# Patient Record
Sex: FEMALE | Race: BLACK OR AFRICAN AMERICAN | NOT HISPANIC OR LATINO | Employment: OTHER | URBAN - METROPOLITAN AREA
[De-identification: names, ages, dates, MRNs, and addresses within clinical notes are randomized per-mention and may not be internally consistent; named-entity substitution may affect disease eponyms.]

---

## 2017-01-23 ENCOUNTER — OFFICE VISIT (OUTPATIENT)
Dept: ORTHOPEDICS | Facility: CLINIC | Age: 82
End: 2017-01-23
Payer: MEDICARE

## 2017-01-23 VITALS
HEIGHT: 65 IN | DIASTOLIC BLOOD PRESSURE: 69 MMHG | SYSTOLIC BLOOD PRESSURE: 115 MMHG | WEIGHT: 162.38 LBS | HEART RATE: 88 BPM | BODY MASS INDEX: 27.05 KG/M2

## 2017-01-23 DIAGNOSIS — G89.29 CHRONIC MIDLINE LOW BACK PAIN WITH RIGHT-SIDED SCIATICA: ICD-10-CM

## 2017-01-23 DIAGNOSIS — M54.41 CHRONIC MIDLINE LOW BACK PAIN WITH RIGHT-SIDED SCIATICA: ICD-10-CM

## 2017-01-23 DIAGNOSIS — M79.671 RIGHT FOOT PAIN: Primary | ICD-10-CM

## 2017-01-23 PROCEDURE — 99213 OFFICE O/P EST LOW 20 MIN: CPT | Mod: 25,S$PBB,, | Performed by: ORTHOPAEDIC SURGERY

## 2017-01-23 PROCEDURE — 99213 OFFICE O/P EST LOW 20 MIN: CPT | Mod: PBBFAC | Performed by: ORTHOPAEDIC SURGERY

## 2017-01-23 PROCEDURE — 99999 PR PBB SHADOW E&M-EST. PATIENT-LVL III: CPT | Mod: PBBFAC,,, | Performed by: ORTHOPAEDIC SURGERY

## 2017-01-23 RX ORDER — HYDROCODONE BITARTRATE AND ACETAMINOPHEN 5; 325 MG/1; MG/1
TABLET ORAL
COMMUNITY
Start: 2016-12-30 | End: 2017-04-07 | Stop reason: SDUPTHER

## 2017-01-23 RX ORDER — CELECOXIB 200 MG/1
CAPSULE ORAL
COMMUNITY
Start: 2017-01-12 | End: 2017-02-02

## 2017-01-23 RX ORDER — PENICILLIN V POTASSIUM 500 MG/1
TABLET, FILM COATED ORAL
COMMUNITY
Start: 2016-12-01 | End: 2017-02-23

## 2017-01-23 RX ORDER — PREDNISONE 5 MG/1
10 TABLET ORAL DAILY
Refills: 1 | COMMUNITY
Start: 2017-01-02 | End: 2017-02-23 | Stop reason: SDUPTHER

## 2017-01-23 NOTE — MR AVS SNAPSHOT
[facility]  [address]  [place] [address]  Phone: [phone]                  Selma Lux   2017 11:00 AM   Office Visit    Description:  Female : 1935   Provider:  Prudencio Quinones MD   Department:  [facility]                To Do List           Future Appointments        Provider Department Dept Phone    2017 1:00 PM LAB, HEMONC CANCER BLDG [facility] [phone]    2017 2:00 PM MD [facility] [phone]    2017 1:20 PM Bhargav Lee MD [facility] [phone]    2017 10:40 AM Chu Qureshi MD [facility] Rheumatology [phone]    2017 11:15 AM Carlos Skelton MD [facility] [phone]      Goals (5 Years of Data)     None      [facility]     [facility] Nurse Care Line -  Assistance  Registered nurses in the [facility] provide clinical advisement, health education, appointment booking, and other advisory services.  Call for this free service at [phone].             Medications           Message regarding Medications     Verify the changes and/or additions to your medication regime listed below are the same as discussed with your clinician today.  If any of these changes or additions are incorrect, please notify your healthcare provider.             Verify that the below list of medications is an accurate representation of the medications you are currently taking.  If none reported, the list may be blank. If incorrect, please contact your healthcare provider. Carry this list with you in case of emergency.           Current Medications     amlodipine (NORVASC) 5 MG tablet Take 1 tablet (5 mg total) by mouth once daily.    celecoxib (CELEBREX) 200 MG capsule     esomeprazole (NEXIUM) 40 MG capsule Take 1 capsule (40 mg total) by mouth once daily. 1 Capsule, Delayed Release(E.C.) Oral Every day  "   hydrocodone-acetaminophen 5-325mg (NORCO) 5-325 mg per tablet     hydroxychloroquine (PLAQUENIL) 200 mg tablet TAKE 2 TABLETS ONCE DAILY    montelukast (SINGULAIR) 10 mg tablet Take 1 tablet (10 mg total) by mouth every evening.    predniSONE (DELTASONE) 5 MG tablet Take 5 mg by mouth once daily.    cyclobenzaprine (FLEXERIL) 10 MG tablet 10 mg as needed.     etodolac (LODINE) 400 MG tablet     ferrous sulfate 325 (65 FE) MG EC tablet Take 325 mg by mouth 2 (two) times daily.     hydrochlorothiazide (HYDRODIURIL) 12.5 MG Tab Take 1 tablet (12.5 mg total) by mouth once daily.    hydrocodone-acetaminophen 7.5-325mg (NORCO) 7.5-325 mg per tablet Take 1 tablet by mouth 2 (two) times daily as needed for Pain.    meloxicam (MOBIC) 15 MG tablet TAKE 1 TABLET DAILY    penicillin v potassium (VEETID) 500 MG tablet            Clinical Reference Information           Vital Signs - Last Recorded  Most recent update: 1/23/2017 11:11 AM by Madai Rouse MA    BP Pulse Ht Wt BMI    115/69 (BP Location: Right arm, Patient Position: Sitting, BP Method: Automatic) 88 5' 5" (1.651 m) 73.6 kg (162 lb 5.9 oz) 27.02 kg/m2      Blood Pressure          Most Recent Value    BP  115/69      Allergies as of 1/23/2017     No Known Allergies      Immunizations Administered on Date of Encounter - 1/23/2017     None      "

## 2017-01-23 NOTE — PROGRESS NOTES
CC:   left hip pain  HPI:  81 y.o. yo female who presents with left hip pain.    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     She returns for follow-up of the left hip pain.  She states the left hip has nearly totally resolved.  She did she's been doing very well.  She has not done physical therapy however.  She would likely the hip alone for now.    She does present with 2 separate problems today.  The first is left and right foot pain.  The right foot is hurting worse than left.  She has a history of rheumatologic disorder and is to see a rheumatologist in the near future.  She also would like to be evaluated for her foot pain.  She states the foot is been hurting for many months.  She states that is sharp and stabbing.  It keeps her from walking doing her activities of daily living.  She is not had any recent treatment for the foot pain.    She also complains of back pain.  She has some radiating numbness down the right leg like to have this evaluated as well.  She states that she's had no injury.  The pain is sharp and stabbing in the back and then a numbness down the leg.    PAST MEDICAL HISTORY:   Past Medical History   Diagnosis Date    Anemia     Arthritis     Hashimoto's disease     Hypertension     IGT (impaired glucose tolerance) 1/12/2016    Joint pain     Multinodular goiter 1/12/2016     PAST SURGICAL HISTORY:   Past Surgical History   Procedure Laterality Date    Salivary gland surgery      Tonsillectomy      Left parotidectomy       mixed tumor    Cataract extraction      Colonoscopy N/A 9/29/2015     Procedure: COLONOSCOPY;  Surgeon: FIDELINA Sanchez MD;  Location: [address];  Service: Endoscopy;  Laterality: N/A;    Joint replacement       right knee    Knee surgery Left 12/31/2013     TKR     FAMILY HISTORY:   Family History   Problem Relation Age of Onset    Hypertension Mother     Prostate cancer Father      prostate  cancer    Breast cancer Maternal Grandmother     Lupus Neg Hx     Psoriasis Neg Hx     Melanoma Neg Hx     Colon cancer Neg Hx      SOCIAL HISTORY:   Social History     Social History    Marital status:      Spouse name: N/A    Number of children: N/A    Years of education: N/A     Occupational History    Not on file.     Social History Main Topics    Smoking status: Never Smoker    Smokeless tobacco: Never Used    Alcohol use No    Drug use: No    Sexual activity: No      Comment: ,  age 52,      Other Topics Concern    Not on file     Social History Narrative       MEDICATIONS:   Current Outpatient Prescriptions:     amlodipine (NORVASC) 5 MG tablet, Take 1 tablet (5 mg total) by mouth once daily., Disp: 90 tablet, Rfl: 3    celecoxib (CELEBREX) 200 MG capsule, , Disp: , Rfl:     esomeprazole (NEXIUM) 40 MG capsule, Take 1 capsule (40 mg total) by mouth once daily. 1 Capsule, Delayed Release(E.C.) Oral Every day, Disp: 90 capsule, Rfl: 3    hydrocodone-acetaminophen 5-325mg (NORCO) 5-325 mg per tablet, , Disp: , Rfl:     hydroxychloroquine (PLAQUENIL) 200 mg tablet, TAKE 2 TABLETS ONCE DAILY, Disp: 180 tablet, Rfl: 1    montelukast (SINGULAIR) 10 mg tablet, Take 1 tablet (10 mg total) by mouth every evening., Disp: 90 tablet, Rfl: 3    predniSONE (DELTASONE) 5 MG tablet, Take 5 mg by mouth once daily., Disp: , Rfl: 1    cyclobenzaprine (FLEXERIL) 10 MG tablet, 10 mg as needed. , Disp: , Rfl:     etodolac (LODINE) 400 MG tablet, , Disp: , Rfl:     ferrous sulfate 325 (65 FE) MG EC tablet, Take 325 mg by mouth 2 (two) times daily. , Disp: , Rfl:     hydrochlorothiazide (HYDRODIURIL) 12.5 MG Tab, Take 1 tablet (12.5 mg total) by mouth once daily., Disp: 90 tablet, Rfl: 3    hydrocodone-acetaminophen 7.5-325mg (NORCO) 7.5-325 mg per tablet, Take 1 tablet by mouth 2 (two) times daily as needed for Pain., Disp: 15 tablet, Rfl: 0    meloxicam (MOBIC) 15 MG tablet, TAKE 1  "TABLET DAILY, Disp: 90 tablet, Rfl: 0    penicillin v potassium (VEETID) 500 MG tablet, , Disp: , Rfl:   ALLERGIES: Review of patient's allergies indicates:  No Known Allergies    VITAL SIGNS:   Visit Vitals    /69 (BP Location: Right arm, Patient Position: Sitting, BP Method: Automatic)    Pulse 88    Ht 5' 5" (1.651 m)    Wt 73.6 kg (162 lb 5.9 oz)    BMI 27.02 kg/m2        Review of Systems   Constitution: Negative. Negative for chills, fever and night sweats.   HENT: Negative for congestion and headaches.    Eyes: Negative for blurred vision, left vision loss and right vision loss.   Cardiovascular: Negative for chest pain and syncope.   Respiratory: Negative for cough and shortness of breath.    Endocrine: Negative for polydipsia, polyphagia and polyuria.   Hematologic/Lymphatic: Negative for bleeding problem. Does not bruise/bleed easily.   Skin: Negative for dry skin, itching and rash.   Musculoskeletal: Negative for falls and muscle weakness.  left hip pain  Gastrointestinal: Negative for abdominal pain and bowel incontinence.   Genitourinary: Negative for bladder incontinence and nocturia.   Neurological: Negative for disturbances in coordination, loss of balance and seizures.   Psychiatric/Behavioral: Negative for depression. The patient does not have insomnia.    Allergic/Immunologic: Negative for hives and persistent infections.       Physical Exam   Constitutional: Oriented to person, place, and time. Appears well-developed and well-nourished.   HENT:   Head: Normocephalic and atraumatic.   Nose: Nose normal.   Eyes: No scleral icterus.   Neck: Normal range of motion. Neck supple.   Cardiovascular: Normal rate and regular rhythm.    Pulses:       Radial pulses are 2+ on the right side, and 2+ on the left side.   Pulmonary/Chest: Clear and normal  Abdominal: Soft.   Neurological: Alert and oriented to person, place, and time.   Skin: Skin is warm.   Psychiatric: Normal mood and affect.     On " physical exam she is in no acute distress.  On her right lower extremity she has full range motion of the hip without pain.  Her right knee has full range of motion 0-110° with no instability.  She is neurovascular intact the right lower extremity.     Her left lower extremity she has extreme tenderness palpation at the greater trochanter.  She has normal full range of motion of the left hip without pain in the groin.  On examination of strength of her abductors her right side is 5 out of 5 and on the left side is 4+ out of 5.  On examination of her right knee she has full range of motion from 0-110° with no instability whatsoever.  She is no rest intact in the lateral lower extremities    Bilateral feet show mild tenderness palpation throughout.    She has a negative straight leg raise.      Xrays:  Of the bilateral hips with AP pelvis are  reviewed and interpretation as follows: mild to moderate osteoarthritis bilateral hips.  She has no osseous deformity of the greater trochanters or lesser trochanters.    X-rays of the spine are reviewed and demonstrate severe spondylosis of the lumbar spine.  She has degenerative scoliosis as well.    Assessment:  Encounter Diagnoses   Name Primary?    Right foot pain Yes    Chronic midline low back pain with right-sided sciatica        Plan:         Return if symptoms worsen or fail to improve.      I will leave her hips alone for now.  I will set her up with appointments with both Dr. lerma and Dr. Nita Slaughter.  She'll follow with me when necessary.

## 2017-01-26 ENCOUNTER — TELEPHONE (OUTPATIENT)
Dept: RHEUMATOLOGY | Facility: CLINIC | Age: 82
End: 2017-01-26

## 2017-01-26 NOTE — TELEPHONE ENCOUNTER
----- Message from Tad Zaidi sent at 1/26/2017  9:19 AM CST -----  Contact: self@ [phone]  Concerning her health, no other information given

## 2017-01-27 ENCOUNTER — TELEPHONE (OUTPATIENT)
Dept: RHEUMATOLOGY | Facility: CLINIC | Age: 82
End: 2017-01-27

## 2017-01-27 NOTE — TELEPHONE ENCOUNTER
She has been flaring for the past several months, especially her hands and feet.  Her anti-inflammatory medicines was changed to celecoxib.  She seems to be willing to go on another DMARD at this time.  She is scheduled to see me in 2 weeks.  I told her to increase her prednisone to 10 mg daily until that visit.

## 2017-01-27 NOTE — TELEPHONE ENCOUNTER
----- Message from Kalani Castillo MA sent at 1/26/2017  9:31 AM CST -----  Contact: self@ [phone]  Pt says she has been feeling bad, she would like a call back to discuss maybe increasing or changing her medications.  ----- Message -----     From: Tad Zaidi     Sent: 1/26/2017   9:19 AM       To: [facility] Staff    Concerning her health, no other information given

## 2017-02-02 ENCOUNTER — OFFICE VISIT (OUTPATIENT)
Dept: HEMATOLOGY/ONCOLOGY | Facility: CLINIC | Age: 82
End: 2017-02-02
Payer: MEDICARE

## 2017-02-02 ENCOUNTER — LAB VISIT (OUTPATIENT)
Dept: LAB | Facility: HOSPITAL | Age: 82
End: 2017-02-02
Attending: INTERNAL MEDICINE
Payer: MEDICARE

## 2017-02-02 VITALS
HEART RATE: 79 BPM | DIASTOLIC BLOOD PRESSURE: 67 MMHG | TEMPERATURE: 98 F | WEIGHT: 165.56 LBS | HEIGHT: 65 IN | SYSTOLIC BLOOD PRESSURE: 143 MMHG | BODY MASS INDEX: 27.58 KG/M2

## 2017-02-02 DIAGNOSIS — D50.0 IRON DEFICIENCY ANEMIA DUE TO CHRONIC BLOOD LOSS: Primary | ICD-10-CM

## 2017-02-02 DIAGNOSIS — D50.9 IRON DEFICIENCY ANEMIA: ICD-10-CM

## 2017-02-02 LAB
BASOPHILS # BLD AUTO: 0.01 K/UL
BASOPHILS NFR BLD: 0.1 %
DIFFERENTIAL METHOD: ABNORMAL
EOSINOPHIL # BLD AUTO: 0.2 K/UL
EOSINOPHIL NFR BLD: 1.8 %
ERYTHROCYTE [DISTWIDTH] IN BLOOD BY AUTOMATED COUNT: 14.8 %
FERRITIN SERPL-MCNC: 691 NG/ML
HCT VFR BLD AUTO: 33.9 %
HGB BLD-MCNC: 10.4 G/DL
LYMPHOCYTES # BLD AUTO: 1.3 K/UL
LYMPHOCYTES NFR BLD: 12.8 %
MCH RBC QN AUTO: 25.2 PG
MCHC RBC AUTO-ENTMCNC: 30.7 %
MCV RBC AUTO: 82 FL
MONOCYTES # BLD AUTO: 1.3 K/UL
MONOCYTES NFR BLD: 13.4 %
NEUTROPHILS # BLD AUTO: 7.1 K/UL
NEUTROPHILS NFR BLD: 71.8 %
PLATELET # BLD AUTO: 279 K/UL
PMV BLD AUTO: 9.3 FL
RBC # BLD AUTO: 4.13 M/UL
WBC # BLD AUTO: 9.87 K/UL

## 2017-02-02 PROCEDURE — 99214 OFFICE O/P EST MOD 30 MIN: CPT | Mod: S$PBB,,, | Performed by: INTERNAL MEDICINE

## 2017-02-02 PROCEDURE — 99999 PR PBB SHADOW E&M-EST. PATIENT-LVL III: CPT | Mod: PBBFAC,,, | Performed by: INTERNAL MEDICINE

## 2017-02-02 PROCEDURE — 36415 COLL VENOUS BLD VENIPUNCTURE: CPT

## 2017-02-02 PROCEDURE — 85025 COMPLETE CBC W/AUTO DIFF WBC: CPT

## 2017-02-02 PROCEDURE — 99213 OFFICE O/P EST LOW 20 MIN: CPT | Mod: PBBFAC | Performed by: INTERNAL MEDICINE

## 2017-02-02 PROCEDURE — 82728 ASSAY OF FERRITIN: CPT

## 2017-02-02 RX ORDER — MELOXICAM 15 MG/1
TABLET ORAL
Qty: 90 TABLET | Refills: 0 | Status: SHIPPED | OUTPATIENT
Start: 2017-02-02 | End: 2017-02-23

## 2017-02-02 NOTE — PROGRESS NOTES
Subjective:       Patient ID: Selma Lux is a 81 y.o. female.    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 Upper and lower endoscopy has been negative for GI blood loss. Urinalysis is negative for hematuria. There is no clinical blood loss.   Dietary heme iron is adequate. She takes interval iron supplements. She remains anemic and iron deficient.     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 CBC has a mild anemia, but ferritin remains normal.    HPI  Review of Systems   Constitutional: Negative.    HENT: Negative.    Eyes: Negative.    Respiratory: Negative.    Cardiovascular: Positive for leg swelling.   Gastrointestinal: Negative for blood in stool.   Endocrine: Negative.    Genitourinary: Negative for hematuria.   Musculoskeletal: Positive for arthralgias.   Skin: Negative.    Allergic/Immunologic: Negative for environmental allergies, food allergies and immunocompromised state.   Neurological: Negative.    Hematological: Negative for adenopathy. Does not bruise/bleed easily.   Psychiatric/Behavioral: Negative.        Objective:      Physical Exam   Constitutional: She is oriented to person, place, and time. She appears well-developed and well-nourished.   HENT:   Head: Normocephalic and atraumatic.   Eyes: Conjunctivae are normal.   Neck: Normal range of motion. Neck supple.   Cardiovascular: Normal rate and intact distal pulses.    Pulmonary/Chest: Effort normal. No respiratory distress.   Abdominal: She exhibits no distension. There is no tenderness.   Neurological: She is alert and oriented to person, place, and time.   Skin: Skin is warm and dry.   Psychiatric: She has a normal mood and affect. Her behavior is normal. Judgment and thought content normal.   Nursing note and vitals reviewed.      Assessment:       1. Iron deficiency anemia due to chronic blood loss        Plan:       Repeat  CBC and ferritin in 6 months  Continue oral iron  Continue dietary heme iron

## 2017-02-14 ENCOUNTER — OFFICE VISIT (OUTPATIENT)
Dept: INTERNAL MEDICINE | Facility: CLINIC | Age: 82
End: 2017-02-14
Payer: MEDICARE

## 2017-02-14 VITALS
WEIGHT: 160.5 LBS | SYSTOLIC BLOOD PRESSURE: 118 MMHG | OXYGEN SATURATION: 96 % | HEART RATE: 83 BPM | HEIGHT: 65 IN | DIASTOLIC BLOOD PRESSURE: 66 MMHG | BODY MASS INDEX: 26.74 KG/M2

## 2017-02-14 DIAGNOSIS — E06.3 HASHIMOTO'S DISEASE: ICD-10-CM

## 2017-02-14 DIAGNOSIS — R20.0 NUMBNESS: Primary | ICD-10-CM

## 2017-02-14 DIAGNOSIS — M16.0 OSTEOARTHRITIS OF BOTH HIPS, UNSPECIFIED OSTEOARTHRITIS TYPE: ICD-10-CM

## 2017-02-14 DIAGNOSIS — M15.9 PRIMARY OSTEOARTHRITIS INVOLVING MULTIPLE JOINTS: ICD-10-CM

## 2017-02-14 DIAGNOSIS — I10 HYPERTENSION, ESSENTIAL: ICD-10-CM

## 2017-02-14 DIAGNOSIS — M05.79 SEROPOSITIVE RHEUMATOID ARTHRITIS OF MULTIPLE SITES: ICD-10-CM

## 2017-02-14 DIAGNOSIS — E78.5 HYPERLIPIDEMIA, UNSPECIFIED HYPERLIPIDEMIA TYPE: ICD-10-CM

## 2017-02-14 DIAGNOSIS — M75.120 COMPLETE TEAR OF ROTATOR CUFF, UNSPECIFIED LATERALITY: ICD-10-CM

## 2017-02-14 PROCEDURE — 99214 OFFICE O/P EST MOD 30 MIN: CPT | Mod: S$PBB,,, | Performed by: FAMILY MEDICINE

## 2017-02-14 PROCEDURE — 99213 OFFICE O/P EST LOW 20 MIN: CPT | Mod: PBBFAC | Performed by: FAMILY MEDICINE

## 2017-02-14 PROCEDURE — 99999 PR PBB SHADOW E&M-EST. PATIENT-LVL III: CPT | Mod: PBBFAC,,, | Performed by: FAMILY MEDICINE

## 2017-02-14 RX ORDER — CELECOXIB 200 MG/1
200 CAPSULE ORAL 2 TIMES DAILY
COMMUNITY
End: 2017-03-14 | Stop reason: SDUPTHER

## 2017-02-14 NOTE — MR AVS SNAPSHOT
[facility]  [address]  [facility] [address]  Phone: [phone]  Fax: [phone]                  Selma Lux   2017 1:20 PM   Office Visit    Description:  Female : 1935   Provider:  Bhargav Lee MD   Department:  [facility] - [facility] Medicine           Reason for Visit     Follow-up           Diagnoses this Visit        Comments    Numbness    -  Primary     Primary osteoarthritis involving multiple joints         Hypertension, essential         Osteoarthritis of both hips, unspecified osteoarthritis type         Complete tear of rotator cuff, unspecified laterality         Seropositive rheumatoid arthritis of multiple sites         Hyperlipidemia, unspecified hyperlipidemia type         Hashimoto's disease                To Do List           Future Appointments        Provider Department Dept Phone    2017 10:40 AM Chu Qureshi MD [facility] [phone]    2017 11:15 AM Carlos Skelton MD [facility] [phone]    2017 1:15 PM Carlos Skelton MD [facility] [phone]    2017 1:00 PM Raciel Landa MD [facility] [phone]      Goals (5 Years of Data)     None      Follow-Up and Disposition     Return in about 3 months (around 2017) for lab review (after future labs), Keep appointments with specialty providers..    Follow-up and Disposition History      [facility]     [facility] Care [facility] -  Assistance  Registered nurses in the [facility] provide clinical advisement, health education, appointment booking, and other advisory services.  Call for this free service at [phone].             Medications           Message regarding Medications     Verify the changes and/or additions to your medication regime listed below are the same as discussed with your clinician today.  If any of these changes or additions are  "incorrect, please notify your healthcare provider.             Verify that the below list of medications is an accurate representation of the medications you are currently taking.  If none reported, the list may be blank. If incorrect, please contact your healthcare provider. Carry this list with you in case of emergency.           Current Medications     amlodipine (NORVASC) 5 MG tablet Take 1 tablet (5 mg total) by mouth once daily.    celecoxib (CELEBREX) 200 MG capsule Take 200 mg by mouth 2 (two) times daily.    esomeprazole (NEXIUM) 40 MG capsule Take 1 capsule (40 mg total) by mouth once daily. 1 Capsule, Delayed Release(E.C.) Oral Every day    ferrous sulfate 325 (65 FE) MG EC tablet Take 325 mg by mouth 2 (two) times daily.     hydrocodone-acetaminophen 7.5-325mg (NORCO) 7.5-325 mg per tablet Take 1 tablet by mouth 2 (two) times daily as needed for Pain.    hydroxychloroquine (PLAQUENIL) 200 mg tablet TAKE 2 TABLETS ONCE DAILY    montelukast (SINGULAIR) 10 mg tablet Take 1 tablet (10 mg total) by mouth every evening.    predniSONE (DELTASONE) 5 MG tablet Take 10 mg by mouth once daily.     cyclobenzaprine (FLEXERIL) 10 MG tablet 10 mg as needed.     hydrochlorothiazide (HYDRODIURIL) 12.5 MG Tab Take 1 tablet (12.5 mg total) by mouth once daily.    hydrocodone-acetaminophen 5-325mg (NORCO) 5-325 mg per tablet     meloxicam (MOBIC) 15 MG tablet TAKE 1 TABLET DAILY    penicillin v potassium (VEETID) 500 MG tablet            Clinical Reference Information           Your Vitals Were     BP Pulse Height Weight SpO2 BMI    118/66 (BP Location: Left arm, Patient Position: Sitting, BP Method: Manual) 83 5' 5" (1.651 m) 72.8 kg (160 lb 7.9 oz) 96% 26.71 kg/m2      Blood Pressure          Most Recent Value    BP  118/66      Allergies as of 2/14/2017     No Known Allergies      Immunizations Administered on Date of Encounter - 2/14/2017     None      Orders Placed During Today's Visit     Future Labs/Procedures " Expected by Expires    Comprehensive metabolic panel  5/15/2017 (Approximate) 8/13/2017    Lipid panel  5/15/2017 (Approximate) 8/13/2017    TSH  5/15/2017 (Approximate) 8/13/2017    EMG w/ Ultrasound and Nerve Conduction  As directed 2/14/2018      Language Assistance Services     ATTENTION: Language assistance services are available, free of charge. Please call [phone].      ATENCIÓN: Si habla español, tiene a lucio disposición servicios gratuitos de asistencia lingüística. Llame al [phone].     CHÚ Ý: N?u b?n nói Ti?ng Vi?t, có các d?ch v? h? tr? ngôn ng? mi?n phí dành cho b?n. G?i s? [phone].         [facility] complies with applicable Federal civil rights laws and does not discriminate on the basis of race, color, national origin, age, disability, or sex.

## 2017-02-14 NOTE — PROGRESS NOTES
Subjective:       Patient ID: Selma Lux is a 81 y.o. female.    Chief Complaint: Follow-up    HPI  Review of Systems   Constitutional: Positive for fatigue. Negative for chills and fever.   HENT: Negative for congestion and trouble swallowing.    Eyes: Negative for redness.   Respiratory: Negative for cough, chest tightness and shortness of breath.    Cardiovascular: Negative for chest pain, palpitations and leg swelling.   Gastrointestinal: Negative for abdominal pain and blood in stool.   Genitourinary: Negative for hematuria and menstrual problem.   Musculoskeletal: Positive for arthralgias, back pain, gait problem and neck pain. Negative for joint swelling and myalgias.   Skin: Negative for color change and rash.   Neurological: Negative for tremors, speech difficulty, weakness, numbness and headaches.   Hematological: Negative for adenopathy. Does not bruise/bleed easily.   Psychiatric/Behavioral: Negative for behavioral problems, confusion and sleep disturbance. The patient is not nervous/anxious.        Objective:      Physical Exam   Constitutional: She is oriented to person, place, and time. She appears well-developed and well-nourished. No distress.   Neck: Neck supple.   Pulmonary/Chest: Effort normal.   Abdominal: There is no tenderness. There is no rebound and no CVA tenderness.   Musculoskeletal: She exhibits no edema.        Right shoulder: She exhibits decreased range of motion and tenderness.        Left shoulder: She exhibits decreased range of motion and tenderness.        Right hip: She exhibits normal range of motion and normal strength.        Left hip: She exhibits normal range of motion and normal strength.        Thoracic back: She exhibits normal range of motion and no tenderness.        Lumbar back: She exhibits normal range of motion, no tenderness and no spasm.        Right lower leg: She exhibits no edema.        Left lower leg: She exhibits no edema.   Neurological: She is  alert and oriented to person, place, and time. She has normal strength. No sensory deficit. She displays a negative Romberg sign. Coordination and gait normal.   Reflex Scores:       Patellar reflexes are 2+ on the right side and 2+ on the left side.       Achilles reflexes are 2+ on the right side and 2+ on the left side.  Negative SLR.   Skin: Skin is warm and dry. No rash noted.   Psychiatric: She has a normal mood and affect. Her behavior is normal. Judgment and thought content normal.   Nursing note and vitals reviewed.      Assessment:       1. Numbness    2. Primary osteoarthritis involving multiple joints    3. Hypertension, essential    4. Osteoarthritis of both hips, unspecified osteoarthritis type    5. Complete tear of rotator cuff, unspecified laterality    6. Seropositive rheumatoid arthritis of multiple sites    7. Hyperlipidemia, unspecified hyperlipidemia type    8. Hashimoto's disease        Plan:   Selma was seen today for follow-up.    Diagnoses and all orders for this visit:    Numbness  -     EMG w/ Ultrasound and Nerve Conduction; Future    Primary osteoarthritis involving multiple joints    Hypertension, essential  -     Comprehensive metabolic panel; Future    Osteoarthritis of both hips, unspecified osteoarthritis type    Complete tear of rotator cuff, unspecified laterality    Seropositive rheumatoid arthritis of multiple sites    Hyperlipidemia, unspecified hyperlipidemia type  -     Lipid panel; Future    Hashimoto's disease  -     TSH; Future      See meds, orders, follow up, routing and instructions sections of encounter.  A 81-year-old follow up pain in the feet, difficulty with ambulation, some   fatigue, malaise.    We went over her problem list and medications.  I thinks some of her issues are   with her profound arthritis, especially with loss of range of motion in her   shoulders.  She does have some dysesthesias to the lower extremities, suggest   checking EMG nerve conduction  study.  She has had an MRI in the past.      SELENA/HN  dd: 02/14/2017 15:38:06 (CST)  td: 02/15/2017 02:08:37 (CST)  Doc ID   #[id number]  Job ID #[id number]    CC:

## 2017-02-20 ENCOUNTER — OFFICE VISIT (OUTPATIENT)
Dept: ORTHOPEDICS | Facility: CLINIC | Age: 82
End: 2017-02-20
Payer: MEDICARE

## 2017-02-20 ENCOUNTER — HOSPITAL ENCOUNTER (OUTPATIENT)
Dept: RADIOLOGY | Facility: HOSPITAL | Age: 82
Discharge: HOME | End: 2017-02-20
Attending: ORTHOPAEDIC SURGERY
Payer: MEDICARE

## 2017-02-20 VITALS
SYSTOLIC BLOOD PRESSURE: 131 MMHG | WEIGHT: 162.69 LBS | HEART RATE: 76 BPM | BODY MASS INDEX: 27.1 KG/M2 | DIASTOLIC BLOOD PRESSURE: 74 MMHG | HEIGHT: 65 IN

## 2017-02-20 DIAGNOSIS — M20.11 HALLUX VALGUS, RIGHT: ICD-10-CM

## 2017-02-20 DIAGNOSIS — R52 PAIN: ICD-10-CM

## 2017-02-20 DIAGNOSIS — R52 PAIN: Primary | ICD-10-CM

## 2017-02-20 PROCEDURE — 99999 PR PBB SHADOW E&M-EST. PATIENT-LVL III: CPT | Mod: PBBFAC,,, | Performed by: ORTHOPAEDIC SURGERY

## 2017-02-20 PROCEDURE — 73630 X-RAY EXAM OF FOOT: CPT | Mod: 26,RT,, | Performed by: RADIOLOGY

## 2017-02-20 PROCEDURE — 99213 OFFICE O/P EST LOW 20 MIN: CPT | Mod: S$PBB,,, | Performed by: ORTHOPAEDIC SURGERY

## 2017-02-20 PROCEDURE — 73630 X-RAY EXAM OF FOOT: CPT | Mod: TC,RT

## 2017-02-20 NOTE — PROGRESS NOTES
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 She has PMH of RA.  She has had flat feet her entire life with right > left, as well has bunions.  Both feet do cause pain, however recently her right foot has been much worse.  She has noticed increased pain and progression of her right bunion to the point that all shoe wear causes pain.  She has pain daily, but does feel that it has improved slightly.  She also endorses some mid-foot pain with constrictive shoes.  It has got to the point that she cannot wear slippers without pain.  She has not tried sandals.  She sued to wear high-heeled shoes daily for many years.  She has also noticed that she has begun walking on the medial border of her foot, possibly due to malalignment of her knees (she is s/p BL TKAs).      PAST MEDICAL/SURGICAL HISTORY:  Past Medical History   Diagnosis Date    Anemia     Arthritis     Hashimoto's disease     Hypertension     IGT (impaired glucose tolerance) 1/12/2016    Joint pain     Multinodular goiter 1/12/2016     Past Surgical History   Procedure Laterality Date    Salivary gland surgery      Tonsillectomy      Left parotidectomy       mixed tumor    Cataract extraction      Colonoscopy N/A 9/29/2015     Procedure: COLONOSCOPY;  Surgeon: FIDELINA Sanchez MD;  Location: [address];  Service: Endoscopy;  Laterality: N/A;    Joint replacement       right knee    Knee surgery Left 12/31/2013     TKR       Current Medications:   Current Outpatient Prescriptions:     amlodipine (NORVASC) 5 MG tablet, Take 1 tablet (5 mg total) by mouth once daily., Disp: 90 tablet, Rfl: 3    celecoxib (CELEBREX) 200 MG capsule, Take 200 mg by mouth 2 (two) times daily., Disp: , Rfl:     esomeprazole (NEXIUM) 40 MG capsule, Take 1 capsule (40 mg total) by mouth once daily. 1 Capsule,  Delayed Release(E.C.) Oral Every day, Disp: 90 capsule, Rfl: 3    hydrocodone-acetaminophen 5-325mg (NORCO) 5-325 mg per tablet, , Disp: , Rfl:     hydroxychloroquine (PLAQUENIL) 200 mg tablet, TAKE 2 TABLETS ONCE DAILY, Disp: 180 tablet, Rfl: 1    montelukast (SINGULAIR) 10 mg tablet, Take 1 tablet (10 mg total) by mouth every evening., Disp: 90 tablet, Rfl: 3    predniSONE (DELTASONE) 5 MG tablet, Take 10 mg by mouth once daily. , Disp: , Rfl: 1    cyclobenzaprine (FLEXERIL) 10 MG tablet, 10 mg as needed. , Disp: , Rfl:     ferrous sulfate 325 (65 FE) MG EC tablet, Take 325 mg by mouth 2 (two) times daily. , Disp: , Rfl:     hydrochlorothiazide (HYDRODIURIL) 12.5 MG Tab, Take 1 tablet (12.5 mg total) by mouth once daily., Disp: 90 tablet, Rfl: 3    hydrocodone-acetaminophen 7.5-325mg (NORCO) 7.5-325 mg per tablet, Take 1 tablet by mouth 2 (two) times daily as needed for Pain., Disp: 15 tablet, Rfl: 0    meloxicam (MOBIC) 15 MG tablet, TAKE 1 TABLET DAILY, Disp: 90 tablet, Rfl: 0    penicillin v potassium (VEETID) 500 MG tablet, , Disp: , Rfl:     Social History:   Social History     Social History    Marital status:      Spouse name: N/A    Number of children: N/A    Years of education: N/A     Occupational History    Not on file.     Social History Main Topics    Smoking status: Never Smoker    Smokeless tobacco: Never Used    Alcohol use No    Drug use: No    Sexual activity: No      Comment: ,  age 52,      Other Topics Concern    Not on file     Social History Narrative       REVIEW OF SYSTEMS:  Constitution: Negative. Negative for chills, fever and night sweats.   Cardiovascular: Negative for chest pain and syncope.   Respiratory: Negative for cough and shortness of breath.   Gastrointestinal: See HPI. Negative for nausea/vomiting. Negative for abdominal pain.  Genitourinary: See HPI. Negative for discoloration or dysuria.  Skin: Negative for dry skin, itching and rash.  "  Hematologic/Lymphatic: Negative for bleeding problem. Does not bruise/bleed easily.   Musculoskeletal: Negative for falls and muscle weakness.   Neurological: See HPI. No seizures.   Endocrine: Negative for polydipsia, polyphagia and polyuria.   Allergic/Immunologic: Negative for hives and persistent infections.    PHYSICAL EXAMINATION:    Visit Vitals    /74 (BP Location: Right arm, Patient Position: Sitting, BP Method: Automatic)    Pulse 76    Ht 5' 5" (1.651 m)    Wt 73.8 kg (162 lb 11.2 oz)    BMI 27.07 kg/m2       General: The patient is a 81 y.o. female in no apparent distress, the patient is orientatied to person, place and time.   Psych: Normal mood and affect  HEENT:  NCAT, sclera nonicteric  Lungs:  Respirations are equal and unlabored.  CV:  2+ bilateral upper and lower extremity pulses.  Skin:  Intact throughout.  Musculoskeletal: No pain with the range of motion of the bilateral hips. No trochanteric tenderness to palpation. No pain with range of motion about the bilateral knees.    Right foot:  - mild bunion  - pes planus with pronation  - mild ttp to bunion  - no ttp to PTT  - unable to perform single leg heel raise  - SILT  - 2+ PT/DP pulse        IMAGING:     Radiographs of the right foot were ordered and personally reviewed with the patient today.   - pes planus   - severe midfoot arthritis   - mild hallux valgus with TIMMY 13, HVA 25    ASSESSMENT/PLAN:    Selma was seen today for foot pain.    Diagnoses and all orders for this visit:    Pain  -     X-Ray Foot Complete Right; Future    Hallux valgus, right      No Follow-up on file.    Mild hallux valgus.  Discussed operative and non operative treatment with her. Given the degree of pain and length of symptoms, it was recommended to proceed with a Chevron osteotomy.  Explained that no guarantee of being pain-free could be made. She will think about this procedure and call to schedule, if she desires.  She will continue to wear her " inserts in the mean time for her pes planus      I have personally taken the history and examined this patient and agree with the residents note as stated above.

## 2017-02-20 NOTE — LETTER
February 20, 2017      Prudencio Quinones MD  [address] [facility] [address]           [facility]  [address]  [place] [address]  Phone: [phone]          Patient: Selma Alonzo Lux MD   MR Number: [id number]   YOB: 1935   Date of Visit: 2/20/2017       Dear Dr. Prudencio Quinones:    Thank you for referring Selma uLx to me for evaluation. Attached you will find relevant portions of my assessment and plan of care.    If you have questions, please do not hesitate to call me. I look forward to following Selma Lux along with you.    Sincerely,    Carlos Skelton MD    Enclosure  CC:  No Recipients    If you would like to receive this communication electronically, please contact [email or web address] or [phone] to request more information on [employer] access.    For providers and/or their staff who would like to refer a patient to [facility], please contact us through our one-stop-shop provider referral line, [facility], at [phone].    If you feel you have received this communication in error or would no longer like to receive these types of communications, please e-mail [email or web address]

## 2017-02-21 ENCOUNTER — OFFICE VISIT (OUTPATIENT)
Dept: ORTHOPEDICS | Facility: CLINIC | Age: 82
End: 2017-02-21
Payer: MEDICARE

## 2017-02-21 VITALS
DIASTOLIC BLOOD PRESSURE: 73 MMHG | HEIGHT: 65 IN | SYSTOLIC BLOOD PRESSURE: 132 MMHG | BODY MASS INDEX: 26.99 KG/M2 | HEART RATE: 79 BPM | WEIGHT: 162 LBS

## 2017-02-21 DIAGNOSIS — G95.9 CERVICAL MYELOPATHY: Primary | ICD-10-CM

## 2017-02-21 DIAGNOSIS — M54.16 LUMBAR RADICULOPATHY: ICD-10-CM

## 2017-02-21 PROCEDURE — 99214 OFFICE O/P EST MOD 30 MIN: CPT | Mod: S$PBB,,, | Performed by: ORTHOPAEDIC SURGERY

## 2017-02-21 PROCEDURE — 99999 PR PBB SHADOW E&M-EST. PATIENT-LVL III: CPT | Mod: PBBFAC,,, | Performed by: ORTHOPAEDIC SURGERY

## 2017-02-21 PROCEDURE — 99213 OFFICE O/P EST LOW 20 MIN: CPT | Mod: PBBFAC | Performed by: ORTHOPAEDIC SURGERY

## 2017-02-21 NOTE — PROGRESS NOTES
DATE: 2/21/2017  PATIENT: Selma Lux MD    Attending Physician: Raciel Landa M.D.    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 The pain has been present for >1 year without inciting event. The patient describes the pain as aching pain with shooting numbness to right lateral leg and lateral foot.  The pain is worse upon waking, prolonged imobilizition and improved by walking and moving. There is + associated numbness and tingling. There is + subjective weakness. Prior treatments have included mobic, celebrex, but no injections or spine PT or surgery.  Patient reports some balance problems with walking.    Patient has multiple arthritic joints including bilateral rotator cuff arthropathy (declined rTSA), hip pain, bilateral foot pain.    The Patient has myelopathic symptoms such as handwriting changes. Denies perineal paresthesias.  Has had recent stress urinary incontinence    PAST MEDICAL/SURGICAL HISTORY:  Past Medical History   Diagnosis Date    Anemia     Arthritis     Hashimoto's disease     Hypertension     IGT (impaired glucose tolerance) 1/12/2016    Joint pain     Multinodular goiter 1/12/2016     Past Surgical History   Procedure Laterality Date    Salivary gland surgery      Tonsillectomy      Left parotidectomy       mixed tumor    Cataract extraction      Colonoscopy N/A 9/29/2015     Procedure: COLONOSCOPY;  Surgeon: FIDELINA Sanchez MD;  Location: [address]);  Service: Endoscopy;  Laterality: N/A;    Joint replacement       right knee    Knee surgery Left 12/31/2013     TKR       Current Medications:   Current Outpatient Prescriptions:     amlodipine (NORVASC) 5 MG tablet, Take 1 tablet (5 mg total) by mouth once daily., Disp: 90 tablet, Rfl: 3    celecoxib  (CELEBREX) 200 MG capsule, Take 200 mg by mouth 2 (two) times daily., Disp: , Rfl:     cyclobenzaprine (FLEXERIL) 10 MG tablet, 10 mg as needed. , Disp: , Rfl:     esomeprazole (NEXIUM) 40 MG capsule, Take 1 capsule (40 mg total) by mouth once daily. 1 Capsule, Delayed Release(E.C.) Oral Every day, Disp: 90 capsule, Rfl: 3    ferrous sulfate 325 (65 FE) MG EC tablet, Take 325 mg by mouth 2 (two) times daily. , Disp: , Rfl:     hydrochlorothiazide (HYDRODIURIL) 12.5 MG Tab, Take 1 tablet (12.5 mg total) by mouth once daily., Disp: 90 tablet, Rfl: 3    hydrocodone-acetaminophen 5-325mg (NORCO) 5-325 mg per tablet, , Disp: , Rfl:     hydrocodone-acetaminophen 7.5-325mg (NORCO) 7.5-325 mg per tablet, Take 1 tablet by mouth 2 (two) times daily as needed for Pain., Disp: 15 tablet, Rfl: 0    hydroxychloroquine (PLAQUENIL) 200 mg tablet, TAKE 2 TABLETS ONCE DAILY, Disp: 180 tablet, Rfl: 1    meloxicam (MOBIC) 15 MG tablet, TAKE 1 TABLET DAILY, Disp: 90 tablet, Rfl: 0    montelukast (SINGULAIR) 10 mg tablet, Take 1 tablet (10 mg total) by mouth every evening., Disp: 90 tablet, Rfl: 3    penicillin v potassium (VEETID) 500 MG tablet, , Disp: , Rfl:     predniSONE (DELTASONE) 5 MG tablet, Take 10 mg by mouth once daily. , Disp: , Rfl: 1    Social History:   Social History     Social History    Marital status:      Spouse name: N/A    Number of children: N/A    Years of education: N/A     Occupational History    Not on file.     Social History Main Topics    Smoking status: Never Smoker    Smokeless tobacco: Never Used    Alcohol use No    Drug use: No    Sexual activity: No      Comment: ,  age 52,      Other Topics Concern    Not on file     Social History Narrative       REVIEW OF SYSTEMS:  Constitution: Negative. Negative for chills, fever and night sweats.   Cardiovascular: Negative for chest pain and syncope.   Respiratory: Negative for cough and shortness of breath.  "  Gastrointestinal: See HPI. Negative for nausea/vomiting. Negative for abdominal pain.  Genitourinary: See HPI. Negative for discoloration or dysuria.  Hematologic/Lymphatic: neg for bleeding/clotting disorders.   Musculoskeletal: Negative for falls and muscle weakness.   Neurological: See HPI. neg history of seizures. neg history of cranial surgery or shunts.  Neurological: See HPI. No seizures.   Endocrine: Negative for polydipsia, polyphagia and polyuria.   Allergic/Immunologic: Negative for hives and persistent infections.     EXAM:  Visit Vitals    /73    Pulse 79    Ht 5' 5" (1.651 m)    Wt 73.5 kg (162 lb)    BMI 26.96 kg/m2       PHYSICAL EXAMINATION:    General: The patient is a very pleasant 81 y.o. female in no apparent distress, the patient is orientatied to person, place and time.  Psych: Normal mood and affect  HEENT: Vision grossly intact, hearing intact to the spoken word.  Lungs: Respirations unlabored.  Gait: unsteady gait, very narrow-based gait.  Skin: Dorsal lumbar skin negative for rashes, lesions, hairy patches and surgical scars. There is + lumbar tenderness to palpation.  Range of motion: Lumbar range of motion is acceptable.  Spinal Balance: Global saggital and coronal spinal balance acceptable, no significant for scoliosis and kyphosis.  Musculoskeletal: No pain with the range of motion of the bilateral hips. No trochanteric tenderness to palpation.  Vascular: Bilateral lower extremities warm and well perfused, Dorsalis pedis pulses 2+ bilaterally.  Neurological: Normal strength and tone in all major motor groups in the bilateral lower extremities. Normal sensation to light touch in the L2-S1 dermatomes bilaterally.  Deep tendon reflexes symmetric 2+ in the bilateral lower extremities.  Negative Babinski bilaterally. Straight leg raise negative bilaterally.  RUE:  +Davis's.  +inverse radial reflex    IMAGING:      Today I personally reviewed AP, Lat and Flex/Ex  upright " L-spine that demonstrate diffuse spondylosis with multi level disc degeneration.  Degenerative scoliosis.  Xray c spine shows diffuse spondylosis with Grade 1 C2-3 anterolisthesis     ASSESSMENT/PLAN:    80 y/o female with Rheumatoid Arthritis: lumbar radiculopathy and signs/symptoms of cervical myelopathy    MRI cervical and lumbar spine.  F/u with results    Diagnoses and all orders for this visit:    Cervical myelopathy  -     MRI Cervical Spine Without Contrast; Future    Lumbar radiculopathy  -     MRI Lumbar Spine Without Contrast; Future

## 2017-02-22 ENCOUNTER — OFFICE VISIT (OUTPATIENT)
Dept: SURGERY | Facility: CLINIC | Age: 82
End: 2017-02-22
Payer: MEDICARE

## 2017-02-22 DIAGNOSIS — K62.5 RECTAL BLEEDING: ICD-10-CM

## 2017-02-22 PROCEDURE — 99999 PR PBB SHADOW E&M-EST. PATIENT-LVL II: CPT | Mod: PBBFAC,,, | Performed by: COLON & RECTAL SURGERY

## 2017-02-22 PROCEDURE — 99212 OFFICE O/P EST SF 10 MIN: CPT | Mod: PBBFAC | Performed by: COLON & RECTAL SURGERY

## 2017-02-22 PROCEDURE — 99213 OFFICE O/P EST LOW 20 MIN: CPT | Mod: S$PBB,,, | Performed by: COLON & RECTAL SURGERY

## 2017-02-22 NOTE — PROGRESS NOTES
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 c-scope 9/25/15 for hx of polyps had small polyp (Adenoma) by Daniel    ROS:        Constitutional: No fever, chills, activity or appetite change.      HENT: No hearing loss, facial swelling, neck pain or stiffness.       Eyes: No discharge, itching and visual disturbance.      Respiratory: No apnea, cough, choking or shortness of breath.       Cardiovascular: No leg swelling or chest pain      Gastrointestinal: No abdominal distention or change in bowel habbits     Genitourinary: No dysuria, frequency or flank pain.      Musculoskeletal: No arthralgias or gait problem.      Neurological: No dizziness, seizures or weakness.      Hematological: No adenopathy.      Psychiatric/Behavioral: No hallucinations or behavioral problems.       PE:    APPEARANCE: Well nourished, well developed, in no acute distress.   CHEST: Lungs clear. Normal respiratory effort.  CARDIOVASCULAR: Normal rhythm. No edema.  ABDOMEN: Soft. No tenderness or masses.  Rectum:  Normal skin,  Rectal defererred per patient    Musculoskeletal: Symmetric, normal range of motion and strength.   Neurological: Alert and oriented to person, place, and time. Normal reflexes.   Skin: Skin is warm and dry.   Psychiatric: Normal mood and affect. Behavior is normal and appropriate.     Impression: Internal hemorrhoids  PLAN: daily Miralax and fiber. If bleeding persists patient grant RTC for possible RBL or IRC.

## 2017-02-23 ENCOUNTER — OFFICE VISIT (OUTPATIENT)
Dept: RHEUMATOLOGY | Facility: CLINIC | Age: 82
End: 2017-02-23
Payer: MEDICARE

## 2017-02-23 VITALS
HEART RATE: 76 BPM | HEIGHT: 65 IN | DIASTOLIC BLOOD PRESSURE: 68 MMHG | SYSTOLIC BLOOD PRESSURE: 130 MMHG | WEIGHT: 160.63 LBS | BODY MASS INDEX: 26.76 KG/M2

## 2017-02-23 DIAGNOSIS — Z79.52 LONG TERM (CURRENT) USE OF SYSTEMIC STEROIDS: ICD-10-CM

## 2017-02-23 DIAGNOSIS — M05.79 SEROPOSITIVE RHEUMATOID ARTHRITIS OF MULTIPLE SITES: Primary | ICD-10-CM

## 2017-02-23 DIAGNOSIS — M15.9 PRIMARY OSTEOARTHRITIS INVOLVING MULTIPLE JOINTS: ICD-10-CM

## 2017-02-23 PROCEDURE — 99214 OFFICE O/P EST MOD 30 MIN: CPT | Mod: S$PBB,,, | Performed by: INTERNAL MEDICINE

## 2017-02-23 PROCEDURE — 99213 OFFICE O/P EST LOW 20 MIN: CPT | Mod: PBBFAC | Performed by: INTERNAL MEDICINE

## 2017-02-23 PROCEDURE — 99999 PR PBB SHADOW E&M-EST. PATIENT-LVL III: CPT | Mod: PBBFAC,,, | Performed by: INTERNAL MEDICINE

## 2017-02-23 RX ORDER — PREDNISONE 5 MG/1
10 TABLET ORAL DAILY
Qty: 180 TABLET | Refills: 1 | Status: SHIPPED | OUTPATIENT
Start: 2017-02-23 | End: 2017-08-22 | Stop reason: SDUPTHER

## 2017-02-23 ASSESSMENT — ROUTINE ASSESSMENT OF PATIENT INDEX DATA (RAPID3)
PSYCHOLOGICAL DISTRESS SCORE: 0
AM STIFFNESS SCORE: 1, YES
AM STIFFNESS SCORE: 1, YES
MDHAQ FUNCTION SCORE: .3
PATIENT GLOBAL ASSESSMENT SCORE: 0
PAIN SCORE: 5.5
PAIN SCORE: 0
MDHAQ FUNCTION SCORE: 0
PSYCHOLOGICAL DISTRESS SCORE: 0
FATIGUE SCORE: 0
FATIGUE SCORE: 0
TOTAL RAPID3 SCORE: 1.83
WHEN YOU AWAKENED IN THE MORNING OVER THE LAST WEEK, PLEASE INDICATE THE AMOUNT OF TIME IT TAKES UNTIL YOU ARE AS LIMBER AS YOU WILL BE FOR THE DAY: 1-2 HOURS

## 2017-02-23 NOTE — PROGRESS NOTES
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  She remains on Plaquenil 400 mg daily.  She has refused to try other DMARD's.  She has also been on long-term prednisone therapy, usually 5 mg.  It is sometimes hard to tell when her problem is more due to rheumatoid arthritis or osteoarthritis.  She does have persistent elevation of her CRP.  She was last seen in August.  I had placed her on Lodine.  This did not work and she went back to meloxicam.  More recently she was changed to Celebrex and this seems to be helping better.  She has chronic shoulder problems for which she had been seeing Dr. Hutton.  She saw Dr. Quinones for her left hip pain.  She was felt to have osteoarthritis.  This pain resolve spontaneously.  She then saw Dr. Skelton for her foot pain.  He discussed surgery with her.  She is scheduled to see Dr. Landa for her back problem.    She called me last month about increased aching all over.  She was complaining of swelling in her hands.  I felt she could be having a flare of her rheumatoid arthritis.  I increased her prednisone up to 10 mg daily.  This has helped.  She is felt better for the past few days.  Her pain is especially bad with activity.  It does not wake her up at night.  She denies other recent medical problems.    Physical examination:  Musculoskeletal: She has decreased range of motion cervical spine.  She has decreased range of motion shoulders bilaterally.  She has no synovitis.  Elbows are unremarkable.  She has a synovial cyst of the left wrist.  She has good range of motion of both wrists.  She has mild soft tissue swelling of the MCPs, worse on the right than on the left.  They are not tender.  There is no erythema or increased warmth.  She has bony hypertrophy of the PIPs and DIPs.  These are actually more tender.  She has good range of motion of the hips.  She has postoperative changes of the  knees.  She has tenderness of the right ankle.    Assessment: At this point in time it looks like her problem is more due to pain coming from osteoarthritis than active rheumatoid arthritis.    Plans:  1.  Repeat sedimentation rate and CRP  2.  Decrease prednisone to 7.5 mg daily for 2 weeks and then if stable decreased back to 5 mg daily  3.  If she flares with cutting the prednisone and I will need to add another DMARD, most likely methotrexate  4.  I will see her in follow-up in 4 months.

## 2017-03-13 ENCOUNTER — HOSPITAL ENCOUNTER (OUTPATIENT)
Dept: RADIOLOGY | Facility: HOSPITAL | Age: 82
Discharge: HOME | End: 2017-03-13
Attending: ORTHOPAEDIC SURGERY
Payer: MEDICARE

## 2017-03-13 DIAGNOSIS — M54.16 LUMBAR RADICULOPATHY: ICD-10-CM

## 2017-03-13 DIAGNOSIS — G95.9 CERVICAL MYELOPATHY: ICD-10-CM

## 2017-03-13 PROCEDURE — 72141 MRI NECK SPINE W/O DYE: CPT | Mod: 26,GC,, | Performed by: RADIOLOGY

## 2017-03-13 PROCEDURE — 72148 MRI LUMBAR SPINE W/O DYE: CPT | Mod: TC

## 2017-03-13 PROCEDURE — 72141 MRI NECK SPINE W/O DYE: CPT | Mod: TC

## 2017-03-13 PROCEDURE — 72148 MRI LUMBAR SPINE W/O DYE: CPT | Mod: 26,GC,, | Performed by: RADIOLOGY

## 2017-03-14 ENCOUNTER — OFFICE VISIT (OUTPATIENT)
Dept: ORTHOPEDICS | Facility: CLINIC | Age: 82
End: 2017-03-14
Payer: MEDICARE

## 2017-03-14 VITALS — HEART RATE: 83 BPM | SYSTOLIC BLOOD PRESSURE: 139 MMHG | DIASTOLIC BLOOD PRESSURE: 73 MMHG | HEIGHT: 65 IN

## 2017-03-14 DIAGNOSIS — M54.16 LUMBAR RADICULOPATHY: Primary | ICD-10-CM

## 2017-03-14 DIAGNOSIS — M67.919 ROTATOR CUFF DYSFUNCTION, UNSPECIFIED LATERALITY: ICD-10-CM

## 2017-03-14 PROCEDURE — 99212 OFFICE O/P EST SF 10 MIN: CPT | Mod: S$PBB,,, | Performed by: ORTHOPAEDIC SURGERY

## 2017-03-14 PROCEDURE — 99213 OFFICE O/P EST LOW 20 MIN: CPT | Mod: PBBFAC | Performed by: ORTHOPAEDIC SURGERY

## 2017-03-14 PROCEDURE — 99999 PR PBB SHADOW E&M-EST. PATIENT-LVL III: CPT | Mod: PBBFAC,,, | Performed by: ORTHOPAEDIC SURGERY

## 2017-03-14 RX ORDER — CELECOXIB 200 MG/1
200 CAPSULE ORAL 2 TIMES DAILY
Qty: 60 CAPSULE | Refills: 2 | Status: SHIPPED | OUTPATIENT
Start: 2017-03-14 | End: 2017-07-28 | Stop reason: ALTCHOICE

## 2017-03-14 NOTE — PROGRESS NOTES
The patient returns for follow up.    She is a 81F physician with RA.    Her Cervical MRI is age appropriate, there may be a small anterior C1-2 pannus but no significant cervical stenosis.    In her lumbar spine she has severe multilevel stenosis, worse at L2/3 and L4/5.    Today we discussed options, I have recommended a L4/5 TFESI.    Also will refer to PT for rotator cuff dysfunction.    I spent 10 minutes with the patient of which greater than 1/2 the time was devoted to counciling the patient regarding treatment options.

## 2017-03-23 ENCOUNTER — CLINICAL SUPPORT (OUTPATIENT)
Dept: REHABILITATION | Facility: HOSPITAL | Age: 82
End: 2017-03-23
Attending: ORTHOPAEDIC SURGERY
Payer: MEDICARE

## 2017-03-23 DIAGNOSIS — G89.29 CHRONIC PAIN OF BOTH SHOULDERS: ICD-10-CM

## 2017-03-23 DIAGNOSIS — G89.29 CHRONIC BILATERAL LOW BACK PAIN WITHOUT SCIATICA: ICD-10-CM

## 2017-03-23 DIAGNOSIS — R26.9 ABNORMALITY OF GAIT AND MOBILITY: ICD-10-CM

## 2017-03-23 DIAGNOSIS — M25.511 CHRONIC PAIN OF BOTH SHOULDERS: ICD-10-CM

## 2017-03-23 DIAGNOSIS — M54.50 CHRONIC BILATERAL LOW BACK PAIN WITHOUT SCIATICA: ICD-10-CM

## 2017-03-23 DIAGNOSIS — M25.512 CHRONIC PAIN OF BOTH SHOULDERS: ICD-10-CM

## 2017-03-23 PROCEDURE — G8979 MOBILITY GOAL STATUS: HCPCS | Mod: CI,PO | Performed by: PHYSICAL THERAPIST

## 2017-03-23 PROCEDURE — 97162 PT EVAL MOD COMPLEX 30 MIN: CPT | Mod: PO | Performed by: PHYSICAL THERAPIST

## 2017-03-23 PROCEDURE — G8978 MOBILITY CURRENT STATUS: HCPCS | Mod: CJ,PO | Performed by: PHYSICAL THERAPIST

## 2017-03-23 NOTE — PLAN OF CARE
Physical Therapy Evaluation    Name: Selma Rosado MD Jose J  Clinic Number: [id number]        Diagnosis:   Encounter Diagnoses   Name Primary?    Chronic pain of both shoulders     Chronic bilateral low back pain without sciatica     Abnormality of gait and mobility      Physician: Raciel Landa MD  Treatment Orders: PT Eval and Treat    Past Medical History:   Diagnosis Date    Anemia     Arthritis     Hashimoto's disease     Hypertension     IGT (impaired glucose tolerance) 1/12/2016    Joint pain     Multinodular goiter 1/12/2016     Current Outpatient Prescriptions   Medication Sig    amlodipine (NORVASC) 5 MG tablet Take 1 tablet (5 mg total) by mouth once daily.    celecoxib (CELEBREX) 200 MG capsule Take 1 capsule (200 mg total) by mouth 2 (two) times daily.    esomeprazole (NEXIUM) 40 MG capsule Take 1 capsule (40 mg total) by mouth once daily. 1 Capsule, Delayed Release(E.C.) Oral Every day    ferrous sulfate 325 (65 FE) MG EC tablet Take 325 mg by mouth 2 (two) times daily.     hydrocodone-acetaminophen 5-325mg (NORCO) 5-325 mg per tablet     hydrocodone-acetaminophen 7.5-325mg (NORCO) 7.5-325 mg per tablet Take 1 tablet by mouth 2 (two) times daily as needed for Pain.    hydroxychloroquine (PLAQUENIL) 200 mg tablet TAKE 2 TABLETS ONCE DAILY    montelukast (SINGULAIR) 10 mg tablet Take 1 tablet (10 mg total) by mouth every evening.    predniSONE (DELTASONE) 5 MG tablet Take 2 tablets (10 mg total) by mouth once daily.     No current facility-administered medications for this visit.      Review of patient's allergies indicates:  No Known Allergies  Precautions: standard, fall     Evaluation Date: 3/23/17  Visit # authorized: 1/20  Authorization period: 12/31/17  Plan of care Expiration: 5/18/17          Subjective       Onset Date: years  Social History: 1 story home, 5 steps to enter  Med History: HTN, hashimoto's disease, Rheumatoid arthritis, B rotator cuff tears  Occupation:  retired general medicine physician      History of Present Illness: Selma is a 81 y.o. female that presents to [facility] secondary to rotator cuff syndrom bilaterally and lumbar radiculopathy. Selma states she has had pain for about 2 years. Pt states she had shoulder MRI at [facility] and she has rotator cuff tears bilaterally, she has had PT on her shoulders at [facility], says she just stopped going because she got tired of going.  She feels she did not have enough reason to have surgery.    Pt complains of low back pain, states she feels like her spine is compressed. It hurts to stand up. She feels like she is walking bent over. occasionaly has pain down her legs, but states this isn't her problem.  She feels she has problems with her walking and balance,and not being able to stand up straight.    Pt states in the past she had both knees replaced. Feels she did not get her strength back like she should have, didn't put in the effort she should have with therapy.    Pt states years ago (2004/5) she tore her gluteus medius on the L.      Imaging: None for shoulder   MRI of lumbar spine taken and revealed Impression        Severe degenerative changes of the lumbar spine, noting severe spinal canal stenosis at L4-5 and L5-S1, as well as moderate spinal canal stenosis at L2-3 and L3-4.  Additional details as above.    S-shaped scoliosis of the thoracolumbar spine.         .    Pain: current 4/10, worst 9/10, best 3/10    Pts goals: pt wants to improve her gait and balance and to be stand up straight        Objective     Observation: antalgic gait, L trendelenburg, decreased toe floor clearance, genu valgus bilaterally    Posture: forward head, rounded shoulders, S curve at thoracolumbar junction       Active Range of Motion:   Shoulder Left Right   Flexion 65 70   Abduction 40 55   ER at 0 70 45   ER at 90 unable unable   IR Sacrum sacrum     Upper Extremity Strength  (R) UE  (L) UE    Shoulder flexion: 3-/5  Shoulder flexion: 3-/5   Shoulder Abduction: 3-/5 Shoulder abduction: 3-/5   Shoulder ER 3+/5 Shoulder ER 3+/5   Shoulder IR 3+/5 Shoulder IR 3+/5       Lower Extremity Strength  Right LE  Left LE    Knee extension: 4/5 Knee extension: 4/5   Knee flexion: 3+/5 Knee flexion: 3+/5   Hip flexion: 3/5 Hip flexion: 4+/5   Hip extension:  2+/5 Hip extension: 2+/5   Hip abduction: 3+/5 Hip abduction: 3-/5   Ankle dorsiflexion: 4+/5 Ankle dorsiflexion: 4+/5   Ankle plantarflexion: 4+/5 Ankle plantarflexion: 4+/5           Flexibility:    Ely's test: R = 40 degrees ; L = 40 degrees   Popliteal Angle: R = -30 degrees ; L = -30 degrees   Hip flexor tightness bilaterally, unable to manually extend hips to neutral      Endurance Deficit: yes    Evaluation   Timed Up and Go 14.52 sec   Self Selected Walking Speed 0.877 m/sec   Fast Walking Speed 0.871 m/sec   30 second Chair Rise 10 completed           Dynamic Gait Index  1. Gait on level surface: 2  2. Change in Gait speed: 2  3. Gait with Horizontal Head Turns: 1  4. Gait with Vertical Head Turns: 2  5. Gait and Pivot turn: 3  6. Step Over Obstacle: 3  7. Step around obstacles: 2  8. Steps: 2    Score: 17/24    Functional Limitations  Reports - G Codes    Category:  mobility   Tool:  Dynamic Gait Index   Score:  17/24   Current:   CJ 20<40% impaired   Goal:   CI 1 < 20% impaired           PT Evaluation Completed? Yes  Discussed Plan of Care with patient: Yes        Assessment     This is a 81 y.o. female referred to outpatient physical therapy and presents with a medical diagnosis of rotator cuff dysfuciton and lumbar radiculopathy and demonstrates limitations as described in the problem list. Pt will benefit from physcial therapy services in order to maximize pain free and/or functional mobility. The following goals were discussed with the patient and patient is in agreement with them as to be addressed in the treatment plan. .       Medical necessity is demonstrated  by the following IMPAIRMENTS/PROBLEM LIST:     History  Co-morbidities and personal factors that may impact the plan of care Examination  Body Structures and Functions, activity limitations and participation restrictions that may impact the plan of care Clinical Presentation   Decision Making/ Complexity Score   Co-morbidities:     HTN, hashimoto's disease, Rheumatoid arthritis, B rotator cuff tears. glute med tear            Personal Factors:   Previous non-compliance with PT Body Regions:  Shoulder pain  Back pain  Body Systems:   Decreased shoulder ROM  Decreased LE strength  Decreased flexibility  Impaired gait and mobility  Impaired balance        Activity limitations:   Walking long distances  Prolonged standing    Participation Restrictions:   Cannot wear high heels       Pt presents with long history of lower back pain with lumbar radiculopathy and rotator cuff syndrome for B rotator cuff tears. Her gait and balance are impaired. Her symptoms of fluctuating moderate       Anticipated Barriers for physical therapy: previous non-compliance with PT    Short term goals: 4 weeks, pt agrees to goals set.  1. Pt to increase her DGI to > or = 20 in order to demonstrate improved balance .  2. Pt to increase B Ely's test to > to = 50 degrees to improve LE flexibility and mobility  3. Pt to improve her hip flexor flexibility to be able to extend hips to neutral in order to improve gait and mobility.  4. Pt will decrease TUG score by > or = 2 seconds in order to demonstrate decreased risk for falls.  5. Pt will increase MMT for B hip abductors to > or = 4-/5 in order to improve balance.      Long term goals: 8 weeks, pt agrees to goals set  1. Pt will score at the CI level ( 1<20 impaired) on the DGI in order to demonstrate improved balance and mobility.  2. Pt will improve TUG score by > or = 4 seconds to decrease risk for falls in the home and community environment.  3. Increased MMT for B hip extensors  to > or = 3/5  in order to improve gait and mobility.  4. Pt will increase Ely  5. Pt will be able to perform > or = 15 chair rises in order to demonstrate improved LE strength and endurance.         Plan     Pt will be treated by physical therapy 2 times a week for 8 weeks for Pt Education, HEP, therapeutic exercises, neuromuscular re-education, joint mobilizations, modalities prn to achieve established goals. Selma may at times be seen by a PTA as part of the Rehab Team.     Cont PT for  8         weeks.     Jeremy Goodwin, PT 3/23/2017     I certify the need for these services furnished under this plan of treatment and while under my care.______________________________ Physician/Referring Practitioner  Date of Signature

## 2017-03-23 NOTE — PROGRESS NOTES
Physical Therapy Evaluation    Name: Selma Rosado MD Jose J  Clinic Number: [id number]        Diagnosis:   Encounter Diagnoses   Name Primary?    Chronic pain of both shoulders     Chronic bilateral low back pain without sciatica     Abnormality of gait and mobility      Physician: Raciel Landa MD  Treatment Orders: PT Eval and Treat    Past Medical History:   Diagnosis Date    Anemia     Arthritis     Hashimoto's disease     Hypertension     IGT (impaired glucose tolerance) 1/12/2016    Joint pain     Multinodular goiter 1/12/2016     Current Outpatient Prescriptions   Medication Sig    amlodipine (NORVASC) 5 MG tablet Take 1 tablet (5 mg total) by mouth once daily.    celecoxib (CELEBREX) 200 MG capsule Take 1 capsule (200 mg total) by mouth 2 (two) times daily.    esomeprazole (NEXIUM) 40 MG capsule Take 1 capsule (40 mg total) by mouth once daily. 1 Capsule, Delayed Release(E.C.) Oral Every day    ferrous sulfate 325 (65 FE) MG EC tablet Take 325 mg by mouth 2 (two) times daily.     hydrocodone-acetaminophen 5-325mg (NORCO) 5-325 mg per tablet     hydrocodone-acetaminophen 7.5-325mg (NORCO) 7.5-325 mg per tablet Take 1 tablet by mouth 2 (two) times daily as needed for Pain.    hydroxychloroquine (PLAQUENIL) 200 mg tablet TAKE 2 TABLETS ONCE DAILY    montelukast (SINGULAIR) 10 mg tablet Take 1 tablet (10 mg total) by mouth every evening.    predniSONE (DELTASONE) 5 MG tablet Take 2 tablets (10 mg total) by mouth once daily.     No current facility-administered medications for this visit.      Review of patient's allergies indicates:  No Known Allergies  Precautions: standard, fall     Evaluation Date: 3/23/17  Visit # authorized: 1/20  Authorization period: 12/31/17  Plan of care Expiration: 5/18/17          Subjective       Onset Date: years  Social History: 1 story home, 5 steps to enter  Med History: HTN, hashimoto's disease, Rheumatoid arthritis, B rotator cuff tears  Occupation:  retired general medicine physician      History of Present Illness: Selma is a 81 y.o. female that presents to [facility] secondary to rotator cuff syndrom bilaterally and lumbar radiculopathy. Selma states she has had pain for about 2 years. Pt states she had shoulder MRI at [facility] and she has rotator cuff tears bilaterally, she has had PT on her shoulders at [facility], says she just stopped going because she got tired of going.  She feels she did not have enough reason to have surgery.    Pt complains of low back pain, states she feels like her spine is compressed. It hurts to stand up. She feels like she is walking bent over. occasionaly has pain down her legs, but states this isn't her problem.  She feels she has problems with her walking and balance,and not being able to stand up straight.    Pt states in the past she had both knees replaced. Feels she did not get her strength back like she should have, didn't put in the effort she should have with therapy.    Pt states years ago (2004/5) she tore her gluteus medius on the L.      Imaging: None for shoulder   MRI of lumbar spine taken and revealed Impression        Severe degenerative changes of the lumbar spine, noting severe spinal canal stenosis at L4-5 and L5-S1, as well as moderate spinal canal stenosis at L2-3 and L3-4.  Additional details as above.    S-shaped scoliosis of the thoracolumbar spine.         .    Pain: current 4/10, worst 9/10, best 3/10    Pts goals: pt wants to improve her gait and balance and to be stand up straight        Objective     Observation: antalgic gait, L trendelenburg, decreased toe floor clearance, genu valgus bilaterally    Posture: forward head, rounded shoulders, S curve at thoracolumbar junction       Active Range of Motion:   Shoulder Left Right   Flexion 65 70   Abduction 40 55   ER at 0 70 45   ER at 90 unable unable   IR Sacrum sacrum     Upper Extremity Strength  (R) UE  (L) UE    Shoulder flexion: 3-/5  Shoulder flexion: 3-/5   Shoulder Abduction: 3-/5 Shoulder abduction: 3-/5   Shoulder ER 3+/5 Shoulder ER 3+/5   Shoulder IR 3+/5 Shoulder IR 3+/5       Lower Extremity Strength  Right LE  Left LE    Knee extension: 4/5 Knee extension: 4/5   Knee flexion: 3+/5 Knee flexion: 3+/5   Hip flexion: 3/5 Hip flexion: 4+/5   Hip extension:  2+/5 Hip extension: 2+/5   Hip abduction: 3+/5 Hip abduction: 3-/5   Ankle dorsiflexion: 4+/5 Ankle dorsiflexion: 4+/5   Ankle plantarflexion: 4+/5 Ankle plantarflexion: 4+/5           Flexibility:    Ely's test: R = 40 degrees ; L = 40 degrees   Popliteal Angle: R = -30 degrees ; L = -30 degrees   Hip flexor tightness bilaterally, unable to manually extend hips to neutral      Endurance Deficit: yes    Evaluation   Timed Up and Go 14.52 sec   Self Selected Walking Speed 0.877 m/sec   Fast Walking Speed 0.871 m/sec   30 second Chair Rise 10 completed           Dynamic Gait Index  1. Gait on level surface: 2  2. Change in Gait speed: 2  3. Gait with Horizontal Head Turns: 1  4. Gait with Vertical Head Turns: 2  5. Gait and Pivot turn: 3  6. Step Over Obstacle: 3  7. Step around obstacles: 2  8. Steps: 2    Score: 17/24    Functional Limitations  Reports - G Codes    Category:  mobility   Tool:  Dynamic Gait Index   Score:  17/24   Current:   CJ 20<40% impaired   Goal:   CI 1 < 20% impaired           PT Evaluation Completed? Yes  Discussed Plan of Care with patient: Yes        Assessment     This is a 81 y.o. female referred to outpatient physical therapy and presents with a medical diagnosis of rotator cuff dysfuciton and lumbar radiculopathy and demonstrates limitations as described in the problem list. Pt will benefit from physcial therapy services in order to maximize pain free and/or functional mobility. The following goals were discussed with the patient and patient is in agreement with them as to be addressed in the treatment plan. .       Medical necessity is demonstrated  by the following IMPAIRMENTS/PROBLEM LIST    History  Co-morbidities and personal factors that may impact the plan of care Examination  Body Structures and Functions, activity limitations and participation restrictions that may impact the plan of care Clinical Presentation   Decision Making/ Complexity Score   Co-morbidities:     HTN, hashimoto's disease, Rheumatoid arthritis, B rotator cuff tears. glute med tear            Personal Factors:   Previous non-compliance with PT Body Regions:  Shoulder pain  Back pain  Body Systems:   Decreased shoulder ROM  Decreased LE strength  Decreased flexibility  Impaired gait and mobility  Impaired balance        Activity limitations:   Walking long distances  Prolonged standing    Participation Restrictions:   Cannot wear high heels       Pt presents with long history of lower back pain with lumbar radiculopathy and rotator cuff syndrome for B rotator cuff tears. Her gait and balance are impaired. Her symptoms of fluctuating moderate           Anticipated Barriers for physical therapy: previous non-compliance with PT    Short term goals: 4 weeks, pt agrees to goals set.  1. Pt to increase her DGI to > or = 20 in order to demonstrate improved balance .  2. Pt to increase B Ely's test to > to = 50 degrees to improve LE flexibility and mobility  3. Pt to improve her hip flexor flexibility to be able to extend hips to neutral in order to improve gait and mobility.  4. Pt will decrease TUG score by > or = 2 seconds in order to demonstrate decreased risk for falls.  5. Pt will increase MMT for B hip abductors to > or = 4-/5 in order to improve balance.      Long term goals: 8 weeks, pt agrees to goals set  1. Pt will score at the CI level ( 1<20 impaired) on the DGI in order to demonstrate improved balance and mobility.  2. Pt will improve TUG score by > or = 4 seconds to decrease risk for falls in the home and community environment.  3. Increased MMT for B hip extensors  to > or =  3/5 in order to improve gait and mobility.  4. Pt will increase Ely  5. Pt will be able to perform > or = 15 chair rises in order to demonstrate improved LE strength and endurance.         Plan     Pt will be treated by physical therapy 2 times a week for 8 weeks for Pt Education, HEP, therapeutic exercises, neuromuscular re-education, joint mobilizations, modalities prn to achieve established goals. Selma may at times be seen by a PTA as part of the Rehab Team.     Cont PT for  8         weeks.     Jeremy Goodwin, PT 3/23/2017     I certify the need for these services furnished under this plan of treatment and while under my care.______________________________ Physician/Referring Practitioner  Date of Signature

## 2017-03-30 ENCOUNTER — HOSPITAL ENCOUNTER (OUTPATIENT)
Dept: RADIOLOGY | Facility: HOSPITAL | Age: 82
Discharge: HOME | End: 2017-03-30
Attending: COLON & RECTAL SURGERY
Payer: MEDICARE

## 2017-03-30 ENCOUNTER — OFFICE VISIT (OUTPATIENT)
Dept: SURGERY | Facility: CLINIC | Age: 82
End: 2017-03-30
Payer: MEDICARE

## 2017-03-30 ENCOUNTER — TELEPHONE (OUTPATIENT)
Dept: SURGERY | Facility: CLINIC | Age: 82
End: 2017-03-30

## 2017-03-30 VITALS
HEART RATE: 91 BPM | BODY MASS INDEX: 27 KG/M2 | DIASTOLIC BLOOD PRESSURE: 61 MMHG | SYSTOLIC BLOOD PRESSURE: 144 MMHG | WEIGHT: 162.06 LBS | HEIGHT: 65 IN

## 2017-03-30 DIAGNOSIS — R10.84 GENERALIZED ABDOMINAL PAIN: Primary | ICD-10-CM

## 2017-03-30 DIAGNOSIS — R10.84 GENERALIZED ABDOMINAL PAIN: ICD-10-CM

## 2017-03-30 PROCEDURE — 74020 XR ABDOMEN FLAT AND ERECT: CPT | Mod: TC

## 2017-03-30 PROCEDURE — 99999 PR PBB SHADOW E&M-EST. PATIENT-LVL III: CPT | Mod: PBBFAC,,, | Performed by: COLON & RECTAL SURGERY

## 2017-03-30 PROCEDURE — 99213 OFFICE O/P EST LOW 20 MIN: CPT | Mod: PBBFAC,25 | Performed by: COLON & RECTAL SURGERY

## 2017-03-30 PROCEDURE — 74020 XR ABDOMEN FLAT AND ERECT: CPT | Mod: 26,,, | Performed by: RADIOLOGY

## 2017-03-30 PROCEDURE — 99213 OFFICE O/P EST LOW 20 MIN: CPT | Mod: S$PBB,,, | Performed by: COLON & RECTAL SURGERY

## 2017-03-30 NOTE — PROGRESS NOTES
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 Feels distended ehich has been waxing and waning over the last few days  c-scope 9/25/15 for hx of polyps had small polyp (Adenoma) by Daniel    ROS:     Review of Systems      Constitutional: No fever, chills, or change in activity or appetite.      HENT: No hearing loss, swelling, neck pain or stiffness.       Eyes: No  Itching, discharge, and visual disturbance.      Respiratory: No cough, choking or shortness of breath.       Cardiovascular: No leg swelling or chest pain      Gastrointestinal: No abdominal distention or change in bowel habbits     Genitourinary: No dysuria, frequency or flank pain.      Musculoskeletal: No trouble walking or arthralgias.      Neurological: No weakness, dizziness, or seizures .      Hematological: No adenopathy.      Psychiatric/Behavioral: No hallucinations or changes in behavior.  Remainder ROS  Negative except per HPI        PE:\  BP (!) 144/61  Pulse 91  Ht 5' 5" (1.651 m)  Wt 73.5 kg (162 lb 0.6 oz)  BMI 26.96 kg/m2      APPEARANCE: Well nourished, well developed, in no acute distress.   CHEST: Lungs clear. Normal respiratory effort.  CARDIOVASCULAR: Normal rhythm. No edema.  ABDOMEN: Soft , non distended , no pain on palpation or percussion , no lymphadenoppayh  Rectum:  Normal skin,  Rectal defererred per patient    Musculoskeletal: Symmetric, normal range of motion and strength.   Neurological: Alert and oriented to person, place, and time. Normal reflexes.   Skin: Skin is warm and dry.   Psychiatric: Normal mood and affect. Behavior is normal and appropriate.     Impression:  Abdominal pain of unkown etiology   Will check a CBC and KUB, possible functional constipation  If so will refer to GI  "

## 2017-04-04 ENCOUNTER — CLINICAL SUPPORT (OUTPATIENT)
Dept: REHABILITATION | Facility: HOSPITAL | Age: 82
End: 2017-04-04
Attending: ORTHOPAEDIC SURGERY
Payer: MEDICARE

## 2017-04-04 DIAGNOSIS — M54.50 CHRONIC BILATERAL LOW BACK PAIN WITHOUT SCIATICA: ICD-10-CM

## 2017-04-04 DIAGNOSIS — M25.512 CHRONIC PAIN OF BOTH SHOULDERS: ICD-10-CM

## 2017-04-04 DIAGNOSIS — M25.511 CHRONIC PAIN OF BOTH SHOULDERS: ICD-10-CM

## 2017-04-04 DIAGNOSIS — G89.29 CHRONIC PAIN OF BOTH SHOULDERS: ICD-10-CM

## 2017-04-04 DIAGNOSIS — G89.29 CHRONIC BILATERAL LOW BACK PAIN WITHOUT SCIATICA: ICD-10-CM

## 2017-04-04 DIAGNOSIS — R26.9 ABNORMALITY OF GAIT AND MOBILITY: ICD-10-CM

## 2017-04-04 PROCEDURE — 97110 THERAPEUTIC EXERCISES: CPT | Mod: PO | Performed by: PHYSICAL THERAPIST

## 2017-04-04 PROCEDURE — 97112 NEUROMUSCULAR REEDUCATION: CPT | Mod: PO | Performed by: PHYSICAL THERAPIST

## 2017-04-04 NOTE — PROGRESS NOTES
"                                                    Physical Therapy Progress Note     Name: Selma Rosado MD Jose J  Clinic Number: [id number]  Diagnosis:   Encounter Diagnoses   Name Primary?    Chronic pain of both shoulders     Chronic bilateral low back pain without sciatica     Abnormality of gait and mobility      Physician: Raciel Landa MD  Treatment Orders: PT Eval and Treat  Past Medical History:   Diagnosis Date    Anemia     Arthritis     Hashimoto's disease     Hypertension     IGT (impaired glucose tolerance) 1/12/2016    Joint pain     Multinodular goiter 1/12/2016       Precautions: standard  Evaluation Date: 3/23/17  Visit # authorized: 2 /20  Authorization period: 12/31/17  Plan of care Expiration: 5/18/17    Missed visits:  no show(1),  Cancellations(1)    Subjective   Pt reports: "sorry I missed last week I was sick"  Pain Scale:  4/10 R hip/LE pain    Objective     Patient received individual therapy to increase strength, endurance, ROM and core stabilization with 1 patients with activities as follows:     Slema received therapeutic exercises to develop strength, endurance, ROM, flexibility and core stabilization for 30 minutes including: one on one TX x 15 minutes    Standing hip extension: 2 x 10 B  Standing hip abduction: 2 x 10 B  Supine sciatic nerve glide: x 30 B  Supine hip flexor stretch: 2 x 1' B  Squats to chair: 2 x 10      Patient participated in neuromuscular re-education activities to improve: Balance, Coordination, Kinesthetic and Sense for 20 minutes. The following activities were included: one on one  x12 minutes    Heel toe raises on foam x 30  SL balance on foam with 1 UE support: 3 x 20 B  Marching on foam with UE support 2 x 30  Weaving cones: x 5 laps      Education provided re:  No spiritual or educational barriers to learning provided    Pt has no cultural, educational or language barriers to learning provided.  Assessment   Pt tolerated therapy well. " Required cueing for proper execution of all exercises. Will increase balance exercises as tolerated. Pt prognosis is Fair. Pt will continue to benefit from skilled outpatient physical therapy to address the deficits listed below in the problem list, provide pt/family education and to maximize pt's level of independence in the home and community environment.   Anticipated barriers to physical therapy: none    Pt's spiritual, cultural and educational needs considered and pt agreeable to plan of care and goals as stated below:     Short term goals: 4 weeks, pt agrees to goals set.  1. Pt to increase her DGI to > or = 20 in order to demonstrate improved balance .  2. Pt to increase B Ely's test to > to = 50 degrees to improve LE flexibility and mobility  3. Pt to improve her hip flexor flexibility to be able to extend hips to neutral in order to improve gait and mobility.  4. Pt will decrease TUG score by > or = 2 seconds in order to demonstrate decreased risk for falls.  5. Pt will increase MMT for B hip abductors to > or = 4-/5 in order to improve balance.        Long term goals: 8 weeks, pt agrees to goals set  1. Pt will score at the CI level ( 1<20 impaired) on the DGI in order to demonstrate improved balance and mobility.  2. Pt will improve TUG score by > or = 4 seconds to decrease risk for falls in the home and community environment.  3. Increased MMT for B hip extensors to > or = 3/5 in order to improve gait and mobility.  4. Pt will increase Ely  5. Pt will be able to perform > or = 15 chair rises in order to demonstrate improved LE strength and endurance.      Plan   Continue with established Plan of Care towards PT goals.  Jeremy Goodwin, PT 4/4/2017

## 2017-04-06 ENCOUNTER — CLINICAL SUPPORT (OUTPATIENT)
Dept: REHABILITATION | Facility: HOSPITAL | Age: 82
End: 2017-04-06
Attending: ORTHOPAEDIC SURGERY
Payer: MEDICARE

## 2017-04-06 ENCOUNTER — PROCEDURE VISIT (OUTPATIENT)
Dept: NEUROLOGY | Facility: CLINIC | Age: 82
End: 2017-04-06
Payer: MEDICARE

## 2017-04-06 DIAGNOSIS — M25.512 CHRONIC PAIN OF BOTH SHOULDERS: ICD-10-CM

## 2017-04-06 DIAGNOSIS — M25.511 CHRONIC PAIN OF BOTH SHOULDERS: ICD-10-CM

## 2017-04-06 DIAGNOSIS — R20.0 NUMBNESS: ICD-10-CM

## 2017-04-06 DIAGNOSIS — G89.29 CHRONIC PAIN OF BOTH SHOULDERS: ICD-10-CM

## 2017-04-06 DIAGNOSIS — M54.50 CHRONIC BILATERAL LOW BACK PAIN WITHOUT SCIATICA: ICD-10-CM

## 2017-04-06 DIAGNOSIS — G89.29 CHRONIC BILATERAL LOW BACK PAIN WITHOUT SCIATICA: ICD-10-CM

## 2017-04-06 DIAGNOSIS — R26.9 ABNORMALITY OF GAIT AND MOBILITY: ICD-10-CM

## 2017-04-06 PROCEDURE — 95886 MUSC TEST DONE W/N TEST COMP: CPT | Mod: 26,S$PBB,,

## 2017-04-06 PROCEDURE — 95886 MUSC TEST DONE W/N TEST COMP: CPT | Mod: PBBFAC

## 2017-04-06 PROCEDURE — 97112 NEUROMUSCULAR REEDUCATION: CPT | Mod: PO | Performed by: PHYSICAL THERAPIST

## 2017-04-06 PROCEDURE — 95910 NRV CNDJ TEST 7-8 STUDIES: CPT | Mod: PBBFAC

## 2017-04-06 PROCEDURE — 97110 THERAPEUTIC EXERCISES: CPT | Mod: PO | Performed by: PHYSICAL THERAPIST

## 2017-04-06 PROCEDURE — 95910 NRV CNDJ TEST 7-8 STUDIES: CPT | Mod: 26,S$PBB,,

## 2017-04-06 NOTE — PROGRESS NOTES
"                                                    Physical Therapy Progress Note     Name: Selma Rosado MD Jose J  Clinic Number: [id number]  Diagnosis:   Encounter Diagnoses   Name Primary?    Chronic pain of both shoulders     Chronic bilateral low back pain without sciatica     Abnormality of gait and mobility      Physician: Raciel Landa MD  Treatment Orders: PT Eval and Treat  Past Medical History:   Diagnosis Date    Anemia     Arthritis     Hashimoto's disease     Hypertension     IGT (impaired glucose tolerance) 1/12/2016    Joint pain     Multinodular goiter 1/12/2016       Precautions: standard  Evaluation Date: 3/23/17  Visit # authorized: 3 /20  Authorization period: 12/31/17  Plan of care Expiration: 5/18/17    Missed visits:  no show(1),  Cancellations(1)    Subjective   Pt reports: that her leg is hurting more today  Pain Scale:  6/10 R hip/LE pain    Objective     Patient received individual therapy to increase strength, endurance, ROM and core stabilization with 1 patients with activities as follows:     Selma received therapeutic exercises to develop strength, endurance, ROM, flexibility and core stabilization for 40 minutes including: one on one TX x 15 minutes    Bike x 6 minutes for LE strength and endurance and hip ROM  Supine sciatic nerve glide: x 30 B  Supine hip flexor stretch: 2 x 1' B  Piriformis stretch: 3 x 30" B  Standing hip extension: 2 x 10 B  Standing hip abduction: 2 x 10 B  Squats to chair: 2 x 10 - NP          Patient participated in neuromuscular re-education activities to improve: Balance, Coordination, Kinesthetic and Sense for 10 minutes. The following activities were included: one on one  x10 minutes    Heel toe raises on foam x 30  SL balance on foam with 1 UE support: 3 x 20 B  Marching on foam with UE support 2 x 30  Weaving cones: x 5 laps - NP      Education provided re:  No spiritual or educational barriers to learning provided    Pt has no cultural, " educational or language barriers to learning provided.  Assessment   Pt tolerated therapy well. Pt with increased hip and LE pain today, improved after using the bike. Increased difficulty wit balance exercises today. Pt prognosis is Fair. Pt will continue to benefit from skilled outpatient physical therapy to address the deficits listed below in the problem list, provide pt/family education and to maximize pt's level of independence in the home and community environment.   Anticipated barriers to physical therapy: none    Pt's spiritual, cultural and educational needs considered and pt agreeable to plan of care and goals as stated below:     Short term goals: 4 weeks, pt agrees to goals set.  1. Pt to increase her DGI to > or = 20 in order to demonstrate improved balance .  2. Pt to increase B Ely's test to > to = 50 degrees to improve LE flexibility and mobility  3. Pt to improve her hip flexor flexibility to be able to extend hips to neutral in order to improve gait and mobility.  4. Pt will decrease TUG score by > or = 2 seconds in order to demonstrate decreased risk for falls.  5. Pt will increase MMT for B hip abductors to > or = 4-/5 in order to improve balance.        Long term goals: 8 weeks, pt agrees to goals set  1. Pt will score at the CI level ( 1<20 impaired) on the DGI in order to demonstrate improved balance and mobility.  2. Pt will improve TUG score by > or = 4 seconds to decrease risk for falls in the home and community environment.  3. Increased MMT for B hip extensors to > or = 3/5 in order to improve gait and mobility.  4. Pt will increase Ely  5. Pt will be able to perform > or = 15 chair rises in order to demonstrate improved LE strength and endurance.      Plan   Continue with established Plan of Care towards PT goals.  Jeremy Goodwin, PT 4/6/2017

## 2017-04-06 NOTE — PROCEDURES
Procedures     See the copy of this report that has been scanned into patient's Epic Chart.     LISA TOTH III, MD

## 2017-04-07 RX ORDER — HYDROCODONE BITARTRATE AND ACETAMINOPHEN 5; 325 MG/1; MG/1
1 TABLET ORAL
Qty: 30 TABLET | Refills: 0 | Status: SHIPPED | OUTPATIENT
Start: 2017-04-07 | End: 2017-07-28 | Stop reason: SDUPTHER

## 2017-04-07 NOTE — TELEPHONE ENCOUNTER
----- Message from Julio César Renteria MA sent at 4/7/2017 11:26 AM CDT -----  Contact: self - [phone]   Type: Rx    Name of medication(s): hydrocodone-acetaminophen 7.5-325mg (NORCO) 7.5-325 mg per tablet    Is this a refill? New rx? Refill     Who prescribed medication?    Pharmacy Name, Phone, & Location[facility] [address] [place] [address] [address] AT [place] [address]    Comments: hip pain. Please call. Thanks!

## 2017-04-17 ENCOUNTER — TELEPHONE (OUTPATIENT)
Dept: INTERNAL MEDICINE | Facility: CLINIC | Age: 82
End: 2017-04-17

## 2017-04-17 DIAGNOSIS — M79.605 PAIN IN BOTH LOWER EXTREMITIES: ICD-10-CM

## 2017-04-17 DIAGNOSIS — M48.061 LUMBAR STENOSIS: Primary | ICD-10-CM

## 2017-04-17 DIAGNOSIS — M79.604 PAIN IN BOTH LOWER EXTREMITIES: ICD-10-CM

## 2017-04-17 NOTE — TELEPHONE ENCOUNTER
----- Message from Santana Rogersr sent at 4/17/2017  2:06 PM CDT -----  Contact: self/ [phone] cell  Pt is still experiencing symptoms of numbness and tingling in the right leg, sometimes pain in the right groin, and also some lightheadedness.  She would like to speak with Dr. Hirsch to get some advisement and recommendations on what she can do or maybe who she can see as soon as possible.  She would like to speak with someone in the office today, if possible.  Please call and advise.    Thank you

## 2017-04-19 ENCOUNTER — TELEPHONE (OUTPATIENT)
Dept: INTERNAL MEDICINE | Facility: CLINIC | Age: 82
End: 2017-04-19

## 2017-04-26 ENCOUNTER — DOCUMENTATION ONLY (OUTPATIENT)
Dept: REHABILITATION | Facility: HOSPITAL | Age: 82
End: 2017-04-26

## 2017-04-26 NOTE — PROGRESS NOTES
PHYSICAL THERAPY DISCHARGE SUMMARY     Name: Selma Rosado MD Jose J  Clinic Number: [id number]    Diagnosis: B shoulder pain, LBP  Physician: Raciel Landa  Treatment Orders: PT Eval and Treat  Past Medical History:   Diagnosis Date    Anemia     Arthritis     Hashimoto's disease     Hypertension     IGT (impaired glucose tolerance) 1/12/2016    Joint pain     Multinodular goiter 1/12/2016       Initial visit: 3/23/17  Date of Last visit: 4/6/17  Date of Discharge Note:  4/26/17  Total Visits Received: 3  Missed Visits: 3  ASSESSMENT   Status Towards Goals Met:  Pt did not meet any of her goals.    Goals Not achieved and why: Pt only attended 2 visits after her evaluation. She cancelled 3 visits and no showed for 1    Discharge reason : Non-Compliance with attendance and Pt has not re-scheduled further follow-up sessions    G-CODE: Discharge g-code not filed due to discharge note being documentation only. Upon eval Pt was at CJ (20<40%) on the DGI with g-code goal set at CI (1 < 20%).    Short term goals: 4 weeks, pt agrees to goals set.  1. Pt to increase her DGI to > or = 20 in order to demonstrate improved balance .  2. Pt to increase B Ely's test to > to = 50 degrees to improve LE flexibility and mobility  3. Pt to improve her hip flexor flexibility to be able to extend hips to neutral in order to improve gait and mobility.  4. Pt will decrease TUG score by > or = 2 seconds in order to demonstrate decreased risk for falls.  5. Pt will increase MMT for B hip abductors to > or = 4-/5 in order to improve balance.        Long term goals: 8 weeks, pt agrees to goals set  1. Pt will score at the CI level ( 1<20 impaired) on the DGI in order to demonstrate improved balance and mobility.  2. Pt will improve TUG score by > or = 4 seconds to decrease risk for falls in the home and community environment.  3. Increased MMT for B hip extensors to > or = 3/5 in order to improve gait and mobility.  4. Pt will increase  Ely  5. Pt will be able to perform > or = 15 chair rises in order to demonstrate improved LE strength and endurance.     PLAN   This patient is discharged from Physical Therapy Services.       Jeremy Goodwin, PT 4/26/2017

## 2017-04-28 ENCOUNTER — OFFICE VISIT (OUTPATIENT)
Dept: PAIN MEDICINE | Facility: CLINIC | Age: 82
End: 2017-04-28
Attending: ANESTHESIOLOGY
Payer: MEDICARE

## 2017-04-28 VITALS
RESPIRATION RATE: 20 BRPM | SYSTOLIC BLOOD PRESSURE: 128 MMHG | BODY MASS INDEX: 27.44 KG/M2 | HEART RATE: 72 BPM | WEIGHT: 164.69 LBS | HEIGHT: 65 IN | DIASTOLIC BLOOD PRESSURE: 72 MMHG

## 2017-04-28 DIAGNOSIS — M25.512 CHRONIC PAIN OF BOTH SHOULDERS: ICD-10-CM

## 2017-04-28 DIAGNOSIS — M25.511 CHRONIC PAIN OF BOTH SHOULDERS: ICD-10-CM

## 2017-04-28 DIAGNOSIS — G89.29 CHRONIC PAIN OF BOTH SHOULDERS: ICD-10-CM

## 2017-04-28 DIAGNOSIS — M54.16 LUMBAR RADICULOPATHY: Primary | ICD-10-CM

## 2017-04-28 DIAGNOSIS — M48.061 LUMBAR STENOSIS: ICD-10-CM

## 2017-04-28 PROCEDURE — 99203 OFFICE O/P NEW LOW 30 MIN: CPT | Mod: S$GLB,,, | Performed by: ANESTHESIOLOGY

## 2017-04-28 RX ORDER — HYDROXYCHLOROQUINE SULFATE 200 MG/1
TABLET, FILM COATED ORAL
Qty: 180 TABLET | Refills: 1 | Status: SHIPPED | OUTPATIENT
Start: 2017-04-28 | End: 2017-10-25 | Stop reason: SDUPTHER

## 2017-04-28 NOTE — LETTER
April 28, 2017      Bhargav Lee MD  [address]  [facility] [address]           [facility]  [address]  [facility] [address]  Phone: [phone]  Fax: [phone]          Patient: Selmakirsten Rosado MD Jose J   MR Number: [id number]   YOB: 1935   Date of Visit: 4/28/2017       Dear Dr. Bhargav Lee:    Thank you for referring Selma Lux to me for evaluation. Attached you will find relevant portions of my assessment and plan of care.    If you have questions, please do not hesitate to call me. I look forward to following Selma Lux along with you.    Sincerely,    Salvatore Michele III, MD    Enclosure  CC:  No Recipients    If you would like to receive this communication electronically, please contact [email or web address] or [phone] to request more information on [facility] access.    For providers and/or their staff who would like to refer a patient to [facility], please contact us through our one-stop-shop provider referral line, [facility], at [phone].    If you feel you have received this communication in error or would no longer like to receive these types of communications, please e-mail [email or web address]

## 2017-04-28 NOTE — PROGRESS NOTES
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 She reports having chronic low back and shoulder problems over the past year although her symptoms have been improving. She does not complain of any pain at all at this time. She reports her pain has been well controlled over the past 6 weeks. She was recently evaluated by spine surgery for lumbar radiculopathy and L4/5 TFESI was recommended. The patient does not feel she needs lumbar injection at this time. Her only complaint is occasional numbness in the right lower extremity below the knee. She reports participating in PT ([facility] and [facility]) for her shoulder and low back pain which she feels was helping. On review of the notes, she only attended 2 sessions of PT at [facility]. She currently follows Dr. Hutton at [facility] for her shoulder pain. He injected her shoulders which helped. The patient is somewhat of a poor historian.      She reports 3-4 hours of uninterrupted sleep per night.    Patient denies night fever/night sweats, urinary incontinence, bowel incontinence, significant weight loss, and significant motor weakness .    Physical Therapy/Home Exercise: yes, twice a week      Pain Disability Index Review:  Last 3 PDI Scores 4/28/2017   Pain Disability Index (PDI) 0       Pain Medications:  Hydrocodone/Acetaminophen 5/325mg, 1 tablet daily as needed  Celebrex 200mg, 1 capsule daily  Prednisone 10 mg daily     report:  Reviewed    Pain Procedures: NONE    Imaging:     Lumbar MRI (3/13/2017):    Technique:  Multiplanar multisequence MR images of the lumbar spine obtained without contrast.     Comparison: Lumbar spine radiographs 10/24/2016     Findings:    There is S-shaped scoliosis of the thoracolumbar spine, with dextroscoliosis  of the thoracolumbar spine and levoscoliosis of the mid lumbar spine. The vertebral body heights are well maintained, with no fracture.  No marrow signal abnormality suspicious for an infiltrative process.  There is multilevel disc space height loss.     The conus is normal in appearance, and terminates at the L1 level.  There is a 2.2 cm perineural root sleeve cyst at the level of S3.      The adjacent soft tissue structures demonstrate presence of a 3.9 cm cyst within the right kidney.  There multiple uterine fibroids present.      There are findings of multi-level lumbar spondylosis, as below.    T12-L1: Left paracentral disc spur complex and bilateral facet arthropathy, resulting in mild spinal canal stenosis.  No significant neuroforaminal narrowing.    L1-L2: Asymmetric left-sided broad based disc bulge and bilateral facet arthropathy, resulting in encroachment of the left lateral recess.  There is no significant spinal canal stenosis, and severe left-sided neuroforaminal narrowing.    L2-L3: Asymmetric right-sided broad-based disc bulge and facet arthropathy resulting in moderate spinal canal stenosis, and moderate/severe bilateral neuroforaminal narrowing.    L3-L4: Broad-based disc bulge and bilateral facet arthropathy resulting in moderate spinal canal stenosis, and moderate/severe  neuroforaminal narrowing.    L4-L5: Broad-based disc bulge and bilateral facet arthropathy, with ligamentous hypertrophy, resulting in severe spinal canal stenosis and severe right neuroforaminal narrowing.    L5-S1: Broad-based disc bulge and bilateral facet arthropathy, resulting in severe spinal canal stenosis and moderate left-sided neural foraminal narrowing.      Cervical MRI (3/13/2017):    There is mild anterolisthesis C2 on C3 and mild retrolisthesis of C3 on C4.  There is also mild retrolisthesis of C6 on C7.  The spondylolisthesis is likely secondary to ligamentous laxity.  There is straightening of normal cervical  lordosis. Vertebral body heights are well maintained without evidence of fracture or marrow signal abnormality suspicious for an infiltrative process.  There is loss of intervertebral disc space height at C5-6 and C6-7 Visualized posterior fossa, cervical cord, and upper thoracic cord demonstrate normal signal. Surrounding soft tissue structures demonstrate no significant abnormalities.      C2-3:  Posterior disc osteophyte complex and bilateral facet arthropathy.  Effacement of the anterior thecal sac, without flattening of the spinal cord.  No significant neural foraminal narrowing.    C3-4:  Posterior disc osteophyte complex and bilateral facet arthropathy, resulting in effacement of the anterior thecal sac, without flattening of the spinal cord.  There is moderate left-sided neuroforaminal narrowing.      C4-5:  Posterior disc osteophyte complex and bilateral facet arthropathy, resulting in effacement of the anterior thecal sac, without flattening of the spinal cord.  There is no significant neural foraminal narrowing.    C5-6:  Posterior disc osteophyte complex and bilateral facet arthropathy, resulting in effacement of the anterior thecal sac, without flattening of spinal cord.  No significant neural foraminal narrowing.    C6-7:  Posterior disc osteophyte complex and bilateral facet arthropathy, resulting in effacement of the anterior thecal sac, without flattening of the spinal cord.  No significant neural foraminal narrowing.    C7-T1:  No significant spinal canal stenosis.  No significant neural foraminal narrowing.    Past Medical History:   Diagnosis Date    Anemia     Arthritis     Hashimoto's disease     Hypertension     IGT (impaired glucose tolerance) 1/12/2016    Joint pain     Multinodular goiter 1/12/2016     Past Surgical History:   Procedure Laterality Date    CATARACT EXTRACTION      COLONOSCOPY N/A 9/29/2015    Procedure: COLONOSCOPY;  Surgeon: FIDELINA Sanchez MD;  [facility] (4TH F[facility]);  Service: Endoscopy;  Laterality: N/A;    JOINT REPLACEMENT      right knee    KNEE SURGERY Left 12/31/2013    TKR    left parotidectomy      mixed tumor    SALIVARY GLAND SURGERY      TONSILLECTOMY       Social History     Social History    Marital status:      Spouse name: N/A    Number of children: N/A    Years of education: N/A     Occupational History    Not on file.     Social History Main Topics    Smoking status: Never Smoker    Smokeless tobacco: Never Used    Alcohol use No    Drug use: No    Sexual activity: No      Comment: ,  age 52,      Other Topics Concern    Not on file     Social History Narrative     Family History   Problem Relation Age of Onset    Hypertension Mother     Prostate cancer Father      prostate cancer    Breast cancer Maternal Grandmother     Lupus Neg Hx     Psoriasis Neg Hx     Melanoma Neg Hx     Colon cancer Neg Hx        Review of patient's allergies indicates:  No Known Allergies    Current Outpatient Prescriptions   Medication Sig    amlodipine (NORVASC) 5 MG tablet Take 1 tablet (5 mg total) by mouth once daily.    celecoxib (CELEBREX) 200 MG capsule Take 1 capsule (200 mg total) by mouth 2 (two) times daily.    esomeprazole (NEXIUM) 40 MG capsule Take 1 capsule (40 mg total) by mouth once daily. 1 Capsule, Delayed Release(E.C.) Oral Every day    ferrous sulfate 325 (65 FE) MG EC tablet Take 325 mg by mouth 2 (two) times daily.     hydrocodone-acetaminophen 5-325mg (NORCO) 5-325 mg per tablet Take 1 tablet by mouth every 24 hours as needed for Pain.    hydroxychloroquine (PLAQUENIL) 200 mg tablet TAKE 2 TABLETS ONCE DAILY    montelukast (SINGULAIR) 10 mg tablet Take 1 tablet (10 mg total) by mouth every evening.    predniSONE (DELTASONE) 5 MG tablet Take 2 tablets (10 mg total) by mouth once daily.     No current facility-administered medications for this visit.        REVIEW OF SYSTEMS:  GENERAL: No weight loss,  "malaise or fevers.  HEENT: Negative for frequent or significant headaches.  NECK: Negative for lumps or significant neck swelling.  RESPIRATORY: Negative for cough, wheezing or shortness of breath.  CARDIOVASCULAR: Negative for chest pain or palpitations.  GI: No blood in stools or black stools. + constipation  : Negative for urinary tract infections or incontinence.  MUSCULOSKELETAL: See HPI  SKIN: Negative for lesions, rash, and itching.  PSYCH: + sleep disturbance   HEMATOLOGY/LYMPHOLOGY Negative for prolonged bleeding, bruising easily or swollen nodes.  NEURO:  No history of syncope, seizures or tremors.    OBJECTIVE:    /72 (BP Location: Left arm, Patient Position: Sitting, BP Method: Manual)  Pulse 72  Resp 20  Ht 5' 5" (1.651 m)  Wt 74.7 kg (164 lb 10.9 oz)  BMI 27.4 kg/m2    PHYSICAL EXAMINATION:  GENERAL: Well appearing, in no acute distress.  PSYCH:  Mood and affect is appropriate.  Awake, alert, and oriented x 3.  SKIN: Skin color, texture, turgor normal, no rashes or lesions  HEENT: Normocephalic, atraumatic.  EOM intact.  CV: Radial pulses are 2+.  RESP:  Respirations are unlabored.  GI: Abdomen soft and non-tender.  MSK:  No atrophy or tone abnormalities are noted.      Neck: No pain to palpation over the cervical paraspinous muscles. Pain with lateral rotation.  No obvious deformity or signs of trauma.  Normal cervical spine range of motion.    Back: Straight leg raising in the sitting position is negative for radicular pain. Mild tenderness to palpation over the lumbar spine and paraspinous muscles.  Negative for pain with facet loading and back extension/rotation.     Buttocks:  No pain to palpation over the PSIS.    Extremities:  No edema or skin discolorations noted.     Gait: Antalgic, uses cane.    NEUR:  Strength testing is 5/5 throughout all muscle groups in the lower extremities. No loss of sensation is noted.     ASSESSMENT:     1. Lumbar radiculopathy    2. Lumbar stenosis  "   3. Chronic pain of both shoulders        PLAN:     - I have stressed the importance of physical activity and a home exercise plan to help with pain and improve health.  - Will plan for lumbar TESI in the future as needed. Patient will contact office if she elects to proceed.  - Counseled patient regarding the importance of physical therapy.  - RTC as needed.    The above plan and management options were discussed at length with patient. Patient is in agreement with the above and verbalized understanding. It will be communicated with the referring physician via electronic record, fax, or mail.    Salvatore Michele III  04/28/2017

## 2017-05-01 RX ORDER — MELOXICAM 15 MG/1
TABLET ORAL
Qty: 90 TABLET | Refills: 0 | OUTPATIENT
Start: 2017-05-01

## 2017-05-10 ENCOUNTER — LAB VISIT (OUTPATIENT)
Dept: LAB | Facility: HOSPITAL | Age: 82
End: 2017-05-10
Attending: FAMILY MEDICINE
Payer: MEDICARE

## 2017-05-10 DIAGNOSIS — E78.5 HYPERLIPIDEMIA, UNSPECIFIED HYPERLIPIDEMIA TYPE: ICD-10-CM

## 2017-05-10 DIAGNOSIS — E06.3 HASHIMOTO'S DISEASE: ICD-10-CM

## 2017-05-10 DIAGNOSIS — I10 HYPERTENSION, ESSENTIAL: ICD-10-CM

## 2017-05-10 LAB
ALBUMIN SERPL BCP-MCNC: 3.6 G/DL
ALP SERPL-CCNC: 75 U/L
ALT SERPL W/O P-5'-P-CCNC: 7 U/L
ANION GAP SERPL CALC-SCNC: 9 MMOL/L
AST SERPL-CCNC: 9 U/L
BILIRUB SERPL-MCNC: 0.7 MG/DL
BUN SERPL-MCNC: 17 MG/DL
CALCIUM SERPL-MCNC: 9.6 MG/DL
CHLORIDE SERPL-SCNC: 101 MMOL/L
CHOLEST/HDLC SERPL: 2.2 {RATIO}
CO2 SERPL-SCNC: 31 MMOL/L
CREAT SERPL-MCNC: 0.9 MG/DL
EST. GFR  (AFRICAN AMERICAN): >60 ML/MIN/1.73 M^2
EST. GFR  (NON AFRICAN AMERICAN): >60 ML/MIN/1.73 M^2
GLUCOSE SERPL-MCNC: 76 MG/DL
HDL/CHOLESTEROL RATIO: 45.8 %
HDLC SERPL-MCNC: 166 MG/DL
HDLC SERPL-MCNC: 76 MG/DL
LDLC SERPL CALC-MCNC: 77.8 MG/DL
NONHDLC SERPL-MCNC: 90 MG/DL
POTASSIUM SERPL-SCNC: 4.2 MMOL/L
PROT SERPL-MCNC: 7.5 G/DL
SODIUM SERPL-SCNC: 141 MMOL/L
TRIGL SERPL-MCNC: 61 MG/DL
TSH SERPL DL<=0.005 MIU/L-ACNC: 1.51 UIU/ML

## 2017-05-10 PROCEDURE — 80053 COMPREHEN METABOLIC PANEL: CPT

## 2017-05-10 PROCEDURE — 80061 LIPID PANEL: CPT

## 2017-05-10 PROCEDURE — 36415 COLL VENOUS BLD VENIPUNCTURE: CPT

## 2017-05-10 PROCEDURE — 84443 ASSAY THYROID STIM HORMONE: CPT

## 2017-05-15 ENCOUNTER — OFFICE VISIT (OUTPATIENT)
Dept: INTERNAL MEDICINE | Facility: CLINIC | Age: 82
End: 2017-05-15
Payer: MEDICARE

## 2017-05-15 VITALS
HEART RATE: 77 BPM | WEIGHT: 167.13 LBS | BODY MASS INDEX: 27.85 KG/M2 | HEIGHT: 65 IN | OXYGEN SATURATION: 97 % | DIASTOLIC BLOOD PRESSURE: 50 MMHG | SYSTOLIC BLOOD PRESSURE: 120 MMHG

## 2017-05-15 DIAGNOSIS — M47.812 OSTEOARTHRITIS OF CERVICAL SPINE, UNSPECIFIED SPINAL OSTEOARTHRITIS COMPLICATION STATUS: ICD-10-CM

## 2017-05-15 DIAGNOSIS — M05.79 SEROPOSITIVE RHEUMATOID ARTHRITIS OF MULTIPLE SITES: ICD-10-CM

## 2017-05-15 DIAGNOSIS — M79.605 PAIN IN BOTH LOWER EXTREMITIES: Primary | ICD-10-CM

## 2017-05-15 DIAGNOSIS — N39.0 URINARY TRACT INFECTION WITHOUT HEMATURIA, SITE UNSPECIFIED: ICD-10-CM

## 2017-05-15 DIAGNOSIS — S46.009D ROTATOR CUFF INJURY, UNSPECIFIED LATERALITY, SUBSEQUENT ENCOUNTER: ICD-10-CM

## 2017-05-15 DIAGNOSIS — M47.816 OSTEOARTHRITIS OF LUMBAR SPINE, UNSPECIFIED SPINAL OSTEOARTHRITIS COMPLICATION STATUS: ICD-10-CM

## 2017-05-15 DIAGNOSIS — M79.604 PAIN IN BOTH LOWER EXTREMITIES: Primary | ICD-10-CM

## 2017-05-15 DIAGNOSIS — I10 HYPERTENSION, ESSENTIAL: ICD-10-CM

## 2017-05-15 PROCEDURE — 87086 URINE CULTURE/COLONY COUNT: CPT

## 2017-05-15 PROCEDURE — 99214 OFFICE O/P EST MOD 30 MIN: CPT | Mod: S$PBB,,, | Performed by: FAMILY MEDICINE

## 2017-05-15 PROCEDURE — 99213 OFFICE O/P EST LOW 20 MIN: CPT | Mod: PBBFAC | Performed by: FAMILY MEDICINE

## 2017-05-15 PROCEDURE — 99999 PR PBB SHADOW E&M-EST. PATIENT-LVL III: CPT | Mod: PBBFAC,,, | Performed by: FAMILY MEDICINE

## 2017-05-15 NOTE — PROGRESS NOTES
Subjective:       Patient ID: Selma Rosado MD Jose J is a 81 y.o. female.    Chief Complaint: Back Pain    HPI  Review of Systems   Constitutional: Positive for fatigue. Negative for chills and fever.   HENT: Negative for congestion and trouble swallowing.    Eyes: Negative for redness.   Respiratory: Negative for cough, chest tightness and shortness of breath.    Cardiovascular: Negative for chest pain, palpitations and leg swelling.   Gastrointestinal: Negative for abdominal pain and blood in stool.   Genitourinary: Negative for hematuria and menstrual problem.   Musculoskeletal: Positive for arthralgias, gait problem and myalgias. Negative for back pain, joint swelling and neck pain.   Skin: Negative for color change and rash.   Neurological: Negative for tremors, speech difficulty, weakness, numbness and headaches.   Hematological: Negative for adenopathy. Does not bruise/bleed easily.   Psychiatric/Behavioral: Negative for behavioral problems, confusion and sleep disturbance. The patient is not nervous/anxious.        Objective:      Physical Exam   Constitutional: She is oriented to person, place, and time. She appears well-developed and well-nourished. No distress.   Neck: Neck supple.   Pulmonary/Chest: Effort normal.   Musculoskeletal: She exhibits no edema.        Right lower leg: She exhibits no edema.        Left lower leg: She exhibits no edema.   Neurological: She is alert and oriented to person, place, and time.   Skin: Skin is warm and dry. No rash noted.   Psychiatric: She has a normal mood and affect. Her behavior is normal. Judgment and thought content normal.   Nursing note and vitals reviewed.      Assessment:       1. Pain in both lower extremities    2. Hypertension, essential    3. Seropositive rheumatoid arthritis of multiple sites    4. Rotator cuff injury, unspecified laterality, subsequent encounter    5. Osteoarthritis of lumbar spine, unspecified spinal osteoarthritis complication  status    6. Osteoarthritis of cervical spine, unspecified spinal osteoarthritis complication status    7. Urinary tract infection without hematuria, site unspecified        Plan:   Selma was seen today for back pain.    Diagnoses and all orders for this visit:    Pain in both lower extremities    Hypertension, essential    Seropositive rheumatoid arthritis of multiple sites    Rotator cuff injury, unspecified laterality, subsequent encounter    Osteoarthritis of lumbar spine, unspecified spinal osteoarthritis complication status    Osteoarthritis of cervical spine, unspecified spinal osteoarthritis complication status    Urinary tract infection without hematuria, site unspecified  -     Urine culture      See meds, orders, follow up, routing and instructions sections of encounter.  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  She is having pain primarily in the right foot at   this time.  She has seen multiple consultants including Rheumatology, our Spine   Service and Dr. Skelton.    She has had a mild weight gain since her last appointment of approximately 7   pounds.  She has a history of RA and OA.  She is currently Plaquenil and   Celebrex.    Discussed her activity level, currently not exercising much.  I did encourage   some aerobic and low impact participation.    RECOMMENDATIONS:  See meds and orders, basically status quo at this time.  Her   laboratory was reviewed.  Continue her current medications and follow up with me   in approximately four months.      SELENA/HN  dd: 05/15/2017 12:15:05 (CDT)  td: 05/16/2017 06:00:14 (CDT)  Doc ID   #[id number]  Job ID #[id number]    CC:

## 2017-05-15 NOTE — MR AVS SNAPSHOT
[facility]  [address]  [facility] [address]  Phone: [phone]  Fax: [phone]                  Selma Lux MD   5/15/2017 10:40 AM   Office Visit    Description:  Female : 1935   Provider:  Bhargav Lee MD   Department:  [facility]           Reason for Visit     Back Pain           Diagnoses this Visit        Comments    Pain in both lower extremities    -  Primary     Hypertension, essential         Seropositive rheumatoid arthritis of multiple sites         Rotator cuff injury, unspecified laterality, subsequent encounter         Osteoarthritis of lumbar spine, unspecified spinal osteoarthritis complication status         Osteoarthritis of cervical spine, unspecified spinal osteoarthritis complication status         Urinary tract infection without hematuria, site unspecified                To Do List           Future Appointments        Provider Department Dept Phone    6/15/2017 10:00 AM LAB, APPOINTMENT [place] [facility] [phone]    2017 2:20 PM Chu Qureshi MD [facility] Rhe[facility]ology [phone]      Goals (5 Years of Data)     None      Follow-Up and Disposition     Return in about 4 months (around 9/15/2017) for to reassess treatment for condition or medication.      [facility] On Call     [facility] On Call Nurse Care Line -  Assistance  Unless otherwise directed by your provider, please contact [facility], our nurse care line that is available for  assistance.     Registered nurses in the [facility] provide: appointment scheduling, clinical advisement, health education, and other advisory services.  Call: [phone] (toll free)               Medications           Message regarding Medications     Verify the changes and/or additions to your medication regime listed below are the same as discussed with your clinician today.  If any of these changes or additions are  "incorrect, please notify your healthcare provider.             Verify that the below list of medications is an accurate representation of the medications you are currently taking.  If none reported, the list may be blank. If incorrect, please contact your healthcare provider. Carry this list with you in case of emergency.           Current Medications     amlodipine (NORVASC) 5 MG tablet Take 1 tablet (5 mg total) by mouth once daily.    celecoxib (CELEBREX) 200 MG capsule Take 1 capsule (200 mg total) by mouth 2 (two) times daily.    esomeprazole (NEXIUM) 40 MG capsule Take 1 capsule (40 mg total) by mouth once daily. 1 Capsule, Delayed Release(E.C.) Oral Every day    ferrous sulfate 325 (65 FE) MG EC tablet Take 325 mg by mouth 2 (two) times daily.     hydrocodone-acetaminophen 5-325mg (NORCO) 5-325 mg per tablet Take 1 tablet by mouth every 24 hours as needed for Pain.    hydroxychloroquine (PLAQUENIL) 200 mg tablet TAKE 2 TABLETS ONCE DAILY    montelukast (SINGULAIR) 10 mg tablet Take 1 tablet (10 mg total) by mouth every evening.    predniSONE (DELTASONE) 5 MG tablet Take 2 tablets (10 mg total) by mouth once daily.           Clinical Reference Information           Your Vitals Were     BP Pulse Height Weight SpO2 BMI    120/50 77 5' 5" (1.651 m) 75.8 kg (167 lb 1.7 oz) 97% 27.81 kg/m2      Blood Pressure          Most Recent Value    BP  (!)  120/50      Allergies as of 5/15/2017     No Known Allergies      Immunizations Administered on Date of Encounter - 5/15/2017     None      Orders Placed During Today's Visit      Normal Orders This Visit    Urine culture       Language Assistance Services     ATTENTION: Language assistance services are available, free of charge. Please call [phone].      ATENCIÓN: Si habla zuly, tiene a lucio disposición servicios gratuitos de asistencia lingüística. Llame al [phone].     BENI Ý: N?u b?n nói Ti?ng Vi?t, có các d?ch v? h? tr? ngôn ng? mi?n phí dành cho b?n. " G?i s? [phone].         [facility] complies with applicable Federal civil rights laws and does not discriminate on the basis of race, color, national origin, age, disability, or sex.

## 2017-05-17 LAB — BACTERIA UR CULT: NORMAL

## 2017-05-25 DIAGNOSIS — K29.70 GASTRITIS: ICD-10-CM

## 2017-05-25 RX ORDER — ESOMEPRAZOLE MAGNESIUM 40 MG/1
CAPSULE, DELAYED RELEASE ORAL
Qty: 90 CAPSULE | Refills: 2 | Status: ON HOLD | OUTPATIENT
Start: 2017-05-25 | End: 2017-09-20 | Stop reason: CLARIF

## 2017-05-25 RX ORDER — HYDROCHLOROTHIAZIDE 12.5 MG/1
TABLET ORAL
Qty: 90 TABLET | Refills: 2 | Status: SHIPPED | OUTPATIENT
Start: 2017-05-25 | End: 2017-08-21

## 2017-06-09 ENCOUNTER — TELEPHONE (OUTPATIENT)
Dept: SURGERY | Facility: CLINIC | Age: 82
End: 2017-06-09

## 2017-06-09 NOTE — TELEPHONE ENCOUNTER
----- Message from Eda Gaston sent at 6/9/2017  9:53 AM CDT -----  Contact: Pt can be reached [phone]  Pt is calling to request to be seen soon as possible. Pt is having spasm in bowel, pain in lower bowel.  Please contact pt.

## 2017-06-15 ENCOUNTER — LAB VISIT (OUTPATIENT)
Dept: LAB | Facility: HOSPITAL | Age: 82
End: 2017-06-15
Attending: INTERNAL MEDICINE
Payer: MEDICARE

## 2017-06-15 DIAGNOSIS — M05.79 SEROPOSITIVE RHEUMATOID ARTHRITIS OF MULTIPLE SITES: ICD-10-CM

## 2017-06-15 LAB
CRP SERPL-MCNC: 38.7 MG/L
ERYTHROCYTE [SEDIMENTATION RATE] IN BLOOD BY WESTERGREN METHOD: 45 MM/HR

## 2017-06-15 PROCEDURE — 85651 RBC SED RATE NONAUTOMATED: CPT

## 2017-06-15 PROCEDURE — 86140 C-REACTIVE PROTEIN: CPT

## 2017-06-15 PROCEDURE — 36415 COLL VENOUS BLD VENIPUNCTURE: CPT

## 2017-06-20 ENCOUNTER — TELEPHONE (OUTPATIENT)
Dept: SURGERY | Facility: CLINIC | Age: 82
End: 2017-06-20

## 2017-06-20 NOTE — TELEPHONE ENCOUNTER
----- Message from Frances Rubio sent at 6/19/2017 11:01 AM CDT -----  Contact: Self   Pt would like a call back in regards to rescheduling her upcoming appt due to extreme discomfort       Pt can be contacted at [phone]

## 2017-06-21 ENCOUNTER — OFFICE VISIT (OUTPATIENT)
Dept: SURGERY | Facility: CLINIC | Age: 82
End: 2017-06-21
Payer: MEDICARE

## 2017-06-21 VITALS
HEART RATE: 86 BPM | BODY MASS INDEX: 27.32 KG/M2 | WEIGHT: 164 LBS | SYSTOLIC BLOOD PRESSURE: 133 MMHG | DIASTOLIC BLOOD PRESSURE: 68 MMHG | HEIGHT: 65 IN

## 2017-06-21 DIAGNOSIS — K58.9 IRRITABLE BOWEL SYNDROME WITHOUT DIARRHEA: ICD-10-CM

## 2017-06-21 PROCEDURE — 1126F AMNT PAIN NOTED NONE PRSNT: CPT | Mod: ,,, | Performed by: COLON & RECTAL SURGERY

## 2017-06-21 PROCEDURE — 99214 OFFICE O/P EST MOD 30 MIN: CPT | Mod: PBBFAC | Performed by: COLON & RECTAL SURGERY

## 2017-06-21 PROCEDURE — 99999 PR PBB SHADOW E&M-EST. PATIENT-LVL IV: CPT | Mod: PBBFAC,,, | Performed by: COLON & RECTAL SURGERY

## 2017-06-21 PROCEDURE — 1159F MED LIST DOCD IN RCRD: CPT | Mod: ,,, | Performed by: COLON & RECTAL SURGERY

## 2017-06-21 PROCEDURE — 99214 OFFICE O/P EST MOD 30 MIN: CPT | Mod: S$PBB,,, | Performed by: COLON & RECTAL SURGERY

## 2017-06-21 RX ORDER — HYOSCYAMINE SULFATE 0.12 MG/1
0.12 TABLET, ORALLY DISINTEGRATING ORAL EVERY 4 HOURS PRN
Qty: 60 TABLET | Refills: 3 | Status: SHIPPED | OUTPATIENT
Start: 2017-06-21 | End: 2017-09-25

## 2017-06-21 NOTE — PROGRESS NOTES
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 Cram;py pain associated with blosting and frequent stools. Tried Miralax without improement.    Hx of several months of painless bright red blood per rectum. c-scope 9/25/15 for hx of polyps had small polyp (Adenoma) by Daniel    ROS:        Constitutional: No fever, chills, activity or appetite change.      HENT: No hearing loss, facial swelling, neck pain or stiffness.       Eyes: No discharge, itching and visual disturbance.      Respiratory: No apnea, cough, choking or shortness of breath.       Cardiovascular: No leg swelling or chest pain      Gastrointestinal: No abdominal distention or change in bowel habbits     Genitourinary: No dysuria, frequency or flank pain.      Musculoskeletal: No arthralgias or gait problem.      Neurological: No dizziness, seizures or weakness.      Hematological: No adenopathy.      Psychiatric/Behavioral: No hallucinations or behavioral problems.       PE:    APPEARANCE: Well nourished, well developed, in no acute distress.   CHEST: Lungs clear. Normal respiratory effort.  CARDIOVASCULAR: Normal rhythm. No edema.  ABDOMEN: Soft. No tenderness or masses.  Rectum:  Normal skin,  Rectal defererred per patient    Musculoskeletal: Symmetric, normal range of motion and strength.   Neurological: Alert and oriented to person, place, and time. Normal reflexes.   Skin: Skin is warm and dry.   Psychiatric: Normal mood and affect. Behavior is normal and appropriate.     Impression: IBS  PLANl Trial fo fiber and Nulev

## 2017-06-21 NOTE — PATIENT INSTRUCTIONS
Fiber on a daily basis 2-3 capsules in morning (Metamuci, citrucil or Gummy Fiber) by mouth    Nulev 1 pill under tongue for cramps

## 2017-06-22 ENCOUNTER — OFFICE VISIT (OUTPATIENT)
Dept: RHEUMATOLOGY | Facility: CLINIC | Age: 82
End: 2017-06-22
Payer: MEDICARE

## 2017-06-22 VITALS
WEIGHT: 164.13 LBS | SYSTOLIC BLOOD PRESSURE: 115 MMHG | HEART RATE: 82 BPM | BODY MASS INDEX: 27.35 KG/M2 | HEIGHT: 65 IN | DIASTOLIC BLOOD PRESSURE: 70 MMHG

## 2017-06-22 DIAGNOSIS — M15.9 PRIMARY OSTEOARTHRITIS INVOLVING MULTIPLE JOINTS: ICD-10-CM

## 2017-06-22 DIAGNOSIS — Z79.52 LONG TERM (CURRENT) USE OF SYSTEMIC STEROIDS: ICD-10-CM

## 2017-06-22 DIAGNOSIS — M05.79 SEROPOSITIVE RHEUMATOID ARTHRITIS OF MULTIPLE SITES: Primary | ICD-10-CM

## 2017-06-22 PROCEDURE — 99999 PR PBB SHADOW E&M-EST. PATIENT-LVL III: CPT | Mod: PBBFAC,,, | Performed by: INTERNAL MEDICINE

## 2017-06-22 PROCEDURE — 99213 OFFICE O/P EST LOW 20 MIN: CPT | Mod: PBBFAC | Performed by: INTERNAL MEDICINE

## 2017-06-22 PROCEDURE — 1159F MED LIST DOCD IN RCRD: CPT | Mod: ,,, | Performed by: INTERNAL MEDICINE

## 2017-06-22 PROCEDURE — 1125F AMNT PAIN NOTED PAIN PRSNT: CPT | Mod: ,,, | Performed by: INTERNAL MEDICINE

## 2017-06-22 PROCEDURE — 99213 OFFICE O/P EST LOW 20 MIN: CPT | Mod: S$PBB,,, | Performed by: INTERNAL MEDICINE

## 2017-06-22 NOTE — PROGRESS NOTES
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  She was able to cut her prednisone back to 5 mg daily.  She has noticed no difference with cutting the prednisone.  She is still having shoulder problems and had an injection recently.  This did not give her much relief.  She has also had pain in the lower back.  She was seen by pain management but did not have an injection.  She remains on Celebrex for the pain.  She was seen by Dr. back for irritable bowel syndrome and was placed on new medications.    Physical examination:  Musculoskeletal: She has good range of motion of all joints except the shoulders..  She has no synovitis.  She has tenderness of the left knee.  Laboratory: Inflammatory markers have improved but they're still elevated    Assessment: Stable rheumatoid arthritis    Plans: Continue medications as before.  Return to see me in 4 months.

## 2017-06-28 ENCOUNTER — OFFICE VISIT (OUTPATIENT)
Dept: SURGERY | Facility: CLINIC | Age: 82
End: 2017-06-28
Payer: MEDICARE

## 2017-06-28 VITALS
DIASTOLIC BLOOD PRESSURE: 74 MMHG | WEIGHT: 162.06 LBS | SYSTOLIC BLOOD PRESSURE: 136 MMHG | HEIGHT: 65 IN | HEART RATE: 79 BPM | BODY MASS INDEX: 27 KG/M2

## 2017-06-28 DIAGNOSIS — K58.2 IRRITABLE BOWEL SYNDROME WITH BOTH CONSTIPATION AND DIARRHEA: Primary | ICD-10-CM

## 2017-06-28 PROCEDURE — 1126F AMNT PAIN NOTED NONE PRSNT: CPT | Mod: ,,, | Performed by: COLON & RECTAL SURGERY

## 2017-06-28 PROCEDURE — 99214 OFFICE O/P EST MOD 30 MIN: CPT | Mod: S$PBB,,, | Performed by: COLON & RECTAL SURGERY

## 2017-06-28 PROCEDURE — 99213 OFFICE O/P EST LOW 20 MIN: CPT | Mod: PBBFAC | Performed by: COLON & RECTAL SURGERY

## 2017-06-28 PROCEDURE — 1159F MED LIST DOCD IN RCRD: CPT | Mod: ,,, | Performed by: COLON & RECTAL SURGERY

## 2017-06-28 PROCEDURE — 99999 PR PBB SHADOW E&M-EST. PATIENT-LVL III: CPT | Mod: PBBFAC,,, | Performed by: COLON & RECTAL SURGERY

## 2017-06-28 NOTE — PROGRESS NOTES
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   Seen by Savita in March for abdominal pain (labs normal except for elevated CRP and sed rate). Crampy pain associated with bloating and frequent stools. Tried Miralax without improvement.    Hx of several months of painless bright red blood per rectum. c-scope 9/25/15 for hx of polyps had small polyp (Adenoma) by Daniel    ROS:        Constitutional: No fever, chills, activity or appetite change.      HENT: No hearing loss, facial swelling, neck pain or stiffness.       Eyes: No discharge, itching and visual disturbance.      Respiratory: No apnea, cough, choking or shortness of breath.       Cardiovascular: No leg swelling or chest pain      Gastrointestinal: No abdominal distention or change in bowel habbits     Genitourinary: No dysuria, frequency or flank pain.      Musculoskeletal: No arthralgias or gait problem.      Neurological: No dizziness, seizures or weakness.      Hematological: No adenopathy.      Psychiatric/Behavioral: No hallucinations or behavioral problems.       PE:    APPEARANCE: Well nourished, well developed, in no acute distress.   CHEST: Lungs clear. Normal respiratory effort.  CARDIOVASCULAR: Normal rhythm. No edema.  ABDOMEN: Soft. No tenderness or masses.  Rectum:  Normal skin,  Rectal defererred per patient    Musculoskeletal: Symmetric, normal range of motion and strength.   Neurological: Alert and oriented to person, place, and time. Normal reflexes.   Skin: Skin is warm and dry.   Psychiatric: Normal mood and affect. Behavior is normal and appropriate.     Impression: probable IBS  PLAN: ME enterography. Rtc after test.

## 2017-07-12 ENCOUNTER — HOSPITAL ENCOUNTER (OUTPATIENT)
Dept: RADIOLOGY | Facility: HOSPITAL | Age: 82
Discharge: HOME | End: 2017-07-12
Attending: COLON & RECTAL SURGERY
Payer: MEDICARE

## 2017-07-12 DIAGNOSIS — K58.2 IRRITABLE BOWEL SYNDROME WITH BOTH CONSTIPATION AND DIARRHEA: ICD-10-CM

## 2017-07-12 PROCEDURE — 74183 MRI ABD W/O CNTR FLWD CNTR: CPT | Mod: TC

## 2017-07-12 PROCEDURE — 74183 MRI ABD W/O CNTR FLWD CNTR: CPT | Mod: 26,GC,, | Performed by: RADIOLOGY

## 2017-07-12 PROCEDURE — 72197 MRI PELVIS W/O & W/DYE: CPT | Mod: 26,,, | Performed by: RADIOLOGY

## 2017-07-12 PROCEDURE — 25500020 PHARM REV CODE 255: Performed by: COLON & RECTAL SURGERY

## 2017-07-12 PROCEDURE — A9585 GADOBUTROL INJECTION: HCPCS | Performed by: COLON & RECTAL SURGERY

## 2017-07-12 PROCEDURE — 72197 MRI PELVIS W/O & W/DYE: CPT | Mod: TC

## 2017-07-12 RX ORDER — GADOBUTROL 604.72 MG/ML
10 INJECTION INTRAVENOUS
Status: COMPLETED | OUTPATIENT
Start: 2017-07-12 | End: 2017-07-12

## 2017-07-12 RX ADMIN — GADOBUTROL 10 ML: 604.72 INJECTION INTRAVENOUS at 05:07

## 2017-07-24 DIAGNOSIS — I10 ESSENTIAL HYPERTENSION: ICD-10-CM

## 2017-07-24 RX ORDER — AMLODIPINE BESYLATE 5 MG/1
TABLET ORAL
Qty: 90 TABLET | Refills: 2 | Status: ON HOLD | OUTPATIENT
Start: 2017-07-24 | End: 2018-05-01

## 2017-07-28 ENCOUNTER — OFFICE VISIT (OUTPATIENT)
Dept: INTERNAL MEDICINE | Facility: CLINIC | Age: 82
End: 2017-07-28
Attending: FAMILY MEDICINE
Payer: MEDICARE

## 2017-07-28 ENCOUNTER — LAB VISIT (OUTPATIENT)
Dept: LAB | Facility: HOSPITAL | Age: 82
End: 2017-07-28
Attending: INTERNAL MEDICINE
Payer: MEDICARE

## 2017-07-28 ENCOUNTER — TELEPHONE (OUTPATIENT)
Dept: INTERNAL MEDICINE | Facility: CLINIC | Age: 82
End: 2017-07-28

## 2017-07-28 VITALS
HEIGHT: 65 IN | SYSTOLIC BLOOD PRESSURE: 108 MMHG | DIASTOLIC BLOOD PRESSURE: 58 MMHG | OXYGEN SATURATION: 97 % | HEART RATE: 83 BPM | WEIGHT: 162.38 LBS | BODY MASS INDEX: 27.05 KG/M2

## 2017-07-28 DIAGNOSIS — M05.79 SEROPOSITIVE RHEUMATOID ARTHRITIS OF MULTIPLE SITES: Primary | ICD-10-CM

## 2017-07-28 DIAGNOSIS — D50.9 IRON DEFICIENCY ANEMIA, UNSPECIFIED IRON DEFICIENCY ANEMIA TYPE: ICD-10-CM

## 2017-07-28 DIAGNOSIS — M15.9 PRIMARY OSTEOARTHRITIS INVOLVING MULTIPLE JOINTS: ICD-10-CM

## 2017-07-28 DIAGNOSIS — I10 HYPERTENSION, ESSENTIAL: ICD-10-CM

## 2017-07-28 DIAGNOSIS — R26.9 ABNORMALITY OF GAIT AND MOBILITY: ICD-10-CM

## 2017-07-28 DIAGNOSIS — D50.0 IRON DEFICIENCY ANEMIA DUE TO CHRONIC BLOOD LOSS: ICD-10-CM

## 2017-07-28 DIAGNOSIS — D25.9 UTERINE LEIOMYOMA, UNSPECIFIED LOCATION: ICD-10-CM

## 2017-07-28 PROBLEM — M25.512 CHRONIC PAIN OF BOTH SHOULDERS: Status: RESOLVED | Noted: 2017-03-23 | Resolved: 2017-07-28

## 2017-07-28 PROBLEM — M25.511 CHRONIC PAIN OF BOTH SHOULDERS: Status: RESOLVED | Noted: 2017-03-23 | Resolved: 2017-07-28

## 2017-07-28 PROBLEM — G89.29 CHRONIC PAIN OF BOTH SHOULDERS: Status: RESOLVED | Noted: 2017-03-23 | Resolved: 2017-07-28

## 2017-07-28 LAB
BASOPHILS # BLD AUTO: 0.03 K/UL
BASOPHILS NFR BLD: 0.4 %
DIFFERENTIAL METHOD: ABNORMAL
EOSINOPHIL # BLD AUTO: 0.1 K/UL
EOSINOPHIL NFR BLD: 1.5 %
ERYTHROCYTE [DISTWIDTH] IN BLOOD BY AUTOMATED COUNT: 14.8 %
FERRITIN SERPL-MCNC: 602 NG/ML
HCT VFR BLD AUTO: 40.7 %
HGB BLD-MCNC: 12.6 G/DL
LYMPHOCYTES # BLD AUTO: 1.1 K/UL
LYMPHOCYTES NFR BLD: 13 %
MCH RBC QN AUTO: 27 PG
MCHC RBC AUTO-ENTMCNC: 31 G/DL
MCV RBC AUTO: 87 FL
MONOCYTES # BLD AUTO: 1.6 K/UL
MONOCYTES NFR BLD: 18.2 %
NEUTROPHILS # BLD AUTO: 5.7 K/UL
NEUTROPHILS NFR BLD: 66.8 %
PLATELET # BLD AUTO: 264 K/UL
PMV BLD AUTO: 9.8 FL
RBC # BLD AUTO: 4.66 M/UL
WBC # BLD AUTO: 8.51 K/UL

## 2017-07-28 PROCEDURE — 85025 COMPLETE CBC W/AUTO DIFF WBC: CPT

## 2017-07-28 PROCEDURE — 36415 COLL VENOUS BLD VENIPUNCTURE: CPT

## 2017-07-28 PROCEDURE — 1125F AMNT PAIN NOTED PAIN PRSNT: CPT | Mod: ,,, | Performed by: FAMILY MEDICINE

## 2017-07-28 PROCEDURE — 82728 ASSAY OF FERRITIN: CPT

## 2017-07-28 PROCEDURE — 1159F MED LIST DOCD IN RCRD: CPT | Mod: ,,, | Performed by: FAMILY MEDICINE

## 2017-07-28 PROCEDURE — 99999 PR PBB SHADOW E&M-EST. PATIENT-LVL IV: CPT | Mod: PBBFAC,,, | Performed by: FAMILY MEDICINE

## 2017-07-28 PROCEDURE — 99214 OFFICE O/P EST MOD 30 MIN: CPT | Mod: S$PBB,,, | Performed by: FAMILY MEDICINE

## 2017-07-28 RX ORDER — HYDROCODONE BITARTRATE AND ACETAMINOPHEN 5; 325 MG/1; MG/1
1 TABLET ORAL
Qty: 30 TABLET | Refills: 0 | Status: SHIPPED | OUTPATIENT
Start: 2017-07-28 | End: 2017-08-25 | Stop reason: SDUPTHER

## 2017-07-28 NOTE — PROGRESS NOTES
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    Chief Complaint: Hip Pain (Right hip )    HPI  Review of Systems   Constitutional: Positive for fatigue. Negative for chills and fever.   HENT: Negative for congestion and trouble swallowing.    Eyes: Negative for redness.   Respiratory: Negative for cough, chest tightness and shortness of breath.    Cardiovascular: Negative for chest pain, palpitations and leg swelling.   Gastrointestinal: Positive for abdominal distention and abdominal pain. Negative for blood in stool.   Genitourinary: Negative for hematuria and menstrual problem.   Musculoskeletal: Positive for arthralgias, gait problem, joint swelling and myalgias. Negative for back pain and neck pain.   Skin: Negative for color change and rash.   Neurological: Positive for tremors and weakness. Negative for speech difficulty, numbness and headaches.   Hematological: Negative for adenopathy. Does not bruise/bleed easily.   Psychiatric/Behavioral: Negative for behavioral problems, confusion and sleep disturbance. The patient is not nervous/anxious.        Objective:      Physical Exam   Constitutional: She is oriented to person, place, and time. She appears well-developed and well-nourished. No distress.   Neck: Neck supple.   Pulmonary/Chest: Effort normal.   Abdominal: She exhibits no distension.   Musculoskeletal: She exhibits no edema.        Right lower leg: She exhibits no edema.        Left lower leg: She exhibits no edema.   Neurological: She is alert and oriented to person, place, and time. She displays tremor. No cranial nerve deficit or sensory deficit. Coordination and gait abnormal. GCS eye subscore is 4. GCS verbal subscore is 5. GCS motor subscore is 6.   Reflex Scores:       Tricep reflexes are 1+ on the right side and 1+ on the left side.       Bicep reflexes are 1+ on the right side and 1+ on the left side.       Patellar reflexes are 1+ on the right side and 3+ on  the left side.       Achilles reflexes are 1+ on the right side and 1+ on the left side.  Skin: Skin is warm and dry. No rash noted.   Psychiatric: She has a normal mood and affect. Her behavior is normal. Judgment and thought content normal.   Nursing note and vitals reviewed.      Assessment:       1. Seropositive rheumatoid arthritis of multiple sites    2. Primary osteoarthritis involving multiple joints    3. Hypertension, essential    4. Abnormality of gait and mobility    5. Uterine leiomyoma, unspecified location    6. Iron deficiency anemia, unspecified iron deficiency anemia type        Plan:   Selma was seen today for hip pain.    Diagnoses and all orders for this visit:    Seropositive rheumatoid arthritis of multiple sites    Primary osteoarthritis involving multiple joints    Hypertension, essential    Abnormality of gait and mobility  -     Ambulatory referral to Neurology    Uterine leiomyoma, unspecified location  -     Ambulatory referral to Obstetrics / Gynecology    Iron deficiency anemia, unspecified iron deficiency anemia type  -     Ambulatory referral to Hematology / Oncology    Other orders  -     hydrocodone-acetaminophen 5-325mg (NORCO) 5-325 mg per tablet; Take 1 tablet by mouth every 24 hours as needed for Pain.      See meds, orders, follow up, routing and instructions sections of encounter.  The patient is in for an early followup.  She is complaining of pain, stiffness,   and difficulty walking in the morning.  She has trochanteric or SI hip   discomfort.    I noted today that the patient had some involuntary appearing movements with the   left leg, left arm and less so to the right.  She had a challenge gait with   possible foot drop.  She had what appeared to be a hyperreflexia to the left   leg.  She has some swelling to her hand joints.    IMPRESSION:  1.  New onset movement potential disorder.  2.  Chronic osteoarthritis, rheumatoid arthritis.  3.  Possibly reduce cognitive  status.    RECOMMENDATIONS:  See orders.  She will follow up with me in six to eight weeks   after her consults.  She is due to see a hematologist ____.  Recent clinic notes   were reviewed.      SELENA/HN  dd: 07/28/2017 15:23:24 (CDT)  td: 07/28/2017 23:35:34 (CDT)  Doc ID   #[id number]  Job ID #[id number]    CC:

## 2017-07-28 NOTE — TELEPHONE ENCOUNTER
----- Message from Sheron Engle sent at 7/28/2017  2:24 PM CDT -----  Pt is being referred by Dr. Lee. Please call pt to schedule at [phone].    Thank you

## 2017-08-02 ENCOUNTER — TELEPHONE (OUTPATIENT)
Dept: INTERNAL MEDICINE | Facility: CLINIC | Age: 82
End: 2017-08-02

## 2017-08-02 DIAGNOSIS — R10.9 ABDOMINAL PAIN, UNSPECIFIED LOCATION: Primary | ICD-10-CM

## 2017-08-02 NOTE — TELEPHONE ENCOUNTER
Dr. Lux is requesting to just speak to you about pain that she is experiencing. She would like to speak to you today.    Thanks,  MF

## 2017-08-02 NOTE — TELEPHONE ENCOUNTER
States has 'severe' abdominal pain - has pending CRS and Gyn appointments. Unclear whether this is her chronic sx or worsening.     Advise to go to ER or UC if this is severe and acute.     Seems she wants an answer as to a why. Advised that she does have an appt w/ Dr. Riojas to FU on the MRI - based on my last assessment, I don't have an answer for her.

## 2017-08-02 NOTE — TELEPHONE ENCOUNTER
----- Message from Eddy Harmon sent at 8/2/2017  3:23 PM CDT -----  Contact: Patient  Patient went to see doctor Torrey a couple of days ago and patient is currently in severe pain with the same problem she went see Dr. Hirsch for. Patient is asking that Dr. Hirsch or the Nurse call her IMMEDIATELY is she is in serious pain. Patient refused to speak to an oncall nurse when asked. Patient stated that she rather speak with Dr. Hirsch. Patient is requesting to speak with Dr. Hirsch before he leaves today. Contact info [phone]

## 2017-08-03 ENCOUNTER — OFFICE VISIT (OUTPATIENT)
Dept: INTERNAL MEDICINE | Facility: CLINIC | Age: 82
End: 2017-08-03
Attending: FAMILY MEDICINE
Payer: MEDICARE

## 2017-08-03 ENCOUNTER — LAB VISIT (OUTPATIENT)
Dept: LAB | Facility: HOSPITAL | Age: 82
End: 2017-08-03
Attending: FAMILY MEDICINE
Payer: MEDICARE

## 2017-08-03 VITALS
HEART RATE: 86 BPM | DIASTOLIC BLOOD PRESSURE: 62 MMHG | HEIGHT: 65 IN | WEIGHT: 163 LBS | SYSTOLIC BLOOD PRESSURE: 120 MMHG | BODY MASS INDEX: 27.16 KG/M2 | OXYGEN SATURATION: 98 %

## 2017-08-03 DIAGNOSIS — R10.9 ABDOMINAL PAIN, UNSPECIFIED LOCATION: Primary | ICD-10-CM

## 2017-08-03 DIAGNOSIS — G25.9 MOVEMENT DISORDER: ICD-10-CM

## 2017-08-03 DIAGNOSIS — R10.9 ABDOMINAL PAIN, UNSPECIFIED LOCATION: ICD-10-CM

## 2017-08-03 DIAGNOSIS — R26.9 ABNORMALITY OF GAIT AND MOBILITY: ICD-10-CM

## 2017-08-03 DIAGNOSIS — K58.2 IRRITABLE BOWEL SYNDROME WITH BOTH CONSTIPATION AND DIARRHEA: ICD-10-CM

## 2017-08-03 LAB
ALBUMIN SERPL BCP-MCNC: 3.6 G/DL
ALP SERPL-CCNC: 66 U/L
ALT SERPL W/O P-5'-P-CCNC: 10 U/L
ANION GAP SERPL CALC-SCNC: 12 MMOL/L
AST SERPL-CCNC: 12 U/L
BILIRUB SERPL-MCNC: 0.6 MG/DL
BILIRUB UR QL STRIP: NEGATIVE
BUN SERPL-MCNC: 15 MG/DL
CALCIUM SERPL-MCNC: 9.5 MG/DL
CHLORIDE SERPL-SCNC: 105 MMOL/L
CLARITY UR REFRACT.AUTO: CLEAR
CO2 SERPL-SCNC: 26 MMOL/L
COLOR UR AUTO: YELLOW
CREAT SERPL-MCNC: 0.8 MG/DL
ERYTHROCYTE [DISTWIDTH] IN BLOOD BY AUTOMATED COUNT: 14.4 %
EST. GFR  (AFRICAN AMERICAN): >60 ML/MIN/1.73 M^2
EST. GFR  (NON AFRICAN AMERICAN): >60 ML/MIN/1.73 M^2
GLUCOSE SERPL-MCNC: 107 MG/DL
GLUCOSE UR QL STRIP: NEGATIVE
HCT VFR BLD AUTO: 40.1 %
HGB BLD-MCNC: 12.6 G/DL
HGB UR QL STRIP: NEGATIVE
KETONES UR QL STRIP: NEGATIVE
LEUKOCYTE ESTERASE UR QL STRIP: NEGATIVE
LIPASE SERPL-CCNC: 24 U/L
MCH RBC QN AUTO: 27.3 PG
MCHC RBC AUTO-ENTMCNC: 31.4 G/DL
MCV RBC AUTO: 87 FL
MICROSCOPIC COMMENT: NORMAL
NITRITE UR QL STRIP: NEGATIVE
PH UR STRIP: 5 [PH] (ref 5–8)
PLATELET # BLD AUTO: 255 K/UL
PMV BLD AUTO: 9.3 FL
POTASSIUM SERPL-SCNC: 4.1 MMOL/L
PROT SERPL-MCNC: 7.8 G/DL
PROT UR QL STRIP: NEGATIVE
RBC # BLD AUTO: 4.62 M/UL
RBC #/AREA URNS AUTO: 0 /HPF (ref 0–4)
SODIUM SERPL-SCNC: 143 MMOL/L
SP GR UR STRIP: 1.02 (ref 1–1.03)
URN SPEC COLLECT METH UR: NORMAL
UROBILINOGEN UR STRIP-ACNC: NEGATIVE EU/DL
WBC # BLD AUTO: 9.98 K/UL
WBC #/AREA URNS AUTO: 0 /HPF (ref 0–5)

## 2017-08-03 PROCEDURE — 1159F MED LIST DOCD IN RCRD: CPT | Mod: ,,, | Performed by: FAMILY MEDICINE

## 2017-08-03 PROCEDURE — 85027 COMPLETE CBC AUTOMATED: CPT

## 2017-08-03 PROCEDURE — 83690 ASSAY OF LIPASE: CPT

## 2017-08-03 PROCEDURE — 81001 URINALYSIS AUTO W/SCOPE: CPT

## 2017-08-03 PROCEDURE — 99999 PR PBB SHADOW E&M-EST. PATIENT-LVL IV: CPT | Mod: PBBFAC,,, | Performed by: FAMILY MEDICINE

## 2017-08-03 PROCEDURE — 99214 OFFICE O/P EST MOD 30 MIN: CPT | Mod: PBBFAC | Performed by: FAMILY MEDICINE

## 2017-08-03 PROCEDURE — 87086 URINE CULTURE/COLONY COUNT: CPT

## 2017-08-03 PROCEDURE — 3008F BODY MASS INDEX DOCD: CPT | Mod: ,,, | Performed by: FAMILY MEDICINE

## 2017-08-03 PROCEDURE — 99214 OFFICE O/P EST MOD 30 MIN: CPT | Mod: S$PBB,,, | Performed by: FAMILY MEDICINE

## 2017-08-03 PROCEDURE — 36415 COLL VENOUS BLD VENIPUNCTURE: CPT

## 2017-08-03 PROCEDURE — 80053 COMPREHEN METABOLIC PANEL: CPT

## 2017-08-03 NOTE — MEDICAL/APP STUDENT
Subjective:       Patient ID: Selma Rosado MD Jose J is a 81 y.o. female.    Chief Complaint: Follow-up    HPI  Review of Systems   Constitutional: Positive for fatigue. Negative for activity change, appetite change, fever and unexpected weight change.   HENT: Positive for ear pain and postnasal drip. Negative for congestion, dental problem and tinnitus.    Eyes: Negative.    Respiratory: Positive for shortness of breath. Negative for cough and chest tightness.    Gastrointestinal: Positive for abdominal distention, abdominal pain and constipation. Negative for diarrhea and nausea.   Endocrine: Negative.    Genitourinary: Positive for frequency and urgency. Negative for difficulty urinating.   Musculoskeletal: Positive for arthralgias, back pain and myalgias.   Skin: Negative.    Allergic/Immunologic: Negative.    Neurological: Positive for numbness (Right knee and sciatica down right leg). Negative for dizziness and headaches.   Hematological: Negative.    Psychiatric/Behavioral: Positive for agitation and sleep disturbance. The patient is hyperactive.        Objective:      Physical Exam   Constitutional: She is oriented to person, place, and time. She appears well-developed and well-nourished.   HENT:   Head: Normocephalic and atraumatic.   Right Ear: External ear normal.   Left Ear: External ear normal.   Nose: Nose normal.   Mouth/Throat: Oropharynx is clear and moist.   Eyes: EOM are normal. Pupils are equal, round, and reactive to light.   Neck: Normal range of motion. Neck supple.   Cardiovascular: Normal rate, regular rhythm and intact distal pulses.    Murmur (3/6 systolic loudest in aortic area) heard.  Pulmonary/Chest: Effort normal and breath sounds normal.   Abdominal: Soft. Bowel sounds are normal. She exhibits distension.   Musculoskeletal: Normal range of motion.   Neurological: She is alert and oriented to person, place, and time.   Psychiatric: Judgment and thought content normal. Her affect is  labile. Her speech is rapid and/or pressured. She is agitated. Cognition and memory are impaired.   Unsure of baseline but patient does exhibit word finding difficulties, pressure and broken speech, thoughts are disorganized. Patient endorses decreased sleep and has not had a full night sleep in months.       Assessment:       No diagnosis found.    Plan:

## 2017-08-03 NOTE — PROGRESS NOTES
Subjective:       Patient ID: Selma Rosado MD Jose J is a 81 y.o. female.    Chief Complaint: Follow-up    HPI  Review of Systems   Constitutional: Positive for fatigue. Negative for chills and fever.   HENT: Negative for congestion and trouble swallowing.    Eyes: Negative for redness.   Respiratory: Negative for cough, chest tightness and shortness of breath.    Cardiovascular: Negative for chest pain, palpitations and leg swelling.   Gastrointestinal: Positive for abdominal distention and abdominal pain. Negative for blood in stool.   Genitourinary: Negative for hematuria and menstrual problem.   Musculoskeletal: Positive for arthralgias, back pain and gait problem. Negative for joint swelling, myalgias and neck pain.   Skin: Negative for color change and rash.   Neurological: Positive for tremors and weakness. Negative for speech difficulty, numbness and headaches.   Hematological: Negative for adenopathy. Does not bruise/bleed easily.   Psychiatric/Behavioral: Negative for behavioral problems, confusion and sleep disturbance. The patient is not nervous/anxious.        Objective:      Physical Exam   Constitutional: She is oriented to person, place, and time. She appears well-developed and well-nourished. No distress.   Eyes: No scleral icterus.   Neck: Neck supple.   Cardiovascular: Normal rate, regular rhythm, normal heart sounds and intact distal pulses.  Exam reveals no gallop and no friction rub.    No murmur heard.  Pulmonary/Chest: Effort normal. No respiratory distress.   Abdominal: Soft. Bowel sounds are normal. She exhibits no distension and no mass. There is no tenderness. There is no rebound, no guarding and no CVA tenderness.   Musculoskeletal: She exhibits no edema.        Right lower leg: She exhibits no edema.        Left lower leg: She exhibits no edema.   Neurological: She is alert and oriented to person, place, and time.   Skin: Skin is warm and dry. No rash noted.   Psychiatric: She has a  "normal mood and affect. Her behavior is normal. Judgment and thought content normal.   Nursing note and vitals reviewed.      Assessment:       1. Abdominal pain, unspecified location    2. Abnormality of gait and mobility    3. Movement disorder    4. Irritable bowel syndrome with both constipation and diarrhea        Plan:   Selma was seen today for follow-up.    Diagnoses and all orders for this visit:    Abdominal pain, unspecified location  -     Lipase; Future  -     Comprehensive metabolic panel; Future  -     Hematology Profile (CBC without Differential); Future  -     Urinalysis Microscopic  -     Urinalysis  -     Urine culture    Abnormality of gait and mobility    Movement disorder    Irritable bowel syndrome with both constipation and diarrhea      HISTORY OF PRESENT ILLNESS:  This is a patient who was in recently.  She is here   again with abdominal pain.  She called with "severe" pain yesterday, this was a   chronic complaint.  She has seen [facility] and has a pending   appointment.  Given uterine fibroids on her recent MRI, we did also consult GYN   as she has not been seen in some time.     She has a history of osteoarthritis or rheumatoid arthritis with multiple joint   pains and back pain.     She takes hyoscyamine with brief relief.     When asked about her mental status, she has difficulty finding words, but in   general thinks that her thought processes are okay.  She is concerned about pain   mostly.     She has no elimination changes, dietary or indigestion symptoms at this time.     During examination, she is noted to be in continual motion.  She does have some   tangentiality and some lability; however, in general, her orientation and   adjustment seems to be good.     RECOMMENDATIONS:  Add labs.  She has pending appointments for Neurology,   Colorectal Surgery, Gynecology; followup after above.  Her abdominal examination   today was completely benign.     "

## 2017-08-03 NOTE — TELEPHONE ENCOUNTER
Spoke with pt. Appt has been scheduled. Pt has been notified and verbalized understanding of appt details.

## 2017-08-05 LAB — BACTERIA UR CULT: NO GROWTH

## 2017-08-07 ENCOUNTER — OFFICE VISIT (OUTPATIENT)
Dept: SURGERY | Facility: CLINIC | Age: 82
End: 2017-08-07
Payer: MEDICARE

## 2017-08-07 VITALS
HEART RATE: 87 BPM | SYSTOLIC BLOOD PRESSURE: 145 MMHG | DIASTOLIC BLOOD PRESSURE: 71 MMHG | BODY MASS INDEX: 27.25 KG/M2 | WEIGHT: 163.56 LBS | HEIGHT: 65 IN

## 2017-08-07 DIAGNOSIS — R10.9 ABDOMINAL PAIN, UNSPECIFIED LOCATION: ICD-10-CM

## 2017-08-07 DIAGNOSIS — Z86.010 HISTORY OF COLON POLYPS: ICD-10-CM

## 2017-08-07 DIAGNOSIS — K57.30 DIVERTICULOSIS OF LARGE INTESTINE WITHOUT HEMORRHAGE: ICD-10-CM

## 2017-08-07 DIAGNOSIS — K59.00 CONSTIPATION, UNSPECIFIED CONSTIPATION TYPE: Primary | ICD-10-CM

## 2017-08-07 PROCEDURE — 99213 OFFICE O/P EST LOW 20 MIN: CPT | Mod: PBBFAC | Performed by: COLON & RECTAL SURGERY

## 2017-08-07 PROCEDURE — 3078F DIAST BP <80 MM HG: CPT | Mod: ,,, | Performed by: COLON & RECTAL SURGERY

## 2017-08-07 PROCEDURE — 1126F AMNT PAIN NOTED NONE PRSNT: CPT | Mod: ,,, | Performed by: COLON & RECTAL SURGERY

## 2017-08-07 PROCEDURE — 3008F BODY MASS INDEX DOCD: CPT | Mod: ,,, | Performed by: COLON & RECTAL SURGERY

## 2017-08-07 PROCEDURE — 1159F MED LIST DOCD IN RCRD: CPT | Mod: ,,, | Performed by: COLON & RECTAL SURGERY

## 2017-08-07 PROCEDURE — 3077F SYST BP >= 140 MM HG: CPT | Mod: ,,, | Performed by: COLON & RECTAL SURGERY

## 2017-08-07 PROCEDURE — 99213 OFFICE O/P EST LOW 20 MIN: CPT | Mod: S$PBB,,, | Performed by: COLON & RECTAL SURGERY

## 2017-08-07 PROCEDURE — 99999 PR PBB SHADOW E&M-EST. PATIENT-LVL III: CPT | Mod: PBBFAC,,, | Performed by: COLON & RECTAL SURGERY

## 2017-08-07 NOTE — LETTER
August 13, 2017        Bhargav Lee MD  [address]  [facility] [address]             [facility]  [address]  [facility] [address]  Phone: [phone]   Patient: Selma Alonzo Lux MD   MR Number: [id number]   YOB: 1935   Date of Visit: 8/7/2017       Dear Dr. Lee:    Thank you for referring Selma Lux to me for evaluation. Attached you will find relevant portions of my assessment and plan of care.    If you have questions, please do not hesitate to call me. I look forward to following Selma Lux along with you.    Sincerely,      Keenan Riojas MD            CC  No Recipients    Enclosure

## 2017-08-09 ENCOUNTER — TELEPHONE (OUTPATIENT)
Dept: HEMATOLOGY/ONCOLOGY | Facility: CLINIC | Age: 82
End: 2017-08-09

## 2017-08-10 RX ORDER — MONTELUKAST SODIUM 10 MG/1
TABLET ORAL
Qty: 90 TABLET | Refills: 2 | Status: ON HOLD | OUTPATIENT
Start: 2017-08-10 | End: 2018-05-01 | Stop reason: HOSPADM

## 2017-08-13 NOTE — PROGRESS NOTES
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    Chief Complaint: Follow-up    HPI  82 yo F, who is a new patient to me but has previously been seen by multiple [facility] providers, including Dr. Sanchez, Dr. Barney, and most recently Dr. Lopez, here with complaints of severe abdominal pain.   She reports issues with lifelong constipation and has small bowel movements every 1-2 days.  She uses MiraLAX intermittently and recently was started on NuLev.  She's had chronic abdominal pain for the better part of the past 2 years seen multiple providers.  Her diet seems to be poor in fiber intake and she does not take a fiber supplement.  She takes narcotics for chronic joint pain.  Her last colonoscopy was in September 2015 which showed left-sided diverticulosis as well as a 5 mm adenomatous polyp that was removed.  He most recently has seen Dr. Lopez who ordered an MR enterography which revealed no evidence of inflammatory bowel disease.  She is quite anxious and is a poor historian.  Despite my efforts to redirect the conversation to focus on her complaints she repeatedly complained about her care at our health system, stating that we are "more interested in opening new hospitals than helping patients."   She is extremely frustrated over her continued symptoms and her perceived lack of anyone willing to help her.      In addition to her recent MR enterography she had blood work done on August 3 which included a CBC, CMP, amylase, and urinalysis, all of which were normal.  A plain abdominal film done in March showed mild constipation.    MRE 7/12/17:  No evidence of inflammatory bowel disease.  Multiple uterine fibroids.  Bilateral renal cysts.    No family hx of CRC or IBD.    Review of patient's allergies indicates:  No Known Allergies    Past Medical History:   Diagnosis Date    Anemia     Arthritis     Hashimoto's disease     Hypertension     IGT (impaired glucose tolerance) 1/12/2016    " Joint pain     Multinodular goiter 1/12/2016       Past Surgical History:   Procedure Laterality Date    CATARACT EXTRACTION      COLONOSCOPY N/A 9/29/2015    Procedure: COLONOSCOPY;  Surgeon: FIDELINA Sanchez MD;  Location: [facility] ([address] [place] [address]);  Service: Endoscopy;  Laterality: N/A;    JOINT REPLACEMENT      right knee    KNEE SURGERY Left 12/31/2013    TKR    left parotidectomy      mixed tumor    SALIVARY GLAND SURGERY      TONSILLECTOMY         Current Outpatient Prescriptions   Medication Sig Dispense Refill    amlodipine (NORVASC) 5 MG tablet TAKE 1 TABLET DAILY 90 tablet 2    ferrous sulfate 325 (65 FE) MG EC tablet Take 325 mg by mouth 2 (two) times daily.       hydrochlorothiazide (HYDRODIURIL) 12.5 MG Tab TAKE 1 TABLET DAILY 90 tablet 2    hydrocodone-acetaminophen 5-325mg (NORCO) 5-325 mg per tablet Take 1 tablet by mouth every 24 hours as needed for Pain. 30 tablet 0    hydroxychloroquine (PLAQUENIL) 200 mg tablet TAKE 2 TABLETS ONCE DAILY 180 tablet 1    hyoscyamine (LEVSIN) 0.125 mg TbDL Take 1 tablet (0.125 mg total) by mouth every 4 (four) hours as needed for Cramping. 60 tablet 3    NEXIUM 40 mg capsule TAKE 1 CAPSULE DAILY 90 capsule 2    montelukast (SINGULAIR) 10 mg tablet TAKE 1 TABLET EVERY EVENING 90 tablet 2     No current facility-administered medications for this visit.        Family History   Problem Relation Age of Onset    Hypertension Mother     Prostate cancer Father      prostate cancer    Breast cancer Maternal Grandmother     Lupus Neg Hx     Psoriasis Neg Hx     Melanoma Neg Hx     Colon cancer Neg Hx        Social History     Social History    Marital status:      Spouse name: N/A    Number of children: N/A    Years of education: N/A     Social History Main Topics    Smoking status: Never Smoker    Smokeless tobacco: Never Used    Alcohol use No    Drug use: No    Sexual activity: No      Comment: ,  age 52,      Other Topics Concern     None     Social History Narrative    None       Review of Systems   Constitutional: Positive for fatigue. Negative for chills and fever.   HENT: Negative for congestion and sore throat.    Eyes: Negative for visual disturbance.   Respiratory: Negative for cough and shortness of breath.    Cardiovascular: Negative for chest pain and palpitations.   Gastrointestinal: Positive for abdominal distention, abdominal pain and constipation. Negative for anal bleeding, blood in stool, diarrhea, nausea, rectal pain and vomiting.   Endocrine: Negative for cold intolerance and heat intolerance.   Genitourinary: Negative for dysuria and frequency.   Musculoskeletal: Positive for arthralgias and back pain. Negative for neck pain.   Skin: Negative for rash.   Allergic/Immunologic: Negative for immunocompromised state.   Neurological: Positive for weakness. Negative for dizziness, light-headedness and headaches.   Hematological: Does not bruise/bleed easily.   Psychiatric/Behavioral: Negative for confusion. The patient is not nervous/anxious.        Objective:      Physical Exam   Constitutional: She is oriented to person, place, and time. She appears well-developed and well-nourished.   HENT:   Head: Normocephalic.   Pulmonary/Chest: Effort normal. No respiratory distress.   Abdominal: Soft. Bowel sounds are normal. She exhibits no distension and no mass. There is tenderness. There is no rebound and no guarding (diffuse, vague, non-specific).   Musculoskeletal: Normal range of motion.   Neurological: She is alert and oriented to person, place, and time.   Skin: Skin is warm and dry.   Psychiatric:   Anxious, agitated         Lab Results   Component Value Date    WBC 9.98 08/03/2017    HGB 12.6 08/03/2017    HCT 40.1 08/03/2017    MCV 87 08/03/2017     08/03/2017     BMP  Lab Results   Component Value Date     08/03/2017    K 4.1 08/03/2017     08/03/2017    CO2 26 08/03/2017    BUN 15 08/03/2017     CREATININE 0.8 08/03/2017    CALCIUM 9.5 08/03/2017    ANIONGAP 12 08/03/2017    ESTGFRAFRICA >60 08/03/2017    EGFRNONAA >60 08/03/2017     CMP  Sodium   Date Value Ref Range Status   08/03/2017 143 136 - 145 mmol/L Final     Potassium   Date Value Ref Range Status   08/03/2017 4.1 3.5 - 5.1 mmol/L Final     Chloride   Date Value Ref Range Status   08/03/2017 105 95 - 110 mmol/L Final     CO2   Date Value Ref Range Status   08/03/2017 26 23 - 29 mmol/L Final     Glucose   Date Value Ref Range Status   08/03/2017 107 70 - 110 mg/dL Final     BUN, Bld   Date Value Ref Range Status   08/03/2017 15 8 - 23 mg/dL Final     Creatinine   Date Value Ref Range Status   08/03/2017 0.8 0.5 - 1.4 mg/dL Final     Calcium   Date Value Ref Range Status   08/03/2017 9.5 8.7 - 10.5 mg/dL Final     Total Protein   Date Value Ref Range Status   08/03/2017 7.8 6.0 - 8.4 g/dL Final     Albumin   Date Value Ref Range Status   08/03/2017 3.6 3.5 - 5.2 g/dL Final     Total Bilirubin   Date Value Ref Range Status   08/03/2017 0.6 0.1 - 1.0 mg/dL Final     Comment:     For infants and newborns, interpretation of results should be based  on gestational age, weight and in agreement with clinical  observations.  Premature Infant recommended reference ranges:  Up to 24 hours.............<8.0 mg/dL  Up to 48 hours............<12.0 mg/dL  3-5 days..................<15.0 mg/dL  6-29 days.................<15.0 mg/dL       Alkaline Phosphatase   Date Value Ref Range Status   08/03/2017 66 55 - 135 U/L Final     AST   Date Value Ref Range Status   08/03/2017 12 10 - 40 U/L Final     ALT   Date Value Ref Range Status   08/03/2017 10 10 - 44 U/L Final     Anion Gap   Date Value Ref Range Status   08/03/2017 12 8 - 16 mmol/L Final     eGFR if    Date Value Ref Range Status   08/03/2017 >60 >60 mL/min/1.73 m^2 Final     eGFR if non    Date Value Ref Range Status   08/03/2017 >60 >60 mL/min/1.73 m^2 Final     Comment:      Calculation used to obtain the estimated glomerular filtration  rate (eGFR) is the CKD-EPI equation. Since race is unknown   in our information system, the eGFR values for   -American and Non--American patients are given   for each creatinine result.       No results found for: CEA    Assessment:       1. Constipation, unspecified constipation type    2. Abdominal pain, unspecified location    3. History of colon polyps    4. 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    She also has a history of diverticulosis and an adenomatous colon polyp on her last colonoscopy.    Increased fiber intake 25-30 g/day.    Increased fluid intake 8-10 glasses of water/day.    Daily fiber supplement.    Colace 100 mg twice a day regularly.    MiraLAX 1-2 times a day as needed.  NuLev as needed.    GI consultation for management of irritable bowel syndrome.    Repeat colonoscopy 5 years from last for surveillance..    Keenan Riojas MD, FACS, FASCRS

## 2017-08-16 ENCOUNTER — OFFICE VISIT (OUTPATIENT)
Dept: INTERNAL MEDICINE | Facility: CLINIC | Age: 82
End: 2017-08-16
Payer: MEDICARE

## 2017-08-16 VITALS
HEART RATE: 94 BPM | DIASTOLIC BLOOD PRESSURE: 56 MMHG | BODY MASS INDEX: 23.95 KG/M2 | HEIGHT: 65 IN | WEIGHT: 143.75 LBS | SYSTOLIC BLOOD PRESSURE: 118 MMHG | OXYGEN SATURATION: 96 % | TEMPERATURE: 98 F

## 2017-08-16 DIAGNOSIS — M54.16 RIGHT LUMBAR RADICULOPATHY: Primary | ICD-10-CM

## 2017-08-16 PROCEDURE — 3074F SYST BP LT 130 MM HG: CPT | Mod: ,,, | Performed by: INTERNAL MEDICINE

## 2017-08-16 PROCEDURE — 99213 OFFICE O/P EST LOW 20 MIN: CPT | Mod: S$PBB,,, | Performed by: INTERNAL MEDICINE

## 2017-08-16 PROCEDURE — 3078F DIAST BP <80 MM HG: CPT | Mod: ,,, | Performed by: INTERNAL MEDICINE

## 2017-08-16 PROCEDURE — 1159F MED LIST DOCD IN RCRD: CPT | Mod: ,,, | Performed by: INTERNAL MEDICINE

## 2017-08-16 PROCEDURE — 99999 PR PBB SHADOW E&M-EST. PATIENT-LVL III: CPT | Mod: PBBFAC,,, | Performed by: INTERNAL MEDICINE

## 2017-08-16 PROCEDURE — 1125F AMNT PAIN NOTED PAIN PRSNT: CPT | Mod: ,,, | Performed by: INTERNAL MEDICINE

## 2017-08-16 PROCEDURE — 99213 OFFICE O/P EST LOW 20 MIN: CPT | Mod: PBBFAC | Performed by: INTERNAL MEDICINE

## 2017-08-16 RX ORDER — GABAPENTIN 300 MG/1
300 CAPSULE ORAL 3 TIMES DAILY
Qty: 60 CAPSULE | Refills: 11 | Status: SHIPPED | OUTPATIENT
Start: 2017-08-16 | End: 2018-01-04 | Stop reason: SDUPTHER

## 2017-08-16 RX ORDER — GABAPENTIN 300 MG/1
300 CAPSULE ORAL 3 TIMES DAILY
Qty: 270 CAPSULE | Refills: 3 | Status: SHIPPED | OUTPATIENT
Start: 2017-08-16 | End: 2017-09-06 | Stop reason: SDUPTHER

## 2017-08-16 NOTE — PROGRESS NOTES
Subjective:       Patient ID: Selma Rosado MD Jose J is a 81 y.o. female.    Chief Complaint: Leg Pain (pain in the right leg. from hip to toe. pt states that she has noticed some pain off an on but it really flared up yesterday. 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  She has known lumbar radiculopathy and has been offered ERIN.  IT is unclear to me why she did not do that.  SHe has significant scoliosis and altered gait.  She is s/p left knee replacement.  She has had big GI workup but stills feel she does not completely empty her rectum with BM's.  Her mom  in the last year and she is still tearful about this.        Review of Systems   Constitutional: Negative for activity change, chills, fatigue and fever.   HENT: Negative for congestion, ear pain, nosebleeds, postnasal drip, sinus pressure and sore throat.    Eyes: Negative.  Negative for visual disturbance.   Respiratory: Negative for cough, chest tightness, shortness of breath and wheezing.    Cardiovascular: Negative for chest pain.   Gastrointestinal: Negative for abdominal pain, diarrhea, nausea and vomiting.   Genitourinary: Negative for difficulty urinating, dysuria, frequency and urgency.   Musculoskeletal: Negative for arthralgias and neck stiffness.   Skin: Negative for rash.   Neurological: Negative for dizziness, weakness and headaches.   Psychiatric/Behavioral: Negative for sleep disturbance. The patient is not nervous/anxious.        Objective:      Physical Exam   Abdominal: Normal appearance and bowel sounds are normal. She exhibits no mass. There is generalized tenderness. There is no rigidity and no guarding.   Musculoskeletal:        Arms:      Assessment:       1. Right lumbar radiculopathy        Plan:   Selma was seen today for leg pain and bloated.    Diagnoses and all orders for  this visit:    Right lumbar radiculopathy    Other orders  -     gabapentin (NEURONTIN) 300 MG capsule; Take 1 capsule (300 mg total) by mouth 3 (three) times daily.  -     gabapentin (NEURONTIN) 300 MG capsule; Take 1 capsule (300 mg total) by mouth 3 (three) times daily.

## 2017-08-21 ENCOUNTER — TELEPHONE (OUTPATIENT)
Dept: OPHTHALMOLOGY | Facility: CLINIC | Age: 82
End: 2017-08-21

## 2017-08-21 ENCOUNTER — OFFICE VISIT (OUTPATIENT)
Dept: OBSTETRICS AND GYNECOLOGY | Facility: CLINIC | Age: 82
End: 2017-08-21
Attending: OBSTETRICS & GYNECOLOGY
Payer: MEDICARE

## 2017-08-21 VITALS
WEIGHT: 161.38 LBS | HEIGHT: 65 IN | DIASTOLIC BLOOD PRESSURE: 70 MMHG | BODY MASS INDEX: 26.89 KG/M2 | SYSTOLIC BLOOD PRESSURE: 124 MMHG

## 2017-08-21 DIAGNOSIS — D25.2 INTRAMURAL AND SUBSEROUS LEIOMYOMA OF UTERUS: Primary | ICD-10-CM

## 2017-08-21 DIAGNOSIS — D25.1 INTRAMURAL AND SUBSEROUS LEIOMYOMA OF UTERUS: Primary | ICD-10-CM

## 2017-08-21 PROCEDURE — 1126F AMNT PAIN NOTED NONE PRSNT: CPT | Mod: ,,, | Performed by: OBSTETRICS & GYNECOLOGY

## 2017-08-21 PROCEDURE — 1159F MED LIST DOCD IN RCRD: CPT | Mod: ,,, | Performed by: OBSTETRICS & GYNECOLOGY

## 2017-08-21 PROCEDURE — 3074F SYST BP LT 130 MM HG: CPT | Mod: ,,, | Performed by: OBSTETRICS & GYNECOLOGY

## 2017-08-21 PROCEDURE — 3078F DIAST BP <80 MM HG: CPT | Mod: ,,, | Performed by: OBSTETRICS & GYNECOLOGY

## 2017-08-21 PROCEDURE — 99213 OFFICE O/P EST LOW 20 MIN: CPT | Mod: PBBFAC | Performed by: OBSTETRICS & GYNECOLOGY

## 2017-08-21 PROCEDURE — 99213 OFFICE O/P EST LOW 20 MIN: CPT | Mod: S$PBB,,, | Performed by: OBSTETRICS & GYNECOLOGY

## 2017-08-21 PROCEDURE — 99999 PR PBB SHADOW E&M-EST. PATIENT-LVL III: CPT | Mod: PBBFAC,,, | Performed by: OBSTETRICS & GYNECOLOGY

## 2017-08-21 NOTE — LETTER
August 25, 2017      Bhargav Lee MD  [address] [facility] [address]           [facility] [address]  [address] [facility] [address]  Phone: [phone]          Patient: Selma Rosado MD Jose J   MR Number: [id number]   YOB: 1935   Date of Visit: 8/21/2017       Dear Dr. Bhargav Lee:    Thank you for referring Selma Lux to me for evaluation. Attached you will find relevant portions of my assessment and plan of care.    If you have questions, please do not hesitate to call me. I look forward to following Selma Lux along with you.    Sincerely,    Janis Flores  CC:  No Recipients    If you would like to receive this communication electronically, please contact [email or web address] or [phone] to request more information on [employer] Link access.    For providers and/or their staff who would like to refer a patient to [facility], please contact us through our one-stop-shop provider referral line, [facility], at [phone].    If you feel you have received this communication in error or would no longer like to receive these types of communications, please e-mail [email or web address]

## 2017-08-21 NOTE — TELEPHONE ENCOUNTER
----- Message from Deysi Lagos sent at 8/21/2017 12:47 PM CDT -----  Contact: Selma Kaur would like to be seen sooner than October due to dryness and problems with vision off and on.  According to recall she needs an HVF and I was not sure if this would need to be done if she comes earlier.  Please contact patient to discuss.  She can be reached at [phone]

## 2017-08-22 ENCOUNTER — NURSE TRIAGE (OUTPATIENT)
Dept: ADMINISTRATIVE | Facility: CLINIC | Age: 82
End: 2017-08-22

## 2017-08-22 RX ORDER — PREDNISONE 5 MG/1
TABLET ORAL
Qty: 180 TABLET | Refills: 0 | Status: SHIPPED | OUTPATIENT
Start: 2017-08-22 | End: 2017-11-21 | Stop reason: SDUPTHER

## 2017-08-22 NOTE — TELEPHONE ENCOUNTER
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 She states she has followed physicians advice, but symptoms have not improved. Patient advised to go to ED for severe abdominal pain. Dr. Lux refused, she is requesting an appointment with Dr. Riojas for tomorrow.

## 2017-08-23 ENCOUNTER — OFFICE VISIT (OUTPATIENT)
Dept: NEUROLOGY | Facility: CLINIC | Age: 82
End: 2017-08-23
Attending: FAMILY MEDICINE
Payer: MEDICARE

## 2017-08-23 VITALS
WEIGHT: 162.5 LBS | BODY MASS INDEX: 27.07 KG/M2 | SYSTOLIC BLOOD PRESSURE: 104 MMHG | HEART RATE: 91 BPM | DIASTOLIC BLOOD PRESSURE: 59 MMHG | HEIGHT: 65 IN

## 2017-08-23 DIAGNOSIS — G31.84 MILD COGNITIVE IMPAIRMENT: ICD-10-CM

## 2017-08-23 DIAGNOSIS — R79.9 ABNORMAL FINDING OF BLOOD CHEMISTRY: ICD-10-CM

## 2017-08-23 DIAGNOSIS — M25.562 CHRONIC PAIN OF LEFT KNEE: ICD-10-CM

## 2017-08-23 DIAGNOSIS — G89.29 CHRONIC PAIN OF LEFT KNEE: ICD-10-CM

## 2017-08-23 DIAGNOSIS — G25.5 HEMICHOREA: Primary | ICD-10-CM

## 2017-08-23 DIAGNOSIS — Z11.4 ENCOUNTER FOR SCREENING FOR HIV: ICD-10-CM

## 2017-08-23 DIAGNOSIS — G62.9 NEUROPATHY: ICD-10-CM

## 2017-08-23 DIAGNOSIS — G25.9 EXTRAPYRAMIDAL AND MOVEMENT DISORDER: ICD-10-CM

## 2017-08-23 DIAGNOSIS — E55.9 VITAMIN D DEFICIENCY: ICD-10-CM

## 2017-08-23 PROCEDURE — 99999 PR PBB SHADOW E&M-EST. PATIENT-LVL IV: CPT | Mod: PBBFAC,GC,, | Performed by: STUDENT IN AN ORGANIZED HEALTH CARE EDUCATION/TRAINING PROGRAM

## 2017-08-23 PROCEDURE — 99214 OFFICE O/P EST MOD 30 MIN: CPT | Mod: PBBFAC | Performed by: STUDENT IN AN ORGANIZED HEALTH CARE EDUCATION/TRAINING PROGRAM

## 2017-08-23 PROCEDURE — 1159F MED LIST DOCD IN RCRD: CPT | Mod: GC,,, | Performed by: STUDENT IN AN ORGANIZED HEALTH CARE EDUCATION/TRAINING PROGRAM

## 2017-08-23 PROCEDURE — 99205 OFFICE O/P NEW HI 60 MIN: CPT | Mod: S$PBB,GC,, | Performed by: STUDENT IN AN ORGANIZED HEALTH CARE EDUCATION/TRAINING PROGRAM

## 2017-08-23 PROCEDURE — 3074F SYST BP LT 130 MM HG: CPT | Mod: GC,,, | Performed by: STUDENT IN AN ORGANIZED HEALTH CARE EDUCATION/TRAINING PROGRAM

## 2017-08-23 PROCEDURE — 3078F DIAST BP <80 MM HG: CPT | Mod: GC,,, | Performed by: STUDENT IN AN ORGANIZED HEALTH CARE EDUCATION/TRAINING PROGRAM

## 2017-08-23 PROCEDURE — 1126F AMNT PAIN NOTED NONE PRSNT: CPT | Mod: GC,,, | Performed by: STUDENT IN AN ORGANIZED HEALTH CARE EDUCATION/TRAINING PROGRAM

## 2017-08-23 NOTE — ASSESSMENT & PLAN NOTE
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 Lee with L-sided hemichorea.    Unclear etiology at this time, but concerned by prior imaging findings (2015) of abnormal T2 Flair signal in R tatyana.  Although the timing of starting hyoscyamine is suspicious for correlation with her symptoms, I do not believe this is a side-effect of hyoscyamine.     Recommendations  -MRI Brain w/ and w/o contrast  -Cardiolipin Ab, Lupus anticoagulant, Paraneoplastic autoantibody, ASO, TSH, vitamin studies, HIV, RPR, TSH, A1c, iron studies  -Return to clinic to be scheduled with Dr. Giang in 2-4 weeks after MRI and above labs complete.  I will try to attend clinic visit with Dr. Giang.

## 2017-08-23 NOTE — ASSESSMENT & PLAN NOTE
Recently started on gabapentin, would like to continue further trial of gabapentin at this time.    Recommendations  -TSH, vitamin studies, HIV, RPR, TSH, A1c  -Continue gabapentin, may increase slowly (300 mg every 5 days) to max dose of 300 mg TID

## 2017-08-23 NOTE — PROGRESS NOTES
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 Lee for evaluation of RLE pain and abnormal movements.    While her initial complaint is RLE pain as below, I believe her more pressing problem is abnormal writhing movements of her L-side that she describes as "twitching."  She believes these abnormal movements began 2-4 months prior, and have gradually been worsening.  She notes that she started hyoscyamine in 6/2017 for GI motility problems.  She states she has had some memory difficulty as she has aged.  She denies a family history of dementia, Crystal's, chorea.  Denies prior history of clot/stroke/DVT/PE.  Denies any changes in her sense of smell.  Reports that she has has constipation "all my [her] life."  Denies any auditory/visual hallucinations or personality changes (per pt).  Review of EMR does not currently reveal any dopamine-containing drugs.     Pt's initial complaint upon interview is 10/10, RLE, "intense" pain. States the pain occurs in her R foot, knee, and hip, but does not necessarily radiate between them.  She is unable to characterize this pain further as far as burning, stinging, aching, etc.  She states this pain started a few months prior, and has gradually been worsening since that time.  She denies any trauma, but notes a history of R knee replacement in 2009.  States she has a similar pain on LLE, but not as severe.  Gabapentin 300 Qdaily was started 1 week prior, does not believe it has offered significant help yet.  She takes norco ~Qdaily for pain relief.    Mood "sometimes you're alright, but sometimes you're not really good."  States she doesn't have enough money.  Endorses difficulty sleeping, loss of interests, feelings of guilt, decreased energy, decreased concentration, states appetite is good.  Denies suicidal/homicidal " ideation/plan.    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  Lives alone, has a young maid that she has hired to come help clean a few times per week.  Has two children (live in [place], [place]).  Denies recreational drug use, alcohol use, cigarette use.  She is currently able to maintain all her ADLs at this time.    Review of Systems:  Constitutional: no fever or chills  Eyes: no visual changes  ENT: no nasal congestion or sore throat  Respiratory: no cough or shortness of breath  Cardiovascular: no chest pain or palpitations  Gastrointestinal: no nausea or vomiting.  Endorses abd pain, constipation, dark stools.  Genitourinary: no hematuria or dysuria  Integument/Breast: no rash or pruritis  Musculoskeletal: endorses arthralgias   Neurological: positive for L-sided writhing movements, RLE intense pain  Behavioral/Psych: no auditory or visual hallucinations      Past Medical History  Past Medical History:   Diagnosis Date    Anemia     Arthritis     Hashimoto's disease     Hypertension     IGT (impaired glucose tolerance) 1/12/2016    Joint pain     Multinodular goiter 1/12/2016       Medications    Current Outpatient Prescriptions:     amlodipine (NORVASC) 5 MG tablet, TAKE 1 TABLET DAILY, Disp: 90 tablet, Rfl: 2    ferrous sulfate 325 (65 FE) MG EC tablet, Take 325 mg by mouth 2 (two) times daily. , Disp: , Rfl:     gabapentin (NEURONTIN) 300 MG capsule, Take 1 capsule (300 mg total) by mouth 3 (three) times daily., Disp: 60 capsule, Rfl: 11    gabapentin (NEURONTIN) 300 MG capsule, Take 1 capsule (300 mg total) by mouth 3 (three) times daily., Disp: 270 capsule, Rfl: 3    hydrocodone-acetaminophen 5-325mg (NORCO) 5-325 mg per tablet, Take 1 tablet by mouth every 24 hours as needed for Pain., Disp: 30 tablet, Rfl: 0    hydroxychloroquine (PLAQUENIL) 200 mg tablet, TAKE 2 TABLETS ONCE DAILY, Disp: 180 tablet, Rfl: 1    hyoscyamine  "(LEVSIN) 0.125 mg TbDL, Take 1 tablet (0.125 mg total) by mouth every 4 (four) hours as needed for Cramping., Disp: 60 tablet, Rfl: 3    montelukast (SINGULAIR) 10 mg tablet, TAKE 1 TABLET EVERY EVENING, Disp: 90 tablet, Rfl: 2    NEXIUM 40 mg capsule, TAKE 1 CAPSULE DAILY, Disp: 90 capsule, Rfl: 2    predniSONE (DELTASONE) 5 MG tablet, TAKE 2 TABLETS ONCE DAILY, Disp: 180 tablet, Rfl: 0    Allergies  Review of patient's allergies indicates:  No Known Allergies    Social History  Social History     Social History    Marital status:      Spouse name: N/A    Number of children: N/A    Years of education: N/A     Occupational History    Not on file.     Social History Main Topics    Smoking status: Never Smoker    Smokeless tobacco: Never Used    Alcohol use No    Drug use: No    Sexual activity: No      Comment: ,  age 52,      Other Topics Concern    Not on file     Social History Narrative    No narrative on file       Family History  Family History   Problem Relation Age of Onset    Hypertension Mother     Prostate cancer Father      prostate cancer    Breast cancer Maternal Grandmother     Lupus Neg Hx     Psoriasis Neg Hx     Melanoma Neg Hx     Colon cancer Neg Hx        Physical Exam  BP (!) 104/59   Pulse 91   Ht 5' 5" (1.651 m)   Wt 73.7 kg (162 lb 7.7 oz)   BMI 27.04 kg/m²     General: Well-developed, well-groomed. No apparent distress  Cardiovascular: Regular rate and rhythm with no murmurs, rubs or gallops.  Chest: Lungs clear to auscultation bilaterally.  No wheezes, stridor, ronchi appreciated.  Abdomen: Normoactive bowel sounds present.  Soft, nontender to palpation.  Extremities: No peripheral edema    Neurologic Exam: The patient is awake, alert and oriented to person/place/time. Language is fluent.  MOCA 21 as below.    Cranial nerves:   Pupils are round and reactive to light and accommodation.   Visual fields are full to confrontation.    Ocular motility is " full in all cardinal positions of gaze.   Facial sensation is normal to light touch.   Facial activation is symmetric.   Hearing is symmetric bilaterally.   Palate elevates symmetrically.   Shoulder elevation and strength are decreased on L, associated with shoulder pain  Tongue is midline and neck rotation strength is normal bilaterally.     Motor examination of all extremities demonstrates normal bulk and tone in all four limbs. There are no atrophy or fasciculations. Strength is 5/5 in the upper and lower extremities bilaterally.  She exhibits writhing movements of her LUE and LLE predominantly.  Occasionally she will exhibit similar movements on the R.  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  She is able to briefly suppress these movements, although they seem to worsen afterwards.  During out interview, she also demonstrated occasional L hand resting tremor.      Sensory examination is normal light touch in BUE and BLE.  Romberg is negative.     Deep tendon reflexes are 3+ and symmetric b/l brachioradialis, biceps, triceps.  2+ in achilles b/l.  0 b/l patellar.  Plantar stimulation: L is equivocal, R is downgoing.  No Davis's b/l.     Gait: Normal heel, toe, tandem, and casual gait.    Coordination: L finger/nose demonstrates mild dysmetria.  R Finger to nose and b/l heel to shin testing is normal.              Lab and Test Results    WBC   Date Value Ref Range Status   08/03/2017 9.98 3.90 - 12.70 K/uL Final   07/28/2017 8.51 3.90 - 12.70 K/uL Final   03/30/2017 8.76 3.90 - 12.70 K/uL Final     Hemoglobin   Date Value Ref Range Status   08/03/2017 12.6 12.0 - 16.0 g/dL Final   07/28/2017 12.6 12.0 - 16.0 g/dL Final   03/30/2017 11.8 (L) 12.0 - 16.0 g/dL Final     Hematocrit   Date Value Ref Range Status   08/03/2017 40.1 37.0 - 48.5 % Final   07/28/2017 40.7 37.0 - 48.5 % Final   03/30/2017 37.7 37.0 - 48.5 % Final     Platelets   Date Value Ref Range Status   08/03/2017 255 150 - 350  K/uL Final   07/28/2017 264 150 - 350 K/uL Final   03/30/2017 285 150 - 350 K/uL Final     Glucose   Date Value Ref Range Status   08/03/2017 107 70 - 110 mg/dL Final   05/10/2017 76 70 - 110 mg/dL Final   04/13/2016 80 70 - 110 mg/dL Final   04/13/2016 80 70 - 110 mg/dL Final     Sodium   Date Value Ref Range Status   08/03/2017 143 136 - 145 mmol/L Final   05/10/2017 141 136 - 145 mmol/L Final   04/13/2016 139 136 - 145 mmol/L Final   04/13/2016 139 136 - 145 mmol/L Final     Potassium   Date Value Ref Range Status   08/03/2017 4.1 3.5 - 5.1 mmol/L Final   05/10/2017 4.2 3.5 - 5.1 mmol/L Final   04/13/2016 4.0 3.5 - 5.1 mmol/L Final   04/13/2016 4.0 3.5 - 5.1 mmol/L Final     Chloride   Date Value Ref Range Status   08/03/2017 105 95 - 110 mmol/L Final   05/10/2017 101 95 - 110 mmol/L Final   04/13/2016 104 95 - 110 mmol/L Final   04/13/2016 104 95 - 110 mmol/L Final     CO2   Date Value Ref Range Status   08/03/2017 26 23 - 29 mmol/L Final   05/10/2017 31 (H) 23 - 29 mmol/L Final   04/13/2016 26 23 - 29 mmol/L Final   04/13/2016 26 23 - 29 mmol/L Final     BUN, Bld   Date Value Ref Range Status   08/03/2017 15 8 - 23 mg/dL Final   05/10/2017 17 8 - 23 mg/dL Final   04/13/2016 13 8 - 23 mg/dL Final   04/13/2016 13 8 - 23 mg/dL Final     Creatinine   Date Value Ref Range Status   08/03/2017 0.8 0.5 - 1.4 mg/dL Final   06/28/2017 1.0 0.5 - 1.4 mg/dL Final   05/10/2017 0.9 0.5 - 1.4 mg/dL Final     Calcium   Date Value Ref Range Status   08/03/2017 9.5 8.7 - 10.5 mg/dL Final   05/10/2017 9.6 8.7 - 10.5 mg/dL Final   04/13/2016 9.1 8.7 - 10.5 mg/dL Final   04/13/2016 9.1 8.7 - 10.5 mg/dL Final     Magnesium   Date Value Ref Range Status   02/04/2015 1.9 1.6 - 2.6 mg/dL Final   05/02/2011 2.1 1.6 - 2.6 mg/dl Final     Phosphorus   Date Value Ref Range Status   05/02/2011 3.1 2.7 - 4.5 mg/dl Final     Alkaline Phosphatase   Date Value Ref Range Status   08/03/2017 66 55 - 135 U/L Final   05/10/2017 75 55 - 135 U/L Final    04/13/2016 73 55 - 135 U/L Final     ALT   Date Value Ref Range Status   08/03/2017 10 10 - 44 U/L Final   05/10/2017 7 (L) 10 - 44 U/L Final   04/13/2016 8 (L) 10 - 44 U/L Final     AST   Date Value Ref Range Status   08/03/2017 12 10 - 40 U/L Final   05/10/2017 9 (L) 10 - 40 U/L Final   04/13/2016 12 10 - 40 U/L Final         Images:   8/11/2015 MRI Brain: Per resident review,  Abnormal T2 flair signal in R tatyana.        3/2017 MRI Lumbar Spine: Per resident review,  Severe degenerative changes of Lumbar spine, noting severe canal stenosis of L4-5, L5-S1.        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 Lee with L-sided hemichorea.    Unclear etiology at this time, but concerned by prior imaging findings (2015) of abnormal T2 Flair signal in R tatyana.  Although the timing of starting hyoscyamine is suspicious for correlation with her symptoms, I do not believe this is a side-effect of hyoscyamine.     Recommendations  -MRI Brain w/ and w/o contrast  -Cardiolipin Ab, Lupus anticoagulant, Paraneoplastic autoantibody, ASO, TSH, vitamin studies, HIV, RPR, TSH, A1c, iron studies  -Return to clinic to be scheduled with Dr. Giang in 2-4 weeks after MRI and above labs complete.  I will try to attend clinic visit with Dr. Giang.         Relevant Orders    MRI Brain W WO Contrast    HEMOGLOBIN A1C    TSH    VITAMIN B12    VITAMIN B1    VITAMIN E    VITAMIN D    VITAMIN B6    Paraneoplastic Autoantibody Evaulation, Serum    LUPUS ANTICOAGULANT (DRVVT)    Cardiolipin antibody    RPR    HIV 1 / 2 ANTIBODY    STREPTOCOCCAL ANTIBODIES (ASO)    IRON AND TIBC    Neuropathy    Current Assessment & Plan     Recently started on gabapentin, would like to continue further trial of gabapentin at this  time.    Recommendations  -TSH, vitamin studies, HIV, RPR, TSH, A1c  -Continue gabapentin, may increase slowly (300 mg every 5 days) to max dose of 300 mg TID         Mild cognitive impairment    Current Assessment & Plan     MOCA 21 on 8/23.  Continues to maintain her ADLs at this point.    Recommendations  -MRI Brain w/ and w/o  -TSH, vitamin studies, HIV, RPR, TSH, A1c         Relevant Orders    MRI Brain W WO Contrast    HEMOGLOBIN A1C    TSH    VITAMIN B12    VITAMIN B1    VITAMIN E    VITAMIN D    VITAMIN B6    RPR    HIV 1 / 2 ANTIBODY    IRON AND TIBC      Other Visit Diagnoses     Abnormal finding of blood chemistry         Relevant Orders    HEMOGLOBIN A1C    Extrapyramidal and movement disorder         Relevant Orders    VITAMIN B12    Vitamin D deficiency         Relevant Orders    VITAMIN D    Encounter for screening for HIV         Relevant Orders    HIV 1 / 2 ANTIBODY    Chronic pain of left knee         Relevant Orders    IRON AND TIBC            Branden Rubio MD  Neurology Resident   [facility] [address] [place] [address]

## 2017-08-23 NOTE — ASSESSMENT & PLAN NOTE
MOCA 21 on 8/23.  Continues to maintain her ADLs at this point.    Recommendations  -MRI Brain w/ and w/o  -TSH, vitamin studies, HIV, RPR, TSH, A1c

## 2017-08-23 NOTE — PATIENT INSTRUCTIONS
-Blood work  -MRI of brain  -Increase gabapentin by 300 mg per day over 5 days as tolerated to max of 300 mg three times per day  -Return to clinic to follow up with Dr. Giang and Dr. Rubio in 2-4 weeks

## 2017-08-24 ENCOUNTER — OFFICE VISIT (OUTPATIENT)
Dept: HEMATOLOGY/ONCOLOGY | Facility: CLINIC | Age: 82
End: 2017-08-24
Payer: MEDICARE

## 2017-08-24 ENCOUNTER — LAB VISIT (OUTPATIENT)
Dept: LAB | Facility: HOSPITAL | Age: 82
End: 2017-08-24
Payer: MEDICARE

## 2017-08-24 ENCOUNTER — CLINICAL SUPPORT (OUTPATIENT)
Dept: OPHTHALMOLOGY | Facility: CLINIC | Age: 82
End: 2017-08-24
Payer: MEDICARE

## 2017-08-24 VITALS
TEMPERATURE: 98 F | HEIGHT: 65 IN | DIASTOLIC BLOOD PRESSURE: 54 MMHG | WEIGHT: 164.44 LBS | BODY MASS INDEX: 27.4 KG/M2 | HEART RATE: 82 BPM | RESPIRATION RATE: 18 BRPM | OXYGEN SATURATION: 98 % | SYSTOLIC BLOOD PRESSURE: 127 MMHG

## 2017-08-24 DIAGNOSIS — D50.9 IRON DEFICIENCY ANEMIA: ICD-10-CM

## 2017-08-24 DIAGNOSIS — M05.79 SEROPOSITIVE RHEUMATOID ARTHRITIS OF MULTIPLE SITES: ICD-10-CM

## 2017-08-24 DIAGNOSIS — D50.0 IRON DEFICIENCY ANEMIA DUE TO CHRONIC BLOOD LOSS: Primary | ICD-10-CM

## 2017-08-24 DIAGNOSIS — G31.84 MILD COGNITIVE IMPAIRMENT: ICD-10-CM

## 2017-08-24 DIAGNOSIS — G89.29 CHRONIC PAIN OF LEFT KNEE: ICD-10-CM

## 2017-08-24 DIAGNOSIS — Z11.4 ENCOUNTER FOR SCREENING FOR HIV: ICD-10-CM

## 2017-08-24 DIAGNOSIS — G25.5 HEMICHOREA: ICD-10-CM

## 2017-08-24 DIAGNOSIS — T37.8X5D: ICD-10-CM

## 2017-08-24 DIAGNOSIS — R79.9 ABNORMAL FINDING OF BLOOD CHEMISTRY: ICD-10-CM

## 2017-08-24 DIAGNOSIS — M25.562 CHRONIC PAIN OF LEFT KNEE: ICD-10-CM

## 2017-08-24 DIAGNOSIS — E55.9 VITAMIN D DEFICIENCY: ICD-10-CM

## 2017-08-24 DIAGNOSIS — G25.9 EXTRAPYRAMIDAL AND MOVEMENT DISORDER: ICD-10-CM

## 2017-08-24 LAB
25(OH)D3+25(OH)D2 SERPL-MCNC: 44 NG/ML
BASOPHILS # BLD AUTO: 0.02 K/UL
BASOPHILS NFR BLD: 0.3 %
DIFFERENTIAL METHOD: ABNORMAL
EOSINOPHIL # BLD AUTO: 0.2 K/UL
EOSINOPHIL NFR BLD: 2.3 %
ERYTHROCYTE [DISTWIDTH] IN BLOOD BY AUTOMATED COUNT: 14.1 %
FERRITIN SERPL-MCNC: 746 NG/ML
HCT VFR BLD AUTO: 36.2 %
HGB BLD-MCNC: 11.5 G/DL
IRON SERPL-MCNC: 29 UG/DL
LYMPHOCYTES # BLD AUTO: 0.8 K/UL
LYMPHOCYTES NFR BLD: 10.9 %
MCH RBC QN AUTO: 26.7 PG
MCHC RBC AUTO-ENTMCNC: 31.8 G/DL
MCV RBC AUTO: 84 FL
MONOCYTES # BLD AUTO: 0.8 K/UL
MONOCYTES NFR BLD: 11.3 %
NEUTROPHILS # BLD AUTO: 5.2 K/UL
NEUTROPHILS NFR BLD: 74.9 %
PLATELET # BLD AUTO: 286 K/UL
PMV BLD AUTO: 9.2 FL
RBC # BLD AUTO: 4.3 M/UL
SATURATED IRON: 10 %
TOTAL IRON BINDING CAPACITY: 289 UG/DL
TRANSFERRIN SERPL-MCNC: 195 MG/DL
TSH SERPL DL<=0.005 MIU/L-ACNC: 0.78 UIU/ML
VIT B12 SERPL-MCNC: 1124 PG/ML
WBC # BLD AUTO: 6.97 K/UL

## 2017-08-24 PROCEDURE — 99215 OFFICE O/P EST HI 40 MIN: CPT | Mod: S$PBB,,, | Performed by: INTERNAL MEDICINE

## 2017-08-24 PROCEDURE — 99213 OFFICE O/P EST LOW 20 MIN: CPT | Mod: PBBFAC | Performed by: INTERNAL MEDICINE

## 2017-08-24 PROCEDURE — 99999 PR PBB SHADOW E&M-EST. PATIENT-LVL I: CPT | Mod: PBBFAC,,,

## 2017-08-24 PROCEDURE — 3078F DIAST BP <80 MM HG: CPT | Mod: ,,, | Performed by: INTERNAL MEDICINE

## 2017-08-24 PROCEDURE — 92134 CPTRZ OPH DX IMG PST SGM RTA: CPT | Mod: PBBFAC

## 2017-08-24 PROCEDURE — 99211 OFF/OP EST MAY X REQ PHY/QHP: CPT | Mod: PBBFAC,27

## 2017-08-24 PROCEDURE — 1126F AMNT PAIN NOTED NONE PRSNT: CPT | Mod: ,,, | Performed by: INTERNAL MEDICINE

## 2017-08-24 PROCEDURE — 3074F SYST BP LT 130 MM HG: CPT | Mod: ,,, | Performed by: INTERNAL MEDICINE

## 2017-08-24 PROCEDURE — 99999 PR PBB SHADOW E&M-EST. PATIENT-LVL III: CPT | Mod: PBBFAC,,, | Performed by: INTERNAL MEDICINE

## 2017-08-24 PROCEDURE — 92134 CPTRZ OPH DX IMG PST SGM RTA: CPT | Mod: 26,S$PBB,, | Performed by: OPHTHALMOLOGY

## 2017-08-24 PROCEDURE — 1159F MED LIST DOCD IN RCRD: CPT | Mod: ,,, | Performed by: INTERNAL MEDICINE

## 2017-08-25 LAB
CARDIOLIPIN IGG SER IA-ACNC: <9.4 GPL
CARDIOLIPIN IGM SER IA-ACNC: <9.4 MPL
ESTIMATED AVG GLUCOSE: 111 MG/DL
HBA1C MFR BLD HPLC: 5.5 %
HIV 1+2 AB+HIV1 P24 AG SERPL QL IA: NEGATIVE
RPR SER QL: NORMAL

## 2017-08-25 NOTE — PROGRESS NOTES
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 Upper and lower endoscopy has been negative for GI blood loss. Urinalysis is negative for hematuria. There is no clinical blood loss.   Dietary heme iron is adequate. She takes interval iron supplements. She remains anemic and iron deficient.     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 CBC has a mild anemia, but no evidence of iron deficient by ferritin.    HPI  Review of Systems   Constitutional: Negative.    HENT: Negative.    Eyes: Negative.    Respiratory: Negative.    Cardiovascular: Positive for leg swelling.   Gastrointestinal: Negative for blood in stool.   Endocrine: Negative.    Genitourinary: Negative for hematuria.   Musculoskeletal: Positive for arthralgias.   Skin: Negative.    Allergic/Immunologic: Negative for environmental allergies, food allergies and immunocompromised state.   Neurological: Negative.    Hematological: Negative for adenopathy. Does not bruise/bleed easily.   Psychiatric/Behavioral: Negative.        Objective:      Physical Exam   Constitutional: She is oriented to person, place, and time. She appears well-developed and well-nourished.   HENT:   Head: Normocephalic and atraumatic.   Eyes: Conjunctivae are normal.   Neck: Normal range of motion. Neck supple.   Cardiovascular: Normal rate and intact distal pulses.    Pulmonary/Chest: Effort normal. No respiratory distress.   Abdominal: She exhibits no distension. There is no tenderness.   Neurological: She is alert and oriented to person, place, and time.   Skin: Skin is warm and dry.   Psychiatric: She has a normal mood and affect. Her behavior is normal. Judgment and thought content normal.   Nursing note and vitals reviewed.      Assessment:       1. Iron  deficiency anemia due to chronic blood loss        Plan:       Repeat CBC and ferritin in 6 months  Continue oral iron  Continue dietary heme iron

## 2017-08-25 NOTE — TELEPHONE ENCOUNTER
----- Message from Lorelei Romo sent at 8/25/2017  3:11 PM CDT -----  Contact: self/[phone]      RX request - refill or new RX.  Is this a refill or new RX:  Refill  RX name and strength: hydrocodone-acetaminophen 5-325mg (NORCO) 5-325 mg per tablet  Directions:   Is this a 30 day or 90 day RX:    Pharmacy name and phone #: [id number] [id number] - [place], [place] - [address] AT [facility]. [phone] (Phone)  Comments:  Please call and advise.     Thank you!!!

## 2017-08-26 LAB — ASO AB SERPL-ACNC: 60 IU/ML

## 2017-08-28 ENCOUNTER — OFFICE VISIT (OUTPATIENT)
Dept: OPHTHALMOLOGY | Facility: CLINIC | Age: 82
End: 2017-08-28
Payer: MEDICARE

## 2017-08-28 ENCOUNTER — TELEPHONE (OUTPATIENT)
Dept: NEUROLOGY | Facility: CLINIC | Age: 82
End: 2017-08-28

## 2017-08-28 DIAGNOSIS — H35.012 RETINAL MACROANEURYSM OF LEFT EYE: Primary | ICD-10-CM

## 2017-08-28 DIAGNOSIS — H34.8322 BRANCH RETINAL VEIN OCCLUSION, LEFT EYE: ICD-10-CM

## 2017-08-28 DIAGNOSIS — H35.352 CYSTOID MACULAR EDEMA, LEFT EYE: ICD-10-CM

## 2017-08-28 PROCEDURE — 92014 COMPRE OPH EXAM EST PT 1/>: CPT | Mod: S$PBB,,, | Performed by: OPHTHALMOLOGY

## 2017-08-28 PROCEDURE — 99999 PR PBB SHADOW E&M-EST. PATIENT-LVL III: CPT | Mod: PBBFAC,,, | Performed by: OPHTHALMOLOGY

## 2017-08-28 PROCEDURE — 92083 EXTENDED VISUAL FIELD XM: CPT | Mod: PBBFAC | Performed by: OPHTHALMOLOGY

## 2017-08-28 PROCEDURE — 99213 OFFICE O/P EST LOW 20 MIN: CPT | Mod: PBBFAC | Performed by: OPHTHALMOLOGY

## 2017-08-28 PROCEDURE — 92226 PR SPECIAL EYE EXAM, SUBSEQUENT: CPT | Mod: S$PBB,LT,, | Performed by: OPHTHALMOLOGY

## 2017-08-28 PROCEDURE — 92134 CPTRZ OPH DX IMG PST SGM RTA: CPT | Mod: PBBFAC | Performed by: OPHTHALMOLOGY

## 2017-08-28 PROCEDURE — 92226 PR SPECIAL EYE EXAM, SUBSEQUENT: CPT | Mod: 50,PBBFAC | Performed by: OPHTHALMOLOGY

## 2017-08-28 RX ORDER — HYDROCODONE BITARTRATE AND ACETAMINOPHEN 5; 325 MG/1; MG/1
1 TABLET ORAL
Qty: 30 TABLET | Refills: 0 | Status: SHIPPED | OUTPATIENT
Start: 2017-08-28 | End: 2017-11-22 | Stop reason: SDUPTHER

## 2017-08-28 NOTE — TELEPHONE ENCOUNTER
----- Message from Branden Rubio MD sent at 8/23/2017  6:34 PM CDT -----  Link Hobbs,    Please schedule this pt in Dr. Giang's clinic in the next 2-4 weeks after she has completed her MRI and blood work.      Dr. Giang - I'm on research next month, so whenever she is scheduled, I'd love to come by and see her with you in clinic!      Thanks,  Branden

## 2017-08-29 ENCOUNTER — HOSPITAL ENCOUNTER (OUTPATIENT)
Dept: RADIOLOGY | Facility: HOSPITAL | Age: 82
Discharge: HOME | End: 2017-08-29
Attending: STUDENT IN AN ORGANIZED HEALTH CARE EDUCATION/TRAINING PROGRAM
Payer: MEDICARE

## 2017-08-29 ENCOUNTER — TELEPHONE (OUTPATIENT)
Dept: NEUROLOGY | Facility: HOSPITAL | Age: 82
End: 2017-08-29

## 2017-08-29 DIAGNOSIS — G31.84 MILD COGNITIVE IMPAIRMENT: ICD-10-CM

## 2017-08-29 DIAGNOSIS — G25.5 HEMICHOREA: ICD-10-CM

## 2017-08-29 LAB
A-TOCOPHEROL VIT E SERPL-MCNC: 866 UG/DL (ref 500–1800)
APTT HEX PL PPP: POSITIVE S
PYRIDOXAL SERPL-MCNC: 7 UG/L (ref 5–50)
VIT B1 SERPL-MCNC: 34 UG/L (ref 38–122)

## 2017-08-29 PROCEDURE — A9585 GADOBUTROL INJECTION: HCPCS | Performed by: STUDENT IN AN ORGANIZED HEALTH CARE EDUCATION/TRAINING PROGRAM

## 2017-08-29 PROCEDURE — 70553 MRI BRAIN STEM W/O & W/DYE: CPT | Mod: 26,,, | Performed by: RADIOLOGY

## 2017-08-29 PROCEDURE — 70553 MRI BRAIN STEM W/O & W/DYE: CPT | Mod: TC

## 2017-08-29 PROCEDURE — 25500020 PHARM REV CODE 255: Performed by: STUDENT IN AN ORGANIZED HEALTH CARE EDUCATION/TRAINING PROGRAM

## 2017-08-29 RX ORDER — LANOLIN ALCOHOL/MO/W.PET/CERES
100 CREAM (GRAM) TOPICAL DAILY
Status: ON HOLD | COMMUNITY
Start: 2017-08-29 | End: 2018-05-01

## 2017-08-29 RX ORDER — GADOBUTROL 604.72 MG/ML
7 INJECTION INTRAVENOUS
Status: COMPLETED | OUTPATIENT
Start: 2017-08-29 | End: 2017-08-29

## 2017-08-29 RX ADMIN — GADOBUTROL 7 ML: 604.72 INJECTION INTRAVENOUS at 08:08

## 2017-08-29 NOTE — TELEPHONE ENCOUNTER
Thiamine low, will replete with 100 mg thiamine PO Qdaily.  Spoke with Dr. Lux who conveyed understanding, and will get thiamine OTC from local pharmacy.    Branden Rubio MD

## 2017-08-30 ENCOUNTER — OFFICE VISIT (OUTPATIENT)
Dept: NEUROLOGY | Facility: CLINIC | Age: 82
End: 2017-08-30
Payer: MEDICARE

## 2017-08-30 VITALS
HEIGHT: 65 IN | BODY MASS INDEX: 26.93 KG/M2 | SYSTOLIC BLOOD PRESSURE: 113 MMHG | WEIGHT: 161.63 LBS | DIASTOLIC BLOOD PRESSURE: 66 MMHG | HEART RATE: 100 BPM

## 2017-08-30 DIAGNOSIS — G25.5 HEMICHOREA: Primary | ICD-10-CM

## 2017-08-30 DIAGNOSIS — G31.84 MCI (MILD COGNITIVE IMPAIRMENT): ICD-10-CM

## 2017-08-30 PROCEDURE — 3078F DIAST BP <80 MM HG: CPT | Mod: ,,, | Performed by: PSYCHIATRY & NEUROLOGY

## 2017-08-30 PROCEDURE — 99213 OFFICE O/P EST LOW 20 MIN: CPT | Mod: PBBFAC | Performed by: PSYCHIATRY & NEUROLOGY

## 2017-08-30 PROCEDURE — 99215 OFFICE O/P EST HI 40 MIN: CPT | Mod: S$PBB,,, | Performed by: PSYCHIATRY & NEUROLOGY

## 2017-08-30 PROCEDURE — 3074F SYST BP LT 130 MM HG: CPT | Mod: ,,, | Performed by: PSYCHIATRY & NEUROLOGY

## 2017-08-30 PROCEDURE — 1125F AMNT PAIN NOTED PAIN PRSNT: CPT | Mod: ,,, | Performed by: PSYCHIATRY & NEUROLOGY

## 2017-08-30 PROCEDURE — 99999 PR PBB SHADOW E&M-EST. PATIENT-LVL III: CPT | Mod: PBBFAC,,, | Performed by: PSYCHIATRY & NEUROLOGY

## 2017-08-30 PROCEDURE — 1159F MED LIST DOCD IN RCRD: CPT | Mod: ,,, | Performed by: PSYCHIATRY & NEUROLOGY

## 2017-08-30 RX ORDER — DIAZEPAM 2 MG/1
2 TABLET ORAL EVERY 6 HOURS PRN
Qty: 30 TABLET | Refills: 1 | Status: SHIPPED | OUTPATIENT
Start: 2017-08-30 | End: 2017-09-27 | Stop reason: SDUPTHER

## 2017-08-30 RX ORDER — TETRABENAZINE 12.5 MG/1
12.5 TABLET, COATED ORAL DAILY
Qty: 90 TABLET | Refills: 5 | Status: SHIPPED | OUTPATIENT
Start: 2017-08-30 | End: 2017-09-27

## 2017-08-30 NOTE — PROGRESS NOTES
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 Lee for evaluation of RLE pain and abnormal movements.    While her initial complaint is RLE pain as below, I believe her more pressing problem is abnormal writhing movements of her L-side that she describes as "twitching."  She believes these abnormal movements began 2-4 months prior, and have gradually been worsening.  She notes that she started hyoscyamine in 6/2017 for GI motility problems.  She states she has had some memory difficulty as she has aged.  She denies a family history of dementia, Rutland's, chorea.  Denies prior history of clot/stroke/DVT/PE.  Denies any changes in her sense of smell.  Reports that she has has constipation "all my [her] life."  Denies any auditory/visual hallucinations or personality changes (per pt).  Review of EMR does not currently reveal any dopamine-containing drugs.     Pt's initial complaint upon interview is 10/10, RLE, "intense" pain. States the pain occurs in her R foot, knee, and hip, but does not necessarily radiate between them.  She is unable to characterize this pain further as far as burning, stinging, aching, etc.  She states this pain started a few months prior, and has gradually been worsening since that time.  She denies any trauma, but notes a history of R knee replacement in 2009.  States she has a similar pain on LLE, but not as severe.  Gabapentin 300 " "Qdaily was started 1 week prior, does not believe it has offered significant help yet.  She takes norco ~Qdaily for pain relief.    Mood "sometimes you're alright, but sometimes you're not really good."  States she doesn't have enough money.  Endorses difficulty sleeping, loss of interests, feelings of guilt, decreased energy, decreased concentration, states appetite is good.  Denies suicidal/homicidal ideation/plan.    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  Lives alone, has a young maid that she has hired to come help clean a few times per week.  Has two children (live in [place], [place]).  Denies recreational drug use, alcohol use, cigarette use.  She is currently able to maintain all her ADLs at this time.    Review of Systems:  Constitutional: no fever or chills  Eyes: no visual changes  ENT: no nasal congestion or sore throat  Respiratory: no cough or shortness of breath  Cardiovascular: no chest pain or palpitations  Gastrointestinal: no nausea or vomiting.  Endorses abd pain, constipation, dark stools.  Genitourinary: no hematuria or dysuria  Integument/Breast: no rash or pruritis  Musculoskeletal: endorses arthralgias   Neurological: positive for L-sided writhing movements, RLE intense pain  Behavioral/Psych: no auditory or visual hallucinations      Past Medical History  Past Medical History:   Diagnosis Date    Anemia     Arthritis     Hashimoto's disease     Hypertension     IGT (impaired glucose tolerance) 1/12/2016    Joint pain     Multinodular goiter 1/12/2016       Medications    Current Outpatient Prescriptions:     amlodipine (NORVASC) 5 MG tablet, TAKE 1 TABLET DAILY, Disp: 90 tablet, Rfl: 2    ferrous sulfate 325 (65 FE) MG EC tablet, Take 325 mg by mouth 2 (two) times daily. , Disp: , Rfl:     gabapentin (NEURONTIN) 300 MG capsule, Take 1 capsule (300 mg total) by mouth 3 (three) times daily., Disp: 60 capsule, Rfl: " "11    gabapentin (NEURONTIN) 300 MG capsule, Take 1 capsule (300 mg total) by mouth 3 (three) times daily., Disp: 270 capsule, Rfl: 3    hydrocodone-acetaminophen 5-325mg (NORCO) 5-325 mg per tablet, Take 1 tablet by mouth every 24 hours as needed for Pain., Disp: 30 tablet, Rfl: 0    hydroxychloroquine (PLAQUENIL) 200 mg tablet, TAKE 2 TABLETS ONCE DAILY, Disp: 180 tablet, Rfl: 1    hyoscyamine (LEVSIN) 0.125 mg TbDL, Take 1 tablet (0.125 mg total) by mouth every 4 (four) hours as needed for Cramping., Disp: 60 tablet, Rfl: 3    montelukast (SINGULAIR) 10 mg tablet, TAKE 1 TABLET EVERY EVENING, Disp: 90 tablet, Rfl: 2    NEXIUM 40 mg capsule, TAKE 1 CAPSULE DAILY, Disp: 90 capsule, Rfl: 2    predniSONE (DELTASONE) 5 MG tablet, TAKE 2 TABLETS ONCE DAILY, Disp: 180 tablet, Rfl: 0    thiamine 100 MG tablet, Take 1 tablet (100 mg total) by mouth once daily., Disp: , Rfl:   No current facility-administered medications for this visit.     Allergies  Review of patient's allergies indicates:  No Known Allergies    Social History  Social History     Social History    Marital status:      Spouse name: N/A    Number of children: N/A    Years of education: N/A     Occupational History    Not on file.     Social History Main Topics    Smoking status: Never Smoker    Smokeless tobacco: Never Used    Alcohol use No    Drug use: No    Sexual activity: No      Comment: ,  age 52,      Other Topics Concern    Not on file     Social History Narrative    No narrative on file       Family History  Family History   Problem Relation Age of Onset    Hypertension Mother     Prostate cancer Father      prostate cancer    Breast cancer Maternal Grandmother     Lupus Neg Hx     Psoriasis Neg Hx     Melanoma Neg Hx     Colon cancer Neg Hx        Physical Exam  /66   Pulse 100   Ht 5' 5" (1.651 m)   Wt 73.3 kg (161 lb 9.6 oz)   BMI 26.89 kg/m²     General: Well-developed, well-groomed. No " apparent distress  Cardiovascular: Regular rate and rhythm with no murmurs, rubs or gallops.  Chest: Lungs clear to auscultation bilaterally.  No wheezes, stridor, ronchi appreciated.  Abdomen: Normoactive bowel sounds present.  Soft, nontender to palpation.  Extremities: No peripheral edema    Neurologic Exam: The patient is awake, alert and oriented to person/place/time. Language is fluent.  MOCA 21 as below.    Cranial nerves:   Pupils are round and reactive to light and accommodation.   Visual fields are full to confrontation.    Ocular motility is full in all cardinal positions of gaze.   Facial sensation is normal to light touch.   Facial activation is symmetric.   Hearing is symmetric bilaterally.   Palate elevates symmetrically.   Shoulder elevation and strength are decreased on L, associated with shoulder pain  Tongue is midline and neck rotation strength is normal bilaterally.     Motor examination of all extremities demonstrates normal bulk and tone in all four limbs. There are no atrophy or fasciculations. Strength is 5/5 in the upper and lower extremities bilaterally.  She exhibits writhing movements of her LUE and LLE predominantly.  Occasionally she will exhibit similar movements on the R.  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  She is able to briefly suppress these movements, although they seem to worsen afterwards.  During out interview, she also demonstrated occasional L hand resting tremor.      Sensory examination is normal light touch in BUE and BLE.  Romberg is negative.     Deep tendon reflexes are 3+ and symmetric b/l brachioradialis, biceps, triceps.  2+ in achilles b/l.  0 b/l patellar.  Plantar stimulation: L is equivocal, R is downgoing.  No Davis's b/l.     Gait: Normal heel, toe, tandem, and casual gait.    Coordination: L finger/nose demonstrates mild dysmetria.  R Finger to nose and b/l heel to shin testing is normal.      Lab and Test Results    WBC   Date  Value Ref Range Status   08/24/2017 6.97 3.90 - 12.70 K/uL Final   08/03/2017 9.98 3.90 - 12.70 K/uL Final   07/28/2017 8.51 3.90 - 12.70 K/uL Final     Hemoglobin   Date Value Ref Range Status   08/24/2017 11.5 (L) 12.0 - 16.0 g/dL Final   08/03/2017 12.6 12.0 - 16.0 g/dL Final   07/28/2017 12.6 12.0 - 16.0 g/dL Final     Hematocrit   Date Value Ref Range Status   08/24/2017 36.2 (L) 37.0 - 48.5 % Final   08/03/2017 40.1 37.0 - 48.5 % Final   07/28/2017 40.7 37.0 - 48.5 % Final     Platelets   Date Value Ref Range Status   08/24/2017 286 150 - 350 K/uL Final   08/03/2017 255 150 - 350 K/uL Final   07/28/2017 264 150 - 350 K/uL Final     Glucose   Date Value Ref Range Status   08/03/2017 107 70 - 110 mg/dL Final   05/10/2017 76 70 - 110 mg/dL Final   04/13/2016 80 70 - 110 mg/dL Final   04/13/2016 80 70 - 110 mg/dL Final     Sodium   Date Value Ref Range Status   08/03/2017 143 136 - 145 mmol/L Final   05/10/2017 141 136 - 145 mmol/L Final   04/13/2016 139 136 - 145 mmol/L Final   04/13/2016 139 136 - 145 mmol/L Final     Potassium   Date Value Ref Range Status   08/03/2017 4.1 3.5 - 5.1 mmol/L Final   05/10/2017 4.2 3.5 - 5.1 mmol/L Final   04/13/2016 4.0 3.5 - 5.1 mmol/L Final   04/13/2016 4.0 3.5 - 5.1 mmol/L Final     Chloride   Date Value Ref Range Status   08/03/2017 105 95 - 110 mmol/L Final   05/10/2017 101 95 - 110 mmol/L Final   04/13/2016 104 95 - 110 mmol/L Final   04/13/2016 104 95 - 110 mmol/L Final     CO2   Date Value Ref Range Status   08/03/2017 26 23 - 29 mmol/L Final   05/10/2017 31 (H) 23 - 29 mmol/L Final   04/13/2016 26 23 - 29 mmol/L Final   04/13/2016 26 23 - 29 mmol/L Final     BUN, Bld   Date Value Ref Range Status   08/03/2017 15 8 - 23 mg/dL Final   05/10/2017 17 8 - 23 mg/dL Final   04/13/2016 13 8 - 23 mg/dL Final   04/13/2016 13 8 - 23 mg/dL Final     Creatinine   Date Value Ref Range Status   08/03/2017 0.8 0.5 - 1.4 mg/dL Final   06/28/2017 1.0 0.5 - 1.4 mg/dL Final   05/10/2017 0.9  "0.5 - 1.4 mg/dL Final     Calcium   Date Value Ref Range Status   08/03/2017 9.5 8.7 - 10.5 mg/dL Final   05/10/2017 9.6 8.7 - 10.5 mg/dL Final   04/13/2016 9.1 8.7 - 10.5 mg/dL Final   04/13/2016 9.1 8.7 - 10.5 mg/dL Final     Magnesium   Date Value Ref Range Status   02/04/2015 1.9 1.6 - 2.6 mg/dL Final   05/02/2011 2.1 1.6 - 2.6 mg/dl Final     Phosphorus   Date Value Ref Range Status   05/02/2011 3.1 2.7 - 4.5 mg/dl Final     Alkaline Phosphatase   Date Value Ref Range Status   08/03/2017 66 55 - 135 U/L Final   05/10/2017 75 55 - 135 U/L Final   04/13/2016 73 55 - 135 U/L Final     ALT   Date Value Ref Range Status   08/03/2017 10 10 - 44 U/L Final   05/10/2017 7 (L) 10 - 44 U/L Final   04/13/2016 8 (L) 10 - 44 U/L Final     AST   Date Value Ref Range Status   08/03/2017 12 10 - 40 U/L Final   05/10/2017 9 (L) 10 - 40 U/L Final   04/13/2016 12 10 - 40 U/L Final         Images:   8/11/2015 MRI Brain: Per resident review,  Abnormal T2 flair signal in R tatyana.    3/2017 MRI Lumbar Spine: Per resident review,  Severe degenerative changes of Lumbar spine, noting severe canal stenosis of L4-5, L5-S1.    Independently reviewed: Yes    New MRI from 8/29/17: lesion in R caudate head            Assessment and Plan:  1) Hemichorea.  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  Exam unchanged from previous.  2) [facility] - likely vascular given more MRI evidence.  Needs baseline NP testing.    - Valium 2 mg BID   - will try for TBZ approval off label to titrate to 12.5 TID  - OT  - watch memory closely - would get formal NP testing now           Baldomero Giang MD, MPH  Division of Movement and Memory Disorders  [facility]      "

## 2017-08-31 ENCOUNTER — OFFICE VISIT (OUTPATIENT)
Dept: ORTHOPEDICS | Facility: CLINIC | Age: 82
End: 2017-08-31
Payer: MEDICARE

## 2017-08-31 ENCOUNTER — HOSPITAL ENCOUNTER (OUTPATIENT)
Dept: RADIOLOGY | Facility: HOSPITAL | Age: 82
Discharge: HOME | End: 2017-08-31
Attending: ORTHOPAEDIC SURGERY
Payer: MEDICARE

## 2017-08-31 ENCOUNTER — TELEPHONE (OUTPATIENT)
Dept: PHARMACY | Facility: CLINIC | Age: 82
End: 2017-08-31

## 2017-08-31 VITALS — BODY MASS INDEX: 26.84 KG/M2 | HEIGHT: 65 IN | WEIGHT: 161.06 LBS

## 2017-08-31 DIAGNOSIS — M25.551 RIGHT HIP PAIN: ICD-10-CM

## 2017-08-31 DIAGNOSIS — M25.551 RIGHT HIP PAIN: Primary | ICD-10-CM

## 2017-08-31 LAB — LA PPP-IMP: NORMAL

## 2017-08-31 PROCEDURE — 1125F AMNT PAIN NOTED PAIN PRSNT: CPT | Mod: ,,, | Performed by: ORTHOPAEDIC SURGERY

## 2017-08-31 PROCEDURE — 99212 OFFICE O/P EST SF 10 MIN: CPT | Mod: S$PBB,,, | Performed by: ORTHOPAEDIC SURGERY

## 2017-08-31 PROCEDURE — 99999 PR PBB SHADOW E&M-EST. PATIENT-LVL II: CPT | Mod: PBBFAC,,, | Performed by: ORTHOPAEDIC SURGERY

## 2017-08-31 PROCEDURE — 1159F MED LIST DOCD IN RCRD: CPT | Mod: ,,, | Performed by: ORTHOPAEDIC SURGERY

## 2017-08-31 PROCEDURE — 73502 X-RAY EXAM HIP UNI 2-3 VIEWS: CPT | Mod: TC,RT

## 2017-08-31 PROCEDURE — 99212 OFFICE O/P EST SF 10 MIN: CPT | Mod: PBBFAC,25 | Performed by: ORTHOPAEDIC SURGERY

## 2017-08-31 PROCEDURE — 73502 X-RAY EXAM HIP UNI 2-3 VIEWS: CPT | Mod: 26,RT,, | Performed by: RADIOLOGY

## 2017-08-31 NOTE — TELEPHONE ENCOUNTER
Informed patient this is [facility] specialty pharmacy, we have received an order for Tetrabenazine from your provider and will contact you once a benefits investigation is complete with copay information, to set up pickup or shipment, and [facility] consultation.  feel free to give us a call with  any questions prior to hearing back from us at [phone]. thank you!_EMIR 8/31/17

## 2017-08-31 NOTE — PROGRESS NOTES
HPI:    Patient is here today for a chief complaint of right leg pain.  She was worked up for hip and leg pain by Dr. Quinones and he sent her to Dr. Landa for radicular pain,  Dr. Moya recently ordered epidural steroid injections and she declined to get them.  Now she's here to get her hip and leg worked up again.  She has a right total knee that I did in 2011.  She has a left total knee done elsewhere approximately 2013 or 14.  The total knees are not giving her a problem.  She has a lot of problems when she tried to drive her car and positions with shooting leg pains.    Duration: 12 months  Intensity: severe  Character of pain: sharp  Location: She reports that the pain is negative  For groin pain. Pain  positive radiating down the leg.    Past Medical History:   Diagnosis Date    Anemia     Arthritis     Hashimoto's disease     Hypertension     IGT (impaired glucose tolerance) 1/12/2016    Joint pain     Multinodular goiter 1/12/2016          Current Outpatient Prescriptions:     amlodipine (NORVASC) 5 MG tablet, TAKE 1 TABLET DAILY, Disp: 90 tablet, Rfl: 2    diazePAM (VALIUM) 2 MG tablet, Take 1 tablet (2 mg total) by mouth every 6 (six) hours as needed for Anxiety. Trial 1/2 - 1 tab as needed BID PRN, Disp: 30 tablet, Rfl: 1    ferrous sulfate 325 (65 FE) MG EC tablet, Take 325 mg by mouth 2 (two) times daily. , Disp: , Rfl:     gabapentin (NEURONTIN) 300 MG capsule, Take 1 capsule (300 mg total) by mouth 3 (three) times daily., Disp: 60 capsule, Rfl: 11    gabapentin (NEURONTIN) 300 MG capsule, Take 1 capsule (300 mg total) by mouth 3 (three) times daily., Disp: 270 capsule, Rfl: 3    hydrocodone-acetaminophen 5-325mg (NORCO) 5-325 mg per tablet, Take 1 tablet by mouth every 24 hours as needed for Pain., Disp: 30 tablet, Rfl: 0    hydroxychloroquine (PLAQUENIL) 200 mg tablet, TAKE 2 TABLETS ONCE DAILY, Disp: 180 tablet, Rfl: 1    hyoscyamine (LEVSIN) 0.125 mg TbDL, Take 1 tablet (0.125  "mg total) by mouth every 4 (four) hours as needed for Cramping., Disp: 60 tablet, Rfl: 3    montelukast (SINGULAIR) 10 mg tablet, TAKE 1 TABLET EVERY EVENING, Disp: 90 tablet, Rfl: 2    predniSONE (DELTASONE) 5 MG tablet, TAKE 2 TABLETS ONCE DAILY, Disp: 180 tablet, Rfl: 0    NEXIUM 40 mg capsule, TAKE 1 CAPSULE DAILY, Disp: 90 capsule, Rfl: 2    tetrabenazine (XENAZINE) 12.5 mg tablet, Take 1 tablet (12.5 mg total) by mouth once daily., Disp: 90 tablet, Rfl: 5    thiamine 100 MG tablet, Take 1 tablet (100 mg total) by mouth once daily., Disp: , Rfl:      Review of patient's allergies indicates:  No Known Allergies     ROS  Constitutional: Negative for fever, Negative for weight loss  HENT Negative for congestion  Cardiovascular: Negative chest pain  Respiratory: Negative Shortness of breath  Heme: Negative excessive bleeding  Skin:NegativeItching, Negative breakdown  Musculoskeletal:Negative for back pain, Positive for joint pain, Positive muscle pain, Positive muscle weakness  Neurological: Negative for numbness and paresthesias   Psychiatric/Behavioral: Positive altered mental status, Negative for depression    Physical Exam:   Ht 5' 5" (1.651 m)   Wt 73.1 kg (161 lb 0.7 oz)   BMI 26.80 kg/m²   General appearance: This is a well-developed, Well nourished female No obvious acute distress.  Psychiatric: normal mood and affect;  pleasant and conversant; judgment and thought content normal  She has chorea motions.   Patient has antalgic gait to the bilateral  Cardiovascular: There are no swelling or varicosities present.   Respiratory: normal respiratory effort   Lymphatic: no adenopathy     Coordination and Balance: Are very distorted.  Musculoskeletal   Neck    ROM shows normal flexion and extension and lateral rotation    Palpation: Non-tender    Stability is normal    Strength is normal    Skin is normal without masses and lesions    Sensation is intact to light touch     Lumbar Spine:  Lumbar lordosis " present negative                            Lumbar Tenderness positive                            Lumbar Normal Motion negative    Right hip full range of motion positive  Left  Hip full range of motion positive     Both knees have full range of motion and are nontender.   Motor and sensory to lower extremities intact.    Radiograph   Osteoarthritis Lumbar severe  Osteoarthritis Hips    mild      Impression:  Osteoarthritis Lumbar spine.    Plan: Hip and knee exams are very benign.  Suspect radiculopathy.

## 2017-09-01 ENCOUNTER — TELEPHONE (OUTPATIENT)
Dept: RHEUMATOLOGY | Facility: CLINIC | Age: 82
End: 2017-09-01

## 2017-09-01 NOTE — TELEPHONE ENCOUNTER
Complaints of increased pain in her first and fourth finger on the right hand.  There may be some swelling.  This is been going on for a week.  She had no history of trauma.  It is hard for her to tell me if this is similar to any of her previous arthritic flares.  I told her to increase her prednisone to 20 mg daily for 2 days, 10 mg daily for 3 days, then back to 5 mg daily.  If this doesn't help, she is to contact me.

## 2017-09-01 NOTE — TELEPHONE ENCOUNTER
Pt. Reports having pain in left thumb and ring finger X 1 week, with swelling. And pt. Would like to know what to take for pain.

## 2017-09-05 LAB
ACHR BIND AB SER-SCNC: 0 NMOL/L
AMPHIPHYSIN AB TITR SER: NEGATIVE TITER
CV2 IGG TITR SER: NEGATIVE TITER
GLIAL NUC TYPE 1 AB TITR SER: NEGATIVE TITER
HU1 AB TITR SER: NEGATIVE TITER
HU2 AB TITR SER IF: NEGATIVE TITER
HU3 AB TITR SER: NEGATIVE TITER
IMMUNOLOGIST REVIEW: NORMAL
NACHR AB SER-SCNC: 0 NMOL/L
PAVAL REFLEX TEST ADDED: NORMAL
PCA-1 AB TITR SER: NEGATIVE TITER
PCA-2 AB TITR SER: NEGATIVE TITER
PCA-TR AB TITR SER: NEGATIVE TITER
STRIA MUS AB TITR SER: NEGATIVE TITER
VGCC-N BIND AB SER-SCNC: 0 NMOL/L
VGCC-P/Q BIND AB SER-SCNC: 0 NMOL/L
VGKC AB SER-SCNC: 0 NMOL/L

## 2017-09-06 ENCOUNTER — OFFICE VISIT (OUTPATIENT)
Dept: INTERNAL MEDICINE | Facility: CLINIC | Age: 82
End: 2017-09-06
Payer: MEDICARE

## 2017-09-06 VITALS
HEIGHT: 65 IN | WEIGHT: 160.69 LBS | SYSTOLIC BLOOD PRESSURE: 132 MMHG | HEART RATE: 88 BPM | DIASTOLIC BLOOD PRESSURE: 64 MMHG | OXYGEN SATURATION: 98 % | BODY MASS INDEX: 26.77 KG/M2

## 2017-09-06 DIAGNOSIS — R09.89 LYMPH NODE SYMPTOM: ICD-10-CM

## 2017-09-06 DIAGNOSIS — H69.92 EUSTACHIAN TUBE DISORDER, LEFT: Primary | ICD-10-CM

## 2017-09-06 PROCEDURE — 3078F DIAST BP <80 MM HG: CPT | Mod: ,,, | Performed by: INTERNAL MEDICINE

## 2017-09-06 PROCEDURE — 1159F MED LIST DOCD IN RCRD: CPT | Mod: ,,, | Performed by: INTERNAL MEDICINE

## 2017-09-06 PROCEDURE — 99214 OFFICE O/P EST MOD 30 MIN: CPT | Mod: S$PBB,,, | Performed by: INTERNAL MEDICINE

## 2017-09-06 PROCEDURE — 99999 PR PBB SHADOW E&M-EST. PATIENT-LVL III: CPT | Mod: PBBFAC,,, | Performed by: INTERNAL MEDICINE

## 2017-09-06 PROCEDURE — 3075F SYST BP GE 130 - 139MM HG: CPT | Mod: ,,, | Performed by: INTERNAL MEDICINE

## 2017-09-06 PROCEDURE — 99213 OFFICE O/P EST LOW 20 MIN: CPT | Mod: PBBFAC | Performed by: INTERNAL MEDICINE

## 2017-09-06 NOTE — PROGRESS NOTES
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    Chief Complaint: Adenopathy (pt reports that she has a swollen gland on the L side of neck behind L ear. pt mentioned she had a gland removed years ago. 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   She has RA and is on plaquenil, prednisone, hydrocodone followed by Dr. Qureshi.     She has noticed a small nodule behind her left ear since her surgery for left parotid gland in 1984.   For last week it has been hurting and tender.     Ears getting clogged.     Hemichorea seeing Dr. Giang. 2/2 stroke r claudate. Likely vascular [facility].   Trial tetrabenazine    Review of Systems   Constitutional: Negative for fever.   Respiratory: Negative for shortness of breath.    Cardiovascular: Negative for chest pain.   Musculoskeletal: Negative.    Skin: Negative.    Neurological:        Uncontrollable left leg movements       Objective:   /64 (BP Location: Right arm, Patient Position: Sitting, BP Method: Medium (Manual))   Pulse 88   Ht 5' 5" (1.651 m)   Wt 72.9 kg (160 lb 11.5 oz)   SpO2 98%   BMI 26.74 kg/m²      Physical Exam   Constitutional: She is oriented to person, place, and time. She appears well-developed and well-nourished. No distress.   HENT:   Head: Normocephalic and atraumatic.   Left ear with 3mm auricular lymph node. Left ear canal with non-osbtructive cerumen, tm nl   Cardiovascular: Normal rate and regular rhythm.    Pulmonary/Chest: Effort normal. No respiratory distress. She has no wheezes. She has no rales.   Musculoskeletal:   Chorea movements of left side especially lower     Neurological: She is alert and oriented to person, place, and time.   Skin: Skin is warm and dry. She is not diaphoretic.   Psychiatric: She has a normal mood and affect. Her " behavior is normal.       Assessment:       1. Eustachian tube disorder, left    2. Lymph node symptom        Plan:       Selma was seen today for adenopathy and arthritis.    Diagnoses and all orders for this visit:    Eustachian tube disorder, left  Lymph node symptom  Reassurance given regarding the lymph node which is small and, per her report unchanged,.   Ear sx appear to be due to eustacian tube dysfunction. Will have her take zyrtec OTC and nasal saline for this. If sx persist let us know.       Hemichorea  Due to stroke  Managed by Dr. Giang    RA  Managed by rheumatology

## 2017-09-07 ENCOUNTER — HOSPITAL ENCOUNTER (EMERGENCY)
Facility: OTHER | Age: 82
Discharge: HOME | End: 2017-09-07
Attending: EMERGENCY MEDICINE
Payer: MEDICARE

## 2017-09-07 VITALS
BODY MASS INDEX: 26.66 KG/M2 | TEMPERATURE: 98 F | WEIGHT: 160 LBS | RESPIRATION RATE: 16 BRPM | OXYGEN SATURATION: 95 % | HEIGHT: 65 IN | HEART RATE: 86 BPM | DIASTOLIC BLOOD PRESSURE: 89 MMHG | SYSTOLIC BLOOD PRESSURE: 147 MMHG

## 2017-09-07 DIAGNOSIS — R10.9 ABDOMINAL PAIN OF UNKNOWN ETIOLOGY: Primary | ICD-10-CM

## 2017-09-07 LAB
ALBUMIN SERPL BCP-MCNC: 3.6 G/DL
ALP SERPL-CCNC: 66 U/L
ALT SERPL W/O P-5'-P-CCNC: 9 U/L
ANION GAP SERPL CALC-SCNC: 12 MMOL/L
AST SERPL-CCNC: 15 U/L
BASOPHILS # BLD AUTO: 0.02 K/UL
BASOPHILS NFR BLD: 0.2 %
BILIRUB SERPL-MCNC: 0.7 MG/DL
BILIRUB UR QL STRIP: NEGATIVE
BUN SERPL-MCNC: 12 MG/DL
CALCIUM SERPL-MCNC: 9.7 MG/DL
CHLORIDE SERPL-SCNC: 105 MMOL/L
CLARITY UR: CLEAR
CO2 SERPL-SCNC: 25 MMOL/L
COLOR UR: YELLOW
CREAT SERPL-MCNC: 0.9 MG/DL
DIFFERENTIAL METHOD: ABNORMAL
EOSINOPHIL # BLD AUTO: 0.3 K/UL
EOSINOPHIL NFR BLD: 2.7 %
ERYTHROCYTE [DISTWIDTH] IN BLOOD BY AUTOMATED COUNT: 14.1 %
EST. GFR  (AFRICAN AMERICAN): >60 ML/MIN/1.73 M^2
EST. GFR  (NON AFRICAN AMERICAN): >60 ML/MIN/1.73 M^2
GLUCOSE SERPL-MCNC: 106 MG/DL
GLUCOSE UR QL STRIP: NEGATIVE
HCT VFR BLD AUTO: 40.5 %
HGB BLD-MCNC: 12.6 G/DL
HGB UR QL STRIP: NEGATIVE
KETONES UR QL STRIP: NEGATIVE
LEUKOCYTE ESTERASE UR QL STRIP: NEGATIVE
LIPASE SERPL-CCNC: 17 U/L
LYMPHOCYTES # BLD AUTO: 2.5 K/UL
LYMPHOCYTES NFR BLD: 22.8 %
MCH RBC QN AUTO: 26.8 PG
MCHC RBC AUTO-ENTMCNC: 31.1 G/DL
MCV RBC AUTO: 86 FL
MONOCYTES # BLD AUTO: 1.7 K/UL
MONOCYTES NFR BLD: 15 %
NEUTROPHILS # BLD AUTO: 6.5 K/UL
NEUTROPHILS NFR BLD: 59 %
NITRITE UR QL STRIP: NEGATIVE
PH UR STRIP: 6 [PH] (ref 5–8)
PLATELET # BLD AUTO: 278 K/UL
PMV BLD AUTO: 9.4 FL
POTASSIUM SERPL-SCNC: 3.6 MMOL/L
PROT SERPL-MCNC: 8 G/DL
PROT UR QL STRIP: NEGATIVE
RBC # BLD AUTO: 4.71 M/UL
SODIUM SERPL-SCNC: 142 MMOL/L
SP GR UR STRIP: <=1.005 (ref 1–1.03)
URN SPEC COLLECT METH UR: ABNORMAL
UROBILINOGEN UR STRIP-ACNC: NEGATIVE EU/DL
WBC # BLD AUTO: 11.02 K/UL

## 2017-09-07 PROCEDURE — 85025 COMPLETE CBC W/AUTO DIFF WBC: CPT

## 2017-09-07 PROCEDURE — 80053 COMPREHEN METABOLIC PANEL: CPT

## 2017-09-07 PROCEDURE — 96360 HYDRATION IV INFUSION INIT: CPT

## 2017-09-07 PROCEDURE — 99283 EMERGENCY DEPT VISIT LOW MDM: CPT | Mod: 25

## 2017-09-07 PROCEDURE — 96361 HYDRATE IV INFUSION ADD-ON: CPT

## 2017-09-07 PROCEDURE — 83690 ASSAY OF LIPASE: CPT

## 2017-09-07 PROCEDURE — 81003 URINALYSIS AUTO W/O SCOPE: CPT

## 2017-09-07 PROCEDURE — 25000003 PHARM REV CODE 250: Performed by: EMERGENCY MEDICINE

## 2017-09-07 RX ORDER — DICYCLOMINE HYDROCHLORIDE 20 MG/1
20 TABLET ORAL 2 TIMES DAILY
Qty: 20 TABLET | Refills: 0 | Status: SHIPPED | OUTPATIENT
Start: 2017-09-07 | End: 2017-09-25 | Stop reason: SDUPTHER

## 2017-09-07 RX ADMIN — SODIUM CHLORIDE 1000 ML: 0.9 INJECTION, SOLUTION INTRAVENOUS at 03:09

## 2017-09-07 NOTE — ED NOTES
Pt instructed the need for urine specimen. Pt unable to at this time and will notify RN staff when ready to provide urine specimen.

## 2017-09-07 NOTE — ED NOTES
"Pt to ED with c/o generalized abdominal pain in all 4 quadrants, stating "it hurts all over, upper and lower." Pt reporting she usually has this chronic abdominal pain reporting "this pain is different because it has been non stop since yesterday." Pt appears restless. Pt denies SOB, diarrhea, or vomiting. Describes pain as constant, unaffected by eating. Pt AAOx4 and appropriate at this time. Respirations even and unlabored. No acute distress noted. Pt reports pain 10/10. MD notified. Awaiting further orders. Pt updated on POC. Bed is locked and in lowest position with side rails up x2. Call bell within reach and pt oriented to use of call bell. Pt on continuous pulse ox, and continuous BP cuff. Will continue to monitor.     "

## 2017-09-07 NOTE — ED PROVIDER NOTES
"Encounter Date: 9/7/2017    SCRIBE #1 NOTE: I, Shawn Tia, am scribing for, and in the presence of, Dr. Silva.       History     Chief Complaint   Patient presents with    Abdominal Pain     + constant generalzied abdominal pains rated 10/10 on pain scale. " I had the runs last night after taking a few laxatives" Denies chest pain, SOB, N/V, fever or chills     2:15 PM    Patient is a 81 year old female who presents to the ED with complaint of abdominal pain. She reports chronic abdominal pain for the past two months, but states the pain suddnely became much worse last night, "like an attack." Sever pain has been constant since last night. Pain is worse with movement, but not related to eating. She reports irregular bowel movements, and notes taking laxatives, having diarrhea, then not having any bowel movements the following day. She denies any nausea, vomiting, fever, or urinary symptoms. She has seen Dr. Lopez and Dr. River (gastroenterology), but states there had no definitve way to get better." She states "I don't ever go the ER, but I had to today for this pain." She has no history of gall bladder, liver, or pancreas problems, CAD, or cardiac stents. She has no history of abdominal surgeries. She reports a "polyp" was found on colonoscopy performed two years ago. She has arthritis. She is taking prednisone. She admits to very occasional use of alcohol, and denies use of tobacco or illicit drugs.       The history is provided by the patient.     Review of patient's allergies indicates:  No Known Allergies  Past Medical History:   Diagnosis Date    Anemia     Arthritis     Hashimoto's disease     Hypertension     IGT (impaired glucose tolerance) 1/12/2016    Joint pain     Multinodular goiter 1/12/2016     Past Surgical History:   Procedure Laterality Date    CATARACT EXTRACTION      COLONOSCOPY N/A 9/29/2015    Procedure: COLONOSCOPY;  Surgeon: FIDELINA Sanchez MD;  Location: [facility] ([address] [place] [address]);  " Service: Endoscopy;  Laterality: N/A;    JOINT REPLACEMENT      right knee    KNEE SURGERY Left 12/31/2013    TKR    left parotidectomy      mixed tumor    SALIVARY GLAND SURGERY      TONSILLECTOMY       Family History   Problem Relation Age of Onset    Hypertension Mother     Prostate cancer Father      prostate cancer    Breast cancer Maternal Grandmother     Lupus Neg Hx     Psoriasis Neg Hx     Melanoma Neg Hx     Colon cancer Neg Hx      Social History   Substance Use Topics    Smoking status: Never Smoker    Smokeless tobacco: Never Used    Alcohol use No     Review of Systems   Constitutional: Negative for chills and fever.   HENT: Negative for congestion.    Respiratory: Negative for shortness of breath.    Cardiovascular: Negative for chest pain.   Gastrointestinal: Positive for abdominal pain and diarrhea (after laxative). Negative for nausea and vomiting.   Genitourinary: Negative for difficulty urinating, dysuria, frequency and hematuria.   Musculoskeletal: Negative for back pain.   Skin: Negative for rash.   Neurological: Negative for weakness.   Psychiatric/Behavioral: Negative for confusion.       Physical Exam     Initial Vitals [09/07/17 1350]   BP Pulse Resp Temp SpO2   (!) 142/69 97 16 97.6 °F (36.4 °C) 99 %      MAP       93.33         Physical Exam    Nursing note and vitals reviewed.  Constitutional: She appears well-developed and well-nourished.   Anxious. Frequent jerking movement of left upper extremity.    HENT:   Head: Normocephalic and atraumatic.   Moist mucous membranes.   Eyes: Conjunctivae and EOM are normal.   No pallor or icterus.   Neck: Normal range of motion. Neck supple.   Cardiovascular: Normal rate, regular rhythm, normal heart sounds and intact distal pulses. Exam reveals no friction rub.    No murmur heard.  Pulmonary/Chest: Breath sounds normal. No respiratory distress. She has no wheezes. She has no rhonchi. She has no rales.   Abdominal: Soft. There is  tenderness (diffuse mild tenderness). There is no rebound and no guarding.   Musculoskeletal: Normal range of motion. She exhibits no edema.   Neurological: She is alert and oriented to person, place, and time. She has normal strength.   Skin: Skin is warm and dry. Capillary refill takes less than 2 seconds. No erythema. No pallor.   Psychiatric: Thought content normal.   Emotionally labile.         ED Course   Procedures  Labs Reviewed   CBC W/ AUTO DIFFERENTIAL - Abnormal; Notable for the following:        Result Value    MCH 26.8 (*)     MCHC 31.1 (*)     Mono # 1.7 (*)     All other components within normal limits   COMPREHENSIVE METABOLIC PANEL - Abnormal; Notable for the following:     ALT 9 (*)     All other components within normal limits   URINALYSIS - Abnormal; Notable for the following:     Specific Gravity, UA <=1.005 (*)     All other components within normal limits   LIPASE             Medical Decision Making:   Clinical Tests:   Lab Tests: Ordered and Reviewed  ED Management:  4:50 PM - Patient is feeling better. No further episodes of stomach pain. Discussed findings with her. She has follow up appointment with gastroenterology, but requests alternate physician to follow up with.             Scribe Attestation:   Scribe #1: I performed the above scribed service and the documentation accurately describes the services I performed. I attest to the accuracy of the note.    Attending Attestation:           Physician Attestation for Scribe:  Physician Attestation Statement for Scribe #1: I, Dr. Silva, reviewed documentation, as scribed by Shawn Weber in my presence, and it is both accurate and complete.                 ED Course      pt presents complaining of abdominal pain.  The patient reports severe cramping pain earlier today that is now improved upon arrival.  Nausea without vomiting.  The patient has follow-up with colorectal surgery, has apparently been referred to gastroenterology, maybe  initial visit and has follow-up appointment scheduled the patient did endorse some displeasure at being seen by physician's assistant.  She also endorses dissatisfaction with the hospital system in general and similar notes are made in prior visits.  I have reviewed the old charts and the patient has had the with seems like extensive and appropriate workup.  IBS has been considered but no definitive diagnosis has yet been made upon evaluation here and throughout her stay the patient appears comfortable.  Laboratory studies do not show any remarkable findings consideration was given to perform a CAT scan however CHART REVIEWED PATIENT HAD A RECENT MRI OF THE ABDOMEN.  Therefore I see little benefit in further imaging with a CAT scan the patient said did request referral to alternate GI and will be given information from [facility] the patient has not had significant relief with Levsin therefore we will give trial of Bentyl.  Stable for discharge.  Urged follow-up with her primary care and GI as planned.  Return to the emergency department in the interim for new/worsening symptoms    Clinical Impression:     1. Abdominal pain of unknown etiology                               Dev Silva II, MD  09/07/17 3235

## 2017-09-08 ENCOUNTER — OFFICE VISIT (OUTPATIENT)
Dept: ORTHOPEDICS | Facility: CLINIC | Age: 82
End: 2017-09-08
Payer: MEDICARE

## 2017-09-08 ENCOUNTER — INITIAL CONSULT (OUTPATIENT)
Dept: NEUROLOGY | Facility: CLINIC | Age: 82
End: 2017-09-08
Payer: MEDICARE

## 2017-09-08 DIAGNOSIS — R41.3 MEMORY CHANGE: ICD-10-CM

## 2017-09-08 DIAGNOSIS — G31.84 MCI (MILD COGNITIVE IMPAIRMENT): ICD-10-CM

## 2017-09-08 DIAGNOSIS — F06.70 MILD VASCULAR NEUROCOGNITIVE DISORDER: ICD-10-CM

## 2017-09-08 DIAGNOSIS — M54.16 LUMBAR RADICULOPATHY: ICD-10-CM

## 2017-09-08 DIAGNOSIS — I99.9 MILD VASCULAR NEUROCOGNITIVE DISORDER: ICD-10-CM

## 2017-09-08 DIAGNOSIS — M48.061 SPINAL STENOSIS, LUMBAR: Primary | ICD-10-CM

## 2017-09-08 DIAGNOSIS — F43.21 ADJUSTMENT DISORDER WITH DEPRESSED MOOD: ICD-10-CM

## 2017-09-08 PROCEDURE — 1159F MED LIST DOCD IN RCRD: CPT | Mod: ,,, | Performed by: ORTHOPAEDIC SURGERY

## 2017-09-08 PROCEDURE — 96119 PR NEUROPSYCH TESTING BY TECHNICIAN: CPT | Mod: 59,S$PBB,, | Performed by: CLINICAL NEUROPSYCHOLOGIST

## 2017-09-08 PROCEDURE — 96118 PR NEUROPSYCH TESTING BY PSYCH/PHYS: CPT | Mod: S$PBB,,, | Performed by: CLINICAL NEUROPSYCHOLOGIST

## 2017-09-08 PROCEDURE — 99499 UNLISTED E&M SERVICE: CPT | Mod: S$PBB,,, | Performed by: CLINICAL NEUROPSYCHOLOGIST

## 2017-09-08 PROCEDURE — 99212 OFFICE O/P EST SF 10 MIN: CPT | Mod: S$PBB,,, | Performed by: ORTHOPAEDIC SURGERY

## 2017-09-08 PROCEDURE — 90791 PSYCH DIAGNOSTIC EVALUATION: CPT | Mod: PBBFAC | Performed by: CLINICAL NEUROPSYCHOLOGIST

## 2017-09-08 PROCEDURE — 90791 PSYCH DIAGNOSTIC EVALUATION: CPT | Mod: S$PBB,,, | Performed by: CLINICAL NEUROPSYCHOLOGIST

## 2017-09-08 PROCEDURE — 96119 PR NEUROPSYCH TESTING BY TECHNICIAN: CPT | Mod: PBBFAC | Performed by: CLINICAL NEUROPSYCHOLOGIST

## 2017-09-08 PROCEDURE — 96118 PR NEUROPSYCH TESTING BY PSYCH/PHYS: CPT | Mod: PBBFAC | Performed by: CLINICAL NEUROPSYCHOLOGIST

## 2017-09-08 NOTE — PROGRESS NOTES
Outpatient Neuropsychological Evaluation      Referral Information  Name: Selma Alonzo Lux MD  MRN: [id number]  BEJARANO: 2017  : 1935  Age: 81 y.o.  Handedness: Right  Race: Black or   Gender: female  Referring Provider: Baldomero Giang Md  [address] [place] [address]  Referral Reason/Medical Necessity: Patient has multiple medical concerns listed below along with history of a subcortical stroke and resultant hemichorea. She has reported ongoing problems with cognition - particularly memory trouble and disorganization. She was referred for a neuropsychological evaluation by Neurology to characterize her cognitive status, for differential diagnosis, and for treatment recommendations.   Billing:Total licensed neuropsychologists professional time includes: clinical interview (83819: 60-minutes), test administration and interpretation of tests administered by the billing neuropsychologist, integration of test results and other clinical data, preparing the final report, and personally reporting results to the patient ([id number])= 3 hours. Total technician time (44927) = 2 hours.   Consent: The patient expressed an understanding of the purpose of the evaluation and consented to all procedures.    Current Symptoms/HPI  Cognitive Sxs:  · Attention: Denied any problems or changes  · Mental Speed:Denied any problems or changes  · Memory: Reports some mild forgetfulness, but attributes to disorganization.  · Language:Denied any problems or changes  · Visuospatial/Perceptual:Denied any problems or changes  · Executive Functioning: Reports being more disorganized than she has in the past.     [Onset/Course]: Patient had significant difficulty characterizing her cognitive sxs in an organized/linear manner. With multiple redirections, she was able to describe a lifelong pattern of disorganization that has been worse in the last few years. She reported that she was previously able to  "compensate for her disorganization, but does not feel she can compensate as well now. Her memory, resultantly, has also been affected such that she has been slightly more forgetful. She is unsure of any specific triggering event and does not think her cognitive sxs are progressively worsening.    Neuropsychiatric Sxs:  · Mood: Some depression in the last few years due to reduced social support, stress of remodeling her house and limited finances, and adjusting to aging. Not a big worrier.   · Neurovegetative:  · Sleep: Unclear how long it takes her to fall asleep, but notices increased time to fall asleep; get up a few times to urinate; feels fairly rested when she wakes up.  · Appetite: Normal  · Energy: "I'm okay"  · Behavioral Concerns: Intermittent temper flares, but this is longstanding per patient  · Delusions: None  · Hallucinations: None  · SI/HI: Denied    [Onset/Course]: N/A    Physical  · Motor: Unclear when her choreiform/ballistic movements started. Feels it may have started months ago or possibly years ago.   · Sensory: No major changes or problems  · Pain: Some hip pain but none right now    Current Functioning (I/ADLs):  · ADLs: Independent  · IADLs: Independent  · Finances: No difficulties with management  · Medication Mgmt: No difficulties with management  · Driving: She drives mostly short distances  · Household Mgmt: Cooks for herself and has a       Family History   Problem Relation Age of Onset    Hypertension Mother     Prostate cancer Father      prostate cancer    Breast cancer Maternal Grandmother     Lupus Neg Hx     Psoriasis Neg Hx     Melanoma Neg Hx     Colon cancer Neg Hx      Family Neurologic History: Late life memory trouble (Mother in her mid 90s) +   Family Psychiatric History: Alcoholic (brother)    Developmental/Academic Hx:  Developmental: No gestational or later developmental concerns.  Academic:  · Learning Difficulties: None  · Attention Difficulties: " None  · Behavioral Difficulties: None  · Educational Attainment: HS ([employer]) + [employer] (Pre-Med and performed) + Long first 2-years and transferred to [facility] for her MD + Residency in [place]     Social/Occupational Hx:  Social:  · Current Relationship/Family Status:  to first  for 20 years and  in ; had 2 children with first  (58yo and 53yo who live in [place] and [place]); remarried to second  and he  in ;   · Primary Source of Support: Has some social support, but not as much as she would like.   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  · [id number]: Worked in Primary Care at [employer] and gradually phased out part-time for a few years    MEDICAL HISTORY  Patient Active Problem List   Diagnosis    Carpal tunnel syndrome on right    Carpal tunnel syndrome on left    Hashimoto's disease    Osteoarthritis of lumbar spine    DJD (degenerative joint disease) of cervical spine    Osteoarthritis of left knee    s/p Right Total knee replacement    Osteoarthritis of both hips    Osteoarthritis of sacroiliac joints    Scoliosis of thoracic spine    Trochanteric bursitis of left hip    Anemia    PUD (peptic ulcer disease)    Fatty liver    Leiomyoma of uterus, unspecified    Macular edema    Retinal macroaneurysm of left eye    Branch retinal vein occlusion, left eye    Cystoid macular edema, left eye    Seropositive rheumatoid arthritis of multiple sites    Primary osteoarthritis involving multiple joints    Multinodular goiter    Hypovitaminosis D    Hypertension, essential    Long QT interval    Keratoconjunctivitis sicca of both eyes not specified as Sjogren's    Pneumococcal vaccine refused    Frequency of urination    Nocturia    Plaquenil  causing adverse effect in therapeutic use    Rectal bleeding    Long term (current) use of systemic steroids    Chronic bilateral low back pain without sciatica    Abnormality of gait and mobility    IBS (irritable bowel syndrome)    Neuropathy    Hemichorea    Mild cognitive impairment     Past Medical History:   Diagnosis Date    Anemia     Arthritis     Hashimoto's disease     Hypertension     IGT (impaired glucose tolerance) 1/12/2016    Joint pain     Multinodular goiter 1/12/2016     Past Surgical History:   Procedure Laterality Date    CATARACT EXTRACTION      COLONOSCOPY N/A 9/29/2015    Procedure: COLONOSCOPY;  Surgeon: FIDELINA Sanchez MD;  Location: [address]);  Service: Endoscopy;  Laterality: N/A;    JOINT REPLACEMENT      right knee    KNEE SURGERY Left 12/31/2013    TKR    left parotidectomy      mixed tumor    SALIVARY GLAND SURGERY      TONSILLECTOMY         Neurologic History  · TBI: None  · Seizures: None  · Stroke: Yes, but silent  · Movement Disorder: Yes, recently by neurology who noted choreiform movement likely 2/2 lacune in caudate head      Lab Results   Component Value Date    ARIDGEHJ98 1124 (H) 08/24/2017     Lab Results   Component Value Date    RPR Non-reactive 08/24/2017     No results found for: FOLATE  Lab Results   Component Value Date    TSH 0.775 08/24/2017    G5KBFXI 5.8 07/07/2014     Lab Results   Component Value Date    HGBA1C 5.5 08/24/2017     Lab Results   Component Value Date    ENJ58PMKX Negative 08/24/2017       Neurodiagnostics  Comparison: MRI 08/11/2015.    Findings:    No acute infarction.  Small punctate remote infarcts noted within the right centrum semiovale, left anterior thalamus, right sultana-tatyana and bilateral cerebellar hemispheres.  Prominent patchy T2/FLAIR signal hyperintensity noted throughout the supratentorial periventricular white matter and tatyana, nonspecific but most suggestive of age advanced chronic microvascular  "ischemic changes.  There is mild generalized cerebral volume loss.  No midline shift or mass effect.  No hydrocephalus.  No enhancing mass lesions.  No midline shift or mass effect.  Intracranial T2 flow voids are present. Sellar regions is unremarkable. Cerebellar tonsils are in their expected location. Cervicomedullary junctions is normal.     Paranasal sinuses and mastoids are clear.  No marrow replacement process.  Orbits are within normal limits.   Impression         No acute intracranial abnormalities.  Specifically, no acute infarction or enhancing mass lesions.    Punctate remote infarct within the right centrum semiovale, left anterior thalamus and right sultana-tatyana.    Age advanced chronic microvascular ischemic changes.      Electronically signed by: CARMELLA BOONE MD  Date: 08/30/17  Time: 00:05        Per Dr. Giang, " reviewed the MRI -  I think the R caudate nucleus is the site of the pathology (has a black hole from old "super-lacune")"      Current Outpatient Prescriptions:     amlodipine (NORVASC) 5 MG tablet, TAKE 1 TABLET DAILY, Disp: 90 tablet, Rfl: 2    diazePAM (VALIUM) 2 MG tablet, Take 1 tablet (2 mg total) by mouth every 6 (six) hours as needed for Anxiety. Trial 1/2 - 1 tab as needed BID PRN, Disp: 30 tablet, Rfl: 1    dicyclomine (BENTYL) 20 mg tablet, Take 1 tablet (20 mg total) by mouth 2 (two) times daily., Disp: 20 tablet, Rfl: 0    ferrous sulfate 325 (65 FE) MG EC tablet, Take 325 mg by mouth 2 (two) times daily. , Disp: , Rfl:     gabapentin (NEURONTIN) 300 MG capsule, Take 1 capsule (300 mg total) by mouth 3 (three) times daily., Disp: 60 capsule, Rfl: 11    hydrocodone-acetaminophen 5-325mg (NORCO) 5-325 mg per tablet, Take 1 tablet by mouth every 24 hours as needed for Pain., Disp: 30 tablet, Rfl: 0    hydroxychloroquine (PLAQUENIL) 200 mg tablet, TAKE 2 TABLETS ONCE DAILY, Disp: 180 tablet, Rfl: 1    hyoscyamine (LEVSIN) 0.125 mg TbDL, Take 1 tablet (0.125 mg total) by " "mouth every 4 (four) hours as needed for Cramping., Disp: 60 tablet, Rfl: 3    montelukast (SINGULAIR) 10 mg tablet, TAKE 1 TABLET EVERY EVENING, Disp: 90 tablet, Rfl: 2    NEXIUM 40 mg capsule, TAKE 1 CAPSULE DAILY, Disp: 90 capsule, Rfl: 2    predniSONE (DELTASONE) 5 MG tablet, TAKE 2 TABLETS ONCE DAILY, Disp: 180 tablet, Rfl: 0    tetrabenazine (XENAZINE) 12.5 mg tablet, Take 1 tablet (12.5 mg total) by mouth once daily., Disp: 90 tablet, Rfl: 5    thiamine 100 MG tablet, Take 1 tablet (100 mg total) by mouth once daily., Disp: , Rfl:   No current facility-administered medications for this visit.     Psychiatric Hx:  · Childhood: None  · Adult:  · 18-30: No sxs or major pxs  · 30-50: No major pxs  · 50-70: Possibly some stress, but she had difficulty describing in a linear/coherent way  · 70s: Increase in depression anxiety in the last 5-6 years attributed to various stressors  · Substance Use: Very little alcohol;     Social History     Social History Main Topics    Smoking status: Never Smoker    Smokeless tobacco: Never Used    Alcohol use No    Drug use: No    Sexual activity: No      Comment: ,  age 52,      MENTAL STATUS AND OBSERVATIONS:  APPEARANCE: Casually dressed and adequate grooming/hygiene.   ALERTNESS/ORIENTATION: Attentive and alert. Fully oriented (x5) to time and place  GAIT: Slow and aided with a cane; initially, she was brought in with a wheelchair and the walked in my office  MOTOR MOVEMENTS/MANNERISMS: left-sided sultana-choreiform/ballistic movements; typically occurred continually with some intermittent pauses.  SPEECH/LANGUAGE: Normal in rate, rhythm, tone, and volume. No significant word finding difficulty noted. Expressive and receptive language was normal.  STATED MOOD/AFFECT: The patients stated mood was "okay." Affect was congruent with stated mood.   INTERPERSONAL BEHAVIOR: Rapport was quickly and easily established   SUICIDALITY/HOMICIDALITY: " Denied  HALLUCINATIONS/DELUSIONS: None evidenced or endorsed  THOUGHT PROCESSES: Thoughts were often disjointed and she had difficulty providing a linear history; she was often digressive and sometimes tangential requiring redirection  TEST TAKING BEHAVIOR and VALIDITY: Freestanding and embedded performance validity measures and observation of effort were suggestive of adequate engagement. The current results, therefore, are likely a valid reflection of the patient's current functioning.     PROCEDURES/TESTS ADMINISTERED:  In addition to performing a review of pertinent medical records, reviewing limits to confidentiality, conducting a clinical interview, and explaining procedures, the following measures were administered: Mini-Mental State Examination (MMSE; [place] et al., 1993 norms); Wide Range Achievement Test - Fourth Edition (WRAT-IV; Green Form) Reading Test; William-15 with Recognition; Wechsler Adult Intelligence Scale-IV (Digit Span); Trail Making Test, parts A and B (Elaine et al., 2004 norms); [place] Naming Test (BNT-60; Elaine et al., 2004 norms) Category and Letter-cued verbal fluency (animal naming/FAS; Elaine et al., 2004 norms); Todd Verbal Learning Test - Revised (Form-1); Brief Visuospatial Memory Test- Revised (BVMT); WMS-IV Logical Memory (Older Adult Battery); Clock Drawing; SDMT; [place] Card Sorting Test-64; Grooved Pegboard Test (Elaine et al., 2004 norms); Geriatric Depression Scale (GDS-30); JOSE ALFERDO-7.  Manual norms were used unless otherwise indicated.Manual norms were used unless otherwise indicated.      TEST RESULTS  PERFORMANCE VALIDITY  RDS...................................8 / Valid  William-15 with Recognition..............29 / Valid      Percentile Interpretation:        </=3rd......................................Abnormal        4th-9th.....................................Borderline Abnormal        10th-24th...................................Low Average         25th-74th...................................Average        75th-90th...................................High Average        91st-97th...................................Superior        >/=97th.....................................Very Superior        SCREENING OF GENERAL COGNITIVE FUNCTIONING:    MMSE (total score/%ile):     Total Score (max = 30)...............27 / Impaired  Orientation to time..................5 / 5  Orientation to place.................5 / 5  Recall...............................0 /3        ESTIMATED FSIQ and READING LEVEL:      WRAT-4 (Raw/SS/%ile)..................65 / 117/87        AUDITORY ATTENTION AND WORKING MEMORY    HBVJ-VX-Evabr Span        Forward raw..........................10 / 50th      Forward span.........................6 /       Backward raw.........................5 / 9th      Backward span........................3 /       Sequencing raw.......................4 / 5th      Sequencing span......................3 /       Overall (SS/percentile)..............8 / 25th            MOTOR AND ORAL PROCESSING SPEED     Trail Making Test (sec. to completion/percentile):        Part A .....................................96 / <1st          Errors..................................1 /             SDMT (total correct/percentile):        Oral Form...................................22 / 1st      Written Form................................24 / 4th        MOTOR FUNCTIONS    Grooved Pegboard (sec. to completion/%ile)        Dominant (Right) Hand.......................121 / 10th      Non-dominant (Left) Hand....................300 / 1st      LANGUAGE FUNCTIONING    WORD PRODUCTIVITY    Verbal Fluency Tests (raw/percentiles):        FAS.........................................31 / 24th      Animals.....................................18 / 66th      CONFRONTATION NAMING    [place] Naming Test (raw/percentile)        Total Spontaneous (max. = 60)...............51/ 24th      CONSTRUCTIONAL PRAXIS      Clock Drawing:        Request (max. = 5)...........................2 / Impaired      Copy (max. = 5)..............................3 / Impaired  BVMT-R-Copy (max. = 12).........................12 /  WNL      NONVERBAL LEARNING AND MEMORY    Brief Visuospatial Memory Test - Revised (raw score/percentile):        Form: 1        Trial 1......................................4 /       Trial 2......................................6 /       Trial 3......................................8 /       Total Recall................................18 / 34th      Delayed Recall...............................6 / 24th      Recognition:            Hits.....................................6 /           False-Positives..........................0 /           Discrimination Index.....................6 /       VERBAL LEARNING AND MEMORY OF PROSE PASSAGES    WMS-IV (raw score/percentile):        Logical Memory I.............................35 / 75th      Logical Memory II............................28 / 91st      Recognition..................................16 / 26-50th        VERBAL LEARNING AND MEMORY OF A WORDLIST    Todd Verbal Learning Test - Revised (raw score/percentile):        Form: 1        Trial 1......................................4 /       Trial 2......................................8 /       Trial 3......................................10 /       Total Recall.................................22 / 46th      Delayed Recall...............................7 / 34th      Recognition:            Hits.....................................12 /           False-Positives..........................3 /           Discrimination Index.....................9 / 24th      EXECUTIVE FUNCTIONING    WCST-64 (raw/%ile):        Categories Completed.........................1 / 6-10th      Total Errors.................................35 / 5th      Perseverative Errors.........................21 / 5th      Perseverative  Responses......................28 / 2nd      Trials to 1st................................12 / >16th      Failure to Maintain Set......................0 / >16th          [place] Making Test (sec. to completion/%ile):        Part B ......................................300 / <1st          Errors...................................0 /       SELF-REPORTED MOOD  JOSE ALFREDO-7...........................................0 / No sig anxiety  GDS.............................................0 / No Depression      OVERALL SUMMARY  Patient has multiple medical concerns listed below along with history of a subcortical stroke and resultant hemichorea. She has reported ongoing problems with cognition - particularly memory trouble and disorganization. She was referred for a neuropsychological evaluation by Neurology to characterize her cognitive status, for differential diagnosis, and for treatment recommendations.     The patient's baseline or pre-morbid intellectual functioning was estimated to be in the high-average to superior range based on educational/occupational history and performance on a word reading measure. Results should be interpreted in that context. Global mental status was impaired (MMSE=27/30) particularly for recall. The remainder of her mental status (e.g., orientation) was fully intact. Basic attention was normal range, but more complex attention and working memory were borderline impaired. Mental speed was quite slow in the impaired range. Motor speed was impaired bilaterally with the left side being worse than her right. Basic expressive and receptive language was normal, but she had difficulty linearly describing her history likely 2/2 executive dysfunction. Object naming was lower than expected. Verbal fluency showed normal range semantic fluency and low average phonemic fluency. Basic visuoperception was normal and more complex constructional praxis was impaired.     Her ability to learn and recall information was  largely normal. However, she was less efficient on a memory measure with heavier executive demands and performed much better on a memory measure with reduced executive functioning demands. Recognition cues were helpful at improving recall. Executive or frontal-lobe functions were largely impaired for problem solving, set-shifting, and organization/planning when drawing a clock.     Psychiatrically, she reports minimal problems aside from longstanding irritability and some mild depression 2/2 various stressors.     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 Her profile revealed predominant frontal-subcortical cognitive dysfunction (e.g., slowed processing speed, marked executive dysfunction, working memory difficulties). Based on imaging findings and test data, her prior caudate head lacunar infarct is the most probable reason for her cognitive dysfunction. The caudate head has several white matter connections to the frontal lobe and strategic caudate head infarcts can result in the type of motor/cognitive sxs she is experiencing. The following recommendations are listed to assist with her care:    Referral Dx:  G31.84 (ICD-10-CM) - MCI (mild cognitive impairment)    Consult Dx:  Mild Vascular Neurocognitive Disorder  --predominant frontal-subcortical dysfunction   Adjustment Disorder with Depression     JAGDEEP Lagos II, Ph.D.  Clinical Neuropsychologist  [facility] - Department of Neurology    RECOMMENDATIONS/TREATMENT PLAN  1. Follow Up Recommendations:  a. Neurology Follow-up: Continued Neurology follow-up as recommended by Ms. Ro neurologist.  b. Medical Follow-up: Continued primary care follow-up as recommended by Ms. Ro treatment providers. In Particular, improved vascular health is important and I will discuss with her.  c. Driving Evaluation: Based on these findings it is recommended that Ms. Lux undergo a formal, on-the-road driving evaluation. This evaluation can be  "completed at the [facility] with Jonathan Cervantes. If interested, I can place a referral and the family can contact Nadja Yu at [phone] for scheduling.  d. Audiology Evaluation: Consultation with audiology is recommended to document current hearing abilities and provide accommodation/treatment, as needed.  e. Rehabilitation: Referral for Physical Therapy, Occupational Therapy, and Speech/Language Therapy is recommended to address ongoing physical and cognitive symptoms.  f. Occupational Therapy: An in-home assessment by Occupational Therapy is recommended (if not completed already) to assess Ms. Ro current environment and provide recommendations for modification/accommodation.  g. Supervision: Although full supervision is not recommended, she should have more regular assistance and supervision from her daughters to ensure no problems with medication management and finances.   h. Neuropsychology: Neuropsychological reevaluation is recommended in 12 months following implementation of recommendations.    2. Recommendations for Dr. Lux:  i. Cognitive Recommendations:  i. Repetition, structure and routine are known to help with cognitive difficulties and can maximize current functioning. Therefore, it is recommended that Ms. Lux establish set routines for activities including medication administration, eating, household chores, and errands. This repetition and routine will reduce cognitive demands and associated confusion.  ii. Designate a specific place in the home (e.g., a "memory table" or a memory bowl) for easily lost items such as a wallet, glasses, or keys. This can help to reduce the frequency with which such items are misplaced and the frustration with being unable to locate lost items.  iii. Make sure that important information is not communicated in a distracting environment. For example, if important information is being conveyed, make sure it is done in an environment free of " noise or distracting activity such that she cannot fully attend to the information.   iv. Dont attempt to multi-task.  Separate tasks so that each can be completed one at a time.  v. Break down large projects into smaller tasks and write down the steps to completing the task.  Taking notes while reading can help with recall.  vi. Using multiple modalities (e.g., listening, writing notes, asking questions, recording) to learn new information is likely to allow additional time for processing, thus improving memory for the material.   vii. Allowing sufficient time to complete tasks will reduce frustration and help to ensure completion.  a. Practice good cognitive hygiene:  i. Engage in regular exercise, which increases alertness and arousal and can improve attention and focus.  Consider lower impact exercises, such as yoga or light walking.  ii. Get a good nights sleep, as this can enhance alertness and cognition.  iii. Eat healthy foods and balanced meals. It is notable that research indicates certain nutrients may aid in brain function, such as B vitamins (especially B6, B12, and folic acid), antioxidants (such as vitamins C and E, and beta carotene), and Omega-3 fatty acids. Talk with your physician or nutritionist about whats right for you.   iv. Keep your brain active. Find activities to stay mentally active, such as reading, games (cards, checkers), puzzles (crosswords, Sudoku, jig saw), crafts (models, woodworking), gardening, or participating in activities in the community.  v. Stay socially engaged. Continue staying active with your family and friends.

## 2017-09-11 ENCOUNTER — TELEPHONE (OUTPATIENT)
Dept: NEUROLOGY | Facility: CLINIC | Age: 82
End: 2017-09-11

## 2017-09-11 NOTE — TELEPHONE ENCOUNTER
----- Message from Puneet Garcia sent at 9/11/2017  1:29 PM CDT -----  Contact: Self @ Self @ [phone]   Pt would like to reschedule appt on 09/27 to sometime next week so her daugther can come to the visit with her. The doctor is fully committed until 09/27. Pls call if pt can be seen the week of 09/18.

## 2017-09-11 NOTE — TELEPHONE ENCOUNTER
----- Message from Puneet MONTES Jose sent at 9/11/2017  1:23 PM CDT -----  Contact: Self @ [phone]   Pt states the doctor was suppose to special order tetrabenazine (XENAZINE) 12.5 mg tablet, but pt hasn't received anything and says she's been without the medication for 10 days. Pls call.

## 2017-09-12 ENCOUNTER — TELEPHONE (OUTPATIENT)
Dept: NEUROLOGY | Facility: CLINIC | Age: 82
End: 2017-09-12

## 2017-09-12 NOTE — PROGRESS NOTES
The patient returns for follow up.    She has a history of RLE radiculopathy and although I had previously ordered an ERIN this was not done.    She has also developed L sided hemichorea.    Today we discussed options, she would like to proceed with an epidural. She has severe L4/5 stenosis.    I have ordered a Right L4/5 TFESI.    I spent 10 minutes with the patient of which greater than 1/2 the time was devoted to counciling the patient regarding treatment options.

## 2017-09-12 NOTE — TELEPHONE ENCOUNTER
Hi Dr Giang and staff,    For Selma Lux--upon reviewing her medication list- it states she is on Plaqenil which interacts with Tetrabenazine- QT prolongation.. In her chart she already had long QT interval (see note from 6/29/17)  Also her chart shows she is on Norco and diazepam-- both increase CNS depression with Tetrabenazine    There interaction is a Category X interaction- see below for Lexicomp.    Title Highest Risk QTc-Prolonging Agents / Hydroxychloroquine    Risk Rating X: Avoid combination    Summary Hydroxychloroquine may enhance the QTc-prolonging effect of Highest Risk QTc-Prolonging Agents. Severity Major Reliability Rating Fair: Multiple reported cases, but no apparent mechanism or confirmatory studies    Patient Management Avoid concomitant use of hydroxychloroquine and drugs known to prolong the QTc interval.    Highest Risk QTc-Prolonging Agents Interacting Members Ajmaline, Amiodarone, Anagrelide, Arsenic Trioxide, Artemether, Asenapine, Astemizole, Bepridil, Cisapride, Citalopram, Deutetrabenazine, Disopyramide, Dofetilide, Domperidone, Dosulepin, Dronedarone, Eliglustat, FLUoxetine, Flupentixol, Halofantrine, Ibutilide, Iloperidone, Lopinavir, Lumefantrine, MiFEPRIStone, Nilotinib, Paliperidone, Pimavanserin, Pimozide, Pipamperone [INT], Procainamide, QUEtiapine, QuiNIDine, QuiNINE, Radotinib, Ribociclib, Sotalol, Sparfloxacin, Sulpiride, Terfenadine, Tetrabenazine, Thioridazine, Toremifene, Vandetanib, Vemurafenib, Vernakalant, Ziprasidone, Zuclopenthixol    Discussion Several case reports demonstrate an association between hydroxychloroquine use and QT interval prolongation and/or torsades de pointes.1,2,3,4,5 Reported cases have been described in patients who took an intentional overdose of hydroxychloroquine,1 in patients taking multiple quinine derivatives simultaneously (ie, hydroxychloroquine, quinine sulfate, and tonic water containing quinine),2 and in patients with altered  hydroxychloroquine elimination due to underlying renal or hepatic impairment.3,4,5     The hydroxychloroquine product labeling recommends avoiding the use of hydroxychloroquine in combination with other drugs that prolong the QT interval due to the increased risk of QT-interval prolongation and/or torsades de pointes. 6 Patients with renal or hepatic dysfunction may be at a higher risk for these potentially life-threatening toxicities.    Please advise how you'd like to proceed with all of the about. Thank you!    Olamide Wright, Pharm.D  Specialty Pharmacy Clinical Pharmacist  [facility]  Phone: [phone]

## 2017-09-14 ENCOUNTER — NURSE TRIAGE (OUTPATIENT)
Dept: ADMINISTRATIVE | Facility: CLINIC | Age: 82
End: 2017-09-14

## 2017-09-14 ENCOUNTER — TELEPHONE (OUTPATIENT)
Dept: GASTROENTEROLOGY | Facility: CLINIC | Age: 82
End: 2017-09-14

## 2017-09-14 NOTE — TELEPHONE ENCOUNTER
Pt missed her [facility] appt this morning as a follow up from the ER. Pt states that she is still in pain.  sent a message to [facility]astro MD to see if they could fit her in today. Pt advised that if she is still in severe pain then she would need to go back to ER    Reason for Disposition   SEVERE abdominal pain (e.g., excruciating)    Protocols used: ST ABDOMINAL PAIN - FEMALE-A-OH

## 2017-09-14 NOTE — TELEPHONE ENCOUNTER
----- Message from Eda Gaston sent at 9/14/2017 12:44 PM CDT -----  Contact: Pt can be reached at [phone]  Pt is calling to speak with the nurse, pt is in so much pain pt is crying that she missed her appt.      Thank you!

## 2017-09-19 ENCOUNTER — OFFICE VISIT (OUTPATIENT)
Dept: NEUROLOGY | Facility: CLINIC | Age: 82
End: 2017-09-19
Payer: MEDICARE

## 2017-09-19 DIAGNOSIS — F06.70 MILD VASCULAR NEUROCOGNITIVE DISORDER: Primary | ICD-10-CM

## 2017-09-19 DIAGNOSIS — I99.9 MILD VASCULAR NEUROCOGNITIVE DISORDER: Primary | ICD-10-CM

## 2017-09-19 PROCEDURE — 99499 UNLISTED E&M SERVICE: CPT | Mod: S$PBB,,, | Performed by: CLINICAL NEUROPSYCHOLOGIST

## 2017-09-19 NOTE — PROGRESS NOTES
Neuropsychology Feedback Note    Date of Session: 2017  Session Content: Results and recommendations were discussed at length with the patient and daughter. We reviewed test findings and next steps. They are going to explore the followin. Possible psychostimulant for executive dysfunction with Dr. Rubio/Padmaja  2. Seek an OT eval  3. Discontinue driving  4. Possibly psychotherapy with Jeniffer Corea  5. Review social/mental activity and seek some help organizing     No further neuropsychology feedback needed.

## 2017-09-20 ENCOUNTER — HOSPITAL ENCOUNTER (OUTPATIENT)
Facility: OTHER | Age: 82
Discharge: HOME | End: 2017-09-20
Attending: ANESTHESIOLOGY | Admitting: ANESTHESIOLOGY
Payer: MEDICARE

## 2017-09-20 ENCOUNTER — SURGERY (OUTPATIENT)
Age: 82
End: 2017-09-20

## 2017-09-20 VITALS
HEIGHT: 65 IN | HEART RATE: 75 BPM | RESPIRATION RATE: 18 BRPM | SYSTOLIC BLOOD PRESSURE: 167 MMHG | WEIGHT: 160 LBS | DIASTOLIC BLOOD PRESSURE: 77 MMHG | OXYGEN SATURATION: 97 % | TEMPERATURE: 98 F | BODY MASS INDEX: 26.66 KG/M2

## 2017-09-20 DIAGNOSIS — G89.29 CHRONIC PAIN: ICD-10-CM

## 2017-09-20 DIAGNOSIS — M51.36 DDD (DEGENERATIVE DISC DISEASE), LUMBAR: Primary | ICD-10-CM

## 2017-09-20 PROCEDURE — 99152 MOD SED SAME PHYS/QHP 5/>YRS: CPT | Mod: ,,, | Performed by: ANESTHESIOLOGY

## 2017-09-20 PROCEDURE — 64483 NJX AA&/STRD TFRM EPI L/S 1: CPT | Performed by: ANESTHESIOLOGY

## 2017-09-20 PROCEDURE — 25000003 PHARM REV CODE 250: Performed by: ANESTHESIOLOGY

## 2017-09-20 PROCEDURE — 25500020 PHARM REV CODE 255: Performed by: ANESTHESIOLOGY

## 2017-09-20 PROCEDURE — 25000003 PHARM REV CODE 250: Performed by: PHYSICAL MEDICINE & REHABILITATION

## 2017-09-20 PROCEDURE — 63600175 PHARM REV CODE 636 W HCPCS: Performed by: ANESTHESIOLOGY

## 2017-09-20 PROCEDURE — 64483 NJX AA&/STRD TFRM EPI L/S 1: CPT | Mod: RT,,, | Performed by: ANESTHESIOLOGY

## 2017-09-20 RX ORDER — BUPIVACAINE HYDROCHLORIDE 2.5 MG/ML
INJECTION, SOLUTION EPIDURAL; INFILTRATION; INTRACAUDAL
Status: DISCONTINUED | OUTPATIENT
Start: 2017-09-20 | End: 2017-09-20 | Stop reason: HOSPADM

## 2017-09-20 RX ORDER — SODIUM CHLORIDE 9 MG/ML
500 INJECTION, SOLUTION INTRAVENOUS CONTINUOUS
Status: DISCONTINUED | OUTPATIENT
Start: 2017-09-20 | End: 2017-09-20 | Stop reason: HOSPADM

## 2017-09-20 RX ORDER — LIDOCAINE HYDROCHLORIDE 10 MG/ML
INJECTION INFILTRATION; PERINEURAL
Status: DISCONTINUED | OUTPATIENT
Start: 2017-09-20 | End: 2017-09-20 | Stop reason: HOSPADM

## 2017-09-20 RX ORDER — MIDAZOLAM HYDROCHLORIDE 1 MG/ML
INJECTION INTRAMUSCULAR; INTRAVENOUS
Status: DISCONTINUED | OUTPATIENT
Start: 2017-09-20 | End: 2017-09-20 | Stop reason: HOSPADM

## 2017-09-20 RX ORDER — METHYLPREDNISOLONE ACETATE 80 MG/ML
INJECTION, SUSPENSION INTRA-ARTICULAR; INTRALESIONAL; INTRAMUSCULAR; SOFT TISSUE
Status: DISCONTINUED | OUTPATIENT
Start: 2017-09-20 | End: 2017-09-20 | Stop reason: HOSPADM

## 2017-09-20 RX ADMIN — IOHEXOL 5 ML: 300 INJECTION, SOLUTION INTRAVENOUS at 11:09

## 2017-09-20 RX ADMIN — BUPIVACAINE HYDROCHLORIDE 10 ML: 2.5 INJECTION, SOLUTION EPIDURAL; INFILTRATION; INTRACAUDAL; PERINEURAL at 11:09

## 2017-09-20 RX ADMIN — METHYLPREDNISOLONE ACETATE 80 MG: 80 INJECTION, SUSPENSION INTRA-ARTICULAR; INTRALESIONAL; INTRAMUSCULAR; SOFT TISSUE at 11:09

## 2017-09-20 RX ADMIN — LIDOCAINE HYDROCHLORIDE 10 ML: 10 INJECTION, SOLUTION INFILTRATION; PERINEURAL at 11:09

## 2017-09-20 RX ADMIN — SODIUM CHLORIDE 500 ML: 900 INJECTION, SOLUTION INTRAVENOUS at 11:09

## 2017-09-20 RX ADMIN — MIDAZOLAM HYDROCHLORIDE 2 MG: 1 INJECTION, SOLUTION INTRAMUSCULAR; INTRAVENOUS at 11:09

## 2017-09-20 NOTE — H&P
"HPI  81 year old here for right L4 and L5 TFESI under fluroscopic guidance with conscious sedation. No changes in location or distribution of pain.     No recent infections or dental procedures. No blood thinners.       PMHx, PSHx, Allergies, Medications reviewed in epic    ROS negative except pain complaints in HPI    OBJECTIVE:    BP (!) 161/76 (BP Location: Left arm, Patient Position: Lying)   Pulse 82   Temp 97.6 °F (36.4 °C) (Oral)   Resp 18   Ht 5' 5" (1.651 m)   Wt 72.6 kg (160 lb)   SpO2 98%   Breastfeeding? No   BMI 26.63 kg/m²     PHYSICAL EXAMINATION:    GENERAL: Well appearing, in no acute distress, alert and oriented x3.  PSYCH:  Mood and affect appropriate.  SKIN: Skin color, texture, turgor normal, no rashes or lesions.  CV: RRR with palpation of the radial artery.  PULM: No evidence of respiratory difficulty, symmetric chest rise. Clear to auscultation.  NEURO: Cranial nerves grossly intact.    Plan:    Proceed with procedure as planned    Bhargav Colby  09/20/2017    "

## 2017-09-20 NOTE — OP NOTE
INFORMED CONSENT: The procedure, risks, benefits and options were discussed with patient. There are no contraindications to the procedure. The patient expressed understanding and agreed to proceed. The personnel performing the procedure was discussed.    09/20/2017    Surgeon: Joselin Valdez MD    Assistants:   Bhargav Colby MD, PGY-5, Pain Fellow  I was present and supervising all critical portions of the procedure      PROCEDURE:  Right  L4  TRANSFORAMINAL EPIDURAL STEROID INJECTION    Pre Procedure diagnosis:   Right  L4  Lumbar radiculopathy [M54.16]  Spinal stenosis, unspecified spinal region [M48.00]    Post-Procedure diagnosis:   same    Complications: None    Specimens: None    Sedation: None    DESCRIPTION OF PROCEDURE: The patient was brought to the procedure room. IV access was obtained prior to the procedure. The patient was positioned prone on the fluoroscopy table. Continuous hemodynamic monitoring was initiated including blood pressure, EKG, and pulse oximetry. . The skin was prepped with chlorhexidine and draped in a sterile fashion. Skin anesthesia was achieved using a total of 5mL of lidocaine over the respective injection site.     The Right L4  transforaminal space was identified with fluoroscopy in the  AP, oblique, and lateral views.  A 22 gauge spinal quinke needle was then advanced into the area of the trans foraminal space with confirmation of proper needle position using AP, oblique, and lateral fluoroscopic views. Once the needle tip was in the area of the transforaminal space, and there was no blood, CSF or paraesthesias,  1.5 mL of Omnipaque 300mg/ml was injected.  Fluoroscopic imaging in the AP and lateral views revealed a clear outline of the spinal nerve with proximal spread of agent through the neural foramen into the epidural space. A total combination of 1 mL of Bupivicaine 0.25% and 80 mg depo medrol was injected with displacement of the contrast dye confirming that the  medication went into the area of the transforaminal space. A sterile dressing was applied.   Patient tolerated the procedure well.    Conscious sedation provided by M.D    The patient was monitored with continuous pulse oximetry, EKG, and intermittent blood pressure monitors.  The patient was hemodynamically stable throughout the entire process was responsive to voice, and breathing spontaneously.  Supplemental O2 was provided at 2L/min via nasal cannula.  Patient was comfortable for the duration of the procedure. (See nurse documentation and case log for sedation time)    There was a total of 2mg IV Midazolam was titrated for the procedure    Patient was taken back to the recovery room for further observation.     The patient was discharged to home in stable condition

## 2017-09-20 NOTE — DISCHARGE SUMMARY
Discharge Note  Short Stay      SUMMARY     Admit Date: 9/20/2017    Attending Physician: Joselin Valdez    Discharge Diagnosis: Lumbar radiculopathy [M54.16]  Spinal stenosis, unspecified spinal region [M48.00]    Discharge Physician: Joselin Valdez      Discharge Date: 9/20/2017 11:36 AM     PROCEDURE:  Right  L4  TRANSFORAMINAL EPIDURAL STEROID INJECTION    Pre Procedure diagnosis:   Right  L4  Lumbar radiculopathy [M54.16]    Disposition: Home or self care    Patient Instructions:   Current Discharge Medication List      CONTINUE these medications which have NOT CHANGED    Details   amlodipine (NORVASC) 5 MG tablet TAKE 1 TABLET DAILY  Qty: 90 tablet, Refills: 2    Associated Diagnoses: Essential hypertension      diazePAM (VALIUM) 2 MG tablet Take 1 tablet (2 mg total) by mouth every 6 (six) hours as needed for Anxiety. Trial 1/2 - 1 tab as needed BID PRN  Qty: 30 tablet, Refills: 1      dicyclomine (BENTYL) 20 mg tablet Take 1 tablet (20 mg total) by mouth 2 (two) times daily.  Qty: 20 tablet, Refills: 0      ferrous sulfate 325 (65 FE) MG EC tablet Take 325 mg by mouth 2 (two) times daily.       gabapentin (NEURONTIN) 300 MG capsule Take 1 capsule (300 mg total) by mouth 3 (three) times daily.  Qty: 60 capsule, Refills: 11      hydrocodone-acetaminophen 5-325mg (NORCO) 5-325 mg per tablet Take 1 tablet by mouth every 24 hours as needed for Pain.  Qty: 30 tablet, Refills: 0      hydroxychloroquine (PLAQUENIL) 200 mg tablet TAKE 2 TABLETS ONCE DAILY  Qty: 180 tablet, Refills: 1      hyoscyamine (LEVSIN) 0.125 mg TbDL Take 1 tablet (0.125 mg total) by mouth every 4 (four) hours as needed for Cramping.  Qty: 60 tablet, Refills: 3      montelukast (SINGULAIR) 10 mg tablet TAKE 1 TABLET EVERY EVENING  Qty: 90 tablet, Refills: 2      predniSONE (DELTASONE) 5 MG tablet TAKE 2 TABLETS ONCE DAILY  Qty: 180 tablet, Refills: 0      tetrabenazine (XENAZINE) 12.5 mg tablet Take 1 tablet (12.5 mg total) by mouth once  daily.  Qty: 90 tablet, Refills: 5      thiamine 100 MG tablet Take 1 tablet (100 mg total) by mouth once daily.             Resume home diet and activity

## 2017-09-20 NOTE — DISCHARGE INSTRUCTIONS
Home Care Instructions Pain Management:    1. DIET:   You may resume your normal diet today.   2. BATHING:   You may shower with luke warm water. No soaking in tub.  3. DRESSING:   You may remove your bandage today.   4. ACTIVITY LEVEL:   You may resume your normal activities 24 hrs after your procedure.  5. MEDICATIONS:   You may resume your normal medications today.   6. SPECIAL INSTRUCTIONS:   No heat to the injection site for 24 hrs including, bath or shower, heating pad, moist heat, or hot tubs.    Use ice pack to injection site for any pain or discomfort.  Apply ice packs for 20 minute intervals as needed.   If you have received any sedatives by mouth today you may not drive for 12 hours.    If you have received any sedation through your IV, you may not drive for 24 hrs.     PLEASE CALL YOUR DOCTOR IF:  1. Redness or swelling around the injection site.  2. Fever of 101 degrees  3. Drainage (pus) from the injection site.  4. For any continuous bleeding (some dried blood over the incision is normal.)  5. For severe headache that is relieved when lying flat.    FOR EMERGENCIES:   If any unusual problems or difficulties occur during clinic hours, call [phone] or 166.   Procedural Sedation  Procedural sedation is medicine to ease discomfort, pain, and anxiety during a procedure. The medicine is often given through an intravenous (IV) line in your arm or hand. In some cases, the medicine may be taken by mouth or inhaled. While you are under sedation, you will likely be awake. But you may not remember anything afterward.  Why procedural sedation is used  Sedation is used for many types of procedures. The goal is to reduce pain, anxiety, and stressful memories of a procedure. It can also help your health care provider treat you. For example, having a broken bone fixed may be easier if you feel relaxed.  Procedural sedation is used only for short, basic procedures. It is not used for complex surgeries. Some  procedures that use this type of sedation include:  · Dental surgery  · Breast biopsy, to take a sample of breast tissue  · Endoscopy, to look at gastrointestinal problems  · Bronchoscopy, to check for lung problems  · Bone or joint realignment, to fix a broken bone or dislocated joint  · Minor foot or skin surgery  · Electrical cardioversion, to restore a normal heart rhythm  · Lumbar puncture, to assess neurological disease  Risks of procedural sedation  Procedural sedation has some risks and possible side effects, such as:  · Headache  · Nausea and vomiting  · Unpleasant memory of the procedure  · Lowered rate of breathing  · Changes in heart rate and blood pressure (rare)  · Inhalation of stomach contents into your lungs (rare)  Side effects will likely go away shortly after the procedure. Your health care team will watch your heart rate and breathing during your sedation. This is to help prevent problems.  Your own risks may vary based on your age and your overall health. They also depend on the type of sedation you are given. Talk with your health care provider about the risks that apply most to you.  Getting ready for procedural sedation  Talk with your health care provider how to get ready for your procedure. Tell him or her about all the medicines you take. This includes over-the-counter medicines such as ibuprofen. It also includes vitamins, herbs, and other supplements. You may need to stop taking some medicines before the procedure, such as blood thinners and aspirin. If you smoke, you may need to stop. Talk with your health care provider if you need help to stop smoking.  Tell your health care provider if you:  · Have had any problems in the past with sedation or anesthesia  · Have had any recent changes in your health, such as an infection or fever  · Are pregnant or think you may be pregnant  Also, make sure to:  · Ask a family member or friend to take you home after the procedure. You cannot drive on  the day you receive sedation.  · Not eat or drink after midnight the night before your procedure, if advised.  · Follow all other instructions from your health care provider.  During your procedural sedation  You may have your procedure in a hospital or a medical clinic. Sedation is done by a trained health care provider. In general, you can expect the following:  · You will be given medicine through an IV line in your arm or hand. Or you may receive a shot. The medicine may also be given by mouth. Or you may inhale it through a mask.  · If you receive medicine through an IV, you may feel the effects very quickly. You will start to feel relaxed and drowsy.  · During the procedure, your heart rate, breathing, and blood pressure will be closely watched. Your breathing and blood pressure may decrease a little. But you will likely not need help with your breathing. You may receive a little extra oxygen through a mask.  · You will probably be awake the entire time. If you do fall asleep, you should be easy to wake up, if needed. You should feel little or no pain.  · When your procedure is over, the sedative medicine will be stopped.  After your procedural sedation  You will begin to feel more awake and aware. But you will likely be drowsy for a while afterward. You will be closely watched as you become more alert. You may have a faint memory of the procedure. Or you may not remember it at all.  You should be able to return home within an hour or two after your procedure. Plan to have someone stay with you for a few hours. Side effects such as headache and nausea may go away quickly. Tell your health care provider if they continue.  Dont drive or make any important decisions for at least 24 hours. Be sure to follow all after-care instructions.      When to call your health care provider  Have someone call your health care provider right away if you have any of these:  · Drowsiness that gets worse  · Weakness or dizziness  that gets worse  · Repeated vomiting  · You cant be awakened   Date Last Reviewed: 2/6/2015  © 3748-4575 [employer]. [address] [place] [address], [place] [address]. All rights reserved. This information is not intended as a substitute for professional medical care. Always follow your healthcare professional's instructions.

## 2017-09-25 ENCOUNTER — OFFICE VISIT (OUTPATIENT)
Dept: GASTROENTEROLOGY | Facility: CLINIC | Age: 82
End: 2017-09-25
Payer: MEDICARE

## 2017-09-25 VITALS
WEIGHT: 157.88 LBS | HEART RATE: 86 BPM | HEIGHT: 65 IN | BODY MASS INDEX: 26.3 KG/M2 | DIASTOLIC BLOOD PRESSURE: 67 MMHG | SYSTOLIC BLOOD PRESSURE: 146 MMHG

## 2017-09-25 DIAGNOSIS — R10.9 ABDOMINAL PAIN, UNSPECIFIED LOCATION: Primary | ICD-10-CM

## 2017-09-25 PROCEDURE — 99999 PR PBB SHADOW E&M-EST. PATIENT-LVL III: CPT | Mod: PBBFAC,,, | Performed by: INTERNAL MEDICINE

## 2017-09-25 PROCEDURE — 3078F DIAST BP <80 MM HG: CPT | Mod: ,,, | Performed by: INTERNAL MEDICINE

## 2017-09-25 PROCEDURE — 99214 OFFICE O/P EST MOD 30 MIN: CPT | Mod: S$PBB,,, | Performed by: INTERNAL MEDICINE

## 2017-09-25 PROCEDURE — 1125F AMNT PAIN NOTED PAIN PRSNT: CPT | Mod: ,,, | Performed by: INTERNAL MEDICINE

## 2017-09-25 PROCEDURE — 3077F SYST BP >= 140 MM HG: CPT | Mod: ,,, | Performed by: INTERNAL MEDICINE

## 2017-09-25 PROCEDURE — 1159F MED LIST DOCD IN RCRD: CPT | Mod: ,,, | Performed by: INTERNAL MEDICINE

## 2017-09-25 PROCEDURE — 99213 OFFICE O/P EST LOW 20 MIN: CPT | Mod: PBBFAC,PN | Performed by: INTERNAL MEDICINE

## 2017-09-25 RX ORDER — DICYCLOMINE HYDROCHLORIDE 20 MG/1
20 TABLET ORAL EVERY 6 HOURS PRN
Qty: 30 TABLET | Refills: 0 | OUTPATIENT
Start: 2017-09-25 | End: 2017-09-25 | Stop reason: SDUPTHER

## 2017-09-25 RX ORDER — DICYCLOMINE HYDROCHLORIDE 20 MG/1
20 TABLET ORAL EVERY 6 HOURS PRN
Qty: 360 TABLET | Refills: 0 | OUTPATIENT
Start: 2017-09-25 | End: 2017-12-24

## 2017-09-25 NOTE — PROGRESS NOTES
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    Chief Complaint: Abdominal Pain and Bloated    This is an 83yo female who presents complaining of abdominal pain. She has abdominal pain chronically and had been dx'd with IBS, though her symptoms more recently are somewhat different and significant only worsen.  She describes it as a diffuse abdominal distention which is severe, nonradiating.  It can be worsened with food intake but also can improve with food intake.  No particular dietary relation.  She has chronic constipation though recently has also had 1 or 2 episodes of incontinence.  No other exacerbating or relieving factors or other associated symptoms.  No dark urine, jaundice, flushing, fever.  A colonoscopy in 2015 and 1 diminutive colon polyp which was removed.  She does have a history of peptic ulcer disease as well and has had endoscopies over 3 years ago.  She denies NSAID usage.  She does take narcotics as needed.  Dicyclomine has been the most helpful medication treating this particular abdominal discomfort.  No vomiting, nausea.  She has okay oral intake.  Her friend is here to give additional history.    The following portions of the patient's history were reviewed and updated as appropriate: allergies, current medications, past family history, past medical history, past social history, past surgical history and problem list.    (Portions of this note were dictated using voice recognition software and may contain dictation related errors in spelling/grammar/syntax not found on text review)    HPI  Review of Systems   Constitutional: Negative for fever and unexpected weight change.   HENT: Negative for postnasal drip and trouble swallowing.    Cardiovascular: Negative for chest pain and leg swelling.   Gastrointestinal: Positive for abdominal distention and abdominal pain.       Objective:      Physical Exam   Constitutional: She is oriented to person, place, and time.  She appears well-developed and well-nourished. No distress.   HENT:   Head: Normocephalic and atraumatic.   Eyes: Conjunctivae are normal. No scleral icterus.   Pulmonary/Chest: Effort normal and breath sounds normal. No respiratory distress. She has no wheezes.   Abdominal: Soft. Bowel sounds are normal. She exhibits no distension. There is no tenderness.   Neurological: She is alert and oriented to person, place, and time.   Skin: Skin is warm and dry. No rash noted. She is not diaphoretic. No erythema.   Nursing note and vitals reviewed.      Labs; reviewed  Imaging; mri abd/pelvis without acute changes  Assessment:       1. Abdominal pain, unspecified location        Plan:   1. Unclear etiology at this time. Imaging/labs not revealing. Fair response to anti-spasmodics. Symptoms not entirely c/w IBS or OIC. Would schedule bentyl tid, qid prn. Given abnormalities on prior egd, I think it's worthwhile to repeat

## 2017-09-25 NOTE — LETTER
September 25, 2017      Keenan Riojas MD  [address]  [facility] [address]           [facility]  [address] [facility]  [place] [address]  Phone: [phone]          Patient: Selma Alonzo Lux MD   MR Number: [id number]   YOB: 1935   Date of Visit: 9/25/2017       Dear Dr. Keenan Riojas:    Thank you for referring Selma Lux to me for evaluation. Attached you will find relevant portions of my assessment and plan of care.    If you have questions, please do not hesitate to call me. I look forward to following Selma Lux along with you.    Sincerely,    Mg Morton MD    Enclosure  CC:  No Recipients    If you would like to receive this communication electronically, please contact [email or web address] or [phone] to request more information on [employer] Link access.    For providers and/or their staff who would like to refer a patient to [facility], please contact us through our one-stop-shop provider referral line, [facility], at [phone].    If you feel you have received this communication in error or would no longer like to receive these types of communications, please e-mail [email or web address]

## 2017-09-26 ENCOUNTER — TELEPHONE (OUTPATIENT)
Dept: GASTROENTEROLOGY | Facility: CLINIC | Age: 82
End: 2017-09-26

## 2017-09-26 NOTE — TELEPHONE ENCOUNTER
----- Message from Nicolasa Brink sent at 9/25/2017  5:30 PM CDT -----  Contact: [phone]/self  Patient requesting to speak with you regarding medication dicyclomine (BENTYL) 20 mg tablet. Please advise.

## 2017-09-26 NOTE — TELEPHONE ENCOUNTER
Bentyl prescription called into pharmacy.  Prescription was no received electronically.  Patient informed.

## 2017-09-27 ENCOUNTER — OFFICE VISIT (OUTPATIENT)
Dept: NEUROLOGY | Facility: CLINIC | Age: 82
End: 2017-09-27
Payer: MEDICARE

## 2017-09-27 VITALS
DIASTOLIC BLOOD PRESSURE: 71 MMHG | BODY MASS INDEX: 26.16 KG/M2 | HEIGHT: 65 IN | HEART RATE: 76 BPM | SYSTOLIC BLOOD PRESSURE: 117 MMHG | WEIGHT: 157 LBS

## 2017-09-27 DIAGNOSIS — G25.5 HEMICHOREA: Primary | ICD-10-CM

## 2017-09-27 PROCEDURE — 99999 PR PBB SHADOW E&M-EST. PATIENT-LVL IV: CPT | Mod: PBBFAC,GC,, | Performed by: STUDENT IN AN ORGANIZED HEALTH CARE EDUCATION/TRAINING PROGRAM

## 2017-09-27 PROCEDURE — 99214 OFFICE O/P EST MOD 30 MIN: CPT | Mod: S$PBB,GC,, | Performed by: STUDENT IN AN ORGANIZED HEALTH CARE EDUCATION/TRAINING PROGRAM

## 2017-09-27 PROCEDURE — 99214 OFFICE O/P EST MOD 30 MIN: CPT | Mod: PBBFAC | Performed by: STUDENT IN AN ORGANIZED HEALTH CARE EDUCATION/TRAINING PROGRAM

## 2017-09-27 RX ORDER — DIAZEPAM 2 MG/1
2 TABLET ORAL EVERY 6 HOURS PRN
Qty: 30 TABLET | Refills: 1 | Status: SHIPPED | OUTPATIENT
Start: 2017-09-27 | End: 2017-11-06

## 2017-09-27 NOTE — PROGRESS NOTES
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      Dr. 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  At her last visit with Dr. Giang, she was started on valium 2 mg BID, which she states she has taken very inconsistently, if at all.  She reports no significant improvement in chorea to date.  Prior authorization for tetrabenazine was attempted, but unsuccessful.      On 9/20, she was seen by pain management who performed an epidural to alleviate pain caused by degenerative disk disease.      She has been evaluated by Dr. Lagos with Neuropsychology, where she was diagnosed with mild neurocognitive disorder, with predominantly frontal-subcortical cognitive dysfunction.  Additionally, Dr. Lagos recommended that neurology consider a psychostimulant; OT eval, discontinue driving, possibly psychotherapy.     Additionally, I discussed with pt the possibility of publishing a case report discussing her hemichorea and its etiology.  She was agreeable to this and signed photographic/videotaping consent waiver/release (consent uploaded into chart).    Review of Systems:  Constitutional: no fever or chills  Eyes: no visual changes  ENT: no nasal congestion or sore throat  Respiratory: no cough or shortness of breath  Cardiovascular: no chest pain or palpitations  Gastrointestinal: positive for abdominal pain  Genitourinary: positive for urinary incontinence  Integument/Breast: no rash or pruritis  Musculoskeletal: no arthralgias or myalgias  Neurological: positive for L hemichorea  Behavioral/Psych: no auditory or visual hallucinations      Past Medical History  Past Medical  History:   Diagnosis Date    Anemia     Arthritis     Hashimoto's disease     Hypertension     IGT (impaired glucose tolerance) 1/12/2016    Joint pain     Multinodular goiter 1/12/2016       Medications    Current Outpatient Prescriptions:     amlodipine (NORVASC) 5 MG tablet, TAKE 1 TABLET DAILY, Disp: 90 tablet, Rfl: 2    deutetrabenazine (AUSTEDO) 9 mg Tab, Take 2 tablets by mouth 2 (two) times daily. Final titrating dose - needs initially titration pack from company, Disp: 120 tablet, Rfl: 11    diazePAM (VALIUM) 2 MG tablet, Take 1 tablet (2 mg total) by mouth every 6 (six) hours as needed for Anxiety. Trial 1/2 - 1 tab as needed BID PRN, Disp: 30 tablet, Rfl: 1    dicyclomine (BENTYL) 20 mg tablet, Take 1 tablet (20 mg total) by mouth every 6 (six) hours as needed., Disp: 360 tablet, Rfl: 0    ferrous sulfate 325 (65 FE) MG EC tablet, Take 325 mg by mouth 2 (two) times daily. , Disp: , Rfl:     gabapentin (NEURONTIN) 300 MG capsule, Take 1 capsule (300 mg total) by mouth 3 (three) times daily., Disp: 60 capsule, Rfl: 11    hydrocodone-acetaminophen 5-325mg (NORCO) 5-325 mg per tablet, Take 1 tablet by mouth every 24 hours as needed for Pain., Disp: 30 tablet, Rfl: 0    hydroxychloroquine (PLAQUENIL) 200 mg tablet, TAKE 2 TABLETS ONCE DAILY, Disp: 180 tablet, Rfl: 1    montelukast (SINGULAIR) 10 mg tablet, TAKE 1 TABLET EVERY EVENING, Disp: 90 tablet, Rfl: 2    predniSONE (DELTASONE) 5 MG tablet, TAKE 2 TABLETS ONCE DAILY, Disp: 180 tablet, Rfl: 0    thiamine 100 MG tablet, Take 1 tablet (100 mg total) by mouth once daily., Disp: , Rfl:   Any other notable medications as documented in HPI    Allergies  Review of patient's allergies indicates:  No Known Allergies    Social History  Social History     Social History    Marital status:      Spouse name: N/A    Number of children: N/A    Years of education: N/A     Occupational History    Not on file.     Social History Main Topics     "Smoking status: Never Smoker    Smokeless tobacco: Never Used    Alcohol use No    Drug use: No    Sexual activity: No      Comment: ,  age 52,      Other Topics Concern    Not on file     Social History Narrative    No narrative on file     Any other notable Social History as documented in HPI.    Family History  Family History   Problem Relation Age of Onset    Hypertension Mother     Prostate cancer Father      prostate cancer    Breast cancer Maternal Grandmother     Lupus Neg Hx     Psoriasis Neg Hx     Melanoma Neg Hx     Colon cancer Neg Hx      Any other notable FMH as documented in HPI.    Physical Exam  /71   Pulse 76   Ht 5' 5" (1.651 m)   Wt 71.2 kg (157 lb)   BMI 26.13 kg/m²     General: Well-developed, well-groomed. No apparent distress  Cardiovascular: Regular rate and rhythm with no murmurs, rubs or gallops.  Chest: Lungs clear to auscultation bilaterally.  No wheezes, stridor, ronchi appreciated.  Abdomen: Normoactive bowel sounds present.  Soft, nontender to palpation.  Extremities: No peripheral edema    Neurologic Exam: The patient is awake, alert and oriented. Language is fluent.  Fund of knowledge is appropriate.     Cranial nerves:   Pupils are round and reactive to light and accommodation.  Visual fields are full to confrontation.    Ocular motility is full in all cardinal positions of gaze.   Facial sensation is normal to light touch.   Facial activation is symmetric.   Hearing is symmetric bilaterally.   Palate elevates symmetrically.   Shoulder elevation is symmetric and full strength bilaterally.   Tongue is midline and neck rotation strength is normal bilaterally.      Motor examination L-sided hemichorea, occasional blepharospasm of L eye.  Strength is 4+/5 in the upper and lower extremities bilaterally.     Sensory examination is normal light touch in BUE and BLE.    Deep tendon reflexes are 2+ and symmetric in the upper and lower extremities " bilaterally.  No clonus, downgoing toes b/l.    Gait: Deferred - in wheelchair    Coordination: Finger to nose and heel to shin testing is normal in both upper and lower extremities.      Lab and Test Results    WBC   Date Value Ref Range Status   09/07/2017 11.02 3.90 - 12.70 K/uL Final   08/24/2017 6.97 3.90 - 12.70 K/uL Final   08/03/2017 9.98 3.90 - 12.70 K/uL Final     Hemoglobin   Date Value Ref Range Status   09/07/2017 12.6 12.0 - 16.0 g/dL Final   08/24/2017 11.5 (L) 12.0 - 16.0 g/dL Final   08/03/2017 12.6 12.0 - 16.0 g/dL Final     Hematocrit   Date Value Ref Range Status   09/07/2017 40.5 37.0 - 48.5 % Final   08/24/2017 36.2 (L) 37.0 - 48.5 % Final   08/03/2017 40.1 37.0 - 48.5 % Final     Platelets   Date Value Ref Range Status   09/07/2017 278 150 - 350 K/uL Final   08/24/2017 286 150 - 350 K/uL Final   08/03/2017 255 150 - 350 K/uL Final     Glucose   Date Value Ref Range Status   09/07/2017 106 70 - 110 mg/dL Final   08/03/2017 107 70 - 110 mg/dL Final   05/10/2017 76 70 - 110 mg/dL Final     Sodium   Date Value Ref Range Status   09/07/2017 142 136 - 145 mmol/L Final   08/03/2017 143 136 - 145 mmol/L Final   05/10/2017 141 136 - 145 mmol/L Final     Potassium   Date Value Ref Range Status   09/07/2017 3.6 3.5 - 5.1 mmol/L Final   08/03/2017 4.1 3.5 - 5.1 mmol/L Final   05/10/2017 4.2 3.5 - 5.1 mmol/L Final     Chloride   Date Value Ref Range Status   09/07/2017 105 95 - 110 mmol/L Final   08/03/2017 105 95 - 110 mmol/L Final   05/10/2017 101 95 - 110 mmol/L Final     CO2   Date Value Ref Range Status   09/07/2017 25 23 - 29 mmol/L Final   08/03/2017 26 23 - 29 mmol/L Final   05/10/2017 31 (H) 23 - 29 mmol/L Final     BUN, Bld   Date Value Ref Range Status   09/07/2017 12 8 - 23 mg/dL Final   08/03/2017 15 8 - 23 mg/dL Final   05/10/2017 17 8 - 23 mg/dL Final     Creatinine   Date Value Ref Range Status   09/07/2017 0.9 0.5 - 1.4 mg/dL Final   08/03/2017 0.8 0.5 - 1.4 mg/dL Final   06/28/2017 1.0  0.5 - 1.4 mg/dL Final     Calcium   Date Value Ref Range Status   09/07/2017 9.7 8.7 - 10.5 mg/dL Final   08/03/2017 9.5 8.7 - 10.5 mg/dL Final   05/10/2017 9.6 8.7 - 10.5 mg/dL Final     Magnesium   Date Value Ref Range Status   02/04/2015 1.9 1.6 - 2.6 mg/dL Final   05/02/2011 2.1 1.6 - 2.6 mg/dl Final     Phosphorus   Date Value Ref Range Status   05/02/2011 3.1 2.7 - 4.5 mg/dl Final     Alkaline Phosphatase   Date Value Ref Range Status   09/07/2017 66 55 - 135 U/L Final   08/03/2017 66 55 - 135 U/L Final   05/10/2017 75 55 - 135 U/L Final     ALT   Date Value Ref Range Status   09/07/2017 9 (L) 10 - 44 U/L Final   08/03/2017 10 10 - 44 U/L Final   05/10/2017 7 (L) 10 - 44 U/L Final     AST   Date Value Ref Range Status   09/07/2017 15 10 - 40 U/L Final   08/03/2017 12 10 - 40 U/L Final   05/10/2017 9 (L) 10 - 40 U/L Final         Images:     8/29/17 MRI Brain w/ and w/o: Per resident review,  Lacunar infarct of R caudate.        Independently reviewed yes    Assessment and Plan    I discussed with pt the possibility of publishing a case report discussing her hemichorea and its etiology.  She was agreeable to this and signed photographic/videotaping consent waiver/release (consent uploaded into chart).    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  Her hemichorea is stable, with minimal improvement with valium, although noted that she has not been taking consistently if at all.  Tetrabenazine was declined, will attempt authorization for deutetrabenazine with titration to 18 mg BID.      Her other PMH as described above is currently stable.    Pt provided consent for publishing her case in an academic journal, signed photo/video consent which has been uploaded into chart.    Recommendations &  Plan:  -Deutetrabenazine to be delivered to pt's home after approved, titrate up to 18 mg BID  -Valium 2 mg BID  -Occupational Therapy referral  -Continue to follow up with neuropsychology/psychology as directed  -Will hold off on neurostimulant now until chorea is under better control  -Return to clinic in 2-4 weeks           Relevant Orders    Ambulatory Referral to Physical/Occupational Therapy      Other Visit Diagnoses    None.           Branden Ruboi MD  Neurology Resident   [facility]  [address] [place] [address]

## 2017-09-27 NOTE — PATIENT INSTRUCTIONS
-Deutetrabenazine to be delivered  -Valium 2 mg two times per day   -Occupational Therapy referral  -Continue to follow up with neuropsychology/psychology as directed  -Return to clinic in 2-4 weeks

## 2017-09-28 NOTE — ASSESSMENT & PLAN NOTE
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  Her hemichorea is stable, with minimal improvement with valium, although noted that she has not been taking consistently if at all.  Tetrabenazine was declined, will attempt authorization for deutetrabenazine with titration to 18 mg BID.      Her other PMH as described above is currently stable.    Pt provided consent for publishing her case in an academic journal, signed photo/video consent which has been uploaded into chart.    Recommendations & Plan:  -Deutetrabenazine to be delivered to pt's home after approved, titrate up to 18 mg BID  -Valium 2 mg BID  -Occupational Therapy referral  -Continue to follow up with neuropsychology/psychology as directed  -Will hold off on neurostimulant now until chorea is under better control  -Return to clinic in 2-4 weeks

## 2017-09-29 ENCOUNTER — TELEPHONE (OUTPATIENT)
Dept: NEUROLOGY | Facility: CLINIC | Age: 82
End: 2017-09-29

## 2017-09-29 NOTE — TELEPHONE ENCOUNTER
----- Message from Vania Contreras sent at 9/28/2017  3:50 PM CDT -----  Contact: briana    [employer]    [phone]  pls call concerning pts prescription for deutetrabenazine (AUSTEDO) 9 mg Tab.

## 2017-10-03 ENCOUNTER — NURSE TRIAGE (OUTPATIENT)
Dept: ADMINISTRATIVE | Facility: CLINIC | Age: 82
End: 2017-10-03

## 2017-10-03 NOTE — TELEPHONE ENCOUNTER
Returned call and spoke to Rupal and she states they received the second paperwork from office and will get medication process for patient. They did not need anything else from our office. They will call us if they need anything else for patient.

## 2017-10-04 ENCOUNTER — HOSPITAL ENCOUNTER (OUTPATIENT)
Facility: HOSPITAL | Age: 82
Discharge: HOME | End: 2017-10-04
Attending: INTERNAL MEDICINE | Admitting: INTERNAL MEDICINE
Payer: MEDICARE

## 2017-10-04 ENCOUNTER — ANESTHESIA EVENT (OUTPATIENT)
Dept: ENDOSCOPY | Facility: HOSPITAL | Age: 82
End: 2017-10-04
Payer: MEDICARE

## 2017-10-04 ENCOUNTER — ANESTHESIA (OUTPATIENT)
Dept: ENDOSCOPY | Facility: HOSPITAL | Age: 82
End: 2017-10-04
Payer: MEDICARE

## 2017-10-04 DIAGNOSIS — R10.9 ABDOMINAL PAIN: ICD-10-CM

## 2017-10-04 PROCEDURE — 43239 EGD BIOPSY SINGLE/MULTIPLE: CPT | Mod: ,,, | Performed by: INTERNAL MEDICINE

## 2017-10-04 PROCEDURE — 88305 TISSUE EXAM BY PATHOLOGIST: CPT | Mod: 26,,, | Performed by: PATHOLOGY

## 2017-10-04 PROCEDURE — 63600175 PHARM REV CODE 636 W HCPCS: Performed by: NURSE ANESTHETIST, CERTIFIED REGISTERED

## 2017-10-04 PROCEDURE — 43239 EGD BIOPSY SINGLE/MULTIPLE: CPT | Performed by: INTERNAL MEDICINE

## 2017-10-04 PROCEDURE — 25000003 PHARM REV CODE 250: Performed by: INTERNAL MEDICINE

## 2017-10-04 PROCEDURE — 37000009 HC ANESTHESIA EA ADD 15 MINS: Performed by: INTERNAL MEDICINE

## 2017-10-04 PROCEDURE — 88305 TISSUE EXAM BY PATHOLOGIST: CPT | Performed by: PATHOLOGY

## 2017-10-04 PROCEDURE — 37000008 HC ANESTHESIA 1ST 15 MINUTES: Performed by: INTERNAL MEDICINE

## 2017-10-04 PROCEDURE — 27201012 HC FORCEPS, HOT/COLD, DISP: Performed by: INTERNAL MEDICINE

## 2017-10-04 RX ORDER — SODIUM CHLORIDE 9 MG/ML
INJECTION, SOLUTION INTRAVENOUS CONTINUOUS
Status: DISCONTINUED | OUTPATIENT
Start: 2017-10-04 | End: 2017-10-04 | Stop reason: HOSPADM

## 2017-10-04 RX ORDER — ONDANSETRON 2 MG/ML
INJECTION INTRAMUSCULAR; INTRAVENOUS
Status: DISCONTINUED | OUTPATIENT
Start: 2017-10-04 | End: 2017-10-04

## 2017-10-04 RX ORDER — PROPOFOL 10 MG/ML
VIAL (ML) INTRAVENOUS
Status: DISCONTINUED | OUTPATIENT
Start: 2017-10-04 | End: 2017-10-04

## 2017-10-04 RX ORDER — LIDOCAINE HCL/PF 100 MG/5ML
SYRINGE (ML) INTRAVENOUS
Status: DISCONTINUED | OUTPATIENT
Start: 2017-10-04 | End: 2017-10-04

## 2017-10-04 RX ADMIN — PROPOFOL 20 MG: 10 INJECTION, EMULSION INTRAVENOUS at 11:10

## 2017-10-04 RX ADMIN — SODIUM CHLORIDE: 0.9 INJECTION, SOLUTION INTRAVENOUS at 10:10

## 2017-10-04 RX ADMIN — LIDOCAINE HYDROCHLORIDE 80 MG: 20 INJECTION, SOLUTION INTRAVENOUS at 11:10

## 2017-10-04 RX ADMIN — ONDANSETRON 4 MG: 2 INJECTION, SOLUTION INTRAMUSCULAR; INTRAVENOUS at 11:10

## 2017-10-04 NOTE — OR NURSING
Attempting to get BP and O2 sat reading prior to sedation with difficulty. Pt with sparkle red nail polish; relocated sensor and able to achieve reading. Pt c/o unbearable pain with BP cuff. Relocated cuff to right leg. Pt still with c/o but able to get reading.

## 2017-10-04 NOTE — TELEPHONE ENCOUNTER
Reason for Disposition   Question about upcoming scheduled test, no triage required and triager able to answer question    Protocols used: ST INFORMATION ONLY CALL-A-AH    Selma Bacon called to find out time for NPO in preparation for EGD tomorrow morning.  Per standard EGD pre-procedure instructions, told her nothing by mouth after midnight.  No red -colored foods or drinks at all.  BP, cardiac meds in AM with sip of water. No other questions.  Message to Mg Morton MD, GI.  Please contact caller directly with any additional care advice.

## 2017-10-04 NOTE — ANESTHESIA POSTPROCEDURE EVALUATION
"Anesthesia Post Evaluation    Patient: Selma Alonzo Lux MD    Procedure(s) Performed: Procedure(s) (LRB):  ESOPHAGOGASTRODUODENOSCOPY (EGD) (N/A)    Final Anesthesia Type: MAC  Patient location during evaluation: GI PACU  Patient participation: Yes- Able to Participate  Level of consciousness: awake and alert and oriented  Post-procedure vital signs: reviewed and stable  Pain management: adequate  Airway patency: patent  PONV status at discharge: No PONV  Anesthetic complications: no      Cardiovascular status: blood pressure returned to baseline and hemodynamically stable  Respiratory status: unassisted  Hydration status: euvolemic  Follow-up not needed.        Visit Vitals  BP (!) 164/74 (BP Location: Right arm, Patient Position: Lying)   Pulse 74   Temp 36.6 °C (97.9 °F) (Oral)   Resp 18   Ht 5' 5" (1.651 m)   Wt 70.3 kg (155 lb)   SpO2 100%   Breastfeeding? No   BMI 25.79 kg/m²       Pain/Izzy Score: Pain Assessment Performed: Yes (10/4/2017 10:19 AM)  Presence of Pain: complains of pain/discomfort (neck ) (10/4/2017 10:19 AM)  Pain Rating Prior to Med Admin: 5 (10/4/2017 10:19 AM)      "

## 2017-10-04 NOTE — PROGRESS NOTES
Patient seen and examined.  I agree with the history, exam, assessment and plan within the resident's note.        Baldomero Giang MD, MPH  Division of Movement and Memory Disorders  [facility]

## 2017-10-04 NOTE — ANESTHESIA PREPROCEDURE EVALUATION
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   I have reviewed the Medications.     Review of Systems  Social:  No Alcohol Use, Non-Smoker   Hematology/Oncology:         -- Anemia:   Cardiovascular:   Exercise tolerance: poor Hypertension    Hepatic/GI:   PUD, Liver Disease,    Musculoskeletal:   Arthritis     Endocrine:   goiter       Physical Exam  General:  Well nourished       Chest/Lungs:  Chest/Lungs Clear    Heart/Vascular:  Heart Findings: Normal            Anesthesia Plan  Type of Anesthesia, risks & benefits discussed:  Anesthesia Type:  MAC  Patient's Preference:   Intra-op Monitoring Plan:   Intra-op Monitoring Plan Comments:   Post Op Pain Control Plan:   Post Op Pain Control Plan Comments:   Induction:    Beta Blocker:  Patient is not currently on a Beta-Blocker (No further documentation required).       Informed Consent: Patient understands risks and agrees with Anesthesia plan.  Questions answered. Anesthesia consent signed with patient.  ASA Score: 2     Day of Surgery Review of History & Physical:            Ready For Surgery From Anesthesia Perspective.

## 2017-10-04 NOTE — DISCHARGE INSTRUCTIONS
Post EGD Discharge Instruction    Selma Lux MD  10/4/2017  Mg Morton MD    RESTRICTIONS ON ACTIVITY:    -DO NOT drive a car, operate machinery or make critical decisions, or do activities that require coordination or balance for 24 hours.  -Following Day: Return to full activities including work.  -Diet: Eat and drink normally unless instructed otherwise.    TREATMENT FOR COMMON SIDE EFFECTS:  *Sore Throat - treat with throat lozenges, gargle with warm salt water.  *Mild abdominal pain & bloating- rest and take liquids only.    SYMPTOMS TO WATCH FOR AND REPORT TO YOUR PHYSICIAN:  1. Chills or fever occurring 24 hours after procedure.  2. Pain in chest.  3. SEVERE abdominal pain or bloating.  4. Rectal bleeding which could be maroon or black.    If you have any questions or problems, please call your Physician:    Mg oMrton MD Phone: ***    Lab Results: [phone]    If a complication or emergency situation arises and you are unable to reach your Physician - GO TO THE EMERGENCY ROOM.

## 2017-10-04 NOTE — TRANSFER OF CARE
"Anesthesia Transfer of Care Note    Patient: Selma Lux MD    Procedure(s) Performed: Procedure(s) (LRB):  ESOPHAGOGASTRODUODENOSCOPY (EGD) (N/A)    Patient location: GI    Anesthesia Type: MAC    Transport from OR: Transported from OR on room air with adequate spontaneous ventilation    Post pain: adequate analgesia    Post assessment: no apparent anesthetic complications and tolerated procedure well    Post vital signs: stable    Level of consciousness: awake, alert and oriented    Nausea/Vomiting: no nausea/vomiting    Complications: none          Last vitals:   Visit Vitals  BP (!) 164/74 (BP Location: Right arm, Patient Position: Lying)   Pulse 74   Temp 36.6 °C (97.9 °F) (Oral)   Resp 18   Ht 5' 5" (1.651 m)   Wt 70.3 kg (155 lb)   SpO2 100%   Breastfeeding? No   BMI 25.79 kg/m²     "

## 2017-10-04 NOTE — INTERVAL H&P NOTE
The patient has been examined and the H&P has been reviewed:    I concur with the findings and no changes have occurred since H&P was written.    Anesthesia/Surgery risks, benefits and alternative options discussed and understood by patient/family.          Active Hospital Problems    Diagnosis  POA    Abdominal pain [R10.9]  Yes      Resolved Hospital Problems    Diagnosis Date Resolved POA   No resolved problems to display.

## 2017-10-05 ENCOUNTER — CLINICAL SUPPORT (OUTPATIENT)
Dept: AUDIOLOGY | Facility: CLINIC | Age: 82
End: 2017-10-05
Payer: MEDICARE

## 2017-10-05 ENCOUNTER — OFFICE VISIT (OUTPATIENT)
Dept: OTOLARYNGOLOGY | Facility: CLINIC | Age: 82
End: 2017-10-05
Payer: MEDICARE

## 2017-10-05 VITALS
DIASTOLIC BLOOD PRESSURE: 86 MMHG | SYSTOLIC BLOOD PRESSURE: 120 MMHG | HEART RATE: 85 BPM | RESPIRATION RATE: 16 BRPM | BODY MASS INDEX: 25.83 KG/M2 | WEIGHT: 155 LBS | HEIGHT: 65 IN | OXYGEN SATURATION: 98 % | TEMPERATURE: 98 F

## 2017-10-05 VITALS
WEIGHT: 154.31 LBS | DIASTOLIC BLOOD PRESSURE: 74 MMHG | SYSTOLIC BLOOD PRESSURE: 130 MMHG | HEART RATE: 74 BPM | BODY MASS INDEX: 25.68 KG/M2

## 2017-10-05 DIAGNOSIS — M26.622 ARTHRALGIA OF LEFT TEMPOROMANDIBULAR JOINT: ICD-10-CM

## 2017-10-05 DIAGNOSIS — H90.3 SENSORINEURAL HEARING LOSS, BILATERAL: Primary | ICD-10-CM

## 2017-10-05 DIAGNOSIS — H61.21 IMPACTED CERUMEN OF RIGHT EAR: ICD-10-CM

## 2017-10-05 DIAGNOSIS — H92.02 LEFT EAR PAIN: Primary | ICD-10-CM

## 2017-10-05 PROCEDURE — 99213 OFFICE O/P EST LOW 20 MIN: CPT | Mod: PBBFAC,25,27 | Performed by: NURSE PRACTITIONER

## 2017-10-05 PROCEDURE — 69210 REMOVE IMPACTED EAR WAX UNI: CPT | Mod: S$PBB,,, | Performed by: NURSE PRACTITIONER

## 2017-10-05 PROCEDURE — 69210 REMOVE IMPACTED EAR WAX UNI: CPT | Mod: PBBFAC | Performed by: NURSE PRACTITIONER

## 2017-10-05 PROCEDURE — 99213 OFFICE O/P EST LOW 20 MIN: CPT | Mod: 25,S$PBB,, | Performed by: NURSE PRACTITIONER

## 2017-10-05 PROCEDURE — 99999 PR PBB SHADOW E&M-EST. PATIENT-LVL I: CPT | Mod: PBBFAC,,,

## 2017-10-05 PROCEDURE — 99999 PR PBB SHADOW E&M-EST. PATIENT-LVL III: CPT | Mod: PBBFAC,,, | Performed by: NURSE PRACTITIONER

## 2017-10-05 PROCEDURE — 92557 COMPREHENSIVE HEARING TEST: CPT | Mod: PBBFAC | Performed by: AUDIOLOGIST

## 2017-10-05 PROCEDURE — 99211 OFF/OP EST MAY X REQ PHY/QHP: CPT | Mod: PBBFAC,25

## 2017-10-05 NOTE — PROGRESS NOTES
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    Chief Complaint: Cerumen Impaction and Otalgia (left ear )    Otalgia    There is pain in the left ear. This is a new problem. The current episode started 1 to 4 weeks ago. The problem occurs constantly. The problem has been unchanged. There has been no fever. The pain is at a severity of 0/10. The patient is experiencing no pain. Pertinent negatives include no abdominal pain, coughing, diarrhea, ear discharge, headaches, hearing loss, neck pain, rash, rhinorrhea, sore throat or vomiting. She has tried nothing for the symptoms. The treatment provided mild relief. There is no history of a chronic ear infection, hearing loss or a tympanostomy tube.      Past Medical History: Patient has a past medical history of Anemia; Arthritis; Hashimoto's disease; Hypertension; IGT (impaired glucose tolerance) (1/12/2016); Joint pain; and Multinodular goiter (1/12/2016).    Past Surgical History: Patient has a past surgical history that includes Salivary gland surgery; Tonsillectomy; left parotidectomy; Cataract extraction; Colonoscopy (N/A, 9/29/2015); Joint replacement; and Knee surgery (Left, 12/31/2013).    Social History: Patient reports that she has never smoked. She has never used smokeless tobacco. She reports that she does not drink alcohol or use drugs.    Family History: family history includes Breast cancer in her maternal grandmother; Hypertension in her mother; Prostate cancer in her father.    Medications:   Current Outpatient Prescriptions   Medication Sig    amlodipine (NORVASC) 5 MG tablet TAKE 1 TABLET DAILY    deutetrabenazine (AUSTEDO) 9 mg Tab Take 2 tablets by mouth 2 (two) times daily. Final titrating dose - needs initially titration pack from company    diazePAM (VALIUM) 2 MG tablet Take 1 tablet (2 mg total) by mouth every 6 (six) hours as needed for Anxiety. Trial 1/2 - 1 tab as needed BID PRN    dicyclomine (BENTYL) 20 mg  tablet Take 1 tablet (20 mg total) by mouth every 6 (six) hours as needed.    ferrous sulfate 325 (65 FE) MG EC tablet Take 325 mg by mouth 2 (two) times daily.     gabapentin (NEURONTIN) 300 MG capsule Take 1 capsule (300 mg total) by mouth 3 (three) times daily.    hydrocodone-acetaminophen 5-325mg (NORCO) 5-325 mg per tablet Take 1 tablet by mouth every 24 hours as needed for Pain.    hydroxychloroquine (PLAQUENIL) 200 mg tablet TAKE 2 TABLETS ONCE DAILY    montelukast (SINGULAIR) 10 mg tablet TAKE 1 TABLET EVERY EVENING    predniSONE (DELTASONE) 5 MG tablet TAKE 2 TABLETS ONCE DAILY    thiamine 100 MG tablet Take 1 tablet (100 mg total) by mouth once daily.     No current facility-administered medications for this visit.        Allergies: Patient has No Known Allergies.    Review of Systems   Constitutional: Negative for activity change, appetite change, chills, diaphoresis, fatigue, fever and unexpected weight change.   HENT: Positive for ear pain. Negative for congestion, dental problem, drooling, ear discharge, facial swelling, hearing loss, mouth sores, nosebleeds, postnasal drip, rhinorrhea, sinus pain, sinus pressure, sneezing, sore throat, tinnitus, trouble swallowing and voice change.    Eyes: Negative for pain and visual disturbance.   Respiratory: Negative for cough, chest tightness, shortness of breath, wheezing and stridor.    Cardiovascular: Negative for chest pain.   Gastrointestinal: Negative for abdominal pain, diarrhea and vomiting.   Musculoskeletal: Negative for gait problem and neck pain.   Skin: Negative for color change and rash.   Allergic/Immunologic: Negative for environmental allergies.   Neurological: Negative for dizziness, seizures, syncope, facial asymmetry, speech difficulty, weakness, light-headedness, numbness and headaches.   Psychiatric/Behavioral: Negative for agitation and confusion. The patient is not nervous/anxious.        Objective:       /74 (BP Location:  Right arm, Patient Position: Sitting, BP Method: Large (Automatic))   Pulse 74   Wt 70 kg (154 lb 5.2 oz)   BMI 25.68 kg/m²     Physical Exam   Constitutional: She is oriented to person, place, and time. She appears well-developed and well-nourished.   HENT:   Head: Normocephalic and atraumatic. Not macrocephalic and not microcephalic. Head is without raccoon's eyes, without Yang's sign, without abrasion, without contusion, without laceration, without right periorbital erythema and without left periorbital erythema. Hair is normal.   Right Ear: Tympanic membrane, external ear and ear canal normal. No lacerations. No drainage, swelling or tenderness. No foreign bodies. No mastoid tenderness. Tympanic membrane is not injected, not scarred, not perforated, not erythematous, not retracted and not bulging. Tympanic membrane mobility is normal. No middle ear effusion. No hemotympanum. Decreased hearing is noted.   Left Ear: Tympanic membrane, external ear and ear canal normal. No lacerations. No drainage, swelling or tenderness. No foreign bodies. No mastoid tenderness. Tympanic membrane is not injected, not scarred, not perforated, not erythematous, not retracted and not bulging. Tympanic membrane mobility is normal.  No middle ear effusion. No hemotympanum. Decreased hearing is noted.   Nose: Nose normal. No mucosal edema, rhinorrhea, nose lacerations, sinus tenderness or nasal deformity.   Mouth/Throat: Uvula is midline, oropharynx is clear and moist and mucous membranes are normal. No oral lesions. No trismus in the jaw. Abnormal dentition. No dental abscesses, uvula swelling or lacerations.   Procedure Note:    Patient was brought to the minor procedure room and using the operating microscope the right ear canal  was cleaned of ceruminous debris. There was a significant cerumen impaction.  The forceps and suction were both used to perform this. Tympanic membrane intact. Pt tolerated well. There were no  complications.    No AOM or OE.  No mastoid, tenderness, swelling, or redness.  Facial nerve intact.     Eyes: Conjunctivae, EOM and lids are normal. Pupils are equal, round, and reactive to light.   Neck: Trachea normal and normal range of motion. Neck supple. No spinous process tenderness and no muscular tenderness present. No neck rigidity. No edema, no erythema and normal range of motion present. No thyroid mass and no thyromegaly present.   Pulmonary/Chest: Effort normal.   Abdominal: Soft.   Musculoskeletal: Normal range of motion.   Lymphadenopathy:        Head (right side): No submental, no submandibular, no tonsillar, no preauricular and no posterior auricular adenopathy present.        Head (left side): No submental, no submandibular, no tonsillar, no preauricular, no posterior auricular and no occipital adenopathy present.     She has no cervical adenopathy.   Neurological: She is alert and oriented to person, place, and time. No cranial nerve deficit or sensory deficit.   Skin: Skin is warm and dry.   Psychiatric: She has a normal mood and affect. Her behavior is normal. Judgment and thought content normal.   Nursing note and vitals reviewed.      As a result of this patients history and examination findings, a comprehensive audiogram was ordered to determine the level of hearing/hearing loss.        Assessment:       1. Left ear pain    2. Impacted cerumen of right ear    3. Arthralgia of left temporomandibular joint        Plan:       Audiogram Reviewed: B HF SNHL.  Noise and Hearing Protection pamphlet provided.  Hearing conservation strongly recommended.  Trial of amplification is not currently indicated.  Re-check of hearing after 87 years of age or prn sooner if symptoms worsen.  TMJ regimen with soft diet, NSAID's, Heating pad over joint for 20 minutes tid for 7-10 days. If no better consider re evaluation for use of muscle relaxants and or referral to Oral Surgery specialist.  F/U with  Dentist.  F/U with PCP as per schedule.  Encouraged yearly ear cleanings.  RTC prn.

## 2017-10-05 NOTE — PATIENT INSTRUCTIONS
Audiogram Reviewed: B HF SNHL.  Noise and Hearing Protection pamphlet provided.  Hearing conservation strongly recommended.  Trial of amplification is not currently indicated.  Re-check of hearing after 87 years of age or prn sooner if symptoms worsen.  TMJ regimen with soft diet, NSAID's, Heating pad over joint for 20 minutes tid for 7-10 days. If no better consider re evaluation for use of muscle relaxants and or referral to Oral Surgery specialist.  F/U with Dentist.  F/U with PCP as per schedule.  Encouraged yearly ear cleanings.  RTC prn.

## 2017-10-09 ENCOUNTER — TELEPHONE (OUTPATIENT)
Dept: INTERNAL MEDICINE | Facility: CLINIC | Age: 82
End: 2017-10-09

## 2017-10-09 DIAGNOSIS — Z12.31 ENCOUNTER FOR SCREENING MAMMOGRAM FOR MALIGNANT NEOPLASM OF BREAST: Primary | ICD-10-CM

## 2017-10-09 NOTE — TELEPHONE ENCOUNTER
Called pt and she mentioned that she never received her  Medication Austedo. I put the medication refill in and routed it over to the provider.

## 2017-10-09 NOTE — TELEPHONE ENCOUNTER
----- Message from Lorelei Romo sent at 10/9/2017  1:06 PM CDT -----  Contact: self/[phone]  Patient called in regards needing an order for annual mammogram. Please call and advise.       Thank you!!!

## 2017-10-09 NOTE — TELEPHONE ENCOUNTER
----- Message from Puneet Garcia sent at 10/9/2017  9:58 AM CDT -----  Contact: Self @ [phone]  Pt is asking to speak with the doctor or someone on the staff about a new medication she was going to start 3 weeks ago. Pls call.

## 2017-10-10 ENCOUNTER — CLINICAL SUPPORT (OUTPATIENT)
Dept: REHABILITATION | Facility: HOSPITAL | Age: 82
End: 2017-10-10
Attending: STUDENT IN AN ORGANIZED HEALTH CARE EDUCATION/TRAINING PROGRAM
Payer: MEDICARE

## 2017-10-10 ENCOUNTER — TELEPHONE (OUTPATIENT)
Dept: GASTROENTEROLOGY | Facility: CLINIC | Age: 82
End: 2017-10-10

## 2017-10-10 DIAGNOSIS — Z74.09 IMPAIRED FUNCTIONAL MOBILITY, BALANCE, GAIT, AND ENDURANCE: ICD-10-CM

## 2017-10-10 DIAGNOSIS — R29.898 DECREASED STRENGTH OF LOWER EXTREMITY: ICD-10-CM

## 2017-10-10 PROCEDURE — G8978 MOBILITY CURRENT STATUS: HCPCS | Mod: CK,PO

## 2017-10-10 PROCEDURE — 97162 PT EVAL MOD COMPLEX 30 MIN: CPT | Mod: PO

## 2017-10-10 PROCEDURE — G8979 MOBILITY GOAL STATUS: HCPCS | Mod: CI,PO

## 2017-10-10 NOTE — TELEPHONE ENCOUNTER
----- Message from Mg Morton MD sent at 10/9/2017  8:38 PM CDT -----  Small bowel biopsies normal, gastric bx with inflammation, no evidence of malignancy. Appeared to be healing. Esophageal bx also with inflammation, no evidence of malignancy. Given the significant benefit of bentyl for her, I think it is worthwhile to continue, she was doing considerably better. Happy to f/u with her if needed

## 2017-10-10 NOTE — TELEPHONE ENCOUNTER
----- Message from Gurmeet Pedro sent at 10/10/2017  3:21 PM CDT -----  Contact: Sissy Cowart @ [phone] opt 2  Caller is calling to get an update on the prior authorization for (deutetrabenazine (AUSTEDO) 9 mg Tab ) pls call for fax back form to [phone]

## 2017-10-10 NOTE — TELEPHONE ENCOUNTER
Results given to patient. Patient verbalized understanding. Will call back if symptoms persists with bentyl and diet modifications.

## 2017-10-10 NOTE — PLAN OF CARE
"OUTPATIENT NEUROLOGICAL REHABILITATION  PHYSICAL THERAPY EVALUATION    Name: Selma Rosado MD Jose J  Clinic Number: [id number]    Diagnosis:   Encounter Diagnoses   Name Primary?    Impaired functional mobility, balance, gait, and endurance     Decreased strength of lower extremity      Physician: Branden Rubio MD  Treatment Orders: PT Eval and Treat  Past Medical History:   Diagnosis Date    Anemia     Arthritis     Hashimoto's disease     Hypertension     IGT (impaired glucose tolerance) 1/12/2016    Joint pain     Multinodular goiter 1/12/2016       Evaluation Date: 10/10/2017  Visit #: 1  Plan of care expiration: 12/5/2017  Precautions: universal, visual impairment    History     Medical Diagnosis: hemichorea  PT Diagnosis: Decreased nadiya LE strength, decreased ambulation, impaired balance  PMH significant for: low back pain, OA of multiple joints, visual deficits, cognitive impairment, B rotator cuff repair    History of Present Illness: Selma is a 82 y.o. female that presents to [facility] with reported uncontrolled LUE/LLE movement, gait deviations, impaired dynamic balance, and decreased LE strength. Pt poor historian. Pt was unable to specify when "uncontrolled movements" began, what triggers them, or what alleviates them. Pt reports that they "come and go" throughout the day.      Chief complaints:  1. Uncontrolled movement in LLE and LUE  2. Balance deficits  3. L side neck pain  4. Decreased functional mobility and ambulation at home  5. L knee pain  6. Decreased endurance    Falls in the past year: none  Prior Therapy: Related to knee replacement, rotator cuff repair  Nutrition:  overweight  Social History/Support systems: aide helps  Place of Residence (Steps/Adaptations/Levels): 5 steps to enter house; lives alone, assistance provided during the morning by aide  Previous functional status includes: IND with driving, dressing  Current functional status:  MOD I, " uses SPC to ambulate  Exercise routine prior to onset : Sedentary  DME owned:SPC, RW, claw-foot tub  Work/Job description includes:  Not working      Subjective   Pt stated goals: I would like to be able to walk well again.   Family present/states: none present  Pain: neck pain 8-9 at worst, 7-8 at present    Objective   - Follows commands: 80% of time   - Speech: difficulty with word finding    Mental status: alert, oriented to person, place; somewhat anxious, agitated  Appearance: Casually dressed  Behavior:  cooperative  Attention Span and Concentration:  Impaired to some degree    Dominant hand:  Right    Posture Alignment in sitting and standing:   Head: forward head   Scapulae: slouched posture   Trunk: slouched posture   Pelvis: Posteriorly tilted   Legs: decreased knee extension    Sensation: Light Touch: Impaired: LLE > RLE         Tone: WFL    Visual/Auditory: denies changes    Coordination:   - UE coordination: grossly WFL  - LE coordination:  Grossly WFL    ROM:   UPPER EXTREMITY--AROM/PROM  (R) and (L) UE: Decreased shoulder abduction, flexion         RANGE OF MOTION--LOWER EXTREMITIES  (R) and (L) LE Hip: normal                Knee: lacks ~5 degrees flexion               Ankle: WFL    Strength: manual muscle test grades below   Upper Extremity Strength  (R) UE  (L) UE    Shoulder Flexion: 3/5 Shoulder Flexion: 3/5   Shoulder Abduction: 4-/5 Shoulder Abduction: 2/5   Shoulder Extension: 3/5 Shoulder Extension:   3/5   Elbow Flexion: 3/5 Elbow Flexion: 3/5   Elbow Extension: 5/5 Elbow Extension: 5/5   Wrist Flexion: 4/5 Wrist Flexion: 4/5   Wrist Extension: 3/5 Wrist Extension: 3/5   : 3+/5 : 3/5     Lower Extremity Strength  Right LE  Left LE    Hip Flexion: 3/5 Hip Flexion: 4/5   Hip Abduction: 4/5 Hip Abduction: 4/5   Hip Adduction: 5/5 Hip Adduction 4+/5   Knee Extension: 4+/5 Knee Extension: 4+/5   Knee Flexion: 5/5 Knee Flexion: 4+/5   Ankle Dorsiflexion: 5/5 Ankle Dorsiflexion: 5/5      Abdominal Strength: 3/5     Evaluation   Single Limb Stance R LE NT   Single Limb Stance L LE NT   30 second Chair Rise 10 completed w/o UE support     Postural control:  MCTSIB:  1. Eyes Open/feet together/Firm: 30 seconds  2. Eyes Closed/feet together/Firm: 30 seconds  3. Eyes Open/feet together/Foam: 30 seconds  4. Eyes Closed/feet together/Foam: 30 seconds    Gait Assessment:   - AD used: SPC (RT hand)  - Assistance: none  - Distance: ~120ft with mod I    GAIT DEVIATIONS:  Selma displays the following deviations with ambulation: decreased step length on RLE, trendelenburg on L hip, decreased step width nadiya, forward trunk lean, increased R hip adduction in TSW/IC    Impairments contributing to deviations: impaired motor control, decreased LE strength    Endurance Deficit: patient is sedentary during normal daily routine     Evaluation   Timed Up and Go 11 sec c/ SPC   Self Selected Walking Speed 0.71 m/sec (6m/8.5s)  C/ SPC   Fast Walking Speed NT     Functional Gait Assessment:   1. Gait on level surface =  3  2. Change in Gait Speed = 2  3. Gait with horizontal head turns  = 2  4. Gait with vertical head turns = 3  5. Gait with pivot turns = 0  6. Step over obstacle = 0  7. Gait with Narrow LYNDSEY = 0  8. Gait with eyes closed = 2  9. Ambulating Backwards = 0  10. Steps = 2    Score 14/30     FOTO for Cerebrovascular Disorders: Status: 46%    Functional Limitations Reports - G Codes  Category: Mobility  Tool: FGA  Score: 14/30  Current: CK at least 40% < 60% impaired, limited or restricted  Goal: CI at least 1% but less than 20% impaired, limited or restricted    Education provided re:role of PT, goals for PT, scheduling - pt verbalized understanding. Discussed insurance limitations with pt.     Selma verbalized fair understanding of education provided.   Pt has no cultural, educational or language barriers to learning provided.      Assessment   This is a 82 y.o. female referred to outpatient physical  therapy and presents with a medical diagnosis of hemichorea.  Patient presents with gait deviations, impaired dynamic balance, decreased LE (LLE > RLE), and is at increased risk of falling. FGA shows that patient has difficulty with dynamic balance, especially with pivot turns, stepping over obstacles, ambulating backwards, and tandem walking. Patient is IND with ADL's, but gait deviations increases risk of falling and increases pt's energy demand during ambulation. Pt would benefit from dynamic balance training, gait training, and LE strengthening to address above deficits and decrease fall risk.    PT/PTA met face to face to discuss pt's treatment plan and established goals. Pt will be seen by physical therapy every 6th visit and minimally once per month.     Pt rehab potential is Fair. Pt will benefit from continuing skilled outpatient physical therapy to address the deficits listed below in the problem list, provide pt/family education and to maximize pt's level of independence in the home and community environment.     History  Co-morbidities and personal factors that may impact the plan of care Examination  Body Structures and Functions, activity limitations and participation restrictions that may impact the plan of care. Medical necessity is demonstrated by the following impairments. Clinical Presentation Decision Making/ Complexity Score   1. OA of multiple joints  2. LBP  3. Visual deficits  4. Cognitive deficits 1. Decreased balance  2. Gait deficits  3. Increased fall risk  4. Decreased LE strength  5. Impaired endurance  6. Postural deficits evolving-unpredictable symptom presentation, cognitive deficits    high high moderate moderate       Pt's spiritual, cultural and educational needs considered and pt agreeable to plan of care and goals as stated below:     GOALS:   Short term goals: 4 weeks, pt agrees to goals set.  7. Pt to report performing HEP daily to increased IND.  8. Pt to demonstrate nadiya hip  flexion strength to 4+/5 to improve functional mobility.   9. Pt to demonstrate nadiya hip abduction strength to 4+/5 to improve functional mobility.   10. Pt to score >20/30 on FGA to improve dynamic balance    Long term goals: 8 weeks, pt agrees to goals set  11. Pt to be 100% compliant and IND with HEP.  12. Pt to demonstrate more upright standing/seated head/shoulder posture to improve balance.  13. Pt to improve SSWS to at least 0.86m/sec for improved safety with community ambulation.   14. Pt to demonstrate nadiya hip flexion strength of 5/5 to improve functional mobility.  15. Pt to demonstrate nadiya hip abduction strength to 5/5 to improve functional mobility.  16. Pt to demonstrate increased step width at least 75% of trial during ambulation to improve Tristan and decrease fall risk.   17. Pt to score >26/30 on FGA to improve dynamic balance  18. Pt to improve FOTO score to at least 60% for improved self concept with functional mobility.       Plan   Outpatient physical therapy 2 times weekly to include: Pt Education, HEP, therapeutic exercises, neuromuscular re-education, therapeutic activities, manual therapy, joint mobilizations, aquatic therapy and modalities PRN to achieve established goals. Pt may be seen by PTA as part of the rehabilitation team.     Cont PT for  8 weeks.     Kayleen Bañuelos, PT  10/10/2017

## 2017-10-11 ENCOUNTER — TELEPHONE (OUTPATIENT)
Dept: NEUROLOGY | Facility: CLINIC | Age: 82
End: 2017-10-11

## 2017-10-11 NOTE — TELEPHONE ENCOUNTER
----- Message from Reny Maier sent at 10/11/2017 10:08 AM CDT -----  Contact: Patient [phone]  Patient is calling to speak with Dr. Rubio about her medication deutetrabenazine (AUSTEDO) 9 mg Tab, patient did not want to elaborate on what exactly it is she wanted to talk about in reference to the medication.

## 2017-10-11 NOTE — TELEPHONE ENCOUNTER
"Spoke with Dr. Lux at ~1200 in regards to her concerns about the medication.  She was extremely tearful over the phone, stating the instructions are too much to handle, and she is feeling "overwhelmed."  She was unable to answer questions about whether she has taken Austedo or not.  When I asked if she had any thoughts of self-harm, SI/HI, or plan, she wavered, and then said that I could "tell them yes," because she wanted to come in and be evaluated.    I asked if her friend was around who could visit, help her out, and she said no, she didn't want to bother her.    I discussed the case with Dr. Giang, and we decided to recommend to pt that she present to the ED for evaluation for confusion, AMS, mood disorder.     I called Dr. Lux back and explained our recommendation and concerns.  Her mood was improved by the time of the second call, but she still would like to come in to be evaluated.  However, because she lives alone, has a history of mild cognitive impairment, and I suspect a mood disorder, I continued to recommend that she present to the ED for evaluation.  She was agreeable to calling EMS or her friend to be brought to the [facility] ED for evaluation.  I have relayed the above info to the ED triage nurse.    Given our conversation, I did consider whether her symptoms would merit an outpatient appointment, however her history of vascular disease, living alone, concerns for medication mismanagement (manages herself and have planned to initiate a new dopamine-depleting medication - which I am unable to verify if she started taking at all, or if she is/was taking correctly), I think this merits ED evaluation.      Consider: CMP, CBC, UA, psychiatric evaluation, admission/placement.  "

## 2017-10-12 ENCOUNTER — TELEPHONE (OUTPATIENT)
Dept: NEUROLOGY | Facility: HOSPITAL | Age: 82
End: 2017-10-12

## 2017-10-12 ENCOUNTER — PATIENT MESSAGE (OUTPATIENT)
Dept: NEUROLOGY | Facility: HOSPITAL | Age: 82
End: 2017-10-12

## 2017-10-12 ENCOUNTER — TELEPHONE (OUTPATIENT)
Dept: NEUROLOGY | Facility: CLINIC | Age: 82
End: 2017-10-12

## 2017-10-12 DIAGNOSIS — F39 MOOD DISORDER: Primary | ICD-10-CM

## 2017-10-12 NOTE — TELEPHONE ENCOUNTER
----- Message from Vania Contreras sent at 10/12/2017 10:43 AM CDT -----  Contact: [employer]    [phone] option 2  Calling to see if someone received pts prior auth paper work for deutetrabenazine (AUSTEDO) 9 mg Tab.  pls call.

## 2017-10-12 NOTE — TELEPHONE ENCOUNTER
Telephone Encounter  Encounter Date: 10/12/2017  Branden Rubio MD   Neurology      []Hide copied text  Discussed with pt and her daughter (3 way call), Rosy.  Dr. 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  She stated that she has NOT taken austedo yet.      I reviewed the events of yesterday, and we came up with the following plan to address her mood symptoms and chorea.     -DISCONTINUE/DON'T START taking austedo.

## 2017-10-12 NOTE — TELEPHONE ENCOUNTER
Discussed with pt and her daughter (3 way call), Rosy.   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  She stated that she has NOT taken austedo yet.      I reviewed the events of yesterday, and we came up with the following plan to address her mood symptoms and chorea.    -DISCONTINUE/DON'T START taking austedo.    -Referral to psychiatry (PLEASE CALL [phone] to schedule appt ok for resident clinic).  Pt and her daughter agreed to call this number in 15 minutes after our phone call.    -Pt and daughter will email psychologist Jeniffer Corea, per Dr. Lagos's recommendation, to make an appointment  [email or web address])    -Follow up in neurology clinic as scheduled on 11/8/17 at 2:30 PM.      -Will call EMS/911 for emergencies.  Call neurology clinic at [phone] for any questions.    Branden Rubio MD

## 2017-10-17 ENCOUNTER — HOSPITAL ENCOUNTER (OUTPATIENT)
Dept: RADIOLOGY | Facility: HOSPITAL | Age: 82
Discharge: HOME | End: 2017-10-17
Attending: FAMILY MEDICINE
Payer: MEDICARE

## 2017-10-17 ENCOUNTER — TELEPHONE (OUTPATIENT)
Dept: NEUROLOGY | Facility: CLINIC | Age: 82
End: 2017-10-17

## 2017-10-17 DIAGNOSIS — Z12.31 ENCOUNTER FOR SCREENING MAMMOGRAM FOR MALIGNANT NEOPLASM OF BREAST: ICD-10-CM

## 2017-10-17 PROCEDURE — 77067 SCR MAMMO BI INCL CAD: CPT | Mod: TC

## 2017-10-17 PROCEDURE — 77067 SCR MAMMO BI INCL CAD: CPT | Mod: 26,,, | Performed by: RADIOLOGY

## 2017-10-17 NOTE — TELEPHONE ENCOUNTER
----- Message from Gurmeet Pedro sent at 10/17/2017  1:33 PM CDT -----  Contact: Sonia [facility]ton [facility] @ [phone] opt 2  Caller is calling to get an update on the prior authorization for ( Austedo  )  pls call

## 2017-10-24 ENCOUNTER — CLINICAL SUPPORT (OUTPATIENT)
Dept: REHABILITATION | Facility: HOSPITAL | Age: 82
End: 2017-10-24
Attending: STUDENT IN AN ORGANIZED HEALTH CARE EDUCATION/TRAINING PROGRAM
Payer: MEDICARE

## 2017-10-24 DIAGNOSIS — R29.898 DECREASED STRENGTH OF LOWER EXTREMITY: ICD-10-CM

## 2017-10-24 DIAGNOSIS — Z74.09 IMPAIRED FUNCTIONAL MOBILITY, BALANCE, GAIT, AND ENDURANCE: ICD-10-CM

## 2017-10-24 PROCEDURE — 97110 THERAPEUTIC EXERCISES: CPT | Mod: PO

## 2017-10-24 PROCEDURE — 97112 NEUROMUSCULAR REEDUCATION: CPT | Mod: PO

## 2017-10-24 NOTE — PROGRESS NOTES
"                                                  Physical Therapy Progress Note     Name: Selma Rosado MD Jose J  Clinic Number: [id number]  Diagnosis:   Encounter Diagnoses   Name Primary?    Impaired functional mobility, balance, gait, and endurance     Decreased strength of lower extremity      Physician: Branden Rubio MD  Treatment Orders: PT Eval and Treat  Past Medical History:   Diagnosis Date    Anemia     Arthritis     Hashimoto's disease     Hypertension     IGT (impaired glucose tolerance) 1/12/2016    Joint pain     Multinodular goiter 1/12/2016       Evaluation Date: 10/10/2017  Visit #: 2  Plan of care expiration: 12/5/2017  Precautions: universal, visual impairment    G codes 2/10    FGA Not seeing SLP     Subjective   Pt reports: Reports that she's aware that her R knee "scoops in" when she walks.  Pain Scale:  0/10    Objective     Patient received individual therapy with activities as follows:     Selma received therapeutic exercises to develop strength, endurance and flexibility for 15 minutes including:  x10 mins Nustep on Level 1.0 maintaining 70-80 steps/min Alexander UE and LE to improve CV endurance    Patient participated in neuromuscular re-education activities to improve: Balance, Coordination, Kinesthetic and Proprioception for 30 minutes. The following activities were included:     Dynamic Balance activities at ballet bar with gait belt and CGA to min A:   Alternating marching with taping med cones x10 each LE   x7 taping at targets on ground forward, to side, and back; pt c/o increasing LLE pain during activity and could not complete full set of 10 reps   Standing at stairs on blue foam with CGA: patient placed toes on first step to decrease support and improve SLS balance x 30 sec for each LE   Standing at stairs on blue foam with eyes closed x 30 sec    Patient requested two short seated rest breaks during session today.    Written Home Exercises: pt educated on walking " daily 5-10 mins daily between sessions  Pt demo fair understanding of the education provided. Selma demonstrated fair return demonstration of activities.     Education provided re: POC, HEP  No spiritual or educational barriers to learning provided    Pt has no cultural, educational or language barriers to learning provided.    Assessment   Selma continues to have difficulty with static and dynamic balance, as well as gait activities. She requires some assist during above activities and some rest breaks due to fatigue. Patient would continue to benefit from physical therapy with focus on static and dynamic balance, aerobic exercise, gait training, and functional mobility training to address above deficits.    Pt rehab potential is Fair. Pt will benefit from continuing skilled outpatient physical therapy to address the deficits listed below in the problem list, provide pt/family education and to maximize pt's level of independence in the home and community environment.      History  Co-morbidities and personal factors that may impact the plan of care Examination  Body Structures and Functions, activity limitations and participation restrictions that may impact the plan of care. Medical necessity is demonstrated by the following impairments. Clinical Presentation Decision Making/ Complexity Score   1. OA of multiple joints  2. LBP  3. Visual deficits  4. Cognitive deficits 1. Decreased balance  2. Gait deficits  3. Increased fall risk  4. Decreased LE strength  5. Impaired endurance  6. Postural deficits evolving-unpredictable symptom presentation, cognitive deficits     high high moderate moderate         Pt's spiritual, cultural and educational needs considered and pt agreeable to plan of care and goals as stated below:      GOALS:   Short term goals: 4 weeks, pt agrees to goals set.  7. Pt to report performing HEP daily to increased IND.  8. Pt to demonstrate nadiya hip flexion strength to 4+/5 to improve functional  mobility.   9. Pt to demonstrate nadiya hip abduction strength to 4+/5 to improve functional mobility.   10. Pt to score >20/30 on FGA to improve dynamic balance     Long term goals: 8 weeks, pt agrees to goals set  11. Pt to be 100% compliant and IND with HEP.  12. Pt to demonstrate more upright standing/seated head/shoulder posture to improve balance.  13. Pt to improve SSWS to at least 0.86m/sec for improved safety with community ambulation.   14. Pt to demonstrate nadiya hip flexion strength of 5/5 to improve functional mobility.  15. Pt to demonstrate nadiya hip abduction strength to 5/5 to improve functional mobility.  16. Pt to demonstrate increased step width at least 75% of trial during ambulation to improve Tristan and decrease fall risk.   17. Pt to score >26/30 on FGA to improve dynamic balance  18. Pt to improve FOTO score to at least 60% for improved self concept with functional mobility.        Plan   Continue PT 2x weekly under established Plan of Care, with treatment to include: pt education, HEP, therapeutic exercises, neuromuscular re-education/balance exercises, therapeutic activities, joint mobilizations, and modalities PRN, to work towards established goals. Pt may be seen by PTA to carry out plan of care.     Kayleen Bañuelos, PT   10/24/2017

## 2017-10-25 RX ORDER — HYDROXYCHLOROQUINE SULFATE 200 MG/1
TABLET, FILM COATED ORAL
Qty: 180 TABLET | Refills: 1 | Status: ON HOLD | OUTPATIENT
Start: 2017-10-25 | End: 2018-05-01

## 2017-10-27 ENCOUNTER — CLINICAL SUPPORT (OUTPATIENT)
Dept: REHABILITATION | Facility: HOSPITAL | Age: 82
End: 2017-10-27
Attending: STUDENT IN AN ORGANIZED HEALTH CARE EDUCATION/TRAINING PROGRAM
Payer: MEDICARE

## 2017-10-27 DIAGNOSIS — R29.898 DECREASED STRENGTH OF LOWER EXTREMITY: ICD-10-CM

## 2017-10-27 DIAGNOSIS — Z74.09 IMPAIRED FUNCTIONAL MOBILITY, BALANCE, GAIT, AND ENDURANCE: ICD-10-CM

## 2017-10-27 PROCEDURE — 97112 NEUROMUSCULAR REEDUCATION: CPT | Mod: PO

## 2017-10-27 PROCEDURE — 97110 THERAPEUTIC EXERCISES: CPT | Mod: PO

## 2017-10-27 NOTE — PROGRESS NOTES
"                                                  Physical Therapy Progress Note     Name: Selma Rosado MD Jose J  Clinic Number: [id number]  Diagnosis:   Encounter Diagnoses   Name Primary?    Impaired functional mobility, balance, gait, and endurance     Decreased strength of lower extremity      Physician: Branden Rubio MD  Treatment Orders: PT Eval and Treat  Past Medical History:   Diagnosis Date    Anemia     Arthritis     Hashimoto's disease     Hypertension     IGT (impaired glucose tolerance) 1/12/2016    Joint pain     Multinodular goiter 1/12/2016       Evaluation Date: 10/10/2017  Visit #: 3  Plan of care expiration: 12/5/2017  Precautions: universal, visual impairment    G codes 3/10    FGA Not seeing SLP     Subjective   Pt reports: " I'm doing ok."   Pain Scale:  L arm     Objective     Patient received individual therapy with activities as follows:     Selma received therapeutic exercises to develop strength, endurance and flexibility for 15 minutes including:  x10 mins Nustep on Level 1.0 maintaining 70-80 steps/min Alexander UE and LE to improve CV endurance  2 x 30 sec of B LE sitting HS stretches with stool  2 x 30 sec of B LE standing HC stretches on incline     Patient participated in neuromuscular re-education activities to improve: Balance, Coordination, Kinesthetic and Proprioception for 30 minutes. The following activities were included:     Dynamic Balance activities at ballet bar with gait belt and CGA    4 x 30 sec of tandem stance   X 4 laps of forward tandem ambulation with 1 UE support    X 4 laps of marching with 3 sec hold and 1 UE support     Airex foam pad:    2 x 30 sec of static standing with arms crossed    X 60 sec of static standing with arms crossed and head turns right/left    Patient requested two short seated rest breaks during session today.    Written Home Exercises: pt educated on walking daily 5-10 mins daily between sessions  Pt demo fair understanding of " the education provided. Selma demonstrated fair return demonstration of activities.     Education provided re: POC, HEP  No spiritual or educational barriers to learning provided    Pt has no cultural, educational or language barriers to learning provided.    Assessment   Selma tolerated tx session fairly well and did not have any complaints.  Pt required 2 sitting rest breaks during tx session while performing standing balance.  Cont to require CGA for all dynamic standing exercises.   Patient would continue to benefit from physical therapy with focus on static and dynamic balance, aerobic exercise, gait training, and functional mobility training to address above deficits.    Pt rehab potential is Fair. Pt will benefit from continuing skilled outpatient physical therapy to address the deficits listed below in the problem list, provide pt/family education and to maximize pt's level of independence in the home and community environment.      History  Co-morbidities and personal factors that may impact the plan of care Examination  Body Structures and Functions, activity limitations and participation restrictions that may impact the plan of care. Medical necessity is demonstrated by the following impairments. Clinical Presentation Decision Making/ Complexity Score   1. OA of multiple joints  2. LBP  3. Visual deficits  4. Cognitive deficits 1. Decreased balance  2. Gait deficits  3. Increased fall risk  4. Decreased LE strength  5. Impaired endurance  6. Postural deficits evolving-unpredictable symptom presentation, cognitive deficits     high high moderate moderate         Pt's spiritual, cultural and educational needs considered and pt agreeable to plan of care and goals as stated below:      GOALS:   Short term goals: 4 weeks, pt agrees to goals set.  7. Pt to report performing HEP daily to increased IND.  8. Pt to demonstrate nadiya hip flexion strength to 4+/5 to improve functional mobility.   9. Pt to demonstrate  nadiya hip abduction strength to 4+/5 to improve functional mobility.   10. Pt to score >20/30 on FGA to improve dynamic balance     Long term goals: 8 weeks, pt agrees to goals set  11. Pt to be 100% compliant and IND with HEP.  12. Pt to demonstrate more upright standing/seated head/shoulder posture to improve balance.  13. Pt to improve SSWS to at least 0.86m/sec for improved safety with community ambulation.   14. Pt to demonstrate nadiya hip flexion strength of 5/5 to improve functional mobility.  15. Pt to demonstrate nadiya hip abduction strength to 5/5 to improve functional mobility.  16. Pt to demonstrate increased step width at least 75% of trial during ambulation to improve Tristan and decrease fall risk.   17. Pt to score >26/30 on FGA to improve dynamic balance  18. Pt to improve FOTO score to at least 60% for improved self concept with functional mobility.        Plan   Continue PT 2x weekly under established Plan of Care, with treatment to include: pt education, HEP, therapeutic exercises, neuromuscular re-education/balance exercises, therapeutic activities, joint mobilizations, and modalities PRN, to work towards established goals. Pt may be seen by PTA to carry out plan of care.     Katerine Mchugh, PTA   10/27/2017

## 2017-10-30 ENCOUNTER — OFFICE VISIT (OUTPATIENT)
Dept: INTERNAL MEDICINE | Facility: CLINIC | Age: 82
End: 2017-10-30
Attending: FAMILY MEDICINE
Payer: MEDICARE

## 2017-10-30 VITALS
BODY MASS INDEX: 25.62 KG/M2 | DIASTOLIC BLOOD PRESSURE: 58 MMHG | HEART RATE: 89 BPM | SYSTOLIC BLOOD PRESSURE: 102 MMHG | OXYGEN SATURATION: 98 % | HEIGHT: 65 IN | WEIGHT: 153.75 LBS

## 2017-10-30 DIAGNOSIS — M05.79 SEROPOSITIVE RHEUMATOID ARTHRITIS OF MULTIPLE SITES: ICD-10-CM

## 2017-10-30 DIAGNOSIS — M47.812 OSTEOARTHRITIS OF CERVICAL SPINE, UNSPECIFIED SPINAL OSTEOARTHRITIS COMPLICATION STATUS: ICD-10-CM

## 2017-10-30 DIAGNOSIS — I10 HYPERTENSION, ESSENTIAL: ICD-10-CM

## 2017-10-30 DIAGNOSIS — G89.4 CHRONIC PAIN SYNDROME: ICD-10-CM

## 2017-10-30 DIAGNOSIS — Z74.09 IMPAIRED FUNCTIONAL MOBILITY, BALANCE, GAIT, AND ENDURANCE: ICD-10-CM

## 2017-10-30 DIAGNOSIS — E06.3 HASHIMOTO'S DISEASE: ICD-10-CM

## 2017-10-30 DIAGNOSIS — K58.2 IRRITABLE BOWEL SYNDROME WITH BOTH CONSTIPATION AND DIARRHEA: ICD-10-CM

## 2017-10-30 DIAGNOSIS — M54.50 CHRONIC BILATERAL LOW BACK PAIN WITHOUT SCIATICA: ICD-10-CM

## 2017-10-30 DIAGNOSIS — G25.5 HEMICHOREA: Primary | ICD-10-CM

## 2017-10-30 DIAGNOSIS — R26.9 ABNORMALITY OF GAIT AND MOBILITY: ICD-10-CM

## 2017-10-30 DIAGNOSIS — G89.29 CHRONIC BILATERAL LOW BACK PAIN WITHOUT SCIATICA: ICD-10-CM

## 2017-10-30 PROBLEM — R29.898 DECREASED STRENGTH OF LOWER EXTREMITY: Status: RESOLVED | Noted: 2017-10-10 | Resolved: 2017-10-30

## 2017-10-30 PROBLEM — K62.5 RECTAL BLEEDING: Status: RESOLVED | Noted: 2017-02-22 | Resolved: 2017-10-30

## 2017-10-30 PROBLEM — R10.9 ABDOMINAL PAIN: Status: RESOLVED | Noted: 2017-10-04 | Resolved: 2017-10-30

## 2017-10-30 PROCEDURE — 99214 OFFICE O/P EST MOD 30 MIN: CPT | Mod: S$PBB,,, | Performed by: FAMILY MEDICINE

## 2017-10-30 PROCEDURE — 99213 OFFICE O/P EST LOW 20 MIN: CPT | Mod: PBBFAC | Performed by: FAMILY MEDICINE

## 2017-10-30 PROCEDURE — 99999 PR PBB SHADOW E&M-EST. PATIENT-LVL III: CPT | Mod: PBBFAC,,, | Performed by: FAMILY MEDICINE

## 2017-10-30 NOTE — PROGRESS NOTES
Subjective:       Patient ID: Selma Rosado MD Jose J is a 82 y.o. female.    Chief Complaint: Follow-up    HPI  Review of Systems   Constitutional: Positive for fatigue. Negative for chills and fever.   HENT: Negative for congestion and trouble swallowing.    Eyes: Negative for redness.   Respiratory: Negative for cough, chest tightness and shortness of breath.    Cardiovascular: Negative for chest pain, palpitations and leg swelling.   Gastrointestinal: Positive for abdominal distention and abdominal pain. Negative for blood in stool.   Genitourinary: Negative for hematuria and menstrual problem.   Musculoskeletal: Positive for arthralgias, back pain and gait problem. Negative for joint swelling, myalgias and neck pain.   Skin: Negative for color change and rash.   Neurological: Positive for tremors. Negative for speech difficulty, weakness, numbness and headaches.   Hematological: Negative for adenopathy. Does not bruise/bleed easily.   Psychiatric/Behavioral: Positive for decreased concentration and sleep disturbance. Negative for behavioral problems and confusion. The patient is not nervous/anxious.        Objective:      Physical Exam   Constitutional: She is oriented to person, place, and time. She appears well-developed and well-nourished.   Eyes: No scleral icterus.   Neck: Normal range of motion. Neck supple.   Cardiovascular: Normal rate, normal heart sounds and intact distal pulses.  Exam reveals no gallop and no friction rub.    No murmur heard.  Pulmonary/Chest: Effort normal and breath sounds normal. No respiratory distress. She has no wheezes. She has no rales.   Abdominal: Soft. Bowel sounds are normal. She exhibits no distension and no abdominal bruit. There is no tenderness.   Musculoskeletal: She exhibits no edema.   Neurological: She is alert and oriented to person, place, and time. She displays tremor. No cranial nerve deficit. Coordination and gait abnormal.   Skin: Skin is warm and dry. No  rash noted. She is not diaphoretic. No erythema.   Psychiatric: She has a normal mood and affect. Her behavior is normal. Judgment and thought content normal.   Nursing note and vitals reviewed.      Assessment:       1. Hemichorea    2. Hypertension, essential    3. Chronic pain syndrome    4. Abnormality of gait and mobility    5. Chronic bilateral low back pain without sciatica    6. Hashimoto's disease    7. Osteoarthritis of cervical spine, unspecified spinal osteoarthritis complication status    8. Irritable bowel syndrome with both constipation and diarrhea    9. Impaired functional mobility, balance, gait, and endurance    10. Seropositive rheumatoid arthritis of multiple sites        Plan:   Selma was seen today for follow-up.    Diagnoses and all orders for this visit:    Hemichorea    Hypertension, essential    Chronic pain syndrome    Abnormality of gait and mobility    Chronic bilateral low back pain without sciatica    Hashimoto's disease    Osteoarthritis of cervical spine, unspecified spinal osteoarthritis complication status    Irritable bowel syndrome with both constipation and diarrhea    Impaired functional mobility, balance, gait, and endurance    Seropositive rheumatoid arthritis of multiple sites      See meds, orders, follow up, routing and instructions sections of encounter.  HISTORY OF PRESENT ILLNESS:  An 82-year-old established female patient.  She is   in for a followup.  She has seen multiple providers since seeing me last.  I did   review this.  There are many current conditions, I explained to her, putting it   together,  with the goals of trying to make her as happy is as possible and as   safe as possible.  She is having progressive hemichorea, as evidenced by   worsening physical examination, at this time, she has had followups with her   neurologist pending, also reviewed her neuropsychological assessment and another   clinic appointments recently, she is having pain in multiple  areas at this   time, asking for a refill of her Vicodin medication.    RECOMMENDATIONS:  Please see assessment and orders.  I reviewed all of her   interval clinic encounter notes with her consultants, laboratory and imaging,   etc.  The patient asked about an old MRI scan dating back to 2015.  This was   performed out of system and I do not see any present results scanned in.    Likewise, I explained to her that given the current situation with pain   medication prescribing, we would only be able to give her current small amount   and if there were any consideration for long-term, we would need to have her   back in to discuss how that works with the new State board regulations, etc.      SELENA/HN  dd: 10/30/2017 14:12:15 (CDT)  td: 10/31/2017 02:55:55 (CDT)  Doc ID   #[id number]  Job ID #[id number]    CC:

## 2017-11-02 ENCOUNTER — CLINICAL SUPPORT (OUTPATIENT)
Dept: REHABILITATION | Facility: HOSPITAL | Age: 82
End: 2017-11-02
Attending: STUDENT IN AN ORGANIZED HEALTH CARE EDUCATION/TRAINING PROGRAM
Payer: MEDICARE

## 2017-11-02 DIAGNOSIS — Z74.09 IMPAIRED FUNCTIONAL MOBILITY, BALANCE, GAIT, AND ENDURANCE: ICD-10-CM

## 2017-11-02 PROCEDURE — 97110 THERAPEUTIC EXERCISES: CPT | Mod: PO

## 2017-11-02 PROCEDURE — 97112 NEUROMUSCULAR REEDUCATION: CPT | Mod: PO

## 2017-11-02 NOTE — PROGRESS NOTES
"                                                  Physical Therapy Progress Note     Name: Selma Alonzo Lux MD  Clinic Number: [id number]  Diagnosis:   Encounter Diagnosis   Name Primary?    Impaired functional mobility, balance, gait, and endurance      Physician: Branden Rubio MD  Treatment Orders: PT Eval and Treat  Past Medical History:   Diagnosis Date    Anemia     Arthritis     Hashimoto's disease     Hypertension     IGT (impaired glucose tolerance) 1/12/2016    Joint pain     Multinodular goiter 1/12/2016       Evaluation Date: 10/10/2017  Visit #: 4  Plan of care expiration: 12/5/2017  Precautions: universal, visual impairment    G codes 4/10    FGA Not seeing SLP     Subjective   Pt reports: " I'm achy and my neck arthritis always is sore"   Pain Scale:  None rated  Pt late to session but able to accommodate.     Objective     Patient received individual therapy with activities as follows:     Patient participated in neuromuscular re-education activities to improve: Balance, Coordination, Kinesthetic and Proprioception for 30 minutes. The following activities were included:     Dynamic Balance activities at parallel bars with gait belt and CGA/SBA   X 4 laps of forward tandem ambulation with intermittent 1 hand rail UE support    X 4 laps of marching with 3 sec hold and 1 UE support   X 2 laps stepping over row of small cones fwd, x 2 laps lateral steps - no HR     Static standing feet together with balloon toss x 5 min - dynamic reaching - relies on stepping strategy for balance recovery      Airex foam pad:    4 x 30 sec of tandem stance     2 x 30 sec of standing feet together with head turns vertical and horizontal, no HR     Slema received therapeutic exercises to develop strength, endurance and flexibility for 20 minutes including:  x15 mins Nustep on Level 2.0 Alexander UE and LE to improve CV endurance (last 10 min supervision by rehab tech)  3 x 30 sec of B LE standing HC stretches on " "incline     Patient requested one short seated rest break during session today.      Written Home Exercises: pt educated on walking daily 5-10 mins daily between sessions  Pt demo fair understanding of the education provided. Selma demonstrated fair return demonstration of activities.     Education provided re: POC, HEP  No spiritual or educational barriers to learning provided    Pt has no cultural, educational or language barriers to learning provided.    Assessment   Selma tolerated tx session very well with only 1 rest break today. At the end of the session she stated that she felt "better than I have all day." She continues to be unsteady with gait even with her cane and relies on stepping strategy for balance recovery rather than all ankle strategy. Patient would continue to benefit from physical therapy with focus on static and dynamic balance, aerobic exercise, gait training, and functional mobility training to address above deficits.    Pt rehab potential is Fair. Pt will benefit from continuing skilled outpatient physical therapy to address the deficits listed below in the problem list, provide pt/family education and to maximize pt's level of independence in the home and community environment.      History  Co-morbidities and personal factors that may impact the plan of care Examination  Body Structures and Functions, activity limitations and participation restrictions that may impact the plan of care. Medical necessity is demonstrated by the following impairments. Clinical Presentation Decision Making/ Complexity Score   1. OA of multiple joints  2. LBP  3. Visual deficits  4. Cognitive deficits 1. Decreased balance  2. Gait deficits  3. Increased fall risk  4. Decreased LE strength  5. Impaired endurance  6. Postural deficits evolving-unpredictable symptom presentation, cognitive deficits     high high moderate moderate         Pt's spiritual, cultural and educational needs considered and pt agreeable " to plan of care and goals as stated below:      GOALS:   Short term goals: 4 weeks, pt agrees to goals set.  7. Pt to report performing HEP daily to increased IND.  8. Pt to demonstrate nadiya hip flexion strength to 4+/5 to improve functional mobility.   9. Pt to demonstrate nadiya hip abduction strength to 4+/5 to improve functional mobility.   10. Pt to score >20/30 on FGA to improve dynamic balance     Long term goals: 8 weeks, pt agrees to goals set  11. Pt to be 100% compliant and IND with HEP.  12. Pt to demonstrate more upright standing/seated head/shoulder posture to improve balance.  13. Pt to improve SSWS to at least 0.86m/sec for improved safety with community ambulation.   14. Pt to demonstrate nadiya hip flexion strength of 5/5 to improve functional mobility.  15. Pt to demonstrate nadiya hip abduction strength to 5/5 to improve functional mobility.  16. Pt to demonstrate increased step width at least 75% of trial during ambulation to improve Tristan and decrease fall risk.   17. Pt to score >26/30 on FGA to improve dynamic balance  18. Pt to improve FOTO score to at least 60% for improved self concept with functional mobility.        Plan   Continue PT 2x weekly under established Plan of Care, with treatment to include: pt education, HEP, therapeutic exercises, neuromuscular re-education/balance exercises, therapeutic activities, joint mobilizations, and modalities PRN, to work towards established goals. Pt may be seen by PTA to carry out plan of care.     Kelsey Swift, PT   11/02/2017

## 2017-11-06 ENCOUNTER — TELEPHONE (OUTPATIENT)
Dept: GASTROENTEROLOGY | Facility: CLINIC | Age: 82
End: 2017-11-06

## 2017-11-06 ENCOUNTER — OFFICE VISIT (OUTPATIENT)
Dept: GASTROENTEROLOGY | Facility: CLINIC | Age: 82
End: 2017-11-06
Payer: MEDICARE

## 2017-11-06 VITALS
WEIGHT: 156.31 LBS | SYSTOLIC BLOOD PRESSURE: 118 MMHG | DIASTOLIC BLOOD PRESSURE: 56 MMHG | BODY MASS INDEX: 26.01 KG/M2 | HEART RATE: 95 BPM

## 2017-11-06 DIAGNOSIS — K58.1 IRRITABLE BOWEL SYNDROME WITH CONSTIPATION: Primary | ICD-10-CM

## 2017-11-06 PROCEDURE — 99214 OFFICE O/P EST MOD 30 MIN: CPT | Mod: S$PBB,,, | Performed by: INTERNAL MEDICINE

## 2017-11-06 PROCEDURE — 99212 OFFICE O/P EST SF 10 MIN: CPT | Mod: PBBFAC,PN | Performed by: INTERNAL MEDICINE

## 2017-11-06 PROCEDURE — 99999 PR PBB SHADOW E&M-EST. PATIENT-LVL II: CPT | Mod: PBBFAC,,, | Performed by: INTERNAL MEDICINE

## 2017-11-06 NOTE — TELEPHONE ENCOUNTER
----- Message from Becky Deng sent at 11/6/2017 11:36 AM CST -----  Contact: [phone] self  Patient is in a lot of pain and would like to speak with the doctor. Please advise.

## 2017-11-06 NOTE — PROGRESS NOTES
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    Chief Complaint: Abdominal Pain    This is an 81yo female who presents complaining of abdominal pain. She has abdominal pain chronically and had been dx'd with IBS, though her symptoms more recently are somewhat different and significant only worsen.  She describes it as a diffuse abdominal distention which is severe, nonradiating.  It can be worsened with food intake but also can improve with food intake.  No particular dietary relation.  She has chronic constipation though recently has also had 1 or 2 episodes of incontinence.  No other exacerbating or relieving factors or other associated symptoms.  No dark urine, jaundice, flushing, fever.  A colonoscopy in 2015 and 1 diminutive colon polyp which was removed.  She does have a history of peptic ulcer disease as well and has had endoscopies over 3 years ago, f/u with one small ulcer.  She denies NSAID usage.  She does take narcotics as needed.  Dicyclomine has been the most helpful medication treating this particular abdominal discomfort, but less efficacious more recently. She believes she had good response to Linzess given by [facility] provider.  No vomitng, nausea.  She has okay oral intake.  Her friend is here to give additional history.i     The following portions of the patient's history were reviewed and updated as appropriate: allergies, current medications, past family history, past medical history, past social history, past surgical history and problem list.     (Portions of this note were dictated using voice recognition software and may contain dictation related errors in spelling/grammar/syntax not found on text review)       HPI  Review of Systems   Cardiovascular: Negative for chest pain and leg swelling.   Gastrointestinal: Positive for abdominal distention and abdominal pain.       Objective:      Physical Exam   Constitutional: She is oriented to person, place, and time. She  appears well-developed and well-nourished. No distress.   HENT:   Head: Normocephalic and atraumatic.   Eyes: Conjunctivae are normal. No scleral icterus.   Pulmonary/Chest: Effort normal and breath sounds normal. No respiratory distress. She has no wheezes.   Abdominal: Soft. Bowel sounds are normal. She exhibits no distension. There is no tenderness.   Neurological: She is alert and oriented to person, place, and time.   Skin: Skin is warm and dry. No rash noted. She is not diaphoretic. No erythema.   Nursing note and vitals reviewed.      Assessment:       1. Irritable bowel syndrome with constipation        Plan:   1. Trial of Linzess  2. Pt to call later in the week with symptom update; has constipation predominance, but mixed picture. Failed numerous laxatives  3. ?contribution of fibroids to abdominal pain

## 2017-11-07 ENCOUNTER — NURSE TRIAGE (OUTPATIENT)
Dept: ADMINISTRATIVE | Facility: CLINIC | Age: 82
End: 2017-11-07

## 2017-11-07 ENCOUNTER — HOSPITAL ENCOUNTER (EMERGENCY)
Facility: OTHER | Age: 82
Discharge: HOME | End: 2017-11-07
Attending: EMERGENCY MEDICINE
Payer: MEDICARE

## 2017-11-07 VITALS
OXYGEN SATURATION: 98 % | DIASTOLIC BLOOD PRESSURE: 68 MMHG | HEART RATE: 84 BPM | RESPIRATION RATE: 20 BRPM | WEIGHT: 153 LBS | TEMPERATURE: 98 F | SYSTOLIC BLOOD PRESSURE: 134 MMHG | BODY MASS INDEX: 25.49 KG/M2 | HEIGHT: 65 IN

## 2017-11-07 DIAGNOSIS — R10.84 ABDOMINAL PAIN, ACUTE, GENERALIZED: Primary | ICD-10-CM

## 2017-11-07 DIAGNOSIS — R10.84 ABDOMINAL PAIN, CHRONIC, GENERALIZED: ICD-10-CM

## 2017-11-07 DIAGNOSIS — G89.29 ABDOMINAL PAIN, CHRONIC, GENERALIZED: ICD-10-CM

## 2017-11-07 DIAGNOSIS — H34.8322 BRANCH RETINAL VEIN OCCLUSION, LEFT EYE: ICD-10-CM

## 2017-11-07 LAB
ALBUMIN SERPL BCP-MCNC: 3.2 G/DL
ALP SERPL-CCNC: 63 U/L
ALT SERPL W/O P-5'-P-CCNC: 10 U/L
ANION GAP SERPL CALC-SCNC: 6 MMOL/L
AST SERPL-CCNC: 11 U/L
BASOPHILS # BLD AUTO: 0.01 K/UL
BASOPHILS NFR BLD: 0.1 %
BILIRUB SERPL-MCNC: 0.7 MG/DL
BILIRUB UR QL STRIP: NEGATIVE
BUN SERPL-MCNC: 8 MG/DL
CALCIUM SERPL-MCNC: 9.2 MG/DL
CHLORIDE SERPL-SCNC: 104 MMOL/L
CLARITY UR: CLEAR
CO2 SERPL-SCNC: 30 MMOL/L
COLOR UR: YELLOW
CREAT SERPL-MCNC: 0.7 MG/DL
DIFFERENTIAL METHOD: ABNORMAL
EOSINOPHIL # BLD AUTO: 0.1 K/UL
EOSINOPHIL NFR BLD: 1.6 %
ERYTHROCYTE [DISTWIDTH] IN BLOOD BY AUTOMATED COUNT: 13.9 %
EST. GFR  (AFRICAN AMERICAN): >60 ML/MIN/1.73 M^2
EST. GFR  (NON AFRICAN AMERICAN): >60 ML/MIN/1.73 M^2
GLUCOSE SERPL-MCNC: 99 MG/DL
GLUCOSE UR QL STRIP: NEGATIVE
HCT VFR BLD AUTO: 36.3 %
HGB BLD-MCNC: 11.4 G/DL
HGB UR QL STRIP: NEGATIVE
KETONES UR QL STRIP: NEGATIVE
LEUKOCYTE ESTERASE UR QL STRIP: NEGATIVE
LIPASE SERPL-CCNC: 17 U/L
LYMPHOCYTES # BLD AUTO: 1 K/UL
LYMPHOCYTES NFR BLD: 12.6 %
MCH RBC QN AUTO: 26.4 PG
MCHC RBC AUTO-ENTMCNC: 31.4 G/DL
MCV RBC AUTO: 84 FL
MONOCYTES # BLD AUTO: 1.2 K/UL
MONOCYTES NFR BLD: 15.7 %
NEUTROPHILS # BLD AUTO: 5.4 K/UL
NEUTROPHILS NFR BLD: 69.7 %
NITRITE UR QL STRIP: NEGATIVE
PH UR STRIP: 7 [PH] (ref 5–8)
PLATELET # BLD AUTO: 253 K/UL
PMV BLD AUTO: 8.9 FL
POTASSIUM SERPL-SCNC: 3.5 MMOL/L
PROT SERPL-MCNC: 7.4 G/DL
PROT UR QL STRIP: NEGATIVE
RBC # BLD AUTO: 4.32 M/UL
SODIUM SERPL-SCNC: 140 MMOL/L
SP GR UR STRIP: <=1.005 (ref 1–1.03)
URN SPEC COLLECT METH UR: ABNORMAL
UROBILINOGEN UR STRIP-ACNC: NEGATIVE EU/DL
WBC # BLD AUTO: 7.72 K/UL

## 2017-11-07 PROCEDURE — 25000003 PHARM REV CODE 250: Performed by: EMERGENCY MEDICINE

## 2017-11-07 PROCEDURE — 83690 ASSAY OF LIPASE: CPT

## 2017-11-07 PROCEDURE — 80053 COMPREHEN METABOLIC PANEL: CPT

## 2017-11-07 PROCEDURE — 85025 COMPLETE CBC W/AUTO DIFF WBC: CPT

## 2017-11-07 PROCEDURE — 63600175 PHARM REV CODE 636 W HCPCS: Performed by: EMERGENCY MEDICINE

## 2017-11-07 PROCEDURE — 81003 URINALYSIS AUTO W/O SCOPE: CPT

## 2017-11-07 PROCEDURE — 99283 EMERGENCY DEPT VISIT LOW MDM: CPT | Mod: 25

## 2017-11-07 PROCEDURE — 96374 THER/PROPH/DIAG INJ IV PUSH: CPT

## 2017-11-07 RX ORDER — ACETAMINOPHEN 325 MG/1
650 TABLET ORAL
Status: COMPLETED | OUTPATIENT
Start: 2017-11-07 | End: 2017-11-07

## 2017-11-07 RX ORDER — KETOROLAC TROMETHAMINE 30 MG/ML
15 INJECTION, SOLUTION INTRAMUSCULAR; INTRAVENOUS
Status: COMPLETED | OUTPATIENT
Start: 2017-11-07 | End: 2017-11-07

## 2017-11-07 RX ADMIN — ACETAMINOPHEN 650 MG: 325 TABLET ORAL at 12:11

## 2017-11-07 RX ADMIN — KETOROLAC TROMETHAMINE 15 MG: 30 INJECTION, SOLUTION INTRAMUSCULAR at 12:11

## 2017-11-07 NOTE — ED TRIAGE NOTES
Pt reports to ED c/o abd pain and distention. Pt recently diagnosed with IBS x 4-5 months ago, started first dose of Linzess dose today. Admits some nausea, urinary frequency/urgency, denies V/D, blood in stool, flatus, fevers, chills, dysuria/hematuria, chest pain, SOB. AAO x  4.

## 2017-11-07 NOTE — ED PROVIDER NOTES
"Encounter Date: 11/7/2017    SCRIBE #1 NOTE: I, Sugar Gagnon, am scribing for, and in the presence of,  Dr. Arora. I have scribed the entire note.       History     Chief Complaint   Patient presents with    Abdominal Pain     History of IBS.  Was seen yesterday and was given a new prescription for Linzess.  This condition has been going on for last few months.       Time seen by provider: 11:37 AM    This is a 82 y.o. female who presents with complaint of generalized abdominal pain. She reports onset of symptoms was a few months ago. Prior to onset of symptoms the patient was diagnosed with IBS about 4-5 months ago. She reports she was evaluated by Dr. Morton of GI yesterday for symptoms. The patient notes at that time she was started on Linzess. She has only had one dose of the medication. However, over the last 3 days the pain has become significantly worse. She describes the pain as "fullness". Though pain is  rated as greater than 10/10 at worst. Currently pain is rated as 7/10. The patient does note she has chronic constipation for which she takes a laxative. She states her last bowel movement was 2 days ago. The patient denies any associated nausea, vomiting, diarrhea, urinary symptoms, fever or chills. She states in the past she has taken Tylenol for pain but did not take any for pain experienced over the last few days; as she believed the medication would not improve the pain. She reports the abdominal pain is similar to previous episodes of pain with diagnosis of IBS but more severe.       The history is provided by the patient.     Review of patient's allergies indicates:  No Known Allergies  Past Medical History:   Diagnosis Date    Anemia     Arthritis     Hashimoto's disease     Hypertension     IGT (impaired glucose tolerance) 1/12/2016    Joint pain     Multinodular goiter 1/12/2016     Past Surgical History:   Procedure Laterality Date    CATARACT EXTRACTION      COLONOSCOPY N/A " 9/29/2015    Procedure: COLONOSCOPY;  Surgeon: FIDELINA Sanchez MD;  Location: [facility] ([address] [place] [address]);  Service: Endoscopy;  Laterality: N/A;    JOINT REPLACEMENT      right knee    KNEE SURGERY Left 12/31/2013    TKR    left parotidectomy      mixed tumor    SALIVARY GLAND SURGERY      TONSILLECTOMY       Family History   Problem Relation Age of Onset    Hypertension Mother     Prostate cancer Father      prostate cancer    Breast cancer Maternal Grandmother     Lupus Neg Hx     Psoriasis Neg Hx     Melanoma Neg Hx     Colon cancer Neg Hx      Social History   Substance Use Topics    Smoking status: Never Smoker    Smokeless tobacco: Never Used    Alcohol use No      Comment: very seldom      Review of Systems   Constitutional: Negative for chills and fever.   Eyes: Negative for visual disturbance.   Respiratory: Negative for cough and shortness of breath.    Cardiovascular: Negative for chest pain and palpitations.   Gastrointestinal: Positive for abdominal pain and constipation (chronic). Negative for diarrhea and vomiting.   Genitourinary: Negative for dysuria and vaginal discharge.   Musculoskeletal: Negative for joint swelling, neck pain and neck stiffness.   Skin: Negative for rash.   Neurological: Negative for weakness, numbness and headaches.   Psychiatric/Behavioral: Negative for confusion.       Physical Exam     Initial Vitals [11/07/17 1113]   BP Pulse Resp Temp SpO2   (!) 140/93 88 20 98.3 °F (36.8 °C) 97 %      MAP       108.67         Physical Exam    Nursing note and vitals reviewed.  Constitutional: She appears well-developed and well-nourished. She is not diaphoretic. No distress.   HENT:   Head: Normocephalic and atraumatic.   Right Ear: External ear normal.   Left Ear: External ear normal.   Eyes: Conjunctivae and EOM are normal.   Neck: Normal range of motion. Neck supple.   Cardiovascular: Normal rate, regular rhythm and normal heart sounds. Exam reveals no gallop and no friction  rub.    No murmur heard.  Pulmonary/Chest: Breath sounds normal. She has no wheezes. She has no rhonchi. She has no rales.   Abdominal: Soft. Bowel sounds are normal. There is no tenderness. There is no rebound and no guarding.   Musculoskeletal: Normal range of motion. She exhibits no edema or tenderness.   No calf tenderness. No LE pitting edema.    Lymphadenopathy:     She has no cervical adenopathy.   Neurological: She is alert and oriented to person, place, and time. She has normal strength.   Skin: Skin is warm and dry. No rash noted.         ED Course   Procedures  Labs Reviewed   CBC W/ AUTO DIFFERENTIAL - Abnormal; Notable for the following:        Result Value    Hemoglobin 11.4 (*)     Hematocrit 36.3 (*)     MCH 26.4 (*)     MCHC 31.4 (*)     MPV 8.9 (*)     Mono # 1.2 (*)     Lymph% 12.6 (*)     Mono% 15.7 (*)     All other components within normal limits   COMPREHENSIVE METABOLIC PANEL - Abnormal; Notable for the following:     CO2 30 (*)     Albumin 3.2 (*)     Anion Gap 6 (*)     All other components within normal limits   URINALYSIS - Abnormal; Notable for the following:     Specific Gravity, UA <=1.005 (*)     All other components within normal limits   LIPASE             Medical Decision Making:   ED Management:  Emergent evaluation of 82-year-old female with complaint of acute on chronic abdominal pain.  Vital signs are benign, afebrile.  Physical exam revealed a completely soft abdomen with absolutely no tenderness to palpation whatsoever.  She was treated in the ER with a single dose of Tylenol and Toradol.  Labs are benign with no leukocytosis, UTI, electrolyte disturbance or renal failure.  I'm comfortable with discharge home.  She is encouraged to continue taking Linzess as she has only had 1 dose, and to follow-up with her PCP and GI doctor or return for new or worsening symptoms.            Scribe Attestation:   Scribe #1: I performed the above scribed service and the documentation  accurately describes the services I performed. I attest to the accuracy of the note.    I, Dr. Brooke Arora, personally performed the services described in this documentation. All medical record entries made by the scribe were at my direction and in my presence.  I have reviewed the chart and agree that the record reflects my personal performance and is accurate and complete. Brooke Arora MD.  4:21 PM 11/07/2017          ED Course      Clinical Impression:     1. Abdominal pain, acute, generalized        2. Abdominal pain, chronic, generalized                                     Brooke Arora MD  11/07/17 0519

## 2017-11-07 NOTE — ED NOTES
Rounding completed on pt. Pt updated on plan of care, denies any complaints at this time. Bed in lowest, locked position, side rails up x 2. Call light within reach.

## 2017-11-07 NOTE — TELEPHONE ENCOUNTER
Reason for Disposition   SEVERE abdominal pain (e.g., excruciating)    Protocols used: ST ABDOMINAL PAIN - UPPER-A-OH    Selma is calling crying to report severe abdominal pain.  She can not stop crying long enough to talk.  Advised ER.  Keeps crying and reports has been to ER and they do not help her.  Again advised ER.  She responded thank you I appreciate you speaking to me and call ended.  Please contact Selma at [phone] to advise.

## 2017-11-08 ENCOUNTER — OUTPATIENT CASE MANAGEMENT (OUTPATIENT)
Dept: ADMINISTRATIVE | Facility: OTHER | Age: 82
End: 2017-11-08

## 2017-11-08 ENCOUNTER — OFFICE VISIT (OUTPATIENT)
Dept: NEUROLOGY | Facility: CLINIC | Age: 82
End: 2017-11-08
Payer: MEDICARE

## 2017-11-08 VITALS
DIASTOLIC BLOOD PRESSURE: 62 MMHG | WEIGHT: 156 LBS | SYSTOLIC BLOOD PRESSURE: 106 MMHG | HEART RATE: 86 BPM | HEIGHT: 65 IN | BODY MASS INDEX: 25.99 KG/M2

## 2017-11-08 DIAGNOSIS — G25.5 HEMICHOREA: ICD-10-CM

## 2017-11-08 DIAGNOSIS — R53.81 DEBILITY: ICD-10-CM

## 2017-11-08 DIAGNOSIS — F39 MOOD DISORDER: ICD-10-CM

## 2017-11-08 PROCEDURE — 99999 PR PBB SHADOW E&M-EST. PATIENT-LVL III: CPT | Mod: PBBFAC,GC,, | Performed by: STUDENT IN AN ORGANIZED HEALTH CARE EDUCATION/TRAINING PROGRAM

## 2017-11-08 PROCEDURE — 99213 OFFICE O/P EST LOW 20 MIN: CPT | Mod: PBBFAC | Performed by: STUDENT IN AN ORGANIZED HEALTH CARE EDUCATION/TRAINING PROGRAM

## 2017-11-08 PROCEDURE — 99214 OFFICE O/P EST MOD 30 MIN: CPT | Mod: S$PBB,GC,, | Performed by: STUDENT IN AN ORGANIZED HEALTH CARE EDUCATION/TRAINING PROGRAM

## 2017-11-08 RX ORDER — DIAZEPAM 2 MG/1
2 TABLET ORAL 2 TIMES DAILY
Qty: 60 TABLET | Refills: 0 | Status: SHIPPED | OUTPATIENT
Start: 2017-11-08 | End: 2018-02-09 | Stop reason: SDUPTHER

## 2017-11-08 NOTE — PROGRESS NOTES
Thank you for the referral. The following patient has been assigned to Domonique Baez RN with [facility] for high risk screening.    Reason for referral:   Branch retinal vein occlusion, left eye    [facility] at [phone] with any questions.    Thank you,  Laura Hernandez

## 2017-11-08 NOTE — PROGRESS NOTES
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       Dr. 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  At her last visit with Dr. Giang, she was started on valium 2 mg BID, which she states she has taken very inconsistently, if at all.  She reports no significant improvement in chorea to date.  Prior authorization for tetrabenazine was attempted, but unsuccessful.  As a result, we attempted to have her started on austedo.  Her austedo was delivered for her to begin taking, but the next day upon reviewing the medication, she called the clinic in significant distress that she was feeling overwhelmed and that it was too much for her.  She was asking for inpt rehab/nursing home placement at that time.  I was concerned that this may be indicative of a mood disorder, and that she may potentially be dangerous to herself (notably she denied any ideation/plan for SIHI at that time).  I advised her to call 911 and present to the ED.  On follow up call later that day, her mood was significantly improved and she declined to come to the ED.  I had another extended phone call with both the pt and her daughter, where I recommended that she NOT take austedo for concerns of her mood disorder, recommended she f/u with psychology and referral to psychiatry, and return to clinic for further management.    She presents today in significantly better spirits than she was while on the phone call described above.  She was recently seen by GI and then in the ED in the 2 days preceding her clinic visit today for abdominal pain (discharged home, advised to continue taking Linzess).      With  "regards to her hemichorea, her symptoms continue to persist, stable from prior.  She reports that she last took valium 1-2 weeks ago.  She initially states that she couldn't tell if it made a difference, however upon further questioning she states that it may have helped, and she would like to continue for now.    She also reports she has been participating in physical therapy.  She has f/u with outpt psychiatrist, Dr. Corea, with whom she has had two visits. Dr. Lux reports she is pleased with Dr. Corea, and believes these sessions are helpful.    Has psychiatrist appt for 2/2018.      She reports her mood is "quite good," denies any SIHI at this time.    Review of Systems:  Constitutional: no fever or chills  Eyes: no visual changes  ENT: no nasal congestion or sore throat  Respiratory: no cough or shortness of breath  Cardiovascular: no chest pain or palpitations  Gastrointestinal: positive for abd pain, improved from recent days  Genitourinary: no hematuria or dysuria  Integument/Breast: no rash or pruritis  Musculoskeletal: no arthralgias or myalgias  Neurological: positive for hemichorea  Behavioral/Psych: no auditory or visual hallucinations    Past Medical History  Past Medical History:   Diagnosis Date    Anemia     Arthritis     Hashimoto's disease     Hypertension     IGT (impaired glucose tolerance) 1/12/2016    Joint pain     Multinodular goiter 1/12/2016       Medications    Current Outpatient Prescriptions:     amlodipine (NORVASC) 5 MG tablet, TAKE 1 TABLET DAILY, Disp: 90 tablet, Rfl: 2    ferrous sulfate 325 (65 FE) MG EC tablet, Take 325 mg by mouth once daily. , Disp: , Rfl:     gabapentin (NEURONTIN) 300 MG capsule, Take 1 capsule (300 mg total) by mouth 3 (three) times daily., Disp: 60 capsule, Rfl: 11    hydroxychloroquine (PLAQUENIL) 200 mg tablet, TAKE 2 TABLETS ONCE DAILY, Disp: 180 tablet, Rfl: 1    linaclotide (LINZESS) 290 mcg Cap, Take 1 capsule (290 mcg total) by " mouth once daily., Disp: 30 capsule, Rfl: 0    predniSONE (DELTASONE) 5 MG tablet, TAKE 2 TABLETS ONCE DAILY (Patient taking differently: TAKE 1 TABLETS ONCE DAILY), Disp: 180 tablet, Rfl: 0    thiamine 100 MG tablet, Take 1 tablet (100 mg total) by mouth once daily., Disp: , Rfl:     deutetrabenazine (AUSTEDO) 9 mg Tab, Take 2 tablets by mouth 2 (two) times daily. Final titrating dose - needs initially titration pack from company, Disp: 120 tablet, Rfl: 11    diazePAM (VALIUM) 2 MG tablet, Take 1 tablet (2 mg total) by mouth 2 (two) times daily., Disp: 60 tablet, Rfl: 0    dicyclomine (BENTYL) 20 mg tablet, Take 1 tablet (20 mg total) by mouth every 6 (six) hours as needed., Disp: 360 tablet, Rfl: 0    hydrocodone-acetaminophen 5-325mg (NORCO) 5-325 mg per tablet, Take 1 tablet by mouth every 24 hours as needed for Pain., Disp: 30 tablet, Rfl: 0    montelukast (SINGULAIR) 10 mg tablet, TAKE 1 TABLET EVERY EVENING, Disp: 90 tablet, Rfl: 2  Any other notable medications as documented in HPI    Allergies  Review of patient's allergies indicates:  No Known Allergies    Social History  Social History     Social History    Marital status:      Spouse name: N/A    Number of children: N/A    Years of education: N/A     Occupational History    Not on file.     Social History Main Topics    Smoking status: Never Smoker    Smokeless tobacco: Never Used    Alcohol use No      Comment: very seldom     Drug use: No    Sexual activity: No      Comment: ,  age 52,      Other Topics Concern    Not on file     Social History Narrative    No narrative on file     Any other notable Social History as documented in HPI.    Family History  Family History   Problem Relation Age of Onset    Hypertension Mother     Prostate cancer Father      prostate cancer    Breast cancer Maternal Grandmother     Lupus Neg Hx     Psoriasis Neg Hx     Melanoma Neg Hx     Colon cancer Neg Hx      Any other notable  "FMH as documented in HPI.    Physical Exam  /62 (BP Location: Left arm, Patient Position: Sitting, BP Method: Large (Automatic))   Pulse 86   Ht 5' 5" (1.651 m)   Wt 70.8 kg (156 lb)   BMI 25.96 kg/m²     General: Well-developed, well-groomed. No apparent distress  Cardiovascular: Regular rate and rhythm with no murmurs, rubs or gallops.  Chest: Lungs clear to auscultation bilaterally.  No wheezes, stridor, ronchi appreciated.  Abdomen: Normoactive bowel sounds present.  Soft, nontender to palpation.  Extremities: No peripheral edema     Neurologic Exam: The patient is awake, alert and oriented. Language is fluent.  Fund of knowledge is appropriate.      Cranial nerves:   Pupils are round and reactive to light and accommodation.  Visual fields are full to confrontation.    Ocular motility is full in all cardinal positions of gaze.   Facial sensation is normal to light touch.   Facial activation is symmetric.   Hearing is symmetric bilaterally.   Palate elevates symmetrically.   Shoulder elevation is symmetric and full strength bilaterally.   Tongue is midline and neck rotation strength is normal bilaterally.      Motor examination L-sided hemichorea, occasional blepharospasm of L eye.  Strength is 4+/5 in the upper and lower extremities bilaterally.      Sensory examination is normal light touch in BUE and BLE.     Deep tendon reflexes are 2+ and symmetric in the upper and lower extremities bilaterally.  No clonus, downgoing toes b/l.     Gait: Unstable casual gait, ambulates with cane at baseline.      Coordination: Finger to nose and heel to shin testing is normal in both upper and lower extremities.      Lab and Test Results    WBC   Date Value Ref Range Status   11/07/2017 7.72 3.90 - 12.70 K/uL Final   09/07/2017 11.02 3.90 - 12.70 K/uL Final   08/24/2017 6.97 3.90 - 12.70 K/uL Final     Hemoglobin   Date Value Ref Range Status   11/07/2017 11.4 (L) 12.0 - 16.0 g/dL Final   09/07/2017 12.6 12.0 - 16.0 " g/dL Final   08/24/2017 11.5 (L) 12.0 - 16.0 g/dL Final     Hematocrit   Date Value Ref Range Status   11/07/2017 36.3 (L) 37.0 - 48.5 % Final   09/07/2017 40.5 37.0 - 48.5 % Final   08/24/2017 36.2 (L) 37.0 - 48.5 % Final     Platelets   Date Value Ref Range Status   11/07/2017 253 150 - 350 K/uL Final   09/07/2017 278 150 - 350 K/uL Final   08/24/2017 286 150 - 350 K/uL Final     Glucose   Date Value Ref Range Status   11/07/2017 99 70 - 110 mg/dL Final   09/07/2017 106 70 - 110 mg/dL Final   08/03/2017 107 70 - 110 mg/dL Final     Sodium   Date Value Ref Range Status   11/07/2017 140 136 - 145 mmol/L Final   09/07/2017 142 136 - 145 mmol/L Final   08/03/2017 143 136 - 145 mmol/L Final     Potassium   Date Value Ref Range Status   11/07/2017 3.5 3.5 - 5.1 mmol/L Final   09/07/2017 3.6 3.5 - 5.1 mmol/L Final   08/03/2017 4.1 3.5 - 5.1 mmol/L Final     Chloride   Date Value Ref Range Status   11/07/2017 104 95 - 110 mmol/L Final   09/07/2017 105 95 - 110 mmol/L Final   08/03/2017 105 95 - 110 mmol/L Final     CO2   Date Value Ref Range Status   11/07/2017 30 (H) 23 - 29 mmol/L Final   09/07/2017 25 23 - 29 mmol/L Final   08/03/2017 26 23 - 29 mmol/L Final     BUN, Bld   Date Value Ref Range Status   11/07/2017 8 8 - 23 mg/dL Final   09/07/2017 12 8 - 23 mg/dL Final   08/03/2017 15 8 - 23 mg/dL Final     Creatinine   Date Value Ref Range Status   11/07/2017 0.7 0.5 - 1.4 mg/dL Final   09/07/2017 0.9 0.5 - 1.4 mg/dL Final   08/03/2017 0.8 0.5 - 1.4 mg/dL Final     Calcium   Date Value Ref Range Status   11/07/2017 9.2 8.7 - 10.5 mg/dL Final   09/07/2017 9.7 8.7 - 10.5 mg/dL Final   08/03/2017 9.5 8.7 - 10.5 mg/dL Final     Magnesium   Date Value Ref Range Status   02/04/2015 1.9 1.6 - 2.6 mg/dL Final   05/02/2011 2.1 1.6 - 2.6 mg/dl Final     Phosphorus   Date Value Ref Range Status   05/02/2011 3.1 2.7 - 4.5 mg/dl Final     Alkaline Phosphatase   Date Value Ref Range Status   11/07/2017 63 55 - 135 U/L Final    09/07/2017 66 55 - 135 U/L Final   08/03/2017 66 55 - 135 U/L Final     ALT   Date Value Ref Range Status   11/07/2017 10 10 - 44 U/L Final   09/07/2017 9 (L) 10 - 44 U/L Final   08/03/2017 10 10 - 44 U/L Final     AST   Date Value Ref Range Status   11/07/2017 11 10 - 40 U/L Final   09/07/2017 15 10 - 40 U/L Final   08/03/2017 12 10 - 40 U/L Final         Images:  No recent pertinent imaging.    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    PMH of HTN, Hashimoto's as above, is currently stable.    Recommendations & Plan:  -Continue valium 2 mg BID  -DO NOT take austedo  -F/u with psychology and psychiatry as below  -Will work with clinic to arrange for inpt rehab placement              Psychiatric    Mood disorder    Current Assessment & Plan     Likely contributing to deconditioning and difficulty in managing herself at home alone.    -AVOID austedo for now (dopamine-depleting)  -Continue to f/u with outpt psychologist Dr. Corea  -F/u with psychiatry (appt scheduled 2/2018)            Other    Debility    Current Assessment & Plan     Likely combination of deconditioning, hemichorea (management as above)  -Continue PT  -Requires a cane at baseline  -MA to work on inpt rehab placement   -Pt is eager for assistance and would prefer inpt rehab assistance.                 Branden Rubio MD  Neurology Resident   [facility]  [address] [place] [id number]

## 2017-11-08 NOTE — PATIENT INSTRUCTIONS
-Continue valium 2 mg BID for hemichorea  -DO NOT take austedo  -F/u with psychology  -Psychiatry appt scheduled for 2/7/18  -Will work with clinic to arrange for inpt rehab placement  -Return to clinic in 4 months

## 2017-11-09 ENCOUNTER — CLINICAL SUPPORT (OUTPATIENT)
Dept: REHABILITATION | Facility: HOSPITAL | Age: 82
End: 2017-11-09
Attending: STUDENT IN AN ORGANIZED HEALTH CARE EDUCATION/TRAINING PROGRAM
Payer: MEDICARE

## 2017-11-09 DIAGNOSIS — Z74.09 IMPAIRED FUNCTIONAL MOBILITY, BALANCE, GAIT, AND ENDURANCE: ICD-10-CM

## 2017-11-09 PROCEDURE — 97112 NEUROMUSCULAR REEDUCATION: CPT | Mod: PO

## 2017-11-09 PROCEDURE — 97110 THERAPEUTIC EXERCISES: CPT | Mod: PO

## 2017-11-09 NOTE — PROGRESS NOTES
"                                                  Physical Therapy Progress Note     Name: Selma Alonzo Lux MD  Clinic Number: [id number]  Diagnosis:   No diagnosis found.  Physician: Branden Rubio MD  Treatment Orders: PT Eval and Treat  Past Medical History:   Diagnosis Date    Anemia     Arthritis     Hashimoto's disease     Hypertension     IGT (impaired glucose tolerance) 1/12/2016    Joint pain     Multinodular goiter 1/12/2016       Evaluation Date: 10/10/2017  Visit #: 4  Plan of care expiration: 12/5/2017  Precautions: universal, visual impairment    G codes 4/10    FGA Not seeing SLP     Subjective   Pt reports: " I was rushing and running a little late and my neck is killing me today."    Pain Scale:  10/10 L side of cervical region  Pt late to session but able to accommodate.     Objective     Patient received individual therapy with activities as follows:     Patient participated in neuromuscular re-education activities to improve: Balance, Coordination, Kinesthetic and Proprioception for 25 minutes. The following activities were included:     Dynamic Balance activities at parallel bars with gait belt and CGA/SBA    X 30 reps of B LE heel raises   2 x 10 reps of 2 target taps with unilateral support and 1 UE support   4 x 30 sec of tandem stance     4 point foam fitter board:    2 x 30 sec of static stand with arms crossed    X 60 sec of static standing with head turns medial/lateral               Selma received therapeutic exercises to develop strength, endurance and flexibility for 15 minutes including:  x10 mins Nustep on Level 2.0 Alexander UE and LE to improve CV endurance .  Moist heat to cervical region while on stepper.    3 x 30 sec of B LE standing HC stretches on incline     Patient requested one short seated rest break during session today.      Written Home Exercises: pt educated on walking daily 5-10 mins daily between sessions  Pt demo fair understanding of the education " provided. Selma demonstrated fair return demonstration of activities.     Education provided re: POC, HEP  No spiritual or educational barriers to learning provided    Pt has no cultural, educational or language barriers to learning provided.    Assessment   Selma tolerated tx session fairly well despite cervical neck pain and feeling fatigued.  Pt was able to perform 10 min on the stepper and dynamic standing balance with only 1 sitting rest break.  Pt required CGA for most activities for safety and did not have any loss of balance.   Patient would continue to benefit from physical therapy with focus on static and dynamic balance, aerobic exercise, gait training, and functional mobility training to address above deficits.    Pt rehab potential is Fair. Pt will benefit from continuing skilled outpatient physical therapy to address the deficits listed below in the problem list, provide pt/family education and to maximize pt's level of independence in the home and community environment.      History  Co-morbidities and personal factors that may impact the plan of care Examination  Body Structures and Functions, activity limitations and participation restrictions that may impact the plan of care. Medical necessity is demonstrated by the following impairments. Clinical Presentation Decision Making/ Complexity Score   1. OA of multiple joints  2. LBP  3. Visual deficits  4. Cognitive deficits 1. Decreased balance  2. Gait deficits  3. Increased fall risk  4. Decreased LE strength  5. Impaired endurance  6. Postural deficits evolving-unpredictable symptom presentation, cognitive deficits     high high moderate moderate         Pt's spiritual, cultural and educational needs considered and pt agreeable to plan of care and goals as stated below:      GOALS:   Short term goals: 4 weeks, pt agrees to goals set.  7. Pt to report performing HEP daily to increased IND.  8. Pt to demonstrate nadiya hip flexion strength to 4+/5 to  improve functional mobility.   9. Pt to demonstrate nadiya hip abduction strength to 4+/5 to improve functional mobility.   10. Pt to score >20/30 on FGA to improve dynamic balance     Long term goals: 8 weeks, pt agrees to goals set  11. Pt to be 100% compliant and IND with HEP.  12. Pt to demonstrate more upright standing/seated head/shoulder posture to improve balance.  13. Pt to improve SSWS to at least 0.86m/sec for improved safety with community ambulation.   14. Pt to demonstrate nadiya hip flexion strength of 5/5 to improve functional mobility.  15. Pt to demonstrate nadiya hip abduction strength to 5/5 to improve functional mobility.  16. Pt to demonstrate increased step width at least 75% of trial during ambulation to improve Tristan and decrease fall risk.   17. Pt to score >26/30 on FGA to improve dynamic balance  18. Pt to improve FOTO score to at least 60% for improved self concept with functional mobility.        Plan   Continue PT 2x weekly under established Plan of Care, with treatment to include: pt education, HEP, therapeutic exercises, neuromuscular re-education/balance exercises, therapeutic activities, joint mobilizations, and modalities PRN, to work towards established goals. Pt may be seen by PTA to carry out plan of care.     Katerine Mchugh, PTA   11/09/2017

## 2017-11-09 NOTE — ASSESSMENT & PLAN NOTE
Likely contributing to deconditioning and difficulty in managing herself at home alone.    -AVOID austedo for now (dopamine-depleting)  -Continue to f/u with outpt psychologist Dr. Corea  -F/u with psychiatry (appt scheduled 2/2018)

## 2017-11-09 NOTE — ASSESSMENT & PLAN NOTE
Likely combination of deconditioning, hemichorea (management as above)  -Continue PT  -Requires a cane at baseline  -MA to work on inpt rehab placement   -Pt is eager for assistance and would prefer inpt rehab assistance.

## 2017-11-09 NOTE — ASSESSMENT & PLAN NOTE
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    PMH of HTN, Hashimoto's as above, is currently stable.    Recommendations & Plan:  -Continue valium 2 mg BID  -DO NOT take austedo  -F/u with psychology and psychiatry as below  -Will work with clinic to arrange for inpt rehab placement

## 2017-11-14 ENCOUNTER — TELEPHONE (OUTPATIENT)
Dept: INTERNAL MEDICINE | Facility: CLINIC | Age: 82
End: 2017-11-14

## 2017-11-14 ENCOUNTER — OFFICE VISIT (OUTPATIENT)
Dept: INTERNAL MEDICINE | Facility: CLINIC | Age: 82
End: 2017-11-14
Payer: MEDICARE

## 2017-11-14 ENCOUNTER — CLINICAL SUPPORT (OUTPATIENT)
Dept: REHABILITATION | Facility: HOSPITAL | Age: 82
End: 2017-11-14
Attending: STUDENT IN AN ORGANIZED HEALTH CARE EDUCATION/TRAINING PROGRAM
Payer: MEDICARE

## 2017-11-14 VITALS
HEIGHT: 65 IN | OXYGEN SATURATION: 96 % | SYSTOLIC BLOOD PRESSURE: 116 MMHG | BODY MASS INDEX: 25.34 KG/M2 | HEART RATE: 104 BPM | DIASTOLIC BLOOD PRESSURE: 66 MMHG | WEIGHT: 152.13 LBS

## 2017-11-14 DIAGNOSIS — Z74.09 IMPAIRED FUNCTIONAL MOBILITY, BALANCE, GAIT, AND ENDURANCE: ICD-10-CM

## 2017-11-14 DIAGNOSIS — B02.9 HERPES ZOSTER WITHOUT COMPLICATION: Primary | ICD-10-CM

## 2017-11-14 PROCEDURE — 99213 OFFICE O/P EST LOW 20 MIN: CPT | Mod: S$PBB,,, | Performed by: INTERNAL MEDICINE

## 2017-11-14 PROCEDURE — 97110 THERAPEUTIC EXERCISES: CPT | Mod: PO

## 2017-11-14 PROCEDURE — 99213 OFFICE O/P EST LOW 20 MIN: CPT | Mod: PBBFAC | Performed by: INTERNAL MEDICINE

## 2017-11-14 PROCEDURE — 99999 PR PBB SHADOW E&M-EST. PATIENT-LVL III: CPT | Mod: PBBFAC,,, | Performed by: INTERNAL MEDICINE

## 2017-11-14 RX ORDER — VALACYCLOVIR HYDROCHLORIDE 1 G/1
1000 TABLET, FILM COATED ORAL 3 TIMES DAILY
Qty: 21 TABLET | Refills: 0 | Status: SHIPPED | OUTPATIENT
Start: 2017-11-14 | End: 2018-02-12 | Stop reason: ALTCHOICE

## 2017-11-14 NOTE — TELEPHONE ENCOUNTER
----- Message from Bess Hare sent at 11/14/2017 10:01 AM CST -----  Called stating that the appt today with Dr Fay is fine.    Thank You

## 2017-11-14 NOTE — PROGRESS NOTES
"                                                  Physical Therapy Progress Note     Name: Selma Alonzo Lux MD  Clinic Number: [id number]  Diagnosis:   Encounter Diagnosis   Name Primary?    Impaired functional mobility, balance, gait, and endurance      Physician: Branden Rubio MD  Treatment Orders: PT Eval and Treat  Past Medical History:   Diagnosis Date    Anemia     Arthritis     Hashimoto's disease     Hypertension     IGT (impaired glucose tolerance) 1/12/2016    Joint pain     Multinodular goiter 1/12/2016       Evaluation Date: 10/10/2017  Visit #: 5  Plan of care expiration: 12/5/2017  Precautions: universal, visual impairment    G codes 5/10    FGA Not seeing SLP     Subjective   Pt reports: " I really tired today.  I had a hard time getting out of the tub this morning."   Pain Scale:  10/10 whole L side      Objective     Patient received individual therapy with activities as follows:     Patient participated in neuromuscular re-education activities to improve: Balance, Coordination, Kinesthetic and Proprioception for 25 minutes. The following activities were included:             Selma received therapeutic exercises to develop strength, endurance and flexibility for 15 minutes including:  x10 mins Nustep on Level 2.0 Alexander UE and LE to improve CV endurance .  Moist heat to cervical region while on stepper.    Supine therex:  X 20 reps of bridges with 3 sec hold  X 2 min of LTR   X 10 reps of B LE SLR      Written Home Exercises: pt educated on walking daily 5-10 mins daily between sessions  Pt demo fair understanding of the education provided. Selma demonstrated fair return demonstration of activities.     Education provided re: POC, HEP  No spiritual or educational barriers to learning provided    Pt has no cultural, educational or language barriers to learning provided.    Assessment   Selma tolerated tx session fairly . Pt arrived with an increased forward flexed posture with some " increased ataxic movements ambulating into the tx session.  Pt reported increased fatigue and had increased L sided sultana chorea tremors and pain on the L Side.  Performed most supine therex for safety. Pt required multiple rest breaks during tx session and began to fall asleep on the mat at the end of the tx session.    Patient would continue to benefit from physical therapy with focus on static and dynamic balance, aerobic exercise, gait training, and functional mobility training to address above deficits.    Pt rehab potential is Fair. Pt will benefit from continuing skilled outpatient physical therapy to address the deficits listed below in the problem list, provide pt/family education and to maximize pt's level of independence in the home and community environment.      History  Co-morbidities and personal factors that may impact the plan of care Examination  Body Structures and Functions, activity limitations and participation restrictions that may impact the plan of care. Medical necessity is demonstrated by the following impairments. Clinical Presentation Decision Making/ Complexity Score   1. OA of multiple joints  2. LBP  3. Visual deficits  4. Cognitive deficits 1. Decreased balance  2. Gait deficits  3. Increased fall risk  4. Decreased LE strength  5. Impaired endurance  6. Postural deficits evolving-unpredictable symptom presentation, cognitive deficits     high high moderate moderate         Pt's spiritual, cultural and educational needs considered and pt agreeable to plan of care and goals as stated below:      GOALS:   Short term goals: 4 weeks, pt agrees to goals set.  7. Pt to report performing HEP daily to increased IND.  8. Pt to demonstrate nadiya hip flexion strength to 4+/5 to improve functional mobility.   9. Pt to demonstrate nadiya hip abduction strength to 4+/5 to improve functional mobility.   10. Pt to score >20/30 on FGA to improve dynamic balance     Long term goals: 8 weeks, pt agrees to  goals set  11. Pt to be 100% compliant and IND with HEP.  12. Pt to demonstrate more upright standing/seated head/shoulder posture to improve balance.  13. Pt to improve SSWS to at least 0.86m/sec for improved safety with community ambulation.   14. Pt to demonstrate nadiya hip flexion strength of 5/5 to improve functional mobility.  15. Pt to demonstrate nadiya hip abduction strength to 5/5 to improve functional mobility.  16. Pt to demonstrate increased step width at least 75% of trial during ambulation to improve Tristan and decrease fall risk.   17. Pt to score >26/30 on FGA to improve dynamic balance  18. Pt to improve FOTO score to at least 60% for improved self concept with functional mobility.        Plan   Continue PT 2x weekly under established Plan of Care, with treatment to include: pt education, HEP, therapeutic exercises, neuromuscular re-education/balance exercises, therapeutic activities, joint mobilizations, and modalities PRN, to work towards established goals. Pt may be seen by PTA to carry out plan of care.     Katerine Mchugh, PTA   11/14/2017

## 2017-11-14 NOTE — TELEPHONE ENCOUNTER
----- Message from Santana Rogersr sent at 11/13/2017  3:19 PM CST -----  Contact: Vania/ Sister/ [phone] cell  Pt's sister is calling to get some advisement and recommendations for what could possibly be shingles.  The pt's sister noticed the rash on the pt's right arm, mid back, and neck.  The rash is painful.  She would like to speak with Dr. Hirsch today, if possible.  Please call and advise.    Thank you

## 2017-11-14 NOTE — TELEPHONE ENCOUNTER
Spoke w/ pts sister. She states that the pt has developed a rash on her arm and back. She states that the pt is not complaining. Advised her that the pt should see someone to be evaluated. Scheduled pt for an urgent care appointment with Dr. Fay today. pts sister stated that she will call back to inform if that appt is okay.  DEDRA Mckeon

## 2017-11-15 NOTE — PROGRESS NOTES
Subjective:       Patient ID: Selma Rosado MD Jose J is a 82 y.o. female.    Chief Complaint: Rash    Rash   This is a new problem. The problem has been gradually worsening since onset. The affected locations include the right elbow, right arm, right shoulder and neck. The rash is characterized by blistering and itchiness. She was exposed to nothing. Pertinent negatives include no anorexia, congestion, cough, diarrhea, eye pain, facial edema, fatigue, fever, joint pain, nail changes, rhinorrhea, shortness of breath, sore throat or vomiting. Past treatments include nothing. The treatment provided no relief.     Review of Systems   Constitutional: Negative for activity change, chills, fatigue and fever.   HENT: Negative for congestion, ear pain, nosebleeds, postnasal drip, rhinorrhea, sinus pressure and sore throat.    Eyes: Negative.  Negative for pain and visual disturbance.   Respiratory: Negative for cough, chest tightness, shortness of breath and wheezing.    Cardiovascular: Negative for chest pain.   Gastrointestinal: Negative for abdominal pain, anorexia, diarrhea, nausea and vomiting.   Genitourinary: Negative for difficulty urinating, dysuria, frequency and urgency.   Musculoskeletal: Negative for arthralgias, joint pain and neck stiffness.   Skin: Positive for rash. Negative for nail changes.   Neurological: Negative for dizziness, weakness and headaches.   Psychiatric/Behavioral: Negative for sleep disturbance. The patient is not nervous/anxious.        Objective:      Physical Exam   Skin:            Assessment:       1. Herpes zoster without complication        Plan:   Selma was seen today for rash.    Diagnoses and all orders for this visit:    Herpes zoster without complication    Other orders  -     valACYclovir (VALTREX) 1000 MG tablet; Take 1 tablet (1,000 mg total) by mouth 3 (three) times daily.

## 2017-11-16 ENCOUNTER — TELEPHONE (OUTPATIENT)
Dept: NEUROLOGY | Facility: CLINIC | Age: 82
End: 2017-11-16

## 2017-11-16 ENCOUNTER — DOCUMENTATION ONLY (OUTPATIENT)
Dept: REHABILITATION | Facility: HOSPITAL | Age: 82
End: 2017-11-16

## 2017-11-16 NOTE — TELEPHONE ENCOUNTER
----- Message from Gurmeet Pedro sent at 11/16/2017 11:36 AM CST -----  Contact: Rosy ( daughter ) @ [email or web address]  Caller is requesting a return phone call regarding patient condition, pls email she lives in [place] and not sure if international calls are permitted

## 2017-11-16 NOTE — PROGRESS NOTES
Documentation Only/ No Show      Patient: Selma Lux MD  Date of Session: 11/16/2017  Diagnosis: No diagnosis found.  MRN: [id number]  Selma Alonzo Lux MD did not attend her scheduled therapy appointment today. Selma did not call to cancel nor reschedule. This is the 2nd appointment that she has not attended. Next appointment is scheduled for 11/21/17 will follow up with patient at that time. No charges have been posted today.     Katerine Mchugh, PTA  11/16/2017

## 2017-11-21 ENCOUNTER — DOCUMENTATION ONLY (OUTPATIENT)
Dept: REHABILITATION | Facility: HOSPITAL | Age: 82
End: 2017-11-21

## 2017-11-21 DIAGNOSIS — Z74.09 IMPAIRED FUNCTIONAL MOBILITY, BALANCE, GAIT, AND ENDURANCE: ICD-10-CM

## 2017-11-21 RX ORDER — PREDNISONE 5 MG/1
TABLET ORAL
Qty: 180 TABLET | Refills: 0 | Status: SHIPPED | OUTPATIENT
Start: 2017-11-21 | End: 2018-02-19 | Stop reason: SDUPTHER

## 2017-11-21 NOTE — PROGRESS NOTES
Subjective:      Patient ID: Selma Rosado MD Jose J is a 82 y.o. female.    Chief Complaint: Neck Pain    Dr Lux is a 81 yo female here for evaluation of neck pain.  She has seen Dr. Landa for her low back and had a Right L4/5 TFESI with Dr. Valdez.  She has left sided hemichorea.   The neck pain has been on and off for years.  The pain has been worsening over the past week.  She has trouble turning her head and has a hard time keeping the head up.  The pain in the neck is the middle of the neck.  She recently developed the shingles.  Earlier this week she had sharp pain in the neck.  Now she has deep achy pain.  She has a hard time holding her head up.  The pain is in the front of the neck and the back of the neck.  She has trouble lying down.  She does not remember what is more comfortable.  The pain is severe all the time.  The pain gets worse as the day goes on.  Pain is 10/10.  She does not take nsaid because of the pain.  She takes prednisone everyday.  She still has soreness.  She just got valium, but does not feel like it helps the neck.  He is taking gabapentin 3 times a day and not sure it is helping.  She does not feel like it is helping.  The pain is severe but she is unable to say where, just the entire neck.  The pain does not go down the arms.      MRI cervical 3/2017  There is mild anterolisthesis C2 on C3 and mild retrolisthesis of C3 on C4.  There is also mild retrolisthesis of C6 on C7.  The spondylolisthesis is likely secondary to ligamentous laxity.  There is straightening of normal cervical lordosis. Vertebral body heights are well maintained without evidence of fracture or marrow signal abnormality suspicious for an infiltrative process.  There is loss of intervertebral disc space height at C5-6 and C6-7 Visualized posterior fossa, cervical cord, and upper thoracic cord demonstrate normal signal. Surrounding soft tissue structures demonstrate no significant abnormalities.      C2-3:   Posterior disc osteophyte complex and bilateral facet arthropathy.  Effacement of the anterior thecal sac, without flattening of the spinal cord.  No significant neural foraminal narrowing.    C3-4:  Posterior disc osteophyte complex and bilateral facet arthropathy, resulting in effacement of the anterior thecal sac, without flattening of the spinal cord.  There is moderate left-sided neuroforaminal narrowing.      C4-5:  Posterior disc osteophyte complex and bilateral facet arthropathy, resulting in effacement of the anterior thecal sac, without flattening of the spinal cord.  There is no significant neural foraminal narrowing.    C5-6:  Posterior disc osteophyte complex and bilateral facet arthropathy, resulting in effacement of the anterior thecal sac, without flattening of spinal cord.  No significant neural foraminal narrowing.    C6-7:  Posterior disc osteophyte complex and bilateral facet arthropathy, resulting in effacement of the anterior thecal sac, without flattening of the spinal cord.  No significant neural foraminal narrowing.    C7-T1:  No significant spinal canal stenosis.  No significant neural foraminal narrowing.  Impression          There is multilevel degenerative changes of the cervical spine, resulting in mild spinal canal stenosis and neuroforaminal narrowing as detailed above.        Past Medical History:  No date: Anemia  No date: Arthritis  No date: Hashimoto's disease  No date: Hypertension  1/12/2016: IGT (impaired glucose tolerance)  No date: Joint pain  1/12/2016: Multinodular goiter    Past Surgical History:  No date: CATARACT EXTRACTION  9/29/2015: COLONOSCOPY N/A      Comment: Procedure: COLONOSCOPY;  Surgeon: FIDELINA Sanchez MD;  Location: [address];                 Service: Endoscopy;  Laterality: N/A;  No date: JOINT REPLACEMENT      Comment: right knee  12/31/2013: KNEE SURGERY Left      Comment: TKR  No date: left parotidectomy      Comment: mixed  tumor  No date: SALIVARY GLAND SURGERY  No date: TONSILLECTOMY    Review of patient's family history indicates:    Hypertension                   Mother                    Prostate cancer                Father                      Comment: prostate cancer    Breast cancer                  Maternal Grandmother      Lupus                          Neg Hx                    Psoriasis                      Neg Hx                    Melanoma                       Neg Hx                    Colon cancer                   Neg Hx                      Social History    Marital status:              Spouse name:                       Years of education:                 Number of children:               Social History Main Topics    Smoking status: Never Smoker                                                                Smokeless tobacco: Never Used                        Alcohol use: No                 Comment: very seldom     Drug use: No              Sexual activity: No                      Comment: ,  age 52,         Current Outpatient Prescriptions:  amlodipine (NORVASC) 5 MG tablet, TAKE 1 TABLET DAILY, Disp: 90 tablet, Rfl: 2  deutetrabenazine (AUSTEDO) 9 mg Tab, Take 2 tablets by mouth 2 (two) times daily. Final titrating dose - needs initially titration pack from company, Disp: 120 tablet, Rfl: 11  diazePAM (VALIUM) 2 MG tablet, Take 1 tablet (2 mg total) by mouth 2 (two) times daily., Disp: 60 tablet, Rfl: 0  dicyclomine (BENTYL) 20 mg tablet, Take 1 tablet (20 mg total) by mouth every 6 (six) hours as needed., Disp: 360 tablet, Rfl: 0  ferrous sulfate 325 (65 FE) MG EC tablet, Take 325 mg by mouth once daily. , Disp: , Rfl:   gabapentin (NEURONTIN) 300 MG capsule, Take 1 capsule (300 mg total) by mouth 3 (three) times daily., Disp: 60 capsule, Rfl: 11  hydrocodone-acetaminophen 5-325mg (NORCO) 5-325 mg per tablet, Take 1 tablet by mouth every 24 hours as needed for Pain., Disp: 30 tablet, Rfl:  0  hydroxychloroquine (PLAQUENIL) 200 mg tablet, TAKE 2 TABLETS ONCE DAILY, Disp: 180 tablet, Rfl: 1  linaclotide (LINZESS) 290 mcg Cap, Take 1 capsule (290 mcg total) by mouth once daily., Disp: 30 capsule, Rfl: 0  montelukast (SINGULAIR) 10 mg tablet, TAKE 1 TABLET EVERY EVENING, Disp: 90 tablet, Rfl: 2  predniSONE (DELTASONE) 5 MG tablet, TAKE 2 TABLETS ONCE DAILY (Patient taking differently: TAKE 1 TABLETS ONCE DAILY), Disp: 180 tablet, Rfl: 0  thiamine 100 MG tablet, Take 1 tablet (100 mg total) by mouth once daily., Disp: , Rfl:   valACYclovir (VALTREX) 1000 MG tablet, Take 1 tablet (1,000 mg total) by mouth 3 (three) times daily., Disp: 21 tablet, Rfl: 0    No current facility-administered medications for this visit.       Review of patient's allergies indicates:  No Known Allergies          Review of Systems   Constitution: Negative for weight gain and weight loss.   Cardiovascular: Negative for chest pain.   Respiratory: Negative for shortness of breath.    Musculoskeletal: Positive for joint pain and neck pain. Negative for joint swelling.   Gastrointestinal: Negative for abdominal pain and bowel incontinence.   Genitourinary: Negative for bladder incontinence.   Neurological: Negative for numbness.         Objective:        General: Selma is well-developed, well-nourished, appears stated age, in no acute distress, alert and oriented to time, place and person.     General    Vitals reviewed.  Constitutional: She is oriented to person, place, and time. She appears well-developed and well-nourished.   HENT:   Head: Normocephalic and atraumatic.   Pulmonary/Chest: Effort normal.   Neurological: She is alert and oriented to person, place, and time.   Psychiatric: She has a normal mood and affect. Her behavior is normal. Judgment and thought content normal.     General Musculoskeletal Exam   Gait: abnormal (in wheelchair gait not tested)     Back (L-Spine & T-Spine) / Neck (C-Spine) Exam     Tenderness  Posterior midline palpation reveals tenderness of the Upper C-Spine and Lower C-Spine. Right paramedian tenderness of the Upper C-Spine, Lower C-Spine and Upper T-Spine. Left paramedian tenderness of the Upper C-Spine, Lower C-Spine and Upper T-Spine.     Neck (C-Spine) Range of Motion   Flexion:     30  Extension: 30Right Lateral Bend: 10 Left Lateral Bend: 10 Right Rotation: 30 Left Rotation: 30     Spinal Sensation   Right Side Sensation  C-Spine Level: normal   L-Spine Level: normal  S-Spine Level: normal  Left Side Sensation  C-Spine Level: normal  L-Spine Level: normal  S-Spine Level: normal    Other She has no scoliosis .  Spinal Kyphosis:  Absent    Comments:  With pain in all directions      Muscle Strength   Right Upper Extremity   Biceps: 5/5/5   Deltoid:  5/5  Triceps:  5/5  Wrist Extension: 5/5/5   Finger Flexors:  5/5  Left Upper Extremity  Biceps: 5/5/5   Deltoid:  5/5  Triceps:  5/5  Wrist Extension: 5/5/5   Finger Flexors:  5/5  Right Lower Extremity   Hip Flexion: 5/5   Left Lower Extremity   Hip Flexion: 5/5     Reflexes     Left Side  Biceps:  2+  Triceps:  2+  Brachioradialis:  2+  Quadriceps:  2+  Achilles:  2+  Left Davis's Sign:  Absent  Babinski Sign:  absent    Right Side   Biceps:  2+  Triceps:  2+  Brachioradialis:  2+  Quadriceps:  2+  Achilles:  2+  Right Davis's Sign:  absent  Babinski Sign:  absent    Vascular Exam     Right Pulses        Carotid:                  2+    Left Pulses        Carotid:                  2+              Assessment:       1. Neck pain    2. Muscle spasm    3. Spondylosis of cervical region without myelopathy or radiculopathy           Plan:       Orders Placed This Encounter    X-Ray Cervical Spine AP Lat with Flexion  Extension    Ambulatory Referral to Physical/Occupational Therapy    hydrocodone-acetaminophen 5-325mg (NORCO) 5-325 mg per tablet    baclofen (LIORESAL) 10 MG tablet     More than 50% of the total time of 45 minutes was spent in  counseling on diagnosis and treatment options.  She is currently having neck pain around her neck,  The front and the back with pain with all directions of motion. She has tenderness all over the neck.  She does have cervical DJD, however active shingles.  We discussed the pain likely muscle.  It is very hard to get a good picture of pain.  But seems axial.  She is not complaining of shingles pain in the arm.  1.  X-ray cervical spine  2.  PT for neck ROM, strengthening and retractions and myofascial release at [facility].  She is going to therapy for mobility.  Missed yesterday appointment, thought it was today  3.  Baclofen 10mg po TID  4.  We discussed increasing gabapentin to 600 mg po TID, she wants to try baclofen first  5.  Norco 5/325 number 30 as needed  6.  RTC 4 weeks      Follow-up: Return in about 4 weeks (around 12/20/2017). If there are any questions prior to this, the patient was instructed to contact the office.

## 2017-11-21 NOTE — PROGRESS NOTES
No Show Note/Documenation    Patient: Selma Lux MD  Date of Session: 11/21/2017  Diagnosis:   1. Impaired functional mobility, balance, gait, and endurance       MRN: [id number]    Selma Lux MD did not attend his/her scheduled therapy appointment today. She did not call to cancel nor reschedule. This is the 3rd appointment that she has not attended. No charges have been posted today.

## 2017-11-22 ENCOUNTER — OFFICE VISIT (OUTPATIENT)
Dept: SPINE | Facility: CLINIC | Age: 82
End: 2017-11-22
Attending: PHYSICAL MEDICINE & REHABILITATION
Payer: MEDICARE

## 2017-11-22 ENCOUNTER — HOSPITAL ENCOUNTER (OUTPATIENT)
Dept: RADIOLOGY | Facility: OTHER | Age: 82
Discharge: HOME | End: 2017-11-22
Attending: PHYSICAL MEDICINE & REHABILITATION
Payer: MEDICARE

## 2017-11-22 VITALS
HEART RATE: 92 BPM | BODY MASS INDEX: 25.33 KG/M2 | HEIGHT: 65 IN | DIASTOLIC BLOOD PRESSURE: 72 MMHG | WEIGHT: 152 LBS | SYSTOLIC BLOOD PRESSURE: 149 MMHG

## 2017-11-22 DIAGNOSIS — M62.838 MUSCLE SPASM: ICD-10-CM

## 2017-11-22 DIAGNOSIS — M47.812 SPONDYLOSIS OF CERVICAL REGION WITHOUT MYELOPATHY OR RADICULOPATHY: ICD-10-CM

## 2017-11-22 DIAGNOSIS — M54.2 NECK PAIN: ICD-10-CM

## 2017-11-22 DIAGNOSIS — M54.2 NECK PAIN: Primary | ICD-10-CM

## 2017-11-22 PROCEDURE — 72050 X-RAY EXAM NECK SPINE 4/5VWS: CPT | Mod: 26,,, | Performed by: RADIOLOGY

## 2017-11-22 PROCEDURE — 99204 OFFICE O/P NEW MOD 45 MIN: CPT | Mod: S$PBB,,, | Performed by: PHYSICAL MEDICINE & REHABILITATION

## 2017-11-22 PROCEDURE — 99999 PR PBB SHADOW E&M-EST. PATIENT-LVL V: CPT | Mod: PBBFAC,,, | Performed by: PHYSICAL MEDICINE & REHABILITATION

## 2017-11-22 PROCEDURE — 99215 OFFICE O/P EST HI 40 MIN: CPT | Mod: PBBFAC | Performed by: PHYSICAL MEDICINE & REHABILITATION

## 2017-11-22 PROCEDURE — 72050 X-RAY EXAM NECK SPINE 4/5VWS: CPT | Mod: TC

## 2017-11-22 RX ORDER — BACLOFEN 10 MG/1
10 TABLET ORAL 3 TIMES DAILY
Qty: 90 TABLET | Refills: 2 | Status: SHIPPED | OUTPATIENT
Start: 2017-11-22 | End: 2018-01-04 | Stop reason: SDUPTHER

## 2017-11-22 RX ORDER — HYDROCODONE BITARTRATE AND ACETAMINOPHEN 5; 325 MG/1; MG/1
1 TABLET ORAL
Qty: 30 TABLET | Refills: 0 | Status: SHIPPED | OUTPATIENT
Start: 2017-11-22 | End: 2018-01-04 | Stop reason: SDUPTHER

## 2017-11-22 RX ORDER — HYDROCHLOROTHIAZIDE 12.5 MG/1
TABLET ORAL
COMMUNITY
Start: 2017-11-21 | End: 2017-12-29

## 2017-11-27 ENCOUNTER — OUTPATIENT CASE MANAGEMENT (OUTPATIENT)
Dept: ADMINISTRATIVE | Facility: OTHER | Age: 82
End: 2017-11-27

## 2017-11-27 ENCOUNTER — TELEPHONE (OUTPATIENT)
Dept: SPINE | Facility: CLINIC | Age: 82
End: 2017-11-27

## 2017-11-27 NOTE — PROGRESS NOTES
17  Attempt to complete initial assessment for Outpatient Care Management. A friend who flew in from [place], [place] 2 wks ago answers phone while pt taking bath. She asks for CM contact and would like CM to call back by 11:30 am.     CM called and spoke with Dr. Selma Lux (Family Medicine); Completed initial screen/assessment/Med Rec-no discrepancies/PHQ2=0.  Pt referred to Outpatient Case Management for High Risk Screening.   Pertinent history- Right stroke with left hemichorea, Left Eye retinal vein occlusion, RA,OA nadiya knees w/nadiya TKR, HTN, Hashimoto, Neuropathy, Mild Cognitive Impairment, Impaired functional mobility, balance, gait, and endurance, IBS, Chronic LBP. Currently receiving OP PT and new c/o neck pain.   CM reviewed upcoming med appts- pt reports being aware.   Dr. 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 Her sister, Vania Silveira/1* c/g to pt lives in next door house. Vania's son assists pt much of the time. A friend from [place] has come to assist pt over the last 2 wks, prompted by pt's daughter, living in [facility] who found pt in need of increased assistance in ADLs/IADLs needs. CM can not determine from today's phone conversation with pt when her abilities to care for herself became apparent. Dr. Lux refers to changes significant in the last few weeks to few months. Dr Lux struggled with answering the PHQ2 Depression Screen stating that because of help from friend in last 2 wks--the time span for the declaring s/s depression---her feelings of depression have been minimized at best. She reports originally from 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 She is  x 14 yrs,  spouse an active wartime  for WW[employer], has 2 children - lives outside of country and other in [facility]. Both children are coming in next week to address pt's needs. Dr. Lux would like for LCSW w/OPCM to contact her to  "explore options in pt care short-long-term and for socialization. Dr. Lux returned to [place] to care for her ailing mother. Pt worked 3 yrs at [employer]- finding the adjustment difficult.  She is under the care of NEURO, PSYCH-next appt 2/7/18, PCP, GI, SPINE. She describes left hemichlorea walking with cane on/off x 1 yr. Now she uses the cane to amb at all times. She reports one recent fall, sliding out of bed to floor and issue with her bed being raised considerably from the floor. She is alert/oriented, admits to memory loss, forgetfulness. She searches for words and speaks in circles. She is unable to say for sure whether she can see out of her left eye. Polypharmacy lending to concerns for accuracy in administration/remembering to take/filled meds as ordered, maintaining personal safety/alertness/stability with rxs having sedating effects (gabapentin, hydrocodone-acetaminophen, diazepam). She confirms that she would benefit from a 7 day pill organizer; CM to mail to pt.  Jose J describes bathing in an old claw foot bathtub. Recently she was unable to lift herself out of the tub from a sitting position-- her nephew had to come to her rescue. It is understood that she has some grab bars but could not get out. She reports being in need of rubber/non skid mat in tub. She feels that an assessment of her home for safety modifications would be beneficial. CM explained that while pt is undergoing OP Rehab, that a HH assessment would not be possible. CM to express such concerns to Dr. Thomson ordering pt's current OP PT along with pt's elevated bed. Dr. Lux is interested in Advanced Directives as no one has been appointed to have MPOA. She has hired help coming in daily from recent 2 xwk schedule to aide with her breakfast/ADL/IADLs. Dr. Lux says she does "still sometimes drives". Her sister, Vania or nephew provide her with transportation to med/lab appts/errands. This CM acknowledged the courage pt displays " to share with CM the changes in her abilities that she recognizes that are rendering her no longer independent in living alone on her own.     Referred to LCSW to facilitate in home pt ADL/IADL pt care assistance., VA in home assistance, friends/family assisting with  transportation at present but unclear long-term,nutrition maintained, alternative living options, housing modifications, socialization, coping with physical and mental losses in highly functioning professional individual, facilitate to complete Adv Directives and get on chart.  Message sent to Dr. Thomson regarding home safety concerns--- tub, bed.   Mailed contact for CM/OPCM Brochure, Pill Organizer, Educational material on pt safety, fall prevention, depression. --Addendum: Included copies of Adv Directives- Aracely Quintana, LCSW made aware.     Dr. Thomson/Staff:  Physical Med and Rehab    Dr. Selma Lux was referred to Outpatient Care Management for High Risk Screening and  initial assessment was completed today. I understand she has been attending OP PT sessions for balance, strengthening and safety with mobility.   Two safety concerns--  She reports bathing in a claw foot tub, customarily sitting on the tub bottom and recently had difficulty existing the tub calling upon her nephew to help her out.   Secondly, she has a very high bed that has become challenging to her to get in/out, sliding out of bed to floor recently.   Normally, these may be Home Health OT issues but wondering if ideas can be suggested to her in the outpatient setting. I double checked with [facility] and there is no accommodating transfer tub bench for such tubs (leg extensions were discontinued due to safety concerns). Sounds like some major home modifications are needed to start or even alternative living ie Assisted Living options?  Please advise.   Thank you,  Domonique Baez, JOSEN, RN, CCM [facility] Case [facility]  TEL:  [phone]

## 2017-11-27 NOTE — PATIENT INSTRUCTIONS
What Can Cause Depression?  Depression is a real illness and certain factors have been known to trigger it. Below are some common known causes. Any of these factors, or a combination of them, can make depression more likely. Sometimes, depression occurs for no one clear reason. But no matter what the cause, depression can be treated.    Loss or stress  Depression can occur in children and adults, but it often starts in adulthood. Normal grief over a death, breakup, or other loss may become depression. Life stresses such as physical abuse, job loss, or sudden change in finances can also trigger depression. In some cases, years go by before the depression sets in.  Family history  The tendency to develop depression seems to run in families. If one or more of your close relatives (parents, grandparents, or siblings) have had an episode of depression, you may be more likely to develop the illness, too.  Drugs or alcohol  Drugs and alcohol can upset the chemical balance in the brain. This can lead to an episode of depression. Some depressed people turn to drugs or alcohol to numb the pain. But in the long run, doing so just makes depression worse.  Medicines  Depression can be a side effect of some medicines for high blood pressure, cancer, pain, and other health problems. So tell your doctor about all medicines you take. But never stop taking one without your doctors OK.  Physical illness  Being sick can make anyone feel frustrated and sad. But some health problems may cause actual changes in your brain that lead to depression. Other health problems, such as an underactive thyroid, may be mistaken for depression.  Hormones  Hormones carry messages in the bloodstream. They may affect brain chemicals, leading to depression. Women may get depressed when their hormone levels change quickly, such as just before their period, after giving birth, or during menopause.  Date Last Reviewed: 1/1/2017  © 5010-9346 [employer]. [address] [place] [address], [place] [address]. All rights reserved. This information is not intended as a substitute for professional medical care. Always follow your healthcare professional's instructions.        Depression: Tips to Help Yourself  As your healthcare providers help treat your depression, you can also help yourself. Keep in mind that your illness affects you emotionally, physically, mentally, and socially. So full recovery will take time. Take care of your body and your soul, and be patient with yourself as you get better.    Self-care  · Educate yourself. Read about treatment and medicine options. If you have the energy, attend local conferences or support groups. Keep a list of useful websites and helpful books and use them as needed. This illness is not your fault. Dont blame yourself for your depression.  · Manage early symptoms. If you notice symptoms returning, experience triggers, or identify other factors that may lead to a depressive episode, get help as soon as possible. Ask trusted friends and family to monitor your behavior and let you know if they see anything of concern.  · Work with your provider. Find a provider you can trust. Communicate honestly with that person and share information on your treatment for depression and your reaction to medicines.  · Be prepared for a crisis. Know what to do if you experience a crisis. Keep the phone number of a crisis hotline and know the location of your community's urgent care centers and the closest emergency department.  · Hold off on big decisions. Depression can cloud your judgment. So wait until you feel better before making major life decisions, such as changing jobs, moving, or getting  or .  · Be patient. Recovering from depression is a process. Dont be discouraged if it takes some time to feel better.  · Keep it simple. Depression saps your energy and concentration. So you wont be able to do all the things you  used to do. Set small goals and do what you can.  · Be with others. Dont isolate yourself--youll only feel worse. Try to be with other people. And take part in fun activities when you can. Go to a movie, ballgame, Confucianism service, or social event. Talk openly with people you can trust. And accept help when its offered.  Take care of your body  People with depression often lose the desire to take care of themselves. That only makes their problems worse. During treatment and afterward, make a point to:  · Exercise. Its a great way to take care of your body. And studies have shown that exercise helps fight depression.  · Avoid drugs and alcohol. These may ease the pain in the short term. But theyll only make your problems worse in the long run.  · Get relief from stress. Ask your healthcare provider for relaxation exercises and techniques to help relieve stress.  · Eat right. A balanced and healthy diet helps keep your body healthy.  Date Last Reviewed: 1/1/2017  © 6498-6901 [employer]. [address] [place] [address]. All rights reserved. This information is not intended as a substitute for professional medical care. Always follow your healthcare professional's instructions.        Counseling for Depression  For some people, counseling, also called talk therapy, has been found to be as effective as medicine for mild to moderate depression. When done by a trained professional, this treatment is a powerful way to better understand your thoughts and feelings. Like medicine, it may take time before you notice how much counseling is helping.    Kinds of talk therapy  Different counselors use different methods for talk therapy. But all therapy aims to help change how you think about your problem. Most therapy for depression is often done one-on-one. But it may also be done in a group setting. You and your healthcare provider can discuss the type of therapy you think would work best for you.  You can also discuss who the best person is to provide the therapy.  How therapy helps  Talking about your problems can help them seem less overwhelming. It can help work through problems you have with your life and your relationships. It can also help you understand how depression is clouding your thinking, not letting you see the world the way it really is. Therapy can give you:  · Insight about your emotions  · New tools for dealing with your problems  · Emotional support for making progress  Getting better takes time  Talk therapy can help you feel better. But change doesnt happen right away. Depression takes away your energy and motivation. So it can be hard to feel like going to therapy and sticking with it. But therapy has been proven to be very valuable in the treatment of depression. Therapy for depression is often done for a set number of sessions. In other cases, you and your therapist decide together at what point you no longer need therapy.  Additional sources of help  In addition to a professional counselor, it may help to talk to other people in your life. You may find support and insight from:  · A close friend or family member  · A  trained in counseling  · A local support group or community group  · A 12-step program (such as Alcoholics Anonymous) for dealing with problems that can contribute to depression, such as alcohol or drug addiction  Date Last Reviewed: 1/1/2017 © 2000-2017 [employer]. [address] [place] [address], [place] [address]. All rights reserved. This information is not intended as a substitute for professional medical care. Always follow your healthcare professional's instructions.        Know the Signs and Symptoms of Depression  Everyone feels down at times. The blues are a natural part of life. But an unhappy period thats intense or lasts for more than a couple of weeks can be a sign of depression.  Depression is a serious illness. It is not a sign of  "weakness or a "character flaw," and it is not something you can "snap out of." In fact, most people with depression need treatment to get better. Depression can disrupt the lives of family and friends. If you know someone you think may be depressed, find out what you can do to help.    Recognizing signs of depression  People who are depressed may:  · Feel unhappy, sad, blue, down, or miserable nearly every day  · Feel helpless, hopeless, or worthless  · Lose interest in hobbies, friends, and activities that used to give pleasure  · Not sleep well or sleep too much  · Gain or lose weight  · Feel low on energy or constantly tired  · Have a hard time concentrating or making decisions  · Lose interest in sex  · Have physical symptoms, such as stomachaches, headaches, or backaches  Know the serious signals  Never ignore a person's comments about suicide or behaviors that can lead to self-harm. Warning signals for suicide include:  · Threats or talk of suicide  · Statements such as I wont be a problem much longer or Nothing matters  · Giving away possessions or making a will or  arrangements  · Buying a gun or other weapon  · Sudden, unexplained cheerfulness or calm after a period of depression  If you notice any of these signs, get help right away. Call a healthcare professional, mental health clinic, or suicide hotline and ask what action to take. In an emergency, dont hesitate to call the police.  Resources:  · National Institutes of Mental Ntljfr744-363-0321xlp.[facility].nih.gov  · National Parker on Mental Hwqdhqx999-377-7106rvq.nelson.org   · Mental Health Jrhjged338-375-6642flb.nmha.org  · National Suicide Lpmlmsk881-565-3543 (800-SUICIDE)  · National Suicide Prevention Uwrerreh184-264-4654 (907-042-BDET)www.suicidepreventionlifeline.org   Date Last Reviewed: 2017  © 1024-5351 [employer]. [address] [place] [address], [place] [address]. All rights reserved. This information is not intended as a " substitute for professional medical care. Always follow your healthcare professional's instructions.        Preventing Falls: Making Changes in Your Living Space  Is your living space filled with hazards that could cause you to fall? Changes can make you safer. They could even save your life. Take a careful look around your home. Change what you can on your own. Hire someone or ask friends or family to help with harder tasks.      Be sure to add a nonslip mat to the inside of your shower or bathtub. Always keep a nightlight on. Keep a clear path from your bed to the bathroom. Move items from higher shelves to lower ones.   Remove hazards  · Remove things that can trip you, like throw rugs, boxes, piles of paper, or cords.  · Nail down rugs or carpeting if you don't want to remove them.  · Don't store items on stairs.  · Keep walkways clear.  · Clean up spills right away.  · Replace glass tables with wooden ones. They're safer if you fall.  Add safety devices  · Add handrails to both sides of stairs.  · Buy a raised toilet seat.  · Add grab bars near the toilet and in the shower.  · Get grabbers to help you reach things and avoid climbing.  Improve lighting  · Add nightlights to halls, bedrooms, and bathrooms.   · Put light switches at the top and bottom of stairs.  · Be sure each room and flight of stairs has proper lighting.  · Use shades or curtains to cut glare from windows.  · Put flashlights in each room. Replace burned-out bulbs.  · Get glowing light switches for room entrances.  Take other precautions  · Use nonskid floor wax.  · Buy a nonslip mat and a liquid soap dispenser for the shower.  · Tack down carpets or use slip-resistant backing.  · Put most-used items within easy reach.  · Add bright paint or tape on the top front edge of steps.  · Save big jobs, such as moving furniture or other heavy objects, for family or friends.  · Get professional help installing grab bars. They can be unsafe if not  installed the right way.  Fix riskier rooms first  Don't tackle everything at once. Focus on one room at a time. The bathroom is a common spot for falls, so you may start there. Or start with a room you spend lots of time in, such as your bedroom. Make only a few changes at once. This will give you time to adjust to them.     Outside your home  You might arrange for these changes yourself, or you might need to talk to your  or homeowners' association about them.  · Have loose boards on porches or damaged stairs repaired.  · Have rough edges, holes, or large cracks in sidewalks or driveways repaired.  · Have hazards that could trip you, such as hoses or jett, removed.  · Use high-wattage light bulbs (100 or greater) near outside doors and stairs.  · Get handrails added to outside stairs. Have them extend beyond the bottom step.  · Get help in winter weather with ice or snow removal.   Date Last Reviewed: 6/12/2015  © 3798-1771 [employer]. [address] [place] [address], [place] [address]. All rights reserved. This information is not intended as a substitute for professional medical care. Always follow your healthcare professional's instructions.

## 2017-11-27 NOTE — TELEPHONE ENCOUNTER
----- Message from Jaqui Rosa RN sent at 11/27/2017 11:05 AM CST -----  Contact: Patient  Please schedule patient to see Cinthia on a [place] day please.  ----- Message -----  From: Christy Rodriguez MA  Sent: 11/20/2017   2:48 PM  To: Jaqui Rosa RN        ----- Message -----  From: Eddy Harmon  Sent: 11/20/2017  12:22 PM  To: Derrick Silveira Staff    Patient called in requesting to schedule an appointment with Dr. Meza. Patient stated that she currently has neck pain. Please contact patient to schedule appointment at [phone]. Thank You.

## 2017-11-28 ENCOUNTER — OUTPATIENT CASE MANAGEMENT (OUTPATIENT)
Dept: ADMINISTRATIVE | Facility: OTHER | Age: 82
End: 2017-11-28

## 2017-11-28 ENCOUNTER — TELEPHONE (OUTPATIENT)
Dept: REHABILITATION | Facility: HOSPITAL | Age: 82
End: 2017-11-28

## 2017-11-28 ENCOUNTER — DOCUMENTATION ONLY (OUTPATIENT)
Dept: REHABILITATION | Facility: HOSPITAL | Age: 82
End: 2017-11-28

## 2017-11-28 DIAGNOSIS — Z74.09 IMPAIRED FUNCTIONAL MOBILITY, BALANCE, GAIT, AND ENDURANCE: ICD-10-CM

## 2017-11-28 NOTE — TELEPHONE ENCOUNTER
PT called patient regarding missed follow-up appointments and to determine if pt wanted to schedule any more follow-up appointments with neuro PT regarding her balance and gait deficits. Pt instructed PT that she had a OP PT evaluation scheduled for 12/1/17 for her neck pain. Pt agreeable to attend PT evaluation for her neck and focus on improving neck pain at this time since this is her primary complaint. PT discussed discharging patient from neuro PT at this time so patient can focus on primary complaints of neck pain but with plans to return to neuro PT to further address remaining balance and gait deficits once she completes PT for her neck. Pt agreeable to all of the above.     Additionally, PT instructed pt that pt's  Aracely Quintana had contacted PT this AM regarding setting up an appointment to assess pt's needs after PT appointment on Friday. Pt agreeable to meet with  following PT eval on 12/1/17.

## 2017-11-28 NOTE — PROGRESS NOTES
Kayleen Pierre, [facility] PT, called to say that she had spoken with patient and confirmed her appointment with Laura Michael for a neck assessment this Friday, 11:00 AM, at [facility].  Kayleen said she also scheduled an appointment for patient to visit with this LCSW after she visits with Laura on Friday.  She said patient's appointment with Laura will take 1 hour and she will advise Laura of this LCSWs intented visit with patient.  LCSW will plan on meeting with patient this Friday at 12:00 PM at [facility].

## 2017-11-28 NOTE — PROGRESS NOTES
OUTPATIENT PHYSICAL THERAPY DISCHARGE SUMMARY     Name: Selma Alonzo Lux MD  Clinic Number: [id number]    Diagnosis:   Encounter Diagnosis   Name Primary?    Impaired functional mobility, balance, gait, and endurance      Physician: Branden Rubio MD  Treatment Orders: PT Eval and Treat  Past Medical History:   Diagnosis Date    Anemia     Arthritis     Hashimoto's disease     Hypertension     IGT (impaired glucose tolerance) 1/12/2016    Joint pain     Multinodular goiter 1/12/2016       Initial visit: 10/10/2017  Date of Last visit: 11/14/17  Date of Discharge Note:  11/28/17  Total Visits Received: 5  Missed Visits: 3 no shows, 1 cancellation  ASSESSMENT   Status Towards Goals Met:   Pt has not met any of her goals at this time.     Goals Not achieved and why: Pt has been inconsistent with therapy attendance, partly due to recent episode of shingles. Additionally, pt has experienced an exacerbation of neck pain and is scheduled for OP ortho PT to address these deficits. PT spoke with patient regarding primary complaints at this time. Pt agreeable to address neck pain and then return to neuro PT for balance and gait deficits once neck pain is managed.     GOALS:   Short term goals: 4 weeks, pt agrees to goals set.  7. Pt to report performing HEP daily to increased IND.  8. Pt to demonstrate nadiya hip flexion strength to 4+/5 to improve functional mobility.   9. Pt to demonstrate nadiya hip abduction strength to 4+/5 to improve functional mobility.   10. Pt to score >20/30 on FGA to improve dynamic balance     Long term goals: 8 weeks, pt agrees to goals set  11. Pt to be 100% compliant and IND with HEP.  12. Pt to demonstrate more upright standing/seated head/shoulder posture to improve balance.  13. Pt to improve SSWS to at least 0.86m/sec for improved safety with community ambulation.   14. Pt to demonstrate nadiya hip flexion strength of 5/5 to improve functional mobility.  15. Pt to demonstrate nadiya hip  abduction strength to 5/5 to improve functional mobility.  16. Pt to demonstrate increased step width at least 75% of trial during ambulation to improve Tristan and decrease fall risk.   17. Pt to score >26/30 on FGA to improve dynamic balance  18. Pt to improve FOTO score to at least 60% for improved self concept with functional mobility.          Discharge reason : Pt to work with OP PT for exacerbation of neck pain at this time. Pt to return to neuro PT to further address balance and gait once neck pain symptoms are managed.     PLAN   This patient is discharged from Outpatient Physical Therapy Services.     Kayleen Bañuelos, PT  11/28/2017

## 2017-11-28 NOTE — PROGRESS NOTES
Patient was referred by OPCM RN Domonique Baez for psychosocial support.  In reviewing patient's chart, LCSW noted that patient has an upcoming OP Rehab appointment scheduled for 12/1/17 at the [facility] rehab facility.  LCSW communicated with patient's physical therapist, Kayleen Bañuelos, to inquire about a room where this LCSW can visit with patient to provide resource information.  Ms. Sarmad said she did have a room.  She advised that patient had missed her last few appointments and said she was trying to contact patient regarding her upcoming appointment.   This LCSW attempted to contact patient to complete OPCM SW Assessment and schedule appointment.  Left voice mail message requesting return call.  LCSW will attempt to reach patient again at a later date.

## 2017-11-29 ENCOUNTER — OUTPATIENT CASE MANAGEMENT (OUTPATIENT)
Dept: ADMINISTRATIVE | Facility: OTHER | Age: 82
End: 2017-11-29

## 2017-11-29 NOTE — PROGRESS NOTES
Telelphone encounter with patient to schedule OPCM SW Assessment.  Patient will meet with this LCSW on Friday, 12/1/17, 12:00 PM, at the [facility].  Patient said she did not have time to begin the assessment at the time of the call but said she may call back later.  Care coordinated with OPCM RN Domonique Baez.

## 2017-11-30 ENCOUNTER — TELEPHONE (OUTPATIENT)
Dept: SPINE | Facility: CLINIC | Age: 82
End: 2017-11-30

## 2017-11-30 DIAGNOSIS — M62.838 MUSCLE SPASM: ICD-10-CM

## 2017-11-30 DIAGNOSIS — M54.2 NECK PAIN: Primary | ICD-10-CM

## 2017-11-30 DIAGNOSIS — M47.812 SPONDYLOSIS OF CERVICAL REGION WITHOUT MYELOPATHY OR RADICULOPATHY: ICD-10-CM

## 2017-11-30 DIAGNOSIS — G25.5 HEMICHOREA: ICD-10-CM

## 2017-11-30 NOTE — TELEPHONE ENCOUNTER
----- Message from Domonique Baez RN sent at 11/29/2017  1:30 PM CST -----  Contact: MARILYN Jerez Dr.:    Adding outpatient OT would be good to address pt's safety issues currently found in her home which would likely impress upon pt/family how much assistance pt requires now and in the future to provide for her max safety.  Her two daughters are planning to come into town next week and our SW will be working with Dr. Lux and her daughters to discuss pt's situation and needs.   Thank you,  Domonique     ----- Message -----  From: Dee Tohmson MD  Sent: 11/29/2017  12:21 PM  To: Domonique Baez RN    I can order outpatient OT to address some of these concerns, but with her movement disorder increasing instability, she might need to think of other living options.  Let me know if you want to the OT order to be put in  ----- Message -----  From: Mely Vaz  Sent: 11/29/2017  10:38 AM  To: Dee Thomson MD        ----- Message -----  From: Domonique Baez RN  Sent: 11/27/2017   3:45 PM  To: Alix SIERRA Staff    Dr. Thomson/Staff:    Dr. Selma Lux was referred to Outpatient Care Management for High Risk Screening and  initial assessment was completed today. I understand she has been attending OP PT sessions for balance, strengthening and safety with mobility.   Two safety concerns--  She reports bathing in a claw foot tub, customarily sitting on the tub bottom and recently had difficulty existing the tub calling upon her nephew to help her out.   Secondly, she has a very high bed that has become challenging to her to get in/out, sliding out of bed to floor recently.   Normally, these may be Home Health OT issues but wondering if ideas can be suggested to her in the outpatient setting. I double checked with [facility] and there is no accommodating transfer tub bench for such tubs (leg extensions were discontinued due to safety concerns). Sounds like some major home  modifications are needed to start or even alternative living ie Assisted Living options?  Please advise.   Thank you,  OWEN Jerez, RN, CCM [facility]  TEL:  [phone]

## 2017-12-01 ENCOUNTER — TELEPHONE (OUTPATIENT)
Dept: SPINE | Facility: CLINIC | Age: 82
End: 2017-12-01

## 2017-12-01 ENCOUNTER — CLINICAL SUPPORT (OUTPATIENT)
Dept: REHABILITATION | Facility: HOSPITAL | Age: 82
End: 2017-12-01
Attending: PHYSICAL MEDICINE & REHABILITATION
Payer: MEDICARE

## 2017-12-01 ENCOUNTER — TELEPHONE (OUTPATIENT)
Dept: NEUROLOGY | Facility: CLINIC | Age: 82
End: 2017-12-01

## 2017-12-01 ENCOUNTER — OUTPATIENT CASE MANAGEMENT (OUTPATIENT)
Dept: ADMINISTRATIVE | Facility: OTHER | Age: 82
End: 2017-12-01

## 2017-12-01 PROCEDURE — 97162 PT EVAL MOD COMPLEX 30 MIN: CPT | Mod: PO

## 2017-12-01 PROCEDURE — G8978 MOBILITY CURRENT STATUS: HCPCS | Mod: CL,PO

## 2017-12-01 PROCEDURE — G8979 MOBILITY GOAL STATUS: HCPCS | Mod: CK,PO

## 2017-12-01 NOTE — PROGRESS NOTES
Patient referred by OPCM RN Domonique Baez for psychosocial care coordination. Chart reviewed and [facility] SW Assessment completed in-person with patient and her sister, Vania silveira, at the [facility].  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 She has hx of Stroke with left hemichorea, RA, OA, HTN, Hashimoto, Neuropathy, Mild Cognitive Impairment, Impaired functional mobility, balance, gait, and endurance, and other co-morbid conditions.  She uses a cane to ambulate. Patient is oriented to person, place, and time. She currently receives OP PT, and was assessed today for neck pain at the rehab clinic.  During the SW assessment, patient was able to make meaningful conversation but was forgetful. Of note was that some of her words were scrambled and she had some difficulty verbally bringing her words forward.  She also had twitching in her legs/feet, which she said is part of her disease process and getting progressively worse. And, she said it is becoming increasingly difficult to use her hands.  Patient is aware of her cognitive and physical decline.  She, her sister, and this LCSW discussed the possibility of a neurological evaluation, of which they were in agreement.  Patient would also benefit from an opinion on her ability to care for herself and appropriate level of care for placement.   Patient lives alone in a home she owns.  Ms. Silveira and her son live next door to patient's home. Both assist with caregiving needs in addition to the part-time private pay sitters patient has.  Patient states that her two daughters will be coming in town next week to discuss medical and caregiving needs.  Patient said her  spouse was a [employer] in [place] who retired from the  after serving in WW[place] and [place] [employer].  Patient receives a VA pension and has  for life.  Patient and her sister requested information on caregiving /placement options, as the patient is  becoming more forgetful and requiring more care with ADL/IADL. During the assessment, Dr. Lux was able to make meaningful conversation but was forgetful. Of note was that some of her words were scrambled and she had some difficulty verbally bringing her words forward.  She also had twitching in her legs/feet, which she said is part of her disease process and getting progressively worse. And, she said it is becoming increasingly difficult to use her hands.  DrMolly Lux is aware of her cognitive and physical decline.  She, her sister, and this LCSW discussed the possibility of a neurological evaluation.  She would also benefit from an opinion on her ability to care for herself and appropriate level of care for placement.   LCSW provided discussion/information as noted below.  An Epic in-basket message was sent to Dr. Lee requesting patient follow-up on patient's symptoms and possible neurological referral, and OPCM RN Domonique Baez copied.  LCSW will continue to follow.    Interventions   · Discuss and provide Advance Directive Form and Medical Power of  Form. (5 Wishes mailed)  · Empower patient/caregiver to discuss End of life issues with family, Physician and/or Power of .  · Encourage communication with providers.  · Encourage family/social  and involvement in care.  · Provide education on advance care planning.  · Collaborate with OPCM RN as appropriate to meet patient needs.  · Collaborate with physician as appropriate to meet patient needs  · Discuss patient care needs and potential barriers.  · Encourage communication with providers.  · Encourage family/social  and involvement in care.  · Provide contact information for scheduling appointment.   · Provide information on alternative levels of care.  · Provide information on Adult Day Care  · Provide information on home care services.(provided)  · Provide information on VA benefit(s). (mailed)  · Provide list of  placement facilities. (provided)  · Provide supportive counseling.  · Provide information on Medical Alert System (mailed)    Plan:  Ensure that patient has received and reviewed community resource information.  Review community resource information with patient and provide support as needed.  Encourage communication with providers.  Follow-up on neuro appointment  Follow-up on family/social support involvement in care  Follow-up on completion of Advance Directives (5 Wishes booklet)  Follow-up on caregiving needs

## 2017-12-01 NOTE — TELEPHONE ENCOUNTER
Called and talked to her sister Ms Silveira.  She feels like the cognitive deterioration has been getting worse over the past 2 weeks.  We discussed if think a stroke with new symptoms to go to the hospital and not wait for clinic appointment with neurology.  She understands

## 2017-12-01 NOTE — TELEPHONE ENCOUNTER
----- Message from Michelle Hargrove sent at 12/1/2017 12:14 PM CST -----  Contact: Vania (Sister) [phone]  Vania called to speak to someone regarding scheduling the patient's follow up appointment. Please contact her to discuss further.

## 2017-12-01 NOTE — TELEPHONE ENCOUNTER
----- Message from Laura Michael, PT sent at 12/1/2017  3:24 PM CST -----  Regarding: PT Evaluation   Good Afternoon Dr Thomson,     I performed a modified physical therapy evaluation this morning on your patient, Selma Lux (mrn [id number]). Her sister, Vania, was present and was concerned that she may have had a stroke or TIA due to change in cognitive status. Dr. Lux was not able to give a clear onset or description of her neck pain during the history. Vania stated her neck pain had resolved after taking prescribed medication and is no longer the primary concern. Dr. Lux's prior PT Kayleen Bañuelos, was pulled into the evaluation to determine if her presentation was different from baseline. Kayleen agreed with Vania, stating she has had a change in cognitive function from previous visits. All agreed that it was priority for Dr. Lux to follow up with her neurologist, Dr Giang for further evaluation. I wanted to inform you of the situation and that I will not be treating Molly Jose J for her neck pain at this time. Please feel free to call me with any questions at [phone].     Thanks,   Laura Michael, PT

## 2017-12-01 NOTE — PLAN OF CARE
OUTPATIENT PHYSICAL THERAPY  PHYSICAL THERAPY EVALUATION    Name: Selma Lux MD  Clinic Number: [id number]    Evaluation Date: 12/01/2017  Visit #: 1 / 20   Authorization period Expiration: 12/31/2017  Plan of Care Expiration: 12/31/2017  Precautions: Standard, fall risk     Diagnosis: No diagnosis found.  Physician: Dee Thomson, *  Treatment Orders: PT Eval and Treat  Past Medical History:   Diagnosis Date    Anemia     Arthritis     Hashimoto's disease     Hypertension     IGT (impaired glucose tolerance) 1/12/2016    Joint pain     Multinodular goiter 1/12/2016     Current Outpatient Prescriptions   Medication Sig    amlodipine (NORVASC) 5 MG tablet TAKE 1 TABLET DAILY    baclofen (LIORESAL) 10 MG tablet Take 1 tablet (10 mg total) by mouth 3 (three) times daily.    deutetrabenazine (AUSTEDO) 9 mg Tab Take 2 tablets by mouth 2 (two) times daily. Final titrating dose - needs initially titration pack from company    diazePAM (VALIUM) 2 MG tablet Take 1 tablet (2 mg total) by mouth 2 (two) times daily.    dicyclomine (BENTYL) 20 mg tablet Take 1 tablet (20 mg total) by mouth every 6 (six) hours as needed.    ferrous sulfate 325 (65 FE) MG EC tablet Take 325 mg by mouth once daily.     gabapentin (NEURONTIN) 300 MG capsule Take 1 capsule (300 mg total) by mouth 3 (three) times daily.    hydroCHLOROthiazide (HYDRODIURIL) 12.5 MG Tab     hydrocodone-acetaminophen 5-325mg (NORCO) 5-325 mg per tablet Take 1 tablet by mouth every 24 hours as needed for Pain.    hydroxychloroquine (PLAQUENIL) 200 mg tablet TAKE 2 TABLETS ONCE DAILY    linaclotide (LINZESS) 290 mcg Cap Take 1 capsule (290 mcg total) by mouth once daily.    montelukast (SINGULAIR) 10 mg tablet TAKE 1 TABLET EVERY EVENING    predniSONE (DELTASONE) 5 MG tablet TAKE 2 TABLETS ONCE DAILY    thiamine 100 MG tablet Take 1 tablet (100 mg total) by mouth once daily.    valACYclovir (VALTREX) 1000 MG tablet Take 1 tablet (1,000  "mg total) by mouth 3 (three) times daily.     No current facility-administered medications for this visit.      Review of patient's allergies indicates  No Known Allergies    History   Prior Therapy: Pt received OP neuro PT approximately 3 weeks ago, but discontinued due to onset of neck pain   Social History: Pt currently lives alone   Previous functional status: Prior to incident, pt independent in all ADLs and driving   Current functional status: Pt currently requires assistance for meal prep, house work and bathing   Work: Not working     Subjective   History of Present Illness: Pt presents to [facility] with complaints of neck pain with insidious onset. Pt unsure of onset, but recalls waking up with neck pain one morning. Pt stated she had pain while flexing and extending her neck. Pt's sister, Vania, present during evaluation and assisted in answering interview questions and updating patient's status. Vania stated that the pt's neck pain has subsided with prescribed medication and that no longer her primary compliant. Pt agreed with neck pain relief. Pt's sister reported that she has had a significant change in cognitive function and believes the pt may have suffered another stroke or TIA. Vania is concerned that the pt's functional mobility and cognitive status are deteriorating and that she needs to be placed in a rehab facility. Physical therapist, Kayleen Bañuelos, was brought into the evaluation to determine if pt's presentation had changed from baseline during her previous interaction with Dr. Lux. Kayleen agreed that the pt did experience a change in cognitive function and called Dr. Giang's office to schedule a follow up appointment. Dr. Lux and Vania agreed that she will not be treated in OP PT for her neck pain, and instead follow up with the neurologist to evaluate the patient's status and send a new referral for neuro Pt, if warranted.       DOI: unknown   Imaging, bone scan films: 11/22/17 "2 " "mm anterolisthesis of C2 on C3 which appears to be degenerative in etiology.  This increases to 3 mm on the flexion radiograph of the cervical spine. Cervical spondylosis."  Pain: Pt no longer experiencing cervical pain   Radicular symptoms: None     Objective   Mental status: Pt interactive, but unable to describe current status in regards to neck pain or functional abilities. Pt agreed with her sister's interpretation of her current functional status once verbalized.   Posture/ Alignment: Protruded Head, rounded shoulders    Movement Analysis: Pt with tremor to L hemibody in sitting throughout interview.       GAIT DEVIATIONS: Selma amb with decreased dora, impaired lateral weight shift, and unsteadiness. Pt does not ambulate in straight path despite use of SPC.     No other objective measurements were taken due to pt resolution of symptoms and recent cognitive changes becoming a higher priority for pt and caregiver.       Pt/family was provided educational information, including: follow up with neurologist and receive new orders for neuro PT- pt and caregiver verbalized understanding.    TREATMENT   Time In: 11:17 AM  Time Out: 12:10 PM    Discussed Plan of Care with patient: Yes    Selma received 5 minutes of therapeutic exercises including:   Pt provided with gentle stretches to assist is alleviating cervical pain should it occur again.   UT Stretch 3 x 20 seconds  LS Stretch 3 x 20 seconds       Written Home Exercises Provided: yes   Exercises were reviewed and Selma was able to demonstrate them prior to the end of the session. Pt received a written copy of exercises to perform at home. Selma demonstrated good  understanding of the education provided.     Assessment   Selma is a 82 y.o. female referred to outpatient physical therapy with a medical diagnosis of neck pain. Pt demonstrated recent change in cognitive status and functional mobility warranting follow up with neurologist for further " evaluation. Pt voiced resolution of cervical symptoms and no longer wishes to purse treatment. Pt not a candidate for skilled therapy services at this time for orthopedic treatment. Pt will benefit from follow up with neurologist and referral neuro PT to assist in balance and gait impairments.    History  Co-morbidities and personal factors that may impact the plan of care Examination  Body Structures and Functions, activity limitations and participation restrictions that may impact the plan of care    Clinical Presentation   Co-morbidities:   advanced age        Personal Factors:   memory/cognitive impairments  Body Regions:   neck    Body Systems:   ROM        Participation Restrictions:   Pt currently requires assistance with meal prep, housework and bathing due to other functional limitations unrelated to neck pain.      Activity limitations:   Learning and applying knowledge  no deficits    General Tasks and Commands  no deficits    Communication  no deficits    Mobility  driving (bike, car, motorcycle)    Self care  washing oneself (bathing, drying, washing hands)  looking after one's health    Domestic Life  shopping  cooking  doing house work (cleaning house, washing dishes, laundry)    Interactions/Relationships  no deficits    Life Areas  no deficits    Community and Social Life  no deficits         evolving clinical presentation with changing clinical characteristics                      moderate   moderate  moderate Decision Making/ Complexity Score:  moderate     CMS Impairment/Limitation/Restriction for FOTO Neck Survey    Status   Limitation   Intake   30%   70%   Predicted  51%   49%     G-Code   CMS Severity Modifier  Current Status  CL - At least 60 percent but less than 80 percent  Goal Status+   CK - At least 40 percent but less than 60 percent    Pt's spiritual, cultural and educational needs considered and pt agreeable to plan of care and goals as stated below:       Plan   Follow up with  neurology to determine patient's current neurological status. Pt follow up with , Aracely Quintana, immediately following PT evaluation.     I certify the need for these services furnished under this plan of treatment and while under my care.    Laura Michael, PT

## 2017-12-01 NOTE — TELEPHONE ENCOUNTER
----- Message from Trish Francis sent at 12/1/2017 12:06 PM CST -----  Contact: Caregiver Vania Silveira   Would like someone to call her regarding scheduling an appt as soon as possible for patient stroke.     I was unable to schedule an appt soon for the patient,     Call

## 2017-12-04 ENCOUNTER — TELEPHONE (OUTPATIENT)
Dept: INTERNAL MEDICINE | Facility: CLINIC | Age: 82
End: 2017-12-04

## 2017-12-04 NOTE — TELEPHONE ENCOUNTER
----- Message from Domonique Baez RN sent at 12/4/2017  9:28 AM CST -----  Very nicely stated. Thank you for this update, Astrid Youssef  ----- Message -----  From: Aracely Quintana LCSW  Sent: 12/1/2017   3:25 PM  To: Bhargav Lee MD, Domonique Baez, RN    Dr. Lee,    I met with Dr. Lux and her sister, Mrs. [facility].  Dr. Lux and her sister requested information on alternative caregiving options/placement, as the patient is becoming more forgetful and requiring more care with ADL/IADL.  She currently has part-time, private pay, caregiving services in addition to family support.    During the assessment, Dr. Lux was able to make meaningful conversation but was forgetful.  I also noticed that some of her words were scrambled and she had some difficulty verbally bringing her words forward.  She also had twitching in her legs/feet, which she said is part of her disease process and getting progressively worse. And, she said it is becoming increasingly difficult to use her hands.    Dr. Lux is aware of her cognitive and physical decline.  She, her sister, and I discussed the possibility of a neurological evaluation.  She would also benefit from an opinion on her ability to care for herself and appropriate level of care for placement.      Dr. Lux mentioned that her two daughters will be coming in town next week to assist her with her medical and caregiving needs.  Please follow-up with Dr. Lux to discuss these issues and make referrals/recommendations as you deem appropriate.      Kind regards,    Aracely Quintana

## 2017-12-05 ENCOUNTER — TELEPHONE (OUTPATIENT)
Dept: NEUROLOGY | Facility: HOSPITAL | Age: 82
End: 2017-12-05

## 2017-12-05 NOTE — TELEPHONE ENCOUNTER
Attempted to call pt, but received no answer on either number.  Returned call to pt's sister, Vania, who stated that she felt pt's movements had worsened and to update that she had scheduled an appointment with a stroke doctor (Dr. Tobias).      She stated pt had attended her therapy sessions on Friday, but was concerned by her worsened movements.  States she has continued to take her medicines as prescribed.  Notes that pt's cell phone is not currently working.    I explained that I would try to arrange a f/u appt shortly and have MA call her (Vania), because pt is having phone difficulties at this time.    Branden Rubio MD

## 2017-12-06 ENCOUNTER — OFFICE VISIT (OUTPATIENT)
Dept: NEUROLOGY | Facility: CLINIC | Age: 82
End: 2017-12-06
Payer: MEDICARE

## 2017-12-06 DIAGNOSIS — G25.5 HEMICHOREA: ICD-10-CM

## 2017-12-06 DIAGNOSIS — F39 MOOD DISORDER: ICD-10-CM

## 2017-12-06 DIAGNOSIS — R41.89 COGNITIVE IMPAIRMENT: Primary | ICD-10-CM

## 2017-12-06 DIAGNOSIS — R53.81 DEBILITY: ICD-10-CM

## 2017-12-06 DIAGNOSIS — F01.50 VASCULAR DEMENTIA WITHOUT BEHAVIORAL DISTURBANCE: ICD-10-CM

## 2017-12-06 PROCEDURE — 99214 OFFICE O/P EST MOD 30 MIN: CPT | Mod: S$PBB,GC,, | Performed by: STUDENT IN AN ORGANIZED HEALTH CARE EDUCATION/TRAINING PROGRAM

## 2017-12-06 PROCEDURE — 99999 PR PBB SHADOW E&M-EST. PATIENT-LVL III: CPT | Mod: PBBFAC,GC,, | Performed by: STUDENT IN AN ORGANIZED HEALTH CARE EDUCATION/TRAINING PROGRAM

## 2017-12-06 PROCEDURE — 99213 OFFICE O/P EST LOW 20 MIN: CPT | Mod: PBBFAC | Performed by: STUDENT IN AN ORGANIZED HEALTH CARE EDUCATION/TRAINING PROGRAM

## 2017-12-06 RX ORDER — MEMANTINE HYDROCHLORIDE 28 MG/1
28 CAPSULE, EXTENDED RELEASE ORAL DAILY
Qty: 30 CAPSULE | Refills: 3 | Status: SHIPPED | OUTPATIENT
Start: 2017-12-06 | End: 2018-03-14

## 2017-12-06 NOTE — ASSESSMENT & PLAN NOTE
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     PMH of HTN, Hashimoto's as above, is currently stable.  For her IBS I have recommended she f/u with her GI doctor to discuss linzess.     Recommendations & Plan:  -Continue valium 2 mg BID  -Namenda as under cognitive impairment section  -DO NOT take austedo  -F/u with psychology and psychiatry as below  -Repeat referral to PT and ST (cognition)  -Will work with clinic to arrange for inpt rehab placement (latest update was pending insurance)

## 2017-12-06 NOTE — PATIENT INSTRUCTIONS
-Continue valium 2 mg twice per day  -START namenda titration pack, THEN take 28 mg capsule per day  -DO NOT take austedo  -F/u with psychology (Dr. Rojas) and psychiatry (appt scheduled 2/7/2018)  -Referral to physical and speech therapy  -Will work with clinic to arrange for inpt rehab placement (latest update was pending insurance)    -Follow up with Dr. Morton to discuss IBS and Linzess.

## 2017-12-06 NOTE — PROGRESS NOTES
"Neurology Clinic  Follow-up Visit     Patient Name: Selma Lux MD  MRN: [id number]     CC: Hemichorea     HPI: Selma Lux MD is a 82 y.o. female w/ PMH significant for HTN, Hashimoto's, presenting as follow up for hemichorea.       I last saw Dr. 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  She presents today after missing multiple therapy appointments (PT and psychology/Dr. Corea) and concerns that her hemichorea has worsened.  When pressed, she thinks that her hemichorea may not have worsened so much as she is simply more aware of it.  She is unsure if the valium prescribed has helped, stating that in the past few weeks she has only taken it 1-2x/week.  She notes that she used to take valium "years ago" to help with anxiety, and at that time it seemed to help her anxiety more than it does now.  We discussed that the valium is not for anxiety/mood, but rather for her chorea.       She reports no recent infections.  She reports that she recently ran out of Linzess, for which I recommended that she f/u with her GI doctor to discuss the trial of linzess and determine if she should continue.  She reports that her mood is OK, denies SIHI.      Her family is with her today, and they are concerned that she is falling through a gap in that she is missing therapy appointments and having trouble consistently taking her medications.  Her family believes that she has had a noticeable and abrupt decrease in cognition over a period of ~2 weeks.    Prior histories  Dr. 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  At her last visit with Dr. Giang, she was started on valium 2 mg BID, which she states she has taken very inconsistently, if at all.  She reports no significant improvement " in chorea to date.  Prior authorization for tetrabenazine was attempted, but unsuccessful.  As a result, we attempted to have her started on austedo.  Her austedo was delivered for her to begin taking, but the next day upon reviewing the medication, she called the clinic in significant distress that she was feeling overwhelmed and that it was too much for her.  She was asking for inpt rehab/nursing home placement at that time.  I was concerned that this may be indicative of a mood disorder, and that she may potentially be dangerous to herself (notably she denied any ideation/plan for SIHI at that time).  I advised her to call 911 and present to the ED.  On follow up call later that day, her mood was significantly improved and she declined to come to the ED.  I had another extended phone call with both the pt and her daughter, where I recommended that she NOT take austedo for concerns of her mood disorder, recommended she f/u with psychology and referral to psychiatry, and return to clinic for further management.     She presents today in significantly better spirits than she was while on the phone call described above.  She was recently seen by GI and then in the ED in the 2 days preceding her clinic visit today for abdominal pain (discharged home, advised to continue taking Linzess).       With regards to her hemichorea, her symptoms continue to persist, stable from prior.  She reports that she last took valium 1-2 weeks ago.  She initially states that she couldn't tell if it made a difference, however upon further questioning she states that it may have helped, and she would like to continue for now.     She also reports she has been participating in physical therapy.  She has f/u with outpt psychiatrist, Dr. Corea, with whom she has had two visits. Dr. Lux reports she is pleased with Dr. Corea, and believes these sessions are helpful.     Has psychiatrist appt for 2/2018.       She reports her mood is  ""quite good," denies any SIHI at this time.    Review of Systems:  Constitutional: no fever or chills  Eyes: no visual changes  ENT: no nasal congestion or sore throat  Respiratory: no cough or shortness of breath  Cardiovascular: no chest pain or palpitations  Gastrointestinal: no nausea or vomiting, no abdominal pain or change in bowel habits, positive for constipation  Genitourinary: no hematuria or dysuria  Integument/Breast: no rash or pruritis  Neurological: positive for hemichorea  Behavioral/Psych: no auditory or visual hallucinations    Past Medical History  Past Medical History:   Diagnosis Date    Anemia     Arthritis     Hashimoto's disease     Hypertension     IGT (impaired glucose tolerance) 1/12/2016    Joint pain     Multinodular goiter 1/12/2016       Medications    Current Outpatient Prescriptions:     amlodipine (NORVASC) 5 MG tablet, TAKE 1 TABLET DAILY, Disp: 90 tablet, Rfl: 2    baclofen (LIORESAL) 10 MG tablet, Take 1 tablet (10 mg total) by mouth 3 (three) times daily., Disp: 90 tablet, Rfl: 2    deutetrabenazine (AUSTEDO) 9 mg Tab, Take 2 tablets by mouth 2 (two) times daily. Final titrating dose - needs initially titration pack from company, Disp: 120 tablet, Rfl: 11    diazePAM (VALIUM) 2 MG tablet, Take 1 tablet (2 mg total) by mouth 2 (two) times daily., Disp: 60 tablet, Rfl: 0    dicyclomine (BENTYL) 20 mg tablet, Take 1 tablet (20 mg total) by mouth every 6 (six) hours as needed., Disp: 360 tablet, Rfl: 0    ferrous sulfate 325 (65 FE) MG EC tablet, Take 325 mg by mouth once daily. , Disp: , Rfl:     gabapentin (NEURONTIN) 300 MG capsule, Take 1 capsule (300 mg total) by mouth 3 (three) times daily., Disp: 60 capsule, Rfl: 11    hydroCHLOROthiazide (HYDRODIURIL) 12.5 MG Tab, , Disp: , Rfl:     hydrocodone-acetaminophen 5-325mg (NORCO) 5-325 mg per tablet, Take 1 tablet by mouth every 24 hours as needed for Pain., Disp: 30 tablet, Rfl: 0    hydroxychloroquine " (PLAQUENIL) 200 mg tablet, TAKE 2 TABLETS ONCE DAILY, Disp: 180 tablet, Rfl: 1    linaclotide (LINZESS) 290 mcg Cap, Take 1 capsule (290 mcg total) by mouth once daily., Disp: 30 capsule, Rfl: 0    memantine (NAMENDA XR) 7-14-21-28 mg C24k, Follow titration instructions 7 mg x1. 14 mg x1 week. 21 mg x1 week. 28 mg x1 week., Disp: 1 each, Rfl: 0    memantine 28 mg CSpX, Take 1 capsule (28 mg total) by mouth once daily. START after titration pack completed., Disp: 30 capsule, Rfl: 3    montelukast (SINGULAIR) 10 mg tablet, TAKE 1 TABLET EVERY EVENING, Disp: 90 tablet, Rfl: 2    predniSONE (DELTASONE) 5 MG tablet, TAKE 2 TABLETS ONCE DAILY, Disp: 180 tablet, Rfl: 0    thiamine 100 MG tablet, Take 1 tablet (100 mg total) by mouth once daily., Disp: , Rfl:     valACYclovir (VALTREX) 1000 MG tablet, Take 1 tablet (1,000 mg total) by mouth 3 (three) times daily., Disp: 21 tablet, Rfl: 0  Any other notable medications as documented in HPI    Allergies  Review of patient's allergies indicates:  No Known Allergies    Social History  Social History     Social History    Marital status:      Spouse name: N/A    Number of children: N/A    Years of education: N/A     Occupational History    Not on file.     Social History Main Topics    Smoking status: Never Smoker    Smokeless tobacco: Never Used    Alcohol use No      Comment: very seldom     Drug use: No    Sexual activity: No      Comment: ,  age 52,      Other Topics Concern    Not on file     Social History Narrative    No narrative on file     Any other notable Social History as documented in HPI.    Family History  Family History   Problem Relation Age of Onset    Hypertension Mother     Prostate cancer Father      prostate cancer    Breast cancer Maternal Grandmother     Lupus Neg Hx     Psoriasis Neg Hx     Melanoma Neg Hx     Colon cancer Neg Hx      Any other notable FMH as documented in HPI.    Physical Exam  There were no  vitals taken for this visit.    General: Well-developed, well-groomed. No apparent distress  Cardiovascular: Regular rate and rhythm with no murmurs, rubs or gallops.  Chest: Lungs clear to auscultation bilaterally.  No wheezes, stridor, ronchi appreciated.  Abdomen: Normoactive bowel sounds present.  Soft, nontender to palpation.  Extremities: No peripheral edema     Neurologic Exam: The patient is awake, alert and oriented. Language is fluent.  Fund of knowledge is appropriate.      Cranial nerves:   Pupils are round and reactive to light and accommodation.  Visual fields are full to confrontation.    Ocular motility is full in all cardinal positions of gaze.   Facial sensation is normal to light touch.   Facial activation is symmetric.   Hearing is symmetric bilaterally.   Palate elevates symmetrically.   Shoulder elevation is symmetric and full strength bilaterally.   Tongue is midline and neck rotation strength is normal bilaterally.      Motor examination L-sided hemichorea, occasional blepharospasm of L eye.  Strength is 4+/5 in the upper and lower extremities bilaterally.      Sensory examination is normal light touch in BUE and BLE.     Deep tendon reflexes are 2+ and symmetric in the upper and lower extremities bilaterally.  No clonus, equivocal toes b/l.     Gait: Unstable casual gait, ambulates with cane.     Coordination: Finger to nose intact b/l.  Chorea prominent on LLE with heel/shin.      12-6-17 MOCA Score: 15          Lab and Test Results    WBC   Date Value Ref Range Status   12/01/2017 8.97 3.90 - 12.70 K/uL Final   11/07/2017 7.72 3.90 - 12.70 K/uL Final   09/07/2017 11.02 3.90 - 12.70 K/uL Final     Hemoglobin   Date Value Ref Range Status   12/01/2017 11.5 (L) 12.0 - 16.0 g/dL Final   11/07/2017 11.4 (L) 12.0 - 16.0 g/dL Final   09/07/2017 12.6 12.0 - 16.0 g/dL Final     Hematocrit   Date Value Ref Range Status   12/01/2017 37.2 37.0 - 48.5 % Final   11/07/2017 36.3 (L) 37.0 - 48.5 % Final    09/07/2017 40.5 37.0 - 48.5 % Final     Platelets   Date Value Ref Range Status   12/01/2017 225 150 - 350 K/uL Final   11/07/2017 253 150 - 350 K/uL Final   09/07/2017 278 150 - 350 K/uL Final     Glucose   Date Value Ref Range Status   12/01/2017 109 70 - 110 mg/dL Final   11/07/2017 99 70 - 110 mg/dL Final   09/07/2017 106 70 - 110 mg/dL Final     Sodium   Date Value Ref Range Status   12/01/2017 141 136 - 145 mmol/L Final   11/07/2017 140 136 - 145 mmol/L Final   09/07/2017 142 136 - 145 mmol/L Final     Potassium   Date Value Ref Range Status   12/01/2017 3.5 3.5 - 5.1 mmol/L Final   11/07/2017 3.5 3.5 - 5.1 mmol/L Final   09/07/2017 3.6 3.5 - 5.1 mmol/L Final     Chloride   Date Value Ref Range Status   12/01/2017 103 95 - 110 mmol/L Final   11/07/2017 104 95 - 110 mmol/L Final   09/07/2017 105 95 - 110 mmol/L Final     CO2   Date Value Ref Range Status   12/01/2017 28 23 - 29 mmol/L Final   11/07/2017 30 (H) 23 - 29 mmol/L Final   09/07/2017 25 23 - 29 mmol/L Final     BUN, Bld   Date Value Ref Range Status   12/01/2017 10 8 - 23 mg/dL Final   11/07/2017 8 8 - 23 mg/dL Final   09/07/2017 12 8 - 23 mg/dL Final     Creatinine   Date Value Ref Range Status   12/01/2017 0.9 0.5 - 1.4 mg/dL Final   11/07/2017 0.7 0.5 - 1.4 mg/dL Final   09/07/2017 0.9 0.5 - 1.4 mg/dL Final     Calcium   Date Value Ref Range Status   12/01/2017 9.5 8.7 - 10.5 mg/dL Final   11/07/2017 9.2 8.7 - 10.5 mg/dL Final   09/07/2017 9.7 8.7 - 10.5 mg/dL Final     Magnesium   Date Value Ref Range Status   12/01/2017 1.9 1.6 - 2.6 mg/dL Final   02/04/2015 1.9 1.6 - 2.6 mg/dL Final   05/02/2011 2.1 1.6 - 2.6 mg/dl Final     Phosphorus   Date Value Ref Range Status   05/02/2011 3.1 2.7 - 4.5 mg/dl Final     Alkaline Phosphatase   Date Value Ref Range Status   12/01/2017 63 55 - 135 U/L Final   11/07/2017 63 55 - 135 U/L Final   09/07/2017 66 55 - 135 U/L Final     ALT   Date Value Ref Range Status   12/01/2017 12 10 - 44 U/L Final   11/07/2017  10 10 - 44 U/L Final   09/07/2017 9 (L) 10 - 44 U/L Final     AST   Date Value Ref Range Status   12/01/2017 14 10 - 40 U/L Final   11/07/2017 11 10 - 40 U/L Final   09/07/2017 15 10 - 40 U/L Final         Images:  No new perinent imaging    Assessment and Plan    Problem List Items Addressed This Visit        Neuro    Hemichorea    Current Assessment & Plan     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     PMH of HTN, Hashimoto's as above, is currently stable.  For her IBS I have recommended she f/u with her GI doctor to discuss linzess.     Recommendations & Plan:  -Continue valium 2 mg BID  -Namenda as under cognitive impairment section  -DO NOT take austedo  -F/u with psychology and psychiatry as below  -Repeat referral to PT and ST (cognition)  -Will work with clinic to arrange for inpt rehab placement (latest update was pending insurance)         Relevant Orders    Ambulatory Referral to Speech Therapy    Ambulatory Referral to Physical/Occupational Therapy    Cognitive impairment - Primary    Overview     Likely component of vascular dementia         Current Assessment & Plan     Likely related to vascular dementia.    -8/23/17 MOCA score: 21  -12/6/17 MOCA score: 15    Plan  -Namenda titration pack  -Namenda 28 mg extended release Qdaily         Relevant Medications    memantine (NAMENDA XR) 7-14-21-28 mg C24k    memantine 28 mg CSpX    Other Relevant Orders    Ambulatory Referral to Speech Therapy    Ambulatory Referral to Physical/Occupational Therapy    Vascular dementia    Current Assessment & Plan     Likely contributing to cognitive impairment            Psychiatric    Mood disorder    Current Assessment & Plan     Likely contributing to deconditioning and difficulty in managing herself at home alone.     Pt has missed recent appts with Dr. Corea, discussed  importance of rescheduling and consistently attending.    -AVOID austedo for now (dopamine-depleting)  --> f/u with outpt psychologist Dr. Corea  -F/u with psychiatry (appt scheduled 2/7/2018)              Other    Debility    Current Assessment & Plan     Likely combination of deconditioning, hemichorea (management as above)  -Continue PT (will resend referral as she was discharged after missed appointments)  -Requires a cane at baseline  -MA to continue work on inpt rehab placement   -Pt is eager for assistance and would prefer inpt rehab assistance.         Relevant Orders    Ambulatory Referral to Speech Therapy    Ambulatory Referral to Physical/Occupational Therapy            Branden Rubio MD  Neurology Resident   [facility]  [address] [place] [address]

## 2017-12-06 NOTE — ASSESSMENT & PLAN NOTE
Likely combination of deconditioning, hemichorea (management as above)  -Continue PT (will resend referral as she was discharged after missed appointments)  -Requires a cane at baseline  -MA to continue work on inpt rehab placement   -Pt is eager for assistance and would prefer inpt rehab assistance.

## 2017-12-06 NOTE — ASSESSMENT & PLAN NOTE
Likely contributing to deconditioning and difficulty in managing herself at home alone.     Pt has missed recent appts with Dr. Corea, discussed importance of rescheduling and consistently attending.    -AVOID austedo for now (dopamine-depleting)  --> f/u with outpt psychologist Dr. Corea  -F/u with psychiatry (appt scheduled 2/7/2018)

## 2017-12-07 ENCOUNTER — OUTPATIENT CASE MANAGEMENT (OUTPATIENT)
Dept: ADMINISTRATIVE | Facility: OTHER | Age: 82
End: 2017-12-07

## 2017-12-07 NOTE — PROGRESS NOTES
Chart reviewed.  Telephonic follow-up with patient's daughter, DrMolly Madhavi Abebe ([phone]), who was at patient's home.  She said patient was sleeping at the time of the call. She said she is in town from [place] and will be staying with patient for approximately one more week.  She said patient's other daughter will also be coming in town and she will be staying until January 4th.  Dr. Lomas said the family will be meeting to discuss patient's caregiving needs and options. She said one possibility is that patient would return to [place] with her for a short period of time so she can can a clearer picture of patient's medical condition/needs.  Dr. Lomas said she visited the neurologist yesterday with patient and received an update on patient's status at that time.  LCSW reviewed the last visit she made with patient and patient's sister, Ms. Silveira.  The community resource information provided at that visit was reviewed.  Dr. Lomas said she will look for the packet of information mailed to patient's home and review the information with her sister.  She said patient has an appointment on Monday to see an  about MPOA.  She was informed that one has to be mentally competent to assign a MPOA, which she was encouraged to discuss with her .   Dr. Lomas said she would like to meet telephonically with this LCSW after reviewing the resources and meeting with patient/family.  A follow-up visit was scheduled for Tuesday, 12/12/17, at 1:00 PM.  LCSW will call Dr. Lomas on her cell phone as noted above.    Interventions   · Review resources provided at last meeting to patient and Ms. Silveira  · Discuss barriers to patient care  · Encourage family/social support network involvement in care  · Discuss options for caregiving    Plan:  Ensure that patient/family has received and reviewed community resource information.  Review community resource  information with patient and provide support as needed.  Encourage communication with providers.  Follow-up on Advance Directives; MPOA  Follow-up on caregiving needs and plan

## 2017-12-07 NOTE — ASSESSMENT & PLAN NOTE
Likely related to vascular dementia.    -8/23/17 MOCA score: 21  -12/6/17 MOCA score: 15    Plan  -Namenda titration pack  -Namenda 28 mg extended release Qdaily

## 2017-12-11 ENCOUNTER — TELEPHONE (OUTPATIENT)
Dept: INTERNAL MEDICINE | Facility: CLINIC | Age: 82
End: 2017-12-11

## 2017-12-11 ENCOUNTER — OUTPATIENT CASE MANAGEMENT (OUTPATIENT)
Dept: ADMINISTRATIVE | Facility: OTHER | Age: 82
End: 2017-12-11

## 2017-12-11 DIAGNOSIS — I63.9 CEREBROVASCULAR ACCIDENT (CVA), UNSPECIFIED MECHANISM: ICD-10-CM

## 2017-12-11 DIAGNOSIS — G31.84 MILD COGNITIVE IMPAIRMENT: ICD-10-CM

## 2017-12-11 DIAGNOSIS — G25.5 HEMICHOREA: Primary | ICD-10-CM

## 2017-12-11 NOTE — PROGRESS NOTES
12/11/17  CM f/u to update plan of care. CM collaborated with Aracely Quintana LCSW and then with Kayleen Bañuelos, PT OP to discuss pt situation and needs.   Message sent to PCP at this juncture.   NATALIA then called and spoke with Dr. Lux's daughter, Rosy Rosado in town from [place]. She reports on having increased help for pt. Someone comes in every morning till noon to help with breakfast/lunch, personal care,meds every day except Sun. Pt's sister, Vania lives next door, family around. Rosy is working with Dr. Giang, Neuro to see about Inpt Rehab options given pt's h/o recent stroke. She reports MD is checking with pt's insurance--hoping for more intensive stroke/rehab.  Rosy is giving a wk and a half to see how her mother responds to the OP PT/ST. She definitely sees a decline in pt's abilities. Rosy asks about option of HH should Inpt Rehab not be approved? NATALIA explained that certainly HH PT/OT/ST/SN is available- explained HH intermittent visits and short-term. She expresses her understanding. She will remain with her mother through mid Jan 2018. She is arranging for friends to assist in being with pt until pt is more stable and other plans possibly initiated such as moving pt to [place] to live close to other daughter. NATALIA encouraged pt safety in her current home-- the assistant is helping pt to enter/exit the old fashion tub and Rosy is easing pt into considering removing her high bed for a lower one. Rosy is well aware of pt's needs to ensure her safety and to provide all options to restore pt's mobility, gait & balance. OP ST & Neuro Stroke eval scheduled next week.     Plan for next encounter  F/u with daughter, Rosy in one wk 12/18 on pt process and pt care plans.    Addendum: Dgt's name was corrected-- from Tala to Rosy.           Dr. Lee/Staff:  Aracely Quintana LCSW and I are working together to assist Dr. Lux/family.     I just spoke with Kayleen Bañuelos PT at  [facility] who is familiar with Dr. Lux and will be seeing her tomorrow to resume OP Neuro PT. I expressed concerns over the apparent idea to seek Inpt Rehab for Dr. Lux by family. Kayleen and I agree that even should Dr. Lux be accepted to Inpt Rehab program which would be yet to be determined--once discharged from the program after a  2-3 wk rehab stay, there would still be  a need for 24/7 assistance in order for Dr. Lux to safely live and meet her ADL/IADLs ie med compliance, bathing, eating due to cognitive deficits, transportation.   It is hoped that Dr. Lee or perhaps Dr. Giang, Neuro can collaborate with PT on the best options for pt.     Please advise.     Thank you,  Domonique Baez, JOSEN, RN, CCM [facility]  TEL:  [phone]

## 2017-12-11 NOTE — TELEPHONE ENCOUNTER
----- Message from Domonique Baez RN sent at 12/11/2017  3:11 PM CST -----  Contact: MARILYN Jerez Dr./Staff:  Aracely Quintana LCSW and I are working together to assist Dr. Lux/family.     I just spoke with Kayleen Bañuelos, PT at [facility] who is familiar with Dr. Lux and will be seeing her tomorrow to resume OP Neuro PT. I expressed concerns over the apparent idea to seek Inpt Rehab for Dr. Lux by family. Kayleen and I agree that even should Dr. Lux be accepted to Inpt Rehab program which would be yet to be determined--once discharged from the program after a  2-3 wk rehab stay, there would still be  a need for 24/7 assistance in order for Dr. Lux to safely live and meet her ADL/IADLs ie med compliance, bathing, eating due to cognitive deficits, transportation.   It is hoped that Dr. Lee or perhaps Dr. Giang, Neuro can collaborate with PT on the best options for pt.     Please advise.     Thank you,  Domonique Baez, OWEN, RN, CCM [facility]  TEL:  [phone]

## 2017-12-12 ENCOUNTER — CLINICAL SUPPORT (OUTPATIENT)
Dept: REHABILITATION | Facility: HOSPITAL | Age: 82
End: 2017-12-12
Attending: PHYSICAL MEDICINE & REHABILITATION
Payer: MEDICARE

## 2017-12-12 ENCOUNTER — OUTPATIENT CASE MANAGEMENT (OUTPATIENT)
Dept: ADMINISTRATIVE | Facility: OTHER | Age: 82
End: 2017-12-12

## 2017-12-12 ENCOUNTER — TELEPHONE (OUTPATIENT)
Dept: INTERNAL MEDICINE | Facility: CLINIC | Age: 82
End: 2017-12-12

## 2017-12-12 DIAGNOSIS — Z74.09 IMPAIRED FUNCTIONAL MOBILITY, BALANCE, GAIT, AND ENDURANCE: ICD-10-CM

## 2017-12-12 PROBLEM — I63.9 CEREBROVASCULAR ACCIDENT (CVA): Status: ACTIVE | Noted: 2017-12-12

## 2017-12-12 PROCEDURE — G8978 MOBILITY CURRENT STATUS: HCPCS | Mod: CL,PO

## 2017-12-12 PROCEDURE — 97162 PT EVAL MOD COMPLEX 30 MIN: CPT | Mod: PO

## 2017-12-12 PROCEDURE — G8979 MOBILITY GOAL STATUS: HCPCS | Mod: CJ,PO

## 2017-12-12 NOTE — PROGRESS NOTES
Patient's daughter, Dr. Lomas, called to cancel today's 1:00 pm telephonic visit with patient/family.  She said she is ill and patient has an assessment scheduled with OP Rehab.  She said she is not sure patient will up to visiting with this LCSW after her rehab appointment.  She said she will be leaving to go back to [place] on Thursday afternoon.  LCSW suggested that we leave the appointment standing and, if she and patient were up to the visit, she could call this LCSW at the appointed time.  She was agreeable to that.  Another telephonic appointment was scheduled for Thursday morning at 10:30 AM.  Inpatient rehabilitation placement was discussed with Dr. Lomas and she understands that there is specific criteria patient would have to meet to be eligible for placement.  She is aware that the assessment today at OP Rehab will assist with determining if patient would be a good candidate.  The process for placement was discussed and she is aware that Dr. Lee will have to make the referral and complete the paperwork, and that it is up to patient/family to decide on where patient would want to be placed.  Dr. Lomas was directed to the Senior Resource Guide for a listing of faciclities.   [facility] and [facility] is [facility] that [facility] would [facility] for [facility] if needed.  She said is has not yet been decided where patient would go if she is eligible for placement.  Care coordinated with OPCM RN Domonique Baez who has spoken with patient's other daughter and she has also been collaborating with Dr. Lee on possible IP Rehab placement. Dr. Lomas was able to locate the packet of information mailed to patient in the home.  LCSW will continue to follow.    Interventions : Per POC    Plan:  Review community resource information with patient and provide support as needed.  Encourage communication with providers.  Follow-up on completion of  Advance Directives (5 Wishes booklet)  Follow-up on caregiving needs  Provide support placement as needed

## 2017-12-12 NOTE — PLAN OF CARE
OUTPATIENT NEUROLOGICAL REHABILITATION  PHYSICAL THERAPY RE-EVALUATION     Name: Selma Rosado MD Jose J  Clinic Number: [id number]     Diagnosis:        Encounter Diagnoses   Name Primary?    Impaired functional mobility, balance, gait, and endurance      Decreased strength of lower extremity        Physician: Branden Rubio MD  Treatment Orders: PT Eval and Treat       Past Medical History:   Diagnosis Date    Anemia      Arthritis      Hashimoto's disease      Hypertension      IGT (impaired glucose tolerance) 1/12/2016    Joint pain      Multinodular goiter 1/12/2016         Re-Evaluation Date: 12/12/17  Visit #: [id number]  Plan of care expiration: 12/5/2017  Extended POC Expiration: 02/06/17  Precautions: universal, visual impairment, impaired memory     History   Pt's daughter Rosy present for re-evaluation.     Medical Diagnosis: hemichorea, CVA (R caudate lacunar infarction)  PT Diagnosis: Decreased B LE strength, decreased ambulation, impaired balance  PMH significant for: low back pain, OA of multiple joints, visual deficits, cognitive impairment, B rotator cuff repair, neuropathy, vascular dementia     History of Present Illness: Selma is a 82 y.o. female that presents to [facility] with reported uncontrolled LUE/LLE movement, gait deviations, impaired dynamic balance, decreased LE strength, and fear of falling with functional mobility. Pt was initially evaluated on 10/10/17 for L sided hemichorea associated with a medical diagnosis of CVA which contributed to impaired balance, impaired ambulation, and strength deficits. Therapy was interrupted due to patient forgetting her appointments and pt's c/o significant neck pain. Pt was evaluated by ortho PT for neck pain on 12/01/17, but has since decided that her balance and ambulation deficits are most problematic at this time and would like to focus on neuro rehab. Recently over the past ~3-4 months, pt's family has  "noticed a decline in patient's memory, functional mobility/ADL, her ability to care for herself, and her ability to perform IADLs. Pt continues to reports that the uncontrolled movement throughout her L arm and L leg come and go throughout the day and can not recall and specific triggers. Both patient and her daughter endorse that patient appears more unsteady with ambulation.         Chief complaints:  1. Uncontrolled movement in LLE and LUE  2. Balance deficits  3. L side neck pain  4. Decreased functional mobility/ADL and ambulation at home  5. R knee pain  6. Decreased endurance  7. Fear of falling     Falls in the past year: none  Prior Therapy: Related to knee replacement, rotator cuff repair; OP neuro PT for balance  Nutrition:  overweight  Social History/Support systems: aide helps, ~ 4 hours a day, 6 days a week; sister Vania checks in with patient daily (lives next door); pt's daughters are currently in town to set-up appropriate care  Place of Residence (Steps/Adaptations/Levels): 5 steps to enter house; lives alone, assistance provided during the morning by aide  Previous functional status includes: IND with driving, dressing  Current functional status:  (A) to get into/out of tub, (A) to get into/out of bed, S  for ambulation with SPC, (A) with dressing, (A) with bathing; not driving any more  Exercise routine prior to onset : Sedentary  DME owned:SPC, RW, claw-foot tub  Work/Job description includes:  Not working        Subjective   Pt stated goals: "To be able to function with less help."  Family present/states: daughter present: "To see her get stronger and more confident moving around."  Pain: throughout RLE 7/10     Objective   - Follows commands: 80% of time   - Speech: difficulty with word finding     Mental status: alert, oriented to person, place; somewhat anxious; pt became tearful momentarily when talking about having to rely on everyone to take care of her  Appearance: Casually " dressed  Behavior:  cooperative  Attention Span and Concentration: occasionally tangential, needs to be directed to topic     Dominant hand:  Right     Posture Alignment in sitting and standing:   Head: forward head   Scapulae: slouched posture   Trunk: slouched posture   Pelvis: Posteriorly tilted   Legs: decreased knee extension     Sensation: Light Touch:Intact throughout BLE                    Tone: WFL     Visual/Auditory: reports slight decrease in peripheral vision B     Coordination:   - UE coordination: slight impairment with LUE finger to nose. RUE WFL  - LE coordination:  slight impairment with LLE with toe tapping and heel to shin; RLE grossly WFL     ROM:   UPPER EXTREMITY--AROM/PROM  (R) and (L) UE: Decreased AROM and PROM shoulder abduction, flexion; PROM appears to be limited by capsule tightness over strength deficit         RANGE OF MOTION--LOWER EXTREMITIES  (R) and (L) LE Hip: normal                           Knee: lacks ~5 degrees flexion                          Ankle: WFL     Strength: manual muscle test grades below        Lower Extremity Strength  Right LE  10/10/17 12/12/17 Left LE  10/10/17 12/12/17   Hip Flexion: 3/5 4-/5 Hip Flexion: 4/5 4-/5   Hip Abduction: 4/5 4+/5 Hip Abduction: 4/5 4-/5   Hip Adduction: 5/5 4+/5 Hip Adduction 4+/5 4+/5   Knee Extension: 4+/5 4+/5 Knee Extension: 4+/5 4/5   Knee Flexion: 5/5 5/5 Knee Flexion: 4+/5 3+/5   Ankle Dorsiflexion: 5/5 5/5 Ankle Dorsiflexion: 5/5 5/5      Abdominal Strength: 3/5       Evaluation- 10/10/17 12/12/17   Single Limb Stance R LE NT NT   Single Limb Stance L LE NT NT   30 second Chair Rise 10 completed w/o UE support 7 completed with no UE support, CGA      Postural control:  MCTSIB:  1. Eyes Open/feet together/Firm: 30 seconds  2. Eyes Closed/feet together/Firm: 30 seconds  3. Eyes Open/feet together/Foam: 30 seconds  4. Eyes Closed/feet together/Foam: 11 seconds     Gait Assessment:   - AD used: SPC (RT hand)  - Assistance:  none  - Distance: ~120ft with SBA to CGA     GAIT DEVIATIONS:  Selma displays the following deviations with ambulation: decreased step length on RLE, trendelenburg on L hip, decreased step width nadiya, forward trunk lean, increased R hip adduction in TSW/IC     PT tested pt's ambulation with RW for comparison to ambulation trial c/ SPC. During ambulation trial with RW, Pt demonstrates difficulty keeping RW in contact with ground during turning. Additionally, LLE continuously hit L side of RW, placing patient at an increased fall risk if L foot is to catch RW. Therefore, recommend ambulation with SPC at this time over RW.     Impairments contributing to deviations: impaired motor control, decreased LE strength, impaired coordination, visual deficits     Endurance Deficit: impaired endurance throughout evaluation       Evaluation 12/12/17   Timed Up and Go 11 sec c/ SPC 18 sec c/ SPC and CGA  24 sec c/ RW and CGA   Self Selected Walking Speed 0.71 m/sec (6m/8.5s)  C/ SPC 0.67 m/sec (6m/9s) c/ RW  0.67 m/sec (6m/9s) c/ SPC  CGA for both trials   Fast Walking Speed NT NT      Functional Gait Assessment: not safe to attempt today     Functional Limitations Reports - G Codes  Category: Mobility  Tool: TUG  Score: 18 sec c/ SPC  Current: CL at least 60% < 80% impaired, limited or restricted  Goal: CJ at least 20% < 40% impaired, limited or restricted     Education provided re:role of PT, goals for PT, scheduling - pt verbalized understanding. Discussed insurance limitations with pt.     Pt and her daughter both pleasant throughout conversation regarding POC. PT spoke with patient and her daughter regarding request and expectations regarding potential inpatient rehab placement. Pt's daughter reports that she would like her mother to have the opportunity to participate in more intensive therapy to improve patient's independence and safety with functional mobility, ambulation, ADL, and memory in order to improve her mother's  QoL and her confidence with her ability to perform daily activities. Pt's daughter reports that her father had attended inpatient rehab after he had suffered a CVA and that the experience had helped him tremendously in regaining mobility and confidence in his ability to do for himself. Pt's daughter is hoping that her mother will be afforded a similar opportunity. PT informed pt's daughter that pt could benefit from inpatient rehab stay to improve overall mobility, safety with ADL, confidence with ambulation and functional mobility, and memory, but that patient my require some (A) even after rehab stay. Pt's daughter also acknowledges that patient may need some (A) with IADLs due to age related deficits even after rehab participation, but that pt's family would like to see what improvements patient is able to accomplish with intensive therapy before determining what (A) will need to be set up to maintain patient's safety and well being. Pt's daughter reports that she does not want her mother only recently received the diagnosis of a CVA and did not get the opportunity to attend rehab sooner due to the delayed diagnosis. PT to call pt's  Domonique Baez to relay this conversation. Pt and pt's daughter agreeable.      Selma verbalized fair understanding of education provided.   Pt has no cultural, educational or language barriers to learning provided.        Assessment   This is a 82 y.o. female referred to outpatient physical therapy and presents with a medical diagnosis of hemichorea 2/2 R caudate lacunar infarction . Patient presents with gait deviations, impaired dynamic balance, decreased LE (LLE > RLE), and is at increased risk of falling. Since initial evaluation on 10/10/17, pt demonstrates a decline in TUG score, SSWS score, BLE strength scores, and MCTSIB condition 4 score which is concordant to pt's family's reports that patient has demonstrated a significant decline in overall mobility and  ability to care for herself over the past months. In addition to the mobility deficits listed above, pt also presents with fear of falling, impaired vision, and impaired memory which contribute to her being at a high fall risk at this time. Pt to benefit from continued PT intervention to address mobility deficits listed above, to improve independence and safety with functional mobility/ADL, and improve confidence with mobility.     After re-evaluating patient's current state of mobility and speaking with patient and her daughter regarding realistic expectations concerning an inpatient rehab stay, pt could benefit from attending inpatient rehab for intensive multi-discplinary therapy services to address her BUE/BLE strength deficits, impaired ambulation, impaired balance, impaired independence with functional transfers and ADL, impaired memory, and impaired confidence with her current ability to move. Although, patient has been able to attend OP PT, she has not been able to do so consistently due to transportation and memory issues. Additionally, inpatient rehab will be able to provide patient with more intensive therapy services (PT/OT/SLP) along with additional peer support from other patients undergoing similar experiences than OP therapy can provide at this time.     Therefore, PT to see patient on OP PT basis at this time to prevent further mobility decline while inpatient rehab application process is started. Pt has OP SLP evaluation set up currently. Additionally, recommend OP OT evaluation for address BUE ROM and strength deficits.        PT/PTA met face to face to discuss pt's treatment plan and established goals. Pt will be seen by physical therapy every 6th visit and minimally once per month.      Pt rehab potential is Good. Pt will benefit from continuing skilled outpatient physical therapy to address the deficits listed below in the problem list, provide pt/family education and to maximize pt's level of  independence in the home and community environment.      History  Co-morbidities and personal factors that may impact the plan of care Examination  Body Structures and Functions, activity limitations and participation restrictions that may impact the plan of care. Medical necessity is demonstrated by the following impairments. Clinical Presentation Decision Making/ Complexity Score   1. OA of multiple joints  2. LBP  3. Visual deficits  4. Cognitive deficits  5. B rotator cuff repair  6. Vascular dementia 1. Decreased balance  2. Gait deficits  3. Increased fall risk  4. Decreased LE strength  5. Impaired endurance  6. Postural deficits evolving-unpredictable symptom presentation, cognitive deficits     high high moderate moderate         Pt's spiritual, cultural and educational needs considered and pt agreeable to plan of care and goals as stated below:      GOALS:   Short term goals: 4 weeks, pt agrees to goals set.  7. Pt to report performing HEP daily to increased IND.  8. Pt to demonstrate nadiya hip flexion MMT score to 4/5 to improve functional mobility.   9. Pt to demonstrate L hip abduction MMT score to 4/5 to improve functional mobility.   10. Pt to improve L knee extension MMT score to 4/5 to improve functional mobility  11. Pt to improve L knee flexion MMT score to 4/5 to improve functional mobility  12. Pt to improve chair rise score to at least 8 with no hands for improved endurance with functional mobility  13. Pt to improve MCTSIB condition 4 score to at least 15 seconds for improved balance and decreased fall risk.   14. Pt to improve TUG score with SPC to no more than 16 seconds for improved safety with ambulation  15. Pt to improve SSWS to at least 0.75 m/sec for improved safety with community ambulation.     Long term goals: 8 weeks, pt agrees to goals set  11. Pt to demonstrate more upright standing/seated head/shoulder posture to improve balance.  12. Pt to improve SSWS to at least 0.86m/sec for  improved safety with community ambulation.   13. Pt to demonstrate nadiya hip flexion strength of 4+/5 to improve functional mobility.  14. Pt to demonstrate L hip abduction strength to 4+/5 to improve functional mobility.  16. Pt to improve L knee extension MMT score to 4/+5 to improve functional mobility  17. Pt to improve L knee flexion MMT score to 4+/5 to improve functional mobility  18. Pt to improve chair rise score to at least 9 with no hands for improved endurance with functional mobility  19. Pt to improve MCTSIB condition 4 score to at least 20 seconds for improved balance and decreased fall risk.   20. Pt to improve TUG score with SPC to no more than 14 seconds for improved safety with ambulation  21. Pt to improve FOTO score to at least 60% for improved self concept with functional mobility.         Plan   Outpatient physical therapy 2 times weekly to include: Pt Education, HEP, therapeutic exercises, neuromuscular re-education, therapeutic activities, manual therapy, joint mobilizations, aquatic therapy and modalities PRN to achieve established goals. Pt may be seen by PTA as part of the rehabilitation team.      Cont PT for  8 weeks.      Kayleen Bañuelos, PT  10/10/2017

## 2017-12-12 NOTE — PROGRESS NOTES
12/12/17  NATALIA collaborated with Shara Lagos MA in NEURO. She will fax the generic VA forms they have used for past admissions to [facility] facilities this afternoon after clinic.   NATALIA messaged Dr. Lee in response to new information gathered from family after sending initial message yest to PCP---that pt/family/Neuro are focused on rehab for pt and NOT placement. Plan to facilitate to [facility] CM.   NATALIA spoke with daughter, Rosy to verify pt/family's choice in Inpt Rehab facilities. Rosy reports absolutely, no for [facility] Inpt Rehab. No other choices made. Pt/Family waiting for Stroke Specialist to eval pt status and recommend treatment on 12/22.   Update message to Dr. Lee sent--- everything pending 12/22/17 appt.   NATALIA collaborated with Aracely Quintana LCSW.     Plan  F/u on 12/18 as planned with john Gallegos.

## 2017-12-12 NOTE — TELEPHONE ENCOUNTER
If patient requires evaluation, forms, etc from us, then will need an appointment with me with family present. My understanding is that they are not amenable to [facility] inpatient services.    Has patient applied for VA benefits or care?    I will also place a referral to [facility] ([facility]) for evaluation.    Please expedite appointments with rehab PMR and with Stroke neurology. Thank you.

## 2017-12-12 NOTE — TELEPHONE ENCOUNTER
----- Message from Bhargav Lee MD sent at 12/12/2017 12:25 PM CST -----  Contact: Domonique Baez RN      ----- Message -----  From: Domonique Baez RN  Sent: 12/12/2017   9:51 AM  To: Aracely Quintana LCSW, [facility] Staff    Dr. Lee/Staff:    New information with each conversation with family. Today, daughter, Rosy reports that [facility] is NOT their first choice. Rosy says she would prefer to WAIT until they see the Stroke Specialist 12/22 and getting their recommendations for treatment going forward.  The family has the Senior Resource Guide to review various facilities.   Any plans to meet with PCP are pending. Daughter aware that PCP would assist with paperwork for an Inpt Rehab admission. Case Management to assist as well.     Apologizes for the many messages.   Sincerely,  OWEN Jerez, RN, CCM [facility] Case [facility]  TEL:  [phone]

## 2017-12-12 NOTE — PROGRESS NOTES
12/12/17  Update from Kayleen Bañuelos, PT in OP received after evaluating pt today. Kayleen reports a productive conversation with pt/daughter, Rosy. Kayleen is mindful of Inpt Rehab goals for pt and to incorporate in her eval/goals. Kayleen spoke with pt/dgt about post rehab goals/expectations in that pt would require assistance in her care. Dgt presents as realistic regarding pt safety and wishes  maximize pt's physical strength, mobility & confidence. This CM notified Kayleen that the idea of [facility] Rehab is not an option per Rosy's report to CM.  The pt/family are awaiting treatment recs per Stroke Specialist, Dr. DEDRA Tobias.

## 2017-12-13 DIAGNOSIS — R53.81 DEBILITY: Primary | ICD-10-CM

## 2017-12-13 NOTE — TELEPHONE ENCOUNTER
Spoke to pt scheduled apt for pt and family to come in and discuss option for inpatient care. Pt daughter would like for you to reach out to the neurology Dr. De La Torre to share information on pt care.  Please advise

## 2017-12-14 ENCOUNTER — OUTPATIENT CASE MANAGEMENT (OUTPATIENT)
Dept: ADMINISTRATIVE | Facility: OTHER | Age: 82
End: 2017-12-14

## 2017-12-14 ENCOUNTER — CLINICAL SUPPORT (OUTPATIENT)
Dept: REHABILITATION | Facility: HOSPITAL | Age: 82
End: 2017-12-14
Attending: PHYSICAL MEDICINE & REHABILITATION
Payer: MEDICARE

## 2017-12-14 ENCOUNTER — TELEPHONE (OUTPATIENT)
Dept: INTERNAL MEDICINE | Facility: CLINIC | Age: 82
End: 2017-12-14

## 2017-12-14 DIAGNOSIS — H91.90 HEARING LOSS, UNSPECIFIED HEARING LOSS TYPE, UNSPECIFIED LATERALITY: Primary | ICD-10-CM

## 2017-12-14 DIAGNOSIS — Z74.09 IMPAIRED FUNCTIONAL MOBILITY, BALANCE, GAIT, AND ENDURANCE: ICD-10-CM

## 2017-12-14 PROCEDURE — 97116 GAIT TRAINING THERAPY: CPT | Mod: PO

## 2017-12-14 PROCEDURE — 97110 THERAPEUTIC EXERCISES: CPT | Mod: PO

## 2017-12-14 NOTE — PATIENT INSTRUCTIONS
With neutral spine, tighten pelvic floor and abdominals and hold. Alternating legs, slide heel to bottom. Repeat ___ times. Do ___ times a day.      Copyright © I. All rights reserved.   ABDUCTION: Supine (Active)        Lie on back, legs straight. Draw right leg out to the side as far as possible. Use ___ lbs.  Complete ___ sets of ___ repetitions. Perform ___ sessions per day.    Copyright © [employer]. All rights reserved.   Ankle Pumps        Lie with knees supported on bolster or sit. Straighten one knee. Keep knee straight; alternately press out and pull up heel. Emphasize movement of heel.  Repeat ___ times.    Copyright © [employer]. All rights reserved.   Gluteal Sets        Squeeze pelvic floor and hold. Tighten bottom. Hold for ___ seconds. Relax for ___ seconds. Repeat ___ times. Do ___ times a day.      Copyright © [employer]. All rights reserved.   KNEE: Extension, Short Arc Quads - Supine        Place bolster under knees. Raise one leg until knee is straight. ___ reps per set, ___ sets per day, ___ days per week      Copyright © I. All rights reserved.   Bracing With Bridging (Hook-Lying)        With neutral spine, tighten pelvic floor and abdominals and hold. Lift bottom. Repeat ___ times. Do ___ times a day.      Copyright © I. All rights reserved.

## 2017-12-14 NOTE — PROGRESS NOTES
Patient's daughters, Dr. Lomas and Ravi Rosado, returned call.  Care coordination reviewed.  Daughters report that patient will be seeing OP PT for now.  A decision on IP Rehab or other placement has not yet been made.  Daughters would like a referral for a hearing assessment.  LCSW sent request to P Dr. Lee via [employer] and requested patient/family follow-up to advise on the referral.  Daughters requested patient/family meeting on Monday, December 18, 1:00 PM, [facility] to discuss placement options.  They prefer no to utilize [facility] services at this time for patient.  They will also be meeting with Dr. Lee on 12/18/17 at 2:30 pm to discuss placement.  Dr. Lomas said she has extended her stay in [place] until Tuesday, 12/19/17.  Care coordinated with OPCM RN Domonique Baez via e-mail. LCSW will continue to follow.

## 2017-12-14 NOTE — PROGRESS NOTES
"                                                    Physical Therapy Progress Note     Name: Selma Rosado MD Jose J  Clinic Number: [id number]  Diagnosis:   Encounter Diagnosis   Name Primary?    Impaired functional mobility, balance, gait, and endurance      Physician: Dee Thomson, *  Treatment Orders: PT Eval and Treat  Past Medical History:   Diagnosis Date    Anemia     Arthritis     Hashimoto's disease     Hypertension     IGT (impaired glucose tolerance) 1/12/2016    Joint pain     Multinodular goiter 1/12/2016     Re-Evaluation Date: 12/12/17  Visit #: [id number]  Plan of care expiration: 12/5/2017  Extended POC Expiration: 02/06/17  Precautions: universal, visual impairment, impaired memory      G codes 2/10              Subjective   Pt reports: " I don't feel like myself today so I'm not sure how I feel."   Pain Scale:  Neck pain    Objective     Patient received individual therapy with activities as follows:   Therapeutic exercises to increase strength and endurance x 45 min including;      X 8 min on Nu Step recumbent stepper.  Level 5.0  Sit to supine with Supervision  X 30 reps of B LE APs with YTB  3 x 10 reps of B LE SAQ over bolster with 3 sec hold.  2 x 10 reps of B LE heel slides  1 x 10 reps of Bridges   2 x 10 reps of B LE SLR  Supine to sit with Min A/CGA to elevate trunk.    Gait:x 8 min  Pt ambulated  ~ 170ft in/out of gym with SC and CGA/close SBA.  Pt with lateral steps out, but no loss of balance.     Written Home Exercises:  Supine Quad sets, SLR, bridges, hip abd/add, Heel slides  Pt demo good understanding of the education provided. Selma demonstrated good return demonstration of activities.     Education provided re: POC, HEP  No spiritual or educational barriers to learning provided    Pt has no cultural, educational or language barriers to learning provided.    Assessment   Selma tolerated her first tx session following initial evaluation fairly well despite neck pain and " some L arm pain.  Pts arm pain subsided after the stepper and pt was able to complete all supine therex.  Pt educated on supine HEP and was able to demonstrate understanding.  Cont with POC.   Pt rehab potential is Good. Pt will benefit from continuing skilled outpatient physical therapy to address the deficits listed below in the problem list, provide pt/family education and to maximize pt's level of independence in the home and community environment.      History  Co-morbidities and personal factors that may impact the plan of care Examination  Body Structures and Functions, activity limitations and participation restrictions that may impact the plan of care. Medical necessity is demonstrated by the following impairments. Clinical Presentation Decision Making/ Complexity Score   1. OA of multiple joints  2. LBP  3. Visual deficits  4. Cognitive deficits  5. B rotator cuff repair  6. Vascular dementia 1. Decreased balance  2. Gait deficits  3. Increased fall risk  4. Decreased LE strength  5. Impaired endurance  6. Postural deficits evolving-unpredictable symptom presentation, cognitive deficits     high high moderate moderate         Pt's spiritual, cultural and educational needs considered and pt agreeable to plan of care and goals as stated below:      GOALS:   Short term goals: 4 weeks, pt agrees to goals set.  7. Pt to report performing HEP daily to increased IND.  8. Pt to demonstrate nadiya hip flexion MMT score to 4/5 to improve functional mobility.   9. Pt to demonstrate L hip abduction MMT score to 4/5 to improve functional mobility.   10. Pt to improve L knee extension MMT score to 4/5 to improve functional mobility  11. Pt to improve L knee flexion MMT score to 4/5 to improve functional mobility  12. Pt to improve chair rise score to at least 8 with no hands for improved endurance with functional mobility  13. Pt to improve MCTSIB condition 4 score to at least 15 seconds for improved balance and decreased  fall risk.   14. Pt to improve TUG score with SPC to no more than 16 seconds for improved safety with ambulation  15. Pt to improve SSWS to at least 0.75 m/sec for improved safety with community ambulation.     Long term goals: 8 weeks, pt agrees to goals set  11. Pt to demonstrate more upright standing/seated head/shoulder posture to improve balance.  12. Pt to improve SSWS to at least 0.86m/sec for improved safety with community ambulation.   13. Pt to demonstrate nadiya hip flexion strength of 4+/5 to improve functional mobility.  14. Pt to demonstrate L hip abduction strength to 4+/5 to improve functional mobility.  16. Pt to improve L knee extension MMT score to 4/+5 to improve functional mobility  17. Pt to improve L knee flexion MMT score to 4+/5 to improve functional mobility  18. Pt to improve chair rise score to at least 9 with no hands for improved endurance with functional mobility  19. Pt to improve MCTSIB condition 4 score to at least 20 seconds for improved balance and decreased fall risk.   20. Pt to improve TUG score with SPC to no more than 14 seconds for improved safety with ambulation  21. Pt to improve FOTO score to at least 60% for improved self concept with functional mobility.     Plan   Outpatient physical therapy 2 times weekly to include: Pt Education, HEP, therapeutic exercises, neuromuscular re-education, therapeutic activities, manual therapy, joint mobilizations, aquatic therapy and modalities PRN to achieve established goals. Pt may be seen by PTA as part of the rehabilitation team.      Cont PT for  8 weeks.     Katerine Mchugh, PTA   12/14/2017

## 2017-12-14 NOTE — TELEPHONE ENCOUNTER
----- Message from Aracely Quintana LCSW sent at 12/14/2017 12:13 PM CST -----  Contact: Dr. Lomas, daughter, [phone]  Mrs. Lux's daughter, Dr. Lomas, is requesting a patient referral for a hearing assessment.  Please contact Dr. Lomas at the above telephone number to discuss and refer as appropriate.    Thank you,    Aracely Quintana

## 2017-12-14 NOTE — PROGRESS NOTES
Attempted to contact patient's daughter, Dr. Lomas, for a scheduled telephonic follow-up visit with patient/family.  Left voice mail message with telephone number for call back. LCSW will attempt to contact again at a later date.

## 2017-12-18 ENCOUNTER — OUTPATIENT CASE MANAGEMENT (OUTPATIENT)
Dept: ADMINISTRATIVE | Facility: OTHER | Age: 82
End: 2017-12-18

## 2017-12-18 ENCOUNTER — TELEPHONE (OUTPATIENT)
Dept: INTERNAL MEDICINE | Facility: CLINIC | Age: 82
End: 2017-12-18

## 2017-12-18 NOTE — PROGRESS NOTES
12/18/17  Collaborated with Aracely Quintana LCSW in meeting with pt/family today. Daughter, called DHARMESHW back postponing meeting until tomorrow. Pt continues with OP PT.

## 2017-12-18 NOTE — PROGRESS NOTES
Patient's daughter, Ms. Rosado, called to cancel today's appointment with this LCSW.  Visit scheduled for tomorrow at 1:00 PM at [facility].  Case collaboration with OPCM RN Domonique Baez.

## 2017-12-18 NOTE — TELEPHONE ENCOUNTER
No show for appt today.    Daughter had indicated to staff/CSW that they were waiting to see the stroke team prior to any decision making.

## 2017-12-19 ENCOUNTER — CLINICAL SUPPORT (OUTPATIENT)
Dept: REHABILITATION | Facility: HOSPITAL | Age: 82
End: 2017-12-19
Attending: STUDENT IN AN ORGANIZED HEALTH CARE EDUCATION/TRAINING PROGRAM
Payer: MEDICARE

## 2017-12-19 ENCOUNTER — OUTPATIENT CASE MANAGEMENT (OUTPATIENT)
Dept: ADMINISTRATIVE | Facility: OTHER | Age: 82
End: 2017-12-19

## 2017-12-19 DIAGNOSIS — R41.841 COGNITIVE COMMUNICATION DEFICIT: ICD-10-CM

## 2017-12-19 DIAGNOSIS — R47.1 DYSARTHRIA: ICD-10-CM

## 2017-12-19 PROCEDURE — 96125 COGNITIVE TEST BY HC PRO: CPT | Mod: PO

## 2017-12-19 PROCEDURE — G9168 MEMORY CURRENT STATUS: HCPCS | Mod: CK,PO

## 2017-12-19 PROCEDURE — G9169 MEMORY GOAL STATUS: HCPCS | Mod: CJ,PO

## 2017-12-19 NOTE — PROGRESS NOTES
"LCSW facilitated family meeting at [facility]. Present were patient, daughters Tala Rosado and Dr. Lomas, [facility] RN Domonique Baez, and this LCSW.  Patient was able to state her name, date of birth, home address, year, and [place].  Patient dozed off several times throughout the meeting.  Patient's daughters report that patient has been "leaning forward" in her chair lately, which is something they report as being new. Patient/daughters report that patient has had consistency with caregiving, meals, and medication administration.  They report that patient has been taking Valium as prescribed. They report a decrease in patient's tremors and attribute the improvement to enhanced caregiving and medication management/administration.  They report that patient continues to be forgetful.  LCSW suggested placing reminder signs throughout the house to assist patient with memory deficits.  Patient/daughters report that patient sees an [facility] psychologist and said they wanted to keep that information private.  Patient/daughters said they will be seeing an  tomorrow to work on POA and other legal matters.  LCSW provided education on Advance Directives and MPOA.  Patient/daughters were asked to provide a copy of Advance Directives and MPOA to patient's medical provider(s) so there is a record in patient's Epic chart.  Patient/family said patient will be seeing a Dr. Tobias, [facility], on Friday.  They will discuss IP Rehab placement and patient's ability to endure treatment there, and will make a decision regarding possible placement after that meeting.  Patient/family said they have a backup plan in place should patient not be a good candidate for IP Rehab.  They have someone they know who will be able to stay with patient 24/7.  Tala Rosado said she will be staying with patient until a plan for patient's care has been established and implemented.  LCSW discussed the process of IP Rehab " placement.  The family was informed that, historically, it is difficult for a patient to be placed into an IP facility directly from home, and that the insurance company would have to approve placement.  Resources were provided to both daughters on IP Rehab facilities to assist patient/family on making a placement decision, should that be warranted.  Information also provided to both daughters on SNFs and the placement process.  Resources were provided to both daughters on SNF facilities to assist patient/family on making a placement decision, should that be warranted.  Additional resource information provided:  Support groups; Organizations for specific illnesses; Food resources; Home care/ agencies; Emergency response systems/agencies; and the Senior Resource guide.  Patient/family informed that this LCSW will be off from work next week.  Case collaborated with Dr. Lee and OPCM RN Domonique Baez after the meeting with the patient and her daughters was over.  LCSW will continue to follow.    Interventions: Per POC    Plan:  Follow-up on caregiving/placement needs  Provide support as needed.

## 2017-12-19 NOTE — PLAN OF CARE
"Date: 12/19/2017      Start Time:  1045  Stop Time:  1210      OUTPATIENT NEUROLOGICAL REHABILITATION  SPEECH THERAPY EVALUATION    Subjective/History  Onset Date:  Approximately 1 to 2 years ago  Primary Diagnosis:  Cognitive Impairment, Hemichorea, Debility  Treatment Diagnosis:    1. Cognitive communication deficit     2. Dysarthria        Referring Provider:  Dr. 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 Her daughters, Rosy ("Susu") and Madhavi, were present during this evaluation. They provided some pertinent history. They are in the process of getting Power of  to help manage her care. Dr. Lux was leaning forward and then to her left side along with constant extraneous movements during the evaluation.   Past Medical History:   Past Medical History:   Diagnosis Date    Anemia     Arthritis     Hashimoto's disease     Hypertension     IGT (impaired glucose tolerance) 1/12/2016    Joint pain     Multinodular goiter 1/12/2016   MRI of Brain:  (8/30/17) "No acute intracranial abnormalities.  Specifically, no acute infarction or enhancing mass lesions. Punctate remote infarct within the right centrum semiovale, left anterior thalamus and right sultana-tatyana.Age advanced chronic microvascular ischemic changes."  CT of Brain:  (12/01/17) " No acute intracranial abnormality identified. 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 Her daughters live out of town but are very supportive. She has a sister, who lives next door, and other family members in [place]. 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 She is no longer driving.  Signs of Abuse: No  Functional Deficits Leading to Referral/Nature of Injury:  Decreased memory, decreased word finding, decreased problem solving  Patient Therapy Goals:  " I want you to find some things that I lost."    Objective   Cognitive Linguistic Quick Test (CLQT), Aphasia Administration was administered to quickly assess the patient's overall cognitive-linguistic function and to determine cognitive strengths and weaknesses.           Cognitive Domain Score     Severity Rating      Attention    130 / 215 mild  Memory    137 / 185 mild   Executive Functions   11 / 40  severe  Language    29 / 37  WNL  Visuospatial Skills    49 / 105 mild  Clock Drawing    5 / 13  severe    Non-linguistic Cognition  20 / 49  severe  Linguistic / Aphasia    47 / 56  mild    Composite Severity Rating  3 / 4  mild     Description: Dr. Lux was able to answer general personal information questions. She completed confrontational naming and responsive naming tasks with 100% accuracy. Difficulty was noted with a symbol cancellation task (75% accuracy), retelling a story exactly as read to her (72% accuracy), and listing items in concrete (6) and abstract (5) categories within one minute (norm = 15 to 20). Dr. Lux also had difficulty with drawing a clock with specific time (39% accuracy). Her numbers were perseverative and spaced incorrectly and she only included one hand for the clock. She had difficulty with manipulating concepts for graduating sizes and then alternating shapes (50% accuracy). Dr. Lux had difficulty tracing a simple maze and a more complex maze (38% accuracy). Immediate memory, planning, mental flexibility were impaired.     Auditory " Comprehension: WFL for questions and commands.     Reading Comprehension: Not formally assessed.  stated that she can not see as well as she used to.     Verbal Expression: Dr. Lux answered questions and engaged in conversation without difficulty. However she and her daughters reported that sometimes she has word finding difficulty.     Written Expression: Not formally assessed.    Cognition: Dr. Lux and her family reported difficulty with handling her finances and household tasks. She reported losing her thoughts. Short term memory, problem solving, and executive function skills were impaired.     Motor Speech/Fluency/Voice: Oral motor exam revealed: Difficulty following oral motor commands. Lingual, labial, and velar muscle strength and ROM were reduced.  Diadochokinetic (DDK) rates for rapid repetition of 1 - 3 syllable utterances were slow, imprecise, and uncoordinated. Voice quality was somewhat muffled and intensity was reduced. Her speech was mostly intelligible until she became tired, then speech intelligibility decreased mildly. Overall speech intelligibility was about 85% in all contexts. Fluency was WFL.    Swallowing: Not formally assessed but no concerns were reported.     Hearing: Bilateral hearing loss (B HF SNHL) documented in audiological evaluation report from 10/5/17. No hearing aids recommended.     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 She exhibited a cognitive-communicative impairment and dysarthria. Her deficits were characterized by decreased short term memory, decreased word finding, decreased problem solving, decreased thought organization, decreased executive function, and decreased mental flexibility. Her speech was mostly intelligible until she became tired, then speech intelligibility decreased mildly. Overall speech  intelligibility was about 85% in all contexts. Due Dr. Lux's cognitive deficits, it is recommended that she have 24 hour supervision. Her daughters are hoping to get her on [facility] inpatient rehabilitation unit. If she is not admitted, then Dr. Lux will benefit from outpatient neurological rehabilitation speech therapy. She would also benefit from an OT consult.     Functional Communication Measure (FCM):   Severity Modifier for Medicare G-Code:  Memory  Current status: FCM:  Level 4   - CK  at least 40% < 60% impaired, limited or restricted  Projected status:  FCM: Level 5   - CJ at least 20% < 40% impaired, limited or restricted    Other SLP Functional Limitation (Problem Solving)  Current status: FCM:  Level 4    - CK  at least 40% < 60% impaired, limited or restricted  Projected status:  FCM:  Level 5   - CJ at least 20% < 40% impaired, limited or restricted       Rehab Potential: good    Short Term Goals (6 weeks):   1. Dr. Lux will complete short term memory tasks (immediate) with 90% acc given min cues over 3 consecutive sessions.   2. Dr. Lux will complete short term memory tasks (delayed) with 90% acc given min cues over 3 consecutive sessions.  3. Dr. Lux will complete mental flexibility/manipulation tasks with 90% acc given min cues over 3 consecutive sessions.  4. Dr. Lux will problem solving, reasoning and thought organization tasks with with 90% acc given min cues over 3 consecutive sessions.  5. Dr. Lux will list 15 to 20 items in concrete categories within one minute over 3 consecutive sessions.  6. Dr. Lux will utilize motor speech strategies (loud, over articulated speech, good breath support) in speech tasks with 90% accuracy given min cues over 3 consecutive sessions.    Long Term Goals (8 weeks):   1. Dr. Lux will use appropriate memory strategies to schedule and recall weekly activities, express needs and recall names to maintain safety and participate  socially in functional living environment.   2. Dr. Lux will demonstrate functional problem solving skills and provide appropriate solutions to problems in order to improve safety and awareness in functional living environment.   3. Dr. Lux will demonstrate use of (self awareness, goal setting, planning, initiation, self-monitoring) during daily living activities to improve safety and awareness in functional living environment.  4. Dr. Lux will develop functional and intelligible speech and utilize compensatory strategies through the use of adequate labial and lingual function, increased articulatory precision and speech prosody.  5. Dr. Lux will demonstrate understanding and use of compensatory strategies to improve functional independence.    Education: Dr. Lux and family were educated on the plan of care and recommendations. They verbalized and demonstrated understanding and agreed with the plan of care.     Plan  Certification Period: 12/14/17 to 12/31/17  Plan of Care Certification Period: 12/19/17 to 02/16/18    Recommended Treatment Plan:  Dr. Lux will participate in the [facility] neurological rehabilitation program for speech therapy 2 times per week to address her Communication, Cognition and Motor Speech deficits, educate patient/family, and home exercise program.    Other Recommendations: 1. OT consult  2. Eye Exam    XUAN Gonzalez, CCC-SLP  Speech-Language Pathologist  [facility]  Neurological Rehabilitation      Date: 12/19/2017     I certify the need for these services furnished under this plan of treatment and while under my care.  ____________________________________ Physician/Referring Practitioner   Date of Signature

## 2017-12-20 ENCOUNTER — OFFICE VISIT (OUTPATIENT)
Dept: INTERNAL MEDICINE | Facility: CLINIC | Age: 82
End: 2017-12-20
Attending: FAMILY MEDICINE
Payer: MEDICARE

## 2017-12-20 VITALS
WEIGHT: 153.5 LBS | SYSTOLIC BLOOD PRESSURE: 116 MMHG | HEIGHT: 65 IN | DIASTOLIC BLOOD PRESSURE: 65 MMHG | BODY MASS INDEX: 25.58 KG/M2

## 2017-12-20 DIAGNOSIS — K27.9 PUD (PEPTIC ULCER DISEASE): ICD-10-CM

## 2017-12-20 DIAGNOSIS — G31.84 MILD COGNITIVE IMPAIRMENT: ICD-10-CM

## 2017-12-20 DIAGNOSIS — M54.50 CHRONIC BILATERAL LOW BACK PAIN WITHOUT SCIATICA: ICD-10-CM

## 2017-12-20 DIAGNOSIS — I10 HYPERTENSION, ESSENTIAL: ICD-10-CM

## 2017-12-20 DIAGNOSIS — G89.4 CHRONIC PAIN SYNDROME: ICD-10-CM

## 2017-12-20 DIAGNOSIS — Z74.09 IMPAIRED FUNCTIONAL MOBILITY, BALANCE, GAIT, AND ENDURANCE: ICD-10-CM

## 2017-12-20 DIAGNOSIS — G89.29 CHRONIC BILATERAL LOW BACK PAIN WITHOUT SCIATICA: ICD-10-CM

## 2017-12-20 DIAGNOSIS — I63.9 CEREBROVASCULAR ACCIDENT (CVA), UNSPECIFIED MECHANISM: Primary | ICD-10-CM

## 2017-12-20 DIAGNOSIS — I67.2 CEREBRAL ATHEROSCLEROSIS: ICD-10-CM

## 2017-12-20 DIAGNOSIS — K58.2 IRRITABLE BOWEL SYNDROME WITH BOTH CONSTIPATION AND DIARRHEA: ICD-10-CM

## 2017-12-20 DIAGNOSIS — G25.5 HEMICHOREA: ICD-10-CM

## 2017-12-20 DIAGNOSIS — M16.0 OSTEOARTHRITIS OF BOTH HIPS, UNSPECIFIED OSTEOARTHRITIS TYPE: ICD-10-CM

## 2017-12-20 DIAGNOSIS — F39 MOOD DISORDER: ICD-10-CM

## 2017-12-20 DIAGNOSIS — E04.2 MULTINODULAR GOITER: ICD-10-CM

## 2017-12-20 DIAGNOSIS — R26.9 ABNORMALITY OF GAIT AND MOBILITY: ICD-10-CM

## 2017-12-20 DIAGNOSIS — R41.89 COGNITIVE IMPAIRMENT: ICD-10-CM

## 2017-12-20 DIAGNOSIS — M15.9 PRIMARY OSTEOARTHRITIS INVOLVING MULTIPLE JOINTS: ICD-10-CM

## 2017-12-20 PROCEDURE — 99213 OFFICE O/P EST LOW 20 MIN: CPT | Mod: PBBFAC | Performed by: FAMILY MEDICINE

## 2017-12-20 PROCEDURE — 99215 OFFICE O/P EST HI 40 MIN: CPT | Mod: S$PBB,,, | Performed by: FAMILY MEDICINE

## 2017-12-20 PROCEDURE — 99999 PR PBB SHADOW E&M-EST. PATIENT-LVL III: CPT | Mod: PBBFAC,,, | Performed by: FAMILY MEDICINE

## 2017-12-20 NOTE — PROGRESS NOTES
12/19/17  This CM participating in meeting facilitated by Aracely Quintana LCSW w/Outpatient Case Management that included patient, Dr. Lux and her two daughters, Marion Rosado and Dr. Sarabjit Sethi. The meeting provided the opportunity for pt/family to discuss their concerns regarding pt's condition and plans to ensure pt has active involvement and assistance in pt's daily care needs to promote compliance with her meds-medical appts/meals/hygiene/safety/ and socialization. Also, discussed were the family's plans to pursue Inpt Rehab as recommended by Stroke Specialist, Dr. Tobias at appt this Fri 12/22/17. Resources for community Inpt Rehabs and [facility] Both daughter voice their understanding that pt requires full assistance/supervision in safe management of pt's ADLs/IADLs. This CM stressed the requirement for Inpt Rehab patients to participate in the therapy program amounting to three hours total daily. Dr. Lux is alert and oriented x 3, forgetful at times. She is amb with straight cane, side leaning posturing while seated. CM encouraged chairs with arms be used whenever possible to max safe transfers and seating; family reports having one at home.   This CM met with Dr. Lee, PCP and Aracely Quintana LCSW following meeting to collaborate of pt care needs.   Please refer to LCSW note from today 12/19/17 for full details of meeting encounter.       Plan for next encounter  F/u on outcome to Dr. Tobias/Neuro appt 12/22  F/u with pt/family

## 2017-12-21 ENCOUNTER — CLINICAL SUPPORT (OUTPATIENT)
Dept: REHABILITATION | Facility: HOSPITAL | Age: 82
End: 2017-12-21
Attending: PHYSICAL MEDICINE & REHABILITATION
Payer: MEDICARE

## 2017-12-21 ENCOUNTER — TELEPHONE (OUTPATIENT)
Dept: NEUROLOGY | Facility: CLINIC | Age: 82
End: 2017-12-21

## 2017-12-21 DIAGNOSIS — R53.81 DEBILITY: Primary | ICD-10-CM

## 2017-12-21 DIAGNOSIS — Z74.09 IMPAIRED FUNCTIONAL MOBILITY, BALANCE, GAIT, AND ENDURANCE: ICD-10-CM

## 2017-12-21 DIAGNOSIS — R41.841 COGNITIVE COMMUNICATION DEFICIT: ICD-10-CM

## 2017-12-21 DIAGNOSIS — R47.1 DYSARTHRIA: ICD-10-CM

## 2017-12-21 PROCEDURE — 97532 *HC SP COG SKL DEV EA 15 MIN: CPT | Mod: PO

## 2017-12-21 PROCEDURE — 97110 THERAPEUTIC EXERCISES: CPT | Mod: PO

## 2017-12-21 PROCEDURE — 97112 NEUROMUSCULAR REEDUCATION: CPT | Mod: PO

## 2017-12-21 NOTE — PROGRESS NOTES
"OUTPATIENT NEUROLOGICAL REHABILITATION  SPEECH THERAPY PROGRESS NOTE    Date:  12/21/2017     Start Time:  955  Stop Time:  1030    Subjective/History  Onset Date:  Approximately 1 to 2 years ago  Primary Diagnosis:  Cognitive Impairment, Hemichorea, Debility  Treatment Diagnosis:    1. Cognitive communication deficit      2. Dysarthria         Referring Provider:  Dr. 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     Past Medical History:   Past Medical History:   Diagnosis Date    Anemia     Arthritis     Hashimoto's disease     Hypertension     IGT (impaired glucose tolerance) 1/12/2016    Joint pain     Multinodular goiter 1/12/2016     Precautions:  Memory loss        TIMED  Procedure Time Min.    Cognitive therapy Start:  0955  Stop:  1030   35         UNTIMED  Procedure Time Min.     Start:    Stop:          Start:   Stop:        Total Minutes: 35  Total Timed Units: 2  Total Untimed Units: 0  Charges Billed/# of units: 2    Visit #: 2  Date of Evaluation:  12/19/17  Plan of Care Expiration:    02/16/18  Extended POC:    G-CODE   2/10    eval   CK Memory   update          Progress/Current Status    Subjective:     Pain: 0 /10  "I've got out of my concentration." No family present today.    Objective:     Short Term Goals: (6 weeks) Current Progress:   1. Dr. Lux will complete short term memory tasks (immediate) with 90% acc given min cues over 3 consecutive sessions.    - Pt repeated 3 words with 100% acc IND'ly  - Pt repeated 4 words with 1/2 IND'ly, 2/2 given a repetition  - Pt repeated 3 words in reverse order with 100% acc IND'ly; 4 words with 0% acc.  (Instructed pt to repeat words like SLP first and then reverse as a strategy to remember the words.)   "   2. Dr. Lux will complete short term memory tasks (delayed) with 90% acc given min cues over 3 consecutive sessions. - Pt discussed specific people (from past and present) associated with places with 100% acc IND'ly.    3. Dr. Lux will complete mental flexibility/manipulation tasks with 90% acc given min cues over 3 consecutive sessions. -  Pt repeated 3 words in reverse order with 100% acc IND'ly; 4 words with 0% acc.   4. Dr. Lux will problem solving, reasoning and thought organization tasks with with 90% acc given min cues over 3 consecutive sessions. - Demonstrated how to use her cell phone and recognize her frequent contacts as favorites. Pt could not figure out how to get from screen to screen. She thought her phone was not working. 0% acc IND'ly   5. Dr. Lux will list 15 to 20 items in concrete categories within one minute over 3 consecutive sessions. - Pt listed 16 food items within one minute.   6. Dr. Lux will utilize motor speech strategies (loud, over articulated speech, good breath support) in speech tasks with 90% accuracy given min cues over 3 consecutive sessions. - Pt's speech was 100% intelligible today.         Long Term Goals (8 weeks):   1. Dr. Lux will use appropriate memory strategies to schedule and recall weekly activities, express needs and recall names to maintain safety and participate socially in functional living environment.   2. Dr. Lux will demonstrate functional problem solving skills and provide appropriate solutions to problems in order to improve safety and awareness in functional living environment.   3. Dr. Lux will demonstrate use of (self awareness, goal setting, planning, initiation, self-monitoring) during daily living activities to improve safety and awareness in functional living environment.  4. Dr. Lux will develop functional and intelligible speech and utilize compensatory strategies through the use of adequate labial and lingual function, increased  articulatory precision and speech prosody.  5. Dr. Lux will demonstrate understanding and use of compensatory strategies to improve functional independence.    Assessment:   Pt was more alert today. Decreased attention and concentration during the session. Current goals remain appropriate. Goals to be updated as necessary.      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 She presents with impaired short term memory, word finding, problem solving, thought organization, executive function, and mental flexibility skills. Pt would benefit from continued skilled ST. Prognosis for improvement remains Good     Patient Education/Response:   Pt educated on use of her cell phone and memory strategies such as calendar, repeating words. She verbalized understanding.     Home Program: practice using her cell phone and identify which phone numbers are most important under favorites.    Plans and Goals:     Continue POC        Other Recommendations: 1. OT consult  2. Eye Exam     XUAN Gonzalez, CCC-SLP  Speech-Language Pathologist  [facility]  Neurological Rehabilitation

## 2017-12-21 NOTE — TELEPHONE ENCOUNTER
----- Message from XUAN Gray, CCC-SLP sent at 12/20/2017  9:19 AM CST -----  Regarding: referral  Hi Dr. Giang,    Please write an order for Dr. Lux to receive outpatient OT. She is getting ST and PT but needs OT too.     Thanks,  Rupal

## 2017-12-21 NOTE — PROGRESS NOTES
"                                                    Physical Therapy Progress Note     Name: Selma Alonzo Lux MD  Clinic Number: [id number]  Diagnosis:   Encounter Diagnosis   Name Primary?    Impaired functional mobility, balance, gait, and endurance      Physician: Dee Thomson, *  Treatment Orders: PT Eval and Treat  Past Medical History:   Diagnosis Date    Anemia     Arthritis     Hashimoto's disease     Hypertension     IGT (impaired glucose tolerance) 1/12/2016    Joint pain     Multinodular goiter 1/12/2016     Re-Evaluation Date: 12/12/17  Visit #: 8  Plan of care expiration: 12/5/2017  Extended POC Expiration: 02/06/17  Precautions: universal, visual impairment, impaired memory      G codes 3/10              Subjective   Pt reports: " I feel like I'm walking worse today. I don't know why." Additionally, pt reports that her upper back is feeling stiff today  Pain Scale:  denies    Objective     Patient received individual therapy with activities as follows:   Therapeutic exercises to increase strength and endurance x  25 min including;      X 8 min on Nu Step recumbent stepper.  Level 1.0    2 x 30" active assisted BUE shoulder AD stretch, seated; VCs/TCS required to perform activity correctly  X 30" seated forward shoulder rolls  X 10, c/ 5" hold seated scap retractions  X 10, c/ 5" hold seated forward theraball roll outs to stretch lower back, active assist    3 x 10 each LE, seated hip flexion c/ 2.0# ankle weights; constant VCs throughout    Gait: Pt ambulated  ~ 170ft in/out of gym with SC and CGA/close SBA.  Pt with lateral steps out, but no loss of balance. Increased time to complete    Patient participated in neuromuscular re-education activities to improve: Balance, Coordination and Posture for 20 minutes. The following activities were included:   X 2 laps forward tandem ambulation c/ 1 UE support, CGA  X 2 laps backward ambulation c/ 1 UE support, CGA    Foam pad:CGA to Min A  X " "30" static stance  X 30" forward chest press with soccer ball  X 30" LTR with soccer ball    X 4 laps forward stepping over 5 rows of small cones in reciprocal pattern, 1 UE support; VCs for proper technique      Written Home Exercises:  Supine Quad sets, SLR, bridges, hip abd/add, Heel slides  Pt demo good understanding of the education provided. Selma demonstrated good return demonstration of activities.     Education provided re: POC, HEP  No spiritual or educational barriers to learning provided    Pt has no cultural, educational or language barriers to learning provided.    Assessment   Selma tolerated therapy session well this morning despite c/o L neck pain, L shoulder pain, and upper back stiffness. Additionally, pt appeared more tired today that prior therapy sessions. Pt appeared more forward flexed, L laterally flexed, and more unsteady during ambulation with cane today which may be attributed to increased fatigue this morning. Therapy session focused on postural activities and stretches to relieve c/o stiffness. Pt did require constant VCs/TCs throughout each activity to perform activities correctly and to maintain on task. At this time, pt does require constant supervision/(A) for safety. Cont. POC to further address remaining mobility deficits.        Pt rehab potential is Good. Pt will benefit from continuing skilled outpatient physical therapy to address the deficits listed below in the problem list, provide pt/family education and to maximize pt's level of independence in the home and community environment.      History  Co-morbidities and personal factors that may impact the plan of care Examination  Body Structures and Functions, activity limitations and participation restrictions that may impact the plan of care. Medical necessity is demonstrated by the following impairments. Clinical Presentation Decision Making/ Complexity Score   1. OA of multiple joints  2. LBP  3. Visual " deficits  4. Cognitive deficits  5. B rotator cuff repair  6. Vascular dementia 1. Decreased balance  2. Gait deficits  3. Increased fall risk  4. Decreased LE strength  5. Impaired endurance  6. Postural deficits evolving-unpredictable symptom presentation, cognitive deficits     high high moderate moderate         Pt's spiritual, cultural and educational needs considered and pt agreeable to plan of care and goals as stated below:      GOALS:   Short term goals: 4 weeks, pt agrees to goals set.  7. Pt to report performing HEP daily to increased IND.  8. Pt to demonstrate nadiya hip flexion MMT score to 4/5 to improve functional mobility.   9. Pt to demonstrate L hip abduction MMT score to 4/5 to improve functional mobility.   10. Pt to improve L knee extension MMT score to 4/5 to improve functional mobility  11. Pt to improve L knee flexion MMT score to 4/5 to improve functional mobility  12. Pt to improve chair rise score to at least 8 with no hands for improved endurance with functional mobility  13. Pt to improve MCTSIB condition 4 score to at least 15 seconds for improved balance and decreased fall risk.   14. Pt to improve TUG score with SPC to no more than 16 seconds for improved safety with ambulation  15. Pt to improve SSWS to at least 0.75 m/sec for improved safety with community ambulation.     Long term goals: 8 weeks, pt agrees to goals set  11. Pt to demonstrate more upright standing/seated head/shoulder posture to improve balance.  12. Pt to improve SSWS to at least 0.86m/sec for improved safety with community ambulation.   13. Pt to demonstrate nadiya hip flexion strength of 4+/5 to improve functional mobility.  14. Pt to demonstrate L hip abduction strength to 4+/5 to improve functional mobility.  16. Pt to improve L knee extension MMT score to 4/+5 to improve functional mobility  17. Pt to improve L knee flexion MMT score to 4+/5 to improve functional mobility  18. Pt to improve chair rise score to at  least 9 with no hands for improved endurance with functional mobility  19. Pt to improve MCTSIB condition 4 score to at least 20 seconds for improved balance and decreased fall risk.   20. Pt to improve TUG score with SPC to no more than 14 seconds for improved safety with ambulation  21. Pt to improve FOTO score to at least 60% for improved self concept with functional mobility.     Plan   Outpatient physical therapy 2 times weekly to include: Pt Education, HEP, therapeutic exercises, neuromuscular re-education, therapeutic activities, manual therapy, joint mobilizations, aquatic therapy and modalities PRN to achieve established goals. Pt may be seen by PTA as part of the rehabilitation team.      Cont PT for  8 weeks.     Kayleen Bañuelos, PT   12/21/2017

## 2017-12-22 ENCOUNTER — OFFICE VISIT (OUTPATIENT)
Dept: NEUROLOGY | Facility: CLINIC | Age: 82
End: 2017-12-22
Payer: MEDICARE

## 2017-12-22 VITALS
SYSTOLIC BLOOD PRESSURE: 115 MMHG | HEART RATE: 93 BPM | WEIGHT: 156.75 LBS | BODY MASS INDEX: 26.12 KG/M2 | HEIGHT: 65 IN | DIASTOLIC BLOOD PRESSURE: 65 MMHG

## 2017-12-22 DIAGNOSIS — F01.50 VASCULAR DEMENTIA WITHOUT BEHAVIORAL DISTURBANCE: ICD-10-CM

## 2017-12-22 DIAGNOSIS — G25.5 HEMICHOREA: ICD-10-CM

## 2017-12-22 DIAGNOSIS — I67.9 SMALL VESSEL DISEASE, CEREBROVASCULAR: ICD-10-CM

## 2017-12-22 DIAGNOSIS — R41.89 COGNITIVE IMPAIRMENT: ICD-10-CM

## 2017-12-22 PROCEDURE — 99215 OFFICE O/P EST HI 40 MIN: CPT | Mod: S$PBB,,, | Performed by: PSYCHIATRY & NEUROLOGY

## 2017-12-22 PROCEDURE — 99999 PR PBB SHADOW E&M-EST. PATIENT-LVL III: CPT | Mod: PBBFAC,,, | Performed by: PSYCHIATRY & NEUROLOGY

## 2017-12-22 PROCEDURE — 99213 OFFICE O/P EST LOW 20 MIN: CPT | Mod: PBBFAC | Performed by: PSYCHIATRY & NEUROLOGY

## 2017-12-22 NOTE — PROGRESS NOTES
HISTORY OF PRESENT ILLNESS:  An 82-year-old established female patient presents   with her daughter, Rosy Rosado -  [employer] phone number   [phone] and Nat Brooks [phone] home, [phone] cell; to   discuss issues related to her care over the past several months.  Family is   concerned about placing the patient in an inpatient rehabilitation facility   based on the recommendations of Dr. Rubio, a Neurology resident.  There have   been several messages back and forth between Social Work and Dr. Rubio without   the benefit of me having seen the patient directly in followup.  She was last   seen in October.  Dr. Lux expresses some concerns over being shuffled in the   system and a lack of continuity amongst providers.  Her daughters expressed   similar concerns and also had made mention of their concerns about her motion   abnormalities dating back to last year.  They are concerned about their father   having been through a stroke rehab with some improvement.    I did a comprehensive chart review (I have been following the case all along),   and she has seen multiple providers.  The patient's main complaints when she   sees me have been arthritic type pain and concerns over pain and comfort.  In   July of this year, we noted she had some abnormal motions and promptly referred   her to Neurology.  It is our assumption that that workup was in full progress.    She has had several neurologic visits and those have included referrals to   neuropsychology for evaluation and most recently to Stroke Neurology.  In the   summarizing their notes, there is a consideration of a CVA in the mid brain   causing hemichorea.  The recent treatments have reduced patient's involuntary   motions over the past couple of weeks.  The patient has been noted to be   somewhat drowsy and having some memory and cognitive deficits.  We had addressed   these previously in discussions in July and August and noted that  the patient   had been referred to Dr. Lagos.  They state that they not that they had not had   any followups that they felt that they were promised.      When I questioned the patient today, her primary concerns are of aging and   having a deteriorating status.  Her  and daughter's concerns today are   that of lack of attention to a possible CVA and rehabilitation and desiring a   forbore press to evaluate and treat if possible.    I had an extensive discussion earlier in the week with our 2 clinical social   workers.  The family conversation they had centered on several steps, the first   being to see the Stroke Neurologist this Friday.  Should it be a consideration   for inpatient rehabilitation based on Stroke Neurology's assessment, the family   would be inclined to choose a facility, which would necessitate an order for   consult to an inpatient facility.  Other measures would include a family friend   stay will stay with the patient in the home and a last resort nursing home.    Other concerns they expressed were over patient's Valium for motion and the   patient was eating a bit better.  I reviewed MsMolly Ana Laura and Ms. Baez's notes   and we will await the Stroke Neurology Team's  assessment and move forward   after that.    Additionally, reviewed several scans and other aspects of patient's care.  She   had an MRI that revealed lesions noted.  The MRI was quoted as saying an old CVA   lesion, no acute lesions.    I reviewed Dr. Lagos's encounters on 09/08/2017 and 09/17/2017.  I reviewed my   encounters on 07/28/2017 and 08/03/2017 noting several referrals we had made for   several of the patient's conditions.  We also noted the patient was referred to   Dr. Corea with some lost to followup.  The patient's MRI was dated   08/29/2017.  At one instance, patient had asked about an MRI that was done 2015;   however, that was done out of system and no records are available for   comparison.      The patient is experiencing neck and shoulder pain at this time, that seems to   be one of her primary concerns.  She had seen Rheumatology.  She has also had   gastrointestinal issues and had been referred to the GI Service.    In summarizing this hour and a half encounter with the patient and daughters,   they are seeking, and I concur, single point source for the patient's care   coordination.  It makes sense that would be primary care in this instance.  In   looking at the multiple consultant visits and explaining our communication that   work through our EMR, it would make sense to coordinate as such.  I explained   that I will keep my eyes open for her stroke assessment and see what transpires   from there.  If she is recommended for inpatient rehabilitation.  I explained   that the insurance guidelines make it virtually impossible to pose such a   consult.  However, I explained to them that we had done on one occasion in the   past and we could consider trying to move forward should that be the most   appropriate direction.  They were under the understanding from Neurology   resident and speech therapist that the patient may benefit from such intensive   treatment.    In looking at her overall situation, she was a patient of Dr. Hernandes.  She was   on antihypertensive medication, but had normal cholesterol and was not on any   cholesterol medications.  As a matter of fact, her last lipid panel was quite   excellent.  That said,  considerations, given any vascular etiology that stroke   neurologist may entertain, would be addition of a statin and/or low-dose   aspirin.  One reason of concern with aspirin and anti-inflammatories has been   the patient's ongoing gastropathy.    I will proceed additionally with a carotid ultrasound and a Doppler color flow   echo.  The patient has no history of rhythm disturbance, no symptoms to suggest   rhythm disturbance and no physical exam findings.    I plan to follow  her up next week to go over everything in the interval and will   maintain close contact with our Case Management Services.      SELENA/HN  dd: 12/21/2017 10:02:16 (CST)  td: 12/22/2017 00:25:38 (CST)  Doc ID   #[id number]  Job ID #[id number]    CC:

## 2017-12-22 NOTE — LETTER
December 27, 2017      Bhargav Lee MD  [address]  [place] [address]           [facility]  [address]  [place] [address]  Phone: [phone]          Patient: Selma Rosado MD Jose J   MR Number: [id number]   YOB: 1935   Date of Visit: 12/22/2017       Dear Dr. Bhargav Lee:    Thank you for referring Selma Lux to me for evaluation. Attached you will find relevant portions of my assessment and plan of care.    If you have questions, please do not hesitate to call me. I look forward to following Selma Lux along with you.    Sincerely,    Janis Flores  CC:  No Recipients    If you would like to receive this communication electronically, please contact [email or web address] or [phone] to request more information on [facility] access.    For providers and/or their staff who would like to refer a patient to [facility], please contact us through our one-stop-shop provider referral line, [facility], at [phone].    If you feel you have received this communication in error or would no longer like to receive these types of communications, please e-mail [email or web address]

## 2017-12-22 NOTE — PROGRESS NOTES
Subjective:       Patient ID: Selma Rosado MD Jose J is a 82 y.o. female.    Chief Complaint: Follow-up    HPI  Review of Systems   Constitutional: Positive for activity change, appetite change and fatigue. Negative for chills and fever.   HENT: Negative for congestion and trouble swallowing.    Eyes: Negative for redness.   Respiratory: Negative for cough, chest tightness and shortness of breath.    Cardiovascular: Negative for chest pain, palpitations and leg swelling.   Gastrointestinal: Negative for abdominal pain and blood in stool.   Genitourinary: Negative for hematuria and menstrual problem.   Musculoskeletal: Positive for arthralgias, back pain, gait problem, joint swelling, myalgias, neck pain and neck stiffness.   Skin: Negative for color change and rash.   Neurological: Positive for weakness and light-headedness. Negative for tremors, speech difficulty, numbness and headaches.   Hematological: Negative for adenopathy. Does not bruise/bleed easily.   Psychiatric/Behavioral: Positive for confusion, decreased concentration, dysphoric mood and sleep disturbance. Negative for behavioral problems. The patient is not nervous/anxious.        Objective:      Physical Exam   Constitutional: She is oriented to person, place, and time. She appears well-developed and well-nourished. No distress.   Neck: Neck supple.   Pulmonary/Chest: Effort normal.   Musculoskeletal: She exhibits no edema.        Right lower leg: She exhibits no edema.        Left lower leg: She exhibits no edema.   Neurological: She is alert and oriented to person, place, and time. She displays tremor. She exhibits abnormal muscle tone. Coordination and gait abnormal. GCS eye subscore is 4. GCS verbal subscore is 5. GCS motor subscore is 6.   Skin: Skin is warm and dry. No rash noted.   Psychiatric: Her mood appears not anxious. She is agitated. She exhibits a depressed mood.   Nursing note and vitals reviewed.      Assessment:       1.  Cerebrovascular accident (CVA), unspecified mechanism    2. Cerebral atherosclerosis     3. Abnormality of gait and mobility    4. Chronic bilateral low back pain without sciatica    5. Chronic pain syndrome    6. Cognitive impairment    7. Hemichorea    8. Hypertension, essential    9. Irritable bowel syndrome with both constipation and diarrhea    10. Impaired functional mobility, balance, gait, and endurance    11. Mild cognitive impairment    12. Mood disorder    13. Multinodular goiter    14. Osteoarthritis of both hips, unspecified osteoarthritis type    15. Primary osteoarthritis involving multiple joints    16. PUD (peptic ulcer disease)        Plan:   Selma was seen today for follow-up.    Diagnoses and all orders for this visit:    Cerebrovascular accident (CVA), unspecified mechanism  -     2D echo with color flow doppler; Future  -     CAR Ultrasound doppler carotid bliateral; Future    Cerebral atherosclerosis   -     CAR Ultrasound doppler carotid bliateral; Future    Abnormality of gait and mobility    Chronic bilateral low back pain without sciatica    Chronic pain syndrome    Cognitive impairment    Hemichorea    Hypertension, essential    Irritable bowel syndrome with both constipation and diarrhea    Impaired functional mobility, balance, gait, and endurance    Mild cognitive impairment    Mood disorder    Multinodular goiter    Osteoarthritis of both hips, unspecified osteoarthritis type    Primary osteoarthritis involving multiple joints    PUD (peptic ulcer disease)      See meds, orders, follow up, routing and instructions sections of encounter.  Dictation #1  MRN:[id number]  CSN:[id number]    1.5 hour appointment time including >50% in couselling

## 2017-12-27 ENCOUNTER — CLINICAL SUPPORT (OUTPATIENT)
Dept: REHABILITATION | Facility: HOSPITAL | Age: 82
End: 2017-12-27
Attending: PHYSICAL MEDICINE & REHABILITATION
Payer: MEDICARE

## 2017-12-27 DIAGNOSIS — R47.1 DYSARTHRIA: ICD-10-CM

## 2017-12-27 DIAGNOSIS — R41.841 COGNITIVE COMMUNICATION DEFICIT: ICD-10-CM

## 2017-12-27 DIAGNOSIS — Z74.09 IMPAIRED FUNCTIONAL MOBILITY, BALANCE, GAIT, AND ENDURANCE: ICD-10-CM

## 2017-12-27 PROCEDURE — 97110 THERAPEUTIC EXERCISES: CPT | Mod: PO

## 2017-12-27 PROCEDURE — 97532 *HC SP COG SKL DEV EA 15 MIN: CPT | Mod: PO

## 2017-12-27 PROCEDURE — 97112 NEUROMUSCULAR REEDUCATION: CPT | Mod: PO

## 2017-12-27 NOTE — PROGRESS NOTES
"                                                    Physical Therapy Progress Note     Name: Selma Alonzo Lux MD  Clinic Number: [id number]  Diagnosis:   Encounter Diagnosis   Name Primary?    Impaired functional mobility, balance, gait, and endurance      Physician: Dee Thomson, *  Treatment Orders: PT Eval and Treat  Past Medical History:   Diagnosis Date    Anemia     Arthritis     Hashimoto's disease     Hypertension     IGT (impaired glucose tolerance) 1/12/2016    Joint pain     Multinodular goiter 1/12/2016     Re-Evaluation Date: 12/12/17  Visit #: 5  Plan of care expiration: 12/5/2017  Extended POC Expiration: 02/06/17  Precautions: universal, visual impairment, impaired memory      G codes 4/10              Subjective   Pt reports: that she had a good [place]. However, she reports that she feels that she is crooked .   Pain Scale:  denies    Objective     Pt does present with increased L lateral lean when standing.     Patient received individual therapy with activities as follows:     Therapeutic exercises to increase strength and endurance x  30 min including;      X 8 min on [employer] recumbent bike.  Level 1.0 for improved CV endurance.     2 X 10, c/ 5" hold seated scap retractions  2 x 10 c/ 3" hold seated chin tucks  2 X 10 seated thoracic extension, c/ 3" hold  2 x 10 each LE, seated hip flexion c/ 2.0# ankle weights; constant VCs throughout  2 x 10 BLE LAQs c/ 2.0# ankle cuff weights    X 10 each LE, forward step up onto 4" step with alternate LE tapping/weight shifting onto black dot placed in front of step, BUE support, CGA, VCs/TCs for improved upright posture throughout      Gait: Pt ambulated  ~ 170ft in/out of gym with SC and CGA/close SBA.  Pt with lateral steps out, but no loss of balance. Increased time to complete    Patient participated in neuromuscular re-education activities to improve: Balance, Coordination and Posture for 15 minutes. The following activities were " included:     X 10 forward steps c/ each LE over half foam roll to promote improved BLE clearance, improved heel contact on IC, and improved weight shifting during gait for improved balance, BUE support, CGA; VCs/TCs for improved upright posture throughout  X 10 side steps c/ each LE over half foam roll for improved balance and clearance over obstacles; BUE support, CGA throughout; VCs/ TCs throughout for improved upright posture      Written Home Exercises: Continue HEP   Supine Quad sets, SLR, bridges, hip abd/add, Heel slides  Pt demo good understanding of the education provided. Selma demonstrated good return demonstration of activities.     Education provided re: POC, HEP  No spiritual or educational barriers to learning provided    Pt has no cultural, educational or language barriers to learning provided.    Assessment   Selma tolerated therapy session well this morning despite c/o RLE stiffness. Pt's gait appeared more steady compared to last therapy session, however, pt continues to remain more unsteady during ambulation compared to re-evaluation. Additionally, pt demonstrates increased L lateral lean during standing and ambulation. Therapy session continued to focus on addressing postural deficits and BLE weakness. Pt continued to require constant VCs and TCS throughout each activity to perform activity correctly due to decreased carry over. Cont POC to address remaining mobility deficits.     Pt rehab potential is Good. Pt will benefit from continuing skilled outpatient physical therapy to address the deficits listed below in the problem list, provide pt/family education and to maximize pt's level of independence in the home and community environment.      History  Co-morbidities and personal factors that may impact the plan of care Examination  Body Structures and Functions, activity limitations and participation restrictions that may impact the plan of care. Medical necessity is demonstrated by the  following impairments. Clinical Presentation Decision Making/ Complexity Score   1. OA of multiple joints  2. LBP  3. Visual deficits  4. Cognitive deficits  5. B rotator cuff repair  6. Vascular dementia 1. Decreased balance  2. Gait deficits  3. Increased fall risk  4. Decreased LE strength  5. Impaired endurance  6. Postural deficits evolving-unpredictable symptom presentation, cognitive deficits     high high moderate moderate         Pt's spiritual, cultural and educational needs considered and pt agreeable to plan of care and goals as stated below:      GOALS:   Short term goals: 4 weeks, pt agrees to goals set.  7. Pt to report performing HEP daily to increased IND.  8. Pt to demonstrate nadiya hip flexion MMT score to 4/5 to improve functional mobility.   9. Pt to demonstrate L hip abduction MMT score to 4/5 to improve functional mobility.   10. Pt to improve L knee extension MMT score to 4/5 to improve functional mobility  11. Pt to improve L knee flexion MMT score to 4/5 to improve functional mobility  12. Pt to improve chair rise score to at least 8 with no hands for improved endurance with functional mobility  13. Pt to improve MCTSIB condition 4 score to at least 15 seconds for improved balance and decreased fall risk.   14. Pt to improve TUG score with SPC to no more than 16 seconds for improved safety with ambulation  15. Pt to improve SSWS to at least 0.75 m/sec for improved safety with community ambulation.     Long term goals: 8 weeks, pt agrees to goals set  11. Pt to demonstrate more upright standing/seated head/shoulder posture to improve balance.  12. Pt to improve SSWS to at least 0.86m/sec for improved safety with community ambulation.   13. Pt to demonstrate nadiya hip flexion strength of 4+/5 to improve functional mobility.  14. Pt to demonstrate L hip abduction strength to 4+/5 to improve functional mobility.  16. Pt to improve L knee extension MMT score to 4/+5 to improve functional  mobility  17. Pt to improve L knee flexion MMT score to 4+/5 to improve functional mobility  18. Pt to improve chair rise score to at least 9 with no hands for improved endurance with functional mobility  19. Pt to improve MCTSIB condition 4 score to at least 20 seconds for improved balance and decreased fall risk.   20. Pt to improve TUG score with SPC to no more than 14 seconds for improved safety with ambulation  21. Pt to improve FOTO score to at least 60% for improved self concept with functional mobility.     Plan   Outpatient physical therapy 2 times weekly to include: Pt Education, HEP, therapeutic exercises, neuromuscular re-education, therapeutic activities, manual therapy, joint mobilizations, aquatic therapy and modalities PRN to achieve established goals. Pt may be seen by PTA as part of the rehabilitation team.      Cont PT for  8 weeks.     Kayleen Bañuelos, PT   12/27/2017

## 2017-12-27 NOTE — PROGRESS NOTES
OUTPATIENT NEUROLOGICAL REHABILITATION  SPEECH THERAPY PROGRESS NOTE    Date:  12/27/2017     Start Time:  1000  Stop Time:  1030    Subjective/History  Onset Date:  Approximately 1 to 2 years ago  Primary Diagnosis:  Cognitive Impairment, Hemichorea, Debility  Treatment Diagnosis:    1. Cognitive communication deficit      2. Dysarthria         Referring Provider:  Dr. 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     Past Medical History:   Past Medical History:   Diagnosis Date    Anemia     Arthritis     Hashimoto's disease     Hypertension     IGT (impaired glucose tolerance) 1/12/2016    Joint pain     Multinodular goiter 1/12/2016     Precautions:  Memory loss        TIMED  Procedure Time Min.    Cognitive therapy Start:  1000  Stop:  1030   30         UNTIMED  Procedure Time Min.     Start:    Stop:          Start:   Stop:        Total Minutes: 30  Total Timed Units: 2  Total Untimed Units: 0  Charges Billed/# of units: 2    Visit #: 3  Date of Evaluation:  12/19/17  Plan of Care Expiration:    02/16/18  Extended POC:    G-CODE   3/10    eval   CK Memory   update          Progress/Current Status    Subjective:     Pain: 0 /10  Pt commented that she becomes overwhelmed when given told what to do and when given too many directions.     Objective:     Short Term Goals: (6 weeks) Current Progress:   1. Dr. Lux will complete short term memory tasks (immediate) with 90% acc given min cues over 3 consecutive sessions.    - Pt recalled details from a paragraph read to her with 80% acc IND'ly     2. Dr. Lux will complete short term memory tasks (delayed) with 90% acc given min cues over 3 consecutive sessions. -  Pt recalled details from a paragraph read to her with  80% acc IND'ly after a delay   3. Dr. Lux will complete mental flexibility/manipulation tasks with 90% acc given min cues over 3 consecutive sessions. -  Pt unscrambled sentences (3 words) with 80% acc IND'ly; 4 words with 100% acc.INstructions were repeated.   4. Dr. Lux will problem solving, reasoning and thought organization tasks with with 90% acc given min cues over 3 consecutive sessions. - Pt completed word deduction task with 80% acc IND'ly, 90% acc given a cue to elaborate her response.   5. Dr. Lux will list 15 to 20 items in concrete categories within one minute over 3 consecutive sessions. - Not formally addressed    6. Dr. Lux will utilize motor speech strategies (loud, over articulated speech, good breath support) in speech tasks with 90% accuracy given min cues over 3 consecutive sessions. - Pt's speech was 100% intelligible today. Goal met x 2         Long Term Goals (8 weeks):   1. Dr. Lux will use appropriate memory strategies to schedule and recall weekly activities, express needs and recall names to maintain safety and participate socially in functional living environment.   2. Dr. Lux will demonstrate functional problem solving skills and provide appropriate solutions to problems in order to improve safety and awareness in functional living environment.   3. Dr. Lux will demonstrate use of (self awareness, goal setting, planning, initiation, self-monitoring) during daily living activities to improve safety and awareness in functional living environment.  4. Dr. Lux will develop functional and intelligible speech and utilize compensatory strategies through the use of adequate labial and lingual function, increased articulatory precision and speech prosody.  5. Dr. Lux will demonstrate understanding and use of compensatory strategies to improve functional independence.    Assessment:   Pt reported that she knows her deficits. Repetition and further explanation was needed for her  to complete some of the tasks.  Current goals remain appropriate. Goals to be updated as necessary.      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 She presents with impaired short term memory, word finding, problem solving, thought organization, executive function, and mental flexibility skills. Pt would benefit from continued skilled ST. Prognosis for improvement remains Good     Patient Education/Response:   Pt educated on ST role and therapy tasks. She verbalized understanding.     Home Program: n/a    Plans and Goals:     Continue POC        Other Recommendations: 1. OT consult  2. Eye Exam     XUAN Gonzalez, CCC-SLP  Speech-Language Pathologist  [facility]  Neurological Rehabilitation

## 2017-12-28 PROBLEM — I67.9 SMALL VESSEL DISEASE, CEREBROVASCULAR: Status: ACTIVE | Noted: 2017-12-28

## 2017-12-28 NOTE — ASSESSMENT & PLAN NOTE
Patient has vascular dementia secondary to multiple stroke and chronic small vessel disease. Polypharmacology and  Depression seem to be contributing factors. Needs complete assessment of medications by Dr. Lee so all non-essential medications are eliminated and all future meds go through him for approval.

## 2017-12-28 NOTE — ASSESSMENT & PLAN NOTE
Patient has chronic cerebral small vessel disease which has caused multiple strokes and vascular dementia.

## 2017-12-28 NOTE — PROGRESS NOTES
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 Patient is more withdrawn and has lost interest in doing many things she previously enjoyed. She seems confused at times and has slowed down motoricaly.Daughter states over last year she has had mild cognitive impairment. Syptoms seem subacute rather than gradual over last 18 months.    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 She is followed by Dr. Giang.      Review of Systems  Review of Systems   Neurological: Positive for dizziness and light-headedness.   Psychiatric/Behavioral: Positive for confusion.       Objective:      Neurologic Exam     Mental Status   Oriented to person, place, and time.   Oriented to time.   Registration: recalls 3 of 3 objects. Recall at 5 minutes: recalls 2 of 3 objects. Follows 3 step commands.   Attention: normal. Concentration: normal.   Speech: speech is normal   Level of consciousness: alert  Knowledge: good. Able to perform simple calculations.   Able to name object. Able to read. Able to repeat. Able to write. Normal comprehension.     Cranial Nerves   Cranial nerves II through XII intact.     CN III, IV, VI   Pupils are equal, round, and reactive to light.  Extraocular motions are normal.     Motor Exam   Muscle bulk: normal  Overall muscle tone: normal  Right arm tone: normal  Right arm pronator drift: absent  Left arm pronator drift: absent  Right leg tone: normal  Left leg tone: normal    Strength   Right neck flexion: 4/5  Left neck flexion: 4/5  Right neck extension: 4/5  Left neck extension: 4/5  Right deltoid: 4/5  Left deltoid: 4/5  Right biceps: 4/5  Left biceps: 4/5  Right triceps: 4/5  Left triceps: 4/5  Right wrist flexion: 4/5  Left wrist flexion: 4/5  Right wrist extension: 4/5  Left " wrist extension: 4/5  Right interossei: 4/5  Left interossei: 4/5  Right abdominals: 4/5  Left abdominals: 4/5  Right iliopsoas: 4/5  Left iliopsoas: 4/5  Right quadriceps: 4/5  Left quadriceps: 4/5  Right hamstrin/5  Left hamstrin/5  Right glutei: 4/5  Left glutei: 4/5  Right anterior tibial: 4/5  Left anterior tibial: 4/5  Right posterior tibial: 4/5  Left posterior tibial: 4/5  Right peroneal: 4/5  Left peroneal: 4/5  Right gastroc: 4/5  Left gastroc: 4/5    Sensory Exam   Light touch normal.   Vibration normal.   Pinprick normal.     Gait, Coordination, and Reflexes     Gait  Gait: normal    Coordination   Romberg: negative  Finger to nose coordination: normal  Heel to shin coordination: normal    Tremor   Resting tremor: absent  Intention tremor: absent  Action tremor: absent    Reflexes   Reflexes 2+ except as noted.       Physical Exam   Constitutional: She is oriented to person, place, and time. She appears well-developed and well-nourished.   HENT:   Head: Normocephalic and atraumatic.   Eyes: EOM are normal. Pupils are equal, round, and reactive to light.   Neck: Normal range of motion. Neck supple.   Cardiovascular: Normal rate, regular rhythm and normal heart sounds.    Pulmonary/Chest: Effort normal and breath sounds normal.   Musculoskeletal: Normal range of motion.   Neurological: She is alert and oriented to person, place, and time. She has a normal Finger-Nose-Finger Test, a normal Heel to Lee Test and a normal Romberg Test. Gait normal.   Skin: Skin is warm and dry.   Psychiatric: Her speech is normal.   Vitals reviewed.        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         Cognitive impairment     Component of vascular dementia         Vascular dementia     Patient has vascular dementia secondary to multiple stroke and chronic small vessel disease. Polypharmacology and  Depression seem to be  contributing factors. Needs complete assessment of medications by Dr. Lee so all non-essential medications are eliminated and all future meds go through him for approval.         Small vessel disease, cerebrovascular     Patient has chronic cerebral small vessel disease which has caused multiple strokes and vascular dementia.                  Plan:     Recommend starting Brilinta 75 mg po bid  if no contraindication.  Neuropsych testing if not done previously.  Kirt after psychiatric eval

## 2017-12-29 ENCOUNTER — TELEPHONE (OUTPATIENT)
Dept: GASTROENTEROLOGY | Facility: CLINIC | Age: 82
End: 2017-12-29

## 2017-12-29 ENCOUNTER — OUTPATIENT CASE MANAGEMENT (OUTPATIENT)
Dept: ADMINISTRATIVE | Facility: OTHER | Age: 82
End: 2017-12-29

## 2017-12-29 ENCOUNTER — OFFICE VISIT (OUTPATIENT)
Dept: INTERNAL MEDICINE | Facility: CLINIC | Age: 82
End: 2017-12-29
Attending: FAMILY MEDICINE
Payer: MEDICARE

## 2017-12-29 ENCOUNTER — CLINICAL SUPPORT (OUTPATIENT)
Dept: REHABILITATION | Facility: HOSPITAL | Age: 82
End: 2017-12-29
Attending: PHYSICAL MEDICINE & REHABILITATION
Payer: MEDICARE

## 2017-12-29 VITALS
DIASTOLIC BLOOD PRESSURE: 64 MMHG | OXYGEN SATURATION: 98 % | SYSTOLIC BLOOD PRESSURE: 126 MMHG | WEIGHT: 157 LBS | BODY MASS INDEX: 26.16 KG/M2 | HEART RATE: 88 BPM | HEIGHT: 65 IN

## 2017-12-29 DIAGNOSIS — R47.1 DYSARTHRIA: ICD-10-CM

## 2017-12-29 DIAGNOSIS — G25.5 HEMICHOREA: ICD-10-CM

## 2017-12-29 DIAGNOSIS — F01.50 VASCULAR DEMENTIA WITHOUT BEHAVIORAL DISTURBANCE: ICD-10-CM

## 2017-12-29 DIAGNOSIS — Z74.09 IMPAIRED FUNCTIONAL MOBILITY, BALANCE, GAIT, AND ENDURANCE: ICD-10-CM

## 2017-12-29 DIAGNOSIS — I63.9 CEREBROVASCULAR ACCIDENT (CVA), UNSPECIFIED MECHANISM: Primary | ICD-10-CM

## 2017-12-29 DIAGNOSIS — R41.89 COGNITIVE IMPAIRMENT: ICD-10-CM

## 2017-12-29 DIAGNOSIS — R41.841 COGNITIVE COMMUNICATION DEFICIT: ICD-10-CM

## 2017-12-29 DIAGNOSIS — I10 HYPERTENSION, ESSENTIAL: ICD-10-CM

## 2017-12-29 DIAGNOSIS — K58.9 IRRITABLE BOWEL SYNDROME, UNSPECIFIED TYPE: ICD-10-CM

## 2017-12-29 PROCEDURE — 99214 OFFICE O/P EST MOD 30 MIN: CPT | Mod: S$PBB,,, | Performed by: FAMILY MEDICINE

## 2017-12-29 PROCEDURE — 97532 *HC SP COG SKL DEV EA 15 MIN: CPT | Mod: PO

## 2017-12-29 PROCEDURE — 99214 OFFICE O/P EST MOD 30 MIN: CPT | Mod: PBBFAC | Performed by: FAMILY MEDICINE

## 2017-12-29 PROCEDURE — 99999 PR PBB SHADOW E&M-EST. PATIENT-LVL IV: CPT | Mod: PBBFAC,,, | Performed by: FAMILY MEDICINE

## 2017-12-29 PROCEDURE — 97110 THERAPEUTIC EXERCISES: CPT | Mod: PO

## 2017-12-29 RX ORDER — SERTRALINE HYDROCHLORIDE 25 MG/1
25 TABLET, FILM COATED ORAL DAILY
Qty: 30 TABLET | Refills: 11 | Status: SHIPPED | OUTPATIENT
Start: 2017-12-29 | End: 2018-02-05 | Stop reason: SDUPTHER

## 2017-12-29 RX ORDER — ASPIRIN 81 MG/1
81 TABLET ORAL DAILY
Qty: 360 TABLET | Refills: 3 | Status: SHIPPED | OUTPATIENT
Start: 2017-12-29 | End: 2018-01-24

## 2017-12-29 NOTE — PROGRESS NOTES
OUTPATIENT NEUROLOGICAL REHABILITATION  SPEECH THERAPY PROGRESS NOTE    Date:  12/29/2017     Start Time:  1040  Stop Time:  1120    Subjective/History  Onset Date:  Approximately 1 to 2 years ago  Primary Diagnosis:  Cognitive Impairment, Hemichorea, Debility  Treatment Diagnosis:    1. Cognitive communication deficit      2. Dysarthria         Referring Provider:  Dr. Baldomero Giang  Orders: ST for evaluation and treat  Current Medical History:  Molly Jose J presents to the [facility] with moderate cognitive-communicative impairment secondary to the diagnosis of cognitive impairment, hemichorea, and debility.     Past Medical History:   Past Medical History:   Diagnosis Date    Anemia     Arthritis     Hashimoto's disease     Hypertension     IGT (impaired glucose tolerance) 1/12/2016    Joint pain     Multinodular goiter 1/12/2016     Precautions:  Memory loss        TIMED  Procedure Time Min.    Cognitive therapy Start:  1040  Stop:  1120   40         UNTIMED  Procedure Time Min.     Start:    Stop:          Start:   Stop:        Total Minutes: 40  Total Timed Units: 2  Total Untimed Units: 0  Charges Billed/# of units: 2    Visit #: 4  Date of Evaluation:  12/19/17  Plan of Care Expiration:    02/16/18  Extended POC:    G-CODE   4/10    eval   CK Memory   update          Progress/Current Status    Subjective:     Pain: 0 /10  Pt's daughter reported that she engaged in social activities since the last session (she went to lunch and tea).     Objective:     Short Term Goals: (6 weeks) Current Progress:   1. Dr. Lux will complete short term memory tasks (immediate) with 90% acc given min cues over 3 consecutive sessions.    - Not formally addressed   - directions needed to be repeated especially when she lost concentration and when she lost track of what she was doing.    2. Dr. Lux will complete short term memory tasks (delayed) with 90% acc given min cues  over 3 consecutive sessions. -  Not formally addressed      3. Dr. Lux will complete mental flexibility/manipulation tasks with 90% acc given min cues over 3 consecutive sessions. -  Constant Therapy: Alternating Word Order (uppercase and lower case letters): L 1 - 92% acc given min cues only when she lost track of what she was doing. She IND It took her 35 minutes to complete 5/10 trials.    4. Dr. Lux will problem solving, reasoning and thought organization tasks with with 90% acc given min cues over 3 consecutive sessions. - Constant Therapy: Alternating Word Order (uppercase and lower case letters): L 1 - 92% acc given min cues only when she lost track of what she was doing. It took her 35 minutes to complete 5/10 trials. She needed cues to scan from left to right to find all of the words.    5. Dr. Lux will list 15 to 20 items in concrete categories within one minute over 3 consecutive sessions. - Not formally addressed    6. Dr. Lux will utilize motor speech strategies (loud, over articulated speech, good breath support) in speech tasks with 90% accuracy given min cues over 3 consecutive sessions. - Pt's speech was 100% intelligible today. Goal met x 3         Long Term Goals (8 weeks):   1. Dr. Lux will use appropriate memory strategies to schedule and recall weekly activities, express needs and recall names to maintain safety and participate socially in functional living environment.   2. Dr. Lux will demonstrate functional problem solving skills and provide appropriate solutions to problems in order to improve safety and awareness in functional living environment.   3. Dr. Lux will demonstrate use of (self awareness, goal setting, planning, initiation, self-monitoring) during daily living activities to improve safety and awareness in functional living environment.  4. Dr. Lux will develop functional and intelligible speech and utilize compensatory strategies through the use of adequate  labial and lingual function, increased articulatory precision and speech prosody.  5. Dr. Lux will demonstrate understanding and use of compensatory strategies to improve functional independence.    Assessment:   Decreased processing time and she loses concentration during tasks.   Current goals remain appropriate. Goals to be updated as necessary.      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 She presents with impaired short term memory, word finding, problem solving, thought organization, executive function, and mental flexibility skills. Pt would benefit from continued skilled ST. Prognosis for improvement remains Good     Patient Education/Response:   Pt educated on utilizing strategies to scan from left to right. She verbalized understanding.     Home Program: n/a    Plans and Goals:     Continue POC        Other Recommendations: 1. OT consult  2. Eye Exam     XUAN Gonzalez, CCC-SLP  Speech-Language Pathologist  [facility]  Neurological Rehabilitation

## 2017-12-29 NOTE — PROGRESS NOTES
12/29/17  Chart reviewed.   CM f/u to update plan of care. CM spoke by phone with daughter, Rosy Rosado TEL:  [phone]. S/p Neuro appt 12/22 with Dr. Tobias. Rosy reports the visit was very good. Pt dxd with multiple mini strokes, vascular dementia. Inpt Rehab was not ordered at this juncture. Prescribed Brilinta 75 mg BID PO (to reduce risk of strokes), recommended NeuroPsych testing if not already done and Namzaric after Psych eval (for Alzheimers/Dementia). Pt has f/u appt with Dr. Lee, PCP this PM for further med review and elimination as appropriate and treatment plan. Rosy states her sister, Dr. Lomas returned home by 12/25. Rosy tentatively leaving by 1/10/18. A friend has been recruited to stay with pt for 24hr supervision/care until Rosy can return. Pt continues with OP PT/ST.    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

## 2017-12-29 NOTE — TELEPHONE ENCOUNTER
----- Message from Sheron Engle sent at 12/29/2017  4:12 PM CST -----  Pt Dr. Lux would like to be scheduled for a follow up appt. I was unable to do so. Please call pt at [phone] to schedule.    Thank you

## 2017-12-29 NOTE — PROGRESS NOTES
Subjective:       Patient ID: Selma Rosado MD Jose J is a 82 y.o. female.    Chief Complaint: Follow-up    HPI  Review of Systems   Constitutional: Negative for chills, fatigue and fever.   HENT: Negative for congestion and trouble swallowing.    Eyes: Negative for redness.   Respiratory: Negative for cough, chest tightness and shortness of breath.    Cardiovascular: Negative for chest pain, palpitations and leg swelling.   Gastrointestinal: Negative for abdominal pain and blood in stool.   Genitourinary: Negative for hematuria and menstrual problem.   Musculoskeletal: Negative for arthralgias, back pain, gait problem, joint swelling, myalgias and neck pain.   Skin: Negative for color change and rash.   Neurological: Positive for tremors and weakness. Negative for speech difficulty, numbness and headaches.   Hematological: Negative for adenopathy. Does not bruise/bleed easily.   Psychiatric/Behavioral: Positive for decreased concentration. Negative for behavioral problems, confusion and sleep disturbance. The patient is not nervous/anxious.        Objective:      Physical Exam   Constitutional: She is oriented to person, place, and time. She appears well-developed and well-nourished. No distress.   Neck: Neck supple.   Pulmonary/Chest: Effort normal.   Musculoskeletal: She exhibits no edema.        Right lower leg: She exhibits no edema.        Left lower leg: She exhibits no edema.   Neurological: She is alert and oriented to person, place, and time.   Skin: Skin is warm and dry. No rash noted.   Psychiatric: She has a normal mood and affect. Her behavior is normal. Judgment and thought content normal.   Nursing note and vitals reviewed.      Assessment:       1. Cerebrovascular accident (CVA), unspecified mechanism    2. Irritable bowel syndrome, unspecified type    3. Cognitive impairment    4. Hemichorea    5. Hypertension, essential    6. Impaired functional mobility, balance, gait, and endurance    7.  Vascular dementia without behavioral disturbance        Plan:   Selma was seen today for follow-up.    Diagnoses and all orders for this visit:    Cerebrovascular accident (CVA), unspecified mechanism    Irritable bowel syndrome, unspecified type  -     Ambulatory referral to Gastroenterology    Cognitive impairment    Hemichorea    Hypertension, essential    Impaired functional mobility, balance, gait, and endurance    Vascular dementia without behavioral disturbance    Other orders  -     aspirin (ECOTRIN) 81 MG EC tablet; Take 1 tablet (81 mg total) by mouth once daily.  -     sertraline (ZOLOFT) 25 MG tablet; Take 1 tablet (25 mg total) by mouth once daily.      See meds, orders, follow up, routing and instructions sections of encounter.  The patient is sent for followup from Dr. Tobias.  I reviewed Dr. Tobias's   notes, also several of her recent notes.  The patient has an echocardiogram and   ultrasound of carotids pending. Dr. Tobias's intent was to have me look through   her medications and eliminate those that were not necessary, that could   potentially be causing sedation, etc.  The patient has run out of Valium.  We   will stop that.  Additionally, she is off of Neurontin, baclofen and Linzess,   currently.    Dr. Tobias suggested Brilinta or an antiplatelet.  We will begin with aspirin   and send a query concerning how Dr. Tobias wants to proceed.  Typically I   explained to the patient and daughter, we will let the antiplatelet or any   coagulation decisions go to tertiary care given high level side effects   potentially with those.    At the current time, it does appears that her daughter is taking good care of   her at home.  daughter has normalized her breakfast, medications, caffeine,   decreased her sugar intake and she did have a good PT and speech therapy session   today.    She has a pending Psychiatric consult and another one of Dr. Tobias's request   was to look into treatment for anxiety or  depression.  I think it is reasonable   to try Zoloft low-dose and titrate.  We will begin that, see the patient back in   approximately six weeks.      SELENA/HN  dd: 12/29/2017 18:38:48 (CST)  td: 12/30/2017 03:37:29 (CST)  Doc ID   #[id number]  Job ID #[id number]    CC:

## 2017-12-29 NOTE — PROGRESS NOTES
"                                                    Physical Therapy Progress Note     Name: Selma Alonzo Lux MD  Clinic Number: [id number]  Diagnosis:   Encounter Diagnosis   Name Primary?    Impaired functional mobility, balance, gait, and endurance      Physician: Dee Thomson, *  Treatment Orders: PT Eval and Treat  Past Medical History:   Diagnosis Date    Anemia     Arthritis     Hashimoto's disease     Hypertension     IGT (impaired glucose tolerance) 1/12/2016    Joint pain     Multinodular goiter 1/12/2016     Re-Evaluation Date: 12/12/17  Visit #: 6  Plan of care expiration: 12/5/2017  Extended POC Expiration: 02/06/17  Precautions: universal, visual impairment, impaired memory      G codes 6/10              Subjective   Pt reports: that she's been trying to stand up straighter  Pain Scale:  denies    Objective     Pt does present with increased L lateral lean when standing.     Patient received individual therapy with activities as follows:     Therapeutic exercises to increase strength and endurance x  45 min including;      X 8 min on [employer] recumbent bike.  Level 3.0 for improved CV endurance.     30" x 2 B UT stretch seated  30" x 2 B levator scap strectch seated  2 X 10, c/ 5" hold seated scap retractions  x 10 c/ 3" hold seated chin tucks  2 X 10 seated thoracic extension, c/ 3" hold  3 x 10 seated hip AB c/ GTB  2 x 10 seated B hamstring curls with GTB    X 10 standing B heel raises, BUE support at // bars  X 10 standing B toes raises, BUE support at // bars    Gait: Pt ambulated  ~ 170ft in/out of gym with SC and CGA/close SBA.  Pt with lateral steps out, but no loss of balance. Increased time to complete    Written Home Exercises: Continue HEP   Supine Quad sets, SLR, bridges, hip abd/add, Heel slides  Pt demo good understanding of the education provided. Selma demonstrated good return demonstration of activities.     Education provided re: POC, HEP  No spiritual or educational " barriers to learning provided    Pt has no cultural, educational or language barriers to learning provided.    Assessment   Selma tolerated therapy session well this morning without difficulty. Pt's LLE clearance during ambulation appeared improved compared to last therapy session, however, pt continues to remain more unsteady during ambulation compared to re-evaluation due to occasional lateral stepping, forward flexed/L laterally flexed posture, and decreased safety awareness.  During seated therex, pt required constant VCs and TCs from PT for improved posture and to correct L lateral leaning. Pt also required constant VCs throughout each exercises due to decreased cognition and decreased carry over. Pt to benefit from continued PT intervention to further address remaining strength, posture, balance, and ambulation deficits.       Pt rehab potential is Good. Pt will benefit from continuing skilled outpatient physical therapy to address the deficits listed below in the problem list, provide pt/family education and to maximize pt's level of independence in the home and community environment.      History  Co-morbidities and personal factors that may impact the plan of care Examination  Body Structures and Functions, activity limitations and participation restrictions that may impact the plan of care. Medical necessity is demonstrated by the following impairments. Clinical Presentation Decision Making/ Complexity Score   1. OA of multiple joints  2. LBP  3. Visual deficits  4. Cognitive deficits  5. B rotator cuff repair  6. Vascular dementia 1. Decreased balance  2. Gait deficits  3. Increased fall risk  4. Decreased LE strength  5. Impaired endurance  6. Postural deficits evolving-unpredictable symptom presentation, cognitive deficits     high high moderate moderate         Pt's spiritual, cultural and educational needs considered and pt agreeable to plan of care and goals as stated below:      GOALS:   Short term  goals: 4 weeks, pt agrees to goals set.  7. Pt to report performing HEP daily to increased IND.  8. Pt to demonstrate nadiya hip flexion MMT score to 4/5 to improve functional mobility.   9. Pt to demonstrate L hip abduction MMT score to 4/5 to improve functional mobility.   10. Pt to improve L knee extension MMT score to 4/5 to improve functional mobility  11. Pt to improve L knee flexion MMT score to 4/5 to improve functional mobility  12. Pt to improve chair rise score to at least 8 with no hands for improved endurance with functional mobility  13. Pt to improve MCTSIB condition 4 score to at least 15 seconds for improved balance and decreased fall risk.   14. Pt to improve TUG score with SPC to no more than 16 seconds for improved safety with ambulation  15. Pt to improve SSWS to at least 0.75 m/sec for improved safety with community ambulation.     Long term goals: 8 weeks, pt agrees to goals set  11. Pt to demonstrate more upright standing/seated head/shoulder posture to improve balance.  12. Pt to improve SSWS to at least 0.86m/sec for improved safety with community ambulation.   13. Pt to demonstrate nadiya hip flexion strength of 4+/5 to improve functional mobility.  14. Pt to demonstrate L hip abduction strength to 4+/5 to improve functional mobility.  16. Pt to improve L knee extension MMT score to 4/+5 to improve functional mobility  17. Pt to improve L knee flexion MMT score to 4+/5 to improve functional mobility  18. Pt to improve chair rise score to at least 9 with no hands for improved endurance with functional mobility  19. Pt to improve MCTSIB condition 4 score to at least 20 seconds for improved balance and decreased fall risk.   20. Pt to improve TUG score with SPC to no more than 14 seconds for improved safety with ambulation  21. Pt to improve FOTO score to at least 60% for improved self concept with functional mobility.     Plan   Outpatient physical therapy 2 times weekly to include: Pt Education,  HEP, therapeutic exercises, neuromuscular re-education, therapeutic activities, manual therapy, joint mobilizations, aquatic therapy and modalities PRN to achieve established goals. Pt may be seen by PTA as part of the rehabilitation team.      Cont PT for  8 weeks.     Kayleen Bañuelos, PT   12/29/2017

## 2018-01-02 ENCOUNTER — CLINICAL SUPPORT (OUTPATIENT)
Dept: REHABILITATION | Facility: HOSPITAL | Age: 83
End: 2018-01-02
Attending: PHYSICAL MEDICINE & REHABILITATION
Payer: MEDICARE

## 2018-01-02 ENCOUNTER — DOCUMENTATION ONLY (OUTPATIENT)
Dept: INTERNAL MEDICINE | Facility: CLINIC | Age: 83
End: 2018-01-02

## 2018-01-02 ENCOUNTER — TELEPHONE (OUTPATIENT)
Dept: INTERNAL MEDICINE | Facility: CLINIC | Age: 83
End: 2018-01-02

## 2018-01-02 ENCOUNTER — OUTPATIENT CASE MANAGEMENT (OUTPATIENT)
Dept: ADMINISTRATIVE | Facility: OTHER | Age: 83
End: 2018-01-02

## 2018-01-02 DIAGNOSIS — R41.841 COGNITIVE COMMUNICATION DEFICIT: ICD-10-CM

## 2018-01-02 DIAGNOSIS — R47.1 DYSARTHRIA: ICD-10-CM

## 2018-01-02 DIAGNOSIS — Z74.09 IMPAIRED FUNCTIONAL MOBILITY, BALANCE, GAIT, AND ENDURANCE: ICD-10-CM

## 2018-01-02 PROCEDURE — 92507 TX SP LANG VOICE COMM INDIV: CPT | Mod: PO

## 2018-01-02 PROCEDURE — 97127 *HC SP COG SKL DEV EA 15 MIN: CPT | Mod: PO

## 2018-01-02 PROCEDURE — 97110 THERAPEUTIC EXERCISES: CPT | Mod: PO

## 2018-01-02 NOTE — TELEPHONE ENCOUNTER
"----- Message from Domonique Baez RN sent at 1/2/2018  9:04 AM CST -----  Contact: MARILYN Jerez Dr./Staff:    Good morning & Happy New Year !   I spoke briefly with daughter, Rosy this AM as follow-up call. Rosy believes that Inpatient Rehab is "in progress" and asks this CM to look into the status further.   How do you wish to proceed with this matter such as coordination with Neuro and Outpatient PT/ST? How do you wish for me to help?    Please advise.   Thank you,  OWEN Jerez, RN, CCM [facility]  TEL:  [phone]    "

## 2018-01-02 NOTE — PROGRESS NOTES
OUTPATIENT NEUROLOGICAL REHABILITATION  SPEECH THERAPY PROGRESS NOTE    Date:  1/2/2018     Start Time:  1120  Stop Time:  1208    Subjective/History  Onset Date:  Approximately 1 to 2 years ago  Primary Diagnosis:  Cognitive Impairment, Hemichorea, Debility  Treatment Diagnosis:    1. Cognitive communication deficit      2. Dysarthria         Referring Provider:  Dr. 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     Past Medical History:   Past Medical History:   Diagnosis Date    Anemia     Arthritis     Hashimoto's disease     Hypertension     IGT (impaired glucose tolerance) 1/12/2016    Joint pain     Multinodular goiter 1/12/2016     Precautions:  Memory loss        TIMED  Procedure Time Min.    Cognitive Therapy Start:  1120  Stop:  1208  48         UNTIMED  Procedure Time Min.     Start:    Stop:          Start:   Stop:        Total Minutes: 48  Total Timed Units: 3  Total Untimed Units: 0  Charges Billed/# of units: 3    Visit #: 5  Date of Evaluation:  12/19/17  Plan of Care Expiration:    02/16/18  Extended POC:    G-CODE   5/10    eval   CK Memory   update          Progress/Current Status    Subjective:     Pain: 7 /10  Pt complained of neck pain by the left carotid artery.  She has an MRI tomorrow for her neck.     Objective:     Short Term Goals: (6 weeks) Current Progress:   1. Dr. Lux will complete short term memory tasks (immediate) with 90% acc given min cues over 3 consecutive sessions.    - Constant Therapy - Voice Mail L 1- 100% acc   - Constant Therapy - Picture N Back Memory: L 1 - 96% acc; L 2 - 40% acc   2. Dr. Lux will complete short term memory tasks (delayed) with 90% acc given min cues over 3 consecutive sessions. -  Constant Therapy -  Picture N Back Memory: L 2 - 40% acc     3. Dr. Lux will complete mental flexibility/manipulation tasks with 90% acc given min cues over 3 consecutive sessions. -  Constant Therapy - Picture N Back Memory: L 2 - 40% acc   4. Dr. Lux will problem solving, reasoning and thought organization tasks with with 90% acc given min cues over 3 consecutive sessions. - Constant Therapy - Instruction Sequencing - 90% acc   5. Dr. Lux will list 15 to 20 items in concrete categories within one minute over 3 consecutive sessions. - Not formally addressed    6. Dr. Lux will utilize motor speech strategies (loud, over articulated speech, good breath support) in speech tasks with 90% accuracy given min cues over 3 consecutive sessions. - Pt's speech was 100% intelligible today. Goal met x 3         Long Term Goals (8 weeks):   1. Dr. Lux will use appropriate memory strategies to schedule and recall weekly activities, express needs and recall names to maintain safety and participate socially in functional living environment.   2. Dr. Lux will demonstrate functional problem solving skills and provide appropriate solutions to problems in order to improve safety and awareness in functional living environment.   3. Dr. Lux will demonstrate use of (self awareness, goal setting, planning, initiation, self-monitoring) during daily living activities to improve safety and awareness in functional living environment.  4. Dr. Lux will develop functional and intelligible speech and utilize compensatory strategies through the use of adequate labial and lingual function, increased articulatory precision and speech prosody.  5. Dr. Lux will demonstrate understanding and use of compensatory strategies to improve functional independence.    Assessment:   Decreased processing time and directions needed to be repeated.  Pt was distracted by her neck pain.  Current goals remain appropriate. Goals to be updated as necessary.      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 She presents with impaired short term memory, word finding, problem solving, thought organization, executive function, and mental flexibility skills. Pt would benefit from continued skilled ST. Prognosis for improvement remains Good     Patient Education/Response:   Strategies to concentrate.  She verbalized understanding.     Home Program: n/a    Plans and Goals:     Continue POC        Other Recommendations: 1. OT consult  2. Eye Exam     XUAN Gonzalez, CCC-SLP  Speech-Language Pathologist  [facility]  Neurological Rehabilitation

## 2018-01-02 NOTE — PROGRESS NOTES
"1/2/18  Chart reviewed. S/p PCP appt 12/29/17; start Baby Asa and Zoloft. Pending, Psych & GI appts & Carotid Studies. Pt continues with OP PT/ST- appts this AM.   CM call to daughterRosy. She confirms that pt, Dr. Lux has started on Asa 81 mg daily PO and Zoloft 25 mg daily PO. She has sent an email to Dr. Deutsch expressing her concerns over Valium and total of 3 rxs stopped at once due to increased tremors noted. This CM asks her about Inpt Rehab discussion. She states she believes it is "in progress".   CM to communicate with PCP/Neuro. [facility] offers her local number to use [phone].     Message to Dr. Deutsch/Staff:  PCP    Good morning & Happy New Year !   I spoke briefly with daughterRosy this AM as follow-up call. Rosy believes that Inpatient Rehab is "in progress" and asks this CM to look into the status further.   How do you wish to proceed with this matter such as coordination with Neuro and Outpatient PT/ST? How do you wish for me to help?    Please advise.   Thank you,  OWEN Jerez, RN, CCM [facility]  TEL:  [phone]      Plan for next encounter  F/u with response to next step in pt's rehab plans/goals per PCP  Continue pt safety teaching with 24 supervision and assist with ADLs/IADLs -management of meds daily  "

## 2018-01-02 NOTE — PROGRESS NOTES
"                                                    Physical Therapy Progress Note     Name: Selma Alonzo Lux MD  Clinic Number: [id number]  Diagnosis:   Encounter Diagnosis   Name Primary?    Impaired functional mobility, balance, gait, and endurance      Physician: Dee Thomson, *  Treatment Orders: PT Eval and Treat  Past Medical History:   Diagnosis Date    Anemia     Arthritis     Hashimoto's disease     Hypertension     IGT (impaired glucose tolerance) 1/12/2016    Joint pain     Multinodular goiter 1/12/2016     Re-Evaluation Date: 12/12/17  Total 2017 Visit #: 6  2018 Visit #: 1  Plan of care expiration: 12/5/2017  Extended POC Expiration: 02/06/17  Precautions: universal, visual impairment, impaired memory      G codes 7/10              Subjective   Pt reports: that she's doing OK this afternoon  Pain Scale:  denies    Objective     Patient received individual therapy with activities as follows:     Therapeutic exercises to increase strength and endurance x  45 min including;      X 8 min on [employer] recumbent bike.  Level 4.0 for improved CV endurance.     30" x 2 B UT stretch seated  30" x 2 B levator scap strectch seated  X 10 AROM cervical flexion  X 10 AROM cervical extension   C/o mild pain with end range extension  2 X 10, c/ 3" hold seated scap retractions  x 10 c/ 3" hold seated chin tucks  2 X 10 seated thoracic extension, c/ 3" hold  3 x 10 seated hip AB c/ GTB    X 10 each LE, SLS with alternate LE tapping 2 medium cones placed forward before returning to midline; BUE support and CGA    Gait: Pt ambulated  ~ 170ft in/out of gym with SC and CGA/close SBA.  Pt with lateral steps out, but no loss of balance. Increased time to complete    Written Home Exercises: Continue HEP   Supine Quad sets, SLR, bridges, hip abd/add, Heel slides  Pt demo good understanding of the education provided. Selma demonstrated good return demonstration of activities.     Education provided re: POC, " HEP  No spiritual or educational barriers to learning provided    Pt has no cultural, educational or language barriers to learning provided.    Assessment   Selma tolerated therapy session well this afternoon without difficulty. Pt continues to require increased supervision during ambulation due to remaining unsteadiness during ambulation due to occasional lateral stepping, forward flexed/L laterally flexed posture, and decreased safety awareness.  During seated therex, pt continues to required constant VCs and TCs from PT for improved posture and to correct L lateral leaning. Pt also required constant VCs throughout each exercise due to decreased cognition and decreased carry over. Pt to benefit from continued PT intervention to further address remaining strength, posture, balance, and ambulation deficits.       Pt rehab potential is Good. Pt will benefit from continuing skilled outpatient physical therapy to address the deficits listed below in the problem list, provide pt/family education and to maximize pt's level of independence in the home and community environment.      History  Co-morbidities and personal factors that may impact the plan of care Examination  Body Structures and Functions, activity limitations and participation restrictions that may impact the plan of care. Medical necessity is demonstrated by the following impairments. Clinical Presentation Decision Making/ Complexity Score   1. OA of multiple joints  2. LBP  3. Visual deficits  4. Cognitive deficits  5. B rotator cuff repair  6. Vascular dementia 1. Decreased balance  2. Gait deficits  3. Increased fall risk  4. Decreased LE strength  5. Impaired endurance  6. Postural deficits evolving-unpredictable symptom presentation, cognitive deficits     high high moderate moderate         Pt's spiritual, cultural and educational needs considered and pt agreeable to plan of care and goals as stated below:      GOALS:   Short term goals: 4 weeks, pt  agrees to goals set.  7. Pt to report performing HEP daily to increased IND.  8. Pt to demonstrate nadiya hip flexion MMT score to 4/5 to improve functional mobility.   9. Pt to demonstrate L hip abduction MMT score to 4/5 to improve functional mobility.   10. Pt to improve L knee extension MMT score to 4/5 to improve functional mobility  11. Pt to improve L knee flexion MMT score to 4/5 to improve functional mobility  12. Pt to improve chair rise score to at least 8 with no hands for improved endurance with functional mobility  13. Pt to improve MCTSIB condition 4 score to at least 15 seconds for improved balance and decreased fall risk.   14. Pt to improve TUG score with SPC to no more than 16 seconds for improved safety with ambulation  15. Pt to improve SSWS to at least 0.75 m/sec for improved safety with community ambulation.     Long term goals: 8 weeks, pt agrees to goals set  11. Pt to demonstrate more upright standing/seated head/shoulder posture to improve balance.  12. Pt to improve SSWS to at least 0.86m/sec for improved safety with community ambulation.   13. Pt to demonstrate nadiya hip flexion strength of 4+/5 to improve functional mobility.  14. Pt to demonstrate L hip abduction strength to 4+/5 to improve functional mobility.  16. Pt to improve L knee extension MMT score to 4/+5 to improve functional mobility  17. Pt to improve L knee flexion MMT score to 4+/5 to improve functional mobility  18. Pt to improve chair rise score to at least 9 with no hands for improved endurance with functional mobility  19. Pt to improve MCTSIB condition 4 score to at least 20 seconds for improved balance and decreased fall risk.   20. Pt to improve TUG score with SPC to no more than 14 seconds for improved safety with ambulation  21. Pt to improve FOTO score to at least 60% for improved self concept with functional mobility.     Plan   Outpatient physical therapy 2 times weekly to include: Pt Education, HEP, therapeutic  exercises, neuromuscular re-education, therapeutic activities, manual therapy, joint mobilizations, aquatic therapy and modalities PRN to achieve established goals. Pt may be seen by PTA as part of the rehabilitation team.      Cont PT for  8 weeks.     Kayleen Bañuelos, PT   01/02/2018

## 2018-01-03 ENCOUNTER — HOSPITAL ENCOUNTER (OUTPATIENT)
Dept: CARDIOLOGY | Facility: CLINIC | Age: 83
Discharge: HOME | End: 2018-01-03
Attending: FAMILY MEDICINE
Payer: MEDICARE

## 2018-01-03 ENCOUNTER — OUTPATIENT CASE MANAGEMENT (OUTPATIENT)
Dept: ADMINISTRATIVE | Facility: OTHER | Age: 83
End: 2018-01-03

## 2018-01-03 ENCOUNTER — CLINICAL SUPPORT (OUTPATIENT)
Dept: CARDIOLOGY | Facility: CLINIC | Age: 83
End: 2018-01-03
Attending: FAMILY MEDICINE
Payer: MEDICARE

## 2018-01-03 DIAGNOSIS — I67.2 CEREBRAL ATHEROSCLEROSIS: ICD-10-CM

## 2018-01-03 DIAGNOSIS — I63.9 CEREBROVASCULAR ACCIDENT (CVA), UNSPECIFIED MECHANISM: ICD-10-CM

## 2018-01-03 LAB
DIASTOLIC DYSFUNCTION: YES
ESTIMATED PA SYSTOLIC PRESSURE: 37.11
INTERNAL CAROTID STENOSIS: NORMAL
MITRAL VALVE REGURGITATION: ABNORMAL
RETIRED EF AND QEF - SEE NOTES: 55 (ref 55–65)
TRICUSPID VALVE REGURGITATION: ABNORMAL

## 2018-01-03 PROCEDURE — 93880 EXTRACRANIAL BILAT STUDY: CPT | Mod: PBBFAC | Performed by: INTERNAL MEDICINE

## 2018-01-03 PROCEDURE — 93306 TTE W/DOPPLER COMPLETE: CPT | Mod: PBBFAC | Performed by: INTERNAL MEDICINE

## 2018-01-03 NOTE — PROGRESS NOTES
1/3/18  Care coordination with Dr. Lee and Dr. Giang via in basket messages concerning Inpt Rehab plans. CM collaborated with Aracely Quintana LCSW with [facility].

## 2018-01-03 NOTE — PROGRESS NOTES
Care coordinated with [facility] RN Domonique Baez and Epic chart reviewed.  Attempted to follow-up with patient's daughter, Rosy Rosado ([phone]).  Left voice mail message requesting return call.  LCSW will call again at a later date.

## 2018-01-03 NOTE — PROGRESS NOTES
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  She has seen Dr. Landa for her low back and had a Right L4/5 TFESI with Dr. Valdez.  She has left sided hemichorea.   She was last seen by me on 11/22/2017 and she had a one week history of worsening neck pain and hard to hold head up.   The neck pain has been on and off for years.  She was sent to PT, but when she went to appointment neck pain was gone, and they have been concentrating on mobility.  She was also having more lethargy and weakness.  She went to ER.  Today, she is having neck pain.  Her daughter says she has been having neck pain since she has been here.  The pain is all on the left side where she feels swelling.  The right side also hurts.  Her daughter says she is leaning forward and rubbing on her neck.  She has a hard time holding head up.  She does feel like the pain is worse as the day goes on.  She thinks they ran out of the muscle relaxer the robaxin.  She feels like there is swelling.  She feels like it it carotid.  We discussed the meds.  She does feel like the help.  Her daughter feels like the meds have been helping.      MRI cervical 3/2017  There is mild anterolisthesis C2 on C3 and mild retrolisthesis of C3 on C4.  There is also mild retrolisthesis of C6 on C7.  The spondylolisthesis is likely secondary to ligamentous laxity.  There is straightening of normal cervical lordosis. Vertebral body heights are well maintained without evidence of fracture or marrow signal abnormality suspicious for an infiltrative process.  There is loss of intervertebral disc space height at C5-6 and C6-7 Visualized posterior fossa, cervical cord, and upper thoracic cord demonstrate normal signal. Surrounding soft tissue structures demonstrate no significant abnormalities.      C2-3:  Posterior disc osteophyte complex and bilateral facet  arthropathy.  Effacement of the anterior thecal sac, without flattening of the spinal cord.  No significant neural foraminal narrowing.     C3-4:  Posterior disc osteophyte complex and bilateral facet arthropathy, resulting in effacement of the anterior thecal sac, without flattening of the spinal cord.  There is moderate left-sided neuroforaminal narrowing.       C4-5:  Posterior disc osteophyte complex and bilateral facet arthropathy, resulting in effacement of the anterior thecal sac, without flattening of the spinal cord.  There is no significant neural foraminal narrowing.     C5-6:  Posterior disc osteophyte complex and bilateral facet arthropathy, resulting in effacement of the anterior thecal sac, without flattening of spinal cord.  No significant neural foraminal narrowing.     C6-7:  Posterior disc osteophyte complex and bilateral facet arthropathy, resulting in effacement of the anterior thecal sac, without flattening of the spinal cord.  No significant neural foraminal narrowing.     C7-T1:  No significant spinal canal stenosis.  No significant neural foraminal narrowing.  Impression         There is multilevel degenerative changes of the cervical spine, resulting in mild spinal canal stenosis and neuroforaminal narrowing as detailed above.     X-ray cervical  AP, neutral lateral, flexion lateral, and extension lateral radiographs of the cervical spine were obtained.  Note that the cervicothoracic junction is not optimally visualized.  There is 2 mm anterolisthesis of C2 on C3 which increases to 3 mm on the flexion radiographs.  The remainder of the posterior vertebral alignment is satisfactory.  Vertebral body heights are well-maintained.  There are advanced degenerative changes identified throughout the cervical spine with disc space narrowing and anterior and posterior osteophyte formation.  There is solid bony fusion of the C5 and C6 vertebral bodies.  There is mild facet arthropathy noted throughout  the cervical spine.  Atherosclerotic calcification is identified in the region of the carotid arteries bilaterally.  Impression       2 mm anterolisthesis of C2 on C3 which appears to be degenerative in etiology.  This increases to 3 mm on the flexion radiograph of the cervical spine.  Cervical spondylosis.  Atherosclerosis.      Past Medical History:  No date: Anemia  No date: Arthritis  No date: Hashimoto's disease  No date: Hypertension  1/12/2016: IGT (impaired glucose tolerance)  No date: Joint pain  1/12/2016: Multinodular goiter    Past Surgical History:  No date: CATARACT EXTRACTION  9/29/2015: COLONOSCOPY N/A      Comment: Procedure: COLONOSCOPY;  Surgeon: FIDELINA Sanchez MD;  Location: [address]);                 Service: Endoscopy;  Laterality: N/A;  No date: JOINT REPLACEMENT      Comment: right knee  12/31/2013: KNEE SURGERY Left      Comment: TKR  No date: left parotidectomy      Comment: mixed tumor  No date: SALIVARY GLAND SURGERY  No date: TONSILLECTOMY    Review of patient's family history indicates:    Hypertension                   Mother                    Prostate cancer                Father                      Comment: prostate cancer    Breast cancer                  Maternal Grandmother      Lupus                          Neg Hx                    Psoriasis                      Neg Hx                    Melanoma                       Neg Hx                    Colon cancer                   Neg Hx                      Social History    Marital status:              Spouse name:                       Years of education:                 Number of children:               Social History Main Topics    Smoking status: Never Smoker                                                                Smokeless tobacco: Never Used                        Alcohol use: No                 Comment: very seldom     Drug use: No              Sexual activity: No                       Comment: ,  age 52,         Current Outpatient Prescriptions:  amlodipine (NORVASC) 5 MG tablet, TAKE 1 TABLET DAILY, Disp: 90 tablet, Rfl: 2  aspirin (ECOTRIN) 81 MG EC tablet, Take 1 tablet (81 mg total) by mouth once daily., Disp: 360 tablet, Rfl: 3  baclofen (LIORESAL) 10 MG tablet, Take 1 tablet (10 mg total) by mouth 3 (three) times daily., Disp: 90 tablet, Rfl: 2  deutetrabenazine (AUSTEDO) 9 mg Tab, Take 2 tablets by mouth 2 (two) times daily. Final titrating dose - needs initially titration pack from company, Disp: 120 tablet, Rfl: 11  diazePAM (VALIUM) 2 MG tablet, Take 1 tablet (2 mg total) by mouth 2 (two) times daily., Disp: 60 tablet, Rfl: 0  ferrous sulfate 325 (65 FE) MG EC tablet, Take 325 mg by mouth once daily. , Disp: , Rfl:   gabapentin (NEURONTIN) 300 MG capsule, Take 1 capsule (300 mg total) by mouth 3 (three) times daily., Disp: 60 capsule, Rfl: 11  hydrocodone-acetaminophen 5-325mg (NORCO) 5-325 mg per tablet, Take 1 tablet by mouth every 24 hours as needed for Pain., Disp: 30 tablet, Rfl: 0  hydroxychloroquine (PLAQUENIL) 200 mg tablet, TAKE 2 TABLETS ONCE DAILY, Disp: 180 tablet, Rfl: 1  linaclotide (LINZESS) 290 mcg Cap, Take 1 capsule (290 mcg total) by mouth once daily., Disp: 30 capsule, Rfl: 0  memantine (NAMENDA XR) 7-14-21-28 mg C24k, Follow titration instructions 7 mg x1. 14 mg x1 week. 21 mg x1 week. 28 mg x1 week., Disp: 1 each, Rfl: 0  memantine 28 mg CSpX, Take 1 capsule (28 mg total) by mouth once daily. START after titration pack completed., Disp: 30 capsule, Rfl: 3  montelukast (SINGULAIR) 10 mg tablet, TAKE 1 TABLET EVERY EVENING, Disp: 90 tablet, Rfl: 2  predniSONE (DELTASONE) 5 MG tablet, TAKE 2 TABLETS ONCE DAILY, Disp: 180 tablet, Rfl: 0  sertraline (ZOLOFT) 25 MG tablet, Take 1 tablet (25 mg total) by mouth once daily., Disp: 30 tablet, Rfl: 11  thiamine 100 MG tablet, Take 1 tablet (100 mg total) by mouth once daily., Disp: , Rfl:   valACYclovir (VALTREX)  1000 MG tablet, Take 1 tablet (1,000 mg total) by mouth 3 (three) times daily., Disp: 21 tablet, Rfl: 0    No current facility-administered medications for this visit.       Review of patient's allergies indicates:  No Known Allergies          Review of Systems   Constitution: Negative for weight gain and weight loss.   Cardiovascular: Negative for chest pain.   Respiratory: Negative for shortness of breath.    Musculoskeletal: Positive for joint pain and neck pain. Negative for joint swelling.   Gastrointestinal: Negative for abdominal pain and bowel incontinence.   Genitourinary: Negative for bladder incontinence.   Neurological: Negative for numbness.         Objective:        General: Selma is well-developed, well-nourished, appears stated age, in no acute distress, alert and oriented to time, place and person.     General    Vitals reviewed.  Constitutional: She is oriented to person, place, and time. She appears well-developed and well-nourished.   HENT:   Head: Normocephalic and atraumatic.   Pulmonary/Chest: Effort normal.   Neurological: She is alert and oriented to person, place, and time.   Psychiatric: She has a normal mood and affect. Her behavior is normal. Judgment and thought content normal.     General Musculoskeletal Exam   Gait: abnormal (in wheelchair gait not tested)     Back (L-Spine & T-Spine) / Neck (C-Spine) Exam     Tenderness   The patient is tender to palpation of the left SCM. Posterior midline palpation reveals tenderness of the Upper C-Spine and Lower C-Spine. Right paramedian tenderness of the Upper C-Spine and Lower C-Spine. Left paramedian tenderness of the Upper C-Spine and Lower C-Spine.     Neck (C-Spine) Range of Motion   Flexion:     30  Extension: 30Right Lateral Bend: 10 Left Lateral Bend: 10 Right Rotation: 30 Left Rotation: 30     Spinal Sensation   Right Side Sensation  C-Spine Level: normal   L-Spine Level: normal  S-Spine Level: normal  Left Side Sensation  C-Spine  Level: normal  L-Spine Level: normal  S-Spine Level: normal    Other She has no scoliosis .  Spinal Kyphosis:  Absent    Comments:  With pain in all directions      Muscle Strength   Right Upper Extremity   Biceps: 5/5/5   Deltoid:  5/5  Triceps:  5/5  Wrist Extension: 5/5/5   Finger Flexors:  5/5  Left Upper Extremity  Biceps: 5/5/5   Deltoid:  5/5  Triceps:  5/5  Wrist Extension: 5/5/5   Finger Flexors:  5/5  Right Lower Extremity   Hip Flexion: 5/5   Left Lower Extremity   Hip Flexion: 5/5     Reflexes     Left Side  Biceps:  2+  Triceps:  2+  Brachioradialis:  2+  Quadriceps:  2+  Achilles:  2+  Left Davis's Sign:  Absent  Babinski Sign:  absent    Right Side   Biceps:  2+  Triceps:  2+  Brachioradialis:  2+  Quadriceps:  2+  Achilles:  2+  Right Davis's Sign:  absent  Babinski Sign:  absent    Vascular Exam     Right Pulses        Carotid:                  2+    Left Pulses        Carotid:                  2+              Assessment:       1. Neck pain    2. Muscle spasm    3. Hemichorea    4. Spondylosis of cervical region without myelopathy or radiculopathy    5. Isolated cervical dystonia           Plan:       Orders Placed This Encounter    Ambulatory consult to Pain Clinic    Ambulatory Referral to Physical/Occupational Therapy    gabapentin (NEURONTIN) 300 MG capsule    hydrocodone-acetaminophen 5-325mg (NORCO) 5-325 mg per tablet    baclofen (LIORESAL) 10 MG tablet     1.  Pain management appointment with Dr. Gonzalez, perhaps botox in SCM she does have DJD of the cervical spine but tenderness is SCM is what seems to bother her the most  2. add PT for neck ROM, strengthening and retractions and [facility]ascial [facility].  She is going to therapy for mobility.    3.  Baclofen 10mg po TID  4.   increase gabapentin to 600 mg po TID, they will start with 600 at night and then add  5.  Norco 5/325 number 30 as needed  6.  Her pain is improved from last visit.  She is not moving as much in her chair.   Her daughter feels like she is doing better.  She has been helping with the meds  7.  RTC 8 weeks      Follow-up: Return in about 8 weeks (around 3/1/2018). If there are any questions prior to this, the patient was instructed to contact the office.

## 2018-01-04 ENCOUNTER — TELEPHONE (OUTPATIENT)
Dept: SPINE | Facility: CLINIC | Age: 83
End: 2018-01-04

## 2018-01-04 ENCOUNTER — OUTPATIENT CASE MANAGEMENT (OUTPATIENT)
Dept: ADMINISTRATIVE | Facility: OTHER | Age: 83
End: 2018-01-04

## 2018-01-04 ENCOUNTER — OFFICE VISIT (OUTPATIENT)
Dept: SPINE | Facility: CLINIC | Age: 83
End: 2018-01-04
Attending: PHYSICAL MEDICINE & REHABILITATION
Payer: MEDICARE

## 2018-01-04 VITALS
DIASTOLIC BLOOD PRESSURE: 67 MMHG | SYSTOLIC BLOOD PRESSURE: 145 MMHG | HEIGHT: 65 IN | WEIGHT: 157 LBS | BODY MASS INDEX: 26.16 KG/M2 | HEART RATE: 87 BPM

## 2018-01-04 DIAGNOSIS — G24.3 ISOLATED CERVICAL DYSTONIA: ICD-10-CM

## 2018-01-04 DIAGNOSIS — G25.5 HEMICHOREA: ICD-10-CM

## 2018-01-04 DIAGNOSIS — M47.812 SPONDYLOSIS OF CERVICAL REGION WITHOUT MYELOPATHY OR RADICULOPATHY: ICD-10-CM

## 2018-01-04 DIAGNOSIS — M54.2 NECK PAIN: Primary | ICD-10-CM

## 2018-01-04 DIAGNOSIS — M62.838 MUSCLE SPASM: ICD-10-CM

## 2018-01-04 PROCEDURE — 99214 OFFICE O/P EST MOD 30 MIN: CPT | Mod: S$PBB,,, | Performed by: PHYSICAL MEDICINE & REHABILITATION

## 2018-01-04 PROCEDURE — 99999 PR PBB SHADOW E&M-EST. PATIENT-LVL IV: CPT | Mod: PBBFAC,,, | Performed by: PHYSICAL MEDICINE & REHABILITATION

## 2018-01-04 PROCEDURE — 99214 OFFICE O/P EST MOD 30 MIN: CPT | Mod: PBBFAC | Performed by: PHYSICAL MEDICINE & REHABILITATION

## 2018-01-04 RX ORDER — HYDROCODONE BITARTRATE AND ACETAMINOPHEN 5; 325 MG/1; MG/1
1 TABLET ORAL
Qty: 30 TABLET | Refills: 0 | Status: SHIPPED | OUTPATIENT
Start: 2018-01-04 | End: 2018-02-09 | Stop reason: SDUPTHER

## 2018-01-04 RX ORDER — BACLOFEN 10 MG/1
10 TABLET ORAL 3 TIMES DAILY
Qty: 270 TABLET | Refills: 2 | Status: ON HOLD | OUTPATIENT
Start: 2018-01-04 | End: 2018-05-01 | Stop reason: HOSPADM

## 2018-01-04 RX ORDER — GABAPENTIN 300 MG/1
300-600 CAPSULE ORAL 3 TIMES DAILY
Qty: 540 CAPSULE | Refills: 1 | Status: ON HOLD | OUTPATIENT
Start: 2018-01-04 | End: 2018-05-01 | Stop reason: HOSPADM

## 2018-01-04 NOTE — TELEPHONE ENCOUNTER
----- Message from Domonique Baez RN sent at 1/4/2018 11:14 AM CST -----  Contact: MARILYN Jerez Dr./Staff:  Good morning. Outpatient Case Management continues to be involved in  Lux's care. Aracely Quintana LCSW and I have been in communication by in basket messages with Dr. Giang, Neuro and in person with Dr. eLe, PCP to facilitate pt as appropriate to Inpatient Rehab-- an end goal that pt/family continue to express.   It is hoped to clarify the ultimate therapy goal for Dr Lux who remains in OP PT/ST and to determine who would spear head the orders to Inpt Rehab if it is the plan?  Please let us know how we can assist and proceed in this matter. Unfortunately, the one daughter currently in -town from [place], plans to leave for home 1/10/18. A friend will stay with pt as family understands that Dr. Lux can not manage her ADLs/IADLs alone.  Please advise.   Thank you,   Domonique Baez, JOSEN, RN, CCM [facility]  TEL:  [phone]

## 2018-01-04 NOTE — TELEPHONE ENCOUNTER
Inpatient rehab is not something I think she qualifies for.  I do not write orders for inpatient rehab.  She would have to be approved for inpatient rehab.  Usually if patients are approved from home, which is not often, they have home health orders and the home health progress notes can get sent in to the rehab admit coordinator and rehab doctors for evaluation on if appropriate for inpatient rehab.  I have seen the patient for neck pain, she does not need inpatient rehab for her neck.  Her daughter has been in town and helping with meds and so doing better, but that is nursing care and not rehab.  We have not discussed mobility issues.  What is families goal for inpatient rehab, is it placement for a week, or placement for a month with nursing and PT.  A skilled nursing facility might be more what they thinking of to get the nursing needed for her multiple issues

## 2018-01-04 NOTE — PROGRESS NOTES
1/4/18  [facility] Dr. Dee Thomson in Physical Medicine involved in patient's OP therapy.

## 2018-01-05 ENCOUNTER — OUTPATIENT CASE MANAGEMENT (OUTPATIENT)
Dept: ADMINISTRATIVE | Facility: OTHER | Age: 83
End: 2018-01-05

## 2018-01-05 ENCOUNTER — DOCUMENTATION ONLY (OUTPATIENT)
Dept: INTERNAL MEDICINE | Facility: CLINIC | Age: 83
End: 2018-01-05

## 2018-01-05 ENCOUNTER — TELEPHONE (OUTPATIENT)
Dept: INTERNAL MEDICINE | Facility: CLINIC | Age: 83
End: 2018-01-05

## 2018-01-05 DIAGNOSIS — I63.9 CEREBROVASCULAR ACCIDENT (CVA), UNSPECIFIED MECHANISM: Primary | ICD-10-CM

## 2018-01-05 NOTE — TELEPHONE ENCOUNTER
----- Message from Domonique Baez RN sent at 1/5/2018  9:12 AM CST -----  Contact: MARILYN Jerez Dr.:  This is a request to discuss Dr. Lux's case further at your convenience.   I just attempted to reach you by phone. I will be at my [facility] office till 4:30 today and return M-F, 8 am - 4:30 pm.  There is a sense of urgency in that [place] 1/10/18. A friend will be staying with pt 24/7 until daughter can return.   Best regards,   OWEN Jerez, RN, CCM [facility]  TEL:  [phone]

## 2018-01-05 NOTE — PROGRESS NOTES
Case Management clinical  Domonique Baez has called me once again   concerning placing the patient is an inpatient rehabilitation Center.  I had a   discussion with Vascular neurologist earlier in the week and that was a   recommendation that previously had been made in an outpatient setting from her   other neurologic consultant.  I reminded Ms. Baez that we had placed the   consult for PM and R to address the possibility of stroke rehabilitation as an   inpatient back on December 12th.  She apparently had seen Dr. Thomson yesterday.    I did review that note, Ms. Baez reports that Dr. Thomson felt that the   patient was not a candidate for inpatient rehabilitation at this time.    I explained that in terms of my convocation to make decisions for inpatient   rehabilitation that was limited and that the patient would need a consult from   the PM and R team.  We will work to try to have that assessment done, an   assessment like that is typically done as an inpatient consult while the patient   is in with an acute event.  I will replace the order and asked our staff to try   to point the patient and the appropriate direction at this time.    It is my feeling that her current situation is complicated, we wish to do the   best thing for the patient to achieve the best long-term outcome, currently she   seems to be obtaining some degree of success with outpatient speech and physical   therapy, having ongoing difficulties with long-term care in the home given   daughters who live out of town and the typical difficulties with resourcing we   see in patients with disabilities.  Domonique and I both agree to try do our best to   optimize this situation.      SELENA/HN  dd: 01/05/2018 13:22:41 (CST)  td: 01/05/2018 21:51:19 (CST)  Doc ID   #[id number]  Job ID #[id number]    CC:

## 2018-01-05 NOTE — TELEPHONE ENCOUNTER
----- Message from Domonique Baez RN sent at 1/5/2018  2:18 PM CST -----  Contact: MARILYN Jerez Dr./Staff:  Update---we have spoken by phone with Carola Saldivar, RN [facility] In[facility] Re[facility] TEL:  [phone] who will have pt's case reviewed for Inpt Rehab eval per your consult 12/12/17. They will have bed openings next week and because pt has Medicare she can be admitted quickly if she meet criteria for Inpt Rehab. Dgt's contact provided.   DaughterRosy was informed of this development. It was stressed by LCSW that the Rehab facility is pt/family choice, to visit the facility. A list of facilities for Inpt Rehab was provided during previous face-to-face encounter and daughter was encouraged to review/visit other sites if not satisfied with [facility]. Rosy reports being familiar with [facility] from a previous pt episode of care & was satisfied.   I spoke with Kayleen Bañuelos, PT working with pt. Provided this update to Kayleen FANG. Pt's condition continues to require ongoing cueing and assistance with amb and will require 24/7 supervision and care even with Inpt Rehab.     Will continue to update.   Thank you Dr. Lee for all of your assistance.     Domonique Baez, JOSEN, RN, CCM [facility]  TEL:  [phone]

## 2018-01-05 NOTE — TELEPHONE ENCOUNTER
See documentation dictation.    See meds, orders, follow up, routing and instructions sections of encounter.    Second Consult to inpatient PMR for assessment for appropriateness for inpatient rehab qualification.

## 2018-01-05 NOTE — PROGRESS NOTES
Telephonic follow-up with patient's daughter, Rosy Rosado.  Ms. Rosado said patient is still receiving OP PT/ST 2x/weekly, however patient/caregiver are still interested in IP Rehab placement.  Ms. Rosado said patient/caregiver's goal for inpatient rehab is for patient to be at optimal functional capacity.  She believes that patient would make better progress in an inpatient setting.  Ms. Rosado was informed that Dr. Lee has an ambulatory referral in [facility] for PMandR consult and that work on the placement was in progress.  [facility] RN Domonique Bae[facility].  [facility] also collaborated with Carola Saldivar, RN, [facility] Admission Liason for Inpatient Rehabilitation ([phone]), and Carey Walters, also at that facility, to discuss the referral.  Ms. Saldivar said the patient's chart would be reviewed and that she would follow-up with Ms. Rosado regarding the decision for placement.  [facility]ado back  to provide this information. She said she was familiar with the [facility] because her mother stayed there for a week in the past, and she was pleased with the facility.  [facility] informed Ms. Rosado that patient had a right to choose another facility if she wanted to do so. She said was directed to the Senior Resource Guide for a listing of other Inpatient Rehab facilities.  Ms. Rosado was informed that it would be up to her to visit the other facility of her choice and speak with the Admissions Coordinator at the facility about placement.  Ms. Rosado said she will review the list this weekend and let this LCSW know if she would like placement at an IP Rehab facility other then [facility].  She said she has extended her stay in  [place] for one more week.  Dr. Lee and MARILYN Baez have been CC'd on this note.    Interventions:  Per  POC    Plan:   Follow-up on IP Rehab placement  Coordinate care with Care Team as needed.

## 2018-01-05 NOTE — TELEPHONE ENCOUNTER
----- Message from Domonique Baez RN sent at 1/5/2018  2:18 PM CST -----  Contact: MARILYN Jerez Dr./Staff:  Update---we have spoken by phone with Carola Saldivar, RN [facility] TEL:  [phone] who will have pt's case reviewed for Inpt Rehab eval per your consult 12/12/17. They will have bed openings next week and because pt has Medicare she can be admitted quickly if she meet criteria for Inpt Rehab. Dgt's contact provided.   DaughterRosy was informed of this development. It was stressed by LCSW that the Rehab facility is pt/family choice, to visit the facility. A list of facilities for Inpt Rehab was provided during previous face-to-face encounter and daughter was encouraged to review/visit other sites if not satisfied with [facility]. Rosy reports being familiar with [facility] from a previous pt episode of care & was satisfied.   I spoke with Kayleen Bañuelos, PT working with pt. Provided this update to Kayleen FANG. Pt's condition continues to require ongoing cueing and assistance with amb and will require 24/7 supervision and care even with Inpt Rehab.     Will continue to update.   Thank you Dr. Lee for all of your assistance.     Domonique Baez, JOSEN, RN, CCM [facility]  TEL:  [phone]

## 2018-01-05 NOTE — PROGRESS NOTES
DOCUMENTATION    I called Dr. Tobias to discuss the case at patient and family's request.  There   were several issues in Dr. Tobias's documentation that I had questions about.    Firstly, the assessment of vascular dementia, several small vessel strokes and   small vessel disease is diagnostic.  I informed Dr. Tobias that the patient had   had a neuropsychological evaluation already suggesting these diagnoses - Dr. Lagos and documentation is available in [facility] for review.    Dr. Tobias  suggested medications for depression.  I explained we had started   the patient on low-dose sertraline with followup.      Dr. Tobias would consider starting Namenda or Aricept at her followup   appointment with Neurology.    We discussed lipids.  The patient's last HDL was 76 with an LDL of 77.  Her   lipid panel was favorable, Dr. Tobias felt that lipid  management would   not necessarily be necessary at this time.  I will review her cardiovascular   studies once they are available and call the patient for further advice.    With regards to Skilled Nursing I explained that the patient to inpatient   skilled nursing or rehab would be a difficult transition due to insurance   regulations.  Dr. Tobias stated she would discuss the case with Dr. Giang and   consider a short-term admission for the patient to work to inpatient   rehabilitation.  The patient is in outpatient speech therapy, occupational and   physical therapy at this time and seems to be doing well.  She has family   support to get to appointments.    With regards to Brilinta, I asked Dr. Tobias to could manage that medication   from their end  secondary to the risk profile.  I began the patient on a baby   aspirin daily.      SELENA/HN  dd: 01/05/2018 08:09:09 (CST)  td: 01/05/2018 21:42:27 (CST)  Doc ID   #[id number]  Job ID #[id number]    CC:

## 2018-01-05 NOTE — TELEPHONE ENCOUNTER
Spoke with Domonique Baez RN, she is waiting for the PT that is currently working with Dr Lux to call her back. She will provide updates once she hears from him. Provider her with my ext [phone].

## 2018-01-08 ENCOUNTER — OFFICE VISIT (OUTPATIENT)
Dept: GASTROENTEROLOGY | Facility: CLINIC | Age: 83
End: 2018-01-08
Payer: MEDICARE

## 2018-01-08 ENCOUNTER — OUTPATIENT CASE MANAGEMENT (OUTPATIENT)
Dept: ADMINISTRATIVE | Facility: OTHER | Age: 83
End: 2018-01-08

## 2018-01-08 VITALS
DIASTOLIC BLOOD PRESSURE: 76 MMHG | BODY MASS INDEX: 25.53 KG/M2 | WEIGHT: 153.44 LBS | HEART RATE: 83 BPM | SYSTOLIC BLOOD PRESSURE: 146 MMHG

## 2018-01-08 DIAGNOSIS — D50.9 IRON DEFICIENCY ANEMIA, UNSPECIFIED IRON DEFICIENCY ANEMIA TYPE: ICD-10-CM

## 2018-01-08 DIAGNOSIS — Z79.52 LONG TERM (CURRENT) USE OF SYSTEMIC STEROIDS: ICD-10-CM

## 2018-01-08 DIAGNOSIS — K58.2 IRRITABLE BOWEL SYNDROME WITH BOTH CONSTIPATION AND DIARRHEA: Primary | ICD-10-CM

## 2018-01-08 PROCEDURE — 99999 PR PBB SHADOW E&M-EST. PATIENT-LVL II: CPT | Mod: PBBFAC,,, | Performed by: INTERNAL MEDICINE

## 2018-01-08 PROCEDURE — 99212 OFFICE O/P EST SF 10 MIN: CPT | Mod: PBBFAC,PO | Performed by: INTERNAL MEDICINE

## 2018-01-08 PROCEDURE — 99214 OFFICE O/P EST MOD 30 MIN: CPT | Mod: S$PBB,,, | Performed by: INTERNAL MEDICINE

## 2018-01-08 NOTE — PROGRESS NOTES
1/8/18  Chart reviewed. Pending review for IP Rehab. Collaborated with Aracely Quintana LCSW w/[facility].

## 2018-01-10 ENCOUNTER — TELEPHONE (OUTPATIENT)
Dept: INTERNAL MEDICINE | Facility: CLINIC | Age: 83
End: 2018-01-10

## 2018-01-10 ENCOUNTER — CLINICAL SUPPORT (OUTPATIENT)
Dept: REHABILITATION | Facility: HOSPITAL | Age: 83
End: 2018-01-10
Attending: PHYSICAL MEDICINE & REHABILITATION
Payer: MEDICARE

## 2018-01-10 ENCOUNTER — OUTPATIENT CASE MANAGEMENT (OUTPATIENT)
Dept: ADMINISTRATIVE | Facility: OTHER | Age: 83
End: 2018-01-10

## 2018-01-10 DIAGNOSIS — Z74.09 IMPAIRED FUNCTIONAL MOBILITY, BALANCE, GAIT, AND ENDURANCE: ICD-10-CM

## 2018-01-10 DIAGNOSIS — R41.841 COGNITIVE COMMUNICATION DEFICIT: ICD-10-CM

## 2018-01-10 DIAGNOSIS — R47.1 DYSARTHRIA: ICD-10-CM

## 2018-01-10 PROCEDURE — 97127 *HC SP COG SKL DEV EA 15 MIN: CPT | Mod: PO

## 2018-01-10 PROCEDURE — 97112 NEUROMUSCULAR REEDUCATION: CPT | Mod: PO

## 2018-01-10 PROCEDURE — 97110 THERAPEUTIC EXERCISES: CPT | Mod: PO

## 2018-01-10 NOTE — PROGRESS NOTES
OUTPATIENT NEUROLOGICAL REHABILITATION  SPEECH THERAPY PROGRESS NOTE    Date:  1/10/2018     Start Time:  1115  Stop Time:  1200    Subjective/History  Onset Date:  Approximately 1 to 2 years ago  Primary Diagnosis:  Cognitive Impairment, Hemichorea, Debility  Treatment Diagnosis:    1. Cognitive communication deficit      2. Dysarthria         Referring Provider:  Dr. 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     Past Medical History:   Past Medical History:   Diagnosis Date    Anemia     Arthritis     Hashimoto's disease     Hypertension     IGT (impaired glucose tolerance) 1/12/2016    Joint pain     Multinodular goiter 1/12/2016     Precautions:  Memory loss        TIMED  Procedure Min.    Cognitive Therapy  48         UNTIMED  Procedure Min.                  Total Minutes: 45  Total Timed Units: 3  Total Untimed Units: 0  Charges Billed/# of units: 3    Visit #: 6  Date of Evaluation:  12/19/17  Plan of Care Expiration:    02/16/18  Extended POC:    G-CODE   6/10    eval   CK Memory   update          Progress/Current Status    Subjective:     Pain: 6 /10  Pt complained of neck pain by the left carotid artery.    Objective:     Short Term Goals: (6 weeks) Current Progress:   1. Dr. Lux will complete short term memory tasks (immediate) with 90% acc given min cues over 3 consecutive sessions.    -Pt recalled directions given to her for therapy tasks today with 100% acc.      2. Dr. Lux will complete short term memory tasks (delayed) with 90% acc given min cues over 3 consecutive sessions. -  Pt recalled some test results about her carotid artery. She did not recall some specific details such as the next step.      3. Dr. Lux will complete mental  flexibility/manipulation tasks with 90% acc given min cues over 3 consecutive sessions. -  Constant Therapy - Alternating Word Order (uppercase and lower letters): L 2 - 96% acc ind'ly. It took her about 20  -22 mins to complete.   4. Dr. Lux will problem solving, reasoning and thought organization tasks with with 90% acc given min cues over 3 consecutive sessions. - Deduction puzzle - 50% acc ind'ly, 100% acc given mod cues   5. Dr. Lux will list 15 to 20 items in concrete categories within one minute over 3 consecutive sessions. -  cities    6. Dr. Lux will utilize motor speech strategies (loud, over articulated speech, good breath support) in speech tasks with 90% accuracy given min cues over 3 consecutive sessions. - Pt's speech was 100% intelligible today. Goal met x 3           Assessment:   Slow processing but increased accuracy on some tasks but more cues needed for complex tasks.   Current goals remain appropriate. Goals to be updated as necessary.      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 She presents with impaired short term memory, word finding, problem solving, thought organization, executive function, and mental flexibility skills. Pt would benefit from continued skilled ST. Prognosis for improvement remains Good     Patient Education/Response:   Strategies to problem solve during the deduction puzzle.  She verbalized understanding.     Home Program: n/a    Plans and Goals:     Continue POC with focus on her cognitive-communicative deficits       Other Recommendations: 1. OT consult  2. Eye Exam     XUAN Gonzalez, CCC-SLP  Speech-Language Pathologist  [facility]  Neurological Rehabilitation

## 2018-01-10 NOTE — PROGRESS NOTES
"                                                    Physical Therapy Progress Note     Name: Selma Alonzo Lux MD  Clinic Number: [id number]  Diagnosis:   Encounter Diagnosis   Name Primary?    Impaired functional mobility, balance, gait, and endurance      Physician: Dee Thomson, *  Treatment Orders: PT Eval and Treat  Past Medical History:   Diagnosis Date    Anemia     Arthritis     Hashimoto's disease     Hypertension     IGT (impaired glucose tolerance) 1/12/2016    Joint pain     Multinodular goiter 1/12/2016     Re-Evaluation Date: 12/12/17  Total 2017 Visit #: 6  2018 Visit #: 2  Plan of care expiration: 12/5/2017  Extended POC Expiration: 02/06/17  Precautions: universal, visual impairment, impaired memory      G codes 8/10              Subjective   Pt reports: that she's doing OK but that she feels a little "off" today  Pain Scale:  denies    Objective     Patient received individual therapy with activities as follows:     Therapeutic exercises to increase strength and endurance x  30 min including;      X 10 min on Snapette recumbent bike.  Level 5.0 for improved CV endurance.     30" x 2 B UT stretch seated  30" x 2 B levator scap strectch seated    2 X 10, c/ 3" hold seated scap retractions c/ pink sports cord  x 10 c/ 3" hold supine deep neck flexors, TCs required throughout  2 X 10 seated thoracic extension, c/ 3" hold c/ GTB  2 x 10 B SLRs, AAROM/AROM    Patient participated in neuromuscular re-education activities to improve: Balance, Coordination and Posture for 15 minutes. The following activities were included:   4 pt foam fitter in // bars: CGA to Min A throughout   X 30" static stance   X 30" static stance c/ eyes closed   X 30" vertical head turns   X 30" horizontal head turns   X 30" LTR c/ soccer ball   X 30" upward chest press c/ soccer ball   X 30"' forward chest press c/ soccer ball        Gait: Pt ambulated  ~ 170ft in/out of gym with SC and CGA/close SBA.  Pt with " increased L lateral steps out, but no loss of balance. Increased time to complete    Written Home Exercises: Continue HEP   Supine Quad sets, SLR, bridges, hip abd/add, Heel slides  Pt demo good understanding of the education provided. Selma demonstrated good return demonstration of activities.     Education provided re: POC, HEP  No spiritual or educational barriers to learning provided    Pt has no cultural, educational or language barriers to learning provided.    Assessment   Selma tolerated therapy session well this morning without difficulty. Pt's ambulation appeared more unsteady this therapy session with increased L lateral veering and required CGA throughout most of ambulation during therapy session.  During seated therex, pt continues to required constant VCs and TCs from PT for improved posture and to correct L lateral leaning. Pt also required constant VCs throughout each exercise due to decreased cognition and decreased carry over. Pt tolerated standing balance activities well but did require CGA to Min A to maintain balance. Pt to benefit from continued PT intervention to further address remaining strength, posture, balance, and ambulation deficits.       Pt rehab potential is Good. Pt will benefit from continuing skilled outpatient physical therapy to address the deficits listed below in the problem list, provide pt/family education and to maximize pt's level of independence in the home and community environment.      History  Co-morbidities and personal factors that may impact the plan of care Examination  Body Structures and Functions, activity limitations and participation restrictions that may impact the plan of care. Medical necessity is demonstrated by the following impairments. Clinical Presentation Decision Making/ Complexity Score   1. OA of multiple joints  2. LBP  3. Visual deficits  4. Cognitive deficits  5. B rotator cuff repair  6. Vascular dementia 1. Decreased balance  2. Gait  deficits  3. Increased fall risk  4. Decreased LE strength  5. Impaired endurance  6. Postural deficits evolving-unpredictable symptom presentation, cognitive deficits     high high moderate moderate         Pt's spiritual, cultural and educational needs considered and pt agreeable to plan of care and goals as stated below:      GOALS:   Short term goals: 4 weeks, pt agrees to goals set.  7. Pt to report performing HEP daily to increased IND.  8. Pt to demonstrate nadiya hip flexion MMT score to 4/5 to improve functional mobility.   9. Pt to demonstrate L hip abduction MMT score to 4/5 to improve functional mobility.   10. Pt to improve L knee extension MMT score to 4/5 to improve functional mobility  11. Pt to improve L knee flexion MMT score to 4/5 to improve functional mobility  12. Pt to improve chair rise score to at least 8 with no hands for improved endurance with functional mobility  13. Pt to improve MCTSIB condition 4 score to at least 15 seconds for improved balance and decreased fall risk.   14. Pt to improve TUG score with SPC to no more than 16 seconds for improved safety with ambulation  15. Pt to improve SSWS to at least 0.75 m/sec for improved safety with community ambulation.     Long term goals: 8 weeks, pt agrees to goals set  11. Pt to demonstrate more upright standing/seated head/shoulder posture to improve balance.  12. Pt to improve SSWS to at least 0.86m/sec for improved safety with community ambulation.   13. Pt to demonstrate nadiya hip flexion strength of 4+/5 to improve functional mobility.  14. Pt to demonstrate L hip abduction strength to 4+/5 to improve functional mobility.  16. Pt to improve L knee extension MMT score to 4/+5 to improve functional mobility  17. Pt to improve L knee flexion MMT score to 4+/5 to improve functional mobility  18. Pt to improve chair rise score to at least 9 with no hands for improved endurance with functional mobility  19. Pt to improve MCTSIB condition 4  score to at least 20 seconds for improved balance and decreased fall risk.   20. Pt to improve TUG score with SPC to no more than 14 seconds for improved safety with ambulation  21. Pt to improve FOTO score to at least 60% for improved self concept with functional mobility.     Plan   Outpatient physical therapy 2 times weekly to include: Pt Education, HEP, therapeutic exercises, neuromuscular re-education, therapeutic activities, manual therapy, joint mobilizations, aquatic therapy and modalities PRN to achieve established goals. Pt may be seen by PTA as part of the rehabilitation team.      Cont PT for  8 weeks.     Kayleen Bañuelos, PT   01/10/2018

## 2018-01-10 NOTE — TELEPHONE ENCOUNTER
"----- Message from Domonique Baez RN sent at 1/10/2018  9:09 AM CST -----  Contact: MARILYN Jerez Dr./Staff:  Per Joy Chairs, RN [facility] Liaison, Dr. Rosado can "probably" be admitted this Fri. 1/12/18-- only a final decision on pt admission from their Medical Director is needed now.   I am to speak with Carola in the morning 1/11/18 as she requested to get the final decision.     Hopefully my next communication to you will be reporting pt's admission date to IP Rehab!    Sincerely,  OWEN Jerez, RN, [facility]  [facility] Management  TEL:  [phone]      "

## 2018-01-10 NOTE — TELEPHONE ENCOUNTER
"----- Message from Domonique Baez RN sent at 1/10/2018  8:48 AM CST -----  Contact: MARILYN Jerez Dr./Staff:  As stated [facility] is looking into pt's referral-- awaiting update. Meanwhile, daughter Rosy is requesting "a note" from Dr. Lee to present to the airline explaining her need to extend her stay from today to Mon 1/15/18.  I provided her the [facility] ph # and said I would send this message too.   Her direct contact number is [phone].   Please advise.   Thank you,  OWEN Jerez, RN, CCM [facility]  TEL:  [phone]  "

## 2018-01-10 NOTE — PROGRESS NOTES
"1/9/18  Email message received from daughter, Rosy Rosado sent 1 AM this morning. She is extending her visit until next Mon 1/15/18 to ensure IP Rehab admission. She is requesting a "doctor's note" to issue to airline explaining her return flight change.  Called Carola Saldivar RN in IP Rehab just now. She records this CM's contact to call back after her meeting.   Message sent to Dr. Lee regarding request for medical note for airlines.     9:00 am Call back from Carola Saldivar RN. Last step needed in pt's approval is the final okay from their IP Rehab Medical Director. Probably pt can be admitted this Fri 1/12/18 between 10 am- noon but again, this must be finalized per Med Director. Carola requests a call back tomorrow betw 10 am-10:30 am for final decision. Carola will plan to speak with daughter at that point.   CM updated Aracely Quintana, NATALIYA, Dr. Lee and daughter, Rosy Rosado.         "

## 2018-01-11 ENCOUNTER — DOCUMENTATION ONLY (OUTPATIENT)
Dept: REHABILITATION | Facility: HOSPITAL | Age: 83
End: 2018-01-11

## 2018-01-11 ENCOUNTER — OUTPATIENT CASE MANAGEMENT (OUTPATIENT)
Dept: ADMINISTRATIVE | Facility: OTHER | Age: 83
End: 2018-01-11

## 2018-01-11 DIAGNOSIS — Z74.09 IMPAIRED FUNCTIONAL MOBILITY, BALANCE, GAIT, AND ENDURANCE: ICD-10-CM

## 2018-01-11 NOTE — PROGRESS NOTES
I, KAITLIN GraffT met with Katerine Mchugh PTA regarding this patient's POC. Continue to address postural deficits, L sided neck pain, BLE strength deficits, and balance. Pt does require increased VCs for safety and proper techniques with activities.     Face to face consultation with supervision PT held on 01/11/2018    Katerine Mchugh PTA

## 2018-01-11 NOTE — PROGRESS NOTES
1/11/18  10:40 am --CM attempted call to Carola Saldivar, RN Liaison--unable to leave a message. Spoke with  at [facility] Re[facility]- message hand delivered to Carola who is at present in a mtg, unable to come to phone. CM to expect call back from Carola as soon as she is able. CM stressed the importance of today's coordination of care for patient, Dr. Lux in getting the answer to whether pt has been approved for an IP REHAB admission. Pt's MRN # provided.   11:25 am -LCSW and CM made an on-site visit to [facility], in hopes of speaking directly with Carola Saldivar, RN Liaison for answers to pt's IP REHAB approval. Employee, Aileen at  placed call to Carola successfully with same contact # and CM able to speak telephonically. Carola reports it appears there is no bed available, no Med Director input, pt records to be further reviewed. CM stressed to Carola the urgency in the referral given pt's daughter having to return home beginning next week. Carola states she will speak with the Med Director, Dr. Morgan and call this CM back today.   CM's contact number provided to her.   3:15 pm - No call back from Carola. CM attempted to f/u with Carola RN- answering machine reached. Spoke with [facility] who reconnects CM to pt's voice mail box. No one else on premises to speak to. [facility] recommends speaking with Sonny's counterpart, Pam Jenkins. CM spoke telephonically with Pam, provided gen case information. Pam confirms no current available pt beds today. She says she or Carola will communicate back to this CM with update. aPm advises in any case, to call after their 8-9 am meeting/discharges. Pam records this CM's contact number. [facility] daughter, Rosy Rosado with today's update.    3:45 pm--Incoming call from Rosy before LCSW call to her. This CM provided update-- admission pending available bed and Med Director approval. Rosy is gracious and understanding about the process involved. CM explored the  idea of alternate IP Rehab facilities. Rosy states they had thought about [facility] out of recommendations from others- no investigated however when it appeared pt might get into to [facility]. Rosy states she prefers for pt to be where her physicians and records are together. Rosy says she will  her med noted for the airline flight extension tomorrow from Dr. Lee. Her flight is in a holding status--and not booked yet--wanting to be sure pt is set up with rehab. She asks CM whether she should have a bag packed and ready for an IP REHAB admission. This CM indicated that it is hard to say however, CM agreed with daughter in having pt's toiletries together in event of an admission is okay to do. Rosy reports attempted to reach Carola Saldivar, RN stating her answer machine stated she was out of the office until June 26th and not to leave a message, messages will not be checked. She asks whether she should try to reach Carola again or not. CM stated that it may help if she called along with CM and LCSW to reinforce admission efforts.   She agrees to update again tomorrow morning. Updated LCSW of daughter's call.

## 2018-01-12 ENCOUNTER — OUTPATIENT CASE MANAGEMENT (OUTPATIENT)
Dept: ADMINISTRATIVE | Facility: OTHER | Age: 83
End: 2018-01-12

## 2018-01-12 ENCOUNTER — CLINICAL SUPPORT (OUTPATIENT)
Dept: REHABILITATION | Facility: HOSPITAL | Age: 83
End: 2018-01-12
Attending: PHYSICAL MEDICINE & REHABILITATION
Payer: MEDICARE

## 2018-01-12 DIAGNOSIS — Z74.09 IMPAIRED FUNCTIONAL MOBILITY, BALANCE, GAIT, AND ENDURANCE: ICD-10-CM

## 2018-01-12 DIAGNOSIS — R47.1 DYSARTHRIA: ICD-10-CM

## 2018-01-12 DIAGNOSIS — R41.841 COGNITIVE COMMUNICATION DEFICIT: ICD-10-CM

## 2018-01-12 PROCEDURE — 97112 NEUROMUSCULAR REEDUCATION: CPT | Mod: PO

## 2018-01-12 PROCEDURE — 97127 *HC SP COG SKL DEV EA 15 MIN: CPT | Mod: PO

## 2018-01-12 PROCEDURE — G8979 MOBILITY GOAL STATUS: HCPCS | Mod: CK,PO

## 2018-01-12 PROCEDURE — G8978 MOBILITY CURRENT STATUS: HCPCS | Mod: CK,PO

## 2018-01-12 PROCEDURE — 97110 THERAPEUTIC EXERCISES: CPT | Mod: PO

## 2018-01-12 NOTE — PROGRESS NOTES
OUTPATIENT NEUROLOGICAL REHABILITATION  SPEECH THERAPY PROGRESS NOTE    Date:  1/12/2018     Start Time:  1115  Stop Time:  1200    Subjective/History  Onset Date:  Approximately 1 to 2 years ago  Primary Diagnosis:  Cognitive Impairment, Hemichorea, Debility  Treatment Diagnosis:    1. Cognitive communication deficit      2. Dysarthria         Referring Provider:  Dr. 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     Past Medical History:   Past Medical History:   Diagnosis Date    Anemia     Arthritis     Hashimoto's disease     Hypertension     IGT (impaired glucose tolerance) 1/12/2016    Joint pain     Multinodular goiter 1/12/2016     Precautions:  Memory loss        TIMED  Procedure Min.    Cognitive Therapy  48         UNTIMED  Procedure Min.                  Total Minutes: 45  Total Timed Units: 3  Total Untimed Units: 0  Charges Billed/# of units: 3    Visit #: 7  Date of Evaluation:  12/19/17  Plan of Care Expiration:    02/16/18  Extended POC:    G-CODE   7/10    eval   CK Memory   update          Progress/Current Status    Subjective:     Pain: 6 /10  Pt complained of neck pain by the left carotid artery.    Objective:     Short Term Goals: (6 weeks) Current Progress:   1. Dr. Lux will complete short term memory tasks (immediate) with 90% acc given min cues over 3 consecutive sessions.    - Pt recalled info read from a paragraph with 60% acc ind'ly, 100% acc given 1 cue and 1 repetition.  - Pt completed a Word list retention - category inclusion task: 4 words - 40% acc ind'ly, 80% acc given reps; 5 words - max repetitions needed for 80% acc       2. Dr. Lux will complete short term memory tasks (delayed) with 90% acc given min cues over 3 consecutive  sessions. -  . Not formally addressed        3. Dr. Lux will complete mental flexibility/manipulation tasks with 90% acc given min cues over 3 consecutive sessions. -  Pt completed a Word list retention - category inclusion task: 4 words - 40% acc ind'ly, 80% acc given reps; 5 words - max repetitions needed for 80% acc   4. Dr. Lux will problem solving, reasoning and thought organization tasks with with 90% acc given min cues over 3 consecutive sessions. - Category matrix- 81% acc ind'ly, 100% acc given min cues  - Paragraph inferences: 100% acc ind'ly   5. Dr. Lux will list 15 to 20 items in concrete categories within one minute over 3 consecutive sessions. - Not formally addressed      6. Dr. Lux will utilize motor speech strategies (loud, over articulated speech, good breath support) in speech tasks with 90% accuracy given min cues over 3 consecutive sessions. - Pt's speech was 100% intelligible today. Goal met x 3           Assessment:   Increased processing time but still attention and immediate memory deficits.   Current goals remain appropriate. Goals to be updated as necessary.      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 She presents with impaired short term memory, word finding, problem solving, thought organization, executive function, and mental flexibility skills. Pt would benefit from continued skilled ST. Prognosis for improvement remains Good     Patient Education/Response:   Strategies to remember words for immediate memory tasks.  She verbalized understanding.     Home Program: n/a    Plans and Goals:     Continue POC with focus on her cognitive-communicative deficits       Other Recommendations: 1. OT consult  2. Eye Exam     XUAN Gonzalez, CCC-SLP  Speech-Language Pathologist  [facility]  Neurological Rehabilitation

## 2018-01-12 NOTE — PROGRESS NOTES
1/12/18  Incoming call received from Carola Saldivar RN IP REHAB--- there are no available beds today, will be day- to- day, that anticipated IP REHAB discharges yest and today have not occurred plus [facility] has pending transfers to IP [facility]AB taking priority in their admissions. An update can be obtained on discharge/admissions/bed availability on Mon 1/15.   This CM updated patient's daughter, Rosy Rosado that no IP REHAB beds are presently available for a pt admission today. She reports obtaining her medical note from PCP that is allowing her to extend her return home flight without added charge for now. Daughter's plan is to stay until her mother is settled in IP REHAB plus until her help arranged to stay with pt arrivves 1/20/18. Daughter must return home by the end of this month and beginning of Feb. She plans to return to pt in a month. CM asks Rosy whether she has any other chosen IP REHABs to explore. Rosy's voice a preference for [facility] as a 2nd choice.    NATALIA and DHARMESHW collaborated with [facility]- TEL:[phone] / FAX: [phone]. NATALIA spoke with Danny and her supervisor, April-- then faxed pertinent pt history to Danny/April for review. Later faxed requested  PT/OT/ST Outpatient notes.     Plan--  F/u on Mon 1/15/18 on J admission approval.

## 2018-01-12 NOTE — PROGRESS NOTES
"                                                    Physical Therapy Progress Note     Name: Selma Lux MD  Clinic Number: [id number]  Diagnosis:   Encounter Diagnosis   Name Primary?    Impaired functional mobility, balance, gait, and endurance      Physician: Dee Thomson, *  Treatment Orders: PT Eval and Treat  Past Medical History:   Diagnosis Date    Anemia     Arthritis     Hashimoto's disease     Hypertension     IGT (impaired glucose tolerance) 1/12/2016    Joint pain     Multinodular goiter 1/12/2016     Re-Evaluation Date: 12/12/17  Total 2017 Visit #: 6  2018 Visit #: 3  Plan of care expiration: 12/5/2017  Extended POC Expiration: 02/06/17  Precautions: universal, visual impairment, impaired memory      G codes 1/10             Subjective   Pt reports: that she's doing fine today  Pain Scale:  denies    Objective     Patient received individual therapy with activities as follows:     Therapeutic exercises to increase strength and endurance x  30 min including;      X 10 min on Cybex recumbent stepper .  Level 5.0 for improved CV endurance.     30" x 2 B UT stretch seated  30" x 2 B levator scap strectch seated    2 X 10, c/ 3" hold seated scap retractions c/ pink sports cord  2 x 10 B SLRs, AROM    Patient participated in neuromuscular re-education activities to improve: Balance, Coordination and Posture for 10 minutes. The following activities were included:   4 pt foam fitter in // bars: CGA to Min A throughout   X 30" static stance   X 10 each LE tapping medium cone placed in front of foam; touchdown support, Min A for balance  X 2 laps backward ambulation, no UE support, CGA to Min A; VCs to increase LYNDSEY width to prevent LOB    Gait: Pt ambulated  ~ 170ft in/out of gym with SC and CGA/close SBA.  Pt with increased L lateral steps out, but no loss of balance. Increased time to complete    Functional Limitations Reports - G Codes  Category:  Mobility  Tool:  FOTO for " Cerebrovascular Disorders  Score: Status: 46%  Current: CK at least 40% < 60% impaired, limited or restricted  Goal: CK at least 40% < 60% impaired, limited or restricted      Written Home Exercises: Continue HEP   Supine Quad sets, SLR, bridges, hip abd/add, Heel slides  Pt demo good understanding of the education provided. Selma demonstrated good return demonstration of activities.     Education provided re: POC, HEP  No spiritual or educational barriers to learning provided    Pt has no cultural, educational or language barriers to learning provided.    Assessment   Selma tolerated therapy session well this morning without difficulty. During seated therex, pt continues to required constant VCs and TCs from PT for improved posture and to correct L lateral leaning and for correct technique. Pt tolerated standing balance activities well but did require CGA to Min A to maintain balance. Mobility remains limited by poor posture, BLE weakness, decreased balance, decreased endurance, and decreased safety awareness. Cont. POC.     Pt rehab potential is Good. Pt will benefit from continuing skilled outpatient physical therapy to address the deficits listed below in the problem list, provide pt/family education and to maximize pt's level of independence in the home and community environment.      History  Co-morbidities and personal factors that may impact the plan of care Examination  Body Structures and Functions, activity limitations and participation restrictions that may impact the plan of care. Medical necessity is demonstrated by the following impairments. Clinical Presentation Decision Making/ Complexity Score   1. OA of multiple joints  2. LBP  3. Visual deficits  4. Cognitive deficits  5. B rotator cuff repair  6. Vascular dementia 1. Decreased balance  2. Gait deficits  3. Increased fall risk  4. Decreased LE strength  5. Impaired endurance  6. Postural deficits evolving-unpredictable symptom presentation,  cognitive deficits     high high moderate moderate         Pt's spiritual, cultural and educational needs considered and pt agreeable to plan of care and goals as stated below:      GOALS:   Short term goals: 4 weeks, pt agrees to goals set.  7. Pt to report performing HEP daily to increased IND.  8. Pt to demonstrate nadiya hip flexion MMT score to 4/5 to improve functional mobility.   9. Pt to demonstrate L hip abduction MMT score to 4/5 to improve functional mobility.   10. Pt to improve L knee extension MMT score to 4/5 to improve functional mobility  11. Pt to improve L knee flexion MMT score to 4/5 to improve functional mobility  12. Pt to improve chair rise score to at least 8 with no hands for improved endurance with functional mobility  13. Pt to improve MCTSIB condition 4 score to at least 15 seconds for improved balance and decreased fall risk.   14. Pt to improve TUG score with SPC to no more than 16 seconds for improved safety with ambulation  15. Pt to improve SSWS to at least 0.75 m/sec for improved safety with community ambulation.     Long term goals: 8 weeks, pt agrees to goals set  11. Pt to demonstrate more upright standing/seated head/shoulder posture to improve balance.  12. Pt to improve SSWS to at least 0.86m/sec for improved safety with community ambulation.   13. Pt to demonstrate nadiya hip flexion strength of 4+/5 to improve functional mobility.  14. Pt to demonstrate L hip abduction strength to 4+/5 to improve functional mobility.  16. Pt to improve L knee extension MMT score to 4/+5 to improve functional mobility  17. Pt to improve L knee flexion MMT score to 4+/5 to improve functional mobility  18. Pt to improve chair rise score to at least 9 with no hands for improved endurance with functional mobility  19. Pt to improve MCTSIB condition 4 score to at least 20 seconds for improved balance and decreased fall risk.   20. Pt to improve TUG score with SPC to no more than 14 seconds for improved  safety with ambulation  21. Pt to improve FOTO score to at least 60% for improved self concept with functional mobility.     Plan   Outpatient physical therapy 2 times weekly to include: Pt Education, HEP, therapeutic exercises, neuromuscular re-education, therapeutic activities, manual therapy, joint mobilizations, aquatic therapy and modalities PRN to achieve established goals. Pt may be seen by PTA as part of the rehabilitation team.      Cont PT for  8 weeks.     Kayleen Bañuelos, PT   01/12/2018

## 2018-01-15 ENCOUNTER — OUTPATIENT CASE MANAGEMENT (OUTPATIENT)
Dept: ADMINISTRATIVE | Facility: OTHER | Age: 83
End: 2018-01-15

## 2018-01-15 ENCOUNTER — TELEPHONE (OUTPATIENT)
Dept: INTERNAL MEDICINE | Facility: CLINIC | Age: 83
End: 2018-01-15

## 2018-01-15 NOTE — PROGRESS NOTES
Case collaboration with [facility] RN Domonique Baez.  LCSW contacted Carola Saldivar, [facility] ([phone]), to check on the status of patient's acceptance into the program.  Ms.  said they do not yet have a bed available but are expecting a spot to open up tomorrow.  LCSW contacted Rosy Rosado, patient's daughter to advise.  LCSW will continue to follow.      Plan:   Follow-up on IP Rehab placement  Coordinate care with Care Team as needed.

## 2018-01-15 NOTE — PROGRESS NOTES
1/15/18  Care collaboration with  [facility] this AM. Jessica reports that enough of the PT/OT/ST notes were successfully received on Fri 1/12/18 to review and she will check with April their  on whether pt is approved for an IP REHAB admission.   11:45 am-- Voice message received from Jessica. Dr. Lux was denied admission-- too functional and no acute medical condition.   This CM in turn left a message for Jessica thanking her and her team for working so promptly and closely with [facility].   This CM updated Aracely Quintana LCSW.  11: 50 am - This CM called and updated pt's daughter, Rosy Rosado of the  IP REHAB approval denial. She agrees to another update tomorrow am due to Carola Saldivar, RN Liaison at [facility] informing Aracely Quintana LCSW earlier this am that there might be a possible bed tomorrow.   Meanwhile, Rosy is hoping to return home this Thurs 1/18. The help they arrange to stay with pt is coming in 11/20. Rosy verbalizes her disappointment but agrees to further news from [facility] tomorrow.       Plan  1/16-- in am check with admission approval to IP [facility]

## 2018-01-16 ENCOUNTER — OUTPATIENT CASE MANAGEMENT (OUTPATIENT)
Dept: ADMINISTRATIVE | Facility: OTHER | Age: 83
End: 2018-01-16

## 2018-01-16 ENCOUNTER — CLINICAL SUPPORT (OUTPATIENT)
Dept: REHABILITATION | Facility: HOSPITAL | Age: 83
End: 2018-01-16
Attending: PHYSICAL MEDICINE & REHABILITATION
Payer: MEDICARE

## 2018-01-16 DIAGNOSIS — R47.1 DYSARTHRIA: ICD-10-CM

## 2018-01-16 DIAGNOSIS — R41.841 COGNITIVE COMMUNICATION DEFICIT: ICD-10-CM

## 2018-01-16 DIAGNOSIS — Z74.09 IMPAIRED FUNCTIONAL MOBILITY, BALANCE, GAIT, AND ENDURANCE: ICD-10-CM

## 2018-01-16 PROCEDURE — 97127 *HC SP COG SKL DEV EA 15 MIN: CPT | Mod: PO

## 2018-01-16 PROCEDURE — 97110 THERAPEUTIC EXERCISES: CPT | Mod: PO

## 2018-01-16 NOTE — PROGRESS NOTES
"                                                    Physical Therapy Progress Note     Name: Selma Alonzo Lux MD  Clinic Number: [id number]  Diagnosis:   Encounter Diagnosis   Name Primary?    Impaired functional mobility, balance, gait, and endurance      Physician: Dee Thomson, *  Treatment Orders: PT Eval and Treat  Past Medical History:   Diagnosis Date    Anemia     Arthritis     Hashimoto's disease     Hypertension     IGT (impaired glucose tolerance) 1/12/2016    Joint pain     Multinodular goiter 1/12/2016     Re-Evaluation Date: 12/12/17  Total 2017 Visit #: 6  2018 Visit #: 3  Plan of care expiration: 12/5/2017  Extended POC Expiration: 02/06/17  Precautions: universal, visual impairment, impaired memory      G codes 2/10             Subjective   Pt reports: that she's doing fine today but that her R foot is hurting because she has flat feet  Pain Scale:  2/10 R foot    Objective     Patient received individual therapy with activities as follows:     Therapeutic exercises to increase strength and endurance x  40 min including;      X 10 min on [employer] recumbent stepper .  Level 5.0 for improved CV endurance.     30" x 2 B UT stretch seated  30" x 2 B levator scap strectch seated    2 X 10, c/ 3" hold seated scap retractions c/ pink sports cord  3 x 10 B SLRs, AROM  3 x 10 bridges c/ 3" hold    2 x 10 side stepping R<>L over large green bolster in // bars to improve upright posture and hip AB strength, BUE support, CGA  X 10 forward steps over large green bolster, each LE, to improve step length, hip strength, and upright posture, BUE support, CGA   Standing rest breaks between standing activities    Gait: Pt ambulated  ~ 170ft in/out of gym with SC and CGA/close SBA.  Pt with increased L lateral steps out, but no loss of balance. Increased time to complete      Written Home Exercises: Continue HEP   Supine Quad sets, SLR, bridges, hip abd/add, Heel slides  Pt demo good understanding of " the education provided. Selma demonstrated good return demonstration of activities.     Education provided re: POC, HEP  No spiritual or educational barriers to learning provided    Pt has no cultural, educational or language barriers to learning provided.    Assessment   Selma tolerated therapy session well this morning without difficulty. Pt demonstrates improved carry over of exercises this morning and required less VCs for proper technique. Pt required brief standing rest breaks during standing activity in // bars but able to perform activity safely with decreased (A) from PT. Mobility remains limited by poor posture, decreased strength, and decreased cognition. Cont. POC to progress as tolerated.     Pt rehab potential is Good. Pt will benefit from continuing skilled outpatient physical therapy to address the deficits listed below in the problem list, provide pt/family education and to maximize pt's level of independence in the home and community environment.      History  Co-morbidities and personal factors that may impact the plan of care Examination  Body Structures and Functions, activity limitations and participation restrictions that may impact the plan of care. Medical necessity is demonstrated by the following impairments. Clinical Presentation Decision Making/ Complexity Score   1. OA of multiple joints  2. LBP  3. Visual deficits  4. Cognitive deficits  5. B rotator cuff repair  6. Vascular dementia 1. Decreased balance  2. Gait deficits  3. Increased fall risk  4. Decreased LE strength  5. Impaired endurance  6. Postural deficits evolving-unpredictable symptom presentation, cognitive deficits     high high moderate moderate         Pt's spiritual, cultural and educational needs considered and pt agreeable to plan of care and goals as stated below:      GOALS:   Short term goals: 4 weeks, pt agrees to goals set.  7. Pt to report performing HEP daily to increased IND.  8. Pt to demonstrate nadiya hip  flexion MMT score to 4/5 to improve functional mobility.   9. Pt to demonstrate L hip abduction MMT score to 4/5 to improve functional mobility.   10. Pt to improve L knee extension MMT score to 4/5 to improve functional mobility  11. Pt to improve L knee flexion MMT score to 4/5 to improve functional mobility  12. Pt to improve chair rise score to at least 8 with no hands for improved endurance with functional mobility  13. Pt to improve MCTSIB condition 4 score to at least 15 seconds for improved balance and decreased fall risk.   14. Pt to improve TUG score with SPC to no more than 16 seconds for improved safety with ambulation  15. Pt to improve SSWS to at least 0.75 m/sec for improved safety with community ambulation.     Long term goals: 8 weeks, pt agrees to goals set  11. Pt to demonstrate more upright standing/seated head/shoulder posture to improve balance.  12. Pt to improve SSWS to at least 0.86m/sec for improved safety with community ambulation.   13. Pt to demonstrate nadiya hip flexion strength of 4+/5 to improve functional mobility.  14. Pt to demonstrate L hip abduction strength to 4+/5 to improve functional mobility.  16. Pt to improve L knee extension MMT score to 4/+5 to improve functional mobility  17. Pt to improve L knee flexion MMT score to 4+/5 to improve functional mobility  18. Pt to improve chair rise score to at least 9 with no hands for improved endurance with functional mobility  19. Pt to improve MCTSIB condition 4 score to at least 20 seconds for improved balance and decreased fall risk.   20. Pt to improve TUG score with SPC to no more than 14 seconds for improved safety with ambulation  21. Pt to improve FOTO score to at least 60% for improved self concept with functional mobility.     Plan   Outpatient physical therapy 2 times weekly to include: Pt Education, HEP, therapeutic exercises, neuromuscular re-education, therapeutic activities, manual therapy, joint mobilizations, aquatic  therapy and modalities PRN to achieve established goals. Pt may be seen by PTA as part of the rehabilitation team.      Cont PT for  8 weeks.     Kayleen Bañuelos, PT   01/16/2018

## 2018-01-16 NOTE — PROGRESS NOTES
1/16/18  Continued coordination for goal of  IP REHAB admission.   9:10 am--CM made call to Carola Saldivar, RN [facility] Admission Liaison [facility] TEL:  [phone]. Carola states she just left their AM meeting/pt d/c updates. She says she will have to call CM back-pts are being shuffled around.   10 am-- incoming call from Dr. Branden Rubio formerly in Neuro now w/ [facility]. He is responding to messages sent previously to facilitate pt to IP REHAB. CM provided Dr. Rubio with today's update and requested any aide in the matter however it is out of physician's control --pending status.   12 noon- CM called and spoke with Joy w/[facility]- unfortunately, there are no beds available and 8 pending IP Acute pts waiting for IP Rehab. Their 21 beds are kept full occupancy.   12:10 pm CM called and spoke with pt daughter, Rosy to report no bed availability, the situation not good for IP Rehab admission from home. Rosy says she will get back with CM after she can think over things--presently pt is at OP PT/ST.   Updated Dr. Lee.   Notified Aracely Quintana LCSW.         Plan for next encounter  Check with daughter regarding her plans

## 2018-01-16 NOTE — PROGRESS NOTES
OUTPATIENT NEUROLOGICAL REHABILITATION  SPEECH THERAPY PROGRESS NOTE    Date:  1/16/2018     Start Time:  1115  Stop Time:  1200    Subjective/History  Onset Date:  Approximately 1 to 2 years ago  Primary Diagnosis:  Cognitive Impairment, Hemichorea, Debility  Treatment Diagnosis:    1. Cognitive communication deficit      2. Dysarthria         Referring Provider:  Dr. 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     Past Medical History:   Past Medical History:   Diagnosis Date    Anemia     Arthritis     Hashimoto's disease     Hypertension     IGT (impaired glucose tolerance) 1/12/2016    Joint pain     Multinodular goiter 1/12/2016     Precautions:  Memory loss        TIMED  Procedure Min.    Cognitive Therapy  48         UNTIMED  Procedure Min.                  Total Minutes: 45  Total Timed Units: 3  Total Untimed Units: 0  Charges Billed/# of units: 3    Visit #: 8  Date of Evaluation:  12/19/17  Plan of Care Expiration:    02/16/18  Extended POC:    G-CODE   8/10    eval   CK Memory   update          Progress/Current Status    Subjective:     Pain: 3 /10  Pt complained of neck pain.    Objective:     Short Term Goals: (6 weeks) Current Progress:   1. Dr. Lux will complete short term memory tasks (immediate) with 90% acc given min cues over 3 consecutive sessions.    - Constant Therapy: Pattern Recreation -   L 1 - 95% acc given cues initially to get started; L 2 - 90% acc ind'ly.    Constant Therapy: Auditory Command -   L 2 - 90% acc ind'ly       2. Dr. Lux will complete short term memory tasks (delayed) with 90% acc given min cues over 3 consecutive sessions. - Constant Therapy: Picture N Back - L 2 - 35% acc given cues.       3. Dr. Lux will complete  mental flexibility/manipulation tasks with 90% acc given min cues over 3 consecutive sessions. - Constant Therapy: Pattern Recreation -   L 1 - 95% acc given cues initially to get started; L 2 - 90% acc ind'ly.   4. Dr. Lux will problem solving, reasoning and thought organization tasks with with 90% acc given min cues over 3 consecutive sessions. - Pt identified problems and selected best solutions to problems (given 3 choices) with 67% acc ind'ly, 83% acc given cues.     - Pt unscrambled words given categories with 75% acc ind'ly, 90% acc given cues.    5. Dr. Lux will list 15 to 20 items in concrete categories within one minute over 3 consecutive sessions. - Not formally addressed      6. Dr. Lux will utilize motor speech strategies (loud, over articulated speech, good breath support) in speech tasks with 90% accuracy given min cues over 3 consecutive sessions. - Pt's speech was 100% intelligible today. Goal met x 3           Assessment:   Decreased attention/concentration towards end of the session.  Current goals remain appropriate. Goals to be updated as necessary.      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 She presents with impaired short term memory, word finding, problem solving, thought organization, executive function, and mental flexibility skills. Pt would benefit from continued skilled ST. Prognosis for improvement remains Good     Patient Education/Response:   Strategies to help concentrate.  She verbalized understanding.     Home Program: n/a    Plans and Goals:     Continue POC with focus on her cognitive-communicative deficits       Other Recommendations: None at this time.     XUAN Gonzalez, CCC-SLP  Speech-Language Pathologist  [facility]  Neurological Rehabilitation

## 2018-01-17 ENCOUNTER — OFFICE VISIT (OUTPATIENT)
Dept: NEUROLOGY | Facility: CLINIC | Age: 83
End: 2018-01-17
Payer: MEDICARE

## 2018-01-17 ENCOUNTER — TELEPHONE (OUTPATIENT)
Dept: PAIN MEDICINE | Facility: CLINIC | Age: 83
End: 2018-01-17

## 2018-01-17 ENCOUNTER — OFFICE VISIT (OUTPATIENT)
Dept: PAIN MEDICINE | Facility: CLINIC | Age: 83
End: 2018-01-17
Attending: PHYSICAL MEDICINE & REHABILITATION
Payer: MEDICARE

## 2018-01-17 ENCOUNTER — OUTPATIENT CASE MANAGEMENT (OUTPATIENT)
Dept: ADMINISTRATIVE | Facility: OTHER | Age: 83
End: 2018-01-17

## 2018-01-17 VITALS
HEART RATE: 86 BPM | BODY MASS INDEX: 26.12 KG/M2 | HEIGHT: 65 IN | SYSTOLIC BLOOD PRESSURE: 115 MMHG | DIASTOLIC BLOOD PRESSURE: 67 MMHG | WEIGHT: 156.75 LBS

## 2018-01-17 VITALS — HEIGHT: 65 IN | BODY MASS INDEX: 26.23 KG/M2 | TEMPERATURE: 98 F | WEIGHT: 157.44 LBS

## 2018-01-17 DIAGNOSIS — G24.3 CERVICAL DYSTONIA: Primary | ICD-10-CM

## 2018-01-17 DIAGNOSIS — F01.50 VASCULAR DEMENTIA WITHOUT BEHAVIORAL DISTURBANCE: ICD-10-CM

## 2018-01-17 DIAGNOSIS — G24.3 CERVICAL DYSTONIA: ICD-10-CM

## 2018-01-17 DIAGNOSIS — M79.18 MYOFASCIAL PAIN: Primary | ICD-10-CM

## 2018-01-17 DIAGNOSIS — M47.812 SPONDYLOSIS OF CERVICAL REGION WITHOUT MYELOPATHY OR RADICULOPATHY: ICD-10-CM

## 2018-01-17 DIAGNOSIS — M62.838 MUSCLE SPASM: ICD-10-CM

## 2018-01-17 DIAGNOSIS — R53.81 DEBILITY: ICD-10-CM

## 2018-01-17 DIAGNOSIS — M43.10 ANTEROLISTHESIS: ICD-10-CM

## 2018-01-17 DIAGNOSIS — F39 MOOD DISORDER: ICD-10-CM

## 2018-01-17 DIAGNOSIS — G25.5 HEMICHOREA: ICD-10-CM

## 2018-01-17 PROCEDURE — 20552 NJX 1/MLT TRIGGER POINT 1/2: CPT | Mod: PBBFAC | Performed by: ANESTHESIOLOGY

## 2018-01-17 PROCEDURE — 20552 NJX 1/MLT TRIGGER POINT 1/2: CPT | Mod: S$PBB,,, | Performed by: ANESTHESIOLOGY

## 2018-01-17 PROCEDURE — 99999 PR PBB SHADOW E&M-EST. PATIENT-LVL III: CPT | Mod: PBBFAC,,, | Performed by: ANESTHESIOLOGY

## 2018-01-17 PROCEDURE — 99215 OFFICE O/P EST HI 40 MIN: CPT | Mod: 25,S$PBB,, | Performed by: ANESTHESIOLOGY

## 2018-01-17 PROCEDURE — 99213 OFFICE O/P EST LOW 20 MIN: CPT | Mod: PBBFAC,25,27 | Performed by: STUDENT IN AN ORGANIZED HEALTH CARE EDUCATION/TRAINING PROGRAM

## 2018-01-17 PROCEDURE — 99213 OFFICE O/P EST LOW 20 MIN: CPT | Mod: S$PBB,GC,, | Performed by: STUDENT IN AN ORGANIZED HEALTH CARE EDUCATION/TRAINING PROGRAM

## 2018-01-17 PROCEDURE — 99213 OFFICE O/P EST LOW 20 MIN: CPT | Mod: PBBFAC | Performed by: ANESTHESIOLOGY

## 2018-01-17 PROCEDURE — 99999 PR PBB SHADOW E&M-EST. PATIENT-LVL III: CPT | Mod: PBBFAC,GC,, | Performed by: STUDENT IN AN ORGANIZED HEALTH CARE EDUCATION/TRAINING PROGRAM

## 2018-01-17 RX ORDER — BETAMETHASONE SODIUM PHOSPHATE AND BETAMETHASONE ACETATE 3; 3 MG/ML; MG/ML
6 INJECTION, SUSPENSION INTRA-ARTICULAR; INTRALESIONAL; INTRAMUSCULAR; SOFT TISSUE
Status: COMPLETED | OUTPATIENT
Start: 2018-01-17 | End: 2018-01-17

## 2018-01-17 RX ORDER — DIAZEPAM 2 MG/1
2 TABLET ORAL NIGHTLY
Qty: 7 TABLET | Refills: 0 | Status: SHIPPED | OUTPATIENT
Start: 2018-01-17 | End: 2018-01-18 | Stop reason: SDUPTHER

## 2018-01-17 RX ADMIN — BETAMETHASONE ACETATE AND BETAMETHASONE SODIUM PHOSPHATE 6 MG: 3; 3 INJECTION, SUSPENSION INTRA-ARTICULAR; INTRALESIONAL; INTRAMUSCULAR; SOFT TISSUE at 12:01

## 2018-01-17 NOTE — PROGRESS NOTES
"  Neurology Clinic  Follow-up Visit     Patient Name: Selma Lux MD  MRN: [id number]      CC: Hemichorea     HPI: Selma Lux MD is a 82 y.o. female w/ PMH significant for HTN, Hashimoto's, presenting as follow up for hemichorea.      I last saw Dr. 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  Since her last visit, she has seen Dr. Tobias w/ vascular neurology and has been working on getting into inpatient rehab.  She reports she has been following up with psychology and outpatient therapies.    Her daughter (visiting from the [place]) has been managing her medications and for two weeks they reliably trialed valium 2 mg BID.  They report that her chorea was improved with valium and helped her sleep at night, however, it made her drowsy during the day, her daughter describes it as "unless you tell me to move I'll sit here."  Started zoloft w/ PCP on 12/29.  Since zoloft, has had difficulty sleeping.  Of note, she ran out of valium and has not been taking it any further.  Overall, states and appears much improved compared to any of our prior visits.    She and her daughter are awaiting delivery of the namenda titration pack - unclear why it has not been delivered yet.  Although her daughter notes that they received a package the day prior to this appointment that she thinks may have contained a medicine, but she is not sure if it is namenda.       Prior histories  Dr. 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  At her last visit with Dr. Giang, she was started on valium 2 mg BID, which she states she has taken very inconsistently, if at all.  She reports no significant improvement in chorea to date.  Prior authorization for tetrabenazine was attempted, but unsuccessful.  As a result, we attempted to have her " "started on austedo.  Her austedo was delivered for her to begin taking, but the next day upon reviewing the medication, she called the clinic in significant distress that she was feeling overwhelmed and that it was too much for her.  She was asking for inpt rehab/nursing home placement at that time.  I was concerned that this may be indicative of a mood disorder, and that she may potentially be dangerous to herself (notably she denied any ideation/plan for SIHI at that time).  I advised her to call 911 and present to the ED.  On follow up call later that day, her mood was significantly improved and she declined to come to the ED.  I had another extended phone call with both the pt and her daughter, where I recommended that she NOT take austedo for concerns of her mood disorder, recommended she f/u with psychology and referral to psychiatry, and return to clinic for further management.     She presents today in significantly better spirits than she was while on the phone call described above.  She was recently seen by GI and then in the ED in the 2 days preceding her clinic visit today for abdominal pain (discharged home, advised to continue taking Linzess).       With regards to her hemichorea, her symptoms continue to persist, stable from prior.  She reports that she last took valium 1-2 weeks ago.  She initially states that she couldn't tell if it made a difference, however upon further questioning she states that it may have helped, and she would like to continue for now.     She also reports she has been participating in physical therapy.  She has f/u with outpt psychiatrist, Dr. Corea, with whom she has had two visits. Dr. Lux reports she is pleased with Dr. Corea, and believes these sessions are helpful.     Has psychiatrist appt for 2/2018.       She reports her mood is "quite good," denies any SIHI at this time.     Review of Systems:  Constitutional: no fever or chills  Eyes: no visual changes  ENT: " "no nasal congestion or sore throat  Respiratory: no cough or shortness of breath  Cardiovascular: no chest pain or palpitations  Gastrointestinal: no nausea or vomiting, no abdominal pain or change in bowel habits, positive for constipation  Genitourinary: no hematuria or dysuria  Integument/Breast: no rash or pruritis  Neurological: positive for hemichorea  Behavioral/Psych: no auditory or visual hallucinations      Physical Exam    Vitals:    01/17/18 1447   BP: 115/67   Pulse: 86   Weight: 71.1 kg (156 lb 12 oz)   Height: 5' 5" (1.651 m)        General: Well-developed, well-groomed. No apparent distress  Cardiovascular: Regular rate and rhythm with no murmurs, rubs or gallops.  Chest: Lungs clear to auscultation bilaterally.  No wheezes, stridor, ronchi appreciated.  Abdomen: Normoactive bowel sounds present.  Soft, nontender to palpation.  Extremities: No peripheral edema     Neurologic Exam: The patient is awake, alert and oriented. Language is fluent.  Fund of knowledge is appropriate.      Cranial nerves:   Pupils are round and reactive to light and accommodation.  Visual fields are full to confrontation.    Ocular motility is full in all cardinal positions of gaze.   Facial sensation is normal to light touch.   Facial activation is symmetric.   Hearing is symmetric bilaterally.   Palate elevates symmetrically.   Shoulder elevation is symmetric and full strength bilaterally.   Tongue is midline and neck rotation strength is normal bilaterally.      Motor examination L-sided hemichorea, occasional blepharospasm of L eye.  Strength is 4+/5 in the upper and lower extremities bilaterally.      Sensory examination is normal light touch in BUE and BLE.     Deep tendon reflexes are 2+ and symmetric in the upper and lower extremities bilaterally.  No clonus, equivocal toes b/l.     Gait: Unstable casual gait, ambulates with cane.     Coordination: Finger to nose intact b/l.  Chorea prominent on LLE with heel/shin, " overall improved compared to prior.           Lab and Test Results           WBC   Date Value Ref Range Status   12/01/2017 8.97 3.90 - 12.70 K/uL Final   11/07/2017 7.72 3.90 - 12.70 K/uL Final   09/07/2017 11.02 3.90 - 12.70 K/uL Final            Hemoglobin   Date Value Ref Range Status   12/01/2017 11.5 (L) 12.0 - 16.0 g/dL Final   11/07/2017 11.4 (L) 12.0 - 16.0 g/dL Final   09/07/2017 12.6 12.0 - 16.0 g/dL Final            Hematocrit   Date Value Ref Range Status   12/01/2017 37.2 37.0 - 48.5 % Final   11/07/2017 36.3 (L) 37.0 - 48.5 % Final   09/07/2017 40.5 37.0 - 48.5 % Final            Platelets   Date Value Ref Range Status   12/01/2017 225 150 - 350 K/uL Final   11/07/2017 253 150 - 350 K/uL Final   09/07/2017 278 150 - 350 K/uL Final            Glucose   Date Value Ref Range Status   12/01/2017 109 70 - 110 mg/dL Final   11/07/2017 99 70 - 110 mg/dL Final   09/07/2017 106 70 - 110 mg/dL Final            Sodium   Date Value Ref Range Status   12/01/2017 141 136 - 145 mmol/L Final   11/07/2017 140 136 - 145 mmol/L Final   09/07/2017 142 136 - 145 mmol/L Final            Potassium   Date Value Ref Range Status   12/01/2017 3.5 3.5 - 5.1 mmol/L Final   11/07/2017 3.5 3.5 - 5.1 mmol/L Final   09/07/2017 3.6 3.5 - 5.1 mmol/L Final            Chloride   Date Value Ref Range Status   12/01/2017 103 95 - 110 mmol/L Final   11/07/2017 104 95 - 110 mmol/L Final   09/07/2017 105 95 - 110 mmol/L Final      CO2   Date Value Ref Range Status   12/01/2017 28 23 - 29 mmol/L Final   11/07/2017 30 (H) 23 - 29 mmol/L Final   09/07/2017 25 23 - 29 mmol/L Final            BUN, Bld   Date Value Ref Range Status   12/01/2017 10 8 - 23 mg/dL Final   11/07/2017 8 8 - 23 mg/dL Final   09/07/2017 12 8 - 23 mg/dL Final            Creatinine   Date Value Ref Range Status   12/01/2017 0.9 0.5 - 1.4 mg/dL Final   11/07/2017 0.7 0.5 - 1.4 mg/dL Final   09/07/2017 0.9 0.5 - 1.4 mg/dL Final            Calcium   Date Value Ref Range Status    12/01/2017 9.5 8.7 - 10.5 mg/dL Final   11/07/2017 9.2 8.7 - 10.5 mg/dL Final   09/07/2017 9.7 8.7 - 10.5 mg/dL Final            Magnesium   Date Value Ref Range Status   12/01/2017 1.9 1.6 - 2.6 mg/dL Final   02/04/2015 1.9 1.6 - 2.6 mg/dL Final   05/02/2011 2.1 1.6 - 2.6 mg/dl Final            Phosphorus   Date Value Ref Range Status   05/02/2011 3.1 2.7 - 4.5 mg/dl Final            Alkaline Phosphatase   Date Value Ref Range Status   12/01/2017 63 55 - 135 U/L Final   11/07/2017 63 55 - 135 U/L Final   09/07/2017 66 55 - 135 U/L Final            ALT   Date Value Ref Range Status   12/01/2017 12 10 - 44 U/L Final   11/07/2017 10 10 - 44 U/L Final   09/07/2017 9 (L) 10 - 44 U/L Final            AST   Date Value Ref Range Status   12/01/2017 14 10 - 40 U/L Final   11/07/2017 11 10 - 40 U/L Final   09/07/2017 15 10 - 40 U/L Final            Images:  No new perinent imaging    Assessment and Plan    Problem List Items Addressed This Visit        Neuro    Hemichorea    Current Assessment & Plan     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     PMH of HTN, Hashimoto's as above, is currently stable.      Recommendations & Plan:  -CHANGE valium to 2 mg QHS (from BID)  -Namenda as under cognitive impairment section - awaiting delivery of titration pack.  Daughter will call clinic if not delivered soon.  -DO NOT take austedo  -F/u with psychology and psychiatry as below  -Continue outpatient therapies  -Will work with clinic to arrange for inpt rehab placement (latest update was pending insurance)         Vascular dementia    Current Assessment & Plan     Likely contributing to cognitive impairment  -Seen by Dr. Tobias, vascular neurology            Psychiatric    Mood disorder    Current Assessment & Plan     Likely contributing to deconditioning and difficulty in managing  herself at home alone.     Pt has missed recent appts with Dr. Corea, discussed importance of rescheduling and consistently attending.     -Currently on zoloft prescribed by PCP  -AVOID austedo for now (dopamine-depleting)  --> f/u with outpt psychologist Dr. Corea  -F/u with psychiatry (appt scheduled 2/7/2018)            Other    Debility    Current Assessment & Plan     Likely combination of deconditioning, hemichorea (management as above)  -Continue outpatient therapies  -Requires a cane at baseline  -MA to continue work on inpt rehab placement   -Pt is eager for assistance and would prefer inpt rehab assistance.                 Branden Rubio MD  Neurology Resident   [facility]  [address] [place] [address]

## 2018-01-17 NOTE — PROGRESS NOTES
Subjective:      Patient ID: Selma Rosado MD Jose J is a 82 y.o. female.    Chief Complaint: No chief complaint on file.    Referred by: Dee Thomson, *     Pain Scales  Best: 0/10  Worst: 10/10  Usually: 7/10  Today: 7/10    Pain disability index: 32    HPI:  Jose J is an 82yF with rheumatoid arthritis with chronic neck and back pain referred by Dr. Thomson for evaluation for facet injections vs. Botox injections for neck pain. Neck pain has been occurring for the 7-8 years. Reportedly was recommended to have surgery but patient did not have this done. Muscle pain has just started to bother over the last 3-4 months. No inciting injury or incident. She describes that pain as steady and compressive. Radiates down to anterior neck. Turning to left makes the pain worse. Improved with neck pillow. No injections. Anti-inflammatories do not help. Baclofen and gabapentin improving pain. Zoloft and hydrocodone improving symptoms. Participating in therapy for neck ROM and strengthening.    Interventional Pain History  None into neck or back    Past Medical History:   Diagnosis Date    Anemia     Arthritis     Hashimoto's disease     Hypertension     IGT (impaired glucose tolerance) 1/12/2016    Joint pain     Multinodular goiter 1/12/2016       Past Surgical History:   Procedure Laterality Date    CATARACT EXTRACTION      COLONOSCOPY N/A 9/29/2015    Procedure: COLONOSCOPY;  Surgeon: FIDELINA Sanchez MD;  Location: [address];  Service: Endoscopy;  Laterality: N/A;    JOINT REPLACEMENT      right knee    KNEE SURGERY Left 12/31/2013    TKR    left parotidectomy      mixed tumor    SALIVARY GLAND SURGERY      TONSILLECTOMY         Review of patient's allergies indicates:  No Known Allergies    Current Outpatient Prescriptions   Medication Sig Dispense Refill    amlodipine (NORVASC) 5 MG tablet TAKE 1 TABLET DAILY 90 tablet 2    aspirin (ECOTRIN) 81 MG EC tablet Take 1 tablet (81 mg total)  by mouth once daily. 360 tablet 3    baclofen (LIORESAL) 10 MG tablet Take 1 tablet (10 mg total) by mouth 3 (three) times daily. 270 tablet 2    deutetrabenazine (AUSTEDO) 9 mg Tab Take 2 tablets by mouth 2 (two) times daily. Final titrating dose - needs initially titration pack from company 120 tablet 11    ferrous sulfate 325 (65 FE) MG EC tablet Take 325 mg by mouth once daily.       gabapentin (NEURONTIN) 300 MG capsule Take 1-2 capsules (300-600 mg total) by mouth 3 (three) times daily. 540 capsule 1    hydrocodone-acetaminophen 5-325mg (NORCO) 5-325 mg per tablet Take 1 tablet by mouth every 24 hours as needed for Pain. 30 tablet 0    hydroxychloroquine (PLAQUENIL) 200 mg tablet TAKE 2 TABLETS ONCE DAILY 180 tablet 1    linaclotide (LINZESS) 145 mcg Cap capsule Take 1 capsule (145 mcg total) by mouth once daily. 30 capsule 0    memantine (NAMENDA XR) 7-14-21-28 mg C24k Follow titration instructions 7 mg x1. 14 mg x1 week. 21 mg x1 week. 28 mg x1 week. 1 each 0    memantine 28 mg CSpX Take 1 capsule (28 mg total) by mouth once daily. START after titration pack completed. 30 capsule 3    montelukast (SINGULAIR) 10 mg tablet TAKE 1 TABLET EVERY EVENING 90 tablet 2    predniSONE (DELTASONE) 5 MG tablet TAKE 2 TABLETS ONCE DAILY 180 tablet 0    sertraline (ZOLOFT) 25 MG tablet Take 1 tablet (25 mg total) by mouth once daily. 30 tablet 11    thiamine 100 MG tablet Take 1 tablet (100 mg total) by mouth once daily.      diazePAM (VALIUM) 2 MG tablet Take 1 tablet (2 mg total) by mouth 2 (two) times daily. 60 tablet 0    valACYclovir (VALTREX) 1000 MG tablet Take 1 tablet (1,000 mg total) by mouth 3 (three) times daily. 21 tablet 0     No current facility-administered medications for this visit.        Family History   Problem Relation Age of Onset    Hypertension Mother     Prostate cancer Father      prostate cancer    Breast cancer Maternal Grandmother     Lupus Neg Hx     Psoriasis Neg Hx      "Melanoma Neg Hx     Colon cancer Neg Hx        Social History     Social History    Marital status:      Spouse name: N/A    Number of children: N/A    Years of education: N/A     Occupational History    Not on file.     Social History Main Topics    Smoking status: Never Smoker    Smokeless tobacco: Never Used    Alcohol use No      Comment: very seldom     Drug use: No    Sexual activity: No      Comment: ,  age 52,      Other Topics Concern    Not on file     Social History Narrative    No narrative on file       Review of Systems   Constitution: Negative for chills, fever, malaise/fatigue, weight gain and weight loss.   HENT: Negative for ear pain and hoarse voice.    Eyes: Negative for blurred vision, pain and visual disturbance.   Cardiovascular: Negative for chest pain, dyspnea on exertion and irregular heartbeat.   Respiratory: Negative for cough, shortness of breath and wheezing.    Endocrine: Negative for cold intolerance and heat intolerance.   Hematologic/Lymphatic: Negative for adenopathy and bleeding problem. Does not bruise/bleed easily.   Skin: Negative for color change, itching and rash.   Musculoskeletal: Negative for back pain and neck pain.   Gastrointestinal: Negative for change in bowel habit, diarrhea, hematemesis, hematochezia, melena and vomiting.   Genitourinary: Negative for flank pain, frequency, hematuria and urgency.   Neurological: Negative for difficulty with concentration, dizziness, headaches, loss of balance and seizures.   Psychiatric/Behavioral: Negative for altered mental status, depression and suicidal ideas. The patient is not nervous/anxious.    Allergic/Immunologic: Negative for HIV exposure.            Objective:      Temp 98.2 °F (36.8 °C)   Ht 5' 5" (1.651 m)   Wt 71.4 kg (157 lb 6.5 oz)   LMP  (LMP Unknown)   BMI 26.19 kg/m²   GEN: No apparent distress. Affect normal.   HEENT: Normocephalic, Atraumatic; EOM intact  CV: regular rate and " rhythm  RESP: clear to auscultation bilaterally; no increased work of breathing  ABD: soft, non-tender, non-distended, normal bowel sounds  SKIN: intact, No rashes    Ortho/SPM Exam    CERVICAL SPINE AND BILATERAL UPPER EXTREMITY EXAM:   INSPECTION: No gross deformities or abnormalities noted.   RANGE OF MOTION: Diminished throughout, worse pain with extension, creptius noted, pain worse with lateral rotation and flexion to left.  STABILITY: No instability noted/appreciated.   MYOFASCIAL EXAM: tightness in cervical paraspinals and traps. Also tight in left SCM, painful in this area to palpation.  SPURLING: Negative bilaterally for radiating arm pain.   FACET LOADING: Positive bilaterally.    MUSCLE STRENGTH: 5/5 and symmetrical, bilateral upper extremities.   SENSORY EXAM: Intact to light touch, bilateral upper extremities.   DTRs: 2+ and symmetrical.    PATHOLOGICAL REFLEXES:   HODGE'S: Negative bilaterally.   CLONUS: None bilaterally.   BABINSKI: Downgoing plantar response bilaterally.    Cervical Xray 11/22/17  AP, neutral lateral, flexion lateral, and extension lateral radiographs of the cervical spine were obtained.  Note that the cervicothoracic junction is not optimally visualized.  There is 2 mm anterolisthesis of C2 on C3 which increases to 3 mm on the flexion radiographs.  The remainder of the posterior vertebral alignment is satisfactory.  Vertebral body heights are well-maintained.  There are advanced degenerative changes identified throughout the cervical spine with disc space narrowing and anterior and posterior osteophyte formation.  There is solid bony fusion of the C5 and C6 vertebral bodies.  There is mild facet arthropathy noted throughout the cervical spine.  Atherosclerotic calcification is identified in the region of the carotid arteries bilaterally.   Impression       2 mm anterolisthesis of C2 on C3 which appears to be degenerative in etiology.  This increases to 3 mm on the flexion  radiograph of the cervical spine.  Cervical spondylosis.  Atherosclerosis.           3/13/17 MRI Cervical  FINDINGS:    There is mild anterolisthesis C2 on C3 and mild retrolisthesis of C3 on C4.  There is also mild retrolisthesis of C6 on C7.  The spondylolisthesis is likely secondary to ligamentous laxity.  There is straightening of normal cervical lordosis. Vertebral body heights are well maintained without evidence of fracture or marrow signal abnormality suspicious for an infiltrative process.  There is loss of intervertebral disc space height at C5-6 and C6-7 Visualized posterior fossa, cervical cord, and upper thoracic cord demonstrate normal signal. Surrounding soft tissue structures demonstrate no significant abnormalities.      C2-3:  Posterior disc osteophyte complex and bilateral facet arthropathy.  Effacement of the anterior thecal sac, without flattening of the spinal cord.  No significant neural foraminal narrowing.    C3-4:  Posterior disc osteophyte complex and bilateral facet arthropathy, resulting in effacement of the anterior thecal sac, without flattening of the spinal cord.  There is moderate left-sided neuroforaminal narrowing.      C4-5:  Posterior disc osteophyte complex and bilateral facet arthropathy, resulting in effacement of the anterior thecal sac, without flattening of the spinal cord.  There is no significant neural foraminal narrowing.    C5-6:  Posterior disc osteophyte complex and bilateral facet arthropathy, resulting in effacement of the anterior thecal sac, without flattening of spinal cord.  No significant neural foraminal narrowing.    C6-7:  Posterior disc osteophyte complex and bilateral facet arthropathy, resulting in effacement of the anterior thecal sac, without flattening of the spinal cord.  No significant neural foraminal narrowing.    C7-T1:  No significant spinal canal stenosis.  No significant neural foraminal narrowing.   Impression          There is multilevel  degenerative changes of the cervical spine, resulting in mild spinal canal stenosis and neuroforaminal narrowing as detailed above.    Images were discussed with the patient and her daughter.  We reviewed the MRI image report of the brain and the mastoid was interpreted as unremarkable.    Assessment:     82yF with  Encounter Diagnoses   Name Primary?    Cervical dystonia     Spondylosis of cervical region without myelopathy or radiculopathy     Anterolisthesis     Muscle spasm     Myofascial pain Yes         Plan:       Diagnoses and all orders for this visit:    Myofascial pain  -     betamethasone acetate-betamethasone sodium phosphate injection 6 mg; Inject 1 mL (6 mg total) into the muscle one time.    Cervical dystonia  -     Medication Pre-Authorization    Spondylosis of cervical region without myelopathy or radiculopathy    Anterolisthesis    Muscle spasm  -     Medication Pre-Authorization    We discussed with the patient the assessment and recommendations. The following is the plan we agreed on:    1. Will perform trigger point injection into left SCM for myofascial pain. Risks and benefits discussed. Informed consent obtained. See procedure note below.  2. If trigger point injection provides limited relief, will consider Botox injections at next visit for myofascial pain and cervical dystonia. If provide continued relief, then will repeat as needed.  3. Follow-up in 1 month    Procedure: Under clean technique & after discussing with the patient trigger point injection using Bupivicaine 0.25%  5  mls & Betamethasone  0.5 Mls. 3 mLs of this was injected in the following muscle: left Sternocleidomastoid.    Sy Noel MD, PGY-2    I have personally taken the history and examined this patient and agree with the resident's note as stated above.

## 2018-01-17 NOTE — PATIENT INSTRUCTIONS
-Valium 2 mg at night only  -START namenda -> please call/message if titration pack does not arrive  -DO NOT take austedo  -F/u with psychology and psychiatry  -Physical/speech therapies  -Continue to pursue inpatient rehab

## 2018-01-17 NOTE — PROGRESS NOTES
Chart reviewed. Telephonic follow-up with patient's daughter, Rosy Rosado.  She said she and patient visited Dr. Rubio today and were just leaving the appointment. Ms. Rosado said they have decided not to pursue IP Rehabilitation placement.  She said patient will remain at home with sitters in place.  PCP and OPCM Domonique Baez advised of the decision. Ms. Rosado said she will be leaving town to go back to [place] tomorrow.  Inclement weather emergency preparedness discussed as follows:    [x] Please be sure to have a supply of water, non-perishable food items, flashlights and batteries with you in your house.   [x] Please be sure you bring all of your medications with you. Bring at least a five day supply. It is best to bring your medication bottles, in case you are displaced for a longer period of time, therefore you can get your medication refilled from your temporary location.   [x] Please bring sure to bring any DME that you may need. This includes walkers, wheelchairs, shower chairs, nebulizer machine, etc.   [] If you are a diabetic- be sure to bring your glucometer and all glucometer monitoring supplies.   [x] If you have high blood pressure- be sure to bring your blood pressure cuff so you can continue to monitor.   [] If you have CHF-be sure to bring your scale so you can continue to monitor.  [] If on PEG feeding- be sure to bring tube feedings and feeding supplies.  [] If on oxygen- be sure to bring all of your oxygen supplies: Cannula, portable tanks, concentrator, etc. Also contact you Oxygen Supply Company to find out the nearest location of an oxygen supply company to where you will be located. ([employer]: [phone], [facility] [place]: [phone], [facility]:[phone], [employer]: [phone]).   [] If you receive hemodialysis- you should already have a plan in place with your dialysis center. If you do not know where you need to evacuate to in order to be close to a dialysis  center- reach out to your dialysis center for further direction. ([facility] service line: [phone], [facility] service line [phone])  [] If receiving treatment at an infusion center- please contact the infusion center to find out which infusion center they have a contract with. Also ask your infusion center for location of the infusion center they are in contract with, so if need be you can evacuate to that area.   Office of Emergency Preparedness Phone number: [x] [facility]: [phone]    Plan:   Monitor progress to ensure all supports needed are in place.

## 2018-01-18 ENCOUNTER — CLINICAL SUPPORT (OUTPATIENT)
Dept: REHABILITATION | Facility: HOSPITAL | Age: 83
End: 2018-01-18
Attending: PHYSICAL MEDICINE & REHABILITATION
Payer: MEDICARE

## 2018-01-18 DIAGNOSIS — R47.1 DYSARTHRIA: ICD-10-CM

## 2018-01-18 DIAGNOSIS — R41.841 COGNITIVE COMMUNICATION DEFICIT: ICD-10-CM

## 2018-01-18 DIAGNOSIS — Z74.09 IMPAIRED FUNCTIONAL MOBILITY, BALANCE, GAIT, AND ENDURANCE: ICD-10-CM

## 2018-01-18 PROCEDURE — 97127 HC THERAPEUTIC INTVTN, COGN FUNCTION - ST: CPT | Mod: PO

## 2018-01-18 PROCEDURE — 97110 THERAPEUTIC EXERCISES: CPT | Mod: PO

## 2018-01-18 NOTE — PROGRESS NOTES
"                                                    Physical Therapy Progress Note     Name: Selma Alonzo Lux MD  Clinic Number: [id number]  Diagnosis:   Encounter Diagnosis   Name Primary?    Impaired functional mobility, balance, gait, and endurance      Physician: Dee Thomson, *  Treatment Orders: PT Eval and Treat  Past Medical History:   Diagnosis Date    Anemia     Arthritis     Hashimoto's disease     Hypertension     IGT (impaired glucose tolerance) 1/12/2016    Joint pain     Multinodular goiter 1/12/2016     Re-Evaluation Date: 12/12/17  Total 2017 Visit #: 6  2018 Visit #: 4  Plan of care expiration: 12/5/2017  Extended POC Expiration: 01/30/17  Precautions: universal, visual impairment, impaired memory      G codes 3/10             Subjective   Pt reports: that she's doing fine today   Pain Scale:  3/10 L sided neck pain    Objective     Patient received individual therapy with activities as follows:     Therapeutic exercises to increase strength and endurance x  45 min including;      X 10 min on Sci Fit recumbent stepper .  Level 5.0 for improved CV endurance.     30" x 2 B UT stretch seated  30" x 2 B levator scap strectch seated  X 10 AROM lower cervical flexion <> extension within pain free range  X 5 AROM cervical rotation to R and to L    2 X 10, c/ 3" hold seated scap retractions c/ orange sports cord  3 x 10 B SLRs, AROM, RLE 1.0# ankle cuff, LLE no weight  3 x 10 bridges c/ 3" hold    2 x 10 side stepping R<>L over large green bolster in // bars to improve upright posture and hip AB strength, BUE support, CGA  X 10 forward steps over large green bolster, each LE, to improve step length, hip strength, and upright posture, BUE support, CGA   Standing rest breaks between standing activities    Gait: Pt ambulated  ~ 170ft in/out of gym with SC and close SBA.  Pt with increased L lateral steps out, but no loss of balance. Increased time to complete      Written Home Exercises: " Continue HEP   Supine Quad sets, SLR, bridges, hip abd/add, Heel slides  Pt demo good understanding of the education provided. Selma demonstrated good return demonstration of activities.     Education provided re: POC, HEP  No spiritual or educational barriers to learning provided    Pt has no cultural, educational or language barriers to learning provided.    Assessment   Selma tolerated therapy session well this morning without difficulty. Pt demonstrates improved carry over of exercises this morning and required less VCs for proper technique. However, mild confusion noted throughout therapy session. Pt required brief standing rest breaks during standing activity in // bars but able to perform activity safely with decreased (A) from PT. Able to progress resistance for RLE SLR without difficulty. Initiated gentle cervical AROM without difficulty. Mobility remains limited by poor posture, decreased strength, and decreased cognition. Cont. POC to progress as tolerated.     Pt rehab potential is Good. Pt will benefit from continuing skilled outpatient physical therapy to address the deficits listed below in the problem list, provide pt/family education and to maximize pt's level of independence in the home and community environment.      History  Co-morbidities and personal factors that may impact the plan of care Examination  Body Structures and Functions, activity limitations and participation restrictions that may impact the plan of care. Medical necessity is demonstrated by the following impairments. Clinical Presentation Decision Making/ Complexity Score   1. OA of multiple joints  2. LBP  3. Visual deficits  4. Cognitive deficits  5. B rotator cuff repair  6. Vascular dementia 1. Decreased balance  2. Gait deficits  3. Increased fall risk  4. Decreased LE strength  5. Impaired endurance  6. Postural deficits evolving-unpredictable symptom presentation, cognitive deficits     high high moderate moderate          Pt's spiritual, cultural and educational needs considered and pt agreeable to plan of care and goals as stated below:      GOALS:   Short term goals: 4 weeks, pt agrees to goals set.  7. Pt to report performing HEP daily to increased IND.  8. Pt to demonstrate nadiya hip flexion MMT score to 4/5 to improve functional mobility.   9. Pt to demonstrate L hip abduction MMT score to 4/5 to improve functional mobility.   10. Pt to improve L knee extension MMT score to 4/5 to improve functional mobility  11. Pt to improve L knee flexion MMT score to 4/5 to improve functional mobility  12. Pt to improve chair rise score to at least 8 with no hands for improved endurance with functional mobility  13. Pt to improve MCTSIB condition 4 score to at least 15 seconds for improved balance and decreased fall risk.   14. Pt to improve TUG score with SPC to no more than 16 seconds for improved safety with ambulation  15. Pt to improve SSWS to at least 0.75 m/sec for improved safety with community ambulation.     Long term goals: 8 weeks, pt agrees to goals set  11. Pt to demonstrate more upright standing/seated head/shoulder posture to improve balance.  12. Pt to improve SSWS to at least 0.86m/sec for improved safety with community ambulation.   13. Pt to demonstrate nadiya hip flexion strength of 4+/5 to improve functional mobility.  14. Pt to demonstrate L hip abduction strength to 4+/5 to improve functional mobility.  16. Pt to improve L knee extension MMT score to 4/+5 to improve functional mobility  17. Pt to improve L knee flexion MMT score to 4+/5 to improve functional mobility  18. Pt to improve chair rise score to at least 9 with no hands for improved endurance with functional mobility  19. Pt to improve MCTSIB condition 4 score to at least 20 seconds for improved balance and decreased fall risk.   20. Pt to improve TUG score with SPC to no more than 14 seconds for improved safety with ambulation  21. Pt to improve FOTO  score to at least 60% for improved self concept with functional mobility.     Plan   Outpatient physical therapy 2 times weekly to include: Pt Education, HEP, therapeutic exercises, neuromuscular re-education, therapeutic activities, manual therapy, joint mobilizations, aquatic therapy and modalities PRN to achieve established goals. Pt may be seen by PTA as part of the rehabilitation team.      Cont PT for  8 weeks.     Kayleen Bañuelos, PT   01/18/2018

## 2018-01-18 NOTE — TELEPHONE ENCOUNTER
----- Message from XUAN Gray, CCC-SLP sent at 1/18/2018 12:03 PM CST -----  Regarding: referral  Dr. Giang,    Dr. Lux's voice is strained and hoarse. I think she should see an ENT. Please send a referral if you agree.     Thanks,  Rupal

## 2018-01-18 NOTE — PROGRESS NOTES
OUTPATIENT NEUROLOGICAL REHABILITATION  SPEECH THERAPY PROGRESS NOTE    Date:  1/18/2018     Start Time:  1115  Stop Time:  1145    Subjective/History  Onset Date:  Approximately 1 to 2 years ago  Primary Diagnosis:  Cognitive Impairment, Hemichorea, Debility  Treatment Diagnosis:    1. Cognitive communication deficit      2. Dysarthria         Referring Provider:  Dr. 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     Past Medical History:   Past Medical History:   Diagnosis Date    Anemia     Arthritis     Hashimoto's disease     Hypertension     IGT (impaired glucose tolerance) 1/12/2016    Joint pain     Multinodular goiter 1/12/2016     Precautions:  Memory loss        TIMED  Procedure Min.    Cognitive Therapy  30         UNTIMED  Procedure Min.                  Total Minutes: 30  Total Timed Units: 2  Total Untimed Units: 0  Charges Billed/# of units: 2    Visit #: 9  Date of Evaluation:  12/19/17  Plan of Care Expiration:    02/16/18  Extended POC:    G-CODE   9/10    eval   CK Memory   update          Progress/Current Status    Subjective:     Pain: 6 /10  Pt complained of neck pain. She went to pain management Dr. Gonzalez yesterday for injection.    Objective:     Short Term Goals: (6 weeks) Current Progress:   1. Dr. Lux will complete short term memory tasks (immediate) with 90% acc given min cues over 3 consecutive sessions.    - Pt repeated words in alphabetical order: 3 words - 100% acc ind'ly; 4 words - 60% acc ind'ly, 100% acc given 2 reps     2. Dr. Lux will complete short term memory tasks (delayed) with 90% acc given min cues over 3 consecutive sessions. - Pt recalled details from her doctor appointment yesterday with 100% acc       3. Dr. Lux  will complete mental flexibility/manipulation tasks with 90% acc given min cues over 3 consecutive sessions. - Pt repeated words in alphabetical order: 3 words - 100% acc ind'ly; 4 words - 60% acc ind'ly, 100% acc given 2 reps   4. Dr. Lux will problem solving, reasoning and thought organization tasks with with 90% acc given min cues over 3 consecutive sessions. - Pt identified problems and selected best solutions to problems with 90% acc ind'ly, 100% acc given cues.         5. Dr. Lux will list 15 to 20 items in concrete categories within one minute over 3 consecutive sessions. - Not formally addressed      6. Dr. Lux will utilize motor speech strategies (loud, over articulated speech, good breath support) in speech tasks with 90% accuracy given min cues over 3 consecutive sessions. - Pt's speech was 100% intelligible today. Goal met x 3           Assessment:   Word finding difficulty noted but when given time, Dr. Lux expressed herself appropriately. Her vocal quality was somewhat strained and hoarse. Recommend ENT consult to assess vocal cord function.  Current goals remain appropriate. Goals to be updated as necessary.      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 She presents with impaired short term memory, word finding, problem solving, thought organization, executive function, and mental flexibility skills. Pt would benefit from continued skilled ST. Prognosis for improvement remains Good     Patient Education/Response:   Purpose of the ENT consult. Word finding strategies.  She verbalized understanding.     Home Program: n/a    Plans and Goals:     Continue POC with focus on her cognitive-communicative deficits       Other Recommendations: ENT consult     XUAN Gonzalez, CCC-SLP  Speech-Language Pathologist  [facility]  Neurological Rehabilitation

## 2018-01-19 ENCOUNTER — OUTPATIENT CASE MANAGEMENT (OUTPATIENT)
Dept: ADMINISTRATIVE | Facility: OTHER | Age: 83
End: 2018-01-19

## 2018-01-19 NOTE — PROGRESS NOTES
1/19/18  Care coordination continued-- late entry from 1/17/19. DHARMESH PalmerW spoke with daughter, Rosy who was giving up on IP Rehab placement. Instead plans for  pt staying at home with sitter in place and continued OP PT/ST.     Plan for next encounter  Check with pt status

## 2018-01-22 ENCOUNTER — OUTPATIENT CASE MANAGEMENT (OUTPATIENT)
Dept: ADMINISTRATIVE | Facility: OTHER | Age: 83
End: 2018-01-22

## 2018-01-22 RX ORDER — DIAZEPAM 2 MG/1
2 TABLET ORAL NIGHTLY
Qty: 30 TABLET | Refills: 3 | Status: ON HOLD | OUTPATIENT
Start: 2018-01-22 | End: 2018-05-01 | Stop reason: HOSPADM

## 2018-01-22 NOTE — PROGRESS NOTES
1/22/18  CM f/u to update plan of care. CM spoke telephonically with Dr. Lux to check on her status now that her daughter, Rosy departed for her home in [facility]. Dr. Lux answers phone and responds appropriately and in animated tone. She reports her dgt left on Sat 1/20 and that their friend, Joanna is present to assist in pt's care. She has help coming in to assist pt with her bath-- using the old-fashioned tub, someone to fill her meds and her friend for supervision of needs and transportation to OP PT/ST.   CM emphasized pt safety in bathroom. Dr. Lux in response to not having a rubber kilo or non skid tub surface states she will get a kilo and doesn't know why she doesn't have one for her tub.   All communication has been through her daughter, Rosy. Dr. Lux questions who this CM is and CM's role. Reacquainted Dr Lux with the purpose of this nurse CM who works with Aracely Quintana LCSW. She asks how long we will be checking on her. CM explained that it is short-term.This CM to f/u next week but likely thereafter will discontinue calls to pt. This CM can not speak for Aracely AN however. CM explained that should pt have future needs for OPCM, we can easily open her case again to offer assistance. Dr. Lux states her understanding, asking very appropriate questions and recording contact number for this CM.       Plan for next encounter  Check on pt status-- and safety  Check if she has a rubber kilo for tub--- speak with friend/c/g if not.   Plan to close RN case

## 2018-01-22 NOTE — ASSESSMENT & PLAN NOTE
Likely contributing to deconditioning and difficulty in managing herself at home alone.     Pt has missed recent appts with Dr. Corea, discussed importance of rescheduling and consistently attending.     -Currently on zoloft prescribed by PCP  -AVOID austedo for now (dopamine-depleting)  --> f/u with outpt psychologist Dr. Corea  -F/u with psychiatry (appt scheduled 2/7/2018)

## 2018-01-22 NOTE — ASSESSMENT & PLAN NOTE
Likely combination of deconditioning, hemichorea (management as above)  -Continue outpatient therapies  -Requires a cane at baseline  -MA to continue work on inpt rehab placement   -Pt is eager for assistance and would prefer inpt rehab assistance.

## 2018-01-22 NOTE — ASSESSMENT & PLAN NOTE
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     PMH of HTN, Hashimoto's as above, is currently stable.      Recommendations & Plan:  -CHANGE valium to 2 mg QHS (from BID)  -Namenda as under cognitive impairment section - awaiting delivery of titration pack.  Daughter will call clinic if not delivered soon.  -DO NOT take austedo  -F/u with psychology and psychiatry as below  -Continue outpatient therapies  -Will work with clinic to arrange for inpt rehab placement (latest update was pending insurance)

## 2018-01-23 ENCOUNTER — CLINICAL SUPPORT (OUTPATIENT)
Dept: REHABILITATION | Facility: HOSPITAL | Age: 83
End: 2018-01-23
Attending: PHYSICAL MEDICINE & REHABILITATION
Payer: MEDICARE

## 2018-01-23 ENCOUNTER — TELEPHONE (OUTPATIENT)
Dept: INTERNAL MEDICINE | Facility: CLINIC | Age: 83
End: 2018-01-23

## 2018-01-23 DIAGNOSIS — R47.1 DYSARTHRIA: ICD-10-CM

## 2018-01-23 DIAGNOSIS — Z74.09 IMPAIRED FUNCTIONAL MOBILITY, BALANCE, GAIT, AND ENDURANCE: ICD-10-CM

## 2018-01-23 DIAGNOSIS — I51.89 DIASTOLIC DYSFUNCTION: ICD-10-CM

## 2018-01-23 DIAGNOSIS — I63.9 CEREBROVASCULAR ACCIDENT (CVA), UNSPECIFIED MECHANISM: Primary | ICD-10-CM

## 2018-01-23 DIAGNOSIS — R41.841 COGNITIVE COMMUNICATION DEFICIT: ICD-10-CM

## 2018-01-23 PROCEDURE — G9169 MEMORY GOAL STATUS: HCPCS | Mod: CI,PO

## 2018-01-23 PROCEDURE — G9168 MEMORY CURRENT STATUS: HCPCS | Mod: CJ,PO

## 2018-01-23 PROCEDURE — 97127 HC THERAPEUTIC INTVTN, COGN FUNCTION - ST: CPT | Mod: PO

## 2018-01-23 PROCEDURE — 97110 THERAPEUTIC EXERCISES: CPT | Mod: PO

## 2018-01-23 NOTE — PROGRESS NOTES
OUTPATIENT NEUROLOGICAL REHABILITATION  SPEECH THERAPY PROGRESS NOTE    Date:  1/23/2018     Start Time:  1115  Stop Time:  1200    Subjective/History  Onset Date:  Approximately 1 to 2 years ago  Primary Diagnosis:  Cognitive Impairment, Hemichorea, Debility  Treatment Diagnosis:    1. Cognitive communication deficit      2. Dysarthria         Referring Provider:  Dr. 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     Past Medical History:   Past Medical History:   Diagnosis Date    Anemia     Arthritis     Hashimoto's disease     Hypertension     IGT (impaired glucose tolerance) 1/12/2016    Joint pain     Multinodular goiter 1/12/2016     Precautions:  Memory loss        TIMED  Procedure Min.    Cognitive Therapy  45         UNTIMED  Procedure Min.                  Total Minutes: 45  Total Timed Units: 3  Total Untimed Units: 0  Charges Billed/# of units: 3    Visit #: 10  Date of Evaluation:  12/19/17  Plan of Care Expiration:    02/16/18  Extended POC:    G-CODE   10/10    eval   CK Memory   Update  1/23/18          Progress/Current Status    Subjective:     Pain: 6 /10  Pt scheduled to see ENT this week.    Objective:     Short Term Goals: (6 weeks) Current Progress:   1. Dr. Lux will complete short term memory tasks (immediate) with 90% acc given min cues over 3 consecutive sessions.    - Pt recalled general information from a paragraph read to her with - 100% acc ind'ly  - Pt recalled specific information from a paragraph read to her with - 85% acc ind'ly  - Overall Average: 82% acc IND'ly, 100% acc given cues Goal met/revise 1/23/18   1a. Dr. Lux will complete short term memory tasks (immediate) with 90% acc given IND'ly     2. Dr. Lux will complete short  term memory tasks (delayed) with 90% acc given min cues over 3 consecutive sessions. - Pt recalled general information from a paragraph read to her after 9 minutes with 100% acc ind'ly  -  Overall Average: 82% acc IND'ly, 100% acc given cues Goal revised 1/23/18     2a. Dr. Lux will complete short term memory tasks (delayed) with 90% acc given min cues.    3. Dr. Lux will complete mental flexibility/manipulation tasks with 90% acc given min cues over 3 consecutive sessions. - Pt unscrambled sentences: 4 words - 80% acc ind'ly, 100% acc given 1 cue; 5 words - 80% acc ind'ly, 100% acc given rep.  - Overall Average: 80% acc IND'ly, 100% acc given cues Goal met/revise 1/23/18   3a. Dr. Lux will complete mental flexibility/manipulation tasks with 90% acc IND'ly.    4. Dr. Lux will problem solving, reasoning and thought organization tasks with with 90% acc given min cues over 3 consecutive sessions. - Not formally addressed today  -Overall Average: 91% acc IND'ly, 100% acc given cues Goal met/revise 1/23/18      4a.  Dr. Lux will complex problem solving, reasoning and thought organization tasks with with 90% acc IND'ly    5. Dr. Lux will list 15 to 20 items in concrete categories within one minute over 3 consecutive sessions. - Cities: 9 ind'ly, 11 given cues  - Female names: 14   - Overall Average: 12 items   Goal not met/Revise   5. Dr. Lux will list 15 to 20 items in concrete categories.     6. Dr. Lux will utilize motor speech strategies (loud, over articulated speech, good breath support) in speech tasks with 90% accuracy given min cues over 3 consecutive sessions. - Pt's speech was 100% intelligible today. Goal met x 3/ Discontinue           Assessment:   Pt is making very good progress towards goals. Some of her goals were met and upgraded. Improved problem solving and reasoning. Word finding difficulty is mild but when given time, Dr. Lux expresses herself appropriately.     Dr. Lux presents  with moderate impaired cognitive-communicative skills. She presents with impaired short term memory, word finding, problem solving, thought organization, executive function, and mental flexibility skills. Pt would benefit from continued skilled ST. Prognosis for improvement remains Good     Functional Communication Measure (FCM):   Severity Modifier for Medicare G-Code:  Memory  UPDATED 1/23/18  Current status: FCM:  Level 5   - CJ  at least 20% < 40% impaired, limited or restricted  Projected status:  FCM: Level 6   - CI at least 1% < 20% impaired, limited or restricted     Other SLP Functional Limitation (Problem Solving) UPDATED 1/23/18  Current status: FCM: Level 5   - CJ at least 20% < 40% impaired, limited or restricted  Projected status:  FCM:  Level 6   - CI at least 1% < 20% impaired, limited or restricted     Patient Education/Response:   Memory strategies discussed.  She verbalized understanding.     Home Program: n/a    Plans and Goals:     Continue POC with focus on her cognitive-communicative deficits     Other Recommendations: ENT consult     XUAN Gonzalez, CCC-SLP  Speech-Language Pathologist  [facility]  Neurological Rehabilitation

## 2018-01-23 NOTE — PROGRESS NOTES
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 I met her a couple years ago and regarding muscle tension dysphonia.    More recently, she had a CVA, with symptoms including cognitive speech disturbances/aphasia. She has been making significant progress on these fronts under Ms. Yañez's direction. She has also begun working with a vocal  in hopes of recovering some of her singing capabilities. The patient does not herself perceive any deterioration of her voice since our last evaluation, whereas she has noted her speech difficulties which are improving.    Voice Handicap Index - 10 (VHI-10) score is 5.  Reflux symptom Index (RSI) score is 16.  Eating Assessment Tool - 10 (EAT-10) score is 1.    The patient's medications, allergies, past medical, surgical, social and family histories were reviewed and updated as appropriate.    A detailed review of systems was obtained with pertinent positives as per the above HPI, and otherwise negative.      Objective:     VS reviewed    Constitutional: comfortable, well dressed  Psychiatric: appropriate affect  Respiratory: comfortably breathing, symmetric chest rise, no stridor  Voice: mild roughness and strain; stable  Head: normocephalic  Eyes: conjunctivae and sclerae clear  Indirect laryngoscopy is limited due to gag    Procedure  Flexible Laryngeal Videostroboscopy (27542): Laryngeal videostroboscopy is indicated to assess laryngeal vibratory biomechanics and vocal fold oscillation, which cannot be assessed with a plain light examination. This was carried out transnasally with a distal chip videoendoscope. After verbal consent was obtained, the patient was positioned and the nose was topically decongested with 1% phenylephrine and topically anesthetized with 4% lidocaine. The endoscope was passed  through the most patent nasal cavity and positioned to image the nasopharynx, larynx, and hypopharynx in detail. The following features were examined: nasopharyngeal, laryngeal, hypopharyngeal masses; velopharyngeal strength, closure, and symmetry of motion; vocal fold range and symmetry of motion; laryngeal mucosal edema, erythema, inflammation, and hydration; salivary pooling; and gross laryngeal sensation. During phonation, the vocal folds were assessed for glottal closure; mucosal wave; vocal fold lesions; vibratory periodicity, amplitude, and phase symmetry; and vertical height match. The equipment was removed. The patient tolerated the procedure well without complication. 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 Her voice and laryngeal examination are essentially stable since my last evaluation.     Plan:     Reassurance was provided. I recommended adherence to her SLP treatment plan. She will follow up with me in the future on an as-needed basis.    All questions were answered, and the patient is in agreement with the plan.    Satya Quintanilla M.D.  [facility]  Department of Otorhinolaryngology and Communication Sciences

## 2018-01-23 NOTE — PROGRESS NOTES
"                                                    Physical Therapy Progress Note     Name: Selma Alonzo Lux MD  Clinic Number: [id number]  Diagnosis:   Encounter Diagnosis   Name Primary?    Impaired functional mobility, balance, gait, and endurance      Physician: Dee Thomson, *  Treatment Orders: PT Eval and Treat  Past Medical History:   Diagnosis Date    Anemia     Arthritis     Hashimoto's disease     Hypertension     IGT (impaired glucose tolerance) 1/12/2016    Joint pain     Multinodular goiter 1/12/2016     Re-Evaluation Date: 12/12/17  Total 2017 Visit #: 6  2018 Visit #: 5  Plan of care expiration: 12/5/2017  Extended POC Expiration: 01/30/18  Precautions: universal, visual impairment, impaired memory      G codes 4/10             Subjective   Pt reports: " My IBS is acting up today, so I'm not feeling the best."   Pain Scale:  3/10 L sided neck pain    Objective     Patient received individual therapy with activities as follows:     Therapeutic exercises to increase strength and endurance x  45 min including;      X 10 min on Nu Step recumbent stepper .  Level 5.0 for improved CV endurance.     30" x 2 B UT stretch seated  30" x 2 B levator scap strectch seated  X 10 AROM lower cervical flexion <> extension within pain free range  X 5 AROM cervical rotation to R and to L    2 X 10, c/ 3" hold seated scap retractions c/ orange sports cord  3 x 10 B SLRs, AROM, RLE 1.0# ankle cuff, LLE no weight  3 x 10 bridges c/ 3" hold- NP today 2* irritated stomach from IBS.        Gait: Pt ambulated  ~ 170ft in/out of gym with SC and close SBA.  Pt with increased L lateral steps out, but no loss of balance. Increased time to complete      Written Home Exercises: Continue HEP   Supine Quad sets, SLR, bridges, hip abd/add, Heel slides  Pt demo good understanding of the education provided. Selma demonstrated good return demonstration of activities.     Education provided re: POC, HEP  No spiritual " or educational barriers to learning provided    Pt has no cultural, educational or language barriers to learning provided.    Assessment   Selma tolerated therapy session fairly today and only able to perform limited about of activities 2* irritated stomach from her IBS.  Pt did not start any new activities and was unable to perform bridges or many standing activities.   Cont. POC to progress as tolerated.     Pt rehab potential is Good. Pt will benefit from continuing skilled outpatient physical therapy to address the deficits listed below in the problem list, provide pt/family education and to maximize pt's level of independence in the home and community environment.      History  Co-morbidities and personal factors that may impact the plan of care Examination  Body Structures and Functions, activity limitations and participation restrictions that may impact the plan of care. Medical necessity is demonstrated by the following impairments. Clinical Presentation Decision Making/ Complexity Score   1. OA of multiple joints  2. LBP  3. Visual deficits  4. Cognitive deficits  5. B rotator cuff repair  6. Vascular dementia 1. Decreased balance  2. Gait deficits  3. Increased fall risk  4. Decreased LE strength  5. Impaired endurance  6. Postural deficits evolving-unpredictable symptom presentation, cognitive deficits     high high moderate moderate         Pt's spiritual, cultural and educational needs considered and pt agreeable to plan of care and goals as stated below:      GOALS:   Short term goals: 4 weeks, pt agrees to goals set.  7. Pt to report performing HEP daily to increased IND.  8. Pt to demonstrate nadiya hip flexion MMT score to 4/5 to improve functional mobility.   9. Pt to demonstrate L hip abduction MMT score to 4/5 to improve functional mobility.   10. Pt to improve L knee extension MMT score to 4/5 to improve functional mobility  11. Pt to improve L knee flexion MMT score to 4/5 to improve functional  mobility  12. Pt to improve chair rise score to at least 8 with no hands for improved endurance with functional mobility  13. Pt to improve MCTSIB condition 4 score to at least 15 seconds for improved balance and decreased fall risk.   14. Pt to improve TUG score with SPC to no more than 16 seconds for improved safety with ambulation  15. Pt to improve SSWS to at least 0.75 m/sec for improved safety with community ambulation.     Long term goals: 8 weeks, pt agrees to goals set  11. Pt to demonstrate more upright standing/seated head/shoulder posture to improve balance.  12. Pt to improve SSWS to at least 0.86m/sec for improved safety with community ambulation.   13. Pt to demonstrate nadiya hip flexion strength of 4+/5 to improve functional mobility.  14. Pt to demonstrate L hip abduction strength to 4+/5 to improve functional mobility.  16. Pt to improve L knee extension MMT score to 4/+5 to improve functional mobility  17. Pt to improve L knee flexion MMT score to 4+/5 to improve functional mobility  18. Pt to improve chair rise score to at least 9 with no hands for improved endurance with functional mobility  19. Pt to improve MCTSIB condition 4 score to at least 20 seconds for improved balance and decreased fall risk.   20. Pt to improve TUG score with SPC to no more than 14 seconds for improved safety with ambulation  21. Pt to improve FOTO score to at least 60% for improved self concept with functional mobility.     Plan   Outpatient physical therapy 2 times weekly to include: Pt Education, HEP, therapeutic exercises, neuromuscular re-education, therapeutic activities, manual therapy, joint mobilizations, aquatic therapy and modalities PRN to achieve established goals. Pt may be seen by PTA as part of the rehabilitation team.      Cont PT for  8 weeks.     Katerine Mchugh, PTA   01/23/2018

## 2018-01-24 ENCOUNTER — OFFICE VISIT (OUTPATIENT)
Dept: OTOLARYNGOLOGY | Facility: CLINIC | Age: 83
End: 2018-01-24
Payer: MEDICARE

## 2018-01-24 VITALS
DIASTOLIC BLOOD PRESSURE: 76 MMHG | SYSTOLIC BLOOD PRESSURE: 133 MMHG | HEART RATE: 84 BPM | WEIGHT: 157.19 LBS | BODY MASS INDEX: 26.16 KG/M2 | TEMPERATURE: 98 F

## 2018-01-24 DIAGNOSIS — R47.9 SPEECH DISTURBANCE, UNSPECIFIED TYPE: ICD-10-CM

## 2018-01-24 DIAGNOSIS — R49.9 VOICE DISTURBANCE: Primary | ICD-10-CM

## 2018-01-24 PROCEDURE — 99999 PR PBB SHADOW E&M-EST. PATIENT-LVL III: CPT | Mod: PBBFAC,,, | Performed by: OTOLARYNGOLOGY

## 2018-01-24 PROCEDURE — 31579 LARYNGOSCOPY TELESCOPIC: CPT | Mod: S$PBB,,, | Performed by: OTOLARYNGOLOGY

## 2018-01-24 PROCEDURE — 31579 LARYNGOSCOPY TELESCOPIC: CPT | Mod: PBBFAC | Performed by: OTOLARYNGOLOGY

## 2018-01-24 PROCEDURE — 99213 OFFICE O/P EST LOW 20 MIN: CPT | Mod: PBBFAC | Performed by: OTOLARYNGOLOGY

## 2018-01-24 PROCEDURE — 99213 OFFICE O/P EST LOW 20 MIN: CPT | Mod: 25,S$PBB,, | Performed by: OTOLARYNGOLOGY

## 2018-01-24 RX ORDER — CLOPIDOGREL BISULFATE 75 MG/1
75 TABLET ORAL DAILY
Qty: 90 TABLET | Refills: 1 | Status: SHIPPED | OUTPATIENT
Start: 2018-01-24 | End: 2018-02-09 | Stop reason: SDUPTHER

## 2018-01-24 NOTE — TELEPHONE ENCOUNTER
Dr. Jeffries rec'd plavix instead of asa, patient has no known bleeding issues, also will start on fish oil and schedule w/ him. Patient advised.

## 2018-01-24 NOTE — TELEPHONE ENCOUNTER
Finally reached patient after multiple attempts.    Called patient and discussed labs and or test results. Patient expressed understanding and had the opportunity to ask questions. Any questions were answered. See meds, orders, follow up and instructions sections of encounter.    Specific issues include:  Discussed diastolic dysfunction and atrial enlargement.  Mild carotid ASCVD      Explained that she has seen many providers recently and the value/risk of a statin may be unclear. Her pressures have run well recently. Consider a cardiology consult and consider statin - she has an appointment in 2 weeks and we will discuss and plan then.

## 2018-01-25 ENCOUNTER — CLINICAL SUPPORT (OUTPATIENT)
Dept: REHABILITATION | Facility: HOSPITAL | Age: 83
End: 2018-01-25
Attending: PHYSICAL MEDICINE & REHABILITATION
Payer: MEDICARE

## 2018-01-25 DIAGNOSIS — Z74.09 IMPAIRED FUNCTIONAL MOBILITY, BALANCE, GAIT, AND ENDURANCE: ICD-10-CM

## 2018-01-25 DIAGNOSIS — R41.841 COGNITIVE COMMUNICATION DEFICIT: ICD-10-CM

## 2018-01-25 DIAGNOSIS — R47.1 DYSARTHRIA: ICD-10-CM

## 2018-01-25 PROCEDURE — 97110 THERAPEUTIC EXERCISES: CPT | Mod: PO

## 2018-01-25 PROCEDURE — 97127 HC THERAPEUTIC INTVTN, COGN FUNCTION - ST: CPT | Mod: PO

## 2018-01-25 PROCEDURE — 97112 NEUROMUSCULAR REEDUCATION: CPT | Mod: PO

## 2018-01-25 NOTE — PROGRESS NOTES
"                                                    Physical Therapy Progress Note     Name: Selma Lux MD  Clinic Number: [id number]  Diagnosis:   Encounter Diagnosis   Name Primary?    Impaired functional mobility, balance, gait, and endurance      Physician: Dee Thomson, *  Treatment Orders: PT Eval and Treat  Past Medical History:   Diagnosis Date    Anemia     Arthritis     Hashimoto's disease     Hypertension     IGT (impaired glucose tolerance) 1/12/2016    Joint pain     Multinodular goiter 1/12/2016     Re-Evaluation Date: 12/12/17  Total 2017 Visit #: 6  2018 Visit #: 6  Plan of care expiration: 12/5/2017  Extended POC Expiration: 01/30/18  Precautions: universal, visual impairment, impaired memory      G codes 5/10             Subjective   Pt reports: " I had a little bout of diarrhea this morning and I almost cancelled.  I'm feeling better now, but if I have to run that is why."   Pain Scale:  3/10 L sided neck pain    Objective     Patient received individual therapy with activities as follows:     Therapeutic exercises to increase strength and endurance x 25  min including;      X 10 min on Nu Step recumbent stepper .  Level 5.0 for improved CV endurance.     30" x 2 B UT stretch seated  30" x 2 B levator scap strectch seated  X 10 AROM lower cervical flexion <> extension within pain free range  X 10 AROM cervical rotation to R and to L  Pt had to use the rest room during tx session     Neuromuscular re-education to improve balance and coordination x 10 min including:   4 x 30 sec of tandem stance in // bars with CGA   X 30 reps of B LE heel raises w/o UE support and CGA  X 15 reps of mini squats with 2 UE support and SBA    Gait: Pt ambulated  ~ 170ft in/out of gym with SC and close SBA.  Pt with increased L lateral steps out, but no loss of balance. Increased time to complete      Written Home Exercises: Continue HEP   Supine Quad sets, SLR, bridges, hip abd/add, Heel " slides  Pt demo good understanding of the education provided. Selma demonstrated good return demonstration of activities.     Education provided re: POC, HEP  No spiritual or educational barriers to learning provided    Pt has no cultural, educational or language barriers to learning provided.    Assessment   Selma tolerated therapy session well, with some limitations 2* bathroom break.  Pt was able to perform heel raises with out UE support and had improved step length and balance with gait around the gym.   Cont. POC to progress as tolerated.     Pt rehab potential is Good. Pt will benefit from continuing skilled outpatient physical therapy to address the deficits listed below in the problem list, provide pt/family education and to maximize pt's level of independence in the home and community environment.      History  Co-morbidities and personal factors that may impact the plan of care Examination  Body Structures and Functions, activity limitations and participation restrictions that may impact the plan of care. Medical necessity is demonstrated by the following impairments. Clinical Presentation Decision Making/ Complexity Score   1. OA of multiple joints  2. LBP  3. Visual deficits  4. Cognitive deficits  5. B rotator cuff repair  6. Vascular dementia 1. Decreased balance  2. Gait deficits  3. Increased fall risk  4. Decreased LE strength  5. Impaired endurance  6. Postural deficits evolving-unpredictable symptom presentation, cognitive deficits     high high moderate moderate         Pt's spiritual, cultural and educational needs considered and pt agreeable to plan of care and goals as stated below:      GOALS:   Short term goals: 4 weeks, pt agrees to goals set.  7. Pt to report performing HEP daily to increased IND.  8. Pt to demonstrate nadiya hip flexion MMT score to 4/5 to improve functional mobility.   9. Pt to demonstrate L hip abduction MMT score to 4/5 to improve functional mobility.   10. Pt to  improve L knee extension MMT score to 4/5 to improve functional mobility  11. Pt to improve L knee flexion MMT score to 4/5 to improve functional mobility  12. Pt to improve chair rise score to at least 8 with no hands for improved endurance with functional mobility  13. Pt to improve MCTSIB condition 4 score to at least 15 seconds for improved balance and decreased fall risk.   14. Pt to improve TUG score with SPC to no more than 16 seconds for improved safety with ambulation  15. Pt to improve SSWS to at least 0.75 m/sec for improved safety with community ambulation.     Long term goals: 8 weeks, pt agrees to goals set  11. Pt to demonstrate more upright standing/seated head/shoulder posture to improve balance.  12. Pt to improve SSWS to at least 0.86m/sec for improved safety with community ambulation.   13. Pt to demonstrate nadiya hip flexion strength of 4+/5 to improve functional mobility.  14. Pt to demonstrate L hip abduction strength to 4+/5 to improve functional mobility.  16. Pt to improve L knee extension MMT score to 4/+5 to improve functional mobility  17. Pt to improve L knee flexion MMT score to 4+/5 to improve functional mobility  18. Pt to improve chair rise score to at least 9 with no hands for improved endurance with functional mobility  19. Pt to improve MCTSIB condition 4 score to at least 20 seconds for improved balance and decreased fall risk.   20. Pt to improve TUG score with SPC to no more than 14 seconds for improved safety with ambulation  21. Pt to improve FOTO score to at least 60% for improved self concept with functional mobility.     Plan   Outpatient physical therapy 2 times weekly to include: Pt Education, HEP, therapeutic exercises, neuromuscular re-education, therapeutic activities, manual therapy, joint mobilizations, aquatic therapy and modalities PRN to achieve established goals. Pt may be seen by PTA as part of the rehabilitation team.      Cont PT for  8 weeks.     Katerine Cole  Jeison, PTA   01/25/2018

## 2018-01-25 NOTE — PROGRESS NOTES
OUTPATIENT NEUROLOGICAL REHABILITATION  SPEECH THERAPY PROGRESS NOTE    Date:  1/25/2018     Start Time:  1115  Stop Time:  1145    Subjective/History  Onset Date:  Approximately 1 to 2 years ago  Primary Diagnosis:  Cognitive Impairment, Hemichorea, Debility  Treatment Diagnosis:    1. Cognitive communication deficit      2. Dysarthria         Referring Provider:  Dr. 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     Past Medical History:   Past Medical History:   Diagnosis Date    Anemia     Arthritis     Hashimoto's disease     Hypertension     IGT (impaired glucose tolerance) 1/12/2016    Joint pain     Multinodular goiter 1/12/2016     Precautions:  Memory loss        TIMED  Procedure Min.    Cognitive Therapy  30         UNTIMED  Procedure Min.                  Total Minutes: 30  Total Timed Units: 2  Total Untimed Units: 0  Charges Billed/# of units: 2    Visit #: 9  Date of Evaluation:  12/19/17  Plan of Care Expiration:    02/16/18  Extended POC:    G-CODE   9/10    eval   CK Memory   update          Progress/Current Status    Subjective:     Pain: 6 /10  Pt complained of neck pain. She went to pain management Dr. Gonzalez yesterday for injection.    Objective:     Short Term Goals: (6 weeks) Current Progress:   1. Dr. Lux will complete short term memory tasks (immediate) with 90% acc given min cues over 3 consecutive sessions.    - Pt repeated words in alphabetical order: 3 words - 100% acc ind'ly; 4 words - 60% acc ind'ly, 100% acc given 2 reps     2. Dr. Lux will complete short term memory tasks (delayed) with 90% acc given min cues over 3 consecutive sessions. - Pt recalled details from her doctor appointment yesterday with 100% acc       3. Dr. Lux  will complete mental flexibility/manipulation tasks with 90% acc given min cues over 3 consecutive sessions. - Pt repeated words in alphabetical order: 3 words - 100% acc ind'ly; 4 words - 60% acc ind'ly, 100% acc given 2 reps   4. Dr. Lux will problem solving, reasoning and thought organization tasks with with 90% acc given min cues over 3 consecutive sessions. - Pt identified problems and selected best solutions to problems with 90% acc ind'ly, 100% acc given cues.         5. Dr. Lux will list 15 to 20 items in concrete categories within one minute over 3 consecutive sessions. - Cities: 9 ind'ly, 11 given cues  - Female names: 14      6. Dr. Lux will utilize motor speech strategies (loud, over articulated speech, good breath support) in speech tasks with 90% accuracy given min cues over 3 consecutive sessions. - Pt's speech was 100% intelligible today. Goal met x 3           Assessment:   *** Current goals remain appropriate. Goals to be updated as necessary.      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 She presents with impaired short term memory, word finding, problem solving, thought organization, executive function, and mental flexibility skills. Pt would benefit from continued skilled ST. Prognosis for improvement remains Good     Patient Education/Response:   Purpose of the ENT consult. Word finding strategies.  She verbalized understanding.     Home Program: n/a    Plans and Goals:     Continue POC with focus on her cognitive-communicative deficits       Other Recommendations: ENT consult     XUAN Goznalez, CCC-SLP  Speech-Language Pathologist  [facility]  Neurological Rehabilitation

## 2018-01-25 NOTE — PROGRESS NOTES
"OUTPATIENT NEUROLOGICAL REHABILITATION  SPEECH THERAPY PROGRESS NOTE    Date:  1/25/2018     Start Time:  10:50  Stop Time:  11:15    Subjective/History  Onset Date:  Approximately 1 to 2 years ago  Primary Diagnosis:  Cognitive Impairment, Hemichorea, Debility  Treatment Diagnosis:    1. Cognitive communication deficit      2. Dysarthria         Referring Provider:  Dr. 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     Past Medical History:   Past Medical History:   Diagnosis Date    Anemia     Arthritis     Hashimoto's disease     Hypertension     IGT (impaired glucose tolerance) 1/12/2016    Joint pain     Multinodular goiter 1/12/2016     Precautions:  Memory loss        TIMED  Procedure Min.    Cognitive Therapy  25         UNTIMED  Procedure Min.                  Total Minutes: 25  Total Timed Units: 2  Total Untimed Units: 0  Charges Billed/# of units: 2    Visit #: 11  Date of Evaluation:  12/19/17  Plan of Care Expiration:    02/16/18  Extended POC:    G-CODE   11/10    eval   CK Memory   Update  1/23/18          Progress/Current Status    Subjective:     Pain: 6 /10  Pt complained of upset stomach prior to starting the session, and was only able to participate for part of the session. The pt went to her appointment with Dr. Quintanilla (ENT) yesterday, and results were as follows:   "All findings were normal except: bilateral, mild vocal fold atrophy; decerased mucosal wave; minimal variable glottal insufficiency, compensatory supraglottic hyperfunction/laryngeal spasm, mild."       Objective:     Short Term Goals: (6 weeks) Current Progress:   1. Dr. Lux will complete short term memory tasks (immediate) with 90% acc given min cues over 3 consecutive sessions. "    - Pt recalled general information from a paragraph read to her with - 100% acc ind'ly  - Pt recalled specific information from a paragraph read to her with - 85% acc ind'ly  - Overall Average: 82% acc IND'ly, 100% acc given cues Goal met/revise 1/23/18   1a. Dr. Lux will complete short term memory tasks (immediate) with 90% acc given IND'ly  - Pt retold 4/5 facts from a story read to her IND'ly.    2. Dr. Lux will complete short term memory tasks (delayed) with 90% acc given min cues over 3 consecutive sessions. - Pt recalled general information from a paragraph read to her after 9 minutes with 100% acc ind'ly  -  Overall Average: 82% acc IND'ly, 100% acc given cues Goal revised 1/23/18     2a. Dr. Lux will complete short term memory tasks (delayed) with 90% acc given min cues. - Given an 8 min delay, pt IND'ly retold 5/5 facts from a story read to her.    3. Dr. Lux will complete mental flexibility/manipulation tasks with 90% acc given min cues over 3 consecutive sessions. - Pt unscrambled sentences: 4 words - 80% acc ind'ly, 100% acc given 1 cue; 5 words - 80% acc ind'ly, 100% acc given rep.  - Overall Average: 80% acc IND'ly, 100% acc given cues Goal met/revise 1/23/18   3a. Dr. Lux will complete mental flexibility/manipulation tasks with 90% acc IND'ly. - Not formally addressed      4. Dr. Lux will problem solving, reasoning and thought organization tasks with with 90% acc given min cues over 3 consecutive sessions. - Not formally addressed today  -Overall Average: 91% acc IND'ly, 100% acc given cues Goal met/revise 1/23/18      4a.  Dr. Lux will complex problem solving, reasoning and thought organization tasks with with 90% acc IND'ly - During an abstract word categorization activity, the pt IND'ly identified 9/10 words that did not belong with the given words.    5. Dr. Lux will list 15 to 20 items in concrete categories within one minute over 3 consecutive sessions. - Cities: 9 ind'ly, 11  given cues  - Female names: 14   - Overall Average: 12 items   Goal not met/Revise   5a. Dr. Lux will list 15 to 20 items in concrete categories.  - Music genres: 7 ind'ly, and 8 given 1 cue     6. Dr. Lux will utilize motor speech strategies (loud, over articulated speech, good breath support) in speech tasks with 90% accuracy given min cues over 3 consecutive sessions. - Pt's speech was 100% intelligible today. Goal met x 3/ Discontinue           Assessment:   Pt exhibited increased accuracy with delayed memory skills, and continues to make progress. Pt seen by ENT, and her vocal fold function is stable, according to Dr. Quintanilla's report. Dr. Lux discussed that she is addressing her voice issues with a vocal  once a week to get back to her prior singing voice. She is a professional barr.     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 She presents with impaired short term memory, word finding, problem solving, thought organization, executive function, and mental flexibility skills. Pt would benefit from continued skilled ST. Prognosis for improvement remains Good     Functional Communication Measure (FCM):   Severity Modifier for Medicare G-Code:  Memory  UPDATED 1/23/18  Current status: FCM:  Level 5   - CJ  at least 20% < 40% impaired, limited or restricted  Projected status:  FCM: Level 6   - CI at least 1% < 20% impaired, limited or restricted     Other SLP Functional Limitation (Problem Solving) UPDATED 1/23/18  Current status: FCM: Level 5   - CJ at least 20% < 40% impaired, limited or restricted  Projected status:  FCM:  Level 6   - CI at least 1% < 20% impaired, limited or restricted     Patient Education/Response:   Discussed continuing to participate in voice coaching and utilizing the vocal exercises given to her by her . If pt desires, some voice exercises can be provided in speech therapy as well. She verbalized understanding.     Home  Program: n/a    Plans and Goals:     Continue POC with focus on her cognitive-communicative deficits     Other Recommendations: None at this time      JOSE Curiel    Clinician     I certify that I was present in the room directing the student in service delivery and guiding them using my skilled judgment. As the co-signing therapist I have reviewed the students documentation and am responsible for the treatment, assessment, and plan.    XUAN Gonzalez, CCC-SLP  Speech-Language Pathologist

## 2018-01-25 NOTE — PROGRESS NOTES
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    Chief Complaint: Constipation    This is an 83yo female who presents for a f/u visit regarding abdominal pain. She has abdominal pain chronically and had been dx'd with IBS with worsening symptoms over the past one year. It is described as a diffuse abdominal distention which is severe, nonradiating often worsened with food intake. No particular dietary relation.  She has chronic constipation though recently has also had 1 or 2 episodes of incontinence.  No other exacerbating or relieving factors or other associated symptoms.  No dark urine, jaundice, flushing, fever.  A colonoscopy in 2015 and 1 diminutive colon polyp which was removed.  She does have a history of peptic ulcer disease as well and has had endoscopies over 3 years ago, f/u with one small ulcer though symptoms out of proportion to this finding. She denies NSAID usage.  She does take narcotics as needed.  Dicyclomine has been the most helpful medication treating this particular abdominal discomfort with variable results. Her weight is stable. Linzess was also tried, given prior decent response. Also fair response.  No vomitng, nausea.  She has okay oral intake.  Her friend is here to give additional history and does note overall her being much improved.      The following portions of the patient's history were reviewed and updated as appropriate: allergies, current medications, past family history, past medical history, past social history, past surgical history and problem list.     (Portions of this note were dictated using voice recognition software and may contain dictation related errors in spelling/grammar/syntax not found on text review)     HPI  Review of Systems   Constitutional: Negative for fatigue and unexpected weight change.   Cardiovascular: Negative for chest pain and leg swelling.   Gastrointestinal: Positive for abdominal pain. Negative for blood in stool.        Objective:      Physical Exam   Constitutional: She is oriented to person, place, and time. She appears well-developed and well-nourished. No distress.   HENT:   Head: Normocephalic and atraumatic.   Eyes: Conjunctivae are normal. No scleral icterus.   Pulmonary/Chest: Effort normal and breath sounds normal. No respiratory distress. She has no wheezes.   Abdominal: Soft. Bowel sounds are normal. She exhibits no distension. There is no tenderness.   Neurological: She is alert and oriented to person, place, and time.   Skin: Skin is warm and dry. No rash noted. She is not diaphoretic. No erythema.   Nursing note and vitals reviewed.      Labs; hct 37  Imaging: ct head reviewed  Assessment:       1. Irritable bowel syndrome with both constipation and diarrhea    2. Long term (current) use of systemic steroids    3. Iron deficiency anemia, unspecified iron deficiency anemia type        Plan:   1. Continue current meds, dietary modifications  2. Overall much improved  3. Monitor clinically, labs

## 2018-01-30 ENCOUNTER — DOCUMENTATION ONLY (OUTPATIENT)
Dept: REHABILITATION | Facility: HOSPITAL | Age: 83
End: 2018-01-30

## 2018-01-30 ENCOUNTER — CLINICAL SUPPORT (OUTPATIENT)
Dept: REHABILITATION | Facility: HOSPITAL | Age: 83
End: 2018-01-30
Attending: PHYSICAL MEDICINE & REHABILITATION
Payer: MEDICARE

## 2018-01-30 ENCOUNTER — OUTPATIENT CASE MANAGEMENT (OUTPATIENT)
Dept: ADMINISTRATIVE | Facility: OTHER | Age: 83
End: 2018-01-30

## 2018-01-30 DIAGNOSIS — R47.1 DYSARTHRIA: ICD-10-CM

## 2018-01-30 DIAGNOSIS — Z74.09 IMPAIRED FUNCTIONAL MOBILITY, BALANCE, GAIT, AND ENDURANCE: ICD-10-CM

## 2018-01-30 DIAGNOSIS — R41.841 COGNITIVE COMMUNICATION DEFICIT: ICD-10-CM

## 2018-01-30 PROCEDURE — 97110 THERAPEUTIC EXERCISES: CPT | Mod: PO

## 2018-01-30 PROCEDURE — 97127 HC THERAPEUTIC INTVTN, COGN FUNCTION - ST: CPT | Mod: PO

## 2018-01-30 NOTE — PLAN OF CARE
Physical Therapy Progress Note     Name: Selma Lux MD  Clinic Number: [id number]  Diagnosis:   Encounter Diagnosis   Name Primary?    Impaired functional mobility, balance, gait, and endurance      Physician: Dee Thomson, *  Treatment Orders: PT Eval and Treat  Past Medical History:   Diagnosis Date    Anemia     Arthritis     Hashimoto's disease     Hypertension     IGT (impaired glucose tolerance) 1/12/2016    Joint pain     Multinodular goiter 1/12/2016     Re-Evaluation Date: 12/12/17  Total 2017 Visit #: 6  2018 Visit #: 7  Extended POC Expiration: 01/30/18  Extended POC Expiration: 02/27/18  Precautions: universal, visual impairment, impaired memory      G codes 6/10             Subjective   Pt reports: that she had a good weekend  Pain Scale:  3/10 L sided neck pain    Objective     Pt in restroom x 10 min at beginning of therapy session    Patient received individual therapy with activities as follows:     Therapeutic exercises to increase strength and endurance x 35  min including;      X 10 min on Nu Step recumbent stepper .  Level 5.0 for improved CV endurance.     Lower Extremity Strength  Right LE  10/10/17 12/12/17 01/30/18 Left LE  10/10/17 12/12/17 01/30/18   Hip Flexion: 3/5 4-/5 4/5 Hip Flexion: 4/5 4-/5 4/5   Hip Abduction: 4/5 4+/5 4+/5 Hip Abduction: 4/5 4-/5 4/5   Hip Adduction: 5/5 4+/5 5/5 Hip Adduction 4+/5 4+/5 4+/5   Knee Extension: 4+/5 4+/5 5/5 Knee Extension: 4+/5 4/5 4+/5   Knee Flexion: 5/5 5/5 4+/5 Knee Flexion: 4+/5 3+/5 4/5   Ankle Dorsiflexion: 5/5 5/5 5/5 Ankle Dorsiflexion: 5/5 5/5 5/5           Evaluation- 10/10/17 12/12/17 01/30/18   30 second Chair Rise 10 completed w/o UE support 7 completed with no UE support, CGA 9 completed with no UE support, CGA      Postural control:  MCTSIB:  1. Eyes Open/feet together/Firm: 30 seconds  2. Eyes Closed/feet together/Firm: 30 seconds  3. Eyes Open/feet  "together/Foam: 30 seconds  4. Eyes Closed/feet together/Foam: 30 seconds     Gait Assessment:   - AD used: SPC (RT hand)  - Assistance: none  - Distance: ~170ft with SBA      GAIT DEVIATIONS:  Selma displays the following deviations with ambulation: decreased step length on RLE, trendelenburg on L hip, decreased step width nadiya, forward trunk lean, increased R hip adduction in TSW/IC      Evaluation 12/12/17 01/30/18   Timed Up and Go 11 sec c/ SPC 18 sec c/ SPC and CGA  24 sec c/ RW and CGA 14 sec c/ SPC,  SBA   Self Selected Walking Speed 0.71 m/sec (6m/8.5s)  C/ SPC 0.67 m/sec (6m/9s) c/ RW  0.67 m/sec (6m/9s) c/ SPC  CGA for both trials 0.75 m/sec (6m/8s) c/ SPC       X 4 laps forward tandem ambulation c/ 1 UE support, SBA  2 x 30", each LE SLS with alternate LE placed on 4pt fitter; Min A to CGA    Written Home Exercises: Continue HEP   Supine Quad sets, SLR, bridges, hip abd/add, Heel slides  Pt demo good understanding of the education provided. Selma demonstrated good return demonstration of activities.     Education provided re: POC, HEP  No spiritual or educational barriers to learning provided    Pt has no cultural, educational or language barriers to learning provided.    Assessment   Selma tolerated therapy session well, with some limitations 2* bathroom break. Pt reassessed this therapy to update POC. Pt has made good but slow progress with therapy. She demonstrates improved BLE strength with LLE remaining slightly weaker compared to RLE. Pt demonstrates improved chair rise score indicating improved endurance with functional transfers. She also demonstrates improved TUG score and improved SSWS score; pt's SSWS does continue to place her at a fall risk for community ambulation. Pt demonstrates improved LLE clearance and step length during ambulation, but she does continue to demonstrate poor standing posture, L hip circumduction, and occ lateral veering. Pt's MCTSIB score has improved, although pt does " continue to require UE support during dynamic balance activities. HEP compliance questionable due to cognitive deficits. Pt to benefit from continued PT intervention to further address remaining mobility deficits. PT to extend POC x 4 more weeks.     Pt rehab potential is Good. Pt will benefit from continuing skilled outpatient physical therapy to address the deficits listed below in the problem list, provide pt/family education and to maximize pt's level of independence in the home and community environment.      History  Co-morbidities and personal factors that may impact the plan of care Examination  Body Structures and Functions, activity limitations and participation restrictions that may impact the plan of care. Medical necessity is demonstrated by the following impairments. Clinical Presentation Decision Making/ Complexity Score   1. OA of multiple joints  2. LBP  3. Visual deficits  4. Cognitive deficits  5. B rotator cuff repair  6. Vascular dementia 1. Decreased balance  2. Gait deficits  3. Increased fall risk  4. Decreased LE strength  5. Impaired endurance  6. Postural deficits evolving-unpredictable symptom presentation, cognitive deficits     high high moderate moderate         Pt's spiritual, cultural and educational needs considered and pt agreeable to plan of care and goals as stated below:      GOALS:   Short term goals: 4 weeks, pt agrees to goals set.  7. Pt to report performing HEP daily to increased IND.  8. Pt to demonstrate nadiya hip flexion MMT score to 4/5 to improve functional mobility. MET  9. Pt to demonstrate L hip abduction MMT score to 4/5 to improve functional mobility. MET  10. Pt to improve L knee extension MMT score to 4/5 to improve functional mobility MET  11. Pt to improve L knee flexion MMT score to 4/5 to improve functional mobility MET  12. Pt to improve chair rise score to at least 8 with no hands for improved endurance with functional mobility MET  13. Pt to improve MCTSIB  condition 4 score to at least 15 seconds for improved balance and decreased fall risk. MET  14. Pt to improve TUG score with SPC to no more than 16 seconds for improved safety with ambulation MET  15. Pt to improve SSWS to at least 0.75 m/sec for improved safety with community ambulation. MET     Long term goals: 8 weeks, pt agrees to goals set  11. Pt to demonstrate more upright standing/seated head/shoulder posture to improve balance.  12. Pt to improve SSWS to at least 0.86m/sec for improved safety with community ambulation.   13. Pt to demonstrate nadiya hip flexion strength of 4+/5 to improve functional mobility.  14. Pt to demonstrate L hip abduction strength to 4+/5 to improve functional mobility.  16. Pt to improve L knee extension MMT score to 4/+5 to improve functional mobility MET  17. Pt to improve L knee flexion MMT score to 4+/5 to improve functional mobility  18. Pt to improve chair rise score to at least 9 with no hands for improved endurance with functional mobility MET  19. Pt to improve MCTSIB condition 4 score to at least 20 seconds for improved balance and decreased fall risk. MET  20. Pt to improve TUG score with SPC to no more than 14 seconds for improved safety with ambulation MET  21. Pt to improve FOTO score to at least 60% for improved self concept with functional mobility.     Plan   Outpatient physical therapy 2 times weekly to include: Pt Education, HEP, therapeutic exercises, neuromuscular re-education, therapeutic activities, manual therapy, joint mobilizations, aquatic therapy and modalities PRN to achieve established goals. Pt may be seen by PTA as part of the rehabilitation team.      Cont PT for  8 weeks.     Kayleen Bañuelos, PT   01/30/2018

## 2018-01-30 NOTE — PROGRESS NOTES
Physical Therapy Progress Note     Name: Selma Lux MD  Clinic Number: [id number]  Diagnosis:   Encounter Diagnosis   Name Primary?    Impaired functional mobility, balance, gait, and endurance      Physician: Dee Thomson, *  Treatment Orders: PT Eval and Treat  Past Medical History:   Diagnosis Date    Anemia     Arthritis     Hashimoto's disease     Hypertension     IGT (impaired glucose tolerance) 1/12/2016    Joint pain     Multinodular goiter 1/12/2016     Re-Evaluation Date: 12/12/17  Total 2017 Visit #: 6  2018 Visit #: 7  Extended POC Expiration: 01/30/18  Extended POC Expiration: 02/27/18  Precautions: universal, visual impairment, impaired memory      G codes 6/10             Subjective   Pt reports: that she had a good weekend  Pain Scale:  3/10 L sided neck pain    Objective     Pt in restroom x 10 min at beginning of therapy session    Patient received individual therapy with activities as follows:     Therapeutic exercises to increase strength and endurance x 35  min including;      X 10 min on Nu Step recumbent stepper .  Level 5.0 for improved CV endurance.     Lower Extremity Strength  Right LE  10/10/17 12/12/17 01/30/18 Left LE  10/10/17 12/12/17 01/30/18   Hip Flexion: 3/5 4-/5 4/5 Hip Flexion: 4/5 4-/5 4/5   Hip Abduction: 4/5 4+/5 4+/5 Hip Abduction: 4/5 4-/5 4/5   Hip Adduction: 5/5 4+/5 5/5 Hip Adduction 4+/5 4+/5 4+/5   Knee Extension: 4+/5 4+/5 5/5 Knee Extension: 4+/5 4/5 4+/5   Knee Flexion: 5/5 5/5 4+/5 Knee Flexion: 4+/5 3+/5 4/5   Ankle Dorsiflexion: 5/5 5/5 5/5 Ankle Dorsiflexion: 5/5 5/5 5/5           Evaluation- 10/10/17 12/12/17 01/30/18   30 second Chair Rise 10 completed w/o UE support 7 completed with no UE support, CGA 9 completed with no UE support, CGA      Postural control:  MCTSIB:  1. Eyes Open/feet together/Firm: 30 seconds  2. Eyes Closed/feet together/Firm: 30 seconds  3. Eyes Open/feet  "together/Foam: 30 seconds  4. Eyes Closed/feet together/Foam: 30 seconds     Gait Assessment:   - AD used: SPC (RT hand)  - Assistance: none  - Distance: ~170ft with SBA      GAIT DEVIATIONS:  Selma displays the following deviations with ambulation: decreased step length on RLE, trendelenburg on L hip, decreased step width nadiya, forward trunk lean, increased R hip adduction in TSW/IC      Evaluation 12/12/17 01/30/18   Timed Up and Go 11 sec c/ SPC 18 sec c/ SPC and CGA  24 sec c/ RW and CGA 14 sec c/ SPC,  SBA   Self Selected Walking Speed 0.71 m/sec (6m/8.5s)  C/ SPC 0.67 m/sec (6m/9s) c/ RW  0.67 m/sec (6m/9s) c/ SPC  CGA for both trials 0.75 m/sec (6m/8s) c/ SPC       X 4 laps forward tandem ambulation c/ 1 UE support, SBA  2 x 30", each LE SLS with alternate LE placed on 4pt fitter; Min A to CGA    Written Home Exercises: Continue HEP   Supine Quad sets, SLR, bridges, hip abd/add, Heel slides  Pt demo good understanding of the education provided. Selma demonstrated good return demonstration of activities.     Education provided re: POC, HEP  No spiritual or educational barriers to learning provided    Pt has no cultural, educational or language barriers to learning provided.    Assessment   Selma tolerated therapy session well, with some limitations 2* bathroom break. Pt reassessed this therapy to update POC. Pt has made good but slow progress with therapy. She demonstrates improved BLE strength with LLE remaining slightly weaker compared to RLE. Pt demonstrates improved chair rise score indicating improved endurance with functional transfers. She also demonstrates improved TUG score and improved SSWS score; pt's SSWS does continue to place her at a fall risk for community ambulation. Pt demonstrates improved LLE clearance and step length during ambulation, but she does continue to demonstrate poor standing posture, L hip circumduction, and occ lateral veering. Pt's MCTSIB score has improved, although pt does " continue to require UE support during dynamic balance activities. HEP compliance questionable due to cognitive deficits. Pt to benefit from continued PT intervention to further address remaining mobility deficits. PT to extend POC x 4 more weeks.     Pt rehab potential is Good. Pt will benefit from continuing skilled outpatient physical therapy to address the deficits listed below in the problem list, provide pt/family education and to maximize pt's level of independence in the home and community environment.      History  Co-morbidities and personal factors that may impact the plan of care Examination  Body Structures and Functions, activity limitations and participation restrictions that may impact the plan of care. Medical necessity is demonstrated by the following impairments. Clinical Presentation Decision Making/ Complexity Score   1. OA of multiple joints  2. LBP  3. Visual deficits  4. Cognitive deficits  5. B rotator cuff repair  6. Vascular dementia 1. Decreased balance  2. Gait deficits  3. Increased fall risk  4. Decreased LE strength  5. Impaired endurance  6. Postural deficits evolving-unpredictable symptom presentation, cognitive deficits     high high moderate moderate         Pt's spiritual, cultural and educational needs considered and pt agreeable to plan of care and goals as stated below:      GOALS:   Short term goals: 4 weeks, pt agrees to goals set.  7. Pt to report performing HEP daily to increased IND.  8. Pt to demonstrate nadiya hip flexion MMT score to 4/5 to improve functional mobility. MET  9. Pt to demonstrate L hip abduction MMT score to 4/5 to improve functional mobility. MET  10. Pt to improve L knee extension MMT score to 4/5 to improve functional mobility MET  11. Pt to improve L knee flexion MMT score to 4/5 to improve functional mobility MET  12. Pt to improve chair rise score to at least 8 with no hands for improved endurance with functional mobility MET  13. Pt to improve MCTSIB  condition 4 score to at least 15 seconds for improved balance and decreased fall risk. MET  14. Pt to improve TUG score with SPC to no more than 16 seconds for improved safety with ambulation MET  15. Pt to improve SSWS to at least 0.75 m/sec for improved safety with community ambulation. MET     Long term goals: 8 weeks, pt agrees to goals set  11. Pt to demonstrate more upright standing/seated head/shoulder posture to improve balance.  12. Pt to improve SSWS to at least 0.86m/sec for improved safety with community ambulation.   13. Pt to demonstrate nadiya hip flexion strength of 4+/5 to improve functional mobility.  14. Pt to demonstrate L hip abduction strength to 4+/5 to improve functional mobility.  16. Pt to improve L knee extension MMT score to 4/+5 to improve functional mobility MET  17. Pt to improve L knee flexion MMT score to 4+/5 to improve functional mobility  18. Pt to improve chair rise score to at least 9 with no hands for improved endurance with functional mobility MET  19. Pt to improve MCTSIB condition 4 score to at least 20 seconds for improved balance and decreased fall risk. MET  20. Pt to improve TUG score with SPC to no more than 14 seconds for improved safety with ambulation MET  21. Pt to improve FOTO score to at least 60% for improved self concept with functional mobility.     Plan   Outpatient physical therapy 2 times weekly to include: Pt Education, HEP, therapeutic exercises, neuromuscular re-education, therapeutic activities, manual therapy, joint mobilizations, aquatic therapy and modalities PRN to achieve established goals. Pt may be seen by PTA as part of the rehabilitation team.      Cont PT for  8 weeks.     Kayleen Bañuelos, PT   01/30/2018

## 2018-01-30 NOTE — PROGRESS NOTES
OUTPATIENT NEUROLOGICAL REHABILITATION  SPEECH THERAPY PROGRESS NOTE    Date:  1/30/2018     Start Time:  11:15  Stop Time:  12:00    Subjective/History  Onset Date:  Approximately 1 to 2 years ago  Primary Diagnosis:  Cognitive Impairment, Hemichorea, Debility  Treatment Diagnosis:    1. Cognitive communication deficit      2. Dysarthria         Referring Provider:  Dr. 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     Past Medical History:   Past Medical History:   Diagnosis Date    Anemia     Arthritis     Hashimoto's disease     Hypertension     IGT (impaired glucose tolerance) 1/12/2016    Joint pain     Multinodular goiter 1/12/2016     Precautions:  Memory loss        TIMED  Procedure Min.    Cognitive Therapy  45         UNTIMED  Procedure Min.                  Total Minutes: 45  Total Timed Units: 3  Total Untimed Units: 0  Charges Billed/# of units: 3    Visit #: 12  Date of Evaluation:  12/19/17  Plan of Care Expiration:    02/16/18  Extended POC:    G-CODE   2/10   eval   CK Memory   Update  1/23/18          Progress/Current Status    Subjective:     Pain: 0 /10      Objective:     Short Term Goals: (6 weeks) Current Progress:   1. Dr. Lux will complete short term memory tasks (immediate) with 90% acc given min cues over 3 consecutive sessions.    - Pt recalled general information from a paragraph read to her with - 100% acc ind'ly  - Pt recalled specific information from a paragraph read to her with - 85% acc ind'ly  - Overall Average: 82% acc IND'ly, 100% acc given cues Goal met/revise 1/23/18   1a. Dr. Lux will complete short term memory tasks (immediate) with 90% acc given IND'ly  - Pt immediately IND'ly recalled 3-word sets given by clinician with  "90% acc.    2. Dr. Lux will complete short term memory tasks (delayed) with 90% acc given min cues over 3 consecutive sessions. - Pt recalled general information from a paragraph read to her after 9 minutes with 100% acc ind'ly  -  Overall Average: 82% acc IND'ly, 100% acc given cues Goal revised 1/23/18     2a. Dr. Lux will complete short term memory tasks (delayed) with 90% acc given min cues. -Pt recalled 3/3 unrelated words given by clinician IND'ly after a 10 minute delay.     3. Dr. Lux will complete mental flexibility/manipulation tasks with 90% acc given min cues over 3 consecutive sessions. - Pt unscrambled sentences: 4 words - 80% acc ind'ly, 100% acc given 1 cue; 5 words - 80% acc ind'ly, 100% acc given rep.  - Overall Average: 80% acc IND'ly, 100% acc given cues Goal met/revise 1/23/18   3a. Dr. Lux will complete mental flexibility/manipulation tasks with 90% acc IND'ly. - Pt ranked 3-word sets according to instructions given by clinician with 80% acc, which increased to 90% given repetition of words x1.    4. Dr. Lux will problem solving, reasoning and thought organization tasks with with 90% acc given min cues over 3 consecutive sessions. - Not formally addressed today  -Overall Average: 91% acc IND'ly, 100% acc given cues Goal met/revise 1/23/18      4a.  Dr. Lux will complex problem solving, reasoning and thought organization tasks with with 90% acc IND'ly - Pt achieved 90% acc while completing a problem solving task in which she was required to read the given directions, use reasoning skills to interpret directions, and perform the corresponding task. Accuracy increased to 100% given min cues.    5. Dr. Lux will list 15 to 20 items in concrete categories within one minute over 3 consecutive sessions. - Words that start with "t": 10 ind'ly, 12 given cues  - Words that start with "c": 10 ind'ly, 11 given cues  -Things that are yellow: 5 ind'ly, 7 given cues  - Overall Average: 10 items "   Goal not met/Revise   5a. Dr. Lux will list 15 to 20 items in concrete categories.  - Not formally addressed.    6. Dr. Lux will utilize motor speech strategies (loud, over articulated speech, good breath support) in speech tasks with 90% accuracy given min cues over 3 consecutive sessions. - Pt's speech was 100% intelligible today. Goal met x 3/ Discontinue           Assessment:   Pt continues to make progress towards goals, and has exhibited improvement in memory and mental manipulation skills. Current goals remain appropriate. Goals will be updated as needed.     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 She presents with impaired short term memory, word finding, problem solving, thought organization, executive function, and mental flexibility skills. Pt would benefit from continued skilled ST. Prognosis for improvement remains Good     Functional Communication Measure (FCM):   Severity Modifier for Medicare G-Code:  Memory  UPDATED 1/23/18  Current status: FCM:  Level 5   - CJ  at least 20% < 40% impaired, limited or restricted  Projected status:  FCM: Level 6   - CI at least 1% < 20% impaired, limited or restricted     Other SLP Functional Limitation (Problem Solving) UPDATED 1/23/18  Current status: FCM: Level 5   - CJ at least 20% < 40% impaired, limited or restricted  Projected status:  FCM:  Level 6   - CI at least 1% < 20% impaired, limited or restricted     Patient Education/Response:   Discussed strategies to aid with memory, and some activities and hobbies she could try in order to facilitate cognitive stimulation. She verbalized understanding.     Home Program: n/a    Plans and Goals:     Continue POC with focus on her cognitive-communicative deficits     Other Recommendations: None at this time      JOSE Curiel    Clinician

## 2018-01-30 NOTE — PROGRESS NOTES
1/30/18  CM f/u to update plan of care. Telephonic contact with Dr. Lux. She reports doing well. She continues with OP PT/ST. She communicates in clear strong voice.   She denies having any falls, uses her str cane to amb. Her [place] friend remains with pt until her dgt's return from [place] towards the end of Feb.   CM stressed pt safety with her mobility and bath care. She admits to forgetting the tub kilo but says she will be sure to get one.   She reports receiving an update on her heart status- indicating some diastolic dysfunction. She expresses interest in meeting with a dietician. CM offers pt the fact that the co pay for such a visit is not known to this CM. Dr. Lux would still like the referral to Nutritionist., message sent to Dr. Lee with request. CM to facilitate pt to Nutritionist and coordinate with pt's upcoming PCP appt 2/9 if possible.   Message sent to Gifty Carranza RD at [facility] to begin coordination of an appt if approved by PCP.     Plan for next encounter  Check on approval for Nutritionist Referral  Coordinate with RD as able.   F/u on pt safety---getting bath tub kilo.

## 2018-01-30 NOTE — PROGRESS NOTES
I, KAITLIN GraffT met with Katerine Mchugh PTA regarding this patient's POC. Continue to focus on BLE strengthening, improved posture, improved static and dynamic balance, and improved gait mechanics.     Face to face consultation with supervision PT held on 01/31/2018    Katerine Mchugh PTA

## 2018-02-01 ENCOUNTER — OUTPATIENT CASE MANAGEMENT (OUTPATIENT)
Dept: ADMINISTRATIVE | Facility: OTHER | Age: 83
End: 2018-02-01

## 2018-02-01 ENCOUNTER — CLINICAL SUPPORT (OUTPATIENT)
Dept: REHABILITATION | Facility: HOSPITAL | Age: 83
End: 2018-02-01
Attending: FAMILY MEDICINE
Payer: MEDICARE

## 2018-02-01 ENCOUNTER — TELEPHONE (OUTPATIENT)
Dept: INTERNAL MEDICINE | Facility: CLINIC | Age: 83
End: 2018-02-01

## 2018-02-01 DIAGNOSIS — R41.841 COGNITIVE COMMUNICATION DEFICIT: ICD-10-CM

## 2018-02-01 DIAGNOSIS — Z74.09 IMPAIRED FUNCTIONAL MOBILITY, BALANCE, GAIT, AND ENDURANCE: Primary | ICD-10-CM

## 2018-02-01 DIAGNOSIS — R47.1 DYSARTHRIA: ICD-10-CM

## 2018-02-01 DIAGNOSIS — Z74.09 IMPAIRED FUNCTIONAL MOBILITY, BALANCE, GAIT, AND ENDURANCE: ICD-10-CM

## 2018-02-01 DIAGNOSIS — I12.9 HYPERTENSIVE CHRONIC KIDNEY DISEASE WITH STAGE 1 THROUGH STAGE 4 CHRONIC KIDNEY DISEASE, OR UNSPECIFIED CHRONIC KIDNEY DISEASE: ICD-10-CM

## 2018-02-01 PROCEDURE — 97110 THERAPEUTIC EXERCISES: CPT | Mod: PO

## 2018-02-01 PROCEDURE — 97140 MANUAL THERAPY 1/> REGIONS: CPT | Mod: PO

## 2018-02-01 PROCEDURE — 97127 HC THERAPEUTIC INTVTN, COGN FUNCTION - ST: CPT | Mod: PO

## 2018-02-01 NOTE — TELEPHONE ENCOUNTER
----- Message from Domonique Baez RN sent at 1/30/2018  2:23 PM CST -----  Contact: MARILYN Jerez Dr./Staff:    Request for referral to Nutritionist if in agreement.     Dr. Lux is interested in an meeting with dietician on same day as PCP appt if possible;  brought on by recent cardiac findings she says.   I will coordinate with Gifty Carranza RD) at [facility].     Thank you,  OWEN Jerez, RN, CCM [facility]  TEL:  [phone]

## 2018-02-01 NOTE — PROGRESS NOTES
"                                                    Physical Therapy Progress Note     Name: Selma Rosado MD Jose J  Clinic Number: [id number]  Diagnosis:   Encounter Diagnosis   Name Primary?    Impaired functional mobility, balance, gait, and endurance      Physician: Dee Thomson, *  Treatment Orders: PT Eval and Treat  Past Medical History:   Diagnosis Date    Anemia     Arthritis     Hashimoto's disease     Hypertension     IGT (impaired glucose tolerance) 1/12/2016    Joint pain     Multinodular goiter 1/12/2016     Re-Evaluation Date: 12/12/17  Total 2017 Visit #: 6  2018 Visit #: 8  Extended POC Expiration: 01/30/18  Extended POC Expiration: 02/27/18  Precautions: universal, visual impairment, impaired memory      G codes 7/10             Subjective   Pt reports: that she's having an increase in L sided neck pain and stiffness; she reports that it caused her to have a difficult time getting around this morning   Pain Scale:  6/10 L sided neck pain    Objective     Patient received individual therapy with activities as follows:     Selma received the following manual therapy techniques were applied for 20 minutes.     Supine:   PROM lower cervical rotation L<>R x 10x each direction with stretch held at end range; lower cervical flexion x 10   PROM upper cervical extension <> flexion; increased stiffness noted   3 x 30" B UT stretch   Grades 2-3 B UPAs to C3, C4, C5, C6, C7    Hypomobility noted on L UPAs at C6, C7    Therapeutic exercises to increase strength and endurance x 25  min including;    X 10 min on Nu Step recumbent stepper .  Level 5.0 for improved CV endurance.     3 x 10 B SLRs; 1.5 # LLE, 2.0# cuff RLE    2 x 10 Lateral side steps across 4pt foam fitter, BUE support with CGA    Foam fitter:   2 x 30" static stance CGA   X 30" forward chest press with soccer ball, CGA      Written Home Exercises: Continue HEP   Supine Quad sets, SLR, bridges, hip abd/add, Heel slides  Pt demo " good understanding of the education provided. Selma demonstrated good return demonstration of activities.     Education provided re: POC, HEP  No spiritual or educational barriers to learning provided    Pt has no cultural, educational or language barriers to learning provided.    Assessment   Selma tolerated therapy session well without difficulty. Pt presents with c/o significant neck pain and tightness, but reports significant improvement in symptoms post intervention. Able to progress resistance for B SLRs without difficulty. Pt to benefit from continued PT intervention to further address remaining mobility deficits.     Pt rehab potential is Good. Pt will benefit from continuing skilled outpatient physical therapy to address the deficits listed below in the problem list, provide pt/family education and to maximize pt's level of independence in the home and community environment.      History  Co-morbidities and personal factors that may impact the plan of care Examination  Body Structures and Functions, activity limitations and participation restrictions that may impact the plan of care. Medical necessity is demonstrated by the following impairments. Clinical Presentation Decision Making/ Complexity Score   1. OA of multiple joints  2. LBP  3. Visual deficits  4. Cognitive deficits  5. B rotator cuff repair  6. Vascular dementia 1. Decreased balance  2. Gait deficits  3. Increased fall risk  4. Decreased LE strength  5. Impaired endurance  6. Postural deficits evolving-unpredictable symptom presentation, cognitive deficits     high high moderate moderate         Pt's spiritual, cultural and educational needs considered and pt agreeable to plan of care and goals as stated below:      GOALS:   Short term goals: 4 weeks, pt agrees to goals set.  7. Pt to report performing HEP daily to increased IND.  8. Pt to demonstrate nadiya hip flexion MMT score to 4/5 to improve functional mobility. MET  9. Pt to demonstrate L  hip abduction MMT score to 4/5 to improve functional mobility. MET  10. Pt to improve L knee extension MMT score to 4/5 to improve functional mobility MET  11. Pt to improve L knee flexion MMT score to 4/5 to improve functional mobility MET  12. Pt to improve chair rise score to at least 8 with no hands for improved endurance with functional mobility MET  13. Pt to improve MCTSIB condition 4 score to at least 15 seconds for improved balance and decreased fall risk. MET  14. Pt to improve TUG score with SPC to no more than 16 seconds for improved safety with ambulation MET  15. Pt to improve SSWS to at least 0.75 m/sec for improved safety with community ambulation. MET     Long term goals: 8 weeks, pt agrees to goals set  11. Pt to demonstrate more upright standing/seated head/shoulder posture to improve balance.  12. Pt to improve SSWS to at least 0.86m/sec for improved safety with community ambulation.   13. Pt to demonstrate nadiya hip flexion strength of 4+/5 to improve functional mobility.  14. Pt to demonstrate L hip abduction strength to 4+/5 to improve functional mobility.  16. Pt to improve L knee extension MMT score to 4/+5 to improve functional mobility MET  17. Pt to improve L knee flexion MMT score to 4+/5 to improve functional mobility  18. Pt to improve chair rise score to at least 9 with no hands for improved endurance with functional mobility MET  19. Pt to improve MCTSIB condition 4 score to at least 20 seconds for improved balance and decreased fall risk. MET  20. Pt to improve TUG score with SPC to no more than 14 seconds for improved safety with ambulation MET  21. Pt to improve FOTO score to at least 60% for improved self concept with functional mobility.     Plan   Outpatient physical therapy 2 times weekly to include: Pt Education, HEP, therapeutic exercises, neuromuscular re-education, therapeutic activities, manual therapy, joint mobilizations, aquatic therapy and modalities PRN to achieve  established goals. Pt may be seen by PTA as part of the rehabilitation team.      Cont PT for  8 weeks.     Kayleen Bañuelos, PT   02/01/2018

## 2018-02-01 NOTE — TELEPHONE ENCOUNTER
If not diabetic, then usually not covered by insurance. I will place a referral and let her decide if wants to pursue it. Will also cc our health , Vania.

## 2018-02-01 NOTE — PROGRESS NOTES
2/1/18  8:40 am -Communication from Gifty Carranza RD in [facility] w/appt availabilities 2 days ago. CM coordinated with pt the scheduling of her requested Dietician Consult--set for 2/9/18 at 2:30 pm prior to her PCP appt 4 pm in same [facility]. Dr. Lux is agreeable to the appt/date/time. Mailed appt.     Plan for next encounter  F/u with pt following her appt to PCP and Dietician  Plan on closing case

## 2018-02-01 NOTE — PROGRESS NOTES
OUTPATIENT NEUROLOGICAL REHABILITATION  SPEECH THERAPY PROGRESS NOTE    Date:  2/1/2018     Start Time:  10:30   Stop Time:  11:15    Subjective/History  Onset Date:  Approximately 1 to 2 years ago  Primary Diagnosis:  Cognitive Impairment, Hemichorea, Debility  Treatment Diagnosis:    1. Cognitive communication deficit      2. Dysarthria         Referring Provider:  Dr. 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     Past Medical History:   Past Medical History:   Diagnosis Date    Anemia     Arthritis     Hashimoto's disease     Hypertension     IGT (impaired glucose tolerance) 1/12/2016    Joint pain     Multinodular goiter 1/12/2016     Precautions:  Memory loss        TIMED  Procedure Min.    Cognitive Therapy  45         UNTIMED  Procedure Min.                  Total Minutes: 45  Total Timed Units: 3  Total Untimed Units: 0  Charges Billed/# of units: 3    Visit #: 13  Date of Evaluation:  12/19/17  Plan of Care Expiration:    02/16/18  Extended POC:    G-CODE   3/10   eval   CK Memory   Update  1/23/18          Progress/Current Status    Subjective:     Pain: 0 /10      Objective:     Short Term Goals: (6 weeks) Current Progress:   1. Dr. Lux will complete short term memory tasks (immediate) with 90% acc given min cues over 3 consecutive sessions.    - Pt recalled general information from a paragraph read to her with - 100% acc ind'ly  - Pt recalled specific information from a paragraph read to her with - 85% acc ind'ly  - Overall Average: 82% acc IND'ly, 100% acc given cues Goal met/revise 1/23/18   1a. Dr. Lux will complete short term memory tasks (immediate) with 90% acc given IND'ly  -Pt recalled details from a paragraph read to her with 80% acc  "ind'ly, which increased to 90% given mod cues.   - Pt immediately recalled 4-word sets with 90% acc given repetition x2.    2. Dr. uLx will complete short term memory tasks (delayed) with 90% acc given min cues over 3 consecutive sessions. - Pt recalled general information from a paragraph read to her after 9 minutes with 100% acc ind'ly  -  Overall Average: 82% acc IND'ly, 100% acc given cues Goal revised 1/23/18     2a. Dr. Lux will complete short term memory tasks (delayed) with 90% acc given min cues. -Pt recalled details from a paragraph read to her with 80% acc ind'ly after a 10 minute delay.    3. Dr. Lux will complete mental flexibility/manipulation tasks with 90% acc given min cues over 3 consecutive sessions. - Pt unscrambled sentences: 4 words - 80% acc ind'ly, 100% acc given 1 cue; 5 words - 80% acc ind'ly, 100% acc given rep.  - Overall Average: 80% acc IND'ly, 100% acc given cues Goal met/revise 1/23/18   3a. Dr. Lux will complete mental flexibility/manipulation tasks with 90% acc IND'ly. -Pt unscrambled sentences: 4 words - 60% acc ind'ly, 90% acc given repetitions and cue.    4. Dr. Lux will problem solving, reasoning and thought organization tasks with with 90% acc given min cues over 3 consecutive sessions. - Not formally addressed today  -Overall Average: 91% acc IND'ly, 100% acc given cues Goal met/revise 1/23/18      4a.  Dr. Lux will complex problem solving, reasoning and thought organization tasks with with 90% acc IND'ly -While completing a problem solving task in which she had to finish analogies read by the clinician, pt achieved 70% acc ind'ly which increased to 100% given mod cues.               5. Dr. Lux will list 15 to 20 items in concrete categories within one minute over 3 consecutive sessions. - Words that start with "d": 10 ind'ly  -Things that are red: 5 ind'ly, 6 given cues  - Overall Average: 8 items      5a. Dr. Lux will list 15 to 20 items in concrete " categories.  - Not formally addressed.    6. Dr. Lux will utilize motor speech strategies (loud, over articulated speech, good breath support) in speech tasks with 90% accuracy given min cues over 3 consecutive sessions. - Pt's speech was 100% intelligible today. Goal met x 3/ Discontinue           Assessment:   Pt made improvement with delayed memory tasks, and but exhibited some difficulty with divergent categorization activities. Current goals remain appropriate. Goals will be updated as needed.     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 She presents with impaired short term memory, word finding, problem solving, thought organization, executive function, and mental flexibility skills. Pt would benefit from continued skilled ST. Prognosis for improvement remains Good     Functional Communication Measure (FCM):   Severity Modifier for Medicare G-Code:  Memory  UPDATED 1/23/18  Current status: FCM:  Level 5   - CJ  at least 20% < 40% impaired, limited or restricted  Projected status:  FCM: Level 6   - CI at least 1% < 20% impaired, limited or restricted     Other SLP Functional Limitation (Problem Solving) UPDATED 1/23/18  Current status: FCM: Level 5   - CJ at least 20% < 40% impaired, limited or restricted  Projected status:  FCM:  Level 6   - CI at least 1% < 20% impaired, limited or restricted     Patient Education/Response:   Discussed strategies to aid with memory, and some activities and hobbies she could try in order to facilitate cognitive stimulation. She verbalized understanding.     Home Program: Deductive reasoning activity and Comparing Sentence Content activity worksheets given.     Plans and Goals:     Continue POC with focus on her cognitive-communicative deficits     Other Recommendations: None at this time      JOSE Curiel    Clinician

## 2018-02-05 NOTE — PROGRESS NOTES
"OUTPATIENT NEUROLOGICAL REHABILITATION  SPEECH THERAPY PROGRESS NOTE    Date:  2/6/2018     Start Time:  950  Stop Time:  1030    Subjective/History  Onset Date:  Approximately 1 to 2 years ago  Primary Diagnosis:  Cognitive Impairment, Hemichorea, Debility  Treatment Diagnosis:    1. Cognitive communication deficit      2. Dysarthria         Referring Provider:  Dr. 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  Precautions:  Memory loss    TIMED  Procedure Min.    Cognitive Therapy  40         UNTIMED  Procedure Min.                  Total Minutes: 40  Total Timed Units: 3  Total Untimed Units: 0  Charges Billed/# of units: 3    Visit #: 14  Date of Evaluation:  12/19/17  Plan of Care Expiration:    02/16/18  Extended POC:    G-CODE   14/10     Progress/Current Status  Subjective:   Pain: 0 /10    Pt was pleasant and motivated for therapy. Reports she feels she has "awaken herself" within the past few weeks since spending time with her daughters. Reported reduced independence with executive functioning skills/ memory.  Objective:     Short Term Goals: (6 weeks) Current Progress:   1a. Dr. Lux will complete short term memory tasks (immediate) with 90% acc given IND'ly  -Pt answered comprehension questions related to paragraph length material presented verbally with 100% acc ind.   2a. Dr. Lux will complete short term memory tasks (delayed) with 90% acc given min cues. Not formally addressed      3a. Dr. Lux will complete mental flexibility/manipulation tasks with 90% acc IND'ly. -Repeated 3 unit stimuli presented verbally in alphabetical order with 90% acc indly.   4a.  Dr. Lux will complex problem solving, reasoning and thought organization tasks with with 90% acc " IND'ly - Pt completed analogies with 100% acc indly              5a. Revised 2/6/18 - Duplicate goal of STG 5 previously: Dr. Lux will list 15 to 20 items in an abstract categories.  Abstract: things that are expensive-11 indly     Goals Met:  1. Dr. Lux will complete short term memory tasks (immediate) with 90% acc given min cues over 3 consecutive sessions. Goal met/revise 1/23/18  2. Dr. Lux will complete short term memory tasks (delayed) with 90% acc given min cues over 3 consecutive sessions. Goal revised 1/23/18  3. Dr. Lux will complete mental flexibility/manipulation tasks with 90% acc given min cues over 3 consecutive sessions. Goal met/revise 1/23/18  4. Dr. Lux will problem solving, reasoning and thought organization tasks with with 90% acc given min cues over 3 consecutive sessions. Goal met/revise 1/23/18  5. Dr. Lux will list 15 to 20 items in concrete categories within one minute over 3 consecutive sessions. Goal Not Met / Revise  6. Dr. Lux will utilize motor speech strategies (loud, over articulated speech, good breath support) in speech tasks with 90% accuracy given min cues over 3 consecutive sessions.Goal Met/ Discontinue  Assessment:   Pt speech 100% intelligible today. Improvements with reasoning skills noted today.    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 She presents with impaired short term memory, word finding, problem solving, thought organization, executive function, and mental flexibility skills. Pt would benefit from continued skilled ST. Prognosis for improvement remains Good     Functional Communication Measure (FCM):   Severity Modifier for Medicare G-Code:  Memory  UPDATED 1/23/18  Current status: FCM:  Level 5   - CJ  at least 20% < 40% impaired, limited or restricted  Projected status:  FCM: Level 6   - CI at least 1% < 20% impaired, limited or restricted     Other SLP Functional Limitation (Problem Solving) UPDATED  "1/23/18  Current status: FCM: Level 5   - CJ at least 20% < 40% impaired, limited or restricted  Projected status:  FCM:  Level 6   - CI at least 1% < 20% impaired, limited or restricted     Patient Education/Response:   Counseling provided re: reported memory deficits associated with stroke and impact on every day life. Educated pt on calendar use to recall daily to-do lists.    Home Program: Deductive reasoning activity and Comparing Sentence Content activity worksheets given. Finish these activities for next session.    Plans and Goals:   Continue POC with focus on her cognitive-communicative deficits. Focus on executive function and memory for home environment.      Other Recommendations: None at this time      Sisi Baez, BS -  Clinician   2/6/2018      "I, XUAN Conti, CCC-SLP , certify that I was present in the room directing the student in service delivery and guiding them using my skilled judgement.  As the co-signing therapist, I have reviewed the student's documentation and am responsible for the treatment, assessment and plan."    UXAN Conti, CCC-SLP  Speech Language Pathologist    2/6/2018   "

## 2018-02-06 ENCOUNTER — TELEPHONE (OUTPATIENT)
Dept: PAIN MEDICINE | Facility: CLINIC | Age: 83
End: 2018-02-06

## 2018-02-06 ENCOUNTER — CLINICAL SUPPORT (OUTPATIENT)
Dept: REHABILITATION | Facility: HOSPITAL | Age: 83
End: 2018-02-06
Attending: FAMILY MEDICINE
Payer: MEDICARE

## 2018-02-06 ENCOUNTER — TELEPHONE (OUTPATIENT)
Dept: INTERNAL MEDICINE | Facility: CLINIC | Age: 83
End: 2018-02-06

## 2018-02-06 DIAGNOSIS — Z74.09 IMPAIRED FUNCTIONAL MOBILITY, BALANCE, GAIT, AND ENDURANCE: ICD-10-CM

## 2018-02-06 DIAGNOSIS — R47.1 DYSARTHRIA: ICD-10-CM

## 2018-02-06 DIAGNOSIS — R41.841 COGNITIVE COMMUNICATION DEFICIT: ICD-10-CM

## 2018-02-06 PROCEDURE — 97110 THERAPEUTIC EXERCISES: CPT | Mod: PO

## 2018-02-06 PROCEDURE — 97127 HC THERAPEUTIC INTVTN, COGN FUNCTION - ST: CPT | Mod: PO

## 2018-02-06 PROCEDURE — 97140 MANUAL THERAPY 1/> REGIONS: CPT | Mod: PO

## 2018-02-06 RX ORDER — SERTRALINE HYDROCHLORIDE 25 MG/1
25 TABLET, FILM COATED ORAL DAILY
Qty: 90 TABLET | Refills: 1 | Status: SHIPPED | OUTPATIENT
Start: 2018-02-06 | End: 2018-03-14

## 2018-02-06 NOTE — PROGRESS NOTES
"                                                    Physical Therapy Progress Note     Name: Selma Alonzo Lux MD  Clinic Number: [id number]  Diagnosis:   Encounter Diagnosis   Name Primary?    Impaired functional mobility, balance, gait, and endurance      Physician: Dee Thomson, *  Treatment Orders: PT Eval and Treat  Past Medical History:   Diagnosis Date    Anemia     Arthritis     Hashimoto's disease     Hypertension     IGT (impaired glucose tolerance) 1/12/2016    Joint pain     Multinodular goiter 1/12/2016     Re-Evaluation Date: 12/12/17  Total 2017 Visit #: 6  2018 Visit #: 9  Extended POC Expiration: 01/30/18  Extended POC Expiration: 02/27/18  Precautions: universal, visual impairment, impaired memory      G codes 8/10             Subjective   Pt reports: that she's feeling good this morning  Pain Scale:  2/10 L sided neck pain    Objective     Patient received individual therapy with activities as follows:     Selma received the following manual therapy techniques were applied for 15 minutes.      Supine:              PROM lower cervical rotation L<>R x 10x each direction with stretch held at end range; lower cervical flexion x 10              PROM upper cervical extension <> flexion; increased stiffness noted              3 x 30" B UT stretch                Therapeutic exercises to increase strength and endurance x 30  min including;    X 10 min on Nu Step recumbent stepper .  Level 5.0 for improved CV endurance.     X 10 supine deep neck flexor, 3" hold    3 x 10 B SLRs; 2.0 # LLE, 2.0# cuff RLE  X 10 supine bridges c/ 3" hold    Moist heat applied to cervical region x 10 minutes during supine therex; no adverse effects noted post intervention    Written Home Exercises: Continue HEP   Supine Quad sets, SLR, bridges, hip abd/add, Heel slides  Pt demo good understanding of the education provided. Selma demonstrated good return demonstration of activities.     Education provided re: " POC, HEP  No spiritual or educational barriers to learning provided    Pt has no cultural, educational or language barriers to learning provided.    Assessment   Selma tolerated therapy session poor this morning due to c/o significant L sided neck pain and stomach ache. Pt reports improved stiffness following manual stretching, but pain still persistent. Unable to perform standing therex or balance activities today due to c/o stomach pain. Pt to benefit from continued PT intervention to further progress remaining balance and ambulation deficits. Once current POC has ended for neuro PT, pt will benefit from PT with ortho team to address cervical pain and upper quadrant deficits.     Pt rehab potential is Good. Pt will benefit from continuing skilled outpatient physical therapy to address the deficits listed below in the problem list, provide pt/family education and to maximize pt's level of independence in the home and community environment.      History  Co-morbidities and personal factors that may impact the plan of care Examination  Body Structures and Functions, activity limitations and participation restrictions that may impact the plan of care. Medical necessity is demonstrated by the following impairments. Clinical Presentation Decision Making/ Complexity Score   1. OA of multiple joints  2. LBP  3. Visual deficits  4. Cognitive deficits  5. B rotator cuff repair  6. Vascular dementia 1. Decreased balance  2. Gait deficits  3. Increased fall risk  4. Decreased LE strength  5. Impaired endurance  6. Postural deficits evolving-unpredictable symptom presentation, cognitive deficits     high high moderate moderate         Pt's spiritual, cultural and educational needs considered and pt agreeable to plan of care and goals as stated below:      GOALS:   Short term goals: 4 weeks, pt agrees to goals set.  7. Pt to report performing HEP daily to increased IND. MET  8. Pt to demonstrate nadiya hip flexion MMT score to  4/5 to improve functional mobility. MET  9. Pt to demonstrate L hip abduction MMT score to 4/5 to improve functional mobility. MET  10. Pt to improve L knee extension MMT score to 4/5 to improve functional mobility MET  11. Pt to improve L knee flexion MMT score to 4/5 to improve functional mobility MET  12. Pt to improve chair rise score to at least 8 with no hands for improved endurance with functional mobility MET  13. Pt to improve MCTSIB condition 4 score to at least 15 seconds for improved balance and decreased fall risk. MET  14. Pt to improve TUG score with SPC to no more than 16 seconds for improved safety with ambulation MET  15. Pt to improve SSWS to at least 0.75 m/sec for improved safety with community ambulation. MET     Long term goals: 8 weeks, pt agrees to goals set  11. Pt to demonstrate more upright standing/seated head/shoulder posture to improve balance.  12. Pt to improve SSWS to at least 0.86m/sec for improved safety with community ambulation.   13. Pt to demonstrate nadiya hip flexion strength of 4+/5 to improve functional mobility.  14. Pt to demonstrate L hip abduction strength to 4+/5 to improve functional mobility.  16. Pt to improve L knee extension MMT score to 4/+5 to improve functional mobility MET  17. Pt to improve L knee flexion MMT score to 4+/5 to improve functional mobility  18. Pt to improve chair rise score to at least 9 with no hands for improved endurance with functional mobility MET  19. Pt to improve MCTSIB condition 4 score to at least 20 seconds for improved balance and decreased fall risk. MET  20. Pt to improve TUG score with SPC to no more than 14 seconds for improved safety with ambulation MET  21. Pt to improve FOTO score to at least 60% for improved self concept with functional mobility.     Plan   Outpatient physical therapy 2 times weekly to include: Pt Education, HEP, therapeutic exercises, neuromuscular re-education, therapeutic activities, manual therapy, joint  mobilizations, aquatic therapy and modalities PRN to achieve established goals. Pt may be seen by PTA as part of the rehabilitation team.      Cont PT for  8 weeks.     Kayleen Bañuelos, PT   02/06/2018

## 2018-02-06 NOTE — TELEPHONE ENCOUNTER
Staff called patient to advise of cancellation of appointment 02/14/2018, no answer, left voicemail message

## 2018-02-06 NOTE — TELEPHONE ENCOUNTER
..Patient referred by Dr. Lee for Health coaching (healthy eating s/p stroke, lifestyle changes).  Called patient and gave an explanation about Health Coaching program and invited participation.   Patient states she has an appt with the dietician and has not been told is not covered by insurance. She states she will continue plan with appt unless someone calls her to say is not covered.  Gave direct contact number to call if he/ she has any questions or would like to make appt.  [phone].   Vania Myers RN  Health

## 2018-02-07 ENCOUNTER — OFFICE VISIT (OUTPATIENT)
Dept: PSYCHIATRY | Facility: CLINIC | Age: 83
End: 2018-02-07
Payer: MEDICARE

## 2018-02-07 VITALS
WEIGHT: 156.19 LBS | SYSTOLIC BLOOD PRESSURE: 136 MMHG | BODY MASS INDEX: 25.99 KG/M2 | DIASTOLIC BLOOD PRESSURE: 77 MMHG | HEART RATE: 90 BPM

## 2018-02-07 DIAGNOSIS — F43.21 ADJUSTMENT DISORDER WITH DEPRESSED MOOD: Primary | ICD-10-CM

## 2018-02-07 PROCEDURE — 90792 PSYCH DIAG EVAL W/MED SRVCS: CPT | Mod: PBBFAC | Performed by: INTERNAL MEDICINE

## 2018-02-07 PROCEDURE — 99999 PR PBB SHADOW E&M-EST. PATIENT-LVL III: CPT | Mod: PBBFAC,,, | Performed by: INTERNAL MEDICINE

## 2018-02-07 PROCEDURE — 90792 PSYCH DIAG EVAL W/MED SRVCS: CPT | Mod: S$PBB,,, | Performed by: INTERNAL MEDICINE

## 2018-02-07 PROCEDURE — 99213 OFFICE O/P EST LOW 20 MIN: CPT | Mod: PBBFAC | Performed by: INTERNAL MEDICINE

## 2018-02-07 NOTE — PROGRESS NOTES
"OUTPATIENT PSYCHIATRY INITIAL VISIT    ENCOUNTER DATE:  2/8/2018  SITE:  [facility], [facility]  REFFERAL SOURCE:  No ref. 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  She saw Neuropsychology, Dr. Adrien Lagos, on 9/12/17 for testing, which showed Adjustment disorder with depressed mood and Mild Vascular Dementia.  Patient was started on Zoloft 25mg daily by Dr. Lee on 12/29/17.  She saw Dr. Rubio with Neurology on 1/17/18 and Valium was changed from 2mg BID to 2mg qHS for chorea.  She was also awaiting Namenda titration pack.    History as told by patient:  Daughter lives in [place].  Feels daughter was concerned for her mental health but she is not sure about specifics.  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  Graduated from [employer].  Stopped practicing in her early 70's.  Says she is unsure when she had the stroke.  States they noticed hemichorea in 9/2017.  Describes herself as Type A personality - always has a plan going on.  Has lived alone for the last 10 years.  Denies depressed mood.  When asked about mood, says "I've never thought about it.  I probably felt down at one point."  Says she was staying at home more - says she was not depressed but was not acting herself.  Says she was always a social person but just wasn't going out as much.  Says her sister and family noticed it early.  Sleep is "alright."  Watches TV at night.  Thinks she has been eating "alright."  Has some disappointment about decisions she made in last 20 years.  Says she let her clinic "get away from her."  Daughter was running the clinic and she didn't give " "her the support she needed.  Says that sometimes she thinks about it and it bothers her.  Denies previous psychiatric diagnoses.  Saw therapist in divorce but none since.  Never been on psychiatric medication until Zoloft was started recently.  Says she was on Valium 1mg at one point she believes.  Denies history of anxiety - says she took Valium when "I had run myself down."  Currently takes Valium 2mg qHS for hemichorea - unsure if it is helpful for the chorea but believes it helps her sleep.  Says "I lived a fast life" when asked about history of anxiety.  Denies worrying, catastophizing.  Denies panic attacks.  Then says "once you fall down and stumble you could always help."  Says she is unsure why she was started on Zoloft.  Believes it was for depression.  Feels that is natural as you age.  Says she lived well and was very social.  Per notes, was started on Zoloft 25mg daily by PCP on 17 due to concern for depression or anxiety.  Patient has not noticed a difference with Zoloft, although says she never felt depressed or anxious.  Does describe some "disinterest."  Says main social outlet was boyfriend she dated for 4-5 years - broke up around  to  so has not had someone to do things with.  Denies that she misses this.  Says "I am not wishing for it.  If I was wishing for it I would go do it."  Does say that she feels her mood has been better recently.  Started back singing.  Has a small concert coming up on  and she is very excited about this.  Also excited about [employer] - she and her friend have been going through old clothes/shoes.  Says, "I feel like I went into a sleep over the last few years but I've awakened myself."  Denies history of SI, HI, an, or psychosis.  Friend from [place] is here for 1 month.  Sister lives here - relationship is "better now, decent" - lives next door.  Has 2 daughters who live in [place] and [place].   -   in .  Came back to " [place] for mother who was still living.      Medication side effects:  No  Medication compliance:  Yes    PSYCHIATRIC REVIEW OF SYSTEMS:  Trouble with sleep:  Valium is helpful  Appetite changes:  Denies  Weight changes:  Denies  Lack of energy:  Denies  Anhedonia:  Denies  Somatic symptoms:  Denies  Libido:  Not discussed  Anxiety/panic:  Denies  Guilty/hopeless:  Denies  Self-injurious behavior/risky behavior:  Denies  Any drugs:  Denies  Alcohol:  Occasional    MEDICAL REVIEW OF SYSTEMS:  Complete review of systems performed covering Constitutional, Eyes, ENT/Mouth, Cardiovascular, Respiratory, Gastrointestinal, Genitourinary, Musculoskeletal, Skin, Neurologic, Endocrine, and Allergy/Immune.  All systems negative except for that discussed in HPI.    PAST PSYCHIATRIC HISTORY:  Previous Psychiatric Diagnoses:  Denies  Previous Psychiatric Hospitalizations:  Denies   Previous SI/HI:  Denies  Previous Suicide Attempts:  Denies   Previous Medication Trials:  Zoloft, Valium  Psychiatric Care (current & past):  Denies  History of Psychotherapy:  Some therapy when going through divorce  History of Violence:  Denies    SUBSTANCE ABUSE HISTORY:  Tobacco:  Denies  Alcohol:  Occasional  Illicit Substances:  Denies  Misuse of Prescription Medications:  Denies    MEDICAL HISTORY:  Past Medical History:   Diagnosis Date    Anemia     Arthritis     Hashimoto's disease     Hypertension     IGT (impaired glucose tolerance) 1/12/2016    Joint pain     Multinodular goiter 1/12/2016     NEUROLOGIC HISTORY:  Seizures:  Denies  Head trauma:  Denies    SOCIAL HISTORY:  Developmental/Childhood:  Denies  History of Physical/Sexual Abuse:  Denies  Education:  MD  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, 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   Financial:  Retired   Relationship Status/Sexual Orientation:     Children:  2 daughers   Housing Status:  Lives alone  Christian:  Jehovah's witness - Judaism   History:  Denies   Access to Gun:   Denies   Legal History:  Denies    FAMILY HISTORY:  Psychiatric:  Denies       H/o suicide:  Denies    MEDICATIONS:    Current Outpatient Prescriptions:     amlodipine (NORVASC) 5 MG tablet, TAKE 1 TABLET DAILY, Disp: 90 tablet, Rfl: 2    baclofen (LIORESAL) 10 MG tablet, Take 1 tablet (10 mg total) by mouth 3 (three) times daily., Disp: 270 tablet, Rfl: 2    clopidogrel (PLAVIX) 75 mg tablet, Take 1 tablet (75 mg total) by mouth once daily., Disp: 90 tablet, Rfl: 1    deutetrabenazine (AUSTEDO) 9 mg Tab, Take 2 tablets by mouth 2 (two) times daily. Final titrating dose - needs initially titration pack from company, Disp: 120 tablet, Rfl: 11    diazePAM (VALIUM) 2 MG tablet, Take 1 tablet (2 mg total) by mouth every evening., Disp: 30 tablet, Rfl: 3    ferrous sulfate 325 (65 FE) MG EC tablet, Take 325 mg by mouth once daily. , Disp: , Rfl:     gabapentin (NEURONTIN) 300 MG capsule, Take 1-2 capsules (300-600 mg total) by mouth 3 (three) times daily., Disp: 540 capsule, Rfl: 1    hydrocodone-acetaminophen 5-325mg (NORCO) 5-325 mg per tablet, Take 1 tablet by mouth every 24 hours as needed for Pain., Disp: 30 tablet, Rfl: 0    hydroxychloroquine (PLAQUENIL) 200 mg tablet, TAKE 2 TABLETS ONCE DAILY, Disp: 180 tablet, Rfl: 1    linaclotide (LINZESS) 145 mcg Cap capsule, Take 1 capsule (145 mcg total) by mouth once daily., Disp: 30 capsule, Rfl: 0    memantine (NAMENDA XR) 7-14-21-28 mg C24k, Follow titration instructions 7 mg x1. 14 mg x1 week. 21 mg x1 week. 28 mg x1 week., Disp: 1 each, Rfl: 0    memantine 28 mg CSpX, Take 1 capsule (28 mg total) by mouth once daily. START after titration pack completed., Disp: 30 capsule, Rfl: 3    montelukast (SINGULAIR) 10 mg tablet, TAKE 1 TABLET EVERY EVENING, Disp: 90 tablet, Rfl: 2    omega 3-dha-epa-fish oil 250-350-1,000 mg Cap, Take 1 capsule by mouth 2 (two) times daily., Disp: 180 capsule, Rfl: 3    predniSONE (DELTASONE) 5 MG tablet, TAKE 2 TABLETS ONCE  "DAILY, Disp: 180 tablet, Rfl: 0    sertraline (ZOLOFT) 25 MG tablet, Take 1 tablet (25 mg total) by mouth once daily., Disp: 90 tablet, Rfl: 1    thiamine 100 MG tablet, Take 1 tablet (100 mg total) by mouth once daily., Disp: , Rfl:     valACYclovir (VALTREX) 1000 MG tablet, Take 1 tablet (1,000 mg total) by mouth 3 (three) times daily., Disp: 21 tablet, Rfl: 0    ALLERGIES:  Review of patient's allergies indicates:  No Known Allergies    PSYCHIATRIC EXAM:  Vitals:    02/07/18 0800   BP: 136/77   Pulse: 90   Weight: 70.9 kg (156 lb 3.2 oz)     Appearance:  Well groomed, appearing healthy and of stated age  Behavior:  Cooperative, pleasant, no psychomotor agitation or retardation  Speech:  Normal rate, rhythm, prosody, and volume  Mood:  "Good"  Affect:  Congruent  Thought Process:  Mostly linear, tangential at times but redirectable, logical, goal directed  Thought Content:  Negative for suicidal ideation, homicidal ideation, delusions or hallucinations.  Associations:  Intact  Memory:  Grossly Intact  Level of Consciousness/Orientation:  Grossly intact  Fund of Knowledge:  Good  Attention:  Good  Language:  Fluent, able to name abstract and concrete objects  Insight:  Fair  Judgment:  Intact  Psychomotor signs:  No involuntary movements or tremor  Gait:  Normal    RELEVANT LABS/STUDIES:  Lab Results   Component Value Date    WBC 8.97 12/01/2017    HGB 11.5 (L) 12/01/2017    HCT 37.2 12/01/2017    MCV 85 12/01/2017     12/01/2017     BMP  Lab Results   Component Value Date     12/01/2017    K 3.5 12/01/2017     12/01/2017    CO2 28 12/01/2017    BUN 10 12/01/2017    CREATININE 0.9 12/01/2017    CALCIUM 9.5 12/01/2017    ANIONGAP 10 12/01/2017    ESTGFRAFRICA >60 12/01/2017    EGFRNONAA >60 12/01/2017     Lab Results   Component Value Date    ALT 12 12/01/2017    AST 14 12/01/2017    ALKPHOS 63 12/01/2017    BILITOT 0.7 12/01/2017     Lab Results   Component Value Date    TSH 0.775 08/24/2017 " "    Lab Results   Component Value Date    HGBA1C 5.5 08/24/2017       IMPRESSION:    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    Status/Progress:  Based on the examination today, the patient's problem(s) is/are adequately but not ideally controlled.  New problems have been presented today.    Risk Parameters:  Patient reports no suicidal ideation  Patient reports no homicidal ideation  Patient reports no self-injurious behavior  Patient reports no violent behavior    DIAGNOSES:    ICD-10-CM ICD-9-CM   1. Adjustment disorder with depressed mood F43.21 309.0     PLAN:  · Patient denying most symptoms of anxiety or depression.  Concern that she is minimizing.  Patient vaguely talks about a time a few months ago when she "may have been down" but states she is now feeling better.  Denies that Zoloft has been helpful, however I suspect that this has contributed to her improvement in mood.  She is declining increasing the Zoloft any further.  She does not feel she needs psychiatric help but says that she enjoyed talking and may come back to talk.  She is declining referral for psychotherapy.  She agrees that I can speak with her daughter for further collateral.  She will tell her daughter to send me a [employer] message with her number and a good time to talk.  I will also send messages to her PCP and Neurologist.    · Patient has expressed understanding and agrees with plan.    RETURN TO CLINIC:  Follow-up in about 2 months (around 4/7/2018).     "

## 2018-02-07 NOTE — Clinical Note
"Dr. Lee and Dr. Rubio,  I wanted to touch base about this patient who I saw yesterday to establish care in Psychiatry.  She denied symptoms of depression or anxiety, however she did vaguely mention a time a few months ago when she "may have been down."  She says she is feeling better now and declined increasing the Zoloft or seeing a therapist.  She did say she enjoyed talking and would come back again to talk.  Based on reading notes, I imagine she is minimizing her symptoms however I wanted to touch base with y'all to get further collateral.  She said her daughter would reach out to me as well through [facility].    Thanks so much, Alyssa Chu"

## 2018-02-08 ENCOUNTER — CLINICAL SUPPORT (OUTPATIENT)
Dept: REHABILITATION | Facility: HOSPITAL | Age: 83
End: 2018-02-08
Attending: FAMILY MEDICINE
Payer: MEDICARE

## 2018-02-08 DIAGNOSIS — Z74.09 IMPAIRED FUNCTIONAL MOBILITY, BALANCE, GAIT, AND ENDURANCE: ICD-10-CM

## 2018-02-08 PROCEDURE — G8978 MOBILITY CURRENT STATUS: HCPCS | Mod: CK,PO

## 2018-02-08 PROCEDURE — G8979 MOBILITY GOAL STATUS: HCPCS | Mod: CK,PO

## 2018-02-08 PROCEDURE — 97140 MANUAL THERAPY 1/> REGIONS: CPT | Mod: PO

## 2018-02-08 PROCEDURE — 97110 THERAPEUTIC EXERCISES: CPT | Mod: PO

## 2018-02-08 PROCEDURE — 97112 NEUROMUSCULAR REEDUCATION: CPT | Mod: PO

## 2018-02-08 NOTE — PROGRESS NOTES
"                                                    Physical Therapy Progress Note     Name: Selma Alonzo Lux MD  Clinic Number: [id number]  Diagnosis:   Encounter Diagnosis   Name Primary?    Impaired functional mobility, balance, gait, and endurance      Physician: Dee Thomson, *  Treatment Orders: PT Eval and Treat  Past Medical History:   Diagnosis Date    Anemia     Arthritis     Hashimoto's disease     Hypertension     IGT (impaired glucose tolerance) 1/12/2016    Joint pain     Multinodular goiter 1/12/2016     Re-Evaluation Date: 12/12/17  Total 2017 Visit #: 6  2018 Visit #: 10  Extended POC Expiration: 01/30/18  Extended POC Expiration: 02/27/18  Precautions: universal, visual impairment, impaired memory      G codes 1/10             Subjective   Pt reports: that she's feeling good this morning but that the L side of her neck was really hurting this morning; she took Tylenol and put heat on her neck which helped.   Pain Scale:  4/10 L sided neck pain    Objective     Patient received individual therapy with activities as follows:     Selma received the following manual therapy techniques were applied for 15 minutes.      Supine:              PROM lower cervical rotation L<>R x 10x each direction with stretch held at end range; lower cervical flexion x 10              PROM upper cervical extension <> flexion; increased stiffness noted; increased muscle guarding noted              3 x 30" B UT stretch   Grades II-III R UPAs at C3, C4, C5, C6                Therapeutic exercises to increase strength and endurance x 20  min including;    X 10 min on Nu Step recumbent stepper .  Level 5.0 for improved CV endurance.     X 10 supine deep neck flexor, 3" hold, TCs to perform correctly    X 10 forward step ups onto 6" block c/ each LE; 1 UE support     Patient participated in neuromuscular re-education activities to improve: Balance, Coordination, Proprioception and Posture for 10 minutes. The " "following activities were included:    4 pt fitter: CGA   X 30" static stance EO   X 30" static stance EC   X 30" L<>R head turns (difficulty dissociating cervical from thoracic movement)   X 30" vertical head turns    X 4 laps backward ambulation; 1 UE support, SBA  X 4 laps braiding R<>L, foot crossing over in front, 1 UE support, SBA    Functional Limitations Reports - G Codes  Category:  Mobiility  Tool:  FOTO for Cerebrovascular Disorders  Score: Status 50%  Current: CK at least 40% < 60% impaired, limited or restricted  Goal: CK at least 40% < 60% impaired, limited or restricted      Written Home Exercises: Continue HEP   Supine Quad sets, SLR, bridges, hip abd/add, Heel slides  Pt demo good understanding of the education provided. Selma demonstrated good return demonstration of activities.     Education provided re: POC, HEP  No spiritual or educational barriers to learning provided    Pt has no cultural, educational or language barriers to learning provided.    Assessment   Selma tolerated therapy session well this morning but continues to c/o significant L sided neck pain which has progressively gotten worse over past month. Pt reports improved stiffness following manual stretching, but L sided neck pain still persistent. Pt reports that she has an appointment tomorrow with primary care MD and plans to address neck pain with him at that time. Pt demonstrates improved static and dynamic balance, but does require 1 UE support for improved steadiness. Pt continues to demonstrate forward flexed posture during gait, but step length and BLE clearance during ambulation appeared improved. Once current POC for neuro PT has ended (for balance and ambulation deficits), pt will benefit from ortho PT to further address neck pain since this appears to be pt's primary complaint at this time.       Pt rehab potential is Good. Pt will benefit from continuing skilled outpatient physical therapy to address the deficits " listed below in the problem list, provide pt/family education and to maximize pt's level of independence in the home and community environment.      History  Co-morbidities and personal factors that may impact the plan of care Examination  Body Structures and Functions, activity limitations and participation restrictions that may impact the plan of care. Medical necessity is demonstrated by the following impairments. Clinical Presentation Decision Making/ Complexity Score   1. OA of multiple joints  2. LBP  3. Visual deficits  4. Cognitive deficits  5. B rotator cuff repair  6. Vascular dementia 1. Decreased balance  2. Gait deficits  3. Increased fall risk  4. Decreased LE strength  5. Impaired endurance  6. Postural deficits evolving-unpredictable symptom presentation, cognitive deficits     high high moderate moderate         Pt's spiritual, cultural and educational needs considered and pt agreeable to plan of care and goals as stated below:      GOALS:   Short term goals: 4 weeks, pt agrees to goals set.  7. Pt to report performing HEP daily to increased IND. MET  8. Pt to demonstrate nadiya hip flexion MMT score to 4/5 to improve functional mobility. MET  9. Pt to demonstrate L hip abduction MMT score to 4/5 to improve functional mobility. MET  10. Pt to improve L knee extension MMT score to 4/5 to improve functional mobility MET  11. Pt to improve L knee flexion MMT score to 4/5 to improve functional mobility MET  12. Pt to improve chair rise score to at least 8 with no hands for improved endurance with functional mobility MET  13. Pt to improve MCTSIB condition 4 score to at least 15 seconds for improved balance and decreased fall risk. MET  14. Pt to improve TUG score with SPC to no more than 16 seconds for improved safety with ambulation MET  15. Pt to improve SSWS to at least 0.75 m/sec for improved safety with community ambulation. MET     Long term goals: 8 weeks, pt agrees to goals set  11. Pt to  demonstrate more upright standing/seated head/shoulder posture to improve balance.  12. Pt to improve SSWS to at least 0.86m/sec for improved safety with community ambulation.   13. Pt to demonstrate nadiya hip flexion strength of 4+/5 to improve functional mobility.  14. Pt to demonstrate L hip abduction strength to 4+/5 to improve functional mobility.  16. Pt to improve L knee extension MMT score to 4/+5 to improve functional mobility MET  17. Pt to improve L knee flexion MMT score to 4+/5 to improve functional mobility  18. Pt to improve chair rise score to at least 9 with no hands for improved endurance with functional mobility MET  19. Pt to improve MCTSIB condition 4 score to at least 20 seconds for improved balance and decreased fall risk. MET  20. Pt to improve TUG score with SPC to no more than 14 seconds for improved safety with ambulation MET  21. Pt to improve FOTO score to at least 60% for improved self concept with functional mobility.     Plan   Outpatient physical therapy 2 times weekly to include: Pt Education, HEP, therapeutic exercises, neuromuscular re-education, therapeutic activities, manual therapy, joint mobilizations, aquatic therapy and modalities PRN to achieve established goals. Pt may be seen by PTA as part of the rehabilitation team.      Cont PT for  8 weeks.     Kayleen Bañuelos, PT   02/08/2018

## 2018-02-09 ENCOUNTER — OFFICE VISIT (OUTPATIENT)
Dept: INTERNAL MEDICINE | Facility: CLINIC | Age: 83
End: 2018-02-09
Attending: FAMILY MEDICINE
Payer: MEDICARE

## 2018-02-09 ENCOUNTER — NUTRITION (OUTPATIENT)
Dept: NUTRITION | Facility: CLINIC | Age: 83
End: 2018-02-09
Payer: MEDICARE

## 2018-02-09 VITALS — WEIGHT: 156.06 LBS | HEIGHT: 65 IN | BODY MASS INDEX: 26 KG/M2

## 2018-02-09 VITALS
BODY MASS INDEX: 26 KG/M2 | WEIGHT: 156.06 LBS | SYSTOLIC BLOOD PRESSURE: 120 MMHG | HEIGHT: 65 IN | DIASTOLIC BLOOD PRESSURE: 65 MMHG

## 2018-02-09 DIAGNOSIS — I10 ESSENTIAL HYPERTENSION: Primary | ICD-10-CM

## 2018-02-09 DIAGNOSIS — G25.5 HEMICHOREA: ICD-10-CM

## 2018-02-09 DIAGNOSIS — I63.9 CEREBROVASCULAR ACCIDENT (CVA), UNSPECIFIED MECHANISM: ICD-10-CM

## 2018-02-09 DIAGNOSIS — F01.50 VASCULAR DEMENTIA WITHOUT BEHAVIORAL DISTURBANCE: ICD-10-CM

## 2018-02-09 DIAGNOSIS — I63.9 CEREBROVASCULAR ACCIDENT (CVA), UNSPECIFIED MECHANISM: Primary | ICD-10-CM

## 2018-02-09 DIAGNOSIS — K27.9 PUD (PEPTIC ULCER DISEASE): ICD-10-CM

## 2018-02-09 DIAGNOSIS — R41.89 COGNITIVE IMPAIRMENT: ICD-10-CM

## 2018-02-09 DIAGNOSIS — I10 HYPERTENSION, ESSENTIAL: ICD-10-CM

## 2018-02-09 DIAGNOSIS — M05.79 SEROPOSITIVE RHEUMATOID ARTHRITIS OF MULTIPLE SITES: ICD-10-CM

## 2018-02-09 DIAGNOSIS — Z71.3 DIETARY COUNSELING: ICD-10-CM

## 2018-02-09 DIAGNOSIS — M15.9 PRIMARY OSTEOARTHRITIS INVOLVING MULTIPLE JOINTS: ICD-10-CM

## 2018-02-09 DIAGNOSIS — G31.84 MILD COGNITIVE IMPAIRMENT: ICD-10-CM

## 2018-02-09 DIAGNOSIS — K58.2 IRRITABLE BOWEL SYNDROME WITH BOTH CONSTIPATION AND DIARRHEA: ICD-10-CM

## 2018-02-09 DIAGNOSIS — I67.9 SMALL VESSEL DISEASE, CEREBROVASCULAR: ICD-10-CM

## 2018-02-09 DIAGNOSIS — Z74.09 IMPAIRED FUNCTIONAL MOBILITY, BALANCE, GAIT, AND ENDURANCE: ICD-10-CM

## 2018-02-09 DIAGNOSIS — M47.812 OSTEOARTHRITIS OF CERVICAL SPINE, UNSPECIFIED SPINAL OSTEOARTHRITIS COMPLICATION STATUS: ICD-10-CM

## 2018-02-09 DIAGNOSIS — R26.9 ABNORMALITY OF GAIT AND MOBILITY: ICD-10-CM

## 2018-02-09 PROCEDURE — 1126F AMNT PAIN NOTED NONE PRSNT: CPT | Mod: ,,, | Performed by: FAMILY MEDICINE

## 2018-02-09 PROCEDURE — 99999 PR PBB SHADOW E&M-EST. PATIENT-LVL III: CPT | Mod: PBBFAC,,,

## 2018-02-09 PROCEDURE — 99213 OFFICE O/P EST LOW 20 MIN: CPT | Mod: PBBFAC,27 | Performed by: FAMILY MEDICINE

## 2018-02-09 PROCEDURE — 97802 MEDICAL NUTRITION INDIV IN: CPT | Mod: 59,PBBFAC | Performed by: DIETITIAN, REGISTERED

## 2018-02-09 PROCEDURE — 99215 OFFICE O/P EST HI 40 MIN: CPT | Mod: S$PBB,,, | Performed by: FAMILY MEDICINE

## 2018-02-09 PROCEDURE — 1159F MED LIST DOCD IN RCRD: CPT | Mod: ,,, | Performed by: FAMILY MEDICINE

## 2018-02-09 PROCEDURE — 99213 OFFICE O/P EST LOW 20 MIN: CPT | Mod: PBBFAC

## 2018-02-09 PROCEDURE — 99999 PR PBB SHADOW E&M-EST. PATIENT-LVL III: CPT | Mod: PBBFAC,,, | Performed by: FAMILY MEDICINE

## 2018-02-09 RX ORDER — HYDROCODONE BITARTRATE AND ACETAMINOPHEN 5; 325 MG/1; MG/1
1 TABLET ORAL
Qty: 30 TABLET | Refills: 0 | Status: ON HOLD | OUTPATIENT
Start: 2018-02-09 | End: 2018-05-01

## 2018-02-09 RX ORDER — CLOPIDOGREL BISULFATE 75 MG/1
75 TABLET ORAL DAILY
Qty: 90 TABLET | Refills: 1 | Status: SHIPPED | OUTPATIENT
Start: 2018-02-09 | End: 2018-03-16 | Stop reason: SDUPTHER

## 2018-02-09 NOTE — Clinical Note
Patient has cerebrovasc disease and her neurologist recommended anti-platelet. Cardiologist though plavix would be better than ASA - she has a history of PUD and an abnormal EGD - what are your thoughts?  Thank you. Bhargav Lee

## 2018-02-09 NOTE — PROGRESS NOTES
"Referring Physician:Bhargav Lee MD     Reason for visit:  Chief Complaint   Patient presents with    Hypertension    Nutrition Counseling    Cerebrovascular Accident        :1935     Allergies Reviewed  Meds Reviewed    Anthropometrics  Weight:70.8 kg (156 lb 1.4 oz)  Height:5' 5" (1.651 m)  BMI:Body mass index is 25.97 kg/m².   IBW:   56.8 kg    Meds:  Outpatient Medications Prior to Visit   Medication Sig Dispense Refill    amlodipine (NORVASC) 5 MG tablet TAKE 1 TABLET DAILY 90 tablet 2    baclofen (LIORESAL) 10 MG tablet Take 1 tablet (10 mg total) by mouth 3 (three) times daily. 270 tablet 2    clopidogrel (PLAVIX) 75 mg tablet Take 1 tablet (75 mg total) by mouth once daily. 90 tablet 1    deutetrabenazine (AUSTEDO) 9 mg Tab Take 2 tablets by mouth 2 (two) times daily. Final titrating dose - needs initially titration pack from company 120 tablet 11    diazePAM (VALIUM) 2 MG tablet Take 1 tablet (2 mg total) by mouth every evening. 30 tablet 3    ferrous sulfate 325 (65 FE) MG EC tablet Take 325 mg by mouth once daily.       gabapentin (NEURONTIN) 300 MG capsule Take 1-2 capsules (300-600 mg total) by mouth 3 (three) times daily. 540 capsule 1    hydrocodone-acetaminophen 5-325mg (NORCO) 5-325 mg per tablet Take 1 tablet by mouth every 24 hours as needed for Pain. 30 tablet 0    hydroxychloroquine (PLAQUENIL) 200 mg tablet TAKE 2 TABLETS ONCE DAILY 180 tablet 1    linaclotide (LINZESS) 145 mcg Cap capsule Take 1 capsule (145 mcg total) by mouth once daily. 30 capsule 0    memantine (NAMENDA XR) 7-14-21-28 mg C24k Follow titration instructions 7 mg x1. 14 mg x1 week. 21 mg x1 week. 28 mg x1 week. 1 each 0    memantine 28 mg CSpX Take 1 capsule (28 mg total) by mouth once daily. START after titration pack completed. 30 capsule 3    montelukast (SINGULAIR) 10 mg tablet TAKE 1 TABLET EVERY EVENING 90 tablet 2    omega 3-dha-epa-fish oil 250-350-1,000 mg Cap Take 1 capsule by mouth " 2 (two) times daily. 180 capsule 3    predniSONE (DELTASONE) 5 MG tablet TAKE 2 TABLETS ONCE DAILY 180 tablet 0    sertraline (ZOLOFT) 25 MG tablet Take 1 tablet (25 mg total) by mouth once daily. 90 tablet 1    thiamine 100 MG tablet Take 1 tablet (100 mg total) by mouth once daily.      valACYclovir (VALTREX) 1000 MG tablet Take 1 tablet (1,000 mg total) by mouth 3 (three) times daily. 21 tablet 0     No facility-administered medications prior to visit.          Labs:   12/01/17 CMP wnl     Estimated Nutrition Needs:   1775 Kcals/day( 25 kcal/kg),    57 g protein( 0.8 g/kg)     Diet Hx:   Pt & friend present for encounter.  Pt is interested in heart healthy diet regimen.  Uses a cane.  Pt has assistance with grocery shopping and cooking at home.  Is eating 3 meals/day, with minimal between meal snacking.  Enjoys sweets.      Assessment:   Pt & friend attentive and asked relevant questions about foods recommended & to avoid; reading food labels for sodium content; grocery shopping tips.  All questions answered, and they verbalized understanding of information.  Pt voiced satisfaction with current diet regimen at home being in adherence with today's recommendations.    Nutrition Diagnosis:   Food- and nutrition-related knowledge deficit RT lack of prior exposure to information AEB recent stroke    Recommendations:   Low sodium diet.  Handouts provided & reviewed:  Stroke Nutrition Therapy; Sodium-free Flavoring Tips; My Plate Planner; Reading a Food Label      Consultation Time:45 minutes.    Follow Up:  Pt provided with dietitian contact number and advised to call with questions or make future appointment if further intervention needed.

## 2018-02-09 NOTE — PROGRESS NOTES
Subjective:       Patient ID: Selma Rosado MD Jose J is a 82 y.o. female.    Chief Complaint: Follow-up    HPI  Review of Systems   Constitutional: Positive for fatigue. Negative for chills and fever.   HENT: Negative for congestion and trouble swallowing.    Eyes: Negative for redness.   Respiratory: Negative for cough, chest tightness and shortness of breath.    Cardiovascular: Negative for chest pain, palpitations and leg swelling.   Gastrointestinal: Negative for abdominal pain and blood in stool.   Genitourinary: Negative for hematuria and menstrual problem.   Musculoskeletal: Positive for arthralgias, back pain, gait problem, myalgias, neck pain and neck stiffness. Negative for joint swelling.   Skin: Negative for color change and rash.   Neurological: Positive for weakness. Negative for tremors, speech difficulty, numbness and headaches.   Hematological: Negative for adenopathy. Does not bruise/bleed easily.   Psychiatric/Behavioral: Positive for decreased concentration. Negative for behavioral problems, confusion and sleep disturbance. The patient is not nervous/anxious.        Objective:      Physical Exam   Constitutional: She is oriented to person, place, and time. She appears well-developed and well-nourished. No distress.   HENT:   Right Ear: Tympanic membrane, external ear and ear canal normal.   Left Ear: Tympanic membrane, external ear and ear canal normal.   Neck: Neck supple.   Pulmonary/Chest: Effort normal.   Musculoskeletal: She exhibits no edema.        Cervical back: She exhibits tenderness and spasm.        Right lower leg: She exhibits no edema.        Left lower leg: She exhibits no edema.   Neurological: She is alert and oriented to person, place, and time. Coordination and gait abnormal. GCS eye subscore is 4. GCS verbal subscore is 5. GCS motor subscore is 6.   Skin: Skin is warm and dry. No rash noted.   Psychiatric: She has a normal mood and affect. Her behavior is normal. Judgment  and thought content normal.   Nursing note and vitals reviewed.      Assessment:       1. Cerebrovascular accident (CVA), unspecified mechanism    2. Small vessel disease, cerebrovascular    3. Abnormality of gait and mobility    4. Cognitive impairment    5. Osteoarthritis of cervical spine, unspecified spinal osteoarthritis complication status    6. Hemichorea    7. Hypertension, essential    8. Irritable bowel syndrome with both constipation and diarrhea    9. Impaired functional mobility, balance, gait, and endurance    10. Mild cognitive impairment    11. Primary osteoarthritis involving multiple joints    12. PUD (peptic ulcer disease)    13. Seropositive rheumatoid arthritis of multiple sites    14. Vascular dementia without behavioral disturbance        Plan:   Selma was seen today for follow-up.    Diagnoses and all orders for this visit:    Cerebrovascular accident (CVA), unspecified mechanism  -     Ambulatory referral to Cardiology    Small vessel disease, cerebrovascular  -     Ambulatory referral to Cardiology    Abnormality of gait and mobility    Cognitive impairment    Osteoarthritis of cervical spine, unspecified spinal osteoarthritis complication status    Hemichorea    Hypertension, essential    Irritable bowel syndrome with both constipation and diarrhea    Impaired functional mobility, balance, gait, and endurance    Mild cognitive impairment    Primary osteoarthritis involving multiple joints    PUD (peptic ulcer disease)    Seropositive rheumatoid arthritis of multiple sites    Vascular dementia without behavioral disturbance    Other orders  -     hydrocodone-acetaminophen 5-325mg (NORCO) 5-325 mg per tablet; Take 1 tablet by mouth every 24 hours as needed for Pain.  -     omega 3-dha-epa-fish oil 250-350-1,000 mg Cap; Take 1 capsule by mouth 2 (two) times daily.  -     clopidogrel (PLAVIX) 75 mg tablet; Take 1 tablet (75 mg total) by mouth once daily.      See meds, orders, follow up,  routing and instructions sections of encounter.  HISTORY OF PRESENT ILLNESS:  The patient was in for a conversation concerning   her multiple chronic conditions, we had a very lengthy discussion concerning   depression.  She does not feel like she is depressed.  She is not currently   taking her Zoloft.  She met with Dr. Chu yesterday, I did review those notes.    She was from out of town.  She moved back to take care of her mother   approximately 10 years ago.  She has two daughters.  She mentioned a person who   is staying with her right now.  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  This young lady got a   GED, went to college and subsequently went on to have a great career.  The   patient's  just retired and is spending some time watching over at this   time.    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  It has been evaluated in   PMNR.  No plausible explanation other than DJD has come up with that.  I did   examine her ears, nose, throat today and no palpable lesions were noted.  No   Spurling's maneuver.  Considerations include cervical spondylosis, etc.  She has   had imaging of the vertebral and carotid arteries.    The patient has a sister who lives next door that is somewhat of a support at   work.  The patient describes some lack of things to do.  She does not feel like   she is currently depressed.    On examination today, she appears to be less mobile.  She appears lucid.  I did   discuss the fish oil and Plavix, which she did not start yet.  In reviewing her   chart, there was an endoscopy that showed an ulcer.  I asked the patient to hold   off on her Plavix until I can discuss this with her gastroenterologist, which I   will message concerning this.  I also had recommended visit with Dr. Simpson, one   of our lipid experts, who she may see.    I  would like her to follow up with Dr. Lux in approximately three months.  She   agreed to do so.  She has some other appointments in the meantime.    Background, she had a stroke apparently over the summer.  She has multiinfarct   dementia and small vessel cerebrovascular disease.  Dr. Tobias had recommended   starting Brilinta but did not do so.  I discussed this briefly with Dr. Jeffries   who suggested Plavix if there  were no contraindications, and fish oil.  The   patient's lipids were quite good last year and we had discussion concerning the   necessity of placing her on a statin.  She did not have appreciable carotid   stenosis.    For the time being, we do recommend the fish oil tablets,  hold Plavix until I   can get  further advice from her gastroenterologist and further recommendations   to follow.  I did clean up her medication list at this time and we went over   those in great detail.    Today's appt was 1 hour in length with >50% of time spent in counselling.      SELENA/ALLAN  dd: 02/09/2018 19:29:27 (CST)  td: 02/10/2018 02:05:38 (CST)  Doc ID   #[id number]  Job ID #[id number]    CC:

## 2018-02-10 PROBLEM — R41.89 COGNITIVE IMPAIRMENT: Status: RESOLVED | Noted: 2017-12-06 | Resolved: 2018-02-10

## 2018-02-10 PROBLEM — R53.81 DEBILITY: Status: RESOLVED | Noted: 2017-11-08 | Resolved: 2018-02-10

## 2018-02-12 ENCOUNTER — LAB VISIT (OUTPATIENT)
Dept: LAB | Facility: HOSPITAL | Age: 83
End: 2018-02-12
Attending: INTERNAL MEDICINE
Payer: MEDICARE

## 2018-02-12 ENCOUNTER — OFFICE VISIT (OUTPATIENT)
Dept: RHEUMATOLOGY | Facility: CLINIC | Age: 83
End: 2018-02-12
Payer: MEDICARE

## 2018-02-12 VITALS
BODY MASS INDEX: 26.6 KG/M2 | DIASTOLIC BLOOD PRESSURE: 73 MMHG | SYSTOLIC BLOOD PRESSURE: 131 MMHG | HEART RATE: 80 BPM | WEIGHT: 159.69 LBS | HEIGHT: 65 IN

## 2018-02-12 DIAGNOSIS — M15.4 EROSIVE OSTEOARTHRITIS OF LEFT HAND: ICD-10-CM

## 2018-02-12 DIAGNOSIS — M05.9 SEROPOSITIVE RHEUMATOID ARTHRITIS: ICD-10-CM

## 2018-02-12 DIAGNOSIS — M25.542 ARTHRALGIA OF HAND, LEFT: Primary | ICD-10-CM

## 2018-02-12 DIAGNOSIS — M25.542 ARTHRALGIA OF HAND, LEFT: ICD-10-CM

## 2018-02-12 LAB
CRP SERPL-MCNC: 70.5 MG/L
ERYTHROCYTE [SEDIMENTATION RATE] IN BLOOD BY WESTERGREN METHOD: 61 MM/HR

## 2018-02-12 PROCEDURE — 81374 HLA I TYPING 1 ANTIGEN LR: CPT | Mod: PO

## 2018-02-12 PROCEDURE — 86140 C-REACTIVE PROTEIN: CPT

## 2018-02-12 PROCEDURE — 36415 COLL VENOUS BLD VENIPUNCTURE: CPT

## 2018-02-12 PROCEDURE — 99999 PR PBB SHADOW E&M-EST. PATIENT-LVL III: CPT | Mod: PBBFAC,,, | Performed by: INTERNAL MEDICINE

## 2018-02-12 PROCEDURE — 99213 OFFICE O/P EST LOW 20 MIN: CPT | Mod: PBBFAC | Performed by: INTERNAL MEDICINE

## 2018-02-12 PROCEDURE — 99214 OFFICE O/P EST MOD 30 MIN: CPT | Mod: S$PBB,,, | Performed by: INTERNAL MEDICINE

## 2018-02-12 PROCEDURE — 85651 RBC SED RATE NONAUTOMATED: CPT

## 2018-02-12 PROCEDURE — 1125F AMNT PAIN NOTED PAIN PRSNT: CPT | Mod: ,,, | Performed by: INTERNAL MEDICINE

## 2018-02-12 PROCEDURE — 1159F MED LIST DOCD IN RCRD: CPT | Mod: ,,, | Performed by: INTERNAL MEDICINE

## 2018-02-12 ASSESSMENT — ROUTINE ASSESSMENT OF PATIENT INDEX DATA (RAPID3)
PAIN SCORE: 6
AM STIFFNESS SCORE: 1, YES
PSYCHOLOGICAL DISTRESS SCORE: 3.3
WHEN YOU AWAKENED IN THE MORNING OVER THE LAST WEEK, PLEASE INDICATE THE AMOUNT OF TIME IT TAKES UNTIL YOU ARE AS LIMBER AS YOU WILL BE FOR THE DAY: 30 MINUTES
TOTAL RAPID3 SCORE: 5.22
PATIENT GLOBAL ASSESSMENT SCORE: 8
FATIGUE SCORE: 3
MDHAQ FUNCTION SCORE: .5

## 2018-02-12 NOTE — PROGRESS NOTES
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    She has seen  last 6/2017  She takes plaquenil 200 mg bid and prednisone 5 mg daily    She had called us sep 2017 for hand symptoms and she was asked to take prednisone  She comes back with similar complaints  Left hand : thumb cant do seat belt  4th finger hurts  Using it makes it worse  Hurts at rest too  Prednisone helped  Since September symptoms started and hasnt stopped    In the past she had RF between 20 to 40 and negative CCP  She had high inflammatory markers  She had xray changes showing erosive osteoarthritis     She gets shoulder injections and sees pain management for back pain  Both her rotator cuffs are torn    Has IBS  Has anemia,PUD,fatty liver  She has hashimotos thyroiditis/goiter,low vit d   CVA    Medications :   norvasc 5 mg,baclofen 10 mg,valium 2 mg,iron,gabapentin 300 mg tid,narcotics,plaquenil,singulair 10 mg,prednisone 5 mg,linaclotide 145 mcg,thiamine,plavix,memantine,fish oils,austedo,zoloft 25 mg    Past history: as stated above    Family history: none    Social history : not a smoker and alcoholic     Review of Systems   Constitutional: Negative for activity change, appetite change, chills, diaphoresis, fatigue, fever and unexpected weight change.   HENT: Negative for congestion, dental problem, drooling, ear discharge, ear pain, facial swelling, hearing loss, mouth sores, nosebleeds, postnasal drip, rhinorrhea, sinus pain, sinus pressure, sneezing, sore throat, tinnitus, trouble swallowing and voice change.    Eyes: Negative for photophobia, pain, discharge, redness, itching and visual disturbance.   Respiratory: Negative for  apnea, cough, choking, chest tightness, shortness of breath, wheezing and stridor.    Cardiovascular: Negative for chest pain, palpitations and leg swelling.   Gastrointestinal: Negative for abdominal distention, abdominal pain, anal bleeding, blood in stool, constipation, diarrhea, nausea, rectal pain and vomiting.   Endocrine: Negative for cold intolerance, heat intolerance, polydipsia, polyphagia and polyuria.   Genitourinary: Negative for decreased urine volume, difficulty urinating, dysuria, enuresis, flank pain, frequency, genital sores, hematuria and urgency.   Musculoskeletal: Positive for arthralgias. Negative for back pain, gait problem, joint swelling, myalgias, neck pain and neck stiffness.   Skin: Negative for color change, pallor, rash and wound.   Allergic/Immunologic: Negative for environmental allergies, food allergies and immunocompromised state.   Neurological: Negative for dizziness, tremors, seizures, syncope, facial asymmetry, speech difficulty, weakness, light-headedness, numbness and headaches.   Hematological: Negative for adenopathy. Does not bruise/bleed easily.   Psychiatric/Behavioral: Negative for agitation, behavioral problems, confusion, decreased concentration, dysphoric mood, hallucinations, self-injury, sleep disturbance and suicidal ideas. The patient is not nervous/anxious and is not hyperactive.      Physical Exam     Tenderness:   Left hand: 1st MCP, 1st PIP, 2nd PIP, 4th PIP, 2nd DIP and 4th DIP    Swelling:   Left hand: 2nd PIP and 4th PIP    SHEIKH-28 tender joint count: 4  SHEIKH-28 swollen joint count: 2    Physical Exam   Constitutional: She is oriented to person, place, and time and well-developed, well-nourished, and in no distress. No distress.   HENT:   Head: Normocephalic.   Mouth/Throat: Oropharynx is clear and moist.   Eyes: Conjunctivae are normal. Pupils are equal, round, and reactive to light. Right eye exhibits no discharge. Left eye exhibits no discharge. No scleral  icterus.   Neck: Normal range of motion. No thyromegaly present.   Cardiovascular: Normal rate, regular rhythm, normal heart sounds and intact distal pulses.    Pulmonary/Chest: Effort normal and breath sounds normal. No stridor.   Abdominal: Soft. Bowel sounds are normal.   Lymphadenopathy:     She has no cervical adenopathy.   Neurological: She is alert and oriented to person, place, and time.   Skin: Skin is warm. No rash noted. She is not diaphoretic.     Psychiatric: Affect and judgment normal.   Musculoskeletal: Normal range of motion.       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 Arthralgia of hand, left    2. Erosive osteoarthritis of left hand    3. Seropositive rheumatoid arthritis        Reviewed labs/xrays  Reviewed medications    Ordered labs /xrays    I have independently reviewed her hand xrays    Follow up problem      Plan    Will continue plaquenil and prednisone same doses for now    Will get MRI left hand with and without    ESR,CRP today    Selma was seen today for appointment.    Diagnoses and all orders for this visit:    Arthralgia of hand, left  -     Sedimentation rate, manual; Future  -     C-reactive protein; Standing  -     HLA B27 Antigen; Future  -     MRI Hand Fingers W WO Contrast Left; Future    Erosive osteoarthritis of left hand    Seropositive rheumatoid arthritis        rtc in 2 weeks

## 2018-02-15 ENCOUNTER — CLINICAL SUPPORT (OUTPATIENT)
Dept: REHABILITATION | Facility: HOSPITAL | Age: 83
End: 2018-02-15
Attending: FAMILY MEDICINE
Payer: MEDICARE

## 2018-02-15 DIAGNOSIS — R41.841 COGNITIVE COMMUNICATION DEFICIT: ICD-10-CM

## 2018-02-15 DIAGNOSIS — R47.1 DYSARTHRIA: ICD-10-CM

## 2018-02-15 DIAGNOSIS — Z74.09 IMPAIRED FUNCTIONAL MOBILITY, BALANCE, GAIT, AND ENDURANCE: ICD-10-CM

## 2018-02-15 PROCEDURE — 97110 THERAPEUTIC EXERCISES: CPT | Mod: PO

## 2018-02-15 PROCEDURE — 97140 MANUAL THERAPY 1/> REGIONS: CPT | Mod: PO

## 2018-02-15 PROCEDURE — 97127 HC THERAPEUTIC INTVTN, COGN FUNCTION - ST: CPT | Mod: PO

## 2018-02-15 NOTE — PROGRESS NOTES
OUTPATIENT NEUROLOGICAL REHABILITATION  SPEECH THERAPY PROGRESS NOTE    Date:  2/15/2018     Start Time:  1040  Stop Time:  1115    Subjective/History  Onset Date:  Approximately 1 to 2 years ago  Primary Diagnosis:  Cognitive Impairment, Hemichorea, Debility  Treatment Diagnosis:    1. Cognitive communication deficit      2. Dysarthria         Referring Provider:  Dr. 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  Precautions:  Memory loss    TIMED  Procedure Min.    Cognitive Therapy  35         Total Minutes: 35  Total Timed Units: 2  Total Untimed Units: 0  Charges Billed/# of units: 2    Visit #: 15  Date of Evaluation:  12/19/17  Plan of Care Expiration:    02/16/18  Extended POC:    G-CODE   15/10     Progress/Current Status  Subjective:   Pain: 7/10  Pt was pleasant and motivated for therapy. C/o pain in her neck.   Objective:     Short Term Goals: (6 weeks) Current Progress:   1a. Dr. Lux will complete short term memory tasks (immediate) with 90% acc given IND'ly  -Pt recalled details of a drawn picture with 90% acc ind'ly, 100% given min verbal cues    MET X1   2a. Dr. Lux will complete short term memory tasks (delayed) with 90% acc given min cues. Pt recalled 3 words after a 5 minute delay with 100% acc given min verbal cues     MET X1     3a. Dr. Lux will complete mental flexibility/manipulation tasks with 90% acc IND'ly. -4 unit word placement recall: 80% acc ind'ly, 100% given repetitions   4a.  Dr. Lux will complex problem solving, reasoning and thought organization tasks with with 90% acc IND'ly - Complex reasoning/direction following task: 100% acc ind'ly MET X1          5a. Revised 2/6/18 - Duplicate goal of STG 5 previously: Dr. Lxu will list 15  to 20 items in an abstract categories.  Abstract: things that you turn on-8 indly     Goals Met:  1. Dr. Lux will complete short term memory tasks (immediate) with 90% acc given min cues over 3 consecutive sessions. Goal met/revise 1/23/18  2. Dr. Lux will complete short term memory tasks (delayed) with 90% acc given min cues over 3 consecutive sessions. Goal revised 1/23/18  3. Dr. Lux will complete mental flexibility/manipulation tasks with 90% acc given min cues over 3 consecutive sessions. Goal met/revise 1/23/18  4. Dr. Lux will problem solving, reasoning and thought organization tasks with with 90% acc given min cues over 3 consecutive sessions. Goal met/revise 1/23/18  5. Dr. Lux will list 15 to 20 items in concrete categories within one minute over 3 consecutive sessions. Goal Not Met / Revise  6. Dr. Lux will utilize motor speech strategies (loud, over articulated speech, good breath support) in speech tasks with 90% accuracy given min cues over 3 consecutive sessions.Goal Met/ Discontinue  Assessment:   Improvements across all areas noted today.    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 She presents with impaired short term memory, word finding, problem solving, thought organization, executive function, and mental flexibility skills. Pt would benefit from continued skilled ST. Prognosis for improvement remains Good     Functional Communication Measure (FCM):   Severity Modifier for Medicare G-Code:  Memory  UPDATED 1/23/18  Current status: FCM:  Level 5   - CJ  at least 20% < 40% impaired, limited or restricted  Projected status:  FCM: Level 6   - CI at least 1% < 20% impaired, limited or restricted     Other SLP Functional Limitation (Problem Solving) UPDATED 1/23/18  Current status: FCM: Level 5   - CJ at least 20% < 40% impaired, limited or restricted  Projected status:  FCM:  Level 6   - CI at least 1% < 20% impaired, limited or  "restricted     Patient Education/Response:   Educated pt on use of computers at [employer] to improve work efficiency at home.     Home Program: Deductive reasoning activity and Comparing Sentence Content activity worksheets given. Finish these activities for next session.    Plans and Goals:   Continue POC with focus on her cognitive-communicative deficits. Focus on executive function and memory for home environment.      Other Recommendations: None at this time      Sisi Baez, BS -  Clinician   2/15/2018      "I, XUAN Conti, CCC-SLP , certify that I was present in the room directing the student in service delivery and guiding them using my skilled judgement.  As the co-signing therapist, I have reviewed the student's documentation and am responsible for the treatment, assessment and plan."    XUAN Conti, CCC-SLP  Speech Language Pathologist    2/15/2018   "

## 2018-02-15 NOTE — PROGRESS NOTES
"OUTPATIENT NEUROLOGICAL REHABILITATION  SPEECH THERAPY PROGRESS NOTE    Date:  2/15/2018     Start Time:  ***  Stop Time:  ***    Subjective/History  Onset Date:  Approximately 1 to 2 years ago  Primary Diagnosis:  Cognitive Impairment, Hemichorea, Debility  Treatment Diagnosis:    1. Cognitive communication deficit      2. Dysarthria         Referring Provider:  Dr. 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  Precautions:  Memory loss    TIMED  Procedure Min.    Cognitive Therapy  45         UNTIMED  Procedure Min.                  Total Minutes: 45  Total Timed Units: 3  Total Untimed Units: 0  Charges Billed/# of units: 3    Visit #: 15  Date of Evaluation:  12/19/17  Plan of Care Expiration:    02/16/18  Extended POC:    G-CODE   15/10     Progress/Current Status  Subjective:   Pain: 0 /10    Pt was pleasant and motivated for therapy. Reports she feels she has "awaken herself" within the past few weeks since spending time with her daughters. Reported reduced independence with executive functioning skills/ memory.  Objective:     Short Term Goals: (6 weeks) Current Progress:   1a. Dr. Lux will complete short term memory tasks (immediate) with 90% acc given IND'ly  -Pt answered comprehension questions related to paragraph length material presented verbally with 100% acc ind.   2a. Dr. Lux will complete short term memory tasks (delayed) with 90% acc given min cues. Not formally addressed      3a. Dr. Lux will complete mental flexibility/manipulation tasks with 90% acc IND'ly. -Repeated 3 unit stimuli presented verbally in alphabetical order with 90% acc indly.   4a.  Dr. Lux will complex problem solving, reasoning and thought organization tasks with with 90% acc " IND'ly - Pt completed analogies with 100% acc indly              5a. Revised 2/6/18 - Duplicate goal of STG 5 previously: Dr. Lux will list 15 to 20 items in an abstract categories.  Abstract: things that are expensive-11 indly     Goals Met:  1. Dr. Lux will complete short term memory tasks (immediate) with 90% acc given min cues over 3 consecutive sessions. Goal met/revise 1/23/18  2. Dr. Lux will complete short term memory tasks (delayed) with 90% acc given min cues over 3 consecutive sessions. Goal revised 1/23/18  3. Dr. Lux will complete mental flexibility/manipulation tasks with 90% acc given min cues over 3 consecutive sessions. Goal met/revise 1/23/18  4. Dr. Lux will problem solving, reasoning and thought organization tasks with with 90% acc given min cues over 3 consecutive sessions. Goal met/revise 1/23/18  5. Dr. Lux will list 15 to 20 items in concrete categories within one minute over 3 consecutive sessions. Goal Not Met / Revise  6. Dr. Lux will utilize motor speech strategies (loud, over articulated speech, good breath support) in speech tasks with 90% accuracy given min cues over 3 consecutive sessions.Goal Met/ Discontinue  Assessment:   Pt speech 100% intelligible today. Improvements with reasoning skills noted today.    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 She presents with impaired short term memory, word finding, problem solving, thought organization, executive function, and mental flexibility skills. Pt would benefit from continued skilled ST. Prognosis for improvement remains Good     Functional Communication Measure (FCM):   Severity Modifier for Medicare G-Code:  Memory  UPDATED 1/23/18  Current status: FCM:  Level 5   - CJ  at least 20% < 40% impaired, limited or restricted  Projected status:  FCM: Level 6   - CI at least 1% < 20% impaired, limited or restricted     Other SLP Functional Limitation (Problem Solving) UPDATED  "1/23/18  Current status: FCM: Level 5   - CJ at least 20% < 40% impaired, limited or restricted  Projected status:  FCM:  Level 6   - CI at least 1% < 20% impaired, limited or restricted     Patient Education/Response:   Counseling provided re: reported memory deficits associated with stroke and impact on every day life. Educated pt on calendar use to recall daily to-do lists.    Home Program: Deductive reasoning activity and Comparing Sentence Content activity worksheets given. Finish these activities for next session.    Plans and Goals:   Continue POC with focus on her cognitive-communicative deficits. Focus on executive function and memory for home environment.      Other Recommendations: None at this time      Sisi Baez, BS -  Clinician   2/15/2018      "I, XUAN Conti, CCC-SLP , certify that I was present in the room directing the student in service delivery and guiding them using my skilled judgement.  As the co-signing therapist, I have reviewed the student's documentation and am responsible for the treatment, assessment and plan."    XUAN Conti, CCC-SLP  Speech Language Pathologist    2/15/2018   "

## 2018-02-16 ENCOUNTER — OUTPATIENT CASE MANAGEMENT (OUTPATIENT)
Dept: ADMINISTRATIVE | Facility: OTHER | Age: 83
End: 2018-02-16

## 2018-02-16 NOTE — PROGRESS NOTES
2/16/18  Chart reviewed. 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   CM to plan f/u call next week and close case.

## 2018-02-19 RX ORDER — PREDNISONE 5 MG/1
TABLET ORAL
Qty: 180 TABLET | Refills: 0 | Status: ON HOLD | OUTPATIENT
Start: 2018-02-19 | End: 2018-05-01 | Stop reason: HOSPADM

## 2018-02-20 ENCOUNTER — CLINICAL SUPPORT (OUTPATIENT)
Dept: REHABILITATION | Facility: HOSPITAL | Age: 83
End: 2018-02-20
Attending: FAMILY MEDICINE
Payer: MEDICARE

## 2018-02-20 ENCOUNTER — OUTPATIENT CASE MANAGEMENT (OUTPATIENT)
Dept: ADMINISTRATIVE | Facility: OTHER | Age: 83
End: 2018-02-20

## 2018-02-20 DIAGNOSIS — R47.1 DYSARTHRIA: ICD-10-CM

## 2018-02-20 DIAGNOSIS — R41.841 COGNITIVE COMMUNICATION DEFICIT: ICD-10-CM

## 2018-02-20 DIAGNOSIS — Z74.09 IMPAIRED FUNCTIONAL MOBILITY, BALANCE, GAIT, AND ENDURANCE: ICD-10-CM

## 2018-02-20 PROCEDURE — 97140 MANUAL THERAPY 1/> REGIONS: CPT | Mod: PO

## 2018-02-20 PROCEDURE — 97110 THERAPEUTIC EXERCISES: CPT | Mod: PO

## 2018-02-20 PROCEDURE — 97127 HC THERAPEUTIC INTVTN, COGN FUNCTION - ST: CPT | Mod: PO

## 2018-02-20 NOTE — PLAN OF CARE
"OUTPATIENT NEUROLOGICAL REHABILITATION  SPEECH THERAPY PROGRESS NOTE  UPDATED PLAN OF CARE    Date:  2/20/2018     Start Time:  10:30  Stop Time:  11:15    Subjective/History  Onset Date:  Approximately 1 to 2 years ago  Primary Diagnosis:  Cognitive Impairment, Hemichorea, Debility  Treatment Diagnosis:    1. Cognitive communication deficit      2. Dysarthria         Referring Provider:  Dr. 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  Precautions:  Memory loss    TIMED  Procedure Min.    Cognitive Therapy  45         Total Minutes: 45  Total Timed Units: 3  Total Untimed Units: 0  Charges Billed/# of units: 3    Visit #: 17  Date of Evaluation:  12/19/17  Plan of Care Expiration:    02/16/18  Extended POC:  04/06/18  G-CODE   17/10     Progress/Current Status  Subjective:   Pain: 0/10  Pt was motivated to complete all therapy tasks, and has made consistent progress.   Objective:     Short Term Goals: (4 weeks) Current Progress:   1a. Dr. Lux will complete short term memory tasks (immediate) with 90% acc given IND'ly      Overall: 96% acc. IND'ly   GOAL MET BUT WILL CONTINUE FOR MAINTENANCE     Pt reports still having trouble with memory in functional activities and at home.    2a. Dr. Lux will complete short term memory tasks (delayed) with 90% acc given min cues.     Overall: 93% acc IND'ly   GOAL MET BUT WILL CONTINUE FOR MAINTENANCE        3a. Dr. Lux will complete mental flexibility/manipulation tasks with 90% acc IND'ly. Overall: 88% acc IND'ly  GOAL INCONSISTENTLY MET - CONTINUE         4a.  Dr. Lux will complex problem solving, reasoning and thought organization tasks with with 90% acc IND'ly Overall: 96% acc IND"ly   GOAL MET      5a. Revised 2/6/18 - " Duplicate goal of STG 5 previously: Dr. Lux will list 15 to 20 items in an abstract categories.    Overall: 9 items   GOAL NOT MET - CONTINUE    NEW GOAL (2/20/18)   6. Dr. Lux will attest to utilizing 3/4 memory strategies at home (using a notebook/planner, writing down important tasks, use visual aids etc.) ind'ly during activities of daily living.         Assessment:      Jose J presents with mild impaired cognitive-communicative skills. She presents with impaired short term memory, word fluency, word finding, thought organization, executive function, and mental flexibility skills. Pt has met many of her goals and has made significant progress. However, pt reports that she still has difficulties with her memory as well as her executive functioning skills. Pt would benefit from continued skilled ST. Prognosis for improvement remains Good.     Functional Communication Measure (FCM):   Severity Modifier for Medicare G-Code:  Memory  UPDATED 1/23/18  Current status: FCM:  Level 6   - CI  at least 1% < 20% impaired, limited or restricted  Projected status:  FCM: Level 7   - CH 0% impaired, limited or restricted     Other SLP Functional Limitation (Problem Solving) UPDATED 1/23/18  Current status: FCM: Level 6   - CI at least 1% < 20% impaired, limited or restricted  Projected status:  FCM:  Level 7  - CH 0% impaired, limited or restricted     Patient Education/Response:   Pt was educated on strategies and external aids that she can use to support her memory. She verbalized understanding of all discussed.     Home Program: Use memory strategies and external aids at home.    Plans and Goals:   Continue POC with focus on her cognitive-communicative deficits. Focus on executive function and memory for home environment.      Other Recommendations: None at this time      Susie Dempsey, JOSE    Clinician     I certify that I was present in the room directing the student in service  delivery and guiding them using my skilled judgment. As the co-signing therapist I have reviewed the students documentation and am responsible for the treatment, assessment, and plan.    LENORA Gonzalez., CCC-SLP  Speech-Language Pathologist  2/20/2018

## 2018-02-20 NOTE — PROGRESS NOTES
OUTPATIENT NEUROLOGICAL REHABILITATION  SPEECH THERAPY PROGRESS NOTE    Date:  2/20/2018     Start Time:  10:30  Stop Time:  11:15    Subjective/History  Onset Date:  Approximately 1 to 2 years ago  Primary Diagnosis:  Cognitive Impairment, Hemichorea, Debility  Treatment Diagnosis:    1. Cognitive communication deficit      2. Dysarthria         Referring Provider:  Dr. Baldomero Giang  Orders: ST for evaluation and treat    Current Medical History:  Molly Jose J presents to the [facility] with moderate cognitive-communicative impairment secondary to the diagnosis of cognitive impairment, hemichorea, and debility.  Precautions:  Memory loss    TIMED  Procedure Min.    Cognitive Therapy  45         Total Minutes: 45  Total Timed Units: 3  Total Untimed Units: 0  Charges Billed/# of units: 3    Visit #: 17  Date of Evaluation:  12/19/17  Plan of Care Expiration:    02/16/18  Extended POC:    G-CODE   17/10     Progress/Current Status  Subjective:   Pain: 0/10  Pt was pleasant and motivated for therapy. C/o pain and swelling in her left hand. She has an appointment scheduled with her doctor this week to further investigate the cause.   Objective:     Short Term Goals: (6 weeks) Current Progress:   1a. Dr. Lux will complete short term memory tasks (immediate) with 90% acc given IND'ly  -Pt answered open-ended comprehension questions immediately after hearing a short passage read to her by the clinician with 100% acc. The passage was repeated 1x per pt request.    Overall: 96%   GOAL MET    2a. Dr. Lux will complete short term memory tasks (delayed) with 90% acc given min cues.   - After a 10 min delay, pt answered open-ended comprehension questions about a short passage read to her by the clinician with 100% acc.       MET X2    Overall: 93%  GOAL MET    3a. Dr. Lux will complete mental flexibility/manipulation tasks with 90% acc IND'ly. -Pt unscrambled groups of words  to create correct sentences. 4 words: 100%, 5 words:   Overall: 88%  GOAL INCONSISTENTLY MET    4a.  Dr. Lux will complex problem solving, reasoning and thought organization tasks with with 90% acc IND'ly - Complex reasoning/direction following task: 100% acc ind'ly MET X1         Overall: 96%  GOAL MET    5a. Revised 2/6/18 - Duplicate goal of STG 5 previously: Dr. Lux will list 15 to 20 items in an abstract categories.  Abstract:  Things that are big -9   Things that are small- 9   Things that are slow - 3   Average: 7   Overall: 9 items   GOAL NOT MET      Goals Met:  1. Dr. Lux will complete short term memory tasks (immediate) with 90% acc given min cues over 3 consecutive sessions. Goal met/revise 1/23/18  2. Dr. Lux will complete short term memory tasks (delayed) with 90% acc given min cues over 3 consecutive sessions. Goal revised 1/23/18  3. Dr. Lux will complete mental flexibility/manipulation tasks with 90% acc given min cues over 3 consecutive sessions. Goal met/revise 1/23/18  4. Dr. Lux will problem solving, reasoning and thought organization tasks with with 90% acc given min cues over 3 consecutive sessions. Goal met/revise 1/23/18  5. Dr. Lux will list 15 to 20 items in concrete categories within one minute over 3 consecutive sessions. Goal Not Met / Revise  6. Dr. Lux will utilize motor speech strategies (loud, over articulated speech, good breath support) in speech tasks with 90% accuracy given min cues over 3 consecutive sessions.Goal Met/ Discontinue  Assessment:   Pt has met many of her goals and has made significant progress. However, pt reports that she still has difficulties with her memory as well as her executive functioning skills.     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 She presents with impaired short term memory, word finding, problem solving, thought organization, executive function, and mental flexibility skills. Pt would benefit  from continued skilled ST. Prognosis for improvement remains Good     Functional Communication Measure (FCM):   Severity Modifier for Medicare G-Code:  Memory  UPDATED 1/23/18  Current status: FCM:  Level 5   - CJ  at least 20% < 40% impaired, limited or restricted  Projected status:  FCM: Level 6   - CI at least 1% < 20% impaired, limited or restricted     Other SLP Functional Limitation (Problem Solving) UPDATED 1/23/18  Current status: FCM: Level 5   - CJ at least 20% < 40% impaired, limited or restricted  Projected status:  FCM:  Level 6   - CI at least 1% < 20% impaired, limited or restricted     Patient Education/Response:   Pt was educated on strategies and external aids that she can use to support her memory. She verbalized understanding of all discussed.     Home Program: Use memory strategies and external aids at home.    Plans and Goals:   Continue POC with focus on her cognitive-communicative deficits. Focus on executive function and memory for home environment.      Other Recommendations: None at this time      JOSE Curiel    Clinician

## 2018-02-20 NOTE — PROGRESS NOTES
"                                                    Physical Therapy Progress Note     Name: Selma Alonzo Lux MD  Clinic Number: [id number]  Diagnosis:   Encounter Diagnosis   Name Primary?    Impaired functional mobility, balance, gait, and endurance      Physician: Dee Thomson, *  Treatment Orders: PT Eval and Treat  Past Medical History:   Diagnosis Date    Anemia     Arthritis     Hashimoto's disease     Hypertension     IGT (impaired glucose tolerance) 1/12/2016    Joint pain     Multinodular goiter 1/12/2016     Re-Evaluation Date: 12/12/17  Total 2017 Visit #: 6  2018 Visit #: 12  Extended POC Expiration: 01/30/18  Extended POC Expiration: 02/27/18  Precautions: universal, visual impairment, impaired memory      G codes 3/10             Subjective   Pt reports: that she's feeling good this morning but that the L side of her neck has been hurting since she woke up this morning.   Pain Scale:  8/10 L sided neck pain, 3/10 post-intervention    Objective     Patient received individual therapy with activities as follows:     Selma received the following manual therapy techniques were applied for 20 minutes.      Supine:              PROM lower cervical rotation L<>R x 10x each direction with stretch held at end range; lower cervical flexion x 10              PROM upper cervical extension <> flexion; increased stiffness noted; increased muscle guarding noted              3 x 30" B UT stretch   Grades II-III L UPAs at C3, C4, C5, C6   Upglides at C3, C4, C5, C6 with lower cervical rotation to R     L shoulder AB to 90 deg   L shoulder flexion to 90 deg                Therapeutic exercises to increase strength and endurance x 25  min including;    X 10 min on Nu Step recumbent stepper .  Level 5.0 for improved CV endurance.     X 10 supine deep neck flexor, 3" hold, TCs to perform correctly      Lower Extremity Strength  Right LE  10/10/17 12/12/17 01/30/18 2/20/18 Left LE  10/10/17 12/12/17 " 01/30/18 2/20/18   Hip Flexion: 3/5 4-/5 4/5 4/5 Hip Flexion: 4/5 4-/5 4/5 4+/5   Hip Abduction: 4/5 4+/5 4+/5 4+/5 Hip Abduction: 4/5 4-/5 4/5 4+/5   Hip Adduction: 5/5 4+/5 5/5 5/5 Hip Adduction 4+/5 4+/5 4+/5 5/5   Knee Extension: 4+/5 4+/5 5/5 5/5 Knee Extension: 4+/5 4/5 4+/5 5/5   Knee Flexion: 5/5 5/5 4+/5 4+/5 Knee Flexion: 4+/5 3+/5 4/5 4+/5   Ankle Dorsiflexion: 5/5 5/5 5/5 5/5 Ankle Dorsiflexion: 5/5 5/5 5/5 5/5       Evaluation 12/12/17 01/30/18 2/20/18   Timed Up and Go 11 sec c/ SPC 18 sec c/ SPC and CGA  24 sec c/ RW and CGA 14 sec c/ SPC,  SBA NT   Self Selected Walking Speed 0.71 m/sec (6m/8.5s)  C/ SPC 0.67 m/sec (6m/9s) c/ RW  0.67 m/sec (6m/9s) c/ SPC  CGA for both trials 0.75 m/sec (6m/8s) c/ SPC 0.86 m/sec (6m/7s) c/ SPC            Written Home Exercises: Continue HEP   Supine Quad sets, SLR, bridges, hip abd/add, Heel slides  Pt demo good understanding of the education provided. Selma demonstrated good return demonstration of activities.     Education provided re: POC, HEP  No spiritual or educational barriers to learning provided    Pt has no cultural, educational or language barriers to learning provided.    Assessment   Selma tolerated therapy session well this morning but continues to c/o significant L sided neck pain which has progressively gotten worse over past month. Pt reports improved stiffness and pain following manual stretching and mobilizations, but L sided neck pain still persistent with symptoms radiating into L shoulder.  Pt reassessed to determine progress with therapy. Pt has continued to make good progress with neuro PT. Pt demonstrates improved BLE strength, with right hip flexion remaining pt's biggest strength deficit at this time. Pt continues to demonstrate forward flexed posture during gait, but step length and BLE clearance during ambulation appeared improved. Additionally, SSWS improved compared to last reassessment. Pt to transfer to ortho pt for increased focus  on L sided neck pain as this continues to be pt's primary complaint at this time.     Pt rehab potential is Good. Pt will benefit from continuing skilled outpatient physical therapy to address the deficits listed below in the problem list, provide pt/family education and to maximize pt's level of independence in the home and community environment.      History  Co-morbidities and personal factors that may impact the plan of care Examination  Body Structures and Functions, activity limitations and participation restrictions that may impact the plan of care. Medical necessity is demonstrated by the following impairments. Clinical Presentation Decision Making/ Complexity Score   1. OA of multiple joints  2. LBP  3. Visual deficits  4. Cognitive deficits  5. B rotator cuff repair  6. Vascular dementia 1. Decreased balance  2. Gait deficits  3. Increased fall risk  4. Decreased LE strength  5. Impaired endurance  6. Postural deficits evolving-unpredictable symptom presentation, cognitive deficits     high high moderate moderate         Pt's spiritual, cultural and educational needs considered and pt agreeable to plan of care and goals as stated below:      GOALS:   Short term goals: 4 weeks, pt agrees to goals set.  7. Pt to report performing HEP daily to increased IND. MET  8. Pt to demonstrate nadiya hip flexion MMT score to 4/5 to improve functional mobility. MET  9. Pt to demonstrate L hip abduction MMT score to 4/5 to improve functional mobility. MET  10. Pt to improve L knee extension MMT score to 4/5 to improve functional mobility MET  11. Pt to improve L knee flexion MMT score to 4/5 to improve functional mobility MET  12. Pt to improve chair rise score to at least 8 with no hands for improved endurance with functional mobility MET  13. Pt to improve MCTSIB condition 4 score to at least 15 seconds for improved balance and decreased fall risk. MET  14. Pt to improve TUG score with SPC to no more than 16 seconds for  improved safety with ambulation MET  15. Pt to improve SSWS to at least 0.75 m/sec for improved safety with community ambulation. MET     Long term goals: 8 weeks, pt agrees to goals set  11. Pt to demonstrate more upright standing/seated head/shoulder posture to improve balance. MET  12. Pt to improve SSWS to at least 0.86m/sec for improved safety with community ambulation. MET  13. Pt to demonstrate nadiya hip flexion strength of 4+/5 to improve functional mobility.   14. Pt to demonstrate L hip abduction strength to 4+/5 to improve functional mobility. MET  16. Pt to improve L knee extension MMT score to 4/+5 to improve functional mobility MET  17. Pt to improve L knee flexion MMT score to 4+/5 to improve functional mobility MET  18. Pt to improve chair rise score to at least 9 with no hands for improved endurance with functional mobility MET  19. Pt to improve MCTSIB condition 4 score to at least 20 seconds for improved balance and decreased fall risk. MET  20. Pt to improve TUG score with SPC to no more than 14 seconds for improved safety with ambulation MET  21. Pt to improve FOTO score to at least 60% for improved self concept with functional mobility.     Plan   Outpatient physical therapy 2 times weekly to include: Pt Education, HEP, therapeutic exercises, neuromuscular re-education, therapeutic activities, manual therapy, joint mobilizations, aquatic therapy and modalities PRN to achieve established goals. Pt may be seen by PTA as part of the rehabilitation team.      Cont PT for  8 weeks.     Kayleen Bañuelos, PT   02/20/2018

## 2018-02-20 NOTE — PROGRESS NOTES
LCSW attempted to contact patient's daughter, Rosy Rosado, but unable to reach. Telephonic follow-up with patient.  Patient states that she will be going to stay with her sister for a short time until her daughter, Rosy, returns to provide caregiving.  Patient said her long-time private pay caregiving will be leaving.  She said she does still have a private pay caregiving who will remain to provide assistance with bathing and some other needs, but only in the morning.  Patient said she had questions for Dr. Chu regarding [facility].  [facility] Chu [facility] call-back.  Patient states that she has been compliant with medical/mental health appointments and medication.  She states that she is coping adequately at this time.  Patient said she has transportation set up through her sister, or [employer], or [employer], but would like an [employer] application as a back-up plan.  She requested that the application be mailed to her home.  RTA application mailed.  Discharge discussed.  Patient has asked to be placed on monitoring status until the end of the month to ensure that no other social support needs are required.  LCSW will follow-up with patient at the end of the month.  OPCM RN CC'd on note.    Plan:  Monitor patient's social support needs  Ensure patient has received the RTA application

## 2018-02-21 ENCOUNTER — OFFICE VISIT (OUTPATIENT)
Dept: PAIN MEDICINE | Facility: CLINIC | Age: 83
End: 2018-02-21
Attending: ANESTHESIOLOGY
Payer: MEDICARE

## 2018-02-21 ENCOUNTER — TELEPHONE (OUTPATIENT)
Dept: PAIN MEDICINE | Facility: CLINIC | Age: 83
End: 2018-02-21

## 2018-02-21 ENCOUNTER — TELEPHONE (OUTPATIENT)
Dept: PSYCHIATRY | Facility: CLINIC | Age: 83
End: 2018-02-21

## 2018-02-21 VITALS
HEART RATE: 87 BPM | SYSTOLIC BLOOD PRESSURE: 131 MMHG | BODY MASS INDEX: 26.1 KG/M2 | RESPIRATION RATE: 16 BRPM | WEIGHT: 156.69 LBS | TEMPERATURE: 98 F | HEIGHT: 65 IN | DIASTOLIC BLOOD PRESSURE: 77 MMHG

## 2018-02-21 DIAGNOSIS — G24.3 ISOLATED CERVICAL DYSTONIA: Primary | ICD-10-CM

## 2018-02-21 DIAGNOSIS — M62.838 MUSCLE SPASM: ICD-10-CM

## 2018-02-21 LAB
HLA-B27 RELATED AG QL: NEGATIVE
HLA-B27 RELATED AG QL: NORMAL

## 2018-02-21 PROCEDURE — 64616 CHEMODENERV MUSC NECK DYSTON: CPT | Mod: PBBFAC | Performed by: ANESTHESIOLOGY

## 2018-02-21 PROCEDURE — 95874 GUIDE NERV DESTR NEEDLE EMG: CPT | Mod: 26,S$PBB,, | Performed by: ANESTHESIOLOGY

## 2018-02-21 PROCEDURE — 95874 GUIDE NERV DESTR NEEDLE EMG: CPT | Mod: PBBFAC | Performed by: ANESTHESIOLOGY

## 2018-02-21 PROCEDURE — 64616 CHEMODENERV MUSC NECK DYSTON: CPT | Mod: S$PBB,LT,, | Performed by: ANESTHESIOLOGY

## 2018-02-21 PROCEDURE — 99499 UNLISTED E&M SERVICE: CPT | Mod: S$PBB,,, | Performed by: ANESTHESIOLOGY

## 2018-02-21 PROCEDURE — 99999 PR PBB SHADOW E&M-EST. PATIENT-LVL III: CPT | Mod: PBBFAC,,, | Performed by: ANESTHESIOLOGY

## 2018-02-21 PROCEDURE — 99213 OFFICE O/P EST LOW 20 MIN: CPT | Mod: PBBFAC | Performed by: ANESTHESIOLOGY

## 2018-02-21 RX ADMIN — ONABOTULINUMTOXINA 100 UNITS: 100 INJECTION, POWDER, LYOPHILIZED, FOR SOLUTION INTRADERMAL; INTRAMUSCULAR at 02:02

## 2018-02-21 NOTE — PROGRESS NOTES
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    Chief Complaint: Neck Pain    Referred by: Shivam Gonzalez MD     Pain Scales  Best: 4/10  Worst: 10/10  Usually: 9/10  Today: 5/10    Neck Pain    This is a chronic problem. The current episode started more than 1 month ago. The problem occurs constantly. The pain is at a severity of 5/10. The pain is moderate. Pertinent negatives include no chest pain, fever, headaches or weight loss. She has tried muscle relaxants, heat, ice and injection treatment for the symptoms. The treatment provided mild relief. Physical therapy was not tried (currently enrolled).      She is here for Botox injection.  She diagnosed with cervical dystonia.  No new symptomatology.  Trigger point injections help very temporarily.    Past Medical History:   Diagnosis Date    Anemia     Arthritis     Hashimoto's disease     Hypertension     IGT (impaired glucose tolerance) 1/12/2016    Joint pain     Multinodular goiter 1/12/2016       Past Surgical History:   Procedure Laterality Date    CATARACT EXTRACTION      COLONOSCOPY N/A 9/29/2015    Procedure: COLONOSCOPY;  Surgeon: FIDELINA Sanchez MD;  Location: [address];  Service: Endoscopy;  Laterality: N/A;    JOINT REPLACEMENT      right knee    KNEE SURGERY Left 12/31/2013    TKR    left parotidectomy      mixed tumor    SALIVARY GLAND SURGERY      TONSILLECTOMY         Review of patient's allergies indicates:  No Known Allergies    Current Outpatient Prescriptions   Medication Sig Dispense Refill    amlodipine (NORVASC) 5 MG tablet TAKE 1 TABLET DAILY 90 tablet 2    baclofen (LIORESAL) 10 MG tablet Take 1 tablet (10 mg total) by mouth 3 (three) times daily. 270 tablet 2    clopidogrel (PLAVIX) 75 mg tablet Take 1 tablet (75 mg total) by mouth once daily. 90 tablet 1    deutetrabenazine (AUSTEDO) 9 mg Tab Take 2 tablets by mouth 2 (two) times daily. Final titrating dose - needs initially  titration pack from company 120 tablet 11    diazePAM (VALIUM) 2 MG tablet Take 1 tablet (2 mg total) by mouth every evening. 30 tablet 3    ferrous sulfate 325 (65 FE) MG EC tablet Take 325 mg by mouth once daily.       gabapentin (NEURONTIN) 300 MG capsule Take 1-2 capsules (300-600 mg total) by mouth 3 (three) times daily. 540 capsule 1    hydrocodone-acetaminophen 5-325mg (NORCO) 5-325 mg per tablet Take 1 tablet by mouth every 24 hours as needed for Pain. 30 tablet 0    hydroxychloroquine (PLAQUENIL) 200 mg tablet TAKE 2 TABLETS ONCE DAILY 180 tablet 1    linaclotide (LINZESS) 145 mcg Cap capsule Take 1 capsule (145 mcg total) by mouth once daily. 30 capsule 0    memantine (NAMENDA XR) 7-14-21-28 mg C24k Follow titration instructions 7 mg x1. 14 mg x1 week. 21 mg x1 week. 28 mg x1 week. 1 each 0    memantine 28 mg CSpX Take 1 capsule (28 mg total) by mouth once daily. START after titration pack completed. 30 capsule 3    montelukast (SINGULAIR) 10 mg tablet TAKE 1 TABLET EVERY EVENING 90 tablet 2    omega 3-dha-epa-fish oil 250-350-1,000 mg Cap Take 1 capsule by mouth 2 (two) times daily. 180 capsule 3    predniSONE (DELTASONE) 5 MG tablet TAKE 2 TABLETS ONCE DAILY 180 tablet 0    sertraline (ZOLOFT) 25 MG tablet Take 1 tablet (25 mg total) by mouth once daily. 90 tablet 1    thiamine 100 MG tablet Take 1 tablet (100 mg total) by mouth once daily.       No current facility-administered medications for this visit.        Family History   Problem Relation Age of Onset    Hypertension Mother     Prostate cancer Father      prostate cancer    Breast cancer Maternal Grandmother     Lupus Neg Hx     Psoriasis Neg Hx     Melanoma Neg Hx     Colon cancer Neg Hx        Social History     Social History    Marital status:      Spouse name: N/A    Number of children: N/A    Years of education: N/A     Occupational History    Not on file.     Social History Main Topics    Smoking status:  "Never Smoker    Smokeless tobacco: Never Used    Alcohol use No      Comment: very seldom     Drug use: No    Sexual activity: No      Comment: ,  age 52,      Other Topics Concern    Not on file     Social History Narrative    No narrative on file           Review of Systems   Constitution: Negative for chills, fever, malaise/fatigue, weight gain and weight loss.   HENT: Negative for ear pain and hoarse voice.    Eyes: Negative for blurred vision, pain and visual disturbance.   Cardiovascular: Negative for chest pain, dyspnea on exertion and irregular heartbeat.   Respiratory: Negative for cough, shortness of breath and wheezing.    Endocrine: Negative for cold intolerance and heat intolerance.   Hematologic/Lymphatic: Negative for adenopathy and bleeding problem. Does not bruise/bleed easily.   Skin: Negative for color change, itching and rash.   Musculoskeletal: Positive for back pain and neck pain.   Gastrointestinal: Negative for change in bowel habit, diarrhea, hematemesis, hematochezia, melena and vomiting.   Genitourinary: Positive for frequency and urgency. Negative for flank pain and hematuria.   Neurological: Positive for difficulty with concentration. Negative for dizziness, headaches, loss of balance and seizures.   Psychiatric/Behavioral: Negative for altered mental status, depression and suicidal ideas. The patient is not nervous/anxious.    Allergic/Immunologic: Negative for HIV exposure.           Objective:   /77   Pulse 87   Temp 98 °F (36.7 °C) (Oral)   Resp 16   Ht 5' 5" (1.651 m)   Wt 71.1 kg (156 lb 11.2 oz)   LMP  (LMP Unknown)   BMI 26.08 kg/m²   Normocephalic.  Atraumatic.  Affect appropriate.  Breathing unlabored.  Extra ocular muscles intact.      Ortho/SPM Exam    positive myofascial pain along the left longissimus and left sternocleidomastoid superiorly.  Head rotation towards the right side.  Positive tremors.  Limited left rotation.   Assessment:     "   Encounter Diagnoses   Name Primary?    Isolated cervical dystonia Yes    Muscle spasm          Plan:       Selma was seen today for neck pain.    Diagnoses and all orders for this visit:    Isolated cervical dystonia  -     onabotulinumtoxina injection 100 Units; Inject 100 Units into the muscle one time.    Muscle spasm  -     onabotulinumtoxina injection 100 Units; Inject 100 Units into the muscle one time.         We discussed with the patient the assessment and recommendations. The following is the plan we agreed on:  1.  Procedure: Under clean technique, EMG guidance and after discussing with patient 100 units of Botox were used.  Reinjected the superior sternocleidomastoid, splenius capitis, and longissimus on the left side.  Patient tolerated procedure well.  2.  Return as needed.  Otherwise follow-up in one month to see how this procedure worked.

## 2018-02-21 NOTE — TELEPHONE ENCOUNTER
----- Message from Marquez Reddy sent at 2/21/2018 12:08 PM CST -----  Contact: Pt  X_ 1st Request  _ 2nd Request  _ 3rd Request    Who: SARA HERNANDEZ MD [[id number]]    Why: Patient states she will be running late for her appointment. Explain patient the 15 min leny period. Patient states she is not feeling too well and will be coming to the appointment and do not cancel even if she is late due to issues she had last week.    What Number to Call Back: [phone]    When to Expect a call back: (Before the end of the day)  -- if call after 3:00 call back will be tomorrow.

## 2018-02-21 NOTE — TELEPHONE ENCOUNTER
Contacted patient regarding message, no answer, left voicemail asking for a return call to discuss further.

## 2018-02-21 NOTE — PROGRESS NOTES
Subjective:      Patient ID: Selmajennifer Rosado MD Jose J is a 82 y.o. female.    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  She was last seen by me on 1/4/2018 and she was continuing to have neck pain on the left.  We sent her back to PT for her neck.  She has continued PT for gait and ADL.  Pt is just starting for her neck.  She has seen Dr. Landa for her low back and had a Right L4/5 TFESI with Dr. Valdez.  She has left sided hemichorea.  She was sent to Dr. Gonzalez to consider botox.  She had trigger point in SCM on 1/17/2018 and botox was done on 2/21/2018.  She is having an MRI tomorrow for her hand.  She is having pain in her left arm pit.  She feels like the pain is deep inside and in her shoulder.  She feels like she cannot go over her head.  She feels like she cannot get her arm over the head.  She has been moving better at home.  She is on a cane and not a wheelchair.  She is not sure botox helping she feels like missed spots.  Currently she is having hand pain.  She is having left shoulder and hand pain.  She has been having the shoulder pain for 2 months.  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  She also has rheumatoid arthritis.  She is unable to describe the pain.  She says it just hurts.  She is taking the gabapentin 1 pill three times a day.  She is also taking the baclofen 3 times    MRI cervical 3/2017  There is mild anterolisthesis C2 on C3 and mild retrolisthesis of C3 on C4.  There is also mild retrolisthesis of C6 on C7.  The spondylolisthesis is likely secondary to ligamentous laxity.  There is straightening of normal cervical lordosis. Vertebral body heights are well maintained without evidence of fracture or marrow signal abnormality suspicious for an infiltrative process.  There is loss of intervertebral disc space height at C5-6 and C6-7 Visualized posterior fossa, cervical cord, and  upper thoracic cord demonstrate normal signal. Surrounding soft tissue structures demonstrate no significant abnormalities.      C2-3:  Posterior disc osteophyte complex and bilateral facet arthropathy.  Effacement of the anterior thecal sac, without flattening of the spinal cord.  No significant neural foraminal narrowing.     C3-4:  Posterior disc osteophyte complex and bilateral facet arthropathy, resulting in effacement of the anterior thecal sac, without flattening of the spinal cord.  There is moderate left-sided neuroforaminal narrowing.       C4-5:  Posterior disc osteophyte complex and bilateral facet arthropathy, resulting in effacement of the anterior thecal sac, without flattening of the spinal cord.  There is no significant neural foraminal narrowing.     C5-6:  Posterior disc osteophyte complex and bilateral facet arthropathy, resulting in effacement of the anterior thecal sac, without flattening of spinal cord.  No significant neural foraminal narrowing.     C6-7:  Posterior disc osteophyte complex and bilateral facet arthropathy, resulting in effacement of the anterior thecal sac, without flattening of the spinal cord.  No significant neural foraminal narrowing.     C7-T1:  No significant spinal canal stenosis.  No significant neural foraminal narrowing.  Impression         There is multilevel degenerative changes of the cervical spine, resulting in mild spinal canal stenosis and neuroforaminal narrowing as detailed above.      X-ray cervical  AP, neutral lateral, flexion lateral, and extension lateral radiographs of the cervical spine were obtained.  Note that the cervicothoracic junction is not optimally visualized.  There is 2 mm anterolisthesis of C2 on C3 which increases to 3 mm on the flexion radiographs.  The remainder of the posterior vertebral alignment is satisfactory.  Vertebral body heights are well-maintained.  There are advanced degenerative changes identified throughout the cervical  spine with disc space narrowing and anterior and posterior osteophyte formation.  There is solid bony fusion of the C5 and C6 vertebral bodies.  There is mild facet arthropathy noted throughout the cervical spine.  Atherosclerotic calcification is identified in the region of the carotid arteries bilaterally.  Impression         2 mm anterolisthesis of C2 on C3 which appears to be degenerative in etiology.  This increases to 3 mm on the flexion radiograph of the cervical spine.  Cervical spondylosis.  Atherosclerosis.    X-ray left shoulder 2015  Left shoulder: Significant acromiohumeral interval narrowing, this can be associated with a rotator cuff tear.  The humeral head demonstrate normal contour.  No osseous lesions, sclerosis or significant osteophyte formation.  The acromioclavicular joint   appears within normal limits.  The soft tissues appear normal.    Past Medical History:  No date: Anemia  No date: Arthritis  No date: Hashimoto's disease  No date: Hypertension  1/12/2016: IGT (impaired glucose tolerance)  No date: Joint pain  1/12/2016: Multinodular goiter    Past Surgical History:  No date: CATARACT EXTRACTION  9/29/2015: COLONOSCOPY N/A      Comment: Procedure: COLONOSCOPY;  Surgeon: FIDELINA Sanchez MD;  Location: [facility] ([address] [place] [address]);                 Service: Endoscopy;  Laterality: N/A;  No date: JOINT REPLACEMENT      Comment: right knee  12/31/2013: KNEE SURGERY Left      Comment: TKR  No date: left parotidectomy      Comment: mixed tumor  No date: SALIVARY GLAND SURGERY  No date: TONSILLECTOMY    Review of patient's family history indicates:    Hypertension                   Mother                    Prostate cancer                Father                      Comment: prostate cancer    Breast cancer                  Maternal Grandmother      Lupus                          Neg Hx                    Psoriasis                      Neg Hx                    Melanoma                        Neg Hx                    Colon cancer                   Neg Hx                      Social History    Marital status:              Spouse name:                       Years of education:                 Number of children:               Social History Main Topics    Smoking status: Never Smoker                                                                Smokeless tobacco: Never Used                        Alcohol use: No                 Comment: very seldom     Drug use: No              Sexual activity: No                      Comment: ,  age 52,         Current Outpatient Prescriptions:  amlodipine (NORVASC) 5 MG tablet, TAKE 1 TABLET DAILY, Disp: 90 tablet, Rfl: 2  baclofen (LIORESAL) 10 MG tablet, Take 1 tablet (10 mg total) by mouth 3 (three) times daily., Disp: 270 tablet, Rfl: 2  clopidogrel (PLAVIX) 75 mg tablet, Take 1 tablet (75 mg total) by mouth once daily., Disp: 90 tablet, Rfl: 1  deutetrabenazine (AUSTEDO) 9 mg Tab, Take 2 tablets by mouth 2 (two) times daily. Final titrating dose - needs initially titration pack from company, Disp: 120 tablet, Rfl: 11  diazePAM (VALIUM) 2 MG tablet, Take 1 tablet (2 mg total) by mouth every evening., Disp: 30 tablet, Rfl: 3  ferrous sulfate 325 (65 FE) MG EC tablet, Take 325 mg by mouth once daily. , Disp: , Rfl:   gabapentin (NEURONTIN) 300 MG capsule, Take 1-2 capsules (300-600 mg total) by mouth 3 (three) times daily., Disp: 540 capsule, Rfl: 1  hydrocodone-acetaminophen 5-325mg (NORCO) 5-325 mg per tablet, Take 1 tablet by mouth every 24 hours as needed for Pain., Disp: 30 tablet, Rfl: 0  hydroxychloroquine (PLAQUENIL) 200 mg tablet, TAKE 2 TABLETS ONCE DAILY, Disp: 180 tablet, Rfl: 1  linaclotide (LINZESS) 145 mcg Cap capsule, Take 1 capsule (145 mcg total) by mouth once daily., Disp: 30 capsule, Rfl: 0  memantine (NAMENDA XR) 7-14-21-28 mg C24k, Follow titration instructions 7 mg x1. 14 mg x1 week. 21 mg x1 week. 28 mg x1 week., Disp: 1 each,  Rfl: 0  memantine 28 mg CSpX, Take 1 capsule (28 mg total) by mouth once daily. START after titration pack completed., Disp: 30 capsule, Rfl: 3  montelukast (SINGULAIR) 10 mg tablet, TAKE 1 TABLET EVERY EVENING, Disp: 90 tablet, Rfl: 2  omega 3-dha-epa-fish oil 250-350-1,000 mg Cap, Take 1 capsule by mouth 2 (two) times daily., Disp: 180 capsule, Rfl: 3  predniSONE (DELTASONE) 5 MG tablet, TAKE 2 TABLETS ONCE DAILY, Disp: 180 tablet, Rfl: 0  sertraline (ZOLOFT) 25 MG tablet, Take 1 tablet (25 mg total) by mouth once daily., Disp: 90 tablet, Rfl: 1  thiamine 100 MG tablet, Take 1 tablet (100 mg total) by mouth once daily., Disp: , Rfl:     No current facility-administered medications for this visit.       Review of patient's allergies indicates:  No Known Allergies              Review of Systems   Constitution: Negative for weight gain and weight loss.   Cardiovascular: Negative for chest pain.   Respiratory: Negative for shortness of breath.    Musculoskeletal: Positive for joint pain and neck pain. Negative for joint swelling.   Gastrointestinal: Negative for abdominal pain and bowel incontinence.   Genitourinary: Negative for bladder incontinence.   Neurological: Negative for numbness.         Objective:        General: Selma is well-developed, well-nourished, appears stated age, in no acute distress, alert and oriented to time, place and person.     General    Vitals reviewed.  Constitutional: She is oriented to person, place, and time. She appears well-developed and well-nourished.   HENT:   Head: Normocephalic and atraumatic.   Pulmonary/Chest: Effort normal.   Neurological: She is alert and oriented to person, place, and time.   Psychiatric: She has a normal mood and affect. Her behavior is normal. Judgment and thought content normal.     General Musculoskeletal Exam   Gait: abnormal (straight cane)     Back (L-Spine & T-Spine) / Neck (C-Spine) Exam     Tenderness   The patient is tender to palpation of the  left SCM. Posterior midline palpation reveals tenderness of the Upper C-Spine and Lower C-Spine. Right paramedian tenderness of the Upper C-Spine and Lower C-Spine. Left paramedian tenderness of the Upper C-Spine and Lower C-Spine.     Neck (C-Spine) Range of Motion   Flexion:     30  Extension: 30Right Lateral Bend: 10 Left Lateral Bend: 10 Right Rotation: 30 Left Rotation: 30     Spinal Sensation   Right Side Sensation  C-Spine Level: normal   L-Spine Level: normal  S-Spine Level: normal  Left Side Sensation  C-Spine Level: normal  L-Spine Level: normal  S-Spine Level: normal    Other She has no scoliosis .  Spinal Kyphosis:  Absent  Left Hand/Wrist Exam     Inspection   Effusion: Hand -  present          Left Shoulder Exam     Tenderness   The patient is tender to palpation of the biceps tendon.    Range of Motion   Active Abduction: 90   Passive Abduction: 150     Tests & Signs   Rotator Cuff Painful Arc/Range: moderate    Comments:  Tender over anterior pec      Muscle Strength   Right Upper Extremity   Biceps: 4/5/5   Deltoid:  4/5  Triceps:  4/5  Wrist Extension: 4/5/5   : 4/5/5   Finger Flexors:  4/5  Left Upper Extremity  Biceps: 4/5/5   Deltoid:  4/5  Triceps:  4/5  Wrist Extension: 4/5/5   :  4/5/5   Finger Flexors:  4/5  Right Lower Extremity   Hip Flexion: 5/5   Left Lower Extremity   Hip Flexion: 5/5     Reflexes     Left Side  Biceps:  2+  Triceps:  2+  Brachioradialis:  2+  Quadriceps:  2+  Achilles:  2+  Left Davis's Sign:  Absent  Babinski Sign:  absent    Right Side   Biceps:  2+  Triceps:  2+  Brachioradialis:  2+  Quadriceps:  2+  Achilles:  2+  Right Davis's Sign:  absent  Babinski Sign:  absent    Vascular Exam     Right Pulses        Carotid:                  2+    Left Pulses        Carotid:                  2+              Assessment:       1. Neck pain    2. Muscle spasm    3. Hemichorea    4. Spondylosis of cervical region without myelopathy or radiculopathy    5. Isolated  cervical dystonia    6. Chronic left shoulder pain           Plan:       Orders Placed This Encounter    Ambulatory consult to Orthopedics     1.  Pain management appointment with Dr. Gonzalez, she had botox yesterday, we discussed it can take time for it to work  2.  PT for neck ROM, strengthening and retractions [facility]  [facility] are just starting to work on her neck    3.  Baclofen 10mg po TID  4.   increase gabapentin to 600 mg po TID, they will start with 600 at night and then add  5.  Today she is complaining of shoulder and hand pain.  She has an MRI tomorrow, she mentions erosive arthritis.  She is also complaining of more shoulder pain.  Unable to describe pain.  She does have some chronic shoulder issues.  Shoulder pain could be related to that.  It could perhaps be shoulder hand from CVA.  We will send her to ortho to eval shoulder.  We will increase gabapentin to help shoulder hand  6.  She is walking with cane  7.  RTC 8 weeks      Follow-up: Follow-up in about 8 weeks (around 4/19/2018). If there are any questions prior to this, the patient was instructed to contact the office.

## 2018-02-21 NOTE — TELEPHONE ENCOUNTER
Called patient back regarding question about Namenda.  She did not remember what the question was and did not remember seeing me in Psychiatry several weeks back.  She says she has stopped taking Zoloft because she accidentally didn't take it for a week and did not feel any different.  She does not believe she is taking Namenda and was wondering what the plan for this was.  Last Neurology note states she was waiting for it to be delivered but she does not know if she has it.  Will forward conversation to Case Management and Neurology (Dr. Rubio) for clarification.

## 2018-02-22 ENCOUNTER — OFFICE VISIT (OUTPATIENT)
Dept: SPINE | Facility: CLINIC | Age: 83
End: 2018-02-22
Attending: PHYSICAL MEDICINE & REHABILITATION
Payer: MEDICARE

## 2018-02-22 VITALS
SYSTOLIC BLOOD PRESSURE: 140 MMHG | HEIGHT: 65 IN | BODY MASS INDEX: 26.16 KG/M2 | HEART RATE: 85 BPM | WEIGHT: 157 LBS | DIASTOLIC BLOOD PRESSURE: 61 MMHG

## 2018-02-22 DIAGNOSIS — G24.3 ISOLATED CERVICAL DYSTONIA: ICD-10-CM

## 2018-02-22 DIAGNOSIS — M25.512 CHRONIC LEFT SHOULDER PAIN: ICD-10-CM

## 2018-02-22 DIAGNOSIS — M54.2 NECK PAIN: Primary | ICD-10-CM

## 2018-02-22 DIAGNOSIS — G25.5 HEMICHOREA: ICD-10-CM

## 2018-02-22 DIAGNOSIS — M62.838 MUSCLE SPASM: ICD-10-CM

## 2018-02-22 DIAGNOSIS — G89.29 CHRONIC LEFT SHOULDER PAIN: ICD-10-CM

## 2018-02-22 DIAGNOSIS — M47.812 SPONDYLOSIS OF CERVICAL REGION WITHOUT MYELOPATHY OR RADICULOPATHY: ICD-10-CM

## 2018-02-22 PROCEDURE — 1159F MED LIST DOCD IN RCRD: CPT | Mod: ,,, | Performed by: PHYSICAL MEDICINE & REHABILITATION

## 2018-02-22 PROCEDURE — 99213 OFFICE O/P EST LOW 20 MIN: CPT | Mod: PBBFAC | Performed by: PHYSICAL MEDICINE & REHABILITATION

## 2018-02-22 PROCEDURE — 99999 PR PBB SHADOW E&M-EST. PATIENT-LVL III: CPT | Mod: PBBFAC,,, | Performed by: PHYSICAL MEDICINE & REHABILITATION

## 2018-02-22 PROCEDURE — 1125F AMNT PAIN NOTED PAIN PRSNT: CPT | Mod: ,,, | Performed by: PHYSICAL MEDICINE & REHABILITATION

## 2018-02-22 PROCEDURE — 99214 OFFICE O/P EST MOD 30 MIN: CPT | Mod: S$PBB,,, | Performed by: PHYSICAL MEDICINE & REHABILITATION

## 2018-02-22 RX ORDER — OMEPRAZOLE 20 MG/1
20 CAPSULE, DELAYED RELEASE ORAL DAILY
COMMUNITY
Start: 2018-01-28 | End: 2018-03-16 | Stop reason: SDUPTHER

## 2018-02-22 NOTE — PATIENT INSTRUCTIONS
Increase gabapentin from 3-6 a day, start with 2 at night and then increase to 2 twice a day and then 2 three times a day as tolerated

## 2018-02-23 ENCOUNTER — HOSPITAL ENCOUNTER (OUTPATIENT)
Dept: RADIOLOGY | Facility: HOSPITAL | Age: 83
Discharge: HOME | End: 2018-02-23
Attending: INTERNAL MEDICINE
Payer: MEDICARE

## 2018-02-23 DIAGNOSIS — M25.542 ARTHRALGIA OF HAND, LEFT: ICD-10-CM

## 2018-02-23 PROCEDURE — A9585 GADOBUTROL INJECTION: HCPCS | Performed by: INTERNAL MEDICINE

## 2018-02-23 PROCEDURE — 25500020 PHARM REV CODE 255: Performed by: INTERNAL MEDICINE

## 2018-02-23 PROCEDURE — 73223 MRI JOINT UPR EXTR W/O&W/DYE: CPT | Mod: TC,LT

## 2018-02-23 PROCEDURE — 73220 MRI UPPR EXTREMITY W/O&W/DYE: CPT | Mod: 26,LT,GC, | Performed by: RADIOLOGY

## 2018-02-23 RX ORDER — GADOBUTROL 604.72 MG/ML
10 INJECTION INTRAVENOUS
Status: COMPLETED | OUTPATIENT
Start: 2018-02-23 | End: 2018-02-23

## 2018-02-23 RX ADMIN — GADOBUTROL 10 ML: 604.72 INJECTION INTRAVENOUS at 01:02

## 2018-02-27 ENCOUNTER — LAB VISIT (OUTPATIENT)
Dept: LAB | Facility: HOSPITAL | Age: 83
End: 2018-02-27
Attending: INTERNAL MEDICINE
Payer: MEDICARE

## 2018-02-27 ENCOUNTER — OFFICE VISIT (OUTPATIENT)
Dept: RHEUMATOLOGY | Facility: CLINIC | Age: 83
End: 2018-02-27
Payer: MEDICARE

## 2018-02-27 ENCOUNTER — OFFICE VISIT (OUTPATIENT)
Dept: CARDIOLOGY | Facility: CLINIC | Age: 83
End: 2018-02-27
Payer: MEDICARE

## 2018-02-27 VITALS
HEIGHT: 65 IN | DIASTOLIC BLOOD PRESSURE: 60 MMHG | SYSTOLIC BLOOD PRESSURE: 133 MMHG | BODY MASS INDEX: 26 KG/M2 | WEIGHT: 156.06 LBS | HEART RATE: 81 BPM

## 2018-02-27 VITALS
HEIGHT: 65 IN | BODY MASS INDEX: 25.93 KG/M2 | DIASTOLIC BLOOD PRESSURE: 64 MMHG | HEART RATE: 64 BPM | WEIGHT: 155.63 LBS | SYSTOLIC BLOOD PRESSURE: 110 MMHG

## 2018-02-27 DIAGNOSIS — I10 HYPERTENSION, ESSENTIAL: ICD-10-CM

## 2018-02-27 DIAGNOSIS — M05.79 RHEUMATOID ARTHRITIS INVOLVING MULTIPLE SITES WITH POSITIVE RHEUMATOID FACTOR: Primary | ICD-10-CM

## 2018-02-27 DIAGNOSIS — M05.79 SEROPOSITIVE RHEUMATOID ARTHRITIS OF MULTIPLE SITES: ICD-10-CM

## 2018-02-27 DIAGNOSIS — M85.80 OSTEOPENIA, UNSPECIFIED LOCATION: ICD-10-CM

## 2018-02-27 DIAGNOSIS — I67.9 SMALL VESSEL DISEASE, CEREBROVASCULAR: Primary | ICD-10-CM

## 2018-02-27 DIAGNOSIS — M19.042 PRIMARY OSTEOARTHRITIS OF BOTH HANDS: ICD-10-CM

## 2018-02-27 DIAGNOSIS — M19.041 PRIMARY OSTEOARTHRITIS OF BOTH HANDS: ICD-10-CM

## 2018-02-27 DIAGNOSIS — I51.89 DIASTOLIC DYSFUNCTION WITHOUT HEART FAILURE: ICD-10-CM

## 2018-02-27 DIAGNOSIS — M05.79 RHEUMATOID ARTHRITIS INVOLVING MULTIPLE SITES WITH POSITIVE RHEUMATOID FACTOR: ICD-10-CM

## 2018-02-27 DIAGNOSIS — M81.0 AGE-RELATED OSTEOPOROSIS WITHOUT CURRENT PATHOLOGICAL FRACTURE: ICD-10-CM

## 2018-02-27 LAB
ALBUMIN SERPL BCP-MCNC: 3.1 G/DL
ALP SERPL-CCNC: 68 U/L
ALT SERPL W/O P-5'-P-CCNC: 11 U/L
ANION GAP SERPL CALC-SCNC: 7 MMOL/L
AST SERPL-CCNC: 13 U/L
BASOPHILS # BLD AUTO: 0.03 K/UL
BASOPHILS NFR BLD: 0.4 %
BILIRUB SERPL-MCNC: 0.5 MG/DL
BUN SERPL-MCNC: 16 MG/DL
CALCIUM SERPL-MCNC: 9.3 MG/DL
CHLORIDE SERPL-SCNC: 102 MMOL/L
CO2 SERPL-SCNC: 28 MMOL/L
CREAT SERPL-MCNC: 0.9 MG/DL
DIFFERENTIAL METHOD: ABNORMAL
EOSINOPHIL # BLD AUTO: 0.2 K/UL
EOSINOPHIL NFR BLD: 2.1 %
ERYTHROCYTE [DISTWIDTH] IN BLOOD BY AUTOMATED COUNT: 14.3 %
EST. GFR  (AFRICAN AMERICAN): >60 ML/MIN/1.73 M^2
EST. GFR  (NON AFRICAN AMERICAN): >60 ML/MIN/1.73 M^2
GLUCOSE SERPL-MCNC: 110 MG/DL
HCT VFR BLD AUTO: 33.8 %
HGB BLD-MCNC: 10.3 G/DL
IMM GRANULOCYTES # BLD AUTO: 0.01 K/UL
IMM GRANULOCYTES NFR BLD AUTO: 0.1 %
LYMPHOCYTES # BLD AUTO: 1 K/UL
LYMPHOCYTES NFR BLD: 13.4 %
MCH RBC QN AUTO: 24.9 PG
MCHC RBC AUTO-ENTMCNC: 30.5 G/DL
MCV RBC AUTO: 82 FL
MONOCYTES # BLD AUTO: 1 K/UL
MONOCYTES NFR BLD: 13 %
NEUTROPHILS # BLD AUTO: 5.2 K/UL
NEUTROPHILS NFR BLD: 71 %
NRBC BLD-RTO: 0 /100 WBC
PLATELET # BLD AUTO: 291 K/UL
PMV BLD AUTO: 9.5 FL
POTASSIUM SERPL-SCNC: 4.6 MMOL/L
PROT SERPL-MCNC: 8.1 G/DL
RBC # BLD AUTO: 4.13 M/UL
SODIUM SERPL-SCNC: 137 MMOL/L
WBC # BLD AUTO: 7.31 K/UL

## 2018-02-27 PROCEDURE — 86803 HEPATITIS C AB TEST: CPT

## 2018-02-27 PROCEDURE — 36415 COLL VENOUS BLD VENIPUNCTURE: CPT

## 2018-02-27 PROCEDURE — 86703 HIV-1/HIV-2 1 RESULT ANTBDY: CPT

## 2018-02-27 PROCEDURE — 86480 TB TEST CELL IMMUN MEASURE: CPT

## 2018-02-27 PROCEDURE — 1159F MED LIST DOCD IN RCRD: CPT | Mod: ,,, | Performed by: INTERNAL MEDICINE

## 2018-02-27 PROCEDURE — 87340 HEPATITIS B SURFACE AG IA: CPT

## 2018-02-27 PROCEDURE — 1125F AMNT PAIN NOTED PAIN PRSNT: CPT | Mod: ,,, | Performed by: INTERNAL MEDICINE

## 2018-02-27 PROCEDURE — 86706 HEP B SURFACE ANTIBODY: CPT

## 2018-02-27 PROCEDURE — 99214 OFFICE O/P EST MOD 30 MIN: CPT | Mod: S$PBB,,, | Performed by: INTERNAL MEDICINE

## 2018-02-27 PROCEDURE — 85025 COMPLETE CBC W/AUTO DIFF WBC: CPT

## 2018-02-27 PROCEDURE — 99213 OFFICE O/P EST LOW 20 MIN: CPT | Mod: PBBFAC | Performed by: INTERNAL MEDICINE

## 2018-02-27 PROCEDURE — 99999 PR PBB SHADOW E&M-EST. PATIENT-LVL V: CPT | Mod: PBBFAC,GC,, | Performed by: STUDENT IN AN ORGANIZED HEALTH CARE EDUCATION/TRAINING PROGRAM

## 2018-02-27 PROCEDURE — 99214 OFFICE O/P EST MOD 30 MIN: CPT | Mod: S$PBB,GC,, | Performed by: INTERNAL MEDICINE

## 2018-02-27 PROCEDURE — 99215 OFFICE O/P EST HI 40 MIN: CPT | Mod: PBBFAC,27 | Performed by: STUDENT IN AN ORGANIZED HEALTH CARE EDUCATION/TRAINING PROGRAM

## 2018-02-27 PROCEDURE — 86704 HEP B CORE ANTIBODY TOTAL: CPT

## 2018-02-27 PROCEDURE — 80053 COMPREHEN METABOLIC PANEL: CPT

## 2018-02-27 PROCEDURE — 99999 PR PBB SHADOW E&M-EST. PATIENT-LVL III: CPT | Mod: PBBFAC,,, | Performed by: INTERNAL MEDICINE

## 2018-02-27 ASSESSMENT — ROUTINE ASSESSMENT OF PATIENT INDEX DATA (RAPID3)
MDHAQ FUNCTION SCORE: .6
PAIN SCORE: 8
PATIENT GLOBAL ASSESSMENT SCORE: 1
FATIGUE SCORE: .5
TOTAL RAPID3 SCORE: 3.67
PSYCHOLOGICAL DISTRESS SCORE: 0

## 2018-02-27 NOTE — PROGRESS NOTES
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   She has osteoarthritis of cervical spine,scoliosis of thoracic spine,LS spine arthritis,hip OA,Feet OA  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    She continues to have ESR 61/CRP 70.5  HLAB27 negative    Last time when she saw us we just resumed her plaquenil and prednisone  Prior to that she saw  6/2017    She takes plaquenil 200 mg bid and prednisone 5 mg daily    She had called us sep 2017 for hand symptoms and she was asked to take prednisone    Her complaints are :    Left hand : thumb cant do seat belt  4th finger hurts  Using it makes it worse  Hurts at rest too  Prednisone helped  Since September symptoms started and hasnt stopped    In the past she had RF between 20 to 40 and negative CCP  She had high inflammatory markers  She had xray changes showing erosive osteoarthritis     She gets shoulder injections and sees pain management for back pain  Both her rotator cuffs are torn  Gets botox for her neck symptoms    Has IBS  Has anemia,PUD,fatty liver  She has hashimotos thyroiditis/goiter,low vit d   CVA    Medications :   norvasc 5 mg,baclofen 10 mg,valium 2 mg,iron,gabapentin 300 mg tid,narcotics,plaquenil,singulair 10 mg,prednisone 5 mg,linaclotide 145 mcg,thiamine,plavix,memantine,fish oils,austedo,zoloft 25 mg    Past history: as stated above    Family history: none    Social history : not a smoker and alcoholic     Review of Systems   Constitutional: Negative for activity change, appetite change, chills, diaphoresis, fatigue, fever and unexpected weight change.   HENT: Negative  for congestion, dental problem, drooling, ear discharge, ear pain, facial swelling, hearing loss, mouth sores, nosebleeds, postnasal drip, rhinorrhea, sinus pain, sinus pressure, sneezing, sore throat, tinnitus, trouble swallowing and voice change.    Eyes: Negative for photophobia, pain, discharge, redness, itching and visual disturbance.   Respiratory: Negative for apnea, cough, choking, chest tightness, shortness of breath, wheezing and stridor.    Cardiovascular: Negative for chest pain, palpitations and leg swelling.   Gastrointestinal: Negative for abdominal distention, abdominal pain, anal bleeding, blood in stool, constipation, diarrhea, nausea, rectal pain and vomiting.   Endocrine: Negative for cold intolerance, heat intolerance, polydipsia, polyphagia and polyuria.   Genitourinary: Negative for decreased urine volume, difficulty urinating, dysuria, enuresis, flank pain, frequency, genital sores, hematuria and urgency.   Musculoskeletal: Positive for arthralgias. Negative for back pain, gait problem, joint swelling, myalgias, neck pain and neck stiffness.   Skin: Negative for color change, pallor, rash and wound.   Allergic/Immunologic: Negative for environmental allergies, food allergies and immunocompromised state.   Neurological: Negative for dizziness, tremors, seizures, syncope, facial asymmetry, speech difficulty, weakness, light-headedness, numbness and headaches.   Hematological: Negative for adenopathy. Does not bruise/bleed easily.   Psychiatric/Behavioral: Negative for agitation, behavioral problems, confusion, decreased concentration, dysphoric mood, hallucinations, self-injury, sleep disturbance and suicidal ideas. The patient is not nervous/anxious and is not hyperactive.      Physical Exam     SHEIKH-28 tender joint count: 0  SHEIKH-28 swollen joint count: 0    Tenderness:   Left hand: 1st MCP, 1st PIP, 2nd PIP, 4th PIP, 2nd DIP and 4th DIP    Swelling:   Left hand: 2nd PIP and 4th PIP    SHEIKH-28  tender joint count: 4  SHEIHK-28 swollen joint count: 2      Physical Exam   Constitutional: She is oriented to person, place, and time and well-developed, well-nourished, and in no distress. No distress.   HENT:   Head: Normocephalic.   Mouth/Throat: Oropharynx is clear and moist.   Eyes: Conjunctivae are normal. Pupils are equal, round, and reactive to light. Right eye exhibits no discharge. Left eye exhibits no discharge. No scleral icterus.   Neck: Normal range of motion. No thyromegaly present.   Cardiovascular: Normal rate, regular rhythm, normal heart sounds and intact distal pulses.    Pulmonary/Chest: Effort normal and breath sounds normal. No stridor.   Abdominal: Soft. Bowel sounds are normal.   Lymphadenopathy:     She has no cervical adenopathy.   Neurological: She is alert and oriented to person, place, and time.   Skin: Skin is warm. No rash noted. She is not diaphoretic.     Psychiatric: Affect and judgment normal.   Musculoskeletal: Normal range of motion.       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    She is here because her left hand 1rst and 4th digits have been bothering a lot and she has not been able to keep up with her activities of daily living like using her seat belt    Based on labs and xrays I suspected erosive osteoarthritis > rheumatoid arthritis : Asymmetric involvement/erosions in the DIPs/PIP and DIP involvement but I did MRI of the left hand and she has tenosynovitis and erosive changes typical of erosive inflammatory arthritis  She continues to have very high inflammatory markers     She has erosive rheumatoid arthritis in combination with degenerative arthritis      1. Rheumatoid arthritis involving multiple sites with positive rheumatoid factor    2. Osteopenia, unspecified location    3. Age-related osteoporosis without current pathological fracture    4. Seropositive rheumatoid arthritis of multiple sites    5. Primary  osteoarthritis of both hands        Reviewed labs/MRI  Reviewed medications    Follow up problem      Plan    Will continue plaquenil and prednisone same doses for now    Will get her to ID  Tb,hepatitis,HIV,RPR    Initiate humira   Discussed side effects like injection site reactions,infections,systemic anaphylaxis,neurologic symptoms and rare potential for malignancies    Hand therapy referral    Offered dexa    Offered hand surgery evaluation,she refuses yet    Selma was seen today for appointment.    Diagnoses and all orders for this visit:    Rheumatoid arthritis involving multiple sites with positive rheumatoid factor  -     CBC auto differential; Future  -     Comprehensive metabolic panel; Future  -     HIV-1 and HIV-2 antibodies; Future  -     Hepatitis B surface antigen; Future  -     HBcAB; Future  -     Hepatitis B surface antibody; Future  -     Hepatitis C antibody; Future  -     Quantiferon Gold TB; Future  -     RPR; Future  -     Ambulatory referral to Infectious Disease  -     Discontinue: adalimumab (HUMIRA PEN) PnKt injection; Inject 0.8 mLs (40 mg total) into the skin every 14 (fourteen) days.  -     adalimumab (HUMIRA PEN) PnKt injection; Inject 0.8 mLs (40 mg total) into the skin every 14 (fourteen) days.    Osteopenia, unspecified location    Age-related osteoporosis without current pathological fracture  -     DXA Bone Density Spine And Hip; Future    Seropositive rheumatoid arthritis of multiple sites  -     Ambulatory Referral to Physical/Occupational Therapy    Primary osteoarthritis of both hands  -     Ambulatory Referral to Physical/Occupational Therapy        rtc in 3 months

## 2018-02-28 ENCOUNTER — OFFICE VISIT (OUTPATIENT)
Dept: HEMATOLOGY/ONCOLOGY | Facility: CLINIC | Age: 83
End: 2018-02-28
Payer: MEDICARE

## 2018-02-28 ENCOUNTER — TELEPHONE (OUTPATIENT)
Dept: PHARMACY | Facility: HOSPITAL | Age: 83
End: 2018-02-28

## 2018-02-28 ENCOUNTER — OUTPATIENT CASE MANAGEMENT (OUTPATIENT)
Dept: ADMINISTRATIVE | Facility: OTHER | Age: 83
End: 2018-02-28

## 2018-02-28 ENCOUNTER — LAB VISIT (OUTPATIENT)
Dept: LAB | Facility: HOSPITAL | Age: 83
End: 2018-02-28
Attending: INTERNAL MEDICINE
Payer: MEDICARE

## 2018-02-28 VITALS
HEIGHT: 65 IN | BODY MASS INDEX: 25.49 KG/M2 | RESPIRATION RATE: 16 BRPM | WEIGHT: 153 LBS | HEART RATE: 84 BPM | TEMPERATURE: 99 F | SYSTOLIC BLOOD PRESSURE: 120 MMHG | DIASTOLIC BLOOD PRESSURE: 59 MMHG | OXYGEN SATURATION: 95 %

## 2018-02-28 DIAGNOSIS — D50.0 IRON DEFICIENCY ANEMIA DUE TO CHRONIC BLOOD LOSS: ICD-10-CM

## 2018-02-28 DIAGNOSIS — D50.0 IRON DEFICIENCY ANEMIA DUE TO CHRONIC BLOOD LOSS: Primary | ICD-10-CM

## 2018-02-28 DIAGNOSIS — D63.8 ANEMIA OF CHRONIC DISEASE: ICD-10-CM

## 2018-02-28 LAB
BASOPHILS # BLD AUTO: 0.01 K/UL
BASOPHILS NFR BLD: 0.1 %
DIFFERENTIAL METHOD: ABNORMAL
EOSINOPHIL # BLD AUTO: 0.2 K/UL
EOSINOPHIL NFR BLD: 2.9 %
ERYTHROCYTE [DISTWIDTH] IN BLOOD BY AUTOMATED COUNT: 14.3 %
FERRITIN SERPL-MCNC: 811 NG/ML
HBV CORE AB SERPL QL IA: NEGATIVE
HBV SURFACE AB SER-ACNC: NEGATIVE M[IU]/ML
HBV SURFACE AG SERPL QL IA: NEGATIVE
HCT VFR BLD AUTO: 33.2 %
HCV AB SERPL QL IA: NEGATIVE
HGB BLD-MCNC: 10.2 G/DL
HIV 1+2 AB+HIV1 P24 AG SERPL QL IA: NEGATIVE
IMM GRANULOCYTES # BLD AUTO: 0.01 K/UL
IMM GRANULOCYTES NFR BLD AUTO: 0.1 %
LYMPHOCYTES # BLD AUTO: 1.1 K/UL
LYMPHOCYTES NFR BLD: 13.7 %
MCH RBC QN AUTO: 24.7 PG
MCHC RBC AUTO-ENTMCNC: 30.7 G/DL
MCV RBC AUTO: 80 FL
MONOCYTES # BLD AUTO: 1.1 K/UL
MONOCYTES NFR BLD: 14.5 %
NEUTROPHILS # BLD AUTO: 5.3 K/UL
NEUTROPHILS NFR BLD: 68.7 %
NRBC BLD-RTO: 0 /100 WBC
PLATELET # BLD AUTO: 274 K/UL
PMV BLD AUTO: 9.1 FL
RBC # BLD AUTO: 4.13 M/UL
WBC # BLD AUTO: 7.71 K/UL

## 2018-02-28 PROCEDURE — 99999 PR PBB SHADOW E&M-EST. PATIENT-LVL III: CPT | Mod: PBBFAC,,, | Performed by: INTERNAL MEDICINE

## 2018-02-28 PROCEDURE — 82728 ASSAY OF FERRITIN: CPT

## 2018-02-28 PROCEDURE — 99215 OFFICE O/P EST HI 40 MIN: CPT | Mod: S$PBB,,, | Performed by: INTERNAL MEDICINE

## 2018-02-28 PROCEDURE — 36415 COLL VENOUS BLD VENIPUNCTURE: CPT

## 2018-02-28 PROCEDURE — 99213 OFFICE O/P EST LOW 20 MIN: CPT | Mod: PBBFAC | Performed by: INTERNAL MEDICINE

## 2018-02-28 PROCEDURE — 85025 COMPLETE CBC W/AUTO DIFF WBC: CPT

## 2018-02-28 NOTE — PROGRESS NOTES
Telephonic follow-up with patient. Patient states that she has received the RTA application in the mail and will complete it as needed. Patient reports that she continues to have sitter services and involvement in care from her family.  No other social support needs identified at this time.  Discharge discussed.  Patient will call if additional support is needed in the future.  Case closed.  OPCM RN notified.

## 2018-03-01 ENCOUNTER — CLINICAL SUPPORT (OUTPATIENT)
Dept: REHABILITATION | Facility: HOSPITAL | Age: 83
End: 2018-03-01
Attending: INTERNAL MEDICINE
Payer: MEDICARE

## 2018-03-01 ENCOUNTER — EXTERNAL CHRONIC CARE MANAGEMENT (OUTPATIENT)
Dept: PRIMARY CARE CLINIC | Facility: CLINIC | Age: 83
End: 2018-03-01
Payer: MEDICARE

## 2018-03-01 DIAGNOSIS — M54.2 NECK PAIN: ICD-10-CM

## 2018-03-01 DIAGNOSIS — R29.898 UPPER EXTREMITY WEAKNESS: ICD-10-CM

## 2018-03-01 LAB
MITOGEN NIL: 0.79 IU/ML
NIL: 0.02 IU/ML
TB ANTIGEN NIL: -0 IU/ML
TB ANTIGEN: 0.02 IU/ML
TB GOLD: NEGATIVE

## 2018-03-01 PROCEDURE — G8978 MOBILITY CURRENT STATUS: HCPCS | Mod: CK,KX,PO

## 2018-03-01 PROCEDURE — G8979 MOBILITY GOAL STATUS: HCPCS | Mod: CK,KX,PO

## 2018-03-01 PROCEDURE — 97110 THERAPEUTIC EXERCISES: CPT | Mod: KX,PO

## 2018-03-01 NOTE — PATIENT INSTRUCTIONS
Chin Tuck, Supine    Lie on your back, head on small, rolled towel. Gently tuck chin and bring toward your throat while pushing the back of your head down. Do not lift your head or look towards your feet. Hold 5 seconds. Do not hold your breath.  Repeat 10 times per session. Do 2 sessions per day.    AROM: Neck Rotation        Turn head slowly to look over one shoulder, then the other. Hold each position __3__ seconds.  Repeat __10__ times per set. Do _1___ sets per session. Do __1__ sessions per day.     https://orth.[employer].us/294     Copyright © I. All rights reserved.

## 2018-03-01 NOTE — PROGRESS NOTES
Subjective:       Patient ID: Selma Rosado MD Jose J is a 82 y.o. female.    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 Upper and lower endoscopy has been negative for GI blood loss. Urinalysis is negative for hematuria. There is no clinical blood loss.   Dietary heme iron is adequate. She takes interval iron supplements. She remains anemic and iron deficient.     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   CBC is slighlty worse, 1 gram decrease 11.5 to 10.2 grams. Ferritin now elevated over 800.   Presents with signs of active arthritis, suggesting change in anemia is inflammatory in nature  Also reports stroke since last visit.        Anemia   There has been no bruising/bleeding easily.     Review of Systems   Constitutional: Negative.    HENT: Negative.    Eyes: Negative.    Respiratory: Negative.    Cardiovascular: Positive for leg swelling.   Gastrointestinal: Negative for blood in stool.   Endocrine: Negative.    Genitourinary: Negative for hematuria.   Musculoskeletal: Positive for arthralgias.   Skin: Negative.    Allergic/Immunologic: Negative for environmental allergies, food allergies and immunocompromised state.   Neurological: Negative.    Hematological: Negative for adenopathy. Does not bruise/bleed easily.   Psychiatric/Behavioral: Negative.        Objective:      Physical Exam   Constitutional: She is oriented to person, place, and time. She appears well-developed and well-nourished.   HENT:   Head: Normocephalic and atraumatic.   Eyes: Conjunctivae are normal.   Neck: Normal range of motion. Neck supple.   Cardiovascular: Normal rate and intact distal pulses.    Pulmonary/Chest: Effort normal. No respiratory distress.   Abdominal: She exhibits no distension. There is no tenderness.   Neurological: She is alert and oriented to person, place, and time.   Skin: Skin is warm and dry.    Psychiatric: She has a normal mood and affect. Her behavior is normal. Judgment and thought content normal.   Nursing note and vitals reviewed.      Assessment:       1. Iron deficiency anemia due to chronic blood loss    2. Anemia of chronic disease        Plan:       Repeat CBC and ferritin in 4-6 months  Continue oral iron  Continue dietary heme iron  Follow-up with ID and Rheumatology for treatment to improve inflammatory anemia.

## 2018-03-01 NOTE — PROGRESS NOTES
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 An MRI showed small remote punctate infarct and likely age advanced chronic microvascular ischemic changes. An echo showed normal EF with grade II diastolic dysfunction, biatrial enlargement.     She denies any prior hx of heart failure or BEJARANO/SOB, PND, orthopnea, SANAZ. Her BP is well controlled on norvasc 5. She ambulates with a cane and remains active. No prior hx of atrial fibrillation, EKGs reviewed. She has been on fish oil and clopidogrel for her cerebrovascular disease. Her last lipid panel LDL 78, HDL 76 in 5/2017. A carotid U/S was negative for significant carotid artery stenosis.    ROS:    Constitution: Negative for fever or chills. Negative for weight loss or gain.   HENT: Negative for sore throat or headaches. Negative for rhinorrhea.  Eyes: Negative for blurred or double vision.   Cardiovascular: See above  Pulmonary: Negative for SOB. Negative for cough.   Gastrointestinal: Negative for abdominal pain. Negative for nausea/ vomiting. Negative for diarrhea.   : Negative for dysuria.   Neurological: Negative for focal weakness or sensory changes.  PMH:     Past Medical History:   Diagnosis Date    Anemia     Arthritis     Hashimoto's disease     Hypertension     IGT (impaired glucose tolerance) 1/12/2016    Joint pain     Multinodular goiter 1/12/2016     Past Surgical History:   Procedure Laterality Date    CATARACT EXTRACTION      COLONOSCOPY N/A 9/29/2015    Procedure: COLONOSCOPY;  Surgeon: FIDELINA Sanchez MD;  Location: [address];  Service: Endoscopy;  Laterality: N/A;    JOINT REPLACEMENT      right knee    KNEE SURGERY Left 12/31/2013    TKR    left parotidectomy      mixed tumor    SALIVARY GLAND SURGERY       TONSILLECTOMY       Allergies:   Review of patient's allergies indicates:  No Known Allergies  Medications:     Current Outpatient Prescriptions on File Prior to Visit   Medication Sig Dispense Refill    adalimumab (HUMIRA PEN) PnKt injection Inject 0.8 mLs (40 mg total) into the skin every 14 (fourteen) days. 2 each 11    amlodipine (NORVASC) 5 MG tablet TAKE 1 TABLET DAILY 90 tablet 2    baclofen (LIORESAL) 10 MG tablet Take 1 tablet (10 mg total) by mouth 3 (three) times daily. 270 tablet 2    deutetrabenazine (AUSTEDO) 9 mg Tab Take 2 tablets by mouth 2 (two) times daily. Final titrating dose - needs initially titration pack from company 120 tablet 11    diazePAM (VALIUM) 2 MG tablet Take 1 tablet (2 mg total) by mouth every evening. 30 tablet 3    ferrous sulfate 325 (65 FE) MG EC tablet Take 325 mg by mouth once daily.       gabapentin (NEURONTIN) 300 MG capsule Take 1-2 capsules (300-600 mg total) by mouth 3 (three) times daily. 540 capsule 1    hydrocodone-acetaminophen 5-325mg (NORCO) 5-325 mg per tablet Take 1 tablet by mouth every 24 hours as needed for Pain. 30 tablet 0    hydroxychloroquine (PLAQUENIL) 200 mg tablet TAKE 2 TABLETS ONCE DAILY 180 tablet 1    linaclotide (LINZESS) 145 mcg Cap capsule Take 1 capsule (145 mcg total) by mouth once daily. 30 capsule 0    montelukast (SINGULAIR) 10 mg tablet TAKE 1 TABLET EVERY EVENING 90 tablet 2    omega 3-dha-epa-fish oil 250-350-1,000 mg Cap Take 1 capsule by mouth 2 (two) times daily. 180 capsule 3    omeprazole (PRILOSEC) 20 MG capsule       predniSONE (DELTASONE) 5 MG tablet TAKE 2 TABLETS ONCE DAILY 180 tablet 0    sertraline (ZOLOFT) 25 MG tablet Take 1 tablet (25 mg total) by mouth once daily. 90 tablet 1    thiamine 100 MG tablet Take 1 tablet (100 mg total) by mouth once daily.      clopidogrel (PLAVIX) 75 mg tablet Take 1 tablet (75 mg total) by mouth once daily. 90 tablet 1    memantine (NAMENDA XR) 7-14-21-28 mg C24k Follow  "titration instructions 7 mg x1. 14 mg x1 week. 21 mg x1 week. 28 mg x1 week. 1 each 0    memantine 28 mg CSpX Take 1 capsule (28 mg total) by mouth once daily. START after titration pack completed. 30 capsule 3     No current facility-administered medications on file prior to visit.      Social History:     Social History   Substance Use Topics    Smoking status: Never Smoker    Smokeless tobacco: Never Used    Alcohol use No      Comment: very seldom      Family History:     Family History   Problem Relation Age of Onset    Hypertension Mother     Prostate cancer Father      prostate cancer    Breast cancer Maternal Grandmother     Lupus Neg Hx     Psoriasis Neg Hx     Melanoma Neg Hx     Colon cancer Neg Hx      Physical Exam:   /60 (BP Location: Left arm, Patient Position: Sitting, BP Method: Medium (Automatic))   Pulse 81   Ht 5' 5" (1.651 m)   Wt 70.8 kg (156 lb 1.4 oz)   LMP  (LMP Unknown)   BMI 25.97 kg/m²      Constitutional: No acute distress, conversant  HEENT: Sclera anicteric, extraocular motions intact  Neck: No JVD, no carotid bruits  Cardiovascular: regular rate and rhythm, no murmur, rubs or gallops, normal S1/S2  Pulmonary: Clear to auscultation bilaterally  Abdominal: Abdomen soft, nontender, nondistended, positive bowel sounds, no abdominal bruits  Extremities: No lower extremity edema   Pulses: 2+ BL Radial, 2+ BL carotid, 2+ BL PT  Skin: No rash  Psych: Alert and oriented x 3, appropriate affect  Neuro: CNII-XII intact, no focal deficits    Labs:     Lab Results   Component Value Date     02/27/2018    K 4.6 02/27/2018     02/27/2018    CO2 28 02/27/2018    BUN 16 02/27/2018    CREATININE 0.9 02/27/2018    ANIONGAP 7 (L) 02/27/2018     Lab Results   Component Value Date    AST 13 02/27/2018    ALT 11 02/27/2018    ALKPHOS 68 02/27/2018    BILITOT 0.5 02/27/2018    ALBUMIN 3.1 (L) 02/27/2018     Lab Results   Component Value Date    CALCIUM 9.3 02/27/2018    MG " 1.9 12/01/2017    PHOS 3.1 05/02/2011     No results found for: BNP, BNPTRIAGEBLO Lab Results   Component Value Date    WBC 7.71 02/28/2018    HGB 10.2 (L) 02/28/2018    HCT 33.2 (L) 02/28/2018     02/28/2018    GRAN 5.3 02/28/2018    GRAN 68.7 02/28/2018     Lab Results   Component Value Date    INR 2.2 (H) 05/23/2011     Lab Results   Component Value Date    CHOL 166 05/10/2017    HDL 76 (H) 05/10/2017    LDLCALC 77.8 05/10/2017    TRIG 61 05/10/2017     Lab Results   Component Value Date    HGBA1C 5.5 08/24/2017     Lab Results   Component Value Date    TSH 0.775 08/24/2017    E4WBNNG 5.8 07/07/2014          Imaging:   MRI Brain 8/29/2017  No acute infarction.  Small punctate remote infarcts noted within the right centrum semiovale, left anterior thalamus, right sultana-tatyana and bilateral cerebellar hemispheres.  Prominent patchy T2/FLAIR signal hyperintensity noted throughout the supratentorial periventricular white matter and tatyana, nonspecific but most suggestive of age advanced chronic microvascular ischemic changes.  There is mild generalized cerebral volume loss.  No midline shift or mass effect.  No hydrocephalus.  No enhancing mass lesions.  No midline shift or mass effect.  Intracranial T2 flow voids are present. Sellar regions is unremarkable. Cerebellar tonsils are in their expected location. Cervicomedullary junctions is normal.     Paranasal sinuses and mastoids are clear.  No marrow replacement process.  Orbits are within normal limits.   Impression         No acute intracranial abnormalities.  Specifically, no acute infarction or enhancing mass lesions.    Punctate remote infarct within the right centrum semiovale, left anterior thalamus and right sultana-tatyana.    Age advanced chronic microvascular ischemic changes.         EF   Date Value Ref Range Status   01/03/2018 55 55 - 65      1 - Normal left ventricular systolic function (EF 55-60%).     2 - No wall motion abnormalities.     3 - Biatrial  enlargement.     4 - Impaired LV relaxation, elevated LAP (grade 2 diastolic dysfunction).     5 - Normal right ventricular systolic function .     6 - The estimated PA systolic pressure is 37 mmHg.     7 - Mild mitral regurgitation.     8 - Mild tricuspid regurgitation.     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 Prior workup with MRI with remote punctate infarcts c/w microvascular disease. Carotid U/S without significant stenosis. Echo with diastolic dysfunction and biatrial enlargement. No history of atrial fibrillation and no suggestion on MRI of cardioembolic disease. On medical therapy with clopidogrel. No indication for further testing at this time given etiology of remote infarcts not consistent with embolic or crytogenic, though discussed option of further investigation (ie event monitor) for Af. Could consider if future symptoms concerning for presence of AF. No signs/symptoms of heart failure; presence of grade II diastolic dysfunction is a nonspecific findings and no clinical evidence of heart failure. BP well controlled on current therapy, continue to keep BP well controlled and advised to monitor for any new symptoms (unexplained weight gain, LE swelling, new dyspnea) that could represent heart failure.    1. Small vessel disease, cerebrovascular     2. Hypertension, essential     3. Diastolic dysfunction without heart failure       Plan:   -Continue current management  -Discussed above with Dr. Lux, in agreement with plan  -Can return prn to cardiology clinic and advised to call or message with any questions    Seen and discussed with Dr. Gallagher.    Signed:    Demond Wolf MD  Cardiology Fellow PGY4  Beeper:  [phone]  2/28/2018 11:03 PM    Follow-up:   Future Appointments  Date Time Provider Department Center   3/1/2018 11:00 AM Laura Michael,  PT [facility]   3/2/2018 11:00 AM Laura Michael, PT [facility]   3/5/2018 11:00 AM Laura Michael, PT [facility]   3/7/2018 11:00 AM Nathen Estes MD [facility] ID [address]   3/8/2018 11:00 AM Laura Michael, PT [facility]   3/12/2018 11:00 AM Laura Michael, PT [facility]   3/13/2018 10:00 AM Alyssa Chu MD [facility] PSYCH [address]   3/14/2018 11:00 AM Laura Michael, PT [facility]   3/14/2018 1:00 PM Cliff Paul, OT [facility]   3/14/2018 2:30 PM Branden Rubio MD [facility] NEURO [address]   3/19/2018 11:00 AM Laura Michael, PT [facility]   3/21/2018 1:00 PM Glendy Xiong, ANA [facility]   3/22/2018 11:00 AM Laura Michael, PT [facility]   3/26/2018 11:00 AM Laura Michael, PT [facility]   3/27/2018 8:15 AM Jessica Thacker MD [facility] HAND [facility]   3/29/2018 11:00 AM Laura Michael, PT [facility]   4[facility]19/2018 9:00 AM [facility]   5/10/2018 4:00 PM Bhargav Lee MD [facility] IM [facility]

## 2018-03-02 ENCOUNTER — CLINICAL SUPPORT (OUTPATIENT)
Dept: REHABILITATION | Facility: HOSPITAL | Age: 83
End: 2018-03-02
Attending: INTERNAL MEDICINE
Payer: MEDICARE

## 2018-03-02 ENCOUNTER — OUTPATIENT CASE MANAGEMENT (OUTPATIENT)
Dept: ADMINISTRATIVE | Facility: OTHER | Age: 83
End: 2018-03-02

## 2018-03-02 ENCOUNTER — TELEPHONE (OUTPATIENT)
Dept: INTERNAL MEDICINE | Facility: CLINIC | Age: 83
End: 2018-03-02

## 2018-03-02 DIAGNOSIS — R29.898 UPPER EXTREMITY WEAKNESS: ICD-10-CM

## 2018-03-02 DIAGNOSIS — M54.2 NECK PAIN: ICD-10-CM

## 2018-03-02 PROCEDURE — 97110 THERAPEUTIC EXERCISES: CPT | Mod: KX,PO

## 2018-03-02 PROCEDURE — 97140 MANUAL THERAPY 1/> REGIONS: CPT | Mod: KX,PO

## 2018-03-02 NOTE — PROGRESS NOTES
"Name: Selma Rosado MD Jose J  Clinic Number: [id number]  Date of Treatment: 03/02/2018   Diagnosis:   Encounter Diagnoses   Name Primary?    Neck pain     Upper extremity weakness        Physician: Dee Thomson, *    Time in: 11:00 AM  Time Out: 11: 50 AM  Total Treatment Time: 50 min   Last G-code: CK  Last PN: NA  Date of eval:12/01/2017  Visit #: 2/12  Auth expiration: 04/05/2018  POC expiration: 06/02/2018    Precautions: Standard     Subjective     Selma reports her neck feels like it always does in the morning, but it is feeling a little better than yesterday.  Patient reports their pain to be 5/10 on a 0-10 scale with 0 being no pain and 10 being the worst pain imaginable.    Objective     Selma received therapeutic exercises to develop strength, endurance, ROM and posture for 30 minutes including:   Chin tucks 20 x 5" hold   AAROM cervical rotation in supine x 10 each   Seated SCOM stretch 3 x 20"   Standing scap retraction YTB x 20   PROM - cervical rotation and sidebending     (next visit: aure)    Selma received the following manual therapy techniques: Soft tissue Mobilization were applied to the: cervical spine for 10 minutes.   Gentle SCOM longitudinal stroking and strumming proximal 1/3 of SCOM  Cervical distraction     MHP applied to cervical spine x 8 minutes to promote pain control and relaxation. Pt skin intact following removal of MHP.     Written Home Exercises Provided: No, pt instructed to continue with previously issued HEP    Pt educated on new therex and purpose. Pt demo good understanding of the education provided. Selma demonstrated good return demonstration of activities.     Assessment   Selma participated in today's treatment session without symptom provocation or adverse effects. Pt demonstrated increased tone in L SCOM. Pt with improved DNF activation during chin tucks with no recruitment from SCOM. Will continue to monitor and progress as tolerated. Pt will " continue to benefit from skilled PT intervention. Medical Necessity is demonstrated by:  Pain limits function of effected part for some activities, Unable to participate fully in daily activities and Weakness.    Patient is making good progress towards established goals.    New/Revised goals: none at this time      Plan   Continue with established Plan of Care towards PT goals.     Laura Michael, PT

## 2018-03-02 NOTE — PLAN OF CARE
OUTPATIENT PHYSICAL THERAPY  PHYSICAL THERAPY RE- EVALUATION    Name: Selma Lux MD  Clinic Number: [id number]    Evaluation Date: 12/01/2017  Visit #: 2 / 12  Authorization period Expiration: 04/05/2019  Updated Plan of Care Expiration: 06/02/2018  Precautions: Standard     Diagnosis:   Encounter Diagnoses   Name Primary?    Neck pain     Upper extremity weakness      Physician: Dee Thomson, *  Treatment Orders: PT Eval and Treat  Past Medical History:   Diagnosis Date    Anemia     Arthritis     Hashimoto's disease     Hypertension     IGT (impaired glucose tolerance) 1/12/2016    Joint pain     Multinodular goiter 1/12/2016     Current Outpatient Prescriptions   Medication Sig    adalimumab (HUMIRA PEN) PnKt injection Inject 0.8 mLs (40 mg total) into the skin every 14 (fourteen) days.    amlodipine (NORVASC) 5 MG tablet TAKE 1 TABLET DAILY    baclofen (LIORESAL) 10 MG tablet Take 1 tablet (10 mg total) by mouth 3 (three) times daily.    clopidogrel (PLAVIX) 75 mg tablet Take 1 tablet (75 mg total) by mouth once daily.    deutetrabenazine (AUSTEDO) 9 mg Tab Take 2 tablets by mouth 2 (two) times daily. Final titrating dose - needs initially titration pack from company    diazePAM (VALIUM) 2 MG tablet Take 1 tablet (2 mg total) by mouth every evening.    ferrous sulfate 325 (65 FE) MG EC tablet Take 325 mg by mouth once daily.     gabapentin (NEURONTIN) 300 MG capsule Take 1-2 capsules (300-600 mg total) by mouth 3 (three) times daily.    hydrocodone-acetaminophen 5-325mg (NORCO) 5-325 mg per tablet Take 1 tablet by mouth every 24 hours as needed for Pain.    hydroxychloroquine (PLAQUENIL) 200 mg tablet TAKE 2 TABLETS ONCE DAILY    linaclotide (LINZESS) 145 mcg Cap capsule Take 1 capsule (145 mcg total) by mouth once daily.    memantine (NAMENDA XR) 7-14-21-28 mg C24k Follow titration instructions 7 mg x1. 14 mg x1 week. 21 mg x1 week. 28 mg x1 week.    memantine 28 mg CSpX  "Take 1 capsule (28 mg total) by mouth once daily. START after titration pack completed.    montelukast (SINGULAIR) 10 mg tablet TAKE 1 TABLET EVERY EVENING    omega 3-dha-epa-fish oil 250-350-1,000 mg Cap Take 1 capsule by mouth 2 (two) times daily.    omeprazole (PRILOSEC) 20 MG capsule     predniSONE (DELTASONE) 5 MG tablet TAKE 2 TABLETS ONCE DAILY    sertraline (ZOLOFT) 25 MG tablet Take 1 tablet (25 mg total) by mouth once daily.    thiamine 100 MG tablet Take 1 tablet (100 mg total) by mouth once daily.     No current facility-administered medications for this visit.      Review of patient's allergies indicates:  No Known Allergies    History   Prior Therapy: Prior therapy for gait impairments; none for therapy   Social History: Lives alone, next door to sister   Previous functional status: Has assistance with strenuous activity   Current functional status: Mostly independent with basic ADLs; requires assistance with housework and getting into/out bathtub   Work: Not currently working     Subjective   History of Present Illness: Pt presents to [facility] with complaints of L lateral and anterior neck pain that began approximately 8 weeks ago. Pt present for initial evaluation of neck pain in December, but presented with emergent changes in cognitive status and overall functional mobility that was addressed first. Pt now continues to have neck pain and would like to focus on resolving the issue. Pt reports pain feels "vascular" in L anterolateral neck. Pt reports she received a botox injection in the L SCM 2 weeks ago, but has not found any relief since. Pt reports difficulty with prolonged positioning, and finding a comfortable position with sleeping.     DOI: 6 - 8 weeks ago  Imaging, bone scan films: "2 mm anterolisthesis of C2 on C3 which appears to be degenerative in etiology.  This increases to 3 mm on the flexion radiograph of the cervical spine. Cervical spondylosis.Atherosclerosis."  Pain: " "current 6/10, worst 10/10, best 3/10, Aching, intermittent  Radicular symptoms: none   Aggravating factors: standing for long periods, sleeping   Easing factors: rest, heat, massage   Pts goals: Reduce neck pain     Objective   Mental status: alert, oriented x3  Posture/ Alignment: In sitting, pt with increased L lateral trunk leand and L cervical sidebending    Movement Analysis: Pt utilizes accessory trunk motion with cervical spine AROM.      GAIT DEVIATIONS: Selma amb with decreased dora, decreased step length and decreased weight-shifting ability.    ROM:   Cervical Range of Motion:    Degrees Pain   Flexion 55      Extension 60      Right Rotation 45 Compensation with trunk mobility     Left Rotation 35      Right Side Bending 35 compensation   Left Side Bending 30       Pt limited in L shoulder elevation (AROM < 100 deg) when compared bilaterally.       Strength:    Right Left Comment   Shoulder flexion: 4/5 4/5    Shoulder Abduction: 4/5 4/5    Elbow Flexion: 5/5 5/5    Elbow Extension: 5/5 5/5     5/5 5/5        Special Tests:  Cervical Distraction: (+)  Pt with impaired DNF firing, pt unable to contract DNF without SCOM recruitment     Neurovascular:  - Sensation: grossly intact to light touch across BUE   - Davis: negative  - Clonus: negative       Palpation:  Pt TTP along L cervical paraspinals and mastoid process. Pt with increased tone in suboccipital musculature.     Joint Play:  Hypomobile C3/4 - C6/7 with downglide assessment   Hypomobile OA jt in forward bending   A/A mobility not assessed     Pt/family was provided educational information, including: PT evaluation findings, basic cervical anatomy, role of PT, goals for PT, scheduling - pt verbalized understanding. Discussed insurance plan with pt.     TREATMENT   Time In: 11:02 AM  Time Out: 12:00 PM    Discussed Plan of Care with patient: Yes    Selma received 5 minutes of therapeutic exercises including:   Chin tucks 10 x 5" hold "   Supine Cervical AROM rotation x 10 each     Pt received MHP to cervical spine x 5 min to promote pain control and relaxation.       Written Home Exercises Provided: See Patient Instructions   Exercises were reviewed and Selma was able to demonstrate them prior to the end of the session. Pt received a written copy of exercises to perform at home. Selma demonstrated good  understanding of the education provided.     Assessment   Selma is a 82 y.o. female referred to outpatient physical therapy with a medical diagnosis of cervicalgia. Pt demonstrates hypomobility throughout mid cervical spine, decreased strength and neuromuscular control of DNF, decreased strength of BUE, and decreased AROM of cervical spine. Selma is a good candidate for skilled therapy services at this time to restore cervical AROM and muscular stability, so she may return to PLOF. Pt prognosis is Fair. Positive prognostic factors include motivation and compliance with therapy. Negative prognostic factors include severity and chronicity of symptoms. Pt will benefit from skilled outpatient physical therapy to address the above stated deficits, provide pt/family education, and to maximize pt's level of independence.     History  Co-morbidities and personal factors that may impact the plan of care Examination  Body Structures and Functions, activity limitations and participation restrictions that may impact the plan of care    Clinical Presentation   Co-morbidities:   h/o CVA        Personal Factors:   no deficits Body Regions:   neck    Body Systems:   ROM  strength        Participation Restrictions:   Pt unable to tolerate prolonged positions such has sitting and laying.      Activity limitations:   Learning and applying knowledge  no deficits    General Tasks and Commands  no deficits    Communication  no deficits    Mobility  no deficits    Self care  no deficits    Domestic Life  no deficits    Interactions/Relationships  no deficits    Life  Areas  no deficits    Community and Social Life  no deficits         stable and uncomplicated                      low   low  low Decision Making/ Complexity Score:  low     G Codes:   CMS Impairment/Limitation/Restriction for FOTO Neck Survey    Status   Limitation   Intake   49%   51%  Predicted  51%   49%     G-Code   CMS Severity Modifier  Current Status  CK - At least 40 percent but less than 60 percent  Goal Status+   CK - At least 40 percent but less than 60 percent    Pt's spiritual, cultural and educational needs considered and pt agreeable to plan of care and goals as stated below:     Anticipated Barriers for physical therapy: none     Short Term GOALS:  In 4 weeks, pt. will:  Report decreased neck pain pain < / = 5 /10 at worst to increase tolerance for ADLs  Increased cervical B sidebending AROM by > / = 10 degrees in order to tolerate sidelying positions   Pt will be able to perform 10 upper cervical flexion nods in supine without fatigue in order to improve strength for proper posture  Pt will report compliance and tolerance to HEP    Long Term GOALS:  In 8 weeks, pt. Will:  Report decreased neck pain pain < / = 3 /10 at worst to increase tolerance for ADLs  Pt will increase cervical flexion MMT strength by > / = 1 grade in order to improve cervical stability  Pt will be able to hold deep neck flexion for 10 sec without fatigue in order to improve upright posture  ncreased cervical B rotation AROM by > / = 10 degrees in order to tolerate supine and sidelying positions to improve sleep quality   - be independent with HEP and SX management     Plan   Outpatient physical therapy 1 - 2 times weekly to include: pt ed, HEP, therapeutic exercises, therapeutic activities, neuromuscular re-education/ balance exercises, manual therapy, dry needling, and modalities prn. Cont PT for 6 -8 weeks. Pt may be seen by PTA as part of the rehabilitation team.     I certify the need for these services furnished under this  plan of treatment and while under my care.    Laura Michael, PT

## 2018-03-02 NOTE — PROGRESS NOTES
"3/2/18  Coordination of care maintained. Refer to closed case encounter 2/18/18 for Aracely Quintana, NATALIYA with [facility]. All social needs addressed with patient.  CM spoke by phone with Dr. Lux. She asks about any other transportation options besides [employer]? This CM is aware that LCSW has addressed pt needs as has her daughter, to ensure that she has transportation to her med appts/OP PT appts. CM stated that the [employer] L[employer] is the only para transit option known, that requires filling their application by pt/family then MD signature. Dr. Lux states that she has the application and may fill it out. She says she can call her sister and a friend and her daughter set her up with "uber" as well.   She reports now sleeping in her own bed and spending time with her sister, who lives next door. They check on each other closely. Dr. Lux's daughter, Rosy shall be returning to town in a few weeks.   She denies any further concerns.   Dr. Lux states she will call if she has any future concerns.   Case closed.    Notified Dr. Lee.   "

## 2018-03-02 NOTE — TELEPHONE ENCOUNTER
----- Message from Domonique Baez RN sent at 3/2/2018 10:03 AM CST -----  Contact: MARILYN Jerez Dr./Staff:    Notification of case closure. All needs addressed. Dr. Lux has a sitter in morning and relies upon her sister next door and a friend. Daughter, Rosy is expected for return visit soon.     Thank you,    OWEN Jerez, RN, CCM [facility]  TEL:  [phone]

## 2018-03-05 ENCOUNTER — CLINICAL SUPPORT (OUTPATIENT)
Dept: REHABILITATION | Facility: HOSPITAL | Age: 83
End: 2018-03-05
Attending: INTERNAL MEDICINE
Payer: MEDICARE

## 2018-03-05 ENCOUNTER — TELEPHONE (OUTPATIENT)
Dept: NEUROLOGY | Facility: HOSPITAL | Age: 83
End: 2018-03-05

## 2018-03-05 DIAGNOSIS — R29.898 UPPER EXTREMITY WEAKNESS: ICD-10-CM

## 2018-03-05 DIAGNOSIS — M54.2 NECK PAIN: ICD-10-CM

## 2018-03-05 PROCEDURE — 97140 MANUAL THERAPY 1/> REGIONS: CPT | Mod: PO

## 2018-03-05 PROCEDURE — 97110 THERAPEUTIC EXERCISES: CPT | Mod: PO

## 2018-03-05 NOTE — PROGRESS NOTES
"Name: Selma Rosado MD Jose J  Clinic Number: [id number]  Date of Treatment: 03/05/2018   Diagnosis:   Encounter Diagnoses   Name Primary?    Neck pain     Upper extremity weakness        Physician: Dee Thomson, *    Time in: 11:00 AM  Time Out: 11: 40 AM  Total Treatment Time: 40 min   Last G-code: CK  Last PN: NA  Date of eval:12/01/2017  Visit #: 2/12  Auth expiration: 04/05/2018  POC expiration: 06/02/2018    Precautions: Standard     Subjective     Selma reports her neck feels much better since last treatment session. However, she continues to have a "hollow sensation" like pressure changing anterior to her L ear. Pt reports she is having a lot of discomfort today due to her IBS.  Patient reports their pain to be 4/10 on a 0-10 scale with 0 being no pain and 10 being the worst pain imaginable.    Objective     Selma received therapeutic exercises to develop strength, endurance, ROM and posture for 15 minutes including:   Chin tucks 20 x 5" hold   AAROM cervical rotation in supine x 10 each   Seated SCOM stretch 3 x 20" (NP)  Standing scap retraction YTB x 20  (NP)  PROM - cervical rotation and sidebending     (next visit: bruggers)    Selma received the following manual therapy techniques: Soft tissue Mobilization were applied to the: cervical spine for 10 minutes.   Gentle SCOM longitudinal stroking and strumming proximal 1/3 of SCOM  Cervical distraction     MHP applied to cervical spine x 8 minutes to promote pain control and relaxation. Pt skin intact following removal of MHP.     Written Home Exercises Provided: No, pt instructed to continue with previously issued HEP    Pt educated on new therex and purpose. Pt demo good understanding of the education provided. Selma demonstrated good return demonstration of activities.     Assessment   Pt treatment session modified to accommodate for severe stomach cramping. Pt unable to tolerate multiple positions to perform therex due to IBS flare up. Pt " reports she has been feeling this way for several days. Pt with improvements in L SCOM muscle tone since last treatment session. Pt may benefit from referral to ENT should symptoms fail to subside. Pt will continue to benefit from skilled PT intervention. Medical Necessity is demonstrated by:  Pain limits function of effected part for some activities, Unable to participate fully in daily activities and Weakness.    Patient is making good progress towards established goals.    New/Revised goals: none at this time      Plan   Continue with established Plan of Care towards PT goals.     Laura Michael, PT

## 2018-03-07 ENCOUNTER — OFFICE VISIT (OUTPATIENT)
Dept: INFECTIOUS DISEASES | Facility: CLINIC | Age: 83
End: 2018-03-07
Payer: MEDICARE

## 2018-03-07 ENCOUNTER — CLINICAL SUPPORT (OUTPATIENT)
Dept: INFECTIOUS DISEASES | Facility: CLINIC | Age: 83
End: 2018-03-07
Payer: MEDICARE

## 2018-03-07 VITALS
TEMPERATURE: 98 F | SYSTOLIC BLOOD PRESSURE: 131 MMHG | WEIGHT: 156.31 LBS | HEIGHT: 65 IN | DIASTOLIC BLOOD PRESSURE: 71 MMHG | BODY MASS INDEX: 26.04 KG/M2 | HEART RATE: 91 BPM

## 2018-03-07 DIAGNOSIS — M06.9 RHEUMATOID ARTHRITIS, INVOLVING UNSPECIFIED SITE, UNSPECIFIED RHEUMATOID FACTOR PRESENCE: Primary | ICD-10-CM

## 2018-03-07 DIAGNOSIS — M06.9 RHEUMATOID ARTHRITIS, INVOLVING UNSPECIFIED SITE, UNSPECIFIED RHEUMATOID FACTOR PRESENCE: ICD-10-CM

## 2018-03-07 PROCEDURE — G0009 ADMIN PNEUMOCOCCAL VACCINE: HCPCS | Mod: PBBFAC

## 2018-03-07 PROCEDURE — 90471 IMMUNIZATION ADMIN: CPT | Mod: PBBFAC

## 2018-03-07 PROCEDURE — 99999 PR PBB SHADOW E&M-EST. PATIENT-LVL III: CPT | Mod: PBBFAC,,, | Performed by: INTERNAL MEDICINE

## 2018-03-07 PROCEDURE — 90662 IIV NO PRSV INCREASED AG IM: CPT | Mod: PBBFAC

## 2018-03-07 PROCEDURE — 99213 OFFICE O/P EST LOW 20 MIN: CPT | Mod: PBBFAC,25 | Performed by: INTERNAL MEDICINE

## 2018-03-07 PROCEDURE — 90472 IMMUNIZATION ADMIN EACH ADD: CPT | Mod: PBBFAC

## 2018-03-07 PROCEDURE — 99203 OFFICE O/P NEW LOW 30 MIN: CPT | Mod: S$PBB,,, | Performed by: INTERNAL MEDICINE

## 2018-03-07 NOTE — PROGRESS NOTES
Pt received the influenza-high dose, Tdap, and Prevnar 13 vaccinations. Pt tolerated the injections well. Pt left the unit in NAD.

## 2018-03-07 NOTE — PROGRESS NOTES
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    Chief Complaint:Rheumatoid Arthritis      History of Present Illness    Pt with RA referred by rheumatology for pre-immunotherapy evaluation. 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    The following test results are available:  Hepatitis B core antibody  -  Heptatitis B surface antibody  -  Hepatitis B surface antigen  -  Quantiferon gold test   -  HIV 1/2 antibody   -  RPR     -  Based on this I recommend she take:        influenza vaccine              Prevnar        TDaP                    Shingrix when available      Review of Systems   Constitution: Negative for chills, decreased appetite, fever, weakness, malaise/fatigue, night sweats, weight gain and weight loss.   HENT: Negative for congestion, ear pain, hearing loss, hoarse voice, sore throat and tinnitus.    Eyes: Negative for blurred vision, redness and visual disturbance.   Cardiovascular: Negative for chest pain, leg swelling and palpitations.   Respiratory: Negative for cough, hemoptysis, shortness of breath and sputum production.    Hematologic/Lymphatic: Negative for adenopathy. Does not bruise/bleed easily.   Skin: Negative for dry skin, itching, rash and suspicious lesions.   Musculoskeletal: Positive for joint pain and neck pain. Negative for back pain and myalgias.   Gastrointestinal: Positive for abdominal pain. Negative for constipation, diarrhea, heartburn, nausea and vomiting.   Genitourinary: Positive for frequency and urgency. Negative for dysuria, flank pain, hematuria and hesitancy.   Neurological: Negative for dizziness, headaches, numbness and paresthesias.   Psychiatric/Behavioral: Negative for depression and memory loss. The patient does not have insomnia and is not nervous/anxious.      Objective:   Physical Exam   Constitutional: She is oriented to person, place, and time. She appears well-developed and well-nourished. No distress.   Neurological: She is alert  and oriented to person, place, and time.   Skin: Skin is warm and dry.   Psychiatric: She has a normal mood and affect. Her behavior is normal.   Vitals reviewed.    Assessment:       1. Rheumatoid arthritis, involving unspecified site, unspecified rheumatoid factor presence          Plan:        see HPI

## 2018-03-07 NOTE — TELEPHONE ENCOUNTER
Spoke with Dr. Lux to discuss her namenda and whether or not she had received it.  She stated that she thought it had been delivered, but wasn't sure.  Does not appear that she has been taking it.    We discussed at length the purpose of namenda, but recognize that she is on a significant number of medications and she is not keen to add another medication at this point.  She would like to think about starting namenda at this point.      I asked that she have her sister or daughter help to assess whether namenda was delivered, and if so, to bring it to her upcoming clinic appointment with me on 3/14 at 2:30.  I also asked her to have her daughter or sister accompany her.  At our upcoming appointment, we intend to further assess if namenda has been delivered and if it remains an appropriate medication to start at that time.        Branden Rubio MD

## 2018-03-07 NOTE — LETTER
March 7, 2018      Jacoby Prakash MD  [address] [address]           [facility]  [address]  [place] [address]  Phone: [phone]  Fax: [phone]          Patient: Selmakirsten Rosado MD Jose J   MR Number: [id number]   YOB: 1935   Date of Visit: 3/7/2018       Dear Dr. Jacoby Prakash:    Thank you for referring Selma Lux to me for evaluation. Attached you will find relevant portions of my assessment and plan of care.    If you have questions, please do not hesitate to call me. I look forward to following Selma Lux along with you.    Sincerely,    Nathen Estes MD    Enclosure  CC:  No Recipients    If you would like to receive this communication electronically, please contact [email or web address] or [phone] to request more information on [employer] access.    For providers and/or their staff who would like to refer a patient to [facility], please contact us through our one-stop-shop provider referral line, [facility], at [phone].    If you feel you have received this communication in error or would no longer like to receive these types of communications, please e-mail [email or web address]

## 2018-03-12 DIAGNOSIS — M05.79 RHEUMATOID ARTHRITIS INVOLVING MULTIPLE SITES WITH POSITIVE RHEUMATOID FACTOR: ICD-10-CM

## 2018-03-12 NOTE — TELEPHONE ENCOUNTER
FOR DOCUMENTATION ONLY:  Humira prior authorization approved.   Dates: 2/10/18 through 12/31/99  Case ID: [id number]  $28.00/30 day supply    [facility] is preferred pharmacy. Copay will be cheaper, $24/up to 60 day supply.   [phone] (Phone)  [phone] (Fax)

## 2018-03-13 ENCOUNTER — OFFICE VISIT (OUTPATIENT)
Dept: PSYCHIATRY | Facility: CLINIC | Age: 83
End: 2018-03-13
Payer: MEDICARE

## 2018-03-13 ENCOUNTER — CLINICAL SUPPORT (OUTPATIENT)
Dept: REHABILITATION | Facility: HOSPITAL | Age: 83
End: 2018-03-13
Attending: INTERNAL MEDICINE
Payer: MEDICARE

## 2018-03-13 DIAGNOSIS — R41.841 COGNITIVE COMMUNICATION DEFICIT: ICD-10-CM

## 2018-03-13 DIAGNOSIS — M54.2 NECK PAIN: ICD-10-CM

## 2018-03-13 DIAGNOSIS — R47.1 DYSARTHRIA: ICD-10-CM

## 2018-03-13 DIAGNOSIS — F43.21 ADJUSTMENT DISORDER WITH DEPRESSED MOOD: Primary | ICD-10-CM

## 2018-03-13 DIAGNOSIS — R29.898 UPPER EXTREMITY WEAKNESS: ICD-10-CM

## 2018-03-13 PROCEDURE — G9169 MEMORY GOAL STATUS: HCPCS | Mod: CI,PO

## 2018-03-13 PROCEDURE — 97110 THERAPEUTIC EXERCISES: CPT | Mod: KX,PO

## 2018-03-13 PROCEDURE — 97140 MANUAL THERAPY 1/> REGIONS: CPT | Mod: KX,PO

## 2018-03-13 PROCEDURE — 97127 HC THERAPEUTIC INTVTN, COGN FUNCTION - ST: CPT | Mod: PO

## 2018-03-13 PROCEDURE — G9170 MEMORY D/C STATUS: HCPCS | Mod: CI,PO

## 2018-03-13 PROCEDURE — 99999 PR PBB SHADOW E&M-EST. PATIENT-LVL II: CPT | Mod: PBBFAC,,, | Performed by: INTERNAL MEDICINE

## 2018-03-13 PROCEDURE — 99212 OFFICE O/P EST SF 10 MIN: CPT | Mod: PBBFAC | Performed by: INTERNAL MEDICINE

## 2018-03-13 PROCEDURE — 99213 OFFICE O/P EST LOW 20 MIN: CPT | Mod: S$PBB,,, | Performed by: INTERNAL MEDICINE

## 2018-03-13 PROCEDURE — G9168 MEMORY CURRENT STATUS: HCPCS | Mod: CI,PO

## 2018-03-13 NOTE — PROGRESS NOTES
"Name: Selma Alonzo Lux MD  Clinic Number: [id number]  Date of Treatment: 03/13/2018   Diagnosis:   Encounter Diagnoses   Name Primary?    Neck pain     Upper extremity weakness        Physician: Dee Thomson, *    Time in: 2:00   Time Out: 2: 55 PM  Total Treatment Time: 55 min   Last G-code: CK  Last PN: NA  Date of eval:12/01/2017  Visit #: 2/12  Auth expiration: 04/05/2018  POC expiration: 06/02/2018    Precautions: Standard     Subjective     Selma reports her symptoms are overall improving since beginning therapy services. Pt continues to c/o "gelatinus vessel" sensation in anterolateral neck. Patient reports their pain to be 4/10 on a 0-10 scale with 0 being no pain and 10 being the worst pain imaginable.    Objective     Selma received therapeutic exercises to develop strength, endurance, ROM and posture for 30 minutes including:   Chin tucks 20 x 5" hold   AAROM cervical rotation in supine x 10 each   Seated SCOM stretch 3 x 20"   Standing scap retraction YTB x 20    PROM - cervical rotation and sidebending   Brueggers x 15  Standing Shoulder extension x 20 YTB      Selma received the following manual therapy techniques: Soft tissue Mobilization were applied to the: cervical spine for 15 minutes.   Gentle SCOM longitudinal stroking and strumming proximal 1/3 of SCOM  Cervical distraction     MHP applied to cervical spine x 8 minutes to promote pain control and relaxation. Pt skin intact following removal of MHP.     Written Home Exercises Provided: No, pt instructed to continue with previously issued HEP    Pt educated on new therex and purpose. Pt demo good understanding of the education provided. Selma demonstrated good return demonstration of activities.     Assessment   Pt with improved tolerance for treatment session today. Pt demonstrated considerable reduction in tone and restriction in L SCOM. Pt required cueing throughout therex for appropriate form and tracking. Will continue to " monitor and progress as tolerated.  Pt will continue to benefit from skilled PT intervention. Medical Necessity is demonstrated by:  Pain limits function of effected part for some activities, Unable to participate fully in daily activities and Weakness.    Patient is making good progress towards established goals.    New/Revised goals: none at this time      Plan   Continue with established Plan of Care towards PT goals.     Laura Michael, PT

## 2018-03-13 NOTE — PROGRESS NOTES
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  Patient was last seen in clinic on 2/7/18, at which time she was continued on Zoloft 25mg daily which was started by her Neurologist, Dr. Rubio.  She is also taking Valium 2mg qHS for chorea.  She has not yet started Namenda.  Patient presents today accompanied by her daughter.    History as told by patient and daughter:  Patient and daughter feel patient is doing well.  Patient says that since last appointment she has stopped taking the Zoloft.  Does not feel she needs it and does not want to be on medications she does not need.  Daughter agrees that patient is doing well.  Patient does describe doing "disinterested" more than she was several years ago.  Does not care as much about going out or socializing.  Also feels she does not have people to do these things with as much as she used to.  Daughter feels patient has come extremely far in the last few months and is back to her baseline in regards to mood.  Daughter agrees she is not as social as she used to be.  Patient denies symptoms of depression, including depressed mood, anhedonia, insomnia/hypersomnia, worthlessness, hopelessness, low energy, difficulty concentrating, changes in appetite/weight, or psychomotor agitation/retardation.  She also denies symptoms of anxiety, including excessive worrying, panic attacks, or obsessions/compulsions.    Medication side effects:  N/A  Medication compliance:  N/A    PSYCHIATRIC REVIEW OF SYSTEMS:  Trouble with sleep:  Denies  Appetite changes:  Denies  Weight changes:  Denies  Lack of energy:  Denies  Anhedonia:  Denies  Somatic symptoms:  Denies  Libido:  " Denies  Anxiety/panic:  Denies  Guilty/hopeless:  Denies  Self-injurious behavior/risky behavior:  Denies  Any drugs:  Denies  Alcohol:  Denies    MEDICAL REVIEW OF SYSTEMS:  Complete review of systems performed covering Constitutional, Musculoskeletal, Neurologic.  All systems negative except for some mild memory loss - both patient and daughter feel this has improved over the last few months.      PAST PSYCHIATRIC, MEDICAL, AND SOCIAL HISTORY REVIEWED  The patient's past medical, family and social history have been reviewed and updated as appropriate within the electronic medical record - see encounter notes.    MEDICATIONS:    Current Outpatient Prescriptions:     adalimumab (HUMIRA PEN) PnKt injection, Inject 0.8 mLs (40 mg total) into the skin every 14 (fourteen) days., Disp: 4 each, Rfl: 6    amlodipine (NORVASC) 5 MG tablet, TAKE 1 TABLET DAILY, Disp: 90 tablet, Rfl: 2    baclofen (LIORESAL) 10 MG tablet, Take 1 tablet (10 mg total) by mouth 3 (three) times daily., Disp: 270 tablet, Rfl: 2    clopidogrel (PLAVIX) 75 mg tablet, Take 1 tablet (75 mg total) by mouth once daily., Disp: 90 tablet, Rfl: 1    diazePAM (VALIUM) 2 MG tablet, Take 1 tablet (2 mg total) by mouth every evening., Disp: 30 tablet, Rfl: 3    ferrous sulfate 325 (65 FE) MG EC tablet, Take 325 mg by mouth once daily. , Disp: , Rfl:     gabapentin (NEURONTIN) 300 MG capsule, Take 1-2 capsules (300-600 mg total) by mouth 3 (three) times daily., Disp: 540 capsule, Rfl: 1    hydrocodone-acetaminophen 5-325mg (NORCO) 5-325 mg per tablet, Take 1 tablet by mouth every 24 hours as needed for Pain., Disp: 30 tablet, Rfl: 0    hydroxychloroquine (PLAQUENIL) 200 mg tablet, TAKE 2 TABLETS ONCE DAILY, Disp: 180 tablet, Rfl: 1    linaclotide (LINZESS) 145 mcg Cap capsule, Take 1 capsule (145 mcg total) by mouth once daily., Disp: 30 capsule, Rfl: 0    montelukast (SINGULAIR) 10 mg tablet, TAKE 1 TABLET EVERY EVENING, Disp: 90 tablet, Rfl: 2     "omega 3-dha-epa-fish oil 250-350-1,000 mg Cap, Take 1 capsule by mouth 2 (two) times daily., Disp: 180 capsule, Rfl: 3    omeprazole (PRILOSEC) 20 MG capsule, Take 20 mg by mouth once daily. , Disp: , Rfl:     predniSONE (DELTASONE) 5 MG tablet, TAKE 2 TABLETS ONCE DAILY (Patient taking differently: TAKE 1 TABLETS ONCE DAILY), Disp: 180 tablet, Rfl: 0    thiamine 100 MG tablet, Take 1 tablet (100 mg total) by mouth once daily., Disp: , Rfl:     ALLERGIES:  Review of patient's allergies indicates:  No Known Allergies    PSYCHIATRIC EXAM:  There were no vitals filed for this visit.  Appearance:  Well groomed, appearing healthy and of stated age  Behavior:  Cooperative, pleasant, no psychomotor agitation or retardation  Speech:  Normal rate, rhythm, prosody, and volume  Mood:  "Fine"  Affect:  Congruent, smiling  Thought Process:  Linear, logical, goal directed  Thought Content:  Negative for suicidal ideation, homicidal ideation, delusions or hallucinations.  Associations:  Intact  Memory:  Grossly Intact  Level of Consciousness/Orientation:  Grossly intact  Fund of Knowledge:  Good  Attention:  Good  Language:  Fluent, able to name abstract and concrete objects  Insight:  Good  Judgment:  Intact  Psychomotor signs:  No involuntary movements or tremor  Gait:  Normal    RELEVANT LABS/STUDIES:    Lab Results   Component Value Date    WBC 7.71 02/28/2018    HGB 10.2 (L) 02/28/2018    HCT 33.2 (L) 02/28/2018    MCV 80 (L) 02/28/2018     02/28/2018     BMP  Lab Results   Component Value Date     02/27/2018    K 4.6 02/27/2018     02/27/2018    CO2 28 02/27/2018    BUN 16 02/27/2018    CREATININE 0.9 02/27/2018    CALCIUM 9.3 02/27/2018    ANIONGAP 7 (L) 02/27/2018    ESTGFRAFRICA >60.0 02/27/2018    EGFRNONAA >60.0 02/27/2018     Lab Results   Component Value Date    ALT 11 02/27/2018    AST 13 02/27/2018    ALKPHOS 68 02/27/2018    BILITOT 0.5 02/27/2018     Lab Results   Component Value Date    TSH " 0.775 08/24/2017     Lab Results   Component Value Date    HGBA1C 5.5 08/24/2017     IMPRESSION:    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    Status/Progress:  Based on the examination today, the patient's problem(s) is/are improved.  New problems have not been presented today.    Risk Parameters:  Patient reports no suicidal ideation  Patient reports no homicidal ideation  Patient reports no self-injurious behavior  Patient reports no violent behavior    DIAGNOSES:    ICD-10-CM ICD-9-CM   1. Adjustment disorder with depressed mood F43.21 309.0     PLAN:  Patient no longer taking Zoloft.  Per patient and daughter, mood has significantly improved even off of medication.  Patient declining medication for her mood at this time, however she is aware that she can return to see me at any time if she changes her mind.  She has appointment scheduled with an outside psychologist for therapy.      RETURN TO CLINIC:  Follow-up if symptoms worsen or fail to improve.

## 2018-03-13 NOTE — PROGRESS NOTES
OUTPATIENT NEUROLOGICAL REHABILITATION  SPEECH THERAPY DISCHARGE SUMMARY    Date:  3/13/2018     Start Time:  1515  Stop Time:  1600    Subjective/History  Onset Date:  Approximately 1 to 2 years ago  Primary Diagnosis:  Cognitive Impairment, Hemichorea, Debility  Treatment Diagnosis:    1. Cognitive communication deficit      2. Dysarthria         Referring Provider:  Dr. Baldomero Giang  Orders:  for evaluation and treat    Current Medical History:   Jose J presents to the [facility] with moderate cognitive-communicative impairment secondary to the diagnosis of cognitive impairment, hemichorea, and debility.  Precautions:  Memory loss    TIMED  Procedure Min.    Cognitive Therapy  45         Total Minutes: 45  Total Timed Units: 3  Total Untimed Units: 0  Charges Billed/# of units: 3    Visit #: 18  Total # Of Visits: 18 Cancelled: 0  No Shows: 0  Date of Evaluation:  12/19/17  Plan of Care Expiration:    02/16/18  Extended POC:    G-CODE   18/10     Progress/Current Status  Subjective:   Pain: 0/10  Pt was pleasant and motivated for therapy. She has not been seen since February 20, 2018. Re-assessment was completed today.    Objective:     Short Term Goals: (6 weeks) Current Progress:   1a. Dr. Lux will complete short term memory tasks (immediate) with 90% acc given IND'ly  -Pt answered open-ended comprehension questions immediately after hearing a short passage read to her by the clinician with 100% acc. The passage was repeated 1x per pt request.    Overall: 96%   GOAL MET    2a. Dr. Lux will complete short term memory tasks (delayed) with 90% acc given min cues.   - After a 10 min delay, pt answered open-ended comprehension questions about a short passage read to her by the clinician with 100% acc.       MET X2    Overall: 93%  GOAL MET    3a. Dr. Lux will complete mental flexibility/manipulation tasks with 90% acc IND'ly. -Pt unscrambled groups of words  to create correct sentences. 4 words: 100%, 5 words:   Overall: 88%  GOAL INCONSISTENTLY MET    4a.  Dr. Lux will complex problem solving, reasoning and thought organization tasks with with 90% acc IND'ly - Complex reasoning/direction following task: 100% acc ind'ly MET X1         Overall: 96%  GOAL MET    5a. Revised 2/6/18 - Duplicate goal of STG 5 previously: Dr. Lux will list 15 to 20 items in an abstract categories.  Abstract:  Things that are big -9   Things that are small- 9   Things that are slow - 3   Average: 7   Overall: 9 items   GOAL NOT MET      Cognitive Linguistic Quick Test (CLQT), Aphasia Administration was administered to quickly re-assess the patient's overall cognitive-linguistic function and to determine cognitive strengths and weaknesses.    Initial Discharge    Cognitive Domain Score Severity Rating Cognitive Domain Score Severity Rating   Attention 130/215 Mild 163/215 WNL   Memory 137/185 Mild 175/185 WNL   Executive Functions 11/40 Moderate 19/40 WNL   Language 29/37 WNL 32/37 WNL   Visuospatial Skills 49/105 Mild 70/105 WNL   Clock Drawing 5/13 Mild 8/13 WNL          Composite Severity Rating 3/4 Severe 4/4 Moderate          Non-linguistic Cognition 20 / 49 severe 31 / 49 severe   Linguistic / Aphasia 47 / 49 mild 51 / 49 mild     Description: Dr. Lux has shown improvements when comparing initial and discharge scores on the CLQT-A. She performed about the same on some of the tasks.     Goals Met:  1. Dr. Lux will complete short term memory tasks (immediate) with 90% acc given min cues over 3 consecutive sessions. Goal met/revise 1/23/18  2. Dr. Lux will complete short term memory tasks (delayed) with 90% acc given min cues over 3 consecutive sessions. Goal revised 1/23/18  3. Dr. Lux will complete mental flexibility/manipulation tasks with 90% acc given min cues over 3 consecutive sessions. Goal met/revise 1/23/18  4. Dr. Lux will problem solving, reasoning and thought  organization tasks with with 90% acc given min cues over 3 consecutive sessions. Goal met/revise 1/23/18  5. Dr. Lux will list 15 to 20 items in concrete categories within one minute over 3 consecutive sessions. Goal Not Met / Revise  6. Dr. Lux will utilize motor speech strategies (loud, over articulated speech, good breath support) in speech tasks with 90% accuracy given min cues over 3 consecutive sessions.Goal Met/ Discontinue  Assessment:   Dr. Lux presents with mildly impaired cognitive-communicative skills. She presents with mildly impaired short term memory, word finding, problem solving, thought organization, executive function, and mental flexibility skills. Pt has met many of her goals and has made significant progress. Improvements were noted on the CLQT-A.  However, pt reports that she still has some difficulties with her memory as well as her executive functioning skills but she does not use compensatory strategies or external aids consistently by patient choice. Pt will discharge from outpatient speech therapy at this time.    Functional Communication Measure (FCM):   Severity Modifier for Medicare G-Code:  Memory  UPDATED 03/13/18  Current status: FCM:  Level 6   - CI at least 1% < 20% impaired, limited or restricted  Projected status:  FCM: Level 6   - CI at least 1% < 20% impaired, limited or restricted  Discharge status:  FCM: Level 6   - CI at least 1% < 20% impaired, limited or restricted     Other SLP Functional Limitation (Problem Solving) UPDATED 03/13/18  Current status: FCM: Level 6   - CI at least 1% < 20% impaired, limited or restricted  Projected status:  FCM:  Level 6   - CI at least 1% < 20% impaired, limited or restricted  Discharge status:  FCM:  Level 6   - CI at least 1% < 20% impaired, limited or restricted     Patient Education/Response:   Pt was educated on strategies and external aids that she can use to support her memory. She verbalized  understanding of all discussed.     Home Program: Use memory strategies and external aids at home.    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    Other Recommendations: None at this time      LENORA Gonzalez., CCC-SLP  Speech-Language Pathologist  3/13/2018

## 2018-03-13 NOTE — Clinical Note
Please see the attached speech therapy discharge summary. Dr. Lux was educated on use of compensatory strategies to help with her memory impairment.

## 2018-03-14 ENCOUNTER — CLINICAL SUPPORT (OUTPATIENT)
Dept: REHABILITATION | Facility: HOSPITAL | Age: 83
End: 2018-03-14
Attending: INTERNAL MEDICINE
Payer: MEDICARE

## 2018-03-14 ENCOUNTER — OFFICE VISIT (OUTPATIENT)
Dept: NEUROLOGY | Facility: CLINIC | Age: 83
End: 2018-03-14
Payer: MEDICARE

## 2018-03-14 VITALS
WEIGHT: 157.88 LBS | BODY MASS INDEX: 26.3 KG/M2 | HEIGHT: 65 IN | HEART RATE: 88 BPM | SYSTOLIC BLOOD PRESSURE: 126 MMHG | DIASTOLIC BLOOD PRESSURE: 68 MMHG

## 2018-03-14 DIAGNOSIS — R29.898 WEAKNESS OF BOTH HANDS: ICD-10-CM

## 2018-03-14 DIAGNOSIS — G25.5 HEMICHOREA: ICD-10-CM

## 2018-03-14 DIAGNOSIS — I63.9 CEREBROVASCULAR ACCIDENT (CVA), UNSPECIFIED MECHANISM: Primary | ICD-10-CM

## 2018-03-14 DIAGNOSIS — F01.50 VASCULAR DEMENTIA WITHOUT BEHAVIORAL DISTURBANCE: ICD-10-CM

## 2018-03-14 DIAGNOSIS — F39 MOOD DISORDER: ICD-10-CM

## 2018-03-14 DIAGNOSIS — M79.642 BILATERAL HAND PAIN: ICD-10-CM

## 2018-03-14 DIAGNOSIS — G24.3 CERVICAL DYSTONIA: ICD-10-CM

## 2018-03-14 DIAGNOSIS — M79.641 BILATERAL HAND PAIN: ICD-10-CM

## 2018-03-14 PROCEDURE — G8987 SELF CARE CURRENT STATUS: HCPCS | Mod: CL,PO

## 2018-03-14 PROCEDURE — 99999 PR PBB SHADOW E&M-EST. PATIENT-LVL IV: CPT | Mod: PBBFAC,GC,, | Performed by: STUDENT IN AN ORGANIZED HEALTH CARE EDUCATION/TRAINING PROGRAM

## 2018-03-14 PROCEDURE — 99214 OFFICE O/P EST MOD 30 MIN: CPT | Mod: S$PBB,GC,, | Performed by: STUDENT IN AN ORGANIZED HEALTH CARE EDUCATION/TRAINING PROGRAM

## 2018-03-14 PROCEDURE — G8988 SELF CARE GOAL STATUS: HCPCS | Mod: CK,PO

## 2018-03-14 PROCEDURE — 97166 OT EVAL MOD COMPLEX 45 MIN: CPT | Mod: PO

## 2018-03-14 PROCEDURE — 99214 OFFICE O/P EST MOD 30 MIN: CPT | Mod: PBBFAC | Performed by: STUDENT IN AN ORGANIZED HEALTH CARE EDUCATION/TRAINING PROGRAM

## 2018-03-14 NOTE — PROGRESS NOTES
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  Since last visit, she has been doing well.  Reports that her chorea has improved.  Continues to take valium at night to help with sleep and feels that it is helping her.  Overall, she describes a pattern of feeling like she has nothing to do at home.  She subsequently reports several activities in which she would like to work (e.g. Improve her handwriting), we discussed setting goals for these activities and would check in on them at her next visit.      Regarding initiation of namenda: daughter and pt are not sure if it was ever delivered, but pt is about to start humira for RA, and would prefer to NOT start any other medications at this point.      Trialed botox for cervical dystonia with another provider, but does not feel she has had appropriate relief at this point from it.     Prior histories  Molly Lux was initially seen by me on 8/23, with recent follow up with Dr. 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  At her last visit with Dr. Giang, she was started on valium 2 mg BID, which she states she has taken very inconsistently, if at all.  She reports no significant improvement in chorea to date.  Prior authorization for tetrabenazine was attempted, but unsuccessful.  As a result, we attempted to have her started on austedo.  Her austedo was delivered for her to begin taking, but the next day upon reviewing the medication, she called the clinic in significant distress that she was feeling overwhelmed and that it was too much for her.  " She was asking for inpt rehab/nursing home placement at that time.  I was concerned that this may be indicative of a mood disorder, and that she may potentially be dangerous to herself (notably she denied any ideation/plan for SIHI at that time).  I advised her to call 911 and present to the ED.  On follow up call later that day, her mood was significantly improved and she declined to come to the ED.  I had another extended phone call with both the pt and her daughter, where I recommended that she NOT take austedo for concerns of her mood disorder, recommended she f/u with psychology and referral to psychiatry, and return to clinic for further management.     She presents today in significantly better spirits than she was while on the phone call described above.  She was recently seen by GI and then in the ED in the 2 days preceding her clinic visit today for abdominal pain (discharged home, advised to continue taking Linzess).       With regards to her hemichorea, her symptoms continue to persist, stable from prior.  She reports that she last took valium 1-2 weeks ago.  She initially states that she couldn't tell if it made a difference, however upon further questioning she states that it may have helped, and she would like to continue for now.     She also reports she has been participating in physical therapy.  She has f/u with outpt psychiatrist, Dr. Corea, with whom she has had two visits. Dr. Lux reports she is pleased with Dr. Corea, and believes these sessions are helpful.     She reports her mood is "quite good," denies any SIHI at this time.     Review of Systems:  Constitutional: no fever or chills  Eyes: no visual changes  ENT: no nasal congestion or sore throat  Respiratory: no cough or shortness of breath  Cardiovascular: no chest pain or palpitations  Gastrointestinal: no nausea or vomiting, no abdominal pain or change in bowel habits, positive for constipation  Genitourinary: no hematuria or " "dysuria  Integument/Breast: no rash or pruritis  Neurological: positive for hemichorea  Behavioral/Psych: no auditory or visual hallucinations      Physical Exam    Vitals:    03/14/18 1442   BP: 126/68   BP Location: Right arm   Patient Position: Sitting   BP Method: Large (Automatic)   Pulse: 88   Weight: 71.6 kg (157 lb 13.6 oz)   Height: 5' 5" (1.651 m)        General: Well-developed, well-groomed. No apparent distress  Cardiovascular: Regular rate and rhythm with no murmurs, rubs or gallops.  Chest: Lungs clear to auscultation bilaterally.  No wheezes, stridor, ronchi appreciated.  Abdomen: Normoactive bowel sounds present.  Soft, nontender to palpation.  Extremities: No peripheral edema     Neurologic Exam: The patient is awake, alert and oriented. Language is fluent.  Fund of knowledge is appropriate.      Cranial nerves:   Pupils are round and reactive to light and accommodation.  Visual fields are full to confrontation.    Ocular motility is full in all cardinal positions of gaze.   Facial sensation is normal to light touch.   Facial activation is symmetric.   Hearing is symmetric bilaterally.   Palate elevates symmetrically.   Shoulder elevation is symmetric and full strength bilaterally.   Tongue is midline and neck rotation strength is normal bilaterally.      Motor examination L-sided hemichorea, occasional blepharospasm of L eye.  Strength is 4+/5 in the upper and lower extremities bilaterally.  Cervical dystonia.     Sensory examination is normal light touch in BUE and BLE.     Deep tendon reflexes are 2+ and symmetric in the upper and lower extremities bilaterally.  No clonus, equivocal toes b/l.     Gait: Unstable casual gait, ambulates with cane.     Coordination: Finger to nose intact b/l.  Chorea prominent on LLE with heel/shin, overall improved compared to prior.           Lab and Test Results           WBC   Date Value Ref Range Status   12/01/2017 8.97 3.90 - 12.70 K/uL Final   11/07/2017 7.72 " 3.90 - 12.70 K/uL Final   09/07/2017 11.02 3.90 - 12.70 K/uL Final            Hemoglobin   Date Value Ref Range Status   12/01/2017 11.5 (L) 12.0 - 16.0 g/dL Final   11/07/2017 11.4 (L) 12.0 - 16.0 g/dL Final   09/07/2017 12.6 12.0 - 16.0 g/dL Final            Hematocrit   Date Value Ref Range Status   12/01/2017 37.2 37.0 - 48.5 % Final   11/07/2017 36.3 (L) 37.0 - 48.5 % Final   09/07/2017 40.5 37.0 - 48.5 % Final            Platelets   Date Value Ref Range Status   12/01/2017 225 150 - 350 K/uL Final   11/07/2017 253 150 - 350 K/uL Final   09/07/2017 278 150 - 350 K/uL Final            Glucose   Date Value Ref Range Status   12/01/2017 109 70 - 110 mg/dL Final   11/07/2017 99 70 - 110 mg/dL Final   09/07/2017 106 70 - 110 mg/dL Final            Sodium   Date Value Ref Range Status   12/01/2017 141 136 - 145 mmol/L Final   11/07/2017 140 136 - 145 mmol/L Final   09/07/2017 142 136 - 145 mmol/L Final            Potassium   Date Value Ref Range Status   12/01/2017 3.5 3.5 - 5.1 mmol/L Final   11/07/2017 3.5 3.5 - 5.1 mmol/L Final   09/07/2017 3.6 3.5 - 5.1 mmol/L Final            Chloride   Date Value Ref Range Status   12/01/2017 103 95 - 110 mmol/L Final   11/07/2017 104 95 - 110 mmol/L Final   09/07/2017 105 95 - 110 mmol/L Final            CO2   Date Value Ref Range Status   12/01/2017 28 23 - 29 mmol/L Final   11/07/2017 30 (H) 23 - 29 mmol/L Final   09/07/2017 25 23 - 29 mmol/L Final            BUN, Bld   Date Value Ref Range Status   12/01/2017 10 8 - 23 mg/dL Final   11/07/2017 8 8 - 23 mg/dL Final   09/07/2017 12 8 - 23 mg/dL Final            Creatinine   Date Value Ref Range Status   12/01/2017 0.9 0.5 - 1.4 mg/dL Final   11/07/2017 0.7 0.5 - 1.4 mg/dL Final   09/07/2017 0.9 0.5 - 1.4 mg/dL Final            Calcium   Date Value Ref Range Status   12/01/2017 9.5 8.7 - 10.5 mg/dL Final   11/07/2017 9.2 8.7 - 10.5 mg/dL Final   09/07/2017 9.7 8.7 - 10.5 mg/dL Final            Magnesium   Date Value Ref Range  Status   12/01/2017 1.9 1.6 - 2.6 mg/dL Final   02/04/2015 1.9 1.6 - 2.6 mg/dL Final   05/02/2011 2.1 1.6 - 2.6 mg/dl Final            Phosphorus   Date Value Ref Range Status   05/02/2011 3.1 2.7 - 4.5 mg/dl Final            Alkaline Phosphatase   Date Value Ref Range Status   12/01/2017 63 55 - 135 U/L Final   11/07/2017 63 55 - 135 U/L Final   09/07/2017 66 55 - 135 U/L Final            ALT   Date Value Ref Range Status   12/01/2017 12 10 - 44 U/L Final   11/07/2017 10 10 - 44 U/L Final   09/07/2017 9 (L) 10 - 44 U/L Final            AST   Date Value Ref Range Status   12/01/2017 14 10 - 40 U/L Final   11/07/2017 11 10 - 40 U/L Final   09/07/2017 15 10 - 40 U/L Final            Images:  No new perinent imaging    Assessment and Plan    Problem List Items Addressed This Visit        Neuro    Hemichorea    Current Assessment & Plan     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     PMH of HTN, Hashimoto's as above, is currently stable.      Recommendations & Plan:  -Improving  -valium 2 mg QHS  -Will NOT start namenda per pt preference.  Will NOT start austedo for concerns of mood disorder and anti-dopaminergic effects  -F/u with psychology and psychiatry   -Continue outpatient therapies (ST referral)           Vascular dementia    Current Assessment & Plan     R caudate lacunar infarction as above.    -Pt would like to have f/u appt scheduled with Dr. Tobias, will send message to Dr. Tobias.         Cerebrovascular accident (CVA) - Primary    Relevant Orders    Ambulatory Referral to Speech Therapy    Cervical dystonia    Current Assessment & Plan     Cervical dystonia causing significant degree of pain and discomfort.    Plan  -Arrange for EMG-guided botox with Dr. Giang  -prior auth for 200 U botox  -Topical cream to fax to [place] [facility]              Psychiatric     Mood disorder    Current Assessment & Plan     Likely playing a significant role in memory loss and quality of life.    -Discussed setting activity goals and will review at next appointment (working on handwriting)  -Management per psychiatry and psychology                   Branden Rubio MD  Neurology Resident   [facility]  [address] [place] [address]

## 2018-03-14 NOTE — PATIENT INSTRUCTIONS
-Valium 2 mg at night only  -DO NOT take austedo, nor namenda  -Follow up with psychology and psychiatry  -Physical/speech therapies  -Discuss cervical dystonia with Dr. Giang   -Cream for neck pain  -Return to clinic in 3 months

## 2018-03-14 NOTE — PLAN OF CARE
"  Occupational Therapy Evaluation - Hand, Wrist, and/or Elbow Condition     Date: 3/14/2018  Name: Selma Lux MD  YOB: 1935  Chart Number: [id number]  Referring Physician: Jacoby Prakash*  Diagnosis:   1. Bilateral hand pain     2. Weakness of both hands       ICD 10:   M05.79 (ICD-10-CM) - Seropositive rheumatoid arthritis of multiple sites   M19.041,M19.042 (ICD-10-CM) - Primary osteoarthritis of both hands       Involved Side: Bilateral   Hand Dominance: Right hand  Date of Onset:   Date of Injury/Surgery: none  Surgical Procedure: NA  Mechanism of Injury: NA  Additional Info: previous stroke one year + involuntary muscle tremor  Date of Return to MD: 3/27/18  Past Medical History/Comorbidities:   Past Medical History:   Diagnosis Date    Anemia     Arthritis     Hashimoto's disease     Hypertension     IGT (impaired glucose tolerance) 2016    Joint pain     Multinodular goiter 2016    Stroke        Prior Functional Status: pt has a daily home aide and daughter that assists with care part itme  Occupation: Retired   Employer: NA  Job Duties/Responsibilities: NA  Current Work Status: retired   Home Environment: Pt lives independently and has five steps.   Leisure/Social Activities: walking in park   Barriers to Learning: Pt was a poor historian and presented with difficulty with simple verbal instructions  Hearing/Vision Loss: No notable deficits with hearing or vision    Visit #: 20. Visits  on .    Subjective   "I have quite a bit of pain in the left hand"  Pain Report (severity and location)  Current: 3 out of 10  With activity: 6 out of 10   Report of Functional Deficits/Chief Complaints: Fastening seatbelt due to thumb CMC pain    Objective   Observation: Gurvinder notes, thenar atrophie    Edema. Measured in centimeters. No notable edema present    Edema. Measured in centimeters. Finger edema appears bilaterally symmetrical    Wrist ROM. Measured in " degrees.  Date 3/14/2018 3/14/2018      Left Right     Supination/Pronation 90/65 75/90     Wrist ext/flex 35/45 40/75     Wrist RD/UD 20/30 10/30         Hand ROM. Pts hand AROM was grossly intact. Mild thumb deficits noted in left. Dyskinesia noted B/L ROM difficulty to assesses due to patient difficulty with instructions.        Strength (Dyanmometer) and Pinch Strength (Pinch Gauge)  Measured in pounds.  Date 3/14/2018 3/14/2018          Left Right     Rung II 10 17     Key Pinch 3 5     3pt Pinch 2 4     2pt Pinch 3 4         Sensation Pt denies paresthesias       Manual Muscle Test  Date 3/14/2018 3/14/2018      Left Right     Wrist Extension  -4/5 4+/5     Wrist Flexion -4/5 4+/5     Radial Deviation -4/5 4+/5     Ulnar Deviation -4/5 4+/5     Supination -4/5 4+/5     Pronation -4/5 4+/5     Elbow Extension       Elbow Flexion           Special Tests  Thumb CMC Grind Test Positive B/L   Finkelstein's Test NT   Phalen's Test NT   Tinel's Test NT   Alek's Test    Extrinsic Tightness Test NEG   Intrinsic Tightness Test    ORL Test    Froment's Sign    Grecia's Sign     Egawa Sign  NEg   Clamp Sign     Scaphoid Thrust Test NEG   Linscheid's Test    Metacarpal Stress Test    Piano Key Test    ECU Synergy Test    Ulnar Compression Test    TFCC Load Test NEG   Ulnocarpal Stress Test    Midcarpal Shift Test    Pisiform Boost Test    Tennis Elbow Test NEG   Resisted Middle Finger Extension Test    Chair Test    Biceps Squeeze Test    Biceps Hook Test        Functional Limitation Reporting   Tool: FOTO  Category: Self care  Visit DATE SCORE Current  G-Code Goal  G-Code   Intake 3/14/2018 68/100 CL 58/100 CK   5TH       10TH       Discharge            Treatment     Issued and reviewed the above treatment to be completed by patient as HEP 1-3x/day.   Theraputty strengthening: intrinsic scissoring  Finger abduction  Mass grasp x 2 min  Gentle PROM   Thumb opposition with cueing for web space and body mechanics  Use  of ice and heat for pain management     Charges   Start Time: 1:20  End Time: 2:00  Total Time: Self care  Initial eval-35815 moderate   Fluidotherapy-25963    Paraffin-16153    Moist Hot Pack-53057    Ultrasound-03763    Therapeutic Exercise-95814    Therapeutic Activity-26143    Manual Therapy-60483        Assessment   Patient is a 82 y.o. year old female with a diagnosis of Ra. The patient was late to evaluation and had difficulty completing self report survey. Time limitation during evaluation was exacerbated by pts difficulty with simple instruction. Pt required hand over over hand cueing, demonstration and verbal explanation for evaluation. Dyskinesia noted throughout left Ue. Pt has 2' shoulder disability on the left. Screening indicated minimal ER strength possible infraspinatus tear. Pt provided with HEP and theraputty. HEP will be reinforced next session. Limitations affecting independence with the patient's normal, daily routine include self care and grooming. The patient would benefit form therapy to address disuse of left UE and pain 2' Ra.       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     Selma Lux MD has difficulty with  grooming  medication management  affecting his/her daily functional abilities. His/her main goal for therapy is to have more strength and less pain.     Comorbidities:   advanced age, anxiety, coping style/mechanism and transportation assistance required    Medical and Therapy History Review:   Expanded               Performance Deficits    Physical:  Joint Mobility  Joint Stability  Muscle Power/Strength  Control of Voluntary Movement   Strength  Pinch Strength  Proprioception Functions  Tactile Functions  Pain    Cognitive:  No Deficits    Psychosocial:    Social  Interaction  Routines  Rituals  Family Support     Clinical Decision Making:  Low    Assessment Process:  Problem-Focused Assessments    Body Structures:  Related to movement    Body Functions:  Musculoskeletal Functions  Movement Functions  Skin Functions  Sensory Functions  Neuromuscular Functions  Cardiovascular Functions  Mental Functions  Voice/Speech Functions    Modification/Need for Assistance:  Hand over hand assist + verbal and visual demonstration needed    Intervention Selection:  Several,    moderate  Based on PMHX, co morbidities , data from assessments and functional level of assistance required with task and clinical presentation directly impacting function.       Goals       Goals to be met in 4 weeks: (4/14/18)  1) Initiate Hep   2) Pt to increase AROM of wrist with ext/flx by 5 degrees by 4 weeks.  3) Pt to increase / pinch strength by 5 pounds by 4 weeks.  4) Pt to decrease pain to less than or equal to 3/10 by 4 weeks.   5) Patient will be able to achieve less than or equal to 30% on the FOTO, demonstrating overall improved functional ability with upper extremity.      Goals to be met by discharge:  1) Independent with HEP  2) Pt to increase AROM of wrist to WNL as compared to R wrist by d/c.   3) Pt to increase strength by 10 pounds or WNL as compared to RUE by d/c.   4) Pt to decrease pain to trace or none by d/c.   5) Patient will be able to achieve less than or equal to 20% on the FOTO, demonstrating overall improved functional ability with upper extremity.       Plan     Initiate skilled occupational therapy services 1-2x/week for 8-12 weeks from 3/14/2018 to 6/16/18.    Treatment interventions to include:  Heat modalities (29795 or 10749 or 35772)  Cold modalities (12034)  Pain management modalities (46395)  Scar management (38808 or 45268)  Edema control techniques (68762)  Manual therapy (22085)  Splinting (pending L code or 92184 or 89670)  Therapeutic exercises (38005)  Therapeutic  activities (08668)  ADL training (22959 or 63432 or 74371)  Work simulation/Work conditioning (80361 or 73118)  Astym and/or IASTM (02161)  Strengthening and Endurance training (60790 or 48101 or 43594 or 60479)  Kinesiotaping (86837)  HEP instruction (43831)   NMES 48563 or (73436)  Patient/Caregiver instruction (73922)    Therapist: NICHOLAS Car, OTR/L     I certify the need for these services furnished under this plan of treatment and while under my care    ____________________________________                         __________________  Physician/Referring Practitioner                                               Date of Signature

## 2018-03-14 NOTE — PATIENT INSTRUCTIONS
Finger / Thumb Activities: Extension        Roll putty into rope shape using all fingers held straight.  [place] with thumb up and out.    Copyright © I. All rights reserved.   Finger / Thumb Extensors        Place right fingers and thumb in center of putty donut, stretch out.  Repeat ____ times. Do ____ sessions per day.    Copyright © I. All rights reserved.    Strengthening (Resistive Putty)        Squeeze putty using thumb and all fingers.  Repeat ____ times. Do ____ sessions per day.    Copyright © I. All rights reserved.   Lateral Pinch Strengthening (Resistive Putty)        Squeeze between thumb and side of each finger in turn.  Repeat ____ times. Do ____ sessions per day.    Copyright © I. All rights reserved.   FINGERS: Extension (Putty)        Open hand and fingers to flatten putty. ___ reps per set, ___ sets per day, ___ days per week      Copyright © I. All rights reserved.   3/

## 2018-03-16 ENCOUNTER — TELEPHONE (OUTPATIENT)
Dept: REHABILITATION | Facility: HOSPITAL | Age: 83
End: 2018-03-16

## 2018-03-16 ENCOUNTER — TELEPHONE (OUTPATIENT)
Dept: INTERNAL MEDICINE | Facility: CLINIC | Age: 83
End: 2018-03-16

## 2018-03-16 DIAGNOSIS — R32 URINARY INCONTINENCE, UNSPECIFIED TYPE: Primary | ICD-10-CM

## 2018-03-16 RX ORDER — OMEPRAZOLE 20 MG/1
20 CAPSULE, DELAYED RELEASE ORAL DAILY
Qty: 90 CAPSULE | Refills: 3 | Status: ON HOLD | OUTPATIENT
Start: 2018-03-16 | End: 2018-05-01 | Stop reason: HOSPADM

## 2018-03-16 RX ORDER — CLOPIDOGREL BISULFATE 75 MG/1
75 TABLET ORAL DAILY
Qty: 90 TABLET | Refills: 1 | Status: ON HOLD | OUTPATIENT
Start: 2018-03-16 | End: 2018-05-01

## 2018-03-16 NOTE — TELEPHONE ENCOUNTER
Called patient - she had seen her neurologist today and the Plavix issue was not discussed. Dr. Jeffries felt plavix would be a better option for CVA prevention. Dr. Morton thought plavix would be acceptable and will change from asa to plavix. (stop ASA) He wants her on acid suppression.    Clear instructions given and she repeated to her daughter in the background.    See meds, orders, follow up, routing and instructions sections of encounter. Bleeding risk was discussed. Alert for any GIBleed, etc.  prilosec 20 mg daily.

## 2018-03-16 NOTE — TELEPHONE ENCOUNTER
----- Message from Mg Morton MD sent at 2/12/2018  1:16 PM CST -----  Hi Bhargav,  I'm ok with either as long as she is on acid suppression. We can change acid suppression too to avoid interference with Plavix if you in the platelet aggregometry crowd, no problem.  Her EGD findings were quite mild on her last exam. We will just have to watch her symptoms, she has a lot of concomitant issues. Happy to f/u with her as well  Best  Mg  ----- Message -----  From: Bhargav Lee MD  Sent: 2/10/2018   3:02 PM  To: Mg Morton MD    Patient has cerebrovasc disease and her neurologist recommended anti-platelet. Cardiologist though plavix would be better than ASA - she has a history of PUD and an abnormal EGD - what are your thoughts?    Thank you. Bhargav Lee

## 2018-03-16 NOTE — TELEPHONE ENCOUNTER
I spoke to Dr. Lux and her daughter, Susu, about her discharge from outpatient speech therapy. She has done well and was educated on compensatory memory strategies and use of external aids. She and her daughter agreed with the improvements and use of strategies. They also agree with the discharge plan of care. A note will be sent to Dr. Branden Rubio informing him of the discharge.    LENORA Gonzalez., CCC-SLP  Speech-Language Pathologist  3/16/2018

## 2018-03-19 ENCOUNTER — CLINICAL SUPPORT (OUTPATIENT)
Dept: REHABILITATION | Facility: HOSPITAL | Age: 83
End: 2018-03-19
Attending: INTERNAL MEDICINE
Payer: MEDICARE

## 2018-03-19 ENCOUNTER — TELEPHONE (OUTPATIENT)
Dept: ORTHOPEDICS | Facility: CLINIC | Age: 83
End: 2018-03-19

## 2018-03-19 DIAGNOSIS — M79.641 BILATERAL HAND PAIN: ICD-10-CM

## 2018-03-19 DIAGNOSIS — M25.512 LEFT SHOULDER PAIN, UNSPECIFIED CHRONICITY: Primary | ICD-10-CM

## 2018-03-19 DIAGNOSIS — R29.898 WEAKNESS OF BOTH HANDS: ICD-10-CM

## 2018-03-19 DIAGNOSIS — M79.642 BILATERAL HAND PAIN: ICD-10-CM

## 2018-03-19 PROBLEM — G24.3 CERVICAL DYSTONIA: Status: ACTIVE | Noted: 2018-03-19

## 2018-03-19 PROCEDURE — 97140 MANUAL THERAPY 1/> REGIONS: CPT | Mod: PO

## 2018-03-19 PROCEDURE — 97110 THERAPEUTIC EXERCISES: CPT | Mod: PO

## 2018-03-19 PROCEDURE — 97035 APP MDLTY 1+ULTRASOUND EA 15: CPT | Mod: PO

## 2018-03-19 PROCEDURE — 97018 PARAFFIN BATH THERAPY: CPT | Mod: 59,PO

## 2018-03-19 NOTE — PROGRESS NOTES
"Occupational Therapy Daily Progress Note      Date: 3/14/2018  Name: Selma Alonzo Lux MD  YOB: 1935  Chart Number: [id number]  Referring Physician: Jacoby Prakash*  Diagnosis:   1. Bilateral hand pain      2. Weakness of both hands         ICD 10:   M05.79 (ICD-10-CM) - Seropositive rheumatoid arthritis of multiple sites   M19.041,M19.042 (ICD-10-CM) - Primary osteoarthritis of both hands         Involved Side: Bilateral   Hand Dominance: Right hand  Date of Onset:   Date of Injury/Surgery: none  Surgical Procedure: NA  Mechanism of Injury: NA  Additional Info: previous stroke one year + involuntary muscle tremor  Date of Return to MD: 3/27/18  Past Medical History/Comorbidities:        Past Medical History:   Diagnosis Date    Anemia      Arthritis      Hashimoto's disease      Hypertension      IGT (impaired glucose tolerance) 2016    Joint pain      Multinodular goiter 2016    Stroke           Prior Functional Status: pt has a daily home aide and daughter that assists with care part itme  Occupation: Retired   Employer: NA  Job Duties/Responsibilities: NA  Current Work Status: retired   Home Environment: Pt lives independently and has five steps.   Leisure/Social Activities: walking in park   Barriers to Learning: Pt was a poor historian and presented with difficulty with simple verbal instructions  Hearing/Vision Loss: No notable deficits with hearing or vision     Visit #: 2 of 20. Visits  on .     Subjective   "I am kind of at a steady downward trend with my hand "  Pain Report (severity and location)  Current: 3 out of 10  With activity: 6 out of 10   Report of Functional Deficits/Chief Complaints: Fastening seatbelt due to thumb CMC pain     Objective   Observation: Gurvinder notes, thenar atrophie     Edema. Measured in centimeters. No notable edema present     Edema. Measured in centimeters. Finger edema appears bilaterally symmetrical     Wrist ROM. " Tolerated procedure without complications.   Measured in degrees.  Date 3/14/2018 3/14/2018         Left Right       Supination/Pronation 90/65 75/90       Wrist ext/flex 35/45 40/75       Wrist RD/UD 20/30 10/30             Hand ROM. Pts hand AROM was grossly intact. Mild thumb deficits noted in left. Dyskinesia noted B/L ROM difficulty to assesses due to patient difficulty with instructions.          Strength (Dyanmometer) and Pinch Strength (Pinch Gauge)  Measured in pounds.  Date 3/14/2018 3/14/2018               Left Right       Rung II 10 17       Key Pinch 3 5       3pt Pinch 2 4       2pt Pinch 3 4             Sensation Pt denies paresthesias         Manual Muscle Test  Date 3/14/2018 3/14/2018         Left Right       Wrist Extension  -4/5 4+/5       Wrist Flexion -4/5 4+/5       Radial Deviation -4/5 4+/5       Ulnar Deviation -4/5 4+/5       Supination -4/5 4+/5       Pronation -4/5 4+/5       Elbow Extension           Elbow Flexion                 Special Tests  Thumb CMC Grind Test Positive B/L   Finkelstein's Test NT   Phalen's Test NT   Tinel's Test NT   Alek's Test     Extrinsic Tightness Test NEG   Intrinsic Tightness Test     ORL Test     Froment's Sign     Grecia's Sign      Egawa Sign  NEg   Clamp Sign      Scaphoid Thrust Test NEG   Linscheid's Test     Metacarpal Stress Test     Piano Key Test     ECU Synergy Test     Ulnar Compression Test     TFCC Load Test NEG   Ulnocarpal Stress Test     Midcarpal Shift Test     Pisiform Boost Test     Tennis Elbow Test NEG   Resisted Middle Finger Extension Test     Chair Test     Biceps Squeeze Test     Biceps Hook Test           Functional Limitation Reporting   Tool: FOTO  Category: Self care  Visit DATE SCORE Current  G-Code Goal  G-Code   Intake 3/14/2018 68/100 CL 58/100 CK   5TH           10TH           Discharge                  Treatment    Patient received paraffin bath to both hand(s) for 8 minutes to increase blood flow, circulation, pain management and for tissue elasticity prior to  therex.    Patient received ultrasound to  B/L DIP joints area to increase blood flow, circulation, tissue elasticity, pain management and for wound/scar management for 12 minutes @ 3.3 Mhz, Intensity  w/cm2 at 100% duty cycle.        Issued and reviewed the above treatment to be completed by patient as HEP 1-3x/day.   Finger abduction  Mass grasp x 2 min, Compressing into pancake then depressing with high lighter x 2 min, intrinsic scissoring  Wrist extension x 20 right wrist only  Gentle PROM + joint mobs    Thumb opposition with cueing for web space and body mechanics  Use of ice and heat for pain management      Charges   Start Time: 1:00  End Time: 2:00  Total Time: Self care  Initial eval-83910    Fluidotherapy-81819     Paraffin-97018  x8    Moist Hot Pack-29162 NC    Ultrasound-97035  x12   Therapeutic Exercise-97110  x12   Therapeutic Activity-11195     Manual Therapy-97140 x12          Assessment   Patient is a 82 y.o. year old female with a diagnosis of Ra. Pt continues to have mild dyskinesia which was improved over last visit. Praxis and motor control on left UE most affected. Pt presents with continued crepitus in left Radio/ulnar joint. Left ring finger remains most painful. Pt demonstrated good kristina. Of light PROM stretching and therex. Pt fitted for edema sleeve on ring finger.         Goals         Goals to be met in 4 weeks: (4/14/18)  1) Initiate Hep   2) Pt to increase AROM of wrist with ext/flx by 5 degrees by 4 weeks.  3) Pt to increase / pinch strength by 5 pounds by 4 weeks.  4) Pt to decrease pain to less than or equal to 3/10 by 4 weeks.   5) Patient will be able to achieve less than or equal to 30% on the FOTO, demonstrating overall improved functional ability with upper extremity.      Goals to be met by discharge:  1) Independent with HEP  2) Pt to increase AROM of wrist to WNL as compared to R wrist by d/c.   3) Pt to increase strength by 10 pounds or WNL as compared to RUE by d/c.    4) Pt to decrease pain to trace or none by d/c.   5) Patient will be able to achieve less than or equal to 20% on the FOTO, demonstrating overall improved functional ability with upper extremity.         Plan      Initiate skilled occupational therapy services 1-2x/week for 8-12 weeks from 3/14/2018 to 6/16/18.     Treatment interventions to include:  Heat modalities (16201 or 63362 or 28522)  Cold modalities (10324)  Pain management modalities (35450)  Scar management (23298 or 88788)  Edema control techniques (92397)  Manual therapy (44700)  Splinting (pending L code or 95822 or 25983)  Therapeutic exercises (04684)  Therapeutic activities (58387)  ADL training (62104 or 09875 or 96370)  Work simulation/Work conditioning (87011 or 86468)  Astym and/or IASTM (80423)  Strengthening and Endurance training (62978 or 72428 or 91696 or 35643)  Kinesiotaping (00519)  HEP instruction (78394)   NMES 22025 or (79381)  Patient/Caregiver instruction (94049)     Therapist: NICHOLAS Car, OTR/L      I certify the need for these services furnished under this plan of treatment and while under my care     ____________________________________                         __________________  Physician/Referring Practitioner                                               Date of Signature

## 2018-03-19 NOTE — ASSESSMENT & PLAN NOTE
R caudate lacunar infarction as above.    -Pt would like to have f/u appt scheduled with Dr. Tobias, will send message to Dr. Tobias.

## 2018-03-19 NOTE — ASSESSMENT & PLAN NOTE
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     PMH of HTN, Hashimoto's as above, is currently stable.      Recommendations & Plan:  -Improving  -valium 2 mg QHS  -Will NOT start namenda per pt preference.  Will NOT start austedo for concerns of mood disorder and anti-dopaminergic effects  -F/u with psychology and psychiatry   -Continue outpatient therapies ([facility] referral)

## 2018-03-19 NOTE — ASSESSMENT & PLAN NOTE
Cervical dystonia causing significant degree of pain and discomfort.    Plan  -Arrange for EMG-guided botox with Dr. Giang  -prior auth for 200 U botox  -Topical cream to fax to [facility]

## 2018-03-19 NOTE — TELEPHONE ENCOUNTER
Selma Lux MD reminded of appointment on 3/27/18 with Dr. RANJITH Thacker w/time and location. Notified of need for xray before OV w/date, time, and location of appts.    Pt okay with appt time changed d/t LS late clinic start.

## 2018-03-19 NOTE — ASSESSMENT & PLAN NOTE
Likely playing a significant role in memory loss and quality of life.    -Discussed setting activity goals and will review at next appointment (working on handwriting)  -Management per psychiatry and psychology

## 2018-03-20 ENCOUNTER — CLINICAL SUPPORT (OUTPATIENT)
Dept: REHABILITATION | Facility: HOSPITAL | Age: 83
End: 2018-03-20
Attending: INTERNAL MEDICINE
Payer: MEDICARE

## 2018-03-20 DIAGNOSIS — R29.898 UPPER EXTREMITY WEAKNESS: ICD-10-CM

## 2018-03-20 DIAGNOSIS — M54.2 NECK PAIN: ICD-10-CM

## 2018-03-20 PROCEDURE — 97140 MANUAL THERAPY 1/> REGIONS: CPT | Mod: PO

## 2018-03-20 PROCEDURE — 97110 THERAPEUTIC EXERCISES: CPT | Mod: PO

## 2018-03-20 NOTE — PROGRESS NOTES
"Name: Selma Rosado MD Jose J  Clinic Number: [id number]  Date of Treatment: 03/20/2018   Diagnosis:   Encounter Diagnoses   Name Primary?    Neck pain     Upper extremity weakness        Physician: Dee Thomson, *    Time in: 2:00   Time Out: 2: 50 PM  Total Treatment Time: 50 min   Last G-code: CK  Last PN: NA  Date of eval:12/01/2017  Visit #: 5/12  Auth expiration: 04/05/2018  POC expiration: 06/02/2018    Precautions: Standard     Subjective     Selma reports her neck is still bothering her and continues to have a "hollow sensation" distal to her mastoid process. Patient reports their pain to be 5/10 on a 0-10 scale with 0 being no pain and 10 being the worst pain imaginable.    Objective     Selma received therapeutic exercises to develop strength, endurance, ROM and posture for 30 minutes including:   Chin tucks 20 x 5" hold   AAROM cervical rotation in supine x 10 each   Seated SCOM stretch 3 x 20"   Standing scap retraction YTB x 30  PROM - cervical rotation and sidebending   Brueggers x 15  Standing Shoulder extension x 30 YTB      Selma received the following manual therapy techniques: Soft tissue Mobilization were applied to the: cervical spine for 10 minutes.   Gentle SCOM longitudinal stroking and strumming proximal 1/3 of SCOM  Cervical distraction     MHP applied to cervical spine x 8 minutes to promote pain control and relaxation. Pt skin intact following removal of MHP.     Written Home Exercises Provided: No, pt instructed to continue with previously issued HEP    Pt educated on new therex and purpose. Pt demo good understanding of the education provided. Selma demonstrated good return demonstration of activities.     Assessment   Pt able to tolerate progression of therex to include increased repetitions to posterior thoracic strengthening. Pt with TTP along L SCOM, however with improved muscle tone. Pt with increased symptoms during head elevation from pillow in supine.  Pt required " cueing throughout therex for appropriate form and tracking. Will continue to monitor and progress as tolerated.  Pt will continue to benefit from skilled PT intervention. Medical Necessity is demonstrated by:  Pain limits function of effected part for some activities, Unable to participate fully in daily activities and Weakness.    Patient is making good progress towards established goals.    New/Revised goals: none at this time      Plan   Continue with established Plan of Care towards PT goals.     Laura Michael, PT

## 2018-03-22 ENCOUNTER — CLINICAL SUPPORT (OUTPATIENT)
Dept: REHABILITATION | Facility: HOSPITAL | Age: 83
End: 2018-03-22
Attending: INTERNAL MEDICINE
Payer: MEDICARE

## 2018-03-22 ENCOUNTER — OFFICE VISIT (OUTPATIENT)
Dept: RHEUMATOLOGY | Facility: CLINIC | Age: 83
End: 2018-03-22
Payer: MEDICARE

## 2018-03-22 VITALS
SYSTOLIC BLOOD PRESSURE: 112 MMHG | WEIGHT: 157.31 LBS | DIASTOLIC BLOOD PRESSURE: 62 MMHG | HEART RATE: 82 BPM | HEIGHT: 65 IN | BODY MASS INDEX: 26.21 KG/M2

## 2018-03-22 DIAGNOSIS — M54.2 NECK PAIN: ICD-10-CM

## 2018-03-22 DIAGNOSIS — R29.898 UPPER EXTREMITY WEAKNESS: ICD-10-CM

## 2018-03-22 DIAGNOSIS — M05.79 SEROPOSITIVE RHEUMATOID ARTHRITIS OF MULTIPLE SITES: Primary | ICD-10-CM

## 2018-03-22 PROCEDURE — 99213 OFFICE O/P EST LOW 20 MIN: CPT | Mod: PBBFAC | Performed by: INTERNAL MEDICINE

## 2018-03-22 PROCEDURE — 99214 OFFICE O/P EST MOD 30 MIN: CPT | Mod: S$PBB,,, | Performed by: INTERNAL MEDICINE

## 2018-03-22 PROCEDURE — 97110 THERAPEUTIC EXERCISES: CPT | Mod: KX,PO

## 2018-03-22 PROCEDURE — 99999 PR PBB SHADOW E&M-EST. PATIENT-LVL III: CPT | Mod: PBBFAC,,, | Performed by: INTERNAL MEDICINE

## 2018-03-22 NOTE — PROGRESS NOTES
"Name: Selma Alonzo Lux MD  Clinic Number: [id number]  Date of Treatment: 03/22/2018   Diagnosis:   Encounter Diagnoses   Name Primary?    Neck pain     Upper extremity weakness        Physician: Dee Thomson, *    Time in: 2:00   Time Out: 2: 40 PM  Total Treatment Time: 50 min   Last G-code: CK  Last PN: NA  Date of eval:12/01/2017  Visit #: 6/12  Auth expiration: 04/05/2018  POC expiration: 06/02/2018    Precautions: Standard     Subjective     Pt reports she is feeling a little bit better. Pt reports she has received new topical pain reliever which has helped.  Patient reports their pain to be 5/10 on a 0-10 scale with 0 being no pain and 10 being the worst pain imaginable.    Objective     Selma received therapeutic exercises to develop strength, endurance, ROM and posture for 25 minutes including:   Chin tucks 20 x 5" hold   AAROM cervical rotation in supine x 10 each (NP)  Seated SCOM stretch 3 x 20"   Standing scap retractionOTB x 30  PROM - cervical rotation and sidebending   Brueggers x 15  Standing Shoulder extension x 30 OTB  Wall slides x 10     Next visit: pec stretch       Selam received the following manual therapy techniques: Soft tissue Mobilization were applied to the: cervical spine for 5 minutes.   Gentle SCOM longitudinal stroking and strumming proximal 1/3 of SCOM  Cervical distraction     MHP applied to cervical spine x 8 minutes to promote pain control and relaxation. Pt skin intact following removal of MHP.     Written Home Exercises Provided: No, pt instructed to continue with previously issued HEP    Pt educated on new therex and purpose. Pt demo good understanding of the education provided. Selma demonstrated good return demonstration of activities.     Assessment   Pt able to tolerate treatment session without symptom provocation. Pt with significant improvement SCOM muscle tone and tolerance to STM. Pt able to tolerate addition of wall slides, but was unable to achieve " full AROM with LUE. Pt treatment session modified to accommodate for on time arrival to following appointment with rheumatology. Will continue to monitor and progress as tolerated.  Pt will continue to benefit from skilled PT intervention. Medical Necessity is demonstrated by:  Pain limits function of effected part for some activities, Unable to participate fully in daily activities and Weakness.    Patient is making good progress towards established goals.    New/Revised goals: none at this time      Plan   Continue with established Plan of Care towards PT goals.     Laura Michael, PT

## 2018-03-22 NOTE — PROGRESS NOTES
As per request of Dr. Prakash patient and patient's daughter to be instructed on Humira medication injection training.  Patient instructed on medication storage, disposal requirements, site rotation and possible s/s of injection site irritation, proper injection instructions and technique.  Patient able to verbalize and return demonstration.  Patient injected Humira 40mg/0.8ml Lot#[id number], Expiration 7/19, to right upper anterior thigh.  Remained in clinic 15 minutes post injection

## 2018-03-22 NOTE — PROGRESS NOTES
Chief Complaint   Patient presents with    Rheumatoid Arthritis     follow up       Patient is here for a follow up    She would like to  her about using her humira and wanted to express her concerns     History of presenting illness    82 year old black female with   IBS  Has anemia,PUD,fatty liver  She has hashimotos thyroiditis/goiter,low vit d   CVA  for rheumatoid arthritis for a follow up    I made all arrangements for her humira : ID cleared her     Her MRI left hand showed prominent diffuse tenosynovitis of the flexor and extensor tendons with tiny erosions and advanced osteoarthritic changes involving the thumb,DIP joints with erosive arthritis in the PIP joints    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   She has osteoarthritis of cervical spine,scoliosis of thoracic spine,LS spine arthritis,hip OA,Feet OA  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    She continues to have ESR 61/CRP 70.5  HLAB27 negative    Last time when she saw us we just resumed her plaquenil and prednisone  Prior to that she saw  6/2017    She takes plaquenil 200 mg bid and prednisone 5 mg daily    She had called us sep 2017 for hand symptoms and she was asked to take prednisone    Her complaints are :    Left hand : thumb cant do seat belt  4th finger hurts  Using it makes it worse  Hurts at rest too  Prednisone helped  Since September symptoms started and hasnt stopped    In the past she had RF between 20 to 40 and negative CCP  She had high inflammatory markers  She had xray changes showing erosive osteoarthritis     She gets shoulder injections and sees pain management for back pain  Both her rotator cuffs are torn  Gets botox for her neck symptoms    Has IBS  Has anemia,PUD,fatty liver  She has hashimotos thyroiditis/goiter,low vit d   CVA    Medications :   norvasc 5 mg,baclofen 10 mg,valium 2 mg,iron,gabapentin 300 mg tid,narcotics,plaquenil,singulair 10 mg,prednisone 5 mg,linaclotide  145 mcg,thiamine,plavix,memantine,fish oils,austedo,zoloft 25 mg    Past history: as stated above    Family history: none    Social history : not a smoker and alcoholic     Review of Systems   Constitutional: Negative for activity change, appetite change, chills, diaphoresis, fatigue, fever and unexpected weight change.   HENT: Negative for congestion, dental problem, drooling, ear discharge, ear pain, facial swelling, hearing loss, mouth sores, nosebleeds, postnasal drip, rhinorrhea, sinus pain, sinus pressure, sneezing, sore throat, tinnitus, trouble swallowing and voice change.    Eyes: Negative for photophobia, pain, discharge, redness, itching and visual disturbance.   Respiratory: Negative for apnea, cough, choking, chest tightness, shortness of breath, wheezing and stridor.    Cardiovascular: Negative for chest pain, palpitations and leg swelling.   Gastrointestinal: Negative for abdominal distention, abdominal pain, anal bleeding, blood in stool, constipation, diarrhea, nausea, rectal pain and vomiting.   Endocrine: Negative for cold intolerance, heat intolerance, polydipsia, polyphagia and polyuria.   Genitourinary: Negative for decreased urine volume, difficulty urinating, dysuria, enuresis, flank pain, frequency, genital sores, hematuria and urgency.   Musculoskeletal: Positive for arthralgias. Negative for back pain, gait problem, joint swelling, myalgias, neck pain and neck stiffness.   Skin: Negative for color change, pallor, rash and wound.   Allergic/Immunologic: Negative for environmental allergies, food allergies and immunocompromised state.   Neurological: Negative for dizziness, tremors, seizures, syncope, facial asymmetry, speech difficulty, weakness, light-headedness, numbness and headaches.   Hematological: Negative for adenopathy. Does not bruise/bleed easily.   Psychiatric/Behavioral: Negative for agitation, behavioral problems, confusion, decreased concentration, dysphoric mood,  hallucinations, self-injury, sleep disturbance and suicidal ideas. The patient is not nervous/anxious and is not hyperactive.      Physical Exam     SHEIKH-28 tender joint count: 0  SHEIKH-28 swollen joint count: 0    Tenderness:   Left hand: 1st MCP, 1st PIP, 2nd PIP, 4th PIP, 2nd DIP and 4th DIP    Swelling:   Left hand: 2nd PIP and 4th PIP    SHEIKH-28 tender joint count: 4  SHEIKH-28 swollen joint count: 2      Physical Exam   Constitutional: She is oriented to person, place, and time and well-developed, well-nourished, and in no distress. No distress.   HENT:   Head: Normocephalic.   Mouth/Throat: Oropharynx is clear and moist.   Eyes: Conjunctivae are normal. Pupils are equal, round, and reactive to light. Right eye exhibits no discharge. Left eye exhibits no discharge. No scleral icterus.   Neck: Normal range of motion. No thyromegaly present.   Cardiovascular: Normal rate, regular rhythm, normal heart sounds and intact distal pulses.    Pulmonary/Chest: Effort normal and breath sounds normal. No stridor.   Abdominal: Soft. Bowel sounds are normal.   Lymphadenopathy:     She has no cervical adenopathy.   Neurological: She is alert and oriented to person, place, and time.   Skin: Skin is warm. No rash noted. She is not diaphoretic.     Psychiatric: Affect and judgment normal.   Musculoskeletal: Normal range of motion.       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    She is here because her left hand 1rst and 4th digits have been bothering a lot and she has not been able to keep up with her activities of daily living like using her seat belt    Based on labs and xrays I suspected erosive osteoarthritis > rheumatoid arthritis : Asymmetric involvement/erosions in the DIPs/PIP and DIP involvement but I did MRI of the left hand and she has tenosynovitis and erosive changes typical of erosive  inflammatory arthritis  She continues to have very high inflammatory markers     She has erosive rheumatoid arthritis in combination with degenerative arthritis      1. Seropositive rheumatoid arthritis of multiple sites        Reviewed labs/MRI  Reviewed medications    Follow up problem      Plan    Will continue plaquenil and prednisone same doses for now    Initiate humira : we gave her the first shot today in the clinic   Our nurse did the teaching today  Discussed side effects like injection site reactions,infections,systemic anaphylaxis,neurologic symptoms and rare potential for malignancies    Hand therapy referral    Offered dexa    Offered hand surgery evaluation,she refuses yet    Selma was seen today for rheumatoid arthritis.    Diagnoses and all orders for this visit:    Seropositive rheumatoid arthritis of multiple sites        rtc in 3 months

## 2018-03-27 ENCOUNTER — CLINICAL SUPPORT (OUTPATIENT)
Dept: REHABILITATION | Facility: HOSPITAL | Age: 83
End: 2018-03-27
Attending: INTERNAL MEDICINE
Payer: MEDICARE

## 2018-03-27 ENCOUNTER — OFFICE VISIT (OUTPATIENT)
Dept: ORTHOPEDICS | Facility: CLINIC | Age: 83
End: 2018-03-27
Attending: PHYSICAL MEDICINE & REHABILITATION
Payer: MEDICARE

## 2018-03-27 ENCOUNTER — HOSPITAL ENCOUNTER (OUTPATIENT)
Dept: RADIOLOGY | Facility: OTHER | Age: 83
Discharge: HOME | End: 2018-03-27
Attending: ORTHOPAEDIC SURGERY
Payer: MEDICARE

## 2018-03-27 VITALS
HEART RATE: 88 BPM | WEIGHT: 157.44 LBS | BODY MASS INDEX: 26.23 KG/M2 | DIASTOLIC BLOOD PRESSURE: 61 MMHG | HEIGHT: 65 IN | SYSTOLIC BLOOD PRESSURE: 104 MMHG

## 2018-03-27 DIAGNOSIS — R29.898 UPPER EXTREMITY WEAKNESS: ICD-10-CM

## 2018-03-27 DIAGNOSIS — M19.012 ARTHRITIS OF SHOULDER REGION, LEFT: Primary | ICD-10-CM

## 2018-03-27 DIAGNOSIS — G24.3 CERVICAL DYSTONIA: Primary | ICD-10-CM

## 2018-03-27 DIAGNOSIS — M54.2 NECK PAIN: ICD-10-CM

## 2018-03-27 DIAGNOSIS — M79.642 LEFT HAND PAIN: Primary | ICD-10-CM

## 2018-03-27 DIAGNOSIS — M79.642 LEFT HAND PAIN: ICD-10-CM

## 2018-03-27 DIAGNOSIS — M25.512 LEFT SHOULDER PAIN, UNSPECIFIED CHRONICITY: ICD-10-CM

## 2018-03-27 PROCEDURE — 73030 X-RAY EXAM OF SHOULDER: CPT | Mod: 26,LT,, | Performed by: RADIOLOGY

## 2018-03-27 PROCEDURE — 99214 OFFICE O/P EST MOD 30 MIN: CPT | Mod: 25,S$PBB,, | Performed by: ORTHOPAEDIC SURGERY

## 2018-03-27 PROCEDURE — 99999 PR PBB SHADOW E&M-EST. PATIENT-LVL III: CPT | Mod: PBBFAC,,, | Performed by: ORTHOPAEDIC SURGERY

## 2018-03-27 PROCEDURE — 97110 THERAPEUTIC EXERCISES: CPT | Mod: KX,PO

## 2018-03-27 PROCEDURE — 99213 OFFICE O/P EST LOW 20 MIN: CPT | Mod: PBBFAC,25 | Performed by: ORTHOPAEDIC SURGERY

## 2018-03-27 PROCEDURE — 73130 X-RAY EXAM OF HAND: CPT | Mod: 26,LT,, | Performed by: RADIOLOGY

## 2018-03-27 PROCEDURE — 73030 X-RAY EXAM OF SHOULDER: CPT | Mod: TC,FY,LT

## 2018-03-27 PROCEDURE — 73130 X-RAY EXAM OF HAND: CPT | Mod: TC,FY,LT

## 2018-03-27 PROCEDURE — 20610 DRAIN/INJ JOINT/BURSA W/O US: CPT | Mod: PBBFAC | Performed by: ORTHOPAEDIC SURGERY

## 2018-03-27 RX ADMIN — TRIAMCINOLONE ACETONIDE 80 MG: 40 INJECTION, SUSPENSION INTRA-ARTICULAR; INTRAMUSCULAR at 11:03

## 2018-03-27 NOTE — PROCEDURES
Large Joint Aspiration/Injection  Date/Time: 3/27/2018 11:15 AM  Performed by: EAN MENDEZ  Authorized by: EAN MENDEZ     Consent Done?:  Yes (Verbal)  Indications:  Pain  Timeout: Prior to procedure the correct patient, procedure, and site was verified      Location:  Shoulder  Site:  L glenohumeral  Needle size:  22 G  Approach:  Posterior  Medications:  80 mg triamcinolone acetonide 40 mg/mL

## 2018-03-27 NOTE — PROGRESS NOTES
"Name: Selma Alonzo Lux MD  Clinic Number: [id number]  Date of Treatment: 03/27/2018   Diagnosis:   Encounter Diagnoses   Name Primary?    Neck pain     Upper extremity weakness        Physician: Dee Thomson, *    Time in: 2:00   Time Out: 2: 40 PM  Total Treatment Time: 50 min   Last G-code: CK  Last PN: NA  Date of eval:12/01/2017  Visit #: 7/12  Auth expiration: 04/05/2018  POC expiration: 06/02/2018    Precautions: Standard     Subjective     Pt reports she is feeling better, but the pain continues to still be present.  Patient reports their pain to be 3/10 on a 0-10 scale with 0 being no pain and 10 being the worst pain imaginable.    Objective     Selma received therapeutic exercises to develop strength, endurance, ROM and posture for 25 minutes including:   Chin tucks 20 x 5" hold   AAROM cervical rotation in supine x 10 each   Seated SCOM stretch 3 x 20"  (NP)  Standing scap retractionOTB x 30  PROM - cervical rotation and sidebending   Brueggers x 15  Standing Shoulder extension x 30 OTB  Wall slides x 10     Next visit: pec stretch       Selma received the following manual therapy techniques: Soft tissue Mobilization were applied to the: cervical spine for 5 minutes.   Gentle SCOM longitudinal stroking and strumming proximal 1/3 of SCOM  Cervical distraction     MHP applied to cervical spine x 8 minutes to promote pain control and relaxation. Pt skin intact following removal of MHP.     Written Home Exercises Provided: No, pt instructed to continue with previously issued HEP    Pt educated on new therex and purpose. Pt demo good understanding of the education provided. Selma demonstrated good return demonstration of activities.     Assessment   Pt with significant fatigue during treatment session. Pt falling asleep during manual therapy treatment and supine/seated therex. Pt required cueing to perform exercise techniques appropriately. Pt will benefit from re-assessment next treatment " session to monitor progress in therapy.  Will continue to monitor and progress as tolerated.  Pt will continue to benefit from skilled PT intervention. Medical Necessity is demonstrated by:  Pain limits function of effected part for some activities, Unable to participate fully in daily activities and Weakness.    Patient is making good progress towards established goals.    New/Revised goals: none at this time      Plan   Continue with established Plan of Care towards PT goals.     Laura Michael, PT

## 2018-03-27 NOTE — PROGRESS NOTES
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 She notes she was dx with bl rotator cuff tears about 3-5 years ago, this was treated conservatively with PT as well as injections. She reports she did have improvement from this aspect. About a year ago she began having pain again, especially in the left shoulder, this worsened over the past 6 months. She has not yet tried anything for this. She is currently in PT for another reason.       Review of patient's allergies indicates:  No Known Allergies      Current Outpatient Prescriptions   Medication Sig Dispense Refill    adalimumab (HUMIRA PEN) PnKt injection Inject 0.8 mLs (40 mg total) into the skin every 14 (fourteen) days. 4 each 6    amlodipine (NORVASC) 5 MG tablet TAKE 1 TABLET DAILY 90 tablet 2    baclofen (LIORESAL) 10 MG tablet Take 1 tablet (10 mg total) by mouth 3 (three) times daily. 270 tablet 2    clopidogrel (PLAVIX) 75 mg tablet Take 1 tablet (75 mg total) by mouth once daily. 90 tablet 1    diazePAM (VALIUM) 2 MG tablet Take 1 tablet (2 mg total) by mouth every evening. 30 tablet 3    ferrous sulfate 325 (65 FE) MG EC tablet Take 325 mg by mouth once daily.       gabapentin (NEURONTIN) 300 MG capsule Take 1-2 capsules (300-600 mg total) by mouth 3 (three) times daily. 540 capsule 1    hydroxychloroquine (PLAQUENIL) 200 mg tablet TAKE 2 TABLETS ONCE DAILY 180 tablet 1    linaclotide (LINZESS) 145 mcg Cap capsule Take 1 capsule (145 mcg total) by mouth once daily. 30 capsule 0    montelukast (SINGULAIR) 10 mg tablet TAKE 1 TABLET EVERY EVENING 90 tablet 2    omega 3-dha-epa-fish oil 250-350-1,000 mg Cap Take 1 capsule by mouth 2 (two) times daily. 180 capsule 3    omeprazole (PRILOSEC) 20 MG capsule Take 1 capsule (20 mg total) by mouth once  "daily. 90 capsule 3    predniSONE (DELTASONE) 5 MG tablet TAKE 2 TABLETS ONCE DAILY (Patient taking differently: TAKE 1 TABLETS ONCE DAILY) 180 tablet 0    thiamine 100 MG tablet Take 1 tablet (100 mg total) by mouth once daily.      hydrocodone-acetaminophen 5-325mg (NORCO) 5-325 mg per tablet Take 1 tablet by mouth every 24 hours as needed for Pain. 30 tablet 0     No current facility-administered medications for this visit.        Past Medical History:   Diagnosis Date    Anemia     Arthritis     Hashimoto's disease     Hypertension     IGT (impaired glucose tolerance) 1/12/2016    Joint pain     Multinodular goiter 1/12/2016    Stroke        Past Surgical History:   Procedure Laterality Date    CATARACT EXTRACTION      COLONOSCOPY N/A 9/29/2015    Procedure: COLONOSCOPY;  Surgeon: FIDELINA Sanchez MD;  Location: [address]);  Service: Endoscopy;  Laterality: N/A;    EYE SURGERY      JOINT REPLACEMENT      right knee    KNEE SURGERY Left 12/31/2013    TKR    left parotidectomy      mixed tumor    SALIVARY GLAND SURGERY      TONSILLECTOMY         Review of Systems:  Constitutional: Negative for chills and fever.   Respiratory: Negative for cough and shortness of breath.    Gastrointestinal: Negative for nausea and vomiting.   Skin: Negative for rash.   Neurological: Negative for dizziness and headaches.   Psychiatric/Behavioral: Negative for depression.   MSK as in HPI       OBJECTIVE:     PHYSICAL EXAM:  /61   Pulse 88   Ht 5' 5" (1.651 m)   Wt 71.4 kg (157 lb 6.5 oz)   LMP  (LMP Unknown)   BMI 26.19 kg/m²     GEN:  NAD, well-developed, well-groomed.  NEURO: Awake, alert, and oriented. Normal attention and concentration.    PSYCH: Normal mood and affect. Behavior is normal.  HEENT: No cervical lymphadenopathy noted.  CARDIOVASCULAR: Radial pulses 2+ bilaterally. No LE edema noted.  PULMONARY: Breath sounds normal. No respiratory distress.  SKIN: Intact, no rashes.      MSK: "   LUE:  Good active ROM of the wrist and fingers. She has significant decreased ROM of the left shoulder, >45 degrees forward elevation and abduction, fair ER, limited IR. No significant ttp over the shoulder. AIN/PIN/Radial/Median/Ulnar Nerves assessed in isolation without deficit. Radial & Ulnar arteries palpated 2+. Capillary Refill <3s.      RADIOGRAPHS:  Xray left shoulder 3/27/18  Impression   No evidence for acute fracture, bone destruction, or dislocation.    Advanced degenerative changes of the glenohumeral joint.    Narrowing of the subacromial distance which is less pronounced than on the prior examination dated 02/05/2015.     Comments: I have personally reviewed the imaging and I agree with the above radiologist's report.    ASSESSMENT/PLAN:       ICD-10-CM ICD-9-CM   1. Arthritis of shoulder region, left M19.012 716.91       Orders Placed This Encounter    Large Joint Aspiration/Injection    Ambulatory Referral to Physical/Occupational Therapy     Orders Placed This Encounter   Procedures    Large Joint Aspiration/Injection    Ambulatory Referral to Physical/Occupational Therapy        Plan:   -treatment options discussed with pt. We will proceed with left shoulder injection today along with PT and compound cream.  -RTC 6 wks       I have explained the risks, benefits, and alternatives of the procedure in detail.  The patient voices understanding and all questions have been answered.  The patient agrees to proceed as planned. After a sterile prep of the skin in the normal the left shoulder is injected from the posterior approach using a 22 gauge needle with a combination of 4cc 1% marcaine and 80 mg of kenalog. The patient is cautioned and immediate relief of pain is secondary to the local anesthetic and will be temporary.  After the anesthetic wears off there may be a increase in pain that may last for a few hours or a few days and they should use ice to help alleviate this flair up of pain.        The patient indicates understanding of these issues and agrees to the plan.    Jodee Perea PA-C  [facility]   [facility] [place]

## 2018-03-28 RX ORDER — TRIAMCINOLONE ACETONIDE 40 MG/ML
80 INJECTION, SUSPENSION INTRA-ARTICULAR; INTRAMUSCULAR
Status: DISCONTINUED | OUTPATIENT
Start: 2018-03-27 | End: 2018-03-28 | Stop reason: HOSPADM

## 2018-03-28 NOTE — PROGRESS NOTES
I have personally taken the history and examined the patient. I agree with the Hand Surgery PA's note. The plan will be injection, PT, compound cream. Pt has significant OA of shoulder. Plan for conservative treatment

## 2018-03-31 PROCEDURE — 99490 CHRNC CARE MGMT STAFF 1ST 20: CPT | Mod: PBBFAC | Performed by: FAMILY MEDICINE

## 2018-03-31 PROCEDURE — 99490 CHRNC CARE MGMT STAFF 1ST 20: CPT | Mod: S$PBB,,, | Performed by: FAMILY MEDICINE

## 2018-04-01 ENCOUNTER — EXTERNAL CHRONIC CARE MANAGEMENT (OUTPATIENT)
Dept: PRIMARY CARE CLINIC | Facility: CLINIC | Age: 83
End: 2018-04-01
Payer: MEDICARE

## 2018-04-02 ENCOUNTER — TELEPHONE (OUTPATIENT)
Dept: INTERNAL MEDICINE | Facility: CLINIC | Age: 83
End: 2018-04-02

## 2018-04-02 ENCOUNTER — HOSPITAL ENCOUNTER (OUTPATIENT)
Dept: RADIOLOGY | Facility: HOSPITAL | Age: 83
Discharge: HOME | DRG: 853 | End: 2018-04-02
Attending: FAMILY MEDICINE
Payer: MEDICARE

## 2018-04-02 ENCOUNTER — OFFICE VISIT (OUTPATIENT)
Dept: INTERNAL MEDICINE | Facility: CLINIC | Age: 83
DRG: 853 | End: 2018-04-02
Attending: FAMILY MEDICINE
Payer: MEDICARE

## 2018-04-02 VITALS
SYSTOLIC BLOOD PRESSURE: 120 MMHG | HEIGHT: 65 IN | HEART RATE: 73 BPM | DIASTOLIC BLOOD PRESSURE: 64 MMHG | WEIGHT: 150.19 LBS | BODY MASS INDEX: 25.02 KG/M2

## 2018-04-02 DIAGNOSIS — I50.9 CONGESTIVE HEART FAILURE, UNSPECIFIED CONGESTIVE HEART FAILURE CHRONICITY, UNSPECIFIED CONGESTIVE HEART FAILURE TYPE: Primary | ICD-10-CM

## 2018-04-02 DIAGNOSIS — G89.29 CHRONIC BILATERAL LOW BACK PAIN WITHOUT SCIATICA: ICD-10-CM

## 2018-04-02 DIAGNOSIS — R05.9 COUGH: ICD-10-CM

## 2018-04-02 DIAGNOSIS — R05.9 COUGH: Primary | ICD-10-CM

## 2018-04-02 DIAGNOSIS — Z79.52 LONG TERM (CURRENT) USE OF SYSTEMIC STEROIDS: ICD-10-CM

## 2018-04-02 DIAGNOSIS — M05.79 SEROPOSITIVE RHEUMATOID ARTHRITIS OF MULTIPLE SITES: ICD-10-CM

## 2018-04-02 DIAGNOSIS — I63.9 CEREBROVASCULAR ACCIDENT (CVA), UNSPECIFIED MECHANISM: ICD-10-CM

## 2018-04-02 DIAGNOSIS — M54.50 CHRONIC BILATERAL LOW BACK PAIN WITHOUT SCIATICA: ICD-10-CM

## 2018-04-02 PROBLEM — R26.9 ABNORMALITY OF GAIT AND MOBILITY: Status: RESOLVED | Noted: 2017-03-23 | Resolved: 2018-04-02

## 2018-04-02 PROBLEM — M54.2 NECK PAIN: Status: RESOLVED | Noted: 2018-03-02 | Resolved: 2018-04-02

## 2018-04-02 PROCEDURE — 99213 OFFICE O/P EST LOW 20 MIN: CPT | Mod: PBBFAC,25 | Performed by: FAMILY MEDICINE

## 2018-04-02 PROCEDURE — 99999 PR PBB SHADOW E&M-EST. PATIENT-LVL III: CPT | Mod: PBBFAC,,, | Performed by: FAMILY MEDICINE

## 2018-04-02 PROCEDURE — 71046 X-RAY EXAM CHEST 2 VIEWS: CPT | Mod: 26,,, | Performed by: RADIOLOGY

## 2018-04-02 PROCEDURE — 99214 OFFICE O/P EST MOD 30 MIN: CPT | Mod: S$PBB,,, | Performed by: FAMILY MEDICINE

## 2018-04-02 PROCEDURE — 71046 X-RAY EXAM CHEST 2 VIEWS: CPT | Mod: TC

## 2018-04-02 RX ORDER — DOXYCYCLINE HYCLATE 100 MG
100 TABLET ORAL 2 TIMES DAILY
Qty: 14 TABLET | Refills: 0 | Status: ON HOLD | OUTPATIENT
Start: 2018-04-02 | End: 2018-04-27

## 2018-04-02 RX ORDER — ALBUTEROL SULFATE 90 UG/1
2 AEROSOL, METERED RESPIRATORY (INHALATION) EVERY 6 HOURS PRN
Qty: 1 INHALER | Refills: 11 | Status: SHIPPED | OUTPATIENT
Start: 2018-04-02 | End: 2018-06-29

## 2018-04-02 RX ORDER — ALBUTEROL SULFATE 90 UG/1
2 AEROSOL, METERED RESPIRATORY (INHALATION) EVERY 6 HOURS PRN
Qty: 1 INHALER | Refills: 11 | Status: SHIPPED | OUTPATIENT
Start: 2018-04-02 | End: 2018-04-02 | Stop reason: SDUPTHER

## 2018-04-02 RX ORDER — BENZONATATE 200 MG/1
200 CAPSULE ORAL 3 TIMES DAILY PRN
Qty: 30 CAPSULE | Refills: 1 | Status: ON HOLD | OUTPATIENT
Start: 2018-04-02 | End: 2018-05-01 | Stop reason: HOSPADM

## 2018-04-02 NOTE — PROGRESS NOTES
Subjective:       Patient ID: Selma Rosado MD Jose J is a 82 y.o. female.    Chief Complaint: Cough    HPI  Review of Systems   Constitutional: Negative for chills, fatigue and fever.   HENT: Positive for congestion. Negative for trouble swallowing.    Eyes: Negative for redness.   Respiratory: Positive for cough and shortness of breath. Negative for chest tightness and wheezing.    Cardiovascular: Positive for leg swelling. Negative for chest pain and palpitations.   Gastrointestinal: Negative for abdominal pain and blood in stool.   Genitourinary: Negative for hematuria and menstrual problem.   Musculoskeletal: Positive for arthralgias, myalgias and neck pain. Negative for back pain, gait problem and joint swelling.   Skin: Negative for color change and rash.   Neurological: Negative for tremors, speech difficulty, weakness, numbness and headaches.   Hematological: Negative for adenopathy. Does not bruise/bleed easily.   Psychiatric/Behavioral: Negative for behavioral problems, confusion and sleep disturbance. The patient is not nervous/anxious.        Objective:      Physical Exam   Constitutional: She is oriented to person, place, and time. She appears well-developed and well-nourished. No distress.   HENT:   Head: Normocephalic.   Right Ear: Tympanic membrane, external ear and ear canal normal.   Left Ear: Tympanic membrane, external ear and ear canal normal.   Nose: Nose normal.   Mouth/Throat: Oropharynx is clear and moist. No oropharyngeal exudate.   Eyes: Conjunctivae are normal. Right eye exhibits no discharge. Left eye exhibits no discharge. No scleral icterus.   Neck: Normal range of motion. Neck supple. No thyromegaly present.   Cardiovascular: Normal rate, regular rhythm and intact distal pulses.  Exam reveals distant heart sounds. Exam reveals no gallop and no friction rub.    No murmur heard.  Pulmonary/Chest: Effort normal. No respiratory distress. She has decreased breath sounds. She has no  wheezes. She has no rales. She exhibits no tenderness.   Abdominal: Soft. There is no tenderness.   Musculoskeletal: She exhibits no edema.   Lymphadenopathy:     She has no cervical adenopathy.   Neurological: She is alert and oriented to person, place, and time.   Skin: Skin is warm and dry. No rash noted. She is not diaphoretic.   Nursing note and vitals reviewed.      Assessment:       1. Cough    2. Seropositive rheumatoid arthritis of multiple sites    3. Long term (current) use of systemic steroids    4. Cerebrovascular accident (CVA), unspecified mechanism    5. Chronic bilateral low back pain without sciatica        Plan:   Selma was seen today for cough.    Diagnoses and all orders for this visit:    Cough  -     X-Ray Chest PA And Lateral; Future    Seropositive rheumatoid arthritis of multiple sites    Long term (current) use of systemic steroids    Cerebrovascular accident (CVA), unspecified mechanism    Chronic bilateral low back pain without sciatica    Other orders  -     Discontinue: albuterol 90 mcg/actuation inhaler; Inhale 2 puffs into the lungs every 6 (six) hours as needed for Wheezing or Shortness of Breath.  -     benzonatate (TESSALON) 200 MG capsule; Take 1 capsule (200 mg total) by mouth 3 (three) times daily as needed for Cough.  -     albuterol 90 mcg/actuation inhaler; Inhale 2 puffs into the lungs every 6 (six) hours as needed for Wheezing or Shortness of Breath.      See meds, orders, follow up, routing and instructions sections of encounter.  This is an 82-year-old established female patient.  She is coughing three weeks,   presents with her sister.  There is no sputum production.  She does have some   mild dyspnea.  She stated that she was coughing to the point where a relative   became concerned and almost took her to the Emergency Room over the weekend.    She is having no distinct chest pain.    She was recently started on Humira.  We also recently started her on Plavix.    She  had several recent consult visits that I reviewed.  She continues to take   prednisone.  She continues to complain of joint pain in the hands, shoulders,   swelling in the ankles.  She has no chest pain.    RECOMMENDATIONS:  1.  Investigate cough with chest x-ray.  2.  Tessalon Perles.  3.  Albuterol MDI.  4.  No change of other medications.  She will be due for Humira second shot   sometime soon.  She was amenable and continues to take Plavix without   difficulty.  She does not want to entertain any new long-term medications for   use.  I asked her to call me if she is not better in a week or two.      SELENA/ALLAN  dd: 04/02/2018 16:04:49 (CDT)  td: 04/03/2018 12:54:20 (CDT)  Doc ID   #[id number]  Job ID #[id number]    CC:

## 2018-04-02 NOTE — TELEPHONE ENCOUNTER
Called patient and discussed labs and or test results. Patient expressed understanding and had the opportunity to ask questions. Any questions were answered. See meds, orders, follow up and instructions sections of encounter.    Specific issues include:  CXR noted -   Will check BNP and refer to cardiology -   I will add doxycyline as well -

## 2018-04-05 ENCOUNTER — TELEPHONE (OUTPATIENT)
Dept: INTERNAL MEDICINE | Facility: CLINIC | Age: 83
End: 2018-04-05

## 2018-04-05 ENCOUNTER — NURSE TRIAGE (OUTPATIENT)
Dept: ADMINISTRATIVE | Facility: CLINIC | Age: 83
End: 2018-04-05

## 2018-04-05 ENCOUNTER — HOSPITAL ENCOUNTER (INPATIENT)
Facility: HOSPITAL | Age: 83
LOS: 27 days | Discharge: NURSING, REHAB OR LONG-TERM CARE | DRG: 853 | End: 2018-05-02
Attending: EMERGENCY MEDICINE | Admitting: HOSPITALIST
Payer: MEDICARE

## 2018-04-05 DIAGNOSIS — D50.8 IRON DEFICIENCY ANEMIA SECONDARY TO INADEQUATE DIETARY IRON INTAKE: ICD-10-CM

## 2018-04-05 DIAGNOSIS — I10 ESSENTIAL HYPERTENSION: ICD-10-CM

## 2018-04-05 DIAGNOSIS — J96.01 ACUTE RESPIRATORY FAILURE WITH HYPOXIA: ICD-10-CM

## 2018-04-05 DIAGNOSIS — N17.9 AKI (ACUTE KIDNEY INJURY): ICD-10-CM

## 2018-04-05 DIAGNOSIS — J18.9 PNEUMONIA OF BOTH LUNGS DUE TO INFECTIOUS ORGANISM, UNSPECIFIED PART OF LUNG: Primary | ICD-10-CM

## 2018-04-05 DIAGNOSIS — M05.79 SEROPOSITIVE RHEUMATOID ARTHRITIS OF MULTIPLE SITES: ICD-10-CM

## 2018-04-05 DIAGNOSIS — F01.50 VASCULAR DEMENTIA WITHOUT BEHAVIORAL DISTURBANCE: ICD-10-CM

## 2018-04-05 DIAGNOSIS — R06.02 SHORTNESS OF BREATH: ICD-10-CM

## 2018-04-05 DIAGNOSIS — Z79.52 LONG TERM (CURRENT) USE OF SYSTEMIC STEROIDS: ICD-10-CM

## 2018-04-05 DIAGNOSIS — R06.00 DYSPNEA: ICD-10-CM

## 2018-04-05 DIAGNOSIS — I10 HYPERTENSION, ESSENTIAL: ICD-10-CM

## 2018-04-05 DIAGNOSIS — R05.9 COUGH: ICD-10-CM

## 2018-04-05 DIAGNOSIS — K27.9 PUD (PEPTIC ULCER DISEASE): ICD-10-CM

## 2018-04-05 DIAGNOSIS — R13.12 OROPHARYNGEAL DYSPHAGIA: ICD-10-CM

## 2018-04-05 DIAGNOSIS — E55.9 HYPOVITAMINOSIS D: ICD-10-CM

## 2018-04-05 DIAGNOSIS — J80 ARDS (ADULT RESPIRATORY DISTRESS SYNDROME): ICD-10-CM

## 2018-04-05 DIAGNOSIS — I50.9 CHF, ACUTE: ICD-10-CM

## 2018-04-05 DIAGNOSIS — J18.9 PNEUMONIA OF BOTH UPPER LOBES DUE TO INFECTIOUS ORGANISM: ICD-10-CM

## 2018-04-05 DIAGNOSIS — Z86.73 HISTORY OF STROKE: ICD-10-CM

## 2018-04-05 DIAGNOSIS — E87.5 HYPERKALEMIA: ICD-10-CM

## 2018-04-05 DIAGNOSIS — J96.01 ACUTE HYPOXEMIC RESPIRATORY FAILURE: ICD-10-CM

## 2018-04-05 DIAGNOSIS — R73.9 HYPERGLYCEMIA: ICD-10-CM

## 2018-04-05 DIAGNOSIS — R65.21 SEPTIC SHOCK: ICD-10-CM

## 2018-04-05 DIAGNOSIS — A41.9 SEPTIC SHOCK: ICD-10-CM

## 2018-04-05 DIAGNOSIS — R94.31 LONG QT INTERVAL: ICD-10-CM

## 2018-04-05 DIAGNOSIS — E87.6 HYPOKALEMIA: ICD-10-CM

## 2018-04-05 DIAGNOSIS — J18.9 PNEUMONIA OF BOTH LUNGS DUE TO INFECTIOUS ORGANISM: ICD-10-CM

## 2018-04-05 DIAGNOSIS — G93.41 ENCEPHALOPATHY, METABOLIC: ICD-10-CM

## 2018-04-05 DIAGNOSIS — D72.829 LEUKOCYTOSIS, UNSPECIFIED TYPE: ICD-10-CM

## 2018-04-05 PROBLEM — J13 PNEUMONIA DUE TO STREPTOCOCCUS PNEUMONIAE: Status: ACTIVE | Noted: 2018-04-05

## 2018-04-05 LAB
ALBUMIN SERPL BCP-MCNC: 2.5 G/DL
ALP SERPL-CCNC: 142 U/L
ALT SERPL W/O P-5'-P-CCNC: 47 U/L
ANION GAP SERPL CALC-SCNC: 8 MMOL/L
ANISOCYTOSIS BLD QL SMEAR: SLIGHT
AST SERPL-CCNC: 48 U/L
BASOPHILS # BLD AUTO: 0.03 K/UL
BASOPHILS NFR BLD: 0.1 %
BILIRUB SERPL-MCNC: 0.9 MG/DL
BILIRUB UR QL STRIP: NEGATIVE
BNP SERPL-MCNC: 152 PG/ML
BUN SERPL-MCNC: 13 MG/DL
BURR CELLS BLD QL SMEAR: ABNORMAL
CALCIUM SERPL-MCNC: 8.7 MG/DL
CHLORIDE SERPL-SCNC: 104 MMOL/L
CLARITY UR REFRACT.AUTO: CLEAR
CO2 SERPL-SCNC: 25 MMOL/L
COLOR UR AUTO: YELLOW
CREAT SERPL-MCNC: 1 MG/DL
DIFFERENTIAL METHOD: ABNORMAL
EOSINOPHIL # BLD AUTO: 0.3 K/UL
EOSINOPHIL NFR BLD: 1.1 %
ERYTHROCYTE [DISTWIDTH] IN BLOOD BY AUTOMATED COUNT: 15 %
EST. GFR  (AFRICAN AMERICAN): >60 ML/MIN/1.73 M^2
EST. GFR  (NON AFRICAN AMERICAN): 53.3 ML/MIN/1.73 M^2
GIANT PLATELETS BLD QL SMEAR: PRESENT
GLUCOSE SERPL-MCNC: 159 MG/DL
GLUCOSE UR QL STRIP: NEGATIVE
HCT VFR BLD AUTO: 34 %
HGB BLD-MCNC: 10.3 G/DL
HGB UR QL STRIP: NEGATIVE
HYPOCHROMIA BLD QL SMEAR: ABNORMAL
IMM GRANULOCYTES # BLD AUTO: 0.11 K/UL
IMM GRANULOCYTES NFR BLD AUTO: 0.5 %
INR PPP: 1.2
KETONES UR QL STRIP: NEGATIVE
LACTATE SERPL-SCNC: 1.4 MMOL/L
LEUKOCYTE ESTERASE UR QL STRIP: ABNORMAL
LYMPHOCYTES # BLD AUTO: 1.3 K/UL
LYMPHOCYTES NFR BLD: 5.5 %
MAGNESIUM SERPL-MCNC: 1.8 MG/DL
MCH RBC QN AUTO: 23.6 PG
MCHC RBC AUTO-ENTMCNC: 30.3 G/DL
MCV RBC AUTO: 78 FL
MICROSCOPIC COMMENT: NORMAL
MONOCYTES # BLD AUTO: 3.8 K/UL
MONOCYTES NFR BLD: 16 %
NEUTROPHILS # BLD AUTO: 18.1 K/UL
NEUTROPHILS NFR BLD: 76.8 %
NITRITE UR QL STRIP: NEGATIVE
NRBC BLD-RTO: 0 /100 WBC
OVALOCYTES BLD QL SMEAR: ABNORMAL
PH UR STRIP: 6 [PH] (ref 5–8)
PLATELET # BLD AUTO: 418 K/UL
PLATELET BLD QL SMEAR: ABNORMAL
PMV BLD AUTO: 9.5 FL
POIKILOCYTOSIS BLD QL SMEAR: SLIGHT
POLYCHROMASIA BLD QL SMEAR: ABNORMAL
POTASSIUM SERPL-SCNC: 4 MMOL/L
PROCALCITONIN SERPL IA-MCNC: 0.75 NG/ML
PROT SERPL-MCNC: 7.2 G/DL
PROT UR QL STRIP: NEGATIVE
PROTHROMBIN TIME: 12.7 SEC
RBC # BLD AUTO: 4.37 M/UL
RBC #/AREA URNS AUTO: 0 /HPF (ref 0–4)
SODIUM SERPL-SCNC: 137 MMOL/L
SP GR UR STRIP: 1 (ref 1–1.03)
SQUAMOUS #/AREA URNS AUTO: 3 /HPF
TROPONIN I SERPL DL<=0.01 NG/ML-MCNC: 0.01 NG/ML
URN SPEC COLLECT METH UR: ABNORMAL
UROBILINOGEN UR STRIP-ACNC: NEGATIVE EU/DL
WBC # BLD AUTO: 23.59 K/UL
WBC #/AREA URNS AUTO: 2 /HPF (ref 0–5)

## 2018-04-05 PROCEDURE — 96365 THER/PROPH/DIAG IV INF INIT: CPT

## 2018-04-05 PROCEDURE — 99285 EMERGENCY DEPT VISIT HI MDM: CPT | Mod: 25

## 2018-04-05 PROCEDURE — 85610 PROTHROMBIN TIME: CPT

## 2018-04-05 PROCEDURE — 96375 TX/PRO/DX INJ NEW DRUG ADDON: CPT

## 2018-04-05 PROCEDURE — 93010 ELECTROCARDIOGRAM REPORT: CPT | Mod: ,,, | Performed by: INTERNAL MEDICINE

## 2018-04-05 PROCEDURE — 83735 ASSAY OF MAGNESIUM: CPT

## 2018-04-05 PROCEDURE — 84484 ASSAY OF TROPONIN QUANT: CPT

## 2018-04-05 PROCEDURE — 63600175 PHARM REV CODE 636 W HCPCS: Performed by: EMERGENCY MEDICINE

## 2018-04-05 PROCEDURE — 87040 BLOOD CULTURE FOR BACTERIA: CPT | Mod: 59

## 2018-04-05 PROCEDURE — 84145 PROCALCITONIN (PCT): CPT

## 2018-04-05 PROCEDURE — 83605 ASSAY OF LACTIC ACID: CPT

## 2018-04-05 PROCEDURE — 80053 COMPREHEN METABOLIC PANEL: CPT

## 2018-04-05 PROCEDURE — 83880 ASSAY OF NATRIURETIC PEPTIDE: CPT

## 2018-04-05 PROCEDURE — 25000003 PHARM REV CODE 250: Performed by: EMERGENCY MEDICINE

## 2018-04-05 PROCEDURE — 81001 URINALYSIS AUTO W/SCOPE: CPT

## 2018-04-05 PROCEDURE — 85025 COMPLETE CBC W/AUTO DIFF WBC: CPT

## 2018-04-05 PROCEDURE — 87086 URINE CULTURE/COLONY COUNT: CPT

## 2018-04-05 PROCEDURE — 99285 EMERGENCY DEPT VISIT HI MDM: CPT | Mod: ,,, | Performed by: EMERGENCY MEDICINE

## 2018-04-05 PROCEDURE — 99223 1ST HOSP IP/OBS HIGH 75: CPT | Mod: AI,,, | Performed by: HOSPITALIST

## 2018-04-05 PROCEDURE — 11000001 HC ACUTE MED/SURG PRIVATE ROOM

## 2018-04-05 RX ORDER — AMLODIPINE BESYLATE 5 MG/1
5 TABLET ORAL DAILY
Status: DISCONTINUED | OUTPATIENT
Start: 2018-04-06 | End: 2018-04-11

## 2018-04-05 RX ORDER — BENZONATATE 100 MG/1
200 CAPSULE ORAL 3 TIMES DAILY PRN
Status: DISCONTINUED | OUTPATIENT
Start: 2018-04-05 | End: 2018-04-07

## 2018-04-05 RX ORDER — RAMELTEON 8 MG/1
8 TABLET ORAL NIGHTLY PRN
Status: DISCONTINUED | OUTPATIENT
Start: 2018-04-05 | End: 2018-04-11

## 2018-04-05 RX ORDER — MONTELUKAST SODIUM 10 MG/1
10 TABLET ORAL DAILY
Status: DISCONTINUED | OUTPATIENT
Start: 2018-04-06 | End: 2018-04-11

## 2018-04-05 RX ORDER — ACETAMINOPHEN 325 MG/1
650 TABLET ORAL EVERY 8 HOURS PRN
Status: DISCONTINUED | OUTPATIENT
Start: 2018-04-05 | End: 2018-04-27

## 2018-04-05 RX ORDER — CLOPIDOGREL BISULFATE 75 MG/1
75 TABLET ORAL DAILY
Status: DISCONTINUED | OUTPATIENT
Start: 2018-04-06 | End: 2018-04-05

## 2018-04-05 RX ORDER — CLOPIDOGREL BISULFATE 75 MG/1
75 TABLET ORAL NIGHTLY
Status: DISCONTINUED | OUTPATIENT
Start: 2018-04-06 | End: 2018-04-12

## 2018-04-05 RX ORDER — IPRATROPIUM BROMIDE AND ALBUTEROL SULFATE 2.5; .5 MG/3ML; MG/3ML
3 SOLUTION RESPIRATORY (INHALATION)
Status: DISCONTINUED | OUTPATIENT
Start: 2018-04-06 | End: 2018-04-11

## 2018-04-05 RX ORDER — GABAPENTIN 300 MG/1
300 CAPSULE ORAL 3 TIMES DAILY
Status: DISCONTINUED | OUTPATIENT
Start: 2018-04-06 | End: 2018-04-11

## 2018-04-05 RX ORDER — PREDNISONE 5 MG/1
5 TABLET ORAL DAILY
Status: DISCONTINUED | OUTPATIENT
Start: 2018-04-06 | End: 2018-04-06

## 2018-04-05 RX ORDER — IBUPROFEN 200 MG
24 TABLET ORAL
Status: DISCONTINUED | OUTPATIENT
Start: 2018-04-05 | End: 2018-04-13

## 2018-04-05 RX ORDER — IBUPROFEN 200 MG
16 TABLET ORAL
Status: DISCONTINUED | OUTPATIENT
Start: 2018-04-05 | End: 2018-04-13

## 2018-04-05 RX ORDER — CEFTRIAXONE 1 G/1
1 INJECTION, POWDER, FOR SOLUTION INTRAMUSCULAR; INTRAVENOUS
Status: DISCONTINUED | OUTPATIENT
Start: 2018-04-06 | End: 2018-04-09

## 2018-04-05 RX ORDER — MONTELUKAST SODIUM 10 MG/1
10 TABLET ORAL NIGHTLY
Status: DISCONTINUED | OUTPATIENT
Start: 2018-04-05 | End: 2018-04-05

## 2018-04-05 RX ORDER — BACLOFEN 10 MG/1
10 TABLET ORAL 3 TIMES DAILY
Status: DISCONTINUED | OUTPATIENT
Start: 2018-04-06 | End: 2018-04-11

## 2018-04-05 RX ORDER — IPRATROPIUM BROMIDE AND ALBUTEROL SULFATE 2.5; .5 MG/3ML; MG/3ML
3 SOLUTION RESPIRATORY (INHALATION) EVERY 4 HOURS PRN
Status: DISCONTINUED | OUTPATIENT
Start: 2018-04-05 | End: 2018-05-02 | Stop reason: HOSPADM

## 2018-04-05 RX ORDER — DIAZEPAM 2 MG/1
2 TABLET ORAL DAILY
Status: DISCONTINUED | OUTPATIENT
Start: 2018-04-06 | End: 2018-04-06

## 2018-04-05 RX ORDER — CEFTRIAXONE 1 G/1
1 INJECTION, POWDER, FOR SOLUTION INTRAMUSCULAR; INTRAVENOUS
Status: COMPLETED | OUTPATIENT
Start: 2018-04-05 | End: 2018-04-05

## 2018-04-05 RX ORDER — HYDROXYCHLOROQUINE SULFATE 200 MG/1
400 TABLET, FILM COATED ORAL DAILY
Status: DISCONTINUED | OUTPATIENT
Start: 2018-04-06 | End: 2018-04-12

## 2018-04-05 RX ORDER — ONDANSETRON 8 MG/1
8 TABLET, ORALLY DISINTEGRATING ORAL EVERY 8 HOURS PRN
Status: DISCONTINUED | OUTPATIENT
Start: 2018-04-05 | End: 2018-04-11

## 2018-04-05 RX ORDER — DIAZEPAM 2 MG/1
2 TABLET ORAL NIGHTLY
Status: DISCONTINUED | OUTPATIENT
Start: 2018-04-05 | End: 2018-04-05

## 2018-04-05 RX ORDER — SODIUM CHLORIDE 0.9 % (FLUSH) 0.9 %
5 SYRINGE (ML) INJECTION
Status: DISCONTINUED | OUTPATIENT
Start: 2018-04-05 | End: 2018-05-02 | Stop reason: HOSPADM

## 2018-04-05 RX ORDER — GLUCAGON 1 MG
1 KIT INJECTION
Status: DISCONTINUED | OUTPATIENT
Start: 2018-04-05 | End: 2018-04-13

## 2018-04-05 RX ORDER — PANTOPRAZOLE SODIUM 40 MG/1
40 TABLET, DELAYED RELEASE ORAL DAILY
Status: DISCONTINUED | OUTPATIENT
Start: 2018-04-06 | End: 2018-04-11

## 2018-04-05 RX ORDER — GUAIFENESIN 100 MG/5ML
200 SOLUTION ORAL EVERY 4 HOURS PRN
Status: DISCONTINUED | OUTPATIENT
Start: 2018-04-05 | End: 2018-04-11

## 2018-04-05 RX ORDER — PROMETHAZINE HYDROCHLORIDE AND CODEINE PHOSPHATE 6.25; 1 MG/5ML; MG/5ML
5 SOLUTION ORAL NIGHTLY PRN
Status: DISCONTINUED | OUTPATIENT
Start: 2018-04-05 | End: 2018-04-11

## 2018-04-05 RX ORDER — AZITHROMYCIN 250 MG/1
250 TABLET, FILM COATED ORAL DAILY
Status: COMPLETED | OUTPATIENT
Start: 2018-04-06 | End: 2018-04-09

## 2018-04-05 RX ADMIN — AZITHROMYCIN MONOHYDRATE 500 MG: 500 INJECTION, POWDER, LYOPHILIZED, FOR SOLUTION INTRAVENOUS at 08:04

## 2018-04-05 RX ADMIN — CEFTRIAXONE SODIUM 1 G: 1 INJECTION, POWDER, FOR SOLUTION INTRAMUSCULAR; INTRAVENOUS at 07:04

## 2018-04-05 NOTE — ED PROVIDER NOTES
Encounter Date: 4/5/2018       History     Chief Complaint   Patient presents with    Cough     cough, congestion, intermittent SOB.  Saw PCP was given inhaler on Monday.  Symptoms continue     81yo female with RA, htn, on humira/prednisone here with worsening cough and dyspnea.  Pt seen by pcp - had labs, cxr, and started on an inhaler.  However, despite this, she is feeling worse.  She first developed cough, congestion and mild dyspnea on Saturday, much worse on Sunday.  Through the week it has continued to gradually worsen.  She does endorse mild chest heaviness today.  States stable edema in both feet.  No fevers.  No hx cad/chf or pulm dz per pt.  Given worsening sxs today, pmd advised she present for admission.           Review of patient's allergies indicates:  No Known Allergies  Past Medical History:   Diagnosis Date    Anemia     Arthritis     Hashimoto's disease     Hypertension     IGT (impaired glucose tolerance) 1/12/2016    Joint pain     Multinodular goiter 1/12/2016    Stroke      Past Surgical History:   Procedure Laterality Date    CATARACT EXTRACTION      COLONOSCOPY N/A 9/29/2015    Procedure: COLONOSCOPY;  Surgeon: FIDELINA Sanchez MD;  Location: [address];  Service: Endoscopy;  Laterality: N/A;    EYE SURGERY      JOINT REPLACEMENT      right knee    KNEE SURGERY Left 12/31/2013    TKR    left parotidectomy      mixed tumor    SALIVARY GLAND SURGERY      TONSILLECTOMY       Family History   Problem Relation Age of Onset    Hypertension Mother     Heart disease Mother     Prostate cancer Father      prostate cancer    Cancer Father     Breast cancer Maternal Grandmother     Lupus Neg Hx     Psoriasis Neg Hx     Melanoma Neg Hx     Colon cancer Neg Hx      Social History   Substance Use Topics    Smoking status: Never Smoker    Smokeless tobacco: Never Used    Alcohol use No      Comment: very seldom      Review of Systems   Constitutional: Positive for  fatigue. Negative for fever.   HENT: Negative for rhinorrhea and sore throat.    Eyes: Negative for discharge and visual disturbance.   Respiratory: Positive for cough and shortness of breath.    Cardiovascular: Positive for leg swelling. Negative for palpitations.   Gastrointestinal: Negative for abdominal pain, diarrhea and vomiting.   Genitourinary: Negative for dysuria and frequency.   Musculoskeletal: Negative for back pain.   Neurological: Negative for syncope and headaches.   Psychiatric/Behavioral: The patient is nervous/anxious.        Physical Exam     Initial Vitals [04/05/18 1622]   BP Pulse Resp Temp SpO2   132/65 100 -- -- (!) 91 %      MAP       87.33         Physical Exam    Constitutional: She appears well-developed and well-nourished. She is not diaphoretic. She appears distressed.   HENT:   Head: Normocephalic and atraumatic.   Right Ear: External ear normal.   Left Ear: External ear normal.   Mouth/Throat: Oropharynx is clear and moist.   Eyes: Conjunctivae are normal. Pupils are equal, round, and reactive to light. Right eye exhibits no discharge. Left eye exhibits no discharge. No scleral icterus.   Neck: Normal range of motion. Neck supple. No tracheal deviation present.   Cardiovascular: Normal rate, regular rhythm and intact distal pulses. Exam reveals no friction rub.    Pulmonary/Chest: No stridor. She is in respiratory distress. She has rhonchi. She has rales. She exhibits no tenderness.   Abdominal: Soft. Bowel sounds are normal. She exhibits no distension. There is no tenderness. There is no rebound.   Musculoskeletal: Normal range of motion. She exhibits edema. She exhibits no tenderness.   Mild pretibial edema ble, symmetric   Neurological: She is alert and oriented to person, place, and time. She has normal strength. No sensory deficit.   Skin: Skin is warm and dry. No rash noted. No erythema.   Psychiatric: She has a normal mood and affect.         ED Course   Procedures  Labs  Reviewed   CBC W/ AUTO DIFFERENTIAL - Abnormal; Notable for the following:        Result Value    WBC 23.59 (*)     Hemoglobin 10.3 (*)     Hematocrit 34.0 (*)     MCV 78 (*)     MCH 23.6 (*)     MCHC 30.3 (*)     RDW 15.0 (*)     Platelets 418 (*)     Gran # (ANC) 18.1 (*)     Immature Grans (Abs) 0.11 (*)     Mono # 3.8 (*)     Gran% 76.8 (*)     Lymph% 5.5 (*)     Mono% 16.0 (*)     Platelet Estimate Increased (*)     All other components within normal limits   B-TYPE NATRIURETIC PEPTIDE - Abnormal; Notable for the following:      (*)     All other components within normal limits   COMPREHENSIVE METABOLIC PANEL - Abnormal; Notable for the following:     Glucose 159 (*)     Albumin 2.5 (*)     Alkaline Phosphatase 142 (*)     AST 48 (*)     ALT 47 (*)     eGFR if non  53.3 (*)     All other components within normal limits   PROTIME-INR - Abnormal; Notable for the following:     Prothrombin Time 12.7 (*)     All other components within normal limits   CULTURE, BLOOD   CULTURE, BLOOD   CULTURE, URINE   MAGNESIUM   TROPONIN I   LACTIC ACID, PLASMA   URINALYSIS, REFLEX TO URINE CULTURE   PROCALCITONIN     EKG Readings: (Independently Interpreted)   Sinus, pvc's, no acute st elevation, nl axis       X-Rays:   Independently Interpreted Readings:   Other Readings:  Cxr:  Worsening infiltrate vs congestive changes.  No consolidation.    Medical Decision Making:   Initial Assessment:   Worsening cough/dyspnea, no cardiac hx, immunocompromised.  R/o pna, chf, sepsis, anemia, other.  Labs, ekg, cxr, oxygen, monitor, re-eval.      Noted to have newly elevated wbc - cultures and lactate added, will start empiric abx.  Is on chronic steroids but this elevation is atypical for her.      bnp minimally elevated from prior.  cxr w/ mild progression, no focal consolidation.  Lactate is normal.  The new leukocytosis favors infection rather than chf.  Pt updated on findings and is agreeable w/ plan to admit w/  iv abx, echo in a.m.  No new complaints, resting comfortably in bed (and laying near-supine w/o resp distress).  Case d/w hospital med for admission.                        Clinical Impression:   The primary encounter diagnosis was Pneumonia of both lungs due to infectious organism, unspecified part of lung. Diagnoses of Shortness of breath, Dyspnea, Cough, Long term (current) use of systemic steroids, and Hypertension, essential were also pertinent to this visit.                           Jered Howard MD  04/05/18 1955

## 2018-04-05 NOTE — PROVIDER PROGRESS NOTES - EMERGENCY DEPT.
Encounter Date: 4/5/2018    ED Physician Progress Notes           Sinus rhythm with 2 beat run of PVCs.  No STEMI

## 2018-04-05 NOTE — ED NOTES
Patient identifiers verified and correct for .   LOC: The patient is awake, alert and aware of environment with an appropriate affect, the patient is oriented x 3 and speaking appropriately.   APPEARANCE: Patient appears comfortable and in no acute distress, patient is clean and well groomed.  SKIN: The skin is warm and dry, color consistent with ethnicity, patient has normal skin turgor and moist mucus membranes, skin intact, no breakdown or bruising noted.   MUSCULOSKELETAL: Patient moving all extremities spontaneously, no swelling noted.  RESPIRATORY: Airway is open and patent, respirations are spontaneous, patient's breaths are shallow, slight accessory muscle use noted, pt placed on continuous pulse ox with O2 sats noted at 97% on 2L NC--productive cough reported with SOB--denies fever--speaking in short sentences  CARDIAC: Pt placed on cardiac monitor. Patient has a normal rate and regular rhythm, no edema noted, capillary refill < 3 seconds.   GASTRO: Soft and non tender to palpation, no distention noted, normoactive bowel sounds present in all four quadrants. Pt states bowel movements have been regular.  : Pt denies any pain or frequency with urination.  NEURO: Pt opens eyes spontaneously, behavior appropriate to situation, follows commands, facial expression symmetrical, bilateral hand grasp equal and even, purposeful motor response noted, normal sensation in all extremities when touched with a finger.

## 2018-04-05 NOTE — TELEPHONE ENCOUNTER
Patient called through triage nurse and c/o increasing dyspnea and coughing. We asked the nurse to triage her, she did not want to go to Ed, but she wanted to talk to me, so I got on the line. She said she was worse today and coughing steadily with dyspnea. She seemed to indicate that it worsened today or last night. I went over her tests again and she said she did not understand the issue of the infiltrates we discussed on 4/2. I explained that the CXR was concerning, but her BNP was only mildly elevated. The fact that she is worsening I highly recommended ER for consideration of admit. She became angry and indicated that she thought we could see her here or admit direct - I explained that the Er would be the safest way to evaluate and that direct admit could take a while, also that if she came to our clinic, that we could evaluate, but likely send her across the street. She stated that she did not have anyone with her and I asked if I could call her sister and she ok'd and I did. Sister will go take her to ER and I recommended main campus. I gave her my direct contact information. She said Dr. 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    Of note - tried to call results last evening and no answer.

## 2018-04-06 ENCOUNTER — TELEPHONE (OUTPATIENT)
Dept: INTERNAL MEDICINE | Facility: CLINIC | Age: 83
End: 2018-04-06

## 2018-04-06 LAB
ALBUMIN SERPL BCP-MCNC: 2.1 G/DL
ALP SERPL-CCNC: 116 U/L
ALT SERPL W/O P-5'-P-CCNC: 36 U/L
ANION GAP SERPL CALC-SCNC: 9 MMOL/L
ANISOCYTOSIS BLD QL SMEAR: SLIGHT
AST SERPL-CCNC: 24 U/L
BACTERIA UR CULT: NO GROWTH
BASOPHILS # BLD AUTO: 0.05 K/UL
BASOPHILS NFR BLD: 0.2 %
BILIRUB SERPL-MCNC: 0.7 MG/DL
BUN SERPL-MCNC: 10 MG/DL
CALCIUM SERPL-MCNC: 8.3 MG/DL
CHLORIDE SERPL-SCNC: 103 MMOL/L
CO2 SERPL-SCNC: 24 MMOL/L
CREAT SERPL-MCNC: 0.8 MG/DL
DIFFERENTIAL METHOD: ABNORMAL
EOSINOPHIL # BLD AUTO: 0.5 K/UL
EOSINOPHIL NFR BLD: 2.1 %
ERYTHROCYTE [DISTWIDTH] IN BLOOD BY AUTOMATED COUNT: 15.1 %
EST. GFR  (AFRICAN AMERICAN): >60 ML/MIN/1.73 M^2
EST. GFR  (NON AFRICAN AMERICAN): >60 ML/MIN/1.73 M^2
GLUCOSE SERPL-MCNC: 83 MG/DL
HCT VFR BLD AUTO: 30.9 %
HGB BLD-MCNC: 9.6 G/DL
IMM GRANULOCYTES # BLD AUTO: 0.12 K/UL
IMM GRANULOCYTES NFR BLD AUTO: 0.5 %
LYMPHOCYTES # BLD AUTO: 1.8 K/UL
LYMPHOCYTES NFR BLD: 7.5 %
MAGNESIUM SERPL-MCNC: 1.5 MG/DL
MCH RBC QN AUTO: 24 PG
MCHC RBC AUTO-ENTMCNC: 31.1 G/DL
MCV RBC AUTO: 77 FL
MONOCYTES # BLD AUTO: 4.5 K/UL
MONOCYTES NFR BLD: 18.6 %
NEUTROPHILS # BLD AUTO: 17.2 K/UL
NEUTROPHILS NFR BLD: 71.1 %
NRBC BLD-RTO: 0 /100 WBC
OVALOCYTES BLD QL SMEAR: ABNORMAL
PHOSPHATE SERPL-MCNC: 2.7 MG/DL
PLATELET # BLD AUTO: 360 K/UL
PMV BLD AUTO: 10 FL
POIKILOCYTOSIS BLD QL SMEAR: SLIGHT
POLYCHROMASIA BLD QL SMEAR: ABNORMAL
POTASSIUM SERPL-SCNC: 3.6 MMOL/L
PROT SERPL-MCNC: 6.2 G/DL
RBC # BLD AUTO: 4 M/UL
SODIUM SERPL-SCNC: 136 MMOL/L
WBC # BLD AUTO: 24.21 K/UL

## 2018-04-06 PROCEDURE — 25000003 PHARM REV CODE 250: Performed by: HOSPITALIST

## 2018-04-06 PROCEDURE — 25000242 PHARM REV CODE 250 ALT 637 W/ HCPCS: Performed by: HOSPITALIST

## 2018-04-06 PROCEDURE — 97535 SELF CARE MNGMENT TRAINING: CPT

## 2018-04-06 PROCEDURE — 11000001 HC ACUTE MED/SURG PRIVATE ROOM

## 2018-04-06 PROCEDURE — 83735 ASSAY OF MAGNESIUM: CPT

## 2018-04-06 PROCEDURE — 80053 COMPREHEN METABOLIC PANEL: CPT

## 2018-04-06 PROCEDURE — 97530 THERAPEUTIC ACTIVITIES: CPT

## 2018-04-06 PROCEDURE — 63600175 PHARM REV CODE 636 W HCPCS: Performed by: HOSPITALIST

## 2018-04-06 PROCEDURE — 94640 AIRWAY INHALATION TREATMENT: CPT

## 2018-04-06 PROCEDURE — 97165 OT EVAL LOW COMPLEX 30 MIN: CPT

## 2018-04-06 PROCEDURE — 36415 COLL VENOUS BLD VENIPUNCTURE: CPT

## 2018-04-06 PROCEDURE — 25000003 PHARM REV CODE 250: Performed by: PHYSICIAN ASSISTANT

## 2018-04-06 PROCEDURE — 94761 N-INVAS EAR/PLS OXIMETRY MLT: CPT

## 2018-04-06 PROCEDURE — 99232 SBSQ HOSP IP/OBS MODERATE 35: CPT | Mod: ,,, | Performed by: HOSPITALIST

## 2018-04-06 PROCEDURE — 97161 PT EVAL LOW COMPLEX 20 MIN: CPT

## 2018-04-06 PROCEDURE — 27000221 HC OXYGEN, UP TO 24 HOURS

## 2018-04-06 PROCEDURE — 85025 COMPLETE CBC W/AUTO DIFF WBC: CPT

## 2018-04-06 PROCEDURE — 84100 ASSAY OF PHOSPHORUS: CPT

## 2018-04-06 RX ORDER — FUROSEMIDE 10 MG/ML
20 INJECTION INTRAMUSCULAR; INTRAVENOUS ONCE
Status: COMPLETED | OUTPATIENT
Start: 2018-04-06 | End: 2018-04-06

## 2018-04-06 RX ORDER — DIAZEPAM 2 MG/1
2 TABLET ORAL ONCE
Status: COMPLETED | OUTPATIENT
Start: 2018-04-06 | End: 2018-04-06

## 2018-04-06 RX ORDER — PREDNISONE 20 MG/1
40 TABLET ORAL DAILY
Status: DISCONTINUED | OUTPATIENT
Start: 2018-04-06 | End: 2018-04-07

## 2018-04-06 RX ADMIN — CLOPIDOGREL 75 MG: 75 TABLET, FILM COATED ORAL at 09:04

## 2018-04-06 RX ADMIN — DIAZEPAM 2 MG: 2 TABLET ORAL at 03:04

## 2018-04-06 RX ADMIN — BACLOFEN 10 MG: 10 TABLET ORAL at 02:04

## 2018-04-06 RX ADMIN — HYDROXYCHLOROQUINE SULFATE 400 MG: 200 TABLET, FILM COATED ORAL at 08:04

## 2018-04-06 RX ADMIN — IPRATROPIUM BROMIDE AND ALBUTEROL SULFATE 3 ML: .5; 3 SOLUTION RESPIRATORY (INHALATION) at 04:04

## 2018-04-06 RX ADMIN — IPRATROPIUM BROMIDE AND ALBUTEROL SULFATE 3 ML: .5; 3 SOLUTION RESPIRATORY (INHALATION) at 11:04

## 2018-04-06 RX ADMIN — AMLODIPINE BESYLATE 5 MG: 5 TABLET ORAL at 08:04

## 2018-04-06 RX ADMIN — MONTELUKAST SODIUM 10 MG: 10 TABLET, FILM COATED ORAL at 08:04

## 2018-04-06 RX ADMIN — PREDNISONE 5 MG: 5 TABLET ORAL at 08:04

## 2018-04-06 RX ADMIN — BACLOFEN 10 MG: 10 TABLET ORAL at 09:04

## 2018-04-06 RX ADMIN — PANTOPRAZOLE SODIUM 40 MG: 40 TABLET, DELAYED RELEASE ORAL at 08:04

## 2018-04-06 RX ADMIN — IPRATROPIUM BROMIDE AND ALBUTEROL SULFATE 3 ML: .5; 3 SOLUTION RESPIRATORY (INHALATION) at 07:04

## 2018-04-06 RX ADMIN — AZITHROMYCIN 250 MG: 250 TABLET, FILM COATED ORAL at 08:04

## 2018-04-06 RX ADMIN — GABAPENTIN 300 MG: 300 CAPSULE ORAL at 02:04

## 2018-04-06 RX ADMIN — ACETAMINOPHEN 650 MG: 325 TABLET ORAL at 03:04

## 2018-04-06 RX ADMIN — CEFTRIAXONE SODIUM 1 G: 1 INJECTION, POWDER, FOR SOLUTION INTRAMUSCULAR; INTRAVENOUS at 09:04

## 2018-04-06 RX ADMIN — PREDNISONE 40 MG: 20 TABLET ORAL at 05:04

## 2018-04-06 RX ADMIN — FUROSEMIDE 20 MG: 10 INJECTION, SOLUTION INTRAMUSCULAR; INTRAVENOUS at 05:04

## 2018-04-06 RX ADMIN — BACLOFEN 10 MG: 10 TABLET ORAL at 08:04

## 2018-04-06 RX ADMIN — GABAPENTIN 300 MG: 300 CAPSULE ORAL at 09:04

## 2018-04-06 RX ADMIN — GABAPENTIN 300 MG: 300 CAPSULE ORAL at 08:04

## 2018-04-06 NOTE — PLAN OF CARE
Problem: Occupational Therapy Goal  Goal: Occupational Therapy Goal  Goals to be met by: 4/30/18     Patient will increase functional independence with ADLs by performing:    UE Dressing with Supervision.  LE Dressing with Stand-by Assistance.  Grooming while standing at sink with Stand-by Assistance.  Toileting from toilet with Supervision for hygiene and clothing management.   Supine to sit with Modified [place].  Stand pivot transfers with Stand-by Assistance.    Outcome: Ongoing (interventions implemented as appropriate)  OT eval completed. The above goals are established to improve functional (I) and mobility.    AYAD Montana  4/6/2018  Pager: [phone]

## 2018-04-06 NOTE — HOSPITAL COURSE
Admitted to MICU on 4/11, intubated emergently for severe hypoxemia and respiratory distress. ID was consulted for assistance in management given immunocompromised state. Patient paralyzed with nimbex following persistent dysynchrony with vent despite sedation. Abx broadened to vancomycin, zosyn, bactrim, and posaconazole. Norepinephrine initiated for BP support. Bronchoscopy performed at bedside on 4/11 and all cultures including fungal cultures negative. Nimbex discontinued on 4/12. She continued to require significant oxygen support thereafter, and was unable to wean oxygen requirements significantly since intubation. However, she has made slow improvements in her oxygenation since 4/18. She went into A-fib RVR in 120-160's on the evening of 4/17, and was briefly started on amiodarone gtt; this discontinued and metoprolol added with good rate control on 4/18. SAT/SBT continue daily with difficulty weaning her off fentanyl as she gets agitated tachycardic and hypertensive ('s). Valium initiated in effort to wean fentanyl off. Patient extubated on 4/22 to nasal cannula  4/24/2018 - Pt with chills/cold overnight & refusing meds, willing to take meds this morning. Hill placed this AM with good UOP. Pt still NPO per swallowing trial. Free water deficit 2.8L, starting with 500mL D5, will reassess, Pt oriented x3 this morning. Re-evaluate swallowing.  04/25/2018: Stepped down to hospital medicine. No acute events overnight, patient to have swallow evaluation today. Respiratory status stable, on 2L NC.   04/27/2018: No acute events overnight, patient continues to fail swallow studies. Will place NG tube today for nutrition and consult placed for general surgery for PEG placement.   04/28/2018: NG placed yesterday and now receiving tube feeds. Plan for PEG placement on Monday per interventional radiology. No acute events overnight.   04/29/2018: Patient removed NG yesterday afternoon, does not want it replaced.  Plan for PEG placement tomorrow. No acute events overnight.   04/30/2018: Patient refused placement of NG tube required for PEG placement overnight, spoke to IR and attempting to have to replaced so that procedure can be performed (required for barium swallow). Otherwise no acute events overnight.   5/1/2018; PEG placed on 4/30, tolerated well. Using PEG for TFs this PM, meds via PEG. Pending transition to SNF on 5/2

## 2018-04-06 NOTE — HPI
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 She was seen by her PCP on 4/2 for these complaints and received a prescription for duo nebs and tessalon perles. CXR performed on 4/2 noted bilateral basilar infiltrates. At that time, she denied fever, chills, sputum production, sick contacts. She was admitted to  and treated emprically for CAP with rocephin and azithromycin. She was also diuresed daily. However, her oxygen requirements slowly increased from 2 L NC to 4 L NC with also progressively worsening bilateral infiltrates on imaging. CTA performed on 4/9 negative for PE, however noted significant bilateral consolidations. On 4/11, her hypoxemia continued to worsen, requiring NRB mask, and she developed severe respiratory distress. She was transferred to the MICU and emergently intubated. Repeat CT chest performed on 4/11 demonstrated progressive worsening of bilateral peripheral infiltrates.

## 2018-04-06 NOTE — PLAN OF CARE
Pending therapy notes/eval. Per pt, she worked w them this am and they have started to write notes  PCP Rosa arroyo made. Pt would like [facility] if needed. GRANT Hahn notified of preference.  Future Appointments  Date Time Provider Department Center   4/10/2018 1:00 PM Demond Wolf MD [facility] CARDIO [address]   4/17/2018 2:00 PM VIRGILIO Guerrier, NP [facility] OBGYNF [address]   4/19/2018 9:00 AM [facility]   5/8/2018 9:15 AM Jessica Thacker MD [facility]   5/10/2018 4:00 PM Bhargav Lee MD [facility] IM [address] PCW   5/17/2018 2:20 PM Baldomero Giang MD [facility] NEURO [address]   6/22/2018 3:40 PM Jacoby Prakash MD [facility] RHEUM [address]       Future Appointments  Date Time Provider Department Center   4/10/2018 1:00 PM Demond Wolf MD [facility] CARDIO [address]   4/17/2018 9:40 AM Bhargav Lee MD [facility] IM [address] PCW   4/17/2018 2:00 PM [facility]rier[facility]   4/19/2018 9:00 AM Dee [facility]   5/8/2018 9:15 AM [facility]   5/10/2018 4:00 PM Bhargav Lee MD [facility] IM [address] PCW   5/17/2018 2:20 PM Baldomero Giang MD [facility] NEURO [address]   6/22/2018 3:40 PM Jacoby Prakash MD [facility] RHEUM [address]          04/06/18 1016   Discharge Assessment   Assessment Type Discharge Planning Assessment   Confirmed/corrected address and phone number on facesheet? Yes   Assessment information obtained from? Patient   Expected Length of Stay (days) 3   Communicated expected length of stay with patient/caregiver yes   Prior to hospitilization cognitive status: Alert/Oriented   Prior to hospitalization functional status: Independent;Assistive Equipment   Current cognitive status: Alert/Oriented   Current Functional Status: Independent;Assistive Equipment   Lives With alone   Able to Return to Prior Arrangements yes   Is patient able to care for self after  discharge? Yes   Who are your caregiver(s) and their phone number(s)? her sister can provide her w ride home if needed    [phone]   Patient's perception of discharge disposition home or selfcare   Readmission Within The Last 30 Days no previous admission in last 30 days   Patient currently being followed by outpatient case management? No   Patient currently receives any other outside agency services? No   Equipment Currently Used at Home cane, straight;walker, rolling;bedside commode   Do you have any problems affording any of your prescribed medications? No   Is the patient taking medications as prescribed? yes   Does the patient have transportation home? Yes   Does the patient receive services at the [facility]? No   Discharge Plan A Home with family;Home Health

## 2018-04-06 NOTE — PROGRESS NOTES
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  She mentions that for 2 weeks, she has been having a dry cough.  She endorses worsening SOB and fatigue.  She went to see her PCP who started her on an inhaler.  She was given Tessalon Perles which have helped her cough.  She doesn't wear oxygen at home.  She denies any sick contacts, chest pain, palpitations.  She denies any fevers or chills.  She claims to be compliant with her medications.  She didn't receive the pneumonia vaccine.    Hospital Course:  Dr Lux was admitted on 4/5/18 for respiratory failure concerning for CAP and COPD exacerbation vs CHF exacerbation.     Interval History: Admitted overnight for resp failure. Report LE swelling. On 2L NC    Review of Systems   Constitutional: Negative for chills, fatigue, fever.   HENT: Negative for sore throat, trouble swallowing.    Eyes: Negative for photophobia, visual disturbance.   Respiratory: + cough, shortness of breath.    Cardiovascular: Negative for chest pain, palpitations, leg swelling.   Gastrointestinal: Negative for abdominal pain, constipation, diarrhea, nausea, vomiting.   Endocrine: Negative for cold intolerance, heat intolerance.   Genitourinary: Negative for dysuria, frequency.   Musculoskeletal: Negative for arthralgias, myalgias.   Skin: Negative for rash, wound, + erythema   Neurological: Negative for dizziness, syncope, weakness, light-headedness.   Psychiatric/Behavioral: Negative for  confusion, hallucinations, anxiety  All other systems reviewed and are negative.    Objective:     Vital Signs (Most Recent):  Temp: 99.9 °F (37.7 °C) (04/06/18 1142)  Pulse: 95 (04/06/18 1506)  Resp: 18 (04/06/18 1142)  BP: (!) 125/58 (04/06/18 1142)  SpO2: (!) 92 % (04/06/18 1142) Vital Signs (24h Range):  Temp:  [98.6 °F (37 °C)-100.5 °F (38.1 °C)] 99.9 °F (37.7 °C)  Pulse:  [] 95  Resp:  [17-28] 18  SpO2:  [91 %-98 %] 92 %  BP: (121-153)/(57-94) 125/58     Weight: 68 kg (150 lb)  Body mass index is 24.96 kg/m².    Intake/Output Summary (Last 24 hours) at 04/06/18 1541  Last data filed at 04/06/18 0200   Gross per 24 hour   Intake              250 ml   Output              200 ml   Net               50 ml      Physical Exam  Constitutional: Appears well-developed and well-nourished.   Head: Normocephalic and atraumatic.   Mouth/Throat: Oropharynx is clear and moist.   Eyes: EOM are normal. Pupils are equal, round, and reactive to light. No scleral icterus.   Neck: Normal range of motion. Neck supple.   Cardiovascular: Normal rate and regular rhythm.  No murmur heard.  Pulmonary/Chest: On nasal cannula.  Rales in the LLL  Abdominal: Soft. Bowel sounds are normal.  No distension or tenderness  Musculoskeletal: Normal range of motion. Mild 1+ edema BLE  Neurological: Alert and oriented to person, place, and time.   Skin: Skin is warm and dry.   Psychiatric: Normal mood and affect. Behavior is normal.    MELD-Na score: 8 at 4/6/2018  3:41 AM  MELD score: 8 at 4/6/2018  3:41 AM  Calculated from:  Serum Creatinine: 0.8 mg/dL (Rounded to 1) at 4/6/2018  3:41 AM  Serum Sodium: 136 mmol/L at 4/6/2018  3:41 AM  Total Bilirubin: 0.7 mg/dL (Rounded to 1) at 4/6/2018  3:41 AM  INR(ratio): 1.2 at 4/5/2018  5:15 PM  Age: 82 years    Significant Labs:  CBC:    Recent Labs  Lab 04/05/18 1715 04/06/18  0341   WBC 23.59* 24.21*   HGB 10.3* 9.6*   HCT 34.0* 30.9*   * 360*     CMP:    Recent Labs  Lab 04/05/18 1715  04/06/18  0341    136   K 4.0 3.6    103   CO2 25 24   * 83   BUN 13 10   CREATININE 1.0 0.8   CALCIUM 8.7 8.3*   PROT 7.2 6.2   ALBUMIN 2.5* 2.1*   BILITOT 0.9 0.7   ALKPHOS 142* 116   AST 48* 24   ALT 47* 36   ANIONGAP 8 9   EGFRNONAA 53.3* >60.0     PTINR:    Recent Labs  Lab 04/05/18  1715   INR 1.2       Significant Procedures:   Dobutamine Stress Test with Color Flow: No results found for this or any previous visit.         Assessment/Plan:      * Acute respiratory failure with hypoxia    - PNA combined with either CHF or COPD exacerbation  - PNA (as below)  - CHF exacerbation: pt with elevated BNP, LE swelling, last ECHO showed normal EF but diastolic dysfunction, 1x dose of IV lasix  - COPD exacerbation: increase steroid to 40mg QD for now, nebs, spiriva   - On 2L NC at this time, Continue to wean        Pneumonia due to Streptococcus pneumoniae    - 3/4 SIRS criteria:  HR >90, RR >20, WBC >12k in ED, Satting 92% on 2L NC  - Suspect lung as the cause (seen on CXR)  - Duonebs q4h while awake and prn  - Guaifenesin and Codeine cough syrup prn cough and Tesslon Perles  - Refused Pneumonia vaccine previously  - Continue Ceftriaxone 1g IV daily, Azithromycin 250mg PO daily            Seropositive rheumatoid arthritis of multiple sites    - Chronic and stable  - Continue Plaquenil 400mg PO daily  - Increase PO prednisone for now, will resume Prednisone 5mg PO daily soon          Cervical dystonia    - Chronic and stable  - Continue Gabapentin 300mg PO TID  - Continue Baclofen 10mg PO TID          Cerebrovascular accident (CVA)    - Loves at home by herself  - PT/OT consulted: SNF          Vascular dementia    - Chronic and stable  - Continue Plavix 75mg PO daily             Hypertension, essential    - Chronic and stable  - Continue Norvasc 5mg PO daily             Fatty liver    - Chronic  - LFTs more elevated then normal  - Continue to monitor            VTE Risk Mitigation         Ordered      IP VTE HIGH RISK PATIENT  Once      04/05/18 2313     Place DIMPLE hose  Until discontinued      04/05/18 2210              Adama Bacon MD  Department of Hospital Medicine   [facility]

## 2018-04-06 NOTE — PLAN OF CARE
Problem: Physical Therapy Goal  Goal: Physical Therapy Goal  Goals to be met by: 18     Patient will increase functional independence with mobility by performin. Supine to sit with Stand-by Assistance  2. Sit to supine with Stand-by Assistance  3. Sit to stand transfer with Stand-by Assistance  4. Bed to chair transfer with Stand-by Assistance using LRAD  5. Gait  x 50 feet with Contact Guard Assistance using LRAD    6. Ascend/descend 5 stair with bilateral Handrails Contact Guard Assistance using Single-point Cane .   7. Lower extremity exercise program x20 reps per handout, with supervision    Outcome: Ongoing (interventions implemented as appropriate)  PT Goals established following initial kristine Pierre, PT  2018

## 2018-04-06 NOTE — NURSING
MD Nely notified of pt's medication regime off from her norm. Pt says she takes singulair and valium with morning meds and plavix with evening meds. Pt denied evening dose of valium and singulair.

## 2018-04-06 NOTE — ED NOTES
Pt resting with siderails up--resp shallow yet unlabored--denies pain at present--abx infusing--waiting for admission

## 2018-04-06 NOTE — PLAN OF CARE
Problem: Patient Care Overview  Goal: Plan of Care Review  Outcome: Ongoing (interventions implemented as appropriate)  POC reviewed with patient. Pt verbalized understanding. Questions and concerns addressed.   Some anxiety observed in the patient. A one time does of valium given.  Pt has productive cough. SOB noted with exertion.  No acute events overnight. Pt progressing toward goals.   Will continue to monitor. See flow sheets for full assessment and VS info

## 2018-04-06 NOTE — NURSING TRANSFER
Nursing Transfer Note      4/5/2018     Transfer To: 909    Transfer via stretcher    Transfer with 2L NC to O2, cardiac monitoring    Transported by transport tech    Medicines sent: none    Chart send with patient: Yes    Notified: sister    Patient reassessed at: 2310, 4/5/18     Upon arrival to floor: cardiac monitor applied, patient oriented to room, call bell in reach and bed in lowest position      MD notified of pt's arrival to room. Pt comfortable, but appears SOB. VSS.

## 2018-04-06 NOTE — TELEPHONE ENCOUNTER
----- Message from Bess Hare sent at 4/6/2018  7:37 AM CDT -----  Contact: Patient   Called to inform you that she is in the hospital at [facility] in Room 909.    Thank You

## 2018-04-06 NOTE — PT/OT/SLP EVAL
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  She presents with the following impairments/functional limitations:  impaired endurance, weakness, impaired self care skills, impaired functional mobilty, gait instability, impaired cardiopulmonary response to activity, decreased upper extremity function, decreased ROM, decreased lower extremity function. Patient presents w/ decreased functional mobility, SOB during activity, and generalized weakness. Patient lives alone but has a home health aide 5x/week and family assistance prn. Patient eval very limited 2/2 patient being SOB following self ambulation to bathroom w/ no supplemental O2. Patient O2 stats were at 91% on room air. Patient will benefit from skilled PT services to address her listed impairments and help improve her functional mobility.    Rehab Prognosis:  Fair; patient would benefit from acute skilled PT services to address these deficits and reach maximum level of function.      Recent Surgery: * No surgery found *      Plan:     During this hospitalization, patient to be seen 4 x/week to address the above listed problems via therapeutic activities, therapeutic exercises, gait training, neuromuscular re-education  · Plan of Care Expires:  05/06/18   Plan of Care Reviewed with: patient    Subjective     Communicated with nurse prior to session.  Patient found sitting in bedside recliner upon PT entry to room, agreeable to evaluation.      Chief Complaint: Decreased functional mobility, SOB during activity, Decreased self care skills  Patient  comments/goals: to get better  Pain/Comfort:  · Pain Rating 1: 0/10    Patients cultural, spiritual, Restorationism conflicts given the current situation: none stated    Living Environment:  Patient lives alone in a single story house, 5STE w/ BHR.   Prior to admission, patients level of function was requiring assistance w/ ADLs, able to ambulate w/ SPC.  Patient has the following equipment: cane, straight, walker, rolling, bedside commode (patient only uses the SPC).  Upon discharge, patient will have assistance from family.    Objective:     Patient found with: telemetry, oxygen     General Precautions: Standard,     Orthopedic Precautions:N/A   Braces: N/A     Exams:  · Cognitive Exam:  Patient is oriented to Person, Place, Time and Situation and follows 100% of 2 step commands   · Fine Motor Coordination:    · -       Intact  · Gross Motor Coordination:  WFL  · Postural Exam:  Patient presented with the following abnormalities:    · -       Rounded shoulders  · -       Kyphosis  · Sensation:    · -       Intact  · RLE ROM: WFL  · RLE Strength: WFL  · LLE ROM: WFL  · LLE Strength: WFL    Functional Mobility:  · Transfers:  Sit to Stand:  contact guard assistance with straight cane  · Gait: 20' in room  · CGA w/ SPC, decreased step length and increased use of furniture in room for extra stability    AM-PAC 6 CLICK MOBILITY  Total Score:17       Therapeutic Activities and Exercises:   PT Eval completed  Patient educated on dc recs and seated therex   LAQ, Hip Flex, Ankle Pumps    Patient left up in chair with all lines intact, call button in reach and nurse notified.    GOALS:    Physical Therapy Goals        Problem: Physical Therapy Goal    Goal Priority Disciplines Outcome Goal Variances Interventions   Physical Therapy Goal     PT/OT, PT Ongoing (interventions implemented as appropriate)     Description:  Goals to be met by: 4/16/18     Patient will increase functional independence with mobility by  performin. Supine to sit with Stand-by Assistance  2. Sit to supine with Stand-by Assistance  3. Sit to stand transfer with Stand-by Assistance  4. Bed to chair transfer with Stand-by Assistance using LRAD  5. Gait  x 50 feet with Contact Guard Assistance using LRAD    6. Ascend/descend 5 stair with bilateral Handrails Contact Guard Assistance using Single-point Cane .   7. Lower extremity exercise program x20 reps per handout, with supervision                      History:     Past Medical History:   Diagnosis Date    Anemia     Arthritis     Hashimoto's disease     Hypertension     IGT (impaired glucose tolerance) 2016    Joint pain     Multinodular goiter 2016    Stroke        Past Surgical History:   Procedure Laterality Date    CATARACT EXTRACTION      COLONOSCOPY N/A 2015    Procedure: COLONOSCOPY;  Surgeon: FIDELINA Sanchez MD;  Location: [address];  Service: Endoscopy;  Laterality: N/A;    EYE SURGERY      JOINT REPLACEMENT      right knee    KNEE SURGERY Left 2013    TKR    left parotidectomy      mixed tumor    SALIVARY GLAND SURGERY      TONSILLECTOMY         Clinical Decision Making:     History  Co-morbidities and personal factors that may impact the plan of care Examination  Body Structures and Functions, activity limitations and participation restrictions that may impact the plan of care Clinical Presentation   Decision Making/ Complexity Score   Co-morbidities:   [] Time since onset of injury / illness / exacerbation  [] Status of current condition  []Patient's cognitive status and safety concerns    [] Multiple Medical Problems (see med hx)  Personal Factors:   [] Patient's age  [] Prior Level of function   [] Patient's home situation (environment and family support)  [] Patient's level of motivation  [] Expected progression of patient      HISTORY:(criteria)    [] 07304 - no personal factors/history    [] 68012 - has 1-2 personal  factor/comorbidity     [] 85886 - has >3 personal factor/comorbidity     Body Regions:  [] Objective examination findings  [] Head     []  Neck  [] Trunk   [] Upper Extremity  [] Lower Extremity    Body Systems:  [] For communication ability, affect, cognition, language, and learning style: the assessment of the ability to make needs known, consciousness, orientation (person, place, and time), expected emotional /behavioral responses, and learning preferences (eg, learning barriers, education  needs)  [] For the neuromuscular system: a general assessment of gross coordinated movement (eg, balance, gait, locomotion, transfers, and transitions) and motor function  (motor control and motor learning)  [] For the musculoskeletal system: the assessment of gross symmetry, gross range of motion, gross strength, height, and weight  [] For the integumentary system: the assessment of pliability(texture), presence of scar formation, skin color, and skin integrity  [] For cardiovascular/pulmonary system: the assessment of heart rate, respiratory rate, blood pressure, and edema     Activity limitations:    [] Patient's cognitive status and saf ety concerns          [] Status of current condition      [] Weight bearing restriction  [] Cardiopulmunary Restriction    Participation Restrictions:   [] Goals and goal agreement with the patient     [] Rehab potential (prognosis) and probable outcome      Examination of Body System: (criteria)    [] 23621 - addressing 1-2 elements    [] 19875 - addressing a total of 3 or more elements     [] 55510 -  Addressing a total of 4 or more elements         Clinical Presentation: (criteria)  Choose one     On examination of body system using standardized tests and measures patient presents with (CHOOSE ONE) elements from any of the following: body structures and functions, activity limitations, and/or participation restrictions.  Leading to a clinical presentation that is considered (CHOOSE  ONE)                              Clinical Decision Making  (Eval Complexity):  Low- 89086     Time Tracking:     PT Received On: 04/06/18  PT Start Time: 0846     PT Stop Time: 0858  PT Total Time (min): 12 min +10min upon PT returning to room     Billable Minutes: Evaluation 10 Min  and Therapeutic Activity 8 Min      Edgar Pierre, PT  04/06/2018

## 2018-04-06 NOTE — PLAN OF CARE
Therapist's notes recommend  Discharge Recommendations:  nursing facility, skilled (short stay)   Discharge Equipment Recommendations:  (TBD pending progress)     Cm spoke w pt and she's agreeable to [facility]. Cm will place referral/cx

## 2018-04-06 NOTE — H&P
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 female with RA on Prednisone and Plaquenil who presents to the ED with SOB and cough.  She mentions that for 2 weeks, she has been having a dry cough.  She endorses worsening SOB and fatigue.  She went to see her PCP who started her on an inhaler.  She was given Tessalon Perles which have helped her cough.  She doesn't wear oxygen at home.  She denies any sick contacts, chest pain, palpitations.  She denies any fevers or chills.  She claims to be compliant with her medications.  She didn't receive the pneumonia vaccine.      Review of Systems:  Constitutional: Negative for chills, fatigue, fever.   HENT: Negative for sore throat, trouble swallowing.    Eyes: Negative for photophobia, visual disturbance.   Respiratory: + cough, shortness of breath.    Cardiovascular: Negative for chest pain, palpitations, leg swelling.   Gastrointestinal: Negative for abdominal pain, constipation, diarrhea, nausea, vomiting.   Endocrine: Negative for cold intolerance, heat intolerance.   Genitourinary: Negative for dysuria, frequency.   Musculoskeletal: Negative for arthralgias, myalgias.   Skin: Negative for rash, wound, erythema   Neurological: Negative for dizziness, syncope, weakness, light-headedness.   Psychiatric/Behavioral: Negative for confusion, hallucinations, anxiety  All other systems reviewed and are negative.      Past Medical History: Patient has a past medical history of Anemia; Arthritis; Hashimoto's disease; Hypertension; IGT (impaired glucose tolerance) (1/12/2016); Joint pain; Multinodular goiter (1/12/2016); and Stroke.    Past Surgical  History: Patient has a past surgical history that includes Salivary gland surgery; Tonsillectomy; left parotidectomy; Cataract extraction; Colonoscopy (N/A, 9/29/2015); Joint replacement; Knee surgery (Left, 12/31/2013); and Eye surgery.    Social History: Patient reports that she has never smoked. She has never used smokeless tobacco. She reports that she does not drink alcohol or use drugs.    Family History: family history includes Breast cancer in her maternal grandmother; Cancer in her father; Heart disease in her mother; Hypertension in her mother; Prostate cancer in her father.    Medications: Scheduled Meds:   [START ON 4/6/2018] albuterol-ipratropium 2.5mg-0.5mg/3mL  3 mL Nebulization Q4H WAKE    [START ON 4/6/2018] amLODIPine  5 mg Oral Daily    [START ON 4/6/2018] baclofen  10 mg Oral TID    [START ON 4/6/2018] clopidogrel  75 mg Oral Daily    diazePAM  2 mg Oral QHS    [START ON 4/6/2018] gabapentin  300 mg Oral TID    [START ON 4/6/2018] hydroxychloroquine  400 mg Oral Daily    montelukast  10 mg Oral QHS    [START ON 4/6/2018] pantoprazole  40 mg Oral Daily    [START ON 4/6/2018] predniSONE  5 mg Oral Daily     Continuous Infusions:  PRN Meds:.acetaminophen, albuterol-ipratropium 2.5mg-0.5mg/3mL, benzonatate, dextrose 50%, dextrose 50%, glucagon (human recombinant), glucose, glucose, guaifenesin 100 mg/5 ml, ondansetron, promethazine-codeine 6.25-10 mg/5 ml, ramelteon, sodium chloride 0.9%    Allergies: Patient has No Known Allergies.      Physical Exam:    Temp:  [98.9 °F (37.2 °C)-99.2 °F (37.3 °C)]   Pulse:  []   Resp:  [26-27]   BP: (121-133)/(57-65)   SpO2:  [91 %-97 %]     Constitutional: Appears well-developed and well-nourished.   Head: Normocephalic and atraumatic.   Mouth/Throat: Oropharynx is clear and moist.   Eyes: EOM are normal. Pupils are equal, round, and reactive to light. No scleral icterus.   Neck: Normal range of motion. Neck supple.   Cardiovascular: Normal rate  and regular rhythm.  No murmur heard.  Pulmonary/Chest: On nasal cannula.  Rales in the LLL  Abdominal: Soft. Bowel sounds are normal.  No distension or tenderness  Musculoskeletal: Normal range of motion. Mild 1+ edema BLE  Neurological: Alert and oriented to person, place, and time.   Skin: Skin is warm and dry.   Psychiatric: Normal mood and affect. Behavior is normal.         Intake/Output Summary (Last 24 hours) at 04/05/18 2213  Last data filed at 04/05/18 2115   Gross per 24 hour   Intake              250 ml   Output                0 ml   Net              250 ml       Recent Labs  Lab 04/05/18  1715   WBC 23.59*   HGB 10.3*   HCT 34.0*   *       Recent Labs  Lab 04/05/18  1715      K 4.0      CO2 25   BUN 13   CREATININE 1.0   *   CALCIUM 8.7   MG 1.8       Recent Labs  Lab 04/05/18  1715   ALKPHOS 142*   ALT 47*   AST 48*   ALBUMIN 2.5*   PROT 7.2   BILITOT 0.9   INR 1.2      No results for input(s): POCTGLUCOSE in the last 168 hours.  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     Acute Respiratory Failure with Hypoxia  Pneumonia likely due to Pneumococcus  · 3/4  SIRS criteria:  HR >90, RR >20, WBC >12  · Satting 97% on 2L NC  · Check blood cultures  · Duonebs q4h while awake and prn  · Guaifenesin and Codeine cough syrup prn cough and Tesslon Perles  · Refused Pneumonia vaccine previously  · Continue Ceftriaxone 1g IV daily  · Continue Azithromycin 250mg PO daily    Seropositive RA of Multiple Sites  Long Term Use of Systemic Steroids  · Chronic and stable  · Continue Plaquenil 400mg PO daily  · Continue Prednisone 5mg PO daily    PUD  · Chronic and stable  · Continue PPI    Fatty Liver  · Chronic  · LFTs more elevated then normal  · Continue to monitor    Essential HTN  · Chronic and stable  · Continue Norvasc 5mg PO daily    Vascular Dementia  History of CVA  · Chronic and stable  · Continue Plavix 75mg PO daily    Cervical Dystonia  ·  Chronic and stable  · Continue Gabapentin 300mg PO TID  · Continue Baclofen 10mg PO TID     Diet:  Regular  GI PPx:  PPI  DVT PPx:  Lovenox 40mg PO daily  Goals of Care:  Full    High Risk Conditions:  Patient has a condition that poses threat to life and bodily function: Severe Respiratory Distress     Disposition:  Pending improvement in respiratory status    Karen Toledo MD  Hospital Medicine  Cell:  [phone]  Spectra:  [phone]  Pager:  [phone]

## 2018-04-06 NOTE — ASSESSMENT & PLAN NOTE
- PNA combined with either CHF or COPD exacerbation  - PNA (as below)  - CHF exacerbation: pt with elevated BNP, LE swelling, last ECHO showed normal EF but diastolic dysfunction, 1x dose of IV lasix  - COPD exacerbation: increase steroid to 40mg QD for now, nebs, spiriva   - On 2L NC at this time, Continue to wean

## 2018-04-06 NOTE — PT/OT/SLP EVAL
Occupational Therapy   Evaluation and Treatment    Name: Selma Lux MD  MRN: [id number]  Admitting Diagnosis:  Acute respiratory failure with hypoxia      Recommendations:     Discharge Recommendations: nursing facility, skilled (short stay)  Discharge Equipment Recommendations:   (TBD pending progress)  Barriers to discharge:  Decreased caregiver support    History:     Occupational Profile:  Living Environment: Pt lives alone in a 1SH with 5 GLORIA and B rails; pt has a tub and a walk-in shower, but reports only using the clawfoot tub (which does not have a shower)  Previous level of function: (I) in ADLs and SPC ambulation; owns but does not use RW and BSC. Pt has an aide who comes 5 days/week to assist with cooking and bathing as needed  Roles and Routines: friend  Equipment Owned:  cane, straight, walker, rolling, bedside commode  Assistance upon Discharge: available 5 days / week in the morning    Past Medical History:   Diagnosis Date    Anemia     Arthritis     Hashimoto's disease     Hypertension     IGT (impaired glucose tolerance) 1/12/2016    Joint pain     Multinodular goiter 1/12/2016    Stroke        Past Surgical History:   Procedure Laterality Date    CATARACT EXTRACTION      COLONOSCOPY N/A 9/29/2015    Procedure: COLONOSCOPY;  Surgeon: FIDELINA Sanchez MD;  Location: [address]);  Service: Endoscopy;  Laterality: N/A;    EYE SURGERY      JOINT REPLACEMENT      right knee    KNEE SURGERY Left 12/31/2013    TKR    left parotidectomy      mixed tumor    SALIVARY GLAND SURGERY      TONSILLECTOMY         Subjective     Chief Complaint: fatigue; being sick  Patient/Family stated goals: get better  Communicated with: RN prior to session.  Pain/Comfort:  · Pain Rating 1: 0/10  · Pain Rating Post-Intervention 1: 0/10    Patients cultural, spiritual, Voodoo conflicts given the current situation: none stated    Objective:     Patient found with: telemetry, oxygen    General  Precautions: Standard, fall   Orthopedic Precautions:N/A   Braces: N/A     Occupational Performance:    Bed Mobility:    · Patient completed Supine to Sit with stand by assistance    Functional Mobility/Transfers:  · Patient completed Sit <> Stand Transfer with contact guard assistance  with  straight cane   · Patient completed Bed <> Chair Transfer using Stand Pivot technique with contact guard assistance with straight cane  · Functional Mobility: CGA with SPC, ~15 feet at most    Activities of Daily Living:  · UB Dressing: maximal assistance gown  · Toileting: per report; pt used BSC with (I)ce     · Re toileting: pt with unsafe mobility when alone, and should at least have SBA for OOB activity    Cognitive/Visual Perceptual:  Cognitive/Psychosocial Skills:     -       Oriented to: Person, Place, Time and Situation   -       Follows Commands/attention:Follows multistep  commands  -       Communication: clear/fluent  -       Memory: No Deficits noted  -       Safety awareness/insight to disability: intact   -       Mood/Affect/Coping skills/emotional control: Appropriate to situation  Visual/Perceptual:      -Intact    Physical Exam:  Balance:    -       Fair  Postural examination/scapula alignment:    -       Rounded shoulders  Dominant hand:    -       L  Upper Extremity Range of Motion:     -       Right Upper Extremity: 85 degrees; pt reports torn rotator cuff bilaterally  -       Left Upper Extremity: same  Upper Extremity Strength:    -       Right Upper Extremity: 2+/5 at shoulders; otherwise WFL  -       Left Upper Extremity: same   Strength:    -       Right Upper Extremity: WFL  -       Left Upper Extremity: WFL  Fine Motor Coordination:    -       Intact  Gross motor coordination:   impaired    Patient left up in chair with all lines intact and call button in reach    AMPA 6 Click:  AMPA Total Score: 18    Treatment & Education:  Pt ed re OT role and POC. Pt performed toileting without supervision,  "but requires SBA for safety and completion of task. Pt ambulates with CGA for safety and decreased endurance, with SPC. Pt required Max A to don gown. Pt required Total A to don scarf around hair. Pt left UIC and ed re leg extensions, marches, ankle pumps and arm raises.   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  She presents with the following performance deficits affecting function are weakness, impaired endurance, impaired self care skills, impaired functional mobilty, decreased upper extremity function, decreased ROM, decreased coordination, decreased safety awareness, gait instability, impaired balance, decreased lower extremity function.  Pt participates well and is motivated to regain functional independence. Pt requiring CGA for mobility and is limited in distance by decreased endurance. Pt would benefit from short stay SNF to ensure return of PLOF and safety with ADLs as pt lives alone and does not have adequate assistance currently.    Rehab Prognosis:  Good; patient would benefit from acute skilled OT services to address these deficits and reach maximum level of function.         Clinical Decision Makin.  OT Low:  "Pt evaluation falls under low complexity for evaluation coding due to performance deficits noted in 1-3 areas as stated above and 0 co-morbities affecting current functional status. Data obtained from problem focused assessments. No modifications or assistance was required for completion of evaluation. Only brief occupational profile and history review completed."     Plan:     Patient to be seen 3 x/week to address the above listed problems via self-care/home management, therapeutic activities, therapeutic exercises  · Plan of Care Expires: 18  · Plan of Care Reviewed with: patient    This Plan of care has been discussed with the patient who was involved in its development and " understands and is in agreement with the identified goals and treatment plan    GOALS:    Occupational Therapy Goals        Problem: Occupational Therapy Goal    Goal Priority Disciplines Outcome Interventions   Occupational Therapy Goal     OT, PT/OT Ongoing (interventions implemented as appropriate)    Description:  Goals to be met by: 4/30/18     Patient will increase functional independence with ADLs by performing:    UE Dressing with Supervision.  LE Dressing with Stand-by Assistance.  Grooming while standing at sink with Stand-by Assistance.  Toileting from toilet with Supervision for hygiene and clothing management.   Supine to sit with Modified [place].  Stand pivot transfers with Stand-by Assistance.                      Time Tracking:     OT Date of Treatment: 04/06/18  OT Start Time: 0849  OT Stop Time: 0856   OT Return Time: 0918 - 0939  OT Total Time (min): 28 min    Billable Minutes:Evaluation 20 minutes  Self Care/Home Management 8 minutes    AYAD [place]  4/6/2018  Pager: [phone]

## 2018-04-06 NOTE — ASSESSMENT & PLAN NOTE
- 3/4 SIRS criteria:  HR >90, RR >20, WBC >12k in ED, Satting 92% on 2L NC  - Suspect lung as the cause (seen on CXR)  - Duonebs q4h while awake and prn  - Guaifenesin and Codeine cough syrup prn cough and Tesslon Perles  - Refused Pneumonia vaccine previously  - Continue Ceftriaxone 1g IV daily, Azithromycin 250mg PO daily

## 2018-04-06 NOTE — ASSESSMENT & PLAN NOTE
- Chronic and stable  - Continue Plaquenil 400mg PO daily  - Increase PO prednisone for now, will resume Prednisone 5mg PO daily soon

## 2018-04-06 NOTE — PLAN OF CARE
Problem: Patient Care Overview  Goal: Plan of Care Review  Outcome: Ongoing (interventions implemented as appropriate)  Discussed plan of care with the patient, including medications given.  Discussed diet.  Patient remains on 2 liters of O2 and she is receiving respiratory treatments.  Assisted patient with ambulation and she also uses a cane.

## 2018-04-06 NOTE — SUBJECTIVE & OBJECTIVE
Interval History: Admitted overnight for resp failure. Report LE swelling. On 2L NC    Review of Systems   Constitutional: Negative for chills, fatigue, fever.   HENT: Negative for sore throat, trouble swallowing.    Eyes: Negative for photophobia, visual disturbance.   Respiratory: + cough, shortness of breath.    Cardiovascular: Negative for chest pain, palpitations, leg swelling.   Gastrointestinal: Negative for abdominal pain, constipation, diarrhea, nausea, vomiting.   Endocrine: Negative for cold intolerance, heat intolerance.   Genitourinary: Negative for dysuria, frequency.   Musculoskeletal: Negative for arthralgias, myalgias.   Skin: Negative for rash, wound, + erythema   Neurological: Negative for dizziness, syncope, weakness, light-headedness.   Psychiatric/Behavioral: Negative for confusion, hallucinations, anxiety  All other systems reviewed and are negative.    Objective:     Vital Signs (Most Recent):  Temp: 99.9 °F (37.7 °C) (04/06/18 1142)  Pulse: 95 (04/06/18 1506)  Resp: 18 (04/06/18 1142)  BP: (!) 125/58 (04/06/18 1142)  SpO2: (!) 92 % (04/06/18 1142) Vital Signs (24h Range):  Temp:  [98.6 °F (37 °C)-100.5 °F (38.1 °C)] 99.9 °F (37.7 °C)  Pulse:  [] 95  Resp:  [17-28] 18  SpO2:  [91 %-98 %] 92 %  BP: (121-153)/(57-94) 125/58     Weight: 68 kg (150 lb)  Body mass index is 24.96 kg/m².    Intake/Output Summary (Last 24 hours) at 04/06/18 1541  Last data filed at 04/06/18 0200   Gross per 24 hour   Intake              250 ml   Output              200 ml   Net               50 ml      Physical Exam  Constitutional: Appears well-developed and well-nourished.   Head: Normocephalic and atraumatic.   Mouth/Throat: Oropharynx is clear and moist.   Eyes: EOM are normal. Pupils are equal, round, and reactive to light. No scleral icterus.   Neck: Normal range of motion. Neck supple.   Cardiovascular: Normal rate and regular rhythm.  No murmur heard.  Pulmonary/Chest: On nasal cannula.  Rales in the  LLL  Abdominal: Soft. Bowel sounds are normal.  No distension or tenderness  Musculoskeletal: Normal range of motion. Mild 1+ edema BLE  Neurological: Alert and oriented to person, place, and time.   Skin: Skin is warm and dry.   Psychiatric: Normal mood and affect. Behavior is normal.    MELD-Na score: 8 at 4/6/2018  3:41 AM  MELD score: 8 at 4/6/2018  3:41 AM  Calculated from:  Serum Creatinine: 0.8 mg/dL (Rounded to 1) at 4/6/2018  3:41 AM  Serum Sodium: 136 mmol/L at 4/6/2018  3:41 AM  Total Bilirubin: 0.7 mg/dL (Rounded to 1) at 4/6/2018  3:41 AM  INR(ratio): 1.2 at 4/5/2018  5:15 PM  Age: 82 years    Significant Labs:  CBC:    Recent Labs  Lab 04/05/18 1715 04/06/18  0341   WBC 23.59* 24.21*   HGB 10.3* 9.6*   HCT 34.0* 30.9*   * 360*     CMP:    Recent Labs  Lab 04/05/18 1715 04/06/18  0341    136   K 4.0 3.6    103   CO2 25 24   * 83   BUN 13 10   CREATININE 1.0 0.8   CALCIUM 8.7 8.3*   PROT 7.2 6.2   ALBUMIN 2.5* 2.1*   BILITOT 0.9 0.7   ALKPHOS 142* 116   AST 48* 24   ALT 47* 36   ANIONGAP 8 9   EGFRNONAA 53.3* >60.0     PTINR:    Recent Labs  Lab 04/05/18 1715   INR 1.2       Significant Procedures:   Dobutamine Stress Test with Color Flow: No results found for this or any previous visit.

## 2018-04-07 LAB
ALBUMIN SERPL BCP-MCNC: 2.1 G/DL
ALP SERPL-CCNC: 130 U/L
ALT SERPL W/O P-5'-P-CCNC: 40 U/L
ANION GAP SERPL CALC-SCNC: 11 MMOL/L
AST SERPL-CCNC: 22 U/L
BASOPHILS # BLD AUTO: 0.01 K/UL
BASOPHILS NFR BLD: 0.1 %
BILIRUB SERPL-MCNC: 0.5 MG/DL
BUN SERPL-MCNC: 13 MG/DL
CALCIUM SERPL-MCNC: 8.8 MG/DL
CHLORIDE SERPL-SCNC: 102 MMOL/L
CO2 SERPL-SCNC: 24 MMOL/L
CREAT SERPL-MCNC: 0.8 MG/DL
DIASTOLIC DYSFUNCTION: YES
DIFFERENTIAL METHOD: ABNORMAL
EOSINOPHIL # BLD AUTO: 0 K/UL
EOSINOPHIL NFR BLD: 0.1 %
ERYTHROCYTE [DISTWIDTH] IN BLOOD BY AUTOMATED COUNT: 15.2 %
EST. GFR  (AFRICAN AMERICAN): >60 ML/MIN/1.73 M^2
EST. GFR  (NON AFRICAN AMERICAN): >60 ML/MIN/1.73 M^2
ESTIMATED PA SYSTOLIC PRESSURE: 39.36
GLOBAL PERICARDIAL EFFUSION: ABNORMAL
GLUCOSE SERPL-MCNC: 183 MG/DL
HCT VFR BLD AUTO: 31.5 %
HGB BLD-MCNC: 9.7 G/DL
IMM GRANULOCYTES # BLD AUTO: 0.08 K/UL
IMM GRANULOCYTES NFR BLD AUTO: 0.4 %
LYMPHOCYTES # BLD AUTO: 0.9 K/UL
LYMPHOCYTES NFR BLD: 4.8 %
MAGNESIUM SERPL-MCNC: 1.7 MG/DL
MCH RBC QN AUTO: 24 PG
MCHC RBC AUTO-ENTMCNC: 30.8 G/DL
MCV RBC AUTO: 78 FL
MITRAL VALVE MOBILITY: NORMAL
MITRAL VALVE REGURGITATION: ABNORMAL
MONOCYTES # BLD AUTO: 1.1 K/UL
MONOCYTES NFR BLD: 5.8 %
NEUTROPHILS # BLD AUTO: 17.1 K/UL
NEUTROPHILS NFR BLD: 88.8 %
NRBC BLD-RTO: 0 /100 WBC
PHOSPHATE SERPL-MCNC: 4 MG/DL
PLATELET # BLD AUTO: 346 K/UL
PMV BLD AUTO: 9.8 FL
POTASSIUM SERPL-SCNC: 4.2 MMOL/L
PROT SERPL-MCNC: 6.4 G/DL
RBC # BLD AUTO: 4.04 M/UL
RETIRED EF AND QEF - SEE NOTES: 65 (ref 55–65)
SODIUM SERPL-SCNC: 137 MMOL/L
TRICUSPID VALVE REGURGITATION: ABNORMAL
WBC # BLD AUTO: 19.21 K/UL

## 2018-04-07 PROCEDURE — 99232 SBSQ HOSP IP/OBS MODERATE 35: CPT | Mod: ,,, | Performed by: HOSPITALIST

## 2018-04-07 PROCEDURE — 94640 AIRWAY INHALATION TREATMENT: CPT

## 2018-04-07 PROCEDURE — 93306 TTE W/DOPPLER COMPLETE: CPT

## 2018-04-07 PROCEDURE — 25000003 PHARM REV CODE 250: Performed by: PHYSICIAN ASSISTANT

## 2018-04-07 PROCEDURE — 36415 COLL VENOUS BLD VENIPUNCTURE: CPT

## 2018-04-07 PROCEDURE — 27000221 HC OXYGEN, UP TO 24 HOURS

## 2018-04-07 PROCEDURE — 63600175 PHARM REV CODE 636 W HCPCS: Performed by: HOSPITALIST

## 2018-04-07 PROCEDURE — 85025 COMPLETE CBC W/AUTO DIFF WBC: CPT

## 2018-04-07 PROCEDURE — 80053 COMPREHEN METABOLIC PANEL: CPT

## 2018-04-07 PROCEDURE — 93306 TTE W/DOPPLER COMPLETE: CPT | Mod: 26,,, | Performed by: INTERNAL MEDICINE

## 2018-04-07 PROCEDURE — 83735 ASSAY OF MAGNESIUM: CPT

## 2018-04-07 PROCEDURE — 25000003 PHARM REV CODE 250: Performed by: HOSPITALIST

## 2018-04-07 PROCEDURE — 94761 N-INVAS EAR/PLS OXIMETRY MLT: CPT

## 2018-04-07 PROCEDURE — 11000001 HC ACUTE MED/SURG PRIVATE ROOM

## 2018-04-07 PROCEDURE — 84100 ASSAY OF PHOSPHORUS: CPT

## 2018-04-07 PROCEDURE — 25000242 PHARM REV CODE 250 ALT 637 W/ HCPCS: Performed by: HOSPITALIST

## 2018-04-07 RX ORDER — FUROSEMIDE 10 MG/ML
20 INJECTION INTRAMUSCULAR; INTRAVENOUS ONCE
Status: COMPLETED | OUTPATIENT
Start: 2018-04-07 | End: 2018-04-07

## 2018-04-07 RX ORDER — BENZONATATE 100 MG/1
200 CAPSULE ORAL 3 TIMES DAILY
Status: DISCONTINUED | OUTPATIENT
Start: 2018-04-07 | End: 2018-04-11

## 2018-04-07 RX ORDER — PREDNISONE 2.5 MG/1
5 TABLET ORAL DAILY
Status: DISCONTINUED | OUTPATIENT
Start: 2018-04-08 | End: 2018-04-11

## 2018-04-07 RX ORDER — PREDNISONE 5 MG/1
5 TABLET ORAL ONCE
Status: COMPLETED | OUTPATIENT
Start: 2018-04-07 | End: 2018-04-07

## 2018-04-07 RX ADMIN — IPRATROPIUM BROMIDE AND ALBUTEROL SULFATE 3 ML: .5; 3 SOLUTION RESPIRATORY (INHALATION) at 07:04

## 2018-04-07 RX ADMIN — AZITHROMYCIN 250 MG: 250 TABLET, FILM COATED ORAL at 10:04

## 2018-04-07 RX ADMIN — FUROSEMIDE 20 MG: 10 INJECTION, SOLUTION INTRAMUSCULAR; INTRAVENOUS at 10:04

## 2018-04-07 RX ADMIN — BENZONATATE 200 MG: 100 CAPSULE ORAL at 04:04

## 2018-04-07 RX ADMIN — GABAPENTIN 300 MG: 300 CAPSULE ORAL at 10:04

## 2018-04-07 RX ADMIN — CEFTRIAXONE SODIUM 1 G: 1 INJECTION, POWDER, FOR SOLUTION INTRAMUSCULAR; INTRAVENOUS at 09:04

## 2018-04-07 RX ADMIN — PREDNISONE 5 MG: 5 TABLET ORAL at 01:04

## 2018-04-07 RX ADMIN — GABAPENTIN 300 MG: 300 CAPSULE ORAL at 04:04

## 2018-04-07 RX ADMIN — BENZONATATE 200 MG: 100 CAPSULE ORAL at 09:04

## 2018-04-07 RX ADMIN — BACLOFEN 10 MG: 10 TABLET ORAL at 10:04

## 2018-04-07 RX ADMIN — MONTELUKAST SODIUM 10 MG: 10 TABLET, FILM COATED ORAL at 10:04

## 2018-04-07 RX ADMIN — CLOPIDOGREL 75 MG: 75 TABLET, FILM COATED ORAL at 09:04

## 2018-04-07 RX ADMIN — BACLOFEN 10 MG: 10 TABLET ORAL at 09:04

## 2018-04-07 RX ADMIN — GABAPENTIN 300 MG: 300 CAPSULE ORAL at 09:04

## 2018-04-07 RX ADMIN — BACLOFEN 10 MG: 10 TABLET ORAL at 04:04

## 2018-04-07 RX ADMIN — HYDROXYCHLOROQUINE SULFATE 400 MG: 200 TABLET, FILM COATED ORAL at 10:04

## 2018-04-07 RX ADMIN — IPRATROPIUM BROMIDE AND ALBUTEROL SULFATE 3 ML: .5; 3 SOLUTION RESPIRATORY (INHALATION) at 04:04

## 2018-04-07 RX ADMIN — BENZONATATE 200 MG: 100 CAPSULE ORAL at 10:04

## 2018-04-07 RX ADMIN — AMLODIPINE BESYLATE 5 MG: 5 TABLET ORAL at 10:04

## 2018-04-07 RX ADMIN — BENZONATATE 200 MG: 100 CAPSULE ORAL at 02:04

## 2018-04-07 RX ADMIN — IPRATROPIUM BROMIDE AND ALBUTEROL SULFATE 3 ML: .5; 3 SOLUTION RESPIRATORY (INHALATION) at 11:04

## 2018-04-07 RX ADMIN — PROMETHAZINE HYDROCHLORIDE AND CODEINE PHOSPHATE 5 ML: 10; 6.25 SOLUTION ORAL at 09:04

## 2018-04-07 RX ADMIN — PANTOPRAZOLE SODIUM 40 MG: 40 TABLET, DELAYED RELEASE ORAL at 10:04

## 2018-04-07 NOTE — NURSING
Spoke with Dr. Bacon regarding prednisone dose and order received to place a one time dose for prednisone 5mg.

## 2018-04-07 NOTE — PLAN OF CARE
Problem: Patient Care Overview  Goal: Plan of Care Review  Outcome: Ongoing (interventions implemented as appropriate)  Discussed plan of care with the patient including medications given.  Patient remains on O2 at 2 liters.  Lasix given.

## 2018-04-07 NOTE — ASSESSMENT & PLAN NOTE
- PNA combined with either CHF or COPD exacerbation  - PNA (as below)  - CHF exacerbation: pt with elevated BNP, LE swelling, last ECHO showed normal EF but diastolic dysfunction, 1x dose of IV lasix with good UOP, will continue  - 2D ECHO pending  - On 2L NC at this time, Continue to wean   - stop pred per pt request, change back to 5mg

## 2018-04-07 NOTE — PROGRESS NOTES
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  She mentions that for 2 weeks, she has been having a dry cough.  She endorses worsening SOB and fatigue.  She went to see her PCP who started her on an inhaler.  She was given Tessalon Perles which have helped her cough.  She doesn't wear oxygen at home.  She denies any sick contacts, chest pain, palpitations.  She denies any fevers or chills.  She claims to be compliant with her medications.  She didn't receive the pneumonia vaccine.    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 IV lasix started with good UOP and improvement of LE swelling.     Interval History: Report coughing spell leading to shortness of breath episode. Good UOP.     Review of Systems  Constitutional: Negative for chills, fatigue, fever.   HENT: Negative for sore throat, trouble swallowing.    Eyes: Negative for photophobia, visual disturbance.   Respiratory: + cough, shortness of breath.    Cardiovascular: Negative for chest pain, palpitations, leg swelling.   Gastrointestinal: Negative for abdominal pain, constipation, diarrhea, nausea, vomiting.   Endocrine: Negative for cold intolerance, heat intolerance.   Genitourinary: Negative for dysuria, frequency.   Musculoskeletal: Negative for arthralgias, myalgias.   Skin: Negative for rash, wound   Neurological: Negative for dizziness, syncope, weakness, light-headedness.    Psychiatric/Behavioral: Negative for confusion, hallucinations, anxiety  All other systems reviewed and are negative.      Objective:     Vital Signs (Most Recent):  Temp: 97 °F (36.1 °C) (04/07/18 1109)  Pulse: 90 (04/07/18 1153)  Resp: 16 (04/07/18 1153)  BP: (!) 150/70 (04/07/18 1109)  SpO2: 98 % (04/07/18 1153) Vital Signs (24h Range):  Temp:  [97 °F (36.1 °C)-98.8 °F (37.1 °C)] 97 °F (36.1 °C)  Pulse:  [80-95] 90  Resp:  [14-24] 16  SpO2:  [92 %-98 %] 98 %  BP: (119-150)/(59-76) 150/70     Weight: 73.6 kg (162 lb 4.1 oz)  Body mass index is 27 kg/m².    Intake/Output Summary (Last 24 hours) at 04/07/18 1343  Last data filed at 04/07/18 0857   Gross per 24 hour   Intake              850 ml   Output             1900 ml   Net            -1050 ml        Physical Exam  Constitutional: Appears well-developed and well-nourished.   Head: Normocephalic and atraumatic.   Mouth/Throat: Oropharynx is clear and moist.   Eyes: EOM are normal. Pupils are equal, round, and reactive to light. No scleral icterus.   Neck: Normal range of motion. Neck supple.   Cardiovascular: Normal rate and regular rhythm.  No murmur heard.  Pulmonary/Chest: On nasal cannula.  Rales in the LLL  Abdominal: Soft. Bowel sounds are normal.  No distension or tenderness  Musculoskeletal: Normal range of motion. Mild 1+ edema BLE  Neurological: Alert and oriented to person, place, and time.   Skin: Skin is warm and dry.   Psychiatric: Normal mood and affect. Behavior is normal.      MELD-Na score: 8 at 4/7/2018  4:08 AM  MELD score: 8 at 4/7/2018  4:08 AM  Calculated from:  Serum Creatinine: 0.8 mg/dL (Rounded to 1) at 4/7/2018  4:08 AM  Serum Sodium: 137 mmol/L at 4/7/2018  4:08 AM  Total Bilirubin: 0.5 mg/dL (Rounded to 1) at 4/7/2018  4:08 AM  INR(ratio): 1.2 at 4/5/2018  5:15 PM  Age: 82 years    Significant Labs:  CBC:    Recent Labs  Lab 04/05/18  1715 04/06/18  0341 04/07/18  0408   WBC 23.59* 24.21* 19.21*   HGB 10.3* 9.6* 9.7*   HCT 34.0*  30.9* 31.5*   * 360* 346     CMP:    Recent Labs  Lab 04/05/18  1715 04/06/18  0341 04/07/18  0408    136 137   K 4.0 3.6 4.2    103 102   CO2 25 24 24   * 83 183*   BUN 13 10 13   CREATININE 1.0 0.8 0.8   CALCIUM 8.7 8.3* 8.8   PROT 7.2 6.2 6.4   ALBUMIN 2.5* 2.1* 2.1*   BILITOT 0.9 0.7 0.5   ALKPHOS 142* 116 130   AST 48* 24 22   ALT 47* 36 40   ANIONGAP 8 9 11   EGFRNONAA 53.3* >60.0 >60.0     PTINR:    Recent Labs  Lab 04/05/18  1715   INR 1.2       Significant Procedures:   Dobutamine Stress Test with Color Flow: No results found for this or any previous visit.         Assessment/Plan:      * Acute respiratory failure with hypoxia    - PNA combined with either CHF or COPD exacerbation  - PNA (as below)  - CHF exacerbation: pt with elevated BNP, LE swelling, last ECHO showed normal EF but diastolic dysfunction, 1x dose of IV lasix with good UOP, will continue  - 2D ECHO pending  - On 2L NC at this time, Continue to wean   - stop pred per pt request, change back to 5mg        Pneumonia due to Streptococcus pneumoniae    - 3/4 SIRS criteria:  HR >90, RR >20, WBC >12k in ED, Satting 92% on 2L NC  - Suspect lung as the cause (seen on CXR)  - Duonebs q4h while awake and prn  - Guaifenesin and Codeine cough syrup prn cough and Tesslon Perles  - Refused Pneumonia vaccine previously  - Continue Ceftriaxone 1g IV daily, Azithromycin 250mg PO daily            Seropositive rheumatoid arthritis of multiple sites    - Chronic and stable  - Continue Plaquenil 400mg PO daily  - Prednisone 5mg PO daily soon          Cervical dystonia    - Chronic and stable  - Continue Gabapentin 300mg PO TID  - Continue Baclofen 10mg PO TID          Cerebrovascular accident (CVA)    - Loves at home by herself  - PT/OT consulted: SNF          Vascular dementia    - Chronic and stable  - Continue Plavix 75mg PO daily             Hypertension, essential    - Chronic and stable  - Continue Norvasc 5mg PO daily              Fatty liver    - Chronic  - LFTs more elevated then normal  - Continue to monitor            VTE Risk Mitigation         Ordered     IP VTE HIGH RISK PATIENT  Once      04/05/18 2313     Place DIMPLE hose  Until discontinued      04/05/18 2210              Adama Bacon MD  Department of Hospital Medicine   [facility]

## 2018-04-07 NOTE — SUBJECTIVE & OBJECTIVE
Interval History: Report coughing spell leading to shortness of breath episode. Good UOP.     Review of Systems  Constitutional: Negative for chills, fatigue, fever.   HENT: Negative for sore throat, trouble swallowing.    Eyes: Negative for photophobia, visual disturbance.   Respiratory: + cough, shortness of breath.    Cardiovascular: Negative for chest pain, palpitations, leg swelling.   Gastrointestinal: Negative for abdominal pain, constipation, diarrhea, nausea, vomiting.   Endocrine: Negative for cold intolerance, heat intolerance.   Genitourinary: Negative for dysuria, frequency.   Musculoskeletal: Negative for arthralgias, myalgias.   Skin: Negative for rash, wound   Neurological: Negative for dizziness, syncope, weakness, light-headedness.   Psychiatric/Behavioral: Negative for confusion, hallucinations, anxiety  All other systems reviewed and are negative.      Objective:     Vital Signs (Most Recent):  Temp: 97 °F (36.1 °C) (04/07/18 1109)  Pulse: 90 (04/07/18 1153)  Resp: 16 (04/07/18 1153)  BP: (!) 150/70 (04/07/18 1109)  SpO2: 98 % (04/07/18 1153) Vital Signs (24h Range):  Temp:  [97 °F (36.1 °C)-98.8 °F (37.1 °C)] 97 °F (36.1 °C)  Pulse:  [80-95] 90  Resp:  [14-24] 16  SpO2:  [92 %-98 %] 98 %  BP: (119-150)/(59-76) 150/70     Weight: 73.6 kg (162 lb 4.1 oz)  Body mass index is 27 kg/m².    Intake/Output Summary (Last 24 hours) at 04/07/18 1343  Last data filed at 04/07/18 0857   Gross per 24 hour   Intake              850 ml   Output             1900 ml   Net            -1050 ml        Physical Exam  Constitutional: Appears well-developed and well-nourished.   Head: Normocephalic and atraumatic.   Mouth/Throat: Oropharynx is clear and moist.   Eyes: EOM are normal. Pupils are equal, round, and reactive to light. No scleral icterus.   Neck: Normal range of motion. Neck supple.   Cardiovascular: Normal rate and regular rhythm.  No murmur heard.  Pulmonary/Chest: On nasal cannula.  Rales in the  LLL  Abdominal: Soft. Bowel sounds are normal.  No distension or tenderness  Musculoskeletal: Normal range of motion. Mild 1+ edema BLE  Neurological: Alert and oriented to person, place, and time.   Skin: Skin is warm and dry.   Psychiatric: Normal mood and affect. Behavior is normal.      MELD-Na score: 8 at 4/7/2018  4:08 AM  MELD score: 8 at 4/7/2018  4:08 AM  Calculated from:  Serum Creatinine: 0.8 mg/dL (Rounded to 1) at 4/7/2018  4:08 AM  Serum Sodium: 137 mmol/L at 4/7/2018  4:08 AM  Total Bilirubin: 0.5 mg/dL (Rounded to 1) at 4/7/2018  4:08 AM  INR(ratio): 1.2 at 4/5/2018  5:15 PM  Age: 82 years    Significant Labs:  CBC:    Recent Labs  Lab 04/05/18 1715 04/06/18  0341 04/07/18  0408   WBC 23.59* 24.21* 19.21*   HGB 10.3* 9.6* 9.7*   HCT 34.0* 30.9* 31.5*   * 360* 346     CMP:    Recent Labs  Lab 04/05/18 1715 04/06/18  0341 04/07/18  0408    136 137   K 4.0 3.6 4.2    103 102   CO2 25 24 24   * 83 183*   BUN 13 10 13   CREATININE 1.0 0.8 0.8   CALCIUM 8.7 8.3* 8.8   PROT 7.2 6.2 6.4   ALBUMIN 2.5* 2.1* 2.1*   BILITOT 0.9 0.7 0.5   ALKPHOS 142* 116 130   AST 48* 24 22   ALT 47* 36 40   ANIONGAP 8 9 11   EGFRNONAA 53.3* >60.0 >60.0     PTINR:    Recent Labs  Lab 04/05/18 1715   INR 1.2       Significant Procedures:   Dobutamine Stress Test with Color Flow: No results found for this or any previous visit.

## 2018-04-08 LAB
ALBUMIN SERPL BCP-MCNC: 2.2 G/DL
ALP SERPL-CCNC: 137 U/L
ALT SERPL W/O P-5'-P-CCNC: 52 U/L
ANION GAP SERPL CALC-SCNC: 12 MMOL/L
ANISOCYTOSIS BLD QL SMEAR: SLIGHT
AST SERPL-CCNC: 42 U/L
BASOPHILS # BLD AUTO: 0.06 K/UL
BASOPHILS NFR BLD: 0.2 %
BILIRUB SERPL-MCNC: 0.7 MG/DL
BUN SERPL-MCNC: 13 MG/DL
BURR CELLS BLD QL SMEAR: ABNORMAL
CALCIUM SERPL-MCNC: 8.8 MG/DL
CHLORIDE SERPL-SCNC: 100 MMOL/L
CO2 SERPL-SCNC: 23 MMOL/L
CREAT SERPL-MCNC: 0.8 MG/DL
D DIMER PPP IA.FEU-MCNC: 3.9 MG/L FEU
DIFFERENTIAL METHOD: ABNORMAL
EOSINOPHIL # BLD AUTO: 0.6 K/UL
EOSINOPHIL NFR BLD: 1.8 %
ERYTHROCYTE [DISTWIDTH] IN BLOOD BY AUTOMATED COUNT: 15.5 %
EST. GFR  (AFRICAN AMERICAN): >60 ML/MIN/1.73 M^2
EST. GFR  (NON AFRICAN AMERICAN): >60 ML/MIN/1.73 M^2
GLUCOSE SERPL-MCNC: 80 MG/DL
HCT VFR BLD AUTO: 36.4 %
HGB BLD-MCNC: 11.4 G/DL
HYPOCHROMIA BLD QL SMEAR: ABNORMAL
IMM GRANULOCYTES # BLD AUTO: 0.19 K/UL
IMM GRANULOCYTES NFR BLD AUTO: 0.6 %
LYMPHOCYTES # BLD AUTO: 2 K/UL
LYMPHOCYTES NFR BLD: 6.3 %
MAGNESIUM SERPL-MCNC: 1.5 MG/DL
MCH RBC QN AUTO: 24.5 PG
MCHC RBC AUTO-ENTMCNC: 31.3 G/DL
MCV RBC AUTO: 78 FL
MONOCYTES # BLD AUTO: 4.3 K/UL
MONOCYTES NFR BLD: 13.2 %
NEUTROPHILS # BLD AUTO: 25 K/UL
NEUTROPHILS NFR BLD: 77.9 %
NRBC BLD-RTO: 0 /100 WBC
OVALOCYTES BLD QL SMEAR: ABNORMAL
PHOSPHATE SERPL-MCNC: 3.3 MG/DL
PLATELET # BLD AUTO: 385 K/UL
PMV BLD AUTO: 10.1 FL
POIKILOCYTOSIS BLD QL SMEAR: SLIGHT
POLYCHROMASIA BLD QL SMEAR: ABNORMAL
POTASSIUM SERPL-SCNC: 3.8 MMOL/L
PROT SERPL-MCNC: 6.8 G/DL
RBC # BLD AUTO: 4.66 M/UL
SODIUM SERPL-SCNC: 135 MMOL/L
WBC # BLD AUTO: 32.11 K/UL

## 2018-04-08 PROCEDURE — 97116 GAIT TRAINING THERAPY: CPT

## 2018-04-08 PROCEDURE — 83735 ASSAY OF MAGNESIUM: CPT

## 2018-04-08 PROCEDURE — 11000001 HC ACUTE MED/SURG PRIVATE ROOM

## 2018-04-08 PROCEDURE — 80053 COMPREHEN METABOLIC PANEL: CPT

## 2018-04-08 PROCEDURE — 63600175 PHARM REV CODE 636 W HCPCS: Performed by: HOSPITALIST

## 2018-04-08 PROCEDURE — 85379 FIBRIN DEGRADATION QUANT: CPT

## 2018-04-08 PROCEDURE — 85025 COMPLETE CBC W/AUTO DIFF WBC: CPT

## 2018-04-08 PROCEDURE — 25000003 PHARM REV CODE 250: Performed by: PHYSICIAN ASSISTANT

## 2018-04-08 PROCEDURE — 36415 COLL VENOUS BLD VENIPUNCTURE: CPT

## 2018-04-08 PROCEDURE — 84100 ASSAY OF PHOSPHORUS: CPT

## 2018-04-08 PROCEDURE — 25000242 PHARM REV CODE 250 ALT 637 W/ HCPCS: Performed by: HOSPITALIST

## 2018-04-08 PROCEDURE — 25000003 PHARM REV CODE 250: Performed by: HOSPITALIST

## 2018-04-08 PROCEDURE — 27000221 HC OXYGEN, UP TO 24 HOURS

## 2018-04-08 PROCEDURE — 97530 THERAPEUTIC ACTIVITIES: CPT

## 2018-04-08 PROCEDURE — 99232 SBSQ HOSP IP/OBS MODERATE 35: CPT | Mod: ,,, | Performed by: HOSPITALIST

## 2018-04-08 PROCEDURE — 94640 AIRWAY INHALATION TREATMENT: CPT

## 2018-04-08 RX ORDER — ENOXAPARIN SODIUM 100 MG/ML
40 INJECTION SUBCUTANEOUS EVERY 24 HOURS
Status: DISCONTINUED | OUTPATIENT
Start: 2018-04-08 | End: 2018-04-09

## 2018-04-08 RX ORDER — LUBIPROSTONE 24 UG/1
24 CAPSULE ORAL
Status: DISCONTINUED | OUTPATIENT
Start: 2018-04-08 | End: 2018-04-11

## 2018-04-08 RX ORDER — POLYETHYLENE GLYCOL 3350 17 G/17G
17 POWDER, FOR SOLUTION ORAL DAILY
Status: DISCONTINUED | OUTPATIENT
Start: 2018-04-08 | End: 2018-04-11

## 2018-04-08 RX ADMIN — GABAPENTIN 300 MG: 300 CAPSULE ORAL at 08:04

## 2018-04-08 RX ADMIN — BACLOFEN 10 MG: 10 TABLET ORAL at 08:04

## 2018-04-08 RX ADMIN — GABAPENTIN 300 MG: 300 CAPSULE ORAL at 03:04

## 2018-04-08 RX ADMIN — HYDROXYCHLOROQUINE SULFATE 400 MG: 200 TABLET, FILM COATED ORAL at 08:04

## 2018-04-08 RX ADMIN — IPRATROPIUM BROMIDE AND ALBUTEROL SULFATE 3 ML: .5; 3 SOLUTION RESPIRATORY (INHALATION) at 03:04

## 2018-04-08 RX ADMIN — AZITHROMYCIN 250 MG: 250 TABLET, FILM COATED ORAL at 08:04

## 2018-04-08 RX ADMIN — ENOXAPARIN SODIUM 40 MG: 100 INJECTION SUBCUTANEOUS at 04:04

## 2018-04-08 RX ADMIN — LUBIPROSTONE 24 MCG: 24 CAPSULE, GELATIN COATED ORAL at 11:04

## 2018-04-08 RX ADMIN — IOHEXOL 75 ML: 350 INJECTION, SOLUTION INTRAVENOUS at 11:04

## 2018-04-08 RX ADMIN — GUAIFENESIN 200 MG: 200 SOLUTION ORAL at 12:04

## 2018-04-08 RX ADMIN — MONTELUKAST SODIUM 10 MG: 10 TABLET, FILM COATED ORAL at 08:04

## 2018-04-08 RX ADMIN — BACLOFEN 10 MG: 10 TABLET ORAL at 03:04

## 2018-04-08 RX ADMIN — PROMETHAZINE HYDROCHLORIDE AND CODEINE PHOSPHATE 5 ML: 10; 6.25 SOLUTION ORAL at 08:04

## 2018-04-08 RX ADMIN — PANTOPRAZOLE SODIUM 40 MG: 40 TABLET, DELAYED RELEASE ORAL at 08:04

## 2018-04-08 RX ADMIN — BENZONATATE 200 MG: 100 CAPSULE ORAL at 08:04

## 2018-04-08 RX ADMIN — ACETAMINOPHEN 650 MG: 325 TABLET ORAL at 08:04

## 2018-04-08 RX ADMIN — ONDANSETRON 8 MG: 8 TABLET, ORALLY DISINTEGRATING ORAL at 02:04

## 2018-04-08 RX ADMIN — PREDNISONE 5 MG: 5 TABLET ORAL at 08:04

## 2018-04-08 RX ADMIN — BENZONATATE 200 MG: 100 CAPSULE ORAL at 03:04

## 2018-04-08 RX ADMIN — CLOPIDOGREL 75 MG: 75 TABLET, FILM COATED ORAL at 08:04

## 2018-04-08 RX ADMIN — ONDANSETRON 8 MG: 8 TABLET, ORALLY DISINTEGRATING ORAL at 08:04

## 2018-04-08 RX ADMIN — CEFTRIAXONE SODIUM 1 G: 1 INJECTION, POWDER, FOR SOLUTION INTRAMUSCULAR; INTRAVENOUS at 06:04

## 2018-04-08 RX ADMIN — AMLODIPINE BESYLATE 5 MG: 5 TABLET ORAL at 08:04

## 2018-04-08 RX ADMIN — POLYETHYLENE GLYCOL 3350 17 G: 17 POWDER, FOR SOLUTION ORAL at 11:04

## 2018-04-08 RX ADMIN — IPRATROPIUM BROMIDE AND ALBUTEROL SULFATE 3 ML: .5; 3 SOLUTION RESPIRATORY (INHALATION) at 11:04

## 2018-04-08 NOTE — ASSESSMENT & PLAN NOTE
- PNA combined with either CHF exacerbation vs PE  - PNA (as below)  - CHF exacerbation: pt with elevated BNP, LE swelling, last ECHO showed normal EF but diastolic dysfunction, 1x dose of IV lasix with good UOP, will continue  - 2D ECHO pending showed EF 60-65%, L atrial enlargement, diastolic dysfunction noted.  - On 2-4L NC at this time, Continue to wean   - Even with diuresis, pt resp has not gotten better  - Pt continue to have tachypnea and continue SOB, + d-dimer, discuss with patient, purse CTA chest to eval for PE and lung anatomy

## 2018-04-08 NOTE — PLAN OF CARE
Problem: Patient Care Overview  Goal: Plan of Care Review  Pt had a couple of coughing episodes last night. One resulted in her vomiting up a small amount of mucus. PRN and routine meds given. Pt resting quietly at the present. Pt did refuse for her 0400 VS to be taken. Pt stated that she was tired and wanted to rest. Will continue to monitor pt.

## 2018-04-08 NOTE — SUBJECTIVE & OBJECTIVE
Interval History: Report coughing spell leading to shortness of breath episode. Good UOP.     Review of Systems  Constitutional: Negative for chills, fatigue, fever.   HENT: Negative for sore throat, trouble swallowing.    Eyes: Negative for photophobia, visual disturbance.   Respiratory: + cough, shortness of breath.    Cardiovascular: Negative for chest pain, palpitations, leg swelling.   Gastrointestinal: Negative for abdominal pain, constipation, diarrhea, nausea, vomiting.   Endocrine: Negative for cold intolerance, heat intolerance.   Genitourinary: Negative for dysuria, frequency.   Musculoskeletal: Negative for arthralgias, myalgias.   Skin: Negative for rash, wound   Neurological: Negative for dizziness, syncope, weakness, light-headedness.   Psychiatric/Behavioral: Negative for confusion, hallucinations, anxiety  All other systems reviewed and are negative.      Objective:     Vital Signs (Most Recent):  Temp: 97.7 °F (36.5 °C) (04/08/18 1152)  Pulse: 95 (04/08/18 1518)  Resp: 18 (04/08/18 1152)  BP: 109/65 (04/08/18 1152)  SpO2: (!) 92 % (04/08/18 1200) Vital Signs (24h Range):  Temp:  [97.7 °F (36.5 °C)-98.3 °F (36.8 °C)] 97.7 °F (36.5 °C)  Pulse:  [] 95  Resp:  [18-24] 18  SpO2:  [82 %-95 %] 92 %  BP: ()/(52-77) 109/65     Weight: 73.6 kg (162 lb 4.1 oz)  Body mass index is 27 kg/m².    Intake/Output Summary (Last 24 hours) at 04/08/18 1532  Last data filed at 04/08/18 1100   Gross per 24 hour   Intake             1370 ml   Output             3100 ml   Net            -1730 ml        Physical Exam  Constitutional: Appears well-developed and well-nourished.   Head: Normocephalic and atraumatic.   Mouth/Throat: Oropharynx is clear and moist.   Eyes: EOM are normal. Pupils are equal, round, and reactive to light. No scleral icterus.   Neck: Normal range of motion. Neck supple.   Cardiovascular: Normal rate and regular rhythm.  No murmur heard.  Pulmonary/Chest: On nasal cannula.  Rales in the  LLL  Abdominal: Soft. Bowel sounds are normal.  No distension or tenderness  Musculoskeletal: Normal range of motion. Mild  edema BLE  Neurological: Alert and oriented to person, place, and time.   Skin: Skin is warm and dry.   Psychiatric: Normal mood and affect. Behavior is normal.      MELD-Na score: 8 at 4/7/2018  4:08 AM  MELD score: 8 at 4/7/2018  4:08 AM  Calculated from:  Serum Creatinine: 0.8 mg/dL (Rounded to 1) at 4/7/2018  4:08 AM  Serum Sodium: 137 mmol/L at 4/7/2018  4:08 AM  Total Bilirubin: 0.5 mg/dL (Rounded to 1) at 4/7/2018  4:08 AM  INR(ratio): 1.2 at 4/5/2018  5:15 PM  Age: 82 years    Significant Labs:  CBC:    Recent Labs  Lab 04/07/18  0408 04/08/18  0449   WBC 19.21* 32.11*   HGB 9.7* 11.4*   HCT 31.5* 36.4*    385*     CMP:    Recent Labs  Lab 04/07/18  0408 04/08/18  0449    135*   K 4.2 3.8    100   CO2 24 23   * 80   BUN 13 13   CREATININE 0.8 0.8   CALCIUM 8.8 8.8   PROT 6.4 6.8   ALBUMIN 2.1* 2.2*   BILITOT 0.5 0.7   ALKPHOS 130 137*   AST 22 42*   ALT 40 52*   ANIONGAP 11 12   EGFRNONAA >60.0 >60.0     PTINR:  No results for input(s): INR in the last 48 hours.    Significant Procedures:   Dobutamine Stress Test with Color Flow: No results found for this or any previous visit.

## 2018-04-08 NOTE — PLAN OF CARE
Problem: Patient Care Overview  Goal: Plan of Care Review  Outcome: Ongoing (interventions implemented as appropriate)  POC reviewed with pt & family. AAO x4 .  CTA of chest pending. Elevated D-Dimer. Pt started on lovenox. Pt remained free from falls. Questions and concerns addressed. Pt progressing towards goals. Will continue to monitor. See flow sheets for full assessment and VS

## 2018-04-08 NOTE — PROGRESS NOTES
Notified Dr. Bacon with [facility]. pts BP 95/52. Pt asymptomatic. Orders to recheck BP in one hour. Will continue to monitor pt.         0845: /58

## 2018-04-08 NOTE — PROGRESS NOTES
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  She mentions that for 2 weeks, she has been having a dry cough.  She endorses worsening SOB and fatigue.  She went to see her PCP who started her on an inhaler.  She was given Tessalon Perles which have helped her cough.  She doesn't wear oxygen at home.  She denies any sick contacts, chest pain, palpitations.  She denies any fevers or chills.  She claims to be compliant with her medications.  She didn't receive the pneumonia vaccine.    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 IV lasix started with good UOP and improvement of LE swelling. Continue to have dyspnea, tachypnea and sinus tachycardia. 2D ECHO, showed EF 60-65%, L atrial enlargement, diastolic dysfunction noted.  Elevated D-Dimer, CTA ordered to work up PE    Interval History: Report coughing spell leading to shortness of breath episode. Good UOP.     Review of Systems  Constitutional: Negative for chills, fatigue, fever.   HENT: Negative for sore throat, trouble swallowing.    Eyes: Negative for photophobia, visual disturbance.   Respiratory: + cough, shortness of breath.    Cardiovascular: Negative for chest pain, palpitations, leg swelling.   Gastrointestinal: Negative for abdominal pain, constipation, diarrhea, nausea, vomiting.   Endocrine: Negative for cold intolerance, heat intolerance.   Genitourinary: Negative for dysuria,  frequency.   Musculoskeletal: Negative for arthralgias, myalgias.   Skin: Negative for rash, wound   Neurological: Negative for dizziness, syncope, weakness, light-headedness.   Psychiatric/Behavioral: Negative for confusion, hallucinations, anxiety  All other systems reviewed and are negative.      Objective:     Vital Signs (Most Recent):  Temp: 97.7 °F (36.5 °C) (04/08/18 1152)  Pulse: 95 (04/08/18 1518)  Resp: 18 (04/08/18 1152)  BP: 109/65 (04/08/18 1152)  SpO2: (!) 92 % (04/08/18 1200) Vital Signs (24h Range):  Temp:  [97.7 °F (36.5 °C)-98.3 °F (36.8 °C)] 97.7 °F (36.5 °C)  Pulse:  [] 95  Resp:  [18-24] 18  SpO2:  [82 %-95 %] 92 %  BP: ()/(52-77) 109/65     Weight: 73.6 kg (162 lb 4.1 oz)  Body mass index is 27 kg/m².    Intake/Output Summary (Last 24 hours) at 04/08/18 1532  Last data filed at 04/08/18 1100   Gross per 24 hour   Intake             1370 ml   Output             3100 ml   Net            -1730 ml        Physical Exam  Constitutional: Appears well-developed and well-nourished.   Head: Normocephalic and atraumatic.   Mouth/Throat: Oropharynx is clear and moist.   Eyes: EOM are normal. Pupils are equal, round, and reactive to light. No scleral icterus.   Neck: Normal range of motion. Neck supple.   Cardiovascular: Normal rate and regular rhythm.  No murmur heard.  Pulmonary/Chest: On nasal cannula.  Rales in the LLL  Abdominal: Soft. Bowel sounds are normal.  No distension or tenderness  Musculoskeletal: Normal range of motion. Mild  edema BLE  Neurological: Alert and oriented to person, place, and time.   Skin: Skin is warm and dry.   Psychiatric: Normal mood and affect. Behavior is normal.      MELD-Na score: 8 at 4/7/2018  4:08 AM  MELD score: 8 at 4/7/2018  4:08 AM  Calculated from:  Serum Creatinine: 0.8 mg/dL (Rounded to 1) at 4/7/2018  4:08 AM  Serum Sodium: 137 mmol/L at 4/7/2018  4:08 AM  Total Bilirubin: 0.5 mg/dL (Rounded to 1) at 4/7/2018  4:08 AM  INR(ratio): 1.2 at 4/5/2018   5:15 PM  Age: 82 years    Significant Labs:  CBC:    Recent Labs  Lab 04/07/18  0408 04/08/18  0449   WBC 19.21* 32.11*   HGB 9.7* 11.4*   HCT 31.5* 36.4*    385*     CMP:    Recent Labs  Lab 04/07/18  0408 04/08/18  0449    135*   K 4.2 3.8    100   CO2 24 23   * 80   BUN 13 13   CREATININE 0.8 0.8   CALCIUM 8.8 8.8   PROT 6.4 6.8   ALBUMIN 2.1* 2.2*   BILITOT 0.5 0.7   ALKPHOS 130 137*   AST 22 42*   ALT 40 52*   ANIONGAP 11 12   EGFRNONAA >60.0 >60.0     PTINR:  No results for input(s): INR in the last 48 hours.    Significant Procedures:   Dobutamine Stress Test with Color Flow: No results found for this or any previous visit.         Assessment/Plan:      * Acute respiratory failure with hypoxia    - PNA combined with either CHF exacerbation vs PE  - PNA (as below)  - CHF exacerbation: pt with elevated BNP, LE swelling, last ECHO showed normal EF but diastolic dysfunction, 1x dose of IV lasix with good UOP, will continue  - 2D ECHO pending showed EF 60-65%, L atrial enlargement, diastolic dysfunction noted.  - On 2-4L NC at this time, Continue to wean   - Even with diuresis, pt resp has not gotten better  - Pt continue to have tachypnea and continue SOB, + d-dimer, discuss with patient, purse CTA chest to eval for PE and lung anatomy        Pneumonia due to Streptococcus pneumoniae    - 3/4 SIRS criteria:  HR >90, RR >20, WBC >12k in ED, Satting 92% on 2L NC  - Suspect lung as the cause (seen on CXR)  - Duonebs q4h while awake and prn  - Guaifenesin and Codeine cough syrup prn cough and Tesslon Perles  - Refused Pneumonia vaccine previously  - Continue Ceftriaxone 1g IV daily, Azithromycin 250mg PO daily            Seropositive rheumatoid arthritis of multiple sites    - Chronic and stable  - Continue Plaquenil 400mg PO daily  - Prednisone 5mg PO daily soon          Cervical dystonia    - Chronic and stable  - Continue Gabapentin 300mg PO TID  - Continue Baclofen 10mg PO TID           Cerebrovascular accident (CVA)    - Loves at home by herself  - PT/OT consulted: SNF          Vascular dementia    - Chronic and stable  - Continue Plavix 75mg PO daily             Hypertension, essential    - Chronic and stable  - Continue Norvasc 5mg PO daily             Fatty liver    - Chronic  - LFTs more elevated then normal  - Continue to monitor            VTE Risk Mitigation         Ordered     IP VTE HIGH RISK PATIENT  Once      04/05/18 2313     Place DIMPLE hose  Until discontinued      04/05/18 2210              Adama Bacon MD  Department of Hospital Medicine   [facility]

## 2018-04-09 PROBLEM — E04.9 THYROID ENLARGEMENT: Status: ACTIVE | Noted: 2018-04-09

## 2018-04-09 LAB
ALBUMIN SERPL BCP-MCNC: 2 G/DL
ALP SERPL-CCNC: 135 U/L
ALT SERPL W/O P-5'-P-CCNC: 72 U/L
ANION GAP SERPL CALC-SCNC: 11 MMOL/L
ANISOCYTOSIS BLD QL SMEAR: SLIGHT
AST SERPL-CCNC: 38 U/L
BASOPHILS # BLD AUTO: 0.05 K/UL
BASOPHILS NFR BLD: 0.2 %
BILIRUB SERPL-MCNC: 0.6 MG/DL
BUN SERPL-MCNC: 16 MG/DL
CALCIUM SERPL-MCNC: 8.5 MG/DL
CHLORIDE SERPL-SCNC: 100 MMOL/L
CO2 SERPL-SCNC: 26 MMOL/L
CREAT SERPL-MCNC: 0.9 MG/DL
DIFFERENTIAL METHOD: ABNORMAL
EOSINOPHIL # BLD AUTO: 0.8 K/UL
EOSINOPHIL NFR BLD: 3 %
ERYTHROCYTE [DISTWIDTH] IN BLOOD BY AUTOMATED COUNT: 15.4 %
EST. GFR  (AFRICAN AMERICAN): >60 ML/MIN/1.73 M^2
EST. GFR  (NON AFRICAN AMERICAN): >60 ML/MIN/1.73 M^2
GLUCOSE SERPL-MCNC: 81 MG/DL
HCT VFR BLD AUTO: 32.5 %
HGB BLD-MCNC: 10 G/DL
HYPOCHROMIA BLD QL SMEAR: ABNORMAL
IMM GRANULOCYTES # BLD AUTO: 0.19 K/UL
IMM GRANULOCYTES NFR BLD AUTO: 0.7 %
LYMPHOCYTES # BLD AUTO: 2 K/UL
LYMPHOCYTES NFR BLD: 7.8 %
MAGNESIUM SERPL-MCNC: 1.5 MG/DL
MCH RBC QN AUTO: 23.8 PG
MCHC RBC AUTO-ENTMCNC: 30.8 G/DL
MCV RBC AUTO: 77 FL
MONOCYTES # BLD AUTO: 3.9 K/UL
MONOCYTES NFR BLD: 15 %
NEUTROPHILS # BLD AUTO: 19.1 K/UL
NEUTROPHILS NFR BLD: 73.3 %
NRBC BLD-RTO: 0 /100 WBC
OVALOCYTES BLD QL SMEAR: ABNORMAL
PHOSPHATE SERPL-MCNC: 3.5 MG/DL
PLATELET # BLD AUTO: 417 K/UL
PMV BLD AUTO: 9.4 FL
POIKILOCYTOSIS BLD QL SMEAR: SLIGHT
POLYCHROMASIA BLD QL SMEAR: ABNORMAL
POTASSIUM SERPL-SCNC: 3.7 MMOL/L
PROT SERPL-MCNC: 6.4 G/DL
RBC # BLD AUTO: 4.21 M/UL
SODIUM SERPL-SCNC: 137 MMOL/L
WBC # BLD AUTO: 26.03 K/UL

## 2018-04-09 PROCEDURE — 25000242 PHARM REV CODE 250 ALT 637 W/ HCPCS: Performed by: HOSPITALIST

## 2018-04-09 PROCEDURE — 84100 ASSAY OF PHOSPHORUS: CPT

## 2018-04-09 PROCEDURE — 97110 THERAPEUTIC EXERCISES: CPT

## 2018-04-09 PROCEDURE — 25000003 PHARM REV CODE 250: Performed by: HOSPITALIST

## 2018-04-09 PROCEDURE — 94761 N-INVAS EAR/PLS OXIMETRY MLT: CPT

## 2018-04-09 PROCEDURE — 25500020 PHARM REV CODE 255: Performed by: HOSPITALIST

## 2018-04-09 PROCEDURE — 99232 SBSQ HOSP IP/OBS MODERATE 35: CPT | Mod: ,,, | Performed by: HOSPITALIST

## 2018-04-09 PROCEDURE — 25000003 PHARM REV CODE 250: Performed by: PHYSICIAN ASSISTANT

## 2018-04-09 PROCEDURE — 83735 ASSAY OF MAGNESIUM: CPT

## 2018-04-09 PROCEDURE — 85025 COMPLETE CBC W/AUTO DIFF WBC: CPT

## 2018-04-09 PROCEDURE — 36415 COLL VENOUS BLD VENIPUNCTURE: CPT

## 2018-04-09 PROCEDURE — 11000001 HC ACUTE MED/SURG PRIVATE ROOM

## 2018-04-09 PROCEDURE — 80053 COMPREHEN METABOLIC PANEL: CPT

## 2018-04-09 PROCEDURE — 27000221 HC OXYGEN, UP TO 24 HOURS

## 2018-04-09 PROCEDURE — 97535 SELF CARE MNGMENT TRAINING: CPT

## 2018-04-09 PROCEDURE — 63600175 PHARM REV CODE 636 W HCPCS: Performed by: HOSPITALIST

## 2018-04-09 PROCEDURE — 94640 AIRWAY INHALATION TREATMENT: CPT

## 2018-04-09 RX ORDER — TRAMADOL HYDROCHLORIDE 50 MG/1
50 TABLET ORAL ONCE
Status: COMPLETED | OUTPATIENT
Start: 2018-04-09 | End: 2018-04-09

## 2018-04-09 RX ORDER — FUROSEMIDE 10 MG/ML
40 INJECTION INTRAMUSCULAR; INTRAVENOUS DAILY
Status: DISCONTINUED | OUTPATIENT
Start: 2018-04-10 | End: 2018-04-09

## 2018-04-09 RX ORDER — POTASSIUM CHLORIDE 20 MEQ/1
20 TABLET, EXTENDED RELEASE ORAL ONCE
Status: COMPLETED | OUTPATIENT
Start: 2018-04-09 | End: 2018-04-09

## 2018-04-09 RX ORDER — LANOLIN ALCOHOL/MO/W.PET/CERES
400 CREAM (GRAM) TOPICAL ONCE
Status: COMPLETED | OUTPATIENT
Start: 2018-04-09 | End: 2018-04-09

## 2018-04-09 RX ORDER — FUROSEMIDE 10 MG/ML
20 INJECTION INTRAMUSCULAR; INTRAVENOUS ONCE
Status: COMPLETED | OUTPATIENT
Start: 2018-04-09 | End: 2018-04-09

## 2018-04-09 RX ORDER — FUROSEMIDE 10 MG/ML
20 INJECTION INTRAMUSCULAR; INTRAVENOUS DAILY
Status: DISCONTINUED | OUTPATIENT
Start: 2018-04-10 | End: 2018-04-11

## 2018-04-09 RX ORDER — CEFTRIAXONE 1 G/1
1 INJECTION, POWDER, FOR SOLUTION INTRAMUSCULAR; INTRAVENOUS
Status: COMPLETED | OUTPATIENT
Start: 2018-04-09 | End: 2018-04-09

## 2018-04-09 RX ADMIN — ENOXAPARIN SODIUM 40 MG: 100 INJECTION SUBCUTANEOUS at 04:04

## 2018-04-09 RX ADMIN — MAGNESIUM OXIDE TAB 400 MG (241.3 MG ELEMENTAL MG) 400 MG: 400 (241.3 MG) TAB at 06:04

## 2018-04-09 RX ADMIN — BENZONATATE 200 MG: 100 CAPSULE ORAL at 09:04

## 2018-04-09 RX ADMIN — CLOPIDOGREL 75 MG: 75 TABLET, FILM COATED ORAL at 09:04

## 2018-04-09 RX ADMIN — BACLOFEN 10 MG: 10 TABLET ORAL at 09:04

## 2018-04-09 RX ADMIN — FUROSEMIDE 20 MG: 10 INJECTION, SOLUTION INTRAMUSCULAR; INTRAVENOUS at 04:04

## 2018-04-09 RX ADMIN — IPRATROPIUM BROMIDE AND ALBUTEROL SULFATE 3 ML: .5; 3 SOLUTION RESPIRATORY (INHALATION) at 04:04

## 2018-04-09 RX ADMIN — GABAPENTIN 300 MG: 300 CAPSULE ORAL at 09:04

## 2018-04-09 RX ADMIN — PREDNISONE 5 MG: 5 TABLET ORAL at 10:04

## 2018-04-09 RX ADMIN — AZITHROMYCIN 250 MG: 250 TABLET, FILM COATED ORAL at 10:04

## 2018-04-09 RX ADMIN — BENZONATATE 200 MG: 100 CAPSULE ORAL at 04:04

## 2018-04-09 RX ADMIN — MONTELUKAST SODIUM 10 MG: 10 TABLET, FILM COATED ORAL at 10:04

## 2018-04-09 RX ADMIN — HYDROXYCHLOROQUINE SULFATE 400 MG: 200 TABLET, FILM COATED ORAL at 10:04

## 2018-04-09 RX ADMIN — GABAPENTIN 300 MG: 300 CAPSULE ORAL at 10:04

## 2018-04-09 RX ADMIN — BACLOFEN 10 MG: 10 TABLET ORAL at 10:04

## 2018-04-09 RX ADMIN — AMLODIPINE BESYLATE 5 MG: 5 TABLET ORAL at 10:04

## 2018-04-09 RX ADMIN — GABAPENTIN 300 MG: 300 CAPSULE ORAL at 04:04

## 2018-04-09 RX ADMIN — BENZONATATE 200 MG: 100 CAPSULE ORAL at 10:04

## 2018-04-09 RX ADMIN — POTASSIUM CHLORIDE 20 MEQ: 1500 TABLET, EXTENDED RELEASE ORAL at 06:04

## 2018-04-09 RX ADMIN — CEFTRIAXONE SODIUM 1 G: 1 INJECTION, POWDER, FOR SOLUTION INTRAMUSCULAR; INTRAVENOUS at 06:04

## 2018-04-09 RX ADMIN — IPRATROPIUM BROMIDE AND ALBUTEROL SULFATE 3 ML: .5; 3 SOLUTION RESPIRATORY (INHALATION) at 07:04

## 2018-04-09 RX ADMIN — BACLOFEN 10 MG: 10 TABLET ORAL at 04:04

## 2018-04-09 RX ADMIN — TRAMADOL HYDROCHLORIDE 50 MG: 50 TABLET, FILM COATED ORAL at 10:04

## 2018-04-09 RX ADMIN — LUBIPROSTONE 24 MCG: 24 CAPSULE, GELATIN COATED ORAL at 10:04

## 2018-04-09 RX ADMIN — PANTOPRAZOLE SODIUM 40 MG: 40 TABLET, DELAYED RELEASE ORAL at 10:04

## 2018-04-09 NOTE — PLAN OF CARE
Problem: Occupational Therapy Goal  Goal: Occupational Therapy Goal  Goals to be met by: 4/30/18     Patient will increase functional independence with ADLs by performing:    UE Dressing with Supervision.  LE Dressing with Stand-by Assistance.  Grooming while standing at sink with Stand-by Assistance.  Toileting from toilet with Supervision for hygiene and clothing management.   Supine to sit with Modified [place].  Stand pivot transfers with Stand-by Assistance.     Continue OT POC    Comments: Lb Anglin OTR/L  4/9/2018

## 2018-04-09 NOTE — PLAN OF CARE
Problem: Physical Therapy Goal  Goal: Physical Therapy Goal  Goals to be met by: 18     Patient will increase functional independence with mobility by performin. Supine to sit with Stand-by Assistance  2. Sit to supine with Stand-by Assistance  3. Sit to stand transfer with Stand-by Assistance  4. Bed to chair transfer with Stand-by Assistance using LRAD  5. Gait  x 50 feet with Contact Guard Assistance using LRAD    6. Ascend/descend 5 stair with bilateral Handrails Contact Guard Assistance using Single-point Cane .   7. Lower extremity exercise program x20 reps per handout, with supervision       Discharge Recommendations: Short stay SNF    Pt safe to transfer OOB with RN/PCT via SPT: Use SC/no AD with CGA of 1 person.    Goals remain appropriate.     Amla Delia Harrington, PTA.   [phone]   2018

## 2018-04-09 NOTE — PROGRESS NOTES
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  She mentions that for 2 weeks, she has been having a dry cough.  She endorses worsening SOB and fatigue.  She went to see her PCP who started her on an inhaler.  She was given Tessalon Perles which have helped her cough.  She doesn't wear oxygen at home.  She denies any sick contacts, chest pain, palpitations.  She denies any fevers or chills.  She claims to be compliant with her medications.  She didn't receive the pneumonia vaccine.    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 IV lasix started with good UOP and improvement of LE swelling. Continue to have dyspnea, tachypnea and sinus tachycardia. 2D ECHO, showed EF 60-65%, L atrial enlargement, diastolic dysfunction noted.  Elevated D-Dimer, CTA negative for PE.     Interval History: abd pain this AM leading to 1x N/V. Doing well this afternoon, tolerating diet.     Review of Systems  Constitutional: Negative for chills, fatigue, fever.   HENT: Negative for sore throat, trouble swallowing.    Eyes: Negative for photophobia, visual disturbance.   Respiratory: + cough, shortness of breath.    Cardiovascular: Negative for chest pain, palpitations, leg swelling.   Gastrointestinal: Negative for abdominal pain, constipation, diarrhea, nausea, vomiting.   Endocrine: Negative for cold intolerance, heat intolerance.   Genitourinary: Negative for  dysuria, frequency.   Musculoskeletal: Negative for arthralgias, myalgias.   Skin: Negative for rash, wound   Neurological: Negative for dizziness, syncope, weakness, light-headedness.   Psychiatric/Behavioral: Negative for confusion, hallucinations, anxiety  All other systems reviewed and are negative.      Objective:     Vital Signs (Most Recent):  Temp: 98.9 °F (37.2 °C) (04/09/18 1659)  Pulse: 99 (04/09/18 1659)  Resp: 16 (04/09/18 1659)  BP: (!) 110/51 (04/09/18 1659)  SpO2: 96 % (04/09/18 1634) Vital Signs (24h Range):  Temp:  [97.9 °F (36.6 °C)-100.2 °F (37.9 °C)] 98.9 °F (37.2 °C)  Pulse:  [] 99  Resp:  [14-26] 16  SpO2:  [88 %-98 %] 96 %  BP: (110-150)/(51-71) 110/51     Weight: 73.6 kg (162 lb 4.1 oz)  Body mass index is 27 kg/m².    Intake/Output Summary (Last 24 hours) at 04/09/18 1732  Last data filed at 04/09/18 0600   Gross per 24 hour   Intake              250 ml   Output              800 ml   Net             -550 ml        Physical Exam  Constitutional: Appears well-developed and well-nourished.   Head: Normocephalic and atraumatic.   Mouth/Throat: Oropharynx is clear and moist.   Eyes: EOM are normal. Pupils are equal, round, and reactive to light. No scleral icterus.   Neck: Normal range of motion. Neck supple.   Cardiovascular: Normal rate and regular rhythm.  No murmur heard.  Pulmonary/Chest: On nasal cannula.  Rales in the LLL  Abdominal: Soft. Bowel sounds are normal.  No distension or tenderness  Musculoskeletal: Normal range of motion. Mild  edema BLE  Neurological: Alert and oriented to person, place, and time.   Skin: Skin is warm and dry.   Psychiatric: Normal mood and affect. Behavior is normal.      MELD-Na score: 8 at 4/7/2018  4:08 AM  MELD score: 8 at 4/7/2018  4:08 AM  Calculated from:  Serum Creatinine: 0.8 mg/dL (Rounded to 1) at 4/7/2018  4:08 AM  Serum Sodium: 137 mmol/L at 4/7/2018  4:08 AM  Total Bilirubin: 0.5 mg/dL (Rounded to 1) at 4/7/2018  4:08 AM  INR(ratio): 1.2  at 4/5/2018  5:15 PM  Age: 82 years    Significant Labs:  CBC:    Recent Labs  Lab 04/08/18  0449 04/09/18  0455   WBC 32.11* 26.03*   HGB 11.4* 10.0*   HCT 36.4* 32.5*   * 417*     CMP:    Recent Labs  Lab 04/08/18  0449 04/09/18  0455   * 137   K 3.8 3.7    100   CO2 23 26   GLU 80 81   BUN 13 16   CREATININE 0.8 0.9   CALCIUM 8.8 8.5*   PROT 6.8 6.4   ALBUMIN 2.2* 2.0*   BILITOT 0.7 0.6   ALKPHOS 137* 135   AST 42* 38   ALT 52* 72*   ANIONGAP 12 11   EGFRNONAA >60.0 >60.0     PTINR:  No results for input(s): INR in the last 48 hours.    Significant Procedures:   Dobutamine Stress Test with Color Flow: No results found for this or any previous visit.         Assessment/Plan:      * Acute respiratory failure with hypoxia    - PNA combined with either CHF exacerbation vs PE  - PNA (as below)  - CHF exacerbation: pt with elevated BNP, LE swelling, last ECHO showed normal EF but diastolic dysfunction, 1x dose of IV lasix with good UOP, will continue  - 2D ECHO pending showed EF 60-65%, L atrial enlargement, diastolic dysfunction noted.  - On 2-4L NC at this time, Continue to wean   - Pt continue to have tachypnea and continue SOB, + d-dimer, CTA chest - PE  - Continue treatment of PNA (5 days total) and IV lasix for now            Pneumonia due to Streptococcus pneumoniae    - 3/4 SIRS criteria:  HR >90, RR >20, WBC >12k in ED, Satting 92% on 2L NC  - Suspect lung as the cause (seen on CXR)  - Duonebs q4h while awake and prn  - Guaifenesin and Codeine cough syrup prn cough and Tesslon Perles  - Refused Pneumonia vaccine previously  - Continue Ceftriaxone 1g IV daily, Azithromycin 250mg PO daily for 5 days, last dose 4/9/2018  - WBC trending down, CTM              Seropositive rheumatoid arthritis of multiple sites    - Chronic and stable  - Continue Plaquenil 400mg PO daily  - Prednisone 5mg PO daily           Thyroid enlargement    - CT showed: 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 There is subsequent mass effect on the adjacent trachea.   - Chronic  - similar to prior CT neck of 05/31/2016          Cervical dystonia    - Chronic and stable  - Continue Gabapentin 300mg PO TID  - Continue Baclofen 10mg PO TID          Cerebrovascular accident (CVA)    - Loves at home by herself  - PT/OT consulted: [facility]          Vascular dementia    - Chronic and stable  - Continue Plavix 75mg PO daily             Hypertension, essential    - Chronic and stable  - Continue Norvasc 5mg PO daily             Fatty liver    - Chronic  - LFTs more elevated then normal  - Continue to monitor            VTE Risk Mitigation         Ordered     enoxaparin injection 40 mg  Daily     Route:  Subcutaneous        04/08/18 1537     IP VTE HIGH RISK PATIENT  Once      04/05/18 2313     Place DIMPLE hose  Until discontinued      04/05/18 2210        Dispo: to [facility] soon when medically stable      Adama Bacon MD  Department of Hospital Medicine   [facility]

## 2018-04-09 NOTE — ASSESSMENT & PLAN NOTE
- 3/4 SIRS criteria:  HR >90, RR >20, WBC >12k in ED, Satting 92% on 2L NC  - Suspect lung as the cause (seen on CXR)  - Duonebs q4h while awake and prn  - Guaifenesin and Codeine cough syrup prn cough and Tesslon Perles  - Refused Pneumonia vaccine previously  - Continue Ceftriaxone 1g IV daily, Azithromycin 250mg PO daily for 5 days, last dose 4/9/2018  - WBC trending down, CTM

## 2018-04-09 NOTE — SUBJECTIVE & OBJECTIVE
Interval History: abd pain this AM leading to 1x N/V. Doing well this afternoon, tolerating diet.     Review of Systems  Constitutional: Negative for chills, fatigue, fever.   HENT: Negative for sore throat, trouble swallowing.    Eyes: Negative for photophobia, visual disturbance.   Respiratory: + cough, shortness of breath.    Cardiovascular: Negative for chest pain, palpitations, leg swelling.   Gastrointestinal: Negative for abdominal pain, constipation, diarrhea, nausea, vomiting.   Endocrine: Negative for cold intolerance, heat intolerance.   Genitourinary: Negative for dysuria, frequency.   Musculoskeletal: Negative for arthralgias, myalgias.   Skin: Negative for rash, wound   Neurological: Negative for dizziness, syncope, weakness, light-headedness.   Psychiatric/Behavioral: Negative for confusion, hallucinations, anxiety  All other systems reviewed and are negative.      Objective:     Vital Signs (Most Recent):  Temp: 98.9 °F (37.2 °C) (04/09/18 1659)  Pulse: 99 (04/09/18 1659)  Resp: 16 (04/09/18 1659)  BP: (!) 110/51 (04/09/18 1659)  SpO2: 96 % (04/09/18 1634) Vital Signs (24h Range):  Temp:  [97.9 °F (36.6 °C)-100.2 °F (37.9 °C)] 98.9 °F (37.2 °C)  Pulse:  [] 99  Resp:  [14-26] 16  SpO2:  [88 %-98 %] 96 %  BP: (110-150)/(51-71) 110/51     Weight: 73.6 kg (162 lb 4.1 oz)  Body mass index is 27 kg/m².    Intake/Output Summary (Last 24 hours) at 04/09/18 1732  Last data filed at 04/09/18 0600   Gross per 24 hour   Intake              250 ml   Output              800 ml   Net             -550 ml        Physical Exam  Constitutional: Appears well-developed and well-nourished.   Head: Normocephalic and atraumatic.   Mouth/Throat: Oropharynx is clear and moist.   Eyes: EOM are normal. Pupils are equal, round, and reactive to light. No scleral icterus.   Neck: Normal range of motion. Neck supple.   Cardiovascular: Normal rate and regular rhythm.  No murmur heard.  Pulmonary/Chest: On nasal cannula.  Rales  in the LLL  Abdominal: Soft. Bowel sounds are normal.  No distension or tenderness  Musculoskeletal: Normal range of motion. Mild  edema BLE  Neurological: Alert and oriented to person, place, and time.   Skin: Skin is warm and dry.   Psychiatric: Normal mood and affect. Behavior is normal.      MELD-Na score: 8 at 4/7/2018  4:08 AM  MELD score: 8 at 4/7/2018  4:08 AM  Calculated from:  Serum Creatinine: 0.8 mg/dL (Rounded to 1) at 4/7/2018  4:08 AM  Serum Sodium: 137 mmol/L at 4/7/2018  4:08 AM  Total Bilirubin: 0.5 mg/dL (Rounded to 1) at 4/7/2018  4:08 AM  INR(ratio): 1.2 at 4/5/2018  5:15 PM  Age: 82 years    Significant Labs:  CBC:    Recent Labs  Lab 04/08/18  0449 04/09/18  0455   WBC 32.11* 26.03*   HGB 11.4* 10.0*   HCT 36.4* 32.5*   * 417*     CMP:    Recent Labs  Lab 04/08/18  0449 04/09/18  0455   * 137   K 3.8 3.7    100   CO2 23 26   GLU 80 81   BUN 13 16   CREATININE 0.8 0.9   CALCIUM 8.8 8.5*   PROT 6.8 6.4   ALBUMIN 2.2* 2.0*   BILITOT 0.7 0.6   ALKPHOS 137* 135   AST 42* 38   ALT 52* 72*   ANIONGAP 12 11   EGFRNONAA >60.0 >60.0     PTINR:  No results for input(s): INR in the last 48 hours.    Significant Procedures:   Dobutamine Stress Test with Color Flow: No results found for this or any previous visit.

## 2018-04-09 NOTE — PLAN OF CARE
Problem: Patient Care Overview  Goal: Plan of Care Review  Pt resting quietly in bed with no complaints. Pt responds well to the Phenergan with codeine at Hs. Bed alarm on. VS stable. Will continue to monitor patient.

## 2018-04-09 NOTE — PLAN OF CARE
Problem: Patient Care Overview  Goal: Plan of Care Review  Outcome: Ongoing (interventions implemented as appropriate)  Patient is AAOx4. Vital signs stable. No falls throughout shift. Tramadol administered for pain to left groin and abdomen. Abdominal xray and ultrasound of bilateral lower extremity veins done today. One episode of emesis today.

## 2018-04-09 NOTE — ASSESSMENT & PLAN NOTE
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 There is subsequent mass effect on the adjacent trachea.   - Chronic  - similar to prior CT neck of 05/31/2016

## 2018-04-09 NOTE — ASSESSMENT & PLAN NOTE
- PNA combined with either CHF exacerbation vs PE  - PNA (as below)  - CHF exacerbation: pt with elevated BNP, LE swelling, last ECHO showed normal EF but diastolic dysfunction, 1x dose of IV lasix with good UOP, will continue  - 2D ECHO pending showed EF 60-65%, L atrial enlargement, diastolic dysfunction noted.  - On 2-4L NC at this time, Continue to wean   - Pt continue to have tachypnea and continue SOB, + d-dimer, CTA chest - PE  - Continue treatment of PNA (5 days total) and IV lasix for now

## 2018-04-09 NOTE — PT/OT/SLP PROGRESS
Occupational Therapy   Treatment    Name: Selma Lux MD  MRN: [id number]  Admitting Diagnosis:  Acute respiratory failure with hypoxia       Recommendations:     Discharge Recommendations: nursing facility, skilled  Discharge Equipment Recommendations:   (tbd)  Barriers to discharge:  Decreased caregiver support    Subjective     Communicated with: RN prior to session.  Pain/Comfort:  · Pain Rating 1: 0/10  · Pain Rating Post-Intervention 1: 0/10    Patients cultural, spiritual, Judaism conflicts given the current situation: none stated    Objective:     Patient found with: telemetry, oxygen    General Precautions: Standard, fall   Orthopedic Precautions:N/A   Braces: N/A     Occupational Performance:    Bed Mobility:    · Patient completed Rolling/Turning to Left with  stand by assistance  · Patient completed Scooting/Bridging with stand by assistance  · Patient completed Supine to Sit with contact guard assistance     Functional Mobility/Transfers:  · Patient completed Sit <> Stand Transfer with contact guard assistance  with  straight cane   · Patient completed Bed <> Chair Transfer using Stand Pivot technique with contact guard assistance with straight cane  · Patient completed Toilet Transfer Stand Pivot technique with contact guard assistance with  bedside commode and straight cane  · Functional Mobility: Pt ambulated ~20 ft at cga w/ SPC. Pt w/ unsteady gait causing pt to be a high fall risk.     Activities of Daily Living:  · Grooming: contact guard assistance oral and facial hygiene   · UB Dressing: moderate assistance donned gown as robe; mod a 2* decreaed LUE AROM    Patient left up in chair with all lines intact and call button in reach    AMPA 6 Click:  AMPA Total Score: 17    Treatment & Education:  Pt performed BUE strengthening of chest press for 3x10 reps w/ 3 lb dowel.   Pt performed BUE strengthening of reverse bicep curls for 3x10 reps w/ 3 lb dowel.   Pt performed BUE strengthening  of bicep curls for 3x10 reps w/ 3 lb dowel.  Pt performed BUE AROM of scapular retractions.       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  She presents with impairments listed below. Pt did well to tolerate and participate in session. Pt's decreased endurance and strength makes pt a major fall risk ;and hinder's pt's ability to safely compete ADLs and oob activities. Pt would benefit from skilled OT services to improve independence and overall occupational functioning.      Performance deficits affecting function are weakness, impaired endurance, impaired self care skills, impaired functional mobilty, gait instability, impaired balance, decreased lower extremity function, decreased upper extremity function, decreased safety awareness, decreased ROM.      Rehab Prognosis:  good; patient would benefit from acute skilled OT services to address these deficits and reach maximum level of function.       Plan:     Patient to be seen 3 x/week to address the above listed problems via self-care/home management, therapeutic activities, therapeutic exercises  · Plan of Care Expires: 05/06/18  · Plan of Care Reviewed with: patient    This Plan of care has been discussed with the patient who was involved in its development and understands and is in agreement with the identified goals and treatment plan    GOALS:    Occupational Therapy Goals        Problem: Occupational Therapy Goal    Goal Priority Disciplines Outcome Interventions   Occupational Therapy Goal     OT, PT/OT Ongoing (interventions implemented as appropriate)    Description:  Goals to be met by: 4/30/18     Patient will increase functional independence with ADLs by performing:    UE Dressing with Supervision.  LE Dressing with Stand-by Assistance.  Grooming while standing at sink with Stand-by Assistance.  Toileting from toilet with Supervision for hygiene and  clothing management.   Supine to sit with Modified [place].  Stand pivot transfers with Stand-by Assistance.                      Time Tracking:     OT Date of Treatment: 04/09/18  OT Start Time: 1357  OT Stop Time: 1431  OT Total Time (min): 34 min    Billable Minutes:Self Care/Home Management 15 minutes  Therapeutic Exercise 12 minutes    Lb Anglni, OT  4/9/2018

## 2018-04-09 NOTE — PT/OT/SLP PROGRESS
"Physical Therapy Treatment    Patient Name:  Selma Alonzo Lux MD   MRN:  [id number]  Admitting Diagnosis: Acute respiratory failure with hypoxia  Recent Surgery: * No surgery found *      Recommendations:     Discharge Recommendations:  nursing facility, skilled (short stay)   Discharge Equipment Recommendations:  (TBD)   Barriers to discharge: Inaccessible home and Decreased caregiver support    Plan:     During this hospitalization, patient to be seen 4 x/week to address the above listed problems via gait training, therapeutic activities, therapeutic exercises, neuromuscular re-education  · Plan of Care Expires:  05/06/18   Plan of Care Reviewed with: patient    This Plan of care has been discussed with the patient who was involved in its development and understands and is in agreement with the identified goals and treatment plan    Subjective     Communicated with RN (Jessica) prior to session.     Patient comments: "I'm going to try to do the steps"  Pain/Comfort:  · Pain Rating 1: 0/10  · Pain Rating Post-Intervention 1: 0/10    Objective:     Patient found with: telemetry (NC in place, however, no O2 flowing 2* dial turned to 0L/min.  RN was notified and she requested that PTA turned dial to 4L/min)    Patient found sup in bed upon PT entry to room, agreeable to treatment.  No family present in the room.    General Precautions: Standard, Cardiac fall   Orthopedic Precautions:N/A   Braces: N/A       BED MOBILITY (with vc's for sequencing and safe technique of functional mobility):        Sup > sit at the EOB with mod/min A from L side lying        Sit > sup Not performed 2* pt left seated UIC          TRANSFERS   Patient completed Sit <> Stand Transfer from EOB with CGA with straight cane    Patient completed Stand <> Sit Transfer to BS chair with CGA with straight cane      GAIT:   Patient ambulated: 126ft   Patient required: CGA to SBA   Patient used:  Straight Cane   Gait Pattern observed: reciprocal " gait   Gait Deviation(s): decreased dora, increased time in double stance, decreased velocity of limb motion, decreased step length, decreased stride length, decreased toe-to-floor clearance and decreased weight-shifting ability    Comments: vc's for upright posture      STAIRS  Pt ascended/descended 5 stair(s) with No Assistive Device with bilateral handrails with Contact Guard Assistance with vc's for   Sequencing of LE's, hand placement, speed of task and safety.       EDUCATION  Education provided to pt regarding: stair training, upright posture and importance and benefits of performing exercises and functional mobility.    Whiteboard updated with correct mobility information. RN/PCT notified.  Pt safe to transfer OOB with RN/PCT via SPT: Use SC/no AD with CGA of 1 person.    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  She presents with the following impairments/functional limitations:  weakness, impaired endurance, impaired self care skills, impaired functional mobilty, gait instability, impaired balance, decreased upper extremity function, decreased lower extremity function, decreased safety awareness, impaired cardiopulmonary response to activity. requiring assistance and verbal cues for bed mob, transfers, gait and stair to prevent falls during session 2* weakness, fatigue.   In light of pt's current  functional level and deficits, it is anticipated that pt will need to participate in an intense rehab program consisting of PT and OT in order to achieve full rehab potential to return to previous level of function and roles.  Pt will cont to benefit from skilled PT intervention to address deficits and improve functional mobility.     Rehab Prognosis:  Fair to Good; patient would benefit from acute skilled PT services to address these deficits and reach maximum level of function.      GOALS:    Physical Therapy Goals        Problem: Physical Therapy Goal    Goal Priority Disciplines Outcome Goal Variances Interventions   Physical Therapy Goal     PT/OT, PT Ongoing (interventions implemented as appropriate)     Description:  Goals to be met by: 18     Patient will increase functional independence with mobility by performin. Supine to sit with Stand-by Assistance  2. Sit to supine with Stand-by Assistance  3. Sit to stand transfer with Stand-by Assistance  4. Bed to chair transfer with Stand-by Assistance using LRAD  5. Gait  x 50 feet with Contact Guard Assistance using LRAD    6. Ascend/descend 5 stair with bilateral Handrails Contact Guard Assistance using Single-point Cane .   7. Lower extremity exercise program x20 reps per handout, with supervision                      Time Tracking:     PT Received On: 18  PT Start Time: 1512     PT Stop Time: 1539  PT Total Time (min): 27 min     Billable Minutes: Gait Training 19 and Therapeutic Activity 8    Treatment Type: Treatment  PT/PTA: PTA     PTA Visit Number: 1       Alma Delia Harrington PTA.  Pager [phone]    2018    .

## 2018-04-09 NOTE — PLAN OF CARE
Dc plan is the same: O SNF when med stable.     04/09/18 1152   Right Care Assessment   Can the patient answer the patient profile reliably? Yes, cognitively intact   How often would a person be available to care for the patient? Often   Describe the patient's ability to walk at the present time. Walks with the help of equipment   How does the patient rate their overall health at the present time? Good   Number of comorbid conditions (as recorded on the chart) One   During the past month, has the patient often been bothered by feeling down, depressed or hopeless? No   During the past month, has the patient often been bothered by little interest or pleasure in doing things? No

## 2018-04-10 LAB
ALBUMIN SERPL BCP-MCNC: 2 G/DL
ALP SERPL-CCNC: 117 U/L
ALT SERPL W/O P-5'-P-CCNC: 43 U/L
ANION GAP SERPL CALC-SCNC: 10 MMOL/L
ANISOCYTOSIS BLD QL SMEAR: SLIGHT
AST SERPL-CCNC: 14 U/L
BACTERIA BLD CULT: NORMAL
BACTERIA BLD CULT: NORMAL
BASOPHILS # BLD AUTO: 0.06 K/UL
BASOPHILS NFR BLD: 0.2 %
BILIRUB SERPL-MCNC: 0.6 MG/DL
BILIRUB UR QL STRIP: NEGATIVE
BUN SERPL-MCNC: 15 MG/DL
C DIFF GDH STL QL: NEGATIVE
C DIFF TOX A+B STL QL IA: NEGATIVE
CALCIUM SERPL-MCNC: 8.5 MG/DL
CHLORIDE SERPL-SCNC: 97 MMOL/L
CLARITY UR REFRACT.AUTO: CLEAR
CO2 SERPL-SCNC: 25 MMOL/L
COLOR UR AUTO: YELLOW
CREAT SERPL-MCNC: 0.9 MG/DL
DIFFERENTIAL METHOD: ABNORMAL
EOSINOPHIL # BLD AUTO: 0.8 K/UL
EOSINOPHIL NFR BLD: 2.7 %
ERYTHROCYTE [DISTWIDTH] IN BLOOD BY AUTOMATED COUNT: 15.5 %
EST. GFR  (AFRICAN AMERICAN): >60 ML/MIN/1.73 M^2
EST. GFR  (NON AFRICAN AMERICAN): >60 ML/MIN/1.73 M^2
GLUCOSE SERPL-MCNC: 94 MG/DL
GLUCOSE UR QL STRIP: NEGATIVE
HCT VFR BLD AUTO: 31.7 %
HGB BLD-MCNC: 9.6 G/DL
HGB UR QL STRIP: NEGATIVE
HYPOCHROMIA BLD QL SMEAR: ABNORMAL
IMM GRANULOCYTES # BLD AUTO: 0.32 K/UL
IMM GRANULOCYTES NFR BLD AUTO: 1.1 %
KETONES UR QL STRIP: NEGATIVE
LEUKOCYTE ESTERASE UR QL STRIP: NEGATIVE
LYMPHOCYTES # BLD AUTO: 1.7 K/UL
LYMPHOCYTES NFR BLD: 5.8 %
MAGNESIUM SERPL-MCNC: 1.5 MG/DL
MCH RBC QN AUTO: 23.4 PG
MCHC RBC AUTO-ENTMCNC: 30.3 G/DL
MCV RBC AUTO: 77 FL
MONOCYTES # BLD AUTO: 4.5 K/UL
MONOCYTES NFR BLD: 15 %
NEUTROPHILS # BLD AUTO: 22.6 K/UL
NEUTROPHILS NFR BLD: 75.2 %
NITRITE UR QL STRIP: NEGATIVE
NRBC BLD-RTO: 0 /100 WBC
OVALOCYTES BLD QL SMEAR: ABNORMAL
PH UR STRIP: 5 [PH] (ref 5–8)
PHOSPHATE SERPL-MCNC: 3 MG/DL
PLATELET # BLD AUTO: 381 K/UL
PMV BLD AUTO: 9.4 FL
POIKILOCYTOSIS BLD QL SMEAR: SLIGHT
POLYCHROMASIA BLD QL SMEAR: ABNORMAL
POTASSIUM SERPL-SCNC: 4.2 MMOL/L
PROT SERPL-MCNC: 6.2 G/DL
PROT UR QL STRIP: NEGATIVE
RBC # BLD AUTO: 4.1 M/UL
SODIUM SERPL-SCNC: 132 MMOL/L
SP GR UR STRIP: 1 (ref 1–1.03)
URN SPEC COLLECT METH UR: NORMAL
UROBILINOGEN UR STRIP-ACNC: NEGATIVE EU/DL
WBC # BLD AUTO: 30.05 K/UL

## 2018-04-10 PROCEDURE — 27000221 HC OXYGEN, UP TO 24 HOURS

## 2018-04-10 PROCEDURE — 85025 COMPLETE CBC W/AUTO DIFF WBC: CPT

## 2018-04-10 PROCEDURE — 84100 ASSAY OF PHOSPHORUS: CPT

## 2018-04-10 PROCEDURE — 83735 ASSAY OF MAGNESIUM: CPT

## 2018-04-10 PROCEDURE — 87086 URINE CULTURE/COLONY COUNT: CPT

## 2018-04-10 PROCEDURE — 25000003 PHARM REV CODE 250: Performed by: PHYSICIAN ASSISTANT

## 2018-04-10 PROCEDURE — 36415 COLL VENOUS BLD VENIPUNCTURE: CPT

## 2018-04-10 PROCEDURE — 25000242 PHARM REV CODE 250 ALT 637 W/ HCPCS: Performed by: HOSPITALIST

## 2018-04-10 PROCEDURE — 94640 AIRWAY INHALATION TREATMENT: CPT

## 2018-04-10 PROCEDURE — 25000003 PHARM REV CODE 250: Performed by: HOSPITALIST

## 2018-04-10 PROCEDURE — 99232 SBSQ HOSP IP/OBS MODERATE 35: CPT | Mod: ,,, | Performed by: HOSPITALIST

## 2018-04-10 PROCEDURE — 11000001 HC ACUTE MED/SURG PRIVATE ROOM

## 2018-04-10 PROCEDURE — 63600175 PHARM REV CODE 636 W HCPCS: Performed by: HOSPITALIST

## 2018-04-10 PROCEDURE — 94761 N-INVAS EAR/PLS OXIMETRY MLT: CPT

## 2018-04-10 PROCEDURE — 80053 COMPREHEN METABOLIC PANEL: CPT

## 2018-04-10 PROCEDURE — 81003 URINALYSIS AUTO W/O SCOPE: CPT

## 2018-04-10 PROCEDURE — 87449 NOS EACH ORGANISM AG IA: CPT

## 2018-04-10 RX ADMIN — CLOPIDOGREL 75 MG: 75 TABLET, FILM COATED ORAL at 09:04

## 2018-04-10 RX ADMIN — FUROSEMIDE 20 MG: 10 INJECTION, SOLUTION INTRAMUSCULAR; INTRAVENOUS at 09:04

## 2018-04-10 RX ADMIN — AMPICILLIN SODIUM AND SULBACTAM SODIUM 3 G: 2; 1 INJECTION, POWDER, FOR SOLUTION INTRAMUSCULAR; INTRAVENOUS at 06:04

## 2018-04-10 RX ADMIN — POLYETHYLENE GLYCOL 3350 17 G: 17 POWDER, FOR SOLUTION ORAL at 09:04

## 2018-04-10 RX ADMIN — AMPICILLIN SODIUM AND SULBACTAM SODIUM 3 G: 2; 1 INJECTION, POWDER, FOR SOLUTION INTRAMUSCULAR; INTRAVENOUS at 12:04

## 2018-04-10 RX ADMIN — IPRATROPIUM BROMIDE AND ALBUTEROL SULFATE 3 ML: .5; 3 SOLUTION RESPIRATORY (INHALATION) at 07:04

## 2018-04-10 RX ADMIN — IPRATROPIUM BROMIDE AND ALBUTEROL SULFATE 3 ML: .5; 3 SOLUTION RESPIRATORY (INHALATION) at 08:04

## 2018-04-10 RX ADMIN — BENZONATATE 200 MG: 100 CAPSULE ORAL at 09:04

## 2018-04-10 RX ADMIN — BENZONATATE 200 MG: 100 CAPSULE ORAL at 03:04

## 2018-04-10 RX ADMIN — PREDNISONE 5 MG: 5 TABLET ORAL at 12:04

## 2018-04-10 RX ADMIN — IPRATROPIUM BROMIDE AND ALBUTEROL SULFATE 3 ML: .5; 3 SOLUTION RESPIRATORY (INHALATION) at 03:04

## 2018-04-10 RX ADMIN — HYDROXYCHLOROQUINE SULFATE 400 MG: 200 TABLET, FILM COATED ORAL at 12:04

## 2018-04-10 RX ADMIN — LUBIPROSTONE 24 MCG: 24 CAPSULE, GELATIN COATED ORAL at 12:04

## 2018-04-10 RX ADMIN — AMLODIPINE BESYLATE 5 MG: 5 TABLET ORAL at 12:04

## 2018-04-10 RX ADMIN — MONTELUKAST SODIUM 10 MG: 10 TABLET, FILM COATED ORAL at 12:04

## 2018-04-10 RX ADMIN — PANTOPRAZOLE SODIUM 40 MG: 40 TABLET, DELAYED RELEASE ORAL at 12:04

## 2018-04-10 RX ADMIN — BACLOFEN 10 MG: 10 TABLET ORAL at 09:04

## 2018-04-10 NOTE — PLAN OF CARE
Problem: Patient Care Overview  Goal: Plan of Care Review  Outcome: Ongoing (interventions implemented as appropriate)  Patient is AAOx4. Vital sign stable. No falls throughout shift. Patient ambulates to bedside commode with assistance. No complaints of pain. Stool sample sent for C Diff pending.

## 2018-04-10 NOTE — PT/OT/SLP PROGRESS
Physical Therapy      Patient Name:  Selma Lux MD   MRN:  [id number]    Patient not seen today secondary to pt refusal x2 attempts despite encouragement. Will follow-up at next scheduled visit per PT POC.    Alyssa Starks PTA

## 2018-04-10 NOTE — PLAN OF CARE
Not med stable wbc up 30, but plan is O snf     04/10/18 1120   Right Care Assessment   Can the patient answer the patient profile reliably? Yes, cognitively intact   How often would a person be available to care for the patient? Often   Describe the patient's ability to walk at the present time. Walks with the help of equipment   How does the patient rate their overall health at the present time? Good   Number of comorbid conditions (as recorded on the chart) One   During the past month, has the patient often been bothered by feeling down, depressed or hopeless? No   During the past month, has the patient often been bothered by little interest or pleasure in doing things? No        04/10/18 1120   Right Care Assessment   Can the patient answer the patient profile reliably? Yes, cognitively intact   How often would a person be available to care for the patient? Often   Describe the patient's ability to walk at the present time. Walks with the help of equipment   How does the patient rate their overall health at the present time? Good   Number of comorbid conditions (as recorded on the chart) One   During the past month, has the patient often been bothered by feeling down, depressed or hopeless? No   During the past month, has the patient often been bothered by little interest or pleasure in doing things? No

## 2018-04-10 NOTE — PLAN OF CARE
Problem: Patient Care Overview  Goal: Plan of Care Review  Outcome: Ongoing (interventions implemented as appropriate)  No acute events overnight. Alert and oriented when awake, slept between care. Patient understands and agrees with plan of care. Vitals stable and within patient's baseline since admission on 2L NC. Diligent coughing, deep breathing and incentive spirometer use encouraged. No pain or nausea reported overnight. Urine output adequate overnight. No BM overnight. Up ad chiquis with assist to bedside comode. TEDs/SCDs in place while in bed. Instructed to call for help as needed, call light in reach. Bed in lowest position and brake set. Frequent rounds made for patient's safety. See flowsheets for detailed assessment. White board in patient's room updated accordingly. Continuing to monitor closely.

## 2018-04-11 ENCOUNTER — ANESTHESIA EVENT (OUTPATIENT)
Dept: INTENSIVE CARE | Facility: HOSPITAL | Age: 83
DRG: 853 | End: 2018-04-11
Payer: MEDICARE

## 2018-04-11 ENCOUNTER — ANESTHESIA (OUTPATIENT)
Dept: INTENSIVE CARE | Facility: HOSPITAL | Age: 83
DRG: 853 | End: 2018-04-11
Payer: MEDICARE

## 2018-04-11 PROBLEM — R65.21 SEPTIC SHOCK: Status: ACTIVE | Noted: 2018-04-11

## 2018-04-11 PROBLEM — J18.9 PNEUMONIA OF BOTH UPPER LOBES DUE TO INFECTIOUS ORGANISM: Status: ACTIVE | Noted: 2018-04-11

## 2018-04-11 PROBLEM — J96.01 ACUTE HYPOXEMIC RESPIRATORY FAILURE: Status: ACTIVE | Noted: 2018-04-11

## 2018-04-11 PROBLEM — G93.41 ENCEPHALOPATHY, METABOLIC: Status: ACTIVE | Noted: 2018-04-11

## 2018-04-11 PROBLEM — A41.9 SEPTIC SHOCK: Status: ACTIVE | Noted: 2018-04-11

## 2018-04-11 PROBLEM — J96.01 ACUTE RESPIRATORY FAILURE WITH HYPOXIA: Status: RESOLVED | Noted: 2018-04-05 | Resolved: 2018-04-11

## 2018-04-11 LAB
ACID FAST MOD KINY STN SPEC: NORMAL
ALBUMIN SERPL BCP-MCNC: 2 G/DL
ALLENS TEST: ABNORMAL
ALLENS TEST: ABNORMAL
ALP SERPL-CCNC: 118 U/L
ALT SERPL W/O P-5'-P-CCNC: 28 U/L
ANION GAP SERPL CALC-SCNC: 10 MMOL/L
ANION GAP SERPL CALC-SCNC: 11 MMOL/L
ANION GAP SERPL CALC-SCNC: 8 MMOL/L
ANISOCYTOSIS BLD QL SMEAR: SLIGHT
ANISOCYTOSIS BLD QL SMEAR: SLIGHT
APPEARANCE FLD: NORMAL
AST SERPL-CCNC: 12 U/L
BACTERIA UR CULT: NORMAL
BASOPHILS # BLD AUTO: 0.05 K/UL
BASOPHILS # BLD AUTO: 0.06 K/UL
BASOPHILS NFR BLD: 0.2 %
BASOPHILS NFR BLD: 0.2 %
BILIRUB SERPL-MCNC: 0.8 MG/DL
BODY FLD TYPE: NORMAL
BUN SERPL-MCNC: 11 MG/DL
BUN SERPL-MCNC: 11 MG/DL
BUN SERPL-MCNC: 9 MG/DL
BURR CELLS BLD QL SMEAR: ABNORMAL
CALCIUM SERPL-MCNC: 8.2 MG/DL
CALCIUM SERPL-MCNC: 8.3 MG/DL
CALCIUM SERPL-MCNC: 8.7 MG/DL
CHLORIDE SERPL-SCNC: 95 MMOL/L
CHLORIDE SERPL-SCNC: 96 MMOL/L
CHLORIDE SERPL-SCNC: 98 MMOL/L
CO2 SERPL-SCNC: 23 MMOL/L
CO2 SERPL-SCNC: 24 MMOL/L
CO2 SERPL-SCNC: 25 MMOL/L
COLOR FLD: COLORLESS
CREAT SERPL-MCNC: 0.8 MG/DL
CREAT SERPL-MCNC: 0.8 MG/DL
CREAT SERPL-MCNC: 1 MG/DL
DACRYOCYTES BLD QL SMEAR: ABNORMAL
DELSYS: ABNORMAL
DELSYS: ABNORMAL
DIFFERENTIAL METHOD: ABNORMAL
DIFFERENTIAL METHOD: ABNORMAL
EOSINOPHIL # BLD AUTO: 0.4 K/UL
EOSINOPHIL # BLD AUTO: 0.6 K/UL
EOSINOPHIL NFR BLD: 1.2 %
EOSINOPHIL NFR BLD: 2.1 %
EOSINOPHIL NFR FLD MANUAL: 4 %
ERYTHROCYTE [DISTWIDTH] IN BLOOD BY AUTOMATED COUNT: 15.5 %
ERYTHROCYTE [DISTWIDTH] IN BLOOD BY AUTOMATED COUNT: 16.4 %
ERYTHROCYTE [SEDIMENTATION RATE] IN BLOOD BY WESTERGREN METHOD: 23 MM/H
ERYTHROCYTE [SEDIMENTATION RATE] IN BLOOD BY WESTERGREN METHOD: 23 MM/H
EST. GFR  (AFRICAN AMERICAN): >60 ML/MIN/1.73 M^2
EST. GFR  (NON AFRICAN AMERICAN): 53.3 ML/MIN/1.73 M^2
EST. GFR  (NON AFRICAN AMERICAN): >60 ML/MIN/1.73 M^2
EST. GFR  (NON AFRICAN AMERICAN): >60 ML/MIN/1.73 M^2
FIO2: 70
GIANT PLATELETS BLD QL SMEAR: PRESENT
GLUCOSE SERPL-MCNC: 239 MG/DL
GLUCOSE SERPL-MCNC: 86 MG/DL
GLUCOSE SERPL-MCNC: 97 MG/DL
HCO3 UR-SCNC: 26.2 MMOL/L (ref 24–28)
HCO3 UR-SCNC: 27.5 MMOL/L (ref 24–28)
HCT VFR BLD AUTO: 29.6 %
HCT VFR BLD AUTO: 30.5 %
HGB BLD-MCNC: 9.4 G/DL
HGB BLD-MCNC: 9.5 G/DL
HYPOCHROMIA BLD QL SMEAR: ABNORMAL
IMM GRANULOCYTES # BLD AUTO: 0.39 K/UL
IMM GRANULOCYTES # BLD AUTO: 0.66 K/UL
IMM GRANULOCYTES NFR BLD AUTO: 1.3 %
IMM GRANULOCYTES NFR BLD AUTO: 1.9 %
INR PPP: 1.4
KOH PREP SPEC: NORMAL
LACTATE SERPL-SCNC: 1.5 MMOL/L
LYMPHOCYTES # BLD AUTO: 1.4 K/UL
LYMPHOCYTES # BLD AUTO: 1.7 K/UL
LYMPHOCYTES NFR BLD: 3.9 %
LYMPHOCYTES NFR BLD: 5.8 %
LYMPHOCYTES NFR FLD MANUAL: 35 %
MAGNESIUM SERPL-MCNC: 1.5 MG/DL
MAGNESIUM SERPL-MCNC: 2.3 MG/DL
MCH RBC QN AUTO: 23.8 PG
MCH RBC QN AUTO: 24.3 PG
MCHC RBC AUTO-ENTMCNC: 31.1 G/DL
MCHC RBC AUTO-ENTMCNC: 31.8 G/DL
MCV RBC AUTO: 76 FL
MCV RBC AUTO: 77 FL
MIN VOL: 8.92
MISCELLANEOUS TEST NAME: NORMAL
MODE: ABNORMAL
MODE: ABNORMAL
MONOCYTES # BLD AUTO: 4.4 K/UL
MONOCYTES # BLD AUTO: 5.1 K/UL
MONOCYTES NFR BLD: 14.6 %
MONOCYTES NFR BLD: 14.6 %
MONOS+MACROS NFR FLD MANUAL: 10 %
NEUTROPHILS # BLD AUTO: 22.9 K/UL
NEUTROPHILS # BLD AUTO: 27.5 K/UL
NEUTROPHILS NFR BLD: 76 %
NEUTROPHILS NFR BLD: 78.2 %
NEUTROPHILS NFR FLD MANUAL: 51 %
NRBC BLD-RTO: 0 /100 WBC
NRBC BLD-RTO: 0 /100 WBC
OVALOCYTES BLD QL SMEAR: ABNORMAL
OVALOCYTES BLD QL SMEAR: ABNORMAL
PCO2 BLDA: 49.7 MMHG (ref 35–45)
PCO2 BLDA: 54.3 MMHG (ref 35–45)
PEEP: 12
PEEP: 12
PH SMN: 7.31 [PH] (ref 7.35–7.45)
PH SMN: 7.33 [PH] (ref 7.35–7.45)
PHOSPHATE SERPL-MCNC: 3.1 MG/DL
PIP: 32
PLATELET # BLD AUTO: 352 K/UL
PLATELET # BLD AUTO: 373 K/UL
PLATELET BLD QL SMEAR: ABNORMAL
PLATELET BLD QL SMEAR: ABNORMAL
PMV BLD AUTO: 10.9 FL
PMV BLD AUTO: 9.8 FL
PO2 BLDA: 161 MMHG (ref 80–100)
PO2 BLDA: 96 MMHG (ref 80–100)
POC BE: 0 MMOL/L
POC BE: 1 MMOL/L
POC SATURATED O2: 97 % (ref 95–100)
POC SATURATED O2: 99 % (ref 95–100)
POC TCO2: 28 MMOL/L (ref 23–27)
POC TCO2: 29 MMOL/L (ref 23–27)
POIKILOCYTOSIS BLD QL SMEAR: SLIGHT
POIKILOCYTOSIS BLD QL SMEAR: SLIGHT
POLYCHROMASIA BLD QL SMEAR: ABNORMAL
POLYCHROMASIA BLD QL SMEAR: ABNORMAL
POTASSIUM SERPL-SCNC: 3.8 MMOL/L
POTASSIUM SERPL-SCNC: 3.8 MMOL/L
POTASSIUM SERPL-SCNC: 4.1 MMOL/L
PROT SERPL-MCNC: 6.4 G/DL
PROTHROMBIN TIME: 13.9 SEC
RBC # BLD AUTO: 3.87 M/UL
RBC # BLD AUTO: 3.99 M/UL
SAMPLE: ABNORMAL
SAMPLE: ABNORMAL
SITE: ABNORMAL
SITE: ABNORMAL
SODIUM SERPL-SCNC: 129 MMOL/L
SODIUM SERPL-SCNC: 130 MMOL/L
SODIUM SERPL-SCNC: 131 MMOL/L
SP02: 98
SP02: 99
T4 FREE SERPL-MCNC: 1.19 NG/DL
TARGETS BLD QL SMEAR: ABNORMAL
TSH SERPL DL<=0.005 MIU/L-ACNC: 1.09 UIU/ML
VT: 350
VT: 350
WBC # BLD AUTO: 30.08 K/UL
WBC # BLD AUTO: 35.14 K/UL
WBC # FLD: 703 /CU MM

## 2018-04-11 PROCEDURE — 20000000 HC ICU ROOM

## 2018-04-11 PROCEDURE — 87070 CULTURE OTHR SPECIMN AEROBIC: CPT

## 2018-04-11 PROCEDURE — 31622 DX BRONCHOSCOPE/WASH: CPT

## 2018-04-11 PROCEDURE — C1751 CATH, INF, PER/CENT/MIDLINE: HCPCS

## 2018-04-11 PROCEDURE — 88312 SPECIAL STAINS GROUP 1: CPT | Performed by: PATHOLOGY

## 2018-04-11 PROCEDURE — 86317 IMMUNOASSAY INFECTIOUS AGENT: CPT

## 2018-04-11 PROCEDURE — 63600175 PHARM REV CODE 636 W HCPCS

## 2018-04-11 PROCEDURE — 87205 SMEAR GRAM STAIN: CPT

## 2018-04-11 PROCEDURE — 99900035 HC TECH TIME PER 15 MIN (STAT)

## 2018-04-11 PROCEDURE — 94761 N-INVAS EAR/PLS OXIMETRY MLT: CPT

## 2018-04-11 PROCEDURE — 87385 HISTOPLASMA CAPSUL AG IA: CPT

## 2018-04-11 PROCEDURE — 88112 CYTOPATH CELL ENHANCE TECH: CPT | Mod: 26,,, | Performed by: PATHOLOGY

## 2018-04-11 PROCEDURE — 94640 AIRWAY INHALATION TREATMENT: CPT

## 2018-04-11 PROCEDURE — 87449 NOS EACH ORGANISM AG IA: CPT

## 2018-04-11 PROCEDURE — 99221 1ST HOSP IP/OBS SF/LOW 40: CPT | Mod: ,,, | Performed by: INTERNAL MEDICINE

## 2018-04-11 PROCEDURE — 87206 SMEAR FLUORESCENT/ACID STAI: CPT

## 2018-04-11 PROCEDURE — 87116 MYCOBACTERIA CULTURE: CPT

## 2018-04-11 PROCEDURE — 27200188 HC TRANSDUCER, ART ADULT/PEDS

## 2018-04-11 PROCEDURE — 87305 ASPERGILLUS AG IA: CPT | Mod: 91

## 2018-04-11 PROCEDURE — 36556 INSERT NON-TUNNEL CV CATH: CPT | Mod: ,,, | Performed by: NURSE PRACTITIONER

## 2018-04-11 PROCEDURE — 87305 ASPERGILLUS AG IA: CPT

## 2018-04-11 PROCEDURE — 27000221 HC OXYGEN, UP TO 24 HOURS

## 2018-04-11 PROCEDURE — 88312 SPECIAL STAINS GROUP 1: CPT | Mod: 26,,, | Performed by: PATHOLOGY

## 2018-04-11 PROCEDURE — 87449 NOS EACH ORGANISM AG IA: CPT | Mod: 91

## 2018-04-11 PROCEDURE — 5A1955Z RESPIRATORY VENTILATION, GREATER THAN 96 CONSECUTIVE HOURS: ICD-10-PCS | Performed by: ANESTHESIOLOGY

## 2018-04-11 PROCEDURE — 99223 1ST HOSP IP/OBS HIGH 75: CPT | Mod: GC,,, | Performed by: INTERNAL MEDICINE

## 2018-04-11 PROCEDURE — 87102 FUNGUS ISOLATION CULTURE: CPT

## 2018-04-11 PROCEDURE — 85610 PROTHROMBIN TIME: CPT

## 2018-04-11 PROCEDURE — 25000003 PHARM REV CODE 250

## 2018-04-11 PROCEDURE — 84100 ASSAY OF PHOSPHORUS: CPT

## 2018-04-11 PROCEDURE — 36415 COLL VENOUS BLD VENIPUNCTURE: CPT

## 2018-04-11 PROCEDURE — 99291 CRITICAL CARE FIRST HOUR: CPT | Mod: 25,,, | Performed by: NURSE PRACTITIONER

## 2018-04-11 PROCEDURE — 84439 ASSAY OF FREE THYROXINE: CPT

## 2018-04-11 PROCEDURE — 36620 INSERTION CATHETER ARTERY: CPT

## 2018-04-11 PROCEDURE — 85025 COMPLETE CBC W/AUTO DIFF WBC: CPT

## 2018-04-11 PROCEDURE — 83735 ASSAY OF MAGNESIUM: CPT

## 2018-04-11 PROCEDURE — 88305 TISSUE EXAM BY PATHOLOGIST: CPT | Mod: 26,,, | Performed by: PATHOLOGY

## 2018-04-11 PROCEDURE — 31624 DX BRONCHOSCOPE/LAVAGE: CPT | Mod: RT,GC,, | Performed by: INTERNAL MEDICINE

## 2018-04-11 PROCEDURE — 0BH17EZ INSERTION OF ENDOTRACHEAL AIRWAY INTO TRACHEA, VIA NATURAL OR ARTIFICIAL OPENING: ICD-10-PCS | Performed by: ANESTHESIOLOGY

## 2018-04-11 PROCEDURE — 0B9F8ZX DRAINAGE OF RIGHT LOWER LUNG LOBE, VIA NATURAL OR ARTIFICIAL OPENING ENDOSCOPIC, DIAGNOSTIC: ICD-10-PCS | Performed by: INTERNAL MEDICINE

## 2018-04-11 PROCEDURE — 36620 INSERTION CATHETER ARTERY: CPT | Mod: 59,,, | Performed by: NURSE PRACTITIONER

## 2018-04-11 PROCEDURE — 63600175 PHARM REV CODE 636 W HCPCS: Performed by: INTERNAL MEDICINE

## 2018-04-11 PROCEDURE — 63600175 PHARM REV CODE 636 W HCPCS: Performed by: HOSPITALIST

## 2018-04-11 PROCEDURE — 83605 ASSAY OF LACTIC ACID: CPT

## 2018-04-11 PROCEDURE — 25000003 PHARM REV CODE 250: Performed by: INTERNAL MEDICINE

## 2018-04-11 PROCEDURE — 84443 ASSAY THYROID STIM HORMONE: CPT

## 2018-04-11 PROCEDURE — 87106 FUNGI IDENTIFICATION YEAST: CPT

## 2018-04-11 PROCEDURE — 25000242 PHARM REV CODE 250 ALT 637 W/ HCPCS: Performed by: INTERNAL MEDICINE

## 2018-04-11 PROCEDURE — 25000003 PHARM REV CODE 250: Performed by: NURSE PRACTITIONER

## 2018-04-11 PROCEDURE — 86403 PARTICLE AGGLUT ANTBDY SCRN: CPT

## 2018-04-11 PROCEDURE — 94660 CPAP INITIATION&MGMT: CPT

## 2018-04-11 PROCEDURE — S0039 INJECTION, SULFAMETHOXAZOLE: HCPCS | Performed by: NURSE PRACTITIONER

## 2018-04-11 PROCEDURE — 82803 BLOOD GASES ANY COMBINATION: CPT

## 2018-04-11 PROCEDURE — 80048 BASIC METABOLIC PNL TOTAL CA: CPT

## 2018-04-11 PROCEDURE — 89051 BODY FLUID CELL COUNT: CPT

## 2018-04-11 PROCEDURE — 99900025 HC BRONCHOSCOPY-ASST (STAT)

## 2018-04-11 PROCEDURE — 25000003 PHARM REV CODE 250: Performed by: PHYSICIAN ASSISTANT

## 2018-04-11 PROCEDURE — 31500 INSERT EMERGENCY AIRWAY: CPT | Mod: GC,,, | Performed by: ANESTHESIOLOGY

## 2018-04-11 PROCEDURE — 02HV33Z INSERTION OF INFUSION DEVICE INTO SUPERIOR VENA CAVA, PERCUTANEOUS APPROACH: ICD-10-PCS | Performed by: INTERNAL MEDICINE

## 2018-04-11 PROCEDURE — 87015 SPECIMEN INFECT AGNT CONCNTJ: CPT

## 2018-04-11 PROCEDURE — 51702 INSERT TEMP BLADDER CATH: CPT

## 2018-04-11 PROCEDURE — 31500 INSERT EMERGENCY AIRWAY: CPT

## 2018-04-11 PROCEDURE — 43752 NASAL/OROGASTRIC W/TUBE PLMT: CPT

## 2018-04-11 PROCEDURE — 83735 ASSAY OF MAGNESIUM: CPT | Mod: 91

## 2018-04-11 PROCEDURE — 87040 BLOOD CULTURE FOR BACTERIA: CPT

## 2018-04-11 PROCEDURE — 27000190 HC CPAP FULL FACE MASK W/VALVE

## 2018-04-11 PROCEDURE — 25000003 PHARM REV CODE 250: Performed by: HOSPITALIST

## 2018-04-11 PROCEDURE — 36556 INSERT NON-TUNNEL CV CATH: CPT

## 2018-04-11 PROCEDURE — 37799 UNLISTED PX VASCULAR SURGERY: CPT

## 2018-04-11 PROCEDURE — 99900026 HC AIRWAY MAINTENANCE (STAT)

## 2018-04-11 PROCEDURE — 87210 SMEAR WET MOUNT SALINE/INK: CPT

## 2018-04-11 PROCEDURE — 94002 VENT MGMT INPAT INIT DAY: CPT

## 2018-04-11 PROCEDURE — 87206 SMEAR FLUORESCENT/ACID STAI: CPT | Mod: 91

## 2018-04-11 PROCEDURE — 80053 COMPREHEN METABOLIC PANEL: CPT

## 2018-04-11 PROCEDURE — 63600175 PHARM REV CODE 636 W HCPCS: Performed by: NURSE PRACTITIONER

## 2018-04-11 PROCEDURE — 25500020 PHARM REV CODE 255: Performed by: HOSPITALIST

## 2018-04-11 PROCEDURE — 88305 TISSUE EXAM BY PATHOLOGIST: CPT | Performed by: PATHOLOGY

## 2018-04-11 PROCEDURE — 94760 N-INVAS EAR/PLS OXIMETRY 1: CPT

## 2018-04-11 PROCEDURE — 25000242 PHARM REV CODE 250 ALT 637 W/ HCPCS: Performed by: HOSPITALIST

## 2018-04-11 RX ORDER — NOREPINEPHRINE BITARTRATE/D5W 4MG/250ML
0.05 PLASTIC BAG, INJECTION (ML) INTRAVENOUS CONTINUOUS
Status: DISCONTINUED | OUTPATIENT
Start: 2018-04-11 | End: 2018-04-16

## 2018-04-11 RX ORDER — PANTOPRAZOLE SODIUM 40 MG/1
40 FOR SUSPENSION ORAL DAILY
Status: DISCONTINUED | OUTPATIENT
Start: 2018-04-12 | End: 2018-04-22

## 2018-04-11 RX ORDER — METHYLPREDNISOLONE SOD SUCC 125 MG
125 VIAL (EA) INJECTION ONCE
Status: COMPLETED | OUTPATIENT
Start: 2018-04-11 | End: 2018-04-11

## 2018-04-11 RX ORDER — NOREPINEPHRINE BITARTRATE/D5W 4MG/250ML
PLASTIC BAG, INJECTION (ML) INTRAVENOUS
Status: COMPLETED
Start: 2018-04-11 | End: 2018-04-11

## 2018-04-11 RX ORDER — CEFEPIME HYDROCHLORIDE 2 G/1
2 INJECTION, POWDER, FOR SOLUTION INTRAVENOUS
Status: DISCONTINUED | OUTPATIENT
Start: 2018-04-11 | End: 2018-04-11

## 2018-04-11 RX ORDER — ENOXAPARIN SODIUM 100 MG/ML
40 INJECTION SUBCUTANEOUS EVERY 24 HOURS
Status: DISCONTINUED | OUTPATIENT
Start: 2018-04-11 | End: 2018-04-15

## 2018-04-11 RX ORDER — PHENYLEPHRINE HYDROCHLORIDE 10 MG/ML
100 INJECTION INTRAVENOUS ONCE AS NEEDED
Status: DISCONTINUED | OUTPATIENT
Start: 2018-04-11 | End: 2018-04-11

## 2018-04-11 RX ORDER — MIDAZOLAM HYDROCHLORIDE 1 MG/ML
INJECTION INTRAMUSCULAR; INTRAVENOUS
Status: COMPLETED
Start: 2018-04-11 | End: 2018-04-11

## 2018-04-11 RX ORDER — MAGNESIUM SULFATE HEPTAHYDRATE 40 MG/ML
2 INJECTION, SOLUTION INTRAVENOUS ONCE
Status: COMPLETED | OUTPATIENT
Start: 2018-04-11 | End: 2018-04-11

## 2018-04-11 RX ORDER — ETOMIDATE 2 MG/ML
0.3 INJECTION INTRAVENOUS ONCE
Status: DISCONTINUED | OUTPATIENT
Start: 2018-04-11 | End: 2018-04-12

## 2018-04-11 RX ORDER — FENTANYL CITRATE/PF 250MCG/5ML
50 SYRINGE (ML) INTRAVENOUS
Status: DISCONTINUED | OUTPATIENT
Start: 2018-04-11 | End: 2018-04-11

## 2018-04-11 RX ORDER — ETOMIDATE 2 MG/ML
0.3 INJECTION INTRAVENOUS ONCE
Status: DISCONTINUED | OUTPATIENT
Start: 2018-04-11 | End: 2018-04-11

## 2018-04-11 RX ORDER — SUCCINYLCHOLINE CHLORIDE 20 MG/ML
1 INJECTION INTRAMUSCULAR; INTRAVENOUS ONCE
Status: DISCONTINUED | OUTPATIENT
Start: 2018-04-11 | End: 2018-04-11

## 2018-04-11 RX ORDER — LORAZEPAM 2 MG/ML
INJECTION INTRAMUSCULAR
Status: COMPLETED
Start: 2018-04-11 | End: 2018-04-11

## 2018-04-11 RX ORDER — LIDOCAINE HYDROCHLORIDE 20 MG/ML
INJECTION, SOLUTION INFILTRATION; PERINEURAL
Status: COMPLETED
Start: 2018-04-11 | End: 2018-04-11

## 2018-04-11 RX ORDER — CHLORHEXIDINE GLUCONATE ORAL RINSE 1.2 MG/ML
15 SOLUTION DENTAL 2 TIMES DAILY
Status: DISCONTINUED | OUTPATIENT
Start: 2018-04-11 | End: 2018-04-22

## 2018-04-11 RX ORDER — PROPOFOL 10 MG/ML
5 INJECTION, EMULSION INTRAVENOUS CONTINUOUS
Status: DISCONTINUED | OUTPATIENT
Start: 2018-04-11 | End: 2018-04-13

## 2018-04-11 RX ORDER — PROPOFOL 10 MG/ML
INJECTION, EMULSION INTRAVENOUS
Status: COMPLETED
Start: 2018-04-11 | End: 2018-04-11

## 2018-04-11 RX ORDER — VANCOMYCIN/0.9 % SOD CHLORIDE 1 G/100 ML
1000 PLASTIC BAG, INJECTION (ML) INTRAVENOUS
Status: DISCONTINUED | OUTPATIENT
Start: 2018-04-12 | End: 2018-04-13

## 2018-04-11 RX ORDER — IPRATROPIUM BROMIDE AND ALBUTEROL SULFATE 2.5; .5 MG/3ML; MG/3ML
3 SOLUTION RESPIRATORY (INHALATION) EVERY 4 HOURS
Status: DISCONTINUED | OUTPATIENT
Start: 2018-04-11 | End: 2018-04-11

## 2018-04-11 RX ORDER — FENTANYL CITRATE 50 UG/ML
50 INJECTION, SOLUTION INTRAMUSCULAR; INTRAVENOUS
Status: DISCONTINUED | OUTPATIENT
Start: 2018-04-11 | End: 2018-04-11

## 2018-04-11 RX ORDER — FENTANYL CITRATE 50 UG/ML
INJECTION, SOLUTION INTRAMUSCULAR; INTRAVENOUS
Status: COMPLETED
Start: 2018-04-11 | End: 2018-04-11

## 2018-04-11 RX ORDER — LIDOCAINE HYDROCHLORIDE 10 MG/ML
5 INJECTION INFILTRATION; PERINEURAL ONCE
Status: COMPLETED | OUTPATIENT
Start: 2018-04-11 | End: 2018-04-11

## 2018-04-11 RX ORDER — FENTANYL CITRATE-0.9 % NACL/PF 10 MCG/ML
25 PLASTIC BAG, INJECTION (ML) INTRAVENOUS CONTINUOUS
Status: DISCONTINUED | OUTPATIENT
Start: 2018-04-11 | End: 2018-04-18

## 2018-04-11 RX ORDER — LORAZEPAM 2 MG/ML
2 INJECTION INTRAMUSCULAR ONCE
Status: COMPLETED | OUTPATIENT
Start: 2018-04-11 | End: 2018-04-11

## 2018-04-11 RX ORDER — SODIUM CHLORIDE 9 MG/ML
INJECTION, SOLUTION INTRAVENOUS CONTINUOUS
Status: DISCONTINUED | OUTPATIENT
Start: 2018-04-11 | End: 2018-04-11

## 2018-04-11 RX ORDER — IPRATROPIUM BROMIDE AND ALBUTEROL SULFATE 2.5; .5 MG/3ML; MG/3ML
3 SOLUTION RESPIRATORY (INHALATION) EVERY 4 HOURS
Status: DISCONTINUED | OUTPATIENT
Start: 2018-04-11 | End: 2018-04-23

## 2018-04-11 RX ORDER — METRONIDAZOLE 500 MG/100ML
500 INJECTION, SOLUTION INTRAVENOUS EVERY 8 HOURS
Status: DISCONTINUED | OUTPATIENT
Start: 2018-04-11 | End: 2018-04-11

## 2018-04-11 RX ORDER — METHYLPREDNISOLONE SOD SUCC 125 MG
80 VIAL (EA) INJECTION EVERY 12 HOURS
Status: DISCONTINUED | OUTPATIENT
Start: 2018-04-11 | End: 2018-04-23

## 2018-04-11 RX ORDER — PHENYLEPHRINE HCL IN 0.9% NACL 1 MG/10 ML
SYRINGE (ML) INTRAVENOUS
Status: COMPLETED
Start: 2018-04-11 | End: 2018-04-11

## 2018-04-11 RX ORDER — FUROSEMIDE 10 MG/ML
40 INJECTION INTRAMUSCULAR; INTRAVENOUS ONCE
Status: COMPLETED | OUTPATIENT
Start: 2018-04-11 | End: 2018-04-11

## 2018-04-11 RX ORDER — MIDAZOLAM HYDROCHLORIDE 5 MG/ML
6 INJECTION INTRAMUSCULAR; INTRAVENOUS ONCE
Status: DISCONTINUED | OUTPATIENT
Start: 2018-04-11 | End: 2018-04-11

## 2018-04-11 RX ORDER — SUCCINYLCHOLINE CHLORIDE 20 MG/ML
1 INJECTION INTRAMUSCULAR; INTRAVENOUS ONCE
Status: COMPLETED | OUTPATIENT
Start: 2018-04-11 | End: 2018-04-11

## 2018-04-11 RX ORDER — FENTANYL CITRATE 50 UG/ML
50 INJECTION, SOLUTION INTRAMUSCULAR; INTRAVENOUS
Status: DISCONTINUED | OUTPATIENT
Start: 2018-04-11 | End: 2018-04-19

## 2018-04-11 RX ADMIN — SUCCINYLCHOLINE CHLORIDE 68 MG: 20 INJECTION, SOLUTION INTRAMUSCULAR; INTRAVENOUS at 01:04

## 2018-04-11 RX ADMIN — GUAIFENESIN 200 MG: 200 SOLUTION ORAL at 01:04

## 2018-04-11 RX ADMIN — CLOPIDOGREL 75 MG: 75 TABLET, FILM COATED ORAL at 08:04

## 2018-04-11 RX ADMIN — CHLORHEXIDINE GLUCONATE 15 ML: 1.2 RINSE ORAL at 08:04

## 2018-04-11 RX ADMIN — LIDOCAINE HYDROCHLORIDE 10 ML: 20 INJECTION, SOLUTION INFILTRATION; PERINEURAL at 04:04

## 2018-04-11 RX ADMIN — Medication 75 MCG/HR: at 02:04

## 2018-04-11 RX ADMIN — FUROSEMIDE 40 MG: 10 INJECTION, SOLUTION INTRAMUSCULAR; INTRAVENOUS at 02:04

## 2018-04-11 RX ADMIN — IPRATROPIUM BROMIDE AND ALBUTEROL SULFATE 3 ML: .5; 3 SOLUTION RESPIRATORY (INHALATION) at 08:04

## 2018-04-11 RX ADMIN — IPRATROPIUM BROMIDE AND ALBUTEROL SULFATE 3 ML: .5; 3 SOLUTION RESPIRATORY (INHALATION) at 12:04

## 2018-04-11 RX ADMIN — IOHEXOL 75 ML: 350 INJECTION, SOLUTION INTRAVENOUS at 10:04

## 2018-04-11 RX ADMIN — LIDOCAINE HYDROCHLORIDE 5 ML: 10 INJECTION, SOLUTION INFILTRATION; PERINEURAL at 03:04

## 2018-04-11 RX ADMIN — DEXTROSE 300 MG: 5 SOLUTION INTRAVENOUS at 08:04

## 2018-04-11 RX ADMIN — PROPOFOL 50 MCG/KG/MIN: 10 INJECTION, EMULSION INTRAVENOUS at 02:04

## 2018-04-11 RX ADMIN — PIPERACILLIN AND TAZOBACTAM 4.5 G: 4; .5 INJECTION, POWDER, LYOPHILIZED, FOR SOLUTION INTRAVENOUS; PARENTERAL at 10:04

## 2018-04-11 RX ADMIN — Medication 50 MCG/HR: at 02:04

## 2018-04-11 RX ADMIN — AMPICILLIN SODIUM AND SULBACTAM SODIUM 3 G: 2; 1 INJECTION, POWDER, FOR SOLUTION INTRAMUSCULAR; INTRAVENOUS at 12:04

## 2018-04-11 RX ADMIN — CISATRACURIUM BESYLATE 1 MCG/KG/MIN: 10 INJECTION INTRAVENOUS at 04:04

## 2018-04-11 RX ADMIN — IPRATROPIUM BROMIDE AND ALBUTEROL SULFATE 3 ML: .5; 3 SOLUTION RESPIRATORY (INHALATION) at 07:04

## 2018-04-11 RX ADMIN — PIPERACILLIN AND TAZOBACTAM 4.5 G: 4; .5 INJECTION, POWDER, LYOPHILIZED, FOR SOLUTION INTRAVENOUS; PARENTERAL at 02:04

## 2018-04-11 RX ADMIN — Medication 0.2 MCG/KG/MIN: at 08:04

## 2018-04-11 RX ADMIN — METHYLPREDNISOLONE SODIUM SUCCINATE 125 MG: 125 INJECTION, POWDER, FOR SOLUTION INTRAMUSCULAR; INTRAVENOUS at 02:04

## 2018-04-11 RX ADMIN — Medication 0.3 MCG/KG/MIN: at 02:04

## 2018-04-11 RX ADMIN — PROPOFOL 40 MCG/KG/MIN: 10 INJECTION, EMULSION INTRAVENOUS at 11:04

## 2018-04-11 RX ADMIN — ENOXAPARIN SODIUM 40 MG: 100 INJECTION SUBCUTANEOUS at 05:04

## 2018-04-11 RX ADMIN — VANCOMYCIN HYDROCHLORIDE 1500 MG: 10 INJECTION, POWDER, LYOPHILIZED, FOR SOLUTION INTRAVENOUS at 03:04

## 2018-04-11 RX ADMIN — LORAZEPAM 2 MG: 2 INJECTION INTRAMUSCULAR; INTRAVENOUS at 02:04

## 2018-04-11 RX ADMIN — MINERAL OIL AND WHITE PETROLATUM: 150; 830 OINTMENT OPHTHALMIC at 10:04

## 2018-04-11 RX ADMIN — FENTANYL CITRATE 50 MCG: 50 INJECTION, SOLUTION INTRAMUSCULAR; INTRAVENOUS at 01:04

## 2018-04-11 RX ADMIN — METHYLPREDNISOLONE SODIUM SUCCINATE 80 MG: 125 INJECTION, POWDER, FOR SOLUTION INTRAMUSCULAR; INTRAVENOUS at 08:04

## 2018-04-11 RX ADMIN — Medication 0.3 MCG/KG/MIN: at 05:04

## 2018-04-11 RX ADMIN — IPRATROPIUM BROMIDE AND ALBUTEROL SULFATE 3 ML: .5; 3 SOLUTION RESPIRATORY (INHALATION) at 11:04

## 2018-04-11 RX ADMIN — MAGNESIUM SULFATE IN WATER 2 G: 40 INJECTION, SOLUTION INTRAVENOUS at 07:04

## 2018-04-11 RX ADMIN — SULFAMETHOXAZOLE AND TRIMETHOPRIM 340 MG: 80; 16 INJECTION, SOLUTION, CONCENTRATE INTRAVENOUS at 05:04

## 2018-04-11 RX ADMIN — MIDAZOLAM HYDROCHLORIDE 2 MG: 1 INJECTION, SOLUTION INTRAMUSCULAR; INTRAVENOUS at 02:04

## 2018-04-11 NOTE — PROCEDURES
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    Temp: 98.4 °F (36.9 °C) (18 1500)  Pulse: 94 (18 1600)  Resp: (!) 27 (18 1600)  BP: (!) 149/66 (18 1600)  SpO2: 97 % (18 1600)  Weight: 73.6 kg (162 lb 4.1 oz) (18 0400)  Height: 5' 5" (165.1 cm) (18 0400)       Arterial Line  Date/Time: 2018 4:15 PM  Location procedure was performed: [facility] ([facility])  Performed by: PHILIPP FARAH  Authorized by: PHILIPP FARAH   Assisting provider: JOVITA CROSS  Pre-op Diagnosis: shock  Post-operative diagnosis: shock  Consent Done: Yes  Consent: Written consent obtained.  Risks and benefits: risks, benefits and alternatives were discussed  Patient understanding: patient states understanding of the procedure being performed  Patient consent: the patient's understanding of the procedure matches consent given  Procedure consent: procedure consent matches procedure scheduled  Relevant documents: relevant documents present and verified  Patient identity confirmed: , MRN and name  Time out: Immediately prior to procedure a "time out" was called to verify the correct patient, procedure, equipment, support staff and site/side marked as required.  Preparation: Patient was prepped and draped in the usual sterile fashion.  Indications: respiratory failure and hemodynamic monitoring  Location: right radial    Anesthesia:  Local Anesthetic: lidocaine 1% without epinephrine  Anesthetic total: 1 mL  Patient sedated: yes  Needle gauge: 20  Seldinger technique: Seldinger technique used  Number of attempts: 1  Technical procedures used: ultrasound guided  Complications: No  Estimated blood loss (mL): 2  Post-procedure: line sutured and dressing applied  Post-procedure CMS: normal  Patient tolerance: Patient tolerated the procedure well with no immediate complications          Philipp Farah  2018  "

## 2018-04-11 NOTE — HPI
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 She was seen by her PCP on 4/2 for these complaints and received a prescription for duo nebs and tessalon perles. CXR performed on 4/2 noted bilateral basilar infiltrates. At that time, she denied fever, chills, sputum production, sick contacts. She was admitted to  and treated emprically for CAP with rocephin and azithromycin. She was also diuresed daily. However, her oxygen requirements slowly increased from 2 L NC to 4 L NC with also progressively worsening bilateral infiltrates on imaging. CTA performed on 4/9 negative for PE, however noted significant bilateral consolidations. On 4/11, her hypoxemia continued to worsen, requiring NRB mask, and she developed severe respiratory distress. She was transferred to the MICU and emergently intubated. Repeat CT chest performed on 4/11 demonstrated progressive worsening of bilateral peripheral infiltrates.

## 2018-04-11 NOTE — ASSESSMENT & PLAN NOTE
- 3/4 SIRS criteria:  HR >90, RR >20, WBC >12k in ED, Satting 92% on 2L NC  - Suspect lung as the cause (seen on CXR)  - Duonebs q4h while awake and prn  - Guaifenesin and Codeine cough syrup prn cough and Tesslon Perles  - Refused Pneumonia vaccine previously  - will expand antibiotics to anaerobic coverage due to rising WBC and lack of clinical improvement.

## 2018-04-11 NOTE — ASSESSMENT & PLAN NOTE
83yo woman w/a history of prior CVA, RA (dx 2012; RF seropositive, erosive; on HCQ/prednisone 5-10mg and recently started humira first dose 3/22/2018), IBS, and Hasimotos thyroiditis (goiter) who was admitted on 4/5/2018 with 2 days of dry cough and progressive SOB due to multifocal PNA (with peripheral and basilar lesions noted on CT chest) that has progressively worsened despite CAP coverage over the last 5 days, requiring intubation today for hypoxic RF. She is critically ill and will expand coverage for possibilities on differential (most notable for MDRO bacterial HCAP/HAP, IFI including Aspergillus and endemic fungi with PCP less likely given steroid dose, and  vs hypersensitivity pneumonitis).     - will broaden coverage for HCAP to vanc/zosyn -- she has completed 5 days of atypical coverage with azithromycin already  - will initiate posaconazole for coverage of the above mold species and will send fungal markers  - agree with empiric PCP treatment with bactrim/steroids although not classic for PCP  - await pending bronchoscopy for cultures, cytology with GMS, and surg-path if possible  - will tailor therapy with you as she hopefully improves

## 2018-04-11 NOTE — EICU
To bedside for emergent intubation.  Present are MDs, RNs, and RT.  PT currently on BIPAP with 100% FiO2.  1327 - versed 2 mg given IVP  1329 - propofol 170 mg given IVP  1331 - 140 mg succinylcholine  Pt bagged with 100% FiO2  1334 - intubated per MD Patel using glidescope with #7.5 ETT secured at 22 cm to lips.  Color change noted to ETCO2 detector.  Bedside confirms bilat air exchange.  PCXR ordered.  Pt tolerated procedure well.  1337 - OGT placed per RN to (L) mouth.  Secured at 55.  KUB ordered for placement.  1339 - SaO2 95%

## 2018-04-11 NOTE — PROGRESS NOTES
[facility] to bedside monitor - cardiac monitoring and continuous pulse oximetry applied. Call bell within reach, side rails raised x 2, bed locked and in lowest position. Primary service at bedside and aware of patient arrival. Patient on 15L nonrebreather. o2 sat 88%. BIPAP trial going to be perform. Will continue to monitor.

## 2018-04-11 NOTE — PROGRESS NOTES
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  She mentions that for 2 weeks, she has been having a dry cough.  She endorses worsening SOB and fatigue.  She went to see her PCP who started her on an inhaler.  She was given Tessalon Perles which have helped her cough.  She doesn't wear oxygen at home.  She denies any sick contacts, chest pain, palpitations.  She denies any fevers or chills.  She claims to be compliant with her medications.  She didn't receive the pneumonia vaccine.    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 IV lasix started with good UOP and improvement of LE swelling. Continue to have dyspnea, tachypnea and sinus tachycardia. 2D ECHO, showed EF 60-65%, L atrial enlargement, diastolic dysfunction noted.  Elevated D-Dimer, CTA negative for PE.     Interval History: does not feel well today. Continues to cough    Review of Systems  Constitutional: Negative for chills, fatigue, fever.   HENT: Negative for sore throat, trouble swallowing.    Eyes: Negative for photophobia, visual disturbance.   Respiratory: + cough, shortness of breath.    Cardiovascular: Negative for chest pain, palpitations, leg swelling.   Gastrointestinal: Negative for abdominal pain, constipation, diarrhea, nausea, vomiting.   Endocrine: Negative for cold intolerance, heat intolerance.   Genitourinary: Negative for dysuria, frequency.    Musculoskeletal: Negative for arthralgias, myalgias.   Skin: Negative for rash, wound   Neurological: Negative for dizziness, syncope, weakness, light-headedness.   Psychiatric/Behavioral: Negative for confusion, hallucinations, anxiety  All other systems reviewed and are negative.      Objective:     Vital Signs (Most Recent):  Temp: 99.4 °F (37.4 °C) (04/10/18 1618)  Pulse: 108 (04/10/18 1800)  Resp: 20 (04/10/18 1618)  BP: 139/70 (04/10/18 1618)  SpO2: (!) 86 % (04/10/18 1618) Vital Signs (24h Range):  Temp:  [97.9 °F (36.6 °C)-99.4 °F (37.4 °C)] 99.4 °F (37.4 °C)  Pulse:  [] 108  Resp:  [16-22] 20  SpO2:  [86 %-99 %] 86 %  BP: (120-149)/(57-70) 139/70     Weight: 73.6 kg (162 lb 4.1 oz)  Body mass index is 27 kg/m².    Intake/Output Summary (Last 24 hours) at 04/10/18 1904  Last data filed at 04/10/18 1100   Gross per 24 hour   Intake              860 ml   Output              950 ml   Net              -90 ml        Physical Exam  Constitutional: Appears well-developed and well-nourished.   Head: Normocephalic and atraumatic.   Mouth/Throat: Oropharynx is clear and moist.   Eyes: EOM are normal. Pupils are equal, round, and reactive to light. No scleral icterus.   Neck: Normal range of motion. Neck supple.   Cardiovascular: Normal rate and regular rhythm.  No murmur heard.  Pulmonary/Chest: On nasal cannula.  Rales in the LLL  Abdominal: Soft. Bowel sounds are normal.  No distension or tenderness  Musculoskeletal: Normal range of motion. Mild  edema BLE  Neurological: Alert and oriented to person, place, and time.   Skin: Skin is warm and dry.   Psychiatric: Normal mood and affect. Behavior is normal.      MELD-Na score: 8 at 4/7/2018  4:08 AM  MELD score: 8 at 4/7/2018  4:08 AM  Calculated from:  Serum Creatinine: 0.8 mg/dL (Rounded to 1) at 4/7/2018  4:08 AM  Serum Sodium: 137 mmol/L at 4/7/2018  4:08 AM  Total Bilirubin: 0.5 mg/dL (Rounded to 1) at 4/7/2018  4:08 AM  INR(ratio): 1.2 at 4/5/2018  5:15  PM  Age: 82 years    Significant Labs:  CBC:    Recent Labs  Lab 04/09/18  0455 04/10/18  0352   WBC 26.03* 30.05*   HGB 10.0* 9.6*   HCT 32.5* 31.7*   * 381*     CMP:    Recent Labs  Lab 04/09/18  0455 04/10/18  0352    132*   K 3.7 4.2    97   CO2 26 25   GLU 81 94   BUN 16 15   CREATININE 0.9 0.9   CALCIUM 8.5* 8.5*   PROT 6.4 6.2   ALBUMIN 2.0* 2.0*   BILITOT 0.6 0.6   ALKPHOS 135 117   AST 38 14   ALT 72* 43   ANIONGAP 11 10   EGFRNONAA >60.0 >60.0     PTINR:  No results for input(s): INR in the last 48 hours.    Significant Procedures:   Dobutamine Stress Test with Color Flow: No results found for this or any previous visit.         Assessment/Plan:      * Acute respiratory failure with hypoxia    - PNA combined with either CHF exacerbation vs PE  - PNA (as below)  - CHF exacerbation: pt with elevated BNP, LE swelling, last ECHO showed normal EF but diastolic dysfunction, 1x dose of IV lasix with good UOP, will continue  - 2D ECHO pending showed EF 60-65%, L atrial enlargement, diastolic dysfunction noted.  - On 2-4L NC at this time, Continue to wean   - Pt continue to have tachypnea and continue SOB, + d-dimer, CTA chest - PE  - Continue treatment of PNA (5 days total) and IV lasix for now            Thyroid enlargement    - CT showed: Enlarged right thyroid lobe with substernal extension and large hypodense thyroid nodule. There is subsequent mass effect on the adjacent trachea.   - Chronic  - similar to prior CT neck of 05/31/2016          Pneumonia due to Streptococcus pneumoniae    - 3/4 SIRS criteria:  HR >90, RR >20, WBC >12k in ED, Satting 92% on 2L NC  - Suspect lung as the cause (seen on CXR)  - Duonebs q4h while awake and prn  - Guaifenesin and Codeine cough syrup prn cough and Tesslon Perles  - Refused Pneumonia vaccine previously  - will expand antibiotics to anaerobic coverage due to rising WBC and lack of clinical improvement.              Cervical dystonia    - Chronic and  stable  - Continue Gabapentin 300mg PO TID  - Continue Baclofen 10mg PO TID          Cerebrovascular accident (CVA)    - Loves at home by herself  - PT/OT consulted: SNF          Vascular dementia    - Chronic and stable  - Continue Plavix 75mg PO daily             Hypertension, essential    - Chronic and stable  - Continue Norvasc 5mg PO daily             Seropositive rheumatoid arthritis of multiple sites    - Chronic and stable  - Continue Plaquenil 400mg PO daily  - Prednisone 5mg PO daily           Fatty liver    - Chronic  - LFTs more elevated then normal  - Continue to monitor            VTE Risk Mitigation         Ordered     IP VTE HIGH RISK PATIENT  Once      04/05/18 2313     Place DIMPLE hose  Until discontinued      04/05/18 2210              CONNIE FRANKLIN MD  [facility] Medicine  Pager: [phone]  Spectralink: [id number]   Cell: [phone]

## 2018-04-11 NOTE — HPI
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   Pt has a long standing history of RA beginning in 2012, she has been treated with prednisone and HCQ primarily. Previous DMARDs  (MTX) were attempted however pt refused. She was previously following with Dr. Qureshi but has most recently seen Dr. Prakash Her w/u revealed an MRI of the left hand which showed prominent diffuse tenosynovitis of the flexor and extensor tendons with tiny erosions and advanced osteoarthritic changes involving the thumb,DIP joints with erosive arthritis in the PIP joints   In the past she had RF between 20 to 40 and negative CCP. She has a h/o elevated inflammatory markers as well.   Pt was initiated on Humira 40 mg SC on 3/22/18 during an office visit. She has not received further Humira. Pt was on prednisone 10 mg and  mg/d    Pt is currently undergoing treatment for Strep pna,however after undergoing a CT scan, she acutely decompensated. Pt became tachyneic and desaturated. Pt now transferred to the ICU. She is now on solumedrol 125 mg*1, antibiotics have been broaden for infectious coverage.

## 2018-04-11 NOTE — CONSULTS
[facility]  Rheumatology  Consult Note    Patient Name: Selma Alonzo Lux MD  MRN: [id number]  Admission Date: 4/5/2018  Hospital Length of Stay: 6 days  Code Status: Full Code   Attending Provider: Patricia Kirkpatrick MD  Primary Care Physician: Bhargav Hirsch MD  Principal Problem:Acute hypoxemic respiratory failure    Inpatient consult to Rheumatology  Consult performed by: ALINA BROWN  Consult ordered by: CONNIE FRANKLIN  Reason for consult: RA flare concern.         Subjective:     HPI:  Jose J is an 81 yo with,  IBS,  anemia,PUD,fatty liver,  hashimotos thyroiditis/goiter,low vit d, h/o CVA, OA,  seropositive (RF+)  erosive RA currently admitted for pna complicated by respitatory failure  Rheumatology consulted to evaluate for RA flare.   Pt has a long standing history of RA beginning in 2012, she has been treated with prednisone and HCQ primarily. Previous DMARDs  (MTX) were attempted however pt refused. She was previously following with Dr. Qureshi but has most recently seen Dr. Prakash Her w/u revealed an MRI of the left hand which showed prominent diffuse tenosynovitis of the flexor and extensor tendons with tiny erosions and advanced osteoarthritic changes involving the thumb,DIP joints with erosive arthritis in the PIP joints   In the past she had RF between 20 to 40 and negative CCP. She has a h/o elevated inflammatory markers as well.   Pt was initiated on Humira 40 mg SC on 3/22/18 during an office visit. She has not received further Humira. Pt was on prednisone 10 mg and  mg/d    Pt is currently undergoing treatment for Strep pna,however after undergoing a CT scan, she acutely decompensated. Pt became tachyneic and desaturated. Pt now transferred to the ICU. She is now on solumedrol 125 mg*1, antibiotics have been broaden for infectious coverage.         Past Medical History:   Diagnosis Date    Anemia     Arthritis     Hashimoto's disease      Hypertension     IGT (impaired glucose tolerance) 1/12/2016    Joint pain     Multinodular goiter 1/12/2016    Stroke        Past Surgical History:   Procedure Laterality Date    CATARACT EXTRACTION      COLONOSCOPY N/A 9/29/2015    Procedure: COLONOSCOPY;  Surgeon: FIDELINA Sanchez MD;  Location: [address]);  Service: Endoscopy;  Laterality: N/A;    EYE SURGERY      JOINT REPLACEMENT      right knee    KNEE SURGERY Left 12/31/2013    TKR    left parotidectomy      mixed tumor    SALIVARY GLAND SURGERY      TONSILLECTOMY         Immunization History   Administered Date(s) Administered    Influenza - High Dose 03/07/2018    Pneumococcal Conjugate - 13 Valent 03/07/2018    Tdap 03/07/2018       Review of patient's allergies indicates:  No Known Allergies  Current Facility-Administered Medications   Medication Frequency    acetaminophen tablet 650 mg Q8H PRN    albuterol-ipratropium 2.5mg-0.5mg/3mL nebulizer solution 3 mL Q4H PRN    albuterol-ipratropium 2.5mg-0.5mg/3mL nebulizer solution 3 mL Q4H    ceFEPIme injection 2 g Q8H    clopidogrel tablet 75 mg QHS    dextrose 50% injection 12.5 g PRN    dextrose 50% injection 25 g PRN    glucagon (human recombinant) injection 1 mg PRN    glucose chewable tablet 16 g PRN    glucose chewable tablet 24 g PRN    guaifenesin 100 mg/5 ml syrup 200 mg Q4H PRN    hydroxychloroquine tablet 400 mg Daily    lubiprostone capsule 24 mcg Daily with breakfast    methylPREDNISolone sodium succinate injection 125 mg Once    methylPREDNISolone sodium succinate injection 80 mg Q12H    ondansetron disintegrating tablet 8 mg Q8H PRN    pantoprazole EC tablet 40 mg Daily    promethazine-codeine 6.25-10 mg/5 ml syrup 5 mL Nightly PRN    ramelteon tablet 8 mg Nightly PRN    sodium chloride 0.9% flush 5 mL PRN    sulfamethoxazole-trimethoprim 400-80 mg/5 mL (BACTRIM) 340 mg in dextrose 5 % 340 mL IVPB Q8H    vancomycin (VANCOCIN) 1,500 mg in sodium  chloride 0.9% 250 mL IVPB Once    [START ON 4/12/2018] vancomycin 1 gram/250 mL in sodium chloride 0.9% IVPB 1 g Q12H     Family History     Problem Relation (Age of Onset)    Breast cancer Maternal Grandmother    Cancer Father    Heart disease Mother    Hypertension Mother    Prostate cancer Father        Social History Main Topics    Smoking status: Never Smoker    Smokeless tobacco: Never Used    Alcohol use No      Comment: very seldom     Drug use: No    Sexual activity: No      Comment: ,  age 52,      Review of Systems   Reason unable to perform ROS: Pt intubated.     Objective:     Vital Signs (Most Recent):  Temp: 97.9 °F (36.6 °C) (04/11/18 0850)  Pulse: (!) 120 (04/11/18 1214)  Resp: (!) 40 (04/11/18 1214)  BP: 127/66 (04/11/18 1131)  SpO2: (!) 88 % (04/11/18 1214)  O2 Device (Oxygen Therapy): Non Rebreather Mask (04/11/18 1214) Vital Signs (24h Range):  Temp:  [97.9 °F (36.6 °C)-99.4 °F (37.4 °C)] 97.9 °F (36.6 °C)  Pulse:  [] 120  Resp:  [20-40] 40  SpO2:  [80 %-100 %] 88 %  BP: (111-153)/(54-92) 127/66     Weight: 73.6 kg (162 lb 4.1 oz) (04/07/18 0400)  Body mass index is 27 kg/m².  Body surface area is 1.84 meters squared.      Intake/Output Summary (Last 24 hours) at 04/11/18 1254  Last data filed at 04/11/18 0700   Gross per 24 hour   Intake              600 ml   Output                0 ml   Net              600 ml       Physical Exam   Vitals reviewed.  Exam not perform, pt being intubated.     Significant Labs:  All pertinent lab results from the last 24 hours have been reviewed.    Significant Imaging:  Imaging results within the past 24 hours have been reviewed.     CT Chest/abd/pelvis:   Impression       1. Progression of bilateral patchy and confluent pulmonary consolidation compatible with pneumonia less likely edema or noninfectious inflammation.  There is air noted throughout the esophagus.  2. Stable markedly enlarged right thyroid lobe with large hypodense nodule  "measuring 3.2 x 2.4 x 4.7 cm.  3. Right renal cyst.  4. Enlarged lobular uterus with calcifications compatible with fibroids.  5. Additional findings as above.           CTa Chest    Impression         1. No evidence of pulmonary thromboembolism noting that definitive evaluation of the distal segmental and subsegmental branches, most notably at the lung bases, is  limited due to significant confluent and patchy consolidative change of the pulmonary parenchyma.  2. Patchy and more confluent consolidative change of the pulmonary parenchyma bilaterally, concerning for underlying infectious process such as pneumonia versus edema or non infectious inflammatory etiology.  3. Enlarged right thyroid lobe with substernal extension and large hypodense thyroid nodule, similar to prior CT neck of 05/31/2016.  There is subsequent mass effect on the adjacent trachea.  Further evaluation with ultrasound as clinically warranted.      CXR 2 view 4/10/18    Today's chest radiograph reveals the extrathoracic trachea displaced to the left by the patient's known enlarged right lobe of the thyroid gland.  Please see CT of 04/08/2018 for further information.    Multifocal patchy subsegmental opacities are present with striking peripheral and basilar predominance similar to CT of 04/08/2018, but more conspicuous than on chest radiograph of 04/05/2018.  This "reverse bat wing" distribution of disease is uncommon for community-acquired pneumonia or pulmonary edema.  Consider hypersensitivity pneumonia, eosinophilic pneumonia, organizing pneumonia and possibly parasitic disease.  In light of the patient's nonsmoking status, I doubt diffuse idiopathic pneumonia.  Radiographic features on CT are not typical for UIP, NSIP or sarcoidosis.    No pleural fluid or lymph node enlargement was present on recent CT.  I detect no pneumothorax, pneumomediastinum or pneumoperitoneum.   Impression       Persistently abnormal chest radiograph. "         Assessment/Plan:     Seropositive rheumatoid arthritis of multiple sites    Pt is an 83 yo with,  IBS,  anemia,PUD,fatty liver,  hashimotos thyroiditis/goiter,low vit d, h/o CVA, OA,  seropositive (RF+)  erosive RA currently admitted for pna complicated by respitatory failure    Do not suspect a rheumatoid flare at this time or RA/ILD, pt risk factors low. Do not suspect Humira related lung disease.   Suspect infectious etiology for respiratory decompensation.         Plan  - Agree with infectious disease evaluation and continue treatment with antibiotics  - steroids mangement per primary service.   - continue  mg/d   - continue supportive care, consult as needed.             Thank you for your consult. I will sign off. Please contact us if you have any additional questions.    Kamar Melvin MD  Rheumatology  [facility]

## 2018-04-11 NOTE — NURSING
Carlos from RRT at bedside. Patient returned from stat CT's. Sat at bedside with sister at side. Refused vital signs for this nurse.

## 2018-04-11 NOTE — PROCEDURES
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    Temp: 98.4 °F (36.9 °C) (04/11/18 1500)  Pulse: 95 (04/11/18 1515)  Resp: (!) 27 (04/11/18 1515)  BP: (!) 125/55 (04/11/18 1515)  SpO2: (!) 92 % (04/11/18 1515)  Weight: 73.6 kg (162 lb 4.1 oz) (04/07/18 0400)  Height: 5' 5" (165.1 cm) (04/07/18 0400)       Central Line  Date/Time: 4/11/2018 3:00 PM  Location procedure was performed: [facility] CARDI[facility] MEDICAL ICU ([facility])  Performed by: CARINA SOMMER  Assisting provider: JOVITA CROSS  Pre-operative Diagnosis: respiratory failure, shock  Consent Done: Yes  Time out: Immediately prior to procedure a "time out" was called to verify the correct patient, procedure, equipment, support staff and site/side marked as required.  Indications: med administration and vascular access  Anesthesia: local infiltration    Anesthesia:  Local Anesthetic: lidocaine 1% without epinephrine  Preparation: skin prepped with chlorhexidine (without alcohol)  Skin prep agent dried: skin prep agent completely dried prior to procedure  Sterile barriers: all five maximum sterile barriers used - cap, mask, sterile gown, sterile gloves, and large sterile sheet  Hand hygiene: hand hygiene performed prior to central venous catheter insertion  Location details: right internal jugular  Catheter type: triple lumen  Catheter size: 7 Fr  Catheter Length: 16cm    Ultrasound guidance: yes  Vessel Caliber: large, patent, compressibility normal  Needle advanced into vessel with real time Ultrasound guidance.  Guidewire confirmed in vessel.  Sterile sheath used.  Manometry: Yes  Number of attempts: 1  Post-procedure: chlorhexidine patch,  sterile dressing applied,  line sutured and blood return through all ports  Comments: Awaiting CXR to confirm placement. Patient tolerated procedure well.           Carina Sommer  4/11/2018  "

## 2018-04-11 NOTE — NURSING
NP for critical care came to bedside and evaluated patient for transfer to critical care. Report called spoke with winter

## 2018-04-11 NOTE — CONSULTS
Consult received and reviewed for pneumonia in RA patient s/p recent Humira receipt. Formal note to follow.    Carol Tan MD  Transplant ID Attending  [phone]

## 2018-04-11 NOTE — PLAN OF CARE
Problem: Patient Care Overview  Goal: Plan of Care Review  Patient upgraded today from the 9th floor s/t resp dist, intubated after failed bipap trail, central line and arterial lines placed, bronch done by critcal care team, patient sedated and paralyzed at this time, remains on Propofol, Fentanyl, Nimbex and Levophed, sister Vania at bedside and attentive to patient, VS and assessment per flow sheet, patient progressing towards goals as tolerated, plan of care reviewed with Selma Lux MD and family, all concerns addressed, will continue to monitor.

## 2018-04-11 NOTE — PLAN OF CARE
Problem: Anxiety (Adult)  Goal: Identify Related Risk Factors and Signs and Symptoms  Related risk factors and signs and symptoms are identified upon initiation of Human Response Clinical Practice Guideline (CPG)   Outcome: Ongoing (interventions implemented as appropriate)   04/11/18 0453   Anxiety   Related Risk Factors (Anxiety) psychosocial factor   Signs and Symptoms (Anxiety) agitation;nervousness/tension/restlessness;physical complaints/symptoms

## 2018-04-11 NOTE — SUBJECTIVE & OBJECTIVE
Interval History/Significant Events: transferred to MICU for respiratory distress and intubated    Review of Systems   Unable to perform ROS: Severe respiratory distress   Constitutional:        Limited due to resp. Distress  +SOB, cough. Denies sputum production, fevers, chills, chest pain.      Objective:     Vital Signs (Most Recent):  Temp: 97.9 °F (36.6 °C) (04/11/18 0850)  Pulse: (!) 120 (04/11/18 1253)  Resp: (!) 62 (04/11/18 1253)  BP: 127/66 (04/11/18 1131)  SpO2: 98 % (04/11/18 1253) Vital Signs (24h Range):  Temp:  [97.9 °F (36.6 °C)-99.4 °F (37.4 °C)] 97.9 °F (36.6 °C)  Pulse:  [] 120  Resp:  [20-62] 62  SpO2:  [80 %-100 %] 98 %  BP: (111-153)/(54-92) 127/66   Weight: 73.6 kg (162 lb 4.1 oz)  Body mass index is 27 kg/m².      Intake/Output Summary (Last 24 hours) at 04/11/18 1345  Last data filed at 04/11/18 0700   Gross per 24 hour   Intake              600 ml   Output                0 ml   Net              600 ml       Physical Exam   Constitutional: She appears well-developed. She has a sickly appearance. She appears ill. She appears distressed.   HENT:   Head: Normocephalic and atraumatic.   Eyes: Conjunctivae are normal. Pupils are equal, round, and reactive to light. Right eye exhibits no discharge. Left eye exhibits no discharge. No scleral icterus.   Neck: Neck supple. No JVD present. Thyromegaly present.   Cardiovascular: Regular rhythm, S1 normal and S2 normal.  Tachycardia present.    Pulses:       Radial pulses are 2+ on the right side, and 2+ on the left side.        Dorsalis pedis pulses are 2+ on the right side, and 2+ on the left side.   Warm extremities   Pulmonary/Chest: Accessory muscle usage present. Tachypnea noted. She is in respiratory distress. She has decreased breath sounds in the right lower field and the left lower field. She has no wheezes. She has rales in the right upper field, the right middle field, the left upper field and the left middle field.   Abdominal: Soft.  Bowel sounds are normal. She exhibits no distension. There is no tenderness. There is no guarding.   Lymphadenopathy:     She has no cervical adenopathy.   Neurological: She is alert. GCS eye subscore is 4. GCS verbal subscore is 5. GCS motor subscore is 6.   Alert, oriented x4. Very anxious appearing as in respiratory distress. Non focal, answering all questions and moving extremities equally with good strength    Skin: She is diaphoretic. There is pallor.       Vents:  Vent Mode: A/C (04/11/18 1340)  Set Rate: 20 bmp (04/11/18 1340)  Vt Set: 350 mL (04/11/18 1340)  PEEP/CPAP: 5 cmH20 (04/11/18 1340)  Oxygen Concentration (%): 100 (04/11/18 1253)  Lines/Drains/Airways     Drain                 NG/OG Tube 04/11/18 1340 Left mouth less than 1 day          Airway                 Airway - Non-Surgical 04/11/18 1337 Endotracheal Tube less than 1 day          Peripheral Intravenous Line                 Peripheral IV - Single Lumen 04/11/18 1305 Left Antecubital less than 1 day         Peripheral IV - Single Lumen 04/11/18 1307 Right Antecubital less than 1 day              Significant Labs:    CBC/Anemia Profile:    Recent Labs  Lab 04/10/18  0352 04/11/18  0409   WBC 30.05* 30.08*   HGB 9.6* 9.5*   HCT 31.7* 30.5*   * 373*   MCV 77* 76*   RDW 15.5* 15.5*        Chemistries:    Recent Labs  Lab 04/10/18  0352 04/11/18  0409   * 130*   K 4.2 4.1   CL 97 95   CO2 25 24   BUN 15 11   CREATININE 0.9 0.8   CALCIUM 8.5* 8.7   ALBUMIN 2.0* 2.0*   PROT 6.2 6.4   BILITOT 0.6 0.8   ALKPHOS 117 118   ALT 43 28   AST 14 12   MG 1.5* 1.5*   PHOS 3.0 3.1         Significant Imaging:  I have reviewed and interpreted all pertinent imaging results/findings within the past 24 hours.

## 2018-04-11 NOTE — ASSESSMENT & PLAN NOTE
--2/2 pneumonia, likely component of iatrogenic given amount of sedation need to keep synchronous with ventilator  --hold on 30 ml/kg bolus given degree of hypoxemia, and prior to intubation was not in shock  --start levophed and titrate to MAP >65  --Abx as above

## 2018-04-11 NOTE — SUBJECTIVE & OBJECTIVE
Interval History: does not feel well today. Continues to cough    Review of Systems  Constitutional: Negative for chills, fatigue, fever.   HENT: Negative for sore throat, trouble swallowing.    Eyes: Negative for photophobia, visual disturbance.   Respiratory: + cough, shortness of breath.    Cardiovascular: Negative for chest pain, palpitations, leg swelling.   Gastrointestinal: Negative for abdominal pain, constipation, diarrhea, nausea, vomiting.   Endocrine: Negative for cold intolerance, heat intolerance.   Genitourinary: Negative for dysuria, frequency.   Musculoskeletal: Negative for arthralgias, myalgias.   Skin: Negative for rash, wound   Neurological: Negative for dizziness, syncope, weakness, light-headedness.   Psychiatric/Behavioral: Negative for confusion, hallucinations, anxiety  All other systems reviewed and are negative.      Objective:     Vital Signs (Most Recent):  Temp: 99.4 °F (37.4 °C) (04/10/18 1618)  Pulse: 108 (04/10/18 1800)  Resp: 20 (04/10/18 1618)  BP: 139/70 (04/10/18 1618)  SpO2: (!) 86 % (04/10/18 1618) Vital Signs (24h Range):  Temp:  [97.9 °F (36.6 °C)-99.4 °F (37.4 °C)] 99.4 °F (37.4 °C)  Pulse:  [] 108  Resp:  [16-22] 20  SpO2:  [86 %-99 %] 86 %  BP: (120-149)/(57-70) 139/70     Weight: 73.6 kg (162 lb 4.1 oz)  Body mass index is 27 kg/m².    Intake/Output Summary (Last 24 hours) at 04/10/18 1904  Last data filed at 04/10/18 1100   Gross per 24 hour   Intake              860 ml   Output              950 ml   Net              -90 ml        Physical Exam  Constitutional: Appears well-developed and well-nourished.   Head: Normocephalic and atraumatic.   Mouth/Throat: Oropharynx is clear and moist.   Eyes: EOM are normal. Pupils are equal, round, and reactive to light. No scleral icterus.   Neck: Normal range of motion. Neck supple.   Cardiovascular: Normal rate and regular rhythm.  No murmur heard.  Pulmonary/Chest: On nasal cannula.  Rales in the LLL  Abdominal: Soft. Bowel  sounds are normal.  No distension or tenderness  Musculoskeletal: Normal range of motion. Mild  edema BLE  Neurological: Alert and oriented to person, place, and time.   Skin: Skin is warm and dry.   Psychiatric: Normal mood and affect. Behavior is normal.      MELD-Na score: 8 at 4/7/2018  4:08 AM  MELD score: 8 at 4/7/2018  4:08 AM  Calculated from:  Serum Creatinine: 0.8 mg/dL (Rounded to 1) at 4/7/2018  4:08 AM  Serum Sodium: 137 mmol/L at 4/7/2018  4:08 AM  Total Bilirubin: 0.5 mg/dL (Rounded to 1) at 4/7/2018  4:08 AM  INR(ratio): 1.2 at 4/5/2018  5:15 PM  Age: 82 years    Significant Labs:  CBC:    Recent Labs  Lab 04/09/18 0455 04/10/18  0352   WBC 26.03* 30.05*   HGB 10.0* 9.6*   HCT 32.5* 31.7*   * 381*     CMP:    Recent Labs  Lab 04/09/18 0455 04/10/18  0352    132*   K 3.7 4.2    97   CO2 26 25   GLU 81 94   BUN 16 15   CREATININE 0.9 0.9   CALCIUM 8.5* 8.5*   PROT 6.4 6.2   ALBUMIN 2.0* 2.0*   BILITOT 0.6 0.6   ALKPHOS 135 117   AST 38 14   ALT 72* 43   ANIONGAP 11 10   EGFRNONAA >60.0 >60.0     PTINR:  No results for input(s): INR in the last 48 hours.    Significant Procedures:   Dobutamine Stress Test with Color Flow: No results found for this or any previous visit.

## 2018-04-11 NOTE — HOSPITAL COURSE
Admitted to MICU on 4/11, intubated emergently for severe hypoxemia and respiratory distress. ID was consulted for assistance in management given immunocompromised state. Patient paralyzed with nimbex following persistent dysynchrony with vent despite sedation. Abx broadened to vancomycin, zosyn, bactrim, and posaconazole. Norepinephrine initiated for BP support. Bronchoscopy performed at bedside on 4/11 and all cultures including fungal cultures negative. Nimbex discontinued on 4/12. She continued to require significant oxygen support thereafter, and was unable to wean oxygen requirements significantly since intubation. However, she has made slow improvements in her oxygenation since 4/18. She went into A-fib RVR in 120-160's on the evening of 4/17, and was briefly started on amiodarone gtt; this discontinued and metoprolol added with good rate control on 4/18. SAT/SBT continue daily with difficulty weaning her off fentanyl as she gets agitated tachycardic and hypertensive ('s). Valium initiated in effort to wean fentanyl off. Patient extubated on 4/22 to nasal cannula  4/24/2018 - Pt with chills/cold overnight & refusing meds, willing to take meds this morning. Hill placed this AM with good UOP. Pt still NPO per swallowing trial. Free water deficit 2.8L, starting with 500mL D5, will reassess, Pt oriented x3 this morning.

## 2018-04-11 NOTE — NURSING
Patient denies pain when asked by nurse and when patient talks to family members on the phone she tells daughters that she is in pain and patient refuses baclofen and gabapentin for the last couple of evenings.

## 2018-04-11 NOTE — CARE UPDATE
Rapid Response Nurse Note     Rapid Response Metrics:     Admit Date: 2018  LOS: 6  Code Status: Full Code   Date of Consult: 2018  : 1935  Age: 82 y.o.  Weight:   Wt Readings from Last 1 Encounters:   18 73.6 kg (162 lb 4.1 oz)     Race: AA  Sex: female  Bed: [facility]/308 A:   MRN: [id number]  Time Rapid Response Team page Received: 1045  Time Rapid Response Team at Bedside: 1120  Time Rapid Response Team left Bedside: 1235  Was the patient discharged from an ICU this admission? no   Was the patient discharged from a PACU within last 24 hours? no  Did the patient receive conscious sedation/general anesthesia within last 24 hours? no  Was the patient in the ED within the past 24 hours? no  Was the patient started on NIPPV within the past 24 hours? no  Did this progress into an ARC or CPA: no  Attending Physician: Kvng Orozco MD  Primary Service: [facility] MED A  Consult Requested By: Kvng Orozco MD   Rapid Response Indication(s): respiratory distress  Disposition: transfer to ICU 3081    SITUATION:     Reason for Call:   Called to evaluate the patient for Respiratory    BACKGROUND:     Why is the patient in the hospital?: Pneumonia  What changed?: Called to bedside for worsening SOB    ASSESSMENT:     What did you find: Received phone call from primary RN for concern for respiratory distress. I arrived to bedside to assess patient after she returned from CT scan. She was sitting on sofa. She was AAOx3 but agitated. She initially refused VS from primary RN but eventually allowed me to obtain VS. She was tachypnic RR-40/min SpO2-80 on NC 6 Lpm, labored respirations, restless, agitated. Respiratory was contacted and placed patient on NRB with improvement in SpO2-90%. Dr. Orozco was notified of patient status and CCS contacted. CCS to bedside (RAMAKRISHNA Rodney and Dr. Meraz). Patient refused ABG, Neb tx given. Patient then transported with continuous cardiac monitor, SpO2, and  oxygen to 3081; she tolerated transport without complications.    RECOMMENDATIONS:     We recommend: none    FOLLOW-UP/CONTINGENCY PLAN:       Call the rapid response Nurse at x [phone] for additional questions or concerns.      PHYSICIAN ESCALATION:     Orders received and case discussed with RAMAKRISHNA Rodney NP    Disposition: Transfer 3081

## 2018-04-11 NOTE — NURSING
Patient short of breath and sat was low 80,s and physician here. Charge nurse here and assessing patient also. Heart rate in 120 range. Physician stated she is okay with sat of 88% and stat CT ordered of chest ,abdomen and pelvis. Ct notified of stat order. Assessment on going.

## 2018-04-11 NOTE — PT/OT/SLP PROGRESS
Physical Therapy  Pt Not Seen    Patient Name:  Selma Lux MD   MRN:  [id number]    Patient not seen today secondary  Other (Comment) (Pt transferred to CCCU on this date.  PT to discontinue orders and write full d/c summary.  ).     Alam Delia Harrington, PTA  4/11/2018

## 2018-04-11 NOTE — PROGRESS NOTES
Pt tachypnic in the 30's and sating in the low 80's; placed on non-rebreather at 15 liters to bring sats up to 90%; still tachypnic but breathing better; used directed imaging to help lower her breathing rate.

## 2018-04-11 NOTE — SUBJECTIVE & OBJECTIVE
Past Medical History:   Diagnosis Date    Anemia     Arthritis     Hashimoto's disease     Hypertension     IGT (impaired glucose tolerance) 1/12/2016    Joint pain     Multinodular goiter 1/12/2016    Stroke        Past Surgical History:   Procedure Laterality Date    CATARACT EXTRACTION      COLONOSCOPY N/A 9/29/2015    Procedure: COLONOSCOPY;  Surgeon: FIDELINA Sanchez MD;  Location: [facility] ([address] [place] [address]);  Service: Endoscopy;  Laterality: N/A;    EYE SURGERY      JOINT REPLACEMENT      right knee    KNEE SURGERY Left 12/31/2013    TKR    left parotidectomy      mixed tumor    SALIVARY GLAND SURGERY      TONSILLECTOMY         Immunization History   Administered Date(s) Administered    Influenza - High Dose 03/07/2018    Pneumococcal Conjugate - 13 Valent 03/07/2018    Tdap 03/07/2018       Review of patient's allergies indicates:  No Known Allergies  Current Facility-Administered Medications   Medication Frequency    acetaminophen tablet 650 mg Q8H PRN    albuterol-ipratropium 2.5mg-0.5mg/3mL nebulizer solution 3 mL Q4H PRN    albuterol-ipratropium 2.5mg-0.5mg/3mL nebulizer solution 3 mL Q4H    ceFEPIme injection 2 g Q8H    clopidogrel tablet 75 mg QHS    dextrose 50% injection 12.5 g PRN    dextrose 50% injection 25 g PRN    glucagon (human recombinant) injection 1 mg PRN    glucose chewable tablet 16 g PRN    glucose chewable tablet 24 g PRN    guaifenesin 100 mg/5 ml syrup 200 mg Q4H PRN    hydroxychloroquine tablet 400 mg Daily    lubiprostone capsule 24 mcg Daily with breakfast    methylPREDNISolone sodium succinate injection 125 mg Once    methylPREDNISolone sodium succinate injection 80 mg Q12H    ondansetron disintegrating tablet 8 mg Q8H PRN    pantoprazole EC tablet 40 mg Daily    promethazine-codeine 6.25-10 mg/5 ml syrup 5 mL Nightly PRN    ramelteon tablet 8 mg Nightly PRN    sodium chloride 0.9% flush 5 mL PRN    sulfamethoxazole-trimethoprim 400-80 mg/5 mL  (BACTRIM) 340 mg in dextrose 5 % 340 mL IVPB Q8H    vancomycin (VANCOCIN) 1,500 mg in sodium chloride 0.9% 250 mL IVPB Once    [START ON 4/12/2018] vancomycin 1 gram/250 mL in sodium chloride 0.9% IVPB 1 g Q12H     Family History     Problem Relation (Age of Onset)    Breast cancer Maternal Grandmother    Cancer Father    Heart disease Mother    Hypertension Mother    Prostate cancer Father        Social History Main Topics    Smoking status: Never Smoker    Smokeless tobacco: Never Used    Alcohol use No      Comment: very seldom     Drug use: No    Sexual activity: No      Comment: ,  age 52,      Review of Systems   Reason unable to perform ROS: Pt intubated.     Objective:     Vital Signs (Most Recent):  Temp: 97.9 °F (36.6 °C) (04/11/18 0850)  Pulse: (!) 120 (04/11/18 1214)  Resp: (!) 40 (04/11/18 1214)  BP: 127/66 (04/11/18 1131)  SpO2: (!) 88 % (04/11/18 1214)  O2 Device (Oxygen Therapy): Non Rebreather Mask (04/11/18 1214) Vital Signs (24h Range):  Temp:  [97.9 °F (36.6 °C)-99.4 °F (37.4 °C)] 97.9 °F (36.6 °C)  Pulse:  [] 120  Resp:  [20-40] 40  SpO2:  [80 %-100 %] 88 %  BP: (111-153)/(54-92) 127/66     Weight: 73.6 kg (162 lb 4.1 oz) (04/07/18 0400)  Body mass index is 27 kg/m².  Body surface area is 1.84 meters squared.      Intake/Output Summary (Last 24 hours) at 04/11/18 1254  Last data filed at 04/11/18 0700   Gross per 24 hour   Intake              600 ml   Output                0 ml   Net              600 ml       Physical Exam   Vitals reviewed.  Exam not perform, pt being intubated.     Significant Labs:  All pertinent lab results from the last 24 hours have been reviewed.    Significant Imaging:  Imaging results within the past 24 hours have been reviewed.     CT Chest/abd/pelvis:   Impression       1. Progression of bilateral patchy and confluent pulmonary consolidation compatible with pneumonia less likely edema or noninfectious inflammation.  There is air noted throughout  "the esophagus.  2. Stable markedly enlarged right thyroid lobe with large hypodense nodule measuring 3.2 x 2.4 x 4.7 cm.  3. Right renal cyst.  4. Enlarged lobular uterus with calcifications compatible with fibroids.  5. Additional findings as above.           CTa Chest    Impression         1. No evidence of pulmonary thromboembolism noting that definitive evaluation of the distal segmental and subsegmental branches, most notably at the lung bases, is  limited due to significant confluent and patchy consolidative change of the pulmonary parenchyma.  2. Patchy and more confluent consolidative change of the pulmonary parenchyma bilaterally, concerning for underlying infectious process such as pneumonia versus edema or non infectious inflammatory etiology.  3. Enlarged right thyroid lobe with substernal extension and large hypodense thyroid nodule, similar to prior CT neck of 05/31/2016.  There is subsequent mass effect on the adjacent trachea.  Further evaluation with ultrasound as clinically warranted.      CXR 2 view 4/10/18    Today's chest radiograph reveals the extrathoracic trachea displaced to the left by the patient's known enlarged right lobe of the thyroid gland.  Please see CT of 04/08/2018 for further information.    Multifocal patchy subsegmental opacities are present with striking peripheral and basilar predominance similar to CT of 04/08/2018, but more conspicuous than on chest radiograph of 04/05/2018.  This "reverse bat wing" distribution of disease is uncommon for community-acquired pneumonia or pulmonary edema.  Consider hypersensitivity pneumonia, eosinophilic pneumonia, organizing pneumonia and possibly parasitic disease.  In light of the patient's nonsmoking status, I doubt diffuse idiopathic pneumonia.  Radiographic features on CT are not typical for UIP, NSIP or sarcoidosis.    No pleural fluid or lymph node enlargement was present on recent CT.  I detect no pneumothorax, pneumomediastinum or " pneumoperitoneum.   Impression       Persistently abnormal chest radiograph.

## 2018-04-11 NOTE — ASSESSMENT & PLAN NOTE
--medication induced given need for sedation, opiates s/p intubation and need for synchrony with ventilator   --neurologically intact prior to intubation

## 2018-04-11 NOTE — PROGRESS NOTES
[facility]  Infectious Disease  Progress Note    Patient Name: Selma Alonzo Lux MD  MRN: [id number]  Admission Date: 4/5/2018  Length of Stay: 6 days  Attending Physician: Patricia Kirkpatrick MD  Primary Care Provider: Bhargav Hirsch MD    Isolation Status: No active isolations  Assessment/Plan:      * Acute hypoxemic respiratory failure    81yo woman w/a history of prior CVA, RA (dx 2012; RF seropositive, erosive; on HCQ/prednisone 5-10mg and recently started humira first dose 3/22/2018), IBS, and Hasimotos thyroiditis (goiter) who was admitted on 4/5/2018 with 2 days of dry cough and progressive SOB due to multifocal PNA (with peripheral and basilar lesions noted on CT chest) that has progressively worsened despite CAP coverage over the last 5 days, requiring intubation today for hypoxic RF. She is critically ill and will expand coverage for possibilities on differential (most notable for MDRO bacterial HCAP/HAP, IFI including Aspergillus and endemic fungi with PCP less likely given steroid dose, and  vs hypersensitivity pneumonitis).     - will broaden coverage for HCAP to vanc/zosyn -- she has completed 5 days of atypical coverage with azithromycin already  - will initiate posaconazole for coverage of the above mold species and will send fungal markers  - agree with empiric PCP treatment with bactrim/steroids although not classic for PCP  - await pending bronchoscopy for cultures, cytology with GMS, and surg-path if possible  - will tailor therapy with you as she hopefully improves            Anticipated Disposition: pending improvement    Thank you for your consult. I will follow-up with patient. Please contact us if you have any additional questions.     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 She was found to have multifocal PNA on CXR (with peripheral and basilar lesions noted on CT chest) and was started on CTX/azithro. She has completed ~6 days of therapy with progressively worsening hypoxia, prompting ID consultation today. My interview with Dr. Lux and her sister was cut short due to intubation for hypoxic RF (limited exposure/social history). After discussion with the ICU team, her coverage was broadened to empiric vanc/zosyn for possible HAP, posaconazole for IFI, and bactrim/steroids for atypical PCP vs . Bronchoscopy is planned for later today.   Past Medical History:   Diagnosis Date    Anemia     Arthritis     Hashimoto's disease     Hypertension     IGT (impaired glucose tolerance) 1/12/2016    Joint pain     Multinodular goiter 1/12/2016    Stroke        Past Surgical History:   Procedure Laterality Date    CATARACT EXTRACTION      COLONOSCOPY N/A 9/29/2015    Procedure: COLONOSCOPY;  Surgeon: FIDELINA Sanchez MD;  Location: [facility] ([address] [place] [address]);  Service: Endoscopy;  Laterality: N/A;    EYE SURGERY      JOINT REPLACEMENT      right knee    KNEE SURGERY Left 12/31/2013    TKR    left parotidectomy      mixed tumor    SALIVARY GLAND SURGERY      TONSILLECTOMY         Review of patient's allergies indicates:  No Known Allergies    Medications:  Facility-Administered Medications Prior to Admission   Medication    onabotulinumtoxina injection 200 Units     Prescriptions Prior to Admission   Medication Sig    amlodipine (NORVASC) 5 MG tablet TAKE 1 TABLET DAILY    baclofen (LIORESAL) 10 MG tablet Take 1 tablet (10 mg total) by mouth 3 (three) times daily.    clopidogrel (PLAVIX) 75 mg tablet Take 1 tablet (75 mg  total) by mouth once daily.    diazePAM (VALIUM) 2 MG tablet Take 1 tablet (2 mg total) by mouth every evening.    [] doxycycline (VIBRA-TABS) 100 MG tablet Take 1 tablet (100 mg total) by mouth 2 (two) times daily. Substitute monohydrate depending on insurance coverage    ferrous sulfate 325 (65 FE) MG EC tablet Take 325 mg by mouth once daily.     gabapentin (NEURONTIN) 300 MG capsule Take 1-2 capsules (300-600 mg total) by mouth 3 (three) times daily.    hydroxychloroquine (PLAQUENIL) 200 mg tablet TAKE 2 TABLETS ONCE DAILY    linaclotide (LINZESS) 145 mcg Cap capsule Take 1 capsule (145 mcg total) by mouth once daily.    montelukast (SINGULAIR) 10 mg tablet TAKE 1 TABLET EVERY EVENING    omega 3-dha-epa-fish oil 250-350-1,000 mg Cap Take 1 capsule by mouth 2 (two) times daily.    omeprazole (PRILOSEC) 20 MG capsule Take 1 capsule (20 mg total) by mouth once daily.    predniSONE (DELTASONE) 5 MG tablet TAKE 2 TABLETS ONCE DAILY (Patient taking differently: TAKE 1 TABLETS ONCE DAILY)    thiamine 100 MG tablet Take 1 tablet (100 mg total) by mouth once daily.    adalimumab (HUMIRA PEN) PnKt injection Inject 0.8 mLs (40 mg total) into the skin every 14 (fourteen) days.    albuterol 90 mcg/actuation inhaler Inhale 2 puffs into the lungs every 6 (six) hours as needed for Wheezing or Shortness of Breath.    benzonatate (TESSALON) 200 MG capsule Take 1 capsule (200 mg total) by mouth 3 (three) times daily as needed for Cough.    [] doxycycline (VIBRA-TABS) 100 MG tablet Take 1 tablet (100 mg total) by mouth 2 (two) times daily. Substitute monohydrate depending on insurance coverage    hydrocodone-acetaminophen 5-325mg (NORCO) 5-325 mg per tablet Take 1 tablet by mouth every 24 hours as needed for Pain.     Antibiotics     Start     Stop Route Frequency Ordered    18 0130  vancomycin 1 gram/250 mL in sodium chloride 0.9% IVPB 1 g       0129 IV Every 12 hours (non-standard times)  04/11/18 1219    04/11/18 1400  sulfamethoxazole-trimethoprim 400-80 mg/5 mL (BACTRIM) 340 mg in dextrose 5 % 340 mL IVPB      -- IV Every 8 hours (non-standard times) 04/11/18 1254    04/11/18 1400  piperacillin-tazobactam 4.5 g in sodium chloride 0.9% 100 mL IVPB (ready to mix system)      04/19 1359 IV Every 8 hours (non-standard times) 04/11/18 1351        Antifungals     Start     Stop Route Frequency Ordered    04/11/18 2100  posaconazole 300 mg in dextrose 5 % 150 mL infusion      -- IV Daily 04/11/18 1403        Antivirals     None           Immunization History   Administered Date(s) Administered    Influenza - High Dose 03/07/2018    Pneumococcal Conjugate - 13 Valent 03/07/2018    Tdap 03/07/2018       Family History     Problem Relation (Age of Onset)    Breast cancer Maternal Grandmother    Cancer Father    Heart disease Mother    Hypertension Mother    Prostate cancer Father        Social History     Social History    Marital status:      Spouse name: N/A    Number of children: N/A    Years of education: N/A     Social History Main Topics    Smoking status: Never Smoker    Smokeless tobacco: Never Used    Alcohol use No      Comment: very seldom     Drug use: No    Sexual activity: No      Comment: ,  age 52,      Other Topics Concern    None     Social History Narrative    None     Review of Systems   Unable to perform ROS: Acuity of condition     Objective:     Vital Signs (Most Recent):  Temp: 98.4 °F (36.9 °C) (04/11/18 1500)  Pulse: 96 (04/11/18 1637)  Resp: (!) 23 (04/11/18 1637)  BP: 134/62 (04/11/18 1637)  SpO2: (!) 92 % (04/11/18 1637) Vital Signs (24h Range):  Temp:  [97.9 °F (36.6 °C)-99.4 °F (37.4 °C)] 98.4 °F (36.9 °C)  Pulse:  [] 96  Resp:  [20-45] 23  SpO2:  [80 %-100 %] 92 %  BP: ()/(44-93) 134/62  Arterial Line BP: (108)/(53) 108/53     Weight: 73.6 kg (162 lb 4.1 oz)  Body mass index is 27 kg/m².    Estimated Creatinine Clearance: 56.3 mL/min  (based on SCr of 0.8 mg/dL).    Physical Exam   Constitutional: She appears well-developed. She has a sickly appearance. She appears ill. She appears distressed.   HENT:   Head: Normocephalic and atraumatic.   Eyes: Conjunctivae are normal. Pupils are equal, round, and reactive to light. Right eye exhibits no discharge. Left eye exhibits no discharge. No scleral icterus.   Neck: Neck supple. No JVD present. Thyromegaly present.   Cardiovascular: Regular rhythm, S1 normal and S2 normal.  Tachycardia present.    Pulses:       Radial pulses are 2+ on the right side, and 2+ on the left side.        Dorsalis pedis pulses are 2+ on the right side, and 2+ on the left side.   Warm extremities   Pulmonary/Chest: Accessory muscle usage present. Tachypnea noted. She is in respiratory distress. She has decreased breath sounds in the right lower field and the left lower field. She has no wheezes. She has rales in the right upper field, the right middle field, the left upper field and the left middle field.   Abdominal: Soft. Bowel sounds are normal. She exhibits no distension. There is no tenderness. There is no guarding.   Lymphadenopathy:     She has no cervical adenopathy.   Neurological: She is alert. GCS eye subscore is 4. GCS verbal subscore is 5. GCS motor subscore is 6.   Alert, oriented x4. Very anxious appearing as in respiratory distress. Non focal, answering all questions and moving extremities equally with good strength    Skin: She is diaphoretic. There is pallor.       Significant Labs:   CBC:   Recent Labs  Lab 04/10/18  0352 04/11/18  0409 04/11/18  1425   WBC 30.05* 30.08* 35.14*   HGB 9.6* 9.5* 9.4*   HCT 31.7* 30.5* 29.6*   * 373* 352*     CMP:   Recent Labs  Lab 04/10/18  0352 04/11/18  0409 04/11/18  1425   * 130* 129*   K 4.2 4.1 3.8   CL 97 95 96   CO2 25 24 25   GLU 94 86 97   BUN 15 11 9   CREATININE 0.9 0.8 0.8   CALCIUM 8.5* 8.7 8.2*   PROT 6.2 6.4  --    ALBUMIN 2.0* 2.0*  --    BILITOT  0.6 0.8  --    ALKPHOS 117 118  --    AST 14 12  --    ALT 43 28  --    ANIONGAP 10 11 8   EGFRNONAA >60.0 >60.0 >60.0       Significant Imaging: I have reviewed all pertinent imaging results/findings within the past 24 hours.     CT chest:  1. Progression of bilateral patchy and confluent pulmonary consolidation compatible with pneumonia less likely edema or noninfectious inflammation.  There is air noted throughout the esophagus.  2. Stable markedly enlarged right thyroid lobe with large hypodense nodule measuring 3.2 x 2.4 x 4.7 cm.  3. Right renal cyst.  4. Enlarged lobular uterus with calcifications compatible with fibroids.  5. Additional findings as above.    Microbiology:  4/5 blood cx negative  4/5 urine cx negative  4/10 C.diff negative  4/11 blood cx negatiev  4/11 BAL cx pending

## 2018-04-11 NOTE — NURSING
Patient complained of feeling dehydrated. Fluids orally encouraged and instructed every sip helps. Call placed to physician and also informed of same . Patient aware physician will discuss with her on rounds. Assessment on going. Turgor good and moist mucous membranes noted.

## 2018-04-11 NOTE — ASSESSMENT & PLAN NOTE
Pt is an 81 yo with,  IBS,  anemia,PUD,fatty liver,  hashimotos thyroiditis/goiter,low vit d, h/o CVA, OA,  seropositive (RF+)  erosive RA currently admitted for pna complicated by respitatory failure    Do not suspect a rheumatoid flare at this time or RA/ILD, pt risk factors low.  Suspect infectious etiology for respiratory decompensation.         Plan  - Agree with infectious disease evaluation and continue treatment with antibiotics  - steroids mangement per primary service.   - continue  mg/d   - continue supportive care, consult as needed.

## 2018-04-11 NOTE — SUBJECTIVE & OBJECTIVE
Past Medical History:   Diagnosis Date    Anemia     Arthritis     Hashimoto's disease     Hypertension     IGT (impaired glucose tolerance) 2016    Joint pain     Multinodular goiter 2016    Stroke        Past Surgical History:   Procedure Laterality Date    CATARACT EXTRACTION      COLONOSCOPY N/A 2015    Procedure: COLONOSCOPY;  Surgeon: FIDELINA Sanchez MD;  Location: [facility] ([address] [place] [address]);  Service: Endoscopy;  Laterality: N/A;    EYE SURGERY      JOINT REPLACEMENT      right knee    KNEE SURGERY Left 2013    TKR    left parotidectomy      mixed tumor    SALIVARY GLAND SURGERY      TONSILLECTOMY         Review of patient's allergies indicates:  No Known Allergies    Medications:  Facility-Administered Medications Prior to Admission   Medication    onabotulinumtoxina injection 200 Units     Prescriptions Prior to Admission   Medication Sig    amlodipine (NORVASC) 5 MG tablet TAKE 1 TABLET DAILY    baclofen (LIORESAL) 10 MG tablet Take 1 tablet (10 mg total) by mouth 3 (three) times daily.    clopidogrel (PLAVIX) 75 mg tablet Take 1 tablet (75 mg total) by mouth once daily.    diazePAM (VALIUM) 2 MG tablet Take 1 tablet (2 mg total) by mouth every evening.    [] doxycycline (VIBRA-TABS) 100 MG tablet Take 1 tablet (100 mg total) by mouth 2 (two) times daily. Substitute monohydrate depending on insurance coverage    ferrous sulfate 325 (65 FE) MG EC tablet Take 325 mg by mouth once daily.     gabapentin (NEURONTIN) 300 MG capsule Take 1-2 capsules (300-600 mg total) by mouth 3 (three) times daily.    hydroxychloroquine (PLAQUENIL) 200 mg tablet TAKE 2 TABLETS ONCE DAILY    linaclotide (LINZESS) 145 mcg Cap capsule Take 1 capsule (145 mcg total) by mouth once daily.    montelukast (SINGULAIR) 10 mg tablet TAKE 1 TABLET EVERY EVENING    omega 3-dha-epa-fish oil 250-350-1,000 mg Cap Take 1 capsule by mouth 2 (two) times daily.    omeprazole (PRILOSEC) 20 MG  capsule Take 1 capsule (20 mg total) by mouth once daily.    predniSONE (DELTASONE) 5 MG tablet TAKE 2 TABLETS ONCE DAILY (Patient taking differently: TAKE 1 TABLETS ONCE DAILY)    thiamine 100 MG tablet Take 1 tablet (100 mg total) by mouth once daily.    adalimumab (HUMIRA PEN) PnKt injection Inject 0.8 mLs (40 mg total) into the skin every 14 (fourteen) days.    albuterol 90 mcg/actuation inhaler Inhale 2 puffs into the lungs every 6 (six) hours as needed for Wheezing or Shortness of Breath.    benzonatate (TESSALON) 200 MG capsule Take 1 capsule (200 mg total) by mouth 3 (three) times daily as needed for Cough.    [] doxycycline (VIBRA-TABS) 100 MG tablet Take 1 tablet (100 mg total) by mouth 2 (two) times daily. Substitute monohydrate depending on insurance coverage    hydrocodone-acetaminophen 5-325mg (NORCO) 5-325 mg per tablet Take 1 tablet by mouth every 24 hours as needed for Pain.     Antibiotics     Start     Stop Route Frequency Ordered    18 0130  vancomycin 1 gram/250 mL in sodium chloride 0.9% IVPB 1 g       0129 IV Every 12 hours (non-standard times) 18 1219    18 1400  sulfamethoxazole-trimethoprim 400-80 mg/5 mL (BACTRIM) 340 mg in dextrose 5 % 340 mL IVPB      -- IV Every 8 hours (non-standard times) 18 1254    18 1400  piperacillin-tazobactam 4.5 g in sodium chloride 0.9% 100 mL IVPB (ready to mix system)       1359 IV Every 8 hours (non-standard times) 18 1351        Antifungals     Start     Stop Route Frequency Ordered    18 2100  posaconazole 300 mg in dextrose 5 % 150 mL infusion      -- IV Daily 18 1403        Antivirals     None           Immunization History   Administered Date(s) Administered    Influenza - High Dose 2018    Pneumococcal Conjugate - 13 Valent 2018    Tdap 2018       Family History     Problem Relation (Age of Onset)    Breast cancer Maternal Grandmother    Cancer Father    Heart  disease Mother    Hypertension Mother    Prostate cancer Father        Social History     Social History    Marital status:      Spouse name: N/A    Number of children: N/A    Years of education: N/A     Social History Main Topics    Smoking status: Never Smoker    Smokeless tobacco: Never Used    Alcohol use No      Comment: very seldom     Drug use: No    Sexual activity: No      Comment: ,  age 52,      Other Topics Concern    None     Social History Narrative    None     Review of Systems   Unable to perform ROS: Acuity of condition     Objective:     Vital Signs (Most Recent):  Temp: 98.4 °F (36.9 °C) (04/11/18 1500)  Pulse: 96 (04/11/18 1637)  Resp: (!) 23 (04/11/18 1637)  BP: 134/62 (04/11/18 1637)  SpO2: (!) 92 % (04/11/18 1637) Vital Signs (24h Range):  Temp:  [97.9 °F (36.6 °C)-99.4 °F (37.4 °C)] 98.4 °F (36.9 °C)  Pulse:  [] 96  Resp:  [20-45] 23  SpO2:  [80 %-100 %] 92 %  BP: ()/(44-93) 134/62  Arterial Line BP: (108)/(53) 108/53     Weight: 73.6 kg (162 lb 4.1 oz)  Body mass index is 27 kg/m².    Estimated Creatinine Clearance: 56.3 mL/min (based on SCr of 0.8 mg/dL).    Physical Exam   Constitutional: She appears well-developed. She has a sickly appearance. She appears ill. She appears distressed.   HENT:   Head: Normocephalic and atraumatic.   Eyes: Conjunctivae are normal. Pupils are equal, round, and reactive to light. Right eye exhibits no discharge. Left eye exhibits no discharge. No scleral icterus.   Neck: Neck supple. No JVD present. Thyromegaly present.   Cardiovascular: Regular rhythm, S1 normal and S2 normal.  Tachycardia present.    Pulses:       Radial pulses are 2+ on the right side, and 2+ on the left side.        Dorsalis pedis pulses are 2+ on the right side, and 2+ on the left side.   Warm extremities   Pulmonary/Chest: Accessory muscle usage present. Tachypnea noted. She is in respiratory distress. She has decreased breath sounds in the right  lower field and the left lower field. She has no wheezes. She has rales in the right upper field, the right middle field, the left upper field and the left middle field.   Abdominal: Soft. Bowel sounds are normal. She exhibits no distension. There is no tenderness. There is no guarding.   Lymphadenopathy:     She has no cervical adenopathy.   Neurological: She is alert. GCS eye subscore is 4. GCS verbal subscore is 5. GCS motor subscore is 6.   Alert, oriented x4. Very anxious appearing as in respiratory distress. Non focal, answering all questions and moving extremities equally with good strength    Skin: She is diaphoretic. There is pallor.       Significant Labs:   CBC:   Recent Labs  Lab 04/10/18  0352 04/11/18  0409 04/11/18  1425   WBC 30.05* 30.08* 35.14*   HGB 9.6* 9.5* 9.4*   HCT 31.7* 30.5* 29.6*   * 373* 352*     CMP:   Recent Labs  Lab 04/10/18  0352 04/11/18  0409 04/11/18  1425   * 130* 129*   K 4.2 4.1 3.8   CL 97 95 96   CO2 25 24 25   GLU 94 86 97   BUN 15 11 9   CREATININE 0.9 0.8 0.8   CALCIUM 8.5* 8.7 8.2*   PROT 6.2 6.4  --    ALBUMIN 2.0* 2.0*  --    BILITOT 0.6 0.8  --    ALKPHOS 117 118  --    AST 14 12  --    ALT 43 28  --    ANIONGAP 10 11 8   EGFRNONAA >60.0 >60.0 >60.0       Significant Imaging: I have reviewed all pertinent imaging results/findings within the past 24 hours.     CT chest:  1. Progression of bilateral patchy and confluent pulmonary consolidation compatible with pneumonia less likely edema or noninfectious inflammation.  There is air noted throughout the esophagus.  2. Stable markedly enlarged right thyroid lobe with large hypodense nodule measuring 3.2 x 2.4 x 4.7 cm.  3. Right renal cyst.  4. Enlarged lobular uterus with calcifications compatible with fibroids.  5. Additional findings as above.    Microbiology:  4/5 blood cx negative  4/5 urine cx negative  4/10 C.diff negative  4/11 blood cx negatiev  4/11 BAL cx pending

## 2018-04-11 NOTE — ANESTHESIA PROCEDURE NOTES
Intubation    Diagnosis: respiratory distress  Patient location during procedure: ICU  Procedure start time: 4/11/2018 2:30 PM  Timeout: 4/11/2018 1:30 PM  Procedure end time: 4/11/2018 2:35 PM  Staffing  Anesthesiologist: SRUTHI LINN  Resident/CRNA: HENRI PALACIOS  Other anesthesia staff: SOLOMON REBOLLEDO  Performed: resident/CRNA   Anesthesiologist was present at the time of the procedure.  Preanesthetic Checklist  Completed: patient identified, site marked, surgical consent, pre-op evaluation, timeout performed, IV checked, risks and benefits discussed, monitors and equipment checked and anesthesia consent given  Intubation  Indication: respiratory distress  Pre-oxygenation. Induction: intravenous, mask ventilation: easy mask.  Intubation: postinduction, laryngoscopy video-assisted, Yahaira 3.  Endotracheal Tube: oral, 7.5 mm ID, cuffed (inflated to minimal occlusive pressure)  Attempts: 2, Grade I - full view of cords  Complicating Factors: deviated trachea  Tube secured at 22 cm at the lips.  Findings post-intubation: bilateral breath sounds  Position Confirmation: auscultation

## 2018-04-11 NOTE — ASSESSMENT & PLAN NOTE
--suspect 2/2 severe bilateral pneumonia in the setting of immunosuppression.   --severely worsened over past week despite CAP treatment.   --s/p emergent intubation on 4/11 upon MICU admission for severe hypoxemia and respiratory distress.   --continue mechanical ventilation, wean support as tolerated  --plan for bronchoscopy with wash, cultures  --start Vancomycin, Zosyn, Bactrim, Posiconazole. We appreciate ID assistance with management  --follow up blood, sputum cultures.

## 2018-04-11 NOTE — H&P
[facility]  Critical Care Medicine  History & Physical    Patient Name: Selma Rosado Lux, MD  MRN: [id number]  Admission Date: 4/5/2018  Hospital Length of Stay: 6 days  Code Status: Full Code  Attending Physician: Patricia Kirkpatrick MD   Primary Care Provider: Bhargav Hirsch MD   Principal Problem: Acute hypoxemic respiratory failure    Subjective:     HPI:  Mrs. Lux is a 83 yo female with history significant for CVA, RA on plaquenil/prednisone and recently started on Humiria (3/22), HTN, and large goiter who originally presented to [facility] ED on 4/5 with complaints of continually worsening shortness of breath, cough, and fatigue that started about 2 weeks prior. She was seen by her PCP on 4/2 for these complaints and received a prescription for duo nebs and tessalon perles. CXR performed on 4/2 noted bilateral basilar infiltrates. At that time, she denied fever, chills, sputum production, sick contacts. She was admitted to  and treated emprically for CAP with rocephin and azithromycin. She was also diuresed daily. However, her oxygen requirements slowly increased from 2 L NC to 4 L NC with also progressively worsening bilateral infiltrates on imaging. CTA performed on 4/9 negative for PE, however noted significant bilateral consolidations. On 4/11, her hypoxemia continued to worsen, requiring NRB mask, and she developed severe respiratory distress. She was transferred to the MICU and emergently intubated. Repeat CT chest performed on 4/11 demonstrated progressive worsening of bilateral peripheral infiltrates.    Hospital/ICU Course:  Admitted to MICU on 4/11, intubated emergently for severe hypoxemia and respiratory distress. ID was consulted for assistance in management given immunocompromised state. Abx broadened to Vanco, Zosyn, Bactrim. Antifungal coverage also added.      Interval History/Significant Events: transferred to MICU for respiratory distress and intubated    Review of Systems    Unable to perform ROS: Severe respiratory distress   Constitutional:        Limited due to resp. Distress  +SOB, cough. Denies sputum production, fevers, chills, chest pain.      Objective:     Vital Signs (Most Recent):  Temp: 97.9 °F (36.6 °C) (04/11/18 0850)  Pulse: (!) 120 (04/11/18 1253)  Resp: (!) 62 (04/11/18 1253)  BP: 127/66 (04/11/18 1131)  SpO2: 98 % (04/11/18 1253) Vital Signs (24h Range):  Temp:  [97.9 °F (36.6 °C)-99.4 °F (37.4 °C)] 97.9 °F (36.6 °C)  Pulse:  [] 120  Resp:  [20-62] 62  SpO2:  [80 %-100 %] 98 %  BP: (111-153)/(54-92) 127/66   Weight: 73.6 kg (162 lb 4.1 oz)  Body mass index is 27 kg/m².      Intake/Output Summary (Last 24 hours) at 04/11/18 1345  Last data filed at 04/11/18 0700   Gross per 24 hour   Intake              600 ml   Output                0 ml   Net              600 ml       Physical Exam   Constitutional: She appears well-developed. She has a sickly appearance. She appears ill. She appears distressed.   HENT:   Head: Normocephalic and atraumatic.   Eyes: Conjunctivae are normal. Pupils are equal, round, and reactive to light. Right eye exhibits no discharge. Left eye exhibits no discharge. No scleral icterus.   Neck: Neck supple. No JVD present. Thyromegaly present.   Cardiovascular: Regular rhythm, S1 normal and S2 normal.  Tachycardia present.    Pulses:       Radial pulses are 2+ on the right side, and 2+ on the left side.        Dorsalis pedis pulses are 2+ on the right side, and 2+ on the left side.   Warm extremities   Pulmonary/Chest: Accessory muscle usage present. Tachypnea noted. She is in respiratory distress. She has decreased breath sounds in the right lower field and the left lower field. She has no wheezes. She has rales in the right upper field, the right middle field, the left upper field and the left middle field.   Abdominal: Soft. Bowel sounds are normal. She exhibits no distension. There is no tenderness. There is no guarding.   Lymphadenopathy:      She has no cervical adenopathy.   Neurological: She is alert. GCS eye subscore is 4. GCS verbal subscore is 5. GCS motor subscore is 6.   Alert, oriented x4. Very anxious appearing as in respiratory distress. Non focal, answering all questions and moving extremities equally with good strength    Skin: She is diaphoretic. There is pallor.       Vents:  Vent Mode: A/C (04/11/18 1340)  Set Rate: 20 bmp (04/11/18 1340)  Vt Set: 350 mL (04/11/18 1340)  PEEP/CPAP: 5 cmH20 (04/11/18 1340)  Oxygen Concentration (%): 100 (04/11/18 1253)  Lines/Drains/Airways     Drain                 NG/OG Tube 04/11/18 1340 Left mouth less than 1 day          Airway                 Airway - Non-Surgical 04/11/18 1337 Endotracheal Tube less than 1 day          Peripheral Intravenous Line                 Peripheral IV - Single Lumen 04/11/18 1305 Left Antecubital less than 1 day         Peripheral IV - Single Lumen 04/11/18 1307 Right Antecubital less than 1 day              Significant Labs:    CBC/Anemia Profile:    Recent Labs  Lab 04/10/18  0352 04/11/18  0409   WBC 30.05* 30.08*   HGB 9.6* 9.5*   HCT 31.7* 30.5*   * 373*   MCV 77* 76*   RDW 15.5* 15.5*        Chemistries:    Recent Labs  Lab 04/10/18  0352 04/11/18  0409   * 130*   K 4.2 4.1   CL 97 95   CO2 25 24   BUN 15 11   CREATININE 0.9 0.8   CALCIUM 8.5* 8.7   ALBUMIN 2.0* 2.0*   PROT 6.2 6.4   BILITOT 0.6 0.8   ALKPHOS 117 118   ALT 43 28   AST 14 12   MG 1.5* 1.5*   PHOS 3.0 3.1         Significant Imaging:  I have reviewed and interpreted all pertinent imaging results/findings within the past 24 hours.    Assessment/Plan:     Neuro   Cerebrovascular accident (CVA)    --remote bilateral punctate CVA's  --continue home plavix        Encephalopathy, metabolic    --medication induced given need for sedation, opiates s/p intubation and need for synchrony with ventilator   --neurologically intact prior to intubation         Pulmonary   * Acute hypoxemic respiratory  failure    --suspect 2/2 severe bilateral pneumonia in the setting of immunosuppression.   --severely worsened over past week despite CAP treatment.   --s/p emergent intubation on 4/11 upon MICU admission for severe hypoxemia and respiratory distress.   --continue mechanical ventilation, wean support as tolerated  --plan for bronchoscopy with wash, cultures  --start Vancomycin, Zosyn, Bactrim, Posiconazole. We appreciate ID assistance with management  --follow up blood, sputum cultures.         Pneumonia of both upper lobes due to infectious organism    --see above        Cardiac/Vascular   Hypertension, essential    --Hold home medications in setting of shock         ID   Septic shock    --2/2 pneumonia, likely component of iatrogenic given amount of sedation need to keep synchronous with ventilator  --hold on 30 ml/kg bolus given degree of hypoxemia, and prior to intubation was not in shock  --start levophed and titrate to MAP >65  --Abx as above        GI   PUD (peptic ulcer disease)    --protonix        Orthopedic   Seropositive rheumatoid arthritis of multiple sites    --appreciate Rheumatology assistance  --plaquenil. Hold home prednisone. Add solumedrol         Other   Long term (current) use of systemic steroids    --as above             Critical Care Time: 70 minutes  Critical secondary to Patient has a condition that poses threat to life and bodily function: respiratory failure, shock      Critical care was time spent personally by me on the following activities: development of treatment plan with patient or surrogate and bedside caregivers, discussions with consultants, evaluation of patient's response to treatment, examination of patient, ordering and performing treatments and interventions, ordering and review of laboratory studies, ordering and review of radiographic studies, pulse oximetry, re-evaluation of patient's condition. This critical care time did not overlap with that of any other provider or  involve time for any procedures.     Carina Rodney, NP  Critical Care Medicine  [facility]

## 2018-04-12 ENCOUNTER — OUTPATIENT CASE MANAGEMENT (OUTPATIENT)
Dept: ADMINISTRATIVE | Facility: OTHER | Age: 83
End: 2018-04-12

## 2018-04-12 PROBLEM — J80 ARDS (ADULT RESPIRATORY DISTRESS SYNDROME): Status: ACTIVE | Noted: 2018-04-12

## 2018-04-12 PROBLEM — R73.9 HYPERGLYCEMIA: Status: ACTIVE | Noted: 2018-04-12

## 2018-04-12 LAB
ALBUMIN SERPL BCP-MCNC: 1.7 G/DL
ALLENS TEST: ABNORMAL
ALLENS TEST: NORMAL
ALLENS TEST: NORMAL
ALP SERPL-CCNC: 92 U/L
ALT SERPL W/O P-5'-P-CCNC: 20 U/L
ANION GAP SERPL CALC-SCNC: 10 MMOL/L
ANION GAP SERPL CALC-SCNC: 11 MMOL/L
ANION GAP SERPL CALC-SCNC: 8 MMOL/L
ANISOCYTOSIS BLD QL SMEAR: SLIGHT
AST SERPL-CCNC: 11 U/L
BASOPHILS # BLD AUTO: 0.02 K/UL
BASOPHILS NFR BLD: 0.1 %
BILIRUB DIRECT SERPL-MCNC: 0.4 MG/DL
BILIRUB SERPL-MCNC: 0.5 MG/DL
BUN SERPL-MCNC: 12 MG/DL
BUN SERPL-MCNC: 14 MG/DL
BUN SERPL-MCNC: 16 MG/DL
BURR CELLS BLD QL SMEAR: ABNORMAL
CALCIUM SERPL-MCNC: 8.1 MG/DL
CALCIUM SERPL-MCNC: 8.3 MG/DL
CALCIUM SERPL-MCNC: 8.5 MG/DL
CHLORIDE SERPL-SCNC: 101 MMOL/L
CHLORIDE SERPL-SCNC: 102 MMOL/L
CHLORIDE SERPL-SCNC: 99 MMOL/L
CO2 SERPL-SCNC: 23 MMOL/L
CO2 SERPL-SCNC: 24 MMOL/L
CO2 SERPL-SCNC: 25 MMOL/L
CREAT SERPL-MCNC: 1 MG/DL
CREAT SERPL-MCNC: 1.1 MG/DL
CREAT SERPL-MCNC: 1.2 MG/DL
CRYPTOC AG SER QL LA: NEGATIVE
DELSYS: ABNORMAL
DELSYS: NORMAL
DELSYS: NORMAL
DIFFERENTIAL METHOD: ABNORMAL
EOSINOPHIL # BLD AUTO: 0 K/UL
EOSINOPHIL NFR BLD: 0 %
ERYTHROCYTE [DISTWIDTH] IN BLOOD BY AUTOMATED COUNT: 15.5 %
ERYTHROCYTE [SEDIMENTATION RATE] IN BLOOD BY WESTERGREN METHOD: 23 MM/H
ERYTHROCYTE [SEDIMENTATION RATE] IN BLOOD BY WESTERGREN METHOD: 25 MM/H
EST. GFR  (AFRICAN AMERICAN): 49.3 ML/MIN/1.73 M^2
EST. GFR  (AFRICAN AMERICAN): 54.8 ML/MIN/1.73 M^2
EST. GFR  (AFRICAN AMERICAN): >60 ML/MIN/1.73 M^2
EST. GFR  (NON AFRICAN AMERICAN): 42.8 ML/MIN/1.73 M^2
EST. GFR  (NON AFRICAN AMERICAN): 47.5 ML/MIN/1.73 M^2
EST. GFR  (NON AFRICAN AMERICAN): 53.3 ML/MIN/1.73 M^2
FIO2: 40
FIO2: 50
FIO2: 50
FIO2: 70
GALACTOMANNAN AG SERPL IA-ACNC: <0.5 INDEX
GLUCOSE SERPL-MCNC: 156 MG/DL
GLUCOSE SERPL-MCNC: 185 MG/DL
GLUCOSE SERPL-MCNC: 317 MG/DL
HCO3 UR-SCNC: 24.5 MMOL/L (ref 24–28)
HCO3 UR-SCNC: 24.8 MMOL/L (ref 24–28)
HCO3 UR-SCNC: 25.6 MMOL/L (ref 24–28)
HCO3 UR-SCNC: 25.8 MMOL/L (ref 24–28)
HCO3 UR-SCNC: 26.1 MMOL/L (ref 24–28)
HCO3 UR-SCNC: 27.4 MMOL/L (ref 24–28)
HCT VFR BLD AUTO: 28.6 %
HGB BLD-MCNC: 8.6 G/DL
HYPOCHROMIA BLD QL SMEAR: ABNORMAL
IMM GRANULOCYTES # BLD AUTO: 0.23 K/UL
IMM GRANULOCYTES NFR BLD AUTO: 1 %
INR PPP: 1.3
LYMPHOCYTES # BLD AUTO: 0.9 K/UL
LYMPHOCYTES NFR BLD: 4 %
MAGNESIUM SERPL-MCNC: 1.9 MG/DL
MAGNESIUM SERPL-MCNC: 2.2 MG/DL
MCH RBC QN AUTO: 23.4 PG
MCHC RBC AUTO-ENTMCNC: 30.1 G/DL
MCV RBC AUTO: 78 FL
MIN VOL: 10.3
MIN VOL: 10.4
MIN VOL: 8.92
MODE: ABNORMAL
MODE: NORMAL
MODE: NORMAL
MONOCYTES # BLD AUTO: 1.4 K/UL
MONOCYTES NFR BLD: 6.1 %
NEUTROPHILS # BLD AUTO: 20.8 K/UL
NEUTROPHILS NFR BLD: 88.8 %
NRBC BLD-RTO: 0 /100 WBC
PCO2 BLDA: 40.9 MMHG (ref 35–45)
PCO2 BLDA: 43.6 MMHG (ref 35–45)
PCO2 BLDA: 44.4 MMHG (ref 35–45)
PCO2 BLDA: 44.8 MMHG (ref 35–45)
PCO2 BLDA: 49.3 MMHG (ref 35–45)
PCO2 BLDA: 56.6 MMHG (ref 35–45)
PEEP: 10
PEEP: 5
PEEP: 8
PH SMN: 7.29 [PH] (ref 7.35–7.45)
PH SMN: 7.33 [PH] (ref 7.35–7.45)
PH SMN: 7.35 [PH] (ref 7.35–7.45)
PH SMN: 7.36 [PH] (ref 7.35–7.45)
PH SMN: 7.38 [PH] (ref 7.35–7.45)
PH SMN: 7.41 [PH] (ref 7.35–7.45)
PHOSPHATE SERPL-MCNC: 4.3 MG/DL
PIP: 29
PIP: 30
PIP: 32
PLATELET # BLD AUTO: 385 K/UL
PLATELET BLD QL SMEAR: ABNORMAL
PMV BLD AUTO: 9.9 FL
PO2 BLDA: 106 MMHG (ref 80–100)
PO2 BLDA: 65 MMHG (ref 80–100)
PO2 BLDA: 65 MMHG (ref 80–100)
PO2 BLDA: 70 MMHG (ref 80–100)
PO2 BLDA: 91 MMHG (ref 80–100)
PO2 BLDA: 97 MMHG (ref 80–100)
POC BE: -1 MMOL/L
POC BE: -1 MMOL/L
POC BE: 0 MMOL/L
POC BE: 1 MMOL/L
POC SATURATED O2: 90 % (ref 95–100)
POC SATURATED O2: 91 % (ref 95–100)
POC SATURATED O2: 93 % (ref 95–100)
POC SATURATED O2: 97 % (ref 95–100)
POC SATURATED O2: 97 % (ref 95–100)
POC SATURATED O2: 98 % (ref 95–100)
POC TCO2: 26 MMOL/L (ref 23–27)
POC TCO2: 26 MMOL/L (ref 23–27)
POC TCO2: 27 MMOL/L (ref 23–27)
POC TCO2: 29 MMOL/L (ref 23–27)
POCT GLUCOSE: 155 MG/DL (ref 70–110)
POCT GLUCOSE: 161 MG/DL (ref 70–110)
POCT GLUCOSE: 166 MG/DL (ref 70–110)
POCT GLUCOSE: 167 MG/DL (ref 70–110)
POCT GLUCOSE: 167 MG/DL (ref 70–110)
POCT GLUCOSE: 170 MG/DL (ref 70–110)
POCT GLUCOSE: 173 MG/DL (ref 70–110)
POCT GLUCOSE: 178 MG/DL (ref 70–110)
POCT GLUCOSE: 198 MG/DL (ref 70–110)
POCT GLUCOSE: 204 MG/DL (ref 70–110)
POCT GLUCOSE: 224 MG/DL (ref 70–110)
POCT GLUCOSE: 263 MG/DL (ref 70–110)
POCT GLUCOSE: 266 MG/DL (ref 70–110)
POCT GLUCOSE: 285 MG/DL (ref 70–110)
POIKILOCYTOSIS BLD QL SMEAR: SLIGHT
POLYCHROMASIA BLD QL SMEAR: ABNORMAL
POTASSIUM SERPL-SCNC: 3.4 MMOL/L
POTASSIUM SERPL-SCNC: 3.7 MMOL/L
POTASSIUM SERPL-SCNC: 3.9 MMOL/L
PROT SERPL-MCNC: 5.9 G/DL
PROTHROMBIN TIME: 13.3 SEC
RBC # BLD AUTO: 3.68 M/UL
SAMPLE: ABNORMAL
SAMPLE: NORMAL
SAMPLE: NORMAL
SITE: ABNORMAL
SITE: NORMAL
SITE: NORMAL
SODIUM SERPL-SCNC: 133 MMOL/L
SODIUM SERPL-SCNC: 134 MMOL/L
SODIUM SERPL-SCNC: 136 MMOL/L
SP02: 92
SP02: 97
SP02: 97
VANCOMYCIN TROUGH SERPL-MCNC: 25.2 UG/ML
VT: 350
VT: 400
VT: 400
WBC # BLD AUTO: 23.38 K/UL

## 2018-04-12 PROCEDURE — 27000221 HC OXYGEN, UP TO 24 HOURS

## 2018-04-12 PROCEDURE — 25000242 PHARM REV CODE 250 ALT 637 W/ HCPCS: Performed by: INTERNAL MEDICINE

## 2018-04-12 PROCEDURE — 94003 VENT MGMT INPAT SUBQ DAY: CPT

## 2018-04-12 PROCEDURE — 80076 HEPATIC FUNCTION PANEL: CPT

## 2018-04-12 PROCEDURE — 85025 COMPLETE CBC W/AUTO DIFF WBC: CPT

## 2018-04-12 PROCEDURE — 25000003 PHARM REV CODE 250: Performed by: HOSPITALIST

## 2018-04-12 PROCEDURE — 94640 AIRWAY INHALATION TREATMENT: CPT

## 2018-04-12 PROCEDURE — 94761 N-INVAS EAR/PLS OXIMETRY MLT: CPT

## 2018-04-12 PROCEDURE — 25000003 PHARM REV CODE 250: Performed by: NURSE PRACTITIONER

## 2018-04-12 PROCEDURE — 25000003 PHARM REV CODE 250: Performed by: INTERNAL MEDICINE

## 2018-04-12 PROCEDURE — 99900026 HC AIRWAY MAINTENANCE (STAT)

## 2018-04-12 PROCEDURE — 99900035 HC TECH TIME PER 15 MIN (STAT)

## 2018-04-12 PROCEDURE — 99291 CRITICAL CARE FIRST HOUR: CPT | Mod: ,,, | Performed by: NURSE PRACTITIONER

## 2018-04-12 PROCEDURE — 97802 MEDICAL NUTRITION INDIV IN: CPT

## 2018-04-12 PROCEDURE — 80202 ASSAY OF VANCOMYCIN: CPT

## 2018-04-12 PROCEDURE — 84100 ASSAY OF PHOSPHORUS: CPT

## 2018-04-12 PROCEDURE — 37799 UNLISTED PX VASCULAR SURGERY: CPT

## 2018-04-12 PROCEDURE — 20000000 HC ICU ROOM

## 2018-04-12 PROCEDURE — 99233 SBSQ HOSP IP/OBS HIGH 50: CPT | Mod: ,,, | Performed by: INTERNAL MEDICINE

## 2018-04-12 PROCEDURE — 82803 BLOOD GASES ANY COMBINATION: CPT

## 2018-04-12 PROCEDURE — S0039 INJECTION, SULFAMETHOXAZOLE: HCPCS | Performed by: NURSE PRACTITIONER

## 2018-04-12 PROCEDURE — 83735 ASSAY OF MAGNESIUM: CPT

## 2018-04-12 PROCEDURE — 80048 BASIC METABOLIC PNL TOTAL CA: CPT | Mod: 91

## 2018-04-12 PROCEDURE — 63600175 PHARM REV CODE 636 W HCPCS: Performed by: INTERNAL MEDICINE

## 2018-04-12 PROCEDURE — 85610 PROTHROMBIN TIME: CPT

## 2018-04-12 PROCEDURE — 63600175 PHARM REV CODE 636 W HCPCS: Performed by: NURSE PRACTITIONER

## 2018-04-12 PROCEDURE — 83735 ASSAY OF MAGNESIUM: CPT | Mod: 91

## 2018-04-12 RX ORDER — HYDROXYCHLOROQUINE SULFATE 200 MG/1
400 TABLET, FILM COATED ORAL DAILY
Status: DISCONTINUED | OUTPATIENT
Start: 2018-04-13 | End: 2018-04-22

## 2018-04-12 RX ORDER — POTASSIUM CHLORIDE 20 MEQ/15ML
40 SOLUTION ORAL ONCE
Status: COMPLETED | OUTPATIENT
Start: 2018-04-12 | End: 2018-04-12

## 2018-04-12 RX ORDER — CLOPIDOGREL BISULFATE 75 MG/1
75 TABLET ORAL NIGHTLY
Status: DISCONTINUED | OUTPATIENT
Start: 2018-04-12 | End: 2018-04-22

## 2018-04-12 RX ADMIN — PROPOFOL 40 MCG/KG/MIN: 10 INJECTION, EMULSION INTRAVENOUS at 08:04

## 2018-04-12 RX ADMIN — PIPERACILLIN AND TAZOBACTAM 4.5 G: 4; .5 INJECTION, POWDER, LYOPHILIZED, FOR SOLUTION INTRAVENOUS; PARENTERAL at 05:04

## 2018-04-12 RX ADMIN — METHYLPREDNISOLONE SODIUM SUCCINATE 80 MG: 125 INJECTION, POWDER, FOR SOLUTION INTRAMUSCULAR; INTRAVENOUS at 08:04

## 2018-04-12 RX ADMIN — IPRATROPIUM BROMIDE AND ALBUTEROL SULFATE 3 ML: .5; 3 SOLUTION RESPIRATORY (INHALATION) at 03:04

## 2018-04-12 RX ADMIN — MINERAL OIL AND WHITE PETROLATUM: 150; 830 OINTMENT OPHTHALMIC at 05:04

## 2018-04-12 RX ADMIN — SULFAMETHOXAZOLE AND TRIMETHOPRIM 340 MG: 80; 16 INJECTION, SOLUTION, CONCENTRATE INTRAVENOUS at 12:04

## 2018-04-12 RX ADMIN — IPRATROPIUM BROMIDE AND ALBUTEROL SULFATE 3 ML: .5; 3 SOLUTION RESPIRATORY (INHALATION) at 07:04

## 2018-04-12 RX ADMIN — IPRATROPIUM BROMIDE AND ALBUTEROL SULFATE 3 ML: .5; 3 SOLUTION RESPIRATORY (INHALATION) at 11:04

## 2018-04-12 RX ADMIN — Medication 0.05 MCG/KG/MIN: at 10:04

## 2018-04-12 RX ADMIN — MINERAL OIL AND WHITE PETROLATUM: 150; 830 OINTMENT OPHTHALMIC at 01:04

## 2018-04-12 RX ADMIN — SULFAMETHOXAZOLE AND TRIMETHOPRIM 340 MG: 80; 16 INJECTION, SOLUTION, CONCENTRATE INTRAVENOUS at 04:04

## 2018-04-12 RX ADMIN — PROPOFOL 40 MCG/KG/MIN: 10 INJECTION, EMULSION INTRAVENOUS at 04:04

## 2018-04-12 RX ADMIN — ENOXAPARIN SODIUM 40 MG: 100 INJECTION SUBCUTANEOUS at 04:04

## 2018-04-12 RX ADMIN — POTASSIUM CHLORIDE 40 MEQ: 20 SOLUTION ORAL at 04:04

## 2018-04-12 RX ADMIN — CHLORHEXIDINE GLUCONATE 15 ML: 1.2 RINSE ORAL at 08:04

## 2018-04-12 RX ADMIN — POTASSIUM CHLORIDE 40 MEQ: 20 SOLUTION ORAL at 11:04

## 2018-04-12 RX ADMIN — VANCOMYCIN HYDROCHLORIDE 1 G: 10 INJECTION, POWDER, LYOPHILIZED, FOR SOLUTION INTRAVENOUS at 01:04

## 2018-04-12 RX ADMIN — PIPERACILLIN AND TAZOBACTAM 4.5 G: 4; .5 INJECTION, POWDER, LYOPHILIZED, FOR SOLUTION INTRAVENOUS; PARENTERAL at 01:04

## 2018-04-12 RX ADMIN — Medication 100 MCG/HR: at 09:04

## 2018-04-12 RX ADMIN — DEXTROSE 300 MG: 5 SOLUTION INTRAVENOUS at 08:04

## 2018-04-12 RX ADMIN — HYDROXYCHLOROQUINE SULFATE 400 MG: 200 TABLET, FILM COATED ORAL at 08:04

## 2018-04-12 RX ADMIN — PANTOPRAZOLE SODIUM 40 MG: 40 GRANULE, DELAYED RELEASE ORAL at 08:04

## 2018-04-12 RX ADMIN — MAGNESIUM SULFATE HEPTAHYDRATE 1 G: 500 INJECTION, SOLUTION INTRAMUSCULAR; INTRAVENOUS at 11:04

## 2018-04-12 RX ADMIN — SULFAMETHOXAZOLE AND TRIMETHOPRIM 340 MG: 80; 16 INJECTION, SOLUTION, CONCENTRATE INTRAVENOUS at 08:04

## 2018-04-12 RX ADMIN — PIPERACILLIN AND TAZOBACTAM 4.5 G: 4; .5 INJECTION, POWDER, LYOPHILIZED, FOR SOLUTION INTRAVENOUS; PARENTERAL at 10:04

## 2018-04-12 RX ADMIN — PROPOFOL 40 MCG/KG/MIN: 10 INJECTION, EMULSION INTRAVENOUS at 09:04

## 2018-04-12 RX ADMIN — PROPOFOL 40 MCG/KG/MIN: 10 INJECTION, EMULSION INTRAVENOUS at 01:04

## 2018-04-12 RX ADMIN — SODIUM CHLORIDE 2 UNITS/HR: 9 INJECTION, SOLUTION INTRAVENOUS at 10:04

## 2018-04-12 RX ADMIN — CLOPIDOGREL 75 MG: 75 TABLET, FILM COATED ORAL at 08:04

## 2018-04-12 RX ADMIN — VANCOMYCIN HYDROCHLORIDE 1 G: 10 INJECTION, POWDER, LYOPHILIZED, FOR SOLUTION INTRAVENOUS at 02:04

## 2018-04-12 RX ADMIN — CISATRACURIUM BESYLATE 1 MCG/KG/MIN: 10 INJECTION INTRAVENOUS at 08:04

## 2018-04-12 NOTE — CONSULTS
"  [facility]  Adult Nutrition  Consult Note    SUMMARY     Recommendations    Recommendation/Intervention:   1. As medically able, recommend increasing TF goal rate to meet pt's needs. Glucerna 1.5 @ 35mL/hr.   - Provides 1260kcals, 69g protein and 640mL free water.   - Hold for residuals >500mL.     2. If able to extubate and advance diet as able to Regular with texture per SLP recommendations.     RD to monitor.    Goals: Pt to receive >85% EEN and EPN  Nutrition Goal Status: new  Communication of RD Recs: reviewed with RN    Reason for Assessment    Reason for Assessment: consult  Diagnosis: other (see comments) (respiratory failure)  Relevant Medical History: RA, anemia, arthritis, HTN, stroke, hasimoto's  Interdisciplinary Rounds: did not attend  General Information Comments: Pt intubated and sedated. Nimbex turned off this morning and TF trickle feeds ordered.   Nutrition Discharge Planning: unable to determine at this time    Nutrition Risk Screen    Nutrition Risk Screen: no indicators present    Nutrition/Diet History    Typical Food/Fluid Intake: Pt NPO on mech vent. Pt's dtgr at bedside reports she believes pt was tolerating adequate po intake prior to rapid response.  Food Preferences: No Anabaptism/cultural food preferences at this time  Do you have any cultural, spiritual, Anabaptism conflicts, given your current situation?: none stated  Factors Affecting Nutritional Intake: on mechanical ventilation, NPO    Anthropometrics    Temp: 96.9 °F (36.1 °C)  Height Method: Stated  Height: 5' 5" (165.1 cm)  Height (inches): 65 in  Weight Method: Bed Scale  Weight: 68.4 kg (150 lb 12.7 oz)  Weight (lb): 150.8 lb  Ideal Body Weight (IBW), Female: 125 lb  % Ideal Body Weight, Female (lb): 129.81 lb  BMI (Calculated): 27.1  BMI Grade: 25 - 29.9 - overweight       Lab/Procedures/Meds    Pertinent Labs Reviewed: reviewed  Pertinent Labs Comments: Na 133, POCT Glu   Pertinent Medications " Reviewed: reviewed  Pertinent Medications Comments: lasix, propofol, norepinephrine    Physical Findings/Assessment    Overall Physical Appearance: nourished, overweight, on ventilator support  Tubes: orogastric tube  Skin: intact    Estimated/Assessed Needs    Weight Used For Calorie Calculations: 68.4 kg (150 lb 12.7 oz)  Energy Calorie Requirements (kcal): 1370  Energy Need Method: [facility]  Protein Requirements: 82-103g (1.2-1.5g/kg)  Weight Used For Protein Calculations: 68.4 kg (150 lb 12.7 oz)  Fluid Requirements (mL): 1mL/kcal or per MD     RDA Method (mL): 1370         Nutrition Prescription Ordered    Current Diet Order: NPO  Nutrition Order Comments: TF to be started  Current Nutrition Support Formula Ordered: Glucerna 1.5  Current Nutrition Support Rate Ordered: 10 (ml)  Current Nutrition Support Frequency Ordered: mL/hr    Evaluation of Received Nutrient/Fluid Intake    Enteral Calories (kcal): 360  Enteral Protein (gm): 20  Enteral (Free Water) Fluid (mL): 182  Lipid Calories (kcals): 430 kcals (propofol @ 16.3mL/hr)  Total Calories (kcal): 790    % Kcal Needs: 58  % Protein Needs: 24    I/O: -I/O, good UOP, LBM 4/11    Energy Calories Required: not meeting needs  Protein Required: not meeting needs  Fluid Required: other (see comments) (per MD)    % Intake of Estimated Energy Needs: 0 - 25 %  % Meal Intake: NPO    Nutrition Risk    Level of Risk/Frequency of Follow-up:  (f/u 2x/week)     Assessment and Plan    Nutrition Problem  Inadequate energy intake    Related to (etiology):   TF provision    Signs and Symptoms (as evidenced by):   Pt receiving <85% EEN and EPN.     Interventions/Recommendations (treatment strategy):  Please see RD recs above.    Nutrition Diagnosis Status:   New      Monitor and Evaluation    Food and Nutrient Intake: energy intake, food and beverage intake, enteral nutrition intake  Food and Nutrient Adminstration: diet order, enteral and parenteral nutrition  administration  Anthropometric Measurements: weight, weight change, body mass index  Biochemical Data, Medical Tests and Procedures: electrolyte and renal panel, gastrointestinal profile, glucose/endocrine profile, inflammatory profile, lipid profile  Nutrition-Focused Physical Findings: overall appearance     Nutrition Follow-Up    RD Follow-up?: Yes

## 2018-04-12 NOTE — PLAN OF CARE
Problem: Patient Care Overview  Goal: Plan of Care Review  Outcome: Ongoing (interventions implemented as appropriate)  See vital signs and assessments in flowsheets. See below for updates on today's progress.     Pulmonary: AC rate 25/ 350 Tv/ 50%Fio2/ 8 peep, O2 sat 91-93%, MD aware.     Cardiovascular: SR 60-80's. BP systolic 's on cuff. A line 90/40's. Titrated levo by coff pressure per MD order, nursing communication in place.     Neurological: unresponsive. Nimbex turned off around 10 Am. TOF 0/4 at 4 (baseline). BIS 40-60's. Afebrile     Gastrointestinal: TF started at 10. OG in place at 55. No BM    Genitourinary: UOP 30-100ml/ hr    Endocrine: insulin drip started. See point of care and MAR to see glucose and insulin titration.     Integumentary/Other: replaced K and mag on shift     Infusions: prop, fent, levo, insulin.     Patient progressing towards goals as tolerated, plan of care communicated and reviewed with patient and family. All concerns addressed. Will continue to monitor.

## 2018-04-12 NOTE — PLAN OF CARE
Problem: Patient Care Overview  Goal: Plan of Care Review  Outcome: Ongoing (interventions implemented as appropriate)  NAEON. Pt syncronized with vent throughout night. See flowsheets for vital signs and assessments. See below for updates on progress made.       Neuro: paralyzed; BIS low to mid 40s; TOF 4/4 at 4; RASS -5, pinpoint pupils    Cardiovascular: SR 70s-80s; CUFF BP 100s/50s with MAPs 70s, levo gtt titrated using CUFF BP per order      Pulmonary: vent: AC, rate 23, , FiO2 70%, PEEP weaned from 12 to 5    Gastrointestinal: OGT at 55cm, no BM    Genitourinary: voided 1775 mL per parikh    Endocrine: K 3.7 and mag 1.9, CCS notified, awaiting replacement orders    Infusions: levo weaned from 0.2 to 0.04, propofol at 40, fentanyl at 100, nimbex at 1    POC: wean sedation    Patient progressing towards goals as tolerated, plan of care communicated and reviewed with Dr. Selma Lux and daughter Susu. Will continue to monitor.

## 2018-04-12 NOTE — SUBJECTIVE & OBJECTIVE
Interval History: Weaning from vent support slowly, no acute events. Afebrile. BAL cultures NGTD and cytology/GMS pending.    Review of Systems   Unable to perform ROS: Intubated     Objective:     Vital Signs (Most Recent):  Temp: 97.6 °F (36.4 °C) (04/12/18 1500)  Pulse: 77 (04/12/18 1645)  Resp: (!) 25 (04/12/18 1645)  BP: (!) 100/57 (04/12/18 1645)  SpO2: (!) 92 % (04/12/18 1645) Vital Signs (24h Range):  Temp:  [96.8 °F (36 °C)-98.6 °F (37 °C)] 97.6 °F (36.4 °C)  Pulse:  [68-95] 77  Resp:  [0-34] 25  SpO2:  [91 %-99 %] 92 %  BP: ()/(50-63) 100/57  Arterial Line BP: ()/(38-48) 96/42     Weight: 68.4 kg (150 lb 12.7 oz)  Body mass index is 25.09 kg/m².    Estimated Creatinine Clearance: 39.7 mL/min (based on SCr of 1.1 mg/dL).    Physical Exam   Constitutional: She appears well-developed. No distress.   Intubated/sedated.   HENT:   Head: Atraumatic.   Mouth/Throat: Oropharynx is clear and moist. No oropharyngeal exudate.   Eyes: Conjunctivae and EOM are normal. Pupils are equal, round, and reactive to light. No scleral icterus.   Neck: Neck supple.   Cardiovascular: Normal rate and regular rhythm.  Exam reveals no friction rub.    No murmur heard.  Pulmonary/Chest: No respiratory distress. She has no wheezes. She has rales. She exhibits no tenderness.   On vent, 50% FiO2.   Abdominal: Soft. Bowel sounds are normal. She exhibits no distension. There is no tenderness. There is no rebound and no guarding.   Musculoskeletal: Normal range of motion. She exhibits no edema.   Lymphadenopathy:     She has no cervical adenopathy.   Neurological:   Intubated/sedated.   Skin: No rash noted. No erythema.       Significant Labs:   CBC:   Recent Labs  Lab 04/11/18  0409 04/11/18  1425 04/12/18 0318   WBC 30.08* 35.14* 23.38*   HGB 9.5* 9.4* 8.6*   HCT 30.5* 29.6* 28.6*   * 352* 385*     CMP:   Recent Labs  Lab 04/11/18  0409  04/11/18  2030 04/12/18 0318 04/12/18  1446   *  < > 131* 133* 134*   K 4.1   < > 3.8 3.7 3.4*   CL 95  < > 98 99 101   CO2 24  < > 23 23 25   GLU 86  < > 239* 317* 185*   BUN 11  < > 11 12 14   CREATININE 0.8  < > 1.0 1.0 1.1   CALCIUM 8.7  < > 8.3* 8.1* 8.3*   PROT 6.4  --   --  5.9*  --    ALBUMIN 2.0*  --   --  1.7*  --    BILITOT 0.8  --   --  0.5  --    ALKPHOS 118  --   --  92  --    AST 12  --   --  11  --    ALT 28  --   --  20  --    ANIONGAP 11  < > 10 11 8   EGFRNONAA >60.0  < > 53.3* 53.3* 47.5*   < > = values in this interval not displayed.    Significant Imaging: I have reviewed all pertinent imaging results/findings within the past 24 hours.     CT chest:  1. Progression of bilateral patchy and confluent pulmonary consolidation compatible with pneumonia less likely edema or noninfectious inflammation.  There is air noted throughout the esophagus.  2. Stable markedly enlarged right thyroid lobe with large hypodense nodule measuring 3.2 x 2.4 x 4.7 cm.  3. Right renal cyst.  4. Enlarged lobular uterus with calcifications compatible with fibroids.  5. Additional findings as above.     Microbiology:  4/5 blood cx negative  4/5 urine cx negative  4/10 C.diff negative  4/11 blood cx negative  4/11 BAL cx NGTD, path pending

## 2018-04-12 NOTE — PROGRESS NOTES
[facility]  Infectious Disease  Progress Note    Patient Name: Selma Rosado MD Jose J  MRN: [id number]  Admission Date: 4/5/2018  Length of Stay: 7 days  Attending Physician: Patricia Kirkpatrick MD  Primary Care Provider: Bhargav Hirsch MD    Isolation Status: No active isolations  Assessment/Plan:      * Acute hypoxemic respiratory failure    81yo woman w/a history of prior CVA, RA (dx 2012; RF seropositive, erosive; on HCQ/prednisone 5-10mg and recently started humira first dose 3/22/2018), IBS, and Hasimotos thyroiditis (goiter) who was admitted on 4/5/2018 with 2 days of dry cough and progressive SOB due to multifocal PNA (with peripheral and basilar lesions noted on CT chest) that has progressively worsened despite CAP coverage x5 days, requiring intubation on 4/12 for hypoxic RF. She remains critically ill but has plateaued clinically on expanded coverage for all possibilities on differential of hypoxic RF (most notable for MDRO bacterial HCAP/HAP, IFI including Aspergillus and endemic fungi with PCP less likely given steroid dose, and  vs hypersensitivity pneumonitis).     - will continue coverage for HCAP with vanc/zosyn -- she has completed 5 days of atypical coverage with azithromycin already  - will continue posaconazole for coverage of the above mold species and await fungal markers  - agree with empiric PCP treatment with bactrim/steroids although not classic for PCP -- can tailor if needed when GMS if back  - await BAL cultures, cytology with GMS, and cytology  - will tailor therapy with you as she improves            Anticipated Disposition: pending improvement    Thank you for your consult. I will follow-up with patient. Please contact us if you have any additional questions.     Carol Tan MD  Transplant ID Attending  [phone]    Carol Tan MD  Infectious Disease  [facility]    Subjective:     Principal Problem:Acute hypoxemic respiratory  failure    HPI: No notes on file  Interval History: Weaning from vent support slowly, no acute events. Afebrile. BAL cultures NGTD and cytology/GMS pending.    Review of Systems   Unable to perform ROS: Intubated     Objective:     Vital Signs (Most Recent):  Temp: 97.6 °F (36.4 °C) (04/12/18 1500)  Pulse: 77 (04/12/18 1645)  Resp: (!) 25 (04/12/18 1645)  BP: (!) 100/57 (04/12/18 1645)  SpO2: (!) 92 % (04/12/18 1645) Vital Signs (24h Range):  Temp:  [96.8 °F (36 °C)-98.6 °F (37 °C)] 97.6 °F (36.4 °C)  Pulse:  [68-95] 77  Resp:  [0-34] 25  SpO2:  [91 %-99 %] 92 %  BP: ()/(50-63) 100/57  Arterial Line BP: ()/(38-48) 96/42     Weight: 68.4 kg (150 lb 12.7 oz)  Body mass index is 25.09 kg/m².    Estimated Creatinine Clearance: 39.7 mL/min (based on SCr of 1.1 mg/dL).    Physical Exam   Constitutional: She appears well-developed. No distress.   Intubated/sedated.   HENT:   Head: Atraumatic.   Mouth/Throat: Oropharynx is clear and moist. No oropharyngeal exudate.   Eyes: Conjunctivae and EOM are normal. Pupils are equal, round, and reactive to light. No scleral icterus.   Neck: Neck supple.   Cardiovascular: Normal rate and regular rhythm.  Exam reveals no friction rub.    No murmur heard.  Pulmonary/Chest: No respiratory distress. She has no wheezes. She has rales. She exhibits no tenderness.   On vent, 50% FiO2.   Abdominal: Soft. Bowel sounds are normal. She exhibits no distension. There is no tenderness. There is no rebound and no guarding.   Musculoskeletal: Normal range of motion. She exhibits no edema.   Lymphadenopathy:     She has no cervical adenopathy.   Neurological:   Intubated/sedated.   Skin: No rash noted. No erythema.       Significant Labs:   CBC:   Recent Labs  Lab 04/11/18  0409 04/11/18  1425 04/12/18 0318   WBC 30.08* 35.14* 23.38*   HGB 9.5* 9.4* 8.6*   HCT 30.5* 29.6* 28.6*   * 352* 385*     CMP:   Recent Labs  Lab 04/11/18  0409  04/11/18  2030 04/12/18  0318 04/12/18  1446    *  < > 131* 133* 134*   K 4.1  < > 3.8 3.7 3.4*   CL 95  < > 98 99 101   CO2 24  < > 23 23 25   GLU 86  < > 239* 317* 185*   BUN 11  < > 11 12 14   CREATININE 0.8  < > 1.0 1.0 1.1   CALCIUM 8.7  < > 8.3* 8.1* 8.3*   PROT 6.4  --   --  5.9*  --    ALBUMIN 2.0*  --   --  1.7*  --    BILITOT 0.8  --   --  0.5  --    ALKPHOS 118  --   --  92  --    AST 12  --   --  11  --    ALT 28  --   --  20  --    ANIONGAP 11  < > 10 11 8   EGFRNONAA >60.0  < > 53.3* 53.3* 47.5*   < > = values in this interval not displayed.    Significant Imaging: I have reviewed all pertinent imaging results/findings within the past 24 hours.     CT chest:  1. Progression of bilateral patchy and confluent pulmonary consolidation compatible with pneumonia less likely edema or noninfectious inflammation.  There is air noted throughout the esophagus.  2. Stable markedly enlarged right thyroid lobe with large hypodense nodule measuring 3.2 x 2.4 x 4.7 cm.  3. Right renal cyst.  4. Enlarged lobular uterus with calcifications compatible with fibroids.  5. Additional findings as above.     Microbiology:  4/5 blood cx negative  4/5 urine cx negative  4/10 C.diff negative  4/11 blood cx negative  4/11 BAL cx NGTD, path pending

## 2018-04-12 NOTE — ASSESSMENT & PLAN NOTE
--suspect 2/2 severe bilateral pneumonia in the setting of immunosuppression.   --severely worsened over past week despite CAP treatment.   --s/p emergent intubation on 4/11 upon MICU admission for severe hypoxemia and respiratory distress.   --continue mechanical ventilation, oxygenation improved, wean support as tolerated  --bronchoscopy on 4/11 with wash and cultures pending  --start Vancomycin, Zosyn, Bactrim, Posiconazole. We appreciate ID assistance with management  --blood, sputum, urine cultures negative.   --AFB, fungal pending  --KOH prep negative, no acid fast bacilli noted  --continue steroids  --CXR today

## 2018-04-12 NOTE — PLAN OF CARE
Problem: Nutrition, Enteral (Adult)  Goal: Signs and Symptoms of Listed Potential Problems Will be Absent, Minimized or Managed (Nutrition, Enteral)  Signs and symptoms of listed potential problems will be absent, minimized or managed by discharge/transition of care (reference Nutrition, Enteral (Adult) CPG).  Outcome: Ongoing (interventions implemented as appropriate)  Nutrition assessment completed. Please see RD note for details.    Recommendation/Intervention:   1. As medically able, recommend increasing TF goal rate to meet pt's needs. Glucerna 1.5 @ 35mL/hr.   - Provides 1260kcals, 69g protein and 640mL free water.   - Hold for residuals >500mL.     2. If able to extubate and advance diet as able to Regular with texture per SLP recommendations.     RD to monitor.    Goals: Pt to receive >85% EEN and EPN  Nutrition Goal Status: new  Communication of RD Recs: reviewed with RN

## 2018-04-12 NOTE — PT/OT/SLP DISCHARGE
Occupational Therapy Discharge Summary    Selma Alonzo Lux MD  MRN: [id number]   Principal Problem: Acute hypoxemic respiratory failure      Patient Discharged from acute Occupational Therapy on 4/11/18.  Please refer to prior OT note dated 4/9/18 for functional status.    Assessment:      Patient transferred to lower level of care secondary to decline in medical status    Objective:     GOALS:    Occupational Therapy Goals        Problem: Occupational Therapy Goal    Goal Priority Disciplines Outcome Interventions   Occupational Therapy Goal     OT, PT/OT Ongoing (interventions implemented as appropriate)    Description:  Goals to be met by: 4/30/18     Patient will increase functional independence with ADLs by performing:    UE Dressing with Supervision.  LE Dressing with Stand-by Assistance.  Grooming while standing at sink with Stand-by Assistance.  Toileting from toilet with Supervision for hygiene and clothing management.   Supine to sit with Modified [place].  Stand pivot transfers with Stand-by Assistance.                      Reasons for Discontinuation of Therapy Services  Transfer to alternate level of care.      Plan:     Patient Discharged to: critical care for higher level of care; re-consult therapy when medically appropriate.    AYAD Montana  4/12/2018  Pager: [phone]

## 2018-04-12 NOTE — PROGRESS NOTES
Notified NP Nevarez of patient O2 sat 90-91%. Trended down from 93-94%. Patient in sync with the vent. No new orders at present time. Will continue to monitor.

## 2018-04-12 NOTE — NURSING
Soft wrist restraints removed at 1906 d/t pt being unresponsive bc she is paralyzed and sedated. Order d/c as well. WCTM.

## 2018-04-12 NOTE — PLAN OF CARE
Problem: Physical Therapy Goal  Goal: Physical Therapy Goal  Goals to be met by: 18     Patient will increase functional independence with mobility by performin. Supine to sit with Stand-by Assistance  2. Sit to supine with Stand-by Assistance  3. Sit to stand transfer with Stand-by Assistance  4. Bed to chair transfer with Stand-by Assistance using LRAD  5. Gait  x 50 feet with Contact Guard Assistance using LRAD    6. Ascend/descend 5 stair with bilateral Handrails Contact Guard Assistance using Single-point Cane .   7. Lower extremity exercise program x20 reps per handout, with supervision     Outcome: Outcome(s) achieved Date Met: 18  PT discharged from PT services due to transfer to ICU. Pt currently intubated.    Candi Ram, PT, DPT  2018

## 2018-04-12 NOTE — ASSESSMENT & PLAN NOTE
83yo woman w/a history of prior CVA, RA (dx 2012; RF seropositive, erosive; on HCQ/prednisone 5-10mg and recently started humira first dose 3/22/2018), IBS, and [place] thyroiditis (goiter) who was admitted on 4/5/2018 with 2 days of dry cough and progressive SOB due to multifocal PNA (with peripheral and basilar lesions noted on CT chest) that has progressively worsened despite CAP coverage x5 days, requiring intubation on 4/12 for hypoxic RF. She remains critically ill but has plateaued clinically on expanded coverage for all possibilities on differential of hypoxic RF (most notable for MDRO bacterial HCAP/HAP, IFI including Aspergillus and endemic fungi with PCP less likely given steroid dose, and  vs hypersensitivity pneumonitis).     - will continue coverage for HCAP with vanc/zosyn -- she has completed 5 days of atypical coverage with azithromycin already  - will continue posaconazole for coverage of the above mold species and await fungal markers  - agree with empiric PCP treatment with bactrim/steroids although not classic for PCP -- can tailor if needed when GMS if back  - await BAL cultures, cytology with GMS, and cytology  - will tailor therapy with you as she improves

## 2018-04-12 NOTE — PT/OT/SLP DISCHARGE
Physical Therapy Discharge Summary    Name: Selma Alonzo Lux MD  MRN: [id number]   Principal Problem: Acute hypoxemic respiratory failure     Patient Discharged from acute Physical Therapy on 2018.  Please refer to prior PT noted date on 2018 for functional status.     Assessment:     Patient transferred to lower level of care secondary to req higher level of care    Objective:     GOALS:    Physical Therapy Goals     Not on file          Multidisciplinary Problems (Resolved)        Problem: Physical Therapy Goal    Goal Priority Disciplines Outcome Goal Variances Interventions   Physical Therapy Goal   (Resolved)     PT/OT, PT Outcome(s) achieved     Description:  Goals to be met by: 18     Patient will increase functional independence with mobility by performin. Supine to sit with Stand-by Assistance  2. Sit to supine with Stand-by Assistance  3. Sit to stand transfer with Stand-by Assistance  4. Bed to chair transfer with Stand-by Assistance using LRAD  5. Gait  x 50 feet with Contact Guard Assistance using LRAD    6. Ascend/descend 5 stair with bilateral Handrails Contact Guard Assistance using Single-point Cane .   7. Lower extremity exercise program x20 reps per handout, with supervision                      Reasons for Discontinuation of Therapy Services  Patient is unable to continue work toward goals because of medical or psychosocial complications.      Plan:     Patient Discharged to: Transferred to Cardiac ICU.    Candi Ram, PT, DPT  2018

## 2018-04-12 NOTE — PROGRESS NOTES
"4/12/18  Email communication received by Aracely Quintana LCSW and this CM, this AM from daughter, Susu Lux. Patient, Dr. Selma Lux reported to be Inpt [facility]. Susu emailed attached  MPOA and Living Will documents. Pt, formerly enrolled in Outpatient Care Management. This CM, scanned both documents [facility] filing them in "". Susu was made aware.  Notation of these added forms into pt's chart were stated in [employer] [facility] Comments.   Domonique Baez RN, CCM  "

## 2018-04-12 NOTE — SUBJECTIVE & OBJECTIVE
Interval History/Significant Events: no acute events overnight    Review of Systems   Unable to perform ROS: Intubated     Objective:     Vital Signs (Most Recent):  Temp: 96.9 °F (36.1 °C) (04/12/18 0701)  Pulse: 74 (04/12/18 0800)  Resp: (!) 23 (04/12/18 0800)  BP: (!) 101/55 (04/12/18 0800)  SpO2: 95 % (04/12/18 0800) Vital Signs (24h Range):  Temp:  [96.9 °F (36.1 °C)-98.6 °F (37 °C)] 96.9 °F (36.1 °C)  Pulse:  [] 74  Resp:  [0-45] 23  SpO2:  [80 %-100 %] 95 %  BP: ()/(44-93) 101/55  Arterial Line BP: ()/(39-53) 95/40   Weight: 68.4 kg (150 lb 12.7 oz)  Body mass index is 25.09 kg/m².      Intake/Output Summary (Last 24 hours) at 04/12/18 0841  Last data filed at 04/12/18 0803   Gross per 24 hour   Intake          3646.17 ml   Output             3520 ml   Net           126.17 ml       Physical Exam   Constitutional: She does not have a sickly appearance. She is sedated, chemically paralyzed and intubated.   HENT:   Head: Normocephalic and atraumatic.   Eyes: Conjunctivae are normal. Right eye exhibits no exudate. Left eye exhibits no exudate. Right eye exhibits nystagmus. Left eye exhibits nystagmus.   Pupils pinpoint and equal, unable to appreciate reactivity   Neck: Neck rigidity present. No tracheal deviation present.   Large palpable goiter present   Cardiovascular: Normal rate, regular rhythm and normal heart sounds.  PMI is not displaced.  Exam reveals no gallop and no friction rub.    No murmur heard.  Pulses:       Carotid pulses are 2+ on the right side, and 2+ on the left side.       Radial pulses are 2+ on the right side, and 2+ on the left side.        Dorsalis pedis pulses are 2+ on the right side, and 2+ on the left side.   Pulmonary/Chest: Effort normal. No accessory muscle usage. Tachypnea noted. She is intubated. No respiratory distress. She has rales in the right middle field, the right lower field, the left middle field and the left lower field.   Abdominal: Soft. Normal  appearance. Bowel sounds are absent.   Genitourinary:   Genitourinary Comments: Hill catheter draining clear, yellow urine   Musculoskeletal: Normal range of motion.   Lymphadenopathy:     She has no cervical adenopathy.   Neurological: She is unresponsive. GCS eye subscore is 1. GCS verbal subscore is 1. GCS motor subscore is 1.   Patient paralyzed   Skin: Skin is warm, dry and intact. She is not diaphoretic.   Unable to assess capillary refill due to nail polish on all nails   Nursing note and vitals reviewed.      Vents:  Vent Mode: A/C (04/12/18 0727)  Set Rate: 23 bmp (04/12/18 0727)  Vt Set: 400 mL (04/12/18 0727)  Pressure Support: 0 cmH20 (04/12/18 0727)  PEEP/CPAP: 5 cmH20 (04/12/18 0727)  Oxygen Concentration (%): 60 (04/12/18 0800)  Peak Airway Pressure: 25 cmH2O (04/12/18 0727)  Plateau Pressure: 0 cmH20 (04/12/18 0727)  Total Ve: 9.49 mL (04/12/18 0727)  F/VT Ratio<105 (RSBI): (!) 55.69 (04/12/18 0727)  Lines/Drains/Airways     Central Venous Catheter Line                 Percutaneous Central Line Insertion/Assessment - triple lumen  04/11/18 1513 right subclavian less than 1 day          Drain                 NG/OG Tube 04/11/18 1340 Left mouth less than 1 day         Urethral Catheter 04/11/18 1430 Double-lumen 16 Fr. less than 1 day          Airway                 Airway - Non-Surgical 04/11/18 1337 Endotracheal Tube less than 1 day          Arterial Line                 Arterial Line 04/11/18 1540 Right Radial less than 1 day          Peripheral Intravenous Line                 Peripheral IV - Single Lumen 04/11/18 1305 Left Antecubital less than 1 day         Peripheral IV - Single Lumen 04/11/18 1307 Right Antecubital less than 1 day              Significant Labs:    CBC/Anemia Profile:    Recent Labs  Lab 04/11/18  0409 04/11/18  1425 04/12/18  0318   WBC 30.08* 35.14* 23.38*   HGB 9.5* 9.4* 8.6*   HCT 30.5* 29.6* 28.6*   * 352* 385*   MCV 76* 77* 78*   RDW 15.5* 16.4* 15.5*         Chemistries:    Recent Labs  Lab 04/11/18  0409 04/11/18  1425 04/11/18  2030 04/12/18  0318   * 129* 131* 133*   K 4.1 3.8 3.8 3.7   CL 95 96 98 99   CO2 24 25 23 23   BUN 11 9 11 12   CREATININE 0.8 0.8 1.0 1.0   CALCIUM 8.7 8.2* 8.3* 8.1*   ALBUMIN 2.0*  --   --  1.7*   PROT 6.4  --   --  5.9*   BILITOT 0.8  --   --  0.5   ALKPHOS 118  --   --  92   ALT 28  --   --  20   AST 12  --   --  11   MG 1.5*  --  2.3 1.9   PHOS 3.1  --   --  4.3       All pertinent labs within the past 24 hours have been reviewed.    Significant Imaging:  I have reviewed all pertinent imaging results/findings within the past 24 hours.

## 2018-04-12 NOTE — PROCEDURES
[facility]   Procedure Note- 4/11/2018                   Procedure- Bronchoscopy with BAL   Indication-  Hypoxemic respiratory failure, abnormal CXR, immunocompromised.     Staff- Casimiro Carballo- Mariya   Location- MICU      Informed consent-- Patients sister consented at bedside. She was explained procedure and indications in detail. I discussed potential complications including infection, PTX, bleeding, worsening respiratory failure and additional numerous potential risks as outlined in the written informed consent. She verbalized understanding and in agreement to proceed. Written consent signed and on chart.      Procedure in Detail-- Patient currently sedated and intubated in MICU. 2cc 2% lidocaine instilled via ET tube. Patient preoxygenated prior to initiation. Bronchoscope inserted via ET tube and airway inspection completed. No significant secretions/bleeding.  Airway notable for only a few areas of erythema. Priyanka sharp and no endobronchial lesions identified. BAL of RLL performed with return of yellow tinged fluid and no evidence of DAH.    BAL In- 180cc   Out- 60cc       Complications- No immediate complications, tolerated procedure well      Studies:     -- Resp culture and stain  -- Expanded RVP   -- Cell count and differential   -- Cytology with GMS for PJP   -- Fungal/AFB cultures   -- BAl Aspergillus         Nathaniel Meraz M.D.   Pulmonary/Critical Care Fellow- PGY VIII   [phone]

## 2018-04-12 NOTE — PROGRESS NOTES
[facility]  Critical Care Medicine  Progress Note    Patient Name: Selma Rosado MD Jose J  MRN: [id number]  Admission Date: 4/5/2018  Hospital Length of Stay: 7 days  Code Status: Full Code  Attending Provider: Patricia Kirkpatrick MD  Primary Care Provider: Bhargav Hirsch MD   Principal Problem: Acute hypoxemic respiratory failure    Subjective:     HPI:  Mrs. Lux is a 81 yo female with history significant for CVA, RA on plaquenil/prednisone and recently started on Humiria (3/22), HTN, and large goiter who originally presented to [facility] ED on 4/5 with complaints of continually worsening shortness of breath, cough, and fatigue that started about 2 weeks prior. She was seen by her PCP on 4/2 for these complaints and received a prescription for duo nebs and tessalon perles. CXR performed on 4/2 noted bilateral basilar infiltrates. At that time, she denied fever, chills, sputum production, sick contacts. She was admitted to  and treated emprically for CAP with rocephin and azithromycin. She was also diuresed daily. However, her oxygen requirements slowly increased from 2 L NC to 4 L NC with also progressively worsening bilateral infiltrates on imaging. CTA performed on 4/9 negative for PE, however noted significant bilateral consolidations. On 4/11, her hypoxemia continued to worsen, requiring NRB mask, and she developed severe respiratory distress. She was transferred to the MICU and emergently intubated. Repeat CT chest performed on 4/11 demonstrated progressive worsening of bilateral peripheral infiltrates.    Hospital/ICU Course:  Admitted to MICU on 4/11, intubated emergently for severe hypoxemia and respiratory distress. ID was consulted for assistance in management given immunocompromised state. Patient paralyzed with nimbex following persistent dysynchrony with vent despite sedation. Abx broadened to Vanco, Zosyn, Bactrim, and posaconazole.Levophed initiated for BP support. Bronchoscopy  performed at bedside, culture results pending.      Interval History/Significant Events: no acute events overnight    Review of Systems   Unable to perform ROS: Intubated     Objective:     Vital Signs (Most Recent):  Temp: 96.9 °F (36.1 °C) (04/12/18 0701)  Pulse: 74 (04/12/18 0800)  Resp: (!) 23 (04/12/18 0800)  BP: (!) 101/55 (04/12/18 0800)  SpO2: 95 % (04/12/18 0800) Vital Signs (24h Range):  Temp:  [96.9 °F (36.1 °C)-98.6 °F (37 °C)] 96.9 °F (36.1 °C)  Pulse:  [] 74  Resp:  [0-45] 23  SpO2:  [80 %-100 %] 95 %  BP: ()/(44-93) 101/55  Arterial Line BP: ()/(39-53) 95/40   Weight: 68.4 kg (150 lb 12.7 oz)  Body mass index is 25.09 kg/m².      Intake/Output Summary (Last 24 hours) at 04/12/18 0841  Last data filed at 04/12/18 0803   Gross per 24 hour   Intake          3646.17 ml   Output             3520 ml   Net           126.17 ml       Physical Exam   Constitutional: She does not have a sickly appearance. She is sedated, chemically paralyzed and intubated.   HENT:   Head: Normocephalic and atraumatic.   Eyes: Conjunctivae are normal. Right eye exhibits no exudate. Left eye exhibits no exudate. Right eye exhibits nystagmus. Left eye exhibits nystagmus.   Pupils pinpoint and equal, unable to appreciate reactivity   Neck: Neck rigidity present. No tracheal deviation present.   Large palpable goiter present   Cardiovascular: Normal rate, regular rhythm and normal heart sounds.  PMI is not displaced.  Exam reveals no gallop and no friction rub.    No murmur heard.  Pulses:       Carotid pulses are 2+ on the right side, and 2+ on the left side.       Radial pulses are 2+ on the right side, and 2+ on the left side.        Dorsalis pedis pulses are 2+ on the right side, and 2+ on the left side.   Pulmonary/Chest: Effort normal. No accessory muscle usage. Tachypnea noted. She is intubated. No respiratory distress. She has rales in the right middle field, the right lower field, the left middle field and  the left lower field.   Abdominal: Soft. Normal appearance. Bowel sounds are absent.   Genitourinary:   Genitourinary Comments: Hill catheter draining clear, yellow urine   Musculoskeletal: Normal range of motion.   Lymphadenopathy:     She has no cervical adenopathy.   Neurological: She is unresponsive. GCS eye subscore is 1. GCS verbal subscore is 1. GCS motor subscore is 1.   Patient paralyzed   Skin: Skin is warm, dry and intact. She is not diaphoretic.   Unable to assess capillary refill due to nail polish on all nails   Nursing note and vitals reviewed.      Vents:  Vent Mode: A/C (04/12/18 0727)  Set Rate: 23 bmp (04/12/18 0727)  Vt Set: 400 mL (04/12/18 0727)  Pressure Support: 0 cmH20 (04/12/18 0727)  PEEP/CPAP: 5 cmH20 (04/12/18 0727)  Oxygen Concentration (%): 60 (04/12/18 0800)  Peak Airway Pressure: 25 cmH2O (04/12/18 0727)  Plateau Pressure: 0 cmH20 (04/12/18 0727)  Total Ve: 9.49 mL (04/12/18 0727)  F/VT Ratio<105 (RSBI): (!) 55.69 (04/12/18 0727)  Lines/Drains/Airways     Central Venous Catheter Line                 Percutaneous Central Line Insertion/Assessment - triple lumen  04/11/18 1513 right subclavian less than 1 day          Drain                 NG/OG Tube 04/11/18 1340 Left mouth less than 1 day         Urethral Catheter 04/11/18 1430 Double-lumen 16 Fr. less than 1 day          Airway                 Airway - Non-Surgical 04/11/18 1337 Endotracheal Tube less than 1 day          Arterial Line                 Arterial Line 04/11/18 1540 Right Radial less than 1 day          Peripheral Intravenous Line                 Peripheral IV - Single Lumen 04/11/18 1305 Left Antecubital less than 1 day         Peripheral IV - Single Lumen 04/11/18 1307 Right Antecubital less than 1 day              Significant Labs:    CBC/Anemia Profile:    Recent Labs  Lab 04/11/18  0409 04/11/18  1425 04/12/18  0318   WBC 30.08* 35.14* 23.38*   HGB 9.5* 9.4* 8.6*   HCT 30.5* 29.6* 28.6*   * 352* 385*   MCV  76* 77* 78*   RDW 15.5* 16.4* 15.5*        Chemistries:    Recent Labs  Lab 04/11/18  0409 04/11/18  1425 04/11/18  2030 04/12/18  0318   * 129* 131* 133*   K 4.1 3.8 3.8 3.7   CL 95 96 98 99   CO2 24 25 23 23   BUN 11 9 11 12   CREATININE 0.8 0.8 1.0 1.0   CALCIUM 8.7 8.2* 8.3* 8.1*   ALBUMIN 2.0*  --   --  1.7*   PROT 6.4  --   --  5.9*   BILITOT 0.8  --   --  0.5   ALKPHOS 118  --   --  92   ALT 28  --   --  20   AST 12  --   --  11   MG 1.5*  --  2.3 1.9   PHOS 3.1  --   --  4.3       All pertinent labs within the past 24 hours have been reviewed.    Significant Imaging:  I have reviewed all pertinent imaging results/findings within the past 24 hours.    Assessment/Plan:     Neuro   Encephalopathy, metabolic    --medication induced given need for sedation, opiates s/p intubation and need for synchrony with ventilator   --neurologically intact prior to intubation         Cerebrovascular accident (CVA)    --remote bilateral punctate CVA's  --continue home plavix        Pulmonary   * Acute hypoxemic respiratory failure    --suspect 2/2 severe bilateral pneumonia in the setting of immunosuppression.   --severely worsened over past week despite CAP treatment.   --s/p emergent intubation on 4/11 upon MICU admission for severe hypoxemia and respiratory distress.   --continue mechanical ventilation, oxygenation improved, wean support as tolerated  --bronchoscopy on 4/11 with wash and cultures pending  --start Vancomycin, Zosyn, Bactrim, Posiconazole. We appreciate ID assistance with management  --blood, sputum, urine cultures negative.   --AFB, fungal pending  --KOH prep negative, no acid fast bacilli noted  --continue steroids  --CXR today        Pneumonia of both upper lobes due to infectious organism    --see acute respiratory failure        Cardiac/Vascular   Hypertension, essential    --Hold home medications in setting of shock         ID   Septic shock    --2/2 pneumonia, likely component of iatrogenic given  amount of sedation need to keep synchronous with ventilator  --hold on 30 ml/kg bolus given degree of hypoxemia, and prior to intubation was not in shock  --start levophed and titrate to MAP >65  --Abx as above        Endocrine   Hyperglycemia    --likely drug induced  --initiate insulin gtt to keep -180        GI   PUD (peptic ulcer disease)    --protonix        Orthopedic   Seropositive rheumatoid arthritis of multiple sites    --appreciate Rheumatology assistance  --plaquenil. Hold home prednisone. Add solumedrol         Other   Long term (current) use of systemic steroids    --as above           Critical Care Daily Checklist:    A: Awake: RASS Goal/Actual Goal: RASS Goal: -5-->unarousable  Actual: [place] Agitation Sedation Scale (RASS): Unarousable   B: Spontaneous Breathing Trial Performed?     C: SAT & SBT Coordinated?  n/a                      D: Delirium: CAM-ICU Overall CAM-ICU: Positive   E: Early Mobility Performed? No   F: Feeding Goal: Goals: Pt to receive >85% EEN and EPN  Status: Nutrition Goal Status: new   Current Diet Order   Procedures    Diet NPO      AS: Analgesia/Sedation propofol   T: Thromboembolic Prophylaxis SCDs   H: HOB > 300 Yes   U: Stress Ulcer Prophylaxis (if needed) protonix   G: Glucose Control Insulin gtt   B: Bowel Function Stool Occurrence: 0   I: Indwelling Catheter (Lines & Parikh) Necessity parikh   D: De-escalation of Antimicrobials/Pharmacotherapies n/a    Plan for the day/ETD Wean vent and sedation as tolerated    Code Status:  Family/Goals of Care: Full Code       Critical Care Time: 70 minutes  Critical secondary to Patient has a condition that poses threat to life and bodily function: Acute respiratory failure with hypoxia      Critical care was time spent personally by me on the following activities: development of treatment plan with patient or surrogate and bedside caregivers, discussions with consultants, evaluation of patient's response to treatment,  examination of patient, ordering and performing treatments and interventions, ordering and review of laboratory studies, ordering and review of radiographic studies, pulse oximetry, re-evaluation of patient's condition. This critical care time did not overlap with that of any other provider or involve time for any procedures.     Caio Nevarez NP  Critical Care Medicine  [facility]

## 2018-04-13 ENCOUNTER — TELEPHONE (OUTPATIENT)
Dept: NEUROLOGY | Facility: CLINIC | Age: 83
End: 2018-04-13

## 2018-04-13 ENCOUNTER — PATIENT MESSAGE (OUTPATIENT)
Dept: NEUROLOGY | Facility: CLINIC | Age: 83
End: 2018-04-13

## 2018-04-13 PROBLEM — R79.89 ELEVATED SERUM CREATININE: Status: ACTIVE | Noted: 2018-04-13

## 2018-04-13 PROBLEM — Z78.9 ON TUBE FEEDING DIET: Status: ACTIVE | Noted: 2018-04-13

## 2018-04-13 LAB
1,3 BETA GLUCAN SPEC-MCNC: <31 PG/ML
ALBUMIN SERPL BCP-MCNC: 1.7 G/DL
ALLENS TEST: ABNORMAL
ALP SERPL-CCNC: 87 U/L
ALT SERPL W/O P-5'-P-CCNC: 17 U/L
ANION GAP SERPL CALC-SCNC: 10 MMOL/L
ANION GAP SERPL CALC-SCNC: 9 MMOL/L
ANISOCYTOSIS BLD QL SMEAR: SLIGHT
AST SERPL-CCNC: 9 U/L
BASOPHILS # BLD AUTO: 0.02 K/UL
BASOPHILS NFR BLD: 0.1 %
BILIRUB DIRECT SERPL-MCNC: 0.3 MG/DL
BILIRUB SERPL-MCNC: 0.4 MG/DL
BNP SERPL-MCNC: 84 PG/ML
BUN SERPL-MCNC: 23 MG/DL
BUN SERPL-MCNC: 29 MG/DL
CALCIUM SERPL-MCNC: 8.3 MG/DL
CALCIUM SERPL-MCNC: 8.5 MG/DL
CHLORIDE SERPL-SCNC: 101 MMOL/L
CHLORIDE SERPL-SCNC: 101 MMOL/L
CO2 SERPL-SCNC: 22 MMOL/L
CO2 SERPL-SCNC: 22 MMOL/L
CREAT SERPL-MCNC: 1.4 MG/DL
CREAT SERPL-MCNC: 1.7 MG/DL
DELSYS: ABNORMAL
DIFFERENTIAL METHOD: ABNORMAL
EOSINOPHIL # BLD AUTO: 0 K/UL
EOSINOPHIL NFR BLD: 0 %
ERYTHROCYTE [DISTWIDTH] IN BLOOD BY AUTOMATED COUNT: 15.9 %
ERYTHROCYTE [SEDIMENTATION RATE] IN BLOOD BY WESTERGREN METHOD: 25 MM/H
EST. GFR  (AFRICAN AMERICAN): 32.4 ML/MIN/1.73 M^2
EST. GFR  (AFRICAN AMERICAN): 40.9 ML/MIN/1.73 M^2
EST. GFR  (NON AFRICAN AMERICAN): 28.1 ML/MIN/1.73 M^2
EST. GFR  (NON AFRICAN AMERICAN): 35.5 ML/MIN/1.73 M^2
FIO2: 50
FIO2: 60
FIO2: 60
GALACTOMANNAN AG SPEC-ACNC: <0.5 INDEX
GLUCOSE SERPL-MCNC: 136 MG/DL
GLUCOSE SERPL-MCNC: 154 MG/DL
HCO3 UR-SCNC: 23.6 MMOL/L (ref 24–28)
HCO3 UR-SCNC: 24.5 MMOL/L (ref 24–28)
HCO3 UR-SCNC: 24.8 MMOL/L (ref 24–28)
HCT VFR BLD AUTO: 26.1 %
HGB BLD-MCNC: 8.4 G/DL
HISTOPLASMA AG VALUE: 0 NG/ML
HISTOPLASMA ANTIGEN URINE: NEGATIVE
HYPOCHROMIA BLD QL SMEAR: ABNORMAL
IMM GRANULOCYTES # BLD AUTO: 0.37 K/UL
IMM GRANULOCYTES NFR BLD AUTO: 1.2 %
INR PPP: 1.2
L PNEUMO AG UR QL IA: NOT DETECTED
LYMPHOCYTES # BLD AUTO: 1.1 K/UL
LYMPHOCYTES NFR BLD: 3.6 %
MAGNESIUM SERPL-MCNC: 2.3 MG/DL
MCH RBC QN AUTO: 24.3 PG
MCHC RBC AUTO-ENTMCNC: 32.2 G/DL
MCV RBC AUTO: 75 FL
MODE: ABNORMAL
MONOCYTES # BLD AUTO: 2 K/UL
MONOCYTES NFR BLD: 6.4 %
NEUTROPHILS # BLD AUTO: 27.9 K/UL
NEUTROPHILS NFR BLD: 88.7 %
NRBC BLD-RTO: 0 /100 WBC
OVALOCYTES BLD QL SMEAR: ABNORMAL
PCO2 BLDA: 44.8 MMHG (ref 35–45)
PCO2 BLDA: 47.3 MMHG (ref 35–45)
PCO2 BLDA: 50.1 MMHG (ref 35–45)
PEEP: 7
PEEP: 8
PEEP: 8
PH SMN: 7.3 [PH] (ref 7.35–7.45)
PH SMN: 7.3 [PH] (ref 7.35–7.45)
PH SMN: 7.35 [PH] (ref 7.35–7.45)
PHOSPHATE SERPL-MCNC: 4.3 MG/DL
PLATELET # BLD AUTO: 376 K/UL
PLATELET BLD QL SMEAR: ABNORMAL
PMV BLD AUTO: 10.2 FL
PO2 BLDA: 56 MMHG (ref 80–100)
PO2 BLDA: 61 MMHG (ref 80–100)
PO2 BLDA: 63 MMHG (ref 80–100)
POC BE: -1 MMOL/L
POC BE: -2 MMOL/L
POC BE: -3 MMOL/L
POC SATURATED O2: 87 % (ref 95–100)
POC SATURATED O2: 88 % (ref 95–100)
POC SATURATED O2: 89 % (ref 95–100)
POC TCO2: 25 MMOL/L (ref 23–27)
POC TCO2: 26 MMOL/L (ref 23–27)
POC TCO2: 26 MMOL/L (ref 23–27)
POCT GLUCOSE: 127 MG/DL (ref 70–110)
POCT GLUCOSE: 131 MG/DL (ref 70–110)
POCT GLUCOSE: 133 MG/DL (ref 70–110)
POCT GLUCOSE: 137 MG/DL (ref 70–110)
POCT GLUCOSE: 142 MG/DL (ref 70–110)
POCT GLUCOSE: 142 MG/DL (ref 70–110)
POCT GLUCOSE: 146 MG/DL (ref 70–110)
POCT GLUCOSE: 153 MG/DL (ref 70–110)
POCT GLUCOSE: 153 MG/DL (ref 70–110)
POCT GLUCOSE: 156 MG/DL (ref 70–110)
POCT GLUCOSE: 158 MG/DL (ref 70–110)
POCT GLUCOSE: 159 MG/DL (ref 70–110)
POCT GLUCOSE: 160 MG/DL (ref 70–110)
POCT GLUCOSE: 166 MG/DL (ref 70–110)
POCT GLUCOSE: 167 MG/DL (ref 70–110)
POCT GLUCOSE: 169 MG/DL (ref 70–110)
POCT GLUCOSE: 170 MG/DL (ref 70–110)
POIKILOCYTOSIS BLD QL SMEAR: SLIGHT
POLYCHROMASIA BLD QL SMEAR: ABNORMAL
POTASSIUM SERPL-SCNC: 4.4 MMOL/L
POTASSIUM SERPL-SCNC: 4.6 MMOL/L
PROT SERPL-MCNC: 5.8 G/DL
PROTHROMBIN TIME: 12.2 SEC
RBC # BLD AUTO: 3.46 M/UL
SAMPLE: ABNORMAL
SITE: ABNORMAL
SODIUM SERPL-SCNC: 132 MMOL/L
SODIUM SERPL-SCNC: 133 MMOL/L
VANCOMYCIN SERPL-MCNC: 19.5 UG/ML
VT: 350
WBC # BLD AUTO: 31.44 K/UL

## 2018-04-13 PROCEDURE — 63600175 PHARM REV CODE 636 W HCPCS: Performed by: STUDENT IN AN ORGANIZED HEALTH CARE EDUCATION/TRAINING PROGRAM

## 2018-04-13 PROCEDURE — 63600175 PHARM REV CODE 636 W HCPCS: Performed by: GENERAL ACUTE CARE HOSPITAL

## 2018-04-13 PROCEDURE — 25000003 PHARM REV CODE 250: Performed by: NURSE PRACTITIONER

## 2018-04-13 PROCEDURE — 84100 ASSAY OF PHOSPHORUS: CPT

## 2018-04-13 PROCEDURE — 99233 SBSQ HOSP IP/OBS HIGH 50: CPT | Mod: ,,, | Performed by: INTERNAL MEDICINE

## 2018-04-13 PROCEDURE — 94640 AIRWAY INHALATION TREATMENT: CPT

## 2018-04-13 PROCEDURE — 97168 OT RE-EVAL EST PLAN CARE: CPT

## 2018-04-13 PROCEDURE — 80202 ASSAY OF VANCOMYCIN: CPT

## 2018-04-13 PROCEDURE — 94761 N-INVAS EAR/PLS OXIMETRY MLT: CPT

## 2018-04-13 PROCEDURE — S0039 INJECTION, SULFAMETHOXAZOLE: HCPCS | Performed by: NURSE PRACTITIONER

## 2018-04-13 PROCEDURE — 80048 BASIC METABOLIC PNL TOTAL CA: CPT | Mod: 91

## 2018-04-13 PROCEDURE — 25000003 PHARM REV CODE 250: Performed by: STUDENT IN AN ORGANIZED HEALTH CARE EDUCATION/TRAINING PROGRAM

## 2018-04-13 PROCEDURE — 99900026 HC AIRWAY MAINTENANCE (STAT)

## 2018-04-13 PROCEDURE — 83735 ASSAY OF MAGNESIUM: CPT

## 2018-04-13 PROCEDURE — 27000221 HC OXYGEN, UP TO 24 HOURS

## 2018-04-13 PROCEDURE — 25000242 PHARM REV CODE 250 ALT 637 W/ HCPCS: Performed by: INTERNAL MEDICINE

## 2018-04-13 PROCEDURE — 63600175 PHARM REV CODE 636 W HCPCS: Performed by: INTERNAL MEDICINE

## 2018-04-13 PROCEDURE — 85025 COMPLETE CBC W/AUTO DIFF WBC: CPT

## 2018-04-13 PROCEDURE — 25000003 PHARM REV CODE 250: Performed by: INTERNAL MEDICINE

## 2018-04-13 PROCEDURE — 80076 HEPATIC FUNCTION PANEL: CPT

## 2018-04-13 PROCEDURE — 63600175 PHARM REV CODE 636 W HCPCS: Performed by: NURSE PRACTITIONER

## 2018-04-13 PROCEDURE — 83880 ASSAY OF NATRIURETIC PEPTIDE: CPT

## 2018-04-13 PROCEDURE — 97110 THERAPEUTIC EXERCISES: CPT

## 2018-04-13 PROCEDURE — 20000000 HC ICU ROOM

## 2018-04-13 PROCEDURE — 99291 CRITICAL CARE FIRST HOUR: CPT | Mod: GC,,, | Performed by: INTERNAL MEDICINE

## 2018-04-13 PROCEDURE — 85610 PROTHROMBIN TIME: CPT

## 2018-04-13 PROCEDURE — 37799 UNLISTED PX VASCULAR SURGERY: CPT

## 2018-04-13 PROCEDURE — 99900035 HC TECH TIME PER 15 MIN (STAT)

## 2018-04-13 PROCEDURE — 82803 BLOOD GASES ANY COMBINATION: CPT

## 2018-04-13 RX ORDER — INSULIN ASPART 100 [IU]/ML
3 INJECTION, SOLUTION INTRAVENOUS; SUBCUTANEOUS
Status: DISCONTINUED | OUTPATIENT
Start: 2018-04-13 | End: 2018-04-19

## 2018-04-13 RX ORDER — AMOXICILLIN 250 MG
1 CAPSULE ORAL 2 TIMES DAILY
Status: DISCONTINUED | OUTPATIENT
Start: 2018-04-13 | End: 2018-04-21

## 2018-04-13 RX ORDER — VANCOMYCIN/0.9 % SOD CHLORIDE 1 G/100 ML
1000 PLASTIC BAG, INJECTION (ML) INTRAVENOUS ONCE
Status: COMPLETED | OUTPATIENT
Start: 2018-04-13 | End: 2018-04-13

## 2018-04-13 RX ORDER — MAGNESIUM SULFATE HEPTAHYDRATE 40 MG/ML
2 INJECTION, SOLUTION INTRAVENOUS
Status: DISCONTINUED | OUTPATIENT
Start: 2018-04-13 | End: 2018-04-23

## 2018-04-13 RX ORDER — SODIUM,POTASSIUM PHOSPHATES 280-250MG
2 POWDER IN PACKET (EA) ORAL
Status: DISCONTINUED | OUTPATIENT
Start: 2018-04-13 | End: 2018-04-23

## 2018-04-13 RX ORDER — POTASSIUM CHLORIDE 20 MEQ/15ML
60 SOLUTION ORAL
Status: DISCONTINUED | OUTPATIENT
Start: 2018-04-13 | End: 2018-04-16

## 2018-04-13 RX ORDER — PROPOFOL 10 MG/ML
5 INJECTION, EMULSION INTRAVENOUS CONTINUOUS
Status: DISCONTINUED | OUTPATIENT
Start: 2018-04-13 | End: 2018-04-18

## 2018-04-13 RX ORDER — INSULIN ASPART 100 [IU]/ML
3 INJECTION, SOLUTION INTRAVENOUS; SUBCUTANEOUS
Status: DISCONTINUED | OUTPATIENT
Start: 2018-04-14 | End: 2018-04-19

## 2018-04-13 RX ORDER — POTASSIUM CHLORIDE 20 MEQ/15ML
40 SOLUTION ORAL
Status: DISCONTINUED | OUTPATIENT
Start: 2018-04-13 | End: 2018-04-16

## 2018-04-13 RX ORDER — INSULIN ASPART 100 [IU]/ML
1-10 INJECTION, SOLUTION INTRAVENOUS; SUBCUTANEOUS EVERY 4 HOURS PRN
Status: DISCONTINUED | OUTPATIENT
Start: 2018-04-13 | End: 2018-05-01

## 2018-04-13 RX ORDER — DEXMEDETOMIDINE HYDROCHLORIDE 4 UG/ML
0.2 INJECTION, SOLUTION INTRAVENOUS CONTINUOUS
Status: DISCONTINUED | OUTPATIENT
Start: 2018-04-13 | End: 2018-04-13

## 2018-04-13 RX ORDER — POLYETHYLENE GLYCOL 3350 17 G/17G
17 POWDER, FOR SOLUTION ORAL DAILY
Status: DISCONTINUED | OUTPATIENT
Start: 2018-04-13 | End: 2018-04-18

## 2018-04-13 RX ORDER — GLUCAGON 1 MG
1 KIT INJECTION
Status: DISCONTINUED | OUTPATIENT
Start: 2018-04-13 | End: 2018-05-02 | Stop reason: HOSPADM

## 2018-04-13 RX ADMIN — INSULIN ASPART 3 UNITS: 100 INJECTION, SOLUTION INTRAVENOUS; SUBCUTANEOUS at 12:04

## 2018-04-13 RX ADMIN — DEXTROSE 300 MG: 5 SOLUTION INTRAVENOUS at 08:04

## 2018-04-13 RX ADMIN — SULFAMETHOXAZOLE AND TRIMETHOPRIM 340 MG: 80; 16 INJECTION, SOLUTION, CONCENTRATE INTRAVENOUS at 09:04

## 2018-04-13 RX ADMIN — Medication 100 MCG/HR: at 11:04

## 2018-04-13 RX ADMIN — MINERAL OIL AND WHITE PETROLATUM: 150; 830 OINTMENT OPHTHALMIC at 06:04

## 2018-04-13 RX ADMIN — SULFAMETHOXAZOLE AND TRIMETHOPRIM 340 MG: 80; 16 INJECTION, SOLUTION, CONCENTRATE INTRAVENOUS at 01:04

## 2018-04-13 RX ADMIN — Medication 0.06 MCG/KG/MIN: at 05:04

## 2018-04-13 RX ADMIN — STANDARDIZED SENNA CONCENTRATE AND DOCUSATE SODIUM 1 TABLET: 8.6; 5 TABLET, FILM COATED ORAL at 12:04

## 2018-04-13 RX ADMIN — PANTOPRAZOLE SODIUM 40 MG: 40 GRANULE, DELAYED RELEASE ORAL at 09:04

## 2018-04-13 RX ADMIN — METHYLPREDNISOLONE SODIUM SUCCINATE 80 MG: 125 INJECTION, POWDER, FOR SOLUTION INTRAMUSCULAR; INTRAVENOUS at 08:04

## 2018-04-13 RX ADMIN — PROPOFOL 35 MCG/KG/MIN: 10 INJECTION, EMULSION INTRAVENOUS at 09:04

## 2018-04-13 RX ADMIN — MINERAL OIL AND WHITE PETROLATUM: 150; 830 OINTMENT OPHTHALMIC at 10:04

## 2018-04-13 RX ADMIN — CLOPIDOGREL 75 MG: 75 TABLET, FILM COATED ORAL at 09:04

## 2018-04-13 RX ADMIN — IPRATROPIUM BROMIDE AND ALBUTEROL SULFATE 3 ML: .5; 3 SOLUTION RESPIRATORY (INHALATION) at 03:04

## 2018-04-13 RX ADMIN — POLYETHYLENE GLYCOL 3350 17 G: 17 POWDER, FOR SOLUTION ORAL at 12:04

## 2018-04-13 RX ADMIN — MINERAL OIL AND WHITE PETROLATUM: 150; 830 OINTMENT OPHTHALMIC at 02:04

## 2018-04-13 RX ADMIN — IPRATROPIUM BROMIDE AND ALBUTEROL SULFATE 3 ML: .5; 3 SOLUTION RESPIRATORY (INHALATION) at 07:04

## 2018-04-13 RX ADMIN — IPRATROPIUM BROMIDE AND ALBUTEROL SULFATE 3 ML: .5; 3 SOLUTION RESPIRATORY (INHALATION) at 11:04

## 2018-04-13 RX ADMIN — STANDARDIZED SENNA CONCENTRATE AND DOCUSATE SODIUM 1 TABLET: 8.6; 5 TABLET, FILM COATED ORAL at 08:04

## 2018-04-13 RX ADMIN — PROPOFOL 40 MCG/KG/MIN: 10 INJECTION, EMULSION INTRAVENOUS at 12:04

## 2018-04-13 RX ADMIN — PIPERACILLIN AND TAZOBACTAM 4.5 G: 4; .5 INJECTION, POWDER, LYOPHILIZED, FOR SOLUTION INTRAVENOUS; PARENTERAL at 02:04

## 2018-04-13 RX ADMIN — INSULIN ASPART 2 UNITS: 100 INJECTION, SOLUTION INTRAVENOUS; SUBCUTANEOUS at 08:04

## 2018-04-13 RX ADMIN — INSULIN ASPART 3 UNITS: 100 INJECTION, SOLUTION INTRAVENOUS; SUBCUTANEOUS at 08:04

## 2018-04-13 RX ADMIN — PROPOFOL 30 MCG/KG/MIN: 10 INJECTION, EMULSION INTRAVENOUS at 11:04

## 2018-04-13 RX ADMIN — CHLORHEXIDINE GLUCONATE 15 ML: 1.2 RINSE ORAL at 09:04

## 2018-04-13 RX ADMIN — PIPERACILLIN AND TAZOBACTAM 4.5 G: 4; .5 INJECTION, POWDER, LYOPHILIZED, FOR SOLUTION INTRAVENOUS; PARENTERAL at 06:04

## 2018-04-13 RX ADMIN — INSULIN ASPART 3 UNITS: 100 INJECTION, SOLUTION INTRAVENOUS; SUBCUTANEOUS at 04:04

## 2018-04-13 RX ADMIN — Medication 0.08 MCG/KG/MIN: at 12:04

## 2018-04-13 RX ADMIN — Medication 1 G: at 12:04

## 2018-04-13 RX ADMIN — METHYLPREDNISOLONE SODIUM SUCCINATE 80 MG: 125 INJECTION, POWDER, FOR SOLUTION INTRAMUSCULAR; INTRAVENOUS at 09:04

## 2018-04-13 RX ADMIN — PIPERACILLIN AND TAZOBACTAM 4.5 G: 4; .5 INJECTION, POWDER, LYOPHILIZED, FOR SOLUTION INTRAVENOUS; PARENTERAL at 10:04

## 2018-04-13 RX ADMIN — HYDROXYCHLOROQUINE SULFATE 400 MG: 200 TABLET, FILM COATED ORAL at 09:04

## 2018-04-13 RX ADMIN — CHLORHEXIDINE GLUCONATE 15 ML: 1.2 RINSE ORAL at 08:04

## 2018-04-13 RX ADMIN — ENOXAPARIN SODIUM 40 MG: 100 INJECTION SUBCUTANEOUS at 05:04

## 2018-04-13 NOTE — PLAN OF CARE
Problem: Patient Care Overview  Goal: Plan of Care Review  Outcome: Ongoing (interventions implemented as appropriate)  No acute events throughout day. See vital signs and assessments in flowsheets. See below for updates on today's progress.     Pulmonary: Daily weaning from sedation done in the morning. As per SAT/SBT assessment/criteria and ABG's results, patient is not tolerating the said trials. Dr. Kirkpatrick ordered to resume back sedation and repeat the trial tomorrow.    Cardiovascular: NSR to ST =  bpm    Neurological: Currently, deeply sedated with propofol and fentanyl. During SAT/SBT patient was obeying commands and moving all limbs.    Gastrointestinal: On enteral feeding continuously via pump 30ml per hour with target rate of 40ml/hour. No residuals.    Genitourinary: Hill catheter in situ, urine output per hour ranges from 30-35ml hour.     Endocrine: CBG monitoring every 4 hours and has scheduled aspart 3units (see MAR for details of the order)    Integumentary/Other: Skin is intact, with preventive dressing / device in pressure areas.    Infusions: Levophed, Fentanyl and Propofol.    Patient progressing towards goals as tolerated, plan of care communicated and reviewed with Selma Lux MD and family. All concerns addressed. Will continue to monitor.

## 2018-04-13 NOTE — ASSESSMENT & PLAN NOTE
83yo woman w/a history of prior CVA, RA (dx 2012; RF seropositive, erosive; on HCQ/prednisone 5mg and recently started humira first dose 3/22/2018), IBS, and Hasimotos thyroiditis (goiter) who was admitted on 4/5/2018 with 2 days of dry cough and progressive SOB due to multifocal PNA (with peripheral and basilar lesions noted on CT chest) that has progressively worsened despite CAP coverage x5 days, requiring intubation on 4/12 for hypoxic RF. She remains critically ill but has plateaued clinically on expanded coverage for all possibilities on differential of hypoxic RF (most notable for MDRO bacterial HCAP/HAP, IFI including Aspergillus and endemic fungi with PCP less likely given steroid dose, and  vs hypersensitivity pneumonitis). Course c/b KERMIT and given that PCP is low on the differential (given CT chest appearance and relatively low steroid dose of 5mg daily), would stop bactrim with 1 negative GMS stain already completed.    - would continue coverage for HCAP with vanc/zosyn while awaiting pending cultures (vanc trough goal 15) -- she has completed 5 days of atypical coverage with azithromycin already; assuming negative cultures at 72h, can stop vancomycin  - will continue posaconazole for coverage of the above mold species and await pending fungal markers  - would stop high dose bactrim today and transition to prophylaxis (while on high dose steroids)   - may continue steroids for now while awaiting final pathology  - await pending BAL cultures, cytology, and fungal markers  - will tailor therapy with you as she improves

## 2018-04-13 NOTE — PLAN OF CARE
Problem: Patient Care Overview  Goal: Plan of Care Review  Outcome: Ongoing (interventions implemented as appropriate)  No acute changes this shift. Pt. Responds to painful stimuli. MAPs on cuff pressure remain greater than 65. Urine output remains minimal overnight, see flow sheets for further details. Plan of care is to wean vent settings and sedation as tolerated. Patient and family informed of plan of care. Family in agreement with plan. Will continue to monitor.

## 2018-04-13 NOTE — ASSESSMENT & PLAN NOTE
- Hx of Hashimoto's thyroiditis.  - Intrathoracic extension on CT.  - TSH and free T4 wnl.  - Appreciate Anesthesia assistance with intubation.

## 2018-04-13 NOTE — PLAN OF CARE
Problem: Occupational Therapy Goal  Goal: Occupational Therapy Goal  Goals to be met by: 4/20/18    Patient will increase functional independence with ADLs by performing:  Pt to follow all simple commands to allow for improved participation and engagement with ADL skills.  Pt to perform basic g/h skills in sitting with MIN A  Pt/fly to be independent with ROM/positioning skills.  Pt to tolerate sitting EOB with Fair balance in prep for furher ADL and transfer training.          Goals and POC updated this date.

## 2018-04-13 NOTE — ASSESSMENT & PLAN NOTE
- Appreciate Rheumatology assistance.  - Continue hydroxychloroquine.  - Hold home prednisone; switched to IV SoluMedrol 80mg Q12h.

## 2018-04-13 NOTE — ASSESSMENT & PLAN NOTE
- Suspect secondary to severe bilateral pneumonia in the setting of immunosuppression.  - Severely worsened over first week of admission despite CAP treatment.  - s/p emergent intubation on 4/11 upon MICU admission for severe hypoxemia and severe respiratory distress.  - Continue mechanical ventilation, oxygenation improved, wean support as tolerated.  - Bronchoscopy on 4/11 with wash; cultures pending.  - Antibiotics broadened to vancomycin, zosyn, bactrim, and posaconazole; appreciate ID assistance with management.  - Blood, sputum, and urine cultures negative.   - AFB and fungal cultures pending.  - KOH prep negative, AFB stain with no acid fast bacilli noted.  - Aspergillus in blood is negative.  - Cytology pending.  - CXR on 4/12 with no change.  - Continue antibiotics and IV steroids.  - P/F ratio worsened and patient is requiring increase PEEP and FiO2 to maintain SpO2 >88%.  - Will repeat ABG in the afternoon.

## 2018-04-13 NOTE — ASSESSMENT & PLAN NOTE
- sCr increased to 1.4 this morning from 0.9 on admission.  - Likely secondary to Bactrim as it affects the kidney ability to excrete Cr.

## 2018-04-13 NOTE — PROGRESS NOTES
[facility]  Critical Care Medicine  Progress Note    Patient Name: Selma Rosado MD Jose J  MRN: [id number]  Admission Date: 4/5/2018  Hospital Length of Stay: 8 days  Code Status: Full Code  Attending Provider: Patricia Kirkpatrick MD  Primary Care Provider: Bhargav Hirsch MD   Principal Problem: Acute hypoxemic respiratory failure    Subjective:     HPI:  Mrs. Lux is a 81 yo female with history significant for CVA, RA on plaquenil/prednisone and recently started on Humiria (3/22), HTN, and large goiter who originally presented [place] [facility] [place]5 with complaints of continually worsening shortness of breath, cough, and fatigue that started about 2 weeks prior. She was seen by her PCP on 4/2 for these complaints and received a prescription for duo nebs and tessalon perles. CXR performed on 4/2 noted bilateral basilar infiltrates. At that time, she denied fever, chills, sputum production, sick contacts. She was admitted to  and treated emprically for CAP with rocephin and azithromycin. She was also diuresed daily. However, her oxygen requirements slowly increased from 2 L NC to 4 L NC with also progressively worsening bilateral infiltrates on imaging. CTA performed on 4/9 negative for PE, however noted significant bilateral consolidations. On 4/11, her hypoxemia continued to worsen, requiring NRB mask, and she developed severe respiratory distress. She was transferred to the MICU and emergently intubated. Repeat CT chest performed on 4/11 demonstrated progressive worsening of bilateral peripheral infiltrates.    Hospital/ICU Course:  Admitted to MICU on 4/11, intubated emergently for severe hypoxemia and respiratory distress. ID was consulted for assistance in management given immunocompromised state. Patient paralyzed with nimbex following persistent dysynchrony with vent despite sedation. Abx broadened to vancomycin, zosyn, bactrim, and posaconazole. Levophed initiated for BP support.  Bronchoscopy performed at bedside on 4/11. Aspergillus negative in blood, but BAL is still pending. Bronch cultures are pending.    Interval History/Significant Events: NAEON. Continuing to wean down sedation to evaluate for extubation, however, P/F ratio worsened and patient is requiring increase PEEP and FiO2 to maintain SpO2 >88%. Will repeat ABG in the afternoon.    Review of Systems   Unable to perform ROS: Intubated     Objective:     Vital Signs (Most Recent):  Temp: 99 °F (37.2 °C) (04/13/18 1105)  Pulse: 84 (04/13/18 1200)  Resp: (!) 36 (04/13/18 1200)  BP: (!) 104/51 (04/13/18 1105)  SpO2: (!) 92 % (04/13/18 1200) Vital Signs (24h Range):  Temp:  [96.4 °F (35.8 °C)-99 °F (37.2 °C)] 99 °F (37.2 °C)  Pulse:  [] 84  Resp:  [25-36] 36  SpO2:  [86 %-99 %] 92 %  BP: ()/(50-58) 104/51  Arterial Line BP: ()/(38-53) 88/40   Weight: 71.5 kg (157 lb 10.1 oz)  Body mass index is 26.23 kg/m².      Intake/Output Summary (Last 24 hours) at 04/13/18 1210  Last data filed at 04/13/18 1200   Gross per 24 hour   Intake          3380.85 ml   Output              861 ml   Net          2519.85 ml       Physical Exam   Constitutional: She appears well-developed. She appears lethargic. She does not have a sickly appearance. No distress. She is sedated and intubated.   HENT:   Head: Normocephalic and atraumatic.   Eyes: Conjunctivae are normal. Right eye exhibits no exudate. Left eye exhibits no exudate.   Pupils pinpoint and equal, unable to appreciate reactivity   Neck: No tracheal deviation present. Thyromegaly present.   Large palpable goiter present   Cardiovascular: Normal rate, regular rhythm and normal heart sounds.  PMI is not displaced.  Exam reveals no gallop and no friction rub.    No murmur heard.  Pulses:       Carotid pulses are 2+ on the right side, and 2+ on the left side.       Radial pulses are 2+ on the right side, and 2+ on the left side.        Dorsalis pedis pulses are 2+ on the right side,  and 2+ on the left side.   Pulmonary/Chest: Effort normal. No accessory muscle usage. Tachypnea noted. She is intubated. No respiratory distress. She has rales in the right middle field, the right lower field, the left middle field and the left lower field.   Abdominal: Soft. Normal appearance. She exhibits no distension. Bowel sounds are decreased. There is no rigidity.   Genitourinary:   Genitourinary Comments: Hill catheter draining clear, yellow urine   Musculoskeletal: Normal range of motion. She exhibits no edema or tenderness.   Lymphadenopathy:     She has no cervical adenopathy.   Neurological: She appears lethargic. GCS eye subscore is 3. GCS verbal subscore is 1. GCS motor subscore is 6.   Opens her eyes to verbal stimuli  Follows command  [id number], GCS 10   Skin: Skin is warm, dry and intact. She is not diaphoretic.   Nursing note and vitals reviewed.      Vents:  Vent Mode: A/C (04/13/18 1148)  Set Rate: 25 bmp (04/13/18 1148)  Vt Set: 350 mL (04/13/18 1148)  Pressure Support: 0 cmH20 (04/13/18 1148)  PEEP/CPAP: 7 cmH20 (04/13/18 1148)  Oxygen Concentration (%): 60 (04/13/18 1200)  Peak Airway Pressure: 27 cmH2O (04/13/18 1148)  Plateau Pressure: 25 cmH20 (04/13/18 1148)  Total Ve: 10.2 mL (04/13/18 1148)  F/VT Ratio<105 (RSBI): (!) 72.25 (04/13/18 1148)     Lines/Drains/Airways     Central Venous Catheter Line                 Percutaneous Central Line Insertion/Assessment - triple lumen  04/11/18 1513 right subclavian 1 day          Drain                 NG/OG Tube 04/11/18 1340 Left mouth 1 day         Urethral Catheter 04/11/18 1430 Double-lumen 16 Fr. 1 day          Airway                 Airway - Non-Surgical 04/11/18 1337 Endotracheal Tube 1 day          Arterial Line                 Arterial Line 04/11/18 1540 Right Radial 1 day          Peripheral Intravenous Line                 Peripheral IV - Single Lumen 04/11/18 1305 Left Antecubital 1 day         Peripheral IV - Single Lumen 04/11/18 1307  Right Antecubital 1 day              Significant Labs:    CBC/Anemia Profile:    Recent Labs  Lab 04/11/18  1425 04/12/18 0318 04/13/18  0243   WBC 35.14* 23.38* 31.44*   HGB 9.4* 8.6* 8.4*   HCT 29.6* 28.6* 26.1*   * 385* 376*   MCV 77* 78* 75*   RDW 16.4* 15.5* 15.9*        Chemistries:    Recent Labs  Lab 04/12/18 0318 04/12/18  1446 04/12/18 2006 04/13/18  0243 04/13/18  0415   * 134* 136  --  132*   K 3.7 3.4* 3.9  --  4.4   CL 99 101 102  --  101   CO2 23 25 24  --  22*   BUN 12 14 16  --  23   CREATININE 1.0 1.1 1.2  --  1.4   CALCIUM 8.1* 8.3* 8.5*  --  8.5*   ALBUMIN 1.7*  --   --  1.7*  --    PROT 5.9*  --   --  5.8*  --    BILITOT 0.5  --   --  0.4  --    ALKPHOS 92  --   --  87  --    ALT 20  --   --  17  --    AST 11  --   --  9*  --    MG 1.9 2.2  --   --  2.3   PHOS 4.3  --   --   --  4.3       All pertinent labs within the past 24 hours have been reviewed.    Significant Imaging:  I have reviewed all pertinent imaging results/findings within the past 24 hours.    Assessment/Plan:     Neuro   Encephalopathy, metabolic    - Neurologically intact prior to intubation.  - Medication-induced given need for sedation and opiates s/p intubation and need for synchrony with ventilator.  - Follows command off sedation.  - Weaning sedation as tolerated.        Cerebrovascular accident (CVA)    - Remote bilateral punctate CVAs.  - Continue home clopidogrel.        Pulmonary   * Acute hypoxemic respiratory failure  ARDS (adult respiratory distress syndrome)  Pneumonia of both upper lobes due to infectious organism    - Suspect secondary to severe bilateral pneumonia in the setting of immunosuppression.  - Severely worsened over first week of admission despite CAP treatment.  - s/p emergent intubation on 4/11 upon MICU admission for severe hypoxemia and severe respiratory distress.  - Continue mechanical ventilation, oxygenation improved, wean support as tolerated.  - Bronchoscopy on 4/11 with wash;  cultures pending.  - Antibiotics broadened to vancomycin, zosyn, bactrim, and posaconazole; appreciate ID assistance with management.  - Blood, sputum, and urine cultures negative.   - AFB and fungal cultures pending.  - KOH prep negative, AFB stain with no acid fast bacilli noted.  - Aspergillus in blood is negative.  - Cytology pending.  - CXR on 4/12 with no change.  - Continue antibiotics and IV steroids.  - P/F ratio worsened and patient is requiring increase PEEP and FiO2 to maintain SpO2 >88%.  - Will repeat ABG in the afternoon.        Cardiac/Vascular   Hypertension, essential    - Holding home medications in setting of shock.        ID   Septic shock    - Likely secondary to pneumonia with iatrogenic component given amount of sedation need to keep the patient synchronous with ventilator.  - Holding off 30 mL/kg bolus given degree of hypoxemia, and the fact that prior to intubation patient was not in shock.  - Started on norepinephrine and titrating to MAP >65; wean as tolerated.  - Likely will be off pressors once sedation is turned off completely.  - Continue antibiotics.        Endocrine   On tube feeding diet    - Appreciate nutrition assistance.  - TF advanced today.        Hyperglycemia    - Likely steroid-induced.  - Started on insulin gtt; will transition to SQ insulin.  - Goal -180.        Thyroid enlargement    - Hx of Hashimoto's thyroiditis.  - Intrathoracic extension on CT.  - TSH and free T4 wnl.  - Appreciate Anesthesia assistance with intubation.        GI   PUD (peptic ulcer disease)    - Continue PPI.        Rheumatology   Seropositive rheumatoid arthritis of multiple sites  Long term (current) use of systemic steroids    - Appreciate Rheumatology assistance.  - Continue hydroxychloroquine.  - Hold home prednisone; switched to IV SoluMedrol 80mg Q12h.        Other   Elevated serum creatinine    - sCr increased to 1.4 this morning from 0.9 on admission.  - Likely secondary to Bactrim  as it affects the kidney ability to excrete Cr.              Critical Care Daily Checklist:    A: Awake: RASS Goal/Actual Goal: RASS Goal: -4-->deep sedation  Actual: [place] Agitation Sedation Scale (RASS): Light sedation   B: Spontaneous Breathing Trial Performed?  No, patient requiring higher PEEP and FiO2   C: SAT & SBT Coordinated?  N/A                      D: Delirium: CAM-ICU Overall CAM-ICU: Positive   E: Early Mobility Performed? No   F: Feeding Goal: Goals: Pt to receive >85% EEN and EPN  Status: Nutrition Goal Status: new   Current Diet Order   Procedures    Diet NPO      AS: Analgesia/Sedation Propofol and Fentanyl   T: Thromboembolic Prophylaxis Enoxaparin   H: HOB > 300 Yes   U: Stress Ulcer Prophylaxis (if needed) PPI   G: Glucose Control Insulin gtt; will transition to SQ   B: Bowel Function Stool Occurrence: 0   I: Indwelling Catheter (Lines & Hill) Necessity ETT, triple lumen CVC, A-line, NG tube, Hill's, and peripheral IVs   D: De-escalation of Antimicrobials/Pharmacotherapies Zosyn, Vancomycin, Bactrim, and Posaconazole    Plan for the day/ETD Re-evaluate for possible SBT, De-escalation of antimicrobials    Code Status:  Family/Goals of Care: Full Code         Critical secondary to Patient has a condition that poses threat to life and bodily function: ARDS and Septic Shock  Patient is currently on drug therapy requiring intensive monitoring for toxicity: Vancomycin  Patient is currently receiving parenteral controlled substances: Propofol and Fentanyl      Critical care was time spent personally by me on the following activities: development of treatment plan with patient or surrogate and bedside caregivers, discussions with consultants, evaluation of patient's response to treatment, examination of patient, ordering and performing treatments and interventions, ordering and review of laboratory studies, ordering and review of radiographic studies, pulse oximetry, re-evaluation of patient's  condition. This critical care time did not overlap with that of any other provider or involve time for any procedures.       Hola Avendaño MD  Critical Care Medicine  [facility]  Pager # [phone]  SpectraLink # [phone]

## 2018-04-13 NOTE — PROGRESS NOTES
[facility]  Infectious Disease  Progress Note    Patient Name: Selma Alonzo Lux MD  MRN: [id number]  Admission Date: 4/5/2018  Length of Stay: 8 days  Attending Physician: Patricia Kirkpatrick MD  Primary Care Provider: Bhargav Hirsch MD    Isolation Status: No active isolations  Assessment/Plan:      * Acute hypoxemic respiratory failure    81yo woman w/a history of prior CVA, RA (dx 2012; RF seropositive, erosive; on HCQ/prednisone 5mg and recently started humira first dose 3/22/2018), IBS, and Hasimotos thyroiditis (goiter) who was admitted on 4/5/2018 with 2 days of dry cough and progressive SOB due to multifocal PNA (with peripheral and basilar lesions noted on CT chest) that has progressively worsened despite CAP coverage x5 days, requiring intubation on 4/12 for hypoxic RF. She remains critically ill but has plateaued clinically on expanded coverage for all possibilities on differential of hypoxic RF (most notable for MDRO bacterial HCAP/HAP, IFI including Aspergillus and endemic fungi with PCP less likely given steroid dose, and  vs hypersensitivity pneumonitis). Course c/b KERMIT and given that PCP is low on the differential (given CT chest appearance and relatively low steroid dose of 5mg daily), would stop bactrim with 1 negative GMS stain already completed.    - would continue coverage for HCAP with vanc/zosyn while awaiting pending cultures (vanc trough goal 15) -- she has completed 5 days of atypical coverage with azithromycin already; assuming negative cultures at 72h, can stop vancomycin  - will continue posaconazole for coverage of the above mold species and await pending fungal markers  - would stop high dose bactrim today and transition to prophylaxis (while on high dose steroids)   - may continue steroids for now while awaiting final pathology  - await pending BAL cultures, cytology, and fungal markers  - will tailor therapy with you as she improves            Anticipated  Disposition: pending improvement    Thank you for your consult. I will follow-up with patient. Please contact us if you have any additional questions.     Carol Tan MD  Transplant ID Attending  [phone]    Carol Tan MD  Infectious Disease  [facility]    Subjective:     Principal Problem:Acute hypoxemic respiratory failure    HPI: No notes on file  Interval History: Weaning from vent support slowly, no acute events. Afebrile. BAL cultures NGTD and 1/2 GMS stains negative (second will not return til Monday). KERMIT noted.    Review of Systems   Unable to perform ROS: Intubated     Objective:     Vital Signs (Most Recent):  Temp: 99.1 °F (37.3 °C) (04/13/18 1300)  Pulse: 86 (04/13/18 1440)  Resp: (!) 28 (04/13/18 1440)  BP: (!) 89/50 (04/13/18 1440)  SpO2: (!) 89 % (04/13/18 1440) Vital Signs (24h Range):  Temp:  [96.4 °F (35.8 °C)-99.1 °F (37.3 °C)] 99.1 °F (37.3 °C)  Pulse:  [] 86  Resp:  [25-38] 28  SpO2:  [86 %-99 %] 89 %  BP: ()/(50-58) 89/50  Arterial Line BP: ()/(38-53) 97/42     Weight: 71.5 kg (157 lb 10.1 oz)  Body mass index is 26.23 kg/m².    Estimated Creatinine Clearance: 31.8 mL/min (based on SCr of 1.4 mg/dL).    Physical Exam   Constitutional: She appears well-developed. No distress.   Intubated/sedated.   HENT:   Head: Atraumatic.   Mouth/Throat: Oropharynx is clear and moist. No oropharyngeal exudate.   Eyes: Conjunctivae and EOM are normal. Pupils are equal, round, and reactive to light. No scleral icterus.   Neck: Neck supple.   Cardiovascular: Normal rate and regular rhythm.  Exam reveals no friction rub.    No murmur heard.  Pulmonary/Chest: No respiratory distress. She has no wheezes. She has rales. She exhibits no tenderness.   On vent, 50% FiO2.   Abdominal: Soft. Bowel sounds are normal. She exhibits no distension. There is no tenderness. There is no rebound and no guarding.   Musculoskeletal: Normal range of motion. She exhibits no edema.    Lymphadenopathy:     She has no cervical adenopathy.   Neurological:   Intubated/sedated.   Skin: No rash noted. No erythema.       Significant Labs:   CBC:     Recent Labs  Lab 04/12/18 0318 04/13/18 0243   WBC 23.38* 31.44*   HGB 8.6* 8.4*   HCT 28.6* 26.1*   * 376*     CMP:     Recent Labs  Lab 04/12/18 0318 04/12/18  1446 04/12/18 2006 04/13/18 0243 04/13/18  0415   * 134* 136  --  132*   K 3.7 3.4* 3.9  --  4.4   CL 99 101 102  --  101   CO2 23 25 24  --  22*   * 185* 156*  --  154*   BUN 12 14 16  --  23   CREATININE 1.0 1.1 1.2  --  1.4   CALCIUM 8.1* 8.3* 8.5*  --  8.5*   PROT 5.9*  --   --  5.8*  --    ALBUMIN 1.7*  --   --  1.7*  --    BILITOT 0.5  --   --  0.4  --    ALKPHOS 92  --   --  87  --    AST 11  --   --  9*  --    ALT 20  --   --  17  --    ANIONGAP 11 8 10  --  9   EGFRNONAA 53.3* 47.5* 42.8*  --  35.5*       Significant Imaging: I have reviewed all pertinent imaging results/findings within the past 24 hours.     CT chest:  1. Progression of bilateral patchy and confluent pulmonary consolidation compatible with pneumonia less likely edema or noninfectious inflammation.  There is air noted throughout the esophagus.  2. Stable markedly enlarged right thyroid lobe with large hypodense nodule measuring 3.2 x 2.4 x 4.7 cm.  3. Right renal cyst.  4. Enlarged lobular uterus with calcifications compatible with fibroids.  5. Additional findings as above.     Microbiology:  4/5 blood cx negative  4/5 urine cx negative  4/10 C.diff negative  4/11 blood cx negative  4/11 BAL cx NGTD, path pending; 1/2 GMS negative

## 2018-04-13 NOTE — SUBJECTIVE & OBJECTIVE
Interval History: Weaning from vent support slowly, no acute events. Afebrile. BAL cultures NGTD and 1/2 GMS stains negative (second will not return til Monday). KERMIT noted.    Review of Systems   Unable to perform ROS: Intubated     Objective:     Vital Signs (Most Recent):  Temp: 99.1 °F (37.3 °C) (04/13/18 1300)  Pulse: 86 (04/13/18 1440)  Resp: (!) 28 (04/13/18 1440)  BP: (!) 89/50 (04/13/18 1440)  SpO2: (!) 89 % (04/13/18 1440) Vital Signs (24h Range):  Temp:  [96.4 °F (35.8 °C)-99.1 °F (37.3 °C)] 99.1 °F (37.3 °C)  Pulse:  [] 86  Resp:  [25-38] 28  SpO2:  [86 %-99 %] 89 %  BP: ()/(50-58) 89/50  Arterial Line BP: ()/(38-53) 97/42     Weight: 71.5 kg (157 lb 10.1 oz)  Body mass index is 26.23 kg/m².    Estimated Creatinine Clearance: 31.8 mL/min (based on SCr of 1.4 mg/dL).    Physical Exam   Constitutional: She appears well-developed. No distress.   Intubated/sedated.   HENT:   Head: Atraumatic.   Mouth/Throat: Oropharynx is clear and moist. No oropharyngeal exudate.   Eyes: Conjunctivae and EOM are normal. Pupils are equal, round, and reactive to light. No scleral icterus.   Neck: Neck supple.   Cardiovascular: Normal rate and regular rhythm.  Exam reveals no friction rub.    No murmur heard.  Pulmonary/Chest: No respiratory distress. She has no wheezes. She has rales. She exhibits no tenderness.   On vent, 50% FiO2.   Abdominal: Soft. Bowel sounds are normal. She exhibits no distension. There is no tenderness. There is no rebound and no guarding.   Musculoskeletal: Normal range of motion. She exhibits no edema.   Lymphadenopathy:     She has no cervical adenopathy.   Neurological:   Intubated/sedated.   Skin: No rash noted. No erythema.       Significant Labs:   CBC:     Recent Labs  Lab 04/12/18 0318 04/13/18  0243   WBC 23.38* 31.44*   HGB 8.6* 8.4*   HCT 28.6* 26.1*   * 376*     CMP:     Recent Labs  Lab 04/12/18 0318 04/12/18  1446 04/12/18 2006 04/13/18  0243 04/13/18  0415    * 134* 136  --  132*   K 3.7 3.4* 3.9  --  4.4   CL 99 101 102  --  101   CO2 23 25 24  --  22*   * 185* 156*  --  154*   BUN 12 14 16  --  23   CREATININE 1.0 1.1 1.2  --  1.4   CALCIUM 8.1* 8.3* 8.5*  --  8.5*   PROT 5.9*  --   --  5.8*  --    ALBUMIN 1.7*  --   --  1.7*  --    BILITOT 0.5  --   --  0.4  --    ALKPHOS 92  --   --  87  --    AST 11  --   --  9*  --    ALT 20  --   --  17  --    ANIONGAP 11 8 10  --  9   EGFRNONAA 53.3* 47.5* 42.8*  --  35.5*       Significant Imaging: I have reviewed all pertinent imaging results/findings within the past 24 hours.     CT chest:  1. Progression of bilateral patchy and confluent pulmonary consolidation compatible with pneumonia less likely edema or noninfectious inflammation.  There is air noted throughout the esophagus.  2. Stable markedly enlarged right thyroid lobe with large hypodense nodule measuring 3.2 x 2.4 x 4.7 cm.  3. Right renal cyst.  4. Enlarged lobular uterus with calcifications compatible with fibroids.  5. Additional findings as above.     Microbiology:  4/5 blood cx negative  4/5 urine cx negative  4/10 C.diff negative  4/11 blood cx negative  4/11 BAL cx NGTD, path pending; 1/2 GMS negative

## 2018-04-13 NOTE — PT/OT/SLP RE-EVAL
Occupational Therapy   Re-evaluation    Name: Selma Lux MD  MRN: [id number]  Admitting Diagnosis:  Acute hypoxemic respiratory failure     Pt was t/f to ICU 4/11/18 after being found tachypneic on floor. Pt was placed on non-re breather then subsequently intubated upon arrival to ICU.     Recommendations:     Discharge Recommendations:  TBD      History:     Past Medical History:   Diagnosis Date    Anemia     Arthritis     Hashimoto's disease     Hypertension     IGT (impaired glucose tolerance) 1/12/2016    Joint pain     Multinodular goiter 1/12/2016    Stroke        Past Surgical History:   Procedure Laterality Date    CATARACT EXTRACTION      COLONOSCOPY N/A 9/29/2015    Procedure: COLONOSCOPY;  Surgeon: FIDELINA Sanchez MD;  Location: [facility] ([address] [place] [address]);  Service: Endoscopy;  Laterality: N/A;    EYE SURGERY      JOINT REPLACEMENT      right knee    KNEE SURGERY Left 12/31/2013    TKR    left parotidectomy      mixed tumor    SALIVARY GLAND SURGERY      TONSILLECTOMY         Subjective     Pt unable to verbalize due to ETT in place.   Communicated with: nsg prior to session.  Pain/Comfort:  · Pain Rating 1: 0/10    Objective:     Patient found supine in bed with B UE wrist restraints in place. :      General Precautions: Standard, fall   Orthopedic Precautions:N/A       Occupational Performance:    Pt failed MOVE screen this date; thus, supine ROM appropriate only this date.     TOTAL A for ADL skills this date      Cognitive/Visual Perceptual:  Pt attempts to open eyes in response to auditory stimulation.  Pt able to follow some simple commands by squeezing left hand and moving LE's.    Physical Exam:    Minimal/moderate edema noted distal UE's.  No  noted right hand and poor  left hand.  1-2/5 UE strength noted but difficulty to assess due to lines and impaired alertness/cognition.      Patient left supine with all lines intact, call button in reach, restraints reapplied at  end of session and nsg present    [facility] 6 Click:  [facility] Total Score: 6    Treatment & Education:  Pt failed MOVE screen, but nsg approved pt for ROM. Fly not present in room. VSS throughout session, but oxygenation saturation remaining 88-91% during activity.  Pt attempting to participate with UE/LE AA/PROM exs which were provided all available planes with slow prolonged stretching at end ranges.  Orientation provided to pt as well as education re: OT POC, ROM and positioning.  OT placed towel rolls in B hands to allow for improved positioning of fingers and wrist in bed.   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   Performance deficits affecting function are weakness, impaired functional mobilty, gait instability, impaired balance, impaired self care skills, decreased lower extremity function, decreased upper extremity function, impaired coordination, impaired fine motor, decreased coordination, impaired endurance.  Reeval completed this date with goals/POC updated this date to reflect pt's current medical status.    ROM/positioning completed this date due to fail MOVE screen. VSS and pt attempting to participate in session.      Rehab Prognosis:  Good ; patient would benefit from acute skilled OT services to address these deficits and reach maximum level of function.         Plan:     Patient to be seen 3 x/week to address the above listed problems via self-care/home management, therapeutic activities, therapeutic exercises  · Plan of Care Expires: 05/06/18  · Plan of Care Reviewed with: patient    This Plan of care has been discussed with the patient who was involved in its development and understands and is in agreement with the identified goals and treatment plan    GOALS:    Occupational Therapy Goals        Problem: Occupational Therapy Goal    Goal Priority Disciplines Outcome Interventions   Occupational  Therapy Goal     OT, PT/OT Ongoing (interventions implemented as appropriate)    Description:  Goals to be met by: 4/20/18    Patient will increase functional independence with ADLs by performing:  Pt to follow all simple commands to allow for improved participation and engagement with ADL skills.  Pt to perform basic g/h skills in sitting with MIN A  Pt/fly to be independent with ROM/positioning skills.  Pt to tolerate sitting EOB with Fair balance in prep for furher ADL and transfer training.                            Time Tracking:     OT Date of Treatment: 04/13/18  OT Start Time: 0845  OT Stop Time: 0910  OT Total Time (min): 25 min    Billable Minutes:Re-eval 10  Therapeutic Exercise 15    AYAD Hernandez  4/13/2018

## 2018-04-13 NOTE — ASSESSMENT & PLAN NOTE
- Neurologically intact prior to intubation.  - Medication-induced given need for sedation and opiates s/p intubation and need for synchrony with ventilator.  - Weaning sedation as tolerated.

## 2018-04-13 NOTE — ASSESSMENT & PLAN NOTE
- Likely secondary to pneumonia with iatrogenic component given amount of sedation need to keep the patient synchronous with ventilator.  - Holding off 30 mL/kg bolus given degree of hypoxemia, and the fact that prior to intubation patient was not in shock.  - Started on norepinephrine and titrating to MAP >65; wean as tolerated.  - Likely will be off pressors once sedation is turned off completely.  - Continue antibiotics.

## 2018-04-13 NOTE — TELEPHONE ENCOUNTER
----- Message from Triny Yang MA sent at 4/12/2018  4:52 PM CDT -----  Vivian,   Let me know if you are able to schedule patient follow up with dr. Tobias. Thank you!  -J  ----- Message -----  From: Branden Rubio MD  Sent: 4/11/2018   6:00 PM  To: Triny Yang MA        ----- Message -----  From: Susan Tobias MD  Sent: 4/4/2018   9:09 AM  To: Branden Rubio MD    Have her call my office and speak to Vivian or Kelsi. They can accommodate her this month. Thanks Branden.  ----- Message -----  From: Branden Rubio MD  Sent: 3/19/2018   6:59 AM  To: Susan Tobias MD    Hi Dr. Tobias,    I saw Dr. Lux in my clinic last week for hemicmelrea.  She is improving and requested that I send a message to you so that she could have a follow-up appointment scheduled with you to check in.    All the best,  Branden

## 2018-04-14 LAB
ALBUMIN SERPL BCP-MCNC: 1.7 G/DL
ALLENS TEST: ABNORMAL
ALP SERPL-CCNC: 107 U/L
ALT SERPL W/O P-5'-P-CCNC: 19 U/L
ANION GAP SERPL CALC-SCNC: 8 MMOL/L
ANION GAP SERPL CALC-SCNC: 9 MMOL/L
ANISOCYTOSIS BLD QL SMEAR: SLIGHT
AST SERPL-CCNC: 23 U/L
BACTERIA SPEC AEROBE CULT: NO GROWTH
BASOPHILS # BLD AUTO: 0.04 K/UL
BASOPHILS NFR BLD: 0.1 %
BILIRUB DIRECT SERPL-MCNC: 0.1 MG/DL
BILIRUB SERPL-MCNC: 0.2 MG/DL
BLASTOMYCES AG INTERP: NEGATIVE
BLASTOMYCES AG RESULT: NORMAL NG/ML
BLASTOMYCES AG SPECIMEN: NORMAL
BUN SERPL-MCNC: 37 MG/DL
BUN SERPL-MCNC: 48 MG/DL
BURR CELLS BLD QL SMEAR: ABNORMAL
CALCIUM SERPL-MCNC: 8.2 MG/DL
CALCIUM SERPL-MCNC: 8.2 MG/DL
CHLORIDE SERPL-SCNC: 101 MMOL/L
CHLORIDE SERPL-SCNC: 102 MMOL/L
CO2 SERPL-SCNC: 23 MMOL/L
CO2 SERPL-SCNC: 23 MMOL/L
CREAT SERPL-MCNC: 1.9 MG/DL
CREAT SERPL-MCNC: 1.9 MG/DL
CREAT UR-MCNC: 74 MG/DL
DELSYS: ABNORMAL
DIFFERENTIAL METHOD: ABNORMAL
EOSINOPHIL # BLD AUTO: 0 K/UL
EOSINOPHIL NFR BLD: 0 %
EOSINOPHIL URNS QL WRIGHT STN: NORMAL
ERYTHROCYTE [DISTWIDTH] IN BLOOD BY AUTOMATED COUNT: 16.3 %
ERYTHROCYTE [SEDIMENTATION RATE] IN BLOOD BY WESTERGREN METHOD: 25 MM/H
ERYTHROCYTE [SEDIMENTATION RATE] IN BLOOD BY WESTERGREN METHOD: 30 MM/H
ERYTHROCYTE [SEDIMENTATION RATE] IN BLOOD BY WESTERGREN METHOD: 32 MM/H
EST. GFR  (AFRICAN AMERICAN): 28.3 ML/MIN/1.73 M^2
EST. GFR  (AFRICAN AMERICAN): 28.3 ML/MIN/1.73 M^2
EST. GFR  (NON AFRICAN AMERICAN): 24.5 ML/MIN/1.73 M^2
EST. GFR  (NON AFRICAN AMERICAN): 24.5 ML/MIN/1.73 M^2
FIO2: 65
FIO2: 70
GLUCOSE SERPL-MCNC: 133 MG/DL
GLUCOSE SERPL-MCNC: 144 MG/DL
GRAM STN SPEC: NORMAL
HCO3 UR-SCNC: 13.9 MMOL/L (ref 24–28)
HCO3 UR-SCNC: 25.1 MMOL/L (ref 24–28)
HCO3 UR-SCNC: 26.1 MMOL/L (ref 24–28)
HCO3 UR-SCNC: 27.7 MMOL/L (ref 24–28)
HCO3 UR-SCNC: 27.8 MMOL/L (ref 24–28)
HCT VFR BLD AUTO: 26.2 %
HGB BLD-MCNC: 8.1 G/DL
HYPOCHROMIA BLD QL SMEAR: ABNORMAL
IMM GRANULOCYTES # BLD AUTO: 0.46 K/UL
IMM GRANULOCYTES NFR BLD AUTO: 1.6 %
INR PPP: 1.1
LYMPHOCYTES # BLD AUTO: 0.7 K/UL
LYMPHOCYTES NFR BLD: 2.4 %
MAGNESIUM SERPL-MCNC: 2.7 MG/DL
MCH RBC QN AUTO: 23.6 PG
MCHC RBC AUTO-ENTMCNC: 30.9 G/DL
MCV RBC AUTO: 76 FL
MIN VOL: 10.2
MIN VOL: 11.8
MODE: ABNORMAL
MONOCYTES # BLD AUTO: 2.5 K/UL
MONOCYTES NFR BLD: 8.4 %
NEUTROPHILS # BLD AUTO: 25.9 K/UL
NEUTROPHILS NFR BLD: 87.5 %
NRBC BLD-RTO: 0 /100 WBC
OVALOCYTES BLD QL SMEAR: ABNORMAL
PCO2 BLDA: 31.3 MMHG (ref 35–45)
PCO2 BLDA: 55.3 MMHG (ref 35–45)
PCO2 BLDA: 55.4 MMHG (ref 35–45)
PCO2 BLDA: 58.6 MMHG (ref 35–45)
PCO2 BLDA: 60.9 MMHG (ref 35–45)
PEEP: 8
PH SMN: 7.26 [PH] (ref 7.35–7.45)
PH SMN: 7.28 [PH] (ref 7.35–7.45)
PH SMN: 7.28 [PH] (ref 7.35–7.45)
PHOSPHATE SERPL-MCNC: 5.7 MG/DL
PIP: 32
PIP: 37
PLATELET # BLD AUTO: 424 K/UL
PLATELET BLD QL SMEAR: ABNORMAL
PMV BLD AUTO: 10.4 FL
PO2 BLDA: 61 MMHG (ref 80–100)
PO2 BLDA: 66 MMHG (ref 80–100)
PO2 BLDA: 67 MMHG (ref 80–100)
PO2 BLDA: 75 MMHG (ref 80–100)
PO2 BLDA: 79 MMHG (ref 80–100)
POC BE: -1 MMOL/L
POC BE: -13 MMOL/L
POC BE: -2 MMOL/L
POC BE: 1 MMOL/L
POC BE: 1 MMOL/L
POC SATURATED O2: 87 % (ref 95–100)
POC SATURATED O2: 89 % (ref 95–100)
POC SATURATED O2: 90 % (ref 95–100)
POC SATURATED O2: 92 % (ref 95–100)
POC SATURATED O2: 94 % (ref 95–100)
POC TCO2: 15 MMOL/L (ref 23–27)
POC TCO2: 27 MMOL/L (ref 23–27)
POC TCO2: 28 MMOL/L (ref 23–27)
POC TCO2: 29 MMOL/L (ref 23–27)
POC TCO2: 30 MMOL/L (ref 23–27)
POCT GLUCOSE: 140 MG/DL (ref 70–110)
POCT GLUCOSE: 140 MG/DL (ref 70–110)
POCT GLUCOSE: 142 MG/DL (ref 70–110)
POCT GLUCOSE: 144 MG/DL (ref 70–110)
POCT GLUCOSE: 147 MG/DL (ref 70–110)
POCT GLUCOSE: 163 MG/DL (ref 70–110)
POIKILOCYTOSIS BLD QL SMEAR: SLIGHT
POLYCHROMASIA BLD QL SMEAR: ABNORMAL
POTASSIUM SERPL-SCNC: 5.3 MMOL/L
POTASSIUM SERPL-SCNC: 5.6 MMOL/L
PROT SERPL-MCNC: 6.1 G/DL
PROTHROMBIN TIME: 11.7 SEC
PROVIDER CREDENTIALS: ABNORMAL
PROVIDER NOTIFIED: ABNORMAL
RBC # BLD AUTO: 3.43 M/UL
SAMPLE: ABNORMAL
SITE: ABNORMAL
SODIUM SERPL-SCNC: 133 MMOL/L
SODIUM SERPL-SCNC: 133 MMOL/L
SODIUM UR-SCNC: <20 MMOL/L
SP02: 87
SP02: 95
UUN UR-MCNC: 674 MG/DL
VANCOMYCIN SERPL-MCNC: <1.1 UG/ML
VT: 350
WBC # BLD AUTO: 29.6 K/UL

## 2018-04-14 PROCEDURE — 63600175 PHARM REV CODE 636 W HCPCS: Performed by: INTERNAL MEDICINE

## 2018-04-14 PROCEDURE — 83735 ASSAY OF MAGNESIUM: CPT

## 2018-04-14 PROCEDURE — 85025 COMPLETE CBC W/AUTO DIFF WBC: CPT

## 2018-04-14 PROCEDURE — 93010 ELECTROCARDIOGRAM REPORT: CPT | Mod: ,,, | Performed by: INTERNAL MEDICINE

## 2018-04-14 PROCEDURE — 25000242 PHARM REV CODE 250 ALT 637 W/ HCPCS: Performed by: INTERNAL MEDICINE

## 2018-04-14 PROCEDURE — 37799 UNLISTED PX VASCULAR SURGERY: CPT

## 2018-04-14 PROCEDURE — 94761 N-INVAS EAR/PLS OXIMETRY MLT: CPT

## 2018-04-14 PROCEDURE — 63600175 PHARM REV CODE 636 W HCPCS: Performed by: NURSE PRACTITIONER

## 2018-04-14 PROCEDURE — 80076 HEPATIC FUNCTION PANEL: CPT

## 2018-04-14 PROCEDURE — 94640 AIRWAY INHALATION TREATMENT: CPT

## 2018-04-14 PROCEDURE — 27000221 HC OXYGEN, UP TO 24 HOURS

## 2018-04-14 PROCEDURE — 99232 SBSQ HOSP IP/OBS MODERATE 35: CPT | Mod: ,,, | Performed by: INTERNAL MEDICINE

## 2018-04-14 PROCEDURE — 25000003 PHARM REV CODE 250: Performed by: NURSE PRACTITIONER

## 2018-04-14 PROCEDURE — 94003 VENT MGMT INPAT SUBQ DAY: CPT

## 2018-04-14 PROCEDURE — 63600175 PHARM REV CODE 636 W HCPCS: Performed by: GENERAL ACUTE CARE HOSPITAL

## 2018-04-14 PROCEDURE — 99291 CRITICAL CARE FIRST HOUR: CPT | Mod: ,,, | Performed by: NURSE PRACTITIONER

## 2018-04-14 PROCEDURE — 99900035 HC TECH TIME PER 15 MIN (STAT)

## 2018-04-14 PROCEDURE — 82803 BLOOD GASES ANY COMBINATION: CPT

## 2018-04-14 PROCEDURE — 84300 ASSAY OF URINE SODIUM: CPT

## 2018-04-14 PROCEDURE — 82570 ASSAY OF URINE CREATININE: CPT

## 2018-04-14 PROCEDURE — 99900026 HC AIRWAY MAINTENANCE (STAT)

## 2018-04-14 PROCEDURE — 87205 SMEAR GRAM STAIN: CPT

## 2018-04-14 PROCEDURE — 85610 PROTHROMBIN TIME: CPT

## 2018-04-14 PROCEDURE — 80048 BASIC METABOLIC PNL TOTAL CA: CPT

## 2018-04-14 PROCEDURE — 25000003 PHARM REV CODE 250: Performed by: INTERNAL MEDICINE

## 2018-04-14 PROCEDURE — 84540 ASSAY OF URINE/UREA-N: CPT

## 2018-04-14 PROCEDURE — 84100 ASSAY OF PHOSPHORUS: CPT

## 2018-04-14 PROCEDURE — 20000000 HC ICU ROOM

## 2018-04-14 PROCEDURE — 80202 ASSAY OF VANCOMYCIN: CPT

## 2018-04-14 PROCEDURE — 36600 WITHDRAWAL OF ARTERIAL BLOOD: CPT

## 2018-04-14 PROCEDURE — 25000003 PHARM REV CODE 250: Performed by: STUDENT IN AN ORGANIZED HEALTH CARE EDUCATION/TRAINING PROGRAM

## 2018-04-14 PROCEDURE — 93005 ELECTROCARDIOGRAM TRACING: CPT

## 2018-04-14 RX ORDER — SULFAMETHOXAZOLE AND TRIMETHOPRIM 800; 160 MG/1; MG/1
1 TABLET ORAL
Status: DISCONTINUED | OUTPATIENT
Start: 2018-04-16 | End: 2018-04-16

## 2018-04-14 RX ORDER — FUROSEMIDE 10 MG/ML
20 INJECTION INTRAMUSCULAR; INTRAVENOUS ONCE
Status: COMPLETED | OUTPATIENT
Start: 2018-04-14 | End: 2018-04-14

## 2018-04-14 RX ORDER — VANCOMYCIN/0.9 % SOD CHLORIDE 1 G/100 ML
1000 PLASTIC BAG, INJECTION (ML) INTRAVENOUS ONCE
Status: COMPLETED | OUTPATIENT
Start: 2018-04-14 | End: 2018-04-14

## 2018-04-14 RX ADMIN — METHYLPREDNISOLONE SODIUM SUCCINATE 80 MG: 125 INJECTION, POWDER, FOR SOLUTION INTRAMUSCULAR; INTRAVENOUS at 09:04

## 2018-04-14 RX ADMIN — IPRATROPIUM BROMIDE AND ALBUTEROL SULFATE 3 ML: .5; 3 SOLUTION RESPIRATORY (INHALATION) at 11:04

## 2018-04-14 RX ADMIN — MINERAL OIL AND WHITE PETROLATUM: 150; 830 OINTMENT OPHTHALMIC at 01:04

## 2018-04-14 RX ADMIN — Medication 0.03 MCG/KG/MIN: at 10:04

## 2018-04-14 RX ADMIN — IPRATROPIUM BROMIDE AND ALBUTEROL SULFATE 3 ML: .5; 3 SOLUTION RESPIRATORY (INHALATION) at 03:04

## 2018-04-14 RX ADMIN — POLYETHYLENE GLYCOL 3350 17 G: 17 POWDER, FOR SOLUTION ORAL at 09:04

## 2018-04-14 RX ADMIN — INSULIN ASPART 3 UNITS: 100 INJECTION, SOLUTION INTRAVENOUS; SUBCUTANEOUS at 12:04

## 2018-04-14 RX ADMIN — PROPOFOL 30 MCG/KG/MIN: 10 INJECTION, EMULSION INTRAVENOUS at 11:04

## 2018-04-14 RX ADMIN — IPRATROPIUM BROMIDE AND ALBUTEROL SULFATE 3 ML: .5; 3 SOLUTION RESPIRATORY (INHALATION) at 07:04

## 2018-04-14 RX ADMIN — MINERAL OIL AND WHITE PETROLATUM: 150; 830 OINTMENT OPHTHALMIC at 09:04

## 2018-04-14 RX ADMIN — INSULIN ASPART 3 UNITS: 100 INJECTION, SOLUTION INTRAVENOUS; SUBCUTANEOUS at 07:04

## 2018-04-14 RX ADMIN — INSULIN ASPART 3 UNITS: 100 INJECTION, SOLUTION INTRAVENOUS; SUBCUTANEOUS at 04:04

## 2018-04-14 RX ADMIN — PROPOFOL 35 MCG/KG/MIN: 10 INJECTION, EMULSION INTRAVENOUS at 03:04

## 2018-04-14 RX ADMIN — PIPERACILLIN AND TAZOBACTAM 4.5 G: 4; .5 INJECTION, POWDER, LYOPHILIZED, FOR SOLUTION INTRAVENOUS; PARENTERAL at 02:04

## 2018-04-14 RX ADMIN — PANTOPRAZOLE SODIUM 40 MG: 40 GRANULE, DELAYED RELEASE ORAL at 09:04

## 2018-04-14 RX ADMIN — ENOXAPARIN SODIUM 40 MG: 100 INJECTION SUBCUTANEOUS at 04:04

## 2018-04-14 RX ADMIN — Medication 125 MCG/HR: at 06:04

## 2018-04-14 RX ADMIN — CLOPIDOGREL 75 MG: 75 TABLET, FILM COATED ORAL at 09:04

## 2018-04-14 RX ADMIN — SODIUM POLYSTYRENE SULFONATE 30 G: 15 SUSPENSION ORAL; RECTAL at 06:04

## 2018-04-14 RX ADMIN — CHLORHEXIDINE GLUCONATE 15 ML: 1.2 RINSE ORAL at 09:04

## 2018-04-14 RX ADMIN — STANDARDIZED SENNA CONCENTRATE AND DOCUSATE SODIUM 1 TABLET: 8.6; 5 TABLET, FILM COATED ORAL at 09:04

## 2018-04-14 RX ADMIN — PIPERACILLIN AND TAZOBACTAM 4.5 G: 4; .5 INJECTION, POWDER, LYOPHILIZED, FOR SOLUTION INTRAVENOUS; PARENTERAL at 06:04

## 2018-04-14 RX ADMIN — FUROSEMIDE 20 MG: 10 INJECTION, SOLUTION INTRAMUSCULAR; INTRAVENOUS at 10:04

## 2018-04-14 RX ADMIN — PROPOFOL 40 MCG/KG/MIN: 10 INJECTION, EMULSION INTRAVENOUS at 06:04

## 2018-04-14 RX ADMIN — Medication 1 G: at 10:04

## 2018-04-14 RX ADMIN — MINERAL OIL AND WHITE PETROLATUM: 150; 830 OINTMENT OPHTHALMIC at 06:04

## 2018-04-14 RX ADMIN — HYDROXYCHLOROQUINE SULFATE 400 MG: 200 TABLET, FILM COATED ORAL at 09:04

## 2018-04-14 RX ADMIN — PIPERACILLIN AND TAZOBACTAM 4.5 G: 4; .5 INJECTION, POWDER, LYOPHILIZED, FOR SOLUTION INTRAVENOUS; PARENTERAL at 11:04

## 2018-04-14 RX ADMIN — DEXTROSE 300 MG: 5 SOLUTION INTRAVENOUS at 09:04

## 2018-04-14 NOTE — ASSESSMENT & PLAN NOTE
--Likely steroid-induced.  --Started on insulin gtt; now transitioned to SQ insulin.  --Goal -180.

## 2018-04-14 NOTE — SUBJECTIVE & OBJECTIVE
Interval History: Patient stable but severely ill - still intubated - no new issuestoday     Review of Systems   Unable to perform ROS: Intubated     Objective:     Vital Signs (Most Recent):  Temp: 98.7 °F (37.1 °C) (04/14/18 1510)  Pulse: 85 (04/14/18 1600)  Resp: (!) 30 (04/14/18 1600)  BP: (!) 96/53 (04/14/18 1600)  SpO2: (!) 89 % (04/14/18 1600) Vital Signs (24h Range):  Temp:  [97.8 °F (36.6 °C)-99 °F (37.2 °C)] 98.7 °F (37.1 °C)  Pulse:  [81-95] 85  Resp:  [25-37] 30  SpO2:  [87 %-94 %] 89 %  BP: ()/(48-56) 96/53  Arterial Line BP: ()/(36-54) 85/36     Weight: 71.5 kg (157 lb 10.1 oz)  Body mass index is 26.23 kg/m².    Estimated Creatinine Clearance: 23.4 mL/min (A) (based on SCr of 1.9 mg/dL (H)).    Physical Exam  Constitutional: She appears well-developed. No distress.   Intubated/sedated.   HENT:   Head: Atraumatic.   Mouth/Throat: Oropharynx is clear and moist. No oropharyngeal exudate.   Eyes: Conjunctivae and EOM are normal. Pupils are equal, round, and reactive to light. No scleral icterus.   Neck: Neck supple.   Cardiovascular: Normal rate and regular rhythm.  Exam reveals no friction rub.    No murmur heard.  Pulmonary/Chest: No respiratory distress. She has no wheezes. She has rales. She exhibits no tenderness.   On vent, 50% FiO2.   Abdominal: Soft. Bowel sounds are normal. She exhibits no distension. There is no tenderness. There is no rebound and no guarding.   Musculoskeletal: Normal range of motion. She exhibits no edema.   Lymphadenopathy:     She has no cervical adenopathy.   Neurological:   Intubated/sedated.   Skin: No rash noted. No erythema.       Significant Labs: All pertinent labs within the past 24 hours have been reviewed.    Significant Imaging: I have reviewed all pertinent imaging results/findings within the past 24 hours.

## 2018-04-14 NOTE — ASSESSMENT & PLAN NOTE
--Suspect secondary to severe bilateral pneumonia in the setting of immunosuppression.  --Severely worsened over first week of admission despite CAP treatment.  --s/p emergent intubation on 4/11 upon MICU admission for severe hypoxemia and severe respiratory distress.  --Continue mechanical ventilation, wean support as tolerated.  --Bronchoscopy on 4/11 with wash; preliminary culture with few WBCs, no organisms.  --Antibiotics broadened to vancomycin, zosyn, bactrim, and posaconazole; appreciate ID assistance with management.  --Blood, sputum, and urine cultures negative.   --KOH prep negative, AFB stain with no acid fast bacilli noted, fungal culture pending  --Aspergillus, Histoplasma, and Cryptococcal antigen in blood is negative. Legionella and blasomyces negative.   --Cytology and viral panel pending.  --Continue antibiotics and IV steroids. Preliminary GMS stain negative, Bactrim (treatment dose) discontinued 4/13.  Remains on prophylaxis dose of Bactrim.    --P/F ratio worsened, on 70% Fio2 and peep 8 as she has not been peep responsive.   --CXR with mild improvement however continues to require significant oxygen.    --consider restarting paralytic and possible proning.   --overall positive 2 L, furosemide 20 mg IV ordered to maintain euvolemia.

## 2018-04-14 NOTE — ASSESSMENT & PLAN NOTE
--Likely secondary to pneumonia with iatrogenic component given amount of sedation need to keep the patient synchronous with ventilator.  --Started on norepinephrine and titrating to MAP >65; wean as tolerated..  --Continue antibiotics. Lactate wnl.

## 2018-04-14 NOTE — ASSESSMENT & PLAN NOTE
--sCr increased to 1.9, with baseline 0.9 on admission.  --Likely secondary to Bactrim as it affects the kidney ability to excrete Cr.  --follow urine studies.  --making adequate urine (0.6 ml/kg/hr)

## 2018-04-14 NOTE — ASSESSMENT & PLAN NOTE
--Appreciate Rheumatology assistance.  --Continue hydroxychloroquine.  --Hold home prednisone; switched to IV SoluMedrol 80mg Q12h.

## 2018-04-14 NOTE — ASSESSMENT & PLAN NOTE
--Remote lacunar infarcts in the right centrum semi-ovale, basal ganglia, and right tatyana per [facility] w/o contrast on 12/2017.  --Continue home clopidogrel.

## 2018-04-14 NOTE — PROGRESS NOTES
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 She was seen by her PCP on 4/2 for these complaints and received a prescription for duo nebs and tessalon perles. CXR performed on 4/2 noted bilateral basilar infiltrates. At that time, she denied fever, chills, sputum production, sick contacts. She was admitted to  and treated emprically for CAP with rocephin and azithromycin. She was also diuresed daily. However, her oxygen requirements slowly increased from 2 L NC to 4 L NC with also progressively worsening bilateral infiltrates on imaging. CTA performed on 4/9 negative for PE, however noted significant bilateral consolidations. On 4/11, her hypoxemia continued to worsen, requiring NRB mask, and she developed severe respiratory distress. She was transferred to the MICU and emergently intubated. Repeat CT chest performed on 4/11 demonstrated progressive worsening of bilateral peripheral infiltrates.    Hospital/ICU Course:  Admitted to MICU on 4/11, intubated emergently for severe hypoxemia and respiratory distress. ID was consulted for assistance in management given immunocompromised state. Patient paralyzed with nimbex following persistent dysynchrony with vent despite sedation. Abx broadened to vancomycin, zosyn, bactrim, and posaconazole. Norepinephrine initiated for BP support.  Bronchoscopy performed at bedside on 4/11.   Nimbex discontinued on 4/12. She continues to require significant oxygen support.     Interval History/Significant Events: No acute events overnight.  Remains on 70% Fio2, peep 8 and low dose norepinephrine infusion.    Review of Systems   Unable to perform ROS: Intubated     Objective:     Vital Signs (Most Recent):  Temp: 97.9 °F (36.6 °C) (04/14/18 1105)  Pulse: 82 (04/14/18 1510)  Resp: (!) 29 (04/14/18 1510)  BP: (!) 96/55 (04/14/18 1510)  SpO2: (!) 88 % (04/14/18 1510) Vital Signs (24h Range):  Temp:  [97.8 °F (36.6 °C)-99 °F (37.2 °C)] 97.9 °F (36.6 °C)  Pulse:  [81-95] 82  Resp:  [25-37] 29  SpO2:  [87 %-94 %] 88 %  BP: ()/(48-56) 96/55  Arterial Line BP: ()/(36-54) 85/36   Weight: 71.5 kg (157 lb 10.1 oz)  Body mass index is 26.23 kg/m².      Intake/Output Summary (Last 24 hours) at 04/14/18 1537  Last data filed at 04/14/18 1500   Gross per 24 hour   Intake          2723.57 ml   Output             1055 ml   Net          1668.57 ml       Physical Exam   Constitutional: She appears ill. No distress. She is intubated.   HENT:   Head: Normocephalic and atraumatic.   Eyes: Conjunctivae are normal. Right eye exhibits no discharge and no exudate. Left eye exhibits no discharge and no exudate. No scleral icterus. Right eye exhibits no nystagmus. Left eye exhibits no nystagmus.   Neck: No tracheal deviation present.   Cardiovascular: Normal rate, regular rhythm, normal heart sounds and intact distal pulses.  PMI is not displaced.  Exam reveals no gallop and no friction rub.    No murmur heard.  Pulses:       Radial pulses are 2+ on the right side, and 2+ on the left side.        Dorsalis pedis pulses are 2+ on the right side, and 2+ on the left side.   Warm extremities   Pulmonary/Chest: Effort normal. No accessory muscle usage. Tachypnea noted. She is intubated. No respiratory distress. She has no decreased breath sounds. She has no wheezes. She has no  rhonchi. She has rales in the right middle field, the right lower field, the left middle field and the left lower field.   Abdominal: Soft. Normal appearance and bowel sounds are normal. She exhibits no mass. There is no tenderness.   Genitourinary:   Genitourinary Comments: Hill catheter draining clear, yellow urine   Lymphadenopathy:     She has no cervical adenopathy.   Neurological: GCS eye subscore is 3. GCS verbal subscore is 1. GCS motor subscore is 6.   Sedation vacation performed.  Opens eyes to verbal request, moving all extremities to request (thumbs up and wiggling toes bilaterally). PERRL, no nystagmus or eye deviation.  + cough and gag reflex.   Skin: Skin is warm, dry and intact. She is not diaphoretic.   Nursing note and vitals reviewed.      Vents:  Vent Mode: A/C (04/14/18 1505)  Set Rate: 30 bmp (04/14/18 1505)  Vt Set: 350 mL (04/14/18 1505)  Pressure Support: 0 cmH20 (04/14/18 1505)  PEEP/CPAP: 8 cmH20 (04/14/18 1505)  Oxygen Concentration (%): 70 (04/14/18 1510)  Peak Airway Pressure: 35 cmH2O (04/14/18 1505)  Plateau Pressure: 30 cmH20 (04/14/18 1505)  Total Ve: 11.6 mL (04/14/18 1505)  F/VT Ratio<105 (RSBI): (!) 74.07 (04/14/18 1505)  Lines/Drains/Airways     Central Venous Catheter Line                 Percutaneous Central Line Insertion/Assessment - triple lumen  04/11/18 1513 right subclavian 3 days          Drain                 NG/OG Tube 04/11/18 1340 Left mouth 3 days         Urethral Catheter 04/11/18 1430 Double-lumen 16 Fr. 3 days          Airway                 Airway - Non-Surgical 04/11/18 1337 Endotracheal Tube 3 days          Arterial Line                 Arterial Line 04/11/18 1540 Right Radial 2 days          Peripheral Intravenous Line                 Peripheral IV - Single Lumen 04/11/18 1307 Right Antecubital 3 days              Significant Labs:    CBC/Anemia Profile:    Recent Labs  Lab 04/13/18  0243 04/14/18  0322   WBC 31.44* 29.60*   HGB 8.4* 8.1*   HCT 26.1* 26.2*    * 424*   MCV 75* 76*   RDW 15.9* 16.3*        Chemistries:    Recent Labs  Lab 04/13/18  0243 04/13/18  0415 04/13/18  1524 04/14/18  0322   NA  --  132* 133* 133*   K  --  4.4 4.6 5.3*   CL  --  101 101 101   CO2  --  22* 22* 23   BUN  --  23 29* 37*   CREATININE  --  1.4 1.7* 1.9*   CALCIUM  --  8.5* 8.3* 8.2*   ALBUMIN 1.7*  --   --  1.7*   PROT 5.8*  --   --  6.1   BILITOT 0.4  --   --  0.2   ALKPHOS 87  --   --  107   ALT 17  --   --  19   AST 9*  --   --  23   MG  --  2.3  --  2.7*   PHOS  --  4.3  --  5.7*       All pertinent labs within the past 24 hours have been reviewed.    Significant Imaging:  I have reviewed and interpreted all pertinent imaging results/findings within the past 24 hours.    Assessment/Plan:     Neuro   Encephalopathy, metabolic    --suspect secondary to sedatives for ventilator dyssynchrony and critical illness.    --non-focal on exam          Cerebrovascular accident (CVA)    --Remote lacunar infarcts in the right centrum semi-ovale, basal ganglia, and right tatyana per CTH w/o contrast on 12/2017.  --Continue home clopidogrel.        Pulmonary   * Acute hypoxemic respiratory failure    --Suspect secondary to severe bilateral pneumonia in the setting of immunosuppression.  --Severely worsened over first week of admission despite CAP treatment.  --s/p emergent intubation on 4/11 upon MICU admission for severe hypoxemia and severe respiratory distress.  --Continue mechanical ventilation, wean support as tolerated.  --Bronchoscopy on 4/11 with wash; preliminary culture with few WBCs, no organisms.  --Antibiotics broadened to vancomycin, zosyn, bactrim, and posaconazole; appreciate ID assistance with management.  --Blood, sputum, and urine cultures negative.   --KOH prep negative, AFB stain with no acid fast bacilli noted, fungal culture pending  --Aspergillus, Histoplasma, and Cryptococcal antigen in blood is negative. Legionella and blasomyces negative.   --Cytology and viral  "panel pending.  --Continue antibiotics and IV steroids. Preliminary GMS stain negative, Bactrim (treatment dose) discontinued 4/13.  Remains on prophylaxis dose of Bactrim.    --P/F ratio worsened, on 70% Fio2 and peep 8 as she has not been peep responsive.   --CXR with mild improvement however continues to require significant oxygen.    --consider restarting paralytic and possible proning.   --overall positive 2 L, furosemide 20 mg IV ordered to maintain euvolemia.         ARDS (adult respiratory distress syndrome)    - See "Acute hypoxemic respiratory failure".        Pneumonia of both upper lobes due to infectious organism    - See "Acute hypoxemic respiratory failure".        Cardiac/Vascular   Hypertension, essential    - Holding home medications in setting of shock.        ID   Septic shock    --Likely secondary to pneumonia with iatrogenic component given amount of sedation need to keep the patient synchronous with ventilator.  --Started on norepinephrine and titrating to MAP >65; wean as tolerated..  --Continue antibiotics. Lactate wnl.        Endocrine   On tube feeding diet    --appreciate nutrition assistance.  --advance enteral feedings as tolerated.         Hyperglycemia    --Likely steroid-induced.  --Started on insulin gtt; now transitioned to SQ insulin.  --Goal -180.        Thyroid enlargement    --History of Hashimoto's thyroiditis.  --Intrathoracic extension on CT.  --TSH and free T4 wnl.          GI   PUD (peptic ulcer disease)    --Continue PPI.        Orthopedic   Seropositive rheumatoid arthritis of multiple sites    --Appreciate Rheumatology assistance.  --Continue hydroxychloroquine.  --Hold home prednisone; switched to IV SoluMedrol 80mg Q12h.        Other   Elevated serum creatinine    --sCr increased to 1.9, with baseline 0.9 on admission.  --Likely secondary to Bactrim as it affects the kidney ability to excrete Cr.  --follow urine studies.  --making adequate urine (0.6 ml/kg/hr)   " "     Long term (current) use of systemic steroids    - See "Seropositive rheumatoid arthritis of multiple sites".           Critical Care Daily Checklist:    A: Awake: RASS Goal/Actual Goal: RASS Goal: -4-->deep sedation  Actual: Monzon Agitation Sedation Scale (RASS): Deep sedation   B: Spontaneous Breathing Trial Performed?  contraindicated given oxygen requirements.    C: SAT & SBT Coordinated?                   D: Delirium: CAM-ICU Overall CAM-ICU: Positive   E: Early Mobility Performed? Yes   F: Feeding Goal: Goals: Pt to receive >85% EEN and EPN  Status: Nutrition Goal Status: new   Current Diet Order   Procedures    Diet NPO      AS: Analgesia/Sedation Fentanyl + propofol   T: Thromboembolic Prophylaxis enoxaparin   H: HOB > 300 Yes   U: Stress Ulcer Prophylaxis (if needed) Famotidine.    G: Glucose Control Within goal   B: Bowel Function Stool Occurrence: 0   I: Indwelling Catheter (Lines & Hill) Necessity ETT 4/11  OG  Right radial arterial line  Urine catheter  Right IJ TLC   D: De-escalation of Antimicrobials/Pharmacotherapies See above regimen    Plan for the day/ETD Supportive care    Code Status:  Family/Goals of Care: Full Code       Patient's daughter updated at bedside.     Discussed plan with Dr. Kirkpatrick.     Critical Care Time: 50 minutes  Critical secondary to Patient has a condition that poses threat to life and bodily function: Acute Respiratory Failure/ARDS requiring mechanical ventilation, shock on vasopressors.       Critical care was time spent personally by me on the following activities: development of treatment plan with patient or surrogate and bedside caregivers, discussions with consultants, evaluation of patient's response to treatment, examination of patient, ordering and performing treatments and interventions, ordering and review of laboratory studies, ordering and review of radiographic studies, pulse oximetry, re-evaluation of patient's condition. This critical care time " did not overlap with that of any other provider or involve time for any procedures.     Jonna Farah NP  Critical Care Medicine  [facility]

## 2018-04-14 NOTE — PROGRESS NOTES
[facility]  Infectious Disease  Progress Note    Patient Name: Selma Alonzo Lux MD  MRN: [id number]  Admission Date: 4/5/2018  Length of Stay: 9 days  Attending Physician: Patricia Kirkpatrick MD  Primary Care Provider: Bhargav Hirsch MD    Isolation Status: No active isolations  Assessment/Plan:      * Acute hypoxemic respiratory failure    83yo woman w/a history of prior CVA, RA (dx 2012; RF seropositive, erosive; on HCQ/prednisone 5mg and recently started humira first dose 3/22/2018), IBS, and Hasimotos thyroiditis (goiter) who was admitted on 4/5/2018 with 2 days of dry cough and progressive SOB due to multifocal PNA (with peripheral and basilar lesions noted on CT chest) that has progressively worsened despite CAP coverage x5 days, requiring intubation on 4/12 for hypoxic RF. She remains critically ill but has plateaued clinically on expanded coverage for all possibilities on differential of hypoxic RF (most notable for MDRO bacterial HCAP/HAP, IFI including Aspergillus and endemic fungi with PCP less likely given steroid dose, and  vs hypersensitivity pneumonitis). Course c/b KERMIT and given that PCP is low on the differential (given CT chest appearance and relatively low steroid dose of 5mg daily), would stop bactrim with 1 negative GMS stain already completed.    - would continue coverage for HCAP with vanc/zosyn while awaiting pending cultures (vanc trough goal 15) -- she has completed 5 days of atypical coverage with azithromycin already; assuming negative cultures at 72h, can stop vancomycin  - will continue posaconazole for coverage of the above mold species and await pending fungal markers  - would stop high dose bactrim today and transition to prophylaxis (while on high dose steroids)   - may continue steroids for now while awaiting final pathology  - await pending BAL cultures, cytology, and fungal markers  - will tailor therapy with you as she improves    No evidence of a staph  infection - ID recommends stopping vancomycin today  Keep on zosyn and posiconazole             Anticipated Disposition:     Thank you for your consult. I will follow-up with patient. Please contact us if you have any additional questions.    Jered Ford MD  Infectious Disease  [facility]    Subjective:     Principal Problem:Acute hypoxemic respiratory failure    HPI: No notes on file  Interval History: Patient stable but severely ill - still intubated - no new issuestoday     Review of Systems   Unable to perform ROS: Intubated     Objective:     Vital Signs (Most Recent):  Temp: 98.7 °F (37.1 °C) (04/14/18 1510)  Pulse: 85 (04/14/18 1600)  Resp: (!) 30 (04/14/18 1600)  BP: (!) 96/53 (04/14/18 1600)  SpO2: (!) 89 % (04/14/18 1600) Vital Signs (24h Range):  Temp:  [97.8 °F (36.6 °C)-99 °F (37.2 °C)] 98.7 °F (37.1 °C)  Pulse:  [81-95] 85  Resp:  [25-37] 30  SpO2:  [87 %-94 %] 89 %  BP: ()/(48-56) 96/53  Arterial Line BP: ()/(36-54) 85/36     Weight: 71.5 kg (157 lb 10.1 oz)  Body mass index is 26.23 kg/m².    Estimated Creatinine Clearance: 23.4 mL/min (A) (based on SCr of 1.9 mg/dL (H)).    Physical Exam  Constitutional: She appears well-developed. No distress.   Intubated/sedated.   HENT:   Head: Atraumatic.   Mouth/Throat: Oropharynx is clear and moist. No oropharyngeal exudate.   Eyes: Conjunctivae and EOM are normal. Pupils are equal, round, and reactive to light. No scleral icterus.   Neck: Neck supple.   Cardiovascular: Normal rate and regular rhythm.  Exam reveals no friction rub.    No murmur heard.  Pulmonary/Chest: No respiratory distress. She has no wheezes. She has rales. She exhibits no tenderness.   On vent, 50% FiO2.   Abdominal: Soft. Bowel sounds are normal. She exhibits no distension. There is no tenderness. There is no rebound and no guarding.   Musculoskeletal: Normal range of motion. She exhibits no edema.   Lymphadenopathy:     She has no cervical  adenopathy.   Neurological:   Intubated/sedated.   Skin: No rash noted. No erythema.       Significant Labs: All pertinent labs within the past 24 hours have been reviewed.    Significant Imaging: I have reviewed all pertinent imaging results/findings within the past 24 hours.

## 2018-04-14 NOTE — ASSESSMENT & PLAN NOTE
81yo woman w/a history of prior CVA, RA (dx 2012; RF seropositive, erosive; on HCQ/prednisone 5mg and recently started humira first dose 3/22/2018), IBS, and Hasimotos thyroiditis (goiter) who was admitted on 4/5/2018 with 2 days of dry cough and progressive SOB due to multifocal PNA (with peripheral and basilar lesions noted on CT chest) that has progressively worsened despite CAP coverage x5 days, requiring intubation on 4/12 for hypoxic RF. She remains critically ill but has plateaued clinically on expanded coverage for all possibilities on differential of hypoxic RF (most notable for MDRO bacterial HCAP/HAP, IFI including Aspergillus and endemic fungi with PCP less likely given steroid dose, and  vs hypersensitivity pneumonitis). Course c/b KERMIT and given that PCP is low on the differential (given CT chest appearance and relatively low steroid dose of 5mg daily), would stop bactrim with 1 negative GMS stain already completed.    - would continue coverage for HCAP with vanc/zosyn while awaiting pending cultures (vanc trough goal 15) -- she has completed 5 days of atypical coverage with azithromycin already; assuming negative cultures at 72h, can stop vancomycin  - will continue posaconazole for coverage of the above mold species and await pending fungal markers  - would stop high dose bactrim today and transition to prophylaxis (while on high dose steroids)   - may continue steroids for now while awaiting final pathology  - await pending BAL cultures, cytology, and fungal markers  - will tailor therapy with you as she improves    No evidence of a staph infection - ID recommends stopping vancomycin today  Keep on zosyn and posiconazole

## 2018-04-14 NOTE — PLAN OF CARE
Problem: Patient Care Overview  Goal: Plan of Care Review  Outcome: Ongoing (interventions implemented as appropriate)  No acute events throughout day. See vital signs and assessments in flowsheets. See below for updates on today's progress.     Pulmonary: remains ventilated no changes made to settings, ABGs now Q4H, O2 sats 88-91%    Cardiovascular: SR with HR 70-80s. Remains on low dose levo gtt to maintain MAP >65 (titrating per cuff pressure as a-line is not accurate)    Neurological: SAT performed this morning - once arousable pt was able to open eyes and follow commands. Keeping fully sedated to promote ventilator synchrony.     Gastrointestinal: OGT with tube feeds at goal rate of 40 with no residual. No BM.     Genitourinary: parikh catheter remains in place with output 30-50ml/hr. Catheter care performed.     Endocrine: accuchecks Q4H. Scheduled insulin given as ordered.     Integumentary/Other: skin intact. Complete bath given and linens changed. Turned Q2H. Heel boots in place.     Infusions: Levo, Prop, Fent    Patient progressing towards goals as tolerated, plan of care communicated and reviewed with Selma Lux MD and daughter. All concerns addressed. Will continue to monitor.

## 2018-04-14 NOTE — SUBJECTIVE & OBJECTIVE
Interval History/Significant Events: No acute events overnight.  Remains on 70% Fio2, peep 8 and low dose norepinephrine infusion.    Review of Systems   Unable to perform ROS: Intubated     Objective:     Vital Signs (Most Recent):  Temp: 97.9 °F (36.6 °C) (04/14/18 1105)  Pulse: 82 (04/14/18 1510)  Resp: (!) 29 (04/14/18 1510)  BP: (!) 96/55 (04/14/18 1510)  SpO2: (!) 88 % (04/14/18 1510) Vital Signs (24h Range):  Temp:  [97.8 °F (36.6 °C)-99 °F (37.2 °C)] 97.9 °F (36.6 °C)  Pulse:  [81-95] 82  Resp:  [25-37] 29  SpO2:  [87 %-94 %] 88 %  BP: ()/(48-56) 96/55  Arterial Line BP: ()/(36-54) 85/36   Weight: 71.5 kg (157 lb 10.1 oz)  Body mass index is 26.23 kg/m².      Intake/Output Summary (Last 24 hours) at 04/14/18 1537  Last data filed at 04/14/18 1500   Gross per 24 hour   Intake          2723.57 ml   Output             1055 ml   Net          1668.57 ml       Physical Exam   Constitutional: She appears ill. No distress. She is intubated.   HENT:   Head: Normocephalic and atraumatic.   Eyes: Conjunctivae are normal. Right eye exhibits no discharge and no exudate. Left eye exhibits no discharge and no exudate. No scleral icterus. Right eye exhibits no nystagmus. Left eye exhibits no nystagmus.   Neck: No tracheal deviation present.   Cardiovascular: Normal rate, regular rhythm, normal heart sounds and intact distal pulses.  PMI is not displaced.  Exam reveals no gallop and no friction rub.    No murmur heard.  Pulses:       Radial pulses are 2+ on the right side, and 2+ on the left side.        Dorsalis pedis pulses are 2+ on the right side, and 2+ on the left side.   Warm extremities   Pulmonary/Chest: Effort normal. No accessory muscle usage. Tachypnea noted. She is intubated. No respiratory distress. She has no decreased breath sounds. She has no wheezes. She has no rhonchi. She has rales in the right middle field, the right lower field, the left middle field and the left lower field.   Abdominal:  Soft. Normal appearance and bowel sounds are normal. She exhibits no mass. There is no tenderness.   Genitourinary:   Genitourinary Comments: Hill catheter draining clear, yellow urine   Lymphadenopathy:     She has no cervical adenopathy.   Neurological: GCS eye subscore is 3. GCS verbal subscore is 1. GCS motor subscore is 6.   Sedation vacation performed.  Opens eyes to verbal request, moving all extremities to request (thumbs up and wiggling toes bilaterally). PERRL, no nystagmus or eye deviation.  + cough and gag reflex.   Skin: Skin is warm, dry and intact. She is not diaphoretic.   Nursing note and vitals reviewed.      Vents:  Vent Mode: A/C (04/14/18 1505)  Set Rate: 30 bmp (04/14/18 1505)  Vt Set: 350 mL (04/14/18 1505)  Pressure Support: 0 cmH20 (04/14/18 1505)  PEEP/CPAP: 8 cmH20 (04/14/18 1505)  Oxygen Concentration (%): 70 (04/14/18 1510)  Peak Airway Pressure: 35 cmH2O (04/14/18 1505)  Plateau Pressure: 30 cmH20 (04/14/18 1505)  Total Ve: 11.6 mL (04/14/18 1505)  F/VT Ratio<105 (RSBI): (!) 74.07 (04/14/18 1505)  Lines/Drains/Airways     Central Venous Catheter Line                 Percutaneous Central Line Insertion/Assessment - triple lumen  04/11/18 1513 right subclavian 3 days          Drain                 NG/OG Tube 04/11/18 1340 Left mouth 3 days         Urethral Catheter 04/11/18 1430 Double-lumen 16 Fr. 3 days          Airway                 Airway - Non-Surgical 04/11/18 1337 Endotracheal Tube 3 days          Arterial Line                 Arterial Line 04/11/18 1540 Right Radial 2 days          Peripheral Intravenous Line                 Peripheral IV - Single Lumen 04/11/18 1307 Right Antecubital 3 days              Significant Labs:    CBC/Anemia Profile:    Recent Labs  Lab 04/13/18  0243 04/14/18  0322   WBC 31.44* 29.60*   HGB 8.4* 8.1*   HCT 26.1* 26.2*   * 424*   MCV 75* 76*   RDW 15.9* 16.3*        Chemistries:    Recent Labs  Lab 04/13/18  0243 04/13/18  0415 04/13/18  1524  04/14/18  0322   NA  --  132* 133* 133*   K  --  4.4 4.6 5.3*   CL  --  101 101 101   CO2  --  22* 22* 23   BUN  --  23 29* 37*   CREATININE  --  1.4 1.7* 1.9*   CALCIUM  --  8.5* 8.3* 8.2*   ALBUMIN 1.7*  --   --  1.7*   PROT 5.8*  --   --  6.1   BILITOT 0.4  --   --  0.2   ALKPHOS 87  --   --  107   ALT 17  --   --  19   AST 9*  --   --  23   MG  --  2.3  --  2.7*   PHOS  --  4.3  --  5.7*       All pertinent labs within the past 24 hours have been reviewed.    Significant Imaging:  I have reviewed and interpreted all pertinent imaging results/findings within the past 24 hours.

## 2018-04-14 NOTE — ASSESSMENT & PLAN NOTE
--suspect secondary to sedatives for ventilator dyssynchrony and critical illness.    --non-focal on exam

## 2018-04-15 PROBLEM — N17.9 AKI (ACUTE KIDNEY INJURY): Status: ACTIVE | Noted: 2018-04-15

## 2018-04-15 PROBLEM — E87.5 HYPERKALEMIA: Status: ACTIVE | Noted: 2018-04-15

## 2018-04-15 LAB
ALBUMIN SERPL BCP-MCNC: 1.6 G/DL
ALLENS TEST: ABNORMAL
ALP SERPL-CCNC: 92 U/L
ALT SERPL W/O P-5'-P-CCNC: 17 U/L
ANION GAP SERPL CALC-SCNC: 11 MMOL/L
ANION GAP SERPL CALC-SCNC: 9 MMOL/L
ANION GAP SERPL CALC-SCNC: 9 MMOL/L
AST SERPL-CCNC: 15 U/L
BASOPHILS # BLD AUTO: 0.02 K/UL
BASOPHILS NFR BLD: 0.1 %
BILIRUB DIRECT SERPL-MCNC: 0.2 MG/DL
BILIRUB SERPL-MCNC: 0.2 MG/DL
BILIRUB UR QL STRIP: NEGATIVE
BNP SERPL-MCNC: 153 PG/ML
BUN SERPL-MCNC: 2 MG/DL
BUN SERPL-MCNC: 59 MG/DL
BUN SERPL-MCNC: 67 MG/DL
CALCIUM SERPL-MCNC: 8 MG/DL
CALCIUM SERPL-MCNC: 8 MG/DL
CALCIUM SERPL-MCNC: 8.2 MG/DL
CHLORIDE SERPL-SCNC: 104 MMOL/L
CHLORIDE SERPL-SCNC: 105 MMOL/L
CHLORIDE SERPL-SCNC: 106 MMOL/L
CLARITY UR REFRACT.AUTO: ABNORMAL
CO2 SERPL-SCNC: 22 MMOL/L
CO2 SERPL-SCNC: 25 MMOL/L
CO2 SERPL-SCNC: 25 MMOL/L
COLOR UR AUTO: ABNORMAL
CREAT SERPL-MCNC: 1.9 MG/DL
CREAT SERPL-MCNC: 2 MG/DL
CREAT SERPL-MCNC: 2.1 MG/DL
CREAT UR-MCNC: 18 MG/DL
DELSYS: ABNORMAL
DIFFERENTIAL METHOD: ABNORMAL
EOSINOPHIL # BLD AUTO: 0 K/UL
EOSINOPHIL NFR BLD: 0 %
EOSINOPHIL URNS QL WRIGHT STN: NORMAL
ERYTHROCYTE [DISTWIDTH] IN BLOOD BY AUTOMATED COUNT: 16.7 %
ERYTHROCYTE [SEDIMENTATION RATE] IN BLOOD BY WESTERGREN METHOD: 30 MM/H
ERYTHROCYTE [SEDIMENTATION RATE] IN BLOOD BY WESTERGREN METHOD: 32 MM/H
ERYTHROCYTE [SEDIMENTATION RATE] IN BLOOD BY WESTERGREN METHOD: 32 MM/H
EST. GFR  (AFRICAN AMERICAN): 25.1 ML/MIN/1.73 M^2
EST. GFR  (AFRICAN AMERICAN): 26.6 ML/MIN/1.73 M^2
EST. GFR  (AFRICAN AMERICAN): 28.3 ML/MIN/1.73 M^2
EST. GFR  (NON AFRICAN AMERICAN): 21.8 ML/MIN/1.73 M^2
EST. GFR  (NON AFRICAN AMERICAN): 23.1 ML/MIN/1.73 M^2
EST. GFR  (NON AFRICAN AMERICAN): 24.5 ML/MIN/1.73 M^2
FIO2: 60
GLUCOSE SERPL-MCNC: 148 MG/DL
GLUCOSE SERPL-MCNC: 152 MG/DL
GLUCOSE SERPL-MCNC: 154 MG/DL
GLUCOSE UR QL STRIP: NEGATIVE
HCO3 UR-SCNC: 25.3 MMOL/L (ref 24–28)
HCO3 UR-SCNC: 27.2 MMOL/L (ref 24–28)
HCO3 UR-SCNC: 27.5 MMOL/L (ref 24–28)
HCO3 UR-SCNC: 28.5 MMOL/L (ref 24–28)
HCO3 UR-SCNC: 29 MMOL/L (ref 24–28)
HCO3 UR-SCNC: 29.9 MMOL/L (ref 24–28)
HCT VFR BLD AUTO: 25.8 %
HGB BLD-MCNC: 8.1 G/DL
HGB UR QL STRIP: ABNORMAL
HYALINE CASTS UR QL AUTO: 16 /LPF
IMM GRANULOCYTES # BLD AUTO: 0.22 K/UL
IMM GRANULOCYTES NFR BLD AUTO: 1 %
INR PPP: 1.2
KETONES UR QL STRIP: NEGATIVE
LEUKOCYTE ESTERASE UR QL STRIP: NEGATIVE
LYMPHOCYTES # BLD AUTO: 0.4 K/UL
LYMPHOCYTES NFR BLD: 2 %
MAGNESIUM SERPL-MCNC: 2.7 MG/DL
MCH RBC QN AUTO: 24 PG
MCHC RBC AUTO-ENTMCNC: 31.4 G/DL
MCV RBC AUTO: 77 FL
MICROSCOPIC COMMENT: ABNORMAL
MIN VOL: 12
MIN VOL: 12.1
MIN VOL: 12.3
MIN VOL: 12.5
MODE: ABNORMAL
MONOCYTES # BLD AUTO: 1.5 K/UL
MONOCYTES NFR BLD: 7 %
NEUTROPHILS # BLD AUTO: 19.5 K/UL
NEUTROPHILS NFR BLD: 89.9 %
NITRITE UR QL STRIP: NEGATIVE
NRBC BLD-RTO: 0 /100 WBC
PCO2 BLDA: 46.1 MMHG (ref 35–45)
PCO2 BLDA: 49.6 MMHG (ref 35–45)
PCO2 BLDA: 49.7 MMHG (ref 35–45)
PCO2 BLDA: 50.1 MMHG (ref 35–45)
PCO2 BLDA: 50.6 MMHG (ref 35–45)
PCO2 BLDA: 51.2 MMHG (ref 35–45)
PEEP: 8
PH SMN: 7.33 [PH] (ref 7.35–7.45)
PH SMN: 7.35 [PH] (ref 7.35–7.45)
PH SMN: 7.35 [PH] (ref 7.35–7.45)
PH SMN: 7.37 [PH] (ref 7.35–7.45)
PH SMN: 7.37 [PH] (ref 7.35–7.45)
PH SMN: 7.39 [PH] (ref 7.35–7.45)
PH UR STRIP: 5 [PH] (ref 5–8)
PHOSPHATE SERPL-MCNC: 5.2 MG/DL
PIP: 34
PIP: 37
PIP: 43
PIP: 46
PLATELET # BLD AUTO: 334 K/UL
PMV BLD AUTO: 9.9 FL
PO2 BLDA: 63 MMHG (ref 80–100)
PO2 BLDA: 64 MMHG (ref 80–100)
PO2 BLDA: 67 MMHG (ref 80–100)
PO2 BLDA: 67 MMHG (ref 80–100)
PO2 BLDA: 71 MMHG (ref 80–100)
PO2 BLDA: 73 MMHG (ref 80–100)
POC BE: 0 MMOL/L
POC BE: 1 MMOL/L
POC BE: 2 MMOL/L
POC BE: 3 MMOL/L
POC BE: 4 MMOL/L
POC BE: 5 MMOL/L
POC SATURATED O2: 90 % (ref 95–100)
POC SATURATED O2: 91 % (ref 95–100)
POC SATURATED O2: 91 % (ref 95–100)
POC SATURATED O2: 92 % (ref 95–100)
POC SATURATED O2: 93 % (ref 95–100)
POC SATURATED O2: 94 % (ref 95–100)
POC TCO2: 27 MMOL/L (ref 23–27)
POC TCO2: 29 MMOL/L (ref 23–27)
POC TCO2: 29 MMOL/L (ref 23–27)
POC TCO2: 30 MMOL/L (ref 23–27)
POC TCO2: 31 MMOL/L (ref 23–27)
POC TCO2: 31 MMOL/L (ref 23–27)
POCT GLUCOSE: 122 MG/DL (ref 70–110)
POCT GLUCOSE: 141 MG/DL (ref 70–110)
POCT GLUCOSE: 148 MG/DL (ref 70–110)
POCT GLUCOSE: 149 MG/DL (ref 70–110)
POCT GLUCOSE: 157 MG/DL (ref 70–110)
POCT GLUCOSE: 165 MG/DL (ref 70–110)
POCT GLUCOSE: 174 MG/DL (ref 70–110)
POTASSIUM SERPL-SCNC: 5.1 MMOL/L
POTASSIUM SERPL-SCNC: 5.3 MMOL/L
POTASSIUM SERPL-SCNC: 5.4 MMOL/L
POTASSIUM UR-SCNC: 30 MMOL/L
PROT SERPL-MCNC: 5.8 G/DL
PROT UR QL STRIP: NEGATIVE
PROT UR-MCNC: <7 MG/DL
PROT/CREAT RATIO, UR: NORMAL
PROTHROMBIN TIME: 12 SEC
RBC # BLD AUTO: 3.37 M/UL
RBC #/AREA URNS AUTO: 13 /HPF (ref 0–4)
SAMPLE: ABNORMAL
SITE: ABNORMAL
SODIUM SERPL-SCNC: 137 MMOL/L
SODIUM SERPL-SCNC: 139 MMOL/L
SODIUM SERPL-SCNC: 140 MMOL/L
SODIUM UR-SCNC: 83 MMOL/L
SP GR UR STRIP: 1.01 (ref 1–1.03)
SP02: 90
SP02: 90
SP02: 92
SP02: 93
SP02: 94
SP02: 97
SQUAMOUS #/AREA URNS AUTO: 1 /HPF
URATE CRY UR QL COMP ASSIST: ABNORMAL
URN SPEC COLLECT METH UR: ABNORMAL
UROBILINOGEN UR STRIP-ACNC: NEGATIVE EU/DL
UUN UR-MCNC: 260 MG/DL
VANCOMYCIN SERPL-MCNC: 24.4 UG/ML
VT: 350
VT: 350
VT: 370
WBC # BLD AUTO: 21.69 K/UL
WBC #/AREA URNS AUTO: 1 /HPF (ref 0–5)

## 2018-04-15 PROCEDURE — 84540 ASSAY OF URINE/UREA-N: CPT

## 2018-04-15 PROCEDURE — 63600175 PHARM REV CODE 636 W HCPCS: Performed by: NURSE PRACTITIONER

## 2018-04-15 PROCEDURE — 99232 SBSQ HOSP IP/OBS MODERATE 35: CPT | Mod: ,,, | Performed by: INTERNAL MEDICINE

## 2018-04-15 PROCEDURE — 63600175 PHARM REV CODE 636 W HCPCS: Performed by: INTERNAL MEDICINE

## 2018-04-15 PROCEDURE — 25000242 PHARM REV CODE 250 ALT 637 W/ HCPCS: Performed by: INTERNAL MEDICINE

## 2018-04-15 PROCEDURE — 85025 COMPLETE CBC W/AUTO DIFF WBC: CPT

## 2018-04-15 PROCEDURE — 27000221 HC OXYGEN, UP TO 24 HOURS

## 2018-04-15 PROCEDURE — 20000000 HC ICU ROOM

## 2018-04-15 PROCEDURE — 80048 BASIC METABOLIC PNL TOTAL CA: CPT

## 2018-04-15 PROCEDURE — 85610 PROTHROMBIN TIME: CPT

## 2018-04-15 PROCEDURE — 84300 ASSAY OF URINE SODIUM: CPT

## 2018-04-15 PROCEDURE — 87205 SMEAR GRAM STAIN: CPT

## 2018-04-15 PROCEDURE — 80076 HEPATIC FUNCTION PANEL: CPT

## 2018-04-15 PROCEDURE — 94761 N-INVAS EAR/PLS OXIMETRY MLT: CPT

## 2018-04-15 PROCEDURE — 25000003 PHARM REV CODE 250: Performed by: NURSE PRACTITIONER

## 2018-04-15 PROCEDURE — 81001 URINALYSIS AUTO W/SCOPE: CPT

## 2018-04-15 PROCEDURE — 25000003 PHARM REV CODE 250: Performed by: STUDENT IN AN ORGANIZED HEALTH CARE EDUCATION/TRAINING PROGRAM

## 2018-04-15 PROCEDURE — 82803 BLOOD GASES ANY COMBINATION: CPT

## 2018-04-15 PROCEDURE — 84133 ASSAY OF URINE POTASSIUM: CPT

## 2018-04-15 PROCEDURE — 99223 1ST HOSP IP/OBS HIGH 75: CPT | Mod: ,,, | Performed by: INTERNAL MEDICINE

## 2018-04-15 PROCEDURE — 94640 AIRWAY INHALATION TREATMENT: CPT

## 2018-04-15 PROCEDURE — 94003 VENT MGMT INPAT SUBQ DAY: CPT

## 2018-04-15 PROCEDURE — 80048 BASIC METABOLIC PNL TOTAL CA: CPT | Mod: 91

## 2018-04-15 PROCEDURE — 37799 UNLISTED PX VASCULAR SURGERY: CPT

## 2018-04-15 PROCEDURE — 99900026 HC AIRWAY MAINTENANCE (STAT)

## 2018-04-15 PROCEDURE — 83880 ASSAY OF NATRIURETIC PEPTIDE: CPT

## 2018-04-15 PROCEDURE — 82570 ASSAY OF URINE CREATININE: CPT

## 2018-04-15 PROCEDURE — 83735 ASSAY OF MAGNESIUM: CPT

## 2018-04-15 PROCEDURE — 99291 CRITICAL CARE FIRST HOUR: CPT | Mod: ,,, | Performed by: NURSE PRACTITIONER

## 2018-04-15 PROCEDURE — 80202 ASSAY OF VANCOMYCIN: CPT

## 2018-04-15 PROCEDURE — 25000003 PHARM REV CODE 250: Performed by: INTERNAL MEDICINE

## 2018-04-15 PROCEDURE — 84100 ASSAY OF PHOSPHORUS: CPT

## 2018-04-15 PROCEDURE — 99900035 HC TECH TIME PER 15 MIN (STAT)

## 2018-04-15 RX ORDER — FUROSEMIDE 10 MG/ML
80 INJECTION INTRAMUSCULAR; INTRAVENOUS ONCE
Status: COMPLETED | OUTPATIENT
Start: 2018-04-15 | End: 2018-04-15

## 2018-04-15 RX ORDER — HEPARIN SODIUM 5000 [USP'U]/ML
5000 INJECTION, SOLUTION INTRAVENOUS; SUBCUTANEOUS EVERY 8 HOURS
Status: DISCONTINUED | OUTPATIENT
Start: 2018-04-15 | End: 2018-04-25

## 2018-04-15 RX ADMIN — PROPOFOL 40 MCG/KG/MIN: 10 INJECTION, EMULSION INTRAVENOUS at 12:04

## 2018-04-15 RX ADMIN — IPRATROPIUM BROMIDE AND ALBUTEROL SULFATE 3 ML: .5; 3 SOLUTION RESPIRATORY (INHALATION) at 11:04

## 2018-04-15 RX ADMIN — MINERAL OIL AND WHITE PETROLATUM: 150; 830 OINTMENT OPHTHALMIC at 09:04

## 2018-04-15 RX ADMIN — CHLORHEXIDINE GLUCONATE 15 ML: 1.2 RINSE ORAL at 08:04

## 2018-04-15 RX ADMIN — MINERAL OIL AND WHITE PETROLATUM: 150; 830 OINTMENT OPHTHALMIC at 01:04

## 2018-04-15 RX ADMIN — PIPERACILLIN AND TAZOBACTAM 4.5 G: 4; .5 INJECTION, POWDER, LYOPHILIZED, FOR SOLUTION INTRAVENOUS; PARENTERAL at 01:04

## 2018-04-15 RX ADMIN — INSULIN ASPART 3 UNITS: 100 INJECTION, SOLUTION INTRAVENOUS; SUBCUTANEOUS at 11:04

## 2018-04-15 RX ADMIN — HYDROXYCHLOROQUINE SULFATE 400 MG: 200 TABLET, FILM COATED ORAL at 08:04

## 2018-04-15 RX ADMIN — INSULIN ASPART 3 UNITS: 100 INJECTION, SOLUTION INTRAVENOUS; SUBCUTANEOUS at 05:04

## 2018-04-15 RX ADMIN — STANDARDIZED SENNA CONCENTRATE AND DOCUSATE SODIUM 1 TABLET: 8.6; 5 TABLET, FILM COATED ORAL at 08:04

## 2018-04-15 RX ADMIN — METHYLPREDNISOLONE SODIUM SUCCINATE 80 MG: 125 INJECTION, POWDER, FOR SOLUTION INTRAMUSCULAR; INTRAVENOUS at 08:04

## 2018-04-15 RX ADMIN — INSULIN ASPART 3 UNITS: 100 INJECTION, SOLUTION INTRAVENOUS; SUBCUTANEOUS at 08:04

## 2018-04-15 RX ADMIN — FUROSEMIDE 80 MG: 10 INJECTION, SOLUTION INTRAMUSCULAR; INTRAVENOUS at 11:04

## 2018-04-15 RX ADMIN — HEPARIN SODIUM 5000 UNITS: 5000 INJECTION, SOLUTION INTRAVENOUS; SUBCUTANEOUS at 05:04

## 2018-04-15 RX ADMIN — IPRATROPIUM BROMIDE AND ALBUTEROL SULFATE 3 ML: .5; 3 SOLUTION RESPIRATORY (INHALATION) at 07:04

## 2018-04-15 RX ADMIN — PANTOPRAZOLE SODIUM 40 MG: 40 GRANULE, DELAYED RELEASE ORAL at 08:04

## 2018-04-15 RX ADMIN — MINERAL OIL AND WHITE PETROLATUM: 150; 830 OINTMENT OPHTHALMIC at 05:04

## 2018-04-15 RX ADMIN — INSULIN ASPART 3 UNITS: 100 INJECTION, SOLUTION INTRAVENOUS; SUBCUTANEOUS at 09:04

## 2018-04-15 RX ADMIN — PIPERACILLIN AND TAZOBACTAM 4.5 G: 4; .5 INJECTION, POWDER, LYOPHILIZED, FOR SOLUTION INTRAVENOUS; PARENTERAL at 06:04

## 2018-04-15 RX ADMIN — POLYETHYLENE GLYCOL 3350 17 G: 17 POWDER, FOR SOLUTION ORAL at 08:04

## 2018-04-15 RX ADMIN — PROPOFOL 45 MCG/KG/MIN: 10 INJECTION, EMULSION INTRAVENOUS at 04:04

## 2018-04-15 RX ADMIN — PROPOFOL 40 MCG/KG/MIN: 10 INJECTION, EMULSION INTRAVENOUS at 08:04

## 2018-04-15 RX ADMIN — IPRATROPIUM BROMIDE AND ALBUTEROL SULFATE 3 ML: .5; 3 SOLUTION RESPIRATORY (INHALATION) at 03:04

## 2018-04-15 RX ADMIN — FUROSEMIDE 80 MG: 10 INJECTION, SOLUTION INTRAMUSCULAR; INTRAVENOUS at 06:04

## 2018-04-15 RX ADMIN — INSULIN ASPART 3 UNITS: 100 INJECTION, SOLUTION INTRAVENOUS; SUBCUTANEOUS at 12:04

## 2018-04-15 RX ADMIN — INSULIN ASPART 3 UNITS: 100 INJECTION, SOLUTION INTRAVENOUS; SUBCUTANEOUS at 01:04

## 2018-04-15 RX ADMIN — PROPOFOL 45 MCG/KG/MIN: 10 INJECTION, EMULSION INTRAVENOUS at 09:04

## 2018-04-15 RX ADMIN — PIPERACILLIN AND TAZOBACTAM 4.5 G: 4; .5 INJECTION, POWDER, LYOPHILIZED, FOR SOLUTION INTRAVENOUS; PARENTERAL at 09:04

## 2018-04-15 RX ADMIN — Medication 125 MCG/HR: at 05:04

## 2018-04-15 RX ADMIN — DEXTROSE 300 MG: 5 SOLUTION INTRAVENOUS at 08:04

## 2018-04-15 RX ADMIN — HEPARIN SODIUM 5000 UNITS: 5000 INJECTION, SOLUTION INTRAVENOUS; SUBCUTANEOUS at 09:04

## 2018-04-15 RX ADMIN — CLOPIDOGREL 75 MG: 75 TABLET, FILM COATED ORAL at 08:04

## 2018-04-15 RX ADMIN — INSULIN ASPART 1 UNITS: 100 INJECTION, SOLUTION INTRAVENOUS; SUBCUTANEOUS at 04:04

## 2018-04-15 NOTE — PT/OT/SLP PROGRESS
Physical Therapy      Patient Name:  Selma Alonzo Lux MD   MRN:  [id number]    Attempted to visit pt today in AM - pt still intubated and not appropriate for treatment on this date. Will follow-up next scheduled date.    Osvaldo Tabor, PT   4/15/2018

## 2018-04-15 NOTE — CONSULTS
[facility]  Nephrology  Consult Note    Patient Name: Selma Lux MD  MRN: [id number]  Admission Date: 4/5/2018  Hospital Length of Stay: 10 days  Attending Provider: Patricia Kirkpatrick MD   Primary Care Physician: Bhargav Hirsch MD  Principal Problem:Acute hypoxemic respiratory failure    Inpatient consult to Nephrology  Consult performed by: JUHI DUMONT  Consult ordered by: PHILIPP LORD  Reason for consult: kermit and hyperkalemia        Subjective:     HPI: . Selma Lux MD  is a 83 yo AA female with a PMHx relevant for CVA, RA on plaquenil/prednisone and recently started on Humiria (3/22), HTN, and large goiter who is admitted to MICU acute respiratory failure [facility] and [facility]zed suspected ARDS, after she presented to [facility] ED on 4/5 with complaints of continually worsening shortness of breath, cough, and fatigue that started about 2 weeks prior. She has a complicated hospital stay as she was admitted to  with bilateral basilar infiltrates and was transferred to MICU after needeing higher level of care secondary to hypoxic respiratory failure. She had been treated with very broad spectrum abx secondary to her immunocompromise state with ID following vancomycin, zosyn, bactrim, and posaconazole. She became hemodynamically unstable and required vasopressors, Norepinephrine initiated for BP support suspected secondary to sedation by primary team. Bronchoscopy performed at bedside on 4/11. Nimbex has been discontinued on 4/12. She continues to require significant oxygen support. On 4/8 she had Chest CTA done. Nephrology consulted for KERMIT and hyperkalemia. She has a sCr baseline of 0.8-0.9 mg/dL. Her sCr started to rise on 4/11 night and has steadily been rising since. She had also been on trimethoprim and had a 75 ml exposure to iodine contrast in setting of hypotension requiring pressors. She was weaned of Norepi early this am  but her SBP dropped to 86 but her MAP remained at 65. She was restarted back on Norepi this am. She has been treated with Vancomcyn (Through levels 24 and ~ 25) and Pip/Tazo, Plus therapeutic dose of bactrim, now on prophylaxis dose. She has been diuresis through her Hospital stay with Furosemide different doses. She had elevation of her CBC about 3-4 day ago with no evidence of a rash. This am She was given 80 mg of Furosemide IV with increase in her UO. Her sCr peaked at 2.0 and her latest sCr was 1.9 mg/dL.    Past Medical History:   Diagnosis Date    Anemia     Arthritis     Hashimoto's disease     Hypertension     IGT (impaired glucose tolerance) 1/12/2016    Joint pain     Multinodular goiter 1/12/2016    Stroke        Past Surgical History:   Procedure Laterality Date    CATARACT EXTRACTION      COLONOSCOPY N/A 9/29/2015    Procedure: COLONOSCOPY;  Surgeon: FIDELINA Sanchez MD;  Location: [address]);  Service: Endoscopy;  Laterality: N/A;    EYE SURGERY      JOINT REPLACEMENT      right knee    KNEE SURGERY Left 12/31/2013    TKR    left parotidectomy      mixed tumor    SALIVARY GLAND SURGERY      TONSILLECTOMY         Review of patient's allergies indicates:  No Known Allergies  Current Facility-Administered Medications   Medication Frequency    acetaminophen tablet 650 mg Q8H PRN    albuterol-ipratropium 2.5mg-0.5mg/3mL nebulizer solution 3 mL Q4H PRN    albuterol-ipratropium 2.5mg-0.5mg/3mL nebulizer solution 3 mL Q4H    chlorhexidine 0.12 % solution 15 mL BID    clopidogrel tablet 75 mg QHS    dextrose 50% injection 12.5 g PRN    enoxaparin injection 40 mg Daily    fentaNYL 2500 mcg in 0.9% sodium chloride 250 mL infusion premix (titrating) Continuous    fentaNYL injection 50 mcg Q2H PRN    glucagon (human recombinant) injection 1 mg PRN    hydroxychloroquine tablet 400 mg Daily    insulin aspart U-100 pen 1-10 Units Q4H PRN    insulin aspart U-100 pen 3 Units Q24H     insulin aspart U-100 pen 3 Units Q24H    insulin aspart U-100 pen 3 Units Q24H    insulin aspart U-100 pen 3 Units Q24H    insulin aspart U-100 pen 3 Units Q24H    insulin aspart U-100 pen 3 Units Q24H    magnesium sulfate 2g in water 50mL IVPB (premix) PRN    magnesium sulfate 2g in water 50mL IVPB (premix) PRN    methylPREDNISolone sodium succinate injection 80 mg Q12H    norepinephrine 4 mg in dextrose 5% 250 mL infusion (premix) (titrating) Continuous    pantoprazole suspension 40 mg Daily    piperacillin-tazobactam 4.5 g in sodium chloride 0.9% 100 mL IVPB (ready to mix system) Q8H    polyethylene glycol packet 17 g Daily    posaconazole 300 mg in dextrose 5 % 150 mL infusion Daily    potassium chloride 10% oral solution 40 mEq PRN    potassium chloride 10% oral solution 40 mEq PRN    potassium chloride 10% oral solution 60 mEq PRN    potassium, sodium phosphates 280-160-250 mg packet 2 packet PRN    potassium, sodium phosphates 280-160-250 mg packet 2 packet PRN    potassium, sodium phosphates 280-160-250 mg packet 2 packet PRN    propofol (DIPRIVAN) 10 mg/mL infusion Continuous    senna-docusate 8.6-50 mg per tablet 1 tablet BID    sodium chloride 0.9% flush 5 mL PRN    [START ON 4/16/2018] sulfamethoxazole-trimethoprim 800-160mg per tablet 1 tablet Every Mon, Wed, Fri    white petrolatum-mineral oil (LUBIFRESH P.M.) ophthalmic ointment Q8H     Family History     Problem Relation (Age of Onset)    Breast cancer Maternal Grandmother    Cancer Father    Heart disease Mother    Hypertension Mother    Prostate cancer Father        Social History Main Topics    Smoking status: Never Smoker    Smokeless tobacco: Never Used    Alcohol use No      Comment: very seldom     Drug use: No    Sexual activity: No      Comment: ,  age 52,      Review of Systems   Reason unable to perform ROS: un able to obtain as she is sedated on [facility] ventilaition.     Objective:     Vital Signs  (Most Recent):  Temp: 98.7 °F (37.1 °C) (04/15/18 1100)  Pulse: 81 (04/15/18 1306)  Resp: (!) 30 (04/15/18 1306)  BP: (!) 97/51 (04/15/18 1300)  SpO2: (!) 91 % (04/15/18 1306)  O2 Device (Oxygen Therapy): ventilator (04/15/18 1103) Vital Signs (24h Range):  Temp:  [98.3 °F (36.8 °C)-99 °F (37.2 °C)] 98.7 °F (37.1 °C)  Pulse:  [78-90] 81  Resp:  [29-38] 30  SpO2:  [88 %-97 %] 91 %  BP: ()/(44-55) 97/51  Arterial Line BP: ()/(32-49) 87/37     Weight: 71.5 kg (157 lb 10.1 oz) (04/13/18 0600)  Body mass index is 26.23 kg/m².  Body surface area is 1.81 meters squared.    I/O last 3 completed shifts:  In: 4043.6 [I.V.:1393.6; NG/GT:1750; IV Piggyback:900]  Out: 1695 [Urine:1695]    Physical Exam   Constitutional: She appears well-developed and well-nourished. She appears ill. No distress. She is sedated and intubated.   HENT:   Head: Normocephalic and atraumatic.   Eyes: Conjunctivae are normal. Pupils are equal, round, and reactive to light.   Neck: Trachea normal. Neck supple. No JVD present.   Cardiovascular: Normal rate, regular rhythm, S1 normal, S2 normal, intact distal pulses and normal pulses.  Exam reveals no gallop and no friction rub.    No murmur heard.  Pulmonary/Chest: She is intubated. She has decreased breath sounds in the right lower field and the left lower field. She has no wheezes. She has rales in the right middle field, the right lower field, the left middle field and the left lower field.   Abdominal: Soft. Bowel sounds are normal. She exhibits no distension. There is no tenderness.   Musculoskeletal: Normal range of motion. She exhibits edema.   [facility]:   [facility]   Skin: Skin is warm and dry. Capillary refill takes less than 2 seconds.   Psychiatric:   Sedated     Vitals reviewed.      Significant Labs:  BMP:   Recent Labs  Lab 04/15/18  0250 04/15/18  1126   * 148*    105   CO2 22* 25   BUN 59* 67*   CREATININE 2.0* 1.9*   CALCIUM 8.2* 8.0*   MG  2.7*  --      Cardiac Markers: No results for input(s): CKMB, TROPONINT, MYOGLOBIN in the last 168 hours.  CBC:   Recent Labs  Lab 04/15/18  0250   WBC 21.69*   RBC 3.37*   HGB 8.1*   HCT 25.8*      MCV 77*   MCH 24.0*   MCHC 31.4*     CMP:   Recent Labs  Lab 04/15/18  0250 04/15/18  1126   * 148*   CALCIUM 8.2* 8.0*   ALBUMIN 1.6*  --    PROT 5.8*  --     139   K 5.4* 5.3*   CO2 22* 25    105   BUN 59* 67*   CREATININE 2.0* 1.9*   ALKPHOS 92  --    ALT 17  --    AST 15  --    BILITOT 0.2  --        Recent Labs  Lab 04/10/18  1059   COLORU Yellow   SPECGRAV 1.005   PHUR 5.0   PROTEINUA Negative   NITRITE Negative   LEUKOCYTESUR Negative   UROBILINOGEN Negative     All labs within the past 24 hours have been reviewed.    Significant Imaging:  Labs: Reviewed  X-Ray: Reviewed  CT: Reviewed  Echo: Reviewed    Assessment/Plan:     KERMIT (acute kidney injury)    KERMIT non oliguric stage 2 by KDIGO, suspect multifactorial tubular injury suspecting ATN from hypoperfusion, bactrim and Vancomycin in conjunction with Pip/Tazo in setting of contrast exposure. 72 Hrs after exposure her sCr rised by 0.3 mg/dL. Suspect all this factors contributed to her KERMIT    · Obtain urine Na, urine creat, urine urea to calculate FeNa/Fe urea  · Check protein creat ratio, wrights stain   · Check renal/retroperitoneal USG   · Evaluate Urine sediment  · Agree with diuretics as her UO improve with adequate response. Would repeat dose of 80 mg today.  · Please keep SBP > 90 and MAP > 65  · Another possible injury is ischemia she has decrease her Hbg since admission no in the mid 7's range.   · Consider close monitor of Vanc Through levels if there is another option for Pip/Tazo with Vanc combination would probably be less toxic to kidneys  · Recommend to stop Bactrim completely and consider another agent for PCP PPX like pentamidine Dapsone please discuss with ID  · continue to monitor strict I/O's, daily weights, renally dose  medications, and avoid nephrotoxic agents                Hyperkalemia    Most likely seondary to KERMIT and Bactrim use in addition to high dose steroids.  - would recommend to switch Bactrim to other agent for PPX   - Continue Furosemide   - consider changing tube feeds to renal formula.            Thank you for your consult. I will follow-up with patient. Please contact us if you have any additional questions.    Melecio Macias MD  Nephrology  [facility]        I have reviewed and concur with the fellow's history, physical, assessment, and plan. I have personally interviewed and examined the patient at bedside.

## 2018-04-15 NOTE — ASSESSMENT & PLAN NOTE
--Suspect secondary to severe bilateral pneumonia in the setting of immunosuppression.  --Severely worsened over first week of admission despite CAP treatment.  --s/p emergent intubation on 4/11 upon MICU admission for severe hypoxemia and severe respiratory distress.  --Continue mechanical ventilation, wean support as tolerated.  --Bronchoscopy on 4/11 with wash; preliminary culture with few WBCs, no organisms.  --Antibiotics broadened to vancomycin, zosyn, bactrim, and posaconazole; appreciate ID assistance with management.  --Blood, sputum, and urine cultures negative.   --KOH prep negative, AFB stain with no acid fast bacilli noted, fungal culture pending  --Aspergillus, Histoplasma, and Cryptococcal antigen in blood is negative. Legionella and blasomyces negative.   --Cytology and viral panel pending.  --Continue antibiotics and IV steroids. Preliminary GMS stain negative, Bactrim (treatment dose) discontinued 4/13.  Remains on prophylaxis dose of Bactrim.    --P/F ratio mildly improved to 111.  Low peep as she has not been peep responsive.   --CXR with mild improvement however continues to require significant oxygen.  --consider restarting paralytic and possible proning if worsens.   -- furosemide 20 mg IV ordered without significant response on 4/14.   --IVC plump and overall 3 L positive. Repeat lasix at increased dose-80 mg IV.

## 2018-04-15 NOTE — PROGRESS NOTES
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 ED on 4/5 with complaints of continually worsening shortness of breath, cough, and fatigue that started about 2 weeks prior. She was seen by her PCP on 4/2 for these complaints and received a prescription for duo nebs and tessalon perles. CXR performed on 4/2 noted bilateral basilar infiltrates. At that time, she denied fever, chills, sputum production, sick contacts. She was admitted to  and treated emprically for CAP with rocephin and azithromycin. She was also diuresed daily. However, her oxygen requirements slowly increased from 2 L NC to 4 L NC with also progressively worsening bilateral infiltrates on imaging. CTA performed on 4/9 negative for PE, however noted significant bilateral consolidations. On 4/11, her hypoxemia continued to worsen, requiring NRB mask, and she developed severe respiratory distress. She was transferred to the MICU and emergently intubated. Repeat CT chest performed on 4/11 demonstrated progressive worsening of bilateral peripheral infiltrates.    Hospital/ICU Course:  Admitted to MICU on 4/11, intubated emergently for severe hypoxemia and respiratory distress. ID was consulted for assistance in management given immunocompromised state. Patient paralyzed with nimbex following persistent dysynchrony with vent despite sedation. Abx broadened to vancomycin, zosyn, bactrim, and posaconazole. Norepinephrine initiated for BP support.  Bronchoscopy performed at bedside on 4/11.   Nimbex discontinued on 4/12. She continues to require significant oxygen support.     Interval History/Significant Events: No acute events overnight.     Review of Systems   Unable to perform ROS: Intubated     Objective:     Vital Signs (Most Recent):  Temp: 98.7 °F (37.1 °C) (04/15/18 1100)  Pulse: 85 (04/15/18 1600)  Resp: (!) 30 (04/15/18 1600)  BP: (!) 88/49 (04/15/18 1600)  SpO2: (!) 93 % (04/15/18 1600) Vital Signs (24h Range):  Temp:  [98.3 °F (36.8 °C)-99 °F (37.2 °C)] 98.7 °F (37.1 °C)  Pulse:  [78-90] 85  Resp:  [30-38] 30  SpO2:  [89 %-97 %] 93 %  BP: ()/(44-55) 88/49  Arterial Line BP: ()/(32-49) 97/44   Weight: 71.5 kg (157 lb 10.1 oz)  Body mass index is 26.23 kg/m².      Intake/Output Summary (Last 24 hours) at 04/15/18 1629  Last data filed at 04/15/18 1600   Gross per 24 hour   Intake          2346.42 ml   Output             1740 ml   Net           606.42 ml       Physical Exam   Constitutional: She appears ill. No distress. She is intubated.   HENT:   Head: Normocephalic and atraumatic.   Eyes: Conjunctivae are normal. Right eye exhibits no discharge and no exudate. Left eye exhibits no discharge and no exudate. No scleral icterus. Right eye exhibits no nystagmus. Left eye exhibits no nystagmus.   Neck: No tracheal deviation present.   Cardiovascular: Normal rate, regular rhythm, normal heart sounds and intact distal pulses.  PMI is not displaced.  Exam reveals no gallop and no friction rub.    No murmur heard.  Pulses:       Radial pulses are 2+ on the right side, and 2+ on the left side.        Dorsalis pedis pulses are 2+ on the right side, and 2+ on the left side.   Warm extremities   Pulmonary/Chest: Effort normal. No accessory muscle usage. Tachypnea noted. She is intubated. No respiratory distress. She has no decreased breath sounds. She has no wheezes. She has no rhonchi. She has rales in the right middle field, the right lower  field, the left middle field and the left lower field.   Abdominal: Soft. Normal appearance and bowel sounds are normal. She exhibits no mass. There is no tenderness.   Genitourinary:   Genitourinary Comments: Hill catheter draining clear, yellow urine   Lymphadenopathy:     She has no cervical adenopathy.   Neurological: GCS eye subscore is 3. GCS verbal subscore is 1. GCS motor subscore is 6.   On sedation. Eyes closed. Pupils 3 mm equal, round and reactive to light.  No gaze deviation, nystagmus or roving eye movements.  +cough and gag reflex.  No spontaneous movement in any extremity.    Skin: Skin is warm, dry and intact. She is not diaphoretic.   Nursing note and vitals reviewed.      Vents:  Vent Mode: A/C (04/15/18 1505)  Set Rate: 30 bmp (04/15/18 1505)  Vt Set: 370 mL (04/15/18 1505)  Pressure Support: 0 cmH20 (04/15/18 1505)  PEEP/CPAP: 8 cmH20 (04/15/18 1505)  Oxygen Concentration (%): 60 (04/15/18 1600)  Peak Airway Pressure: 43 cmH2O (04/15/18 1505)  Plateau Pressure: 22 cmH20 (04/15/18 1505)  Total Ve: 11.7 mL (04/15/18 1505)  F/VT Ratio<105 (RSBI): (!) 77.52 (04/15/18 1505)  Lines/Drains/Airways     Central Venous Catheter Line                 Percutaneous Central Line Insertion/Assessment - triple lumen  04/11/18 1513 right subclavian 4 days          Drain                 NG/OG Tube 04/11/18 1340 Left mouth 4 days         Urethral Catheter 04/11/18 1430 Double-lumen 16 Fr. 4 days          Airway                 Airway - Non-Surgical 04/11/18 1337 Endotracheal Tube 4 days          Arterial Line                 Arterial Line 04/11/18 1540 Right Radial 4 days          Peripheral Intravenous Line                 Peripheral IV - Single Lumen 04/11/18 1307 Left Antecubital 4 days              Significant Labs:    CBC/Anemia Profile:    Recent Labs  Lab 04/14/18  0322 04/15/18  0250   WBC 29.60* 21.69*   HGB 8.1* 8.1*   HCT 26.2* 25.8*   * 334   MCV 76* 77*   RDW 16.3* 16.7*         Chemistries:    Recent Labs  Lab 04/14/18  0322 04/14/18  0403 04/15/18  0250 04/15/18  1126   * 133* 137 139   K 5.3* 5.6* 5.4* 5.3*    102 104 105   CO2 23 23 22* 25   BUN 37* 48* 59* 67*   CREATININE 1.9* 1.9* 2.0* 1.9*   CALCIUM 8.2* 8.2* 8.2* 8.0*   ALBUMIN 1.7*  --  1.6*  --    PROT 6.1  --  5.8*  --    BILITOT 0.2  --  0.2  --    ALKPHOS 107  --  92  --    ALT 19  --  17  --    AST 23  --  15  --    MG 2.7*  --  2.7*  --    PHOS 5.7*  --  5.2*  --        All pertinent labs within the past 24 hours have been reviewed.    Significant Imaging:  I have reviewed and interpreted all pertinent imaging results/findings within the past 24 hours.    Assessment/Plan:     Neuro   Encephalopathy, metabolic    --suspect secondary to sedatives for ventilator dyssynchrony and critical illness.    --non-focal on exam on 4/14.    --sedation vacation not performed due to high oxygen requirements.           Cerebrovascular accident (CVA)    --Remote lacunar infarcts in the right centrum semi-ovale, basal ganglia, and right tatyana per CTH w/o contrast on 12/2017.  --Continue home clopidogrel.        Pulmonary   * Acute hypoxemic respiratory failure    --Suspect secondary to severe bilateral pneumonia in the setting of immunosuppression.  --Severely worsened over first week of admission despite CAP treatment.  --s/p emergent intubation on 4/11 upon MICU admission for severe hypoxemia and severe respiratory distress.  --Continue mechanical ventilation, wean support as tolerated.  --Bronchoscopy on 4/11 with wash; preliminary culture with few WBCs, no organisms.  --Blood and urine cultures negative.   --KOH prep negative, AFB stain with no acid fast bacilli noted, fungal culture pending  --Aspergillus, Histoplasma, and Cryptococcal antigen in blood is negative. Legionella and blasomyces negative.   --Cytology and viral panel pending.  --Continue antibiotics (pip/tazo + posaconazole)  and IV steroids. Preliminary GMS  "stain negative, Bactrim (treatment dose) discontinued 4/13.  Remains on prophylaxis dose of Bactrim. Vancomycin d/c'd 4/15. Leukocytosis improving, T max 99.  --P/F ratio mildly improved to 111.  Low peep as she has not been peep responsive.   --consider restarting paralytic and possible proning if worsens.   -- furosemide 20 mg IV ordered without significant response on 4/14.   --IVC plump and overall 3 L positive. Repeat lasix at increased dose-80 mg IV.         ARDS (adult respiratory distress syndrome)    - See "Acute hypoxemic respiratory failure".        Pneumonia of both upper lobes due to infectious organism    - See "Acute hypoxemic respiratory failure".        Cardiac/Vascular   Hypertension, essential    - Holding home medications in setting of shock.        Renal/   Hyperkalemia    --slightly improved following kayexalate on 4/14.  Continue to trend.         KERMIT (acute kidney injury)    --sCr increased to 1.9, with baseline 0.9 on admission.  --possibly secondary to Bactrim as it affects the kidney ability to excrete Cr vs ATN   --FEUrea 46%, clinically hypervolemic on exam with edema in BLE and plump IVC.   --Lasix challenge  --consult nephrology   --retroperitoneal ultrasound ordered.   --urine eos negative.           ID   Septic shock    --Likely secondary to pneumonia with iatrogenic component given amount of sedation need to keep the patient synchronous with ventilator.  --Started on norepinephrine and titrating to MAP >65; wean as tolerated..  --Continue antibiotics. Lactate wnl.        Endocrine   On tube feeding diet    --appreciate nutrition assistance.  --tolerating enteral feedings at goal        Hyperglycemia    --Likely steroid-induced.  --Started on insulin gtt; now transitioned to SQ insulin.  --Goal -180.        Thyroid enlargement    --History of Hashimoto's thyroiditis.  --Intrathoracic extension on CT.  --TSH and free T4 wnl.          GI   PUD (peptic ulcer disease)    " "--Continue PPI.        Orthopedic   Seropositive rheumatoid arthritis of multiple sites    --Appreciate Rheumatology assistance.  --Continue hydroxychloroquine.  --Hold home prednisone; switched to IV SoluMedrol 80mg Q12h.        Other   Long term (current) use of systemic steroids    - See "Seropositive rheumatoid arthritis of multiple sites".          Discussed with Dr. Kirkpatrick.  Patient's daughter updated on rounds.     Critical Care Time: 35  minutes  Critical secondary to Patient has a condition that poses threat to life and bodily function: Acute hypoxemic respiratory failure/ARDs requiring mechanical ventilation.       Critical care was time spent personally by me on the following activities: development of treatment plan with patient or surrogate and bedside caregivers, discussions with consultants, evaluation of patient's response to treatment, examination of patient, ordering and performing treatments and interventions, ordering and review of laboratory studies, ordering and review of radiographic studies, pulse oximetry, re-evaluation of patient's condition. This critical care time did not overlap with that of any other provider or involve time for any procedures.     Jonna Farah NP  Critical Care Medicine  [facility]  "

## 2018-04-15 NOTE — ASSESSMENT & PLAN NOTE
Most likely seondary to KERMIT and Bactrim use in addition to high dose steroids.  - would recommend to switch Bactrim to other agent for PPX   - Continue Furosemide   - consider changing tube feeds to renal formula.

## 2018-04-15 NOTE — SUBJECTIVE & OBJECTIVE
Past Medical History:   Diagnosis Date    Anemia     Arthritis     Hashimoto's disease     Hypertension     IGT (impaired glucose tolerance) 1/12/2016    Joint pain     Multinodular goiter 1/12/2016    Stroke        Past Surgical History:   Procedure Laterality Date    CATARACT EXTRACTION      COLONOSCOPY N/A 9/29/2015    Procedure: COLONOSCOPY;  Surgeon: FIDELINA Sanchez MD;  Location: [facility] ([address] [place] [address]);  Service: Endoscopy;  Laterality: N/A;    EYE SURGERY      JOINT REPLACEMENT      right knee    KNEE SURGERY Left 12/31/2013    TKR    left parotidectomy      mixed tumor    SALIVARY GLAND SURGERY      TONSILLECTOMY         Review of patient's allergies indicates:  No Known Allergies  Current Facility-Administered Medications   Medication Frequency    acetaminophen tablet 650 mg Q8H PRN    albuterol-ipratropium 2.5mg-0.5mg/3mL nebulizer solution 3 mL Q4H PRN    albuterol-ipratropium 2.5mg-0.5mg/3mL nebulizer solution 3 mL Q4H    chlorhexidine 0.12 % solution 15 mL BID    clopidogrel tablet 75 mg QHS    dextrose 50% injection 12.5 g PRN    enoxaparin injection 40 mg Daily    fentaNYL 2500 mcg in 0.9% sodium chloride 250 mL infusion premix (titrating) Continuous    fentaNYL injection 50 mcg Q2H PRN    glucagon (human recombinant) injection 1 mg PRN    hydroxychloroquine tablet 400 mg Daily    insulin aspart U-100 pen 1-10 Units Q4H PRN    insulin aspart U-100 pen 3 Units Q24H    insulin aspart U-100 pen 3 Units Q24H    insulin aspart U-100 pen 3 Units Q24H    insulin aspart U-100 pen 3 Units Q24H    insulin aspart U-100 pen 3 Units Q24H    insulin aspart U-100 pen 3 Units Q24H    magnesium sulfate 2g in water 50mL IVPB (premix) PRN    magnesium sulfate 2g in water 50mL IVPB (premix) PRN    methylPREDNISolone sodium succinate injection 80 mg Q12H    norepinephrine 4 mg in dextrose 5% 250 mL infusion (premix) (titrating) Continuous    pantoprazole suspension 40 mg Daily     piperacillin-tazobactam 4.5 g in sodium chloride 0.9% 100 mL IVPB (ready to mix system) Q8H    polyethylene glycol packet 17 g Daily    posaconazole 300 mg in dextrose 5 % 150 mL infusion Daily    potassium chloride 10% oral solution 40 mEq PRN    potassium chloride 10% oral solution 40 mEq PRN    potassium chloride 10% oral solution 60 mEq PRN    potassium, sodium phosphates 280-160-250 mg packet 2 packet PRN    potassium, sodium phosphates 280-160-250 mg packet 2 packet PRN    potassium, sodium phosphates 280-160-250 mg packet 2 packet PRN    propofol (DIPRIVAN) 10 mg/mL infusion Continuous    senna-docusate 8.6-50 mg per tablet 1 tablet BID    sodium chloride 0.9% flush 5 mL PRN    [START ON 4/16/2018] sulfamethoxazole-trimethoprim 800-160mg per tablet 1 tablet Every Mon, Wed, Fri    white petrolatum-mineral oil (LUBIFRESH P.M.) ophthalmic ointment Q8H     Family History     Problem Relation (Age of Onset)    Breast cancer Maternal Grandmother    Cancer Father    Heart disease Mother    Hypertension Mother    Prostate cancer Father        Social History Main Topics    Smoking status: Never Smoker    Smokeless tobacco: Never Used    Alcohol use No      Comment: very seldom     Drug use: No    Sexual activity: No      Comment: ,  age 52,      Review of Systems   Reason unable to perform ROS: un able to obtain as she is sedated on [facility] ventilaition.     Objective:     Vital Signs (Most Recent):  Temp: 98.7 °F (37.1 °C) (04/15/18 1100)  Pulse: 81 (04/15/18 1306)  Resp: (!) 30 (04/15/18 1306)  BP: (!) 97/51 (04/15/18 1300)  SpO2: (!) 91 % (04/15/18 1306)  O2 Device (Oxygen Therapy): ventilator (04/15/18 1103) Vital Signs (24h Range):  Temp:  [98.3 °F (36.8 °C)-99 °F (37.2 °C)] 98.7 °F (37.1 °C)  Pulse:  [78-90] 81  Resp:  [29-38] 30  SpO2:  [88 %-97 %] 91 %  BP: ()/(44-55) 97/51  Arterial Line BP: ()/(32-49) 87/37     Weight: 71.5 kg (157 lb 10.1 oz) (04/13/18 0600)  Body mass  index is 26.23 kg/m².  Body surface area is 1.81 meters squared.    I/O last 3 completed shifts:  In: 4043.6 [I.V.:1393.6; NG/GT:1750; IV Piggyback:900]  Out: 1695 [Urine:1695]    Physical Exam   Constitutional: She appears well-developed and well-nourished. She appears ill. No distress. She is sedated and intubated.   HENT:   Head: Normocephalic and atraumatic.   Eyes: Conjunctivae are normal. Pupils are equal, round, and reactive to light.   Neck: Trachea normal. Neck supple. No JVD present.   Cardiovascular: Normal rate, regular rhythm, S1 normal, S2 normal, intact distal pulses and normal pulses.  Exam reveals no gallop and no friction rub.    No murmur heard.  Pulmonary/Chest: She is intubated. She has decreased breath sounds in the right lower field and the left lower field. She has no wheezes. She has rales in the right middle field, the right lower field, the left middle field and the left lower field.   Abdominal: Soft. Bowel sounds are normal. She exhibits no distension. There is no tenderness.   Musculoskeletal: Normal range of motion. She exhibits edema.   Neurological:   Sedated on mech ventilation   Skin: Skin is warm and dry. Capillary refill takes less than 2 seconds.   Psychiatric:   Sedated     Vitals reviewed.      Significant Labs:  BMP:   Recent Labs  Lab 04/15/18  0250 04/15/18  1126   * 148*    105   CO2 22* 25   BUN 59* 67*   CREATININE 2.0* 1.9*   CALCIUM 8.2* 8.0*   MG 2.7*  --      Cardiac Markers: No results for input(s): CKMB, TROPONINT, MYOGLOBIN in the last 168 hours.  CBC:   Recent Labs  Lab 04/15/18  0250   WBC 21.69*   RBC 3.37*   HGB 8.1*   HCT 25.8*      MCV 77*   MCH 24.0*   MCHC 31.4*     CMP:   Recent Labs  Lab 04/15/18  0250 04/15/18  1126   * 148*   CALCIUM 8.2* 8.0*   ALBUMIN 1.6*  --    PROT 5.8*  --     139   K 5.4* 5.3*   CO2 22* 25    105   BUN 59* 67*   CREATININE 2.0* 1.9*   ALKPHOS 92  --    ALT 17  --    AST 15  --    BILITOT  0.2  --        Recent Labs  Lab 04/10/18  1059   COLORU Yellow   SPECGRAV 1.005   PHUR 5.0   PROTEINUA Negative   NITRITE Negative   LEUKOCYTESUR Negative   UROBILINOGEN Negative     All labs within the past 24 hours have been reviewed.    Significant Imaging:  Labs: Reviewed  X-Ray: Reviewed  CT: Reviewed  Echo: Reviewed

## 2018-04-15 NOTE — ASSESSMENT & PLAN NOTE
KERMIT non oliguric stage 2 by KDIGO, suspect multifactorial tubular injury suspecting ATN from hypoperfusion, bactrim and Vancomycin in conjunction with Pip/Tazo in setting of contrast exposure. 72 Hrs after exposure her sCr rised by 0.3 mg/dL. Suspect all this factors contributed to her KERMIT    · Obtain urine Na, urine creat, urine urea to calculate FeNa/Fe urea  · Check protein creat ratio, wrights stain   · Check renal/retroperitoneal USG   · Evaluate Urine sediment  · Agree with diuretics as her UO improve with adequate response. Would repeat dose of 80 mg today.  · Please keep SBP > 90 and MAP > 65  · Another possible injury is ischemia she has decrease her Hbg since admission no in the mid 7's range.   · Consider close monitor of Vanc Through levels if there is another option for Pip/Tazo with Vanc combination would probably be less toxic to kidneys  · Recommend to stop Bactrim completely and consider another agent for PCP PPX like pentamidine Dapsone please discuss with ID  · continue to monitor strict I/O's, daily weights, renally dose medications, and avoid nephrotoxic agents

## 2018-04-15 NOTE — ASSESSMENT & PLAN NOTE
--suspect secondary to sedatives for ventilator dyssynchrony and critical illness.    --non-focal on exam on 4/14.    --sedation vacation not performed due to high oxygen requirements.

## 2018-04-15 NOTE — ASSESSMENT & PLAN NOTE
--sCr increased to 1.9, with baseline 0.9 on admission.  --possibly secondary to Bactrim as it affects the kidney ability to excrete Cr vs ATN   --FEUrea 46%, clinically hypervolemic on exam with edema in BLE and plump IVC.   --Lasix challenge  --consult nephrology   --retroperitoneal ultrasound ordered.   --urine eos negative.

## 2018-04-15 NOTE — PLAN OF CARE
Problem: Patient Care Overview  Goal: Plan of Care Review  Outcome: Ongoing (interventions implemented as appropriate)  POC reviewed with pt and her family members.  All questions and concerns addressed.  SAT not performed today per Dr. Kirkpatrick.  Levo restarted at .01 to maintain MAP >65.  3 BMs this shift.  Following administration of lasix urine output increased to 200-100cc/hr.  Tube feeds tolerated with no residual.  Restraints discontinued.

## 2018-04-15 NOTE — SUBJECTIVE & OBJECTIVE
Interval History/Significant Events: No acute events overnight.     Review of Systems   Unable to perform ROS: Intubated     Objective:     Vital Signs (Most Recent):  Temp: 98.7 °F (37.1 °C) (04/15/18 1100)  Pulse: 85 (04/15/18 1600)  Resp: (!) 30 (04/15/18 1600)  BP: (!) 88/49 (04/15/18 1600)  SpO2: (!) 93 % (04/15/18 1600) Vital Signs (24h Range):  Temp:  [98.3 °F (36.8 °C)-99 °F (37.2 °C)] 98.7 °F (37.1 °C)  Pulse:  [78-90] 85  Resp:  [30-38] 30  SpO2:  [89 %-97 %] 93 %  BP: ()/(44-55) 88/49  Arterial Line BP: ()/(32-49) 97/44   Weight: 71.5 kg (157 lb 10.1 oz)  Body mass index is 26.23 kg/m².      Intake/Output Summary (Last 24 hours) at 04/15/18 1629  Last data filed at 04/15/18 1600   Gross per 24 hour   Intake          2346.42 ml   Output             1740 ml   Net           606.42 ml       Physical Exam   Constitutional: She appears ill. No distress. She is intubated.   HENT:   Head: Normocephalic and atraumatic.   Eyes: Conjunctivae are normal. Right eye exhibits no discharge and no exudate. Left eye exhibits no discharge and no exudate. No scleral icterus. Right eye exhibits no nystagmus. Left eye exhibits no nystagmus.   Neck: No tracheal deviation present.   Cardiovascular: Normal rate, regular rhythm, normal heart sounds and intact distal pulses.  PMI is not displaced.  Exam reveals no gallop and no friction rub.    No murmur heard.  Pulses:       Radial pulses are 2+ on the right side, and 2+ on the left side.        Dorsalis pedis pulses are 2+ on the right side, and 2+ on the left side.   Warm extremities   Pulmonary/Chest: Effort normal. No accessory muscle usage. Tachypnea noted. She is intubated. No respiratory distress. She has no decreased breath sounds. She has no wheezes. She has no rhonchi. She has rales in the right middle field, the right lower field, the left middle field and the left lower field.   Abdominal: Soft. Normal appearance and bowel sounds are normal. She exhibits no  mass. There is no tenderness.   Genitourinary:   Genitourinary Comments: Hill catheter draining clear, yellow urine   Lymphadenopathy:     She has no cervical adenopathy.   Neurological: GCS eye subscore is 3. GCS verbal subscore is 1. GCS motor subscore is 6.   On sedation. Eyes closed. Pupils 3 mm equal, round and reactive to light.  No gaze deviation, nystagmus or roving eye movements.  +cough and gag reflex.  No spontaneous movement in any extremity.    Skin: Skin is warm, dry and intact. She is not diaphoretic.   Nursing note and vitals reviewed.      Vents:  Vent Mode: A/C (04/15/18 1505)  Set Rate: 30 bmp (04/15/18 1505)  Vt Set: 370 mL (04/15/18 1505)  Pressure Support: 0 cmH20 (04/15/18 1505)  PEEP/CPAP: 8 cmH20 (04/15/18 1505)  Oxygen Concentration (%): 60 (04/15/18 1600)  Peak Airway Pressure: 43 cmH2O (04/15/18 1505)  Plateau Pressure: 22 cmH20 (04/15/18 1505)  Total Ve: 11.7 mL (04/15/18 1505)  F/VT Ratio<105 (RSBI): (!) 77.52 (04/15/18 1505)  Lines/Drains/Airways     Central Venous Catheter Line                 Percutaneous Central Line Insertion/Assessment - triple lumen  04/11/18 1513 right subclavian 4 days          Drain                 NG/OG Tube 04/11/18 1340 Left mouth 4 days         Urethral Catheter 04/11/18 1430 Double-lumen 16 Fr. 4 days          Airway                 Airway - Non-Surgical 04/11/18 1337 Endotracheal Tube 4 days          Arterial Line                 Arterial Line 04/11/18 1540 Right Radial 4 days          Peripheral Intravenous Line                 Peripheral IV - Single Lumen 04/11/18 1307 Left Antecubital 4 days              Significant Labs:    CBC/Anemia Profile:    Recent Labs  Lab 04/14/18  0322 04/15/18  0250   WBC 29.60* 21.69*   HGB 8.1* 8.1*   HCT 26.2* 25.8*   * 334   MCV 76* 77*   RDW 16.3* 16.7*        Chemistries:    Recent Labs  Lab 04/14/18  0322 04/14/18  0403 04/15/18  0250 04/15/18  1126   * 133* 137 139   K 5.3* 5.6* 5.4* 5.3*    102  104 105   CO2 23 23 22* 25   BUN 37* 48* 59* 67*   CREATININE 1.9* 1.9* 2.0* 1.9*   CALCIUM 8.2* 8.2* 8.2* 8.0*   ALBUMIN 1.7*  --  1.6*  --    PROT 6.1  --  5.8*  --    BILITOT 0.2  --  0.2  --    ALKPHOS 107  --  92  --    ALT 19  --  17  --    AST 23  --  15  --    MG 2.7*  --  2.7*  --    PHOS 5.7*  --  5.2*  --        All pertinent labs within the past 24 hours have been reviewed.    Significant Imaging:  I have reviewed and interpreted all pertinent imaging results/findings within the past 24 hours.

## 2018-04-16 LAB
ALBUMIN SERPL BCP-MCNC: 1.5 G/DL
ALLENS TEST: ABNORMAL
ALP SERPL-CCNC: 74 U/L
ALT SERPL W/O P-5'-P-CCNC: 15 U/L
ANION GAP SERPL CALC-SCNC: 12 MMOL/L
ANION GAP SERPL CALC-SCNC: 12 MMOL/L
ANION GAP SERPL CALC-SCNC: 7 MMOL/L
AST SERPL-CCNC: 25 U/L
BACTERIA BLD CULT: NORMAL
BACTERIA BLD CULT: NORMAL
BASOPHILS # BLD AUTO: 0.01 K/UL
BASOPHILS NFR BLD: 0.1 %
BILIRUB DIRECT SERPL-MCNC: 0.2 MG/DL
BILIRUB SERPL-MCNC: 0.2 MG/DL
BUN SERPL-MCNC: 80 MG/DL
BUN SERPL-MCNC: 81 MG/DL
BUN SERPL-MCNC: 95 MG/DL
CALCIUM SERPL-MCNC: 8 MG/DL
CALCIUM SERPL-MCNC: 8.2 MG/DL
CALCIUM SERPL-MCNC: 8.5 MG/DL
CHLORIDE SERPL-SCNC: 104 MMOL/L
CHLORIDE SERPL-SCNC: 105 MMOL/L
CHLORIDE SERPL-SCNC: 105 MMOL/L
CO2 SERPL-SCNC: 26 MMOL/L
CO2 SERPL-SCNC: 26 MMOL/L
CO2 SERPL-SCNC: 32 MMOL/L
CREAT SERPL-MCNC: 2.1 MG/DL
CREAT SERPL-MCNC: 2.2 MG/DL
CREAT SERPL-MCNC: 2.2 MG/DL
DELSYS: ABNORMAL
DEPRECATED S PNEUM 1 IGG SER-MCNC: >126 MCG/ML
DEPRECATED S PNEUM12 IGG SER-MCNC: <0.3 MCG/ML
DEPRECATED S PNEUM14 IGG SER-MCNC: 0.4 MCG/ML
DEPRECATED S PNEUM19 IGG SER-MCNC: 2.9 MCG/ML
DEPRECATED S PNEUM23 IGG SER-MCNC: 2.1 MCG/ML
DEPRECATED S PNEUM3 IGG SER-MCNC: 32.3 MCG/ML
DEPRECATED S PNEUM4 IGG SER-MCNC: 7.7 MCG/ML
DEPRECATED S PNEUM5 IGG SER-MCNC: 7.3 MCG/ML
DEPRECATED S PNEUM8 IGG SER-MCNC: 0.6 MCG/ML
DEPRECATED S PNEUM9 IGG SER-MCNC: 0.4 MCG/ML
DIFFERENTIAL METHOD: ABNORMAL
EOSINOPHIL # BLD AUTO: 0 K/UL
EOSINOPHIL NFR BLD: 0 %
ERYTHROCYTE [DISTWIDTH] IN BLOOD BY AUTOMATED COUNT: 16.5 %
ERYTHROCYTE [SEDIMENTATION RATE] IN BLOOD BY WESTERGREN METHOD: 26 MM/H
ERYTHROCYTE [SEDIMENTATION RATE] IN BLOOD BY WESTERGREN METHOD: 30 MM/H
ERYTHROCYTE [SEDIMENTATION RATE] IN BLOOD BY WESTERGREN METHOD: 30 MM/H
EST. GFR  (AFRICAN AMERICAN): 23.7 ML/MIN/1.73 M^2
EST. GFR  (AFRICAN AMERICAN): 23.7 ML/MIN/1.73 M^2
EST. GFR  (AFRICAN AMERICAN): 25.1 ML/MIN/1.73 M^2
EST. GFR  (NON AFRICAN AMERICAN): 20.6 ML/MIN/1.73 M^2
EST. GFR  (NON AFRICAN AMERICAN): 20.6 ML/MIN/1.73 M^2
EST. GFR  (NON AFRICAN AMERICAN): 21.8 ML/MIN/1.73 M^2
FIO2: 40
FIO2: 50
FIO2: 50
FIO2: 60
GLUCOSE SERPL-MCNC: 139 MG/DL
GLUCOSE SERPL-MCNC: 142 MG/DL
GLUCOSE SERPL-MCNC: 149 MG/DL
HCO3 UR-SCNC: 28.9 MMOL/L (ref 24–28)
HCO3 UR-SCNC: 29.3 MMOL/L (ref 24–28)
HCO3 UR-SCNC: 30.9 MMOL/L (ref 24–28)
HCO3 UR-SCNC: 31.6 MMOL/L (ref 24–28)
HCO3 UR-SCNC: 32.1 MMOL/L (ref 24–28)
HCO3 UR-SCNC: 32.9 MMOL/L (ref 24–28)
HCT VFR BLD AUTO: 24.4 %
HGB BLD-MCNC: 7.7 G/DL
IMM GRANULOCYTES # BLD AUTO: 0.28 K/UL
IMM GRANULOCYTES NFR BLD AUTO: 1.4 %
INR PPP: 1.1
LYMPHOCYTES # BLD AUTO: 0.5 K/UL
LYMPHOCYTES NFR BLD: 2.4 %
MAGNESIUM SERPL-MCNC: 2.5 MG/DL
MCH RBC QN AUTO: 23.9 PG
MCHC RBC AUTO-ENTMCNC: 31.6 G/DL
MCV RBC AUTO: 76 FL
MODE: ABNORMAL
MONOCYTES # BLD AUTO: 0.9 K/UL
MONOCYTES NFR BLD: 4.4 %
NEUTROPHILS # BLD AUTO: 17.9 K/UL
NEUTROPHILS NFR BLD: 91.7 %
NRBC BLD-RTO: 0 /100 WBC
PCO2 BLDA: 39 MMHG (ref 35–45)
PCO2 BLDA: 41.1 MMHG (ref 35–45)
PCO2 BLDA: 42.3 MMHG (ref 35–45)
PCO2 BLDA: 45.8 MMHG (ref 35–45)
PCO2 BLDA: 46.6 MMHG (ref 35–45)
PCO2 BLDA: 46.7 MMHG (ref 35–45)
PEEP: 8
PH SMN: 7.42 [PH] (ref 7.35–7.45)
PH SMN: 7.43 [PH] (ref 7.35–7.45)
PH SMN: 7.46 [PH] (ref 7.35–7.45)
PH SMN: 7.46 [PH] (ref 7.35–7.45)
PH SMN: 7.48 [PH] (ref 7.35–7.45)
PH SMN: 7.52 [PH] (ref 7.35–7.45)
PHOSPHATE SERPL-MCNC: 5.3 MG/DL
PLATELET # BLD AUTO: 340 K/UL
PMV BLD AUTO: 10.5 FL
PO2 BLDA: 54 MMHG (ref 80–100)
PO2 BLDA: 56 MMHG (ref 80–100)
PO2 BLDA: 56 MMHG (ref 80–100)
PO2 BLDA: 57 MMHG (ref 80–100)
PO2 BLDA: 69 MMHG (ref 80–100)
PO2 BLDA: 75 MMHG (ref 80–100)
POC BE: 5 MMOL/L
POC BE: 5 MMOL/L
POC BE: 7 MMOL/L
POC BE: 8 MMOL/L
POC BE: 9 MMOL/L
POC BE: 9 MMOL/L
POC SATURATED O2: 89 % (ref 95–100)
POC SATURATED O2: 89 % (ref 95–100)
POC SATURATED O2: 91 % (ref 95–100)
POC SATURATED O2: 92 % (ref 95–100)
POC SATURATED O2: 94 % (ref 95–100)
POC SATURATED O2: 95 % (ref 95–100)
POC TCO2: 30 MMOL/L (ref 23–27)
POC TCO2: 31 MMOL/L (ref 23–27)
POC TCO2: 32 MMOL/L (ref 23–27)
POC TCO2: 33 MMOL/L (ref 23–27)
POC TCO2: 33 MMOL/L (ref 23–27)
POC TCO2: 34 MMOL/L (ref 23–27)
POCT GLUCOSE: 108 MG/DL (ref 70–110)
POCT GLUCOSE: 138 MG/DL (ref 70–110)
POCT GLUCOSE: 160 MG/DL (ref 70–110)
POCT GLUCOSE: 177 MG/DL (ref 70–110)
POTASSIUM SERPL-SCNC: 5.1 MMOL/L
POTASSIUM SERPL-SCNC: 5.2 MMOL/L
POTASSIUM SERPL-SCNC: 5.3 MMOL/L
PROT SERPL-MCNC: 5.5 G/DL
PROTHROMBIN TIME: 11.6 SEC
RBC # BLD AUTO: 3.22 M/UL
S PNEUM DA 18C IGG SER-MCNC: 3.3 MCG/ML
S PNEUM DA 6B IGG SER-MCNC: 2.8 MCG/ML
S PNEUM DA 7F IGG SER-MCNC: 40.1 MCG/ML
S PNEUM DA 9V IGG SER-MCNC: 17.7 MCG/ML
SAMPLE: ABNORMAL
SITE: ABNORMAL
SODIUM SERPL-SCNC: 142 MMOL/L
SODIUM SERPL-SCNC: 143 MMOL/L
SODIUM SERPL-SCNC: 144 MMOL/L
SP02: 91
SP02: 91
SP02: 95
TRIGL SERPL-MCNC: 52 MG/DL
VT: 370
WBC # BLD AUTO: 19.56 K/UL

## 2018-04-16 PROCEDURE — 20000000 HC ICU ROOM

## 2018-04-16 PROCEDURE — 25000003 PHARM REV CODE 250: Performed by: STUDENT IN AN ORGANIZED HEALTH CARE EDUCATION/TRAINING PROGRAM

## 2018-04-16 PROCEDURE — 94003 VENT MGMT INPAT SUBQ DAY: CPT

## 2018-04-16 PROCEDURE — 99900026 HC AIRWAY MAINTENANCE (STAT)

## 2018-04-16 PROCEDURE — 63600175 PHARM REV CODE 636 W HCPCS: Performed by: INTERNAL MEDICINE

## 2018-04-16 PROCEDURE — 63600175 PHARM REV CODE 636 W HCPCS: Performed by: NURSE PRACTITIONER

## 2018-04-16 PROCEDURE — 83735 ASSAY OF MAGNESIUM: CPT

## 2018-04-16 PROCEDURE — 82803 BLOOD GASES ANY COMBINATION: CPT

## 2018-04-16 PROCEDURE — 99233 SBSQ HOSP IP/OBS HIGH 50: CPT | Mod: GC,,, | Performed by: INTERNAL MEDICINE

## 2018-04-16 PROCEDURE — 94761 N-INVAS EAR/PLS OXIMETRY MLT: CPT

## 2018-04-16 PROCEDURE — 25000003 PHARM REV CODE 250: Performed by: NURSE PRACTITIONER

## 2018-04-16 PROCEDURE — 25000242 PHARM REV CODE 250 ALT 637 W/ HCPCS: Performed by: INTERNAL MEDICINE

## 2018-04-16 PROCEDURE — 84100 ASSAY OF PHOSPHORUS: CPT

## 2018-04-16 PROCEDURE — 80076 HEPATIC FUNCTION PANEL: CPT

## 2018-04-16 PROCEDURE — 99233 SBSQ HOSP IP/OBS HIGH 50: CPT | Mod: ,,, | Performed by: INTERNAL MEDICINE

## 2018-04-16 PROCEDURE — 25000003 PHARM REV CODE 250: Performed by: INTERNAL MEDICINE

## 2018-04-16 PROCEDURE — 97803 MED NUTRITION INDIV SUBSEQ: CPT

## 2018-04-16 PROCEDURE — 80048 BASIC METABOLIC PNL TOTAL CA: CPT | Mod: 91

## 2018-04-16 PROCEDURE — 85025 COMPLETE CBC W/AUTO DIFF WBC: CPT

## 2018-04-16 PROCEDURE — 85610 PROTHROMBIN TIME: CPT

## 2018-04-16 PROCEDURE — 84478 ASSAY OF TRIGLYCERIDES: CPT

## 2018-04-16 PROCEDURE — 99900035 HC TECH TIME PER 15 MIN (STAT)

## 2018-04-16 PROCEDURE — 94640 AIRWAY INHALATION TREATMENT: CPT

## 2018-04-16 PROCEDURE — 37799 UNLISTED PX VASCULAR SURGERY: CPT

## 2018-04-16 PROCEDURE — 80048 BASIC METABOLIC PNL TOTAL CA: CPT

## 2018-04-16 PROCEDURE — 99291 CRITICAL CARE FIRST HOUR: CPT | Mod: ,,, | Performed by: NURSE PRACTITIONER

## 2018-04-16 RX ORDER — FUROSEMIDE 10 MG/ML
80 INJECTION INTRAMUSCULAR; INTRAVENOUS ONCE
Status: COMPLETED | OUTPATIENT
Start: 2018-04-16 | End: 2018-04-16

## 2018-04-16 RX ORDER — ATOVAQUONE 750 MG/5ML
1500 SUSPENSION ORAL DAILY
Status: DISCONTINUED | OUTPATIENT
Start: 2018-04-16 | End: 2018-04-22

## 2018-04-16 RX ORDER — VANCOMYCIN/0.9 % SOD CHLORIDE 1 G/100 ML
1000 PLASTIC BAG, INJECTION (ML) INTRAVENOUS ONCE
Status: CANCELLED | OUTPATIENT
Start: 2018-04-16

## 2018-04-16 RX ADMIN — CHLORHEXIDINE GLUCONATE 15 ML: 1.2 RINSE ORAL at 09:04

## 2018-04-16 RX ADMIN — IPRATROPIUM BROMIDE AND ALBUTEROL SULFATE 3 ML: .5; 3 SOLUTION RESPIRATORY (INHALATION) at 03:04

## 2018-04-16 RX ADMIN — PIPERACILLIN AND TAZOBACTAM 4.5 G: 4; .5 INJECTION, POWDER, LYOPHILIZED, FOR SOLUTION INTRAVENOUS; PARENTERAL at 06:04

## 2018-04-16 RX ADMIN — STANDARDIZED SENNA CONCENTRATE AND DOCUSATE SODIUM 1 TABLET: 8.6; 5 TABLET, FILM COATED ORAL at 09:04

## 2018-04-16 RX ADMIN — PIPERACILLIN AND TAZOBACTAM 4.5 G: 4; .5 INJECTION, POWDER, LYOPHILIZED, FOR SOLUTION INTRAVENOUS; PARENTERAL at 01:04

## 2018-04-16 RX ADMIN — MINERAL OIL AND WHITE PETROLATUM: 150; 830 OINTMENT OPHTHALMIC at 06:04

## 2018-04-16 RX ADMIN — DEXTROSE 300 MG: 5 SOLUTION INTRAVENOUS at 09:04

## 2018-04-16 RX ADMIN — HEPARIN SODIUM 5000 UNITS: 5000 INJECTION, SOLUTION INTRAVENOUS; SUBCUTANEOUS at 09:04

## 2018-04-16 RX ADMIN — HEPARIN SODIUM 5000 UNITS: 5000 INJECTION, SOLUTION INTRAVENOUS; SUBCUTANEOUS at 01:04

## 2018-04-16 RX ADMIN — PANTOPRAZOLE SODIUM 40 MG: 40 GRANULE, DELAYED RELEASE ORAL at 09:04

## 2018-04-16 RX ADMIN — INSULIN ASPART 3 UNITS: 100 INJECTION, SOLUTION INTRAVENOUS; SUBCUTANEOUS at 03:04

## 2018-04-16 RX ADMIN — ATOVAQUONE 1500 MG: 750 SUSPENSION ORAL at 01:04

## 2018-04-16 RX ADMIN — METHYLPREDNISOLONE SODIUM SUCCINATE 80 MG: 125 INJECTION, POWDER, FOR SOLUTION INTRAMUSCULAR; INTRAVENOUS at 09:04

## 2018-04-16 RX ADMIN — IPRATROPIUM BROMIDE AND ALBUTEROL SULFATE 3 ML: .5; 3 SOLUTION RESPIRATORY (INHALATION) at 11:04

## 2018-04-16 RX ADMIN — FUROSEMIDE 80 MG: 10 INJECTION, SOLUTION INTRAMUSCULAR; INTRAVENOUS at 03:04

## 2018-04-16 RX ADMIN — FUROSEMIDE 80 MG: 10 INJECTION, SOLUTION INTRAMUSCULAR; INTRAVENOUS at 06:04

## 2018-04-16 RX ADMIN — MINERAL OIL AND WHITE PETROLATUM: 150; 830 OINTMENT OPHTHALMIC at 02:04

## 2018-04-16 RX ADMIN — Medication 125 MCG/HR: at 01:04

## 2018-04-16 RX ADMIN — HEPARIN SODIUM 5000 UNITS: 5000 INJECTION, SOLUTION INTRAVENOUS; SUBCUTANEOUS at 06:04

## 2018-04-16 RX ADMIN — MINERAL OIL AND WHITE PETROLATUM: 150; 830 OINTMENT OPHTHALMIC at 09:04

## 2018-04-16 RX ADMIN — PIPERACILLIN AND TAZOBACTAM 4.5 G: 4; .5 INJECTION, POWDER, LYOPHILIZED, FOR SOLUTION INTRAVENOUS; PARENTERAL at 09:04

## 2018-04-16 RX ADMIN — IPRATROPIUM BROMIDE AND ALBUTEROL SULFATE 3 ML: .5; 3 SOLUTION RESPIRATORY (INHALATION) at 07:04

## 2018-04-16 RX ADMIN — CLOPIDOGREL 75 MG: 75 TABLET, FILM COATED ORAL at 09:04

## 2018-04-16 RX ADMIN — INSULIN ASPART 3 UNITS: 100 INJECTION, SOLUTION INTRAVENOUS; SUBCUTANEOUS at 09:04

## 2018-04-16 RX ADMIN — INSULIN ASPART 2 UNITS: 100 INJECTION, SOLUTION INTRAVENOUS; SUBCUTANEOUS at 09:04

## 2018-04-16 RX ADMIN — POLYETHYLENE GLYCOL 3350 17 G: 17 POWDER, FOR SOLUTION ORAL at 09:04

## 2018-04-16 RX ADMIN — PROPOFOL 15 MCG/KG/MIN: 10 INJECTION, EMULSION INTRAVENOUS at 09:04

## 2018-04-16 RX ADMIN — INSULIN ASPART 2 UNITS: 100 INJECTION, SOLUTION INTRAVENOUS; SUBCUTANEOUS at 03:04

## 2018-04-16 RX ADMIN — HYDROXYCHLOROQUINE SULFATE 400 MG: 200 TABLET, FILM COATED ORAL at 09:04

## 2018-04-16 NOTE — ASSESSMENT & PLAN NOTE
KERMIT non oliguric stage 2 by KDIGO, suspect multifactorial tubular injury suspecting ATN from hypoperfusion, bactrim and Vancomycin in conjunction with Pip/Tazo in setting of contrast exposure. 72 Hrs after exposure her sCr rised by 0.3 mg/dL. Suspect all this factors contributed to her KERMIT    · Kyle 83,   · UPCR undetectable, wrights stain negative  · US renal Normal size kidneys withR kidney simple cyst no hydronephrosis seen  · Adequate rsponse to diuretics. Would Give Furosemide 80 mg BID or TID base on UO goal for ~ 3 lts/day to maintain negative status  · Please keep SBP > 90 and MAP > 65  · Another possible injury is ischemia she has decrease her Hbg since admission no in the mid 7's range. Keep Hbg > 7 please  · Consider close monitor of Vanc Through levels if there is another option for Pip/Tazo with Vanc combination would probably be less toxic to kidneys  · Bactrim stopped today  · Trend electrolytes   · continue to monitor strict I/O's, daily weights, renally dose medications, and avoid nephrotoxic agents

## 2018-04-16 NOTE — ASSESSMENT & PLAN NOTE
Most likely secondary to KERMIT and Bactrim use in addition to high dose steroids.  - would recommend to switch Bactrim to other agent for PPX   - Continue Furosemide as seems to be helping K improved but still levated  - consider changing tube feeds to renal formula.

## 2018-04-16 NOTE — SUBJECTIVE & OBJECTIVE
Interval History: Stable but still critically ill.  NO new issues - less pressor support and lower O2 requirements this AM     Review of Systems   Unable to obtain   Objective:     Vital Signs (Most Recent):  Temp: 99.3 °F (37.4 °C) (04/15/18 1900)  Pulse: 85 (04/15/18 2318)  Resp: (!) 30 (04/15/18 2318)  BP: (!) 95/53 (04/15/18 2300)  SpO2: 95 % (04/15/18 2318) Vital Signs (24h Range):  Temp:  [98.4 °F (36.9 °C)-99.3 °F (37.4 °C)] 99.3 °F (37.4 °C)  Pulse:  [78-90] 85  Resp:  [30-38] 30  SpO2:  [89 %-97 %] 95 %  BP: ()/(44-55) 95/53  Arterial Line BP: ()/(32-50) 101/50     Weight: 71.5 kg (157 lb 10.1 oz)  Body mass index is 26.23 kg/m².    Estimated Creatinine Clearance: 21.2 mL/min (A) (based on SCr of 2.1 mg/dL (H)).    Physical Exam   Constitutional: She appears well-developed. No distress.   Intubated/sedated.   HENT:   Head: Atraumatic.   Mouth/Throat: Oropharynx is clear and moist. No oropharyngeal exudate.   Eyes: Conjunctivae and EOM are normal. Pupils are equal, round, and reactive to light. No scleral icterus.   Neck: Neck supple.   Cardiovascular: Normal rate and regular rhythm.  Exam reveals no friction rub.    No murmur heard.  Pulmonary/Chest: No respiratory distress. She has no wheezes. She has rales. She exhibits no tenderness.   On vent, 60% FiO2.   Abdominal: Soft. Bowel sounds are normal. She exhibits no distension. There is no tenderness. There is no rebound and no guarding.   Musculoskeletal: Normal range of motion. She exhibits no edema.   Lymphadenopathy:     She has no cervical adenopathy.   Neurological:   Intubated/sedated.   Skin: No rash noted. No erythema.     Significant Labs: All pertinent labs within the past 24 hours have been reviewed.    Significant Imaging: I have reviewed all pertinent imaging results/findings within the past 24 hours.

## 2018-04-16 NOTE — ASSESSMENT & PLAN NOTE
--Suspect secondary to severe bilateral pneumonia in the setting of immunosuppression; possibly  vs reaction from Humira   --Severely worsened over first week of admission despite CAP treatment.  --s/p emergent intubation on 4/11 upon MICU admission for severe hypoxemia and severe respiratory distress.  --Continue mechanical ventilation, wean support as tolerated.  --Bronchoscopy on 4/11 with wash; culture with few WBCs, no organisms.  --Antibiotics broadened to vancomycin, zosyn, bactrim, and posaconazole initally; appreciate ID assistance with management. Will continue zosyn, posiconazole for now; change bactrim to atovoquone for PCP prophylaxis given KERMIT  --Blood, sputum, and urine cultures negative.   --KOH prep negative, AFB stain with no acid fast bacilli noted, fungal culture pending  --Aspergillus, Histoplasma, and Cryptococcal antigen in blood is negative. Legionella and blasomyces negative.   --Cytology and viral panel pending.  --Continue antibiotics and IV steroids. Preliminary GMS stain negative, Bactrim (treatment dose) discontinued 4/13.  Remains on prophylaxis dose of atovaquone now   --P/F ratio remains around 100; Low peep as she has not been peep responsive.   --CXR with mild improvement however continues to require significant oxygen.  --continue lasix as tolerated

## 2018-04-16 NOTE — PROGRESS NOTES
[facility]  Infectious Disease  Progress Note    Patient Name: Selma Alonzo Lux MD  MRN: [id number]  Admission Date: 4/5/2018  Length of Stay: 10 days  Attending Physician: Patricia Kirkpatrick MD  Primary Care Provider: Bhargav Hirsch MD    Isolation Status: No active isolations  Assessment/Plan:      * Acute hypoxemic respiratory failure    81yo woman w/a history of prior CVA, RA (dx 2012; RF seropositive, erosive; on HCQ/prednisone 5mg and recently started humira first dose 3/22/2018), IBS, and Hasimotos thyroiditis (goiter) who was admitted on 4/5/2018 with 2 days of dry cough and progressive SOB due to multifocal PNA (with peripheral and basilar lesions noted on CT chest) that has progressively worsened despite CAP coverage x5 days, requiring intubation on 4/12 for hypoxic RF. She remains critically ill but has plateaued clinically on expanded coverage for all possibilities on differential of hypoxic RF (most notable for MDRO bacterial HCAP/HAP, IFI including Aspergillus and endemic fungi with PCP less likely given steroid dose, and  vs hypersensitivity pneumonitis). Course c/b KERMIT and given that PCP is low on the differential (given CT chest appearance and relatively low steroid dose of 5mg daily), would stop bactrim with 1 negative GMS stain already completed.    - would continue coverage for HCAP with vanc/zosyn while awaiting pending cultures (vanc trough goal 15) -- she has completed 5 days of atypical coverage with azithromycin already; assuming negative cultures at 72h, can stop vancomycin  - will continue posaconazole for coverage of the above mold species and await pending fungal markers  - would stop high dose bactrim today and transition to prophylaxis (while on high dose steroids)   - may continue steroids for now while awaiting final pathology  - await pending BAL cultures, cytology, and fungal markers  - will tailor therapy with you as she improves    No evidence of a  staph infection - ID thanks you for stopping vancomycin today  Keep on zosyn and posiconazole  - stable today as well             Anticipated Disposition:     Thank you for your consult. I will follow-up with patient. Please contact us if you have any additional questions.    Jered Ford MD  Infectious Disease  [facility]    Subjective:     Principal Problem:Acute hypoxemic respiratory failure    HPI: No notes on file  Interval History: Stable but still critically ill.  NO new issues - less pressor support and lower O2 requirements this AM     Review of Systems   Unable to obtain   Objective:     Vital Signs (Most Recent):  Temp: 99.3 °F (37.4 °C) (04/15/18 1900)  Pulse: 85 (04/15/18 2318)  Resp: (!) 30 (04/15/18 2318)  BP: (!) 95/53 (04/15/18 2300)  SpO2: 95 % (04/15/18 2318) Vital Signs (24h Range):  Temp:  [98.4 °F (36.9 °C)-99.3 °F (37.4 °C)] 99.3 °F (37.4 °C)  Pulse:  [78-90] 85  Resp:  [30-38] 30  SpO2:  [89 %-97 %] 95 %  BP: ()/(44-55) 95/53  Arterial Line BP: ()/(32-50) 101/50     Weight: 71.5 kg (157 lb 10.1 oz)  Body mass index is 26.23 kg/m².    Estimated Creatinine Clearance: 21.2 mL/min (A) (based on SCr of 2.1 mg/dL (H)).    Physical Exam   Constitutional: She appears well-developed. No distress.   Intubated/sedated.   HENT:   Head: Atraumatic.   Mouth/Throat: Oropharynx is clear and moist. No oropharyngeal exudate.   Eyes: Conjunctivae and EOM are normal. Pupils are equal, round, and reactive to light. No scleral icterus.   Neck: Neck supple.   Cardiovascular: Normal rate and regular rhythm.  Exam reveals no friction rub.    No murmur heard.  Pulmonary/Chest: No respiratory distress. She has no wheezes. She has rales. She exhibits no tenderness.   On vent, 60% FiO2.   Abdominal: Soft. Bowel sounds are normal. She exhibits no distension. There is no tenderness. There is no rebound and no guarding.   Musculoskeletal: Normal range of motion. She exhibits no edema.    Lymphadenopathy:     She has no cervical adenopathy.   Neurological:   Intubated/sedated.   Skin: No rash noted. No erythema.     Significant Labs: All pertinent labs within the past 24 hours have been reviewed.    Significant Imaging: I have reviewed all pertinent imaging results/findings within the past 24 hours.

## 2018-04-16 NOTE — SUBJECTIVE & OBJECTIVE
Interval History: NAEON, good response to Furosemide Challenge with good UO 2760 ml/24hrs . Hemodynamically improving off pressors. CXR with some improvement but still has evidence of pulm edema. Stable sCr 2.1 mg/dL but K still eelvated at 5.2. Bactrim stopped today. She still has edema and evidence of Volume overload.    Review of patient's allergies indicates:  No Known Allergies  Current Facility-Administered Medications   Medication Frequency    acetaminophen tablet 650 mg Q8H PRN    albuterol-ipratropium 2.5mg-0.5mg/3mL nebulizer solution 3 mL Q4H PRN    albuterol-ipratropium 2.5mg-0.5mg/3mL nebulizer solution 3 mL Q4H    atovaquone suspension 1,500 mg Daily    chlorhexidine 0.12 % solution 15 mL BID    clopidogrel tablet 75 mg QHS    dextrose 50% injection 12.5 g PRN    fentaNYL 2500 mcg in 0.9% sodium chloride 250 mL infusion premix (titrating) Continuous    fentaNYL injection 50 mcg Q2H PRN    furosemide injection 80 mg Once    glucagon (human recombinant) injection 1 mg PRN    heparin (porcine) injection 5,000 Units Q8H    hydroxychloroquine tablet 400 mg Daily    insulin aspart U-100 pen 1-10 Units Q4H PRN    insulin aspart U-100 pen 3 Units Q24H    insulin aspart U-100 pen 3 Units Q24H    insulin aspart U-100 pen 3 Units Q24H    insulin aspart U-100 pen 3 Units Q24H    insulin aspart U-100 pen 3 Units Q24H    insulin aspart U-100 pen 3 Units Q24H    magnesium sulfate 2g in water 50mL IVPB (premix) PRN    magnesium sulfate 2g in water 50mL IVPB (premix) PRN    methylPREDNISolone sodium succinate injection 80 mg Q12H    pantoprazole suspension 40 mg Daily    piperacillin-tazobactam 4.5 g in sodium chloride 0.9% 100 mL IVPB (ready to mix system) Q8H    polyethylene glycol packet 17 g Daily    posaconazole 300 mg in dextrose 5 % 150 mL infusion Daily    potassium, sodium phosphates 280-160-250 mg packet 2 packet PRN    potassium, sodium phosphates 280-160-250 mg packet 2 packet  PRN    potassium, sodium phosphates 280-160-250 mg packet 2 packet PRN    propofol (DIPRIVAN) 10 mg/mL infusion Continuous    senna-docusate 8.6-50 mg per tablet 1 tablet BID    sodium chloride 0.9% flush 5 mL PRN    white petrolatum-mineral oil (LUBIFRESH P.M.) ophthalmic ointment Q8H       Objective:     Vital Signs (Most Recent):  Temp: 97.9 °F (36.6 °C) (04/16/18 1100)  Pulse: 90 (04/16/18 1400)  Resp: (!) 56 (04/16/18 1400)  BP: (!) 112/55 (04/16/18 1400)  SpO2: (!) 90 % (04/16/18 1400)  O2 Device (Oxygen Therapy): ventilator (04/16/18 1337) Vital Signs (24h Range):  Temp:  [97.9 °F (36.6 °C)-99.3 °F (37.4 °C)] 97.9 °F (36.6 °C)  Pulse:  [78-90] 90  Resp:  [0-56] 56  SpO2:  [87 %-96 %] 90 %  BP: ()/(47-55) 112/55  Arterial Line BP: ()/(36-59) 102/38     Weight: 71.5 kg (157 lb 10.1 oz) (04/16/18 1400)  Body mass index is 26.23 kg/m².  Body surface area is 1.81 meters squared.    I/O last 3 completed shifts:  In: 3019.6 [I.V.:904.6; NG/GT:1665; IV Piggyback:450]  Out: 3240 [Urine:3240]    Physical Exam   Constitutional: She appears well-developed and well-nourished. She appears ill. No distress. She is sedated and intubated.   HENT:   Head: Normocephalic and atraumatic.   Eyes: Conjunctivae are normal. Pupils are equal, round, and reactive to light.   Neck: Trachea normal. Neck supple. No JVD present.   Cardiovascular: Normal rate, regular rhythm, S1 normal, S2 normal, intact distal pulses and normal pulses.  Exam reveals no gallop and no friction rub.    No murmur heard.  Pulmonary/Chest: She is intubated. She has decreased breath sounds in the right lower field and the left lower field. She has no wheezes. She has rales in the right middle field, the right lower field, the left middle field and the left lower field.   Abdominal: Soft. Bowel sounds are normal. She exhibits no distension. There is no tenderness.   Musculoskeletal: Normal range of motion. She exhibits edema (trace pitting edema  LE and sacral area).   Ne[facility]rological:   [facility]   Skin: Skin is warm and dry. Capillary refill takes less than 2 seconds.   Psychiatric:   Sedated     Vitals reviewed.      Significant Labs:  ABGs:   Recent Labs  Lab 04/16/18  0922   PH 7.430   PCO2 46.6*   HCO3 30.9*   POCSATURATED 89*   BE 7     BMP:   Recent Labs  Lab 04/16/18  0357   *      CO2 26   BUN 80*   CREATININE 2.1*   CALCIUM 8.0*   MG 2.5     Cardiac Markers: No results for input(s): CKMB, TROPONINT, MYOGLOBIN in the last 168 hours.  CBC:   Recent Labs  Lab 04/16/18  0357   WBC 19.56*   RBC 3.22*   HGB 7.7*   HCT 24.4*      MCV 76*   MCH 23.9*   MCHC 31.6*       Recent Labs  Lab 04/15/18  1407   COLORU Straw   SPECGRAV 1.010   PHUR 5.0   PROTEINUA Negative   NITRITE Negative   LEUKOCYTESUR Negative   UROBILINOGEN Negative   HYALINECASTS 16*     All labs within the past 24 hours have been reviewed.     Significant Imaging:  Labs: Reviewed  US: Reviewed

## 2018-04-16 NOTE — PROGRESS NOTES
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 She was seen by her PCP on 4/2 for these complaints and received a prescription for duo nebs and tessalon perles. CXR performed on 4/2 noted bilateral basilar infiltrates. At that time, she denied fever, chills, sputum production, sick contacts. She was admitted to  and treated emprically for CAP with rocephin and azithromycin. She was also diuresed daily. However, her oxygen requirements slowly increased from 2 L NC to 4 L NC with also progressively worsening bilateral infiltrates on imaging. CTA performed on 4/9 negative for PE, however noted significant bilateral consolidations. On 4/11, her hypoxemia continued to worsen, requiring NRB mask, and she developed severe respiratory distress. She was transferred to the MICU and emergently intubated. Repeat CT chest performed on 4/11 demonstrated progressive worsening of bilateral peripheral infiltrates.    Hospital/ICU Course:  Admitted to MICU on 4/11, intubated emergently for severe hypoxemia and respiratory distress. ID was consulted for assistance in management given immunocompromised state. Patient paralyzed with nimbex following persistent dysynchrony with vent despite sedation. Abx broadened to vancomycin, zosyn, bactrim, and posaconazole. Norepinephrine initiated for BP support.  Bronchoscopy performed at bedside on 4/11.   Nimbex discontinued on 4/12. She continues to require significant oxygen support, and unable to wean oxygen requirements significantly since 4/12.     Interval History/Significant Events: No acute events overnight. Weaned from levophed; oxygen requirements remain unchanged    Review of Systems   Unable to perform ROS: Intubated     Objective:     Vital Signs (Most Recent):  Temp: 97.9 °F (36.6 °C) (04/16/18 1100)  Pulse: 88 (04/16/18 1300)  Resp: (!) 46 (04/16/18 1300)  BP: (!) 109/53 (04/16/18 1300)  SpO2: (!) 89 % (04/16/18 1300) Vital Signs (24h Range):  Temp:  [97.9 °F (36.6 °C)-99.3 °F (37.4 °C)] 97.9 °F (36.6 °C)  Pulse:  [78-89] 88  Resp:  [0-46] 46  SpO2:  [87 %-96 %] 89 %  BP: ()/(47-55) 109/53  Arterial Line BP: ()/(36-59) 102/38   Weight: 71.5 kg (157 lb 10.1 oz)  Body mass index is 26.23 kg/m².      Intake/Output Summary (Last 24 hours) at 04/16/18 1324  Last data filed at 04/16/18 1300   Gross per 24 hour   Intake          1665.37 ml   Output             3580 ml   Net         -1914.63 ml       Physical Exam   Constitutional: She appears ill. No distress. She is intubated.   HENT:   Head: Normocephalic and atraumatic.   Eyes: Conjunctivae are normal. Right eye exhibits no discharge and no exudate. Left eye exhibits no discharge and no exudate. No scleral icterus. Right eye exhibits no nystagmus. Left eye exhibits no nystagmus.   Neck: No tracheal deviation present.   Cardiovascular: Normal rate, regular rhythm, normal heart sounds and intact distal pulses.  PMI is not displaced.    No murmur heard.  Pulses:       Radial pulses are 2+ on the right side, and 2+ on the left side.        Dorsalis pedis pulses are 2+ on the right side, and 2+ on the left side.   Warm extremities   Pulmonary/Chest: Effort normal. No accessory muscle usage. Tachypnea noted. She is intubated. No respiratory distress. She has no decreased breath sounds. She has no wheezes.  She has no rhonchi. She has rales in the right middle field, the right lower field, the left middle field and the left lower field.   Abdominal: Soft. Normal appearance and bowel sounds are normal.   Lymphadenopathy:     She has no cervical adenopathy.   Neurological: GCS eye subscore is 3. GCS verbal subscore is 1. GCS motor subscore is 6.   On sedation. Eyes closed. Pupils 3 mm equal, round and reactive to light.  No gaze deviation, nystagmus or roving eye movements.  +cough and gag reflex.  No spontaneous movement in any extremity.    Skin: Skin is warm, dry and intact. She is not diaphoretic.   Nursing note and vitals reviewed.      Vents:  Vent Mode: A/C (04/16/18 1124)  Set Rate: 26 bmp (04/16/18 1124)  Vt Set: 370 mL (04/16/18 1124)  Pressure Support: 0 cmH20 (04/16/18 1124)  PEEP/CPAP: 8 cmH20 (04/16/18 1124)  Oxygen Concentration (%): 50 (04/16/18 1300)  Peak Airway Pressure: 31 cmH2O (04/16/18 1124)  Plateau Pressure: 26 cmH20 (04/16/18 1124)  Total Ve: 12.4 mL (04/16/18 1124)  F/VT Ratio<105 (RSBI): (!) 77.67 (04/16/18 1124)  Lines/Drains/Airways     Central Venous Catheter Line                 Percutaneous Central Line Insertion/Assessment - triple lumen  04/11/18 1513 right subclavian 4 days          Drain                 NG/OG Tube 04/11/18 1340 Left mouth 4 days         Urethral Catheter 04/11/18 1430 Double-lumen 16 Fr. 4 days         Rectal Tube 04/16/18 0600 rectal tube w/ balloon (indicate number of mLs) less than 1 day          Airway                 Airway - Non-Surgical 04/11/18 1337 Endotracheal Tube 4 days          Arterial Line                 Arterial Line 04/11/18 1540 Right Radial 4 days              Significant Labs:    CBC/Anemia Profile:    Recent Labs  Lab 04/15/18  0250 04/16/18  0357   WBC 21.69* 19.56*   HGB 8.1* 7.7*   HCT 25.8* 24.4*    340   MCV 77* 76*   RDW 16.7* 16.5*        Chemistries:    Recent Labs  Lab 04/15/18  0250  04/15/18  1801 04/15/18  2340 04/16/18  0357      < > 140 143 142   K 5.4*  < > 5.1 5.1 5.2*     < > 106 105 104   CO2 22*  < > 25 26 26   BUN 59*  < > 2* 81* 80*   CREATININE 2.0*  < > 2.1* 2.2* 2.1*   CALCIUM 8.2*  < > 8.0* 8.2* 8.0*   ALBUMIN 1.6*  --   --   --  1.5*   PROT 5.8*  --   --   --  5.5*   BILITOT 0.2  --   --   --  0.2   ALKPHOS 92  --   --   --  74   ALT 17  --   --   --  15   AST 15  --   --   --  25   MG 2.7*  --   --   --  2.5   PHOS 5.2*  --   --   --  5.3*   < > = values in this interval not displayed.    All pertinent labs within the past 24 hours have been reviewed.    Significant Imaging:  I have reviewed and interpreted all pertinent imaging results/findings within the past 24 hours.    Assessment/Plan:     Neuro   Cerebrovascular accident (CVA)    --Remote lacunar infarcts in the right centrum semi-ovale, basal ganglia, and right tatyana per CTH w/o contrast on 12/2017.  --Continue home clopidogrel.        Encephalopathy, metabolic    --suspect secondary to sedatives for ventilator dyssynchrony and critical illness.    --non-focal on exam on 4/14. SAT again today           Pulmonary   * Acute hypoxemic respiratory failure    --Suspect secondary to severe bilateral pneumonia in the setting of immunosuppression; possibly  vs reaction from Humira   --Severely worsened over first week of admission despite CAP treatment.  --s/p emergent intubation on 4/11 upon MICU admission for severe hypoxemia and severe respiratory distress.  --Continue mechanical ventilation, wean support as tolerated.  --Bronchoscopy on 4/11 with wash; culture with few WBCs, no organisms.  --Antibiotics broadened to vancomycin, zosyn, bactrim, and posaconazole initally; appreciate ID assistance with management. Will continue zosyn, posiconazole for now; change bactrim to atovoquone for PCP prophylaxis given KERMIT  --Blood, sputum, and urine cultures negative.   --KOH prep negative, AFB stain with no acid fast bacilli noted, fungal culture  "pending  --Aspergillus, Histoplasma, and Cryptococcal antigen in blood is negative. Legionella and blasomyces negative.   --Cytology and viral panel pending.  --Continue antibiotics and IV steroids. Preliminary GMS stain negative, Bactrim (treatment dose) discontinued 4/13.  Remains on prophylaxis dose of atovaquone now   --P/F ratio remains around 100; Low peep as she has not been peep responsive.   --CXR with mild improvement however continues to require significant oxygen.  --continue lasix as tolerated           Pneumonia of both upper lobes due to infectious organism    --see above        ARDS (adult respiratory distress syndrome)    - See "Acute hypoxemic respiratory failure".        Cardiac/Vascular   Hypertension, essential    --Holding home medications in setting of shock.        Renal/   Hyperkalemia    --trend        KERMIT (acute kidney injury)    --sCr increased to 1.9, with baseline 0.9 on admission.  --possibly secondary to Bactrim vs ATN (bactrim d/c'ed)  --FEUrea 46%, clinically hypervolemic on exam with edema in BLE and plump IVC.   --appreciate nephrology assistance  --responding well to lasix; continue  --trend BMP's        ID   Septic shock    --Likely secondary to pneumonia with iatrogenic component given amount of sedation need to keep the patient synchronous with ventilator.  --Started on norepinephrine and titrating to MAP >65; wean as tolerated..  --Continue antibiotics. Lactate wnl.        Endocrine   On tube feeding diet    --appreciate nutrition assistance.  --tolerating enteral feedings at goal        Thyroid enlargement    --History of Hashimoto's thyroiditis.  --Intrathoracic extension on CT.  --TSH and free T4 wnl.          Hyperglycemia    --Likely steroid-induced.  --Started on insulin gtt; now transitioned to SQ insulin.  --Goal -180.        GI   PUD (peptic ulcer disease)    --Continue PPI.        Orthopedic   Seropositive rheumatoid arthritis of multiple sites    " --Appreciate Rheumatology assistance.  --Continue hydroxychloroquine.  --Hold home prednisone; switched to IV SoluMedrol 80mg Q12h.        Other   Long term (current) use of systemic steroids    - See above            Critical Care Time: 60 minutes  Critical secondary to Patient has a condition that poses threat to life and bodily function: respiratory failure, ARDS       Critical care was time spent personally by me on the following activities: development of treatment plan with patient or surrogate and bedside caregivers, discussions with consultants, evaluation of patient's response to treatment, examination of patient, ordering and performing treatments and interventions, ordering and review of laboratory studies, ordering and review of radiographic studies, pulse oximetry, re-evaluation of patient's condition. This critical care time did not overlap with that of any other provider or involve time for any procedures.     Carina Rodney NP  Critical Care Medicine  [facility]

## 2018-04-16 NOTE — ASSESSMENT & PLAN NOTE
81yo woman w/a history of prior CVA, RA (dx 2012; RF seropositive, erosive; on HCQ/prednisone 5mg and recently started humira first dose 3/22/2018), IBS, and Hasimotos thyroiditis (goiter) who was admitted on 4/5/2018 with 2 days of dry cough and progressive SOB due to multifocal PNA (with peripheral and basilar lesions noted on CT chest) that has progressively worsened despite CAP coverage x5 days, requiring intubation on 4/12 for hypoxic RF. She remains critically ill but has plateaued clinically on expanded coverage for all possibilities on differential of hypoxic RF (most notable for MDRO bacterial HCAP/HAP, IFI including Aspergillus and endemic fungi with PCP less likely given steroid dose, and  vs hypersensitivity pneumonitis). Course c/b KERMIT and given that PCP is low on the differential (given CT chest appearance and relatively low steroid dose of 5mg daily), would stop bactrim with 1 negative GMS stain already completed.    -continue pip-tazo at this time.   - will continue posaconazole for coverage of the above mold species and await pending fungal markers  - bactrim transitioned to ppx dose  - may continue steroids for now while awaiting final pathology  - await pending BAL cultures, cytology, and fungal markers  - will tailor therapy with you as she improves

## 2018-04-16 NOTE — ASSESSMENT & PLAN NOTE
--suspect secondary to sedatives for ventilator dyssynchrony and critical illness.    --non-focal on exam on 4/14. SAT again today

## 2018-04-16 NOTE — PROGRESS NOTES
[facility]  Infectious Disease  Progress Note    Patient Name: Selma Alonzo Lux MD  MRN: [id number]  Admission Date: 4/5/2018  Length of Stay: 11 days  Attending Physician: Raffaele Almonte*  Primary Care Provider: Bhargav Hirsch MD    Isolation Status: No active isolations  Assessment/Plan:      * Acute hypoxemic respiratory failure    81yo woman w/a history of prior CVA, RA (dx 2012; RF seropositive, erosive; on HCQ/prednisone 5mg and recently started humira first dose 3/22/2018), IBS, and Hasimotos thyroiditis (goiter) who was admitted on 4/5/2018 with 2 days of dry cough and progressive SOB due to multifocal PNA (with peripheral and basilar lesions noted on CT chest) that has progressively worsened despite CAP coverage x5 days, requiring intubation on 4/12 for hypoxic RF. She remains critically ill but has plateaued clinically on expanded coverage for all possibilities on differential of hypoxic RF (most notable for MDRO bacterial HCAP/HAP, IFI including Aspergillus and endemic fungi with PCP less likely given steroid dose, and  vs hypersensitivity pneumonitis). Course c/b KERMIT and given that PCP is low on the differential (given CT chest appearance and relatively low steroid dose of 5mg daily), would stop bactrim with 1 negative GMS stain already completed.    -continue pip-tazo at this time.   - will continue posaconazole for coverage of the above mold species and await pending fungal markers  - bactrim transitioned to ppx dose  - may continue steroids for now while awaiting final pathology  - await pending BAL cultures, cytology, and fungal markers  - will tailor therapy with you as she improves              Anticipated Disposition: As per primary team    Thank you for your consult. I will follow-up with patient. Please contact us if you have any additional questions.    Keegan Aldrich MD, PhD  Infectious Disease, PGY-4  [facility]    Subjective:      Principal Problem:Acute hypoxemic respiratory failure    HPI: No notes on file  Interval History: Patient stable but severely ill - still intubated - no new issuestoday     Review of Systems   Unable to perform ROS: Intubated     Objective:     Vital Signs (Most Recent):  Temp: 97.2 °F (36.2 °C) (04/16/18 1500)  Pulse: 96 (04/16/18 1707)  Resp: (!) 30 (04/16/18 1707)  BP: (!) 119/58 (04/16/18 1534)  SpO2: (!) 89 % (04/16/18 1707) Vital Signs (24h Range):  Temp:  [97.2 °F (36.2 °C)-99.3 °F (37.4 °C)] 97.2 °F (36.2 °C)  Pulse:  [79-96] 96  Resp:  [0-56] 30  SpO2:  [87 %-96 %] 89 %  BP: ()/(47-58) 119/58  Arterial Line BP: ()/(36-59) 106/41     Weight: 71.5 kg (157 lb 10.1 oz)  Body mass index is 26.23 kg/m².    Estimated Creatinine Clearance: 20.2 mL/min (A) (based on SCr of 2.2 mg/dL (H)).    Physical Exam    Constitutional: She appears well-developed. No distress.   Intubated/sedated.   HENT:   Head: Atraumatic.   Mouth/Throat: Oropharynx is clear and moist. No oropharyngeal exudate.   Eyes: Conjunctivae and EOM are normal. Pupils are equal, round, and reactive to light. No scleral icterus.   Neck: Neck supple.   Cardiovascular: Normal rate and regular rhythm.  Exam reveals no friction rub.    No murmur heard.  Pulmonary/Chest: No respiratory distress. She has no wheezes. She has rales. She exhibits no tenderness.   On vent, 50% FiO2.   Abdominal: Soft. Bowel sounds are normal. She exhibits no distension. There is no tenderness. There is no rebound and no guarding.   Musculoskeletal: Normal range of motion. She exhibits no edema.   Lymphadenopathy:     She has no cervical adenopathy.   Neurological:   Intubated/sedated.   Skin: No rash noted. No erythema.       Significant Labs: All pertinent labs within the past 24 hours have been reviewed.    Significant Imaging: I have reviewed all pertinent imaging results/findings within the past 24 hours.

## 2018-04-16 NOTE — SUBJECTIVE & OBJECTIVE
Interval History/Significant Events: No acute events overnight. Weaned from levophed; oxygen requirements remain unchanged    Review of Systems   Unable to perform ROS: Intubated     Objective:     Vital Signs (Most Recent):  Temp: 97.9 °F (36.6 °C) (04/16/18 1100)  Pulse: 88 (04/16/18 1300)  Resp: (!) 46 (04/16/18 1300)  BP: (!) 109/53 (04/16/18 1300)  SpO2: (!) 89 % (04/16/18 1300) Vital Signs (24h Range):  Temp:  [97.9 °F (36.6 °C)-99.3 °F (37.4 °C)] 97.9 °F (36.6 °C)  Pulse:  [78-89] 88  Resp:  [0-46] 46  SpO2:  [87 %-96 %] 89 %  BP: ()/(47-55) 109/53  Arterial Line BP: ()/(36-59) 102/38   Weight: 71.5 kg (157 lb 10.1 oz)  Body mass index is 26.23 kg/m².      Intake/Output Summary (Last 24 hours) at 04/16/18 1324  Last data filed at 04/16/18 1300   Gross per 24 hour   Intake          1665.37 ml   Output             3580 ml   Net         -1914.63 ml       Physical Exam   Constitutional: She appears ill. No distress. She is intubated.   HENT:   Head: Normocephalic and atraumatic.   Eyes: Conjunctivae are normal. Right eye exhibits no discharge and no exudate. Left eye exhibits no discharge and no exudate. No scleral icterus. Right eye exhibits no nystagmus. Left eye exhibits no nystagmus.   Neck: No tracheal deviation present.   Cardiovascular: Normal rate, regular rhythm, normal heart sounds and intact distal pulses.  PMI is not displaced.    No murmur heard.  Pulses:       Radial pulses are 2+ on the right side, and 2+ on the left side.        Dorsalis pedis pulses are 2+ on the right side, and 2+ on the left side.   Warm extremities   Pulmonary/Chest: Effort normal. No accessory muscle usage. Tachypnea noted. She is intubated. No respiratory distress. She has no decreased breath sounds. She has no wheezes. She has no rhonchi. She has rales in the right middle field, the right lower field, the left middle field and the left lower field.   Abdominal: Soft. Normal appearance and bowel sounds are  normal.   Lymphadenopathy:     She has no cervical adenopathy.   Neurological: GCS eye subscore is 3. GCS verbal subscore is 1. GCS motor subscore is 6.   On sedation. Eyes closed. Pupils 3 mm equal, round and reactive to light.  No gaze deviation, nystagmus or roving eye movements.  +cough and gag reflex.  No spontaneous movement in any extremity.    Skin: Skin is warm, dry and intact. She is not diaphoretic.   Nursing note and vitals reviewed.      Vents:  Vent Mode: A/C (04/16/18 1124)  Set Rate: 26 bmp (04/16/18 1124)  Vt Set: 370 mL (04/16/18 1124)  Pressure Support: 0 cmH20 (04/16/18 1124)  PEEP/CPAP: 8 cmH20 (04/16/18 1124)  Oxygen Concentration (%): 50 (04/16/18 1300)  Peak Airway Pressure: 31 cmH2O (04/16/18 1124)  Plateau Pressure: 26 cmH20 (04/16/18 1124)  Total Ve: 12.4 mL (04/16/18 1124)  F/VT Ratio<105 (RSBI): (!) 77.67 (04/16/18 1124)  Lines/Drains/Airways     Central Venous Catheter Line                 Percutaneous Central Line Insertion/Assessment - triple lumen  04/11/18 1513 right subclavian 4 days          Drain                 NG/OG Tube 04/11/18 1340 Left mouth 4 days         Urethral Catheter 04/11/18 1430 Double-lumen 16 Fr. 4 days         Rectal Tube 04/16/18 0600 rectal tube w/ balloon (indicate number of mLs) less than 1 day          Airway                 Airway - Non-Surgical 04/11/18 1337 Endotracheal Tube 4 days          Arterial Line                 Arterial Line 04/11/18 1540 Right Radial 4 days              Significant Labs:    CBC/Anemia Profile:    Recent Labs  Lab 04/15/18  0250 04/16/18  0357   WBC 21.69* 19.56*   HGB 8.1* 7.7*   HCT 25.8* 24.4*    340   MCV 77* 76*   RDW 16.7* 16.5*        Chemistries:    Recent Labs  Lab 04/15/18  0250  04/15/18  1801 04/15/18  2340 04/16/18  0357     < > 140 143 142   K 5.4*  < > 5.1 5.1 5.2*     < > 106 105 104   CO2 22*  < > 25 26 26   BUN 59*  < > 2* 81* 80*   CREATININE 2.0*  < > 2.1* 2.2* 2.1*   CALCIUM 8.2*  < > 8.0*  8.2* 8.0*   ALBUMIN 1.6*  --   --   --  1.5*   PROT 5.8*  --   --   --  5.5*   BILITOT 0.2  --   --   --  0.2   ALKPHOS 92  --   --   --  74   ALT 17  --   --   --  15   AST 15  --   --   --  25   MG 2.7*  --   --   --  2.5   PHOS 5.2*  --   --   --  5.3*   < > = values in this interval not displayed.    All pertinent labs within the past 24 hours have been reviewed.    Significant Imaging:  I have reviewed and interpreted all pertinent imaging results/findings within the past 24 hours.

## 2018-04-16 NOTE — PLAN OF CARE
Patient remains intubated and sedated.  Levo weaned overnight.  Plan for continued diuresis and monitoring of kidney function as well as vent weaning.  Patient inappropriate for disposition planning at this time, CM will continue to follow.     04/16/18 1410   Right Care Assessment   Can the patient answer the patient profile reliably? No, cognitively impaired   How often would a person be available to care for the patient? Often   Describe the patient's ability to walk at the present time. Does not walk or unable to take any steps at all   How does the patient rate their overall health at the present time? (KERRY)   Number of comorbid conditions (as recorded on the chart) One   During the past month, has the patient often been bothered by feeling down, depressed or hopeless? (KERRY)   During the past month, has the patient often been bothered by little interest or pleasure in doing things? (KERRY)   Kayleen Arellano RN, BSN  Case Management  [facility]  Ext. [phone]

## 2018-04-16 NOTE — SUBJECTIVE & OBJECTIVE
Interval History: Patient stable but severely ill - still intubated - no new issuestoday     Review of Systems   Unable to perform ROS: Intubated     Objective:     Vital Signs (Most Recent):  Temp: 97.2 °F (36.2 °C) (04/16/18 1500)  Pulse: 96 (04/16/18 1707)  Resp: (!) 30 (04/16/18 1707)  BP: (!) 119/58 (04/16/18 1534)  SpO2: (!) 89 % (04/16/18 1707) Vital Signs (24h Range):  Temp:  [97.2 °F (36.2 °C)-99.3 °F (37.4 °C)] 97.2 °F (36.2 °C)  Pulse:  [79-96] 96  Resp:  [0-56] 30  SpO2:  [87 %-96 %] 89 %  BP: ()/(47-58) 119/58  Arterial Line BP: ()/(36-59) 106/41     Weight: 71.5 kg (157 lb 10.1 oz)  Body mass index is 26.23 kg/m².    Estimated Creatinine Clearance: 20.2 mL/min (A) (based on SCr of 2.2 mg/dL (H)).    Physical Exam    Constitutional: She appears well-developed. No distress.   Intubated/sedated.   HENT:   Head: Atraumatic.   Mouth/Throat: Oropharynx is clear and moist. No oropharyngeal exudate.   Eyes: Conjunctivae and EOM are normal. Pupils are equal, round, and reactive to light. No scleral icterus.   Neck: Neck supple.   Cardiovascular: Normal rate and regular rhythm.  Exam reveals no friction rub.    No murmur heard.  Pulmonary/Chest: No respiratory distress. She has no wheezes. She has rales. She exhibits no tenderness.   On vent, 50% FiO2.   Abdominal: Soft. Bowel sounds are normal. She exhibits no distension. There is no tenderness. There is no rebound and no guarding.   Musculoskeletal: Normal range of motion. She exhibits no edema.   Lymphadenopathy:     She has no cervical adenopathy.   Neurological:   Intubated/sedated.   Skin: No rash noted. No erythema.       Significant Labs: All pertinent labs within the past 24 hours have been reviewed.    Significant Imaging: I have reviewed all pertinent imaging results/findings within the past 24 hours.

## 2018-04-16 NOTE — PROGRESS NOTES
"  [facility]  Adult Nutrition  Consult Note    SUMMARY     Recommendations    1. If renal function continues to decline, change TF regimen to Novasource @ 30 mL/hr & add 3 packs Beneprotein to meet 102% EEN, 100% EPN.    - Hold for residuals >500 mL; additional fluid per MD.  2. If able to extubate & advance diet, recommend renal diet (texture per SLP).   3. RD to monitor & follow-up.    Goals: Pt to receive >85% EEN and EPN  Nutrition Goal Status: goal met  Communication of RD Recs: reviewed with RN    Reason for Assessment    Reason for Assessment: RD follow-up  Diagnosis: other (see comments) (respiratory failure)  Relevant Medical History: RA, anemia, arthritis, HTN, stroke, hasimoto's  Interdisciplinary Rounds: attended    General Information Comments: Pt remains intubated, sedated. Tolerating current TF regimen.  Nutrition Discharge Planning: Unable to determine    Nutrition/Diet History    Typical Food/Fluid Intake: Pt NPO on mech vent. Pt's dtgr at bedside reports she believes pt was tolerating adequate po intake prior to rapid response.  Food Preferences: No Gnosticism/cultural food preferences at this time  Do you have any cultural, spiritual, Gnosticism conflicts, given your current situation?: none stated  Factors Affecting Nutritional Intake: on mechanical ventilation, NPO    Anthropometrics    Temp: 97.9 °F (36.6 °C)  Height Method: Stated  Height: 5' 5" (165.1 cm)  Height (inches): 65 in  Weight Method: Bed Scale  Weight: 71.5 kg (157 lb 10.1 oz)  Weight (lb): 157.63 lb  Ideal Body Weight (IBW), Female: 125 lb  % Ideal Body Weight, Female (lb): 126.1 lb  BMI (Calculated): 26.3  BMI Grade: 25 - 29.9 - overweight    Lab/Procedures/Meds    Pertinent Labs Reviewed: reviewed  Pertinent Labs Comments: K 5.2, BUN 80, Creat 2.1, GFR 25.1, P 5.3, A1C 5.5  Pertinent Medications Reviewed: reviewed  Pertinent Medications Comments: Fentanyl, Propofol    Physical Findings/Assessment    Overall " Physical Appearance: nourished, overweight, on ventilator support  Tubes: orogastric tube  Skin: intact    Estimated/Assessed Needs    Weight Used For Calorie Calculations: 68 kg (149 lb 14.6 oz)  Energy Calorie Requirements (kcal): 1481 kcal/d  Energy Need Method: [facility] [address] [facility]     Protein Requirements: 82-103g (1.2-1.5g/kg)  Weight Used For Protein Calculations: 68 kg (149 lb 14.6 oz)     Fluid Requirements (mL): 1mL/kcal or per MD    Nutrition Prescription Ordered    Current Diet Order: NPO  Current Nutrition Support Formula Ordered: Glucerna 1.5  Current Nutrition Support Rate Ordered: 40 mL/hr    Evaluation of Received Nutrient/Fluid Intake    Enteral Calories (kcal): 1440  Enteral Protein (gm): 79  Enteral (Free Water) Fluid (mL): 729    % Kcal Needs: 97%  % Protein Needs: 96%    Energy Calories Required: meeting needs  Protein Required: meeting needs  Fluid Required: other (see comments) (per MD)    Tolerance: tolerating    Nutrition Risk    Level of Risk/Frequency of Follow-up: moderate     Assessment and Plan    Nutrition Problem  Inadequate energy intake     Related to (etiology):   TF provision     Signs and Symptoms (as evidenced by):   Pt receiving <85% EEN and EPN.     Nutrition Diagnosis Status:   Improving     Monitor and Evaluation    Food and Nutrient Intake: energy intake, food and beverage intake, enteral nutrition intake  Food and Nutrient Adminstration: diet order, enteral and parenteral nutrition administration  Physical Activity and Function: nutrition-related ADLs and IADLs  Anthropometric Measurements: weight, weight change  Biochemical Data, Medical Tests and Procedures: electrolyte and renal panel, gastrointestinal profile, glucose/endocrine profile, inflammatory profile, lipid profile  Nutrition-Focused Physical Findings: overall appearance     Nutrition Follow-Up    RD Follow-up?: Yes

## 2018-04-16 NOTE — PROGRESS NOTES
[facility]  Nephrology  Progress Note    Patient Name: Selma Lux MD  MRN: [id number]  Admission Date: 4/5/2018  Hospital Length of Stay: 11 days  Attending Provider: Raffaele Almonte*   Primary Care Physician: Bhargav Hirsch MD  Principal Problem:Acute hypoxemic respiratory failure    Subjective:     HPI: Dr. Selma Lux MD  is a 83 yo AA female with a PMHx relevant for CVA, RA on plaquenil/prednisone and recently started on Humiria (3/22), HTN, and large goiter who is admitted to MICU acute respiratory failure from a bilateral pneumonia on [facility] ventilation and paralyzed suspected ARDS, after she presented [place] [facility] on [place]5 with complaints of continually worsening shortness of breath, cough, and fatigue that started about 2 weeks prior. She has a complicated hospital stay as she was admitted to  with bilateral basilar infiltrates and was transferred to MICU after needeing higher level of care secondary to hypoxic respiratory failure. She had been treated with very broad spectrum abx secondary to her immunocompromise state with ID following vancomycin, zosyn, bactrim, and posaconazole. She became hemodynamically unstable and required vasopressors, Norepinephrine initiated for BP support suspected secondary to sedation by primary team. Bronchoscopy performed at bedside on 4/11. Nimbex has been discontinued on 4/12. She continues to require significant oxygen support. On 4/8 she had Chest CTA done. Nephrology consulted for KERMIT and hyperkalemia. She has a sCr baseline of 0.8-0.9 mg/dL. Her sCr started to rise on 4/11 night and has steadily been rising since. She had also been on trimethoprim and had a 75 ml exposure to iodine contrast in setting of hypotension requiring pressors. She was weaned of Norepi early this am but her SBP dropped to 86 but her MAP remained at 65. She was restarted back on Norepi this am. She has been treated with Vancomcyn (Through levels 24  and ~ 25) and Pip/Tazo, Plus therapeutic dose of bactrim, now on prophylaxis dose. She has been diuresis through her Hospital stay with Furosemide different doses. She had elevation of her CBC about 3-4 day ago with no evidence of a rash. This am She was given 80 mg of Furosemide IV with increase in her UO. Her sCr peaked at 2.0 and her latest sCr was 1.9 mg/dL.    Interval History: NAEON, good response to Furosemide Challenge with good UO 2760 ml/24hrs . Hemodynamically improving off pressors. CXR with some improvement but still has evidence of pulm edema. Stable sCr 2.1 mg/dL but K still eelvated at 5.2. Bactrim stopped today. She still has edema and evidence of Volume overload.    Review of patient's allergies indicates:  No Known Allergies  Current Facility-Administered Medications   Medication Frequency    acetaminophen tablet 650 mg Q8H PRN    albuterol-ipratropium 2.5mg-0.5mg/3mL nebulizer solution 3 mL Q4H PRN    albuterol-ipratropium 2.5mg-0.5mg/3mL nebulizer solution 3 mL Q4H    atovaquone suspension 1,500 mg Daily    chlorhexidine 0.12 % solution 15 mL BID    clopidogrel tablet 75 mg QHS    dextrose 50% injection 12.5 g PRN    fentaNYL 2500 mcg in 0.9% sodium chloride 250 mL infusion premix (titrating) Continuous    fentaNYL injection 50 mcg Q2H PRN    furosemide injection 80 mg Once    glucagon (human recombinant) injection 1 mg PRN    heparin (porcine) injection 5,000 Units Q8H    hydroxychloroquine tablet 400 mg Daily    insulin aspart U-100 pen 1-10 Units Q4H PRN    insulin aspart U-100 pen 3 Units Q24H    insulin aspart U-100 pen 3 Units Q24H    insulin aspart U-100 pen 3 Units Q24H    insulin aspart U-100 pen 3 Units Q24H    insulin aspart U-100 pen 3 Units Q24H    insulin aspart U-100 pen 3 Units Q24H    magnesium sulfate 2g in water 50mL IVPB (premix) PRN    magnesium sulfate 2g in water 50mL IVPB (premix) PRN    methylPREDNISolone sodium succinate injection 80 mg Q12H     pantoprazole suspension 40 mg Daily    piperacillin-tazobactam 4.5 g in sodium chloride 0.9% 100 mL IVPB (ready to mix system) Q8H    polyethylene glycol packet 17 g Daily    posaconazole 300 mg in dextrose 5 % 150 mL infusion Daily    potassium, sodium phosphates 280-160-250 mg packet 2 packet PRN    potassium, sodium phosphates 280-160-250 mg packet 2 packet PRN    potassium, sodium phosphates 280-160-250 mg packet 2 packet PRN    propofol (DIPRIVAN) 10 mg/mL infusion Continuous    senna-docusate 8.6-50 mg per tablet 1 tablet BID    sodium chloride 0.9% flush 5 mL PRN    white petrolatum-mineral oil (LUBIFRESH P.M.) ophthalmic ointment Q8H       Objective:     Vital Signs (Most Recent):  Temp: 97.9 °F (36.6 °C) (04/16/18 1100)  Pulse: 90 (04/16/18 1400)  Resp: (!) 56 (04/16/18 1400)  BP: (!) 112/55 (04/16/18 1400)  SpO2: (!) 90 % (04/16/18 1400)  O2 Device (Oxygen Therapy): ventilator (04/16/18 1337) Vital Signs (24h Range):  Temp:  [97.9 °F (36.6 °C)-99.3 °F (37.4 °C)] 97.9 °F (36.6 °C)  Pulse:  [78-90] 90  Resp:  [0-56] 56  SpO2:  [87 %-96 %] 90 %  BP: ()/(47-55) 112/55  Arterial Line BP: ()/(36-59) 102/38     Weight: 71.5 kg (157 lb 10.1 oz) (04/16/18 1400)  Body mass index is 26.23 kg/m².  Body surface area is 1.81 meters squared.    I/O last 3 completed shifts:  In: 3019.6 [I.V.:904.6; NG/GT:1665; IV Piggyback:450]  Out: 3240 [Urine:3240]    Physical Exam   Constitutional: She appears well-developed and well-nourished. She appears ill. No distress. She is sedated and intubated.   HENT:   Head: Normocephalic and atraumatic.   Eyes: Conjunctivae are normal. Pupils are equal, round, and reactive to light.   Neck: Trachea normal. Neck supple. No JVD present.   Cardiovascular: Normal rate, regular rhythm, S1 normal, S2 normal, intact distal pulses and normal pulses.  Exam reveals no gallop and no friction rub.    No murmur heard.  Pulmonary/Chest: She is intubated. She has decreased breath  sounds in the right lower field and the left lower field. She has no wheezes. She has rales in the right middle field, the right lower field, the left middle field and the left lower field.   Abdominal: Soft. Bowel sounds are normal. She exhibits no distension. There is no tenderness.   Musculoskeletal: Normal range of motion. She exhibits edema (trace pitting edema LE and sacral area).   Neurological:   Sedated on mech ventilation   Skin: Skin is warm and dry. Capillary refill takes less than 2 seconds.   Psychiatric:   Sedated     Vitals reviewed.      Significant Labs:  ABGs:   Recent Labs  Lab 04/16/18  0922   PH 7.430   PCO2 46.6*   HCO3 30.9*   POCSATURATED 89*   BE 7     BMP:   Recent Labs  Lab 04/16/18  0357   *      CO2 26   BUN 80*   CREATININE 2.1*   CALCIUM 8.0*   MG 2.5     Cardiac Markers: No results for input(s): CKMB, TROPONINT, MYOGLOBIN in the last 168 hours.  CBC:   Recent Labs  Lab 04/16/18  0357   WBC 19.56*   RBC 3.22*   HGB 7.7*   HCT 24.4*      MCV 76*   MCH 23.9*   MCHC 31.6*       Recent Labs  Lab 04/15/18  1407   COLORU Straw   SPECGRAV 1.010   PHUR 5.0   PROTEINUA Negative   NITRITE Negative   LEUKOCYTESUR Negative   UROBILINOGEN Negative   HYALINECASTS 16*     All labs within the past 24 hours have been reviewed.     Significant Imaging:  Labs: Reviewed  US: Reviewed    Assessment/Plan:     KERMIT (acute kidney injury)    KERMIT non oliguric stage 2 by KDIGO, suspect multifactorial tubular injury suspecting ATN from hypoperfusion, bactrim and Vancomycin in conjunction with Pip/Tazo in setting of contrast exposure. 72 Hrs after exposure her sCr rised by 0.3 mg/dL. Suspect all this factors contributed to her KERMIT    · Kyle 83,   · UPCR undetectable, wrights stain negative  · US renal Normal size kidneys withR kidney simple cyst no hydronephrosis seen  · Adequate rsponse to diuretics. Would Give Furosemide 80 mg BID or TID base on UO goal for ~ 3 lts/day to maintain negative  status  · Please keep SBP > 90 and MAP > 65  · Another possible injury is ischemia she has decrease her Hbg since admission no in the mid 7's range. Keep Hbg > 7 please  · Consider close monitor of Vanc Through levels if there is another option for Pip/Tazo with Vanc combination would probably be less toxic to kidneys  · Bactrim stopped today  · Trend electrolytes   · continue to monitor strict I/O's, daily weights, renally dose medications, and avoid nephrotoxic agents                Hyperkalemia    Most likely secondary to KERMIT and Bactrim use in addition to high dose steroids.  - would recommend to switch Bactrim to other agent for PPX   - Continue Furosemide as seems to be helping K improved but still levated  - consider changing tube feeds to renal formula.            Thank you for your consult. I will follow-up with patient. Please contact us if you have any additional questions.    Melecio Macias MD  Nephrology  [facility]

## 2018-04-16 NOTE — ASSESSMENT & PLAN NOTE
81yo woman w/a history of prior CVA, RA (dx 2012; RF seropositive, erosive; on HCQ/prednisone 5mg and recently started humira first dose 3/22/2018), IBS, and Hasimotos thyroiditis (goiter) who was admitted on 4/5/2018 with 2 days of dry cough and progressive SOB due to multifocal PNA (with peripheral and basilar lesions noted on CT chest) that has progressively worsened despite CAP coverage x5 days, requiring intubation on 4/12 for hypoxic RF. She remains critically ill but has plateaued clinically on expanded coverage for all possibilities on differential of hypoxic RF (most notable for MDRO bacterial HCAP/HAP, IFI including Aspergillus and endemic fungi with PCP less likely given steroid dose, and  vs hypersensitivity pneumonitis). Course c/b KERMIT and given that PCP is low on the differential (given CT chest appearance and relatively low steroid dose of 5mg daily), would stop bactrim with 1 negative GMS stain already completed.    - would continue coverage for HCAP with vanc/zosyn while awaiting pending cultures (vanc trough goal 15) -- she has completed 5 days of atypical coverage with azithromycin already; assuming negative cultures at 72h, can stop vancomycin  - will continue posaconazole for coverage of the above mold species and await pending fungal markers  - would stop high dose bactrim today and transition to prophylaxis (while on high dose steroids)   - may continue steroids for now while awaiting final pathology  - await pending BAL cultures, cytology, and fungal markers  - will tailor therapy with you as she improves    No evidence of a staph infection - ID thanks you for stopping vancomycin today  Keep on zosyn and posiconazole  - stable today as well

## 2018-04-16 NOTE — ASSESSMENT & PLAN NOTE
--sCr increased to 1.9, with baseline 0.9 on admission.  --possibly secondary to Bactrim vs ATN (bactrim d/c'ed)  --FEUrea 46%, clinically hypervolemic on exam with edema in BLE and plump IVC.   --appreciate nephrology assistance  --responding well to lasix; continue  --trend BMP's

## 2018-04-17 LAB
ALBUMIN SERPL BCP-MCNC: 1.8 G/DL
ALBUMIN SERPL BCP-MCNC: 1.8 G/DL
ALLENS TEST: ABNORMAL
ALP SERPL-CCNC: 81 U/L
ALP SERPL-CCNC: 91 U/L
ALT SERPL W/O P-5'-P-CCNC: 15 U/L
ALT SERPL W/O P-5'-P-CCNC: 17 U/L
ANION GAP SERPL CALC-SCNC: 10 MMOL/L
ANION GAP SERPL CALC-SCNC: 11 MMOL/L
ANION GAP SERPL CALC-SCNC: 12 MMOL/L
ANION GAP SERPL CALC-SCNC: 9 MMOL/L
AST SERPL-CCNC: 15 U/L
AST SERPL-CCNC: 16 U/L
BASOPHILS # BLD AUTO: 0.02 K/UL
BASOPHILS NFR BLD: 0.1 %
BILIRUB DIRECT SERPL-MCNC: 0.3 MG/DL
BILIRUB SERPL-MCNC: 0.4 MG/DL
BILIRUB SERPL-MCNC: 0.5 MG/DL
BUN SERPL-MCNC: 100 MG/DL
BUN SERPL-MCNC: 78 MG/DL
BUN SERPL-MCNC: 80 MG/DL
BUN SERPL-MCNC: 90 MG/DL
CALCIUM SERPL-MCNC: 8.4 MG/DL
CALCIUM SERPL-MCNC: 8.6 MG/DL
CALCIUM SERPL-MCNC: 9 MG/DL
CALCIUM SERPL-MCNC: 9.4 MG/DL
CHLORIDE SERPL-SCNC: 104 MMOL/L
CHLORIDE SERPL-SCNC: 107 MMOL/L
CHLORIDE SERPL-SCNC: 108 MMOL/L
CHLORIDE SERPL-SCNC: 110 MMOL/L
CO2 SERPL-SCNC: 30 MMOL/L
CO2 SERPL-SCNC: 31 MMOL/L
CO2 SERPL-SCNC: 32 MMOL/L
CO2 SERPL-SCNC: 34 MMOL/L
CREAT SERPL-MCNC: 1.5 MG/DL
CREAT SERPL-MCNC: 1.5 MG/DL
CREAT SERPL-MCNC: 1.7 MG/DL
CREAT SERPL-MCNC: 2.1 MG/DL
DELSYS: ABNORMAL
DIFFERENTIAL METHOD: ABNORMAL
EOSINOPHIL # BLD AUTO: 0 K/UL
EOSINOPHIL NFR BLD: 0 %
ERYTHROCYTE [DISTWIDTH] IN BLOOD BY AUTOMATED COUNT: 17.1 %
ERYTHROCYTE [SEDIMENTATION RATE] IN BLOOD BY WESTERGREN METHOD: 16 MM/H
ERYTHROCYTE [SEDIMENTATION RATE] IN BLOOD BY WESTERGREN METHOD: 26 MM/H
EST. GFR  (AFRICAN AMERICAN): 25.1 ML/MIN/1.73 M^2
EST. GFR  (AFRICAN AMERICAN): 32.4 ML/MIN/1.73 M^2
EST. GFR  (AFRICAN AMERICAN): 37.7 ML/MIN/1.73 M^2
EST. GFR  (AFRICAN AMERICAN): 37.7 ML/MIN/1.73 M^2
EST. GFR  (NON AFRICAN AMERICAN): 21.8 ML/MIN/1.73 M^2
EST. GFR  (NON AFRICAN AMERICAN): 28.1 ML/MIN/1.73 M^2
EST. GFR  (NON AFRICAN AMERICAN): 32.7 ML/MIN/1.73 M^2
EST. GFR  (NON AFRICAN AMERICAN): 32.7 ML/MIN/1.73 M^2
FIO2: 40
FIO2: 50
FIO2: 60
FIO2: 60
GLUCOSE SERPL-MCNC: 152 MG/DL
GLUCOSE SERPL-MCNC: 157 MG/DL
GLUCOSE SERPL-MCNC: 161 MG/DL
GLUCOSE SERPL-MCNC: 186 MG/DL
HCO3 UR-SCNC: 32.9 MMOL/L (ref 24–28)
HCO3 UR-SCNC: 33.2 MMOL/L (ref 24–28)
HCO3 UR-SCNC: 33.5 MMOL/L (ref 24–28)
HCO3 UR-SCNC: 33.9 MMOL/L (ref 24–28)
HCO3 UR-SCNC: 35.5 MMOL/L (ref 24–28)
HCO3 UR-SCNC: 38.1 MMOL/L (ref 24–28)
HCT VFR BLD AUTO: 28.6 %
HGB BLD-MCNC: 9 G/DL
IMM GRANULOCYTES # BLD AUTO: 0.53 K/UL
IMM GRANULOCYTES NFR BLD AUTO: 2.5 %
INR PPP: 1.2
LYMPHOCYTES # BLD AUTO: 0.7 K/UL
LYMPHOCYTES NFR BLD: 3.5 %
MAGNESIUM SERPL-MCNC: 2.3 MG/DL
MCH RBC QN AUTO: 23.9 PG
MCHC RBC AUTO-ENTMCNC: 31.5 G/DL
MCV RBC AUTO: 76 FL
MIN VOL: 14.5
MIN VOL: 14.6
MODE: ABNORMAL
MONOCYTES # BLD AUTO: 0.8 K/UL
MONOCYTES NFR BLD: 3.8 %
NEUTROPHILS # BLD AUTO: 18.7 K/UL
NEUTROPHILS NFR BLD: 90.1 %
NRBC BLD-RTO: 0 /100 WBC
PCO2 BLDA: 39.8 MMHG (ref 35–45)
PCO2 BLDA: 40.4 MMHG (ref 35–45)
PCO2 BLDA: 40.9 MMHG (ref 35–45)
PCO2 BLDA: 41.5 MMHG (ref 35–45)
PCO2 BLDA: 41.8 MMHG (ref 35–45)
PCO2 BLDA: 47.6 MMHG (ref 35–45)
PEEP: 8
PH SMN: 7.48 [PH] (ref 7.35–7.45)
PH SMN: 7.51 [PH] (ref 7.35–7.45)
PH SMN: 7.51 [PH] (ref 7.35–7.45)
PH SMN: 7.52 [PH] (ref 7.35–7.45)
PH SMN: 7.54 [PH] (ref 7.35–7.45)
PH SMN: 7.57 [PH] (ref 7.35–7.45)
PHOSPHATE SERPL-MCNC: 3.7 MG/DL
PIP: 41
PIP: 43
PLATELET # BLD AUTO: 425 K/UL
PMV BLD AUTO: 10.7 FL
PO2 BLDA: 59 MMHG (ref 80–100)
PO2 BLDA: 63 MMHG (ref 80–100)
PO2 BLDA: 65 MMHG (ref 80–100)
PO2 BLDA: 68 MMHG (ref 80–100)
PO2 BLDA: 74 MMHG (ref 80–100)
PO2 BLDA: 78 MMHG (ref 80–100)
POC BE: 10 MMOL/L
POC BE: 10 MMOL/L
POC BE: 11 MMOL/L
POC BE: 11 MMOL/L
POC BE: 12 MMOL/L
POC BE: 16 MMOL/L
POC SATURATED O2: 92 % (ref 95–100)
POC SATURATED O2: 94 % (ref 95–100)
POC SATURATED O2: 94 % (ref 95–100)
POC SATURATED O2: 95 % (ref 95–100)
POC SATURATED O2: 96 % (ref 95–100)
POC SATURATED O2: 96 % (ref 95–100)
POC TCO2: 34 MMOL/L (ref 23–27)
POC TCO2: 34 MMOL/L (ref 23–27)
POC TCO2: 35 MMOL/L (ref 23–27)
POC TCO2: 35 MMOL/L (ref 23–27)
POC TCO2: 37 MMOL/L (ref 23–27)
POC TCO2: 39 MMOL/L (ref 23–27)
POCT GLUCOSE: 131 MG/DL (ref 70–110)
POCT GLUCOSE: 148 MG/DL (ref 70–110)
POCT GLUCOSE: 171 MG/DL (ref 70–110)
POCT GLUCOSE: 188 MG/DL (ref 70–110)
POCT GLUCOSE: 190 MG/DL (ref 70–110)
POCT GLUCOSE: 191 MG/DL (ref 70–110)
POCT GLUCOSE: 94 MG/DL (ref 70–110)
POTASSIUM SERPL-SCNC: 4.3 MMOL/L
POTASSIUM SERPL-SCNC: 4.6 MMOL/L
POTASSIUM SERPL-SCNC: 4.8 MMOL/L
POTASSIUM SERPL-SCNC: 5.4 MMOL/L
PROT SERPL-MCNC: 6.4 G/DL
PROT SERPL-MCNC: 6.9 G/DL
PROTHROMBIN TIME: 12.3 SEC
RBC # BLD AUTO: 3.77 M/UL
SAMPLE: ABNORMAL
SITE: ABNORMAL
SODIUM SERPL-SCNC: 145 MMOL/L
SODIUM SERPL-SCNC: 149 MMOL/L
SODIUM SERPL-SCNC: 151 MMOL/L
SODIUM SERPL-SCNC: 153 MMOL/L
SP02: 92
SP02: 93
SP02: 93
SP02: 94
VT: 370
VT: 370
VT: 375
WBC # BLD AUTO: 20.79 K/UL

## 2018-04-17 PROCEDURE — 25000003 PHARM REV CODE 250: Performed by: INTERNAL MEDICINE

## 2018-04-17 PROCEDURE — 99233 SBSQ HOSP IP/OBS HIGH 50: CPT | Mod: GC,,, | Performed by: INTERNAL MEDICINE

## 2018-04-17 PROCEDURE — 83735 ASSAY OF MAGNESIUM: CPT

## 2018-04-17 PROCEDURE — 25000003 PHARM REV CODE 250: Performed by: STUDENT IN AN ORGANIZED HEALTH CARE EDUCATION/TRAINING PROGRAM

## 2018-04-17 PROCEDURE — 99291 CRITICAL CARE FIRST HOUR: CPT | Mod: ,,, | Performed by: NURSE PRACTITIONER

## 2018-04-17 PROCEDURE — 25000003 PHARM REV CODE 250: Performed by: NURSE PRACTITIONER

## 2018-04-17 PROCEDURE — 94003 VENT MGMT INPAT SUBQ DAY: CPT

## 2018-04-17 PROCEDURE — 63600175 PHARM REV CODE 636 W HCPCS: Performed by: NURSE PRACTITIONER

## 2018-04-17 PROCEDURE — 99900026 HC AIRWAY MAINTENANCE (STAT)

## 2018-04-17 PROCEDURE — 80048 BASIC METABOLIC PNL TOTAL CA: CPT | Mod: 91

## 2018-04-17 PROCEDURE — 25000242 PHARM REV CODE 250 ALT 637 W/ HCPCS: Performed by: INTERNAL MEDICINE

## 2018-04-17 PROCEDURE — 63600175 PHARM REV CODE 636 W HCPCS: Performed by: HOSPITALIST

## 2018-04-17 PROCEDURE — 99900035 HC TECH TIME PER 15 MIN (STAT)

## 2018-04-17 PROCEDURE — 25000003 PHARM REV CODE 250

## 2018-04-17 PROCEDURE — 20000000 HC ICU ROOM

## 2018-04-17 PROCEDURE — 85610 PROTHROMBIN TIME: CPT

## 2018-04-17 PROCEDURE — 94640 AIRWAY INHALATION TREATMENT: CPT

## 2018-04-17 PROCEDURE — 99232 SBSQ HOSP IP/OBS MODERATE 35: CPT | Mod: ,,, | Performed by: INTERNAL MEDICINE

## 2018-04-17 PROCEDURE — 37799 UNLISTED PX VASCULAR SURGERY: CPT

## 2018-04-17 PROCEDURE — 84100 ASSAY OF PHOSPHORUS: CPT

## 2018-04-17 PROCEDURE — 82803 BLOOD GASES ANY COMBINATION: CPT

## 2018-04-17 PROCEDURE — 25000003 PHARM REV CODE 250: Performed by: EMERGENCY MEDICINE

## 2018-04-17 PROCEDURE — 94761 N-INVAS EAR/PLS OXIMETRY MLT: CPT

## 2018-04-17 PROCEDURE — 80053 COMPREHEN METABOLIC PANEL: CPT

## 2018-04-17 PROCEDURE — 27000221 HC OXYGEN, UP TO 24 HOURS

## 2018-04-17 PROCEDURE — 80076 HEPATIC FUNCTION PANEL: CPT

## 2018-04-17 PROCEDURE — 85025 COMPLETE CBC W/AUTO DIFF WBC: CPT

## 2018-04-17 RX ORDER — METOPROLOL TARTRATE 1 MG/ML
2.5 INJECTION, SOLUTION INTRAVENOUS ONCE
Status: COMPLETED | OUTPATIENT
Start: 2018-04-17 | End: 2018-04-17

## 2018-04-17 RX ORDER — METOPROLOL TARTRATE 1 MG/ML
INJECTION, SOLUTION INTRAVENOUS
Status: COMPLETED
Start: 2018-04-17 | End: 2018-04-17

## 2018-04-17 RX ORDER — POSACONAZOLE 40 MG/ML
400 SUSPENSION ORAL 2 TIMES DAILY WITH MEALS
Status: DISCONTINUED | OUTPATIENT
Start: 2018-04-17 | End: 2018-04-23

## 2018-04-17 RX ORDER — LORAZEPAM 2 MG/ML
INJECTION INTRAMUSCULAR
Status: DISCONTINUED
Start: 2018-04-17 | End: 2018-04-17 | Stop reason: WASHOUT

## 2018-04-17 RX ORDER — METOPROLOL TARTRATE 1 MG/ML
5 INJECTION, SOLUTION INTRAVENOUS ONCE
Status: COMPLETED | OUTPATIENT
Start: 2018-04-17 | End: 2018-04-17

## 2018-04-17 RX ORDER — FUROSEMIDE 10 MG/ML
40 INJECTION INTRAMUSCULAR; INTRAVENOUS ONCE
Status: COMPLETED | OUTPATIENT
Start: 2018-04-17 | End: 2018-04-17

## 2018-04-17 RX ORDER — DEXMEDETOMIDINE HYDROCHLORIDE 4 UG/ML
0.2 INJECTION, SOLUTION INTRAVENOUS CONTINUOUS
Status: DISCONTINUED | OUTPATIENT
Start: 2018-04-17 | End: 2018-04-19

## 2018-04-17 RX ADMIN — INSULIN ASPART 3 UNITS: 100 INJECTION, SOLUTION INTRAVENOUS; SUBCUTANEOUS at 08:04

## 2018-04-17 RX ADMIN — IPRATROPIUM BROMIDE AND ALBUTEROL SULFATE 3 ML: .5; 3 SOLUTION RESPIRATORY (INHALATION) at 07:04

## 2018-04-17 RX ADMIN — SODIUM CHLORIDE, SODIUM LACTATE, POTASSIUM CHLORIDE, AND CALCIUM CHLORIDE 500 ML: 600; 310; 30; 20 INJECTION, SOLUTION INTRAVENOUS at 06:04

## 2018-04-17 RX ADMIN — Medication 12.5 MG: at 09:04

## 2018-04-17 RX ADMIN — HYDROXYCHLOROQUINE SULFATE 400 MG: 200 TABLET, FILM COATED ORAL at 09:04

## 2018-04-17 RX ADMIN — IPRATROPIUM BROMIDE AND ALBUTEROL SULFATE 3 ML: .5; 3 SOLUTION RESPIRATORY (INHALATION) at 11:04

## 2018-04-17 RX ADMIN — STANDARDIZED SENNA CONCENTRATE AND DOCUSATE SODIUM 1 TABLET: 8.6; 5 TABLET, FILM COATED ORAL at 09:04

## 2018-04-17 RX ADMIN — METHYLPREDNISOLONE SODIUM SUCCINATE 80 MG: 125 INJECTION, POWDER, FOR SOLUTION INTRAMUSCULAR; INTRAVENOUS at 09:04

## 2018-04-17 RX ADMIN — INSULIN ASPART 2 UNITS: 100 INJECTION, SOLUTION INTRAVENOUS; SUBCUTANEOUS at 08:04

## 2018-04-17 RX ADMIN — PIPERACILLIN AND TAZOBACTAM 4.5 G: 4; .5 INJECTION, POWDER, LYOPHILIZED, FOR SOLUTION INTRAVENOUS; PARENTERAL at 03:04

## 2018-04-17 RX ADMIN — Medication 50 MCG/HR: at 11:04

## 2018-04-17 RX ADMIN — IPRATROPIUM BROMIDE AND ALBUTEROL SULFATE 3 ML: .5; 3 SOLUTION RESPIRATORY (INHALATION) at 03:04

## 2018-04-17 RX ADMIN — MINERAL OIL AND WHITE PETROLATUM: 150; 830 OINTMENT OPHTHALMIC at 06:04

## 2018-04-17 RX ADMIN — INSULIN ASPART 3 UNITS: 100 INJECTION, SOLUTION INTRAVENOUS; SUBCUTANEOUS at 04:04

## 2018-04-17 RX ADMIN — CHLORHEXIDINE GLUCONATE 15 ML: 1.2 RINSE ORAL at 09:04

## 2018-04-17 RX ADMIN — Medication 12.5 MG: at 06:04

## 2018-04-17 RX ADMIN — AMIODARONE HYDROCHLORIDE 1 MG/MIN: 1.8 INJECTION, SOLUTION INTRAVENOUS at 08:04

## 2018-04-17 RX ADMIN — PIPERACILLIN AND TAZOBACTAM 4.5 G: 4; .5 INJECTION, POWDER, LYOPHILIZED, FOR SOLUTION INTRAVENOUS; PARENTERAL at 06:04

## 2018-04-17 RX ADMIN — METOPROLOL TARTRATE 5 MG: 5 INJECTION, SOLUTION INTRAVENOUS at 06:04

## 2018-04-17 RX ADMIN — CLOPIDOGREL 75 MG: 75 TABLET, FILM COATED ORAL at 08:04

## 2018-04-17 RX ADMIN — DEXMEDETOMIDINE HYDROCHLORIDE 0.2 MCG/KG/HR: 100 INJECTION, SOLUTION, CONCENTRATE INTRAVENOUS at 09:04

## 2018-04-17 RX ADMIN — DEXMEDETOMIDINE HYDROCHLORIDE 1 MCG/KG/HR: 100 INJECTION, SOLUTION, CONCENTRATE INTRAVENOUS at 03:04

## 2018-04-17 RX ADMIN — HEPARIN SODIUM 5000 UNITS: 5000 INJECTION, SOLUTION INTRAVENOUS; SUBCUTANEOUS at 03:04

## 2018-04-17 RX ADMIN — FUROSEMIDE 40 MG: 10 INJECTION, SOLUTION INTRAMUSCULAR; INTRAVENOUS at 03:04

## 2018-04-17 RX ADMIN — METOPROLOL TARTRATE 2.5 MG: 5 INJECTION, SOLUTION INTRAVENOUS at 06:04

## 2018-04-17 RX ADMIN — AMIODARONE HYDROCHLORIDE 150 MG: 1.5 INJECTION, SOLUTION INTRAVENOUS at 07:04

## 2018-04-17 RX ADMIN — PIPERACILLIN AND TAZOBACTAM 4.5 G: 4; .5 INJECTION, POWDER, LYOPHILIZED, FOR SOLUTION INTRAVENOUS; PARENTERAL at 11:04

## 2018-04-17 RX ADMIN — MINERAL OIL AND WHITE PETROLATUM: 150; 830 OINTMENT OPHTHALMIC at 09:04

## 2018-04-17 RX ADMIN — INSULIN ASPART 3 UNITS: 100 INJECTION, SOLUTION INTRAVENOUS; SUBCUTANEOUS at 12:04

## 2018-04-17 RX ADMIN — METHYLPREDNISOLONE SODIUM SUCCINATE 80 MG: 125 INJECTION, POWDER, FOR SOLUTION INTRAMUSCULAR; INTRAVENOUS at 08:04

## 2018-04-17 RX ADMIN — METOPROLOL TARTRATE 2.5 MG: 1 INJECTION, SOLUTION INTRAVENOUS at 06:04

## 2018-04-17 RX ADMIN — INSULIN ASPART 2 UNITS: 100 INJECTION, SOLUTION INTRAVENOUS; SUBCUTANEOUS at 04:04

## 2018-04-17 RX ADMIN — DEXMEDETOMIDINE HYDROCHLORIDE 1.4 MCG/KG/HR: 100 INJECTION, SOLUTION, CONCENTRATE INTRAVENOUS at 11:04

## 2018-04-17 RX ADMIN — ATOVAQUONE 1500 MG: 750 SUSPENSION ORAL at 09:04

## 2018-04-17 RX ADMIN — DEXMEDETOMIDINE HYDROCHLORIDE 1.4 MCG/KG/HR: 100 INJECTION, SOLUTION, CONCENTRATE INTRAVENOUS at 08:04

## 2018-04-17 RX ADMIN — PANTOPRAZOLE SODIUM 40 MG: 40 GRANULE, DELAYED RELEASE ORAL at 09:04

## 2018-04-17 RX ADMIN — METOPROLOL TARTRATE 2.5 MG: 5 INJECTION, SOLUTION INTRAVENOUS at 07:04

## 2018-04-17 RX ADMIN — STANDARDIZED SENNA CONCENTRATE AND DOCUSATE SODIUM 1 TABLET: 8.6; 5 TABLET, FILM COATED ORAL at 08:04

## 2018-04-17 RX ADMIN — HEPARIN SODIUM 5000 UNITS: 5000 INJECTION, SOLUTION INTRAVENOUS; SUBCUTANEOUS at 11:04

## 2018-04-17 RX ADMIN — MINERAL OIL AND WHITE PETROLATUM: 150; 830 OINTMENT OPHTHALMIC at 03:04

## 2018-04-17 RX ADMIN — HEPARIN SODIUM 5000 UNITS: 5000 INJECTION, SOLUTION INTRAVENOUS; SUBCUTANEOUS at 06:04

## 2018-04-17 RX ADMIN — FENTANYL CITRATE 50 MCG: 50 INJECTION, SOLUTION INTRAMUSCULAR; INTRAVENOUS at 09:04

## 2018-04-17 RX ADMIN — POSACONAZOLE 400 MG: 40 SUSPENSION ORAL at 08:04

## 2018-04-17 RX ADMIN — METOPROLOL TARTRATE 5 MG: 1 INJECTION, SOLUTION INTRAVENOUS at 06:04

## 2018-04-17 NOTE — ASSESSMENT & PLAN NOTE
--suspect secondary to sedatives for ventilator dyssynchrony and critical illness.    --off sedation since 12:30 4/16

## 2018-04-17 NOTE — SUBJECTIVE & OBJECTIVE
Interval History: Weaning off sedation today, remains intubated and on 50% FiO2 with goal to wean. Had low grade temp of 100.4F yesterday. No other clinical change. Cytology shows acute inflammation with negative GMS. Cultures remain negative.    Review of Systems   Unable to perform ROS: Intubated     Objective:     Vital Signs (Most Recent):  Temp: 99.2 °F (37.3 °C) (04/17/18 1500)  Pulse: (!) 127 (04/17/18 1710)  Resp: 14 (04/17/18 1710)  BP: (!) 154/69 (04/17/18 1700)  SpO2: (!) 92 % (04/17/18 1710) Vital Signs (24h Range):  Temp:  [98.6 °F (37 °C)-100.4 °F (38 °C)] 99.2 °F (37.3 °C)  Pulse:  [] 127  Resp:  [7-49] 14  SpO2:  [86 %-99 %] 92 %  BP: (115-214)/(56-87) 154/69  Arterial Line BP: (107-151)/(45-67) 115/51     Weight: 71.5 kg (157 lb 10.1 oz)  Body mass index is 26.23 kg/m².    Estimated Creatinine Clearance: 29.7 mL/min (A) (based on SCr of 1.5 mg/dL (H)).    Physical Exam   Constitutional: She appears well-developed. No distress.   Intubated/sedated.   HENT:   Head: Atraumatic.   Mouth/Throat: Oropharynx is clear and moist. No oropharyngeal exudate.   Eyes: Conjunctivae and EOM are normal. Pupils are equal, round, and reactive to light. No scleral icterus.   Neck: Neck supple.   Cardiovascular: Normal rate and regular rhythm.  Exam reveals no friction rub.    No murmur heard.  Pulmonary/Chest: No respiratory distress. She has no wheezes. She has rales. She exhibits no tenderness.   On vent, 50% FiO2.   Abdominal: Soft. Bowel sounds are normal. She exhibits no distension. There is no tenderness. There is no rebound and no guarding.   Musculoskeletal: Normal range of motion. She exhibits no edema.   Lymphadenopathy:     She has no cervical adenopathy.   Neurological:   Intubated/sedated.   Skin: No rash noted. No erythema.       Significant Labs:   CBC:     Recent Labs  Lab 04/16/18  0357 04/17/18  0358   WBC 19.56* 20.79*   HGB 7.7* 9.0*   HCT 24.4* 28.6*    425*     CMP:     Recent  Labs  Lab 04/16/18  0357  04/16/18  2334 04/17/18  0358 04/17/18  0800 04/17/18  1518     < > 145  --  149* 151*   K 5.2*  < > 4.8  --  4.6 5.4*     < > 104  --  107 110   CO2 26  < > 31*  --  30* 32*   *  < > 157*  --  186* 152*   BUN 80*  < > 100*  --  90* 80*   CREATININE 2.1*  < > 2.1*  --  1.7* 1.5*   CALCIUM 8.0*  < > 8.6*  --  9.0 8.4*   PROT 5.5*  --   --  6.4  --   --    ALBUMIN 1.5*  --   --  1.8*  --   --    BILITOT 0.2  --   --  0.4  --   --    ALKPHOS 74  --   --  81  --   --    AST 25  --   --  15  --   --    ALT 15  --   --  17  --   --    ANIONGAP 12  < > 10  --  12 9   EGFRNONAA 21.8*  < > 21.8*  --  28.1* 32.7*   < > = values in this interval not displayed.    Significant Imaging: I have reviewed all pertinent imaging results/findings within the past 24 hours.     CT chest:  1. Progression of bilateral patchy and confluent pulmonary consolidation compatible with pneumonia less likely edema or noninfectious inflammation.  There is air noted throughout the esophagus.  2. Stable markedly enlarged right thyroid lobe with large hypodense nodule measuring 3.2 x 2.4 x 4.7 cm.  3. Right renal cyst.  4. Enlarged lobular uterus with calcifications compatible with fibroids.  5. Additional findings as above.     Microbiology:  4/5 blood cx negative  4/5 urine cx negative  4/10 C.diff negative  4/11 blood cx negative  4/11 BAL cx NGTD, cytology with acute inflammation; GMS negative  4/11 aspergillus antigen negative  4/11 fungitell negative  4/11 urine histo negative  4/11 urine blasto negative  4/11 serum crypto negative  4/11 RVP pending  4/11 urine legionella negative

## 2018-04-17 NOTE — PT/OT/SLP PROGRESS
Physical Therapy      Patient Name:  Selma Lux MD   MRN:  [id number]    Patient not seen today. Failed MOVE screen 4/17. Will follow up when appropriate.    Xiao Preciado, PT, DPT  4/17/2018  [phone]

## 2018-04-17 NOTE — ASSESSMENT & PLAN NOTE
Most likely secondary to KERMIT and Bactrim use in addition to high dose steroids.  - would recommend to switch Bactrim to other agent for PPX   -  K 4.6 stable no need to treat

## 2018-04-17 NOTE — PROGRESS NOTES
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 She has a complicated hospital stay as she was admitted to  with bilateral basilar infiltrates and was transferred to MICU after needeing higher level of care secondary to hypoxic respiratory failure. She had been treated with very broad spectrum abx secondary to her immunocompromise state with ID following vancomycin, zosyn, bactrim, and posaconazole. She became hemodynamically unstable and required vasopressors, Norepinephrine initiated for BP support suspected secondary to sedation by primary team. Bronchoscopy performed at bedside on 4/11. Nimbex has been discontinued on 4/12. She continues to require significant oxygen support. On 4/8 she had Chest CTA done. Nephrology consulted for KERMIT and hyperkalemia. She has a sCr baseline of 0.8-0.9 mg/dL. Her sCr started to rise on 4/11 night and has steadily been rising since. She had also been on trimethoprim and had a 75 ml exposure to iodine contrast in setting of hypotension requiring pressors. She was weaned of Norepi early this am but her SBP dropped to 86 but her MAP remained at 65. She was restarted back on Norepi this am. She has been treated with Vancomcyn (Through levels 24  and ~ 25) and Pip/Tazo, Plus therapeutic dose of bactrim, now on prophylaxis dose. She has been diuresis through her Hospital stay with Furosemide different doses. She had elevation of her CBC about 3-4 day ago with no evidence of a rash. This am She was given 80 mg of Furosemide IV with increase in her UO. Her sCr peaked at 2.0 and her latest sCr was 1.9 mg/dL.    Interval History: NAEON, Very good UO 4810 ml/24hrs. Hemodynamically stable off pressors. Vent support improving. Improving sCr 1.7 mg/dL with improved K 4.6 mmol/L. Volume status improving. FiO2 improved to 50% and PEEP 8.    Review of patient's allergies indicates:  No Known Allergies  Current Facility-Administered Medications   Medication Frequency    acetaminophen tablet 650 mg Q8H PRN    albuterol-ipratropium 2.5mg-0.5mg/3mL nebulizer solution 3 mL Q4H PRN    albuterol-ipratropium 2.5mg-0.5mg/3mL nebulizer solution 3 mL Q4H    atovaquone suspension 1,500 mg Daily    chlorhexidine 0.12 % solution 15 mL BID    clopidogrel tablet 75 mg QHS    dexmedetomidine (PRECEDEX) 400mcg/100mL 0.9% NaCL infusion Continuous    dextrose 50% injection 12.5 g PRN    fentaNYL 2500 mcg in 0.9% sodium chloride 250 mL infusion premix (titrating) Continuous    fentaNYL injection 50 mcg Q2H PRN    glucagon (human recombinant) injection 1 mg PRN    heparin (porcine) injection 5,000 Units Q8H    hydroxychloroquine tablet 400 mg Daily    insulin aspart U-100 pen 1-10 Units Q4H PRN    insulin aspart U-100 pen 3 Units Q24H    insulin aspart U-100 pen 3 Units Q24H    insulin aspart U-100 pen 3 Units Q24H    insulin aspart U-100 pen 3 Units Q24H    insulin aspart U-100 pen 3 Units Q24H    insulin aspart U-100 pen 3 Units Q24H    magnesium sulfate 2g in water 50mL IVPB (premix) PRN    magnesium sulfate 2g in water 50mL IVPB (premix) PRN    methylPREDNISolone sodium succinate injection 80 mg Q12H    pantoprazole suspension 40 mg Daily     piperacillin-tazobactam 4.5 g in sodium chloride 0.9% 100 mL IVPB (ready to mix system) Q8H    polyethylene glycol packet 17 g Daily    posaconazole 300 mg in dextrose 5 % 150 mL infusion Daily    potassium, sodium phosphates 280-160-250 mg packet 2 packet PRN    potassium, sodium phosphates 280-160-250 mg packet 2 packet PRN    potassium, sodium phosphates 280-160-250 mg packet 2 packet PRN    propofol (DIPRIVAN) 10 mg/mL infusion Continuous    senna-docusate 8.6-50 mg per tablet 1 tablet BID    sodium chloride 0.9% flush 5 mL PRN    white petrolatum-mineral oil (LUBIFRESH P.M.) ophthalmic ointment Q8H       Objective:     Vital Signs (Most Recent):  Temp: 98.6 °F (37 °C) (04/17/18 1100)  Pulse: 86 (04/17/18 1300)  Resp: 19 (04/17/18 1300)  BP: 131/63 (04/17/18 1300)  SpO2: 97 % (04/17/18 1300)  O2 Device (Oxygen Therapy): ventilator (04/17/18 1300) Vital Signs (24h Range):  Temp:  [97.2 °F (36.2 °C)-100.4 °F (38 °C)] 98.6 °F (37 °C)  Pulse:  [] 86  Resp:  [7-56] 19  SpO2:  [86 %-99 %] 97 %  BP: (112-214)/(55-87) 131/63  Arterial Line BP: (106-151)/(41-67) 115/51     Weight: 71.5 kg (157 lb 10.1 oz) (04/16/18 1400)  Body mass index is 26.23 kg/m².  Body surface area is 1.81 meters squared.    I/O last 3 completed shifts:  In: 2606.9 [I.V.:136.9; NG/GT:1870; IV Piggyback:600]  Out: 6570 [Urine:6060; Drains:10; Stool:500]    Physical Exam   Constitutional: She appears well-developed and well-nourished. She appears ill. No distress. She is sedated and intubated.   HENT:   Head: Normocephalic and atraumatic.   Eyes: Conjunctivae are normal. Pupils are equal, round, and reactive to light.   Neck: Trachea normal. Neck supple. No JVD present.   Cardiovascular: Normal rate, regular rhythm, S1 normal, S2 normal, intact distal pulses and normal pulses.  Exam reveals no gallop and no friction rub.    No murmur heard.  Pulmonary/Chest: She is intubated. She has decreased breath sounds in the right lower field  and the left lower field. She has no wheezes. She has rales in the right lower field and the left lower field.   Abdominal: Soft. Bowel sounds are normal. She exhibits no distension. There is no tenderness.   Musculoskeletal: Normal range of motion. She exhibits edema (trace pitting edema LE and sacral area).   Neurological:   Sedated on mech ventilation   Skin: Skin is warm and dry. Capillary refill takes less than 2 seconds.   Psychiatric:   Sedated     Vitals reviewed.      Significant Labs:  ABGs:     Recent Labs  Lab 04/17/18  1129   PH 7.512*   PCO2 41.8   HCO3 33.5*   POCSATURATED 92*   BE 11     BMP:     Recent Labs  Lab 04/17/18  0358 04/17/18  0800   GLU  --  186*   CL  --  107   CO2  --  30*   BUN  --  90*   CREATININE  --  1.7*   CALCIUM  --  9.0   MG 2.3  --      Cardiac Markers: No results for input(s): CKMB, TROPONINT, MYOGLOBIN in the last 168 hours.  CBC:     Recent Labs  Lab 04/17/18  0358   WBC 20.79*   RBC 3.77*   HGB 9.0*   HCT 28.6*   *   MCV 76*   MCH 23.9*   MCHC 31.5*       Recent Labs  Lab 04/15/18  1407   COLORU Straw   SPECGRAV 1.010   PHUR 5.0   PROTEINUA Negative   NITRITE Negative   LEUKOCYTESUR Negative   UROBILINOGEN Negative   HYALINECASTS 16*     All labs within the past 24 hours have been reviewed.     Significant Imaging:  Labs: Reviewed  US: Reviewed    Assessment/Plan:     KERMIT (acute kidney injury)    KERMIT non oliguric stage 2 by KDIGO, suspect multifactorial tubular injury suspecting ATN from hypoperfusion, bactrim and Vancomycin in conjunction with Pip/Tazo in setting of contrast exposure. 72 Hrs after exposure her sCr rised by 0.3 mg/dL. Suspect all this factors contributed to her KERMIT    · Kyle 83,   · UPCR undetectable, wrights stain negative  · US renal Normal size kidneys withR kidney simple cyst no hydronephrosis seen  · Adequate rsponse to diuretics. Had excellent UO overnight but Ph 7.5 and bicarb 30 suggest contraction effect recommend to hold Diuretics as of  now.  · Please keep SBP > 90 and MAP > 65  · Consider close monitor of Vanc Through levels if there is another option for Pip/Tazo with Vanc combination would probably be less toxic to kidneys  · Bactrim stopped  · Trend electrolytes   · continue to monitor strict I/O's, daily weights, renally dose medications, and avoid nephrotoxic agents                Hyperkalemia    Most likely secondary to KERMIT and Bactrim use in addition to high dose steroids.  - would recommend to switch Bactrim to other agent for PPX   -  K 4.6 stable no need to treat              Thank you for your consult. I will sign off. Please contact us if you have any additional questions.    Melecio Macias MD  Nephrology  [facility]    I have reviewed and concur with the fellow's history, physical, assessment, and plan. I have personally interviewed and examined the patient at bedside

## 2018-04-17 NOTE — SUBJECTIVE & OBJECTIVE
Interval History/Significant Events: Off sedation (propofol and fentanyl) since 12:30 yesterday. Tmax 100.4 overnight    Review of Systems   Unable to perform ROS: Intubated     Objective:     Vital Signs (Most Recent):  Temp: 99 °F (37.2 °C) (04/17/18 0705)  Pulse: 96 (04/17/18 0719)  Resp: (!) 36 (04/17/18 0719)  BP: (!) 157/70 (04/17/18 0719)  SpO2: (!) 93 % (04/17/18 0719) Vital Signs (24h Range):  Temp:  [97.2 °F (36.2 °C)-100.4 °F (38 °C)] 99 °F (37.2 °C)  Pulse:  [] 96  Resp:  [7-56] 36  SpO2:  [87 %-95 %] 93 %  BP: (100-157)/(51-72) 157/70  Arterial Line BP: (102-151)/(38-64) 142/59   Weight: 71.5 kg (157 lb 10.1 oz)  Body mass index is 26.23 kg/m².      Intake/Output Summary (Last 24 hours) at 04/17/18 0805  Last data filed at 04/17/18 0700   Gross per 24 hour   Intake          1818.27 ml   Output             4785 ml   Net         -2966.73 ml       Physical Exam   Constitutional: She appears well-developed. She appears lethargic. She does not have a sickly appearance. She is sedated and intubated.   HENT:   Head: Normocephalic and atraumatic.   Eyes: Conjunctivae and EOM are normal. Pupils are equal, round, and reactive to light. Right eye exhibits no exudate. Left eye exhibits no exudate. Right conjunctiva has no hemorrhage. Left conjunctiva has no hemorrhage. Right eye exhibits no nystagmus. Left eye exhibits no nystagmus.   Neck: Trachea normal. No neck rigidity. No tracheal deviation present.       Cardiovascular: Regular rhythm and normal heart sounds.   Occasional extrasystoles are present. PMI is not displaced.  Exam reveals no gallop and no friction rub.    No murmur heard.  Pulses:       Radial pulses are 2+ on the right side, and 2+ on the left side.        Dorsalis pedis pulses are 2+ on the right side, and 2+ on the left side.        Posterior tibial pulses are 2+ on the right side, and 2+ on the left side.   Pulmonary/Chest: Effort normal and breath sounds normal. Tachypnea noted. She is  intubated. She has no rhonchi.   Breath sounds coarse bilaterally   Abdominal: Soft. Normal appearance and bowel sounds are normal. There is no tenderness.   Genitourinary:   Genitourinary Comments: Hill draining clear, yellow urine   Musculoskeletal: Normal range of motion.   Neurological: She has normal strength. She appears lethargic. GCS eye subscore is 3. GCS verbal subscore is 1. GCS motor subscore is 5.   Skin: Skin is warm and dry. Capillary refill takes 2 to 3 seconds. No cyanosis. Nails show no clubbing.   Nursing note and vitals reviewed.      Vents:  Vent Mode: A/C (04/17/18 0719)  Set Rate: 26 bmp (04/17/18 0719)  Vt Set: 370 mL (04/17/18 0719)  Pressure Support: 0 cmH20 (04/17/18 0719)  PEEP/CPAP: 8 cmH20 (04/17/18 0719)  Oxygen Concentration (%): 40 (04/17/18 0719)  Peak Airway Pressure: 41 cmH2O (04/17/18 0719)  Plateau Pressure: 25 cmH20 (04/17/18 0719)  Total Ve: 15.1 mL (04/17/18 0719)  F/VT Ratio<105 (RSBI): (!) 86.33 (04/17/18 0719)  Lines/Drains/Airways     Central Venous Catheter Line                 Percutaneous Central Line Insertion/Assessment - triple lumen  04/11/18 1513 right subclavian 5 days          Drain                 NG/OG Tube 04/11/18 1340 Left mouth 5 days         Urethral Catheter 04/11/18 1430 Double-lumen 16 Fr. 5 days         Rectal Tube 04/16/18 0600 rectal tube w/ balloon (indicate number of mLs) 1 day          Airway                 Airway - Non-Surgical 04/11/18 1337 Endotracheal Tube 5 days          Arterial Line                 Arterial Line 04/11/18 1540 Right Radial 5 days              Significant Labs:    CBC/Anemia Profile:    Recent Labs  Lab 04/16/18  0357 04/17/18  0358   WBC 19.56* 20.79*   HGB 7.7* 9.0*   HCT 24.4* 28.6*    425*   MCV 76* 76*   RDW 16.5* 17.1*        Chemistries:    Recent Labs  Lab 04/16/18  0357 04/16/18  1529 04/16/18  2334 04/17/18  0358    144 145  --    K 5.2* 5.3* 4.8  --     105 104  --    CO2 26 32* 31*  --    BUN  80* 95* 100*  --    CREATININE 2.1* 2.2* 2.1*  --    CALCIUM 8.0* 8.5* 8.6*  --    ALBUMIN 1.5*  --   --  1.8*   PROT 5.5*  --   --  6.4   BILITOT 0.2  --   --  0.4   ALKPHOS 74  --   --  81   ALT 15  --   --  17   AST 25  --   --  15   MG 2.5  --   --  2.3   PHOS 5.3*  --   --  3.7       All pertinent labs within the past 24 hours have been reviewed.    Significant Imaging:  I have reviewed all pertinent imaging results/findings within the past 24 hours.

## 2018-04-17 NOTE — PROGRESS NOTES
[facility]  Infectious Disease  Progress Note    Patient Name: Selma Alonzo Lux MD  MRN: [id number]  Admission Date: 4/5/2018  Length of Stay: 12 days  Attending Physician: Raffaele Almonte*  Primary Care Provider: Bhargav Hirsch MD    Isolation Status: No active isolations  Assessment/Plan:      * Acute hypoxemic respiratory failure    83yo woman w/a history of prior CVA, RA (dx 2012; RF seropositive, erosive; on HCQ/prednisone 5mg and recently started humira first dose 3/22/2018), IBS, and Hasimotos thyroiditis (goiter) who was admitted on 4/5/2018 with 2 days of dry cough and progressive SOB due to multifocal PNA (with peripheral and basilar lesions noted on CT chest) that has progressively worsened despite CAP coverage x5 days, requiring intubation on 4/12 for hypoxic RF. She remains critically ill with slow improvement on expanded coverage for all possibilities on differential of hypoxic RF (most notable for MDRO bacterial HCAP/HAP, IFI including Aspergillus and endemic fungi, and  vs hypersensitivity pneumonitis). Course c/b KERMIT (now resolved after empiric bactrim stopped with negative GMS). Etiology remains unknown despite her slow improvement.    - would continue pip-tazo at this time for possible HAP/aspiration (given acute inflammation noted on cytology from bronch and no growth from cultures; will offer empiric 7 day course)   - would continue empiric posaconazole for coverage of the above mold species and await pending fungal cultures; all fungal markers are negative  - bactrim prophylaxis transitioned to atovaquone given recent KERMIT  - may continue steroids for now at discretion of CC team as we cannot definitively exclude /HP  - await pending BAL cultures and will tailor therapy with you as she improves              Anticipated Disposition: pending improvement    Thank you for your consult. I will follow-up with patient. Please contact us if you have any  additional questions.     Carol Tan MD  Transplant ID Attending  [phone]    Carol Tan MD  Infectious Disease  [facility]    Subjective:     Principal Problem:Acute hypoxemic respiratory failure    HPI: No notes on file  Interval History: Weaning off sedation today, remains intubated and on 50% FiO2 with goal to wean. Had low grade temp of 100.4F yesterday. No other clinical change. Cytology shows acute inflammation with negative GMS. Cultures remain negative.    Review of Systems   Unable to perform ROS: Intubated     Objective:     Vital Signs (Most Recent):  Temp: 99.2 °F (37.3 °C) (04/17/18 1500)  Pulse: (!) 127 (04/17/18 1710)  Resp: 14 (04/17/18 1710)  BP: (!) 154/69 (04/17/18 1700)  SpO2: (!) 92 % (04/17/18 1710) Vital Signs (24h Range):  Temp:  [98.6 °F (37 °C)-100.4 °F (38 °C)] 99.2 °F (37.3 °C)  Pulse:  [] 127  Resp:  [7-49] 14  SpO2:  [86 %-99 %] 92 %  BP: (115-214)/(56-87) 154/69  Arterial Line BP: (107-151)/(45-67) 115/51     Weight: 71.5 kg (157 lb 10.1 oz)  Body mass index is 26.23 kg/m².    Estimated Creatinine Clearance: 29.7 mL/min (A) (based on SCr of 1.5 mg/dL (H)).    Physical Exam   Constitutional: She appears well-developed. No distress.   Intubated/sedated.   HENT:   Head: Atraumatic.   Mouth/Throat: Oropharynx is clear and moist. No oropharyngeal exudate.   Eyes: Conjunctivae and EOM are normal. Pupils are equal, round, and reactive to light. No scleral icterus.   Neck: Neck supple.   Cardiovascular: Normal rate and regular rhythm.  Exam reveals no friction rub.    No murmur heard.  Pulmonary/Chest: No respiratory distress. She has no wheezes. She has rales. She exhibits no tenderness.   On vent, 50% FiO2.   Abdominal: Soft. Bowel sounds are normal. She exhibits no distension. There is no tenderness. There is no rebound and no guarding.   Musculoskeletal: Normal range of motion. She exhibits no edema.   Lymphadenopathy:     She has no cervical  adenopathy.   Neurological:   Intubated/sedated.   Skin: No rash noted. No erythema.       Significant Labs:   CBC:     Recent Labs  Lab 04/16/18  0357 04/17/18  0358   WBC 19.56* 20.79*   HGB 7.7* 9.0*   HCT 24.4* 28.6*    425*     CMP:     Recent Labs  Lab 04/16/18  0357  04/16/18  2334 04/17/18  0358 04/17/18  0800 04/17/18  1518     < > 145  --  149* 151*   K 5.2*  < > 4.8  --  4.6 5.4*     < > 104  --  107 110   CO2 26  < > 31*  --  30* 32*   *  < > 157*  --  186* 152*   BUN 80*  < > 100*  --  90* 80*   CREATININE 2.1*  < > 2.1*  --  1.7* 1.5*   CALCIUM 8.0*  < > 8.6*  --  9.0 8.4*   PROT 5.5*  --   --  6.4  --   --    ALBUMIN 1.5*  --   --  1.8*  --   --    BILITOT 0.2  --   --  0.4  --   --    ALKPHOS 74  --   --  81  --   --    AST 25  --   --  15  --   --    ALT 15  --   --  17  --   --    ANIONGAP 12  < > 10  --  12 9   EGFRNONAA 21.8*  < > 21.8*  --  28.1* 32.7*   < > = values in this interval not displayed.    Significant Imaging: I have reviewed all pertinent imaging results/findings within the past 24 hours.     CT chest:  1. Progression of bilateral patchy and confluent pulmonary consolidation compatible with pneumonia less likely edema or noninfectious inflammation.  There is air noted throughout the esophagus.  2. Stable markedly enlarged right thyroid lobe with large hypodense nodule measuring 3.2 x 2.4 x 4.7 cm.  3. Right renal cyst.  4. Enlarged lobular uterus with calcifications compatible with fibroids.  5. Additional findings as above.     Microbiology:  4/5 blood cx negative  4/5 urine cx negative  4/10 C.diff negative  4/11 blood cx negative  4/11 BAL cx NGTD, cytology with acute inflammation; GMS negative  4/11 aspergillus antigen negative  4/11 fungitell negative  4/11 urine histo negative  4/11 urine blasto negative  4/11 serum crypto negative  4/11 RVP pending  4/11 urine legionella negative

## 2018-04-17 NOTE — ASSESSMENT & PLAN NOTE
--sCr uptrending, increased to 2.1, with baseline 0.9 on admission.  --possibly ATN from hypoperfusion, bactrim and Vancomycin in conjunction with Pip/Tazo in setting of contrast exposure  --FEUrea 46%, clinically hypervolemic on exam with edema in BLE and plump IVC.   --appreciate nephrology assistance  --trend BMP q8

## 2018-04-17 NOTE — PLAN OF CARE
Problem: Patient Care Overview  Goal: Plan of Care Review  Outcome: Ongoing (interventions implemented as appropriate)  No acute events throughout shift. Pt only opened eyes on command twice throughout the shift, no other commands followed. Corneal, gag, cough present. All VSS, see flowsheet for assessment. Pt remains on AC setting on vent, FiO2 60% peep 8. Max temp overnight was 100.4 axillary. Hill output >2L this shift. Flexi intact, ~100 output. Tube feeds remain at 40cc/hr, tolerating well. POC reviewed with patient and daughter, all questions and concerns addressed. Will continue to monitor.

## 2018-04-17 NOTE — SUBJECTIVE & OBJECTIVE
Interval History: NAEON, Very good UO 4810 ml/24hrs. Hemodynamically stable off pressors. Vent support improving. Improving sCr 1.7 mg/dL with improved K 4.6 mmol/L. Volume status improving. FiO2 improved to 50% and PEEP 8.    Review of patient's allergies indicates:  No Known Allergies  Current Facility-Administered Medications   Medication Frequency    acetaminophen tablet 650 mg Q8H PRN    albuterol-ipratropium 2.5mg-0.5mg/3mL nebulizer solution 3 mL Q4H PRN    albuterol-ipratropium 2.5mg-0.5mg/3mL nebulizer solution 3 mL Q4H    atovaquone suspension 1,500 mg Daily    chlorhexidine 0.12 % solution 15 mL BID    clopidogrel tablet 75 mg QHS    dexmedetomidine (PRECEDEX) 400mcg/100mL 0.9% NaCL infusion Continuous    dextrose 50% injection 12.5 g PRN    fentaNYL 2500 mcg in 0.9% sodium chloride 250 mL infusion premix (titrating) Continuous    fentaNYL injection 50 mcg Q2H PRN    glucagon (human recombinant) injection 1 mg PRN    heparin (porcine) injection 5,000 Units Q8H    hydroxychloroquine tablet 400 mg Daily    insulin aspart U-100 pen 1-10 Units Q4H PRN    insulin aspart U-100 pen 3 Units Q24H    insulin aspart U-100 pen 3 Units Q24H    insulin aspart U-100 pen 3 Units Q24H    insulin aspart U-100 pen 3 Units Q24H    insulin aspart U-100 pen 3 Units Q24H    insulin aspart U-100 pen 3 Units Q24H    magnesium sulfate 2g in water 50mL IVPB (premix) PRN    magnesium sulfate 2g in water 50mL IVPB (premix) PRN    methylPREDNISolone sodium succinate injection 80 mg Q12H    pantoprazole suspension 40 mg Daily    piperacillin-tazobactam 4.5 g in sodium chloride 0.9% 100 mL IVPB (ready to mix system) Q8H    polyethylene glycol packet 17 g Daily    posaconazole 300 mg in dextrose 5 % 150 mL infusion Daily    potassium, sodium phosphates 280-160-250 mg packet 2 packet PRN    potassium, sodium phosphates 280-160-250 mg packet 2 packet PRN    potassium, sodium phosphates 280-160-250 mg packet 2  packet PRN    propofol (DIPRIVAN) 10 mg/mL infusion Continuous    senna-docusate 8.6-50 mg per tablet 1 tablet BID    sodium chloride 0.9% flush 5 mL PRN    white petrolatum-mineral oil (LUBIFRESH P.M.) ophthalmic ointment Q8H       Objective:     Vital Signs (Most Recent):  Temp: 98.6 °F (37 °C) (04/17/18 1100)  Pulse: 86 (04/17/18 1300)  Resp: 19 (04/17/18 1300)  BP: 131/63 (04/17/18 1300)  SpO2: 97 % (04/17/18 1300)  O2 Device (Oxygen Therapy): ventilator (04/17/18 1300) Vital Signs (24h Range):  Temp:  [97.2 °F (36.2 °C)-100.4 °F (38 °C)] 98.6 °F (37 °C)  Pulse:  [] 86  Resp:  [7-56] 19  SpO2:  [86 %-99 %] 97 %  BP: (112-214)/(55-87) 131/63  Arterial Line BP: (106-151)/(41-67) 115/51     Weight: 71.5 kg (157 lb 10.1 oz) (04/16/18 1400)  Body mass index is 26.23 kg/m².  Body surface area is 1.81 meters squared.    I/O last 3 completed shifts:  In: 2606.9 [I.V.:136.9; NG/GT:1870; IV Piggyback:600]  Out: 6570 [Urine:6060; Drains:10; Stool:500]    Physical Exam   Constitutional: She appears well-developed and well-nourished. She appears ill. No distress. She is sedated and intubated.   HENT:   Head: Normocephalic and atraumatic.   Eyes: Conjunctivae are normal. Pupils are equal, round, and reactive to light.   Neck: Trachea normal. Neck supple. No JVD present.   Cardiovascular: Normal rate, regular rhythm, S1 normal, S2 normal, intact distal pulses and normal pulses.  Exam reveals no gallop and no friction rub.    No murmur heard.  Pulmonary/Chest: She is intubated. She has decreased breath sounds in the right lower field and the left lower field. She has no wheezes. She has rales in the right lower field and the left lower field.   Abdominal: Soft. Bowel sounds are normal. She exhibits no distension. There is no tenderness.   Musculoskeletal: Normal range of motion. She exhibits edema (trace pitting edema LE and sacral area).   Neurological:   Sedated on mech ventilation   Skin: Skin is warm and dry.  Capillary refill takes less than 2 seconds.   Psychiatric:   Sedated     Vitals reviewed.      Significant Labs:  ABGs:     Recent Labs  Lab 04/17/18  1129   PH 7.512*   PCO2 41.8   HCO3 33.5*   POCSATURATED 92*   BE 11     BMP:     Recent Labs  Lab 04/17/18  0358 04/17/18  0800   GLU  --  186*   CL  --  107   CO2  --  30*   BUN  --  90*   CREATININE  --  1.7*   CALCIUM  --  9.0   MG 2.3  --      Cardiac Markers: No results for input(s): CKMB, TROPONINT, MYOGLOBIN in the last 168 hours.  CBC:     Recent Labs  Lab 04/17/18  0358   WBC 20.79*   RBC 3.77*   HGB 9.0*   HCT 28.6*   *   MCV 76*   MCH 23.9*   MCHC 31.5*       Recent Labs  Lab 04/15/18  1407   COLORU Straw   SPECGRAV 1.010   PHUR 5.0   PROTEINUA Negative   NITRITE Negative   LEUKOCYTESUR Negative   UROBILINOGEN Negative   HYALINECASTS 16*     All labs within the past 24 hours have been reviewed.     Significant Imaging:  Labs: Reviewed  US: Reviewed

## 2018-04-17 NOTE — PLAN OF CARE
Notified by RN of new Afib RVR. At bedside to find patient with 's. EKG afib. Not hypotensive. Diuresed about 3L throughout day. Poor ventilatory synchrony and RR 40's.     -- Increased sedation with improved synchrony   -- 500 cc fluid bolus given significant diuresis   -- Metoprolol IVP at bedside as tolerated by BP for improved rate control. Start PO and up titrate.   -- If rate control difficult may ultimately need some amiodarone although I would prefer to avoid if possible given significant underlying parenchymal lung disease and respiratory failure.       Nathaniel Meraz M.D.   Pulmonary/Critical Care Fellow- PGY VIII

## 2018-04-17 NOTE — NURSING
MD Meraz at bedside. Pt with a fib 's, RR 40-50s. Metoprolol 7.5 mg total administered. 500 cc LR bolus administered. RR down to 30s, HR 120s. Per MD will tolerate HR 120s. Will notify MD if HR 150s consistently or if pt becomes hemodynamically unstable. WCTM.

## 2018-04-17 NOTE — ASSESSMENT & PLAN NOTE
81yo woman w/a history of prior CVA, RA (dx 2012; RF seropositive, erosive; on HCQ/prednisone 5mg and recently started humira first dose 3/22/2018), IBS, and Hasimotos thyroiditis (goiter) who was admitted on 4/5/2018 with 2 days of dry cough and progressive SOB due to multifocal PNA (with peripheral and basilar lesions noted on CT chest) that has progressively worsened despite CAP coverage x5 days, requiring intubation on 4/12 for hypoxic RF. She remains critically ill with slow improvement on expanded coverage for all possibilities on differential of hypoxic RF (most notable for MDRO bacterial HCAP/HAP, IFI including Aspergillus and endemic fungi, and  vs hypersensitivity pneumonitis). Course c/b KERMIT (now resolved after empiric bactrim stopped with negative GMS). Etiology remains unknown despite her slow improvement.    - would continue pip-tazo at this time for possible HAP/aspiration (given acute inflammation noted on cytology from bronch and no growth from cultures; will offer empiric 7 day course)   - would continue empiric posaconazole for coverage of the above mold species and await pending fungal cultures; all fungal markers are negative  - bactrim prophylaxis transitioned to atovaquone given recent KERMIT  - may continue steroids for now at discretion of CC team as we cannot definitively exclude /HP  - await pending BAL cultures and will tailor therapy with you as she improves

## 2018-04-17 NOTE — ASSESSMENT & PLAN NOTE
--Suspect secondary to severe bilateral pneumonia in the setting of immunosuppression; possibly  vs reaction from Humira   --Severely worsened over first week of admission despite CAP treatment.  --s/p emergent intubation on 4/11 upon MICU admission for severe hypoxemia and severe respiratory distress.  --Continue mechanical ventilation, wean support as tolerated.  --Bronchoscopy on 4/11 with wash; culture with few WBCs, no organisms.  --Antibiotics broadened to vancomycin, zosyn, bactrim, and posaconazole initally; appreciate ID assistance with management. Will continue zosyn, posiconazole for now; change bactrim to atovoquone for PCP prophylaxis given KERMIT  --Blood, sputum, and urine cultures negative from 4/10.   --KOH prep negative, AFB stain with no acid fast bacilli noted, fungal culture pending  --Aspergillus, Histoplasma, and Cryptococcal antigen in blood is negative. Legionella and blasomyces negative.   --Cytology and viral panel pending.  --Continue antibiotics and IV steroids. Preliminary GMS stain negative, Bactrim (treatment dose) discontinued 4/13.  Remains on prophylaxis dose of atovaquone now   --P/F ratio remains around 100; Low peep as she has not been peep responsive.   --CXR with mild improvement however continues to require significant oxygen.  --continue lasix as tolerated

## 2018-04-17 NOTE — PROGRESS NOTES
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 She was seen by her PCP on 4/2 for these complaints and received a prescription for duo nebs and tessalon perles. CXR performed on 4/2 noted bilateral basilar infiltrates. At that time, she denied fever, chills, sputum production, sick contacts. She was admitted to  and treated emprically for CAP with rocephin and azithromycin. She was also diuresed daily. However, her oxygen requirements slowly increased from 2 L NC to 4 L NC with also progressively worsening bilateral infiltrates on imaging. CTA performed on 4/9 negative for PE, however noted significant bilateral consolidations. On 4/11, her hypoxemia continued to worsen, requiring NRB mask, and she developed severe respiratory distress. She was transferred to the MICU and emergently intubated. Repeat CT chest performed on 4/11 demonstrated progressive worsening of bilateral peripheral infiltrates.    Hospital/ICU Course:  Admitted to MICU on 4/11, intubated emergently for severe hypoxemia and respiratory distress. ID was consulted for assistance in management given immunocompromised state. Patient paralyzed with nimbex following persistent dysynchrony with vent despite sedation. Abx broadened to vancomycin, zosyn, bactrim, and posaconazole. Norepinephrine initiated for BP support.  Bronchoscopy performed at bedside on 4/11.   Nimbex discontinued on 4/12. She continues to require significant oxygen support, and unable to wean oxygen requirements significantly since 4/12.     Interval History/Significant Events: Off sedation (propofol and fentanyl) since 12:30 yesterday. Tmax 100.4 overnight    Review of Systems   Unable to perform ROS: Intubated     Objective:     Vital Signs (Most Recent):  Temp: 99 °F (37.2 °C) (04/17/18 0705)  Pulse: 96 (04/17/18 0719)  Resp: (!) 36 (04/17/18 0719)  BP: (!) 157/70 (04/17/18 0719)  SpO2: (!) 93 % (04/17/18 0719) Vital Signs (24h Range):  Temp:  [97.2 °F (36.2 °C)-100.4 °F (38 °C)] 99 °F (37.2 °C)  Pulse:  [] 96  Resp:  [7-56] 36  SpO2:  [87 %-95 %] 93 %  BP: (100-157)/(51-72) 157/70  Arterial Line BP: (102-151)/(38-64) 142/59   Weight: 71.5 kg (157 lb 10.1 oz)  Body mass index is 26.23 kg/m².      Intake/Output Summary (Last 24 hours) at 04/17/18 0805  Last data filed at 04/17/18 0700   Gross per 24 hour   Intake          1818.27 ml   Output             4785 ml   Net         -2966.73 ml       Physical Exam   Constitutional: She appears well-developed. She appears lethargic. She does not have a sickly appearance. She is sedated and intubated.   HENT:   Head: Normocephalic and atraumatic.   Eyes: Conjunctivae and EOM are normal. Pupils are equal, round, and reactive to light. Right eye exhibits no exudate. Left eye exhibits no exudate. Right conjunctiva has no hemorrhage. Left conjunctiva has no hemorrhage. Right eye exhibits no nystagmus. Left eye exhibits no nystagmus.   Neck: Trachea normal. No neck rigidity. No tracheal deviation present.       Cardiovascular: Regular rhythm and normal heart sounds.   Occasional extrasystoles are present. PMI is not displaced.  Exam reveals no gallop and no friction rub.    No murmur heard.  Pulses:       Radial pulses are 2+ on the right side, and 2+ on the left side.        Dorsalis pedis pulses are 2+ on the right  side, and 2+ on the left side.        Posterior tibial pulses are 2+ on the right side, and 2+ on the left side.   Pulmonary/Chest: Effort normal and breath sounds normal. Tachypnea noted. She is intubated. She has no rhonchi.   Breath sounds coarse bilaterally   Abdominal: Soft. Normal appearance and bowel sounds are normal. There is no tenderness.   Genitourinary:   Genitourinary Comments: Hill draining clear, yellow urine   Musculoskeletal: Normal range of motion.   Neurological: She has normal strength. She appears lethargic. GCS eye subscore is 3. GCS verbal subscore is 1. GCS motor subscore is 5.   Skin: Skin is warm and dry. Capillary refill takes 2 to 3 seconds. No cyanosis. Nails show no clubbing.   Nursing note and vitals reviewed.      Vents:  Vent Mode: A/C (04/17/18 0719)  Set Rate: 26 bmp (04/17/18 0719)  Vt Set: 370 mL (04/17/18 0719)  Pressure Support: 0 cmH20 (04/17/18 0719)  PEEP/CPAP: 8 cmH20 (04/17/18 0719)  Oxygen Concentration (%): 40 (04/17/18 0719)  Peak Airway Pressure: 41 cmH2O (04/17/18 0719)  Plateau Pressure: 25 cmH20 (04/17/18 0719)  Total Ve: 15.1 mL (04/17/18 0719)  F/VT Ratio<105 (RSBI): (!) 86.33 (04/17/18 0719)  Lines/Drains/Airways     Central Venous Catheter Line                 Percutaneous Central Line Insertion/Assessment - triple lumen  04/11/18 1513 right subclavian 5 days          Drain                 NG/OG Tube 04/11/18 1340 Left mouth 5 days         Urethral Catheter 04/11/18 1430 Double-lumen 16 Fr. 5 days         Rectal Tube 04/16/18 0600 rectal tube w/ balloon (indicate number of mLs) 1 day          Airway                 Airway - Non-Surgical 04/11/18 1337 Endotracheal Tube 5 days          Arterial Line                 Arterial Line 04/11/18 1540 Right Radial 5 days              Significant Labs:    CBC/Anemia Profile:    Recent Labs  Lab 04/16/18  0357 04/17/18  0358   WBC 19.56* 20.79*   HGB 7.7* 9.0*   HCT 24.4* 28.6*    425*   MCV 76* 76*   RDW 16.5*  17.1*        Chemistries:    Recent Labs  Lab 04/16/18  0357 04/16/18  1529 04/16/18  2334 04/17/18  0358    144 145  --    K 5.2* 5.3* 4.8  --     105 104  --    CO2 26 32* 31*  --    BUN 80* 95* 100*  --    CREATININE 2.1* 2.2* 2.1*  --    CALCIUM 8.0* 8.5* 8.6*  --    ALBUMIN 1.5*  --   --  1.8*   PROT 5.5*  --   --  6.4   BILITOT 0.2  --   --  0.4   ALKPHOS 74  --   --  81   ALT 15  --   --  17   AST 25  --   --  15   MG 2.5  --   --  2.3   PHOS 5.3*  --   --  3.7       All pertinent labs within the past 24 hours have been reviewed.    Significant Imaging:  I have reviewed all pertinent imaging results/findings within the past 24 hours.    Assessment/Plan:     Neuro   Encephalopathy, metabolic    --suspect secondary to sedatives for ventilator dyssynchrony and critical illness.    --off sedation since 12:30 4/16          Cerebrovascular accident (CVA)    --Remote lacunar infarcts in the right centrum semi-ovale, basal ganglia, and right tatyana per CTH w/o contrast on 12/2017.  --Continue home clopidogrel.        Pulmonary   * Acute hypoxemic respiratory failure    --Suspect secondary to severe bilateral pneumonia in the setting of immunosuppression; possibly  vs reaction from Humira   --Severely worsened over first week of admission despite CAP treatment.  --s/p emergent intubation on 4/11 upon MICU admission for severe hypoxemia and severe respiratory distress.  --Continue mechanical ventilation, wean support as tolerated.  --Bronchoscopy on 4/11 with wash; culture with few WBCs, no organisms.  --Antibiotics broadened to vancomycin, zosyn, bactrim, and posaconazole initally; appreciate ID assistance with management. Will continue zosyn, posiconazole for now; change bactrim to atovoquone for PCP prophylaxis given KERMIT  --Blood, sputum, and urine cultures negative from 4/10.   --KOH prep negative, AFB stain with no acid fast bacilli noted, fungal culture pending  --Aspergillus, Histoplasma, and  "Cryptococcal antigen in blood is negative. Legionella and blasomyces negative.   --Cytology and viral panel pending.  --Continue antibiotics and IV steroids. Preliminary GMS stain negative, Bactrim (treatment dose) discontinued 4/13.  Remains on prophylaxis dose of atovaquone now   --P/F ratio remains around 100; Low peep as she has not been peep responsive.   --CXR with mild improvement however continues to require significant oxygen.  --continue lasix as tolerated           ARDS (adult respiratory distress syndrome)    - See "Acute hypoxemic respiratory failure".        Pneumonia of both upper lobes due to infectious organism    --see above        Cardiac/Vascular   Hypertension, essential    --continue to hold htn meds due to hypotension        Renal/   Hyperkalemia    --trend        KERMIT (acute kidney injury)    --sCr uptrending, increased to 2.1, with baseline 0.9 on admission.  --possibly ATN from hypoperfusion, bactrim and Vancomycin in conjunction with Pip/Tazo in setting of contrast exposure  --FEUrea 46%, clinically hypervolemic on exam with edema in BLE and plump IVC.   --appreciate nephrology assistance  --trend BMP q8          ID   Septic shock    --Likely secondary to pneumonia with iatrogenic component given amount of sedation need to keep the patient synchronous with ventilator.  --levophed d/c 4/16  --Continue antibiotics. Lactate wnl.  --ID following        Endocrine   On tube feeding diet    --appreciate nutrition assistance.  --tolerating enteral feedings at goal 40 cc/hr        Hyperglycemia    --Likely steroid-induced.  --transitioned from gtt to SQ insulin.  --Goal -180.        Thyroid enlargement    --History of Hashimoto's thyroiditis.  --Intrathoracic extension on CT.  --TSH and free T4 wnl.          GI   PUD (peptic ulcer disease)    --Continue PPI.        Orthopedic   Seropositive rheumatoid arthritis of multiple sites    --Appreciate Rheumatology assistance.  --Continue " hydroxychloroquine.  --Hold home prednisone; switched to IV SoluMedrol 80mg Q12h.        Other   Long term (current) use of systemic steroids    - See above           Critical Care Daily Checklist:    A: Awake: RASS Goal/Actual Goal: RASS Goal: -2-->light sedation  Actual: Monzon Agitation Sedation Scale (RASS): Light sedation   B: Spontaneous Breathing Trial Performed?     C: SAT & SBT Coordinated?  yes                      D: Delirium: CAM-ICU Overall CAM-ICU: Positive   E: Early Mobility Performed? No   F: Feeding Goal: Goals: Pt to receive >85% EEN and EPN  Status: Nutrition Goal Status: goal met   Current Diet Order   Procedures    Diet NPO      AS: Analgesia/Sedation holding   T: Thromboembolic Prophylaxis Scds, heparin   H: HOB > 300 Yes   U: Stress Ulcer Prophylaxis (if needed) ppi   G: Glucose Control ssi   B: Bowel Function Stool Occurrence: 1   I: Indwelling Catheter (Lines & Parikh) Necessity parikh   D: De-escalation of Antimicrobials/Pharmacotherapies n/a    Plan for the day/ETD sbt    Code Status:  Family/Goals of Care: Full Code  Wean vent as tolerated     Critical Care Time: 45 minutes  Critical secondary to Patient has a condition that poses threat to life and bodily function: Acute hypoxemic respiratory failure      Critical care was time spent personally by me on the following activities: development of treatment plan with patient or surrogate and bedside caregivers, discussions with consultants, evaluation of patient's response to treatment, examination of patient, ordering and performing treatments and interventions, ordering and review of laboratory studies, ordering and review of radiographic studies, pulse oximetry, re-evaluation of patient's condition. This critical care time did not overlap with that of any other provider or involve time for any procedures.     Caio Nevarez, NP  Critical Care Medicine  [facility]

## 2018-04-17 NOTE — ASSESSMENT & PLAN NOTE
KERMIT non oliguric stage 2 by KDIGO, suspect multifactorial tubular injury suspecting ATN from hypoperfusion, bactrim and Vancomycin in conjunction with Pip/Tazo in setting of contrast exposure. 72 Hrs after exposure her sCr rised by 0.3 mg/dL. Suspect all this factors contributed to her KERMIT    · Kyle 83,   · UPCR undetectable, wrights stain negative  · US renal Normal size kidneys withR kidney simple cyst no hydronephrosis seen  · Adequate rsponse to diuretics. Had excellent UO overnight but Ph 7.5 and bicarb 30 suggest contraction effect recommend to hold Diuretics as of now.  · Please keep SBP > 90 and MAP > 65  · Consider close monitor of Vanc Through levels if there is another option for Pip/Tazo with Vanc combination would probably be less toxic to kidneys  · Bactrim stopped  · Trend electrolytes   · continue to monitor strict I/O's, daily weights, renally dose medications, and avoid nephrotoxic agents

## 2018-04-17 NOTE — ASSESSMENT & PLAN NOTE
--Likely secondary to pneumonia with iatrogenic component given amount of sedation need to keep the patient synchronous with ventilator.  --levophed d/c 4/16  --Continue antibiotics. Lactate wnl.  --ID following

## 2018-04-18 PROBLEM — A41.9 SEPTIC SHOCK: Status: RESOLVED | Noted: 2018-04-11 | Resolved: 2018-04-18

## 2018-04-18 PROBLEM — R65.21 SEPTIC SHOCK: Status: RESOLVED | Noted: 2018-04-11 | Resolved: 2018-04-18

## 2018-04-18 PROBLEM — I48.91 ATRIAL FIBRILLATION WITH RVR: Status: ACTIVE | Noted: 2018-04-18

## 2018-04-18 PROBLEM — D72.829 LEUKOCYTOSIS: Status: ACTIVE | Noted: 2018-04-18

## 2018-04-18 PROBLEM — E87.0 HYPERNATREMIA: Status: ACTIVE | Noted: 2018-04-18

## 2018-04-18 PROBLEM — E87.5 HYPERKALEMIA: Status: RESOLVED | Noted: 2018-04-15 | Resolved: 2018-04-18

## 2018-04-18 PROBLEM — E87.3 METABOLIC ALKALOSIS: Status: ACTIVE | Noted: 2018-04-18

## 2018-04-18 LAB
ALBUMIN SERPL BCP-MCNC: 1.7 G/DL
ALLENS TEST: ABNORMAL
ALP SERPL-CCNC: 70 U/L
ALT SERPL W/O P-5'-P-CCNC: 14 U/L
ANION GAP SERPL CALC-SCNC: 10 MMOL/L
ANION GAP SERPL CALC-SCNC: 13 MMOL/L
ANION GAP SERPL CALC-SCNC: 13 MMOL/L
ANION GAP SERPL CALC-SCNC: 8 MMOL/L
AST SERPL-CCNC: 13 U/L
BACTERIA #/AREA URNS AUTO: ABNORMAL /HPF
BASOPHILS # BLD AUTO: 0.02 K/UL
BASOPHILS NFR BLD: 0.1 %
BILIRUB DIRECT SERPL-MCNC: 0.2 MG/DL
BILIRUB SERPL-MCNC: 0.3 MG/DL
BILIRUB UR QL STRIP: NEGATIVE
BUN SERPL-MCNC: 71 MG/DL
BUN SERPL-MCNC: 75 MG/DL
BUN SERPL-MCNC: 76 MG/DL
BUN SERPL-MCNC: 78 MG/DL
CALCIUM SERPL-MCNC: 8.2 MG/DL
CALCIUM SERPL-MCNC: 8.3 MG/DL
CALCIUM SERPL-MCNC: 8.4 MG/DL
CALCIUM SERPL-MCNC: 8.5 MG/DL
CHLORIDE SERPL-SCNC: 108 MMOL/L
CHLORIDE SERPL-SCNC: 109 MMOL/L
CHLORIDE SERPL-SCNC: 111 MMOL/L
CHLORIDE SERPL-SCNC: 111 MMOL/L
CLARITY UR REFRACT.AUTO: ABNORMAL
CO2 SERPL-SCNC: 31 MMOL/L
CO2 SERPL-SCNC: 31 MMOL/L
CO2 SERPL-SCNC: 32 MMOL/L
CO2 SERPL-SCNC: 32 MMOL/L
COLOR UR AUTO: YELLOW
CREAT SERPL-MCNC: 1.3 MG/DL
CREAT SERPL-MCNC: 1.4 MG/DL
CREAT SERPL-MCNC: 1.5 MG/DL
CREAT SERPL-MCNC: 1.5 MG/DL
DELSYS: ABNORMAL
DIFFERENTIAL METHOD: ABNORMAL
EOSINOPHIL # BLD AUTO: 0 K/UL
EOSINOPHIL NFR BLD: 0 %
ERYTHROCYTE [DISTWIDTH] IN BLOOD BY AUTOMATED COUNT: 17.5 %
ERYTHROCYTE [SEDIMENTATION RATE] IN BLOOD BY WESTERGREN METHOD: 16 MM/H
ERYTHROCYTE [SEDIMENTATION RATE] IN BLOOD BY WESTERGREN METHOD: 16 MM/H
EST. GFR  (AFRICAN AMERICAN): 37.7 ML/MIN/1.73 M^2
EST. GFR  (AFRICAN AMERICAN): 37.7 ML/MIN/1.73 M^2
EST. GFR  (AFRICAN AMERICAN): 40.9 ML/MIN/1.73 M^2
EST. GFR  (AFRICAN AMERICAN): 44.8 ML/MIN/1.73 M^2
EST. GFR  (NON AFRICAN AMERICAN): 32.7 ML/MIN/1.73 M^2
EST. GFR  (NON AFRICAN AMERICAN): 32.7 ML/MIN/1.73 M^2
EST. GFR  (NON AFRICAN AMERICAN): 35.5 ML/MIN/1.73 M^2
EST. GFR  (NON AFRICAN AMERICAN): 38.8 ML/MIN/1.73 M^2
FIO2: 40
FIO2: 40
GLUCOSE SERPL-MCNC: 164 MG/DL
GLUCOSE SERPL-MCNC: 176 MG/DL
GLUCOSE SERPL-MCNC: 205 MG/DL
GLUCOSE SERPL-MCNC: 210 MG/DL
GLUCOSE UR QL STRIP: NEGATIVE
HCO3 UR-SCNC: 31.8 MMOL/L (ref 24–28)
HCO3 UR-SCNC: 33.6 MMOL/L (ref 24–28)
HCO3 UR-SCNC: 33.9 MMOL/L (ref 24–28)
HCO3 UR-SCNC: 34 MMOL/L (ref 24–28)
HCT VFR BLD AUTO: 30.7 %
HGB BLD-MCNC: 9.1 G/DL
HGB UR QL STRIP: NEGATIVE
IMM GRANULOCYTES # BLD AUTO: 0.26 K/UL
IMM GRANULOCYTES NFR BLD AUTO: 1.3 %
INR PPP: 1.3
KETONES UR QL STRIP: NEGATIVE
LEUKOCYTE ESTERASE UR QL STRIP: NEGATIVE
LYMPHOCYTES # BLD AUTO: 0.8 K/UL
LYMPHOCYTES NFR BLD: 3.9 %
MAGNESIUM SERPL-MCNC: 2.3 MG/DL
MCH RBC QN AUTO: 23.4 PG
MCHC RBC AUTO-ENTMCNC: 29.6 G/DL
MCV RBC AUTO: 79 FL
MICROSCOPIC COMMENT: ABNORMAL
MIN VOL: 13.5
MODE: ABNORMAL
MONOCYTES # BLD AUTO: 1.1 K/UL
MONOCYTES NFR BLD: 5.5 %
NEUTROPHILS # BLD AUTO: 17.7 K/UL
NEUTROPHILS NFR BLD: 89.2 %
NITRITE UR QL STRIP: NEGATIVE
NRBC BLD-RTO: 0 /100 WBC
PCO2 BLDA: 35.9 MMHG (ref 35–45)
PCO2 BLDA: 37.9 MMHG (ref 35–45)
PCO2 BLDA: 38.9 MMHG (ref 35–45)
PCO2 BLDA: 39 MMHG (ref 35–45)
PEEP: 5
PEEP: 8
PH SMN: 7.52 [PH] (ref 7.35–7.45)
PH SMN: 7.55 [PH] (ref 7.35–7.45)
PH SMN: 7.56 [PH] (ref 7.35–7.45)
PH SMN: 7.58 [PH] (ref 7.35–7.45)
PH UR STRIP: 5 [PH] (ref 5–8)
PHOSPHATE SERPL-MCNC: 3 MG/DL
PIP: 22
PLATELET # BLD AUTO: 470 K/UL
PMV BLD AUTO: 10.6 FL
PO2 BLDA: 57 MMHG (ref 80–100)
PO2 BLDA: 60 MMHG (ref 80–100)
PO2 BLDA: 63 MMHG (ref 80–100)
PO2 BLDA: 65 MMHG (ref 80–100)
POC BE: 11 MMOL/L
POC BE: 12 MMOL/L
POC BE: 12 MMOL/L
POC BE: 9 MMOL/L
POC SATURATED O2: 93 % (ref 95–100)
POC SATURATED O2: 94 % (ref 95–100)
POC SATURATED O2: 94 % (ref 95–100)
POC SATURATED O2: 95 % (ref 95–100)
POC TCO2: 33 MMOL/L (ref 23–27)
POC TCO2: 35 MMOL/L (ref 23–27)
POCT GLUCOSE: 116 MG/DL (ref 70–110)
POCT GLUCOSE: 167 MG/DL (ref 70–110)
POCT GLUCOSE: 190 MG/DL (ref 70–110)
POCT GLUCOSE: 195 MG/DL (ref 70–110)
POCT GLUCOSE: 203 MG/DL (ref 70–110)
POCT GLUCOSE: 209 MG/DL (ref 70–110)
POTASSIUM SERPL-SCNC: 4 MMOL/L
POTASSIUM SERPL-SCNC: 4.1 MMOL/L
POTASSIUM SERPL-SCNC: 4.8 MMOL/L
POTASSIUM SERPL-SCNC: 4.9 MMOL/L
PROT SERPL-MCNC: 6.4 G/DL
PROT UR QL STRIP: NEGATIVE
PROTHROMBIN TIME: 12.8 SEC
PROVIDER NOTIFIED: ABNORMAL
RBC # BLD AUTO: 3.89 M/UL
RBC #/AREA URNS AUTO: 3 /HPF (ref 0–4)
SAMPLE: ABNORMAL
SITE: ABNORMAL
SODIUM SERPL-SCNC: 151 MMOL/L
SODIUM SERPL-SCNC: 152 MMOL/L
SODIUM SERPL-SCNC: 153 MMOL/L
SODIUM SERPL-SCNC: 153 MMOL/L
SP GR UR STRIP: 1.02 (ref 1–1.03)
SP02: 100
SP02: 91
TIME NOTIFIED: 1040
URATE CRY UR QL COMP ASSIST: ABNORMAL
URN SPEC COLLECT METH UR: ABNORMAL
UROBILINOGEN UR STRIP-ACNC: NEGATIVE EU/DL
VT: 350
VT: 370
WBC # BLD AUTO: 19.88 K/UL
WBC #/AREA URNS AUTO: 2 /HPF (ref 0–5)

## 2018-04-18 PROCEDURE — 82803 BLOOD GASES ANY COMBINATION: CPT

## 2018-04-18 PROCEDURE — 63600175 PHARM REV CODE 636 W HCPCS: Performed by: NURSE PRACTITIONER

## 2018-04-18 PROCEDURE — 80076 HEPATIC FUNCTION PANEL: CPT

## 2018-04-18 PROCEDURE — 80048 BASIC METABOLIC PNL TOTAL CA: CPT | Mod: 91

## 2018-04-18 PROCEDURE — 94640 AIRWAY INHALATION TREATMENT: CPT

## 2018-04-18 PROCEDURE — 25000003 PHARM REV CODE 250: Performed by: NURSE PRACTITIONER

## 2018-04-18 PROCEDURE — 25000242 PHARM REV CODE 250 ALT 637 W/ HCPCS: Performed by: INTERNAL MEDICINE

## 2018-04-18 PROCEDURE — 36592 COLLECT BLOOD FROM PICC: CPT

## 2018-04-18 PROCEDURE — 99900035 HC TECH TIME PER 15 MIN (STAT)

## 2018-04-18 PROCEDURE — 20000000 HC ICU ROOM

## 2018-04-18 PROCEDURE — 81001 URINALYSIS AUTO W/SCOPE: CPT

## 2018-04-18 PROCEDURE — 83735 ASSAY OF MAGNESIUM: CPT

## 2018-04-18 PROCEDURE — 99233 SBSQ HOSP IP/OBS HIGH 50: CPT | Mod: GC,ICN,, | Performed by: INTERNAL MEDICINE

## 2018-04-18 PROCEDURE — 85610 PROTHROMBIN TIME: CPT

## 2018-04-18 PROCEDURE — 94003 VENT MGMT INPAT SUBQ DAY: CPT

## 2018-04-18 PROCEDURE — 99900026 HC AIRWAY MAINTENANCE (STAT)

## 2018-04-18 PROCEDURE — 37799 UNLISTED PX VASCULAR SURGERY: CPT

## 2018-04-18 PROCEDURE — 25000003 PHARM REV CODE 250: Performed by: STUDENT IN AN ORGANIZED HEALTH CARE EDUCATION/TRAINING PROGRAM

## 2018-04-18 PROCEDURE — 27200966 HC CLOSED SUCTION SYSTEM

## 2018-04-18 PROCEDURE — 27000221 HC OXYGEN, UP TO 24 HOURS

## 2018-04-18 PROCEDURE — 85025 COMPLETE CBC W/AUTO DIFF WBC: CPT

## 2018-04-18 PROCEDURE — 80048 BASIC METABOLIC PNL TOTAL CA: CPT

## 2018-04-18 PROCEDURE — 87040 BLOOD CULTURE FOR BACTERIA: CPT | Mod: 59

## 2018-04-18 PROCEDURE — 63600175 PHARM REV CODE 636 W HCPCS: Performed by: HOSPITALIST

## 2018-04-18 PROCEDURE — 25000003 PHARM REV CODE 250: Performed by: INTERNAL MEDICINE

## 2018-04-18 PROCEDURE — 87070 CULTURE OTHR SPECIMN AEROBIC: CPT

## 2018-04-18 PROCEDURE — 94761 N-INVAS EAR/PLS OXIMETRY MLT: CPT

## 2018-04-18 PROCEDURE — 87205 SMEAR GRAM STAIN: CPT

## 2018-04-18 PROCEDURE — 84100 ASSAY OF PHOSPHORUS: CPT

## 2018-04-18 PROCEDURE — 99291 CRITICAL CARE FIRST HOUR: CPT | Mod: ,,, | Performed by: NURSE PRACTITIONER

## 2018-04-18 RX ORDER — POLYETHYLENE GLYCOL 3350 17 G/17G
17 POWDER, FOR SOLUTION ORAL DAILY
Status: DISCONTINUED | OUTPATIENT
Start: 2018-04-19 | End: 2018-04-18

## 2018-04-18 RX ORDER — PROCHLORPERAZINE EDISYLATE 5 MG/ML
10 INJECTION INTRAMUSCULAR; INTRAVENOUS ONCE
Status: COMPLETED | OUTPATIENT
Start: 2018-04-18 | End: 2018-04-18

## 2018-04-18 RX ADMIN — CLOPIDOGREL 75 MG: 75 TABLET, FILM COATED ORAL at 08:04

## 2018-04-18 RX ADMIN — DEXMEDETOMIDINE HYDROCHLORIDE 1.4 MCG/KG/HR: 100 INJECTION, SOLUTION, CONCENTRATE INTRAVENOUS at 06:04

## 2018-04-18 RX ADMIN — CHLORHEXIDINE GLUCONATE 15 ML: 1.2 RINSE ORAL at 08:04

## 2018-04-18 RX ADMIN — AMIODARONE HYDROCHLORIDE 0.5 MG/MIN: 1.8 INJECTION, SOLUTION INTRAVENOUS at 03:04

## 2018-04-18 RX ADMIN — INSULIN ASPART 3 UNITS: 100 INJECTION, SOLUTION INTRAVENOUS; SUBCUTANEOUS at 12:04

## 2018-04-18 RX ADMIN — POSACONAZOLE 400 MG: 40 SUSPENSION ORAL at 08:04

## 2018-04-18 RX ADMIN — METHYLPREDNISOLONE SODIUM SUCCINATE 80 MG: 125 INJECTION, POWDER, FOR SOLUTION INTRAMUSCULAR; INTRAVENOUS at 08:04

## 2018-04-18 RX ADMIN — INSULIN ASPART 3 UNITS: 100 INJECTION, SOLUTION INTRAVENOUS; SUBCUTANEOUS at 08:04

## 2018-04-18 RX ADMIN — MINERAL OIL AND WHITE PETROLATUM: 150; 830 OINTMENT OPHTHALMIC at 05:04

## 2018-04-18 RX ADMIN — POLYETHYLENE GLYCOL 3350 17 G: 17 POWDER, FOR SOLUTION ORAL at 08:04

## 2018-04-18 RX ADMIN — POSACONAZOLE 400 MG: 40 SUSPENSION ORAL at 07:04

## 2018-04-18 RX ADMIN — HYDROXYCHLOROQUINE SULFATE 400 MG: 200 TABLET, FILM COATED ORAL at 08:04

## 2018-04-18 RX ADMIN — PIPERACILLIN AND TAZOBACTAM 4.5 G: 4; .5 INJECTION, POWDER, LYOPHILIZED, FOR SOLUTION INTRAVENOUS; PARENTERAL at 05:04

## 2018-04-18 RX ADMIN — STANDARDIZED SENNA CONCENTRATE AND DOCUSATE SODIUM 1 TABLET: 8.6; 5 TABLET, FILM COATED ORAL at 08:04

## 2018-04-18 RX ADMIN — IPRATROPIUM BROMIDE AND ALBUTEROL SULFATE 3 ML: .5; 3 SOLUTION RESPIRATORY (INHALATION) at 11:04

## 2018-04-18 RX ADMIN — HEPARIN SODIUM 5000 UNITS: 5000 INJECTION, SOLUTION INTRAVENOUS; SUBCUTANEOUS at 02:04

## 2018-04-18 RX ADMIN — IPRATROPIUM BROMIDE AND ALBUTEROL SULFATE 3 ML: .5; 3 SOLUTION RESPIRATORY (INHALATION) at 03:04

## 2018-04-18 RX ADMIN — DEXMEDETOMIDINE HYDROCHLORIDE 0.3 MCG/KG/HR: 100 INJECTION, SOLUTION, CONCENTRATE INTRAVENOUS at 08:04

## 2018-04-18 RX ADMIN — HEPARIN SODIUM 5000 UNITS: 5000 INJECTION, SOLUTION INTRAVENOUS; SUBCUTANEOUS at 09:04

## 2018-04-18 RX ADMIN — PIPERACILLIN AND TAZOBACTAM 4.5 G: 4; .5 INJECTION, POWDER, LYOPHILIZED, FOR SOLUTION INTRAVENOUS; PARENTERAL at 02:04

## 2018-04-18 RX ADMIN — HEPARIN SODIUM 5000 UNITS: 5000 INJECTION, SOLUTION INTRAVENOUS; SUBCUTANEOUS at 05:04

## 2018-04-18 RX ADMIN — IPRATROPIUM BROMIDE AND ALBUTEROL SULFATE 3 ML: .5; 3 SOLUTION RESPIRATORY (INHALATION) at 07:04

## 2018-04-18 RX ADMIN — AMIODARONE HYDROCHLORIDE 0.5 MG/MIN: 1.8 INJECTION, SOLUTION INTRAVENOUS at 01:04

## 2018-04-18 RX ADMIN — PANTOPRAZOLE SODIUM 40 MG: 40 GRANULE, DELAYED RELEASE ORAL at 08:04

## 2018-04-18 RX ADMIN — FENTANYL CITRATE 50 MCG: 50 INJECTION, SOLUTION INTRAMUSCULAR; INTRAVENOUS at 10:04

## 2018-04-18 RX ADMIN — MINERAL OIL AND WHITE PETROLATUM: 150; 830 OINTMENT OPHTHALMIC at 09:04

## 2018-04-18 RX ADMIN — INSULIN ASPART 3 UNITS: 100 INJECTION, SOLUTION INTRAVENOUS; SUBCUTANEOUS at 04:04

## 2018-04-18 RX ADMIN — PIPERACILLIN AND TAZOBACTAM 4.5 G: 4; .5 INJECTION, POWDER, LYOPHILIZED, FOR SOLUTION INTRAVENOUS; PARENTERAL at 09:04

## 2018-04-18 RX ADMIN — ATOVAQUONE 1500 MG: 750 SUSPENSION ORAL at 10:04

## 2018-04-18 RX ADMIN — MINERAL OIL AND WHITE PETROLATUM: 150; 830 OINTMENT OPHTHALMIC at 02:04

## 2018-04-18 RX ADMIN — Medication 12.5 MG: at 08:04

## 2018-04-18 RX ADMIN — PROCHLORPERAZINE EDISYLATE 10 MG: 5 INJECTION INTRAMUSCULAR; INTRAVENOUS at 09:04

## 2018-04-18 RX ADMIN — INSULIN ASPART 4 UNITS: 100 INJECTION, SOLUTION INTRAVENOUS; SUBCUTANEOUS at 04:04

## 2018-04-18 NOTE — ASSESSMENT & PLAN NOTE
--sCr elevated but downtrending, with baseline 0.9 on admission.  --possibly ATN from hypoperfusion, bactrim and Vancomycin in conjunction with Pip/Tazo in setting of contrast exposure  --bactrim changed to atovaquone  --appropriate response to lasix challenge  --appreciate nephrology assistance  --trend BMP q8

## 2018-04-18 NOTE — ASSESSMENT & PLAN NOTE
--noted on 4/17. New this admission  --continue metoprolol for rate control.   --CHADS Vasc score is 7; will discuss anticoagulation, but likely would defer until clinical picture more stable

## 2018-04-18 NOTE — PLAN OF CARE
Problem: Patient Care Overview  Goal: Plan of Care Review  Outcome: Ongoing (interventions implemented as appropriate)  Upon shift change, pt in Afib 120-160s, BP stable. Metoprolol IV and PO given, no changes to HR. CC team notified, Amio loading dose given per order, followed by continuous drip. Pt converted to SR around 2300. All other VSS. Pt responding to voice and opening eyes on command. Pt tolerating vent settings of FiO2 40% peep 8. Hill output 50-100cc/hr. Flexi in place. Pt remains on continuous infusion of Fentanyl, precedex, and amiodarone. POC reviewed with pt and family. All questions and concerns addressed. Will continue to monitor.

## 2018-04-18 NOTE — PROGRESS NOTES
[facility]  Infectious Disease  Progress Note    Patient Name: Selma Alonzo Lux MD  MRN: [id number]  Admission Date: 4/5/2018  Length of Stay: 13 days  Attending Physician: Raffaele Almonte*  Primary Care Provider: Bhargav Hirsch MD    Isolation Status: No active isolations  Assessment/Plan:      * Acute hypoxemic respiratory failure    81yo woman w/a history of prior CVA, RA (dx 2012; RF seropositive, erosive; on HCQ/prednisone 5mg and recently started humira first dose 3/22/2018), IBS, and Hasimotos thyroiditis (goiter) who was admitted on 4/5/2018 with 2 days of dry cough and progressive SOB due to multifocal PNA (with peripheral and basilar lesions noted on CT chest) that has progressively worsened despite CAP coverage x5 days, requiring intubation on 4/12 for hypoxic RF. She remains critically ill with slow improvement on expanded coverage for all possibilities on differential of hypoxic RF (most notable for MDRO bacterial HCAP/HAP, IFI including Aspergillus and endemic fungi, and  vs hypersensitivity pneumonitis). Course c/b KERMIT (now resolved after empiric bactrim stopped with negative GMS) and AF w/RVR (converted with amiodarone). Etiology remains unknown despite her slow improvement.    - would complete 7 day zosyn course as planned for possible HAP/aspiration (given acute inflammation noted on cytology from bronch and no growth from cultures, have decided to offer empiric 7 day course since these diagnoses are difficult to exclude)   - would continue empiric posaconazole for coverage of the above mold species and await pending fungal cultures; all fungal markers are negative  - bactrim prophylaxis transitioned to atovaquone given recent KERMIT  - may continue steroids for now at discretion of CCU team as we cannot definitively exclude /HP  - await pending BAL cultures and will tailor therapy with you as she improves  - low-grade temps have persisted -- have repeated  cultures today to ensure she has note developed a new nosocomial infection               Anticipated Disposition: pending improvement    Thank you for your consult. I will follow-up with patient. Please contact us if you have any additional questions.     Carol Tan MD  Transplant ID Attending  [phone]    Carol Tan MD  Infectious Disease  [facility]    Subjective:     Principal Problem:Acute hypoxemic respiratory failure    HPI: No notes on file  Interval History: Had AF w/RVR converted to sinus with amio. Remains intubated and on 40% FiO2 with ongoing weaning of support in light of that setback. Continues to have low-grade support today.    Review of Systems   Unable to perform ROS: Intubated     Objective:     Vital Signs (Most Recent):  Temp: 98.7 °F (37.1 °C) (04/18/18 1500)  Pulse: 79 (04/18/18 1533)  Resp: (!) 33 (04/18/18 1533)  BP: (!) 148/70 (04/18/18 1500)  SpO2: (!) 94 % (04/18/18 1533) Vital Signs (24h Range):  Temp:  [98 °F (36.7 °C)-100.5 °F (38.1 °C)] 98.7 °F (37.1 °C)  Pulse:  [] 79  Resp:  [14-48] 33  SpO2:  [89 %-100 %] 94 %  BP: (100-154)/(50-79) 148/70  Arterial Line BP: ()/(36-56) 137/41     Weight: 71.5 kg (157 lb 10.1 oz)  Body mass index is 26.23 kg/m².    Estimated Creatinine Clearance: 31.8 mL/min (based on SCr of 1.4 mg/dL).    Physical Exam   Constitutional: She appears well-developed. No distress.   Intubated/sedated.   HENT:   Head: Atraumatic.   Mouth/Throat: Oropharynx is clear and moist. No oropharyngeal exudate.   Eyes: Conjunctivae and EOM are normal. Pupils are equal, round, and reactive to light. No scleral icterus.   Neck: Neck supple.   Cardiovascular: Normal rate and regular rhythm.  Exam reveals no friction rub.    No murmur heard.  Pulmonary/Chest: No respiratory distress. She has no wheezes. She has rales. She exhibits no tenderness.   On vent, 40% FiO2.   Abdominal: Soft. Bowel sounds are normal. She exhibits no distension.  There is no tenderness. There is no rebound and no guarding.   Musculoskeletal: Normal range of motion. She exhibits no edema.   Lymphadenopathy:     She has no cervical adenopathy.   Neurological:   Intubated/sedated.   Skin: No rash noted. No erythema.       Significant Labs:   CBC:     Recent Labs  Lab 04/17/18  0358 04/18/18  0406   WBC 20.79* 19.88*   HGB 9.0* 9.1*   HCT 28.6* 30.7*   * 470*     CMP:     Recent Labs  Lab 04/17/18  0358  04/17/18  1853 04/18/18  0019 04/18/18  0408 04/18/18  0750   NA  --   < > 153* 152* 153* 153*   K  --   < > 4.3 4.8 4.9 4.1   CL  --   < > 108 108 109 111*   CO2  --   < > 34* 31* 31* 32*   GLU  --   < > 161* 176* 205* 164*   BUN  --   < > 78* 76* 78* 75*   CREATININE  --   < > 1.5* 1.5* 1.5* 1.4   CALCIUM  --   < > 9.4 8.5* 8.4* 8.2*   PROT 6.4  --  6.9  --  6.4  --    ALBUMIN 1.8*  --  1.8*  --  1.7*  --    BILITOT 0.4  --  0.5  --  0.3  --    ALKPHOS 81  --  91  --  70  --    AST 15  --  16  --  13  --    ALT 17  --  15  --  14  --    ANIONGAP  --   < > 11 13 13 10   EGFRNONAA  --   < > 32.7* 32.7* 32.7* 35.5*   < > = values in this interval not displayed.    Significant Imaging: I have reviewed all pertinent imaging results/findings within the past 24 hours.     CT chest:  1. Progression of bilateral patchy and confluent pulmonary consolidation compatible with pneumonia less likely edema or noninfectious inflammation.  There is air noted throughout the esophagus.  2. Stable markedly enlarged right thyroid lobe with large hypodense nodule measuring 3.2 x 2.4 x 4.7 cm.  3. Right renal cyst.  4. Enlarged lobular uterus with calcifications compatible with fibroids.  5. Additional findings as above.     Microbiology:  4/5 blood cx negative  4/5 urine cx negative  4/10 C.diff negative  4/11 blood cx negative  4/11 BAL cx NGTD, cytology with acute inflammation; GMS negative  4/11 aspergillus antigen negative  4/11 fungitell negative  4/11 urine histo negative  4/11 urine  blasto negative  4/11 serum crypto negative  4/11 RVP pending  4/11 urine legionella negative

## 2018-04-18 NOTE — PLAN OF CARE
Problem: Patient Care Overview  Goal: Plan of Care Review  Outcome: Ongoing (interventions implemented as appropriate)  POC:  Pt remains ventilated at Fio2 40%, Peep 5.   Sedation weaned this shift. Fentanyl stopped and Precedex continued a 0.3 mcg. Pt has started to wake up a little more. She withdraws to pain, intermittently moves UE's, Opens eyes to voice, and cough, gag, corneal reflexes intact. RASS -2 /-3.  Pt remains on Amiodarone continuous infusion but will be stopped at 1800 today via NP order. Pt remains on ABX and Precedex @ 0.3 mcg.  Pt remains on tube feeds at goal of 40 ml/hr.  ID physician to bedside this shift and due to pt's spike in temp Blood Cultures, UA, and resp culture ordered and sent.   Pt's daughter and sister at bedside this shift. All questions answered and concerns addressed. Monitoring closely.

## 2018-04-18 NOTE — PLAN OF CARE
Problem: Patient Care Overview  Goal: Plan of Care Review  Outcome: Ongoing (interventions implemented as appropriate)  Pt remains in CMICU 3081. Pt went into A fib RVR, see previous note. Pt opening eyes but not following commands. Tmax 100.  Agitated today, precedex gtt and fentanyl gtt restarted at 50 mcg. Hill and flexi in place, output per chart. Lasix x 1 dose administered. Failed SBT. Vitals and assessments per chart. POC reviewed with family at bedside.

## 2018-04-18 NOTE — SUBJECTIVE & OBJECTIVE
Interval History/Significant Events: At about 1800, she went into A-fib with RVR. She was bolused with 500cc Lr, give IV metoprolol and eventually started on an amiodarone gtt.     Review of Systems   Unable to perform ROS: Intubated     Objective:     Vital Signs (Most Recent):  Temp: (!) 100.4 °F (38 °C) (04/18/18 0700)  Pulse: 80 (04/18/18 0800)  Resp: (!) 31 (04/18/18 0800)  BP: (!) 115/56 (04/18/18 0800)  SpO2: (!) 92 % (04/18/18 0800) Vital Signs (24h Range):  Temp:  [98 °F (36.7 °C)-100.4 °F (38 °C)] 100.4 °F (38 °C)  Pulse:  [] 80  Resp:  [13-49] 31  SpO2:  [91 %-100 %] 92 %  BP: (100-214)/(50-87) 115/56  Arterial Line BP: ()/(36-67) 112/53   Weight: 71.5 kg (157 lb 10.1 oz)  Body mass index is 26.23 kg/m².      Intake/Output Summary (Last 24 hours) at 04/18/18 0838  Last data filed at 04/18/18 0800   Gross per 24 hour   Intake          2606.44 ml   Output             2945 ml   Net          -338.56 ml       Physical Exam   Constitutional: She appears well-developed. She does not have a sickly appearance. She is sedated and intubated.   HENT:   Head: Normocephalic and atraumatic.   Eyes: Conjunctivae and EOM are normal. Pupils are equal, round, and reactive to light. Right eye exhibits no exudate. Left eye exhibits no exudate. Right conjunctiva has no hemorrhage. Left conjunctiva has no hemorrhage. No scleral icterus. Right eye exhibits no nystagmus. Left eye exhibits no nystagmus.   Pupils 1mm, and sluggish reaction to light   Neck: Trachea normal. No neck rigidity. No tracheal deviation present.       Cardiovascular: Regular rhythm and normal heart sounds.  Frequent extrasystoles are present. PMI is not displaced.  Exam reveals no gallop and no friction rub.    No murmur heard.  Pulses:       Radial pulses are 2+ on the right side, and 2+ on the left side.        Dorsalis pedis pulses are 2+ on the right side, and 2+ on the left side.        Posterior tibial pulses are 2+ on the right side, and  2+ on the left side.   Pulmonary/Chest: Effort normal and breath sounds normal. Tachypnea noted. She is intubated. She has no wheezes. She has no rhonchi. She has no rales.   Breath sounds coarse bilaterally   Abdominal: Soft. Normal appearance and bowel sounds are normal. There is no tenderness.   Genitourinary:   Genitourinary Comments: Hill draining clear, yellow urine   Musculoskeletal: Normal range of motion.   Neurological: She has normal strength. GCS eye subscore is 1. GCS verbal subscore is 1. GCS motor subscore is 4.   Skin: Skin is warm. She is diaphoretic. No cyanosis. Nails show no clubbing.   Nursing note and vitals reviewed.      Vents:  Vent Mode: A/C (04/18/18 0732)  Set Rate: 16 bmp (04/18/18 0732)  Vt Set: 375 mL (04/18/18 0732)  Pressure Support: 0 cmH20 (04/18/18 0732)  PEEP/CPAP: 8 cmH20 (04/18/18 0732)  Oxygen Concentration (%): 40 (04/18/18 0800)  Peak Airway Pressure: 27 cmH2O (04/18/18 0732)  Plateau Pressure: 29 cmH20 (04/18/18 0732)  Total Ve: 14 mL (04/18/18 0732)  F/VT Ratio<105 (RSBI): (!) 87.72 (04/18/18 0732)  Lines/Drains/Airways     Central Venous Catheter Line                 Percutaneous Central Line Insertion/Assessment - triple lumen  04/11/18 1513 right subclavian 6 days          Drain                 NG/OG Tube 04/11/18 1340 Left mouth 6 days         Urethral Catheter 04/11/18 1430 Double-lumen 16 Fr. 6 days         Rectal Tube 04/16/18 0600 rectal tube w/ balloon (indicate number of mLs) 2 days          Airway                 Airway - Non-Surgical 04/11/18 1337 Endotracheal Tube 6 days          Arterial Line                 Arterial Line 04/11/18 1540 Right Radial 6 days              Significant Labs:    CBC/Anemia Profile:    Recent Labs  Lab 04/17/18  0358 04/18/18  0406   WBC 20.79* 19.88*   HGB 9.0* 9.1*   HCT 28.6* 30.7*   * 470*   MCV 76* 79*   RDW 17.1* 17.5*        Chemistries:    Recent Labs  Lab 04/17/18  0358  04/17/18  1853 04/18/18  0019 04/18/18  0408    NA  --   < > 153* 152* 153*   K  --   < > 4.3 4.8 4.9   CL  --   < > 108 108 109   CO2  --   < > 34* 31* 31*   BUN  --   < > 78* 76* 78*   CREATININE  --   < > 1.5* 1.5* 1.5*   CALCIUM  --   < > 9.4 8.5* 8.4*   ALBUMIN 1.8*  --  1.8*  --  1.7*   PROT 6.4  --  6.9  --  6.4   BILITOT 0.4  --  0.5  --  0.3   ALKPHOS 81  --  91  --  70   ALT 17  --  15  --  14   AST 15  --  16  --  13   MG 2.3  --   --   --  2.3   PHOS 3.7  --   --   --  3.0   < > = values in this interval not displayed.    All pertinent labs within the past 24 hours have been reviewed.    Significant Imaging:  I have reviewed all pertinent imaging results/findings within the past 24 hours.

## 2018-04-18 NOTE — ASSESSMENT & PLAN NOTE
--Suspect secondary to severe bilateral pneumonia in the setting of immunosuppression; possibly  vs reaction from Humira   --Severely worsened over first week of admission despite CAP treatment.  --s/p emergent intubation on 4/11 upon MICU admission for severe hypoxemia and severe respiratory distress.  --Continue mechanical ventilation, wean support as tolerated.  --Bronchoscopy on 4/11 with wash; culture with few WBCs, no organisms.  --Antibiotics broadened to vancomycin, zosyn, bactrim, and posaconazole initally; appreciate ID assistance with management. S/p week course of zosyn (stop date 4/19). Continue posiconazole for now. Atovoquone for PCP prophylaxis  --Blood, sputum, and urine cultures negative from 4/10.   --KOH prep negative, AFB stain with no acid fast bacilli noted, fungal culture pending  --Aspergillus, Histoplasma, and Cryptococcal antigen in blood is negative. Legionella and blasomyces negative.   --Continue IV steroids for possibility of

## 2018-04-18 NOTE — ASSESSMENT & PLAN NOTE
--appreciate nutrition assistance.  --tolerating enteral feedings at goal 40 cc/hr  --continue enteral water boluses

## 2018-04-18 NOTE — ASSESSMENT & PLAN NOTE
--suspect secondary to sedatives for ventilator dyssynchrony and critical illness.    --stop precedex for daily SAT's  --minimize sedation, opiates, deliriogenic medications

## 2018-04-18 NOTE — ASSESSMENT & PLAN NOTE
81yo woman w/a history of prior CVA, RA (dx 2012; RF seropositive, erosive; on HCQ/prednisone 5mg and recently started humira first dose 3/22/2018), IBS, and Hasimotos thyroiditis (goiter) who was admitted on 4/5/2018 with 2 days of dry cough and progressive SOB due to multifocal PNA (with peripheral and basilar lesions noted on CT chest) that has progressively worsened despite CAP coverage x5 days, requiring intubation on 4/12 for hypoxic RF. She remains critically ill with slow improvement on expanded coverage for all possibilities on differential of hypoxic RF (most notable for MDRO bacterial HCAP/HAP, IFI including Aspergillus and endemic fungi, and  vs hypersensitivity pneumonitis). Course c/b KERMIT (now resolved after empiric bactrim stopped with negative GMS) and AF w/RVR (converted with amiodarone). Etiology remains unknown despite her slow improvement.    - would complete 7 day zosyn course as planned for possible HAP/aspiration (given acute inflammation noted on cytology from bronch and no growth from cultures, have decided to offer empiric 7 day course since these diagnoses are difficult to exclude)   - would continue empiric posaconazole for coverage of the above mold species and await pending fungal cultures; all fungal markers are negative  - bactrim prophylaxis transitioned to atovaquone given recent KERMIT  - may continue steroids for now at discretion of CCU team as we cannot definitively exclude /HP  - await pending BAL cultures and will tailor therapy with you as she improves  - low-grade temps have persisted -- have repeated cultures today to ensure she has note developed a new nosocomial infection

## 2018-04-18 NOTE — SUBJECTIVE & OBJECTIVE
Interval History: Had AF w/RVR converted to sinus with amio. Remains intubated and on 40% FiO2 with ongoing weaning of support in light of that setback. Continues to have low-grade support today.    Review of Systems   Unable to perform ROS: Intubated     Objective:     Vital Signs (Most Recent):  Temp: 98.7 °F (37.1 °C) (04/18/18 1500)  Pulse: 79 (04/18/18 1533)  Resp: (!) 33 (04/18/18 1533)  BP: (!) 148/70 (04/18/18 1500)  SpO2: (!) 94 % (04/18/18 1533) Vital Signs (24h Range):  Temp:  [98 °F (36.7 °C)-100.5 °F (38.1 °C)] 98.7 °F (37.1 °C)  Pulse:  [] 79  Resp:  [14-48] 33  SpO2:  [89 %-100 %] 94 %  BP: (100-154)/(50-79) 148/70  Arterial Line BP: ()/(36-56) 137/41     Weight: 71.5 kg (157 lb 10.1 oz)  Body mass index is 26.23 kg/m².    Estimated Creatinine Clearance: 31.8 mL/min (based on SCr of 1.4 mg/dL).    Physical Exam   Constitutional: She appears well-developed. No distress.   Intubated/sedated.   HENT:   Head: Atraumatic.   Mouth/Throat: Oropharynx is clear and moist. No oropharyngeal exudate.   Eyes: Conjunctivae and EOM are normal. Pupils are equal, round, and reactive to light. No scleral icterus.   Neck: Neck supple.   Cardiovascular: Normal rate and regular rhythm.  Exam reveals no friction rub.    No murmur heard.  Pulmonary/Chest: No respiratory distress. She has no wheezes. She has rales. She exhibits no tenderness.   On vent, 40% FiO2.   Abdominal: Soft. Bowel sounds are normal. She exhibits no distension. There is no tenderness. There is no rebound and no guarding.   Musculoskeletal: Normal range of motion. She exhibits no edema.   Lymphadenopathy:     She has no cervical adenopathy.   Neurological:   Intubated/sedated.   Skin: No rash noted. No erythema.       Significant Labs:   CBC:     Recent Labs  Lab 04/17/18  0358 04/18/18  0406   WBC 20.79* 19.88*   HGB 9.0* 9.1*   HCT 28.6* 30.7*   * 470*     CMP:     Recent Labs  Lab 04/17/18  0358  04/17/18  1853 04/18/18  0019  04/18/18  0408 04/18/18  0750   NA  --   < > 153* 152* 153* 153*   K  --   < > 4.3 4.8 4.9 4.1   CL  --   < > 108 108 109 111*   CO2  --   < > 34* 31* 31* 32*   GLU  --   < > 161* 176* 205* 164*   BUN  --   < > 78* 76* 78* 75*   CREATININE  --   < > 1.5* 1.5* 1.5* 1.4   CALCIUM  --   < > 9.4 8.5* 8.4* 8.2*   PROT 6.4  --  6.9  --  6.4  --    ALBUMIN 1.8*  --  1.8*  --  1.7*  --    BILITOT 0.4  --  0.5  --  0.3  --    ALKPHOS 81  --  91  --  70  --    AST 15  --  16  --  13  --    ALT 17  --  15  --  14  --    ANIONGAP  --   < > 11 13 13 10   EGFRNONAA  --   < > 32.7* 32.7* 32.7* 35.5*   < > = values in this interval not displayed.    Significant Imaging: I have reviewed all pertinent imaging results/findings within the past 24 hours.     CT chest:  1. Progression of bilateral patchy and confluent pulmonary consolidation compatible with pneumonia less likely edema or noninfectious inflammation.  There is air noted throughout the esophagus.  2. Stable markedly enlarged right thyroid lobe with large hypodense nodule measuring 3.2 x 2.4 x 4.7 cm.  3. Right renal cyst.  4. Enlarged lobular uterus with calcifications compatible with fibroids.  5. Additional findings as above.     Microbiology:  4/5 blood cx negative  4/5 urine cx negative  4/10 C.diff negative  4/11 blood cx negative  4/11 BAL cx NGTD, cytology with acute inflammation; GMS negative  4/11 aspergillus antigen negative  4/11 fungitell negative  4/11 urine histo negative  4/11 urine blasto negative  4/11 serum crypto negative  4/11 RVP pending  4/11 urine legionella negative

## 2018-04-18 NOTE — PROGRESS NOTES
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 She was seen by her PCP on 4/2 for these complaints and received a prescription for duo nebs and tessalon perles. CXR performed on 4/2 noted bilateral basilar infiltrates. At that time, she denied fever, chills, sputum production, sick contacts. She was admitted to  and treated emprically for CAP with rocephin and azithromycin. She was also diuresed daily. However, her oxygen requirements slowly increased from 2 L NC to 4 L NC with also progressively worsening bilateral infiltrates on imaging. CTA performed on 4/9 negative for PE, however noted significant bilateral consolidations. On 4/11, her hypoxemia continued to worsen, requiring NRB mask, and she developed severe respiratory distress. She was transferred to the MICU and emergently intubated. Repeat CT chest performed on 4/11 demonstrated progressive worsening of bilateral peripheral infiltrates.    Hospital/ICU Course:  Admitted to MICU on 4/11, intubated emergently for severe hypoxemia and respiratory distress. ID was consulted for assistance in management given immunocompromised state. Patient paralyzed with nimbex following persistent dysynchrony with vent despite sedation. Abx broadened to vancomycin, zosyn, bactrim, and posaconazole. Norepinephrine initiated for BP support.  Bronchoscopy performed at bedside on 4/11.   Nimbex discontinued on 4/12. She continues to require significant oxygen support, and unable to wean oxygen requirements significantly since 4/12. On 4/17, she went into A-fib RVR in 120-160's started on an Amiodarone gtt.    Interval History/Significant Events: At about 1800, she went into A-fib with RVR. She was bolused with 500cc Lr, give IV metoprolol and eventually started on an amiodarone gtt.     Review of Systems   Unable to perform ROS: Intubated     Objective:     Vital Signs (Most Recent):  Temp: (!) 100.4 °F (38 °C) (04/18/18 0700)  Pulse: 80 (04/18/18 0800)  Resp: (!) 31 (04/18/18 0800)  BP: (!) 115/56 (04/18/18 0800)  SpO2: (!) 92 % (04/18/18 0800) Vital Signs (24h Range):  Temp:  [98 °F (36.7 °C)-100.4 °F (38 °C)] 100.4 °F (38 °C)  Pulse:  [] 80  Resp:  [13-49] 31  SpO2:  [91 %-100 %] 92 %  BP: (100-214)/(50-87) 115/56  Arterial Line BP: ()/(36-67) 112/53   Weight: 71.5 kg (157 lb 10.1 oz)  Body mass index is 26.23 kg/m².      Intake/Output Summary (Last 24 hours) at 04/18/18 0838  Last data filed at 04/18/18 0800   Gross per 24 hour   Intake          2606.44 ml   Output             2945 ml   Net          -338.56 ml       Physical Exam   Constitutional: She appears well-developed. She does not have a sickly appearance. She is sedated and intubated.   HENT:   Head: Normocephalic and atraumatic.   Eyes: Conjunctivae and EOM are normal. Pupils are equal, round, and reactive to light. Right eye exhibits no exudate. Left eye exhibits no exudate. Right conjunctiva has no hemorrhage. Left conjunctiva has no hemorrhage. No scleral icterus. Right eye exhibits no nystagmus. Left eye exhibits no nystagmus.   Pupils 1mm, and sluggish reaction to light   Neck: Trachea normal. No neck rigidity. No tracheal deviation present.       Cardiovascular: Regular rhythm and normal heart sounds.  Frequent extrasystoles are present. PMI is not displaced.  Exam reveals  no gallop and no friction rub.    No murmur heard.  Pulses:       Radial pulses are 2+ on the right side, and 2+ on the left side.        Dorsalis pedis pulses are 2+ on the right side, and 2+ on the left side.        Posterior tibial pulses are 2+ on the right side, and 2+ on the left side.   Pulmonary/Chest: Effort normal and breath sounds normal. Tachypnea noted. She is intubated. She has no wheezes. She has no rhonchi. She has no rales.   Breath sounds coarse bilaterally   Abdominal: Soft. Normal appearance and bowel sounds are normal. There is no tenderness.   Genitourinary:   Genitourinary Comments: Hill draining clear, yellow urine   Musculoskeletal: Normal range of motion.   Neurological: She has normal strength. GCS eye subscore is 1. GCS verbal subscore is 1. GCS motor subscore is 4.   Skin: Skin is warm. She is diaphoretic. No cyanosis. Nails show no clubbing.   Nursing note and vitals reviewed.      Vents:  Vent Mode: A/C (04/18/18 0732)  Set Rate: 16 bmp (04/18/18 0732)  Vt Set: 375 mL (04/18/18 0732)  Pressure Support: 0 cmH20 (04/18/18 0732)  PEEP/CPAP: 8 cmH20 (04/18/18 0732)  Oxygen Concentration (%): 40 (04/18/18 0800)  Peak Airway Pressure: 27 cmH2O (04/18/18 0732)  Plateau Pressure: 29 cmH20 (04/18/18 0732)  Total Ve: 14 mL (04/18/18 0732)  F/VT Ratio<105 (RSBI): (!) 87.72 (04/18/18 0732)  Lines/Drains/Airways     Central Venous Catheter Line                 Percutaneous Central Line Insertion/Assessment - triple lumen  04/11/18 1513 right subclavian 6 days          Drain                 NG/OG Tube 04/11/18 1340 Left mouth 6 days         Urethral Catheter 04/11/18 1430 Double-lumen 16 Fr. 6 days         Rectal Tube 04/16/18 0600 rectal tube w/ balloon (indicate number of mLs) 2 days          Airway                 Airway - Non-Surgical 04/11/18 1337 Endotracheal Tube 6 days          Arterial Line                 Arterial Line 04/11/18 1540 Right Radial 6 days              Significant  Labs:    CBC/Anemia Profile:    Recent Labs  Lab 04/17/18  0358 04/18/18  0406   WBC 20.79* 19.88*   HGB 9.0* 9.1*   HCT 28.6* 30.7*   * 470*   MCV 76* 79*   RDW 17.1* 17.5*        Chemistries:    Recent Labs  Lab 04/17/18  0358  04/17/18  1853 04/18/18  0019 04/18/18  0408   NA  --   < > 153* 152* 153*   K  --   < > 4.3 4.8 4.9   CL  --   < > 108 108 109   CO2  --   < > 34* 31* 31*   BUN  --   < > 78* 76* 78*   CREATININE  --   < > 1.5* 1.5* 1.5*   CALCIUM  --   < > 9.4 8.5* 8.4*   ALBUMIN 1.8*  --  1.8*  --  1.7*   PROT 6.4  --  6.9  --  6.4   BILITOT 0.4  --  0.5  --  0.3   ALKPHOS 81  --  91  --  70   ALT 17  --  15  --  14   AST 15  --  16  --  13   MG 2.3  --   --   --  2.3   PHOS 3.7  --   --   --  3.0   < > = values in this interval not displayed.    All pertinent labs within the past 24 hours have been reviewed.    Significant Imaging:  I have reviewed all pertinent imaging results/findings within the past 24 hours.    Assessment/Plan:     Neuro   Encephalopathy, metabolic    --suspect secondary to sedatives for ventilator dyssynchrony and critical illness.    --off sedation since 12:30 4/16          Cerebrovascular accident (CVA)    --Remote lacunar infarcts in the right centrum semi-ovale, basal ganglia, and right tatyana per CTH w/o contrast on 12/2017.  --Continue home clopidogrel.        Pulmonary   * Acute hypoxemic respiratory failure    --Suspect secondary to severe bilateral pneumonia in the setting of immunosuppression; possibly  vs reaction from Humira   --Severely worsened over first week of admission despite CAP treatment.  --s/p emergent intubation on 4/11 upon MICU admission for severe hypoxemia and severe respiratory distress.  --Continue mechanical ventilation, wean support as tolerated.  --Bronchoscopy on 4/11 with wash; culture with few WBCs, no organisms.  --Antibiotics broadened to vancomycin, zosyn, bactrim, and posaconazole initally; appreciate ID assistance with management.  "Will continue zosyn, posiconazole for now; change bactrim to atovoquone for PCP prophylaxis given KERMIT  --Blood, sputum, and urine cultures negative from 4/10.   --KOH prep negative, AFB stain with no acid fast bacilli noted, fungal culture pending  --Aspergillus, Histoplasma, and Cryptococcal antigen in blood is negative. Legionella and blasomyces negative.   --Cytology and viral panel pending.  --Continue antibiotics and IV steroids. Preliminary GMS stain negative, Bactrim (treatment dose) discontinued 4/13.  Remains on prophylaxis dose of atovaquone now   --P/F ratio remains around 100; Low peep as she has not been peep responsive.   --CXR with mild improvement however continues to require significant oxygen.  --continue lasix as tolerated           ARDS (adult respiratory distress syndrome)    - See "Acute hypoxemic respiratory failure".        Pneumonia of both upper lobes due to infectious organism    --see above        Cardiac/Vascular   Atrial fibrillation with RVR    --possibly secondary to diuresis  --IV metoprolol given  --Amiodarone bolus followed by infusion initiated  --EKG prn for rhythm changes        Hypertension, essential    --continue to hold htn meds        Renal/   Metabolic alkalosis    --slowly improving  --ABG q6        Hypernatremia    --increase free water boluses to 200cc q6  --BMP q8        KERMIT (acute kidney injury)    --sCr elevated but downtrending, with baseline 0.9 on admission.  --possibly ATN from hypoperfusion, bactrim and Vancomycin in conjunction with Pip/Tazo in setting of contrast exposure  --bactrim changed to atovaquone  --appropriate response to lasix challenge  --appreciate nephrology assistance  --trend BMP q8          Oncology   Leukocytosis    --continue antibiotic course  --ID following        Endocrine   On tube feeding diet    --appreciate nutrition assistance.  --tolerating enteral feedings at goal 40 cc/hr  --continue enteral water boluses        Hyperglycemia    " --Likely steroid-induced.  --transitioned from gtt to SQ insulin.  --Goal -180.        Thyroid enlargement    --History of Hashimoto's thyroiditis.  --Intrathoracic extension on CT.  --TSH and free T4 wnl.          GI   PUD (peptic ulcer disease)    --Continue PPI.        Orthopedic   Seropositive rheumatoid arthritis of multiple sites    --Appreciate Rheumatology assistance.  --Continue hydroxychloroquine.  --Hold home prednisone; switched to IV SoluMedrol 80mg Q12h.        Other   Long term (current) use of systemic steroids    - See above           Critical Care Daily Checklist:    A: Awake: RASS Goal/Actual Goal: RASS Goal: 0-->alert and calm  Actual: Monzon Agitation Sedation Scale (RASS): Deep sedation   B: Spontaneous Breathing Trial Performed?     C: SAT & SBT Coordinated?  yes                      D: Delirium: CAM-ICU Overall CAM-ICU: Positive   E: Early Mobility Performed? No   F: Feeding Goal: Goals: Pt to receive >85% EEN and EPN  Status: Nutrition Goal Status: goal met   Current Diet Order   Procedures    Diet NPO      AS: Analgesia/Sedation precedex   T: Thromboembolic Prophylaxis heparin   H: HOB > 300 Yes   U: Stress Ulcer Prophylaxis (if needed) ppi   G: Glucose Control ssi   B: Bowel Function Stool Occurrence: 1   I: Indwelling Catheter (Lines & Parikh) Necessity parikh   D: De-escalation of Antimicrobials/Pharmacotherapies n/a    Plan for the day/ETD sbt    Code Status:  Family/Goals of Care: Full Code  sbt     Critical Care Time: 55 minutes  Critical secondary to Patient has a condition that poses threat to life and bodily function: acute hypoxic respiratory failure      Critical care was time spent personally by me on the following activities: development of treatment plan with patient or surrogate and bedside caregivers, discussions with consultants, evaluation of patient's response to treatment, examination of patient, ordering and performing treatments and interventions, ordering and  review of laboratory studies, ordering and review of radiographic studies, pulse oximetry, re-evaluation of patient's condition. This critical care time did not overlap with that of any other provider or involve time for any procedures.     Caio Nevarez NP  Critical Care Medicine  [facility]

## 2018-04-18 NOTE — ASSESSMENT & PLAN NOTE
--possibly secondary to diuresis  --IV metoprolol given  --Amiodarone bolus followed by infusion initiated  --EKG prn for rhythm changes

## 2018-04-18 NOTE — ASSESSMENT & PLAN NOTE
--possibly ATN from hypoperfusion, bactrim and Vancomycin in conjunction with Pip/Tazo in setting of contrast exposure  --bactrim changed to atovaquone  --lasix challenge on 4/17 with good urine output  --neprhology following, appreciate recs  --trend BMP, intake output. Clinically improving

## 2018-04-19 PROBLEM — Z78.9 ON TUBE FEEDING DIET: Status: RESOLVED | Noted: 2018-04-13 | Resolved: 2018-04-19

## 2018-04-19 PROBLEM — D72.829 LEUKOCYTOSIS: Status: RESOLVED | Noted: 2018-04-18 | Resolved: 2018-04-19

## 2018-04-19 LAB
ALBUMIN SERPL BCP-MCNC: 1.8 G/DL
ALBUMIN SERPL BCP-MCNC: 1.8 G/DL
ALLENS TEST: ABNORMAL
ALP SERPL-CCNC: 63 U/L
ALP SERPL-CCNC: 66 U/L
ALT SERPL W/O P-5'-P-CCNC: 13 U/L
ALT SERPL W/O P-5'-P-CCNC: 14 U/L
ANION GAP SERPL CALC-SCNC: 8 MMOL/L
ANION GAP SERPL CALC-SCNC: 8 MMOL/L
ANION GAP SERPL CALC-SCNC: 9 MMOL/L
ANISOCYTOSIS BLD QL SMEAR: SLIGHT
AST SERPL-CCNC: 17 U/L
AST SERPL-CCNC: 17 U/L
BASOPHILS # BLD AUTO: 0.02 K/UL
BASOPHILS NFR BLD: 0.1 %
BILIRUB DIRECT SERPL-MCNC: 0.2 MG/DL
BILIRUB SERPL-MCNC: 0.5 MG/DL
BILIRUB SERPL-MCNC: 0.5 MG/DL
BUN SERPL-MCNC: 53 MG/DL
BUN SERPL-MCNC: 60 MG/DL
BUN SERPL-MCNC: 68 MG/DL
CALCIUM SERPL-MCNC: 6.6 MG/DL
CALCIUM SERPL-MCNC: 8.4 MG/DL
CALCIUM SERPL-MCNC: 8.5 MG/DL
CHLORIDE SERPL-SCNC: 111 MMOL/L
CHLORIDE SERPL-SCNC: 113 MMOL/L
CHLORIDE SERPL-SCNC: 120 MMOL/L
CO2 SERPL-SCNC: 26 MMOL/L
CO2 SERPL-SCNC: 32 MMOL/L
CO2 SERPL-SCNC: 33 MMOL/L
CREAT SERPL-MCNC: 0.9 MG/DL
CREAT SERPL-MCNC: 1.2 MG/DL
CREAT SERPL-MCNC: 1.2 MG/DL
DELSYS: ABNORMAL
DIFFERENTIAL METHOD: ABNORMAL
EOSINOPHIL # BLD AUTO: 0 K/UL
EOSINOPHIL NFR BLD: 0 %
ERYTHROCYTE [DISTWIDTH] IN BLOOD BY AUTOMATED COUNT: 17.9 %
ERYTHROCYTE [SEDIMENTATION RATE] IN BLOOD BY WESTERGREN METHOD: 16 MM/H
EST. GFR  (AFRICAN AMERICAN): 49.3 ML/MIN/1.73 M^2
EST. GFR  (AFRICAN AMERICAN): 49.3 ML/MIN/1.73 M^2
EST. GFR  (AFRICAN AMERICAN): >60 ML/MIN/1.73 M^2
EST. GFR  (NON AFRICAN AMERICAN): 42.8 ML/MIN/1.73 M^2
EST. GFR  (NON AFRICAN AMERICAN): 42.8 ML/MIN/1.73 M^2
EST. GFR  (NON AFRICAN AMERICAN): >60 ML/MIN/1.73 M^2
FIO2: 40
GIANT PLATELETS BLD QL SMEAR: PRESENT
GLUCOSE SERPL-MCNC: 133 MG/DL
GLUCOSE SERPL-MCNC: 144 MG/DL
GLUCOSE SERPL-MCNC: 170 MG/DL
HCO3 UR-SCNC: 38.1 MMOL/L (ref 24–28)
HCT VFR BLD AUTO: 29.5 %
HGB BLD-MCNC: 9.1 G/DL
HYPOCHROMIA BLD QL SMEAR: ABNORMAL
IMM GRANULOCYTES # BLD AUTO: 0.12 K/UL
IMM GRANULOCYTES NFR BLD AUTO: 0.6 %
INR PPP: 1.3
LYMPHOCYTES # BLD AUTO: 0.5 K/UL
LYMPHOCYTES NFR BLD: 2.2 %
MAGNESIUM SERPL-MCNC: 2.6 MG/DL
MCH RBC QN AUTO: 24.5 PG
MCHC RBC AUTO-ENTMCNC: 30.8 G/DL
MCV RBC AUTO: 79 FL
MODE: ABNORMAL
MONOCYTES # BLD AUTO: 0.7 K/UL
MONOCYTES NFR BLD: 3.4 %
NEUTROPHILS # BLD AUTO: 20.2 K/UL
NEUTROPHILS NFR BLD: 93.7 %
NRBC BLD-RTO: 0 /100 WBC
PCO2 BLDA: 38.1 MMHG (ref 35–45)
PEEP: 5
PH SMN: 7.61 [PH] (ref 7.35–7.45)
PHOSPHATE SERPL-MCNC: 3 MG/DL
PLATELET # BLD AUTO: 415 K/UL
PLATELET BLD QL SMEAR: ABNORMAL
PMV BLD AUTO: 10.9 FL
PO2 BLDA: 70 MMHG (ref 80–100)
POC BE: 17 MMOL/L
POC SATURATED O2: 96 % (ref 95–100)
POC TCO2: 39 MMOL/L (ref 23–27)
POCT GLUCOSE: 111 MG/DL (ref 70–110)
POCT GLUCOSE: 124 MG/DL (ref 70–110)
POCT GLUCOSE: 125 MG/DL (ref 70–110)
POCT GLUCOSE: 127 MG/DL (ref 70–110)
POCT GLUCOSE: 168 MG/DL (ref 70–110)
POCT GLUCOSE: 169 MG/DL (ref 70–110)
POCT GLUCOSE: 170 MG/DL (ref 70–110)
POIKILOCYTOSIS BLD QL SMEAR: SLIGHT
POLYCHROMASIA BLD QL SMEAR: ABNORMAL
POTASSIUM SERPL-SCNC: 3.2 MMOL/L
POTASSIUM SERPL-SCNC: 3.5 MMOL/L
POTASSIUM SERPL-SCNC: 4.3 MMOL/L
PROT SERPL-MCNC: 6.4 G/DL
PROT SERPL-MCNC: 6.5 G/DL
PROTHROMBIN TIME: 13.3 SEC
RBC # BLD AUTO: 3.72 M/UL
SAMPLE: ABNORMAL
SITE: ABNORMAL
SODIUM SERPL-SCNC: 152 MMOL/L
SODIUM SERPL-SCNC: 154 MMOL/L
SODIUM SERPL-SCNC: 154 MMOL/L
SP02: 95
TARGETS BLD QL SMEAR: ABNORMAL
VT: 375
WBC # BLD AUTO: 21.52 K/UL

## 2018-04-19 PROCEDURE — 63600175 PHARM REV CODE 636 W HCPCS: Performed by: STUDENT IN AN ORGANIZED HEALTH CARE EDUCATION/TRAINING PROGRAM

## 2018-04-19 PROCEDURE — 99291 CRITICAL CARE FIRST HOUR: CPT | Mod: ,,, | Performed by: NURSE PRACTITIONER

## 2018-04-19 PROCEDURE — 63600175 PHARM REV CODE 636 W HCPCS: Performed by: NURSE PRACTITIONER

## 2018-04-19 PROCEDURE — 94003 VENT MGMT INPAT SUBQ DAY: CPT

## 2018-04-19 PROCEDURE — 80048 BASIC METABOLIC PNL TOTAL CA: CPT | Mod: 91

## 2018-04-19 PROCEDURE — 83735 ASSAY OF MAGNESIUM: CPT

## 2018-04-19 PROCEDURE — 80076 HEPATIC FUNCTION PANEL: CPT

## 2018-04-19 PROCEDURE — 80053 COMPREHEN METABOLIC PANEL: CPT

## 2018-04-19 PROCEDURE — 99233 SBSQ HOSP IP/OBS HIGH 50: CPT | Mod: GC,ICN,, | Performed by: INTERNAL MEDICINE

## 2018-04-19 PROCEDURE — 99900026 HC AIRWAY MAINTENANCE (STAT)

## 2018-04-19 PROCEDURE — 80048 BASIC METABOLIC PNL TOTAL CA: CPT

## 2018-04-19 PROCEDURE — 25000003 PHARM REV CODE 250: Performed by: STUDENT IN AN ORGANIZED HEALTH CARE EDUCATION/TRAINING PROGRAM

## 2018-04-19 PROCEDURE — 94640 AIRWAY INHALATION TREATMENT: CPT

## 2018-04-19 PROCEDURE — 82803 BLOOD GASES ANY COMBINATION: CPT

## 2018-04-19 PROCEDURE — 25000003 PHARM REV CODE 250: Performed by: NURSE PRACTITIONER

## 2018-04-19 PROCEDURE — 20000000 HC ICU ROOM

## 2018-04-19 PROCEDURE — 85025 COMPLETE CBC W/AUTO DIFF WBC: CPT

## 2018-04-19 PROCEDURE — 37799 UNLISTED PX VASCULAR SURGERY: CPT

## 2018-04-19 PROCEDURE — 94761 N-INVAS EAR/PLS OXIMETRY MLT: CPT

## 2018-04-19 PROCEDURE — 84100 ASSAY OF PHOSPHORUS: CPT

## 2018-04-19 PROCEDURE — 25000242 PHARM REV CODE 250 ALT 637 W/ HCPCS: Performed by: STUDENT IN AN ORGANIZED HEALTH CARE EDUCATION/TRAINING PROGRAM

## 2018-04-19 PROCEDURE — 25000003 PHARM REV CODE 250: Performed by: INTERNAL MEDICINE

## 2018-04-19 PROCEDURE — 99900035 HC TECH TIME PER 15 MIN (STAT)

## 2018-04-19 PROCEDURE — 27200966 HC CLOSED SUCTION SYSTEM

## 2018-04-19 PROCEDURE — 25000242 PHARM REV CODE 250 ALT 637 W/ HCPCS: Performed by: INTERNAL MEDICINE

## 2018-04-19 PROCEDURE — 85610 PROTHROMBIN TIME: CPT

## 2018-04-19 RX ORDER — FENTANYL CITRATE 50 UG/ML
25 INJECTION, SOLUTION INTRAMUSCULAR; INTRAVENOUS EVERY 4 HOURS PRN
Status: DISCONTINUED | OUTPATIENT
Start: 2018-04-19 | End: 2018-04-23

## 2018-04-19 RX ORDER — HYDRALAZINE HYDROCHLORIDE 20 MG/ML
10 INJECTION INTRAMUSCULAR; INTRAVENOUS EVERY 8 HOURS PRN
Status: DISCONTINUED | OUTPATIENT
Start: 2018-04-19 | End: 2018-04-25

## 2018-04-19 RX ORDER — INSULIN ASPART 100 [IU]/ML
3 INJECTION, SOLUTION INTRAVENOUS; SUBCUTANEOUS
Status: DISCONTINUED | OUTPATIENT
Start: 2018-04-20 | End: 2018-04-22

## 2018-04-19 RX ORDER — FENTANYL CITRATE-0.9 % NACL/PF 10 MCG/ML
25 PLASTIC BAG, INJECTION (ML) INTRAVENOUS CONTINUOUS
Status: DISPENSED | OUTPATIENT
Start: 2018-04-19 | End: 2018-04-20

## 2018-04-19 RX ORDER — SODIUM CHLORIDE FOR INHALATION 3 %
4 VIAL, NEBULIZER (ML) INHALATION ONCE
Status: COMPLETED | OUTPATIENT
Start: 2018-04-19 | End: 2018-04-19

## 2018-04-19 RX ADMIN — HEPARIN SODIUM 5000 UNITS: 5000 INJECTION, SOLUTION INTRAVENOUS; SUBCUTANEOUS at 09:04

## 2018-04-19 RX ADMIN — INSULIN ASPART 3 UNITS: 100 INJECTION, SOLUTION INTRAVENOUS; SUBCUTANEOUS at 09:04

## 2018-04-19 RX ADMIN — INSULIN ASPART 1 UNITS: 100 INJECTION, SOLUTION INTRAVENOUS; SUBCUTANEOUS at 02:04

## 2018-04-19 RX ADMIN — STANDARDIZED SENNA CONCENTRATE AND DOCUSATE SODIUM 1 TABLET: 8.6; 5 TABLET, FILM COATED ORAL at 08:04

## 2018-04-19 RX ADMIN — HEPARIN SODIUM 5000 UNITS: 5000 INJECTION, SOLUTION INTRAVENOUS; SUBCUTANEOUS at 03:04

## 2018-04-19 RX ADMIN — CHLORHEXIDINE GLUCONATE 15 ML: 1.2 RINSE ORAL at 08:04

## 2018-04-19 RX ADMIN — PANTOPRAZOLE SODIUM 40 MG: 40 GRANULE, DELAYED RELEASE ORAL at 08:04

## 2018-04-19 RX ADMIN — IPRATROPIUM BROMIDE AND ALBUTEROL SULFATE 3 ML: .5; 3 SOLUTION RESPIRATORY (INHALATION) at 07:04

## 2018-04-19 RX ADMIN — PIPERACILLIN AND TAZOBACTAM 4.5 G: 4; .5 INJECTION, POWDER, LYOPHILIZED, FOR SOLUTION INTRAVENOUS; PARENTERAL at 05:04

## 2018-04-19 RX ADMIN — IPRATROPIUM BROMIDE AND ALBUTEROL SULFATE 3 ML: .5; 3 SOLUTION RESPIRATORY (INHALATION) at 03:04

## 2018-04-19 RX ADMIN — INSULIN ASPART 3 UNITS: 100 INJECTION, SOLUTION INTRAVENOUS; SUBCUTANEOUS at 12:04

## 2018-04-19 RX ADMIN — INSULIN ASPART 3 UNITS: 100 INJECTION, SOLUTION INTRAVENOUS; SUBCUTANEOUS at 11:04

## 2018-04-19 RX ADMIN — METHYLPREDNISOLONE SODIUM SUCCINATE 80 MG: 125 INJECTION, POWDER, FOR SOLUTION INTRAMUSCULAR; INTRAVENOUS at 08:04

## 2018-04-19 RX ADMIN — INSULIN ASPART 3 UNITS: 100 INJECTION, SOLUTION INTRAVENOUS; SUBCUTANEOUS at 08:04

## 2018-04-19 RX ADMIN — FENTANYL CITRATE 25 MCG: 50 INJECTION, SOLUTION INTRAMUSCULAR; INTRAVENOUS at 05:04

## 2018-04-19 RX ADMIN — METHYLPREDNISOLONE SODIUM SUCCINATE 80 MG: 125 INJECTION, POWDER, FOR SOLUTION INTRAMUSCULAR; INTRAVENOUS at 09:04

## 2018-04-19 RX ADMIN — Medication 12.5 MG: at 09:04

## 2018-04-19 RX ADMIN — IPRATROPIUM BROMIDE AND ALBUTEROL SULFATE 3 ML: .5; 3 SOLUTION RESPIRATORY (INHALATION) at 11:04

## 2018-04-19 RX ADMIN — MINERAL OIL AND WHITE PETROLATUM: 150; 830 OINTMENT OPHTHALMIC at 09:04

## 2018-04-19 RX ADMIN — INSULIN ASPART 1 UNITS: 100 INJECTION, SOLUTION INTRAVENOUS; SUBCUTANEOUS at 12:04

## 2018-04-19 RX ADMIN — INSULIN ASPART 3 UNITS: 100 INJECTION, SOLUTION INTRAVENOUS; SUBCUTANEOUS at 05:04

## 2018-04-19 RX ADMIN — POSACONAZOLE 400 MG: 40 SUSPENSION ORAL at 07:04

## 2018-04-19 RX ADMIN — MINERAL OIL AND WHITE PETROLATUM: 150; 830 OINTMENT OPHTHALMIC at 05:04

## 2018-04-19 RX ADMIN — MINERAL OIL AND WHITE PETROLATUM: 150; 830 OINTMENT OPHTHALMIC at 03:04

## 2018-04-19 RX ADMIN — SODIUM CHLORIDE SOLN NEBU 3% 4 ML: 3 NEBU SOLN at 07:04

## 2018-04-19 RX ADMIN — HEPARIN SODIUM 5000 UNITS: 5000 INJECTION, SOLUTION INTRAVENOUS; SUBCUTANEOUS at 05:04

## 2018-04-19 RX ADMIN — ATOVAQUONE 1500 MG: 750 SUSPENSION ORAL at 08:04

## 2018-04-19 RX ADMIN — Medication 12.5 MG: at 08:04

## 2018-04-19 RX ADMIN — CLOPIDOGREL 75 MG: 75 TABLET, FILM COATED ORAL at 09:04

## 2018-04-19 RX ADMIN — FENTANYL CITRATE 25 MCG: 50 INJECTION, SOLUTION INTRAMUSCULAR; INTRAVENOUS at 06:04

## 2018-04-19 RX ADMIN — POSACONAZOLE 400 MG: 40 SUSPENSION ORAL at 09:04

## 2018-04-19 RX ADMIN — FENTANYL CITRATE 25 MCG/HR: 50 INJECTION, SOLUTION INTRAMUSCULAR; INTRAVENOUS at 09:04

## 2018-04-19 RX ADMIN — INSULIN ASPART 5 UNITS: 100 INJECTION, SOLUTION INTRAVENOUS; SUBCUTANEOUS at 05:04

## 2018-04-19 RX ADMIN — INSULIN ASPART 3 UNITS: 100 INJECTION, SOLUTION INTRAVENOUS; SUBCUTANEOUS at 01:04

## 2018-04-19 RX ADMIN — CHLORHEXIDINE GLUCONATE 15 ML: 1.2 RINSE ORAL at 09:04

## 2018-04-19 RX ADMIN — HYDROXYCHLOROQUINE SULFATE 400 MG: 200 TABLET, FILM COATED ORAL at 08:04

## 2018-04-19 NOTE — PROGRESS NOTES
[facility]  Infectious Disease  Progress Note    Patient Name: Selma Alonzo Lux MD  MRN: [id number]  Admission Date: 4/5/2018  Length of Stay: 14 days  Attending Physician: Raffaele Almonte*  Primary Care Provider: Bhargav Hirsch MD    Isolation Status: No active isolations  Assessment/Plan:      * Acute hypoxemic respiratory failure    81yo woman w/a history of prior CVA, RA (dx 2012; RF seropositive, erosive; on HCQ/prednisone 5mg and recently started humira first dose 3/22/2018), IBS, and Hasimotos thyroiditis (goiter) who was admitted on 4/5/2018 with 2 days of dry cough and progressive SOB due to multifocal PNA (with peripheral and basilar lesions noted on CT chest) that progressively worsened despite CAP coverage x5 days, requiring intubation on 4/12 for hypoxic RF. She remains critically ill with slow improvement on expanded coverage (with vanc through negative cultures, zosyn x 7 days, empiric posa, and steroids) for all possibilities on differential of hypoxic RF (including MDRO bacterial HCAP/HAP, IFI including Aspergillus and endemic fungi, and  vs hypersensitivity pneumonitis). Course c/b KERMIT (now resolved after empiric bactrim stopped with negative GMS) and AF w/RVR (converted with amiodarone). Etiology remains unknown despite her slow improvement.    - would complete 7 day zosyn course as planned today for possible HAP/aspiration (given acute inflammation noted on cytology from bronch and no growth from cultures, have decided to offer empiric 7 day course since these diagnoses are difficult to exclude)   - would continue empiric posaconazole for coverage of the above mold species and await pending fungal cultures; all fungal markers are negative  - bactrim prophylaxis transitioned to atovaquone given recent KERMIT  - may continue steroids for now at discretion of CCU team as we cannot definitively exclude /HP  - await pending BAL cultures and will tailor therapy  with you as she improves  - low-grade temps have resolved -- await pending repeat cultures from 4/19 to exclude new nosocomial infection, negative so far              Anticipated Disposition: pending improvement    Thank you for your consult. I will follow-up with patient. Please contact us if you have any additional questions.     Carol Tan MD  Transplant ID Attending  [phone]    Carol Tan MD  Infectious Disease  [facility]    Subjective:     Principal Problem:Acute hypoxemic respiratory failure    HPI: No notes on file  Interval History: Low grade temps resolved today. No acute events.    Review of Systems   Unable to perform ROS: Intubated     Objective:     Vital Signs (Most Recent):  Temp: 98.8 °F (37.1 °C) (04/19/18 1500)  Pulse: 93 (04/19/18 1600)  Resp: 18 (04/19/18 1600)  BP: (!) 146/67 (04/19/18 1600)  SpO2: (!) 94 % (04/19/18 1600) Vital Signs (24h Range):  Temp:  [98.8 °F (37.1 °C)-99.7 °F (37.6 °C)] 98.8 °F (37.1 °C)  Pulse:  [78-93] 93  Resp:  [0-56] 18  SpO2:  [91 %-97 %] 94 %  BP: (112-164)/(53-75) 146/67  Arterial Line BP: (103-183)/(38-67) 158/61     Weight: 71.5 kg (157 lb 10.1 oz)  Body mass index is 26.23 kg/m².    Estimated Creatinine Clearance: 37.1 mL/min (based on SCr of 1.2 mg/dL).    Physical Exam   Constitutional: She appears well-developed. No distress.   Intubated/sedated.   HENT:   Head: Atraumatic.   Mouth/Throat: Oropharynx is clear and moist. No oropharyngeal exudate.   Eyes: Conjunctivae and EOM are normal. Pupils are equal, round, and reactive to light. No scleral icterus.   Neck: Neck supple.   Cardiovascular: Normal rate and regular rhythm.  Exam reveals no friction rub.    No murmur heard.  Pulmonary/Chest: No respiratory distress. She has no wheezes. She has rales. She exhibits no tenderness.   On vent, 40% FiO2.   Abdominal: Soft. Bowel sounds are normal. She exhibits no distension. There is no tenderness. There is no rebound and no  guarding.   Musculoskeletal: Normal range of motion. She exhibits no edema.   Lymphadenopathy:     She has no cervical adenopathy.   Neurological:   Intubated/sedated.   Skin: No rash noted. No erythema.       Significant Labs:   CBC:     Recent Labs  Lab 04/18/18  0406 04/19/18  0244   WBC 19.88* 21.52*   HGB 9.1* 9.1*   HCT 30.7* 29.5*   * 415*     CMP:     Recent Labs  Lab 04/18/18  0408  04/18/18  1605 04/19/18  0205 04/19/18  0244 04/19/18  0245   *  < > 151* 154*  --  152*   K 4.9  < > 4.0 3.2*  --  4.3     < > 111* 120*  --  111*   CO2 31*  < > 32* 26  --  32*   *  < > 210* 144*  --  170*   BUN 78*  < > 71* 53*  --  68*   CREATININE 1.5*  < > 1.3 0.9  --  1.2   CALCIUM 8.4*  < > 8.3* 6.6*  --  8.4*   PROT 6.4  --   --   --  6.4 6.5   ALBUMIN 1.7*  --   --   --  1.8* 1.8*   BILITOT 0.3  --   --   --  0.5 0.5   ALKPHOS 70  --   --   --  66 63   AST 13  --   --   --  17 17   ALT 14  --   --   --  14 13   ANIONGAP 13  < > 8 8  --  9   EGFRNONAA 32.7*  < > 38.8* >60.0  --  42.8*   < > = values in this interval not displayed.    Significant Imaging: I have reviewed all pertinent imaging results/findings within the past 24 hours.     CT chest:  1. Progression of bilateral patchy and confluent pulmonary consolidation compatible with pneumonia less likely edema or noninfectious inflammation.  There is air noted throughout the esophagus.  2. Stable markedly enlarged right thyroid lobe with large hypodense nodule measuring 3.2 x 2.4 x 4.7 cm.  3. Right renal cyst.  4. Enlarged lobular uterus with calcifications compatible with fibroids.  5. Additional findings as above.     Microbiology:  4/5 blood cx negative  4/5 urine cx negative  4/10 C.diff negative  4/11 blood cx negative  4/11 BAL cx NGTD, cytology with acute inflammation; GMS negative  4/11 aspergillus antigen negative  4/11 fungitell negative  4/11 urine histo negative  4/11 urine blasto negative  4/11 serum crypto negative  4/11 RVP  pending  4/11 urine legionella negative

## 2018-04-19 NOTE — ASSESSMENT & PLAN NOTE
81yo woman w/a history of prior CVA, RA (dx 2012; RF seropositive, erosive; on HCQ/prednisone 5mg and recently started humira first dose 3/22/2018), IBS, and Hasimotos thyroiditis (goiter) who was admitted on 4/5/2018 with 2 days of dry cough and progressive SOB due to multifocal PNA (with peripheral and basilar lesions noted on CT chest) that progressively worsened despite CAP coverage x5 days, requiring intubation on 4/12 for hypoxic RF. She remains critically ill with slow improvement on expanded coverage (with vanc through negative cultures, zosyn x 7 days, empiric posa, and steroids) for all possibilities on differential of hypoxic RF (including MDRO bacterial HCAP/HAP, IFI including Aspergillus and endemic fungi, and  vs hypersensitivity pneumonitis). Course c/b KERMIT (now resolved after empiric bactrim stopped with negative GMS) and AF w/RVR (converted with amiodarone). Etiology remains unknown despite her slow improvement.    - would complete 7 day zosyn course as planned today for possible HAP/aspiration (given acute inflammation noted on cytology from bronch and no growth from cultures, have decided to offer empiric 7 day course since these diagnoses are difficult to exclude)   - would continue empiric posaconazole for coverage of the above mold species and await pending fungal cultures; all fungal markers are negative  - bactrim prophylaxis transitioned to atovaquone given recent KERMIT  - may continue steroids for now at discretion of CCU team as we cannot definitively exclude /HP  - await pending BAL cultures and will tailor therapy with you as she improves  - low-grade temps have resolved -- await pending repeat cultures from 4/19 to exclude new nosocomial infection, negative so far

## 2018-04-19 NOTE — SUBJECTIVE & OBJECTIVE
Interval History/Significant Events: episode of emesis; TF's held, no ileus/SBO noted on KUB. Tolerating TF's well today.    Review of Systems   Unable to perform ROS: Intubated     Objective:     Vital Signs (Most Recent):  Temp: 99 °F (37.2 °C) (04/19/18 0700)  Pulse: 85 (04/19/18 0800)  Resp: (!) 56 (04/19/18 0800)  BP: (!) 145/66 (04/19/18 0800)  SpO2: (!) 93 % (04/19/18 0800) Vital Signs (24h Range):  Temp:  [98.7 °F (37.1 °C)-100.5 °F (38.1 °C)] 99 °F (37.2 °C)  Pulse:  [76-86] 85  Resp:  [0-56] 56  SpO2:  [89 %-97 %] 93 %  BP: (109-159)/(53-75) 145/66  Arterial Line BP: (102-165)/(38-57) 143/48   Weight: 71.5 kg (157 lb 10.1 oz)  Body mass index is 26.23 kg/m².      Intake/Output Summary (Last 24 hours) at 04/19/18 0845  Last data filed at 04/19/18 0800   Gross per 24 hour   Intake          2393.75 ml   Output             2385 ml   Net             8.75 ml       Physical Exam   Constitutional: She appears ill. No distress. She is intubated.   HENT:   Head: Normocephalic and atraumatic.   Eyes: Conjunctivae are normal. Right eye exhibits no discharge and no exudate. Left eye exhibits no discharge and no exudate. No scleral icterus. Right eye exhibits no nystagmus. Left eye exhibits no nystagmus.   Neck: No tracheal deviation present.   Cardiovascular: Normal rate, normal heart sounds and intact distal pulses.  An irregularly irregular rhythm present. PMI is not displaced.    No murmur heard.  Pulses:       Radial pulses are 2+ on the right side, and 2+ on the left side.        Dorsalis pedis pulses are 2+ on the right side, and 2+ on the left side.   Warm extremities  1+ bilateral UE's edema, pitting   Pulmonary/Chest: Effort normal. No accessory muscle usage. Tachypnea noted. She is intubated. No respiratory distress. She has no decreased breath sounds. She has no wheezes. She has no rhonchi. She has rales in the right middle field, the right lower field, the left middle field and the left lower field.    Abdominal: Soft. Normal appearance and bowel sounds are normal.   Lymphadenopathy:     She has no cervical adenopathy.   Neurological: GCS eye subscore is 2. GCS verbal subscore is 1. GCS motor subscore is 4.   Pupils 3 mm equal, round and reactive to light.  No gaze deviation, nystagmus or roving eye movements.  +cough and gag reflex. Breathing over vent  Will open eyes to pain, but eyes close within 2 seconds. Withdraws from pain x 4 extremities   Skin: Skin is warm, dry and intact. She is not diaphoretic.       Vents:  Vent Mode: A/C (04/19/18 0700)  Set Rate: 16 bmp (04/19/18 0700)  Vt Set: 375 mL (04/19/18 0700)  Pressure Support: 0 cmH20 (04/19/18 0700)  PEEP/CPAP: 5 cmH20 (04/19/18 0700)  Oxygen Concentration (%): 40 (04/19/18 0800)  Peak Airway Pressure: 33 cmH2O (04/19/18 0700)  Plateau Pressure: 27 cmH20 (04/19/18 0700)  Total Ve: 11.9 mL (04/19/18 0700)  F/VT Ratio<105 (RSBI): (!) 73.6 (04/19/18 0700)  Lines/Drains/Airways     Central Venous Catheter Line                 Percutaneous Central Line Insertion/Assessment - triple lumen  04/11/18 1513 right subclavian 7 days          Drain                 NG/OG Tube 04/11/18 1340 Left mouth 7 days         Urethral Catheter 04/11/18 1430 Double-lumen 16 Fr. 7 days         Rectal Tube 04/16/18 0600 rectal tube w/ balloon (indicate number of mLs) 3 days          Airway                 Airway - Non-Surgical 04/11/18 1337 Endotracheal Tube 7 days          Arterial Line                 Arterial Line 04/11/18 1540 Right Radial 7 days              Significant Labs:    CBC/Anemia Profile:    Recent Labs  Lab 04/18/18  0406 04/19/18  0244   WBC 19.88* 21.52*   HGB 9.1* 9.1*   HCT 30.7* 29.5*   * 415*   MCV 79* 79*   RDW 17.5* 17.9*        Chemistries:    Recent Labs  Lab 04/18/18  0408  04/18/18  1605 04/19/18  0205 04/19/18  0244 04/19/18  0245   *  < > 151* 154*  --  152*   K 4.9  < > 4.0 3.2*  --  4.3     < > 111* 120*  --  111*   CO2 31*  < >  32* 26  --  32*   BUN 78*  < > 71* 53*  --  68*   CREATININE 1.5*  < > 1.3 0.9  --  1.2   CALCIUM 8.4*  < > 8.3* 6.6*  --  8.4*   ALBUMIN 1.7*  --   --   --  1.8* 1.8*   PROT 6.4  --   --   --  6.4 6.5   BILITOT 0.3  --   --   --  0.5 0.5   ALKPHOS 70  --   --   --  66 63   ALT 14  --   --   --  14 13   AST 13  --   --   --  17 17   MG 2.3  --   --   --  2.6  --    PHOS 3.0  --   --   --  3.0  --    < > = values in this interval not displayed.    All pertinent labs within the past 24 hours have been reviewed.    Significant Imaging:  I have reviewed and interpreted all pertinent imaging results/findings within the past 24 hours.

## 2018-04-19 NOTE — PLAN OF CARE
Problem: Patient Care Overview  Goal: Plan of Care Review  Outcome: Ongoing (interventions implemented as appropriate)  POC reviewed with pt and family. Pt had 1 episode of vomiting through out shift. TF stopped and abd x-ray neg for ileus. Tf currently running @20ml; no issues since restarted. Pt remains on precedex throughout shift. Ph remains elevated @ 7.6. Team aware. WCTM

## 2018-04-19 NOTE — SUBJECTIVE & OBJECTIVE
Interval History: Low grade temps resolved today. No acute events.    Review of Systems   Unable to perform ROS: Intubated     Objective:     Vital Signs (Most Recent):  Temp: 98.8 °F (37.1 °C) (04/19/18 1500)  Pulse: 93 (04/19/18 1600)  Resp: 18 (04/19/18 1600)  BP: (!) 146/67 (04/19/18 1600)  SpO2: (!) 94 % (04/19/18 1600) Vital Signs (24h Range):  Temp:  [98.8 °F (37.1 °C)-99.7 °F (37.6 °C)] 98.8 °F (37.1 °C)  Pulse:  [78-93] 93  Resp:  [0-56] 18  SpO2:  [91 %-97 %] 94 %  BP: (112-164)/(53-75) 146/67  Arterial Line BP: (103-183)/(38-67) 158/61     Weight: 71.5 kg (157 lb 10.1 oz)  Body mass index is 26.23 kg/m².    Estimated Creatinine Clearance: 37.1 mL/min (based on SCr of 1.2 mg/dL).    Physical Exam   Constitutional: She appears well-developed. No distress.   Intubated/sedated.   HENT:   Head: Atraumatic.   Mouth/Throat: Oropharynx is clear and moist. No oropharyngeal exudate.   Eyes: Conjunctivae and EOM are normal. Pupils are equal, round, and reactive to light. No scleral icterus.   Neck: Neck supple.   Cardiovascular: Normal rate and regular rhythm.  Exam reveals no friction rub.    No murmur heard.  Pulmonary/Chest: No respiratory distress. She has no wheezes. She has rales. She exhibits no tenderness.   On vent, 40% FiO2.   Abdominal: Soft. Bowel sounds are normal. She exhibits no distension. There is no tenderness. There is no rebound and no guarding.   Musculoskeletal: Normal range of motion. She exhibits no edema.   Lymphadenopathy:     She has no cervical adenopathy.   Neurological:   Intubated/sedated.   Skin: No rash noted. No erythema.       Significant Labs:   CBC:     Recent Labs  Lab 04/18/18  0406 04/19/18  0244   WBC 19.88* 21.52*   HGB 9.1* 9.1*   HCT 30.7* 29.5*   * 415*     CMP:     Recent Labs  Lab 04/18/18  0408  04/18/18  1605 04/19/18  0205 04/19/18  0244 04/19/18  0245   *  < > 151* 154*  --  152*   K 4.9  < > 4.0 3.2*  --  4.3     < > 111* 120*  --  111*   CO2 31*   < > 32* 26  --  32*   *  < > 210* 144*  --  170*   BUN 78*  < > 71* 53*  --  68*   CREATININE 1.5*  < > 1.3 0.9  --  1.2   CALCIUM 8.4*  < > 8.3* 6.6*  --  8.4*   PROT 6.4  --   --   --  6.4 6.5   ALBUMIN 1.7*  --   --   --  1.8* 1.8*   BILITOT 0.3  --   --   --  0.5 0.5   ALKPHOS 70  --   --   --  66 63   AST 13  --   --   --  17 17   ALT 14  --   --   --  14 13   ANIONGAP 13  < > 8 8  --  9   EGFRNONAA 32.7*  < > 38.8* >60.0  --  42.8*   < > = values in this interval not displayed.    Significant Imaging: I have reviewed all pertinent imaging results/findings within the past 24 hours.     CT chest:  1. Progression of bilateral patchy and confluent pulmonary consolidation compatible with pneumonia less likely edema or noninfectious inflammation.  There is air noted throughout the esophagus.  2. Stable markedly enlarged right thyroid lobe with large hypodense nodule measuring 3.2 x 2.4 x 4.7 cm.  3. Right renal cyst.  4. Enlarged lobular uterus with calcifications compatible with fibroids.  5. Additional findings as above.     Microbiology:  4/5 blood cx negative  4/5 urine cx negative  4/10 C.diff negative  4/11 blood cx negative  4/11 BAL cx NGTD, cytology with acute inflammation; GMS negative  4/11 aspergillus antigen negative  4/11 fungitell negative  4/11 urine histo negative  4/11 urine blasto negative  4/11 serum crypto negative  4/11 RVP pending  4/11 urine legionella negative

## 2018-04-19 NOTE — PLAN OF CARE
Problem: Patient Care Overview  Goal: Plan of Care Review  Outcome: Ongoing (interventions implemented as appropriate)  Pt weaned off all sedation this shift. She is more responsive today as she opens eyes to voice, intermittently follows commands and nods head when asked questions.   Pt remains ventilated Fio2 30%, PEEP 5. SBT performed this morning and pt did well for approx 2-3 hours, but became more tachypneic  with elevated BP. Pt placed back on rate to give her a break per Dr Almonte.   Pt has been calm all shift with no agitation noted.  Pt remains on tube feeds and slowly increased back to goal of 40 ml/hr.  Hill DC'd per order this shift and Purewik placed. Pt has not urinated yet and notified NP while at bedside. Hill pulled at 1400. NP would like to monitor for awhile and after 6 hours bladder scan if no urine.   Pt afebrile this shift.   Daughter and sister at bedside. KAMLA Lim came to bedside to update pt's daughter who just flew in from [place].  POC: Keep sedation off to allow pt to continue to wake up more and continue SBT to hopefully extubate.   All questions answered and concerns addressed. Will continue to monitor.

## 2018-04-19 NOTE — PROGRESS NOTES
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 She was seen by her PCP on 4/2 for these complaints and received a prescription for duo nebs and tessalon perles. CXR performed on 4/2 noted bilateral basilar infiltrates. At that time, she denied fever, chills, sputum production, sick contacts. She was admitted to  and treated emprically for CAP with rocephin and azithromycin. She was also diuresed daily. However, her oxygen requirements slowly increased from 2 L NC to 4 L NC with also progressively worsening bilateral infiltrates on imaging. CTA performed on 4/9 negative for PE, however noted significant bilateral consolidations. On 4/11, her hypoxemia continued to worsen, requiring NRB mask, and she developed severe respiratory distress. She was transferred to the MICU and emergently intubated. Repeat CT chest performed on 4/11 demonstrated progressive worsening of bilateral peripheral infiltrates.    Hospital/ICU Course:  Admitted to MICU on 4/11, intubated emergently for severe hypoxemia and respiratory distress. ID was consulted for assistance in management given immunocompromised state. Patient paralyzed with nimbex following persistent dysynchrony with vent despite sedation. Abx broadened to vancomycin, zosyn, bactrim, and posaconazole. Norepinephrine initiated for BP support.  Bronchoscopy performed at bedside on 4/11 and all cultures NGTD; pending official fungal culture results. Nimbex was discontinued on 4/12. She continued to require significant oxygen support thereafter, and was unable to wean oxygen requirements significantly since intubation. However, she has made slow improvements in her oxygenation since 4/18. She went into A-fib RVR in 120-160's on the evening of 4/17, and was briefly started on amiodarone gtt; this discontinued and metoprolol added with good rate control.     Interval History/Significant Events: episode of emesis; TF's held, no ileus/SBO noted on KUB. Tolerating TF's well today.    Review of Systems   Unable to perform ROS: Intubated     Objective:     Vital Signs (Most Recent):  Temp: 99 °F (37.2 °C) (04/19/18 0700)  Pulse: 85 (04/19/18 0800)  Resp: (!) 56 (04/19/18 0800)  BP: (!) 145/66 (04/19/18 0800)  SpO2: (!) 93 % (04/19/18 0800) Vital Signs (24h Range):  Temp:  [98.7 °F (37.1 °C)-100.5 °F (38.1 °C)] 99 °F (37.2 °C)  Pulse:  [76-86] 85  Resp:  [0-56] 56  SpO2:  [89 %-97 %] 93 %  BP: (109-159)/(53-75) 145/66  Arterial Line BP: (102-165)/(38-57) 143/48   Weight: 71.5 kg (157 lb 10.1 oz)  Body mass index is 26.23 kg/m².      Intake/Output Summary (Last 24 hours) at 04/19/18 0845  Last data filed at 04/19/18 0800   Gross per 24 hour   Intake          2393.75 ml   Output             2385 ml   Net             8.75 ml       Physical Exam   Constitutional: She appears ill. No distress. She is intubated.   HENT:   Head: Normocephalic and atraumatic.   Eyes: Conjunctivae are normal. Right eye exhibits no discharge and no exudate. Left eye exhibits no discharge and no exudate. No scleral icterus. Right eye exhibits no nystagmus. Left eye exhibits no nystagmus.   Neck: No tracheal deviation present.   Cardiovascular: Normal rate, normal heart sounds and intact distal pulses.  An irregularly irregular rhythm present. PMI is not displaced.    No murmur  heard.  Pulses:       Radial pulses are 2+ on the right side, and 2+ on the left side.        Dorsalis pedis pulses are 2+ on the right side, and 2+ on the left side.   Warm extremities  1+ bilateral UE's edema, pitting   Pulmonary/Chest: Effort normal. No accessory muscle usage. Tachypnea noted. She is intubated. No respiratory distress. She has no decreased breath sounds. She has no wheezes. She has no rhonchi. She has rales in the right middle field, the right lower field, the left middle field and the left lower field.   Abdominal: Soft. Normal appearance and bowel sounds are normal.   Lymphadenopathy:     She has no cervical adenopathy.   Neurological: GCS eye subscore is 2. GCS verbal subscore is 1. GCS motor subscore is 4.   Pupils 3 mm equal, round and reactive to light.  No gaze deviation, nystagmus or roving eye movements.  +cough and gag reflex. Breathing over vent  Will open eyes to pain, but eyes close within 2 seconds. Withdraws from pain x 4 extremities   Skin: Skin is warm, dry and intact. She is not diaphoretic.       Vents:  Vent Mode: A/C (04/19/18 0700)  Set Rate: 16 bmp (04/19/18 0700)  Vt Set: 375 mL (04/19/18 0700)  Pressure Support: 0 cmH20 (04/19/18 0700)  PEEP/CPAP: 5 cmH20 (04/19/18 0700)  Oxygen Concentration (%): 40 (04/19/18 0800)  Peak Airway Pressure: 33 cmH2O (04/19/18 0700)  Plateau Pressure: 27 cmH20 (04/19/18 0700)  Total Ve: 11.9 mL (04/19/18 0700)  F/VT Ratio<105 (RSBI): (!) 73.6 (04/19/18 0700)  Lines/Drains/Airways     Central Venous Catheter Line                 Percutaneous Central Line Insertion/Assessment - triple lumen  04/11/18 1513 right subclavian 7 days          Drain                 NG/OG Tube 04/11/18 1340 Left mouth 7 days         Urethral Catheter 04/11/18 1430 Double-lumen 16 Fr. 7 days         Rectal Tube 04/16/18 0600 rectal tube w/ balloon (indicate number of mLs) 3 days          Airway                 Airway - Non-Surgical 04/11/18 1337 Endotracheal Tube 7  days          Arterial Line                 Arterial Line 04/11/18 1540 Right Radial 7 days              Significant Labs:    CBC/Anemia Profile:    Recent Labs  Lab 04/18/18  0406 04/19/18  0244   WBC 19.88* 21.52*   HGB 9.1* 9.1*   HCT 30.7* 29.5*   * 415*   MCV 79* 79*   RDW 17.5* 17.9*        Chemistries:    Recent Labs  Lab 04/18/18  0408  04/18/18  1605 04/19/18  0205 04/19/18  0244 04/19/18  0245   *  < > 151* 154*  --  152*   K 4.9  < > 4.0 3.2*  --  4.3     < > 111* 120*  --  111*   CO2 31*  < > 32* 26  --  32*   BUN 78*  < > 71* 53*  --  68*   CREATININE 1.5*  < > 1.3 0.9  --  1.2   CALCIUM 8.4*  < > 8.3* 6.6*  --  8.4*   ALBUMIN 1.7*  --   --   --  1.8* 1.8*   PROT 6.4  --   --   --  6.4 6.5   BILITOT 0.3  --   --   --  0.5 0.5   ALKPHOS 70  --   --   --  66 63   ALT 14  --   --   --  14 13   AST 13  --   --   --  17 17   MG 2.3  --   --   --  2.6  --    PHOS 3.0  --   --   --  3.0  --    < > = values in this interval not displayed.    All pertinent labs within the past 24 hours have been reviewed.    Significant Imaging:  I have reviewed and interpreted all pertinent imaging results/findings within the past 24 hours.    Assessment/Plan:     Neuro   Cerebrovascular accident (CVA)    --Remote lacunar infarcts in the right centrum semi-ovale, basal ganglia, and right tatyana per CT w/o contrast on 12/2017.  --Continue home clopidogrel.        Encephalopathy, metabolic    --suspect secondary to sedatives for ventilator dyssynchrony and critical illness.    --stop precedex for daily SAT's  --minimize sedation, opiates, deliriogenic medications           Pulmonary   * Acute hypoxemic respiratory failure    --Suspect secondary to severe bilateral pneumonia in the setting of immunosuppression; possibly  vs reaction from Humira   --Severely worsened over first week of admission despite CAP treatment.  --s/p emergent intubation on 4/11 upon MICU admission for severe hypoxemia and severe  respiratory distress.  --Continue mechanical ventilation, wean support as tolerated.  --Bronchoscopy on 4/11 with wash; culture with few WBCs, no organisms.  --Antibiotics broadened to vancomycin, zosyn, bactrim, and posaconazole initally; appreciate ID assistance with management. S/p week course of zosyn (stop date 4/19). Continue posiconazole for now. Atovoquone for PCP prophylaxis  --Blood, sputum, and urine cultures negative from 4/10.   --KOH prep negative, AFB stain with no acid fast bacilli noted, fungal culture pending  --Aspergillus, Histoplasma, and Cryptococcal antigen in blood is negative. Legionella and blasomyces negative.   --Continue IV steroids for possibility of            Pneumonia of both upper lobes due to infectious organism    --see above        ARDS (adult respiratory distress syndrome)    --see above        Cardiac/Vascular   Hypertension, essential    --continue to hold htn meds as patient normotensive        Atrial fibrillation with RVR    --noted on 4/17. New this admission  --continue metoprolol for rate control.   --CHADS Vasc score is 7; will discuss anticoagulation, but likely would defer until clinical picture more stable           Renal/   Metabolic alkalosis    --slowly improving  --ABG q6        Hypernatremia    --free water boluses 300 q6h        KERMIT (acute kidney injury)    --possibly ATN from hypoperfusion, bactrim and Vancomycin in conjunction with Pip/Tazo in setting of contrast exposure  --bactrim changed to atovaquone  --lasix challenge on 4/17 with good urine output  --neprhology following, appreciate recs  --trend BMP, intake output. Clinically improving          Endocrine   Thyroid enlargement    --History of Hashimoto's thyroiditis.  --Intrathoracic extension on CT.  --TSH and free T4 wnl.          Hyperglycemia    --Likely steroid-induced.  --transitioned from gtt to SQ insulin.  --Goal -180.        GI   PUD (peptic ulcer disease)    --Continue PPI.         Orthopedic   Seropositive rheumatoid arthritis of multiple sites    --Appreciate Rheumatology assistance.  --Continue hydroxychloroquine.  --Hold home prednisone; switched to IV SoluMedrol 80mg Q12h.        Other   Long term (current) use of systemic steroids    --see above          Critical Care Time: 55 minutes  Critical secondary to Patient has a condition that poses threat to life and bodily function: hypoxemic respiratory failure      Critical care was time spent personally by me on the following activities: development of treatment plan with patient or surrogate and bedside caregivers, discussions with consultants, evaluation of patient's response to treatment, examination of patient, ordering and performing treatments and interventions, ordering and review of laboratory studies, ordering and review of radiographic studies, pulse oximetry, re-evaluation of patient's condition. This critical care time did not overlap with that of any other provider or involve time for any procedures.     Carina Rodney, NP  Critical Care Medicine  [facility]

## 2018-04-19 NOTE — PROGRESS NOTES
Notified KAMLA Cleaning that pt throwing up TF. 7pm residual was 100ml. Tf turned off. NP placing orders for antiemetic. TM

## 2018-04-19 NOTE — PLAN OF CARE
Patient remains in ICU intubated and sedated.  Plan for daily SBTs and continued vent weaning.  KERMIT improving.  Disposition plan remains TBD as patient becomes medically appropriate and able to participate in PT/OT.  CM will continue to follow.     04/19/18 1107   Right Care Assessment   Can the patient answer the patient profile reliably? No, cognitively impaired   How often would a person be available to care for the patient? Often   Describe the patient's ability to walk at the present time. Does not walk or unable to take any steps at all   How does the patient rate their overall health at the present time? (KERRY)   Number of comorbid conditions (as recorded on the chart) One   During the past month, has the patient often been bothered by feeling down, depressed or hopeless? (KERRY)   During the past month, has the patient often been bothered by little interest or pleasure in doing things? (KERRY)   Kayleen Arellano RN, BSN  Case Management  [facility]  Ext. [phone]

## 2018-04-20 DIAGNOSIS — I10 ESSENTIAL HYPERTENSION: ICD-10-CM

## 2018-04-20 PROBLEM — N17.9 AKI (ACUTE KIDNEY INJURY): Status: RESOLVED | Noted: 2018-04-15 | Resolved: 2018-04-20

## 2018-04-20 LAB
ALBUMIN SERPL BCP-MCNC: 2 G/DL
ALP SERPL-CCNC: 81 U/L
ALT SERPL W/O P-5'-P-CCNC: 12 U/L
ANION GAP SERPL CALC-SCNC: 7 MMOL/L
ANION GAP SERPL CALC-SCNC: 8 MMOL/L
ANION GAP SERPL CALC-SCNC: 9 MMOL/L
ANISOCYTOSIS BLD QL SMEAR: SLIGHT
AST SERPL-CCNC: 11 U/L
BASOPHILS # BLD AUTO: 0.02 K/UL
BASOPHILS NFR BLD: 0.1 %
BILIRUB DIRECT SERPL-MCNC: 0.4 MG/DL
BILIRUB SERPL-MCNC: 0.7 MG/DL
BUN SERPL-MCNC: 58 MG/DL
BUN SERPL-MCNC: 61 MG/DL
BUN SERPL-MCNC: 62 MG/DL
CALCIUM SERPL-MCNC: 8.2 MG/DL
CALCIUM SERPL-MCNC: 8.8 MG/DL
CALCIUM SERPL-MCNC: 9 MG/DL
CHLORIDE SERPL-SCNC: 113 MMOL/L
CHLORIDE SERPL-SCNC: 114 MMOL/L
CHLORIDE SERPL-SCNC: 115 MMOL/L
CO2 SERPL-SCNC: 30 MMOL/L
CO2 SERPL-SCNC: 32 MMOL/L
CO2 SERPL-SCNC: 33 MMOL/L
CREAT SERPL-MCNC: 1.1 MG/DL
CREAT SERPL-MCNC: 1.1 MG/DL
CREAT SERPL-MCNC: 1.2 MG/DL
DACRYOCYTES BLD QL SMEAR: ABNORMAL
DIFFERENTIAL METHOD: ABNORMAL
EOSINOPHIL # BLD AUTO: 0 K/UL
EOSINOPHIL NFR BLD: 0 %
ERYTHROCYTE [DISTWIDTH] IN BLOOD BY AUTOMATED COUNT: 18.6 %
EST. GFR  (AFRICAN AMERICAN): 49.3 ML/MIN/1.73 M^2
EST. GFR  (AFRICAN AMERICAN): 54.8 ML/MIN/1.73 M^2
EST. GFR  (AFRICAN AMERICAN): 54.8 ML/MIN/1.73 M^2
EST. GFR  (NON AFRICAN AMERICAN): 42.8 ML/MIN/1.73 M^2
EST. GFR  (NON AFRICAN AMERICAN): 47.5 ML/MIN/1.73 M^2
EST. GFR  (NON AFRICAN AMERICAN): 47.5 ML/MIN/1.73 M^2
GIANT PLATELETS BLD QL SMEAR: PRESENT
GLUCOSE SERPL-MCNC: 171 MG/DL
GLUCOSE SERPL-MCNC: 235 MG/DL
GLUCOSE SERPL-MCNC: 252 MG/DL
HCT VFR BLD AUTO: 34.3 %
HGB BLD-MCNC: 10 G/DL
HYPOCHROMIA BLD QL SMEAR: ABNORMAL
IMM GRANULOCYTES # BLD AUTO: 0.35 K/UL
IMM GRANULOCYTES NFR BLD AUTO: 1.2 %
INR PPP: 1.3
LACTATE SERPL-SCNC: 2 MMOL/L
LYMPHOCYTES # BLD AUTO: 0.7 K/UL
LYMPHOCYTES NFR BLD: 2.2 %
MAGNESIUM SERPL-MCNC: 2.3 MG/DL
MCH RBC QN AUTO: 23.4 PG
MCHC RBC AUTO-ENTMCNC: 29.2 G/DL
MCV RBC AUTO: 80 FL
MONOCYTES # BLD AUTO: 1.5 K/UL
MONOCYTES NFR BLD: 4.9 %
NEUTROPHILS # BLD AUTO: 27.1 K/UL
NEUTROPHILS NFR BLD: 91.6 %
NRBC BLD-RTO: 0 /100 WBC
OVALOCYTES BLD QL SMEAR: ABNORMAL
PHOSPHATE SERPL-MCNC: 2.6 MG/DL
PLATELET # BLD AUTO: 464 K/UL
PLATELET BLD QL SMEAR: ABNORMAL
PMV BLD AUTO: 10.2 FL
POCT GLUCOSE: 164 MG/DL (ref 70–110)
POCT GLUCOSE: 188 MG/DL (ref 70–110)
POCT GLUCOSE: 193 MG/DL (ref 70–110)
POIKILOCYTOSIS BLD QL SMEAR: SLIGHT
POLYCHROMASIA BLD QL SMEAR: ABNORMAL
POTASSIUM SERPL-SCNC: 4 MMOL/L
POTASSIUM SERPL-SCNC: 4.3 MMOL/L
POTASSIUM SERPL-SCNC: 4.5 MMOL/L
PROT SERPL-MCNC: 6.6 G/DL
PROTHROMBIN TIME: 12.9 SEC
RBC # BLD AUTO: 4.27 M/UL
SCHISTOCYTES BLD QL SMEAR: PRESENT
SODIUM SERPL-SCNC: 152 MMOL/L
SODIUM SERPL-SCNC: 152 MMOL/L
SODIUM SERPL-SCNC: 157 MMOL/L
TARGETS BLD QL SMEAR: ABNORMAL
WBC # BLD AUTO: 29.61 K/UL

## 2018-04-20 PROCEDURE — 85610 PROTHROMBIN TIME: CPT

## 2018-04-20 PROCEDURE — 83605 ASSAY OF LACTIC ACID: CPT

## 2018-04-20 PROCEDURE — 25000003 PHARM REV CODE 250: Performed by: STUDENT IN AN ORGANIZED HEALTH CARE EDUCATION/TRAINING PROGRAM

## 2018-04-20 PROCEDURE — 94761 N-INVAS EAR/PLS OXIMETRY MLT: CPT

## 2018-04-20 PROCEDURE — 99900035 HC TECH TIME PER 15 MIN (STAT)

## 2018-04-20 PROCEDURE — 25000003 PHARM REV CODE 250: Performed by: NURSE PRACTITIONER

## 2018-04-20 PROCEDURE — 25000003 PHARM REV CODE 250: Performed by: GENERAL ACUTE CARE HOSPITAL

## 2018-04-20 PROCEDURE — 99291 CRITICAL CARE FIRST HOUR: CPT | Mod: ,,, | Performed by: NURSE PRACTITIONER

## 2018-04-20 PROCEDURE — 84145 PROCALCITONIN (PCT): CPT

## 2018-04-20 PROCEDURE — 84100 ASSAY OF PHOSPHORUS: CPT

## 2018-04-20 PROCEDURE — 25000003 PHARM REV CODE 250: Performed by: INTERNAL MEDICINE

## 2018-04-20 PROCEDURE — 99900026 HC AIRWAY MAINTENANCE (STAT)

## 2018-04-20 PROCEDURE — 94003 VENT MGMT INPAT SUBQ DAY: CPT

## 2018-04-20 PROCEDURE — 27000221 HC OXYGEN, UP TO 24 HOURS

## 2018-04-20 PROCEDURE — 80048 BASIC METABOLIC PNL TOTAL CA: CPT

## 2018-04-20 PROCEDURE — 94640 AIRWAY INHALATION TREATMENT: CPT

## 2018-04-20 PROCEDURE — 85025 COMPLETE CBC W/AUTO DIFF WBC: CPT

## 2018-04-20 PROCEDURE — 80076 HEPATIC FUNCTION PANEL: CPT

## 2018-04-20 PROCEDURE — 25000242 PHARM REV CODE 250 ALT 637 W/ HCPCS: Performed by: INTERNAL MEDICINE

## 2018-04-20 PROCEDURE — 63600175 PHARM REV CODE 636 W HCPCS: Performed by: NURSE PRACTITIONER

## 2018-04-20 PROCEDURE — 20000000 HC ICU ROOM

## 2018-04-20 PROCEDURE — 83735 ASSAY OF MAGNESIUM: CPT

## 2018-04-20 RX ORDER — FENTANYL CITRATE-0.9 % NACL/PF 10 MCG/ML
25 PLASTIC BAG, INJECTION (ML) INTRAVENOUS CONTINUOUS
Status: DISCONTINUED | OUTPATIENT
Start: 2018-04-20 | End: 2018-04-20

## 2018-04-20 RX ORDER — AMLODIPINE BESYLATE 5 MG/1
TABLET ORAL
Qty: 90 TABLET | Refills: 2 | OUTPATIENT
Start: 2018-04-20

## 2018-04-20 RX ORDER — FENTANYL CITRATE-0.9 % NACL/PF 10 MCG/ML
10 PLASTIC BAG, INJECTION (ML) INTRAVENOUS CONTINUOUS
Status: DISCONTINUED | OUTPATIENT
Start: 2018-04-20 | End: 2018-04-22

## 2018-04-20 RX ORDER — DEXMEDETOMIDINE HYDROCHLORIDE 4 UG/ML
0.2 INJECTION, SOLUTION INTRAVENOUS CONTINUOUS
Status: DISCONTINUED | OUTPATIENT
Start: 2018-04-20 | End: 2018-04-22

## 2018-04-20 RX ORDER — DEXTROSE MONOHYDRATE 50 MG/ML
INJECTION, SOLUTION INTRAVENOUS CONTINUOUS
Status: DISCONTINUED | OUTPATIENT
Start: 2018-04-20 | End: 2018-04-20

## 2018-04-20 RX ORDER — DEXTROSE MONOHYDRATE AND SODIUM CHLORIDE 5; .45 G/100ML; G/100ML
INJECTION, SOLUTION INTRAVENOUS CONTINUOUS
Status: DISCONTINUED | OUTPATIENT
Start: 2018-04-20 | End: 2018-04-20

## 2018-04-20 RX ADMIN — POSACONAZOLE 400 MG: 40 SUSPENSION ORAL at 08:04

## 2018-04-20 RX ADMIN — HYDRALAZINE HYDROCHLORIDE 10 MG: 20 INJECTION INTRAMUSCULAR; INTRAVENOUS at 01:04

## 2018-04-20 RX ADMIN — SODIUM CHLORIDE 500 ML: 9 INJECTION, SOLUTION INTRAVENOUS at 10:04

## 2018-04-20 RX ADMIN — FENTANYL CITRATE 25 MCG: 50 INJECTION, SOLUTION INTRAMUSCULAR; INTRAVENOUS at 01:04

## 2018-04-20 RX ADMIN — STANDARDIZED SENNA CONCENTRATE AND DOCUSATE SODIUM 1 TABLET: 8.6; 5 TABLET, FILM COATED ORAL at 08:04

## 2018-04-20 RX ADMIN — FENTANYL CITRATE 25 MCG: 50 INJECTION, SOLUTION INTRAMUSCULAR; INTRAVENOUS at 02:04

## 2018-04-20 RX ADMIN — HEPARIN SODIUM 5000 UNITS: 5000 INJECTION, SOLUTION INTRAVENOUS; SUBCUTANEOUS at 05:04

## 2018-04-20 RX ADMIN — INSULIN ASPART 2 UNITS: 100 INJECTION, SOLUTION INTRAVENOUS; SUBCUTANEOUS at 12:04

## 2018-04-20 RX ADMIN — DEXTROSE: 5 SOLUTION INTRAVENOUS at 12:04

## 2018-04-20 RX ADMIN — IPRATROPIUM BROMIDE AND ALBUTEROL SULFATE 3 ML: .5; 3 SOLUTION RESPIRATORY (INHALATION) at 11:04

## 2018-04-20 RX ADMIN — ATOVAQUONE 1500 MG: 750 SUSPENSION ORAL at 08:04

## 2018-04-20 RX ADMIN — MINERAL OIL AND WHITE PETROLATUM: 150; 830 OINTMENT OPHTHALMIC at 05:04

## 2018-04-20 RX ADMIN — FENTANYL CITRATE 25 MCG: 50 INJECTION, SOLUTION INTRAMUSCULAR; INTRAVENOUS at 03:04

## 2018-04-20 RX ADMIN — CHLORHEXIDINE GLUCONATE 15 ML: 1.2 RINSE ORAL at 10:04

## 2018-04-20 RX ADMIN — INSULIN ASPART 3 UNITS: 100 INJECTION, SOLUTION INTRAVENOUS; SUBCUTANEOUS at 07:04

## 2018-04-20 RX ADMIN — CHLORHEXIDINE GLUCONATE 15 ML: 1.2 RINSE ORAL at 08:04

## 2018-04-20 RX ADMIN — IPRATROPIUM BROMIDE AND ALBUTEROL SULFATE 3 ML: .5; 3 SOLUTION RESPIRATORY (INHALATION) at 03:04

## 2018-04-20 RX ADMIN — INSULIN ASPART 3 UNITS: 100 INJECTION, SOLUTION INTRAVENOUS; SUBCUTANEOUS at 12:04

## 2018-04-20 RX ADMIN — SODIUM CHLORIDE 500 ML: 9 INJECTION, SOLUTION INTRAVENOUS at 08:04

## 2018-04-20 RX ADMIN — HEPARIN SODIUM 5000 UNITS: 5000 INJECTION, SOLUTION INTRAVENOUS; SUBCUTANEOUS at 09:04

## 2018-04-20 RX ADMIN — INSULIN ASPART 4 UNITS: 100 INJECTION, SOLUTION INTRAVENOUS; SUBCUTANEOUS at 05:04

## 2018-04-20 RX ADMIN — PANTOPRAZOLE SODIUM 40 MG: 40 GRANULE, DELAYED RELEASE ORAL at 08:04

## 2018-04-20 RX ADMIN — INSULIN ASPART 3 UNITS: 100 INJECTION, SOLUTION INTRAVENOUS; SUBCUTANEOUS at 03:04

## 2018-04-20 RX ADMIN — INSULIN ASPART 3 UNITS: 100 INJECTION, SOLUTION INTRAVENOUS; SUBCUTANEOUS at 05:04

## 2018-04-20 RX ADMIN — INSULIN ASPART 3 UNITS: 100 INJECTION, SOLUTION INTRAVENOUS; SUBCUTANEOUS at 09:04

## 2018-04-20 RX ADMIN — Medication 12.5 MG: at 09:04

## 2018-04-20 RX ADMIN — DEXMEDETOMIDINE HYDROCHLORIDE 1 MCG/KG/HR: 4 INJECTION, SOLUTION INTRAVENOUS at 03:04

## 2018-04-20 RX ADMIN — CLOPIDOGREL 75 MG: 75 TABLET, FILM COATED ORAL at 08:04

## 2018-04-20 RX ADMIN — IPRATROPIUM BROMIDE AND ALBUTEROL SULFATE 3 ML: .5; 3 SOLUTION RESPIRATORY (INHALATION) at 08:04

## 2018-04-20 RX ADMIN — HYDROXYCHLOROQUINE SULFATE 400 MG: 200 TABLET, FILM COATED ORAL at 08:04

## 2018-04-20 RX ADMIN — METHYLPREDNISOLONE SODIUM SUCCINATE 80 MG: 125 INJECTION, POWDER, FOR SOLUTION INTRAMUSCULAR; INTRAVENOUS at 08:04

## 2018-04-20 RX ADMIN — POTASSIUM & SODIUM PHOSPHATES POWDER PACK 280-160-250 MG 2 PACKET: 280-160-250 PACK at 04:04

## 2018-04-20 RX ADMIN — DEXMEDETOMIDINE HYDROCHLORIDE 0.2 MCG/KG/HR: 4 INJECTION, SOLUTION INTRAVENOUS at 11:04

## 2018-04-20 RX ADMIN — POTASSIUM & SODIUM PHOSPHATES POWDER PACK 280-160-250 MG 2 PACKET: 280-160-250 PACK at 12:04

## 2018-04-20 RX ADMIN — IPRATROPIUM BROMIDE AND ALBUTEROL SULFATE 3 ML: .5; 3 SOLUTION RESPIRATORY (INHALATION) at 07:04

## 2018-04-20 RX ADMIN — Medication 20 MCG/HR: at 01:04

## 2018-04-20 RX ADMIN — MINERAL OIL AND WHITE PETROLATUM: 150; 830 OINTMENT OPHTHALMIC at 10:04

## 2018-04-20 RX ADMIN — HEPARIN SODIUM 5000 UNITS: 5000 INJECTION, SOLUTION INTRAVENOUS; SUBCUTANEOUS at 01:04

## 2018-04-20 RX ADMIN — DEXTROSE: 5 SOLUTION INTRAVENOUS at 04:04

## 2018-04-20 NOTE — PHYSICIAN QUERY
PT Name: Selma Lux MD  MR #: [id number]    Physician Query Form -Present on Admission (POA) Diagnosis Clarification     CDS/: Doretha Gallo RN CDI      Contact information: [email or web address]    This form is a permanent document in the medical record.     Query Date: April 20, 2018    By submitting this query, we are merely seeking further clarification of documentation. Please utilize your independent clinical judgment when addressing the question(s) below.       The Medical record contains the following:    Diagnosis      Supporting Clinical Information   Location in Medical Record     Sepsis       Medical Decision Making:   Initial Assessment:   Worsening cough/dyspnea, no cardiac hx, immunocompromised.    R/o pna, chf,   sepsis,  anemia, other.  Labs, ekg, cxr, oxygen, monitor, re-eval.   ER MD Note     · Acute Respiratory Failure with Hypoxia  · Pneumonia likely due to Pneumococcus  · · 3/4 SIRS criteria:  HR >90, RR >20, WBC >12  · · Satting 97% on 2L NC  · · Check blood cultures  · · Duonebs q4h while awake and prn  · · Guaifenesin and Codeine cough syrup prn cough and Tesslon Perles  · · Refused Pneumonia vaccine previously  · · Continue Ceftriaxone 1g IV daily  · · Continue Azithromycin 250mg PO daily  ·  H&P      Pneumonia due to Streptococcus pneumoniae      WBC  4/5 = 23.59  4/6 = 24.21  4/8 = 32.11  4/10 = 30.05  4/12 = 23.38  4/14 = 21.59  4/16 = 19.56  4/18 = 19.88  4/20 = 0253    Lactate 4/5 = 1.4               4/11 = 1.5    Procalcitonin 4/5 = 0.75     Progress note 4/6 359 pm    Lab results    ID  Septic shock    --2/2 pneumonia, likely component of iatrogenic given amount of sedation need to keep synchronous with ventilator  --hold on 30 ml/kg bolus given degree of hypoxemia, and prior to intubation was not in shock  --start levophed and titrate to MAP >65  --Abx as above   Critical care   Nurse practitioner  4/11 340 pm    Ceftriaxone 1 gram IV 4/5 1957  Azithromycin 500 mg IV 4/5  "2115                        250 mg po 4/6 0900 - 4/9 1001  Ampicillin 3 grams IV every 6 hours 4/10 1134 - 4/11 1213 Medication sheets         Doctor, Please specify Present On Admission (POA) status of "Sepsis".    [ X ] Present on Admission   [  ] Not Present on Admission  [  ] Clinically undetermined            "

## 2018-04-20 NOTE — PROGRESS NOTES
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 She was seen by her PCP on 4/2 for these complaints and received a prescription for duo nebs and tessalon perles. CXR performed on 4/2 noted bilateral basilar infiltrates. At that time, she denied fever, chills, sputum production, sick contacts. She was admitted to  and treated emprically for CAP with rocephin and azithromycin. She was also diuresed daily. However, her oxygen requirements slowly increased from 2 L NC to 4 L NC with also progressively worsening bilateral infiltrates on imaging. CTA performed on 4/9 negative for PE, however noted significant bilateral consolidations. On 4/11, her hypoxemia continued to worsen, requiring NRB mask, and she developed severe respiratory distress. She was transferred to the MICU and emergently intubated. Repeat CT chest performed on 4/11 demonstrated progressive worsening of bilateral peripheral infiltrates.    Hospital/ICU Course:  Admitted to MICU on 4/11, intubated emergently for severe hypoxemia and respiratory distress. ID was consulted for assistance in management given immunocompromised state. Patient paralyzed with nimbex following persistent dysynchrony with vent despite sedation. Abx broadened to vancomycin, zosyn, bactrim, and posaconazole. Norepinephrine initiated for BP support.  Bronchoscopy performed at bedside on 4/11 and all cultures NGTD; pending official fungal culture results. Nimbex was discontinued on 4/12. She continued to require significant oxygen support thereafter, and was unable to wean oxygen requirements significantly since intubation. However, she has made slow improvements in her oxygenation since 4/18. She went into A-fib RVR in 120-160's on the evening of 4/17, and was briefly started on amiodarone gtt; this discontinued and metoprolol added with good rate control.     Interval History/Significant Events: no acute events. Does become tachypenic when weaned from fentanyl gtt, so low dose fentanyl added back. Will follow commands.     Review of Systems   Unable to perform ROS: Intubated     Objective:     Vital Signs (Most Recent):  Temp: 99.4 °F (37.4 °C) (04/20/18 1100)  Pulse: 98 (04/20/18 1117)  Resp: (!) 35 (04/20/18 1117)  BP: 138/63 (04/20/18 1100)  SpO2: 95 % (04/20/18 1117) Vital Signs (24h Range):  Temp:  [98.1 °F (36.7 °C)-99.4 °F (37.4 °C)] 99.4 °F (37.4 °C)  Pulse:  [] 98  Resp:  [10-43] 35  SpO2:  [92 %-98 %] 95 %  BP: (128-166)/(60-75) 138/63  Arterial Line BP: (122-169)/(48-75) 137/55   Weight: 71.5 kg (157 lb 10.1 oz)  Body mass index is 26.23 kg/m².      Intake/Output Summary (Last 24 hours) at 04/20/18 1216  Last data filed at 04/20/18 1100   Gross per 24 hour   Intake           1377.3 ml   Output             1369 ml   Net              8.3 ml       Physical Exam   Constitutional: She appears ill. No distress. She is intubated.   HENT:   Head: Normocephalic and atraumatic.   Eyes: Conjunctivae are normal. Right eye exhibits no discharge and no exudate. Left eye exhibits no discharge and no exudate. No scleral icterus. Right eye exhibits no nystagmus. Left eye exhibits no nystagmus.   Neck: No tracheal deviation present.   Cardiovascular: Normal rate, normal heart sounds and intact distal pulses.  An irregularly irregular rhythm present. PMI is not  displaced.    No murmur heard.  Pulses:       Radial pulses are 2+ on the right side, and 2+ on the left side.        Dorsalis pedis pulses are 2+ on the right side, and 2+ on the left side.   Warm extremities  1+ bilateral UE's edema, pitting   Pulmonary/Chest: Effort normal. No accessory muscle usage. Tachypnea noted. She is intubated. No respiratory distress. She has no decreased breath sounds. She has no wheezes. She has no rhonchi. She has rales in the right middle field and the left middle field.   Abdominal: Soft. Normal appearance and bowel sounds are normal.   Lymphadenopathy:     She has no cervical adenopathy.   Neurological: GCS eye subscore is 3. GCS verbal subscore is 1. GCS motor subscore is 5.   Pupils 3 mm equal, round and reactive to light.  No gaze deviation, nystagmus or roving eye movements.  +cough and gag reflex. Breathing over vent  Will open eyes voice, but eyes close within 2 seconds. Diffuse weakness in all four extremities, but will squeeze hands to command and move toes to command   Skin: Skin is warm, dry and intact. She is not diaphoretic.       Vents:  Vent Mode: A/C (04/20/18 1117)  Set Rate: 16 bmp (04/20/18 1117)  Vt Set: 375 mL (04/20/18 1117)  Pressure Support: 0 cmH20 (04/20/18 1117)  PEEP/CPAP: 5 cmH20 (04/20/18 1117)  Oxygen Concentration (%): 30 (04/20/18 1117)  Peak Airway Pressure: 30 cmH2O (04/20/18 1117)  Plateau Pressure: 19 cmH20 (04/20/18 1117)  Total Ve: 13 mL (04/20/18 1117)  F/VT Ratio<105 (RSBI): (!) 90.67 (04/20/18 1117)  Lines/Drains/Airways     Central Venous Catheter Line                 Percutaneous Central Line Insertion/Assessment - triple lumen  04/11/18 1513 right subclavian 8 days          Drain                 NG/OG Tube 04/11/18 1340 Left mouth 8 days         Rectal Tube 04/16/18 0600 rectal tube w/ balloon (indicate number of mLs) 4 days    Female External Urinary Catheter 04/19/18 1400 less than 1 day          Airway                 Airway -  Non-Surgical 04/11/18 1337 Endotracheal Tube 8 days          Arterial Line                 Arterial Line 04/11/18 1540 Right Radial 8 days              Significant Labs:    CBC/Anemia Profile:    Recent Labs  Lab 04/19/18  0244 04/20/18  0253   WBC 21.52* 29.61*   HGB 9.1* 10.0*   HCT 29.5* 34.3*   * 464*   MCV 79* 80*   RDW 17.9* 18.6*        Chemistries:    Recent Labs  Lab 04/19/18  0244 04/19/18  0245 04/19/18  2101 04/20/18  0253 04/20/18  0757   NA  --  152* 154*  --  157*   K  --  4.3 3.5  --  4.0   CL  --  111* 113*  --  115*   CO2  --  32* 33*  --  33*   BUN  --  68* 60*  --  61*   CREATININE  --  1.2 1.2  --  1.1   CALCIUM  --  8.4* 8.5*  --  8.8   ALBUMIN 1.8* 1.8*  --  2.0*  --    PROT 6.4 6.5  --  6.6  --    BILITOT 0.5 0.5  --  0.7  --    ALKPHOS 66 63  --  81  --    ALT 14 13  --  12  --    AST 17 17  --  11  --    MG 2.6  --   --  2.3  --    PHOS 3.0  --   --  2.6*  --        All pertinent labs within the past 24 hours have been reviewed.    Significant Imaging:  I have reviewed and interpreted all pertinent imaging results/findings within the past 24 hours.    Assessment/Plan:     Neuro   Cerebrovascular accident (CVA)    --Remote lacunar infarcts in the right centrum semi-ovale, basal ganglia, and right tatyana per CTH w/o contrast on 12/2017.  --Continue home clopidogrel.        Encephalopathy, metabolic    --suspect secondary to sedatives for ventilator dyssynchrony and critical illness.    --stop precedex and fentanyl for daily SAT's   --minimize sedation, opiates, deliriogenic medication, however appears to be possibly withdrawing from fentanyl, as when it is withheld, she is tachycardic/tachypenic/HTN. Wean as tolerated           Pulmonary   * Acute hypoxemic respiratory failure    --Suspect secondary to severe bilateral pneumonia in the setting of immunosuppression; possibly  vs reaction from Humira   --Severely worsened over first week of admission despite CAP treatment.  --s/p  emergent intubation on 4/11 upon MICU admission for severe hypoxemia and severe respiratory distress.  --Continue mechanical ventilation, wean support as tolerated.  --Bronchoscopy on 4/11 with wash; culture with few WBCs, no organisms.  --Antibiotics broadened to vancomycin, zosyn, bactrim, and posaconazole initally; appreciate ID assistance with management. S/p week course of zosyn (stop date 4/19). Continue posiconazole for now. Atovoquone for PCP prophylaxis  --Blood, sputum, and urine cultures negative from 4/10.   --KOH prep negative, AFB stain with no acid fast bacilli noted, fungal culture pending  --Aspergillus, Histoplasma, and Cryptococcal antigen in blood is negative. Legionella and blasomyces negative.   --Continue IV steroids for possibility of            Pneumonia of both upper lobes due to infectious organism    --see above        ARDS (adult respiratory distress syndrome)    --see above        Cardiac/Vascular   Hypertension, essential    --continue to hold htn meds as patient normotensive        Atrial fibrillation with RVR    --noted on 4/17. New this admission  --continue metoprolol for rate control.   --CHADS Vasc score is 7; will discuss anticoagulation, but likely would defer until clinical picture more stable           Renal/   Metabolic alkalosis    --trend. Hold additional diuretics         Hypernatremia    --increase free water boluses to 400 q6h  --add D5W ggt at 125 ml/hr for 8 hours  --trend BMP's.         Endocrine   Thyroid enlargement    --History of Hashimoto's thyroiditis.  --Intrathoracic extension on CT.  --TSH and free T4 wnl.          Hyperglycemia    --Likely steroid-induced.  --transitioned from gtt to SQ insulin.  --Goal -180.        GI   PUD (peptic ulcer disease)    --Continue PPI.        Orthopedic   Seropositive rheumatoid arthritis of multiple sites    --Appreciate Rheumatology assistance.  --Continue hydroxychloroquine.  --Hold home prednisone; switched to  IV SoluMedrol 80mg Q12h.        Other   Long term (current) use of systemic steroids    --see above          Critical Care Time: 50 minutes  Critical secondary to Patient has a condition that poses threat to life and bodily function: hypoxemic respiratory failure      Critical care was time spent personally by me on the following activities: development of treatment plan with patient or surrogate and bedside caregivers, discussions with consultants, evaluation of patient's response to treatment, examination of patient, ordering and performing treatments and interventions, ordering and review of laboratory studies, ordering and review of radiographic studies, pulse oximetry, re-evaluation of patient's condition. This critical care time did not overlap with that of any other provider or involve time for any procedures.     Carina Rodney NP  Critical Care Medicine  [facility]

## 2018-04-20 NOTE — PHYSICIAN QUERY
PT Name: Selma Lux MD  MR #: [id number]    Physician Query Form - Atrial Fibrillation Specificity     CDS/: Doretha Gallo RN CDI     Contact information: [email or web address]     This form is a permanent document in the medical record.     Query Date: April 20, 2018    By submitting this query, we are merely seeking further clarification of documentation. Please utilize your independent clinical judgment when addressing the question(s) below.    The medical record contains the following:   Indicators     Supporting Clinical Findings Location in Medical Record   X Atrial Fibrillation Notified by RN of new Afib RVR. At bedside to find patient with 's. EKG afib. Not hypotensive. Diuresed about 3L throughout day. Poor ventilatory synchrony and RR 40's.    4/17 1123 pm    EKG results     X Medication Metoprolol 5 mg IV once 4/17 1805                 2.5 mg IV once 4/17 1812, 1914                12.5 mg po 2 times daily 4/17 2100, 4/18                                1354, 2100  Amiodarone bolus 150 mg IV 4/17 2002                       Drip 1 mg/min4/17 2015, 4/18 0217                       Drip 0.5 mg IV 4/18 0215, 1800                Medication sheets    Treatment      Other         Provider, please further specify the Atrial Fibrillation diagnosis.    [  ] Chronic  [ X ] Paroxysmal  [  ] Permanent  [  ] Persistent  [  ] Other (please specify): ____________________________  [  ] Clinically Undetermined    Please document in your progress notes daily for the duration of treatment until resolved, and include in your discharge summary.

## 2018-04-20 NOTE — SUBJECTIVE & OBJECTIVE
Interval History:  Exutabed, still with cough    Review of Systems   Constitutional: Negative for activity change, chills and fever.   HENT: Negative for congestion, mouth sores, rhinorrhea, sinus pressure and sore throat.    Eyes: Negative for photophobia, pain and redness.   Respiratory: Positive for cough. Negative for chest tightness, shortness of breath and wheezing.    Cardiovascular: Negative for chest pain and leg swelling.   Gastrointestinal: Negative for abdominal distention, abdominal pain, diarrhea, nausea and vomiting.   Endocrine: Negative for polyuria.   Genitourinary: Negative for decreased urine volume, dysuria and flank pain.   Musculoskeletal: Negative for joint swelling and neck pain.   Skin: Negative for color change.   Allergic/Immunologic: Negative for food allergies.   Neurological: Negative for dizziness, weakness and headaches.   Hematological: Negative for adenopathy.   Psychiatric/Behavioral: Negative for agitation and confusion. The patient is not nervous/anxious.      Objective:     Vital Signs (Most Recent):  Temp: 99.2 °F (37.3 °C) (04/20/18 0700)  Pulse: 110 (04/20/18 0800)  Resp: (!) 35 (04/20/18 0800)  BP: 136/60 (04/20/18 0800)  SpO2: (!) 93 % (04/20/18 0800) Vital Signs (24h Range):  Temp:  [98.1 °F (36.7 °C)-99.2 °F (37.3 °C)] 99.2 °F (37.3 °C)  Pulse:  [] 110  Resp:  [10-47] 35  SpO2:  [91 %-98 %] 93 %  BP: (128-166)/(60-74) 136/60  Arterial Line BP: (122-183)/(48-75) 124/52     Weight: 71.5 kg (157 lb 10.1 oz)  Body mass index is 26.23 kg/m².    Estimated Creatinine Clearance: 40.4 mL/min (based on SCr of 1.1 mg/dL).    Physical Exam   Constitutional: She is oriented to person, place, and time. She appears well-developed and well-nourished. No distress.   Intubated/sedated.   HENT:   Head: Normocephalic and atraumatic.   Mouth/Throat: Oropharynx is clear and moist. No oropharyngeal exudate.   Eyes: Conjunctivae and EOM are normal. Pupils are equal, round, and reactive to  light. No scleral icterus.   Neck: Normal range of motion. Neck supple.   Cardiovascular: Normal rate and regular rhythm.  Exam reveals no friction rub.    No murmur heard.  Pulmonary/Chest: Effort normal. No respiratory distress. She has no wheezes. She has rales. She exhibits no tenderness.   Abdominal: Soft. Bowel sounds are normal. She exhibits no distension. There is no tenderness. There is no rebound and no guarding.   Musculoskeletal: Normal range of motion. She exhibits no edema or tenderness.   Lymphadenopathy:     She has no cervical adenopathy.   Neurological: She is alert and oriented to person, place, and time. Coordination normal.   .   Skin: Skin is warm and dry. No rash noted. No erythema.   Psychiatric: She has a normal mood and affect. Her behavior is normal.       Significant Labs:   CBC:     Recent Labs  Lab 04/19/18  0244 04/20/18  0253   WBC 21.52* 29.61*   HGB 9.1* 10.0*   HCT 29.5* 34.3*   * 464*     CMP:     Recent Labs  Lab 04/19/18  0244 04/19/18  0245 04/19/18  2101 04/20/18  0253 04/20/18  0757   NA  --  152* 154*  --  157*   K  --  4.3 3.5  --  4.0   CL  --  111* 113*  --  115*   CO2  --  32* 33*  --  33*   GLU  --  170* 133*  --  171*   BUN  --  68* 60*  --  61*   CREATININE  --  1.2 1.2  --  1.1   CALCIUM  --  8.4* 8.5*  --  8.8   PROT 6.4 6.5  --  6.6  --    ALBUMIN 1.8* 1.8*  --  2.0*  --    BILITOT 0.5 0.5  --  0.7  --    ALKPHOS 66 63  --  81  --    AST 17 17  --  11  --    ALT 14 13  --  12  --    ANIONGAP  --  9 8  --  9   EGFRNONAA  --  42.8* 42.8*  --  47.5*       Significant Imaging: I have reviewed all pertinent imaging results/findings within the past 24 hours.     CT chest:  1. Progression of bilateral patchy and confluent pulmonary consolidation compatible with pneumonia less likely edema or noninfectious inflammation.  There is air noted throughout the esophagus.  2. Stable markedly enlarged right thyroid lobe with large hypodense nodule measuring 3.2 x 2.4 x 4.7  cm.  3. Right renal cyst.  4. Enlarged lobular uterus with calcifications compatible with fibroids.  5. Additional findings as above.     Microbiology:  4/5 blood cx negative  4/5 urine cx negative  4/10 C.diff negative  4/11 blood cx negative  4/11 BAL (, 51% seg, 35% L, 4% eos cx with few yeast, cytology with acute inflammation; GMS negative, ASp ag neg  4/11 aspergillus antigen negative  4/11 fungitell negative  4/11 urine histo negative  4/11 urine blasto negative  4/11 serum crypto negative  4/11 RVP pending  4/11 urine legionella negative

## 2018-04-20 NOTE — PROGRESS NOTES
Pt became agitated and continuing to cough. PRN Fentanyl given at 1740 and pt able to calm down. Around 1840 noted that pt was more tachypneic and breathing over the ventilator 40 times/minute. She was becoming more diaphoretic as well with some accessory muscle use. Spoke with Dr Almonte to notify. Verbal order to administer another 25 mcg Fentanyl at this time and if no improvement to call MD and obtain a chest xray and have them come to bedside to assess pt.     1720: Pt remains tachypneic (44 breaths/min)  and is now diaphragmatically  breathing. She is becoming more diaphoretic. Called [facility] MD to notify and explained what Dr Almonte had said earlier. MD will come to bedside. Monitoring closely.

## 2018-04-20 NOTE — ASSESSMENT & PLAN NOTE
--suspect secondary to sedatives for ventilator dyssynchrony and critical illness.    --stop precedex and fentanyl for daily SAT's   --minimize sedation, opiates, deliriogenic medication, however appears to be possibly withdrawing from fentanyl, as when it is withheld, she is tachycardic/tachypenic/HTN. Wean as tolerated

## 2018-04-20 NOTE — SUBJECTIVE & OBJECTIVE
Interval History/Significant Events: no acute events. Does become tachypenic when weaned from fentanyl gtt, so low dose fentanyl added back. Will follow commands.     Review of Systems   Unable to perform ROS: Intubated     Objective:     Vital Signs (Most Recent):  Temp: 99.4 °F (37.4 °C) (04/20/18 1100)  Pulse: 98 (04/20/18 1117)  Resp: (!) 35 (04/20/18 1117)  BP: 138/63 (04/20/18 1100)  SpO2: 95 % (04/20/18 1117) Vital Signs (24h Range):  Temp:  [98.1 °F (36.7 °C)-99.4 °F (37.4 °C)] 99.4 °F (37.4 °C)  Pulse:  [] 98  Resp:  [10-43] 35  SpO2:  [92 %-98 %] 95 %  BP: (128-166)/(60-75) 138/63  Arterial Line BP: (122-169)/(48-75) 137/55   Weight: 71.5 kg (157 lb 10.1 oz)  Body mass index is 26.23 kg/m².      Intake/Output Summary (Last 24 hours) at 04/20/18 1216  Last data filed at 04/20/18 1100   Gross per 24 hour   Intake           1377.3 ml   Output             1369 ml   Net              8.3 ml       Physical Exam   Constitutional: She appears ill. No distress. She is intubated.   HENT:   Head: Normocephalic and atraumatic.   Eyes: Conjunctivae are normal. Right eye exhibits no discharge and no exudate. Left eye exhibits no discharge and no exudate. No scleral icterus. Right eye exhibits no nystagmus. Left eye exhibits no nystagmus.   Neck: No tracheal deviation present.   Cardiovascular: Normal rate, normal heart sounds and intact distal pulses.  An irregularly irregular rhythm present. PMI is not displaced.    No murmur heard.  Pulses:       Radial pulses are 2+ on the right side, and 2+ on the left side.        Dorsalis pedis pulses are 2+ on the right side, and 2+ on the left side.   Warm extremities  1+ bilateral UE's edema, pitting   Pulmonary/Chest: Effort normal. No accessory muscle usage. Tachypnea noted. She is intubated. No respiratory distress. She has no decreased breath sounds. She has no wheezes. She has no rhonchi. She has rales in the right middle field and the left middle field.   Abdominal:  Soft. Normal appearance and bowel sounds are normal.   Lymphadenopathy:     She has no cervical adenopathy.   Neurological: GCS eye subscore is 3. GCS verbal subscore is 1. GCS motor subscore is 5.   Pupils 3 mm equal, round and reactive to light.  No gaze deviation, nystagmus or roving eye movements.  +cough and gag reflex. Breathing over vent  Will open eyes voice, but eyes close within 2 seconds. Diffuse weakness in all four extremities, but will squeeze hands to command and move toes to command   Skin: Skin is warm, dry and intact. She is not diaphoretic.       Vents:  Vent Mode: A/C (04/20/18 1117)  Set Rate: 16 bmp (04/20/18 1117)  Vt Set: 375 mL (04/20/18 1117)  Pressure Support: 0 cmH20 (04/20/18 1117)  PEEP/CPAP: 5 cmH20 (04/20/18 1117)  Oxygen Concentration (%): 30 (04/20/18 1117)  Peak Airway Pressure: 30 cmH2O (04/20/18 1117)  Plateau Pressure: 19 cmH20 (04/20/18 1117)  Total Ve: 13 mL (04/20/18 1117)  F/VT Ratio<105 (RSBI): (!) 90.67 (04/20/18 1117)  Lines/Drains/Airways     Central Venous Catheter Line                 Percutaneous Central Line Insertion/Assessment - triple lumen  04/11/18 1513 right subclavian 8 days          Drain                 NG/OG Tube 04/11/18 1340 Left mouth 8 days         Rectal Tube 04/16/18 0600 rectal tube w/ balloon (indicate number of mLs) 4 days    Female External Urinary Catheter 04/19/18 1400 less than 1 day          Airway                 Airway - Non-Surgical 04/11/18 1337 Endotracheal Tube 8 days          Arterial Line                 Arterial Line 04/11/18 1540 Right Radial 8 days              Significant Labs:    CBC/Anemia Profile:    Recent Labs  Lab 04/19/18  0244 04/20/18  0253   WBC 21.52* 29.61*   HGB 9.1* 10.0*   HCT 29.5* 34.3*   * 464*   MCV 79* 80*   RDW 17.9* 18.6*        Chemistries:    Recent Labs  Lab 04/19/18  0244 04/19/18  0245 04/19/18  2101 04/20/18  0253 04/20/18  0757   NA  --  152* 154*  --  157*   K  --  4.3 3.5  --  4.0   CL  --   111* 113*  --  115*   CO2  --  32* 33*  --  33*   BUN  --  68* 60*  --  61*   CREATININE  --  1.2 1.2  --  1.1   CALCIUM  --  8.4* 8.5*  --  8.8   ALBUMIN 1.8* 1.8*  --  2.0*  --    PROT 6.4 6.5  --  6.6  --    BILITOT 0.5 0.5  --  0.7  --    ALKPHOS 66 63  --  81  --    ALT 14 13  --  12  --    AST 17 17  --  11  --    MG 2.6  --   --  2.3  --    PHOS 3.0  --   --  2.6*  --        All pertinent labs within the past 24 hours have been reviewed.    Significant Imaging:  I have reviewed and interpreted all pertinent imaging results/findings within the past 24 hours.

## 2018-04-20 NOTE — PLAN OF CARE
Problem: Patient Care Overview  Goal: Plan of Care Review  Outcome: Revised  POC reviewed with patient, patient daughter, & patient sister at bedside. SAT trial performed today; patient anxious while weaning fentanyl & awakening. Patient became diaphoretic, tachypnic (30-50's), tachycardic, & BP increased. Precedex infusion added to help maintain patient comfort while also weaning from Fentanyl. Post Precedex addition patient calmer, no longer biting on tube/diaphoretic, NSR, BP WNL, however still tachypnic (30'). Will decrease Fentanyl as tolerated & increase Precedex to maintain comfort in hopes of extubation 04/21/18. Music therapy implemented throughout shift to further promote comfort and relaxation while weaning sedation. D5 infusion given-- Na now within goal. No skin breakdown noted during shift; mepilex on sacrum & heels to protect pressure points, barrier cream applied to buttocks around mepilex, moon boots & SCDs implemented. Patient tolerating tube feedings @ goal rate. No residuals upon assessment. 400 cc water boluses given Q6H. Patient responding to voice & following commands during shift. Sister to remain at bedside. Will continue to monitor.

## 2018-04-20 NOTE — PROGRESS NOTES
-0753: Fentanyl off. SAT started.  RR: 34 /69, ANA BP: 120/52, HR: 107, SpO2: 94  -09:04: RR: 40 /75; Ana /65 (95), , SpO2: 94%  -0953: RR: 42, SpO2: 94%. KAMLA Lim updated. Ordered restart Fentanyl@ 12.5mcg/hr. Telephone readback x2. Orders implemented. Patient and daughter updated on POC  -10:40: RR ~38-40 post Fentanyl restart @ 12.5mcg/hr. KAMLA Lim updated. Ordered increase dose 25mcg/hr.   13:10: Fentanyl decreased to 20 mcg/hr per KAMLA Lim verbal order. RR: 28-30. TV: 402. SpO2: 95%, BP: 141/66, ANA /46, HR: 93. Patient light sedation  1400: prn Hydralazine given for elevated BP. Patient BP still elevated, 's RR: 40's, biting bite block. Prn Fentanyl given. KAMLA Lim updated- ordered increase Fentanyl gtt to 25mcg/hr. Telephone readback x2. Orders implemented  1500: fentantyl 25 mcg bolus given. Patient diaphoretic. RR 40-50. 's. Elevated BP. Biting on bite block. KAMLA Lim to bedside.   1518: 50mcg/hr Fentanyl bolus given per NP order while awaiting Precedix infusion

## 2018-04-20 NOTE — PLAN OF CARE
Problem: Patient Care Overview  Goal: Plan of Care Review  Outcome: Ongoing (interventions implemented as appropriate)  POC reviewed with patient and daughter at the bedside. All questions from the daughter addressed/answered. Review flowsheets for full assessment and vitals.    - Pt. Back on fentanyl gtt for ventilator comfort  -Pt. Following commands off sedation  -Tube Feed at goal rate of 40ml/hr  -Breathing trial planned for this AM  -Flexi, OG ,ETT & Purwick all in proper place

## 2018-04-21 LAB
ALBUMIN SERPL BCP-MCNC: 1.7 G/DL
ALLENS TEST: ABNORMAL
ALLENS TEST: ABNORMAL
ALP SERPL-CCNC: 69 U/L
ALT SERPL W/O P-5'-P-CCNC: 9 U/L
ANION GAP SERPL CALC-SCNC: 10 MMOL/L
ANION GAP SERPL CALC-SCNC: 5 MMOL/L
ANION GAP SERPL CALC-SCNC: 9 MMOL/L
ANISOCYTOSIS BLD QL SMEAR: SLIGHT
ANISOCYTOSIS BLD QL SMEAR: SLIGHT
AST SERPL-CCNC: 9 U/L
BACTERIA SPEC AEROBE CULT: NO GROWTH
BASOPHILS # BLD AUTO: 0.01 K/UL
BASOPHILS # BLD AUTO: 0.02 K/UL
BASOPHILS NFR BLD: 0 %
BASOPHILS NFR BLD: 0.1 %
BILIRUB DIRECT SERPL-MCNC: 0.3 MG/DL
BILIRUB SERPL-MCNC: 0.5 MG/DL
BUN SERPL-MCNC: 67 MG/DL
BUN SERPL-MCNC: 68 MG/DL
BUN SERPL-MCNC: 69 MG/DL
CALCIUM SERPL-MCNC: 8.3 MG/DL
CALCIUM SERPL-MCNC: 8.4 MG/DL
CALCIUM SERPL-MCNC: 8.5 MG/DL
CHLORIDE SERPL-SCNC: 112 MMOL/L
CHLORIDE SERPL-SCNC: 115 MMOL/L
CHLORIDE SERPL-SCNC: 116 MMOL/L
CO2 SERPL-SCNC: 28 MMOL/L
CO2 SERPL-SCNC: 30 MMOL/L
CO2 SERPL-SCNC: 31 MMOL/L
CREAT SERPL-MCNC: 1.1 MG/DL
CREAT SERPL-MCNC: 1.1 MG/DL
CREAT SERPL-MCNC: 1.2 MG/DL
DACRYOCYTES BLD QL SMEAR: ABNORMAL
DELSYS: ABNORMAL
DELSYS: ABNORMAL
DIFFERENTIAL METHOD: ABNORMAL
DIFFERENTIAL METHOD: ABNORMAL
EOSINOPHIL # BLD AUTO: 0 K/UL
EOSINOPHIL # BLD AUTO: 0 K/UL
EOSINOPHIL NFR BLD: 0 %
EOSINOPHIL NFR BLD: 0.1 %
ERYTHROCYTE [DISTWIDTH] IN BLOOD BY AUTOMATED COUNT: 18.7 %
ERYTHROCYTE [DISTWIDTH] IN BLOOD BY AUTOMATED COUNT: 18.8 %
ERYTHROCYTE [SEDIMENTATION RATE] IN BLOOD BY WESTERGREN METHOD: 16 MM/H
EST. GFR  (AFRICAN AMERICAN): 49.3 ML/MIN/1.73 M^2
EST. GFR  (AFRICAN AMERICAN): 54.8 ML/MIN/1.73 M^2
EST. GFR  (AFRICAN AMERICAN): 54.8 ML/MIN/1.73 M^2
EST. GFR  (NON AFRICAN AMERICAN): 42.8 ML/MIN/1.73 M^2
EST. GFR  (NON AFRICAN AMERICAN): 47.5 ML/MIN/1.73 M^2
EST. GFR  (NON AFRICAN AMERICAN): 47.5 ML/MIN/1.73 M^2
FIO2: 30
FIO2: 30
GIANT PLATELETS BLD QL SMEAR: PRESENT
GLUCOSE SERPL-MCNC: 183 MG/DL
GLUCOSE SERPL-MCNC: 230 MG/DL
GLUCOSE SERPL-MCNC: 262 MG/DL
GRAM STN SPEC: NORMAL
HCO3 UR-SCNC: 30.5 MMOL/L (ref 24–28)
HCO3 UR-SCNC: 32.1 MMOL/L (ref 24–28)
HCT VFR BLD AUTO: 30 %
HCT VFR BLD AUTO: 32.2 %
HGB BLD-MCNC: 9 G/DL
HGB BLD-MCNC: 9.3 G/DL
HYPOCHROMIA BLD QL SMEAR: ABNORMAL
HYPOCHROMIA BLD QL SMEAR: ABNORMAL
IMM GRANULOCYTES # BLD AUTO: 0.16 K/UL
IMM GRANULOCYTES # BLD AUTO: 0.25 K/UL
IMM GRANULOCYTES NFR BLD AUTO: 0.7 %
IMM GRANULOCYTES NFR BLD AUTO: 0.9 %
INR PPP: 1.2
LYMPHOCYTES # BLD AUTO: 0.6 K/UL
LYMPHOCYTES # BLD AUTO: 0.8 K/UL
LYMPHOCYTES NFR BLD: 2.3 %
LYMPHOCYTES NFR BLD: 3.4 %
MAGNESIUM SERPL-MCNC: 2.2 MG/DL
MCH RBC QN AUTO: 23.8 PG
MCH RBC QN AUTO: 24.1 PG
MCHC RBC AUTO-ENTMCNC: 28.9 G/DL
MCHC RBC AUTO-ENTMCNC: 30 G/DL
MCV RBC AUTO: 79 FL
MCV RBC AUTO: 83 FL
MODE: ABNORMAL
MODE: ABNORMAL
MONOCYTES # BLD AUTO: 0.7 K/UL
MONOCYTES # BLD AUTO: 2 K/UL
MONOCYTES NFR BLD: 3.2 %
MONOCYTES NFR BLD: 7.1 %
NEUTROPHILS # BLD AUTO: 21 K/UL
NEUTROPHILS # BLD AUTO: 24.6 K/UL
NEUTROPHILS NFR BLD: 89.5 %
NEUTROPHILS NFR BLD: 92.7 %
NRBC BLD-RTO: 0 /100 WBC
NRBC BLD-RTO: 0 /100 WBC
OVALOCYTES BLD QL SMEAR: ABNORMAL
OVALOCYTES BLD QL SMEAR: ABNORMAL
PCO2 BLDA: 35.1 MMHG (ref 35–45)
PCO2 BLDA: 43.2 MMHG (ref 35–45)
PEEP: 5
PEEP: 5
PH SMN: 7.48 [PH] (ref 7.35–7.45)
PH SMN: 7.55 [PH] (ref 7.35–7.45)
PHOSPHATE SERPL-MCNC: 3.8 MG/DL
PLATELET # BLD AUTO: 352 K/UL
PLATELET # BLD AUTO: 375 K/UL
PLATELET BLD QL SMEAR: ABNORMAL
PMV BLD AUTO: 10.8 FL
PMV BLD AUTO: 11.1 FL
PO2 BLDA: 66 MMHG (ref 80–100)
PO2 BLDA: 79 MMHG (ref 80–100)
POC BE: 8 MMOL/L
POC BE: 9 MMOL/L
POC SATURATED O2: 95 % (ref 95–100)
POC SATURATED O2: 96 % (ref 95–100)
POC TCO2: 32 MMOL/L (ref 23–27)
POC TCO2: 33 MMOL/L (ref 23–27)
POCT GLUCOSE: 152 MG/DL (ref 70–110)
POCT GLUCOSE: 180 MG/DL (ref 70–110)
POCT GLUCOSE: 199 MG/DL (ref 70–110)
POCT GLUCOSE: 206 MG/DL (ref 70–110)
POCT GLUCOSE: 226 MG/DL (ref 70–110)
POCT GLUCOSE: 248 MG/DL (ref 70–110)
POCT GLUCOSE: 94 MG/DL (ref 70–110)
POIKILOCYTOSIS BLD QL SMEAR: SLIGHT
POIKILOCYTOSIS BLD QL SMEAR: SLIGHT
POLYCHROMASIA BLD QL SMEAR: ABNORMAL
POLYCHROMASIA BLD QL SMEAR: ABNORMAL
POTASSIUM SERPL-SCNC: 4 MMOL/L
POTASSIUM SERPL-SCNC: 4.2 MMOL/L
POTASSIUM SERPL-SCNC: 4.4 MMOL/L
PROCALCITONIN SERPL IA-MCNC: 0.53 NG/ML
PROT SERPL-MCNC: 5.7 G/DL
PROTHROMBIN TIME: 12.4 SEC
PS: 8
RBC # BLD AUTO: 3.78 M/UL
RBC # BLD AUTO: 3.86 M/UL
SAMPLE: ABNORMAL
SAMPLE: ABNORMAL
SCHISTOCYTES BLD QL SMEAR: ABNORMAL
SCHISTOCYTES BLD QL SMEAR: PRESENT
SITE: ABNORMAL
SITE: ABNORMAL
SODIUM SERPL-SCNC: 148 MMOL/L
SODIUM SERPL-SCNC: 153 MMOL/L
SODIUM SERPL-SCNC: 155 MMOL/L
TARGETS BLD QL SMEAR: ABNORMAL
TARGETS BLD QL SMEAR: ABNORMAL
VT: 375
WBC # BLD AUTO: 22.63 K/UL
WBC # BLD AUTO: 27.5 K/UL
WBC TOXIC VACUOLES BLD QL SMEAR: PRESENT

## 2018-04-21 PROCEDURE — 99900026 HC AIRWAY MAINTENANCE (STAT)

## 2018-04-21 PROCEDURE — 82803 BLOOD GASES ANY COMBINATION: CPT

## 2018-04-21 PROCEDURE — 99900035 HC TECH TIME PER 15 MIN (STAT)

## 2018-04-21 PROCEDURE — 25000003 PHARM REV CODE 250: Performed by: STUDENT IN AN ORGANIZED HEALTH CARE EDUCATION/TRAINING PROGRAM

## 2018-04-21 PROCEDURE — 25000003 PHARM REV CODE 250: Performed by: NURSE PRACTITIONER

## 2018-04-21 PROCEDURE — 63600175 PHARM REV CODE 636 W HCPCS: Performed by: NURSE PRACTITIONER

## 2018-04-21 PROCEDURE — 27000221 HC OXYGEN, UP TO 24 HOURS

## 2018-04-21 PROCEDURE — 85610 PROTHROMBIN TIME: CPT

## 2018-04-21 PROCEDURE — 80048 BASIC METABOLIC PNL TOTAL CA: CPT | Mod: 91

## 2018-04-21 PROCEDURE — 80076 HEPATIC FUNCTION PANEL: CPT

## 2018-04-21 PROCEDURE — 83735 ASSAY OF MAGNESIUM: CPT

## 2018-04-21 PROCEDURE — 37799 UNLISTED PX VASCULAR SURGERY: CPT

## 2018-04-21 PROCEDURE — 20000000 HC ICU ROOM

## 2018-04-21 PROCEDURE — 85025 COMPLETE CBC W/AUTO DIFF WBC: CPT

## 2018-04-21 PROCEDURE — 94761 N-INVAS EAR/PLS OXIMETRY MLT: CPT

## 2018-04-21 PROCEDURE — 25000242 PHARM REV CODE 250 ALT 637 W/ HCPCS: Performed by: INTERNAL MEDICINE

## 2018-04-21 PROCEDURE — 99291 CRITICAL CARE FIRST HOUR: CPT | Mod: ,,, | Performed by: INTERNAL MEDICINE

## 2018-04-21 PROCEDURE — 84100 ASSAY OF PHOSPHORUS: CPT

## 2018-04-21 PROCEDURE — 94003 VENT MGMT INPAT SUBQ DAY: CPT

## 2018-04-21 PROCEDURE — 80048 BASIC METABOLIC PNL TOTAL CA: CPT

## 2018-04-21 PROCEDURE — 25000003 PHARM REV CODE 250: Performed by: INTERNAL MEDICINE

## 2018-04-21 PROCEDURE — 94640 AIRWAY INHALATION TREATMENT: CPT

## 2018-04-21 RX ORDER — DIAZEPAM 2 MG/1
2 TABLET ORAL EVERY 6 HOURS
Status: DISCONTINUED | OUTPATIENT
Start: 2018-04-21 | End: 2018-04-22

## 2018-04-21 RX ORDER — DEXTROSE MONOHYDRATE 50 MG/ML
INJECTION, SOLUTION INTRAVENOUS CONTINUOUS
Status: ACTIVE | OUTPATIENT
Start: 2018-04-21 | End: 2018-04-21

## 2018-04-21 RX ORDER — PHENYLEPHRINE HCL IN 0.9% NACL 1 MG/10 ML
SYRINGE (ML) INTRAVENOUS
Status: COMPLETED
Start: 2018-04-21 | End: 2018-04-21

## 2018-04-21 RX ORDER — PHENYLEPHRINE HCL IN 0.9% NACL 1 MG/10 ML
100 SYRINGE (ML) INTRAVENOUS ONCE
Status: COMPLETED | OUTPATIENT
Start: 2018-04-21 | End: 2018-04-21

## 2018-04-21 RX ORDER — NOREPINEPHRINE BITARTRATE/D5W 4MG/250ML
0.02 PLASTIC BAG, INJECTION (ML) INTRAVENOUS CONTINUOUS
Status: DISCONTINUED | OUTPATIENT
Start: 2018-04-21 | End: 2018-04-21

## 2018-04-21 RX ADMIN — IPRATROPIUM BROMIDE AND ALBUTEROL SULFATE 3 ML: .5; 3 SOLUTION RESPIRATORY (INHALATION) at 03:04

## 2018-04-21 RX ADMIN — STANDARDIZED SENNA CONCENTRATE AND DOCUSATE SODIUM 1 TABLET: 8.6; 5 TABLET, FILM COATED ORAL at 09:04

## 2018-04-21 RX ADMIN — HEPARIN SODIUM 5000 UNITS: 5000 INJECTION, SOLUTION INTRAVENOUS; SUBCUTANEOUS at 09:04

## 2018-04-21 RX ADMIN — IPRATROPIUM BROMIDE AND ALBUTEROL SULFATE 3 ML: .5; 3 SOLUTION RESPIRATORY (INHALATION) at 11:04

## 2018-04-21 RX ADMIN — Medication 12.5 MG: at 10:04

## 2018-04-21 RX ADMIN — INSULIN ASPART 4 UNITS: 100 INJECTION, SOLUTION INTRAVENOUS; SUBCUTANEOUS at 04:04

## 2018-04-21 RX ADMIN — Medication 0.02 MCG/KG/MIN: at 08:04

## 2018-04-21 RX ADMIN — HEPARIN SODIUM 5000 UNITS: 5000 INJECTION, SOLUTION INTRAVENOUS; SUBCUTANEOUS at 02:04

## 2018-04-21 RX ADMIN — MINERAL OIL AND WHITE PETROLATUM: 150; 830 OINTMENT OPHTHALMIC at 06:04

## 2018-04-21 RX ADMIN — METHYLPREDNISOLONE SODIUM SUCCINATE 80 MG: 125 INJECTION, POWDER, FOR SOLUTION INTRAMUSCULAR; INTRAVENOUS at 09:04

## 2018-04-21 RX ADMIN — INSULIN ASPART 2 UNITS: 100 INJECTION, SOLUTION INTRAVENOUS; SUBCUTANEOUS at 01:04

## 2018-04-21 RX ADMIN — CLOPIDOGREL 75 MG: 75 TABLET, FILM COATED ORAL at 08:04

## 2018-04-21 RX ADMIN — Medication 10 MCG/HR: at 12:04

## 2018-04-21 RX ADMIN — CHLORHEXIDINE GLUCONATE 15 ML: 1.2 RINSE ORAL at 08:04

## 2018-04-21 RX ADMIN — INSULIN ASPART 3 UNITS: 100 INJECTION, SOLUTION INTRAVENOUS; SUBCUTANEOUS at 08:04

## 2018-04-21 RX ADMIN — IPRATROPIUM BROMIDE AND ALBUTEROL SULFATE 3 ML: .5; 3 SOLUTION RESPIRATORY (INHALATION) at 07:04

## 2018-04-21 RX ADMIN — PANTOPRAZOLE SODIUM 40 MG: 40 GRANULE, DELAYED RELEASE ORAL at 09:04

## 2018-04-21 RX ADMIN — POSACONAZOLE 400 MG: 40 SUSPENSION ORAL at 08:04

## 2018-04-21 RX ADMIN — Medication 100 MCG: at 08:04

## 2018-04-21 RX ADMIN — INSULIN ASPART 3 UNITS: 100 INJECTION, SOLUTION INTRAVENOUS; SUBCUTANEOUS at 12:04

## 2018-04-21 RX ADMIN — DEXMEDETOMIDINE HYDROCHLORIDE 0.8 MCG/KG/HR: 4 INJECTION, SOLUTION INTRAVENOUS at 06:04

## 2018-04-21 RX ADMIN — DIAZEPAM 2 MG: 2 TABLET ORAL at 05:04

## 2018-04-21 RX ADMIN — INSULIN ASPART 2 UNITS: 100 INJECTION, SOLUTION INTRAVENOUS; SUBCUTANEOUS at 08:04

## 2018-04-21 RX ADMIN — DIAZEPAM 2 MG: 2 TABLET ORAL at 12:04

## 2018-04-21 RX ADMIN — INSULIN ASPART 3 UNITS: 100 INJECTION, SOLUTION INTRAVENOUS; SUBCUTANEOUS at 04:04

## 2018-04-21 RX ADMIN — MINERAL OIL AND WHITE PETROLATUM: 150; 830 OINTMENT OPHTHALMIC at 09:04

## 2018-04-21 RX ADMIN — DEXTROSE: 5 SOLUTION INTRAVENOUS at 08:04

## 2018-04-21 RX ADMIN — METHYLPREDNISOLONE SODIUM SUCCINATE 80 MG: 125 INJECTION, POWDER, FOR SOLUTION INTRAMUSCULAR; INTRAVENOUS at 08:04

## 2018-04-21 RX ADMIN — HYDROXYCHLOROQUINE SULFATE 400 MG: 200 TABLET, FILM COATED ORAL at 09:04

## 2018-04-21 RX ADMIN — ATOVAQUONE 1500 MG: 750 SUSPENSION ORAL at 09:04

## 2018-04-21 RX ADMIN — DEXMEDETOMIDINE HYDROCHLORIDE 0.5 MCG/KG/HR: 4 INJECTION, SOLUTION INTRAVENOUS at 11:04

## 2018-04-21 RX ADMIN — POSACONAZOLE 400 MG: 40 SUSPENSION ORAL at 09:04

## 2018-04-21 RX ADMIN — INSULIN ASPART 2 UNITS: 100 INJECTION, SOLUTION INTRAVENOUS; SUBCUTANEOUS at 09:04

## 2018-04-21 RX ADMIN — CHLORHEXIDINE GLUCONATE 15 ML: 1.2 RINSE ORAL at 09:04

## 2018-04-21 RX ADMIN — DEXMEDETOMIDINE HYDROCHLORIDE 0.2 MCG/KG/HR: 4 INJECTION, SOLUTION INTRAVENOUS at 11:04

## 2018-04-21 RX ADMIN — Medication 12.5 MG: at 09:04

## 2018-04-21 RX ADMIN — HEPARIN SODIUM 5000 UNITS: 5000 INJECTION, SOLUTION INTRAVENOUS; SUBCUTANEOUS at 06:04

## 2018-04-21 NOTE — ASSESSMENT & PLAN NOTE
--Suspect secondary to severe bilateral pneumonia in the setting of immunosuppression; possibly  vs reaction from Humira   --Severely worsened over first week of admission despite CAP treatment.  --s/p emergent intubation on 4/11 upon MICU admission for severe hypoxemia and severe respiratory distress.  --Bronchoscopy on 4/11 with wash; culture with few WBCs, no organisms.  --Antibiotics broadened to vancomycin, zosyn, bactrim, and posaconazole initally; currently continuing posaconazole and atovoquone   --Blood, sputum, and urine cultures negative from 4/10 and 4/18  --KOH prep negative, AFB stain with no acid fast bacilli noted, fungal culture negative  --Aspergillus, Histoplasma, and Cryptococcal antigen in blood is negative. Legionella and blasomyces negative.   --Continue IV steroids for possibility of

## 2018-04-21 NOTE — ASSESSMENT & PLAN NOTE
--possibly ATN from hypoperfusion, bactrim and Vancomycin in conjunction with Pip/Tazo in setting of contrast exposure  --bactrim changed to atovaquone  --lasix challenge on 4/17 with good urine output  --hold further diuresis at this time due to metabolic alkalosis  --neprhology following, appreciate recs  --trend BMP

## 2018-04-21 NOTE — PROGRESS NOTES
0700: patient diaphoretic, tachypnic, tachycardic, biting tube, squirming in bed. -200's Precedex dose increase 1.1; Fentanyl 25mcg bolus given  0715: NP to bedside  0804: Precedex off, Fentanyl off  0844: Levo started, Qamar bump given  0845: SBT started  0905: tube feedings stopped

## 2018-04-21 NOTE — SUBJECTIVE & OBJECTIVE
Interval History/Significant Events: Early this am, she became very agitated and hypertensive, precedex was increased resulting in hypotension requiring twyla bump and brief period on levophed. Precedex decreased and after a period of time, BP returned to normal and levo d/c'd.    Review of Systems   Unable to perform ROS: Intubated     Objective:     Vital Signs (Most Recent):  Temp: 98.4 °F (36.9 °C) (04/21/18 1545)  Pulse: 89 (04/21/18 1545)  Resp: (!) 28 (04/21/18 1545)  BP: (!) 107/55 (04/21/18 1545)  SpO2: 96 % (04/21/18 1545) Vital Signs (24h Range):  Temp:  [98.4 °F (36.9 °C)-98.5 °F (36.9 °C)] 98.4 °F (36.9 °C)  Pulse:  [] 89  Resp:  [0-60] 28  SpO2:  [89 %-100 %] 96 %  BP: ()/(41-98) 107/55  Arterial Line BP: ()/() 97/38   Weight: 71.5 kg (157 lb 10.1 oz)  Body mass index is 26.23 kg/m².      Intake/Output Summary (Last 24 hours) at 04/21/18 1629  Last data filed at 04/21/18 1604   Gross per 24 hour   Intake          3692.46 ml   Output              400 ml   Net          3292.46 ml       Physical Exam   Constitutional: She appears well-developed. She appears lethargic. She does not have a sickly appearance. She is sedated and intubated.   HENT:   Head: Normocephalic and atraumatic.   Eyes: Conjunctivae and EOM are normal. Pupils are equal, round, and reactive to light. Right eye exhibits no exudate. Left eye exhibits no exudate. Right conjunctiva has no hemorrhage. Left conjunctiva has no hemorrhage. No scleral icterus. Right eye exhibits no nystagmus. Left eye exhibits no nystagmus.   Pupils 1mm, and sluggish reaction to light   Neck: Trachea normal. No neck rigidity. No tracheal deviation present.       Cardiovascular: Regular rhythm and normal heart sounds.  Frequent extrasystoles are present. PMI is not displaced.  Exam reveals no gallop and no friction rub.    No murmur heard.  Pulses:       Radial pulses are 2+ on the right side, and 2+ on the left side.        Dorsalis pedis  pulses are 2+ on the right side, and 2+ on the left side.   Pulmonary/Chest: Effort normal and breath sounds normal. Tachypnea noted. She is intubated. No respiratory distress. She has no wheezes. She has no rhonchi. She has no rales.   Breath sounds coarse bilaterally   Abdominal: Soft. Normal appearance and bowel sounds are normal. There is no tenderness.   Genitourinary:   Genitourinary Comments: Hill draining clear, yellow urine   Musculoskeletal: Normal range of motion.   Neurological: She has normal strength. She appears lethargic. GCS eye subscore is 1. GCS verbal subscore is 1. GCS motor subscore is 6.   Follows commands with a delay. Cough and gag reflex intact   Skin: Skin is warm. No cyanosis. Nails show no clubbing.   Nursing note and vitals reviewed.      Vents:  Vent Mode: A/C (04/21/18 1521)  Set Rate: 16 bmp (04/21/18 1521)  Vt Set: 375 mL (04/21/18 1521)  Pressure Support: 0 cmH20 (04/21/18 1521)  PEEP/CPAP: 8 cmH20 (04/21/18 1521)  Oxygen Concentration (%): 30 (04/21/18 1545)  Peak Airway Pressure: 30 cmH2O (04/21/18 1521)  Plateau Pressure: 19 cmH20 (04/21/18 1521)  Total Ve: 11 mL (04/21/18 1521)  F/VT Ratio<105 (RSBI): (!) 70.18 (04/21/18 1521)  Lines/Drains/Airways     Central Venous Catheter Line                 Percutaneous Central Line Insertion/Assessment - triple lumen  04/11/18 1513 right subclavian 10 days          Drain                 NG/OG Tube 04/11/18 1340 Left mouth 10 days         Rectal Tube 04/16/18 0600 rectal tube w/ balloon (indicate number of mLs) 5 days    Female External Urinary Catheter 04/19/18 1400 2 days          Airway                 Airway - Non-Surgical 04/11/18 1337 Endotracheal Tube 10 days          Arterial Line                 Arterial Line 04/11/18 1540 Right Radial 10 days              Significant Labs:    CBC/Anemia Profile:    Recent Labs  Lab 04/20/18  0253 04/20/18  2311 04/21/18  0359   WBC 29.61* 27.50* 22.63*   HGB 10.0* 9.3* 9.0*   HCT 34.3* 32.2*  30.0*   * 375* 352*   MCV 80* 83 79*   RDW 18.6* 18.8* 18.7*        Chemistries:    Recent Labs  Lab 04/20/18  0253  04/20/18  1727 04/21/18  0359 04/21/18  0928   NA  --   < > 152* 153* 155*   K  --   < > 4.5 4.4 4.0   CL  --   < > 114* 115* 116*   CO2  --   < > 30* 28 30*   BUN  --   < > 58* 67* 69*   CREATININE  --   < > 1.1 1.1 1.2   CALCIUM  --   < > 8.2* 8.3* 8.5*   ALBUMIN 2.0*  --   --  1.7*  --    PROT 6.6  --   --  5.7*  --    BILITOT 0.7  --   --  0.5  --    ALKPHOS 81  --   --  69  --    ALT 12  --   --  9*  --    AST 11  --   --  9*  --    MG 2.3  --   --  2.2  --    PHOS 2.6*  --   --  3.8  --    < > = values in this interval not displayed.    All pertinent labs within the past 24 hours have been reviewed.    Significant Imaging:  I have reviewed all pertinent imaging results/findings within the past 24 hours.

## 2018-04-21 NOTE — PLAN OF CARE
Problem: Patient Care Overview  Goal: Plan of Care Review  Outcome: Ongoing (interventions implemented as appropriate)  Pt remains intubated in [facility]ICU. Attempted to wean sedation, but pt bacame very agitated- thrashing head, breathing 40 bpm, HR increased to 120s, BP cuff 200s/90s. Sedation restarted. Pt still restless but is consolable. Total 1 L NS bolus given for hypotension. No other acute events overnight. Plan of care reviewed with Dr. Lux and her sister. All questions and concerns addressed. Will continue to monitor.

## 2018-04-21 NOTE — PROGRESS NOTES
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 She was seen by her PCP on 4/2 for these complaints and received a prescription for duo nebs and tessalon perles. CXR performed on 4/2 noted bilateral basilar infiltrates. At that time, she denied fever, chills, sputum production, sick contacts. She was admitted to  and treated emprically for CAP with rocephin and azithromycin. She was also diuresed daily. However, her oxygen requirements slowly increased from 2 L NC to 4 L NC with also progressively worsening bilateral infiltrates on imaging. CTA performed on 4/9 negative for PE, however noted significant bilateral consolidations. On 4/11, her hypoxemia continued to worsen, requiring NRB mask, and she developed severe respiratory distress. She was transferred to the MICU and emergently intubated. Repeat CT chest performed on 4/11 demonstrated progressive worsening of bilateral peripheral infiltrates.    Hospital/ICU Course:  Admitted to MICU on 4/11, intubated emergently for severe hypoxemia and respiratory distress. ID was consulted for assistance in management given immunocompromised state. Patient paralyzed with nimbex following persistent dysynchrony with vent despite sedation. Abx broadened to vancomycin, zosyn, bactrim, and posaconazole. Norepinephrine initiated for BP support.  Bronchoscopy performed at bedside on 4/11 and all cultures including fungal cultures negative. Nimbex discontinued on 4/12. She continued to require significant oxygen support thereafter, and was unable to wean oxygen requirements significantly since intubation. However, she has made slow improvements in her oxygenation since 4/18. She went into A-fib RVR in 120-160's on the evening of 4/17, and was briefly started on amiodarone gtt; this discontinued and metoprolol added with good rate control on 4/18. SAT/SBT continue daily with difficulty weaning her off fentanyl as she gets agitated tachycardic and hypertensive ('s). Valium initiated in effort to wean sedation to accommodate possible extubation on 4/22.    Interval History/Significant Events: Early this am, she became very agitated and hypertensive, precedex was increased resulting in hypotension requiring twyla bump and brief period on levophed. Precedex decreased and after a period of time, BP returned to normal and levo d/c'd.    Review of Systems   Unable to perform ROS: Intubated     Objective:     Vital Signs (Most Recent):  Temp: 98.4 °F (36.9 °C) (04/21/18 1545)  Pulse: 89 (04/21/18 1545)  Resp: (!) 28 (04/21/18 1545)  BP: (!) 107/55 (04/21/18 1545)  SpO2: 96 % (04/21/18 1545) Vital Signs (24h Range):  Temp:  [98.4 °F (36.9 °C)-98.5 °F (36.9 °C)] 98.4 °F (36.9 °C)  Pulse:  [] 89  Resp:  [0-60] 28  SpO2:  [89 %-100 %] 96 %  BP: ()/(41-98) 107/55  Arterial Line BP: ()/() 97/38   Weight: 71.5 kg (157 lb 10.1 oz)  Body mass index is 26.23 kg/m².      Intake/Output Summary (Last 24 hours) at 04/21/18 1629  Last data filed at 04/21/18 1604   Gross per 24 hour   Intake          3692.46 ml   Output              400 ml   Net          3292.46 ml       Physical Exam   Constitutional: She appears well-developed. She appears lethargic. She does not have a sickly appearance. She is sedated and intubated.   HENT:   Head: Normocephalic and  atraumatic.   Eyes: Conjunctivae and EOM are normal. Pupils are equal, round, and reactive to light. Right eye exhibits no exudate. Left eye exhibits no exudate. Right conjunctiva has no hemorrhage. Left conjunctiva has no hemorrhage. No scleral icterus. Right eye exhibits no nystagmus. Left eye exhibits no nystagmus.   Pupils 1mm, and sluggish reaction to light   Neck: Trachea normal. No neck rigidity. No tracheal deviation present.       Cardiovascular: Regular rhythm and normal heart sounds.  Frequent extrasystoles are present. PMI is not displaced.  Exam reveals no gallop and no friction rub.    No murmur heard.  Pulses:       Radial pulses are 2+ on the right side, and 2+ on the left side.        Dorsalis pedis pulses are 2+ on the right side, and 2+ on the left side.   Pulmonary/Chest: Effort normal and breath sounds normal. Tachypnea noted. She is intubated. No respiratory distress. She has no wheezes. She has no rhonchi. She has no rales.   Breath sounds coarse bilaterally   Abdominal: Soft. Normal appearance and bowel sounds are normal. There is no tenderness.   Genitourinary:   Genitourinary Comments: Hill draining clear, yellow urine   Musculoskeletal: Normal range of motion.   Neurological: She has normal strength. She appears lethargic. GCS eye subscore is 1. GCS verbal subscore is 1. GCS motor subscore is 6.   Follows commands with a delay. Cough and gag reflex intact   Skin: Skin is warm. No cyanosis. Nails show no clubbing.   Nursing note and vitals reviewed.      Vents:  Vent Mode: A/C (04/21/18 1521)  Set Rate: 16 bmp (04/21/18 1521)  Vt Set: 375 mL (04/21/18 1521)  Pressure Support: 0 cmH20 (04/21/18 1521)  PEEP/CPAP: 8 cmH20 (04/21/18 1521)  Oxygen Concentration (%): 30 (04/21/18 1545)  Peak Airway Pressure: 30 cmH2O (04/21/18 1521)  Plateau Pressure: 19 cmH20 (04/21/18 1521)  Total Ve: 11 mL (04/21/18 1521)  F/VT Ratio<105 (RSBI): (!) 70.18 (04/21/18 1521)  Lines/Drains/Airways     Central  Venous Catheter Line                 Percutaneous Central Line Insertion/Assessment - triple lumen  04/11/18 1513 right subclavian 10 days          Drain                 NG/OG Tube 04/11/18 1340 Left mouth 10 days         Rectal Tube 04/16/18 0600 rectal tube w/ balloon (indicate number of mLs) 5 days    Female External Urinary Catheter 04/19/18 1400 2 days          Airway                 Airway - Non-Surgical 04/11/18 1337 Endotracheal Tube 10 days          Arterial Line                 Arterial Line 04/11/18 1540 Right Radial 10 days              Significant Labs:    CBC/Anemia Profile:    Recent Labs  Lab 04/20/18  0253 04/20/18  2311 04/21/18  0359   WBC 29.61* 27.50* 22.63*   HGB 10.0* 9.3* 9.0*   HCT 34.3* 32.2* 30.0*   * 375* 352*   MCV 80* 83 79*   RDW 18.6* 18.8* 18.7*        Chemistries:    Recent Labs  Lab 04/20/18  0253  04/20/18  1727 04/21/18  0359 04/21/18  0928   NA  --   < > 152* 153* 155*   K  --   < > 4.5 4.4 4.0   CL  --   < > 114* 115* 116*   CO2  --   < > 30* 28 30*   BUN  --   < > 58* 67* 69*   CREATININE  --   < > 1.1 1.1 1.2   CALCIUM  --   < > 8.2* 8.3* 8.5*   ALBUMIN 2.0*  --   --  1.7*  --    PROT 6.6  --   --  5.7*  --    BILITOT 0.7  --   --  0.5  --    ALKPHOS 81  --   --  69  --    ALT 12  --   --  9*  --    AST 11  --   --  9*  --    MG 2.3  --   --  2.2  --    PHOS 2.6*  --   --  3.8  --    < > = values in this interval not displayed.    All pertinent labs within the past 24 hours have been reviewed.    Significant Imaging:  I have reviewed all pertinent imaging results/findings within the past 24 hours.    Assessment/Plan:     Neuro   Encephalopathy, metabolic    --suspect secondary to sedatives for ventilator dyssynchrony and critical illness.    --stop precedex and fentanyl for daily SAT's   --appears to be withdrawing from fentanyl, as she is tachycardic/tachypenic/HTN when turned off abruptly. Wean as tolerated.   --valium 2mg PO q6 to encourage fentanyl weaning           Cerebrovascular accident (CVA)    --Remote lacunar infarcts in the right centrum semi-ovale, basal ganglia, and right tatyana per CTH w/o contrast on 12/2017.  --Continue home clopidogrel.        Pulmonary   * Acute hypoxemic respiratory failure    --Suspect secondary to severe bilateral pneumonia in the setting of immunosuppression; possibly  vs reaction from Humira   --Severely worsened over first week of admission despite CAP treatment.  --s/p emergent intubation on 4/11 upon MICU admission for severe hypoxemia and severe respiratory distress.  --Bronchoscopy on 4/11 with wash; culture with few WBCs, no organisms.  --Antibiotics broadened to vancomycin, zosyn, bactrim, and posaconazole initally; currently continuing posaconazole and atovoquone   --Blood, sputum, and urine cultures negative from 4/10 and 4/18  --KOH prep negative, AFB stain with no acid fast bacilli noted, fungal culture negative  --Aspergillus, Histoplasma, and Cryptococcal antigen in blood is negative. Legionella and blasomyces negative.   --Continue IV steroids for possibility of            ARDS (adult respiratory distress syndrome)    --see above        Pneumonia of both upper lobes due to infectious organism    --see above        Cardiac/Vascular   Atrial fibrillation with RVR    --noted on 4/17. New this admission  --continue metoprolol for rate control.   --CHADS Vasc score is 7; will discuss anticoagulation, but likely would defer until clinical picture more stable           Hypertension, essential    --continue to hold htn meds as patient normotensive        Renal/   Metabolic alkalosis    --trend. Hold additional diuretics         Hypernatremia    --increase free water boluses to 400 q6h  --add D5W gtt 100cc/hr for 5 hours  --trend BMP's.           Endocrine   Hyperglycemia    --Likely steroid-induced.  --transitioned from gtt to SQ insulin.  --Goal -180.        Thyroid enlargement    --History of Hashimoto's  thyroiditis.  --Intrathoracic extension on CT.  --TSH and free T4 wnl.          GI   PUD (peptic ulcer disease)    --Continue PPI.        Orthopedic   Seropositive rheumatoid arthritis of multiple sites    --Appreciate Rheumatology assistance.  --Continue hydroxychloroquine.  --Hold home prednisone; switched to IV SoluMedrol 80mg Q12h.        Other   Long term (current) use of systemic steroids    --see above           Critical Care Daily Checklist:    A: Awake: RASS Goal/Actual Goal: RASS Goal: 0-->alert and calm  Actual: [place] Agitation Sedation Scale (RASS): Restless   B: Spontaneous Breathing Trial Performed?     C: SAT & SBT Coordinated?  yes                      D: Delirium: CAM-ICU Overall CAM-ICU: Positive   E: Early Mobility Performed? No   F: Feeding Goal: Goals: Pt to receive >85% EEN and EPN  Status: Nutrition Goal Status: goal met   Current Diet Order   Procedures    Diet NPO      AS: Analgesia/Sedation Fentanyl precedex   T: Thromboembolic Prophylaxis heparin   H: HOB > 300 Yes   U: Stress Ulcer Prophylaxis (if needed) ppi   G: Glucose Control ssi   B: Bowel Function Stool Occurrence: 1   I: Indwelling Catheter (Lines & Hill) Necessity Pure wick   D: De-escalation of Antimicrobials/Pharmacotherapies n/a    Plan for the day/ETD SBT     Code Status:  Family/Goals of Care: Full Code  SBT     Critical Care Time: 55 minutes  Critical secondary to Patient has a condition that poses threat to life and bodily function: Acute hypoxemic respiratory failure      Critical care was time spent personally by me on the following activities: development of treatment plan with patient or surrogate and bedside caregivers, discussions with consultants, evaluation of patient's response to treatment, examination of patient, ordering and performing treatments and interventions, ordering and review of laboratory studies, ordering and review of radiographic studies, pulse oximetry, re-evaluation of patient's condition.  This critical care time did not overlap with that of any other provider or involve time for any procedures.     Caio Nevarez NP  Critical Care Medicine  [facility]

## 2018-04-21 NOTE — ASSESSMENT & PLAN NOTE
--suspect secondary to sedatives for ventilator dyssynchrony and critical illness.    --stop precedex and fentanyl for daily SAT's   --appears to be withdrawing from fentanyl, as she is tachycardic/tachypenic/HTN when turned off abruptly. Wean as tolerated.   --valium 2mg PO q6 to encourage fentanyl weaning

## 2018-04-21 NOTE — PLAN OF CARE
Problem: Patient Care Overview  Goal: Plan of Care Review  Outcome: Ongoing (interventions implemented as appropriate)  POC reviewed with patient, patient daughter Madhavi, & patient sister at bedside. SBT attempted today-- patient tachypnic, belly breathing, increased work of breathing on Spontaneous MV mode. Fentanyl currently infusing @ 5mcg/hr while Precedex is infusing @ 0.8 mcg/hr. Valium tablet started to further comfort patient &  Wean sedation for extubation. Will decrease  Precedex to maintain comfort in hopes of extubation 04/22/18.Tube feedings continuing to run at goal rate; Residual: 50cc; 400 cc free water bolus given Q6H. Patient following commands, however delayed responses. Patient left upper & lower extremities slightly weaker than right upper & lower extremities; team aware. Music therapy & diversion implemented throughout shift to further promote comfort and relaxation while weaning sedation. D5 infusion given. No skin breakdown noted during shift; mepilex on heels to protect pressure points, barrier cream applied to buttocks, moon boots & SCDs implemented. Patient straight x1 for retention. Daughter to remain at bedside. Will continue to monitor.

## 2018-04-21 NOTE — PROGRESS NOTES
04/21/18 1100 04/21/18 1600   Urine Assessment   Bladder Scan Volume (mL) 200 mL (>200)   Bowel incontinence x2; unsure if urinated with BM. KAMLA Kearney updated. Telephone order straight cath

## 2018-04-21 NOTE — PROGRESS NOTES
A-line reading 75/35 (46), cuff BP 81/49(61) and manual BP 80/40. Fentanyl decreased to 10 mcg/hr from 15 mcg/hr and precedex decreased to 0.6 mcg/kg/hr from 0.8 mcg/kg/hr. [facility] MD notified. States she will review chart. No new orders @ this time. Will continue to monitor.

## 2018-04-22 PROBLEM — E87.3 METABOLIC ALKALOSIS: Status: RESOLVED | Noted: 2018-04-18 | Resolved: 2018-04-22

## 2018-04-22 PROBLEM — I48.91 ATRIAL FIBRILLATION WITH RVR: Status: RESOLVED | Noted: 2018-04-18 | Resolved: 2018-04-22

## 2018-04-22 LAB
ALBUMIN SERPL BCP-MCNC: 1.8 G/DL
ALLENS TEST: ABNORMAL
ALLENS TEST: ABNORMAL
ALP SERPL-CCNC: 67 U/L
ALT SERPL W/O P-5'-P-CCNC: 12 U/L
ANION GAP SERPL CALC-SCNC: 7 MMOL/L
ANION GAP SERPL CALC-SCNC: 7 MMOL/L
AST SERPL-CCNC: 11 U/L
BASOPHILS # BLD AUTO: 0.01 K/UL
BASOPHILS NFR BLD: 0.1 %
BILIRUB DIRECT SERPL-MCNC: 0.3 MG/DL
BILIRUB SERPL-MCNC: 0.5 MG/DL
BUN SERPL-MCNC: 60 MG/DL
BUN SERPL-MCNC: 65 MG/DL
CALCIUM SERPL-MCNC: 8.9 MG/DL
CALCIUM SERPL-MCNC: 9.2 MG/DL
CHLORIDE SERPL-SCNC: 113 MMOL/L
CHLORIDE SERPL-SCNC: 115 MMOL/L
CO2 SERPL-SCNC: 29 MMOL/L
CO2 SERPL-SCNC: 30 MMOL/L
CREAT SERPL-MCNC: 0.8 MG/DL
CREAT SERPL-MCNC: 0.9 MG/DL
DELSYS: ABNORMAL
DELSYS: ABNORMAL
DIFFERENTIAL METHOD: ABNORMAL
EOSINOPHIL # BLD AUTO: 0 K/UL
EOSINOPHIL NFR BLD: 0 %
ERYTHROCYTE [DISTWIDTH] IN BLOOD BY AUTOMATED COUNT: 18.5 %
ERYTHROCYTE [SEDIMENTATION RATE] IN BLOOD BY WESTERGREN METHOD: 16 MM/H
EST. GFR  (AFRICAN AMERICAN): >60 ML/MIN/1.73 M^2
EST. GFR  (AFRICAN AMERICAN): >60 ML/MIN/1.73 M^2
EST. GFR  (NON AFRICAN AMERICAN): >60 ML/MIN/1.73 M^2
EST. GFR  (NON AFRICAN AMERICAN): >60 ML/MIN/1.73 M^2
FIO2: 30
FIO2: 30
GLUCOSE SERPL-MCNC: 129 MG/DL
GLUCOSE SERPL-MCNC: 210 MG/DL
HCO3 UR-SCNC: 30.5 MMOL/L (ref 24–28)
HCO3 UR-SCNC: 32 MMOL/L (ref 24–28)
HCT VFR BLD AUTO: 29.3 %
HGB BLD-MCNC: 8.8 G/DL
IMM GRANULOCYTES # BLD AUTO: 0.18 K/UL
IMM GRANULOCYTES NFR BLD AUTO: 0.9 %
INR PPP: 1.2
LYMPHOCYTES # BLD AUTO: 0.7 K/UL
LYMPHOCYTES NFR BLD: 3.7 %
MAGNESIUM SERPL-MCNC: 2.2 MG/DL
MCH RBC QN AUTO: 24.6 PG
MCHC RBC AUTO-ENTMCNC: 30 G/DL
MCV RBC AUTO: 82 FL
MIN VOL: 10.7
MODE: ABNORMAL
MODE: ABNORMAL
MONOCYTES # BLD AUTO: 0.4 K/UL
MONOCYTES NFR BLD: 2.1 %
NEUTROPHILS # BLD AUTO: 17.7 K/UL
NEUTROPHILS NFR BLD: 93.2 %
NRBC BLD-RTO: 0 /100 WBC
PCO2 BLDA: 42.7 MMHG (ref 35–45)
PCO2 BLDA: 43.4 MMHG (ref 35–45)
PEEP: 5
PEEP: 5
PH SMN: 7.46 [PH] (ref 7.35–7.45)
PH SMN: 7.48 [PH] (ref 7.35–7.45)
PHOSPHATE SERPL-MCNC: 4 MG/DL
PIP: 22
PLATELET # BLD AUTO: 298 K/UL
PMV BLD AUTO: 11.2 FL
PO2 BLDA: 80 MMHG (ref 80–100)
PO2 BLDA: 89 MMHG (ref 80–100)
POC BE: 7 MMOL/L
POC BE: 9 MMOL/L
POC SATURATED O2: 97 % (ref 95–100)
POC SATURATED O2: 97 % (ref 95–100)
POC TCO2: 32 MMOL/L (ref 23–27)
POC TCO2: 33 MMOL/L (ref 23–27)
POCT GLUCOSE: 136 MG/DL (ref 70–110)
POCT GLUCOSE: 153 MG/DL (ref 70–110)
POCT GLUCOSE: 154 MG/DL (ref 70–110)
POCT GLUCOSE: 198 MG/DL (ref 70–110)
POCT GLUCOSE: 251 MG/DL (ref 70–110)
POTASSIUM SERPL-SCNC: 4 MMOL/L
POTASSIUM SERPL-SCNC: 4.2 MMOL/L
PROT SERPL-MCNC: 5.7 G/DL
PROTHROMBIN TIME: 12.2 SEC
PS: 5
RBC # BLD AUTO: 3.58 M/UL
SAMPLE: ABNORMAL
SAMPLE: ABNORMAL
SITE: ABNORMAL
SITE: ABNORMAL
SODIUM SERPL-SCNC: 149 MMOL/L
SODIUM SERPL-SCNC: 152 MMOL/L
SP02: 97
VT: 375
WBC # BLD AUTO: 18.98 K/UL

## 2018-04-22 PROCEDURE — 99900026 HC AIRWAY MAINTENANCE (STAT)

## 2018-04-22 PROCEDURE — 25000003 PHARM REV CODE 250: Performed by: INTERNAL MEDICINE

## 2018-04-22 PROCEDURE — 63600175 PHARM REV CODE 636 W HCPCS: Performed by: NURSE PRACTITIONER

## 2018-04-22 PROCEDURE — 25000003 PHARM REV CODE 250: Performed by: NURSE PRACTITIONER

## 2018-04-22 PROCEDURE — 85025 COMPLETE CBC W/AUTO DIFF WBC: CPT

## 2018-04-22 PROCEDURE — 94640 AIRWAY INHALATION TREATMENT: CPT

## 2018-04-22 PROCEDURE — 82803 BLOOD GASES ANY COMBINATION: CPT

## 2018-04-22 PROCEDURE — 83735 ASSAY OF MAGNESIUM: CPT

## 2018-04-22 PROCEDURE — 20000000 HC ICU ROOM

## 2018-04-22 PROCEDURE — 80048 BASIC METABOLIC PNL TOTAL CA: CPT

## 2018-04-22 PROCEDURE — 37799 UNLISTED PX VASCULAR SURGERY: CPT

## 2018-04-22 PROCEDURE — 94761 N-INVAS EAR/PLS OXIMETRY MLT: CPT

## 2018-04-22 PROCEDURE — 84100 ASSAY OF PHOSPHORUS: CPT

## 2018-04-22 PROCEDURE — 85610 PROTHROMBIN TIME: CPT

## 2018-04-22 PROCEDURE — 99900035 HC TECH TIME PER 15 MIN (STAT)

## 2018-04-22 PROCEDURE — 27000221 HC OXYGEN, UP TO 24 HOURS

## 2018-04-22 PROCEDURE — 94003 VENT MGMT INPAT SUBQ DAY: CPT

## 2018-04-22 PROCEDURE — 25000242 PHARM REV CODE 250 ALT 637 W/ HCPCS: Performed by: INTERNAL MEDICINE

## 2018-04-22 PROCEDURE — 99291 CRITICAL CARE FIRST HOUR: CPT | Mod: ,,, | Performed by: INTERNAL MEDICINE

## 2018-04-22 PROCEDURE — 80076 HEPATIC FUNCTION PANEL: CPT

## 2018-04-22 RX ORDER — HYDROXYCHLOROQUINE SULFATE 200 MG/1
400 TABLET, FILM COATED ORAL DAILY
Status: DISCONTINUED | OUTPATIENT
Start: 2018-04-23 | End: 2018-04-26

## 2018-04-22 RX ORDER — CLOPIDOGREL BISULFATE 75 MG/1
75 TABLET ORAL NIGHTLY
Status: DISCONTINUED | OUTPATIENT
Start: 2018-04-22 | End: 2018-04-27

## 2018-04-22 RX ORDER — PANTOPRAZOLE SODIUM 40 MG/1
40 FOR SUSPENSION ORAL DAILY
Status: DISCONTINUED | OUTPATIENT
Start: 2018-04-23 | End: 2018-04-27

## 2018-04-22 RX ORDER — ATOVAQUONE 750 MG/5ML
1500 SUSPENSION ORAL DAILY
Status: DISCONTINUED | OUTPATIENT
Start: 2018-04-23 | End: 2018-04-24

## 2018-04-22 RX ORDER — DIAZEPAM 2 MG/1
2 TABLET ORAL EVERY 6 HOURS
Status: DISCONTINUED | OUTPATIENT
Start: 2018-04-22 | End: 2018-04-23

## 2018-04-22 RX ADMIN — DIAZEPAM 2 MG: 2 TABLET ORAL at 05:04

## 2018-04-22 RX ADMIN — HEPARIN SODIUM 5000 UNITS: 5000 INJECTION, SOLUTION INTRAVENOUS; SUBCUTANEOUS at 09:04

## 2018-04-22 RX ADMIN — HEPARIN SODIUM 5000 UNITS: 5000 INJECTION, SOLUTION INTRAVENOUS; SUBCUTANEOUS at 05:04

## 2018-04-22 RX ADMIN — PANTOPRAZOLE SODIUM 40 MG: 40 GRANULE, DELAYED RELEASE ORAL at 09:04

## 2018-04-22 RX ADMIN — Medication 12.5 MG: at 08:04

## 2018-04-22 RX ADMIN — MINERAL OIL AND WHITE PETROLATUM: 150; 830 OINTMENT OPHTHALMIC at 05:04

## 2018-04-22 RX ADMIN — INSULIN ASPART 3 UNITS: 100 INJECTION, SOLUTION INTRAVENOUS; SUBCUTANEOUS at 03:04

## 2018-04-22 RX ADMIN — DIAZEPAM 2 MG: 2 TABLET ORAL at 12:04

## 2018-04-22 RX ADMIN — ATOVAQUONE 1500 MG: 750 SUSPENSION ORAL at 09:04

## 2018-04-22 RX ADMIN — POSACONAZOLE 400 MG: 40 SUSPENSION ORAL at 09:04

## 2018-04-22 RX ADMIN — POSACONAZOLE 400 MG: 40 SUSPENSION ORAL at 08:04

## 2018-04-22 RX ADMIN — IPRATROPIUM BROMIDE AND ALBUTEROL SULFATE 3 ML: .5; 3 SOLUTION RESPIRATORY (INHALATION) at 11:04

## 2018-04-22 RX ADMIN — IPRATROPIUM BROMIDE AND ALBUTEROL SULFATE 3 ML: .5; 3 SOLUTION RESPIRATORY (INHALATION) at 07:04

## 2018-04-22 RX ADMIN — CHLORHEXIDINE GLUCONATE 15 ML: 1.2 RINSE ORAL at 09:04

## 2018-04-22 RX ADMIN — Medication 12.5 MG: at 09:04

## 2018-04-22 RX ADMIN — INSULIN ASPART 2 UNITS: 100 INJECTION, SOLUTION INTRAVENOUS; SUBCUTANEOUS at 07:04

## 2018-04-22 RX ADMIN — METHYLPREDNISOLONE SODIUM SUCCINATE 80 MG: 125 INJECTION, POWDER, FOR SOLUTION INTRAMUSCULAR; INTRAVENOUS at 08:04

## 2018-04-22 RX ADMIN — CLOPIDOGREL 75 MG: 75 TABLET, FILM COATED ORAL at 08:04

## 2018-04-22 RX ADMIN — IPRATROPIUM BROMIDE AND ALBUTEROL SULFATE 3 ML: .5; 3 SOLUTION RESPIRATORY (INHALATION) at 03:04

## 2018-04-22 RX ADMIN — HYDROXYCHLOROQUINE SULFATE 400 MG: 200 TABLET, FILM COATED ORAL at 09:04

## 2018-04-22 RX ADMIN — DIAZEPAM 2 MG: 2 TABLET ORAL at 11:04

## 2018-04-22 RX ADMIN — INSULIN ASPART 2 UNITS: 100 INJECTION, SOLUTION INTRAVENOUS; SUBCUTANEOUS at 05:04

## 2018-04-22 RX ADMIN — MINERAL OIL AND WHITE PETROLATUM: 150; 830 OINTMENT OPHTHALMIC at 09:04

## 2018-04-22 RX ADMIN — DIAZEPAM 2 MG: 2 TABLET ORAL at 06:04

## 2018-04-22 RX ADMIN — INSULIN ASPART 3 UNITS: 100 INJECTION, SOLUTION INTRAVENOUS; SUBCUTANEOUS at 12:04

## 2018-04-22 RX ADMIN — HEPARIN SODIUM 5000 UNITS: 5000 INJECTION, SOLUTION INTRAVENOUS; SUBCUTANEOUS at 03:04

## 2018-04-22 RX ADMIN — INSULIN ASPART 2 UNITS: 100 INJECTION, SOLUTION INTRAVENOUS; SUBCUTANEOUS at 12:04

## 2018-04-22 RX ADMIN — DEXMEDETOMIDINE HYDROCHLORIDE 0.9 MCG/KG/HR: 4 INJECTION, SOLUTION INTRAVENOUS at 06:04

## 2018-04-22 RX ADMIN — METHYLPREDNISOLONE SODIUM SUCCINATE 80 MG: 125 INJECTION, POWDER, FOR SOLUTION INTRAMUSCULAR; INTRAVENOUS at 09:04

## 2018-04-22 NOTE — ASSESSMENT & PLAN NOTE
--Suspect secondary to severe bilateral pneumonia in the setting of immunosuppression; possibly  vs reaction from Humira   --Severely worsened over first week of admission despite CAP treatment.  --s/p emergent intubation on 4/11 upon MICU admission for severe hypoxemia and severe respiratory distress.  --Bronchoscopy on 4/11 with wash; culture with few WBCs, no organisms.  --Antibiotics broadened to vancomycin, zosyn, bactrim, and posaconazole initally; currently continuing posaconazole and atovoquone   --Blood, sputum, and urine cultures negative from 4/10 and 4/18  --KOH prep negative, AFB stain with no acid fast bacilli noted, fungal culture negative  --Aspergillus, Histoplasma, and Cryptococcal antigen in blood is negative. Legionella and blasomyces negative.   --Continue IV steroids for possibility of    --SAT/SBT for possible extubation today

## 2018-04-22 NOTE — PROGRESS NOTES
0745:SBT & tube feedings stopped  10:40: Spontaneous mode restarted on MV per KAMLA Kearney order  ~11:10: extubated to 2-3L NC

## 2018-04-22 NOTE — ASSESSMENT & PLAN NOTE
--Likely steroid-induced.  --SQ insulin while patient NPO, pending bedside swallow study  --Goal -180.

## 2018-04-22 NOTE — SUBJECTIVE & OBJECTIVE
Interval History/Significant Events: no acute events overnight    Review of Systems   Unable to perform ROS: Intubated     Objective:     Vital Signs (Most Recent):  Temp: 97.7 °F (36.5 °C) (04/22/18 1200)  Pulse: 78 (04/22/18 1300)  Resp: (!) 28 (04/22/18 1300)  BP: (!) 153/67 (04/22/18 1300)  SpO2: 97 % (04/22/18 1300) Vital Signs (24h Range):  Temp:  [97.6 °F (36.4 °C)-98.4 °F (36.9 °C)] 97.7 °F (36.5 °C)  Pulse:  [75-96] 78  Resp:  [0-37] 28  SpO2:  [92 %-100 %] 97 %  BP: ()/(47-73) 153/67  Arterial Line BP: ()/(17-66) 163/66   Weight: 71.5 kg (157 lb 10.1 oz)  Body mass index is 26.23 kg/m².      Intake/Output Summary (Last 24 hours) at 04/22/18 1406  Last data filed at 04/22/18 1200   Gross per 24 hour   Intake          2369.89 ml   Output             1810 ml   Net           559.89 ml       Physical Exam   Constitutional: She appears well-developed. She is cooperative. She does not have a sickly appearance. She is intubated.   HENT:   Head: Normocephalic and atraumatic.   Eyes: Conjunctivae and EOM are normal. Pupils are equal, round, and reactive to light. Right eye exhibits no exudate. Left eye exhibits no exudate. Right conjunctiva has no hemorrhage. Left conjunctiva has no hemorrhage. No scleral icterus. Right eye exhibits no nystagmus. Left eye exhibits no nystagmus.   Pupils 1mm, and sluggish reaction to light   Neck: Trachea normal. No neck rigidity. No tracheal deviation present.       Cardiovascular: Normal rate, regular rhythm and normal heart sounds.   Occasional extrasystoles are present. PMI is not displaced.  Exam reveals no gallop and no friction rub.    No murmur heard.  Pulses:       Radial pulses are 2+ on the right side, and 2+ on the left side.        Dorsalis pedis pulses are 2+ on the right side, and 2+ on the left side.   Pulmonary/Chest: Effort normal and breath sounds normal. She is intubated. No respiratory distress. She has no wheezes. She has no rhonchi. She has no  rales.   Breath sounds coarse bilaterally   Abdominal: Soft. Normal appearance and bowel sounds are normal. There is no tenderness.   Genitourinary:   Genitourinary Comments: Hill draining clear, yellow urine   Musculoskeletal: Normal range of motion.   Neurological: She is alert. She has normal strength. GCS eye subscore is 4. GCS verbal subscore is 1. GCS motor subscore is 6.   Follows commands,protrudes tongue, lifts head off pillow, gives thumbs up bilaterally.    Skin: Skin is warm. No cyanosis. Nails show no clubbing.   Nursing note and vitals reviewed.      Vents:  Vent Mode: A/C (04/22/18 1121)  Set Rate: 16 bmp (04/22/18 1121)  Vt Set: 600 mL (04/22/18 1121)  Pressure Support: 0 cmH20 (04/22/18 1121)  PEEP/CPAP: 5 cmH20 (04/22/18 1121)  Oxygen Concentration (%): 30 (04/22/18 1121)  Peak Airway Pressure: 20 cmH2O (04/22/18 1121)  Plateau Pressure: 19 cmH20 (04/22/18 1121)  Total Ve: 9.86 mL (04/22/18 1121)  F/VT Ratio<105 (RSBI): (!) 80 (04/22/18 0730)  Lines/Drains/Airways     Central Venous Catheter Line                 Percutaneous Central Line Insertion/Assessment - triple lumen  04/11/18 1513 right subclavian 10 days          Drain                 Rectal Tube 04/16/18 0600 rectal tube w/ balloon (indicate number of mLs) 6 days         Urethral Catheter 04/22/18 0500 less than 1 day          Arterial Line                 Arterial Line 04/11/18 1540 Right Radial 10 days              Significant Labs:    CBC/Anemia Profile:    Recent Labs  Lab 04/20/18  2311 04/21/18  0359 04/22/18  0348   WBC 27.50* 22.63* 18.98*   HGB 9.3* 9.0* 8.8*   HCT 32.2* 30.0* 29.3*   * 352* 298   MCV 83 79* 82   RDW 18.8* 18.7* 18.5*        Chemistries:    Recent Labs  Lab 04/21/18  0359 04/21/18  0928 04/21/18  1624 04/22/18  0348 04/22/18  0741   * 155* 148*  --  149*   K 4.4 4.0 4.2  --  4.2   * 116* 112*  --  113*   CO2 28 30* 31*  --  29   BUN 67* 69* 68*  --  65*   CREATININE 1.1 1.2 1.1  --  0.9    CALCIUM 8.3* 8.5* 8.4*  --  8.9   ALBUMIN 1.7*  --   --  1.8*  --    PROT 5.7*  --   --  5.7*  --    BILITOT 0.5  --   --  0.5  --    ALKPHOS 69  --   --  67  --    ALT 9*  --   --  12  --    AST 9*  --   --  11  --    MG 2.2  --   --  2.2  --    PHOS 3.8  --   --  4.0  --        All pertinent labs within the past 24 hours have been reviewed.    Significant Imaging:  I have reviewed all pertinent imaging results/findings within the past 24 hours.

## 2018-04-22 NOTE — ASSESSMENT & PLAN NOTE
--suspect secondary to sedatives for ventilator dyssynchrony and critical illness.    --off all sedation since 4/21 at 2100  --continue valium 2mg PO q6

## 2018-04-22 NOTE — PLAN OF CARE
Problem: Patient Care Overview  Goal: Plan of Care Review  Outcome: Ongoing (interventions implemented as appropriate)  VSS within thew shift. Patient is medically sedated. Precedex is still ongoing. Fentanyl was able to be discontinued. Response to voice noted. On sinus rhythm. No BP spikes noted. Arterial line in place with appropriate waveforms. Ventilator settings kept at FIO2 of 30%, PEEP of 5, TV of 375 and BUR of 16. No desaturations noted. Tube feeding continued. At goal rate of 40. Flexiseal in place. Plan to do SBT this morning. POC discussed with patient an daughters. Will continue to monitor.

## 2018-04-22 NOTE — PROGRESS NOTES
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 She was seen by her PCP on 4/2 for these complaints and received a prescription for duo nebs and tessalon perles. CXR performed on 4/2 noted bilateral basilar infiltrates. At that time, she denied fever, chills, sputum production, sick contacts. She was admitted to  and treated emprically for CAP with rocephin and azithromycin. She was also diuresed daily. However, her oxygen requirements slowly increased from 2 L NC to 4 L NC with also progressively worsening bilateral infiltrates on imaging. CTA performed on 4/9 negative for PE, however noted significant bilateral consolidations. On 4/11, her hypoxemia continued to worsen, requiring NRB mask, and she developed severe respiratory distress. She was transferred to the MICU and emergently intubated. Repeat CT chest performed on 4/11 demonstrated progressive worsening of bilateral peripheral infiltrates.    Hospital/ICU Course:  Admitted to MICU on 4/11, intubated emergently for severe hypoxemia and respiratory distress. ID was consulted for assistance in management given immunocompromised state. Patient paralyzed with nimbex following persistent dysynchrony with vent despite sedation. Abx broadened to vancomycin, zosyn, bactrim, and posaconazole. Norepinephrine initiated for BP support.  Bronchoscopy performed at bedside on 4/11 and all cultures including fungal cultures negative. Nimbex discontinued on 4/12. She continued to require significant oxygen support thereafter, and was unable to wean oxygen requirements significantly since intubation. However, she has made slow improvements in her oxygenation since 4/18. She went into A-fib RVR in 120-160's on the evening of 4/17, and was briefly started on amiodarone gtt; this discontinued and metoprolol added with good rate control on 4/18. SAT/SBT continue daily with difficulty weaning her off fentanyl as she gets agitated tachycardic and hypertensive ('s). Valium initiated in effort to wean fentanyl off. Patient extubated on 4/22 to nasal cannula    Interval History/Significant Events: no acute events overnight    Review of Systems   Unable to perform ROS: Intubated     Objective:     Vital Signs (Most Recent):  Temp: 97.7 °F (36.5 °C) (04/22/18 1200)  Pulse: 78 (04/22/18 1300)  Resp: (!) 28 (04/22/18 1300)  BP: (!) 153/67 (04/22/18 1300)  SpO2: 97 % (04/22/18 1300) Vital Signs (24h Range):  Temp:  [97.6 °F (36.4 °C)-98.4 °F (36.9 °C)] 97.7 °F (36.5 °C)  Pulse:  [75-96] 78  Resp:  [0-37] 28  SpO2:  [92 %-100 %] 97 %  BP: ()/(47-73) 153/67  Arterial Line BP: ()/(17-66) 163/66   Weight: 71.5 kg (157 lb 10.1 oz)  Body mass index is 26.23 kg/m².      Intake/Output Summary (Last 24 hours) at 04/22/18 1406  Last data filed at 04/22/18 1200   Gross per 24 hour   Intake          2369.89 ml   Output             1810 ml   Net           559.89 ml       Physical Exam   Constitutional: She appears well-developed. She is cooperative. She does not have a sickly appearance. She is intubated.   HENT:   Head: Normocephalic and atraumatic.   Eyes: Conjunctivae and EOM are normal. Pupils are equal, round, and reactive to light. Right eye exhibits no exudate. Left eye exhibits no exudate. Right conjunctiva has no hemorrhage. Left conjunctiva has no  hemorrhage. No scleral icterus. Right eye exhibits no nystagmus. Left eye exhibits no nystagmus.   Pupils 1mm, and sluggish reaction to light   Neck: Trachea normal. No neck rigidity. No tracheal deviation present.       Cardiovascular: Normal rate, regular rhythm and normal heart sounds.   Occasional extrasystoles are present. PMI is not displaced.  Exam reveals no gallop and no friction rub.    No murmur heard.  Pulses:       Radial pulses are 2+ on the right side, and 2+ on the left side.        Dorsalis pedis pulses are 2+ on the right side, and 2+ on the left side.   Pulmonary/Chest: Effort normal and breath sounds normal. She is intubated. No respiratory distress. She has no wheezes. She has no rhonchi. She has no rales.   Breath sounds coarse bilaterally   Abdominal: Soft. Normal appearance and bowel sounds are normal. There is no tenderness.   Genitourinary:   Genitourinary Comments: Hill draining clear, yellow urine   Musculoskeletal: Normal range of motion.   Neurological: She is alert. She has normal strength. GCS eye subscore is 4. GCS verbal subscore is 1. GCS motor subscore is 6.   Follows commands,protrudes tongue, lifts head off pillow, gives thumbs up bilaterally.    Skin: Skin is warm. No cyanosis. Nails show no clubbing.   Nursing note and vitals reviewed.      Vents:  Vent Mode: A/C (04/22/18 1121)  Set Rate: 16 bmp (04/22/18 1121)  Vt Set: 600 mL (04/22/18 1121)  Pressure Support: 0 cmH20 (04/22/18 1121)  PEEP/CPAP: 5 cmH20 (04/22/18 1121)  Oxygen Concentration (%): 30 (04/22/18 1121)  Peak Airway Pressure: 20 cmH2O (04/22/18 1121)  Plateau Pressure: 19 cmH20 (04/22/18 1121)  Total Ve: 9.86 mL (04/22/18 1121)  F/VT Ratio<105 (RSBI): (!) 80 (04/22/18 0730)  Lines/Drains/Airways     Central Venous Catheter Line                 Percutaneous Central Line Insertion/Assessment - triple lumen  04/11/18 1513 right subclavian 10 days          Drain                 Rectal Tube 04/16/18 0600 rectal tube  w/ balloon (indicate number of mLs) 6 days         Urethral Catheter 04/22/18 0500 less than 1 day          Arterial Line                 Arterial Line 04/11/18 1540 Right Radial 10 days              Significant Labs:    CBC/Anemia Profile:    Recent Labs  Lab 04/20/18  2311 04/21/18  0359 04/22/18  0348   WBC 27.50* 22.63* 18.98*   HGB 9.3* 9.0* 8.8*   HCT 32.2* 30.0* 29.3*   * 352* 298   MCV 83 79* 82   RDW 18.8* 18.7* 18.5*        Chemistries:    Recent Labs  Lab 04/21/18  0359 04/21/18  0928 04/21/18  1624 04/22/18  0348 04/22/18  0741   * 155* 148*  --  149*   K 4.4 4.0 4.2  --  4.2   * 116* 112*  --  113*   CO2 28 30* 31*  --  29   BUN 67* 69* 68*  --  65*   CREATININE 1.1 1.2 1.1  --  0.9   CALCIUM 8.3* 8.5* 8.4*  --  8.9   ALBUMIN 1.7*  --   --  1.8*  --    PROT 5.7*  --   --  5.7*  --    BILITOT 0.5  --   --  0.5  --    ALKPHOS 69  --   --  67  --    ALT 9*  --   --  12  --    AST 9*  --   --  11  --    MG 2.2  --   --  2.2  --    PHOS 3.8  --   --  4.0  --        All pertinent labs within the past 24 hours have been reviewed.    Significant Imaging:  I have reviewed all pertinent imaging results/findings within the past 24 hours.    Assessment/Plan:     Neuro   Encephalopathy, metabolic    --suspect secondary to sedatives for ventilator dyssynchrony and critical illness.    --off all sedation since 4/21 at 2100  --continue valium 2mg PO q6          Cerebrovascular accident (CVA)    --Remote lacunar infarcts in the right centrum semi-ovale, basal ganglia, and right tatyana per CT w/o contrast on 12/2017.  --Continue home clopidogrel.        Pulmonary   * Acute hypoxemic respiratory failure    --Suspect secondary to severe bilateral pneumonia in the setting of immunosuppression; possibly  vs reaction from Humira   --Severely worsened over first week of admission despite CAP treatment.  --s/p emergent intubation on 4/11 upon MICU admission for severe hypoxemia and severe respiratory  distress.  --Bronchoscopy on 4/11 with wash; culture with few WBCs, no organisms.  --Antibiotics broadened to vancomycin, zosyn, bactrim, and posaconazole initally; currently continuing posaconazole and atovoquone   --Blood, sputum, and urine cultures negative from 4/10 and 4/18  --KOH prep negative, AFB stain with no acid fast bacilli noted, fungal culture negative  --Aspergillus, Histoplasma, and Cryptococcal antigen in blood is negative. Legionella and blasomyces negative.   --Continue IV steroids for possibility of    --SAT/SBT for possible extubation today          ARDS (adult respiratory distress syndrome)    --see above        Pneumonia of both upper lobes due to infectious organism    --see above        Cardiac/Vascular   Hypertension, essential    --continue to hold htn meds as patient normotensive        Renal/   Hypernatremia    --trend BMP's.   --consider D5W infusion for worsening Na        Endocrine   Hyperglycemia    --Likely steroid-induced.  --SQ insulin while patient NPO, pending bedside swallow study  --Goal -180.        Thyroid enlargement    --History of Hashimoto's thyroiditis.  --Intrathoracic extension on CT.  --TSH and free T4 wnl.          GI   PUD (peptic ulcer disease)    --Continue PPI.        Orthopedic   Seropositive rheumatoid arthritis of multiple sites    --Appreciate Rheumatology assistance.  --Continue hydroxychloroquine.  --Hold home prednisone; switched to IV SoluMedrol 80mg Q12h.        Other   Long term (current) use of systemic steroids    --see above           Critical Care Daily Checklist:    A: Awake: RASS Goal/Actual Goal: RASS Goal: 0-->alert and calm  Actual: Monzon Agitation Sedation Scale (RASS): Alert and calm   B: Spontaneous Breathing Trial Performed?     C: SAT & SBT Coordinated?  yes                      D: Delirium: CAM-ICU Overall CAM-ICU: Negative   E: Early Mobility Performed? No   F: Feeding Goal: Goals: Pt to receive >85% EEN and EPN  Status:  Nutrition Goal Status: goal met   Current Diet Order   Procedures    Diet NPO      AS: Analgesia/Sedation n/a   T: Thromboembolic Prophylaxis scds   H: HOB > 300 Yes   U: Stress Ulcer Prophylaxis (if needed) ppi   G: Glucose Control npo   B: Bowel Function Stool Occurrence: 1   I: Indwelling Catheter (Lines & Parikh) Necessity parikh   D: De-escalation of Antimicrobials/Pharmacotherapies n/a    Plan for the day/ETD extubate    Code Status:  Family/Goals of Care: Full Code  extubate     Critical Care Time: 55 minutes  Critical secondary to Patient has a condition that poses threat to life and bodily function: Acute hypoxemic respiratory failure      Critical care was time spent personally by me on the following activities: development of treatment plan with patient or surrogate and bedside caregivers, discussions with consultants, evaluation of patient's response to treatment, examination of patient, ordering and performing treatments and interventions, ordering and review of laboratory studies, ordering and review of radiographic studies, pulse oximetry, re-evaluation of patient's condition. This critical care time did not overlap with that of any other provider or involve time for any procedures.     Caio Nevarez, NP  Critical Care Medicine  [facility]

## 2018-04-23 PROBLEM — R05.9 COUGH: Status: ACTIVE | Noted: 2018-04-23

## 2018-04-23 PROBLEM — R06.00 DYSPNEA: Status: ACTIVE | Noted: 2018-04-23

## 2018-04-23 PROBLEM — G93.41 ENCEPHALOPATHY, METABOLIC: Status: RESOLVED | Noted: 2018-04-11 | Resolved: 2018-04-23

## 2018-04-23 PROBLEM — J18.9 PNEUMONIA OF BOTH LUNGS DUE TO INFECTIOUS ORGANISM: Status: ACTIVE | Noted: 2018-04-23

## 2018-04-23 LAB
ALBUMIN SERPL BCP-MCNC: 1.9 G/DL
ALP SERPL-CCNC: 58 U/L
ALT SERPL W/O P-5'-P-CCNC: 16 U/L
ANION GAP SERPL CALC-SCNC: 7 MMOL/L
ANISOCYTOSIS BLD QL SMEAR: SLIGHT
AST SERPL-CCNC: 16 U/L
BACTERIA BLD CULT: NORMAL
BACTERIA BLD CULT: NORMAL
BASOPHILS # BLD AUTO: 0.02 K/UL
BASOPHILS NFR BLD: 0.1 %
BILIRUB DIRECT SERPL-MCNC: 0.3 MG/DL
BILIRUB SERPL-MCNC: 0.6 MG/DL
BUN SERPL-MCNC: 59 MG/DL
CALCIUM SERPL-MCNC: 8.4 MG/DL
CHLORIDE SERPL-SCNC: 113 MMOL/L
CO2 SERPL-SCNC: 28 MMOL/L
CREAT SERPL-MCNC: 0.8 MG/DL
DIFFERENTIAL METHOD: ABNORMAL
EOSINOPHIL # BLD AUTO: 0 K/UL
EOSINOPHIL NFR BLD: 0 %
ERYTHROCYTE [DISTWIDTH] IN BLOOD BY AUTOMATED COUNT: 18.3 %
EST. GFR  (AFRICAN AMERICAN): >60 ML/MIN/1.73 M^2
EST. GFR  (NON AFRICAN AMERICAN): >60 ML/MIN/1.73 M^2
GLUCOSE SERPL-MCNC: 141 MG/DL
HCT VFR BLD AUTO: 28.6 %
HGB BLD-MCNC: 8.5 G/DL
HYPOCHROMIA BLD QL SMEAR: ABNORMAL
IMM GRANULOCYTES # BLD AUTO: 0.22 K/UL
IMM GRANULOCYTES NFR BLD AUTO: 1 %
INR PPP: 1.3
LYMPHOCYTES # BLD AUTO: 0.4 K/UL
LYMPHOCYTES NFR BLD: 1.9 %
MAGNESIUM SERPL-MCNC: 2.3 MG/DL
MCH RBC QN AUTO: 23.9 PG
MCHC RBC AUTO-ENTMCNC: 29.7 G/DL
MCV RBC AUTO: 80 FL
MONOCYTES # BLD AUTO: 0.4 K/UL
MONOCYTES NFR BLD: 1.6 %
NEUTROPHILS # BLD AUTO: 21.9 K/UL
NEUTROPHILS NFR BLD: 95.4 %
NRBC BLD-RTO: 0 /100 WBC
PHOSPHATE SERPL-MCNC: 3.4 MG/DL
PLATELET # BLD AUTO: 284 K/UL
PLATELET BLD QL SMEAR: ABNORMAL
PMV BLD AUTO: 10.9 FL
POCT GLUCOSE: 108 MG/DL (ref 70–110)
POCT GLUCOSE: 132 MG/DL (ref 70–110)
POCT GLUCOSE: 135 MG/DL (ref 70–110)
POCT GLUCOSE: 135 MG/DL (ref 70–110)
POCT GLUCOSE: 139 MG/DL (ref 70–110)
POCT GLUCOSE: 143 MG/DL (ref 70–110)
POLYCHROMASIA BLD QL SMEAR: ABNORMAL
POTASSIUM SERPL-SCNC: 4 MMOL/L
PROT SERPL-MCNC: 5.3 G/DL
PROTHROMBIN TIME: 13 SEC
RBC # BLD AUTO: 3.56 M/UL
SODIUM SERPL-SCNC: 148 MMOL/L
WBC # BLD AUTO: 22.91 K/UL

## 2018-04-23 PROCEDURE — 36569 INSJ PICC 5 YR+ W/O IMAGING: CPT

## 2018-04-23 PROCEDURE — 80076 HEPATIC FUNCTION PANEL: CPT

## 2018-04-23 PROCEDURE — 25000003 PHARM REV CODE 250: Performed by: NURSE PRACTITIONER

## 2018-04-23 PROCEDURE — G8996 SWALLOW CURRENT STATUS: HCPCS | Mod: CM

## 2018-04-23 PROCEDURE — 63600175 PHARM REV CODE 636 W HCPCS: Performed by: NURSE PRACTITIONER

## 2018-04-23 PROCEDURE — 25000003 PHARM REV CODE 250: Performed by: STUDENT IN AN ORGANIZED HEALTH CARE EDUCATION/TRAINING PROGRAM

## 2018-04-23 PROCEDURE — 27000221 HC OXYGEN, UP TO 24 HOURS

## 2018-04-23 PROCEDURE — 25000242 PHARM REV CODE 250 ALT 637 W/ HCPCS: Performed by: INTERNAL MEDICINE

## 2018-04-23 PROCEDURE — 80048 BASIC METABOLIC PNL TOTAL CA: CPT

## 2018-04-23 PROCEDURE — 25000003 PHARM REV CODE 250: Performed by: CLINICAL NURSE SPECIALIST

## 2018-04-23 PROCEDURE — 83735 ASSAY OF MAGNESIUM: CPT

## 2018-04-23 PROCEDURE — 94640 AIRWAY INHALATION TREATMENT: CPT

## 2018-04-23 PROCEDURE — 25000003 PHARM REV CODE 250: Performed by: INTERNAL MEDICINE

## 2018-04-23 PROCEDURE — 20000000 HC ICU ROOM

## 2018-04-23 PROCEDURE — 85610 PROTHROMBIN TIME: CPT

## 2018-04-23 PROCEDURE — 97803 MED NUTRITION INDIV SUBSEQ: CPT

## 2018-04-23 PROCEDURE — 76937 US GUIDE VASCULAR ACCESS: CPT

## 2018-04-23 PROCEDURE — 92610 EVALUATE SWALLOWING FUNCTION: CPT

## 2018-04-23 PROCEDURE — 84100 ASSAY OF PHOSPHORUS: CPT

## 2018-04-23 PROCEDURE — 97164 PT RE-EVAL EST PLAN CARE: CPT

## 2018-04-23 PROCEDURE — 94761 N-INVAS EAR/PLS OXIMETRY MLT: CPT

## 2018-04-23 PROCEDURE — G8997 SWALLOW GOAL STATUS: HCPCS | Mod: CL

## 2018-04-23 PROCEDURE — 99233 SBSQ HOSP IP/OBS HIGH 50: CPT | Mod: ,,, | Performed by: INTERNAL MEDICINE

## 2018-04-23 PROCEDURE — 97530 THERAPEUTIC ACTIVITIES: CPT

## 2018-04-23 PROCEDURE — 99233 SBSQ HOSP IP/OBS HIGH 50: CPT | Mod: ,,, | Performed by: CLINICAL NURSE SPECIALIST

## 2018-04-23 PROCEDURE — C1751 CATH, INF, PER/CENT/MIDLINE: HCPCS

## 2018-04-23 PROCEDURE — 85025 COMPLETE CBC W/AUTO DIFF WBC: CPT

## 2018-04-23 RX ORDER — RAMELTEON 8 MG/1
8 TABLET ORAL NIGHTLY PRN
Status: DISCONTINUED | OUTPATIENT
Start: 2018-04-23 | End: 2018-04-27

## 2018-04-23 RX ORDER — IPRATROPIUM BROMIDE AND ALBUTEROL SULFATE 2.5; .5 MG/3ML; MG/3ML
3 SOLUTION RESPIRATORY (INHALATION)
Status: DISCONTINUED | OUTPATIENT
Start: 2018-04-23 | End: 2018-04-23

## 2018-04-23 RX ORDER — HYDROXYCHLOROQUINE SULFATE 200 MG/1
TABLET, FILM COATED ORAL
Qty: 180 TABLET | Refills: 1 | OUTPATIENT
Start: 2018-04-23

## 2018-04-23 RX ORDER — HYDROXYZINE HYDROCHLORIDE 10 MG/1
10 TABLET, FILM COATED ORAL ONCE
Status: COMPLETED | OUTPATIENT
Start: 2018-04-23 | End: 2018-04-23

## 2018-04-23 RX ORDER — METHYLPREDNISOLONE SOD SUCC 125 MG
80 VIAL (EA) INJECTION DAILY
Status: DISCONTINUED | OUTPATIENT
Start: 2018-04-24 | End: 2018-04-24

## 2018-04-23 RX ORDER — AMLODIPINE BESYLATE 5 MG/1
5 TABLET ORAL DAILY
Status: DISCONTINUED | OUTPATIENT
Start: 2018-04-23 | End: 2018-04-27

## 2018-04-23 RX ADMIN — HYDROXYCHLOROQUINE SULFATE 400 MG: 200 TABLET, FILM COATED ORAL at 08:04

## 2018-04-23 RX ADMIN — Medication 12.5 MG: at 09:04

## 2018-04-23 RX ADMIN — IPRATROPIUM BROMIDE AND ALBUTEROL SULFATE 3 ML: .5; 3 SOLUTION RESPIRATORY (INHALATION) at 07:04

## 2018-04-23 RX ADMIN — HEPARIN SODIUM 5000 UNITS: 5000 INJECTION, SOLUTION INTRAVENOUS; SUBCUTANEOUS at 09:04

## 2018-04-23 RX ADMIN — CLOPIDOGREL 75 MG: 75 TABLET, FILM COATED ORAL at 08:04

## 2018-04-23 RX ADMIN — HEPARIN SODIUM 5000 UNITS: 5000 INJECTION, SOLUTION INTRAVENOUS; SUBCUTANEOUS at 05:04

## 2018-04-23 RX ADMIN — Medication 12.5 MG: at 08:04

## 2018-04-23 RX ADMIN — IPRATROPIUM BROMIDE AND ALBUTEROL SULFATE 3 ML: .5; 3 SOLUTION RESPIRATORY (INHALATION) at 05:04

## 2018-04-23 RX ADMIN — ATOVAQUONE 1500 MG: 750 SUSPENSION ORAL at 08:04

## 2018-04-23 RX ADMIN — HEPARIN SODIUM 5000 UNITS: 5000 INJECTION, SOLUTION INTRAVENOUS; SUBCUTANEOUS at 02:04

## 2018-04-23 RX ADMIN — PANTOPRAZOLE SODIUM 40 MG: 40 GRANULE, DELAYED RELEASE ORAL at 08:04

## 2018-04-23 RX ADMIN — HYDRALAZINE HYDROCHLORIDE 10 MG: 20 INJECTION INTRAMUSCULAR; INTRAVENOUS at 07:04

## 2018-04-23 RX ADMIN — POSACONAZOLE 400 MG: 40 SUSPENSION ORAL at 08:04

## 2018-04-23 RX ADMIN — HYDROXYZINE HYDROCHLORIDE 10 MG: 10 TABLET, FILM COATED ORAL at 09:04

## 2018-04-23 RX ADMIN — RAMELTEON 8 MG: 8 TABLET, FILM COATED ORAL at 02:04

## 2018-04-23 RX ADMIN — AMLODIPINE BESYLATE 5 MG: 5 TABLET ORAL at 03:04

## 2018-04-23 RX ADMIN — METHYLPREDNISOLONE SODIUM SUCCINATE 80 MG: 125 INJECTION, POWDER, FOR SOLUTION INTRAMUSCULAR; INTRAVENOUS at 08:04

## 2018-04-23 NOTE — PLAN OF CARE
Problem: Patient Care Overview  Goal: Plan of Care Review  Outcome: Ongoing (interventions implemented as appropriate)  No acute events throughout day. See vital signs and assessments in flowsheets. See below for updates on today's progress.     Pulmonary: Patient on 2L NC with sats of 100%.     Cardiovascular: HR NSR at 77. Blood pressure 160-170/70. Restarted on 5 mg amlodipine this afternoon. Patient received PRN hydralazine once for SBP > 160.     Neurological: AAOX4. Patient does not understand her limitations from being bed bound.     Gastrointestinal: +BS. Two BMs this shift.     Genitourinary: Hill d/c'd. Pure wick in place. Patient due to void by 2100.    Endocrine: Normoglycemic.     Integumentary/Other: Midline catheter placed. Transfer orders placed. Waiting for bed assignment.     Infusions: No current infusions.     Patient progressing towards goals as tolerated, plan of care communicated and reviewed with Selma Lux MD and family. All concerns addressed. Will continue to monitor.

## 2018-04-23 NOTE — PT/OT/SLP EVAL
Speech Language Pathology Evaluation  Bedside Swallow    Patient Name:  Selma Lux MD   MRN:  [id number]  Admitting Diagnosis: Acute hypoxemic respiratory failure  The primary encounter diagnosis was Pneumonia of both lungs due to infectious organism, unspecified part of lung. Diagnoses of Shortness of breath, Dyspnea, Cough, Long term (current) use of systemic steroids, Hypertension, essential, CHF, acute, Acute hypoxemic respiratory failure, Septic shock, Acute respiratory failure with hypoxia, KERMIT (acute kidney injury), ARDS (adult respiratory distress syndrome), Hyperkalemia, Hyperglycemia, and Leukocytosis, unspecified type were also pertinent to this visit.    Recommendations:                 General Recommendations:  Dysphagia therapy  Diet recommendations:  NPO, NPO   Aspiration Precautions: Frequent oral care, Ice chips sparingly, Meds crushed in puree and Strict aspiration precautions   General Precautions: Standard, aspiration, fall, respiratory  Communication strategies:  go to room if call light pushed and allow extra time for pt to communicate wants/needs    History:     Past Medical History:   Diagnosis Date    Anemia     Arthritis     Hashimoto's disease     Hypertension     IGT (impaired glucose tolerance) 1/12/2016    Joint pain     Multinodular goiter 1/12/2016    Stroke        Past Surgical History:   Procedure Laterality Date    CATARACT EXTRACTION      COLONOSCOPY N/A 9/29/2015    Procedure: COLONOSCOPY;  Surgeon: FIDELINA Sanchez MD;  Location: [facility] ([address] [place] [address]);  Service: Endoscopy;  Laterality: N/A;    EYE SURGERY      JOINT REPLACEMENT      right knee    KNEE SURGERY Left 12/31/2013    TKR    left parotidectomy      mixed tumor    SALIVARY GLAND SURGERY      TONSILLECTOMY         Social History: Patient lives at home in [place].  Her daughters have been staying with her since CVA (later 2017.)      Prior Intubation HX:  4/11/2018 - 4/22/2018    Modified Barium  "Swallow: no prior studies noted     Chest X-Rays:  4/23/1: Right-sided central venous catheter in the SVC.  Heart is not enlarged.  Ill-defined patchy airspace disease identified and the lung bases more on the right side and the subsegmental atelectatic changes also present.  No significant pleural effusion    Prior diet: Regular, thin     Occupation/hobbies/homemaking:  Retired, Physician. [employer]     Subjective     SLP reviewed Pt with nurse, nurse reports Pt receiving medications crushed in apple sauce  Pt presents calm, lethargic  She explains, "I really don't care for much more of that [apple sauce] but I'll do whatever ou need me to do."  Patient goals: to go home, to eat fresh fruit     Pain/Comfort:  · Pain Rating 1: 0/10  · Pain Rating Post-Intervention 1: 0/10    Objective:   Pt found awake in bed, Daughter at bedside. Daughter on phone t/o most of session.     Oral Musculature Evaluation  · Oral Musculature: general weakness  · Dentition: present and adequate  · Secretion Management: adequate  · Mucosal Quality: dry  · Mandibular Strength and Mobility: impaired (generalized weakness )  · Oral Labial Strength and Mobility: functional seal, impaired pursing  · Lingual Strength and Mobility: impaired strength, functional protrusion, functional lateral movement  · Volitional Cough: elicited, productive   · Volitional Swallow: elicited, inconsistent timing of initiation noted across trials via digital palpation of the larynx   · Voice Prior to PO Intake: mildly hoarse, mildly decreased articulatory precision     Bedside Swallow Eval:   Consistencies Assessed: fed by clinician 2/2 decreased BUE mobility  · Thin liquids ice chips x2, tsp sipx1, cup edge sipsx2  · Nectar thick liquids tsp sipsx4, cup edge sipx1  · Puree 1/2 tsp bites x2, full tsp bite x1     Oral Phase:   · mouth breathing  · Decreased closure around utensil  · Dry mouth    Pharyngeal Phase:   · inconsistent timing of initiation noted "   · multiple spontaneous swallows  · throat clearing    Compensatory Strategies  · Effortful swallow    Treatment: Oral care provided 2/2 coated, dry lingual surface. Pt with mildly hoarse vocal quality s/p extubation. Pt with drop in SaO2 to 72% following cup edge sip nectar-thickened liquids. Pt with consistent throat clearing immediately following presentations of thin liquids. Pt with delayed cough following trial of puree x1.  SLP notes increased mouth breathing as session progresses. SLP educates Pt and Daughter on SLP role, S/S aspiration, aspiration precautions and need for ongoing swallow assessment. Patient verbalizes understanding. Daughter on phone t/o SLP education and not able to verbalize understanding. Pts whiteboard updated. Findings reviewed with nurse and MD team.     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  She presents with overt S/S aspiration with trials of thin and nectar-thickened liquids. Patient with increased open-mouth breathing posture and increased fatigue as session progressed.  Patient would benefit from ongoing swallow assessment to determine safest, least restrictive means of nutrition/hydration.  Thank you.     Goals:    SLP Goals        Problem: SLP Goal    Goal Priority Disciplines Outcome   SLP Goal     SLP Ongoing (interventions implemented as appropriate)   Description:  Speech Language Pathology Goals  Goals expected to be met by 4/30  1. Patient will participate in ongoing swallow assessment                       Plan:     · Patient to be seen:  5 x/week   · Plan of Care expires:  05/23/18  · Plan of Care reviewed with:  patient, daughter   · SLP Follow-Up:  Yes       Discharge recommendations:  nursing facility, skilled   Barriers to Discharge:  Level of Skilled Assistance Needed     Time Tracking:     SLP Treatment Date:   04/23/18  Speech Start Time:  1150  Speech Stop Time:  1210     Speech Total Time (min):  20  min    Billable Minutes: Eval Swallow and Oral Function 20    LENORA Ray., [facility]-SLP  Speech-Language Pathology  Pager: [phone]    04/23/2018

## 2018-04-23 NOTE — PT/OT/SLP RE-EVAL
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  She presents with the following impairments/functional limitations:  weakness, impaired endurance, impaired self care skills, impaired functional mobilty, impaired balance, decreased upper extremity function, decreased lower extremity function, decreased coordination, impaired cardiopulmonary response to activity, edema. Pt required total A for all functional mobility. Pt would benefit from a SNF upon d/c to maximize functional mobility and ensure safety     Rehab Prognosis:  good; patient would benefit from acute skilled PT services to address these deficits and reach maximum level of function.      Recent Surgery: * No surgery found *      Plan:     During this hospitalization, patient to be seen 4 x/week to address the above listed problems via gait training, therapeutic activities, therapeutic exercises, neuromuscular re-education  · Plan of Care Expires:  05/13/18   Plan of Care Reviewed with: patient    Subjective     Communicated with RN prior to session.  Patient found in bed upon PT entry to room, agreeable to evaluation.      Chief Complaint: weakness  Patient comments/goals: to get better and return home   Pain/Comfort:  · Pain Rating 1: 0/10  · Pain Rating Post-Intervention 1: 0/10    Patients cultural, spiritual, Islam conflicts given the current situation: none reported       Objective:     Patient found with: telemetry, pulse ox (continuous), blood pressure cuff, oxygen, central line, parikh catheter     General Precautions: Standard,  fall   Orthopedic Precautions:N/A   Braces: N/A     Exams:  · RLE ROM: max decrease in AROM   · RLE Strength: grossly 1 to 2-  · LLE ROM: max decrease in AROM  · LLE Strength: grossly 1 to 2-    Functional Mobility:  · Bed Mobility:     · Rolling to R: total assistance  · Rolling to L: total assistance   · Scooting: total assistance  · Supine to Sit: total assistance  · Sit to Supine: total assistance and of 2 persons  · Transfers: not performed   · Gait: not performed     AM-PAC 6 CLICK MOBILITY  Total Score:8       Therapeutic Activities and Exercises:  Educated pt on PT role/POC  Educated pt on importance of OOB activity  Educated pt on safety with functional mobility  PT gave pt putty for hand squeezes (requested from RN)  Pt verbalized understanding    Sitting EOB x 5 minutes with total A to increase tolerance to OOB activity and to create optimal positioning for lung expansion     B UE and B LE AROM  Hand squeezes x 10 reps  Ankle pumps x 10 reps  · Pt instructed to perform hand squeezes and ankle pumps throughout the day     Rolling to L and R x 2 trials with total A to assist RN with marialuisa cleaning, sheet change, and positioning     Patient left HOB elevated with all lines intact, call button in reach and RN notified.    GOALS:    Physical Therapy Goals        Problem: Physical Therapy Goal    Goal Priority Disciplines Outcome Goal Variances Interventions   Physical Therapy Goal     PT/OT, PT Ongoing (interventions implemented as appropriate)     Description:  Goals to be met by: 18     Patient will increase functional independence with mobility by performin. Supine to sit with Moderate Assistance - not met  2. Sit to supine with Moderate Assistance - not met  3. Sit to stand transfer with Moderate Assistance - not met  4. Bed to chair transfer with Moderate Assistance using LRAD  5. Gait  x 20 feet with Moderate Assistance using LRAD    6. Ascend/descend 5 stair with bilateral Handrails Moderate  Assistance using Single-point Cane .   7. Lower extremity exercise program x20 reps per handout, with supervision                       History:     Past Medical History:   Diagnosis Date    Anemia     Arthritis     Hashimoto's disease     Hypertension     IGT (impaired glucose tolerance) 1/12/2016    Joint pain     Multinodular goiter 1/12/2016    Stroke        Past Surgical History:   Procedure Laterality Date    CATARACT EXTRACTION      COLONOSCOPY N/A 9/29/2015    Procedure: COLONOSCOPY;  Surgeon: FIDELINA Sanchez MD;  Location: [address]);  Service: Endoscopy;  Laterality: N/A;    EYE SURGERY      JOINT REPLACEMENT      right knee    KNEE SURGERY Left 12/31/2013    TKR    left parotidectomy      mixed tumor    SALIVARY GLAND SURGERY      TONSILLECTOMY         Time Tracking:     PT Received On: 04/23/18  PT Start Time: 1007     PT Stop Time: 1033  PT Total Time (min): 26 min     Billable Minutes: Re-eval 10 and Therapeutic Activity 16      Xiao Preciado, PT, DPT  4/23/2018  [id number]

## 2018-04-23 NOTE — RESIDENT HANDOFF
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 She was seen by her PCP on 4/2 for these complaints and received a prescription for duo nebs and tessalon perles. CXR performed on 4/2 noted bilateral basilar infiltrates. At that time, she denied fever, chills, sputum production, sick contacts. She was admitted to  and treated emprically for CAP with rocephin and azithromycin. She was also diuresed daily. However, her oxygen requirements slowly increased from 2 L NC to 4 L NC with also progressively worsening bilateral infiltrates on imaging. CTA performed on 4/9 negative for PE, however noted significant bilateral consolidations. On 4/11, her hypoxemia continued to worsen, requiring NRB mask, and she developed severe respiratory distress. She was transferred to the MICU and emergently intubated. Repeat CT chest performed on 4/11 demonstrated progressive worsening of bilateral peripheral infiltrates.    Hospital/ICU Course:  Admitted to MICU on 4/11, intubated emergently for severe hypoxemia and respiratory distress. ID was consulted for assistance in management given immunocompromised state. Patient paralyzed with nimbex following persistent dysynchrony with vent despite sedation. Abx broadened to vancomycin, zosyn, bactrim, and posaconazole. Norepinephrine initiated for BP support. Bronchoscopy performed at bedside on 4/11 and all cultures including fungal cultures negative. Nimbex discontinued on 4/12. She  continued to require significant oxygen support thereafter, and was unable to wean oxygen requirements significantly since intubation. However, she has made slow improvements in her oxygenation since 4/18. She went into A-fib RVR in 120-160's on the evening of 4/17, and was briefly started on amiodarone gtt; this discontinued and metoprolol added with good rate control on 4/18. SAT/SBT continue daily with difficulty weaning her off fentanyl as she gets agitated tachycardic and hypertensive ('s). Valium initiated in effort to wean fentanyl off. Patient extubated on 4/22 to nasal cannula Today she is much improved and ready for stepdown      To Follow Up:     Urine Output  Swallow evaluation  PT/OT  Monitor leukocytosis    Discharge Plan:     Rehab/Skilled    Call with questions.

## 2018-04-23 NOTE — SUBJECTIVE & OBJECTIVE
Interval History/Significant Events: No acute events overnight    Review of Systems   Constitutional: Positive for appetite change and fatigue.   Respiratory: Positive for cough and shortness of breath.    Gastrointestinal:        Fecal incontinence device   Genitourinary:        Hill   Neurological: Positive for weakness.   Psychiatric/Behavioral: Negative.      Objective:     Vital Signs (Most Recent):  Temp: 97.7 °F (36.5 °C) (04/23/18 0715)  Pulse: 92 (04/23/18 0800)  Resp: (!) 1 (04/23/18 0800)  BP: (!) 150/69 (04/23/18 0800)  SpO2: (!) 94 % (04/23/18 0808) Vital Signs (24h Range):  Temp:  [97.5 °F (36.4 °C)-98.4 °F (36.9 °C)] 97.7 °F (36.5 °C)  Pulse:  [72-92] 92  Resp:  [1-50] 1  SpO2:  [87 %-100 %] 94 %  BP: (113-179)/(54-80) 150/69  Arterial Line BP: (123-163)/(49-66) 157/61   Weight: 71.5 kg (157 lb 10.1 oz)  Body mass index is 26.23 kg/m².      Intake/Output Summary (Last 24 hours) at 04/23/18 0916  Last data filed at 04/23/18 0800   Gross per 24 hour   Intake            90.68 ml   Output             1371 ml   Net         -1280.32 ml       Physical Exam   Constitutional: She appears well-developed. She is cooperative.   HENT:   Head: Normocephalic.   Eyes: Pupils are equal, round, and reactive to light.   Neck: Trachea normal.   Cardiovascular: Regular rhythm and normal pulses.    Cool right hand. Pulses palpable  Swelling of both hands right >left   Pulmonary/Chest: Breath sounds normal. Tachypnea noted.   Abdominal: Soft. Bowel sounds are normal. There is no tenderness.   Musculoskeletal: Normal range of motion.   Weakness   Neurological: She is alert. She has normal strength. GCS eye subscore is 4. GCS verbal subscore is 5. GCS motor subscore is 6.   Skin: Skin is warm, dry and intact.   Protective dressings on heels. No lesions noted on heels or sacrum   Nursing note and vitals reviewed.      Vents:  Vent Mode: A/C (04/22/18 1121)  Set Rate: 16 bmp (04/22/18 1121)  Vt Set: 600 mL (04/22/18  1121)  Pressure Support: 0 cmH20 (04/22/18 1121)  PEEP/CPAP: 5 cmH20 (04/22/18 1121)  Oxygen Concentration (%): 32 (04/23/18 0508)  Peak Airway Pressure: 20 cmH2O (04/22/18 1121)  Plateau Pressure: 19 cmH20 (04/22/18 1121)  Total Ve: 9.86 mL (04/22/18 1121)  F/VT Ratio<105 (RSBI): (!) 80 (04/22/18 0730)  Lines/Drains/Airways     Central Venous Catheter Line                 Percutaneous Central Line Insertion/Assessment - triple lumen  04/11/18 1513 right internal jugular 11 days          Drain                 Rectal Tube 04/16/18 0600 rectal tube w/ balloon (indicate number of mLs) 7 days         Urethral Catheter 04/22/18 0500 1 day              Significant Labs:    CBC/Anemia Profile:    Recent Labs  Lab 04/22/18  0348 04/23/18  0235   WBC 18.98* 22.91*   HGB 8.8* 8.5*   HCT 29.3* 28.6*    284   MCV 82 80*   RDW 18.5* 18.3*        Chemistries:    Recent Labs  Lab 04/22/18  0348 04/22/18  0741 04/22/18  2043 04/23/18  0235   NA  --  149* 152* 148*   K  --  4.2 4.0 4.0   CL  --  113* 115* 113*   CO2  --  29 30* 28   BUN  --  65* 60* 59*   CREATININE  --  0.9 0.8 0.8   CALCIUM  --  8.9 9.2 8.4*   ALBUMIN 1.8*  --   --  1.9*   PROT 5.7*  --   --  5.3*   BILITOT 0.5  --   --  0.6   ALKPHOS 67  --   --  58   ALT 12  --   --  16   AST 11  --   --  16   MG 2.2  --   --  2.3   PHOS 4.0  --   --  3.4       All pertinent labs within the past 24 hours have been reviewed.    Significant Imaging:  I have reviewed and interpreted all pertinent imaging results/findings within the past 24 hours.

## 2018-04-23 NOTE — ASSESSMENT & PLAN NOTE
--Appreciate Rheumatology assistance.  --Continue hydroxychloroquine.  --Hold home prednisone  -- switched to IV SoluMedrol 80mg QD.

## 2018-04-23 NOTE — PLAN OF CARE
Problem: Patient Care Overview  Goal: Plan of Care Review  Outcome: Ongoing (interventions implemented as appropriate)  VSS within the shift. Patient is conscious and coherent. On sinus rhythm. No hypotension noted. O2 support provided with 3lpm via nasal cannula. No desaturations noted. Patient passed swallow screen. Awaiting for diet revision orders. FC and Flexiseal in place (see flowsheet for amount and characteristics of output). POC discussed with patient and daughter. Will continue to monitor.

## 2018-04-23 NOTE — CONSULTS
Midline placed in right brachial, size [id number] Lot# [id number] Removal date on or before 5/22/18 Needle advanced into vessel with real time ultrasound guidance. Image recorded and saved.

## 2018-04-23 NOTE — PLAN OF CARE
Problem: Physical Therapy Goal  Goal: Physical Therapy Goal  Goals to be met by: 18     Patient will increase functional independence with mobility by performin. Supine to sit with Moderate Assistance - not met  2. Sit to supine with Moderate Assistance - not met  3. Sit to stand transfer with Moderate Assistance - not met  4. Bed to chair transfer with Moderate Assistance using LRAD  5. Gait  x 20 feet with Moderate Assistance using LRAD    6. Ascend/descend 5 stair with bilateral Handrails Moderate Assistance using Single-point Cane .   7. Lower extremity exercise program x20 reps per handout, with supervision     Outcome: Ongoing (interventions implemented as appropriate)  Re-eval completed. Date and goals updated. Goals appropriate

## 2018-04-23 NOTE — PLAN OF CARE
Problem: Patient Care Overview  Goal: Plan of Care Review  Outcome: Ongoing (interventions implemented as appropriate)  POC reviewed with patient, patient daughter, & patient sister at bedside. Patient extubated today to nasal cannula 2-3 L. Patient tolerating well; respiratory status stable. Patient failed bedside SCOOBY; SLP eval placed. Right radial arterial line removed; site CDI. Skin CDI. Hill & Flexi in place. Will continue to monitor patient.

## 2018-04-23 NOTE — ASSESSMENT & PLAN NOTE
-- Etiology unclear  --ID recommend  ---- complete 7 day zosyn course as planned today for possible HAP/aspiration   ---- continue empiric posaconazole (mold species coverage)   ---- await pending fungal cultures; all fungal markers are negative  ----  bactrim prophylaxis transitioned to atovaquone given recent KERMIT  ---  continue steroids for now (cannot definitively exclude /HP)  ----  await pending BAL cultures    ---- await pending repeat cultures from 4/19 to exclude new nosocomial infection -NGTD

## 2018-04-23 NOTE — ASSESSMENT & PLAN NOTE
--Suspect secondary to severe bilateral pneumonia in the setting of immunosuppression; possibly  vs reaction from Humira   --Severely worsened over first week of admission despite CAP treatment.  --s/p emergent intubation on 4/11 upon MICU admission for severe hypoxemia and severe respiratory distress.  --Bronchoscopy on 4/11 with wash; culture with few WBCs, no organisms.  --Antibiotics broadened to vancomycin, zosyn, bactrim, and posaconazole initally; currently continuing posaconazole and atovoquone   --Blood, sputum, and urine cultures negative from 4/10 and 4/18  --KOH prep negative, AFB stain with no acid fast bacilli noted, fungal culture negative  --Aspergillus, Histoplasma, and Cryptococcal antigen in blood is negative. Legionella and blasomyces negative.   --Continue IV steroids for possibility of    --Extubated on 4/22

## 2018-04-23 NOTE — ASSESSMENT & PLAN NOTE
--Remote lacunar infarcts in the right centrum semi-ovale, basal ganglia, and right tatyana per CTH w/o contrast on 12/2017.  --Continue home clopidogrel.  -- PT/OT

## 2018-04-23 NOTE — PLAN OF CARE
Patient extubated and tolerating well.  Plan for steroid taper and transfer from ICU.  PT/OT re-eval orders placed, awaiting recs.  CM will continue to follow.     04/23/18 1248   Right Care Assessment   Can the patient answer the patient profile reliably? Yes, cognitively intact   How often would a person be available to care for the patient? Often   Describe the patient's ability to walk at the present time. Major restrictions/daily assistance from another person   How does the patient rate their overall health at the present time? Good   Number of comorbid conditions (as recorded on the chart) One   During the past month, has the patient often been bothered by feeling down, depressed or hopeless? No   During the past month, has the patient often been bothered by little interest or pleasure in doing things? No   Kayleen Arellano RN, BSN, CCM  Case Management  [facility]  Ext. [phone]

## 2018-04-23 NOTE — PROGRESS NOTES
"  [facility]  Adult Nutrition  Consult Note    SUMMARY     Recommendations    1. If able to advance diet, recommend regular diet (texture per SLP).   2. If unable to advance diet, resume enteral nutrition. Recommend Glucerna 1.5 @ 40 mL/hr to provide 1440 calories, 79 g of protein, 729 mL fluid.   3. RD to monitor & follow-up.    Goals: Meet % EEN, EPN  Nutrition Goal Status: new  Communication of RD Recs: reviewed with RN    Reason for Assessment    Reason for Assessment: RD follow-up  Diagnosis: other (see comments) (respiratory failure)  Relevant Medical History: RA, anemia, arthritis, HTN, stroke, hasimoto's  Interdisciplinary Rounds: attended    General Information Comments: Pt extubated 4/22, ST to follow-up today to assess swallowing ability.   Nutrition Discharge Planning: Unable to determine    Nutrition/Diet History    Patient Reported Diet/Restrictions/Preferences: other (see comments) (KERRY)  Typical Food/Fluid Intake: Pt remains NPO.  Food Preferences: No Amish/cultural food preferences at this time  Do you have any cultural, spiritual, Amish conflicts, given your current situation?: none stated  Factors Affecting Nutritional Intake: NPO    Anthropometrics    Temp: 97.7 °F (36.5 °C)  Height Method: Stated  Height: 5' 5" (165.1 cm)  Height (inches): 65 in  Weight Method: Bed Scale  Weight: 71.5 kg (157 lb 10.1 oz)  Weight (lb): 157.63 lb  Ideal Body Weight (IBW), Female: 125 lb  % Ideal Body Weight, Female (lb): 126.1 lb  BMI (Calculated): 26.3  BMI Grade: 25 - 29.9 - overweight    Lab/Procedures/Meds    Pertinent Labs Reviewed: reviewed  Pertinent Labs Comments: Na 148, BUN 59, Gluc 141, A1C 5.5  Pertinent Medications Reviewed: reviewed  Pertinent Medications Comments: -    Physical Findings/Assessment    Overall Physical Appearance: nourished  Skin: intact    Estimated/Assessed Needs    Weight Used For Calorie Calculations: 68 kg (149 lb 14.6 oz) (Dosing wt)  Energy " Calorie Requirements (kcal): 1438 kcal/d  Energy Need Method: Mingo-St Jeor (1.25 PAL)     Protein Requirements: 82 g/d (1.2 g/kg)  Weight Used For Protein Calculations: 68 kg (149 lb 14.6 oz)     Fluid Requirements (mL): 1mL/kcal or per MD    Nutrition Prescription Ordered    Current Diet Order: NPO  Current Nutrition Support Formula Ordered: Discontinued     Nutrition Risk    Level of Risk/Frequency of Follow-up: high     Assessment and Plan    Nutrition Problem  Inadequate energy intake     Related to (etiology):   TF provision     Signs and Symptoms (as evidenced by):   Pt receiving <85% EEN and EPN.      Nutrition Diagnosis Status:   Continues      Monitor and Evaluation    Food and Nutrient Intake: energy intake, food and beverage intake, enteral nutrition intake  Food and Nutrient Adminstration: diet order, enteral and parenteral nutrition administration  Physical Activity and Function: nutrition-related ADLs and IADLs  Anthropometric Measurements: weight, weight change  Biochemical Data, Medical Tests and Procedures: inflammatory profile, lipid profile, glucose/endocrine profile, gastrointestinal profile, electrolyte and renal panel  Nutrition-Focused Physical Findings: overall appearance     Nutrition Follow-Up    RD Follow-up?: Yes

## 2018-04-23 NOTE — PLAN OF CARE
Problem: SLP Goal  Goal: SLP Goal  Speech Language Pathology Goals  Goals expected to be met by 4/30  1. Patient will participate in ongoing swallow assessment     Outcome: Ongoing (interventions implemented as appropriate)  Bedside swallow study initiated. Please see report for full details. REC: NPO, medications crushed in puree, ice chips sparingly, frequent oral care, strict aspiration precautions and ST to continue to follow. Findings reviewed with Pt, Nurse and MD team. Thank you.    LENORA Ray., [facility]-SLP  Speech-Language Pathology  Pager: [phone]  4/23/2018

## 2018-04-23 NOTE — PROGRESS NOTES
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 She was seen by her PCP on 4/2 for these complaints and received a prescription for duo nebs and tessalon perles. CXR performed on 4/2 noted bilateral basilar infiltrates. At that time, she denied fever, chills, sputum production, sick contacts. She was admitted to  and treated emprically for CAP with rocephin and azithromycin. She was also diuresed daily. However, her oxygen requirements slowly increased from 2 L NC to 4 L NC with also progressively worsening bilateral infiltrates on imaging. CTA performed on 4/9 negative for PE, however noted significant bilateral consolidations. On 4/11, her hypoxemia continued to worsen, requiring NRB mask, and she developed severe respiratory distress. She was transferred to the MICU and emergently intubated. Repeat CT chest performed on 4/11 demonstrated progressive worsening of bilateral peripheral infiltrates.    Hospital/ICU Course:  Admitted to MICU on 4/11, intubated emergently for severe hypoxemia and respiratory distress. ID was consulted for assistance in management given immunocompromised state. Patient paralyzed with nimbex following persistent dysynchrony with vent despite sedation. Abx broadened to vancomycin, zosyn, bactrim, and posaconazole. Norepinephrine initiated for BP support.  Bronchoscopy performed at bedside on 4/11 and all cultures including fungal cultures negative. Nimbex discontinued on 4/12. She continued to require significant oxygen support thereafter, and was unable to wean oxygen requirements significantly since intubation. However, she has made slow improvements in her oxygenation since 4/18. She went into A-fib RVR in 120-160's on the evening of 4/17, and was briefly started on amiodarone gtt; this discontinued and metoprolol added with good rate control on 4/18. SAT/SBT continue daily with difficulty weaning her off fentanyl as she gets agitated tachycardic and hypertensive ('s). Valium initiated in effort to wean fentanyl off. Patient extubated on 4/22 to nasal cannula    Interval History/Significant Events: No acute events overnight    Review of Systems   Constitutional: Positive for appetite change and fatigue.   Respiratory: Positive for cough and shortness of breath.    Gastrointestinal:        Fecal incontinence device   Genitourinary:        Hill   Neurological: Positive for weakness.   Psychiatric/Behavioral: Negative.      Objective:     Vital Signs (Most Recent):  Temp: 97.7 °F (36.5 °C) (04/23/18 0715)  Pulse: 92 (04/23/18 0800)  Resp: (!) 1 (04/23/18 0800)  BP: (!) 150/69 (04/23/18 0800)  SpO2: (!) 94 % (04/23/18 0808) Vital Signs (24h Range):  Temp:  [97.5 °F (36.4 °C)-98.4 °F (36.9 °C)] 97.7 °F (36.5 °C)  Pulse:  [72-92] 92  Resp:  [1-50] 1  SpO2:  [87 %-100 %] 94 %  BP: (113-179)/(54-80) 150/69  Arterial Line BP: (123-163)/(49-66) 157/61   Weight: 71.5 kg (157 lb 10.1 oz)  Body mass index is 26.23 kg/m².      Intake/Output Summary (Last 24 hours) at 04/23/18 0916  Last data filed at 04/23/18 0800   Gross per 24 hour   Intake            90.68 ml   Output             1371 ml   Net         -1280.32 ml       Physical Exam   Constitutional: She appears well-developed. She is cooperative.   HENT:   Head: Normocephalic.   Eyes: Pupils are equal, round, and  reactive to light.   Neck: Trachea normal.   Cardiovascular: Regular rhythm and normal pulses.    Cool right hand. Pulses palpable  Swelling of both hands right >left   Pulmonary/Chest: Breath sounds normal. Tachypnea noted.   Abdominal: Soft. Bowel sounds are normal. There is no tenderness.   Musculoskeletal: Normal range of motion.   Weakness   Neurological: She is alert. She has normal strength. GCS eye subscore is 4. GCS verbal subscore is 5. GCS motor subscore is 6.   Skin: Skin is warm, dry and intact.   Protective dressings on heels. No lesions noted on heels or sacrum   Nursing note and vitals reviewed.      Vents:  Vent Mode: A/C (04/22/18 1121)  Set Rate: 16 bmp (04/22/18 1121)  Vt Set: 600 mL (04/22/18 1121)  Pressure Support: 0 cmH20 (04/22/18 1121)  PEEP/CPAP: 5 cmH20 (04/22/18 1121)  Oxygen Concentration (%): 32 (04/23/18 0508)  Peak Airway Pressure: 20 cmH2O (04/22/18 1121)  Plateau Pressure: 19 cmH20 (04/22/18 1121)  Total Ve: 9.86 mL (04/22/18 1121)  F/VT Ratio<105 (RSBI): (!) 80 (04/22/18 0730)  Lines/Drains/Airways     Central Venous Catheter Line                 Percutaneous Central Line Insertion/Assessment - triple lumen  04/11/18 1513 right internal jugular 11 days          Drain                 Rectal Tube 04/16/18 0600 rectal tube w/ balloon (indicate number of mLs) 7 days         Urethral Catheter 04/22/18 0500 1 day              Significant Labs:    CBC/Anemia Profile:    Recent Labs  Lab 04/22/18  0348 04/23/18  0235   WBC 18.98* 22.91*   HGB 8.8* 8.5*   HCT 29.3* 28.6*    284   MCV 82 80*   RDW 18.5* 18.3*        Chemistries:    Recent Labs  Lab 04/22/18  0348 04/22/18  0741 04/22/18  2043 04/23/18  0235   NA  --  149* 152* 148*   K  --  4.2 4.0 4.0   CL  --  113* 115* 113*   CO2  --  29 30* 28   BUN  --  65* 60* 59*   CREATININE  --  0.9 0.8 0.8   CALCIUM  --  8.9 9.2 8.4*   ALBUMIN 1.8*  --   --  1.9*   PROT 5.7*  --   --  5.3*   BILITOT 0.5  --   --  0.6   ALKPHOS 67  --   --   58   ALT 12  --   --  16   AST 11  --   --  16   MG 2.2  --   --  2.3   PHOS 4.0  --   --  3.4       All pertinent labs within the past 24 hours have been reviewed.    Significant Imaging:  I have reviewed and interpreted all pertinent imaging results/findings within the past 24 hours.    Assessment/Plan:     Neuro   Cerebrovascular accident (CVA)    --Remote lacunar infarcts in the right centrum semi-ovale, basal ganglia, and right tatyana per CTH w/o contrast on 12/2017.  --Continue home clopidogrel.  -- PT/OT        Pulmonary   * Acute hypoxemic respiratory failure    --Suspect secondary to severe bilateral pneumonia in the setting of immunosuppression; possibly  vs reaction from Humira   --Severely worsened over first week of admission despite CAP treatment.  --s/p emergent intubation on 4/11 upon MICU admission for severe hypoxemia and severe respiratory distress.  --Bronchoscopy on 4/11 with wash; culture with few WBCs, no organisms.  --Antibiotics broadened to vancomycin, zosyn, bactrim, and posaconazole initally; currently continuing posaconazole and atovoquone   --Blood, sputum, and urine cultures negative from 4/10 and 4/18  --KOH prep negative, AFB stain with no acid fast bacilli noted, fungal culture negative  --Aspergillus, Histoplasma, and Cryptococcal antigen in blood is negative. Legionella and blasomyces negative.   --Continue IV steroids for possibility of    --Extubated on 4/22          ARDS (adult respiratory distress syndrome)    --see above        Pneumonia of both upper lobes due to infectious organism    --see above  -- CXR today        Cardiac/Vascular   Hypertension, essential    --continue to hold htn meds as patient normotensive        Renal/   Hypernatremia    --trend BMP's.   --restart diet after swallow study        Oncology   Leukocytosis    -- Etiology unclear  --ID recommend  ---- complete 7 day zosyn course as planned today for possible HAP/aspiration   ---- continue empiric  posaconazole (mold species coverage)   ---- await pending fungal cultures; all fungal markers are negative  ----  bactrim prophylaxis transitioned to atovaquone given recent KERMIT  ---  continue steroids for now (cannot definitively exclude /HP)  ----  await pending BAL cultures    ---- await pending repeat cultures from 4/19 to exclude new nosocomial infection -NGTD        Endocrine   Hyperglycemia    --Likely steroid-induced.  --SQ insulin while patient NPO, pending bedside swallow study  --Goal -180.        Thyroid enlargement    --History of Hashimoto's thyroiditis.  --Intrathoracic extension on CT.  --TSH and free T4 wnl.          GI   PUD (peptic ulcer disease)    --Continue PPI.        Orthopedic   Seropositive rheumatoid arthritis of multiple sites    --Appreciate Rheumatology assistance.  --Continue hydroxychloroquine.  --Hold home prednisone  -- switched to IV SoluMedrol 80mg QD.        Other   Long term (current) use of systemic steroids    --see above           Critical Care Daily Checklist:    A: Awake: RASS Goal/Actual Goal: RASS Goal: 0-->alert and calm  Actual: Monzon Agitation Sedation Scale (RASS): Alert and calm   B: Spontaneous Breathing Trial Performed?     C: SAT & SBT Coordinated?  NA                    D: Delirium: CAM-ICU Overall CAM-ICU: Negative   E: Early Mobility Performed? Yes   F: Feeding Goal: Goals: Meet % EEN, EPN  Status: Nutrition Goal Status: new   Current Diet Order   Procedures    Diet NPO Except for: Medication     Order Specific Question:   Except for     Answer:   Medication      AS: Analgesia/Sedation NA   T: Thromboembolic Prophylaxis Heparin   H: HOB > 300 Yes   U: Stress Ulcer Prophylaxis (if needed) Protonix   G: Glucose Control Prn SSI   B: Bowel Function Stool Occurrence: 1   I: Indwelling Catheter (Lines & Hill) Necessity CVL- day #11  Hill - Day #1- may remove  Rectal tube- removed   D: De-escalation of Antimicrobials/Pharmacotherapies NA    Plan  for the day/ETD Possible stepdown  PT/OT    Code Status:  Family/Goals of Care: Full Code  Family updated at bedside     I spent >70 minutes reviewing patient records, examining, and counseling the patient with greater than 50% of the time spent with direct patient care and coordination.      Fiona Winterbottom, APRN, CNS  Critical Care Medicine  [facility]

## 2018-04-24 PROBLEM — R06.00 DYSPNEA: Status: RESOLVED | Noted: 2018-04-23 | Resolved: 2018-04-24

## 2018-04-24 PROBLEM — R73.9 HYPERGLYCEMIA: Status: RESOLVED | Noted: 2018-04-12 | Resolved: 2018-04-24

## 2018-04-24 PROBLEM — R05.9 COUGH: Status: RESOLVED | Noted: 2018-04-23 | Resolved: 2018-04-24

## 2018-04-24 PROBLEM — E87.6 HYPOKALEMIA: Status: ACTIVE | Noted: 2018-04-24

## 2018-04-24 LAB
ALBUMIN SERPL BCP-MCNC: 2.1 G/DL
ALP SERPL-CCNC: 59 U/L
ALT SERPL W/O P-5'-P-CCNC: 18 U/L
ANION GAP SERPL CALC-SCNC: 8 MMOL/L
ANISOCYTOSIS BLD QL SMEAR: SLIGHT
AST SERPL-CCNC: 16 U/L
BASOPHILS # BLD AUTO: 0.02 K/UL
BASOPHILS NFR BLD: 0.1 %
BILIRUB DIRECT SERPL-MCNC: 0.4 MG/DL
BILIRUB SERPL-MCNC: 0.7 MG/DL
BUN SERPL-MCNC: 51 MG/DL
BURR CELLS BLD QL SMEAR: ABNORMAL
CALCIUM SERPL-MCNC: 8.6 MG/DL
CHLORIDE SERPL-SCNC: 117 MMOL/L
CO2 SERPL-SCNC: 27 MMOL/L
CREAT SERPL-MCNC: 0.8 MG/DL
DACRYOCYTES BLD QL SMEAR: ABNORMAL
DIFFERENTIAL METHOD: ABNORMAL
EOSINOPHIL # BLD AUTO: 0.1 K/UL
EOSINOPHIL NFR BLD: 0.3 %
ERYTHROCYTE [DISTWIDTH] IN BLOOD BY AUTOMATED COUNT: 19.1 %
EST. GFR  (AFRICAN AMERICAN): >60 ML/MIN/1.73 M^2
EST. GFR  (NON AFRICAN AMERICAN): >60 ML/MIN/1.73 M^2
GIANT PLATELETS BLD QL SMEAR: PRESENT
GLUCOSE SERPL-MCNC: 79 MG/DL
HCT VFR BLD AUTO: 31.7 %
HGB BLD-MCNC: 9.4 G/DL
HYPOCHROMIA BLD QL SMEAR: ABNORMAL
IMM GRANULOCYTES # BLD AUTO: 0.25 K/UL
IMM GRANULOCYTES NFR BLD AUTO: 1.1 %
INR PPP: 1.3
LYMPHOCYTES # BLD AUTO: 1.3 K/UL
LYMPHOCYTES NFR BLD: 5.7 %
MAGNESIUM SERPL-MCNC: 2.1 MG/DL
MCH RBC QN AUTO: 24.3 PG
MCHC RBC AUTO-ENTMCNC: 29.7 G/DL
MCV RBC AUTO: 82 FL
MONOCYTES # BLD AUTO: 2.8 K/UL
MONOCYTES NFR BLD: 11.8 %
NEUTROPHILS # BLD AUTO: 18.9 K/UL
NEUTROPHILS NFR BLD: 81 %
NRBC BLD-RTO: 0 /100 WBC
OVALOCYTES BLD QL SMEAR: ABNORMAL
PHOSPHATE SERPL-MCNC: 2.4 MG/DL
PLATELET # BLD AUTO: 262 K/UL
PLATELET BLD QL SMEAR: ABNORMAL
PMV BLD AUTO: 11.4 FL
POCT GLUCOSE: 100 MG/DL (ref 70–110)
POCT GLUCOSE: 106 MG/DL (ref 70–110)
POCT GLUCOSE: 131 MG/DL (ref 70–110)
POCT GLUCOSE: 77 MG/DL (ref 70–110)
POCT GLUCOSE: 84 MG/DL (ref 70–110)
POIKILOCYTOSIS BLD QL SMEAR: SLIGHT
POLYCHROMASIA BLD QL SMEAR: ABNORMAL
POTASSIUM SERPL-SCNC: 3.2 MMOL/L
PROT SERPL-MCNC: 5.6 G/DL
PROTHROMBIN TIME: 12.7 SEC
RBC # BLD AUTO: 3.87 M/UL
SCHISTOCYTES BLD QL SMEAR: PRESENT
SODIUM SERPL-SCNC: 152 MMOL/L
TARGETS BLD QL SMEAR: ABNORMAL
WBC # BLD AUTO: 23.33 K/UL

## 2018-04-24 PROCEDURE — 97530 THERAPEUTIC ACTIVITIES: CPT

## 2018-04-24 PROCEDURE — 25000003 PHARM REV CODE 250: Performed by: NURSE PRACTITIONER

## 2018-04-24 PROCEDURE — 20600001 HC STEP DOWN PRIVATE ROOM

## 2018-04-24 PROCEDURE — 63600175 PHARM REV CODE 636 W HCPCS: Performed by: NURSE PRACTITIONER

## 2018-04-24 PROCEDURE — 85610 PROTHROMBIN TIME: CPT

## 2018-04-24 PROCEDURE — 92526 ORAL FUNCTION THERAPY: CPT

## 2018-04-24 PROCEDURE — 99233 SBSQ HOSP IP/OBS HIGH 50: CPT | Mod: GC,,, | Performed by: INTERNAL MEDICINE

## 2018-04-24 PROCEDURE — 94761 N-INVAS EAR/PLS OXIMETRY MLT: CPT

## 2018-04-24 PROCEDURE — 27000221 HC OXYGEN, UP TO 24 HOURS

## 2018-04-24 PROCEDURE — 97110 THERAPEUTIC EXERCISES: CPT

## 2018-04-24 PROCEDURE — 25000003 PHARM REV CODE 250: Performed by: STUDENT IN AN ORGANIZED HEALTH CARE EDUCATION/TRAINING PROGRAM

## 2018-04-24 PROCEDURE — 84100 ASSAY OF PHOSPHORUS: CPT

## 2018-04-24 PROCEDURE — 25000003 PHARM REV CODE 250: Performed by: CLINICAL NURSE SPECIALIST

## 2018-04-24 PROCEDURE — 80048 BASIC METABOLIC PNL TOTAL CA: CPT

## 2018-04-24 PROCEDURE — 80076 HEPATIC FUNCTION PANEL: CPT

## 2018-04-24 PROCEDURE — 85025 COMPLETE CBC W/AUTO DIFF WBC: CPT

## 2018-04-24 PROCEDURE — 83735 ASSAY OF MAGNESIUM: CPT

## 2018-04-24 RX ORDER — POTASSIUM CHLORIDE 20 MEQ/15ML
20 SOLUTION ORAL ONCE
Status: DISCONTINUED | OUTPATIENT
Start: 2018-04-24 | End: 2018-04-24

## 2018-04-24 RX ORDER — POTASSIUM CHLORIDE 750 MG/1
30 CAPSULE, EXTENDED RELEASE ORAL 3 TIMES DAILY
Status: DISCONTINUED | OUTPATIENT
Start: 2018-04-24 | End: 2018-04-24

## 2018-04-24 RX ORDER — SULFAMETHOXAZOLE AND TRIMETHOPRIM 800; 160 MG/1; MG/1
1 TABLET ORAL
Status: DISCONTINUED | OUTPATIENT
Start: 2018-04-25 | End: 2018-04-26

## 2018-04-24 RX ORDER — POTASSIUM CHLORIDE 20 MEQ/15ML
40 SOLUTION ORAL ONCE
Status: COMPLETED | OUTPATIENT
Start: 2018-04-24 | End: 2018-04-24

## 2018-04-24 RX ORDER — POTASSIUM CHLORIDE 7.45 MG/ML
10 INJECTION INTRAVENOUS
Status: COMPLETED | OUTPATIENT
Start: 2018-04-24 | End: 2018-04-24

## 2018-04-24 RX ORDER — PREDNISONE 20 MG/1
60 TABLET ORAL DAILY
Status: DISCONTINUED | OUTPATIENT
Start: 2018-04-24 | End: 2018-04-27

## 2018-04-24 RX ORDER — DEXTROSE MONOHYDRATE 50 MG/ML
INJECTION, SOLUTION INTRAVENOUS CONTINUOUS
Status: ACTIVE | OUTPATIENT
Start: 2018-04-24 | End: 2018-04-24

## 2018-04-24 RX ADMIN — DEXTROSE: 5 SOLUTION INTRAVENOUS at 09:04

## 2018-04-24 RX ADMIN — AMLODIPINE BESYLATE 5 MG: 5 TABLET ORAL at 09:04

## 2018-04-24 RX ADMIN — CLOPIDOGREL 75 MG: 75 TABLET, FILM COATED ORAL at 09:04

## 2018-04-24 RX ADMIN — METHYLPREDNISOLONE SODIUM SUCCINATE 80 MG: 125 INJECTION, POWDER, FOR SOLUTION INTRAMUSCULAR; INTRAVENOUS at 09:04

## 2018-04-24 RX ADMIN — POTASSIUM CHLORIDE 10 MEQ: 14.9 INJECTION, SOLUTION, CONCENTRATE PARENTERAL at 02:04

## 2018-04-24 RX ADMIN — HYDRALAZINE HYDROCHLORIDE 10 MG: 20 INJECTION INTRAMUSCULAR; INTRAVENOUS at 07:04

## 2018-04-24 RX ADMIN — HEPARIN SODIUM 5000 UNITS: 5000 INJECTION, SOLUTION INTRAVENOUS; SUBCUTANEOUS at 09:04

## 2018-04-24 RX ADMIN — POTASSIUM CHLORIDE 10 MEQ: 14.9 INJECTION, SOLUTION, CONCENTRATE PARENTERAL at 03:04

## 2018-04-24 RX ADMIN — HEPARIN SODIUM 5000 UNITS: 5000 INJECTION, SOLUTION INTRAVENOUS; SUBCUTANEOUS at 02:04

## 2018-04-24 RX ADMIN — PANTOPRAZOLE SODIUM 40 MG: 40 GRANULE, DELAYED RELEASE ORAL at 09:04

## 2018-04-24 RX ADMIN — PREDNISONE 60 MG: 20 TABLET ORAL at 10:04

## 2018-04-24 RX ADMIN — POTASSIUM CHLORIDE 10 MEQ: 14.9 INJECTION, SOLUTION, CONCENTRATE PARENTERAL at 01:04

## 2018-04-24 RX ADMIN — HYDROXYCHLOROQUINE SULFATE 400 MG: 200 TABLET, FILM COATED ORAL at 09:04

## 2018-04-24 RX ADMIN — POTASSIUM CHLORIDE 40 MEQ: 20 SOLUTION ORAL at 10:04

## 2018-04-24 RX ADMIN — Medication 12.5 MG: at 09:04

## 2018-04-24 RX ADMIN — POTASSIUM CHLORIDE 10 MEQ: 14.9 INJECTION, SOLUTION, CONCENTRATE PARENTERAL at 12:04

## 2018-04-24 NOTE — PROGRESS NOTES
Patient's mood was observed labile. Confusion to time place and situation noted upon assessment of orientation. Last blood sugar check was 77mg/dl. Patient verbalized refusal for a recheck. No noted urine output since removal of FC. Patient refused bladder scan for reassessment.  SBP was noted above 160mmHg. Patient refused to be given with hydralazine. Charge nurse and CCS informed of concerns.

## 2018-04-24 NOTE — NURSING TRANSFER
Nursing Transfer Note      4/24/2018     [facility]    Transfer via stretcher    Transfer with to O2, cardiac monitoring    Transported by RN X 1; PCT X 1    Medicines sent: YES    Chart send with patient: Yes    Notified: daughter (Susu)    Upon arrival to floor: Notified Poly daniel RN

## 2018-04-24 NOTE — SUBJECTIVE & OBJECTIVE
Interval History/Significant Events: See hospital course above.     Review of Systems   Constitutional: Positive for chills. Negative for fever and unexpected weight change.   HENT: Negative for hearing loss.    Eyes: Negative for visual disturbance.   Respiratory: Negative for shortness of breath and wheezing.    Cardiovascular: Positive for leg swelling. Negative for chest pain.   Gastrointestinal: Negative for abdominal pain, constipation, diarrhea, nausea and vomiting.   Genitourinary: Negative for dysuria.   Musculoskeletal: Negative for arthralgias.   Skin: Negative for rash.   Neurological: Negative for seizures.   Hematological: Negative for adenopathy. Does not bruise/bleed easily.   Psychiatric/Behavioral: Negative for agitation, behavioral problems and confusion.     Objective:     Vital Signs (Most Recent):  Temp: 97.4 °F (36.3 °C) (04/24/18 0000)  Pulse: 88 (04/24/18 0900)  Resp: (!) 28 (04/24/18 0900)  BP: (!) 146/65 (04/24/18 0900)  SpO2: 95 % (04/24/18 0906) Vital Signs (24h Range):  Temp:  [97.4 °F (36.3 °C)-98.7 °F (37.1 °C)] 97.4 °F (36.3 °C)  Pulse:  [74-88] 88  Resp:  [16-36] 28  SpO2:  [93 %-100 %] 95 %  BP: (143-178)/() 146/65   Weight: 71.5 kg (157 lb 10.1 oz)  Body mass index is 26.23 kg/m².      Intake/Output Summary (Last 24 hours) at 04/24/18 0931  Last data filed at 04/24/18 0900   Gross per 24 hour   Intake               55 ml   Output             1025 ml   Net             -970 ml       Physical Exam   Constitutional: She is oriented to person, place, and time. She appears well-developed and well-nourished.   HENT:   Head: Normocephalic and atraumatic.   Eyes: EOM are normal. Pupils are equal, round, and reactive to light.   Neck: No tracheal deviation present. No thyromegaly present.   Cardiovascular: Normal rate.    No murmur heard.  Pulmonary/Chest: Effort normal. She has no wheezes. She has no rales.   Abdominal: Bowel sounds are normal. There is no tenderness.    Musculoskeletal: She exhibits edema (BLLE 1+). She exhibits no deformity.   Neurological: She is alert and oriented to person, place, and time. She exhibits normal muscle tone.   Skin: No rash noted. No erythema.   Vitals reviewed.      Vents:  Vent Mode: A/C (04/22/18 1121)  Set Rate: 16 bmp (04/22/18 1121)  Vt Set: 600 mL (04/22/18 1121)  Pressure Support: 0 cmH20 (04/22/18 1121)  PEEP/CPAP: 5 cmH20 (04/22/18 1121)  Oxygen Concentration (%): 32 (04/23/18 0508)  Peak Airway Pressure: 20 cmH2O (04/22/18 1121)  Plateau Pressure: 19 cmH20 (04/22/18 1121)  Total Ve: 9.86 mL (04/22/18 1121)  F/VT Ratio<105 (RSBI): (!) 80 (04/22/18 0730)  Lines/Drains/Airways     Drain                 Urethral Catheter 04/24/18 0813 less than 1 day          Peripheral Intravenous Line                 Midline Catheter Insertion/Assessment  - Single Lumen 04/23/18 1300 Left brachial vein 18g x 8cm less than 1 day              Significant Labs:    CBC/Anemia Profile:    Recent Labs  Lab 04/23/18  0235 04/24/18  0235   WBC 22.91* 23.33*   HGB 8.5* 9.4*   HCT 28.6* 31.7*    262   MCV 80* 82   RDW 18.3* 19.1*        Chemistries:    Recent Labs  Lab 04/22/18  2043 04/23/18  0235 04/24/18  0235   * 148* 152*   K 4.0 4.0 3.2*   * 113* 117*   CO2 30* 28 27   BUN 60* 59* 51*   CREATININE 0.8 0.8 0.8   CALCIUM 9.2 8.4* 8.6*   ALBUMIN  --  1.9* 2.1*   PROT  --  5.3* 5.6*   BILITOT  --  0.6 0.7   ALKPHOS  --  58 59   ALT  --  16 18   AST  --  16 16   MG  --  2.3 2.1   PHOS  --  3.4 2.4*       ABGs: No results for input(s): PH, PCO2, HCO3, POCSATURATED, BE in the last 48 hours.  CMP:   Recent Labs  Lab 04/22/18 2043 04/23/18 0235 04/24/18  0235   * 148* 152*   K 4.0 4.0 3.2*   * 113* 117*   CO2 30* 28 27   * 141* 79   BUN 60* 59* 51*   CREATININE 0.8 0.8 0.8   CALCIUM 9.2 8.4* 8.6*   PROT  --  5.3* 5.6*   ALBUMIN  --  1.9* 2.1*   BILITOT  --  0.6 0.7   ALKPHOS  --  58 59   AST  --  16 16   ALT  --  16 18    ANIONGAP 7* 7* 8   EGFRNONAA >60.0 >60.0 >60.0       Significant Imaging:  I have reviewed all pertinent imaging results/findings within the past 24 hours.

## 2018-04-24 NOTE — PLAN OF CARE
Problem: SLP Goal  Goal: SLP Goal  Speech Language Pathology Goals  Goals expected to be met by 4/30  1. Patient will participate in ongoing swallow assessment      Outcome: Ongoing (interventions implemented as appropriate)  Patient with increased fatigue, moderately strained vocal quality, and S/S aspiration with trials presented at the bedside. Patient would benefit from further, instrumental assessment via MBSS to determine safety/feasibility of re-initiation of PO diet. REC: NPO and Modified Barium Swallow Study. Findings reviewed with Pt, Daughter, nurse and MD team. Orders received for MBSS, reviewed with Radiology and MBSS tentatively scheduled for 13:00 4/25/18. Thank you.    LENORA Ray., [facility]SL[facility]  Speech-Language Pathology  Pager: [phone]  4/24/2018

## 2018-04-24 NOTE — ASSESSMENT & PLAN NOTE
--Appreciate Rheumatology assistance.  --Continue hydroxychloroquine.  -- switched to prednisone 60mg PO QD.

## 2018-04-24 NOTE — PLAN OF CARE
Problem: Physical Therapy Goal  Goal: Physical Therapy Goal  Goals to be met by: 18     Patient will increase functional independence with mobility by performin. Supine to sit with Moderate Assistance - not met  2. Sit to supine with Moderate Assistance - not met  3. Sit to stand transfer with Moderate Assistance - not met  4. Bed to chair transfer with Moderate Assistance using LRAD  5. Gait  x 20 feet with Moderate Assistance using LRAD    6. Ascend/descend 5 stair with bilateral Handrails Moderate Assistance using Single-point Cane .   7. Lower extremity exercise program x20 reps per handout, with supervision      Outcome: Ongoing (interventions implemented as appropriate)  Treatment completed and no goals met. Goals appropriate.

## 2018-04-24 NOTE — PLAN OF CARE
Problem: Patient Care Overview  Goal: Plan of Care Review  Outcome: Ongoing (interventions implemented as appropriate)  No acute events throughout day. See vital signs and assessments in flowsheets. See below for updates on today's progress.     Pulmonary: Patient on 3L NC. Sats are 98%.     Cardiovascular: HR NSR 90. Blood pressure 146/66.     Neurological: AAOX3. At times confused to situation.     Gastrointestinal: +BS. 1 BM this shift.     Genitourinary: UOP for shift 1440 cc. Patient was retaining so Hill replaced this am.     Endocrine: Normoglycemic.    Integumentary/Other: Patient sat at edge of bed 2X today. 2 person assist.     Infusions: No current infusions.     Patient progressing towards goals as tolerated, plan of care communicated and reviewed with Selma Lux MD and family. All concerns addressed. Will continue to monitor.

## 2018-04-24 NOTE — ASSESSMENT & PLAN NOTE
81yo woman w/a history of prior CVA, RA (dx 2012; RF seropositive, erosive; on HCQ/prednisone 5mg and recently started humira first dose 3/22/2018), IBS, and Hasimotos thyroiditis (goiter) who was admitted on 4/5/2018 with 2 days of dry cough and progressive SOB due to multifocal PNA (with peripheral and basilar lesions noted on CT chest) that progressively worsened despite CAP coverage x5 days, requiring intubation on 4/12 for hypoxic RF. Seemed to slowly improve with expanded coverage vanco/pip/tazo and steroids also started on posaconazole Course c/b KERMIT (now resolved after empiric bactrim stopped with negative GMS) and AF w/RVR (converted with amiodarone). Etiology remains unknown despite her slow improvement and now extubation    - would stop posaconazole,all fungal markers negative and BAL cultures negative for pathogens (c albicans growing likely contamination/colonization)  -would recommend switching back to tmp/smx for PJP ppx as renal fxn improves  - will sign off for now please call back if questions  - discussed with team

## 2018-04-24 NOTE — PLAN OF CARE
Per MARILYN Beyer CM, states that pt probably will be a SNF admission and state paperwork needs to be in process.    SW spoke w/Rosy (Kana Rosado, dgtr - cell# [phone],who informed SW that prior to transfer to MICU, this pt was in the process of going to a SNF/Rehab. The family had not provided choices as pt was placed in MICU.  SW was referred by Susu to speak w/sister (Vania Devi) as she also has medical POA (2nd).  Per Vania, they have not made a formal selection but family would like [facility], and will give SW a 3rd selection once Vania speaks w/pt's daughters.      SW will completed PASRR/142.    Morris Underwood, EBONIE  Ext. [phone]

## 2018-04-24 NOTE — PROGRESS NOTES
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 She was seen by her PCP on 4/2 for these complaints and received a prescription for duo nebs and tessalon perles. CXR performed on 4/2 noted bilateral basilar infiltrates. At that time, she denied fever, chills, sputum production, sick contacts. She was admitted to  and treated emprically for CAP with rocephin and azithromycin. She was also diuresed daily. However, her oxygen requirements slowly increased from 2 L NC to 4 L NC with also progressively worsening bilateral infiltrates on imaging. CTA performed on 4/9 negative for PE, however noted significant bilateral consolidations. On 4/11, her hypoxemia continued to worsen, requiring NRB mask, and she developed severe respiratory distress. She was transferred to the MICU and emergently intubated. Repeat CT chest performed on 4/11 demonstrated progressive worsening of bilateral peripheral infiltrates.    Hospital/ICU Course:  Admitted to MICU on 4/11, intubated emergently for severe hypoxemia and respiratory distress. ID was consulted for assistance in management given immunocompromised state. Patient paralyzed with nimbex following persistent dysynchrony with vent despite sedation. Abx broadened to vancomycin, zosyn, bactrim, and posaconazole. Norepinephrine initiated for BP support.  Bronchoscopy performed at bedside on 4/11 and all cultures including fungal cultures negative. Nimbex discontinued on 4/12. She continued to require significant oxygen support thereafter, and was unable to wean oxygen requirements significantly since intubation. However, she has made slow improvements in her oxygenation since 4/18. She went into A-fib RVR in 120-160's on the evening of 4/17, and was briefly started on amiodarone gtt; this discontinued and metoprolol added with good rate control on 4/18. SAT/SBT continue daily with difficulty weaning her off fentanyl as she gets agitated tachycardic and hypertensive ('s). Valium initiated in effort to wean fentanyl off. Patient extubated on 4/22 to nasal cannula  4/24/2018 - Pt with chills/cold overnight & refusing meds, willing to take meds this morning. Hill placed this AM with good UOP. Pt still NPO per swallowing trial. Free water deficit 2.8L, starting with 500mL D5, will reassess, Pt oriented x3 this morning.    Interval History/Significant Events: See hospital course above.     Review of Systems   Constitutional: Positive for chills. Negative for fever and unexpected weight change.   HENT: Negative for hearing loss.    Eyes: Negative for visual disturbance.   Respiratory: Negative for shortness of breath and wheezing.    Cardiovascular: Positive for leg swelling. Negative for chest pain.   Gastrointestinal: Negative for abdominal pain, constipation, diarrhea, nausea and vomiting.   Genitourinary: Negative for dysuria.   Musculoskeletal: Negative for arthralgias.   Skin: Negative for rash.   Neurological: Negative for seizures.   Hematological: Negative for adenopathy. Does not bruise/bleed easily.   Psychiatric/Behavioral: Negative for agitation, behavioral problems and confusion.     Objective:     Vital Signs (Most Recent):  Temp: 97.4 °F (36.3 °C) (04/24/18 0000)  Pulse: 88 (04/24/18 0900)  Resp: (!) 28 (04/24/18 0900)  BP: (!) 146/65 (04/24/18  0900)  SpO2: 95 % (04/24/18 0906) Vital Signs (24h Range):  Temp:  [97.4 °F (36.3 °C)-98.7 °F (37.1 °C)] 97.4 °F (36.3 °C)  Pulse:  [74-88] 88  Resp:  [16-36] 28  SpO2:  [93 %-100 %] 95 %  BP: (143-178)/() 146/65   Weight: 71.5 kg (157 lb 10.1 oz)  Body mass index is 26.23 kg/m².      Intake/Output Summary (Last 24 hours) at 04/24/18 0931  Last data filed at 04/24/18 0900   Gross per 24 hour   Intake               55 ml   Output             1025 ml   Net             -970 ml       Physical Exam   Constitutional: She is oriented to person, place, and time. She appears well-developed and well-nourished.   HENT:   Head: Normocephalic and atraumatic.   Eyes: EOM are normal. Pupils are equal, round, and reactive to light.   Neck: No tracheal deviation present. No thyromegaly present.   Cardiovascular: Normal rate.    No murmur heard.  Pulmonary/Chest: Effort normal. She has no wheezes. She has no rales.   Abdominal: Bowel sounds are normal. There is no tenderness.   Musculoskeletal: She exhibits edema (BLLE 1+). She exhibits no deformity.   Neurological: She is alert and oriented to person, place, and time. She exhibits normal muscle tone.   Skin: No rash noted. No erythema.   Vitals reviewed.      Vents:  Vent Mode: A/C (04/22/18 1121)  Set Rate: 16 bmp (04/22/18 1121)  Vt Set: 600 mL (04/22/18 1121)  Pressure Support: 0 cmH20 (04/22/18 1121)  PEEP/CPAP: 5 cmH20 (04/22/18 1121)  Oxygen Concentration (%): 32 (04/23/18 0508)  Peak Airway Pressure: 20 cmH2O (04/22/18 1121)  Plateau Pressure: 19 cmH20 (04/22/18 1121)  Total Ve: 9.86 mL (04/22/18 1121)  F/VT Ratio<105 (RSBI): (!) 80 (04/22/18 0730)  Lines/Drains/Airways     Drain                 Urethral Catheter 04/24/18 0813 less than 1 day          Peripheral Intravenous Line                 Midline Catheter Insertion/Assessment  - Single Lumen 04/23/18 1300 Left brachial vein 18g x 8cm less than 1 day              Significant Labs:    CBC/Anemia Profile:    Recent  Labs  Lab 04/23/18 0235 04/24/18 0235   WBC 22.91* 23.33*   HGB 8.5* 9.4*   HCT 28.6* 31.7*    262   MCV 80* 82   RDW 18.3* 19.1*        Chemistries:    Recent Labs  Lab 04/22/18 2043 04/23/18 0235 04/24/18 0235   * 148* 152*   K 4.0 4.0 3.2*   * 113* 117*   CO2 30* 28 27   BUN 60* 59* 51*   CREATININE 0.8 0.8 0.8   CALCIUM 9.2 8.4* 8.6*   ALBUMIN  --  1.9* 2.1*   PROT  --  5.3* 5.6*   BILITOT  --  0.6 0.7   ALKPHOS  --  58 59   ALT  --  16 18   AST  --  16 16   MG  --  2.3 2.1   PHOS  --  3.4 2.4*       ABGs: No results for input(s): PH, PCO2, HCO3, POCSATURATED, BE in the last 48 hours.  CMP:   Recent Labs  Lab 04/22/18 2043 04/23/18 0235 04/24/18 0235   * 148* 152*   K 4.0 4.0 3.2*   * 113* 117*   CO2 30* 28 27   * 141* 79   BUN 60* 59* 51*   CREATININE 0.8 0.8 0.8   CALCIUM 9.2 8.4* 8.6*   PROT  --  5.3* 5.6*   ALBUMIN  --  1.9* 2.1*   BILITOT  --  0.6 0.7   ALKPHOS  --  58 59   AST  --  16 16   ALT  --  16 18   ANIONGAP 7* 7* 8   EGFRNONAA >60.0 >60.0 >60.0       Significant Imaging:  I have reviewed all pertinent imaging results/findings within the past 24 hours.    Assessment/Plan:     Neuro   Cerebrovascular accident (CVA)    --Remote lacunar infarcts in the right centrum semi-ovale, basal ganglia, and right tatyana per CTH w/o contrast on 12/2017.  --Continue home clopidogrel.  -- PT/OT        Pulmonary   * Acute hypoxemic respiratory failure    --Suspect secondary to severe bilateral pneumonia in the setting of immunosuppression; possibly  vs reaction from Humira   --Severely worsened over first week of admission despite CAP treatment.  --s/p emergent intubation on 4/11 upon MICU admission for severe hypoxemia and severe respiratory distress.  --Bronchoscopy on 4/11 with wash; culture with few WBCs, no organisms.  --Antibiotics broadened to vancomycin, zosyn, bactrim, and posaconazole initally; stopped now  --Blood, sputum, and urine cultures negative from 4/10  and 4/18  --KOH prep negative, AFB stain with no acid fast bacilli noted, fungal culture negative  --Aspergillus, Histoplasma, and Cryptococcal antigen in blood is negative. Legionella and blasomyces negative.   --Extubated on 4/22  - PO pred 60 qd for possibility of , duo nebs PRN for wheezing  - On Bactrim for PJP PPx currently  - Pt currently on 3L NC and sating well, with slightly decreased lung sounds in BLLL  - Plan to step down today        Pneumonia of both upper lobes due to infectious organism    --see above  -- CXR yesterday- no significant change        ARDS (adult respiratory distress syndrome)    --see above        Cardiac/Vascular   Hypertension, essential    - pt asymptomatic with essential HTN, on amlodipine, metoprolol          Renal/   Hypokalemia    - Asymptomatic, replacing K+, IV as pt not able to swallow the K+ pills        Hypernatremia    --trend BMP's.   --failed swallow study yesterday  - placed on D5 for 5 hr due to 2.8L free water deficit, will need additional fluid, but will re-check and assess rate        Oncology   Leukocytosis    -- Etiology unclear  --ID recommend  ---- complete 7 day zosyn course as planned today for possible HAP/aspiration   ---- stopped posaconazole (mold species coverage)   ---- all fungal markers are negative  ---  continue steroids for now (cannot definitively exclude /HP)  ---- BAL cultures negative    ---- await pending repeat cultures from 4/19 to exclude new nosocomial infection -NGTD  ----  bactrim prophylaxis reinstated per ID recs  - Pt currently with some chills, and still elevated WBC count (possibly due to steroids), no fevers         Endocrine   Thyroid enlargement    --History of Hashimoto's thyroiditis.  --Intrathoracic extension on CT.  --TSH and free T4 wnl.          GI   PUD (peptic ulcer disease)    --Continue PPI.        Orthopedic   Seropositive rheumatoid arthritis of multiple sites    --Appreciate Rheumatology  assistance.  --Continue hydroxychloroquine.  -- switched to prednisone 60mg PO QD.        Other   Long term (current) use of systemic steroids    --see above           Critical Care Daily Checklist:    A: Awake: RASS Goal/Actual Goal: RASS Goal: 0-->alert and calm  Actual: Monzon Agitation Sedation Scale (RASS): Alert and calm   B: Spontaneous Breathing Trial Performed?     C: SAT & SBT Coordinated?  NA                      D: Delirium: CAM-ICU Overall CAM-ICU: Negative   E: Early Mobility Performed? Yes   F: Feeding Goal: Goals: Meet % EEN, EPN  Status: Nutrition Goal Status: new   Current Diet Order   Procedures    Diet NPO Except for: Medication     Order Specific Question:   Except for     Answer:   Medication      AS: Analgesia/Sedation Analgesia PRN   T: Thromboembolic Prophylaxis heparin   H: HOB > 300 Yes   U: Stress Ulcer Prophylaxis (if needed) PPI   G: Glucose Control SSI   B: Bowel Function Stool Occurrence: 1   I: Indwelling Catheter (Lines & Hill) Necessity Hill good UOP   D: De-escalation of Antimicrobials/Pharmacotherapies NA, on bactrim PPx only    Plan for the day/ETD Step down to medicine, re-evaluate swallowing via speach    Code Status:  Family/Goals of Care: Full Code  Continue to treat, family updated on plan       Critical secondary to Patient has a condition that poses threat to life and bodily function: Severe Respiratory Distress      Critical care was time spent personally by me on the following activities: development of treatment plan with patient or surrogate and bedside caregivers, discussions with consultants, evaluation of patient's response to treatment, examination of patient, ordering and performing treatments and interventions, ordering and review of laboratory studies, ordering and review of radiographic studies, pulse oximetry, re-evaluation of patient's condition. This critical care time did not overlap with that of any other provider or involve time for any  procedures.     Keegan Hill MD  Critical Care Medicine  [facility]

## 2018-04-24 NOTE — ASSESSMENT & PLAN NOTE
--trend BMP's.   --failed swallow study yesterday  - placed on D5 for 5 hr due to 2.8L free water deficit, will need additional fluid, but will re-check and assess rate

## 2018-04-24 NOTE — PT/OT/SLP PROGRESS
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  She presents with the following impairments/functional limitations:  weakness, impaired endurance, impaired self care skills, impaired functional mobilty, impaired balance, impaired cognition, decreased upper extremity function, decreased lower extremity function, decreased safety awareness, pain, edema, impaired cardiopulmonary response to activity. Pt progressing towards goals, but not at PLOF. Pt with fair tolerance to session but resistive to participation at times.  Pt is improving with therapy evidenced by increased sitting tolerance. Recommend d/c to Skilled nursing facility to maximize functional independence.      Rehab Prognosis:  good; patient would benefit from acute skilled PT services to address these deficits and reach maximum level of function.      Recent Surgery: * No surgery found *      Plan:     During this hospitalization, patient to be seen 5 x/week to address the above listed problems via gait training, therapeutic activities, therapeutic exercises, neuromuscular re-education  · Plan of Care Expires:  05/12/18   Plan of Care Reviewed with: patient, daughter    Subjective     Communicated with RN prior to session.  Patient found in bed  upon PT entry to room, agreeable to treatment.      Chief Complaint: pain/not wanting to participate in therapy   Patient comments/goals: to get better   Pain/Comfort:  · Pain Rating 1:  (R knee; unrated)  · Pain Addressed 1: Distraction, Reposition, Nurse notified    Patients cultural, spiritual, [employer] conflicts given the  current situation: none reported     Objective:     Patient found with: telemetry, pulse ox (continuous), blood pressure cuff, oxygen, peripheral IV     General Precautions: Standard, fall   Orthopedic Precautions:N/A   Braces: N/A     Functional Mobility:  · Bed Mobility:     · Rolling Left:  total assistance  · Rolling Right: total assistance  · Scooting: total assistance  · Supine to Sit: total assistance  · Sit to Supine: total assistance  · Transfers: not performed   · Gait: not performed       AM-PAC 6 CLICK MOBILITY  Turning over in bed (including adjusting bedclothes, sheets and blankets)?: 2  Sitting down on and standing up from a chair with arms (e.g., wheelchair, bedside commode, etc.): 1  Moving from lying on back to sitting on the side of the bed?: 2  Moving to and from a bed to a chair (including a wheelchair)?: 1  Need to walk in hospital room?: 1  Climbing 3-5 steps with a railing?: 1  Total Score: 8       Therapeutic Activities and Exercises:  Educated pt on PT POC  Educated pt on importance of participation in therapy for recovery  Pt would benefit from further education.     Sitting EOB PROM/AAROM  LAQ's x 5 reps B LE    Supine PROM/AAROM  Ankle pumps x 10 reps  Hip abduction/adduction x 10 reps     Sitting EOB x 8 minutes with total A to increase tolerance to OOB activity and to create optimal positioning for lung expansion   · Pt with severe posterior lean at times      Patient left HOB elevated with all lines intact, call button in reach, RN notified and daughter present..    GOALS:    Physical Therapy Goals        Problem: Physical Therapy Goal    Goal Priority Disciplines Outcome Goal Variances Interventions   Physical Therapy Goal     PT/OT, PT Ongoing (interventions implemented as appropriate)     Description:  Goals to be met by: 18     Patient will increase functional independence with mobility by performin. Supine to sit with Moderate Assistance - not met  2. Sit to supine  with Moderate Assistance - not met  3. Sit to stand transfer with Moderate Assistance - not met  4. Bed to chair transfer with Moderate Assistance using LRAD  5. Gait  x 20 feet with Moderate Assistance using LRAD    6. Ascend/descend 5 stair with bilateral Handrails Moderate Assistance using Single-point Cane .   7. Lower extremity exercise program x20 reps per handout, with supervision                       Time Tracking:     PT Received On: 04/24/18  PT Start Time: 0820     PT Stop Time: 0851   PT Total Time (min): 31 min     Billable Minutes: Therapeutic Activity 15 and Therapeutic Exercise 15    Treatment Type: Treatment  PT/PTA: PT     PTA Visit Number: 1     Xiao Preciado PT, DPT  4/24/2018  [phone]

## 2018-04-24 NOTE — ASSESSMENT & PLAN NOTE
--Suspect secondary to severe bilateral pneumonia in the setting of immunosuppression; possibly  vs reaction from Humira   --Severely worsened over first week of admission despite CAP treatment.  --s/p emergent intubation on 4/11 upon MICU admission for severe hypoxemia and severe respiratory distress.  --Bronchoscopy on 4/11 with wash; culture with few WBCs, no organisms.  --Antibiotics broadened to vancomycin, zosyn, bactrim, and posaconazole initally; stopped now  --Blood, sputum, and urine cultures negative from 4/10 and 4/18  --KOH prep negative, AFB stain with no acid fast bacilli noted, fungal culture negative  --Aspergillus, Histoplasma, and Cryptococcal antigen in blood is negative. Legionella and blasomyces negative.   --Extubated on 4/22  - PO pred 60 qd for possibility of , duo nebs PRN for wheezing  - On Bactrim for PJP PPx currently  - Pt currently on 3L NC and sating well, with slightly decreased lung sounds in BLLL  - Plan to step down today

## 2018-04-24 NOTE — PLAN OF CARE
Problem: Occupational Therapy Goal  Goal: Occupational Therapy Goal  Goals to be met by: 5/5/18    Patient will increase functional independence with ADLs by performing:  Pt to follow all simple commands to allow for improved participation and engagement with ADL skills.--MET  Pt to perform basic g/h skills in sitting with MIN A  Pt/fly to be independent with ROM/positioning skills.  Pt to tolerate sitting EOB with Fair balance in prep for furher ADL and transfer training.           Goals remain appropriate.

## 2018-04-24 NOTE — PLAN OF CARE
Problem: Patient Care Overview  Goal: Plan of Care Review  Outcome: Ongoing (interventions implemented as appropriate)  VSS within the shift. Patient is conscious and coherent. Afebrile. With no pain concerns. On sinus rhythm. No hypotesion noted. O2 support provided with 3Lpm via nasal cannula. No desaturations observed. Kept on NPO. Awaiting to pass swallow screen. For transfer to stepdown unit. Awaiting for room availability. POC discussed with patient and daughter. Will continue to monitor.

## 2018-04-24 NOTE — PT/OT/SLP PROGRESS
"Speech Language Pathology Treatment    Patient Name:  Selma Alonzo Lux MD   MRN:  [id number]  Admitting Diagnosis: Acute hypoxemic respiratory failure  The primary encounter diagnosis was Pneumonia of both lungs due to infectious organism, unspecified part of lung. Diagnoses of Shortness of breath, Dyspnea, Cough, Long term (current) use of systemic steroids, Hypertension, essential, CHF, acute, Acute hypoxemic respiratory failure, Septic shock, Acute respiratory failure with hypoxia, KERMIT (acute kidney injury), ARDS (adult respiratory distress syndrome), Hyperkalemia, Hyperglycemia, and Leukocytosis, unspecified type were also pertinent to this visit. PMH CVA also pertinent to this visit.     Recommendations:                 General Recommendations:  Dysphagia therapy and Modified barium swallow study  Diet recommendations:  NPO (MBSS ), Liquid Diet Level: NPO   Aspiration Precautions: Alternate means of nutrition/hydration, Frequent oral care, Ice chips sparingly, Meds crushed in puree and Strict aspiration precautions   General Precautions: Standard, aspiration, fall  Communication strategies:  provide increased time to answer and go to room if call light pushed    Subjective     SLP reviewed Pt with nurse, nurse confirms Patient with minimal sleep night prior and increased emotional lability  Pt presents lethargic  Pt says, "Just going to do what I have to do"  Pt daughter says, "She is tired because she has not eaten anything"  Patient goals: to return home    Pain/Comfort:  · Pain Rating 1: 0/10    Objective:     Has the patient been evaluated by SLP for swallowing?   Yes  Keep patient NPO? Yes   Current Respiratory Status: nasal cannula    Prior Intubation HX:  4/11/2018 - 4/22/2018  CXR:  4/23/1: Right-sided central venous catheter in the SVC.  Heart is not enlarged.  Ill-defined patchy airspace disease identified and the lung bases more on the right side and the subsegmental atelectatic changes also " present.  No significant pleural effusion    Pt seen for ongoing swallow assessment.  Pt presents fatigued.  Daughter at bedside, expressing eagerness for Patient to have breakfast. Patient with minimal eye contact, requires cues to open eyes when talking with ST. Pt with mild-moderately strained vocal quality this morning. Pt declining oral care. Pt seen with trials of thin liquids (ice chips x2, cup edge sips x2), nectar-thickened liquids (tsp sipx1, cup edge sips x3) and puree (1/2 tsp bites x2, full teaspoon bite x1.) Pt with decreased labial seal around spoon edge and cup edge across trials, occasionally appearing to chew on pureed and nectar-thickened contents. Pt with inconsistent timing of initiation of swallow upon palpation of the larynx. Patient with throat clearing following trials of nectar-thickened liquidsx1 and full teaspoon puree x1. Pt coughing following cup edge sip thin liquids. SLP educates Patient and Daughter on S/S aspiration and need for further, objective assessment via MBSS.  Pt and daughter verbalize understanding of recommendations. Whiteboard current. No further questions. Nurse and MD team notified of findings/recs.  MD team confirms ordes placed for MBSS. SLP reviews order with Radiology and Radiology confirms test tentatively scheduled for 4/25/18.     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  She would benefit from further assessment via MBSS to determine safety/feasibility of re-initiation of PO diet.     Goals:    SLP Goals        Problem: SLP Goal    Goal Priority Disciplines Outcome   SLP Goal     SLP Ongoing (interventions implemented as appropriate)   Description:  Speech Language Pathology Goals  Goals expected to be met by 4/30  1. Patient will participate in ongoing swallow assessment                       Plan:     · Patient to be seen:  5 x/week   · Plan of Care expires:  05/23/18  · Plan of Care reviewed with:   patient, daughter   · SLP Follow-Up:  Yes       Discharge recommendations:  nursing facility, skilled   Barriers to Discharge:  Level of Skilled Assistance Needed     Time Tracking:     SLP Treatment Date:   04/24/18  Speech Start Time:  1050  Speech Stop Time:  1120     Speech Total Time (min):  30 min    Billable Minutes: Treatment Swallowing Dysfunction 30    JESSICA Ray, [facility]-SLP  Speech-Language Pathology  Pager: [phone]      04/24/2018

## 2018-04-24 NOTE — PROGRESS NOTES
[facility]  Infectious Disease  Progress Note    Patient Name: Selma Rosado MD Jose J  MRN: [id number]  Admission Date: 4/5/2018  Length of Stay: 18 days  Attending Physician: [place] MD  Primary Care Provider: Bhargav Hirsch MD    Isolation Status: No active isolations  Assessment/Plan:      * Acute hypoxemic respiratory failure    83yo woman w/a history of prior CVA, RA (dx 2012; RF seropositive, erosive; on HCQ/prednisone 5mg and recently started humira first dose 3/22/2018), IBS, and Hasimotos thyroiditis (goiter) who was admitted on 4/5/2018 with 2 days of dry cough and progressive SOB due to multifocal PNA (with peripheral and basilar lesions noted on CT chest) that progressively worsened despite CAP coverage x5 days, requiring intubation on 4/12 for hypoxic RF. Seemed to slowly improve with expanded coverage vanco/pip/tazo and steroids also started on posaconazole Course c/b KERMIT (now resolved after empiric bactrim stopped with negative GMS) and AF w/RVR (converted with amiodarone). Etiology remains unknown despite her slow improvement and now extubation    - would stop posaconazole,all fungal markers negative and BAL cultures negative for pathogens (c albicans growing likely contamination/colonization)  -would recommend switching back to tmp/smx for PJP ppx as renal fxn improves  - will sign off for now please call back if questions  - discussed with team            Anticipated Disposition: pending    Thank you for your consult. I will sign off. Please contact us if you have any additional questions.    Shawn Giles MD  Infectious Disease  [facility]    Subjective:     Principal Problem:Acute hypoxemic respiratory failure    HPI: No notes on file  Interval History:  Exutabed, still with cough    Review of Systems   Constitutional: Negative for activity change, chills and fever.   HENT: Negative for congestion, mouth sores, rhinorrhea, sinus pressure  and sore throat.    Eyes: Negative for photophobia, pain and redness.   Respiratory: Positive for cough. Negative for chest tightness, shortness of breath and wheezing.    Cardiovascular: Negative for chest pain and leg swelling.   Gastrointestinal: Negative for abdominal distention, abdominal pain, diarrhea, nausea and vomiting.   Endocrine: Negative for polyuria.   Genitourinary: Negative for decreased urine volume, dysuria and flank pain.   Musculoskeletal: Negative for joint swelling and neck pain.   Skin: Negative for color change.   Allergic/Immunologic: Negative for food allergies.   Neurological: Negative for dizziness, weakness and headaches.   Hematological: Negative for adenopathy.   Psychiatric/Behavioral: Negative for agitation and confusion. The patient is not nervous/anxious.      Objective:     Vital Signs (Most Recent):  Temp: 99.2 °F (37.3 °C) (04/20/18 0700)  Pulse: 110 (04/20/18 0800)  Resp: (!) 35 (04/20/18 0800)  BP: 136/60 (04/20/18 0800)  SpO2: (!) 93 % (04/20/18 0800) Vital Signs (24h Range):  Temp:  [98.1 °F (36.7 °C)-99.2 °F (37.3 °C)] 99.2 °F (37.3 °C)  Pulse:  [] 110  Resp:  [10-47] 35  SpO2:  [91 %-98 %] 93 %  BP: (128-166)/(60-74) 136/60  Arterial Line BP: (122-183)/(48-75) 124/52     Weight: 71.5 kg (157 lb 10.1 oz)  Body mass index is 26.23 kg/m².    Estimated Creatinine Clearance: 40.4 mL/min (based on SCr of 1.1 mg/dL).    Physical Exam   Constitutional: She is oriented to person, place, and time. She appears well-developed and well-nourished. No distress.   Intubated/sedated.   HENT:   Head: Normocephalic and atraumatic.   Mouth/Throat: Oropharynx is clear and moist. No oropharyngeal exudate.   Eyes: Conjunctivae and EOM are normal. Pupils are equal, round, and reactive to light. No scleral icterus.   Neck: Normal range of motion. Neck supple.   Cardiovascular: Normal rate and regular rhythm.  Exam reveals no friction rub.    No murmur heard.  Pulmonary/Chest: Effort normal.  No respiratory distress. She has no wheezes. She has rales. She exhibits no tenderness.   Abdominal: Soft. Bowel sounds are normal. She exhibits no distension. There is no tenderness. There is no rebound and no guarding.   Musculoskeletal: Normal range of motion. She exhibits no edema or tenderness.   Lymphadenopathy:     She has no cervical adenopathy.   Neurological: She is alert and oriented to person, place, and time. Coordination normal.   .   Skin: Skin is warm and dry. No rash noted. No erythema.   Psychiatric: She has a normal mood and affect. Her behavior is normal.       Significant Labs:   CBC:     Recent Labs  Lab 04/19/18  0244 04/20/18  0253   WBC 21.52* 29.61*   HGB 9.1* 10.0*   HCT 29.5* 34.3*   * 464*     CMP:     Recent Labs  Lab 04/19/18  0244 04/19/18  0245 04/19/18  2101 04/20/18  0253 04/20/18  0757   NA  --  152* 154*  --  157*   K  --  4.3 3.5  --  4.0   CL  --  111* 113*  --  115*   CO2  --  32* 33*  --  33*   GLU  --  170* 133*  --  171*   BUN  --  68* 60*  --  61*   CREATININE  --  1.2 1.2  --  1.1   CALCIUM  --  8.4* 8.5*  --  8.8   PROT 6.4 6.5  --  6.6  --    ALBUMIN 1.8* 1.8*  --  2.0*  --    BILITOT 0.5 0.5  --  0.7  --    ALKPHOS 66 63  --  81  --    AST 17 17  --  11  --    ALT 14 13  --  12  --    ANIONGAP  --  9 8  --  9   EGFRNONAA  --  42.8* 42.8*  --  47.5*       Significant Imaging: I have reviewed all pertinent imaging results/findings within the past 24 hours.     CT chest:  1. Progression of bilateral patchy and confluent pulmonary consolidation compatible with pneumonia less likely edema or noninfectious inflammation.  There is air noted throughout the esophagus.  2. Stable markedly enlarged right thyroid lobe with large hypodense nodule measuring 3.2 x 2.4 x 4.7 cm.  3. Right renal cyst.  4. Enlarged lobular uterus with calcifications compatible with fibroids.  5. Additional findings as above.     Microbiology:  4/5 blood cx negative  4/5 urine cx negative  4/10  C.diff negative  4/11 blood cx negative  4/11 BAL (, 51% seg, 35% L, 4% eos cx with few yeast, cytology with acute inflammation; GMS negative, ASp ag neg  4/11 aspergillus antigen negative  4/11 fungitell negative  4/11 urine histo negative  4/11 urine blasto negative  4/11 serum crypto negative  4/11 RVP pending  4/11 urine legionella negative

## 2018-04-24 NOTE — PROGRESS NOTES
Patient remains uncooperative. NP was made aware. Low potassium and Phosphorus results were also relayed. No replacement orders given since patient is refusing everything.

## 2018-04-24 NOTE — RESIDENT HANDOFF
Handoff     Primary Team: [facility] CRITICAL CARE MEDICINE Room Number: 3081/3081 A     Patient Name: Selma Alonzo Lux MD MRN: [id number]     Date of Birth: [id number] Allergies: Patient has no known allergies.     Age: 82 y.o. Admit Date: 4/5/2018     Sex: female  BMI: Body mass index is 26.23 kg/m².     Code Status: Full Code        Illness Level (current clinical status): Watcher - Yes - Ensure respiratory status continues to improve. Pt with altered mental status previously.    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 She was seen by her PCP on 4/2 for these complaints and received a prescription for duo nebs and tessalon perles. CXR performed on 4/2 noted bilateral basilar infiltrates. At that time, she denied fever, chills, sputum production, sick contacts. She was admitted to  and treated emprically for CAP with rocephin and azithromycin. She was also diuresed daily. However, her oxygen requirements slowly increased from 2 L NC to 4 L NC with also progressively worsening bilateral infiltrates on imaging. CTA performed on 4/9 negative for PE, however noted significant bilateral consolidations. On 4/11, her hypoxemia continued to worsen, requiring NRB mask, and she developed severe respiratory distress. She was transferred to the MICU and emergently intubated. Repeat CT chest performed on 4/11 demonstrated progressive worsening of bilateral peripheral infiltrates.    Procedure Date: Intubation 4/11, extubation 4/22    Hospital Course (updated, brief assessment by system or problem, significant events): Admitted to MICU on 4/11,  intubated emergently for severe hypoxemia and respiratory distress. ID was consulted for assistance in management given immunocompromised state. Patient paralyzed with nimbex following persistent dysynchrony with vent despite sedation. Abx broadened to vancomycin, zosyn, bactrim, and posaconazole. Norepinephrine initiated for BP support. Bronchoscopy performed at bedside on 4/11 and all cultures including fungal cultures negative. Nimbex discontinued on 4/12. She continued to require significant oxygen support thereafter, and was unable to wean oxygen requirements significantly since intubation. However, she has made slow improvements in her oxygenation since 4/18. She went into A-fib RVR in 120-160's on the evening of 4/17, and was briefly started on amiodarone gtt; this discontinued and metoprolol added with good rate control on 4/18. SAT/SBT continue daily with difficulty weaning her off fentanyl as she gets agitated tachycardic and hypertensive ('s). Valium initiated in effort to wean fentanyl off. Patient extubated on 4/22 to nasal cannula  4/24/2018 - Pt with chills/cold overnight & refusing meds, willing to take meds this morning. Hill placed this AM with good UOP. Pt still NPO per swallowing trial. Free water deficit 2.8L, starting with 500mL D5, will reassess, Pt oriented x3 this morning. Re-evaluate swallowing.    Tasks (specific, using if-then statements):   Swallowing study- advance diet as tolerated  Changed prednisone to PO  Restarted Bactrim for PJP prophylaxis  Will need pulmonary FU upon discharge for steroid taper and follow up PNA  Tele monitoring- currently NSR  Replacing K+ with IV K+  Giving D5 to correct Hypernatremia as pt has free water deficit of 2.8L, will need reassessment  PT/OT for hospitalized decompensation in elderly- look to get into rehab facility upon discharge      Contingency Plan (special circumstances anticipated and plan): If patient spikes fever or has respiratory  distress consider reintroducing antibiotics

## 2018-04-24 NOTE — PT/OT/SLP PROGRESS
Occupational Therapy   Treatment    Name: Selma Lux MD  MRN: [id number]  Admitting Diagnosis:  Acute hypoxemic respiratory failure       Recommendations:     Discharge Recommendations: nursing facility, skilled      Subjective     Communicated with: nsg prior to session.  Pain/Comfort:  · Pain Rating 1:  (pt initially reports no pain, but pt then stated pain in left knee with ROM.)    Patients cultural, spiritual, Yazidism conflicts given the current situation: none stated    Objective:     Patient found supine in bed with daughter present. :      General Precautions: Standard, fall   Orthopedic Precautions:N/A     Occupational Performance:    Bed Mobility:    · Patient completed Supine to Sit with total assistance and 2 persons  · Patient completed Sit to Supine with total assistance and 2 persons   · Pt completed rolling right<>left with TOTAL A       Activities of Daily Living:  · Pt requiring TOTAL A for all ADL's at this time.     Patient left left sidelying with all lines intact, call button in reach, nsg notified and daughter  present    [facility] 6 Click:  [facility] Total Score: 6    Treatment & Education:  -Pt required cues and encouragement to participation despite fact that nsg had paged therapy to come see pt this AM per pt request. Pt eventually sat EOB with poor balance. Pt occasionally demo active core activation to attempt upright sitting, but pt also throwing self back at times making hips slide too close to EOB. Desptie cues and repositioning of pt correctly on EOB, pt with decreased safety and pt assisted back to supine.  AA/PROM B UE provided all available joints with education provided to pt and nsg re: technique and importance of ROM exs. OT positioned UE to assist with edema mangt. (Moderate edema noted right distal UE).  -OT provided pt with modified hand squeeze defice to promote active composite finger flexion with minimal resistance. Pt would not try to complete the task, but OT  educated pt/fly re: purpose of the device.   -Pt able to follow commands. Pt often closing eyes during session and appeared to purposefully not engage with therapist. Nsg and daughter report pt with poor night's sleep.   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   Performance deficits affecting function are weakness, impaired functional mobilty, gait instability, impaired self care skills, impaired endurance, impaired balance, decreased upper extremity function, decreased lower extremity function.  Pt tolerated session fair. VSS remained stable throughout session. Pt is not safe to return home at this time and would benefit from further therapy services to increase functional independence.   POC and goals updated this date since pt is off ventilator and able to more actively participate with therapy.   Rehab Prognosis:  Good ; patient would benefit from acute skilled OT services to address these deficits and reach maximum level of function.       Plan:     Patient to be seen 4 x/week to address the above listed problems via self-care/home management, therapeutic activities, therapeutic exercises  · Plan of Care Expires: 05/06/18  · Plan of Care Reviewed with: patient, daughter    This Plan of care has been discussed with the patient who was involved in its development and understands and is in agreement with the identified goals and treatment plan    GOALS:    Occupational Therapy Goals        Problem: Occupational Therapy Goal    Goal Priority Disciplines Outcome Interventions   Occupational Therapy Goal     OT, PT/OT Ongoing (interventions implemented as appropriate)    Description:  Goals to be met by: 5/5/18    Patient will increase functional independence with ADLs by performing:  Pt to follow all simple commands to allow for improved participation and engagement with ADL skills.--MET  Pt to perform basic g/h  skills in sitting with MIN A  Pt/fly to be independent with ROM/positioning skills.  Pt to tolerate sitting EOB with Fair balance in prep for furher ADL and transfer training.                             Time Tracking:     OT Date of Treatment: 04/24/18  OT Start Time: 0900  OT Stop Time: 0925  OT Total Time (min): 25 min    Billable Minutes:Therapeutic Activity 10  Therapeutic Exercise 13    AYAD Hernandez  4/24/2018

## 2018-04-25 PROBLEM — K58.9 IBS (IRRITABLE BOWEL SYNDROME): Status: RESOLVED | Noted: 2017-06-21 | Resolved: 2018-04-25

## 2018-04-25 PROBLEM — G25.5 HEMICHOREA: Status: RESOLVED | Noted: 2017-08-23 | Resolved: 2018-04-25

## 2018-04-25 PROBLEM — G62.9 NEUROPATHY: Status: RESOLVED | Noted: 2017-08-23 | Resolved: 2018-04-25

## 2018-04-25 PROBLEM — M54.50 CHRONIC BILATERAL LOW BACK PAIN WITHOUT SCIATICA: Status: RESOLVED | Noted: 2017-03-23 | Resolved: 2018-04-25

## 2018-04-25 PROBLEM — R47.1 DYSARTHRIA: Status: RESOLVED | Noted: 2017-12-19 | Resolved: 2018-04-25

## 2018-04-25 PROBLEM — Z86.73 HISTORY OF STROKE: Status: ACTIVE | Noted: 2017-12-12

## 2018-04-25 PROBLEM — G89.29 CHRONIC BILATERAL LOW BACK PAIN WITHOUT SCIATICA: Status: RESOLVED | Noted: 2017-03-23 | Resolved: 2018-04-25

## 2018-04-25 PROBLEM — R41.841 COGNITIVE COMMUNICATION DEFICIT: Status: RESOLVED | Noted: 2017-12-19 | Resolved: 2018-04-25

## 2018-04-25 PROBLEM — M79.642 BILATERAL HAND PAIN: Status: RESOLVED | Noted: 2018-03-14 | Resolved: 2018-04-25

## 2018-04-25 PROBLEM — M79.641 BILATERAL HAND PAIN: Status: RESOLVED | Noted: 2018-03-14 | Resolved: 2018-04-25

## 2018-04-25 LAB
ALBUMIN SERPL BCP-MCNC: 1.9 G/DL
ALP SERPL-CCNC: 58 U/L
ALT SERPL W/O P-5'-P-CCNC: 20 U/L
ANION GAP SERPL CALC-SCNC: 5 MMOL/L
ANION GAP SERPL CALC-SCNC: 5 MMOL/L
ANION GAP SERPL CALC-SCNC: 6 MMOL/L
AST SERPL-CCNC: 17 U/L
BASOPHILS # BLD AUTO: 0.01 K/UL
BASOPHILS NFR BLD: 0.1 %
BILIRUB DIRECT SERPL-MCNC: 0.4 MG/DL
BILIRUB SERPL-MCNC: 0.6 MG/DL
BUN SERPL-MCNC: 39 MG/DL
CALCIUM SERPL-MCNC: 8.3 MG/DL
CALCIUM SERPL-MCNC: 8.4 MG/DL
CALCIUM SERPL-MCNC: 8.4 MG/DL
CHLORIDE SERPL-SCNC: 116 MMOL/L
CHLORIDE SERPL-SCNC: 118 MMOL/L
CHLORIDE SERPL-SCNC: 119 MMOL/L
CO2 SERPL-SCNC: 25 MMOL/L
CO2 SERPL-SCNC: 26 MMOL/L
CO2 SERPL-SCNC: 27 MMOL/L
CREAT SERPL-MCNC: 0.7 MG/DL
CREAT SERPL-MCNC: 0.8 MG/DL
CREAT SERPL-MCNC: 0.9 MG/DL
DIFFERENTIAL METHOD: ABNORMAL
EOSINOPHIL # BLD AUTO: 0.3 K/UL
EOSINOPHIL NFR BLD: 1.5 %
ERYTHROCYTE [DISTWIDTH] IN BLOOD BY AUTOMATED COUNT: 19.4 %
EST. GFR  (AFRICAN AMERICAN): >60 ML/MIN/1.73 M^2
EST. GFR  (NON AFRICAN AMERICAN): >60 ML/MIN/1.73 M^2
GLUCOSE SERPL-MCNC: 101 MG/DL
GLUCOSE SERPL-MCNC: 147 MG/DL
GLUCOSE SERPL-MCNC: 174 MG/DL
HCT VFR BLD AUTO: 29.6 %
HGB BLD-MCNC: 8.7 G/DL
IMM GRANULOCYTES # BLD AUTO: 0.12 K/UL
IMM GRANULOCYTES NFR BLD AUTO: 0.6 %
INR PPP: 1.2
LYMPHOCYTES # BLD AUTO: 0.9 K/UL
LYMPHOCYTES NFR BLD: 5 %
MAGNESIUM SERPL-MCNC: 2 MG/DL
MCH RBC QN AUTO: 24.5 PG
MCHC RBC AUTO-ENTMCNC: 29.4 G/DL
MCV RBC AUTO: 83 FL
MONOCYTES # BLD AUTO: 2 K/UL
MONOCYTES NFR BLD: 10.4 %
NEUTROPHILS # BLD AUTO: 15.5 K/UL
NEUTROPHILS NFR BLD: 82.4 %
NRBC BLD-RTO: 0 /100 WBC
PHOSPHATE SERPL-MCNC: 2.8 MG/DL
PLATELET # BLD AUTO: 206 K/UL
PMV BLD AUTO: 11.3 FL
POCT GLUCOSE: 130 MG/DL (ref 70–110)
POCT GLUCOSE: 174 MG/DL (ref 70–110)
POCT GLUCOSE: 186 MG/DL (ref 70–110)
POCT GLUCOSE: 82 MG/DL (ref 70–110)
POCT GLUCOSE: 94 MG/DL (ref 70–110)
POCT GLUCOSE: 97 MG/DL (ref 70–110)
POTASSIUM SERPL-SCNC: 3.5 MMOL/L
POTASSIUM SERPL-SCNC: 3.9 MMOL/L
POTASSIUM SERPL-SCNC: 4.4 MMOL/L
PROT SERPL-MCNC: 5 G/DL
PROTHROMBIN TIME: 12.6 SEC
RBC # BLD AUTO: 3.55 M/UL
SODIUM SERPL-SCNC: 147 MMOL/L
SODIUM SERPL-SCNC: 150 MMOL/L
SODIUM SERPL-SCNC: 150 MMOL/L
WBC # BLD AUTO: 18.79 K/UL

## 2018-04-25 PROCEDURE — 36415 COLL VENOUS BLD VENIPUNCTURE: CPT

## 2018-04-25 PROCEDURE — 84100 ASSAY OF PHOSPHORUS: CPT

## 2018-04-25 PROCEDURE — 25000003 PHARM REV CODE 250: Performed by: CLINICAL NURSE SPECIALIST

## 2018-04-25 PROCEDURE — 63600175 PHARM REV CODE 636 W HCPCS: Performed by: HOSPITALIST

## 2018-04-25 PROCEDURE — 25000003 PHARM REV CODE 250: Performed by: HOSPITALIST

## 2018-04-25 PROCEDURE — 92611 MOTION FLUOROSCOPY/SWALLOW: CPT

## 2018-04-25 PROCEDURE — 85025 COMPLETE CBC W/AUTO DIFF WBC: CPT

## 2018-04-25 PROCEDURE — 85610 PROTHROMBIN TIME: CPT

## 2018-04-25 PROCEDURE — 83735 ASSAY OF MAGNESIUM: CPT

## 2018-04-25 PROCEDURE — 80048 BASIC METABOLIC PNL TOTAL CA: CPT | Mod: 91

## 2018-04-25 PROCEDURE — 63600175 PHARM REV CODE 636 W HCPCS: Performed by: NURSE PRACTITIONER

## 2018-04-25 PROCEDURE — 20600001 HC STEP DOWN PRIVATE ROOM

## 2018-04-25 PROCEDURE — 25000003 PHARM REV CODE 250: Performed by: NURSE PRACTITIONER

## 2018-04-25 PROCEDURE — 25000003 PHARM REV CODE 250: Performed by: STUDENT IN AN ORGANIZED HEALTH CARE EDUCATION/TRAINING PROGRAM

## 2018-04-25 PROCEDURE — 80076 HEPATIC FUNCTION PANEL: CPT

## 2018-04-25 PROCEDURE — 99233 SBSQ HOSP IP/OBS HIGH 50: CPT | Mod: GC,,, | Performed by: INTERNAL MEDICINE

## 2018-04-25 RX ORDER — DEXTROSE MONOHYDRATE 50 MG/ML
INJECTION, SOLUTION INTRAVENOUS CONTINUOUS
Status: DISCONTINUED | OUTPATIENT
Start: 2018-04-25 | End: 2018-04-27

## 2018-04-25 RX ORDER — ENOXAPARIN SODIUM 100 MG/ML
40 INJECTION SUBCUTANEOUS EVERY 24 HOURS
Status: DISCONTINUED | OUTPATIENT
Start: 2018-04-25 | End: 2018-04-29

## 2018-04-25 RX ADMIN — Medication 12.5 MG: at 10:04

## 2018-04-25 RX ADMIN — AMLODIPINE BESYLATE 5 MG: 5 TABLET ORAL at 09:04

## 2018-04-25 RX ADMIN — HEPARIN SODIUM 5000 UNITS: 5000 INJECTION, SOLUTION INTRAVENOUS; SUBCUTANEOUS at 05:04

## 2018-04-25 RX ADMIN — Medication 12.5 MG: at 08:04

## 2018-04-25 RX ADMIN — ENOXAPARIN SODIUM 40 MG: 100 INJECTION SUBCUTANEOUS at 09:04

## 2018-04-25 RX ADMIN — CLOPIDOGREL 75 MG: 75 TABLET, FILM COATED ORAL at 08:04

## 2018-04-25 RX ADMIN — DEXTROSE: 5 SOLUTION INTRAVENOUS at 01:04

## 2018-04-25 RX ADMIN — INSULIN ASPART 2 UNITS: 100 INJECTION, SOLUTION INTRAVENOUS; SUBCUTANEOUS at 08:04

## 2018-04-25 RX ADMIN — PANTOPRAZOLE SODIUM 40 MG: 40 GRANULE, DELAYED RELEASE ORAL at 09:04

## 2018-04-25 RX ADMIN — PREDNISONE 60 MG: 20 TABLET ORAL at 09:04

## 2018-04-25 RX ADMIN — SULFAMETHOXAZOLE AND TRIMETHOPRIM 1 TABLET: 800; 160 TABLET ORAL at 09:04

## 2018-04-25 RX ADMIN — HYDROXYCHLOROQUINE SULFATE 400 MG: 200 TABLET, FILM COATED ORAL at 09:04

## 2018-04-25 NOTE — PLAN OF CARE
Referral sent to     1. [facility]  2. [facility]

## 2018-04-25 NOTE — SUBJECTIVE & OBJECTIVE
Interval History: Stepped down to hospital medicine, no acute events overnight. Patient to undergo swallow study today.     Review of Systems   Constitutional: Negative for chills, fever and unexpected weight change.   HENT: Negative for hearing loss.    Eyes: Negative for visual disturbance.   Respiratory: Negative for shortness of breath and wheezing.    Cardiovascular: Positive for leg swelling. Negative for chest pain.   Gastrointestinal: Negative for abdominal pain, constipation, diarrhea, nausea and vomiting.   Genitourinary: Negative for dysuria.   Musculoskeletal: Negative for arthralgias.   Skin: Negative for rash.   Neurological: Negative for seizures.   Hematological: Negative for adenopathy. Does not bruise/bleed easily.   Psychiatric/Behavioral: Negative for agitation, behavioral problems and confusion.     Objective:     Vital Signs (Most Recent):  Temp: 98.3 °F (36.8 °C) (04/25/18 0839)  Pulse: 77 (04/25/18 0839)  Resp: 18 (04/25/18 0839)  BP: (!) 142/65 (04/25/18 0839)  SpO2: 96 % (04/25/18 0839) Vital Signs (24h Range):  Temp:  [97.6 °F (36.4 °C)-99.3 °F (37.4 °C)] 98.3 °F (36.8 °C)  Pulse:  [70-93] 77  Resp:  [18-34] 18  SpO2:  [94 %-100 %] 96 %  BP: (137-147)/(61-71) 142/65     Weight: 71.5 kg (157 lb 10.1 oz)  Body mass index is 26.23 kg/m².    Intake/Output Summary (Last 24 hours) at 04/25/18 0924  Last data filed at 04/25/18 0590   Gross per 24 hour   Intake              790 ml   Output             1225 ml   Net             -435 ml      Physical Exam   Constitutional: She is oriented to person, place, and time. She appears well-developed and well-nourished.   HENT:   Head: Normocephalic and atraumatic.   Eyes: EOM are normal. Pupils are equal, round, and reactive to light.   Neck: No tracheal deviation present. No thyromegaly present.   Cardiovascular: Normal rate.    No murmur heard.  Pulmonary/Chest: Effort normal. She has no wheezes. She has no rales.   Abdominal: Bowel sounds are normal.  There is no tenderness.   Musculoskeletal: She exhibits edema (BLLE 1+). She exhibits no deformity.   Neurological: She is alert and oriented to person, place, and time. She exhibits normal muscle tone.   Skin: No rash noted. No erythema.   Vitals reviewed.      Significant Labs:   BMP:   Recent Labs  Lab 04/25/18  0832      *   K 3.5   *   CO2 27   BUN 39*   CREATININE 0.8   CALCIUM 8.4*   MG 2.0     CBC:   Recent Labs  Lab 04/24/18  0235 04/25/18  0832   WBC 23.33* 18.79*   HGB 9.4* 8.7*   HCT 31.7* 29.6*    206       Significant Imaging: I have reviewed all pertinent imaging results/findings within the past 24 hours.

## 2018-04-25 NOTE — PROCEDURES
Modified Barium Swallow    Patient Name:  Selma Alonzo Lux MD   MRN:  [id number]   1012/1012 A      Recommendations:     Recommendations:                General Recommendations:  Dysphagia therapy  Diet recommendations:  NPO, NPO   Aspiration Precautions: Alternate means of nutrition/hydration, Frequent oral care and Strict aspiration precautions   General Precautions: Standard, fall, aspiration, NPO  Communication strategies:  none    Referral     Reason for Referral  Patient was referred for a Modified Barium Swallow Study to assess the efficiency of his/her swallow function, rule out aspiration and make recommendations regarding safe dietary consistencies, effective compensatory strategies, and safe eating environment.     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    Hypertension     IBS (irritable bowel syndrome) 6/21/2017    IGT (impaired glucose tolerance) 1/12/2016    Iron deficiency anemia secondary to inadequate dietary iron intake 6/24/2013    Joint pain     Keratoconjunctivitis sicca of both eyes not specified as Sjogren's 7/29/2016    Leiomyoma of uterus, unspecified 9/16/2014    Long QT interval 6/29/2016    Due to medication (plaquenil)     Macular edema 1/12/2015    Multinodular goiter 1/12/2016    Neuropathy 8/23/2017    Plaquenil causing adverse effect in therapeutic use 10/7/2016    Pneumococcal vaccine refused 8/17/2016     Pneumonia due to Streptococcus pneumoniae 4/5/2018    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/2015    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    s/p Right Total knee replacement 5/15/2013    Scoliosis of thoracic spine 5/15/2013    Small vessel disease, cerebrovascular 12/28/2017    Stroke     Vascular dementia 12/6/2017       Objective:     Current Respiratory Status: 04/25/18    Alert: yes; lethargic    Cooperative: yes    Follows Directions: yes    Visualization  · Patient was seen in the lateral view  · Supplement O2 in place via nasal cannula    Oral Peripheral Examination  · Oral Musculature: general weakness  · Dentition: present and adequate  · Secretion Management: adequate  · Mucosal Quality: dry  · Mandibular Strength and Mobility: impaired (generalized weakness )  · Oral Labial Strength and Mobility: functional seal, impaired pursing  · Lingual Strength and Mobility: impaired strength, functional protrusion, functional lateral movement  · Volitional Cough: elicited, productive   · Volitional Swallow: elicited, inconsistent timing of initiation noted across trials via digital palpation of the larynx   · Voice Prior to PO Intake: mildly hoarse, mildly decresaed articulatory precision     Consistencies Assessed  · Thin tsp x1  · Nectar thick tsp x2, cup x1  · Honey thick   · Puree tsp bite x2  · Solids cracker x1    Oral Preparation/Oral Phase  · Poor bolus formation and control  · prolonged A-P transfer  · prolonged mastication  · Oral residue present post swallow  · Decreased base of tongue mobility  · Premature spillage   · Lingual pumping with solid trial    Pharyngeal Phase   Patient presents with severe pharyngeal dysphagia c/b decreased BOT retraction, pharyngeal wall constriction, pharyngeal sensation, and hyolaryngeal excursion resulting in penetration with nectar thick liquids, pooling in the vallecula prior to initiation of pharyngeal swallow with all trials, severe  vallecular stasis with all trials, and moderate scattered pharyngeal and piriform sinus stasis with all trials. Cued cough adequately cleared penetrated material. Cued multiple and effortful swallows were not effective in clearing stasis. Cued chin tuck reduced pahryngeal stasis after the swallow, however only to moderate-severe amounts. Patient unable to expectorate residue back into oral cavity via effortful coughs. Patient with increased fatigue as session continued.     Cervical Esophageal Phase  · Decreased UES opening   · Improved with cued chin tuck    Assessment:     Impressions  ·   Patient demonstrates severe Oropharyngeal Dysphagia characterized by impaired oral motor strength and cooridnation, BOT retraction, pharyngeal sensation, pharygneal wall constriction, hyolarygneal excursion, and UES opening resulting in penetration during the swallow of nectar thick liquids cleared with a cued cough and severe pharyngeal stasis which she is unable to adequately clear despite effort placing patient at a high risk to aspirate all po intake.    Prognosis: Guarded    Barriers:  · Fatigue  · Cognitive status  · debility    Plan  ST will continue to follow to initiate dysphagia therapy exercises. Recommend consideration of alternative means of nutrition, hydration, and medication.     Education  Results were discussed with patient. Results were discussed with Medical Team who was in agreement with plan.     Goals:    SLP Goals        Problem: SLP Goal    Goal Priority Disciplines Outcome   SLP Goal     SLP Ongoing (interventions implemented as appropriate)   Description:  Speech Therapy Short Term Goals  Goal expected to be met by 5/2  1. Patient will complete dysphagia therapy exercises x10 with min cues.    Speech Language Pathology Goals  Goals expected to be met by 4/30  1. Patient will participate in ongoing swallow assessment                        Plan:   · Patient to be seen:  Therapy Frequency: 5 x/week    · Plan of Care expires:  05/23/18  · Plan of Care reviewed with:  patient        Discharge recommendations:  nursing facility, skilled       Time Tracking:   SLP Treatment Date:   04/25/18  Speech Start Time:  1510  Speech Stop Time:  1535     Speech Total Time (min):  25 min    XUAN Platt, CCC-SLP   Pager: [phone]  04/25/2018

## 2018-04-25 NOTE — PLAN OF CARE
CM to bedside to f/u on SNF list that was left yesterday by ICU SW - pt's sister provided SNF choices of 1) [facility] 2) [facility]. CM emailed @[facility] for referral info to be sent and updated covering SW. Pt's d/c is pending swallow study.     04/25/18 1216   Discharge Reassessment   Assessment Type Discharge Planning Reassessment   Provided patient/caregiver education on the expected discharge date and the discharge plan Yes   Do you have any problems affording any of your prescribed medications? No   Discharge Plan A Skilled Nursing Facility   Discharge Plan B Home with family;Home Health   Patient choice form signed by patient/caregiver N/A   Can the patient answer the patient profile reliably? Yes, cognitively intact   How does the patient rate their overall health at the present time? Fair   Describe the patient's ability to walk at the present time. Major restrictions/daily assistance from another person   How often would a person be available to care for the patient? Often   Number of comorbid conditions (as recorded on the chart) Three

## 2018-04-25 NOTE — PLAN OF CARE
Problem: Patient Care Overview  Goal: Plan of Care Review  Outcome: Ongoing (interventions implemented as appropriate)  Pt is AAOx3; disoriented to time. AVSS overnight. BG monitored Q4; BG trending down. Team notified; D5 initiated @ 75ml/hr. Pt tolerated PO meds crushed with apple sauce with no s/s of aspiration. Hill remains in place for UR. No acute events/changes occurred throughout shift. Remains free from fall/injury. MBS to be done today. WCTM.

## 2018-04-25 NOTE — ASSESSMENT & PLAN NOTE
--trend BMP's.   --failed swallow study yesterday  - placed on D5 for 5 hr due to 2.8L free water deficit, will need additional fluid, but will re-check and assess rate  - currently on D5 gtt at 75 mL/hr

## 2018-04-25 NOTE — SIGNIFICANT EVENT
Notified by patient's nurse of concern of blood glucose level declining after D5 completed and patient still NPO.  Noted on lab review that patient is hypernatremic w/ Na 152; calculated continuous D5 infusion to correct to goal 140 over next 48 hr 68mL/hr; will run at 75mL/hr and follow q6 BMP.

## 2018-04-25 NOTE — PROGRESS NOTES
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 She was seen by her PCP on 4/2 for these complaints and received a prescription for duo nebs and tessalon perles. CXR performed on 4/2 noted bilateral basilar infiltrates. At that time, she denied fever, chills, sputum production, sick contacts. She was admitted to  and treated emprically for CAP with rocephin and azithromycin. She was also diuresed daily. However, her oxygen requirements slowly increased from 2 L NC to 4 L NC with also progressively worsening bilateral infiltrates on imaging. CTA performed on 4/9 negative for PE, however noted significant bilateral consolidations. On 4/11, her hypoxemia continued to worsen, requiring NRB mask, and she developed severe respiratory distress. She was transferred to the MICU and emergently intubated. Repeat CT chest performed on 4/11 demonstrated progressive worsening of bilateral peripheral infiltrates.    Hospital Course:  Admitted to MICU on 4/11, intubated emergently for severe hypoxemia and respiratory distress. ID was consulted for assistance in management given immunocompromised state. Patient paralyzed with nimbex following persistent dysynchrony with vent despite sedation. Abx broadened to vancomycin, zosyn, bactrim, and  posaconazole. Norepinephrine initiated for BP support. Bronchoscopy performed at bedside on 4/11 and all cultures including fungal cultures negative. Nimbex discontinued on 4/12. She continued to require significant oxygen support thereafter, and was unable to wean oxygen requirements significantly since intubation. However, she has made slow improvements in her oxygenation since 4/18. She went into A-fib RVR in 120-160's on the evening of 4/17, and was briefly started on amiodarone gtt; this discontinued and metoprolol added with good rate control on 4/18. SAT/SBT continue daily with difficulty weaning her off fentanyl as she gets agitated tachycardic and hypertensive ('s). Valium initiated in effort to wean fentanyl off. Patient extubated on 4/22 to nasal cannula  4/24/2018 - Pt with chills/cold overnight & refusing meds, willing to take meds this morning. Hill placed this AM with good UOP. Pt still NPO per swallowing trial. Free water deficit 2.8L, starting with 500mL D5, will reassess, Pt oriented x3 this morning. Re-evaluate swallowing.  04/25/2018: Stepped down to hospital medicine. No acute events overnight, patient to have swallow evaluation today. Respiratory status stable, on 2L NC.     Interval History: Stepped down to hospital medicine, no acute events overnight. Patient to undergo swallow study today.     Review of Systems   Constitutional: Negative for chills, fever and unexpected weight change.   HENT: Negative for hearing loss.    Eyes: Negative for visual disturbance.   Respiratory: Negative for shortness of breath and wheezing.    Cardiovascular: Positive for leg swelling. Negative for chest pain.   Gastrointestinal: Negative for abdominal pain, constipation, diarrhea, nausea and vomiting.   Genitourinary: Negative for dysuria.   Musculoskeletal: Negative for arthralgias.   Skin: Negative for rash.   Neurological: Negative for seizures.   Hematological: Negative for adenopathy. Does not  bruise/bleed easily.   Psychiatric/Behavioral: Negative for agitation, behavioral problems and confusion.     Objective:     Vital Signs (Most Recent):  Temp: 98.3 °F (36.8 °C) (04/25/18 0839)  Pulse: 77 (04/25/18 0839)  Resp: 18 (04/25/18 0839)  BP: (!) 142/65 (04/25/18 0839)  SpO2: 96 % (04/25/18 0839) Vital Signs (24h Range):  Temp:  [97.6 °F (36.4 °C)-99.3 °F (37.4 °C)] 98.3 °F (36.8 °C)  Pulse:  [70-93] 77  Resp:  [18-34] 18  SpO2:  [94 %-100 %] 96 %  BP: (137-147)/(61-71) 142/65     Weight: 71.5 kg (157 lb 10.1 oz)  Body mass index is 26.23 kg/m².    Intake/Output Summary (Last 24 hours) at 04/25/18 0924  Last data filed at 04/25/18 0527   Gross per 24 hour   Intake              790 ml   Output             1225 ml   Net             -435 ml      Physical Exam   Constitutional: She is oriented to person, place, and time. She appears well-developed and well-nourished.   HENT:   Head: Normocephalic and atraumatic.   Eyes: EOM are normal. Pupils are equal, round, and reactive to light.   Neck: No tracheal deviation present. No thyromegaly present.   Cardiovascular: Normal rate.    No murmur heard.  Pulmonary/Chest: Effort normal. She has no wheezes. She has no rales.   Abdominal: Bowel sounds are normal. There is no tenderness.   Musculoskeletal: She exhibits edema (BLLE 1+). She exhibits no deformity.   Neurological: She is alert and oriented to person, place, and time. She exhibits normal muscle tone.   Skin: No rash noted. No erythema.   Vitals reviewed.      Significant Labs:   BMP:   Recent Labs  Lab 04/25/18  0832      *   K 3.5   *   CO2 27   BUN 39*   CREATININE 0.8   CALCIUM 8.4*   MG 2.0     CBC:   Recent Labs  Lab 04/24/18  0235 04/25/18  0832   WBC 23.33* 18.79*   HGB 9.4* 8.7*   HCT 31.7* 29.6*    206       Significant Imaging: I have reviewed all pertinent imaging results/findings within the past 24 hours.    Assessment/Plan:      * Acute hypoxemic respiratory failure     --Suspect secondary to severe bilateral pneumonia in the setting of immunosuppression; possibly  vs reaction from Humira   --Severely worsened over first week of admission despite CAP treatment.  --s/p emergent intubation on 4/11 upon MICU admission for severe hypoxemia and severe respiratory distress.  --Bronchoscopy on 4/11 with wash; culture with few WBCs, no organisms.  --Antibiotics broadened to vancomycin, zosyn, bactrim, and posaconazole initally; stopped now  --Blood, sputum, and urine cultures negative from 4/10 and 4/18  --KOH prep negative, AFB stain with no acid fast bacilli noted, fungal culture negative  --Aspergillus, Histoplasma, and Cryptococcal antigen in blood is negative. Legionella and blasomyces negative.   --Extubated on 4/22  - PO pred 60 qd for possibility of , duo nebs PRN for wheezing  - On Bactrim for PJP PPx currently  - Pt currently on 3L NC and sating well, with slightly decreased lung sounds in BLLL  - stepped down to hospital medicine 04/25/18          Hypokalemia    -- monitor and replace as necessary           Hypernatremia    --trend BMP's.   --failed swallow study yesterday  - placed on D5 for 5 hr due to 2.8L free water deficit, will need additional fluid, but will re-check and assess rate  - currently on D5 gtt at 75 mL/hr           Leukocytosis    -- Etiology unclear  --ID recommend  ---- complete 7 day zosyn course as planned today for possible HAP/aspiration   ---- stopped posaconazole (mold species coverage)   ---- all fungal markers are negative  ---  continue steroids for now (cannot definitively exclude /HP)  ---- BAL cultures negative    ---- await pending repeat cultures from 4/19 to exclude new nosocomial infection -NGTD  ----  bactrim prophylaxis reinstated per ID recs  - Pt currently with some chills, and still elevated WBC count (possibly due to steroids), no fevers          Pneumonia of both upper lobes due to infectious organism     --see above  -- CXR  yesterday- no significant change          Thyroid enlargement    --History of Hashimoto's thyroiditis.  --Intrathoracic extension on CT.  --TSH and free T4 wnl.          Pneumonia due to Streptococcus pneumoniae    - 3/4 SIRS criteria:  HR >90, RR >20, WBC >12k in ED, Satting 92% on 2L NC  - Suspect lung as the cause (seen on CXR)  - Duonebs q4h while awake and prn  - Guaifenesin and Codeine cough syrup prn cough and Tesslon Perles  - Refused Pneumonia vaccine previously  - will expand antibiotics to anaerobic coverage due to rising WBC and lack of clinical improvement.              Cervical dystonia    - Chronic and stable  - Continue Gabapentin 300mg PO TID  - Continue Baclofen 10mg PO TID          Cerebrovascular accident (CVA)    --Remote lacunar infarcts in the right centrum semi-ovale, basal ganglia, and right tatyana per CTH w/o contrast on 12/2017.  --Continue home clopidogrel.  -- PT/OT          Vascular dementia    - Chronic and stable  - Continue Plavix 75mg PO daily             Long term (current) use of systemic steroids    --see above          Hypertension, essential    - pt asymptomatic with essential HTN, on amlodipine, metoprolol        Seropositive rheumatoid arthritis of multiple sites    --Appreciate Rheumatology assistance.  --Continue hydroxychloroquine.  -- switched to prednisone 60mg PO QD.            Fatty liver    - Chronic  - LFTs more elevated then normal  - Continue to monitor          PUD (peptic ulcer disease)    --Continue PPI            VTE Risk Mitigation         Ordered     enoxaparin injection 40 mg  Daily      04/25/18 6155              Rashad Ramsey MD  Department of Hospital Medicine   [facility]

## 2018-04-25 NOTE — PLAN OF CARE
Problem: SLP Goal  Goal: SLP Goal  Speech Therapy Short Term Goals  Goal expected to be met by 5/2  1. Patient will complete dysphagia therapy exercises x10 with min cues.    Speech Language Pathology Goals  Goals expected to be met by 4/30 1. Patient will participate in ongoing swallow assessment      Outcome: Ongoing (interventions implemented as appropriate)  MBSS completed. Penetration with nectar thick liquids only, however, severe pharyngeal stasis with all trials which patient is unable to adequately clear places patient at a high risk to aspirate all po intake. Recommend: NPO, excellent and frequent oral care, consider alternative means of nutrition/hydration/medication.   LENORA Thomason., CCC-SLP  Pager: [phone]  04/25/2018

## 2018-04-25 NOTE — ASSESSMENT & PLAN NOTE
--Suspect secondary to severe bilateral pneumonia in the setting of immunosuppression; possibly  vs reaction from Humira   --Severely worsened over first week of admission despite CAP treatment.  --s/p emergent intubation on 4/11 upon MICU admission for severe hypoxemia and severe respiratory distress.  --Bronchoscopy on 4/11 with wash; culture with few WBCs, no organisms.  --Antibiotics broadened to vancomycin, zosyn, bactrim, and posaconazole initally; stopped now  --Blood, sputum, and urine cultures negative from 4/10 and 4/18  --KOH prep negative, AFB stain with no acid fast bacilli noted, fungal culture negative  --Aspergillus, Histoplasma, and Cryptococcal antigen in blood is negative. Legionella and blasomyces negative.   --Extubated on 4/22  - PO pred 60 qd for possibility of , duo nebs PRN for wheezing  - On Bactrim for PJP PPx currently  - Pt currently on 3L NC and sating well, with slightly decreased lung sounds in BLLL  - stepped down to hospital medicine 04/25/18

## 2018-04-25 NOTE — ASSESSMENT & PLAN NOTE
-- Etiology unclear  --ID recommend  ---- complete 7 day zosyn course as planned today for possible HAP/aspiration   ---- stopped posaconazole (mold species coverage)   ---- all fungal markers are negative  ---  continue steroids for now (cannot definitively exclude /HP)  ---- BAL cultures negative    ---- await pending repeat cultures from 4/19 to exclude new nosocomial infection -NGTD  ----  bactrim prophylaxis reinstated per ID recs  - Pt currently with some chills, and still elevated WBC count (possibly due to steroids), no fevers

## 2018-04-26 PROBLEM — R13.12 OROPHARYNGEAL DYSPHAGIA: Status: ACTIVE | Noted: 2018-04-26

## 2018-04-26 PROBLEM — E87.6 HYPOKALEMIA: Status: RESOLVED | Noted: 2018-04-24 | Resolved: 2018-04-26

## 2018-04-26 LAB
ANION GAP SERPL CALC-SCNC: 8 MMOL/L
ANION GAP SERPL CALC-SCNC: 9 MMOL/L
BUN SERPL-MCNC: 31 MG/DL
BUN SERPL-MCNC: 35 MG/DL
CALCIUM SERPL-MCNC: 7.8 MG/DL
CALCIUM SERPL-MCNC: 8 MG/DL
CHLORIDE SERPL-SCNC: 114 MMOL/L
CHLORIDE SERPL-SCNC: 115 MMOL/L
CO2 SERPL-SCNC: 21 MMOL/L
CO2 SERPL-SCNC: 23 MMOL/L
CREAT SERPL-MCNC: 0.7 MG/DL
CREAT SERPL-MCNC: 0.8 MG/DL
EST. GFR  (AFRICAN AMERICAN): >60 ML/MIN/1.73 M^2
EST. GFR  (AFRICAN AMERICAN): >60 ML/MIN/1.73 M^2
EST. GFR  (NON AFRICAN AMERICAN): >60 ML/MIN/1.73 M^2
EST. GFR  (NON AFRICAN AMERICAN): >60 ML/MIN/1.73 M^2
GLUCOSE SERPL-MCNC: 88 MG/DL
GLUCOSE SERPL-MCNC: 92 MG/DL
POCT GLUCOSE: 100 MG/DL (ref 70–110)
POCT GLUCOSE: 106 MG/DL (ref 70–110)
POCT GLUCOSE: 113 MG/DL (ref 70–110)
POCT GLUCOSE: 191 MG/DL (ref 70–110)
POTASSIUM SERPL-SCNC: 3.6 MMOL/L
POTASSIUM SERPL-SCNC: 4.3 MMOL/L
SODIUM SERPL-SCNC: 144 MMOL/L
SODIUM SERPL-SCNC: 146 MMOL/L

## 2018-04-26 PROCEDURE — 63600175 PHARM REV CODE 636 W HCPCS: Performed by: HOSPITALIST

## 2018-04-26 PROCEDURE — 97530 THERAPEUTIC ACTIVITIES: CPT

## 2018-04-26 PROCEDURE — 80048 BASIC METABOLIC PNL TOTAL CA: CPT | Mod: 91

## 2018-04-26 PROCEDURE — 25000003 PHARM REV CODE 250: Performed by: CLINICAL NURSE SPECIALIST

## 2018-04-26 PROCEDURE — 97110 THERAPEUTIC EXERCISES: CPT

## 2018-04-26 PROCEDURE — 20600001 HC STEP DOWN PRIVATE ROOM

## 2018-04-26 PROCEDURE — 97803 MED NUTRITION INDIV SUBSEQ: CPT

## 2018-04-26 PROCEDURE — 99233 SBSQ HOSP IP/OBS HIGH 50: CPT | Mod: GC,,, | Performed by: INTERNAL MEDICINE

## 2018-04-26 PROCEDURE — 25000003 PHARM REV CODE 250: Performed by: NURSE PRACTITIONER

## 2018-04-26 PROCEDURE — 97535 SELF CARE MNGMENT TRAINING: CPT

## 2018-04-26 PROCEDURE — 63600175 PHARM REV CODE 636 W HCPCS: Performed by: NURSE PRACTITIONER

## 2018-04-26 PROCEDURE — 36415 COLL VENOUS BLD VENIPUNCTURE: CPT

## 2018-04-26 RX ORDER — SULFAMETHOXAZOLE AND TRIMETHOPRIM 200; 40 MG/5ML; MG/5ML
20 SUSPENSION ORAL
Status: DISCONTINUED | OUTPATIENT
Start: 2018-04-27 | End: 2018-04-27

## 2018-04-26 RX ADMIN — AMLODIPINE BESYLATE 5 MG: 5 TABLET ORAL at 10:04

## 2018-04-26 RX ADMIN — Medication 12.5 MG: at 10:04

## 2018-04-26 RX ADMIN — CLOPIDOGREL 75 MG: 75 TABLET, FILM COATED ORAL at 09:04

## 2018-04-26 RX ADMIN — HYDROXYCHLOROQUINE SULFATE 400 MG: 200 TABLET, FILM COATED ORAL at 10:04

## 2018-04-26 RX ADMIN — PREDNISONE 60 MG: 20 TABLET ORAL at 10:04

## 2018-04-26 RX ADMIN — PANTOPRAZOLE SODIUM 40 MG: 40 GRANULE, DELAYED RELEASE ORAL at 10:04

## 2018-04-26 RX ADMIN — ENOXAPARIN SODIUM 40 MG: 100 INJECTION SUBCUTANEOUS at 06:04

## 2018-04-26 NOTE — PLAN OF CARE
CM contacted Shyam w/ [facility] to inquire if pt can transfer to facility w/ a ld-tube/naso-duodenal tube. Shyam stated the facility accepts no naso-gastric tubes of any kind - NGT, ld, naso-duodenal, etc. CM will relay to IM4 team. Shyam stated that pt's family toured facility today.

## 2018-04-26 NOTE — PLAN OF CARE
Problem: Patient Care Overview  Goal: Plan of Care Review  Outcome: Ongoing (interventions implemented as appropriate)  Pt and family updated of POC throughout shift.  Pt remains NPO before and after modified barium study completed.  Pt on D5 gtt at 100 mL/h.  Accuchecks q 4h.  Pt on 2L with O2 sats >92%.  Hill removed per MD orders.  Pt has yet to void.  Bladder scan performed which showed 127cc.  Oral care performed and pt repositioned appropriately.  Meds crushed and given in applesauce.  Pt tolerated well.  No falls or injuries occurred.  WCTM.

## 2018-04-26 NOTE — PT/OT/SLP PROGRESS
"Speech Language Pathology Treatment    Patient Name:  Selma Alonzo Lux MD   MRN:  [id number]   1012/1012 A    Admitting Diagnosis: Pneumonia of both upper lobes due to infectious organism    Recommendations:                General Recommendations:  Dysphagia therapy  Diet recommendations:  NPO, NPO   Aspiration Precautions: Alternate means of nutrition/hydration, Frequent oral care and Strict aspiration precautions   General Precautions: Standard, fall, aspiration, NPO  Communication strategies:  none    Subjective     Patient awake.   Patient states, "I don't believe in any little part of anything ya'll do." -in reference to Speech Therapists  Patient goals: to leave hospital    Pain/Comfort:  ·      Objective:     Has the patient been evaluated by SLP for swallowing?   Yes  Keep patient NPO? Yes   Current Respiratory Status: nasal cannula      Patient seen to provide further education on MBSS results and initiate dysphagia therapy exercises. Upon SLP entry, patient expressing agitation with MBSS stating, "I don't feel confident that I was given an accurate test." Patient unable to clarify. SLP discussed high aspiration risk 2/2 severe-profound residue following all trials which she was unable to clear. Patient verbalized understanding, however, will likely require reinforcement. SLP discussed POC and attempted to introduce dyspahgia therapy exercises. Patient refusing to complete swallowing exercises this date. She stated, "I don't believe in any little part of anything ya'll [Speech Therapists] do." SLP encouraged patient to initiate swallowing exercises to improve swallowing. Patient stated, "I want to hear what the doctor has to say. You can come back tomorrow after I talk to the doctor." White board current. No further questions.     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  ST will continue to follow.   Goals:    SLP Goals        Problem: SLP Goal    " Goal Priority Disciplines Outcome   SLP Goal     SLP Ongoing (interventions implemented as appropriate)   Description:  Speech Therapy Short Term Goals  Goal expected to be met by 5/2  1. Patient will complete dysphagia therapy exercises x10 with min cues.    Speech Language Pathology Goals  Goals expected to be met by 4/30 1. Patient will participate in ongoing swallow assessment                        Plan:     · Patient to be seen:  5 x/week   · Plan of Care expires:  05/23/18  · Plan of Care reviewed with:  patient   · SLP Follow-Up:  Yes       Discharge recommendations:  nursing facility, skilled       Time Tracking:     SLP Treatment Date:   04/26/18  Speech Start Time:  0833  Speech Stop Time:  0845     Speech Total Time (min):  12 min    Billable Minutes: Seld Care/Home Management Training 12    XUAN Platt, CCC-SLP   Pager: [phone]  04/26/2018

## 2018-04-26 NOTE — PT/OT/SLP PROGRESS
Occupational Therapy   Treatment    Name: Selma Lux MD  MRN: [id number]  Admitting Diagnosis:  Pneumonia of both upper lobes due to infectious organism       Recommendations:     Discharge Recommendations: nursing facility, skilled  Discharge Equipment Recommendations:   (TBD)  Barriers to discharge:  Decreased caregiver support    Subjective     Communicated with: patient prior to session.  Pain/Comfort:  · Pain Rating 1:  (patient did not rate)  · Location - Side 1: Bilateral  · Location - Orientation 1: generalized  · Location 1:  (LEs with movement)  · Pain Addressed 1: Reposition, Distraction  · Pain Rating Post-Intervention 1:  (patient did not rate)    Patients cultural, spiritual, [employer] conflicts given the current situation: none stated    Objective:     Patient found with: telemetry, pulse ox (continuous), oxygen, peripheral IV    General Precautions: Standard, fall, aspiration, NPO   Orthopedic Precautions:N/A   Braces: N/A     Occupational Performance:    Bed Mobility:    · Patient completed Rolling/Turning to Right with total assistance and 2 persons  · Patient completed Scooting/Bridging with total assistance and 2 persons to HOB lying supine     Activities of Daily Living:  · Toileting: total assistance urination on bedpan    Patient left HOB elevated /c B UE and  B LEs positioned on pillow to decrease edema and pressure sores with call button in reach and all needs met.    [facility] 6 Click:  [facility] Total Score: 6    Treatment & Education:  Patient performed R and L UE AAROM exercises and stretching in all available planes and joints including wrists and digits in order to facilitate normal movement in order to increase independence with ADLs.   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 Performance deficits affecting function are weakness, impaired endurance, impaired self care skills,  gait instability, impaired functional mobilty, decreased lower extremity function, decreased upper extremity function, impaired balance.  Patient tolerated treatment session, but needs a significant amount of assistance with ADLs and functional mobility.    Rehab Prognosis:  good; patient would benefit from acute skilled OT services to address these deficits and reach maximum level of function.       Plan:     Patient to be seen 5 x/week to address the above listed problems via self-care/home management, therapeutic activities, therapeutic exercises  · Plan of Care Expires: 05/06/18  · Plan of Care Reviewed with: patient    This Plan of care has been discussed with the patient who was involved in its development and understands and is in agreement with the identified goals and treatment plan    GOALS:    Occupational Therapy Goals        Problem: Occupational Therapy Goal    Goal Priority Disciplines Outcome Interventions   Occupational Therapy Goal     OT, PT/OT Ongoing (interventions implemented as appropriate)    Description:  Goals to be met by: 5/5/18    Patient will increase functional independence with ADLs by performing:  Pt to follow all simple commands to allow for improved participation and engagement with ADL skills.--MET  Pt to perform basic g/h skills in sitting with MIN A  Pt/fly to be independent with ROM/positioning skills.  Pt to tolerate sitting EOB with Fair balance in prep for furher ADL and transfer training.                             Time Tracking:     OT Date of Treatment: 04/26/18  OT Start Time: 1015  OT Stop Time: 1053  OT Total Time (min): 38 min    Billable Minutes:Self Care/Home Management 15  Therapeutic Exercise 23  AYAD Higuera  4/26/2018

## 2018-04-26 NOTE — PROGRESS NOTES
"  [facility]  Adult Nutrition  Consult Note    SUMMARY     Recommendations    1. If able to advance diet, recommend regular diet (texture per SLP).   2. If unable to advance diet, resume enteral nutrition. Recommend Glucerna 1.5 @ 40 mL/hr to provide 1440 calories, 79 g of protein, 729 mL fluid.   3. RD to monitor & follow-up.    Goals: Meet % EEN, EPN  Nutrition Goal Status: goal not met  Communication of RD Recs: reviewed with RN    Reason for Assessment    Reason for Assessment: RD follow-up  Diagnosis: other (see comments) (respiratory failure)  Relevant Medical History: RA, anemia, arthritis, HTN, stroke, hasimoto's  Interdisciplinary Rounds: attended    General Information Comments: ST recommends pt remains NPO.  Nutrition Discharge Planning: Unable to determine    Nutrition/Diet History    Patient Reported Diet/Restrictions/Preferences: other (see comments) (KERRY)  Typical Food/Fluid Intake: Pt remains NPO.  Food Preferences: No Scientology/cultural food preferences at this time  Do you have any cultural, spiritual, Scientology conflicts, given your current situation?: none reported   Factors Affecting Nutritional Intake: NPO, difficulty/impaired swallowing    Anthropometrics    Temp: 97.6 °F (36.4 °C)  Height Method: Stated  Height: 5' 5" (165.1 cm)  Height (inches): 65 in  Weight Method: Bed Scale  Weight: 71.5 kg (157 lb 10.1 oz)  Weight (lb): 157.63 lb  Ideal Body Weight (IBW), Female: 125 lb  % Ideal Body Weight, Female (lb): 126.1 lb  BMI (Calculated): 26.3  BMI Grade: 25 - 29.9 - overweight    Lab/Procedures/Meds    Pertinent Labs Reviewed: reviewed  Pertinent Labs Comments: Na 146, BUN 35  Pertinent Medications Reviewed: reviewed  Pertinent Medications Comments: D5    Physical Findings/Assessment    Overall Physical Appearance: nourished  Tubes: other (see comments) (-)  Skin: intact    Estimated/Assessed Needs    Weight Used For Calorie Calculations: 68 kg (149 lb 14.6 oz) " (Dosing wt)  Energy Calorie Requirements (kcal): 1438 kcal/d  Energy Need Method: [place]-St Jeor (1.25 PAL)     Protein Requirements: 82 g/d (1.2 g/kg)  Weight Used For Protein Calculations: 68 kg (149 lb 14.6 oz)     Fluid Requirements (mL): 1mL/kcal or per MD    Nutrition Prescription Ordered    Current Diet Order: NPO  Current Nutrition Support Formula Ordered: Discontinued     Nutrition Risk    Level of Risk/Frequency of Follow-up: high     Assessment and Plan    Nutrition Problem  Inadequate energy intake     Related to (etiology):   TF provision     Signs and Symptoms (as evidenced by):   Pt receiving <85% EEN and EPN.      Nutrition Diagnosis Status:   Continues      Monitor and Evaluation    Food and Nutrient Intake: energy intake, food and beverage intake, enteral nutrition intake  Food and Nutrient Adminstration: diet order, enteral and parenteral nutrition administration  Physical Activity and Function: nutrition-related ADLs and IADLs  Anthropometric Measurements: weight change, weight  Biochemical Data, Medical Tests and Procedures: inflammatory profile, lipid profile, glucose/endocrine profile, gastrointestinal profile, electrolyte and renal panel  Nutrition-Focused Physical Findings: overall appearance     Nutrition Follow-Up    RD Follow-up?: Yes

## 2018-04-26 NOTE — PLAN OF CARE
Problem: SLP Goal  Goal: SLP Goal  Speech Therapy Short Term Goals  Goal expected to be met by 5/2  1. Patient will complete dysphagia therapy exercises x10 with min cues.    Speech Language Pathology Goals  Goals expected to be met by 4/30 1. Patient will participate in ongoing swallow assessment       Outcome: Ongoing (interventions implemented as appropriate)  Goals remain appropriate. ST will continue to follow.   LENORA Thomason., CCC-SLP  Pager: [phone]  04/26/2018

## 2018-04-26 NOTE — PLAN OF CARE
PASRR/142 received and placed in chart.  Forms inputted in right care.    Morris Underwood LMSW  Ext. [phone]

## 2018-04-26 NOTE — PLAN OF CARE
Problem: Occupational Therapy Goal  Goal: Occupational Therapy Goal  Goals to be met by: 5/5/18    Patient will increase functional independence with ADLs by performing:  Pt to follow all simple commands to allow for improved participation and engagement with ADL skills.--MET  Pt to perform basic g/h skills in sitting with MIN A  Pt/fly to be independent with ROM/positioning skills.  Pt to tolerate sitting EOB with Fair balance in prep for furher ADL and transfer training.            Outcome: Ongoing (interventions implemented as appropriate)  Patient's goals are appropriate.   AYAD Higuera  4/26/2018

## 2018-04-26 NOTE — NURSING
VSS. AOX3(disor.Situation). Pt  To receive ld tube placement tomorrow,but there is a discrepentency about the facility not being able to have NGT at receiving facility per  Case management. 650 ml / 1 bm noted today. PT worked with Pt. Pt reluctant to work with Speech.  Pt given swabs around mouth and Ice chips on toungue and lips periodically to help with mouth dryness. Call button inreach,bed low and locked. SR upx2. Bed alarm on.

## 2018-04-26 NOTE — PLAN OF CARE
Problem: Patient Care Overview  Goal: Plan of Care Review  Outcome: Ongoing (interventions implemented as appropriate)  Pt is AAOX2; occasionally disoriented to time & situation. AVSS overnight. Meds crushed with apple sauce; pt tolerated well with no issues. C/o some discomfort in arms R/T edema/fluid; Elevated BUE's on pillows. Maintained D5 @ 100ml/hr. BG monitored Q4; insulin coverage administered according to sliding scale. Pt continues to experience urinary retention post-parikh removal. Bladder scan resulted 450cc; team notified. Straight cath administered with 500cc UO. New orders for PRN straight cath Q6 for bladder scan >300cc. No acute events/changes occurred. Remains free from fall/injury. WCTM.

## 2018-04-26 NOTE — PLAN OF CARE
[facility] [place] - pt's d/c is pending resolution of swallow issues.

## 2018-04-26 NOTE — PT/OT/SLP PROGRESS
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  She presents with the following impairments/functional limitations:  weakness, impaired balance, impaired endurance, gait instability, decreased lower extremity function, decreased safety awareness, impaired sensation, decreased coordination, impaired cognition, impaired functional mobilty, impaired coordination, impaired cardiopulmonary response to activity.  During today's session, pt with incr core activation with participation in rolling and ability to sit upright with assistance while edge of bed. Limited due to incr weakness resulting in significant fatigue once perform activity. Appropriate for continued skilled services to assist with current deficits.     Rehab Prognosis:  Good; patient would benefit from acute skilled PT services to address these deficits and reach maximum level of function.      Recent Surgery: * No surgery found *      Plan:     During this hospitalization, patient to be seen 5 x/week to address the above listed problems via gait training, therapeutic activities, therapeutic exercises, neuromuscular re-education  · Plan of Care Expires:  05/12/18   Plan of Care Reviewed with: patient    Subjective     Communicated with nsg prior to session.  Patient found supine in bed upon PT entry to room, agreeable to treatment.      Chief Complaint: being on bed pan  Patient comments/goals: "I thought I heard my daughter" per pt during session.   Pain/Comfort:  · Pain Rating 1:  (did not rate)  · Location - Side 1: Right  · Location 1: knee  · Pain Addressed 1: Reposition, Distraction, Cessation of " Activity  · Pain Rating Post-Intervention 1: 0/10    Patients cultural, spiritual, Restorationist conflicts given the current situation: none reported     Objective:     Patient found with: telemetry, pulse ox (continuous), oxygen, peripheral IV     General Precautions: Standard, fall, aspiration   Orthopedic Precautions:N/A   Braces: N/A     Functional Mobility  Bed Mobility  Scooting: total assistance  Supine to Sit: total assistance   Sit to Supine: total assistance         Balance   Static Sitting moderate assistance   *Pt sat EOB for 10 minutes with (B) UE support and incr kyphosis and posterior pelvic tilt. Req cueing to sustain balance.    Pt performed unilateral L lean with MIN A and able to sustain with minimal difficulty.    Dynamic Sitting maximal assistance           AM-PAC 6 CLICK MOBILITY  Turning over in bed (including adjusting bedclothes, sheets and blankets)?: 2  Sitting down on and standing up from a chair with arms (e.g., wheelchair, bedside commode, etc.): 1  Moving from lying on back to sitting on the side of the bed?: 2  Moving to and from a bed to a chair (including a wheelchair)?: 1  Need to walk in hospital room?: 1  Climbing 3-5 steps with a railing?: 1  Total Score: 8       Therapeutic Activities and Exercises:  Pt found on bedpan with therapist assisting pt with perianal cleaning and donning of new gown and padding. Pt performed roll to (B) directions. Rolling to R with MAX A, rolling to L with TOTAL A with inability to sustain hold on L side due to R hand with moderate edema. After performing this, pt performed edge of bed activity.      White board updated. In bed mobility with nsg only at current time. Please utilize bed to chair functioning to assist with abdominal activation.    Patient left HOB elevated with all lines intact and call button in reach..    GOALS:    Physical Therapy Goals        Problem: Physical Therapy Goal    Goal Priority Disciplines Outcome Goal Variances  Interventions   Physical Therapy Goal     PT/OT, PT Ongoing (interventions implemented as appropriate)     Description:  Goals to be met by: 18     Patient will increase functional independence with mobility by performin. Supine to sit with Moderate Assistance - not met  2. Sit to supine with Moderate Assistance - not met  3. Sit to stand transfer with Moderate Assistance - not met  4. Bed to chair transfer with Moderate Assistance using LRAD  5. Gait  x 20 feet with Moderate Assistance using LRAD    6. Ascend/descend 5 stair with bilateral Handrails Moderate Assistance using Single-point Cane .   7. Lower extremity exercise program x20 reps per handout, with supervision                       Time Tracking:     PT Received On: 18  PT Start Time: 1435     PT Stop Time: 1505  PT Total Time (min): 30 min     Billable Minutes: Therapeutic Activity 30    Treatment Type: Treatment  PT/PTA: PT     PTA Visit Number: 1     Candi Ram, PT  2018

## 2018-04-26 NOTE — PLAN OF CARE
Problem: Physical Therapy Goal  Goal: Physical Therapy Goal  Goals to be met by: 18     Patient will increase functional independence with mobility by performin. Supine to sit with Moderate Assistance - not met  2. Sit to supine with Moderate Assistance - not met  3. Sit to stand transfer with Moderate Assistance - not met  4. Bed to chair transfer with Moderate Assistance using LRAD  5. Gait  x 20 feet with Moderate Assistance using LRAD    6. Ascend/descend 5 stair with bilateral Handrails Moderate Assistance using Single-point Cane .   7. Lower extremity exercise program x20 reps per handout, with supervision      Outcome: Ongoing (interventions implemented as appropriate)  No goals met on today. Goals remain appropriate.    Candi Ram, PT, DPT  2018

## 2018-04-27 LAB
ANION GAP SERPL CALC-SCNC: 6 MMOL/L
BASOPHILS # BLD AUTO: 0 K/UL
BASOPHILS NFR BLD: 0 %
BUN SERPL-MCNC: 23 MG/DL
CALCIUM SERPL-MCNC: 7.9 MG/DL
CHLORIDE SERPL-SCNC: 113 MMOL/L
CO2 SERPL-SCNC: 25 MMOL/L
CREAT SERPL-MCNC: 0.7 MG/DL
DIFFERENTIAL METHOD: ABNORMAL
EOSINOPHIL # BLD AUTO: 0.3 K/UL
EOSINOPHIL NFR BLD: 2.7 %
ERYTHROCYTE [DISTWIDTH] IN BLOOD BY AUTOMATED COUNT: 18.6 %
EST. GFR  (AFRICAN AMERICAN): >60 ML/MIN/1.73 M^2
EST. GFR  (NON AFRICAN AMERICAN): >60 ML/MIN/1.73 M^2
GLUCOSE SERPL-MCNC: 79 MG/DL
HCT VFR BLD AUTO: 27.2 %
HGB BLD-MCNC: 8.1 G/DL
IMM GRANULOCYTES # BLD AUTO: 0.06 K/UL
IMM GRANULOCYTES NFR BLD AUTO: 0.5 %
LYMPHOCYTES # BLD AUTO: 0.9 K/UL
LYMPHOCYTES NFR BLD: 8 %
MCH RBC QN AUTO: 24.2 PG
MCHC RBC AUTO-ENTMCNC: 29.8 G/DL
MCV RBC AUTO: 81 FL
MONOCYTES # BLD AUTO: 1.4 K/UL
MONOCYTES NFR BLD: 11.8 %
NEUTROPHILS # BLD AUTO: 9 K/UL
NEUTROPHILS NFR BLD: 77 %
NRBC BLD-RTO: 0 /100 WBC
PLATELET # BLD AUTO: 146 K/UL
PMV BLD AUTO: 11.6 FL
POCT GLUCOSE: 81 MG/DL (ref 70–110)
POTASSIUM SERPL-SCNC: 3 MMOL/L
RBC # BLD AUTO: 3.35 M/UL
SODIUM SERPL-SCNC: 144 MMOL/L
WBC # BLD AUTO: 11.72 K/UL

## 2018-04-27 PROCEDURE — 25000003 PHARM REV CODE 250: Performed by: NURSE PRACTITIONER

## 2018-04-27 PROCEDURE — 63600175 PHARM REV CODE 636 W HCPCS: Performed by: NURSE PRACTITIONER

## 2018-04-27 PROCEDURE — 20600001 HC STEP DOWN PRIVATE ROOM

## 2018-04-27 PROCEDURE — 85025 COMPLETE CBC W/AUTO DIFF WBC: CPT

## 2018-04-27 PROCEDURE — 97535 SELF CARE MNGMENT TRAINING: CPT

## 2018-04-27 PROCEDURE — 36415 COLL VENOUS BLD VENIPUNCTURE: CPT

## 2018-04-27 PROCEDURE — 63600175 PHARM REV CODE 636 W HCPCS: Performed by: HOSPITALIST

## 2018-04-27 PROCEDURE — 80048 BASIC METABOLIC PNL TOTAL CA: CPT

## 2018-04-27 PROCEDURE — 94761 N-INVAS EAR/PLS OXIMETRY MLT: CPT

## 2018-04-27 PROCEDURE — 25000003 PHARM REV CODE 250: Performed by: CLINICAL NURSE SPECIALIST

## 2018-04-27 PROCEDURE — 92526 ORAL FUNCTION THERAPY: CPT

## 2018-04-27 PROCEDURE — 25000003 PHARM REV CODE 250: Performed by: HOSPITALIST

## 2018-04-27 PROCEDURE — 99233 SBSQ HOSP IP/OBS HIGH 50: CPT | Mod: GC,,, | Performed by: INTERNAL MEDICINE

## 2018-04-27 PROCEDURE — 25000003 PHARM REV CODE 250: Performed by: INTERNAL MEDICINE

## 2018-04-27 RX ORDER — SERTRALINE HYDROCHLORIDE 25 MG/1
25 TABLET, FILM COATED ORAL DAILY
Status: ON HOLD | COMMUNITY
End: 2018-05-01 | Stop reason: HOSPADM

## 2018-04-27 RX ORDER — AMLODIPINE BESYLATE 10 MG/1
10 TABLET ORAL DAILY
Status: DISCONTINUED | OUTPATIENT
Start: 2018-04-28 | End: 2018-05-01

## 2018-04-27 RX ORDER — PREDNISONE 20 MG/1
60 TABLET ORAL DAILY
Status: DISCONTINUED | OUTPATIENT
Start: 2018-04-28 | End: 2018-05-01

## 2018-04-27 RX ORDER — POTASSIUM CHLORIDE 20 MEQ/15ML
40 SOLUTION ORAL ONCE
Status: COMPLETED | OUTPATIENT
Start: 2018-04-27 | End: 2018-04-27

## 2018-04-27 RX ORDER — ACETAMINOPHEN 325 MG/1
650 TABLET ORAL EVERY 8 HOURS PRN
Status: DISCONTINUED | OUTPATIENT
Start: 2018-04-27 | End: 2018-05-01

## 2018-04-27 RX ORDER — PANTOPRAZOLE SODIUM 40 MG/1
40 FOR SUSPENSION ORAL DAILY
Status: DISCONTINUED | OUTPATIENT
Start: 2018-04-28 | End: 2018-05-01

## 2018-04-27 RX ORDER — CLOPIDOGREL BISULFATE 75 MG/1
75 TABLET ORAL NIGHTLY
Status: DISCONTINUED | OUTPATIENT
Start: 2018-04-27 | End: 2018-04-28

## 2018-04-27 RX ORDER — RAMELTEON 8 MG/1
8 TABLET ORAL NIGHTLY PRN
Status: DISCONTINUED | OUTPATIENT
Start: 2018-04-27 | End: 2018-05-01

## 2018-04-27 RX ORDER — SULFAMETHOXAZOLE AND TRIMETHOPRIM 200; 40 MG/5ML; MG/5ML
20 SUSPENSION ORAL
Status: DISCONTINUED | OUTPATIENT
Start: 2018-04-30 | End: 2018-05-01

## 2018-04-27 RX ORDER — DEXTROSE MONOHYDRATE 50 MG/ML
INJECTION, SOLUTION INTRAVENOUS CONTINUOUS
Status: ACTIVE | OUTPATIENT
Start: 2018-04-27 | End: 2018-04-27

## 2018-04-27 RX ADMIN — Medication 12.5 MG: at 09:04

## 2018-04-27 RX ADMIN — SULFAMETHOXAZOLE AND TRIMETHOPRIM 20 ML: 200; 40 SUSPENSION ORAL at 09:04

## 2018-04-27 RX ADMIN — POTASSIUM CHLORIDE 40 MEQ: 20 SOLUTION ORAL at 06:04

## 2018-04-27 RX ADMIN — CLOPIDOGREL 75 MG: 75 TABLET, FILM COATED ORAL at 09:04

## 2018-04-27 RX ADMIN — PANTOPRAZOLE SODIUM 40 MG: 40 GRANULE, DELAYED RELEASE ORAL at 09:04

## 2018-04-27 RX ADMIN — ENOXAPARIN SODIUM 40 MG: 100 INJECTION SUBCUTANEOUS at 06:04

## 2018-04-27 RX ADMIN — AMLODIPINE BESYLATE 5 MG: 5 TABLET ORAL at 09:04

## 2018-04-27 RX ADMIN — PREDNISONE 60 MG: 20 TABLET ORAL at 09:04

## 2018-04-27 NOTE — NURSING
Pt AOx4(intermittent confusion and hostility). Pt refused Vs and all accuchecks. Pt calm with daughter in room, but refused all accuchecks and vital signs despite their presence. Constant reassurance given.. NGT placed with much encouragement  From daughters. Pump at 10ml/hr and progressing hourly.

## 2018-04-27 NOTE — SUBJECTIVE & OBJECTIVE
Interval History: No acute events overnight, patient continues to fail swallow studies. Will place NG tube today for nutrition and consult placed for general surgery for PEG placement.     Review of Systems   Constitutional: Negative for chills, fever and unexpected weight change.   HENT: Negative for hearing loss.    Eyes: Negative for visual disturbance.   Respiratory: Negative for shortness of breath and wheezing.    Cardiovascular: Positive for leg swelling. Negative for chest pain.   Gastrointestinal: Negative for abdominal pain, constipation, diarrhea, nausea and vomiting.   Genitourinary: Negative for dysuria.   Musculoskeletal: Negative for arthralgias.   Skin: Negative for rash.   Neurological: Negative for seizures.   Hematological: Negative for adenopathy. Does not bruise/bleed easily.   Psychiatric/Behavioral: Negative for agitation, behavioral problems and confusion.     Objective:     Vital Signs (Most Recent):  Temp: 97 °F (36.1 °C) (04/27/18 0503)  Pulse: 66 (04/27/18 0503)  Resp: (!) 22 (04/27/18 0503)  BP: (!) 167/70 (04/27/18 0503)  SpO2: (!) 93 % (04/27/18 0503) Vital Signs (24h Range):  Temp:  [97 °F (36.1 °C)-98.7 °F (37.1 °C)] 97 °F (36.1 °C)  Pulse:  [61-68] 66  Resp:  [16-22] 22  SpO2:  [90 %-99 %] 93 %  BP: (123-182)/(60-85) 167/70     Weight: 71.5 kg (157 lb 10.1 oz)  Body mass index is 26.23 kg/m².    Intake/Output Summary (Last 24 hours) at 04/27/18 1123  Last data filed at 04/27/18 0600   Gross per 24 hour   Intake                0 ml   Output              300 ml   Net             -300 ml      Physical Exam   Constitutional: She is oriented to person, place, and time. She appears well-developed and well-nourished.   HENT:   Head: Normocephalic and atraumatic.   Eyes: EOM are normal. Pupils are equal, round, and reactive to light.   Neck: No tracheal deviation present. No thyromegaly present.   Cardiovascular: Normal rate.    No murmur heard.  Pulmonary/Chest: Effort normal. She has no  wheezes. She has no rales.   Abdominal: Bowel sounds are normal. There is no tenderness.   Musculoskeletal: She exhibits edema (BLLE 1+). She exhibits no deformity.   Neurological: She is alert and oriented to person, place, and time. She exhibits normal muscle tone.   Skin: No rash noted. No erythema.   Vitals reviewed.      Significant Labs:   CBC:   Recent Labs  Lab 04/27/18  0423   WBC 11.72   HGB 8.1*   HCT 27.2*   *     CMP:   Recent Labs  Lab 04/26/18  0136 04/26/18  0830 04/27/18  0423   * 144 144   K 4.3 3.6 3.0*   * 114* 113*   CO2 23 21* 25   GLU 92 88 79   BUN 35* 31* 23   CREATININE 0.8 0.7 0.7   CALCIUM 8.0* 7.8* 7.9*   ANIONGAP 8 9 6*   EGFRNONAA >60.0 >60.0 >60.0       Significant Imaging: I have reviewed all pertinent imaging results/findings within the past 24 hours.

## 2018-04-27 NOTE — PLAN OF CARE
Problem: SLP Goal  Goal: SLP Goal  Speech Therapy Short Term Goals  Goal expected to be met by 5/2  1. Patient will complete dysphagia therapy exercises x10 with min cues.    Speech Language Pathology Goals  Goals expected to be met by 4/30 1. Patient will participate in ongoing swallow assessment       Outcome: Ongoing (interventions implemented as appropriate)  Recommend ongoing NPO.     XUAN Evans, CCC-SLP  [phone]  4/27/2018

## 2018-04-27 NOTE — ASSESSMENT & PLAN NOTE
--Suspect secondary to severe bilateral pneumonia in the setting of immunosuppression; possibly  vs reaction from Humira   --Severely worsened over first week of admission despite CAP treatment.  --s/p emergent intubation on 4/11 upon MICU admission for severe hypoxemia and severe respiratory distress.  --Bronchoscopy on 4/11 with wash; culture with few WBCs, no organisms.  --Antibiotics broadened to vancomycin, zosyn, bactrim, and posaconazole initally; stopped now  --Blood, sputum, and urine cultures negative from 4/10 and 4/18  --KOH prep negative, AFB stain with no acid fast bacilli noted, fungal culture negative  --Aspergillus, Histoplasma, and Cryptococcal antigen in blood is negative. Legionella and blasomyces negative.   --Extubated on 4/22  - PO pred 60 qd for possibility of , duo nebs PRN for wheezing  - On Bactrim for PJP PPx currently  - Pt currently on 3L NC and sating well, with slightly decreased lung sounds in BLLL  - stepped down to hospital medicine 04/25/18  - on NC at 2 L  - monitor   - incentive spirometry

## 2018-04-27 NOTE — ASSESSMENT & PLAN NOTE
- likely 2/2 prolonged intubation - 11 days  - SLP following  - failed MBSS 4/25  - plan for NG placement  - surgical consultation for PEG placement

## 2018-04-27 NOTE — ASSESSMENT & PLAN NOTE
- likely 2/2 prolonged intubation - 11 days  - SLP following  - failed MBSS 4/25  - TESSY tube placed on 4/26  - start ngt hydration, meds and TFs

## 2018-04-27 NOTE — ASSESSMENT & PLAN NOTE
--Na+ peak at 152, slowly impoved with D5W  - 2/2 volume depletion 2/2 decreased PO intake  - on D5 gtt at 75 mL/hr   - start enteral water boluses once NGT/ TESSY tube in place

## 2018-04-27 NOTE — PT/OT/SLP PROGRESS
"Speech Language Pathology Treatment    Patient Name:  Selma Alonzo Lux MD   MRN:  [id number]   1012/1012 A    Admitting Diagnosis: Pneumonia of both lungs due to infectious organism    Recommendations:                 General Recommendations:  Dysphagia therapy  Diet recommendations:  NPO, Liquid Diet Level: NPO   Aspiration Precautions: Strict aspiration precautions   General Precautions: Standard, aspiration, NPO, fall  Communication strategies:  go to room if call light pushed    Subjective     "I don't want to do them!" Pt re: dysphagia exercises.     Pain/Comfort:  · Pain Rating 1: 0/10  · Pain Rating Post-Intervention 1: 0/10    Objective:     Has the patient been evaluated by SLP for swallowing?   Yes  Keep patient NPO? Yes   Current Respiratory Status: nasal cannula      Pt awake upon entry. However resting with eyes closed. Pt's 2 daughters at bedside. Upon clinician arrival, pt's 2 daughters initially appeared frustrated with SLP recommendation for NPO per results of MBSS completed 4/25/18. Extensive education provided re: results of MBSS and swallowing anatomy, definition/ risks/ overt clinical signs of aspiration, and definition of silent aspiration. Additional education provided re: obligation of SLP to recommend the safest, least restrictive diet. However, pt has the right to ultimately do as she thinks best. Pt and daughters verbalized appreciation of all education provided, understanding of SLP recommendations, and preference for pt to remain NPO with alternate means of nutrition, hydration, medication at this time. Although patient initially refused to participate in dysphagia exercises, given emotional support from her daughters, she eventually cooperated and completed the following exercises x5 each given near consistent modeling and verbal and tactile cueing: effortful swallow, supraglottic, falsetto /i/, and BOT /k/ and /g/. Martha exercise attempted. However despite appearance of max effort, " ability to maintain protruded lingual position during swallow unappreciated. Additional education provided re: ongoing SLP POC and independent completion of dysphagia exercises 5-10x/ each, 2-3x/ day. Handout re: instructions for independent completion of dysphagia exercises left with daughter at bedside. Upon termination of session, pt's daughters inquiring re: expectations as to when pt may resume PO and how discharge plans are decided. All questions answered and emotional support provided. Pt and daughters verbalized understanding of all education provided and agreement with SLP POC. No further questions.     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     Goals:    SLP Goals        Problem: SLP Goal    Goal Priority Disciplines Outcome   SLP Goal     SLP Ongoing (interventions implemented as appropriate)   Description:  Speech Therapy Short Term Goals  Goal expected to be met by 5/2  1. Patient will complete dysphagia therapy exercises x10 with min cues.    Speech Language Pathology Goals  Goals expected to be met by 4/30  1. Patient will participate in ongoing swallow assessment                        Plan:     · Patient to be seen:  5 x/week   · Plan of Care expires:  05/23/18  · Plan of Care reviewed with:  patient, daughter   · SLP Follow-Up:  Yes       Discharge recommendations:  nursing facility, skilled     Time Tracking:     SLP Treatment Date:   04/27/18  Speech Start Time:  1202  Speech Stop Time:  1235     Speech Total Time (min):  33 min    Billable Minutes: Treatment Swallowing Dysfunction 23 and Seld Care/Home Management Training 10    XUAN Evans, CCC-SLP  [phone]  4/27/2018

## 2018-04-27 NOTE — PLAN OF CARE
Problem: Patient Care Overview  Goal: Plan of Care Review  Outcome: Ongoing (interventions implemented as appropriate)  PT is AAOX3, no acute changes noted during shift, pt is on bedrest and repositioned q2, no skin breakdown noted, tele monitoring maintained until d/c,VSS  Blood glucose monitoring maintained, HOB remained elevated>30 during shift, No falls noted. Fall precautions remain Pain assessed. No pain noted. Pt resting comfortable in bed. Call light in reach. Will continue to monitor.

## 2018-04-27 NOTE — ASSESSMENT & PLAN NOTE
--Appreciate Rheumatology assistance.  --Continue hydroxychloroquine- had to be stopped at unable to crush  -- switched to prednisone 60mg PO QD.

## 2018-04-27 NOTE — PROGRESS NOTES
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 She was seen by her PCP on 4/2 for these complaints and received a prescription for duo nebs and tessalon perles. CXR performed on 4/2 noted bilateral basilar infiltrates. At that time, she denied fever, chills, sputum production, sick contacts. She was admitted to  and treated emprically for CAP with rocephin and azithromycin. She was also diuresed daily. However, her oxygen requirements slowly increased from 2 L NC to 4 L NC with also progressively worsening bilateral infiltrates on imaging. CTA performed on 4/9 negative for PE, however noted significant bilateral consolidations. On 4/11, her hypoxemia continued to worsen, requiring NRB mask, and she developed severe respiratory distress. She was transferred to the MICU and emergently intubated. Repeat CT chest performed on 4/11 demonstrated progressive worsening of bilateral peripheral infiltrates.    Hospital Course:  Admitted to MICU on 4/11, intubated emergently for severe hypoxemia and respiratory distress. ID was consulted for assistance in management given immunocompromised state. Patient paralyzed with nimbex following persistent dysynchrony with vent despite sedation. Abx broadened to vancomycin, zosyn,  bactrim, and posaconazole. Norepinephrine initiated for BP support. Bronchoscopy performed at bedside on 4/11 and all cultures including fungal cultures negative. Nimbex discontinued on 4/12. She continued to require significant oxygen support thereafter, and was unable to wean oxygen requirements significantly since intubation. However, she has made slow improvements in her oxygenation since 4/18. She went into A-fib RVR in 120-160's on the evening of 4/17, and was briefly started on amiodarone gtt; this discontinued and metoprolol added with good rate control on 4/18. SAT/SBT continue daily with difficulty weaning her off fentanyl as she gets agitated tachycardic and hypertensive ('s). Valium initiated in effort to wean fentanyl off. Patient extubated on 4/22 to nasal cannula  4/24/2018 - Pt with chills/cold overnight & refusing meds, willing to take meds this morning. Hill placed this AM with good UOP. Pt still NPO per swallowing trial. Free water deficit 2.8L, starting with 500mL D5, will reassess, Pt oriented x3 this morning. Re-evaluate swallowing.  04/25/2018: Stepped down to hospital medicine. No acute events overnight, patient to have swallow evaluation today. Respiratory status stable, on 2L NC.   04/27/2018: No acute events overnight, patient continues to fail swallow studies. Will place NG tube today for nutrition and consult placed for general surgery for PEG placement.     Interval History: No acute events overnight, patient continues to fail swallow studies. Will place NG tube today for nutrition and consult placed for general surgery for PEG placement.     Review of Systems   Constitutional: Negative for chills, fever and unexpected weight change.   HENT: Negative for hearing loss.    Eyes: Negative for visual disturbance.   Respiratory: Negative for shortness of breath and wheezing.    Cardiovascular: Positive for leg swelling. Negative for chest pain.   Gastrointestinal: Negative for  abdominal pain, constipation, diarrhea, nausea and vomiting.   Genitourinary: Negative for dysuria.   Musculoskeletal: Negative for arthralgias.   Skin: Negative for rash.   Neurological: Negative for seizures.   Hematological: Negative for adenopathy. Does not bruise/bleed easily.   Psychiatric/Behavioral: Negative for agitation, behavioral problems and confusion.     Objective:     Vital Signs (Most Recent):  Temp: 97 °F (36.1 °C) (04/27/18 0503)  Pulse: 66 (04/27/18 0503)  Resp: (!) 22 (04/27/18 0503)  BP: (!) 167/70 (04/27/18 0503)  SpO2: (!) 93 % (04/27/18 0503) Vital Signs (24h Range):  Temp:  [97 °F (36.1 °C)-98.7 °F (37.1 °C)] 97 °F (36.1 °C)  Pulse:  [61-68] 66  Resp:  [16-22] 22  SpO2:  [90 %-99 %] 93 %  BP: (123-182)/(60-85) 167/70     Weight: 71.5 kg (157 lb 10.1 oz)  Body mass index is 26.23 kg/m².    Intake/Output Summary (Last 24 hours) at 04/27/18 1123  Last data filed at 04/27/18 0600   Gross per 24 hour   Intake                0 ml   Output              300 ml   Net             -300 ml      Physical Exam   Constitutional: She is oriented to person, place, and time. She appears well-developed and well-nourished.   HENT:   Head: Normocephalic and atraumatic.   Eyes: EOM are normal. Pupils are equal, round, and reactive to light.   Neck: No tracheal deviation present. No thyromegaly present.   Cardiovascular: Normal rate.    No murmur heard.  Pulmonary/Chest: Effort normal. She has no wheezes. She has no rales.   Abdominal: Bowel sounds are normal. There is no tenderness.   Musculoskeletal: She exhibits edema (BLLE 1+). She exhibits no deformity.   Neurological: She is alert and oriented to person, place, and time. She exhibits normal muscle tone.   Skin: No rash noted. No erythema.   Vitals reviewed.      Significant Labs:   CBC:   Recent Labs  Lab 04/27/18  0423   WBC 11.72   HGB 8.1*   HCT 27.2*   *     CMP:   Recent Labs  Lab 04/26/18  0136 04/26/18  0830 04/27/18  0423   * 144 144    K 4.3 3.6 3.0*   * 114* 113*   CO2 23 21* 25   GLU 92 88 79   BUN 35* 31* 23   CREATININE 0.8 0.7 0.7   CALCIUM 8.0* 7.8* 7.9*   ANIONGAP 8 9 6*   EGFRNONAA >60.0 >60.0 >60.0       Significant Imaging: I have reviewed all pertinent imaging results/findings within the past 24 hours.    Assessment/Plan:      * Pneumonia of both lungs due to infectious organism     --see above  -- CXR 4/25- no significant change          Oropharyngeal dysphagia    - likely 2/2 prolonged intubation - 11 days  - SLP following  - failed MBSS 4/25  - plan for NG placement  - surgical consultation for PEG placement         Hypernatremia    --Na+ peak at 152, slowly impoved with D5W  - 2/2 volume depletion 2/2 decreased PO intake  - on D5 gtt at 75 mL/hr   - start enteral water boluses once NGT/ TESSY tube in place          Acute hypoxemic respiratory failure    --Suspect secondary to severe bilateral pneumonia in the setting of immunosuppression; possibly  vs reaction from Humira   --Severely worsened over first week of admission despite CAP treatment.  --s/p emergent intubation on 4/11 upon MICU admission for severe hypoxemia and severe respiratory distress.  --Bronchoscopy on 4/11 with wash; culture with few WBCs, no organisms.  --Antibiotics broadened to vancomycin, zosyn, bactrim, and posaconazole initally; stopped now  --Blood, sputum, and urine cultures negative from 4/10 and 4/18  --KOH prep negative, AFB stain with no acid fast bacilli noted, fungal culture negative  --Aspergillus, Histoplasma, and Cryptococcal antigen in blood is negative. Legionella and blasomyces negative.   --Extubated on 4/22  - PO pred 60 qd for possibility of , duo nebs PRN for wheezing  - On Bactrim for PJP PPx currently  - Pt currently on 3L NC and sating well, with slightly decreased lung sounds in BLLL  - stepped down to hospital medicine 04/25/18  - on NC at 2 L  - monitor   - incentive spirometry           History of stroke    --Remote  lacunar infarcts in the right centrum semi-ovale, basal ganglia, and right tatyana per CTH w/o contrast on 12/2017.  --Continue home clopidogrel.  -- PT/OT          Vascular dementia    - Chronic and stable  - Continue Plavix 75mg PO daily             Long term (current) use of systemic steroids    --see above          Hypertension, essential    - pt asymptomatic with essential HTN, on amlodipine 5,  Metoprolol 12.5 bid        Seropositive rheumatoid arthritis of multiple sites    --Appreciate Rheumatology assistance.  --Continue hydroxychloroquine- had to be stopped at unable to crush  -- switched to prednisone 60mg PO QD.            PUD (peptic ulcer disease)    --Continue PPI          Iron deficiency anemia secondary to inadequate dietary iron intake                VTE Risk Mitigation         Ordered     enoxaparin injection 40 mg  Daily      04/25/18 3219              Rashad Ramsey MD  Department of Hospital Medicine   [facility]

## 2018-04-27 NOTE — PROGRESS NOTES
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 She was seen by her PCP on 4/2 for these complaints and received a prescription for duo nebs and tessalon perles. CXR performed on 4/2 noted bilateral basilar infiltrates. At that time, she denied fever, chills, sputum production, sick contacts. She was admitted to  and treated emprically for CAP with rocephin and azithromycin. She was also diuresed daily. However, her oxygen requirements slowly increased from 2 L NC to 4 L NC with also progressively worsening bilateral infiltrates on imaging. CTA performed on 4/9 negative for PE, however noted significant bilateral consolidations. On 4/11, her hypoxemia continued to worsen, requiring NRB mask, and she developed severe respiratory distress. She was transferred to the MICU and emergently intubated. Repeat CT chest performed on 4/11 demonstrated progressive worsening of bilateral peripheral infiltrates.    Hospital Course:  Admitted to MICU on 4/11, intubated emergently for severe hypoxemia and respiratory distress. ID was consulted for assistance in management given immunocompromised state. Patient paralyzed with nimbex following persistent dysynchrony with vent despite sedation. Abx broadened to vancomycin, zosyn, bactrim,  and posaconazole. Norepinephrine initiated for BP support. Bronchoscopy performed at bedside on 4/11 and all cultures including fungal cultures negative. Nimbex discontinued on 4/12. She continued to require significant oxygen support thereafter, and was unable to wean oxygen requirements significantly since intubation. However, she has made slow improvements in her oxygenation since 4/18. She went into A-fib RVR in 120-160's on the evening of 4/17, and was briefly started on amiodarone gtt; this discontinued and metoprolol added with good rate control on 4/18. SAT/SBT continue daily with difficulty weaning her off fentanyl as she gets agitated tachycardic and hypertensive ('s). Valium initiated in effort to wean fentanyl off. Patient extubated on 4/22 to nasal cannula  4/24/2018 - Pt with chills/cold overnight & refusing meds, willing to take meds this morning. Hill placed this AM with good UOP. Pt still NPO per swallowing trial. Free water deficit 2.8L, starting with 500mL D5, will reassess, Pt oriented x3 this morning. Re-evaluate swallowing.  04/25/2018: Stepped down to hospital medicine. No acute events overnight, patient to have swallow evaluation today. Respiratory status stable, on 2L NC.     Interval History:     Did not pass MBSS, unable to take PO, takes her meds. No fevers. Na+ improved with D5W gtt, Na+ now 146 from 150  PT/OT/ SLP. Plan for TESSY tube placement today     amLODIPine  5 mg Oral Daily    clopidogrel  75 mg Oral QHS    enoxaparin  40 mg Subcutaneous Daily    metoprolol  12.5 mg Oral BID    pantoprazole  40 mg Oral Daily    predniSONE  60 mg Oral Daily    [START ON 4/27/2018] sulfamethoxazole-trimethoprim 200-40 mg/5 ml  20 mL Oral Every Mon, Wed, Fri    tuberculin  5 Units Intradermal Once      dextrose 5 % 75 mL/hr at 04/26/18 0910         Review of Systems   Constitutional: Negative for chills, fever and unexpected weight change.   HENT: Positive for trouble swallowing.  Negative for hearing loss.    Eyes: Negative for visual disturbance.   Respiratory: Negative for shortness of breath and wheezing.    Cardiovascular: Positive for leg swelling. Negative for chest pain.   Gastrointestinal: Negative for abdominal pain, constipation, diarrhea, nausea and vomiting.   Genitourinary: Negative for difficulty urinating and dysuria.   Musculoskeletal: Negative for arthralgias.   Skin: Negative for rash.   Allergic/Immunologic: Positive for immunocompromised state.   Neurological: Positive for weakness. Negative for dizziness and light-headedness.        Diffusely weak   Hematological: Negative for adenopathy. Does not bruise/bleed easily.   Psychiatric/Behavioral: Negative for confusion.     Objective:     Vital Signs (Most Recent):  Temp: 98.7 °F (37.1 °C) (04/26/18 2020)  Pulse: 68 (04/26/18 2020)  Resp: 18 (04/26/18 2020)  BP: (!) 152/68 (04/26/18 2020)  SpO2: 95 % (04/26/18 2020) Vital Signs (24h Range):  Temp:  [97.6 °F (36.4 °C)-98.7 °F (37.1 °C)] 98.7 °F (37.1 °C)  Pulse:  [60-68] 68  Resp:  [16-18] 18  SpO2:  [95 %-99 %] 95 %  BP: (123-152)/(60-85) 152/68     Weight: 71.5 kg (157 lb 10.1 oz)  Body mass index is 26.23 kg/m².    Intake/Output Summary (Last 24 hours) at 04/26/18 2113  Last data filed at 04/25/18 2356   Gross per 24 hour   Intake                0 ml   Output              500 ml   Net             -500 ml      Physical Exam   Constitutional: She is oriented to person, place, and time. She appears well-developed and well-nourished.   HENT:   Head: Normocephalic and atraumatic.   Eyes: EOM are normal. Pupils are equal, round, and reactive to light.   Neck: No tracheal deviation present. No thyromegaly present.   Cardiovascular: Normal rate.    No murmur heard.  Pulmonary/Chest: Effort normal. She has no wheezes. She has no rales.   Abdominal: Bowel sounds are normal. There is no tenderness.   Musculoskeletal: She exhibits edema (BLLE 1+). She exhibits no deformity.    anasarca    Neurological: She is alert and oriented to person, place, and time. No cranial nerve deficit.   Diffusely weakn   Skin: No rash noted. No erythema. There is pallor.   Vitals reviewed.      Significant Labs:   CBC:   Recent Labs  Lab 04/25/18  0832   WBC 18.79*   HGB 8.7*   HCT 29.6*        CMP:   Recent Labs  Lab 04/25/18  0832  04/25/18  1940 04/26/18  0136 04/26/18  0830   *  < > 147* 146* 144   K 3.5  < > 4.4 4.3 3.6   *  < > 116* 115* 114*   CO2 27  < > 25 23 21*     < > 174* 92 88   BUN 39*  < > 39* 35* 31*   CREATININE 0.8  < > 0.9 0.8 0.7   CALCIUM 8.4*  < > 8.3* 8.0* 7.8*   PROT 5.0*  --   --   --   --    ALBUMIN 1.9*  --   --   --   --    BILITOT 0.6  --   --   --   --    ALKPHOS 58  --   --   --   --    AST 17  --   --   --   --    ALT 20  --   --   --   --    ANIONGAP 5*  < > 6* 8 9   EGFRNONAA >60.0  < > >60.0 >60.0 >60.0   < > = values in this interval not displayed.        Assessment/Plan:      * Pneumonia of both lungs due to infectious organism     --see above  -- CXR 4/25- no significant change          Acute hypoxemic respiratory failure    --Suspect secondary to severe bilateral pneumonia in the setting of immunosuppression; possibly  vs reaction from Humira   --Severely worsened over first week of admission despite CAP treatment.  --s/p emergent intubation on 4/11 upon MICU admission for severe hypoxemia and severe respiratory distress.  --Bronchoscopy on 4/11 with wash; culture with few WBCs, no organisms.  --Antibiotics broadened to vancomycin, zosyn, bactrim, and posaconazole initally; stopped now  --Blood, sputum, and urine cultures negative from 4/10 and 4/18  --KOH prep negative, AFB stain with no acid fast bacilli noted, fungal culture negative  --Aspergillus, Histoplasma, and Cryptococcal antigen in blood is negative. Legionella and blasomyces negative.   --Extubated on 4/22  - PO pred 60 qd for possibility of , duo nebs PRN for wheezing  - On  Bactrim for PJP PPx currently  - Pt currently on 3L NC and sating well, with slightly decreased lung sounds in BLLL  - stepped down to hospital medicine 04/25/18  - on NC at 2 L  - monitor   - incentive spirometry           Oropharyngeal dysphagia    - likely 2/2 prolonged intubation - 11 days  - SLP following  - failed MBSS 4/25  - TESSY tube placed on 4/26  - start ngt hydration, meds and TFs        Hypernatremia    --Na+ peak at 152, slowly impoved with D5W to 146 today  - 2/2 volume depletion 2/2 decreased PO intake  - on D5 gtt at 75 mL/hr   - start enteral water boluses once NGT/ TESSY tube in place          History of stroke    --Remote lacunar infarcts in the right centrum semi-ovale, basal ganglia, and right tatyana per CTH w/o contrast on 12/2017.  --Continue home clopidogrel.  -- PT/OT          Vascular dementia    - Chronic and stable  - Continue Plavix 75mg PO daily             Long term (current) use of systemic steroids    --see above          Hypertension, essential    - pt asymptomatic with essential HTN, on amlodipine 5,  Metoprolol 12.5 bid        Seropositive rheumatoid arthritis of multiple sites    --Appreciate Rheumatology assistance.  --Continue hydroxychloroquine- had to be stopped at unable to crush  -- switched to prednisone 60mg PO QD.            PUD (peptic ulcer disease)    --Continue PPI          Iron deficiency anemia secondary to inadequate dietary iron intake                VTE Risk Mitigation         Ordered     enoxaparin injection 40 mg  Daily      04/25/18 2777          SNF consulted    Nica Luo MD  Department of Hospital Medicine   [facility]

## 2018-04-27 NOTE — CONSULTS
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    Hypertension     IBS (irritable bowel syndrome) 6/21/2017    IGT (impaired glucose tolerance) 1/12/2016    Iron deficiency anemia secondary to inadequate dietary iron intake 6/24/2013    Joint pain     Keratoconjunctivitis sicca of both eyes not specified as Sjogren's 7/29/2016    Leiomyoma of uterus, unspecified 9/16/2014    Long QT interval 6/29/2016    Due to medication (plaquenil)     Macular edema 1/12/2015    Multinodular goiter 1/12/2016    Neuropathy 8/23/2017    Plaquenil causing adverse effect in therapeutic use 10/7/2016    Pneumococcal vaccine refused 8/17/2016    Pneumonia due to Streptococcus pneumoniae 4/5/2018    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/2015    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    s/p Right Total knee replacement 5/15/2013    Scoliosis of thoracic spine 5/15/2013    Small vessel disease, cerebrovascular 12/28/2017    Stroke     Vascular dementia 12/6/2017     Past Surgical History:   Procedure Laterality Date    CATARACT EXTRACTION      COLONOSCOPY N/A 9/29/2015    Procedure: COLONOSCOPY;  Surgeon: FIDELINA Sanchez MD;  Location: [facility] ([address] [place] [address]);  Service: Endoscopy;  Laterality: N/A;    EYE SURGERY      JOINT REPLACEMENT      right knee    KNEE SURGERY Left 12/31/2013    TKR    left  parotidectomy      mixed tumor    SALIVARY GLAND SURGERY      TONSILLECTOMY         Imaging reviewed with Radiology staff, Dr. Woodruff.     Procedure: G tube placement    Scheduled Meds:    amLODIPine  5 mg Oral Daily    clopidogrel  75 mg Oral QHS    enoxaparin  40 mg Subcutaneous Daily    metoprolol  12.5 mg Oral BID    pantoprazole  40 mg Oral Daily    predniSONE  60 mg Oral Daily    sulfamethoxazole-trimethoprim 200-40 mg/5 ml  20 mL Oral Every Mon, Wed, Fri    tuberculin  5 Units Intradermal Once     Continuous Infusions:    dextrose 5 % 75 mL/hr at 04/26/18 0910     PRN Meds:acetaminophen, albuterol-ipratropium 2.5mg-0.5mg/3mL, dextrose 50%, glucagon (human recombinant), insulin aspart U-100, ramelteon, sodium chloride 0.9%    Allergies: Review of patient's allergies indicates:  No Known Allergies    Labs:    Recent Labs  Lab 04/25/18 0832   INR 1.2       Recent Labs  Lab 04/27/18 0423   WBC 11.72   HGB 8.1*   HCT 27.2*   MCV 81*   *      Recent Labs  Lab 04/25/18  0832  04/27/18 0423     < > 79   *  < > 144   K 3.5  < > 3.0*   *  < > 113*   CO2 27  < > 25   BUN 39*  < > 23   CREATININE 0.8  < > 0.7   CALCIUM 8.4*  < > 7.9*   MG 2.0  --   --    ALT 20  --   --    AST 17  --   --    ALBUMIN 1.9*  --   --    BILITOT 0.6  --   --    BILIDIR 0.4*  --   --    < > = values in this interval not displayed.      Vitals (Most Recent):  Temp: 97 °F (36.1 °C) (04/27/18 0503)  Pulse: 66 (04/27/18 0503)  Resp: (!) 22 (04/27/18 0503)  BP: (!) 167/70 (04/27/18 0503)  SpO2: (!) 93 % (04/27/18 0503)    Plan:   1. NPO after midnight Sunday night.  2. Hold anticoagulants.  3. Pt scheduled for G tube placement for monday.  4. Will order barium contrast for G tube placement.    Shady Reyes MD  Radiology

## 2018-04-27 NOTE — SUBJECTIVE & OBJECTIVE
Interval History:     Did not pass MBSS, unable to take PO, takes her meds. No fevers. Na+ improved with D5W gtt, Na+ now 146 from 150  PT/OT/ SLP. Plan for TESSY tube placement today     amLODIPine  5 mg Oral Daily    clopidogrel  75 mg Oral QHS    enoxaparin  40 mg Subcutaneous Daily    metoprolol  12.5 mg Oral BID    pantoprazole  40 mg Oral Daily    predniSONE  60 mg Oral Daily    [START ON 4/27/2018] sulfamethoxazole-trimethoprim 200-40 mg/5 ml  20 mL Oral Every Mon, Wed, Fri    tuberculin  5 Units Intradermal Once      dextrose 5 % 75 mL/hr at 04/26/18 0910         Review of Systems   Constitutional: Negative for chills, fever and unexpected weight change.   HENT: Positive for trouble swallowing. Negative for hearing loss.    Eyes: Negative for visual disturbance.   Respiratory: Negative for shortness of breath and wheezing.    Cardiovascular: Positive for leg swelling. Negative for chest pain.   Gastrointestinal: Negative for abdominal pain, constipation, diarrhea, nausea and vomiting.   Genitourinary: Negative for difficulty urinating and dysuria.   Musculoskeletal: Negative for arthralgias.   Skin: Negative for rash.   Allergic/Immunologic: Positive for immunocompromised state.   Neurological: Positive for weakness. Negative for dizziness and light-headedness.        Diffusely weak   Hematological: Negative for adenopathy. Does not bruise/bleed easily.   Psychiatric/Behavioral: Negative for confusion.     Objective:     Vital Signs (Most Recent):  Temp: 98.7 °F (37.1 °C) (04/26/18 2020)  Pulse: 68 (04/26/18 2020)  Resp: 18 (04/26/18 2020)  BP: (!) 152/68 (04/26/18 2020)  SpO2: 95 % (04/26/18 2020) Vital Signs (24h Range):  Temp:  [97.6 °F (36.4 °C)-98.7 °F (37.1 °C)] 98.7 °F (37.1 °C)  Pulse:  [60-68] 68  Resp:  [16-18] 18  SpO2:  [95 %-99 %] 95 %  BP: (123-152)/(60-85) 152/68     Weight: 71.5 kg (157 lb 10.1 oz)  Body mass index is 26.23 kg/m².    Intake/Output Summary (Last 24 hours) at 04/26/18  2113  Last data filed at 04/25/18 2356   Gross per 24 hour   Intake                0 ml   Output              500 ml   Net             -500 ml      Physical Exam   Constitutional: She is oriented to person, place, and time. She appears well-developed and well-nourished.   HENT:   Head: Normocephalic and atraumatic.   Eyes: EOM are normal. Pupils are equal, round, and reactive to light.   Neck: No tracheal deviation present. No thyromegaly present.   Cardiovascular: Normal rate.    No murmur heard.  Pulmonary/Chest: Effort normal. She has no wheezes. She has no rales.   Abdominal: Bowel sounds are normal. There is no tenderness.   Musculoskeletal: She exhibits edema (BLLE 1+). She exhibits no deformity.   anasarca    Neurological: She is alert and oriented to person, place, and time. No cranial nerve deficit.   Diffusely weakn   Skin: No rash noted. No erythema. There is pallor.   Vitals reviewed.      Significant Labs:   CBC:   Recent Labs  Lab 04/25/18  0832   WBC 18.79*   HGB 8.7*   HCT 29.6*        CMP:   Recent Labs  Lab 04/25/18  0832  04/25/18  1940 04/26/18  0136 04/26/18  0830   *  < > 147* 146* 144   K 3.5  < > 4.4 4.3 3.6   *  < > 116* 115* 114*   CO2 27  < > 25 23 21*     < > 174* 92 88   BUN 39*  < > 39* 35* 31*   CREATININE 0.8  < > 0.9 0.8 0.7   CALCIUM 8.4*  < > 8.3* 8.0* 7.8*   PROT 5.0*  --   --   --   --    ALBUMIN 1.9*  --   --   --   --    BILITOT 0.6  --   --   --   --    ALKPHOS 58  --   --   --   --    AST 17  --   --   --   --    ALT 20  --   --   --   --    ANIONGAP 5*  < > 6* 8 9   EGFRNONAA >60.0  < > >60.0 >60.0 >60.0   < > = values in this interval not displayed.

## 2018-04-27 NOTE — ASSESSMENT & PLAN NOTE
--Na+ peak at 152, slowly impoved with D5W to 146 today  - 2/2 volume depletion 2/2 decreased PO intake  - on D5 gtt at 75 mL/hr   - start enteral water boluses once NGT/ TESSY tube in place

## 2018-04-28 PROBLEM — E87.0 HYPERNATREMIA: Status: RESOLVED | Noted: 2018-04-18 | Resolved: 2018-04-28

## 2018-04-28 LAB
ANION GAP SERPL CALC-SCNC: 9 MMOL/L
BUN SERPL-MCNC: 18 MG/DL
CALCIUM SERPL-MCNC: 7.5 MG/DL
CHLORIDE SERPL-SCNC: 111 MMOL/L
CO2 SERPL-SCNC: 20 MMOL/L
CREAT SERPL-MCNC: 0.7 MG/DL
EST. GFR  (AFRICAN AMERICAN): >60 ML/MIN/1.73 M^2
EST. GFR  (NON AFRICAN AMERICAN): >60 ML/MIN/1.73 M^2
GLUCOSE SERPL-MCNC: 73 MG/DL
POCT GLUCOSE: 137 MG/DL (ref 70–110)
POTASSIUM SERPL-SCNC: 3.2 MMOL/L
SODIUM SERPL-SCNC: 140 MMOL/L

## 2018-04-28 PROCEDURE — 25000003 PHARM REV CODE 250: Performed by: STUDENT IN AN ORGANIZED HEALTH CARE EDUCATION/TRAINING PROGRAM

## 2018-04-28 PROCEDURE — 36415 COLL VENOUS BLD VENIPUNCTURE: CPT

## 2018-04-28 PROCEDURE — 80048 BASIC METABOLIC PNL TOTAL CA: CPT

## 2018-04-28 PROCEDURE — 94761 N-INVAS EAR/PLS OXIMETRY MLT: CPT

## 2018-04-28 PROCEDURE — 63600175 PHARM REV CODE 636 W HCPCS: Performed by: HOSPITALIST

## 2018-04-28 PROCEDURE — 20600001 HC STEP DOWN PRIVATE ROOM

## 2018-04-28 PROCEDURE — 27000221 HC OXYGEN, UP TO 24 HOURS

## 2018-04-28 PROCEDURE — 99232 SBSQ HOSP IP/OBS MODERATE 35: CPT | Mod: GC,,, | Performed by: INTERNAL MEDICINE

## 2018-04-28 PROCEDURE — 25000003 PHARM REV CODE 250: Performed by: HOSPITALIST

## 2018-04-28 RX ORDER — POTASSIUM CHLORIDE 20 MEQ/15ML
40 SOLUTION ORAL ONCE
Status: COMPLETED | OUTPATIENT
Start: 2018-04-28 | End: 2018-04-28

## 2018-04-28 RX ADMIN — PANTOPRAZOLE SODIUM 40 MG: 40 GRANULE, DELAYED RELEASE ORAL at 09:04

## 2018-04-28 RX ADMIN — Medication 12.5 MG: at 09:04

## 2018-04-28 RX ADMIN — POTASSIUM CHLORIDE 40 MEQ: 20 SOLUTION ORAL at 09:04

## 2018-04-28 RX ADMIN — PREDNISONE 60 MG: 20 TABLET ORAL at 09:04

## 2018-04-28 RX ADMIN — Medication 12.5 MG: at 08:04

## 2018-04-28 RX ADMIN — AMLODIPINE BESYLATE 10 MG: 10 TABLET ORAL at 09:04

## 2018-04-28 RX ADMIN — ENOXAPARIN SODIUM 40 MG: 100 INJECTION SUBCUTANEOUS at 06:04

## 2018-04-28 NOTE — ASSESSMENT & PLAN NOTE
--Na+ peak at 152, slowly impoved with D5W  - 2/2 volume depletion 2/2 decreased PO intake  - start enteral water boluses once NGT/ TESSY tube in place

## 2018-04-28 NOTE — PT/OT/SLP PROGRESS
Occupational Therapy      Patient Name:  Selma Lux MD   MRN:  [id number]    Patient not seen today secondary to daughter's polite decline. Pt had just fallen asleep and per dtr, the Attending MD stated she should rest more today, particularly after recently having an NG tube placed, and receiving a bed bath. Pt will have a PEG tube placed Monday. Will follow up.    AYAD Montana  4/28/2018  Pager: [phone]

## 2018-04-28 NOTE — ASSESSMENT & PLAN NOTE
- likely 2/2 prolonged intubation - 11 days  - SLP following  - failed MBSS 4/25  - NG placed, on tube feeds  - plan for IR placed PEG on Monday 05/01

## 2018-04-28 NOTE — SUBJECTIVE & OBJECTIVE
Interval History: NG placed yesterday and now receiving tube feeds. Plan for PEG placement on Monday per interventional radiology. No acute events overnight.     Review of Systems   Constitutional: Negative for chills, fever and unexpected weight change.   HENT: Negative for hearing loss.    Eyes: Negative for visual disturbance.   Respiratory: Negative for shortness of breath and wheezing.    Cardiovascular: Positive for leg swelling. Negative for chest pain.   Gastrointestinal: Negative for abdominal pain, constipation, diarrhea, nausea and vomiting.   Genitourinary: Negative for dysuria.   Musculoskeletal: Negative for arthralgias.   Skin: Negative for rash.   Neurological: Negative for seizures.   Hematological: Negative for adenopathy. Does not bruise/bleed easily.   Psychiatric/Behavioral: Negative for agitation, behavioral problems and confusion.     Objective:     Vital Signs (Most Recent):  Temp: 98.6 °F (37 °C) (04/28/18 0918)  Pulse: 92 (04/28/18 0918)  Resp: 16 (04/28/18 0918)  BP: 129/60 (04/28/18 0918)  SpO2: 95 % (04/28/18 0918) Vital Signs (24h Range):  Temp:  [97 °F (36.1 °C)-98.6 °F (37 °C)] 98.6 °F (37 °C)  Pulse:  [68-92] 92  Resp:  [14-20] 16  SpO2:  [91 %-98 %] 95 %  BP: (115-135)/(56-73) 129/60     Weight: 71.5 kg (157 lb 10.1 oz)  Body mass index is 26.23 kg/m².    Intake/Output Summary (Last 24 hours) at 04/28/18 0929  Last data filed at 04/28/18 0600   Gross per 24 hour   Intake          6107.08 ml   Output                0 ml   Net          6107.08 ml      Physical Exam   Constitutional: She is oriented to person, place, and time. She appears well-developed and well-nourished.   HENT:   Head: Normocephalic and atraumatic.   Eyes: EOM are normal. Pupils are equal, round, and reactive to light.   Neck: No tracheal deviation present. No thyromegaly present.   Cardiovascular: Normal rate.    No murmur heard.  Pulmonary/Chest: Effort normal. She has no wheezes. She has no rales.   Abdominal:  Bowel sounds are normal. There is no tenderness.   Musculoskeletal: She exhibits edema (BLLE 1+). She exhibits no deformity.   Neurological: She is alert and oriented to person, place, and time. She exhibits normal muscle tone.   Skin: No rash noted. No erythema.   Vitals reviewed.      Significant Labs:   BMP:   Recent Labs  Lab 04/28/18  0439   GLU 73      K 3.2*   *   CO2 20*   BUN 18   CREATININE 0.7   CALCIUM 7.5*     CBC:   Recent Labs  Lab 04/27/18  0423   WBC 11.72   HGB 8.1*   HCT 27.2*   *       Significant Imaging: I have reviewed all pertinent imaging results/findings within the past 24 hours.

## 2018-04-28 NOTE — NURSING
Notified team that patient has partially pulled out NG tube (to 12) and is coughing up tube feeding. New orders received.

## 2018-04-28 NOTE — PLAN OF CARE
Problem: Patient Care Overview  Goal: Plan of Care Review  Outcome: Ongoing (interventions implemented as appropriate)  PT is AAOX3, no acute changes noted during shift, pt on bedrest and repositioned q2, tube feedings maintained and water bolus given q4, HOB maintained >than 30. VSS. Pt refuses blood glucose checks. MD notified.  No falls noted. Fall precautions remain Pain assessed. No pain noted. Pt resting comfortable in bed. Call light in reach. Will continue to monitor.

## 2018-04-29 PROCEDURE — 20600001 HC STEP DOWN PRIVATE ROOM

## 2018-04-29 PROCEDURE — 63600175 PHARM REV CODE 636 W HCPCS: Performed by: HOSPITALIST

## 2018-04-29 PROCEDURE — 94668 MNPJ CHEST WALL SBSQ: CPT

## 2018-04-29 PROCEDURE — 97530 THERAPEUTIC ACTIVITIES: CPT

## 2018-04-29 PROCEDURE — 94799 UNLISTED PULMONARY SVC/PX: CPT

## 2018-04-29 PROCEDURE — 27000221 HC OXYGEN, UP TO 24 HOURS

## 2018-04-29 PROCEDURE — 25000003 PHARM REV CODE 250: Performed by: HOSPITALIST

## 2018-04-29 PROCEDURE — 94761 N-INVAS EAR/PLS OXIMETRY MLT: CPT

## 2018-04-29 PROCEDURE — 25000003 PHARM REV CODE 250: Performed by: STUDENT IN AN ORGANIZED HEALTH CARE EDUCATION/TRAINING PROGRAM

## 2018-04-29 PROCEDURE — 97110 THERAPEUTIC EXERCISES: CPT

## 2018-04-29 PROCEDURE — 99900035 HC TECH TIME PER 15 MIN (STAT)

## 2018-04-29 PROCEDURE — 99232 SBSQ HOSP IP/OBS MODERATE 35: CPT | Mod: GC,,, | Performed by: INTERNAL MEDICINE

## 2018-04-29 RX ORDER — ENOXAPARIN SODIUM 100 MG/ML
40 INJECTION SUBCUTANEOUS EVERY 24 HOURS
Status: DISCONTINUED | OUTPATIENT
Start: 2018-05-01 | End: 2018-05-02 | Stop reason: HOSPADM

## 2018-04-29 RX ORDER — DEXTROSE MONOHYDRATE 50 MG/ML
INJECTION, SOLUTION INTRAVENOUS CONTINUOUS
Status: DISCONTINUED | OUTPATIENT
Start: 2018-04-29 | End: 2018-05-01

## 2018-04-29 RX ADMIN — AMLODIPINE BESYLATE 10 MG: 10 TABLET ORAL at 10:04

## 2018-04-29 RX ADMIN — PREDNISONE 60 MG: 20 TABLET ORAL at 10:04

## 2018-04-29 RX ADMIN — DEXTROSE: 5 SOLUTION INTRAVENOUS at 05:04

## 2018-04-29 RX ADMIN — Medication 12.5 MG: at 10:04

## 2018-04-29 RX ADMIN — PANTOPRAZOLE SODIUM 40 MG: 40 GRANULE, DELAYED RELEASE ORAL at 10:04

## 2018-04-29 RX ADMIN — Medication 12.5 MG: at 08:04

## 2018-04-29 NOTE — PROGRESS NOTES
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 She was seen by her PCP on 4/2 for these complaints and received a prescription for duo nebs and tessalon perles. CXR performed on 4/2 noted bilateral basilar infiltrates. At that time, she denied fever, chills, sputum production, sick contacts. She was admitted to  and treated emprically for CAP with rocephin and azithromycin. She was also diuresed daily. However, her oxygen requirements slowly increased from 2 L NC to 4 L NC with also progressively worsening bilateral infiltrates on imaging. CTA performed on 4/9 negative for PE, however noted significant bilateral consolidations. On 4/11, her hypoxemia continued to worsen, requiring NRB mask, and she developed severe respiratory distress. She was transferred to the MICU and emergently intubated. Repeat CT chest performed on 4/11 demonstrated progressive worsening of bilateral peripheral infiltrates.    Hospital Course:  Admitted to MICU on 4/11, intubated emergently for severe hypoxemia and respiratory distress. ID was consulted for assistance in management given immunocompromised state. Patient paralyzed with nimbex following persistent dysynchrony with vent despite sedation. Abx broadened to vancomycin, zosyn,  bactrim, and posaconazole. Norepinephrine initiated for BP support. Bronchoscopy performed at bedside on 4/11 and all cultures including fungal cultures negative. Nimbex discontinued on 4/12. She continued to require significant oxygen support thereafter, and was unable to wean oxygen requirements significantly since intubation. However, she has made slow improvements in her oxygenation since 4/18. She went into A-fib RVR in 120-160's on the evening of 4/17, and was briefly started on amiodarone gtt; this discontinued and metoprolol added with good rate control on 4/18. SAT/SBT continue daily with difficulty weaning her off fentanyl as she gets agitated tachycardic and hypertensive ('s). Valium initiated in effort to wean fentanyl off. Patient extubated on 4/22 to nasal cannula  4/24/2018 - Pt with chills/cold overnight & refusing meds, willing to take meds this morning. Hill placed this AM with good UOP. Pt still NPO per swallowing trial. Free water deficit 2.8L, starting with 500mL D5, will reassess, Pt oriented x3 this morning. Re-evaluate swallowing.  04/25/2018: Stepped down to hospital medicine. No acute events overnight, patient to have swallow evaluation today. Respiratory status stable, on 2L NC.   04/27/2018: No acute events overnight, patient continues to fail swallow studies. Will place NG tube today for nutrition and consult placed for general surgery for PEG placement.   04/28/2018: NG placed yesterday and now receiving tube feeds. Plan for PEG placement on Monday per interventional radiology. No acute events overnight.   04/29/2018: Patient removed NG yesterday afternoon, does not want it replaced. Plan for PEG placement tomorrow. No acute events overnight.     Interval History: Patient removed NG yesterday afternoon, does not want it replaced. Plan for PEG placement tomorrow. No acute events overnight.     Review of Systems   Constitutional: Negative for chills, fever and unexpected weight  change.   HENT: Negative for hearing loss.    Eyes: Negative for visual disturbance.   Respiratory: Negative for shortness of breath and wheezing.    Cardiovascular: Positive for leg swelling. Negative for chest pain.   Gastrointestinal: Negative for abdominal pain, constipation, diarrhea, nausea and vomiting.   Genitourinary: Negative for dysuria.   Musculoskeletal: Negative for arthralgias.   Skin: Negative for rash.   Neurological: Negative for seizures.   Hematological: Negative for adenopathy. Does not bruise/bleed easily.   Psychiatric/Behavioral: Negative for agitation, behavioral problems and confusion.     Objective:     Vital Signs (Most Recent):  Temp: 97.9 °F (36.6 °C) (04/29/18 0446)  Pulse: 69 (04/29/18 0446)  Resp: 16 (04/29/18 0446)  BP: 132/60 (04/29/18 0446)  SpO2: 97 % (04/29/18 0446) Vital Signs (24h Range):  Temp:  [96.8 °F (36 °C)-98.6 °F (37 °C)] 97.9 °F (36.6 °C)  Pulse:  [69-92] 69  Resp:  [16-18] 16  SpO2:  [94 %-98 %] 97 %  BP: (115-137)/(58-71) 132/60     Weight: 71.5 kg (157 lb 10.1 oz)  Body mass index is 26.23 kg/m².    Intake/Output Summary (Last 24 hours) at 04/29/18 0815  Last data filed at 04/29/18 0600   Gross per 24 hour   Intake              250 ml   Output              200 ml   Net               50 ml      Physical Exam   Constitutional: She is oriented to person, place, and time. She appears well-developed and well-nourished.   HENT:   Head: Normocephalic and atraumatic.   Eyes: EOM are normal. Pupils are equal, round, and reactive to light.   Neck: No tracheal deviation present. No thyromegaly present.   Cardiovascular: Normal rate.    No murmur heard.  Pulmonary/Chest: Effort normal. She has no wheezes. She has no rales.   Abdominal: Bowel sounds are normal. There is no tenderness.   Musculoskeletal: She exhibits edema (BLLE 1+). She exhibits no deformity.   Neurological: She is alert and oriented to person, place, and time. She exhibits normal muscle tone.   Skin: No rash  noted. No erythema.   Vitals reviewed.      Significant Labs:   BMP:   Recent Labs  Lab 04/28/18  0439   GLU 73      K 3.2*   *   CO2 20*   BUN 18   CREATININE 0.7   CALCIUM 7.5*       Significant Imaging: I have reviewed all pertinent imaging results/findings within the past 24 hours.    Assessment/Plan:      * Pneumonia of both lungs due to infectious organism     --see above  -- CXR 4/25- no significant change          Oropharyngeal dysphagia    - likely 2/2 prolonged intubation - 11 days  - SLP following  - failed MBSS 4/25  - NG removed   - plan for IR placed PEG on Monday 05/01        Acute hypoxemic respiratory failure    --Suspect secondary to severe bilateral pneumonia in the setting of immunosuppression; possibly  vs reaction from Humira   --Severely worsened over first week of admission despite CAP treatment.  --s/p emergent intubation on 4/11 upon MICU admission for severe hypoxemia and severe respiratory distress.  --Bronchoscopy on 4/11 with wash; culture with few WBCs, no organisms.  --Antibiotics broadened to vancomycin, zosyn, bactrim, and posaconazole initally; stopped now  --Blood, sputum, and urine cultures negative from 4/10 and 4/18  --KOH prep negative, AFB stain with no acid fast bacilli noted, fungal culture negative  --Aspergillus, Histoplasma, and Cryptococcal antigen in blood is negative. Legionella and blasomyces negative.   --Extubated on 4/22  - PO pred 60 qd for possibility of , duo nebs PRN for wheezing  - On Bactrim for PJP PPx currently  - Pt currently on 3L NC and sating well, with slightly decreased lung sounds in BLLL  - stepped down to hospital medicine 04/25/18  - on NC at 2 L  - monitor   - incentive spirometry           History of stroke    --Remote lacunar infarcts in the right centrum semi-ovale, basal ganglia, and right tatyana per CTH w/o contrast on 12/2017.  --Continue home clopidogrel.  -- PT/OT          Vascular dementia    - Chronic and stable  -  Continue Plavix 75mg PO daily             Long term (current) use of systemic steroids    --see above          Long QT interval              Hypertension, essential    - pt asymptomatic with essential HTN, on amlodipine 5,  Metoprolol 12.5 bid        Seropositive rheumatoid arthritis of multiple sites    --Appreciate Rheumatology assistance.  --Continue hydroxychloroquine- had to be stopped at unable to crush  -- switched to prednisone 60mg PO QD.            PUD (peptic ulcer disease)    --Continue PPI          Iron deficiency anemia secondary to inadequate dietary iron intake                VTE Risk Mitigation         Ordered     enoxaparin injection 40 mg  Daily      04/25/18 6486              Rashad Ramsey MD  Department of Hospital Medicine   [facility]

## 2018-04-29 NOTE — PLAN OF CARE
Problem: Physical Therapy Goal  Goal: Physical Therapy Goal  Goals to be met by: 18     Patient will increase functional independence with mobility by performin. Supine to sit with Moderate Assistance - not met  2. Sit to supine with Moderate Assistance - not met  3. Sit to stand transfer with Moderate Assistance - not met  4. Bed to chair transfer with Moderate Assistance using LRAD  5. Gait  x 20 feet with Moderate Assistance using LRAD    6. Ascend/descend 5 stair with bilateral Handrails Moderate Assistance using Single-point Cane .   7. Lower extremity exercise program x20 reps per handout, with supervision      Outcome: Ongoing (interventions implemented as appropriate)  Postural strength in sitting and general strength continues to be very limited.

## 2018-04-29 NOTE — PT/OT/SLP PROGRESS
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  She presents with the following impairments/functional limitations:  weakness, impaired endurance, impaired self care skills, impaired functional mobilty, impaired balance, decreased coordination, decreased upper extremity function, decreased lower extremity function, decreased safety awareness, edema, impaired cardiopulmonary response to activity . Patient fatigued very easily with sitting balance and required encouragement. Patient with very limited postural control even in sitting. Moderate swelling on B LE restricted PROM.    Rehab Prognosis:  fair; patient would benefit from acute skilled PT services to address these deficits and reach maximum level of function.      Recent Surgery: * No surgery found *      Plan:     During this hospitalization, patient to be seen 5 x/week to address the above listed problems via gait training, therapeutic activities, therapeutic exercises, neuromuscular re-education  · Plan of Care Expires:  05/12/18   Plan of Care Reviewed with: patient, daughter    Subjective     Communicated with RN prior to session.  Patient found in supine upon PT entry to room, agreeable to treatment.   Patient states " I don't have any pain, my legs just feel heavy".     Chief Complaint: weakness  Patient comments/goals: to get stronger  Pain/Comfort:  · Pain Rating 1: 0/10  · Pain Rating Post-Intervention 1: 0/10    Patients cultural, spiritual, Druze conflicts given the current situation: None stated    Objective:     Patient found with: telemetry, oxygen "     General Precautions: Standard, aspiration, fall, NPO   Orthopedic Precautions:N/A   Braces: N/A     Functional Mobility:  · Bed Mobility:     · Rolling Left:  total assistance  · Scooting: total assistance  · Supine to Sit: total assistance  · Sit to Supine: total assistance  · Balance: poor in sitting      AM-PAC 6 CLICK MOBILITY  Turning over in bed (including adjusting bedclothes, sheets and blankets)?: 2  Sitting down on and standing up from a chair with arms (e.g., wheelchair, bedside commode, etc.): 1  Moving from lying on back to sitting on the side of the bed?: 2  Moving to and from a bed to a chair (including a wheelchair)?: 1  Need to walk in hospital room?: 1  Climbing 3-5 steps with a railing?: 1  Total Score: 8       Therapeutic Activities and Exercises:   Static sitting balance at EOB x 10 minutes with min to mod assistance. B LE PROM x 30 reps on all available planes of motion. Repositioned in bed comfort and elevated B heels on a pillow to prevent Decubitus.     Patient left HOB elevated with all lines intact, call button in reach, RN notified and Daughter present..    GOALS:    Physical Therapy Goals        Problem: Physical Therapy Goal    Goal Priority Disciplines Outcome Goal Variances Interventions   Physical Therapy Goal     PT/OT, PT Ongoing (interventions implemented as appropriate)     Description:  Goals to be met by: 18     Patient will increase functional independence with mobility by performin. Supine to sit with Moderate Assistance - not met  2. Sit to supine with Moderate Assistance - not met  3. Sit to stand transfer with Moderate Assistance - not met  4. Bed to chair transfer with Moderate Assistance using LRAD  5. Gait  x 20 feet with Moderate Assistance using LRAD    6. Ascend/descend 5 stair with bilateral Handrails Moderate Assistance using Single-point Cane .   7. Lower extremity exercise program x20 reps per handout, with supervision                       Time  Tracking:     PT Received On: 04/29/18  PT Start Time: 1442     PT Stop Time: 1507  PT Total Time (min): 25 min     Billable Minutes: Therapeutic Activity 15 and Therapeutic Exercise 10    Treatment Type: Treatment  PT/PTA: PTA     PTA Visit Number: 1     Gregorio Huber, GIANNI  04/29/2018

## 2018-04-29 NOTE — PLAN OF CARE
Problem: Patient Care Overview  Goal: Plan of Care Review  Outcome: Ongoing (interventions implemented as appropriate)  PT is AAOX3, no acute changes noted during shift, pt is on bedrest and repositioned q2, slight skin breakdown noted on sacrum, barrier cream applied, pt tolerated well, VSS. No falls noted. Fall precautions remain Pain assessed. No pain noted. Pt resting comfortable in bed. Call light in reach. Will continue to monitor.

## 2018-04-29 NOTE — ASSESSMENT & PLAN NOTE
- likely 2/2 prolonged intubation - 11 days  - SLP following  - failed MBSS 4/25  - NG removed   - plan for IR placed PEG on Monday 05/01

## 2018-04-29 NOTE — SUBJECTIVE & OBJECTIVE
Interval History: Patient removed NG yesterday afternoon, does not want it replaced. Plan for PEG placement tomorrow. No acute events overnight.     Review of Systems   Constitutional: Negative for chills, fever and unexpected weight change.   HENT: Negative for hearing loss.    Eyes: Negative for visual disturbance.   Respiratory: Negative for shortness of breath and wheezing.    Cardiovascular: Positive for leg swelling. Negative for chest pain.   Gastrointestinal: Negative for abdominal pain, constipation, diarrhea, nausea and vomiting.   Genitourinary: Negative for dysuria.   Musculoskeletal: Negative for arthralgias.   Skin: Negative for rash.   Neurological: Negative for seizures.   Hematological: Negative for adenopathy. Does not bruise/bleed easily.   Psychiatric/Behavioral: Negative for agitation, behavioral problems and confusion.     Objective:     Vital Signs (Most Recent):  Temp: 97.9 °F (36.6 °C) (04/29/18 0446)  Pulse: 69 (04/29/18 0446)  Resp: 16 (04/29/18 0446)  BP: 132/60 (04/29/18 0446)  SpO2: 97 % (04/29/18 0446) Vital Signs (24h Range):  Temp:  [96.8 °F (36 °C)-98.6 °F (37 °C)] 97.9 °F (36.6 °C)  Pulse:  [69-92] 69  Resp:  [16-18] 16  SpO2:  [94 %-98 %] 97 %  BP: (115-137)/(58-71) 132/60     Weight: 71.5 kg (157 lb 10.1 oz)  Body mass index is 26.23 kg/m².    Intake/Output Summary (Last 24 hours) at 04/29/18 0815  Last data filed at 04/29/18 0600   Gross per 24 hour   Intake              250 ml   Output              200 ml   Net               50 ml      Physical Exam   Constitutional: She is oriented to person, place, and time. She appears well-developed and well-nourished.   HENT:   Head: Normocephalic and atraumatic.   Eyes: EOM are normal. Pupils are equal, round, and reactive to light.   Neck: No tracheal deviation present. No thyromegaly present.   Cardiovascular: Normal rate.    No murmur heard.  Pulmonary/Chest: Effort normal. She has no wheezes. She has no rales.   Abdominal: Bowel  sounds are normal. There is no tenderness.   Musculoskeletal: She exhibits edema (BLLE 1+). She exhibits no deformity.   Neurological: She is alert and oriented to person, place, and time. She exhibits normal muscle tone.   Skin: No rash noted. No erythema.   Vitals reviewed.      Significant Labs:   BMP:   Recent Labs  Lab 04/28/18  0439   GLU 73      K 3.2*   *   CO2 20*   BUN 18   CREATININE 0.7   CALCIUM 7.5*       Significant Imaging: I have reviewed all pertinent imaging results/findings within the past 24 hours.

## 2018-04-30 LAB
ANION GAP SERPL CALC-SCNC: 6 MMOL/L
BASOPHILS # BLD AUTO: 0.01 K/UL
BASOPHILS NFR BLD: 0.1 %
BUN SERPL-MCNC: 14 MG/DL
CALCIUM SERPL-MCNC: 7.5 MG/DL
CHLORIDE SERPL-SCNC: 107 MMOL/L
CO2 SERPL-SCNC: 23 MMOL/L
CREAT SERPL-MCNC: 0.6 MG/DL
DIFFERENTIAL METHOD: ABNORMAL
EOSINOPHIL # BLD AUTO: 0.3 K/UL
EOSINOPHIL NFR BLD: 2.8 %
ERYTHROCYTE [DISTWIDTH] IN BLOOD BY AUTOMATED COUNT: 18.9 %
EST. GFR  (AFRICAN AMERICAN): >60 ML/MIN/1.73 M^2
EST. GFR  (NON AFRICAN AMERICAN): >60 ML/MIN/1.73 M^2
GLUCOSE SERPL-MCNC: 83 MG/DL
HCT VFR BLD AUTO: 26.2 %
HGB BLD-MCNC: 8 G/DL
IMM GRANULOCYTES # BLD AUTO: 0.04 K/UL
IMM GRANULOCYTES NFR BLD AUTO: 0.4 %
LYMPHOCYTES # BLD AUTO: 1.6 K/UL
LYMPHOCYTES NFR BLD: 14.9 %
MCH RBC QN AUTO: 24.8 PG
MCHC RBC AUTO-ENTMCNC: 30.5 G/DL
MCV RBC AUTO: 81 FL
MONOCYTES # BLD AUTO: 1.5 K/UL
MONOCYTES NFR BLD: 14.4 %
NEUTROPHILS # BLD AUTO: 7.1 K/UL
NEUTROPHILS NFR BLD: 67.4 %
NRBC BLD-RTO: 0 /100 WBC
PLATELET # BLD AUTO: 103 K/UL
PMV BLD AUTO: 10.9 FL
POCT GLUCOSE: 183 MG/DL (ref 70–110)
POCT GLUCOSE: 95 MG/DL (ref 70–110)
POCT GLUCOSE: 96 MG/DL (ref 70–110)
POTASSIUM SERPL-SCNC: 3.3 MMOL/L
RBC # BLD AUTO: 3.23 M/UL
SODIUM SERPL-SCNC: 136 MMOL/L
WBC # BLD AUTO: 10.54 K/UL

## 2018-04-30 PROCEDURE — 25000003 PHARM REV CODE 250: Performed by: STUDENT IN AN ORGANIZED HEALTH CARE EDUCATION/TRAINING PROGRAM

## 2018-04-30 PROCEDURE — 25500020 PHARM REV CODE 255: Performed by: INTERNAL MEDICINE

## 2018-04-30 PROCEDURE — 25000003 PHARM REV CODE 250: Performed by: RADIOLOGY

## 2018-04-30 PROCEDURE — 27000221 HC OXYGEN, UP TO 24 HOURS

## 2018-04-30 PROCEDURE — 20600001 HC STEP DOWN PRIVATE ROOM

## 2018-04-30 PROCEDURE — 36415 COLL VENOUS BLD VENIPUNCTURE: CPT

## 2018-04-30 PROCEDURE — 25000003 PHARM REV CODE 250: Performed by: HOSPITALIST

## 2018-04-30 PROCEDURE — 63600175 PHARM REV CODE 636 W HCPCS: Performed by: HOSPITALIST

## 2018-04-30 PROCEDURE — 92526 ORAL FUNCTION THERAPY: CPT

## 2018-04-30 PROCEDURE — 99490 CHRNC CARE MGMT STAFF 1ST 20: CPT | Mod: PBBFAC | Performed by: FAMILY MEDICINE

## 2018-04-30 PROCEDURE — 0DH63UZ INSERTION OF FEEDING DEVICE INTO STOMACH, PERCUTANEOUS APPROACH: ICD-10-PCS | Performed by: RADIOLOGY

## 2018-04-30 PROCEDURE — 97530 THERAPEUTIC ACTIVITIES: CPT

## 2018-04-30 PROCEDURE — 63600175 PHARM REV CODE 636 W HCPCS: Performed by: RADIOLOGY

## 2018-04-30 PROCEDURE — 99490 CHRNC CARE MGMT STAFF 1ST 20: CPT | Mod: S$PBB,,, | Performed by: FAMILY MEDICINE

## 2018-04-30 PROCEDURE — 80048 BASIC METABOLIC PNL TOTAL CA: CPT

## 2018-04-30 PROCEDURE — 85025 COMPLETE CBC W/AUTO DIFF WBC: CPT

## 2018-04-30 PROCEDURE — 99232 SBSQ HOSP IP/OBS MODERATE 35: CPT | Mod: GC,,, | Performed by: INTERNAL MEDICINE

## 2018-04-30 PROCEDURE — 94668 MNPJ CHEST WALL SBSQ: CPT

## 2018-04-30 PROCEDURE — 99900035 HC TECH TIME PER 15 MIN (STAT)

## 2018-04-30 RX ORDER — GLUCAGON 1 MG
KIT INJECTION CODE/TRAUMA/SEDATION MEDICATION
Status: COMPLETED | OUTPATIENT
Start: 2018-04-30 | End: 2018-04-30

## 2018-04-30 RX ORDER — MIDAZOLAM HYDROCHLORIDE 1 MG/ML
INJECTION INTRAMUSCULAR; INTRAVENOUS CODE/TRAUMA/SEDATION MEDICATION
Status: COMPLETED | OUTPATIENT
Start: 2018-04-30 | End: 2018-04-30

## 2018-04-30 RX ORDER — HYDROCODONE BITARTRATE AND ACETAMINOPHEN 5; 325 MG/1; MG/1
1 TABLET ORAL EVERY 4 HOURS PRN
Status: DISCONTINUED | OUTPATIENT
Start: 2018-04-30 | End: 2018-05-01

## 2018-04-30 RX ORDER — FENTANYL CITRATE 50 UG/ML
INJECTION, SOLUTION INTRAMUSCULAR; INTRAVENOUS CODE/TRAUMA/SEDATION MEDICATION
Status: COMPLETED | OUTPATIENT
Start: 2018-04-30 | End: 2018-04-30

## 2018-04-30 RX ORDER — CEFAZOLIN SODIUM 1 G/50ML
SOLUTION INTRAVENOUS
Status: COMPLETED | OUTPATIENT
Start: 2018-04-30 | End: 2018-04-30

## 2018-04-30 RX ADMIN — HYDROCODONE BITARTRATE AND ACETAMINOPHEN 1 TABLET: 5; 325 TABLET ORAL at 09:04

## 2018-04-30 RX ADMIN — MIDAZOLAM HYDROCHLORIDE 1 MG: 1 INJECTION, SOLUTION INTRAMUSCULAR; INTRAVENOUS at 04:04

## 2018-04-30 RX ADMIN — INSULIN ASPART 2 UNITS: 100 INJECTION, SOLUTION INTRAVENOUS; SUBCUTANEOUS at 09:04

## 2018-04-30 RX ADMIN — FENTANYL CITRATE 50 MCG: 50 INJECTION, SOLUTION INTRAMUSCULAR; INTRAVENOUS at 04:04

## 2018-04-30 RX ADMIN — DEXTROSE: 5 SOLUTION INTRAVENOUS at 04:04

## 2018-04-30 RX ADMIN — AMLODIPINE BESYLATE 10 MG: 10 TABLET ORAL at 09:04

## 2018-04-30 RX ADMIN — CEFAZOLIN SODIUM 1 G: 1 SOLUTION INTRAVENOUS at 04:04

## 2018-04-30 RX ADMIN — PANTOPRAZOLE SODIUM 40 MG: 40 GRANULE, DELAYED RELEASE ORAL at 09:04

## 2018-04-30 RX ADMIN — IOHEXOL 15 ML: 300 INJECTION, SOLUTION INTRAVENOUS at 05:04

## 2018-04-30 RX ADMIN — Medication 12.5 MG: at 09:04

## 2018-04-30 RX ADMIN — SULFAMETHOXAZOLE AND TRIMETHOPRIM 20 ML: 200; 40 SUSPENSION ORAL at 09:04

## 2018-04-30 RX ADMIN — DEXTROSE: 5 SOLUTION INTRAVENOUS at 01:04

## 2018-04-30 RX ADMIN — PREDNISONE 60 MG: 20 TABLET ORAL at 09:04

## 2018-04-30 RX ADMIN — GLUCAGON HYDROCHLORIDE 1 MG: KIT at 04:04

## 2018-04-30 RX ADMIN — Medication 12.5 MG: at 11:04

## 2018-04-30 NOTE — SUBJECTIVE & OBJECTIVE
Interval History:  Patient refused placement of NG tube required for PEG placement overnight, spoke to IR and attempting to have to replaced so that procedure can be performed (required for barium swallow). Otherwise no acute events overnight.     Review of Systems   Constitutional: Negative for chills, fever and unexpected weight change.   HENT: Negative for hearing loss.    Eyes: Negative for visual disturbance.   Respiratory: Negative for shortness of breath and wheezing.    Cardiovascular: Positive for leg swelling. Negative for chest pain.   Gastrointestinal: Negative for abdominal pain, constipation, diarrhea, nausea and vomiting.   Genitourinary: Negative for dysuria.   Musculoskeletal: Negative for arthralgias.   Skin: Negative for rash.   Neurological: Negative for seizures.   Hematological: Negative for adenopathy. Does not bruise/bleed easily.   Psychiatric/Behavioral: Negative for agitation, behavioral problems and confusion.     Objective:     Vital Signs (Most Recent):  Temp: 97.9 °F (36.6 °C) (04/30/18 1146)  Pulse: 74 (04/30/18 1146)  Resp: 18 (04/30/18 0803)  BP: 122/61 (04/30/18 1146)  SpO2: (!) 93 % (04/30/18 1146) Vital Signs (24h Range):  Temp:  [97.8 °F (36.6 °C)-98.2 °F (36.8 °C)] 97.9 °F (36.6 °C)  Pulse:  [67-79] 74  Resp:  [18-20] 18  SpO2:  [93 %-98 %] 93 %  BP: (122-132)/(59-61) 122/61     Weight: 71.5 kg (157 lb 10.1 oz)  Body mass index is 26.23 kg/m².    Intake/Output Summary (Last 24 hours) at 04/30/18 1318  Last data filed at 04/30/18 0600   Gross per 24 hour   Intake             1350 ml   Output              950 ml   Net              400 ml      Physical Exam   Constitutional: She is oriented to person, place, and time. She appears well-developed and well-nourished.   HENT:   Head: Normocephalic and atraumatic.   Eyes: EOM are normal. Pupils are equal, round, and reactive to light.   Neck: No tracheal deviation present. No thyromegaly present.   Cardiovascular: Normal rate.    No  murmur heard.  Pulmonary/Chest: Effort normal. She has no wheezes. She has no rales.   Abdominal: Bowel sounds are normal. There is no tenderness.   Musculoskeletal: She exhibits edema (BLLE 1+). She exhibits no deformity.   Neurological: She is alert and oriented to person, place, and time. She exhibits normal muscle tone.   Skin: No rash noted. No erythema.   Vitals reviewed.      Significant Labs:   BMP:   Recent Labs  Lab 04/30/18  0600   GLU 83      K 3.3*      CO2 23   BUN 14   CREATININE 0.6   CALCIUM 7.5*     CBC:   Recent Labs  Lab 04/30/18  0600   WBC 10.54   HGB 8.0*   HCT 26.2*   *       Significant Imaging: I have reviewed all pertinent imaging results/findings within the past 24 hours.

## 2018-04-30 NOTE — PROGRESS NOTES
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 She was seen by her PCP on 4/2 for these complaints and received a prescription for duo nebs and tessalon perles. CXR performed on 4/2 noted bilateral basilar infiltrates. At that time, she denied fever, chills, sputum production, sick contacts. She was admitted to  and treated emprically for CAP with rocephin and azithromycin. She was also diuresed daily. However, her oxygen requirements slowly increased from 2 L NC to 4 L NC with also progressively worsening bilateral infiltrates on imaging. CTA performed on 4/9 negative for PE, however noted significant bilateral consolidations. On 4/11, her hypoxemia continued to worsen, requiring NRB mask, and she developed severe respiratory distress. She was transferred to the MICU and emergently intubated. Repeat CT chest performed on 4/11 demonstrated progressive worsening of bilateral peripheral infiltrates.    Hospital Course:  Admitted to MICU on 4/11, intubated emergently for severe hypoxemia and respiratory distress. ID was consulted for assistance in management given immunocompromised state. Patient paralyzed with nimbex following persistent dysynchrony with vent despite sedation. Abx broadened to vancomycin, zosyn,  bactrim, and posaconazole. Norepinephrine initiated for BP support. Bronchoscopy performed at bedside on 4/11 and all cultures including fungal cultures negative. Nimbex discontinued on 4/12. She continued to require significant oxygen support thereafter, and was unable to wean oxygen requirements significantly since intubation. However, she has made slow improvements in her oxygenation since 4/18. She went into A-fib RVR in 120-160's on the evening of 4/17, and was briefly started on amiodarone gtt; this discontinued and metoprolol added with good rate control on 4/18. SAT/SBT continue daily with difficulty weaning her off fentanyl as she gets agitated tachycardic and hypertensive ('s). Valium initiated in effort to wean fentanyl off. Patient extubated on 4/22 to nasal cannula  4/24/2018 - Pt with chills/cold overnight & refusing meds, willing to take meds this morning. Hill placed this AM with good UOP. Pt still NPO per swallowing trial. Free water deficit 2.8L, starting with 500mL D5, will reassess, Pt oriented x3 this morning. Re-evaluate swallowing.  04/25/2018: Stepped down to hospital medicine. No acute events overnight, patient to have swallow evaluation today. Respiratory status stable, on 2L NC.   04/27/2018: No acute events overnight, patient continues to fail swallow studies. Will place NG tube today for nutrition and consult placed for general surgery for PEG placement.   04/28/2018: NG placed yesterday and now receiving tube feeds. Plan for PEG placement on Monday per interventional radiology. No acute events overnight.   04/29/2018: Patient removed NG yesterday afternoon, does not want it replaced. Plan for PEG placement tomorrow. No acute events overnight.   04/30/2018: Patient refused placement of NG tube required for PEG placement overnight, spoke to IR and attempting to have to replaced so that procedure can be performed (required for barium swallow). Otherwise no acute events overnight.      Interval History:  Patient refused placement of NG tube required for PEG placement overnight, spoke to IR and attempting to have to replaced so that procedure can be performed (required for barium swallow). Otherwise no acute events overnight.     Review of Systems   Constitutional: Negative for chills, fever and unexpected weight change.   HENT: Negative for hearing loss.    Eyes: Negative for visual disturbance.   Respiratory: Negative for shortness of breath and wheezing.    Cardiovascular: Positive for leg swelling. Negative for chest pain.   Gastrointestinal: Negative for abdominal pain, constipation, diarrhea, nausea and vomiting.   Genitourinary: Negative for dysuria.   Musculoskeletal: Negative for arthralgias.   Skin: Negative for rash.   Neurological: Negative for seizures.   Hematological: Negative for adenopathy. Does not bruise/bleed easily.   Psychiatric/Behavioral: Negative for agitation, behavioral problems and confusion.     Objective:     Vital Signs (Most Recent):  Temp: 97.9 °F (36.6 °C) (04/30/18 1146)  Pulse: 74 (04/30/18 1146)  Resp: 18 (04/30/18 0803)  BP: 122/61 (04/30/18 1146)  SpO2: (!) 93 % (04/30/18 1146) Vital Signs (24h Range):  Temp:  [97.8 °F (36.6 °C)-98.2 °F (36.8 °C)] 97.9 °F (36.6 °C)  Pulse:  [67-79] 74  Resp:  [18-20] 18  SpO2:  [93 %-98 %] 93 %  BP: (122-132)/(59-61) 122/61     Weight: 71.5 kg (157 lb 10.1 oz)  Body mass index is 26.23 kg/m².    Intake/Output Summary (Last 24 hours) at 04/30/18 1318  Last data filed at 04/30/18 0600   Gross per 24 hour   Intake             1350 ml   Output              950 ml   Net              400 ml      Physical Exam   Constitutional: She is oriented to person, place, and time. She appears well-developed and well-nourished.   HENT:   Head: Normocephalic and atraumatic.   Eyes: EOM are normal. Pupils are equal, round, and reactive to light.   Neck: No tracheal deviation present. No thyromegaly present.   Cardiovascular: Normal rate.     No murmur heard.  Pulmonary/Chest: Effort normal. She has no wheezes. She has no rales.   Abdominal: Bowel sounds are normal. There is no tenderness.   Musculoskeletal: She exhibits edema (BLLE 1+). She exhibits no deformity.   Neurological: She is alert and oriented to person, place, and time. She exhibits normal muscle tone.   Skin: No rash noted. No erythema.   Vitals reviewed.      Significant Labs:   BMP:   Recent Labs  Lab 04/30/18  0600   GLU 83      K 3.3*      CO2 23   BUN 14   CREATININE 0.6   CALCIUM 7.5*     CBC:   Recent Labs  Lab 04/30/18  0600   WBC 10.54   HGB 8.0*   HCT 26.2*   *       Significant Imaging: I have reviewed all pertinent imaging results/findings within the past 24 hours.    Assessment/Plan:      * Pneumonia of both lungs due to infectious organism     --see above  -- CXR 4/25- no significant change          Oropharyngeal dysphagia    - likely 2/2 prolonged intubation - 11 days  - SLP following  - failed MBSS 4/25  - NG removed   - plan for IR placed PEG on Monday 05/01        Acute hypoxemic respiratory failure    --Suspect secondary to severe bilateral pneumonia in the setting of immunosuppression; possibly  vs reaction from Humira   --Severely worsened over first week of admission despite CAP treatment.  --s/p emergent intubation on 4/11 upon MICU admission for severe hypoxemia and severe respiratory distress.  --Bronchoscopy on 4/11 with wash; culture with few WBCs, no organisms.  --Antibiotics broadened to vancomycin, zosyn, bactrim, and posaconazole initally; stopped now  --Blood, sputum, and urine cultures negative from 4/10 and 4/18  --KOH prep negative, AFB stain with no acid fast bacilli noted, fungal culture negative  --Aspergillus, Histoplasma, and Cryptococcal antigen in blood is negative. Legionella and blasomyces negative.   --Extubated on 4/22  - PO pred 60 qd for possibility of , duo nebs PRN for wheezing  - On Bactrim for PJP PPx  currently  - Pt currently on 3L NC and sating well, with slightly decreased lung sounds in BLLL  - stepped down to hospital medicine 04/25/18  - on NC at 2 L  - monitor   - incentive spirometry           History of stroke    --Remote lacunar infarcts in the right centrum semi-ovale, basal ganglia, and right tatyana per CTH w/o contrast on 12/2017.  --Continue home clopidogrel.  -- PT/OT          Vascular dementia    - Chronic and stable  - Continue Plavix 75mg PO daily             Long term (current) use of systemic steroids    --see above          Long QT interval              Hypertension, essential    - pt asymptomatic with essential HTN, on amlodipine 5,  Metoprolol 12.5 bid        Seropositive rheumatoid arthritis of multiple sites    --Appreciate Rheumatology assistance.  --Continue hydroxychloroquine- had to be stopped at unable to crush  -- switched to prednisone 60mg PO QD.            PUD (peptic ulcer disease)    --Continue PPI          Iron deficiency anemia secondary to inadequate dietary iron intake                VTE Risk Mitigation         Ordered     enoxaparin injection 40 mg  Daily      04/29/18 1142              Rashad Ramsey MD  Department of Hospital Medicine   [facility]

## 2018-04-30 NOTE — PT/OT/SLP PROGRESS
Speech Language Pathology Treatment    Patient Name:  Selma Alonzo Lux MD   MRN:  [id number]  Admitting Diagnosis: Pneumonia of both lungs due to infectious organism    Recommendations:                 General Recommendations:  Dysphagia therapy  Diet recommendations:  NPO, Liquid Diet Level: NPO   Aspiration Precautions: Continue alternate means of nutrition, Ice chips sparingly and Strict aspiration precautions   General Precautions: Standard, aspiration, NPO, fall  Communication strategies:  none    Subjective   Awake & alert. Pt discouraged/frustrated regarding swallowing & having to have NG placement prior to PEG procedure. MD was at bedside for portion of session, explained need for NG for contrast prior to peg placement.     Pain/Comfort:  · Pain Rating 1: 0/10  · Pain Rating Post-Intervention 2: 0/10    Objective:     Has the patient been evaluated by SLP for swallowing?   Yes  Keep patient NPO? Yes   Current Respiratory Status: nasal canula      Pt repositioned to upright. Pt with clear voicing prior to PO trials. Pt able to elicit adequate cough & adequate volitional swallow. Pt accepted ice chips x2 & thin via spoon x1 with no overt s/s aspiration. When given small cup sips x2, pt with no overt s/s aspiration yet multiple spontaneous swallows completed with each sip, likely 2/2 stasis. No further PO trials presented 2/2 high risk of aspiration as reviewed from recent MBSS & 2/2 pt having procedure for PEG placement later today. Pt was presented with list of dysphagia exercises & SLP reviewed each & pt demonstrated. Pt concered about NGT & PEG, all questions answered & pt educated on expectations/plan for continued ST service following PEG.     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  She presents with high risk of aspiration.     Goals:    SLP Goals        Problem: SLP Goal    Goal Priority Disciplines Outcome   SLP Goal     SLP Ongoing  (interventions implemented as appropriate)   Description:  Speech Therapy Short Term Goals  Goal expected to be met by 5/2  1. Patient will complete dysphagia therapy exercises x10 with min cues.    Speech Language Pathology Goals  Goals expected to be met by 4/30 1. Patient will participate in ongoing swallow assessment                        Plan:     · Patient to be seen:  5 x/week   · Plan of Care expires:  05/23/18  · Plan of Care reviewed with:  patient   · SLP Follow-Up:  Yes       Discharge recommendations:  nursing facility, skilled     Time Tracking:     SLP Treatment Date:   04/30/18  Speech Start Time:  1030  Speech Stop Time:  1053     Speech Total Time (min):  23 min    Billable Minutes: Treatment Swallowing Dysfunction 23    Teri Michael CCC-SLP  04/30/2018

## 2018-04-30 NOTE — H&P
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     Admission H&P reviewed.  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    Hypertension     IBS (irritable bowel syndrome) 6/21/2017    IGT (impaired glucose tolerance) 1/12/2016    Iron deficiency anemia secondary to inadequate dietary iron intake 6/24/2013    Joint pain     Keratoconjunctivitis sicca of both eyes not specified as Sjogren's 7/29/2016    Leiomyoma of uterus, unspecified 9/16/2014    Long QT interval 6/29/2016    Due to medication (plaquenil)     Macular edema 1/12/2015    Multinodular goiter 1/12/2016    Neuropathy 8/23/2017    Plaquenil causing adverse effect in therapeutic use 10/7/2016    Pneumococcal vaccine refused 8/17/2016    Pneumonia due to Streptococcus pneumoniae 4/5/2018    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/2015    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    s/p Right Total knee replacement 5/15/2013    Scoliosis of thoracic spine 5/15/2013    Small vessel disease, cerebrovascular 12/28/2017    Stroke     Vascular dementia 12/6/2017     Past Surgical History:   Procedure Laterality Date    CATARACT EXTRACTION      COLONOSCOPY N/A 9/29/2015    Procedure: COLONOSCOPY;  Surgeon: FIDELINA Sanchez MD;  Location: [address];  Service: Endoscopy;  Laterality: N/A;    EYE  SURGERY      JOINT REPLACEMENT      right knee    KNEE SURGERY Left 12/31/2013    TKR    left parotidectomy      mixed tumor    SALIVARY GLAND SURGERY      TONSILLECTOMY         Review of Systems:   As documented in primary team H&P    Home Meds:   Prior to Admission medications    Medication Sig Start Date End Date Taking? Authorizing Provider   adalimumab (HUMIRA PEN) PnKt injection Inject 0.8 mLs (40 mg total) into the skin every 14 (fourteen) days. 3/12/18 5/11/18 Yes Jacoby Prakash MD   albuterol 90 mcg/actuation inhaler Inhale 2 puffs into the lungs every 6 (six) hours as needed for Wheezing or Shortness of Breath. 4/2/18 5/2/18 Yes Bhargav Lee MD   amlodipine (NORVASC) 5 MG tablet TAKE 1 TABLET DAILY 7/24/17  Yes Bhargav Lee MD   baclofen (LIORESAL) 10 MG tablet Take 1 tablet (10 mg total) by mouth 3 (three) times daily. 1/4/18  Yes Dee Thomson MD   benzonatate (TESSALON) 200 MG capsule Take 1 capsule (200 mg total) by mouth 3 (three) times daily as needed for Cough. 4/2/18  Yes Bhargav Lee MD   clopidogrel (PLAVIX) 75 mg tablet Take 1 tablet (75 mg total) by mouth once daily. 3/16/18  Yes Bhargav Lee MD   gabapentin (NEURONTIN) 300 MG capsule Take 1-2 capsules (300-600 mg total) by mouth 3 (three) times daily. 1/4/18  Yes Dee Thomson MD   hydroxychloroquine (PLAQUENIL) 200 mg tablet TAKE 2 TABLETS ONCE DAILY 10/25/17  Yes Chu Qureshi MD   linaclotide (LINZESS) 145 mcg Cap capsule Take 1 capsule (145 mcg total) by mouth once daily. 1/8/18  Yes Mg Morton MD   montelukast (SINGULAIR) 10 mg tablet TAKE 1 TABLET EVERY EVENING 8/10/17  Yes Bhargav Lee MD   omeprazole (PRILOSEC) 20 MG capsule Take 1 capsule (20 mg total) by mouth once daily. 3/16/18  Yes Bhargav Lee MD   predniSONE (DELTASONE) 5 MG tablet TAKE 2 TABLETS ONCE DAILY  Patient taking differently: TAKE 1 TABLETS ONCE DAILY 2/19/18  Yes Jacoby SHARPE  MD [place]   sertraline (ZOLOFT) 25 MG tablet Take 25 mg by mouth once daily.   Yes Historical Provider, MD   diazePAM (VALIUM) 2 MG tablet Take 1 tablet (2 mg total) by mouth every evening. 1/22/18   Baldomero Giang MD   ferrous sulfate 325 (65 FE) MG EC tablet Take 325 mg by mouth once daily.     Historical Provider, MD   hydrocodone-acetaminophen 5-325mg (NORCO) 5-325 mg per tablet Take 1 tablet by mouth every 24 hours as needed for Pain. 2/9/18   Bhargav Lee MD   omega 7-bxr-hhe-fish oil 250-350-1,000 mg Cap Take 1 capsule by mouth 2 (two) times daily. 2/9/18   Bhargav Lee MD   thiamine 100 MG tablet Take 1 tablet (100 mg total) by mouth once daily. 8/29/17   Branden Rubio MD     Scheduled Meds:    amLODIPine  10 mg Per NG tube Daily    barium  450 mL Oral Once    barium  450 mL Oral Once    [START ON 5/1/2018] enoxaparin  40 mg Subcutaneous Daily    metoprolol  12.5 mg Per NG tube BID    pantoprazole  40 mg Per NG tube Daily    predniSONE  60 mg Per NG tube Daily    sulfamethoxazole-trimethoprim 200-40 mg/5 ml  20 mL Per NG tube Every Mon, Wed, Fri    tuberculin  5 Units Intradermal Once     Continuous Infusions:    dextrose 5 % 100 mL/hr at 04/30/18 1348     PRN Meds:acetaminophen, albuterol-ipratropium 2.5mg-0.5mg/3mL, dextrose 50%, glucagon (human recombinant), insulin aspart U-100, ramelteon, sodium chloride 0.9%  Anticoagulants/Antiplatelets: Lovenox     Allergies: Review of patient's allergies indicates:  No Known Allergies  Sedation Hx: have not been any systemic reactions    Labs:    Recent Labs  Lab 04/25/18  0832   INR 1.2       Recent Labs  Lab 04/30/18  0600   WBC 10.54   HGB 8.0*   HCT 26.2*   MCV 81*   *      Recent Labs  Lab 04/25/18  0832  04/30/18  0600     < > 83   *  < > 136   K 3.5  < > 3.3*   *  < > 107   CO2 27  < > 23   BUN 39*  < > 14   CREATININE 0.8  < > 0.6   CALCIUM 8.4*  < > 7.5*   MG 2.0  --   --    ALT 20  --   --     AST 17  --   --    ALBUMIN 1.9*  --   --    BILITOT 0.6  --   --    BILIDIR 0.4*  --   --    < > = values in this interval not displayed.      Vitals:  Temp: 97.9 °F (36.6 °C) (04/30/18 1146)  Pulse: 74 (04/30/18 1146)  Resp: 18 (04/30/18 0803)  BP: 122/61 (04/30/18 1146)  SpO2: (!) 93 % (04/30/18 1146)     Physical Exam:  ASA: 2  Mallampati: 3    General: no acute distress  Mental Status: alert and oriented to person, place and time  HEENT: normocephalic, atraumatic  Chest: unlabored breathing  Heart: regular heart rate  Abdomen: nondistended  Extremity: moves all extremities    Plan: G tube placement  Sedation Plan: moderate    Shady Reyes MD  Radiology

## 2018-04-30 NOTE — PLAN OF CARE
Problem: Patient Care Overview  Goal: Plan of Care Review  Outcome: Ongoing (interventions implemented as appropriate)  Patient A&O x 4. Vitals stable, SpO2 97% on 1L NC.  Refused NGT placement for administration of barium--see previous progress note. Plan for PEG placement during the day on Monday. Also refusing Q4H glucose checks. Q2H turn and reposition as needed. Right arm swollen, elevated on pillows. Voiding in purewick. No other complaints or changes overnight.

## 2018-04-30 NOTE — PROCEDURES
Radiology Post-Procedure Note    Pre Op Diagnosis: Prolonged intubation    Post Op Diagnosis:     Procedure: Gastrostomy tube placement.     Procedure performed by:Kacy.     Written Informed Consent Obtained: Yes    Specimen Removed: NO    Estimated Blood Loss: Minimal    Findings:     Patient presented to IR for percutaneous gastrostomy tube placement. Stomach inflated through NG tube and percutaneously. Cone beam CT used for localization purposes. 18 Fr gastrostomy tube inserted under fluoroscopic guidance. The patient tolerated procedure well and there were no complications. Please see procedure report under Imaging for further details.    Sy Stephens M.D.  Radiology, PGY-V  [phone]

## 2018-04-30 NOTE — PT/OT/SLP PROGRESS
Occupational Therapy      Patient Name:  Selma Lux MD   MRN:  [id number]    Patient not seen today secondary to not feeling well and very fatigued. Will follow-up per schedule. PEG placement planned for today.    AYAD Pederson  4/30/2018

## 2018-04-30 NOTE — PT/OT/SLP PROGRESS
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 female admitted with a medical diagnosis of Pneumonia of both lungs due to infectious organism.  She presents with the following impairments/functional limitations:  weakness, impaired endurance, impaired balance, gait instability, decreased lower extremity function, decreased safety awareness, pain, decreased coordination, impaired cardiopulmonary response to activity. Pt with incr fatigue and lack of motivation to perform OOB mobility sec to upcoming PEG placement. Performed in bed exercises fairly. Pt remains appropriate for continued skilled services and discharge to postacute location to address the below deficits and improve pt return to PLOF.        Rehab Prognosis:  Good; patient would benefit from acute skilled PT services to address these deficits and reach maximum level of function.      Recent Surgery: * No surgery found *      Plan:     During this hospitalization, patient to be seen 5 x/week to address the above listed problems via gait training, therapeutic activities, therapeutic exercises, neuromuscular re-education  · Plan of Care Expires:  05/12/18   Plan of Care Reviewed with: patient    Subjective     Communicated with nsg prior to session.  Patient found supine in bed upon PT entry to room, agreeable to treatment.      Chief Complaint: "I just want to lay here" per pt during session  Patient comments/goals: to rest prior to PEG placement  Pain/Comfort:  · Pain Rating 1: 0/10    Patients cultural, spiritual, Temple conflicts given the current situation: None stated    Objective:     Patient found with: telemetry     General Precautions: Standard, fall   Orthopedic Precautions:N/A   Braces: N/A "     Functional Mobility:  · Bed Mobility:     · Supine to Sit: total assistance  · Sit to Supine: total assistance    Pt sat EOB for 5 minutes with MOD A and incr L lateral trunk lean. Returned to supine position due to incr fatigue.     AM-PAC 6 CLICK MOBILITY  Turning over in bed (including adjusting bedclothes, sheets and blankets)?: 2  Sitting down on and standing up from a chair with arms (e.g., wheelchair, bedside commode, etc.): 1  Moving from lying on back to sitting on the side of the bed?: 2  Moving to and from a bed to a chair (including a wheelchair)?: 1  Need to walk in hospital room?: 1  Climbing 3-5 steps with a railing?: 1  Total Score: 8       Therapeutic Activities and Exercises:   THERAPEUTIC EXERCISE  Pt performed therapeutic exercise supine for 15 reps (B) LE and (B) UE   - strengthening: hip flexion, hip abd/add, AP, hip IR/ER      Patient left supine with all lines intact and call button in reach..    GOALS:    Physical Therapy Goals        Problem: Physical Therapy Goal    Goal Priority Disciplines Outcome Goal Variances Interventions   Physical Therapy Goal     PT/OT, PT Ongoing (interventions implemented as appropriate)     Description:  Goals to be met by: 18     Patient will increase functional independence with mobility by performin. Supine to sit with Moderate Assistance - not met  2. Sit to supine with Moderate Assistance - not met  3. Sit to stand transfer with Moderate Assistance - not met  4. Bed to chair transfer with Moderate Assistance using LRAD  5. Gait  x 20 feet with Moderate Assistance using LRAD    6. Ascend/descend 5 stair with bilateral Handrails Moderate Assistance using Single-point Cane .   7. Lower extremity exercise program x20 reps per handout, with supervision                       Time Tracking:     PT Received On: 18  PT Start Time: 955     PT Stop Time: 1007  PT Total Time (min): 12 min     Billable Minutes: Therapeutic Activity 12    Treatment  Type: Treatment  PT/PTA: PT     PTA Visit Number: 1     Candi Ram, PT  04/30/2018

## 2018-04-30 NOTE — PLAN OF CARE
Problem: Patient Care Overview  Goal: Plan of Care Review  Outcome: Ongoing (interventions implemented as appropriate)  Pt. NPO except meds this shift. NGT placed to Left nare; verified by x-ray and auscultation prior to use. Meds given through NGT once verified. Barium was not given prior to PEG placement; once order was verified, nurse in IR called for pt report and said it would be no use to administer the barium at that point.  Pt. Transferred back to room after peg placement in stable condition. PEG site CDI; no bleeding. NGT in place to left nare at 65cm. Purewick in place. Pt. Resting supine, HOB flat in bed as pt requested. Free of falls or injuries. No PRN's given. Pt. Resting in bed. Daughter at bedside. JACKSON OLIVEROS. Safety maintained. Call light within reach. WCTM.

## 2018-04-30 NOTE — NURSING TRANSFER
Nursing Transfer Note      4/30/2018     Transfer To: OR    Transfer via stretcher    Transfer with 1L to O2    Transported by pt escort    Medicines sent: n/a    Chart send with patient: Yes    Notified: daughter

## 2018-04-30 NOTE — PROGRESS NOTES
"Barium for PEG placement ordered at 0000 and 0100. Called IM 4 to discuss order. Order for NGT to be placed so patient can receive barium. Patient refusing NGT placement, stating "I was told by several doctors today that I didn't need the tube put back in. They didn't tell me about it and I just want to sleep". Reviewed need for barium and NGT placement with patient. Patient replied, "I am a doctor, I know what a NG tube is and I don't need it".     Communicated patient's refusal to Dr. Casillas (IM 4). Plan to leave NGT out and hold barium until day team can address. Patient verbalized understanding of plan.   "

## 2018-04-30 NOTE — PROGRESS NOTES
Pt arrived to ROCU bed 2 for 1 hour post Gastrostomy tube placement recovery. Report received from MARILYN Lynn. Pt denies pain/discomfort. Dressing CDI. VSS. No acute events. See flow sheets for post procedure monitoring.

## 2018-04-30 NOTE — PHARMACY MED REC
"Admission Medication Reconciliation - Pharmacy Consult Note    The home medication history was taken by Desiree Palumbo, Pharmacy Tech.  Based on information gathered and subsequent review by the clinical pharmacist, the items below may need attention.     You may go to "Admission" then "Reconcile Home Medications" tabs to review and/or act upon these items. Based on information gathered and subsequent review by the clinical pharmacist, the items below may need attention.    Potentially problematic discrepancies with current MAR  o Patient IS taking the following which was not ordered upon admit  o Gabapentin 300 mg tid  o Sertraline 25 mg daily      Please address this information as you see fit.  Feel free to contact us if you have any questions or require assistance.    Bri MasseyD, [facility]  Internal Medicine Clinical Pharmacy Specialist  Spectra link: [phone]      Patient's prior to admission medication regimen was as follows:  Medication Dose Route Frequency    onabotulinumtoxina injection 200 Units  200 Units Intramuscular Q90 Days     Medication Sig    adalimumab (HUMIRA PEN) PnKt injection Inject 0.8 mLs (40 mg total) into the skin every 14 (fourteen) days.    albuterol 90 mcg/actuation inhaler Inhale 2 puffs into the lungs every 6 (six) hours as needed for Wheezing or Shortness of Breath.    amlodipine (NORVASC) 5 MG tablet TAKE 1 TABLET DAILY    baclofen (LIORESAL) 10 MG tablet Take 1 tablet (10 mg total) by mouth 3 (three) times daily.    benzonatate (TESSALON) 200 MG capsule Take 1 capsule (200 mg total) by mouth 3 (three) times daily as needed for Cough.    clopidogrel (PLAVIX) 75 mg tablet Take 1 tablet (75 mg total) by mouth once daily.    gabapentin (NEURONTIN) 300 MG capsule Take 1-2 capsules (300-600 mg total) by mouth 3 (three) times daily.    hydroxychloroquine (PLAQUENIL) 200 mg tablet TAKE 2 TABLETS ONCE DAILY    linaclotide (LINZESS) 145 mcg Cap capsule Take 1 capsule (145 mcg total) " by mouth once daily.    montelukast (SINGULAIR) 10 mg tablet TAKE 1 TABLET EVERY EVENING    omeprazole (PRILOSEC) 20 MG capsule Take 1 capsule (20 mg total) by mouth once daily.    predniSONE (DELTASONE) 5 MG tablet TAKE 1 TABLET ONCE DAILY    sertraline (ZOLOFT) 25 MG tablet Take 25 mg by mouth once daily.    diazePAM (VALIUM) 2 MG tablet Take 1 tablet (2 mg total) by mouth every evening.    ferrous sulfate 325 (65 FE) MG EC tablet Take 325 mg by mouth once daily.     hydrocodone-acetaminophen 5-325mg (NORCO)  Take 1 tablet by mouth every 24 hours as needed for Pain.    omega 3-dha-epa-fish oil 250-350-1,000 mg Cap Take 1 capsule by mouth 2 (two) times daily.    thiamine 100 MG tablet Take 1 tablet (100 mg total) by mouth once daily.         .    .

## 2018-04-30 NOTE — PLAN OF CARE
Problem: SLP Goal  Goal: SLP Goal  Speech Therapy Short Term Goals  Goal expected to be met by 5/2  1. Patient will complete dysphagia therapy exercises x10 with min cues.    Speech Language Pathology Goals  Goals expected to be met by 4/30 1. Patient will participate in ongoing swallow assessment       Outcome: Ongoing (interventions implemented as appropriate)  Pt was seen for ST session.     Teri Michael MS, CCC-SLP  Speech Language Pathologist  Pager: [phone]  4/30/2018

## 2018-04-30 NOTE — PLAN OF CARE
Pt expected to have PEG placement later today; pt has been accepted to [facility] for SNF pending a feeding source.     04/30/18 8174   Discharge Reassessment   Assessment Type Discharge Planning Reassessment   Do you have any problems affording any of your prescribed medications? No   Discharge Plan A Skilled Nursing Facility   Discharge Plan B Home with family;Home Health   Patient choice form signed by patient/caregiver No   Can the patient answer the patient profile reliably? Yes, cognitively intact   How does the patient rate their overall health at the present time? Fair   Describe the patient's ability to walk at the present time. Major restrictions/daily assistance from another person   How often would a person be available to care for the patient? Often   Number of comorbid conditions (as recorded on the chart) Three

## 2018-05-01 ENCOUNTER — EXTERNAL CHRONIC CARE MANAGEMENT (OUTPATIENT)
Dept: PRIMARY CARE CLINIC | Facility: CLINIC | Age: 83
End: 2018-05-01
Payer: MEDICARE

## 2018-05-01 ENCOUNTER — OUTPATIENT CASE MANAGEMENT (OUTPATIENT)
Dept: ADMINISTRATIVE | Facility: OTHER | Age: 83
End: 2018-05-01

## 2018-05-01 LAB
POCT GLUCOSE: 119 MG/DL (ref 70–110)
POCT GLUCOSE: 141 MG/DL (ref 70–110)
POCT GLUCOSE: 158 MG/DL (ref 70–110)
POCT GLUCOSE: 170 MG/DL (ref 70–110)
POCT GLUCOSE: 99 MG/DL (ref 70–110)

## 2018-05-01 PROCEDURE — 25000003 PHARM REV CODE 250: Performed by: RADIOLOGY

## 2018-05-01 PROCEDURE — 94668 MNPJ CHEST WALL SBSQ: CPT

## 2018-05-01 PROCEDURE — 25000003 PHARM REV CODE 250: Performed by: HOSPITALIST

## 2018-05-01 PROCEDURE — 63600175 PHARM REV CODE 636 W HCPCS: Performed by: HOSPITALIST

## 2018-05-01 PROCEDURE — 99232 SBSQ HOSP IP/OBS MODERATE 35: CPT | Mod: GC,,, | Performed by: HOSPITALIST

## 2018-05-01 PROCEDURE — 92526 ORAL FUNCTION THERAPY: CPT

## 2018-05-01 PROCEDURE — 97803 MED NUTRITION INDIV SUBSEQ: CPT

## 2018-05-01 PROCEDURE — 20600001 HC STEP DOWN PRIVATE ROOM

## 2018-05-01 PROCEDURE — 25000003 PHARM REV CODE 250: Performed by: STUDENT IN AN ORGANIZED HEALTH CARE EDUCATION/TRAINING PROGRAM

## 2018-05-01 RX ORDER — METOPROLOL TARTRATE 25 MG/1
12.5 TABLET, FILM COATED ORAL 2 TIMES DAILY
Qty: 30 TABLET | Refills: 11
Start: 2018-05-01 | End: 2018-07-11 | Stop reason: SDUPTHER

## 2018-05-01 RX ORDER — PANTOPRAZOLE SODIUM 40 MG/1
40 FOR SUSPENSION ORAL DAILY
Status: DISCONTINUED | OUTPATIENT
Start: 2018-05-01 | End: 2018-05-02 | Stop reason: HOSPADM

## 2018-05-01 RX ORDER — AMLODIPINE BESYLATE 10 MG/1
10 TABLET ORAL DAILY
Status: DISCONTINUED | OUTPATIENT
Start: 2018-05-01 | End: 2018-05-02 | Stop reason: HOSPADM

## 2018-05-01 RX ORDER — HYDROXYCHLOROQUINE SULFATE 200 MG/1
400 TABLET, FILM COATED ORAL DAILY
Qty: 180 TABLET | Refills: 1
Start: 2018-05-01 | End: 2018-09-28 | Stop reason: SDUPTHER

## 2018-05-01 RX ORDER — DEXTROSE MONOHYDRATE 50 MG/ML
INJECTION, SOLUTION INTRAVENOUS CONTINUOUS
Status: ACTIVE | OUTPATIENT
Start: 2018-05-01 | End: 2018-05-01

## 2018-05-01 RX ORDER — SULFAMETHOXAZOLE AND TRIMETHOPRIM 200; 40 MG/5ML; MG/5ML
20 SUSPENSION ORAL
Status: DISCONTINUED | OUTPATIENT
Start: 2018-05-02 | End: 2018-05-02 | Stop reason: HOSPADM

## 2018-05-01 RX ORDER — RAMELTEON 8 MG/1
8 TABLET ORAL NIGHTLY PRN
Status: DISCONTINUED | OUTPATIENT
Start: 2018-05-01 | End: 2018-05-02 | Stop reason: HOSPADM

## 2018-05-01 RX ORDER — HYDROCODONE BITARTRATE AND ACETAMINOPHEN 5; 325 MG/1; MG/1
1 TABLET ORAL EVERY 8 HOURS PRN
Qty: 30 TABLET | Refills: 0
Start: 2018-05-01 | End: 2018-05-02

## 2018-05-01 RX ORDER — PREDNISONE 20 MG/1
60 TABLET ORAL DAILY
Status: DISCONTINUED | OUTPATIENT
Start: 2018-05-01 | End: 2018-05-02 | Stop reason: HOSPADM

## 2018-05-01 RX ORDER — PREDNISONE 20 MG/1
60 TABLET ORAL DAILY
Qty: 30 TABLET | Refills: 0
Start: 2018-05-01 | End: 2018-05-11

## 2018-05-01 RX ORDER — HYDROCODONE BITARTRATE AND ACETAMINOPHEN 5; 325 MG/1; MG/1
1 TABLET ORAL EVERY 4 HOURS PRN
Status: DISCONTINUED | OUTPATIENT
Start: 2018-05-01 | End: 2018-05-01

## 2018-05-01 RX ORDER — HYDROCODONE BITARTRATE AND ACETAMINOPHEN 5; 325 MG/1; MG/1
1 TABLET ORAL EVERY 4 HOURS PRN
Status: DISCONTINUED | OUTPATIENT
Start: 2018-05-01 | End: 2018-05-02 | Stop reason: HOSPADM

## 2018-05-01 RX ORDER — AMLODIPINE BESYLATE 5 MG/1
10 TABLET ORAL DAILY
Qty: 90 TABLET | Refills: 2
Start: 2018-05-01 | End: 2018-05-02

## 2018-05-01 RX ORDER — LANOLIN ALCOHOL/MO/W.PET/CERES
100 CREAM (GRAM) TOPICAL DAILY
COMMUNITY
Start: 2018-05-01 | End: 2018-06-29

## 2018-05-01 RX ORDER — METOPROLOL TARTRATE 25 MG/1
12.5 TABLET ORAL 2 TIMES DAILY
Status: DISCONTINUED | OUTPATIENT
Start: 2018-05-01 | End: 2018-05-02 | Stop reason: HOSPADM

## 2018-05-01 RX ORDER — CLOPIDOGREL BISULFATE 75 MG/1
75 TABLET ORAL DAILY
Qty: 90 TABLET | Refills: 1 | Status: ON HOLD
Start: 2018-05-01 | End: 2018-10-25

## 2018-05-01 RX ORDER — HYDROXYCHLOROQUINE SULFATE 200 MG/1
400 TABLET, FILM COATED ORAL DAILY
Status: DISCONTINUED | OUTPATIENT
Start: 2018-05-01 | End: 2018-05-02 | Stop reason: HOSPADM

## 2018-05-01 RX ORDER — PANTOPRAZOLE SODIUM 40 MG/1
40 FOR SUSPENSION ORAL DAILY
Qty: 30 PACKET | Refills: 11
Start: 2018-05-01 | End: 2018-12-06

## 2018-05-01 RX ORDER — ACETAMINOPHEN 325 MG/1
650 TABLET ORAL EVERY 8 HOURS PRN
Status: DISCONTINUED | OUTPATIENT
Start: 2018-05-01 | End: 2018-05-02 | Stop reason: HOSPADM

## 2018-05-01 RX ORDER — SULFAMETHOXAZOLE AND TRIMETHOPRIM 200; 40 MG/5ML; MG/5ML
20 SUSPENSION ORAL
Start: 2018-05-02 | End: 2018-05-02

## 2018-05-01 RX ADMIN — Medication 12.5 MG: at 10:05

## 2018-05-01 RX ADMIN — ENOXAPARIN SODIUM 40 MG: 100 INJECTION SUBCUTANEOUS at 06:05

## 2018-05-01 RX ADMIN — HYDROCODONE BITARTRATE AND ACETAMINOPHEN 1 TABLET: 5; 325 TABLET ORAL at 03:05

## 2018-05-01 RX ADMIN — HYDROXYCHLOROQUINE SULFATE 400 MG: 200 TABLET, FILM COATED ORAL at 01:05

## 2018-05-01 RX ADMIN — HYDROCODONE BITARTRATE AND ACETAMINOPHEN 1 TABLET: 5; 325 TABLET ORAL at 05:05

## 2018-05-01 RX ADMIN — Medication 12.5 MG: at 09:05

## 2018-05-01 RX ADMIN — INSULIN ASPART 1 UNITS: 100 INJECTION, SOLUTION INTRAVENOUS; SUBCUTANEOUS at 12:05

## 2018-05-01 RX ADMIN — PREDNISONE 60 MG: 20 TABLET ORAL at 10:05

## 2018-05-01 RX ADMIN — DEXTROSE: 5 SOLUTION INTRAVENOUS at 03:05

## 2018-05-01 RX ADMIN — PANTOPRAZOLE SODIUM 40 MG: 40 GRANULE, DELAYED RELEASE ORAL at 10:05

## 2018-05-01 RX ADMIN — AMLODIPINE BESYLATE 10 MG: 10 TABLET ORAL at 10:05

## 2018-05-01 RX ADMIN — HYDROCODONE BITARTRATE AND ACETAMINOPHEN 1 TABLET: 5; 325 TABLET ORAL at 09:05

## 2018-05-01 NOTE — PLAN OF CARE
[facility]     Department of Hospital Medicine     [address] [place] [address]     [phone] after hours  [phone] fax       NURSING HOME ORDERS    05/02/2018    Admit to Nursing Home:    Skilled Bed                                                  Diagnoses:  Active Hospital Problems    Diagnosis  POA    *Pneumonia of both lungs due to infectious organism [J18.9]  Yes    Oropharyngeal dysphagia [R13.12]  Yes    Acute hypoxemic respiratory failure [J96.01]  Yes    History of stroke [Z86.73]  Not Applicable    Vascular dementia [F01.50]  Yes    Long term (current) use of systemic steroids [Z79.52]  Not Applicable    Long QT interval [R94.31]  Yes     Due to medication (plaquenil)       Hypertension, essential [I10]  Yes    Hypovitaminosis D [E55.9]  Yes    Seropositive rheumatoid arthritis of multiple sites [M05.79]  Yes    PUD (peptic ulcer disease) [K27.9]  Yes    Iron deficiency anemia secondary to inadequate dietary iron intake [D50.8]  Yes      Resolved Hospital Problems    Diagnosis Date Resolved POA    Hypokalemia [E87.6] 04/26/2018 Yes    Cough [R05] 04/24/2018 Yes    Dyspnea [R06.00] 04/24/2018 Yes    Atrial fibrillation with RVR [I48.91] 04/22/2018 No    Hypernatremia [E87.0] 04/28/2018 No    Metabolic alkalosis [E87.3] 04/22/2018 No    KERMIT (acute kidney injury) [N17.9] 04/20/2018 No    Hyperkalemia [E87.5] 04/18/2018 No    On tube feeding diet [Z78.9] 04/19/2018 No    Hyperglycemia [R73.9] 04/24/2018 No    Encephalopathy, metabolic [G93.41] 04/23/2018 No    Septic shock [A41.9, R65.21] 04/18/2018 No    Acute respiratory failure with hypoxia [J96.01] 04/11/2018 Yes       Patient is homebound due to:  Pneumonia of both lungs due to infectious organism    Allergies:Review of patient's allergies indicates:  No Known Allergies    Vitals:       Every shift (Skilled Nursing patients)    Diet: NPO     Tube Feeding- via PEG tube       - Continuous Isosource 1.5 ( or equivalent) @ 40 mL/hr     - Flush tube with 200 mL water every 8 hours    Acitivities:     - Up in a chair each morning as tolerated   - Ambulate with assistance to bathroom   - Scheduled walks once each shift (every 8 hours)      LABS:  Per facility protocol    Nursing Precautions:   - Aspiration precautions:             - Total assistance with meals            -  Upright 90 degrees befor during and after meals             -  Suction at bedside          - Fall precautions per nursing home protocol    - Decubitus precautions:        -  for positioning   - Pressure reducing foam mattress   - Turn patient every two hours. Use wedge pillows to anchor patient    CONSULTS:    Physical Therapy to evaluate and treat     Occupational Therapy to evaluate and treat     Speech Therapy  to evaluate and treat     Nutrition to evaluate and recommend diet       MISCELLANEOUS CARE:       PEG Care:  Clean site every 24 hours         Routine Skin for Bedridden Patients:  Apply moisture barrier cream to all    skin folds and wet areas in perineal area daily and after baths and                           all bowel movements.        Medications: Discontinue all previous medication orders, if any. See new list below.     Current Discharge Medication List      START taking these medications    Details   metoprolol tartrate (LOPRESSOR) 25 MG tablet 0.5 tablets (12.5 mg total) by Per G Tube route 2 (two) times daily.  Qty: 30 tablet, Refills: 11      pantoprazole (PROTONIX) 40 mg GrPS 1 packet (40 mg total) by Per G Tube route once daily.  Qty: 30 packet, Refills: 11      sulfamethoxazole-trimethoprim 200-40 mg/5 ml (BACTRIM,SEPTRA) 200-40 mg/5 mL Susp  Maintenance Drug 20 mLs by Per G Tube route every Mon, Wed, Fri.         CONTINUE these medications which have CHANGED    Details   amLODIPine (NORVASC) 5 MG tablet 2 tablets (10 mg total) by Per G Tube route once daily.  Qty: 90 tablet, Refills: 2     Associated Diagnoses: Essential hypertension      clopidogrel (PLAVIX) 75 mg tablet 1 tablet (75 mg total) by Per G Tube route once daily.  Qty: 90 tablet, Refills: 1      hydrocodone-acetaminophen 5-325mg (NORCO) 5-325 mg per tablet 1 tablet by Per G Tube route every 8 (eight) hours as needed for Pain (severe pain).  Qty: 30 tablet, Refills: 0      hydroxychloroquine (PLAQUENIL) 200 mg tablet 2 tablets (400 mg total) by Per G Tube route once daily.  Qty: 180 tablet, Refills: 1      predniSONE (DELTASONE) 20 MG tablet 3 tablets (60 mg total) by Per G Tube route once daily.  Qty: 30 tablet, Refills: 0      thiamine 100 MG tablet 1 tablet (100 mg total) by Per G Tube route once daily.         CONTINUE these medications which have NOT CHANGED    Details   albuterol 90 mcg/actuation inhaler Inhale 2 puffs into the lungs every 6 (six) hours as needed for Wheezing or Shortness of Breath.  Qty: 1 Inhaler, Refills: 11         STOP taking these medications       adalimumab (HUMIRA PEN) PnKt injection Comments:   Reason for Stopping:         baclofen (LIORESAL) 10 MG tablet Comments:   Reason for Stopping:         benzonatate (TESSALON) 200 MG capsule Comments:   Reason for Stopping:         gabapentin (NEURONTIN) 300 MG capsule Comments:   Reason for Stopping:         linaclotide (LINZESS) 145 mcg Cap capsule Comments:   Reason for Stopping:         montelukast (SINGULAIR) 10 mg tablet Comments:   Reason for Stopping:         omeprazole (PRILOSEC) 20 MG capsule Comments:   Reason for Stopping:         sertraline (ZOLOFT) 25 MG tablet Comments:   Reason for Stopping:         diazePAM (VALIUM) 2 MG tablet Comments:   Reason for Stopping:         ferrous sulfate 325 (65 FE) MG EC tablet Comments:   Reason for Stopping:         omega 3-dha-epa-fish oil 250-350-1,000 mg Cap Comments:   Reason for Stopping:                       _________________________________  Rashad Ramsey MD  05/02/2018

## 2018-05-01 NOTE — PLAN OF CARE
Problem: Patient Care Overview  Goal: Plan of Care Review  No acute changes overnight. Pt alert and oriented. C/o pain to peg site (see previous note). NGT to L nare remains in place. Feedings and water boluses held per MD order. Meds given through peg with small flushes. Glucose monitoring performed q 4 hrs. No other issues noted. Possible d/c today. WCTM.

## 2018-05-01 NOTE — ASSESSMENT & PLAN NOTE
--Suspect secondary to severe bilateral pneumonia in the setting of immunosuppression; possibly  vs reaction from Humira   --Severely worsened over first week of admission despite CAP treatment.  --s/p emergent intubation on 4/11 upon MICU admission for severe hypoxemia and severe respiratory distress.  --Bronchoscopy on 4/11 with wash; culture with few WBCs, no organisms.  --Antibiotics broadened to vancomycin, zosyn, bactrim, and posaconazole initally; completed Rx  --Blood, sputum, and urine cultures negative from 4/10 and 4/18  --KOH prep negative, AFB stain with no acid fast bacilli noted, fungal culture negative  --Aspergillus, Histoplasma, and Cryptococcal antigen in blood is negative. Legionella and blasomyces negative.   --Extubated on 4/22  - PO pred 60 qd for possibility of , duo nebs PRN for wheezing  - On Bactrim for PCP PPx currently  - stepped down to hospital medicine 04/25/18  - on NC at 2 -1 L  - monitor and wean O2 as tolerated  - incentive spirometry

## 2018-05-01 NOTE — PROGRESS NOTES
"  [facility]  Adult Nutrition  Consult Note    SUMMARY     Recommendations    1. When medically feasible, resume enteral nutrition via PEG.    - Blood sugars now WNL. Recommend Isosource 1.5 @ 40 mL/hr to provide 1440 calories, 65 g of protein, 733 mL fluid.   2. If diabetic formula still warranted, resume Glucerna 1.5 @ 40 mL/hr to meet 100% pt's estimated needs.   3. RD to monitor & follow-up.    Goals: Meet % EEN, EPN  Nutrition Goal Status: progressing towards goal  Communication of RD Recs: reviewed with RN    Reason for Assessment    Reason for Assessment: RD follow-up  Diagnosis: other (see comments) (respiratory failure)  Relevant Medical History: RA, anemia, arthritis, HTN, stroke, hasimoto's  Interdisciplinary Rounds: attended    General Information Comments: S/p PEG placement 4/30. NGT still present. Pt remains NPO, per ST recommendations.  Nutrition Discharge Planning: Tolerance of TF    Nutrition/Diet History    Patient Reported Diet/Restrictions/Preferences: other (see comments) (KERRY)  Typical Food/Fluid Intake: Pt remains NPO.  Food Preferences: No Spiritism/cultural food preferences at this time  Do you have any cultural, spiritual, Spiritism conflicts, given your current situation?: None stated  Factors Affecting Nutritional Intake: NPO, difficulty/impaired swallowing    Anthropometrics    Temp: 98.3 °F (36.8 °C)  Height Method: Stated  Height: 5' 5" (165.1 cm)  Height (inches): 65 in  Weight Method: Bed Scale  Weight: 71.5 kg (157 lb 10.1 oz)  Weight (lb): 157.63 lb  Ideal Body Weight (IBW), Female: 125 lb  % Ideal Body Weight, Female (lb): 126.1 lb  BMI (Calculated): 26.3  BMI Grade: 25 - 29.9 - overweight    Lab/Procedures/Meds    Pertinent Labs Reviewed: reviewed  Pertinent Labs Comments: A1C 5.5  Pertinent Medications Reviewed: reviewed  Pertinent Medications Comments: Prednisone    Physical Findings/Assessment    Overall Physical Appearance: nourished  Tubes: " gastrostomy tube, nasogastric tube  Oral/Mouth Cavity: WDL  Skin: incision(s)    Estimated/Assessed Needs    Weight Used For Calorie Calculations: 68 kg (149 lb 14.6 oz) (Dosing wt)  Energy Calorie Requirements (kcal): 1438 kcal/d  Energy Need Method: Noble-St Jeor (1.25 PAL)     Protein Requirements: 68-82 g/d (1-1.2 g/kg)  Weight Used For Protein Calculations: 68 kg (149 lb 14.6 oz)     Fluid Requirements (mL): 1mL/kcal or per MD    Nutrition Prescription Ordered    Current Diet Order: NPO    Nutrition Risk    Level of Risk/Frequency of Follow-up: moderate     Assessment and Plan    Nutrition Problem  Inadequate energy intake     Related to (etiology):   TF provision     Signs and Symptoms (as evidenced by):   Pt receiving <85% EEN and EPN.      Nutrition Diagnosis Status:   Continues      Monitor and Evaluation    Food and Nutrient Intake: enteral nutrition intake  Food and Nutrient Adminstration: enteral and parenteral nutrition administration  Physical Activity and Function: nutrition-related ADLs and IADLs  Anthropometric Measurements: weight, weight change  Biochemical Data, Medical Tests and Procedures: inflammatory profile, lipid profile, glucose/endocrine profile, gastrointestinal profile, electrolyte and renal panel  Nutrition-Focused Physical Findings: overall appearance     Nutrition Follow-Up    RD Follow-up?: Yes

## 2018-05-01 NOTE — PROGRESS NOTES
Please note the following patient was not enrolled into [facility] at this time. Patient is currently inpatient. Please send new referral upon discharge to home.     Please contact [facility] at ext. [phone] with any questions.    Thank you,    Lilliana SANDERS CCM

## 2018-05-01 NOTE — PLAN OF CARE
Problem: SLP Goal  Goal: SLP Goal  Speech Therapy Short Term Goals  Goal expected to be met by 5/2  1. Patient will complete dysphagia therapy exercises x10 with min cues.    Speech Language Pathology Goals  Goals expected to be met by 4/30  1. Patient will participate in ongoing swallow assessment       Outcome: Ongoing (interventions implemented as appropriate)  Pt seen for dysphagia therapy exercises this PM. Pt's POC remains appropriate.    LENORA Serna., CF-SLP  Speech-Language Pathologist

## 2018-05-01 NOTE — SUBJECTIVE & OBJECTIVE
Interval History:     PEG placed on 4/30, tolerated well. Using PEG for TFs this PM, meds via PEG. Pending transition to SNF on 5/2    Review of Systems   Constitutional: Negative for chills, fever and unexpected weight change.   HENT: Negative for congestion and sinus pressure.    Eyes: Negative for visual disturbance.   Respiratory: Negative for shortness of breath and wheezing.    Cardiovascular: Negative for chest pain.   Gastrointestinal: Negative for abdominal pain, constipation, diarrhea, nausea and vomiting.   Genitourinary: Negative for difficulty urinating, dysuria and hematuria.   Musculoskeletal: Negative for arthralgias.   Skin: Negative for rash.   Allergic/Immunologic: Positive for immunocompromised state.   Neurological: Positive for weakness. Negative for dizziness and seizures.        Diffuse weakness    Hematological: Negative for adenopathy. Does not bruise/bleed easily.   Psychiatric/Behavioral: Negative for confusion.      amLODIPine  10 mg Per G Tube Daily    enoxaparin  40 mg Subcutaneous Daily    hydroxychloroquine  400 mg Per G Tube Daily    metoprolol tartrate  12.5 mg Per G Tube BID    pantoprazole  40 mg Per G Tube Daily    predniSONE  60 mg Per G Tube Daily    [START ON 5/2/2018] sulfamethoxazole-trimethoprim 200-40 mg/5 ml  20 mL Per G Tube Every Mon, Wed, Fri    tuberculin  5 Units Intradermal Once       Objective:     Vital Signs (Most Recent):  Temp: 98.6 °F (37 °C) (05/01/18 1535)  Pulse: 65 (05/01/18 1535)  Resp: 16 (05/01/18 1535)  BP: (!) 115/57 (05/01/18 1535)  SpO2: (!) 94 % (05/01/18 1535) Vital Signs (24h Range):  Temp:  [97.5 °F (36.4 °C)-98.6 °F (37 °C)] 98.6 °F (37 °C)  Pulse:  [60-76] 65  Resp:  [16-26] 16  SpO2:  [93 %-100 %] 94 %  BP: (115-155)/(47-72) 115/57     Weight: 71.5 kg (157 lb 10.1 oz)  Body mass index is 26.23 kg/m².    Intake/Output Summary (Last 24 hours) at 05/01/18 1611  Last data filed at 05/01/18 1300   Gross per 24 hour   Intake               600 ml   Output             1500 ml   Net             -900 ml      Physical Exam   Constitutional: She is oriented to person, place, and time. She appears well-developed and well-nourished. No distress.   HENT:   Head: Normocephalic and atraumatic.   Eyes: EOM are normal. Pupils are equal, round, and reactive to light.   Neck: No tracheal deviation present. No thyromegaly present.   Cardiovascular: Normal rate.    No murmur heard.  Pulmonary/Chest: Effort normal. No respiratory distress. She has no wheezes. She has rales.   Crackles at lung bases, improved with cough   Abdominal: Soft. Bowel sounds are normal. She exhibits no distension. There is no tenderness. There is no guarding.   PEG in place, no bleeding, no purulence   Musculoskeletal: She exhibits edema (BLLE 1+). She exhibits no deformity.   Neurological: She is alert and oriented to person, place, and time. No cranial nerve deficit.   Skin: Skin is warm and dry. No rash noted. She is not diaphoretic. No erythema.   Psychiatric: She has a normal mood and affect.   Vitals reviewed.      Significant Labs:   CBC:   Recent Labs  Lab 04/30/18  0600   WBC 10.54   HGB 8.0*   HCT 26.2*   *     CMP:   Recent Labs  Lab 04/30/18  0600      K 3.3*      CO2 23   GLU 83   BUN 14   CREATININE 0.6   CALCIUM 7.5*   ANIONGAP 6*   EGFRNONAA >60.0

## 2018-05-01 NOTE — ASSESSMENT & PLAN NOTE
--Continue hydroxychloroquine 400 daily - had to be stopped at unable to crush, now reordered  --started prednisone 60mg PO QD.  -- would need to plan to wean down on steroids post hospitalization

## 2018-05-01 NOTE — ASSESSMENT & PLAN NOTE
- Chronic and stable, without significant neuro deficits   - restart Plavix 75mg PO daily - post PED placement

## 2018-05-01 NOTE — PLAN OF CARE
Problem: Patient Care Overview  Goal: Plan of Care Review  Outcome: Ongoing (interventions implemented as appropriate)  Patient reports minimal amount of pain from PEG site. Dressing to site intact. NG tube removed without difficulty. Tube feedings to start to PEG tube at 1700. Starting tube feeding at 10cc/hr. To be increased by 10 cc per hour every 4 hours as tolerated to a goal of 40cc/hr. Probable discharge to SNF tomorrow.

## 2018-05-01 NOTE — ASSESSMENT & PLAN NOTE
- likely 2/2 prolonged intubation - 11 days  - SLP following  - failed MBSS 4/25  - PEG placed on 05/01  - TFs and meds via PEG

## 2018-05-01 NOTE — PROGRESS NOTES
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 She was seen by her PCP on 4/2 for these complaints and received a prescription for duo nebs and tessalon perles. CXR performed on 4/2 noted bilateral basilar infiltrates. At that time, she denied fever, chills, sputum production, sick contacts. She was admitted to  and treated emprically for CAP with rocephin and azithromycin. She was also diuresed daily. However, her oxygen requirements slowly increased from 2 L NC to 4 L NC with also progressively worsening bilateral infiltrates on imaging. CTA performed on 4/9 negative for PE, however noted significant bilateral consolidations. On 4/11, her hypoxemia continued to worsen, requiring NRB mask, and she developed severe respiratory distress. She was transferred to the MICU and emergently intubated. Repeat CT chest performed on 4/11 demonstrated progressive worsening of bilateral peripheral infiltrates.    Hospital Course:  Admitted to MICU on 4/11, intubated emergently for severe hypoxemia and respiratory distress. ID was consulted for assistance in management given immunocompromised state. Patient paralyzed with nimbex following persistent dysynchrony with vent despite sedation. Abx broadened to vancomycin, zosyn,  bactrim, and posaconazole. Norepinephrine initiated for BP support. Bronchoscopy performed at bedside on 4/11 and all cultures including fungal cultures negative. Nimbex discontinued on 4/12. She continued to require significant oxygen support thereafter, and was unable to wean oxygen requirements significantly since intubation. However, she has made slow improvements in her oxygenation since 4/18. She went into A-fib RVR in 120-160's on the evening of 4/17, and was briefly started on amiodarone gtt; this discontinued and metoprolol added with good rate control on 4/18. SAT/SBT continue daily with difficulty weaning her off fentanyl as she gets agitated tachycardic and hypertensive ('s). Valium initiated in effort to wean fentanyl off. Patient extubated on 4/22 to nasal cannula  4/24/2018 - Pt with chills/cold overnight & refusing meds, willing to take meds this morning. Hill placed this AM with good UOP. Pt still NPO per swallowing trial. Free water deficit 2.8L, starting with 500mL D5, will reassess, Pt oriented x3 this morning. Re-evaluate swallowing.  04/25/2018: Stepped down to hospital medicine. No acute events overnight, patient to have swallow evaluation today. Respiratory status stable, on 2L NC.   04/27/2018: No acute events overnight, patient continues to fail swallow studies. Will place NG tube today for nutrition and consult placed for general surgery for PEG placement.   04/28/2018: NG placed yesterday and now receiving tube feeds. Plan for PEG placement on Monday per interventional radiology. No acute events overnight.   04/29/2018: Patient removed NG yesterday afternoon, does not want it replaced. Plan for PEG placement tomorrow. No acute events overnight.   04/30/2018: Patient refused placement of NG tube required for PEG placement overnight, spoke to IR and attempting to have to replaced so that procedure can be performed (required for barium swallow). Otherwise no acute events overnight.    5/1/2018; PEG placed on 4/30, tolerated well. Using PEG for TFs this PM, meds via PEG. Pending transition to SNF on 5/2    Interval History:     PEG placed on 4/30, tolerated well. Using PEG for TFs this PM, meds via PEG. Pending transition to SNF on 5/2    Review of Systems   Constitutional: Negative for chills, fever and unexpected weight change.   HENT: Negative for congestion and sinus pressure.    Eyes: Negative for visual disturbance.   Respiratory: Negative for shortness of breath and wheezing.    Cardiovascular: Negative for chest pain.   Gastrointestinal: Negative for abdominal pain, constipation, diarrhea, nausea and vomiting.   Genitourinary: Negative for difficulty urinating, dysuria and hematuria.   Musculoskeletal: Negative for arthralgias.   Skin: Negative for rash.   Allergic/Immunologic: Positive for immunocompromised state.   Neurological: Positive for weakness. Negative for dizziness and seizures.        Diffuse weakness    Hematological: Negative for adenopathy. Does not bruise/bleed easily.   Psychiatric/Behavioral: Negative for confusion.      amLODIPine  10 mg Per G Tube Daily    enoxaparin  40 mg Subcutaneous Daily    hydroxychloroquine  400 mg Per G Tube Daily    metoprolol tartrate  12.5 mg Per G Tube BID    pantoprazole  40 mg Per G Tube Daily    predniSONE  60 mg Per G Tube Daily    [START ON 5/2/2018] sulfamethoxazole-trimethoprim 200-40 mg/5 ml  20 mL Per G Tube Every Mon, Wed, Fri    tuberculin  5 Units Intradermal Once       Objective:     Vital Signs (Most Recent):  Temp: 98.6 °F (37 °C) (05/01/18 1535)  Pulse: 65 (05/01/18 1535)  Resp: 16 (05/01/18 1535)  BP: (!) 115/57 (05/01/18 1535)  SpO2: (!) 94 % (05/01/18 1535) Vital Signs (24h Range):  Temp:  [97.5 °F (36.4 °C)-98.6 °F (37 °C)] 98.6 °F (37 °C)  Pulse:  [60-76] 65  Resp:  [16-26] 16  SpO2:  [93 %-100 %] 94 %  BP: (115-155)/(47-72) 115/57     Weight: 71.5 kg (157 lb 10.1 oz)  Body mass index is 26.23  "kg/m².    Intake/Output Summary (Last 24 hours) at 05/01/18 1611  Last data filed at 05/01/18 1300   Gross per 24 hour   Intake              600 ml   Output             1500 ml   Net             -900 ml      Physical Exam   Constitutional: She is oriented to person, place, and time. She appears well-developed and well-nourished. No distress.   HENT:   Head: Normocephalic and atraumatic.   Eyes: EOM are normal. Pupils are equal, round, and reactive to light.   Neck: No tracheal deviation present. No thyromegaly present.   Cardiovascular: Normal rate.    No murmur heard.  Pulmonary/Chest: Effort normal. No respiratory distress. She has no wheezes. She has rales.   Crackles at lung bases, improved with cough   Abdominal: Soft. Bowel sounds are normal. She exhibits no distension. There is no tenderness. There is no guarding.   PEG in place, no bleeding, no purulence   Musculoskeletal: She exhibits edema (BLLE 1+). She exhibits no deformity.   Neurological: She is alert and oriented to person, place, and time. No cranial nerve deficit.   Skin: Skin is warm and dry. No rash noted. She is not diaphoretic. No erythema.   Psychiatric: She has a normal mood and affect.   Vitals reviewed.      Significant Labs:   CBC:   Recent Labs  Lab 04/30/18  0600   WBC 10.54   HGB 8.0*   HCT 26.2*   *     CMP:   Recent Labs  Lab 04/30/18  0600      K 3.3*      CO2 23   GLU 83   BUN 14   CREATININE 0.6   CALCIUM 7.5*   ANIONGAP 6*   EGFRNONAA >60.0     Assessment/Plan:      * Pneumonia of both lungs due to infectious organism    --see "respiratory failure" section  -- CXR 4/25- no significant change          Acute hypoxemic respiratory failure    --Suspect secondary to severe bilateral pneumonia in the setting of immunosuppression; possibly  vs reaction from Humira   --Severely worsened over first week of admission despite CAP treatment.  --s/p emergent intubation on 4/11 upon MICU admission for severe hypoxemia and " severe respiratory distress.  --Bronchoscopy on 4/11 with wash; culture with few WBCs, no organisms.  --Antibiotics broadened to vancomycin, zosyn, bactrim, and posaconazole initally; completed Rx  --Blood, sputum, and urine cultures negative from 4/10 and 4/18  --KOH prep negative, AFB stain with no acid fast bacilli noted, fungal culture negative  --Aspergillus, Histoplasma, and Cryptococcal antigen in blood is negative. Legionella and blasomyces negative.   --Extubated on 4/22  - PO pred 60 qd for possibility of , duo nebs PRN for wheezing  - On Bactrim for PCP PPx currently  - stepped down to hospital medicine 04/25/18  - on NC at 2 -1 L  - monitor and wean O2 as tolerated  - incentive spirometry           Oropharyngeal dysphagia    - likely 2/2 prolonged intubation - 11 days  - SLP following  - failed MBSS 4/25  - PEG placed on 05/01  - TFs and meds via PEG        Seropositive rheumatoid arthritis of multiple sites    --Continue hydroxychloroquine 400 daily - had to be stopped at unable to crush, now reordered  --started prednisone 60mg PO QD.  -- would need to plan to wean down on steroids post hospitalization             Long term (current) use of systemic steroids    --see above          Hypertension, essential    - on amlodipine 10,  Metoprolol 12.5 bid        History of stroke    --Remote lacunar infarcts in the right centrum semi-ovale, basal ganglia, and right tatyana per [facility] w/o contrast on 12/2017.  --restart home clopidogrel.  -- PT/OT          Vascular dementia    - Chronic and stable, without significant neuro deficits   - restart Plavix 75mg PO daily - post PED placement              Long QT interval              Hypovitaminosis D              PUD (peptic ulcer disease)    --Continue PPI          Iron deficiency anemia secondary to inadequate dietary iron intake    - hgb of 8  - monitor            VTE Risk Mitigation         Ordered     enoxaparin injection 40 mg  Daily      04/29/18 6512         Plan to d/c to [facility] on 5/2 with PEG      Nica Luo MD  Department of Hospital Medicine   [facility]

## 2018-05-01 NOTE — ASSESSMENT & PLAN NOTE
--Remote lacunar infarcts in the right centrum semi-ovale, basal ganglia, and right tatyana per [facility] w/o contrast on 12/2017.  --restart home clopidogrel.  -- PT/OT

## 2018-05-01 NOTE — NURSING
"Pt reporting new onset 9/10 "stabbing" pain around new peg site. Vitals stable. Afebrile. IM 4 notified. No new orders given at this time. Pt will be seen in IR in AM. PRN norco administered. WCTM  "

## 2018-05-02 VITALS
HEART RATE: 80 BPM | RESPIRATION RATE: 18 BRPM | HEIGHT: 65 IN | BODY MASS INDEX: 26.26 KG/M2 | SYSTOLIC BLOOD PRESSURE: 106 MMHG | TEMPERATURE: 99 F | WEIGHT: 157.63 LBS | DIASTOLIC BLOOD PRESSURE: 52 MMHG | OXYGEN SATURATION: 92 %

## 2018-05-02 LAB
ANION GAP SERPL CALC-SCNC: 6 MMOL/L
BASOPHILS # BLD AUTO: 0 K/UL
BASOPHILS NFR BLD: 0 %
BUN SERPL-MCNC: 14 MG/DL
CALCIUM SERPL-MCNC: 7.8 MG/DL
CHLORIDE SERPL-SCNC: 107 MMOL/L
CO2 SERPL-SCNC: 22 MMOL/L
CREAT SERPL-MCNC: 0.6 MG/DL
DIFFERENTIAL METHOD: ABNORMAL
EOSINOPHIL # BLD AUTO: 0.1 K/UL
EOSINOPHIL NFR BLD: 1 %
ERYTHROCYTE [DISTWIDTH] IN BLOOD BY AUTOMATED COUNT: 18.9 %
EST. GFR  (AFRICAN AMERICAN): >60 ML/MIN/1.73 M^2
EST. GFR  (NON AFRICAN AMERICAN): >60 ML/MIN/1.73 M^2
GLUCOSE SERPL-MCNC: 105 MG/DL
HCT VFR BLD AUTO: 28.6 %
HGB BLD-MCNC: 8.3 G/DL
IMM GRANULOCYTES # BLD AUTO: 0.04 K/UL
IMM GRANULOCYTES NFR BLD AUTO: 0.4 %
LYMPHOCYTES # BLD AUTO: 1.2 K/UL
LYMPHOCYTES NFR BLD: 11.1 %
MCH RBC QN AUTO: 24.1 PG
MCHC RBC AUTO-ENTMCNC: 29 G/DL
MCV RBC AUTO: 83 FL
MONOCYTES # BLD AUTO: 1.3 K/UL
MONOCYTES NFR BLD: 12.2 %
NEUTROPHILS # BLD AUTO: 7.9 K/UL
NEUTROPHILS NFR BLD: 75.3 %
NRBC BLD-RTO: 0 /100 WBC
PLATELET # BLD AUTO: 102 K/UL
PMV BLD AUTO: 10.9 FL
POCT GLUCOSE: 131 MG/DL (ref 70–110)
POTASSIUM SERPL-SCNC: 4 MMOL/L
RBC # BLD AUTO: 3.44 M/UL
SODIUM SERPL-SCNC: 135 MMOL/L
WBC # BLD AUTO: 10.47 K/UL

## 2018-05-02 PROCEDURE — 36415 COLL VENOUS BLD VENIPUNCTURE: CPT

## 2018-05-02 PROCEDURE — 25000003 PHARM REV CODE 250: Performed by: HOSPITALIST

## 2018-05-02 PROCEDURE — 86580 TB INTRADERMAL TEST: CPT | Performed by: HOSPITALIST

## 2018-05-02 PROCEDURE — 94761 N-INVAS EAR/PLS OXIMETRY MLT: CPT

## 2018-05-02 PROCEDURE — 80048 BASIC METABOLIC PNL TOTAL CA: CPT

## 2018-05-02 PROCEDURE — 99900035 HC TECH TIME PER 15 MIN (STAT)

## 2018-05-02 PROCEDURE — 63600175 PHARM REV CODE 636 W HCPCS: Performed by: HOSPITALIST

## 2018-05-02 PROCEDURE — 85025 COMPLETE CBC W/AUTO DIFF WBC: CPT

## 2018-05-02 PROCEDURE — 99238 HOSP IP/OBS DSCHRG MGMT 30/<: CPT | Mod: GC,,, | Performed by: HOSPITALIST

## 2018-05-02 RX ORDER — HYDROCODONE BITARTRATE AND ACETAMINOPHEN 5; 325 MG/1; MG/1
1 TABLET ORAL EVERY 8 HOURS PRN
Qty: 30 TABLET | Refills: 0 | Status: SHIPPED | OUTPATIENT
Start: 2018-05-02 | End: 2018-09-28

## 2018-05-02 RX ORDER — SULFAMETHOXAZOLE AND TRIMETHOPRIM 200; 40 MG/5ML; MG/5ML
20 SUSPENSION ORAL
Start: 2018-05-02 | End: 2018-06-29

## 2018-05-02 RX ORDER — AMLODIPINE BESYLATE 5 MG/1
10 TABLET ORAL DAILY
Qty: 90 TABLET | Refills: 2
Start: 2018-05-02 | End: 2018-06-29 | Stop reason: SDUPTHER

## 2018-05-02 RX ADMIN — HYDROXYCHLOROQUINE SULFATE 400 MG: 200 TABLET, FILM COATED ORAL at 10:05

## 2018-05-02 RX ADMIN — PANTOPRAZOLE SODIUM 40 MG: 40 GRANULE, DELAYED RELEASE ORAL at 10:05

## 2018-05-02 RX ADMIN — SULFAMETHOXAZOLE AND TRIMETHOPRIM 20 ML: 200; 40 SUSPENSION ORAL at 12:05

## 2018-05-02 RX ADMIN — Medication 12.5 MG: at 10:05

## 2018-05-02 RX ADMIN — HYDROCODONE BITARTRATE AND ACETAMINOPHEN 1 TABLET: 5; 325 TABLET ORAL at 10:05

## 2018-05-02 RX ADMIN — Medication 5 UNITS: at 12:05

## 2018-05-02 RX ADMIN — PREDNISONE 60 MG: 20 TABLET ORAL at 10:05

## 2018-05-02 NOTE — PLAN OF CARE
EBONIE spoke w/Codi, admission coordinator for [facility] # [phone], who has requested the following documents: PPD, PARSS/142, last 14 days of MARS, results of swallow study, and SNF orders.  EBONIE has send SNF orders along w/clinicals via [facility] system.    11:30am -  EBONIE spoke w/nurse Mira [id number], requesting PPD/CXR; request for PPD was placed on 4/25/18 but PPD was not done.  EBONIE will f/u w/nurse.  Pt is scheduled to dc today.    11:39am - Codi, [facility], confirmed receipt of clinicals.    12:00 noon - EBONIE spoke w/Dr. Ramsey, resident for [facility], to inform him of corrections (per [facility]) needed on orders: change diet to NPO, Isosource 1.5 or equivalent, change Norvasc/Norco change to per peg plus a script is needed, and Bactrim needs an end date or say maintenance drug.       2:10pm - Corrections to SNF orders have been completed.      3:11pm - Transportation have been arranged w/[facility] for stretcher transport to [facility].  Pt has been assigned a room on the 1st flr.  nurse Mira [id number], will call report to [phone] and will hand off to assigned nurse.  ETA for transportation is 4:00pm.    kanika Cristobal is agreeable to transfer and notified of time of transportation arrival.    Morris Underwood, [facility]  Ext. [phone]

## 2018-05-02 NOTE — PLAN OF CARE
Problem: Patient Care Overview  Goal: Plan of Care Review  No acute changes overnight. Pt denied any pain other then some tenderness around peg site. TF titrated up to goal rate of 40 cc/hr. Pt tolerated well with no residuals. Water boluses initiated. No BM noted on shift. No other issues noted. Possible d/c today. WCTM.

## 2018-05-02 NOTE — PT/OT/SLP PROGRESS
Physical Therapy      Patient Name:  Selma Lux MD   MRN:  [id number]    Patient not seen today secondary to pt in care of nursing first attempt (meds/etc.), and pt being discharged second attempt. Discharge summary to follow. No charges made.     Alyssa Starks, PTA

## 2018-05-02 NOTE — NURSING
Report given to Cornelio Francis LPN at [facility] Awaiting [facility] stretcher to transport.

## 2018-05-02 NOTE — DISCHARGE SUMMARY
[facility] Hospital Medicine  Discharge Summary      Patient Name: Selma Rosado MD Jose J  MRN: [id number]  Admission Date: 4/5/2018  Hospital Length of Stay: 27 days  Discharge Date and Time:  05/02/2018 11:18 AM  Attending Physician: Lonnie Tobias MD   Discharging Provider: Rashad Ramsey MD  Primary Care Provider: Bhargav Hirsch MD    Hospital Medicine Team: [facility] [place] [facility] 4 Rashad Ramsey MD    HPI: Dr. Lux is a 83 yo female with history significant for CVA, RA on plaquenil/prednisone and recently started on Humiria (3/22), HTN, and large goiter who [facility] [place] [facility] with complaints of continually worsening shortness of breath, cough, and fatigue that started about 2 weeks prior. She was seen by her PCP on 4/2 for these complaints and received a prescription for duo nebs and tessalon perles. CXR performed on 4/2 noted bilateral basilar infiltrates. At that time, she denied fever, chills, sputum production, sick contacts. She was admitted to  and treated emprically for CAP with rocephin and azithromycin. She was also diuresed daily. However, her oxygen requirements slowly increased from 2 L NC to 4 L NC with also progressively worsening bilateral infiltrates on imaging. CTA performed on 4/9 negative for PE, however noted significant bilateral consolidations. On 4/11, her hypoxemia continued to worsen, requiring NRB mask, and she developed severe respiratory distress. She was transferred to the MICU and emergently intubated. Repeat CT chest performed on 4/11 demonstrated progressive worsening of bilateral peripheral infiltrates.    * No surgery found *      Hospital Course: Admitted to MICU on 4/11, intubated emergently for severe hypoxemia and respiratory distress. ID was consulted for assistance in management given immunocompromised state. Patient paralyzed with nimbex following persistent dysynchrony with vent despite sedation. Abx broadened to  vancomycin, zosyn, bactrim, and posaconazole. Norepinephrine initiated for BP support. Bronchoscopy performed at bedside on 4/11 and all cultures including fungal cultures negative. Nimbex discontinued on 4/12. She continued to require significant oxygen support thereafter, and was unable to wean oxygen requirements significantly since intubation. However, she has made slow improvements in her oxygenation since 4/18. She went into A-fib RVR in 120-160's on the evening of 4/17, and was briefly started on amiodarone gtt; this discontinued and metoprolol added with good rate control on 4/18. SAT/SBT continue daily with difficulty weaning her off fentanyl as she gets agitated tachycardic and hypertensive ('s). Valium initiated in effort to wean fentanyl off. Patient extubated on 4/22 to nasal cannula. Patient was stepped down to hospital medicine on 04/24/18. Patient had hypernatremia on step down that was slowly correct with D5 gtt. The patient was also noted to have increased edema of her right arm, this was worked up with ultrasound and was negative for DVT. Patient continued to have difficulty swallowing after evaluation by speech therapy. Plan was discussed and PEG was placed to aid in her nutritional needs while she continues to work on swallowing exercises. The PEG was placed without adverse event and at time of discharge the patient was tolerating tube feeds at goal.     Physical Exam   Constitutional: She is oriented to person, place, and time. She appears well-developed and well-nourished. No distress.   HENT:   Head: Normocephalic and atraumatic.   Eyes: EOM are normal. Pupils are equal, round, and reactive to light. No scleral icterus.   Neck: Normal range of motion. Neck supple. No JVD present.   Cardiovascular: Normal rate, regular rhythm and normal heart sounds.    Pulmonary/Chest: Effort normal and breath sounds normal.   Abdominal: Soft. Bowel sounds are normal. She exhibits no distension and no  mass. There is tenderness. There is no guarding.   PEG in place, c/d/i    Musculoskeletal: She exhibits edema. She exhibits no tenderness.   Neurological: She is alert and oriented to person, place, and time.   Skin: Skin is warm and dry. She is not diaphoretic.   Psychiatric: She has a normal mood and affect. Her behavior is normal.   Vitals reviewed.           Consults:   Consults         Status Ordering Provider     Inpatient consult to Infectious Diseases  Once     Provider:  (Not yet assigned)    Completed CONNIE FRANKLIN     Inpatient consult to Interventional Radiology  Once     Provider:  (Not yet assigned)    Completed ARNOLDO QUINTANA     Inpatient consult to Midline team  Once     Provider:  (Not yet assigned)    Completed LUIS BARBER     Inpatient consult to Nephrology  Once     Provider:  (Not yet assigned)    Completed PHILIPP LORD     Inpatient consult to Registered Dietitian/Nutritionist  Once     Provider:  (Not yet assigned)    Completed PENELOPE DILL     Inpatient consult to Rheumatology  Once     Provider:  (Not yet assigned)    Completed CONNIE FRANKLIN     Inpatient consult to [facility]  Once     Provider:  (Not yet assigned)    Acknowledged CHIKIS WAGNER          Final Active Diagnoses:    Diagnosis Date Noted POA    PRINCIPAL PROBLEM:  Pneumonia of both lungs due to infectious organism [J18.9] 04/11/2018 Yes    Oropharyngeal dysphagia [R13.12] 04/26/2018 Yes    Acute hypoxemic respiratory failure [J96.01] 04/11/2018 Yes    History of stroke [Z86.73] 12/12/2017 Not Applicable    Vascular dementia [F01.50] 12/06/2017 Yes    Long term (current) use of systemic steroids [Z79.52] 02/23/2017 Not Applicable    Long QT interval [R94.31] 06/29/2016 Yes    Hypertension, essential [I10] 06/16/2016 Yes    Hypovitaminosis D [E55.9] 02/03/2016 Yes    Seropositive rheumatoid arthritis of multiple sites [M05.79] 10/21/2015 Yes    PUD (peptic ulcer disease)  [K27.9] 03/11/2014 Yes    Iron deficiency anemia secondary to inadequate dietary iron intake [D50.8] 06/24/2013 Yes      Problems Resolved During this Admission:    Diagnosis Date Noted Date Resolved POA    Hypokalemia [E87.6] 04/24/2018 04/26/2018 Yes    Cough [R05] 04/23/2018 04/24/2018 Yes    Dyspnea [R06.00] 04/23/2018 04/24/2018 Yes    Atrial fibrillation with RVR [I48.91] 04/18/2018 04/22/2018 No    Hypernatremia [E87.0] 04/18/2018 04/28/2018 No    Metabolic alkalosis [E87.3] 04/18/2018 04/22/2018 No    KERMIT (acute kidney injury) [N17.9] 04/15/2018 04/20/2018 No    Hyperkalemia [E87.5] 04/15/2018 04/18/2018 No    On tube feeding diet [Z78.9] 04/13/2018 04/19/2018 No    Hyperglycemia [R73.9] 04/12/2018 04/24/2018 No    Encephalopathy, metabolic [G93.41] 04/11/2018 04/23/2018 No    Septic shock [A41.9, R65.21] 04/11/2018 04/18/2018 No    Acute respiratory failure with hypoxia [J96.01] 04/05/2018 04/11/2018 Yes      Discharged Condition: stable    Disposition: Skilled Nursing Facility    Patient Instructions:     Ambulatory referral to Outpatient Case Management   Referral Priority: Routine Referral Type: Consultation   Referral Reason: Specialty Services Required    Number of Visits Requested: 1      Ambulatory referral to Outpatient Case Management   Referral Priority: Routine Referral Type: Consultation   Referral Reason: Specialty Services Required    Number of Visits Requested: 1        Medications:  Reconciled Home Medications:      Medication List      START taking these medications    metoprolol tartrate 25 MG tablet  Commonly known as:  LOPRESSOR  0.5 tablets (12.5 mg total) by Per G Tube route 2 (two) times daily.     pantoprazole 40 mg Grps  Commonly known as:  PROTONIX  1 packet (40 mg total) by Per G Tube route once daily.     sulfamethoxazole-trimethoprim 200-40 mg/5 ml 200-40 mg/5 mL Susp  Commonly known as:  BACTRIM,SEPTRA  20 mLs by Per G Tube route every Mon, Wed, Fri.        CHANGE  how you take these medications    amLODIPine 5 MG tablet  Commonly known as:  NORVASC  Take 2 tablets (10 mg total) by mouth once daily.  What changed:  See the new instructions.     clopidogrel 75 mg tablet  Commonly known as:  PLAVIX  1 tablet (75 mg total) by Per G Tube route once daily.  What changed:  how to take this     hydrocodone-acetaminophen 5-325mg 5-325 mg per tablet  Commonly known as:  NORCO  Take 1 tablet by mouth every 8 (eight) hours as needed for Pain (severe pain).  What changed:  · when to take this  · reasons to take this     hydroxychloroquine 200 mg tablet  Commonly known as:  PLAQUENIL  2 tablets (400 mg total) by Per G Tube route once daily.  What changed:  See the new instructions.     predniSONE 20 MG tablet  Commonly known as:  DELTASONE  3 tablets (60 mg total) by Per G Tube route once daily.  What changed:  See the new instructions.     thiamine 100 MG tablet  1 tablet (100 mg total) by Per G Tube route once daily.  What changed:  how to take this        CONTINUE taking these medications    albuterol 90 mcg/actuation inhaler  Inhale 2 puffs into the lungs every 6 (six) hours as needed for Wheezing or Shortness of Breath.        STOP taking these medications    adalimumab Pnkt injection  Commonly known as:  HUMIRA PEN     baclofen 10 MG tablet  Commonly known as:  LIORESAL     benzonatate 200 MG capsule  Commonly known as:  TESSALON     diazePAM 2 MG tablet  Commonly known as:  VALIUM     ferrous sulfate 325 (65 FE) MG EC tablet     gabapentin 300 MG capsule  Commonly known as:  NEURONTIN     linaclotide 145 mcg Cap capsule  Commonly known as:  LINZESS     montelukast 10 mg tablet  Commonly known as:  SINGULAIR     omega 3-dha-epa-fish oil 250-350-1,000 mg Cap     omeprazole 20 MG capsule  Commonly known as:  PRILOSEC     sertraline 25 MG tablet  Commonly known as:  ZOLOFT            Significant Diagnostic Studies: Labs:   BMP:   Recent Labs  Lab 05/02/18  0353      *   K  4.0      CO2 22*   BUN 14   CREATININE 0.6   CALCIUM 7.8*    and CBC   Recent Labs  Lab 05/02/18  0353   WBC 10.47   HGB 8.3*   HCT 28.6*   *       Pending Diagnostic Studies:     Procedure Component Value Units Date/Time    Basic metabolic panel [[id number]] Collected:  04/25/18 0832    Order Status:  Sent Lab Status:  In process Updated:  04/25/18 0832    Specimen:  Blood from Blood     Narrative:       Collection has been rescheduled by JE at 4/25/2018 03:49 Reason:   Unable to collect lots of fluid  Collection has been rescheduled by AM at 4/25/2018 06:43 @IF/please   draw all labs @728/Reason: please draw all labs @728/@  Collection has been rescheduled by AM at 4/25/2018 06:44 @IF/please   draw all labs @728 spoke Yana/Reason: please draw all labs @728   spoke [facility]/@  Collection has been rescheduled by [facility] at 4/25/2018 08:05 Reason:   unable to collect. hard stick. nurse Jody franklin notified    Basic metabolic panel [[id number]] Collected:  04/16/18 0559    Order Status:  Sent Lab Status:  No result     Specimen:  Blood from Blood         Indwelling Lines/Drains at time of discharge:   Lines/Drains/Airways     Drain            Female External Urinary Catheter 04/30/18 0644 2 days         Gastrostomy/Enterostomy 04/30/18 1654 Gastrostomy tube w/ balloon LUQ feeding 1 day                Time spent on the discharge of patient: 30 minutes  Patient was seen and examined on the date of discharge and determined to be suitable for discharge.         Rashad Ramsey MD  Department of Hospital Medicine  [facility]

## 2018-05-02 NOTE — PLAN OF CARE
SW right faxed updated nursing home orders to [facility].    Morris Underwood [facility] is calling [facility]'s to confirm they have the orders.

## 2018-05-03 ENCOUNTER — OUTPATIENT CASE MANAGEMENT (OUTPATIENT)
Dept: ADMINISTRATIVE | Facility: OTHER | Age: 83
End: 2018-05-03

## 2018-05-03 ENCOUNTER — PATIENT OUTREACH (OUTPATIENT)
Dept: ADMINISTRATIVE | Facility: CLINIC | Age: 83
End: 2018-05-03

## 2018-05-03 NOTE — PLAN OF CARE
Per Codi, admissions for [facility], the Modified Barium Swallow Study, is needed. EBONIE has faxed study to [phone].    Morris Underwood LMSW  Ext. [phone]

## 2018-05-03 NOTE — PROGRESS NOTES
Please note that this patient was not enrolled in Outpatient Case Management at this time due to being transferred to a SNF facility.     Please contact [facility] at Ext. [phone] with questions.    Thank you,    Diana Valentin, [facility]  Outpatient Complex Care Mgmt  Ext [phone]

## 2018-05-03 NOTE — PLAN OF CARE
Pt transferred to skilled facility - [facility].     05/03/18 1537   Final Note   Assessment Type Final Discharge Note   Discharge Disposition SNF   What phone number can be called within the next 1-3 days to see how you are doing after discharge? [phone]   Hospital Follow Up  Appt(s) scheduled? Yes   Right Care Referral Info   Post Acute Recommendation SNF / Sub-Acute Rehab   Referral Type skilled facility   Facility Name [facility]

## 2018-05-11 ENCOUNTER — TELEPHONE (OUTPATIENT)
Dept: NEUROLOGY | Facility: CLINIC | Age: 83
End: 2018-05-11

## 2018-05-11 NOTE — TELEPHONE ENCOUNTER
----- Message from Gurmeet Pedro sent at 5/11/2018  4:36 PM CDT -----  Contact: Rosy ( daughter ) @ [phone]  Caller is requesting a return call from Dr Giang and Dr Tobias about pt's long term treatment, pls call

## 2018-05-14 ENCOUNTER — TELEPHONE (OUTPATIENT)
Dept: NEUROLOGY | Facility: CLINIC | Age: 83
End: 2018-05-14

## 2018-05-14 ENCOUNTER — TELEPHONE (OUTPATIENT)
Dept: SPEECH THERAPY | Facility: HOSPITAL | Age: 83
End: 2018-05-14

## 2018-05-14 DIAGNOSIS — R47.02 DYSPHASIA: ICD-10-CM

## 2018-05-14 DIAGNOSIS — R47.9 SPEECH DISTURBANCE, UNSPECIFIED TYPE: Primary | ICD-10-CM

## 2018-05-14 NOTE — TELEPHONE ENCOUNTER
Called pt daughter regarding message from Friday regarding her mother long term care. No detail message was give so that I may refer it to Dr. Giang. She would rather have discuss her mother treatment with the provider himself. Informed pt daughter that the provider is out of town mon-wed- and will return thursday.informed pt daughter that I will notify Dr. Giang.

## 2018-05-14 NOTE — TELEPHONE ENCOUNTER
----- Message from Gurmeet Pedro sent at 5/11/2018  4:36 PM CDT -----  Contact: Rosy ( daughter ) @ [phone]  Caller is requesting a return call from Dr Giang and Dr Tobias about pt's long term treatment, pls call

## 2018-05-15 LAB — FUNGUS SPEC CULT: NORMAL

## 2018-05-22 NOTE — PT/OT/SLP DISCHARGE
Physical Therapy Discharge Summary    Name: Selma Alonzo Lux MD  MRN: [id number]   Principal Problem: Pneumonia of both lungs due to infectious organism     Patient Discharged from acute Physical Therapy on 2018.  Please refer to prior PT noted date on 2018 for functional status.     Assessment:     Patient appropriate for care in another setting.    Objective:     GOALS:    Physical Therapy Goals        Problem: Physical Therapy Goal    Goal Priority Disciplines Outcome Goal Variances Interventions   Physical Therapy Goal     PT/OT, PT Ongoing (interventions implemented as appropriate)     Description:  Goals to be met by: 18     Patient will increase functional independence with mobility by performin. Supine to sit with Moderate Assistance - not met  2. Sit to supine with Moderate Assistance - not met  3. Sit to stand transfer with Moderate Assistance - not met  4. Bed to chair transfer with Moderate Assistance using LRAD  5. Gait  x 20 feet with Moderate Assistance using LRAD    6. Ascend/descend 5 stair with bilateral Handrails Moderate Assistance using Single-point Cane .   7. Lower extremity exercise program x20 reps per handout, with supervision                       Reasons for Discontinuation of Therapy Services  Transfer to alternate level of care. and Satisfactory goal achievement.      Plan:     Patient Discharged to: Skilled Nursing Facility.    Candi Ram, PT, DPT  2018

## 2018-05-31 PROCEDURE — 99490 CHRNC CARE MGMT STAFF 1ST 20: CPT | Mod: S$PBB,,, | Performed by: FAMILY MEDICINE

## 2018-05-31 PROCEDURE — 99490 CHRNC CARE MGMT STAFF 1ST 20: CPT | Mod: PBBFAC | Performed by: FAMILY MEDICINE

## 2018-06-06 ENCOUNTER — CLINICAL SUPPORT (OUTPATIENT)
Dept: SPEECH THERAPY | Facility: HOSPITAL | Age: 83
End: 2018-06-06
Payer: MEDICARE

## 2018-06-06 ENCOUNTER — HOSPITAL ENCOUNTER (OUTPATIENT)
Dept: RADIOLOGY | Facility: HOSPITAL | Age: 83
Discharge: HOME | End: 2018-06-06
Attending: INTERNAL MEDICINE
Payer: MEDICARE

## 2018-06-06 DIAGNOSIS — R47.9 SPEECH DISTURBANCE, UNSPECIFIED TYPE: ICD-10-CM

## 2018-06-06 DIAGNOSIS — Z87.01 HISTORY OF ASPIRATION PNEUMONIA: ICD-10-CM

## 2018-06-06 DIAGNOSIS — R13.12 DYSPHAGIA, OROPHARYNGEAL: Primary | ICD-10-CM

## 2018-06-06 DIAGNOSIS — Z86.73 HISTORY OF CVA IN ADULTHOOD: ICD-10-CM

## 2018-06-06 PROCEDURE — 74230 X-RAY XM SWLNG FUNCJ C+: CPT | Mod: TC

## 2018-06-06 PROCEDURE — G8998 SWALLOW D/C STATUS: HCPCS | Mod: GN,CJ | Performed by: SPEECH-LANGUAGE PATHOLOGIST

## 2018-06-06 PROCEDURE — G8997 SWALLOW GOAL STATUS: HCPCS | Mod: GN,CJ | Performed by: SPEECH-LANGUAGE PATHOLOGIST

## 2018-06-06 PROCEDURE — G8996 SWALLOW CURRENT STATUS: HCPCS | Mod: GN,CJ | Performed by: SPEECH-LANGUAGE PATHOLOGIST

## 2018-06-06 PROCEDURE — 92611 MOTION FLUOROSCOPY/SWALLOW: CPT | Mod: GN | Performed by: SPEECH-LANGUAGE PATHOLOGIST

## 2018-06-06 PROCEDURE — 74230 X-RAY XM SWLNG FUNCJ C+: CPT | Mod: 26,,, | Performed by: RADIOLOGY

## 2018-06-06 NOTE — PLAN OF CARE
IMPRESSIONS:   This 82 y.o. woman appears to present with  1.  Mild oropharyngeal dysphagia characterized by need for increased time to chew solids in absence of dentures/mandibular molars and persisting but mild pharyngeal musculature (BOT, PPW) weakness resulting in incomplete laryngeal vestibule closure affecting consecutive cup sips of thin liquids only (flash penetration) and mild pharyngeal residue.  Appeared markedly improved as compared to her previous MBSS 4/25/18.  2.  History pneumonia suspicious for relation to aspiration.  3.  History CVA and vascular dementia.    Swallowing  Current status:  FCM:  LEVEL 5 (20-39% impaired)   - CJ  Projected status:  FCM:   LEVEL 5 (20-39% impaired)   - CJ  Discharge status:  FCM:   LEVEL 5 (20-39% impaired)   -  CJ       RECOMMENDATIONS/PLAN OF CARE:   It is felt that Dr. Lux would benefit from  1.  Resumption of a regular consistency diet as tolerated with thin liquids using the following strategies and common aspiration precautions, including, but not limited to   A.  Appropriate seating for all eating and drinking.   B.  Head-neutral position for swallowing.   C.  Single cup sips and small sips.   D.  1/2 teaspoon boluses of foods.   E.  Alternating liquids and solids.   F.  Monitoring for any signs/symptoms of aspiration (such as wet/gurgly voice that does not clear with coughing, inability to make any voice sounds, any persistent coughing with oral intake, otherwise unexplained fever, unexplained increased or new difficulty or discomfort breathing, unexplained increase in sleepiness/lethargy/significant fatigue, unexplained increase or new onset confusion or change in cognitive functioning, or any other unexplained change in health or well-being that could be related to swallowing).  2.  Continue dysphagia exercises with home program to continue to strengthen her swallowing mechanism and improve functioning.  3.  Follow physician's directives re:  timing protocol for removal of PEG.  Per today's study results, see no impediment to use of full PO diet and discontinuing PEG.  4.  Repeat MBSS as needed.

## 2018-06-06 NOTE — PROGRESS NOTES
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 Raicel Zamora for a repeat Modified Barium Swallow Study to rule out aspiration, assess her overall swallowing function, and determine safest consistencies for oral intake.  She was unaccompanied.    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  She was admitted via the ED on 4/5/18 and intubated emergently 4/11/18 for severe hypoxemia and respiratory distress.  Repeat CT showed progressive worsening of bilateral peripheral infiltrates.  She had a MBSS 4/25/18 which noted diagnosis of "Pneumonia of both upper lobes due to infectious organism."  Study findings stated, "severe Oropharyngeal Dysphagia characterized by impaired oral motor strength and cooridnation, BOT retraction, pharyngeal sensation, pharygneal wall constriction, hyolarygneal excursion, and UES opening resulting in penetration during the swallow of nectar thick liquids cleared with a cued cough and severe pharyngeal stasis which she is unable to adequately clear despite effort placing patient at a high risk to aspirate all po intake."  NPO status was recommended with alternative means of nutrition delivery and a PEG was placed.    Today, Dr. Lux reported that on discharge 5/2/18 she transferred to [facility] where she remains.  There, she worked with  for dysphagia therapy and was advanced to mechanical soft with thin liquids about 1 week ago; she is taking pills with liquid.  She stated that she originally used a chin tuck, but has been advanced to a head-neutral position.  She alternates liquids with solids.  She has not used her PEG in a 1 week and wants it out b/c it is her "only source of pain."     MEDICAL HISTORY:  Past Medical History:   Diagnosis Date    Anemia     Arthritis     Bilateral hand pain 3/14/2018    Branch retinal vein occlusion, left eye " 2/20/2015    Chronic bilateral low back pain without sciatica 3/23/2017    Cognitive communication deficit 12/19/2017    Cystoid macular edema, left eye 2/20/2015    Cystoid macular edema, left eye 2/20/2015    DJD (degenerative joint disease) of cervical spine 5/15/2013    Fatty liver 8/26/2014    Goiter 4/9/2018    Hashimoto's disease     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    Hypertension     IBS (irritable bowel syndrome) 6/21/2017    IGT (impaired glucose tolerance) 1/12/2016    Iron deficiency anemia secondary to inadequate dietary iron intake 6/24/2013    Joint pain     Keratoconjunctivitis sicca of both eyes not specified as Sjogren's 7/29/2016    Leiomyoma of uterus, unspecified 9/16/2014    Long QT interval 6/29/2016    Due to medication (plaquenil)     Macular edema 1/12/2015    Multinodular goiter 1/12/2016    Neuropathy 8/23/2017    Plaquenil causing adverse effect in therapeutic use 10/7/2016    Pneumococcal vaccine refused 8/17/2016    Pneumonia due to Streptococcus pneumoniae 4/5/2018    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/2015    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    s/p Right Total knee replacement 5/15/2013    Scoliosis of thoracic spine 5/15/2013    Small vessel disease, cerebrovascular 12/28/2017    Stroke     Vascular dementia 12/6/2017        SURGICAL HISTORY:  Past Surgical History:   Procedure Laterality Date    CATARACT EXTRACTION      COLONOSCOPY N/A 9/29/2015    Procedure: COLONOSCOPY;  Surgeon: FIDELINA Sanchez MD;  Location: [address];  Service: Endoscopy;  Laterality: N/A;    EYE SURGERY      JOINT REPLACEMENT      right knee    KNEE SURGERY Left 12/31/2013    TKR    left parotidectomy      mixed tumor    SALIVARY GLAND SURGERY      TONSILLECTOMY         SWALLOWING HISTORY:  As above.  Currently taking mechanical soft with thin liquids and pills with liquids.    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  She currently lives at [facility].    Tobacco:  Never smoker per EMR.  ETOH:  No per EMR.    BEHAVIOR:  Dr. Lux was a very pleasant woman who had normal affect and social interaction.  She was able to fully cooperate during the study.  Results of today's assessment were considered indicative of her current levels of swallowing functioning.      HEARING:  Subjectively, within normal limits.     ORAL PERIPHERAL:   Informal examination of the oral mechanism revealed structures and functioning within normal limits for swallowing and speech purposes.  She is missing her mandibular molars and her dentures no longer fit, so they were not in place.    Voice quality was rough, but not wet before the study.  This did not change during the study. She could not recall how long her voice quality had been rough.  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    TEST FINDINGS:   Dr. Lux was seen in Radiology with the Radiologist for a Modified Barium Swallow Study.  She was seated a wheelchair for a left lateral videofluoroscopic view.      Consistencies assessed using radiopaque barium contrast:  thin (3- and 5-ml boluses via spoon and single and continuous swallows via open cup),   thin puree (1/2 and 3/4) via spoon,   thick puree (1/2) via spoon,   solid (1/2 cracker boluses) by Dr. Lux's hand, and   12.5 mm barium tablet with water.    Strategies:  Small bites - facilitated more complete clearance of boluses  Single sips - more protective of airway with thin liquids.  Alternate liquids with solids - facilitated clearance of any solid residue    Phases:  Oral:  Dr. Lux was able to obtain liquid  and strip utensils well with no loss of material from the oral cavity.  She required increased time to chew solids due to missing molars.  She moved boluses through the oral cavity with appropriate transit time.  There was no pooling of liquids in the mouth.  Swallow reflex was triggered within normal limits.    Pharyngeal:  Boluses moved through the pharyngeal phase with flash laryngeal penetration with sequential swallows of thin liquids only, but no penetration with any other consistency and no aspiration and no nasal regurgitation with any consistency.     - Timely initiation  - Adequate soft palate elevation  - Adequate laryngeal elevation and anterior hyoid excursion  - Adequate to mildly reduced tongue base retraction  - Nearly complete epiglottic inversion; tip appears to abut PPW.  - Incomplete laryngeal vestibular closure; impacted thin liquids only.  - Reduced pharyngeal stripping wave  - Adequate PE segment opening.  -  Minimum pharyngeal residue in BOT and valleculae with pureed foods and large volumes of thin liquid; cleared with additional dry or liquid swallow.  Of note:  She denied sensation of typically adequate BOT residue to sense on the 3/4 teaspoon bolus of thin puree.    Cervical Esophageal:  Boluses entered the upper esophagus within normal limits.  No obstruction was noted.    Rosenbeck 8-point Penetration-Aspiration Scale:  Best:  1 - Material does not enter airway. -- thin liquid in single cup sips, all solids, barium tablet.  Worst:    2 - Material enters the airway, remains above the vocal folds, and is ejected from the airway. -- thin liquids in consecutive swallows      IMPRESSIONS:   This 82 y.o. woman appears to present with  1.  Mild oropharyngeal dysphagia characterized by need for increased time to chew solids in absence of dentures/mandibular molars and persisting but mild pharyngeal musculature (BOT, PPW) weakness resulting in incomplete laryngeal vestibule closure affecting  consecutive cup sips of thin liquids only (flash penetration) and mild pharyngeal residue.  Appeared markedly improved as compared to her previous MBSS 4/25/18.  2.  History pneumonia suspicious for relation to aspiration.  3.  History CVA and vascular dementia.ve    Swallowing  Current status:  FCM:  LEVEL 5 (20-39% impaired)   - CJ  Projected status:  FCM:   LEVEL 5 (20-39% impaired)   -   Discharge status:  FCM:   LEVEL 5 (20-39% impaired)   -         RECOMMENDATIONS/PLAN OF CARE:   It is felt that Dr. Lux would benefit from  1.  Resumption of a regular consistency diet as tolerated with thin liquids using the following strategies and common aspiration precautions, including, but not limited to   A.  Appropriate seating for all eating and drinking.   B.  Head-neutral position for swallowing.   C.  Single cup sips and small sips.   D.  1/2 teaspoon boluses of foods.   E.  Alternating liquids and solids.   F.  Monitoring for any signs/symptoms of aspiration (such as wet/gurgly voice that does not clear with coughing, inability to make any voice sounds, any persistent coughing with oral intake, otherwise unexplained fever, unexplained increased or new difficulty or discomfort breathing, unexplained increase in sleepiness/lethargy/significant fatigue, unexplained increase or new onset confusion or change in cognitive functioning, or any other unexplained change in health or well-being that could be related to swallowing).  2.  Continue dysphagia exercises with home program to continue to strengthen her swallowing mechanism and improve functioning.  3.  Follow physician's directives re: timing protocol for removal of PEG.  Per today's study results, see no impediment to use of full PO diet and discontinuing PEG.  4.  Repeat MBSS as needed.

## 2018-06-13 LAB
ACID FAST MOD KINY STN SPEC: NORMAL
MYCOBACTERIUM SPEC QL CULT: NORMAL

## 2018-06-27 ENCOUNTER — TELEPHONE (OUTPATIENT)
Dept: INTERNAL MEDICINE | Facility: CLINIC | Age: 83
End: 2018-06-27

## 2018-06-27 NOTE — TELEPHONE ENCOUNTER
----- Message from Poly Hutchison sent at 6/27/2018  3:43 PM CDT -----  Contact: Triny/[facility]/[phone]  Calling to see if  will oversee 's home health services. Please advise.      Thanks

## 2018-06-27 NOTE — TELEPHONE ENCOUNTER
Will need to see patient for face-to-face visit to fulfill requriements to establish needs and write orders (if appropriate). Thank you

## 2018-06-27 NOTE — TELEPHONE ENCOUNTER
Spoke with pt and reserved Fri 6/29 11:20am for pt  Pt will called tmr if can't make it and reschedule

## 2018-06-29 ENCOUNTER — OFFICE VISIT (OUTPATIENT)
Dept: INTERNAL MEDICINE | Facility: CLINIC | Age: 83
End: 2018-06-29
Attending: FAMILY MEDICINE
Payer: MEDICARE

## 2018-06-29 ENCOUNTER — TELEPHONE (OUTPATIENT)
Dept: INTERNAL MEDICINE | Facility: CLINIC | Age: 83
End: 2018-06-29

## 2018-06-29 VITALS
DIASTOLIC BLOOD PRESSURE: 74 MMHG | HEART RATE: 92 BPM | BODY MASS INDEX: 24.89 KG/M2 | WEIGHT: 145.81 LBS | HEIGHT: 64 IN | SYSTOLIC BLOOD PRESSURE: 150 MMHG

## 2018-06-29 DIAGNOSIS — R60.9 EDEMA, UNSPECIFIED TYPE: ICD-10-CM

## 2018-06-29 DIAGNOSIS — I70.0 AORTIC ATHEROSCLEROSIS: ICD-10-CM

## 2018-06-29 DIAGNOSIS — M05.79 SEROPOSITIVE RHEUMATOID ARTHRITIS OF MULTIPLE SITES: ICD-10-CM

## 2018-06-29 DIAGNOSIS — I10 HYPERTENSION, ESSENTIAL: ICD-10-CM

## 2018-06-29 DIAGNOSIS — Z86.73 HISTORY OF STROKE: ICD-10-CM

## 2018-06-29 DIAGNOSIS — R35.0 URINARY FREQUENCY: ICD-10-CM

## 2018-06-29 DIAGNOSIS — I48.0 AF (PAROXYSMAL ATRIAL FIBRILLATION): ICD-10-CM

## 2018-06-29 DIAGNOSIS — I10 ESSENTIAL HYPERTENSION: ICD-10-CM

## 2018-06-29 DIAGNOSIS — J96.01 ACUTE HYPOXEMIC RESPIRATORY FAILURE: Primary | ICD-10-CM

## 2018-06-29 PROCEDURE — 99214 OFFICE O/P EST MOD 30 MIN: CPT | Mod: PBBFAC | Performed by: FAMILY MEDICINE

## 2018-06-29 PROCEDURE — 99214 OFFICE O/P EST MOD 30 MIN: CPT | Mod: S$PBB,,, | Performed by: FAMILY MEDICINE

## 2018-06-29 PROCEDURE — 99999 PR PBB SHADOW E&M-EST. PATIENT-LVL IV: CPT | Mod: PBBFAC,,, | Performed by: FAMILY MEDICINE

## 2018-06-29 RX ORDER — LANOLIN ALCOHOL/MO/W.PET/CERES
400 CREAM (GRAM) TOPICAL DAILY
COMMUNITY
End: 2019-05-21 | Stop reason: SDUPTHER

## 2018-06-29 RX ORDER — GABAPENTIN 300 MG/1
300 CAPSULE ORAL 3 TIMES DAILY
COMMUNITY
End: 2018-09-10 | Stop reason: SDUPTHER

## 2018-06-29 RX ORDER — GABAPENTIN 300 MG/1
300 CAPSULE ORAL 3 TIMES DAILY
COMMUNITY
End: 2018-06-29

## 2018-06-29 RX ORDER — AMLODIPINE BESYLATE 10 MG/1
10 TABLET ORAL DAILY
Qty: 90 TABLET | Refills: 0
Start: 2018-06-29 | End: 2018-07-11

## 2018-06-29 RX ORDER — LANOLIN ALCOHOL/MO/W.PET/CERES
100 CREAM (GRAM) TOPICAL DAILY
Status: ON HOLD | COMMUNITY
End: 2018-10-25

## 2018-06-29 RX ORDER — ASPIRIN 325 MG
325 TABLET ORAL DAILY
COMMUNITY
End: 2018-07-13

## 2018-06-29 RX ORDER — FUROSEMIDE 20 MG/1
10 TABLET ORAL DAILY
COMMUNITY
End: 2018-07-11

## 2018-06-29 NOTE — TELEPHONE ENCOUNTER
----- Message from Sharon Renee sent at 6/29/2018 11:37 AM CDT -----  Contact: Triny/ [facility]/[phone]  Triny would like to see if the doctor would over see the home health services.

## 2018-06-29 NOTE — PROGRESS NOTES
Subjective:       Patient ID: Selma Rosado MD Jose J is a 82 y.o. female.    Chief Complaint: Follow-up (requested by )    HPI  Review of Systems   Constitutional: Positive for fatigue. Negative for chills, fever and unexpected weight change.   HENT: Negative for congestion and trouble swallowing.    Eyes: Negative for redness and visual disturbance.   Respiratory: Negative for cough, chest tightness and shortness of breath.    Cardiovascular: Negative for chest pain, palpitations and leg swelling.   Gastrointestinal: Positive for abdominal distention. Negative for abdominal pain and blood in stool.   Genitourinary: Negative for difficulty urinating, hematuria and menstrual problem.   Musculoskeletal: Positive for arthralgias, gait problem, myalgias and neck pain. Negative for back pain and joint swelling.   Skin: Negative for color change and rash.   Neurological: Positive for tremors and speech difficulty. Negative for weakness, numbness and headaches.   Hematological: Negative for adenopathy. Does not bruise/bleed easily.   Psychiatric/Behavioral: Positive for sleep disturbance. Negative for behavioral problems and confusion. The patient is not nervous/anxious.        Objective:      Physical Exam   Constitutional: She is oriented to person, place, and time. She appears well-developed and well-nourished.   HENT:   Head: Normocephalic and atraumatic.   Eyes: Conjunctivae are normal. No scleral icterus.   Neck: Normal range of motion. Neck supple.   Cardiovascular: Normal rate, normal heart sounds and intact distal pulses.  Exam reveals no gallop and no friction rub.    No murmur heard.  ankle edema noted - wearing non-sized hospital stockings   Pulmonary/Chest: Effort normal and breath sounds normal. No respiratory distress. She has no wheezes. She has no rales.   Abdominal: She exhibits no distension and no abdominal bruit. There is no tenderness.   Musculoskeletal: She exhibits no edema or deformity.  "  Neurological: She is alert and oriented to person, place, and time. She displays tremor. No cranial nerve deficit. Coordination and gait abnormal. GCS eye subscore is 4. GCS verbal subscore is 5. GCS motor subscore is 6.   Skin: Skin is warm and dry. No rash noted. She is not diaphoretic. No erythema.   Psychiatric: She has a normal mood and affect. Her behavior is normal. Judgment and thought content normal. She is attentive.   Nursing note and vitals reviewed.      Assessment:       1. Acute hypoxemic respiratory failure    2. Essential hypertension    3. Hypertension, essential    4. History of stroke    5. AF (paroxysmal atrial fibrillation)    6. Edema, unspecified type    7. Seropositive rheumatoid arthritis of multiple sites    8. Urinary frequency    9. Aortic atherosclerosis Continue current regimen       Plan:   Selma was seen today for follow-up.    Diagnoses and all orders for this visit:    Acute hypoxemic respiratory failure  -     CBC Without Differential; Future    Essential hypertension  -     amLODIPine (NORVASC) 10 MG tablet; Take 1 tablet (10 mg total) by mouth once daily.  -     Comprehensive metabolic panel; Future    Hypertension, essential  -     X-Ray Chest PA And Lateral; Future    History of stroke    AF (paroxysmal atrial fibrillation)  -     Ambulatory referral to Cardiology    Edema, unspecified type  -     Ambulatory referral to Cardiology    Seropositive rheumatoid arthritis of multiple sites  -     Ambulatory referral to Rheumatology    Urinary frequency  -     Cancel: Urinalysis Microscopic  -     Cancel: Urinalysis  -     Cancel: Urine culture  -     Urinalysis; Future  -     Urinalysis Microscopic; Future  -     Urine culture; Future    Aortic atherosclerosis  Comments:  CT Chest Abdomen Pelvis-4/11/2018  "Aorta: Normal in course and caliber, with mild atherosclerotic plaque."  Hx stroke, Medications:  Plavix 75 mg        See meds, orders, follow up, routing and instructions " sections of encounter.  An 82-year-old established female patient that is recently discharged from a   skilled nursing facility.  No records are available.  We received an automated   request to sign home health orders which I declined, requested the patient for a   face-to-face visit.  She does have some difficulty with travel now.  She is   quite weak.  Her clinical course involved presumed respiratory infection and her   last visit with me was on 04/02/2018.  She was referred into the hospital for   worsening and she had a prolonged course including ICU care.  PEG tube and   discharged to Skilled Nursing Facility for rehabilitation to overcome her   deconditioning.    Her discharge date was 05/02/2018.  I did review that note and medications on   discharge.  She apparently is taking the same medicines now that she was   discharged upon, unclear whether she had any medical physician supervision in   the Skilled Nursing facility.  She does describe some edema in the ankles and   she describes a history of possible atrial fibrillation during her   hospitalization.  Metoprolol had been added to her medicine regimen and she was   on aspirin and Plavix for history of CVA.    She is currently on a dose of 40 mg of daily prednisone (rheumatoid arthritis)   with a tapering dose recommended.  She had been on approximately 5 or 10 mg   daily prior to her hospitalization admission.  Discharge prescriptions were   written by Susanne Flannery.  This is an outside nurse practitioner and the only   paperwork we have is the individually handwritten prescriptions.    The patient is requesting a consultation to a cardiologist concerning extremity   edema, left greater than right.  I am also requesting consultation with a   rheumatologist.  She would like to reestablish with a new provider.  The patient   was placed on Humira and there was consideration that this resulted in some   sort of a process either infectious (not proven) or  inflammatory that led to her   respiratory distress syndrome.    In terms of her home health orders, she does have significant difficulty getting   out of the home at this time.  She was able to navigate with a walker somewhat   today, but this exhausted her, she had help from two family members.  Therefore,   I think she meets criteria for homebound status and will write home health   orders for occupational therapy, physical therapy three times per week for   approximately six weeks and weekly Skilled Nursing visit to assess for edema,   vital signs and pulmonary status.  Her medication list was updated.  I will   place referrals for Dr. Lonnie Rouse and Dr. Yassine Jeffries.  Continue her current   medications.  We will need to address the prednisone tapering issue in days to   come.      SELENA/HN  dd: 06/29/2018 13:20:06 (CDT)  td: 06/30/2018 11:44:30 (CDT)  Doc ID   #[id number]  Job ID #[id number]    CC:

## 2018-06-30 ENCOUNTER — EXTERNAL CHRONIC CARE MANAGEMENT (OUTPATIENT)
Dept: PRIMARY CARE CLINIC | Facility: CLINIC | Age: 83
End: 2018-06-30
Payer: MEDICARE

## 2018-06-30 PROCEDURE — 99490 CHRNC CARE MGMT STAFF 1ST 20: CPT | Mod: S$PBB,,, | Performed by: FAMILY MEDICINE

## 2018-06-30 PROCEDURE — 99490 CHRNC CARE MGMT STAFF 1ST 20: CPT | Mod: PBBFAC | Performed by: FAMILY MEDICINE

## 2018-07-03 ENCOUNTER — TELEPHONE (OUTPATIENT)
Dept: INTERNAL MEDICINE | Facility: CLINIC | Age: 83
End: 2018-07-03

## 2018-07-03 DIAGNOSIS — J96.01 ACUTE HYPOXEMIC RESPIRATORY FAILURE: ICD-10-CM

## 2018-07-03 DIAGNOSIS — R53.81 PHYSICAL DECONDITIONING: Primary | ICD-10-CM

## 2018-07-03 NOTE — TELEPHONE ENCOUNTER
----- Message from Amador Macdonald sent at 7/3/2018  1:23 PM CDT -----  Contact:  Triny/ [facility]/[phone]  She call to give a fax # [phone]  Triny would like to see if the doctor would over see the home health services. She can take a verbal ok.    Thank you

## 2018-07-09 ENCOUNTER — TELEPHONE (OUTPATIENT)
Dept: RHEUMATOLOGY | Facility: CLINIC | Age: 83
End: 2018-07-09

## 2018-07-09 NOTE — TELEPHONE ENCOUNTER
----- Message from Lonnie Rouse MD sent at 7/5/2018  6:31 PM CDT -----  Can schedule as a NP with anyone else, including a fellow.  AN  ----- Message -----  From: Katerine Nielson LPN  Sent: 7/5/2018   3:08 PM  To: Lonnie Rouse MD    Sir,    Pt did not like the way the hospital consult was and would prefer to see you..    Is this ok?    Thanks, Katerine  ----- Message -----  From: Josh Hernandez MA  Sent: 7/5/2018   2:41 PM  To: Katerine Nielson LPN    Patient did not want to see Dr. BOLAND. What do I do?    Thanks, Katerine  ----- Message -----  From: Lonnie Rouse MD  Sent: 7/4/2018  11:10 AM  To: Olinda Dozier Staff    Patient needs f/u appt with Dr KRYSTIAN NOLAN  ----- Message -----  From: Bhargav Lee MD  Sent: 7/3/2018   1:59 PM  To: Jerod Jeffries MD, MD Dr. Jose J Francis is a patient recently in SNF after hospitalization for respiratory failure. I am trying to resestablish some of her outpatient follow ups and appointments. Would like to establish with each of you. Thank you. Channing Lee

## 2018-07-11 ENCOUNTER — OFFICE VISIT (OUTPATIENT)
Dept: INTERNAL MEDICINE | Facility: CLINIC | Age: 83
End: 2018-07-11
Attending: FAMILY MEDICINE
Payer: MEDICARE

## 2018-07-11 ENCOUNTER — TELEPHONE (OUTPATIENT)
Dept: INTERNAL MEDICINE | Facility: CLINIC | Age: 83
End: 2018-07-11

## 2018-07-11 VITALS
WEIGHT: 152.88 LBS | BODY MASS INDEX: 26.1 KG/M2 | HEART RATE: 95 BPM | DIASTOLIC BLOOD PRESSURE: 54 MMHG | HEIGHT: 64 IN | SYSTOLIC BLOOD PRESSURE: 104 MMHG

## 2018-07-11 DIAGNOSIS — R60.9 EDEMA, UNSPECIFIED TYPE: ICD-10-CM

## 2018-07-11 DIAGNOSIS — F01.50 VASCULAR DEMENTIA WITHOUT BEHAVIORAL DISTURBANCE: ICD-10-CM

## 2018-07-11 DIAGNOSIS — Z79.52 LONG TERM (CURRENT) USE OF SYSTEMIC STEROIDS: ICD-10-CM

## 2018-07-11 DIAGNOSIS — M05.79 SEROPOSITIVE RHEUMATOID ARTHRITIS OF MULTIPLE SITES: ICD-10-CM

## 2018-07-11 DIAGNOSIS — R53.81 PHYSICAL DECONDITIONING: ICD-10-CM

## 2018-07-11 DIAGNOSIS — J18.9 PNEUMONIA OF BOTH LUNGS DUE TO INFECTIOUS ORGANISM, UNSPECIFIED PART OF LUNG: ICD-10-CM

## 2018-07-11 DIAGNOSIS — Z86.73 HISTORY OF STROKE: ICD-10-CM

## 2018-07-11 DIAGNOSIS — J96.01 ACUTE HYPOXEMIC RESPIRATORY FAILURE: Primary | ICD-10-CM

## 2018-07-11 DIAGNOSIS — I10 HYPERTENSION, ESSENTIAL: ICD-10-CM

## 2018-07-11 PROCEDURE — 99214 OFFICE O/P EST MOD 30 MIN: CPT | Mod: S$PBB,,, | Performed by: FAMILY MEDICINE

## 2018-07-11 PROCEDURE — 99213 OFFICE O/P EST LOW 20 MIN: CPT | Mod: PBBFAC | Performed by: FAMILY MEDICINE

## 2018-07-11 PROCEDURE — 99999 PR PBB SHADOW E&M-EST. PATIENT-LVL III: CPT | Mod: PBBFAC,,, | Performed by: FAMILY MEDICINE

## 2018-07-11 RX ORDER — PREDNISONE 20 MG/1
40 TABLET ORAL DAILY
COMMUNITY
End: 2018-07-11

## 2018-07-11 RX ORDER — FUROSEMIDE 20 MG/1
20 TABLET ORAL 2 TIMES DAILY
Qty: 30 TABLET | Refills: 1 | Status: SHIPPED | OUTPATIENT
Start: 2018-07-11 | End: 2018-08-06 | Stop reason: SDUPTHER

## 2018-07-11 RX ORDER — METOPROLOL TARTRATE 25 MG/1
12.5 TABLET, FILM COATED ORAL 2 TIMES DAILY
Qty: 30 TABLET | Refills: 1
Start: 2018-07-11 | End: 2018-07-17 | Stop reason: SDUPTHER

## 2018-07-11 RX ORDER — LINACLOTIDE 145 UG/1
CAPSULE, GELATIN COATED ORAL
COMMUNITY
Start: 2018-06-19 | End: 2018-08-06

## 2018-07-11 RX ORDER — DOXEPIN HYDROCHLORIDE 50 MG/G
CREAM TOPICAL
COMMUNITY
Start: 2018-04-18 | End: 2018-09-04

## 2018-07-11 RX ORDER — PREDNISONE 5 MG/1
5 TABLET ORAL
Qty: 210 TABLET | Refills: 0
Start: 2018-07-11 | End: 2018-09-28

## 2018-07-11 RX ORDER — AMLODIPINE BESYLATE 5 MG/1
5 TABLET ORAL DAILY
Qty: 90 TABLET | Refills: 1 | Status: SHIPPED | OUTPATIENT
Start: 2018-07-11 | End: 2018-07-27

## 2018-07-11 NOTE — PROGRESS NOTES
Subjective:       Patient ID: Selma Rosado MD Jose J is a 82 y.o. female.    Chief Complaint: Hospital Follow Up    HPI  Review of Systems   Constitutional: Positive for fatigue. Negative for chills, fever and unexpected weight change.   HENT: Negative for congestion and trouble swallowing.    Eyes: Negative for redness and visual disturbance.   Respiratory: Positive for shortness of breath. Negative for cough and chest tightness.    Cardiovascular: Positive for leg swelling. Negative for chest pain and palpitations.   Gastrointestinal: Negative for abdominal pain and blood in stool.   Genitourinary: Negative for difficulty urinating, hematuria and menstrual problem.   Musculoskeletal: Positive for gait problem. Negative for arthralgias, back pain, joint swelling, myalgias and neck pain.   Skin: Negative for color change and rash.   Neurological: Positive for speech difficulty and weakness. Negative for tremors, numbness and headaches.   Hematological: Negative for adenopathy. Does not bruise/bleed easily.   Psychiatric/Behavioral: Negative for behavioral problems, confusion and sleep disturbance. The patient is not nervous/anxious.        Objective:      Physical Exam   Constitutional: She is oriented to person, place, and time. She appears well-developed and well-nourished.   HENT:   Head: Normocephalic and atraumatic.   Eyes: Conjunctivae are normal. No scleral icterus.   Neck: Normal range of motion. Neck supple.   Cardiovascular: Normal rate, normal heart sounds and intact distal pulses.  Exam reveals no gallop and no friction rub.    No murmur heard.  Pulmonary/Chest: Effort normal and breath sounds normal. No respiratory distress. She has no wheezes. She has no rales.   Abdominal: She exhibits no distension and no abdominal bruit. There is no tenderness.   Musculoskeletal: She exhibits no edema or deformity.   Neurological: She is alert and oriented to person, place, and time. She displays no tremor. No  cranial nerve deficit. Coordination and gait normal.   Skin: Skin is warm and dry. No rash noted. She is not diaphoretic. No erythema.   Psychiatric: She has a normal mood and affect. Her behavior is normal. Judgment and thought content normal.   Nursing note and vitals reviewed.      Assessment:       1. Acute hypoxemic respiratory failure    2. History of stroke    3. Long term (current) use of systemic steroids    4. Physical deconditioning    5. Pneumonia of both lungs due to infectious organism, unspecified part of lung    6. Seropositive rheumatoid arthritis of multiple sites    7. Vascular dementia without behavioral disturbance    8. Hypertension, essential    9. Edema, unspecified type        Plan:   Selma was seen today for hospital follow up.    Diagnoses and all orders for this visit:    Acute hypoxemic respiratory failure  Comments:  hospitalization diagnosis - improved    History of stroke  -     Ambulatory Consult to Home Health    Long term (current) use of systemic steroids    Physical deconditioning    Pneumonia of both lungs due to infectious organism, unspecified part of lung    Seropositive rheumatoid arthritis of multiple sites    Vascular dementia without behavioral disturbance    Hypertension, essential    Edema, unspecified type  -     COMPRESSION STOCKINGS    Other orders  -     metoprolol tartrate (LOPRESSOR) 25 MG tablet; Take 0.5 tablets (12.5 mg total) by mouth 2 (two) times daily.  -     amLODIPine (NORVASC) 5 MG tablet; Take 1 tablet (5 mg total) by mouth once daily.  -     predniSONE (DELTASONE) 5 MG tablet; Take 1 tablet (5 mg total) by mouth HT ICU Setup as needed. Take 4 in the am and 3 in the evening for 2 weeks - then  Take 3 in the am and 3 in the evening for 2 weeks - then  Take 3 in the am and 2 in the evening for 2 weeks - consult with rheumatology for further advice  -     furosemide (LASIX) 20 MG tablet; Take 1 tablet (20 mg total) by mouth 2 (two) times daily.      See  meds, orders, follow up, routing and instructions sections of encounter.  This is an 82-year-old patient following up.  She has no acute complaints at   this time.  We are still trying to sort out her post-hospitalization medical   issues.  She is currently taking 40 mg of daily prednisone.  The chart says 20   mg two tablets; however, she informs me she is taking 5 mg eight tablets and   they have been having some mix ups at home with this.  This was initially for   rheumatoid arthritis and increased to 40 mg upon her discharge, which was   continued by default through her skilled nursing stay.  It is probably prudent   to wean this at this time and I did provide some weaning instructions that are   documented on her after visit summary.  She will be seeing Rheumatology for a   consult in approximately a week or two.  I would ask them to provide input as to   the ultimate floor.  However, at this time, we will drop by 5 mg approximately   every other week until we got down in the 5-10 mg daily.  She did not get her   chest x-ray last week and I asked that we follow up on that this week.  She was   not willing to do that today secondary to time commitments.  She requests speech   therapy for home health and we can accommodate that.  I explained we do not   want to do prolonged home health, probably another month and then transition her   to an outpatient setting.    She has no other acute complaints today.  She seems to be doing better.  She is   walking with a walker and was able to navigate the building without significant   difficulty today.    She did present with her daughter.  We went over her medication lists.  Given   her pressure is within control, we will drop her amlodipine to 5 mg and she has   a Cardiology appointment at the end of the week.    PLAN:  Follow up in six weeks.  Keep consultant appointments.    Today's appointment was approximately 30 minutes including greater than 50% of   time spent in  counseling, record review and medication reconciliation.      SELENA/HN  dd: 07/11/2018 11:54:18 (CDT)  td: 07/12/2018 07:58:20 (CDT)  Doc ID   #[id number]  Job ID #[id number]    CC:

## 2018-07-11 NOTE — TELEPHONE ENCOUNTER
----- Message from Bhargav Lee MD sent at 7/11/2018  5:13 PM CDT -----  Please schedule patient's chest xray this Friday around her cardiology appointment - thank you

## 2018-07-11 NOTE — PATIENT INSTRUCTIONS
Prednisone taper - 5 mg prednisone tablets    Take 4 in the am and 3 in the evening for 2 weeks - then  Take 3 in the am and 3 in the evening for 2 weeks - then  Take 3 in the am and 2 in the evening for 2 weeks - consult with rheumatology for further advice

## 2018-07-11 NOTE — PROGRESS NOTES
Subjective:       Patient ID: Selma Rosado MD Jose J is a 82 y.o. female.    Chief Complaint: Hospital Follow Up    HPI  Review of Systems    Objective:      Physical Exam    Assessment:       1. Acute hypoxemic respiratory failure    2. History of stroke    3. Long term (current) use of systemic steroids    4. Physical deconditioning    5. Pneumonia of both lungs due to infectious organism, unspecified part of lung    6. Seropositive rheumatoid arthritis of multiple sites    7. Vascular dementia without behavioral disturbance    8. Hypertension, essential    9. Edema, unspecified type        Plan:   Selma was seen today for hospital follow up.    Diagnoses and all orders for this visit:    Acute hypoxemic respiratory failure  Comments:  hospitalization diagnosis - improved    History of stroke    Long term (current) use of systemic steroids    Physical deconditioning    Pneumonia of both lungs due to infectious organism, unspecified part of lung    Seropositive rheumatoid arthritis of multiple sites    Vascular dementia without behavioral disturbance    Hypertension, essential    Edema, unspecified type  -     COMPRESSION STOCKINGS    Other orders  -     metoprolol tartrate (LOPRESSOR) 25 MG tablet; Take 0.5 tablets (12.5 mg total) by mouth 2 (two) times daily.  -     amLODIPine (NORVASC) 5 MG tablet; Take 1 tablet (5 mg total) by mouth once daily.  -     predniSONE (DELTASONE) 5 MG tablet; Take 1 tablet (5 mg total) by mouth HT ICU Setup as needed. Take 4 in the am and 3 in the evening for 2 weeks - then  Take 3 in the am and 3 in the evening for 2 weeks - then  Take 3 in the am and 2 in the evening for 2 weeks - consult with rheumatology for further advice  -     furosemide (LASIX) 20 MG tablet; Take 1 tablet (20 mg total) by mouth 2 (two) times daily.      See meds, orders, follow up, routing and instructions sections of encounter.

## 2018-07-13 ENCOUNTER — DOCUMENTATION ONLY (OUTPATIENT)
Dept: INTERNAL MEDICINE | Facility: CLINIC | Age: 83
End: 2018-07-13

## 2018-07-13 ENCOUNTER — OFFICE VISIT (OUTPATIENT)
Dept: CARDIOLOGY | Facility: CLINIC | Age: 83
End: 2018-07-13
Payer: MEDICARE

## 2018-07-13 ENCOUNTER — HOSPITAL ENCOUNTER (OUTPATIENT)
Dept: RADIOLOGY | Facility: HOSPITAL | Age: 83
Discharge: HOME | End: 2018-07-13
Attending: FAMILY MEDICINE
Payer: MEDICARE

## 2018-07-13 VITALS
HEIGHT: 64 IN | BODY MASS INDEX: 26.61 KG/M2 | OXYGEN SATURATION: 99 % | SYSTOLIC BLOOD PRESSURE: 135 MMHG | HEART RATE: 82 BPM | WEIGHT: 155.88 LBS | DIASTOLIC BLOOD PRESSURE: 65 MMHG

## 2018-07-13 DIAGNOSIS — I10 HYPERTENSION, ESSENTIAL: ICD-10-CM

## 2018-07-13 DIAGNOSIS — I10 HYPERTENSION, ESSENTIAL: Primary | ICD-10-CM

## 2018-07-13 DIAGNOSIS — I48.0 AF (PAROXYSMAL ATRIAL FIBRILLATION): ICD-10-CM

## 2018-07-13 DIAGNOSIS — R60.9 EDEMA, UNSPECIFIED TYPE: ICD-10-CM

## 2018-07-13 DIAGNOSIS — I51.89 DIASTOLIC DYSFUNCTION WITHOUT HEART FAILURE: ICD-10-CM

## 2018-07-13 DIAGNOSIS — I67.9 SMALL VESSEL DISEASE, CEREBROVASCULAR: ICD-10-CM

## 2018-07-13 PROCEDURE — 71046 X-RAY EXAM CHEST 2 VIEWS: CPT | Mod: 26,,, | Performed by: RADIOLOGY

## 2018-07-13 PROCEDURE — 99213 OFFICE O/P EST LOW 20 MIN: CPT | Mod: S$PBB,GC,, | Performed by: STUDENT IN AN ORGANIZED HEALTH CARE EDUCATION/TRAINING PROGRAM

## 2018-07-13 PROCEDURE — 71046 X-RAY EXAM CHEST 2 VIEWS: CPT | Mod: TC,FY

## 2018-07-13 PROCEDURE — 99999 PR PBB SHADOW E&M-EST. PATIENT-LVL V: CPT | Mod: PBBFAC,GC,, | Performed by: STUDENT IN AN ORGANIZED HEALTH CARE EDUCATION/TRAINING PROGRAM

## 2018-07-13 PROCEDURE — 99215 OFFICE O/P EST HI 40 MIN: CPT | Mod: PBBFAC,25 | Performed by: STUDENT IN AN ORGANIZED HEALTH CARE EDUCATION/TRAINING PROGRAM

## 2018-07-13 RX ORDER — DIAZEPAM 5 MG/1
5 TABLET ORAL EVERY 6 HOURS PRN
COMMUNITY
End: 2018-08-20

## 2018-07-13 RX ORDER — ASPIRIN 81 MG/1
81 TABLET ORAL DAILY
COMMUNITY
End: 2021-06-09

## 2018-07-13 NOTE — PATIENT INSTRUCTIONS
-Have blood drawn today (BNP)  -Take Lasix 40 mg (2 x 20 mg tabs) twice daily for 3-4 days and see how swelling does  -Continue compression stockings  -Low salt diet  -Keep daily weights  -We will contact you to schedule your 30 day event monitor to evaluate for atrial fibrillation

## 2018-07-16 ENCOUNTER — TELEPHONE (OUTPATIENT)
Dept: ADMINISTRATIVE | Facility: CLINIC | Age: 83
End: 2018-07-16

## 2018-07-16 NOTE — PROGRESS NOTES
I have personally interviewed and examined the patient. I have reviewed the notes, assessments and plans of Dr Wolf and I CONCUR with his documentation of Selma Lux MD.

## 2018-07-16 NOTE — PROGRESS NOTES
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 She was last seen in late February for Cardiology evaluation for echo with diastolic dysfunction, biatrial enlargement as well as an MRI demonstrating remote punctate infarct and likely age advanced chronic microvascular ischemic changes. She had no known hx of AF at that time and no clinical signs/sx of CHF at that time.    She subsequently had a long hospitalization in April for acute hypoxemic respiratory failure with bilateral lung infiltrates due to bilateral PNA vs  vs Humira reaction; per notes, now suspected more likely noninfectious. Cultures, including from bronchoscopy, were negative. During her hospitalization, she developed AF/RVR while on ventilator, which converted on amiodarone. She has had no subsequent known AF since that time. She was discharged to rehab then SNF and recently returned home several weeks ago. She has been on ASA and plavix for her cerebrovascular disease hx but not on anticoagulation.    She presents today for evaluation noting bilateral SANAZ, L>R since her discharge. Improves slightly with wearing compression stockings which she has intermittently done. Denies any respiratory symptoms, no SOB/BEJARANO, PND, orthopnea. She has been on lasix 20 mg bid with some slight initial improvement but persistent pitting edema of her LE. Her norvasc, which she had been on for yrs prior to her hospitalization without any issues with edema, was recently decreased to 5 mg for low Bps. She denies any issues with dizziness, lightheadedness, presyncope/syncope. She is also now on metoprolol 25 bid since discharge, likely given the AF. Her echo during hospitalization as below, notable for intermediate estimated RAP by IVC. BNPs today and before/during hospitalization in the 100s.     TTE 4/2018:    1 - Severe  left atrial enlargement.     2 - Eccentric hypertrophy.     3 - Normal left ventricular systolic function (EF 60-65%).     4 - No wall motion abnormalities.     5 - Impaired LV relaxation, elevated LAP (grade 2 diastolic dysfunction).     6 - Normal right ventricular systolic function .     7 - Trivial mitral regurgitation.     8 - Mild to moderate tricuspid regurgitation.     9 - Normal right ventricular systolic function .     10 - Intermediate central venous pressure.     11 - Trivial pericardial effusion.    ROS:    Constitution: Negative for fever or chills. Negative for weight loss or gain.   HENT: Negative for sore throat or headaches. Negative for rhinorrhea.  Eyes: Negative for blurred or double vision.   Cardiovascular: See above  Pulmonary: Negative for SOB. Negative for cough.   Gastrointestinal: Negative for abdominal pain. Negative for nausea/ vomiting. Negative for diarrhea.   : Negative for dysuria.   Neurological: Negative for focal weakness or sensory changes.  PMH:     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    Hypertension     IBS (irritable bowel syndrome) 6/21/2017    IGT (impaired glucose tolerance) 1/12/2016    Iron deficiency anemia secondary to inadequate dietary iron intake 6/24/2013    Joint pain     Keratoconjunctivitis sicca of both eyes not specified as Sjogren's 7/29/2016    Leiomyoma of uterus, unspecified 9/16/2014    Long QT interval 6/29/2016    Due to medication (plaquenil)     Macular edema 1/12/2015     Multinodular goiter 1/12/2016    Neuropathy 8/23/2017    Plaquenil causing adverse effect in therapeutic use 10/7/2016    Pneumococcal vaccine refused 8/17/2016    Pneumonia due to Streptococcus pneumoniae 4/5/2018    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/2015    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    s/p Right Total knee replacement 5/15/2013    Scoliosis of thoracic spine 5/15/2013    Small vessel disease, cerebrovascular 12/28/2017    Stroke     Vascular dementia 12/6/2017     Past Surgical History:   Procedure Laterality Date    CATARACT EXTRACTION      COLONOSCOPY N/A 9/29/2015    Procedure: COLONOSCOPY;  Surgeon: FIDELINA Sanchez MD;  Location: [facility] ([address] [place] [address]);  Service: Endoscopy;  Laterality: N/A;    EYE SURGERY      JOINT REPLACEMENT      right knee    KNEE SURGERY Left 12/31/2013    TKR    left parotidectomy      mixed tumor    SALIVARY GLAND SURGERY      TONSILLECTOMY       Allergies:   Review of patient's allergies indicates:  No Known Allergies  Medications:     Current Outpatient Prescriptions on File Prior to Visit   Medication Sig Dispense Refill    amLODIPine (NORVASC) 5 MG tablet Take 1 tablet (5 mg total) by mouth once daily. 90 tablet 1    clopidogrel (PLAVIX) 75 mg tablet 1 tablet (75 mg total) by Per G Tube route once daily. 90 tablet 1    doxepin (ZONALON) 5 % cream       furosemide (LASIX) 20 MG tablet Take 1 tablet (20 mg total) by mouth 2 (two) times daily. 30 tablet 1    gabapentin (NEURONTIN) 300 MG capsule Take 300 mg by mouth 3 (three) times daily.      hydrocodone-acetaminophen 5-325mg (NORCO) 5-325 mg per tablet 1 tablet by Per G Tube route every 8 (eight) hours as needed for Pain (severe pain). 30 tablet 0    hydroxychloroquine (PLAQUENIL) 200 mg tablet 2 tablets (400 mg total) by Per G Tube route once daily. 180 tablet 1    magnesium oxide (MAG-OX) 400 mg tablet Take 400 mg by mouth once daily.      metoprolol tartrate  "(LOPRESSOR) 25 MG tablet Take 0.5 tablets (12.5 mg total) by mouth 2 (two) times daily. 30 tablet 1    predniSONE (DELTASONE) 5 MG tablet Take 1 tablet (5 mg total) by mouth HT ICU Setup as needed. Take 4 in the am and 3 in the evening for 2 weeks - then  Take 3 in the am and 3 in the evening for 2 weeks - then  Take 3 in the am and 2 in the evening for 2 weeks - consult with rheumatology for further advice 210 tablet 0    thiamine 100 MG tablet Take 100 mg by mouth once daily.      LINZESS 145 mcg Cap capsule       pantoprazole (PROTONIX) 40 mg GrPS 1 packet (40 mg total) by Per G Tube route once daily. 30 packet 11     No current facility-administered medications on file prior to visit.      Social History:     Social History   Substance Use Topics    Smoking status: Never Smoker    Smokeless tobacco: Never Used    Alcohol use No      Comment: very seldom      Family History:     Family History   Problem Relation Age of Onset    Hypertension Mother     Heart disease Mother     Prostate cancer Father         prostate cancer    Cancer Father     Breast cancer Maternal Grandmother     Lupus Neg Hx     Psoriasis Neg Hx     Melanoma Neg Hx     Colon cancer Neg Hx      Physical Exam:   /65 (BP Location: Left arm, Patient Position: Sitting, BP Method: Medium (Automatic))   Pulse 82   Ht 5' 4" (1.626 m)   Wt 70.7 kg (155 lb 13.8 oz)   LMP  (LMP Unknown)   SpO2 99%   BMI 26.75 kg/m²      Constitutional: No acute distress, conversant  HEENT: Sclera anicteric, extraocular motions intact  Neck: Mildly elevated JVP ~12 cm, no carotid bruits  Cardiovascular: regular rate and rhythm, no murmur, rubs or gallops, normal S1/S2  Pulmonary: Clear to auscultation bilaterally  Abdominal: Abdomen soft, nontender, nondistended  Extremities: 2+ lower extremity edema bilaterally to below knee, L slightly greater than R. No increased warmth or erythema, nontender.  Pulses: 2+ BL Radial, 2+ BL carotid, 2+ BL " PT  Skin: No rash  Psych: Alert and oriented x 3, appropriate affect  Neuro: CNII-XII intact, no focal deficits    Labs:     Lab Results   Component Value Date     (L) 05/02/2018    K 4.0 05/02/2018     05/02/2018    CO2 22 (L) 05/02/2018    BUN 14 05/02/2018    CREATININE 0.6 05/02/2018    ANIONGAP 6 (L) 05/02/2018     Lab Results   Component Value Date    AST 17 04/25/2018    ALT 20 04/25/2018    ALKPHOS 58 04/25/2018    BILITOT 0.6 04/25/2018    BILIDIR 0.4 (H) 04/25/2018    ALBUMIN 1.9 (L) 04/25/2018     Lab Results   Component Value Date    CALCIUM 7.8 (L) 05/02/2018    MG 2.0 04/25/2018    PHOS 2.8 04/25/2018     Lab Results   Component Value Date     (H) 07/13/2018     (H) 04/15/2018    BNP 84 04/13/2018    Lab Results   Component Value Date    WBC 10.47 05/02/2018    HGB 8.3 (L) 05/02/2018    HCT 28.6 (L) 05/02/2018     (L) 05/02/2018    GRAN 7.9 (H) 05/02/2018    GRAN 75.3 (H) 05/02/2018     Lab Results   Component Value Date    INR 1.2 04/25/2018     Lab Results   Component Value Date    CHOL 166 05/10/2017    HDL 76 (H) 05/10/2017    LDLCALC 77.8 05/10/2017    TRIG 52 04/16/2018     Lab Results   Component Value Date    HGBA1C 5.5 08/24/2017     Lab Results   Component Value Date    TSH 1.091 04/11/2018    Y7GEFHP 5.8 07/07/2014          Imaging:   MRI Brain 8/29/2017  No acute infarction.  Small punctate remote infarcts noted within the right centrum semiovale, left anterior thalamus, right sultana-tatyana and bilateral cerebellar hemispheres.  Prominent patchy T2/FLAIR signal hyperintensity noted throughout the supratentorial periventricular white matter and tatyana, nonspecific but most suggestive of age advanced chronic microvascular ischemic changes.  There is mild generalized cerebral volume loss.  No midline shift or mass effect.  No hydrocephalus.  No enhancing mass lesions.  No midline shift or mass effect.  Intracranial T2 flow voids are present. Sellar regions is  unremarkable. Cerebellar tonsils are in their expected location. Cervicomedullary junctions is normal.     Paranasal sinuses and mastoids are clear.  No marrow replacement process.  Orbits are within normal limits.   Impression         No acute intracranial abnormalities.  Specifically, no acute infarction or enhancing mass lesions.    Punctate remote infarct within the right centrum semiovale, left anterior thalamus and right sultana-tatyana.    Age advanced chronic microvascular ischemic changes.         EF   Date Value Ref Range Status   04/07/2018 65 55 - 65    01/03/2018 55 55 - 65      1 - Normal left ventricular systolic function (EF 55-60%).     2 - No wall motion abnormalities.     3 - Biatrial enlargement.     4 - Impaired LV relaxation, elevated LAP (grade 2 diastolic dysfunction).     5 - Normal right ventricular systolic function .     6 - The estimated PA systolic pressure is 37 mmHg.     7 - Mild mitral regurgitation.     8 - Mild tricuspid regurgitation.     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 Previously seen with Echo showing G2 diastolic dysfunciton, intact EF, no clinical evidence of CHF in February.   She returns today with new edema in bilateral LE. No other symptoms, but JVP mildly elevated. On lasix 20 bid. Persistent since her hospitalization, Cr normal. BNP remains in 100s. Will trial doubling dose for 3-4 days and see how she does at home. BP well controlled, can consider discontinuation of amlodipine as well but has prev tolerated well without issues with edema.    She also presents for evaluation of AF/RVR which occurred in context of acute hypoxemic respiratory failure while on mechanical ventilation. One time event per review of  notes; no hx of clinical AF previously, though had echo with DD and biatrial enlargement, which could represent increased risk for AF or past subclinical AF. Given context of acute event and her increased bleeding risk, recommend 30d event monitor to assess for AF at home. If significant AF at home, then will discuss anticoagulation. [id number] at least 4 (age, female, HTN), 6 if including past remote hx of punctate infarct. HASBLED 3. If starting anticoagulation, either ASA or plavix would need to be discontinued, could further discuss with Neuro.    1. Hypertension, essential     2. Diastolic dysfunction without heart failure  B-TYPE NATRIURETIC PEPTIDE   3. Small vessel disease, cerebrovascular     4. AF (paroxysmal atrial fibrillation)  Cardiac event monitor (30 day)   5. Edema, unspecified type  B-TYPE NATRIURETIC PEPTIDE     Plan:   -Take Lasix 40 mg (2 x 20 mg tabs) twice daily for 3-4 days and see how swelling does, then back to prior 20 mg bid  -Continue compression stockings  -Low salt diet  -Keep daily weights to monitor fluid status  -30D event monitor  -Continue metoprolol, amlodipine 5. Can consider scaling back anti-htn as well controlled.    Seen and discussed with Dr. Fonseca, return in 2-3 wks to reevaluate edema.    Signed:    Demond Wolf MD  Cardiology Fellow PGY4  Beeper:  [phone]  7/15/2018 11:03 PM    Follow-up:     Future Appointments  Date Time Provider Department Center   7/17/2018 11:00 AM MONITOR, ARRHYTHMIA EVENT [facility] ARRHYTH [facility]   7/19/2018 1:00 PM Faina Flanagan MD [facility] RHEUM [facility]   7/27/2018 10:40 AM Demond Wolf MD [facility] CARDIO [facility]   9/4/2018 10:50 AM MASSIEL Saeed MD [facility] OPHTHAL [facility]   9/10/2018 2:00 PM LAB, HEMONC SAME DAY [facility] LAB [facility] [place]   9/10/2018 3:00 PM Sadia Joe MD [facility] TERRY [place]

## 2018-07-16 NOTE — TELEPHONE ENCOUNTER
Home Health SOC 07/02/2018 to 08/30/2018  With [facility], Dr Bhargav Lee.  services.

## 2018-07-17 ENCOUNTER — CLINICAL SUPPORT (OUTPATIENT)
Dept: ELECTROPHYSIOLOGY | Facility: CLINIC | Age: 83
End: 2018-07-17
Attending: STUDENT IN AN ORGANIZED HEALTH CARE EDUCATION/TRAINING PROGRAM
Payer: MEDICARE

## 2018-07-17 DIAGNOSIS — I48.0 AF (PAROXYSMAL ATRIAL FIBRILLATION): ICD-10-CM

## 2018-07-17 PROCEDURE — 93270 REMOTE 30 DAY ECG REV/REPORT: CPT | Mod: PBBFAC | Performed by: INTERNAL MEDICINE

## 2018-07-17 RX ORDER — METOPROLOL TARTRATE 25 MG/1
12.5 TABLET, FILM COATED ORAL 2 TIMES DAILY
Qty: 30 TABLET | Refills: 0 | Status: SHIPPED | OUTPATIENT
Start: 2018-07-17 | End: 2018-07-27

## 2018-07-17 RX ORDER — METOPROLOL TARTRATE 25 MG/1
12.5 TABLET, FILM COATED ORAL 2 TIMES DAILY
Qty: 30 TABLET | Refills: 0 | OUTPATIENT
Start: 2018-07-17

## 2018-07-17 NOTE — TELEPHONE ENCOUNTER
----- Message from Xuan Montalvo sent at 7/16/2018  4:51 PM CDT -----  Contact: Self [phone]  Patient would like a call back regarding the status of a task Dr. Lee was taking care of for her. Patient did not wish to give any details with the phone staff.

## 2018-07-25 ENCOUNTER — TELEPHONE (OUTPATIENT)
Dept: INTERNAL MEDICINE | Facility: CLINIC | Age: 83
End: 2018-07-25

## 2018-07-25 DIAGNOSIS — Z86.73 HISTORY OF STROKE: Primary | ICD-10-CM

## 2018-07-25 DIAGNOSIS — R53.81 PHYSICAL DECONDITIONING: ICD-10-CM

## 2018-07-25 NOTE — TELEPHONE ENCOUNTER
Please see below and advise. Thank you NL    ----- Message from Amador Macdonald sent at 7/25/2018  3:07 PM CDT -----  Contact: [facility]/[place] [phone]  The patient will be finished with Home Health Physical Therapy on 8/3/18 and wants to be transitioned to outpatient Physical Therapy at [place].    Thank you

## 2018-07-27 ENCOUNTER — LAB VISIT (OUTPATIENT)
Dept: LAB | Facility: HOSPITAL | Age: 83
End: 2018-07-27
Payer: MEDICARE

## 2018-07-27 ENCOUNTER — OFFICE VISIT (OUTPATIENT)
Dept: CARDIOLOGY | Facility: CLINIC | Age: 83
End: 2018-07-27
Payer: MEDICARE

## 2018-07-27 VITALS
DIASTOLIC BLOOD PRESSURE: 77 MMHG | WEIGHT: 156.75 LBS | HEIGHT: 69 IN | SYSTOLIC BLOOD PRESSURE: 170 MMHG | BODY MASS INDEX: 23.22 KG/M2 | HEART RATE: 71 BPM

## 2018-07-27 DIAGNOSIS — D64.9 ANEMIA, UNSPECIFIED TYPE: Primary | ICD-10-CM

## 2018-07-27 DIAGNOSIS — D64.9 ANEMIA, UNSPECIFIED TYPE: ICD-10-CM

## 2018-07-27 DIAGNOSIS — I10 HYPERTENSION, UNSPECIFIED TYPE: ICD-10-CM

## 2018-07-27 LAB
ALBUMIN SERPL BCP-MCNC: 3.5 G/DL
ANISOCYTOSIS BLD QL SMEAR: SLIGHT
BASOPHILS # BLD AUTO: 0.03 K/UL
BASOPHILS NFR BLD: 0.2 %
DIFFERENTIAL METHOD: ABNORMAL
EOSINOPHIL # BLD AUTO: 0 K/UL
EOSINOPHIL NFR BLD: 0.2 %
ERYTHROCYTE [DISTWIDTH] IN BLOOD BY AUTOMATED COUNT: 14.5 %
HCT VFR BLD AUTO: 44.4 %
HGB BLD-MCNC: 13.5 G/DL
IMM GRANULOCYTES # BLD AUTO: 0.34 K/UL
IMM GRANULOCYTES NFR BLD AUTO: 2 %
LYMPHOCYTES # BLD AUTO: 1.7 K/UL
LYMPHOCYTES NFR BLD: 10.2 %
MAGNESIUM SERPL-MCNC: 2.4 MG/DL
MCH RBC QN AUTO: 27.4 PG
MCHC RBC AUTO-ENTMCNC: 30.4 G/DL
MCV RBC AUTO: 90 FL
MONOCYTES # BLD AUTO: 2.7 K/UL
MONOCYTES NFR BLD: 15.9 %
NEUTROPHILS # BLD AUTO: 12.1 K/UL
NEUTROPHILS NFR BLD: 71.5 %
NRBC BLD-RTO: 0 /100 WBC
PLATELET # BLD AUTO: 181 K/UL
PLATELET BLD QL SMEAR: ABNORMAL
PMV BLD AUTO: 10.2 FL
RBC # BLD AUTO: 4.92 M/UL
WBC # BLD AUTO: 16.91 K/UL

## 2018-07-27 PROCEDURE — 99999 PR PBB SHADOW E&M-EST. PATIENT-LVL IV: CPT | Mod: PBBFAC,GC,, | Performed by: STUDENT IN AN ORGANIZED HEALTH CARE EDUCATION/TRAINING PROGRAM

## 2018-07-27 PROCEDURE — 85025 COMPLETE CBC W/AUTO DIFF WBC: CPT

## 2018-07-27 PROCEDURE — 83735 ASSAY OF MAGNESIUM: CPT

## 2018-07-27 PROCEDURE — 99213 OFFICE O/P EST LOW 20 MIN: CPT | Mod: S$PBB,GC,, | Performed by: STUDENT IN AN ORGANIZED HEALTH CARE EDUCATION/TRAINING PROGRAM

## 2018-07-27 PROCEDURE — 82040 ASSAY OF SERUM ALBUMIN: CPT

## 2018-07-27 PROCEDURE — 36415 COLL VENOUS BLD VENIPUNCTURE: CPT

## 2018-07-27 PROCEDURE — 99214 OFFICE O/P EST MOD 30 MIN: CPT | Mod: PBBFAC | Performed by: STUDENT IN AN ORGANIZED HEALTH CARE EDUCATION/TRAINING PROGRAM

## 2018-07-27 RX ORDER — CARVEDILOL 6.25 MG/1
6.25 TABLET ORAL 2 TIMES DAILY WITH MEALS
Qty: 60 TABLET | Refills: 11 | Status: SHIPPED | OUTPATIENT
Start: 2018-07-27 | End: 2018-09-28 | Stop reason: SDUPTHER

## 2018-07-27 NOTE — PATIENT INSTRUCTIONS
Medications changes:  -Stop Metoprolol  -Start Carvedilol 6.25 mg twice daily  -Continue Lasix 20 mg twice daily  -Start an iron supplement once daily    Labs:  -Complete blood count    Other Instructions:  -Increase exercise as tolerated  -Continue compression stockings  -Elevate legs when resting

## 2018-07-27 NOTE — PROGRESS NOTES
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    She was initially seen in late February for Cardiology evaluation for echo with diastolic dysfunction, biatrial enlargement as well as an MRI demonstrating remote punctate infarct and likely age advanced chronic microvascular ischemic changes. She had no known hx of AF at that time and no clinical signs/sx of CHF at that time.    She subsequently had a long hospitalization in April for acute hypoxemic respiratory failure with bilateral lung infiltrates due to bilateral PNA vs  vs Humira reaction; per notes, now suspected more likely noninfectious. Cultures, including from bronchoscopy, were negative. During her hospitalization, she developed AF/RVR while on ventilator, which converted on amiodarone. She has had no subsequent known AF since that time. She was discharged to rehab then SNF and recently returned home several weeks ago. She has been on ASA and plavix for her cerebrovascular disease hx but not on anticoagulation.    She presents today for follow up evaluation of bilateral SANAZ to midshin since discharge. Wearing OTC compression stockings intermittently; still waiting for custom stockings. Has never had any respiratory symptoms associated with edema. Denies SOB/BEJARANO, PND, orthopnea. Still working on increasing mobility, thinks she is more limited by strength than any breathing issues. Last visit, discussed possibly discontinuing norvasc which she stopped and considering trial of increased lasix. She has stayed taking 20 mg twice daily; has not trialled increased dose. Edema continues very slow incremental improvement but remains with significant pedal and pretibial edema to just below midshin. Also of note, had significant hypoalbuminemia and anemia during her critical illness  (alb to <2, Hgb to <8).    Wearing 30d event monitor today to assess for further AF.    TTE 4/2018:    1 - Severe left atrial enlargement.     2 - Eccentric hypertrophy.     3 - Normal left ventricular systolic function (EF 60-65%).     4 - No wall motion abnormalities.     5 - Impaired LV relaxation, elevated LAP (grade 2 diastolic dysfunction).     6 - Normal right ventricular systolic function .     7 - Trivial mitral regurgitation.     8 - Mild to moderate tricuspid regurgitation.     9 - Normal right ventricular systolic function .     10 - Intermediate central venous pressure.     11 - Trivial pericardial effusion.    ROS:    Constitution: Negative for fever or chills. Negative for weight loss or gain.   HENT: Negative for sore throat or headaches. Negative for rhinorrhea.  Eyes: Negative for blurred or double vision.   Cardiovascular: See above  Pulmonary: Negative for SOB. Negative for cough.   Gastrointestinal: Negative for abdominal pain. Negative for nausea/ vomiting. Negative for diarrhea.   : Negative for dysuria.   Neurological: Negative for focal weakness or sensory changes.  PMH:     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    Hypertension     IBS (irritable bowel syndrome) 6/21/2017    IGT (impaired glucose tolerance) 1/12/2016    Iron deficiency anemia secondary to inadequate dietary iron intake 6/24/2013    Joint pain     Keratoconjunctivitis sicca of both eyes not specified as Sjogren's 7/29/2016    Leiomyoma of uterus,  unspecified 9/16/2014    Long QT interval 6/29/2016    Due to medication (plaquenil)     Macular edema 1/12/2015    Multinodular goiter 1/12/2016    Neuropathy 8/23/2017    Plaquenil causing adverse effect in therapeutic use 10/7/2016    Pneumococcal vaccine refused 8/17/2016    Pneumonia due to Streptococcus pneumoniae 4/5/2018    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/2015    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    s/p Right Total knee replacement 5/15/2013    Scoliosis of thoracic spine 5/15/2013    Small vessel disease, cerebrovascular 12/28/2017    Stroke     Vascular dementia 12/6/2017     Past Surgical History:   Procedure Laterality Date    CATARACT EXTRACTION      COLONOSCOPY N/A 9/29/2015    Procedure: COLONOSCOPY;  Surgeon: FIDELINA Sanchez MD;  Location: [facility] ([address] [place] [address]);  Service: Endoscopy;  Laterality: N/A;    EYE SURGERY      JOINT REPLACEMENT      right knee    KNEE SURGERY Left 12/31/2013    TKR    left parotidectomy      mixed tumor    SALIVARY GLAND SURGERY      TONSILLECTOMY       Allergies:   Review of patient's allergies indicates:  No Known Allergies  Medications:     Current Outpatient Prescriptions on File Prior to Visit   Medication Sig Dispense Refill    aspirin (ECOTRIN) 81 MG EC tablet Take 81 mg by mouth once daily.      clopidogrel (PLAVIX) 75 mg tablet 1 tablet (75 mg total) by Per G Tube route once daily. 90 tablet 1    diazePAM (VALIUM) 5 MG tablet Take 5 mg by mouth every 6 (six) hours as needed for Anxiety.      doxepin (ZONALON) 5 % cream       furosemide (LASIX) 20 MG tablet Take 1 tablet (20 mg total) by mouth 2 (two) times daily. 30 tablet 1    gabapentin (NEURONTIN) 300 MG capsule Take 300 mg by mouth 3 (three) times daily.      hydrocodone-acetaminophen 5-325mg (NORCO) 5-325 mg per tablet 1 tablet by Per G Tube route every 8 (eight) hours as needed for Pain (severe pain). 30 tablet 0    hydroxychloroquine (PLAQUENIL)  "200 mg tablet 2 tablets (400 mg total) by Per G Tube route once daily. 180 tablet 1    LINZESS 145 mcg Cap capsule       magnesium oxide (MAG-OX) 400 mg tablet Take 400 mg by mouth once daily.      metoprolol tartrate (LOPRESSOR) 25 MG tablet Take 0.5 tablets (12.5 mg total) by mouth 2 (two) times daily. 30 tablet 0    pantoprazole (PROTONIX) 40 mg GrPS 1 packet (40 mg total) by Per G Tube route once daily. 30 packet 11    predniSONE (DELTASONE) 5 MG tablet Take 1 tablet (5 mg total) by mouth HT ICU Setup as needed. Take 4 in the am and 3 in the evening for 2 weeks - then  Take 3 in the am and 3 in the evening for 2 weeks - then  Take 3 in the am and 2 in the evening for 2 weeks - consult with rheumatology for further advice 210 tablet 0    thiamine 100 MG tablet Take 100 mg by mouth once daily.      amLODIPine (NORVASC) 5 MG tablet Take 1 tablet (5 mg total) by mouth once daily. 90 tablet 1     No current facility-administered medications on file prior to visit.      Social History:     Social History   Substance Use Topics    Smoking status: Never Smoker    Smokeless tobacco: Never Used    Alcohol use No      Comment: very seldom      Family History:     Family History   Problem Relation Age of Onset    Hypertension Mother     Heart disease Mother     Prostate cancer Father         prostate cancer    Cancer Father     Breast cancer Maternal Grandmother     Lupus Neg Hx     Psoriasis Neg Hx     Melanoma Neg Hx     Colon cancer Neg Hx      Physical Exam:   BP (!) 170/77 (BP Location: Left arm, Patient Position: Sitting, BP Method: Medium (Automatic))   Pulse 71   Ht 5' 9" (1.753 m)   Wt 71.1 kg (156 lb 12 oz)   LMP  (LMP Unknown)   BMI 23.15 kg/m²      Constitutional: No acute distress, conversant  HEENT: Sclera anicteric, extraocular motions intact  Neck: No JVD, no carotid bruits  Cardiovascular: regular rate and rhythm, 2/6 ALBIN at LUSB, rubs or gallops, normal S1/S2  Pulmonary: Clear to " auscultation bilaterally  Abdominal: Abdomen soft, nontender, nondistended  Extremities: 2+ lower extremity edema bilaterally to midshin. No increased warmth or erythema, nontender.  Pulses: 2+ BL Radial, 2+ BL carotid, 2+ BL PT  Skin: No rash  Psych: Alert and oriented x 3, appropriate affect  Neuro: CNII-XII intact, no focal deficits    Labs:     Lab Results   Component Value Date     (L) 05/02/2018    K 4.0 05/02/2018     05/02/2018    CO2 22 (L) 05/02/2018    BUN 14 05/02/2018    CREATININE 0.6 05/02/2018    ANIONGAP 6 (L) 05/02/2018     Lab Results   Component Value Date    AST 17 04/25/2018    ALT 20 04/25/2018    ALKPHOS 58 04/25/2018    BILITOT 0.6 04/25/2018    BILIDIR 0.4 (H) 04/25/2018    ALBUMIN 1.9 (L) 04/25/2018     Lab Results   Component Value Date    CALCIUM 7.8 (L) 05/02/2018    MG 2.0 04/25/2018    PHOS 2.8 04/25/2018     Lab Results   Component Value Date     (H) 07/13/2018     (H) 04/15/2018    BNP 84 04/13/2018    Lab Results   Component Value Date    WBC 10.47 05/02/2018    HGB 8.3 (L) 05/02/2018    HCT 28.6 (L) 05/02/2018     (L) 05/02/2018    GRAN 7.9 (H) 05/02/2018    GRAN 75.3 (H) 05/02/2018     Lab Results   Component Value Date    INR 1.2 04/25/2018     Lab Results   Component Value Date    CHOL 166 05/10/2017    HDL 76 (H) 05/10/2017    LDLCALC 77.8 05/10/2017    TRIG 52 04/16/2018     Lab Results   Component Value Date    HGBA1C 5.5 08/24/2017     Lab Results   Component Value Date    TSH 1.091 04/11/2018    X1QJVXA 5.8 07/07/2014          Imaging:   MRI Brain 8/29/2017  No acute infarction.  Small punctate remote infarcts noted within the right centrum semiovale, left anterior thalamus, right sultana-tatyana and bilateral cerebellar hemispheres.  Prominent patchy T2/FLAIR signal hyperintensity noted throughout the supratentorial periventricular white matter and tatyana, nonspecific but most suggestive of age advanced chronic microvascular ischemic changes.   There is mild generalized cerebral volume loss.  No midline shift or mass effect.  No hydrocephalus.  No enhancing mass lesions.  No midline shift or mass effect.  Intracranial T2 flow voids are present. Sellar regions is unremarkable. Cerebellar tonsils are in their expected location. Cervicomedullary junctions is normal.     Paranasal sinuses and mastoids are clear.  No marrow replacement process.  Orbits are within normal limits.   Impression         No acute intracranial abnormalities.  Specifically, no acute infarction or enhancing mass lesions.    Punctate remote infarct within the right centrum semiovale, left anterior thalamus and right sultana-tatyana.    Age advanced chronic microvascular ischemic changes.         EF   Date Value Ref Range Status   04/07/2018 65 55 - 65    01/03/2018 55 55 - 65      1 - Normal left ventricular systolic function (EF 55-60%).     2 - No wall motion abnormalities.     3 - Biatrial enlargement.     4 - Impaired LV relaxation, elevated LAP (grade 2 diastolic dysfunction).     5 - Normal right ventricular systolic function .     6 - The estimated PA systolic pressure is 37 mmHg.     7 - Mild mitral regurgitation.     8 - Mild tricuspid regurgitation.     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   Previous echo with normal EF, G2 diastolic dysfunction. Ongoing edema since admission but improving. Possible initial edema related to volume overload in course of fluid resuscitation for concern for sepsis but has appeared euvolemic. Suspect more consistent with venous insufficiency, hypoalbuminemia/loss of oncotic pressure with very slow resolution. BNP checked and 105 at last visit. Continue conservative measures as below, will  check blood counts (hgb down to ~8 from ~11; has baseline anemia due to chronic inflammation likely worsened by acute illness), albumin levels (decreased to less than 2 with critical illness). Would not increase lasix to try to treat as would be more prone to KERMIT and per our evaluation, do not think edema c/w diastolic heart failure    BP elevated today off amlodipine. Has been fluctuating some since discharge requiring decreased regimen, now increased again. Will switch metoprolol to coreg. Continue to hold amlodipine as could worsen edema.    Paroxysmal AF in context of critical illness/hypoxemic respiratory failure. No other evidence of clinical/symptomatic AF. Given context of acute event and her increased bleeding risk, 30d event monitor pending for evaluation of AF. [id number] at least 4 (age, female, HTN), 6 if including past remote hx of punctate infarct. HASBLED 3. If starting anticoagulation, either ASA or plavix would need to be discontinued, could further discuss with Neuro.    Plan:   -Switch metoprolol to carvedilol 6.25 bid, can uptitrate as needed  -Continue lasix 20 bid  -Continue compression stockings  -Low salt diet  -Keep daily weights to monitor fluid status  -30D event monitor  -Check CBC, albumin, Mg    Seen and discussed with Dr. Bates, return in 3 mo to reevaluate    Signed:    Demond Wolf MD  Cardiology Fellow PGY4  Beeper:  [phone]  7/27/2018 11:03 PM    Follow-up:     Future Appointments  Date Time Provider Department Center   8/6/2018 3:30 PM Lonnie Rouse MD [facility] RHEUM [place]   9/4/2018 10:50 AM MASSIEL Saeed MD [facility] OPHTHAL [place]   9/10/2018 2:00 PM LAB, HEMONC SAME DAY [facility] LAB [place]   9/10/2018 3:00 PM Sadia Joe MD [facility] BM STEWART [place]

## 2018-07-27 NOTE — PROGRESS NOTES
I have personally taken the history and examined this patient and agree with the fellow's note as stated above. Her edema appears to be primarily dependent edema with the patient sitting the majority of the day.Concur with elevation and support hose along with more frequent ambulation.

## 2018-07-29 ENCOUNTER — PATIENT MESSAGE (OUTPATIENT)
Dept: CARDIOLOGY | Facility: CLINIC | Age: 83
End: 2018-07-29

## 2018-07-29 DIAGNOSIS — I10 HYPERTENSION, UNSPECIFIED TYPE: Primary | ICD-10-CM

## 2018-07-30 ENCOUNTER — OFFICE VISIT (OUTPATIENT)
Dept: NEUROLOGY | Facility: CLINIC | Age: 83
End: 2018-07-30
Payer: MEDICARE

## 2018-07-30 DIAGNOSIS — F01.50 VASCULAR DEMENTIA WITHOUT BEHAVIORAL DISTURBANCE: ICD-10-CM

## 2018-07-30 DIAGNOSIS — I10 HYPERTENSION, UNSPECIFIED TYPE: ICD-10-CM

## 2018-07-30 PROCEDURE — 99999 PR PBB SHADOW E&M-EST. PATIENT-LVL I: CPT | Mod: PBBFAC,,, | Performed by: PSYCHIATRY & NEUROLOGY

## 2018-07-30 PROCEDURE — 99211 OFF/OP EST MAY X REQ PHY/QHP: CPT | Mod: PBBFAC | Performed by: PSYCHIATRY & NEUROLOGY

## 2018-07-30 PROCEDURE — 99213 OFFICE O/P EST LOW 20 MIN: CPT | Mod: S$PBB,,, | Performed by: PSYCHIATRY & NEUROLOGY

## 2018-07-30 RX ORDER — CARVEDILOL 6.25 MG/1
6.25 TABLET ORAL 2 TIMES DAILY WITH MEALS
Qty: 28 TABLET | Refills: 0 | Status: SHIPPED | OUTPATIENT
Start: 2018-07-30 | End: 2018-08-13

## 2018-07-30 RX ORDER — CARVEDILOL 6.25 MG/1
6.25 TABLET ORAL 2 TIMES DAILY WITH MEALS
Qty: 28 TABLET | Refills: 0 | Status: SHIPPED | OUTPATIENT
Start: 2018-07-30 | End: 2018-07-30 | Stop reason: SDUPTHER

## 2018-07-30 NOTE — LETTER
July 30, 2018      Bhargav Lee MD  [address]  [place] [address]           [facility]  [address]  [place] [address]  Phone: [phone]          Patient: Selmakirsten Rosado MD Jose J   MR Number: [id number]   YOB: 1935   Date of Visit: 7/30/2018       Dear Dr. Bhargav Lee:    Thank you for referring Selma Lux to me for evaluation. Attached you will find relevant portions of my assessment and plan of care.    If you have questions, please do not hesitate to call me. I look forward to following Selma Lux along with you.    Sincerely,    Susan Tobias MD    Enclosure  CC:  No Recipients    If you would like to receive this communication electronically, please contact [email or web address] or [phone] to request more information on [employer] Link access.    For providers and/or their staff who would like to refer a patient to [facility], please contact us through our one-stop-shop provider referral line, [facility], [place], at [phone].    If you feel you have received this communication in error or would no longer like to receive these types of communications, please e-mail [email or web address]

## 2018-07-30 NOTE — PROGRESS NOTES
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    Chief Complaint: Vascular Dementia    HPI   Vascular Dementia  Hospitalized April 5th for pneumonia that required intubation for hypoxia. Patient has not any change in memory.Patient had hypoxia but no decline in her function. Patient is rehabbed and back to her routine except ambulation.    Review of Systems   Constitutional: Positive for fatigue.   Psychiatric/Behavioral: Positive for confusion and decreased concentration.   All other systems reviewed and are negative.      Objective:      Physical Exam   Constitutional: She appears well-developed and well-nourished.   HENT:   Head: Atraumatic.   Eyes: EOM are normal. Pupils are equal, round, and reactive to light.   Neck: Normal range of motion. Neck supple.   Cardiovascular: Normal rate and regular rhythm.    Pulmonary/Chest: Effort normal and breath sounds normal.   Neurological: She is alert. She has normal strength. She displays a negative Romberg sign.   Reflex Scores:       Tricep reflexes are 2+ on the right side and 2+ on the left side.       Bicep reflexes are 2+ on the right side and 2+ on the left side.       Brachioradialis reflexes are 2+ on the right side and 2+ on the left side.       Patellar reflexes are 2+ on the right side and 2+ on the left side.       Achilles reflexes are 2+ on the right side and 2+ on the left side.  Skin: Skin is warm and dry.   Vitals reviewed.      Assessment:       Problem List Items Addressed This Visit        Neuro    Vascular dementia    Current Assessment & Plan     Patient is doing well. Her mental status is stable.             Plan:       RTC 6 months

## 2018-07-31 ENCOUNTER — EXTERNAL CHRONIC CARE MANAGEMENT (OUTPATIENT)
Dept: PRIMARY CARE CLINIC | Facility: CLINIC | Age: 83
End: 2018-07-31
Payer: MEDICARE

## 2018-07-31 PROCEDURE — 99490 CHRNC CARE MGMT STAFF 1ST 20: CPT | Mod: S$PBB,,, | Performed by: FAMILY MEDICINE

## 2018-07-31 PROCEDURE — 99490 CHRNC CARE MGMT STAFF 1ST 20: CPT | Mod: PBBFAC | Performed by: FAMILY MEDICINE

## 2018-08-01 NOTE — TELEPHONE ENCOUNTER
Called informing pt of the referral  Pt wanted it to be faxed  Fax done today to [facility].

## 2018-08-06 ENCOUNTER — OFFICE VISIT (OUTPATIENT)
Dept: RHEUMATOLOGY | Facility: CLINIC | Age: 83
End: 2018-08-06
Payer: MEDICARE

## 2018-08-06 VITALS
DIASTOLIC BLOOD PRESSURE: 74 MMHG | WEIGHT: 160.19 LBS | HEART RATE: 85 BPM | SYSTOLIC BLOOD PRESSURE: 146 MMHG | HEIGHT: 64 IN | BODY MASS INDEX: 27.35 KG/M2

## 2018-08-06 DIAGNOSIS — Z79.52 LONG TERM (CURRENT) USE OF SYSTEMIC STEROIDS: ICD-10-CM

## 2018-08-06 DIAGNOSIS — M05.79 SEROPOSITIVE RHEUMATOID ARTHRITIS OF MULTIPLE SITES: Primary | ICD-10-CM

## 2018-08-06 PROBLEM — J96.01 ACUTE HYPOXEMIC RESPIRATORY FAILURE: Status: RESOLVED | Noted: 2018-04-11 | Resolved: 2018-08-06

## 2018-08-06 PROCEDURE — 99214 OFFICE O/P EST MOD 30 MIN: CPT | Mod: S$PBB,,, | Performed by: INTERNAL MEDICINE

## 2018-08-06 PROCEDURE — 99213 OFFICE O/P EST LOW 20 MIN: CPT | Mod: PBBFAC | Performed by: INTERNAL MEDICINE

## 2018-08-06 PROCEDURE — 99999 PR PBB SHADOW E&M-EST. PATIENT-LVL III: CPT | Mod: PBBFAC,,, | Performed by: INTERNAL MEDICINE

## 2018-08-06 RX ORDER — FUROSEMIDE 20 MG/1
20 TABLET ORAL 2 TIMES DAILY
Qty: 60 TABLET | Refills: 0 | Status: SHIPPED | OUTPATIENT
Start: 2018-08-06 | End: 2018-09-10 | Stop reason: SDUPTHER

## 2018-08-06 RX ORDER — OMEPRAZOLE 20 MG/1
20 CAPSULE, DELAYED RELEASE ORAL DAILY
Status: ON HOLD | COMMUNITY
End: 2018-10-23

## 2018-08-06 NOTE — ASSESSMENT & PLAN NOTE
RA currently quiet as is tapering high dose prednsione prescribed for lungs    Advised continue HCQ and cont pred taper back to previous baseline of 5 mg/d  RTC me in 3 mo with esr and crp to assess disease activity and reconsider other DMARDs

## 2018-08-06 NOTE — ASSESSMENT & PLAN NOTE
Michael on 30 mg/d for recovery from lung infection  Can continue to taper prednisone with a goal of 5 mg/d

## 2018-08-06 NOTE — LETTER
August 6, 2018      No Recipients           [facility]  [address]  [facility] [address]  Phone: [phone]  Fax: [phone]          Patient: Selma Alonzo Lux MD   MR Number: [id number]   YOB: 1935   Date of Visit: 8/6/2018       Dear Dr. Jacoby Prakash:    Thank you for referring Selma Lux to me for evaluation. Attached you will find relevant portions of my assessment and plan of care.    If you have questions, please do not hesitate to call me. I look forward to following Selma Lux along with you.    Sincerely,    Lonnie Rouse MD    Enclosure  CC:  No Recipients    If you would like to receive this communication electronically, please contact [email or web address] or [phone] to request more information on [employer] Link access.    For providers and/or their staff who would like to refer a patient to [facility], please contact us through our one-stop-shop provider referral line, [facility] [place], at [phone].    If you feel you have received this communication in error or would no longer like to receive these types of communications, please e-mail [email or web address]

## 2018-08-06 NOTE — TELEPHONE ENCOUNTER
Patient Comment: Dr. Lux requires an additional transition prescription of LASIX as soon as possible! [facility] and she has already MISSED TWO DOSES just as the increased dose was making headway into Dr. Lux's edema. THANK YOU!

## 2018-08-06 NOTE — PROGRESS NOTES
"Subjective:       Patient ID: Selma Rosado MD Jose J is a 82 y.o. female.    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)    TKP 2009 and 2014  CTS bilaterally surgery around 2011 2012 saw Dr No who recommended MTX ; she was afraid  Then saw Dr Qureshi; he Rx  plus prednisone 5 mg/d   Hands have done well  L hand sometiems hurts at ulnar aspect at times    April saw Dr Prakash got one injection of Humira and 2 weeks later admitted to ICU with resp infection; spent a month in ICU, then weeks in rehab; and just now home since end of June  Currently on a steroid taper and on 30 mg/d  Walks with a walker  Denies dyspnea on exertion  No home O2    Tylenol usually helps pain  Hydrocodone maybe once a week  Diazepam usually once a day    Review of Systems   Constitutional: Positive for fatigue. Negative for fever and unexpected weight change.   HENT: Negative for mouth sores and trouble swallowing.         Dry mouth   Eyes:        Dry eyes   Respiratory: Positive for cough. Negative for shortness of breath.    Cardiovascular: Negative for chest pain.   Gastrointestinal: Negative for abdominal pain, constipation and diarrhea.   Skin: Negative for rash.   Neurological: Negative for headaches.   Hematological: Positive for adenopathy.        Maybe L neck node   Psychiatric/Behavioral: Negative for sleep disturbance.         Objective:   BP (!) 146/74   Pulse 85   Ht 5' 4" (1.626 m)   Wt 72.7 kg (160 lb 3.2 oz)   LMP  (LMP Unknown)   BMI 27.50 kg/m²      Physical Exam   Constitutional: She is well-developed, well-nourished, and in no distress.   HENT:   Mouth/Throat: Oropharynx is clear and moist.   Eyes: Conjunctivae are normal.   Cardiovascular: Normal rate and regular rhythm.    Pulmonary/Chest: She has no wheezes. She has no rales.   Decrease BS R base compared to L    Lymphadenopathy:     She has no cervical adenopathy. "   Skin: No rash noted.     Musculoskeletal:   No hand deformities          Physical Exam     SHEIKH-28 tender joint count: 0  SHEIKH-28 swollen joint count: 0         Lab Results   Component Value Date    SEDRATE 61 (H) 02/12/2018      Assessment:       1. Seropositive rheumatoid arthritis of multiple sites    2. Long term (current) use of systemic steroids            Plan:       Problem List Items Addressed This Visit     Long term (current) use of systemic steroids     Currenlty on 30 mg/d for recovery from lung infection  Can continue to taper prednisone with a goal of 5 mg/d         Seropositive rheumatoid arthritis of multiple sites - Primary     RA currently quiet as is tapering high dose prednsione prescribed for lungs    Advised continue HCQ and cont pred taper back to previous baseline of 5 mg/d  RTC me in 3 mo with esr and crp to assess disease activity and reconsider other DMARDs           Relevant Orders    C-reactive protein    Sedimentation rate

## 2018-08-15 ENCOUNTER — TELEPHONE (OUTPATIENT)
Dept: SPINE | Facility: CLINIC | Age: 83
End: 2018-08-15

## 2018-08-17 ENCOUNTER — TELEPHONE (OUTPATIENT)
Dept: SPINE | Facility: CLINIC | Age: 83
End: 2018-08-17

## 2018-08-20 ENCOUNTER — OFFICE VISIT (OUTPATIENT)
Dept: SPINE | Facility: CLINIC | Age: 83
End: 2018-08-20
Attending: PHYSICAL MEDICINE & REHABILITATION
Payer: MEDICARE

## 2018-08-20 VITALS — HEIGHT: 64 IN | BODY MASS INDEX: 26.29 KG/M2 | WEIGHT: 154 LBS

## 2018-08-20 DIAGNOSIS — M62.838 MUSCLE SPASM: ICD-10-CM

## 2018-08-20 DIAGNOSIS — M54.2 NECK PAIN: Primary | ICD-10-CM

## 2018-08-20 DIAGNOSIS — R53.81 PHYSICAL DECONDITIONING: ICD-10-CM

## 2018-08-20 DIAGNOSIS — M47.812 SPONDYLOSIS OF CERVICAL REGION WITHOUT MYELOPATHY OR RADICULOPATHY: ICD-10-CM

## 2018-08-20 DIAGNOSIS — G25.5 HEMICHOREA: ICD-10-CM

## 2018-08-20 PROCEDURE — 99999 PR PBB SHADOW E&M-EST. PATIENT-LVL III: CPT | Mod: PBBFAC,,, | Performed by: PHYSICAL MEDICINE & REHABILITATION

## 2018-08-20 PROCEDURE — 99214 OFFICE O/P EST MOD 30 MIN: CPT | Mod: S$PBB,,, | Performed by: PHYSICAL MEDICINE & REHABILITATION

## 2018-08-20 PROCEDURE — 99213 OFFICE O/P EST LOW 20 MIN: CPT | Mod: PBBFAC | Performed by: PHYSICAL MEDICINE & REHABILITATION

## 2018-08-20 RX ORDER — BACLOFEN 10 MG/1
10 TABLET ORAL 3 TIMES DAILY
Qty: 270 TABLET | Refills: 3 | Status: ON HOLD | OUTPATIENT
Start: 2018-08-20 | End: 2018-12-31 | Stop reason: HOSPADM

## 2018-08-20 NOTE — PROGRESS NOTES
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    Chief Complaint: Low-back Pain and Shoulder Pain (right )    Molly Jose J is an 81 yo female here for follow up of her neck pain from 7-8 years that worsened in November.  She was last seen by me on 2/22/2018 and she had botox on 2/21/2018 was continuing to have neck pain on the left.  We sent her back to PT for her neck and she was just starting.  She has seen Dr. Landa for her low back and had a Right L4/5 TFESI with Dr. Valdez.  She has left sided hemichorea.    She had trigger point in SCM on 1/17/2018.  In April saw Dr Prakash got one injection of Humira and 2 weeks later admitted to ICU with resp infection; spent a month in ICU, then weeks in rehab; and just now home since end of June. Currently on a steroid taper and on 20 mg/d.  She is following with Dr. Rouse.  She is still having neck pain, she has been using the cream and heat and that helps.  She has been using a compound cream.  She feels like the pain has been going well.  She has been working on function.  She has been trying to work on walking without the walker.  She is going to [facility].  She went there for inpatient therapy.  She has been walking with the walker.  She is using walker for distance.  At home she is using the cane and holding on to furniture.  She has been working on doing her ADL alone.  She is trying to do things herself.      MRI cervical 3/2017  There is mild anterolisthesis C2 on C3 and mild retrolisthesis of C3 on C4.  There is also mild retrolisthesis of C6 on C7.  The spondylolisthesis is likely secondary to ligamentous laxity.  There is straightening of normal cervical lordosis. Vertebral body heights are well maintained without evidence of fracture or marrow signal abnormality suspicious for an infiltrative process.  There is loss of intervertebral disc space height at C5-6 and C6-7 Visualized posterior fossa, cervical cord, and  upper thoracic cord demonstrate normal signal. Surrounding soft tissue structures demonstrate no significant abnormalities.      C2-3:  Posterior disc osteophyte complex and bilateral facet arthropathy.  Effacement of the anterior thecal sac, without flattening of the spinal cord.  No significant neural foraminal narrowing.     C3-4:  Posterior disc osteophyte complex and bilateral facet arthropathy, resulting in effacement of the anterior thecal sac, without flattening of the spinal cord.  There is moderate left-sided neuroforaminal narrowing.       C4-5:  Posterior disc osteophyte complex and bilateral facet arthropathy, resulting in effacement of the anterior thecal sac, without flattening of the spinal cord.  There is no significant neural foraminal narrowing.     C5-6:  Posterior disc osteophyte complex and bilateral facet arthropathy, resulting in effacement of the anterior thecal sac, without flattening of spinal cord.  No significant neural foraminal narrowing.     C6-7:  Posterior disc osteophyte complex and bilateral facet arthropathy, resulting in effacement of the anterior thecal sac, without flattening of the spinal cord.  No significant neural foraminal narrowing.     C7-T1:  No significant spinal canal stenosis.  No significant neural foraminal narrowing.  Impression         There is multilevel degenerative changes of the cervical spine, resulting in mild spinal canal stenosis and neuroforaminal narrowing as detailed above.      X-ray cervical  AP, neutral lateral, flexion lateral, and extension lateral radiographs of the cervical spine were obtained.  Note that the cervicothoracic junction is not optimally visualized.  There is 2 mm anterolisthesis of C2 on C3 which increases to 3 mm on the flexion radiographs.  The remainder of the posterior vertebral alignment is satisfactory.  Vertebral body heights are well-maintained.  There are advanced degenerative changes identified throughout the cervical  spine with disc space narrowing and anterior and posterior osteophyte formation.  There is solid bony fusion of the C5 and C6 vertebral bodies.  There is mild facet arthropathy noted throughout the cervical spine.  Atherosclerotic calcification is identified in the region of the carotid arteries bilaterally.  Impression         2 mm anterolisthesis of C2 on C3 which appears to be degenerative in etiology.  This increases to 3 mm on the flexion radiograph of the cervical spine.  Cervical spondylosis.  Atherosclerosis.    X-ray left shoulder 2015  Left shoulder: Significant acromiohumeral interval narrowing, this can be associated with a rotator cuff tear.  The humeral head demonstrate normal contour.  No osseous lesions, sclerosis or significant osteophyte formation.  The acromioclavicular joint   appears within normal limits.  The soft tissues appear normal.    Past Medical History:  No date: Anemia  No date: Arthritis  No date: Hashimoto's disease  No date: Hypertension  1/12/2016: IGT (impaired glucose tolerance)  No date: Joint pain  1/12/2016: Multinodular goiter    Past Surgical History:  No date: CATARACT EXTRACTION  9/29/2015: COLONOSCOPY N/A      Comment: Procedure: COLONOSCOPY;  Surgeon: FIDELINA Sanchez MD;  Location: [facility] ([address] [place] [address]);                 Service: Endoscopy;  Laterality: N/A;  No date: JOINT REPLACEMENT      Comment: right knee  12/31/2013: KNEE SURGERY Left      Comment: TKR  No date: left parotidectomy      Comment: mixed tumor  No date: SALIVARY GLAND SURGERY  No date: TONSILLECTOMY    Review of patient's family history indicates:    Hypertension                   Mother                    Prostate cancer                Father                      Comment: prostate cancer    Breast cancer                  Maternal Grandmother      Lupus                          Neg Hx                    Psoriasis                      Neg Hx                    Melanoma                        Neg Hx                    Colon cancer                   Neg Hx                      Social History    Marital status:              Spouse name:                       Years of education:                 Number of children:               Social History Main Topics    Smoking status: Never Smoker                                                                Smokeless tobacco: Never Used                        Alcohol use: No                 Comment: very seldom     Drug use: No              Sexual activity: No                      Comment: ,  age 52,         Current Outpatient Prescriptions:  amlodipine (NORVASC) 5 MG tablet, TAKE 1 TABLET DAILY, Disp: 90 tablet, Rfl: 2  baclofen (LIORESAL) 10 MG tablet, Take 1 tablet (10 mg total) by mouth 3 (three) times daily., Disp: 270 tablet, Rfl: 2  clopidogrel (PLAVIX) 75 mg tablet, Take 1 tablet (75 mg total) by mouth once daily., Disp: 90 tablet, Rfl: 1  deutetrabenazine (AUSTEDO) 9 mg Tab, Take 2 tablets by mouth 2 (two) times daily. Final titrating dose - needs initially titration pack from company, Disp: 120 tablet, Rfl: 11  diazePAM (VALIUM) 2 MG tablet, Take 1 tablet (2 mg total) by mouth every evening., Disp: 30 tablet, Rfl: 3  ferrous sulfate 325 (65 FE) MG EC tablet, Take 325 mg by mouth once daily. , Disp: , Rfl:   gabapentin (NEURONTIN) 300 MG capsule, Take 1-2 capsules (300-600 mg total) by mouth 3 (three) times daily., Disp: 540 capsule, Rfl: 1  hydrocodone-acetaminophen 5-325mg (NORCO) 5-325 mg per tablet, Take 1 tablet by mouth every 24 hours as needed for Pain., Disp: 30 tablet, Rfl: 0  hydroxychloroquine (PLAQUENIL) 200 mg tablet, TAKE 2 TABLETS ONCE DAILY, Disp: 180 tablet, Rfl: 1  linaclotide (LINZESS) 145 mcg Cap capsule, Take 1 capsule (145 mcg total) by mouth once daily., Disp: 30 capsule, Rfl: 0  memantine (NAMENDA XR) 7-14-21-28 mg C24k, Follow titration instructions 7 mg x1. 14 mg x1 week. 21 mg x1 week. 28 mg x1 week., Disp: 1 each,  Rfl: 0  memantine 28 mg CSpX, Take 1 capsule (28 mg total) by mouth once daily. START after titration pack completed., Disp: 30 capsule, Rfl: 3  montelukast (SINGULAIR) 10 mg tablet, TAKE 1 TABLET EVERY EVENING, Disp: 90 tablet, Rfl: 2  omega 3-dha-epa-fish oil 250-350-1,000 mg Cap, Take 1 capsule by mouth 2 (two) times daily., Disp: 180 capsule, Rfl: 3  predniSONE (DELTASONE) 5 MG tablet, TAKE 2 TABLETS ONCE DAILY, Disp: 180 tablet, Rfl: 0  sertraline (ZOLOFT) 25 MG tablet, Take 1 tablet (25 mg total) by mouth once daily., Disp: 90 tablet, Rfl: 1  thiamine 100 MG tablet, Take 1 tablet (100 mg total) by mouth once daily., Disp: , Rfl:     No current facility-administered medications for this visit.       Review of patient's allergies indicates:  No Known Allergies          Review of Systems   Constitution: Negative for weight gain and weight loss.   Cardiovascular: Negative for chest pain.   Respiratory: Negative for shortness of breath.    Musculoskeletal: Positive for joint pain and neck pain. Negative for joint swelling.   Gastrointestinal: Negative for abdominal pain and bowel incontinence.   Genitourinary: Negative for bladder incontinence.   Neurological: Negative for numbness.         Objective:        General: Selma is well-developed, well-nourished, appears stated age, in no acute distress, alert and oriented to time, place and person.     General    Vitals reviewed.  Constitutional: She is oriented to person, place, and time. She appears well-developed and well-nourished.   HENT:   Head: Normocephalic and atraumatic.   Pulmonary/Chest: Effort normal.   Neurological: She is alert and oriented to person, place, and time.   Psychiatric: She has a normal mood and affect. Her behavior is normal. Judgment and thought content normal.     General Musculoskeletal Exam   Gait: abnormal (walker)     Back (L-Spine & T-Spine) / Neck (C-Spine) Exam     Tenderness Right paramedian tenderness of the Sacrum. Left  paramedian tenderness of the Sacrum.     Spinal Sensation   Right Side Sensation  C-Spine Level: normal   L-Spine Level: normal  S-Spine Level: normal  Left Side Sensation  C-Spine Level: normal  L-Spine Level: normal  S-Spine Level: normal    Other She has no scoliosis .  Spinal Kyphosis:  Absent  Left Hand/Wrist Exam     Inspection   Effusion: Hand -  present          Left Shoulder Exam     Range of Motion   Active abduction: 90   Passive abduction: 150     Tests & Signs   Rotator Cuff Painful Arc/Range: moderate    Comments:  Tender over anterior pec      Muscle Strength   Right Upper Extremity   Wrist extension: 5/5/5   : 4/5/5   Finger Flexors:  5/5  Left Upper Extremity  Wrist extension: 5/5/5   :  4/5/5   Finger Flexors:  5/5  Right Lower Extremity   Hip Flexion: 5/5   Quadriceps:  5/5   Anterior tibial:  5/5/5  Left Lower Extremity   Hip Flexion: 5/5   Quadriceps:  5/5   Anterior tibial:  5/5/5     Reflexes     Left Side  Biceps:  2+  Triceps:  2+  Brachioradialis:  2+  Quadriceps:  2+  Achilles:  2+  Left Davis's Sign:  Absent  Babinski Sign:  absent    Right Side   Biceps:  2+  Triceps:  2+  Brachioradialis:  2+  Quadriceps:  2+  Achilles:  2+  Right Davis's Sign:  absent  Babinski Sign:  absent    Vascular Exam     Right Pulses        Carotid:                  2+    Left Pulses        Carotid:                  2+              Assessment:       1. Neck pain    2. Muscle spasm    3. Hemichorea    4. Spondylosis of cervical region without myelopathy or radiculopathy           Plan:       Orders Placed This Encounter    baclofen (LIORESAL) 10 MG tablet     1.  She had botox for her neck and that really helped.  She is not complaining of neck pain currently  2. Continue PT/ OT at [facility], she feels like she is doing well with getting her strength better.  She will continue therapy  3.  Baclofen 10mg po TID  4.  continue gabapentin to 300 mg po TID,  5.  She is not having as much hand and arm  shaking on the left.  She feels like the chorea is better.  The botox was really helpful  6.  She is walking with walker  7.  Continue to use compounded cream  8.  RTC 3 months      Follow-up: Follow-up in about 3 months (around 11/20/2018). If there are any questions prior to this, the patient was instructed to contact the office.

## 2018-08-24 ENCOUNTER — PROCEDURE VISIT (OUTPATIENT)
Dept: NEUROLOGY | Facility: CLINIC | Age: 83
End: 2018-08-24
Payer: MEDICARE

## 2018-08-24 VITALS
HEART RATE: 81 BPM | HEIGHT: 64 IN | DIASTOLIC BLOOD PRESSURE: 93 MMHG | SYSTOLIC BLOOD PRESSURE: 156 MMHG | WEIGHT: 160.69 LBS | BODY MASS INDEX: 27.43 KG/M2

## 2018-08-24 DIAGNOSIS — G24.3 CERVICAL DYSTONIA: Primary | ICD-10-CM

## 2018-08-24 PROCEDURE — 64616 CHEMODENERV MUSC NECK DYSTON: CPT | Mod: 50,PBBFAC | Performed by: PSYCHIATRY & NEUROLOGY

## 2018-08-24 PROCEDURE — 95874 GUIDE NERV DESTR NEEDLE EMG: CPT | Mod: 26,S$PBB,, | Performed by: PSYCHIATRY & NEUROLOGY

## 2018-08-24 PROCEDURE — 95874 GUIDE NERV DESTR NEEDLE EMG: CPT | Mod: PBBFAC | Performed by: PSYCHIATRY & NEUROLOGY

## 2018-08-24 PROCEDURE — 64616 CHEMODENERV MUSC NECK DYSTON: CPT | Mod: 50,S$PBB,, | Performed by: PSYCHIATRY & NEUROLOGY

## 2018-08-27 ENCOUNTER — TELEPHONE (OUTPATIENT)
Dept: NEUROLOGY | Facility: CLINIC | Age: 83
End: 2018-08-27

## 2018-08-27 DIAGNOSIS — G24.3 CERVICAL DYSTONIA: ICD-10-CM

## 2018-08-27 DIAGNOSIS — F01.50 VASCULAR DEMENTIA WITHOUT BEHAVIORAL DISTURBANCE: Primary | ICD-10-CM

## 2018-08-27 NOTE — TELEPHONE ENCOUNTER
----- Message from Ga Harris sent at 8/27/2018  8:45 AM CDT -----  Contact: Selma Lux  Good Morning,     I have OT orders for this patient. Patient states they are for Driving eval. Can you please edit order to reflect this please?    Thank you so much,    Ga

## 2018-08-29 ENCOUNTER — OFFICE VISIT (OUTPATIENT)
Dept: OBSTETRICS AND GYNECOLOGY | Facility: CLINIC | Age: 83
End: 2018-08-29
Attending: OBSTETRICS & GYNECOLOGY
Payer: MEDICARE

## 2018-08-29 VITALS
BODY MASS INDEX: 28.38 KG/M2 | WEIGHT: 166.25 LBS | DIASTOLIC BLOOD PRESSURE: 72 MMHG | HEIGHT: 64 IN | SYSTOLIC BLOOD PRESSURE: 164 MMHG

## 2018-08-29 DIAGNOSIS — D25.0 SUBMUCOUS LEIOMYOMA OF UTERUS: ICD-10-CM

## 2018-08-29 DIAGNOSIS — Z01.419 WELL FEMALE EXAM WITH ROUTINE GYNECOLOGICAL EXAM: Primary | ICD-10-CM

## 2018-08-29 PROCEDURE — 99213 OFFICE O/P EST LOW 20 MIN: CPT | Mod: PBBFAC,25 | Performed by: OBSTETRICS & GYNECOLOGY

## 2018-08-29 PROCEDURE — 99999 PR PBB SHADOW E&M-EST. PATIENT-LVL III: CPT | Mod: PBBFAC,,, | Performed by: OBSTETRICS & GYNECOLOGY

## 2018-08-29 PROCEDURE — G0101 CA SCREEN;PELVIC/BREAST EXAM: HCPCS | Mod: PBBFAC | Performed by: OBSTETRICS & GYNECOLOGY

## 2018-08-29 PROCEDURE — G0101 CA SCREEN;PELVIC/BREAST EXAM: HCPCS | Mod: S$PBB,,, | Performed by: OBSTETRICS & GYNECOLOGY

## 2018-08-29 NOTE — PROGRESS NOTES
BOTULINUM TOXIN PROCEDURE NOTE FOR CERVICAL DYSTONIA/ SPASMODIC TORTICOLLIS      Diagnosis: CERVICAL DYSTONIA/ SPASMODIC TORTICOLLIS  She has recurrence of pain, spasm and abnormal posturing over the past several weeks. She is experiencing a worsening of cervical dystonia/ spasmodic torticollis which interferes with activities of daily living (ADL).       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  Given the focal nature of increased tone and poor response to oral medications, She is a good candidate for botulinum injection to her neck.  She will need 200 units of toxin    The goal of the injections will be to reduce pain and abnormal posturing.  The procedure was explained to patient and a consent form was signed.      BOTOX INJECTION PROCEDURE:    Using a 30 gauge injection needle and aseptic technique the following muscles were identified and injected with botox .     Dilution: 100:1      For cervical dystonia/ spasmodic torticollis:      RIGHT    Muscle group Units given # injection sites   Splenius capitus  50    Levator scapulae     Upper trapezius     Sternocleidomastoid  25    Scalenus anticus      Scalenus medius     Scalenus posterior     Longissimus capitus      rhomboid                         LEFT        Muscle group Units given # injection sites   Splenius capitus  25    Levator scapulae     Upper trapezius     Sternocleidomastoid  25    Scalenus anticus      Scalenus medius     Scalenus posterior     Longissimus capitus      rhomboid                    Total amount of botox used:  125 units  Total amount of botox wasted:  75 units      Patient tolerated the procedure without any difficulty and there were no complications.

## 2018-08-30 NOTE — PROGRESS NOTES
SUBJECTIVE:   82 y.o. female   for routine gyn exam. No LMP recorded (lmp unknown). Patient is postmenopausal..  She has no unusual complaints.  Known h/o fibroids.  No PMB.  No HRT.  Considering being sexually soon.         Past Medical History:   Diagnosis Date    Anemia     Arthritis     Bilateral hand pain 3/14/2018    Branch retinal vein occlusion, left eye 2015    Chronic bilateral low back pain without sciatica 3/23/2017    Cognitive communication deficit 2017    Cystoid macular edema, left eye 2015    Cystoid macular edema, left eye 2015    DJD (degenerative joint disease) of cervical spine 5/15/2013    Fatty liver 2014    Goiter 2018    Hashimoto's disease     Hemichorea 2017    Hypertension     IBS (irritable bowel syndrome) 2017    IGT (impaired glucose tolerance) 2016    Iron deficiency anemia secondary to inadequate dietary iron intake 2013    Joint pain     Keratoconjunctivitis sicca of both eyes not specified as Sjogren's 2016    Leiomyoma of uterus, unspecified 2014    Long QT interval 2016    Due to medication (plaquenil)     Macular edema 2015    Multinodular goiter 2016    Neuropathy 2017    Plaquenil causing adverse effect in therapeutic use 10/7/2016    Pneumococcal vaccine refused 2016    Pneumonia due to Streptococcus pneumoniae 2018    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/2015    Retinal macroaneurysm of left eye 2015    s/p Right Total knee replacement 5/15/2013    Scoliosis of thoracic spine 5/15/2013    Small vessel disease, cerebrovascular 2017    Stroke     Vascular dementia 2017     Past Surgical History:   Procedure Laterality Date    CATARACT EXTRACTION      EYE SURGERY      JOINT REPLACEMENT      right knee    KNEE SURGERY Left 2013    TKR    left parotidectomy      mixed tumor    SALIVARY GLAND SURGERY       TONSILLECTOMY       Social History     Socioeconomic History    Marital status:      Spouse name: Not on file    Number of children: Not on file    Years of education: Not on file    Highest education level: Not on file   Social Needs    Financial resource strain: Not on file    Food insecurity - worry: Not on file    Food insecurity - inability: Not on file    Transportation needs - medical: Not on file    Transportation needs - non-medical: Not on file   Occupational History    Not on file   Tobacco Use    Smoking status: Never Smoker    Smokeless tobacco: Never Used   Substance and Sexual Activity    Alcohol use: No     Alcohol/week: 0.0 oz     Comment: very seldom     Drug use: No    Sexual activity: No     Comment: ,  age 52,    Other Topics Concern    Are you pregnant or think you may be? Not Asked    Breast-feeding Not Asked   Social History Narrative    Not on file     Family History   Problem Relation Age of Onset    Hypertension Mother     Heart disease Mother     Prostate cancer Father         prostate cancer    Cancer Father     Breast cancer Maternal Grandmother     Lupus Neg Hx     Psoriasis Neg Hx     Melanoma Neg Hx     Colon cancer Neg Hx      OB History    Para Term  AB Living   3 2 2   1 2   SAB TAB Ectopic Multiple Live Births   1              # Outcome Date GA Lbr Mehdi/2nd Weight Sex Delivery Anes PTL Lv   3 SAB            2 Term            1 Term                     Current Outpatient Medications   Medication Sig Dispense Refill    aspirin (ECOTRIN) 81 MG EC tablet Take 81 mg by mouth once daily.      baclofen (LIORESAL) 10 MG tablet Take 1 tablet (10 mg total) by mouth 3 (three) times daily. 270 tablet 3    carvedilol (COREG) 6.25 MG tablet Take 1 tablet (6.25 mg total) by mouth 2 (two) times daily with meals. 60 tablet 11    clopidogrel (PLAVIX) 75 mg tablet 1 tablet (75 mg total) by Per G Tube route once daily. 90 tablet 1     doxepin (ZONALON) 5 % cream       gabapentin (NEURONTIN) 300 MG capsule Take 300 mg by mouth 3 (three) times daily.      hydrocodone-acetaminophen 5-325mg (NORCO) 5-325 mg per tablet 1 tablet by Per G Tube route every 8 (eight) hours as needed for Pain (severe pain). 30 tablet 0    hydroxychloroquine (PLAQUENIL) 200 mg tablet 2 tablets (400 mg total) by Per G Tube route once daily. 180 tablet 1    magnesium oxide (MAG-OX) 400 mg tablet Take 400 mg by mouth once daily.      omeprazole (PRILOSEC) 20 MG capsule Take 20 mg by mouth. Per peg tube      pantoprazole (PROTONIX) 40 mg GrPS 1 packet (40 mg total) by Per G Tube route once daily. (Patient taking differently: 20 mg by Per G Tube route once daily. ) 30 packet 11    predniSONE (DELTASONE) 5 MG tablet Take 1 tablet (5 mg total) by mouth HT ICU Setup as needed. Take 4 in the am and 3 in the evening for 2 weeks - then  Take 3 in the am and 3 in the evening for 2 weeks - then  Take 3 in the am and 2 in the evening for 2 weeks - consult with rheumatology for further advice 210 tablet 0    thiamine 100 MG tablet Take 100 mg by mouth once daily.      furosemide (LASIX) 20 MG tablet Take 1 tablet (20 mg total) by mouth 2 (two) times daily. (Patient taking differently: Take 80 mg by mouth 2 (two) times daily. ) 60 tablet 0     No current facility-administered medications for this visit.      Allergies: Patient has no known allergies.     ROS:  Constitutional: no weight loss, weight gain, fever, fatigue  Eyes:  No vision changes, glasses/contacts  ENT/Mouth: No ulcers, sinus problems, ears ringing, headache  Cardiovascular: No inability to lie flat, chest pain, exercise intolerance, swelling, heart palpitations  Respiratory: No wheezing, coughing blood, +shortness of breath, + cough  Gastrointestinal: No diarrhea, bloody stool, nausea/vomiting, constipation, gas, hemorrhoids  Genitourinary: No blood in urine, painful urination, urgency of urination, frequency of  "urination, incomplete emptying, incontinence, abnormal bleeding, painful periods, heavy periods, vaginal discharge, vaginal odor, painful intercourse, sexual problems, bleeding after intercourse.  Musculoskeletal: No muscle weakness.  H/o recent CVA- therapy  Skin/Breast: No painful breasts, nipple discharge, masses, rash, ulcers  Neurological: No passing out, seizures, numbness, headache  Endocrine: No diabetes, hypothyroid, hyperthyroid, hot flashes, hair loss, abnormal hair growth, ance  Psychiatric: No depression, crying  Hematologic: No bruises, bleeding, swollen lymph nodes, anemia.      OBJECTIVE:   The patient appears well, alert, oriented x 3, in no distress.  BP (!) 164/72   Ht 5' 4" (1.626 m)   Wt 75.4 kg (166 lb 3.6 oz)   LMP  (LMP Unknown)   BMI 28.53 kg/m²   NECK: no thyromegaly, trachea midline  SKIN: no acne, striae, hirsutism  CHEST: CTAB  CV: RRR  BREAST EXAM: breasts appear normal, no suspicious masses, no skin or nipple changes or axillary nodes  ABDOMEN: no hernias, masses, or hepatosplenomegaly  GENITALIA: normal external genitalia, no erythema, no discharge  URETHRA: normal urethra, normal urethral meatus  VAGINA: Normal  CERVIX: no lesions or cervical motion tenderness  UTERUS: 10-12 week size, non-tender  ADNEXA: no mass, fullness, tenderness      ASSESSMENT:   1. Well female exam with routine gynecological exam     2. Submucous leiomyoma of uterus         PLAN:      Return to clinic in 1 year  "

## 2018-08-31 ENCOUNTER — EXTERNAL CHRONIC CARE MANAGEMENT (OUTPATIENT)
Dept: PRIMARY CARE CLINIC | Facility: CLINIC | Age: 83
End: 2018-08-31
Payer: MEDICARE

## 2018-08-31 PROCEDURE — 99490 CHRNC CARE MGMT STAFF 1ST 20: CPT | Mod: S$PBB,,, | Performed by: FAMILY MEDICINE

## 2018-08-31 PROCEDURE — 99490 CHRNC CARE MGMT STAFF 1ST 20: CPT | Mod: PBBFAC | Performed by: FAMILY MEDICINE

## 2018-09-04 ENCOUNTER — OFFICE VISIT (OUTPATIENT)
Dept: OPHTHALMOLOGY | Facility: CLINIC | Age: 83
End: 2018-09-04
Payer: MEDICARE

## 2018-09-04 DIAGNOSIS — H35.352 CYSTOID MACULAR EDEMA, LEFT EYE: ICD-10-CM

## 2018-09-04 DIAGNOSIS — M05.79 SEROPOSITIVE RHEUMATOID ARTHRITIS OF MULTIPLE SITES: ICD-10-CM

## 2018-09-04 DIAGNOSIS — H35.012 RETINAL MACROANEURYSM OF LEFT EYE: ICD-10-CM

## 2018-09-04 DIAGNOSIS — T37.8X5D HYDROXYCHLOROQUINE CAUSING ADVERSE EFFECT IN THERAPEUTIC USE, SUBSEQUENT ENCOUNTER: Primary | ICD-10-CM

## 2018-09-04 PROCEDURE — 99213 OFFICE O/P EST LOW 20 MIN: CPT | Mod: PBBFAC | Performed by: OPHTHALMOLOGY

## 2018-09-04 PROCEDURE — 92226 PR SPECIAL EYE EXAM, SUBSEQUENT: CPT | Mod: S$PBB,LT,, | Performed by: OPHTHALMOLOGY

## 2018-09-04 PROCEDURE — 92014 COMPRE OPH EXAM EST PT 1/>: CPT | Mod: S$PBB,,, | Performed by: OPHTHALMOLOGY

## 2018-09-04 PROCEDURE — 99999 PR PBB SHADOW E&M-EST. PATIENT-LVL III: CPT | Mod: PBBFAC,,, | Performed by: OPHTHALMOLOGY

## 2018-09-04 PROCEDURE — 92134 CPTRZ OPH DX IMG PST SGM RTA: CPT | Mod: PBBFAC | Performed by: OPHTHALMOLOGY

## 2018-09-04 PROCEDURE — 92226 PR SPECIAL EYE EXAM, SUBSEQUENT: CPT | Mod: 50,PBBFAC | Performed by: OPHTHALMOLOGY

## 2018-09-04 NOTE — PROGRESS NOTES
HPI     Eye Problem      Additional comments: yearly check          Comments     DLS 8/28/17- About a month ago she started getting pain OS. This happens off and on. Today she rates the pain as 4-5 on pain scale but sometimes it can be higher than that. I feels as if something is loose OS          OCT - OD - No ME  OS - CME resolved    HVF - some constriction OU  ?reliability      A/P    1) Cystoid Macular Edema OS:   -ANA and hard exudates resolved, BP well-controlled in notes   -Leaking of ANA and another discrete point on previous FA    -S/P focal laser OS 1/12/15   stable    2) Plaquenil Screening   -Pt  started on plaquenil for RA 1/15 - 200mg BID   -No evidence of bullseye maculopathy, retinal outer layers appear normal OU    -Will get visual field testing once #1 resolved    3-PCIOL OU  Monovision OS    4. KIMBERLY  AT's        12 months OCT  Will forego HVF in future because of poor reliability

## 2018-09-07 PROCEDURE — 93272 ECG/REVIEW INTERPRET ONLY: CPT | Mod: ,,, | Performed by: INTERNAL MEDICINE

## 2018-09-10 ENCOUNTER — PATIENT MESSAGE (OUTPATIENT)
Dept: SPINE | Facility: CLINIC | Age: 83
End: 2018-09-10

## 2018-09-10 ENCOUNTER — PATIENT MESSAGE (OUTPATIENT)
Dept: INTERNAL MEDICINE | Facility: CLINIC | Age: 83
End: 2018-09-10

## 2018-09-10 RX ORDER — FUROSEMIDE 20 MG/1
20 TABLET ORAL 2 TIMES DAILY
Qty: 60 TABLET | Refills: 0 | Status: SHIPPED | OUTPATIENT
Start: 2018-09-10 | End: 2018-09-28 | Stop reason: SDUPTHER

## 2018-09-10 RX ORDER — GABAPENTIN 300 MG/1
300 CAPSULE ORAL 3 TIMES DAILY
Status: CANCELLED | OUTPATIENT
Start: 2018-09-10

## 2018-09-10 RX ORDER — GABAPENTIN 300 MG/1
300 CAPSULE ORAL 3 TIMES DAILY
Qty: 90 CAPSULE | Refills: 2 | Status: SHIPPED | OUTPATIENT
Start: 2018-09-10 | End: 2018-09-28 | Stop reason: SDUPTHER

## 2018-09-21 ENCOUNTER — LAB VISIT (OUTPATIENT)
Dept: LAB | Facility: HOSPITAL | Age: 83
End: 2018-09-21
Attending: FAMILY MEDICINE
Payer: MEDICARE

## 2018-09-21 ENCOUNTER — OFFICE VISIT (OUTPATIENT)
Dept: INTERNAL MEDICINE | Facility: CLINIC | Age: 83
End: 2018-09-21
Attending: FAMILY MEDICINE
Payer: MEDICARE

## 2018-09-21 VITALS
WEIGHT: 163.31 LBS | SYSTOLIC BLOOD PRESSURE: 126 MMHG | DIASTOLIC BLOOD PRESSURE: 50 MMHG | TEMPERATURE: 98 F | BODY MASS INDEX: 27.88 KG/M2 | HEIGHT: 64 IN

## 2018-09-21 DIAGNOSIS — I10 HYPERTENSION, ESSENTIAL: Primary | ICD-10-CM

## 2018-09-21 DIAGNOSIS — F01.50 VASCULAR DEMENTIA WITHOUT BEHAVIORAL DISTURBANCE: ICD-10-CM

## 2018-09-21 DIAGNOSIS — I48.0 AF (PAROXYSMAL ATRIAL FIBRILLATION): ICD-10-CM

## 2018-09-21 DIAGNOSIS — Z86.73 HISTORY OF STROKE: ICD-10-CM

## 2018-09-21 DIAGNOSIS — M05.79 SEROPOSITIVE RHEUMATOID ARTHRITIS OF MULTIPLE SITES: ICD-10-CM

## 2018-09-21 DIAGNOSIS — R35.0 URINARY FREQUENCY: ICD-10-CM

## 2018-09-21 DIAGNOSIS — J18.9 PNEUMONIA OF BOTH LUNGS DUE TO INFECTIOUS ORGANISM, UNSPECIFIED PART OF LUNG: ICD-10-CM

## 2018-09-21 DIAGNOSIS — Z79.52 LONG TERM (CURRENT) USE OF SYSTEMIC STEROIDS: ICD-10-CM

## 2018-09-21 LAB
BILIRUB UR QL STRIP: NEGATIVE
CLARITY UR REFRACT.AUTO: CLEAR
COLOR UR AUTO: NORMAL
GLUCOSE UR QL STRIP: NEGATIVE
HGB UR QL STRIP: NEGATIVE
KETONES UR QL STRIP: NEGATIVE
LEUKOCYTE ESTERASE UR QL STRIP: NEGATIVE
MICROSCOPIC COMMENT: NORMAL
NITRITE UR QL STRIP: NEGATIVE
PH UR STRIP: 6 [PH] (ref 5–8)
PROT UR QL STRIP: NEGATIVE
SP GR UR STRIP: 1.01 (ref 1–1.03)
URN SPEC COLLECT METH UR: NORMAL
UROBILINOGEN UR STRIP-ACNC: NEGATIVE EU/DL

## 2018-09-21 PROCEDURE — 99999 PR PBB SHADOW E&M-EST. PATIENT-LVL III: CPT | Mod: PBBFAC,,, | Performed by: FAMILY MEDICINE

## 2018-09-21 PROCEDURE — 81001 URINALYSIS AUTO W/SCOPE: CPT

## 2018-09-21 PROCEDURE — 87086 URINE CULTURE/COLONY COUNT: CPT

## 2018-09-21 PROCEDURE — 99213 OFFICE O/P EST LOW 20 MIN: CPT | Mod: PBBFAC | Performed by: FAMILY MEDICINE

## 2018-09-21 PROCEDURE — 99214 OFFICE O/P EST MOD 30 MIN: CPT | Mod: S$PBB,,, | Performed by: FAMILY MEDICINE

## 2018-09-21 NOTE — Clinical Note
Dr. 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 Thank you

## 2018-09-21 NOTE — PROGRESS NOTES
Subjective:       Patient ID: Selma Rosado MD Jose J is a 83 y.o. female.    Chief Complaint: Follow-up    HPI  Review of Systems   Constitutional: Positive for fatigue. Negative for chills, fever and unexpected weight change.   HENT: Negative for congestion and trouble swallowing.    Eyes: Negative for redness and visual disturbance.   Respiratory: Positive for shortness of breath. Negative for cough and chest tightness.    Cardiovascular: Negative for chest pain, palpitations and leg swelling.   Gastrointestinal: Negative for abdominal pain and blood in stool.   Genitourinary: Positive for frequency. Negative for difficulty urinating, hematuria and menstrual problem.   Musculoskeletal: Positive for arthralgias, gait problem and joint swelling. Negative for back pain, myalgias and neck pain.   Skin: Negative for color change and rash.   Neurological: Negative for tremors, speech difficulty, weakness, numbness and headaches.   Hematological: Negative for adenopathy. Does not bruise/bleed easily.   Psychiatric/Behavioral: Positive for sleep disturbance. Negative for behavioral problems and confusion. The patient is not nervous/anxious.        Objective:      Physical Exam   Constitutional: She is oriented to person, place, and time. She appears well-developed and well-nourished.   HENT:   Head: Normocephalic and atraumatic.   Eyes: Conjunctivae are normal. No scleral icterus.   Neck: Normal range of motion. Neck supple.   Cardiovascular: Normal rate, normal heart sounds and intact distal pulses. An irregularly irregular rhythm present. Exam reveals no gallop and no friction rub.   No murmur heard.  Pulmonary/Chest: Effort normal. No respiratory distress. She has decreased breath sounds. She has no wheezes. She has no rales.   Abdominal: She exhibits no distension and no abdominal bruit. There is no tenderness.   Musculoskeletal: She exhibits edema. She exhibits no deformity.   Neurological: She is alert and  oriented to person, place, and time. She displays no tremor. No cranial nerve deficit. Coordination and gait normal.   Skin: Skin is warm and dry. No rash noted. She is not diaphoretic. No erythema.   Psychiatric: She has a normal mood and affect. Her behavior is normal. Judgment and thought content normal.   Nursing note and vitals reviewed.      Assessment:       1. Hypertension, essential    2. Seropositive rheumatoid arthritis of multiple sites    3. Long term (current) use of systemic steroids    4. AF (paroxysmal atrial fibrillation)    5. History of stroke    6. Pneumonia of both lungs due to infectious organism, unspecified part of lung    7. Vascular dementia without behavioral disturbance    8. Urinary frequency        Plan:   Selma was seen today for follow-up.    Diagnoses and all orders for this visit:    Hypertension, essential    Seropositive rheumatoid arthritis of multiple sites    Long term (current) use of systemic steroids    AF (paroxysmal atrial fibrillation)    History of stroke    Pneumonia of both lungs due to infectious organism, unspecified part of lung    Vascular dementia without behavioral disturbance    Urinary frequency      See meds, orders, follow up, routing and instructions sections of encounter.  The patient is in for followup.  She did miss one appointment.  I did review her   Neurology, Rheumatology visits and OB/GYN visits since seeing me last.  She has   no new complaints.  She is currently on prednisone 5+5 and unclear about her   Lasix use.  She states she is taking two tablets twice daily.  Her prescription   should be one tablet twice daily.  Her amlodipine was discontinued.  She is on   carvedilol.  She is having no cardiopulmonary complaints at this time other than   some swelling in the legs, which is improving.  She is not wearing her   compression stockings.  Daughter states having trouble getting them.    I reviewed and went over her medication usage with her once  again.  The   following recommendations are made:  Continue to wean prednisone to 5 mg daily   end dose and continue with that according to Rheumatology, keep followup   appointments with Cardiology, Rheumatology, etc. in coming weeks, follow up with   me in four months, check laboratory with urinalysis today.  We will send a   chart copy to Dr. Jean concerning the patient's incontinence and nocturia.    I asked her dose her Lasix in the morning and then again at noon rather than   waiting later in the day and we will check a urinalysis at this time.  Her   urinary incontinence is keeping her up at night.    She has no new pulmonary complaints.  Her chest x-ray was reviewed.  She had no   recommendations made by her vascular neurologist.  Notify me for any significant   change of status.  I did note she had some puffiness in the face, probably due   to steroids.  She states this is getting a little bit better.  She had been on   significantly elevated doses while she was in the hospital.      SELENA/ALLAN  dd: 09/21/2018 18:07:01 (CDT)  td: 09/22/2018 05:22:38 (CDT)  Doc ID   #[id number]  Job ID #[id number]    CC:

## 2018-09-22 LAB
BACTERIA UR CULT: NORMAL
BACTERIA UR CULT: NORMAL

## 2018-09-28 ENCOUNTER — TELEPHONE (OUTPATIENT)
Dept: INTERNAL MEDICINE | Facility: CLINIC | Age: 83
End: 2018-09-28

## 2018-09-28 ENCOUNTER — PATIENT MESSAGE (OUTPATIENT)
Dept: INTERNAL MEDICINE | Facility: CLINIC | Age: 83
End: 2018-09-28

## 2018-09-28 ENCOUNTER — PATIENT MESSAGE (OUTPATIENT)
Dept: CARDIOLOGY | Facility: CLINIC | Age: 83
End: 2018-09-28

## 2018-09-28 DIAGNOSIS — I10 HYPERTENSION, UNSPECIFIED TYPE: ICD-10-CM

## 2018-09-28 DIAGNOSIS — M05.79 SEROPOSITIVE RHEUMATOID ARTHRITIS OF MULTIPLE SITES: Primary | ICD-10-CM

## 2018-09-28 RX ORDER — PREDNISONE 5 MG/1
5 TABLET ORAL DAILY
Qty: 90 TABLET | Refills: 1 | Status: ON HOLD
Start: 2018-09-28 | End: 2018-10-25

## 2018-09-28 RX ORDER — HYDROXYCHLOROQUINE SULFATE 200 MG/1
400 TABLET, FILM COATED ORAL DAILY
Qty: 180 TABLET | Refills: 1
Start: 2018-09-28 | End: 2018-10-15 | Stop reason: SDUPTHER

## 2018-09-28 RX ORDER — MONTELUKAST SODIUM 10 MG/1
10 TABLET ORAL NIGHTLY
Qty: 90 TABLET | Refills: 3 | Status: SHIPPED | OUTPATIENT
Start: 2018-09-28 | End: 2019-05-21 | Stop reason: SDUPTHER

## 2018-09-28 RX ORDER — CARVEDILOL 6.25 MG/1
6.25 TABLET ORAL 2 TIMES DAILY WITH MEALS
Qty: 180 TABLET | Refills: 3 | Status: SHIPPED | OUTPATIENT
Start: 2018-09-28 | End: 2018-09-28 | Stop reason: SDUPTHER

## 2018-09-28 RX ORDER — HYDROCODONE BITARTRATE AND ACETAMINOPHEN 5; 325 MG/1; MG/1
1 TABLET ORAL EVERY 12 HOURS PRN
Qty: 14 TABLET | Refills: 0 | Status: SHIPPED | OUTPATIENT
Start: 2018-09-28 | End: 2018-10-03

## 2018-09-28 RX ORDER — GABAPENTIN 300 MG/1
300 CAPSULE ORAL 3 TIMES DAILY
Qty: 90 CAPSULE | Refills: 2 | Status: SHIPPED | OUTPATIENT
Start: 2018-09-28 | End: 2018-09-28 | Stop reason: SDUPTHER

## 2018-09-28 RX ORDER — CARVEDILOL 6.25 MG/1
6.25 TABLET ORAL 2 TIMES DAILY WITH MEALS
Qty: 60 TABLET | Refills: 11 | Status: CANCELLED | OUTPATIENT
Start: 2018-09-28 | End: 2019-09-28

## 2018-09-28 RX ORDER — PREDNISONE 5 MG/1
5 TABLET ORAL
Qty: 210 TABLET | Refills: 0
Start: 2018-09-28

## 2018-09-28 RX ORDER — GABAPENTIN 300 MG/1
300 CAPSULE ORAL 3 TIMES DAILY
Qty: 90 CAPSULE | Refills: 2 | Status: ON HOLD | OUTPATIENT
Start: 2018-09-28 | End: 2018-12-31 | Stop reason: SDUPTHER

## 2018-09-28 RX ORDER — CARVEDILOL 6.25 MG/1
6.25 TABLET ORAL 2 TIMES DAILY WITH MEALS
Qty: 60 TABLET | Refills: 0 | Status: SHIPPED | OUTPATIENT
Start: 2018-09-28 | End: 2018-10-03 | Stop reason: SDUPTHER

## 2018-09-28 RX ORDER — FUROSEMIDE 20 MG/1
20 TABLET ORAL 2 TIMES DAILY
Qty: 60 TABLET | Refills: 0 | Status: ON HOLD | OUTPATIENT
Start: 2018-09-28 | End: 2018-10-23 | Stop reason: DRUGHIGH

## 2018-09-28 RX ORDER — HYDROCODONE BITARTRATE AND ACETAMINOPHEN 5; 325 MG/1; MG/1
1 TABLET ORAL EVERY 12 HOURS PRN
Qty: 14 TABLET | Refills: 0 | Status: SHIPPED | OUTPATIENT
Start: 2018-09-28 | End: 2018-09-28 | Stop reason: SDUPTHER

## 2018-09-28 NOTE — TELEPHONE ENCOUNTER
----- Message from Britney Corral sent at 9/28/2018 11:58 AM CDT -----  Contact: pt  Pt would like a 14 day supply of her Coreg 6.25 mg sent to [facility].  LOV 7/27/18 Dr. Wolf    Thanks

## 2018-09-30 ENCOUNTER — EXTERNAL CHRONIC CARE MANAGEMENT (OUTPATIENT)
Dept: PRIMARY CARE CLINIC | Facility: CLINIC | Age: 83
End: 2018-09-30
Payer: MEDICARE

## 2018-09-30 PROCEDURE — 99490 CHRNC CARE MGMT STAFF 1ST 20: CPT | Mod: S$PBB,,, | Performed by: FAMILY MEDICINE

## 2018-09-30 PROCEDURE — 99490 CHRNC CARE MGMT STAFF 1ST 20: CPT | Mod: PBBFAC | Performed by: FAMILY MEDICINE

## 2018-10-01 NOTE — ASSESSMENT & PLAN NOTE
--Suspect secondary to severe bilateral pneumonia in the setting of immunosuppression; possibly  vs reaction from Humira   --Severely worsened over first week of admission despite CAP treatment.  --s/p emergent intubation on 4/11 upon MICU admission for severe hypoxemia and severe respiratory distress.  --Bronchoscopy on 4/11 with wash; culture with few WBCs, no organisms.  --Antibiotics broadened to vancomycin, zosyn, bactrim, and posaconazole initally; stopped now  --Blood, sputum, and urine cultures negative from 4/10 and 4/18  --KOH prep negative, AFB stain with no acid fast bacilli noted, fungal culture negative  --Aspergillus, Histoplasma, and Cryptococcal antigen in blood is negative. Legionella and blasomyces negative.   --Extubated on 4/22  - PO pred 60 qd for possibility of , duo nebs PRN for wheezing  - On Bactrim for PJP PPx currently  - Pt currently on 3L NC and sating well, with slightly decreased lung sounds in BLLL  - stepped down to hospital medicine 04/25/18  - on NC at 2 L  - monitor   - incentive spirometry      WDL

## 2018-10-03 ENCOUNTER — OFFICE VISIT (OUTPATIENT)
Dept: CARDIOLOGY | Facility: CLINIC | Age: 83
End: 2018-10-03
Payer: MEDICARE

## 2018-10-03 ENCOUNTER — DOCUMENTATION ONLY (OUTPATIENT)
Dept: REHABILITATION | Facility: HOSPITAL | Age: 83
End: 2018-10-03

## 2018-10-03 VITALS
OXYGEN SATURATION: 93 % | SYSTOLIC BLOOD PRESSURE: 147 MMHG | BODY MASS INDEX: 28.38 KG/M2 | DIASTOLIC BLOOD PRESSURE: 72 MMHG | HEART RATE: 92 BPM | WEIGHT: 166.25 LBS | HEIGHT: 64 IN

## 2018-10-03 DIAGNOSIS — Z86.73 HISTORY OF STROKE: ICD-10-CM

## 2018-10-03 DIAGNOSIS — I49.3 FREQUENT PVCS: ICD-10-CM

## 2018-10-03 DIAGNOSIS — R06.02 SHORTNESS OF BREATH: Primary | ICD-10-CM

## 2018-10-03 DIAGNOSIS — I70.0 AORTIC ATHEROSCLEROSIS: ICD-10-CM

## 2018-10-03 DIAGNOSIS — I10 HYPERTENSION, ESSENTIAL: ICD-10-CM

## 2018-10-03 PROCEDURE — 99214 OFFICE O/P EST MOD 30 MIN: CPT | Mod: S$PBB,,, | Performed by: NURSE PRACTITIONER

## 2018-10-03 PROCEDURE — 99999 PR PBB SHADOW E&M-EST. PATIENT-LVL IV: CPT | Mod: PBBFAC,,, | Performed by: NURSE PRACTITIONER

## 2018-10-03 PROCEDURE — 99214 OFFICE O/P EST MOD 30 MIN: CPT | Mod: PBBFAC | Performed by: NURSE PRACTITIONER

## 2018-10-03 PROCEDURE — 93010 ELECTROCARDIOGRAM REPORT: CPT | Mod: S$PBB,,, | Performed by: INTERNAL MEDICINE

## 2018-10-03 PROCEDURE — 93005 ELECTROCARDIOGRAM TRACING: CPT | Mod: PBBFAC | Performed by: INTERNAL MEDICINE

## 2018-10-03 RX ORDER — CARVEDILOL 12.5 MG/1
12.5 TABLET ORAL 2 TIMES DAILY WITH MEALS
Qty: 180 TABLET | Refills: 3 | Status: SHIPPED | OUTPATIENT
Start: 2018-10-03 | End: 2019-10-17 | Stop reason: SDUPTHER

## 2018-10-03 NOTE — PROGRESS NOTES
Ms. Lux is a patient of Dr. Wolf and was last seen in [facility] 7/27/2018.    Subjective:   Patient ID:  Selma Rosado MD Jose J is a 83 y.o. 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 Her weight is up about 10 lbs since her visit 6 weeks ago.  Her pulse ox is 93% today and she is typically 95-99% on RA.  She ran out of the coreg for 2-3 days.  Reports a single episode of PND.  She recently had a complicated prolonged hospital admission in which she presented in respiratory failure and required intubation.  She went into atrial fibrillation while in the hospital briefly but spontaneously converted.  She had a negative event monitor.      Review of Systems   Constitution: Negative for decreased appetite, diaphoresis, weakness, malaise/fatigue, weight gain and weight loss.   Eyes: Negative for visual disturbance.   Cardiovascular: Negative for chest pain, claudication, dyspnea on exertion, irregular heartbeat, leg swelling, near-syncope, orthopnea, palpitations, paroxysmal nocturnal dyspnea and syncope.        Denies chest pressure   Respiratory: Negative for cough, hemoptysis, shortness of breath, sleep disturbances due to breathing and snoring.    Endocrine: Negative for cold intolerance and heat intolerance.   Hematologic/Lymphatic: Negative for bleeding problem. Does not bruise/bleed easily.   Musculoskeletal: Negative for myalgias.   Gastrointestinal: Negative for bloating, abdominal pain, anorexia, change in bowel habit, constipation, diarrhea, nausea and vomiting.   Neurological: Negative for difficulty with concentration, disturbances in coordination, excessive daytime  sleepiness, dizziness, headaches, light-headedness, loss of balance and numbness.   Psychiatric/Behavioral: The patient does not have insomnia.      Allergies and current medications updated and reviewed:  Review of patient's allergies indicates:  No Known Allergies  Current Outpatient Medications   Medication Sig    aspirin (ECOTRIN) 81 MG EC tablet Take 81 mg by mouth once daily.    baclofen (LIORESAL) 10 MG tablet Take 1 tablet (10 mg total) by mouth 3 (three) times daily.    carvedilol (COREG) 6.25 MG tablet Take 1 tablet (6.25 mg total) by mouth 2 (two) times daily with meals.    clopidogrel (PLAVIX) 75 mg tablet 1 tablet (75 mg total) by Per G Tube route once daily. (Patient taking differently: Take 75 mg by mouth once daily. )    furosemide (LASIX) 20 MG tablet Take 1 tablet (20 mg total) by mouth 2 (two) times daily.    gabapentin (NEURONTIN) 300 MG capsule Take 1 capsule (300 mg total) by mouth 3 (three) times daily.    hydroxychloroquine (PLAQUENIL) 200 mg tablet 2 tablets (400 mg total) by Per G Tube route once daily. (Patient taking differently: Take 400 mg by mouth once daily. )    magnesium oxide (MAG-OX) 400 mg tablet Take 400 mg by mouth once daily.    montelukast (SINGULAIR) 10 mg tablet Take 1 tablet (10 mg total) by mouth every evening.    omeprazole (PRILOSEC) 20 MG capsule Take 20 mg by mouth once daily.     pantoprazole (PROTONIX) 40 mg GrPS 1 packet (40 mg total) by Per G Tube route once daily. (Patient taking differently: Take 20 mg by mouth once daily. )    predniSONE (DELTASONE) 5 MG tablet Take 1 tablet (5 mg total) by mouth once daily. Take 4 in the am and 3 in the evening for 2 weeks - then  Take 3 in the am and 3 in the evening for 2 weeks - then  Take 3 in the am and 2 in the evening for 2 weeks - consult with rheumatology for further advice    thiamine 100 MG tablet Take 100 mg by mouth once daily.     No current facility-administered medications for this visit.   "      Objective:     Right Arm BP - Sittin/69 (10/03/18 1413)  Left Arm BP - Sittin/72 (10/03/18 1413)    BP (!) 147/72 (BP Location: Left arm, Patient Position: Sitting, BP Method: Large (Automatic))   Pulse 92   Ht 5' 4" (1.626 m)   Wt 75.4 kg (166 lb 3.6 oz)   LMP  (LMP Unknown)   SpO2 (!) 93%   BMI 28.53 kg/m²       Physical Exam   Constitutional: She is oriented to person, place, and time. Vital signs are normal. She appears well-developed and well-nourished. She is active. No distress.   HENT:   Head: Normocephalic and atraumatic.   Eyes: Conjunctivae and lids are normal. No scleral icterus.   Neck: Neck supple. Normal carotid pulses, no hepatojugular reflux and no JVD present. Carotid bruit is not present.   Cardiovascular: Normal rate, S1 normal, S2 normal and intact distal pulses. An irregularly irregular rhythm present. Frequent extrasystoles are present. PMI is not displaced. Exam reveals no gallop and no friction rub.   No murmur heard.  Pulses:       Carotid pulses are 2+ on the right side, and 2+ on the left side.       Radial pulses are 2+ on the right side, and 2+ on the left side.        Dorsalis pedis pulses are 2+ on the right side, and 2+ on the left side.        Posterior tibial pulses are 2+ on the right side, and 2+ on the left side.   Pulmonary/Chest: Effort normal and breath sounds normal. No respiratory distress. She has no decreased breath sounds. She has no wheezes. She has no rhonchi. She has no rales. She exhibits no tenderness.   Fine crackles RLL   Abdominal: Soft. Normal appearance and bowel sounds are normal. She exhibits no distension, no fluid wave, no abdominal bruit, no ascites and no pulsatile midline mass. There is no hepatosplenomegaly. There is no tenderness.   Musculoskeletal: She exhibits edema (Trace BLE).   Neurological: She is alert and oriented to person, place, and time. Gait normal.   Skin: Skin is warm, dry and intact. No rash noted. She is not " diaphoretic. Nails show no clubbing.   Psychiatric: She has a normal mood and affect. Her speech is normal and behavior is normal. Judgment and thought content normal. Cognition and memory are normal.   Nursing note and vitals reviewed.      Chemistry        Component Value Date/Time     (L) 05/02/2018 0353    K 4.0 05/02/2018 0353     05/02/2018 0353    CO2 22 (L) 05/02/2018 0353    BUN 14 05/02/2018 0353    CREATININE 0.6 05/02/2018 0353     05/02/2018 0353        Component Value Date/Time    CALCIUM 7.8 (L) 05/02/2018 0353    ALKPHOS 58 04/25/2018 0832    AST 17 04/25/2018 0832    ALT 20 04/25/2018 0832    BILITOT 0.6 04/25/2018 0832    ESTGFRAFRICA >60.0 05/02/2018 0353    EGFRNONAA >60.0 05/02/2018 0353        Lab Results   Component Value Date    HGBA1C 5.5 08/24/2017     Recent Labs   Lab  05/10/17   0956   04/11/18   1425   04/13/18   1139   04/15/18   1126  04/16/18   0357   07/13/18   1027   10/03/18   1612   WHITE BLOOD CELL COUNT   --    < >  35.14 H   < >   --    < >   --   19.56 H   < >   --    < >  15.83 H   HEMOGLOBIN   --    < >  9.4 L   < >   --    < >   --   7.7 L   < >   --    < >  12.4   HEMATOCRIT   --    < >  29.6 L   < >   --    < >   --   24.4 L   < >   --    < >  39.4   MCV   --    < >  77 L   < >   --    < >   --   76 L   < >   --    < >  90   PLATELETS   --    < >  352 H   < >   --    < >   --   340   < >   --    < >  234   BNP   --    < >   --    --   84   --   153 H   --    --   105 H   --    --    TSH  1.514   < >  1.091   --    --    --    --    --    --    --    --    --    CHOLESTEROL  166   --    --    --    --    --    --    --    --    --    --    --    HDL  76 H   --    --    --    --    --    --    --    --    --    --    --    LDL CHOLESTEROL  77.8   --    --    --    --    --    --    --    --    --    --    --    TRIGLYCERIDES  61   --    --    --    --    --    --   52   --    --    --    --    HDL/CHOLESTEROL RATIO  45.8   --    --    --    --    --     --    --    --    --    --    --     < > = values in this interval not displayed.       Recent Labs   Lab  04/22/18   0348  04/23/18   0235  04/24/18   0235  04/25/18   0832   INR  1.2  1.3 H  1.3 H  1.2        Test(s) Reviewed  I have reviewed the following in detail:  [] Stress test   [] Angiography   [x] Echocardiogram   [x] Labs   [] Other:         Assessment/Plan:     Shortness of breath  Comments:  Euvolemic on exam. BNP to exclude covert HF. H/o inflamatory anemia. Check CBC. Suspect underlying pulmonary etiology based on h/o respiratory failure/hypoxia. PFTs and pulmonary consult. CXR to evaluate basilar crackles that don't clear with cough  Orders:  -     Complete PFT with bronchodilator; Future  -     PULSE OXIMETRY WITH REST - PULM; Future  -     Ambulatory referral to Pulmonology  -     CBC auto differential; Future; Expected date: 10/03/2018  -     X-Ray Chest PA And Lateral; Future; Expected date: 10/03/2018  -     Brain natriuretic peptide; Future; Expected date: 10/03/2018    Aortic atherosclerosis    Hypertension, essential  Comments:  BP not at goal <130/80. Increase coreg to 12.5mg BID  Orders:  -     carvedilol (COREG) 12.5 MG tablet; Take 1 tablet (12.5 mg total) by mouth 2 (two) times daily with meals.  Dispense: 180 tablet; Refill: 3    Frequent PVCs  Comments:  15-20% of beats are PVCs based on rhythm strip. Increase carvedilol to 12.5mg BID  Orders:  -     EKG 12-lead    History of stroke    The patient was discussed and examined by Dr. Gallagher    Follow-up in about 3 months (around 1/3/2019).

## 2018-10-03 NOTE — PROGRESS NOTES
PHYSICAL THERAPY DISCHARGE SUMMARY     Name: Selma lAonzo Lux MD  Clinic Number: [id number]    Diagnosis:  Encounter Diagnoses   Name Primary?    Neck pain      Upper extremity weakness        Physician: Dee Thomson, *  Treatment Orders: PT Eval and Treat    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    Hypertension     IBS (irritable bowel syndrome) 6/21/2017    IGT (impaired glucose tolerance) 1/12/2016    Iron deficiency anemia secondary to inadequate dietary iron intake 6/24/2013    Joint pain     Keratoconjunctivitis sicca of both eyes not specified as Sjogren's 7/29/2016    Leiomyoma of uterus, unspecified 9/16/2014    Long QT interval 6/29/2016    Due to medication (plaquenil)     Macular edema 1/12/2015    Multinodular goiter 1/12/2016    Neuropathy 8/23/2017    Plaquenil causing adverse effect in therapeutic use 10/7/2016    Pneumococcal vaccine refused 8/17/2016    Pneumonia due to Streptococcus pneumoniae 4/5/2018    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/2015    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 Pt did not attend therapy for appropriate frequency to achieve goals.     Goals Not achieved and why: Pt has not scheduled any additional visits and has not returned to therapy.     Discharge reason : Pt has not re-scheduled further follow-up sessions and POC has     G-CODE: Discharge g-code not filed due to discharge note being documentation only. Upon eval Pt was at CK (40<60%)  with g-code goal set at CK (40 < 60%).    Short Term GOALS:  In 4 weeks, pt. will:  Report decreased neck pain pain < / = 5 /10 at worst to increase tolerance for ADLs  Increased cervical B sidebending AROM by > / = 10 degrees in order to tolerate sidelying positions   Pt will be able to perform 10 upper cervical flexion nods in supine without fatigue in order to improve strength for proper posture  Pt will report compliance and tolerance to HEP     Long Term GOALS:  In 8 weeks, pt. Will:  Report decreased neck pain pain < / = 3 /10 at worst to increase tolerance for ADLs  Pt will increase cervical flexion MMT strength by > / = 1 grade in order to improve cervical stability  Pt will be able to hold deep neck flexion for 10 sec without fatigue in order to improve upright posture  ncreased cervical B rotation AROM by > / = 10 degrees in order to tolerate supine and sidelying positions to improve sleep quality   - be independent with HEP and SX management     PLAN   This patient is discharged from Physical Therapy Services.       Laura Michael, PT

## 2018-10-05 ENCOUNTER — HOSPITAL ENCOUNTER (OUTPATIENT)
Dept: PULMONOLOGY | Facility: CLINIC | Age: 83
Discharge: HOME | End: 2018-10-05
Payer: MEDICARE

## 2018-10-05 ENCOUNTER — HOSPITAL ENCOUNTER (OUTPATIENT)
Dept: RADIOLOGY | Facility: HOSPITAL | Age: 83
Discharge: HOME | End: 2018-10-05
Attending: NURSE PRACTITIONER
Payer: MEDICARE

## 2018-10-05 DIAGNOSIS — R06.02 SHORTNESS OF BREATH: ICD-10-CM

## 2018-10-05 PROCEDURE — 71046 X-RAY EXAM CHEST 2 VIEWS: CPT | Mod: 26,,, | Performed by: RADIOLOGY

## 2018-10-05 PROCEDURE — 71046 X-RAY EXAM CHEST 2 VIEWS: CPT | Mod: TC

## 2018-10-08 ENCOUNTER — TELEPHONE (OUTPATIENT)
Dept: INTERNAL MEDICINE | Facility: CLINIC | Age: 83
End: 2018-10-08

## 2018-10-08 ENCOUNTER — OFFICE VISIT (OUTPATIENT)
Dept: PULMONOLOGY | Facility: CLINIC | Age: 83
End: 2018-10-08
Payer: MEDICARE

## 2018-10-08 VITALS
DIASTOLIC BLOOD PRESSURE: 66 MMHG | BODY MASS INDEX: 30.71 KG/M2 | OXYGEN SATURATION: 90 % | SYSTOLIC BLOOD PRESSURE: 122 MMHG | HEART RATE: 88 BPM | HEIGHT: 62 IN | WEIGHT: 166.88 LBS

## 2018-10-08 DIAGNOSIS — R93.89 ABNORMAL CXR: Primary | ICD-10-CM

## 2018-10-08 DIAGNOSIS — R06.03 ACUTE RESPIRATORY DISTRESS: Primary | ICD-10-CM

## 2018-10-08 DIAGNOSIS — M05.79 SEROPOSITIVE RHEUMATOID ARTHRITIS OF MULTIPLE SITES: ICD-10-CM

## 2018-10-08 DIAGNOSIS — Z79.52 LONG TERM (CURRENT) USE OF SYSTEMIC STEROIDS: ICD-10-CM

## 2018-10-08 DIAGNOSIS — I70.0 AORTIC ATHEROSCLEROSIS: ICD-10-CM

## 2018-10-08 PROCEDURE — 99999 PR PBB SHADOW E&M-EST. PATIENT-LVL IV: CPT | Mod: PBBFAC,,, | Performed by: INTERNAL MEDICINE

## 2018-10-08 PROCEDURE — 99215 OFFICE O/P EST HI 40 MIN: CPT | Mod: S$PBB,,, | Performed by: INTERNAL MEDICINE

## 2018-10-08 PROCEDURE — 99214 OFFICE O/P EST MOD 30 MIN: CPT | Mod: PBBFAC | Performed by: INTERNAL MEDICINE

## 2018-10-08 RX ORDER — PREDNISONE 10 MG/1
20 TABLET ORAL DAILY
Qty: 60 TABLET | Refills: 3 | Status: ON HOLD | OUTPATIENT
Start: 2018-10-08 | End: 2018-10-25

## 2018-10-08 RX ORDER — FUROSEMIDE 40 MG/1
40 TABLET ORAL 2 TIMES DAILY
Qty: 60 TABLET | Refills: 3 | Status: ON HOLD | OUTPATIENT
Start: 2018-10-08 | End: 2018-10-25

## 2018-10-08 RX ORDER — LEVOFLOXACIN 500 MG/1
500 TABLET, FILM COATED ORAL DAILY
Qty: 10 TABLET | Refills: 0 | Status: ON HOLD | OUTPATIENT
Start: 2018-10-08 | End: 2018-10-23

## 2018-10-08 NOTE — TELEPHONE ENCOUNTER
Patient saw Pulmonary today, I was however unable to schedule her with Infectious disease until 10/22.  She is on the waitlist and maybe your MA could give them a call to see if the can get her in earlier for Infectious Disease.

## 2018-10-08 NOTE — PATIENT INSTRUCTIONS
· Increase lasix (furosemide) to 40 mg twice daily.  · Increased prednisone to 30 mg daily.  · Take Levaquin (levofloxacin) once daily.  · Bloodwork today.  · Return to Pulmonary Clinic next Wednesday (October 17)  · If you feel worse, come to the Emergency Room with expectation of being admitted to the hospital.

## 2018-10-08 NOTE — TELEPHONE ENCOUNTER
----- Message from Jered Gallagher MD sent at 10/7/2018  1:43 PM CDT -----  Dr. Lee:    I saw Dr. Lux in clinic with Sadia Donovan.  Her lung exam, particularly in the right fields, sounded quite abnormal, and though she had a complicated admission in the spring with respiratory failure and mechanical ventilation, I thought it abnormal enough to warrant imaging.  Her CXR now looks quite abnormal with bilateral airspace disease.  She did not appear toxic on examination, so I find it hard to imagine her currently having an active bilateral pneumonia.    Your help would be greatly appreciated.    Thanks,  Jered

## 2018-10-12 ENCOUNTER — TELEPHONE (OUTPATIENT)
Dept: PULMONOLOGY | Facility: CLINIC | Age: 83
End: 2018-10-12

## 2018-10-12 DIAGNOSIS — M05.79 SEROPOSITIVE RHEUMATOID ARTHRITIS OF MULTIPLE SITES: ICD-10-CM

## 2018-10-12 PROBLEM — R06.03 ACUTE RESPIRATORY DISTRESS: Status: ACTIVE | Noted: 2018-10-12

## 2018-10-12 RX ORDER — HYDROXYCHLOROQUINE SULFATE 200 MG/1
400 TABLET, FILM COATED ORAL DAILY
Qty: 180 TABLET | Refills: 1
Start: 2018-10-12

## 2018-10-12 NOTE — TELEPHONE ENCOUNTER
Patient feels the 40mg bid furosemide is too much for her. She states she was extremely dry yesterday. Also felt tired and weak. States she does feel somewhat better today, but more sob on exertion. Feels she is not remembering to take deep breaths when she should. Patient would like to reduce the furosemide. What are your recommendations? Patient does have an appointment with us Wednesday of next week.

## 2018-10-12 NOTE — PROGRESS NOTES
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  She is a lifelong non-smoker.  Although she has not been seen in Pulmonary Clinic before, she was hospitalized at [facility] [place] earlier this year including a prolonged ICU stay for acute hypoxemic respiratory failure that necessitated intubation and ventilatory support due to bilateral alveolar infiltrates on CXR.  Work up at that time was non-diagnostic, including bronchoscopy with BAL on 4/11/2018 showed no evidence of alveolar hemorrhage and cultures/cytology were all negative.  Natriuretic peptide was < 200 during that time, although echocardiogram did show some diastolic dysfunction.  She was treated empirically for bacterial pneumonia, as well as receiving a prolonged steroid taper for possible connective tissue-related lung disease.  This episode occurred within a week of her first dose of Humira, so it was thought possibly to be an inflammatory reaction to the medication.  She required a prolonged inpatient stay followed by inpatient rehabilitation but eventually returned near to her good functioning baseline.  She has been on a gradual steroid taper by 5 mg every couple weeks and has now been on prednisone 10 mg daily for the last two weeks.  CXR on 07/13/2018 showed nearly complete clearing of the parenchymal opacities.      She reports acceptable recovery until ~2 weeks ago when she noted onset of coughing and exertional dyspnea.  The cough has been associated with increased phlegm production that has been very difficult to clear despite intense efforts.  The sputum is mostly white in color but is occasionally yellow/green.  In the last two days before this visit, she has been taking Mucinex in hopes of loosening up the sputum to make it  easier for her to expectorate.  She denies hemoptysis.  The dyspnea has progressed to the point that she gets short of breath even walking across the room.  She denies fever, chills, night sweats, or pleurisy.  She may have a little more ankle swelling but this is not as bad as it was when in the hospital.  CXR done on 10/5/2018 shows increased alveolar opacities bilaterally (more prominent in both upper lobes) with a peripheral predilection.  She attempted pulmonary function tests at that time but was unable to perform them because of respiratory symptoms.    Other history is notable for hypertension; past stroke with neuropathy; rheumatoid arthritis treated with Plaquenil and prednisone (5-10 mg daily) for the last 4-5 years.  She has previously taken Humira as outlined above but has never taken gold, methotrexate, or other agents.      Review of Systems   Constitutional: Positive for activity change and fatigue. Negative for fever, chills, weight gain and night sweats.   HENT: Negative for trouble swallowing and voice change.    Respiratory: Positive for cough, sputum production, shortness of breath and dyspnea on extertion. Negative for hemoptysis, wheezing, orthopnea, asthma nighttime symptoms and pleurisy.    Cardiovascular: Positive for leg swelling. Negative for chest pain and palpitations.   Gastrointestinal: Positive for acid reflux.       Objective:      Physical Exam   Constitutional: She is oriented to person, place, and time. No distress.   Cardiovascular: Normal rate, regular rhythm and normal heart sounds. Exam reveals no gallop.   No murmur heard.  Pulmonary/Chest: No respiratory distress. She has no wheezes. She has no rhonchi. She has rales. Chest wall is not dull to percussion.   Room air SpO2 is 90% at rest.   Musculoskeletal: She exhibits edema.   Lymphadenopathy: No supraclavicular adenopathy is present.     She has no cervical adenopathy.   Neurological: She is alert and oriented to person,  place, and time.   Skin: No cyanosis. Nails show no clubbing.   Psychiatric: Judgment and thought content normal.   Nursing note and vitals reviewed.    Personal Diagnostic Review    CXR from 10/05/2018 was personally reviewed with the patient.  In addition to the significant bilateral alveolar opacities, there is aortic calcification on the digital subtraction images =>    FINDINGS:  Patchy areas of consolidation bilaterally.  Heart is normal there is aortic plaque.  There is DJD.    Pressure: Multifocal pneumonia.  Pulmonary edema also possible.      Assessment:       1. Acute respiratory distress    2. Long term (current) use of systemic steroids    3. Seropositive rheumatoid arthritis of multiple sites        Outpatient Encounter Medications as of 10/8/2018   Medication Sig Dispense Refill    aspirin (ECOTRIN) 81 MG EC tablet Take 81 mg by mouth once daily.      baclofen (LIORESAL) 10 MG tablet Take 1 tablet (10 mg total) by mouth 3 (three) times daily. 270 tablet 3    carvedilol (COREG) 12.5 MG tablet Take 1 tablet (12.5 mg total) by mouth 2 (two) times daily with meals. 180 tablet 3    clopidogrel (PLAVIX) 75 mg tablet 1 tablet (75 mg total) by Per G Tube route once daily. (Patient taking differently: Take 75 mg by mouth once daily. ) 90 tablet 1    furosemide (LASIX) 20 MG tablet Take 1 tablet (20 mg total) by mouth 2 (two) times daily. 60 tablet 0    gabapentin (NEURONTIN) 300 MG capsule Take 1 capsule (300 mg total) by mouth 3 (three) times daily. 90 capsule 2    hydroxychloroquine (PLAQUENIL) 200 mg tablet 2 tablets (400 mg total) by Per G Tube route once daily. (Patient taking differently: Take 400 mg by mouth once daily. ) 180 tablet 1    magnesium oxide (MAG-OX) 400 mg tablet Take 400 mg by mouth once daily.      montelukast (SINGULAIR) 10 mg tablet Take 1 tablet (10 mg total) by mouth every evening. 90 tablet 3    omeprazole (PRILOSEC) 20 MG capsule Take 20 mg by mouth once daily.        pantoprazole (PROTONIX) 40 mg GrPS 1 packet (40 mg total) by Per G Tube route once daily. (Patient taking differently: Take 20 mg by mouth once daily. ) 30 packet 11    predniSONE (DELTASONE) 5 MG tablet Take 1 tablet (5 mg total) by mouth once daily. Take 4 in the am and 3 in the evening for 2 weeks - then  Take 3 in the am and 3 in the evening for 2 weeks - then  Take 3 in the am and 2 in the evening for 2 weeks - consult with rheumatology for further advice 90 tablet 1    thiamine 100 MG tablet Take 100 mg by mouth once daily.      furosemide (LASIX) 40 MG tablet Take 1 tablet (40 mg total) by mouth 2 (two) times daily. 60 tablet 3    levoFLOXacin (LEVAQUIN) 500 MG tablet Take 1 tablet (500 mg total) by mouth once daily. 10 tablet 0    predniSONE (DELTASONE) 10 MG tablet Take 2 tablets (20 mg total) by mouth once daily. 60 tablet 3     No facility-administered encounter medications on file as of 10/8/2018.      Orders Placed This Encounter   Procedures    C-REACTIVE PROTEIN     Standing Status:   Future     Number of Occurrences:   1     Standing Expiration Date:   12/7/2019    Sedimentation rate     Standing Status:   Future     Number of Occurrences:   1     Standing Expiration Date:   12/7/2019    RHEUMATOID FACTOR     Standing Status:   Future     Number of Occurrences:   1     Standing Expiration Date:   12/7/2019    CYCLIC CITRUL PEPTIDE ANTIBODY, IGG     Standing Status:   Future     Number of Occurrences:   1     Standing Expiration Date:   12/7/2019    CBC auto differential     Standing Status:   Future     Number of Occurrences:   1     Standing Expiration Date:   12/7/2019    Comprehensive metabolic panel     Standing Status:   Future     Number of Occurrences:   1     Standing Expiration Date:   12/7/2019     Plan:   I am worried that Ms. Lux is having a recurrence of the same problem that occurred last April.  Findings now include progressive respiratory symptoms, bilateral infiltrates,  "and borderline hypoxemia while breathing room air.  It is possible that this is related to the tapering dose of her steroids or could be related to pulmonary edema from diastolic dysfunction.  We will check bloodwork at this visit and bump her steroid dose back up (to 30 mg daily) and increase her furosemide to 40 mg twice daily.  If she gets worse in the meantime or shows no improvement with these changes, she will likely require hospitalization for bronchoscopy and further work up for opportunistic pathogens or connective tissue disease affecting the lung.    · Increase lasix (furosemide) to 40 mg twice daily.  · Increased prednisone to 30 mg daily.  · Take Levaquin (levofloxacin) once daily.  · Bloodwork today.  · Return to Pulmonary Clinic next Wednesday (October 17)  · If you feel worse, come to the Emergency Room with expectation of being admitted to the hospital      Problem List Items Addressed This Visit     Acute respiratory distress - Primary    Overview     Prior ICU stay in April 2018 for ARDS with bilateral infiltrates and respiratory failure.  Bronchoscopy non-diagnostic.  Gradually cleared/improved with steroid taper and CXR in July 2018 was clear.    Repeat CXR in October shows bilateral infiltrates worrisome for recurrence of April illness.           Relevant Orders    C-REACTIVE PROTEIN (Completed)    Sedimentation rate (Completed)    RHEUMATOID FACTOR (Completed)    CYCLIC CITRUL PEPTIDE ANTIBODY, IGG (Completed)    CBC auto differential (Completed)    Comprehensive metabolic panel (Completed)    Aortic atherosclerosis    Overview     CT Chest Abdomen Pelvis-4/11/2018  "Aorta: Normal in course and caliber, with mild atherosclerotic plaque."  Hx stroke, Medications:  Plavix 75 mg         Long term (current) use of systemic steroids    Overview     · Has taken prednisone 5 mg/d since 2012 when dx'd with RA.  · Recently tapering down slowly after April hospitalization.         Seropositive " rheumatoid arthritis of multiple sites    Overview     Dx 2012 with Dr No, then Corazonm  HCQ since 2012  Prednisone 5 mg/d since 2012  Humira one dose April 2018 followed by ICU admission for pnemonia and resp failure             Please note that this was a 60 minute visit with greater than 50% of time spent in counseling the patient, as well as reviewing events and studies from her previous hospitalization earlier this year.    I have copied Dr. Lee (Primary Care) on this note.    Baldomero Francis MD  Pulmonary/Critical Care Medicine       Post-clinic Laboratory Addendum:    Results for HERNANDEZSARA MD (MRN [id number])    Ref. Range 10/8/2018 15:23   WBC Latest Ref Range: 3.90 - 12.70 K/uL 16.50 (H)   Hemoglobin Latest Ref Range: 12.0 - 16.0 g/dL 11.6 (L)   Hematocrit Latest Ref Range: 37.0 - 48.5 % 38.5   Platelets Latest Ref Range: 150 - 350 K/uL 328   Sed Rate Latest Ref Range: 0 - 36 mm/Hr 72 (H)   Sodium Latest Ref Range: 136 - 145 mmol/L 134 (L)   Potassium Latest Ref Range: 3.5 - 5.1 mmol/L 3.9   Chloride Latest Ref Range: 95 - 110 mmol/L 97   CO2 Latest Ref Range: 23 - 29 mmol/L 27   Anion Gap Latest Ref Range: 8 - 16 mmol/L 10   BUN, Bld Latest Ref Range: 8 - 23 mg/dL 15   Creatinine Latest Ref Range: 0.5 - 1.4 mg/dL 1.1   eGFR if African American Latest Ref Range: >60 mL/min/1.73 m^2 54.4 (A)   Glucose Latest Ref Range: 70 - 110 mg/dL 330 (H)   Calcium Latest Ref Range: 8.7 - 10.5 mg/dL 9.2   Alkaline Phosphatase Latest Ref Range: 55 - 135 U/L 80   Total Protein Latest Ref Range: 6.0 - 8.4 g/dL 6.7   Albumin Latest Ref Range: 3.5 - 5.2 g/dL 2.5 (L)   Total Bilirubin Latest Ref Range: 0.1 - 1.0 mg/dL 0.5   AST Latest Ref Range: 10 - 40 U/L 17   ALT Latest Ref Range: 10 - 44 U/L 16   CRP Latest Ref Range: 0.0 - 8.2 mg/L 179.7 (H)   CCP Antibodies Latest Ref Range: <5.0 U/mL <0.5   Rheumatoid Factor Latest Ref Range: 0.0 - 15.0 IU/mL 14.0     Hyperglycemia even on lower dose of steroids noted.   It is likely that increased steroid dose will exacerbate this further.    Baldomero Francis MD  Pulmonary/Critical Care Medicine

## 2018-10-12 NOTE — TELEPHONE ENCOUNTER
----- Message from Starr Lagos sent at 10/12/2018 11:11 AM CDT -----  Contact:        Pt   [phone]  Needs Advice    Reason for call:  Pt  Fingers are bent , pt requesting refill on medication  hydroxychloroquine (PLAQUENIL) 200 mg tablet   .  Pt also, having SOB          Communication Preference:  Phone      Additional Information:

## 2018-10-12 NOTE — TELEPHONE ENCOUNTER
----- Message from Amador Macdonald sent at 10/12/2018  3:48 PM CDT -----  Contact: Patient [phone]  Is this a refill or new RX:  Refill    RX name and strength: hydroxychloroquine (PLAQUENIL) 200 mg tablet      Pharmacy name and phone # [id number] - [place] [address] AT [facility] [address] [phone] (Phone)  [phone] (Fax)

## 2018-10-15 DIAGNOSIS — M05.79 SEROPOSITIVE RHEUMATOID ARTHRITIS OF MULTIPLE SITES: ICD-10-CM

## 2018-10-15 RX ORDER — HYDROXYCHLOROQUINE SULFATE 200 MG/1
400 TABLET, FILM COATED ORAL DAILY
Qty: 180 TABLET | Refills: 1 | Status: CANCELLED
Start: 2018-10-15

## 2018-10-16 RX ORDER — HYDROXYCHLOROQUINE SULFATE 200 MG/1
400 TABLET, FILM COATED ORAL DAILY
Qty: 60 TABLET | Refills: 2 | Status: SHIPPED | OUTPATIENT
Start: 2018-10-16 | End: 2018-10-17 | Stop reason: SDUPTHER

## 2018-10-16 RX ORDER — HYDROXYCHLOROQUINE SULFATE 200 MG/1
400 TABLET, FILM COATED ORAL DAILY
Start: 2018-10-16 | End: 2018-10-16

## 2018-10-17 ENCOUNTER — OFFICE VISIT (OUTPATIENT)
Dept: PULMONOLOGY | Facility: CLINIC | Age: 83
End: 2018-10-17
Payer: MEDICARE

## 2018-10-17 VITALS
BODY MASS INDEX: 30.59 KG/M2 | OXYGEN SATURATION: 92 % | WEIGHT: 166.25 LBS | HEART RATE: 85 BPM | SYSTOLIC BLOOD PRESSURE: 120 MMHG | HEIGHT: 62 IN | DIASTOLIC BLOOD PRESSURE: 72 MMHG

## 2018-10-17 DIAGNOSIS — R06.03 ACUTE RESPIRATORY DISTRESS: Primary | ICD-10-CM

## 2018-10-17 DIAGNOSIS — M05.79 SEROPOSITIVE RHEUMATOID ARTHRITIS OF MULTIPLE SITES: ICD-10-CM

## 2018-10-17 DIAGNOSIS — Z79.52 LONG TERM (CURRENT) USE OF SYSTEMIC STEROIDS: ICD-10-CM

## 2018-10-17 PROCEDURE — 99214 OFFICE O/P EST MOD 30 MIN: CPT | Mod: PBBFAC | Performed by: INTERNAL MEDICINE

## 2018-10-17 PROCEDURE — 99999 PR PBB SHADOW E&M-EST. PATIENT-LVL IV: CPT | Mod: PBBFAC,,, | Performed by: INTERNAL MEDICINE

## 2018-10-17 PROCEDURE — 99214 OFFICE O/P EST MOD 30 MIN: CPT | Mod: S$PBB,,, | Performed by: INTERNAL MEDICINE

## 2018-10-17 RX ORDER — HYDROCODONE BITARTRATE AND ACETAMINOPHEN 5; 325 MG/1; MG/1
1 TABLET ORAL EVERY 12 HOURS PRN
Qty: 20 TABLET | Refills: 0 | Status: ON HOLD | OUTPATIENT
Start: 2018-10-17 | End: 2018-10-25

## 2018-10-17 RX ORDER — HYDROXYCHLOROQUINE SULFATE 200 MG/1
400 TABLET, FILM COATED ORAL DAILY
Qty: 60 TABLET | Refills: 2 | Status: SHIPPED | OUTPATIENT
Start: 2018-10-17 | End: 2019-02-11 | Stop reason: SDUPTHER

## 2018-10-17 RX ORDER — POTASSIUM CHLORIDE 750 MG/1
20 TABLET, EXTENDED RELEASE ORAL DAILY
Qty: 60 TABLET | Refills: 3 | Status: SHIPPED | OUTPATIENT
Start: 2018-10-17 | End: 2019-05-21 | Stop reason: SDUPTHER

## 2018-10-17 NOTE — PATIENT INSTRUCTIONS
· Continue prednisone 30 mg daily until further notice.  · Refills for Plaquenil and Norco provided.  · Increase Lasix to 40 mg in am + 20 mg later in the day.  If leg cramps occur, take potassium pills.  If you need to take the potassium, please let us know so we can check bloodwork for electrolytes.  · Will need follow up in Rheumatology with Dr. Lonnie Rouse.  · CXR to be done next week.  · Further treatment planning regarding medications after the CXR next week.

## 2018-10-18 NOTE — PROGRESS NOTES
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    Chief Complaint: Shortness of Breath    HPI     At her visit last week, I was worried that DrMolly Lux had recurrence/relapse of acute hypoxemic respiratory failure with bilateral CXR infiltrates in the setting of her steroid taper.  This seemed very similar to her illness in Spring 2018 that resulted in prolonged ICU/hospital stay without a definitive diagnosis proven.  Last week we decided to bump her prednisone dose up to 30 mg daily, in addition to increasing her diuretic and completing a course of levofloxacin.  She only took a day of the increased diuretic dose due to severe leg cramps.  As a result, she has been taking 40 mg daily of furosemide.    She noticed significant improvement after ~5 days of treatment as outlined above.  She and her friends noted that she is doing "better", although she still gets a little shortwinded during conversation.  She denies cough or overt dyspnea but still feels like she is not breathing at her full capacity.  She also notes that she is "tight-lip" breathing frequently.  She denies chest pain/tightness or pleurisy, as well as fever, chills, and night sweats.    She requests refills of her Plaquenil and Norco.    Review of Systems   Constitutional: Negative for fever, chills, weight loss, activity change, fatigue and night sweats.   HENT: Negative for trouble swallowing.    Respiratory: Negative for cough, hemoptysis, sputum production, chest tightness, shortness of breath, wheezing, previous hospitialization due to pulmonary problems, pleurisy, dyspnea on extertion and use of rescue inhaler.    Cardiovascular: Positive for leg swelling. Negative for chest pain and palpitations.   Musculoskeletal: Positive for arthralgias.   Gastrointestinal: Negative for acid reflux.       Objective:      Physical Exam   Constitutional: She is oriented to person, place, and time. No distress.   Cardiovascular: " Normal rate, regular rhythm and normal heart sounds. Exam reveals no gallop.   No murmur heard.  Pulmonary/Chest: Normal expansion, symmetric chest wall expansion, effort normal and breath sounds normal. No stridor. No respiratory distress. She has no decreased breath sounds. She has no wheezes. She has no rhonchi. She has no rales.   Musculoskeletal: She exhibits edema (2+ bilateral ankle swelling).   Neurological: She is alert and oriented to person, place, and time. Gait normal.   Skin: No cyanosis. Nails show no clubbing.   Psychiatric: She has a normal mood and affect. Judgment normal.   Nursing note and vitals reviewed.    Personal Diagnostic Review    Results for SARA HERNANDEZ MD (MRN [id number])    Ref. Range 10/8/2018 15:23   WBC Latest Ref Range: 3.90 - 12.70 K/uL 16.50 (H)   Hemoglobin Latest Ref Range: 12.0 - 16.0 g/dL 11.6 (L)   Hematocrit Latest Ref Range: 37.0 - 48.5 % 38.5   Platelets Latest Ref Range: 150 - 350 K/uL 328   MPV Latest Ref Range: 9.2 - 12.9 fL 10.1   Gran% Latest Ref Range: 38.0 - 73.0 % 79.7 (H)   Gran # (ANC) Latest Ref Range: 1.8 - 7.7 K/uL 13.2 (H)   Immature Granulocytes Latest Ref Range: 0.0 - 0.5 % 1.6 (H)   Immature Grans (Abs) Latest Ref Range: 0.00 - 0.04 K/uL 0.26 (H)   Lymph% Latest Ref Range: 18.0 - 48.0 % 6.1 (L)   Lymph # Latest Ref Range: 1.0 - 4.8 K/uL 1.0   Mono% Latest Ref Range: 4.0 - 15.0 % 12.1   Mono # Latest Ref Range: 0.3 - 1.0 K/uL 2.0 (H)   Eosinophil% Latest Ref Range: 0.0 - 8.0 % 0.4   Eos # Latest Ref Range: 0.0 - 0.5 K/uL 0.1   Basophil% Latest Ref Range: 0.0 - 1.9 % 0.1   Baso # Latest Ref Range: 0.00 - 0.20 K/uL 0.02   nRBC Latest Ref Range: 0 /100 WBC 0   Sed Rate Latest Ref Range: 0 - 36 mm/Hr 72 (H)   Sodium Latest Ref Range: 136 - 145 mmol/L 134 (L)   Potassium Latest Ref Range: 3.5 - 5.1 mmol/L 3.9   Chloride Latest Ref Range: 95 - 110 mmol/L 97   CO2 Latest Ref Range: 23 - 29 mmol/L 27   Anion Gap Latest Ref Range: 8 - 16 mmol/L 10   BUN,  Bld Latest Ref Range: 8 - 23 mg/dL 15   Creatinine Latest Ref Range: 0.5 - 1.4 mg/dL 1.1   eGFR if African American Latest Ref Range: >60 mL/min/1.73 m^2 54.4 (A)   Glucose Latest Ref Range: 70 - 110 mg/dL 330 (H)   Calcium Latest Ref Range: 8.7 - 10.5 mg/dL 9.2   Alkaline Phosphatase Latest Ref Range: 55 - 135 U/L 80   Total Protein Latest Ref Range: 6.0 - 8.4 g/dL 6.7   Albumin Latest Ref Range: 3.5 - 5.2 g/dL 2.5 (L)   Total Bilirubin Latest Ref Range: 0.1 - 1.0 mg/dL 0.5   AST Latest Ref Range: 10 - 40 U/L 17   ALT Latest Ref Range: 10 - 44 U/L 16   CRP Latest Ref Range: 0.0 - 8.2 mg/L 179.7 (H)   CCP Antibodies Latest Ref Range: <5.0 U/mL <0.5   Rheumatoid Factor Latest Ref Range: 0.0 - 15.0 IU/mL 14.0         No flowsheet data found.      Assessment:       1. Acute respiratory distress    2. Seropositive rheumatoid arthritis of multiple sites    3. Long term (current) use of systemic steroids        Outpatient Encounter Medications as of 10/17/2018   Medication Sig Dispense Refill    aspirin (ECOTRIN) 81 MG EC tablet Take 81 mg by mouth once daily.      baclofen (LIORESAL) 10 MG tablet Take 1 tablet (10 mg total) by mouth 3 (three) times daily. 270 tablet 3    carvedilol (COREG) 12.5 MG tablet Take 1 tablet (12.5 mg total) by mouth 2 (two) times daily with meals. 180 tablet 3    clopidogrel (PLAVIX) 75 mg tablet 1 tablet (75 mg total) by Per G Tube route once daily. (Patient taking differently: Take 75 mg by mouth once daily. ) 90 tablet 1    furosemide (LASIX) 40 MG tablet Take 1 tablet (40 mg total) by mouth 2 (two) times daily. (Patient taking differently: Take 40 mg by mouth once daily. ) 60 tablet 3    gabapentin (NEURONTIN) 300 MG capsule Take 1 capsule (300 mg total) by mouth 3 (three) times daily. 90 capsule 2    levoFLOXacin (LEVAQUIN) 500 MG tablet Take 1 tablet (500 mg total) by mouth once daily. 10 tablet 0    magnesium oxide (MAG-OX) 400 mg tablet Take 400 mg by mouth once daily.       montelukast (SINGULAIR) 10 mg tablet Take 1 tablet (10 mg total) by mouth every evening. 90 tablet 3    omeprazole (PRILOSEC) 20 MG capsule Take 20 mg by mouth once daily.       predniSONE (DELTASONE) 10 MG tablet Take 2 tablets (20 mg total) by mouth once daily. 60 tablet 3    predniSONE (DELTASONE) 5 MG tablet Take 1 tablet (5 mg total) by mouth once daily. Take 4 in the am and 3 in the evening for 2 weeks - then  Take 3 in the am and 3 in the evening for 2 weeks - then  Take 3 in the am and 2 in the evening for 2 weeks - consult with rheumatology for further advice (Patient taking differently: Take 10 mg by mouth once daily. Take 4 in the am and 3 in the evening for 2 weeks - then  Take 3 in the am and 3 in the evening for 2 weeks - then  Take 3 in the am and 2 in the evening for 2 weeks - consult with rheumatology for further advice) 90 tablet 1    thiamine 100 MG tablet Take 100 mg by mouth once daily.      furosemide (LASIX) 20 MG tablet Take 1 tablet (20 mg total) by mouth 2 (two) times daily. 60 tablet 0    HYDROcodone-acetaminophen (NORCO) 5-325 mg per tablet Take 1 tablet by mouth every 12 (twelve) hours as needed for Pain. 20 tablet 0    hydroxychloroquine (PLAQUENIL) 200 mg tablet Take 2 tablets (400 mg total) by mouth once daily. 60 tablet 2    pantoprazole (PROTONIX) 40 mg GrPS 1 packet (40 mg total) by Per G Tube route once daily. (Patient taking differently: Take 20 mg by mouth once daily. ) 30 packet 11    potassium chloride SA (K-DUR,KLOR-CON) 10 MEQ tablet Take 2 tablets (20 mEq total) by mouth once daily. 60 tablet 3    [DISCONTINUED] hydroxychloroquine (PLAQUENIL) 200 mg tablet Take 2 tablets (400 mg total) by mouth once daily. 60 tablet 2     No facility-administered encounter medications on file as of 10/17/2018.      Orders Placed This Encounter   Procedures    X-Ray Chest PA And Lateral     Standing Status:   Future     Standing Expiration Date:   10/17/2019     Order Specific  Question:   Reason for Exam:     Answer:   Bilateral infiltrates     Order Specific Question:   May the Radiologist modify the order per protocol to meet the clinical needs of the patient?     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  Her resting SpO2 on room air is 92% at this visit (90% last week).  It seems most likely that the increased steroid dose is making the difference.  I have asked her to continue with the 30 mg prednisone dose for now with the plan to recheck a CXR in the next week or so.  This can be done at the [facility] if she prefers to do the X-ray closer to her home.  If the CXR is not improving, we will need to consider further work up that would include chest CT (non-contrast) and possibly other serologic testing.  I mentioned the possibilitI have copied Dr. Lee (Primary Care) and Dr. Rouse (Rheumatology) on this note.I have copied Dr. Lee (Primary Care) and Dr. Rouse (Rheumatology) on this note.y of future bronchoscopy and she was very reluctant to consider that for now.  She also does not have scheduled follow up in Rheumatology, so I will copy Dr. Rouse on this note.    Problem List Items Addressed This Visit     Acute respiratory distress - Primary    Overview     Prior ICU stay in April 2018 for ARDS with bilateral infiltrates and respiratory failure.  Bronchoscopy non-diagnostic.  Gradually cleared/improved with steroid taper and CXR in July 2018 was clear.    Repeat CXR in October shows bilateral infiltrates worrisome for recurrence of April illness.           Current Assessment & Plan     · Continue increased prednisone dose (30 mg daily) started on 10/10/2018  · Repeat CXR next week         Long term (current) use of systemic steroids    Overview     · Has taken prednisone 5 mg/d since 2012 when dx'd with RA.  · Recently tapering down slowly after April hospitalization.         Seropositive rheumatoid arthritis of multiple sites     Overview     Dx 2012 with Dr No, then Elenakem  HCQ since 2012  Prednisone 5 mg/d since 2012  Humira one dose April 2018 followed by ICU admission for pnemonia and resp failure         Current Assessment & Plan     · Follow up with Dr. Lonnie Rouse  · Recent serologies negative but may need further work up depending upon pulmonary findings.         Relevant Medications    potassium chloride SA (K-DUR,KLOR-CON) 10 MEQ tablet    hydroxychloroquine (PLAQUENIL) 200 mg tablet    HYDROcodone-acetaminophen (NORCO) 5-325 mg per tablet    Other Relevant Orders    X-Ray Chest PA And Lateral        · Continue prednisone 30 mg daily until further notice.  · Refills for Plaquenil and Norco provided.  · Increase Lasix to 40 mg in am + 20 mg later in the day.  If leg cramps occur, take potassium pills.  If you need to take the potassium, please let us know so we can check bloodwork for electrolytes.  · Will need follow up in Rheumatology with Dr. Lonnie Rouse.  · CXR to be done next week.  · Further treatment planning regarding medications after the CXR next week.      I have copied Dr. Lee (Primary Care) and Dr. Rouse (Rheumatology) on this note.    Please note that this was a 30 minute visit with greater than 50% of time spent in counseling the patient, as well as reviewing events and studies since her prior clinic visit with me.     Baldomero Francis MD  Pulmonary/Critical Care Medicine

## 2018-10-20 NOTE — ASSESSMENT & PLAN NOTE
· Follow up with Dr. Lonnie Rouse  · Recent serologies negative but may need further work up depending upon pulmonary findings.

## 2018-10-23 ENCOUNTER — HOSPITAL ENCOUNTER (INPATIENT)
Facility: HOSPITAL | Age: 83
LOS: 3 days | Discharge: HOME | DRG: 189 | End: 2018-10-26
Attending: INTERNAL MEDICINE | Admitting: INTERNAL MEDICINE
Payer: MEDICARE

## 2018-10-23 ENCOUNTER — OFFICE VISIT (OUTPATIENT)
Dept: INFECTIOUS DISEASES | Facility: CLINIC | Age: 83
DRG: 189 | End: 2018-10-23
Attending: FAMILY MEDICINE
Payer: MEDICARE

## 2018-10-23 VITALS
SYSTOLIC BLOOD PRESSURE: 130 MMHG | DIASTOLIC BLOOD PRESSURE: 80 MMHG | WEIGHT: 167.13 LBS | HEART RATE: 85 BPM | BODY MASS INDEX: 30.76 KG/M2 | TEMPERATURE: 99 F | HEIGHT: 62 IN

## 2018-10-23 DIAGNOSIS — J96.20 ACUTE ON CHRONIC RESPIRATORY FAILURE, UNSPECIFIED WHETHER WITH HYPOXIA OR HYPERCAPNIA: Primary | ICD-10-CM

## 2018-10-23 DIAGNOSIS — R06.09 DYSPNEA ON EXERTION: ICD-10-CM

## 2018-10-23 DIAGNOSIS — J96.00 RESPIRATORY FAILURE, ACUTE: ICD-10-CM

## 2018-10-23 DIAGNOSIS — R91.8 LUNG INFILTRATE: ICD-10-CM

## 2018-10-23 DIAGNOSIS — Z92.25 PERSONAL HISTORY OF IMMUNOSUPRESSION THERAPY: Primary | ICD-10-CM

## 2018-10-23 DIAGNOSIS — R09.02 HYPOXIA: ICD-10-CM

## 2018-10-23 LAB
ALLENS TEST: ABNORMAL
DELSYS: ABNORMAL
HCO3 UR-SCNC: 34.6 MMOL/L (ref 24–28)
PCO2 BLDA: 46 MMHG (ref 35–45)
PH SMN: 7.49 [PH] (ref 7.35–7.45)
PO2 BLDA: 60 MMHG (ref 80–100)
POC BE: 11 MMOL/L
POC SATURATED O2: 92 % (ref 95–100)
POC TCO2: 36 MMOL/L (ref 23–27)
SAMPLE: ABNORMAL
SITE: ABNORMAL

## 2018-10-23 PROCEDURE — 80053 COMPREHEN METABOLIC PANEL: CPT

## 2018-10-23 PROCEDURE — 86682 HELMINTH ANTIBODY: CPT

## 2018-10-23 PROCEDURE — 84100 ASSAY OF PHOSPHORUS: CPT

## 2018-10-23 PROCEDURE — 99900035 HC TECH TIME PER 15 MIN (STAT)

## 2018-10-23 PROCEDURE — 94761 N-INVAS EAR/PLS OXIMETRY MLT: CPT

## 2018-10-23 PROCEDURE — 85025 COMPLETE CBC W/AUTO DIFF WBC: CPT

## 2018-10-23 PROCEDURE — 99214 OFFICE O/P EST MOD 30 MIN: CPT | Mod: PBBFAC | Performed by: INTERNAL MEDICINE

## 2018-10-23 PROCEDURE — 83880 ASSAY OF NATRIURETIC PEPTIDE: CPT

## 2018-10-23 PROCEDURE — 99999 PR PBB SHADOW E&M-EST. PATIENT-LVL IV: CPT | Mod: PBBFAC,,, | Performed by: INTERNAL MEDICINE

## 2018-10-23 PROCEDURE — 36600 WITHDRAWAL OF ARTERIAL BLOOD: CPT

## 2018-10-23 PROCEDURE — 99214 OFFICE O/P EST MOD 30 MIN: CPT | Mod: S$PBB,,, | Performed by: INTERNAL MEDICINE

## 2018-10-23 PROCEDURE — 11000001 HC ACUTE MED/SURG PRIVATE ROOM

## 2018-10-23 PROCEDURE — 86140 C-REACTIVE PROTEIN: CPT

## 2018-10-23 PROCEDURE — 86403 PARTICLE AGGLUT ANTBDY SCRN: CPT

## 2018-10-23 PROCEDURE — 83735 ASSAY OF MAGNESIUM: CPT

## 2018-10-23 PROCEDURE — 82803 BLOOD GASES ANY COMBINATION: CPT

## 2018-10-23 PROCEDURE — 87449 NOS EACH ORGANISM AG IA: CPT

## 2018-10-23 PROCEDURE — 85652 RBC SED RATE AUTOMATED: CPT

## 2018-10-23 RX ORDER — LANOLIN ALCOHOL/MO/W.PET/CERES
400 CREAM (GRAM) TOPICAL DAILY
Status: DISCONTINUED | OUTPATIENT
Start: 2018-10-24 | End: 2018-10-26 | Stop reason: HOSPADM

## 2018-10-23 RX ORDER — PANTOPRAZOLE SODIUM 20 MG/1
20 TABLET, DELAYED RELEASE ORAL DAILY
Status: DISCONTINUED | OUTPATIENT
Start: 2018-10-24 | End: 2018-10-26 | Stop reason: HOSPADM

## 2018-10-23 RX ORDER — ONDANSETRON 8 MG/1
8 TABLET, ORALLY DISINTEGRATING ORAL EVERY 8 HOURS PRN
Status: DISCONTINUED | OUTPATIENT
Start: 2018-10-23 | End: 2018-10-26 | Stop reason: HOSPADM

## 2018-10-23 RX ORDER — THIAMINE HCL 100 MG
100 TABLET ORAL DAILY
Status: DISCONTINUED | OUTPATIENT
Start: 2018-10-24 | End: 2018-10-26 | Stop reason: HOSPADM

## 2018-10-23 RX ORDER — PROCHLORPERAZINE EDISYLATE 5 MG/ML
5 INJECTION INTRAMUSCULAR; INTRAVENOUS EVERY 6 HOURS PRN
Status: DISCONTINUED | OUTPATIENT
Start: 2018-10-23 | End: 2018-10-26 | Stop reason: HOSPADM

## 2018-10-23 RX ORDER — HYDROXYCHLOROQUINE SULFATE 200 MG/1
400 TABLET, FILM COATED ORAL DAILY
Status: DISCONTINUED | OUTPATIENT
Start: 2018-10-24 | End: 2018-10-26 | Stop reason: HOSPADM

## 2018-10-23 RX ORDER — CLOPIDOGREL BISULFATE 75 MG/1
75 TABLET ORAL DAILY
Status: DISCONTINUED | OUTPATIENT
Start: 2018-10-24 | End: 2018-10-26 | Stop reason: HOSPADM

## 2018-10-23 RX ORDER — CARVEDILOL 12.5 MG/1
12.5 TABLET ORAL 2 TIMES DAILY WITH MEALS
Status: DISCONTINUED | OUTPATIENT
Start: 2018-10-24 | End: 2018-10-26 | Stop reason: HOSPADM

## 2018-10-23 RX ORDER — BACLOFEN 10 MG/1
10 TABLET ORAL 3 TIMES DAILY
Status: DISCONTINUED | OUTPATIENT
Start: 2018-10-23 | End: 2018-10-26 | Stop reason: HOSPADM

## 2018-10-23 RX ORDER — BACLOFEN 10 MG/1
10 TABLET ORAL 3 TIMES DAILY
Status: DISCONTINUED | OUTPATIENT
Start: 2018-10-24 | End: 2018-10-23

## 2018-10-23 RX ORDER — ASPIRIN 81 MG/1
81 TABLET ORAL DAILY
Status: DISCONTINUED | OUTPATIENT
Start: 2018-10-24 | End: 2018-10-26 | Stop reason: HOSPADM

## 2018-10-23 RX ORDER — SODIUM CHLORIDE 0.9 % (FLUSH) 0.9 %
3 SYRINGE (ML) INJECTION
Status: DISCONTINUED | OUTPATIENT
Start: 2018-10-23 | End: 2018-10-26 | Stop reason: HOSPADM

## 2018-10-23 RX ORDER — PREDNISONE 10 MG/1
30 TABLET ORAL DAILY
Status: DISCONTINUED | OUTPATIENT
Start: 2018-10-24 | End: 2018-10-25

## 2018-10-23 RX ORDER — ENOXAPARIN SODIUM 100 MG/ML
40 INJECTION SUBCUTANEOUS EVERY 24 HOURS
Status: DISCONTINUED | OUTPATIENT
Start: 2018-10-24 | End: 2018-10-26 | Stop reason: HOSPADM

## 2018-10-23 RX ORDER — GABAPENTIN 300 MG/1
300 CAPSULE ORAL 3 TIMES DAILY
Status: DISCONTINUED | OUTPATIENT
Start: 2018-10-24 | End: 2018-10-26 | Stop reason: HOSPADM

## 2018-10-23 NOTE — PROGRESS NOTES
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 female with RA (s/p dose of humiria on 3/22 and now plaquenil/prednisone) who presents today for as a referral for abnormal chest x-ray.     Patient had admit 4/5 - 5/2 for SOB, cough, fatigue. At that time noted to have bilateral infiltrates on CXR. Treated for pneumonia with rocephin/azithro and duresis, and O2. Negative PE on 4/9. Bronch 4/11 with no growth. After discharge, went to stay at [facility] for rehab. Still feels generally week since last discharge, but had started to walk on her own without a walker.     Reports that she has not been feeling well for about 2.5 weeks. Main complaint is "feeling easily winded with exertion". Has been seeing Dr Francis with pulmonary who had been trying to ween down steroid dose, and then increased to 30mg based on symptoms. Of note, she says she ran out of plaquenil for about 10 days. She was also treated with levaquin for a pneumonia about 2 weeks ago. Reports this may have helped, but she doesn't feel "fully better". She has noticed bilateral lower extremity edema. Daughter reports that she can't lay fully flat or gets uncomfortable. Says she was told to take lasix 40 bid, but couldn't tolerate this dose because it made her feel "dry". Denies fevers or skin lesions or breakdown. Reports a cough about 2 weeks ago, but denies current cough or sputum production. Denies taking anything for PCP ppx.     Frustrated that she doesn't have a diagnosis for her SOB.     Review of Symptoms:  Constitutional: Denies fevers. + weakness  Cardiovascular: Denies chest pain, palpitations  Respiratory: + SOB, denies cough, hemoptysis, or wheezing.  GI: Denies nausea/vomitting, hematochezia, melena, abd pain, or changes in appetite.  Musculoskeletal: Denies joint pain " or myalgias.  Skin/breast: Denies rashes, lumps, lesions, or discharge.  Neurologic: Denies headache, dizziness, vertigo, or paresthesias.    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    Hypertension     IBS (irritable bowel syndrome) 6/21/2017    IGT (impaired glucose tolerance) 1/12/2016    Iron deficiency anemia secondary to inadequate dietary iron intake 6/24/2013    Joint pain     Keratoconjunctivitis sicca of both eyes not specified as Sjogren's 7/29/2016    Leiomyoma of uterus, unspecified 9/16/2014    Long QT interval 6/29/2016    Due to medication (plaquenil)     Macular edema 1/12/2015    Multinodular goiter 1/12/2016    Neuropathy 8/23/2017    Plaquenil causing adverse effect in therapeutic use 10/7/2016    Pneumococcal vaccine refused 8/17/2016    Pneumonia due to Streptococcus pneumoniae 4/5/2018    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/2015    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    s/p Right Total knee replacement 5/15/2013    Scoliosis of thoracic spine 5/15/2013    Small vessel disease, cerebrovascular 12/28/2017    Stroke     Vascular dementia 12/6/2017       Past Surgical History:   Procedure Laterality Date    CATARACT EXTRACTION      COLONOSCOPY N/A 9/29/2015    Procedure: COLONOSCOPY;  Surgeon: FIDELINA Sanchez MD;  Location: [facility] (4TH FLR);  Service: Endoscopy;  Laterality: N/A;    COLONOSCOPY N/A 9/29/2015    Performed by FIDELINA Sanchez MD at [facility] (4TH FLR)    EGD  (ESOPHAGOGASTRODUODENOSCOPY) N/A 3/11/2014    Performed by Federico Escobar MD at [facility] ENDO (4TH FLR)    EGD (ESOPHAGOGASTRODUODENOSCOPY) N/A 11/5/2013    Performed by Federico Escobar MD at [facility] ENDO (4TH FLR)    ESOPHAGOGASTRODUODENOSCOPY (EGD) N/A 10/4/2017    Performed by Mg Morton MD at [facility] ENDO    EYE SURGERY      INJECTION-STEROID-EPIDURAL-TRANSFORAMINAL Right 9/20/2017    Performed by [facility]elin Valdez MD at [facility] PAIN MGT    JOINT REPLACEMENT      right knee    KNEE SURGERY Left 12/31/2013    TKR    left parotidectomy      mixed tumor    SALIVARY GLAND SURGERY      TONSILLECTOMY         Family History   Problem Relation Age of Onset    Hypertension Mother     Heart disease Mother     Prostate cancer Father         prostate cancer    Cancer Father     Breast cancer Maternal Grandmother     Lupus Neg Hx     Psoriasis Neg Hx     Melanoma Neg Hx     Colon cancer Neg Hx        Social History     Socioeconomic History    Marital status:      Spouse name: Not on file    Number of children: Not on file    Years of education: Not on file    Highest education level: Not on file   Social Needs    Financial resource strain: Not on file    Food insecurity - worry: Not on file    Food insecurity - inability: Not on file    Transportation needs - medical: Not on file    Transportation needs - non-medical: Not on file   Occupational History    Not on file   Tobacco Use    Smoking status: Never Smoker    Smokeless tobacco: Never Used   Substance and Sexual Activity    Alcohol use: No     Alcohol/week: 0.0 oz     Comment: very seldom     Drug use: No    Sexual activity: No     Comment: ,  age 52,    Other Topics Concern    Are you pregnant or think you may be? Not Asked    Breast-feeding Not Asked   Social History Narrative    Not on file       Review of patient's allergies indicates:  No Known Allergies      Objective:   /80 (BP Location: Left arm, Patient Position:  "Sitting)   Pulse 85   Temp 98.9 °F (37.2 °C) (Oral)   Ht 5' 2" (1.575 m)   Wt 75.8 kg (167 lb 1.7 oz)   LMP  (LMP Unknown)   BMI 30.56 kg/m²     Desaturates to 80% O2 with walking about 20 feet    General: Afebrile, alert. Moderate respiratory distress  HEENT: JULISSA. EOMI, no scleral icterus. No sinus tenderness. Dry mucous membranes  Pulmonary: decreased breath sounds at R base.   Cardiac: RRR   Abdominal: Non-tender, non-distended. No guarding or rebound tenderness  Extremities: Moves all extremities x 4. Edema in bilateral feet. 2+ pulses.  Skin: No jaundice, rashes, or visible lesions.   Neurological:  Alert and oriented x 4.      Labs:  Glucose   Date Value Ref Range Status   10/08/2018 330 (H) 70 - 110 mg/dL Final   05/02/2018 105 70 - 110 mg/dL Final   04/30/2018 83 70 - 110 mg/dL Final     Calcium   Date Value Ref Range Status   10/08/2018 9.2 8.7 - 10.5 mg/dL Final   05/02/2018 7.8 (L) 8.7 - 10.5 mg/dL Final   04/30/2018 7.5 (L) 8.7 - 10.5 mg/dL Final     Albumin   Date Value Ref Range Status   10/08/2018 2.5 (L) 3.5 - 5.2 g/dL Final   07/27/2018 3.5 3.5 - 5.2 g/dL Final   04/25/2018 1.9 (L) 3.5 - 5.2 g/dL Final     Total Protein   Date Value Ref Range Status   10/08/2018 6.7 6.0 - 8.4 g/dL Final   04/25/2018 5.0 (L) 6.0 - 8.4 g/dL Final   04/24/2018 5.6 (L) 6.0 - 8.4 g/dL Final     Sodium   Date Value Ref Range Status   10/08/2018 134 (L) 136 - 145 mmol/L Final   05/02/2018 135 (L) 136 - 145 mmol/L Final   04/30/2018 136 136 - 145 mmol/L Final     Potassium   Date Value Ref Range Status   10/08/2018 3.9 3.5 - 5.1 mmol/L Final   05/02/2018 4.0 3.5 - 5.1 mmol/L Final   04/30/2018 3.3 (L) 3.5 - 5.1 mmol/L Final     CO2   Date Value Ref Range Status   10/08/2018 27 23 - 29 mmol/L Final   05/02/2018 22 (L) 23 - 29 mmol/L Final   04/30/2018 23 23 - 29 mmol/L Final     Chloride   Date Value Ref Range Status   10/08/2018 97 95 - 110 mmol/L Final   05/02/2018 107 95 - 110 mmol/L Final   04/30/2018 107 95 - " 110 mmol/L Final     BUN, Bld   Date Value Ref Range Status   10/08/2018 15 8 - 23 mg/dL Final   05/02/2018 14 8 - 23 mg/dL Final   04/30/2018 14 8 - 23 mg/dL Final     Creatinine   Date Value Ref Range Status   10/08/2018 1.1 0.5 - 1.4 mg/dL Final   05/02/2018 0.6 0.5 - 1.4 mg/dL Final   04/30/2018 0.6 0.5 - 1.4 mg/dL Final     Alkaline Phosphatase   Date Value Ref Range Status   10/08/2018 80 55 - 135 U/L Final   04/25/2018 58 55 - 135 U/L Final   04/24/2018 59 55 - 135 U/L Final     ALT   Date Value Ref Range Status   10/08/2018 16 10 - 44 U/L Final   04/25/2018 20 10 - 44 U/L Final   04/24/2018 18 10 - 44 U/L Final     AST   Date Value Ref Range Status   10/08/2018 17 10 - 40 U/L Final   04/25/2018 17 10 - 40 U/L Final   04/24/2018 16 10 - 40 U/L Final     Total Bilirubin   Date Value Ref Range Status   10/08/2018 0.5 0.1 - 1.0 mg/dL Final     Comment:     For infants and newborns, interpretation of results should be based  on gestational age, weight and in agreement with clinical  observations.  Premature Infant recommended reference ranges:  Up to 24 hours.............<8.0 mg/dL  Up to 48 hours............<12.0 mg/dL  3-5 days..................<15.0 mg/dL  6-29 days.................<15.0 mg/dL     04/25/2018 0.6 0.1 - 1.0 mg/dL Final     Comment:     For infants and newborns, interpretation of results should be based  on gestational age, weight and in agreement with clinical  observations.  Premature Infant recommended reference ranges:  Up to 24 hours.............<8.0 mg/dL  Up to 48 hours............<12.0 mg/dL  3-5 days..................<15.0 mg/dL  6-29 days.................<15.0 mg/dL     04/24/2018 0.7 0.1 - 1.0 mg/dL Final     Comment:     For infants and newborns, interpretation of results should be based  on gestational age, weight and in agreement with clinical  observations.  Premature Infant recommended reference ranges:  Up to 24 hours.............<8.0 mg/dL  Up to 48 hours............<12.0  mg/dL  3-5 days..................<15.0 mg/dL  6-29 days.................<15.0 mg/dL       WBC   Date Value Ref Range Status   10/08/2018 16.50 (H) 3.90 - 12.70 K/uL Final   10/03/2018 15.83 (H) 3.90 - 12.70 K/uL Final   07/27/2018 16.91 (H) 3.90 - 12.70 K/uL Final     Hemoglobin   Date Value Ref Range Status   10/08/2018 11.6 (L) 12.0 - 16.0 g/dL Final   10/03/2018 12.4 12.0 - 16.0 g/dL Final   07/27/2018 13.5 12.0 - 16.0 g/dL Final     Hematocrit   Date Value Ref Range Status   10/08/2018 38.5 37.0 - 48.5 % Final   10/03/2018 39.4 37.0 - 48.5 % Final   07/27/2018 44.4 37.0 - 48.5 % Final     MCV   Date Value Ref Range Status   10/08/2018 90 82 - 98 fL Final   10/03/2018 90 82 - 98 fL Final   07/27/2018 90 82 - 98 fL Final     Platelets   Date Value Ref Range Status   10/08/2018 328 150 - 350 K/uL Final   10/03/2018 234 150 - 350 K/uL Final   07/27/2018 181 150 - 350 K/uL Final     Lab Results   Component Value Date    CHOL 166 05/10/2017    CHOL 157 10/24/2013     Lab Results   Component Value Date    HDL 76 (H) 05/10/2017    HDL 57 10/24/2013     Lab Results   Component Value Date    LDLCALC 77.8 05/10/2017    LDLCALC 90.2 10/24/2013     Lab Results   Component Value Date    TRIG 52 04/16/2018    TRIG 61 05/10/2017    TRIG 49 10/24/2013     Lab Results   Component Value Date    CHOLHDL 45.8 05/10/2017    CHOLHDL 36.3 10/24/2013     RPR   Date Value Ref Range Status   08/24/2017 Non-reactive Non-reactive Final     No results found for: QUANTIFERON    Medications:  Current Outpatient Medications on File Prior to Visit   Medication Sig Dispense Refill    aspirin (ECOTRIN) 81 MG EC tablet Take 81 mg by mouth once daily.      baclofen (LIORESAL) 10 MG tablet Take 1 tablet (10 mg total) by mouth 3 (three) times daily. 270 tablet 3    carvedilol (COREG) 12.5 MG tablet Take 1 tablet (12.5 mg total) by mouth 2 (two) times daily with meals. 180 tablet 3    clopidogrel (PLAVIX) 75 mg tablet 1 tablet (75 mg total) by Per  G Tube route once daily. (Patient taking differently: Take 75 mg by mouth once daily. ) 90 tablet 1    furosemide (LASIX) 20 MG tablet Take 1 tablet (20 mg total) by mouth 2 (two) times daily. 60 tablet 0    furosemide (LASIX) 40 MG tablet Take 1 tablet (40 mg total) by mouth 2 (two) times daily. (Patient taking differently: Take 40 mg by mouth once daily. ) 60 tablet 3    gabapentin (NEURONTIN) 300 MG capsule Take 1 capsule (300 mg total) by mouth 3 (three) times daily. 90 capsule 2    HYDROcodone-acetaminophen (NORCO) 5-325 mg per tablet Take 1 tablet by mouth every 12 (twelve) hours as needed for Pain. 20 tablet 0    hydroxychloroquine (PLAQUENIL) 200 mg tablet Take 2 tablets (400 mg total) by mouth once daily. 60 tablet 2    levoFLOXacin (LEVAQUIN) 500 MG tablet Take 1 tablet (500 mg total) by mouth once daily. 10 tablet 0    magnesium oxide (MAG-OX) 400 mg tablet Take 400 mg by mouth once daily.      montelukast (SINGULAIR) 10 mg tablet Take 1 tablet (10 mg total) by mouth every evening. 90 tablet 3    omeprazole (PRILOSEC) 20 MG capsule Take 20 mg by mouth once daily.       pantoprazole (PROTONIX) 40 mg GrPS 1 packet (40 mg total) by Per G Tube route once daily. (Patient taking differently: Take 20 mg by mouth once daily. ) 30 packet 11    potassium chloride SA (K-DUR,KLOR-CON) 10 MEQ tablet Take 2 tablets (20 mEq total) by mouth once daily. 60 tablet 3    predniSONE (DELTASONE) 10 MG tablet Take 2 tablets (20 mg total) by mouth once daily. 60 tablet 3    predniSONE (DELTASONE) 5 MG tablet Take 1 tablet (5 mg total) by mouth once daily. Take 4 in the am and 3 in the evening for 2 weeks - then  Take 3 in the am and 3 in the evening for 2 weeks - then  Take 3 in the am and 2 in the evening for 2 weeks - consult with rheumatology for further advice (Patient taking differently: Take 10 mg by mouth once daily. Take 4 in the am and 3 in the evening for 2 weeks - then  Take 3 in the am and 3 in the  evening for 2 weeks - then  Take 3 in the am and 2 in the evening for 2 weeks - consult with rheumatology for further advice) 90 tablet 1    thiamine 100 MG tablet Take 100 mg by mouth once daily.       No current facility-administered medications on file prior to visit.        Antibiotics:   Antibiotics (From admission, onward)    None          HIV: No components found for: HIV 1/2 AG/AB  Hepatitis C IgG: No components found for: HEPATITIS C  Syphilis:   RPR   Date Value Ref Range Status   08/24/2017 Non-reactive Non-reactive Final       Hepatitis A IgG: No components found for: HEPATITIS A IGG  Hepatitis Bc IgG: No components found for: HEPATITIS B CORE IGG  Hepatitis Bs IgG:  Quantiferon: No results found for: QUANTIFERON  VZV IgG: No components found for: VARICELLA IGG    No components found for: SEDIMENTATION RATE  No components found for: C-REACTIVE PROTEIN      Microbiology x 7d:   Microbiology Results (last 7 days)     ** No results found for the last 168 hours. **          Immunization History   Administered Date(s) Administered    Influenza - High Dose 03/07/2018    PPD Test 05/02/2018    Pneumococcal Conjugate - 13 Valent 03/07/2018    Tdap 03/07/2018         Assessment:     Hypoxic respiratory distress, O2 sat drops to 80% in clinic  CXR with bilateral consolidation (pneumonia vs pulmonary edema)  Lower extremity edema  Orthopnea  immunosuppression from Humira (last dose 3/22), plaquenil, and steroids  Leukocytosis       Plan:     Hypoxic respiratory distress-- unclear if infectious vs cardiac vs pulmonary etiology. Pt has received anti-TNF and is on plaquenil and steroids, increasing possibility of infectious etiology. Pt with slowly progressive symptoms, with no apparent improvement on levaquin outpatient, which increases liklihood of aypical or opportunistic pathogen.There are several opportunistic pathogens associated with steroid and TNF use.   -- Given hypoxia, SOB, and infiltrates, PCP  pneumonia is high on the differential. Patient has not been on pcp ppx. Would check induced sputum for PCP smear. B-d glucan can be helpful too.   -- would check serum crypto and urine histoplasma antigen for work up of endemic mycoses  -- patient doesn't appear to have been screened for strongyloides before starting steroids. Would check strongyloides igG. Not unreasonable to empirically treat with ivermectin if delay in lab is expected, given high mortality in missed hyperinfection syndrome  -- quantiferon gold negative 2/27/18, so reactivation TB unlikely. TNF-alpha inhibitors are associated with increased risk of NTM as well. Would check AFB sputum cultures to evaluate for NTM infection.      Pt with signs of heart failure on exam including BEJARANO, lower extremity edema, orthopnea, but she doesn't carry this diagnosis. TNF-alpha inhibitor use has been associated with development of heart failure. However, fluid on cxr doesn't appear to be improving with outpatient lasix. Defer to medicine work up for other pulmonary causes of hypoxic respiratory failure including PE.    Given severity of hypoxia and need for urgent diagnosis, have coordinated hospital admission. Please consult infectious disease to continue following this patient inpatient.     Natalie Ocasio MD, MPH  Infectious Disease

## 2018-10-23 NOTE — PLAN OF CARE
Please call extension [phone] (if nobody answers, this will flip to a beeper, so put in your call back number) upon patient arrival to floor for Hospital Medicine admit team assignment and for additional admit orders for the patient.  Do not page the attending, staff physician associated with the patient on arrival (may not be in-house at the time of arrival).  Rather, always call [phone] to reach the triage physician for orders and team assignment.        Acceptance Note     Patients name:  Selma Lux, MRN: [id number]     Transferring Physician or Mid-Level provider/Clinic giving report:   Dr. Ocasio with ID clinic    Accepting Physician for admission to hospital:   MERARY Anguiano MD     Date of acceptance:  10/23/18    Review of patient's allergies indicates:  No Known Allergies    Past medical history:    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    Hypertension     IBS (irritable bowel syndrome) 6/21/2017    IGT (impaired glucose tolerance) 1/12/2016    Iron deficiency anemia secondary to inadequate dietary iron intake 6/24/2013    Joint pain     Keratoconjunctivitis sicca of both eyes not specified as Sjogren's 7/29/2016    Leiomyoma of uterus, unspecified 9/16/2014    Long QT interval 6/29/2016    Due to medication (plaquenil)     Macular edema 1/12/2015    Multinodular goiter 1/12/2016    Neuropathy 8/23/2017    Plaquenil causing adverse effect in therapeutic use 10/7/2016    Pneumococcal vaccine refused 8/17/2016    Pneumonia  "due to Streptococcus pneumoniae 4/5/2018    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/2015    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  She was admitted to  on 4/5/2018 to 05/02/2018 (27 days) with bilateral pulmonary infiltrates, presumptively treated as PNA (please refer to their extensive discharge summary for details).  She eventually followed up with Pulmonary, Dr. Francis on 10/17/18, who noted, "I was worried that Dr. Lux had recurrence/relapse of acute hypoxemic respiratory failure with bilateral CXR infiltrates in the setting of her steroid taper.  This seemed very similar to her illness in Spring 2018 that resulted in prolonged ICU/hospital stay without a definitive diagnosis proven.  Last week we decided to bump her prednisone dose up to 30 mg daily, in addition to increasing her diuretic and completing a course of levofloxacin.  She only took a day of the increased diuretic dose due to severe leg cramps.  As a result, she has been taking 40 mg daily of furosemide.  She noticed significant improvement after ~5 days of treatment as outlined above.  She and her friends noted that she is doing "better", although she still gets a little shortwinded during conversation."    She had a follow up CXR done which revealed continued bilateral infiltrates, so she was referred to ID clinic.  She came for her appt with ID today and her O2 sats were found to be 80% with any exertion (not hypoxic sitting).  Dr. Ocasio feels " bacterial PNA is unlikely at this point, so does not necessarily recommend empiric therapy with antibiotics.  She does have some bilateral lower leg swelling, chonic.        VS: Temp:  [98.9 °F (37.2 °C)] 98.9 °F (37.2 °C)  Pulse:  [85] 85  BP: (130)/(80) 130/80    Labs:  No results found for this or any previous visit (from the past 24 hour(s)).   No results found for this or any previous visit (from the past 200 hour(s)).     Radiographs:   XR CHEST PA AND LATERAL    CLINICAL HISTORY:  Shortness of breath    FINDINGS:  Patchy areas of consolidation bilaterally.  Heart is normal there is aortic plaque.  There is DJD.    Pressure: Multifocal pneumonia.  Pulmonary edema also possible.      Impression       See above      Electronically signed by: Brandin Pinto MD  Date: 10/05/2018       To Do List upon arrival:    1) Consult ID  2) Consult Pulmonary  3) 2D Echo with CFD  4) Induced sputum for PCP, check LDH, Beta-D-Glucan  5) Procalcitonin  6) ABG on room air  7) Consider non-contrast CT chest (unless suspecting PTE, then CTA)

## 2018-10-23 NOTE — LETTER
October 23, 2018      Bhargav Lee MD  [address]  [place] [address]           [facility] - Infectious Diseases  [address]  [facility] [address]  Phone: [phone]  Fax: [phone]          Patient: Selma Rosado MD Jose J   MR Number: [id number]   YOB: 1935   Date of Visit: 10/23/2018       Dear Dr. Bhargav Lee:    Thank you for referring Selma Lux to me for evaluation. Attached you will find relevant portions of my assessment and plan of care.    If you have questions, please do not hesitate to call me. I look forward to following Selma Lux along with you.    Sincerely,    Natalie Ocasio MD    Enclosure  CC:  No Recipients    If you would like to receive this communication electronically, please contact [email or web address] or [phone] to request more information on [employer] access.    For providers and/or their staff who would like to refer a patient to [facility], please contact us through our one-stop-shop provider referral line, [facility], at [phone].    If you feel you have received this communication in error or would no longer like to receive these types of communications, please e-mail [email or web address]

## 2018-10-24 PROBLEM — E83.42 HYPOMAGNESEMIA: Status: ACTIVE | Noted: 2018-10-24

## 2018-10-24 PROBLEM — G25.5 HEMICHOREA: Status: RESOLVED | Noted: 2017-08-23 | Resolved: 2018-10-24

## 2018-10-24 PROBLEM — Z75.8 DISCHARGE PLANNING ISSUES: Status: ACTIVE | Noted: 2018-10-24

## 2018-10-24 LAB
ALBUMIN SERPL BCP-MCNC: 2.6 G/DL
ALBUMIN SERPL BCP-MCNC: 3 G/DL
ALP SERPL-CCNC: 59 U/L
ALP SERPL-CCNC: 62 U/L
ALT SERPL W/O P-5'-P-CCNC: 7 U/L
ALT SERPL W/O P-5'-P-CCNC: 8 U/L
ANION GAP SERPL CALC-SCNC: 11 MMOL/L
ANION GAP SERPL CALC-SCNC: 8 MMOL/L
ANISOCYTOSIS BLD QL SMEAR: SLIGHT
AST SERPL-CCNC: 7 U/L
AST SERPL-CCNC: 7 U/L
BASOPHILS # BLD AUTO: 0.01 K/UL
BASOPHILS # BLD AUTO: 0.01 K/UL
BASOPHILS NFR BLD: 0.1 %
BASOPHILS NFR BLD: 0.1 %
BILIRUB SERPL-MCNC: 0.5 MG/DL
BILIRUB SERPL-MCNC: 0.5 MG/DL
BNP SERPL-MCNC: 113 PG/ML
BUN SERPL-MCNC: 16 MG/DL
BUN SERPL-MCNC: 19 MG/DL
CALCIUM SERPL-MCNC: 8.7 MG/DL
CALCIUM SERPL-MCNC: 9.2 MG/DL
CHLORIDE SERPL-SCNC: 100 MMOL/L
CHLORIDE SERPL-SCNC: 98 MMOL/L
CO2 SERPL-SCNC: 29 MMOL/L
CO2 SERPL-SCNC: 33 MMOL/L
CREAT SERPL-MCNC: 0.8 MG/DL
CREAT SERPL-MCNC: 1.1 MG/DL
CRP SERPL-MCNC: 20.6 MG/L
DIFFERENTIAL METHOD: ABNORMAL
DIFFERENTIAL METHOD: ABNORMAL
EOSINOPHIL # BLD AUTO: 0 K/UL
EOSINOPHIL # BLD AUTO: 0.1 K/UL
EOSINOPHIL NFR BLD: 0.1 %
EOSINOPHIL NFR BLD: 0.8 %
ERYTHROCYTE [DISTWIDTH] IN BLOOD BY AUTOMATED COUNT: 14.1 %
ERYTHROCYTE [DISTWIDTH] IN BLOOD BY AUTOMATED COUNT: 14.2 %
ERYTHROCYTE [SEDIMENTATION RATE] IN BLOOD BY WESTERGREN METHOD: 16 MM/HR
EST. GFR  (AFRICAN AMERICAN): 54.4 ML/MIN/1.73 M^2
EST. GFR  (AFRICAN AMERICAN): >60 ML/MIN/1.73 M^2
EST. GFR  (NON AFRICAN AMERICAN): 47.2 ML/MIN/1.73 M^2
EST. GFR  (NON AFRICAN AMERICAN): >60 ML/MIN/1.73 M^2
ESTIMATED PA SYSTOLIC PRESSURE: 27.21
GLOBAL PERICARDIAL EFFUSION: ABNORMAL
GLUCOSE SERPL-MCNC: 236 MG/DL
GLUCOSE SERPL-MCNC: 280 MG/DL
HCT VFR BLD AUTO: 37.5 %
HCT VFR BLD AUTO: 39.2 %
HGB BLD-MCNC: 11.4 G/DL
HGB BLD-MCNC: 11.9 G/DL
HYPOCHROMIA BLD QL SMEAR: ABNORMAL
IMM GRANULOCYTES # BLD AUTO: 0.14 K/UL
IMM GRANULOCYTES # BLD AUTO: 0.18 K/UL
IMM GRANULOCYTES NFR BLD AUTO: 0.9 %
IMM GRANULOCYTES NFR BLD AUTO: 1 %
LYMPHOCYTES # BLD AUTO: 1.6 K/UL
LYMPHOCYTES # BLD AUTO: 2.9 K/UL
LYMPHOCYTES NFR BLD: 16.6 %
LYMPHOCYTES NFR BLD: 9.8 %
MAGNESIUM SERPL-MCNC: 2 MG/DL
MAGNESIUM SERPL-MCNC: 2.2 MG/DL
MCH RBC QN AUTO: 27.2 PG
MCH RBC QN AUTO: 27.3 PG
MCHC RBC AUTO-ENTMCNC: 30.4 G/DL
MCHC RBC AUTO-ENTMCNC: 30.4 G/DL
MCV RBC AUTO: 90 FL
MCV RBC AUTO: 90 FL
MITRAL VALVE MOBILITY: NORMAL
MITRAL VALVE REGURGITATION: ABNORMAL
MONOCYTES # BLD AUTO: 1.6 K/UL
MONOCYTES # BLD AUTO: 2.7 K/UL
MONOCYTES NFR BLD: 15.1 %
MONOCYTES NFR BLD: 9.8 %
NEUTROPHILS # BLD AUTO: 11.7 K/UL
NEUTROPHILS # BLD AUTO: 12.6 K/UL
NEUTROPHILS NFR BLD: 66.4 %
NEUTROPHILS NFR BLD: 79.3 %
NRBC BLD-RTO: 0 /100 WBC
NRBC BLD-RTO: 0 /100 WBC
OVALOCYTES BLD QL SMEAR: ABNORMAL
PHOSPHATE SERPL-MCNC: 2.6 MG/DL
PHOSPHATE SERPL-MCNC: 2.9 MG/DL
PLATELET # BLD AUTO: 205 K/UL
PLATELET # BLD AUTO: 224 K/UL
PMV BLD AUTO: 10.1 FL
PMV BLD AUTO: 10.2 FL
POIKILOCYTOSIS BLD QL SMEAR: SLIGHT
POLYCHROMASIA BLD QL SMEAR: ABNORMAL
POTASSIUM SERPL-SCNC: 3.5 MMOL/L
POTASSIUM SERPL-SCNC: 4.1 MMOL/L
PROT SERPL-MCNC: 5.6 G/DL
PROT SERPL-MCNC: 6.5 G/DL
RBC # BLD AUTO: 4.17 M/UL
RBC # BLD AUTO: 4.37 M/UL
RETIRED EF AND QEF - SEE NOTES: 60 (ref 55–65)
SODIUM SERPL-SCNC: 139 MMOL/L
SODIUM SERPL-SCNC: 140 MMOL/L
WBC # BLD AUTO: 15.88 K/UL
WBC # BLD AUTO: 17.54 K/UL

## 2018-10-24 PROCEDURE — 11000001 HC ACUTE MED/SURG PRIVATE ROOM

## 2018-10-24 PROCEDURE — 85025 COMPLETE CBC W/AUTO DIFF WBC: CPT

## 2018-10-24 PROCEDURE — 93306 TTE W/DOPPLER COMPLETE: CPT

## 2018-10-24 PROCEDURE — 63600175 PHARM REV CODE 636 W HCPCS: Performed by: HOSPITALIST

## 2018-10-24 PROCEDURE — 36415 COLL VENOUS BLD VENIPUNCTURE: CPT

## 2018-10-24 PROCEDURE — 99223 1ST HOSP IP/OBS HIGH 75: CPT | Mod: GC,,, | Performed by: INTERNAL MEDICINE

## 2018-10-24 PROCEDURE — 93306 TTE W/DOPPLER COMPLETE: CPT | Mod: 26,,, | Performed by: INTERNAL MEDICINE

## 2018-10-24 PROCEDURE — 25000003 PHARM REV CODE 250

## 2018-10-24 PROCEDURE — 99223 1ST HOSP IP/OBS HIGH 75: CPT | Mod: AI,GC,, | Performed by: HOSPITALIST

## 2018-10-24 PROCEDURE — 25000003 PHARM REV CODE 250: Performed by: HOSPITALIST

## 2018-10-24 PROCEDURE — 84100 ASSAY OF PHOSPHORUS: CPT

## 2018-10-24 PROCEDURE — 83735 ASSAY OF MAGNESIUM: CPT

## 2018-10-24 PROCEDURE — 63600175 PHARM REV CODE 636 W HCPCS

## 2018-10-24 PROCEDURE — 87632 RESP VIRUS 6-11 TARGETS: CPT

## 2018-10-24 PROCEDURE — 80053 COMPREHEN METABOLIC PANEL: CPT

## 2018-10-24 RX ORDER — VANCOMYCIN HCL IN 5 % DEXTROSE 1G/250ML
15 PLASTIC BAG, INJECTION (ML) INTRAVENOUS
Status: DISCONTINUED | OUTPATIENT
Start: 2018-10-24 | End: 2018-10-26

## 2018-10-24 RX ORDER — IVERMECTIN 3 MG/1
200 TABLET ORAL ONCE
Status: COMPLETED | OUTPATIENT
Start: 2018-10-24 | End: 2018-10-24

## 2018-10-24 RX ORDER — SULFAMETHOXAZOLE AND TRIMETHOPRIM 800; 160 MG/1; MG/1
1 TABLET ORAL DAILY
Status: DISCONTINUED | OUTPATIENT
Start: 2018-10-24 | End: 2018-10-24

## 2018-10-24 RX ORDER — IBUPROFEN 200 MG
16 TABLET ORAL
Status: DISCONTINUED | OUTPATIENT
Start: 2018-10-24 | End: 2018-10-26 | Stop reason: HOSPADM

## 2018-10-24 RX ORDER — FUROSEMIDE 10 MG/ML
40 INJECTION INTRAMUSCULAR; INTRAVENOUS 2 TIMES DAILY
Status: DISCONTINUED | OUTPATIENT
Start: 2018-10-24 | End: 2018-10-25

## 2018-10-24 RX ORDER — IBUPROFEN 200 MG
24 TABLET ORAL
Status: DISCONTINUED | OUTPATIENT
Start: 2018-10-24 | End: 2018-10-26 | Stop reason: HOSPADM

## 2018-10-24 RX ORDER — AZITHROMYCIN 250 MG/1
250 TABLET, FILM COATED ORAL DAILY
Status: DISCONTINUED | OUTPATIENT
Start: 2018-10-25 | End: 2018-10-26

## 2018-10-24 RX ORDER — GLUCAGON 1 MG
1 KIT INJECTION
Status: DISCONTINUED | OUTPATIENT
Start: 2018-10-24 | End: 2018-10-26 | Stop reason: HOSPADM

## 2018-10-24 RX ADMIN — MAGNESIUM OXIDE TAB 400 MG (241.3 MG ELEMENTAL MG) 400 MG: 400 (241.3 MG) TAB at 08:10

## 2018-10-24 RX ADMIN — FUROSEMIDE 40 MG: 10 INJECTION, SOLUTION INTRAMUSCULAR; INTRAVENOUS at 05:10

## 2018-10-24 RX ADMIN — BACLOFEN 10 MG: 10 TABLET ORAL at 05:10

## 2018-10-24 RX ADMIN — ASPIRIN 81 MG: 81 TABLET, COATED ORAL at 08:10

## 2018-10-24 RX ADMIN — BACLOFEN 10 MG: 10 TABLET ORAL at 12:10

## 2018-10-24 RX ADMIN — CARVEDILOL 12.5 MG: 12.5 TABLET, FILM COATED ORAL at 08:10

## 2018-10-24 RX ADMIN — BACLOFEN 10 MG: 10 TABLET ORAL at 08:10

## 2018-10-24 RX ADMIN — IVERMECTIN 15000 MCG: 3 TABLET ORAL at 08:10

## 2018-10-24 RX ADMIN — GABAPENTIN 300 MG: 300 CAPSULE ORAL at 05:10

## 2018-10-24 RX ADMIN — SULFAMETHOXAZOLE AND TRIMETHOPRIM 1 TABLET: 800; 160 TABLET ORAL at 08:10

## 2018-10-24 RX ADMIN — GABAPENTIN 300 MG: 300 CAPSULE ORAL at 08:10

## 2018-10-24 RX ADMIN — CARVEDILOL 12.5 MG: 12.5 TABLET, FILM COATED ORAL at 05:10

## 2018-10-24 RX ADMIN — FUROSEMIDE 40 MG: 10 INJECTION, SOLUTION INTRAMUSCULAR; INTRAVENOUS at 08:10

## 2018-10-24 RX ADMIN — PREDNISONE 30 MG: 10 TABLET ORAL at 08:10

## 2018-10-24 RX ADMIN — Medication 500 MG: at 10:10

## 2018-10-24 RX ADMIN — Medication 100 MG: at 08:10

## 2018-10-24 RX ADMIN — ENOXAPARIN SODIUM 40 MG: 100 INJECTION SUBCUTANEOUS at 07:10

## 2018-10-24 RX ADMIN — PIPERACILLIN AND TAZOBACTAM 4.5 G: 4; .5 INJECTION, POWDER, LYOPHILIZED, FOR SOLUTION INTRAVENOUS; PARENTERAL at 06:10

## 2018-10-24 RX ADMIN — PANTOPRAZOLE SODIUM 20 MG: 20 TABLET, DELAYED RELEASE ORAL at 08:10

## 2018-10-24 RX ADMIN — HYDROXYCHLOROQUINE SULFATE 400 MG: 200 TABLET, FILM COATED ORAL at 08:10

## 2018-10-24 RX ADMIN — BACLOFEN 10 MG: 10 TABLET ORAL at 10:10

## 2018-10-24 RX ADMIN — VANCOMYCIN HYDROCHLORIDE 1000 MG: 1 INJECTION, POWDER, LYOPHILIZED, FOR SOLUTION INTRAVENOUS at 10:10

## 2018-10-24 RX ADMIN — GABAPENTIN 300 MG: 300 CAPSULE ORAL at 10:10

## 2018-10-24 RX ADMIN — CLOPIDOGREL 75 MG: 75 TABLET, FILM COATED ORAL at 08:10

## 2018-10-24 NOTE — H&P
"[facility] Hospital Medicine  History & Physical    Patient Name: Selma Rosado Lux, MD  MRN: [id number]  Admission Date: 10/23/2018  Attending Physician: Cliff Prado, *   Primary Care Provider: Bhargav Hirsch MD    [facility] Medicine Team: [facility] HOSP MED 4 Triston Ortega MD     Patient information was obtained from patient, past medical records and ER records.     Subjective:     Principal Problem:Acute on chronic respiratory failure    Chief Complaint: No chief complaint on file.       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  She was admitted to  on 4/5/2018 to 05/02/2018 (27 days) with bilateral pulmonary infiltrates, presumptively treated as PNA (please refer to their extensive discharge summary for details).  She eventually followed up with Pulmonary, Dr. Francis on 10/17/18, who noted, "I was worried that Dr. Lux had recurrence/relapse of acute hypoxemic respiratory failure with bilateral CXR infiltrates in the setting of her steroid taper.  This seemed very similar to her illness in Spring 2018 that resulted in prolonged ICU/hospital stay without a definitive diagnosis proven.  Last week we decided to bump her prednisone dose up to 30 mg daily, in addition to increasing her diuretic and completing a course of levofloxacin.  She only took a day of the increased diuretic dose due to severe leg cramps.  As a result, she has been taking 40 mg daily of furosemide.  She noticed significant improvement after ~5 days of treatment as outlined above.  She and her friends noted that she is doing "better", although she still gets a little shortwinded during conversation."     She had a follow up CXR done which revealed continued bilateral infiltrates, so she was referred to ID clinic.  She came for her appt with ID today and her O2 sats were found to be 80% with any exertion (not hypoxic sitting).   " Amarjit feels bacterial PNA is unlikely at this point, so does not necessarily recommend empiric therapy with antibiotics.  She does have some bilateral lower leg swelling, chonic.          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    Hypertension     IBS (irritable bowel syndrome) 6/21/2017    IGT (impaired glucose tolerance) 1/12/2016    Iron deficiency anemia secondary to inadequate dietary iron intake 6/24/2013    Joint pain     Keratoconjunctivitis sicca of both eyes not specified as Sjogren's 7/29/2016    Leiomyoma of uterus, unspecified 9/16/2014    Long QT interval 6/29/2016    Due to medication (plaquenil)     Macular edema 1/12/2015    Multinodular goiter 1/12/2016    Neuropathy 8/23/2017    Plaquenil causing adverse effect in therapeutic use 10/7/2016    Pneumococcal vaccine refused 8/17/2016    Pneumonia due to Streptococcus pneumoniae 4/5/2018    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/2015    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    s/p Right Total knee replacement 5/15/2013    Scoliosis of thoracic spine 5/15/2013    Small vessel disease, cerebrovascular 12/28/2017    Stroke     Vascular dementia 12/6/2017       Past Surgical History:   Procedure Laterality Date    CATARACT EXTRACTION      COLONOSCOPY N/A 9/29/2015    Procedure: COLONOSCOPY;  Surgeon: FIDELINA Sanchez MD;  Location: [facility] ([address] [place] [address]);  Service: Endoscopy;  Laterality: N/A;    COLONOSCOPY N/A 9/29/2015    Performed by FIDELINA  Baldomero Sanchez MD at [facility] ENDO (4TH FLR)    EGD (ESOPHAGOGASTRODUODENOSCOPY) N/A 3/11/2014    Performed by Federico Escobar MD at [facility] ENDO (4TH FLR)    EGD (ESOPHAGOGASTRODUODENOSCOPY) N/A 11/5/2013    Performed by Federico Escobar MD at [facility] ENDO (4TH FLR)    ESOPHAGOGASTRODUODENOSCOPY (EGD) N/A 10/4/2017    Performed by Mg Morton MD at [facility] END[facility]    EYE SURGERY      INJECTION-STEROID-EPIDURAL-TRANSFORAMINAL Right 9/20/2017    Performed by Joselin Valdez MD at [facility] PAIN MGT    JOINT REPLACEMENT      right knee    KNEE SURGERY Left 12/31/2013    TKR    left parotidectomy      mixed tumor    SALIVARY GLAND SURGERY      TONSILLECTOMY         Review of patient's allergies indicates:  No Known Allergies    No current facility-administered medications on file prior to encounter.      Current Outpatient Medications on File Prior to Encounter   Medication Sig    aspirin (ECOTRIN) 81 MG EC tablet Take 81 mg by mouth once daily.    baclofen (LIORESAL) 10 MG tablet Take 1 tablet (10 mg total) by mouth 3 (three) times daily.    carvedilol (COREG) 12.5 MG tablet Take 1 tablet (12.5 mg total) by mouth 2 (two) times daily with meals.    clopidogrel (PLAVIX) 75 mg tablet 1 tablet (75 mg total) by Per G Tube route once daily. (Patient taking differently: Take 75 mg by mouth once daily. )    furosemide (LASIX) 40 MG tablet Take 1 tablet (40 mg total) by mouth 2 (two) times daily. (Patient taking differently: Take 40 mg by mouth once daily. )    gabapentin (NEURONTIN) 300 MG capsule Take 1 capsule (300 mg total) by mouth 3 (three) times daily.    HYDROcodone-acetaminophen (NORCO) 5-325 mg per tablet Take 1 tablet by mouth every 12 (twelve) hours as needed for Pain.    hydroxychloroquine (PLAQUENIL) 200 mg tablet Take 2 tablets (400 mg total) by mouth once daily.    magnesium oxide (MAG-OX) 400 mg tablet Take 400 mg by mouth once daily.    montelukast (SINGULAIR) 10 mg tablet Take 1 tablet (10 mg total) by mouth  every evening.    pantoprazole (PROTONIX) 40 mg GrPS 1 packet (40 mg total) by Per G Tube route once daily. (Patient taking differently: Take 20 mg by mouth once daily. )    potassium chloride SA (K-DUR,KLOR-CON) 10 MEQ tablet Take 2 tablets (20 mEq total) by mouth once daily.    predniSONE (DELTASONE) 10 MG tablet Take 2 tablets (20 mg total) by mouth once daily.    predniSONE (DELTASONE) 5 MG tablet Take 1 tablet (5 mg total) by mouth once daily. Take 4 in the am and 3 in the evening for 2 weeks - then  Take 3 in the am and 3 in the evening for 2 weeks - then  Take 3 in the am and 2 in the evening for 2 weeks - consult with rheumatology for further advice (Patient taking differently: Take 10 mg by mouth once daily. Take 4 in the am and 3 in the evening for 2 weeks - then  Take 3 in the am and 3 in the evening for 2 weeks - then  Take 3 in the am and 2 in the evening for 2 weeks - consult with rheumatology for further advice)    thiamine 100 MG tablet Take 100 mg by mouth once daily.     Family History     Problem Relation (Age of Onset)    Breast cancer Maternal Grandmother    Cancer Father    Heart disease Mother    Hypertension Mother    Prostate cancer Father        Tobacco Use    Smoking status: Never Smoker    Smokeless tobacco: Never Used   Substance and Sexual Activity    Alcohol use: No     Alcohol/week: 0.0 oz     Comment: very seldom     Drug use: No    Sexual activity: No     Comment: ,  age 52,      Review of Systems   Constitutional: Positive for activity change. Negative for fever.   HENT: Negative for congestion.    Respiratory: Positive for shortness of breath. Negative for cough.    Cardiovascular: Negative for chest pain.   Gastrointestinal: Negative for nausea.   Genitourinary: Negative for dysuria.   Musculoskeletal: Negative for back pain.   Skin: Negative for wound.   Neurological: Negative for headaches.   Psychiatric/Behavioral: Negative for dysphoric mood.      Objective:     Vital Signs (Most Recent):  Temp: 98.6 °F (37 °C) (10/23/18 2022)  Pulse: 87 (10/23/18 2336)  Resp: (!) 24 (10/23/18 2336)  BP: (!) 153/84 (10/23/18 2336)  SpO2: 97 % (10/23/18 2336) Vital Signs (24h Range):  Temp:  [98.6 °F (37 °C)-98.9 °F (37.2 °C)] 98.6 °F (37 °C)  Pulse:  [82-96] 87  Resp:  [17-24] 24  SpO2:  [95 %-98 %] 97 %  BP: (130-153)/(79-84) 153/84     Weight: 72.6 kg (160 lb)  Body mass index is 29.26 kg/m².    Physical Exam   Constitutional: She is oriented to person, place, and time. She appears well-developed and well-nourished. No distress.   HENT:   Head: Normocephalic and atraumatic.   Eyes: Conjunctivae are normal. No scleral icterus.   Neck: Normal range of motion.   Cardiovascular: Normal rate, regular rhythm, normal heart sounds and intact distal pulses.   Pulmonary/Chest: Effort normal and breath sounds normal.   Abdominal: Soft. She exhibits no distension. There is no tenderness.   Musculoskeletal: Normal range of motion. She exhibits edema (2+ bilateral LE).   Neurological: She is alert and oriented to person, place, and time.   Skin: Skin is warm and dry.   Psychiatric: She has a normal mood and affect. Her behavior is normal.           Significant Labs: All pertinent labs within the past 24 hours have been reviewed.    Significant Imaging: I have reviewed and interpreted all pertinent imaging results/findings within the past 24 hours.    Assessment/Plan:     * Acute on chronic respiratory failure    Etiology of patient's lung disease is unclear. Exibiting features of CHF exacerbation; however, no current diagnosis and rheumatologic or infectious cause could also be a factor given patient's immunosuppressed state.     - Rheum:   - ESR, CRP pending  - Cardiac:   - Repeat BNP, echo ordered   - Given peripheral edema on exam and failure to improve on home diuretic regimen will attempt furosemide 40 mg IV BID  - Pulm:   - Patient follows with Dr. Francis, attempted PFTs but  "was unable to successfully complete exam, per patient, "I couldn't understand the directions."   - Pulm consulted for possible bronch to obtain AFB and PCP smear and culture   - Continued prednisone 30 qD  - Infectious:   - Beta-d glucan, serum crypto, urine histo, strongyloides IgG pending   - Per ID clinic note recs, patient given ivermectin for strongyloides and started on Bactrim DS qD for possible PCP  - CT chest w/o contrast ordered     Discharge planning issues    - PT/OT eval and treat       Hypomagnesemia    - Continue home mag supplementation  - Monitor daily       Neck pain    - Continue gabapentin, baclofen       Vascular dementia    - Continue ASA and plavix       Hypertension, essential    - Continue home meds       Seropositive rheumatoid arthritis of multiple sites    - Continue plaquenil          VTE Risk Mitigation (From admission, onward)        Ordered     enoxaparin injection 40 mg  Daily      10/23/18 2258     IP VTE HIGH RISK PATIENT  Once      10/23/18 2258             Triston Ortega MD  Department of Hospital Medicine   [facility]  "

## 2018-10-24 NOTE — SUBJECTIVE & OBJECTIVE
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    Hypertension     IBS (irritable bowel syndrome) 6/21/2017    IGT (impaired glucose tolerance) 1/12/2016    Iron deficiency anemia secondary to inadequate dietary iron intake 6/24/2013    Joint pain     Keratoconjunctivitis sicca of both eyes not specified as Sjogren's 7/29/2016    Leiomyoma of uterus, unspecified 9/16/2014    Long QT interval 6/29/2016    Due to medication (plaquenil)     Macular edema 1/12/2015    Multinodular goiter 1/12/2016    Neuropathy 8/23/2017    Plaquenil causing adverse effect in therapeutic use 10/7/2016    Pneumococcal vaccine refused 8/17/2016    Pneumonia due to Streptococcus pneumoniae 4/5/2018    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/2015    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    s/p Right Total knee replacement 5/15/2013    Scoliosis of thoracic spine 5/15/2013    Small vessel disease, cerebrovascular 12/28/2017    Stroke     Vascular dementia 12/6/2017       Past Surgical History:   Procedure Laterality Date    CATARACT EXTRACTION      COLONOSCOPY N/A 9/29/2015    Procedure: COLONOSCOPY;  Surgeon: FIDELINA Sanchez MD;  Location: [facility] ([address] [place] [address]);  Service: Endoscopy;  Laterality: N/A;    COLONOSCOPY N/A 9/29/2015    Performed by FIDELINA Sanchez MD at [facility] (4TH FLR)    EGD (ESOPHAGOGASTRODUODENOSCOPY) N/A 3/11/2014    Performed by Federico Escobar MD at [facility] (4TH FLR)    EGD (ESOPHAGOGASTRODUODENOSCOPY) N/A  11/5/2013    Performed by [facility] at [facility] ENDO (4TH FLR)    ESOPHAGOGASTRODUODENOSCOPY (EGD) N/A 10/4/2017    Performed by Mg Morton MD at [facility] ENDO    EYE SURGERY      INJECTION-STEROID-EPIDURAL-TRANSFORAMINAL Right 9/20/2017    Performed by [facility]elin Valdez [facility] at [facility] PAIN MGT    JOINT REPLACEMENT      right knee    KNEE SURGERY Left 12/31/2013    TKR    left parotidectomy      mixed tumor    SALIVARY GLAND SURGERY      TONSILLECTOMY         Review of patient's allergies indicates:  No Known Allergies    Medications:  Medications Prior to Admission   Medication Sig    aspirin (ECOTRIN) 81 MG EC tablet Take 81 mg by mouth once daily.    baclofen (LIORESAL) 10 MG tablet Take 1 tablet (10 mg total) by mouth 3 (three) times daily.    carvedilol (COREG) 12.5 MG tablet Take 1 tablet (12.5 mg total) by mouth 2 (two) times daily with meals.    clopidogrel (PLAVIX) 75 mg tablet 1 tablet (75 mg total) by Per G Tube route once daily. (Patient taking differently: Take 75 mg by mouth once daily. )    furosemide (LASIX) 40 MG tablet Take 1 tablet (40 mg total) by mouth 2 (two) times daily. (Patient taking differently: Take 40 mg by mouth once daily. )    gabapentin (NEURONTIN) 300 MG capsule Take 1 capsule (300 mg total) by mouth 3 (three) times daily.    HYDROcodone-acetaminophen (NORCO) 5-325 mg per tablet Take 1 tablet by mouth every 12 (twelve) hours as needed for Pain.    hydroxychloroquine (PLAQUENIL) 200 mg tablet Take 2 tablets (400 mg total) by mouth once daily.    magnesium oxide (MAG-OX) 400 mg tablet Take 400 mg by mouth once daily.    montelukast (SINGULAIR) 10 mg tablet Take 1 tablet (10 mg total) by mouth every evening.    pantoprazole (PROTONIX) 40 mg GrPS 1 packet (40 mg total) by Per G Tube route once daily. (Patient taking differently: Take 20 mg by mouth once daily. )    potassium chloride SA (K-DUR,KLOR-CON) 10 MEQ tablet Take 2 tablets (20 mEq total) by mouth once daily.     predniSONE (DELTASONE) 10 MG tablet Take 2 tablets (20 mg total) by mouth once daily.    predniSONE (DELTASONE) 5 MG tablet Take 1 tablet (5 mg total) by mouth once daily. Take 4 in the am and 3 in the evening for 2 weeks - then  Take 3 in the am and 3 in the evening for 2 weeks - then  Take 3 in the am and 2 in the evening for 2 weeks - consult with rheumatology for further advice (Patient taking differently: Take 10 mg by mouth once daily. Take 4 in the am and 3 in the evening for 2 weeks - then  Take 3 in the am and 3 in the evening for 2 weeks - then  Take 3 in the am and 2 in the evening for 2 weeks - consult with rheumatology for further advice)    thiamine 100 MG tablet Take 100 mg by mouth once daily.    [DISCONTINUED] levoFLOXacin (LEVAQUIN) 500 MG tablet Take 1 tablet (500 mg total) by mouth once daily.     Antibiotics (From admission, onward)    None        Antifungals (From admission, onward)    None        Antivirals (From admission, onward)    None           Immunization History   Administered Date(s) Administered    Influenza - High Dose 03/07/2018    PPD Test 05/02/2018    Pneumococcal Conjugate - 13 Valent 03/07/2018    Tdap 03/07/2018       Family History     Problem Relation (Age of Onset)    Breast cancer Maternal Grandmother    Cancer Father    Heart disease Mother    Hypertension Mother    Prostate cancer Father        Social History     Socioeconomic History    Marital status:      Spouse name: None    Number of children: None    Years of education: None    Highest education level: None   Social Needs    Financial resource strain: None    Food insecurity - worry: None    Food insecurity - inability: None    Transportation needs - medical: None    Transportation needs - non-medical: None   Occupational History    None   Tobacco Use    Smoking status: Never Smoker    Smokeless tobacco: Never Used   Substance and Sexual Activity    Alcohol use: No     Alcohol/week:  0.0 oz     Comment: very seldom     Drug use: No    Sexual activity: No     Comment: ,  age 52,    Other Topics Concern    Are you pregnant or think you may be? Not Asked    Breast-feeding Not Asked   Social History Narrative    None     Review of Systems   Constitutional: Positive for activity change. Negative for fever.   HENT: Negative for congestion.    Respiratory: Positive for shortness of breath. Negative for cough.    Cardiovascular: Negative for chest pain.   Gastrointestinal: Negative for nausea.   Genitourinary: Negative for dysuria.   Musculoskeletal: Negative for back pain.   Skin: Negative for wound.   Neurological: Negative for headaches.   Psychiatric/Behavioral: Negative for dysphoric mood.     Objective:     Vital Signs (Most Recent):  Temp: 98.7 °F (37.1 °C) (10/24/18 1653)  Pulse: 86 (10/24/18 1653)  Resp: 16 (10/24/18 1653)  BP: (!) 167/80 (10/24/18 1653)  SpO2: 96 % (10/24/18 1653) Vital Signs (24h Range):  Temp:  [98.1 °F (36.7 °C)-98.7 °F (37.1 °C)] 98.7 °F (37.1 °C)  Pulse:  [75-96] 86  Resp:  [16-25] 16  SpO2:  [94 %-98 %] 96 %  BP: (132-174)/(60-91) 167/80     Weight: 72.6 kg (160 lb)  Body mass index is 29.26 kg/m².    Estimated Creatinine Clearance: 51.5 mL/min (based on SCr of 0.8 mg/dL).    Physical Exam   Constitutional: She is oriented to person, place, and time. She appears well-developed and well-nourished. No distress.   HENT:   Head: Normocephalic and atraumatic.   Eyes: Conjunctivae are normal. No scleral icterus.   Neck: Normal range of motion.   Cardiovascular: Normal rate, regular rhythm, normal heart sounds and intact distal pulses.   Pulmonary/Chest: Effort normal and breath sounds normal.   Abdominal: Soft. She exhibits no distension. There is no tenderness.   Musculoskeletal: Normal range of motion. She exhibits edema (2+ bilateral LE).   Chronic changes of arthropathy affecting hands but no obvious active synovitis.   Neurological: She is alert and  oriented to person, place, and time.   Skin: Skin is warm and dry.   Psychiatric: She has a normal mood and affect. Her behavior is normal.       Significant Labs:   CBC:   Recent Labs   Lab 10/23/18  2342 10/24/18  0634   WBC 15.88* 17.54*   HGB 11.9* 11.4*   HCT 39.2 37.5    205     CMP:   Recent Labs   Lab 10/23/18  2342 10/24/18  0634    140   K 4.1 3.5   CL 98 100   CO2 33* 29   * 236*   BUN 19 16   CREATININE 1.1 0.8   CALCIUM 9.2 8.7   PROT 6.5 5.6*   ALBUMIN 3.0* 2.6*   BILITOT 0.5 0.5   ALKPHOS 62 59   AST 7* 7*   ALT 8* 7*   ANIONGAP 8 11   EGFRNONAA 47.2* >60.0       Significant Imaging: I have reviewed all pertinent imaging results/findings within the past 24 hours.

## 2018-10-24 NOTE — SUBJECTIVE & OBJECTIVE
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    Hypertension     IBS (irritable bowel syndrome) 6/21/2017    IGT (impaired glucose tolerance) 1/12/2016    Iron deficiency anemia secondary to inadequate dietary iron intake 6/24/2013    Joint pain     Keratoconjunctivitis sicca of both eyes not specified as Sjogren's 7/29/2016    Leiomyoma of uterus, unspecified 9/16/2014    Long QT interval 6/29/2016    Due to medication (plaquenil)     Macular edema 1/12/2015    Multinodular goiter 1/12/2016    Neuropathy 8/23/2017    Plaquenil causing adverse effect in therapeutic use 10/7/2016    Pneumococcal vaccine refused 8/17/2016    Pneumonia due to Streptococcus pneumoniae 4/5/2018    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/2015    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    s/p Right Total knee replacement 5/15/2013    Scoliosis of thoracic spine 5/15/2013    Small vessel disease, cerebrovascular 12/28/2017    Stroke     Vascular dementia 12/6/2017       Past Surgical History:   Procedure Laterality Date    CATARACT EXTRACTION      COLONOSCOPY N/A 9/29/2015    Procedure: COLONOSCOPY;  Surgeon: FIDELINA Sanchez MD;  Location: [facility] ([address] [place] [address]);  Service: Endoscopy;  Laterality: N/A;    COLONOSCOPY N/A 9/29/2015    Performed by FIDELINA Sanchez MD at [facility] (4TH FLR)    EGD (ESOPHAGOGASTRODUODENOSCOPY) N/A 3/11/2014    Performed by Federico Escobar MD at [facility] (4TH FLR)    EGD (ESOPHAGOGASTRODUODENOSCOPY) N/A  11/5/2013    Performed by [facility] at [facility] ENDO (4TH FLR)    ESOPHAGOGASTRODUODENOSCOPY (EGD) N/A 10/4/2017    Performed by Mg Morton [facility] ENDO    EYE SURGERY      INJECTION-STEROID-EPIDURAL-TRANSFORAMINAL Right 9/20/2017    Performed by [facility]elin [facility]dez [facility] at [facility] PAIN MGT    JOINT REPLACEMENT      right knee    KNEE SURGERY Left 12/31/2013    TKR    left parotidectomy      mixed tumor    SALIVARY GLAND SURGERY      TONSILLECTOMY         Review of patient's allergies indicates:  No Known Allergies    Family History     Problem Relation (Age of Onset)    Breast cancer Maternal Grandmother    Cancer Father    Heart disease Mother    Hypertension Mother    Prostate cancer Father        Tobacco Use    Smoking status: Never Smoker    Smokeless tobacco: Never Used   Substance and Sexual Activity    Alcohol use: No     Alcohol/week: 0.0 oz     Comment: very seldom     Drug use: No    Sexual activity: No     Comment: ,  age 52,          Review of Systems   Constitutional: Positive for activity change. Negative for appetite change, chills, fatigue and fever.   HENT: Negative.    Eyes: Negative.    Respiratory: Positive for shortness of breath (on exertion). Negative for cough and chest tightness.    Cardiovascular: Positive for leg swelling (bilateral and markedly worse over the last 3 weeks.). Negative for chest pain and palpitations.   Gastrointestinal: Negative.    Endocrine: Negative.    Genitourinary: Negative.    Musculoskeletal: Negative.    Skin: Negative.    Allergic/Immunologic: Negative.    Neurological: Negative.    Hematological: Negative.    Psychiatric/Behavioral: Negative.      Objective:     Vital Signs (Most Recent):  Temp: 98.5 °F (36.9 °C) (10/24/18 0837)  Pulse: 75 (10/24/18 0837)  Resp: (!) 21 (10/24/18 0837)  BP: (!) 174/81(MD at bedside and aware) (10/24/18 0837)  SpO2: 96 % (10/24/18 0837) Vital Signs (24h Range):  Temp:  [98.3 °F (36.8 °C)-98.9 °F (37.2  °C)] 98.5 °F (36.9 °C)  Pulse:  [75-96] 75  Resp:  [17-25] 21  SpO2:  [94 %-98 %] 96 %  BP: (130-174)/(79-91) 174/81     Weight: 72.6 kg (160 lb)  Body mass index is 29.26 kg/m².    No intake or output data in the 24 hours ending 10/24/18 0918    Physical Exam    Vents:       Lines/Drains/Airways     Drain                 Gastrostomy/Enterostomy 04/30/18 1654 Gastrostomy tube w/ balloon LUQ feeding 176 days          Peripheral Intravenous Line                 Peripheral IV - Single Lumen 10/23/18 2215 Distal;Left;Posterior Forearm less than 1 day                Significant Labs:    CBC/Anemia Profile:  Recent Labs   Lab 10/23/18  2342 10/24/18  0634   WBC 15.88* 17.54*   HGB 11.9* 11.4*   HCT 39.2 37.5    205   MCV 90 90   RDW 14.1 14.2        Chemistries:  Recent Labs   Lab 10/23/18  2342 10/24/18  0634    140   K 4.1 3.5   CL 98 100   CO2 33* 29   BUN 19 16   CREATININE 1.1 0.8   CALCIUM 9.2 8.7   ALBUMIN 3.0* 2.6*   PROT 6.5 5.6*   BILITOT 0.5 0.5   ALKPHOS 62 59   ALT 8* 7*   AST 7* 7*   MG 2.2 2.0   PHOS 2.9 2.6*       ABGs:   Recent Labs   Lab 10/23/18  2306   PH 7.485*   PCO2 46.0*   HCO3 34.6*   POCSATURATED 92*   BE 11     CMP:   Recent Labs   Lab 10/23/18  2342 10/24/18  0634    140   K 4.1 3.5   CL 98 100   CO2 33* 29   * 236*   BUN 19 16   CREATININE 1.1 0.8   CALCIUM 9.2 8.7   PROT 6.5 5.6*   ALBUMIN 3.0* 2.6*   BILITOT 0.5 0.5   ALKPHOS 62 59   AST 7* 7*   ALT 8* 7*   ANIONGAP 8 11   EGFRNONAA 47.2* >60.0     Respiratory Culture: No results for input(s): GSRESP, RESPIRATORYC in the last 48 hours.    Significant Imaging:   I have reviewed all pertinent imaging results/findings within the past 24 hours.

## 2018-10-24 NOTE — ASSESSMENT & PLAN NOTE
"Etiology of patient's lung disease is unclear. Exibiting features of CHF exacerbation; however, no current diagnosis and rheumatologic or infectious cause could also be a factor given patient's immunosuppressed state.     - Rheum:   - ESR, CRP pending  - Cardiac:   - Repeat BNP, echo ordered   - Given peripheral edema on exam and failure to improve on home diuretic regimen will attempt furosemide 40 mg IV BID  - Pulm:   - Patient follows with Dr. Francis, attempted PFTs but was unable to successfully complete exam, per patient, "I couldn't understand the directions."   - Pulm consulted for possible bronch to obtain AFB and PCP smear and culture   - Continued prednisone 30 qD  - Infectious:   - Beta-d glucan, serum crypto, urine histo, strongyloides IgG pending   - Per ID clinic note recs, patient given ivermectin for strongyloides and started on Bactrim DS qD for possible PCP  - CT chest w/o contrast ordered  "

## 2018-10-24 NOTE — HOSPITAL COURSE
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 Pt has bilateral consolidations on chest CT. Echo shows mitral regurg. SOB is at baseline per pt. No fevers. No cough.

## 2018-10-24 NOTE — ASSESSMENT & PLAN NOTE
- unsure if infectious etiology vs possible CHF vs worsening rheumatologic, possible mixed picture.  - pt's SPO2 above 90 on room air while at rest. Recommend O2 PRN to keep SPO2 >90.  - recommend 6 minute walk test prior to discharge.  - CXR shows cardiomegaly and pleural edema  - f/u echocardiogram  - pt declining bronchoscopy at this time, also on plavix.  - pt will need to f/u in pulm clinic

## 2018-10-24 NOTE — CONSULTS
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 Her seroids were increased 30mg daily and iuresis, but couldn't take her high dose lasix b/o cramps. Ran out of plaquenil for last 2 days. 2 weeks ago was tretaed with levofloxacin for presumed PNA again but no improvement. Was seen in ID clinic 10/23 and was hypoxic on RA on exertion, sent to ED for admission. ID consulted for recommendations of antibiotics and treatment in this immunocompromised patient.    FULL CONSULT TO FOLLOW    Thang Medina MD  ID Fellow

## 2018-10-24 NOTE — PLAN OF CARE
Problem: Patient Care Overview  Goal: Plan of Care Review  Outcome: Ongoing (interventions implemented as appropriate)   10/24/18 0516   Coping/Psychosocial   Plan Of Care Reviewed With patient   Pt remains in stable condition. AAOx4. Able to voice needs, none at this time. Denied pain and SOB. 2L at the bedside if needed. 02 sats 95% RA. Ambulates with assistance and cane. No acute distress noted at this time. Will cont to monitor for any changes. Call light in reach bed in lowest position, safety, maintained.

## 2018-10-24 NOTE — ASSESSMENT & PLAN NOTE
84yo woman w/a history of prior CVA, pAF, RA (dx 2012; RF seropositive, erosive; on maintenance HCQ/prednisone 5mg and s/p humira 3/22/2018 c/b multifocal HCAP s/p empiric vanc/zosyn/azithro course with associated hypoxic RF requiring ICU admission/prolonged rehab stay in 4/5/2018 to 5/2/2018; undergoing prolonged steroid wean with recent escalation to prednisone 30mg daily + LVQ course on 10/10 for presumptive pulmonary manifestation of rheumatologic disease vs CAP), IBS, and Hasimotos thyroiditis (goiter) who was admitted on 10/23/2018 for further evaluation of 3wks of progressive malaise, BEJARANO, and hypoxia (O2 80% in ID clinic) with recurrent bilateral patchy peripheral consolidations with air bronchograms (similar to 4/2018 CT chest but had resolved in interim), concerning for multifocal pneumonia (HCAP with bacterial pathogens not excluded as steroids could be suppressing inflammatory response; IFI, PCP, and other OI's remain possible given timecourse) vs inflammatory pneumonitis (including pulmonary disease associated with RA, , hypersensitivity pneumonitis among others; of note, volume overload as a primary  of her symptoms seems less likely given lack of improvement to diuresis trial and heterogeneous pattern of infiltrates). She is stable on room air at [facility] currently despite these symptoms.    - patient has refused bronchoscopy for tissue biopsy/culture at this time to clarify diagnosis further but is amenable to trial of antimicrobial agents to assess for clinical improvement while awaiting pending noninvasive studies (since she improved on a trial of antibiotics and steroids on 4/2018 admission)  - would start empiric vanc, zosyn, and azithro to cover for HCAP pathogens  - have sent aspergillus antigen, fungitell, urine histo/blasto, cocci ab, crypto ag, and RVP   - will f/u pending sputum culture  - await pending TTE  - steroids per pulmonary team  - pulmonary following -- will readdress possible  bronchoscopy for tissue biopsy to clarify diagnosis if she fails to improve with this management strategy

## 2018-10-24 NOTE — PLAN OF CARE
Bhargav Hirsch MD     Payor: MEDICARE / Plan: MEDICARE PART A & B / Product Type: Government /      Extended Emergency Contact Information  Primary Emergency Contact: Vania Silveira  Address: [address] [place] [address] of [facility]  Home Phone: [phone]  Relation:         10/24/18 1614   Discharge Assessment   Assessment Type Discharge Planning Assessment   Confirmed/corrected address and phone number on facesheet? Yes   Assessment information obtained from? Patient   Expected Length of Stay (days) 3   Communicated expected length of stay with patient/caregiver yes   Prior to hospitilization cognitive status: Alert/Oriented   Prior to hospitalization functional status: Assistive Equipment   Current cognitive status: Alert/Oriented   Current Functional Status: Assistive Equipment;Needs Assistance   Lives With alone   Able to Return to Prior Arrangements yes   Is patient able to care for self after discharge? Unable to determine at this time (comments)   Patient's perception of discharge disposition home or selfcare   Readmission Within The Last 30 Days no previous admission in last 30 days   Patient currently being followed by outpatient case management? No   Patient currently receives any other outside agency services? No   Do you have any problems affording any of your prescribed medications? No   Is the patient taking medications as prescribed? yes   Does the patient have transportation home? Yes   Transportation Available family or friend will provide   Does the patient receive services at the [facility]mad[facility]? No   Discharge Plan A Home;Home Health   Discharge Plan B Skilled Nursing Facility   Patient/Family In Agreement With Plan yes

## 2018-10-24 NOTE — CONSULTS
[facility]  Infectious Disease  Consult Note    Patient Name: Selma Alonzo Lux MD  MRN: [id number]  Admission Date: 10/23/2018  Hospital Length of Stay: 1 days  Attending Physician: Cliff Prado, *  Primary Care Provider: Bhargav Hirsch MD     Isolation Status: No active isolations    Patient information was obtained from patient and past medical records.      Consults: hypoxic RF in RA pt on IS therapy    Assessment/Plan:     * Acute on chronic respiratory failure    82yo woman w/a history of prior CVA, pAF, RA (dx 2012; RF seropositive, erosive; on maintenance HCQ/prednisone 5mg and s/p humira 3/22/2018 c/b multifocal HCAP s/p empiric vanc/zosyn/azithro course with associated hypoxic RF requiring ICU admission/prolonged rehab stay in 4/5/2018 to 5/2/2018; undergoing prolonged steroid wean with recent escalation to prednisone 30mg daily + LVQ course on 10/10 for presumptive pulmonary manifestation of rheumatologic disease vs CAP), IBS, and Hasimotos thyroiditis (goiter) who was admitted on 10/23/2018 for further evaluation of 3wks of progressive malaise, BEJARANO, and hypoxia (O2 80% in ID clinic) with recurrent bilateral patchy peripheral consolidations with air bronchograms (similar to 4/2018 CT chest but had resolved in interim), concerning for multifocal pneumonia (HCAP with bacterial pathogens not excluded as steroids could be suppressing inflammatory response; IFI, PCP, and other OI's remain possible given timecourse) vs inflammatory pneumonitis (including pulmonary disease associated with RA, , hypersensitivity pneumonitis among others; of note, volume overload as a primary  of her symptoms seems less likely given lack of improvement to diuresis trial and heterogeneous pattern of infiltrates). She is stable on room air at [facility] currently despite these symptoms.    - patient has refused bronchoscopy for tissue biopsy/culture at this time to clarify diagnosis further but  is amenable to trial of antimicrobial agents to assess for clinical improvement while awaiting pending noninvasive studies (since she improved on a trial of antibiotics and steroids on 4/2018 admission)  - would start empiric vanc, zosyn, and azithro to cover for HCAP pathogens  - have sent aspergillus antigen, fungitell, urine histo/blasto, cocci ab, crypto ag, and RVP   - will f/u pending sputum culture  - await pending TTE  - steroids per pulmonary team  - pulmonary following -- will readdress possible bronchoscopy for tissue biopsy to clarify diagnosis if she fails to improve with this management strategy         Thank you for your consult. I will follow-up with patient. Please contact us if you have any additional questions.    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 history of prior CVA, pAF, RA (dx 2012; RF seropositive, erosive; on maintenance HCQ/prednisone 5mg and s/p humira 3/22/2018 c/b multifocal HCAP s/p empiric vanc/zosyn/azithro course with associated hypoxic RF requiring ICU admission/prolonged rehab stay in 4/5/2018 to 5/2/2018; undergoing prolonged steroid wean with recent escalation to prednisone 30mg daily on 10/10 for presumptive pulmonary manifestation of rheumatologic disease), IBS, and Hasimotos thyroiditis (goiter) who was admitted on 10/23/2018 for further evaluation of 3wks of progressive malaise, BEJARANO, and hypoxia (O2 80% in ID clinic) with recurrent bilateral patchy peripheral consolidations with air bronchograms (similar to 4/2018 CT chest but had resolved in interim), concerning for multifocal pneumonia vs inflammatory pneumonitis. Patient notes that she received a course of LVQ about 2wks ago at the same time her steroids were increased with some improvement (particularly in a dry cough previously present) but not resolution in her BEJARANO. She  also notes some concurrent increasing minimal BLE edema since increasing her steroid dose but has been noncompliant on increased dose of lasix as an outpatient. She denies F/C/S, weight loss/gain, orthopnea, PND, CP, cough, hemoptysis, N/V, diarrhea, dysuria, rash, sick contacts, or travel. She is strongly opposed to undergoing bronchoscopy at this time to clarify a diagnosis.    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    Hypertension     IBS (irritable bowel syndrome) 6/21/2017    IGT (impaired glucose tolerance) 1/12/2016    Iron deficiency anemia secondary to inadequate dietary iron intake 6/24/2013    Joint pain     Keratoconjunctivitis sicca of both eyes not specified as Sjogren's 7/29/2016    Leiomyoma of uterus, unspecified 9/16/2014    Long QT interval 6/29/2016    Due to medication (plaquenil)     Macular edema 1/12/2015    Multinodular goiter 1/12/2016    Neuropathy 8/23/2017    Plaquenil causing adverse effect in therapeutic use 10/7/2016    Pneumococcal vaccine refused 8/17/2016    Pneumonia due to Streptococcus pneumoniae 4/5/2018    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/2015    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    s/p Right Total knee replacement 5/15/2013    Scoliosis of thoracic spine 5/15/2013    Small vessel disease, cerebrovascular 12/28/2017    Stroke     Vascular dementia 12/6/2017       Past Surgical History:   Procedure Laterality Date    CATARACT EXTRACTION      COLONOSCOPY  N/A 9/29/2015    Procedure: COLONOSCOPY;  Surgeon: FIDELINA Sanchez MD;  Location: [facility] (4TH FLR);  Service: Endoscopy;  Laterality: N/A;    COLONOSCOPY N/A 9/29/2015    Performed by FIDELINA Sanchez MD at [facility] ENDO (4TH FLR)    EGD (ESOPHAGOGASTRODUODENOSCOPY) N/A 3/11/2014    Performed by Federico Escobar MD at [facility] (4TH FLR)    EGD (ESOPHAGOGASTRODUODENOSCOPY) N/A 11/5/2013    Performed by Federico Escobar MD at [facility] (4TH FLR)    ESOPHAGOGASTRODUODENOSCOPY (EGD) N/A 10/4/2017    Performed by Mg Morton MD at [facility] ENDO    EYE SURGERY      INJECTION-STEROID-EPIDURAL-TRANSFORAMINAL Right 9/20/2017    [facility] PAIN MGT    JOINT REPLACEMENT      right knee    KNEE SURGERY Left 12/31/2013    TKR    left parotidectomy      mixed tumor    SALIVARY GLAND SURGERY      TONSILLECTOMY         Review of patient's allergies indicates:  No Known Allergies    Medications:  Medications Prior to Admission   Medication Sig    aspirin (ECOTRIN) 81 MG EC tablet Take 81 mg by mouth once daily.    baclofen (LIORESAL) 10 MG tablet Take 1 tablet (10 mg total) by mouth 3 (three) times daily.    carvedilol (COREG) 12.5 MG tablet Take 1 tablet (12.5 mg total) by mouth 2 (two) times daily with meals.    clopidogrel (PLAVIX) 75 mg tablet 1 tablet (75 mg total) by Per G Tube route once daily. (Patient taking differently: Take 75 mg by mouth once daily. )    furosemide (LASIX) 40 MG tablet Take 1 tablet (40 mg total) by mouth 2 (two) times daily. (Patient taking differently: Take 40 mg by mouth once daily. )    gabapentin (NEURONTIN) 300 MG capsule Take 1 capsule (300 mg total) by mouth 3 (three) times daily.    HYDROcodone-acetaminophen (NORCO) 5-325 mg per tablet Take 1 tablet by mouth every 12 (twelve) hours as needed for Pain.    hydroxychloroquine (PLAQUENIL) 200 mg tablet Take 2 tablets (400 mg total) by mouth once daily.    magnesium oxide (MAG-OX) 400 mg tablet Take 400 mg by  mouth once daily.    montelukast (SINGULAIR) 10 mg tablet Take 1 tablet (10 mg total) by mouth every evening.    pantoprazole (PROTONIX) 40 mg GrPS 1 packet (40 mg total) by Per G Tube route once daily. (Patient taking differently: Take 20 mg by mouth once daily. )    potassium chloride SA (K-DUR,KLOR-CON) 10 MEQ tablet Take 2 tablets (20 mEq total) by mouth once daily.    predniSONE (DELTASONE) 10 MG tablet Take 2 tablets (20 mg total) by mouth once daily.    predniSONE (DELTASONE) 5 MG tablet Take 1 tablet (5 mg total) by mouth once daily. Take 4 in the am and 3 in the evening for 2 weeks - then  Take 3 in the am and 3 in the evening for 2 weeks - then  Take 3 in the am and 2 in the evening for 2 weeks - consult with rheumatology for further advice (Patient taking differently: Take 10 mg by mouth once daily. Take 4 in the am and 3 in the evening for 2 weeks - then  Take 3 in the am and 3 in the evening for 2 weeks - then  Take 3 in the am and 2 in the evening for 2 weeks - consult with rheumatology for further advice)    thiamine 100 MG tablet Take 100 mg by mouth once daily.    [DISCONTINUED] levoFLOXacin (LEVAQUIN) 500 MG tablet Take 1 tablet (500 mg total) by mouth once daily.     Antibiotics (From admission, onward)    None        Antifungals (From admission, onward)    None        Antivirals (From admission, onward)    None           Immunization History   Administered Date(s) Administered    Influenza - High Dose 03/07/2018    PPD Test 05/02/2018    Pneumococcal Conjugate - 13 Valent 03/07/2018    Tdap 03/07/2018       Family History     Problem Relation (Age of Onset)    Breast cancer Maternal Grandmother    Cancer Father    Heart disease Mother    Hypertension Mother    Prostate cancer Father        Social History     Socioeconomic History    Marital status:      Spouse name: None    Number of children: None    Years of education: None    Highest education level: None   Social Needs     Financial resource strain: None    Food insecurity - worry: None    Food insecurity - inability: None    Transportation needs - medical: None    Transportation needs - non-medical: None   Occupational History    None   Tobacco Use    Smoking status: Never Smoker    Smokeless tobacco: Never Used   Substance and Sexual Activity    Alcohol use: No     Alcohol/week: 0.0 oz     Comment: very seldom     Drug use: No    Sexual activity: No     Comment: ,  age 52,    Other Topics Concern    Are you pregnant or think you may be? Not Asked    Breast-feeding Not Asked   Social History Narrative    None     Review of Systems   Constitutional: Positive for activity change. Negative for fever.   HENT: Negative for congestion.    Respiratory: Positive for shortness of breath. Negative for cough.    Cardiovascular: Negative for chest pain.   Gastrointestinal: Negative for nausea.   Genitourinary: Negative for dysuria.   Musculoskeletal: Negative for back pain.   Skin: Negative for wound.   Neurological: Negative for headaches.   Psychiatric/Behavioral: Negative for dysphoric mood.     Objective:     Vital Signs (Most Recent):  Temp: 98.7 °F (37.1 °C) (10/24/18 1653)  Pulse: 86 (10/24/18 1653)  Resp: 16 (10/24/18 1653)  BP: (!) 167/80 (10/24/18 1653)  SpO2: 96 % (10/24/18 1653) Vital Signs (24h Range):  Temp:  [98.1 °F (36.7 °C)-98.7 °F (37.1 °C)] 98.7 °F (37.1 °C)  Pulse:  [75-96] 86  Resp:  [16-25] 16  SpO2:  [94 %-98 %] 96 %  BP: (132-174)/(60-91) 167/80     Weight: 72.6 kg (160 lb)  Body mass index is 29.26 kg/m².    Estimated Creatinine Clearance: 51.5 mL/min (based on SCr of 0.8 mg/dL).    Physical Exam   Constitutional: She is oriented to person, place, and time. She appears well-developed and well-nourished. No distress.   HENT:   Head: Normocephalic and atraumatic.   Eyes: Conjunctivae are normal. No scleral icterus.   Neck: Normal range of motion.   Cardiovascular: Normal rate, regular rhythm,  normal heart sounds and intact distal pulses.   Pulmonary/Chest: Effort normal and breath sounds normal.   Abdominal: Soft. She exhibits no distension. There is no tenderness.   Musculoskeletal: Normal range of motion. She exhibits edema (2+ bilateral LE).   Chronic changes of arthropathy affecting hands but no obvious active synovitis.   Neurological: She is alert and oriented to person, place, and time.   Skin: Skin is warm and dry.   Psychiatric: She has a normal mood and affect. Her behavior is normal.       Significant Labs:   CBC:   Recent Labs   Lab 10/23/18  2342 10/24/18  0634   WBC 15.88* 17.54*   HGB 11.9* 11.4*   HCT 39.2 37.5    205     CMP:   Recent Labs   Lab 10/23/18  2342 10/24/18  0634    140   K 4.1 3.5   CL 98 100   CO2 33* 29   * 236*   BUN 19 16   CREATININE 1.1 0.8   CALCIUM 9.2 8.7   PROT 6.5 5.6*   ALBUMIN 3.0* 2.6*   BILITOT 0.5 0.5   ALKPHOS 62 59   AST 7* 7*   ALT 8* 7*   ANIONGAP 8 11   EGFRNONAA 47.2* >60.0       Significant Imaging: I have reviewed all pertinent imaging results/findings within the past 24 hours.

## 2018-10-24 NOTE — CONSULTS
"[facility]  Pulmonology  Consult Note    Patient Name: Selma Alonzo Lux MD  MRN: [id number]  Admission Date: 10/23/2018  Hospital Length of Stay: 1 days  Code Status: Full Code  Attending Physician: Cliff Prado, *  Primary Care Provider: Bhargav Hirsch MD   Principal Problem: Acute on chronic respiratory failure    Inpatient consult to Pulmonology  Consult performed by: Amber Fournier MD  Consult ordered by: Triston Ortega MD  Reason for consult: hypoxemia  Assessment/Recommendations:  unsure if infectious etiology vs  CHF vs worsening rheumatologic, possible mixed picture.  - pt's SPO2 above 90 on room air while at rest. Recommend O2 PRN to keep SPO2 >90.  - recommend 6 minute walk test prior to discharge.  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  She was admitted to  on 4/5/2018 to 05/02/2018 (27 days) with bilateral pulmonary infiltrates, presumptively treated as PNA (please refer to their extensive discharge summary for details).  She eventually followed up with Pulmonary, Dr. Francis on 10/17/18, who noted, "I was worried that DrMolly Lux had recurrence/relapse of acute hypoxemic respiratory failure with bilateral CXR infiltrates in the setting of her steroid taper.  This seemed very similar to her illness in Spring 2018 that resulted in prolonged ICU/hospital stay without a definitive diagnosis proven.  Last week we decided to bump her prednisone dose up to 30 mg daily, in addition to increasing her diuretic to 40mg lasix in the am and 40mg in the pm and completing a course of levofloxacin.  She only took a day of the increased diuretic dose due to severe leg cramps.  As a result, she has been taking 40 mg am and 20 pm of furosemide.  She noticed significant improvement " "after ~5 days of treatment as outlined above.  She and her friends noted that she is doing "better", although she still gets a little shortwinded during conversation."     She had a follow up CXR done which revealed continued bilateral infiltrates, so she was referred to ID clinic.  She came for her appt with ID today and her O2 sats were found to be 80% with any exertion (not hypoxic sitting).  Dr. Ocasio feels bacterial PNA is unlikely at this point, so does not necessarily recommend empiric therapy with antibiotics.  She does have some bilateral lower leg swelling which she says is markedly worse over the last 3 weeks.        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    Hypertension     IBS (irritable bowel syndrome) 6/21/2017    IGT (impaired glucose tolerance) 1/12/2016    Iron deficiency anemia secondary to inadequate dietary iron intake 6/24/2013    Joint pain     Keratoconjunctivitis sicca of both eyes not specified as Sjogren's 7/29/2016    Leiomyoma of uterus, unspecified 9/16/2014    Long QT interval 6/29/2016    Due to medication (plaquenil)     Macular edema 1/12/2015    Multinodular goiter 1/12/2016    Neuropathy 8/23/2017    Plaquenil causing adverse effect in therapeutic use 10/7/2016    Pneumococcal vaccine refused 8/17/2016    Pneumonia due to Streptococcus pneumoniae 4/5/2018    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/2015    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 ENDO (4TH FLR)    EGD (ESOPHAGOGASTRODUODENOSCOPY) N/A 3/11/2014    Performed by Federico Escobar MD at [facility] (4TH FLR)    EGD (ESOPHAGOGASTRODUODENOSCOPY) N/A 11/5/2013    Performed by [facility]obar [facility] at [facility] (4TH FLR)    ESOPHAGOGASTRODUODENOSCOPY (EGD) N/A 10/4/2017    Performed by Mg Morton MD at [facility] ENDO    EYE SURGERY      INJECTION-STEROID-EPIDURAL-TRANSFORAMINAL Right 9/20/2017    [facility] PAIN MGT    JOINT REPLACEMENT      right knee    KNEE SURGERY Left 12/31/2013    TKR    left parotidectomy      mixed tumor    SALIVARY GLAND SURGERY      TONSILLECTOMY         Review of patient's allergies indicates:  No Known Allergies    Family History     Problem Relation (Age of Onset)    Breast cancer Maternal Grandmother    Cancer Father    Heart disease Mother    Hypertension Mother    Prostate cancer Father        Tobacco Use    Smoking status: Never Smoker    Smokeless tobacco: Never Used   Substance and Sexual Activity    Alcohol use: No     Alcohol/week: 0.0 oz     Comment: very seldom     Drug use: No    Sexual activity: No     Comment: ,  age 52,          Review of Systems   Constitutional: Positive for activity change. Negative for appetite change, chills, fatigue and fever.   HENT: Negative.    Eyes: Negative.    Respiratory: Positive for shortness of breath (on exertion). Negative for cough and chest tightness.    Cardiovascular: Positive for leg swelling (bilateral and  markedly worse over the last 3 weeks.). Negative for chest pain and palpitations.   Gastrointestinal: Negative.    Endocrine: Negative.    Genitourinary: Negative.    Musculoskeletal: Negative.    Skin: Negative.    Allergic/Immunologic: Negative.    Neurological: Negative.    Hematological: Negative.    Psychiatric/Behavioral: Negative.      Objective:     Vital Signs (Most Recent):  Temp: 98.5 °F (36.9 °C) (10/24/18 0837)  Pulse: 75 (10/24/18 0837)  Resp: (!) 21 (10/24/18 0837)  BP: (!) 174/81(MD at bedside and aware) (10/24/18 0837)  SpO2: 96 % (10/24/18 0837) Vital Signs (24h Range):  Temp:  [98.3 °F (36.8 °C)-98.9 °F (37.2 °C)] 98.5 °F (36.9 °C)  Pulse:  [75-96] 75  Resp:  [17-25] 21  SpO2:  [94 %-98 %] 96 %  BP: (130-174)/(79-91) 174/81     Weight: 72.6 kg (160 lb)  Body mass index is 29.26 kg/m².    No intake or output data in the 24 hours ending 10/24/18 0918    Physical Exam    Vents:       Lines/Drains/Airways     Drain                 Gastrostomy/Enterostomy 04/30/18 1654 Gastrostomy tube w/ balloon LUQ feeding 176 days          Peripheral Intravenous Line                 Peripheral IV - Single Lumen 10/23/18 2215 Distal;Left;Posterior Forearm less than 1 day                Significant Labs:    CBC/Anemia Profile:  Recent Labs   Lab 10/23/18  2342 10/24/18  0634   WBC 15.88* 17.54*   HGB 11.9* 11.4*   HCT 39.2 37.5    205   MCV 90 90   RDW 14.1 14.2        Chemistries:  Recent Labs   Lab 10/23/18  2342 10/24/18  0634    140   K 4.1 3.5   CL 98 100   CO2 33* 29   BUN 19 16   CREATININE 1.1 0.8   CALCIUM 9.2 8.7   ALBUMIN 3.0* 2.6*   PROT 6.5 5.6*   BILITOT 0.5 0.5   ALKPHOS 62 59   ALT 8* 7*   AST 7* 7*   MG 2.2 2.0   PHOS 2.9 2.6*       ABGs:   Recent Labs   Lab 10/23/18  2306   PH 7.485*   PCO2 46.0*   HCO3 34.6*   POCSATURATED 92*   BE 11     CMP:   Recent Labs   Lab 10/23/18  2342 10/24/18  0634    140   K 4.1 3.5   CL 98 100   CO2 33* 29   * 236*   BUN 19 16   CREATININE 1.1  0.8   CALCIUM 9.2 8.7   PROT 6.5 5.6*   ALBUMIN 3.0* 2.6*   BILITOT 0.5 0.5   ALKPHOS 62 59   AST 7* 7*   ALT 8* 7*   ANIONGAP 8 11   EGFRNONAA 47.2* >60.0     Respiratory Culture: No results for input(s): GSRESP, RESPIRATORYC in the last 48 hours.    Significant Imaging:   I have reviewed all pertinent imaging results/findings within the past 24 hours.    Assessment/Plan:     * Acute on chronic respiratory failure    - unsure if infectious etiology vs possible CHF vs worsening rheumatologic, possible mixed picture.  - pt's SPO2 above 90 on room air while at rest. Recommend O2 PRN to keep SPO2 >90.  - recommend 6 minute walk test prior to discharge.  - CXR shows cardiomegaly and pleural edema  - f/u echocardiogram  - pt declining bronchoscopy at this time, also on plavix.  - pt will need to f/u in pulm clinic            Thank you for your consult. I will follow-up with patient. Please contact us if you have any additional questions.     Amber Fournier MD  Pulmonology  [facility]

## 2018-10-24 NOTE — PLAN OF CARE
Problem: Patient Care Overview  Goal: Plan of Care Review  Outcome: Ongoing (interventions implemented as appropriate)  Pt kept updated on plan of care throughout the day. Down to CT for a CT chest this AM.   She is alert and oriented. GUEVARA.   On room air. SpO2>95%. Lungs clear throughout. Denies dyspnea. No c/o pain  BP elevated this AM but scheduled meds given with good effect.   Fall precautions in place. Pt ambulates with SBA and use of personal cane.  Calls appropriately.

## 2018-10-24 NOTE — HPI
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  She was admitted to  on 4/5/2018 to 05/02/2018 (27 days) with bilateral pulmonary infiltrates, presumptively treated as PNA (please refer to their extensive discharge summary for details).  She eventually followed up with Pulmonary, Dr. Francis on 10/17/18, who noted, "I was worried that DrMolly Lux had recurrence/relapse of acute hypoxemic respiratory failure with bilateral CXR infiltrates in the setting of her steroid taper.  This seemed very similar to her illness in Spring 2018 that resulted in prolonged ICU/hospital stay without a definitive diagnosis proven.  Last week we decided to bump her prednisone dose up to 30 mg daily, in addition to increasing her diuretic to 40mg lasix in the am and 40mg in the pm and completing a course of levofloxacin.  She only took a day of the increased diuretic dose due to severe leg cramps.  As a result, she has been taking 40 mg am and 20 pm of furosemide.  She noticed significant improvement after ~5 days of treatment as outlined above.  She and her friends noted that she is doing "better", although she still gets a little shortwinded during conversation."     She had a follow up CXR done which revealed continued bilateral infiltrates, so she was referred to ID clinic.  She came for her appt with ID today and her O2 sats were found to be 80% with any exertion (not hypoxic sitting).  Dr. Ocasio feels bacterial PNA is unlikely at this point, so does not necessarily recommend empiric therapy with antibiotics.  She does have some bilateral lower leg swelling which she says is markedly worse over the last 3 weeks.      "

## 2018-10-24 NOTE — SUBJECTIVE & OBJECTIVE
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    Hypertension     IBS (irritable bowel syndrome) 6/21/2017    IGT (impaired glucose tolerance) 1/12/2016    Iron deficiency anemia secondary to inadequate dietary iron intake 6/24/2013    Joint pain     Keratoconjunctivitis sicca of both eyes not specified as Sjogren's 7/29/2016    Leiomyoma of uterus, unspecified 9/16/2014    Long QT interval 6/29/2016    Due to medication (plaquenil)     Macular edema 1/12/2015    Multinodular goiter 1/12/2016    Neuropathy 8/23/2017    Plaquenil causing adverse effect in therapeutic use 10/7/2016    Pneumococcal vaccine refused 8/17/2016    Pneumonia due to Streptococcus pneumoniae 4/5/2018    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/2015    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    s/p Right Total knee replacement 5/15/2013    Scoliosis of thoracic spine 5/15/2013    Small vessel disease, cerebrovascular 12/28/2017    Stroke     Vascular dementia 12/6/2017       Past Surgical History:   Procedure Laterality Date    CATARACT EXTRACTION      COLONOSCOPY N/A 9/29/2015    Procedure: COLONOSCOPY;  Surgeon: FIDELINA Sanchez MD;  Location: [facility] ([address] [place] [address]);  Service: Endoscopy;  Laterality: N/A;    COLONOSCOPY N/A 9/29/2015    Performed by FIDELINA Sanchez MD at [facility] (4TH FLR)    EGD (ESOPHAGOGASTRODUODENOSCOPY) N/A 3/11/2014    Performed by Federico Escobar MD at [facility] (4TH FLR)    EGD (ESOPHAGOGASTRODUODENOSCOPY) N/A  11/5/2013    Per[facility] by [facility] at [facility] (4TH FLR)    ESOPHAGOGASTRODUODENOSCOPY (EGD) N/A 10/4/2017    Performed by Mg Morton MD at [facility] ENDO    EYE SURGERY      INJECTION-STEROID-EPIDURAL-TRANSFORAMINAL Right 9/20/2017    Performed by [facility] at [facility] PAIN MGT    JOINT REPLACEMENT      right knee    KNEE SURGERY Left 12/31/2013    TKR    left parotidectomy      mixed tumor    SALIVARY GLAND SURGERY      TONSILLECTOMY         Review of patient's allergies indicates:  No Known Allergies    No current facility-administered medications on file prior to encounter.      Current Outpatient Medications on File Prior to Encounter   Medication Sig    aspirin (ECOTRIN) 81 MG EC tablet Take 81 mg by mouth once daily.    baclofen (LIORESAL) 10 MG tablet Take 1 tablet (10 mg total) by mouth 3 (three) times daily.    carvedilol (COREG) 12.5 MG tablet Take 1 tablet (12.5 mg total) by mouth 2 (two) times daily with meals.    clopidogrel (PLAVIX) 75 mg tablet 1 tablet (75 mg total) by Per G Tube route once daily. (Patient taking differently: Take 75 mg by mouth once daily. )    furosemide (LASIX) 40 MG tablet Take 1 tablet (40 mg total) by mouth 2 (two) times daily. (Patient taking differently: Take 40 mg by mouth once daily. )    gabapentin (NEURONTIN) 300 MG capsule Take 1 capsule (300 mg total) by mouth 3 (three) times daily.    HYDROcodone-acetaminophen (NORCO) 5-325 mg per tablet Take 1 tablet by mouth every 12 (twelve) hours as needed for Pain.    hydroxychloroquine (PLAQUENIL) 200 mg tablet Take 2 tablets (400 mg total) by mouth once daily.    magnesium oxide (MAG-OX) 400 mg tablet Take 400 mg by mouth once daily.    montelukast (SINGULAIR) 10 mg tablet Take 1 tablet (10 mg total) by mouth every evening.    pantoprazole (PROTONIX) 40 mg GrPS 1 packet (40 mg total) by Per G Tube route once daily. (Patient taking differently: Take 20 mg by mouth once daily. )    potassium  chloride SA (K-DUR,KLOR-CON) 10 MEQ tablet Take 2 tablets (20 mEq total) by mouth once daily.    predniSONE (DELTASONE) 10 MG tablet Take 2 tablets (20 mg total) by mouth once daily.    predniSONE (DELTASONE) 5 MG tablet Take 1 tablet (5 mg total) by mouth once daily. Take 4 in the am and 3 in the evening for 2 weeks - then  Take 3 in the am and 3 in the evening for 2 weeks - then  Take 3 in the am and 2 in the evening for 2 weeks - consult with rheumatology for further advice (Patient taking differently: Take 10 mg by mouth once daily. Take 4 in the am and 3 in the evening for 2 weeks - then  Take 3 in the am and 3 in the evening for 2 weeks - then  Take 3 in the am and 2 in the evening for 2 weeks - consult with rheumatology for further advice)    thiamine 100 MG tablet Take 100 mg by mouth once daily.     Family History     Problem Relation (Age of Onset)    Breast cancer Maternal Grandmother    Cancer Father    Heart disease Mother    Hypertension Mother    Prostate cancer Father        Tobacco Use    Smoking status: Never Smoker    Smokeless tobacco: Never Used   Substance and Sexual Activity    Alcohol use: No     Alcohol/week: 0.0 oz     Comment: very seldom     Drug use: No    Sexual activity: No     Comment: ,  age 52,      Review of Systems   Constitutional: Positive for activity change. Negative for fever.   HENT: Negative for congestion.    Respiratory: Positive for shortness of breath. Negative for cough.    Cardiovascular: Negative for chest pain.   Gastrointestinal: Negative for nausea.   Genitourinary: Negative for dysuria.   Musculoskeletal: Negative for back pain.   Skin: Negative for wound.   Neurological: Negative for headaches.   Psychiatric/Behavioral: Negative for dysphoric mood.     Objective:     Vital Signs (Most Recent):  Temp: 98.6 °F (37 °C) (10/23/18 2022)  Pulse: 87 (10/23/18 2336)  Resp: (!) 24 (10/23/18 2336)  BP: (!) 153/84 (10/23/18 2336)  SpO2: 97 % (10/23/18  2336) Vital Signs (24h Range):  Temp:  [98.6 °F (37 °C)-98.9 °F (37.2 °C)] 98.6 °F (37 °C)  Pulse:  [82-96] 87  Resp:  [17-24] 24  SpO2:  [95 %-98 %] 97 %  BP: (130-153)/(79-84) 153/84     Weight: 72.6 kg (160 lb)  Body mass index is 29.26 kg/m².    Physical Exam   Constitutional: She is oriented to person, place, and time. She appears well-developed and well-nourished. No distress.   HENT:   Head: Normocephalic and atraumatic.   Eyes: Conjunctivae are normal. No scleral icterus.   Neck: Normal range of motion.   Cardiovascular: Normal rate, regular rhythm, normal heart sounds and intact distal pulses.   Pulmonary/Chest: Effort normal and breath sounds normal.   Abdominal: Soft. She exhibits no distension. There is no tenderness.   Musculoskeletal: Normal range of motion. She exhibits edema (2+ bilateral LE).   Neurological: She is alert and oriented to person, place, and time.   Skin: Skin is warm and dry.   Psychiatric: She has a normal mood and affect. Her behavior is normal.           Significant Labs: All pertinent labs within the past 24 hours have been reviewed.    Significant Imaging: I have reviewed and interpreted all pertinent imaging results/findings within the past 24 hours.

## 2018-10-24 NOTE — HPI
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  She was admitted to  on 4/5/2018 to 05/02/2018 (27 days) with bilateral pulmonary infiltrates, presumptively treated as PNA (please refer to their extensive discharge summary for details).  She eventually followed up with Pulmonary, Dr. Francis on 10/17/18, who noted, "I was worried that Dr. Lux had recurrence/relapse of acute hypoxemic respiratory failure with bilateral CXR infiltrates in the setting of her steroid taper.  This seemed very similar to her illness in Spring 2018 that resulted in prolonged ICU/hospital stay without a definitive diagnosis proven.  Last week we decided to bump her prednisone dose up to 30 mg daily, in addition to increasing her diuretic and completing a course of levofloxacin.  She only took a day of the increased diuretic dose due to severe leg cramps.  As a result, she has been taking 40 mg daily of furosemide.  She noticed significant improvement after ~5 days of treatment as outlined above.  She and her friends noted that she is doing "better", although she still gets a little shortwinded during conversation."     She had a follow up CXR done which revealed continued bilateral infiltrates, so she was referred to ID clinic.  She came for her appt with ID today and her O2 sats were found to be 80% with any exertion (not hypoxic sitting).  Dr. Ocasio feels bacterial PNA is unlikely at this point, so does not necessarily recommend empiric therapy with antibiotics.  She does have some bilateral lower leg swelling, chonic.        "

## 2018-10-25 LAB
ALBUMIN SERPL BCP-MCNC: 2.7 G/DL
ALP SERPL-CCNC: 52 U/L
ALT SERPL W/O P-5'-P-CCNC: 6 U/L
ANION GAP SERPL CALC-SCNC: 5 MMOL/L
ANISOCYTOSIS BLD QL SMEAR: SLIGHT
AST SERPL-CCNC: 7 U/L
BASOPHILS # BLD AUTO: 0.02 K/UL
BASOPHILS NFR BLD: 0.1 %
BILIRUB SERPL-MCNC: 0.6 MG/DL
BUN SERPL-MCNC: 16 MG/DL
CALCIUM SERPL-MCNC: 9 MG/DL
CHLORIDE SERPL-SCNC: 99 MMOL/L
CO2 SERPL-SCNC: 36 MMOL/L
CREAT SERPL-MCNC: 1 MG/DL
CRYPTOC AG SER QL LA: NEGATIVE
DIFFERENTIAL METHOD: ABNORMAL
EOSINOPHIL # BLD AUTO: 0.2 K/UL
EOSINOPHIL NFR BLD: 1.1 %
ERYTHROCYTE [DISTWIDTH] IN BLOOD BY AUTOMATED COUNT: 14.2 %
EST. GFR  (AFRICAN AMERICAN): >60 ML/MIN/1.73 M^2
EST. GFR  (NON AFRICAN AMERICAN): 53 ML/MIN/1.73 M^2
GLUCOSE SERPL-MCNC: 112 MG/DL
HCT VFR BLD AUTO: 36.2 %
HGB BLD-MCNC: 11 G/DL
HYPOCHROMIA BLD QL SMEAR: ABNORMAL
IMM GRANULOCYTES # BLD AUTO: 0.14 K/UL
IMM GRANULOCYTES NFR BLD AUTO: 0.8 %
LYMPHOCYTES # BLD AUTO: 2.7 K/UL
LYMPHOCYTES NFR BLD: 16 %
MAGNESIUM SERPL-MCNC: 2.1 MG/DL
MCH RBC QN AUTO: 27 PG
MCHC RBC AUTO-ENTMCNC: 30.4 G/DL
MCV RBC AUTO: 89 FL
MONOCYTES # BLD AUTO: 2.8 K/UL
MONOCYTES NFR BLD: 16.6 %
NEUTROPHILS # BLD AUTO: 11.1 K/UL
NEUTROPHILS NFR BLD: 65.4 %
NRBC BLD-RTO: 0 /100 WBC
OVALOCYTES BLD QL SMEAR: ABNORMAL
PHOSPHATE SERPL-MCNC: 3.3 MG/DL
PLATELET # BLD AUTO: 192 K/UL
PMV BLD AUTO: 10.3 FL
POIKILOCYTOSIS BLD QL SMEAR: SLIGHT
POLYCHROMASIA BLD QL SMEAR: ABNORMAL
POTASSIUM SERPL-SCNC: 3.4 MMOL/L
PROT SERPL-MCNC: 5.7 G/DL
RBC # BLD AUTO: 4.07 M/UL
SODIUM SERPL-SCNC: 140 MMOL/L
STRONGYLOIDES ANTIBODY IGG: NEGATIVE
WBC # BLD AUTO: 16.95 K/UL

## 2018-10-25 PROCEDURE — 87385 HISTOPLASMA CAPSUL AG IA: CPT

## 2018-10-25 PROCEDURE — 80053 COMPREHEN METABOLIC PANEL: CPT

## 2018-10-25 PROCEDURE — 85025 COMPLETE CBC W/AUTO DIFF WBC: CPT

## 2018-10-25 PROCEDURE — G8989 SELF CARE D/C STATUS: HCPCS | Mod: CJ

## 2018-10-25 PROCEDURE — 86635 COCCIDIOIDES ANTIBODY: CPT

## 2018-10-25 PROCEDURE — 25000003 PHARM REV CODE 250: Performed by: HOSPITALIST

## 2018-10-25 PROCEDURE — 83735 ASSAY OF MAGNESIUM: CPT

## 2018-10-25 PROCEDURE — 87305 ASPERGILLUS AG IA: CPT

## 2018-10-25 PROCEDURE — 97161 PT EVAL LOW COMPLEX 20 MIN: CPT

## 2018-10-25 PROCEDURE — 63600175 PHARM REV CODE 636 W HCPCS: Performed by: HOSPITALIST

## 2018-10-25 PROCEDURE — 25000003 PHARM REV CODE 250: Performed by: STUDENT IN AN ORGANIZED HEALTH CARE EDUCATION/TRAINING PROGRAM

## 2018-10-25 PROCEDURE — G8988 SELF CARE GOAL STATUS: HCPCS | Mod: CH

## 2018-10-25 PROCEDURE — 86612 BLASTOMYCES ANTIBODY: CPT

## 2018-10-25 PROCEDURE — 97165 OT EVAL LOW COMPLEX 30 MIN: CPT

## 2018-10-25 PROCEDURE — 99233 SBSQ HOSP IP/OBS HIGH 50: CPT | Mod: GC,,, | Performed by: HOSPITALIST

## 2018-10-25 PROCEDURE — 87449 NOS EACH ORGANISM AG IA: CPT

## 2018-10-25 PROCEDURE — 11000001 HC ACUTE MED/SURG PRIVATE ROOM

## 2018-10-25 PROCEDURE — 25000003 PHARM REV CODE 250

## 2018-10-25 PROCEDURE — 99233 SBSQ HOSP IP/OBS HIGH 50: CPT | Mod: GC,,, | Performed by: INTERNAL MEDICINE

## 2018-10-25 PROCEDURE — G8987 SELF CARE CURRENT STATUS: HCPCS | Mod: CJ

## 2018-10-25 PROCEDURE — 84100 ASSAY OF PHOSPHORUS: CPT

## 2018-10-25 PROCEDURE — 63600175 PHARM REV CODE 636 W HCPCS

## 2018-10-25 PROCEDURE — 97535 SELF CARE MNGMENT TRAINING: CPT

## 2018-10-25 RX ORDER — POTASSIUM CHLORIDE 20 MEQ/1
20 TABLET, EXTENDED RELEASE ORAL ONCE
Status: COMPLETED | OUTPATIENT
Start: 2018-10-25 | End: 2018-10-25

## 2018-10-25 RX ORDER — PREDNISONE 20 MG/1
40 TABLET ORAL DAILY
Status: DISCONTINUED | OUTPATIENT
Start: 2018-10-25 | End: 2018-10-26 | Stop reason: HOSPADM

## 2018-10-25 RX ORDER — POTASSIUM CHLORIDE 20 MEQ/1
20 TABLET, EXTENDED RELEASE ORAL DAILY
Status: CANCELLED | OUTPATIENT
Start: 2018-10-25

## 2018-10-25 RX ORDER — FUROSEMIDE 20 MG/1
20 TABLET ORAL 2 TIMES DAILY
Status: ON HOLD | COMMUNITY
End: 2018-12-31 | Stop reason: SDUPTHER

## 2018-10-25 RX ORDER — FUROSEMIDE 40 MG/1
40 TABLET ORAL 2 TIMES DAILY
Status: DISCONTINUED | OUTPATIENT
Start: 2018-10-25 | End: 2018-10-26 | Stop reason: HOSPADM

## 2018-10-25 RX ORDER — CLOPIDOGREL BISULFATE 75 MG/1
75 TABLET ORAL DAILY
COMMUNITY
End: 2019-03-06 | Stop reason: SDUPTHER

## 2018-10-25 RX ORDER — ACETAMINOPHEN 500 MG
1000 TABLET ORAL DAILY PRN
Status: ON HOLD | COMMUNITY
End: 2019-09-05 | Stop reason: HOSPADM

## 2018-10-25 RX ORDER — PREDNISONE 10 MG/1
30 TABLET ORAL DAILY
Status: ON HOLD | COMMUNITY
End: 2018-10-26 | Stop reason: HOSPADM

## 2018-10-25 RX ADMIN — POTASSIUM CHLORIDE 20 MEQ: 1500 TABLET, EXTENDED RELEASE ORAL at 12:10

## 2018-10-25 RX ADMIN — MAGNESIUM OXIDE TAB 400 MG (241.3 MG ELEMENTAL MG) 400 MG: 400 (241.3 MG) TAB at 09:10

## 2018-10-25 RX ADMIN — PREDNISONE 40 MG: 20 TABLET ORAL at 09:10

## 2018-10-25 RX ADMIN — FUROSEMIDE 40 MG: 10 INJECTION, SOLUTION INTRAMUSCULAR; INTRAVENOUS at 09:10

## 2018-10-25 RX ADMIN — ENOXAPARIN SODIUM 40 MG: 100 INJECTION SUBCUTANEOUS at 05:10

## 2018-10-25 RX ADMIN — BACLOFEN 10 MG: 10 TABLET ORAL at 04:10

## 2018-10-25 RX ADMIN — PIPERACILLIN AND TAZOBACTAM 4.5 G: 4; .5 INJECTION, POWDER, LYOPHILIZED, FOR SOLUTION INTRAVENOUS; PARENTERAL at 09:10

## 2018-10-25 RX ADMIN — PANTOPRAZOLE SODIUM 20 MG: 20 TABLET, DELAYED RELEASE ORAL at 09:10

## 2018-10-25 RX ADMIN — PIPERACILLIN AND TAZOBACTAM 4.5 G: 4; .5 INJECTION, POWDER, LYOPHILIZED, FOR SOLUTION INTRAVENOUS; PARENTERAL at 04:10

## 2018-10-25 RX ADMIN — HYDROXYCHLOROQUINE SULFATE 400 MG: 200 TABLET, FILM COATED ORAL at 09:10

## 2018-10-25 RX ADMIN — FUROSEMIDE 40 MG: 40 TABLET ORAL at 05:10

## 2018-10-25 RX ADMIN — GABAPENTIN 300 MG: 300 CAPSULE ORAL at 09:10

## 2018-10-25 RX ADMIN — CARVEDILOL 12.5 MG: 12.5 TABLET, FILM COATED ORAL at 09:10

## 2018-10-25 RX ADMIN — CLOPIDOGREL 75 MG: 75 TABLET, FILM COATED ORAL at 09:10

## 2018-10-25 RX ADMIN — VANCOMYCIN HYDROCHLORIDE 1000 MG: 1 INJECTION, POWDER, LYOPHILIZED, FOR SOLUTION INTRAVENOUS at 05:10

## 2018-10-25 RX ADMIN — BACLOFEN 10 MG: 10 TABLET ORAL at 09:10

## 2018-10-25 RX ADMIN — ASPIRIN 81 MG: 81 TABLET, COATED ORAL at 09:10

## 2018-10-25 RX ADMIN — GABAPENTIN 300 MG: 300 CAPSULE ORAL at 08:10

## 2018-10-25 RX ADMIN — BACLOFEN 10 MG: 10 TABLET ORAL at 08:10

## 2018-10-25 RX ADMIN — CARVEDILOL 12.5 MG: 12.5 TABLET, FILM COATED ORAL at 05:10

## 2018-10-25 RX ADMIN — GABAPENTIN 300 MG: 300 CAPSULE ORAL at 04:10

## 2018-10-25 RX ADMIN — PIPERACILLIN AND TAZOBACTAM 4.5 G: 4; .5 INJECTION, POWDER, LYOPHILIZED, FOR SOLUTION INTRAVENOUS; PARENTERAL at 12:10

## 2018-10-25 RX ADMIN — AZITHROMYCIN MONOHYDRATE 250 MG: 250 TABLET ORAL at 09:10

## 2018-10-25 RX ADMIN — Medication 100 MG: at 09:10

## 2018-10-25 NOTE — HOSPITAL COURSE
Patient was admitted to [facility] for evaluation of acute on chronic respiratory failure. She was continued on vanc, zosyn, and azithromycin. She was also started on Lasix 40mg BID due to concern that her acute respiratory failure was due to underlying CHF. Pulmonology consulted for possible bronchoscopy and ID consulted recommendation for antibiotic coverage. ID recommended continued antibiotic coverage due to history of patient improving on a previous hospitalization with steroids and antibiotics for similar presentation.Pulmonary recommended continued prednisone 40mg and bronchoscopy. They believed that the patient's acute condition was most likely due to steroid responding lung disease and was less likely due to an infectious etiology. The patient declined bronchoscopy. Echo showed EF 60-65% and Lasix was discontinued due to low likelihood that respiratory failure was due to cardiogenic etiology.

## 2018-10-25 NOTE — ASSESSMENT & PLAN NOTE
-Pulmonology consulted for bronchoscope but patient declining at this time  -Infectious Disease consulted and recommended coverage for HCAP with vanco, unasyn,and azithromycin  -Pulmonology believes condition most likely due steroid responsive disease   -Low suspicion of acute deterioration due to aspiration PNA but will continue to cover empirical antiboitic coverage; Low concern etiology due for CHF due to echo w/ EF 60-65% and noted normal atrial pressure    -Will switch lasix from IV to home oral dose of Lasix 40mg BID  -Will continuing vanco, unasyn,and azithromycin  -continue Prednisone 40mg daily

## 2018-10-25 NOTE — PLAN OF CARE
Problem: Patient Care Overview  Goal: Plan of Care Review  Outcome: Ongoing (interventions implemented as appropriate)  No acute events throughout night. Able to express needs. Does  not like to take meds at scheduled times.     Incontinent of  B&B at times.   amb independently around room.  VSS.  Occasional mild confusion. Fall risk reviewed with pt and family.  Call light within reach, bed in lowest position with wheels locked.  Will cont to monitor.

## 2018-10-25 NOTE — CONSULTS
"[facility]  Pulmonology  Consult Note    Patient Name: Selma Alonzo Lux MD  MRN: [id number]  Admission Date: 10/23/2018  Hospital Length of Stay: 2 days  Code Status: Full Code  Attending Physician: Cliff Prado, *  Primary Care Provider: Bhargav Hirsch MD   Principal Problem: Acute on chronic respiratory failure    Consults  Subjective:     Interval History: Pt states her SOB is at her baseline. She feels like her respiratory function is at a level that she has adapted to. She denies cough, fever, chills. She states she feels well and that she appreciates all the attention and studies done for her, but that she knows she is not sick.    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  She was admitted to  on 4/5/2018 to 05/02/2018 (27 days) with bilateral pulmonary infiltrates, presumptively treated as PNA (please refer to their extensive discharge summary for details).  She eventually followed up with Pulmonary, Dr. Francis on 10/17/18, who noted, "I was worried that DrMolly Lux had recurrence/relapse of acute hypoxemic respiratory failure with bilateral CXR infiltrates in the setting of her steroid taper.  This seemed very similar to her illness in Spring 2018 that resulted in prolonged ICU/hospital stay without a definitive diagnosis proven.  Last week we decided to bump her prednisone dose up to 30 mg daily, in addition to increasing her diuretic to 40mg lasix in the am and 40mg in the pm and completing a course of levofloxacin.  She only took a day of the increased diuretic dose due to severe leg cramps.  As a result, she has been taking 40 mg am and 20 pm of furosemide.  She noticed significant improvement after ~5 days of treatment as outlined above.  She and her friends noted that she is doing "better", although she still gets a little shortwinded during conversation."     She had a follow up CXR done " which revealed continued bilateral infiltrates, so she was referred to ID clinic.  She came for her appt with ID today and her O2 sats were found to be 80% with any exertion (not hypoxic sitting).  Dr. Ocasio feels bacterial PNA is unlikely at this point, so does not necessarily recommend empiric therapy with antibiotics.  She does have some bilateral lower leg swelling which she says is markedly worse over the last 3 weeks.        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    Hypertension     IBS (irritable bowel syndrome) 6/21/2017    IGT (impaired glucose tolerance) 1/12/2016    Iron deficiency anemia secondary to inadequate dietary iron intake 6/24/2013    Joint pain     Keratoconjunctivitis sicca of both eyes not specified as Sjogren's 7/29/2016    Leiomyoma of uterus, unspecified 9/16/2014    Long QT interval 6/29/2016    Due to medication (plaquenil)     Macular edema 1/12/2015    Multinodular goiter 1/12/2016    Neuropathy 8/23/2017    Plaquenil causing adverse effect in therapeutic use 10/7/2016    Pneumococcal vaccine refused 8/17/2016    Pneumonia due to Streptococcus pneumoniae 4/5/2018    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/2015    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    s/p Right Total knee replacement 5/15/2013    Scoliosis of thoracic spine 5/15/2013    Small vessel disease, cerebrovascular 12/28/2017    Stroke     Vascular dementia 12/6/2017       Past Surgical History:   Procedure  Laterality Date    CATARACT EXTRACTION      COLONOSCOPY N/A 9/29/2015    Procedure: COLONOSCOPY;  Surgeon: FIDELINA Sanchez MD;  Location: [facility] (4TH FLR);  Service: Endoscopy;  Laterality: N/A;    COLONOSCOPY N/A 9/29/2015    Performed by FIDELINA Sanchez MD at [facility] (4TH FLR)    EGD (ESOPHAGOGASTRODUODENOSCOPY) N/A 3/11/2014    Performed by Federico Escobar [facility] at [facility] ENDO (4TH FLR)    EGD (ESOPHAGOGASTRODUODENOSCOPY) N/A 11/5/2013    Performed by Federico Escobar MD at [facility] (4TH FLR)    ESOPHAGOGASTRODUODENOSCOPY (EGD) N/A 10/4/2017    Performed by Mg Morton MD at [facility] ENDO    EYE SURGERY      INJECTION-STEROID-EPIDURAL-TRANSFORAMINAL Right 9/20/2017    Performed by [facility]elin [facility]dez [facility] at [facility] PAIN MGT    JOINT REPLACEMENT      right knee    KNEE SURGERY Left 12/31/2013    TKR    left parotidectomy      mixed tumor    SALIVARY GLAND SURGERY      TONSILLECTOMY         Review of patient's allergies indicates:  No Known Allergies    Family History     Problem Relation (Age of Onset)    Breast cancer Maternal Grandmother    Cancer Father    Heart disease Mother    Hypertension Mother    Prostate cancer Father        Tobacco Use    Smoking status: Never Smoker    Smokeless tobacco: Never Used   Substance and Sexual Activity    Alcohol use: No     Alcohol/week: 0.0 oz     Comment: very seldom     Drug use: No    Sexual activity: No     Comment: ,  age 52,          Review of Systems   Constitutional: Positive for activity change. Negative for appetite change, chills, fatigue and fever.   HENT: Negative.    Eyes: Negative.    Respiratory: Positive for shortness of breath (on exertion only, pt states this is at her baseline). Negative for cough and chest tightness.    Cardiovascular: Positive for leg swelling (improving, right more improved than the LLE). Negative for chest pain and palpitations.   Gastrointestinal: Negative.    Endocrine: Negative.    Genitourinary: Negative.     Musculoskeletal: Negative.    Skin: Negative.    Allergic/Immunologic: Negative.    Neurological: Negative.    Hematological: Negative.    Psychiatric/Behavioral: Negative.      Objective:     Vital Signs (Most Recent):  Temp: 98 °F (36.7 °C) (10/25/18 0835)  Pulse: 83 (10/25/18 0835)  Resp: (!) 22 (10/25/18 0835)  BP: 122/76 (10/25/18 0835)  SpO2: 97 % (10/25/18 0835) Vital Signs (24h Range):  Temp:  [97.5 °F (36.4 °C)-98.7 °F (37.1 °C)] 98 °F (36.7 °C)  Pulse:  [72-86] 83  Resp:  [16-22] 22  SpO2:  [93 %-98 %] 97 %  BP: (120-167)/(60-80) 122/76     Weight: 72.6 kg (160 lb)  Body mass index is 29.26 kg/m².      Intake/Output Summary (Last 24 hours) at 10/25/2018 1132  Last data filed at 10/25/2018 0600  Gross per 24 hour   Intake 800 ml   Output --   Net 800 ml       Physical Exam   Constitutional: She is oriented to person, place, and time. She appears well-developed and well-nourished. No distress.   HENT:   Head: Normocephalic and atraumatic.   Eyes: EOM are normal. Pupils are equal, round, and reactive to light.   Neck: Normal range of motion. Neck supple.   Cardiovascular: Normal rate, regular rhythm, normal heart sounds and intact distal pulses.   Pulmonary/Chest: Effort normal and breath sounds normal.   Abdominal: Soft. Bowel sounds are normal. She exhibits no distension.   Musculoskeletal: Normal range of motion. She exhibits edema (to BLE, worse on the left). She exhibits no deformity.   Neurological: She is alert and oriented to person, place, and time.   Skin: Skin is warm and dry. She is not diaphoretic.   Psychiatric: She has a normal mood and affect. Her behavior is normal. Judgment and thought content normal.   Nursing note and vitals reviewed.      Vents:       Lines/Drains/Airways     Drain                 Gastrostomy/Enterostomy 04/30/18 1654 Gastrostomy tube w/ balloon LUQ feeding 177 days          Peripheral Intravenous Line                 Peripheral IV - Single Lumen 10/23/18 5178  Distal;Left;Posterior Forearm 1 day                Significant Labs:    CBC/Anemia Profile:  Recent Labs   Lab 10/23/18  2342 10/24/18  0634 10/25/18  0634   WBC 15.88* 17.54* 16.95*   HGB 11.9* 11.4* 11.0*   HCT 39.2 37.5 36.2*    205 192   MCV 90 90 89   RDW 14.1 14.2 14.2        Chemistries:  Recent Labs   Lab 10/23/18  2342 10/24/18  0634 10/25/18  0634    140 140   K 4.1 3.5 3.4*   CL 98 100 99   CO2 33* 29 36*   BUN 19 16 16   CREATININE 1.1 0.8 1.0   CALCIUM 9.2 8.7 9.0   ALBUMIN 3.0* 2.6* 2.7*   PROT 6.5 5.6* 5.7*   BILITOT 0.5 0.5 0.6   ALKPHOS 62 59 52*   ALT 8* 7* 6*   AST 7* 7* 7*   MG 2.2 2.0 2.1   PHOS 2.9 2.6* 3.3       ABGs:   Recent Labs   Lab 10/23/18  2306   PH 7.485*   PCO2 46.0*   HCO3 34.6*   POCSATURATED 92*   BE 11     CMP:   Recent Labs   Lab 10/23/18  2342 10/24/18  0634 10/25/18  0634    140 140   K 4.1 3.5 3.4*   CL 98 100 99   CO2 33* 29 36*   * 236* 112*   BUN 19 16 16   CREATININE 1.1 0.8 1.0   CALCIUM 9.2 8.7 9.0   PROT 6.5 5.6* 5.7*   ALBUMIN 3.0* 2.6* 2.7*   BILITOT 0.5 0.5 0.6   ALKPHOS 62 59 52*   AST 7* 7* 7*   ALT 8* 7* 6*   ANIONGAP 8 11 5*   EGFRNONAA 47.2* >60.0 53.0*     Respiratory Culture: No results for input(s): GSRESP, RESPIRATORYC in the last 48 hours.    Significant Imaging:   I have reviewed all pertinent imaging results/findings within the past 24 hours.    Assessment/Plan:     * Acute on chronic respiratory failure    Impression:  1.  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  2.  Chronic immunosuppresion.     Plan:  1.  Will follow patient's wishes and not schedule a bronchoscopy as she states that the only anurag in her life is singing and her voice has just recovered from previous prolonged intubation and hospitalization for a similar process that was steroid responsive.  Although superimposed infection is on the differential, I feel that it  is less likely.  Patient will agree to induced sputum for respiratory cultures to include AFB, routine and fungal.    2.  Continue prednisone 40mg daily  3. Ok to de-escalate/stop antibiotics, as this is most likely not an infectious process.  4. Pt is existing pt in pulm clinic, and can f/u after discharge.           Thank you for your consult. I will sign off. Please contact us if you have any additional questions.     Amber Fournier MD  Pulmonology  [facility]

## 2018-10-25 NOTE — SUBJECTIVE & OBJECTIVE
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    Hypertension     IBS (irritable bowel syndrome) 6/21/2017    IGT (impaired glucose tolerance) 1/12/2016    Iron deficiency anemia secondary to inadequate dietary iron intake 6/24/2013    Joint pain     Keratoconjunctivitis sicca of both eyes not specified as Sjogren's 7/29/2016    Leiomyoma of uterus, unspecified 9/16/2014    Long QT interval 6/29/2016    Due to medication (plaquenil)     Macular edema 1/12/2015    Multinodular goiter 1/12/2016    Neuropathy 8/23/2017    Plaquenil causing adverse effect in therapeutic use 10/7/2016    Pneumococcal vaccine refused 8/17/2016    Pneumonia due to Streptococcus pneumoniae 4/5/2018    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/2015    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    s/p Right Total knee replacement 5/15/2013    Scoliosis of thoracic spine 5/15/2013    Small vessel disease, cerebrovascular 12/28/2017    Stroke     Vascular dementia 12/6/2017       Past Surgical History:   Procedure Laterality Date    CATARACT EXTRACTION      COLONOSCOPY N/A 9/29/2015    Procedure: COLONOSCOPY;  Surgeon: FIDELINA Sanchez MD;  Location: [facility] ([address] [place] [address]);  Service: Endoscopy;  Laterality: N/A;    COLONOSCOPY N/A 9/29/2015    Performed by FIDELINA Sanchez MD at [facility] (4TH FLR)    EGD (ESOPHAGOGASTRODUODENOSCOPY) N/A 3/11/2014    Performed by Federico Escobar MD at [facility] (4TH FLR)    EGD (ESOPHAGOGASTRODUODENOSCOPY) N/A  11/5/2013    Performed by [facility] at [facility] (4TH FLR)    ESOPHAGOGASTRODUODENOSCOPY (EGD) N/A 10/4/2017    Performed by Mg Morton MD at [facility] ENDO    EYE SURGERY      INJECTION-STEROID-EPIDURAL-TRANSFORAMINAL Right 9/20/2017    Performed by Joselin Valdez MD at [facility] [place] PAIN MGT    JOINT REPLACEMENT      right knee    KNEE SURGERY Left 12/31/2013    TKR    left parotidectomy      mixed tumor    SALIVARY GLAND SURGERY      TONSILLECTOMY         Review of patient's allergies indicates:  No Known Allergies    Family History     Problem Relation (Age of Onset)    Breast cancer Maternal Grandmother    Cancer Father    Heart disease Mother    Hypertension Mother    Prostate cancer Father        Tobacco Use    Smoking status: Never Smoker    Smokeless tobacco: Never Used   Substance and Sexual Activity    Alcohol use: No     Alcohol/week: 0.0 oz     Comment: very seldom     Drug use: No    Sexual activity: No     Comment: ,  age 52,          Review of Systems   Constitutional: Positive for activity change. Negative for appetite change, chills, fatigue and fever.   HENT: Negative.    Eyes: Negative.    Respiratory: Positive for shortness of breath (on exertion only, pt states this is at her baseline). Negative for cough and chest tightness.    Cardiovascular: Positive for leg swelling (improving, right more improved than the LLE). Negative for chest pain and palpitations.   Gastrointestinal: Negative.    Endocrine: Negative.    Genitourinary: Negative.    Musculoskeletal: Negative.    Skin: Negative.    Allergic/Immunologic: Negative.    Neurological: Negative.    Hematological: Negative.    Psychiatric/Behavioral: Negative.      Objective:     Vital Signs (Most Recent):  Temp: 98 °F (36.7 °C) (10/25/18 0835)  Pulse: 83 (10/25/18 0835)  Resp: (!) 22 (10/25/18 0835)  BP: 122/76 (10/25/18 0835)  SpO2: 97 % (10/25/18 0835) Vital Signs (24h Range):  Temp:  [97.5 °F (36.4 °C)-98.7 °F (37.1  °C)] 98 °F (36.7 °C)  Pulse:  [72-86] 83  Resp:  [16-22] 22  SpO2:  [93 %-98 %] 97 %  BP: (120-167)/(60-80) 122/76     Weight: 72.6 kg (160 lb)  Body mass index is 29.26 kg/m².      Intake/Output Summary (Last 24 hours) at 10/25/2018 1132  Last data filed at 10/25/2018 0600  Gross per 24 hour   Intake 800 ml   Output --   Net 800 ml       Physical Exam   Constitutional: She is oriented to person, place, and time. She appears well-developed and well-nourished. No distress.   HENT:   Head: Normocephalic and atraumatic.   Eyes: EOM are normal. Pupils are equal, round, and reactive to light.   Neck: Normal range of motion. Neck supple.   Cardiovascular: Normal rate, regular rhythm, normal heart sounds and intact distal pulses.   Pulmonary/Chest: Effort normal and breath sounds normal.   Abdominal: Soft. Bowel sounds are normal. She exhibits no distension.   Musculoskeletal: Normal range of motion. She exhibits edema (to BLE, worse on the left). She exhibits no deformity.   Neurological: She is alert and oriented to person, place, and time.   Skin: Skin is warm and dry. She is not diaphoretic.   Psychiatric: She has a normal mood and affect. Her behavior is normal. Judgment and thought content normal.   Nursing note and vitals reviewed.      Vents:       Lines/Drains/Airways     Drain                 Gastrostomy/Enterostomy 04/30/18 1654 Gastrostomy tube w/ balloon LUQ feeding 177 days          Peripheral Intravenous Line                 Peripheral IV - Single Lumen 10/23/18 2215 Distal;Left;Posterior Forearm 1 day                Significant Labs:    CBC/Anemia Profile:  Recent Labs   Lab 10/23/18  2342 10/24/18  0634 10/25/18  0634   WBC 15.88* 17.54* 16.95*   HGB 11.9* 11.4* 11.0*   HCT 39.2 37.5 36.2*    205 192   MCV 90 90 89   RDW 14.1 14.2 14.2        Chemistries:  Recent Labs   Lab 10/23/18  2342 10/24/18  0634 10/25/18  0634    140 140   K 4.1 3.5 3.4*   CL 98 100 99   CO2 33* 29 36*   BUN 19 16 16    CREATININE 1.1 0.8 1.0   CALCIUM 9.2 8.7 9.0   ALBUMIN 3.0* 2.6* 2.7*   PROT 6.5 5.6* 5.7*   BILITOT 0.5 0.5 0.6   ALKPHOS 62 59 52*   ALT 8* 7* 6*   AST 7* 7* 7*   MG 2.2 2.0 2.1   PHOS 2.9 2.6* 3.3       ABGs:   Recent Labs   Lab 10/23/18  2306   PH 7.485*   PCO2 46.0*   HCO3 34.6*   POCSATURATED 92*   BE 11     CMP:   Recent Labs   Lab 10/23/18  2342 10/24/18  0634 10/25/18  0634    140 140   K 4.1 3.5 3.4*   CL 98 100 99   CO2 33* 29 36*   * 236* 112*   BUN 19 16 16   CREATININE 1.1 0.8 1.0   CALCIUM 9.2 8.7 9.0   PROT 6.5 5.6* 5.7*   ALBUMIN 3.0* 2.6* 2.7*   BILITOT 0.5 0.5 0.6   ALKPHOS 62 59 52*   AST 7* 7* 7*   ALT 8* 7* 6*   ANIONGAP 8 11 5*   EGFRNONAA 47.2* >60.0 53.0*     Respiratory Culture: No results for input(s): GSRESP, RESPIRATORYC in the last 48 hours.    Significant Imaging:   I have reviewed all pertinent imaging results/findings within the past 24 hours.

## 2018-10-25 NOTE — PHYSICIAN QUERY
"PT Name: Selma Lux MD  MR #: [id number]    Physician Query Form - Heart  Condition Clarification     CDS/: Doretha DUMONT     Contact information: [email or web address]  This form is a permanent document in the medical record.     Query Date: October 25, 2018    By submitting this query, we are merely seeking further clarification of documentation. Please utilize your independent clinical judgment when addressing the question(s) below.    The medical record contains the following   Indicators     Supporting Clinical Findings Location in Medical Record   X  Lab results 10/23   X EF 60-65% Echo 10/24   X Radiology findings There is cardiomegaly moderate edema and no change. Chest xray 10/24   X Echo Results Echo 10/24  CONCLUSIONS     1 - Severe left atrial enlargement.     2 - Concentric hypertrophy.     3 - Normal left ventricular systolic function (EF 60-65%).     4 - No wall motion abnormalities.     5 - Indeterminate LV diastolic function.     6 - Normal right ventricular systolic function .     7 - Moderate mitral regurgitation.     8 - The estimated PA systolic pressure is 27 mmHg.     9 - Trivial pericardial effusion.  Echo 10/24    "Ascites" documented     X "SOB" or "BEJARANO" documented She had a follow up CXR done which revealed continued bilateral infiltrates, so she was referred to ID clinic.  She came for her appt with ID today and her O2 sats were found to be 80% with any exertion (not hypoxic sitting).   Hospital medicine H&P   X "Hypoxia" documented presents with recurrence of acute respiratory failure with hypoxia, Attestation to H&P 10/24 1245 pm    Heart Failure documented     X "Edema" documented She does have some bilateral lower leg swelling, chonic.    Hospital medicine H&P   X Diuretics/Meds Carvedilol 12.5 mg po 2 times daily 10/24 0745  Furosemide  40 mg IV 2 times daily 10/24 0900 - 10/25 1003  Furosemide 40 mg po 2 times daily 10/25 1800 Medication sheets    " Treatment:      Other:      Heart failure (HF) can be acute, chronic or both. It is generally further specificed as systolic, diastolic, or combined. Lastly, it is important to identify an underlying etiology if known or suspected.     Common clues to acute exacerbation:  Rapidly progressive symptoms (w/in 2 weeks of presentation), using IV diuretics to treat, using supplemental O2, pulmonary edema on Xray, MI w/in 4 weeks, and/or BNP >500    Systolic Heart Failure: is defined as chart documentation of a left ventricular ejection fraction (LVEF) less than 40%     Diastolic Heart Failure: is defined as a left ventricular ejection fraction (LVEF) greater than 40%   +      Evidence of diastolic dysfunction on echocardiography OR    Right heart catheterization wedge pressure above 12 mm Hg OR    Left heart catheterization left ventricular end diastolic pressure 18 mm Hg or above.    References: *American Heart Association    The clinical guidelines noted below are only system guidelines, and do not replace the providers clinical judgment.     Provider, please specify the diagnosis associated with above clinical findings    [ x ] Acute on Chronic Diastolic Heart Failure -    Pre-existing diastoic HF diagnosis.  EF > 40%  and acute HF symptoms documented                                  [   ] Chronic Diastolic Heart Failure - Pre-existing diastolic HF diagnosis.  EF > 40%  without  acute HF symptoms documented    [   ] Other (please specify): ___________________________________    [   ] Clinically Undetermined                          Please document in your progress notes daily for the duration of treatment until resolved and include in your discharge summary.

## 2018-10-25 NOTE — SUBJECTIVE & OBJECTIVE
Interval History:  NAEON. She stated that she is only SOB on exertion. She denies chest pain, abdominal pain, nausea, and  vomiting.    Review of Systems   Constitutional: Negative for chills, fatigue and fever.   HENT: Negative for hearing loss and sore throat.    Eyes: Negative for visual disturbance.   Respiratory: Negative for cough.         Positive for SOB with exertion   Cardiovascular: Negative for chest pain.   Gastrointestinal: Negative for abdominal pain, constipation, diarrhea, nausea and vomiting.   Genitourinary: Negative for dysuria and frequency.   Musculoskeletal: Negative for arthralgias and myalgias.   Skin: Negative for rash.   Neurological: Negative for dizziness, weakness and headaches.     Objective:     Vital Signs (Most Recent):  Temp: 98 °F (36.7 °C) (10/25/18 0835)  Pulse: 83 (10/25/18 0835)  Resp: (!) 22 (10/25/18 0835)  BP: 122/76 (10/25/18 0835)  SpO2: 97 % (10/25/18 0835) Vital Signs (24h Range):  Temp:  [97.5 °F (36.4 °C)-98.7 °F (37.1 °C)] 98 °F (36.7 °C)  Pulse:  [72-86] 83  Resp:  [16-22] 22  SpO2:  [93 %-98 %] 97 %  BP: (120-167)/(60-80) 122/76     Weight: 72.6 kg (160 lb)  Body mass index is 29.26 kg/m².    Intake/Output Summary (Last 24 hours) at 10/25/2018 1033  Last data filed at 10/25/2018 0600  Gross per 24 hour   Intake 800 ml   Output --   Net 800 ml      Physical Exam   Constitutional: She is oriented to person, place, and time. She appears well-developed and well-nourished. No distress.   HENT:   Head: Normocephalic and atraumatic.   Right Ear: External ear normal.   Left Ear: External ear normal.   Nose: Nose normal.   Eyes: Conjunctivae and EOM are normal. Right eye exhibits no discharge. Left eye exhibits no discharge. No scleral icterus.   Neck: Neck supple.   Cardiovascular: Normal rate, regular rhythm and normal heart sounds. Exam reveals no gallop and no friction rub.   No murmur heard.  Pulmonary/Chest: Effort normal and breath sounds normal. No stridor. No  respiratory distress. She has no wheezes. She has no rales.   Abdominal: Soft. Bowel sounds are normal. She exhibits no distension. There is no tenderness. There is no rebound and no guarding.   Musculoskeletal: She exhibits no edema.   Lymphadenopathy:     She has no cervical adenopathy.   Neurological: She is alert and oriented to person, place, and time.   Skin: Skin is warm and dry. She is not diaphoretic.   Psychiatric: She has a normal mood and affect. Her behavior is normal. Thought content normal.   Nursing note and vitals reviewed.      Significant Labs:   BMP:   Recent Labs   Lab 10/25/18  0634   *      K 3.4*   CL 99   CO2 36*   BUN 16   CREATININE 1.0   CALCIUM 9.0   MG 2.1     CBC:   Recent Labs   Lab 10/23/18  2342 10/24/18  0634 10/25/18  0634   WBC 15.88* 17.54* 16.95*   HGB 11.9* 11.4* 11.0*   HCT 39.2 37.5 36.2*    205 192

## 2018-10-25 NOTE — PT/OT/SLP EVAL
Occupational Therapy   Evaluation    Name: Selma Lux MD  MRN: [id number]  Admitting Diagnosis:  Acute on chronic respiratory failure      Recommendations:     Discharge Recommendations: outpatient OT, outpatient PT  Discharge Equipment Recommendations:  none  Barriers to discharge:  None    History:     Occupational Profile:  Living Environment: Pt lives in [facility] with 5 steps to enter and B HR. Her 2 daughters rotate living with her. She has walk in shower with seat available; however, she utilizes deep tub.   Previous level of function: Pt was modified independent with ADL tasks. She occasionally has to use BSC at night. She requires assist for tub transfer. She utilizes straight cane for functional mobility. No falls. Pt's sister and nephew live next door and check in on pt. Pt has  for IADL tasks 4x/week. She no longer drives. 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 She currently attends outpatient therapy.   Roles and Routines: caretaker to self and home, mother, sister, aunt, community dweller  Equipment Used at Home:  cane, straight, shower chair, walker, rolling, wheelchair, bedside commode  Assistance upon Discharge: assist from family     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    Hypertension     IBS (irritable bowel syndrome) 6/21/2017    IGT (impaired glucose tolerance) 1/12/2016    Iron deficiency anemia secondary to inadequate dietary iron intake 6/24/2013    Joint pain     Keratoconjunctivitis sicca of  "both eyes not specified as Sjogren's 7/29/2016    Leiomyoma of uterus, unspecified 9/16/2014    Long QT interval 6/29/2016    Due to medication (plaquenil)     Macular edema 1/12/2015    Multinodular goiter 1/12/2016    Neuropathy 8/23/2017    Plaquenil causing adverse effect in therapeutic use 10/7/2016    Pneumococcal vaccine refused 8/17/2016    Pneumonia due to Streptococcus pneumoniae 4/5/2018    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/2015    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    s/p Right Total knee replacement 5/15/2013    Scoliosis of thoracic spine 5/15/2013    Small vessel disease, cerebrovascular 12/28/2017    Stroke     Vascular dementia 12/6/2017       Past Surgical History:   Procedure Laterality Date    CATARACT EXTRACTION      COLONOSCOPY N/A 9/29/2015    Procedure: COLONOSCOPY;  Surgeon: FIDELINA Sanchez MD;  Location: [facility] (4TH FLR);  Service: Endoscopy;  Laterality: N/A;    COLONOSCOPY N/A 9/29/2015    Performed by FIDELINA Sanchez MD at [facility] (4TH FLR)    EGD (ESOPHAGOGASTRODUODENOSCOPY) N/A 3/11/2014    Performed by Federico Escobar MD at [facility] (4TH FLR)    EGD (ESOPHAGOGASTRODUODENOSCOPY) N/A 11/5/2013    Performed by Federico Escobar MD at [facility] (4TH FLR)    ESOPHAGOGASTRODUODENOSCOPY (EGD) N/A 10/4/2017    Performed by Mg [facility] at [facility]    EYE SURGERY      INJECTION-STEROID-EPIDURAL-TRANSFORAMINAL Right 9/20/2017    [facility]    JOINT REPLACEMENT      right knee    KNEE SURGERY Left 12/31/2013    TKR    left parotidectomy      mixed tumor    SALIVARY GLAND SURGERY      TONSILLECTOMY         Subjective     Chief Complaint: "I thought I'd be walking better than this by now."  Patient/Family Comments/goals: to get better    Pain/Comfort:  · Pain Rating 1: (pain in neck--not rated)    Patients cultural, spiritual, Yarsanism conflicts given the current situation: none reported "     Objective:     Communicated with: RN (Ermelinda) prior to session.  Patient found all lines intact and call button in reach and telemetry, pulse ox (continuous) upon OT entry to room. Eval completed with PT.     General Precautions: Standard, fall   Orthopedic Precautions:N/A   Braces: N/A     Occupational Performance:    Bed Mobility:    · Not assessed due to pt found seated EOB and left up in chair     Functional Mobility/Transfers:  · Patient completed Sit <> Stand Transfer with contact guard assistance  with  straight cane x3 trials (1 from EOB and 2 from toilet)  · Patient completed Toilet Transfer Stand Pivot technique with contact guard assistance with  straight cane  · Functional Mobility: CGA for short household distance with straight cane; SBA with RW for long household distance x2 trials     Activities of Daily Living:  · Grooming: stand by assistance for hand hygiene while standing at sink  · Pt leaned BUE onto counter for balance during task  · Upper Body Dressing: minimum assistance to don gown on front and gown on back like jacket while seated   · Lower Body Dressing: minimum assistance to doff/don brief   · Toileting: contact guard assistance for perineal care after urination in toilet    Cognitive/Visual Perceptual:  Cognitive/Psychosocial Skills:     -       Oriented to: Person, Place, Time and Situation   -       Follows Commands/attention:Follows multistep  commands  -       Communication: clear/fluent  -       Memory: No Deficits noted  -       Safety awareness/insight to disability: intact   -       Mood/Affect/Coping skills/emotional control: Appropriate to situation  Visual/Perceptual:      -Intact     Physical Exam:  Postural examination/scapula alignment:    -       Rounded shoulders  -       Posterior pelvic tilt  Skin integrity: Visible skin intact  Edema:  None noted  Sensation:    -       Intact BUE  Motor Planning:    -       intact   Dominant hand:    -       R  Upper Extremity Range of  Motion:    -       Right Upper Extremity: WFL   -       Left Upper Extremity: WFL    Upper Extremity Strength:   -       Right Upper Extremity: WFL   -       Left Upper Extremity: WFL     Strength:  -       Right Upper Extremity: WFL   -       Left Upper Extremity: WFL    Fine Motor Coordination:    -       Intact  Left hand thumb/finger opposition skills, Right hand thumb/finger opposition skills, Left hand, manipulation of objects and Right hand, manipulation of objects    AMPAC 6 Click ADL:  AMPAC Total Score: 20    Treatment & Education:  -Edu for OT role and POC  -Edu for importance of OOB activity and impact on healing  -Edu for fall prevention and importance of staff assist to bathroom and for t/f at this time  -Whiteboard updated   -Questions and concerns addressed within OT scope of practice   Education:    Patient left up in chair with all lines intact and call button in reach    Assessment:     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  She presents with the following performance deficits affecting function: weakness, impaired endurance, impaired self care skills, impaired functional mobilty, decreased coordination, impaired balance, gait instability, decreased upper extremity function, decreased lower extremity function, impaired cardiopulmonary response to activity, impaired coordination, pain, decreased safety awareness. She demo decreased functional independence in various areas of her life. She demo good motivation and willingness to participate. She demo decreased ADL skills. She demo decreased balance during functional mobility, but this was improved with RW as compared to straight cane. Pt demo decreased endurance to functional task. Pt would continue to benefit from skilled OT services for maximum functional outcome and safety.     Rehab Prognosis: Good; patient would benefit from acute skilled OT services to address these  "deficits and reach maximum level of function.         Clinical Decision Makin.  OT Low:  "Pt evaluation falls under low complexity for evaluation coding due to performance deficits noted in 1-3 areas as stated above and 0 co-morbities affecting current functional status. Data obtained from problem focused assessments. No modifications or assistance was required for completion of evaluation. Only brief occupational profile and history review completed."     Plan:     Patient to be seen 3 x/week to address the above listed problems via self-care/home management, therapeutic activities, therapeutic exercises  · Plan of Care Expires: 18  · Plan of Care Reviewed with: patient    This Plan of care has been discussed with the patient who was involved in its development and understands and is in agreement with the identified goals and treatment plan    GOALS:   Multidisciplinary Problems     Occupational Therapy Goals        Problem: Occupational Therapy Goal    Goal Priority Disciplines Outcome Interventions   Occupational Therapy Goal     OT, PT/OT Ongoing (interventions implemented as appropriate)    Description:  Goals to be met by:   Patient will increase functional independence with ADLs by performing:    UE Dressing with [place] while seated.  LE Dressing with Modified [place].  Grooming while standing at sink with Modified [place].  Toileting from toilet with [place] for hygiene and clothing management.   Toilet transfer to toilet with Modified [place].  Upper extremity exercise program x10 reps per handout, with independence to increase endurance to functional task.  Functional mobility with AD as needed for short household distance with no LOB with modified independence.                       Time Tracking:     OT Date of Treatment: 10/25/18  OT Start Time: 1112  OT Stop Time: 1135  OT Total Time (min): 23 min    Billable Minutes:Evaluation 10  Self Care/Home Management " 13    Penny Morales, LOTR  10/25/2018

## 2018-10-25 NOTE — PLAN OF CARE
Problem: Occupational Therapy Goal  Goal: Occupational Therapy Goal  Goals to be met by: 11/8  Patient will increase functional independence with ADLs by performing:    UE Dressing with [place] while seated.  LE Dressing with Modified [place].  Grooming while standing at sink with Modified [place].  Toileting from toilet with [place] for hygiene and clothing management.   Toilet transfer to toilet with Modified [place].  Upper extremity exercise program x10 reps per handout, with independence to increase endurance to functional task.  Functional mobility with AD as needed for short household distance with no LOB with modified independence.     Outcome: Ongoing (interventions implemented as appropriate)  OT kristine completed. AYAD Burgos 10/25/2018

## 2018-10-25 NOTE — PROGRESS NOTES
"[facility] Hospital Medicine  Progress Note    Patient Name: Selma Rosado MD Jose J  MRN: [id number]  Patient Class: IP- Inpatient   Admission Date: 10/23/2018  Length of Stay: 2 days  Attending Physician: Cliff Prado, *  Primary Care Provider: Bhargav Hirsch MD    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  She was admitted to  on 4/5/2018 to 05/02/2018 (27 days) with bilateral pulmonary infiltrates, presumptively treated as PNA (please refer to their extensive discharge summary for details).  She eventually followed up with Pulmonary, Dr. Francis on 10/17/18, who noted, "I was worried that Dr. Lux had recurrence/relapse of acute hypoxemic respiratory failure with bilateral CXR infiltrates in the setting of her steroid taper.  This seemed very similar to her illness in Spring 2018 that resulted in prolonged ICU/hospital stay without a definitive diagnosis proven.  Last week we decided to bump her prednisone dose up to 30 mg daily, in addition to increasing her diuretic and completing a course of levofloxacin.  She only took a day of the increased diuretic dose due to severe leg cramps.  As a result, she has been taking 40 mg daily of furosemide.  She noticed significant improvement after ~5 days of treatment as outlined above.  She and her friends noted that she is doing "better", although she still gets a little shortwinded during conversation."     She had a follow up CXR done which revealed continued bilateral infiltrates, so she was referred to ID clinic.  She came for her appt with ID today and her O2 sats were found to be 80% with any exertion (not hypoxic sitting).  Dr. Ocasio feels bacterial PNA is unlikely at this point, so does not necessarily recommend " empiric therapy with antibiotics.  She does have some bilateral lower leg swelling, chonic.          Hospital Course:  Patient was admitted to [facility] for evaluation of acute on chronic respiratory failure. She was continued on vanc, zosyn, and azithromycin. She was also started on Lasix 40mg BID due to concern that her acute respiratory failure was due to underlying CHF. Pulmonology consulted for possible bronchoscopy and ID consulted recommendation for antibiotic coverage. ID recommended continued antibiotic coverage due to history of patient improving on a previous hospitalization with steroids and antibiotics for similar presentation.Pulmonary recommended continued prednisone 40mg and bronchoscopy. They believed that the patient's acute condition was most likely due to steroid responding lung disease and was less likely due to an infectious etiology. The patient declined bronchoscopy. Echo showed EF 60-65% and Lasix was discontinued due to low likelihood that respiratory failure was due to cardiogenic etiology.    Interval History:  NAEON. She stated that she is only SOB on exertion. She denies chest pain, abdominal pain, nausea, and  vomiting.    Review of Systems   Constitutional: Negative for chills, fatigue and fever.   HENT: Negative for hearing loss and sore throat.    Eyes: Negative for visual disturbance.   Respiratory: Negative for cough.         Positive for SOB with exertion   Cardiovascular: Negative for chest pain.   Gastrointestinal: Negative for abdominal pain, constipation, diarrhea, nausea and vomiting.   Genitourinary: Negative for dysuria and frequency.   Musculoskeletal: Negative for arthralgias and myalgias.   Skin: Negative for rash.   Neurological: Negative for dizziness, weakness and headaches.     Objective:     Vital Signs (Most Recent):  Temp: 98 °F (36.7 °C) (10/25/18 0835)  Pulse: 83 (10/25/18 0835)  Resp: (!) 22 (10/25/18 0835)  BP: 122/76 (10/25/18 0835)  SpO2: 97 % (10/25/18 0835)  Vital Signs (24h Range):  Temp:  [97.5 °F (36.4 °C)-98.7 °F (37.1 °C)] 98 °F (36.7 °C)  Pulse:  [72-86] 83  Resp:  [16-22] 22  SpO2:  [93 %-98 %] 97 %  BP: (120-167)/(60-80) 122/76     Weight: 72.6 kg (160 lb)  Body mass index is 29.26 kg/m².    Intake/Output Summary (Last 24 hours) at 10/25/2018 1033  Last data filed at 10/25/2018 0600  Gross per 24 hour   Intake 800 ml   Output --   Net 800 ml      Physical Exam   Constitutional: She is oriented to person, place, and time. She appears well-developed and well-nourished. No distress.   HENT:   Head: Normocephalic and atraumatic.   Right Ear: External ear normal.   Left Ear: External ear normal.   Nose: Nose normal.   Eyes: Conjunctivae and EOM are normal. Right eye exhibits no discharge. Left eye exhibits no discharge. No scleral icterus.   Neck: Neck supple.   Cardiovascular: Normal rate, regular rhythm and normal heart sounds. Exam reveals no gallop and no friction rub.   No murmur heard.  Pulmonary/Chest: Effort normal and breath sounds normal. No stridor. No respiratory distress. She has no wheezes. She has no rales.   Abdominal: Soft. Bowel sounds are normal. She exhibits no distension. There is no tenderness. There is no rebound and no guarding.   Musculoskeletal: She exhibits no edema.   Lymphadenopathy:     She has no cervical adenopathy.   Neurological: She is alert and oriented to person, place, and time.   Skin: Skin is warm and dry. She is not diaphoretic.   Psychiatric: She has a normal mood and affect. Her behavior is normal. Thought content normal.   Nursing note and vitals reviewed.      Significant Labs:   BMP:   Recent Labs   Lab 10/25/18  0634   *      K 3.4*   CL 99   CO2 36*   BUN 16   CREATININE 1.0   CALCIUM 9.0   MG 2.1     CBC:   Recent Labs   Lab 10/23/18  2342 10/24/18  0634 10/25/18  0634   WBC 15.88* 17.54* 16.95*   HGB 11.9* 11.4* 11.0*   HCT 39.2 37.5 36.2*    205 192           Assessment/Plan:      * Acute on  chronic respiratory failure    -Pulmonology consulted for bronchoscope but patient declining at this time  -Infectious Disease consulted and recommended coverage for HCAP with vanco, unasyn,and azithromycin  -Pulmonology believes condition most likely due steroid responsive disease   -Low suspicion of acute deterioration due to aspiration PNA but will continue to cover empirical antiboitic coverage; Low concern etiology due for CHF due to echo w/ EF 60-65% and noted normal atrial pressure    -Will switch lasix from IV to home oral dose of Lasix 40mg BID  -Will continuing vanco, unasyn,and azithromycin  -continue Prednisone 40mg daily           Hypomagnesemia    - Continue home mag supplementation  - Monitor daily       Neck pain    - Continue gabapentin, baclofen       Vascular dementia    - Continue ASA and plavix       Hypertension, essential    Home Medications: Carvedilol 12.5 mg BID and Furosemide 40mg BID   - Continue home medication       Seropositive rheumatoid arthritis of multiple sites    - Continue plaquenil          VTE Risk Mitigation (From admission, onward)        Ordered     enoxaparin injection 40 mg  Daily      10/23/18 2258     IP VTE HIGH RISK PATIENT  Once      10/23/18 2258              Sadia Ness MD  Department of Hospital Medicine   [facility]

## 2018-10-25 NOTE — PT/OT/SLP EVAL
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  She presents with the following impairments/functional limitations:  gait instability, weakness, impaired endurance, impaired balance, impaired sensation, impaired functional mobilty. Pt performed transfers CGA/SBA and amb ~15ft to bathroom CGA with RW, ~15ft to EOB CGA with RW, amb ~75ft SBA with RW, vc's for AD management. Pt will benefit from skilled PT to improve deficits and increase overall functional mobility.     Rehab Prognosis:  Good; patient would benefit from acute skilled PT services to address these deficits and reach maximum level of function.      Recent Surgery: * No surgery found *      Plan:     During this hospitalization, patient to be seen 3 x/week to address the above listed problems via gait training, therapeutic activities, therapeutic exercises  · Plan of Care Expires:  11/25/18   Plan of Care Reviewed with: patient    Subjective     Communicated with RN prior to session.  Patient found seated EOB upon PT entry to room, agreeable to evaluation.      Chief Complaint: neck pain  Patient comments/goals: return home  Pain/Comfort:  · Pain Rating 1: other (see comments)(pt did not rate pain)  · Location - Side 1: Bilateral  · Location - Orientation 1: generalized  · Location 1: neck(baseline torticollis per pt report)  · Pain Addressed 1: Reposition, Distraction  · Pain Rating Post-Intervention 1: other (see comments)(pt did not rate)    Patients cultural, spiritual, Rastafari conflicts given the current situation:      Living Environment:  Pt lives alone in 1-story house with 5 GLORIA with B handrails and walk-in shower.  Pt reports amb with SC and mod (I) with ADLs. Pt reports her 2 daughters have been staying home with her and her sister lives next door. Pt reports CNA comes to the house 3-4 days to assist with housework and assist with bathing.   Prior to admission, patients level of function was mod (I).  Patient has the following equipment: shower chair, cane, straight, walker, rolling, wheelchair, bedside commode.  DME owned (not currently used): none.  Upon discharge, patient will have assistance from family.    Objective:     Patient found with: telemetry     General Precautions: Standard, fall   Orthopedic Precautions:N/A   Braces: N/A     Exams:  · Cognitive Exam:  Patient is oriented to Person, Place, Time and Situation  · Gross Motor Coordination:  WFL  · Postural Exam:  Patient presented with the following abnormalities:    · -       Rounded shoulders  · Sensation:    · -       Intact  light/touch B LE  · Skin Integrity/Edema:      · -       Skin integrity: Visible skin intact  · RLE ROM: WFL  · RLE Strength: WFL  · LLE ROM: WFL  · LLE Strength: WFL    Functional Mobility:  Transfers:     · Sit to Stand:  contact guard assistance with straight cane, SBA with RW  · Toilet Transfer: contact guard assistance with  straight cane  using  Stand Pivot    Gait:   ~15ft to bathroom CGA with RW   ~15ft to EOB CGA with RW  ~75ft SBA with RW, vc's for AD management    AM-PAC 6 CLICK MOBILITY  Total Score:19       Therapeutic Activities and Exercises:  Pt sat EOB with S.  Pt educated on:  -role of PT/POC  -importance of OOB activity  -safety with mobility  -use of RW for safety  -benefits of OP PT  Pt safe to amb with RW with RN staff.     Patient left up in chair with all lines intact, call button in reach and RN notified.    GOALS:   Multidisciplinary Problems     Physical Therapy Goals        Problem: Physical Therapy Goal    Goal Priority Disciplines Outcome Goal Variances Interventions   Physical Therapy Goal     PT, PT/OT  Ongoing (interventions implemented as appropriate)     Description:  Goals to be met by: 2018     Patient will increase functional independence with mobility by performin. Supine to sit with Modified [place]  2. Sit to supine with Modified [place]  3. Sit to stand transfer with Supervision  4. Gait  x 200 feet with Supervision using appropriate AD.   5. Ascend/descend 5 stair with bilateral Handrails Contact Guard Assistance.   6. Lower extremity exercise program x15 reps per handout, with supervision                      History:     Past Medical History:   Diagnosis Date    Anemia     Arthritis     Bilateral hand pain 3/14/2018    Branch retinal vein occlusion, left eye 2015    Chronic bilateral low back pain without sciatica 3/23/2017    Cognitive communication deficit 2017    Cystoid macular edema, left eye 2015    Cystoid macular edema, left eye 2015    DJD (degenerative joint disease) of cervical spine 5/15/2013    Fatty liver 2014    Goiter 2018    Hashimoto's disease     Hemichorea 2017    Hypertension     IBS (irritable bowel syndrome) 2017    IGT (impaired glucose tolerance) 2016    Iron deficiency anemia secondary to inadequate dietary iron intake 2013    Joint pain     Keratoconjunctivitis sicca of both eyes not specified as Sjogren's 2016    Leiomyoma of uterus, unspecified 2014    Long QT interval 2016    Due to medication (plaquenil)     Macular edema 2015    Multinodular goiter 2016    Neuropathy 2017    Plaquenil causing adverse effect in therapeutic use 10/7/2016    Pneumococcal vaccine refused 2016    Pneumonia due to Streptococcus pneumoniae 2018    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/2015    Retinal macroaneurysm of left eye 2015    s/p Right Total knee replacement 5/15/2013    Scoliosis of thoracic spine 5/15/2013    Small vessel  disease, cerebrovascular 12/28/2017    Stroke     Vascular dementia 12/6/2017       Past Surgical History:   Procedure Laterality Date    CATARACT EXTRACTION      COLONOSCOPY N/A 9/29/2015    Procedure: COLONOSCOPY;  Surgeon: FIDELINA Sanchez MD;  Location: [facility] (4TH FLR);  Service: Endoscopy;  Laterality: N/A;    COLONOSCOPY N/A 9/29/2015    Performed by FIDELINA Sanchez MD at [facility] (4TH FLR)    EGD (ESOPHAGOGASTRODUODENOSCOPY) N/A 3/11/2014    Performed by Federico Escobar MD at [facility] (4TH FLR)    EGD (ESOPHAGOGASTRODUODENOSCOPY) N/A 11/5/2013    Performed by Federico Escobar MD at [facility] (4TH FLR)    ESOPHAGOGASTRODUODENOSCOPY (EGD) N/A 10/4/2017    Performed by Mg Morton MD at [facility] ENDO    EYE SURGERY      INJECTION-STEROID-EPIDURAL-TRANSFORAMINAL Right 9/20/2017    [facility]dez [facility] PAIN MGT    JOINT REPLACEMENT      right knee    KNEE SURGERY Left 12/31/2013    TKR    left parotidectomy      mixed tumor    SALIVARY GLAND SURGERY      TONSILLECTOMY         Clinical Decision Making:     Decision Making/ Complexity Score   On examination of body system using standardized tests and measures patient presents with 1-2 elements from any of the following: body structures and functions, activity limitations, and/or participation restrictions.  Leading to a clinical presentation that is considered stable and/or uncomplicated                              Clinical Decision Making  (Eval Complexity):  Low- 22105     Time Tracking:     PT Received On: 10/25/18  PT Start Time: 1112     PT Stop Time: 1135  PT Total Time (min): 23 min     Billable Minutes: Evaluation 23      ROOPA RAPP, PT  10/25/2018

## 2018-10-25 NOTE — PLAN OF CARE
Problem: Physical Therapy Goal  Goal: Physical Therapy Goal  Goals to be met by: 2018     Patient will increase functional independence with mobility by performin. Supine to sit with Modified [place]  2. Sit to supine with Modified [place]  3. Sit to stand transfer with Supervision  4. Gait  x 200 feet with Supervision using appropriate AD.   5. Ascend/descend 5 stair with bilateral Handrails Contact Guard Assistance.   6. Lower extremity exercise program x15 reps per handout, with supervision    Outcome: Ongoing (interventions implemented as appropriate)  Pt evaluation complete; pt goals set.    ROOPA RAPP, PT  10/25/2018

## 2018-10-25 NOTE — ASSESSMENT & PLAN NOTE
Impression:  1.  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  2.  Chronic immunosuppresion.     Plan:  1.  Will follow patient's wishes and not schedule a bronchoscopy as she states that the only anurag in her life is singing and her voice has just recovered from previous prolonged intubation and hospitalization for a similar process that was steroid responsive.  Although superimposed infection is on the differential, I feel that it is less likely.  Patient will agree to induced sputum for respiratory cultures to include AFB, routine and fungal.    2.  Continue prednisone 40mg daily  3. Ok to de-escalate/stop antibiotics, as this is most likely not an infectious process.  4. Pt is existing pt in pulm clinic, and can f/u after discharge.

## 2018-10-25 NOTE — PLAN OF CARE
Problem: Patient Care Overview  Goal: Plan of Care Review  Outcome: Ongoing (interventions implemented as appropriate)  Pt is free from falls trauma and injuries. Bed in low position with call light in reach. Skin is warm and dry. VS are stable. No noted fever or pain.

## 2018-10-26 VITALS
BODY MASS INDEX: 29.44 KG/M2 | SYSTOLIC BLOOD PRESSURE: 164 MMHG | WEIGHT: 160 LBS | DIASTOLIC BLOOD PRESSURE: 77 MMHG | TEMPERATURE: 98 F | HEART RATE: 72 BPM | RESPIRATION RATE: 16 BRPM | OXYGEN SATURATION: 93 % | HEIGHT: 62 IN

## 2018-10-26 LAB
1,3 BETA GLUCAN SPEC-MCNC: <31 PG/ML
ALBUMIN SERPL BCP-MCNC: 2.8 G/DL
ALP SERPL-CCNC: 50 U/L
ALT SERPL W/O P-5'-P-CCNC: 7 U/L
ANION GAP SERPL CALC-SCNC: 10 MMOL/L
ANISOCYTOSIS BLD QL SMEAR: SLIGHT
AST SERPL-CCNC: 6 U/L
BASOPHILS # BLD AUTO: 0.03 K/UL
BASOPHILS NFR BLD: 0.2 %
BILIRUB SERPL-MCNC: 1 MG/DL
BUN SERPL-MCNC: 17 MG/DL
BURR CELLS BLD QL SMEAR: ABNORMAL
CALCIUM SERPL-MCNC: 9.1 MG/DL
CHLORIDE SERPL-SCNC: 103 MMOL/L
CO2 SERPL-SCNC: 30 MMOL/L
CREAT SERPL-MCNC: 1.1 MG/DL
DIFFERENTIAL METHOD: ABNORMAL
ENTEROVIRUS: NOT DETECTED
EOSINOPHIL # BLD AUTO: 0.2 K/UL
EOSINOPHIL NFR BLD: 0.9 %
ERYTHROCYTE [DISTWIDTH] IN BLOOD BY AUTOMATED COUNT: 14.5 %
EST. GFR  (AFRICAN AMERICAN): 54.4 ML/MIN/1.73 M^2
EST. GFR  (NON AFRICAN AMERICAN): 47.2 ML/MIN/1.73 M^2
GALACTOMANNAN AG SERPL IA-ACNC: <0.5 INDEX
GLUCOSE SERPL-MCNC: 112 MG/DL
HCT VFR BLD AUTO: 39.4 %
HGB BLD-MCNC: 11.9 G/DL
HISTOPLASMA AG VALUE: 0 NG/ML
HISTOPLASMA ANTIGEN URINE: NEGATIVE
HUMAN BOCAVIRUS: NOT DETECTED
HUMAN CORONAVIRUS, COMMON COLD VIRUS: NOT DETECTED
HYPOCHROMIA BLD QL SMEAR: ABNORMAL
IMM GRANULOCYTES # BLD AUTO: 0.16 K/UL
IMM GRANULOCYTES NFR BLD AUTO: 0.9 %
INFLUENZA A - H1N1-09: NOT DETECTED
LYMPHOCYTES # BLD AUTO: 2.3 K/UL
LYMPHOCYTES NFR BLD: 12.5 %
MAGNESIUM SERPL-MCNC: 2 MG/DL
MCH RBC QN AUTO: 27.6 PG
MCHC RBC AUTO-ENTMCNC: 30.2 G/DL
MCV RBC AUTO: 91 FL
MONOCYTES # BLD AUTO: 3.3 K/UL
MONOCYTES NFR BLD: 18.2 %
NEUTROPHILS # BLD AUTO: 12.3 K/UL
NEUTROPHILS NFR BLD: 67.3 %
NRBC BLD-RTO: 0 /100 WBC
PARAINFLUENZA: NOT DETECTED
PHOSPHATE SERPL-MCNC: 3.5 MG/DL
PLATELET # BLD AUTO: 185 K/UL
PLATELET BLD QL SMEAR: ABNORMAL
PMV BLD AUTO: 10.3 FL
POIKILOCYTOSIS BLD QL SMEAR: SLIGHT
POTASSIUM SERPL-SCNC: 3.7 MMOL/L
PROT SERPL-MCNC: 5.9 G/DL
RBC # BLD AUTO: 4.31 M/UL
RVP - ADENOVIRUS: NOT DETECTED
RVP - HUMAN METAPNEUMOVIRUS (HMPV): NOT DETECTED
RVP - INFLUENZA A: NOT DETECTED
RVP - INFLUENZA B: NOT DETECTED
RVP - RESPIRATORY SYNCTIAL VIRUS (RSV) A: NOT DETECTED
RVP - RESPIRATORY VIRAL PANEL, SOURCE: NORMAL
RVP - RHINOVIRUS: NOT DETECTED
SODIUM SERPL-SCNC: 143 MMOL/L
WBC # BLD AUTO: 18.22 K/UL

## 2018-10-26 PROCEDURE — 83735 ASSAY OF MAGNESIUM: CPT

## 2018-10-26 PROCEDURE — 84100 ASSAY OF PHOSPHORUS: CPT

## 2018-10-26 PROCEDURE — 63600175 PHARM REV CODE 636 W HCPCS: Performed by: HOSPITALIST

## 2018-10-26 PROCEDURE — 25000003 PHARM REV CODE 250

## 2018-10-26 PROCEDURE — 36415 COLL VENOUS BLD VENIPUNCTURE: CPT

## 2018-10-26 PROCEDURE — 25000003 PHARM REV CODE 250: Performed by: STUDENT IN AN ORGANIZED HEALTH CARE EDUCATION/TRAINING PROGRAM

## 2018-10-26 PROCEDURE — 25000003 PHARM REV CODE 250: Performed by: HOSPITALIST

## 2018-10-26 PROCEDURE — 85025 COMPLETE CBC W/AUTO DIFF WBC: CPT

## 2018-10-26 PROCEDURE — 80053 COMPREHEN METABOLIC PANEL: CPT

## 2018-10-26 PROCEDURE — 99239 HOSP IP/OBS DSCHRG MGMT >30: CPT | Mod: GC,,, | Performed by: HOSPITALIST

## 2018-10-26 RX ORDER — PREDNISONE 20 MG/1
40 TABLET ORAL DAILY
Qty: 60 TABLET | Refills: 0 | Status: SHIPPED | OUTPATIENT
Start: 2018-10-27 | End: 2018-11-26

## 2018-10-26 RX ORDER — SULFAMETHOXAZOLE AND TRIMETHOPRIM 800; 160 MG/1; MG/1
1 TABLET ORAL DAILY
Qty: 30 TABLET | Refills: 0 | Status: ON HOLD | OUTPATIENT
Start: 2018-10-26 | End: 2018-12-28

## 2018-10-26 RX ADMIN — HYDROXYCHLOROQUINE SULFATE 400 MG: 200 TABLET, FILM COATED ORAL at 09:10

## 2018-10-26 RX ADMIN — AZITHROMYCIN MONOHYDRATE 250 MG: 250 TABLET ORAL at 09:10

## 2018-10-26 RX ADMIN — CLOPIDOGREL 75 MG: 75 TABLET, FILM COATED ORAL at 09:10

## 2018-10-26 RX ADMIN — PREDNISONE 40 MG: 20 TABLET ORAL at 09:10

## 2018-10-26 RX ADMIN — PANTOPRAZOLE SODIUM 20 MG: 20 TABLET, DELAYED RELEASE ORAL at 09:10

## 2018-10-26 RX ADMIN — BACLOFEN 10 MG: 10 TABLET ORAL at 09:10

## 2018-10-26 RX ADMIN — FUROSEMIDE 40 MG: 40 TABLET ORAL at 09:10

## 2018-10-26 RX ADMIN — CARVEDILOL 12.5 MG: 12.5 TABLET, FILM COATED ORAL at 09:10

## 2018-10-26 RX ADMIN — ASPIRIN 81 MG: 81 TABLET, COATED ORAL at 09:10

## 2018-10-26 RX ADMIN — Medication 100 MG: at 09:10

## 2018-10-26 RX ADMIN — PIPERACILLIN AND TAZOBACTAM 4.5 G: 4; .5 INJECTION, POWDER, LYOPHILIZED, FOR SOLUTION INTRAVENOUS; PARENTERAL at 05:10

## 2018-10-26 RX ADMIN — MAGNESIUM OXIDE TAB 400 MG (241.3 MG ELEMENTAL MG) 400 MG: 400 (241.3 MG) TAB at 09:10

## 2018-10-26 RX ADMIN — GABAPENTIN 300 MG: 300 CAPSULE ORAL at 09:10

## 2018-10-26 NOTE — PROGRESS NOTES
[facility]  Infectious Disease  Progress Note    Patient Name: Selma Alonzo Lux MD  MRN: [id number]  Admission Date: 10/23/2018  Length of Stay: 2 days  Attending Physician: Cliff Prado, *  Primary Care Provider: Bhargav Hirsch MD    Isolation Status: No active isolations  Assessment/Plan:      * Acute on chronic respiratory failure    82yo woman w/a history of prior CVA, pAF, RA (dx 2012; RF seropositive, erosive; on maintenance HCQ/prednisone 5mg and s/p humira 3/22/2018 c/b multifocal HCAP s/p empiric vanc/zosyn/azithro course with associated hypoxic RF requiring ICU admission/prolonged rehab stay in 4/5/2018 to 5/2/2018; undergoing prolonged steroid wean with recent escalation to prednisone 30mg daily + LVQ course on 10/10 for presumptive pulmonary manifestation of rheumatologic disease vs CAP), IBS, and [place] thyroiditis (goiter) who was admitted on 10/23/2018 for further evaluation of 3wks of progressive malaise, BEJARANO, and hypoxia (O2 80% in ID clinic) with recurrent bilateral patchy peripheral consolidations with air bronchograms (similar to 4/2018 CT chest but had resolved in interim), concerning for multifocal pneumonia (HCAP with bacterial pathogens not excluded as steroids could be suppressing inflammatory response; IFI, PCP, and other OI's remain possible given timecourse) vs inflammatory pneumonitis (including pulmonary disease associated with RA, , hypersensitivity pneumonitis among others; of note, volume overload as a primary  of her symptoms seems less likely given lack of improvement to diuresis trial and heterogeneous pattern of infiltrates). She is stable on room air at present currently despite these symptoms and without improvement in BEJARANO with broad spectrum antibiotics/diuresis to date.    - patient has refused bronchoscopy for tissue biopsy/culture at this time to clarify diagnosis further but is amenable to trial of antimicrobial agents to  assess for clinical improvement while awaiting pending noninvasive studies (since she improved on a trial of antibiotics and steroids on 4/2018 admission)  - would continue empiric vanc, zosyn, and azithro to cover for HCAP pathogens for another day but without improvement on trial would stop  - have sent aspergillus antigen, fungitell, urine histo/blasto, cocci ab, crypto ag, and RVP along with sputum cx  - steroids per pulmonary team given concern of   - pulmonary following -- will readdress possible bronchoscopy for tissue biopsy to clarify diagnosis if she fails to improve with this management strategy; if stable tomorrow on slightly increased dose of steroids, can discharge with follow-up in pulmonary clinic         Anticipated Disposition: pending improvement    Thank you for your consult. I will follow-up with patient. Please contact us if you have any additional questions.     Carol Tan MD  Transplant ID Attending  [phone]    Carol Tan MD  Infectious Disease  [facility]    Subjective:     Principal Problem:Acute on chronic respiratory failure    HPI: No notes on file  Interval History: No change in respiratory symptoms today on vanc/zosyn/azithro and after aggressive diuresis. Afebrile.     Review of Systems   Constitutional: Positive for activity change. Negative for fever.   HENT: Negative for congestion.    Respiratory: Positive for shortness of breath. Negative for cough.    Cardiovascular: Negative for chest pain.   Gastrointestinal: Negative for nausea.   Genitourinary: Negative for dysuria.   Musculoskeletal: Negative for back pain.   Skin: Negative for wound.   Neurological: Negative for headaches.   Psychiatric/Behavioral: Negative for dysphoric mood.     Objective:     Vital Signs (Most Recent):  Temp: 97 °F (36.1 °C) (10/25/18 1536)  Pulse: 91 (10/25/18 1840)  Resp: 12 (10/25/18 1840)  BP: 121/75 (10/25/18 1840)  SpO2: 98 % (10/25/18 1840) Vital Signs (24h  Range):  Temp:  [97 °F (36.1 °C)-98 °F (36.7 °C)] 97 °F (36.1 °C)  Pulse:  [72-91] 91  Resp:  [12-22] 12  SpO2:  [93 %-98 %] 98 %  BP: (120-130)/(74-80) 121/75     Weight: 72.6 kg (160 lb)  Body mass index is 29.26 kg/m².    Estimated Creatinine Clearance: 41.2 mL/min (based on SCr of 1 mg/dL).    Physical Exam   Constitutional: She is oriented to person, place, and time. She appears well-developed and well-nourished. No distress.   HENT:   Head: Normocephalic and atraumatic.   Eyes: Conjunctivae are normal. No scleral icterus.   Neck: Normal range of motion.   Cardiovascular: Normal rate, regular rhythm, normal heart sounds and intact distal pulses.   Pulmonary/Chest: Effort normal and breath sounds normal.   Abdominal: Soft. She exhibits no distension. There is no tenderness.   Musculoskeletal: Normal range of motion. She exhibits edema (2+ bilateral LE).   Chronic changes of arthropathy affecting hands but no obvious active synovitis.   Neurological: She is alert and oriented to person, place, and time.   Skin: Skin is warm and dry.   Psychiatric: She has a normal mood and affect. Her behavior is normal.       Significant Labs:   CBC:   Recent Labs   Lab 10/23/18  2342 10/24/18  0634 10/25/18  0634   WBC 15.88* 17.54* 16.95*   HGB 11.9* 11.4* 11.0*   HCT 39.2 37.5 36.2*    205 192     CMP:   Recent Labs   Lab 10/23/18  2342 10/24/18  0634 10/25/18  0634    140 140   K 4.1 3.5 3.4*   CL 98 100 99   CO2 33* 29 36*   * 236* 112*   BUN 19 16 16   CREATININE 1.1 0.8 1.0   CALCIUM 9.2 8.7 9.0   PROT 6.5 5.6* 5.7*   ALBUMIN 3.0* 2.6* 2.7*   BILITOT 0.5 0.5 0.6   ALKPHOS 62 59 52*   AST 7* 7* 7*   ALT 8* 7* 6*   ANIONGAP 8 11 5*   EGFRNONAA 47.2* >60.0 53.0*       Significant Imaging: I have reviewed all pertinent imaging results/findings within the past 24 hours.

## 2018-10-26 NOTE — DISCHARGE SUMMARY
"[facility] Hospital Medicine  Discharge Summary      Patient Name: Selma Rosado MD Jose J  MRN: [id number]  Admission Date: 10/23/2018  Hospital Length of Stay: 3 days  Discharge Date and Time:  10/26/2018 10:45 AM  Attending Physician: Cliff Prado, *   Discharging Provider: Sadia Ness MD  Primary Care Provider: Bhargav Hirsch MD  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  She was admitted to  on 4/5/2018 to 05/02/2018 (27 days) with bilateral pulmonary infiltrates, presumptively treated as PNA (please refer to their extensive discharge summary for details).  She eventually followed up with Pulmonary, Dr. Francis on 10/17/18, who noted, "I was worried that Dr. Lux had recurrence/relapse of acute hypoxemic respiratory failure with bilateral CXR infiltrates in the setting of her steroid taper.  This seemed very similar to her illness in Spring 2018 that resulted in prolonged ICU/hospital stay without a definitive diagnosis proven.  Last week we decided to bump her prednisone dose up to 30 mg daily, in addition to increasing her diuretic and completing a course of levofloxacin.  She only took a day of the increased diuretic dose due to severe leg cramps.  As a result, she has been taking 40 mg daily of furosemide.  She noticed significant improvement after ~5 days of treatment as outlined above.  She and her friends noted that she is doing "better", although she still gets a little shortwinded during conversation."     She had a follow up CXR done which revealed continued bilateral infiltrates, so she was referred to ID clinic.  She came for her appt with ID today and her O2 sats were found to be 80% with any exertion (not hypoxic sitting).  Dr. Ocasio feels bacterial PNA is unlikely at this point, so does not necessarily recommend " empiric therapy with antibiotics.  She does have some bilateral lower leg swelling, chonic.          * No surgery found *      Hospital Course:   Patient was admitted to 4 for evaluation of acute on chronic respiratory failure. She was continued on vanc, zosyn, and azithromycin. She was also started on Lasix 40mg BID due to concern that her acute respiratory failure was due to underlying CHF. Pulmonology consulted and recommended continued Prednisone 40mg daily and bronchoscopy. They believed that the patient's acute condition was most likely due to steroid responding lung disease such as Cryptogenic Organizing Pneumonia ().  ID was also consulted and recommended continued antibiotic coverage for possible component of infectious cause. The patient declined bronchoscopy. Lasix was discontinued and patient was switched in home dose of PO lasix. It was determined that the patient's condition was more consistent with  and less likely an infectious process. She was discharged in stable condition on Prednisone 40 mg daily and bactrim for pneumocystis prophylaxis with close pulmonology follow-up.       ROS:  Constitutional: Negative for chills, fatigue and fever.   HENT: Negative for hearing loss and sore throat.    Eyes: Negative for visual disturbance.   Respiratory: Negative for cough.         Positive for SOB with exertion   Cardiovascular: Negative for chest pain.   Gastrointestinal: Negative for abdominal pain, constipation, diarrhea, nausea and vomiting.   Genitourinary: Negative for dysuria and frequency.   Musculoskeletal: Negative for arthralgias and myalgias.   Skin: Negative for rash.   Neurological: Negative for dizziness, weakness and headaches.     Vitals:    10/26/18 0827   BP:  164/77   Pulse: 72   Resp: 16   Temp: 97.5 °F (36.4 °C)     Physical Exam   Constitutional: She is oriented to person, place, and time. She appears well-developed and well-nourished. No distress.   HENT:   Head:  Normocephalic and atraumatic.   Right Ear: External ear normal.   Left Ear: External ear normal.   Nose: Nose normal.   Eyes: Conjunctivae and EOM are normal. Right eye exhibits no discharge. Left eye exhibits no discharge. No scleral icterus.   Neck: Neck supple.   Cardiovascular: Normal rate, regular rhythm and normal heart sounds. Exam reveals no gallop and no friction rub.   No murmur heard.  Pulmonary/Chest: Effort normal and breath sounds normal. No stridor. No respiratory distress. She has no wheezes. She has no rales.   Abdominal: Soft. Bowel sounds are normal. She exhibits no distension. There is no tenderness. There is no rebound and no guarding.   Musculoskeletal: She exhibits no edema.   Lymphadenopathy:     She has no cervical adenopathy.   Neurological: She is alert and oriented to person, place, and time.   Skin: Skin is warm and dry. She is not diaphoretic.   Psychiatric: She has a normal mood and affect. Her behavior is normal. Thought content normal.   Nursing note and vitals reviewed.      Consults:   Consults (From admission, onward)        Status Ordering Provider     Inpatient consult to Infectious Diseases  Once     Provider:  (Not yet assigned)    Completed JENNIFER HERRON     Inpatient consult to Pulmonology  Once     Provider:  (Not yet assigned)    Completed JENNIFER HERRON          * Acute on chronic respiratory failure    -Pulmonology consulted for bronchoscope but patient declining at this time  -Infectious Disease consulted and recommended coverage for HCAP with vanco, unasyn,and azithromycin  -Pulmonology believes condition most likely due steroid responsive disease   -Low suspicion of acute deterioration due to aspiration PNA but will continue to cover empirical antiboitic coverage; Low concern etiology due for CHF due to echo w/ EF 60-65% and noted normal atrial pressure    -Will discontinue vanco, unasyn,and azithromycin on discharge  -continue Prednisone 40mg daily and bactrim for  pneumocystsis     Neck pain    - Continue gabapentin, baclofen       Vascular dementia    - Continue ASA and plavix       Hypertension, essential    Home Medications: Carvedilol 12.5 mg BID and Furosemide 40mg BID   - Continue home medication       Seropositive rheumatoid arthritis of multiple sites    - Continue plaquenil          Final Active Diagnoses:    Diagnosis Date Noted POA    PRINCIPAL PROBLEM:  Acute on chronic respiratory failure [J96.20] 10/23/2018 Unknown    Hypomagnesemia [E83.42] 10/24/2018 Unknown    Neck pain [M54.2] 03/02/2018 Unknown    Vascular dementia [F01.50] 12/06/2017 Yes    Hypertension, essential [I10] 06/16/2016 Yes    Seropositive rheumatoid arthritis of multiple sites [M05.79] 10/21/2015 Yes      Problems Resolved During this Admission:    Diagnosis Date Noted Date Resolved POA    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    Specialty:  Pulmonary Disease  Contact information:  [address] [place] [id number] [phone]                 Patient Instructions:      Ambulatory referral to Outpatient Case Management   Referral Priority: Routine Referral Type: Consultation   Referral Reason: Specialty Services Required   Number of Visits Requested: 1       Significant Diagnostic Studies: Labs:   BMP:   Recent Labs   Lab 10/25/18  0634 10/26/18  0722   * 112*    143   K 3.4* 3.7   CL 99 103   CO2 36* 30*   BUN 16 17   CREATININE 1.0 1.1   CALCIUM 9.0 9.1   MG 2.1 2.0    and CBC   Recent Labs   Lab 10/25/18  0634 10/26/18  0722   WBC 16.95* 18.22*   HGB 11.0* 11.9*   HCT 36.2* 39.4    185       Pending Diagnostic Studies:     Procedure Component Value Units Date/Time    Aspergillus antigen [[id number]] Collected:  10/25/18 0634    Order Status:  Sent Lab Status:  In process Updated:  10/25/18 0731    Specimen:  Blood      Fungal Immunodiffusion - Blood [[id number]] Collected:  10/25/18 0634    Order Status:  Sent Lab Status:  In process Updated:  10/25/18 0731    Specimen:  Blood     Fungitell Assay For (1.3)-B-D-Glucans [[id number]] Collected:  10/23/18 2342    Order Status:  Sent Lab Status:  In process Updated:  10/24/18 0015    Specimen:  Blood     Histoplasma antigen, urine [[id number]] Collected:  10/25/18 1140    Order Status:  Sent Lab Status:  In process Updated:  10/25/18 1206    Specimen:  Urine, Clean Catch          Medications:  Reconciled Home Medications:      Medication List      START taking these medications    sulfamethoxazole-trimethoprim 800-160mg 800-160 mg Tab  Commonly known as:  BACTRIM DS  Take 1 tablet by mouth once daily.        CHANGE how you take these medications    pantoprazole 40 mg Grps  Commonly known as:  PROTONIX  1 packet (40 mg total) by Per G Tube route once daily.  What changed:    · how much to take  · how to take this     predniSONE 20 MG tablet  Commonly known as:  DELTASONE  Take 2 tablets (40 mg total) by mouth once daily.  Start taking on:  10/27/2018  What changed:    · medication strength  · how much to take  · Another medication with the same name was removed. Continue taking this medication, and follow the directions you see here.        CONTINUE taking these medications    acetaminophen 500 MG tablet  Commonly known as:  TYLENOL  Take 1,000 mg by mouth daily as needed for Pain.     aspirin 81 MG EC tablet  Commonly known as:  ECOTRIN  Take 81 mg by mouth once daily.     baclofen 10 MG tablet  Commonly known as:  LIORESAL  Take 1 tablet (10 mg total) by mouth 3 (three) times daily.     carvedilol 12.5 MG tablet  Commonly known as:  COREG  Take 1 tablet (12.5 mg total) by mouth 2 (two) times daily with meals.     clopidogrel 75 mg tablet  Commonly known as:  PLAVIX  Take 75 mg by mouth once daily.     furosemide 40 MG tablet  Commonly known as:  LASIX  Take 20 mg by mouth once daily.      gabapentin 300 MG capsule  Commonly known as:  NEURONTIN  Take 1 capsule (300 mg total) by mouth 3 (three) times daily.     hydroxychloroquine 200 mg tablet  Commonly known as:  PLAQUENIL  Take 2 tablets (400 mg total) by mouth once daily.     magnesium oxide 400 mg (241.3 mg magnesium) tablet  Commonly known as:  MAG-OX  Take 400 mg by mouth once daily.     montelukast 10 mg tablet  Commonly known as:  SINGULAIR  Take 1 tablet (10 mg total) by mouth every evening.     potassium chloride SA 10 MEQ tablet  Commonly known as:  K-DUR,KLOR-CON  Take 2 tablets (20 mEq total) by mouth once daily.        STOP taking these medications    levoFLOXacin 500 MG tablet  Commonly known as:  LEVAQUIN            Indwelling Lines/Drains at time of discharge:   Lines/Drains/Airways     Drain                 Gastrostomy/Enterostomy 04/30/18 1654 Gastrostomy tube w/ balloon LUQ feeding 178 days    Female External Urinary Catheter 10/25/18 1142 less than 1 day                Time spent on the discharge of patient: 30 minutes  Patient was seen and examined on the date of discharge and determined to be suitable for discharge.         Sadia Ness MD  Department of Hospital Medicine  [facility]

## 2018-10-26 NOTE — ASSESSMENT & PLAN NOTE
-Pulmonology consulted for bronchoscope but patient declining at this time  -Infectious Disease consulted and recommended coverage for HCAP with vanco, unasyn,and azithromycin  -Pulmonology believes condition most likely due steroid responsive disease   -Low suspicion of acute deterioration due to aspiration PNA but will continue to cover empirical antiboitic coverage; Low concern etiology due for CHF due to echo w/ EF 60-65% and noted normal atrial pressure    -discontinued vanco, unasyn,and azithromycin due   -continue Prednisone 40mg daily and Bactrim prophylasix for PCP

## 2018-10-26 NOTE — PLAN OF CARE
10/26/18 1305   Discharge Reassessment   Assessment Type Discharge Planning Reassessment   Provided patient/caregiver education on the expected discharge date and the discharge plan Yes   Do you have any problems affording any of your prescribed medications? No   Discharge Plan A Home   Patient choice form signed by patient/caregiver N/A   Anticipated Discharge Disposition Home     Plan to return home with possible outpatient therapy.  Primary team to place ambulatory PT referral in [facility], pt will be contacted by therapy service to initiate sessions.    Future Appointments   Date Time Provider Department Center   10/30/2018 11:45 AM Jessica Thacker MD [facility]   11/1/2018 10:30 AM Dee Thomson MD [facility]   11/6/2018  2:00 PM LAB, SAME DAY [facility]   11/7/2018  1:30 PM Lonnie Rouse MD [facility] R[facility] [address]   11/26/2018 12:20 PM Baldomero Giang MD [facility] NEURO [facility]   1/18/2019  4:20 PM Bhargav Lee MD [facility] [facility]

## 2018-10-26 NOTE — SUBJECTIVE & OBJECTIVE
Interval History: ***    Review of Systems  Objective:     Vital Signs (Most Recent):  Temp: 97.5 °F (36.4 °C) (10/26/18 0827)  Pulse: 72 (10/26/18 0809)  Resp: 16 (10/26/18 0809)  BP: (!) 164/77 (10/26/18 0809)  SpO2: (!) 93 % (10/26/18 0809) Vital Signs (24h Range):  Temp:  [97 °F (36.1 °C)-98.5 °F (36.9 °C)] 97.5 °F (36.4 °C)  Pulse:  [72-91] 72  Resp:  [12-21] 16  SpO2:  [93 %-99 %] 93 %  BP: (121-165)/(72-80) 164/77     Weight: 72.6 kg (160 lb)  Body mass index is 29.26 kg/m².    Intake/Output Summary (Last 24 hours) at 10/26/2018 1032  Last data filed at 10/26/2018 0500  Gross per 24 hour   Intake 300 ml   Output --   Net 300 ml      Physical Exam    Significant Labs: {Results:16677}    Significant Imaging: {Imaging Review:77550}

## 2018-10-26 NOTE — PLAN OF CARE
SW following for d/c needs. Pt expected to d/c with outpatient therapy.  No other social needs identified.               Ayush Walters, MSW, LCSW  [facility]  [id number]

## 2018-10-26 NOTE — NURSING
Discharge papers reviewed with patient. Patient verbalizes understanding of orders.Patient awaiting family to arrive at this time. No further questions from patient at this time. Belongings with patient. IV removed tip intact. Visi removed.

## 2018-10-26 NOTE — ASSESSMENT & PLAN NOTE
84yo woman w/a history of prior CVA, pAF, RA (dx 2012; RF seropositive, erosive; on maintenance HCQ/prednisone 5mg and s/p humira 3/22/2018 c/b multifocal HCAP s/p empiric vanc/zosyn/azithro course with associated hypoxic RF requiring ICU admission/prolonged rehab stay in 4/5/2018 to 5/2/2018; undergoing prolonged steroid wean with recent escalation to prednisone 30mg daily + LVQ course on 10/10 for presumptive pulmonary manifestation of rheumatologic disease vs CAP), IBS, and Hasimotos thyroiditis (goiter) who was admitted on 10/23/2018 for further evaluation of 3wks of progressive malaise, BEJARANO, and hypoxia (O2 80% in ID clinic) with recurrent bilateral patchy peripheral consolidations with air bronchograms (similar to 4/2018 CT chest but had resolved in interim), concerning for multifocal pneumonia (HCAP with bacterial pathogens not excluded as steroids could be suppressing inflammatory response; IFI, PCP, and other OI's remain possible given timecourse) vs inflammatory pneumonitis (including pulmonary disease associated with RA, , hypersensitivity pneumonitis among others; of note, volume overload as a primary  of her symptoms seems less likely given lack of improvement to diuresis trial and heterogeneous pattern of infiltrates). She is stable on room air at present currently despite these symptoms and without improvement in BEJARANO with broad spectrum antibiotics/diuresis to date.    - patient has refused bronchoscopy for tissue biopsy/culture at this time to clarify diagnosis further but is amenable to trial of antimicrobial agents to assess for clinical improvement while awaiting pending noninvasive studies (since she improved on a trial of antibiotics and steroids on 4/2018 admission)  - would continue empiric vanc, zosyn, and azithro to cover for HCAP pathogens for another day but without improvement on trial would stop  - have sent aspergillus antigen, fungitell, urine histo/blasto, cocci ab, crypto  ag, and RVP along with sputum cx  - steroids per pulmonary team given concern of   - pulmonary following -- will readdress possible bronchoscopy for tissue biopsy to clarify diagnosis if she fails to improve with this management strategy; if stable tomorrow on slightly increased dose of steroids, can discharge with follow-up in pulmonary clinic

## 2018-10-26 NOTE — SUBJECTIVE & OBJECTIVE
Interval History: No change in respiratory symptoms today on vanc/zosyn/azithro and after aggressive diuresis. Afebrile.     Review of Systems   Constitutional: Positive for activity change. Negative for fever.   HENT: Negative for congestion.    Respiratory: Positive for shortness of breath. Negative for cough.    Cardiovascular: Negative for chest pain.   Gastrointestinal: Negative for nausea.   Genitourinary: Negative for dysuria.   Musculoskeletal: Negative for back pain.   Skin: Negative for wound.   Neurological: Negative for headaches.   Psychiatric/Behavioral: Negative for dysphoric mood.     Objective:     Vital Signs (Most Recent):  Temp: 97 °F (36.1 °C) (10/25/18 1536)  Pulse: 91 (10/25/18 1840)  Resp: 12 (10/25/18 1840)  BP: 121/75 (10/25/18 1840)  SpO2: 98 % (10/25/18 1840) Vital Signs (24h Range):  Temp:  [97 °F (36.1 °C)-98 °F (36.7 °C)] 97 °F (36.1 °C)  Pulse:  [72-91] 91  Resp:  [12-22] 12  SpO2:  [93 %-98 %] 98 %  BP: (120-130)/(74-80) 121/75     Weight: 72.6 kg (160 lb)  Body mass index is 29.26 kg/m².    Estimated Creatinine Clearance: 41.2 mL/min (based on SCr of 1 mg/dL).    Physical Exam   Constitutional: She is oriented to person, place, and time. She appears well-developed and well-nourished. No distress.   HENT:   Head: Normocephalic and atraumatic.   Eyes: Conjunctivae are normal. No scleral icterus.   Neck: Normal range of motion.   Cardiovascular: Normal rate, regular rhythm, normal heart sounds and intact distal pulses.   Pulmonary/Chest: Effort normal and breath sounds normal.   Abdominal: Soft. She exhibits no distension. There is no tenderness.   Musculoskeletal: Normal range of motion. She exhibits edema (2+ bilateral LE).   Chronic changes of arthropathy affecting hands but no obvious active synovitis.   Neurological: She is alert and oriented to person, place, and time.   Skin: Skin is warm and dry.   Psychiatric: She has a normal mood and affect. Her behavior is normal.        Significant Labs:   CBC:   Recent Labs   Lab 10/23/18  2342 10/24/18  0634 10/25/18  0634   WBC 15.88* 17.54* 16.95*   HGB 11.9* 11.4* 11.0*   HCT 39.2 37.5 36.2*    205 192     CMP:   Recent Labs   Lab 10/23/18  2342 10/24/18  0634 10/25/18  0634    140 140   K 4.1 3.5 3.4*   CL 98 100 99   CO2 33* 29 36*   * 236* 112*   BUN 19 16 16   CREATININE 1.1 0.8 1.0   CALCIUM 9.2 8.7 9.0   PROT 6.5 5.6* 5.7*   ALBUMIN 3.0* 2.6* 2.7*   BILITOT 0.5 0.5 0.6   ALKPHOS 62 59 52*   AST 7* 7* 7*   ALT 8* 7* 6*   ANIONGAP 8 11 5*   EGFRNONAA 47.2* >60.0 53.0*       Significant Imaging: I have reviewed all pertinent imaging results/findings within the past 24 hours.

## 2018-10-26 NOTE — NURSING
Home Oxygen Evaluation    Date Performed: 10/26/2018    1) Patient's Home O2 Sat on room air, while at rest: 92%        If O2 sats on room air at rest are 88% or below, patient qualifies. No additional testing needed. Document N/A in steps 2 and 3. If 89% or above, complete steps 2.      2) Patient's O2 Sat on room air while exercisin%        If O2 sats on room air while exercising remain 89% or above patient does not qualify, no further testing needed Document N/A in step 3. If O2 sats on room air while exercising are 88% or below, continue to step 3.      3) Patient's O2 Sat while exercising on O2: 90 at 0 LPM         (Must show improvement from #2 for patients to qualify)    If O2 sats improve on oxygen, patient qualifies for portable oxygen. If not, the patient does not qualify.

## 2018-10-29 ENCOUNTER — NURSE TRIAGE (OUTPATIENT)
Dept: ADMINISTRATIVE | Facility: CLINIC | Age: 83
End: 2018-10-29

## 2018-10-29 ENCOUNTER — OUTPATIENT CASE MANAGEMENT (OUTPATIENT)
Dept: ADMINISTRATIVE | Facility: OTHER | Age: 83
End: 2018-10-29

## 2018-10-29 ENCOUNTER — PATIENT OUTREACH (OUTPATIENT)
Dept: ADMINISTRATIVE | Facility: CLINIC | Age: 83
End: 2018-10-29

## 2018-10-29 DIAGNOSIS — J96.20 ACUTE ON CHRONIC RESPIRATORY FAILURE, UNSPECIFIED WHETHER WITH HYPOXIA OR HYPERCAPNIA: Primary | ICD-10-CM

## 2018-10-29 LAB
ASPERGILLUS AB SER QL ID: NORMAL
B DERMAT AB SER QL ID: NORMAL
C IMMITIS AB SER QL ID: NORMAL
H CAPSUL AB TITR SER ID: NORMAL {TITER}

## 2018-10-29 RX ORDER — DIAZEPAM 2 MG/1
2 TABLET ORAL
COMMUNITY
End: 2018-11-26 | Stop reason: SDUPTHER

## 2018-10-29 NOTE — TELEPHONE ENCOUNTER
Daughter states that Mom is c\o numbness to fingers on both hands With shaking in right hand. Daughter states has taken old Rx of Valium 2 mg last 2 nights which has helped. She states had shaking hands s\p past stroke, but got better and has started again since home from hospital. Daughter agreed to home NP visit so asked that NP see her within 3 days. Instructed daughter if slurred speech, sudden, severe headache, weakness to one side of body\face, trouble talking\walking to call 911. Message routed to pcp.  Reason for Disposition   Numbness or tingling in one or both hands is a chronic symptom (recurrent or ongoing problem lasting > 4 weeks)    Protocols used: ST NEUROLOGIC DEFICIT-A-[place]

## 2018-10-29 NOTE — PATIENT INSTRUCTIONS
Preventing Falls in the Home  An adult or child can fall for many reasons. If you are an older adult, you may fall because your reaction time slows down. Your muscles and joints may get stiff, weak, or less flexible because of illness, medicines, or a physical condition. These things can also make a child more likely to fall or be injured in a fall.  Other health problems that make falls more likely include:  · Arthritis  · Dizziness or lightheadedness when you get out of bed (orthostatic hypotension)  · History of a stroke  · Dizziness  · Anemia  · Certain medicines taken for mental illness  · Problems with balance or gait  · History of falls with or without an injury  · Changes in vision (vision impairment)  · Changes in thinking skills and memory (cognitive impairment)  Injuries from a fall can include broken bones, dislocated joints, and cuts. When these injuries are serious enough, they can make it impossible for you or a child who is injured in a fall to live on his or her own.  Prevention tips  To help prevent falls and fall-related injuries, follow the tips below.   Floors  Make floors safer by doing the following:   · Put nonskid pads under area rugs.  · Remove throw rugs.  · Replace worn floor coverings.  · Tack carpets firmly to each step on carpeted stairs. Put nonskid strips on the edges of uncarpeted stairs.  · Keep floors and stairs free of clutter and cords.  · Arrange furniture so there are clear pathways.  · Clean up any spills right away.  · Wear shoes that fit.  Bathrooms    Make bathrooms safer by doing the following:   · Install grab bars in the tub or shower.  · Apply nonskid strips or put a nonskid rubber mat in the tub or shower.  · Sit on a bath chair to bathe.  · Use bathmats with nonskid backing.  Lighting and the environment  Improve lighting in your home by doing the following:   · Keep a flashlight in each room. Or put a lamp next to the bed within easy reach.  · Put nightlights in  the bedrooms, hallways, kitchen, and bathrooms.  · Make sure all stairways have good lighting.  · Take your time when going up and down stairs.  · Put handrails on both sides of stairs and in walkways for more support. To prevent injury to your wrist or arm, dont use handrails to pull yourself up.  · Install grab bars to pull yourself up.  · Move or rearrange items that you use often. This will make them easier to find or reach.  · Look at your home to find any safety hazards. Especially look at doorways, walkways, and the driveway. Remove or repair any safety problems that you find.  Date Last Reviewed: 8/1/2016  © 0508-3783 [employer]. [address] [place] [address], [place] [address]. All rights reserved. This information is not intended as a substitute for professional medical care. Always follow your healthcare professional's instructions.

## 2018-10-29 NOTE — PT/OT/SLP DISCHARGE
Occupational Therapy Discharge Summary    Selma Alonzo Lux MD  MRN: [id number]   Principal Problem: Acute on chronic respiratory failure      Patient Discharged from acute Occupational Therapy on 10/29/18.  Please refer to prior OT note dated 10/25/18 for functional status.    Assessment:      Patient has not met goals.    Objective:     GOALS:   Multidisciplinary Problems     Occupational Therapy Goals        Problem: Occupational Therapy Goal    Goal Priority Disciplines Outcome Interventions   Occupational Therapy Goal     OT, PT/OT Ongoing (interventions implemented as appropriate)    Description:  Goals to be met by: 11/8  Patient will increase functional independence with ADLs by performing:    UE Dressing with [place] while seated.  LE Dressing with Modified [place].  Grooming while standing at sink with Modified [place].  Toileting from toilet with [place] for hygiene and clothing management.   Toilet transfer to toilet with Modified [place].  Upper extremity exercise program x10 reps per handout, with independence to increase endurance to functional task.  Functional mobility with AD as needed for short household distance with no LOB with modified independence.                       Reasons for Discontinuation of Therapy Services  DC from acute care      Plan:     Patient Discharged to: Outpatient Therapy Services    AYAD Burgos  10/29/2018

## 2018-10-29 NOTE — PROGRESS NOTES
Thank you for the referral. The following patient has been assigned to Domonique Baez RN with Outpatient Complex Care Management for high risk screening.    Reason for referral: Acute on chronic respiratory failure, unspecified whether with hypoxia or hypercapnia    Please contact [facility] at [phone] with any questions.    Thank you,    Laura Lim    Outpatient Case Management

## 2018-10-29 NOTE — PROGRESS NOTES
C3 nurse attempted to contact patient. The following occurred:   C3 nurse attempted to contact Selma Alonzo Lux MD's daughter for a TCC post hospital discharge follow up call. She is unable to conduct the call @ this time. She requested a callback.    The patient does not have a scheduled HOSFU appointment within 7 days post hospital discharge date 10\26. Message sent to Physician staff to assist with HOSFU appointment scheduling.

## 2018-10-29 NOTE — PROGRESS NOTES
Daughter stated that Mom was taken off of Valium and she thinks put on Gabapentin in the place of by neurologist. Mom used to take Valium for hand shakes s\p stroke. She asked if ok to resume. Instructed that I as a nurse can't tell her to resume med not on med rec and refer to neuro or ask NP when she comes in the home for visit. She stated that she will refer to neuro.

## 2018-10-30 ENCOUNTER — OFFICE VISIT (OUTPATIENT)
Dept: ORTHOPEDICS | Facility: CLINIC | Age: 83
End: 2018-10-30
Payer: MEDICARE

## 2018-10-30 VITALS
HEART RATE: 77 BPM | DIASTOLIC BLOOD PRESSURE: 72 MMHG | WEIGHT: 160 LBS | SYSTOLIC BLOOD PRESSURE: 130 MMHG | HEIGHT: 62 IN | BODY MASS INDEX: 29.44 KG/M2

## 2018-10-30 DIAGNOSIS — M25.512 CHRONIC LEFT SHOULDER PAIN: Primary | ICD-10-CM

## 2018-10-30 DIAGNOSIS — G89.29 CHRONIC LEFT SHOULDER PAIN: Primary | ICD-10-CM

## 2018-10-30 LAB
BLASTOMYCES AG INTERP: NEGATIVE
BLASTOMYCES AG RESULT: NORMAL NG/ML
BLASTOMYCES AG SPECIMEN: NORMAL

## 2018-10-30 PROCEDURE — 99213 OFFICE O/P EST LOW 20 MIN: CPT | Mod: S$PBB,,, | Performed by: ORTHOPAEDIC SURGERY

## 2018-10-30 PROCEDURE — 99213 OFFICE O/P EST LOW 20 MIN: CPT | Mod: PBBFAC | Performed by: ORTHOPAEDIC SURGERY

## 2018-10-30 PROCEDURE — 99999 PR PBB SHADOW E&M-EST. PATIENT-LVL III: CPT | Mod: PBBFAC,,, | Performed by: ORTHOPAEDIC SURGERY

## 2018-10-30 NOTE — TELEPHONE ENCOUNTER
Spoke with pt, states NP will come out to home either tomorrow or the following day. Advised of upcoming neuro appointment. Pt would like to be seen sooner than 11/26. Waiting for Dr Giang office to respond for sooner appointment.

## 2018-10-30 NOTE — TELEPHONE ENCOUNTER
I suggest home NP visit, follow up with neurology ASAP, and follow up with me as well to evaluate. Thank you.

## 2018-10-30 NOTE — PROGRESS NOTES
Subjective:      Patient ID: Selma Lux MD is a 83 y.o. female.    Chief Complaint: Numbness of the Left Hand and Pain of the Left Shoulder      HPI  Selma Lux MD is a  83 y.o. female presenting today for left shoulder pain. She notes she was dx with bl rotator cuff tears about 3-5 years ago, this was treated conservatively with PT as well as injections. She reports she did have improvement from this aspect. About a year ago she began having pain again, especially in the left shoulder, this worsened over the past 6 months. She has not yet tried anything for this. She is currently in PT for another reason.     10/30/18  Pt presents for follow up left shoulder pain. Since last visit, she was hospitalized for other medical problems. Therefore, she was unable to complete therapy. She notes temporary relief from the injection done last visit. She does continue to have shoulder pain and limited ROM.     Review of patient's allergies indicates:  No Known Allergies      Current Outpatient Medications   Medication Sig Dispense Refill    acetaminophen (TYLENOL) 500 MG tablet Take 1,000 mg by mouth daily as needed for Pain.      aspirin (ECOTRIN) 81 MG EC tablet Take 81 mg by mouth once daily.      baclofen (LIORESAL) 10 MG tablet Take 1 tablet (10 mg total) by mouth 3 (three) times daily. 270 tablet 3    carvedilol (COREG) 12.5 MG tablet Take 1 tablet (12.5 mg total) by mouth 2 (two) times daily with meals. 180 tablet 3    clopidogrel (PLAVIX) 75 mg tablet Take 75 mg by mouth once daily.      diazePAM (VALIUM) 2 MG tablet Take 2 mg by mouth as needed for Anxiety.      furosemide (LASIX) 40 MG tablet Take 20 mg by mouth once daily.      gabapentin (NEURONTIN) 300 MG capsule Take 1 capsule (300 mg total) by mouth 3 (three) times daily. 90 capsule 2    hydroxychloroquine (PLAQUENIL) 200 mg tablet Take 2 tablets (400 mg total) by mouth once daily. 60 tablet 2    magnesium oxide (MAG-OX) 400 mg  tablet Take 400 mg by mouth once daily.      montelukast (SINGULAIR) 10 mg tablet Take 1 tablet (10 mg total) by mouth every evening. 90 tablet 3    pantoprazole (PROTONIX) 40 mg GrPS 1 packet (40 mg total) by Per G Tube route once daily. (Patient taking differently: Take 20 mg by mouth once daily. ) 30 packet 11    potassium chloride SA (K-DUR,KLOR-CON) 10 MEQ tablet Take 2 tablets (20 mEq total) by mouth once daily. 60 tablet 3    predniSONE (DELTASONE) 20 MG tablet Take 2 tablets (40 mg total) by mouth once daily. 60 tablet 0    sulfamethoxazole-trimethoprim 800-160mg (BACTRIM DS) 800-160 mg Tab Take 1 tablet by mouth once daily. 30 tablet 0     No current facility-administered medications for this visit.        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    Hypertension     IBS (irritable bowel syndrome) 6/21/2017    IGT (impaired glucose tolerance) 1/12/2016    Iron deficiency anemia secondary to inadequate dietary iron intake 6/24/2013    Joint pain     Keratoconjunctivitis sicca of both eyes not specified as Sjogren's 7/29/2016    Leiomyoma of uterus, unspecified 9/16/2014    Long QT interval 6/29/2016    Due to medication (plaquenil)     Macular edema 1/12/2015    Multinodular goiter 1/12/2016    Neuropathy 8/23/2017    Plaquenil causing adverse effect in therapeutic use 10/7/2016    Pneumococcal vaccine refused 8/17/2016    Pneumonia due to Streptococcus pneumoniae 4/5/2018    Primary osteoarthritis involving multiple  "joints 10/21/2015    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    s/p Right Total knee replacement 5/15/2013    Scoliosis of thoracic spine 5/15/2013    Small vessel disease, cerebrovascular 12/28/2017    Stroke     Vascular dementia 12/6/2017       Past Surgical History:   Procedure Laterality Date    CATARACT EXTRACTION      COLONOSCOPY N/A 9/29/2015    Procedure: COLONOSCOPY;  Surgeon: FIDELINA Sanchez MD;  Location: [facility] (4TH FLR);  Service: Endoscopy;  Laterality: N/A;    COLONOSCOPY N/A 9/29/2015    Performed by FIDELINA Sanchez MD at [facility] ENDO (4TH FLR)    EGD (ESOPHAGOGASTRODUODENOSCOPY) N/A 3/11/2014    Performed by Federico Escobar MD at [facility] ENDO (4TH FLR)    EGD (ESOPHAGOGASTRODUODENOSCOPY) N/A 11/5/2013    Performed by Federico Escobar MD at [facility] (4TH FLR)    ESOPHAGOGASTRODUODENOSCOPY (EGD) N/A 10/4/2017    Performed [facility] Mg Morton [facility] at [facility] ENDO    EYE SURGERY      INJECTION-STEROID-EPIDURAL-TRANSFORAMINAL Right 9/20/2017    Per[facility]elin [facility]dez [facility] at [facility]IN MGT    JOINT REPLACEMENT      right knee    KNEE SURGERY Left 12/31/2013    TKR    left parotidectomy      mixed tumor    SALIVARY GLAND SURGERY      TONSILLECTOMY         Review of Systems:  Constitutional: Negative for chills and fever.   Respiratory: Negative for cough and shortness of breath.    Gastrointestinal: Negative for nausea and vomiting.   Skin: Negative for rash.   Neurological: Negative for dizziness and headaches.   Psychiatric/Behavioral: Negative for depression.   MSK as in HPI       OBJECTIVE:     PHYSICAL EXAM:  /72   Pulse 77   Ht 5' 2" (1.575 m)   Wt 72.6 kg (160 lb)   LMP  (LMP Unknown)   BMI 29.26 kg/m²     GEN:  NAD, well-developed, well-groomed.  NEURO: Awake, alert, and oriented. Normal attention and concentration.    PSYCH: Normal mood and affect. Behavior is normal.  HEENT: No cervical lymphadenopathy noted.  CARDIOVASCULAR: Radial pulses 2+ bilaterally. No LE edema " noted.  PULMONARY: Breath sounds normal. No respiratory distress.  SKIN: Intact, no rashes.      MSK:   LUE:  Good active ROM of the wrist and fingers. She has decreased ROM of the left shoulder, >45 degrees forward elevation and abduction, fair ER, limited IR. No significant ttp over the shoulder. AIN/PIN/Radial/Median/Ulnar Nerves assessed in isolation without deficit. Radial & Ulnar arteries palpated 2+. Capillary Refill <3s.      RADIOGRAPHS:  Xray left shoulder 3/27/18  Impression   No evidence for acute fracture, bone destruction, or dislocation.    Advanced degenerative changes of the glenohumeral joint.    Narrowing of the subacromial distance which is less pronounced than on the prior examination dated 02/05/2015.     Comments: I have personally reviewed the imaging and I agree with the above radiologist's report.    ASSESSMENT/PLAN:       ICD-10-CM ICD-9-CM   1. Chronic left shoulder pain M25.512 719.41    G89.29 338.29       Orders Placed This Encounter    Ambulatory Referral to Physical/Occupational Therapy        Plan:   -treatment options discussed with pt. She would like to complete therapy- script printed and given to pt. Compound cream also ordered. She declines repeat injection today.   -RTC 6 wks           The patient indicates understanding of these issues and agrees to the plan.    Jodee Perea PA-C  [facility]   [facility] [place]

## 2018-10-31 ENCOUNTER — EXTERNAL CHRONIC CARE MANAGEMENT (OUTPATIENT)
Dept: PRIMARY CARE CLINIC | Facility: CLINIC | Age: 83
End: 2018-10-31
Payer: MEDICARE

## 2018-10-31 PROCEDURE — 99490 CHRNC CARE MGMT STAFF 1ST 20: CPT | Mod: S$PBB,,, | Performed by: FAMILY MEDICINE

## 2018-10-31 PROCEDURE — 99490 CHRNC CARE MGMT STAFF 1ST 20: CPT | Mod: PBBFAC | Performed by: FAMILY MEDICINE

## 2018-10-31 NOTE — PROGRESS NOTES
I have personally taken the history and examined the patient. I agree with the Hand Surgery PA's note. The plan will be OT and compound cream. Pt has gone through a long recovery form her lung issues. She is not interested in injections or surgery.

## 2018-11-01 ENCOUNTER — OFFICE VISIT (OUTPATIENT)
Dept: SPINE | Facility: CLINIC | Age: 83
End: 2018-11-01
Attending: PHYSICAL MEDICINE & REHABILITATION
Payer: MEDICARE

## 2018-11-01 VITALS
SYSTOLIC BLOOD PRESSURE: 142 MMHG | WEIGHT: 169.81 LBS | HEIGHT: 62 IN | DIASTOLIC BLOOD PRESSURE: 82 MMHG | BODY MASS INDEX: 31.25 KG/M2 | HEART RATE: 72 BPM

## 2018-11-01 DIAGNOSIS — M54.2 NECK PAIN: Primary | ICD-10-CM

## 2018-11-01 DIAGNOSIS — M25.512 CHRONIC LEFT SHOULDER PAIN: ICD-10-CM

## 2018-11-01 DIAGNOSIS — G89.29 CHRONIC LEFT SHOULDER PAIN: ICD-10-CM

## 2018-11-01 DIAGNOSIS — M47.812 SPONDYLOSIS OF CERVICAL REGION WITHOUT MYELOPATHY OR RADICULOPATHY: ICD-10-CM

## 2018-11-01 DIAGNOSIS — R53.81 PHYSICAL DECONDITIONING: ICD-10-CM

## 2018-11-01 DIAGNOSIS — G24.3 ISOLATED CERVICAL DYSTONIA: ICD-10-CM

## 2018-11-01 DIAGNOSIS — G25.5 HEMICHOREA: ICD-10-CM

## 2018-11-01 DIAGNOSIS — M62.838 MUSCLE SPASM: ICD-10-CM

## 2018-11-01 PROCEDURE — 99999 PR PBB SHADOW E&M-EST. PATIENT-LVL III: CPT | Mod: PBBFAC,,, | Performed by: PHYSICAL MEDICINE & REHABILITATION

## 2018-11-01 PROCEDURE — 99213 OFFICE O/P EST LOW 20 MIN: CPT | Mod: PBBFAC | Performed by: PHYSICAL MEDICINE & REHABILITATION

## 2018-11-01 PROCEDURE — 99214 OFFICE O/P EST MOD 30 MIN: CPT | Mod: S$PBB,,, | Performed by: PHYSICAL MEDICINE & REHABILITATION

## 2018-11-01 NOTE — PROGRESS NOTES
Subjective:      Patient ID: Selma Rosado MD Jose J is a 83 y.o. female.    Chief Complaint: No chief complaint on file.     Lux is an 82 yo female here for follow up of her neck pain from 7-8 years that worsened in November.  She was last seen by me on 8/20/2018 and she was recovering from hospitalization and was on a steroid taper and going to PT.  She was not complaining of neck pain.   she had botox on 2/21/2018 was continuing to have neck pain on the left.  We sent her back to PT for her neck and she was just starting.  She has seen Dr. Landa for her low back and had a Right L4/5 TFESI with Dr. Valdez.  She has left sided hemichorea.    She had trigger point in SCM on 1/17/2018.  In April saw Dr Prakash got one injection of Humira and 2 weeks later admitted to ICU with resp infection; spent a month in ICU, then weeks in rehab.  Today, she is doing well.  She had an episode of SOB last week, and so was in the hospital for a few days.  She has been recovering, but the stay in the hospital made her weaker.  She has been trying to get herself stronger.  She is using a walker.  She is still having neck pain.  She takes the medicine.  She just restarted the medicine.  She feels like cmpd cream helps.  She can have pain if she moves her left arm.  She saw Dr. Mortensen a couple days ago.  She does not want to do surgery.  She does not want an injection at this time due to meds.  She feels like cannot lift arm over the head. She has a lipoma on the right shoulder, that concerns her.  She had it removed once.  She does not want to do anything about it.  She has not been to PT in couple of weeks.  She has an appointment next week.  She is going back to get botox in her neck.  She does feel like that helps.    MRI cervical 3/2017  There is mild anterolisthesis C2 on C3 and mild retrolisthesis of C3 on C4.  There is also mild retrolisthesis of C6 on C7.  The spondylolisthesis is likely secondary to  ligamentous laxity.  There is straightening of normal cervical lordosis. Vertebral body heights are well maintained without evidence of fracture or marrow signal abnormality suspicious for an infiltrative process.  There is loss of intervertebral disc space height at C5-6 and C6-7 Visualized posterior fossa, cervical cord, and upper thoracic cord demonstrate normal signal. Surrounding soft tissue structures demonstrate no significant abnormalities.      C2-3:  Posterior disc osteophyte complex and bilateral facet arthropathy.  Effacement of the anterior thecal sac, without flattening of the spinal cord.  No significant neural foraminal narrowing.     C3-4:  Posterior disc osteophyte complex and bilateral facet arthropathy, resulting in effacement of the anterior thecal sac, without flattening of the spinal cord.  There is moderate left-sided neuroforaminal narrowing.       C4-5:  Posterior disc osteophyte complex and bilateral facet arthropathy, resulting in effacement of the anterior thecal sac, without flattening of the spinal cord.  There is no significant neural foraminal narrowing.     C5-6:  Posterior disc osteophyte complex and bilateral facet arthropathy, resulting in effacement of the anterior thecal sac, without flattening of spinal cord.  No significant neural foraminal narrowing.     C6-7:  Posterior disc osteophyte complex and bilateral facet arthropathy, resulting in effacement of the anterior thecal sac, without flattening of the spinal cord.  No significant neural foraminal narrowing.     C7-T1:  No significant spinal canal stenosis.  No significant neural foraminal narrowing.  Impression         There is multilevel degenerative changes of the cervical spine, resulting in mild spinal canal stenosis and neuroforaminal narrowing as detailed above.      X-ray cervical  AP, neutral lateral, flexion lateral, and extension lateral radiographs of the cervical spine were obtained.  Note that the  cervicothoracic junction is not optimally visualized.  There is 2 mm anterolisthesis of C2 on C3 which increases to 3 mm on the flexion radiographs.  The remainder of the posterior vertebral alignment is satisfactory.  Vertebral body heights are well-maintained.  There are advanced degenerative changes identified throughout the cervical spine with disc space narrowing and anterior and posterior osteophyte formation.  There is solid bony fusion of the C5 and C6 vertebral bodies.  There is mild facet arthropathy noted throughout the cervical spine.  Atherosclerotic calcification is identified in the region of the carotid arteries bilaterally.  Impression         2 mm anterolisthesis of C2 on C3 which appears to be degenerative in etiology.  This increases to 3 mm on the flexion radiograph of the cervical spine.  Cervical spondylosis.  Atherosclerosis.    X-ray left shoulder 2015  Left shoulder: Significant acromiohumeral interval narrowing, this can be associated with a rotator cuff tear.  The humeral head demonstrate normal contour.  No osseous lesions, sclerosis or significant osteophyte formation.  The acromioclavicular joint   appears within normal limits.  The soft tissues appear normal.    Past Medical History:  No date: Anemia  No date: Arthritis  No date: Hashimoto's disease  No date: Hypertension  1/12/2016: IGT (impaired glucose tolerance)  No date: Joint pain  1/12/2016: Multinodular goiter    Past Surgical History:  No date: CATARACT EXTRACTION  9/29/2015: COLONOSCOPY N/A      Comment: Procedure: COLONOSCOPY;  Surgeon: FIDELINA Sanchez MD;  Location: [address];                 Service: Endoscopy;  Laterality: N/A;  No date: JOINT REPLACEMENT      Comment: right knee  12/31/2013: KNEE SURGERY Left      Comment: TKR  No date: left parotidectomy      Comment: mixed tumor  No date: SALIVARY GLAND SURGERY  No date: TONSILLECTOMY    Review of patient's family history indicates:     Hypertension                   Mother                    Prostate cancer                Father                      Comment: prostate cancer    Breast cancer                  Maternal Grandmother      Lupus                          Neg Hx                    Psoriasis                      Neg Hx                    Melanoma                       Neg Hx                    Colon cancer                   Neg Hx                      Social History    Marital status:              Spouse name:                       Years of education:                 Number of children:               Social History Main Topics    Smoking status: Never Smoker                                                                Smokeless tobacco: Never Used                        Alcohol use: No                 Comment: very seldom     Drug use: No              Sexual activity: No                      Comment: ,  age 52,         Current Outpatient Prescriptions:  amlodipine (NORVASC) 5 MG tablet, TAKE 1 TABLET DAILY, Disp: 90 tablet, Rfl: 2  baclofen (LIORESAL) 10 MG tablet, Take 1 tablet (10 mg total) by mouth 3 (three) times daily., Disp: 270 tablet, Rfl: 2  clopidogrel (PLAVIX) 75 mg tablet, Take 1 tablet (75 mg total) by mouth once daily., Disp: 90 tablet, Rfl: 1  deutetrabenazine (AUSTEDO) 9 mg Tab, Take 2 tablets by mouth 2 (two) times daily. Final titrating dose - needs initially titration pack from company, Disp: 120 tablet, Rfl: 11  diazePAM (VALIUM) 2 MG tablet, Take 1 tablet (2 mg total) by mouth every evening., Disp: 30 tablet, Rfl: 3  ferrous sulfate 325 (65 FE) MG EC tablet, Take 325 mg by mouth once daily. , Disp: , Rfl:   gabapentin (NEURONTIN) 300 MG capsule, Take 1-2 capsules (300-600 mg total) by mouth 3 (three) times daily., Disp: 540 capsule, Rfl: 1  hydrocodone-acetaminophen 5-325mg (NORCO) 5-325 mg per tablet, Take 1 tablet by mouth every 24 hours as needed for Pain., Disp: 30 tablet, Rfl:  0  hydroxychloroquine (PLAQUENIL) 200 mg tablet, TAKE 2 TABLETS ONCE DAILY, Disp: 180 tablet, Rfl: 1  linaclotide (LINZESS) 145 mcg Cap capsule, Take 1 capsule (145 mcg total) by mouth once daily., Disp: 30 capsule, Rfl: 0  memantine (NAMENDA XR) 7-14-21-28 mg C24k, Follow titration instructions 7 mg x1. 14 mg x1 week. 21 mg x1 week. 28 mg x1 week., Disp: 1 each, Rfl: 0  memantine 28 mg CSpX, Take 1 capsule (28 mg total) by mouth once daily. START after titration pack completed., Disp: 30 capsule, Rfl: 3  montelukast (SINGULAIR) 10 mg tablet, TAKE 1 TABLET EVERY EVENING, Disp: 90 tablet, Rfl: 2  omega 3-dha-epa-fish oil 250-350-1,000 mg Cap, Take 1 capsule by mouth 2 (two) times daily., Disp: 180 capsule, Rfl: 3  predniSONE (DELTASONE) 5 MG tablet, TAKE 2 TABLETS ONCE DAILY, Disp: 180 tablet, Rfl: 0  sertraline (ZOLOFT) 25 MG tablet, Take 1 tablet (25 mg total) by mouth once daily., Disp: 90 tablet, Rfl: 1  thiamine 100 MG tablet, Take 1 tablet (100 mg total) by mouth once daily., Disp: , Rfl:     No current facility-administered medications for this visit.       Review of patient's allergies indicates:  No Known Allergies          Review of Systems   Constitution: Negative for weight gain and weight loss.   Cardiovascular: Positive for dyspnea on exertion. Negative for chest pain.   Respiratory: Negative for shortness of breath.    Musculoskeletal: Positive for joint pain and neck pain. Negative for joint swelling.   Gastrointestinal: Negative for abdominal pain, bowel incontinence, nausea and vomiting.   Genitourinary: Positive for urgency. Negative for bladder incontinence.   Neurological: Negative for numbness.         Objective:        General: Selma is well-developed, well-nourished, appears stated age, in no acute distress, alert and oriented to time, place and person.     General    Vitals reviewed.  Constitutional: She is oriented to person, place, and time. She appears well-developed and well-nourished.    HENT:   Head: Normocephalic and atraumatic.   Pulmonary/Chest: Effort normal.   Neurological: She is alert and oriented to person, place, and time.   Psychiatric: She has a normal mood and affect. Her behavior is normal. Judgment and thought content normal.     General Musculoskeletal Exam   Gait: abnormal (on a cane today)     Back (L-Spine & T-Spine) / Neck (C-Spine) Exam     Tenderness   The patient is tender to palpation of the left SCM. Left paramedian tenderness of the Upper C-Spine.     Spinal Sensation   Right Side Sensation  C-Spine Level: normal   L-Spine Level: normal  S-Spine Level: normal  Left Side Sensation  C-Spine Level: normal  L-Spine Level: normal  S-Spine Level: normal    Other She has no scoliosis .  Spinal Kyphosis:  Absent      Muscle Strength   Right Upper Extremity   Biceps: 5/5/5   Deltoid:  5/5  Triceps:  5/5  Wrist extension: 5/5/5   : 4/5/5   Finger Flexors:  5/5  Left Upper Extremity  Biceps: 5/5/5   Deltoid:  5/5  Triceps:  5/5  Wrist extension: 5/5/5   :  4/5/5   Finger Flexors:  5/5  Right Lower Extremity   Hip Flexion: 5/5   Quadriceps:  5/5   Anterior tibial:  5/5/5  Left Lower Extremity   Hip Flexion: 5/5   Quadriceps:  5/5   Anterior tibial:  5/5/5     Reflexes     Left Side  Biceps:  2+  Triceps:  2+  Brachioradialis:  2+  Quadriceps:  2+  Achilles:  2+  Left Davis's Sign:  Absent  Babinski Sign:  absent    Right Side   Biceps:  2+  Triceps:  2+  Brachioradialis:  2+  Quadriceps:  2+  Achilles:  2+  Right Davis's Sign:  absent  Babinski Sign:  absent    Vascular Exam     Right Pulses        Carotid:                  2+    Left Pulses        Carotid:                  2+              Assessment:       1. Neck pain    2. Muscle spasm    3. Hemichorea    4. Spondylosis of cervical region without myelopathy or radiculopathy    5. Isolated cervical dystonia    6. Chronic left shoulder pain    7. Physical deconditioning           Plan:          1.  She had botox for her  neck and that really helped.  She is scheduled for botox again.  She did not complain about neck pain until asked  2. Continue PT/ OT at [facility], she feels like she is doing well with getting her strength better.  She will continue therapy.  She does feel like 3 days in hospital made her weaker.  She plans to go back in a week  3.  Baclofen 10mg po TID  4.  continue gabapentin to 300 mg po TID,  5.  She is not having as much hand and arm shaking on the left.  She feels like the chorea is better.  She is not interested in doing anything for her shoulder now.  She has orders for PT for her shoulder  6.  She is getting stronger, she is on a cane and no longer using the walker.    7.  Continue to use compounded cream  8.  RTC 4 months      Follow-up: Follow-up in about 4 months (around 3/1/2019). If there are any questions prior to this, the patient was instructed to contact the office.

## 2018-11-02 ENCOUNTER — TELEPHONE (OUTPATIENT)
Dept: SPINE | Facility: CLINIC | Age: 83
End: 2018-11-02

## 2018-11-02 NOTE — TELEPHONE ENCOUNTER
Patient was contacted as per patient's daughter she stated that her mother and  discussed an injection in her shoulder and she would like to get scheduled for that. Patient was informed that the plan care didn't have that listed and that this message would be forwarded to  who is out the office today. Patient's daughter requested that  contact her mother directly to speak with her about the injection that was discussed.

## 2018-11-02 NOTE — TELEPHONE ENCOUNTER
----- Message from Kizzy Francis sent at 11/2/2018 11:15 AM CDT -----  Contact: Zehra  Name of Who is Calling:Zehra    What is the request in detail: Patient daughter states she would like to schedule injection for patient shoulder as soon as possible states pain has increased. Please contact to further discuss and advise    Can the clinic reply by [facility]: No    What Number [facility] Back [facility]: [phone]

## 2018-11-05 ENCOUNTER — OUTPATIENT CASE MANAGEMENT (OUTPATIENT)
Dept: ADMINISTRATIVE | Facility: OTHER | Age: 83
End: 2018-11-05

## 2018-11-05 ENCOUNTER — TELEPHONE (OUTPATIENT)
Dept: DERMATOLOGY | Facility: CLINIC | Age: 83
End: 2018-11-05

## 2018-11-05 NOTE — TELEPHONE ENCOUNTER
Spoke to pt.'s daughter, now has an appointment.      ----- Message from Susan Whittaker sent at 11/2/2018  4:11 PM CDT -----  Contact: pt' s daughter            Name of Who is Calling: Madhavi      What is the request in detail: requesting to reschedule her appt sooner than Jan. Pt was hospitalized and was unable to make the appt. Pt has been seen in infectious disease and may have a fungus lingering under her nails. Pt had a lung infection.  Please call and advise      Can the clinic reply by [facility]: yes      What Number to Call Back [facility] not [facility]: [phone]

## 2018-11-05 NOTE — TELEPHONE ENCOUNTER
She needs to get that scheduled with hand.  I was just reviewing her visit with Dr. Ivan odell and that an injection was offered and she declined it.  Please let her know

## 2018-11-05 NOTE — TELEPHONE ENCOUNTER
Patient was contacted and informed that if she still wanted the shoulder injection she would have to contact the Hand clinic to schedule with Dr.Sisco Vanegas. Patient stated that at this moment she will hold off. Patient was given the Hand Clinic contact info

## 2018-11-05 NOTE — PROGRESS NOTES
11/5/18  Summary:  Pt referred to [facility] for High Risk Screening. Pt s/p 10/23/18 ED/HOSP for acute on chronic resp distress, hypoxia. Last hospitalized 4/5/18 for PNA nadiya 2* infection 1-2 wks following humira treatment for RA, transferred to SNF.  Pt on a steroid taper. 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  Pt is known to [facility] from earlier this year at which time pt required further therapies s/p stroke.  Pt lives alone.  Her sister, Vania & son live next door. Currently one daughter, Madhavi from [place] has come to be with pt for an undetermined length of time and another daughter, Rosy from [place] due to arrive tonight. Pt reports having hired help assisting in home. Per Epic, it is noted that Chronic Care Management working with pt until 9/13/18.   Pt is AAOx3, is self-aware of forgetfulness. Pt care must be supervised to assure pt safety. Chief c/o today is of her left shoulder pain and plans to go ahead with steroid injection. Pt says her only need really is for assistance financially with in home assistance. Pt is amb with cane. Pt says she must end the call to get some breakfast and gives permission to complete any questions through her daughter Rosy- will have her call.   Began Initial/RN Assessment without completion.  Completed PHQ9=0 per pt.     Interventions:    Plan:  Complete Initial/RN Assessments/MED REC.         [facility] Self-Management Care Plan was developed with the patients/caregivers input and was based on identified barriers from [facility] assessment.  Goals were written today with the patient/caregiver and the patient has agreed to work towards these goals to improve his/her overall well-being. Patient verbalized understanding of the care plan, goals, and all of today's instructions. Encouraged patient/caregiver to communicate with his/her physician and health care team about health conditions and the  treatment plan.  Provided my contact information today and encouraged patient/caregiver to call me with any questions as needed.

## 2018-11-05 NOTE — PT/OT/SLP DISCHARGE
Physical Therapy Discharge Summary    Name: Selma Lux MD  MRN: [id number]   Principal Problem: Acute on chronic respiratory failure     Patient Discharged from acute Physical Therapy on 10/26/2018.  Please refer to prior PT noted date on 10/25/2018 for functional status.     Assessment:     Patient was discharged unexpectedly.  Information required to complete an accurate discharge summary is unknown.  Refer to therapy initial evaluation and last progress note for initial and most recent functional status and goal achievement.  Recommendations made may be found in medical record.    Objective:     GOALS:   Multidisciplinary Problems     Physical Therapy Goals        Problem: Physical Therapy Goal    Goal Priority Disciplines Outcome Goal Variances Interventions   Physical Therapy Goal     PT, PT/OT Ongoing (interventions implemented as appropriate)     Description:  Goals to be met by: 2018     Patient will increase functional independence with mobility by performin. Supine to sit with Modified Merrick  2. Sit to supine with Modified Merrick  3. Sit to stand transfer with Supervision  4. Gait  x 200 feet with Supervision using appropriate AD.   5. Ascend/descend 5 stair with bilateral Handrails Contact Guard Assistance.   6. Lower extremity exercise program x15 reps per handout, with supervision                      Reasons for Discontinuation of Therapy Services  Transfer to alternate level of care.      Plan:     Patient Discharged to: Outpatient Therapy Services.    ROOPA RAPP, PT  2018

## 2018-11-06 ENCOUNTER — LAB VISIT (OUTPATIENT)
Dept: LAB | Facility: HOSPITAL | Age: 83
End: 2018-11-06
Attending: INTERNAL MEDICINE
Payer: MEDICARE

## 2018-11-06 DIAGNOSIS — M05.79 SEROPOSITIVE RHEUMATOID ARTHRITIS OF MULTIPLE SITES: ICD-10-CM

## 2018-11-06 LAB
CRP SERPL-MCNC: 7.4 MG/L
ERYTHROCYTE [SEDIMENTATION RATE] IN BLOOD BY WESTERGREN METHOD: 6 MM/HR

## 2018-11-06 PROCEDURE — 36415 COLL VENOUS BLD VENIPUNCTURE: CPT

## 2018-11-06 PROCEDURE — 85652 RBC SED RATE AUTOMATED: CPT

## 2018-11-06 PROCEDURE — 86140 C-REACTIVE PROTEIN: CPT

## 2018-11-07 ENCOUNTER — OFFICE VISIT (OUTPATIENT)
Dept: RHEUMATOLOGY | Facility: CLINIC | Age: 83
End: 2018-11-07
Payer: MEDICARE

## 2018-11-07 ENCOUNTER — OFFICE VISIT (OUTPATIENT)
Dept: NEUROLOGY | Facility: CLINIC | Age: 83
End: 2018-11-07
Payer: MEDICARE

## 2018-11-07 VITALS
SYSTOLIC BLOOD PRESSURE: 135 MMHG | DIASTOLIC BLOOD PRESSURE: 69 MMHG | HEIGHT: 62 IN | WEIGHT: 160 LBS | HEART RATE: 74 BPM | BODY MASS INDEX: 29.44 KG/M2

## 2018-11-07 VITALS — DIASTOLIC BLOOD PRESSURE: 78 MMHG | HEART RATE: 74 BPM | SYSTOLIC BLOOD PRESSURE: 150 MMHG

## 2018-11-07 DIAGNOSIS — J18.9 PNEUMONIA OF BOTH LOWER LOBES DUE TO INFECTIOUS ORGANISM: ICD-10-CM

## 2018-11-07 DIAGNOSIS — F01.50 VASCULAR DEMENTIA WITHOUT BEHAVIORAL DISTURBANCE: ICD-10-CM

## 2018-11-07 DIAGNOSIS — Z79.899 HIGH RISK MEDICATION USE: ICD-10-CM

## 2018-11-07 DIAGNOSIS — M05.79 SEROPOSITIVE RHEUMATOID ARTHRITIS OF MULTIPLE SITES: Primary | ICD-10-CM

## 2018-11-07 PROBLEM — J96.20 ACUTE ON CHRONIC RESPIRATORY FAILURE: Status: RESOLVED | Noted: 2018-10-23 | Resolved: 2018-11-07

## 2018-11-07 PROBLEM — R53.81 PHYSICAL DECONDITIONING: Status: RESOLVED | Noted: 2018-07-03 | Resolved: 2018-11-07

## 2018-11-07 PROBLEM — J96.00 RESPIRATORY FAILURE, ACUTE: Status: RESOLVED | Noted: 2018-10-23 | Resolved: 2018-11-07

## 2018-11-07 PROCEDURE — 99214 OFFICE O/P EST MOD 30 MIN: CPT | Mod: S$PBB,,, | Performed by: PSYCHIATRY & NEUROLOGY

## 2018-11-07 PROCEDURE — 99214 OFFICE O/P EST MOD 30 MIN: CPT | Mod: S$PBB,,, | Performed by: INTERNAL MEDICINE

## 2018-11-07 PROCEDURE — 99999 PR PBB SHADOW E&M-EST. PATIENT-LVL III: CPT | Mod: PBBFAC,,, | Performed by: INTERNAL MEDICINE

## 2018-11-07 PROCEDURE — 99213 OFFICE O/P EST LOW 20 MIN: CPT | Mod: PBBFAC | Performed by: PSYCHIATRY & NEUROLOGY

## 2018-11-07 PROCEDURE — 99213 OFFICE O/P EST LOW 20 MIN: CPT | Mod: PBBFAC,27 | Performed by: INTERNAL MEDICINE

## 2018-11-07 PROCEDURE — 99999 PR PBB SHADOW E&M-EST. PATIENT-LVL III: CPT | Mod: PBBFAC,,, | Performed by: PSYCHIATRY & NEUROLOGY

## 2018-11-07 ASSESSMENT — ROUTINE ASSESSMENT OF PATIENT INDEX DATA (RAPID3)
AM STIFFNESS SCORE: 0, NO
FATIGUE SCORE: 0
PAIN SCORE: 0
PATIENT GLOBAL ASSESSMENT SCORE: 2

## 2018-11-07 NOTE — ASSESSMENT & PLAN NOTE
RA controlled on prednisone 40 mg plus baseline HCQ  PE today good and lungs sound clear  She is on Bactrim now    Plan:  Discuss with Dr Francis; ? Steroid taper 5-10 mg every month ?  Telephone f/u 2 weeks; anticipate adding SSZ for RA after she finishes Bactrim  Also will plan lab in 6 weeks including ESR and CRP to monitor disease activity   Also plan lab in 3 mo ESR, CRP, CBC, CMP and RTC me after

## 2018-11-07 NOTE — PROGRESS NOTES
Subjective:       Patient ID: Selma Rosado MD Jose J is a 83 y.o. female.    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)     TKP 2009 and 2014  CTS bilaterally surgery around 2011 2012 saw Dr No who recommended MTX ; she was afraid  Then saw Dr Qureshi; he Rx  plus prednisone 5 mg/d    April 2018 one injection of Humira and 2 weeks later admitted to ICU with resp infection; spent a month in ICU, then weeks in rehab; and home  end of June    Stroke summer 2017    Walks with a walker     After home in June she tapered prednisone from 30 mg to 15 mg by 5 mg increments; then developed dyspnea, had card and pulm evals; Dr Francis repeated CT chest that showed recurrent bilateral infiltrates; prednisone increase she says back up to 40 mg/d prednisone though I note that Dr Francis's note says 30 mg/d    Back on Bactrim once daily    On current prednisone a little bit of joint pain storm DIP joints  Takes tylenol maybe 1 or twice in a week  Rare norco and did not help last time  Baclofen and gabapentin TID and she says they help    Some numbness sx hands     Review of Systems   Constitutional: Negative for fever and unexpected weight change.   HENT: Negative for mouth sores and trouble swallowing.         No Dry mouth   Eyes: Negative for redness.        No Dry eyes   Respiratory: Negative for cough and shortness of breath.    Cardiovascular: Negative for chest pain.   Gastrointestinal: Negative for abdominal pain, constipation and diarrhea.   Genitourinary: Negative for dysuria and genital sores.   Skin: Negative for rash.   Neurological: Negative for headaches.   Hematological: Negative for adenopathy. Does not bruise/bleed easily.   Psychiatric/Behavioral: Negative for sleep disturbance.         Objective:   BP (!) 150/78   Pulse 74   LMP  (LMP Unknown)      Physical Exam   Constitutional: She is well-developed, well-nourished, and  in no distress.   HENT:   Mouth/Throat: Oropharynx is clear and moist.   Eyes: Conjunctivae are normal.   Cardiovascular: Normal rate and regular rhythm.    Pulmonary/Chest: She has no wheezes. She has no rales.   I do not hear crackles or wheezes   Lymphadenopathy:     She has no cervical adenopathy.   Skin: No rash noted.          Physical Exam     SHEIKH-28 tender joint count: 0  SHEIKH-28 swollen joint count: 0  ESR (mm/hr): 6  Patient global assessment: 20  SHEIKH-28 score: 1.53 (Remission)    Lab Results   Component Value Date    SEDRATE 6 11/06/2018       Assessment:       1. Seropositive rheumatoid arthritis of multiple sites    2. High risk medication use    3. Pneumonia of both lower lobes due to infectious organism            Plan:       Problem List Items Addressed This Visit        Active Problems    Pneumonia of both lungs due to infectious organism     Currently on prednisone and Bactrim for this    Not clear if this may be Rheumatoid lung disease but it started clinically after Humira injection, cleared with antibiotics and prednisone, then relapsed when prednisone tapered    I advised her to cont current prednisone while finishing antibiotics  I will communicate with Dr. Francis regarding steroid taper and repeat CT scan (Jan?)          Seropositive rheumatoid arthritis of multiple sites - Primary     RA controlled on prednisone 40 mg plus baseline HCQ  PE today good and lungs sound clear  She is on Bactrim now    Plan:  Discuss with Dr Francis; ? Steroid taper 5-10 mg every month ?  Telephone f/u 2 weeks; anticipate adding SSZ for RA after she finishes Bactrim  Also will plan lab in 6 weeks including ESR and CRP to monitor disease activity   Also plan lab in 3 mo ESR, CRP, CBC, CMP and RTC me after           Other Visit Diagnoses     High risk medication use        Relevant Orders    Comprehensive metabolic panel    CBC auto differential

## 2018-11-07 NOTE — PATIENT INSTRUCTIONS
Discuss with Dr Francis    Telephone report 2 weeks; anticipate adding Sulfasalazine for RA after  finish Bactrim    Also will plan lab in 6 weeks including ESR and CRP to monitor disease activity     Also plan lab in 3 mo ESR, CRP, CBC, CMP and RTC me after

## 2018-11-07 NOTE — ASSESSMENT & PLAN NOTE
Currently on prednisone and Bactrim for this    Not clear if this may be Rheumatoid lung disease but it started clinically after Humira injection, cleared with antibiotics and prednisone, then relapsed when prednisone tapered    I advised her to cont current prednisone while finishing antibiotics  I will communicate with Dr. Francis regarding steroid taper and repeat CT scan (Jan?)

## 2018-11-07 NOTE — PROGRESS NOTES
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    Chief Complaint:  Vascular dementia without behavioral disturbance      History of Present Illness  Having good and bad days. Daughter has been living with her since June 2018.Doing well physically. Transitioning from walker to the cane. Mom's mood is much better. Her anxiety has improved. No longer on high dose steroids. Has had some improvement in executive functions and now has problems with intermediate memory only.        Review of Systems  Review of Systems   Psychiatric/Behavioral: Positive for confusion and decreased concentration.       Objective:      Neurologic Exam     Mental Status   Oriented to person, place, and time.   Registration: recalls 3 of 3 objects. Recall at 5 minutes: recalls 3 of 3 objects. Follows 3 step commands.   Attention: normal. Concentration: normal.   Speech: speech is normal   Level of consciousness: alert  Knowledge: good and poor. Able to perform simple calculations.   Able to name object. Able to read. Able to repeat. Able to write.       Physical Exam   Constitutional: She is oriented to person, place, and time.   Neurological: She is oriented to person, place, and time.   Psychiatric: Her speech is normal.         Assessment:     Problem List Items Addressed This Visit        Neuro    Vascular dementia    Current Assessment & Plan     Doing well. Memory and mood are definitely improved. Meds are altered.               Plan:   RTC 6 months

## 2018-11-08 ENCOUNTER — OUTPATIENT CASE MANAGEMENT (OUTPATIENT)
Dept: ADMINISTRATIVE | Facility: OTHER | Age: 83
End: 2018-11-08

## 2018-11-08 NOTE — PROGRESS NOTES
11/8/18  Summary:  1:30 pm 2nd attempt to complete initial assessment in full for OPCM. Called and reached pt's daughter, Madhavi. Madhavi reports how she has been ill with PNA. She records phone number for RN NATALIA and says she will call back as she is on phone with a relative.   3:56 pm - attempted to reach pt/daughter to assess for needs that can be addressed by OPCM. Phone was picked up, no answer, clicked off. Repeat call reached a mail box that is full.     Interventions:  None.     Plan:  Make -- 3rd & last Attempt to complete initial assessment for Outpatient Care Management        Todays OPCM Self-Management Care Plan was developed with the patients/caregivers input and was based on identified barriers from todays assessment.  Goals were written today with the patient/caregiver and the patient has agreed to work towards these goals to improve his/her overall well-being. Patient verbalized understanding of the care plan, goals, and all of today's instructions. Encouraged patient/caregiver to communicate with his/her physician and health care team about health conditions and the treatment plan.  Provided my contact information today and encouraged patient/caregiver to call me with any questions as needed.

## 2018-11-09 ENCOUNTER — OFFICE VISIT (OUTPATIENT)
Dept: DERMATOLOGY | Facility: CLINIC | Age: 83
End: 2018-11-09
Payer: MEDICARE

## 2018-11-09 ENCOUNTER — OUTPATIENT CASE MANAGEMENT (OUTPATIENT)
Dept: ADMINISTRATIVE | Facility: OTHER | Age: 83
End: 2018-11-09

## 2018-11-09 DIAGNOSIS — L60.3 ONYCHODYSTROPHY: Primary | ICD-10-CM

## 2018-11-09 PROCEDURE — 87107 FUNGI IDENTIFICATION MOLD: CPT

## 2018-11-09 PROCEDURE — 87101 SKIN FUNGI CULTURE: CPT

## 2018-11-09 PROCEDURE — 87106 FUNGI IDENTIFICATION YEAST: CPT

## 2018-11-09 PROCEDURE — 99202 OFFICE O/P NEW SF 15 MIN: CPT | Mod: S$GLB,,, | Performed by: DERMATOLOGY

## 2018-11-09 NOTE — PROGRESS NOTES
11/9/18  Summary:  -- 3rd Attempt to complete initial assessment for Outpatient Care Management.  Called and spoke with pt's daughter, Susu Rosado in town from [place] to assist in pt's care.   Susu reports that all needs are being addressed. Pt has recovered significantly following resp distress/PNA/ICU care this past April. Hired help in home 16 hrs/week to help pt start her day. Susu reports plans to speak with  about plan benefits. Susu reports being pleased with the specialty physician team working with pt now: Pulm, Cardio, Rheum. Susu denies need for additional services from OPCM at this time and states she has the contact number for this RN CM/OPCM.     Interventions:  Listened to daughter's c/o that [facility] lacks seamless care and asks why a referral to OPCM did not happen following pt's ICU admit earlier this year?  Reviewed chart to see a referral was entered 5/3/18 however because pt was in SNF at the time, pt was not enrolled into OPCM. A new referral post SNF was not received.   Encouraged Susu to voice her concerns through the Patient Relations contact number. Susu reports already doing so.   Updated contact number for Susu in [employer].   In basket message to Dr. Deutsch that OPCM services declined.     Plan:  Referral closed.

## 2018-11-09 NOTE — LETTER
November 9, 2018      Other  [address] [place] [place] [address]           [facility] - Dermatology  [address] [place] [id number]  Phone: [phone]  Fax: [phone]          Patient: Selma Alonzo Lux MD   MR Number: [id number]   YOB: 1935   Date of Visit: 11/9/2018       Dear Other:    Thank you for referring Selma Lux to me for evaluation. Attached you will find relevant portions of my assessment and plan of care.    If you have questions, please do not hesitate to call me. I look forward to following Selma Lux along with you.    Sincerely,    Teri Ceballos MD    Enclosure  CC:  No Recipients    If you would like to receive this communication electronically, please contact [email or web address] or [phone] to request more information on [facility] access.    For providers and/or their staff who would like to refer a patient to [facility], please contact us through our one-stop-shop provider referral line, [facility], at [phone].    If you feel you have received this communication in error or would no longer like to receive these types of communications, please e-mail [email or web address]

## 2018-11-09 NOTE — PROGRESS NOTES
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 She was hospitalized for a month in April due to pneumonia, and she is concerned that her nails may have been the source of the pneumonia. She does not want to risk infecting someone else.    She also recalls hitting her left hand on something which caused a dark spot on her left 5th nail, but that has since grown out and cleared up.    Fungal culture of bronchial lavage from 4/11/18 grew Candida albicans (few).      Nail Problem  - Initial  Affected locations: left fingers  Duration: 10 weeks  Signs and Symptoms: discoloration.  Severity: mild  Timing: constant  Aggravated by: nothing  Treatments tried: OTC antifungal cream.  Improvement on treatment: mild      Review of Systems   Skin: Negative for itching and rash.        Objective:    Physical Exam   Constitutional: She appears well-developed and well-nourished. No distress.   HENT:   Mouth/Throat: Lips normal.    Eyes: Lids are normal.  No conjunctival no injection.   Neurological: She is alert and oriented to person, place, and time. She is not disoriented.   Psychiatric: She has a normal mood and affect.   Skin:   Areas Examined (abnormalities noted in diagram):   Head / Face Inspection Performed  Neck Inspection Performed  RLE Inspected  LLE Inspection Performed  Nails and Digits Inspection Performed             Diagram Legend     Erythematous scaling macule/papule c/w actinic keratosis       Vascular papule c/w angioma      Pigmented verrucoid papule/plaque c/w seborrheic keratosis      Yellow umbilicated papule c/w sebaceous hyperplasia      Irregularly shaped tan macule c/w lentigo     1-2 mm smooth white papules consistent with Milia      Movable subcutaneous cyst with punctum c/w epidermal  inclusion cyst      Subcutaneous movable cyst c/w pilar cyst      Firm pink to brown papule c/w dermatofibroma      Pedunculated fleshy papule(s) c/w skin tag(s)      Evenly pigmented macule c/w junctional nevus     Mildly variegated pigmented, slightly irregular-bordered macule c/w mildly atypical nevus      Flesh colored to evenly pigmented papule c/w intradermal nevus       Pink pearly papule/plaque c/w basal cell carcinoma      Erythematous hyperkeratotic cursted plaque c/w SCC      Surgical scar with no sign of skin cancer recurrence      Open and closed comedones      Inflammatory papules and pustules      Verrucoid papule consistent consistent with wart     Erythematous eczematous patches and plaques     Dystrophic onycholytic nail with subungual debris c/w onychomycosis     Umbilicated papule    Erythematous-base heme-crusted tan verrucoid plaque consistent with inflamed seborrheic keratosis     Erythematous Silvery Scaling Plaque c/w Psoriasis     See annotation      Assessment / Plan:        Onychodystrophy  -     Fungal culture, nails  -  Discussed that it can take up to a month for results to come back, however, I am doubtful that her fingernails are the source of her pulmonary issues.    Follow-up if symptoms worsen or fail to improve.

## 2018-11-12 ENCOUNTER — OFFICE VISIT (OUTPATIENT)
Dept: PULMONOLOGY | Facility: CLINIC | Age: 83
End: 2018-11-12
Payer: MEDICARE

## 2018-11-12 VITALS
DIASTOLIC BLOOD PRESSURE: 68 MMHG | HEIGHT: 62 IN | BODY MASS INDEX: 30.39 KG/M2 | HEART RATE: 75 BPM | SYSTOLIC BLOOD PRESSURE: 140 MMHG | WEIGHT: 165.13 LBS | OXYGEN SATURATION: 96 %

## 2018-11-12 DIAGNOSIS — Z79.52 LONG TERM (CURRENT) USE OF SYSTEMIC STEROIDS: Primary | ICD-10-CM

## 2018-11-12 DIAGNOSIS — R93.89 ABNORMAL CHEST CT: ICD-10-CM

## 2018-11-12 PROCEDURE — 99999 PR PBB SHADOW E&M-EST. PATIENT-LVL III: CPT | Mod: PBBFAC,,, | Performed by: INTERNAL MEDICINE

## 2018-11-12 PROCEDURE — 99213 OFFICE O/P EST LOW 20 MIN: CPT | Mod: S$PBB,,, | Performed by: INTERNAL MEDICINE

## 2018-11-12 PROCEDURE — 99213 OFFICE O/P EST LOW 20 MIN: CPT | Mod: PBBFAC | Performed by: INTERNAL MEDICINE

## 2018-11-12 NOTE — PATIENT INSTRUCTIONS
· Reduce prednisone by 5 mg daily every 3 weeks.    · Return appointment to be set up for ~6 weeks from now.

## 2018-11-15 DIAGNOSIS — G24.3 CERVICAL DYSTONIA: Primary | ICD-10-CM

## 2018-11-20 ENCOUNTER — LAB VISIT (OUTPATIENT)
Dept: LAB | Facility: HOSPITAL | Age: 83
End: 2018-11-20
Attending: INTERNAL MEDICINE
Payer: MEDICARE

## 2018-11-20 DIAGNOSIS — Z79.899 HIGH RISK MEDICATION USE: ICD-10-CM

## 2018-11-20 LAB
ALBUMIN SERPL BCP-MCNC: 3.5 G/DL
ALP SERPL-CCNC: 49 U/L
ALT SERPL W/O P-5'-P-CCNC: 10 U/L
ANION GAP SERPL CALC-SCNC: 6 MMOL/L
AST SERPL-CCNC: 10 U/L
BASOPHILS # BLD AUTO: 0.02 K/UL
BASOPHILS NFR BLD: 0.2 %
BILIRUB SERPL-MCNC: 0.7 MG/DL
BUN SERPL-MCNC: 23 MG/DL
CALCIUM SERPL-MCNC: 8.9 MG/DL
CHLORIDE SERPL-SCNC: 100 MMOL/L
CO2 SERPL-SCNC: 30 MMOL/L
CREAT SERPL-MCNC: 1.3 MG/DL
DIFFERENTIAL METHOD: ABNORMAL
EOSINOPHIL # BLD AUTO: 0.1 K/UL
EOSINOPHIL NFR BLD: 0.6 %
ERYTHROCYTE [DISTWIDTH] IN BLOOD BY AUTOMATED COUNT: 14.8 %
EST. GFR  (AFRICAN AMERICAN): 44.5 ML/MIN/1.73 M^2
EST. GFR  (NON AFRICAN AMERICAN): 38.6 ML/MIN/1.73 M^2
GLUCOSE SERPL-MCNC: 175 MG/DL
HCT VFR BLD AUTO: 40.7 %
HGB BLD-MCNC: 12.6 G/DL
IMM GRANULOCYTES # BLD AUTO: 0.14 K/UL
IMM GRANULOCYTES NFR BLD AUTO: 1.1 %
LYMPHOCYTES # BLD AUTO: 1.5 K/UL
LYMPHOCYTES NFR BLD: 11.9 %
MCH RBC QN AUTO: 28.3 PG
MCHC RBC AUTO-ENTMCNC: 31 G/DL
MCV RBC AUTO: 92 FL
MONOCYTES # BLD AUTO: 1.5 K/UL
MONOCYTES NFR BLD: 11.9 %
NEUTROPHILS # BLD AUTO: 9.6 K/UL
NEUTROPHILS NFR BLD: 74.3 %
NRBC BLD-RTO: 0 /100 WBC
PLATELET # BLD AUTO: 143 K/UL
PMV BLD AUTO: 10.3 FL
POTASSIUM SERPL-SCNC: 4.7 MMOL/L
PROT SERPL-MCNC: 6.4 G/DL
RBC # BLD AUTO: 4.45 M/UL
SODIUM SERPL-SCNC: 136 MMOL/L
WBC # BLD AUTO: 12.92 K/UL

## 2018-11-20 PROCEDURE — 36415 COLL VENOUS BLD VENIPUNCTURE: CPT

## 2018-11-20 PROCEDURE — 80053 COMPREHEN METABOLIC PANEL: CPT

## 2018-11-20 PROCEDURE — 85025 COMPLETE CBC W/AUTO DIFF WBC: CPT

## 2018-11-23 ENCOUNTER — PATIENT MESSAGE (OUTPATIENT)
Dept: NEUROLOGY | Facility: CLINIC | Age: 83
End: 2018-11-23

## 2018-11-23 PROBLEM — R93.89 ABNORMAL CHEST CT: Status: ACTIVE | Noted: 2018-11-23

## 2018-11-23 NOTE — PROGRESS NOTES
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  Following most recent visit with me on 10/17/2018, she was admitted to [facility] for work up of bilateral infiltrates on CXR on 10/23.  While hospitalized, she again refused/deferred bronchoscopy.  Serologic and microbiologic work up remains negative to date.  She was discharged on 10/26 to continue with TMP-SMX, along with the present steroid dose.  Since that time, she was seen by Dr. Rouse in Rheumatology who suggested a slow steroid taper and addition of a second agent once she completes the TMP-SMX.    Since I saw her ~3 weeks ago, Dr. Lux has noted gradual improvement in her activity tolerance.  Although she still gets short of breath with exertion such as walking from the parking garage to clinic, she has been able to perform more daily activities around the house without limitations.  She denies fever, chills, cough, hemoptysis, sputum production, pleurisy, or chest pain.  She has noticed less leg swelling even on the reduced dose of furosemide.  She is accompanied by her daughter at this visit who confirms her improvement.    She remains on prednisone 40 mg daily.    Review of Systems   Constitutional: Positive for fatigue. Negative for fever, activity change, appetite change and night sweats.   Respiratory: Positive for dyspnea on extertion. Negative for cough, hemoptysis, sputum production, wheezing, orthopnea and asthma nighttime symptoms.    Cardiovascular: Negative for chest pain and leg swelling.       Objective:      Physical Exam   Constitutional: She is oriented to person, place, and time.   Cardiovascular: Normal rate and regular rhythm.   Pulmonary/Chest: Effort normal and breath sounds normal. No respiratory distress. She has no decreased breath sounds. She has no rhonchi. She  has no rales.   SpO2 96% at rest on room air.   Musculoskeletal: She exhibits no edema.   Neurological: She is alert and oriented to person, place, and time.   Nursing note and vitals reviewed.    Personal Diagnostic Review    CXR and Chest CT from 10/24 were personally reviewed by me.  Findings are comparable to the CXR from 10/5/2018 prior to steroid treatment.    Results for DR.BACON BAMBI HERNANDEZ MD (MRN [id number])    Ref. Range 10/8/2018 15:23 10/23/2018 23:42 11/6/2018 14:24   Sed Rate Latest Ref Range: 0 - 36 mm/Hr 72 (H) 16 6      Ref. Range 10/8/2018 15:23 10/23/2018 23:42 11/6/2018 14:24   CRP Latest Ref Range: 0.0 - 8.2 mg/L 179.7 (H) 20.6 (H) 7.4   BNP Latest Ref Range: 0 - 99 pg/mL  113 (H)        Assessment:       1. Long term (current) use of systemic steroids    2. Abnormal chest CT        Outpatient Encounter Medications as of 11/12/2018   Medication Sig Dispense Refill    acetaminophen (TYLENOL) 500 MG tablet Take 1,000 mg by mouth daily as needed for Pain.      aspirin (ECOTRIN) 81 MG EC tablet Take 81 mg by mouth once daily.      baclofen (LIORESAL) 10 MG tablet Take 1 tablet (10 mg total) by mouth 3 (three) times daily. 270 tablet 3    carvedilol (COREG) 12.5 MG tablet Take 1 tablet (12.5 mg total) by mouth 2 (two) times daily with meals. 180 tablet 3    clopidogrel (PLAVIX) 75 mg tablet Take 75 mg by mouth once daily.      diazePAM (VALIUM) 2 MG tablet Take 2 mg by mouth as needed for Anxiety.      furosemide (LASIX) 40 MG tablet Take 20 mg by mouth once daily.      gabapentin (NEURONTIN) 300 MG capsule Take 1 capsule (300 mg total) by mouth 3 (three) times daily. 90 capsule 2    hydroxychloroquine (PLAQUENIL) 200 mg tablet Take 2 tablets (400 mg total) by mouth once daily. 60 tablet 2    magnesium oxide (MAG-OX) 400 mg tablet Take 400 mg by mouth once daily.      montelukast (SINGULAIR) 10 mg tablet Take 1 tablet (10 mg total) by mouth every evening. 90 tablet 3    pantoprazole  (PROTONIX) 40 mg GrPS 1 packet (40 mg total) by Per G Tube route once daily. (Patient taking differently: Take 20 mg by mouth once daily. ) 30 packet 11    potassium chloride SA (K-DUR,KLOR-CON) 10 MEQ tablet Take 2 tablets (20 mEq total) by mouth once daily. 60 tablet 3    predniSONE (DELTASONE) 20 MG tablet Take 2 tablets (40 mg total) by mouth once daily. 60 tablet 0    sulfamethoxazole-trimethoprim 800-160mg (BACTRIM DS) 800-160 mg Tab Take 1 tablet by mouth once daily. 30 tablet 0     No facility-administered encounter medications on file as of 11/12/2018.      No orders of the defined types were placed in this encounter.      Plan:   Dr. 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    Problem List Items Addressed This Visit     Abnormal chest CT    Current Assessment & Plan     Recurring infiltrates on CXR in the setting of rheumatoid arthritis.  Prior work up for infection has been negative and the clinical picture is less consistent with acute infection.  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  · Wean prednisone gradually by ~5 mg every couple weeks.  · Dr. Rouse (Rheumatology) to add second RA agent.  (Avoiding biologics given prior flare after infliximab.)  · Repeat imaging in early 2019         Long term (current) use of systemic steroids - Primary    Overview     · Has taken prednisone 5 mg/d since 2012 when dx'd with RA.  · Recently tapering down slowly after April hospitalization.             · Reduce prednisone by 5 mg daily every 3 weeks.    · Return appointment to be set up for ~6 weeks from now.    Baldomero Francis MD  Pulmonary/Critical Care Medicine

## 2018-11-23 NOTE — ASSESSMENT & PLAN NOTE
Recurring infiltrates on CXR in the setting of rheumatoid arthritis.  Prior work up for infection has been negative and the clinical picture is less consistent with acute infection.  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  · Wean prednisone gradually by ~5 mg every couple weeks.  · Dr. Rouse (Rheumatology) to add second RA agent.  (Avoiding biologics given prior flare after infliximab.)  · Repeat imaging in early 2019

## 2018-11-26 ENCOUNTER — PROCEDURE VISIT (OUTPATIENT)
Dept: NEUROLOGY | Facility: CLINIC | Age: 83
End: 2018-11-26
Payer: MEDICARE

## 2018-11-26 VITALS
DIASTOLIC BLOOD PRESSURE: 65 MMHG | BODY MASS INDEX: 30.59 KG/M2 | HEART RATE: 76 BPM | WEIGHT: 166.25 LBS | HEIGHT: 62 IN | SYSTOLIC BLOOD PRESSURE: 129 MMHG

## 2018-11-26 DIAGNOSIS — G24.3 CERVICAL DYSTONIA: Primary | ICD-10-CM

## 2018-11-26 PROCEDURE — 64616 CHEMODENERV MUSC NECK DYSTON: CPT | Mod: 50,S$PBB,, | Performed by: PSYCHIATRY & NEUROLOGY

## 2018-11-26 PROCEDURE — 99499 UNLISTED E&M SERVICE: CPT | Mod: S$PBB,,, | Performed by: PSYCHIATRY & NEUROLOGY

## 2018-11-26 PROCEDURE — 64616 CHEMODENERV MUSC NECK DYSTON: CPT | Mod: 50,PBBFAC | Performed by: PSYCHIATRY & NEUROLOGY

## 2018-11-26 RX ORDER — DIAZEPAM 2 MG/1
2 TABLET ORAL
Qty: 30 TABLET | Refills: 5 | Status: SHIPPED | OUTPATIENT
Start: 2018-11-26 | End: 2019-06-20 | Stop reason: SDUPTHER

## 2018-11-26 NOTE — PROGRESS NOTES
BOTULINUM TOXIN PROCEDURE NOTE FOR CERVICAL DYSTONIA/ SPASMODIC TORTICOLLIS      Diagnosis: CERVICAL DYSTONIA/ SPASMODIC TORTICOLLIS  She has recurrence of pain, spasm and abnormal posturing over the past several weeks. She is experiencing a worsening of cervical dystonia/ spasmodic torticollis which interferes with activities of daily living (ADL).     I injected Botox in August, had 50% improvement in pain and some improvement lifting her head up and keeping comfortable    Last week, had Dr. Thomson press on her SCM and pain was worse.  Neck pain remains chronic there.    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  Given the focal nature of increased tone and poor response to oral medications, She is a good candidate for botulinum injection to her neck.  She will need 200 units of toxin    The goal of the injections will be to reduce pain and abnormal posturing.  The procedure was explained to patient and a consent form was signed.      BOTOX INJECTION PROCEDURE:    Using a 30 gauge injection needle and aseptic technique the following muscles were identified and injected with botox .     Dilution: 100:1      For cervical dystonia/ spasmodic torticollis:      RIGHT    Muscle group Units given # injection sites   Splenius capitus  25    Levator scapulae     Upper trapezius     Sternocleidomastoid  25    Scalenus anticus      Scalenus medius     Scalenus posterior     Longissimus capitus      rhomboid                         LEFT        Muscle group Units given # injection sites   Splenius capitus  25    Levator scapulae     Upper trapezius     Sternocleidomastoid  50    Scalenus anticus      Scalenus medius     Scalenus posterior     Longissimus capitus      rhomboid                    Total amount of botox used:  125 units  Total amount of botox wasted:  75 units    Dr. Branden Rubio assisted and injected the R splenius capitus as part of his training with  me.    Patient tolerated the procedure without any difficulty and there were no complications.

## 2018-11-26 NOTE — TELEPHONE ENCOUNTER
----- Message from Puneet Garcia sent at 11/26/2018  3:13 PM CST -----  Rx Refill/Request     Is this a Refill or New Rx:  Refill  Rx Name and Strength: diazePAM (VALIUM) 2 MG tablet    Preferred Pharmacy with phone number: see below  Communication Preference: Selma Rosado @ [phone]  Additional Information:     [id number] - [place] - [address] AT [facility] [address]  [address]  [facility] [address]  Phone: [phone] Fax: [phone]

## 2018-11-27 RX ADMIN — ONABOTULINUMTOXINA 200 UNITS: 100 INJECTION, POWDER, LYOPHILIZED, FOR SOLUTION INTRADERMAL; INTRAMUSCULAR at 03:11

## 2018-11-28 NOTE — TELEPHONE ENCOUNTER
----- Message from Puneet Garcia sent at 11/27/2018  4:52 PM CST -----  Needs Advice    Reason for call: Selma is asking to speak w/ the doctor about rehab. Selma would give no further info.        Communication Preference: Selma Rosado @ [phone]     Additional Information:

## 2018-11-28 NOTE — TELEPHONE ENCOUNTER
Returned call to daughter and she would not give any information and wants to talk to Dr. Giang only. Will forward message

## 2018-11-29 ENCOUNTER — OFFICE VISIT (OUTPATIENT)
Dept: INTERNAL MEDICINE | Facility: CLINIC | Age: 83
End: 2018-11-29
Payer: MEDICARE

## 2018-11-29 ENCOUNTER — HOSPITAL ENCOUNTER (OUTPATIENT)
Dept: RADIOLOGY | Facility: HOSPITAL | Age: 83
Discharge: HOME | End: 2018-11-29
Attending: PSYCHIATRY & NEUROLOGY
Payer: MEDICARE

## 2018-11-29 ENCOUNTER — TELEPHONE (OUTPATIENT)
Dept: DERMATOLOGY | Facility: CLINIC | Age: 83
End: 2018-11-29

## 2018-11-29 VITALS
DIASTOLIC BLOOD PRESSURE: 90 MMHG | HEIGHT: 62 IN | HEART RATE: 77 BPM | BODY MASS INDEX: 31.25 KG/M2 | WEIGHT: 169.81 LBS | SYSTOLIC BLOOD PRESSURE: 134 MMHG

## 2018-11-29 DIAGNOSIS — Z12.31 ENCOUNTER FOR SCREENING MAMMOGRAM FOR BREAST CANCER: ICD-10-CM

## 2018-11-29 DIAGNOSIS — Z79.52 LONG TERM (CURRENT) USE OF SYSTEMIC STEROIDS: ICD-10-CM

## 2018-11-29 DIAGNOSIS — R73.09 ELEVATED RANDOM BLOOD GLUCOSE LEVEL: ICD-10-CM

## 2018-11-29 DIAGNOSIS — F01.50 VASCULAR DEMENTIA WITHOUT BEHAVIORAL DISTURBANCE: ICD-10-CM

## 2018-11-29 DIAGNOSIS — M81.0 OSTEOPOROSIS WITHOUT PATHOLOGICAL FRACTURE: ICD-10-CM

## 2018-11-29 DIAGNOSIS — I11.9 HYPERTENSIVE HEART DISEASE WITHOUT HEART FAILURE: Primary | ICD-10-CM

## 2018-11-29 DIAGNOSIS — B35.1 ONYCHOMYCOSIS: Primary | ICD-10-CM

## 2018-11-29 PROCEDURE — 99999 PR PBB SHADOW E&M-EST. PATIENT-LVL III: CPT | Mod: PBBFAC,,, | Performed by: INTERNAL MEDICINE

## 2018-11-29 PROCEDURE — 70551 MRI BRAIN STEM W/O DYE: CPT | Mod: TC

## 2018-11-29 PROCEDURE — 99213 OFFICE O/P EST LOW 20 MIN: CPT | Mod: PBBFAC,25 | Performed by: INTERNAL MEDICINE

## 2018-11-29 PROCEDURE — 99214 OFFICE O/P EST MOD 30 MIN: CPT | Mod: S$PBB,,, | Performed by: INTERNAL MEDICINE

## 2018-11-29 PROCEDURE — 70551 MRI BRAIN STEM W/O DYE: CPT | Mod: 26,,, | Performed by: RADIOLOGY

## 2018-11-29 RX ORDER — CICLOPIROX 80 MG/ML
SOLUTION TOPICAL
Qty: 1 BOTTLE | Refills: 11 | Status: ON HOLD | OUTPATIENT
Start: 2018-11-29 | End: 2019-03-14

## 2018-11-29 NOTE — TELEPHONE ENCOUNTER
L/M on pt.'s cell. Culture results given per Dr. Ceballos. Informed her a new rx for the yeast on her nails was sent to her pharmacy.      ----- Message from Teri Ceballos MD sent at 11/29/2018  9:20 AM CST -----  Please inform Dr. Lux that her nail culture has started to grow the yeast CANDIDA ALBICANS. I will call in a topical antifungal for her to use on her nails.  KK

## 2018-11-30 ENCOUNTER — EXTERNAL CHRONIC CARE MANAGEMENT (OUTPATIENT)
Dept: PRIMARY CARE CLINIC | Facility: CLINIC | Age: 83
End: 2018-11-30
Payer: MEDICARE

## 2018-11-30 PROCEDURE — 99490 CHRNC CARE MGMT STAFF 1ST 20: CPT | Mod: PBBFAC | Performed by: FAMILY MEDICINE

## 2018-11-30 PROCEDURE — 99490 CHRNC CARE MGMT STAFF 1ST 20: CPT | Mod: S$PBB,,, | Performed by: FAMILY MEDICINE

## 2018-12-06 RX ORDER — OMEPRAZOLE 20 MG/1
CAPSULE, DELAYED RELEASE ORAL
Qty: 90 CAPSULE | Refills: 0 | Status: SHIPPED | OUTPATIENT
Start: 2018-12-06 | End: 2019-02-20 | Stop reason: SDUPTHER

## 2018-12-07 NOTE — TELEPHONE ENCOUNTER
Please let her know that I sent omeprazole to her mail order pharmacy    She should not be taking pantoprazole as well, she needs to stop that (Protonix) thanks

## 2018-12-11 LAB — FUNGUS BLD CULT: NORMAL

## 2018-12-14 ENCOUNTER — TELEPHONE (OUTPATIENT)
Dept: RHEUMATOLOGY | Facility: CLINIC | Age: 83
End: 2018-12-14

## 2018-12-14 DIAGNOSIS — M05.79 SEROPOSITIVE RHEUMATOID ARTHRITIS OF MULTIPLE SITES: Primary | ICD-10-CM

## 2018-12-14 RX ORDER — SULFASALAZINE 500 MG/1
1000 TABLET, DELAYED RELEASE ORAL 2 TIMES DAILY
Qty: 120 TABLET | Refills: 2 | Status: SHIPPED | OUTPATIENT
Start: 2018-12-14 | End: 2019-02-11 | Stop reason: SDUPTHER

## 2018-12-14 RX ORDER — PREDNISONE 5 MG/1
5 TABLET ORAL DAILY
Qty: 60 TABLET | Refills: 2 | Status: ON HOLD | OUTPATIENT
Start: 2018-12-14 | End: 2018-12-31 | Stop reason: SDUPTHER

## 2018-12-14 NOTE — TELEPHONE ENCOUNTER
----- Message from Kayleen Hernandez sent at 12/14/2018 12:44 PM CST -----  Contact: Self  Requesting refill on predniSONE (DELTASONE) 5 MG tablet     [id number] - [place] - [address] AT [facility] & [address]  [address]  [facility] [place] [address]  Phone: [phone] Fax: [phone]    Also says when patient was below 5mg there was supposed to be another medication added, would like a call to discuss.  Can be reached at [phone]

## 2018-12-16 ENCOUNTER — HOSPITAL ENCOUNTER (INPATIENT)
Facility: HOSPITAL | Age: 83
LOS: 12 days | Discharge: NURSING, REHAB OR LONG-TERM CARE | DRG: 097 | End: 2018-12-31
Attending: EMERGENCY MEDICINE | Admitting: INTERNAL MEDICINE
Payer: MEDICARE

## 2018-12-16 DIAGNOSIS — R41.0 CONFUSION: ICD-10-CM

## 2018-12-16 DIAGNOSIS — I49.3 PVC'S (PREMATURE VENTRICULAR CONTRACTIONS): ICD-10-CM

## 2018-12-16 DIAGNOSIS — I10 HYPERTENSION, ESSENTIAL: ICD-10-CM

## 2018-12-16 DIAGNOSIS — I16.0 HYPERTENSIVE URGENCY: ICD-10-CM

## 2018-12-16 DIAGNOSIS — G93.40 ACUTE ENCEPHALOPATHY: ICD-10-CM

## 2018-12-16 DIAGNOSIS — M05.79 SEROPOSITIVE RHEUMATOID ARTHRITIS OF MULTIPLE SITES: ICD-10-CM

## 2018-12-16 DIAGNOSIS — R00.0 TACHYCARDIA: ICD-10-CM

## 2018-12-16 DIAGNOSIS — R41.82 ALTERED MENTAL STATUS: Primary | ICD-10-CM

## 2018-12-16 DIAGNOSIS — I67.4 HYPERTENSIVE ENCEPHALOPATHY: ICD-10-CM

## 2018-12-16 DIAGNOSIS — I49.8 BIGEMINAL RHYTHM: ICD-10-CM

## 2018-12-16 DIAGNOSIS — F05 DELIRIUM SUPERIMPOSED ON DEMENTIA: ICD-10-CM

## 2018-12-16 PROBLEM — D69.6 THROMBOCYTOPENIA: Status: ACTIVE | Noted: 2018-12-16

## 2018-12-16 PROBLEM — N18.30 CKD (CHRONIC KIDNEY DISEASE) STAGE 3, GFR 30-59 ML/MIN: Status: ACTIVE | Noted: 2018-12-16

## 2018-12-16 PROBLEM — G24.9 DYSTONIA: Status: ACTIVE | Noted: 2018-12-16

## 2018-12-16 PROBLEM — Z79.60 LONG-TERM USE OF IMMUNOSUPPRESSANT MEDICATION: Status: ACTIVE | Noted: 2018-12-16

## 2018-12-16 PROBLEM — M54.2 NECK PAIN: Status: RESOLVED | Noted: 2018-03-02 | Resolved: 2018-12-16

## 2018-12-16 PROBLEM — J18.9 PNEUMONIA OF BOTH LUNGS DUE TO INFECTIOUS ORGANISM: Status: RESOLVED | Noted: 2018-04-11 | Resolved: 2018-12-16

## 2018-12-16 PROBLEM — E83.42 HYPOMAGNESEMIA: Status: RESOLVED | Noted: 2018-10-24 | Resolved: 2018-12-16

## 2018-12-16 PROBLEM — R06.03 ACUTE RESPIRATORY DISTRESS: Status: RESOLVED | Noted: 2018-10-12 | Resolved: 2018-12-16

## 2018-12-16 PROBLEM — I13.10 BENIGN HYPERTENSIVE HEART AND KIDNEY DISEASE WITH CHRONIC KIDNEY DISEASE: Status: ACTIVE | Noted: 2018-12-16

## 2018-12-16 LAB
ALBUMIN SERPL BCP-MCNC: 3.6 G/DL
ALP SERPL-CCNC: 48 U/L
ALT SERPL W/O P-5'-P-CCNC: 12 U/L
ANION GAP SERPL CALC-SCNC: 14 MMOL/L
AST SERPL-CCNC: 15 U/L
BASOPHILS # BLD AUTO: 0.02 K/UL
BASOPHILS NFR BLD: 0.2 %
BILIRUB SERPL-MCNC: 0.8 MG/DL
BILIRUB UR QL STRIP: NEGATIVE
BNP SERPL-MCNC: 302 PG/ML
BUN SERPL-MCNC: 18 MG/DL
CALCIUM SERPL-MCNC: 9.6 MG/DL
CHLORIDE SERPL-SCNC: 103 MMOL/L
CLARITY UR REFRACT.AUTO: CLEAR
CO2 SERPL-SCNC: 25 MMOL/L
COLOR UR AUTO: COLORLESS
CREAT SERPL-MCNC: 1.1 MG/DL
DIFFERENTIAL METHOD: ABNORMAL
EOSINOPHIL # BLD AUTO: 0.1 K/UL
EOSINOPHIL NFR BLD: 0.4 %
ERYTHROCYTE [DISTWIDTH] IN BLOOD BY AUTOMATED COUNT: 14.5 %
EST. GFR  (AFRICAN AMERICAN): 54.4 ML/MIN/1.73 M^2
EST. GFR  (NON AFRICAN AMERICAN): 47.2 ML/MIN/1.73 M^2
GLUCOSE SERPL-MCNC: 122 MG/DL
GLUCOSE UR QL STRIP: NEGATIVE
HCT VFR BLD AUTO: 42.5 %
HGB BLD-MCNC: 13.2 G/DL
HGB UR QL STRIP: NEGATIVE
IMM GRANULOCYTES # BLD AUTO: 0.06 K/UL
IMM GRANULOCYTES NFR BLD AUTO: 0.5 %
KETONES UR QL STRIP: NEGATIVE
LEUKOCYTE ESTERASE UR QL STRIP: NEGATIVE
LYMPHOCYTES # BLD AUTO: 1.1 K/UL
LYMPHOCYTES NFR BLD: 9 %
MAGNESIUM SERPL-MCNC: 2.1 MG/DL
MCH RBC QN AUTO: 28.2 PG
MCHC RBC AUTO-ENTMCNC: 31.1 G/DL
MCV RBC AUTO: 91 FL
MONOCYTES # BLD AUTO: 1.3 K/UL
MONOCYTES NFR BLD: 10.9 %
NEUTROPHILS # BLD AUTO: 9.6 K/UL
NEUTROPHILS NFR BLD: 79 %
NITRITE UR QL STRIP: NEGATIVE
NRBC BLD-RTO: 0 /100 WBC
PH UR STRIP: 8 [PH] (ref 5–8)
PLATELET # BLD AUTO: 148 K/UL
PMV BLD AUTO: 10.3 FL
POTASSIUM SERPL-SCNC: 3.9 MMOL/L
PROT SERPL-MCNC: 6.8 G/DL
PROT UR QL STRIP: NEGATIVE
RBC # BLD AUTO: 4.68 M/UL
SODIUM SERPL-SCNC: 142 MMOL/L
SP GR UR STRIP: 1 (ref 1–1.03)
T4 FREE SERPL-MCNC: 1.07 NG/DL
TROPONIN I SERPL DL<=0.01 NG/ML-MCNC: 0.01 NG/ML
TROPONIN I SERPL DL<=0.01 NG/ML-MCNC: 0.03 NG/ML
TSH SERPL DL<=0.005 MIU/L-ACNC: 0.33 UIU/ML
URN SPEC COLLECT METH UR: ABNORMAL
WBC # BLD AUTO: 12.15 K/UL

## 2018-12-16 PROCEDURE — 25000003 PHARM REV CODE 250: Performed by: HOSPITALIST

## 2018-12-16 PROCEDURE — 99284 EMERGENCY DEPT VISIT MOD MDM: CPT | Mod: ,,, | Performed by: EMERGENCY MEDICINE

## 2018-12-16 PROCEDURE — 84484 ASSAY OF TROPONIN QUANT: CPT | Mod: 91

## 2018-12-16 PROCEDURE — G0378 HOSPITAL OBSERVATION PER HR: HCPCS

## 2018-12-16 PROCEDURE — 36415 COLL VENOUS BLD VENIPUNCTURE: CPT

## 2018-12-16 PROCEDURE — 96376 TX/PRO/DX INJ SAME DRUG ADON: CPT

## 2018-12-16 PROCEDURE — 99220 PR INITIAL OBSERVATION CARE,LEVL III: CPT | Mod: ,,, | Performed by: HOSPITALIST

## 2018-12-16 PROCEDURE — 25000003 PHARM REV CODE 250: Performed by: PHYSICIAN ASSISTANT

## 2018-12-16 PROCEDURE — 85025 COMPLETE CBC W/AUTO DIFF WBC: CPT

## 2018-12-16 PROCEDURE — 81003 URINALYSIS AUTO W/O SCOPE: CPT

## 2018-12-16 PROCEDURE — 93010 EKG 12-LEAD: ICD-10-PCS | Mod: ,,, | Performed by: INTERNAL MEDICINE

## 2018-12-16 PROCEDURE — 93010 ELECTROCARDIOGRAM REPORT: CPT | Mod: ,,, | Performed by: INTERNAL MEDICINE

## 2018-12-16 PROCEDURE — 99284 PR EMERGENCY DEPT VISIT,LEVEL IV: ICD-10-PCS | Mod: ,,, | Performed by: EMERGENCY MEDICINE

## 2018-12-16 PROCEDURE — 99220 PR INITIAL OBSERVATION CARE,LEVL III: ICD-10-PCS | Mod: ,,, | Performed by: HOSPITALIST

## 2018-12-16 PROCEDURE — 84484 ASSAY OF TROPONIN QUANT: CPT

## 2018-12-16 PROCEDURE — 83735 ASSAY OF MAGNESIUM: CPT

## 2018-12-16 PROCEDURE — 83880 ASSAY OF NATRIURETIC PEPTIDE: CPT

## 2018-12-16 PROCEDURE — 99285 EMERGENCY DEPT VISIT HI MDM: CPT | Mod: 25

## 2018-12-16 PROCEDURE — 93005 ELECTROCARDIOGRAM TRACING: CPT

## 2018-12-16 PROCEDURE — 84439 ASSAY OF FREE THYROXINE: CPT

## 2018-12-16 PROCEDURE — 84443 ASSAY THYROID STIM HORMONE: CPT

## 2018-12-16 PROCEDURE — 96374 THER/PROPH/DIAG INJ IV PUSH: CPT

## 2018-12-16 PROCEDURE — 80053 COMPREHEN METABOLIC PANEL: CPT

## 2018-12-16 RX ORDER — AMOXICILLIN 250 MG
1 CAPSULE ORAL 2 TIMES DAILY PRN
Status: DISCONTINUED | OUTPATIENT
Start: 2018-12-16 | End: 2018-12-19

## 2018-12-16 RX ORDER — ONDANSETRON 8 MG/1
8 TABLET, ORALLY DISINTEGRATING ORAL EVERY 8 HOURS PRN
Status: DISCONTINUED | OUTPATIENT
Start: 2018-12-16 | End: 2018-12-19

## 2018-12-16 RX ORDER — CARVEDILOL 12.5 MG/1
12.5 TABLET ORAL 2 TIMES DAILY WITH MEALS
Status: DISCONTINUED | OUTPATIENT
Start: 2018-12-16 | End: 2018-12-17

## 2018-12-16 RX ORDER — LABETALOL HCL 20 MG/4 ML
20 SYRINGE (ML) INTRAVENOUS
Status: DISCONTINUED | OUTPATIENT
Start: 2018-12-16 | End: 2018-12-16

## 2018-12-16 RX ORDER — ACETAMINOPHEN 325 MG/1
650 TABLET ORAL EVERY 8 HOURS PRN
Status: DISCONTINUED | OUTPATIENT
Start: 2018-12-16 | End: 2018-12-19

## 2018-12-16 RX ORDER — GLUCAGON 1 MG
1 KIT INJECTION
Status: DISCONTINUED | OUTPATIENT
Start: 2018-12-16 | End: 2018-12-31 | Stop reason: HOSPADM

## 2018-12-16 RX ORDER — BACLOFEN 10 MG/1
10 TABLET ORAL 3 TIMES DAILY
Status: DISCONTINUED | OUTPATIENT
Start: 2018-12-16 | End: 2018-12-19

## 2018-12-16 RX ORDER — SULFAMETHOXAZOLE AND TRIMETHOPRIM 800; 160 MG/1; MG/1
1 TABLET ORAL DAILY
Status: DISCONTINUED | OUTPATIENT
Start: 2018-12-17 | End: 2018-12-19

## 2018-12-16 RX ORDER — DEXTROSE 50 % IN WATER (D50W) INTRAVENOUS SYRINGE
25
Status: DISCONTINUED | OUTPATIENT
Start: 2018-12-16 | End: 2018-12-31 | Stop reason: HOSPADM

## 2018-12-16 RX ORDER — RAMELTEON 8 MG/1
8 TABLET ORAL NIGHTLY PRN
Status: DISCONTINUED | OUTPATIENT
Start: 2018-12-16 | End: 2018-12-19

## 2018-12-16 RX ORDER — IBUPROFEN 200 MG
16 TABLET ORAL
Status: DISCONTINUED | OUTPATIENT
Start: 2018-12-16 | End: 2018-12-19

## 2018-12-16 RX ORDER — SODIUM CHLORIDE 0.9 % (FLUSH) 0.9 %
5 SYRINGE (ML) INJECTION
Status: DISCONTINUED | OUTPATIENT
Start: 2018-12-16 | End: 2018-12-31 | Stop reason: HOSPADM

## 2018-12-16 RX ORDER — CARVEDILOL 12.5 MG/1
12.5 TABLET ORAL 2 TIMES DAILY WITH MEALS
Status: DISCONTINUED | OUTPATIENT
Start: 2018-12-17 | End: 2018-12-16

## 2018-12-16 RX ORDER — ENOXAPARIN SODIUM 100 MG/ML
40 INJECTION SUBCUTANEOUS EVERY 24 HOURS
Status: DISCONTINUED | OUTPATIENT
Start: 2018-12-16 | End: 2018-12-19

## 2018-12-16 RX ORDER — SULFASALAZINE 500 MG/1
1000 TABLET, DELAYED RELEASE ORAL 2 TIMES DAILY
Status: DISCONTINUED | OUTPATIENT
Start: 2018-12-16 | End: 2018-12-19

## 2018-12-16 RX ORDER — HYDRALAZINE HYDROCHLORIDE 20 MG/ML
10 INJECTION INTRAMUSCULAR; INTRAVENOUS EVERY 6 HOURS PRN
Status: DISCONTINUED | OUTPATIENT
Start: 2018-12-16 | End: 2018-12-16

## 2018-12-16 RX ORDER — ASPIRIN 81 MG/1
81 TABLET ORAL DAILY
Status: DISCONTINUED | OUTPATIENT
Start: 2018-12-17 | End: 2018-12-19

## 2018-12-16 RX ORDER — PANTOPRAZOLE SODIUM 40 MG/1
40 TABLET, DELAYED RELEASE ORAL DAILY
Status: DISCONTINUED | OUTPATIENT
Start: 2018-12-17 | End: 2018-12-19

## 2018-12-16 RX ORDER — LABETALOL HCL 20 MG/4 ML
40 SYRINGE (ML) INTRAVENOUS
Status: COMPLETED | OUTPATIENT
Start: 2018-12-16 | End: 2018-12-16

## 2018-12-16 RX ORDER — IBUPROFEN 200 MG
24 TABLET ORAL
Status: DISCONTINUED | OUTPATIENT
Start: 2018-12-16 | End: 2018-12-19

## 2018-12-16 RX ORDER — CLOPIDOGREL BISULFATE 75 MG/1
75 TABLET ORAL DAILY
Status: DISCONTINUED | OUTPATIENT
Start: 2018-12-17 | End: 2018-12-19

## 2018-12-16 RX ORDER — FUROSEMIDE 20 MG/1
20 TABLET ORAL EVERY 24 HOURS
Status: DISCONTINUED | OUTPATIENT
Start: 2018-12-17 | End: 2018-12-19

## 2018-12-16 RX ORDER — LABETALOL HCL 20 MG/4 ML
20 SYRINGE (ML) INTRAVENOUS
Status: COMPLETED | OUTPATIENT
Start: 2018-12-16 | End: 2018-12-16

## 2018-12-16 RX ORDER — HYDROXYCHLOROQUINE SULFATE 200 MG/1
400 TABLET, FILM COATED ORAL DAILY
Status: DISCONTINUED | OUTPATIENT
Start: 2018-12-17 | End: 2018-12-19

## 2018-12-16 RX ORDER — FUROSEMIDE 40 MG/1
40 TABLET ORAL DAILY
Status: DISCONTINUED | OUTPATIENT
Start: 2018-12-17 | End: 2018-12-19

## 2018-12-16 RX ADMIN — LABETALOL HYDROCHLORIDE 20 MG: 5 INJECTION, SOLUTION INTRAVENOUS at 03:12

## 2018-12-16 RX ADMIN — CARVEDILOL 12.5 MG: 12.5 TABLET, FILM COATED ORAL at 09:12

## 2018-12-16 RX ADMIN — LABETALOL HYDROCHLORIDE 40 MG: 5 INJECTION, SOLUTION INTRAVENOUS at 05:12

## 2018-12-16 NOTE — ED NOTES
Hourly rounding performed at this time. Daughter at bedside. Patient is in bed awake and alert. No pain, acute distress or discomfort reported or observed. Assessed for need of repositioning and assisted on bedpan to collect urine for UA. Discussed care plan, patient denies any additional needs at this time and has no complaints or questions. Room assessed for safety measures and cleanliness, no action needed at this time. The bed is in low, locked position with side rails up x 2. Personal belongings and call light in reach. Patient is oriented to call light use. Patient verbalizes will call nurse if assistance is needed.

## 2018-12-16 NOTE — ED NOTES
Hourly rounding performed at this time. Daughter at bedside. Patient is in bed awake and alert. No pain, acute distress or discomfort reported or observed. Assessed for need of repositioning and given opportunity for tolieting. Discussed care plan, patient denies any additional needs at this time and has no complaints or questions. Room assessed for safety measures and cleanliness, no action needed at this time. The bed is in low, locked position with side rails up x 2. Personal belongings and call light in reach. Patient is oriented to call light use. Patient verbalizes will call nurse if assistance is needed.

## 2018-12-16 NOTE — ED PROVIDER NOTES
"Encounter Date: 12/16/2018       History     Chief Complaint   Patient presents with    Altered Mental Status     830am yesterday  confused  w/ leg pain . Pt  has a hx of TIA's.Copnfusion worse today. Pt found sitting on floor  at 8am  by daughter. 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 History provided by patient and daughter at bedside. Patient is a difficult historian and has tangential speech requiring frequent redirection. Patient lives with her daughter who notes the patient has displayed increased confusion over the past 2 days. Daughter reports the patient woke up yesterday with the confusion at 830am. She describes the confusion as the patient having difficulty getting dressed and questioning her home medications which is not like her usual self. Patient was complaining about RLE pain yesterday that the daughter thought was causing the confusion (pain now resolved). Patient reports associated blurry vision bilaterally. Patient additionally experienced a unwitnessed fall this morning when getting out of bed at 8am. Patient contributes the fall to "someone moving my bed" which the daughter believes is unlikely as her bed has a very large brass frame. Patient reports falling into a seated position and denies head trauma or LOC. Patient missed 2 days of her Prednisone recently and denies other medication changes. Patient reports having urinary frequency and dysuria this past week, but denies having any urinary symptoms over the past 2 days. Patient denies fever, chest pain, SOB, abdominal pain, nausea, vomiting, diarrhea, melena, hematochezia.           Review of patient's allergies indicates:  No Known Allergies  Past Medical History: "   Diagnosis Date    Anemia     Arthritis     Bilateral hand pain 3/14/2018    Branch retinal vein occlusion, left eye 2/20/2015    Chronic bilateral low back pain without sciatica 3/23/2017    Cognitive communication deficit 12/19/2017    Cystoid macular edema, left eye 2/20/2015    Cystoid macular edema, left eye 2/20/2015    DJD (degenerative joint disease) of cervical spine 5/15/2013    Fatty liver 8/26/2014    Goiter 4/9/2018    Hashimoto's disease     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    Hypertension     IBS (irritable bowel syndrome) 6/21/2017    IGT (impaired glucose tolerance) 1/12/2016    Iron deficiency anemia secondary to inadequate dietary iron intake 6/24/2013    Joint pain     Keratoconjunctivitis sicca of both eyes not specified as Sjogren's 7/29/2016    Leiomyoma of uterus, unspecified 9/16/2014    Long QT interval 6/29/2016    Due to medication (plaquenil)     Macular edema 1/12/2015    Multinodular goiter 1/12/2016    Neuropathy 8/23/2017    Plaquenil causing adverse effect in therapeutic use 10/7/2016    Pneumococcal vaccine refused 8/17/2016    Pneumonia due to Streptococcus pneumoniae 4/5/2018    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/2015    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    s/p Right Total knee replacement 5/15/2013    Scoliosis of thoracic spine 5/15/2013    Small vessel disease, cerebrovascular 12/28/2017    Stroke     Vascular dementia 12/6/2017     Past Surgical History:   Procedure Laterality Date    CATARACT EXTRACTION      COLONOSCOPY N/A 9/29/2015    Procedure: COLONOSCOPY;  Surgeon: FIDELINA Sanchez MD;  [facility]  Service: Endoscopy;  Laterality: N/A;    COLONOSCOPY N/A 9/29/2015    Performed by FIDELINA Sanchez MD at [facility] (4TH FLR)    EGD (ESOPHAGOGASTRODUODENOSCOPY) N/A 3/11/2014    Performed by Federico Escobar MD at [facility] (4TH FLR)    EGD (ESOPHAGOGASTRODUODENOSCOPY) N/A 11/5/2013    Performed by Federico  MD Shawn [facility] ([facility])    ESOPHAGOGASTRODUODENOSCOPY (EGD) N/A 10/4/2017    Performed by Mg Morton [facility] at [facility]    EYE SURGERY      INJECTION-STEROID-EPIDURAL-TRANSFORAMINAL Right 9/20/2017    Performed by [facility]lein Valdez [facility] at [facility]GT    JOINT REPLACEMENT      right knee    KNEE SURGERY Left 12/31/2013    TKR    left parotidectomy      mixed tumor    SALIVARY GLAND SURGERY      TONSILLECTOMY       Family History   Problem Relation Age of Onset    Hypertension Mother     Heart disease Mother     Prostate cancer Father         prostate cancer    Cancer Father     Breast cancer Maternal Grandmother     Lupus Neg Hx     Psoriasis Neg Hx     Melanoma Neg Hx     Colon cancer Neg Hx      Social History     Tobacco Use    Smoking status: Never Smoker    Smokeless tobacco: Never Used   Substance Use Topics    Alcohol use: No     Alcohol/week: 0.0 oz     Comment: very seldom     Drug use: No     Review of Systems   Constitutional: Negative for chills, diaphoresis and fever.   HENT: Negative for congestion, sore throat and trouble swallowing.    Eyes: Positive for visual disturbance (Bilateral blurry vision). Negative for pain.   Respiratory: Negative for cough and shortness of breath.    Cardiovascular: Negative for chest pain.   Gastrointestinal: Negative for abdominal pain, diarrhea, nausea and vomiting.   Genitourinary: Positive for dysuria and frequency.   Musculoskeletal: Negative for arthralgias, back pain, neck pain and neck stiffness.   Skin: Negative for wound.   Neurological: Negative for dizziness, speech difficulty, light-headedness, numbness and headaches.   Psychiatric/Behavioral: Positive for confusion.     Physical Exam     Initial Vitals   BP Pulse Resp Temp SpO2   12/16/18 1307 12/16/18 1307 12/16/18 1307 12/16/18 1307 12/16/18 1340   (!) 200/87 96 18 98.2 °F (36.8 °C) (!) 94 %      MAP       --                Physical Exam    Constitutional: She appears  well-developed and well-nourished. She is not diaphoretic. No distress.   HENT:   Head: Normocephalic and atraumatic.   Mouth/Throat: Oropharynx is clear and moist. No oropharyngeal exudate.   Eyes: EOM are normal. Pupils are equal, round, and reactive to light.   Neck: Normal range of motion. Neck supple.   Cardiovascular: Regular rhythm. Tachycardia present.    Pulmonary/Chest: Breath sounds normal. No respiratory distress. She has no wheezes.   Speaking in full sentences   Abdominal: Soft. There is no tenderness.   Musculoskeletal: Normal range of motion. She exhibits no edema or tenderness.   No midline spinal tenderness. No pain with hip roll bilaterally. Ambulates with cane without difficulty.    Neurological: She is alert and oriented to person, place, and time. No cranial nerve deficit.   Difficulty following commands requiring frequent coaching. No dysmetria noted with finger-to-nose. Slight pronation in L hand on pronator drift.    Skin: Skin is warm and dry.   Psychiatric: She has a normal mood and affect. Her behavior is normal. Her speech is tangential. Her speech is not slurred.       ED Course   Procedures  Labs Reviewed   CBC W/ AUTO DIFFERENTIAL - Abnormal; Notable for the following components:       Result Value    MCHC 31.1 (*)     Platelets 148 (*)     Gran # (ANC) 9.6 (*)     Immature Grans (Abs) 0.06 (*)     Mono # 1.3 (*)     Gran% 79.0 (*)     Lymph% 9.0 (*)     All other components within normal limits   COMPREHENSIVE METABOLIC PANEL - Abnormal; Notable for the following components:    Glucose 122 (*)     Alkaline Phosphatase 48 (*)     eGFR if  54.4 (*)     eGFR if non  47.2 (*)     All other components within normal limits   B-TYPE NATRIURETIC PEPTIDE - Abnormal; Notable for the following components:     (*)     All other components within normal limits   URINALYSIS, REFLEX TO URINE CULTURE - Abnormal; Notable for the following components:    Color,  UA Colorless (*)     All other components within normal limits    Narrative:     Preferred Collection Type->Urine, Clean Catch   TROPONIN I   MAGNESIUM          Imaging Results          MRI Brain Without Contrast (Final result)  Result time 12/16/18 17:05:34    Final result by Flo Scott MD (12/16/18 17:05:34)                 Impression:      No evidence of diffusion restriction suggest acute infarction.    Stable appearance of old lacunar type infarcts in the cerebellum and in the supratentorial brain.    Changes of chronic small vessel ischemic disease and cerebral volume loss.    No acute intracranial process.      Electronically signed by: Flo Scott MD  Date:    12/16/2018  Time:    17:05             Narrative:    EXAMINATION:  MRI BRAIN WITHOUT CONTRAST    CLINICAL HISTORY:  Confusion/delirium, altered LOC, unexplained; Altered mental status, unspecified    TECHNIQUE:  Multiplanar and multisequence MR imaging of the brain was performed without contrast.    COMPARISON:  CT scan of the head dated 12/16/2018 and MRI of the brain dated 11/29/2018.    FINDINGS:  The craniocervical junction is within normal limits.  The midline structures are unremarkable.  The intracranial flow voids are unremarkable.    No diffusion-weighted signal abnormality is present.  There are extensive old lacunar type infarcts in the cerebellum.  There are also old lacunar type infarcts in the bilateral basal ganglia, left thalamus, and in the tatyana.  The ventricles and sulci are prominent, consistent with cerebral volume loss.  There are scattered T2/FLAIR signal hyperintensities within the periventricular and subcortical white matter.  There are no extra-axial fluid collections.  There is no evidence of intracranial hemorrhage.  There is no evidence of mass effect.    There are postoperative changes in the orbits.  The paranasal sinuses and mastoid air cells are clear.  The calvarium is intact.                               X-Ray  Chest PA And Lateral (Final result)  Result time 12/16/18 14:34:20    Final result by Flo Scott MD (12/16/18 14:34:20)                 Impression:      Stable appearance of multifocal peripheral airspace opacities.  Considerations include possible cryptogenic organizing pneumonia or recurrent multifocal pneumonia.      Electronically signed by: Flo Scott MD  Date:    12/16/2018  Time:    14:34             Narrative:    EXAMINATION:  XR CHEST PA AND LATERAL    CLINICAL HISTORY:  Disorientation, unspecified    TECHNIQUE:  PA and lateral views of the chest were performed.    COMPARISON:  Chest x-ray 10/24/2018 and CT scan of the chest dated 10/24/2018..    FINDINGS:  The trachea is unremarkable.  There is stable enlargement of the cardiomediastinal silhouette.  The hemidiaphragms are unremarkable.  There is no evidence of free air beneath the hemidiaphragms.  There are no pleural effusions.  There is no evidence of a pneumothorax.  There is no evidence of pneumomediastinum.  There is stable degree of peripheral airspace opacities involving the right upper and right lower lobes.  Stable degree of peripheral airspace opacities are identified involving the left upper lobe.  No new airspace opacity is identified.  There are degenerative changes in the osseous structures.  No definitive evidence of a fracture.                               CT Head Without Contrast (Final result)  Result time 12/16/18 14:18:26    Final result by Ben Christianson MD (12/16/18 14:18:26)                 Impression:      1. No acute intracranial abnormalities, noting grossly stable sequela of chronic microvascular ischemic change, senescent change, and remote infarcts.      Electronically signed by: Ben Christianson MD  Date:    12/16/2018  Time:    14:18             Narrative:    EXAMINATION:  CT HEAD WITHOUT CONTRAST    CLINICAL HISTORY:  Confusion/delirium, altered LOC, unexplained;    TECHNIQUE:  Low dose axial images were obtained  through the head.  Coronal and sagittal reformations were also performed. Contrast was not administered.    COMPARISON:  CT 12/01/2017, MRI 11/29/2018    FINDINGS:  There is generalized cerebral volume loss.  There is hypoattenuation in a periventricular fashion, likely sequela of chronic microvascular ischemic change.There are stable foci of hypoattenuation within the cerebellum consistent with remote infarcts.  There is a cystic appearing focus along the periphery of the occipital lobe laterally, unchanged.  There is no evidence of acute major vascular territory infarct, hemorrhage, or mass.  There is no hydrocephalus.  There are no abnormal extra-axial fluid collections.  The paranasal sinuses and mastoid air cells are clear, and there is no evidence of calvarial fracture.  The visualized soft tissues are unremarkable.                                       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 Onset of symptoms to be 830am yesterday per daughter. DDx broad and includes but not limited to metabolic encephalopathy, dementia, medication side effect, UTI, electrolyte disturbance, pneumonia, ACS, CVA. Will get labs, CXR, CTH.     Work-up shows WBC 12 (BL 12.9-18.22), H/H 13.2/42.5, CMP unremarkable, BNP elevated 302, Trop 0.025, UA negative for UTI. CXR stable in appearance of multifocal peripheral airspace opacities. CT head stable without new intracranial processes.    MRI brain obtained for further evaluation that showed no evidence of acute infarction. Stable old lacunar infarcts in cerebellum and supratentorial brain.     5:27 PM  Patient's BP elevated throughout to stay. Improved to 183/77 after two rounds of Labetalol IV. Etiology concerning for hypertensive encephalopathy. Will place in observation for ongoing  management. Patient and family agreeable to the plan. I have discussed the care of this patient with my supervising physician.                  Clinical Impression:   The primary encounter diagnosis was Altered mental status. Diagnoses of Confusion and Hypertensive urgency were also pertinent to this visit.      Disposition:   Disposition: Placed in Observation  Condition: Nelly Valdivia PA-C  12/16/18 [id number]

## 2018-12-16 NOTE — ED NOTES
Patient is uncooperative with getting hooked up to monitor, states she will allow once she completes her conversation with the PA.

## 2018-12-17 LAB
ALBUMIN SERPL BCP-MCNC: 3.1 G/DL
ALP SERPL-CCNC: 43 U/L
ALT SERPL W/O P-5'-P-CCNC: 9 U/L
ANION GAP SERPL CALC-SCNC: 10 MMOL/L
ANISOCYTOSIS BLD QL SMEAR: SLIGHT
AST SERPL-CCNC: 10 U/L
BASOPHILS # BLD AUTO: 0.02 K/UL
BASOPHILS NFR BLD: 0.1 %
BILIRUB SERPL-MCNC: 0.5 MG/DL
BUN SERPL-MCNC: 24 MG/DL
CALCIUM SERPL-MCNC: 8.8 MG/DL
CHLORIDE SERPL-SCNC: 103 MMOL/L
CO2 SERPL-SCNC: 27 MMOL/L
CREAT SERPL-MCNC: 1.2 MG/DL
DIFFERENTIAL METHOD: ABNORMAL
EOSINOPHIL # BLD AUTO: 0.1 K/UL
EOSINOPHIL NFR BLD: 0.5 %
ERYTHROCYTE [DISTWIDTH] IN BLOOD BY AUTOMATED COUNT: 14.6 %
EST. GFR  (AFRICAN AMERICAN): 49 ML/MIN/1.73 M^2
EST. GFR  (NON AFRICAN AMERICAN): 42.5 ML/MIN/1.73 M^2
GLUCOSE SERPL-MCNC: 166 MG/DL
HCT VFR BLD AUTO: 39.2 %
HGB BLD-MCNC: 12.1 G/DL
IMM GRANULOCYTES # BLD AUTO: 0.05 K/UL
IMM GRANULOCYTES NFR BLD AUTO: 0.4 %
LYMPHOCYTES # BLD AUTO: 2.2 K/UL
LYMPHOCYTES NFR BLD: 16.7 %
MAGNESIUM SERPL-MCNC: 2.2 MG/DL
MCH RBC QN AUTO: 27.8 PG
MCHC RBC AUTO-ENTMCNC: 30.9 G/DL
MCV RBC AUTO: 90 FL
MONOCYTES # BLD AUTO: 2.3 K/UL
MONOCYTES NFR BLD: 17.2 %
NEUTROPHILS # BLD AUTO: 8.7 K/UL
NEUTROPHILS NFR BLD: 65.1 %
NRBC BLD-RTO: 0 /100 WBC
OVALOCYTES BLD QL SMEAR: ABNORMAL
PHOSPHATE SERPL-MCNC: 2.5 MG/DL
PLATELET # BLD AUTO: 130 K/UL
PLATELET BLD QL SMEAR: ABNORMAL
PMV BLD AUTO: 11 FL
POIKILOCYTOSIS BLD QL SMEAR: SLIGHT
POLYCHROMASIA BLD QL SMEAR: ABNORMAL
POTASSIUM SERPL-SCNC: 3.6 MMOL/L
PROT SERPL-MCNC: 6.1 G/DL
RBC # BLD AUTO: 4.35 M/UL
SODIUM SERPL-SCNC: 140 MMOL/L
WBC # BLD AUTO: 13.34 K/UL

## 2018-12-17 PROCEDURE — 25000003 PHARM REV CODE 250: Performed by: HOSPITALIST

## 2018-12-17 PROCEDURE — 84100 ASSAY OF PHOSPHORUS: CPT

## 2018-12-17 PROCEDURE — 63600175 PHARM REV CODE 636 W HCPCS: Performed by: HOSPITALIST

## 2018-12-17 PROCEDURE — 99226 PR SUBSEQUENT OBSERVATION CARE,LEVEL III: CPT | Mod: ,,, | Performed by: PHYSICIAN ASSISTANT

## 2018-12-17 PROCEDURE — 99220 PR INITIAL OBSERVATION CARE,LEVL III: ICD-10-PCS | Mod: GC,,, | Performed by: PSYCHIATRY & NEUROLOGY

## 2018-12-17 PROCEDURE — 25000003 PHARM REV CODE 250: Performed by: PHYSICIAN ASSISTANT

## 2018-12-17 PROCEDURE — 80053 COMPREHEN METABOLIC PANEL: CPT

## 2018-12-17 PROCEDURE — 99220 PR INITIAL OBSERVATION CARE,LEVL III: CPT | Mod: GC,,, | Performed by: PSYCHIATRY & NEUROLOGY

## 2018-12-17 PROCEDURE — G0378 HOSPITAL OBSERVATION PER HR: HCPCS

## 2018-12-17 PROCEDURE — 85025 COMPLETE CBC W/AUTO DIFF WBC: CPT

## 2018-12-17 PROCEDURE — 99226 PR SUBSEQUENT OBSERVATION CARE,LEVEL III: ICD-10-PCS | Mod: ,,, | Performed by: PHYSICIAN ASSISTANT

## 2018-12-17 PROCEDURE — 36415 COLL VENOUS BLD VENIPUNCTURE: CPT

## 2018-12-17 PROCEDURE — 83735 ASSAY OF MAGNESIUM: CPT

## 2018-12-17 RX ORDER — CARVEDILOL 25 MG/1
25 TABLET ORAL 2 TIMES DAILY WITH MEALS
Status: DISCONTINUED | OUTPATIENT
Start: 2018-12-17 | End: 2018-12-19

## 2018-12-17 RX ORDER — AMLODIPINE BESYLATE 10 MG/1
10 TABLET ORAL DAILY
Status: DISCONTINUED | OUTPATIENT
Start: 2018-12-17 | End: 2018-12-19

## 2018-12-17 RX ORDER — HYDRALAZINE HYDROCHLORIDE 25 MG/1
50 TABLET, FILM COATED ORAL EVERY 8 HOURS
Status: DISCONTINUED | OUTPATIENT
Start: 2018-12-17 | End: 2018-12-17

## 2018-12-17 RX ORDER — HYDRALAZINE HYDROCHLORIDE 50 MG/1
50 TABLET, FILM COATED ORAL EVERY 8 HOURS PRN
Status: DISCONTINUED | OUTPATIENT
Start: 2018-12-17 | End: 2018-12-19

## 2018-12-17 RX ADMIN — BACLOFEN 10 MG: 10 TABLET ORAL at 09:12

## 2018-12-17 RX ADMIN — ASPIRIN 81 MG: 81 TABLET, COATED ORAL at 08:12

## 2018-12-17 RX ADMIN — ENOXAPARIN SODIUM 40 MG: 100 INJECTION SUBCUTANEOUS at 06:12

## 2018-12-17 RX ADMIN — CLOPIDOGREL 75 MG: 75 TABLET, FILM COATED ORAL at 08:12

## 2018-12-17 RX ADMIN — AMLODIPINE BESYLATE 10 MG: 10 TABLET ORAL at 06:12

## 2018-12-17 RX ADMIN — HYDROXYCHLOROQUINE SULFATE 400 MG: 200 TABLET, FILM COATED ORAL at 08:12

## 2018-12-17 RX ADMIN — SULFASALAZINE 1000 MG: 500 TABLET, DELAYED RELEASE ORAL at 08:12

## 2018-12-17 RX ADMIN — CARVEDILOL 12.5 MG: 12.5 TABLET, FILM COATED ORAL at 08:12

## 2018-12-17 RX ADMIN — BACLOFEN 10 MG: 10 TABLET ORAL at 03:12

## 2018-12-17 RX ADMIN — FUROSEMIDE 20 MG: 20 TABLET ORAL at 03:12

## 2018-12-17 RX ADMIN — SULFAMETHOXAZOLE AND TRIMETHOPRIM 1 TABLET: 800; 160 TABLET ORAL at 08:12

## 2018-12-17 RX ADMIN — BACLOFEN 10 MG: 10 TABLET ORAL at 12:12

## 2018-12-17 RX ADMIN — HYDRALAZINE HYDROCHLORIDE 50 MG: 25 TABLET, FILM COATED ORAL at 12:12

## 2018-12-17 RX ADMIN — ENOXAPARIN SODIUM 40 MG: 100 INJECTION SUBCUTANEOUS at 12:12

## 2018-12-17 RX ADMIN — BACLOFEN 10 MG: 10 TABLET ORAL at 08:12

## 2018-12-17 RX ADMIN — FUROSEMIDE 40 MG: 40 TABLET ORAL at 08:12

## 2018-12-17 RX ADMIN — SULFASALAZINE 1000 MG: 500 TABLET, DELAYED RELEASE ORAL at 09:12

## 2018-12-17 RX ADMIN — PREDNISONE 25 MG: 5 TABLET ORAL at 08:12

## 2018-12-17 RX ADMIN — CARVEDILOL 25 MG: 25 TABLET, FILM COATED ORAL at 06:12

## 2018-12-17 RX ADMIN — PANTOPRAZOLE SODIUM 40 MG: 40 TABLET, DELAYED RELEASE ORAL at 08:12

## 2018-12-17 NOTE — PLAN OF CARE
Problem: Adult Inpatient Plan of Care  Goal: Plan of Care Review  Outcome: Ongoing (interventions implemented as appropriate)  PT with no falls this shift. Moved PT to room closer to nurses station as I felt that was the better choice given her fall at home. Meds given per MAR, call light in reach, no s/s of AMS for me. She has answered each question appropriately.

## 2018-12-17 NOTE — ASSESSMENT & PLAN NOTE
Initial presentation of blurry vision, confusion much improved today. Per chart review - documented SBP seems to be fluctuating between 140-190's this morning.   Patient has underlying vascular dementia which does make patient more prone to neurologic manifestations of systemic derangements.   MRI brain without contrast - no evidence of acute intracranial abnormality.   Infectious workup negative    Clinical presentation most consistent with hypertensive encephalopathy which is now improving with treatment of the high BP.   Per chart review - Low thiamine in the past (last year). Will repeat levels.  No further inpatient recommendations at this time.   Will continue to follow patient.

## 2018-12-17 NOTE — NURSING
"Pt in bed resting with meal tray at bedside. A/O x4 and following commands, some studdering and delays at times. Pt verbalizes frustration, stating "I'm not confused, come come in here assuming I am because of what other people told you" Deescalated pt frustration. Pt with generalized weakness throughout, states some incontinence at times with lasix prescribed. LS CTA with diminished bases. Tele monitor in place scoping 60-80s NSR with possible BBB. States passing flatus and last BM to be yesterday. Reviewed medications, states to be titrating down on PO steroid after new immunosuppressants started as outpatient. Meal tray at bedside, tolerating well. Discussed PT/OT to assist with ambulation today, she uses a cane at home per pt. Some +1 swelling to RLE primarily ankle, she states it to be her LLE typically. No current distress noted at this time. Will follow up and continue to monitor.  "

## 2018-12-17 NOTE — PLAN OF CARE
"Problem: Adult Inpatient Plan of Care  Goal: Plan of Care Review  Pt transported to US per transport. BP remaining elevated post Hydralazine PRN dose, PA notified. Home meds also adjusted per PA after continuation of BP elevation, will initiate when pt arrives back to room. Daughter Rosy at bedside, updated on pt status and POC, called prior due to pt increasing agitation, consistent short term memory loss noted. YOANA Ponce is aware, and did consult to Neuro, MD rounded for eval, pt remains able to follow commands upon MD carmona. Pt has shown multiple episodes of frustration and tearfulness with situation, stating she wants to "leave". Daughter insisted she stays for further eval per phone. Discussed with YOANA Ponce, unsure on pts ability for sound judgement s/t memory and prior confusion. Pt did attempt to cloth and walk out of room, did persuade her to stay due to elevations in BP. When asked where she was going, states "I'm going home", pt has no ride, and daughter brought her in. When asked how she was leaving stated "ill call a cab", pt's cellular device was dead. Called was placed to daughter due to RN concern for pt elopement with current memory loss. Pt self removed telemetry and stated she did not want an alarm bed or chair alarm attached to her and self removed from her chair. Per assessment, pt was able to recall, person, place, time, and situation. Was temporarily convinced to stay per RN and daughter. Daughter states she will stay in room tonight with pt due to recent events. Will continue to follow up.      "

## 2018-12-17 NOTE — ED NOTES
Hourly rounding performed at this time. Daughter at bedside. Patient is in bed awake and alert, resting comfortably. No pain, acute distress or discomfort reported or observed. Assessed for need of repositioning and given opportunity for toileting. Discussed care plan, patient denies any additional needs at this time and has no complaints or questions. Room assessed for safety measures and cleanliness, no action needed at this time. The bed is in low, locked position with side rails up x 2. Personal belongings and call light in reach. Patient is oriented to call light use. Patient verbalizes will call nurse if assistance is needed.

## 2018-12-17 NOTE — ED NOTES
Telemetry Verification   Patient placed on Telemetry Box  Verified on War Room monitor  Box 1130   Monitor Tech Pam   Rate 80   Rhythm NS

## 2018-12-17 NOTE — ED NOTES
Patient transported on stretcher to room 3076A  via  with all belongings. Patient AAOx2, VSS,  no acute distress reported or observed at time of departure from ED.

## 2018-12-17 NOTE — H&P
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 female with rheumatoid arthritis on steroids, Plaquenil and Sulfasalazine, history of stroke on Aspirin/Plavix, and vascular dementia who presents to the emergency department with her daughter because of confusion.  History is obtained from the daughter, as the patient is not able to provide a good history.  The daughter mentions that at baseline, the patient is very independent and is very sharp.  However, the day prior to admission the patient was endorsing some blurred vision, having difficulty eating saying that she could not see her plate, and was having unusual behavior-like sitting on her bedroom floor, claiming that someone moved her bed causing her to fall.  She has been having tangential speech requiring frequent redirection.  Additionally, she was complaining of some right-sided leg pain, which is unusual, as she normally complaints of left-sided leg pain.  The patient denies any numbness or tingling in any of her extremities.  The patient was just started sulfasalazine yesterday, to be additional therapy for her rheumatoid arthritis.  Of note, the patient ran out of her prednisone 2 days prior to admission.  She has been taking 25 mg daily, and took her dose this morning.    The in the emergency department, she was found to have a blood pressure of 220 on arrival.  Daughter reports that her baseline blood pressure runs low, 110-130.  She received 2 doses of Labetalol 20mg IV x 2 with improvement in her blood pressure to the 180s.  Head CT and MRI were negative for any acute process.  Labs were notable for a trop 0.025.  She was admitted to  [facility] for further management.      Review of Systems:  Constitutional: Negative for chills, fatigue, fever.   HENT: Negative for sore throat, trouble swallowing.    Eyes: Negative for photophobia, visual disturbance.   Respiratory: Negative for cough, shortness of breath.    Cardiovascular: Negative for chest pain, palpitations, leg swelling.   Gastrointestinal: Negative for abdominal pain, constipation, diarrhea, nausea, vomiting.   Endocrine: Negative for cold intolerance, heat intolerance.   Genitourinary: Negative for dysuria, frequency.   Musculoskeletal: + leg pain  Skin: Negative for rash, wound, erythema   Neurological: Negative for dizziness, syncope, weakness, light-headedness.   Psychiatric/Behavioral: + confusion  All other systems reviewed and are negative.      Past Medical History: Patient has a past medical history of Anemia, Arthritis, Bilateral hand pain (3/14/2018), Branch retinal vein occlusion, left eye (2/20/2015), Chronic bilateral low back pain without sciatica (3/23/2017), Cognitive communication deficit (12/19/2017), Cystoid macular edema, left eye (2/20/2015), Cystoid macular edema, left eye (2/20/2015), DJD (degenerative joint disease) of cervical spine (5/15/2013), Fatty liver (8/26/2014), Goiter (4/9/2018), Hashimoto's disease, Hemichorea (8/23/2017), Hypertension, IBS (irritable bowel syndrome) (6/21/2017), IGT (impaired glucose tolerance) (1/12/2016), Iron deficiency anemia secondary to inadequate dietary iron intake (6/24/2013), Joint pain, Keratoconjunctivitis sicca of both eyes not specified as Sjogren's (7/29/2016), Leiomyoma of uterus, unspecified (9/16/2014), Long QT interval (6/29/2016), Macular edema (1/12/2015), Multinodular goiter (1/12/2016), Neuropathy (8/23/2017), Plaquenil causing adverse effect in therapeutic use (10/7/2016), Pneumococcal vaccine refused (8/17/2016), Pneumonia due to Streptococcus pneumoniae (4/5/2018), Primary osteoarthritis involving  multiple joints (10/21/2015), 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    Past Surgical History: Patient has a past surgical history that includes Salivary gland surgery; Tonsillectomy; left parotidectomy; Cataract extraction; Joint replacement; Knee surgery (Left, 12/31/2013); Eye surgery; ESOPHAGOGASTRODUODENOSCOPY (EGD) (N/A, 10/4/2017); INJECTION-STEROID-EPIDURAL-TRANSFORAMINAL (Right, 9/20/2017); COLONOSCOPY (N/A, 9/29/2015); EGD (ESOPHAGOGASTRODUODENOSCOPY) (N/A, 3/11/2014); and EGD (ESOPHAGOGASTRODUODENOSCOPY) (N/A, 11/5/2013).    Social History: Patient reports that  has never smoked. she has never used smokeless tobacco. She reports that she does not drink alcohol or use drugs.    Family History: Patient's family history includes Breast cancer in her maternal grandmother; Cancer in her father; Heart disease in her mother; Hypertension in her mother; Prostate cancer in her father.    Medications: Scheduled Meds:   [START ON 12/17/2018] aspirin  81 mg Oral Daily    [START ON 12/17/2018] carvedilol  12.5 mg Oral BID WM    [START ON 12/17/2018] clopidogrel  75 mg Oral Daily    enoxaparin  40 mg Subcutaneous Daily    [START ON 12/17/2018] furosemide  20 mg Oral Daily    [START ON 12/17/2018] furosemide  40 mg Oral Daily    [START ON 12/17/2018] hydroxychloroquine  400 mg Oral Daily    [START ON 12/17/2018] predniSONE  25 mg Oral Daily    [START ON 12/17/2018] sulfamethoxazole-trimethoprim 800-160mg  1 tablet Oral Daily    sulfaSALAzine  1,000 mg Oral BID     Continuous Infusions:  PRN Meds:.acetaminophen, dextrose 50%, dextrose 50%, glucagon (human recombinant), glucose, glucose, ondansetron, ramelteon, senna-docusate 8.6-50 mg, sodium chloride 0.9%    Allergies: Patient has No Known Allergies.      Physical  Exam:    Temp:  [98.2 °F (36.8 °C)]   Pulse:  [75-96]   Resp:  [16-21]   BP: (141-221)/()   SpO2:  [94 %-96 %]     Constitutional: Appears well-developed and well-nourished.   Head: Normocephalic and atraumatic.   Mouth/Throat: Oropharynx is clear and moist.   Eyes: EOM are normal. Pupils are equal, round, and reactive to light. No scleral icterus.   Neck: Normal range of motion. Neck supple.   Cardiovascular: Normal heart rate.  Regular heart rhythm.  No murmur heard.  Pulmonary/Chest: Effort normal and breath sounds normal. No respiratory distress. No wheezes, rales, or rhonchi  Abdominal: Soft. Bowel sounds are normal.  No distension.  No tenderness  Musculoskeletal: Normal range of motion. No edema.   Neurological: Alert and oriented to person, place and time (year, not day of the week).   Skin: Skin is warm and dry.   Psychiatric: Normal mood and affect. Tangential speech.      No intake or output data in the 24 hours ending 12/16/18 1957  Recent Labs   Lab 12/16/18  1350   WBC 12.15   HGB 13.2   HCT 42.5   *     Recent Labs   Lab 12/16/18  1350      K 3.9      CO2 25   BUN 18   CREATININE 1.1   *   CALCIUM 9.6   MG 2.1     Recent Labs   Lab 12/16/18  1350   ALKPHOS 48*   ALT 12   AST 15   ALBUMIN 3.6   PROT 6.8   BILITOT 0.8      No results for input(s): POCTGLUCOSE in the last 168 hours.  Recent Labs     12/16/18  1350   TROPONINI 0.025       No results for input(s): LACTATE in the last 72 hours.       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    Hypertensive Encephalopathy  Essential HTN  · Reports confusion and blurred vision x 2 days, with  on arrival - reports baseline BP to be 110-130s  · S/p Labetalol 20mg IV x 2 doses with improvement in BP to 180s  · Head CT and MRI negative for acute process  · Source unclear at this time - possible 2/2 Prednisone withdrawal as  she was off a moderate dose for 2 days?  · Continue Coreg 12.5mg PO BID  · Continue Lasix 40mg PO daily, 20mg PO afternoon  · Neuro checks  · Can consider Neuro consult if continues to remain altered    Vascular Dementia  · Chronic issue  · Very sharp and independent at baseline per family    Cervical Dystonia/Spasmodic Torticollis  · Follows with Dr. Giang of Neurology  · Continue Baclofen 10mg PO TID - will continue as do not want to cause Baclofen withdrawal  · Hold Gabapentin for now given AMS    History of Stroke  · Chronic and stable  · Continue Aspirin 81mg PO daily  · Continue Plavix 75mg PO daily    RA  Long Term Use of Systolic Steroids  Long Term Use of Immunosuppressant Medication  · Follows with Dr. Rouse of Rheumatology  · Continue Prednisone 25mg PO daily  · Continue Sulfasalazine 1g PO BID  · Continue Plaquenil 400mg PO daily  · Continue Bactrim for ppx    Thrombocytopenia  · Plt chronically low  · Monitor for bleeding    CKD Stage 3  · Creatinine 1.1 on admit, around baseline  · Monitor    Benign Hypertensive Heart and Kidney Disease with CKD  · As above     Diet:  Cardiac  GI PPx:  PPI with steroids  DVT PPx:  Lovenox  Goals of Care:  Full      Disposition:  Pending BP and AMS  Discharge Needs:  TBD      Karen Toledo MD  Hospital Medicine  Cell:  [phone]  Spectra:  [phone]  Pager:  [phone]

## 2018-12-17 NOTE — HPI
"83 yr old female with rheumatoid arthritis on steroids, history of stroke, and vascular dementia who was brought to the ED with chief complaints of disorientation and blurry vision for the 2-3 days prior to hospital admission.   Patient follows with Dr. Tobias for vascular dementia. At baseline, patient is "very sharp" with occasional forgetfulness (short term memory concerns). The day prior to admission the patient was endorsing some blurred vision, having difficulty eating saying that she could not see her plate, and was having unusual behavior-like sitting on her bedroom floor, claiming that someone moved her bed causing her to fall. She was reported to have been having tangential speech requiring frequent redirection.    Patient ran out of her prednisone 25mg qd, 2 days prior to admission.   Per chart review and nursing report this morning, patient has been much better cognitively and per daughter, patient was closer to her cognitive baseline this morning. Her BP overnight has been fluctuating. On arrival to the hospital her SBP was 220. Head CT and MRI were negative for any acute process.   Neurology is consulted for altered mental status  No reported history of seizures, CNS infections, head trauma  "

## 2018-12-17 NOTE — SUBJECTIVE & OBJECTIVE
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    Hypertension     IBS (irritable bowel syndrome) 6/21/2017    IGT (impaired glucose tolerance) 1/12/2016    Iron deficiency anemia secondary to inadequate dietary iron intake 6/24/2013    Joint pain     Keratoconjunctivitis sicca of both eyes not specified as Sjogren's 7/29/2016    Leiomyoma of uterus, unspecified 9/16/2014    Long QT interval 6/29/2016    Due to medication (plaquenil)     Macular edema 1/12/2015    Multinodular goiter 1/12/2016    Neuropathy 8/23/2017    Plaquenil causing adverse effect in therapeutic use 10/7/2016    Pneumococcal vaccine refused 8/17/2016    Pneumonia due to Streptococcus pneumoniae 4/5/2018    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/2015    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    s/p Right Total knee replacement 5/15/2013    Scoliosis of thoracic spine 5/15/2013    Small vessel disease, cerebrovascular 12/28/2017    Stroke     Vascular dementia 12/6/2017       Past Surgical History:   Procedure Laterality Date    CATARACT EXTRACTION      COLONOSCOPY N/A 9/29/2015    Procedure: COLONOSCOPY;  Surgeon: FIDELINA Sanchez MD;  Location: [facility] ([address] [place] [address]);  Service: Endoscopy;  Laterality: N/A;    COLONOSCOPY N/A 9/29/2015    Performed by FIDELINA Sanchez MD at [facility] (4TH FLR)    EGD (ESOPHAGOGASTRODUODENOSCOPY) N/A 3/11/2014    Performed by Federico Escobar MD at [facility] (4TH FLR)    EGD (ESOPHAGOGASTRODUODENOSCOPY) N/A  11/5/2013    Performed by Federico Escobar MD at [facility] (4TH FLR)    ESOPHAGOGASTRODUODENOSCOPY (EGD) N/A 10/4/2017    Performed by Mg Morton MD at [facility] ENDO    EYE SURGERY      INJECTION-STEROID-EPIDURAL-TRANSFORAMINAL Right 9/20/2017    Performed by Joselin Valdez [facility] at [facility] PAIN MGT    JOINT REPLACEMENT      right knee    KNEE SURGERY Left 12/31/2013    TKR    left parotidectomy      mixed tumor    SALIVARY GLAND SURGERY      TONSILLECTOMY         Review of patient's allergies indicates:  No Known Allergies    Current Neurological Medications:     No current facility-administered medications on file prior to encounter.      Current Outpatient Medications on File Prior to Encounter   Medication Sig    acetaminophen (TYLENOL) 500 MG tablet Take 1,000 mg by mouth daily as needed for Pain.    aspirin (ECOTRIN) 81 MG EC tablet Take 81 mg by mouth once daily.    baclofen (LIORESAL) 10 MG tablet Take 1 tablet (10 mg total) by mouth 3 (three) times daily.    carvedilol (COREG) 12.5 MG tablet Take 1 tablet (12.5 mg total) by mouth 2 (two) times daily with meals.    ciclopirox (PENLAC) 8 % Soln Apply to affected nails qhs. Clean nails with alcohol q7 days.    clopidogrel (PLAVIX) 75 mg tablet Take 75 mg by mouth once daily.    diazePAM (VALIUM) 2 MG tablet Take 1 tablet (2 mg total) by mouth as needed for Anxiety.    furosemide (LASIX) 40 MG tablet Take 20 mg by mouth once daily.    gabapentin (NEURONTIN) 300 MG capsule Take 1 capsule (300 mg total) by mouth 3 (three) times daily.    hydroxychloroquine (PLAQUENIL) 200 mg tablet Take 2 tablets (400 mg total) by mouth once daily.    magnesium oxide (MAG-OX) 400 mg tablet Take 400 mg by mouth once daily.    montelukast (SINGULAIR) 10 mg tablet Take 1 tablet (10 mg total) by mouth every evening.    omeprazole (PRILOSEC) 20 MG capsule TAKE 1 CAPSULE DAILY    potassium chloride SA (K-DUR,KLOR-CON) 10 MEQ tablet Take 2 tablets (20 mEq total) by mouth  once daily.    predniSONE (DELTASONE) 5 MG tablet Take 1 tablet (5 mg total) by mouth once daily.    sulfamethoxazole-trimethoprim 800-160mg (BACTRIM DS) 800-160 mg Tab Take 1 tablet by mouth once daily.    sulfaSALAzine (AZULFIDINE) 500 MG TbEC Take 2 tablets (1,000 mg total) by mouth 2 (two) times daily.     Family History     Problem Relation (Age of Onset)    Breast cancer Maternal Grandmother    Cancer Father    Heart disease Mother    Hypertension Mother    Prostate cancer Father        Tobacco Use    Smoking status: Never Smoker    Smokeless tobacco: Never Used   Substance and Sexual Activity    Alcohol use: No     Alcohol/week: 0.0 oz     Comment: very seldom     Drug use: No    Sexual activity: No     Comment: ,  age 52,      Review of Systems   Respiratory: Negative for shortness of breath.    Cardiovascular: Negative for chest pain.   Gastrointestinal: Negative for abdominal pain.   Genitourinary: Negative for dysuria.   Musculoskeletal: Negative for back pain.   Neurological: Negative for weakness.   Psychiatric/Behavioral: Negative for agitation.     Objective:     Vital Signs (Most Recent):  Temp: 98.5 °F (36.9 °C) (12/17/18 1133)  Pulse: 78 (12/17/18 1133)  Resp: 20 (12/17/18 1133)  BP: (!) 190/81 (12/17/18 1133)  SpO2: (!) 93 % (12/17/18 1133) Vital Signs (24h Range):  Temp:  [97.9 °F (36.6 °C)-98.5 °F (36.9 °C)] 98.5 °F (36.9 °C)  Pulse:  [69-91] 78  Resp:  [16-21] 20  SpO2:  [93 %-97 %] 93 %  BP: (141-221)/() 190/81     Weight: 70.3 kg (155 lb)  Body mass index is 26.61 kg/m².    Physical Exam   Constitutional: She appears well-developed and well-nourished.   HENT:   Head: Normocephalic and atraumatic.   Eyes: EOM are normal. Pupils are equal, round, and reactive to light.   Neurological: She has normal strength. She has a normal Finger-Nose-Finger Test.   Reflex Scores:       Bicep reflexes are 1+ on the right side and 1+ on the left side.       Patellar reflexes are 1+ on  the right side and 1+ on the left side.      NEUROLOGICAL EXAMINATION:     MENTAL STATUS   Level of consciousness: alert       Oriented to person, place, year, situation, president  Naming intact  Able to spell WORLD forwards and backwards     CRANIAL NERVES     CN II   Visual fields full to confrontation.     CN III, IV, VI   Pupils are equal, round, and reactive to light.  Extraocular motions are normal.     CN V   Facial sensation intact.     CN VII   Facial expression full, symmetric.     MOTOR EXAM   Muscle bulk: normal  Overall muscle tone: normal    Strength   Strength 5/5 throughout.     REFLEXES     Reflexes   Right biceps: 1+  Left biceps: 1+  Right patellar: 1+  Left patellar: 1+  Right plantar: normal  Left plantar: normal    SENSORY EXAM   Light touch normal.     GAIT AND COORDINATION      Coordination   Finger to nose coordination: normal      Significant Labs:   Recent Lab Results       12/17/18  0446   12/16/18 2029 12/16/18  1504        Immature Granulocytes 0.4         Immature Grans (Abs) 0.05  Comment:  Mild elevation in immature granulocytes is non specific and   can be seen in a variety of conditions including stress response,   acute inflammation, trauma and pregnancy. Correlation with other   laboratory and clinical findings is essential.           Albumin 3.1         Alkaline Phosphatase 43         ALT 9         Anion Gap 10         Aniso Slight         Appearance, UA     Clear     AST 10         Baso # 0.02         Basophil% 0.1         Bilirubin (UA)     Negative     Total Bilirubin 0.5  Comment:  For infants and newborns, interpretation of results should be based  on gestational age, weight and in agreement with clinical  observations.  Premature Infant recommended reference ranges:  Up to 24 hours.............<8.0 mg/dL  Up to 48 hours............<12.0 mg/dL  3-5 days..................<15.0 mg/dL  6-29 days.................<15.0 mg/dL           BUN, Bld 24         Calcium 8.8          Chloride 103         CO2 27         Color, UA     Colorless     Creatinine 1.2         Differential Method Automated         eGFR if  49.0         eGFR if non  42.5  Comment:  Calculation used to obtain the estimated glomerular filtration  rate (eGFR) is the CKD-EPI equation.            Eos # 0.1         Eosinophil% 0.5         Glucose 166         Glucose, UA     Negative     Gran # (ANC) 8.7         Gran% 65.1         Hematocrit 39.2         Hemoglobin 12.1         Ketones, UA     Negative     Leukocytes, UA     Negative     Lymph # 2.2         Lymph% 16.7         Magnesium 2.2         MCH 27.8         MCHC 30.9         MCV 90         Mono # 2.3         Mono% 17.2         MPV 11.0         Nitrite, UA     Negative     nRBC 0         Occult Blood UA     Negative     Ovalocytes Occasional         pH, UA     8.0     Phosphorus 2.5         Platelet Estimate Decreased         Platelets 130         Poik Slight         Poly Occasional         Potassium 3.6         Total Protein 6.1         Protein, UA     Negative  Comment:  Recommend a 24 hour urine protein or a urine   protein/creatinine ratio if globulin induced proteinuria is  clinically suspected.       RBC 4.35         RDW 14.6         Sodium 140         Specific [place], UA     1.005     Specimen UA     Urine, Clean Catch     Troponin I   0.012  Comment:  The reference interval for Troponin I represents the 99th percentile   cutoff   for our facility and is consistent with 3rd generation assay   performance.         WBC 13.34               Significant Imaging:     MRI brain WO contrast (12/16/18)  No evidence of diffusion restriction suggest acute infarction.  Stable appearance of old lacunar type infarcts in the cerebellum and in the supratentorial brain.  Changes of chronic small vessel ischemic disease and cerebral volume loss.  No acute intracranial process.

## 2018-12-17 NOTE — PLAN OF CARE
On Discharge planning assessment patient is in bed, resting quietly,  Introduced myself and CM role in patients care plan, patient verbalized understanding.     Pt lives in a single story home currently with her daughter, she has private help to assist with ADLs as needed. Patient denies HD, H/H and coumadin. Pt has BSC, W/C, rolling walker, nebulizer and straight cane. Verified Pts Address, Emergency Contact, Pharmacy, pt states she is in the process of finding a new PCP.     Pt denies any concerns for this visit, CM will continue to follow. CM name and number placed on patients white board and Health Packet given to the patient with a brief overview of the information provided patient verbalized understanding.        12/17/18 1221   Discharge Assessment   Assessment Type Discharge Planning Assessment   Confirmed/corrected address and phone number on facesheet? Yes   Assessment information obtained from? Patient   Expected Length of Stay (days) 2   Communicated expected length of stay with patient/caregiver yes   Prior to hospitilization cognitive status: Alert/Oriented   Prior to hospitalization functional status: Assistive Equipment   Current cognitive status: Alert/Oriented   Current Functional Status: Assistive Equipment   Lives With child(yash), adult   Able to Return to Prior Arrangements yes   Is patient able to care for self after discharge? Yes   Who are your caregiver(s) and their phone number(s)? Vania SilveiraUlapxy-diqvyn-[phone], Madhavi Lomas-daughter-[phone]   Patient's perception of discharge disposition home or selfcare   Readmission Within the Last 30 Days no previous admission in last 30 days   Patient currently being followed by outpatient case management? No   Patient currently receives any other outside agency services? No   Equipment Currently Used at Home bedside commode;wheelchair;walker, rolling;cane, straight;nebulizer   Do you have any problems affording any of your prescribed  medications? No   Is the patient taking medications as prescribed? yes   Does the patient have transportation home? Yes   Transportation Anticipated family or friend will provide   Does the patient receive services at the [facility]madin Clinic? No   Discharge Plan A Home with family   Discharge Plan B Home with family   Patient/Family in Agreement with Plan yes     Extended Emergency Contact Information  Primary Emergency Contact: Vania Silveira  Address: [address] [place] [address] [id number] [place] [address]  Home Phone: [phone]  Relation: Sister  Secondary Emergency Contact: Madhavi Lomas Md   [facility]  Mobile Phone: [phone]  Relation: Daughter      [facility] - [place], [place] - [address] AT [facility] [address]  [address]  [facility] [address]  Phone: [phone] Fax: [phone]    [facility] - [facility] [address] [facility]  [address] [facility] [address]  Phone: [phone] Fax: [phone]

## 2018-12-17 NOTE — PROGRESS NOTES
Progress Note   Hospital Medicine       Team: [facility]OSP MED E Doyle Ponce PA-C  Admit Date: 12/16/2018  Length of Stay:  LOS: 0 days   LETICIA 12/19/2018  Principal Problem:  Hypertensive encephalopathy    Interval hx / overnight events: Patients blood pressure continues to be labile. Tangential speech this morning, with perseverating on her children treating her like a child.    Areas of concern/ handoff: blood pressure control    Review of Systems   Constitutional: Negative for chills and fever.   Eyes: Negative for blurred vision and double vision.   Cardiovascular: Negative for chest pain and leg swelling.   Neurological: Positive for dizziness. Negative for focal weakness.   Psychiatric/Behavioral: Negative for depression. The patient is not nervous/anxious.        Temp:  [97.9 °F (36.6 °C)-98.5 °F (36.9 °C)]   Pulse:  [69-91]   Resp:  [16-21]   BP: (141-221)/(68-98)   SpO2:  [93 %-97 %]       Physical Exam   Constitutional: She is well-developed, well-nourished, and in no distress.   Cardiovascular: Normal rate and regular rhythm.   Pulmonary/Chest: Effort normal and breath sounds normal.   Abdominal: Soft. Bowel sounds are normal.   Neurological: She is alert. GCS score is 15.   Psychiatric: She is agitated. She exhibits disordered thought content and abnormal recent memory.   Tangential speech     Recent Labs   Lab 12/16/18  1350 12/17/18  0446   WBC 12.15 13.34*   HGB 13.2 12.1   HCT 42.5 39.2   * 130*     Recent Labs   Lab 12/16/18  1350 12/17/18  0446    140   K 3.9 3.6    103   CO2 25 27   BUN 18 24*   CREATININE 1.1 1.2   * 166*   CALCIUM 9.6 8.8   MG 2.1 2.2   PHOS  --  2.5*     Recent Labs   Lab 12/16/18  1350 12/17/18  0446   ALKPHOS 48* 43*   ALT 12 9*   AST 15 10   ALBUMIN 3.6 3.1*   PROT 6.8 6.1   BILITOT 0.8 0.5     No results for input(s): POCTGLUCOSE in the last 168 hours.    Other diagnostic tests      aspirin  81 mg Oral Daily    baclofen  10 mg Oral TID     carvedilol  12.5 mg Oral BID WM    clopidogrel  75 mg Oral Daily    enoxaparin  40 mg Subcutaneous Daily    furosemide  20 mg Oral Daily    furosemide  40 mg Oral Daily    hydroxychloroquine  400 mg Oral Daily    pantoprazole  40 mg Oral Daily    predniSONE  25 mg Oral Daily    sulfamethoxazole-trimethoprim 800-160mg  1 tablet Oral Daily    sulfaSALAzine  1,000 mg Oral BID       Assessment and Plan     Active Hospital Problems    Diagnosis  POA    *Hypertensive encephalopathy [I67.4]  Yes    Thrombocytopenia [D69.6]  Yes    CKD (chronic kidney disease) stage 3, GFR 30-59 ml/min [N18.3]  Yes    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  · Recently tapering down slowly after April hospitalization.      Hypertension, essential [I10]  Yes    Seropositive rheumatoid arthritis of multiple sites [M05.79]  Yes     Dx 2012 with Dr No, then Elenakem  HCQ since 2012  Prednisone 5 mg/d since 2012  Humira one dose April 2018 followed by ICU admission for pnemonia and resp failure        Resolved Hospital Problems   No resolved problems to display.       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 female with rheumatoid arthritis on steroids, Plaquenil and Sulfasalazine, history of stroke on Aspirin/Plavix, and vascular dementia who presents to the emergency department with her daughter because of confusion.  History is obtained from the daughter, as the patient is not able to provide a good history.  The daughter mentions that at baseline, the patient is very independent and is very sharp.  However, the day prior to admission the patient was  endorsing some blurred vision, having difficulty eating saying that she could not see her plate, and was having unusual behavior-like sitting on her bedroom floor, claiming that someone moved her bed causing her to fall.  She has been having tangential speech requiring frequent redirection.  Additionally, she was complaining of some right-sided leg pain, which is unusual, as she normally complaints of left-sided leg pain.  The patient denies any numbness or tingling in any of her extremities.  The patient was just started sulfasalazine yesterday, to be additional therapy for her rheumatoid arthritis.  Of note, the patient ran out of her prednisone 2 days prior to admission.  She has been taking 25 mg daily, and took her dose this morning.     The in the emergency department, she was found to have a blood pressure of 220 on arrival.  Daughter reports that her baseline blood pressure runs low, 110-130.  She received 2 doses of Labetalol 20mg IV x 2 with improvement in her blood pressure to the 180s.  Head CT and MRI were negative for any acute process.  Labs were notable for a trop 0.025.  She was admitted to Hospital Medicine for further management.      Problems Addressed today:    Hypertensive Encephalopathy  Essential HTN  · Reports confusion and blurred vision x 2 days, with  on arrival - reports baseline BP to be 110-130s  · S/p Labetalol 20mg IV x 2 doses with improvement in BP to 180s  · Head CT and MRI negative for acute process  · Source unclear at this time - possible 2/2 Prednisone withdrawal as she was off a moderate dose for 2 days?  · Continue Coreg 12.5mg PO BID  · Continue Lasix 40mg PO daily, 20mg PO afternoon  · Neuro checks  · Neurology consulted     Vascular Dementia  · Chronic issue  · Very sharp and independent at baseline per family     Cervical Dystonia/Spasmodic Torticollis  · Follows with Dr. Giang of Neurology  · Continue Baclofen 10mg PO TID - will continue as do not want to cause  Baclofen withdrawal  · Hold Gabapentin for now given AMS     History of Stroke  · Chronic and stable  · Continue Aspirin 81mg PO daily  · Continue Plavix 75mg PO daily     RA  Long Term Use of Systolic Steroids  Long Term Use of Immunosuppressant Medication  · Follows with Dr. Rouse of Rheumatology  · Continue Prednisone 25mg PO daily  · Continue Sulfasalazine 1g PO BID  · Continue Plaquenil 400mg PO daily  · Continue Bactrim for ppx     Thrombocytopenia  · Plt chronically low  · Monitor for bleeding     CKD Stage 3  · Creatinine 1.1 on admit, around baseline  · Monitor     Benign Hypertensive Heart and Kidney Disease with CKD  · As above     Diet:  Cardiac  GI PPx:  PPI with steroids  DVT PPx:  Lovenox  Goals of Care:  Full    Disposition: Pending neuro recs and improvement in BP, home in 1-2 days    Time spent in care of the patient (Greater than 1/2 spent in direct face-to-face contact) 36 minutes    Doyle Ponce PA-C

## 2018-12-18 PROBLEM — F05 DELIRIUM SUPERIMPOSED ON DEMENTIA: Status: ACTIVE | Noted: 2018-12-18

## 2018-12-18 LAB
ALBUMIN SERPL BCP-MCNC: 3.4 G/DL
ALLENS TEST: ABNORMAL
ALP SERPL-CCNC: 43 U/L
ALT SERPL W/O P-5'-P-CCNC: 9 U/L
ANION GAP SERPL CALC-SCNC: 13 MMOL/L
ANION GAP SERPL CALC-SCNC: 14 MMOL/L
ANISOCYTOSIS BLD QL SMEAR: SLIGHT
AST SERPL-CCNC: 11 U/L
BASOPHILS # BLD AUTO: 0.02 K/UL
BASOPHILS NFR BLD: 0.2 %
BILIRUB SERPL-MCNC: 0.7 MG/DL
BUN SERPL-MCNC: 14 MG/DL
BUN SERPL-MCNC: 19 MG/DL
CALCIUM SERPL-MCNC: 9.3 MG/DL
CALCIUM SERPL-MCNC: 9.8 MG/DL
CHLORIDE SERPL-SCNC: 100 MMOL/L
CHLORIDE SERPL-SCNC: 106 MMOL/L
CO2 SERPL-SCNC: 24 MMOL/L
CO2 SERPL-SCNC: 24 MMOL/L
CREAT SERPL-MCNC: 1 MG/DL
CREAT SERPL-MCNC: 1.1 MG/DL
DELSYS: ABNORMAL
DIFFERENTIAL METHOD: ABNORMAL
EOSINOPHIL # BLD AUTO: 0.1 K/UL
EOSINOPHIL NFR BLD: 0.8 %
ERYTHROCYTE [DISTWIDTH] IN BLOOD BY AUTOMATED COUNT: 15 %
EST. GFR  (AFRICAN AMERICAN): 54.4 ML/MIN/1.73 M^2
EST. GFR  (AFRICAN AMERICAN): >60 ML/MIN/1.73 M^2
EST. GFR  (NON AFRICAN AMERICAN): 47.2 ML/MIN/1.73 M^2
EST. GFR  (NON AFRICAN AMERICAN): 53 ML/MIN/1.73 M^2
GLUCOSE SERPL-MCNC: 104 MG/DL
GLUCOSE SERPL-MCNC: 94 MG/DL
HCO3 UR-SCNC: 25.4 MMOL/L (ref 24–28)
HCT VFR BLD AUTO: 43.1 %
HGB BLD-MCNC: 13.7 G/DL
HYPOCHROMIA BLD QL SMEAR: ABNORMAL
IMM GRANULOCYTES # BLD AUTO: 0.05 K/UL
IMM GRANULOCYTES NFR BLD AUTO: 0.4 %
LACTATE SERPL-SCNC: 1.6 MMOL/L
LYMPHOCYTES # BLD AUTO: 2.9 K/UL
LYMPHOCYTES NFR BLD: 24.3 %
MAGNESIUM SERPL-MCNC: 2 MG/DL
MCH RBC QN AUTO: 27.5 PG
MCHC RBC AUTO-ENTMCNC: 31.8 G/DL
MCV RBC AUTO: 86 FL
MODE: ABNORMAL
MONOCYTES # BLD AUTO: 2.2 K/UL
MONOCYTES NFR BLD: 18.7 %
NEUTROPHILS # BLD AUTO: 6.6 K/UL
NEUTROPHILS NFR BLD: 55.6 %
NRBC BLD-RTO: 0 /100 WBC
PCO2 BLDA: 32.8 MMHG (ref 35–45)
PH SMN: 7.5 [PH] (ref 7.35–7.45)
PHOSPHATE SERPL-MCNC: 2.5 MG/DL
PLATELET # BLD AUTO: 148 K/UL
PLATELET BLD QL SMEAR: ABNORMAL
PMV BLD AUTO: 10.8 FL
PO2 BLDA: 55 MMHG (ref 80–100)
POC BE: 2 MMOL/L
POC SATURATED O2: 91 % (ref 95–100)
POC TCO2: 26 MMOL/L (ref 23–27)
POTASSIUM SERPL-SCNC: 3 MMOL/L
POTASSIUM SERPL-SCNC: 3.2 MMOL/L
PROCALCITONIN SERPL IA-MCNC: 0.17 NG/ML
PROT SERPL-MCNC: 6.6 G/DL
RBC # BLD AUTO: 4.99 M/UL
SAMPLE: ABNORMAL
SITE: ABNORMAL
SODIUM SERPL-SCNC: 138 MMOL/L
SODIUM SERPL-SCNC: 143 MMOL/L
WBC # BLD AUTO: 11.78 K/UL

## 2018-12-18 PROCEDURE — 99214 PR OFFICE/OUTPT VISIT, EST, LEVL IV, 30-39 MIN: ICD-10-PCS | Mod: ,,, | Performed by: PSYCHIATRY & NEUROLOGY

## 2018-12-18 PROCEDURE — 99214 OFFICE O/P EST MOD 30 MIN: CPT | Mod: ,,, | Performed by: PSYCHIATRY & NEUROLOGY

## 2018-12-18 PROCEDURE — G0378 HOSPITAL OBSERVATION PER HR: HCPCS

## 2018-12-18 PROCEDURE — 82803 BLOOD GASES ANY COMBINATION: CPT

## 2018-12-18 PROCEDURE — 99226 PR SUBSEQUENT OBSERVATION CARE,LEVEL III: ICD-10-PCS | Mod: ,,, | Performed by: NURSE PRACTITIONER

## 2018-12-18 PROCEDURE — 25000003 PHARM REV CODE 250: Performed by: HOSPITALIST

## 2018-12-18 PROCEDURE — 80048 BASIC METABOLIC PNL TOTAL CA: CPT

## 2018-12-18 PROCEDURE — 99222 1ST HOSP IP/OBS MODERATE 55: CPT | Mod: ,,, | Performed by: PSYCHIATRY & NEUROLOGY

## 2018-12-18 PROCEDURE — 93005 ELECTROCARDIOGRAM TRACING: CPT

## 2018-12-18 PROCEDURE — 84425 ASSAY OF VITAMIN B-1: CPT

## 2018-12-18 PROCEDURE — 80053 COMPREHEN METABOLIC PANEL: CPT

## 2018-12-18 PROCEDURE — 84145 PROCALCITONIN (PCT): CPT

## 2018-12-18 PROCEDURE — 93010 EKG 12-LEAD: ICD-10-PCS | Mod: ,,, | Performed by: INTERNAL MEDICINE

## 2018-12-18 PROCEDURE — 83735 ASSAY OF MAGNESIUM: CPT

## 2018-12-18 PROCEDURE — 63600175 PHARM REV CODE 636 W HCPCS: Performed by: HOSPITALIST

## 2018-12-18 PROCEDURE — 99900035 HC TECH TIME PER 15 MIN (STAT)

## 2018-12-18 PROCEDURE — 36600 WITHDRAWAL OF ARTERIAL BLOOD: CPT

## 2018-12-18 PROCEDURE — 99226 PR SUBSEQUENT OBSERVATION CARE,LEVEL III: CPT | Mod: ,,, | Performed by: NURSE PRACTITIONER

## 2018-12-18 PROCEDURE — 92610 EVALUATE SWALLOWING FUNCTION: CPT

## 2018-12-18 PROCEDURE — 99222 PR INITIAL HOSPITAL CARE,LEVL II: ICD-10-PCS | Mod: ,,, | Performed by: PSYCHIATRY & NEUROLOGY

## 2018-12-18 PROCEDURE — 93010 ELECTROCARDIOGRAM REPORT: CPT | Mod: ,,, | Performed by: INTERNAL MEDICINE

## 2018-12-18 PROCEDURE — 63600175 PHARM REV CODE 636 W HCPCS: Performed by: NURSE PRACTITIONER

## 2018-12-18 PROCEDURE — 36415 COLL VENOUS BLD VENIPUNCTURE: CPT

## 2018-12-18 PROCEDURE — 85025 COMPLETE CBC W/AUTO DIFF WBC: CPT

## 2018-12-18 PROCEDURE — 83605 ASSAY OF LACTIC ACID: CPT

## 2018-12-18 PROCEDURE — 84100 ASSAY OF PHOSPHORUS: CPT

## 2018-12-18 RX ORDER — ONDANSETRON 2 MG/ML
4 INJECTION INTRAMUSCULAR; INTRAVENOUS EVERY 6 HOURS PRN
Status: DISCONTINUED | OUTPATIENT
Start: 2018-12-18 | End: 2018-12-31 | Stop reason: HOSPADM

## 2018-12-18 RX ORDER — POTASSIUM CHLORIDE 7.45 MG/ML
10 INJECTION INTRAVENOUS
Status: COMPLETED | OUTPATIENT
Start: 2018-12-18 | End: 2018-12-19

## 2018-12-18 RX ORDER — CLONIDINE 0.1 MG/24H
1 PATCH, EXTENDED RELEASE TRANSDERMAL
Status: DISCONTINUED | OUTPATIENT
Start: 2018-12-18 | End: 2018-12-19

## 2018-12-18 RX ORDER — HYDRALAZINE HYDROCHLORIDE 20 MG/ML
10 INJECTION INTRAMUSCULAR; INTRAVENOUS ONCE
Status: DISCONTINUED | OUTPATIENT
Start: 2018-12-18 | End: 2018-12-18

## 2018-12-18 RX ORDER — QUETIAPINE FUMARATE 25 MG/1
25 TABLET, FILM COATED ORAL NIGHTLY PRN
Status: DISCONTINUED | OUTPATIENT
Start: 2018-12-18 | End: 2018-12-19

## 2018-12-18 RX ORDER — HYDRALAZINE HYDROCHLORIDE 20 MG/ML
10 INJECTION INTRAMUSCULAR; INTRAVENOUS ONCE
Status: COMPLETED | OUTPATIENT
Start: 2018-12-18 | End: 2018-12-18

## 2018-12-18 RX ORDER — LABETALOL HCL 20 MG/4 ML
10 SYRINGE (ML) INTRAVENOUS ONCE AS NEEDED
Status: DISPENSED | OUTPATIENT
Start: 2018-12-18 | End: 2018-12-18

## 2018-12-18 RX ADMIN — ACETAMINOPHEN 650 MG: 325 TABLET ORAL at 05:12

## 2018-12-18 RX ADMIN — HYDRALAZINE HYDROCHLORIDE 10 MG: 20 INJECTION INTRAMUSCULAR; INTRAVENOUS at 04:12

## 2018-12-18 RX ADMIN — ONDANSETRON 4 MG: 2 INJECTION INTRAMUSCULAR; INTRAVENOUS at 06:12

## 2018-12-18 RX ADMIN — ENOXAPARIN SODIUM 40 MG: 100 INJECTION SUBCUTANEOUS at 05:12

## 2018-12-18 NOTE — PLAN OF CARE
Problem: SLP Goal  Goal: SLP Goal  Pt seen for clinical swallow assessment this date. Pt significantly lethargic unable to arouse for reliable PO trials and to remain NPO at this time Speech to continue to follow.     Belinda White MS, CCC-SLP  Speech Language Pathologist  Pager: [phone]  Date 12/18/2018

## 2018-12-18 NOTE — NURSING
Pt in bed, lethargic and slightly arousable, NP Amandeep at bedside. Pt arouses to pain x4 ext and withdraws, initially non responsive to voice or touch. BP 180s initially this AM, down to 161/68 unmedicated due to concerns for pt ability to swallowing aspiration with decreased alertness. Pt pt daughter this is unusual and pt has gone several hours without sleep. NP requests to hold medications for now and reeval for need for affitonal intervention. Pt pupils are reactive, pt able to only briefly answer some simple questions, no posturing or unilateral deficts noted. Pt isn't following commands consistently and attempts to move periodically within bed. NP to follow up with MD Tobias whom pt sees typically as outpatient for further follow up and possible further consulting. O2 94-95% on RA unlabored. PIV c/d/i flushed/capped. Will follow up closely.

## 2018-12-18 NOTE — NURSING
Pt in bed, remains lethargic at this time, BP remains elevated with most recent in 190s. Order in place for 10mg IVP Hydralazine to be given per RRT RN. Awaiting from pharmacy. Discussed move to inpatient due to change in status to Amandeep NP, consulted Haley with CM. Attempted to call CM x2 with no answer. New orders for pt to be NPO until further eval by speech path due to pt condition. NP to hold off on NGT at this time. Will continue to follow up.

## 2018-12-18 NOTE — PROGRESS NOTES
[facility]  Neurology  Progress Note    Patient Name: Selma Rosado MD Jose J  MRN: [id number]  Admission Date: 12/16/2018  Hospital Length of Stay: 0 days  Code Status: Full Code   Attending Provider: Theresa Prieto MD  Primary Care Physician: Bhargav Hirsch MD   Principal Problem:Hypertensive encephalopathy      Subjective:     Interval History: Confused overnight, delirious. This morning, much more somnolent, noted to have rhythmic jerking of the L foot, intermittent. Moves all extremities spontaneously, but significantly less responsive than prior and a lot less verbal.   BP remains labile and patient w/o any BP medication this whole day 2/2 NPO status for aspiration precautions and inability to obtain appropriate IV medication.     Current Neurological Medications:   N/A    Current Facility-Administered Medications   Medication Dose Route Frequency Provider Last Rate Last Dose    acetaminophen tablet 650 mg  650 mg Oral Q8H PRN Karen Toledo MD   650 mg at 12/18/18 0551    amLODIPine tablet 10 mg  10 mg Oral Daily Doyle Ponce Jr., PA-C   Stopped at 12/18/18 0900    aspirin EC tablet 81 mg  81 mg Oral Daily Karen Toledo MD   Stopped at 12/18/18 0900    baclofen tablet 10 mg  10 mg Oral TID Karen Toledo MD   Stopped at 12/18/18 0900    carvedilol tablet 25 mg  25 mg Oral BID WM Doyle Ponce Jr., PA-C   Stopped at 12/18/18 0745    clopidogrel tablet 75 mg  75 mg Oral Daily Karen Toledo MD   Stopped at 12/18/18 0900    dextrose 50 % in water (D50W) injection 12.5 g  12.5 g Intravenous PRN MERARY Anguiano MD        dextrose 50 % in water (D50W) injection 25 g  25 g Intravenous PRN Karen Toledo MD        dextrose 50% injection 12.5 g  12.5 g Intravenous PRN Karen Toledo MD        enoxaparin injection 40 mg  40 mg Subcutaneous Daily Karen Toledo MD   40 mg at 12/17/18 1822    furosemide tablet 20 mg  20 mg Oral Daily Karen Toledo MD    20 mg at 12/17/18 1523    furosemide tablet 40 mg  40 mg Oral Daily Karen Toledo MD   Stopped at 12/18/18 0900    glucagon (human recombinant) injection 1 mg  1 mg Intramuscular PRN Karen Toledo MD        glucose chewable tablet 16 g  16 g Oral PRN Karen Toledo MD        glucose chewable tablet 24 g  24 g Oral PRN Karen Toledo MD        hydrALAZINE tablet 50 mg  50 mg Oral Q8H PRN Doyle Ponce Jr., PA-C   Stopped at 12/18/18 0746    hydroxychloroquine tablet 400 mg  400 mg Oral Daily Karen Toledo MD   Stopped at 12/18/18 0900    labetalol 20 mg/4 mL (5 mg/mL) IV syring  10 mg Intravenous Once PRN Shelli Bernsetin NP        ondansetron disintegrating tablet 8 mg  8 mg Oral Q8H PRN Karen Toledo MD        pantoprazole EC tablet 40 mg  40 mg Oral Daily Karen Toledo MD   Stopped at 12/18/18 0900    potassium bicarbonate disintegrating tablet 50 mEq  50 mEq Oral Once Shelli Bernstein NP   Stopped at 12/18/18 0945    predniSONE tablet 25 mg  25 mg Oral Daily Karen Toledo MD   Stopped at 12/18/18 0900    ramelteon tablet 8 mg  8 mg Oral Nightly PRN Karen Toledo MD        senna-docusate 8.6-50 mg per tablet 1 tablet  1 tablet Oral BID PRN Karen Toledo MD        sodium chloride 0.9% flush 5 mL  5 mL Intravenous PRN Karen Toledo MD        sulfamethoxazole-trimethoprim 800-160mg per tablet 1 tablet  1 tablet Oral Daily Karen Toldeo MD   Stopped at 12/18/18 0900    sulfaSALAzine EC tablet 1,000 mg  1,000 mg Oral BID Karen Toledo MD   Stopped at 12/18/18 0900       Review of Systems   Respiratory: Negative for shortness of breath.    Cardiovascular: Negative for chest pain.   Gastrointestinal: Negative for abdominal pain.   Genitourinary: Negative for dysuria.   Musculoskeletal: Negative for back pain.   Neurological: Negative for weakness.   Psychiatric/Behavioral: Negative for agitation.     Objective:     Vital Signs (Most Recent):  Temp: 98.8 °F  "(37.1 °C) (12/18/18 1136)  Pulse: 94 (12/18/18 1648)  Resp: 18 (12/18/18 1320)  BP: (!) 185/96 (12/18/18 1648)  SpO2: 97 % (12/18/18 1648) Vital Signs (24h Range):  Temp:  [97.5 °F (36.4 °C)-98.8 °F (37.1 °C)] 98.8 °F (37.1 °C)  Pulse:  [71-94] 94  Resp:  [16-20] 18  SpO2:  [93 %-97 %] 97 %  BP: (134-201)/() 185/96     Weight: 70.3 kg (155 lb)  Body mass index is 26.61 kg/m².    Physical Exam  Constitutional: She appears well-developed and well-nourished.   HENT:   Head: Normocephalic and atraumatic.   Eyes: EOM are normal. Pupils are equal, round, and reactive to light.   Neurological: She has normal strength. She has a normal Finger-Nose-Finger Test.   Reflex Scores:       Bicep reflexes are 1+ on the right side and 1+ on the left side.       Patellar reflexes are 1+ on the right side and 1+ on the left side.        NEUROLOGICAL EXAMINATION:      MENTAL STATUS   Level of consciousness: lethargic, at times open eyes, but not on command         Minimally verbal, only stated "Yo" when spoken too     CRANIAL NERVES      CN II   Unable to evaluate     CN III, IV, VI   Pupils are equal, round, and reactive to light.     CN V   Facial sensation intact.      CN VII   Facial expression full, symmetric.      MOTOR EXAM   Muscle bulk: normal  Overall muscle tone: normal     Strength   Strength 5/5 throughout.      REFLEXES      Reflexes   Right biceps: 1+  Left biceps: 1+  Right patellar: 1+  Left patellar: 1+  Right plantar: normal  Left plantar: normal     SENSORY EXAM   Light touch normal.      GAIT AND COORDINATION      Coordination    Unable to complete           Significant Labs:   Hemoglobin A1c: No results for input(s): HGBA1C in the last 720 hours.  Blood Culture: No results for input(s): LABBLOO in the last 48 hours.  BMP:   Recent Labs   Lab 12/17/18  0446 12/18/18  0543   * 94    143   K 3.6 3.2*    106   CO2 27 24   BUN 24* 19   CREATININE 1.2 1.1   CALCIUM 8.8 9.3   MG 2.2 2.0     CBC: "   Recent Labs   Lab 12/17/18  0446 12/18/18  0543   WBC 13.34* 11.78   HGB 12.1 13.7   HCT 39.2 43.1   * 148*     CMP:   Recent Labs   Lab 12/17/18  0446 12/18/18  0543   * 94    143   K 3.6 3.2*    106   CO2 27 24   BUN 24* 19   CREATININE 1.2 1.1   CALCIUM 8.8 9.3   MG 2.2 2.0   PROT 6.1 6.6   ALBUMIN 3.1* 3.4*   BILITOT 0.5 0.7   ALKPHOS 43* 43*   AST 10 11   ALT 9* 9*   ANIONGAP 10 13   EGFRNONAA 42.5* 47.2*     CSF Culture: No results for input(s): CSFCULTURE in the last 48 hours.  CSF Studies: No results for input(s): ALIQUT, APPEARCSF, COLORCSF, CSFWBC, CSFRBC, GLUCCSF, LDHCSF, PROTEINCSF, VDRLCSF in the last 48 hours.  Inflammatory Markers: No results for input(s): SEDRATE, CRP, PROCAL in the last 48 hours.  POCT Glucose: No results for input(s): POCTGLUCOSE in the last 24 hours.  Prealbumin: No results for input(s): PREALBUMIN in the last 48 hours.  Respiratory Culture: No results for input(s): GSRESP, RESPIRATORYC in the last 48 hours.  Urine Culture: No results for input(s): LABURIN in the last 48 hours.  Urine Studies: No results for input(s): COLORU, APPEARANCEUA, PHUR, SPECGRAV, PROTEINUA, GLUCUA, KETONESU, BILIRUBINUA, OCCULTUA, NITRITE, UROBILINOGEN, LEUKOCYTESUR, RBCUA, WBCUA, BACTERIA, SQUAMEPITHEL, HYALINECASTS in the last 48 hours.    Invalid input(s): WRIGHTSUR  All pertinent lab results from the past 24 hours have been reviewed.    Significant Imaging: I have reviewed all pertinent imaging results/findings within the past 24 hours.    Assessment and Plan:     * Hypertensive encephalopathy    Initial presentation of blurry vision, confusion much improved today. Per chart review - documented SBP seems to be fluctuating between 140-190's this morning.   Patient has underlying vascular dementia which does make patient more prone to neurologic manifestations of systemic derangements.   MRI brain without contrast - no evidence of acute intracranial abnormality.   Infectious  workup negative    Clinical presentation most consistent with hypertensive encephalopathy, still with labile BP over the past 24 hour  Would recommend 25% in MAP in 24 hours; patient's return to baseline will not correlate with the reduction in MAP and will lag, will continue to observe   - primary team working on inpatient placement to allow for BP control w/ IV medication as patient is currently NPO and has not received their antiHTNsives as of yet   Will obtained extended EEG to ensure no cortical dysfunction is present   Per chart review - Low thiamine in the past (last year). Will repeat levels, still pending  Will continue to follow patient.      Delirium superimposed on dementia    - Psychiatry consulted per primary team          VTE Risk Mitigation (From admission, onward)        Ordered     enoxaparin injection 40 mg  Daily      12/16/18 1933     IP VTE HIGH RISK PATIENT  Once      12/16/18 1933     Place DIMPLE hose  Until discontinued      12/16/18 1821          Nereida English MD  Neurology  [facility]

## 2018-12-18 NOTE — CONSULTS
"[facility]  Neurology  Consult Note    Patient Name: Selma Rosado MD Jose J  MRN: [id number]  Admission Date: 12/16/2018  Hospital Length of Stay: 0 days  Code Status: Full Code   Attending Provider: MERARY Anguiano MD   Consulting Provider: Jagdsih Mac MD  Primary Care Physician: Bhargav Hirsch MD  Principal Problem:Hypertensive encephalopathy    Inpatient consult to Neurology  Consult performed by: Jagdish Mac MD  Consult ordered by: Doyle Ponce Jr., PAWING         Subjective:     Chief Complaint:  Altered mental status     HPI:   83 yr old female with rheumatoid arthritis on steroids, history of stroke, and vascular dementia who was brought to the ED with chief complaints of disorientation and blurry vision for the 2-3 days prior to hospital admission.   Patient follows with Dr. Tobias for vascular dementia. At baseline, patient is "very sharp" with occasional forgetfulness (short term memory concerns). The day prior to admission the patient was endorsing some blurred vision, having difficulty eating saying that she could not see her plate, and was having unusual behavior-like sitting on her bedroom floor, claiming that someone moved her bed causing her to fall. She was reported to have been having tangential speech requiring frequent redirection.    Patient ran out of her prednisone 25mg qd, 2 days prior to admission.   Per chart review and nursing report this morning, patient has been much better cognitively and per daughter, patient was closer to her cognitive baseline this morning. Her BP overnight has been fluctuating. On arrival to the hospital her SBP was 220. Head CT and MRI were negative for any acute process.   Neurology is consulted for altered mental status  No reported history of seizures, CNS infections, head trauma     Past Medical History:   Diagnosis Date    Anemia     Arthritis     Bilateral hand pain 3/14/2018    Branch retinal vein occlusion, left eye " 2/20/2015    Chronic bilateral low back pain without sciatica 3/23/2017    Cognitive communication deficit 12/19/2017    Cystoid macular edema, left eye 2/20/2015    Cystoid macular edema, left eye 2/20/2015    DJD (degenerative joint disease) of cervical spine 5/15/2013    Fatty liver 8/26/2014    Goiter 4/9/2018    Hashimoto's disease     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    Hypertension     IBS (irritable bowel syndrome) 6/21/2017    IGT (impaired glucose tolerance) 1/12/2016    Iron deficiency anemia secondary to inadequate dietary iron intake 6/24/2013    Joint pain     Keratoconjunctivitis sicca of both eyes not specified as Sjogren's 7/29/2016    Leiomyoma of uterus, unspecified 9/16/2014    Long QT interval 6/29/2016    Due to medication (plaquenil)     Macular edema 1/12/2015    Multinodular goiter 1/12/2016    Neuropathy 8/23/2017    Plaquenil causing adverse effect in therapeutic use 10/7/2016    Pneumococcal vaccine refused 8/17/2016    Pneumonia due to Streptococcus pneumoniae 4/5/2018    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/2015    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    s/p Right Total knee replacement 5/15/2013    Scoliosis of thoracic spine 5/15/2013    Small vessel disease, cerebrovascular 12/28/2017    Stroke     Vascular dementia 12/6/2017       Past Surgical History:   Procedure Laterality Date    CATARACT EXTRACTION      COLONOSCOPY N/A 9/29/2015    Procedure: COLONOSCOPY;  Surgeon: FIDELINA Sanchez MD;  Location: [facility] (4TH FLR);  Service: Endoscopy;  Laterality: N/A;    COLONOSCOPY N/A 9/29/2015    Performed by FIDELINA Sanchez MD at [facility] (4TH FLR)    EGD (ESOPHAGOGASTRODUODENOSCOPY) N/A 3/11/2014    Performed by Federico Escobar MD at [facility] (4TH FLR)    EGD (ESOPHAGOGASTRODUODENOSCOPY) N/A 11/5/2013    Performed by Federico Escobar MD at [facility] (4TH FLR)    ESOPHAGOGASTRODUODENOSCOPY (EGD) N/A 10/4/2017    Performed by Mg Morton MD  at [facility] ENDO    EYE SURGERY      INJECTION-STEROID-EPIDURAL-TRANSFORAMINAL Right 9/20/2017    Performed by Joselin Valdez MD at [facility] PAIN MGT    JOINT REPLACEMENT      right knee    KNEE SURGERY Left 12/31/2013    TKR    left parotidectomy      mixed tumor    SALIVARY GLAND SURGERY      TONSILLECTOMY         Review of patient's allergies indicates:  No Known Allergies    Current Neurological Medications:     No current facility-administered medications on file prior to encounter.      Current Outpatient Medications on File Prior to Encounter   Medication Sig    acetaminophen (TYLENOL) 500 MG tablet Take 1,000 mg by mouth daily as needed for Pain.    aspirin (ECOTRIN) 81 MG EC tablet Take 81 mg by mouth once daily.    baclofen (LIORESAL) 10 MG tablet Take 1 tablet (10 mg total) by mouth 3 (three) times daily.    carvedilol (COREG) 12.5 MG tablet Take 1 tablet (12.5 mg total) by mouth 2 (two) times daily with meals.    ciclopirox (PENLAC) 8 % Soln Apply to affected nails qhs. Clean nails with alcohol q7 days.    clopidogrel (PLAVIX) 75 mg tablet Take 75 mg by mouth once daily.    diazePAM (VALIUM) 2 MG tablet Take 1 tablet (2 mg total) by mouth as needed for Anxiety.    furosemide (LASIX) 40 MG tablet Take 20 mg by mouth once daily.    gabapentin (NEURONTIN) 300 MG capsule Take 1 capsule (300 mg total) by mouth 3 (three) times daily.    hydroxychloroquine (PLAQUENIL) 200 mg tablet Take 2 tablets (400 mg total) by mouth once daily.    magnesium oxide (MAG-OX) 400 mg tablet Take 400 mg by mouth once daily.    montelukast (SINGULAIR) 10 mg tablet Take 1 tablet (10 mg total) by mouth every evening.    omeprazole (PRILOSEC) 20 MG capsule TAKE 1 CAPSULE DAILY    potassium chloride SA (K-DUR,KLOR-CON) 10 MEQ tablet Take 2 tablets (20 mEq total) by mouth once daily.    predniSONE (DELTASONE) 5 MG tablet Take 1 tablet (5 mg total) by mouth once daily.    sulfamethoxazole-trimethoprim 800-160mg  (BACTRIM DS) 800-160 mg Tab Take 1 tablet by mouth once daily.    sulfaSALAzine (AZULFIDINE) 500 MG TbEC Take 2 tablets (1,000 mg total) by mouth 2 (two) times daily.     Family History     Problem Relation (Age of Onset)    Breast cancer Maternal Grandmother    Cancer Father    Heart disease Mother    Hypertension Mother    Prostate cancer Father        Tobacco Use    Smoking status: Never Smoker    Smokeless tobacco: Never Used   Substance and Sexual Activity    Alcohol use: No     Alcohol/week: 0.0 oz     Comment: very seldom     Drug use: No    Sexual activity: No     Comment: ,  age 52,      Review of Systems   Respiratory: Negative for shortness of breath.    Cardiovascular: Negative for chest pain.   Gastrointestinal: Negative for abdominal pain.   Genitourinary: Negative for dysuria.   Musculoskeletal: Negative for back pain.   Neurological: Negative for weakness.   Psychiatric/Behavioral: Negative for agitation.     Objective:     Vital Signs (Most Recent):  Temp: 98.5 °F (36.9 °C) (12/17/18 1133)  Pulse: 78 (12/17/18 1133)  Resp: 20 (12/17/18 1133)  BP: (!) 190/81 (12/17/18 1133)  SpO2: (!) 93 % (12/17/18 1133) Vital Signs (24h Range):  Temp:  [97.9 °F (36.6 °C)-98.5 °F (36.9 °C)] 98.5 °F (36.9 °C)  Pulse:  [69-91] 78  Resp:  [16-21] 20  SpO2:  [93 %-97 %] 93 %  BP: (141-221)/() 190/81     Weight: 70.3 kg (155 lb)  Body mass index is 26.61 kg/m².    Physical Exam   Constitutional: She appears well-developed and well-nourished.   HENT:   Head: Normocephalic and atraumatic.   Eyes: EOM are normal. Pupils are equal, round, and reactive to light.   Neurological: She has normal strength. She has a normal Finger-Nose-Finger Test.   Reflex Scores:       Bicep reflexes are 1+ on the right side and 1+ on the left side.       Patellar reflexes are 1+ on the right side and 1+ on the left side.      NEUROLOGICAL EXAMINATION:     MENTAL STATUS   Level of consciousness: alert       Oriented to  person, place, year, situation, president  Naming intact  Able to spell WORLD forwards and backwards     CRANIAL NERVES     CN II   Visual fields full to confrontation.     CN III, IV, VI   Pupils are equal, round, and reactive to light.  Extraocular motions are normal.     CN V   Facial sensation intact.     CN VII   Facial expression full, symmetric.     MOTOR EXAM   Muscle bulk: normal  Overall muscle tone: normal    Strength   Strength 5/5 throughout.     REFLEXES     Reflexes   Right biceps: 1+  Left biceps: 1+  Right patellar: 1+  Left patellar: 1+  Right plantar: normal  Left plantar: normal    SENSORY EXAM   Light touch normal.     GAIT AND COORDINATION      Coordination   Finger to nose coordination: normal      Significant Labs:   Recent Lab Results       12/17/18  0446   12/16/18 2029 12/16/18  1504        Immature Granulocytes 0.4         Immature Grans (Abs) 0.05  Comment:  Mild elevation in immature granulocytes is non specific and   can be seen in a variety of conditions including stress response,   acute inflammation, trauma and pregnancy. Correlation with other   laboratory and clinical findings is essential.           Albumin 3.1         Alkaline Phosphatase 43         ALT 9         Anion Gap 10         Aniso Slight         Appearance, UA     Clear     AST 10         Baso # 0.02         Basophil% 0.1         Bilirubin (UA)     Negative     Total Bilirubin 0.5  Comment:  For infants and newborns, interpretation of results should be based  on gestational age, weight and in agreement with clinical  observations.  Premature Infant recommended reference ranges:  Up to 24 hours.............<8.0 mg/dL  Up to 48 hours............<12.0 mg/dL  3-5 days..................<15.0 mg/dL  6-29 days.................<15.0 mg/dL           BUN, Bld 24         Calcium 8.8         Chloride 103         CO2 27         Color, UA     Colorless     Creatinine 1.2         Differential Method Automated         eGFR if   49.0         eGFR if non  42.5  Comment:  Calculation used to obtain the estimated glomerular filtration  rate (eGFR) is the CKD-EPI equation.            Eos # 0.1         Eosinophil% 0.5         Glucose 166         Glucose, UA     Negative     Gran # (ANC) 8.7         Gran% 65.1         Hematocrit 39.2         Hemoglobin 12.1         Ketones, UA     Negative     Leukocytes, UA     Negative     Lymph # 2.2         Lymph% 16.7         Magnesium 2.2         MCH 27.8         MCHC 30.9         MCV 90         Mono # 2.3         Mono% 17.2         MPV 11.0         Nitrite, UA     Negative     nRBC 0         Occult Blood UA     Negative     Ovalocytes Occasional         pH, UA     8.0     Phosphorus 2.5         Platelet Estimate Decreased         Platelets 130         Poik Slight         Poly Occasional         Potassium 3.6         Total Protein 6.1         Protein, UA     Negative  Comment:  Recommend a 24 hour urine protein or a urine   protein/creatinine ratio if globulin induced proteinuria is  clinically suspected.       RBC 4.35         RDW 14.6         Sodium 140         Specific [place], UA     1.005     Specimen UA     Urine, Clean Catch     Troponin I   0.012  Comment:  The reference interval for Troponin I represents the 99th percentile   cutoff   for our facility and is consistent with 3rd generation assay   performance.         WBC 13.34               Significant Imaging:     MRI brain WO contrast (12/16/18)  No evidence of diffusion restriction suggest acute infarction.  Stable appearance of old lacunar type infarcts in the cerebellum and in the supratentorial brain.  Changes of chronic small vessel ischemic disease and cerebral volume loss.  No acute intracranial process.    Assessment and Plan:     * Hypertensive encephalopathy    Initial presentation of blurry vision, confusion much improved today. Per chart review - documented SBP seems to be fluctuating between 140-190's  this morning.   Patient has underlying vascular dementia which does make patient more prone to neurologic manifestations of systemic derangements.   MRI brain without contrast - no evidence of acute intracranial abnormality.   Infectious workup negative    Clinical presentation most consistent with hypertensive encephalopathy which is now improving with treatment of the high BP.   Per chart review - Low thiamine in the past (last year). Will repeat levels.  No further inpatient recommendations at this time.   Will continue to follow patient.          VTE Risk Mitigation (From admission, onward)        Ordered     enoxaparin injection 40 mg  Daily      12/16/18 1933     IP VTE HIGH RISK PATIENT  Once      12/16/18 1933     Place DIMPLE hose  Until discontinued      12/16/18 1821          Thank you for your consult. I will follow-up with patient. Please contact us if you have any additional questions.    Jagdish Mac MD  Neurology  [facility]

## 2018-12-18 NOTE — CONSULTS
"[facility]  Psychiatry  Consult Note    Patient Name: Selma Alonzo Lux MD  MRN: [id number]   Code Status: Full Code  Admission Date: 12/16/2018  Hospital Length of Stay: 0 days  Attending Physician: Theresa Prieto MD  Primary Care Provider: Bhargav Hirsch MD    Current Legal Status: N/A    Patient information was obtained from relative(s), past medical records and ER records.   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  Psychiatry was originally consulted to address the patient's symptoms of confusion, delirium.    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 female with rheumatoid arthritis on steroids, Plaquenil and Sulfasalazine, history of stroke on Aspirin/Plavix, and vascular dementia who presents to the emergency department with her daughter because of confusion.  History is obtained from the daughter, as the patient is not able to provide a good history.  The daughter mentions that at baseline, the patient is very independent and is very sharp.  However, the day prior to admission the patient was endorsing some blurred vision, having difficulty eating saying that she could not see her plate, and was having unusual behavior-like sitting on her bedroom floor, claiming that someone moved her bed causing her to fall.  She has been having tangential speech requiring frequent redirection.  Additionally, she was complaining of some right-sided leg pain, which is unusual, as she normally complaints of left-sided leg pain.  The patient denies any numbness or tingling in any of her extremities.  " "The patient was just started sulfasalazine yesterday, to be additional therapy for her rheumatoid arthritis.  Of note, the patient ran out of her prednisone 2 days prior to admission.  She has been taking 25 mg daily, and took her dose this morning.     The in the emergency department, she was found to have a blood pressure of 220 on arrival.  Daughter reports that her baseline blood pressure runs low, 110-130.  She received 2 doses of Labetalol 20mg IV x 2 with improvement in her blood pressure to the 180s.  Head CT and MRI were negative for any acute process.  Labs were notable for a trop 0.025.  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 She was able to wake up briefly upon hearing her name, but she did not communicate verbally. She continues to fall asleep moments after being awoken. Fortunately her daughter is present and able to give an accurate HPI and psychiatric history.    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 In April of this year, she was hospitalized following a cryptogenic pneumonia. Following her hospitalization in April, she has been through a lengthy rehabilitation process, and she had been making significant strides in improving her independent functioning. Her daughter reports that in recent months, she has been able to go out and shop with her and that she had been continuing to improve. Throughout this process, the patient's prednisone dose continued to be tapered, and she was on 25mg as of recently. The patient missed doses of prednisone on Thursday and Friday of last week (and possibly Wednesday as well), and she began complaining of severe pain during that period of time. This pain also coincided with blurry vision, including the patient's inability to see some of the food on her plate on Saturday evening. During this time, she began to experience some confusion as well, " which culminated in her falling on the floor after misjudging the location of her chair near her bed. She began saying that someone had moved the chair without her noticing. Per notes, the patient also could not find where the bathroom was in the house despite normally knowing where it is. On interview with staff, patient disclosed that she had seen things the other night that were not there. Dr. Lux's daughter reports that she had not known about that prior to this conversation. Over the last 2 days, Dr. Lux's alertness has waxed and waned, and she has not been as coherent as she usually is. Last night, she was found having put her clothes in the sink with the water running, and she has been making statements about needing to leave the hospital despite not having the capability to do so.    At the time of my interview, patient was too drowsy to participate in [employer], as she fell asleep repeatedly. Per daughter, she was awake briefly moments prior, asked for water, and took a few bites of food.     Psychiatric History:  Diagnose(s): Yes - Vascular Dementia  Previous Medication Trials: No  Previous Psychiatric Hospitalizations: No  Previous Suicide Attempts: No  History of Violence: No  Outpatient Psychiatrist: No    Social History:  Marital Status:   Children: 2 daughters  Employment Status: retired  Education: medical degree  Special Ed: no   History: no  Housing Status: with daughter currently  Access to Gun: No    Substance Abuse History:  Recreational Drugs: none  Use of Alcohol: no recent use  Rehab History: No  Tobacco Use: No  Legal consequences of chemical use: N/A  Is the patient aware of the biomedical complications associated with substance abuse and mental illness? N/A    Legal History:  Past Charges/Incarcerations: No  Pending Charges: No    Family Psychiatric History:   No    Psychosocial Stressors: health.   Functioning Relationships: good relationship with  children    Psychosocial Factors:  Maladaptive or problem behaviors: No    Collateral:   Yes - Daughter provides HPI                      Hospital Course: Patient presented on 12/16 shortly after noon, was found to have elevated BP to 220 systolic, Troponin 0.025, and altered mental status.    12/17/18 PM patient found to have put her clothes in the sink with water running, began making statements about needing to leave the hospital, and displaying confused thought process.    12/18/18 psychiatry consulted to address waxing and waning behavior, confusion, and acutely altered mental status         Patient History           Medical as of 12/18/2018     Past Medical History     Diagnosis Date Comments Source    Anemia -- -- Provider    Arthritis -- -- Provider    Bilateral hand pain 3/14/2018 -- Provider    Branch retinal vein occlusion, left eye 2/20/2015 -- Provider    Chronic bilateral low back pain without sciatica 3/23/2017 -- Provider    Cognitive communication deficit 12/19/2017 -- Provider    Cystoid macular edema, left eye 2/20/2015 -- Provider    Cystoid macular edema, left eye 2/20/2015 -- Provider    DJD (degenerative joint disease) of cervical spine 5/15/2013 -- Provider    Fatty liver 8/26/2014 -- Provider    Goiter 4/9/2018 -- Provider    Hashimoto's disease -- -- Provider    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 -- Provider    Hypertension -- -- Provider    IBS (irritable bowel syndrome) 6/21/2017 -- Provider    IGT (impaired glucose tolerance) 1/12/2016 -- Provider    Iron deficiency anemia secondary to inadequate dietary iron intake 6/24/2013 -- Provider    Joint pain -- -- Provider    Keratoconjunctivitis sicca of both eyes not specified as Sjogren's 7/29/2016 -- Provider    Leiomyoma of uterus, unspecified 9/16/2014 -- Provider    Long QT interval 6/29/2016 Due to medication (plaquenil)  Provider    Macular edema 1/12/2015 -- Provider    Multinodular goiter 1/12/2016 -- Provider    Neuropathy 8/23/2017 -- Provider     Plaquenil causing adverse effect in therapeutic use 10/7/2016 -- Provider    Pneumococcal vaccine refused 8/17/2016 -- Provider    Pneumonia due to Streptococcus pneumoniae 4/5/2018 -- Provider    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/2015 -- Provider    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 -- Provider    s/p Right Total knee replacement 5/15/2013 -- Provider    Scoliosis of thoracic spine 5/15/2013 -- Provider    Small vessel disease, cerebrovascular 12/28/2017 -- Provider    Stroke -- -- Provider    Vascular dementia 12/6/2017 -- Provider          Pertinent Negatives     Diagnosis Date Noted Comments Source    Basal cell carcinoma 11/09/2018 -- Provider    Cataract 01/12/2015 -- Provider    Diabetes mellitus 01/12/2015 -- Provider    Melanoma 11/09/2018 -- Provider    Squamous cell carcinoma 11/09/2018 -- Provider                  Surgical as of 12/18/2018     Past Surgical History     Procedure Laterality Date Comments Source    SALIVARY GLAND SURGERY -- -- -- Provider    TONSILLECTOMY -- -- -- Provider    left parotidectomy [Other] -- -- mixed tumor Provider    CATARACT EXTRACTION -- -- -- Provider    COLONOSCOPY N/A 9/29/2015 Procedure: COLONOSCOPY;  Surgeon: FIDELINA Sanchez MD;  Location: [address];  Service: Endoscopy;  Laterality: N/A; Provider    JOINT REPLACEMENT -- -- right knee Provider    KNEE SURGERY Left 12/31/2013 TKR Provider    EYE SURGERY -- -- -- Provider                  Family as of 12/18/2018     Problem Relation Name Age of Onset Comments Source    Hypertension Mother -- -- -- Provider    Heart disease Mother -- -- -- Provider    Prostate cancer Father -- -- prostate cancer Provider    Cancer Father -- -- -- Provider    Breast cancer Maternal Grandmother -- -- -- Provider    Lupus Neg Hx -- -- -- Provider    Psoriasis Neg Hx -- -- -- Provider    Melanoma Neg Hx -- -- -- Provider    Colon cancer Neg Hx -- -- -- Provider            Tobacco Use as of  12/18/2018     Smoking Status Smoking Start Date Smoking Quit Date Packs/Day Years Used    Never Smoker -- -- -- --    Types Comments Smokeless Tobacco Status Smokeless Tobacco Quit Date Source    -- -- Never Used -- Provider            Alcohol Use as of 12/18/2018     Alcohol Use Drinks/Week Alcohol/Week Comments Source    No -- 0.0 oz very seldom  Provider    Frequency Standard Drinks Binge Drinking Source      -- -- -- Provider             Drug Use as of 12/18/2018     Drug Use Types Frequency Comments Source    No -- -- -- Provider            Sexual Activity as of 12/18/2018     Sexually Active Birth Control Partners Comments Source    No -- -- ,  age 52,  Provider            Activities of Daily Living as of 12/18/2018     Activities of Daily Living Question Response Comments Source    Are you pregnant or think you may be? No -- Provider    Breast-feeding No -- Provider            Social Documentation as of 12/18/2018    None           Occupational as of 12/18/2018    None           Socioeconomic as of 12/18/2018     Marital Status Spouse Name Number of Children Years Education Education Level Preferred Language Ethnicity Race Source     -- -- -- -- English /Black Black or  Provider    Financial Resource Strain Food Insecurity: Worry Food Insecurity: Inability Transportation Needs: Medical Transportation Needs: Non-medical       -- -- -- -- --             Pertinent History     Question Response Comments    Lives with -- --    Place in Birth Order -- --    Lives in -- --    Number of Siblings -- --    Raised by -- --    Legal Involvement -- --    Childhood Trauma -- --    Criminal History of -- --    Financial Status -- --    Highest Level of Education -- --    Does patient have access to a firearm? 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    Hypertension     IBS (irritable bowel syndrome) 6/21/2017    IGT (impaired glucose tolerance) 1/12/2016    Iron deficiency anemia secondary to inadequate dietary iron intake 6/24/2013    Joint pain     Keratoconjunctivitis sicca of both eyes not specified as Sjogren's 7/29/2016    Leiomyoma of uterus, unspecified 9/16/2014    Long QT interval 6/29/2016    Due to medication (plaquenil)     Macular edema 1/12/2015    Multinodular goiter 1/12/2016    Neuropathy 8/23/2017    Plaquenil causing adverse effect in therapeutic use 10/7/2016    Pneumococcal vaccine refused 8/17/2016    Pneumonia due to Streptococcus pneumoniae 4/5/2018    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/2015    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    s/p Right Total knee replacement 5/15/2013    Scoliosis of thoracic spine 5/15/2013    Small vessel disease, cerebrovascular 12/28/2017    Stroke     Vascular dementia 12/6/2017     Past Surgical History:   Procedure Laterality Date    CATARACT EXTRACTION      COLONOSCOPY N/A 9/29/2015    Procedure: COLONOSCOPY;  Surgeon: FIDELINA Sanchez MD;  Location: [facility] (4TH FLR);  Service: Endoscopy;  Laterality: N/A;    COLONOSCOPY N/A 9/29/2015    Performed by FIDELINA Sanchez MD at [facility] (4TH FLR)    EGD (ESOPHAGOGASTRODUODENOSCOPY) N/A 3/11/2014    Performed by Federico Escobar MD at [facility] (4TH FLR)    EGD (ESOPHAGOGASTRODUODENOSCOPY) N/A 11/5/2013    Performed by Federico Escobar MD at [facility] (4TH FLR)     ESOPHAGOGASTRODUODENOSCOPY (EGD) N/A 10/4/2017    [facility] ENDO    EYE SURGERY      INJECTION-STEROID-EPIDURAL-TRANSFORAMINAL Right 9/20/2017    [facility] PAIN MGT    JOINT REPLACEMENT      right knee    KNEE SURGERY Left 12/31/2013    TKR    left parotidectomy      mixed tumor    SALIVARY GLAND SURGERY      TONSILLECTOMY       Family History     Problem Relation (Age of Onset)    Breast cancer Maternal Grandmother    Cancer Father    Heart disease Mother    Hypertension Mother    Prostate cancer Father        Tobacco Use    Smoking status: Never Smoker    Smokeless tobacco: Never Used   Substance and Sexual Activity    Alcohol use: No     Alcohol/week: 0.0 oz     Comment: very seldom     Drug use: No    Sexual activity: No     Comment: ,  age 52,      Review of patient's allergies indicates:  No Known Allergies    No current facility-administered medications on file prior to encounter.      Current Outpatient Medications on File Prior to Encounter   Medication Sig    acetaminophen (TYLENOL) 500 MG tablet Take 1,000 mg by mouth daily as needed for Pain.    aspirin (ECOTRIN) 81 MG EC tablet Take 81 mg by mouth once daily.    baclofen (LIORESAL) 10 MG tablet Take 1 tablet (10 mg total) by mouth 3 (three) times daily.    carvedilol (COREG) 12.5 MG tablet Take 1 tablet (12.5 mg total) by mouth 2 (two) times daily with meals.    ciclopirox (PENLAC) 8 % Soln Apply to affected nails qhs. Clean nails with alcohol q7 days.    clopidogrel (PLAVIX) 75 mg tablet Take 75 mg by mouth once daily.    diazePAM (VALIUM) 2 MG tablet Take 1 tablet (2 mg total) by mouth as needed for Anxiety.    furosemide (LASIX) 40 MG tablet Take 20 mg by mouth once daily.    gabapentin (NEURONTIN) 300 MG capsule Take 1 capsule (300 mg total) by mouth 3 (three) times daily.    hydroxychloroquine (PLAQUENIL) 200 mg tablet Take 2 tablets (400 mg total) by  "mouth once daily.    magnesium oxide (MAG-OX) 400 mg tablet Take 400 mg by mouth once daily.    montelukast (SINGULAIR) 10 mg tablet Take 1 tablet (10 mg total) by mouth every evening.    omeprazole (PRILOSEC) 20 MG capsule TAKE 1 CAPSULE DAILY    potassium chloride SA (K-DUR,KLOR-CON) 10 MEQ tablet Take 2 tablets (20 mEq total) by mouth once daily.    predniSONE (DELTASONE) 5 MG tablet Take 1 tablet (5 mg total) by mouth once daily.    sulfamethoxazole-trimethoprim 800-160mg (BACTRIM DS) 800-160 mg Tab Take 1 tablet by mouth once daily.    sulfaSALAzine (AZULFIDINE) 500 MG TbEC Take 2 tablets (1,000 mg total) by mouth 2 (two) times daily.     Psychotherapeutics (From admission, onward)    Start     Stop Route Frequency Ordered    12/16/18 2032  ramelteon tablet 8 mg      -- Oral Nightly PRN 12/16/18 1933        Review of Systems   Unable to perform ROS: Mental status change     Strengths and Liabilities: Strength: Patient is intelligent., Strength: Patient has positive support network.    Objective:     Vital Signs (Most Recent):  Temp: 98.8 °F (37.1 °C) (12/18/18 1136)  Pulse: 82 (12/18/18 1136)  Resp: 20 (12/18/18 1136)  BP: (!) 185/91 (12/18/18 1136)  SpO2: 95 % (12/18/18 1136) Vital Signs (24h Range):  Temp:  [97.5 °F (36.4 °C)-98.9 °F (37.2 °C)] 98.8 °F (37.1 °C)  Pulse:  [71-93] 82  Resp:  [16-20] 20  SpO2:  [94 %-97 %] 95 %  BP: (134-196)/(60-94) 185/91     Height: 5' 4" (162.6 cm)  Weight: 70.3 kg (155 lb)  Body mass index is 26.61 kg/m².    No intake or output data in the 24 hours ending 12/18/18 1355    Physical Exam   Constitutional: She appears well-developed and well-nourished.   HENT:   Head: Normocephalic and atraumatic.   Eyes: Right eye exhibits no discharge. Left eye exhibits no discharge. No scleral icterus.   Neck: No tracheal deviation present.   Cardiovascular: Normal rate and regular rhythm.   Pulmonary/Chest: Effort normal. No stridor. No respiratory distress.   Abdominal: Soft. She " "exhibits no distension.   Musculoskeletal: She exhibits no edema.   Skin: Skin is warm and dry.   Nursing note and vitals reviewed.       Mental Status Exam:  Appearance: unremarkable, age appropriate,  female in hospital attire  Behavior/Cooperation: eye contact minimal even immediately after being awoken  Speech: mumbled, few vocalizations noted  Mood: unable to ilicit  Affect: flat  Thought Process: unable to assess  Thought Content: unable to assess currently. patient endorsed to staff prior to my interaction that she had been seeing things (+VH) prior to admission  Orientation: unable to assess at this time  Memory: Unable to assess  Attention Span/Concentration: Impaired  Cognition: impaired due to active delirium/drowsiness  Insight: limited by medical condition  Judgment: limited by medical condition    Modified CAM-ICU:  1. Acute change and/or fluctuating course of mental status: Yes  2. Inattention (SAVEAHAART): Patient unable to participate 2/2 drowsiness  · "Squeeze my hand, only when you hear, the letter 'A'."  3. Altered Level of Consciousness: Yes  4. Disorganized Thinking (Errors >1/6): unable to assess  · "Will a stone float on water?"  · "Are there fish in the sea?"  · "Does one pound weigh more than two?"  · "Can you use a hammer to pound a nail?"  · Command(s):  · "Hold up 2 fingers."  · "Now do the same thing with the other hand."    Score: 1+2 AND, either 3 or 4 present =   POSITIVE      Significant Labs:   Last 24 Hours:   Recent Lab Results       12/18/18  0543        Immature Granulocytes 0.4     Immature Grans (Abs) 0.05  Comment:  Mild elevation in immature granulocytes is non specific and   can be seen in a variety of conditions including stress response,   acute inflammation, trauma and pregnancy. Correlation with other   laboratory and clinical findings is essential.       Albumin 3.4     Alkaline Phosphatase 43     ALT 9     Anion Gap 13     Aniso Slight     AST 11     " Baso # 0.02     Basophil% 0.2     Total Bilirubin 0.7  Comment:  For infants and newborns, interpretation of results should be based  on gestational age, weight and in agreement with clinical  observations.  Premature Infant recommended reference ranges:  Up to 24 hours.............<8.0 mg/dL  Up to 48 hours............<12.0 mg/dL  3-5 days..................<15.0 mg/dL  6-29 days.................<15.0 mg/dL       BUN, Bld 19     Calcium 9.3     Chloride 106     CO2 24     Creatinine 1.1     Differential Method Automated     eGFR if African American 54.4     eGFR if non  47.2  Comment:  Calculation used to obtain the estimated glomerular filtration  rate (eGFR) is the CKD-EPI equation.        Eos # 0.1     Eosinophil% 0.8     Glucose 94     Gran # (ANC) 6.6     Gran% 55.6     Hematocrit 43.1     Hemoglobin 13.7     Hypo Occasional     Lymph # 2.9     Lymph% 24.3     Magnesium 2.0     MCH 27.5     MCHC 31.8     MCV 86     Mono # 2.2     Mono% 18.7     MPV 10.8     nRBC 0     Phosphorus 2.5     Platelet Estimate Decreased     Platelets 148     Potassium 3.2     Total Protein 6.6     RBC 4.99     RDW 15.0     Sodium 143     WBC 11.78           Significant Imaging: MRI: I have reviewed all pertinent results/findings within the past 24 hours. Stable, old lacunar infarcts to cerebellum and supratentorial region. No acute process    Assessment/Plan:     Delirium superimposed on dementia    Likely 2/2 multiple etiologies: Vascular dementia, previous CVA and multiple TIA's contributing to decreased cognitive reserve, Hypertensive encephalopathy, Prednisone    - Recommend repeat EKG (performed today): official read pending, but QTc noted to be approximately 464 by staff  - Recommend Seroquel 25mg QHS PRN insomnia.   - patient has had inconsistent sleep/wake cycle last few days with worsened behavior at night  - Recommend additional Seroquel 25mg Q6H PRN agitation 2/2 delirium    [facility]AV[facility]NAGEMENT  -  Minimize use of restraints; if physical restraints necessary, please utilize medical/chemical prns for agitation.  - Keep shades open and room lit during day and room dim at night in order to promote healthy circadian rhythms.  - Encourage family at bedside  - Keep whiteboard in patient's room current with the date and name of the members of patient's team for easy patient self re-orientation.            Total Time:  60 minutes including initial interview, staffing, and documentation     Jered Roberts MD   Psychiatry  [facility]

## 2018-12-18 NOTE — SUBJECTIVE & OBJECTIVE
Subjective:     Interval History: Confused overnight, delirious. This morning, much more somnolent, noted to have rhythmic jerking of the L foot, intermittent. Moves all extremities spontaneously, but significantly less responsive than prior and a lot less verbal.   BP remains labile and patient w/o any BP medication this whole day 2/2 NPO status for aspiration precautions and inability to obtain appropriate IV medication.     Current Neurological Medications:   N/A    Current Facility-Administered Medications   Medication Dose Route Frequency Provider Last Rate Last Dose    acetaminophen tablet 650 mg  650 mg Oral Q8H PRN Karen Toledo MD   650 mg at 12/18/18 0551    amLODIPine tablet 10 mg  10 mg Oral Daily Doyle Ponce Jr., PA-C   Stopped at 12/18/18 0900    aspirin EC tablet 81 mg  81 mg Oral Daily Karen Toledo MD   Stopped at 12/18/18 0900    baclofen tablet 10 mg  10 mg Oral TID Karen Toledo MD   Stopped at 12/18/18 0900    carvedilol tablet 25 mg  25 mg Oral BID WM Doyle Ponce Jr., PA-C   Stopped at 12/18/18 0745    clopidogrel tablet 75 mg  75 mg Oral Daily Karen Toledo MD   Stopped at 12/18/18 0900    dextrose 50 % in water (D50W) injection 12.5 g  12.5 g Intravenous PRN MERARY Anguiano MD        dextrose 50 % in water (D50W) injection 25 g  25 g Intravenous PRN Karen Toledo MD        dextrose 50% injection 12.5 g  12.5 g Intravenous PRN Karen Toledo MD        enoxaparin injection 40 mg  40 mg Subcutaneous Daily Karen Toledo MD   40 mg at 12/17/18 1822    furosemide tablet 20 mg  20 mg Oral Daily Karen Toledo MD   20 mg at 12/17/18 1523    furosemide tablet 40 mg  40 mg Oral Daily Karen Toledo MD   Stopped at 12/18/18 0900    glucagon (human recombinant) injection 1 mg  1 mg Intramuscular PRN Karen Toledo MD        glucose chewable tablet 16 g  16 g Oral PRN Karen Toledo MD        glucose chewable tablet 24 g  24 g Oral PRN Karen  AGUSTIN Toledo MD        hydrALAZINE tablet 50 mg  50 mg Oral Q8H PRN Doyle Ponce Jr., JOSEPHINE   Stopped at 12/18/18 0746    hydroxychloroquine tablet 400 mg  400 mg Oral Daily Karen Toledo MD   Stopped at 12/18/18 0900    labetalol 20 mg/4 mL (5 mg/mL) IV syring  10 mg Intravenous Once PRN Shelli Bernstein NP        ondansetron disintegrating tablet 8 mg  8 mg Oral Q8H PRN Karen Toledo MD        pantoprazole EC tablet 40 mg  40 mg Oral Daily Karen Toledo MD   Stopped at 12/18/18 0900    potassium bicarbonate disintegrating tablet 50 mEq  50 mEq Oral Once Shelli Bernstein NP   Stopped at 12/18/18 0945    predniSONE tablet 25 mg  25 mg Oral Daily Karen Toledo MD   Stopped at 12/18/18 0900    ramelteon tablet 8 mg  8 mg Oral Nightly PRN Karen Toledo MD        senna-docusate 8.6-50 mg per tablet 1 tablet  1 tablet Oral BID PRN Karen Toledo MD        sodium chloride 0.9% flush 5 mL  5 mL Intravenous PRN Karen Toledo MD        sulfamethoxazole-trimethoprim 800-160mg per tablet 1 tablet  1 tablet Oral Daily Karen Toledo MD   Stopped at 12/18/18 0900    sulfaSALAzine EC tablet 1,000 mg  1,000 mg Oral BID Karen Toledo MD   Stopped at 12/18/18 0900       Review of Systems   Respiratory: Negative for shortness of breath.    Cardiovascular: Negative for chest pain.   Gastrointestinal: Negative for abdominal pain.   Genitourinary: Negative for dysuria.   Musculoskeletal: Negative for back pain.   Neurological: Negative for weakness.   Psychiatric/Behavioral: Negative for agitation.     Objective:     Vital Signs (Most Recent):  Temp: 98.8 °F (37.1 °C) (12/18/18 1136)  Pulse: 94 (12/18/18 1648)  Resp: 18 (12/18/18 1320)  BP: (!) 185/96 (12/18/18 1648)  SpO2: 97 % (12/18/18 1648) Vital Signs (24h Range):  Temp:  [97.5 °F (36.4 °C)-98.8 °F (37.1 °C)] 98.8 °F (37.1 °C)  Pulse:  [71-94] 94  Resp:  [16-20] 18  SpO2:  [93 %-97 %] 97 %  BP: (134-201)/() 185/96     Weight: 70.3 kg  "(155 lb)  Body mass index is 26.61 kg/m².    Physical Exam  Constitutional: She appears well-developed and well-nourished.   HENT:   Head: Normocephalic and atraumatic.   Eyes: EOM are normal. Pupils are equal, round, and reactive to light.   Neurological: She has normal strength. She has a normal Finger-Nose-Finger Test.   Reflex Scores:       Bicep reflexes are 1+ on the right side and 1+ on the left side.       Patellar reflexes are 1+ on the right side and 1+ on the left side.        NEUROLOGICAL EXAMINATION:      MENTAL STATUS   Level of consciousness: lethargic, at times open eyes, but not on command         Minimally verbal, only stated "Yo" when spoken too     CRANIAL NERVES      CN II   Unable to evaluate     CN III, IV, VI   Pupils are equal, round, and reactive to light.     CN V   Facial sensation intact.      CN VII   Facial expression full, symmetric.      MOTOR EXAM   Muscle bulk: normal  Overall muscle tone: normal     Strength   Strength 5/5 throughout.      REFLEXES      Reflexes   Right biceps: 1+  Left biceps: 1+  Right patellar: 1+  Left patellar: 1+  Right plantar: normal  Left plantar: normal     SENSORY EXAM   Light touch normal.      GAIT AND COORDINATION      Coordination   Unable to complete           Significant Labs:   Hemoglobin A1c: No results for input(s): HGBA1C in the last 720 hours.  Blood Culture: No results for input(s): LABBLOO in the last 48 hours.  BMP:   Recent Labs   Lab 12/17/18  0446 12/18/18  0543   * 94    143   K 3.6 3.2*    106   CO2 27 24   BUN 24* 19   CREATININE 1.2 1.1   CALCIUM 8.8 9.3   MG 2.2 2.0     CBC:   Recent Labs   Lab 12/17/18  0446 12/18/18  0543   WBC 13.34* 11.78   HGB 12.1 13.7   HCT 39.2 43.1   * 148*     CMP:   Recent Labs   Lab 12/17/18  0446 12/18/18  0543   * 94    143   K 3.6 3.2*    106   CO2 27 24   BUN 24* 19   CREATININE 1.2 1.1   CALCIUM 8.8 9.3   MG 2.2 2.0   PROT 6.1 6.6   ALBUMIN 3.1* 3.4* "   BILITOT 0.5 0.7   ALKPHOS 43* 43*   AST 10 11   ALT 9* 9*   ANIONGAP 10 13   EGFRNONAA 42.5* 47.2*     CSF Culture: No results for input(s): CSFCULTURE in the last 48 hours.  CSF Studies: No results for input(s): ALIQUT, APPEARCSF, COLORCSF, CSFWBC, CSFRBC, GLUCCSF, LDHCSF, PROTEINCSF, VDRLCSF in the last 48 hours.  Inflammatory Markers: No results for input(s): SEDRATE, CRP, PROCAL in the last 48 hours.  POCT Glucose: No results for input(s): POCTGLUCOSE in the last 24 hours.  Prealbumin: No results for input(s): PREALBUMIN in the last 48 hours.  Respiratory Culture: No results for input(s): GSRESP, RESPIRATORYC in the last 48 hours.  Urine Culture: No results for input(s): LABURIN in the last 48 hours.  Urine Studies: No results for input(s): COLORU, APPEARANCEUA, PHUR, SPECGRAV, PROTEINUA, GLUCUA, KETONESU, BILIRUBINUA, OCCULTUA, NITRITE, UROBILINOGEN, LEUKOCYTESUR, RBCUA, WBCUA, BACTERIA, SQUAMEPITHEL, HYALINECASTS in the last 48 hours.    Invalid input(s): WRIGHTSUR  All pertinent lab results from the past 24 hours have been reviewed.    Significant Imaging: I have reviewed all pertinent imaging results/findings within the past 24 hours.

## 2018-12-18 NOTE — PT/OT/SLP PROGRESS
Physical Therapy      Patient Name:  Selma Lux MD   MRN:  [id number]    Patient not seen today secondary to (Hold this date per RN 2* current medical status and decreased level of arousal). Will follow-up as pt appropriate.    Darlene Wu, PT, DPT   12/18/2018  [phone]

## 2018-12-18 NOTE — PT/OT/SLP EVAL
Speech Language Pathology Evaluation  Bedside Swallow    Patient Name:  Selma Lux MD   MRN:  [id number]  Admitting Diagnosis: Hypertensive encephalopathy    Recommendations:                 General Recommendations:  Dysphagia therapy  Diet recommendations:  NPO, NPO   Aspiration Precautions: given severity of lethargy consider IV meds and/or alternate means ot nutrition/hydration/medication via NG tube    General Precautions: Standard,    Communication strategies:  none    History:     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    Hypertension     IBS (irritable bowel syndrome) 6/21/2017    IGT (impaired glucose tolerance) 1/12/2016    Iron deficiency anemia secondary to inadequate dietary iron intake 6/24/2013    Joint pain     Keratoconjunctivitis sicca of both eyes not specified as Sjogren's 7/29/2016    Leiomyoma of uterus, unspecified 9/16/2014    Long QT interval 6/29/2016    Due to medication (plaquenil)     Macular edema 1/12/2015    Multinodular goiter 1/12/2016    Neuropathy 8/23/2017    Plaquenil causing adverse effect in therapeutic use 10/7/2016    Pneumococcal vaccine refused 8/17/2016    Pneumonia due to Streptococcus pneumoniae 4/5/2018    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/2015    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    s/p Right Total knee replacement 5/15/2013    Scoliosis of thoracic spine 5/15/2013    Small vessel disease, cerebrovascular 12/28/2017    Stroke     Vascular  dementia 12/6/2017       Past Surgical History:   Procedure Laterality Date    CATARACT EXTRACTION      COLONOSCOPY N/A 9/29/2015    Procedure: COLONOSCOPY;  Surgeon: FIDELINA Sanchez MD;  Location: [facility] (4TH FLR);  Service: Endoscopy;  Laterality: N/A;    COLONOSCOPY N/A 9/29/2015    Performed by FIDELINA Sanchez MD at [facility] ENDO (4TH FLR)    EGD (ESOPHAGOGASTRODUODENOSCOPY) N/A 3/11/2014    Performed by Federico Escobar MD at [facility] ENDO (4TH FLR)    EGD (ESOPHAGOGASTRODUODENOSCOPY) N/A 11/5/2013    Performed by Federico Escobar MD at [facility] ENDO (4TH FLR)    ESOPHAGOGASTRODUODENOSCOPY (EGD) N/A 10/4/2017    Performed by Mg Morton MD at [facility]    EYE SURGERY      INJECTION-STEROID-EPIDURAL-TRANSFORAMINAL Right 9/20/2017    Performed [facility]elin Valdez [facility] at [facility] PAIN MGT    JOINT REPLACEMENT      right knee    KNEE SURGERY Left 12/31/2013    TKR    left parotidectomy      mixed tumor    SALIVARY GLAND SURGERY      TONSILLECTOMY         Social History: Patient lives with alone per sister report. At baseline manages all her meds and household chores. Sister reports mild forgetfulness at baseline     Prior Intubation HX:  N/A    Modified Barium Swallow: N/A    Chest X-Rays: N/A    Prior diet: regular solids and thin liquids at baseline; currently NPO     Occupation/hobbies/homemaking: retired physician     Subjective     Asleep upon arrival; sister at the bedside     Pain/Comfort:  · Pain Rating 1: (Pt unable to report )  · Pain Rating Post-Intervention 1: (Pt unable to report )    Objective:     Oral Musculature Evaluation  · Oral Musculature: unable to assess due to poor participation/comprehension  · Dentition: present and adequate(via sponteneous observation )  · Secretion Management: adequate  · Volitional Cough: unable to assess/elicit  · Volitional Swallow: unable to assess/elicit  · Voice Prior to PO Intake: unable to assess/elicit     Bedside Swallow Eval:   Consistencies Assessed:  · Thin  liquids ice hcips cirlced to lips; 1/2 tsp of juice brought to lips      Oral Phase:   · significant lethargy immpacting ability to accept PO trials   · Poor oral acceptance  · Unable to assess   · Pt licked lips to taste both ice and trace volumes of juice however did not open mouth to fully accept trials and no measurable volumes accepted  · Additional PO trials deferred given poor state maintenance     Pharyngeal Phase:   · Unable to assess    Treatment:   Pt with significant lethargy this date impacting ability to truly achieve and maintain an awake and alert state to participate in PO trials. Pt unable to arouse despite max tactile, noxious tactile and auditory stimuli. Education provided to sister at the bedside not offering any PO intake while pt remains in this lethargic state and speech will continue to follow. Sister in agreement with plan. SLP also discussed recommendations with RN.       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 female with an SLP diagnosis of Dysphagia complicated by severe lethargy. Pt to otherwise remains NPO at this time and speech to continue to follow.     Goals:   Multidisciplinary Problems     SLP Goals        Problem: SLP Goal    Goal Priority Disciplines Outcome   SLP Goal     SLP    Description:  Speech Language Pathology Goals  Goals expected to be met by 12/25    1. Pt will participate in ongoing swallow assessment to help determine least restrictive diet                       Plan:     · Patient to be seen:  4 x/week   · Plan of Care expires:  01/16/19  · Plan of Care reviewed with:  patient, family(sister)   · SLP Follow-Up:  Yes       Discharge recommendations:  (TBD)   Barriers to Discharge:  no PO diet able to be established at this time     Time Tracking:     SLP Treatment Date:   12/18/18  Speech Start Time:  1524  Speech Stop Time:  1536     Speech Total Time (min):  12 min    Billable Minutes: Eval Swallow and Oral Function 12    Belinda White  CCC-SLP  12/18/2018

## 2018-12-18 NOTE — ASSESSMENT & PLAN NOTE
Likely 2/2 multiple etiologies: Vascular dementia, previous CVA and multiple TIA's contributing to decreased cognitive reserve, Hypertensive encephalopathy, Prednisone    - Recommend repeat EKG (performed today): official read pending, but QTc noted to be approximately 464 by staff  - Recommend Seroquel 25mg QHS PRN insomnia.   - patient has had inconsistent sleep/wake cycle last few days with worsened behavior at night  - Recommend additional Seroquel 25mg Q6H PRN agitation 2/2 delirium    [facility]HAVIOR [facility]NAGEMENT  - Minimize use of restraints; if physical restraints necessary, please utilize medical/chemical prns for agitation.  - Keep shades open and room lit during day and room dim at night in order to promote healthy circadian rhythms.  - Encourage family at bedside  - Keep whiteboard in patient's room current with the date and name of the members of patient's team for easy patient self re-orientation.

## 2018-12-18 NOTE — PROGRESS NOTES
Chart check completed, abnormal VS noted, bedside RNBranden contacted, no concerns verbalized at this time, instructed to call [phone] for further concerns or assistance.

## 2018-12-18 NOTE — ASSESSMENT & PLAN NOTE
Initial presentation of blurry vision, confusion much improved today. Per chart review - documented SBP seems to be fluctuating between 140-190's this morning.   Patient has underlying vascular dementia which does make patient more prone to neurologic manifestations of systemic derangements.   MRI brain without contrast - no evidence of acute intracranial abnormality.   Infectious workup negative    Clinical presentation most consistent with hypertensive encephalopathy, still with labile BP over the past 24 hour  Would recommend 25% in MAP in 24 hours; patient's return to baseline will not correlate with the reduction in MAP and will lag, will continue to observe   - primary team working on inpatient placement to allow for BP control w/ IV medication as patient is currently NPO and has not received their antiHTNsives as of yet   Will obtained extended EEG to ensure no cortical dysfunction is present   Per chart review - Low thiamine in the past (last year). Will repeat levels, still pending  Will continue to follow patient.

## 2018-12-18 NOTE — NURSING
Pt was found in room naked with clothes in sink with water running overflowing onto floor, pt stated she was leaving, explained she had no clothes on and her daughter was coming back to stay the night with her, redirected pt and placed in bed, bed alarm set, daughter back in room explained what happened, daughter stated this isnt like her normal, will cont to monitor pt.

## 2018-12-18 NOTE — SIGNIFICANT EVENT
RRT RN Follow up note:    Came to bedside to administer hydralazine 10 mg for HTN in obs unit. BP prior to administration 201/101. New IV to right AC started. Patient remains very lethargic. Head of bed laid flat and patient began vomiting, immediately turned onto side to prevent aspiration. Approx 50cc yellow vomit. Hydralazine administered. Also notable patient with temp 99.8 and flushed cheeks at this time. Team paged to express concerns.     1702 - Called critical care fellow to come to bedside for assessment. Patient remains SBP 180s after 45 minutes since hydralazine IV.  Fellow spoke with IM F NP regarding patient's clinical condition. Plan for clonidine patch in interim until patient can be moved to an inpatient unit.

## 2018-12-18 NOTE — PLAN OF CARE
Problem: Adult Inpatient Plan of Care  Goal: Plan of Care Review  Outcome: Ongoing (interventions implemented as appropriate)  Pt in room daughter at bedside, pt has increased confusion, daughter states that this new for her, reoriented to surroundings, VSS, call light in reach, pt turns bed alarm off, refusing tele, will cont to monitor pt

## 2018-12-18 NOTE — PT/OT/SLP PROGRESS
Occupational Therapy      Patient Name:  Selma Alonzo Lux MD   MRN:  [id number]    OT orders received and acknowledged. Patient not seen today secondary to RN hold due to current medical status and decreased level of arousal to successful participate in therapy at this time.  Will follow-up next scheduled OT session.    Etelvina Alvarado, OTR/L  [phone]  12/18/2018

## 2018-12-18 NOTE — HOSPITAL COURSE
Patient presented on 12/16 shortly after noon, was found to have elevated BP to 220 systolic, Troponin 0.025, and altered mental status.    12/17/18 PM patient found to have put her clothes in the sink with water running, began making statements about needing to leave the hospital, and displaying confused thought process.    12/18/18 psychiatry consulted to address waxing and waning behavior, confusion, and acutely altered mental status    12/19/18 patient has become somnolent, no longer responding to verbal stimuli as during interaction yesterday. She is unable to participate in interview whatsoever today. Patient's daughter is present this morning but not on rounds. Per daughter, patient has remained this this state overnight. No periods of agitation overnight. Patient currently unable to tolerate PO, so seroquel PRN was discontinued.

## 2018-12-18 NOTE — SIGNIFICANT EVENT
Rapid Response Nurse Note     Rapid Response Metrics:     Admit Date: 2018  LOS: 0  Code Status: Full Code   Date of Consult: 2018  : 1935  Age: 83 y.o.  Weight:   Wt Readings from Last 1 Encounters:   18 70.3 kg (155 lb)     Sex: female  Race: Black or    Bed: OBS 3081/OBS 3081A:   MRN: [id number]  Time Rapid Response Team page Received: 1255  Time Rapid Response Team at Bedside:  1310  Time Rapid Response Team left Bedside: 1337  Was the patient discharged from an ICU this admission?   no  Was the patient discharged from a PACU within last 24 hours?  no  Did the patient receive conscious sedation/general anesthesia within last 24 hours?  no  Was the patient in the ED within the past 24 hours?  yes  Was the patient started on NIPPV within the past 24 hours?  no  Did this progress into an ARC or CPA:  no  Attending Physician: Theresa Prieto MD  Primary Service: [facility]  Consult Requested By: Theresa Prieto MD   Rapid Response Indication(s): HTN    SITUATION:     Reason for Call:   Called to evaluate the patient for Circulatory    BACKGROUND:     Why is the patient in the hospital?: HTN   What changed?: persistent HTN     ASSESSMENT:     What did you find: Patient drowsy, will arouse to vigorous stimulation but otherwise very lethargic, unable to take PO meds 2/2 aspiration risk. Patient did not receive PO meds today due to lethargy. Labetalol IV was ordered but is on backorder. Hydralazine IV ordered but unable to be pushed and monitor by Obs RN. Spoke with  F provider regarding necessity for change in higher level of care/admit inpatient for BP control if patient unable to have NGT or take PO meds.     RECOMMENDATIONS:     We recommend: admit to inpatient for IV BP control     FOLLOW-UP/CONTINGENCY PLAN:     Patient needs a second visit at : this afternoon     Call the rapid response Nurse at x [phone] for additional questions or concerns.      PHYSICIAN  ESCALATION:     Orders received and case discussed with KAMLA Bernstein     Disposition: Remain in room 3081.

## 2018-12-18 NOTE — HPI
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  Psychiatry was originally consulted to address the patient's symptoms of confusion, delirium.    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 female with rheumatoid arthritis on steroids, Plaquenil and Sulfasalazine, history of stroke on Aspirin/Plavix, and vascular dementia who presents to the emergency department with her daughter because of confusion.  History is obtained from the daughter, as the patient is not able to provide a good history.  The daughter mentions that at baseline, the patient is very independent and is very sharp.  However, the day prior to admission the patient was endorsing some blurred vision, having difficulty eating saying that she could not see her plate, and was having unusual behavior-like sitting on her bedroom floor, claiming that someone moved her bed causing her to fall.  She has been having tangential speech requiring frequent redirection.  Additionally, she was complaining of some right-sided leg pain, which is unusual, as she normally complaints of left-sided leg pain.  The patient denies any numbness or tingling in any of her extremities.  The patient was just started sulfasalazine yesterday, to be additional therapy for her rheumatoid arthritis.  Of note, the patient ran out of her prednisone 2 days prior to admission.  She has been taking 25 mg daily, and took her dose this morning.     The in the emergency department, she was found to have a blood pressure of 220 on arrival.  Daughter reports that her baseline blood pressure runs low, 110-130.  She received 2 doses of Labetalol 20mg IV x 2 with improvement in her blood pressure to the 180s.  Head CT and MRI were negative for any acute process.  Labs were notable for a trop 0.025.  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 She was able to wake up briefly upon hearing her name, but she did not communicate verbally. She continues to fall asleep moments after being awoken. Fortunately her daughter is present and able to give an accurate HPI and psychiatric history.    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 In April of this year, she was hospitalized following a cryptogenic pneumonia. Following her hospitalization in April, she has been through a lengthy rehabilitation process, and she had been making significant strides in improving her independent functioning. Her daughter reports that in recent months, she has been able to go out and shop with her and that she had been continuing to improve. Throughout this process, the patient's prednisone dose continued to be tapered, and she was on 25mg as of recently. The patient missed doses of prednisone on Thursday and Friday of last week (and possibly Wednesday as well), and she began complaining of severe pain during that period of time. This pain also coincided with blurry vision, including the patient's inability to see some of the food on her plate on Saturday evening. During this time, she began to experience some confusion as well, which culminated in her falling on the floor after misjudging the location of her chair near her bed. She began saying that someone had moved the chair without her noticing. Per notes, the patient also could not find where the bathroom was in the house despite normally knowing where it is. On interview with staff, patient disclosed that she had seen things the other night that were not there. Dr. Lux's daughter reports that she had not known about that prior to this conversation. Over the last 2 days, Dr. Lux's alertness has waxed and waned, and she has not been as coherent as she usually is. Last night, she was found having put her clothes in the sink with  the water running, and she has been making statements about needing to leave the hospital despite not having the capability to do so.    At the time of my interview, patient was too drowsy to participate in [id number], as she fell asleep repeatedly. Per daughter, she was awake briefly moments prior, asked for water, and took a few bites of food.     Psychiatric History:  Diagnose(s): Yes - Vascular Dementia  Previous Medication Trials: No  Previous Psychiatric Hospitalizations: No  Previous Suicide Attempts: No  History of Violence: No  Outpatient Psychiatrist: No    Social History:  Marital Status:   Children: 2 daughters  Employment Status: retired  Education: medical degree  Special Ed: no   History: no  Housing Status: with daughter currently  Access to Gun: No    Substance Abuse History:  Recreational Drugs: none  Use of Alcohol: no recent use  Rehab History: No  Tobacco Use: No  Legal consequences of chemical use: N/A  Is the patient aware of the biomedical complications associated with substance abuse and mental illness? N/A    Legal History:  Past Charges/Incarcerations: No  Pending Charges: No    Family Psychiatric History:   No    Psychosocial Stressors: health.   Functioning Relationships: good relationship with children    Psychosocial Factors:  Maladaptive or problem behaviors: No    Collateral:   Yes - Daughter provides HPI

## 2018-12-18 NOTE — SUBJECTIVE & OBJECTIVE
Patient History           Medical as of 12/18/2018     Past Medical History     Diagnosis Date Comments Source    Anemia -- -- Provider    Arthritis -- -- Provider    Bilateral hand pain 3/14/2018 -- Provider    Branch retinal vein occlusion, left eye 2/20/2015 -- Provider    Chronic bilateral low back pain without sciatica 3/23/2017 -- Provider    Cognitive communication deficit 12/19/2017 -- Provider    Cystoid macular edema, left eye 2/20/2015 -- Provider    Cystoid macular edema, left eye 2/20/2015 -- Provider    DJD (degenerative joint disease) of cervical spine 5/15/2013 -- Provider    Fatty liver 8/26/2014 -- Provider    Goiter 4/9/2018 -- Provider    Hashimoto's disease -- -- Provider    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 -- Provider    Hypertension -- -- Provider    IBS (irritable bowel syndrome) 6/21/2017 -- Provider    IGT (impaired glucose tolerance) 1/12/2016 -- Provider    Iron deficiency anemia secondary to inadequate dietary iron intake 6/24/2013 -- Provider    Joint pain -- -- Provider    Keratoconjunctivitis sicca of both eyes not specified as Sjogren's 7/29/2016 -- Provider    Leiomyoma of uterus, unspecified 9/16/2014 -- Provider    Long QT interval 6/29/2016 Due to medication (plaquenil)  Provider    Macular edema 1/12/2015 -- Provider    Multinodular goiter 1/12/2016 -- Provider    Neuropathy 8/23/2017 -- Provider    Plaquenil causing adverse effect in therapeutic use 10/7/2016 -- Provider    Pneumococcal vaccine refused 8/17/2016 -- Provider    Pneumonia due to Streptococcus pneumoniae 4/5/2018 -- Provider    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/2015 -- Provider    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 -- Provider    s/p Right Total knee replacement 5/15/2013 -- Provider    Scoliosis of thoracic spine 5/15/2013 -- Provider    Small vessel disease, cerebrovascular 12/28/2017 -- Provider    Stroke -- -- Provider    Vascular dementia 12/6/2017 -- Provider          Pertinent  Negatives     Diagnosis Date Noted Comments Source    Basal cell carcinoma 11/09/2018 -- Provider    Cataract 01/12/2015 -- Provider    Diabetes mellitus 01/12/2015 -- Provider    Melanoma 11/09/2018 -- Provider    Squamous cell carcinoma 11/09/2018 -- Provider                  Surgical as of 12/18/2018     Past Surgical History     Procedure Laterality Date Comments Source    SALIVARY GLAND SURGERY -- -- -- Provider    TONSILLECTOMY -- -- -- Provider    left parotidectomy [Other] -- -- mixed tumor Provider    CATARACT EXTRACTION -- -- -- Provider    COLONOSCOPY N/A 9/29/2015 Procedure: COLONOSCOPY;  Surgeon: FIDELINA Sanchez MD;  Location: [facility] ([address] [place] [address]);  Service: Endoscopy;  Laterality: N/A; Provider    JOINT REPLACEMENT -- -- right knee Provider    KNEE SURGERY Left 12/31/2013 TKR Provider    EYE SURGERY -- -- -- Provider                  Family as of 12/18/2018     Problem Relation Name Age of Onset Comments Source    Hypertension Mother -- -- -- Provider    Heart disease Mother -- -- -- Provider    Prostate cancer Father -- -- prostate cancer Provider    Cancer Father -- -- -- Provider    Breast cancer Maternal Grandmother -- -- -- Provider    Lupus Neg Hx -- -- -- Provider    Psoriasis Neg Hx -- -- -- Provider    Melanoma Neg Hx -- -- -- Provider    Colon cancer Neg Hx -- -- -- Provider            Tobacco Use as of 12/18/2018     Smoking Status Smoking Start Date Smoking Quit Date Packs/Day Years Used    Never Smoker -- -- -- --    Types Comments Smokeless Tobacco Status Smokeless Tobacco Quit Date Source    -- -- Never Used -- Provider            Alcohol Use as of 12/18/2018     Alcohol Use Drinks/Week Alcohol/Week Comments Source    No -- 0.0 oz very seldom  Provider    Frequency Standard Drinks Binge Drinking Source      -- -- -- Provider             Drug Use as of 12/18/2018     Drug Use Types Frequency Comments Source    No -- -- -- Provider            Sexual Activity as of 12/18/2018     Sexually  Active Birth Control Partners Comments Source    No -- -- ,  age 52,  Provider            Activities of Daily Living as of 12/18/2018     Activities of Daily Living Question Response Comments Source    Are you pregnant or think you may be? No -- Provider    Breast-feeding No -- Provider            Social Documentation as of 12/18/2018    None           Occupational as of 12/18/2018    None           Socioeconomic as of 12/18/2018     Marital Status Spouse Name Number of Children Years Education Education Level Preferred Language Ethnicity Race Source     -- -- -- -- English /Black Black or  Provider    Financial Resource Strain Food Insecurity: Worry Food Insecurity: Inability Transportation Needs: Medical Transportation Needs: Non-medical       -- -- -- -- --             Pertinent History     Question Response Comments    Lives with -- --    Place in Birth Order -- --    Lives in -- --    Number of Siblings -- --    Raised by -- --    Legal Involvement -- --    Childhood Trauma -- --    Criminal History of -- --    Financial Status -- --    Highest Level of Education -- --    Does patient have access to a firearm? 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    Hypertension     IBS (irritable bowel syndrome) 6/21/2017    IGT (impaired glucose tolerance) 1/12/2016    Iron deficiency anemia secondary to  inadequate dietary iron intake 6/24/2013    Joint pain     Keratoconjunctivitis sicca of both eyes not specified as Sjogren's 7/29/2016    Leiomyoma of uterus, unspecified 9/16/2014    Long QT interval 6/29/2016    Due to medication (plaquenil)     Macular edema 1/12/2015    Multinodular goiter 1/12/2016    Neuropathy 8/23/2017    Plaquenil causing adverse effect in therapeutic use 10/7/2016    Pneumococcal vaccine refused 8/17/2016    Pneumonia due to Streptococcus pneumoniae 4/5/2018    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/2015    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    s/p Right Total knee replacement 5/15/2013    Scoliosis of thoracic spine 5/15/2013    Small vessel disease, cerebrovascular 12/28/2017    Stroke     Vascular dementia 12/6/2017     Past Surgical History:   Procedure Laterality Date    CATARACT EXTRACTION      COLONOSCOPY N/A 9/29/2015    Procedure: COLONOSCOPY;  Surgeon: FIDELINA Sanchez MD;  Location: [facility] (4TH FLR);  Service: Endoscopy;  Laterality: N/A;    COLONOSCOPY N/A 9/29/2015    Performed by FIDELINA Sanchez MD at [facility] (4TH FLR)    EGD (ESOPHAGOGASTRODUODENOSCOPY) N/A 3/11/2014    Performed by Federico Escobar MD at [facility] (4TH FLR)    EGD (ESOPHAGOGASTRODUODENOSCOPY) N/A 11/5/2013    Performed by Federico Escobar MD at [facility] (4TH FLR)    ESOPHAGOGASTRODUODENOSCOPY (EGD) N/A 10/4/2017    Performed by Mg Morton MD at [facility] ENDO    EYE SURGERY      INJECTION-STEROID-EPIDURAL-TRANSFORAMINAL Right 9/20/2017    Per[facility]elin [facility] PAIN MGT    JOINT REPLACEMENT      right knee    KNEE SURGERY Left 12/31/2013    TKR    left parotidectomy      mixed tumor    SALIVARY GLAND SURGERY      TONSILLECTOMY       Family History     Problem Relation (Age of Onset)    Breast cancer Maternal Grandmother    Cancer Father    Heart disease Mother    Hypertension Mother    Prostate cancer Father        Tobacco Use     Smoking status: Never Smoker    Smokeless tobacco: Never Used   Substance and Sexual Activity    Alcohol use: No     Alcohol/week: 0.0 oz     Comment: very seldom     Drug use: No    Sexual activity: No     Comment: ,  age 52,      Review of patient's allergies indicates:  No Known Allergies    No current facility-administered medications on file prior to encounter.      Current Outpatient Medications on File Prior to Encounter   Medication Sig    acetaminophen (TYLENOL) 500 MG tablet Take 1,000 mg by mouth daily as needed for Pain.    aspirin (ECOTRIN) 81 MG EC tablet Take 81 mg by mouth once daily.    baclofen (LIORESAL) 10 MG tablet Take 1 tablet (10 mg total) by mouth 3 (three) times daily.    carvedilol (COREG) 12.5 MG tablet Take 1 tablet (12.5 mg total) by mouth 2 (two) times daily with meals.    ciclopirox (PENLAC) 8 % Soln Apply to affected nails qhs. Clean nails with alcohol q7 days.    clopidogrel (PLAVIX) 75 mg tablet Take 75 mg by mouth once daily.    diazePAM (VALIUM) 2 MG tablet Take 1 tablet (2 mg total) by mouth as needed for Anxiety.    furosemide (LASIX) 40 MG tablet Take 20 mg by mouth once daily.    gabapentin (NEURONTIN) 300 MG capsule Take 1 capsule (300 mg total) by mouth 3 (three) times daily.    hydroxychloroquine (PLAQUENIL) 200 mg tablet Take 2 tablets (400 mg total) by mouth once daily.    magnesium oxide (MAG-OX) 400 mg tablet Take 400 mg by mouth once daily.    montelukast (SINGULAIR) 10 mg tablet Take 1 tablet (10 mg total) by mouth every evening.    omeprazole (PRILOSEC) 20 MG capsule TAKE 1 CAPSULE DAILY    potassium chloride SA (K-DUR,KLOR-CON) 10 MEQ tablet Take 2 tablets (20 mEq total) by mouth once daily.    predniSONE (DELTASONE) 5 MG tablet Take 1 tablet (5 mg total) by mouth once daily.    sulfamethoxazole-trimethoprim 800-160mg (BACTRIM DS) 800-160 mg Tab Take 1 tablet by mouth once daily.    sulfaSALAzine (AZULFIDINE) 500 MG TbEC Take 2  "tablets (1,000 mg total) by mouth 2 (two) times daily.     Psychotherapeutics (From admission, onward)    Start     Stop Route Frequency Ordered    12/16/18 2032  ramelteon tablet 8 mg      -- Oral Nightly PRN 12/16/18 1933        Review of Systems   Unable to perform ROS: Mental status change     Strengths and Liabilities: Strength: Patient is intelligent., Strength: Patient has positive support network.    Objective:     Vital Signs (Most Recent):  Temp: 98.8 °F (37.1 °C) (12/18/18 1136)  Pulse: 82 (12/18/18 1136)  Resp: 20 (12/18/18 1136)  BP: (!) 185/91 (12/18/18 1136)  SpO2: 95 % (12/18/18 1136) Vital Signs (24h Range):  Temp:  [97.5 °F (36.4 °C)-98.9 °F (37.2 °C)] 98.8 °F (37.1 °C)  Pulse:  [71-93] 82  Resp:  [16-20] 20  SpO2:  [94 %-97 %] 95 %  BP: (134-196)/(60-94) 185/91     Height: 5' 4" (162.6 cm)  Weight: 70.3 kg (155 lb)  Body mass index is 26.61 kg/m².    No intake or output data in the 24 hours ending 12/18/18 1355    Physical Exam   Constitutional: She appears well-developed and well-nourished.   HENT:   Head: Normocephalic and atraumatic.   Eyes: Right eye exhibits no discharge. Left eye exhibits no discharge. No scleral icterus.   Neck: No tracheal deviation present.   Cardiovascular: Normal rate and regular rhythm.   Pulmonary/Chest: Effort normal. No stridor. No respiratory distress.   Abdominal: Soft. She exhibits no distension.   Musculoskeletal: She exhibits no edema.   Skin: Skin is warm and dry.   Nursing note and vitals reviewed.       Mental Status Exam:  Appearance: unremarkable, age appropriate,  female in hospital attire  Behavior/Cooperation: eye contact minimal even immediately after being awoken  Speech: mumbled, few vocalizations noted  Mood: unable to ilicit  Affect: flat  Thought Process: unable to assess  Thought Content: unable to assess currently. patient endorsed to staff prior to my interaction that she had been seeing things (+VH) prior to " "admission  Orientation: unable to assess at this time  Memory: Unable to assess  Attention Span/Concentration: Impaired  Cognition: impaired due to active delirium/drowsiness  Insight: limited by medical condition  Judgment: limited by medical condition    Modified CAM-ICU:  1. Acute change and/or fluctuating course of mental status: Yes  2. Inattention (SAVEAHAART): Patient unable to participate 2/2 drowsiness  · "Squeeze my hand, only when you hear, the letter 'A'."  3. Altered Level of Consciousness: Yes  4. Disorganized Thinking (Errors >1/6): unable to assess  · "Will a stone float on water?"  · "Are there fish in the sea?"  · "Does one pound weigh more than two?"  · "Can you use a hammer to pound a nail?"  · Command(s):  · "Hold up 2 fingers."  · "Now do the same thing with the other hand."    Score: 1+2 AND, either 3 or 4 present =   POSITIVE      Significant Labs:   Last 24 Hours:   Recent Lab Results       12/18/18  0543        Immature Granulocytes 0.4     Immature Grans (Abs) 0.05  Comment:  Mild elevation in immature granulocytes is non specific and   can be seen in a variety of conditions including stress response,   acute inflammation, trauma and pregnancy. Correlation with other   laboratory and clinical findings is essential.       Albumin 3.4     Alkaline Phosphatase 43     ALT 9     Anion Gap 13     Aniso Slight     AST 11     Baso # 0.02     Basophil% 0.2     Total Bilirubin 0.7  Comment:  For infants and newborns, interpretation of results should be based  on gestational age, weight and in agreement with clinical  observations.  Premature Infant recommended reference ranges:  Up to 24 hours.............<8.0 mg/dL  Up to 48 hours............<12.0 mg/dL  3-5 days..................<15.0 mg/dL  6-29 days.................<15.0 mg/dL       BUN, Bld 19     Calcium 9.3     Chloride 106     CO2 24     Creatinine 1.1     Differential Method Automated     eGFR if African American 54.4     eGFR if non "  47.2  Comment:  Calculation used to obtain the estimated glomerular filtration  rate (eGFR) is the CKD-EPI equation.        Eos # 0.1     Eosinophil% 0.8     Glucose 94     Gran # (ANC) 6.6     Gran% 55.6     Hematocrit 43.1     Hemoglobin 13.7     Hypo Occasional     Lymph # 2.9     Lymph% 24.3     Magnesium 2.0     MCH 27.5     MCHC 31.8     MCV 86     Mono # 2.2     Mono% 18.7     MPV 10.8     nRBC 0     Phosphorus 2.5     Platelet Estimate Decreased     Platelets 148     Potassium 3.2     Total Protein 6.6     RBC 4.99     RDW 15.0     Sodium 143     WBC 11.78           Significant Imaging: MRI: I have reviewed all pertinent results/findings within the past 24 hours. Stable, old lacunar infarcts to cerebellum and supratentorial region. No acute process

## 2018-12-19 PROBLEM — G93.40 ACUTE ENCEPHALOPATHY: Status: ACTIVE | Noted: 2018-12-19

## 2018-12-19 LAB
ALBUMIN SERPL BCP-MCNC: 3.9 G/DL
ALLENS TEST: ABNORMAL
ALP SERPL-CCNC: 54 U/L
ALT SERPL W/O P-5'-P-CCNC: 9 U/L
AMMONIA PLAS-SCNC: 26 UMOL/L
ANION GAP SERPL CALC-SCNC: 16 MMOL/L
ANISOCYTOSIS BLD QL SMEAR: SLIGHT
AST SERPL-CCNC: 13 U/L
BACTERIA #/AREA URNS AUTO: NORMAL /HPF
BASOPHILS # BLD AUTO: 0.04 K/UL
BASOPHILS NFR BLD: 0.2 %
BILIRUB DIRECT SERPL-MCNC: 0.5 MG/DL
BILIRUB SERPL-MCNC: 1.5 MG/DL
BILIRUB UR QL STRIP: NEGATIVE
BUN SERPL-MCNC: 17 MG/DL
CALCIUM SERPL-MCNC: 9.9 MG/DL
CHLORIDE SERPL-SCNC: 98 MMOL/L
CLARITY UR REFRACT.AUTO: CLEAR
CO2 SERPL-SCNC: 23 MMOL/L
COLOR UR AUTO: ABNORMAL
CREAT SERPL-MCNC: 1.1 MG/DL
DELSYS: ABNORMAL
DIFFERENTIAL METHOD: ABNORMAL
EOSINOPHIL # BLD AUTO: 0.1 K/UL
EOSINOPHIL NFR BLD: 0.5 %
ERYTHROCYTE [DISTWIDTH] IN BLOOD BY AUTOMATED COUNT: 14.8 %
EST. GFR  (AFRICAN AMERICAN): 54.4 ML/MIN/1.73 M^2
EST. GFR  (NON AFRICAN AMERICAN): 47.2 ML/MIN/1.73 M^2
FOLATE SERPL-MCNC: 39.5 NG/ML
GLUCOSE SERPL-MCNC: 94 MG/DL
GLUCOSE UR QL STRIP: NEGATIVE
HCO3 UR-SCNC: 23.6 MMOL/L (ref 24–28)
HCT VFR BLD AUTO: 48.8 %
HGB BLD-MCNC: 15.6 G/DL
HGB UR QL STRIP: NEGATIVE
HYALINE CASTS UR QL AUTO: 1 /LPF
HYPOCHROMIA BLD QL SMEAR: ABNORMAL
IMM GRANULOCYTES # BLD AUTO: 0.11 K/UL
IMM GRANULOCYTES NFR BLD AUTO: 0.6 %
KETONES UR QL STRIP: ABNORMAL
LEUKOCYTE ESTERASE UR QL STRIP: NEGATIVE
LYMPHOCYTES # BLD AUTO: 4.6 K/UL
LYMPHOCYTES NFR BLD: 24.6 %
MAGNESIUM SERPL-MCNC: 1.7 MG/DL
MCH RBC QN AUTO: 27.8 PG
MCHC RBC AUTO-ENTMCNC: 32 G/DL
MCV RBC AUTO: 87 FL
MICROSCOPIC COMMENT: NORMAL
MONOCYTES # BLD AUTO: 5.2 K/UL
MONOCYTES NFR BLD: 27.6 %
NEUTROPHILS # BLD AUTO: 8.7 K/UL
NEUTROPHILS NFR BLD: 46.5 %
NITRITE UR QL STRIP: NEGATIVE
NRBC BLD-RTO: 0 /100 WBC
PCO2 BLDA: 34.1 MMHG (ref 35–45)
PH SMN: 7.45 [PH] (ref 7.35–7.45)
PH UR STRIP: 7 [PH] (ref 5–8)
PHOSPHATE SERPL-MCNC: 2.7 MG/DL
PLATELET # BLD AUTO: 167 K/UL
PLATELET BLD QL SMEAR: ABNORMAL
PMV BLD AUTO: 10.8 FL
PO2 BLDA: 42 MMHG (ref 40–60)
POC BE: 0 MMOL/L
POC SATURATED O2: 80 % (ref 95–100)
POC TCO2: 25 MMOL/L (ref 24–29)
POCT GLUCOSE: 103 MG/DL (ref 70–110)
POCT GLUCOSE: 121 MG/DL (ref 70–110)
POTASSIUM SERPL-SCNC: 3.6 MMOL/L
PROT SERPL-MCNC: 7.7 G/DL
PROT UR QL STRIP: ABNORMAL
RBC # BLD AUTO: 5.61 M/UL
RBC #/AREA URNS AUTO: 3 /HPF (ref 0–4)
SAMPLE: ABNORMAL
SITE: ABNORMAL
SODIUM SERPL-SCNC: 137 MMOL/L
SP GR UR STRIP: 1.02 (ref 1–1.03)
SQUAMOUS #/AREA URNS AUTO: 0 /HPF
URN SPEC COLLECT METH UR: ABNORMAL
VIT B1 BLD-MCNC: 45 UG/L (ref 38–122)
VIT B12 SERPL-MCNC: 632 PG/ML
WBC # BLD AUTO: 18.63 K/UL
WBC #/AREA URNS AUTO: 2 /HPF (ref 0–5)

## 2018-12-19 PROCEDURE — 82248 BILIRUBIN DIRECT: CPT

## 2018-12-19 PROCEDURE — 25000003 PHARM REV CODE 250: Performed by: STUDENT IN AN ORGANIZED HEALTH CARE EDUCATION/TRAINING PROGRAM

## 2018-12-19 PROCEDURE — 93010 ELECTROCARDIOGRAM REPORT: CPT | Mod: ,,, | Performed by: INTERNAL MEDICINE

## 2018-12-19 PROCEDURE — 85025 COMPLETE CBC W/AUTO DIFF WBC: CPT

## 2018-12-19 PROCEDURE — 63600175 PHARM REV CODE 636 W HCPCS: Performed by: STUDENT IN AN ORGANIZED HEALTH CARE EDUCATION/TRAINING PROGRAM

## 2018-12-19 PROCEDURE — 82746 ASSAY OF FOLIC ACID SERUM: CPT

## 2018-12-19 PROCEDURE — 99900035 HC TECH TIME PER 15 MIN (STAT)

## 2018-12-19 PROCEDURE — 95813 PR EEG, EXTENDED, 61-119 MINS: ICD-10-PCS | Mod: 26,,, | Performed by: PSYCHIATRY & NEUROLOGY

## 2018-12-19 PROCEDURE — 99223 1ST HOSP IP/OBS HIGH 75: CPT | Mod: AI,GC,, | Performed by: INTERNAL MEDICINE

## 2018-12-19 PROCEDURE — 25000003 PHARM REV CODE 250: Performed by: INTERNAL MEDICINE

## 2018-12-19 PROCEDURE — 93010 EKG 12-LEAD: ICD-10-PCS | Mod: ,,, | Performed by: INTERNAL MEDICINE

## 2018-12-19 PROCEDURE — 95813 EEG EXTND MNTR 61-119 MIN: CPT

## 2018-12-19 PROCEDURE — 82607 VITAMIN B-12: CPT

## 2018-12-19 PROCEDURE — 25000003 PHARM REV CODE 250: Performed by: HOSPITALIST

## 2018-12-19 PROCEDURE — 83735 ASSAY OF MAGNESIUM: CPT

## 2018-12-19 PROCEDURE — 99232 PR SUBSEQUENT HOSPITAL CARE,LEVL II: ICD-10-PCS | Mod: ,,, | Performed by: PSYCHIATRY & NEUROLOGY

## 2018-12-19 PROCEDURE — 94761 N-INVAS EAR/PLS OXIMETRY MLT: CPT

## 2018-12-19 PROCEDURE — 81001 URINALYSIS AUTO W/SCOPE: CPT

## 2018-12-19 PROCEDURE — 84100 ASSAY OF PHOSPHORUS: CPT

## 2018-12-19 PROCEDURE — 63600175 PHARM REV CODE 636 W HCPCS: Performed by: HOSPITALIST

## 2018-12-19 PROCEDURE — 86592 SYPHILIS TEST NON-TREP QUAL: CPT

## 2018-12-19 PROCEDURE — 99232 SBSQ HOSP IP/OBS MODERATE 35: CPT | Mod: ,,, | Performed by: PSYCHIATRY & NEUROLOGY

## 2018-12-19 PROCEDURE — 93005 ELECTROCARDIOGRAM TRACING: CPT

## 2018-12-19 PROCEDURE — 20000000 HC ICU ROOM

## 2018-12-19 PROCEDURE — 80053 COMPREHEN METABOLIC PANEL: CPT

## 2018-12-19 PROCEDURE — 63600175 PHARM REV CODE 636 W HCPCS: Performed by: INTERNAL MEDICINE

## 2018-12-19 PROCEDURE — 95813 EEG EXTND MNTR 61-119 MIN: CPT | Mod: 26,,, | Performed by: PSYCHIATRY & NEUROLOGY

## 2018-12-19 PROCEDURE — 82140 ASSAY OF AMMONIA: CPT

## 2018-12-19 PROCEDURE — 36415 COLL VENOUS BLD VENIPUNCTURE: CPT

## 2018-12-19 PROCEDURE — 82803 BLOOD GASES ANY COMBINATION: CPT

## 2018-12-19 PROCEDURE — 99223 PR INITIAL HOSPITAL CARE,LEVL III: ICD-10-PCS | Mod: AI,GC,, | Performed by: INTERNAL MEDICINE

## 2018-12-19 PROCEDURE — 87040 BLOOD CULTURE FOR BACTERIA: CPT

## 2018-12-19 RX ORDER — ACETAMINOPHEN 650 MG/1
650 SUPPOSITORY RECTAL EVERY 6 HOURS PRN
Status: DISCONTINUED | OUTPATIENT
Start: 2018-12-19 | End: 2018-12-28

## 2018-12-19 RX ORDER — VANCOMYCIN HCL IN 5 % DEXTROSE 1G/250ML
15 PLASTIC BAG, INJECTION (ML) INTRAVENOUS ONCE
Status: COMPLETED | OUTPATIENT
Start: 2018-12-19 | End: 2018-12-19

## 2018-12-19 RX ORDER — HYDRALAZINE HYDROCHLORIDE 20 MG/ML
5 INJECTION INTRAMUSCULAR; INTRAVENOUS EVERY 4 HOURS PRN
Status: DISCONTINUED | OUTPATIENT
Start: 2018-12-19 | End: 2018-12-26

## 2018-12-19 RX ORDER — CEFEPIME HYDROCHLORIDE 2 G/1
2 INJECTION, POWDER, FOR SOLUTION INTRAVENOUS
Status: DISCONTINUED | OUTPATIENT
Start: 2018-12-19 | End: 2018-12-20

## 2018-12-19 RX ORDER — ENOXAPARIN SODIUM 100 MG/ML
40 INJECTION SUBCUTANEOUS EVERY 24 HOURS
Status: DISCONTINUED | OUTPATIENT
Start: 2018-12-20 | End: 2018-12-19

## 2018-12-19 RX ORDER — VANCOMYCIN HCL IN 5 % DEXTROSE 1G/250ML
15 PLASTIC BAG, INJECTION (ML) INTRAVENOUS EVERY 24 HOURS
Status: DISCONTINUED | OUTPATIENT
Start: 2018-12-20 | End: 2018-12-21

## 2018-12-19 RX ORDER — METHYLPREDNISOLONE ACETATE 40 MG/ML
20 INJECTION, SUSPENSION INTRA-ARTICULAR; INTRALESIONAL; INTRAMUSCULAR; SOFT TISSUE DAILY
Status: DISCONTINUED | OUTPATIENT
Start: 2018-12-19 | End: 2018-12-19

## 2018-12-19 RX ORDER — LORAZEPAM 2 MG/ML
INJECTION INTRAMUSCULAR
Status: DISPENSED
Start: 2018-12-19 | End: 2018-12-20

## 2018-12-19 RX ADMIN — CEFEPIME 2 G: 2 INJECTION, POWDER, FOR SOLUTION INTRAVENOUS at 10:12

## 2018-12-19 RX ADMIN — POTASSIUM CHLORIDE 10 MEQ: 7.46 INJECTION, SOLUTION INTRAVENOUS at 12:12

## 2018-12-19 RX ADMIN — POTASSIUM CHLORIDE 10 MEQ: 7.46 INJECTION, SOLUTION INTRAVENOUS at 04:12

## 2018-12-19 RX ADMIN — POTASSIUM CHLORIDE 10 MEQ: 7.46 INJECTION, SOLUTION INTRAVENOUS at 01:12

## 2018-12-19 RX ADMIN — ACYCLOVIR SODIUM 520 MG: 50 INJECTION, SOLUTION INTRAVENOUS at 09:12

## 2018-12-19 RX ADMIN — Medication 4.5 G: at 05:12

## 2018-12-19 RX ADMIN — VANCOMYCIN HYDROCHLORIDE 1000 MG: 1 INJECTION, POWDER, LYOPHILIZED, FOR SOLUTION INTRAVENOUS at 03:12

## 2018-12-19 RX ADMIN — CEFEPIME 2 G: 2 INJECTION, POWDER, FOR SOLUTION INTRAVENOUS at 09:12

## 2018-12-19 RX ADMIN — ACETAMINOPHEN 650 MG: 650 SUPPOSITORY RECTAL at 02:12

## 2018-12-19 RX ADMIN — METHYLPREDNISOLONE SODIUM SUCCINATE 20 MG: 40 INJECTION, POWDER, FOR SOLUTION INTRAMUSCULAR; INTRAVENOUS at 09:12

## 2018-12-19 RX ADMIN — FOLIC ACID: 5 INJECTION, SOLUTION INTRAMUSCULAR; INTRAVENOUS; SUBCUTANEOUS at 10:12

## 2018-12-19 RX ADMIN — POTASSIUM CHLORIDE 10 MEQ: 7.46 INJECTION, SOLUTION INTRAVENOUS at 03:12

## 2018-12-19 RX ADMIN — ACYCLOVIR SODIUM 520 MG: 50 INJECTION, SOLUTION INTRAVENOUS at 10:12

## 2018-12-19 NOTE — PROGRESS NOTES
Team: [facility] MED E Shelli Bernstein NP  Admit Date: 12/16/2018  Length of Stay:  LOS: 0 days   LETICIA 12/19/2018  Principal Problem:  Hypertensive encephalopathy     Interval hx :  Patient was hypertensive throughout the day. Patient was not awake enough for morning medications, which seemed to have helped blood pressure control greatly overnight.  Nursing instructed to let the patient rest, with hopeful improvement in lethargy considering patient was awake throughout most of the night, agitated and psychotic. Patient was not allowed to rest much throughout the day due to multiple interruptions. Patient was unable to be provided PO medications, and IV labetalol was ordered but nursing did not receive because medication backordered. Hydralazine PRN once ordered and rapid response nurse was able to come and give to the patient. Unknown to primary team, a rapid response was called on the patient. Dicussed case with ICU fellow. Clonidine patch order per ICU fellow recommendations after hydralazine 10mg given. Neurology note considered, patient is to be moved to step down bed for closer BP monitoring and to ensure a safe decrease in blood pressure.     Patient also had an episode of vomiting after family fed patient (patient was NPO at the time). Chest x-ray pending    Areas of concern/ handoff: blood pressure control     Review of Systems   Unable to assess due to mental status changes        Temp:  [97.9 °F (36.6 °C)-98.5 °F (36.9 °C)]   Pulse:  [69-91]   Resp:  [16-21]   BP: (141-221)/(68-98)   SpO2:  [93 %-97 %]         Physical Exam   Constitutional: She is well-developed, well-nourished, and in no distress. Resting in bed. Nontoxic  Cardiovascular: Normal rate and regular rhythm. No edema noted  Pulmonary/Chest: Effort normal and breath sounds normal.   Abdominal: Soft. Bowel sounds are normal.   Eyes: EOM are normal. Pupils are equal, round, and reactive to light.  Neurological: Patient is lethargic but easily  aroused with verbal stimulation. Able to answer yes and no questions.   Psychiatric: unable to assess due to lethargy         Recent Labs   Lab 12/16/18  1350 12/17/18  0446   WBC 12.15 13.34*   HGB 13.2 12.1   HCT 42.5 39.2   * 130*           Recent Labs   Lab 12/16/18  1350 12/17/18  0446    140   K 3.9 3.6    103   CO2 25 27   BUN 18 24*   CREATININE 1.1 1.2   * 166*   CALCIUM 9.6 8.8   MG 2.1 2.2   PHOS  --  2.5*           Recent Labs   Lab 12/16/18  1350 12/17/18  0446   ALKPHOS 48* 43*   ALT 12 9*   AST 15 10   ALBUMIN 3.6 3.1*   PROT 6.8 6.1   BILITOT 0.8 0.5      No results for input(s): POCTGLUCOSE in the last 168 hours.     Other diagnostic tests       aspirin  81 mg Oral Daily    baclofen  10 mg Oral TID    carvedilol  12.5 mg Oral BID WM    clopidogrel  75 mg Oral Daily    enoxaparin  40 mg Subcutaneous Daily    furosemide  20 mg Oral Daily    furosemide  40 mg Oral Daily    hydroxychloroquine  400 mg Oral Daily    pantoprazole  40 mg Oral Daily    predniSONE  25 mg Oral Daily    sulfamethoxazole-trimethoprim 800-160mg  1 tablet Oral Daily    sulfaSALAzine  1,000 mg Oral BID         Assessment and Plan             Active Hospital Problems     Diagnosis   POA    *Hypertensive encephalopathy [I67.4]   Yes    Thrombocytopenia [D69.6]   Yes    CKD (chronic kidney disease) stage 3, GFR 30-59 ml/min [N18.3]   Yes    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  · Recently tapering down slowly after April hospitalization.       Hypertension, essential [I10]   Yes    Seropositive rheumatoid arthritis of  multiple sites [M05.79]   Yes       Dx 2012 with Dr No, then Zakem  HCQ since 2012  Prednisone 5 mg/d since 2012  Humira one dose April 2018 followed by ICU admission for pnemonia and resp failure          Resolved Hospital Problems   No resolved problems to display.         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 female with rheumatoid arthritis on steroids, Plaquenil and Sulfasalazine, history of stroke on Aspirin/Plavix, and vascular dementia who presents to the emergency department with her daughter because of confusion.  History is obtained from the daughter, as the patient is not able to provide a good history.  The daughter mentions that at baseline, the patient is very independent and is very sharp.  However, the day prior to admission the patient was endorsing some blurred vision, having difficulty eating saying that she could not see her plate, and was having unusual behavior-like sitting on her bedroom floor, claiming that someone moved her bed causing her to fall.  She has been having tangential speech requiring frequent redirection.  Additionally, she was complaining of some right-sided leg pain, which is unusual, as she normally complaints of left-sided leg pain.  The patient denies any numbness or tingling in any of her extremities.  The patient was just started sulfasalazine yesterday, to be additional therapy for her rheumatoid arthritis.  Of note, the patient ran out of her prednisone 2 days prior to admission.  She has been taking 25 mg daily, and took her dose this morning.     The in the emergency department, she was found to have a blood pressure of 220 on arrival.  Daughter reports that her baseline blood pressure runs low, 110-130.  She received 2 doses of Labetalol 20mg IV x 2 with improvement in her blood pressure to the 180s.  Head CT and MRI were negative for any acute process.  Labs were notable for a trop 0.025.  She was admitted to Hospital Medicine for further  management.        Problems Addressed today:     Hypertensive Encephalopathy  Essential HTN  · Reports confusion and blurred vision x 2 days, with  on arrival - reports baseline BP to be 110-130s  · S/p Labetalol 20mg IV x 2 doses with improvement in BP to 180s  · Head CT and MRI negative for acute process  · Source unclear at this time - possible 2/2 Prednisone withdrawal as she was off a moderate dose for 2 days?  · Continue Coreg 12.5mg PO BID  · Continue Lasix 40mg PO daily, 20mg PO afternoon  · Patient would not participate with medication administration this AM due to being tired; see interval history  · Clonidine patch   · Neuro checks  Neurology consulted and suggest  Clinical presentation most consistent with hypertensive encephalopathy, still with labile BP over the past 24 hour  Would recommend 25% in MAP in 24 hours; patient's return to baseline will not correlate with the reduction in MAP and will lag, will continue to observe              - primary team working on inpatient placement to allow for BP control w/ IV medication as patient is currently NPO and has not received their antiHTNsives as of yet   Will obtained extended EEG to ensure no cortical dysfunction is present  Vascular Dementia  · Chronic issue  · Very sharp and independent at baseline per family    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 Giang of Neurology  · Continue Baclofen 10mg PO TID - will continue as do not want to cause Baclofen withdrawal  · Hold Gabapentin for now given AMS     History of Stroke  · Chronic and stable  · Continue Aspirin 81mg PO daily  · Continue Plavix 75mg PO daily     RA  Long Term Use  of Systolic Steroids  Long Term Use of Immunosuppressant Medication  · Follows with Dr. Rouse of Rheumatology  · Continue Prednisone 25mg PO daily  · Continue Sulfasalazine 1g PO BID  · Continue Plaquenil 400mg PO daily  · Continue Bactrim for ppx     Thrombocytopenia  · Plt chronically low  · Monitor for bleeding     CKD Stage 3  · Creatinine 1.1 on admit, around baseline  · Monitor     Benign Hypertensive Heart and Kidney Disease with CKD  · As above     Diet:  NPO  GI PPx:  PPI with steroids  DVT PPx:  Lovenox  Goals of Care:  Full     Disposition: Pending neuro recs and improvement in BP, home in 1-2 days     Time spent in care of the patient (Greater than 1/2 spent in direct face-to-face contact) 36 minutes     Shelli Bernstein NP-[facility] Medicine

## 2018-12-19 NOTE — PROGRESS NOTES
MD Correa came to bedside to assess pt. ABGs and STAT lactic acid, procalcitonin, and BMP ordered. Stated she would place order for a CT.

## 2018-12-19 NOTE — NURSING TRANSFER
Nursing Transfer Note      12/18/2018     Pt to be transport to 345 via transport per NP transport order. To obtain CXR an EEG. Clonidine patch ordered, report given to RN via telephone. Daughter at bedside with belongings in hand. SBP trends 160-180s and remains very lethargic. Still NPO post SLP. Given PRN Zofran 4mg IVP for vomiting of old food earlier. PIV to RAC c/d/i flushed/capped placed per RRT. Did receive earlier hydralazine as order, pulmonologist did round to bedside to speak with med team for recommendations. Awaiting transport arrival for transition of care.

## 2018-12-19 NOTE — PROGRESS NOTES
Pt arrived to room via escort with daughter. Pt very lethargic. Unable to answer questions. Barely can get the pt to open her eyes with touch stimuli. Telemetry monitor applied. Bed in lowest position. Call bell within reach. Vital signs listed in chart. MD Correa notified of pt's status at the moment. Stated she will come and see pt. Rapid response nurse also notified. Will continue to monitor.

## 2018-12-19 NOTE — ASSESSMENT & PLAN NOTE
Long Term Use of Systolic Steroids  · Follows with Dr. Rouse of Rheumatology  · Continue Prednisone 25mg PO daily  · Continue Sulfasalazine 1g PO BID  · Continue Plaquenil 400mg PO daily  · Continue Bactrim for ppx

## 2018-12-19 NOTE — SUBJECTIVE & OBJECTIVE
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    Hypertension     IBS (irritable bowel syndrome) 6/21/2017    IGT (impaired glucose tolerance) 1/12/2016    Iron deficiency anemia secondary to inadequate dietary iron intake 6/24/2013    Joint pain     Keratoconjunctivitis sicca of both eyes not specified as Sjogren's 7/29/2016    Leiomyoma of uterus, unspecified 9/16/2014    Long QT interval 6/29/2016    Due to medication (plaquenil)     Macular edema 1/12/2015    Multinodular goiter 1/12/2016    Neuropathy 8/23/2017    Plaquenil causing adverse effect in therapeutic use 10/7/2016    Pneumococcal vaccine refused 8/17/2016    Pneumonia due to Streptococcus pneumoniae 4/5/2018    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/2015    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    s/p Right Total knee replacement 5/15/2013    Scoliosis of thoracic spine 5/15/2013    Small vessel disease, cerebrovascular 12/28/2017    Stroke     Vascular dementia 12/6/2017       Past Surgical History:   Procedure Laterality Date    CATARACT EXTRACTION      COLONOSCOPY N/A 9/29/2015    Procedure: COLONOSCOPY;  Surgeon: FIDELINA Sanchez MD;  Location: [facility] ([address] [place] [address]);  Service: Endoscopy;  Laterality: N/A;    COLONOSCOPY N/A 9/29/2015    Performed by FIDELINA Sanchez MD at [facility] (4TH FLR)    EGD (ESOPHAGOGASTRODUODENOSCOPY) N/A 3/11/2014    Performed by Federico Escobar MD at [facility] (4TH FLR)    EGD (ESOPHAGOGASTRODUODENOSCOPY) N/A  11/5/2013    Performed by Federico [facility]obar MD at [facility] ENDO (4TH FLR)    ESOPHAGOGASTRODUODENOSCOPY (EGD) N/A 10/4/2017    Performed by Mg Morton MD at [facility] ENDO    EYE SURGERY      INJECTION-STEROID-EPIDURAL-TRANSFORAMINAL Right 9/20/2017    Performed by Joselin Valdez MD at [facility] PAIN MGT    JOINT REPLACEMENT      right knee    KNEE SURGERY Left 12/31/2013    TKR    left parotidectomy      mixed tumor    SALIVARY GLAND SURGERY      TONSILLECTOMY         Review of patient's allergies indicates:  No Known Allergies    Family History     Problem Relation (Age of Onset)    Breast cancer Maternal Grandmother    Cancer Father    Heart disease Mother    Hypertension Mother    Prostate cancer Father        Tobacco Use    Smoking status: Never Smoker    Smokeless tobacco: Never Used   Substance and Sexual Activity    Alcohol use: No     Alcohol/week: 0.0 oz     Comment: very seldom     Drug use: No    Sexual activity: No     Comment: ,  age 52,       Review of Systems   Unable to perform ROS: Mental status change     Objective:     Vital Signs (Most Recent):  Temp: 99.1 °F (37.3 °C) (12/19/18 0240)  Pulse: (!) 127 (12/19/18 0240)  Resp: (!) 37 (12/19/18 0240)  BP: (!) 182/88 (12/19/18 0240)  SpO2: 96 % (12/19/18 0240) Vital Signs (24h Range):  Temp:  [97.5 °F (36.4 °C)-102.6 °F (39.2 °C)] 99.1 °F (37.3 °C)  Pulse:  [] 127  Resp:  [16-37] 37  SpO2:  [93 %-97 %] 96 %  BP: (128-201)/() 182/88   Weight: 72.4 kg (159 lb 9.8 oz)  Body mass index is 28.27 kg/m².      Intake/Output Summary (Last 24 hours) at 12/19/2018 0321  Last data filed at 12/18/2018 2300  Gross per 24 hour   Intake 0 ml   Output 0 ml   Net 0 ml       Physical Exam  General: Well developed, well nourished female in NAD  HEENT: Conjunctiva clear; Oropharynx clear  Neck: No JVD noted, Supple  CV: Normal S1, S2 with no murmurs.  Resp: Lungs CTA Bilaterally, Unlabored  Abdomen: NTND, BS normoactive x4 quads, soft  Extrem: No  cyanosis, clubbing, edema.  Skin: No rashes, lesions, ulcers  Neuro: unable to assess but pt is moving all her extremities.   PSYCH: unable to assess.        Lines/Drains/Airways     Peripheral Intravenous Line                 Peripheral IV - Single Lumen 12/16/18 1342 Right Forearm 2 days              Significant Labs:    CBC/Anemia Profile:  Recent Labs   Lab 12/17/18  0446 12/18/18  0543   WBC 13.34* 11.78   HGB 12.1 13.7   HCT 39.2 43.1   * 148*   MCV 90 86   RDW 14.6* 15.0*        Chemistries:  Recent Labs   Lab 12/17/18  0446 12/18/18  0543 12/18/18  2117    143 138   K 3.6 3.2* 3.0*    106 100   CO2 27 24 24   BUN 24* 19 14   CREATININE 1.2 1.1 1.0   CALCIUM 8.8 9.3 9.8   ALBUMIN 3.1* 3.4*  --    PROT 6.1 6.6  --    BILITOT 0.5 0.7  --    ALKPHOS 43* 43*  --    ALT 9* 9*  --    AST 10 11  --    MG 2.2 2.0  --    PHOS 2.5* 2.5*  --          Significant Imaging:   CT head 12/19/18   - Motion limited examination.  No CT evidence of significant detrimental interval change.  - Chronic microvascular ischemic change.  Remote bilateral cerebellar infarcts.       CT head 12/16/18  - No acute intracranial abnormalities, noting grossly stable sequela of chronic microvascular ischemic change, senescent change, and remote infarcts.      MRI Brain 12/16/18  -No evidence of diffusion restriction suggest acute infarction.  -Stable appearance of old lacunar type infarcts in the cerebellum and in the supratentorial brain.  -Changes of chronic small vessel ischemic disease and cerebral volume loss.  -No acute intracranial process.

## 2018-12-19 NOTE — PROGRESS NOTES
Patient escorted off floor via bed by escort to CT. Patient still lethargic. Peripheral IV intact. Saline locked. No bleeding or hematoma noted. Patient transported on telemetry. Awaiting patient's return.

## 2018-12-19 NOTE — HPI
Ms. Selma Alonzo Lux MD is a 83 y.o. female with rheumatoid arthritis on steroids, Plaquenil and Sulfasalazine, history of stroke on Aspirin/Plavix, and vascular dementia who presents to the emergency department with her daughter because of confusion.  History is obtained from the daughter, as the patient is not able to provide a good history.  The daughter mentions that at baseline, the patient is very independent and is very sharp.  However, the day prior to admission the patient was endorsing some blurred vision, having difficulty eating saying that she could not see her plate, and was having unusual behavior-like sitting on her bedroom floor, claiming that someone moved her bed causing her to fall.  She has been having tangential speech requiring frequent redirection.  Additionally, she was complaining of some right-sided leg pain, which is unusual, as she normally complaints of left-sided leg pain.  The patient denies any numbness or tingling in any of her extremities.  The patient was just started sulfasalazine yesterday, to be additional therapy for her rheumatoid arthritis.  Of note, the patient ran out of her prednisone 2 days prior to admission.  She has been taking 25 mg daily, and took her dose this morning.     The in the emergency department, she was found to have a blood pressure of 220 on arrival.  Daughter reports that her baseline blood pressure runs low, 110-130.  She received 2 doses of Labetalol 20mg IV x 2 with improvement in her blood pressure to the 180s.  Head CT and MRI were negative for any acute process.  Labs were notable for a trop 0.025.  She was admitted to Hospital Medicine for further management.

## 2018-12-19 NOTE — PLAN OF CARE
Problem: SLP Goal  Goal: SLP Goal  Speech Language Pathology Goals  Goals expected to be met by 12/25    1. Pt will participate in ongoing swallow assessment to help determine least restrictive diet      Outcome: Unable to achieve outcome(s) by discharge    Transferred to ICU for higher level of care    Belinda White MS, CCC-SLP  Speech Language Pathologist  Pager: [phone]  Date 12/19/2018

## 2018-12-19 NOTE — PROGRESS NOTES
Patient returned to floor via bed by escort from CT.  peripheral IV intact. No bleeding or hematoma noted. Telemetry monitor continued. Bed in lowest position. Call system within reach. Side rails up x2. Will continue to monitor.

## 2018-12-19 NOTE — ASSESSMENT & PLAN NOTE
· Reports confusion and blurred vision x 2 days, with  on arrival - reports baseline BP to be 110-130s  · S/p Labetalol 20mg IV x 2 doses with improvement in BP to 180s  · Head CT and MRI negative for acute process  · Source unclear at this time - possible 2/2 Prednisone withdrawal as she was off a moderate dose for 2 days?  · Continue Coreg 12.5mg PO BID  · Continue Lasix 40mg PO daily, 20mg PO afternoon  · Neuro checks  · Can consider Neuro consult if continues to remain altered

## 2018-12-19 NOTE — PLAN OF CARE
Problem: Adult Inpatient Plan of Care  Goal: Plan of Care Review  Outcome: Ongoing (interventions implemented as appropriate)  See vital signs and assessments in flowsheets. See below for updates on today's progress.     Pt arrived to unit at ~0215    Pulmonary: 95-98% RA. Snoring respirations.    Cardiovascular: Sinus tachycardia. BP labile.     Neurological: Responsive to pain. Spontaneous movements, but not following commands.    Genitourinary: In and out cath done, ~320cc. Purewick placed after.    Endocrine: Potassium replaced    Patient progressing towards goals as tolerated, plan of care communicated and reviewed with Selma Lux MD and daughter. All concerns addressed, questions answered. Will continue to monitor closely.

## 2018-12-19 NOTE — ASSESSMENT & PLAN NOTE
- unclear etiology at this time.   - likely ddx: hypertensive encephalopathy vs infection vs NCSE vs medication (on Baclofen and Gabapentin at home)  - less likely CNS vascular causes given negative CT head and MRI brain   - BP control, pt unable to take orally. Will consider starting Cardene drip if BP continues to sustain above SBP>180   - could be due to sepsis given pt with high tem 102 and elevated WBC.  But LA and procal. Infectious work up send and started on vanc and Zosyn   - blood glucose is 103  - EEG ordered. Neurology team on board   - PCO2 34.1  - gabapentin was held on admission and and now will hold Baclofen   - if pt continues to be obtunded and EEG negative, may consider getting LP

## 2018-12-19 NOTE — ASSESSMENT & PLAN NOTE
Patient's cognitive status declined from yesterday's interaction to today, no longer speaking/phonating/responding to verbal stimulation.    Seroquel was discontinued 2/2 unable to tolerate PO 2/2 AMS.    No agitation overnight.    If patient's mental status improves to the point where she is able to swallow medications, and agitation is present, would still recommend Seroquel 25mg Q6H PRN.    Psychiatry will sign off at this time. If patient begins to experience agitation/confusion, please re-consult us.

## 2018-12-19 NOTE — PROGRESS NOTES
Rapid response nurse came to bedside. Stated there's no change from earlier as stated in notes. Stated pt lethargic state is r/t hypertension. Instructed to call for any changes. Will continue to monitor.

## 2018-12-19 NOTE — ASSESSMENT & PLAN NOTE
Initial presentation of blurry vision, consistent w/ HTN encephalopathy. Patient waxed and waned from baseline to more confusing during her stay in the observation unit. Was more somnolent on 12/18 and oral medication was stopped 2/2 aspiration risk and some difficulty w/ BP control w/ IV meds due to availability of medication as well as observation status. Had worsening mentation throughout the day and developed a fever ( Tmax 102.6), tachycardia, increased RR and persistently elevated BP.  She was transferred to MICU for increased level of care overnight  on 12/19.    - Antibiotics broadened as well as Acyclovir added for viral coverage. Patient w/ elevated WBC suggestive an infectious etiology, although no source at this time. Patient is immunocompromised 2/2 RA medication ( steroids, DMARD)   - CXR w/ persistent patchy consolidation, although appears more chronic in nature.   - EEG pending and ordered to assess for cortical dysfunction   - Pending EEG results, primary team considering LP   - B1 45, low end of normal, can consider supplementation, although not the etiology of patient's current condition   - Will continue to follow patient.

## 2018-12-19 NOTE — NURSING
Notified team of patient responding in a delayed manner to verbal commands while changing bed linens.

## 2018-12-19 NOTE — PROGRESS NOTES
12/18/18 2320   Vital Signs   Temp (!) 101.8 °F (38.8 °C)   Temp src Oral   Pulse (!) 115   Resp 18   SpO2 97 %   O2 Device (Oxygen Therapy) room air   /87   MAP (mmHg) 101   BP Location Left arm   Patient Position Lying     Pt's vital signs listed above. Clonidine patch arrived. MD Correa notified. Instructed to hold patch tonight. Pt is at CT right now, will recheck temperature upon arrival to room.

## 2018-12-19 NOTE — PROCEDURES
EXTENDED  ELECTROENCEPHALOGRAM  REPORT    Selma Lux MD  [id number]  1935    DATE OF SERVICE: 12/19/2018    DATE OF ADMISSION: 12/16/2018  1:09 PM    ADMITTING/REQUESTING PROVIDER: [place] MD    REASON FOR CONSULT: acute encephalopathy    METHODOLOGY   Electroencephalographic (EEG) recording is with electrodes placed according to the International 10-20 placement system.  Thirty two (32) channels of digital signal (sampling rate of 512/sec) including T1 and T2 was simultaneously recorded from the scalp and may include  EKG, EMG, and/or eye monitors.  Recording band pass was 0.1 to 512 hz.  Digital video recording of the patient is simultaneously recorded with the EEG.  The patient is instructed report clinical symptoms which may occur during the recording session.  EEG and video recording is stored and archived in digital format.  Activation procedures which include photic stimulation, hyperventilation and instructing patients to perform simple task are done in selected patients.   The EEG is displayed on a monitor screen and can be reviewed using different montages.  Computer assisted analysis is employed to detect spike and electrographic seizure activity.   The entire record is submitted for computer analysis.  The entire recording is visually reviewed and the times identified by computer analysis as being spikes or seizures are reviewed again.  Compresses spectral analysis (CSA) is also performed on the activity recorded from each individual channel.  This is displayed as a power display of frequencies from 0 to 30 Hz over time.   The CSA is reviewed looking for asymmetries in power between homologous areas of the scalp and then compared with the original EEG recording.     [employer] software was also utilized in the review of this study.  This software suite analyzes the EEG recording in multiple domains.  Coherence and rhythmicity is computed to identify EEG sections which may contain  organized seizures.  Each channel undergoes analysis to detect presence of spike and sharp waves which have special and morphological characteristic of epileptic activity.  The routine EEG recording is converted from spacial into frequency domain.  This is then displayed comparing homologous areas to identify areas of significant asymmetry.  Algorithm to identify non-cortically generated artifact is used to separate eye movement, EMG and other artifact from the EEG.      RECORDING TIMES  Start on 12:06:45  Stop on 13:20:47    A total of 1 hr and 10 min of EEG recording was obtained.    EEG FINDINGS  Background activity:   The background was continuous and asymmetric charaterized by L hemispheric delta range slowing and lower amplitude. The background rhythm consisted of admixture of frequencies, predominantly medium voltage theta/alpha, that was somewhat disorganized and at times sharply contoured, with underlying delta.     Sleep:  No normal sleep structures clearly seen.    Activation procedures: not performed    Abnormal activity:   Brief duration of generalized rhythmic delta activity at 1hz.     EKG:   EKG was normal sinus rhythm with occasional ectopic beats.     IMPRESSION:     This is an abnormal awake/drowsy EEG recording due to R focal delta slowing and generalized delta/theta slowing.     CLINICAL CORRELATION:  The patient is a 89 year-old female who is being evaluated for acute encephalopathy.  The patient is currently not maintained on any anti-seizure medications. This is an abnormal EEG. There is evidence for R hemispheric cortical dysfunction consistent with a structural lesion and moderate generalized encephalopathy possibly due to toxic/metabolic etiology or medication effect. No seizures were recorded during this study.    Aime Trejo MD  Neurology-Epilepsy Fellow.  [facility].    EEG was reviewed with Dr Trejo and I have edited the report.    CAITLIN Delgado,  MD  Director Emeritus [facility] Director  [facility]

## 2018-12-19 NOTE — SUBJECTIVE & OBJECTIVE
Subjective:     Interval History: Patient w/ worsening mentation, elevated BP, tachycardia, fever and increased resp rate overnight requiring transfer to the ICU.   Lethargic this morning, awakens to voice, moves all extremities w/ noxious stimuli.    Current Neurological Medications:   N/A    Current Facility-Administered Medications   Medication Dose Route Frequency Provider Last Rate Last Dose    acetaminophen suppository 650 mg  650 mg Rectal Q6H PRN Kristen Correa MD   650 mg at 12/19/18 0209    acyclovir (ZOVIRAX) 520 mg in dextrose 5 % 100 mL IVPB  10 mg/kg (Ideal) Intravenous Q12H Dayan Martinez  mL/hr at 12/19/18 0952 520 mg at 12/19/18 0952    ceFEPIme injection 2 g  2 g Intravenous Q12H Dayan Martinez MD   2 g at 12/19/18 0952    dextrose 50 % in water (D50W) injection 25 g  25 g Intravenous PRN Karen Toledo MD        dextrose 50% injection 12.5 g  12.5 g Intravenous PRN Karen Toledo MD        dextrose 50% injection 12.5 g  12.5 g Intravenous PRN MERARY Anguiano MD        glucagon (human recombinant) injection 1 mg  1 mg Intramuscular PRN Karen Toledo MD        hydrALAZINE injection 5 mg  5 mg Intravenous Q4H PRN Rogelio Mijares MD        methylPREDNISolone sodium succinate injection 20 mg  20 mg Intravenous Daily Rogelio Mijares MD   20 mg at 12/19/18 0952    ondansetron injection 4 mg  4 mg Intravenous Q6H PRN Shelli Bernstein NP   4 mg at 12/18/18 1845    sodium chloride 0.9% flush 5 mL  5 mL Intravenous PRN Karen Toledo MD        [START ON 12/20/2018] vancomycin in dextrose 5 % 1 gram/250 mL IVPB 1,000 mg  15 mg/kg Intravenous Daily Dayan Martinez MD           Review of Systems   Unable to perform ROS: Acuity of condition     Objective:     Vital Signs (Most Recent):  Temp: 100 °F (37.8 °C) (12/19/18 0415)  Pulse: 104 (12/19/18 1000)  Resp: (!) 25 (12/19/18 1000)  BP: 127/72 (12/19/18 1000)  SpO2: (!)  94 % (12/19/18 1000) Vital Signs (24h Range):  Temp:  [97.7 °F (36.5 °C)-102.6 °F (39.2 °C)] 100 °F (37.8 °C)  Pulse:  [] 104  Resp:  [18-37] 25  SpO2:  [93 %-97 %] 94 %  BP: (122-201)/() 127/72     Weight: 72.4 kg (159 lb 9.8 oz)  Body mass index is 28.27 kg/m².    Physical Exam  General:  Well-developed, well-nourished, awakens to voice only, somnolent, doesn't follow commands  HEENT:  NCAT, PERRLA, oropharyngeal membranes non-erythematous/without exudate  Neck:  Supple, normal ROM without nuchal rigidity  Resp:  Symmetric expansion, no increased wob  CVS:  No LE edema, peripheral pulses 2+ (radial, dorsalis pedis)  GI:  Abd soft, non-distended, non-tender to palpation  Neurologic Exam:  Mental Status:  Letahrgic, unable to access   Cranial Nerves:  PERRLA, R gaze preference but able to overcome w/ occulo-cephalic maneuvers and also at times she crosses midline on her own. Difficult to assess 2/2 patient's condition .  Motor:  Normal bulk and tone.  Moves all extremities w./ noxious stimuli  Sensory: Noxious stimuli only, unable to assess light touch   Reflexes:  Biceps, brachioradialis, patellar, Achilles 1+ and symmetric.   Coordination: No resting tremor.  Gait: Deferred         Significant Labs:   Hemoglobin A1c: No results for input(s): HGBA1C in the last 720 hours.  Blood Culture: No results for input(s): LABBLOO in the last 48 hours.  BMP:   Recent Labs   Lab 12/18/18  0543 12/18/18 2117 12/19/18  0300   GLU 94 104 94    138 137   K 3.2* 3.0* 3.6    100 98   CO2 24 24 23   BUN 19 14 17   CREATININE 1.1 1.0 1.1   CALCIUM 9.3 9.8 9.9   MG 2.0  --  1.7     CBC:   Recent Labs   Lab 12/18/18  0543 12/19/18  0300   WBC 11.78 18.63*   HGB 13.7 15.6   HCT 43.1 48.8*   * 167     CMP:   Recent Labs   Lab 12/18/18  0543 12/18/18  2117 12/19/18  0300   GLU 94 104 94    138 137   K 3.2* 3.0* 3.6    100 98   CO2 24 24 23   BUN 19 14 17   CREATININE 1.1 1.0 1.1   CALCIUM 9.3 9.8  9.9   MG 2.0  --  1.7   PROT 6.6  --  7.7   ALBUMIN 3.4*  --  3.9   BILITOT 0.7  --  1.5*   ALKPHOS 43*  --  54*   AST 11  --  13   ALT 9*  --  9*   ANIONGAP 13 14 16   EGFRNONAA 47.2* 53.0* 47.2*     CSF Culture: No results for input(s): CSFCULTURE in the last 48 hours.  CSF Studies: No results for input(s): ALIQUT, APPEARCSF, COLORCSF, CSFWBC, CSFRBC, GLUCCSF, LDHCSF, PROTEINCSF, VDRLCSF in the last 48 hours.  Inflammatory Markers:   Recent Labs   Lab 12/18/18  2117   PROCAL 0.17     POCT Glucose:   Recent Labs   Lab 12/19/18  0046 12/19/18  1102   POCTGLUCOSE 103 121*     Prealbumin: No results for input(s): PREALBUMIN in the last 48 hours.  Respiratory Culture: No results for input(s): GSRESP, RESPIRATORYC in the last 48 hours.  Urine Culture: No results for input(s): LABURIN in the last 48 hours.  Urine Studies:   Recent Labs   Lab 12/19/18  0500   COLORU Sissy   APPEARANCEUA Clear   PHUR 7.0   SPECGRAV 1.020   PROTEINUA 2+*   GLUCUA Negative   KETONESU 1+*   BILIRUBINUA Negative   OCCULTUA Negative   NITRITE Negative   LEUKOCYTESUR Negative   RBCUA 3   WBCUA 2   BACTERIA Rare   SQUAMEPITHEL 0   HYALINECASTS 1     All pertinent lab results from the past 24 hours have been reviewed.    Significant Imaging: I have reviewed all pertinent imaging results/findings within the past 24 hours.   Awaiting EEG placement and results

## 2018-12-19 NOTE — HPI
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 female with rheumatoid arthritis on steroids, Plaquenil and Sulfasalazine, history of stroke on Aspirin/Plavix, and vascular dementia who presents to the emergency department 12/16/18 with her daughter because of confusion.  History is obtained from the daughter, as the patient is not able to provide a good history.  The daughter mentions that at baseline, the patient is very independent and is very sharp.  However, the day prior to admission the patient was endorsing some blurred vision, having difficulty eating saying that she could not see her plate, and was having unusual behavior-like sitting on her bedroom floor, claiming that someone moved her bed causing her to fall.  She has been having tangential speech requiring frequent redirection.  Additionally, she was complaining of some right-sided leg pain, which is unusual, as she normally complaints of left-sided leg pain.  The patient denies any numbness or tingling in any of her extremities. Of note, the patient ran out of her prednisone 2 days prior to admission.  She has been taking 25 mg daily.     The in the emergency department, she was found to have a blood pressure of 220 on arrival.  Daughter reports that her baseline blood pressure runs low, 110-130. Reports that pt is complaint with her medications.   She received 2 doses of Labetalol 20mg IV x 2 with improvement in her blood pressure to the 180s.  Head CT and MRI in the ED were negative for any acute process.

## 2018-12-19 NOTE — PROGRESS NOTES
[facility]  Neurology  Progress Note    Patient Name: Selma Rosado MD Jose J  MRN: [id number]  Admission Date: 12/16/2018  Hospital Length of Stay: 0 days  Code Status: Full Code   Attending Provider: Rogelio Mijares MD  Primary Care Physician: Bhargav Hirsch MD   Principal Problem:Acute encephalopathy      Subjective:     Interval History: Patient w/ worsening mentation, elevated BP, tachycardia, fever and increased resp rate overnight requiring transfer to the ICU.   Lethargic this morning, awakens to voice, moves all extremities w/ noxious stimuli.    Current Neurological Medications:   N/A    Current Facility-Administered Medications   Medication Dose Route Frequency Provider Last Rate Last Dose    acetaminophen suppository 650 mg  650 mg Rectal Q6H PRN Kristen Correa MD   650 mg at 12/19/18 0209    acyclovir (ZOVIRAX) 520 mg in dextrose 5 % 100 mL IVPB  10 mg/kg (Ideal) Intravenous Q12H Dayan Martinez  mL/hr at 12/19/18 0952 520 mg at 12/19/18 0952    ceFEPIme injection 2 g  2 g Intravenous Q12H Dayan Martinez MD   2 g at 12/19/18 0952    dextrose 50 % in water (D50W) injection 25 g  25 g Intravenous PRN Karen Toledo MD        dextrose 50% injection 12.5 g  12.5 g Intravenous PRN Karen Toledo MD        dextrose 50% injection 12.5 g  12.5 g Intravenous PRN MERARY Anguiano MD        glucagon (human recombinant) injection 1 mg  1 mg Intramuscular PRN Karen Toledo MD        hydrALAZINE injection 5 mg  5 mg Intravenous Q4H PRN Rogelio Mijares MD        methylPREDNISolone sodium succinate injection 20 mg  20 mg Intravenous Daily Rogelio Mijares MD   20 mg at 12/19/18 0952    ondansetron injection 4 mg  4 mg Intravenous Q6H PRN Shelli Bernstein NP   4 mg at 12/18/18 1845    sodium chloride 0.9% flush 5 mL  5 mL Intravenous PRN Karen Toledo MD        [START ON 12/20/2018] vancomycin in dextrose 5 % 1  gram/250 mL IVPB 1,000 mg  15 mg/kg Intravenous Daily Dayan Martinez MD           Review of Systems   Unable to perform ROS: Acuity of condition     Objective:     Vital Signs (Most Recent):  Temp: 100 °F (37.8 °C) (12/19/18 0415)  Pulse: 104 (12/19/18 1000)  Resp: (!) 25 (12/19/18 1000)  BP: 127/72 (12/19/18 1000)  SpO2: (!) 94 % (12/19/18 1000) Vital Signs (24h Range):  Temp:  [97.7 °F (36.5 °C)-102.6 °F (39.2 °C)] 100 °F (37.8 °C)  Pulse:  [] 104  Resp:  [18-37] 25  SpO2:  [93 %-97 %] 94 %  BP: (122-201)/() 127/72     Weight: 72.4 kg (159 lb 9.8 oz)  Body mass index is 28.27 kg/m².    Physical Exam  General:  Well-developed, well-nourished, awakens to voice only, somnolent, doesn't follow commands  HEENT:  NCAT, PERRLA, oropharyngeal membranes non-erythematous/without exudate  Neck:  Supple, normal ROM without nuchal rigidity  Resp:  Symmetric expansion, no increased wob  CVS:  No LE edema, peripheral pulses 2+ (radial, dorsalis pedis)  GI:  Abd soft, non-distended, non-tender to palpation  Neurologic Exam:  Mental Status:  Letahrgic, unable to access   Cranial Nerves:  PERRLA, R gaze preference but able to overcome w/ occulo-cephalic maneuvers and also at times she crosses midline on her own. Difficult to assess 2/2 patient's condition .  Motor:  Normal bulk and tone.  Moves all extremities w./ noxious stimuli  Sensory: Noxious stimuli only, unable to assess light touch   Reflexes:  Biceps, brachioradialis, patellar, Achilles 1+ and symmetric.   Coordination: No resting tremor.  Gait: Deferred         Significant Labs:   Hemoglobin A1c: No results for input(s): HGBA1C in the last 720 hours.  Blood Culture: No results for input(s): LABBLOO in the last 48 hours.  BMP:   Recent Labs   Lab 12/18/18  0543 12/18/18 2117 12/19/18  0300   GLU 94 104 94    138 137   K 3.2* 3.0* 3.6    100 98   CO2 24 24 23   BUN 19 14 17   CREATININE 1.1 1.0 1.1   CALCIUM 9.3 9.8 9.9   MG 2.0  --   1.7     CBC:   Recent Labs   Lab 12/18/18  0543 12/19/18  0300   WBC 11.78 18.63*   HGB 13.7 15.6   HCT 43.1 48.8*   * 167     CMP:   Recent Labs   Lab 12/18/18  0543 12/18/18 2117 12/19/18  0300   GLU 94 104 94    138 137   K 3.2* 3.0* 3.6    100 98   CO2 24 24 23   BUN 19 14 17   CREATININE 1.1 1.0 1.1   CALCIUM 9.3 9.8 9.9   MG 2.0  --  1.7   PROT 6.6  --  7.7   ALBUMIN 3.4*  --  3.9   BILITOT 0.7  --  1.5*   ALKPHOS 43*  --  54*   AST 11  --  13   ALT 9*  --  9*   ANIONGAP 13 14 16   EGFRNONAA 47.2* 53.0* 47.2*     CSF Culture: No results for input(s): CSFCULTURE in the last 48 hours.  CSF Studies: No results for input(s): ALIQUT, APPEARCSF, COLORCSF, CSFWBC, CSFRBC, GLUCCSF, LDHCSF, PROTEINCSF, VDRLCSF in the last 48 hours.  Inflammatory Markers:   Recent Labs   Lab 12/18/18 2117   PROCAL 0.17     POCT Glucose:   Recent Labs   Lab 12/19/18  0046 12/19/18  1102   POCTGLUCOSE 103 121*     Prealbumin: No results for input(s): PREALBUMIN in the last 48 hours.  Respiratory Culture: No results for input(s): GSRESP, RESPIRATORYC in the last 48 hours.  Urine Culture: No results for input(s): LABURIN in the last 48 hours.  Urine Studies:   Recent Labs   Lab 12/19/18  0500   COLORU Sissy   APPEARANCEUA Clear   PHUR 7.0   SPECGRAV 1.020   PROTEINUA 2+*   GLUCUA Negative   KETONESU 1+*   BILIRUBINUA Negative   OCCULTUA Negative   NITRITE Negative   LEUKOCYTESUR Negative   RBCUA 3   WBCUA 2   BACTERIA Rare   SQUAMEPITHEL 0   HYALINECASTS 1     All pertinent lab results from the past 24 hours have been reviewed.    Significant Imaging: I have reviewed all pertinent imaging results/findings within the past 24 hours.   Awaiting EEG placement and results     Assessment and Plan:     * Acute encephalopathy    Initial presentation of blurry vision, consistent w/ HTN encephalopathy. Patient waxed and waned from baseline to more confusing during her stay in the observation unit. Was more somnolent on 12/18 and  oral medication was stopped 2/2 aspiration risk and some difficulty w/ BP control w/ IV meds due to availability of medication as well as observation status. Had worsening mentation throughout the day and developed a fever ( Tmax 102.6), tachycardia, increased RR and persistently elevated BP.  She was transferred to MICU for increased level of care overnight  on 12/19.    - Antibiotics broadened as well as Acyclovir added for viral coverage. Patient w/ elevated WBC suggestive an infectious etiology, although no source at this time. Patient is immunocompromised 2/2 RA medication ( steroids, DMARD)   - CXR w/ persistent patchy consolidation, although appears more chronic in nature.   - EEG pending and ordered to assess for cortical dysfunction   - Pending EEG results, primary team considering LP   - B1 45, low end of normal, can consider supplementation, although not the etiology of patient's current condition   - Will continue to follow patient.      History of stroke    - CTH w/o acute intracranial changes on repeat imaging on 12/18     Vascular dementia    - At baseline     Hypertension, essential    - Management per primary team          VTE Risk Mitigation (From admission, onward)        Ordered     IP VTE HIGH RISK PATIENT  Once      12/16/18 1933     Place DIMPLE hose  Until discontinued      12/16/18 1821          Nereida English MD  Neurology  [facility]

## 2018-12-19 NOTE — ASSESSMENT & PLAN NOTE
· Reports confusion and blurred vision x 2 days, with  on arrival - reports baseline BP to be 110-130s  · S/p Labetalol 20mg IV x 2 doses with improvement in BP to 180s  · Head CT and MRI negative for acute process  · Takes Coreg 12.5mg PO BID at home. Increase to 25 ng BID this admission   · Since pt is not safe to take orally at this time, we have low threshold to start Cardene drip for BP control if needed

## 2018-12-19 NOTE — H&P
[facility]  Critical Care Medicine  History & Physical    Patient Name: Selma Alonzo Lux MD  MRN: [id number]  Admission Date: 12/16/2018  Hospital Length of Stay: 0 days  Code Status: Full Code  Attending Physician: Theresa Prieto MD   Primary Care Provider: Bhargav Hirsch MD   Principal Problem: Acute encephalopathy    Subjective:     HPI:  Ms. Hanh Alonzo Lux MD is a 83 y.o. female with rheumatoid arthritis on steroids, Plaquenil and Sulfasalazine, history of stroke on Aspirin/Plavix, and vascular dementia who presents to the emergency department 12/16/18 with her daughter because of confusion.  History is obtained from the daughter, as the patient is not able to provide a good history.  The daughter mentions that at baseline, the patient is very independent and is very sharp.  However, the day prior to admission the patient was endorsing some blurred vision, having difficulty eating saying that she could not see her plate, and was having unusual behavior-like sitting on her bedroom floor, claiming that someone moved her bed causing her to fall.  She has been having tangential speech requiring frequent redirection.  Additionally, she was complaining of some right-sided leg pain, which is unusual, as she normally complaints of left-sided leg pain.  The patient denies any numbness or tingling in any of her extremities. Of note, the patient ran out of her prednisone 2 days prior to admission.  She has been taking 25 mg daily.     The in the emergency department, she was found to have a blood pressure of 220 on arrival.  Daughter reports that her baseline blood pressure runs low, 110-130. Reports that pt is complaint with her medications.   She received 2 doses of Labetalol 20mg IV x 2 with improvement in her blood pressure to the 180s.  Head CT and MRI in the ED were negative for any acute process.     ICU was consulted for altered mental status and obtundation. Pt's daughter said  that since pt admitted to the hospital, she was confused but able to talk. Started in the last 24 hs, pt became more lethargic and obtunded. Pt was evaluated by neurology during this admission and they think her clinical picture is due to hypertensive emergency and usually clinical improvement lag after BP improvement. Also, pt's daughter that pt had some cough when she was drinking juice.  Pt was brought to the ICU for airway watch.          Hospital/ICU Course:  No notes on file     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    Hypertension     IBS (irritable bowel syndrome) 6/21/2017    IGT (impaired glucose tolerance) 1/12/2016    Iron deficiency anemia secondary to inadequate dietary iron intake 6/24/2013    Joint pain     Keratoconjunctivitis sicca of both eyes not specified as Sjogren's 7/29/2016    Leiomyoma of uterus, unspecified 9/16/2014    Long QT interval 6/29/2016    Due to medication (plaquenil)     Macular edema 1/12/2015    Multinodular goiter 1/12/2016    Neuropathy 8/23/2017    Plaquenil causing adverse effect in therapeutic use 10/7/2016    Pneumococcal vaccine refused 8/17/2016    Pneumonia due to Streptococcus pneumoniae 4/5/2018    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/2015    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    s/p Right Total knee replacement 5/15/2013    Scoliosis of thoracic spine 5/15/2013    Small vessel disease, cerebrovascular 12/28/2017    Stroke     Vascular dementia  12/6/2017       Past Surgical History:   Procedure Laterality Date    CATARACT EXTRACTION      COLONOSCOPY N/A 9/29/2015    Procedure: COLONOSCOPY;  Surgeon: FIDELINA Sanchez MD;  Location: [facility] (4TH FLR);  Service: Endoscopy;  Laterality: N/A;    COLONOSCOPY N/A 9/29/2015    Performed by FIDELINA Sanchez MD at [facility] ENDO (4TH FLR)    EGD (ESOPHAGOGASTRODUODENOSCOPY) N/A 3/11/2014    Performed by Federico Escobar MD at [facility] ENDO (4TH FLR)    EGD (ESOPHAGOGASTRODUODENOSCOPY) N/A 11/5/2013    Performed by Federico Escobar MD at [facility] (4TH FLR)    ESOPHAGOGASTRODUODENOSCOPY (EGD) N/A 10/4/2017    Performed by Mg Morton MD at [facility] ENDO    EYE SURGERY      INJECTION-STEROID-EPIDURAL-TRANSFORAMINAL Right 9/20/2017    Performed by Joselin Valdez MD at [facility] PAIN MGT    JOINT REPLACEMENT      right knee    KNEE SURGERY Left 12/31/2013    TKR    left parotidectomy      mixed tumor    SALIVARY GLAND SURGERY      TONSILLECTOMY         Review of patient's allergies indicates:  No Known Allergies    Family History     Problem Relation (Age of Onset)    Breast cancer Maternal Grandmother    Cancer Father    Heart disease Mother    Hypertension Mother    Prostate cancer Father        Tobacco Use    Smoking status: Never Smoker    Smokeless tobacco: Never Used   Substance and Sexual Activity    Alcohol use: No     Alcohol/week: 0.0 oz     Comment: very seldom     Drug use: No    Sexual activity: No     Comment: ,  age 52,       Review of Systems   Unable to perform ROS: Mental status change     Objective:     Vital Signs (Most Recent):  Temp: 99.1 °F (37.3 °C) (12/19/18 0240)  Pulse: (!) 127 (12/19/18 0240)  Resp: (!) 37 (12/19/18 0240)  BP: (!) 182/88 (12/19/18 0240)  SpO2: 96 % (12/19/18 0240) Vital Signs (24h Range):  Temp:  [97.5 °F (36.4 °C)-102.6 °F (39.2 °C)] 99.1 °F (37.3 °C)  Pulse:  [] 127  Resp:  [16-37] 37  SpO2:  [93 %-97 %] 96 %  BP: (128-201)/() 182/88   Weight: 72.4 kg  (159 lb 9.8 oz)  Body mass index is 28.27 kg/m².      Intake/Output Summary (Last 24 hours) at 12/19/2018 0321  Last data filed at 12/18/2018 2300  Gross per 24 hour   Intake 0 ml   Output 0 ml   Net 0 ml       Physical Exam  General: Well developed, well nourished female in NAD  HEENT: Conjunctiva clear; Oropharynx clear  Neck: No JVD noted, Supple  CV: Normal S1, S2 with no murmurs.  Resp: Lungs CTA Bilaterally, Unlabored  Abdomen: NTND, BS normoactive x4 quads, soft  Extrem: No cyanosis, clubbing, edema.  Skin: No rashes, lesions, ulcers  Neuro: unable to assess but pt is moving all her extremities.   PSYCH: unable to assess.        Lines/Drains/Airways     Peripheral Intravenous Line                 Peripheral IV - Single Lumen 12/16/18 1342 Right Forearm 2 days              Significant Labs:    CBC/Anemia Profile:  Recent Labs   Lab 12/17/18  0446 12/18/18  0543   WBC 13.34* 11.78   HGB 12.1 13.7   HCT 39.2 43.1   * 148*   MCV 90 86   RDW 14.6* 15.0*        Chemistries:  Recent Labs   Lab 12/17/18  0446 12/18/18  0543 12/18/18  2117    143 138   K 3.6 3.2* 3.0*    106 100   CO2 27 24 24   BUN 24* 19 14   CREATININE 1.2 1.1 1.0   CALCIUM 8.8 9.3 9.8   ALBUMIN 3.1* 3.4*  --    PROT 6.1 6.6  --    BILITOT 0.5 0.7  --    ALKPHOS 43* 43*  --    ALT 9* 9*  --    AST 10 11  --    MG 2.2 2.0  --    PHOS 2.5* 2.5*  --          Significant Imaging:   CT head 12/19/18   - Motion limited examination.  No CT evidence of significant detrimental interval change.  - Chronic microvascular ischemic change.  Remote bilateral cerebellar infarcts.       CT head 12/16/18  - No acute intracranial abnormalities, noting grossly stable sequela of chronic microvascular ischemic change, senescent change, and remote infarcts.      MRI Brain 12/16/18  -No evidence of diffusion restriction suggest acute infarction.  -Stable appearance of old lacunar type infarcts in the cerebellum and in the supratentorial brain.  -Changes  of chronic small vessel ischemic disease and cerebral volume loss.  -No acute intracranial process.    Assessment/Plan:     Neuro   * Acute encephalopathy    - unclear etiology at this time.   - likely ddx: hypertensive encephalopathy vs infection vs NCSE vs medication (on Baclofen and Gabapentin at home)  - less likely CNS vascular causes given negative CT head and MRI brain   - BP control, pt unable to take orally. Will consider starting Cardene drip if BP continues to sustain above SBP>180   - could be due to sepsis given pt with high tem 102 and elevated WBC.  But LA and procal. Infectious work up send and started on vanc and Zosyn   - blood glucose is 103  - EEG ordered. Neurology team on board   - PCO2 34.1  - gabapentin was held on admission and and now will hold Baclofen   - if pt continues to be obtunded and EEG negative, may consider getting LP      Dystonia    · Follows with Dr. Giang of Neurology  · Hold Gabapentin and Baclofen  for now given AMS     History of stroke    · Chronic and stable  · Continue Aspirin 81mg PO daily  · Continue Plavix 75mg PO daily           Vascular dementia    · Chronic issue  · Very sharp and independent at baseline per family           Cardiac/Vascular   Hypertension, essential    · Reports confusion and blurred vision x 2 days, with  on arrival - reports baseline BP to be 110-130s  · S/p Labetalol 20mg IV x 2 doses with improvement in BP to 180s  · Head CT and MRI negative for acute process  · Takes Coreg 12.5mg PO BID at home. Increase to 25 ng BID this admission   · Since pt is not safe to take orally at this time, we have low threshold to start Cardene drip for BP control if needed        Hematology   Thrombocytopenia    · Plt chronically low  · Monitor for bleeding     Other   Long-term use of immunosuppressant medication    Long Term Use of Systolic Steroids  · Follows with Dr. Rouse of Rheumatology  · Continue Prednisone 25mg PO daily  · Continue  Sulfasalazine 1g PO BID  · Continue Plaquenil 400mg PO daily  · Continue Bactrim for ppx             Critical Care Daily Checklist:    A: Awake: RASS Goal/Actual Goal:    Actual: Monzon Agitation Sedation Scale (RASS): Deep sedation   B: Spontaneous Breathing Trial Performed?     C: SAT & SBT Coordinated?  na                      D: Delirium: CAM-ICU Overall CAM-ICU: Positive   E: Early Mobility Performed? No   F: Feeding Goal:    Status:     Current Diet Order   Procedures    Diet NPO      AS: Analgesia/Sedation na   T: Thromboembolic Prophylaxis Enoxaparin    H: HOB > 300 Yes   U: Stress Ulcer Prophylaxis (if needed) PPI   G: Glucose Control ISS   B: Bowel Function     I: Indwelling Catheter (Lines & Hill) Necessity PIV   D: De-escalation of Antimicrobials/Pharmacotherapies no    Plan for the day/ETD Abx , Bcx, CXR and EEG    Code Status:  Family/Goals of Care: Full Code         Critical secondary to Patient has a condition that poses threat to life and bodily function: acute encephalopathy      Critical care was time spent personally by me on the following activities: development of treatment plan with patient or surrogate and bedside caregivers, discussions with consultants, evaluation of patient's response to treatment, examination of patient, ordering and performing treatments and interventions, ordering and review of laboratory studies, ordering and review of radiographic studies, pulse oximetry, re-evaluation of patient's condition. This critical care time did not overlap with that of any other provider or involve time for any procedures.       Rosana Wright MD  Critical Care Medicine  [facility]

## 2018-12-19 NOTE — PT/OT/SLP PROGRESS
Speech Language Pathology/ Discharge Summary       Selma Lux MD  MRN: [id number]    Patient transferred to ICU for higher level of care. Current therapy orders will be discontinued at this time and team to re-consult once medically appropriate.    Belinda White CCC-SLP

## 2018-12-19 NOTE — SUBJECTIVE & OBJECTIVE
"Interval History: see hospital course    Family History     Problem Relation (Age of Onset)    Breast cancer Maternal Grandmother    Cancer Father    Heart disease Mother    Hypertension Mother    Prostate cancer Father        Tobacco Use    Smoking status: Never Smoker    Smokeless tobacco: Never Used   Substance and Sexual Activity    Alcohol use: No     Alcohol/week: 0.0 oz     Comment: very seldom     Drug use: No    Sexual activity: No     Comment: ,  age 52,      Psychotherapeutics (From admission, onward)    None           Review of Systems   Unable to perform ROS: Patient nonverbal     Objective:     Vital Signs (Most Recent):  Temp: 100 °F (37.8 °C) (12/19/18 0415)  Pulse: 109 (12/19/18 1100)  Resp: (!) 22 (12/19/18 1100)  BP: (!) 146/71 (12/19/18 1100)  SpO2: 95 % (12/19/18 1100) Vital Signs (24h Range):  Temp:  [97.7 °F (36.5 °C)-102.6 °F (39.2 °C)] 100 °F (37.8 °C)  Pulse:  [] 109  Resp:  [18-37] 22  SpO2:  [93 %-97 %] 95 %  BP: (122-201)/() 146/71     Height: 5' 3" (160 cm)  Weight: 72.4 kg (159 lb 9.8 oz)  Body mass index is 28.27 kg/m².      Intake/Output Summary (Last 24 hours) at 12/19/2018 1155  Last data filed at 12/19/2018 1147  Gross per 24 hour   Intake 1060 ml   Output 320 ml   Net 740 ml       Mental Status Exam:  Appearance: unremarkable, age appropriate, lying in bed  Behavior/Cooperation: no participatation, somnolent  Speech: mute  Mood: unable to ilicit  Affect: flat  Thought Process: unable to assess  Thought Content: unable to assess  Orientation: unable to assess  Memory: Unable to assess  Attention Span/Concentration: unable to assess  Cognition: unable to assess  Insight: unable to assess  Judgment: unable to assess      Significant Labs:   Last 24 Hours:   Recent Lab Results       12/19/18  1102   12/19/18  0500   12/19/18  0317   12/19/18  0300   12/19/18  0046        Immature Granulocytes       0.6       Immature Grans (Abs)       0.11  Comment:  Mild " elevation in immature granulocytes is non specific and   can be seen in a variety of conditions including stress response,   acute inflammation, trauma and pregnancy. Correlation with other   laboratory and clinical findings is essential.         Procalcitonin               Albumin       3.9       Alkaline Phosphatase       54       Allens Test     N/A         ALT       9       Anion Gap       16       Aniso       Slight       Appearance, UA   Clear           AST       13       Bacteria, UA   Rare           Baso #       0.04       Basophil%       0.2       Bilirubin (UA)   Negative           Total Bilirubin       1.5  Comment:  For infants and newborns, interpretation of results should be based  on gestational age, weight and in agreement with clinical  observations.  Premature Infant recommended reference ranges:  Up to 24 hours.............<8.0 mg/dL  Up to 48 hours............<12.0 mg/dL  3-5 days..................<15.0 mg/dL  6-29 days.................<15.0 mg/dL         Site     Other         BUN, Bld       17       Calcium       9.9       Chloride       98       CO2       23       Color, UA   Sissy           Creatinine       1.1       [facility]     Room Air         Differential Method       Automated       eGFR if        54.4       eGFR if non        47.2  Comment:  Calculation used to obtain the estimated glomerular filtration  rate (eGFR) is the CKD-EPI equation.          Eos #       0.1       Eosinophil%       0.5       Glucose       94       Glucose, UA   Negative           Gran # (ANC)       8.7       Gran%       46.5       Hematocrit       48.8       Hemoglobin       15.6       Hyaline Casts, UA   1           Hypo       Occasional       Ketones, UA   1+           Lactate, Sim               Leukocytes, UA   Negative           Lymph #       4.6       Lymph%       24.6       Magnesium       1.7       MCH       27.8       MCHC       32.0       MCV       87       Microscopic  Comment   SEE COMMENT  Comment:  Other formed elements not mentioned in the report are not   present in the microscopic examination.              Mode               Mono #       5.2       Mono%       27.6       MPV       10.8       Nitrite, UA   Negative           nRBC       0       Occult Blood UA   Negative           pH, UA   7.0           Phosphorus       2.7       Platelet Estimate       Appears normal       Platelets       167       POC BE     0         POC HCO3     23.6         POC PCO2     34.1         POC PH     7.448         POC PO2     42         POC SATURATED O2     80         POC TCO2     25         POCT Glucose 121       103     Potassium       3.6       Total Protein       7.7       Protein, UA   2+  Comment:  Recommend a 24 hour urine protein or a urine   protein/creatinine ratio if globulin induced proteinuria is  clinically suspected.             RBC       5.61       RBC, UA   3           RDW       14.8       Sample     VENOUS         Sodium       137       Specific Gravity, UA   1.020           Specimen UA   Urine, Catheterized           Squam Epithel, UA   0           WBC, UA   2           WBC       18.63                        12/18/18  2201   12/18/18  2117        Immature Granulocytes         Immature Grans (Abs)         Procalcitonin   0.17  Comment:  A concentration < 0.25 ng/mL represents a low risk bacterial   infection.  Procalcitonin may not be accurate among patients with localized   infection, recent trauma or major surgery, immunosuppressed state,   invasive fungal infection, renal dysfunction. Decisions regarding   initiation or continuation of antibiotic therapy should not be based   solely on procalcitonin levels.       Albumin         Alkaline Phosphatase         Allens Test Pass       ALT         Anion Gap   14     Aniso         Appearance, UA         AST         Bacteria, UA         Baso #         Basophil%         Bilirubin (UA)         Total Bilirubin         Site RR       BUN,  Bld   14     Calcium   9.8     Chloride   100     CO2   24     Color, UA         Creatinine   1.0     Dels Room Air       Differential Method         eGFR if    >60.0     eGFR if non    53.0  Comment:  Calculation used to obtain the estimated glomerular filtration  rate (eGFR) is the CKD-EPI equation.        Eos #         Eosinophil%         Glucose   104     Glucose, UA         Gran # (ANC)         Gran%         Hematocrit         Hemoglobin         Hyaline Casts, UA         Hypo         Ketones, UA         Lactate, Sim   1.6  Comment:  Falsely low lactic acid results can be found in samples   containing >=13.0 mg/dL total bilirubin and/or >=3.5 mg/dL   direct bilirubin.       Leukocytes, UA         Lymph #         Lymph%         Magnesium         MCH         MCHC         MCV         Microscopic Comment         Mode SPONT       Mono #         Mono%         MPV         Nitrite, UA         nRBC         Occult Blood UA         pH, UA         Phosphorus         Platelet Estimate         Platelets         POC BE 2       POC HCO3 25.4       POC PCO2 32.8       POC PH 7.498       POC PO2 55       POC SATURATED O2 91       POC TCO2 26       POCT Glucose         Potassium   3.0     Total Protein         Protein, UA         RBC         RBC, UA         RDW         Sample ARTERIAL       Sodium   138     Specific Gravity, UA         Specimen UA         Squam Epithel, UA         WBC, UA         WBC               Significant Imaging: EEG ordered for today

## 2018-12-19 NOTE — CARE UPDATE
RN Proactive Rounding Note  Time of Visit: 45    Admit Date: 2018  LOS: 0  Code Status: Full Code   Date of Visit: 2018  : 1935  Age: 83 y.o.  Sex: female  Race: Black or   Bed: 345/345 A:   MRN: [id number]  Was the patient discharged from an ICU this admission? no   Was the patient discharged from a PACU within last 24 hours?  no  Did the patient receive conscious sedation/general anesthesia in last 24 hours?  no  Was the patient in the ED within the past 24 hours?  yes  Was the patient started on NIPPV within the past 24 hours?  no  Attending Physician: Theresa Prieto MD  Primary Service: [facility]      ASSESSMENT:     Abnormal Vital Signs:  Clinical Issues: Neuro, Circulatory     INTERVENTIONS/ RECOMMENDATIONS:     Rapid response following patient throughout shift.  Patient Upgraded to Cardiac step down unit from [facility] for hypertensive crisis. Patient antihypertensives held all day due to inability to swallow at this time due to decrease mental status and family refusing NGT placement. Patient admitted for AMS secondary to encephalopathy.  Patient assessed by RRN at 5 upon arrival to floor.  Patient lethargic, arousing to sternal rub, falling back asleep immediately. VSS: 97.7 temp, 120 hr, RR 18, 94% on room air.  /80.  Primary team called to bedside to see patient upon arrival.  Physician at bedside @ 2200.  Ordered STAT head CT, Lactic and procal levels and STAT ABG.   Patient afebrile upon arrival but shortly after temperature assessed @ 2320 to be 118.  Reassessed at 0009 and found to be 102.6.  Primary physician ordered critical care consult, antibiotics, blood cultures, and acetaminophen suppository due to family still refusing NGT placement.  Critical care service at bedside and recommend admitting to ICU at this time.  RRN to assist in transporting patient to room 6074 when room available.  Patients Daughter at bedside and in agreement with POC.   Charge nurse on 6 notified of patients pending arrival.      Discussed plan of care with RN,  Analilia RN, Anatoly RN    PHYSICIAN ESCALATION:     Yes/No  yes    Orders received and case discussed with Dr. Sunny GARSIA.    Disposition: Tx in ICU bed 6074.    FOLLOW-UP/CONTINGENCY:     Call back the Rapid Response Nurse at x [phone] for additional questions or concerns.

## 2018-12-19 NOTE — PLAN OF CARE
"Problem: Adult Inpatient Plan of Care  Goal: Plan of Care Review  No acute events throughout shift. See vital signs and assessments for documentation. See below for updates.    Pulmonary:  Patient on room air, lung sounds clear in all fields.     Cardiac: Irregular rhythm, heart rate in the teens, systolic goal <180.    Neurological: Mentation improvement throughout day, patient able to follow commands after being repeated to her.  Patient witnessed to purposefully open eyes to voice, give a "thumbs up" with right thumb, and flex bilateral feet.    Gastrointestinal: +BS in all quadrants.  No BM this shift.     Genitourinary: Bladder scan performed, 226mLs visualized, no urine output to purewick.    Endocrine: NPO S/T mental status.     Integumentary/other: WDL    Gtts:  N/A    POC: Continue monitoring, lab draws, and antibiotics as ordered.  Monitor temp. And spot check blood glucose checks.       Patient Dr. Lux and family updated on POC. Questions and concerns addressed. WCTM        "

## 2018-12-19 NOTE — PT/OT/SLP PROGRESS
Physical Therapy  Orders Discontinued    Patient Name:  Selma Alonzo Lux MD   MRN:  [id number]  Admitting Diagnosis: Acute encephalopathy  Recent Surgery: * No surgery found *      Plan:     Physical Therapy orders received and acknowledged. Patient transferred to ICU prior to eval completion. Please reorder therapy when appropriate for progressive mobility.    Radha Gillis PT, DPT  12/19/2018   Pager: [phone]

## 2018-12-19 NOTE — PROGRESS NOTES
12/19/18 0009   Vital Signs   Temp (!) 102.6 °F (39.2 °C)   Temp src Oral   Pulse 92   Resp 18   SpO2 (!) 94 %   O2 Device (Oxygen Therapy) room air   BP (!) 151/89   MAP (mmHg) 110   Patient Position Lying     Rechecked pt's vital sign. MD Correa notified.

## 2018-12-20 PROBLEM — N18.30 ACUTE RENAL FAILURE SUPERIMPOSED ON STAGE 3 CHRONIC KIDNEY DISEASE: Status: ACTIVE | Noted: 2018-04-15

## 2018-12-20 LAB
ALBUMIN SERPL BCP-MCNC: 3.4 G/DL
ALP SERPL-CCNC: 45 U/L
ALT SERPL W/O P-5'-P-CCNC: 8 U/L
AMORPH CRY UR QL COMP ASSIST: ABNORMAL
ANION GAP SERPL CALC-SCNC: 14 MMOL/L
ANISOCYTOSIS BLD QL SMEAR: SLIGHT
AST SERPL-CCNC: 12 U/L
BACTERIA #/AREA URNS AUTO: ABNORMAL /HPF
BASOPHILS # BLD AUTO: 0.03 K/UL
BASOPHILS NFR BLD: 0.2 %
BILIRUB SERPL-MCNC: 1.3 MG/DL
BILIRUB UR QL STRIP: NEGATIVE
BUN SERPL-MCNC: 30 MG/DL
BURR CELLS BLD QL SMEAR: ABNORMAL
CALCIUM SERPL-MCNC: 8.9 MG/DL
CHLORIDE SERPL-SCNC: 102 MMOL/L
CLARITY UR REFRACT.AUTO: ABNORMAL
CO2 SERPL-SCNC: 22 MMOL/L
COLOR UR AUTO: ABNORMAL
CREAT SERPL-MCNC: 1.6 MG/DL
CREAT UR-MCNC: 174 MG/DL
DIFFERENTIAL METHOD: ABNORMAL
EOSINOPHIL # BLD AUTO: 0 K/UL
EOSINOPHIL NFR BLD: 0.1 %
ERYTHROCYTE [DISTWIDTH] IN BLOOD BY AUTOMATED COUNT: 14.8 %
EST. GFR  (AFRICAN AMERICAN): 34.6 ML/MIN/1.73 M^2
EST. GFR  (NON AFRICAN AMERICAN): 30 ML/MIN/1.73 M^2
GLUCOSE SERPL-MCNC: 121 MG/DL
GLUCOSE UR QL STRIP: NEGATIVE
HCT VFR BLD AUTO: 46.7 %
HGB BLD-MCNC: 14.9 G/DL
HGB UR QL STRIP: NEGATIVE
HYALINE CASTS UR QL AUTO: 0 /LPF
HYPOCHROMIA BLD QL SMEAR: ABNORMAL
IMM GRANULOCYTES # BLD AUTO: 0.06 K/UL
IMM GRANULOCYTES NFR BLD AUTO: 0.4 %
KETONES UR QL STRIP: ABNORMAL
LEUKOCYTE ESTERASE UR QL STRIP: ABNORMAL
LYMPHOCYTES # BLD AUTO: 1.6 K/UL
LYMPHOCYTES NFR BLD: 9.8 %
MAGNESIUM SERPL-MCNC: 1.7 MG/DL
MCH RBC QN AUTO: 27.1 PG
MCHC RBC AUTO-ENTMCNC: 31.9 G/DL
MCV RBC AUTO: 85 FL
MICROSCOPIC COMMENT: ABNORMAL
MONOCYTES # BLD AUTO: 4.2 K/UL
MONOCYTES NFR BLD: 26.6 %
NEUTROPHILS # BLD AUTO: 10 K/UL
NEUTROPHILS NFR BLD: 62.9 %
NITRITE UR QL STRIP: NEGATIVE
NRBC BLD-RTO: 0 /100 WBC
OVALOCYTES BLD QL SMEAR: ABNORMAL
PH UR STRIP: 5 [PH] (ref 5–8)
PHOSPHATE SERPL-MCNC: 3.6 MG/DL
PLATELET # BLD AUTO: 132 K/UL
PLATELET BLD QL SMEAR: ABNORMAL
PMV BLD AUTO: 10.1 FL
POCT GLUCOSE: 233 MG/DL (ref 70–110)
POIKILOCYTOSIS BLD QL SMEAR: SLIGHT
POLYCHROMASIA BLD QL SMEAR: ABNORMAL
POTASSIUM SERPL-SCNC: 3.9 MMOL/L
PROT SERPL-MCNC: 7.2 G/DL
PROT UR QL STRIP: ABNORMAL
RBC # BLD AUTO: 5.5 M/UL
RBC #/AREA URNS AUTO: 1 /HPF (ref 0–4)
RPR SER QL: NORMAL
SODIUM SERPL-SCNC: 138 MMOL/L
SODIUM UR-SCNC: <20 MMOL/L
SP GR UR STRIP: 1.01 (ref 1–1.03)
SQUAMOUS #/AREA URNS AUTO: 1 /HPF
URN SPEC COLLECT METH UR: ABNORMAL
UUN UR-MCNC: 547 MG/DL
WBC # BLD AUTO: 15.88 K/UL
WBC #/AREA URNS AUTO: 32 /HPF (ref 0–5)
WBC CLUMPS UR QL AUTO: ABNORMAL

## 2018-12-20 PROCEDURE — S5010 5% DEXTROSE AND 0.45% SALINE: HCPCS | Performed by: STUDENT IN AN ORGANIZED HEALTH CARE EDUCATION/TRAINING PROGRAM

## 2018-12-20 PROCEDURE — 85025 COMPLETE CBC W/AUTO DIFF WBC: CPT

## 2018-12-20 PROCEDURE — 80053 COMPREHEN METABOLIC PANEL: CPT

## 2018-12-20 PROCEDURE — 84540 ASSAY OF URINE/UREA-N: CPT

## 2018-12-20 PROCEDURE — 25000003 PHARM REV CODE 250: Performed by: STUDENT IN AN ORGANIZED HEALTH CARE EDUCATION/TRAINING PROGRAM

## 2018-12-20 PROCEDURE — 20000000 HC ICU ROOM

## 2018-12-20 PROCEDURE — 83735 ASSAY OF MAGNESIUM: CPT

## 2018-12-20 PROCEDURE — 84100 ASSAY OF PHOSPHORUS: CPT

## 2018-12-20 PROCEDURE — 99232 SBSQ HOSP IP/OBS MODERATE 35: CPT | Mod: ,,, | Performed by: PSYCHIATRY & NEUROLOGY

## 2018-12-20 PROCEDURE — 87086 URINE CULTURE/COLONY COUNT: CPT

## 2018-12-20 PROCEDURE — 84300 ASSAY OF URINE SODIUM: CPT

## 2018-12-20 PROCEDURE — 62270 LUMBAR PUNCTURE: ICD-10-PCS | Mod: ,,, | Performed by: INTERNAL MEDICINE

## 2018-12-20 PROCEDURE — 63600175 PHARM REV CODE 636 W HCPCS

## 2018-12-20 PROCEDURE — 81001 URINALYSIS AUTO W/SCOPE: CPT

## 2018-12-20 PROCEDURE — 25000003 PHARM REV CODE 250: Performed by: HOSPITALIST

## 2018-12-20 PROCEDURE — 63600175 PHARM REV CODE 636 W HCPCS: Performed by: STUDENT IN AN ORGANIZED HEALTH CARE EDUCATION/TRAINING PROGRAM

## 2018-12-20 PROCEDURE — 63600175 PHARM REV CODE 636 W HCPCS: Performed by: INTERNAL MEDICINE

## 2018-12-20 PROCEDURE — 62270 DX LMBR SPI PNXR: CPT | Mod: ,,, | Performed by: INTERNAL MEDICINE

## 2018-12-20 PROCEDURE — 99232 PR SUBSEQUENT HOSPITAL CARE,LEVL II: ICD-10-PCS | Mod: ,,, | Performed by: PSYCHIATRY & NEUROLOGY

## 2018-12-20 PROCEDURE — 99233 PR SUBSEQUENT HOSPITAL CARE,LEVL III: ICD-10-PCS | Mod: 25,GC,, | Performed by: INTERNAL MEDICINE

## 2018-12-20 PROCEDURE — 94761 N-INVAS EAR/PLS OXIMETRY MLT: CPT

## 2018-12-20 PROCEDURE — 82570 ASSAY OF URINE CREATININE: CPT

## 2018-12-20 PROCEDURE — 99233 SBSQ HOSP IP/OBS HIGH 50: CPT | Mod: 25,GC,, | Performed by: INTERNAL MEDICINE

## 2018-12-20 RX ORDER — FENTANYL CITRATE 50 UG/ML
INJECTION, SOLUTION INTRAMUSCULAR; INTRAVENOUS
Status: COMPLETED
Start: 2018-12-20 | End: 2018-12-20

## 2018-12-20 RX ORDER — MIDAZOLAM HYDROCHLORIDE 1 MG/ML
2 INJECTION INTRAMUSCULAR; INTRAVENOUS ONCE
Status: COMPLETED | OUTPATIENT
Start: 2018-12-20 | End: 2018-12-20

## 2018-12-20 RX ORDER — FENTANYL CITRATE 50 UG/ML
50 INJECTION, SOLUTION INTRAMUSCULAR; INTRAVENOUS ONCE
Status: COMPLETED | OUTPATIENT
Start: 2018-12-20 | End: 2018-12-20

## 2018-12-20 RX ORDER — DEXTROSE MONOHYDRATE AND SODIUM CHLORIDE 5; .45 G/100ML; G/100ML
INJECTION, SOLUTION INTRAVENOUS CONTINUOUS
Status: DISCONTINUED | OUTPATIENT
Start: 2018-12-20 | End: 2018-12-22

## 2018-12-20 RX ORDER — CEFEPIME HYDROCHLORIDE 2 G/1
2 INJECTION, POWDER, FOR SOLUTION INTRAVENOUS
Status: DISCONTINUED | OUTPATIENT
Start: 2018-12-21 | End: 2018-12-26

## 2018-12-20 RX ADMIN — ACYCLOVIR SODIUM 520 MG: 50 INJECTION, SOLUTION INTRAVENOUS at 11:12

## 2018-12-20 RX ADMIN — VANCOMYCIN HYDROCHLORIDE 1000 MG: 1 INJECTION, POWDER, LYOPHILIZED, FOR SOLUTION INTRAVENOUS at 04:12

## 2018-12-20 RX ADMIN — FENTANYL CITRATE 25 MCG: 50 INJECTION INTRAMUSCULAR; INTRAVENOUS at 05:12

## 2018-12-20 RX ADMIN — DEXTROSE AND SODIUM CHLORIDE: 5; .45 INJECTION, SOLUTION INTRAVENOUS at 04:12

## 2018-12-20 RX ADMIN — FENTANYL CITRATE 25 MCG: 50 INJECTION, SOLUTION INTRAMUSCULAR; INTRAVENOUS at 05:12

## 2018-12-20 RX ADMIN — HYDRALAZINE HYDROCHLORIDE 5 MG: 20 INJECTION INTRAMUSCULAR; INTRAVENOUS at 11:12

## 2018-12-20 RX ADMIN — MIDAZOLAM HYDROCHLORIDE 1 MG: 1 INJECTION, SOLUTION INTRAMUSCULAR; INTRAVENOUS at 05:12

## 2018-12-20 RX ADMIN — ACETAMINOPHEN 650 MG: 650 SUPPOSITORY RECTAL at 08:12

## 2018-12-20 RX ADMIN — METHYLPREDNISOLONE SODIUM SUCCINATE 20 MG: 40 INJECTION, POWDER, FOR SOLUTION INTRAMUSCULAR; INTRAVENOUS at 11:12

## 2018-12-20 RX ADMIN — CEFEPIME 2 G: 2 INJECTION, POWDER, FOR SOLUTION INTRAVENOUS at 11:12

## 2018-12-20 NOTE — CONSULTS
"  [facility]  Adult Nutrition  Consult Note    SUMMARY     Recommendations    1. If able to advance diet, recommend Regular diet (texture per SLP).   2. If unable to advance diet, place NGT & initiate enteral nutrition. Recommend Isosource 1.5 @ 40 mL/hr to provide 1440 kcals, 65 g of protein, 733 mL fluid.    - Hold for residuals >500 mL; additional fluid per MD.  3. RD to monitor & follow-up.    Goals: Meet % EEN, EPN  Nutrition Goal Status: new  Communication of RD Recs: reviewed with RN    Reason for Assessment    Reason For Assessment: consult  Diagnosis: other (see comments)(Encephalopathy)  Relevant Medical History: HTN, Dementia  Interdisciplinary Rounds: attended    General Information Comments: Pt currently NPO 2/2 AMS. Pt non-verbal, history obtained from previous RD notes & chart review. Pt weighed 71.5 kg - 2018. NFPE complete, pt w/ no physical signs of malnutrition. Prior to NPO status, pt on cardiac diet w/ adequate PO intake (per RN documentation). Doesn't meet malnutrition criteria.   Nutrition Discharge Planning: Unable to determine    Nutrition/Diet History    Patient Reported Diet/Restrictions/Preferences: other (see comments)(KERRY)  Spiritual, Cultural Beliefs, Jew Practices, Values that Affect Care: no  Factors Affecting Nutritional Intake: NPO    Anthropometrics    Temp: (!) 100.4 °F (38 °C)  Height Method: Stated  Height: 5' 3" (160 cm)  Height (inches): 63 in  Weight Method: Bed Scale  Weight: 72.4 kg (159 lb 9.8 oz)  Weight (lb): 159.61 lb  Ideal Body Weight (IBW), Female: 115 lb  % Ideal Body Weight, Female (lb): 138.79 lb  BMI (Calculated): 28.3  BMI Grade: 25 - 29.9 - overweight  Usual Body Weight (UBW), k.5 kg (2018)  % Usual Body Weight: 101.47  % Weight Change From Usual Weight: 1.26 %    Lab/Procedures/Meds    Pertinent Labs Reviewed: reviewed  Pertinent Labs Comments: BUN 30, Creat 1.6, GFR 30, Gluc 121, A1C 5.5  Pertinent Medications " Reviewed: reviewed    Estimated/Assessed Needs    Weight Used For Calorie Calculations: 72.4 kg (159 lb 9.8 oz)     Energy Calorie Requirements (kcal): 1448 kcal/d  Energy Need Method: [place]-St Jeor(1.25 PAL)     Protein Requirements: 72-87 g/d (1-1.2 g/kg)  Weight Used For Protein Calculations: 72.4 kg (159 lb 9.8 oz)     Estimated Fluid Requirement Method: other (see comments)(Per MD or 1 mL/kcal)     CHO Requirement: 50% total kcals    Nutrition Prescription Ordered    Current Diet Order: NPO    Evaluation of Received Nutrient/Fluid Intake    Comments: LBM: 12/17    Nutrition Risk    Level of Risk/Frequency of Follow-up: (2x/week)     Assessment and Plan    Nutrition Problem  Inadequate energy intake    Related to (etiology):   Inability to consume sufficient energy    Signs and Symptoms (as evidenced by):   NPO with no alternate means of nutrition     Nutrition Diagnosis Status:   New     Monitor and Evaluation    Food and Nutrient Intake: energy intake, food and beverage intake, enteral nutrition intake  Food and Nutrient Adminstration: diet order, enteral and parenteral nutrition administration  Physical Activity and Function: nutrition-related ADLs and IADLs  Anthropometric Measurements: weight, weight change  Biochemical Data, Medical Tests and Procedures: lipid profile, inflammatory profile, glucose/endocrine profile, gastrointestinal profile, electrolyte and renal panel  Nutrition-Focused Physical Findings: overall appearance     Nutrition Follow-Up    RD Follow-up?: Yes

## 2018-12-20 NOTE — PROGRESS NOTES
[facility]  Critical Care Medicine  Progress Note    Patient Name: Selma Alonzo Lux MD  MRN: [id number]  Admission Date: 12/16/2018  Hospital Length of Stay: 1 days  Code Status: Full Code  Attending Provider: Rgoelio Mijares MD  Primary Care Provider: Bhargav Hirsch MD   Principal Problem: Acute encephalopathy    Subjective:     HPI:  Ms. Hahn Alonzo Lux MD is a 83 y.o. female with rheumatoid arthritis on steroids, Plaquenil and Sulfasalazine, history of stroke on Aspirin/Plavix, and vascular dementia who presents to the emergency department 12/16/18 with her daughter because of confusion.  History is obtained from the daughter, as the patient is not able to provide a good history.  The daughter mentions that at baseline, the patient is very independent and is very sharp.  However, the day prior to admission the patient was endorsing some blurred vision, having difficulty eating saying that she could not see her plate, and was having unusual behavior-like sitting on her bedroom floor, claiming that someone moved her bed causing her to fall.  She has been having tangential speech requiring frequent redirection.  Additionally, she was complaining of some right-sided leg pain, which is unusual, as she normally complaints of left-sided leg pain.  The patient denies any numbness or tingling in any of her extremities. Of note, the patient ran out of her prednisone 2 days prior to admission.  She has been taking 25 mg daily.     The in the emergency department, she was found to have a blood pressure of 220 on arrival.  Daughter reports that her baseline blood pressure runs low, 110-130. Reports that pt is complaint with her medications.   She received 2 doses of Labetalol 20mg IV x 2 with improvement in her blood pressure to the 180s.  Head CT and MRI in the ED were negative for any acute process.             Hospital/ICU Course:  12/19/2018 ICU was consulted for altered mental status  and obtundation. Pt's daughter said that since pt admitted to the hospital, she was confused but able to talk. Started in the last 24 hs, pt became more lethargic and obtunded. Pt was evaluated by neurology during this admission and they think her clinical picture is due to hypertensive emergency and usually clinical improvement lag after BP improvement. Also, pt's daughter that pt had some cough when she was drinking juice.  Pt was brought to the ICU for airway watch.  Started empirically with acyclovir, vancomycin and cefepime, pt mental status wax and wean and her blood pressure been labile. All septic work up been negative, 12/20 plan to do LP. cret worsened to 1.6, urine lytes sent.    Interval History/Significant Events: overnight pt woke up screaming and went back to deep sleeping, she had urinary retention and FC was inserted drained 550cc    Review of Systems   Unable to perform ROS: Mental status change     Objective:     Vital Signs (Most Recent):  Temp: (!) 100.4 °F (38 °C) (12/20/18 0828)  Pulse: 104 (12/20/18 0800)  Resp: (!) 23 (12/20/18 0800)  BP: 119/60 (12/20/18 0800)  SpO2: (!) 94 % (12/20/18 0800) Vital Signs (24h Range):  Temp:  [98.8 °F (37.1 °C)-100.4 °F (38 °C)] 100.4 °F (38 °C)  Pulse:  [] 104  Resp:  [21-42] 23  SpO2:  [93 %-98 %] 94 %  BP: (110-179)/(55-92) 119/60   Weight: 72.4 kg (159 lb 9.8 oz)  Body mass index is 28.27 kg/m².      Intake/Output Summary (Last 24 hours) at 12/20/2018 0923  Last data filed at 12/20/2018 0600  Gross per 24 hour   Intake 1093.33 ml   Output 1005 ml   Net 88.33 ml       Physical Exam   Constitutional: She appears well-developed and well-nourished.   Eyes: Pupils are equal, round, and reactive to light.   Neck: Normal range of motion. Neck supple.   Cardiovascular: Normal heart sounds.   Tachycardic with PVc's   Pulmonary/Chest: Effort normal and breath sounds normal.   Abdominal: Bowel sounds are normal.   Musculoskeletal: She exhibits no edema.    Neurological:   responds to painful stimuli    Skin: Skin is warm and dry.       Vents:     Lines/Drains/Airways     Drain                 Urethral Catheter 12/19/18 1955 Straight-tip less than 1 day          Peripheral Intravenous Line                 Peripheral IV - Single Lumen 12/16/18 1342 Right Forearm 3 days         Peripheral IV - Single Lumen 12/19/18 0300 Left Antecubital 1 day              Significant Labs:    CBC/Anemia Profile:  Recent Labs   Lab 12/19/18  0300 12/19/18  0950 12/20/18  0310   WBC 18.63*  --  15.88*   HGB 15.6  --  14.9   HCT 48.8*  --  46.7     --  132*   MCV 87  --  85   RDW 14.8*  --  14.8*   FOLATE  --  39.5*  --    [id number]  --  632  --         Chemistries:  Recent Labs   Lab 12/18/18  2117 12/19/18  0300 12/20/18  0310    137 138   K 3.0* 3.6 3.9    98 102   CO2 24 23 22*   BUN 14 17 30*   CREATININE 1.0 1.1 1.6*   CALCIUM 9.8 9.9 8.9   ALBUMIN  --  3.9 3.4*   PROT  --  7.7 7.2   BILITOT  --  1.5* 1.3*   ALKPHOS  --  54* 45*   ALT  --  9* 8*   AST  --  13 12   MG  --  1.7 1.7   PHOS  --  2.7 3.6       A1C: No results for input(s): HGBA1C in the last 48 hours.  ABGs:   Recent Labs   Lab 12/19/18 0317   PH 7.448   PCO2 34.1*   HCO3 23.6*   POCSATURATED 80*   BE 0     Bilirubin:   Recent Labs   Lab 12/16/18  1350 12/17/18  0446 12/18/18  0543 12/19/18  0300 12/20/18  0310   BILIDIR  --   --   --  0.5*  --    BILITOT 0.8 0.5 0.7 1.5* 1.3*     Blood Culture:   Recent Labs   Lab 12/19/18  0300 12/19/18  0320   LABBLOO No Growth to date  No Growth to date No Growth to date  No Growth to date     BMP:   Recent Labs   Lab 12/20/18  0310   *      K 3.9      CO2 22*   BUN 30*   CREATININE 1.6*   CALCIUM 8.9   MG 1.7     CMP:   Recent Labs   Lab 12/18/18 2117 12/19/18 0300 12/20/18 0310    137 138   K 3.0* 3.6 3.9    98 102   CO2 24 23 22*    94 121*   BUN 14 17 30*   CREATININE 1.0 1.1 1.6*   CALCIUM 9.8 9.9 8.9   PROT  --   7.7 7.2   ALBUMIN  --  3.9 3.4*   BILITOT  --  1.5* 1.3*   ALKPHOS  --  54* 45*   AST  --  13 12   ALT  --  9* 8*   ANIONGAP 14 16 14   EGFRNONAA 53.0* 47.2* 30.0*     Cardiac Markers: No results for input(s): CKMB, TROPONINT, MYOGLOBIN in the last 48 hours.  Coagulation: No results for input(s): PT, INR, APTT in the last 48 hours.  Lactic Acid:   Recent Labs   Lab 12/18/18  2117   LACTATE 1.6     Lipid Panel: No results for input(s): CHOL, HDL, LDLCALC, TRIG, CHOLHDL in the last 48 hours.  Pathology Results  (Last 10 years)    None        POCT Glucose:   Recent Labs   Lab 12/19/18  0046 12/19/18  1102   POCTGLUCOSE 103 121*     Prealbumin: No results for input(s): PREALBUMIN in the last 48 hours.  Respiratory Culture: No results for input(s): GSRESP, RESPIRATORYC in the last 48 hours.  Troponin: No results for input(s): TROPONINI in the last 48 hours.  Urine Culture: No results for input(s): LABURIN in the last 48 hours.  Urine Studies:   Recent Labs   Lab 12/19/18  0500   COLORU Sissy   APPEARANCEUA Clear   PHUR 7.0   SPECGRAV 1.020   PROTEINUA 2+*   GLUCUA Negative   KETONESU 1+*   BILIRUBINUA Negative   OCCULTUA Negative   NITRITE Negative   LEUKOCYTESUR Negative   RBCUA 3   WBCUA 2   BACTERIA Rare   SQUAMEPITHEL 0   HYALINECASTS 1     All pertinent labs within the past 24 hours have been reviewed.    Significant Imaging:  I have reviewed and interpreted all pertinent imaging results/findings within the past 24 hours.      ABG  Recent Labs   Lab 12/19/18  0317   PH 7.448   PO2 42   PCO2 34.1*   HCO3 23.6*   BE 0     Assessment/Plan:     Neuro   * Acute encephalopathy    - unclear etiology at this time.   - likely ddx: hypertensive encephalopathy vs infection vs medication (on Baclofen and Gabapentin at home)  - less likely CNS vascular causes given negative CT head and MRI brain   - BP control, pt unable to take orally. On IV hydralazine prn for  SBP>180   - could be due to sepsis given pt with high tem 102 and  elevated WBC.  But LA and procal. Infectious work up send and started on vanc and Zosyn   - blood glucose is 103  - EEG 12/19 showed toxic medical encephalopathy without epileptic waves.  - PCO2 34.1  - gabapentin was held on admission and and now will hold Baclofen   - will consider LP today (12/20).     History of stroke    · Chronic and stable  · Hold Aspirin 81mg PO daily  · Hold Plavix 75mg PO daily >> will hold due to pt is not able to take PO and for possible LP.           Dystonia    · Follows with Dr. Giang of Neurology  · Hold Gabapentin and Baclofen  for now given AMS     Hypertensive encephalopathy    -- see acute encephalopathy.     Altered mental status    -- see acute encephalopathy.     Vascular dementia    · Chronic issue  · Very sharp and independent at baseline per family           Psychiatric   Delirium superimposed on dementia    -- see acute encephalopathy.     Cardiac/Vascular   Hypertension, essential    · Reports confusion and blurred vision x 2 days, with  on arrival - reports baseline BP to be 110-130s  · S/p Labetalol 20mg IV x 2 doses with improvement in BP to 180s  · Head CT and MRI negative for acute process  · Takes Coreg 12.5mg PO BID at home. Increase to 25 ng BID this admission   · Since pt is not safe to take orally at this time, Continue hydralazine 5 mg IV prn for target SBP <180       Renal/   Acute renal failure superimposed on stage 3 chronic kidney disease    -- baseline Cret 1.1.  12/20 FC was inserted due to retention, Cret increased to 1.6.  -- Urine lytes ordered.  -- strict in and out.  -- avoid nephrotoxic medication.     Hematology   Thrombocytopenia    · Plt chronically low  · Monitor for bleeding     Orthopedic   Seropositive rheumatoid arthritis of multiple sites    Dx 2012 with Dr No, rafal Qureshi  HCQ since 2012  Humira one dose April 2018 followed by ICU admission for pnemonia and resp failure     Other   Long-term use of immunosuppressant  medication    Long Term Use of Systolic Steroids  · Follows with Dr. Rouse of Rheumatology  · On Prednisone 25mg PO daily >> switched to methylpred IV 20 mg daily.  · Hold Sulfasalazine 1g PO BID  · Hold Plaquenil 400mg PO daily  · Hold Bactrim for ppx            Critical Care Daily Checklist:    A: Awake: RASS Goal/Actual Goal:    Actual: [place] Agitation Sedation Scale (RASS): Moderate sedation   B: Spontaneous Breathing Trial Performed?     C: SAT & SBT Coordinated?  NA                      D: Delirium: CAM-ICU Overall CAM-ICU: Positive   E: Early Mobility Performed? No   F: Feeding Goal: Goals: Meet % EEN, EPN  Status: Nutrition Goal Status: new   Current Diet Order   Procedures    Diet NPO      AS: Analgesia/Sedation NA   T: Thromboembolic Prophylaxis Not currently, for possible LP   H: HOB > 300 Yes   U: Stress Ulcer Prophylaxis (if needed) NA   G: Glucose Control YES   B: Bowel Function     I: Indwelling Catheter (Lines & Hill) Necessity YES   D: De-escalation of Antimicrobials/Pharmacotherapies YES    Plan for the day/ETD NA    Code Status:  Family/Goals of Care: Full Code         Critical secondary to Patient has a condition that poses threat to life and bodily function: AMS.      Critical care was time spent personally by me on the following activities: development of treatment plan with patient or surrogate and bedside caregivers, discussions with consultants, evaluation of patient's response to treatment, examination of patient, ordering and performing treatments and interventions, ordering and review of laboratory studies, ordering and review of radiographic studies, pulse oximetry, re-evaluation of patient's condition. This critical care time did not overlap with that of any other provider or involve time for any procedures.     Molina Mnote MD  Critical Care Medicine  [facility]

## 2018-12-20 NOTE — ASSESSMENT & PLAN NOTE
- unclear etiology at this time.   - likely ddx: hypertensive encephalopathy vs infection vs medication (on Baclofen and Gabapentin at home)  - less likely CNS vascular causes given negative CT head and MRI brain   - BP control, pt unable to take orally. On IV hydralazine prn for  SBP>180   - could be due to sepsis given pt with high tem 102 and elevated WBC.  But LA and procal. Infectious work up send and started on vanc and Zosyn   - blood glucose is 103  - EEG 12/19 showed toxic medical encephalopathy without epileptic waves.  - PCO2 34.1  - gabapentin was held on admission and and now will hold Baclofen   - will consider LP today (12/20).

## 2018-12-20 NOTE — HOSPITAL COURSE
-12/19/2018 ICU was consulted for altered mental status and obtundation. Pt's daughter said that since pt admitted to the hospital, she was confused but able to talk. Started in the last 24 hs, pt became more lethargic and obtunded. Pt was evaluated by neurology during this admission and they think her clinical picture is due to hypertensive emergency and usually clinical improvement lag after BP improvement. Also, pt's daughter that pt had some cough when she was drinking juice.  Pt was brought to the ICU for airway watch.  Started empirically with acyclovir, vancomycin and cefepime, pt mental status wax and wean and her blood pressure been labile. All septic work up been negative  -12/20 plan to do LP. cret worsened to 1.6, urine lytes sent.  -12/21 - Called IR to obtain LP.Anesthesia has already tried, and IR will now proceed with procedure. SLP/PT/OT ordered.   -12/22- IR could not get LP yesterday. Patient getting 24 EEG done today. MRI was reexamined with radiology which was not concerning for PRES or temporal enhancement.    -12/23: Spoke to EEG, although official read is not available patient is not in status epilepticus and there is no seizure like activity. Slowing due to metabolic issues.    - 12/24: no issues overnight. Was able to open her eye and speaks couple words but confused. Midline today +/- IR LP  - 12/25: LP done (12/24) CSF negative for bacterial infection, couple viral CSF PCR still in process. Had one episode of hypotension to SBP 80s responded well to IVF   12/26: No acute events overnight. NG tube inadvertently removed by patient. This morning, she is awake and talkative. She is AOx3.

## 2018-12-20 NOTE — ASSESSMENT & PLAN NOTE
"Initial presentation of blurry vision, consistent w/ HTN encephalopathy. Patient waxed and waned from baseline to more confusing during her stay in the observation unit. Was more somnolent on 12/18 and oral medication was stopped 2/2 aspiration risk and some difficulty w/ BP control w/ IV meds due to availability of medication as well as observation status. Had worsening mentation throughout the day and developed a fever ( Tmax 102.6), tachycardia, increased RR and persistently elevated BP.  She was transferred to MICU for increased level of care overnight on 12/19.  Less responsive today w/ decreased movement on the L side. Per family, episodic agitation overnight, report that she appears "scared". Somnolent for most of the day.    - Recommend repeating MRI at this time  - Agree with LP  - Would recommend continuous EEG placement after the above studies completed for evaluation of episodic witnessed LUE/LLE rhythmic jerking to r/o seizure   - EEG w/ previous R hemispheric slowing   - Antibiotics broadened as well as Acyclovir added for viral coverage. Patient w/ elevated WBC suggestive an infectious etiology, although no source at this time. Patient is immunocompromised 2/2 RA medication ( steroids, DMARD)   - CXR w/ persistent patchy consolidation, although appears more chronic in nature.   - EEG pending and ordered to assess for cortical dysfunction   - Pending EEG results, primary team considering LP   - B1 45, low end of normal, can consider supplementation, although not the etiology of patient's current condition   - Will continue to follow patient.   "

## 2018-12-20 NOTE — PROGRESS NOTES
[facility]  Neurology  Progress Note    Patient Name: Selma Rosado MD Jose J  MRN: [id number]  Admission Date: 12/16/2018  Hospital Length of Stay: 1 days  Code Status: Full Code   Attending Provider: Rogelio Mijares MD  Primary Care Physician: Bhargav Hirsch MD   Principal Problem:Acute encephalopathy      Subjective:     Interval History: Patient does not follow commands this morning. R>L movement and some hyperreflexia noted on the L. Patient w/o obvious gaze deviation, crosses midline.   Per family, was restless at time overnight and did show episodic signs of agitation overnight.     Current Neurological Medications:     Current Facility-Administered Medications   Medication Dose Route Frequency Provider Last Rate Last Dose    acetaminophen suppository 650 mg  650 mg Rectal Q6H PRN Kristen Correa MD   650 mg at 12/20/18 0828    acyclovir (ZOVIRAX) 520 mg in dextrose 5 % 100 mL IVPB  10 mg/kg (Ideal) Intravenous Q12H Dayan Martinez  mL/hr at 12/20/18 1130 520 mg at 12/20/18 1130    [START ON 12/21/2018] ceFEPIme injection 2 g  2 g Intravenous Q24H Dayan Martinez MD        dextrose 5 % and 0.45 % NaCl infusion   Intravenous Continuous Dayan Martinez  mL/hr at 12/20/18 1602      dextrose 50 % in water (D50W) injection 25 g  25 g Intravenous PRN Karen Toledo MD        dextrose 50% injection 12.5 g  12.5 g Intravenous PRN Karen Toledo MD        dextrose 50% injection 12.5 g  12.5 g Intravenous PRN MERARY Anguiano MD        fentaNYL (SUBLIMAZE) 50 mcg/mL injection             fentaNYL injection 50 mcg  50 mcg Intravenous Once Rogelio Mijares MD        glucagon (human recombinant) injection 1 mg  1 mg Intramuscular PRN Karen Toledo MD        hydrALAZINE injection 5 mg  5 mg Intravenous Q4H PRN Rogelio Mijares MD        methylPREDNISolone sodium succinate injection 20 mg  20 mg Intravenous  Daily Rogelio Mijares MD   20 mg at 12/20/18 1125    midazolam (VERSED) 1 mg/mL injection 2 mg  2 mg Intravenous Once Rogelio Mijares MD        ondansetron injection 4 mg  4 mg Intravenous Q6H PRN Shelli Bernstein NP   4 mg at 12/18/18 1845    sodium chloride 0.9% flush 5 mL  5 mL Intravenous PRN Karen Toledo MD        vancomycin in dextrose 5 % 1 gram/250 mL IVPB 1,000 mg  15 mg/kg Intravenous Daily Dayna Martinez MD   1,000 mg at 12/20/18 0413       Review of Systems   Unable to perform ROS: Acuity of condition     Objective:     Vital Signs (Most Recent):  Temp: (!) 100.4 °F (38 °C) (12/20/18 0828)  Pulse: 106 (12/20/18 1700)  Resp: (!) 27 (12/20/18 1700)  BP: (!) 124/58 (12/20/18 1700)  SpO2: 95 % (12/20/18 1700) Vital Signs (24h Range):  Temp:  [99 °F (37.2 °C)-100.4 °F (38 °C)] 100.4 °F (38 °C)  Pulse:  [100-112] 106  Resp:  [16-42] 27  SpO2:  [93 %-98 %] 95 %  BP: (104-179)/(55-92) 124/58     Weight: 72.4 kg (159 lb 9.8 oz)  Body mass index is 28.27 kg/m².    Physical Exam    General:  Well-developed, well-nourished,  doesn't follow commands. Opens eyes spontaneoulsy   HEENT:  NCAT, PERRLA, oropharyngeal membranes non-erythematous/without exudate  Neck:  Supple, normal ROM without nuchal rigidity  Resp:  Symmetric expansion, no increased wob  CVS:  No LE edema, peripheral pulses 2+ (radial, dorsalis pedis)  GI:  Abd soft, non-distended, non-tender to palpation  Neurologic Exam:  Mental Status:  Lethargic, unable to access   Cranial Nerves:  PERRLA, R gaze preference but able to overcome w/ occulo-cephalic maneuvers and also at times she crosses midline on her own. Difficult to assess 2/2 patient's condition .  Motor:  Normal bulk and tone.  Moves all extremities w/ noxious stimuli although R>L  Sensory: Noxious stimuli only, unable to assess light touch   Reflexes: R Biceps, brachioradialis, patellar, Achilles 1+.    L Biceps, brachioradialis 3+, patellar, Achilles  2+  Coordination: Noted periodic LUE rhythmic jerking during the exam that subsided.   Gait: Deferred           Significant Labs:   Hemoglobin A1c: No results for input(s): HGBA1C in the last 720 hours.  Blood Culture:   Recent Labs   Lab 12/19/18 0300 12/19/18  0320   LABBLOO No Growth to date  No Growth to date No Growth to date  No Growth to date     BMP:   Recent Labs   Lab 12/18/18 2117 12/19/18 0300 12/20/18  0310    94 121*    137 138   K 3.0* 3.6 3.9    98 102   CO2 24 23 22*   BUN 14 17 30*   CREATININE 1.0 1.1 1.6*   CALCIUM 9.8 9.9 8.9   MG  --  1.7 1.7     CBC:   Recent Labs   Lab 12/19/18 0300 12/20/18  0310   WBC 18.63* 15.88*   HGB 15.6 14.9   HCT 48.8* 46.7    132*     CMP:   Recent Labs   Lab 12/18/18 2117 12/19/18 0300 12/20/18  0310    94 121*    137 138   K 3.0* 3.6 3.9    98 102   CO2 24 23 22*   BUN 14 17 30*   CREATININE 1.0 1.1 1.6*   CALCIUM 9.8 9.9 8.9   MG  --  1.7 1.7   PROT  --  7.7 7.2   ALBUMIN  --  3.9 3.4*   BILITOT  --  1.5* 1.3*   ALKPHOS  --  54* 45*   AST  --  13 12   ALT  --  9* 8*   ANIONGAP 14 16 14   EGFRNONAA 53.0* 47.2* 30.0*     CSF Culture: No results for input(s): CSFCULTURE in the last 48 hours.  CSF Studies: No results for input(s): ALIQUT, APPEARCSF, COLORCSF, CSFWBC, CSFRBC, GLUCCSF, LDHCSF, PROTEINCSF, VDRLCSF in the last 48 hours.  Inflammatory Markers:   Recent Labs   Lab 12/18/18 2117   PROCAL 0.17     POCT Glucose:   No results for input(s): POCTGLUCOSE in the last 24 hours.  Prealbumin: No results for input(s): PREALBUMIN in the last 48 hours.  Respiratory Culture: No results for input(s): GSRESP, RESPIRATORYC in the last 48 hours.  Urine Culture: No results for input(s): LABURIN in the last 48 hours.  Urine Studies:   Recent Labs   Lab 12/20/18  1132   COLORU Sissy   APPEARANCEUA Cloudy*   PHUR 5.0   SPECGRAV 1.015   PROTEINUA 2+*   GLUCUA Negative   KETONESU Trace*   BILIRUBINUA Negative   OCCULTUA  "Negative   NITRITE Negative   LEUKOCYTESUR 2+*   RBCUA 1   WBCUA 32*   BACTERIA Occasional   SQUAMEPITHEL 1   HYALINECASTS 0     All pertinent lab results from the past 24 hours have been reviewed.    Significant Imaging: I have reviewed all pertinent imaging results/findings within the past 24 hours.   EEG  There is evidence for R hemispheric cortical dysfunction consistent with a structural lesion and moderate generalized encephalopathy possibly due to toxic/metabolic etiology or medication effect. No seizures were recorded during this study.    Assessment and Plan:     * Acute encephalopathy    Initial presentation of blurry vision, consistent w/ HTN encephalopathy. Patient waxed and waned from baseline to more confusing during her stay in the observation unit. Was more somnolent on 12/18 and oral medication was stopped 2/2 aspiration risk and some difficulty w/ BP control w/ IV meds due to availability of medication as well as observation status. Had worsening mentation throughout the day and developed a fever ( Tmax 102.6), tachycardia, increased RR and persistently elevated BP.  She was transferred to MICU for increased level of care overnight on 12/19.  Less responsive today w/ decreased movement on the L side. Per family, episodic agitation overnight, report that she appears "scared". Somnolent for most of the day.    - Recommend repeating MRI at this time  - Agree with LP  - Would recommend continuous EEG placement after the above studies completed for evaluation of episodic witnessed LUE/LLE rhythmic jerking to r/o seizure   - EEG w/ previous R hemispheric slowing   - Antibiotics broadened as well as Acyclovir added for viral coverage. Patient w/ elevated WBC suggestive an infectious etiology, although no source at this time. Patient is immunocompromised 2/2 RA medication ( steroids, DMARD)   - CXR w/ persistent patchy consolidation, although appears more chronic in nature.   - EEG pending and ordered to " assess for cortical dysfunction   - Pending EEG results, primary team considering LP   - B1 45, low end of normal, can consider supplementation, although not the etiology of patient's current condition   - Will continue to follow patient.      History of stroke    - CTH w/o acute intracranial changes on repeat imaging on 12/18     Vascular dementia    - At baseline     Hypertension, essential    - Management per primary team          VTE Risk Mitigation (From admission, onward)        Ordered     IP VTE HIGH RISK PATIENT  Once      12/16/18 1933     Place DIMPLE hose  Until discontinued      12/16/18 1821          Nereida English MD  Neurology  [facility]

## 2018-12-20 NOTE — ASSESSMENT & PLAN NOTE
· Chronic and stable  · Hold Aspirin 81mg PO daily  · Hold Plavix 75mg PO daily >> will hold due to pt is not able to take PO and for possible LP.

## 2018-12-20 NOTE — PLAN OF CARE
Problem: Adult Inpatient Plan of Care  Goal: Plan of Care Review  Outcome: Ongoing (interventions implemented as appropriate)  See vital signs and assessments in flowsheets. See below for updates on today's progress.     Pulmonary: Lung sounds coarse. 93-98% RA.    Cardiovascular: Sinus tach. BP labile.    Neurological: Waxing and waning level of responsiveness. Pt responding intermittently to commands, will open eyes but does not track. Afebrile.     Gastrointestinal: No BM.    Genitourinary: Hill placed for urinary retention.    Integumentary/Other: Skin intact.    Infusions: IV abx given.    Patient progressing towards goals as tolerated, plan of care communicated and reviewed with Selma Lux MD and family. All concerns addressed, questions answered. Will continue to monitor closely.

## 2018-12-20 NOTE — ASSESSMENT & PLAN NOTE
Long Term Use of Systolic Steroids  · Follows with Dr. Rouse of Rheumatology  · On Prednisone 25mg PO daily >> switched to methylpred IV 20 mg daily.  · Hold Sulfasalazine 1g PO BID  · Hold Plaquenil 400mg PO daily  · Hold Bactrim for ppx

## 2018-12-20 NOTE — ASSESSMENT & PLAN NOTE
· Reports confusion and blurred vision x 2 days, with  on arrival - reports baseline BP to be 110-130s  · S/p Labetalol 20mg IV x 2 doses with improvement in BP to 180s  · Head CT and MRI negative for acute process  · Takes Coreg 12.5mg PO BID at home. Increase to 25 ng BID this admission   · Since pt is not safe to take orally at this time, Continue hydralazine 5 mg IV prn for target SBP <180

## 2018-12-20 NOTE — SUBJECTIVE & OBJECTIVE
Interval History/Significant Events: overnight pt woke up screaming and went back to deep sleeping, she had urinary retention and FC was inserted drained 550cc    Review of Systems   Unable to perform ROS: Mental status change     Objective:     Vital Signs (Most Recent):  Temp: (!) 100.4 °F (38 °C) (12/20/18 0828)  Pulse: 104 (12/20/18 0800)  Resp: (!) 23 (12/20/18 0800)  BP: 119/60 (12/20/18 0800)  SpO2: (!) 94 % (12/20/18 0800) Vital Signs (24h Range):  Temp:  [98.8 °F (37.1 °C)-100.4 °F (38 °C)] 100.4 °F (38 °C)  Pulse:  [] 104  Resp:  [21-42] 23  SpO2:  [93 %-98 %] 94 %  BP: (110-179)/(55-92) 119/60   Weight: 72.4 kg (159 lb 9.8 oz)  Body mass index is 28.27 kg/m².      Intake/Output Summary (Last 24 hours) at 12/20/2018 0923  Last data filed at 12/20/2018 0600  Gross per 24 hour   Intake 1093.33 ml   Output 1005 ml   Net 88.33 ml       Physical Exam   Constitutional: She appears well-developed and well-nourished.   Eyes: Pupils are equal, round, and reactive to light.   Neck: Normal range of motion. Neck supple.   Cardiovascular: Normal heart sounds.   Tachycardic with PVc's   Pulmonary/Chest: Effort normal and breath sounds normal.   Abdominal: Bowel sounds are normal.   Musculoskeletal: She exhibits no edema.   Neurological:   responds to painful stimuli    Skin: Skin is warm and dry.       Vents:     Lines/Drains/Airways     Drain                 Urethral Catheter 12/19/18 1955 Straight-tip less than 1 day          Peripheral Intravenous Line                 Peripheral IV - Single Lumen 12/16/18 1342 Right Forearm 3 days         Peripheral IV - Single Lumen 12/19/18 0300 Left Antecubital 1 day              Significant Labs:    CBC/Anemia Profile:  Recent Labs   Lab 12/19/18  0300 12/19/18  0950 12/20/18  0310   WBC 18.63*  --  15.88*   HGB 15.6  --  14.9   HCT 48.8*  --  46.7     --  132*   MCV 87  --  85   RDW 14.8*  --  14.8*   FOLATE  --  39.5*  --    [id number]  --  632  --          Chemistries:  Recent Labs   Lab 12/18/18 2117 12/19/18  0300 12/20/18  0310    137 138   K 3.0* 3.6 3.9    98 102   CO2 24 23 22*   BUN 14 17 30*   CREATININE 1.0 1.1 1.6*   CALCIUM 9.8 9.9 8.9   ALBUMIN  --  3.9 3.4*   PROT  --  7.7 7.2   BILITOT  --  1.5* 1.3*   ALKPHOS  --  54* 45*   ALT  --  9* 8*   AST  --  13 12   MG  --  1.7 1.7   PHOS  --  2.7 3.6       A1C: No results for input(s): HGBA1C in the last 48 hours.  ABGs:   Recent Labs   Lab 12/19/18 0317   PH 7.448   PCO2 34.1*   HCO3 23.6*   POCSATURATED 80*   BE 0     Bilirubin:   Recent Labs   Lab 12/16/18  1350 12/17/18  0446 12/18/18  0543 12/19/18  0300 12/20/18  0310   BILIDIR  --   --   --  0.5*  --    BILITOT 0.8 0.5 0.7 1.5* 1.3*     Blood Culture:   Recent Labs   Lab 12/19/18  0300 12/19/18  0320   LABBLOO No Growth to date  No Growth to date No Growth to date  No Growth to date     BMP:   Recent Labs   Lab 12/20/18  0310   *      K 3.9      CO2 22*   BUN 30*   CREATININE 1.6*   CALCIUM 8.9   MG 1.7     CMP:   Recent Labs   Lab 12/18/18  2117 12/19/18  0300 12/20/18  0310    137 138   K 3.0* 3.6 3.9    98 102   CO2 24 23 22*    94 121*   BUN 14 17 30*   CREATININE 1.0 1.1 1.6*   CALCIUM 9.8 9.9 8.9   PROT  --  7.7 7.2   ALBUMIN  --  3.9 3.4*   BILITOT  --  1.5* 1.3*   ALKPHOS  --  54* 45*   AST  --  13 12   ALT  --  9* 8*   ANIONGAP 14 16 14   EGFRNONAA 53.0* 47.2* 30.0*     Cardiac Markers: No results for input(s): CKMB, TROPONINT, MYOGLOBIN in the last 48 hours.  Coagulation: No results for input(s): PT, INR, APTT in the last 48 hours.  Lactic Acid:   Recent Labs   Lab 12/18/18  2117   LACTATE 1.6     Lipid Panel: No results for input(s): CHOL, HDL, LDLCALC, TRIG, CHOLHDL in the last 48 hours.  Pathology Results  (Last 10 years)    None        POCT Glucose:   Recent Labs   Lab 12/19/18  0046 12/19/18  1102   POCTGLUCOSE 103 121*     Prealbumin: No results for input(s): PREALBUMIN in the last  48 hours.  Respiratory Culture: No results for input(s): GSRESP, RESPIRATORYC in the last 48 hours.  Troponin: No results for input(s): TROPONINI in the last 48 hours.  Urine Culture: No results for input(s): LABURIN in the last 48 hours.  Urine Studies:   Recent Labs   Lab 12/19/18  0500   COLORU Sissy   APPEARANCEUA Clear   PHUR 7.0   SPECGRAV 1.020   PROTEINUA 2+*   GLUCUA Negative   KETONESU 1+*   BILIRUBINUA Negative   OCCULTUA Negative   NITRITE Negative   LEUKOCYTESUR Negative   RBCUA 3   WBCUA 2   BACTERIA Rare   SQUAMEPITHEL 0   HYALINECASTS 1     All pertinent labs within the past 24 hours have been reviewed.    Significant Imaging:  I have reviewed and interpreted all pertinent imaging results/findings within the past 24 hours.

## 2018-12-20 NOTE — ASSESSMENT & PLAN NOTE
Dx 2012 with Dr No, then Kiera  HCQ since 2012  Humira one dose April 2018 followed by ICU admission for pnemonia and resp failure

## 2018-12-20 NOTE — SUBJECTIVE & OBJECTIVE
Subjective:     Interval History: Patient does not follow commands this morning. R>L movement and some hyperreflexia noted on the L. Patient w/o obvious gaze deviation, crosses midline.   Per family, was restless at time overnight and did show episodic signs of agitation overnight.     Current Neurological Medications:     Current Facility-Administered Medications   Medication Dose Route Frequency Provider Last Rate Last Dose    acetaminophen suppository 650 mg  650 mg Rectal Q6H PRN Kristen Correa MD   650 mg at 12/20/18 0828    acyclovir (ZOVIRAX) 520 mg in dextrose 5 % 100 mL IVPB  10 mg/kg (Ideal) Intravenous Q12H Dayan Martinez  mL/hr at 12/20/18 1130 520 mg at 12/20/18 1130    [START ON 12/21/2018] ceFEPIme injection 2 g  2 g Intravenous Q24H Dayan Martinez MD        dextrose 5 % and 0.45 % NaCl infusion   Intravenous Continuous Dayan Martinez  mL/hr at 12/20/18 1602      dextrose 50 % in water (D50W) injection 25 g  25 g Intravenous PRN Karen Toledo MD        dextrose 50% injection 12.5 g  12.5 g Intravenous PRN Karen Toledo MD        dextrose 50% injection 12.5 g  12.5 g Intravenous PRN MERARY Anguiano MD        fentaNYL (SUBLIMAZE) 50 mcg/mL injection             fentaNYL injection 50 mcg  50 mcg Intravenous Once Rogelio Mijares MD        glucagon (human recombinant) injection 1 mg  1 mg Intramuscular PRN Karen Toledo MD        hydrALAZINE injection 5 mg  5 mg Intravenous Q4H PRN Rogelio Mijares MD        methylPREDNISolone sodium succinate injection 20 mg  20 mg Intravenous Daily Rogelio Mijares MD   20 mg at 12/20/18 1125    midazolam (VERSED) 1 mg/mL injection 2 mg  2 mg Intravenous Once Rogelio Mijares MD        ondansetron injection 4 mg  4 mg Intravenous Q6H PRN Shelli Bernstein NP   4 mg at 12/18/18 1845    sodium chloride 0.9% flush 5 mL  5 mL Intravenous PRN Karen Toledo MD         vancomycin in dextrose 5 % 1 gram/250 mL IVPB 1,000 mg  15 mg/kg Intravenous Daily Dayan Martinez MD   1,000 mg at 12/20/18 0413       Review of Systems   Unable to perform ROS: Acuity of condition     Objective:     Vital Signs (Most Recent):  Temp: (!) 100.4 °F (38 °C) (12/20/18 0828)  Pulse: 106 (12/20/18 1700)  Resp: (!) 27 (12/20/18 1700)  BP: (!) 124/58 (12/20/18 1700)  SpO2: 95 % (12/20/18 1700) Vital Signs (24h Range):  Temp:  [99 °F (37.2 °C)-100.4 °F (38 °C)] 100.4 °F (38 °C)  Pulse:  [100-112] 106  Resp:  [16-42] 27  SpO2:  [93 %-98 %] 95 %  BP: (104-179)/(55-92) 124/58     Weight: 72.4 kg (159 lb 9.8 oz)  Body mass index is 28.27 kg/m².    Physical Exam    General:  Well-developed, well-nourished,  doesn't follow commands. Opens eyes spontaneoulsy   HEENT:  NCAT, PERRLA, oropharyngeal membranes non-erythematous/without exudate  Neck:  Supple, normal ROM without nuchal rigidity  Resp:  Symmetric expansion, no increased wob  CVS:  No LE edema, peripheral pulses 2+ (radial, dorsalis pedis)  GI:  Abd soft, non-distended, non-tender to palpation  Neurologic Exam:  Mental Status:  Lethargic, unable to access   Cranial Nerves:  PERRLA, R gaze preference but able to overcome w/ occulo-cephalic maneuvers and also at times she crosses midline on her own. Difficult to assess 2/2 patient's condition .  Motor:  Normal bulk and tone.  Moves all extremities w/ noxious stimuli although R>L  Sensory: Noxious stimuli only, unable to assess light touch   Reflexes: R Biceps, brachioradialis, patellar, Achilles 1+.    L Biceps, brachioradialis 3+, patellar, Achilles 2+  Coordination: Noted periodic LUE rhythmic jerking during the exam that subsided.   Gait: Deferred           Significant Labs:   Hemoglobin A1c: No results for input(s): HGBA1C in the last 720 hours.  Blood Culture:   Recent Labs   Lab 12/19/18  0300 12/19/18  0320   LABBLOO No Growth to date  No Growth to date No Growth to date  No Growth  to date     BMP:   Recent Labs   Lab 12/18/18 2117 12/19/18  0300 12/20/18  0310    94 121*    137 138   K 3.0* 3.6 3.9    98 102   CO2 24 23 22*   BUN 14 17 30*   CREATININE 1.0 1.1 1.6*   CALCIUM 9.8 9.9 8.9   MG  --  1.7 1.7     CBC:   Recent Labs   Lab 12/19/18 0300 12/20/18  0310   WBC 18.63* 15.88*   HGB 15.6 14.9   HCT 48.8* 46.7    132*     CMP:   Recent Labs   Lab 12/18/18 2117 12/19/18  0300 12/20/18  0310    94 121*    137 138   K 3.0* 3.6 3.9    98 102   CO2 24 23 22*   BUN 14 17 30*   CREATININE 1.0 1.1 1.6*   CALCIUM 9.8 9.9 8.9   MG  --  1.7 1.7   PROT  --  7.7 7.2   ALBUMIN  --  3.9 3.4*   BILITOT  --  1.5* 1.3*   ALKPHOS  --  54* 45*   AST  --  13 12   ALT  --  9* 8*   ANIONGAP 14 16 14   EGFRNONAA 53.0* 47.2* 30.0*     CSF Culture: No results for input(s): CSFCULTURE in the last 48 hours.  CSF Studies: No results for input(s): ALIQUT, APPEARCSF, COLORCSF, CSFWBC, CSFRBC, GLUCCSF, LDHCSF, PROTEINCSF, VDRLCSF in the last 48 hours.  Inflammatory Markers:   Recent Labs   Lab 12/18/18 2117   PROCAL 0.17     POCT Glucose:   No results for input(s): POCTGLUCOSE in the last 24 hours.  Prealbumin: No results for input(s): PREALBUMIN in the last 48 hours.  Respiratory Culture: No results for input(s): GSRESP, RESPIRATORYC in the last 48 hours.  Urine Culture: No results for input(s): LABURIN in the last 48 hours.  Urine Studies:   Recent Labs   Lab 12/20/18  1132   COLORU Sissy   APPEARANCEUA Cloudy*   PHUR 5.0   SPECGRAV 1.015   PROTEINUA 2+*   GLUCUA Negative   KETONESU Trace*   BILIRUBINUA Negative   OCCULTUA Negative   NITRITE Negative   LEUKOCYTESUR 2+*   RBCUA 1   WBCUA 32*   BACTERIA Occasional   SQUAMEPITHEL 1   HYALINECASTS 0     All pertinent lab results from the past 24 hours have been reviewed.    Significant Imaging: I have reviewed all pertinent imaging results/findings within the past 24 hours.   EEG  There is evidence for R hemispheric cortical  dysfunction consistent with a structural lesion and moderate generalized encephalopathy possibly due to toxic/metabolic etiology or medication effect. No seizures were recorded during this study.

## 2018-12-20 NOTE — PLAN OF CARE
Problem: Adult Inpatient Plan of Care  Goal: Plan of Care Review  Outcome: Ongoing (interventions implemented as appropriate)  Recommendations     1. If able to advance diet, recommend Regular diet (texture per SLP).   2. If unable to advance diet, place NGT & initiate enteral nutrition. Recommend Isosource 1.5 @ 40 mL/hr to provide 1440 kcals, 65 g of protein, 733 mL fluid.               - Hold for residuals >500 mL; additional fluid per MD.  3. RD to monitor & follow-up.

## 2018-12-20 NOTE — ASSESSMENT & PLAN NOTE
-- baseline Cret 1.1.  12/20 FC was inserted due to retention, Cret increased to 1.6.  -- Urine lytes ordered.  -- strict in and out.  -- avoid nephrotoxic medication.

## 2018-12-21 ENCOUNTER — ANESTHESIA EVENT (OUTPATIENT)
Dept: INTENSIVE CARE | Facility: HOSPITAL | Age: 83
DRG: 097 | End: 2018-12-21
Payer: MEDICARE

## 2018-12-21 ENCOUNTER — ANESTHESIA (OUTPATIENT)
Dept: INTENSIVE CARE | Facility: HOSPITAL | Age: 83
DRG: 097 | End: 2018-12-21
Payer: MEDICARE

## 2018-12-21 LAB
ALBUMIN SERPL BCP-MCNC: 3.3 G/DL
ALP SERPL-CCNC: 45 U/L
ALT SERPL W/O P-5'-P-CCNC: 8 U/L
ANION GAP SERPL CALC-SCNC: 13 MMOL/L
ANISOCYTOSIS BLD QL SMEAR: SLIGHT
AST SERPL-CCNC: 12 U/L
BASOPHILS # BLD AUTO: 0.04 K/UL
BASOPHILS NFR BLD: 0.2 %
BILIRUB SERPL-MCNC: 0.9 MG/DL
BUN SERPL-MCNC: 43 MG/DL
BURR CELLS BLD QL SMEAR: ABNORMAL
CALCIUM SERPL-MCNC: 8.8 MG/DL
CHLORIDE SERPL-SCNC: 103 MMOL/L
CO2 SERPL-SCNC: 18 MMOL/L
CREAT SERPL-MCNC: 1.8 MG/DL
DIFFERENTIAL METHOD: ABNORMAL
EOSINOPHIL # BLD AUTO: 0.1 K/UL
EOSINOPHIL NFR BLD: 0.3 %
ERYTHROCYTE [DISTWIDTH] IN BLOOD BY AUTOMATED COUNT: 14.7 %
EST. GFR  (AFRICAN AMERICAN): 30 ML/MIN/1.73 M^2
EST. GFR  (NON AFRICAN AMERICAN): 26 ML/MIN/1.73 M^2
GLUCOSE SERPL-MCNC: 164 MG/DL
HCT VFR BLD AUTO: 48.7 %
HGB BLD-MCNC: 15.3 G/DL
IMM GRANULOCYTES # BLD AUTO: 0.07 K/UL
IMM GRANULOCYTES NFR BLD AUTO: 0.4 %
LYMPHOCYTES # BLD AUTO: 1.7 K/UL
LYMPHOCYTES NFR BLD: 10.7 %
MAGNESIUM SERPL-MCNC: 2.2 MG/DL
MCH RBC QN AUTO: 27.6 PG
MCHC RBC AUTO-ENTMCNC: 31.4 G/DL
MCV RBC AUTO: 88 FL
MONOCYTES # BLD AUTO: 3.6 K/UL
MONOCYTES NFR BLD: 22.4 %
NEUTROPHILS # BLD AUTO: 10.6 K/UL
NEUTROPHILS NFR BLD: 66 %
NRBC BLD-RTO: 0 /100 WBC
PHOSPHATE SERPL-MCNC: 3.2 MG/DL
PLATELET # BLD AUTO: 150 K/UL
PLATELET BLD QL SMEAR: ABNORMAL
PMV BLD AUTO: 10.5 FL
POIKILOCYTOSIS BLD QL SMEAR: SLIGHT
POTASSIUM SERPL-SCNC: 3.5 MMOL/L
PROT SERPL-MCNC: 7.4 G/DL
RBC # BLD AUTO: 5.54 M/UL
SODIUM SERPL-SCNC: 134 MMOL/L
VANCOMYCIN TROUGH SERPL-MCNC: 13.9 UG/ML
WBC # BLD AUTO: 16.14 K/UL

## 2018-12-21 PROCEDURE — G8996 SWALLOW CURRENT STATUS: HCPCS | Mod: CJ

## 2018-12-21 PROCEDURE — 99233 SBSQ HOSP IP/OBS HIGH 50: CPT | Mod: GC,,, | Performed by: INTERNAL MEDICINE

## 2018-12-21 PROCEDURE — 80053 COMPREHEN METABOLIC PANEL: CPT

## 2018-12-21 PROCEDURE — G8997 SWALLOW GOAL STATUS: HCPCS | Mod: CH

## 2018-12-21 PROCEDURE — 83735 ASSAY OF MAGNESIUM: CPT

## 2018-12-21 PROCEDURE — S5010 5% DEXTROSE AND 0.45% SALINE: HCPCS | Performed by: STUDENT IN AN ORGANIZED HEALTH CARE EDUCATION/TRAINING PROGRAM

## 2018-12-21 PROCEDURE — 25000003 PHARM REV CODE 250: Performed by: STUDENT IN AN ORGANIZED HEALTH CARE EDUCATION/TRAINING PROGRAM

## 2018-12-21 PROCEDURE — 20000000 HC ICU ROOM

## 2018-12-21 PROCEDURE — 85025 COMPLETE CBC W/AUTO DIFF WBC: CPT

## 2018-12-21 PROCEDURE — 25000003 PHARM REV CODE 250

## 2018-12-21 PROCEDURE — 63600175 PHARM REV CODE 636 W HCPCS

## 2018-12-21 PROCEDURE — 99233 PR SUBSEQUENT HOSPITAL CARE,LEVL III: ICD-10-PCS | Mod: GC,,, | Performed by: INTERNAL MEDICINE

## 2018-12-21 PROCEDURE — 84100 ASSAY OF PHOSPHORUS: CPT

## 2018-12-21 PROCEDURE — 99232 PR SUBSEQUENT HOSPITAL CARE,LEVL II: ICD-10-PCS | Mod: ,,, | Performed by: PSYCHIATRY & NEUROLOGY

## 2018-12-21 PROCEDURE — 63600175 PHARM REV CODE 636 W HCPCS: Performed by: STUDENT IN AN ORGANIZED HEALTH CARE EDUCATION/TRAINING PROGRAM

## 2018-12-21 PROCEDURE — 80202 ASSAY OF VANCOMYCIN: CPT

## 2018-12-21 PROCEDURE — 63600175 PHARM REV CODE 636 W HCPCS: Performed by: INTERNAL MEDICINE

## 2018-12-21 PROCEDURE — 99232 SBSQ HOSP IP/OBS MODERATE 35: CPT | Mod: ,,, | Performed by: PSYCHIATRY & NEUROLOGY

## 2018-12-21 PROCEDURE — 92610 EVALUATE SWALLOWING FUNCTION: CPT

## 2018-12-21 RX ORDER — LIDOCAINE HYDROCHLORIDE 10 MG/ML
10 INJECTION, SOLUTION EPIDURAL; INFILTRATION; INTRACAUDAL; PERINEURAL ONCE
Status: COMPLETED | OUTPATIENT
Start: 2018-12-21 | End: 2018-12-21

## 2018-12-21 RX ORDER — FENTANYL CITRATE 50 UG/ML
25 INJECTION, SOLUTION INTRAMUSCULAR; INTRAVENOUS ONCE
Status: DISCONTINUED | OUTPATIENT
Start: 2018-12-21 | End: 2018-12-23

## 2018-12-21 RX ORDER — MIDAZOLAM HYDROCHLORIDE 1 MG/ML
2 INJECTION INTRAMUSCULAR; INTRAVENOUS ONCE
Status: COMPLETED | OUTPATIENT
Start: 2018-12-21 | End: 2018-12-21

## 2018-12-21 RX ORDER — LIDOCAINE HYDROCHLORIDE 10 MG/ML
10 INJECTION, SOLUTION EPIDURAL; INFILTRATION; INTRACAUDAL; PERINEURAL ONCE
Status: DISCONTINUED | OUTPATIENT
Start: 2018-12-21 | End: 2018-12-23

## 2018-12-21 RX ORDER — MIDAZOLAM HYDROCHLORIDE 1 MG/ML
2 INJECTION INTRAMUSCULAR; INTRAVENOUS ONCE
Status: DISCONTINUED | OUTPATIENT
Start: 2018-12-21 | End: 2018-12-21

## 2018-12-21 RX ORDER — MIDAZOLAM HYDROCHLORIDE 1 MG/ML
INJECTION INTRAMUSCULAR; INTRAVENOUS
Status: DISCONTINUED
Start: 2018-12-21 | End: 2018-12-22 | Stop reason: WASHOUT

## 2018-12-21 RX ORDER — HALOPERIDOL 5 MG/ML
3 INJECTION INTRAMUSCULAR ONCE
Status: COMPLETED | OUTPATIENT
Start: 2018-12-21 | End: 2018-12-21

## 2018-12-21 RX ORDER — FENTANYL CITRATE 50 UG/ML
INJECTION, SOLUTION INTRAMUSCULAR; INTRAVENOUS
Status: DISCONTINUED
Start: 2018-12-21 | End: 2018-12-22 | Stop reason: WASHOUT

## 2018-12-21 RX ORDER — MIDAZOLAM HYDROCHLORIDE 1 MG/ML
INJECTION INTRAMUSCULAR; INTRAVENOUS
Status: COMPLETED
Start: 2018-12-21 | End: 2018-12-21

## 2018-12-21 RX ORDER — LIDOCAINE HYDROCHLORIDE 10 MG/ML
INJECTION, SOLUTION EPIDURAL; INFILTRATION; INTRACAUDAL; PERINEURAL
Status: COMPLETED
Start: 2018-12-21 | End: 2018-12-21

## 2018-12-21 RX ORDER — LORAZEPAM 2 MG/ML
2 INJECTION INTRAMUSCULAR ONCE
Status: DISCONTINUED | OUTPATIENT
Start: 2018-12-21 | End: 2018-12-23

## 2018-12-21 RX ORDER — FENTANYL CITRATE 50 UG/ML
INJECTION, SOLUTION INTRAMUSCULAR; INTRAVENOUS
Status: COMPLETED
Start: 2018-12-21 | End: 2018-12-21

## 2018-12-21 RX ORDER — LORAZEPAM 2 MG/ML
INJECTION INTRAMUSCULAR
Status: DISPENSED
Start: 2018-12-21 | End: 2018-12-22

## 2018-12-21 RX ORDER — LORAZEPAM 2 MG/ML
2 INJECTION INTRAMUSCULAR ONCE
Status: COMPLETED | OUTPATIENT
Start: 2018-12-21 | End: 2018-12-21

## 2018-12-21 RX ORDER — VANCOMYCIN HCL IN 5 % DEXTROSE 1G/250ML
1000 PLASTIC BAG, INJECTION (ML) INTRAVENOUS
Status: DISCONTINUED | OUTPATIENT
Start: 2018-12-22 | End: 2018-12-23

## 2018-12-21 RX ADMIN — CEFEPIME 2 G: 2 INJECTION, POWDER, FOR SOLUTION INTRAVENOUS at 12:12

## 2018-12-21 RX ADMIN — MIDAZOLAM HYDROCHLORIDE 1 MG: 1 INJECTION, SOLUTION INTRAMUSCULAR; INTRAVENOUS at 09:12

## 2018-12-21 RX ADMIN — FENTANYL CITRATE 25 MCG: 50 INJECTION INTRAMUSCULAR; INTRAVENOUS at 09:12

## 2018-12-21 RX ADMIN — METHYLPREDNISOLONE SODIUM SUCCINATE 20 MG: 40 INJECTION, POWDER, FOR SOLUTION INTRAMUSCULAR; INTRAVENOUS at 09:12

## 2018-12-21 RX ADMIN — HALOPERIDOL LACTATE 3 MG: 5 INJECTION, SOLUTION INTRAMUSCULAR at 06:12

## 2018-12-21 RX ADMIN — LORAZEPAM 2 MG: 2 INJECTION INTRAMUSCULAR; INTRAVENOUS at 07:12

## 2018-12-21 RX ADMIN — HYDRALAZINE HYDROCHLORIDE 5 MG: 20 INJECTION INTRAMUSCULAR; INTRAVENOUS at 02:12

## 2018-12-21 RX ADMIN — DEXTROSE AND SODIUM CHLORIDE: 5; .45 INJECTION, SOLUTION INTRAVENOUS at 02:12

## 2018-12-21 RX ADMIN — VANCOMYCIN HYDROCHLORIDE 1250 MG: 10 INJECTION, POWDER, LYOPHILIZED, FOR SOLUTION INTRAVENOUS at 09:12

## 2018-12-21 RX ADMIN — ACYCLOVIR SODIUM 520 MG: 50 INJECTION, SOLUTION INTRAVENOUS at 09:12

## 2018-12-21 RX ADMIN — MIDAZOLAM HYDROCHLORIDE 1 MG: 1 INJECTION INTRAMUSCULAR; INTRAVENOUS at 09:12

## 2018-12-21 RX ADMIN — LIDOCAINE HYDROCHLORIDE 50 MG: 10 INJECTION, SOLUTION EPIDURAL; INFILTRATION; INTRACAUDAL; PERINEURAL at 09:12

## 2018-12-21 NOTE — PLAN OF CARE
Problem: Adult Inpatient Plan of Care  Goal: Plan of Care Review  Outcome: Ongoing (interventions implemented as appropriate)  No acute events throughout day. See vital signs and assessments in flowsheets. See below for updates on today's progress.     Pulmonary: pt on RA,   Cardiovascular: ST, HR 110s. PRN hydralazine given x1 for SBP >180  Neurological: disoriented x4 at start of shift and non verbal, throughout shift pt started to say words and at 5am pt was able to answer questions with delayed responses AAOx3 @5am  Gastrointestinal: NPO, no BM  Genitourinary: parikh in place good flow / shift   Integumentary/Other: pt repeatedly tried to get out of bed, bed alarm set and redirected pt.    Patient progressing towards goals as tolerated, plan of care communicated and reviewed with Selma Lux MD and family. All concerns addressed. Will continue to monitor.

## 2018-12-21 NOTE — PROGRESS NOTES
[facility]  Neurology  Progress Note    Patient Name: Selma Rosado MD Jose J  MRN: [id number]  Admission Date: 12/16/2018  Hospital Length of Stay: 2 days  Code Status: Full Code   Attending Provider: Rogelio Mijares MD  Primary Care Physician: Bhargav Hirsch MD   Principal Problem:Acute encephalopathy      Subjective:     Interval History: Patient much more alert and responsive today. Opens eyes spontaneously, conversing w/ family and sitter at bedside. Able to follow commands and oriented to self, time and situation. According to the nursing staff, patient waxes and wanes and at times is AXOx4.    Current Neurological Medications:     Current Facility-Administered Medications   Medication Dose Route Frequency Provider Last Rate Last Dose    acetaminophen suppository 650 mg  650 mg Rectal Q6H PRN Kristen Correa MD   650 mg at 12/20/18 0828    acyclovir (ZOVIRAX) 520 mg in dextrose 5 % 100 mL IVPB  10 mg/kg (Ideal) Intravenous Q24H Rogelio Mijares MD        ceFEPIme injection 2 g  2 g Intravenous Q24H Dayan Martinez MD   2 g at 12/21/18 1225    dextrose 5 % and 0.45 % NaCl infusion   Intravenous Continuous Dayan Martinez  mL/hr at 12/21/18 1401      dextrose 50 % in water (D50W) injection 25 g  25 g Intravenous PRN Karen Toledo MD        dextrose 50% injection 12.5 g  12.5 g Intravenous PRN Karen Toledo MD        dextrose 50% injection 12.5 g  12.5 g Intravenous PRN MERARY Anguiano MD        fentaNYL (SUBLIMAZE) 50 mcg/mL injection             fentaNYL injection 25 mcg  25 mcg Intravenous Once Christine Cantrell MD        glucagon (human recombinant) injection 1 mg  1 mg Intramuscular PRN Karen Toledo MD        hydrALAZINE injection 5 mg  5 mg Intravenous Q4H PRN Rogelio Mijares MD   5 mg at 12/21/18 1429    lidocaine (PF) 10 mg/ml (1%) injection 100 mg  10 mL Other Once Christine Cantrell MD        methylPREDNISolone  "sodium succinate injection 20 mg  20 mg Intravenous Daily Rogelio Mijares MD   20 mg at 12/21/18 0917    midazolam (VERSED) 1 mg/mL injection 2 mg  2 mg Intravenous Once Christine Cantrell MD        midazolam (VERSED) 1 mg/mL injection             ondansetron injection 4 mg  4 mg Intravenous Q6H PRN Shelli Bernstein NP   4 mg at 12/18/18 1845    sodium chloride 0.9% flush 5 mL  5 mL Intravenous PRN Karen Toledo MD        [START ON 12/22/2018] vancomycin in dextrose 5 % 1 gram/250 mL IVPB 1,000 mg  1,000 mg Intravenous Q24H Jody Landa MD           Review of Systems   Constitutional: Positive for activity change, fatigue and fever.   HENT: Negative.    Gastrointestinal: Negative for nausea and vomiting.   Endocrine: Negative.    Genitourinary: Negative.    Skin: Negative.    Allergic/Immunologic: Negative.    Neurological: Positive for weakness.   Psychiatric/Behavioral: Positive for agitation and confusion.     Objective:     Vital Signs (Most Recent):  Temp: 97.4 °F (36.3 °C) (12/21/18 0700)  Pulse: 110 (12/21/18 1400)  Resp: (!) 26 (12/21/18 1400)  BP: (!) 184/92 (12/21/18 1400)  SpO2: 99 % (12/21/18 1400) Vital Signs (24h Range):  Temp:  [97.4 °F (36.3 °C)-98.5 °F (36.9 °C)] 97.4 °F (36.3 °C)  Pulse:  [] 110  Resp:  [19-33] 26  SpO2:  [92 %-99 %] 99 %  BP: (116-184)/(56-92) 184/92     Weight: 72.4 kg (159 lb 9.8 oz)  Body mass index is 28.27 kg/m².    Physical Exam  General:  Well-developed, well-nourished, nad, some confusion   HEENT:  NCAT, PERRLA, oropharyngeal membranes non-erythematous/without exudate  Neck:  Supple, normal ROM without nuchal rigidity  Resp:  Symmetric expansion, no increased wob  CVS:  No LE edema, peripheral pulses 2+ (radial, dorsalis pedis)  GI:  Abd soft, non-distended, non-tender to palpation  Neurologic Exam:  Mental Status:  A&Ox2, some confusion and perseveration on "eventful morning" and how "so much stuff that happened".  Cranial Nerves:  PERRLA, EOMI, no " deficits noted.   Motor:  Normal bulk and tone.  Moves all extremities w/ noxious stimuli although R>L.  Sensory: Noxious stimuli only, unable to assess light touch   Reflexes: R Biceps, brachioradialis, patellar, Achilles 1+.    L Biceps, brachioradialis 3+, patellar, Achilles 2+  Coordination: Does not follow directions well.  Gait: Deferred           Significant Labs:   Hemoglobin A1c: No results for input(s): HGBA1C in the last 720 hours.  Blood Culture:   No results for input(s): LABBLOO in the last 48 hours.  BMP:   Recent Labs   Lab 12/20/18 0310 12/21/18 0412   * 164*    134*   K 3.9 3.5    103   CO2 22* 18*   BUN 30* 43*   CREATININE 1.6* 1.8*   CALCIUM 8.9 8.8   MG 1.7 2.2     CBC:   Recent Labs   Lab 12/20/18 0310 12/21/18 0412   WBC 15.88* 16.14*   HGB 14.9 15.3   HCT 46.7 48.7*   * 150     CMP:   Recent Labs   Lab 12/20/18 0310 12/21/18 0412   * 164*    134*   K 3.9 3.5    103   CO2 22* 18*   BUN 30* 43*   CREATININE 1.6* 1.8*   CALCIUM 8.9 8.8   MG 1.7 2.2   PROT 7.2 7.4   ALBUMIN 3.4* 3.3*   BILITOT 1.3* 0.9   ALKPHOS 45* 45*   AST 12 12   ALT 8* 8*   ANIONGAP 14 13   EGFRNONAA 30.0* 26.0*     CSF Culture: No results for input(s): CSFCULTURE in the last 48 hours.  CSF Studies: No results for input(s): ALIQUT, APPEARCSF, COLORCSF, CSFWBC, CSFRBC, GLUCCSF, LDHCSF, PROTEINCSF, VDRLCSF in the last 48 hours.  Inflammatory Markers:   No results for input(s): SEDRATE, CRP, PROCAL in the last 48 hours.  POCT Glucose:   Recent Labs   Lab 12/20/18  1812   POCTGLUCOSE 233*     Prealbumin: No results for input(s): PREALBUMIN in the last 48 hours.  Respiratory Culture: No results for input(s): GSRESP, RESPIRATORYC in the last 48 hours.  Urine Culture: No results for input(s): LABURIN in the last 48 hours.  Urine Studies:   Recent Labs   Lab 12/20/18  1132   COLORU Sissy   APPEARANCEUA Cloudy*   PHUR 5.0   SPECGRAV 1.015   PROTEINUA 2+*   GLUCUA Negative   KETONESU  Trace*   BILIRUBINUA Negative   OCCULTUA Negative   NITRITE Negative   LEUKOCYTESUR 2+*   RBCUA 1   WBCUA 32*   BACTERIA Occasional   SQUAMEPITHEL 1   HYALINECASTS 0     All pertinent lab results from the past 24 hours have been reviewed.    Significant Imaging: I have reviewed all pertinent imaging results/findings within the past 24 hours.   EEG  There is evidence for R hemispheric cortical dysfunction consistent with a structural lesion and moderate generalized encephalopathy possibly due to toxic/metabolic etiology or medication effect. No seizures were recorded during this study.    Assessment and Plan:     * Acute encephalopathy    Initial presentation of blurry vision, consistent w/ HTN encephalopathy. Patient waxed and waned from baseline to more confusing during her stay in the observation unit. Was more somnolent on 12/18 and oral medication was stopped 2/2 aspiration risk and some difficulty w/ BP control w/ IV meds due to availability of medication as well as observation status. Had worsening mentation throughout the day and developed a fever ( Tmax 102.6), tachycardia, increased RR and persistently elevated BP.  She was transferred to MICU for increased level of care overnight on 12/19.  Significantly improved today. Able to hold a conversation, although remains confused. Follows commands, L side slightly weaker than the R. MRI w/o any evidence of acute intracranial pathology.  Likely combination of infectious etiology as well as now normalized BP, allowing for resolution of HTN encephalopathy as well.     - MRI - no intracranial acute pathology   - Agree with LP; unable to obtain on the floor despite multiple attempts 2/2 difficult bony anatomy in this patient w/ scoliosis ; scheduled for IR guided LP  - Would recommend continuous EEG placement after the above studies completed for evaluation of episodic witnessed LUE/LLE rhythmic jerking to r/o seizure   - EEG w/ previous R hemispheric slowing   -  Antibiotics broadened as well as Acyclovir added for viral coverage.Patient is immunocompromised 2/2 RA medication ( steroids, DMARD)   - Noted to have a dirty UA on 12/20  - CXR w/ persistent patchy consolidation, although appears more chronic in nature.   - B1 45, low end of normal, can consider supplementation, although not the etiology of patient's current condition   - Will continue to follow patient.      History of stroke    - CTH w/o acute intracranial changes on repeat imaging on 12/18     Vascular dementia    - At baseline     Hypertension, essential    - Management per primary team          VTE Risk Mitigation (From admission, onward)        Ordered     Place sequential compression device  Until discontinued      12/21/18 1024     IP VTE HIGH RISK PATIENT  Once      12/16/18 1933          Nereida English MD  Neurology  [facility]

## 2018-12-21 NOTE — PLAN OF CARE
Problem: Adult Inpatient Plan of Care  Goal: Plan of Care Review  Outcome: Ongoing (interventions implemented as appropriate)  No acute events throughout day. See vital signs and assessments in flowsheets. See below for updates on today's progress.     Pulmonary: Sats 100% on RA    Cardiovascular: -110s, -150s    Neurological: Pt opens eyes spont, does not follow commands, track, or speak. Pt occasionally starts screaming, but remains unable to follow commands or speak to staff/family. LP attempt at bedside unsuccessful, plan to consult IR for LP under flouro tomorrow.     Gastrointestinal: No BM, no NGT, pt NPO for shift.    Genitourinary: Hill output 200cc for shift, D5 1/2NS started @ 100cc/hr    Infusions: D5 1/2NS @ 100cc/hr    Patient progressing towards goals as tolerated, plan of care communicated and reviewed with Selma Lux MD and family. All concerns addressed. Will continue to monitor.

## 2018-12-21 NOTE — ANESTHESIA PROCEDURE NOTES
Spinal    Diagnosis: acute encephalopathy  Patient location during procedure: ICU  Start time: 12/21/2018 9:05 AM  Timeout: 12/21/2018 9:05 AM  End time: 12/21/2018 9:40 AM  Staffing  Resident/CRNA: Sahara Cueto DO  Performed: resident/CRNA   Preanesthetic Checklist  Completed: patient identified, site marked, surgical consent, pre-op evaluation, timeout performed, IV checked, risks and benefits discussed and monitors and equipment checked  Spinal Block  Patient position: left lateral decubitus  Prep: Betadine  Patient monitoring: heart rate, continuous pulse ox and frequent blood pressure checks  Approach: midline  Location: L3-4  Injection technique: lumbar puncture  Needle  Needle type: Quincke   Needle gauge: 20.  Needle length: 3.5 in  Needle localization: anatomical landmarks  Additional Notes  Unsuccessful LP attempt x3.  Pt with scoliosis, inability to lie still during the procedure.

## 2018-12-21 NOTE — PROCEDURES
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    Temp: 98.3 °F (36.8 °C) (12/20/18 1900)  Pulse: 99 (12/20/18 2100)  Resp: 19 (12/20/18 2100)  BP: (!) 172/86 (12/20/18 2100)  SpO2: 99 % (12/20/18 2100)  Weight: 72.4 kg (159 lb 9.8 oz) (12/20/18 0900)  Height: 5' 3" (160 cm) (12/20/18 0900)       Lumbar Puncture  Date/Time: 12/20/2018 5:00 PM  Location procedure was performed: [facility] CRITICAL CARE MEDICINE  Performed by: Rogelio Mijares MD  Authorized by: Rogelio Mijares MD   Pre-operative diagnosis:  Acute encephalopathy  Post-operative diagnosis: acute encephalopathy  Consent Done: Yes  Indications: evaluation for altered mental status and evaluation for infection  Anesthesia: local infiltration    Anesthesia:  Local Anesthetic: lidocaine 1% without epinephrine  Anesthetic total: 3 mL  Preparation: Patient was prepped and draped in the usual sterile fashion.  Lumbar space: L3-L4 interspace  Patient's position: right lateral decubitus  Needle gauge: 20  Needle type: spinal needle - Quincke tip  Needle length: 2.5 in  Number of attempts: 3  Patient tolerance: Patient tolerated the procedure well with no immediate complications  Comments: Unsuccessful. Difficulty due to scoliosis of the lumbar spine.          Rogelio Mijares  12/20/2018  "

## 2018-12-21 NOTE — ASSESSMENT & PLAN NOTE
-- baseline Creatine 1.1.  12/20 FC was inserted due to retention. There is an upward trend of creatinine   -- Urine lytes ordered- urine sodium <20, creatinine 174  -- strict in and out.  -- avoid nephrotoxic medication. Properly renally dosed antibiotics.

## 2018-12-21 NOTE — SUBJECTIVE & OBJECTIVE
Interval History/Significant Events: Overnight patient was disoriented and nonverbal. Around 5 AM patient because more alert and was able to answer questions (with delayed responses). Patient did not have a bowel movement (NPO). SLP has saw her and recommended dental soft diet. Awaiting PT/OT evaluation    -Anesthesia and myself have tried the LP two more times however, we were unsuccessful due to the patient's difficult lumbar anatomy. IR has been re-consulted and they will attempt the LP.      Review of Systems   Unable to perform ROS: Mental status change     Objective:     Vital Signs (Most Recent):  Temp: 97.4 °F (36.3 °C) (12/21/18 0700)  Pulse: (!) 111 (12/21/18 0800)  Resp: 19 (12/21/18 0800)  BP: (!) 144/67 (12/21/18 0800)  SpO2: 97 % (12/21/18 0800) Vital Signs (24h Range):  Temp:  [97.4 °F (36.3 °C)-98.5 °F (36.9 °C)] 97.4 °F (36.3 °C)  Pulse:  [] 111  Resp:  [16-32] 19  SpO2:  [95 %-99 %] 97 %  BP: (104-180)/(55-88) 144/67   Weight: 72.4 kg (159 lb 9.8 oz)  Body mass index is 28.27 kg/m².      Intake/Output Summary (Last 24 hours) at 12/21/2018 0837  Last data filed at 12/21/2018 0800  Gross per 24 hour   Intake 1696.67 ml   Output 685 ml   Net 1011.67 ml       Physical Exam   Constitutional: She is oriented to person, place, and time. She appears well-developed and well-nourished.   HENT:   Head: Normocephalic and atraumatic.   Eyes: Lids are normal. Pupils are equal, round, and reactive to light. Right conjunctiva is injected. Left conjunctiva is injected.   Cardiovascular: S1 normal, S2 normal and normal heart sounds. Tachycardia present. Exam reveals no gallop and no friction rub.   No murmur heard.  Pulmonary/Chest: Breath sounds normal. No stridor. No respiratory distress. She has no decreased breath sounds.   Abdominal: Bowel sounds are normal. She exhibits distension.   Genitourinary:   Genitourinary Comments: Patient has parikh present     Neurological: She is alert and oriented to person,  place, and time. She displays normal reflexes. She exhibits normal muscle tone.   Delayed responses       Vents:     Lines/Drains/Airways     Drain                 Urethral Catheter 12/19/18 1955 Straight-tip 1 day          Peripheral Intravenous Line                 Peripheral IV - Single Lumen 12/16/18 1342 Right Forearm 4 days         Peripheral IV - Single Lumen 12/19/18 0300 Left Antecubital 2 days              Significant Labs:    CBC/Anemia Profile:  Recent Labs   Lab 12/19/18  0950 12/20/18  0310 12/21/18  0412   WBC  --  15.88* 16.14*   HGB  --  14.9 15.3   HCT  --  46.7 48.7*   PLT  --  132* 150   MCV  --  85 88   RDW  --  14.8* 14.7*   FOLATE 39.5*  --   --    [id number] 632  --   --         Chemistries:  Recent Labs   Lab 12/20/18  0310 12/21/18  0412    134*   K 3.9 3.5    103   CO2 22* 18*   BUN 30* 43*   CREATININE 1.6* 1.8*   CALCIUM 8.9 8.8   ALBUMIN 3.4* 3.3*   PROT 7.2 7.4   BILITOT 1.3* 0.9   ALKPHOS 45* 45*   ALT 8* 8*   AST 12 12   MG 1.7 2.2   PHOS 3.6 3.2       A1C: No results for input(s): HGBA1C in the last 48 hours.  Blood Culture: No results for input(s): LABBLOO in the last 48 hours.  BMP:   Recent Labs   Lab 12/21/18  0412   *   *   K 3.5      CO2 18*   BUN 43*   CREATININE 1.8*   CALCIUM 8.8   MG 2.2     CMP:   Recent Labs   Lab 12/20/18  0310 12/21/18  0412    134*   K 3.9 3.5    103   CO2 22* 18*   * 164*   BUN 30* 43*   CREATININE 1.6* 1.8*   CALCIUM 8.9 8.8   PROT 7.2 7.4   ALBUMIN 3.4* 3.3*   BILITOT 1.3* 0.9   ALKPHOS 45* 45*   AST 12 12   ALT 8* 8*   ANIONGAP 14 13   EGFRNONAA 30.0* 26.0*       Significant Imaging:  I have reviewed and interpreted all pertinent imaging results/findings within the past 24 hours.

## 2018-12-21 NOTE — ASSESSMENT & PLAN NOTE
84 y/o F with RA (chronically on steroids, plaquenil and sulfasalazine), history of multiple TIA's (on aspirin and plavix),  And vascular dementia presents to the ED on 12/17/18 with a change in her mentation status with blurry vision (baseline able to perform all ADLS exceptionally) as well as right sided leg pain. The patient denies any numbness or tingling in any of her extremities. Of note, the patient ran out of her prednisone 2 days prior to admission. She has been taking 25 mg daily. On arrival to the ED patient had SBP in the 220 (BL -130). She received 2 doses of Labetalol 20mg IV x 2 with improvement in her blood pressure to the 180s.  Head CT and MRI in the ED were negative for any acute process. EEG 12/19 showed toxic medical encephalopathy without epileptic waves. Repeat MRI does not show any intracranial processes.    Plan  1) IR to obtain LP as we had three failed attempts on the floor.    2) Encephalopathy most likely hypertensive as patient had blurry vision as well as hypertension. CT and Mri negative for stroke. BP control, pt unable to take orally. On IV hydralazine prn for  SBP>180   3) Another cause of encephalopathy can be due to an infectious process. Patient empirically started on vancomycin, cefepime, and acyclovir. WBC on 12/21 is 16.14.   4) Will obtain EEG again as per neuro recommendations

## 2018-12-21 NOTE — SIGNIFICANT EVENT
· LP was attempted by ICU staff, procedure was difficult due to pre-exciting scoliosis and was unsuccessful.  · LP with FL was ordered.            Molina Monte MD  Internal Medicine  PGY-2

## 2018-12-21 NOTE — PLAN OF CARE
Problem: SLP Goal  Goal: SLP Goal  Speech Language Pathology Goals  Goals expected to be met by 12/28  1. Pt will tolerate a dental soft diet and thin liquids with no overt signs of airway compromise.        Bedside swallow eval completed with implemented plan of care.   Emily P. Abadie M.S., CCC-SLP  Speech Language Pathologist  [phone]  12/21/2018

## 2018-12-21 NOTE — ASSESSMENT & PLAN NOTE
· Reports confusion and blurred vision x 2 days, with  on arrival - reports baseline BP to be 110-130s  · S/p Labetalol 20mg IV x 2 doses with improvement in BP to 180s  · Head CT and MRI negative for acute process. Repeat MRI negative which shows no acute intracranial abnormality.   · Takes Coreg 12.5mg PO BID at home.Holding coreg until she is able to swallow. Will increase to 25 mg   · Since pt is not safe to take orally at this time, Continue hydralazine 5 mg IV prn for target SBP <180

## 2018-12-21 NOTE — PT/OT/SLP EVAL
Speech Language Pathology Evaluation  Bedside Swallow    Patient Name:  Selma Lux MD   MRN:  [id number]  Admitting Diagnosis: Acute encephalopathy    Recommendations:                 General Recommendations:  follow up diet tolerance.   Diet recommendations:  Dental Soft, Thin  Aspiration Precautions: Assistance with meals and Standard aspiration precautions   General Precautions: Standard,    Communication strategies:  none    History:     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    Hypertension     IBS (irritable bowel syndrome) 6/21/2017    IGT (impaired glucose tolerance) 1/12/2016    Iron deficiency anemia secondary to inadequate dietary iron intake 6/24/2013    Joint pain     Keratoconjunctivitis sicca of both eyes not specified as Sjogren's 7/29/2016    Leiomyoma of uterus, unspecified 9/16/2014    Long QT interval 6/29/2016    Due to medication (plaquenil)     Macular edema 1/12/2015    Multinodular goiter 1/12/2016    Neuropathy 8/23/2017    Plaquenil causing adverse effect in therapeutic use 10/7/2016    Pneumococcal vaccine refused 8/17/2016    Pneumonia due to Streptococcus pneumoniae 4/5/2018    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/2015    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    s/p Right Total knee replacement 5/15/2013    Scoliosis of thoracic spine 5/15/2013    Small vessel disease, cerebrovascular 12/28/2017    Stroke     Vascular dementia 12/6/2017       Past Surgical History:    Procedure Laterality Date    CATARACT EXTRACTION      COLONOSCOPY N/A 9/29/2015    Performed by FIDELINA Sanchez [facility] at [facility]O ([facility]TH F[facility])    EGD (ESOPHAGOGASTRODUODENOSCOPY) N/A 3/11/2014    Performed by Federico Escobar MD at [facility] ENDO (4TH FLR)    EGD (ESOPHAGOGASTRODUODENOSCOPY) N/A 11/5/2013    Performed by Federico Escobar MD at [facility] ENDO (4TH FLR)    ESOPHAGOGASTRODUODENOSCOPY (EGD) N/A 10/4/2017    Performed by Mg Morton [facility] at [facility] ENDO    EYE SURGERY      INJECTION-STEROID-EPIDURAL-TRANSFORAMINAL Right 9/20/2017    Per[facility]dez [facility] at [facility] PAIN MGT    JOINT REPLACEMENT      right knee    KNEE SURGERY Left 12/31/2013    TKR    left parotidectomy      mixed tumor    SALIVARY GLAND SURGERY      TONSILLECTOMY         Social History: Patient lives with along .    Prior Intubation HX:  N/A    Modified Barium Swallow: N/A    Prior diet: Regular / thin liquids at baseline.  Most recent ST note 12/18 recommended NPO 2/2 AMS.    Occupation/hobbies/homemaking: retired MD.     Subjective     Awake; confused.     Pain/Comfort:  · Pain Rating 1: 0/10    Objective:     Oral Musculature Evaluation  · Oral Musculature: unable to assess due to poor participation/comprehension  · Dentition: present and adequate  · Secretion Management: adequate  · Volitional Cough: unable to elicit  · Volitional Swallow: unable to elicit  · Voice Prior to PO Intake: unable to elicit    Bedside Swallow Eval:   Consistencies Assessed:  · Thin liquids via tsp cup and straw  · Solids via 1 cracker     Oral Phase:   · Prolonged mastication, mild    Pharyngeal Phase:   · no overt clinical signs/symptoms of aspiration    Compensatory Strategies  · None    Treatment: Skilled education was provided to patient regarding diet recs, aspiration precautions, and ongoing ST plan of care. Recommend reinforcement.     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     Goals:    Multidisciplinary Problems     SLP Goals        Problem: SLP Goal    Goal Priority Disciplines Outcome   SLP Goal     SLP Unable to achieve outcome(s) by discharge   Description:  Speech Language Pathology Goals  Goals expected to be met by 12/25    1. Pt will tolerate a dental soft diet and thin liquids with no overt signs of airway compromise.                       Plan:     · Patient to be seen:  3 x/week   · Plan of Care expires:  01/21/19  · Plan of Care reviewed with:  patient   · SLP Follow-Up:  Yes       Discharge recommendations:  other (see comments)   Barriers to Discharge:  None    Time Tracking:     SLP Treatment Date:   12/21/18  Speech Start Time:  1100  Speech Stop Time:  1110     Speech Total Time (min):  10 min    Billable Minutes: Eval Swallow and Oral Function 10    Emily Abadie, CCC-SLP  12/21/2018

## 2018-12-21 NOTE — SUBJECTIVE & OBJECTIVE
Subjective:     Interval History: Patient much more alert and responsive today. Opens eyes spontaneously, conversing w/ family and sitter at bedside. Able to follow commands and oriented to self, time and situation. According to the nursing staff, patient waxes and wanes and at times is AXOx4.    Current Neurological Medications:     Current Facility-Administered Medications   Medication Dose Route Frequency Provider Last Rate Last Dose    acetaminophen suppository 650 mg  650 mg Rectal Q6H PRN Kristen Correa MD   650 mg at 12/20/18 0828    acyclovir (ZOVIRAX) 520 mg in dextrose 5 % 100 mL IVPB  10 mg/kg (Ideal) Intravenous Q24H Rogelio Mijares MD        ceFEPIme injection 2 g  2 g Intravenous Q24H Dayan Martinez MD   2 g at 12/21/18 1225    dextrose 5 % and 0.45 % NaCl infusion   Intravenous Continuous Dayan Martinez  mL/hr at 12/21/18 1401      dextrose 50 % in water (D50W) injection 25 g  25 g Intravenous PRN Karen Toledo MD        dextrose 50% injection 12.5 g  12.5 g Intravenous PRN Karen Toledo MD        dextrose 50% injection 12.5 g  12.5 g Intravenous PRN MERARY Anguiano MD        fentaNYL (SUBLIMAZE) 50 mcg/mL injection             fentaNYL injection 25 mcg  25 mcg Intravenous Once Christine Cantrell MD        glucagon (human recombinant) injection 1 mg  1 mg Intramuscular PRN Karen Toledo MD        hydrALAZINE injection 5 mg  5 mg Intravenous Q4H PRN Rogelio Mijares MD   5 mg at 12/21/18 1429    lidocaine (PF) 10 mg/ml (1%) injection 100 mg  10 mL Other Once Christine Cantrell MD        methylPREDNISolone sodium succinate injection 20 mg  20 mg Intravenous Daily Rogelio Mijares MD   20 mg at 12/21/18 0917    midazolam (VERSED) 1 mg/mL injection 2 mg  2 mg Intravenous Once Christine Cantrell MD        midazolam (VERSED) 1 mg/mL injection             ondansetron injection 4 mg  4 mg Intravenous Q6H PRN Shelli Bernstein NP   4 mg at  "12/18/18 1845    sodium chloride 0.9% flush 5 mL  5 mL Intravenous PRN Karen Toledo MD        [START ON 12/22/2018] vancomycin in dextrose 5 % 1 gram/250 mL IVPB 1,000 mg  1,000 mg Intravenous Q24H Jody Landa MD           Review of Systems   Constitutional: Positive for activity change, fatigue and fever.   HENT: Negative.    Gastrointestinal: Negative for nausea and vomiting.   Endocrine: Negative.    Genitourinary: Negative.    Skin: Negative.    Allergic/Immunologic: Negative.    Neurological: Positive for weakness.   Psychiatric/Behavioral: Positive for agitation and confusion.     Objective:     Vital Signs (Most Recent):  Temp: 97.4 °F (36.3 °C) (12/21/18 0700)  Pulse: 110 (12/21/18 1400)  Resp: (!) 26 (12/21/18 1400)  BP: (!) 184/92 (12/21/18 1400)  SpO2: 99 % (12/21/18 1400) Vital Signs (24h Range):  Temp:  [97.4 °F (36.3 °C)-98.5 °F (36.9 °C)] 97.4 °F (36.3 °C)  Pulse:  [] 110  Resp:  [19-33] 26  SpO2:  [92 %-99 %] 99 %  BP: (116-184)/(56-92) 184/92     Weight: 72.4 kg (159 lb 9.8 oz)  Body mass index is 28.27 kg/m².    Physical Exam  General:  Well-developed, well-nourished, nad, some confusion   HEENT:  NCAT, PERRLA, oropharyngeal membranes non-erythematous/without exudate  Neck:  Supple, normal ROM without nuchal rigidity  Resp:  Symmetric expansion, no increased wob  CVS:  No LE edema, peripheral pulses 2+ (radial, dorsalis pedis)  GI:  Abd soft, non-distended, non-tender to palpation  Neurologic Exam:  Mental Status:  A&Ox2, some confusion and perseveration on "eventful morning" and how "so much stuff that happened".  Cranial Nerves:  PERRLA, EOMI, no deficits noted.   Motor:  Normal bulk and tone.  Moves all extremities w/ noxious stimuli although R>L.  Sensory: Noxious stimuli only, unable to assess light touch   Reflexes: R Biceps, brachioradialis, patellar, Achilles 1+.    L Biceps, brachioradialis 3+, patellar, Achilles 2+  Coordination: Does not follow directions " well.  Gait: Deferred           Significant Labs:   Hemoglobin A1c: No results for input(s): HGBA1C in the last 720 hours.  Blood Culture:   No results for input(s): LABBLOO in the last 48 hours.  BMP:   Recent Labs   Lab 12/20/18 0310 12/21/18  0412   * 164*    134*   K 3.9 3.5    103   CO2 22* 18*   BUN 30* 43*   CREATININE 1.6* 1.8*   CALCIUM 8.9 8.8   MG 1.7 2.2     CBC:   Recent Labs   Lab 12/20/18 0310 12/21/18  0412   WBC 15.88* 16.14*   HGB 14.9 15.3   HCT 46.7 48.7*   * 150     CMP:   Recent Labs   Lab 12/20/18 0310 12/21/18  0412   * 164*    134*   K 3.9 3.5    103   CO2 22* 18*   BUN 30* 43*   CREATININE 1.6* 1.8*   CALCIUM 8.9 8.8   MG 1.7 2.2   PROT 7.2 7.4   ALBUMIN 3.4* 3.3*   BILITOT 1.3* 0.9   ALKPHOS 45* 45*   AST 12 12   ALT 8* 8*   ANIONGAP 14 13   EGFRNONAA 30.0* 26.0*     CSF Culture: No results for input(s): CSFCULTURE in the last 48 hours.  CSF Studies: No results for input(s): ALIQUT, APPEARCSF, COLORCSF, CSFWBC, CSFRBC, GLUCCSF, LDHCSF, PROTEINCSF, VDRLCSF in the last 48 hours.  Inflammatory Markers:   No results for input(s): SEDRATE, CRP, PROCAL in the last 48 hours.  POCT Glucose:   Recent Labs   Lab 12/20/18  1812   POCTGLUCOSE 233*     Prealbumin: No results for input(s): PREALBUMIN in the last 48 hours.  Respiratory Culture: No results for input(s): GSRESP, RESPIRATORYC in the last 48 hours.  Urine Culture: No results for input(s): LABURIN in the last 48 hours.  Urine Studies:   Recent Labs   Lab 12/20/18  1132   COLORU Sissy   APPEARANCEUA Cloudy*   PHUR 5.0   SPECGRAV 1.015   PROTEINUA 2+*   GLUCUA Negative   KETONESU Trace*   BILIRUBINUA Negative   OCCULTUA Negative   NITRITE Negative   LEUKOCYTESUR 2+*   RBCUA 1   WBCUA 32*   BACTERIA Occasional   SQUAMEPITHEL 1   HYALINECASTS 0     All pertinent lab results from the past 24 hours have been reviewed.    Significant Imaging: I have reviewed all pertinent imaging results/findings  within the past 24 hours.   EEG  There is evidence for R hemispheric cortical dysfunction consistent with a structural lesion and moderate generalized encephalopathy possibly due to toxic/metabolic etiology or medication effect. No seizures were recorded during this study.

## 2018-12-21 NOTE — ASSESSMENT & PLAN NOTE
Initial presentation of blurry vision, consistent w/ HTN encephalopathy. Patient waxed and waned from baseline to more confusing during her stay in the observation unit. Was more somnolent on 12/18 and oral medication was stopped 2/2 aspiration risk and some difficulty w/ BP control w/ IV meds due to availability of medication as well as observation status. Had worsening mentation throughout the day and developed a fever ( Tmax 102.6), tachycardia, increased RR and persistently elevated BP.  She was transferred to MICU for increased level of care overnight on 12/19.  Significantly improved today. Able to hold a conversation, although remains confused. Follows commands, L side slightly weaker than the R. MRI w/o any evidence of acute intracranial pathology    - MRI- no intracranial acute pathology   - Agree with LP; unable to obtain on the floor despite multiple attempts 2/2 difficult bony anatomy in this patient w/ scoliosis ; scheduled for IR guided LP  - Would recommend continuous EEG placement after the above studies completed for evaluation of episodic witnessed LUE/LLE rhythmic jerking to r/o seizure   - EEG w/ previous R hemispheric slowing   - Antibiotics broadened as well as Acyclovir added for viral coverage.Patient is immunocompromised 2/2 RA medication ( steroids, DMARD)   - Noted to have a dirty UA on 12/20  - CXR w/ persistent patchy consolidation, although appears more chronic in nature.   - B1 45, low end of normal, can consider supplementation, although not the etiology of patient's current condition   - Will continue to follow patient.

## 2018-12-21 NOTE — PROGRESS NOTES
[facility]  Critical Care Medicine  Progress Note    Patient Name: Selma Alonzo Lux MD  MRN: [id number]  Admission Date: 12/16/2018  Hospital Length of Stay: 2 days  Code Status: Full Code  Attending Provider: Rogelio Mijares MD  Primary Care Provider: Bhargav Hirsch MD   Principal Problem: Acute encephalopathy    Subjective:     HPI:  Ms. Hahn Alonzo Lux MD is a 83 y.o. female with rheumatoid arthritis on steroids, Plaquenil and Sulfasalazine, history of stroke on Aspirin/Plavix, and vascular dementia who presents to the emergency department 12/16/18 with her daughter because of confusion.  History is obtained from the daughter, as the patient is not able to provide a good history.  The daughter mentions that at baseline, the patient is very independent and is very sharp.  However, the day prior to admission the patient was endorsing some blurred vision, having difficulty eating saying that she could not see her plate, and was having unusual behavior-like sitting on her bedroom floor, claiming that someone moved her bed causing her to fall.  She has been having tangential speech requiring frequent redirection.  Additionally, she was complaining of some right-sided leg pain, which is unusual, as she normally complaints of left-sided leg pain.  The patient denies any numbness or tingling in any of her extremities. Of note, the patient ran out of her prednisone 2 days prior to admission.  She has been taking 25 mg daily.     The in the emergency department, she was found to have a blood pressure of 220 on arrival.  Daughter reports that her baseline blood pressure runs low, 110-130. Reports that pt is complaint with her medications.   She received 2 doses of Labetalol 20mg IV x 2 with improvement in her blood pressure to the 180s.  Head CT and MRI in the ED were negative for any acute process.             Hospital/ICU Course:  -12/19/2018 ICU was consulted for altered mental status  and obtundation. Pt's daughter said that since pt admitted to the hospital, she was confused but able to talk. Started in the last 24 hs, pt became more lethargic and obtunded. Pt was evaluated by neurology during this admission and they think her clinical picture is due to hypertensive emergency and usually clinical improvement lag after BP improvement. Also, pt's daughter that pt had some cough when she was drinking juice.  Pt was brought to the ICU for airway watch.  Started empirically with acyclovir, vancomycin and cefepime, pt mental status wax and wean and her blood pressure been labile. All septic work up been negative  -12/20 plan to do LP. cret worsened to 1.6, urine lytes sent.  -12/21 - Called IR to obtain LP.Anesthesia has already tried, and IR will now proceed with procedure. SLP/PT/OT ordered.     Interval History/Significant Events: Overnight patient was disoriented and nonverbal. Around 5 AM patient because more alert and was able to answer questions (with delayed responses). Patient did not have a bowel movement (NPO). SLP has saw her and recommended dental soft diet. Awaiting PT/OT evaluation    -Anesthesia and myself have tried the LP two more times however, we were unsuccessful due to the patient's difficult lumbar anatomy. IR has been re-consulted and they will attempt the LP.      Review of Systems   Unable to perform ROS: Mental status change     Objective:     Vital Signs (Most Recent):  Temp: 97.4 °F (36.3 °C) (12/21/18 0700)  Pulse: (!) 111 (12/21/18 0800)  Resp: 19 (12/21/18 0800)  BP: (!) 144/67 (12/21/18 0800)  SpO2: 97 % (12/21/18 0800) Vital Signs (24h Range):  Temp:  [97.4 °F (36.3 °C)-98.5 °F (36.9 °C)] 97.4 °F (36.3 °C)  Pulse:  [] 111  Resp:  [16-32] 19  SpO2:  [95 %-99 %] 97 %  BP: (104-180)/(55-88) 144/67   Weight: 72.4 kg (159 lb 9.8 oz)  Body mass index is 28.27 kg/m².      Intake/Output Summary (Last 24 hours) at 12/21/2018 0837  Last data filed at 12/21/2018  0800  Gross per 24 hour   Intake 1696.67 ml   Output 685 ml   Net 1011.67 ml       Physical Exam   Constitutional: She is oriented to person, place, and time. She appears well-developed and well-nourished.   HENT:   Head: Normocephalic and atraumatic.   Eyes: Lids are normal. Pupils are equal, round, and reactive to light. Right conjunctiva is injected. Left conjunctiva is injected.   Cardiovascular: S1 normal, S2 normal and normal heart sounds. Tachycardia present. Exam reveals no gallop and no friction rub.   No murmur heard.  Pulmonary/Chest: Breath sounds normal. No stridor. No respiratory distress. She has no decreased breath sounds.   Abdominal: Bowel sounds are normal. She exhibits distension.   Genitourinary:   Genitourinary Comments: Patient has parikh present     Neurological: She is alert and oriented to person, place, and time. She displays normal reflexes. She exhibits normal muscle tone.   Delayed responses       Vents:     Lines/Drains/Airways     Drain                 Urethral Catheter 12/19/18 1955 Straight-tip 1 day          Peripheral Intravenous Line                 Peripheral IV - Single Lumen 12/16/18 1342 Right Forearm 4 days         Peripheral IV - Single Lumen 12/19/18 0300 Left Antecubital 2 days              Significant Labs:    CBC/Anemia Profile:  Recent Labs   Lab 12/19/18  0950 12/20/18  0310 12/21/18  0412   WBC  --  15.88* 16.14*   HGB  --  14.9 15.3   HCT  --  46.7 48.7*   PLT  --  132* 150   MCV  --  85 88   RDW  --  14.8* 14.7*   FOLATE 39.5*  --   --    [id number] 632  --   --         Chemistries:  Recent Labs   Lab 12/20/18  0310 12/21/18  0412    134*   K 3.9 3.5    103   CO2 22* 18*   BUN 30* 43*   CREATININE 1.6* 1.8*   CALCIUM 8.9 8.8   ALBUMIN 3.4* 3.3*   PROT 7.2 7.4   BILITOT 1.3* 0.9   ALKPHOS 45* 45*   ALT 8* 8*   AST 12 12   MG 1.7 2.2   PHOS 3.6 3.2       A1C: No results for input(s): HGBA1C in the last 48 hours.  Blood Culture: No results for input(s):  LABBLOO in the last 48 hours.  BMP:   Recent Labs   Lab 12/21/18  0412   *   *   K 3.5      CO2 18*   BUN 43*   CREATININE 1.8*   CALCIUM 8.8   MG 2.2     CMP:   Recent Labs   Lab 12/20/18  0310 12/21/18  0412    134*   K 3.9 3.5    103   CO2 22* 18*   * 164*   BUN 30* 43*   CREATININE 1.6* 1.8*   CALCIUM 8.9 8.8   PROT 7.2 7.4   ALBUMIN 3.4* 3.3*   BILITOT 1.3* 0.9   ALKPHOS 45* 45*   AST 12 12   ALT 8* 8*   ANIONGAP 14 13   EGFRNONAA 30.0* 26.0*       Significant Imaging:  I have reviewed and interpreted all pertinent imaging results/findings within the past 24 hours.      ABG  Recent Labs   Lab 12/19/18  0317   PH 7.448   PO2 42   PCO2 34.1*   HCO3 23.6*   BE 0     Assessment/Plan:     Neuro   * Acute encephalopathy    82 y/o F with RA (chronically on steroids, plaquenil and sulfasalazine), history of multiple TIA's (on aspirin and plavix),  And vascular dementia presents to the ED on 12/17/18 with a change in her mentation status with blurry vision (baseline able to perform all ADLS exceptionally) as well as right sided leg pain. The patient denies any numbness or tingling in any of her extremities. Of note, the patient ran out of her prednisone 2 days prior to admission. She has been taking 25 mg daily. On arrival to the ED patient had SBP in the 220 (BL -130). She received 2 doses of Labetalol 20mg IV x 2 with improvement in her blood pressure to the 180s.  Head CT and MRI in the ED were negative for any acute process. EEG 12/19 showed toxic medical encephalopathy without epileptic waves. Repeat MRI does not show any intracranial processes.    Plan  1) IR to obtain LP as we had three failed attempts on the floor.    2) Encephalopathy most likely hypertensive as patient had blurry vision as well as hypertension. CT and Mri negative for stroke. BP control, pt unable to take orally. On IV hydralazine prn for  SBP>180   3) Another cause of encephalopathy can be due to an  infectious process. Patient empirically started on vancomycin, cefepime, and acyclovir. WBC on 12/21 is 16.14.   4) Will obtain EEG again as per neuro recommendations        Altered mental status    -- see acute encephalopathy.     Hypertensive encephalopathy    -- see acute encephalopathy.     Dystonia    · Follows with Dr. Giang of Neurology  · Hold Gabapentin and Baclofen  for now given AMS     History of stroke    · Chronic and stable  · Hold Aspirin 81mg PO daily  · Hold Plavix 75mg PO daily >> will hold due to pt is not able to take PO and for possible LP.           Vascular dementia    · Chronic issue  · Very sharp and independent at baseline per family           Psychiatric   Delirium superimposed on dementia    -- see acute encephalopathy.     Cardiac/Vascular   Hypertension, essential    · Reports confusion and blurred vision x 2 days, with  on arrival - reports baseline BP to be 110-130s  · S/p Labetalol 20mg IV x 2 doses with improvement in BP to 180s  · Head CT and MRI negative for acute process. Repeat MRI negative which shows no acute intracranial abnormality.   · Takes Coreg 12.5mg PO BID at home.Holding coreg until she is able to swallow. Will increase to 25 mg   · Since pt is not safe to take orally at this time, Continue hydralazine 5 mg IV prn for target SBP <180       Renal/   Acute renal failure superimposed on stage 3 chronic kidney disease    -- baseline Creatine 1.1.  12/20 FC was inserted due to retention. There is an upward trend of creatinine   -- Urine lytes ordered- urine sodium <20, creatinine 174  -- strict in and out.  -- avoid nephrotoxic medication. Properly renally dosed antibiotics.      Hematology   Thrombocytopenia    · Plt chronically low  · Monitor for bleeding     Orthopedic   Seropositive rheumatoid arthritis of multiple sites    Dx 2012 with Dr No, rafal Qureshi  HCQ since 2012  Humira one dose April 2018 followed by ICU admission for pnemonia and resp  failure     Other   Long-term use of immunosuppressant medication    Long Term Use of Systolic Steroids  · Follows with Dr. Rouse of Rheumatology  · On Prednisone 25mg PO daily >> switched to methylpred IV 20 mg daily.  · Hold Sulfasalazine 1g PO BID  · Hold Plaquenil 400mg PO daily  · Hold Bactrim for ppx            Critical Care Daily Checklist:    A: Awake: RASS Goal/Actual Goal:    Actual: Monzon Agitation Sedation Scale (RASS): Alert and calm   B: Spontaneous Breathing Trial Performed?  Patient extubated yesterday   C: SAT & SBT Coordinated?  N/A    D: Delirium: CAM-ICU Overall CAM-ICU: Positive   E: Early Mobility Performed? Yes   F: Feeding Goal: Goals: Meet % EEN, EPN  Status: Nutrition Goal Status: new   Current Diet Order   Procedures    Diet NPO      AS: Analgesia/Sedation None    T: Thromboembolic Prophylaxis SCD   H: HOB > 300 Yes   U: Stress Ulcer Prophylaxis (if needed) None   G: Glucose Control None   B: Bowel Function     I: Indwelling Catheter (Lines & Parikh) Necessity Patient has parikh    D: De-escalation of Antimicrobials/Pharmacotherapies None     Plan for the day/ETD LP by IR     Code Status:  Family/Goals of Care: Full Code         Critical secondary to Patient has a condition that poses threat to life and bodily function: Acute encephalopathy     Critical care was time spent personally by me on the following activities: development of treatment plan with patient or surrogate and bedside caregivers, discussions with consultants, evaluation of patient's response to treatment, examination of patient, ordering and performing treatments and interventions, ordering and review of laboratory studies, ordering and review of radiographic studies, pulse oximetry, re-evaluation of patient's condition. This critical care time did not overlap with that of any other provider or involve time for any procedures.     Christine Cantrell MD  Critical Care Medicine  [facility]

## 2018-12-22 LAB
ALBUMIN SERPL BCP-MCNC: 3.2 G/DL
ALLENS TEST: ABNORMAL
ALP SERPL-CCNC: 40 U/L
ALT SERPL W/O P-5'-P-CCNC: 9 U/L
ANION GAP SERPL CALC-SCNC: 14 MMOL/L
ANISOCYTOSIS BLD QL SMEAR: SLIGHT
AST SERPL-CCNC: 20 U/L
BACTERIA UR CULT: NO GROWTH
BASOPHILS # BLD AUTO: 0.02 K/UL
BASOPHILS NFR BLD: 0.1 %
BILIRUB SERPL-MCNC: 1.2 MG/DL
BUN SERPL-MCNC: 42 MG/DL
BURR CELLS BLD QL SMEAR: ABNORMAL
CALCIUM SERPL-MCNC: 9.1 MG/DL
CHLORIDE SERPL-SCNC: 108 MMOL/L
CO2 SERPL-SCNC: 19 MMOL/L
CREAT SERPL-MCNC: 1.5 MG/DL
DIFFERENTIAL METHOD: ABNORMAL
EOSINOPHIL # BLD AUTO: 0.1 K/UL
EOSINOPHIL NFR BLD: 0.7 %
ERYTHROCYTE [DISTWIDTH] IN BLOOD BY AUTOMATED COUNT: 14.8 %
EST. GFR  (AFRICAN AMERICAN): 37.4 ML/MIN/1.73 M^2
EST. GFR  (NON AFRICAN AMERICAN): 32.4 ML/MIN/1.73 M^2
GLUCOSE SERPL-MCNC: 120 MG/DL
HCO3 UR-SCNC: 20.7 MMOL/L (ref 24–28)
HCT VFR BLD AUTO: 43.8 %
HGB BLD-MCNC: 14 G/DL
IMM GRANULOCYTES # BLD AUTO: 0.08 K/UL
IMM GRANULOCYTES NFR BLD AUTO: 0.5 %
LYMPHOCYTES # BLD AUTO: 1.8 K/UL
LYMPHOCYTES NFR BLD: 12 %
MAGNESIUM SERPL-MCNC: 2.5 MG/DL
MCH RBC QN AUTO: 27.5 PG
MCHC RBC AUTO-ENTMCNC: 32 G/DL
MCV RBC AUTO: 86 FL
MONOCYTES # BLD AUTO: 3.3 K/UL
MONOCYTES NFR BLD: 22.6 %
NEUTROPHILS # BLD AUTO: 9.4 K/UL
NEUTROPHILS NFR BLD: 64.1 %
NRBC BLD-RTO: 0 /100 WBC
OVALOCYTES BLD QL SMEAR: ABNORMAL
PCO2 BLDA: 31.8 MMHG (ref 35–45)
PH SMN: 7.42 [PH] (ref 7.35–7.45)
PHOSPHATE SERPL-MCNC: 2.7 MG/DL
PLATELET # BLD AUTO: 150 K/UL
PLATELET BLD QL SMEAR: ABNORMAL
PMV BLD AUTO: 11 FL
PO2 BLDA: 80 MMHG (ref 80–100)
POC BE: -4 MMOL/L
POC SATURATED O2: 96 % (ref 95–100)
POC TCO2: 22 MMOL/L (ref 23–27)
POCT GLUCOSE: 178 MG/DL (ref 70–110)
POIKILOCYTOSIS BLD QL SMEAR: SLIGHT
POTASSIUM SERPL-SCNC: 3.6 MMOL/L
PROT SERPL-MCNC: 6.9 G/DL
RBC # BLD AUTO: 5.09 M/UL
SAMPLE: ABNORMAL
SCHISTOCYTES BLD QL SMEAR: ABNORMAL
SITE: ABNORMAL
SODIUM SERPL-SCNC: 141 MMOL/L
WBC # BLD AUTO: 14.64 K/UL

## 2018-12-22 PROCEDURE — 63600175 PHARM REV CODE 636 W HCPCS: Performed by: STUDENT IN AN ORGANIZED HEALTH CARE EDUCATION/TRAINING PROGRAM

## 2018-12-22 PROCEDURE — 99900035 HC TECH TIME PER 15 MIN (STAT)

## 2018-12-22 PROCEDURE — 25000003 PHARM REV CODE 250: Performed by: INTERNAL MEDICINE

## 2018-12-22 PROCEDURE — 82803 BLOOD GASES ANY COMBINATION: CPT

## 2018-12-22 PROCEDURE — 94761 N-INVAS EAR/PLS OXIMETRY MLT: CPT

## 2018-12-22 PROCEDURE — 85025 COMPLETE CBC W/AUTO DIFF WBC: CPT

## 2018-12-22 PROCEDURE — 95951 PR EEG MONITORING/VIDEORECORD: ICD-10-PCS | Mod: 26,,, | Performed by: PSYCHIATRY & NEUROLOGY

## 2018-12-22 PROCEDURE — 63600175 PHARM REV CODE 636 W HCPCS: Performed by: INTERNAL MEDICINE

## 2018-12-22 PROCEDURE — 83735 ASSAY OF MAGNESIUM: CPT

## 2018-12-22 PROCEDURE — 62270 DX LMBR SPI PNXR: CPT

## 2018-12-22 PROCEDURE — 80053 COMPREHEN METABOLIC PANEL: CPT

## 2018-12-22 PROCEDURE — 99291 PR CRITICAL CARE, E/M 30-74 MINUTES: ICD-10-PCS | Mod: GC,,, | Performed by: INTERNAL MEDICINE

## 2018-12-22 PROCEDURE — C1751 CATH, INF, PER/CENT/MIDLINE: HCPCS

## 2018-12-22 PROCEDURE — 36600 WITHDRAWAL OF ARTERIAL BLOOD: CPT

## 2018-12-22 PROCEDURE — 25000003 PHARM REV CODE 250: Performed by: STUDENT IN AN ORGANIZED HEALTH CARE EDUCATION/TRAINING PROGRAM

## 2018-12-22 PROCEDURE — 84100 ASSAY OF PHOSPHORUS: CPT

## 2018-12-22 PROCEDURE — 20000000 HC ICU ROOM

## 2018-12-22 PROCEDURE — 95951 PR EEG MONITORING/VIDEORECORD: CPT | Mod: 26,,, | Performed by: PSYCHIATRY & NEUROLOGY

## 2018-12-22 PROCEDURE — 99291 CRITICAL CARE FIRST HOUR: CPT | Mod: GC,,, | Performed by: INTERNAL MEDICINE

## 2018-12-22 PROCEDURE — 95951 HC EEG MONITORING/VIDEO RECORD: CPT

## 2018-12-22 RX ORDER — CARVEDILOL 12.5 MG/1
12.5 TABLET ORAL 2 TIMES DAILY WITH MEALS
Status: DISCONTINUED | OUTPATIENT
Start: 2018-12-22 | End: 2018-12-22

## 2018-12-22 RX ADMIN — ACYCLOVIR SODIUM 520 MG: 1000 INJECTION, SOLUTION INTRAVENOUS at 01:12

## 2018-12-22 RX ADMIN — METHYLPREDNISOLONE SODIUM SUCCINATE 20 MG: 40 INJECTION, POWDER, FOR SOLUTION INTRAMUSCULAR; INTRAVENOUS at 02:12

## 2018-12-22 RX ADMIN — CEFEPIME 2 G: 2 INJECTION, POWDER, FOR SOLUTION INTRAVENOUS at 02:12

## 2018-12-22 RX ADMIN — ACYCLOVIR SODIUM 520 MG: 1000 INJECTION, SOLUTION INTRAVENOUS at 10:12

## 2018-12-22 RX ADMIN — VANCOMYCIN HYDROCHLORIDE 1000 MG: 1 INJECTION, POWDER, LYOPHILIZED, FOR SOLUTION INTRAVENOUS at 06:12

## 2018-12-22 RX ADMIN — HYDRALAZINE HYDROCHLORIDE 5 MG: 20 INJECTION INTRAMUSCULAR; INTRAVENOUS at 05:12

## 2018-12-22 NOTE — PLAN OF CARE
Patient's chart reviewed.  No new studies to follow up particularly LP and EEG.  Will continue to follow peripherally as we await these studies.   Contact w/ any additional questions or concerns in the meatime.    Nereida English MD    General Neurology  [facility]

## 2018-12-22 NOTE — SUBJECTIVE & OBJECTIVE
Interval History/Significant Events: Yesterday patient could not get LP through IR. Most likely schedule for midline on Monday as patient has poor peripheral access. 24 hour EEG reordered again. Fluids stopped as there were rales in the lung examination.       Review of Systems   Unable to perform ROS: Mental status change     Objective:     Vital Signs (Most Recent):  Temp: 97.4 °F (36.3 °C) (12/22/18 1500)  Pulse: (!) 123 (12/22/18 1554)  Resp: (!) 24 (12/22/18 1554)  BP: (!) 163/89 (12/22/18 1530)  SpO2: 97 % (12/22/18 1554) Vital Signs (24h Range):  Temp:  [97.4 °F (36.3 °C)-98.2 °F (36.8 °C)] 97.4 °F (36.3 °C)  Pulse:  [114-128] 123  Resp:  [20-33] 24  SpO2:  [95 %-98 %] 97 %  BP: (131-189)/() 163/89   Weight: 72.4 kg (159 lb 9.8 oz)  Body mass index is 28.27 kg/m².      Intake/Output Summary (Last 24 hours) at 12/22/2018 1641  Last data filed at 12/22/2018 1500  Gross per 24 hour   Intake 650 ml   Output 790 ml   Net -140 ml       Physical Exam   Constitutional: She is oriented to person, place, and time. No distress.   HENT:   Head: Normocephalic and atraumatic.   Eyes: Pupils are equal, round, and reactive to light. Right eye exhibits no discharge. Left eye exhibits no discharge.   Pulmonary/Chest: She has rales.   Abdominal: Soft. She exhibits distension. She exhibits no mass. There is no tenderness. There is no guarding.   Neurological: She is alert and oriented to person, place, and time. She displays normal reflexes. She exhibits abnormal muscle tone. Coordination normal.   Delayed response time however, patient AOX3   Skin: Skin is warm and dry. She is not diaphoretic.       Vents:     Lines/Drains/Airways     Drain                 Urethral Catheter 12/19/18 1955 Straight-tip 2 days          Peripheral Intravenous Line                 Peripheral IV - Single Lumen 12/22/18 0045 Left;Posterior Hand less than 1 day              Significant Labs:    CBC/Anemia Profile:  Recent Labs   Lab  12/21/18 0412 12/22/18  0439   WBC 16.14* 14.64*   HGB 15.3 14.0   HCT 48.7* 43.8    150   MCV 88 86   RDW 14.7* 14.8*        Chemistries:  Recent Labs   Lab 12/21/18 0412 12/22/18  0439   * 141   K 3.5 3.6    108   CO2 18* 19*   BUN 43* 42*   CREATININE 1.8* 1.5*   CALCIUM 8.8 9.1   ALBUMIN 3.3* 3.2*   PROT 7.4 6.9   BILITOT 0.9 1.2*   ALKPHOS 45* 40*   ALT 8* 9*   AST 12 20   MG 2.2 2.5   PHOS 3.2 2.7       A1C: No results for input(s): HGBA1C in the last 48 hours.  ABGs:   Recent Labs   Lab 12/22/18  1554   PH 7.421   PCO2 31.8*   HCO3 20.7*   POCSATURATED 96   BE -4     BMP:   Recent Labs   Lab 12/22/18 0439   *      K 3.6      CO2 19*   BUN 42*   CREATININE 1.5*   CALCIUM 9.1   MG 2.5     CMP:   Recent Labs   Lab 12/21/18 0412 12/22/18  0439   * 141   K 3.5 3.6    108   CO2 18* 19*   * 120*   BUN 43* 42*   CREATININE 1.8* 1.5*   CALCIUM 8.8 9.1   PROT 7.4 6.9   ALBUMIN 3.3* 3.2*   BILITOT 0.9 1.2*   ALKPHOS 45* 40*   AST 12 20   ALT 8* 9*   ANIONGAP 13 14   EGFRNONAA 26.0* 32.4*       Significant Imaging:  I have reviewed all pertinent imaging results/findings within the past 24 hours.

## 2018-12-22 NOTE — PROGRESS NOTES
[facility]  Critical Care Medicine  Progress Note    Patient Name: Selma Alonzo Lux MD  MRN: [id number]  Admission Date: 12/16/2018  Hospital Length of Stay: 3 days  Code Status: Full Code  Attending Provider: Rogelio Mijares MD  Primary Care Provider: Bhargav Hirsch MD   Principal Problem: Acute encephalopathy    Subjective:     HPI:  Ms. Hahn Alonzo Lux MD is a 83 y.o. female with rheumatoid arthritis on steroids, Plaquenil and Sulfasalazine, history of stroke on Aspirin/Plavix, and vascular dementia who presents to the emergency department 12/16/18 with her daughter because of confusion.  History is obtained from the daughter, as the patient is not able to provide a good history.  The daughter mentions that at baseline, the patient is very independent and is very sharp.  However, the day prior to admission the patient was endorsing some blurred vision, having difficulty eating saying that she could not see her plate, and was having unusual behavior-like sitting on her bedroom floor, claiming that someone moved her bed causing her to fall.  She has been having tangential speech requiring frequent redirection.  Additionally, she was complaining of some right-sided leg pain, which is unusual, as she normally complaints of left-sided leg pain.  The patient denies any numbness or tingling in any of her extremities. Of note, the patient ran out of her prednisone 2 days prior to admission.  She has been taking 25 mg daily.     The in the emergency department, she was found to have a blood pressure of 220 on arrival.  Daughter reports that her baseline blood pressure runs low, 110-130. Reports that pt is complaint with her medications.   She received 2 doses of Labetalol 20mg IV x 2 with improvement in her blood pressure to the 180s.  Head CT and MRI in the ED were negative for any acute process.           Hospital/ICU Course:  -12/19/2018 ICU was consulted for altered mental status  and obtundation. Pt's daughter said that since pt admitted to the hospital, she was confused but able to talk. Started in the last 24 hs, pt became more lethargic and obtunded. Pt was evaluated by neurology during this admission and they think her clinical picture is due to hypertensive emergency and usually clinical improvement lag after BP improvement. Also, pt's daughter that pt had some cough when she was drinking juice.  Pt was brought to the ICU for airway watch.  Started empirically with acyclovir, vancomycin and cefepime, pt mental status wax and wean and her blood pressure been labile. All septic work up been negative  -12/20 plan to do LP. cret worsened to 1.6, urine lytes sent.  -12/21 - Called IR to obtain LP.Anesthesia has already tried, and IR will now proceed with procedure. SLP/PT/OT ordered.   -12/22- IR could not get LP yesterday. Patient getting 24 EEG done today. MRI was reexamined with radiology which was not concerning for PRES or temporal enhancement.      Interval History/Significant Events: Yesterday patient could not get LP through IR. Most likely schedule for midline on Monday as patient has poor peripheral access. 24 hour EEG reordered again. Fluids stopped as there were rales in the lung examination.       Review of Systems   Unable to perform ROS: Mental status change     Objective:     Vital Signs (Most Recent):  Temp: 97.4 °F (36.3 °C) (12/22/18 1500)  Pulse: (!) 123 (12/22/18 1554)  Resp: (!) 24 (12/22/18 1554)  BP: (!) 163/89 (12/22/18 1530)  SpO2: 97 % (12/22/18 1554) Vital Signs (24h Range):  Temp:  [97.4 °F (36.3 °C)-98.2 °F (36.8 °C)] 97.4 °F (36.3 °C)  Pulse:  [114-128] 123  Resp:  [20-33] 24  SpO2:  [95 %-98 %] 97 %  BP: (131-189)/() 163/89   Weight: 72.4 kg (159 lb 9.8 oz)  Body mass index is 28.27 kg/m².      Intake/Output Summary (Last 24 hours) at 12/22/2018 1641  Last data filed at 12/22/2018 1500  Gross per 24 hour   Intake 650 ml   Output 790 ml   Net -140 ml        Physical Exam   Constitutional: She is oriented to person, place, and time. No distress.   HENT:   Head: Normocephalic and atraumatic.   Eyes: Pupils are equal, round, and reactive to light. Right eye exhibits no discharge. Left eye exhibits no discharge.   Pulmonary/Chest: She has rales.   Abdominal: Soft. She exhibits distension. She exhibits no mass. There is no tenderness. There is no guarding.   Neurological: She is alert and oriented to person, place, and time. She displays normal reflexes. She exhibits abnormal muscle tone. Coordination normal.   Delayed response time however, patient AOX3   Skin: Skin is warm and dry. She is not diaphoretic.       Vents:     Lines/Drains/Airways     Drain                 Urethral Catheter 12/19/18 1955 Straight-tip 2 days          Peripheral Intravenous Line                 Peripheral IV - Single Lumen 12/22/18 0045 Left;Posterior Hand less than 1 day              Significant Labs:    CBC/Anemia Profile:  Recent Labs   Lab 12/21/18 0412 12/22/18 0439   WBC 16.14* 14.64*   HGB 15.3 14.0   HCT 48.7* 43.8    150   MCV 88 86   RDW 14.7* 14.8*        Chemistries:  Recent Labs   Lab 12/21/18 0412 12/22/18 0439   * 141   K 3.5 3.6    108   CO2 18* 19*   BUN 43* 42*   CREATININE 1.8* 1.5*   CALCIUM 8.8 9.1   ALBUMIN 3.3* 3.2*   PROT 7.4 6.9   BILITOT 0.9 1.2*   ALKPHOS 45* 40*   ALT 8* 9*   AST 12 20   MG 2.2 2.5   PHOS 3.2 2.7       A1C: No results for input(s): HGBA1C in the last 48 hours.  ABGs:   Recent Labs   Lab 12/22/18  1554   PH 7.421   PCO2 31.8*   HCO3 20.7*   POCSATURATED 96   BE -4     BMP:   Recent Labs   Lab 12/22/18 0439   *      K 3.6      CO2 19*   BUN 42*   CREATININE 1.5*   CALCIUM 9.1   MG 2.5     CMP:   Recent Labs   Lab 12/21/18 0412 12/22/18 0439   * 141   K 3.5 3.6    108   CO2 18* 19*   * 120*   BUN 43* 42*   CREATININE 1.8* 1.5*   CALCIUM 8.8 9.1   PROT 7.4 6.9   ALBUMIN 3.3* 3.2*    BILITOT 0.9 1.2*   ALKPHOS 45* 40*   AST 12 20   ALT 8* 9*   ANIONGAP 13 14   EGFRNONAA 26.0* 32.4*       Significant Imaging:  I have reviewed all pertinent imaging results/findings within the past 24 hours.      ABG  Recent Labs   Lab 12/22/18  1554   PH 7.421   PO2 80   PCO2 31.8*   HCO3 20.7*   BE -4     Assessment/Plan:     Neuro   * Acute encephalopathy    82 y/o F with RA (chronically on steroids, plaquenil and sulfasalazine), history of multiple TIA's (on aspirin and plavix),  And vascular dementia presents to the ED on 12/17/18 with a change in her mentation status with blurry vision (baseline able to perform all ADLS exceptionally) as well as right sided leg pain. The patient denies any numbness or tingling in any of her extremities. Of note, the patient ran out of her prednisone 2 days prior to admission. She has been taking 25 mg daily. On arrival to the ED patient had SBP in the 220 (BL -130). She received 2 doses of Labetalol 20mg IV x 2 with improvement in her blood pressure to the 180s.  Head CT and MRI in the ED were negative for any acute process. EEG 12/19 showed toxic medical encephalopathy without epileptic waves. Repeat MRI does not show any intracranial processes.    Plan  1) IR to obtain LP as we had three failed attempts on the floor. On 12/21 patient did not obtain LP on patient as she lost peripheral access when she went downstairs. Patient mental status is waxing and waning.   2) Encephalopathy most likely hypertensive as patient had blurry vision as well as hypertension. CT and MRI negative for stroke. BP control, pt unable to take orally. On IV hydralazine prn for  SBP>180. Reviewed images with radiology again and images do not indicate PRES or temporal lobe enhancement.   3) Another cause of encephalopathy can be due to an infectious process. Patient empirically started on vancomycin, cefepime, and acyclovir. WBC on 12/22 is 14.64.   4) Will obtain 24 hr EEG again as per neuro  recommendations.        Altered mental status    -- see acute encephalopathy.     Hypertensive encephalopathy    -- see acute encephalopathy.     Dystonia    · Follows with Dr. Giang of Neurology  · Hold Gabapentin and Baclofen  for now given AMS     History of stroke    · Chronic and stable  · Hold Aspirin 81mg PO daily  · Hold Plavix 75mg PO daily >> will hold due to pt is not able to take PO and for possible LP.           Vascular dementia    · Chronic issue  · Very sharp and independent at baseline per family           Psychiatric   Delirium superimposed on dementia    -- see acute encephalopathy.     Cardiac/Vascular   Hypertension, essential    · Reports confusion and blurred vision x 2 days, with  on arrival - reports baseline BP to be 110-130s  · S/p Labetalol 20mg IV x 2 doses with improvement in BP to 180s  · Head CT and MRI negative for acute process. Repeat MRI negative which shows no acute intracranial abnormality.   · Takes Coreg 12.5mg PO BID at home.Holding coreg until she is able to swallow.  · Since pt is not safe to take orally at this time, Continue hydralazine 5 mg IV prn for target SBP <180       Renal/   Acute renal failure superimposed on stage 3 chronic kidney disease    -- baseline Creatine 1.1.  12/20 FC was inserted due to retention. There is an upward trend of creatinine  -- Urine lytes ordered- urine sodium <20, creatinine 174  -- strict in and out.  -- avoid nephrotoxic medication. Properly renally dosed antibiotics. If patient's creatinine keeps increasing we will discontinue the acyclovir      Hematology   Thrombocytopenia    · Plt within normal range   · Monitor for bleeding     Orthopedic   Seropositive rheumatoid arthritis of multiple sites    Dx 2012 with Dr No, rafal Qureshi  HCQ since 2012  Humira one dose April 2018 followed by ICU admission for pnemonia and resp failure     Other   Long-term use of immunosuppressant medication    Long Term Use of Systolic  Steroids  · Follows with Dr. Rouse of Rheumatology  · On Prednisone 25mg PO daily >> switched to methylpred IV 20 mg daily.  · Hold Sulfasalazine 1g PO BID  · Hold Plaquenil 400mg PO daily  · Hold Bactrim for ppx            Critical Care Daily Checklist:    A: Awake: RASS Goal/Actual Goal:    Actual: Monzon Agitation Sedation Scale (RASS): Alert and calm   B: Spontaneous Breathing Trial Performed?     C: SAT & SBT Coordinated?  N/A                      D: Delirium: CAM-ICU Overall CAM-ICU: Positive   E: Early Mobility Performed? No   F: Feeding Goal: Goals: Meet % EEN, EPN  Status: Nutrition Goal Status: new   Current Diet Order   Procedures    Diet Dysphagia Soft (IDDSI Level 6) [facility]; Thin     Order Specific Question:   Indicate patient location for additional diet options:     Answer:   [facility]     Order Specific Question:   Fluid consistency:     Answer:   Thin      AS: Analgesia/Sedation None   T: Thromboembolic Prophylaxis SCD   H: HOB > 300 No   U: Stress Ulcer Prophylaxis (if needed) None   G: Glucose Control None   B: Bowel Function     I: Indwelling Catheter (Lines & Hill) Necessity Hill inserted. Strict matching I/O    D: De-escalation of Antimicrobials/Pharmacotherapies None    Plan for the day/ETD NG tube placement possibly tomorrow     Code Status:  Family/Goals of Care: Full Code       Critical secondary to Patient has an abrupt change in neurologic status: acute encephalopathy      Critical care was time spent personally by me on the following activities: development of treatment plan with patient or surrogate and bedside caregivers, discussions with consultants, evaluation of patient's response to treatment, examination of patient, ordering and performing treatments and interventions, ordering and review of laboratory studies, ordering and review of radiographic studies, pulse oximetry, re-evaluation of patient's condition. This critical care time did not overlap with  that of any other provider or involve time for any procedures.     Christine Cantrell MD  Critical Care Medicine  [facility]

## 2018-12-22 NOTE — ASSESSMENT & PLAN NOTE
82 y/o F with RA (chronically on steroids, plaquenil and sulfasalazine), history of multiple TIA's (on aspirin and plavix),  And vascular dementia presents to the ED on 12/17/18 with a change in her mentation status with blurry vision (baseline able to perform all ADLS exceptionally) as well as right sided leg pain. The patient denies any numbness or tingling in any of her extremities. Of note, the patient ran out of her prednisone 2 days prior to admission. She has been taking 25 mg daily. On arrival to the ED patient had SBP in the 220 (BL -130). She received 2 doses of Labetalol 20mg IV x 2 with improvement in her blood pressure to the 180s.  Head CT and MRI in the ED were negative for any acute process. EEG 12/19 showed toxic medical encephalopathy without epileptic waves. Repeat MRI does not show any intracranial processes.    Plan  1) IR to obtain LP as we had three failed attempts on the floor. On 12/21 patient did not obtain LP on patient as she lost peripheral access when she went downstairs. Patient mental status is waxing and waning.   2) Encephalopathy most likely hypertensive as patient had blurry vision as well as hypertension. CT and MRI negative for stroke. BP control, pt unable to take orally. On IV hydralazine prn for  SBP>180. Reviewed images with radiology again and images do not indicate PRES or temporal lobe enhancement.   3) Another cause of encephalopathy can be due to an infectious process. Patient empirically started on vancomycin, cefepime, and acyclovir. WBC on 12/22 is 14.64.   4) Will obtain 24 hr EEG again as per neuro recommendations.

## 2018-12-22 NOTE — CARE UPDATE
Ativan ordered IV, reordered as IM considering patient does not have IV access             Jose Luis Dan MD  Resident Physician - PGY2

## 2018-12-22 NOTE — ASSESSMENT & PLAN NOTE
· Reports confusion and blurred vision x 2 days, with  on arrival - reports baseline BP to be 110-130s  · S/p Labetalol 20mg IV x 2 doses with improvement in BP to 180s  · Head CT and MRI negative for acute process. Repeat MRI negative which shows no acute intracranial abnormality.   · Takes Coreg 12.5mg PO BID at home.Holding coreg until she is able to swallow.  · Since pt is not safe to take orally at this time, Continue hydralazine 5 mg IV prn for target SBP <180

## 2018-12-22 NOTE — PLAN OF CARE
Problem: Adult Inpatient Plan of Care  Goal: Plan of Care Review  Outcome: Ongoing (interventions implemented as appropriate)    No acute events throughout day. See vital signs and assessments in flowsheets. See below for updates on today's progress.     Pulmonary: pt on RA,   Cardiovascular: ST, HR 110s. SBP stayed <180  Neurological: pt able to follow commands but disoriented to place and situation. Constantly tried to get out of bed and is only redirectable 50% of time.     Gastrointestinal: NPO, no BM  Genitourinary: parikh in place good flow / shift   Integumentary/Other: pt repeatedly tried to get out of bed, bed alarm set and redirected pt. Pt pulled out both IV during day shift. 20g IV in hand placed during shift and was not able to get another IV. TLC was to be placed during night shift but was unable due to another emergency on the unit.    Patient progressing towards goals as tolerated, plan of care communicated and reviewed with Selma Lux MD and family. All concerns addressed. Will continue to monitor.

## 2018-12-22 NOTE — PLAN OF CARE
Problem: Adult Inpatient Plan of Care  Goal: Plan of Care Review  No acute events throughout day. See vital signs and assessments in flowsheets. VSS, sats 93-98% on RA. PRN hydralazine given x1 for SBP in 190s. NSR 70s-80s. LP unsuccessful at bedside per anesthesia. Pt taken to IR for LP under flouro - case aborted due to pt being agitated and loss of all IV access. Several attempts to place peripheral IVs by RNs and MDs. IM haldol given for agitation, plan to place TLC tonight. Patient progressing towards goals as tolerated, plan of care communicated and reviewed with Selma Lux MD and family. All concerns addressed. Will continue to monitor.

## 2018-12-22 NOTE — ASSESSMENT & PLAN NOTE
-- baseline Creatine 1.1.  12/20 FC was inserted due to retention. There is an upward trend of creatinine  -- Urine lytes ordered- urine sodium <20, creatinine 174  -- strict in and out.  -- avoid nephrotoxic medication. Properly renally dosed antibiotics. If patient's creatinine keeps increasing we will discontinue the acyclovir

## 2018-12-23 PROBLEM — E63.9 POOR NUTRITION: Status: ACTIVE | Noted: 2018-12-23

## 2018-12-23 LAB
ALBUMIN SERPL BCP-MCNC: 3.2 G/DL
ALP SERPL-CCNC: 39 U/L
ALT SERPL W/O P-5'-P-CCNC: 16 U/L
ANION GAP SERPL CALC-SCNC: 11 MMOL/L
ANION GAP SERPL CALC-SCNC: 11 MMOL/L
ANION GAP SERPL CALC-SCNC: 13 MMOL/L
AST SERPL-CCNC: 29 U/L
BASOPHILS # BLD AUTO: 0.02 K/UL
BASOPHILS NFR BLD: 0.2 %
BILIRUB SERPL-MCNC: 0.9 MG/DL
BUN SERPL-MCNC: 40 MG/DL
BUN SERPL-MCNC: 42 MG/DL
BUN SERPL-MCNC: 45 MG/DL
CALCIUM SERPL-MCNC: 8.9 MG/DL
CALCIUM SERPL-MCNC: 9.5 MG/DL
CALCIUM SERPL-MCNC: 9.6 MG/DL
CHLORIDE SERPL-SCNC: 110 MMOL/L
CHLORIDE SERPL-SCNC: 110 MMOL/L
CHLORIDE SERPL-SCNC: 111 MMOL/L
CO2 SERPL-SCNC: 18 MMOL/L
CO2 SERPL-SCNC: 21 MMOL/L
CO2 SERPL-SCNC: 21 MMOL/L
CREAT SERPL-MCNC: 1.4 MG/DL
CREAT SERPL-MCNC: 1.5 MG/DL
CREAT SERPL-MCNC: 1.6 MG/DL
DIFFERENTIAL METHOD: ABNORMAL
EOSINOPHIL # BLD AUTO: 0.1 K/UL
EOSINOPHIL NFR BLD: 0.5 %
ERYTHROCYTE [DISTWIDTH] IN BLOOD BY AUTOMATED COUNT: 14.9 %
EST. GFR  (AFRICAN AMERICAN): 34.6 ML/MIN/1.73 M^2
EST. GFR  (AFRICAN AMERICAN): 37.4 ML/MIN/1.73 M^2
EST. GFR  (AFRICAN AMERICAN): 40.6 ML/MIN/1.73 M^2
EST. GFR  (NON AFRICAN AMERICAN): 30 ML/MIN/1.73 M^2
EST. GFR  (NON AFRICAN AMERICAN): 32.4 ML/MIN/1.73 M^2
EST. GFR  (NON AFRICAN AMERICAN): 35.3 ML/MIN/1.73 M^2
GLUCOSE SERPL-MCNC: 114 MG/DL
GLUCOSE SERPL-MCNC: 120 MG/DL
GLUCOSE SERPL-MCNC: 144 MG/DL
HCT VFR BLD AUTO: 40.3 %
HGB BLD-MCNC: 12.8 G/DL
IMM GRANULOCYTES # BLD AUTO: 0.08 K/UL
IMM GRANULOCYTES NFR BLD AUTO: 0.6 %
LYMPHOCYTES # BLD AUTO: 1.6 K/UL
LYMPHOCYTES NFR BLD: 12.1 %
MAGNESIUM SERPL-MCNC: 2.2 MG/DL
MAGNESIUM SERPL-MCNC: 2.7 MG/DL
MCH RBC QN AUTO: 27.5 PG
MCHC RBC AUTO-ENTMCNC: 31.8 G/DL
MCV RBC AUTO: 87 FL
MONOCYTES # BLD AUTO: 2.7 K/UL
MONOCYTES NFR BLD: 20.5 %
NEUTROPHILS # BLD AUTO: 8.7 K/UL
NEUTROPHILS NFR BLD: 66.1 %
NRBC BLD-RTO: 0 /100 WBC
PHOSPHATE SERPL-MCNC: 2.7 MG/DL
PHOSPHATE SERPL-MCNC: 3.1 MG/DL
PLATELET # BLD AUTO: 171 K/UL
PMV BLD AUTO: 10.8 FL
POTASSIUM SERPL-SCNC: 3.5 MMOL/L
POTASSIUM SERPL-SCNC: 3.7 MMOL/L
POTASSIUM SERPL-SCNC: 4.9 MMOL/L
PROT SERPL-MCNC: 6.8 G/DL
RBC # BLD AUTO: 4.65 M/UL
SODIUM SERPL-SCNC: 139 MMOL/L
SODIUM SERPL-SCNC: 142 MMOL/L
SODIUM SERPL-SCNC: 145 MMOL/L
VANCOMYCIN TROUGH SERPL-MCNC: 20.6 UG/ML
WBC # BLD AUTO: 13.1 K/UL

## 2018-12-23 PROCEDURE — 25000003 PHARM REV CODE 250: Performed by: INTERNAL MEDICINE

## 2018-12-23 PROCEDURE — 80048 BASIC METABOLIC PNL TOTAL CA: CPT | Mod: 91

## 2018-12-23 PROCEDURE — 43752 NASAL/OROGASTRIC W/TUBE PLMT: CPT

## 2018-12-23 PROCEDURE — 20000000 HC ICU ROOM

## 2018-12-23 PROCEDURE — 25000003 PHARM REV CODE 250: Performed by: STUDENT IN AN ORGANIZED HEALTH CARE EDUCATION/TRAINING PROGRAM

## 2018-12-23 PROCEDURE — 93010 ELECTROCARDIOGRAM REPORT: CPT | Mod: ,,, | Performed by: INTERNAL MEDICINE

## 2018-12-23 PROCEDURE — 85025 COMPLETE CBC W/AUTO DIFF WBC: CPT

## 2018-12-23 PROCEDURE — 84100 ASSAY OF PHOSPHORUS: CPT | Mod: 91

## 2018-12-23 PROCEDURE — 93005 ELECTROCARDIOGRAM TRACING: CPT

## 2018-12-23 PROCEDURE — 93010 EKG 12-LEAD: ICD-10-PCS | Mod: ,,, | Performed by: INTERNAL MEDICINE

## 2018-12-23 PROCEDURE — 94761 N-INVAS EAR/PLS OXIMETRY MLT: CPT

## 2018-12-23 PROCEDURE — 95813 EEG EXTND MNTR 61-119 MIN: CPT | Mod: 26,,, | Performed by: PSYCHIATRY & NEUROLOGY

## 2018-12-23 PROCEDURE — 99291 CRITICAL CARE FIRST HOUR: CPT | Mod: GC,,, | Performed by: INTERNAL MEDICINE

## 2018-12-23 PROCEDURE — 83735 ASSAY OF MAGNESIUM: CPT

## 2018-12-23 PROCEDURE — 80048 BASIC METABOLIC PNL TOTAL CA: CPT

## 2018-12-23 PROCEDURE — 63600175 PHARM REV CODE 636 W HCPCS: Performed by: STUDENT IN AN ORGANIZED HEALTH CARE EDUCATION/TRAINING PROGRAM

## 2018-12-23 PROCEDURE — 99291 PR CRITICAL CARE, E/M 30-74 MINUTES: ICD-10-PCS | Mod: GC,,, | Performed by: INTERNAL MEDICINE

## 2018-12-23 PROCEDURE — 80202 ASSAY OF VANCOMYCIN: CPT

## 2018-12-23 PROCEDURE — 63600175 PHARM REV CODE 636 W HCPCS: Performed by: INTERNAL MEDICINE

## 2018-12-23 PROCEDURE — 83735 ASSAY OF MAGNESIUM: CPT | Mod: 91

## 2018-12-23 PROCEDURE — 95813 PR EEG, EXTENDED, 61-119 MINS: ICD-10-PCS | Mod: 26,,, | Performed by: PSYCHIATRY & NEUROLOGY

## 2018-12-23 PROCEDURE — 84100 ASSAY OF PHOSPHORUS: CPT

## 2018-12-23 PROCEDURE — 80053 COMPREHEN METABOLIC PANEL: CPT

## 2018-12-23 RX ORDER — POTASSIUM CHLORIDE 20 MEQ/15ML
40 SOLUTION ORAL ONCE
Status: COMPLETED | OUTPATIENT
Start: 2018-12-23 | End: 2018-12-23

## 2018-12-23 RX ORDER — CARVEDILOL 12.5 MG/1
12.5 TABLET ORAL 2 TIMES DAILY
Status: DISCONTINUED | OUTPATIENT
Start: 2018-12-23 | End: 2018-12-25

## 2018-12-23 RX ORDER — FUROSEMIDE 10 MG/ML
40 INJECTION INTRAMUSCULAR; INTRAVENOUS ONCE
Status: COMPLETED | OUTPATIENT
Start: 2018-12-23 | End: 2018-12-23

## 2018-12-23 RX ORDER — VANCOMYCIN HCL IN 5 % DEXTROSE 1G/250ML
1000 PLASTIC BAG, INJECTION (ML) INTRAVENOUS
Status: DISCONTINUED | OUTPATIENT
Start: 2018-12-24 | End: 2018-12-25

## 2018-12-23 RX ADMIN — CEFEPIME 2 G: 2 INJECTION, POWDER, FOR SOLUTION INTRAVENOUS at 01:12

## 2018-12-23 RX ADMIN — ACYCLOVIR SODIUM 520 MG: 50 INJECTION, SOLUTION INTRAVENOUS at 01:12

## 2018-12-23 RX ADMIN — FUROSEMIDE 40 MG: 10 INJECTION, SOLUTION INTRAMUSCULAR; INTRAVENOUS at 08:12

## 2018-12-23 RX ADMIN — TRAZODONE HYDROCHLORIDE 25 MG: 50 TABLET ORAL at 08:12

## 2018-12-23 RX ADMIN — ACYCLOVIR SODIUM 520 MG: 50 INJECTION, SOLUTION INTRAVENOUS at 10:12

## 2018-12-23 RX ADMIN — POTASSIUM CHLORIDE 40 MEQ: 20 SOLUTION ORAL at 11:12

## 2018-12-23 RX ADMIN — METHYLPREDNISOLONE SODIUM SUCCINATE 20 MG: 40 INJECTION, POWDER, FOR SOLUTION INTRAMUSCULAR; INTRAVENOUS at 08:12

## 2018-12-23 RX ADMIN — VANCOMYCIN HYDROCHLORIDE 1000 MG: 1 INJECTION, POWDER, LYOPHILIZED, FOR SOLUTION INTRAVENOUS at 01:12

## 2018-12-23 RX ADMIN — CARVEDILOL 12.5 MG: 12.5 TABLET, FILM COATED ORAL at 08:12

## 2018-12-23 NOTE — CONSULTS
" [facility]  Adult Nutrition  Consult Note     SUMMARY     RD received consult for "Patient started on NG tube feeds. Recs for feeds."  RD services currently following pt. Please see note dated 12/20/18 for full assessment.     12/20/18 Recommendations:  1. If able to advance diet, recommend Regular diet (texture per SLP).   2. If unable to advance diet, place NGT & initiate enteral nutrition.   Recommend Isosource 1.5 @ 40 mL/hr                  - to provide 1440 kcals, 65 g of protein, 733 mL fluid.               - Hold for residuals >500 mL; additional fluid per MD.  3. RD to monitor & follow-up.     RD to follow up as previously scheduled.     Thanks,  Cami  [id number]    "

## 2018-12-23 NOTE — ASSESSMENT & PLAN NOTE
-- baseline Creatine 1.1.  12/20 FC was inserted due to retention. There is an upward trend of creatinine  -- Urine lytes ordered- urine sodium <20, creatinine 174  -- strict in and out. Please  Match I/O  -- avoid nephrotoxic medication. Properly renally dosed antibiotics. If patient's creatinine keeps increasing we will discontinue the acyclovir

## 2018-12-23 NOTE — PT/OT/SLP PROGRESS
Occupational Therapy      Patient Name:  Selma Lux MD   MRN:  [id number]    Patient not seen this date as nsg reports pt remains confused and agitated. OT to check status at later date.     AYAD Hernandez  12/23/2018

## 2018-12-23 NOTE — ASSESSMENT & PLAN NOTE
Although SLP has passed the patient to have regular diet with thin liquids, patient is too encephalopathic to swallow. NG tube placed.Recommend Isosource 1.5 @ 40 mL/hr to provide 1440 kcals, 65 g of protein, 733 mL fluid. Hold for residuals >500 mL; additional fluid per MD.

## 2018-12-23 NOTE — PLAN OF CARE
Problem: Adult Inpatient Plan of Care  Goal: Plan of Care Review  Outcome: Ongoing (interventions implemented as appropriate)  No acute events throughout day. See vital signs and assessments in flowsheets. VSS, PRN hydralazine given x1 for SBP in 190s. Sinus tach 120s. Plan for midline Monday morning with LP in IR on Wednesday if not able to do on Monday afternoon. Patient progressing towards goals as tolerated, plan of care communicated and reviewed with Selma Lux MD, and daughter, and attending at bedside today. Questions encouraged and all concerns addressed. Will continue to monitor patient.     Neuro: mental status is waxing and waning, with encouragement can answer orientation questions correctly, 24hr EEG placed today, possible seizures?   Pulmonary: 98% on Room air, no c/o sob or hypoxemia, possible concern for airway protection-intubation watch  GI: not appropriate to perform bedside swallow study, frequent wax/wane of mental status, npo at this time  : parikh in place, UOP 25ish/hr, BUN/Cr trend increasing   Skin: nadiya soft wrist restraints in use, no injuries noted, skin c/d/i, no breakdown, repositioned frequently  ABX: vanc, acyclovir, cefepime. Afebrile, trending WBC

## 2018-12-23 NOTE — ASSESSMENT & PLAN NOTE
84 y/o F with RA (chronically on steroids, plaquenil and sulfasalazine), history of multiple TIA's (on aspirin and plavix),  And vascular dementia presents to the ED on 12/17/18 with a change in her mentation status with blurry vision (baseline able to perform all ADLS exceptionally) as well as right sided leg pain. The patient denies any numbness or tingling in any of her extremities. Of note, the patient ran out of her prednisone 2 days prior to admission. She has been taking 25 mg daily. On arrival to the ED patient had SBP in the 220 (BL -130). She received 2 doses of Labetalol 20mg IV x 2 with improvement in her blood pressure to the 180s.  Head CT and MRI in the ED were negative for any acute process. EEG 12/19 showed toxic medical encephalopathy without epileptic waves. Repeat MRI does not show any intracranial processes.    Plan  1) IR to obtain LP as we had three failed attempts on the floor. On 12/21 patient did not obtain LP on patient as she lost peripheral access when she went downstairs. Patient mental status is waxing and waning. Please obtain midline tomorrow for fluro LP. As seizures was ruled out with second EEG it is pivotal to rule out infectious cases of encephalopathy to determine prognosis and possible diagnosis.   2) Encephalopathy initially thought to be hypertensive as patient had blurry vision as well as hypertension. CT and MRI negative for stroke. BP control, pt unable to take orally. On IV hydralazine prn for  SBP>180. Reviewed images with radiology again and images do not indicate PRES or temporal lobe enhancement.   3)Patient empirically started on vancomycin, cefepime, and acyclovir. WBC on 12/23 is 13.1.   4) Patient scheduled for trazodone at night to decrease delirium that is mostly present during the night time.

## 2018-12-23 NOTE — PROGRESS NOTES
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 Acyclovir 520 mg Q24H will be adjusted to 520 mg Q12H for improving renal function    Pharmacist's Name: Mary Olmos, PharmD  Pharmacist's Extension: [phone]

## 2018-12-23 NOTE — ASSESSMENT & PLAN NOTE
· Reports confusion and blurred vision x 2 days, with  on arrival - reports baseline BP to be 110-130s  · S/p Labetalol 20mg IV x 2 doses with improvement in BP to 180s  · Head CT and MRI negative for acute process. Repeat MRI negative which shows no acute intracranial abnormality.   · Takes Coreg 12.5mg PO BID at home.Restarted medication as patient has NG tube placement.  · Continue hydralazine 5 mg IV prn for target SBP <180 if additional BP medication needs to be ordered.

## 2018-12-23 NOTE — SUBJECTIVE & OBJECTIVE
Interval History/Significant Events: Overnight patient was more delirious than usual (possible sundowning). BUN/Cr trending downwards. NG tube placed on patient. Started tube feeds. Will give carvedilol for blood pressure control. Trazadone at night. Preliminary EEG results do not show status epilepticus or focal seizures.  -At this point it is vital to obtain LP to r/o infectious causes of encephalitis. Will get midline first thing and the morning and a fluoroscopy LP.     Review of Systems   Unable to perform ROS: Mental status change      Objective:     Vital Signs (Most Recent):  Temp: 97.6 °F (36.4 °C) (12/23/18 0700)  Pulse: (!) 121 (12/23/18 1000)  Resp: (!) 34 (12/23/18 1000)  BP: (!) 162/75 (12/23/18 1000)  SpO2: 96 % (12/23/18 1000) Vital Signs (24h Range):  Temp:  [97.6 °F (36.4 °C)-98.7 °F (37.1 °C)] 97.6 °F (36.4 °C)  Pulse:  [119-134] 121  Resp:  [22-42] 34  SpO2:  [95 %-98 %] 96 %  BP: (143-197)/() 162/75   Weight: 72.4 kg (159 lb 9.8 oz)  Body mass index is 28.27 kg/m².      Intake/Output Summary (Last 24 hours) at 12/23/2018 1614  Last data filed at 12/23/2018 1000  Gross per 24 hour   Intake 160 ml   Output 955 ml   Net -795 ml       Physical Exam   Constitutional: No distress.   HENT:   Head: Normocephalic and atraumatic.   Eyes: Pupils are equal, round, and reactive to light. Right eye exhibits no discharge. Left eye exhibits no discharge.   Pulmonary/Chest: She has rales.   Abdominal: Soft. She exhibits distension. She exhibits no mass. There is no tenderness. There is no guarding.   Neurological: She displays normal reflexes. She exhibits abnormal muscle tone. Coordination normal.   Yesterday patient was AOx3 however, today patient is not alert to time, place or person. Patient follows commands but does not track. PEERLA.      Skin: Skin is warm and dry. She is not diaphoretic.        Lines/Drains/Airways     Drain                 Urethral Catheter 12/19/18 1955 Straight-tip 3 days           Peripheral Intravenous Line                 Peripheral IV - Single Lumen 12/22/18 1040 Posterior;Right Hand 1 day              Significant Labs:    CBC/Anemia Profile:  Recent Labs   Lab 12/22/18  0439 12/23/18  0429   WBC 14.64* 13.10*   HGB 14.0 12.8   HCT 43.8 40.3    171   MCV 86 87   RDW 14.8* 14.9*        Chemistries:  Recent Labs   Lab 12/22/18  0439 12/23/18  0429 12/23/18  1320    139 145   K 3.6 4.9 3.7    110 111*   CO2 19* 18* 21*   BUN 42* 40* 42*   CREATININE 1.5* 1.4 1.5*   CALCIUM 9.1 8.9 9.5   ALBUMIN 3.2* 3.2*  --    PROT 6.9 6.8  --    BILITOT 1.2* 0.9  --    ALKPHOS 40* 39*  --    ALT 9* 16  --    AST 20 29  --    MG 2.5 2.7*  --    PHOS 2.7 2.7  --        Bilirubin:   Recent Labs   Lab 12/19/18  0300 12/20/18  0310 12/21/18  0412 12/22/18  0439 12/23/18  0429   BILIDIR 0.5*  --   --   --   --    BILITOT 1.5* 1.3* 0.9 1.2* 0.9     BMP:   Recent Labs   Lab 12/23/18  0429 12/23/18  1320   * 144*    145   K 4.9 3.7    111*   CO2 18* 21*   BUN 40* 42*   CREATININE 1.4 1.5*   CALCIUM 8.9 9.5   MG 2.7*  --      CMP:   Recent Labs   Lab 12/22/18  0439 12/23/18  0429 12/23/18  1320    139 145   K 3.6 4.9 3.7    110 111*   CO2 19* 18* 21*   * 114* 144*   BUN 42* 40* 42*   CREATININE 1.5* 1.4 1.5*   CALCIUM 9.1 8.9 9.5   PROT 6.9 6.8  --    ALBUMIN 3.2* 3.2*  --    BILITOT 1.2* 0.9  --    ALKPHOS 40* 39*  --    AST 20 29  --    ALT 9* 16  --    ANIONGAP 14 11 13   EGFRNONAA 32.4* 35.3* 32.4*       Significant Imaging:  I have reviewed all pertinent imaging results/findings within the past 24 hours.

## 2018-12-23 NOTE — PLAN OF CARE
Problem: Adult Inpatient Plan of Care  Goal: Plan of Care Review  Outcome: Ongoing (interventions implemented as appropriate)  No acute events throughout day. See vital signs and assessments in flowsheets. See below for updates on today's progress.   Pulmonary: pt on RA,   Cardiovascular: ST,  -130s. SBP stayed <180  Neurological: pt able to follow commands but disoriented to place and situation. Pt still trying to get out of bed and is only redirectable 50% of time.     Gastrointestinal: NPO, no BM   Genitourinary: parikh in place good flow / shift   Integumentary/Other: pt repeatedly tried to get out of bed, bed alarm set and redirected pt. siiter at bedside until sitter was pulled to go to another room   Patient progressing towards goals as tolerated, plan of care communicated and reviewed with Selma Lux MD and family. All concerns addressed. Will continue to monitor.

## 2018-12-24 LAB
ALBUMIN SERPL BCP-MCNC: 3.1 G/DL
ALP SERPL-CCNC: 45 U/L
ALT SERPL W/O P-5'-P-CCNC: 23 U/L
ANION GAP SERPL CALC-SCNC: 11 MMOL/L
ANISOCYTOSIS BLD QL SMEAR: SLIGHT
AST SERPL-CCNC: 20 U/L
BACTERIA BLD CULT: NORMAL
BACTERIA BLD CULT: NORMAL
BASOPHILS # BLD AUTO: 0.03 K/UL
BASOPHILS NFR BLD: 0.2 %
BILIRUB SERPL-MCNC: 0.7 MG/DL
BUN SERPL-MCNC: 49 MG/DL
BURR CELLS BLD QL SMEAR: ABNORMAL
CALCIUM SERPL-MCNC: 9.5 MG/DL
CHLORIDE SERPL-SCNC: 114 MMOL/L
CLARITY CSF: CLEAR
CLARITY CSF: CLEAR
CO2 SERPL-SCNC: 20 MMOL/L
COLOR CSF: COLORLESS
COLOR CSF: COLORLESS
CREAT SERPL-MCNC: 1.6 MG/DL
DIFFERENTIAL METHOD: ABNORMAL
EOSINOPHIL # BLD AUTO: 0.2 K/UL
EOSINOPHIL NFR BLD: 1.4 %
ERYTHROCYTE [DISTWIDTH] IN BLOOD BY AUTOMATED COUNT: 14.8 %
EST. GFR  (AFRICAN AMERICAN): 34.6 ML/MIN/1.73 M^2
EST. GFR  (NON AFRICAN AMERICAN): 30 ML/MIN/1.73 M^2
GLUCOSE CSF-MCNC: 106 MG/DL
GLUCOSE SERPL-MCNC: 139 MG/DL
HCT VFR BLD AUTO: 40.5 %
HGB BLD-MCNC: 13 G/DL
HYPOCHROMIA BLD QL SMEAR: ABNORMAL
IMM GRANULOCYTES # BLD AUTO: 0.07 K/UL
IMM GRANULOCYTES NFR BLD AUTO: 0.5 %
LYMPHOCYTES # BLD AUTO: 1.8 K/UL
LYMPHOCYTES NFR BLD: 13.3 %
LYMPHOCYTES NFR CSF MANUAL: 80 %
MAGNESIUM SERPL-MCNC: 2.2 MG/DL
MCH RBC QN AUTO: 28.2 PG
MCHC RBC AUTO-ENTMCNC: 32.1 G/DL
MCV RBC AUTO: 88 FL
MONOCYTES # BLD AUTO: 2.8 K/UL
MONOCYTES NFR BLD: 20.5 %
MONOS+MACROS NFR CSF MANUAL: 20 %
NEUTROPHILS # BLD AUTO: 8.6 K/UL
NEUTROPHILS NFR BLD: 64.1 %
NRBC BLD-RTO: 0 /100 WBC
OVALOCYTES BLD QL SMEAR: ABNORMAL
PHOSPHATE SERPL-MCNC: 2.6 MG/DL
PLATELET # BLD AUTO: 172 K/UL
PLATELET BLD QL SMEAR: ABNORMAL
PMV BLD AUTO: 10.4 FL
POIKILOCYTOSIS BLD QL SMEAR: SLIGHT
POTASSIUM SERPL-SCNC: 4 MMOL/L
PROT CSF-MCNC: 102 MG/DL
PROT SERPL-MCNC: 6.4 G/DL
RBC # BLD AUTO: 4.61 M/UL
RBC # CSF: 2 /CU MM
RBC # CSF: 22 /CU MM
SODIUM SERPL-SCNC: 145 MMOL/L
SPECIMEN VOL CSF: 3 ML
SPECIMEN VOL CSF: 3 ML
WBC # BLD AUTO: 13.49 K/UL
WBC # CSF: 0 /CU MM
WBC # CSF: 2 /CU MM

## 2018-12-24 PROCEDURE — 86653 ENCEPHALTIS ST LOUIS ANTBODY: CPT | Mod: 91

## 2018-12-24 PROCEDURE — 87798 DETECT AGENT NOS DNA AMP: CPT | Mod: 91

## 2018-12-24 PROCEDURE — 86618 LYME DISEASE ANTIBODY: CPT

## 2018-12-24 PROCEDURE — 87070 CULTURE OTHR SPECIMN AEROBIC: CPT

## 2018-12-24 PROCEDURE — 63600175 PHARM REV CODE 636 W HCPCS: Performed by: STUDENT IN AN ORGANIZED HEALTH CARE EDUCATION/TRAINING PROGRAM

## 2018-12-24 PROCEDURE — 87798 DETECT AGENT NOS DNA AMP: CPT

## 2018-12-24 PROCEDURE — 86592 SYPHILIS TEST NON-TREP QUAL: CPT

## 2018-12-24 PROCEDURE — 82164 ANGIOTENSIN I ENZYME TEST: CPT

## 2018-12-24 PROCEDURE — 99233 PR SUBSEQUENT HOSPITAL CARE,LEVL III: ICD-10-PCS | Mod: GC,,, | Performed by: INTERNAL MEDICINE

## 2018-12-24 PROCEDURE — 85025 COMPLETE CBC W/AUTO DIFF WBC: CPT

## 2018-12-24 PROCEDURE — 76937 US GUIDE VASCULAR ACCESS: CPT

## 2018-12-24 PROCEDURE — 86403 PARTICLE AGGLUT ANTBDY SCRN: CPT

## 2018-12-24 PROCEDURE — 63600175 PHARM REV CODE 636 W HCPCS: Performed by: INTERNAL MEDICINE

## 2018-12-24 PROCEDURE — 99233 SBSQ HOSP IP/OBS HIGH 50: CPT | Mod: GC,,, | Performed by: INTERNAL MEDICINE

## 2018-12-24 PROCEDURE — 36569 INSJ PICC 5 YR+ W/O IMAGING: CPT

## 2018-12-24 PROCEDURE — 25000003 PHARM REV CODE 250: Performed by: INTERNAL MEDICINE

## 2018-12-24 PROCEDURE — 87529 HSV DNA AMP PROBE: CPT

## 2018-12-24 PROCEDURE — 20000000 HC ICU ROOM

## 2018-12-24 PROCEDURE — 25000003 PHARM REV CODE 250: Performed by: STUDENT IN AN ORGANIZED HEALTH CARE EDUCATION/TRAINING PROGRAM

## 2018-12-24 PROCEDURE — 94761 N-INVAS EAR/PLS OXIMETRY MLT: CPT

## 2018-12-24 PROCEDURE — 82945 GLUCOSE OTHER FLUID: CPT

## 2018-12-24 PROCEDURE — 84100 ASSAY OF PHOSPHORUS: CPT

## 2018-12-24 PROCEDURE — 87205 SMEAR GRAM STAIN: CPT

## 2018-12-24 PROCEDURE — 84157 ASSAY OF PROTEIN OTHER: CPT

## 2018-12-24 PROCEDURE — 99000 SPECIMEN HANDLING OFFICE-LAB: CPT

## 2018-12-24 PROCEDURE — 89051 BODY FLUID CELL COUNT: CPT

## 2018-12-24 PROCEDURE — C1751 CATH, INF, PER/CENT/MIDLINE: HCPCS

## 2018-12-24 PROCEDURE — 83735 ASSAY OF MAGNESIUM: CPT

## 2018-12-24 PROCEDURE — 92526 ORAL FUNCTION THERAPY: CPT

## 2018-12-24 PROCEDURE — 80053 COMPREHEN METABOLIC PANEL: CPT

## 2018-12-24 RX ORDER — MIDAZOLAM HYDROCHLORIDE 1 MG/ML
2 INJECTION INTRAMUSCULAR; INTRAVENOUS ONCE
Status: COMPLETED | OUTPATIENT
Start: 2018-12-24 | End: 2018-12-24

## 2018-12-24 RX ADMIN — MIDAZOLAM HYDROCHLORIDE 2 MG: 1 INJECTION, SOLUTION INTRAMUSCULAR; INTRAVENOUS at 04:12

## 2018-12-24 RX ADMIN — SODIUM CHLORIDE, SODIUM LACTATE, POTASSIUM CHLORIDE, AND CALCIUM CHLORIDE 500 ML: .6; .31; .03; .02 INJECTION, SOLUTION INTRAVENOUS at 02:12

## 2018-12-24 RX ADMIN — ACYCLOVIR SODIUM 520 MG: 50 INJECTION, SOLUTION INTRAVENOUS at 09:12

## 2018-12-24 RX ADMIN — CEFEPIME 2 G: 2 INJECTION, POWDER, FOR SOLUTION INTRAVENOUS at 10:12

## 2018-12-24 RX ADMIN — METHYLPREDNISOLONE SODIUM SUCCINATE 20 MG: 40 INJECTION, POWDER, FOR SOLUTION INTRAMUSCULAR; INTRAVENOUS at 10:12

## 2018-12-24 RX ADMIN — VANCOMYCIN HYDROCHLORIDE 1000 MG: 1 INJECTION, POWDER, LYOPHILIZED, FOR SOLUTION INTRAVENOUS at 02:12

## 2018-12-24 RX ADMIN — CARVEDILOL 12.5 MG: 12.5 TABLET, FILM COATED ORAL at 09:12

## 2018-12-24 RX ADMIN — CARVEDILOL 12.5 MG: 12.5 TABLET, FILM COATED ORAL at 10:12

## 2018-12-24 RX ADMIN — ACYCLOVIR SODIUM 520 MG: 50 INJECTION, SOLUTION INTRAVENOUS at 10:12

## 2018-12-24 RX ADMIN — TRAZODONE HYDROCHLORIDE 25 MG: 50 TABLET ORAL at 11:12

## 2018-12-24 NOTE — ASSESSMENT & PLAN NOTE
84 y/o F with RA (chronically on steroids, plaquenil and sulfasalazine), history of multiple TIA's (on aspirin and plavix),  And vascular dementia presents to the ED on 12/17/18 with a change in her mentation status with blurry vision (baseline able to perform all ADLS exceptionally) as well as right sided leg pain. The patient denies any numbness or tingling in any of her extremities. Of note, the patient ran out of her prednisone 2 days prior to admission. She has been taking 25 mg daily. On arrival to the ED patient had SBP in the 220 (BL -130). She received 2 doses of Labetalol 20mg IV x 2 with improvement in her blood pressure to the 180s.  Head CT and MRI in the ED were negative for any acute process. EEG 12/19 showed toxic medical encephalopathy without epileptic waves. Repeat MRI does not show any intracranial processes.    Plan  1) IR to obtain LP as we had three failed attempts on the floor. On 12/21 patient did not obtain LP on patient as she lost peripheral access when she went downstairs. Patient mental status is waxing and waning. Plan for midline today for fluro LP either today or wednesday. As seizures was ruled out with second EEG it is pivotal to rule out infectious cases of encephalopathy to determine prognosis and possible diagnosis.   2) Encephalopathy initially thought to be hypertensive as patient had blurry vision as well as hypertension. CT and MRI negative for stroke. BP control, pt unable to take orally. On IV hydralazine prn for  SBP>180. Reviewed images with radiology again and images do not indicate PRES or temporal lobe enhancement.   3)Patient empirically started on vancomycin, cefepime, and acyclovir. WBC on 12/24 --> 13.49  4) Patient scheduled for trazodone at night to decrease delirium that is mostly present during the night time.

## 2018-12-24 NOTE — SUBJECTIVE & OBJECTIVE
Interval History/Significant Events: no issues overnight. Was able to open her eye and speaks couple words but confused. Midline today +/- IR LP    Review of Systems   Unable to perform ROS: Mental status change      Objective:     Vital Signs (Most Recent):  Temp: 98.8 °F (37.1 °C) (12/24/18 0710)  Pulse: 103 (12/24/18 0710)  Resp: (!) 25 (12/24/18 0710)  BP: (!) 168/77 (12/24/18 0710)  SpO2: 97 % (12/24/18 0710) Vital Signs (24h Range):  Temp:  [97.4 °F (36.3 °C)-99 °F (37.2 °C)] 98.8 °F (37.1 °C)  Pulse:  [] 103  Resp:  [24-39] 25  SpO2:  [96 %-98 %] 97 %  BP: ()/() 168/77   Weight: 72.4 kg (159 lb 9.8 oz)  Body mass index is 28.27 kg/m².      Intake/Output Summary (Last 24 hours) at 12/24/2018 0916  Last data filed at 12/24/2018 0701  Gross per 24 hour   Intake 1290 ml   Output 1453 ml   Net -163 ml       Physical Exam   Constitutional: No distress.   HENT:   Head: Normocephalic and atraumatic.   Eyes: Pupils are equal, round, and reactive to light. Right eye exhibits no discharge. Left eye exhibits no discharge.   Pulmonary/Chest: She has rales.   Abdominal: Soft. She exhibits distension. She exhibits no mass. There is no tenderness. There is no guarding.   Neurological: She displays normal reflexes. She exhibits abnormal muscle tone. Coordination normal.   Opens her eyes. Patient follows simple commands but does not track. PEERLA.      Skin: Skin is warm and dry. She is not diaphoretic.        Lines/Drains/Airways     Drain                 Urethral Catheter 12/19/18 1955 Straight-tip 4 days         NG/OG Tube 12/23/18 1430 Staples sump 14 Fr. Left nostril less than 1 day          Peripheral Intravenous Line                 Peripheral IV - Single Lumen 12/22/18 1040 Posterior;Right Hand 1 day              Significant Labs:    CBC/Anemia Profile:  Recent Labs   Lab 12/23/18  0429 12/24/18  0346   WBC 13.10* 13.49*   HGB 12.8 13.0   HCT 40.3 40.5    172   MCV 87 88   RDW 14.9* 14.8*         Chemistries:  Recent Labs   Lab 12/23/18  0429 12/23/18  1320 12/23/18  2154 12/24/18  0346    145 142 145   K 4.9 3.7 3.5 4.0    111* 110 114*   CO2 18* 21* 21* 20*   BUN 40* 42* 45* 49*   CREATININE 1.4 1.5* 1.6* 1.6*   CALCIUM 8.9 9.5 9.6 9.5   ALBUMIN 3.2*  --   --  3.1*   PROT 6.8  --   --  6.4   BILITOT 0.9  --   --  0.7   ALKPHOS 39*  --   --  45*   ALT 16  --   --  23   AST 29  --   --  20   MG 2.7*  --  2.2 2.2   PHOS 2.7  --  3.1 2.6*       Bilirubin:   Recent Labs   Lab 12/19/18  0300 12/20/18  0310 12/21/18  0412 12/22/18  0439 12/23/18  0429 12/24/18  0346   BILIDIR 0.5*  --   --   --   --   --    BILITOT 1.5* 1.3* 0.9 1.2* 0.9 0.7     BMP:   Recent Labs   Lab 12/24/18  0346   *      K 4.0   *   CO2 20*   BUN 49*   CREATININE 1.6*   CALCIUM 9.5   MG 2.2     CMP:   Recent Labs   Lab 12/23/18  0429 12/23/18  1320 12/23/18 2154 12/24/18  0346    145 142 145   K 4.9 3.7 3.5 4.0    111* 110 114*   CO2 18* 21* 21* 20*   * 144* 120* 139*   BUN 40* 42* 45* 49*   CREATININE 1.4 1.5* 1.6* 1.6*   CALCIUM 8.9 9.5 9.6 9.5   PROT 6.8  --   --  6.4   ALBUMIN 3.2*  --   --  3.1*   BILITOT 0.9  --   --  0.7   ALKPHOS 39*  --   --  45*   AST 29  --   --  20   ALT 16  --   --  23   ANIONGAP 11 13 11 11   EGFRNONAA 35.3* 32.4* 30.0* 30.0*       Significant Imaging:  I have reviewed all pertinent imaging results/findings within the past 24 hours.

## 2018-12-24 NOTE — ASSESSMENT & PLAN NOTE
-- baseline Creatine 1.1.  12/20 FC was inserted due to retention. There is an upward trend of creatinine  -- Urine lytes ordered- urine sodium <20, creatinine 174  -- strict in and out. Please  Match I/O  -- avoid nephrotoxic medication. Properly renally dosed antibiotics. If patient's creatinine keeps increasing, this could be due to acyclovir. Give IVF with caution?

## 2018-12-24 NOTE — PROCEDURES
Radiology Post-Procedure Note    Pre Op Diagnosis: AMS    Post Op Diagnosis: Same    Procedure: lumbar puncture    Procedure performed by: Rashad Bee MD and Demond Eubanks MD    Written Informed Consent Obtained: Yes    Specimen Removed: 11 mL CSF    Estimated Blood Loss: Minimal    Findings: Following written informed consent and sterile prep and drape, a 22 gauge spinal needle was inserted at L1 - L2 intralaminar space under fluoroscopic surveillance.  11 mL clear CSF removed and sent to the lab for further analysis.  There were no complications.    Patient tolerated procedure well.    Demond Eubanks MD  Radiology, PGY - III  [phone]

## 2018-12-24 NOTE — CONSULTS
Single lumen 18 g x 10 cm midline placed to L BASILICvein.  Max dwell date 01.22.19, Lot# [id number].  Needle advances into vein under realtime Ultrasound guidance, image recorded and saved.

## 2018-12-24 NOTE — NURSING
Notified team about patient's urine output decreasing over past few hours.  Last hour 20ml, prior hours 30, 35, 35.  MD acknowledges.  No new orders given.

## 2018-12-24 NOTE — PLAN OF CARE
12/24/18 1327   Right Care Assessment   Can the patient answer the patient profile reliably? No, cognitively impaired  (Mentation waxes and wanes)   How often would a person be available to care for the patient? Whenever needed   Describe the patient's ability to walk at the present time. Major restrictions/daily assistance from another person   How does the patient rate their overall health at the present time? Fair   Number of comorbid conditions (as recorded on the chart) Four   During the past month, has the patient often been bothered by feeling down, depressed or hopeless? No   During the past month, has the patient often been bothered by little interest or pleasure in doing things? No   Kayleen Arellano RN, BSN, CCM  Case Management  [facility]  Ext. [phone]

## 2018-12-24 NOTE — PLAN OF CARE
Problem: Adult Inpatient Plan of Care  Goal: Plan of Care Review  Outcome: Ongoing (interventions implemented as appropriate)  POC reviewed with patient and daughter.  Patient still donny

## 2018-12-24 NOTE — PROCEDURES
DATE OF PROCEDURE:  12/22/2018 and 12/23/2018    ICU EEG/VIDEO MONITORING REPORT    METHODOLOGY:  Electroencephalographic (EEG) is recorded with electrodes placed   according to the International 10-20 placement system.  Thirty two (32) channels   of digital signal (sampling rate of 512/sec), including T1 and T2, were   simultaneously recorded from the scalp and may include EKG, EMG, and/or eye   monitors.  Recording band pass was 0.1 to 512 Hz.  Digital video recording of   the patient is simultaneously recorded with the EEG.  The patient is instructed   to report clinical symptoms which may occur during the recording session.  EEG   and video recording are stored and archived in digital format.  Activation   procedures, which include photic stimulation, hyperventilation and instructing   patients to perform simple tasks, are done in selected patients.    The EEG is displayed on a monitor screen and can be reviewed using different   montages.  Computer-assisted analysis is employed to detect spike and   electrographic seizure activity.  The entire record is submitted for computer   analysis.  The entire recording is visually reviewed, and the times identified   by computer analysis as being spikes or seizures are reviewed again.    Compressed spectral analysis (CSA) is also performed on the activity recorded   from each individual channel.  This is displayed as a power display of   frequencies from 0 to 30 Hz over time.  The CSA is reviewed looking for   asymmetries in power between homologous areas of the scalp, then compared with   the original EEG recording.    [employer] software was also utilized in the review of this study.  This software   suite analyzes the EEG recording in multiple domains.  Coherence and rhythmicity   are computed to identify EEG sections which may contain organized seizures.    Each channel undergoes analysis to detect the presence of spike and sharp waves   which have special and  morphological characteristics of epileptic activity.  The   routine EEG recording is converted from special into frequency domain.  This is   then displayed comparing homologous areas to identify areas of significant   asymmetry.  Algorithm to identify non-cortically generated artifact is used to   separate artifact from the EEG.    RECORDING TIMES:  Start on 12/22/2018 at 1443  Stop on 12/23/2018 at 0950  Total duration is 19 hours and 1 minute.    EEG FINDINGS:  This study consists of medium amplitude background with diffuse   alpha and beta activity seen and a hemispheric asymmetry immediately evident.    Amplitudes are lower in the right hemisphere compared to the left for the   majority of this record.  However, diffuse theta slowing in the 6 to 8 Hz range   is better appreciated in the left hemisphere than the right hemisphere, possibly   due to this attenuation of activity in the right hemisphere.  There is good   page to page variability and movement artifact gradually accelerate throughout   the course of this record.  No epileptiform discharges were seen at any point.    No seizures were seen at any point.  The quality of the record declined   dramatically towards the end of the record.    INTERPRETATION:  Abnormal EEG due to mild diffuse slowing of the overall waking   background with a hemispheric asymmetry as noted above.  In short, accentuated   theta slowing is seen in the left hemisphere and decreased overall amplitude is   seen in the right hemisphere.  No evidence of seizures was seen.      NBB/HN  dd: 12/23/2018 11:37:00 (CST)  td: 12/23/2018 12:04:19 (CST)  Doc ID   #[id number]  Job ID #[id number]    CC:

## 2018-12-24 NOTE — PT/OT/SLP PROGRESS
Speech Language Pathology Treatment    Patient Name:  Selma Alonzo Lux MD   MRN:  [id number]  Admitting Diagnosis: Acute encephalopathy    Recommendations:                 General Recommendations:  Dysphagia therapy  Diet recommendations:  NPO, Liquid Diet Level: NPO   Aspiration Precautions: Strict aspiration precautions   General Precautions: Standard, aspiration  Communication strategies:  none    Subjective     Patient asleep. NG tube now present.     Objective:     Has the patient been evaluated by SLP for swallowing?   Yes  Keep patient NPO? No   Current Respiratory Status: room air      Patient with notable decline cognitively as per previous ST eval. Upon ST entry to room, patient found asleep with wet breath sounds noted. Patient roused given verbal and tactile cues. Cues given for dry swallows and strong coughing in order to clear secretions - minimally beneficial.  Upon attempts to administer thin liquid and puree trials, patient with poor command following, poor oral acceptance despite cues.  Patient to benefit from continued NPO given the above and increased AMS. Skilled education provided re diet recs and ST POC though patient with no evidence of learning.        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      Goals:   Multidisciplinary Problems     SLP Goals        Problem: SLP Goal    Goal Priority Disciplines Outcome   SLP Goal     SLP Unable to achieve outcome(s) by discharge   Description:  Speech Language Pathology Goals  Goals expected to be met by 12/28  1. Pt will tolerate a dental soft diet and thin liquids with no overt signs of airway compromise.                          Plan:     · Patient to be seen:  4 x/week   · Plan of Care expires:  01/21/19  · Plan of Care reviewed with:  patient   · SLP Follow-Up:  Yes       Discharge recommendations:  other (see comments)   Barriers to Discharge:  None    Time Tracking:     SLP Treatment Date:    12/24/18  Speech Start Time:  1150  Speech Stop Time:  1158     Speech Total Time (min):  8 min    Billable Minutes: Treatment Swallowing Dysfunction 8    Emily Abadie, CCC-SLP  12/24/2018

## 2018-12-24 NOTE — H&P
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    Admission H&P reviewed.  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    Hypertension     IBS (irritable bowel syndrome) 6/21/2017    IGT (impaired glucose tolerance) 1/12/2016    Iron deficiency anemia secondary to inadequate dietary iron intake 6/24/2013    Joint pain     Keratoconjunctivitis sicca of both eyes not specified as Sjogren's 7/29/2016    Leiomyoma of uterus, unspecified 9/16/2014    Long QT interval 6/29/2016    Due to medication (plaquenil)     Macular edema 1/12/2015    Multinodular goiter 1/12/2016    Neuropathy 8/23/2017    Plaquenil causing adverse effect in therapeutic use 10/7/2016    Pneumococcal vaccine refused 8/17/2016    Pneumonia due to Streptococcus pneumoniae 4/5/2018    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/2015    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    s/p Right Total knee replacement 5/15/2013    Scoliosis of thoracic spine 5/15/2013    Small vessel disease, cerebrovascular 12/28/2017    Stroke     Vascular dementia 12/6/2017     Past Surgical History:   Procedure Laterality Date    CATARACT EXTRACTION      COLONOSCOPY N/A 9/29/2015    Performed by FIDELINA Sanchez MD at [facility] ENDO (4TH FLR)    EGD (ESOPHAGOGASTRODUODENOSCOPY) N/A 3/11/2014    Performed by Federico Escobar MD at [facility] ENDO (4TH FLR)    EGD  (ESOPHAGOGASTRODUODENOSCOPY) N/A 11/5/2013    Performed by [facility]ico Escobar MD at [facility] ENDO (4TH FLR)    ESOPHAGOGASTRODUODENOSCOPY (EGD) N/A 10/4/2017    Performed by Mg Morton MD at [facility] ENDO    EYE SURGERY      INJECTION-STEROID-EPIDURAL-TRANSFORAMINAL Right 9/20/2017    Performed by Joselin Valdez MD at [facility] PAIN MGT    JOINT REPLACEMENT      right knee    KNEE SURGERY Left 12/31/2013    TKR    left parotidectomy      mixed tumor    SALIVARY GLAND SURGERY      TONSILLECTOMY         Review of Systems:   As documented in primary team H&P    Home Meds:   Prior to Admission medications    Medication Sig Start Date End Date Taking? Authorizing Provider   acetaminophen (TYLENOL) 500 MG tablet Take 1,000 mg by mouth daily as needed for Pain.    Historical Provider, MD   aspirin (ECOTRIN) 81 MG EC tablet Take 81 mg by mouth once daily.    Historical Provider, MD   baclofen (LIORESAL) 10 MG tablet Take 1 tablet (10 mg total) by mouth 3 (three) times daily. 8/20/18   Dee Thomson MD   carvedilol (COREG) 12.5 MG tablet Take 1 tablet (12.5 mg total) by mouth 2 (two) times daily with meals. 10/3/18 10/3/19  Sadia Donovan NP   ciclopirox (PENLAC) 8 % Soln Apply to affected nails qhs. Clean nails with alcohol q7 days. 11/29/18   Teri Ceballos MD   clopidogrel (PLAVIX) 75 mg tablet Take 75 mg by mouth once daily.    Historical Provider, MD   diazePAM (VALIUM) 2 MG tablet Take 1 tablet (2 mg total) by mouth as needed for Anxiety. 11/26/18   Baldomero Giang MD   furosemide (LASIX) 40 MG tablet Take 20 mg by mouth once daily.    Historical Provider, MD   gabapentin (NEURONTIN) 300 MG capsule Take 1 capsule (300 mg total) by mouth 3 (three) times daily. 9/28/18   Dee Thomson MD   hydroxychloroquine (PLAQUENIL) 200 mg tablet Take 2 tablets (400 mg total) by mouth once daily. 10/17/18   Baldomero Francis MD   magnesium oxide (MAG-OX) 400 mg tablet Take 400 mg by mouth once daily.    Historical  MD Caryl   montelukast (SINGULAIR) 10 mg tablet Take 1 tablet (10 mg total) by mouth every evening. 9/28/18   Bhargav Lee MD   omeprazole (PRILOSEC) 20 MG capsule TAKE 1 CAPSULE DAILY 12/6/18   Jessica Bradley MD   potassium chloride SA (K-DUR,KLOR-CON) 10 MEQ tablet Take 2 tablets (20 mEq total) by mouth once daily. 10/17/18   Baldomero Francis MD   predniSONE (DELTASONE) 5 MG tablet Take 1 tablet (5 mg total) by mouth once daily. 12/14/18 1/13/19  Lonnie Rouse MD   sulfamethoxazole-trimethoprim 800-160mg (BACTRIM DS) 800-160 mg Tab Take 1 tablet by mouth once daily. 10/26/18   Sadia Ness MD   sulfaSALAzine (AZULFIDINE) 500 MG TbEC Take 2 tablets (1,000 mg total) by mouth 2 (two) times daily. 12/14/18   Lonnie Rouse MD     Scheduled Meds:    acyclovir  10 mg/kg (Ideal) Intravenous Q12H    carvedilol  12.5 mg Per NG tube BID    ceFEPime (MAXIPIME) IVPB  2 g Intravenous Q24H    methylPREDNISolone sodium succinate  20 mg Intravenous Daily    midazolam  2 mg Intravenous Once    traZODone  25 mg Oral QHS    vancomycin (VANCOCIN) IVPB  1,000 mg Intravenous Q24H     Continuous Infusions:   PRN Meds:acetaminophen, dextrose 50 % in water (D50W), dextrose 50%, dextrose 50%, glucagon (human recombinant), hydrALAZINE, ondansetron, sodium chloride 0.9%  Anticoagulants/Antiplatelets: aspirin and Plavix, Last doses > 5 days ago    Allergies: Review of patient's allergies indicates:  No Known Allergies  Sedation Hx: have not been any systemic reactions    Labs:  No results for input(s): INR in the last 168 hours.    Invalid input(s):  PT,  PTT    Recent Labs   Lab 12/24/18  0346   WBC 13.49*   HGB 13.0   HCT 40.5   MCV 88         Recent Labs   Lab 12/19/18  0300  12/24/18  0346   GLU 94   < > 139*      < > 145   K 3.6   < > 4.0   CL 98   < > 114*   CO2 23   < > 20*   BUN 17   < > 49*   CREATININE 1.1   < > 1.6*   CALCIUM 9.9   < > 9.5   MG 1.7   < > 2.2   ALT 9*   < >  23   AST 13   < > 20   ALBUMIN 3.9   < > 3.1*   BILITOT 1.5*   < > 0.7   BILIDIR 0.5*  --   --     < > = values in this interval not displayed.         Vitals:  Temp: 98.8 °F (37.1 °C) (12/24/18 1100)  Pulse: 101 (12/24/18 1030)  Resp: (!) 31 (12/24/18 1030)  BP: 138/65 (12/24/18 1030)  SpO2: 97 % (12/24/18 1030)     Physical Exam:  ASA: 3  Mallampati: 3    General: no acute distress  Mental Status: Altered  HEENT: normocephalic, atraumatic  Chest: unlabored breathing  Heart: regular heart rate  Abdomen: nondistended  Extremity: moves all extremities    Plan: LP under fluoro guidance    Sedation Plan: Local    Demond Eubanks MD  Radiology, PGY - III  [phone]

## 2018-12-24 NOTE — NURSING
Notified about lab results: K 3.5, mag 2.2, phos 3.1.  I had notified team because patient was having frequent pvcs and a 12-lead was also done.  MD acknowledged and new order is for Kcl 40mEq ngt times one.

## 2018-12-24 NOTE — NURSING
Notified team about urine output still minimal.  BUN/CR have increased slightly.  MD acknowledged and 500cc LR bolus ordered.

## 2018-12-24 NOTE — PLAN OF CARE
Problem: Adult Inpatient Plan of Care  Goal: Plan of Care Review  Outcome: Ongoing (interventions implemented as appropriate)  No acute events throughout day. See vital signs and assessments in flowsheets. VSS, coreg initiated per ngt for better bp control, sinus tach 120s. Plan for midline Monday morning with LP in IR on Wednesday if not able to do on Monday afternoon following midline placement. Patient progressing towards goals as tolerated, plan of care communicated and reviewed with Selma Lux MD, and sister at bedside today. Questions encouraged and all concerns addressed. Will continue to monitor patient.      Neuro: mental status still waxing and waning, with encouragement can answer orientation questions correctly, unaware of what brought her to hospital, began trazadone to control sleep/wake cycle  Pulmonary: 98% on Room air, no c/o sob or hypoxemia, possible concern for airway protection-intubation watch  GI: not appropriate to perform bedside swallow study, frequent wax/wane of mental status, ngt placed, novasource renal (increased BUN/Cr) intiated, aspiration precautions  : parikh in place, UOP 25ish/hr   Skin: nadiya soft wrist and ankle (d/t increased attempts climbing OOB) restraints in use, no injuries noted, skin c/d/i, no breakdown, repositioned frequently  ABX: vanc, acyclovir increased, cefepime. Afebrile, trending WBC

## 2018-12-24 NOTE — PLAN OF CARE
Problem: Adult Inpatient Plan of Care  Goal: Plan of Care Review  Outcome: Ongoing (interventions implemented as appropriate)  POC reviewed with patient and daughter sujey.  Coreg started tonight and blood pressure within parameters.  She is in ST with PVCs.  Potassium replaced and pvcs less frequent.  Patient's neuro status is still waxing and waning but she does move move all extremities purposefully.  Urine output decreased through the night and a 500cc bolus of LR given.  Plan is for an LP to be performed today if not Wednesday and midline is to be placed as well.  Daughter and MDs updated on overnight events.  Please see flowsheet. NAD noted will continue to monitor.

## 2018-12-24 NOTE — PT/OT/SLP PROGRESS
Physical Therapy      Patient Name:  Selma Lux MD   MRN:  [id number]    Patient not seen today secondary to Unavailable (Comment). Pt having line placed.  Will follow-up as appropriate.    Susi Montiel, PT

## 2018-12-24 NOTE — PLAN OF CARE
Problem: SLP Goal  Goal: SLP Goal  Speech Language Pathology Goals  Goals expected to be met by 12/28  1. Pt will tolerate a dental soft diet and thin liquids with no overt signs of airway compromise.         Patient with poor progress toward feeding goals. ST recommending NPO.   Emily P. Abadie M.S., CCC-SLP  Speech Language Pathologist  [phone]  12/24/2018

## 2018-12-24 NOTE — PROGRESS NOTES
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 female with rheumatoid arthritis on steroids, Plaquenil and Sulfasalazine, history of stroke on Aspirin/Plavix, and vascular dementia who presents to the emergency department 12/16/18 with her daughter because of confusion.  History is obtained from the daughter, as the patient is not able to provide a good history.  The daughter mentions that at baseline, the patient is very independent and is very sharp.  However, the day prior to admission the patient was endorsing some blurred vision, having difficulty eating saying that she could not see her plate, and was having unusual behavior-like sitting on her bedroom floor, claiming that someone moved her bed causing her to fall.  She has been having tangential speech requiring frequent redirection.  Additionally, she was complaining of some right-sided leg pain, which is unusual, as she normally complaints of left-sided leg pain.  The patient denies any numbness or tingling in any of her extremities. Of note, the patient ran out of her prednisone 2 days prior to admission.  She has been taking 25 mg daily.     The in the emergency department, she was found to have a blood pressure of 220 on arrival.  Daughter reports that her baseline blood pressure runs low, 110-130. Reports that pt is complaint with her medications.   She received 2 doses of Labetalol 20mg IV x 2 with improvement in her blood pressure to the 180s.  Head CT and MRI in the ED were negative for any acute process.             Hospital/ICU Course:  -12/19/2018 ICU was consulted for altered mental status and  obtundation. Pt's daughter said that since pt admitted to the hospital, she was confused but able to talk. Started in the last 24 hs, pt became more lethargic and obtunded. Pt was evaluated by neurology during this admission and they think her clinical picture is due to hypertensive emergency and usually clinical improvement lag after BP improvement. Also, pt's daughter that pt had some cough when she was drinking juice.  Pt was brought to the ICU for airway watch.  Started empirically with acyclovir, vancomycin and cefepime, pt mental status wax and wean and her blood pressure been labile. All septic work up been negative  -12/20 plan to do LP. cret worsened to 1.6, urine lytes sent.  -12/21 - Called IR to obtain LP.Anesthesia has already tried, and IR will now proceed with procedure. SLP/PT/OT ordered.   -12/22- IR could not get LP yesterday. Patient getting 24 EEG done today. MRI was reexamined with radiology which was not concerning for PRES or temporal enhancement.    -12/23: Spoke to EEG, although official read is not available patient is not in status epilepticus and there is no seizure like activity. Slowing due to metabolic issues.    - 12/24: no issues overnight. Was able to open her eye and speaks couple words but confused. Midline today +/- IR LP    Interval History/Significant Events: no issues overnight. Was able to open her eye and speaks couple words but confused. Midline today +/- IR LP    Review of Systems   Unable to perform ROS: Mental status change      Objective:     Vital Signs (Most Recent):  Temp: 98.8 °F (37.1 °C) (12/24/18 0710)  Pulse: 103 (12/24/18 0710)  Resp: (!) 25 (12/24/18 0710)  BP: (!) 168/77 (12/24/18 0710)  SpO2: 97 % (12/24/18 0710) Vital Signs (24h Range):  Temp:  [97.4 °F (36.3 °C)-99 °F (37.2 °C)] 98.8 °F (37.1 °C)  Pulse:  [] 103  Resp:  [24-39] 25  SpO2:  [96 %-98 %] 97 %  BP: ()/() 168/77   Weight: 72.4 kg (159 lb 9.8 oz)  Body mass index is 28.27  kg/m².      Intake/Output Summary (Last 24 hours) at 12/24/2018 0916  Last data filed at 12/24/2018 0701  Gross per 24 hour   Intake 1290 ml   Output 1453 ml   Net -163 ml       Physical Exam   Constitutional: No distress.   HENT:   Head: Normocephalic and atraumatic.   Eyes: Pupils are equal, round, and reactive to light. Right eye exhibits no discharge. Left eye exhibits no discharge.   Pulmonary/Chest: She has rales.   Abdominal: Soft. She exhibits distension. She exhibits no mass. There is no tenderness. There is no guarding.   Neurological: She displays normal reflexes. She exhibits abnormal muscle tone. Coordination normal.   Opens her eyes. Patient follows simple commands but does not track. PEERLA.      Skin: Skin is warm and dry. She is not diaphoretic.        Lines/Drains/Airways     Drain                 Urethral Catheter 12/19/18 1955 Straight-tip 4 days         NG/OG Tube 12/23/18 1430 [place] sump 14 Fr. Left nostril less than 1 day          Peripheral Intravenous Line                 Peripheral IV - Single Lumen 12/22/18 1040 Posterior;Right Hand 1 day              Significant Labs:    CBC/Anemia Profile:  Recent Labs   Lab 12/23/18  0429 12/24/18  0346   WBC 13.10* 13.49*   HGB 12.8 13.0   HCT 40.3 40.5    172   MCV 87 88   RDW 14.9* 14.8*        Chemistries:  Recent Labs   Lab 12/23/18  0429 12/23/18  1320 12/23/18  2154 12/24/18  0346    145 142 145   K 4.9 3.7 3.5 4.0    111* 110 114*   CO2 18* 21* 21* 20*   BUN 40* 42* 45* 49*   CREATININE 1.4 1.5* 1.6* 1.6*   CALCIUM 8.9 9.5 9.6 9.5   ALBUMIN 3.2*  --   --  3.1*   PROT 6.8  --   --  6.4   BILITOT 0.9  --   --  0.7   ALKPHOS 39*  --   --  45*   ALT 16  --   --  23   AST 29  --   --  20   MG 2.7*  --  2.2 2.2   PHOS 2.7  --  3.1 2.6*       Bilirubin:   Recent Labs   Lab 12/19/18  0300 12/20/18  0310 12/21/18  0412 12/22/18  0439 12/23/18  0429 12/24/18  0346   BILIDIR 0.5*  --   --   --   --   --    BILITOT 1.5* 1.3* 0.9 1.2* 0.9  0.7     BMP:   Recent Labs   Lab 12/24/18  0346   *      K 4.0   *   CO2 20*   BUN 49*   CREATININE 1.6*   CALCIUM 9.5   MG 2.2     CMP:   Recent Labs   Lab 12/23/18  0429 12/23/18  1320 12/23/18  2154 12/24/18  0346    145 142 145   K 4.9 3.7 3.5 4.0    111* 110 114*   CO2 18* 21* 21* 20*   * 144* 120* 139*   BUN 40* 42* 45* 49*   CREATININE 1.4 1.5* 1.6* 1.6*   CALCIUM 8.9 9.5 9.6 9.5   PROT 6.8  --   --  6.4   ALBUMIN 3.2*  --   --  3.1*   BILITOT 0.9  --   --  0.7   ALKPHOS 39*  --   --  45*   AST 29  --   --  20   ALT 16  --   --  23   ANIONGAP 11 13 11 11   EGFRNONAA 35.3* 32.4* 30.0* 30.0*       Significant Imaging:  I have reviewed all pertinent imaging results/findings within the past 24 hours.      ABG  Recent Labs   Lab 12/22/18  1554   PH 7.421   PO2 80   PCO2 31.8*   HCO3 20.7*   BE -4     Assessment/Plan:     Neuro   * Acute encephalopathy    82 y/o F with RA (chronically on steroids, plaquenil and sulfasalazine), history of multiple TIA's (on aspirin and plavix),  And vascular dementia presents to the ED on 12/17/18 with a change in her mentation status with blurry vision (baseline able to perform all ADLS exceptionally) as well as right sided leg pain. The patient denies any numbness or tingling in any of her extremities. Of note, the patient ran out of her prednisone 2 days prior to admission. She has been taking 25 mg daily. On arrival to the ED patient had SBP in the 220 (BL -130). She received 2 doses of Labetalol 20mg IV x 2 with improvement in her blood pressure to the 180s.  Head CT and MRI in the ED were negative for any acute process. EEG 12/19 showed toxic medical encephalopathy without epileptic waves. Repeat MRI does not show any intracranial processes.    Plan  1) IR to obtain LP as we had three failed attempts on the floor. On 12/21 patient did not obtain LP on patient as she lost peripheral access when she went downstairs. Patient mental  status is waxing and waning. Plan for midline today for fluro LP either today or wednesday. As seizures was ruled out with second EEG it is pivotal to rule out infectious cases of encephalopathy to determine prognosis and possible diagnosis.   2) Encephalopathy initially thought to be hypertensive as patient had blurry vision as well as hypertension. CT and MRI negative for stroke. BP control, pt unable to take orally. On IV hydralazine prn for  SBP>180. Reviewed images with radiology again and images do not indicate PRES or temporal lobe enhancement.   3)Patient empirically started on vancomycin, cefepime, and acyclovir. WBC on 12/24 --> 13.49  4) Patient scheduled for trazodone at night to decrease delirium that is mostly present during the night time.      Altered mental status    -- see acute encephalopathy.     Hypertensive encephalopathy    -- see acute encephalopathy.     Dystonia    · Follows with Dr. Giang of Neurology  · Hold Gabapentin and Baclofen  for now given AMS     History of stroke    · Chronic and stable  · Hold Aspirin 81mg PO daily  · Hold Plavix 75mg PO daily >> will hold due to pt is not able to take PO and for possible LP.           Vascular dementia    · Chronic issue  · Very sharp and independent at baseline per family           Psychiatric   Delirium superimposed on dementia    -- see acute encephalopathy.     Cardiac/Vascular   Hypertension, essential    · Reports confusion and blurred vision x 2 days, with  on arrival - reports baseline BP to be 110-130s  · S/p Labetalol 20mg IV x 2 doses with improvement in BP to 180s  · Head CT and MRI negative for acute process. Repeat MRI negative which shows no acute intracranial abnormality.   · Takes Coreg 12.5mg PO BID at home.Restarted medication as patient has NG tube placement.  · Continue hydralazine 5 mg IV prn for target SBP <180 if additional BP medication needs to be ordered.        Renal/   Acute renal failure superimposed on  stage 3 chronic kidney disease    -- baseline Creatine 1.1.  12/20 FC was inserted due to retention. There is an upward trend of creatinine  -- Urine lytes ordered- urine sodium <20, creatinine 174  -- strict in and out. Please  Match I/O  -- avoid nephrotoxic medication. Properly renally dosed antibiotics. If patient's creatinine keeps increasing, this could be due to acyclovir. Give IVF with caution?      Hematology   Thrombocytopenia    · Plt within normal range   · Monitor for bleeding     Endocrine   Poor nutrition    Although SLP has passed the patient to have regular diet with thin liquids, patient is too encephalopathic to swallow. NG tube placed.Recommend Isosource 1.5 @ 40 mL/hr to provide 1440 kcals, 65 g of protein, 733 mL fluid. Hold for residuals >500 mL; additional fluid per MD.         Orthopedic   Seropositive rheumatoid arthritis of multiple sites    Dx 2012 with Dr No, then Kiera  HCQ since 2012  Humira one dose April 2018 followed by ICU admission for pnemonia and resp failure     Other   Long-term use of immunosuppressant medication    Long Term Use of Systolic Steroids  · Follows with Dr. Rouse of Rheumatology  · On Prednisone 25mg PO daily >> switched to methylpred IV 20 mg daily.  · Hold Sulfasalazine 1g PO BID  · Hold Plaquenil 400mg PO daily  · Hold Bactrim for ppx               Critical Care Daily Checklist:     A: Awake: RASS Goal/Actual Goal:    Actual: [place] Agitation Sedation Scale (RASS): (P) Restless   B: Spontaneous Breathing Trial Performed?  NA   C: SAT & SBT Coordinated?  NA                D: Delirium: CAM-ICU Overall CAM-ICU: (P) Positive   E: Early Mobility Performed? No   F: Feeding Goal: Goals: Meet % EEN, EPN  Status: Nutrition Goal Status: new   Current Diet Order  TF        AS: Analgesia/Sedation None   T: Thromboembolic Prophylaxis None   H: HOB > 300 Yes   U: Stress Ulcer Prophylaxis (if needed) None   G: Glucose Control None   B: Bowel Function    I:  Indwelling Catheter (Lines & Hill) Necessity Hill   D: De-escalation of Antimicrobials/Pharmacotherapies None     Plan for the day/ETD Midline placement +/- fluro LP     Code Status:  Family/Goals of Care: Full Code            Critical secondary to Patient has a condition that poses threat to life and bodily function: acute encephalopathy       Critical care was time spent personally by me on the following activities: development of treatment plan with patient or surrogate and bedside caregivers, discussions with consultants, evaluation of patient's response to treatment, examination of patient, ordering and performing treatments and interventions, ordering and review of laboratory studies, ordering and review of radiographic studies, pulse oximetry, re-evaluation of patient's condition. This critical care time did not overlap with that of any other provider or involve time for any procedures.     Rosana Wright MD  Critical Care Medicine  [facility]

## 2018-12-24 NOTE — NURSING
Notified team about patient having very frequent PVCs.  HR bouncing around 115-130.  BP stable after coreg given.  MD to enter new orders.

## 2018-12-24 NOTE — PROGRESS NOTES
"  [facility]  Adult Nutrition  Consult Note    SUMMARY     Recommendations    1. Continue current TF regimen of Novasource @ 30 mL/hr.    - Hold for residuals >500 mL; additional fluid per MD.  2. If able to advance diet, recommend Regular diet (texture per SLP).  3. RD to monitor & follow-up.    Goals: Meet % EEN, EPN  Nutrition Goal Status: goal met  Communication of RD Recs: reviewed with RN    Reason for Assessment    Reason For Assessment: RD follow-up  Diagnosis: other (see comments)(Encephalopathy)  Relevant Medical History: HTN, Dementia  Interdisciplinary Rounds: attended    General Information Comments: Pt remains NPO, tolerating current TF regimen via NGT. Pt non-verbal, history obtained from previous RD notes & chart review. Pt weighed 71.5 kg - 2018. NFPE completed , pt w/ no physical signs of malnutrition. Prior to NPO status, pt on cardiac diet w/ adequate PO intake (per RN documentation).   Nutrition Discharge Planning: Unable to determine    Nutrition/Diet History    Patient Reported Diet/Restrictions/Preferences: other (see comments)(KERRY)  Spiritual, Cultural Beliefs, Muslim Practices, Values that Affect Care: no  Factors Affecting Nutritional Intake: NPO    Anthropometrics    Temp: 98.8 °F (37.1 °C)  Height Method: Stated  Height: 5' 3" (160 cm)  Height (inches): 63 in  Weight Method: Bed Scale  Weight: 72.4 kg (159 lb 9.8 oz)  Weight (lb): 159.61 lb  Ideal Body Weight (IBW), Female: 115 lb  % Ideal Body Weight, Female (lb): 138.79 lb  BMI (Calculated): 28.3  BMI Grade: 25 - 29.9 - overweight  Usual Body Weight (UBW), k.5 kg  % Usual Body Weight: 101.47  % Weight Change From Usual Weight: 1.26 %    Lab/Procedures/Meds    Pertinent Labs Reviewed: reviewed  Pertinent Labs Comments: BUN 49, Creat 1.6, GFR 34.6, Gluc 139  Pertinent Medications Reviewed: reviewed    Estimated/Assessed Needs    Weight Used For Calorie Calculations: 72.4 kg (159 lb 9.8 oz) "     Energy Calorie Requirements (kcal): 1448 kcal/d  Energy Need Method: [place]-St Jeor(1.25 PAL)     Protein Requirements: 72-87 g/d (1-1.2 g/kg)  Weight Used For Protein Calculations: 72.4 kg (159 lb 9.8 oz)     Estimated Fluid Requirement Method: other (see comments)(Per MD or 1 mL/kcal)     CHO Requirement: 50% total kcals    Nutrition Prescription Ordered    Current Diet Order: NPO  Current Nutrition [facility]  Current Nutrition Support Rate Ordered: 30 mL/hr    Evaluation of Received Nutrient/Fluid Intake    Enteral Calories (kcal): 1440  Enteral Protein (gm): 65  Enteral (Free Water) Fluid (mL): 516    % Kcal Needs: 99%  % Protein Needs: 90%    Energy Calories Required: meeting needs  Protein Required: meeting needs  Fluid Required: other (see comments)(Per MD or 1 mL/kcal)    Comments: LBM recorded: 12/17    Tolerance: tolerating    Nutrition Risk    Level of Risk/Frequency of Follow-up: (1x/week)     Assessment and Plan    Nutrition Problem  Inadequate energy intake     Related to (etiology):   Inability to consume sufficient energy     Signs and Symptoms (as evidenced by):   NPO with no alternate means of nutrition      Nutrition Diagnosis Status:   Resolved     Monitor and Evaluation    Food and Nutrient Intake: energy intake, food and beverage intake, enteral nutrition intake  Food and Nutrient Adminstration: diet order, enteral and parenteral nutrition administration  Physical Activity and Function: nutrition-related ADLs and IADLs  Anthropometric Measurements: weight, weight change  Biochemical Data, Medical Tests and Procedures: lipid profile, glucose/endocrine profile, electrolyte and renal panel, gastrointestinal profile, inflammatory profile  Nutrition-Focused Physical Findings: overall appearance     Nutrition Follow-Up    RD Follow-up?: Yes

## 2018-12-25 LAB
ALBUMIN SERPL BCP-MCNC: 2.9 G/DL
ALP SERPL-CCNC: 44 U/L
ALT SERPL W/O P-5'-P-CCNC: 20 U/L
ANION GAP SERPL CALC-SCNC: 9 MMOL/L
ANISOCYTOSIS BLD QL SMEAR: SLIGHT
AST SERPL-CCNC: 17 U/L
BASOPHILS # BLD AUTO: 0.03 K/UL
BASOPHILS NFR BLD: 0.2 %
BILIRUB SERPL-MCNC: 0.5 MG/DL
BUN SERPL-MCNC: 49 MG/DL
BURR CELLS BLD QL SMEAR: ABNORMAL
CALCIUM SERPL-MCNC: 9.3 MG/DL
CHLORIDE SERPL-SCNC: 113 MMOL/L
CO2 SERPL-SCNC: 23 MMOL/L
CREAT SERPL-MCNC: 1.5 MG/DL
DIFFERENTIAL METHOD: ABNORMAL
EOSINOPHIL # BLD AUTO: 0.2 K/UL
EOSINOPHIL NFR BLD: 1.2 %
ERYTHROCYTE [DISTWIDTH] IN BLOOD BY AUTOMATED COUNT: 14.9 %
EST. GFR  (AFRICAN AMERICAN): 37.4 ML/MIN/1.73 M^2
EST. GFR  (NON AFRICAN AMERICAN): 32.4 ML/MIN/1.73 M^2
GLUCOSE SERPL-MCNC: 142 MG/DL
HCT VFR BLD AUTO: 40 %
HGB BLD-MCNC: 11.9 G/DL
IMM GRANULOCYTES # BLD AUTO: 0.11 K/UL
IMM GRANULOCYTES NFR BLD AUTO: 0.8 %
LYMPHOCYTES # BLD AUTO: 1.9 K/UL
LYMPHOCYTES NFR BLD: 14.5 %
MAGNESIUM SERPL-MCNC: 2.3 MG/DL
MCH RBC QN AUTO: 27.7 PG
MCHC RBC AUTO-ENTMCNC: 29.8 G/DL
MCV RBC AUTO: 93 FL
MONOCYTES # BLD AUTO: 2.3 K/UL
MONOCYTES NFR BLD: 17.8 %
NEUTROPHILS # BLD AUTO: 8.6 K/UL
NEUTROPHILS NFR BLD: 65.5 %
NRBC BLD-RTO: 0 /100 WBC
PHOSPHATE SERPL-MCNC: 2.8 MG/DL
PLATELET # BLD AUTO: 176 K/UL
PMV BLD AUTO: 10.6 FL
POIKILOCYTOSIS BLD QL SMEAR: SLIGHT
POTASSIUM SERPL-SCNC: 3.7 MMOL/L
PROT SERPL-MCNC: 6.1 G/DL
RBC # BLD AUTO: 4.29 M/UL
SODIUM SERPL-SCNC: 145 MMOL/L
WBC # BLD AUTO: 13.14 K/UL

## 2018-12-25 PROCEDURE — 63600175 PHARM REV CODE 636 W HCPCS: Performed by: STUDENT IN AN ORGANIZED HEALTH CARE EDUCATION/TRAINING PROGRAM

## 2018-12-25 PROCEDURE — 99233 PR SUBSEQUENT HOSPITAL CARE,LEVL III: ICD-10-PCS | Mod: GC,,, | Performed by: INTERNAL MEDICINE

## 2018-12-25 PROCEDURE — 80053 COMPREHEN METABOLIC PANEL: CPT

## 2018-12-25 PROCEDURE — 99233 PR SUBSEQUENT HOSPITAL CARE,LEVL III: ICD-10-PCS | Mod: GC,,, | Performed by: PSYCHIATRY & NEUROLOGY

## 2018-12-25 PROCEDURE — 97166 OT EVAL MOD COMPLEX 45 MIN: CPT

## 2018-12-25 PROCEDURE — 63600175 PHARM REV CODE 636 W HCPCS: Performed by: INTERNAL MEDICINE

## 2018-12-25 PROCEDURE — 83735 ASSAY OF MAGNESIUM: CPT

## 2018-12-25 PROCEDURE — 20000000 HC ICU ROOM

## 2018-12-25 PROCEDURE — 85025 COMPLETE CBC W/AUTO DIFF WBC: CPT

## 2018-12-25 PROCEDURE — 25000003 PHARM REV CODE 250: Performed by: STUDENT IN AN ORGANIZED HEALTH CARE EDUCATION/TRAINING PROGRAM

## 2018-12-25 PROCEDURE — 99233 SBSQ HOSP IP/OBS HIGH 50: CPT | Mod: GC,,, | Performed by: PSYCHIATRY & NEUROLOGY

## 2018-12-25 PROCEDURE — 84100 ASSAY OF PHOSPHORUS: CPT

## 2018-12-25 PROCEDURE — 94761 N-INVAS EAR/PLS OXIMETRY MLT: CPT

## 2018-12-25 PROCEDURE — 97530 THERAPEUTIC ACTIVITIES: CPT

## 2018-12-25 PROCEDURE — 99233 SBSQ HOSP IP/OBS HIGH 50: CPT | Mod: GC,,, | Performed by: INTERNAL MEDICINE

## 2018-12-25 PROCEDURE — 25000003 PHARM REV CODE 250: Performed by: INTERNAL MEDICINE

## 2018-12-25 RX ORDER — NAPROXEN SODIUM 220 MG/1
81 TABLET, FILM COATED ORAL DAILY
Status: DISCONTINUED | OUTPATIENT
Start: 2018-12-25 | End: 2018-12-31 | Stop reason: HOSPADM

## 2018-12-25 RX ORDER — AMOXICILLIN 250 MG
1 CAPSULE ORAL DAILY
Status: DISCONTINUED | OUTPATIENT
Start: 2018-12-25 | End: 2018-12-26

## 2018-12-25 RX ORDER — HEPARIN SODIUM 5000 [USP'U]/ML
5000 INJECTION, SOLUTION INTRAVENOUS; SUBCUTANEOUS EVERY 8 HOURS
Status: DISCONTINUED | OUTPATIENT
Start: 2018-12-25 | End: 2018-12-31 | Stop reason: HOSPADM

## 2018-12-25 RX ORDER — CLOPIDOGREL BISULFATE 75 MG/1
75 TABLET ORAL DAILY
Status: DISCONTINUED | OUTPATIENT
Start: 2018-12-25 | End: 2018-12-31 | Stop reason: HOSPADM

## 2018-12-25 RX ORDER — NAPROXEN SODIUM 220 MG/1
81 TABLET, FILM COATED ORAL DAILY
Status: DISCONTINUED | OUTPATIENT
Start: 2018-12-25 | End: 2018-12-25

## 2018-12-25 RX ADMIN — STANDARDIZED SENNA CONCENTRATE AND DOCUSATE SODIUM 1 TABLET: 8.6; 5 TABLET, FILM COATED ORAL at 08:12

## 2018-12-25 RX ADMIN — METHYLPREDNISOLONE SODIUM SUCCINATE 20 MG: 40 INJECTION, POWDER, FOR SOLUTION INTRAMUSCULAR; INTRAVENOUS at 08:12

## 2018-12-25 RX ADMIN — SODIUM CHLORIDE 500 ML: 0.9 INJECTION, SOLUTION INTRAVENOUS at 05:12

## 2018-12-25 RX ADMIN — SODIUM CHLORIDE 500 ML: 0.9 INJECTION, SOLUTION INTRAVENOUS at 11:12

## 2018-12-25 RX ADMIN — HEPARIN SODIUM 5000 UNITS: 5000 INJECTION, SOLUTION INTRAVENOUS; SUBCUTANEOUS at 03:12

## 2018-12-25 RX ADMIN — ACYCLOVIR SODIUM 520 MG: 50 INJECTION, SOLUTION INTRAVENOUS at 09:12

## 2018-12-25 RX ADMIN — CLOPIDOGREL 75 MG: 75 TABLET, FILM COATED ORAL at 12:12

## 2018-12-25 RX ADMIN — CEFEPIME 2 G: 2 INJECTION, POWDER, FOR SOLUTION INTRAVENOUS at 12:12

## 2018-12-25 RX ADMIN — ASPIRIN 81 MG CHEWABLE TABLET 81 MG: 81 TABLET CHEWABLE at 12:12

## 2018-12-25 RX ADMIN — HEPARIN SODIUM 5000 UNITS: 5000 INJECTION, SOLUTION INTRAVENOUS; SUBCUTANEOUS at 09:12

## 2018-12-25 RX ADMIN — ACYCLOVIR SODIUM 520 MG: 50 INJECTION, SOLUTION INTRAVENOUS at 08:12

## 2018-12-25 NOTE — NURSING
1535: pt transported to IR for LP per celia walker and celia santana. ngt clamped (tube feeding has been held for 2 hours at this time) and IV's saline locked at this time, pt on room air. Once in IR, patient placed proned on fluroscopy table, 4 point restraints still intact. Patient positioned to allow maximum comfort during procedure. Patient sedated with versed 2mg ivp, left arm midline c/d/i. Patient prepped per MD for LP procedure and tolerated procedure without any complications. Continuous telemetry, BP, and pulse ox monitored throughout procedure. Vitals remained stable before, during, and after procedure. Procedure site cleaned and dressed with bandaid. Patient then placed supine on ICU bed, 4 point restraints still intact, patient beginning to wake up from sedation.     1705: patient transported back to room 6074 per celia walker and celia santana. Patient respositioned, SCDs connected, tube feeding resumed at previous rate, IVs remain c/d/i, and VSS. LP site without complications, will continue to monitor patient

## 2018-12-25 NOTE — NURSING
Occupational therapy at bedside to evaluate patient for therapy today. Patient very drowsy this am, responds to vigourous/repeated stimulation. Versed was given late yesterday afternoon for LP and trazadone was given later in the night at 11pm per daughter's request with patient finally falling asleep around 2am. OT assisted patient to sitting edge of bed, no trunk or head control noted from patient at this time, responds to pain and repeated stimulation. I sat on bed next to patient to help hold her steady for about 5 minutes before the OT and myself assisted patient back to bed. ICU bed then placed in chair position and patient wedge in upright position. Rolled towel needed to help hold patient's head up at this time d/t drowsiness. Will continue to monitor patient and if more alert this afternoon, will attempt edge of bed again.

## 2018-12-25 NOTE — ASSESSMENT & PLAN NOTE
Initial presentation of blurry vision, consistent w/ HTN encephalopathy. Patient waxed and waned from baseline to more confusing during her stay in the observation unit. Was more somnolent on 12/18 and oral medication was stopped 2/2 aspiration risk and some difficulty w/ BP control w/ IV meds due to availability of medication as well as observation status. Had worsening mentation throughout the day and developed a fever ( Tmax 102.6), tachycardia, increased RR and persistently elevated BP.  She was transferred to MICU for increased level of care overnight on 12/19.    Somnolent this AM 2/2 Trazadone administration late last night. Overall improving, per nursing staff w/ normalizing sleep-wake cycles.     - MRI- no intracranial acute pathology   - LP obtained by IR   - Tube 4 - WBC 0, RBC 2, Prot 102 ( elevated) Glucose 106 ( serum at the time likely around 140s, no exact read to correlate w/ the LP, so about 2/3rds of serum glucose)   - No evidence of bacterial infection   - PCR studied pending   - Likely prot elevation 2/2 viral encephalitis in this immunocompromised patient; continue acyclovir for possible HSV and general supportive care   - Would recommend continuous EEG placement after the above studies completed for evaluation of episodic witnessed LUE/LLE rhythmic jerking to r/o seizure   - EEG w/ previous R hemispheric slowing    - Latest EEG w/ some L hemispheric slowing, 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

## 2018-12-25 NOTE — PLAN OF CARE
Problem: Adult Inpatient Plan of Care  Goal: Plan of Care Review  Outcome: Ongoing (interventions implemented as appropriate)  No acute events throughout day. See vital signs and assessments in flowsheets. VSS, -150s, NSR 90s. Midline placed and LP successful. Patient progressing towards goals as tolerated, plan of care communicated and reviewed with Selma Lux MD, and sister at bedside today. Questions encouraged and all concerns addressed. Will continue to monitor patient.

## 2018-12-25 NOTE — PLAN OF CARE
Problem: Adult Inpatient Plan of Care  Goal: Plan of Care Review  No acute events throughout day. Patient unable to participate in therapy this am, but once more alert this afternoon patient was able to transfer to bedside chair. See vital signs and assessments in flowsheets. Episode of slight hypotension sbp 80s, 500cc bolus given, coreg and trazadone discontinued. Patient progressing towards goals as tolerated, plan of care communicated and reviewed with Selma Lux MD, daughter sujey, and Dr Wright at bedside today. Questions encouraged and all concerns addressed. Will continue to monitor patient.      Neuro: mental status improved since trazadone started, although trazadone was given late last night and patient slept til after 12 noon today, speech clearer, following commands appropriately, AAO x3,     Pulmonary: 98% on Room air, no c/o sob or hypoxemia    GI: ngt, novasource renal at goal 30cc/hr, aspiration precautions, may be able to attempt swallow study tomorrow if mentation continues to improve    : parikh in place, UOP 30/hr    Skin: nadiya ankle restraints removed this am, nadiya soft wrist restraints removed this afternoon (mentation improved), no injuries noted, skin c/d/i, no breakdown, repositioned frequently    ABX: Afebrile, trending WBC, cell count in CSF not consistent with bacterial meningitis, vanc discontinued, continue cefepime and acyclovir    VTE: LP complete, heparin SQ q8hrs, scd's, plavix and aspirin resumed

## 2018-12-25 NOTE — SUBJECTIVE & OBJECTIVE
Subjective:     Interval History: Patient somnolent and hard to awake. Received Trazodone at 11;00 PM the night prior. Per nursing staff, patient w/ normalizing sleep-wake cycle and is over all doing better.   Hypotensive overnight requiring IVF bolus.    Current Neurological Medications:     Current Facility-Administered Medications   Medication Dose Route Frequency Provider Last Rate Last Dose    acetaminophen suppository 650 mg  650 mg Rectal Q6H PRN Kristen Correa MD   650 mg at 12/20/18 0828    acyclovir (ZOVIRAX) 520 mg in dextrose 5 % 100 mL IVPB  10 mg/kg (Ideal) Intravenous Q12H Jeremiah Barajas  mL/hr at 12/25/18 0846 520 mg at 12/25/18 0846    aspirin chewable tablet 81 mg  81 mg Per NG tube Daily Karyn Balbuena MD   81 mg at 12/25/18 1201    ceFEPIme injection 2 g  2 g Intravenous Q24H Dayan Martinez MD   2 g at 12/25/18 1201    clopidogrel tablet 75 mg  75 mg Per NG tube Daily Karyn Balbuena MD   75 mg at 12/25/18 1201    dextrose 50 % in water (D50W) injection 25 g  25 g Intravenous PRN Karen Toledo MD        dextrose 50% injection 12.5 g  12.5 g Intravenous PRN Karen Toledo MD        dextrose 50% injection 12.5 g  12.5 g Intravenous PRN MERARY Anguiano MD        glucagon (human recombinant) injection 1 mg  1 mg Intramuscular PRN Karen Toledo MD        heparin (porcine) injection 5,000 Units  5,000 Units Subcutaneous Q8H [facility] FIDELINA Monte MD        hydrALAZINE injection 5 mg  5 mg Intravenous Q4H PRN Rogelio Mijares MD   5 mg at 12/22/18 1741    methylPREDNISolone sodium succinate injection 20 mg  20 mg Intravenous Daily Rogeloi Mijares MD   20 mg at 12/25/18 0845    ondansetron injection 4 mg  4 mg Intravenous Q6H PRN Shelli Bernstein NP   4 mg at 12/18/18 1845    senna-docusate 8.6-50 mg per tablet 1 tablet  1 tablet Oral Daily Rosana Wright MD   1 tablet at 12/25/18 0844    sodium chloride 0.9% flush 5 mL  5 mL  Intravenous PRN Karen Toledo MD           Review of Systems   Constitutional: Positive for activity change, fatigue and fever.   HENT: Negative.    Gastrointestinal: Negative for nausea and vomiting.   Endocrine: Negative.    Genitourinary: Negative.    Skin: Negative.    Allergic/Immunologic: Negative.    Neurological: Positive for weakness.   Psychiatric/Behavioral: Positive for agitation and confusion.     Objective:     Vital Signs (Most Recent):  Temp: 98 °F (36.7 °C) (12/25/18 1130)  Pulse: 86 (12/25/18 1300)  Resp: (!) 22 (12/25/18 1300)  BP: (!) 86/50 (12/25/18 1300)  SpO2: 97 % (12/25/18 1300) Vital Signs (24h Range):  Temp:  [97.6 °F (36.4 °C)-98.8 °F (37.1 °C)] 98 °F (36.7 °C)  Pulse:  [] 86  Resp:  [16-40] 22  SpO2:  [95 %-100 %] 97 %  BP: ()/(43-79) 86/50     Weight: 72.4 kg (159 lb 9.8 oz)  Body mass index is 28.27 kg/m².    Physical Exam  General:  Well-developed, well-nourished, somnolent, hard to awake  HEENT:  NCAT, PERRLA, oropharyngeal membranes non-erythematous/without exudate, NG tube in place  Neck:  Supple, normal ROM without nuchal rigidity  Resp:  Symmetric expansion, no increased wob  CVS:  No LE edema, peripheral pulses 2+ (radial, dorsalis pedis)  GI:  Abd soft, non-distended, non-tender to palpation  Neurologic Exam:  Mental Status:  Somnolent, hard to awaken 2/2 Trazadone administration late last night  Cranial Nerves:  PERRLA, EOMI, no deficits noted.   Motor:  Normal bulk and tone. Moves extremities spontaneoulsy  Sensory: Noxious stimuli only, unable to assess light touch   Reflexes: R Biceps, brachioradialis, patellar, Achilles 1+.    L Biceps, brachioradialis 3+, patellar, Achilles 2+  Coordination: Does not follow directions well.  Gait: Deferred       Significant Labs:   Hemoglobin A1c: No results for input(s): HGBA1C in the last 720 hours.  Blood Culture:   No results for input(s): LABSILVINAOO in the last 48 hours.  BMP:   Recent Labs   Lab 12/23/18  4006  12/24/18  0346 12/25/18  0433   * 139* 142*    145 145   K 3.5 4.0 3.7    114* 113*   CO2 21* 20* 23   BUN 45* 49* 49*   CREATININE 1.6* 1.6* 1.5*   CALCIUM 9.6 9.5 9.3   MG 2.2 2.2 2.3     CBC:   Recent Labs   Lab 12/24/18  0346 12/25/18  0433   WBC 13.49* 13.14*   HGB 13.0 11.9*   HCT 40.5 40.0    176     CMP:   Recent Labs   Lab 12/23/18  2154 12/24/18  0346 12/25/18  0433   * 139* 142*    145 145   K 3.5 4.0 3.7    114* 113*   CO2 21* 20* 23   BUN 45* 49* 49*   CREATININE 1.6* 1.6* 1.5*   CALCIUM 9.6 9.5 9.3   MG 2.2 2.2 2.3   PROT  --  6.4 6.1   ALBUMIN  --  3.1* 2.9*   BILITOT  --  0.7 0.5   ALKPHOS  --  45* 44*   AST  --  20 17   ALT  --  23 20   ANIONGAP 11 11 9   EGFRNONAA 30.0* 30.0* 32.4*     CSF Culture:   Recent Labs   Lab 12/24/18  1640   CSFCULTURE No Growth to date     CSF Studies:   Recent Labs   Lab 12/24/18  1640   ALIQUT 3.0  3.0   APPEARCSF Clear  Clear   COLORCSF Colorless  Colorless   CSFWBC 2  0   CSFRBC 22*  2*   GLUCCSF 106*   PROTEINCSF 102*     Inflammatory Markers:   No results for input(s): SEDRATE, CRP, PROCAL in the last 48 hours.  POCT Glucose:   No results for input(s): POCTGLUCOSE in the last 24 hours.  Prealbumin: No results for input(s): PREALBUMIN in the last 48 hours.  Respiratory Culture: No results for input(s): GSRESP, RESPIRATORYC in the last 48 hours.  Urine Culture: No results for input(s): LABURIN in the last 48 hours.  Urine Studies:   No results for input(s): COLORU, APPEARANCEUA, PHUR, SPECGRAV, PROTEINUA, GLUCUA, KETONESU, BILIRUBINUA, OCCULTUA, NITRITE, UROBILINOGEN, LEUKOCYTESUR, RBCUA, WBCUA, BACTERIA, SQUAMEPITHEL, HYALINECASTS in the last 48 hours.    Invalid input(s): LATESHA  All pertinent lab results from the past 24 hours have been reviewed.    Significant Imaging: I have reviewed all pertinent imaging results/findings within the past 24 hours.   EEG  There is evidence for R hemispheric cortical dysfunction  consistent with a structural lesion and moderate generalized encephalopathy possibly due to toxic/metabolic etiology or medication effect. No seizures were recorded during this study.

## 2018-12-25 NOTE — PT/OT/SLP EVAL
Occupational Therapy   Evaluation & Treatment Session    Name: Selma Lux MD  MRN: [id number]  Admitting Diagnosis:  Acute encephalopathy      Recommendations:     Discharge Recommendations: nursing facility, skilled  Discharge Equipment Recommendations:  (TBD pending further mobility )  Barriers to discharge:  Decreased caregiver support(at current functional level)    History:     Occupational Profile: *Hx and PLOF obtained from daughter in room as pt somnolent*  Living Environment: Pt lives alone, however pt's daughters have been staying with her. She lives in a H with 5STE and b/l handrails. Bathroom has a tub.   Previous level of function: PTA, pt was independent with ADLs, was requiring assist to transfer out of tub, and was utilizing a SPC for all mobility.   Roles and Routines: Pt is a mother.   Equipment Used at Home:  bedside commode, walker, rolling, wheelchair, cane, straight  Assistance upon Discharge: Pt's daughter are able to assist as needed.     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    Hypertension     IBS (irritable bowel syndrome) 6/21/2017    IGT (impaired glucose tolerance) 1/12/2016    Iron deficiency anemia secondary to inadequate dietary iron intake 6/24/2013    Joint pain     Keratoconjunctivitis sicca of both eyes not specified as Sjogren's 7/29/2016    Leiomyoma of uterus, unspecified 9/16/2014    Long QT interval 6/29/2016    Due to medication (plaquenil)     Macular edema 1/12/2015    Multinodular  goiter 1/12/2016    Neuropathy 8/23/2017    Plaquenil causing adverse effect in therapeutic use 10/7/2016    Pneumococcal vaccine refused 8/17/2016    Pneumonia due to Streptococcus pneumoniae 4/5/2018    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/2015    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    s/p Right Total knee replacement 5/15/2013    Scoliosis of thoracic spine 5/15/2013    Small vessel disease, cerebrovascular 12/28/2017    Stroke     Vascular dementia 12/6/2017       Past Surgical History:   Procedure Laterality Date    CATARACT EXTRACTION      COLONOSCOPY N/A 9/29/2015    Performed by [facility]ELINA Sanchez [facility] at [facility] ENDO (4TH FLR)    EGD (ESOPHAGOGASTRODUODENOSCOPY) N/A 3/11/2014    Performed by Federico Escobar MD at [facility] ENDO (4TH FLR)    EGD (ESOPHAGOGASTRODUODENOSCOPY) N/A 11/5/2013    Performed by Fed[facility]ico [facility]obar MD at [facility] ENDO (4TH FLR)    ESOPHAGOGASTRODUODENOSCOPY (EGD) N/A 10/4/2017    [facility] Mg [facility] at [facility]    EYE SURGERY      INJECTION-STEROID-EPIDURAL-TRANSFORAMINAL Right 9/20/2017    [facility] PAIN MGT    JOINT REPLACEMENT      right knee    KNEE SURGERY Left 12/31/2013    TKR    left parotidectomy      mixed tumor    SALIVARY GLAND SURGERY      TONSILLECTOMY         Subjective     Chief Complaint: none reported   Patient/Family Comments/goals: return to PLOF    Pain/Comfort:  · Pain Rating 1: (unable to obtain, how no signs of pain present throughout session)  · Pain Rating Post-Intervention 1: (unable to obtain, how no signs of pain present throughout session)    Patients cultural, spiritual, Caodaism conflicts given the current situation: no    Objective:     Communicated with: RN prior to session.  Patient found with: telemetry, pulse ox (continuous), blood pressure cuff, parikh catheter, NG tube, peripheral IV upon OT entry to room.    General Precautions: Standard, fall, aspiration   Orthopedic  Precautions:N/A   Braces: N/A     Occupational Performance:    Bed Mobility:    · Supine>sit: total A   · Scooting: total A   · Sit>supine: total A     Functional Mobility/Transfers:  · Not appropriate at this time 2/2 pt remaining somnolent throughout session     Activities of Daily Living:  · Grooming: total A   · Wiping face with washcloth   · LB Dressing: total A   · To don socks while seated at EOB    Cognitive/Visual Perceptual:  · Pt somnolent throughout session with eyes remaining closed. Pt able to be aroused via sternal rub, however unable to maintain attention. Pt not following commands or engaging in session.     Physical Exam:  · Unable to formally assess BUE ROM, sensation, or MMT as pt somnolent throughout session  · Forward flexed posture seated at EOB with shoulders rounded, posteiror pelvic tilt, and thoracic felxion    AMPAC 6 Click ADL:  AMPAC Total Score: 6    Treatment & Education:  · Communicated OT POC  · Updated communication board  · Educated on importance of bed in chair position, maximizing independence with ADLs, and continued engagement with therapy  · Daughter present during session  · Pt seated at EOB x8 minutes with max-total A for sitting balance with intermittent moments of eyes open, however with pt mostly somnolent and unable to engage in therapy session   · Max tactile cues for anterior pelvic tilt and thoracic extension  · Education:  · VSS stable throughout and RN present  · RN educated on pt only being appropraite for bed in chair position and cardiac chair t/f (once more alert) at this time   · Bed positioned in chair position for optimal lung expansion and optimization upright sitting tolerance     Patient left with bed in chair position with all lines intact, call button in reach, RN notified and daughter present    Assessment:     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  She presents with the following performance  "deficits affecting function: impaired endurance, weakness, impaired self care skills, impaired functional mobilty, impaired balance, decreased upper extremity function, decreased lower extremity function, decreased safety awareness, impaired cardiopulmonary response to activity.  Evaluation limited 2/2 pt somnolent. Pt requiring total A for all functional mobility and ADLs at this time. Pt would benefit from SNF at d/c to maximize independence with ADLs/mobility.     Rehab Prognosis: Fair; patient would benefit from acute skilled OT services to address these deficits and reach maximum level of function.         Clinical Decision Makin.  OT Mod:  "Pt evaluation falls under moderate complexity for evaluation coding due to identification of 3-5 performance deficits noted as stated above. Eval required Min/Mod assistance to complete on this date and detailed assessment(s) were utilized. Moreover, an expanded review of history and occupational profile obtained with additional review of cognitive, physical and psychosocial hx."     Plan:     Patient to be seen 3 x/week to address the above listed problems via self-care/home management, therapeutic activities, neuromuscular re-education, therapeutic exercises  · Plan of Care Expires: 18  · Plan of Care Reviewed with: patient, daughter    This Plan of care has been discussed with the patient who was involved in its development and understands and is in agreement with the identified goals and treatment plan    GOALS:   Multidisciplinary Problems     Occupational Therapy Goals        Problem: Occupational Therapy Goal    Goal Priority Disciplines Outcome Interventions   Occupational Therapy Goal     OT, PT/OT Ongoing (interventions implemented as appropriate)    Description:  Goals to be met by: 2018    Pt will tolerate sitting at EOB x8 minutes with min A in prep for seated grooming.  Pt will complete seated grooming with min A.  Pt will complete supine " with HOB flat to seated at EOB with mod A in prep for seated grooming.                    Time Tracking:     OT Date of Treatment: 12/25/18  OT Start Time: 0845  OT Stop Time: 0906  OT Total Time (min): 21 min    Billable Minutes:Evaluation 13  Therapeutic Activity 8    Lissa Laureano OT  12/25/2018

## 2018-12-25 NOTE — PLAN OF CARE
Problem: Occupational Therapy Goal  Goal: Occupational Therapy Goal  Goals to be met by: 1/8/2018    Pt will tolerate sitting at EOB x8 minutes with min A in prep for seated grooming.  Pt will complete seated grooming with min A.  Pt will complete supine with HOB flat to seated at EOB with mod A in prep for seated grooming.  Outcome: Ongoing (interventions implemented as appropriate)  OT evaluation completed and POC initiated 12/25/2018. Pt would benefit from continued skilled acute OT services at this time to maximize independence with functional mobility, functional transfers, and ADLs. Pt would benefit from SNF at d/c in order to ensure safe return to PLOF and the least restrictive environment.

## 2018-12-25 NOTE — PROGRESS NOTES
[facility]  Critical Care Medicine  Progress Note    Patient Name: Selma Alonzo Lux MD  MRN: [id number]  Admission Date: 12/16/2018  Hospital Length of Stay: 6 days  Code Status: Full Code  Attending Provider: Jeremiah Barajas MD  Primary Care Provider: Bhargav Hirsch MD   Principal Problem: Acute encephalopathy    Subjective:     HPI:  Ms. Hahn Alonzo Lux MD is a 83 y.o. female with rheumatoid arthritis on steroids, Plaquenil and Sulfasalazine, history of stroke on Aspirin/Plavix, and vascular dementia who presents to the emergency department 12/16/18 with her daughter because of confusion.  History is obtained from the daughter, as the patient is not able to provide a good history.  The daughter mentions that at baseline, the patient is very independent and is very sharp.  However, the day prior to admission the patient was endorsing some blurred vision, having difficulty eating saying that she could not see her plate, and was having unusual behavior-like sitting on her bedroom floor, claiming that someone moved her bed causing her to fall.  She has been having tangential speech requiring frequent redirection.  Additionally, she was complaining of some right-sided leg pain, which is unusual, as she normally complaints of left-sided leg pain.  The patient denies any numbness or tingling in any of her extremities. Of note, the patient ran out of her prednisone 2 days prior to admission.  She has been taking 25 mg daily.     The in the emergency department, she was found to have a blood pressure of 220 on arrival.  Daughter reports that her baseline blood pressure runs low, 110-130. Reports that pt is complaint with her medications.   She received 2 doses of Labetalol 20mg IV x 2 with improvement in her blood pressure to the 180s.  Head CT and MRI in the ED were negative for any acute process.             Hospital/ICU Course:  -12/19/2018 ICU was consulted for altered mental status and  obtundation. Pt's daughter said that since pt admitted to the hospital, she was confused but able to talk. Started in the last 24 hs, pt became more lethargic and obtunded. Pt was evaluated by neurology during this admission and they think her clinical picture is due to hypertensive emergency and usually clinical improvement lag after BP improvement. Also, pt's daughter that pt had some cough when she was drinking juice.  Pt was brought to the ICU for airway watch.  Started empirically with acyclovir, vancomycin and cefepime, pt mental status wax and wean and her blood pressure been labile. All septic work up been negative  -12/20 plan to do LP. cret worsened to 1.6, urine lytes sent.  -12/21 - Called IR to obtain LP.Anesthesia has already tried, and IR will now proceed with procedure. SLP/PT/OT ordered.   -12/22- IR could not get LP yesterday. Patient getting 24 EEG done today. MRI was reexamined with radiology which was not concerning for PRES or temporal enhancement.    -12/23: Spoke to EEG, although official read is not available patient is not in status epilepticus and there is no seizure like activity. Slowing due to metabolic issues.    - 12/24: no issues overnight. Was able to open her eye and speaks couple words but confused. Midline today +/- IR LP  - 12/25: LP done (12/24) CSF negative for bacterial infection, couple viral CSF PCR still in process. Had one episode of hypotension to SBP 80s responded well to IVF     Interval History/Significant Events:  LP done (12/24) CSF negative for bacterial infection, couple viral CSF PCR still in process. Had one episode of hypotension to SBP 80s responded well to IVF     Review of Systems   Unable to perform ROS: Mental status change      Objective:     Vital Signs (Most Recent):  Temp: 98.8 °F (37.1 °C) (12/25/18 0301)  Pulse: 80 (12/25/18 0600)  Resp: 20 (12/25/18 0600)  BP: (!) 110/53 (12/25/18 0600)  SpO2: 98 % (12/25/18 0600) Vital Signs (24h Range):  Temp:   [97.8 °F (36.6 °C)-98.8 °F (37.1 °C)] 98.8 °F (37.1 °C)  Pulse:  [] 80  Resp:  [17-38] 20  SpO2:  [95 %-100 %] 98 %  BP: ()/(43-94) 110/53   Weight: 72.4 kg (159 lb 9.8 oz)  Body mass index is 28.27 kg/m².      Intake/Output Summary (Last 24 hours) at 12/25/2018 0845  Last data filed at 12/25/2018 0600  Gross per 24 hour   Intake 1345 ml   Output 734 ml   Net 611 ml       Physical Exam   Constitutional: No distress.   HENT:   Head: Normocephalic and atraumatic.   Eyes: Pupils are equal, round, and reactive to light. Right eye exhibits no discharge. Left eye exhibits no discharge.   Pulmonary/Chest: She has rales.   Abdominal: Soft. She exhibits distension. She exhibits no mass. There is no tenderness. There is no guarding.   Neurological: She displays normal reflexes. She exhibits abnormal muscle tone. Coordination normal.   Opens her eyes. Patient follows simple commands but does not track. PEERLA.      Skin: Skin is warm and dry. She is not diaphoretic.        Lines/Drains/Airways     Drain                 Urethral Catheter 12/19/18 1955 Straight-tip 5 days         NG/OG Tube 12/23/18 1430 [place] sump 14 Fr. Left nostril 1 day          Peripheral Intravenous Line                 Peripheral IV - Single Lumen 12/22/18 1040 Posterior;Right Hand 2 days         Midline Catheter Insertion/Assessment  - Single Lumen 12/24/18 1240 Left basilic vein (medial side of arm) 18g x 10cm less than 1 day              Significant Labs:    CBC/Anemia Profile:  Recent Labs   Lab 12/24/18  0346 12/25/18  0433   WBC 13.49* 13.14*   HGB 13.0 11.9*   HCT 40.5 40.0    176   MCV 88 93   RDW 14.8* 14.9*        Chemistries:  Recent Labs   Lab 12/23/18  2154 12/24/18  0346 12/25/18  0433    145 145   K 3.5 4.0 3.7    114* 113*   CO2 21* 20* 23   BUN 45* 49* 49*   CREATININE 1.6* 1.6* 1.5*   CALCIUM 9.6 9.5 9.3   ALBUMIN  --  3.1* 2.9*   PROT  --  6.4 6.1   BILITOT  --  0.7 0.5   ALKPHOS  --  45* 44*   ALT  --  23  20   AST  --  20 17   MG 2.2 2.2 2.3   PHOS 3.1 2.6* 2.8       Bilirubin:   Recent Labs   Lab 12/19/18  0300  12/21/18  0412 12/22/18  0439 12/23/18  0429 12/24/18  0346 12/25/18  0433   BILIDIR 0.5*  --   --   --   --   --   --    BILITOT 1.5*   < > 0.9 1.2* 0.9 0.7 0.5    < > = values in this interval not displayed.     BMP:   Recent Labs   Lab 12/25/18  0433   *      K 3.7   *   CO2 23   BUN 49*   CREATININE 1.5*   CALCIUM 9.3   MG 2.3     CMP:   Recent Labs   Lab 12/23/18  2154 12/24/18  0346 12/25/18  0433    145 145   K 3.5 4.0 3.7    114* 113*   CO2 21* 20* 23   * 139* 142*   BUN 45* 49* 49*   CREATININE 1.6* 1.6* 1.5*   CALCIUM 9.6 9.5 9.3   PROT  --  6.4 6.1   ALBUMIN  --  3.1* 2.9*   BILITOT  --  0.7 0.5   ALKPHOS  --  45* 44*   AST  --  20 17   ALT  --  23 20   ANIONGAP 11 11 9   EGFRNONAA 30.0* 30.0* 32.4*       Significant Imaging:  I have reviewed all pertinent imaging results/findings within the past 24 hours.      ABG  Recent Labs   Lab 12/22/18  1554   PH 7.421   PO2 80   PCO2 31.8*   HCO3 20.7*   BE -4     Assessment/Plan:     Neuro   * Acute encephalopathy    82 y/o F with RA (chronically on steroids, plaquenil and sulfasalazine), history of multiple TIA's (on aspirin and plavix),  And vascular dementia presents to the ED on 12/17/18 with a change in her mentation status with blurry vision (baseline able to perform all ADLS exceptionally) as well as right sided leg pain. The patient denies any numbness or tingling in any of her extremities. Of note, the patient ran out of her prednisone 2 days prior to admission. She has been taking 25 mg daily. On arrival to the ED patient had SBP in the 220 (BL -130). She received 2 doses of Labetalol 20mg IV x 2 with improvement in her blood pressure to the 180s.  Head CT and MRI in the ED were negative for any acute process. EEG 12/19 showed toxic medical encephalopathy without epileptic waves. Repeat MRI does not  show any intracranial processes.    Plan  1) IR to obtain LP as we had three failed attempts on the floor. On 12/21 patient did not obtain LP on patient as she lost peripheral access when she went downstairs. Patient mental status is waxing and waning. S/p LP 12/24. CSF analysis unrevealing so far. HSV,VZV and VDRL in  CSF  2) Encephalopathy initially thought to be hypertensive as patient had blurry vision as well as hypertension. CT and MRI negative for stroke. BP control, pt unable to take orally. On IV hydralazine prn for  SBP>180. Reviewed images with radiology again and images do not indicate PRES or temporal lobe enhancement.   3)stopped vancomycin 12/25 (CSF unrevealing so far) continue cefepime, and acyclovir. Anticipation to stop Cefepime tomorrow 12/26   4) stopped trazodone 12/25   5) EEG twice were negative for seizure activity.      Altered mental status    -- see acute encephalopathy.     Hypertensive encephalopathy    -- see acute encephalopathy.     Dystonia    · Follows with Dr. Giang of Neurology  · Hold Gabapentin and Baclofen  for now given AMS     History of stroke    · Chronic and stable  · Hold Aspirin 81mg PO daily  · Hold Plavix 75mg PO daily >> will hold due to pt is not able to take PO and for possible LP.           Vascular dementia    · Chronic issue  · Very sharp and independent at baseline per family           Psychiatric   Delirium superimposed on dementia    -- see acute encephalopathy.     Cardiac/Vascular   Hypertension, essential    · Reports confusion and blurred vision x 2 days, with  on arrival - reports baseline BP to be 110-130s  · S/p Labetalol 20mg IV x 2 doses with improvement in BP to 180s  · Head CT and MRI negative for acute process. Repeat MRI negative which shows no acute intracranial abnormality.   · Takes Coreg 12.5mg PO BID at home.hold for now since BP is low        Renal/   Acute renal failure superimposed on stage 3 chronic kidney disease    --  baseline Creatine 1.1.  12/20 FC was inserted due to retention. There is an upward trend of creatinine  -- Urine lytes ordered- urine sodium <20, creatinine 174  -- strict in and out. Please  Match I/O  -- avoid nephrotoxic medication. Properly renally dosed antibiotics. If patient's creatinine keeps increasing, this could be due to acyclovir. Give IVF with caution?      Hematology   Thrombocytopenia    · Plt within normal range   · Monitor for bleeding     Endocrine   Poor nutrition    Although SLP has passed the patient to have regular diet with thin liquids, patient is too encephalopathic to swallow. NG tube placed.Recommend Isosource 1.5 @ 40 mL/hr to provide 1440 kcals, 65 g of protein, 733 mL fluid. Hold for residuals >500 mL; additional fluid per MD.         Orthopedic   Seropositive rheumatoid arthritis of multiple sites    Dx 2012 with Dr No, then Kiera  HCQ since 2012  Humira one dose April 2018 followed by ICU admission for pnemonia and resp failure     Other   Long-term use of immunosuppressant medication    Long Term Use of Systolic Steroids  · Follows with Dr. Rouse of Rheumatology  · On Prednisone 25mg PO daily >> switched to methylpred IV 20 mg daily.  · Hold Sulfasalazine 1g PO BID  · Hold Plaquenil 400mg PO daily  · Hold Bactrim for ppx                  Critical Care Daily Checklist:     A: Awake: RASS Goal/Actual Goal:    Actual: [place] Agitation Sedation Scale (RASS): (P) Restless   B: Spontaneous Breathing Trial Performed?  NA   C: SAT & SBT Coordinated?  NA                D: Delirium: CAM-ICU Overall CAM-ICU: (P) Positive   E: Early Mobility Performed? No   F: Feeding Goal: Goals: Meet % EEN, EPN  Status: Nutrition Goal Status: new   Current Diet Order  TF        AS: Analgesia/Sedation None   T: Thromboembolic Prophylaxis None   H: HOB > 300 Yes   U: Stress Ulcer Prophylaxis (if needed) None   G: Glucose Control None   B: Bowel Function     I: Indwelling Catheter (Lines & Hill)  Necessity Hill   D: De-escalation of Antimicrobials/Pharmacotherapies Stop vanc     Plan for the day/ETD Midline placement +/- fluro LP     Code Status:  Family/Goals of Care: Full Code            Critical secondary to Patient has a condition that poses threat to life and bodily function: acute encephalopathy      Critical care was time spent personally by me on the following activities: development of treatment plan with patient or surrogate and bedside caregivers, discussions with consultants, evaluation of patient's response to treatment, examination of patient, ordering and performing treatments and interventions, ordering and review of laboratory studies, ordering and review of radiographic studies, pulse oximetry, re-evaluation of patient's condition. This critical care time did not overlap with that of any other provider or involve time for any procedures.     Roasna Wright MD  Critical Care Medicine  [facility]

## 2018-12-25 NOTE — ASSESSMENT & PLAN NOTE
84 y/o F with RA (chronically on steroids, plaquenil and sulfasalazine), history of multiple TIA's (on aspirin and plavix),  And vascular dementia presents to the ED on 12/17/18 with a change in her mentation status with blurry vision (baseline able to perform all ADLS exceptionally) as well as right sided leg pain. The patient denies any numbness or tingling in any of her extremities. Of note, the patient ran out of her prednisone 2 days prior to admission. She has been taking 25 mg daily. On arrival to the ED patient had SBP in the 220 (BL -130). She received 2 doses of Labetalol 20mg IV x 2 with improvement in her blood pressure to the 180s.  Head CT and MRI in the ED were negative for any acute process. EEG 12/19 showed toxic medical encephalopathy without epileptic waves. Repeat MRI does not show any intracranial processes.    Plan  1) IR to obtain LP as we had three failed attempts on the floor. On 12/21 patient did not obtain LP on patient as she lost peripheral access when she went downstairs. Patient mental status is waxing and waning. S/p LP 12/24. CSF analysis unrevealing so far. HSV,VZV and VDRL in  CSF  2) Encephalopathy initially thought to be hypertensive as patient had blurry vision as well as hypertension. CT and MRI negative for stroke. BP control, pt unable to take orally. On IV hydralazine prn for  SBP>180. Reviewed images with radiology again and images do not indicate PRES or temporal lobe enhancement.   3)stopped vancomycin 12/25 (CSF unrevealing so far) continue cefepime, and acyclovir. Anticipation to stop Cefepime tomorrow 12/26   4) stopped trazodone 12/25   5) EEG twice were negative for seizure activity.

## 2018-12-25 NOTE — ASSESSMENT & PLAN NOTE
· Reports confusion and blurred vision x 2 days, with  on arrival - reports baseline BP to be 110-130s  · S/p Labetalol 20mg IV x 2 doses with improvement in BP to 180s  · Head CT and MRI negative for acute process. Repeat MRI negative which shows no acute intracranial abnormality.   · Takes Coreg 12.5mg PO BID at home.hold for now since BP is low

## 2018-12-25 NOTE — PROGRESS NOTES
[facility]  Neurology  Progress Note    Patient Name: Selma Rosado MD Jose J  MRN: [id number]  Admission Date: 12/16/2018  Hospital Length of Stay: 6 days  Code Status: Full Code   Attending Provider: Jeremiah Barajas MD  Primary Care Physician: Bhargav Hirsch MD   Principal Problem:Acute encephalopathy      Subjective:     Interval History: Patient somnolent and hard to awake. Received Trazodone at 11;00 PM the night prior. Per nursing staff, patient w/ normalizing sleep-wake cycle and is over all doing better.   Hypotensive overnight requiring IVF bolus.    Current Neurological Medications:     Current Facility-Administered Medications   Medication Dose Route Frequency Provider Last Rate Last Dose    acetaminophen suppository 650 mg  650 mg Rectal Q6H PRN Kristen Correa MD   650 mg at 12/20/18 0828    acyclovir (ZOVIRAX) 520 mg in dextrose 5 % 100 mL IVPB  10 mg/kg (Ideal) Intravenous Q12H Jermeiah Barajas  mL/hr at 12/25/18 0846 520 mg at 12/25/18 0846    aspirin chewable tablet 81 mg  81 mg Per NG tube Daily Karyn Balbuena MD   81 mg at 12/25/18 1201    ceFEPIme injection 2 g  2 [facility]   2 g at 12/25/18 1201    clopidogrel tablet 75 mg  75 mg Per NG tube Daily Karyn Balbuena MD   75 mg at 12/25/18 1201    dextrose 50 % in water (D50W) injection 25 g  25 g Intravenous PRN Karen Toledo MD        dextrose 50% injection 12.5 g  12.5 g Intravenous PRN Karen Toledo MD        dextrose 50% injection 12.5 g  12.5 g Intravenous PRN MEARRY Anguiano MD        glucagon (human recombinant) injection 1 mg  1 mg Intramuscular PRN Karen Toledo MD        heparin (porcine) injection 5,000 Units  5,000 Units Subcutaneous Q8H [facility] Terri Monte MD        hydrALAZINE injection 5 mg  5 mg Intravenous Q4H PRN Rogelio Mijares MD   5 mg at 12/22/18 1741    methylPREDNISolone sodium succinate injection 20 mg  20 mg Intravenous  Daily Rogelio Mijares MD   20 mg at 12/25/18 0845    ondansetron injection 4 mg  4 mg Intravenous Q6H PRN Shelli Bernstein NP   4 mg at 12/18/18 1845    senna-docusate 8.6-50 mg per tablet 1 tablet  1 tablet Oral Daily Rosana Wright MD   1 tablet at 12/25/18 0844    sodium chloride 0.9% flush 5 mL  5 mL Intravenous PRN Karen Toledo MD           Review of Systems   Constitutional: Positive for activity change, fatigue and fever.   HENT: Negative.    Gastrointestinal: Negative for nausea and vomiting.   Endocrine: Negative.    Genitourinary: Negative.    Skin: Negative.    Allergic/Immunologic: Negative.    Neurological: Positive for weakness.   Psychiatric/Behavioral: Positive for agitation and confusion.     Objective:     Vital Signs (Most Recent):  Temp: 98 °F (36.7 °C) (12/25/18 1130)  Pulse: 86 (12/25/18 1300)  Resp: (!) 22 (12/25/18 1300)  BP: (!) 86/50 (12/25/18 1300)  SpO2: 97 % (12/25/18 1300) Vital Signs (24h Range):  Temp:  [97.6 °F (36.4 °C)-98.8 °F (37.1 °C)] 98 °F (36.7 °C)  Pulse:  [] 86  Resp:  [16-40] 22  SpO2:  [95 %-100 %] 97 %  BP: ()/(43-79) 86/50     Weight: 72.4 kg (159 lb 9.8 oz)  Body mass index is 28.27 kg/m².    Physical Exam  General:  Well-developed, well-nourished, somnolent, hard to awake  HEENT:  NCAT, PERRLA, oropharyngeal membranes non-erythematous/without exudate, NG tube in place  Neck:  Supple, normal ROM without nuchal rigidity  Resp:  Symmetric expansion, no increased wob  CVS:  No LE edema, peripheral pulses 2+ (radial, dorsalis pedis)  GI:  Abd soft, non-distended, non-tender to palpation  Neurologic Exam:  Mental Status:  Somnolent, hard to awaken 2/2 Trazadone administration late last night  Cranial Nerves:  PERRLA, EOMI, no deficits noted.   Motor:  Normal bulk and tone. Moves extremities spontaneoulsy  Sensory: Noxious stimuli only, unable to assess light touch   Reflexes: R Biceps, brachioradialis, patellar, Achilles 1+.    L Biceps,  brachioradialis 3+, patellar, Achilles 2+  Coordination: Does not follow directions well.  Gait: Deferred       Significant Labs:   Hemoglobin A1c: No results for input(s): HGBA1C in the last 720 hours.  Blood Culture:   No results for input(s): LABBLOO in the last 48 hours.  BMP:   Recent Labs   Lab 12/23/18 2154 12/24/18 0346 12/25/18  0433   * 139* 142*    145 145   K 3.5 4.0 3.7    114* 113*   CO2 21* 20* 23   BUN 45* 49* 49*   CREATININE 1.6* 1.6* 1.5*   CALCIUM 9.6 9.5 9.3   MG 2.2 2.2 2.3     CBC:   Recent Labs   Lab 12/24/18 0346 12/25/18  0433   WBC 13.49* 13.14*   HGB 13.0 11.9*   HCT 40.5 40.0    176     CMP:   Recent Labs   Lab 12/23/18 2154 12/24/18 0346 12/25/18  0433   * 139* 142*    145 145   K 3.5 4.0 3.7    114* 113*   CO2 21* 20* 23   BUN 45* 49* 49*   CREATININE 1.6* 1.6* 1.5*   CALCIUM 9.6 9.5 9.3   MG 2.2 2.2 2.3   PROT  --  6.4 6.1   ALBUMIN  --  3.1* 2.9*   BILITOT  --  0.7 0.5   ALKPHOS  --  45* 44*   AST  --  20 17   ALT  --  23 20   ANIONGAP 11 11 9   EGFRNONAA 30.0* 30.0* 32.4*     CSF Culture:   Recent Labs   Lab 12/24/18  1640   CSFCULTURE No Growth to date     CSF Studies:   Recent Labs   Lab 12/24/18  1640   ALIQUT 3.0  3.0   APPEARCSF Clear  Clear   COLORCSF Colorless  Colorless   CSFWBC 2  0   CSFRBC 22*  2*   GLUCCSF 106*   PROTEINCSF 102*     Inflammatory Markers:   No results for input(s): SEDRATE, CRP, PROCAL in the last 48 hours.  POCT Glucose:   No results for input(s): POCTGLUCOSE in the last 24 hours.  Prealbumin: No results for input(s): PREALBUMIN in the last 48 hours.  Respiratory Culture: No results for input(s): GSRESP, RESPIRATORYC in the last 48 hours.  Urine Culture: No results for input(s): LABURIN in the last 48 hours.  Urine Studies:   No results for input(s): COLORU, APPEARANCEUA, PHUR, SPECGRAV, PROTEINUA, GLUCUA, KETONESU, BILIRUBINUA, OCCULTUA, NITRITE, UROBILINOGEN, LEUKOCYTESUR, RBCUA, WBCUA, BACTERIA,  SQUAMEPITHEL, HYALINECASTS in the last 48 hours.    Invalid input(s): LATESHA  All pertinent lab results from the past 24 hours have been reviewed.    Significant Imaging: I have reviewed all pertinent imaging results/findings within the past 24 hours.   EEG  There is evidence for R hemispheric cortical dysfunction consistent with a structural lesion and moderate generalized encephalopathy possibly due to toxic/metabolic etiology or medication effect. No seizures were recorded during this study.    Assessment and Plan:     * Acute encephalopathy    Initial presentation of blurry vision, consistent w/ HTN encephalopathy. Patient waxed and waned from baseline to more confusing during her stay in the observation unit. Was more somnolent on 12/18 and oral medication was stopped 2/2 aspiration risk and some difficulty w/ BP control w/ IV meds due to availability of medication as well as observation status. Had worsening mentation throughout the day and developed a fever ( Tmax 102.6), tachycardia, increased RR and persistently elevated BP.  She was transferred to MICU for increased level of care overnight on 12/19.    Somnolent this AM 2/2 Trazadone administration late last night. Overall improving, per nursing staff w/ normalizing sleep-wake cycles.     - MRI- no intracranial acute pathology   - LP obtained by IR   - Tube 4 - WBC 0, RBC 2, Prot 102 ( elevated) Glucose 106 ( serum at the time likely around 140s, no exact read to correlate w/ the LP, so about 2/3rds of serum glucose)   - No evidence of bacterial infection   - PCR studied pending   - Likely prot elevation 2/2 viral encephalitis in this immunocompromised patient; continue acyclovir for possible HSV and general supportive care   - Would recommend continuous EEG placement after the above studies completed for evaluation of episodic witnessed LUE/LLE rhythmic jerking to r/o seizure   - EEG w/ previous R hemispheric slowing    - Latest EEG w/ some L  hemispheric slowing, 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      History of stroke    - CTH w/o acute intracranial changes on repeat imaging on 12/18     Vascular dementia    - At baseline     Hypertension, essential    - Management per primary team          VTE Risk Mitigation (From admission, onward)        Ordered     heparin (porcine) injection 5,000 Units  Every 8 hours      12/25/18 1045     Place sequential compression device  Until discontinued      12/21/18 1024     IP VTE HIGH RISK PATIENT  Once      12/16/18 1933          Nereida English MD  Neurology  [facility] [address]

## 2018-12-25 NOTE — NURSING
Patient more alert this afternoon, speech more normal, AAO x3, still unaware of why she is in hospital, but now following commands appropriately. Vitals remain stable at this time, no c/o pain or sob. I assisted patient to sit edge of bed, patient was able to hold herself up sitting edge of bed with minimal assist for 5 minutes. VSS, myself and the patient's daughter assisted patient to stand. Patient tolerated standing, no orthostatic hypotension, no c/o pain/sob/dizziness at this time either. We then assisted her with moderate to max assistance to pivot and sit in bedside chair. Patient able to hold herself up in chair, moves all extremities equally, some generalized weakness noted.  Patient left in bedside chair with all lines intact, VSS, call button in reach, daughter to remain at bedside and instructed to call with any needs. Will continue to monitor patient.

## 2018-12-25 NOTE — PLAN OF CARE
Problem: Adult Inpatient Plan of Care  Goal: Plan of Care Review  Outcome: Ongoing (interventions implemented as appropriate)  Pt oriented to only self throughout most of shift and following simple commands. . However pt started to become more lethargic as night progressed. Pt arouses to stimulus and voice. 500cc bolus NS given for hypotension. Tube feedings continued. VS and assessment via flowsheet. Will continue to monitor.

## 2018-12-25 NOTE — SUBJECTIVE & OBJECTIVE
Interval History/Significant Events:  LP done (12/24) CSF negative for bacterial infection, couple viral CSF PCR still in process. Had one episode of hypotension to SBP 80s responded well to IVF     Review of Systems   Unable to perform ROS: Mental status change      Objective:     Vital Signs (Most Recent):  Temp: 98.8 °F (37.1 °C) (12/25/18 0301)  Pulse: 80 (12/25/18 0600)  Resp: 20 (12/25/18 0600)  BP: (!) 110/53 (12/25/18 0600)  SpO2: 98 % (12/25/18 0600) Vital Signs (24h Range):  Temp:  [97.8 °F (36.6 °C)-98.8 °F (37.1 °C)] 98.8 °F (37.1 °C)  Pulse:  [] 80  Resp:  [17-38] 20  SpO2:  [95 %-100 %] 98 %  BP: ()/(43-94) 110/53   Weight: 72.4 kg (159 lb 9.8 oz)  Body mass index is 28.27 kg/m².      Intake/Output Summary (Last 24 hours) at 12/25/2018 0845  Last data filed at 12/25/2018 0600  Gross per 24 hour   Intake 1345 ml   Output 734 ml   Net 611 ml       Physical Exam   Constitutional: No distress.   HENT:   Head: Normocephalic and atraumatic.   Eyes: Pupils are equal, round, and reactive to light. Right eye exhibits no discharge. Left eye exhibits no discharge.   Pulmonary/Chest: She has rales.   Abdominal: Soft. She exhibits distension. She exhibits no mass. There is no tenderness. There is no guarding.   Neurological: She displays normal reflexes. She exhibits abnormal muscle tone. Coordination normal.   Opens her eyes. Patient follows simple commands but does not track. PEERLA.      Skin: Skin is warm and dry. She is not diaphoretic.        Lines/Drains/Airways     Drain                 Urethral Catheter 12/19/18 1955 Straight-tip 5 days         NG/OG Tube 12/23/18 1430 [place] sump 14 Fr. Left nostril 1 day          Peripheral Intravenous Line                 Peripheral IV - Single Lumen 12/22/18 1040 Posterior;Right Hand 2 days         Midline Catheter Insertion/Assessment  - Single Lumen 12/24/18 1240 Left basilic vein (medial side of arm) 18g x 10cm less than 1 day              Significant  Labs:    CBC/Anemia Profile:  Recent Labs   Lab 12/24/18 0346 12/25/18  0433   WBC 13.49* 13.14*   HGB 13.0 11.9*   HCT 40.5 40.0    176   MCV 88 93   RDW 14.8* 14.9*        Chemistries:  Recent Labs   Lab 12/23/18 2154 12/24/18 0346 12/25/18  0433    145 145   K 3.5 4.0 3.7    114* 113*   CO2 21* 20* 23   BUN 45* 49* 49*   CREATININE 1.6* 1.6* 1.5*   CALCIUM 9.6 9.5 9.3   ALBUMIN  --  3.1* 2.9*   PROT  --  6.4 6.1   BILITOT  --  0.7 0.5   ALKPHOS  --  45* 44*   ALT  --  23 20   AST  --  20 17   MG 2.2 2.2 2.3   PHOS 3.1 2.6* 2.8       Bilirubin:   Recent Labs   Lab 12/19/18  0300 12/21/18  0412 12/22/18 0439 12/23/18  0429 12/24/18 0346 12/25/18  0433   BILIDIR 0.5*  --   --   --   --   --   --    BILITOT 1.5*   < > 0.9 1.2* 0.9 0.7 0.5    < > = values in this interval not displayed.     BMP:   Recent Labs   Lab 12/25/18  0433   *      K 3.7   *   CO2 23   BUN 49*   CREATININE 1.5*   CALCIUM 9.3   MG 2.3     CMP:   Recent Labs   Lab 12/23/18 2154 12/24/18 0346 12/25/18  0433    145 145   K 3.5 4.0 3.7    114* 113*   CO2 21* 20* 23   * 139* 142*   BUN 45* 49* 49*   CREATININE 1.6* 1.6* 1.5*   CALCIUM 9.6 9.5 9.3   PROT  --  6.4 6.1   ALBUMIN  --  3.1* 2.9*   BILITOT  --  0.7 0.5   ALKPHOS  --  45* 44*   AST  --  20 17   ALT  --  23 20   ANIONGAP 11 11 9   EGFRNONAA 30.0* 30.0* 32.4*       Significant Imaging:  I have reviewed all pertinent imaging results/findings within the past 24 hours.

## 2018-12-26 LAB
ALBUMIN SERPL BCP-MCNC: 2.8 G/DL
ALP SERPL-CCNC: 48 U/L
ALT SERPL W/O P-5'-P-CCNC: 19 U/L
ANION GAP SERPL CALC-SCNC: 8 MMOL/L
ANISOCYTOSIS BLD QL SMEAR: SLIGHT
AST SERPL-CCNC: 15 U/L
BASOPHILS # BLD AUTO: 0.01 K/UL
BASOPHILS NFR BLD: 0.1 %
BILIRUB SERPL-MCNC: 0.4 MG/DL
BUN SERPL-MCNC: 48 MG/DL
BURR CELLS BLD QL SMEAR: ABNORMAL
CALCIUM SERPL-MCNC: 8.9 MG/DL
CHLORIDE SERPL-SCNC: 116 MMOL/L
CO2 SERPL-SCNC: 20 MMOL/L
CREAT SERPL-MCNC: 1.3 MG/DL
CRYPTOC AG CSF QL LA: NEGATIVE
DIFFERENTIAL METHOD: ABNORMAL
EOSINOPHIL # BLD AUTO: 0.1 K/UL
EOSINOPHIL NFR BLD: 1 %
ERYTHROCYTE [DISTWIDTH] IN BLOOD BY AUTOMATED COUNT: 15.1 %
EST. GFR  (AFRICAN AMERICAN): 44.5 ML/MIN/1.73 M^2
EST. GFR  (NON AFRICAN AMERICAN): 38.6 ML/MIN/1.73 M^2
GLUCOSE SERPL-MCNC: 165 MG/DL
HCT VFR BLD AUTO: 38 %
HGB BLD-MCNC: 11.3 G/DL
HYPOCHROMIA BLD QL SMEAR: ABNORMAL
IMM GRANULOCYTES # BLD AUTO: 0.13 K/UL
IMM GRANULOCYTES NFR BLD AUTO: 1 %
LYMPHOCYTES # BLD AUTO: 2.2 K/UL
LYMPHOCYTES NFR BLD: 16.2 %
MAGNESIUM SERPL-MCNC: 2.2 MG/DL
MCH RBC QN AUTO: 27.2 PG
MCHC RBC AUTO-ENTMCNC: 29.7 G/DL
MCV RBC AUTO: 91 FL
MONOCYTES # BLD AUTO: 2.4 K/UL
MONOCYTES NFR BLD: 18.2 %
NEUTROPHILS # BLD AUTO: 8.5 K/UL
NEUTROPHILS NFR BLD: 63.5 %
NRBC BLD-RTO: 0 /100 WBC
OVALOCYTES BLD QL SMEAR: ABNORMAL
PHOSPHATE SERPL-MCNC: 2.4 MG/DL
PLATELET # BLD AUTO: 178 K/UL
PLATELET BLD QL SMEAR: ABNORMAL
PMV BLD AUTO: 10.3 FL
POIKILOCYTOSIS BLD QL SMEAR: SLIGHT
POTASSIUM SERPL-SCNC: 3.6 MMOL/L
PROT SERPL-MCNC: 5.8 G/DL
RBC # BLD AUTO: 4.16 M/UL
SODIUM SERPL-SCNC: 144 MMOL/L
WBC # BLD AUTO: 13.38 K/UL

## 2018-12-26 PROCEDURE — 63600175 PHARM REV CODE 636 W HCPCS: Performed by: INTERNAL MEDICINE

## 2018-12-26 PROCEDURE — 99233 SBSQ HOSP IP/OBS HIGH 50: CPT | Mod: GC,,, | Performed by: INTERNAL MEDICINE

## 2018-12-26 PROCEDURE — 84100 ASSAY OF PHOSPHORUS: CPT

## 2018-12-26 PROCEDURE — 83735 ASSAY OF MAGNESIUM: CPT

## 2018-12-26 PROCEDURE — 25000003 PHARM REV CODE 250: Performed by: INTERNAL MEDICINE

## 2018-12-26 PROCEDURE — 99232 SBSQ HOSP IP/OBS MODERATE 35: CPT | Mod: GC,,, | Performed by: PSYCHIATRY & NEUROLOGY

## 2018-12-26 PROCEDURE — 25000003 PHARM REV CODE 250: Performed by: STUDENT IN AN ORGANIZED HEALTH CARE EDUCATION/TRAINING PROGRAM

## 2018-12-26 PROCEDURE — 99232 PR SUBSEQUENT HOSPITAL CARE,LEVL II: ICD-10-PCS | Mod: GC,,, | Performed by: PSYCHIATRY & NEUROLOGY

## 2018-12-26 PROCEDURE — 97162 PT EVAL MOD COMPLEX 30 MIN: CPT

## 2018-12-26 PROCEDURE — 80053 COMPREHEN METABOLIC PANEL: CPT

## 2018-12-26 PROCEDURE — 11000001 HC ACUTE MED/SURG PRIVATE ROOM

## 2018-12-26 PROCEDURE — 85025 COMPLETE CBC W/AUTO DIFF WBC: CPT

## 2018-12-26 PROCEDURE — 92526 ORAL FUNCTION THERAPY: CPT

## 2018-12-26 PROCEDURE — 63600175 PHARM REV CODE 636 W HCPCS: Performed by: STUDENT IN AN ORGANIZED HEALTH CARE EDUCATION/TRAINING PROGRAM

## 2018-12-26 PROCEDURE — 99233 PR SUBSEQUENT HOSPITAL CARE,LEVL III: ICD-10-PCS | Mod: GC,,, | Performed by: INTERNAL MEDICINE

## 2018-12-26 PROCEDURE — 97530 THERAPEUTIC ACTIVITIES: CPT

## 2018-12-26 RX ORDER — AMOXICILLIN 250 MG
1 CAPSULE ORAL 2 TIMES DAILY
Status: DISCONTINUED | OUTPATIENT
Start: 2018-12-26 | End: 2018-12-28

## 2018-12-26 RX ORDER — POTASSIUM CHLORIDE 7.45 MG/ML
10 INJECTION INTRAVENOUS
Status: COMPLETED | OUTPATIENT
Start: 2018-12-26 | End: 2018-12-26

## 2018-12-26 RX ADMIN — HEPARIN SODIUM 5000 UNITS: 5000 INJECTION, SOLUTION INTRAVENOUS; SUBCUTANEOUS at 01:12

## 2018-12-26 RX ADMIN — CLOPIDOGREL 75 MG: 75 TABLET, FILM COATED ORAL at 08:12

## 2018-12-26 RX ADMIN — METHYLPREDNISOLONE SODIUM SUCCINATE 20 MG: 40 INJECTION, POWDER, FOR SOLUTION INTRAMUSCULAR; INTRAVENOUS at 08:12

## 2018-12-26 RX ADMIN — POTASSIUM CHLORIDE 10 MEQ: 7.46 INJECTION, SOLUTION INTRAVENOUS at 05:12

## 2018-12-26 RX ADMIN — HEPARIN SODIUM 5000 UNITS: 5000 INJECTION, SOLUTION INTRAVENOUS; SUBCUTANEOUS at 05:12

## 2018-12-26 RX ADMIN — SENNOSIDES AND DOCUSATE SODIUM 1 TABLET: 8.6; 5 TABLET ORAL at 09:12

## 2018-12-26 RX ADMIN — SENNOSIDES AND DOCUSATE SODIUM 1 TABLET: 8.6; 5 TABLET ORAL at 08:12

## 2018-12-26 RX ADMIN — ACYCLOVIR SODIUM 520 MG: 50 INJECTION, SOLUTION INTRAVENOUS at 09:12

## 2018-12-26 RX ADMIN — POTASSIUM CHLORIDE 10 MEQ: 7.46 INJECTION, SOLUTION INTRAVENOUS at 03:12

## 2018-12-26 RX ADMIN — ASPIRIN 81 MG CHEWABLE TABLET 81 MG: 81 TABLET CHEWABLE at 08:12

## 2018-12-26 RX ADMIN — ACYCLOVIR SODIUM 520 MG: 50 INJECTION, SOLUTION INTRAVENOUS at 11:12

## 2018-12-26 RX ADMIN — HEPARIN SODIUM 5000 UNITS: 5000 INJECTION, SOLUTION INTRAVENOUS; SUBCUTANEOUS at 09:12

## 2018-12-26 NOTE — SUBJECTIVE & OBJECTIVE
Subjective:     Interval History: Alert and oriented x 3 this AM, much more interactive, pleasant and conversant. Speech is tangential at times. Hallucinations overnight and poor sleep.   Daughter at bedside, although not back at her baseline, significantly improved.     Current Neurological Medications:   Acyclovir 520 mg BID   Prednisone 25 mg QD    Current Facility-Administered Medications   Medication Dose Route Frequency Provider Last Rate Last Dose    acetaminophen suppository 650 mg  650 mg Rectal Q6H PRN Kristen Correa MD   650 mg at 12/20/18 0828    acyclovir (ZOVIRAX) 520 mg in dextrose 5 % 100 mL IVPB  10 mg/kg (Ideal) Intravenous Q12H Jeremiah Barajas  mL/hr at 12/26/18 0930 520 mg at 12/26/18 0930    aspirin chewable tablet 81 mg  81 mg Per NG tube Daily Karyn Balbuena MD   81 mg at 12/26/18 0823    clopidogrel tablet 75 mg  75 mg Per NG tube Daily Karyn Balbuena MD   75 mg at 12/26/18 0823    dextrose 50 % in water (D50W) injection 25 g  25 g Intravenous PRN Karen Toledo MD        dextrose 50% injection 12.5 g  12.5 g Intravenous PRN MERARY Anguiano MD        glucagon (human recombinant) injection 1 mg  1 mg Intramuscular PRN Karen Toledo MD        heparin (porcine) injection 5,000 Units  5,000 Units Subcutaneous Q8H [facility] FIDELINA Monte MD   5,000 Units at 12/26/18 1323    hydrALAZINE injection 5 mg  5 mg Intravenous Q4H PRN Rogelio Mijares MD   5 mg at 12/22/18 1741    ondansetron injection 4 mg  4 mg Intravenous Q6H PRN Shelli Bernstein NP   4 mg at 12/18/18 1845    [START ON 12/27/2018] predniSONE tablet 25 mg  25 mg Oral Daily Rosana Wright MD        senna-docusate 8.6-50 mg per tablet 1 tablet  1 tablet Oral BID Rosana Wright MD   1 tablet at 12/26/18 0829    sodium chloride 0.9% flush 5 mL  5 mL Intravenous PRN Karen Toledo MD           Review of Systems   Constitutional: Positive for activity change, fatigue and fever.   HENT:  "Negative.    Gastrointestinal: Negative for nausea and vomiting.   Endocrine: Negative.    Genitourinary: Negative.    Skin: Negative.    Allergic/Immunologic: Negative.    Neurological: Positive for weakness.   Psychiatric/Behavioral: Positive for agitation and confusion.     Objective:     Vital Signs (Most Recent):  Temp: 97.7 °F (36.5 °C) (12/26/18 1513)  Pulse: 97 (12/26/18 1700)  Resp: (!) 25 (12/26/18 1700)  BP: (!) 118/59 (12/26/18 1700)  SpO2: 98 % (12/26/18 1700) Vital Signs (24h Range):  Temp:  [97.7 °F (36.5 °C)-98.2 °F (36.8 °C)] 97.7 °F (36.5 °C)  Pulse:  [82-97] 97  Resp:  [15-26] 25  SpO2:  [93 %-100 %] 98 %  BP: (104-167)/() 118/59     Weight: 72.4 kg (159 lb 9.8 oz)  Body mass index is 28.27 kg/m².    Physical Exam  General:  Well-developed, well-nourished, NAD  HEENT:  NCAT, PERRLA, oropharyngeal membranes non-erythematous/without exudate.  Neck:  Supple, normal ROM without nuchal rigidity  Resp:  Symmetric expansion, no increased wob  CVS:  No LE edema, peripheral pulses 2+ (radial, dorsalis pedis)  GI:  Abd soft, non-distended, non-tender to palpation  Neurologic Exam:  Mental Status:  A&Ox3, disoriented to year only. Follows commands appropriately, although becomes tangential at times.   Cranial Nerves:  PERRLA, EOMI, no deficits noted. Facial movements symmetric and present. Shoulder shrug 5/5   Motor:  Normal bulk and tone. Moves extremities spontaneously against gravity, but states she "doesn't want to do anything else".  Sensory:Intact throughout in face, UE and LE  Reflexes: R Biceps, brachioradialis, patellar, Achilles 1+.    L Biceps, brachioradialis 2+, patellar, Achilles 2+  Coordination:  Follows some directions, but refuses to follow others as she is "tired of this". No resting tremor.   Gait: Deferred       Significant Labs:   Hemoglobin A1c: No results for input(s): HGBA1C in the last 720 hours.  Blood Culture:   No results for input(s): LABBLOO in the last 48 hours.  BMP: "   Recent Labs   Lab 12/25/18 0433 12/26/18 0228   * 165*    144   K 3.7 3.6   * 116*   CO2 23 20*   BUN 49* 48*   CREATININE 1.5* 1.3   CALCIUM 9.3 8.9   MG 2.3 2.2     CBC:   Recent Labs   Lab 12/25/18 0433 12/26/18 0228   WBC 13.14* 13.38*   HGB 11.9* 11.3*   HCT 40.0 38.0    178     CMP:   Recent Labs   Lab 12/25/18 0433 12/26/18 0228   * 165*    144   K 3.7 3.6   * 116*   CO2 23 20*   BUN 49* 48*   CREATININE 1.5* 1.3   CALCIUM 9.3 8.9   MG 2.3 2.2   PROT 6.1 5.8*   ALBUMIN 2.9* 2.8*   BILITOT 0.5 0.4   ALKPHOS 44* 48*   AST 17 15   ALT 20 19   ANIONGAP 9 8   EGFRNONAA 32.4* 38.6*     CSF Culture:   No results for input(s): CSFCULTURE in the last 48 hours.  CSF Studies:   No results for input(s): ALIQUT, APPEARCSF, COLORCSF, CSFWBC, CSFRBC, GLUCCSF, LDHCSF, PROTEINCSF, VDRLCSF in the last 48 hours.  Inflammatory Markers:   No results for input(s): SEDRATE, CRP, PROCAL in the last 48 hours.  POCT Glucose:   No results for input(s): POCTGLUCOSE in the last 24 hours.  Prealbumin: No results for input(s): PREALBUMIN in the last 48 hours.  Respiratory Culture: No results for input(s): GSRESP, RESPIRATORYC in the last 48 hours.  Urine Culture: No results for input(s): LABURIN in the last 48 hours.  Urine Studies:   No results for input(s): COLORU, APPEARANCEUA, PHUR, SPECGRAV, PROTEINUA, GLUCUA, KETONESU, BILIRUBINUA, OCCULTUA, NITRITE, UROBILINOGEN, LEUKOCYTESUR, RBCUA, WBCUA, BACTERIA, SQUAMEPITHEL, HYALINECASTS in the last 48 hours.    Invalid input(s): WRIGHTSUR  All pertinent lab results from the past 24 hours have been reviewed.    Significant Imaging: I have reviewed all pertinent imaging results/findings within the past 24 hours.   EEG  There is evidence for R hemispheric cortical dysfunction consistent with a structural lesion and moderate generalized encephalopathy possibly due to toxic/metabolic etiology or medication effect. No seizures were recorded  during this study.

## 2018-12-26 NOTE — RESIDENT HANDOFF
Handoff     Primary Team: [facility] Room Number: 6074/6074 A     Patient Name: Selma Alonzo Lux MD MRN: [id number]     Date of Birth: [id number] Allergies: Patient has no known allergies.     Age: 83 y.o. Admit Date: 12/16/2018     Sex: female  BMI: Body mass index is 28.27 kg/m².     Code Status: Full Code        Illness Level (current clinical status): Watcher - No    Reason for Admission: Acute encephalopathy    Brief HPI   Ms. Selma Alonzo Lux MD is a 83 y.o. female with rheumatoid arthritis on steroids, Plaquenil and Sulfasalazine, history of stroke on Aspirin/Plavix, and vascular dementia who presents to the emergency department 12/16/18 with her daughter because of confusion.  History is obtained from the daughter, as the patient is not able to provide a good history.  The daughter mentions that at baseline, the patient is very independent and is very sharp.  However, the day prior to admission the patient was endorsing some blurred vision, having difficulty eating saying that she could not see her plate, and was having unusual behavior-like sitting on her bedroom floor, claiming that someone moved her bed causing her to fall.  She has been having tangential speech requiring frequent redirection.  Additionally, she was complaining of some right-sided leg pain, which is unusual, as she normally complaints of left-sided leg pain.  The patient denies any numbness or tingling in any of her extremities. Of note, the patient ran out of her prednisone 2 days prior to admission.  She has been taking 25 mg daily.     The in the emergency department, she was found to have a blood pressure of 220 on arrival.  Daughter reports that her baseline blood pressure runs low, 110-130. Reports that pt is complaint with her medications.   She received 2 doses of Labetalol 20mg IV x 2 with improvement in her blood pressure to the 180s.  Head CT and MRI in the ED were negative for any acute process.        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 Cell count in CSF not consistent with bacterial meningitis, continue on acyclovir but d/c vanc and cefipime, remains afebrile and WBC stable.  repeated EEG x2 with no signs of seizure activity , MRI not consistent with PRESS, or meningeal enhancement . Acute on chronic renal failure, cr improving. Immune compromised due to chronic Plaquenil (on hold) and  Steroid use for Rheumatoid arthritis     Tasks   - PT/OT  - f/u neurology recs  - continue acyclovir for likely viral encephalitis   - likely will need continuous EEG per neuro   - BP stable, consider restarting coreg if needed   - SLP. She passes bedside nurse swallowing test, she is on on soft mechanical at this time       Discharge Disposition: Skilled Nursing Facility per PT/OT recs     Mentored By: ICU team

## 2018-12-26 NOTE — PROGRESS NOTES
[facility]  Critical Care Medicine  Progress Note    Patient Name: Selma Alonzo Lux MD  MRN: [id number]  Admission Date: 12/16/2018  Hospital Length of Stay: 7 days  Code Status: Full Code  Attending Provider: Jeremiah Barajas MD  Primary Care Provider: Bhargav Hirsch MD   Principal Problem: Acute encephalopathy    Subjective:     HPI:  Ms. Hahn Alonzo Lux MD is a 83 y.o. female with rheumatoid arthritis on steroids, Plaquenil and Sulfasalazine, history of stroke on Aspirin/Plavix, and vascular dementia who presents to the emergency department 12/16/18 with her daughter because of confusion.  History is obtained from the daughter, as the patient is not able to provide a good history.  The daughter mentions that at baseline, the patient is very independent and is very sharp.  However, the day prior to admission the patient was endorsing some blurred vision, having difficulty eating saying that she could not see her plate, and was having unusual behavior-like sitting on her bedroom floor, claiming that someone moved her bed causing her to fall.  She has been having tangential speech requiring frequent redirection.  Additionally, she was complaining of some right-sided leg pain, which is unusual, as she normally complaints of left-sided leg pain.  The patient denies any numbness or tingling in any of her extremities. Of note, the patient ran out of her prednisone 2 days prior to admission.  She has been taking 25 mg daily.     The in the emergency department, she was found to have a blood pressure of 220 on arrival.  Daughter reports that her baseline blood pressure runs low, 110-130. Reports that pt is complaint with her medications.   She received 2 doses of Labetalol 20mg IV x 2 with improvement in her blood pressure to the 180s.  Head CT and MRI in the ED were negative for any acute process.             Hospital/ICU Course:  -12/19/2018 ICU was consulted for altered mental status and  obtundation. Pt's daughter said that since pt admitted to the hospital, she was confused but able to talk. Started in the last 24 hs, pt became more lethargic and obtunded. Pt was evaluated by neurology during this admission and they think her clinical picture is due to hypertensive emergency and usually clinical improvement lag after BP improvement. Also, pt's daughter that pt had some cough when she was drinking juice.  Pt was brought to the ICU for airway watch.  Started empirically with acyclovir, vancomycin and cefepime, pt mental status wax and wean and her blood pressure been labile. All septic work up been negative  -12/20 plan to do LP. cret worsened to 1.6, urine lytes sent.  -12/21 - Called IR to obtain LP.Anesthesia has already tried, and IR will now proceed with procedure. SLP/PT/OT ordered.   -12/22- IR could not get LP yesterday. Patient getting 24 EEG done today. MRI was reexamined with radiology which was not concerning for PRES or temporal enhancement.    -12/23: Spoke to EEG, although official read is not available patient is not in status epilepticus and there is no seizure like activity. Slowing due to metabolic issues.    - 12/24: no issues overnight. Was able to open her eye and speaks couple words but confused. Midline today +/- IR LP  - 12/25: LP done (12/24) CSF negative for bacterial infection, couple viral CSF PCR still in process. Had one episode of hypotension to SBP 80s responded well to IVF   12/26: No acute events overnight. NG tube inadvertently removed by patient. This morning, she is awake and talkative. She is AOx3.     Interval History/Significant Events:  No acute events overnight. NG tube inadvertently removed by patient. This morning, she is awake and talkative. She is AOx3. Per daughter, pt's mentation wax and wean and there are some confusions episodes     Review of Systems   Unable to perform ROS: Mental status change      Objective:     Vital Signs (Most Recent):  Temp:  98.2 °F (36.8 °C) (12/26/18 0800)  Pulse: 85 (12/26/18 0800)  Resp: (!) 21 (12/26/18 0800)  BP: (!) 118/56 (12/26/18 0800)  SpO2: 97 % (12/26/18 0800) Vital Signs (24h Range):  Temp:  [98 °F (36.7 °C)-98.6 °F (37 °C)] 98.2 °F (36.8 °C)  Pulse:  [82-96] 85  Resp:  [16-40] 21  SpO2:  [93 %-100 %] 97 %  BP: ()/(47-68) 118/56   Weight: 72.4 kg (159 lb 9.8 oz)  Body mass index is 28.27 kg/m².      Intake/Output Summary (Last 24 hours) at 12/26/2018 0829  Last data filed at 12/26/2018 0800  Gross per 24 hour   Intake 1657.5 ml   Output 897 ml   Net 760.5 ml       Physical Exam   Constitutional: She is oriented to person, place, and time. No distress.   HENT:   Head: Normocephalic and atraumatic.   Eyes: Pupils are equal, round, and reactive to light. Right eye exhibits no discharge. Left eye exhibits no discharge.   Pulmonary/Chest: She has rales.   Abdominal: Soft. She exhibits distension. She exhibits no mass. There is no tenderness. There is no guarding.   Neurological: She is alert and oriented to person, place, and time. She displays normal reflexes. She exhibits abnormal muscle tone. Coordination normal.   Opens her eyes. Patient follows simple commands   Skin: Skin is warm and dry. She is not diaphoretic.        Lines/Drains/Airways     Drain                 Urethral Catheter 12/19/18 1955 Straight-tip 6 days          Peripheral Intravenous Line                 Midline Catheter Insertion/Assessment  - Single Lumen 12/24/18 1240 Left basilic vein (medial side of arm) 18g x 10cm 1 day              Significant Labs:    CBC/Anemia Profile:  Recent Labs   Lab 12/25/18  0433 12/26/18  0228   WBC 13.14* 13.38*   HGB 11.9* 11.3*   HCT 40.0 38.0    178   MCV 93 91   RDW 14.9* 15.1*        Chemistries:  Recent Labs   Lab 12/25/18  0433 12/26/18  0228    144   K 3.7 3.6   * 116*   CO2 23 20*   BUN 49* 48*   CREATININE 1.5* 1.3   CALCIUM 9.3 8.9   ALBUMIN 2.9* 2.8*   PROT 6.1 5.8*   BILITOT 0.5 0.4    ALKPHOS 44* 48*   ALT 20 19   AST 17 15   MG 2.3 2.2   PHOS 2.8 2.4*       Bilirubin:   Recent Labs   Lab 12/19/18  0300  12/22/18  0439 12/23/18  0429 12/24/18  0346 12/25/18 0433 12/26/18 0228   BILIDIR 0.5*  --   --   --   --   --   --    BILITOT 1.5*   < > 1.2* 0.9 0.7 0.5 0.4    < > = values in this interval not displayed.     BMP:   Recent Labs   Lab 12/26/18 0228   *      K 3.6   *   CO2 20*   BUN 48*   CREATININE 1.3   CALCIUM 8.9   MG 2.2     CMP:   Recent Labs   Lab 12/25/18 0433 12/26/18 0228    144   K 3.7 3.6   * 116*   CO2 23 20*   * 165*   BUN 49* 48*   CREATININE 1.5* 1.3   CALCIUM 9.3 8.9   PROT 6.1 5.8*   ALBUMIN 2.9* 2.8*   BILITOT 0.5 0.4   ALKPHOS 44* 48*   AST 17 15   ALT 20 19   ANIONGAP 9 8   EGFRNONAA 32.4* 38.6*       Significant Imaging:  I have reviewed all pertinent imaging results/findings within the past 24 hours.      ABG  Recent Labs   Lab 12/22/18  1554   PH 7.421   PO2 80   PCO2 31.8*   HCO3 20.7*   BE -4     Assessment/Plan:     Neuro   * Acute encephalopathy    82 y/o F with RA (chronically on steroids, plaquenil and sulfasalazine), history of multiple TIA's (on aspirin and plavix),  And vascular dementia presents to the ED on 12/17/18 with a change in her mentation status with blurry vision (baseline able to perform all ADLS exceptionally) as well as right sided leg pain. The patient denies any numbness or tingling in any of her extremities. Of note, the patient ran out of her prednisone 2 days prior to admission. She has been taking 25 mg daily. On arrival to the ED patient had SBP in the 220 (BL -130). She received 2 doses of Labetalol 20mg IV x 2 with improvement in her blood pressure to the 180s.  Head CT and MRI in the ED were negative for any acute process. EEG 12/19 showed toxic medical encephalopathy without epileptic waves. Repeat MRI does not show any intracranial processes.    Plan  1) IR to obtain LP as we had three  failed attempts on the floor. On 12/21 patient did not obtain LP on patient as she lost peripheral access when she went downstairs. Patient mental status is waxing and waning. S/p LP 12/24. 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 HSV,VZV and VDRL pending. Will continue acyclovir and stop Cefepime 12/26/18  2) Encephalopathy initially thought to be hypertensive as patient had blurry vision as well as hypertension. CT and MRI negative for stroke. BP control, pt unable to take orally. On IV hydralazine prn for  SBP>180. Reviewed images with radiology again and images do not indicate PRES or temporal lobe enhancement.   3)stopped vancomycin 12/25 (CSF unrevealing so far) continue cefepime, and acyclovir. Anticipation to stop Cefepime tomorrow 12/26   4) stopped trazodone 12/25   5) EEG twice were negative for seizure activity.   6) Neurology following up and at this time, we are treating for viral encephalitis      Altered mental status    -- see acute encephalopathy.     Hypertensive encephalopathy    -- see acute encephalopathy.     Dystonia    · Follows with Dr. Giang of Neurology  · Hold Gabapentin and Baclofen  for now given AMS     History of stroke    · Chronic and stable  · Aspirin 81mg PO daily  · Plavix 75mg PO daily         Vascular dementia    · Chronic issue  · Very sharp and independent at baseline per family           Psychiatric   Delirium superimposed on dementia    -- see acute encephalopathy.     Cardiac/Vascular   Hypertension, essential    · Reports confusion and blurred vision x 2 days, with  on arrival - reports baseline BP to be 110-130s  · S/p Labetalol 20mg IV x 2 doses with improvement in BP to 180s  · Head CT and MRI negative for acute process. Repeat MRI negative which shows no acute intracranial abnormality.   · Takes Coreg 12.5mg PO BID at home.hold for now since BP is low        Renal/   Acute renal failure superimposed on stage 3 chronic kidney disease    -- baseline Creatine  1.1.12/20 FC was inserted due to retention.   -- Cr is improving today   -- Urine lytes ordered- urine sodium <20, creatinine 174  -- strict in and out. Please  Match I/O  -- avoid nephrotoxic medication. Properly renally dosed antibiotics.     Hematology   Thrombocytopenia    · Plt within normal range   · Monitor for bleeding     Endocrine   Poor nutrition    Although SLP has passed the patient to have regular diet with thin liquids, patient is too encephalopathic to swallow. NG tube placed.Recommend Isosource 1.5 @ 40 mL/hr to provide 1440 kcals, 65 g of protein, 733 mL fluid. Hold for residuals >500 mL; additional fluid per MD.         Orthopedic   Seropositive rheumatoid arthritis of multiple sites    Dx 2012 with Dr No, then Kiera  HCQ since 2012  Humira one dose April 2018 followed by ICU admission for pnemonia and resp failure     Other   Long-term use of immunosuppressant medication    Long Term Use of Systolic Steroids  · Follows with Dr. Rouse of Rheumatology  · On Prednisone 25mg PO daily >> switched to methylpred IV 20 mg daily.  · Hold Sulfasalazine 1g PO BID  · Hold Plaquenil 400mg PO daily  · Hold Bactrim for ppx                     Critical Care Daily Checklist:     A: Awake: RASS Goal/Actual Goal:    Actual: Monzon Agitation Sedation Scale (RASS): (P) Restless   B: Spontaneous Breathing Trial Performed?  NA   C: SAT & SBT Coordinated?  NA                D: Delirium: CAM-ICU Overall CAM-ICU: (P) Positive   E: Early Mobility Performed? No   F: Feeding Goal: Goals: Meet % EEN, EPN  Status: Nutrition Goal Status: new   Current Diet Order  Soft mechanical       AS: Analgesia/Sedation None   T: Thromboembolic Prophylaxis heparie   H: HOB > 300 Yes   U: Stress Ulcer Prophylaxis (if needed) None   G: Glucose Control None   B: Bowel Function     I: Indwelling Catheter (Lines & Hill) Necessity Hill   D: De-escalation of Antimicrobials/Pharmacotherapies Stop cefepime      Plan for the day/ETD  Continue acyclovir, stop cefepime, SLP, +/- stepdown      Code Status:  Family/Goals of Care: Full Code            Critical secondary to Patient has a condition that poses threat to life and bodily function: acute encephalopathy        Critical care was time spent personally by me on the following activities: development of treatment plan with patient or surrogate and bedside caregivers, discussions with consultants, evaluation of patient's response to treatment, examination of patient, ordering and performing treatments and interventions, ordering and review of laboratory studies, ordering and review of radiographic studies, pulse oximetry, re-evaluation of patient's condition. This critical care time did not overlap with that of any other provider or involve time for any procedures.     Rosana Wright MD  Critical Care Medicine  [facility]

## 2018-12-26 NOTE — PLAN OF CARE
Problem: SLP Goal  Goal: SLP Goal  Speech Language Pathology Goals  Goals expected to be met by 12/28  1. Pt will tolerate a dental soft diet and thin liquids with no overt signs of airway compromise.         Outcome: Ongoing (interventions implemented as appropriate)  Pt was seen for ST session

## 2018-12-26 NOTE — PT/OT/SLP PROGRESS
Speech Language Pathology Treatment    Patient Name:  Selma Lux MD   MRN:  [id number]  Admitting Diagnosis: Acute encephalopathy    Recommendations:                 General Recommendations:  follow up to ensure diet tolerance  Diet recommendations:  Dental Soft, Liquid Diet Level: Thin   Aspiration Precautions: 1 bite/sip at a time, Assistance with meals, Eliminate distractions, Feed only when awake/alert, HOB to 90 degrees, Monitor for s/s of aspiration, Small bites/sips and Strict aspiration precautions   General Precautions: Standard, fall, dental soft    Subjective   Awake yet verbal cueing required to keep eyes open for session. Sister at bedside. NSG reports team upgraded diet to dental soft & thin & pt tolerated entire lunch meal without difficulty.     Pain/Comfort:  · Pain Rating 1: 0/10  · Pain Rating Post-Intervention 2: 0/10    Objective:     Has the patient been evaluated by SLP for swallowing?   Yes  Keep patient NPO? No   Current Respiratory Status: room air      Pt repositioned to fully upright in chair. Sister reports pt usually eats with dentures but they are not bringing to hospital because a pair was lost during last hospitalization. Pt tolerated solid trial (1/2 eryn cracker) with prolonged mastication yet clears IND'ly & no s/s aspiration. Pt consumed 5 oz thin via consecutive straw sips with no s/s aspiration. SLP reviewed recs & swallow precautions with sister who verbalized understanding.     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 female with oropharyngeal swallow appears WFL. ST service will continue to follow to ensure tolerance of diet.     Goals:   Multidisciplinary Problems     SLP Goals        Problem: SLP Goal    Goal Priority Disciplines Outcome   SLP Goal     SLP Ongoing (interventions implemented as appropriate)   Description:  Speech Language Pathology Goals  Goals expected to be met by 12/28  1. Pt will tolerate a dental soft diet and thin liquids with  no overt signs of airway compromise.                          Plan:     · Patient to be seen:  4 x/week   · Plan of Care expires:  01/21/19  · Plan of Care reviewed with:  patient, sibling   · SLP Follow-Up:  Yes       Discharge recommendations:  nursing facility, skilled     Time Tracking:     SLP Treatment Date:   12/26/18  Speech Start Time:  1611  Speech Stop Time:  1621     Speech Total Time (min):  10 min    Billable Minutes: Treatment Swallowing Dysfunction 10    Teri Michael CCC-SLP  12/26/2018

## 2018-12-26 NOTE — ASSESSMENT & PLAN NOTE
Initial presentation of blurry vision, consistent w/ HTN encephalopathy. Patient waxed and waned from baseline to more confusing during her stay in the observation unit. Was more somnolent on 12/18 and oral medication was stopped 2/2 aspiration risk and some difficulty w/ BP control w/ IV meds due to availability of medication as well as observation status. Had worsening mentation throughout the day and developed a fever ( Tmax 102.6), tachycardia, increased RR and persistently elevated BP.  She was transferred to MICU for increased level of care overnight on 12/19.    Continues to improve, especially today; family at bedside agrees that she is better, although not back to her baseline. Consider Ramelteon as a sleep air or a smaller dose of trazodone as patient agitated and with hallucinations overnight.     - MRI- no intracranial acute pathology   - LP obtained by IR   - Tube 4 - WBC 0, RBC 2, Prot 102 ( elevated) Glucose 106 ( serum at the time likely around 140s, no exact read to correlate w/ the LP, so about 2/3rds of serum glucose)   - No evidence of bacterial infection   - PCR studied pending   - Likely prot elevation 2/2 viral encephalitis in this immunocompromised patient; continue acyclovir for possible HSV and general supportive care   - Would recommend continuous EEG placement after the above studies completed for evaluation of episodic witnessed LUE/LLE rhythmic jerking to r/o seizure   - EEG w/ previous R hemispheric slowing    - Latest EEG w/ some L hemispheric slowing, consistent w/ patient's old L sided infarct, but overall not specific   - Continue Acyclovir for viral coverage, until PCR is back for HSV. Patient is immunocompromised 2/2 RA medication (steroids, DMARD)   - Will continue to follow patient.

## 2018-12-26 NOTE — ASSESSMENT & PLAN NOTE
- CTH w/o acute intracranial changes on repeat imaging on 12/18  - Small remote lacunar type infarcts noted within the bilateral cerebellar hemispheres

## 2018-12-26 NOTE — ASSESSMENT & PLAN NOTE
82 y/o F with RA (chronically on steroids, plaquenil and sulfasalazine), history of multiple TIA's (on aspirin and plavix),  And vascular dementia presents to the ED on 12/17/18 with a change in her mentation status with blurry vision (baseline able to perform all ADLS exceptionally) as well as right sided leg pain. The patient denies any numbness or tingling in any of her extremities. Of note, the patient ran out of her prednisone 2 days prior to admission. She has been taking 25 mg daily. On arrival to the ED patient had SBP in the 220 (BL -130). She received 2 doses of Labetalol 20mg IV x 2 with improvement in her blood pressure to the 180s.  Head CT and MRI in the ED were negative for any acute process. EEG 12/19 showed toxic medical encephalopathy without epileptic waves. Repeat MRI does not show any intracranial processes.    Plan  1) IR to obtain LP as we had three failed attempts on the floor. On 12/21 patient did not obtain LP on patient as she lost peripheral access when she went downstairs. Patient mental status is waxing and waning. S/p LP 12/24. 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 HSV,VZV and VDRL pending. Will continue acyclovir and stop Cefepime 12/26/18  2) Encephalopathy initially thought to be hypertensive as patient had blurry vision as well as hypertension. CT and MRI negative for stroke. BP control, pt unable to take orally. On IV hydralazine prn for  SBP>180. Reviewed images with radiology again and images do not indicate PRES or temporal lobe enhancement.   3)stopped vancomycin 12/25 (CSF unrevealing so far) continue cefepime, and acyclovir. Anticipation to stop Cefepime tomorrow 12/26   4) stopped trazodone 12/25   5) EEG twice were negative for seizure activity.   6) Neurology following up and at this time, we are treating for viral encephalitis

## 2018-12-26 NOTE — ASSESSMENT & PLAN NOTE
- Management per primary team   - Control of elevated BP significantly improved; SBP in 140's on interview and exam today

## 2018-12-26 NOTE — PLAN OF CARE
Problem: Adult Inpatient Plan of Care  Goal: Plan of Care Review  Outcome: Ongoing (interventions implemented as appropriate)    No acute events throughout day. See vital signs and assessments in flowsheets. See below for updates on today's progress.     Pulmonary: RA sats %    Cardiovascular: NSR, 80-90, -130s, MAP 80-90    Neurological: Disoriented to time, place, and situation. Waxes and wanes in state of confusion. Speech clear, follows commands, moves all extremities spontaneously. Afebrile     Gastrointestinal: NG tube inadvertently removed by patient; NPO pending swallow study     Genitourinary: Hill remains, hourly UO ~ 30-50cc    Integumentary/Other: CDI     Infusions: KVO     Patient progressing towards goals as tolerated, plan of care communicated and reviewed with Selma Lux MD and family. All concerns addressed. Will continue to monitor.

## 2018-12-26 NOTE — SUBJECTIVE & OBJECTIVE
Interval History/Significant Events:  No acute events overnight. NG tube inadvertently removed by patient. This morning, she is awake and talkative. She is AOx3. Per daughter, pt's mentation wax and wean and there are some confusions episodes     Review of Systems   Unable to perform ROS: Mental status change      Objective:     Vital Signs (Most Recent):  Temp: 98.2 °F (36.8 °C) (12/26/18 0800)  Pulse: 85 (12/26/18 0800)  Resp: (!) 21 (12/26/18 0800)  BP: (!) 118/56 (12/26/18 0800)  SpO2: 97 % (12/26/18 0800) Vital Signs (24h Range):  Temp:  [98 °F (36.7 °C)-98.6 °F (37 °C)] 98.2 °F (36.8 °C)  Pulse:  [82-96] 85  Resp:  [16-40] 21  SpO2:  [93 %-100 %] 97 %  BP: ()/(47-68) 118/56   Weight: 72.4 kg (159 lb 9.8 oz)  Body mass index is 28.27 kg/m².      Intake/Output Summary (Last 24 hours) at 12/26/2018 0829  Last data filed at 12/26/2018 0800  Gross per 24 hour   Intake 1657.5 ml   Output 897 ml   Net 760.5 ml       Physical Exam   Constitutional: She is oriented to person, place, and time. No distress.   HENT:   Head: Normocephalic and atraumatic.   Eyes: Pupils are equal, round, and reactive to light. Right eye exhibits no discharge. Left eye exhibits no discharge.   Pulmonary/Chest: She has rales.   Abdominal: Soft. She exhibits distension. She exhibits no mass. There is no tenderness. There is no guarding.   Neurological: She is alert and oriented to person, place, and time. She displays normal reflexes. She exhibits abnormal muscle tone. Coordination normal.   Opens her eyes. Patient follows simple commands   Skin: Skin is warm and dry. She is not diaphoretic.        Lines/Drains/Airways     Drain                 Urethral Catheter 12/19/18 1955 Straight-tip 6 days          Peripheral Intravenous Line                 Midline Catheter Insertion/Assessment  - Single Lumen 12/24/18 1240 Left basilic vein (medial side of arm) 18g x 10cm 1 day              Significant Labs:    CBC/Anemia Profile:  Recent Labs    Lab 12/25/18 0433 12/26/18 0228   WBC 13.14* 13.38*   HGB 11.9* 11.3*   HCT 40.0 38.0    178   MCV 93 91   RDW 14.9* 15.1*        Chemistries:  Recent Labs   Lab 12/25/18 0433 12/26/18 0228    144   K 3.7 3.6   * 116*   CO2 23 20*   BUN 49* 48*   CREATININE 1.5* 1.3   CALCIUM 9.3 8.9   ALBUMIN 2.9* 2.8*   PROT 6.1 5.8*   BILITOT 0.5 0.4   ALKPHOS 44* 48*   ALT 20 19   AST 17 15   MG 2.3 2.2   PHOS 2.8 2.4*       Bilirubin:   Recent Labs   Lab 12/19/18  0300  12/22/18 0439 12/23/18 0429 12/24/18 0346 12/25/18 0433 12/26/18 0228   BILIDIR 0.5*  --   --   --   --   --   --    BILITOT 1.5*   < > 1.2* 0.9 0.7 0.5 0.4    < > = values in this interval not displayed.     BMP:   Recent Labs   Lab 12/26/18 0228   *      K 3.6   *   CO2 20*   BUN 48*   CREATININE 1.3   CALCIUM 8.9   MG 2.2     CMP:   Recent Labs   Lab 12/25/18 0433 12/26/18 0228    144   K 3.7 3.6   * 116*   CO2 23 20*   * 165*   BUN 49* 48*   CREATININE 1.5* 1.3   CALCIUM 9.3 8.9   PROT 6.1 5.8*   ALBUMIN 2.9* 2.8*   BILITOT 0.5 0.4   ALKPHOS 44* 48*   AST 17 15   ALT 20 19   ANIONGAP 9 8   EGFRNONAA 32.4* 38.6*       Significant Imaging:  I have reviewed all pertinent imaging results/findings within the past 24 hours.

## 2018-12-26 NOTE — PROGRESS NOTES
[facility]  Neurology  Progress Note    Patient Name: Selma Rosado MD Jose J  MRN: [id number]  Admission Date: 12/16/2018  Hospital Length of Stay: 7 days  Code Status: Full Code   Attending Provider: Jeremiah Barajas MD  Primary Care Physician: Bhargav Hirsch MD   Principal Problem:Acute encephalopathy      Subjective:     Interval History: Alert and oriented x 3 this AM, much more interactive, pleasant and conversant. Speech is tangential at times. Hallucinations overnight and poor sleep.   Daughter at bedside, although not back at her baseline, significantly improved.     Current Neurological Medications:   Acyclovir 520 mg BID   Prednisone 25 mg QD    Current Facility-Administered Medications   Medication Dose Route Frequency Provider Last Rate Last Dose    acetaminophen suppository 650 mg  650 mg Rectal Q6H PRN Kristen Correa MD   650 mg at 12/20/18 0828    acyclovir (ZOVIRAX) 520 mg in dextrose 5 % 100 mL IVPB  10 mg/kg (Ideal) Intravenous Q12H Jeremiah Barajas  mL/hr at 12/26/18 0930 520 mg at 12/26/18 0930    aspirin chewable tablet 81 mg  81 mg Per NG tube Daily Karyn Balbuena MD   81 mg at 12/26/18 0823    clopidogrel tablet 75 mg  75 mg Per NG tube Daily Karyn Balbuena MD   75 mg at 12/26/18 0823    dextrose 50 % in water (D50W) injection 25 g  25 g Intravenous PRN Karen Toledo MD        dextrose 50% injection 12.5 g  12.5 g Intravenous PRN MERARY Anguiano MD        glucagon (human recombinant) injection 1 mg  1 mg Intramuscular PRN Karen Toledo MD        heparin (porcine) injection 5,000 Units  5,000 Units Subcutaneous Q8H Hind Terri Monte MD   5,000 Units at 12/26/18 1323    hydrALAZINE injection 5 mg  5 mg Intravenous Q4H PRN Rogelio Mijares MD   5 mg at 12/22/18 1741    ondansetron injection 4 mg  4 mg Intravenous Q6H PRN Shelli Bernstein NP   4 mg at 12/18/18 1845    [START ON 12/27/2018] predniSONE tablet 25 mg  25 mg Oral Daily  "Rosana Wright MD        senna-docusate 8.6-50 mg per tablet 1 tablet  1 tablet Oral BID Rosana Wright MD   1 tablet at 12/26/18 0829    sodium chloride 0.9% flush 5 mL  5 mL Intravenous PRN Karen Toledo MD           Review of Systems   Constitutional: Positive for activity change, fatigue and fever.   HENT: Negative.    Gastrointestinal: Negative for nausea and vomiting.   Endocrine: Negative.    Genitourinary: Negative.    Skin: Negative.    Allergic/Immunologic: Negative.    Neurological: Positive for weakness.   Psychiatric/Behavioral: Positive for agitation and confusion.     Objective:     Vital Signs (Most Recent):  Temp: 97.7 °F (36.5 °C) (12/26/18 1513)  Pulse: 97 (12/26/18 1700)  Resp: (!) 25 (12/26/18 1700)  BP: (!) 118/59 (12/26/18 1700)  SpO2: 98 % (12/26/18 1700) Vital Signs (24h Range):  Temp:  [97.7 °F (36.5 °C)-98.2 °F (36.8 °C)] 97.7 °F (36.5 °C)  Pulse:  [82-97] 97  Resp:  [15-26] 25  SpO2:  [93 %-100 %] 98 %  BP: (104-167)/() 118/59     Weight: 72.4 kg (159 lb 9.8 oz)  Body mass index is 28.27 kg/m².    Physical Exam  General:  Well-developed, well-nourished, NAD  HEENT:  NCAT, PERRLA, oropharyngeal membranes non-erythematous/without exudate.  Neck:  Supple, normal ROM without nuchal rigidity  Resp:  Symmetric expansion, no increased wob  CVS:  No LE edema, peripheral pulses 2+ (radial, dorsalis pedis)  GI:  Abd soft, non-distended, non-tender to palpation  Neurologic Exam:  Mental Status:  A&Ox3, disoriented to year only. Follows commands appropriately, although becomes tangential at times.   Cranial Nerves:  PERRLA, EOMI, no deficits noted. Facial movements symmetric and present. Shoulder shrug 5/5   Motor:  Normal bulk and tone. Moves extremities spontaneously against gravity, but states she "doesn't want to do anything else".  Sensory:Intact throughout in face, UE and LE  Reflexes: R Biceps, brachioradialis, patellar, Achilles 1+.    L Biceps, brachioradialis 2+, " "patellar, Achilles 2+  Coordination:  Follows some directions, but refuses to follow others as she is "tired of this". No resting tremor.   Gait: Deferred       Significant Labs:   Hemoglobin A1c: No results for input(s): HGBA1C in the last 720 hours.  Blood Culture:   No results for input(s): LABBLOO in the last 48 hours.  BMP:   Recent Labs   Lab 12/25/18 0433 12/26/18 0228   * 165*    144   K 3.7 3.6   * 116*   CO2 23 20*   BUN 49* 48*   CREATININE 1.5* 1.3   CALCIUM 9.3 8.9   MG 2.3 2.2     CBC:   Recent Labs   Lab 12/25/18 0433 12/26/18 0228   WBC 13.14* 13.38*   HGB 11.9* 11.3*   HCT 40.0 38.0    178     CMP:   Recent Labs   Lab 12/25/18 0433 12/26/18 0228   * 165*    144   K 3.7 3.6   * 116*   CO2 23 20*   BUN 49* 48*   CREATININE 1.5* 1.3   CALCIUM 9.3 8.9   MG 2.3 2.2   PROT 6.1 5.8*   ALBUMIN 2.9* 2.8*   BILITOT 0.5 0.4   ALKPHOS 44* 48*   AST 17 15   ALT 20 19   ANIONGAP 9 8   EGFRNONAA 32.4* 38.6*     CSF Culture:   No results for input(s): CSFCULTURE in the last 48 hours.  CSF Studies:   No results for input(s): ALIQUT, APPEARCSF, COLORCSF, CSFWBC, CSFRBC, GLUCCSF, LDHCSF, PROTEINCSF, VDRLCSF in the last 48 hours.  Inflammatory Markers:   No results for input(s): SEDRATE, CRP, PROCAL in the last 48 hours.  POCT Glucose:   No results for input(s): POCTGLUCOSE in the last 24 hours.  Prealbumin: No results for input(s): PREALBUMIN in the last 48 hours.  Respiratory Culture: No results for input(s): GSRESP, RESPIRATORYC in the last 48 hours.  Urine Culture: No results for input(s): LABURIN in the last 48 hours.  Urine Studies:   No results for input(s): COLORU, APPEARANCEUA, PHUR, SPECGRAV, PROTEINUA, GLUCUA, KETONESU, BILIRUBINUA, OCCULTUA, NITRITE, UROBILINOGEN, LEUKOCYTESUR, RBCUA, WBCUA, BACTERIA, SQUAMEPITHEL, HYALINECASTS in the last 48 hours.    Invalid input(s): LATESHA  All pertinent lab results from the past 24 hours have been " reviewed.    Significant Imaging: I have reviewed all pertinent imaging results/findings within the past 24 hours.   EEG  There is evidence for R hemispheric cortical dysfunction consistent with a structural lesion and moderate generalized encephalopathy possibly due to toxic/metabolic etiology or medication effect. No seizures were recorded during this study.    Assessment and Plan:     * Acute encephalopathy    Initial presentation of blurry vision, consistent w/ HTN encephalopathy. Patient waxed and waned from baseline to more confusing during her stay in the observation unit. Was more somnolent on 12/18 and oral medication was stopped 2/2 aspiration risk and some difficulty w/ BP control w/ IV meds due to availability of medication as well as observation status. Had worsening mentation throughout the day and developed a fever ( Tmax 102.6), tachycardia, increased RR and persistently elevated BP.  She was transferred to MICU for increased level of care overnight on 12/19.    Continues to improve, especially today; family at bedside agrees that she is better, although not back to her baseline. Consider Ramelteon as a sleep air or a smaller dose of trazodone as patient agitated and with hallucinations overnight.     - MRI- no intracranial acute pathology   - LP obtained by IR   - Tube 4 - WBC 0, RBC 2, Prot 102 ( elevated) Glucose 106 ( serum at the time likely around 140s, no exact read to correlate w/ the LP, so about 2/3rds of serum glucose)   - No evidence of bacterial infection   - PCR studied pending   - Likely prot elevation 2/2 viral encephalitis in this immunocompromised patient; continue acyclovir for possible HSV and general supportive care   - Would recommend continuous EEG placement after the above studies completed for evaluation of episodic witnessed LUE/LLE rhythmic jerking to r/o seizure   - EEG w/ previous R hemispheric slowing    - Latest EEG w/ some L hemispheric slowing, consistent w/ patient's  old L sided infarct, but overall not specific   - Continue Acyclovir for viral coverage, until PCR is back for HSV. Patient is immunocompromised 2/2 RA medication (steroids, DMARD)   - Will continue to follow patient.      History of stroke    - CTH w/o acute intracranial changes on repeat imaging on 12/18  - Small remote lacunar type infarcts noted within the bilateral cerebellar hemispheres     Vascular dementia    - At baseline     Hypertension, essential    - Management per primary team   - Control of elevated BP significantly improved; SBP in 140's on interview and exam today          VTE Risk Mitigation (From admission, onward)        Ordered     heparin (porcine) injection 5,000 Units  Every 8 hours      12/25/18 1045     Place sequential compression device  Until discontinued      12/21/18 1024     IP VTE HIGH RISK PATIENT  Once      12/16/18 1933          Nereida English MD  Neurology  [facility]    I have personally taken the history and examined the patient and agree with the resident's note as stated above.    Gael Arriaza MD, MARVEL, FAAN  Department of Neurology   [facility] [place], [place]

## 2018-12-26 NOTE — PT/OT/SLP EVAL
Physical Therapy Evaluation    Patient Name:  Selma Alonzo Lux MD   MRN:  [id number]  Admitting Diagnosis:  Acute encephalopathy   Recent Surgery: * No surgery found *      Recommendations:     Discharge Recommendations:  nursing facility, skilled(vs. HH with 24hr supervision & support)   Discharge Equipment Recommendations: none   Barriers to discharge: Decreased caregiver support    Plan:     During this hospitalization, patient to be seen 4 x/week to address the above listed problems via gait training, therapeutic activities, therapeutic exercises, neuromuscular re-education  · Plan of Care Expires:  01/25/19   Plan of Care Reviewed with: patient, family    This Plan of care has been discussed with the patient who was involved in its development and understands and is in agreement with the identified goals and treatment plan    History:     Patient lives with their family in [place], [place] in a single family home. Pt states her daughter has been living with her since April 2018  Patient reports being independent with mobility with use of SPC & needed assist with ADLs.  Patient uses DME as follows: bedside commode, walker, rolling, wheelchair, cane, straight.    DME owned (not currently used): none.  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    Drive: no.   Upon discharge, patient will have assistance from daughter.    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    Hypertension     IBS (irritable  "bowel syndrome) 6/21/2017    IGT (impaired glucose tolerance) 1/12/2016    Iron deficiency anemia secondary to inadequate dietary iron intake 6/24/2013    Joint pain     Keratoconjunctivitis sicca of both eyes not specified as Sjogren's 7/29/2016    Leiomyoma of uterus, unspecified 9/16/2014    Long QT interval 6/29/2016    Due to medication (plaquenil)     Macular edema 1/12/2015    Multinodular goiter 1/12/2016    Neuropathy 8/23/2017    Plaquenil causing adverse effect in therapeutic use 10/7/2016    Pneumococcal vaccine refused 8/17/2016    Pneumonia due to Streptococcus pneumoniae 4/5/2018    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/2015    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    s/p Right Total knee replacement 5/15/2013    Scoliosis of thoracic spine 5/15/2013    Small vessel disease, cerebrovascular 12/28/2017    Stroke     Vascular dementia 12/6/2017       Past Surgical History:   Procedure Laterality Date    CATARACT EXTRACTION      COLONOSCOPY N/A 9/29/2015    Performed by FIDELINA Sanchez MD at [facility]O (4TH FLR)    EGD (ESOPHAGOGASTRODUODENOSCOPY) N/A 3/11/2014    Performed by Federico Escobar MD at [facility] ENDO (4TH FLR)    EGD (ESOPHAGOGASTRODUODENOSCOPY) N/A 11/5/2013    Performed by [facility]obar [facility] at [facility] ENDO (4TH FLR)    ESOPHAGOGASTRODUODENOSCOPY (EGD) N/A 10/4/2017    [facility]    EYE SURGERY      INJECTION-STEROID-EPIDURAL-TRANSFORAMINAL Right 9/20/2017    Per[facility]elin Valdez [facility] at [facility] PAIN [facility]    JOINT REPLACEMENT      right knee    KNEE SURGERY Left 12/31/2013    TKR    left parotidectomy      mixed tumor    SALIVARY GLAND SURGERY      TONSILLECTOMY         Subjective     Communicated with RN prior to session.  Patient found supine upon PT entry to room, agreeable to evaluation.  Pt's daughter & sister present throughout session.  "Can I lay down now?"  Chief Complaint: weakness  Patient " comments/goals: to get better  Pain/Comfort:  · Pain Rating 1: 0/10  · Pain Rating Post-Intervention 1: 0/10    Objective:     Patient found with: pulse ox (continuous), telemetry, blood pressure cuff, oxygen, parikh catheter, peripheral IV     General Precautions: Standard, Cardiac fall, aspiration   Orthopedic Precautions:N/A   Braces: N/A   Respiratory Status: room air  Vital Signs (Most Recent):    Temp: 98.1 °F (36.7 °C) (12/26/18 1230)  Pulse: 93 (12/26/18 1400)  Resp: 20 (12/26/18 1400)  BP: (!) 104/53 (12/26/18 1400)  SpO2: 96 % (12/26/18 1400)    Exam:  · Mental Status: Patient is AxOx3 (with min cues) and follows all single-step verbal commands. Pt is Lethargic and Cooperative during session.  · Skin Integrity: Visible skin intact  · Edema: none noted  · Sensation: Intact  · Hearing: Intact  · Vision:  Intact  · Postural Exam: Patient presented with the following abnormalities: rounded shoulders, left lateral lean in sitting & standing  · Range of Motion:  · RUE: WFL  · LUE: WFL  · RLE: WFL  · LLE: WFL  · Strength Exam:  · Upper Extremity Strength: grossly 4/5  · Lower Extremity Strength: grossly 4/5    Functional Mobility:  Bed Mobility:   · Rolling/Turning to Right: minimum assistance  · Supine to Sit: minimum assistance; from right side of bed  · Scooting anteriorly to EOB to have both feet planted on floor: minimum assistance    Transfers:   · Sit <> Stand Transfer: minimum assistance with hand-held assist   · Stand <> Sit Transfer: minimum assistance with hand-held assist   · x1 from EOB; tactile cues for anterior weight shift  · Bed <> Chair Transfer: Stand Pivot technique with minimum assistance with hand-held assist  · Chair on patient's left; pt able to take 2-3 steps to chair, kyphotic posture      Gait:  · Patient ambulated: 4 steps to bedside chair   · Patient required: minimal assist  · Patient used:  hand-held assist  · Gait Pattern observed: 2-point gait  · Gait Deviation(s): decreased  dora, decreased step length, decreased stride length and decreased weight-shifting ability  · Gait Speed: not tested due to poor standing balance & endurance  · Impairments due to: decreased strength    Therapeutic Activities & Exercises:   Seated in bedside chair: BLE hip flexion x 1 minute. Pt required constant cues to continue participation in task.    Education:  Patient provided with daily orientation and goals of this PT session. Patient and family agreed to participate in session. Patient and family aware of patient's deficits and therapy progression. They were educated to transfer to bedside chair/bedside commode/bathroom with RN/PCT present. Encouraged patient to perform daily exercises & mobility to increase endurance and decrease effects of bedrest. Time provided for therapeutic counseling and discussion of health disposition. All questions answered to patient's and family's satisfaction, within scope of PT practice; voiced no other concerns. White board updated in patient's room, RN notified of session.    Patient left up in chair, with head in midline, neutral pelvis & heels floated for skin protection with all lines intact, call button in reach and RN notified.    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  She presents with the following impairments/functional limitations: decreased functional mobility, strength and balance. Pt was interactive and participatory during session, however required vc's to maintain alertness. Pt will require 24hr supervision and support upon discharge from hospital due to decreased safety awareness. Selma Lux MD's deficits effect their ability to safely and independently participate in self-care tasks and functional mobility which increases their caregiver burden. Due to her physical therapy diagnosis of debility and deconditioning, they  continue to benefit from acute PT services to address the following limitations: high fall risk, weakness, instability, and the need for supervised instruction of exercise. Selma Lux MD's deficits effect their roles and responsibilities in which they were able to complete prior to admit. Education was provided to patient & family regarding importance of continued participation in therapy, patient's progress and discharge disposition. Pt would benefit from an intensive and collaborative PT/OT/SLP therapy program to improve quality of life and focus on recovery of impairments.     Problem List: weakness, gait instability, impaired endurance, impaired balance, impaired self care skills, impaired functional mobilty, impaired cardiopulmonary response to activity, decreased safety awareness  Rehab Prognosis: Fair; patient would benefit from acute skilled PT services to address these deficits and reach maximum level of function.      GOALS:   Multidisciplinary Problems     Physical Therapy Goals        Problem: Physical Therapy Goal    Goal Priority Disciplines Outcome Goal Variances Interventions   Physical Therapy Goal     PT, PT/OT Ongoing (interventions implemented as appropriate)     Description:  Goals to be met by: 1/4/2019    Patient will increase functional independence with mobility by performing:    Supine <> sit with Stand-by Assistance.  Sit <> stand transfer with Contact Guard Assistance using Rolling Walker.  Bed <> chair transfer via Stand Pivot with Contact Guard Assistance using Rolling Walker.  Gait  x 100 feet with Minimal Assistance using Rolling Walker to prepare for community ambulation and endurance activities.  Able to tolerate exercise for 15-20 reps with assistance as needed.                         Clinical Decision Making:   On examination of body system using standardized tests and measures patient presents with 3 or more elements from any of the following: body structures and  functions, activity limitations, and/or participation restrictions.  Leading to a clinical presentation that is considered evolving with changing characteristics  Evaluation Complexity:  Moderate - 79446      Time Tracking:     PT Received On: 12/26/18  PT Start Time: 1116     PT Stop Time: 1152  PT Total Time (min): 36 min     Billable Minutes: Evaluation 20 and Therapeutic Activity 16    Radha Gillis PT, DPT  12/26/2018  Pager:[phone]

## 2018-12-26 NOTE — PLAN OF CARE
Problem: Physical Therapy Goal  Goal: Physical Therapy Goal  Goals to be met by: 1/4/2019    Patient will increase functional independence with mobility by performing:    Supine <> sit with Stand-by Assistance.  Sit <> stand transfer with Contact Guard Assistance using Rolling Walker.  Bed <> chair transfer via Stand Pivot with Contact Guard Assistance using Rolling Walker.  Gait  x 100 feet with Minimal Assistance using Rolling Walker to prepare for community ambulation and endurance activities.  Able to tolerate exercise for 15-20 reps with assistance as needed.      Outcome: Ongoing (interventions implemented as appropriate)    Pt would benefit from SNF (vs.  with 24hr supervision) upon discharge.  Appropriate transfer level with nursing staff: Bed <> Chair:  Stand Pivot with minimum assistance and of 1 persons.    Radha Gillis PT, DPT  12/26/2018  Pager: [phone]

## 2018-12-26 NOTE — ASSESSMENT & PLAN NOTE
-- baseline Creatine 1.1.12/20 FC was inserted due to retention.   -- Cr is improving today   -- Urine lytes ordered- urine sodium <20, creatinine 174  -- strict in and out. Please  Match I/O  -- avoid nephrotoxic medication. Properly renally dosed antibiotics.

## 2018-12-27 LAB
ALBUMIN SERPL BCP-MCNC: 3 G/DL
ALP SERPL-CCNC: 60 U/L
ALT SERPL W/O P-5'-P-CCNC: 18 U/L
ANION GAP SERPL CALC-SCNC: 8 MMOL/L
ANISOCYTOSIS BLD QL SMEAR: SLIGHT
AST SERPL-CCNC: 12 U/L
B BURGDOR AB CSF IA-ACNC: 0.21 LIV
BASOPHILS # BLD AUTO: 0.04 K/UL
BASOPHILS NFR BLD: 0.2 %
BILIRUB SERPL-MCNC: 0.5 MG/DL
BUN SERPL-MCNC: 51 MG/DL
CALCIUM SERPL-MCNC: 9.7 MG/DL
CHLORIDE SERPL-SCNC: 114 MMOL/L
CO2 SERPL-SCNC: 23 MMOL/L
CREAT SERPL-MCNC: 1.3 MG/DL
DIFFERENTIAL METHOD: ABNORMAL
EOSINOPHIL # BLD AUTO: 0.1 K/UL
EOSINOPHIL NFR BLD: 0.8 %
ERYTHROCYTE [DISTWIDTH] IN BLOOD BY AUTOMATED COUNT: 15.3 %
EST. GFR  (AFRICAN AMERICAN): 44.5 ML/MIN/1.73 M^2
EST. GFR  (NON AFRICAN AMERICAN): 38.6 ML/MIN/1.73 M^2
GLUCOSE SERPL-MCNC: 101 MG/DL
HCT VFR BLD AUTO: 40.5 %
HGB BLD-MCNC: 12.5 G/DL
HSV1, PCR, CSF: NEGATIVE
HSV2, PCR, CSF: NEGATIVE
IMM GRANULOCYTES # BLD AUTO: 0.35 K/UL
IMM GRANULOCYTES NFR BLD AUTO: 2.1 %
LYMPHOCYTES # BLD AUTO: 2.7 K/UL
LYMPHOCYTES NFR BLD: 16 %
MAGNESIUM SERPL-MCNC: 2.1 MG/DL
MCH RBC QN AUTO: 27.9 PG
MCHC RBC AUTO-ENTMCNC: 30.9 G/DL
MCV RBC AUTO: 90 FL
MONOCYTES # BLD AUTO: 3.2 K/UL
MONOCYTES NFR BLD: 19 %
NEUTROPHILS # BLD AUTO: 10.2 K/UL
NEUTROPHILS NFR BLD: 61.9 %
NRBC BLD-RTO: 0 /100 WBC
PHOSPHATE SERPL-MCNC: 1.9 MG/DL
PLATELET # BLD AUTO: 218 K/UL
PLATELET BLD QL SMEAR: ABNORMAL
PMV BLD AUTO: 10.5 FL
POTASSIUM SERPL-SCNC: 3.8 MMOL/L
PROT SERPL-MCNC: 6.4 G/DL
RBC # BLD AUTO: 4.48 M/UL
SODIUM SERPL-SCNC: 145 MMOL/L
WBC # BLD AUTO: 16.59 K/UL

## 2018-12-27 PROCEDURE — 25000003 PHARM REV CODE 250: Performed by: STUDENT IN AN ORGANIZED HEALTH CARE EDUCATION/TRAINING PROGRAM

## 2018-12-27 PROCEDURE — 25000003 PHARM REV CODE 250: Performed by: INTERNAL MEDICINE

## 2018-12-27 PROCEDURE — 63600175 PHARM REV CODE 636 W HCPCS: Performed by: INTERNAL MEDICINE

## 2018-12-27 PROCEDURE — 99232 PR SUBSEQUENT HOSPITAL CARE,LEVL II: ICD-10-PCS | Mod: GC,,, | Performed by: INTERNAL MEDICINE

## 2018-12-27 PROCEDURE — 93010 ELECTROCARDIOGRAM REPORT: CPT | Mod: ,,, | Performed by: INTERNAL MEDICINE

## 2018-12-27 PROCEDURE — 99232 SBSQ HOSP IP/OBS MODERATE 35: CPT | Mod: GC,,, | Performed by: INTERNAL MEDICINE

## 2018-12-27 PROCEDURE — 93005 ELECTROCARDIOGRAM TRACING: CPT

## 2018-12-27 PROCEDURE — 63600175 PHARM REV CODE 636 W HCPCS: Performed by: STUDENT IN AN ORGANIZED HEALTH CARE EDUCATION/TRAINING PROGRAM

## 2018-12-27 PROCEDURE — 97530 THERAPEUTIC ACTIVITIES: CPT

## 2018-12-27 PROCEDURE — 11000001 HC ACUTE MED/SURG PRIVATE ROOM

## 2018-12-27 PROCEDURE — 93010 EKG 12-LEAD: ICD-10-PCS | Mod: ,,, | Performed by: INTERNAL MEDICINE

## 2018-12-27 PROCEDURE — 84100 ASSAY OF PHOSPHORUS: CPT

## 2018-12-27 PROCEDURE — 85025 COMPLETE CBC W/AUTO DIFF WBC: CPT

## 2018-12-27 PROCEDURE — 83735 ASSAY OF MAGNESIUM: CPT

## 2018-12-27 PROCEDURE — 80053 COMPREHEN METABOLIC PANEL: CPT

## 2018-12-27 RX ORDER — SULFAMETHOXAZOLE AND TRIMETHOPRIM 800; 160 MG/1; MG/1
1 TABLET ORAL DAILY
Status: DISCONTINUED | OUTPATIENT
Start: 2018-12-27 | End: 2018-12-28

## 2018-12-27 RX ORDER — CARVEDILOL 12.5 MG/1
12.5 TABLET ORAL 2 TIMES DAILY WITH MEALS
Status: DISCONTINUED | OUTPATIENT
Start: 2018-12-27 | End: 2018-12-31 | Stop reason: HOSPADM

## 2018-12-27 RX ORDER — HYDROXYCHLOROQUINE SULFATE 200 MG/1
400 TABLET, FILM COATED ORAL DAILY
Status: DISCONTINUED | OUTPATIENT
Start: 2018-12-27 | End: 2018-12-31 | Stop reason: HOSPADM

## 2018-12-27 RX ORDER — SODIUM,POTASSIUM PHOSPHATES 280-250MG
2 POWDER IN PACKET (EA) ORAL
Status: DISCONTINUED | OUTPATIENT
Start: 2018-12-27 | End: 2018-12-30

## 2018-12-27 RX ORDER — DEXTROSE 50 % IN WATER (D50W) INTRAVENOUS SYRINGE
Status: DISPENSED
Start: 2018-12-27 | End: 2018-12-28

## 2018-12-27 RX ADMIN — SULFAMETHOXAZOLE AND TRIMETHOPRIM 1 TABLET: 800; 160 TABLET ORAL at 03:12

## 2018-12-27 RX ADMIN — ACYCLOVIR SODIUM 520 MG: 50 INJECTION, SOLUTION INTRAVENOUS at 10:12

## 2018-12-27 RX ADMIN — ACYCLOVIR SODIUM 520 MG: 50 INJECTION, SOLUTION INTRAVENOUS at 09:12

## 2018-12-27 RX ADMIN — PREDNISONE 25 MG: 5 TABLET ORAL at 09:12

## 2018-12-27 RX ADMIN — POTASSIUM & SODIUM PHOSPHATES POWDER PACK 280-160-250 MG 2 PACKET: 280-160-250 PACK at 03:12

## 2018-12-27 RX ADMIN — HEPARIN SODIUM 5000 UNITS: 5000 INJECTION, SOLUTION INTRAVENOUS; SUBCUTANEOUS at 02:12

## 2018-12-27 RX ADMIN — HYDROXYCHLOROQUINE SULFATE 400 MG: 200 TABLET, FILM COATED ORAL at 03:12

## 2018-12-27 RX ADMIN — HEPARIN SODIUM 5000 UNITS: 5000 INJECTION, SOLUTION INTRAVENOUS; SUBCUTANEOUS at 06:12

## 2018-12-27 RX ADMIN — CARVEDILOL 12.5 MG: 12.5 TABLET, FILM COATED ORAL at 06:12

## 2018-12-27 RX ADMIN — POTASSIUM & SODIUM PHOSPHATES POWDER PACK 280-160-250 MG 2 PACKET: 280-160-250 PACK at 10:12

## 2018-12-27 RX ADMIN — CLOPIDOGREL 75 MG: 75 TABLET, FILM COATED ORAL at 09:12

## 2018-12-27 RX ADMIN — ASPIRIN 81 MG CHEWABLE TABLET 81 MG: 81 TABLET CHEWABLE at 09:12

## 2018-12-27 RX ADMIN — HEPARIN SODIUM 5000 UNITS: 5000 INJECTION, SOLUTION INTRAVENOUS; SUBCUTANEOUS at 10:12

## 2018-12-27 RX ADMIN — SENNOSIDES AND DOCUSATE SODIUM 1 TABLET: 8.6; 5 TABLET ORAL at 09:12

## 2018-12-27 NOTE — SUBJECTIVE & OBJECTIVE
Subjective:     Interval History: Pleasant this AM, oriented and conversant. Patient was stepped down to the floor overnight.   Redirectable when slightly confused, per nursing staff.     Current Neurological Medications:   Acyclovir 520 mg BID   Prednisone 25 mg QD    Current Facility-Administered Medications   Medication Dose Route Frequency Provider Last Rate Last Dose    acetaminophen suppository 650 mg  650 mg Rectal Q6H PRN Kristen Correa MD   650 mg at 12/20/18 0828    acyclovir (ZOVIRAX) 520 mg in dextrose 5 % 100 mL IVPB  10 mg/kg (Ideal) Intravenous Q12H Jeremiah Barajas  mL/hr at 12/27/18 0955 520 mg at 12/27/18 0955    aspirin chewable tablet 81 mg  81 mg Per NG tube Daily Karyn Balbuena MD   81 mg at 12/27/18 0954    carvedilol tablet 12.5 mg  12.5 mg Oral BID WM Kiran Perez MD        clopidogrel tablet 75 mg  75 mg Per NG tube Daily Karyn Balbuena MD   75 mg at 12/27/18 0954    dextrose 50 % in water (D50W) injection 25 g  25 g Intravenous PRN Karen Toledo MD        dextrose 50% injection 12.5 g  12.5 g Intravenous PRN MERARY Anguiano MD        glucagon (human recombinant) injection 1 mg  1 mg Intramuscular PRN Karen Toledo MD        heparin (porcine) injection 5,000 Units  5,000 Units Subcutaneous Q8H [facility] FIDELINA Monte MD   5,000 Units at 12/27/18 1400    hydroxychloroquine tablet 400 mg  400 mg Oral Daily Kiran Perez MD   400 mg at 12/27/18 1546    ondansetron injection 4 mg  4 mg Intravenous Q6H PRN Shelli Bernstein NP   4 mg at 12/18/18 1845    potassium, sodium phosphates 280-160-250 mg packet 2 packet  2 packet Oral QID (AC & HS) Stef Eagle MD   2 packet at 12/27/18 1546    predniSONE tablet 25 mg  25 mg Oral Daily Rosana Wright MD   25 mg at 12/27/18 0954    senna-docusate 8.6-50 mg per tablet 1 tablet  1 tablet Oral BID Rosana Wright MD   1 tablet at 12/27/18 0950    sodium chloride 0.9% flush 5 mL  5 mL Intravenous PRN  "Karen Toledo MD        sulfamethoxazole-trimethoprim 800-160mg per tablet 1 tablet  1 tablet Oral Daily Kiran Perez MD   1 tablet at 12/27/18 1546       Review of Systems   Constitutional: Positive for activity change, fatigue and fever.   HENT: Negative.    Gastrointestinal: Negative for nausea and vomiting.   Endocrine: Negative.    Genitourinary: Negative.    Skin: Negative.    Allergic/Immunologic: Negative.    Neurological: Positive for weakness.   Psychiatric/Behavioral: Positive for agitation and confusion.     Objective:     Vital Signs (Most Recent):  Temp: 97.8 °F (36.6 °C) (12/27/18 1559)  Pulse: 94 (12/27/18 1609)  Resp: 17 (12/27/18 1559)  BP: (!) 165/72 (12/27/18 1559)  SpO2: 98 % (12/27/18 1559) Vital Signs (24h Range):  Temp:  [97 °F (36.1 °C)-99.3 °F (37.4 °C)] 97.8 °F (36.6 °C)  Pulse:  [] 94  Resp:  [14-25] 17  SpO2:  [95 %-100 %] 98 %  BP: (117-165)/(56-72) 165/72     Weight: 72.4 kg (159 lb 9.8 oz)  Body mass index is 28.27 kg/m².    Physical Exam  General:  Well-developed, well-nourished, NAD  HEENT:  NCAT, PERRLA, oropharyngeal membranes non-erythematous/without exudate.  Neck:  Supple, normal ROM without nuchal rigidity  Resp:  Symmetric expansion, no increased wob  CVS:  No LE edema, peripheral pulses 2+ (radial, dorsalis pedis)  GI:  Abd soft, non-distended, non-tender to palpation  Neurologic Exam:  Mental Status:  A&Ox4.. Follows commands appropriately, and with ease this AM  Cranial Nerves:  PERRLA, EOMI, no deficits noted. Facial movements symmetric and present. Shoulder shrug 5/5   Motor:  Normal bulk and tone. Moves extremities spontaneously against gravity, but states she "doesn't want to do anything else".  Sensory:Intact throughout in face, UE and LE  Reflexes: R Biceps, brachioradialis, patellar, Achilles 1+.    L Biceps, brachioradialis 2+, patellar, Achilles 2+  Coordination:  Follows some directions, but refuses to follow others as she is "tired of this". No resting " tremor.   Gait: Deferred       Significant Labs:   Hemoglobin A1c: No results for input(s): HGBA1C in the last 720 hours.  Blood Culture:   No results for input(s): LABBLOO in the last 48 hours.  BMP:   Recent Labs   Lab 12/26/18 0228 12/27/18  0253   * 101    145   K 3.6 3.8   * 114*   CO2 20* 23   BUN 48* 51*   CREATININE 1.3 1.3   CALCIUM 8.9 9.7   MG 2.2 2.1     CBC:   Recent Labs   Lab 12/26/18 0228 12/27/18 0253   WBC 13.38* 16.59*   HGB 11.3* 12.5   HCT 38.0 40.5    218     CMP:   Recent Labs   Lab 12/26/18 0228 12/27/18 0253   * 101    145   K 3.6 3.8   * 114*   CO2 20* 23   BUN 48* 51*   CREATININE 1.3 1.3   CALCIUM 8.9 9.7   MG 2.2 2.1   PROT 5.8* 6.4   ALBUMIN 2.8* 3.0*   BILITOT 0.4 0.5   ALKPHOS 48* 60   AST 15 12   ALT 19 18   ANIONGAP 8 8   EGFRNONAA 38.6* 38.6*     CSF Culture:   No results for input(s): CSFCULTURE in the last 48 hours.  CSF Studies:   No results for input(s): ALIQUT, APPEARCSF, COLORCSF, CSFWBC, CSFRBC, GLUCCSF, LDHCSF, PROTEINCSF, VDRLCSF in the last 48 hours.  Inflammatory Markers:   No results for input(s): SEDRATE, CRP, PROCAL in the last 48 hours.  POCT Glucose:   No results for input(s): POCTGLUCOSE in the last 24 hours.  Prealbumin: No results for input(s): PREALBUMIN in the last 48 hours.  Respiratory Culture: No results for input(s): GSRESP, RESPIRATORYC in the last 48 hours.  Urine Culture: No results for input(s): LABURIN in the last 48 hours.  Urine Studies:   No results for input(s): COLORU, APPEARANCEUA, PHUR, SPECGRAV, PROTEINUA, GLUCUA, KETONESU, BILIRUBINUA, OCCULTUA, NITRITE, UROBILINOGEN, LEUKOCYTESUR, RBCUA, WBCUA, BACTERIA, SQUAMEPITHEL, HYALINECASTS in the last 48 hours.    Invalid input(s): LATESHA  All pertinent lab results from the past 24 hours have been reviewed.    Significant Imaging: I have reviewed all pertinent imaging results/findings within the past 24 hours.

## 2018-12-27 NOTE — ASSESSMENT & PLAN NOTE
· Head CT and MRI negative for acute process. Repeat MRI negative which shows no acute intracranial abnormality.   · Takes Coreg 12.5mg PO BID at home.       Resume home coreg today

## 2018-12-27 NOTE — ASSESSMENT & PLAN NOTE
84 y/o F with RA (chronically on steroids, plaquenil and sulfasalazine), history of multiple TIA's (on aspirin and plavix),  And vascular dementia presents to the ED on 12/17/18 with a change in her mentation status with blurry vision (baseline able to perform all ADLS exceptionally) as well as right sided leg pain. The patient denies any numbness or tingling in any of her extremities. Of note, the patient ran out of her prednisone 2 days prior to admission. She has been taking 25 mg daily. On arrival to the ED patient had SBP in the 220 (BL -130). She received 2 doses of Labetalol 20mg IV x 2 with improvement in her blood pressure to the 180s.  Head CT and MRI in the ED were negative for any acute process. EEG 12/19 showed toxic medical encephalopathy without epileptic waves. Repeat MRI does not show any intracranial processes.     Plan  1) IR to obtain LP as we had three failed attempts on the floor. On 12/21 patient did not obtain LP on patient as she lost peripheral access when she went downstairs. Patient mental status is waxing and waning. S/p LP 12/24. 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  stopped Cefepime 12/26/18  2) Encephalopathy initially thought to be hypertensive as patient had blurry vision as well as hypertension. CT and MRI negative for stroke. BP control, pt unable to take orally. On IV hydralazine prn for  SBP>180. Reviewed images with radiology again and images do not indicate PRES or temporal lobe enhancement.   3)stopped vancomycin 12/25 (CSF unrevealing so far) continue cefepime, and acyclovir. Anticipation to stop Cefepime tomorrow 12/26   4) stopped trazodone 12/25   5) EEG twice were negative for seizure activity.   6) Neurology following up and at this time, we are treating for viral encephalitis    HSV,VZV and VDRL pending.    Continue acyclovir   EEG ordered

## 2018-12-27 NOTE — PROGRESS NOTES
Was called to room by  for patient having worked her way to a sitting up on side of bed , definitely not stable position. She was resistant and somewhat ornery r/t safety issues. After slight discussion I was able to convince her to lie back down. Bed alarm set. STAT labs drawn. Results pending.

## 2018-12-27 NOTE — PLAN OF CARE
Rounded with IM3. Patient seen by therapy and they are recommending SNF on discharge. Patient is agreeable, requests talking with her daughter, Susu. EBONIE Del Cid updated. Will follow.

## 2018-12-27 NOTE — SUBJECTIVE & OBJECTIVE
Interval History: no acute issues overnight, still disoriented to time and location, but easily reorientable. Follows command appropriately    Review of Systems  Objective:     Vital Signs (Most Recent):  Temp: 97 °F (36.1 °C) (12/27/18 1138)  Pulse: 81 (12/27/18 1138)  Resp: 16 (12/27/18 1138)  BP: 128/60 (12/27/18 1138)  SpO2: 96 % (12/27/18 1138) Vital Signs (24h Range):  Temp:  [97 °F (36.1 °C)-99.3 °F (37.4 °C)] 97 °F (36.1 °C)  Pulse:  [] 81  Resp:  [14-25] 16  SpO2:  [95 %-100 %] 96 %  BP: (114-165)/(55-67) 128/60     Weight: 72.4 kg (159 lb 9.8 oz)  Body mass index is 28.27 kg/m².    Intake/Output Summary (Last 24 hours) at 12/27/2018 1435  Last data filed at 12/27/2018 0600  Gross per 24 hour   Intake 336 ml   Output 990 ml   Net -654 ml      Physical Exam   Constitutional: She is oriented to person, place, and time. No distress.   HENT:   Head: Normocephalic and atraumatic.   Mouth/Throat: Oropharynx is clear and moist. No oropharyngeal exudate.   Eyes: EOM are normal. Pupils are equal, round, and reactive to light. Right eye exhibits no discharge. Left eye exhibits no discharge. No scleral icterus.   Neck: Normal range of motion. Neck supple. No JVD present. No tracheal deviation present.   Cardiovascular: Normal rate, regular rhythm and normal heart sounds. Exam reveals no gallop and no friction rub.   No murmur heard.  Pulmonary/Chest: Effort normal and breath sounds normal. No stridor. No respiratory distress. She has no wheezes.   Abdominal: Soft. She exhibits distension. She exhibits no mass. There is no tenderness. There is no guarding.   Musculoskeletal: Normal range of motion. She exhibits no edema or deformity.   Lymphadenopathy:     She has no cervical adenopathy.   Neurological: She is alert and oriented to person, place, and time. She displays normal reflexes. She exhibits abnormal muscle tone. Coordination normal.   Follows commands, very mild weakness on the right side from prior  stroke   Skin: Skin is warm and dry. Capillary refill takes less than 2 seconds. She is not diaphoretic.       Significant Labs:   Recent Lab Results       12/27/18  0253        Immature Granulocytes 2.1     Immature Grans (Abs) 0.35  Comment:  Mild elevation in immature granulocytes is non specific and   can be seen in a variety of conditions including stress response,   acute inflammation, trauma and pregnancy. Correlation with other   laboratory and clinical findings is essential.       Albumin 3.0     Alkaline Phosphatase 60     ALT 18     Anion Gap 8     Aniso Slight     AST 12     Baso # 0.04     Basophil% 0.2     Total Bilirubin 0.5  Comment:  For infants and newborns, interpretation of results should be based  on gestational age, weight and in agreement with clinical  observations.  Premature Infant recommended reference ranges:  Up to 24 hours.............<8.0 mg/dL  Up to 48 hours............<12.0 mg/dL  3-5 days..................<15.0 mg/dL  6-29 days.................<15.0 mg/dL       BUN, Bld 51     Calcium 9.7     Chloride 114     CO2 23     Creatinine 1.3     Differential Method Automated     eGFR if African American 44.5     eGFR if non  38.6  Comment:  Calculation used to obtain the estimated glomerular filtration  rate (eGFR) is the CKD-EPI equation.        Eos # 0.1     Eosinophil% 0.8     Glucose 101     Gran # (ANC) 10.2     Gran% 61.9     Hematocrit 40.5     Hemoglobin 12.5     Lymph # 2.7     Lymph% 16.0     Magnesium 2.1     MCH 27.9     MCHC 30.9     MCV 90     Mono # 3.2     Mono% 19.0     MPV 10.5     nRBC 0     Phosphorus 1.9     Platelet Estimate Appears normal     Platelets 218     Potassium 3.8     Total Protein 6.4     RBC 4.48     RDW 15.3     Sodium 145     WBC 16.59         All pertinent labs within the past 24 hours have been reviewed.    Significant Imaging: I have reviewed all pertinent imaging results/findings within the past 24 hours.

## 2018-12-27 NOTE — PROGRESS NOTES
[facility] Hospital Medicine  Progress Note    Patient Name: Selma Alonzo Lux MD  MRN: [id number]  Patient Class: IP- Inpatient   Admission Date: 12/16/2018  Length of Stay: 8 days  Attending Physician: Isis Singh MD  Primary Care Provider: Bhargav Hirsch MD    [facility] Medicine Team: [facility] 3 Kiran Perez MD    Subjective:     Principal Problem:Acute encephalopathy    HPI:  Ms. Hahn Alonzo Lux MD is a 83 y.o. female with rheumatoid arthritis on steroids, Plaquenil and Sulfasalazine, history of stroke on Aspirin/Plavix, and vascular dementia who presents to the emergency department with her daughter because of confusion.  History is obtained from the daughter, as the patient is not able to provide a good history.  The daughter mentions that at baseline, the patient is very independent and is very sharp.  However, the day prior to admission the patient was endorsing some blurred vision, having difficulty eating saying that she could not see her plate, and was having unusual behavior-like sitting on her bedroom floor, claiming that someone moved her bed causing her to fall.  She has been having tangential speech requiring frequent redirection.  Additionally, she was complaining of some right-sided leg pain, which is unusual, as she normally complaints of left-sided leg pain.  The patient denies any numbness or tingling in any of her extremities.  The patient was just started sulfasalazine yesterday, to be additional therapy for her rheumatoid arthritis.  Of note, the patient ran out of her prednisone 2 days prior to admission.  She has been taking 25 mg daily, and took her dose this morning.     The in the emergency department, she was found to have a blood pressure of 220 on arrival.  Daughter reports that her baseline blood pressure runs low, 110-130.  She received 2 doses of Labetalol 20mg IV x 2 with improvement in her blood pressure to the 180s.  Head CT and MRI were  negative for any acute process.  Labs were notable for a trop 0.025.  She was admitted to Hospital Medicine for further management.           Hospital Course:  Hospital Course  Admitted to ICU with Encephalopathy with delirium etiology of encephalopathy unclear but likely viral encephalitis.s/p IR guided LP (12/24/18). Cell count in CSF not consistent with bacterial meningitis, continue on acyclovir but d/c vanc and cefipime, remains afebrile and WBC stable.  repeated EEG x2 with no signs of seizure activity , MRI not consistent with PRESS, or meningeal enhancement . Acute on chronic renal failure, cr improving. Immune compromised due to chronic Plaquenil (on hold) and  Steroid use for Rheumatoid arthritis      LP 12/24/18 12/27: stepped down to floor, continue acyclovir, resume coreg and HCQ, continue to hold sulfasalazine        Interval History: no acute issues overnight, still disoriented to time and location, but easily reorientable. Follows command appropriately    Review of Systems  Objective:     Vital Signs (Most Recent):  Temp: 97 °F (36.1 °C) (12/27/18 1138)  Pulse: 81 (12/27/18 1138)  Resp: 16 (12/27/18 1138)  BP: 128/60 (12/27/18 1138)  SpO2: 96 % (12/27/18 1138) Vital Signs (24h Range):  Temp:  [97 °F (36.1 °C)-99.3 °F (37.4 °C)] 97 °F (36.1 °C)  Pulse:  [] 81  Resp:  [14-25] 16  SpO2:  [95 %-100 %] 96 %  BP: (114-165)/(55-67) 128/60     Weight: 72.4 kg (159 lb 9.8 oz)  Body mass index is 28.27 kg/m².    Intake/Output Summary (Last 24 hours) at 12/27/2018 1435  Last data filed at 12/27/2018 0600  Gross per 24 hour   Intake 336 ml   Output 990 ml   Net -654 ml      Physical Exam   Constitutional: She is oriented to person, place, and time. No distress.   HENT:   Head: Normocephalic and atraumatic.   Mouth/Throat: Oropharynx is clear and moist. No oropharyngeal exudate.   Eyes: EOM are normal. Pupils are equal, round, and reactive to light. Right eye exhibits no discharge. Left eye exhibits no  discharge. No scleral icterus.   Neck: Normal range of motion. Neck supple. No JVD present. No tracheal deviation present.   Cardiovascular: Normal rate, regular rhythm and normal heart sounds. Exam reveals no gallop and no friction rub.   No murmur heard.  Pulmonary/Chest: Effort normal and breath sounds normal. No stridor. No respiratory distress. She has no wheezes.   Abdominal: Soft. She exhibits distension. She exhibits no mass. There is no tenderness. There is no guarding.   Musculoskeletal: Normal range of motion. She exhibits no edema or deformity.   Lymphadenopathy:     She has no cervical adenopathy.   Neurological: She is alert and oriented to person, place, and time. She displays normal reflexes. She exhibits abnormal muscle tone. Coordination normal.   Follows commands, very mild weakness on the right side from prior stroke   Skin: Skin is warm and dry. Capillary refill takes less than 2 seconds. She is not diaphoretic.       Significant Labs:   Recent Lab Results       12/27/18  0253        Immature Granulocytes 2.1     Immature Grans (Abs) 0.35  Comment:  Mild elevation in immature granulocytes is non specific and   can be seen in a variety of conditions including stress response,   acute inflammation, trauma and pregnancy. Correlation with other   laboratory and clinical findings is essential.       Albumin 3.0     Alkaline Phosphatase 60     ALT 18     Anion Gap 8     Aniso Slight     AST 12     Baso # 0.04     Basophil% 0.2     Total Bilirubin 0.5  Comment:  For infants and newborns, interpretation of results should be based  on gestational age, weight and in agreement with clinical  observations.  Premature Infant recommended reference ranges:  Up to 24 hours.............<8.0 mg/dL  Up to 48 hours............<12.0 mg/dL  3-5 days..................<15.0 mg/dL  6-29 days.................<15.0 mg/dL       BUN, Bld 51     Calcium 9.7     Chloride 114     CO2 23     Creatinine 1.3     Differential  Method Automated     eGFR if African American 44.5     eGFR if non  38.6  Comment:  Calculation used to obtain the estimated glomerular filtration  rate (eGFR) is the CKD-EPI equation.        Eos # 0.1     Eosinophil% 0.8     Glucose 101     Gran # (ANC) 10.2     Gran% 61.9     Hematocrit 40.5     Hemoglobin 12.5     Lymph # 2.7     Lymph% 16.0     Magnesium 2.1     MCH 27.9     MCHC 30.9     MCV 90     Mono # 3.2     Mono% 19.0     MPV 10.5     nRBC 0     Phosphorus 1.9     Platelet Estimate Appears normal     Platelets 218     Potassium 3.8     Total Protein 6.4     RBC 4.48     RDW 15.3     Sodium 145     WBC 16.59         All pertinent labs within the past 24 hours have been reviewed.    Significant Imaging: I have reviewed all pertinent imaging results/findings within the past 24 hours.    Assessment/Plan:      * Acute encephalopathy    84 y/o F with RA (chronically on steroids, plaquenil and sulfasalazine), history of multiple TIA's (on aspirin and plavix),  And vascular dementia presents to the ED on 12/17/18 with a change in her mentation status with blurry vision (baseline able to perform all ADLS exceptionally) as well as right sided leg pain. The patient denies any numbness or tingling in any of her extremities. Of note, the patient ran out of her prednisone 2 days prior to admission. She has been taking 25 mg daily. On arrival to the ED patient had SBP in the 220 (BL -130). She received 2 doses of Labetalol 20mg IV x 2 with improvement in her blood pressure to the 180s.  Head CT and MRI in the ED were negative for any acute process. EEG 12/19 showed toxic medical encephalopathy without epileptic waves. Repeat MRI does not show any intracranial processes.     Plan  1) IR to obtain LP as we had three failed attempts on the floor. On 12/21 patient did not obtain LP on patient as she lost peripheral access when she went downstairs. Patient mental status is waxing and waning. S/p LP  12/24. 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  stopped Cefepime 12/26/18  2) Encephalopathy initially thought to be hypertensive as patient had blurry vision as well as hypertension. CT and MRI negative for stroke. BP control, pt unable to take orally. On IV hydralazine prn for  SBP>180. Reviewed images with radiology again and images do not indicate PRES or temporal lobe enhancement.   3)stopped vancomycin 12/25 (CSF unrevealing so far) continue cefepime, and acyclovir. Anticipation to stop Cefepime tomorrow 12/26   4) stopped trazodone 12/25   5) EEG twice were negative for seizure activity.   6) Neurology following up and at this time, we are treating for viral encephalitis    HSV,VZV and VDRL pending.     Continue acyclovir   EEG ordered        Acute renal failure superimposed on stage 3 chronic kidney disease    -- baseline Creatine 1.1.12/20 FC was inserted due to retention.   -- Cr is improving today   -- Urine lytes ordered- urine sodium <20, creatinine 174  -- strict in and out. Please  Match I/O  -- avoid nephrotoxic medication. Properly renally dosed antibiotics.     Long-term use of immunosuppressant medication    · Follows with Dr. Rouse of Rheumatology    S/p 8 days of iv solumedrol 20mg daily while in the ICU  · On Prednisone 25mg PO daily , need tapering to baseline 5mg per day  · Hold Sulfasalazine 1g PO BID while still pending workup results for viral encephalitis  · Resume Plaquenil 400mg PO daily  · Resume Bactrim for ppx       Seropositive rheumatoid arthritis of multiple sites           Hypertension, essential    · Head CT and MRI negative for acute process. Repeat MRI negative which shows no acute intracranial abnormality.   · Takes Coreg 12.5mg PO BID at home.       Resume home coreg today     Vascular dementia           Poor nutrition    Boost  Speech eval: Dental Soft, Liquid Diet Level: Thin            Delirium superimposed on dementia    Delirium precautions       Dystonia    · Follows  with Dr. Giang of Neurology  · Hold Gabapentin and Baclofen  for now given AMS            VTE Risk Mitigation (From admission, onward)        Ordered     heparin (porcine) injection 5,000 Units  Every 8 hours      12/25/18 1045     Place sequential compression device  Until discontinued      12/21/18 1024     IP VTE HIGH RISK PATIENT  Once      12/16/18 1933              Kiran Perez MD  Department of Hospital Medicine   [facility]

## 2018-12-27 NOTE — ASSESSMENT & PLAN NOTE
· Follows with Dr. Rouse of Rheumatology    S/p 8 days of iv solumedrol 20mg daily while in the ICU  · On Prednisone 25mg PO daily , need tapering to baseline 5mg per day  · Hold Sulfasalazine 1g PO BID while still pending workup results for viral encephalitis  · Resume Plaquenil 400mg PO daily  · Resume Bactrim for ppx

## 2018-12-27 NOTE — PT/OT/SLP PROGRESS
"Physical Therapy Treatment    Patient Name:  Selma Alonzo Lux MD   MRN:  [id number]  Admitting Diagnosis: Acute encephalopathy  Recent Surgery: * No surgery found *      Recommendations:     Discharge Recommendations:  nursing facility, skilled   Discharge Equipment Recommendations: none   Barriers to discharge: Decreased caregiver support    Plan:     During this hospitalization, patient to be seen 4 x/week to address the listed problems via gait training, therapeutic activities, therapeutic exercises, neuromuscular re-education  · Plan of Care Expires:  01/25/19   Plan of Care Reviewed with: patient    This Plan of care has been discussed with the patient who was involved in its development and understands and is in agreement with the identified goals and treatment plan    Subjective     Communicated with RN prior to session.  Patient found supine upon PT entry to room.   "Have you seen my daughter Susu today?"    Pain/Comfort:  · Pain Rating 1: 0/10  · Pain Rating Post-Intervention 1: 0/10    Objective:     Patient found with: telemetry, peripheral IV   Mental Status: Patient is oriented to AxOx4 and follows all verbal, single-step commands. Patient is Alert and Cooperative during session.    General Precautions: Standard, Cardiac fall, dental soft   Orthopedic Precautions:N/A   Braces: N/A   Respiratory Status: room air  Vital Signs (Most Recent):    Temp: 97 °F (36.1 °C) (12/27/18 1138)  Pulse: 81 (12/27/18 1138)  Resp: 16 (12/27/18 1138)  BP: 128/60 (12/27/18 1138)  SpO2: 96 % (12/27/18 1138)    Functional Mobility:  Bed Mobility:   · Rolling/Turning to Right: minimum assistance  · Scooting to EOB to have both feet planted on floor: minimum assistance  · Supine to Sit: minimum assistance; from right side of bed  · Vc's for sequence and trunk elevation    Transfers:   · Sit <> Stand Transfer: contact guard assistance with rolling walker   · Stand <> Sit Transfer: contact guard assistance with rolling " walker   · x2 from EOB, x4 from bedside commode      Gait:  · Patient ambulated: 12ft, bedside commode, 15ft    · Patient required: minimal assist  · Patient used:  rolling walker  · Gait Pattern observed: 3-point gait  · Gait Deviation(s): decreased dora, decreased toe-to-floor clearance and decreased weight-shifting ability  · Impairments due to: decreased balance and endurance  · Comments: pt with kyphotic posture of RW, vc's for sequencing and to continue performing task. Pt with short step length bilaterally.    Therapeutic Activities & Exercises:   *pt requesting to use bedside commode during ambulation trial. pt able to perform perineal hygiene while standing with supervision from therapist.    Education:  Patient provided with daily orientation and goals of this PT session. Patient and family agreed to participate in session. Patient aware of patient's deficits and therapy progression. They were educated to transfer to bedside chair/bedside commode/bathroom with RN/PCT present. Encouraged patient to perform daily exercises & mobility to increase endurance and decrease effects of bedrest.Time provided for therapeutic counseling and discussion of health disposition. All questions answered to patient's satisfaction, within scope of PT practice; voiced no other concerns. White board updated in patient's room, RN notified of session.    Patient left up in chair, with head in midline, neutral pelvis & heels floated for skin protection with all lines intact, call button in reach and RN notified    AM-PAC 6 CLICK MOBILITY  Turning over in bed (including adjusting bedclothes, sheets and blankets)?: 3  Sitting down on and standing up from a chair with arms (e.g., wheelchair, bedside commode, etc.): 3  Moving from lying on back to sitting on the side of the bed?: 3  Moving to and from a bed to a chair (including a wheelchair)?: 3  Need to walk in hospital room?: 2  Climbing 3-5 steps with a railing?: 1  Basic  Mobility Total Score: 15     Assessment:     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   Patient is making progress towards goals, participating well in this session. Pt continues to require significant assist with ambulation due to imbalance. Pt with noted improvement with transfers with use of RW. Pt fatigued after session, quickly falling asleep. Selma Lux MD's deficits effect their ability to safely and independently participate in self-care tasks and functional mobility which increases their caregiver burden. Due to her physical therapy diagnosis of debility and deconditioning, they continue to benefit from acute PT services to address the following limitations: high fall risk, weakness, instability, the need for supervised instruction of exercise. Selma Lux MD's deficits effect their roles and responsibilities in which they were able to complete prior to admit. Education was provided to patient regarding importance of continued participation in therapy, patient's progress and discharge disposition. Pt would benefit from a collaborative PT/OT/SLP program to improve quality of life and focus on recovery of impairments.     Problem List: weakness, gait instability, impaired balance, impaired self care skills, impaired functional mobilty, impaired cognition, decreased safety awareness. weakness, gait instability, impaired balance, impaired self care skills, impaired functional mobilty, impaired cognition, decreased safety awareness  Rehab Prognosis: fair; patient would benefit from acute skilled PT services to address these deficits and reach maximum level of function.      GOALS:   Multidisciplinary Problems     Physical Therapy Goals        Problem: Physical Therapy Goal    Goal Priority Disciplines Outcome Goal Variances Interventions   Physical Therapy Goal     PT, PT/OT Ongoing (interventions implemented as appropriate)      Description:  Goals to be met by: 1/4/2019    Patient will increase functional independence with mobility by performing:    Supine <> sit with Stand-by Assistance.  Sit <> stand transfer with Contact Guard Assistance using Rolling Walker.  Bed <> chair transfer via Stand Pivot with Contact Guard Assistance using Rolling Walker.  Gait  x 100 feet with Minimal Assistance using Rolling Walker to prepare for community ambulation and endurance activities.  Able to tolerate exercise for 15-20 reps with assistance as needed.                        Time Tracking:     PT Received On: 12/27/18  PT Start Time: 1027     PT Stop Time: 1102  PT Total Time (min): 35 min     Billable Minutes:   · Therapeutic Activity 35    Treatment Type: Treatment  PT/PTA: PT       Radha Gillis PT, DPT  12/27/2018   Pager: [phone]

## 2018-12-27 NOTE — PROGRESS NOTES
[facility]  Neurology  Progress Note    Patient Name: Selma Rosado MD Jose J  MRN: [id number]  Admission Date: 12/16/2018  Hospital Length of Stay: 8 days  Code Status: Full Code   Attending Provider: Isis Singh MD  Primary Care Physician: Bhargav Hirsch MD   Principal Problem:Acute encephalopathy      Subjective:     Interval History: Pleasant this AM, oriented and conversant. Patient was stepped down to the floor overnight.   Redirectable when slightly confused, per nursing staff.     Current Neurological Medications:   Acyclovir 520 mg BID   Prednisone 25 mg QD    Current Facility-Administered Medications   Medication Dose Route Frequency Provider Last Rate Last Dose    acetaminophen suppository 650 mg  650 mg Rectal Q6H PRN Kristen Correa MD   650 mg at 12/20/18 0828    acyclovir (ZOVIRAX) 520 mg in dextrose 5 % 100 mL IVPB  10 mg/kg (Ideal) Intravenous Q12H Jeremiah Barajas  mL/hr at 12/27/18 0955 520 mg at 12/27/18 0955    aspirin chewable tablet 81 mg  81 mg Per NG tube Daily Karyn Balbuena MD   81 mg at 12/27/18 0954    carvedilol tablet 12.5 mg  12.5 mg Oral BID WM Kiran Perez MD        clopidogrel tablet 75 mg  75 mg Per NG tube Daily Karyn Balbuena MD   75 mg at 12/27/18 0954    dextrose 50 % in water (D50W) injection 25 g  25 g Intravenous PRN Karen Toledo MD        dextrose 50% injection 12.5 g  12.5 g Intravenous PRN MERARY Anguiano MD        glucagon (human recombinant) injection 1 mg  1 mg Intramuscular PRN Karen Toledo MD        heparin (porcine) injection 5,000 Units  5,000 Units Subcutaneous Q8H [facility] FIDELINA Monte MD   5,000 Units at 12/27/18 1400    hydroxychloroquine tablet 400 mg  400 mg Oral Daily Kiran Perez MD   400 mg at 12/27/18 1546    ondansetron injection 4 mg  4 mg Intravenous Q6H PRN Shelli Bernstein NP   4 mg at 12/18/18 1845    potassium, sodium phosphates 280-160-250 mg packet 2 packet  2 packet Oral QID (AC & HS)  Stef Eagle MD   2 packet at 12/27/18 1546    predniSONE tablet 25 mg  25 mg Oral Daily Rosana Wright MD   25 mg at 12/27/18 0954    senna-docusate 8.6-50 mg per tablet 1 tablet  1 tablet Oral BID Rosana Wright MD   1 tablet at 12/27/18 0954    sodium chloride 0.9% flush 5 mL  5 mL Intravenous PRN Karen Toledo MD        sulfamethoxazole-trimethoprim 800-160mg per tablet 1 tablet  1 tablet Oral Daily Kiran Perez MD   1 tablet at 12/27/18 1546       Review of Systems   Constitutional: Positive for activity change, fatigue and fever.   HENT: Negative.    Gastrointestinal: Negative for nausea and vomiting.   Endocrine: Negative.    Genitourinary: Negative.    Skin: Negative.    Allergic/Immunologic: Negative.    Neurological: Positive for weakness.   Psychiatric/Behavioral: Positive for agitation and confusion.     Objective:     Vital Signs (Most Recent):  Temp: 97.8 °F (36.6 °C) (12/27/18 1559)  Pulse: 94 (12/27/18 1609)  Resp: 17 (12/27/18 1559)  BP: (!) 165/72 (12/27/18 1559)  SpO2: 98 % (12/27/18 1559) Vital Signs (24h Range):  Temp:  [97 °F (36.1 °C)-99.3 °F (37.4 °C)] 97.8 °F (36.6 °C)  Pulse:  [] 94  Resp:  [14-25] 17  SpO2:  [95 %-100 %] 98 %  BP: (117-165)/(56-72) 165/72     Weight: 72.4 kg (159 lb 9.8 oz)  Body mass index is 28.27 kg/m².    Physical Exam  General:  Well-developed, well-nourished, NAD  HEENT:  NCAT, PERRLA, oropharyngeal membranes non-erythematous/without exudate.  Neck:  Supple, normal ROM without nuchal rigidity  Resp:  Symmetric expansion, no increased wob  CVS:  No LE edema, peripheral pulses 2+ (radial, dorsalis pedis)  GI:  Abd soft, non-distended, non-tender to palpation  Neurologic Exam:  Mental Status:  A&Ox4.. Follows commands appropriately, and with ease this AM  Cranial Nerves:  PERRLA, EOMI, no deficits noted. Facial movements symmetric and present. Shoulder shrug 5/5   Motor:  Normal bulk and tone. Moves extremities spontaneously against  "gravity, but states she "doesn't want to do anything else".  Sensory:Intact throughout in face, UE and LE  Reflexes: R Biceps, brachioradialis, patellar, Achilles 1+.    L Biceps, brachioradialis 2+, patellar, Achilles 2+  Coordination:  Follows some directions, but refuses to follow others as she is "tired of this". No resting tremor.   Gait: Deferred       Significant Labs:   Hemoglobin A1c: No results for input(s): HGBA1C in the last 720 hours.  Blood Culture:   No results for input(s): LABBLOO in the last 48 hours.  BMP:   Recent Labs   Lab 12/26/18 0228 12/27/18  0253   * 101    145   K 3.6 3.8   * 114*   CO2 20* 23   BUN 48* 51*   CREATININE 1.3 1.3   CALCIUM 8.9 9.7   MG 2.2 2.1     CBC:   Recent Labs   Lab 12/26/18 0228 12/27/18  0253   WBC 13.38* 16.59*   HGB 11.3* 12.5   HCT 38.0 40.5    218     CMP:   Recent Labs   Lab 12/26/18 0228 12/27/18  0253   * 101    145   K 3.6 3.8   * 114*   CO2 20* 23   BUN 48* 51*   CREATININE 1.3 1.3   CALCIUM 8.9 9.7   MG 2.2 2.1   PROT 5.8* 6.4   ALBUMIN 2.8* 3.0*   BILITOT 0.4 0.5   ALKPHOS 48* 60   AST 15 12   ALT 19 18   ANIONGAP 8 8   EGFRNONAA 38.6* 38.6*     CSF Culture:   No results for input(s): CSFCULTURE in the last 48 hours.  CSF Studies:   No results for input(s): ALIQUT, APPEARCSF, COLORCSF, CSFWBC, CSFRBC, GLUCCSF, LDHCSF, PROTEINCSF, VDRLCSF in the last 48 hours.  Inflammatory Markers:   No results for input(s): SEDRATE, CRP, PROCAL in the last 48 hours.  POCT Glucose:   No results for input(s): POCTGLUCOSE in the last 24 hours.  Prealbumin: No results for input(s): PREALBUMIN in the last 48 hours.  Respiratory Culture: No results for input(s): GSRESP, RESPIRATORYC in the last 48 hours.  Urine Culture: No results for input(s): LABURIN in the last 48 hours.  Urine Studies:   No results for input(s): COLORU, APPEARANCEUA, PHUR, SPECGRAV, PROTEINUA, GLUCUA, KETONESU, BILIRUBINUA, OCCULTUA, NITRITE, UROBILINOGEN, " LEUKOCYTESUR, RBCUA, WBCUA, BACTERIA, SQUAMEPITHEL, HYALINECASTS in the last 48 hours.    Invalid input(s): LATESHA  All pertinent lab results from the past 24 hours have been reviewed.    Significant Imaging: I have reviewed all pertinent imaging results/findings within the past 24 hours.     Assessment and Plan:     * Acute encephalopathy    Initial presentation of blurry vision, consistent w/ HTN encephalopathy. Patient waxed and waned from baseline to more confusing during her stay in the observation unit. Was more somnolent on 12/18 and oral medication was stopped 2/2 aspiration risk and some difficulty w/ BP control w/ IV meds due to availability of medication as well as observation status. Had worsening mentation throughout the day and developed a fever ( Tmax 102.6), tachycardia, increased RR and persistently elevated BP.  She was transferred to MICU for increased level of care overnight on 12/19.    Patient stepped down to medicine overnight. Continues to improve, w/ less variation in her mentation throughout the day; A&O x4 this morning. HSV PCR negative, can stop the acyclovir at this time and continue supportive care for this patient, which includes maintaining an appropriate sleep-wake cycle. No need for further imaging or EEGs as patient continues to approach baseline.     - MRI- no intracranial acute pathology   - LP- negative HSV PCR - ok to d/c Acyclovir   - No need for further imaging or EEG studies at this time.   - Patient is immunocompromised 2/2 RA medication (steroids, DMARD); likely viral encephalitis picture, which continues to improve  - Will sign off at this time, appreciate consult  - Patient to follow up w/ Dr. Tobias outpatient      History of stroke    - [facility] w/o acute intracranial changes on repeat imaging on 12/18  - Small remote lacunar type infarcts noted within the bilateral cerebellar hemispheres     Vascular dementia    - Follow up w/ Dr. Tobias  - Approaching baseline       Hypertension, essential    - Management per primary team   - Control of elevated BP significantly improved; SBP in 140's on interview and exam today          VTE Risk Mitigation (From admission, onward)        Ordered     heparin (porcine) injection 5,000 Units  Every 8 hours      12/25/18 1045     Place sequential compression device  Until discontinued      12/21/18 1024     IP VTE HIGH RISK PATIENT  Once      12/16/18 1933        Will sign off at this time. Appreciate consult. Please have patient follow up w/ Dr. Tobias outpatient   Nereida English MD  Neurology  [facility]    Case discussed with resident. Agree with plan, above.     Gael Arriaza MD, MARVEL, FAAN  Department of Neurology   [facility] [place], [place]

## 2018-12-27 NOTE — HOSPITAL COURSE
Patient was initially admitted to hospital medicine with confusion and hypertensive emergency. Neurology and Psychiatry were both consulted. Psychiatry had attributed altered mental status to a medical etiology. Neurology had considered hypertensive encephalopathy along with an infectious etiology and continued to follow patient throughout most of hospital stay. Patient was stepped up to the ICU on hospital day 4 (12/19) for somnolence and fever. Acute encephalopathy with etiology was unclear but likely due to viral encephalitis as patient had an IR guided LP on 12/24/18 that showed cell count CSF not consistent with bacterial meningitis, so she was continued on acyclovir but other antibiotics such as vancomycin and cefipime were discontinued. She remained afebrile with stable WBC stable. Stress dose steroids were started, which were tapered down. Other workup included EEG, which was repeated twice with no signs of seizure activity. MRI was not consistent with PRESS, or meningeal enhancement . She had developed acute on chronic renal failure with Cr improving throughout hospital course. Her immunosuppressive medications for RA were held for concern of infectious etiology as cause of acute encephalopathy, and steroid taper was started.      On hospital day 12 (12/27), she was stepped down to the hospital floor due to improved mental status and blood pressure, and acyclovir was continued. UA was suggestive of UTI, and she was given 2 doses of ceftriaxone. Bactrim was discontinued the next day as patient no longer was on high dose steroids with resumption of home Lasix 40mg BID and RA medications. Neurology had signed off as there was no further workup to be done. HSV PCR was negative, and acyclovir was discontinued. Patient was alert and orientated to person, place and time on day of admission. PT/OT evaluated patient and recommended skilled nursing facility. She is clinically and medically stable for discharge to  [facility]. Steroid taper as well as other home medications will be continued as indicated in plan of care note dated 12/31.

## 2018-12-27 NOTE — ASSESSMENT & PLAN NOTE
Initial presentation of blurry vision, consistent w/ HTN encephalopathy. Patient waxed and waned from baseline to more confusing during her stay in the observation unit. Was more somnolent on 12/18 and oral medication was stopped 2/2 aspiration risk and some difficulty w/ BP control w/ IV meds due to availability of medication as well as observation status. Had worsening mentation throughout the day and developed a fever ( Tmax 102.6), tachycardia, increased RR and persistently elevated BP.  She was transferred to MICU for increased level of care overnight on 12/19.    Patient stepped down to medicine overnight. Continues to improve, w/ less variation in her mentation throughout the day; A&O x4 this morning. HSV PCR negative, can stop the acyclovir at this time and continue supportive care for this patient, which includes maintaining an appropriate sleep-wake cycle. No need for further imaging or EEGs as patient continues to approach baseline.     - MRI- no intracranial acute pathology   - LP- negative HSV PCR - ok to d/c Acyclovir   - No need for further imaging or EEG studies at this time.   - Patient is immunocompromised 2/2 RA medication (steroids, DMARD); likely viral encephalitis picture, which continues to improve  - Will sign off at this time, appreciate consult  - Patient to follow up w/ Dr. Tobias outpatient

## 2018-12-27 NOTE — PLAN OF CARE
Problem: Adult Inpatient Plan of Care  Goal: Plan of Care Review  Outcome: Ongoing (interventions implemented as appropriate)  Pt is AAOx4 to self and situation. Pt was re orientated to time/date and place. Pt attempted to get out of bed x 1 this morning. PT placed pt in recliner which pt stayed up for several hours. Interacted with family. One bm. Pt able to get on bsc with 1 assist. Skin CDI. bandaids to lower back from previous LP were removed. IV acyclovir given. Tele in place, NSR noted. pts SBP has remained under goal range. Appetite fair. Fall precautions in place. Pt given heparin and SCDs in place. Pt started back on RA med and bactrim for oppurtunistic infection ppx.

## 2018-12-27 NOTE — NURSING TRANSFER
Nursing Transfer Note      12/26/2018     Transfer To: [id number]    Transfer via bed    Transfer with cardiac monitoring    Transported by RN    Medicines sent: Yes    Chart send with patient: Yes    Notified: family    Patient reassessed at: 12/26/18, 1930    Upon arrival to floor: cardiac monitor applied, patient oriented to room, call bell in reach and bed in lowest position

## 2018-12-27 NOTE — PLAN OF CARE
Problem: Physical Therapy Goal  Goal: Physical Therapy Goal  Goals to be met by: 1/4/2019    Patient will increase functional independence with mobility by performing:    Supine <> sit with Stand-by Assistance.  Sit <> stand transfer with Contact Guard Assistance using Rolling Walker.  Bed <> chair transfer via Stand Pivot with Contact Guard Assistance using Rolling Walker.  Gait  x 100 feet with Minimal Assistance using Rolling Walker to prepare for community ambulation and endurance activities.  Able to tolerate exercise for 15-20 reps with assistance as needed.       Outcome: Ongoing (interventions implemented as appropriate)    Pt would benefit from SNF upon discharge.  Appropriate transfer level with nursing staff: Bed <> Chair:  Stand Pivot with minimum assistance and of 1 persons with RW.    Radha Gillis PT, DPT  12/27/2018  Pager: [phone]

## 2018-12-27 NOTE — PLAN OF CARE
SW called Pt daughter Susu to follow up on skilled choices. SW was informed that she was previously in [facility] and pt did not like it as much and will look  In to other options. Daughter asked for a day to think about options. SW will call daughter back on Friday to obtain choices.

## 2018-12-27 NOTE — PROGRESS NOTES
Call received from tele re: new onset of short runs of V tach, 3-4 beats etc. Checked on patient who has no complaints. VSS. Called the team and orders received and entered. Awaiting labs and EKG.

## 2018-12-28 LAB
ANION GAP SERPL CALC-SCNC: 8 MMOL/L
ANISOCYTOSIS BLD QL SMEAR: SLIGHT
BACTERIA #/AREA URNS AUTO: ABNORMAL /HPF
BASOPHILS # BLD AUTO: 0.02 K/UL
BASOPHILS NFR BLD: 0.1 %
BILIRUB UR QL STRIP: NEGATIVE
BUN SERPL-MCNC: 36 MG/DL
BURR CELLS BLD QL SMEAR: ABNORMAL
C DIFF GDH STL QL: POSITIVE
C DIFF TOX A+B STL QL IA: NEGATIVE
C DIFF TOX GENS STL QL NAA+PROBE: NEGATIVE
CALCIUM SERPL-MCNC: 9 MG/DL
CHLORIDE SERPL-SCNC: 112 MMOL/L
CLARITY UR REFRACT.AUTO: ABNORMAL
CO2 SERPL-SCNC: 25 MMOL/L
COLOR UR AUTO: YELLOW
CREAT SERPL-MCNC: 1.4 MG/DL
DIFFERENTIAL METHOD: ABNORMAL
EEEV IGG TITR CSF IF: NORMAL {TITER}
EEEV IGM TITR CSF IF: NORMAL {TITER}
EOSINOPHIL # BLD AUTO: 0.1 K/UL
EOSINOPHIL NFR BLD: 1 %
ERYTHROCYTE [DISTWIDTH] IN BLOOD BY AUTOMATED COUNT: 15.2 %
EST. GFR  (AFRICAN AMERICAN): 40.6 ML/MIN/1.73 M^2
EST. GFR  (NON AFRICAN AMERICAN): 35.3 ML/MIN/1.73 M^2
GLUCOSE SERPL-MCNC: 172 MG/DL
GLUCOSE UR QL STRIP: ABNORMAL
HCT VFR BLD AUTO: 41.8 %
HGB BLD-MCNC: 12.5 G/DL
HGB UR QL STRIP: NEGATIVE
HYALINE CASTS UR QL AUTO: 0 /LPF
IMM GRANULOCYTES # BLD AUTO: 0.26 K/UL
IMM GRANULOCYTES NFR BLD AUTO: 1.9 %
KETONES UR QL STRIP: NEGATIVE
LACTATE SERPL-SCNC: 2.6 MMOL/L
LACV IGG CSF QL IF: NORMAL
LACV IGM TITR CSF IF: NORMAL {TITER}
LEUKOCYTE ESTERASE UR QL STRIP: ABNORMAL
LYMPHOCYTES # BLD AUTO: 2.3 K/UL
LYMPHOCYTES NFR BLD: 17.2 %
MAGNESIUM SERPL-MCNC: 1.7 MG/DL
MCH RBC QN AUTO: 27.2 PG
MCHC RBC AUTO-ENTMCNC: 29.9 G/DL
MCV RBC AUTO: 91 FL
MICROSCOPIC COMMENT: ABNORMAL
MONOCYTES # BLD AUTO: 2.2 K/UL
MONOCYTES NFR BLD: 16.7 %
NEUTROPHILS # BLD AUTO: 8.4 K/UL
NEUTROPHILS NFR BLD: 63.1 %
NITRITE UR QL STRIP: NEGATIVE
NON-SQ EPI CELLS #/AREA URNS AUTO: 4 /HPF
NRBC BLD-RTO: 0 /100 WBC
OVALOCYTES BLD QL SMEAR: ABNORMAL
PH UR STRIP: 5 [PH] (ref 5–8)
PHOSPHATE SERPL-MCNC: 2.7 MG/DL
PLATELET # BLD AUTO: 212 K/UL
PLATELET BLD QL SMEAR: ABNORMAL
PMV BLD AUTO: 10 FL
POIKILOCYTOSIS BLD QL SMEAR: SLIGHT
POLYCHROMASIA BLD QL SMEAR: ABNORMAL
POTASSIUM SERPL-SCNC: 3.6 MMOL/L
PROT UR QL STRIP: ABNORMAL
RBC # BLD AUTO: 4.6 M/UL
RBC #/AREA URNS AUTO: 8 /HPF (ref 0–4)
SLEV IGG TITR CSF IF: NORMAL {TITER}
SLEV IGM TITR CSF IF: NORMAL {TITER}
SODIUM SERPL-SCNC: 145 MMOL/L
SP GR UR STRIP: 1.02 (ref 1–1.03)
SPECIMEN SOURCE: NORMAL
SPECIMEN SOURCE: NORMAL
SQUAMOUS #/AREA URNS AUTO: 12 /HPF
URN SPEC COLLECT METH UR: ABNORMAL
VARICELLA ZOSTER BY PCR RESULT: NEGATIVE
WBC # BLD AUTO: 13.36 K/UL
WBC #/AREA URNS AUTO: >100 /HPF (ref 0–5)
WBC CLUMPS UR QL AUTO: ABNORMAL
WEEV IGG TITR CSF IF: NORMAL {TITER}
WEEV IGM TITR CSF IF: NORMAL {TITER}
WNV RNA CSF QL NAA+PROBE: NEGATIVE
YEAST UR QL AUTO: ABNORMAL

## 2018-12-28 PROCEDURE — 97530 THERAPEUTIC ACTIVITIES: CPT

## 2018-12-28 PROCEDURE — 85025 COMPLETE CBC W/AUTO DIFF WBC: CPT

## 2018-12-28 PROCEDURE — 97535 SELF CARE MNGMENT TRAINING: CPT

## 2018-12-28 PROCEDURE — 83735 ASSAY OF MAGNESIUM: CPT

## 2018-12-28 PROCEDURE — 80048 BASIC METABOLIC PNL TOTAL CA: CPT

## 2018-12-28 PROCEDURE — 63600175 PHARM REV CODE 636 W HCPCS: Performed by: STUDENT IN AN ORGANIZED HEALTH CARE EDUCATION/TRAINING PROGRAM

## 2018-12-28 PROCEDURE — 81001 URINALYSIS AUTO W/SCOPE: CPT

## 2018-12-28 PROCEDURE — 87086 URINE CULTURE/COLONY COUNT: CPT

## 2018-12-28 PROCEDURE — 11000001 HC ACUTE MED/SURG PRIVATE ROOM

## 2018-12-28 PROCEDURE — 99232 SBSQ HOSP IP/OBS MODERATE 35: CPT | Mod: GC,,, | Performed by: INTERNAL MEDICINE

## 2018-12-28 PROCEDURE — 87493 C DIFF AMPLIFIED PROBE: CPT

## 2018-12-28 PROCEDURE — 25000003 PHARM REV CODE 250: Performed by: STUDENT IN AN ORGANIZED HEALTH CARE EDUCATION/TRAINING PROGRAM

## 2018-12-28 PROCEDURE — 92526 ORAL FUNCTION THERAPY: CPT

## 2018-12-28 PROCEDURE — 87449 NOS EACH ORGANISM AG IA: CPT

## 2018-12-28 PROCEDURE — 36415 COLL VENOUS BLD VENIPUNCTURE: CPT

## 2018-12-28 PROCEDURE — 99232 PR SUBSEQUENT HOSPITAL CARE,LEVL II: ICD-10-PCS | Mod: GC,,, | Performed by: INTERNAL MEDICINE

## 2018-12-28 PROCEDURE — 83605 ASSAY OF LACTIC ACID: CPT

## 2018-12-28 PROCEDURE — 84100 ASSAY OF PHOSPHORUS: CPT

## 2018-12-28 RX ORDER — FUROSEMIDE 20 MG/1
20 TABLET ORAL DAILY
Status: DISCONTINUED | OUTPATIENT
Start: 2018-12-28 | End: 2018-12-31 | Stop reason: HOSPADM

## 2018-12-28 RX ORDER — ACETAMINOPHEN 325 MG/1
650 TABLET ORAL EVERY 6 HOURS PRN
Status: DISCONTINUED | OUTPATIENT
Start: 2018-12-28 | End: 2018-12-31 | Stop reason: HOSPADM

## 2018-12-28 RX ORDER — RAMELTEON 8 MG/1
8 TABLET ORAL NIGHTLY PRN
Status: DISCONTINUED | OUTPATIENT
Start: 2018-12-28 | End: 2018-12-31 | Stop reason: HOSPADM

## 2018-12-28 RX ADMIN — FUROSEMIDE 20 MG: 20 TABLET ORAL at 01:12

## 2018-12-28 RX ADMIN — PREDNISONE 25 MG: 5 TABLET ORAL at 08:12

## 2018-12-28 RX ADMIN — RAMELTEON 8 MG: 8 TABLET, FILM COATED ORAL at 08:12

## 2018-12-28 RX ADMIN — HYDROXYCHLOROQUINE SULFATE 400 MG: 200 TABLET, FILM COATED ORAL at 08:12

## 2018-12-28 RX ADMIN — ACETAMINOPHEN 650 MG: 325 TABLET ORAL at 06:12

## 2018-12-28 RX ADMIN — HEPARIN SODIUM 5000 UNITS: 5000 INJECTION, SOLUTION INTRAVENOUS; SUBCUTANEOUS at 10:12

## 2018-12-28 RX ADMIN — HEPARIN SODIUM 5000 UNITS: 5000 INJECTION, SOLUTION INTRAVENOUS; SUBCUTANEOUS at 01:12

## 2018-12-28 RX ADMIN — CARVEDILOL 12.5 MG: 12.5 TABLET, FILM COATED ORAL at 08:12

## 2018-12-28 RX ADMIN — CLOPIDOGREL 75 MG: 75 TABLET, FILM COATED ORAL at 08:12

## 2018-12-28 RX ADMIN — CARVEDILOL 12.5 MG: 12.5 TABLET, FILM COATED ORAL at 05:12

## 2018-12-28 RX ADMIN — ASPIRIN 81 MG CHEWABLE TABLET 81 MG: 81 TABLET CHEWABLE at 08:12

## 2018-12-28 RX ADMIN — POTASSIUM & SODIUM PHOSPHATES POWDER PACK 280-160-250 MG 2 PACKET: 280-160-250 PACK at 11:12

## 2018-12-28 RX ADMIN — POTASSIUM & SODIUM PHOSPHATES POWDER PACK 280-160-250 MG 2 PACKET: 280-160-250 PACK at 06:12

## 2018-12-28 RX ADMIN — HEPARIN SODIUM 5000 UNITS: 5000 INJECTION, SOLUTION INTRAVENOUS; SUBCUTANEOUS at 08:12

## 2018-12-28 RX ADMIN — HEPARIN SODIUM 5000 UNITS: 5000 INJECTION, SOLUTION INTRAVENOUS; SUBCUTANEOUS at 06:12

## 2018-12-28 NOTE — PROGRESS NOTES
Pt has had 4 large stools with the last 3 being diarrhea. Pt daughter stated the only thing new was the PHOS-NaK supplements. MD notified and verbally stated to hold supplements and senna. CDiff order will be administered.

## 2018-12-28 NOTE — PROGRESS NOTES
[facility] Hospital Medicine  Progress Note    Patient Name: Selma Alonzo Lux MD  MRN: [id number]  Patient Class: IP- Inpatient   Admission Date: 12/16/2018  Length of Stay: 9 days  Attending Physician: Isis Singh MD  Primary Care Provider: Bhargav Hirsch MD    [facility] Medicine Team: [facility] HOSP MED 3 Kiran Perez MD    Subjective:     Principal Problem:Acute encephalopathy    HPI:  Ms. Hahn Alonzo Lux MD is a 83 y.o. female with rheumatoid arthritis on steroids, Plaquenil and Sulfasalazine, history of stroke on Aspirin/Plavix, and vascular dementia who presents to the emergency department with her daughter because of confusion.  History is obtained from the daughter, as the patient is not able to provide a good history.  The daughter mentions that at baseline, the patient is very independent and is very sharp.  However, the day prior to admission the patient was endorsing some blurred vision, having difficulty eating saying that she could not see her plate, and was having unusual behavior-like sitting on her bedroom floor, claiming that someone moved her bed causing her to fall.  She has been having tangential speech requiring frequent redirection.  Additionally, she was complaining of some right-sided leg pain, which is unusual, as she normally complaints of left-sided leg pain.  The patient denies any numbness or tingling in any of her extremities.  The patient was just started sulfasalazine yesterday, to be additional therapy for her rheumatoid arthritis.  Of note, the patient ran out of her prednisone 2 days prior to admission.  She has been taking 25 mg daily, and took her dose this morning.     The in the emergency department, she was found to have a blood pressure of 220 on arrival.  Daughter reports that her baseline blood pressure runs low, 110-130.  She received 2 doses of Labetalol 20mg IV x 2 with improvement in her blood pressure to the 180s.  Head CT and MRI were  negative for any acute process.  Labs were notable for a trop 0.025.  She was admitted to Hospital Medicine for further management.           Hospital Course:  Hospital Course  Admitted to ICU with Encephalopathy with delirium etiology of encephalopathy unclear but likely viral encephalitis.s/p IR guided LP (12/24/18). Cell count in CSF not consistent with bacterial meningitis, continue on acyclovir but d/c vanc and cefipime, remains afebrile and WBC stable.  repeated EEG x2 with no signs of seizure activity , MRI not consistent with PRESS, or meningeal enhancement . Acute on chronic renal failure, cr improving. Immune compromised due to chronic Plaquenil (on hold) and  Steroid use for Rheumatoid arthritis      LP 12/24/18 12/27: stepped down to floor, continue acyclovir, resume coreg and HCQ, continue to hold sulfasalazine  12/28: DC bactrim as pt was finished w it prior to this admission, resume home lasix 40mg BID today      Interval History: no acute issues , pt still disoriented to places     Review of Systems  Objective:     Vital Signs (Most Recent):  Temp: 97.9 °F (36.6 °C) (12/28/18 0846)  Pulse: 74 (12/28/18 1201)  Resp: 20 (12/28/18 0846)  BP: (!) 173/100 (12/28/18 0846)  SpO2: 97 % (12/28/18 0846) Vital Signs (24h Range):  Temp:  [97.8 °F (36.6 °C)-98.1 °F (36.7 °C)] 97.9 °F (36.6 °C)  Pulse:  [] 74  Resp:  [17-20] 20  SpO2:  [97 %-98 %] 97 %  BP: (144-173)/() 173/100     Weight: 72.4 kg (159 lb 9.8 oz)  Body mass index is 28.27 kg/m².    Intake/Output Summary (Last 24 hours) at 12/28/2018 1323  Last data filed at 12/28/2018 0600  Gross per 24 hour   Intake 280 ml   Output 1200 ml   Net -920 ml      Physical Exam   HENT:   Head: Normocephalic and atraumatic.   Mouth/Throat: Oropharynx is clear and moist. No oropharyngeal exudate.   Eyes: EOM are normal. Pupils are equal, round, and reactive to light. Right eye exhibits no discharge. Left eye exhibits no discharge. No scleral icterus.    Neck: Normal range of motion. Neck supple. No JVD present. No tracheal deviation present.   Cardiovascular: Normal rate, regular rhythm and normal heart sounds. Exam reveals no gallop and no friction rub.   No murmur heard.  Pulmonary/Chest: Effort normal and breath sounds normal. No stridor. No respiratory distress. She has no wheezes.   Abdominal: Soft. She exhibits distension. She exhibits no mass. There is no tenderness. There is no guarding.   Musculoskeletal: Normal range of motion. She exhibits no edema or deformity.   Lymphadenopathy:     She has no cervical adenopathy.   Neurological: She displays normal reflexes. She exhibits abnormal muscle tone. Coordination normal.   Oriented to time and person, not place, Follows commands, very mild weakness on the right side from prior stroke   Skin: Skin is warm and dry. Capillary refill takes less than 2 seconds.       Significant Labs:   Recent Lab Results       12/28/18  1026        Immature Granulocytes 1.9     Immature Grans (Abs) 0.26  Comment:  Mild elevation in immature granulocytes is non specific and   can be seen in a variety of conditions including stress response,   acute inflammation, trauma and pregnancy. Correlation with other   laboratory and clinical findings is essential.       Anion Gap 8     Aniso Slight     Baso # 0.02     Basophil% 0.1     BUN, Bld 36     [place] Cells Occasional     Calcium 9.0     Chloride 112     CO2 25     Creatinine 1.4     Differential Method Automated     eGFR if African American 40.6     eGFR if non  35.3  Comment:  Calculation used to obtain the estimated glomerular filtration  rate (eGFR) is the CKD-EPI equation.        Eos # 0.1     Eosinophil% 1.0     Glucose 172     Gran # (ANC) 8.4     Gran% 63.1     Hematocrit 41.8     Hemoglobin 12.5     Lactate, Sim 2.6  Comment:  Falsely low lactic acid results can be found in samples   containing >=13.0 mg/dL total bilirubin and/or >=3.5 mg/dL   direct  bilirubin.       Lymph # 2.3     Lymph% 17.2     Magnesium 1.7     MCH 27.2     MCHC 29.9     MCV 91     Mono # 2.2     Mono% 16.7     MPV 10.0     nRBC 0     Ovalocytes Occasional     Phosphorus 2.7     Platelet Estimate Appears normal     Platelets 212     Poik Slight     Poly Occasional     Potassium 3.6     RBC 4.60     RDW 15.2     Sodium 145     WBC 13.36         All pertinent labs within the past 24 hours have been reviewed.    Significant Imaging: I have reviewed all pertinent imaging results/findings within the past 24 hours.    Assessment/Plan:      * Acute encephalopathy    84 y/o F with RA (chronically on steroids, plaquenil and sulfasalazine), history of multiple TIA's (on aspirin and plavix),  And vascular dementia presents to the ED on 12/17/18 with a change in her mentation status with blurry vision (baseline able to perform all ADLS exceptionally) as well as right sided leg pain. The patient denies any numbness or tingling in any of her extremities. Of note, the patient ran out of her prednisone 2 days prior to admission. She has been taking 25 mg daily. On arrival to the ED patient had SBP in the 220 (BL -130). She received 2 doses of Labetalol 20mg IV x 2 with improvement in her blood pressure to the 180s.  Head CT and MRI in the ED were negative for any acute process. EEG 12/19 showed toxic medical encephalopathy without epileptic waves. Repeat MRI does not show any intracranial processes.     Plan  1) IR to obtain LP as we had three failed attempts on the floor. On 12/21 patient did not obtain LP on patient as she lost peripheral access when she went downstairs. Patient mental status is waxing and waning. S/p LP 12/24. 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  stopped Cefepime 12/26/18  2) Encephalopathy initially thought to be hypertensive as patient had blurry vision as well as hypertension. CT and MRI negative for stroke. BP control, pt unable to take orally. On IV hydralazine prn for   SBP>180. Reviewed images with radiology again and images do not indicate PRES or temporal lobe enhancement.   3)stopped vancomycin 12/25 (CSF unrevealing so far) continue cefepime, and acyclovir. Anticipation to stop Cefepime tomorrow 12/26   4) stopped trazodone 12/25   5) EEG twice were negative for seizure activity.   6) Neurology following up and at this time, we are treating for viral encephalitis    HSV neg,VZV and VDRL pending.     DC acyclovir   EEG not needed   Patient is immunocompromised 2/2 RA medication (steroids, DMARD); likely viral encephalitis picture, which continues to improve        Acute renal failure superimposed on stage 3 chronic kidney disease    -- baseline Creatine 1.1.12/20 FC was inserted due to retention.   -- Cr is improving today   -- Urine lytes ordered- urine sodium <20, creatinine 174  -- strict in and out. Please  Match I/O  -- avoid nephrotoxic medication. Properly renally dosed antibiotics.     Long-term use of immunosuppressant medication    · Follows with Dr. Rouse of Rheumatology    S/p 8 days of iv solumedrol 20mg daily while in the ICU  · On Prednisone 25mg PO daily , need tapering to baseline 5mg every 3 weeks  · Hold Sulfasalazine 1g PO BID while still pending workup results for viral encephalitis  · Resume Plaquenil 400mg PO daily    DC Bactrim       Seropositive rheumatoid arthritis of multiple sites           Hypertension, essential    · Head CT and MRI negative for acute process. Repeat MRI negative which shows no acute intracranial abnormality.   · Takes Coreg 12.5mg PO BID at home.       Resume home coreg   Resume lasix 20mg po BID today     Vascular dementia           Poor nutrition    Boost  Speech eval: Dental Soft, Liquid Diet Level: Thin            Altered mental status    See above  Delirium precautious  [place] for sleep       Delirium superimposed on dementia    Delirium precautions       Dystonia    · Follows with Dr. Giang of Neurology  · Hold  Gabapentin and Baclofen  for now given AMS            VTE Risk Mitigation (From admission, onward)        Ordered     heparin (porcine) injection 5,000 Units  Every 8 hours      12/25/18 1045     Place sequential compression device  Until discontinued      12/21/18 1024     IP VTE HIGH RISK PATIENT  Once      12/16/18 1933              Kiran Perez MD  Department of Hospital Medicine   [facility]

## 2018-12-28 NOTE — PLAN OF CARE
Problem: Occupational Therapy Goal  Goal: Occupational Therapy Goal  Goals to be met by: 1/15     Patient will increase functional independence with ADLs by performing:    UE Dressing with [place].  LE Dressing with Contact Guard Assistance.  Grooming while seated with [place] and Set-up Assistance.  Toileting from toilet with Minimal Assistance for hygiene and clothing management.     Outcome: Ongoing (interventions implemented as appropriate)  Evaluation complete and goals set.  Cont with POC  Beryl Matos OT  12/28/2018

## 2018-12-28 NOTE — PLAN OF CARE
"EBONIE Susu (daughter) - [phone] to follow up on skilled choices. Daughter indicated she has not has the chance to think about skilled choices and her  Mom health needs have changed. EBONIE offered to provide a list for choices but indicated " I want your recommendations on memory care units". EBONIE informed her of not having any recommendations for memory care units and advised her to review a list and contract facilities in the area. Daughter will explore options and asked for EBONIE to e-mail her so that she can make contact via e-mail for further choices.   "

## 2018-12-28 NOTE — PT/OT/SLP PROGRESS
Occupational Therapy   Treatment    Name: Selma Lux MD  MRN: [id number]  Admitting Diagnosis:  Acute encephalopathy       Recommendations:     Discharge Recommendations: nursing facility, skilled  Discharge Equipment Recommendations:  none  Barriers to discharge:  Decreased caregiver support    Subjective     Pain/Comfort:  Pain Rating 1: 0/10    Objective:     Communicated with: RN prior to session.  Patient found with daughter present and telemetry, peripheral IV upon OT entry to room.    General Precautions: Standard, fall   Orthopedic Precautions:N/A   Braces: N/A     Occupational Performance:    Bed Mobility:    · Pt with supine to sit edge of bed with encouragement and Min A     Functional Mobility/Transfers:  · Patient completed Sit <> Stand Transfer with contact guard assistance  with  no assistive device   · Patient completed Bed <> Chair Transfer using Step Transfer technique with minimum assistance with no assistive device  · Patient completed Toilet Transfer Stand Pivot technique with contact guard assistance with  no AD and to BSC at bedside   · Functional Mobility: Pt able to stand for toileting hygiene and positioning with reminders for upright posture      Activities of Daily Living:  · Lower Body Dressing: moderate assistance    · Toileting: moderate assistance on BSC       AMPA 6 Click ADL: 15    Treatment & Education:  Pt educated on safety, role of OT, importance of increased participation in self care for gains , expectations for participation, expectations for gains, POC, energy conservation, caregiver strain. White board updated.   - bed mobility with verbal cues and encouragement for participation and min A to sit edge of bed  - pt with BSC transfer with min A and mod A for hygiene after bowel movement   - BSC to chair transfer with stand pivot with min A   - extensive time in room for completion on self care and pt with increased performance and desire for ADL particpation  Patient  left up in chair with all lines intact  Education:    Assessment:     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  She presents with the following performance deficits affecting function are weakness, impaired endurance, impaired self care skills, impaired functional mobilty, impaired cognition, gait instability, impaired balance.     Rehab Prognosis:  Good; patient would benefit from acute skilled OT services to address these deficits and reach maximum level of function.       Plan:     Patient to be seen 3 x/week to address the above listed problems via self-care/home management, therapeutic activities, therapeutic exercises  · Plan of Care Expires: 01/21/19  · Plan of Care Reviewed with: patient    This Plan of care has been discussed with the patient who was involved in its development and understands and is in agreement with the identified goals and treatment plan    GOALS:   Multidisciplinary Problems     Occupational Therapy Goals        Problem: Occupational Therapy Goal    Goal Priority Disciplines Outcome Interventions   Occupational Therapy Goal     OT, PT/OT Ongoing (interventions implemented as appropriate)    Description:  Goals to be met by: 1/8/2018    Pt will tolerate sitting at EOB x8 minutes with min A in prep for seated grooming.  Pt will complete seated grooming with min A.  Pt will complete supine with HOB flat to seated at EOB with mod A in prep for seated grooming.           Problem: Occupational Therapy Goal    Goal Priority Disciplines Outcome Interventions   Occupational Therapy Goal     OT, PT/OT Ongoing (interventions implemented as appropriate)    Description:  Goals to be met by: 1/15     Patient will increase functional independence with ADLs by performing:    UE Dressing with [place].  LE Dressing with Contact Guard Assistance.  Grooming while seated with [place] and Set-up Assistance.  Toileting from toilet with Minimal  Assistance for hygiene and clothing management.                       Time Tracking:     OT Date of Treatment: 12/28/18  OT Start Time: 0903  OT Stop Time: 0956  OT Total Time (min): 53 min    Billable Minutes:Self Care/Home Management 25  Therapeutic Activity 28    Beryl Matos, OT  12/28/2018

## 2018-12-28 NOTE — ASSESSMENT & PLAN NOTE
· Follows with Dr. Rouse of Rheumatology    S/p 8 days of iv solumedrol 20mg daily while in the ICU  · On Prednisone 25mg PO daily , need tapering to baseline 5mg every 3 weeks  · Hold Sulfasalazine 1g PO BID while still pending workup results for viral encephalitis  · Resume Plaquenil 400mg PO daily    DC Bactrim

## 2018-12-28 NOTE — SUBJECTIVE & OBJECTIVE
Interval History: no acute issues , pt still disoriented to places     Review of Systems  Objective:     Vital Signs (Most Recent):  Temp: 97.9 °F (36.6 °C) (12/28/18 0846)  Pulse: 74 (12/28/18 1201)  Resp: 20 (12/28/18 0846)  BP: (!) 173/100 (12/28/18 0846)  SpO2: 97 % (12/28/18 0846) Vital Signs (24h Range):  Temp:  [97.8 °F (36.6 °C)-98.1 °F (36.7 °C)] 97.9 °F (36.6 °C)  Pulse:  [] 74  Resp:  [17-20] 20  SpO2:  [97 %-98 %] 97 %  BP: (144-173)/() 173/100     Weight: 72.4 kg (159 lb 9.8 oz)  Body mass index is 28.27 kg/m².    Intake/Output Summary (Last 24 hours) at 12/28/2018 1323  Last data filed at 12/28/2018 0600  Gross per 24 hour   Intake 280 ml   Output 1200 ml   Net -920 ml      Physical Exam   HENT:   Head: Normocephalic and atraumatic.   Mouth/Throat: Oropharynx is clear and moist. No oropharyngeal exudate.   Eyes: EOM are normal. Pupils are equal, round, and reactive to light. Right eye exhibits no discharge. Left eye exhibits no discharge. No scleral icterus.   Neck: Normal range of motion. Neck supple. No JVD present. No tracheal deviation present.   Cardiovascular: Normal rate, regular rhythm and normal heart sounds. Exam reveals no gallop and no friction rub.   No murmur heard.  Pulmonary/Chest: Effort normal and breath sounds normal. No stridor. No respiratory distress. She has no wheezes.   Abdominal: Soft. She exhibits distension. She exhibits no mass. There is no tenderness. There is no guarding.   Musculoskeletal: Normal range of motion. She exhibits no edema or deformity.   Lymphadenopathy:     She has no cervical adenopathy.   Neurological: She displays normal reflexes. She exhibits abnormal muscle tone. Coordination normal.   Oriented to time and person, not place, Follows commands, very mild weakness on the right side from prior stroke   Skin: Skin is warm and dry. Capillary refill takes less than 2 seconds.       Significant Labs:   Recent Lab Results       12/28/18  102         Immature Granulocytes 1.9     Immature Grans (Abs) 0.26  Comment:  Mild elevation in immature granulocytes is non specific and   can be seen in a variety of conditions including stress response,   acute inflammation, trauma and pregnancy. Correlation with other   laboratory and clinical findings is essential.       Anion Gap 8     Aniso Slight     Baso # 0.02     Basophil% 0.1     BUN, Bld 36     [place] Cells Occasional     Calcium 9.0     Chloride 112     CO2 25     Creatinine 1.4     Differential Method Automated     eGFR if African American 40.6     eGFR if non  35.3  Comment:  Calculation used to obtain the estimated glomerular filtration  rate (eGFR) is the CKD-EPI equation.        Eos # 0.1     Eosinophil% 1.0     Glucose 172     Gran # (ANC) 8.4     Gran% 63.1     Hematocrit 41.8     Hemoglobin 12.5     Lactate, Sim 2.6  Comment:  Falsely low lactic acid results can be found in samples   containing >=13.0 mg/dL total bilirubin and/or >=3.5 mg/dL   direct bilirubin.       Lymph # 2.3     Lymph% 17.2     Magnesium 1.7     MCH 27.2     MCHC 29.9     MCV 91     Mono # 2.2     Mono% 16.7     MPV 10.0     nRBC 0     Ovalocytes Occasional     Phosphorus 2.7     Platelet Estimate Appears normal     Platelets 212     Poik Slight     Poly Occasional     Potassium 3.6     RBC 4.60     RDW 15.2     Sodium 145     WBC 13.36         All pertinent labs within the past 24 hours have been reviewed.    Significant Imaging: I have reviewed all pertinent imaging results/findings within the past 24 hours.

## 2018-12-28 NOTE — ASSESSMENT & PLAN NOTE
84 y/o F with RA (chronically on steroids, plaquenil and sulfasalazine), history of multiple TIA's (on aspirin and plavix),  And vascular dementia presents to the ED on 12/17/18 with a change in her mentation status with blurry vision (baseline able to perform all ADLS exceptionally) as well as right sided leg pain. The patient denies any numbness or tingling in any of her extremities. Of note, the patient ran out of her prednisone 2 days prior to admission. She has been taking 25 mg daily. On arrival to the ED patient had SBP in the 220 (BL -130). She received 2 doses of Labetalol 20mg IV x 2 with improvement in her blood pressure to the 180s.  Head CT and MRI in the ED were negative for any acute process. EEG 12/19 showed toxic medical encephalopathy without epileptic waves. Repeat MRI does not show any intracranial processes.     Plan  1) IR to obtain LP as we had three failed attempts on the floor. On 12/21 patient did not obtain LP on patient as she lost peripheral access when she went downstairs. Patient mental status is waxing and waning. S/p LP 12/24. 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  stopped Cefepime 12/26/18  2) Encephalopathy initially thought to be hypertensive as patient had blurry vision as well as hypertension. CT and MRI negative for stroke. BP control, pt unable to take orally. On IV hydralazine prn for  SBP>180. Reviewed images with radiology again and images do not indicate PRES or temporal lobe enhancement.   3)stopped vancomycin 12/25 (CSF unrevealing so far) continue cefepime, and acyclovir. Anticipation to stop Cefepime tomorrow 12/26   4) stopped trazodone 12/25   5) EEG twice were negative for seizure activity.   6) Neurology following up and at this time, we are treating for viral encephalitis    HSV neg,VZV and VDRL pending.    DC acyclovir   EEG not needed   Patient is immunocompromised 2/2 RA medication (steroids, DMARD); likely viral encephalitis picture, which continues to  improve

## 2018-12-28 NOTE — PLAN OF CARE
Problem: Fall Injury Risk  Goal: Absence of Fall and Fall-Related Injury    Intervention: Promote Injury-Free Environment  Keep care area uncluttered and needed items within reach.

## 2018-12-28 NOTE — ASSESSMENT & PLAN NOTE
· Head CT and MRI negative for acute process. Repeat MRI negative which shows no acute intracranial abnormality.   · Takes Coreg 12.5mg PO BID at home.       Resume home coreg   Resume lasix 20mg po BID today

## 2018-12-28 NOTE — PT/OT/SLP PROGRESS
Speech Language Pathology Treatment and Discharge Summary    Patient Name:  Selma Lux MD   MRN:  [id number]  Admitting Diagnosis: Acute encephalopathy    Recommendations:                 General Recommendations:  Follow-up not indicated  Diet recommendations:  Dental Soft, Liquid Diet Level: Thin   Aspiration Precautions: Standard aspiration precautions   General Precautions: Standard, fall  Communication strategies:  none    Subjective     Pt awake;alert.     Pain/Comfort:  · Pain Rating 1: 0/10    Objective:     Has the patient been evaluated by SLP for swallowing?   Yes  Keep patient NPO? No   Current Respiratory Status: room air      Patient tolerated thin liquids via straw sips (6oz total) along with dental soft trial (ham sandwich) x1 with no overt signs of airway compromise. Minimally prolonged mastication of dental soft trial, though patient achieved full oral cavity clearance. Dental soft consistencies recommended as patient's baseline diet.  Patient appropriate for discharge from acute ST at this time. Skilled education was provided to patient regarding diet recs, aspiration precautions, and discharge status from acute .     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 .  She presents with good tolerance of dental soft consistencies and thin liquids.     Goals:   Multidisciplinary Problems     SLP Goals        Problem: SLP Goal    Goal Priority Disciplines Outcome   SLP Goal     SLP Ongoing (interventions implemented as appropriate)   Description:  Speech Language Pathology Goals  Goals expected to be met by 12/28  1. Pt will tolerate a dental soft diet and thin liquids with no overt signs of airway compromise.                 Problem: SLP Goal    Goal Priority Disciplines Outcome   SLP Goal     SLP                    Plan:     · Patient to be seen:  4 x/week   · Plan of Care expires:  01/21/19  · Plan  of Care reviewed with:  patient   · SLP Follow-Up:  No       Discharge recommendations:  nursing facility, skilled   Barriers to Discharge:  None    Time Tracking:     SLP Treatment Date:   12/28/18  Speech Start Time:  1100  Speech Stop Time:  1110     Speech Total Time (min):  10 min    Billable Minutes: Treatment Swallowing Dysfunction 10    Emily Abadie, CCC-SLP  12/28/2018

## 2018-12-29 PROBLEM — N39.0 UTI (URINARY TRACT INFECTION): Status: ACTIVE | Noted: 2018-12-29

## 2018-12-29 LAB
ANION GAP SERPL CALC-SCNC: 8 MMOL/L
BACTERIA CSF CULT: NO GROWTH
BACTERIA UR CULT: NORMAL
BACTERIA UR CULT: NORMAL
BASOPHILS # BLD AUTO: 0.04 K/UL
BASOPHILS NFR BLD: 0.3 %
BUN SERPL-MCNC: 38 MG/DL
CALCIUM SERPL-MCNC: 9 MG/DL
CHLORIDE SERPL-SCNC: 111 MMOL/L
CO2 SERPL-SCNC: 24 MMOL/L
CREAT SERPL-MCNC: 1.3 MG/DL
DIFFERENTIAL METHOD: ABNORMAL
EOSINOPHIL # BLD AUTO: 0.1 K/UL
EOSINOPHIL NFR BLD: 1 %
ERYTHROCYTE [DISTWIDTH] IN BLOOD BY AUTOMATED COUNT: 15.4 %
EST. GFR  (AFRICAN AMERICAN): 44.5 ML/MIN/1.73 M^2
EST. GFR  (NON AFRICAN AMERICAN): 38.6 ML/MIN/1.73 M^2
GLUCOSE SERPL-MCNC: 97 MG/DL
GRAM STN SPEC: NORMAL
GRAM STN SPEC: NORMAL
HCT VFR BLD AUTO: 36.8 %
HGB BLD-MCNC: 11 G/DL
IMM GRANULOCYTES # BLD AUTO: 0.43 K/UL
IMM GRANULOCYTES NFR BLD AUTO: 3 %
LYMPHOCYTES # BLD AUTO: 3 K/UL
LYMPHOCYTES NFR BLD: 21.2 %
MAGNESIUM SERPL-MCNC: 1.6 MG/DL
MCH RBC QN AUTO: 26.9 PG
MCHC RBC AUTO-ENTMCNC: 29.9 G/DL
MCV RBC AUTO: 90 FL
MONOCYTES # BLD AUTO: 2.2 K/UL
MONOCYTES NFR BLD: 15.5 %
NEUTROPHILS # BLD AUTO: 8.4 K/UL
NEUTROPHILS NFR BLD: 59 %
NRBC BLD-RTO: 0 /100 WBC
PHOSPHATE SERPL-MCNC: 3.3 MG/DL
PLATELET # BLD AUTO: 218 K/UL
PMV BLD AUTO: 10.3 FL
POTASSIUM SERPL-SCNC: 3.6 MMOL/L
RBC # BLD AUTO: 4.09 M/UL
SODIUM SERPL-SCNC: 143 MMOL/L
WBC # BLD AUTO: 14.2 K/UL

## 2018-12-29 PROCEDURE — 25000003 PHARM REV CODE 250: Performed by: STUDENT IN AN ORGANIZED HEALTH CARE EDUCATION/TRAINING PROGRAM

## 2018-12-29 PROCEDURE — 36415 COLL VENOUS BLD VENIPUNCTURE: CPT

## 2018-12-29 PROCEDURE — 11000001 HC ACUTE MED/SURG PRIVATE ROOM

## 2018-12-29 PROCEDURE — 63600175 PHARM REV CODE 636 W HCPCS: Performed by: STUDENT IN AN ORGANIZED HEALTH CARE EDUCATION/TRAINING PROGRAM

## 2018-12-29 PROCEDURE — 83735 ASSAY OF MAGNESIUM: CPT

## 2018-12-29 PROCEDURE — 85025 COMPLETE CBC W/AUTO DIFF WBC: CPT

## 2018-12-29 PROCEDURE — 84100 ASSAY OF PHOSPHORUS: CPT

## 2018-12-29 PROCEDURE — 80048 BASIC METABOLIC PNL TOTAL CA: CPT

## 2018-12-29 PROCEDURE — 99232 PR SUBSEQUENT HOSPITAL CARE,LEVL II: ICD-10-PCS | Mod: GC,,, | Performed by: INTERNAL MEDICINE

## 2018-12-29 PROCEDURE — 99232 SBSQ HOSP IP/OBS MODERATE 35: CPT | Mod: GC,,, | Performed by: INTERNAL MEDICINE

## 2018-12-29 RX ORDER — CEFTRIAXONE 1 G/1
1 INJECTION, POWDER, FOR SOLUTION INTRAMUSCULAR; INTRAVENOUS
Status: DISCONTINUED | OUTPATIENT
Start: 2018-12-29 | End: 2018-12-29

## 2018-12-29 RX ORDER — CEFTRIAXONE 1 G/1
1 INJECTION, POWDER, FOR SOLUTION INTRAMUSCULAR; INTRAVENOUS
Status: DISCONTINUED | OUTPATIENT
Start: 2018-12-30 | End: 2018-12-30

## 2018-12-29 RX ORDER — PREDNISONE 20 MG/1
20 TABLET ORAL DAILY
Status: DISCONTINUED | OUTPATIENT
Start: 2018-12-30 | End: 2018-12-31 | Stop reason: HOSPADM

## 2018-12-29 RX ADMIN — POTASSIUM & SODIUM PHOSPHATES POWDER PACK 280-160-250 MG 2 PACKET: 280-160-250 PACK at 12:12

## 2018-12-29 RX ADMIN — HEPARIN SODIUM 5000 UNITS: 5000 INJECTION, SOLUTION INTRAVENOUS; SUBCUTANEOUS at 02:12

## 2018-12-29 RX ADMIN — CARVEDILOL 12.5 MG: 12.5 TABLET, FILM COATED ORAL at 05:12

## 2018-12-29 RX ADMIN — HEPARIN SODIUM 5000 UNITS: 5000 INJECTION, SOLUTION INTRAVENOUS; SUBCUTANEOUS at 09:12

## 2018-12-29 RX ADMIN — HEPARIN SODIUM 5000 UNITS: 5000 INJECTION, SOLUTION INTRAVENOUS; SUBCUTANEOUS at 05:12

## 2018-12-29 RX ADMIN — ACETAMINOPHEN 650 MG: 325 TABLET ORAL at 06:12

## 2018-12-29 RX ADMIN — POTASSIUM & SODIUM PHOSPHATES POWDER PACK 280-160-250 MG 2 PACKET: 280-160-250 PACK at 09:12

## 2018-12-29 RX ADMIN — ASPIRIN 81 MG CHEWABLE TABLET 81 MG: 81 TABLET CHEWABLE at 09:12

## 2018-12-29 RX ADMIN — CEFTRIAXONE SODIUM 1 G: 1 INJECTION, POWDER, FOR SOLUTION INTRAMUSCULAR; INTRAVENOUS at 09:12

## 2018-12-29 RX ADMIN — FUROSEMIDE 20 MG: 20 TABLET ORAL at 09:12

## 2018-12-29 RX ADMIN — CARVEDILOL 12.5 MG: 12.5 TABLET, FILM COATED ORAL at 09:12

## 2018-12-29 RX ADMIN — POTASSIUM & SODIUM PHOSPHATES POWDER PACK 280-160-250 MG 2 PACKET: 280-160-250 PACK at 05:12

## 2018-12-29 RX ADMIN — CLOPIDOGREL 75 MG: 75 TABLET, FILM COATED ORAL at 09:12

## 2018-12-29 RX ADMIN — PREDNISONE 25 MG: 5 TABLET ORAL at 09:12

## 2018-12-29 RX ADMIN — HYDROXYCHLOROQUINE SULFATE 400 MG: 200 TABLET, FILM COATED ORAL at 09:12

## 2018-12-29 NOTE — PLAN OF CARE
Problem: Adult Inpatient Plan of Care  Goal: Plan of Care Review  Outcome: Ongoing (interventions implemented as appropriate)   12/29/18 2572   Plan of Care Review   Plan of Care Reviewed With patient;sibling

## 2018-12-29 NOTE — PROGRESS NOTES
[facility] Hospital Medicine  Progress Note    Patient Name: Selma Alonzo Lux MD  MRN: [id number]  Patient Class: IP- Inpatient   Admission Date: 12/16/2018  Length of Stay: 10 days  Attending Physician: Isis Singh MD  Primary Care Provider: Bhargav Hirsch MD    [facility] Medicine Team: [facility] MED 3 Kiran Perez MD    Subjective:     Principal Problem:Acute encephalopathy    HPI:  Ms. Hahn Alonzo Lux MD is a 83 y.o. female with rheumatoid arthritis on steroids, Plaquenil and Sulfasalazine, history of stroke on Aspirin/Plavix, and vascular dementia who presents to the emergency department with her daughter because of confusion.  History is obtained from the daughter, as the patient is not able to provide a good history.  The daughter mentions that at baseline, the patient is very independent and is very sharp.  However, the day prior to admission the patient was endorsing some blurred vision, having difficulty eating saying that she could not see her plate, and was having unusual behavior-like sitting on her bedroom floor, claiming that someone moved her bed causing her to fall.  She has been having tangential speech requiring frequent redirection.  Additionally, she was complaining of some right-sided leg pain, which is unusual, as she normally complaints of left-sided leg pain.  The patient denies any numbness or tingling in any of her extremities.  The patient was just started sulfasalazine yesterday, to be additional therapy for her rheumatoid arthritis.  Of note, the patient ran out of her prednisone 2 days prior to admission.  She has been taking 25 mg daily, and took her dose this morning.     The in the emergency department, she was found to have a blood pressure of 220 on arrival.  Daughter reports that her baseline blood pressure runs low, 110-130.  She received 2 doses of Labetalol 20mg IV x 2 with improvement in her blood pressure to the 180s.  Head CT and MRI were  negative for any acute process.  Labs were notable for a trop 0.025.  She was admitted to Hospital Medicine for further management.           Hospital Course:  Hospital Course  Admitted to ICU with Encephalopathy with delirium etiology of encephalopathy unclear but likely viral encephalitis.s/p IR guided LP (12/24/18). Cell count in CSF not consistent with bacterial meningitis, continue on acyclovir but d/c vanc and cefipime, remains afebrile and WBC stable.  repeated EEG x2 with no signs of seizure activity , MRI not consistent with PRESS, or meningeal enhancement . Acute on chronic renal failure, cr improving. Immune compromised due to chronic Plaquenil (on hold) and  Steroid use for Rheumatoid arthritis      LP 12/24/18 12/27: stepped down to floor, continue acyclovir, resume coreg and HCQ, continue to hold sulfasalazine  12/28: DC bactrim as pt was finished w it prior to this admission, resume home lasix 40mg BID today  12/29: wean prednisone to 20mg td;    Interval History: pt disoriented to place still, but feels good, no complaints    Review of Systems  Objective:     Vital Signs (Most Recent):  Temp: 98 °F (36.7 °C) (12/29/18 1150)  Pulse: 76 (12/29/18 1222)  Resp: 18 (12/29/18 1150)  BP: 127/60 (12/29/18 1150)  SpO2: 99 % (12/29/18 1150) Vital Signs (24h Range):  Temp:  [96.5 °F (35.8 °C)-98.9 °F (37.2 °C)] 98 °F (36.7 °C)  Pulse:  [76-87] 76  Resp:  [18-20] 18  SpO2:  [94 %-99 %] 99 %  BP: (124-165)/(58-74) 127/60     Weight: 72.4 kg (159 lb 9.8 oz)  Body mass index is 28.27 kg/m².    Intake/Output Summary (Last 24 hours) at 12/29/2018 1223  Last data filed at 12/29/2018 0600  Gross per 24 hour   Intake 360 ml   Output 675 ml   Net -315 ml      Physical Exam   HENT:   Head: Normocephalic and atraumatic.   Mouth/Throat: Oropharynx is clear and moist. No oropharyngeal exudate.   Eyes: EOM are normal. Pupils are equal, round, and reactive to light. Right eye exhibits no discharge. Left eye exhibits no  discharge. No scleral icterus.   Neck: Normal range of motion. Neck supple. No JVD present. No tracheal deviation present.   Cardiovascular: Normal rate, regular rhythm and normal heart sounds. Exam reveals no gallop and no friction rub.   No murmur heard.  Pulmonary/Chest: Effort normal and breath sounds normal. No stridor. No respiratory distress. She has no wheezes.   Abdominal: Soft. She exhibits distension. She exhibits no mass. There is no tenderness. There is no guarding.   Musculoskeletal: Normal range of motion. She exhibits no edema or deformity.   Lymphadenopathy:     She has no cervical adenopathy.   Neurological: She displays normal reflexes. She exhibits abnormal muscle tone. Coordination normal.   Oriented to time and person, not place, Follows commands, very mild weakness on the right side from prior stroke   Skin: Skin is warm and dry. Capillary refill takes less than 2 seconds.       Significant Labs:   Recent Lab Results       12/29/18  0816   12/28/18  1711   12/28/18  1404        Immature Granulocytes 3.0         Immature Grans (Abs) 0.43  Comment:  Mild elevation in immature granulocytes is non specific and   can be seen in a variety of conditions including stress response,   acute inflammation, trauma and pregnancy. Correlation with other   laboratory and clinical findings is essential.           Anion Gap 8         Appearance, UA     Hazy     Bacteria, UA     Rare     Baso # 0.04         Basophil% 0.3         Bilirubin (UA)     Negative     BUN, Bld 38         C difficile Toxins A+B, EIA   Negative  Comment:  Testing not recommended for children <24 months old.       C. diff Antigen   Positive       C. diff PCR   Negative       Calcium 9.0         Chloride 111         CO2 24         Color, UA     Yellow     Creatinine 1.3         Differential Method Automated         eGFR if  44.5         eGFR if non  38.6  Comment:  Calculation used to obtain the estimated  glomerular filtration  rate (eGFR) is the CKD-EPI equation.            Eos # 0.1         Eosinophil% 1.0         Glucose 97         Glucose, UA     2+     Gran # (ANC) 8.4         Gran% 59.0         Hematocrit 36.8         Hemoglobin 11.0         Hyaline Casts, UA     0     Ketones, UA     Negative     Leukocytes, UA     3+     Lymph # 3.0         Lymph% 21.2         Magnesium 1.6         MCH 26.9         MCHC 29.9         MCV 90         Microscopic Comment     SEE COMMENT  Comment:  Other formed elements not mentioned in the report are not   present in the microscopic examination.        Mono # 2.2         Mono% 15.5         MPV 10.3         Nitrite, UA     Negative     Non-Squam Epith     4     nRBC 0         Occult Blood UA     Negative     pH, UA     5.0     Phosphorus 3.3         Platelets 218         Potassium 3.6         Protein, UA     1+  Comment:  Recommend a 24 hour urine protein or a urine   protein/creatinine ratio if globulin induced proteinuria is  clinically suspected.       RBC 4.09         RBC, UA     8     RDW 15.4         Sodium 143         Specific Gravity, UA     1.020     Specimen UA     Urine, Clean Catch     Squam Epithel, UA     12     WBC Clumps, UA     Occasional     WBC, UA     >100     WBC 14.20         Yeast, UA     Many         All pertinent labs within the past 24 hours have been reviewed.    Significant Imaging: I have reviewed all pertinent imaging results/findings within the past 24 hours.    Assessment/Plan:      * Acute encephalopathy    84 y/o F with RA (chronically on steroids, plaquenil and sulfasalazine), history of multiple TIA's (on aspirin and plavix),  And vascular dementia presents to the ED on 12/17/18 with a change in her mentation status with blurry vision (baseline able to perform all ADLS exceptionally) as well as right sided leg pain. The patient denies any numbness or tingling in any of her extremities. Of note, the patient ran out of her prednisone 2 days prior  to admission. She has been taking 25 mg daily. On arrival to the ED patient had SBP in the 220 (BL -130). She received 2 doses of Labetalol 20mg IV x 2 with improvement in her blood pressure to the 180s.  Head CT and MRI in the ED were negative for any acute process. EEG 12/19 showed toxic medical encephalopathy without epileptic waves. Repeat MRI does not show any intracranial processes.     Plan  1) IR to obtain LP as we had three failed attempts on the floor. On 12/21 patient did not obtain LP on patient as she lost peripheral access when she went downstairs. Patient mental status is waxing and waning. S/p LP 12/24. 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  stopped Cefepime 12/26/18  2) Encephalopathy initially thought to be hypertensive as patient had blurry vision as well as hypertension. CT and MRI negative for stroke. BP control, pt unable to take orally. On IV hydralazine prn for  SBP>180. Reviewed images with radiology again and images do not indicate PRES or temporal lobe enhancement.   3)stopped vancomycin 12/25 (CSF unrevealing so far) continue cefepime, and acyclovir. Anticipation to stop Cefepime tomorrow 12/26   4) stopped trazodone 12/25   5) EEG twice were negative for seizure activity.   6) Neurology following up and at this time, we are treating for viral encephalitis    HSV neg,VZV and VDRL pending.     DC acyclovir   EEG not needed   Patient is immunocompromised 2/2 RA medication (steroids, DMARD); likely viral encephalitis picture, which continues to improve        Acute renal failure superimposed on stage 3 chronic kidney disease    -- baseline Creatine 1.1.12/20 FC was inserted due to retention.   -- Cr is improving today   -- Urine lytes ordered- urine sodium <20, creatinine 174  -- strict in and out. Please  Match I/O  -- avoid nephrotoxic medication. Properly renally dosed antibiotics.     Long-term use of immunosuppressant medication    · Follows with Dr. Rouse of Rheumatology    S/p  8 days of iv solumedrol 20mg daily while in the ICU  · On Prednisone 25mg PO daily , wean down to 20mg today  · Hold Sulfasalazine 1g PO BID while still pending workup results for viral encephalitis  · Resume Plaquenil 400mg PO daily    DC Bactrim       Seropositive rheumatoid arthritis of multiple sites           Hypertension, essential    · Head CT and MRI negative for acute process. Repeat MRI negative which shows no acute intracranial abnormality.   · Takes Coreg 12.5mg PO BID at home.       Resume home coreg   Resume lasix 20mg po BID today     Vascular dementia           UTI (urinary tract infection)    UA positive   Rocephin iv for 3 days       Poor nutrition    Boost  Speech eval: Dental Soft, Liquid Diet Level: Thin            Altered mental status    See above  Delirium precautious  [place] for sleep       Delirium superimposed on dementia    Delirium precautions       Dystonia    · Follows with Dr. Giang of Neurology  · Hold Gabapentin and Baclofen  for now given AMS            VTE Risk Mitigation (From admission, onward)        Ordered     heparin (porcine) injection 5,000 Units  Every 8 hours      12/25/18 1045     Place sequential compression device  Until discontinued      12/21/18 1024     IP VTE HIGH RISK PATIENT  Once      12/16/18 1933              Kiran Perez MD  Department of Hospital Medicine   [facility]

## 2018-12-29 NOTE — PROGRESS NOTES
Pt daughter requested meds to stop diarrhea MD paged. MD verbally stated that CDiff must be ruled out before any meds are ordered but continue to push fluids to keep pt hydrated.

## 2018-12-29 NOTE — SUBJECTIVE & OBJECTIVE
Interval History: pt disoriented to place still, but feels good, no complaints    Review of Systems  Objective:     Vital Signs (Most Recent):  Temp: 98 °F (36.7 °C) (12/29/18 1150)  Pulse: 76 (12/29/18 1222)  Resp: 18 (12/29/18 1150)  BP: 127/60 (12/29/18 1150)  SpO2: 99 % (12/29/18 1150) Vital Signs (24h Range):  Temp:  [96.5 °F (35.8 °C)-98.9 °F (37.2 °C)] 98 °F (36.7 °C)  Pulse:  [76-87] 76  Resp:  [18-20] 18  SpO2:  [94 %-99 %] 99 %  BP: (124-165)/(58-74) 127/60     Weight: 72.4 kg (159 lb 9.8 oz)  Body mass index is 28.27 kg/m².    Intake/Output Summary (Last 24 hours) at 12/29/2018 1223  Last data filed at 12/29/2018 0600  Gross per 24 hour   Intake 360 ml   Output 675 ml   Net -315 ml      Physical Exam   HENT:   Head: Normocephalic and atraumatic.   Mouth/Throat: Oropharynx is clear and moist. No oropharyngeal exudate.   Eyes: EOM are normal. Pupils are equal, round, and reactive to light. Right eye exhibits no discharge. Left eye exhibits no discharge. No scleral icterus.   Neck: Normal range of motion. Neck supple. No JVD present. No tracheal deviation present.   Cardiovascular: Normal rate, regular rhythm and normal heart sounds. Exam reveals no gallop and no friction rub.   No murmur heard.  Pulmonary/Chest: Effort normal and breath sounds normal. No stridor. No respiratory distress. She has no wheezes.   Abdominal: Soft. She exhibits distension. She exhibits no mass. There is no tenderness. There is no guarding.   Musculoskeletal: Normal range of motion. She exhibits no edema or deformity.   Lymphadenopathy:     She has no cervical adenopathy.   Neurological: She displays normal reflexes. She exhibits abnormal muscle tone. Coordination normal.   Oriented to time and person, not place, Follows commands, very mild weakness on the right side from prior stroke   Skin: Skin is warm and dry. Capillary refill takes less than 2 seconds.       Significant Labs:   Recent Lab Results       12/29/18  0808    12/28/18  1711   12/28/18  1404        Immature Granulocytes 3.0         Immature Grans (Abs) 0.43  Comment:  Mild elevation in immature granulocytes is non specific and   can be seen in a variety of conditions including stress response,   acute inflammation, trauma and pregnancy. Correlation with other   laboratory and clinical findings is essential.           Anion Gap 8         Appearance, UA     Hazy     Bacteria, UA     Rare     Baso # 0.04         Basophil% 0.3         Bilirubin (UA)     Negative     BUN, Bld 38         C difficile Toxins A+B, EIA   Negative  Comment:  Testing not recommended for children <24 months old.       C. diff Antigen   Positive       C. diff PCR   Negative       Calcium 9.0         Chloride 111         CO2 24         Color, UA     Yellow     Creatinine 1.3         Differential Method Automated         eGFR if  44.5         eGFR if non  38.6  Comment:  Calculation used to obtain the estimated glomerular filtration  rate (eGFR) is the CKD-EPI equation.            Eos # 0.1         Eosinophil% 1.0         Glucose 97         Glucose, UA     2+     Gran # (ANC) 8.4         Gran% 59.0         Hematocrit 36.8         Hemoglobin 11.0         Hyaline Casts, UA     0     Ketones, UA     Negative     Leukocytes, UA     3+     Lymph # 3.0         Lymph% 21.2         Magnesium 1.6         MCH 26.9         MCHC 29.9         MCV 90         Microscopic Comment     SEE COMMENT  Comment:  Other formed elements not mentioned in the report are not   present in the microscopic examination.        Mono # 2.2         Mono% 15.5         MPV 10.3         Nitrite, UA     Negative     Non-Squam Epith     4     nRBC 0         Occult Blood UA     Negative     pH, UA     5.0     Phosphorus 3.3         Platelets 218         Potassium 3.6         Protein, UA     1+  Comment:  Recommend a 24 hour urine protein or a urine   protein/creatinine ratio if globulin induced proteinuria  is  clinically suspected.       RBC 4.09         RBC, UA     8     RDW 15.4         Sodium 143         Specific Gravity, UA     1.020     Specimen UA     Urine, Clean Catch     Squam Epithel, UA     12     WBC Clumps, UA     Occasional     WBC, UA     >100     WBC 14.20         Yeast, UA     Many         All pertinent labs within the past 24 hours have been reviewed.    Significant Imaging: I have reviewed all pertinent imaging results/findings within the past 24 hours.

## 2018-12-29 NOTE — ASSESSMENT & PLAN NOTE
· Follows with Dr. Rouse of Rheumatology    S/p 8 days of iv solumedrol 20mg daily while in the ICU  · On Prednisone 25mg PO daily , wean down to 20mg today  · Hold Sulfasalazine 1g PO BID while still pending workup results for viral encephalitis  · Resume Plaquenil 400mg PO daily    DC Bactrim

## 2018-12-30 LAB
ACE CSF-CCNC: 0.9 U/L (ref 0–2.5)
ANION GAP SERPL CALC-SCNC: 10 MMOL/L
BASOPHILS # BLD AUTO: 0.03 K/UL
BASOPHILS NFR BLD: 0.2 %
BUN SERPL-MCNC: 31 MG/DL
CALCIUM SERPL-MCNC: 8.8 MG/DL
CHLORIDE SERPL-SCNC: 107 MMOL/L
CO2 SERPL-SCNC: 25 MMOL/L
CREAT SERPL-MCNC: 1.1 MG/DL
DIFFERENTIAL METHOD: ABNORMAL
EOSINOPHIL # BLD AUTO: 0.1 K/UL
EOSINOPHIL NFR BLD: 0.8 %
ERYTHROCYTE [DISTWIDTH] IN BLOOD BY AUTOMATED COUNT: 15.6 %
EST. GFR  (AFRICAN AMERICAN): 54.4 ML/MIN/1.73 M^2
EST. GFR  (NON AFRICAN AMERICAN): 47.2 ML/MIN/1.73 M^2
GLUCOSE SERPL-MCNC: 74 MG/DL
HCT VFR BLD AUTO: 38 %
HGB BLD-MCNC: 11.1 G/DL
IMM GRANULOCYTES # BLD AUTO: 0.36 K/UL
IMM GRANULOCYTES NFR BLD AUTO: 2.7 %
LYMPHOCYTES # BLD AUTO: 2.5 K/UL
LYMPHOCYTES NFR BLD: 19 %
MCH RBC QN AUTO: 26.9 PG
MCHC RBC AUTO-ENTMCNC: 29.2 G/DL
MCV RBC AUTO: 92 FL
MONOCYTES # BLD AUTO: 2 K/UL
MONOCYTES NFR BLD: 15 %
NEUTROPHILS # BLD AUTO: 8.2 K/UL
NEUTROPHILS NFR BLD: 62.3 %
NRBC BLD-RTO: 0 /100 WBC
PLATELET # BLD AUTO: 217 K/UL
PMV BLD AUTO: 10.8 FL
POTASSIUM SERPL-SCNC: 3.9 MMOL/L
RBC # BLD AUTO: 4.12 M/UL
SODIUM SERPL-SCNC: 142 MMOL/L
WBC # BLD AUTO: 13.21 K/UL

## 2018-12-30 PROCEDURE — 63600175 PHARM REV CODE 636 W HCPCS: Performed by: STUDENT IN AN ORGANIZED HEALTH CARE EDUCATION/TRAINING PROGRAM

## 2018-12-30 PROCEDURE — 25000003 PHARM REV CODE 250: Performed by: STUDENT IN AN ORGANIZED HEALTH CARE EDUCATION/TRAINING PROGRAM

## 2018-12-30 PROCEDURE — 80048 BASIC METABOLIC PNL TOTAL CA: CPT

## 2018-12-30 PROCEDURE — 85025 COMPLETE CBC W/AUTO DIFF WBC: CPT

## 2018-12-30 PROCEDURE — 11000001 HC ACUTE MED/SURG PRIVATE ROOM

## 2018-12-30 PROCEDURE — 36415 COLL VENOUS BLD VENIPUNCTURE: CPT

## 2018-12-30 PROCEDURE — 99231 PR SUBSEQUENT HOSPITAL CARE,LEVL I: ICD-10-PCS | Mod: GC,,, | Performed by: INTERNAL MEDICINE

## 2018-12-30 PROCEDURE — 99231 SBSQ HOSP IP/OBS SF/LOW 25: CPT | Mod: GC,,, | Performed by: INTERNAL MEDICINE

## 2018-12-30 RX ORDER — CEFTRIAXONE 1 G/1
1 INJECTION, POWDER, FOR SOLUTION INTRAMUSCULAR; INTRAVENOUS
Status: DISCONTINUED | OUTPATIENT
Start: 2018-12-30 | End: 2018-12-31 | Stop reason: HOSPADM

## 2018-12-30 RX ADMIN — CARVEDILOL 12.5 MG: 12.5 TABLET, FILM COATED ORAL at 09:12

## 2018-12-30 RX ADMIN — FUROSEMIDE 20 MG: 20 TABLET ORAL at 09:12

## 2018-12-30 RX ADMIN — HEPARIN SODIUM 5000 UNITS: 5000 INJECTION, SOLUTION INTRAVENOUS; SUBCUTANEOUS at 10:12

## 2018-12-30 RX ADMIN — HEPARIN SODIUM 5000 UNITS: 5000 INJECTION, SOLUTION INTRAVENOUS; SUBCUTANEOUS at 01:12

## 2018-12-30 RX ADMIN — HYDROXYCHLOROQUINE SULFATE 400 MG: 200 TABLET, FILM COATED ORAL at 09:12

## 2018-12-30 RX ADMIN — POTASSIUM & SODIUM PHOSPHATES POWDER PACK 280-160-250 MG 2 PACKET: 280-160-250 PACK at 06:12

## 2018-12-30 RX ADMIN — ACETAMINOPHEN 650 MG: 325 TABLET ORAL at 08:12

## 2018-12-30 RX ADMIN — CLOPIDOGREL 75 MG: 75 TABLET, FILM COATED ORAL at 09:12

## 2018-12-30 RX ADMIN — CEFTRIAXONE SODIUM 1 G: 1 INJECTION, POWDER, FOR SOLUTION INTRAMUSCULAR; INTRAVENOUS at 09:12

## 2018-12-30 RX ADMIN — CARVEDILOL 12.5 MG: 12.5 TABLET, FILM COATED ORAL at 05:12

## 2018-12-30 RX ADMIN — MAGNESIUM SULFATE HEPTAHYDRATE 1 G: 500 INJECTION, SOLUTION INTRAMUSCULAR; INTRAVENOUS at 10:12

## 2018-12-30 RX ADMIN — RAMELTEON 8 MG: 8 TABLET, FILM COATED ORAL at 08:12

## 2018-12-30 RX ADMIN — PREDNISONE 20 MG: 20 TABLET ORAL at 09:12

## 2018-12-30 RX ADMIN — ASPIRIN 81 MG CHEWABLE TABLET 81 MG: 81 TABLET CHEWABLE at 09:12

## 2018-12-30 NOTE — PROGRESS NOTES
[facility] Hospital Medicine  Progress Note    Patient Name: Selma Alonzo Lux MD  MRN: [id number]  Patient Class: IP- Inpatient   Admission Date: 12/16/2018  Length of Stay: 11 days  Attending Physician: Isis Singh MD  Primary Care Provider: Bhargav Hirsch MD    [facility] Medicine Team: [facility] HOSP MED 3 Kiran Perez MD    Subjective:     Principal Problem:Acute encephalopathy    HPI:  Ms. Hahn Alonzo Lux MD is a 83 y.o. female with rheumatoid arthritis on steroids, Plaquenil and Sulfasalazine, history of stroke on Aspirin/Plavix, and vascular dementia who presents to the emergency department with her daughter because of confusion.  History is obtained from the daughter, as the patient is not able to provide a good history.  The daughter mentions that at baseline, the patient is very independent and is very sharp.  However, the day prior to admission the patient was endorsing some blurred vision, having difficulty eating saying that she could not see her plate, and was having unusual behavior-like sitting on her bedroom floor, claiming that someone moved her bed causing her to fall.  She has been having tangential speech requiring frequent redirection.  Additionally, she was complaining of some right-sided leg pain, which is unusual, as she normally complaints of left-sided leg pain.  The patient denies any numbness or tingling in any of her extremities.  The patient was just started sulfasalazine yesterday, to be additional therapy for her rheumatoid arthritis.  Of note, the patient ran out of her prednisone 2 days prior to admission.  She has been taking 25 mg daily, and took her dose this morning.     The in the emergency department, she was found to have a blood pressure of 220 on arrival.  Daughter reports that her baseline blood pressure runs low, 110-130.  She received 2 doses of Labetalol 20mg IV x 2 with improvement in her blood pressure to the 180s.  Head CT and MRI were  negative for any acute process.  Labs were notable for a trop 0.025.  She was admitted to Hospital Medicine for further management.           Hospital Course:  Hospital Course  Admitted to ICU with Encephalopathy with delirium etiology of encephalopathy unclear but likely viral encephalitis.s/p IR guided LP (12/24/18). Cell count in CSF not consistent with bacterial meningitis, continue on acyclovir but d/c vanc and cefipime, remains afebrile and WBC stable.  repeated EEG x2 with no signs of seizure activity , MRI not consistent with PRESS, or meningeal enhancement . Acute on chronic renal failure, cr improving. Immune compromised due to chronic Plaquenil (on hold) and  Steroid use for Rheumatoid arthritis      LP 12/24/18 12/27: stepped down to floor, continue acyclovir, resume coreg and HCQ, continue to hold sulfasalazine  12/28: DC bactrim as pt was finished w it prior to this admission, resume home lasix 40mg BID today  12/29: wean prednisone to 20mg td;    Interval History: pt said she feels better, no compliants, still slow on conversation and thoughts and memory, and AAOx3    Review of Systems  Objective:     Vital Signs (Most Recent):  Temp: 98.3 °F (36.8 °C) (12/30/18 0733)  Pulse: 80 (12/30/18 1121)  Resp: 16 (12/30/18 0733)  BP: 132/60 (12/30/18 0733)  SpO2: 98 % (12/30/18 0733) Vital Signs (24h Range):  Temp:  [98 °F (36.7 °C)-98.8 °F (37.1 °C)] 98.3 °F (36.8 °C)  Pulse:  [76-87] 80  Resp:  [16-20] 16  SpO2:  [96 %-99 %] 98 %  BP: (127-145)/(60-69) 132/60     Weight: 72.4 kg (159 lb 9.8 oz)  Body mass index is 28.27 kg/m².    Intake/Output Summary (Last 24 hours) at 12/30/2018 1121  Last data filed at 12/29/2018 2300  Gross per 24 hour   Intake 150 ml   Output --   Net 150 ml      Physical Exam   Constitutional: She is oriented to person, place, and time.   HENT:   Head: Normocephalic and atraumatic.   Mouth/Throat: Oropharynx is clear and moist. No oropharyngeal exudate.   Eyes: EOM are normal.  Pupils are equal, round, and reactive to light. Right eye exhibits no discharge. Left eye exhibits no discharge. No scleral icterus.   Neck: Normal range of motion. Neck supple. No JVD present. No tracheal deviation present.   Cardiovascular: Normal rate, regular rhythm and normal heart sounds. Exam reveals no gallop and no friction rub.   No murmur heard.  Pulmonary/Chest: Effort normal and breath sounds normal. No stridor. No respiratory distress. She has no wheezes.   Abdominal: Soft. She exhibits no distension and no mass. There is no tenderness. There is no guarding.   Musculoskeletal: Normal range of motion. She exhibits no edema or deformity.   Lymphadenopathy:     She has no cervical adenopathy.   Neurological: She is alert and oriented to person, place, and time. She displays normal reflexes. She exhibits abnormal muscle tone. Coordination normal.   Oriented to time and person, and place, Follows commands, weakness improving   Skin: Skin is warm and dry. Capillary refill takes less than 2 seconds.       Significant Labs:   Recent Lab Results       12/30/18  0349        Immature Granulocytes 2.7     Immature Grans (Abs) 0.36  Comment:  Mild elevation in immature granulocytes is non specific and   can be seen in a variety of conditions including stress response,   acute inflammation, trauma and pregnancy. Correlation with other   laboratory and clinical findings is essential.       Anion Gap 10     Baso # 0.03     Basophil% 0.2     BUN, Bld 31     Calcium 8.8     Chloride 107     CO2 25     Creatinine 1.1     Differential Method Automated     eGFR if African American 54.4     eGFR if non  47.2  Comment:  Calculation used to obtain the estimated glomerular filtration  rate (eGFR) is the CKD-EPI equation.        Eos # 0.1     Eosinophil% 0.8     Glucose 74     Gran # (ANC) 8.2     Gran% 62.3     Hematocrit 38.0     Hemoglobin 11.1     Lymph # 2.5     Lymph% 19.0     MCH 26.9     MCHC 29.2      MCV 92     Mono # 2.0     Mono% 15.0     MPV 10.8     nRBC 0     Platelets 217     Potassium 3.9     RBC 4.12     RDW 15.6     Sodium 142     WBC 13.21         All pertinent labs within the past 24 hours have been reviewed.    Significant Imaging: I have reviewed all pertinent imaging results/findings within the past 24 hours.    Assessment/Plan:      * Acute encephalopathy    82 y/o F with RA (chronically on steroids, plaquenil and sulfasalazine), history of multiple TIA's (on aspirin and plavix),  And vascular dementia presents to the ED on 12/17/18 with a change in her mentation status with blurry vision (baseline able to perform all ADLS exceptionally) as well as right sided leg pain. The patient denies any numbness or tingling in any of her extremities. Of note, the patient ran out of her prednisone 2 days prior to admission. She has been taking 25 mg daily. On arrival to the ED patient had SBP in the 220 (BL -130). She received 2 doses of Labetalol 20mg IV x 2 with improvement in her blood pressure to the 180s.  Head CT and MRI in the ED were negative for any acute process. EEG 12/19 showed toxic medical encephalopathy without epileptic waves. Repeat MRI does not show any intracranial processes.     Plan  1) IR to obtain LP as we had three failed attempts on the floor. On 12/21 patient did not obtain LP on patient as she lost peripheral access when she went downstairs. Patient mental status is waxing and waning. S/p LP 12/24. 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  stopped Cefepime 12/26/18  2) Encephalopathy initially thought to be hypertensive as patient had blurry vision as well as hypertension. CT and MRI negative for stroke. BP control, pt unable to take orally. On IV hydralazine prn for  SBP>180. Reviewed images with radiology again and images do not indicate PRES or temporal lobe enhancement.   3)stopped vancomycin 12/25 (CSF unrevealing so far) continue cefepime, and acyclovir. Anticipation to  stop Cefepime tomorrow 12/26   4) stopped trazodone 12/25   5) EEG twice were negative for seizure activity.   6) Neurology following up and at this time, we are treating for viral encephalitis    HSV neg,VZV and VDRL pending.     DC acyclovir   EEG not needed   Patient is immunocompromised 2/2 RA medication (steroids, DMARD); likely viral encephalitis picture, which continues to improve        Acute renal failure superimposed on stage 3 chronic kidney disease    -- baseline Creatine 1.1.12/20 FC was inserted due to retention.   -- Cr is improving today   -- Urine lytes ordered- urine sodium <20, creatinine 174  -- strict in and out. Please  Match I/O  -- avoid nephrotoxic medication. Properly renally dosed antibiotics.     Long-term use of immunosuppressant medication    · Follows with Dr. Rouse of Rheumatology    S/p 8 days of iv solumedrol 20mg daily while in the ICU  · On Prednisone 25mg PO daily , wean down to 20mg today  · Hold Sulfasalazine 1g PO BID while still pending workup results for viral encephalitis  · Resume Plaquenil 400mg PO daily    DC Bactrim       Seropositive rheumatoid arthritis of multiple sites           Hypertension, essential    · Head CT and MRI negative for acute process. Repeat MRI negative which shows no acute intracranial abnormality.   · Takes Coreg 12.5mg PO BID at home.       Resume home coreg   Resume lasix 20mg po BID today     Vascular dementia           UTI (urinary tract infection)    UA positive   Rocephin iv for 3 days       Poor nutrition    Boost  Speech eval: Dental Soft, Liquid Diet Level: Thin            Altered mental status    See above  Delirium precautious  [place] for sleep       Delirium superimposed on dementia    Delirium precautions       Dystonia    · Follows with Dr. Giang of Neurology  · Hold Gabapentin and Baclofen  for now given AMS            VTE Risk Mitigation (From admission, onward)        Ordered     heparin (porcine) injection 5,000 Units  Every  8 hours      12/25/18 1045     Place sequential compression device  Until discontinued      12/21/18 1024     IP VTE HIGH RISK PATIENT  Once      12/16/18 1933              Kiran Perez MD  Department of Hospital Medicine   [facility]

## 2018-12-30 NOTE — PLAN OF CARE
Problem: Adult Inpatient Plan of Care  Goal: Plan of Care Review  Outcome: Ongoing (interventions implemented as appropriate)   12/30/18 0254   Plan of Care Review   Plan of Care Reviewed With patient   Progress improving

## 2018-12-30 NOTE — PLAN OF CARE
Problem: Adult Inpatient Plan of Care  Goal: Plan of Care Review  Outcome: Ongoing (interventions implemented as appropriate)  Patient currently awaiting placement for discharge.  Daughter is in the room at bedside and Med team 3 has been notified to come speak with daughter.  Patient has been doing good today with no complaints other than weakness.  Up in room and to bathroom with one person minimal assistance and cane use.  Last BM yesterday and has voided twice today.  Will continue to monitor BP, mental status and pain.  Patient is presently AAOx4.

## 2018-12-30 NOTE — PHYSICIAN QUERY
PT Name: Selma Lux MD  MR #: [id number]     PHYSICIAN QUERY -  ELECTROLYTE CLARIFICATION      CDS/: Migdalia Spears               Contact information: [phone]  This form is a permanent document in the medical record.     Query Date: December 30, 2018    By submitting this query, we are merely seeking further clarification of documentation to reflect the severity of illness of your patient. Please utilize your independent clinical judgment when addressing the question(s) below.    The Medical record reflects the following:     Indicators   Supporting Clinical Findings Location in Medical Record   x Lab Value(s) Potassium 3.9, 3.0, 3.9    Phosphate 2.5, 1.9, 3.3 Labs 1216, 12/18, 12/30    Labs 12/17, 12/27, 12/30   x Treatment                                 Medication Potassium Sodium Phosphate 280-160-250 mg Package X 2 Mar 12/29 X 2, 12/30    Other       Provider, please specify the diagnosis or diagnoses that correspond(s) to the above indicators. Charles all that apply:    [  X ] Hypokalemia   [ X  ] Hypophosphatemia   [   ] Other electrolyte disturbance (please specify): _______   [   ]  Clinically Undetermined       Please document in your progress notes daily for the duration of treatment until resolved, and include in your discharge summary.

## 2018-12-30 NOTE — SUBJECTIVE & OBJECTIVE
Interval History: pt said she feels better, no compliants, still slow on conversation and thoughts and memory, and AAOx3    Review of Systems  Objective:     Vital Signs (Most Recent):  Temp: 98.3 °F (36.8 °C) (12/30/18 0733)  Pulse: 80 (12/30/18 1121)  Resp: 16 (12/30/18 0733)  BP: 132/60 (12/30/18 0733)  SpO2: 98 % (12/30/18 0733) Vital Signs (24h Range):  Temp:  [98 °F (36.7 °C)-98.8 °F (37.1 °C)] 98.3 °F (36.8 °C)  Pulse:  [76-87] 80  Resp:  [16-20] 16  SpO2:  [96 %-99 %] 98 %  BP: (127-145)/(60-69) 132/60     Weight: 72.4 kg (159 lb 9.8 oz)  Body mass index is 28.27 kg/m².    Intake/Output Summary (Last 24 hours) at 12/30/2018 1121  Last data filed at 12/29/2018 2300  Gross per 24 hour   Intake 150 ml   Output --   Net 150 ml      Physical Exam   Constitutional: She is oriented to person, place, and time.   HENT:   Head: Normocephalic and atraumatic.   Mouth/Throat: Oropharynx is clear and moist. No oropharyngeal exudate.   Eyes: EOM are normal. Pupils are equal, round, and reactive to light. Right eye exhibits no discharge. Left eye exhibits no discharge. No scleral icterus.   Neck: Normal range of motion. Neck supple. No JVD present. No tracheal deviation present.   Cardiovascular: Normal rate, regular rhythm and normal heart sounds. Exam reveals no gallop and no friction rub.   No murmur heard.  Pulmonary/Chest: Effort normal and breath sounds normal. No stridor. No respiratory distress. She has no wheezes.   Abdominal: Soft. She exhibits no distension and no mass. There is no tenderness. There is no guarding.   Musculoskeletal: Normal range of motion. She exhibits no edema or deformity.   Lymphadenopathy:     She has no cervical adenopathy.   Neurological: She is alert and oriented to person, place, and time. She displays normal reflexes. She exhibits abnormal muscle tone. Coordination normal.   Oriented to time and person, and place, Follows commands, weakness improving   Skin: Skin is warm and dry.  Capillary refill takes less than 2 seconds.       Significant Labs:   Recent Lab Results       12/30/18  0349        Immature Granulocytes 2.7     Immature Grans (Abs) 0.36  Comment:  Mild elevation in immature granulocytes is non specific and   can be seen in a variety of conditions including stress response,   acute inflammation, trauma and pregnancy. Correlation with other   laboratory and clinical findings is essential.       Anion Gap 10     Baso # 0.03     Basophil% 0.2     BUN, Bld 31     Calcium 8.8     Chloride 107     CO2 25     Creatinine 1.1     Differential Method Automated     eGFR if African American 54.4     eGFR if non  47.2  Comment:  Calculation used to obtain the estimated glomerular filtration  rate (eGFR) is the CKD-EPI equation.        Eos # 0.1     Eosinophil% 0.8     Glucose 74     Gran # (ANC) 8.2     Gran% 62.3     Hematocrit 38.0     Hemoglobin 11.1     Lymph # 2.5     Lymph% 19.0     MCH 26.9     MCHC 29.2     MCV 92     Mono # 2.0     Mono% 15.0     MPV 10.8     nRBC 0     Platelets 217     Potassium 3.9     RBC 4.12     RDW 15.6     Sodium 142     WBC 13.21         All pertinent labs within the past 24 hours have been reviewed.    Significant Imaging: I have reviewed all pertinent imaging results/findings within the past 24 hours.

## 2018-12-31 VITALS
TEMPERATURE: 98 F | OXYGEN SATURATION: 99 % | HEART RATE: 76 BPM | DIASTOLIC BLOOD PRESSURE: 63 MMHG | HEIGHT: 63 IN | SYSTOLIC BLOOD PRESSURE: 138 MMHG | BODY MASS INDEX: 28.29 KG/M2 | WEIGHT: 159.63 LBS | RESPIRATION RATE: 20 BRPM

## 2018-12-31 LAB
ANION GAP SERPL CALC-SCNC: 12 MMOL/L
BASOPHILS # BLD AUTO: 0.03 K/UL
BASOPHILS NFR BLD: 0.3 %
BUN SERPL-MCNC: 25 MG/DL
CALCIUM SERPL-MCNC: 9.1 MG/DL
CHLORIDE SERPL-SCNC: 108 MMOL/L
CO2 SERPL-SCNC: 25 MMOL/L
CREAT SERPL-MCNC: 1.2 MG/DL
DIFFERENTIAL METHOD: ABNORMAL
EOSINOPHIL # BLD AUTO: 0.1 K/UL
EOSINOPHIL NFR BLD: 1 %
ERYTHROCYTE [DISTWIDTH] IN BLOOD BY AUTOMATED COUNT: 15.8 %
EST. GFR  (AFRICAN AMERICAN): 49 ML/MIN/1.73 M^2
EST. GFR  (NON AFRICAN AMERICAN): 42.5 ML/MIN/1.73 M^2
GLUCOSE SERPL-MCNC: 62 MG/DL
HCT VFR BLD AUTO: 37.6 %
HGB BLD-MCNC: 11.4 G/DL
IMM GRANULOCYTES # BLD AUTO: 0.18 K/UL
IMM GRANULOCYTES NFR BLD AUTO: 1.6 %
LYMPHOCYTES # BLD AUTO: 2.3 K/UL
LYMPHOCYTES NFR BLD: 19.8 %
MCH RBC QN AUTO: 27.5 PG
MCHC RBC AUTO-ENTMCNC: 30.3 G/DL
MCV RBC AUTO: 91 FL
MONOCYTES # BLD AUTO: 1.8 K/UL
MONOCYTES NFR BLD: 15.3 %
NEUTROPHILS # BLD AUTO: 7.2 K/UL
NEUTROPHILS NFR BLD: 62 %
NRBC BLD-RTO: 0 /100 WBC
PLATELET # BLD AUTO: 241 K/UL
PMV BLD AUTO: 11.1 FL
POTASSIUM SERPL-SCNC: 4.3 MMOL/L
RBC # BLD AUTO: 4.14 M/UL
SODIUM SERPL-SCNC: 145 MMOL/L
VDRL CSF QL: NORMAL
WBC # BLD AUTO: 11.54 K/UL

## 2018-12-31 PROCEDURE — 63600175 PHARM REV CODE 636 W HCPCS: Performed by: STUDENT IN AN ORGANIZED HEALTH CARE EDUCATION/TRAINING PROGRAM

## 2018-12-31 PROCEDURE — 97803 MED NUTRITION INDIV SUBSEQ: CPT

## 2018-12-31 PROCEDURE — 97110 THERAPEUTIC EXERCISES: CPT

## 2018-12-31 PROCEDURE — 97116 GAIT TRAINING THERAPY: CPT

## 2018-12-31 PROCEDURE — 80048 BASIC METABOLIC PNL TOTAL CA: CPT

## 2018-12-31 PROCEDURE — 36415 COLL VENOUS BLD VENIPUNCTURE: CPT

## 2018-12-31 PROCEDURE — 99238 PR HOSPITAL DISCHARGE DAY,<30 MIN: ICD-10-PCS | Mod: GC,,, | Performed by: INTERNAL MEDICINE

## 2018-12-31 PROCEDURE — 99238 HOSP IP/OBS DSCHRG MGMT 30/<: CPT | Mod: GC,,, | Performed by: INTERNAL MEDICINE

## 2018-12-31 PROCEDURE — 25000003 PHARM REV CODE 250: Performed by: STUDENT IN AN ORGANIZED HEALTH CARE EDUCATION/TRAINING PROGRAM

## 2018-12-31 PROCEDURE — 85025 COMPLETE CBC W/AUTO DIFF WBC: CPT

## 2018-12-31 RX ORDER — PREDNISONE 10 MG/1
TABLET ORAL
Qty: 21 TABLET | Refills: 0 | Status: SHIPPED | OUTPATIENT
Start: 2019-01-01 | End: 2019-01-15

## 2018-12-31 RX ORDER — PREDNISONE 5 MG/1
5 TABLET ORAL DAILY
Qty: 60 TABLET | Refills: 2 | Status: SHIPPED | OUTPATIENT
Start: 2019-01-15 | End: 2019-02-11

## 2018-12-31 RX ORDER — GABAPENTIN 300 MG/1
300 CAPSULE ORAL NIGHTLY
Qty: 90 CAPSULE | Refills: 2 | Status: SHIPPED | OUTPATIENT
Start: 2018-12-31 | End: 2019-05-21 | Stop reason: SDUPTHER

## 2018-12-31 RX ORDER — FUROSEMIDE 20 MG/1
20 TABLET ORAL DAILY
Start: 2018-12-31 | End: 2019-06-21 | Stop reason: SDUPTHER

## 2018-12-31 RX ADMIN — ASPIRIN 81 MG CHEWABLE TABLET 81 MG: 81 TABLET CHEWABLE at 09:12

## 2018-12-31 RX ADMIN — CARVEDILOL 12.5 MG: 12.5 TABLET, FILM COATED ORAL at 09:12

## 2018-12-31 RX ADMIN — HEPARIN SODIUM 5000 UNITS: 5000 INJECTION, SOLUTION INTRAVENOUS; SUBCUTANEOUS at 05:12

## 2018-12-31 RX ADMIN — HYDROXYCHLOROQUINE SULFATE 400 MG: 200 TABLET, FILM COATED ORAL at 09:12

## 2018-12-31 RX ADMIN — CLOPIDOGREL 75 MG: 75 TABLET, FILM COATED ORAL at 09:12

## 2018-12-31 RX ADMIN — PREDNISONE 20 MG: 20 TABLET ORAL at 09:12

## 2018-12-31 RX ADMIN — FUROSEMIDE 20 MG: 20 TABLET ORAL at 09:12

## 2018-12-31 NOTE — PT/OT/SLP PROGRESS
"Physical Therapy Treatment    Patient Name:  Selma Alonzo Lux MD   MRN:  [id number]    Recommendations:     Discharge Recommendations:  nursing facility, skilled   Discharge Equipment Recommendations: none   Barriers to discharge: Decreased caregiver support    Assessment:     Selma Alonzo Lux MD is a 83 y.o. female admitted with a medical diagnosis of Acute encephalopathy.  She presents with the following impairments/functional limitations:  weakness, impaired endurance, impaired self care skills, impaired functional mobilty, gait instability, impaired balance Pt was able to progress today with increase distance amb, inrease activity tolerated and decrease (A) required for bed mob and t/f's.  Pt with significantly improved cognition today, was alert and oriented and followed all commands.  Pt will cont to benefit from PT to return to prior level of function and decrease caregiver burden.  She is highly motivated and participates fully the entire session..    Rehab Prognosis: Good; patient would benefit from acute skilled PT services to address these deficits and reach maximum level of function.    Recent Surgery: * No surgery found *      Plan:     During this hospitalization, patient to be seen 4 x/week to address the identified rehab impairments via gait training, therapeutic exercises, therapeutic activities, neuromuscular re-education and progress toward the following goals:    · Plan of Care Expires:  01/25/19    Subjective     Chief Complaint: "The grits are too salty".  Pt reports she did not sleep too well  Patient/Family Comments/goals: " I really want to go to [place] to have more therapy before I go home"  Pain/Comfort:  · Pain Rating 1: 0/10      Objective:     Communicated with Jackie whitaker,  prior to session.  Patient found sup in bed with  telemetry, peripheral IV  upon PT entry to room.     General Precautions: Standard, fall   Orthopedic Precautions:N/A   Braces: N/A     Cognition: AxO to " person, place, situation and date. Followed multi-step commands 100% of time.    Functional Mobility:  · Bed Mobility:     · Supine to Sit: supervision  · Transfers:     · Sit to Stand:  contact guard assistance with rolling walker  · Gait: 70' with RW and Min assist for balance with pt having noted increase kyphosis in standing and gait, decreased dora and step length with decreased LYNDSEY. Cues for upright posture, safety awareness .  · Balance: sitting static/dynamic with min challenges: (S).  stand static with RW: SBA, stand dynamic with RW: Min assist      AM-PAC 6 CLICK MOBILITY  Turning over in bed (including adjusting bedclothes, sheets and blankets)?: 4  Sitting down on and standing up from a chair with arms (e.g., wheelchair, bedside commode, etc.): 3  Moving from lying on back to sitting on the side of the bed?: 3  Moving to and from a bed to a chair (including a wheelchair)?: 3  Need to walk in hospital room?: 3  Climbing 3-5 steps with a railing?: 1  Basic Mobility Total Score: 17       Therapeutic Activities and Exercises:   PT ed pt on PT POC, safety awareness, placement of hands during t/f's, LE ex's and she verbalized good understanding back to PT  PT instructed pt to call for nsg when ready to get up and she agreed. Pt instructed on and performed B LE ex's to increase endurance and decrease (A) with mobility: toe raises, Heel raises, LAQ, hip flex and hip abd/add x 20 reps each LE. Pt with brief rest break b/t ex's ( 1 min). PT answered all questions within scope of practice and updated pt on her progress with PT.  White board updated in pt's room.     Patient left up in chair with all lines intact, call button in reach and nsg, Jackie,  notified..    GOALS:   Multidisciplinary Problems     Physical Therapy Goals        Problem: Physical Therapy Goal    Goal Priority Disciplines Outcome Goal Variances Interventions   Physical Therapy Goal     PT, PT/OT Ongoing (interventions implemented as  appropriate)     Description:  Goals to be met by: 1/4/2019    Patient will increase functional independence with mobility by performing:    Supine <> sit with Stand-by Assistance.- met  Sit <> stand transfer with Contact Guard Assistance using Rolling Walker.- Met   New goal: Sit to stand transfer with (S) using RW  Bed <> chair transfer via Stand Pivot with Contact Guard Assistance using Rolling Walker.- not met  Gait  x 100 feet with Minimal Assistance using Rolling Walker to prepare for community ambulation and endurance activities.- not met  Able to tolerate exercise for 15-20 reps with assistance as needed.- Met                           Time Tracking:     PT Received On: 12/31/18  PT Start Time: 0843     PT Stop Time: 0910  PT Total Time (min): 27 min     Billable Minutes: Gait Training 10 and Therapeutic Exercise 16    Treatment Type: Treatment  PT/PTA: PT     PTA Visit Number: 0     Anette Pimentel, PT  12/31/2018

## 2018-12-31 NOTE — PROGRESS NOTES
Subjective:       Patient ID: Selma Rosado MD Jose J is a 83 y.o. female.    Chief Complaint: Establish Care    Last seen by previous PCP here two months ago. Unclear whey she is considering transfer of care. Complicated medical history which is beyond the scope of this 30 minute visit. She has no acute complaints. Followed by numerous specialists all seen within the year, the vast majority of her needs have been met.     PMH:   Hypertension.   Paroxysmal Atrial Fibrillation.  Aortic Atherosclerosis.   H/O Stroke.   Vascular Dementia.  H/O Bilateral Pneumonia.  Oropharyngeal Dysphagia.  Rheumatoid Arthritis, long term steroid use.  Chronic Low Back Pain.  Cervical Dystonia.  PUD.   IBS.   Fatty Liver.   Uterine Fibroids.   Iron deficiency Anemia.   Vitamin D deficiency.    PSH:   No date: CATARACT EXTRACTION  9/29/2015: COLONOSCOPY; N/A      Comment:  Performed by FIDELINA Sanchez MD at [facility] ENDO (4TH FLR)  3/11/2014: EGD (ESOPHAGOGASTRODUODENOSCOPY); N/A      Comment:  Performed by Federico Escobar MD at [facility] ENDO (4TH FLR)  11/5/2013: EGD (ESOPHAGOGASTRODUODENOSCOPY); N/A      Comment:  Performed by Federico Escobar MD at [facility] ENDO (4TH FLR)  10/4/2017: ESOPHAGOGASTRODUODENOSCOPY (EGD); N/A      Comment:  Performed by Mg Morton MD at [facility]  No date: EYE SURGERY  9/20/2017: INJECTION-STEROID-EPIDURAL-TRANSFORAMINAL; Right      Comment:  Performed by Joselin Valdez MD at [facility], [place], operated by [facility] MGT  No date: JOINT REPLACEMENT      Comment:  right knee  12/31/2013: KNEE SURGERY; Left      Comment:  TKR  No date: left parotidectomy      Comment:  mixed tumor  No date: SALIVARY GLAND SURGERY  No date: TONSILLECTOMY    Mammogram normal 10/17. Pap normal 9/14, Pelvic exam 8/18. BMD 1/15 - Osteopenia. EGD 10/17. Colonoscopy 9/15 - rep 5 yrs. PFT's 10/18. Echocardiogram 10/18 - concentric hypertrophy, severe LAE. Event Monitor 7/18. Carotid ultrasound 1/18. Flu shot 3/18, Prevnar 3/18, Tdap 3/18. Brain MRI scheduled today. Recent  "labs reviewed. A1c 5.5%, LDL 78 in 2017.  Neurology 11/18.  Pulmonology 11/18.  Dermatology 11/18.  Rheumatology 11/18.  Physical Medicine 11/18.  Orthopedics 10/18.  Infectious Disease 10/18.  Cardiology 10/18.  Optometry 9/18.  Gynecology 8/18.  Speech Therapy 5/18.  Psychiatry 3/18.  Heme/Onc 2/18.  Pain Management 1/18.   Gastroenterology 1/18.     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     FMH: HTN, Heart dis, Prostate cancer, Breast cancer.    NKDA.    Medications: list reviewed and reconciled.             Review of Systems   Constitutional: Positive for fatigue. Negative for activity change, appetite change, fever and unexpected weight change.   HENT: Negative for congestion, hearing loss, rhinorrhea, sneezing, sore throat, trouble swallowing and voice change.    Eyes: Negative for pain and visual disturbance.   Respiratory: Positive for shortness of breath. Negative for cough, chest tightness and wheezing.    Cardiovascular: Positive for leg swelling. Negative for chest pain and palpitations.   Gastrointestinal: Negative for abdominal pain, blood in stool, constipation, diarrhea, nausea and vomiting.        Bowel irregularity.   Genitourinary: Negative for difficulty urinating, dysuria, flank pain, frequency, hematuria and urgency.   Musculoskeletal: Positive for arthralgias and back pain. Negative for joint swelling, myalgias and neck pain.   Skin: Negative for color change and rash.   Neurological: Positive for headaches. Negative for dizziness, syncope, facial asymmetry, speech difficulty, weakness and numbness.   Hematological: Negative for adenopathy. Does not bruise/bleed easily.   Psychiatric/Behavioral: Negative for agitation, dysphoric mood and sleep disturbance. The patient is not nervous/anxious.        Objective:    /90, Pulse 77, Ht 5' 2", Wt 170 lbs (stable), BMI=31  Physical Exam   Constitutional: She is oriented to person, place, and time. She appears well-developed and " well-nourished.   HENT:   Nose: Nose normal.   Mouth/Throat: Oropharynx is clear and moist.   Eyes: EOM are normal. No scleral icterus.   Neck: Normal range of motion. Neck supple. No JVD present.   Cardiovascular: Normal rate, regular rhythm and normal heart sounds.   Pulmonary/Chest: Effort normal and breath sounds normal. No respiratory distress. She has no wheezes. She has no rales.   Abdominal: Soft. Bowel sounds are normal. She exhibits no distension. There is no tenderness.   Musculoskeletal: Normal range of motion. She exhibits no edema.   Lymphadenopathy:     She has no cervical adenopathy.   Neurological: She is alert and oriented to person, place, and time. No cranial nerve deficit. Coordination normal.   Skin: Skin is warm and dry. No rash noted.   Psychiatric: She has a normal mood and affect. Her behavior is normal.       Assessment:       1. Hypertensive heart disease without heart failure    2. Elevated random blood glucose level    3. Long term (current) use of systemic steroids    4. Encounter for screening mammogram for breast cancer    5. Osteoporosis without pathological fracture        Plan:       Hypertensive heart disease without heart failure  -     Renal function panel; Future; Expected date: 11/29/2018  -     Lipid panel; Future; Expected date: 11/29/2018    Elevated random blood glucose level  -     Hemoglobin A1c; Future; Expected date: 11/29/2018    Long term (current) use of systemic steroids    Encounter for screening mammogram for breast cancer  -     Mammo Digital Screening Bilat; Future; Expected date: 11/29/2018    Osteoporosis without pathological fracture  -     DXA Bone Density Spine And Hip; Future; Expected date: 11/29/2018  -     Vitamin D; Future; Expected date: 11/29/2018    F/U with specialists as scheduled.

## 2018-12-31 NOTE — PLAN OF CARE
Problem: Adult Inpatient Plan of Care  Goal: Plan of Care Review    Recommendations     Recommendation/Intervention:   1.Recommend adding renal restrictions to diet.   2.Encourage po intake > 50%.         1.Provide Novasource if PO intake < 50%.   2.RD to follow  Goals: Meet % EEN, EPN  Nutrition Goal Status: progressing towards goal  Communication of RD Recs: reviewed with RN

## 2018-12-31 NOTE — PLAN OF CARE
Problem: Physical Therapy Goal  Goal: Physical Therapy Goal  Goals to be met by: 1/4/2019    Patient will increase functional independence with mobility by performing:    Supine <> sit with Stand-by Assistance.- met  Sit <> stand transfer with Contact Guard Assistance using Rolling Walker.- Met   New goal: Sit to stand transfer with (S) using RW  Bed <> chair transfer via Stand Pivot with Contact Guard Assistance using Rolling Walker.- not met  Gait  x 100 feet with Minimal Assistance using Rolling Walker to prepare for community ambulation and endurance activities.- not met  Able to tolerate exercise for 15-20 reps with assistance as needed.- Met         Outcome: Ongoing (interventions implemented as appropriate)  Pt met transfer and bed mob goal, New goal set for transfers.  Cont with current POC.

## 2018-12-31 NOTE — PLAN OF CARE
EBONIE sent orders to [facility], waiting on info to call report.   Pt will go to room 233. The nurse can call report in 45min to [phone]  EBONIE scheduled transport and called informed nurse

## 2018-12-31 NOTE — PLAN OF CARE
[facility]     Department of Hospital Medicine     [address] [place] [address]     [phone] after hours  [phone] fax                                        FACILITY TRANSFER ORDERS     Patient Name: Selma Alonzo Lux MD  YOB: [birth year]    Admit to:  Inpatient Rehab     Diagnoses:  Active Hospital Problems    Diagnosis  POA    *Acute encephalopathy [G93.40]  Yes    UTI (urinary tract infection) [N39.0]  No    Poor nutrition [E63.9]  Yes    Delirium superimposed on dementia [F05]  Yes    Hypertensive encephalopathy [I67.4]  Yes    Altered mental status [R41.82]  Yes    Thrombocytopenia [D69.6]  Yes    Long-term use of immunosuppressant medication [Z79.899]  Not Applicable    Dystonia [G24.9]  Yes    Acute renal failure superimposed on stage 3 chronic kidney disease [N17.9, N18.3]  No    History of stroke [Z86.73]  Not Applicable    Vascular dementia [F01.50]  Yes    Hypertension, essential [I10]  Yes    Seropositive rheumatoid arthritis of multiple sites [M05.79]  Yes     Dx 2012 with Dr No, rafal Qureshi  HCQ since 2012  Prednisone 5 mg/d since 2012  Humira one dose April 2018 followed by ICU admission for pnemonia and resp failure        Resolved Hospital Problems   No resolved problems to display.       Vital Signs: Routine.    Allergies:Review of patient's allergies indicates:  No Known Allergies    Diet: Dental Soft with thin liquids.      Acitivities: activity as tolerated    Nursing:   Routine Pressure ulcer prevention, Turn Q2   Delirium Precautions per routine.    Notify MD for Temperature Greater than or equal to 100.4   Systolic BP > 180 or < 90    Diastolic BP > 90 or < 60   Pulse > 110 or < 40    RR > 25 or < 8    SPO2 < 89    Labs: BMP, CBC weekly x 2 weeks     CONSULTS:       Physical Therapy to evaluate and treat     Occupational Therapy to evaluate and treat     Speech Therapy  to evaluate and  treat    MISCELLANEOUS CARE:         DIABETES CARE:      Fingerstick blood sugar a.m. while on Steroids                                                Insulin Sliding Scale          Glucose  Novolog Insulin Subcutaneous        0 - 60   Orange juice or glucose tablet, hold insulin      No insulin   201-250  2 units   251-300  4 units   301-350  6 units   351-400  8 units   >400   10 units then call physician    Medications:      DR.Bacon Alonzo Lux MD   Home Medication Instructions JAZZMINE:[id number]    Printed on:12/31/18 1077   Medication Information                      acetaminophen (TYLENOL) 500 MG tablet  Take 1,000 mg by mouth daily as needed for Pain.             aspirin (ECOTRIN) 81 MG EC tablet  Take 81 mg by mouth once daily.             carvedilol (COREG) 12.5 MG tablet  Take 1 tablet (12.5 mg total) by mouth 2 (two) times daily with meals.             ciclopirox (PENLAC) 8 % Soln  Apply to affected nails qhs. Clean nails with alcohol q7 days.             clopidogrel (PLAVIX) 75 mg tablet  Take 75 mg by mouth once daily.             diazePAM (VALIUM) 2 MG tablet  Take 1 tablet (2 mg total) by mouth as needed for Anxiety.             furosemide (LASIX) 20 MG tablet  Take 1 tablet (20 mg total) by mouth once daily.             gabapentin (NEURONTIN) 300 MG capsule  Take 1 capsule (300 mg total) by mouth every evening.             hydroxychloroquine (PLAQUENIL) 200 mg tablet  Take 2 tablets (400 mg total) by mouth once daily.             magnesium oxide (MAG-OX) 400 mg tablet  Take 400 mg by mouth once daily.             montelukast (SINGULAIR) 10 mg tablet  Take 1 tablet (10 mg total) by mouth every evening.             omeprazole (PRILOSEC) 20 MG capsule  TAKE 1 CAPSULE DAILY             potassium chloride SA (K-DUR,KLOR-CON) 10 MEQ tablet  Take 2 tablets (20 mEq total) by mouth once daily.             predniSONE (DELTASONE) 10 MG tablet  Take 2 tablets (20 mg total) by mouth once daily for 4 days,  THEN 1.5 tablets (15 mg total) once daily for 5 days, THEN 1 tablet (10 mg total) once daily for 5 days.             predniSONE (DELTASONE) 5 MG tablet  Take 1 tablet (5 mg total) by mouth once daily. Resume 5 mg on 1/15/19 after you complete with the taper             sulfaSALAzine (AZULFIDINE) 500 MG TbEC  Take 2 tablets (1,000 mg total) by mouth 2 (two) times daily.                 Digitally signed 12/31/2018  _________________________________  Marques Tiwari MD  12/31/2018

## 2018-12-31 NOTE — PLAN OF CARE
Patient has been accepted to [facility]     12/31/18 4219   Final Note   Assessment Type Final Discharge Note   Anticipated Discharge Disposition Rehab

## 2018-12-31 NOTE — PLAN OF CARE
12/31/18 0909   Post-Acute Status   Post-Acute Authorization Placement  (waiting on choices)   Post-Acute Placement Status Patient List Provided

## 2018-12-31 NOTE — DISCHARGE SUMMARY
[facility] Hospital Medicine  Discharge Summary      Patient Name: Selma Alonzo Lux MD  MRN: [id number]  Admission Date: 12/16/2018  Hospital Length of Stay: 12 days  Discharge Date and Time:  12/31/2018 4:54 PM  Attending Physician: Zoya att. providers found   Discharging Provider: Alexa Saldaña MD  Primary Care Provider: Bhargav Hirsch MD  [facility] Medicine Team: [facility] HOSP MED 3 Alexa Saldaña MD    HPI:   Ms. Hahn Alonzo Lux MD is a 83 y.o. female with rheumatoid arthritis on steroids, Plaquenil and Sulfasalazine, history of stroke on Aspirin/Plavix, and vascular dementia who presents to the emergency department with her daughter because of confusion.  History is obtained from the daughter, as the patient is not able to provide a good history.  The daughter mentions that at baseline, the patient is very independent and is very sharp.  However, the day prior to admission the patient was endorsing some blurred vision, having difficulty eating saying that she could not see her plate, and was having unusual behavior-like sitting on her bedroom floor, claiming that someone moved her bed causing her to fall.  She has been having tangential speech requiring frequent redirection.  Additionally, she was complaining of some right-sided leg pain, which is unusual, as she normally complaints of left-sided leg pain.  The patient denies any numbness or tingling in any of her extremities.  The patient was just started sulfasalazine yesterday, to be additional therapy for her rheumatoid arthritis.  Of note, the patient ran out of her prednisone 2 days prior to admission.  She has been taking 25 mg daily, and took her dose this morning.     The in the emergency department, she was found to have a blood pressure of 220 on arrival.  Daughter reports that her baseline blood pressure runs low, 110-130.  She received 2 doses of Labetalol 20mg IV x 2 with improvement in her blood pressure to the 180s.  Head CT  and MRI were negative for any acute process.  Labs were notable for a trop 0.025.  She was admitted to Hospital Medicine for further management.    * No surgery found *      Hospital Course:   Patient was initially admitted to hospital medicine with confusion and hypertensive emergency. Neurology and Psychiatry were both consulted. Psychiatry had attributed altered mental status to a medical etiology. Neurology had considered hypertensive encephalopathy along with an infectious etiology and continued to follow patient throughout most of hospital stay. Patient was stepped up to the ICU on hospital day 4 (12/19) for somnolence and fever. Acute encephalopathy with etiology was unclear but likely due to viral encephalitis as patient had an IR guided LP  on 12/24/18 that showed cell count CSF not consistent with bacterial meningitis, so she was continue d on acyclovir but  other antibiotics such as vancomycin and cefipime were discontinued. She remained afebrile with stable WBC stable. Stress dose steroids were started, which were tapered down. Other workup included EEG, which was repeated twice with no signs of seizure activity. MRI was not consistent with PRESS, or meningeal enhancement .  She had developed acute on chronic renal failure with Cr improving throughout hospital course. Her immunosuppressive medications for RA were held for concern of infectious etiology as cause of acute encephalopathy, and steroid taper was started.      On hospital day 12 (12/27), she was stepped down to the hospital floor due to improved mental status and blood pressure, and acyclovir was continued. UA was suggestive of UTI, and she was given 2 doses of ceftriaxone. Bactrim was discontinued the next day as patient no longer was on high dose steroids with resumption of home Lasix 40mg BID and RA medications. Neurology had signed off as there was no further workup to be done. HSV PCR was negative, and acyclovir was discontinued. Patient  was alert and orientated to person, place and time on day of admission. PT/OT evaluated patient and recommended skilled nursing facility. She is clinically and medically stable for discharge to [facility] facility. Steroid taper as well as other home medications will be continued as indicated in plan of care note dated 12/31.      Consults:   Consults (From admission, onward)        Status Ordering Provider     Inpatient consult to Critical Care Medicine  Once     Provider:  (Not yet assigned)    Completed CHRIS BATISTA     Inpatient consult to Midline team  Once     Provider:  (Not yet assigned)    Completed CORY VILLAFUERTE     Inpatient consult to Neurology  Once     Provider:  (Not yet assigned)    Completed SHANA ELENA JR     Inpatient consult to Psychiatry  Once     Provider:  (Not yet assigned)    Completed WENDY TAPIA     Inpatient consult to Registered Dietitian/Nutritionist  Once     Provider:  (Not yet assigned)    Completed ROULA VAIL     Inpatient consult to Registered Dietitian/Nutritionist  Once     Provider:  (Not yet assigned)    Completed PAULINA JAMES        Review of Systems  Constitutional: Positive for fatigue. Negative for fever.   Cardiovascular: Negative for chest pain, leg swelling  Respiratory: Negative for shortness of breath, cough, wheezing.  Gastrointestinal: Negative for nausea and vomiting.   Endocrine: Negative for polyuria, polydispia.    Genitourinary: Negative for abdominal pain, nausea, vomiting.    Musculoskeletal: Negative for joint pain, edema  Skin: Negative for pallor, rash.    Neurological: Positive for weakness. Negative for headaches, dizziness.  Psychiatric/Behavioral: Negative for agitation and confusion.     Physical Exam   Constitutional: She is oriented to person, place, and time.   HENT:   Head: Normocephalic and atraumatic.   Mouth/Throat: Oropharynx is clear and moist. No oropharyngeal exudate.   Eyes: EOM are normal. Pupils  are equal, round, and reactive to light. Right eye exhibits no discharge. Left eye exhibits no discharge. No scleral icterus.   Neck: Normal range of motion. Neck supple. No JVD present. No tracheal deviation present.   Cardiovascular: Normal rate, regular rhythm and normal heart sounds. Exam reveals no gallop and no friction rub.   No murmur heard.  Pulmonary/Chest: Effort normal and breath sounds normal. No stridor. No respiratory distress. She has no wheezes.   Abdominal: Soft. She exhibits no distension and no mass. There is no tenderness. There is no guarding.   Musculoskeletal: Normal range of motion. She exhibits no edema or deformity.   Neurological: She is alert and oriented to person, place, and time. She displays normal reflexes. She exhibits abnormal muscle tone. Coordination normal.   Oriented to time and person, and place, Follows commands, weakness improving   Skin: Skin is warm and dry. Capillary refill takes less than 2 seconds.     Final Active Diagnoses:    Diagnosis Date Noted POA    PRINCIPAL PROBLEM:  Acute encephalopathy [G93.40] 12/19/2018 Yes    UTI (urinary tract infection) [N39.0] 12/29/2018 No    Poor nutrition [E63.9] 12/23/2018 Yes    Delirium superimposed on dementia [F05]  Yes    Hypertensive encephalopathy [I67.4]  Yes    Altered mental status [R41.82]  Yes    Thrombocytopenia [D69.6] 12/16/2018 Yes    Long-term use of immunosuppressant medication [Z79.899] 12/16/2018 Not Applicable    Dystonia [G24.9] 12/16/2018 Yes    Acute renal failure superimposed on stage 3 chronic kidney disease [N17.9, N18.3] 04/15/2018 No    History of stroke [Z86.73] 12/12/2017 Not Applicable    Vascular dementia [F01.50] 12/06/2017 Yes    Hypertension, essential [I10] 06/16/2016 Yes    Seropositive rheumatoid arthritis of multiple sites [M05.79] 10/21/2015 Yes      Problems Resolved During this Admission:       Discharged Condition: good    Disposition: Rehab Facility    Follow Up:  Follow-up  Information     Lonnie Rouse MD. Schedule an appointment as soon as possible for a visit in 2 weeks.    Specialty:  Rheumatology  Why:  Rheum follow up regarding steroid taper.    Contact information:  [address]ique GARCIA  [facility] [address] [phone]                 Patient Instructions:      Ambulatory referral to Outpatient Case Management   Referral Priority: Routine Referral Type: Consultation   Referral Reason: Specialty Services Required   Number of Visits Requested: 1       Pending Diagnostic Studies:     Procedure Component Value Units Date/Time    Ammonia [[id number]] Collected:  12/19/18 1813    Order Status:  Sent Lab Status:  In process Updated:  12/19/18 1824    Specimen:  Blood     Freeze and Hold, [facility] [[id number]] Collected:  12/24/18 1640    Order Status:  Sent Lab Status:  No result     Specimen:  CSF (Spinal Fluid) from Cerebrospinal Fluid          Medications:  Reconciled Home Medications:      Medication List      CHANGE how you take these medications    diazePAM 2 MG tablet  Commonly known as:  VALIUM  Take 1 tablet (2 mg total) by mouth as needed for Anxiety.  What changed:  when to take this     furosemide 20 MG tablet  Commonly known as:  LASIX  Take 1 tablet (20 mg total) by mouth once daily.  What changed:  when to take this     gabapentin 300 MG capsule  Commonly known as:  NEURONTIN  Take 1 capsule (300 mg total) by mouth every evening.  What changed:  when to take this     * predniSONE 10 MG tablet  Commonly known as:  DELTASONE  Take 2 tablets (20 mg total) by mouth once daily for 4 days, THEN 1.5 tablets (15 mg total) once daily for 5 days, THEN 1 tablet (10 mg total) once daily for 5 days.  Start taking on:  1/1/2019  What changed:  You were already taking a medication with the same name, and this prescription was added. Make sure you understand how and when to take each.     * predniSONE 5 MG tablet  Commonly known as:  DELTASONE  Take 1 tablet (5 mg total) by mouth once  daily. Resume 5 mg on 1/15/19 after you complete with the taper  Start taking on:  1/15/2019  What changed:    · additional instructions  · These instructions start on 1/15/2019. If you are unsure what to do until then, ask your doctor or other care provider.         * This list has 2 medication(s) that are the same as other medications prescribed for you. Read the directions carefully, and ask your doctor or other care provider to review them with you.            CONTINUE taking these medications    acetaminophen 500 MG tablet  Commonly known as:  TYLENOL  Take 1,000 mg by mouth daily as needed for Pain.     aspirin 81 MG EC tablet  Commonly known as:  ECOTRIN  Take 81 mg by mouth once daily.     carvedilol 12.5 MG tablet  Commonly known as:  COREG  Take 1 tablet (12.5 mg total) by mouth 2 (two) times daily with meals.     ciclopirox 8 % Soln  Commonly known as:  PENLAC  Apply to affected nails qhs. Clean nails with alcohol q7 days.     clopidogrel 75 mg tablet  Commonly known as:  PLAVIX  Take 75 mg by mouth once daily.     hydroxychloroquine 200 mg tablet  Commonly known as:  PLAQUENIL  Take 2 tablets (400 mg total) by mouth once daily.     magnesium oxide 400 mg (241.3 mg magnesium) tablet  Commonly known as:  MAG-OX  Take 400 mg by mouth once daily.     montelukast 10 mg tablet  Commonly known as:  SINGULAIR  Take 1 tablet (10 mg total) by mouth every evening.     omeprazole 20 MG capsule  Commonly known as:  PRILOSEC  TAKE 1 CAPSULE DAILY     potassium chloride SA 10 MEQ tablet  Commonly known as:  K-DUR,KLOR-CON  Take 2 tablets (20 mEq total) by mouth once daily.     sulfaSALAzine 500 MG Tbec  Commonly known as:  AZULFIDINE  Take 2 tablets (1,000 mg total) by mouth 2 (two) times daily.        STOP taking these medications    baclofen 10 MG tablet  Commonly known as:  LIORESAL            Time spent on the discharge of patient: 45 minutes  Patient was seen and examined on the date of discharge and determined to  be suitable for discharge.      Alexa Saldaña MD PGY1  Department of Hospital Medicine  [facility]

## 2018-12-31 NOTE — NURSING
Patient is discharged to [facility].  Got patient dressed, pulled IV without complications and Cinthai is here to transport patient to [facility].  Called report to [facility].  Patient is taken via wheelchair to transport to Rehab.

## 2018-12-31 NOTE — PLAN OF CARE
Rounded with IM3. Patient preference for post acute care is [facility]. CM updated SW Nehemias. Referral sent.

## 2018-12-31 NOTE — PROGRESS NOTES
"[facility]  Adult Nutrition  Progress Note    SUMMARY       Recommendations    Recommendation/Intervention:   1.Recommend adding renal restrictions to diet.   2.Encourage po intake > 50%.       1.Provide Novasource if PO intake < 50%.   2.RD to follow  Goals: Meet % EEN, EPN  Nutrition Goal Status: progressing towards goal  Communication of RD Recs: reviewed with RN    Reason for Assessment    Reason For Assessment: RD follow-up  Diagnosis: other (see comments)(Encephalopathy)  Relevant Medical History: HTN, Dementia  Interdisciplinary Rounds: attended  General Information Comments: PT alert at time of visit. pt states decreased appetite d/t food being to salty for her taste. NFPE  found no signs of malnutrition. RD currently agrees  Nutrition Discharge Planning: Unable to determine    Nutrition Risk Screen    Nutrition Risk Screen: no indicators present    Nutrition/Diet History    Patient Reported Diet/Restrictions/Preferences: other (see comments)(KERRY)  Spiritual, Cultural Beliefs, Adventist Practices, Values that Affect Care: no  Factors Affecting Nutritional Intake: altered taste    Anthropometrics    Temp: 97.8 °F (36.6 °C)  Height Method: Stated  Height: 5' 3" (160 cm)  Height (inches): 63 in  Weight Method: Bed Scale  Weight: 72.4 kg (159 lb 9.8 oz)  Weight (lb): 159.61 lb  Ideal Body Weight (IBW), Female: 115 lb  % Ideal Body Weight, Female (lb): 138.79 lb  BMI (Calculated): 28.3  BMI Grade: 25 - 29.9 - overweight  Usual Body Weight (UBW), k.5 kg  % Usual Body Weight: 101.47  % Weight Change From Usual Weight: 1.26 %       Lab/Procedures/Meds    Pertinent Labs Reviewed: reviewed  Pertinent Labs Comments: BUN-25, GFR-49, Glu-62, BNP-302  Pertinent Medications Reviewed: reviewed  Pertinent Medications Comments: lasix, heparin, zofran, prednisone    Physical Findings/Assessment         Estimated/Assessed Needs    Weight Used For Calorie Calculations: 72.4 kg (159 lb 9.8 " oz)  Energy Calorie Requirements (kcal): 1448 kcal/d  Energy Need Method: [place]-St Jeor(1.25 PAL)  Protein Requirements: 72-87 g/d (1-1.2 g/kg)  Weight Used For Protein Calculations: 72.4 kg (159 lb 9.8 oz)     Estimated Fluid Requirement Method: other (see comments)(Per MD or 1 mL/kcal)  RDA Method (mL): 1448  CHO Requirement: 50% total kcals      Nutrition Prescription Ordered    Current Diet Order: Mech Soft  Current Nutrition Support Formula Ordered: Novasource Renal  Current Nutrition Support Rate Ordered: 30 (ml)  Current Nutrition Support Frequency Ordered: mL/hr    Evaluation of Received Nutrient/Fluid Intake    Enteral Calories (kcal): 0  Enteral Protein (gm): 0  Enteral (Free Water) Fluid (mL): 0  Oral Calories (kcal): 475  Oral Protein (gm): 14  Oral Fluid (mL): 237  % Kcal Needs: 99%  % Protein Needs: 90%  Energy Calories Required: meeting needs  Protein Required: meeting needs  Fluid Required: other (see comments)(Per MD or 1 mL/kcal)  Comments: LBM: 12/13/18  Tolerance: tolerating  % Intake of Estimated Energy Needs: 25 - 50 %  % Meal Intake: 25 - 50 %    Nutrition Risk    Level of Risk/Frequency of Follow-up: low     Assessment and Plan    .Nutrition Problem  Inadequate oral intake    Related to (etiology):   Altered taste- pt find food to be to salty for her taste    Signs and Symptoms (as evidenced by):   PO intake <50%  Denies NVDC, abdominal pain    Interventions/Recommendations (treatment strategy):  1.Recommend adding renal restrictions to diet.   2.Encourage po intake > 50%.       1.Provide Novasource if PO intake < 50%.   2.RD to follow    Nutrition Diagnosis Status:   New        Monitor and Evaluation    Food and Nutrient Intake: energy intake, food and beverage intake, enteral nutrition intake  Food and Nutrient Adminstration: diet order, enteral and parenteral nutrition administration  Physical Activity and Function: nutrition-related ADLs and IADLs  Anthropometric Measurements: weight,  weight change  Biochemical Data, Medical Tests and Procedures: lipid profile, glucose/endocrine profile, electrolyte and renal panel, gastrointestinal profile, inflammatory profile  Nutrition-Focused Physical Findings: overall appearance     Malnutrition Assessment  Malnutrition Type: (criteria not met)         Nutrition Follow-Up    RD Follow-up?: Yes

## 2018-12-31 NOTE — PATIENT CARE CONFERENCE
EBONIE sent referrals to [facility]  [facility] and [facility].     Pt accepted by [facility], EBONIE to work on obtaining orders

## 2018-12-31 NOTE — PLAN OF CARE
EBONIE sent orders to [facility], waiting on info to call report.   Pt will go to room 233. The nurse can call report in 45min to [phone]

## 2019-01-02 ENCOUNTER — OUTPATIENT CASE MANAGEMENT (OUTPATIENT)
Dept: ADMINISTRATIVE | Facility: OTHER | Age: 84
End: 2019-01-02

## 2019-01-03 NOTE — PT/OT/SLP DISCHARGE
Physical Therapy Discharge Summary    Name: Selma Alonzo Lux MD  MRN: [id number]   Principal Problem: Acute encephalopathy     Patient Discharged from acute Physical Therapy on 12/31/18.  Please refer to prior PT noted date on 12/31/18 for functional status.     Assessment:     Goals partially met.Due to d/c to Rehab.    Objective:     GOALS:   Multidisciplinary Problems     Physical Therapy Goals     Not on file          Multidisciplinary Problems (Resolved)        Problem: Physical Therapy Goal    Goal Priority Disciplines Outcome Goal Variances Interventions   Physical Therapy Goal   (Resolved)     PT, PT/OT Outcome(s) achieved     Description:  Goals to be met by: 1/4/2019    Patient will increase functional independence with mobility by performing:    Supine <> sit with Stand-by Assistance.- met  Sit <> stand transfer with Contact Guard Assistance using Rolling Walker.- Met   New goal: Sit to stand transfer with (S) using RW  Bed <> chair transfer via Stand Pivot with Contact Guard Assistance using Rolling Walker.- not met  Gait  x 100 feet with Minimal Assistance using Rolling Walker to prepare for community ambulation and endurance activities.- not met  Able to tolerate exercise for 15-20 reps with assistance as needed.- Met                           Reasons for Discontinuation of Therapy Services  Transfer to alternate level of care.      Plan:     Patient Discharged to: Inpatient Rehab- [facility].    Anette Pimentel, PT  1/3/2019

## 2019-01-03 NOTE — PROGRESS NOTES
Please note that this patient was not enrolled in Outpatient Case Management at this time due to being transferred to a Rehab facility. Please send new referral upon discharge to home.    Please contact [facility] at Ext. [phone] with questions.    Thank you,    Diana Valentin, [facility]  Outpatient Care Mgmt.  [phone]

## 2019-01-11 ENCOUNTER — TELEPHONE (OUTPATIENT)
Dept: INTERNAL MEDICINE | Facility: CLINIC | Age: 84
End: 2019-01-11

## 2019-01-11 NOTE — TELEPHONE ENCOUNTER
----- Message from Vania Stevenson sent at 1/11/2019 10:18 AM CST -----  Contact:   [facility] [phone]  Will you oversee the patient's HH services    Please call and advise  Thank you

## 2019-01-11 NOTE — TELEPHONE ENCOUNTER
Yes, I can. But will need to see her for a face-to-face visit to meet ordering criteria.    Please call patient and schedule patient for an appointment with me. Thank you.

## 2019-01-15 ENCOUNTER — TELEPHONE (OUTPATIENT)
Dept: ADMINISTRATIVE | Facility: CLINIC | Age: 84
End: 2019-01-15

## 2019-01-18 ENCOUNTER — TELEPHONE (OUTPATIENT)
Dept: INTERNAL MEDICINE | Facility: CLINIC | Age: 84
End: 2019-01-18

## 2019-01-18 NOTE — TELEPHONE ENCOUNTER
Received request from interim  for intake and orders. I have not seen the patient recently and she was recently discharged. I asked if we can make hear an appointment to go over everything and do face to face to meet the requirements to sign off the HH orders and she agreed.

## 2019-01-24 ENCOUNTER — TELEPHONE (OUTPATIENT)
Dept: INTERNAL MEDICINE | Facility: CLINIC | Age: 84
End: 2019-01-24

## 2019-01-24 ENCOUNTER — OFFICE VISIT (OUTPATIENT)
Dept: INTERNAL MEDICINE | Facility: CLINIC | Age: 84
End: 2019-01-24
Attending: FAMILY MEDICINE
Payer: MEDICARE

## 2019-01-24 VITALS
SYSTOLIC BLOOD PRESSURE: 120 MMHG | HEIGHT: 63 IN | DIASTOLIC BLOOD PRESSURE: 64 MMHG | HEART RATE: 68 BPM | BODY MASS INDEX: 28.27 KG/M2 | OXYGEN SATURATION: 97 %

## 2019-01-24 DIAGNOSIS — J84.116 CRYPTOGENIC ORGANIZING PNEUMONIA: ICD-10-CM

## 2019-01-24 DIAGNOSIS — F01.50 VASCULAR DEMENTIA WITHOUT BEHAVIORAL DISTURBANCE: ICD-10-CM

## 2019-01-24 DIAGNOSIS — I10 HYPERTENSION, ESSENTIAL: ICD-10-CM

## 2019-01-24 DIAGNOSIS — Z12.31 ENCOUNTER FOR SCREENING MAMMOGRAM FOR MALIGNANT NEOPLASM OF BREAST: ICD-10-CM

## 2019-01-24 DIAGNOSIS — E04.1 THYROID NODULE: ICD-10-CM

## 2019-01-24 DIAGNOSIS — Z86.73 HISTORY OF STROKE: ICD-10-CM

## 2019-01-24 DIAGNOSIS — I48.0 AF (PAROXYSMAL ATRIAL FIBRILLATION): ICD-10-CM

## 2019-01-24 DIAGNOSIS — H93.19 TINNITUS, UNSPECIFIED LATERALITY: Primary | ICD-10-CM

## 2019-01-24 DIAGNOSIS — M05.79 SEROPOSITIVE RHEUMATOID ARTHRITIS OF MULTIPLE SITES: ICD-10-CM

## 2019-01-24 DIAGNOSIS — G93.40 ACUTE ENCEPHALOPATHY: ICD-10-CM

## 2019-01-24 PROBLEM — N39.0 UTI (URINARY TRACT INFECTION): Status: RESOLVED | Noted: 2018-12-29 | Resolved: 2019-01-24

## 2019-01-24 PROCEDURE — 99496 TRANSJ CARE MGMT HIGH F2F 7D: CPT | Mod: PBBFAC | Performed by: FAMILY MEDICINE

## 2019-01-24 PROCEDURE — 99214 PR OFFICE/OUTPT VISIT, EST, LEVL IV, 30-39 MIN: ICD-10-PCS | Mod: S$PBB,,, | Performed by: FAMILY MEDICINE

## 2019-01-24 PROCEDURE — 99999 PR PBB SHADOW E&M-EST. PATIENT-LVL V: CPT | Mod: PBBFAC,,, | Performed by: FAMILY MEDICINE

## 2019-01-24 PROCEDURE — 99215 OFFICE O/P EST HI 40 MIN: CPT | Mod: PBBFAC,25 | Performed by: FAMILY MEDICINE

## 2019-01-24 PROCEDURE — 99214 OFFICE O/P EST MOD 30 MIN: CPT | Mod: S$PBB,,, | Performed by: FAMILY MEDICINE

## 2019-01-24 PROCEDURE — 99999 PR PBB SHADOW E&M-EST. PATIENT-LVL V: ICD-10-PCS | Mod: PBBFAC,,, | Performed by: FAMILY MEDICINE

## 2019-01-24 RX ORDER — TRAMADOL HYDROCHLORIDE 50 MG/1
50-100 TABLET ORAL
Qty: 40 TABLET | Refills: 0 | Status: SHIPPED | OUTPATIENT
Start: 2019-01-24 | End: 2019-06-04

## 2019-01-24 NOTE — PROGRESS NOTES
Subjective:       Patient ID: Selma Rosado MD Jose J is a 83 y.o. female.    Chief Complaint: Hospital Follow Up    HPI  Review of Systems   Constitutional: Positive for fatigue. Negative for chills, fever and unexpected weight change.   HENT: Positive for tinnitus. Negative for congestion and trouble swallowing.    Eyes: Negative for redness and visual disturbance.   Respiratory: Negative for cough, chest tightness and shortness of breath.    Cardiovascular: Negative for chest pain, palpitations and leg swelling.   Gastrointestinal: Negative for abdominal pain and blood in stool.   Genitourinary: Negative for difficulty urinating, hematuria and menstrual problem.   Musculoskeletal: Positive for gait problem, neck pain and neck stiffness. Negative for arthralgias, back pain, joint swelling and myalgias.   Skin: Negative for color change and rash.   Neurological: Positive for tremors and weakness. Negative for speech difficulty, numbness and headaches.   Hematological: Negative for adenopathy. Does not bruise/bleed easily.   Psychiatric/Behavioral: Negative for behavioral problems, confusion and sleep disturbance. The patient is not nervous/anxious.        Objective:      Physical Exam   Constitutional: She is oriented to person, place, and time. She appears well-developed and well-nourished. No distress.   HENT:   Head: Normocephalic and atraumatic.   Right Ear: Tympanic membrane and external ear normal. A foreign body is present.   Left Ear: Tympanic membrane, external ear and ear canal normal.   Mouth/Throat: Uvula is midline and mucous membranes are normal.   Neck: Neck supple. Thyromegaly present.   Pulmonary/Chest: Effort normal.   Musculoskeletal: She exhibits no edema.        Right lower leg: She exhibits no edema.        Left lower leg: She exhibits no edema.   Lymphadenopathy:     She has no cervical adenopathy.   Neurological: She is alert and oriented to person, place, and time.   Skin: Skin is warm and  dry. No rash noted.   Psychiatric: She has a normal mood and affect. Her behavior is normal. Judgment and thought content normal.   Nursing note and vitals reviewed.      Assessment:       1. Tinnitus, unspecified laterality    2. Encounter for screening mammogram for malignant neoplasm of breast    3. Acute encephalopathy    4. History of stroke    5. Hypertension, essential    6. Vascular dementia without behavioral disturbance    7. Seropositive rheumatoid arthritis of multiple sites    8. Cryptogenic organizing pneumonia    9. Thyroid nodule    10. AF (paroxysmal atrial fibrillation)        Plan:   Selma was seen today for hospital follow up.    Diagnoses and all orders for this visit:    Tinnitus, unspecified laterality  -     Ambulatory referral to Audiology    Encounter for screening mammogram for malignant neoplasm of breast  -     Mammo Digital Screening Bilat; Future    Acute encephalopathy  -     CBC auto differential; Future  -     Ambulatory Consult to Home Health    History of stroke  -     Ambulatory Consult to Home Health    Hypertension, essential  -     Basic metabolic panel; Future  -     Ambulatory Consult to Home Health    Vascular dementia without behavioral disturbance  -     Ambulatory Consult to Home Health    Seropositive rheumatoid arthritis of multiple sites  -     Ambulatory Consult to Home Health    Cryptogenic organizing pneumonia  -     Ambulatory Consult to Home Health    Thyroid nodule  -     TSH; Future  -     Ambulatory Consult to Home Health    AF (paroxysmal atrial fibrillation)  -     Ambulatory referral to Cardiology  -     Ambulatory Consult to Home Health    Other orders  -     traMADol (ULTRAM) 50 mg tablet; Take 1-2 tablets ( mg total) by mouth every 24 hours as needed for Pain.      See meds, orders, follow up, routing and instructions sections of encounter.  The patient is in at our request for face-to-face visit as we received a request   from an outside home  health agency for orders.  The patient was admitted at   [facility] from December 16 through December 31 and I did review that discharge   summary that was apparently for diagnosis of an acute encephalitis; however, the   Infectious Disease serologies were apparently negative.  Apparently, she had a   recovery.  She also had some ongoing evaluations for  through Dr. Francis and   her rheumatoid arthritis through Dr. Rouse.  She is on a potentially tapering   dose of prednisone; however, will likely need to be on that long-term.  She had   some aggravated pulmonary issues last year that were thought to possibly have   been due to Humira, which she was subsequently discontinued.    The patient had some acute tinnitus in the left ear today that did resolve.  She   has no acute hearing loss.    She has chronic neck pain.  She is seeing Dr. Thomson and Dr. Giang and has   some pending Botox injections.    For the neck pain, she takes two aspirins a day.  She had been on Norco in the   past.  In general, she has requested opioids in the past for pain relief,   although the effectiveness is questionable.    Following her hospitalization discharge, she was at [facility] and she has been at [facility] in the past.  They   stated bed unavailable and dissatisfaction with the [facility].  She   presents with her daughter today who states she is doing a bit better.  She is   using a walker to ambulate in her home.  Prior to the December event, she had   been ambulating with a cane, which is their goal to get back to.    The patient requests a mammogram today.    Looking back at her old records, she had evaluations for what she describes as   Hashimoto thyroiditis with Dr. Rader and other Endocrinology in 2014, 2015   and 2016 time frame.  She had an ultrasound followed by CT.  She states that she   had a surgery pending, but did not want to go through with it and  subsequently   she has had a wealth of issues pop up including multiple hospitalizations with   severe respiratory compromise, neurologic compromise, prolonged rehabilitation,   etc.  She has also had CVAs, multi-infarct dementia, chorea, which is   better at this time.    She is on Plavix.  She did see Dr. Tobias recently.  Scanned note was reviewed.    No recommendation concerning chronicity of Plavix; however, we did discuss this   with one of the cardiologists in the past and they felt probably long-term.    She is currently getting OT and PT twice a week, home health nurse visit once a   week.  Her speech therapist signed off.  She is currently living with her two   daughters.    Two-month followup with intervening consult is placed.    Today's appointment was approximately 1 hour with > 50% spent in counseling, records reviews and planning.    Transitional Care Note    Family and/or Caretaker present at visit?  Yes.  Diagnostic tests reviewed/disposition: I have reviewed all completed as well as pending diagnostic tests at the time of discharge.  Disease/illness education: see problem list  Home health/community services discussion/referrals: Patient has home health established at home.   Establishment or re-establishment of referral orders for community resources: [place] for aging, etc..   Discussion with other health care providers: several appts pending.           SELENA/HN  dd: 01/24/2019 12:30:59 (CST)  td: 01/25/2019 07:31:49 (CST)  Doc ID   #[id number]  Job ID #[id number]    CC:

## 2019-01-29 ENCOUNTER — TELEPHONE (OUTPATIENT)
Dept: INTERNAL MEDICINE | Facility: CLINIC | Age: 84
End: 2019-01-29

## 2019-01-29 ENCOUNTER — OFFICE VISIT (OUTPATIENT)
Dept: ORTHOPEDICS | Facility: CLINIC | Age: 84
End: 2019-01-29
Payer: MEDICARE

## 2019-01-29 VITALS
SYSTOLIC BLOOD PRESSURE: 145 MMHG | HEIGHT: 63 IN | BODY MASS INDEX: 28 KG/M2 | HEART RATE: 88 BPM | DIASTOLIC BLOOD PRESSURE: 73 MMHG | WEIGHT: 158 LBS

## 2019-01-29 DIAGNOSIS — M19.012 OSTEOARTHRITIS OF LEFT SHOULDER, UNSPECIFIED OSTEOARTHRITIS TYPE: Primary | ICD-10-CM

## 2019-01-29 PROCEDURE — 99999 PR PBB SHADOW E&M-EST. PATIENT-LVL IV: ICD-10-PCS | Mod: PBBFAC,,, | Performed by: ORTHOPAEDIC SURGERY

## 2019-01-29 PROCEDURE — 20610 DRAIN/INJ JOINT/BURSA W/O US: CPT | Mod: PBBFAC | Performed by: ORTHOPAEDIC SURGERY

## 2019-01-29 PROCEDURE — 20610 LARGE JOINT ASPIRATION/INJECTION: L GLENOHUMERAL: ICD-10-PCS | Mod: S$PBB,LT,, | Performed by: ORTHOPAEDIC SURGERY

## 2019-01-29 PROCEDURE — 99213 PR OFFICE/OUTPT VISIT, EST, LEVL III, 20-29 MIN: ICD-10-PCS | Mod: 25,S$PBB,, | Performed by: ORTHOPAEDIC SURGERY

## 2019-01-29 PROCEDURE — 99999 PR PBB SHADOW E&M-EST. PATIENT-LVL IV: CPT | Mod: PBBFAC,,, | Performed by: ORTHOPAEDIC SURGERY

## 2019-01-29 PROCEDURE — 99214 OFFICE O/P EST MOD 30 MIN: CPT | Mod: PBBFAC,25 | Performed by: ORTHOPAEDIC SURGERY

## 2019-01-29 PROCEDURE — 99213 OFFICE O/P EST LOW 20 MIN: CPT | Mod: 25,S$PBB,, | Performed by: ORTHOPAEDIC SURGERY

## 2019-01-29 RX ADMIN — TRIAMCINOLONE ACETONIDE 80 MG: 40 INJECTION, SUSPENSION INTRA-ARTICULAR; INTRAMUSCULAR at 11:01

## 2019-01-29 NOTE — TELEPHONE ENCOUNTER
----- Message from Lucinda Smalls sent at 1/29/2019  2:28 PM CST -----  Contact: leny with occupation therapist   [phone]  Calling to let you know that patient did a fall today in the home, lost balance at time of fall but there were no injuries

## 2019-01-30 ENCOUNTER — OFFICE VISIT (OUTPATIENT)
Dept: GASTROENTEROLOGY | Facility: CLINIC | Age: 84
End: 2019-01-30
Payer: MEDICARE

## 2019-01-30 VITALS — DIASTOLIC BLOOD PRESSURE: 74 MMHG | SYSTOLIC BLOOD PRESSURE: 161 MMHG | HEART RATE: 94 BPM

## 2019-01-30 DIAGNOSIS — K58.9 IRRITABLE BOWEL SYNDROME, UNSPECIFIED TYPE: Primary | ICD-10-CM

## 2019-01-30 DIAGNOSIS — R14.0 ABDOMINAL DISTENTION: ICD-10-CM

## 2019-01-30 PROCEDURE — 99213 OFFICE O/P EST LOW 20 MIN: CPT | Mod: PBBFAC,PO | Performed by: INTERNAL MEDICINE

## 2019-01-30 PROCEDURE — 99999 PR PBB SHADOW E&M-EST. PATIENT-LVL III: ICD-10-PCS | Mod: PBBFAC,,, | Performed by: INTERNAL MEDICINE

## 2019-01-30 PROCEDURE — 99214 OFFICE O/P EST MOD 30 MIN: CPT | Mod: S$PBB,,, | Performed by: INTERNAL MEDICINE

## 2019-01-30 PROCEDURE — 99214 PR OFFICE/OUTPT VISIT, EST, LEVL IV, 30-39 MIN: ICD-10-PCS | Mod: S$PBB,,, | Performed by: INTERNAL MEDICINE

## 2019-01-30 PROCEDURE — 99999 PR PBB SHADOW E&M-EST. PATIENT-LVL III: CPT | Mod: PBBFAC,,, | Performed by: INTERNAL MEDICINE

## 2019-01-30 RX ORDER — TRIAMCINOLONE ACETONIDE 40 MG/ML
80 INJECTION, SUSPENSION INTRA-ARTICULAR; INTRAMUSCULAR
Status: DISCONTINUED | OUTPATIENT
Start: 2019-01-29 | End: 2019-01-30 | Stop reason: HOSPADM

## 2019-01-30 NOTE — PROGRESS NOTES
I have personally taken the history and examined this patient. I agree with the resident's note as stated above. Plan for left shoulder injection and [facility] for severe GH arthritis. Pt does not want surgery- she has had a lot of other medical issues ( admitted for pneumonia) she would like conservative treatment.

## 2019-01-30 NOTE — PROCEDURES
Large Joint Aspiration/Injection: L glenohumeral  Date/Time: 1/29/2019 11:47 AM  Performed by: Jessica Thacker MD  Authorized by: Jessica Thacker MD     Consent Done?:  Yes (Verbal)  Indications:  Pain  Timeout: Prior to procedure the correct patient, procedure, and site was verified      Location:  Shoulder  Site:  L glenohumeral  Ultrasonic Guidance for needle placement: No  Needle size:  25 G  Approach:  Posterior  Medications:  80 mg triamcinolone acetonide 40 mg/mL

## 2019-01-30 NOTE — PROGRESS NOTES
Subjective:      Patient ID: Selma Lux MD is a 83 y.o. female.    Chief Complaint: Pain of the Left Shoulder      HPI  Selma Lux MD is a  83 y.o. female presenting today for left shoulder pain. She notes she was dx with bl rotator cuff tears about 3-5 years ago, this was treated conservatively with PT as well as injections. She reports she did have improvement from this aspect. About a year ago she began having pain again, especially in the left shoulder, this worsened over the past 6 months. She has not yet tried anything for this. She is currently in PT for another reason.     10/30/18  Pt presents for follow up left shoulder pain. Since last visit, she was hospitalized for other medical problems. Therefore, she was unable to complete therapy. She notes temporary relief from the injection done last visit. She does continue to have shoulder pain and limited ROM.     1/29/19  Here for follow-up of left shoulder pain. Has been hospitalized since last visit and was intubated for about 10 days with pneumonia. She has been unable to start PT. Has trouble with overhead activities. Also seeing neurology for neck pain and torticollis who has been treating with botox injections.    Review of patient's allergies indicates:  No Known Allergies      Current Outpatient Medications   Medication Sig Dispense Refill    acetaminophen (TYLENOL) 500 MG tablet Take 1,000 mg by mouth daily as needed for Pain.      aspirin (ECOTRIN) 81 MG EC tablet Take 81 mg by mouth once daily.      carvedilol (COREG) 12.5 MG tablet Take 1 tablet (12.5 mg total) by mouth 2 (two) times daily with meals. 180 tablet 3    clopidogrel (PLAVIX) 75 mg tablet Take 75 mg by mouth once daily.      diazePAM (VALIUM) 2 MG tablet Take 1 tablet (2 mg total) by mouth as needed for Anxiety. (Patient taking differently: Take 2 mg by mouth every evening. ) 30 tablet 5    furosemide (LASIX) 20 MG tablet Take 1 tablet (20 mg total) by mouth  once daily.      gabapentin (NEURONTIN) 300 MG capsule Take 1 capsule (300 mg total) by mouth every evening. 90 capsule 2    hydroxychloroquine (PLAQUENIL) 200 mg tablet Take 2 tablets (400 mg total) by mouth once daily. 60 tablet 2    magnesium oxide (MAG-OX) 400 mg tablet Take 400 mg by mouth once daily.      montelukast (SINGULAIR) 10 mg tablet Take 1 tablet (10 mg total) by mouth every evening. 90 tablet 3    omeprazole (PRILOSEC) 20 MG capsule TAKE 1 CAPSULE DAILY 90 capsule 0    potassium chloride SA (K-DUR,KLOR-CON) 10 MEQ tablet Take 2 tablets (20 mEq total) by mouth once daily. 60 tablet 3    predniSONE (DELTASONE) 5 MG tablet Take 1 tablet (5 mg total) by mouth once daily. Resume 5 mg on 1/15/19 after you complete with the taper 60 tablet 2    sulfaSALAzine (AZULFIDINE) 500 MG TbEC Take 2 tablets (1,000 mg total) by mouth 2 (two) times daily. 120 tablet 2    traMADol (ULTRAM) 50 mg tablet Take 1-2 tablets ( mg total) by mouth every 24 hours as needed for Pain. 40 tablet 0    ciclopirox (PENLAC) 8 % Soln Apply to affected nails qhs. Clean nails with alcohol q7 days. 1 Bottle 11     Current Facility-Administered Medications   Medication Dose Route Frequency Provider Last Rate Last Dose    onabotulinumtoxina injection 200 Units  200 Units Intramuscular Q90 Days Baldomero Giang MD   200 Units at 11/27/18 1533       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     Hypertension     Hypertensive encephalopathy     IBS (irritable bowel syndrome) 6/21/2017    IGT (impaired glucose tolerance) 1/12/2016    Iron deficiency anemia secondary to inadequate dietary iron intake 6/24/2013    Joint pain     Keratoconjunctivitis sicca of both eyes not specified as Sjogren's 7/29/2016    Leiomyoma of uterus, unspecified 9/16/2014    Long QT interval 6/29/2016    Due to medication (plaquenil)     Macular edema 1/12/2015    Multinodular goiter 1/12/2016    Neuropathy 8/23/2017    Plaquenil causing adverse effect in therapeutic use 10/7/2016    Pneumococcal vaccine refused 8/17/2016    Pneumonia due to Streptococcus pneumoniae 4/5/2018    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/2015    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    s/p Right Total knee replacement 5/15/2013    Scoliosis of thoracic spine 5/15/2013    Small vessel disease, cerebrovascular 12/28/2017    Stroke     UTI (urinary tract infection) 12/29/2018    Vascular dementia 12/6/2017       Past Surgical History:   Procedure Laterality Date    CATARACT EXTRACTION      COLONOSCOPY N/A 9/29/2015    Performed by FIDELINA Sanchez MD at [facility] (4TH FLR)    EGD (ESOPHAGOGASTRODUODENOSCOPY) N/A 3/11/2014    Performed by Federico Escobar MD at [facility] ENDO (4TH FLR)    EGD (ESOPHAGOGASTRODUODENOSCOPY) N/A 11/5/2013    Performed by Federico Escobar MD at [facility] ENDO (4TH FLR)    ESOPHAGOGASTRODUODENOSCOPY (EGD) N/A 10/4/2017    Performed by Mg Morton MD at [facility] ENDO    EYE SURGERY      INJECTION-STEROID-EPIDURAL-TRANSFORAMINAL Right 9/20/2017    Per[facility]elin Valdez [facility] at [facility] PAIN MGT    JOINT REPLACEMENT      right knee    KNEE SURGERY Left 12/31/2013    TKR    left parotidectomy      mixed tumor    SALIVARY GLAND SURGERY      TONSILLECTOMY         Review of Systems:  Constitutional: Negative for chills and fever.   Respiratory: Negative for cough and shortness of breath.   "  Gastrointestinal: Negative for nausea and vomiting.   Skin: Negative for rash.   Neurological: Negative for dizziness and headaches.   Psychiatric/Behavioral: Negative for depression.   MSK as in HPI       OBJECTIVE:     PHYSICAL EXAM:  BP (!) 145/73   Pulse 88   Ht 5' 3" (1.6 m)   Wt 71.7 kg (158 lb)   LMP  (LMP Unknown)   BMI 27.99 kg/m²     GEN:  NAD, well-developed, well-groomed.  NEURO: Awake, alert, and oriented. Normal attention and concentration.    PSYCH: Normal mood and affect. Behavior is normal.  HEENT: No cervical lymphadenopathy noted.  CARDIOVASCULAR: Radial pulses 2+ bilaterally. No LE edema noted.  PULMONARY: Breath sounds normal. No respiratory distress.  SKIN: Intact, no rashes.      MSK:   LUE:  Good active ROM of the wrist and fingers. She has decreased ROM of the left shoulder, >45 degrees forward elevation and abduction, fair ER, limited IR. No significant ttp over the shoulder. AIN/PIN/Radial/Median/Ulnar Nerves assessed in isolation without deficit. Radial & Ulnar arteries palpated 2+. Capillary Refill <3s.      RADIOGRAPHS:  Xray left shoulder 3/27/18  Impression   No evidence for acute fracture, bone destruction, or dislocation.    Advanced degenerative changes of the glenohumeral joint.    Narrowing of the subacromial distance which is less pronounced than on the prior examination dated 02/05/2015.     Comments: I have personally reviewed the imaging and I agree with the above radiologist's report.    ASSESSMENT/PLAN:       ICD-10-CM ICD-9-CM   1. Osteoarthritis of left shoulder, unspecified osteoarthritis type M19.012 715.91       Orders Placed This Encounter    Ambulatory Referral to Physical Therapy        Plan:   - treatment options discussed with pt. She would like to start therapy- script printed and given to pt. Plans to go to [facility] as she had good results there a few years ago. Currently on home health therapy for prior hospitalization.  - Would like left subacromial " injection today.   - RTC 3 months

## 2019-01-30 NOTE — PROGRESS NOTES
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    Chief Complaint: Irritable Bowel Syndrome and Abdominal Pain    This is an 83-year-old female here for a follow-up visit.  She was last seen approximately 1 year ago but has had a very tumultuous year in terms of medical problems.  She was most recently admitted to the hospital with altered mental status even undergoing a lumbar puncture.  Since discharge she has noted abdominal distention which can be severe with particular dietary relations.  She has been eating a very high-fiber implant based diet.  She does have a history of peptic ulcer disease and has undergone endoscopies for this.  For the past 3 weeks that her symptoms have been slowly improving.  She still notes the symptoms occurring several times a week which can last for hours, mild-to-moderate intensity nonradiating.  It is a diffuse distention, no melena, weight loss, dark urine or jaundice. Family present to give additional history. Better more frequent BMs has improved symptoms.     The following portions of the patient's history were reviewed and updated as appropriate: allergies, current medications, past family history, past medical history, past social history, past surgical history and problem list.    (Portions of this note were dictated using voice recognition software and may contain dictation related errors in spelling/grammar/syntax not found on text review)    HPI  Review of Systems   Constitutional: Negative for appetite change and unexpected weight change.   Cardiovascular: Negative for chest pain and palpitations.   Gastrointestinal: Positive for abdominal distention. Negative for anal bleeding.       Objective:      Physical Exam   Constitutional: She is oriented to person, place, and time. She appears well-developed and well-nourished. No distress.   HENT:   Head: Normocephalic and atraumatic.   Eyes: Conjunctivae are normal. No scleral icterus.   Neck:  Normal range of motion. Neck supple. No tracheal deviation present. No thyromegaly present.   Cardiovascular: Exam reveals no friction rub.   Pulmonary/Chest: Effort normal. No respiratory distress.   Abdominal: Soft. Bowel sounds are normal. She exhibits no distension. There is no tenderness.   Neurological: She is alert and oriented to person, place, and time.   Skin: Skin is warm and dry. No rash noted. She is not diaphoretic. No erythema.   Psychiatric: She has a normal mood and affect. Her behavior is normal.   Nursing note and vitals reviewed.      Labs; reviewed  Assessment:       1. Irritable bowel syndrome, unspecified type    2. Abdominal distention        Plan:   1. Discussed low residue diet  2. Linzess for IBS-C

## 2019-01-31 ENCOUNTER — EXTERNAL CHRONIC CARE MANAGEMENT (OUTPATIENT)
Dept: PRIMARY CARE CLINIC | Facility: CLINIC | Age: 84
End: 2019-01-31
Payer: MEDICARE

## 2019-01-31 PROCEDURE — 99490 PR CHRONIC CARE MGMT, 1ST 20 MIN: ICD-10-PCS | Mod: S$PBB,,, | Performed by: FAMILY MEDICINE

## 2019-01-31 PROCEDURE — 99490 CHRNC CARE MGMT STAFF 1ST 20: CPT | Mod: S$PBB,,, | Performed by: FAMILY MEDICINE

## 2019-01-31 PROCEDURE — 99490 CHRNC CARE MGMT STAFF 1ST 20: CPT | Mod: PBBFAC | Performed by: FAMILY MEDICINE

## 2019-02-04 ENCOUNTER — OFFICE VISIT (OUTPATIENT)
Dept: PULMONOLOGY | Facility: CLINIC | Age: 84
End: 2019-02-04
Payer: MEDICARE

## 2019-02-04 ENCOUNTER — LAB VISIT (OUTPATIENT)
Dept: LAB | Facility: HOSPITAL | Age: 84
End: 2019-02-04
Attending: INTERNAL MEDICINE
Payer: MEDICARE

## 2019-02-04 VITALS
DIASTOLIC BLOOD PRESSURE: 70 MMHG | SYSTOLIC BLOOD PRESSURE: 120 MMHG | HEART RATE: 80 BPM | WEIGHT: 165.56 LBS | OXYGEN SATURATION: 96 % | BODY MASS INDEX: 29.34 KG/M2 | HEIGHT: 63 IN

## 2019-02-04 DIAGNOSIS — J84.116 CRYPTOGENIC ORGANIZING PNEUMONIA: Primary | ICD-10-CM

## 2019-02-04 DIAGNOSIS — Z79.60 LONG-TERM USE OF IMMUNOSUPPRESSANT MEDICATION: ICD-10-CM

## 2019-02-04 DIAGNOSIS — E04.1 THYROID NODULE: ICD-10-CM

## 2019-02-04 DIAGNOSIS — Z79.899 HIGH RISK MEDICATION USE: ICD-10-CM

## 2019-02-04 DIAGNOSIS — I10 HYPERTENSION, ESSENTIAL: ICD-10-CM

## 2019-02-04 DIAGNOSIS — R93.89 ABNORMAL CHEST CT: ICD-10-CM

## 2019-02-04 DIAGNOSIS — G93.40 ACUTE ENCEPHALOPATHY: ICD-10-CM

## 2019-02-04 DIAGNOSIS — M05.79 SEROPOSITIVE RHEUMATOID ARTHRITIS OF MULTIPLE SITES: ICD-10-CM

## 2019-02-04 LAB
ALBUMIN SERPL BCP-MCNC: 3.7 G/DL
ALP SERPL-CCNC: 71 U/L
ALT SERPL W/O P-5'-P-CCNC: 6 U/L
ANION GAP SERPL CALC-SCNC: 10 MMOL/L
ANION GAP SERPL CALC-SCNC: 10 MMOL/L
AST SERPL-CCNC: 9 U/L
BASOPHILS # BLD AUTO: 0.01 K/UL
BASOPHILS # BLD AUTO: 0.01 K/UL
BASOPHILS NFR BLD: 0.1 %
BASOPHILS NFR BLD: 0.1 %
BILIRUB SERPL-MCNC: 0.4 MG/DL
BUN SERPL-MCNC: 23 MG/DL
BUN SERPL-MCNC: 23 MG/DL
CALCIUM SERPL-MCNC: 9.4 MG/DL
CALCIUM SERPL-MCNC: 9.4 MG/DL
CHLORIDE SERPL-SCNC: 103 MMOL/L
CHLORIDE SERPL-SCNC: 103 MMOL/L
CO2 SERPL-SCNC: 26 MMOL/L
CO2 SERPL-SCNC: 26 MMOL/L
CREAT SERPL-MCNC: 1.1 MG/DL
CREAT SERPL-MCNC: 1.1 MG/DL
CRP SERPL-MCNC: 3.7 MG/L
DIFFERENTIAL METHOD: ABNORMAL
DIFFERENTIAL METHOD: ABNORMAL
EOSINOPHIL # BLD AUTO: 0.1 K/UL
EOSINOPHIL # BLD AUTO: 0.1 K/UL
EOSINOPHIL NFR BLD: 1.2 %
EOSINOPHIL NFR BLD: 1.2 %
ERYTHROCYTE [DISTWIDTH] IN BLOOD BY AUTOMATED COUNT: 14.6 %
ERYTHROCYTE [DISTWIDTH] IN BLOOD BY AUTOMATED COUNT: 14.6 %
ERYTHROCYTE [SEDIMENTATION RATE] IN BLOOD BY WESTERGREN METHOD: 13 MM/HR
EST. GFR  (AFRICAN AMERICAN): 54.4 ML/MIN/1.73 M^2
EST. GFR  (AFRICAN AMERICAN): 54.4 ML/MIN/1.73 M^2
EST. GFR  (NON AFRICAN AMERICAN): 47.2 ML/MIN/1.73 M^2
EST. GFR  (NON AFRICAN AMERICAN): 47.2 ML/MIN/1.73 M^2
GLUCOSE SERPL-MCNC: 120 MG/DL
GLUCOSE SERPL-MCNC: 120 MG/DL
HCT VFR BLD AUTO: 44.4 %
HCT VFR BLD AUTO: 44.4 %
HGB BLD-MCNC: 12.9 G/DL
HGB BLD-MCNC: 12.9 G/DL
IMM GRANULOCYTES # BLD AUTO: 0.13 K/UL
IMM GRANULOCYTES # BLD AUTO: 0.13 K/UL
IMM GRANULOCYTES NFR BLD AUTO: 1.4 %
IMM GRANULOCYTES NFR BLD AUTO: 1.4 %
LYMPHOCYTES # BLD AUTO: 1.4 K/UL
LYMPHOCYTES # BLD AUTO: 1.4 K/UL
LYMPHOCYTES NFR BLD: 14.5 %
LYMPHOCYTES NFR BLD: 14.5 %
MCH RBC QN AUTO: 26.8 PG
MCH RBC QN AUTO: 26.8 PG
MCHC RBC AUTO-ENTMCNC: 29.1 G/DL
MCHC RBC AUTO-ENTMCNC: 29.1 G/DL
MCV RBC AUTO: 92 FL
MCV RBC AUTO: 92 FL
MONOCYTES # BLD AUTO: 1.2 K/UL
MONOCYTES # BLD AUTO: 1.2 K/UL
MONOCYTES NFR BLD: 12.8 %
MONOCYTES NFR BLD: 12.8 %
NEUTROPHILS # BLD AUTO: 6.6 K/UL
NEUTROPHILS # BLD AUTO: 6.6 K/UL
NEUTROPHILS NFR BLD: 70 %
NEUTROPHILS NFR BLD: 70 %
NRBC BLD-RTO: 0 /100 WBC
NRBC BLD-RTO: 0 /100 WBC
PLATELET # BLD AUTO: 205 K/UL
PLATELET # BLD AUTO: 205 K/UL
PMV BLD AUTO: 10.7 FL
PMV BLD AUTO: 10.7 FL
POTASSIUM SERPL-SCNC: 4.9 MMOL/L
POTASSIUM SERPL-SCNC: 4.9 MMOL/L
PROT SERPL-MCNC: 6.8 G/DL
RBC # BLD AUTO: 4.81 M/UL
RBC # BLD AUTO: 4.81 M/UL
SODIUM SERPL-SCNC: 139 MMOL/L
SODIUM SERPL-SCNC: 139 MMOL/L
TSH SERPL DL<=0.005 MIU/L-ACNC: 0.78 UIU/ML
WBC # BLD AUTO: 9.4 K/UL
WBC # BLD AUTO: 9.4 K/UL

## 2019-02-04 PROCEDURE — 36415 COLL VENOUS BLD VENIPUNCTURE: CPT

## 2019-02-04 PROCEDURE — 99213 OFFICE O/P EST LOW 20 MIN: CPT | Mod: S$PBB,,, | Performed by: INTERNAL MEDICINE

## 2019-02-04 PROCEDURE — 99999 PR PBB SHADOW E&M-EST. PATIENT-LVL IV: CPT | Mod: PBBFAC,,, | Performed by: INTERNAL MEDICINE

## 2019-02-04 PROCEDURE — 99213 PR OFFICE/OUTPT VISIT, EST, LEVL III, 20-29 MIN: ICD-10-PCS | Mod: S$PBB,,, | Performed by: INTERNAL MEDICINE

## 2019-02-04 PROCEDURE — 85652 RBC SED RATE AUTOMATED: CPT

## 2019-02-04 PROCEDURE — 84443 ASSAY THYROID STIM HORMONE: CPT

## 2019-02-04 PROCEDURE — 86140 C-REACTIVE PROTEIN: CPT

## 2019-02-04 PROCEDURE — 99214 OFFICE O/P EST MOD 30 MIN: CPT | Mod: PBBFAC | Performed by: INTERNAL MEDICINE

## 2019-02-04 PROCEDURE — 99999 PR PBB SHADOW E&M-EST. PATIENT-LVL IV: ICD-10-PCS | Mod: PBBFAC,,, | Performed by: INTERNAL MEDICINE

## 2019-02-04 PROCEDURE — 85025 COMPLETE CBC W/AUTO DIFF WBC: CPT

## 2019-02-04 PROCEDURE — 80053 COMPREHEN METABOLIC PANEL: CPT

## 2019-02-07 ENCOUNTER — TELEPHONE (OUTPATIENT)
Dept: INTERNAL MEDICINE | Facility: CLINIC | Age: 84
End: 2019-02-07

## 2019-02-07 NOTE — TELEPHONE ENCOUNTER
----- Message from Bess Hare sent at 2/7/2019  3:51 PM CST -----  Contact: Wilman Ryan/[phone]      ----- Message -----  From: Lorelei Romo  Sent: 2/7/2019   3:41 PM  To: [facility] Staff    Type:Home Health(orders,updates,clarifications,etc)    Home Health Agency/Nurse:Wilman Ryan    Phone number: [phone]    Reason for call:    Comments:Please extend Dr Lux one more week and then transition patient to outpatient. Please call and advise. Thank you

## 2019-02-09 NOTE — PROGRESS NOTES
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  At her last visit with me on 11/12/2018, she was gradually improving from a respiratory standpoint with resumption of prednisone during her hospital hospitalization.  She has also followed with Lonnie Rouse in Rheumatology who had recommended slow steroid taper and initiation of a second agent (sulfasalazine).  At our November visit, she was making gradual progress in endurance and room air SpO2 was 96% at rest.      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  Work up was unrevealing and MRI was not consistent with PRESS despite initially difficult to control blood pressure.  She required ICU care from December 19-27 for altered mental status but did not exhibit worsening of her respiratory status.  CXR from 12/25/2019 showed continued clearing of parenchymal infiltrates with only modest residual scarring/atelectasis in the upper lobes.  She was eventually discharged to inpatient rehab at [facility] with plans for gradual steroid taper.    At this visit, she denies respiratory complaints.  She continues to work with PT to improve her strength.  She denies cough, sputum production, pleurisy, or other respiratory complaints.  She has had much leg swelling and has not required any bump in her diuretic regimen.  She is presently taking prednisone 15 mg daily and sulfasalazine 1000 mg twice daily.    Review of Systems   Constitutional: Positive for activity change, fatigue and weakness. Negative for fever, chills, weight loss and night sweats.   Respiratory: Positive for previous hospitalization due to pulmonary problems and dyspnea on  extertion. Negative for cough, hemoptysis, sputum production, chest tightness, shortness of breath, wheezing, orthopnea, pleurisy and use of rescue inhaler.    Cardiovascular: Negative for chest pain, palpitations and leg swelling.   Gastrointestinal: Negative for acid reflux.       Objective:      Physical Exam   Constitutional: She is oriented to person, place, and time. No distress.   Cardiovascular: Normal rate, regular rhythm and normal heart sounds. Exam reveals no gallop.   No murmur heard.  Pulmonary/Chest: Normal expansion, symmetric chest wall expansion, effort normal and breath sounds normal. No stridor. No respiratory distress. She has no decreased breath sounds. She has no wheezes. She has no rhonchi. She has no rales.   Resting SpO2 96% while breathing room air.   Musculoskeletal: She exhibits no edema.   Neurological: She is alert and oriented to person, place, and time.   In a wheelchair at this visit.   Skin: Nails show no clubbing.   Psychiatric: Judgment normal.   Somewhat depressed affect.   Nursing note and vitals reviewed.    Personal Diagnostic Review    CXRs from December 2018 hospitalization were personally reviewed with the patient and her daughter.  Films show improved air-space disease compared to past images.    No flowsheet data found.      Assessment:       1. Cryptogenic organizing pneumonia    2. Abnormal chest CT    3. Long-term use of immunosuppressant medication        Outpatient Encounter Medications as of 2/4/2019   Medication Sig Dispense Refill    acetaminophen (TYLENOL) 500 MG tablet Take 1,000 mg by mouth daily as needed for Pain.      aspirin (ECOTRIN) 81 MG EC tablet Take 81 mg by mouth once daily.      carvedilol (COREG) 12.5 MG tablet Take 1 tablet (12.5 mg total) by mouth 2 (two) times daily with meals. 180 tablet 3    clopidogrel (PLAVIX) 75 mg tablet Take 75 mg by mouth once daily.      diazePAM (VALIUM) 2 MG tablet Take 1 tablet (2 mg total) by mouth as needed  for Anxiety. (Patient taking differently: Take 2 mg by mouth every evening. ) 30 tablet 5    furosemide (LASIX) 20 MG tablet Take 1 tablet (20 mg total) by mouth once daily.      gabapentin (NEURONTIN) 300 MG capsule Take 1 capsule (300 mg total) by mouth every evening. 90 capsule 2    hydroxychloroquine (PLAQUENIL) 200 mg tablet Take 2 tablets (400 mg total) by mouth once daily. 60 tablet 2    linaclotide (LINZESS) 145 mcg Cap capsule Take 1 capsule (145 mcg total) by mouth once daily. 30 capsule 0    magnesium oxide (MAG-OX) 400 mg tablet Take 400 mg by mouth once daily.      montelukast (SINGULAIR) 10 mg tablet Take 1 tablet (10 mg total) by mouth every evening. 90 tablet 3    omeprazole (PRILOSEC) 20 MG capsule TAKE 1 CAPSULE DAILY 90 capsule 0    potassium chloride SA (K-DUR,KLOR-CON) 10 MEQ tablet Take 2 tablets (20 mEq total) by mouth once daily. 60 tablet 3    predniSONE (DELTASONE) 5 MG tablet Take 1 tablet (5 mg total) by mouth once daily. Resume 5 mg on 1/15/19 after you complete with the taper 60 tablet 2    sulfaSALAzine (AZULFIDINE) 500 MG TbEC Take 2 tablets (1,000 mg total) by mouth 2 (two) times daily. 120 tablet 2    traMADol (ULTRAM) 50 mg tablet Take 1-2 tablets ( mg total) by mouth every 24 hours as needed for Pain. 40 tablet 0    ciclopirox (PENLAC) 8 % Soln Apply to affected nails qhs. Clean nails with alcohol q7 days. 1 Bottle 11     Facility-Administered Encounter Medications as of 2/4/2019   Medication Dose Route Frequency Provider Last Rate Last Dose    onabotulinumtoxina injection 200 Units  200 Units Intramuscular Q90 Days Baldomero Giang MD   200 Units at 11/27/18 1533     No orders of the defined types were placed in this encounter.      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  She is scheduled to see Dr. Rouse (Rheumatology) in the next few weeks.  I will defer decision about steroid weaning to  him.    Problem List Items Addressed This Visit     Abnormal chest CT    Current Assessment & Plan     See assessment/plan under .         Cryptogenic organizing pneumonia - Primary    Overview     Clinically suspected due to recurring infiltrates on CXR in the setting of rheumatoid arthritis.  Work up for infection has been negative and the clinical picture is less consistent with acute infection.  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    · Slowly wean prednisone as tolerated.  · Now on sulfasalazine as per Dr. Rouse (Rheumatology).  It is probably reasonable to avoid biologics unless absolutely necessary given prior flare after infliximab.  · Repeat CXR from 12/25/2018 shows continued clearing.  · Consider repeat chest CT (non-contrast) once prednisone is at stable lowest dose possible.         Long-term use of immunosuppressant medication    Overview     · Sulfasalazine/prednisone taper as per Rheumatology.               I have copied Dr. Rouse (Rheumatology) and Dr. Lee (Primary Care) on this note.    Baldomero Francis MD  Pulmonary/Critical Care Medicine

## 2019-02-11 ENCOUNTER — OFFICE VISIT (OUTPATIENT)
Dept: RHEUMATOLOGY | Facility: CLINIC | Age: 84
End: 2019-02-11
Payer: MEDICARE

## 2019-02-11 VITALS
DIASTOLIC BLOOD PRESSURE: 74 MMHG | WEIGHT: 165.38 LBS | SYSTOLIC BLOOD PRESSURE: 118 MMHG | BODY MASS INDEX: 29.29 KG/M2 | HEART RATE: 96 BPM

## 2019-02-11 DIAGNOSIS — J84.116 CRYPTOGENIC ORGANIZING PNEUMONIA: ICD-10-CM

## 2019-02-11 DIAGNOSIS — M05.79 SEROPOSITIVE RHEUMATOID ARTHRITIS OF MULTIPLE SITES: Primary | ICD-10-CM

## 2019-02-11 DIAGNOSIS — Z79.60 LONG-TERM USE OF IMMUNOSUPPRESSANT MEDICATION: ICD-10-CM

## 2019-02-11 PROBLEM — D69.6 THROMBOCYTOPENIA: Status: RESOLVED | Noted: 2018-12-16 | Resolved: 2019-02-11

## 2019-02-11 PROBLEM — G93.40 ACUTE ENCEPHALOPATHY: Status: RESOLVED | Noted: 2018-12-19 | Resolved: 2019-02-11

## 2019-02-11 PROCEDURE — 99214 OFFICE O/P EST MOD 30 MIN: CPT | Mod: S$PBB,,, | Performed by: INTERNAL MEDICINE

## 2019-02-11 PROCEDURE — 99214 PR OFFICE/OUTPT VISIT, EST, LEVL IV, 30-39 MIN: ICD-10-PCS | Mod: S$PBB,,, | Performed by: INTERNAL MEDICINE

## 2019-02-11 PROCEDURE — 99213 OFFICE O/P EST LOW 20 MIN: CPT | Mod: PBBFAC | Performed by: INTERNAL MEDICINE

## 2019-02-11 PROCEDURE — 99999 PR PBB SHADOW E&M-EST. PATIENT-LVL III: ICD-10-PCS | Mod: PBBFAC,,, | Performed by: INTERNAL MEDICINE

## 2019-02-11 PROCEDURE — 99999 PR PBB SHADOW E&M-EST. PATIENT-LVL III: CPT | Mod: PBBFAC,,, | Performed by: INTERNAL MEDICINE

## 2019-02-11 RX ORDER — SULFASALAZINE 500 MG/1
1000 TABLET, DELAYED RELEASE ORAL 2 TIMES DAILY
Qty: 120 TABLET | Refills: 2 | Status: ON HOLD | OUTPATIENT
Start: 2019-02-11 | End: 2019-03-14 | Stop reason: SDUPTHER

## 2019-02-11 RX ORDER — HYDROXYCHLOROQUINE SULFATE 200 MG/1
200 TABLET, FILM COATED ORAL 2 TIMES DAILY
Qty: 60 TABLET | Refills: 2 | Status: SHIPPED | OUTPATIENT
Start: 2019-02-11 | End: 2019-06-04 | Stop reason: SDUPTHER

## 2019-02-11 RX ORDER — PREDNISONE 5 MG/1
12.5 TABLET ORAL DAILY
Qty: 90 TABLET | Refills: 2 | Status: ON HOLD | OUTPATIENT
Start: 2019-02-11 | End: 2019-03-14 | Stop reason: HOSPADM

## 2019-02-11 ASSESSMENT — ROUTINE ASSESSMENT OF PATIENT INDEX DATA (RAPID3)
PATIENT GLOBAL ASSESSMENT SCORE: 6
WHEN YOU AWAKENED IN THE MORNING OVER THE LAST WEEK, PLEASE INDICATE THE AMOUNT OF TIME IT TAKES UNTIL YOU ARE AS LIMBER AS YOU WILL BE FOR THE DAY: 20MIN
PAIN SCORE: 2
TOTAL RAPID3 SCORE: 3.78
FATIGUE SCORE: 6
PSYCHOLOGICAL DISTRESS SCORE: 2.2
AM STIFFNESS SCORE: 1, YES
MDHAQ FUNCTION SCORE: 1

## 2019-02-11 NOTE — ASSESSMENT & PLAN NOTE
Dec CXR showed improvement and dyspnea is better so will continue prednisone taper to get back to 5 mg/d baseline

## 2019-02-11 NOTE — PATIENT INSTRUCTIONS
Reduce prednisone 2.5 mg every 2 weeks  12.5 mg/d for 2 weeks,  10 mg/d for 2 weeks,  7.5 mg/d for 2 weeks,  Then 5 mg/d until returns    Continue sulfasalazine and hydroxychloroquine for now

## 2019-02-11 NOTE — PROGRESS NOTES
Subjective:       Patient ID: Selma Rosado MD Jose J is a 83 y.o. female.    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   HENT: Negative for mouth sores and trouble swallowing.         No Dry mouth   Eyes: Negative for redness.        No Dry eyes   Respiratory: Positive for shortness of breath. Negative for cough.         No dyspnea at rest    Cardiovascular: Negative for chest pain.   Gastrointestinal: Positive for diarrhea. Negative for abdominal pain and constipation.        IBS with diarrhea on Linzess   Genitourinary: Negative for dysuria and genital sores.   Skin: Negative for rash.   Neurological: Positive for headaches.   Hematological: Negative for adenopathy. Does not bruise/bleed easily.   Psychiatric/Behavioral: Negative for sleep disturbance.         Objective:   /74   Pulse 96   Wt 75 kg (165 lb 5.5 oz)   LMP  (LMP Unknown)   BMI 29.29 kg/m²      Physical Exam   Constitutional: She is well-developed, well-nourished, and in no distress.    HENT:   Mouth/Throat: Oropharynx is clear and moist.   Eyes: Conjunctivae are normal.   Cardiovascular: Normal rate and regular rhythm.    Pulmonary/Chest: She has no wheezes. She has no rales.   Lymphadenopathy:     She has no cervical adenopathy.   Skin: No rash noted.          Physical Exam     Tenderness:   Left hand: 4th PIP and 5th PIP    SHEIKH-28 tender joint count: 2  SHEIKH-28 swollen joint count: 0  ESR (mm/hr): 13  Patient global assessment: 60  SHEIKH-28 score: 3.43 (Moderate Disease Activity)    Lab Results   Component Value Date    SEDRATE 13 02/04/2019   .   Lab Results   Component Value Date    WBC 9.40 02/04/2019    WBC 9.40 02/04/2019    HGB 12.9 02/04/2019    HGB 12.9 02/04/2019    HCT 44.4 02/04/2019    HCT 44.4 02/04/2019    MCV 92 02/04/2019    MCV 92 02/04/2019     02/04/2019     02/04/2019      Assessment:       1. Seropositive rheumatoid arthritis of multiple sites    2. Long-term use of immunosuppressant medication    3. Cryptogenic organizing pneumonia            Plan:       Problem List Items Addressed This Visit        Active Problems    Seropositive rheumatoid arthritis of multiple sites - Primary     RA now doing well but had interval viral encephalitis  Dyspnea has been stable  Cont pred taper by reducing 2.5 mg every 2 weeks until gets to 5 mg/d and stay at 5 mg/d until f/u  Cont SSZ 2000/d and /d  RTC me 3 months with lab           Relevant Medications    sulfaSALAzine (AZULFIDINE) 500 MG TbEC    predniSONE (DELTASONE) 5 MG tablet    hydroxychloroquine (PLAQUENIL) 200 mg tablet    Long-term use of immunosuppressant medication     I am surprised that she had viral encephalitis as HCQ and SSZ are not usually associated with opportunistic infections    Tolerating drugs with out symptomatic AE or lab AE         Cryptogenic organizing pneumonia     Dec CXR showed improvement and dyspnea is better so will continue prednisone taper to get back to 5 mg/d baseline            RTC 3 mo with lab

## 2019-02-11 NOTE — ASSESSMENT & PLAN NOTE
I am surprised that she had viral encephalitis as HCQ and SSZ are not usually associated with opportunistic infections    Tolerating drugs with out symptomatic AE or lab AE

## 2019-02-11 NOTE — ASSESSMENT & PLAN NOTE
RA now doing well but had interval viral encephalitis  Dyspnea has been stable  Cont pred taper by reducing 2.5 mg every 2 weeks until gets to 5 mg/d and stay at 5 mg/d until f/u  Cont SSZ 2000/d and /d  RTC me 3 months with lab

## 2019-02-18 ENCOUNTER — TELEPHONE (OUTPATIENT)
Dept: INTERNAL MEDICINE | Facility: CLINIC | Age: 84
End: 2019-02-18

## 2019-02-18 DIAGNOSIS — Z86.73 HISTORY OF STROKE: Primary | ICD-10-CM

## 2019-02-18 DIAGNOSIS — J18.9 PNEUMONIA OF BOTH LUNGS DUE TO INFECTIOUS ORGANISM, UNSPECIFIED PART OF LUNG: ICD-10-CM

## 2019-02-18 DIAGNOSIS — G24.9 DYSTONIA: ICD-10-CM

## 2019-02-18 DIAGNOSIS — R53.81 DEBILITY: ICD-10-CM

## 2019-02-18 NOTE — TELEPHONE ENCOUNTER
----- Message from Miguel Angel Kat sent at 2/18/2019  3:54 PM CST -----  Contact: Daughter Ms. Lux [phone]  Type: Orders Request    What orders/ testing are being requested? Out Patient Physical Therapy    Is there a future appointment scheduled for the patient with PCP? No    When?    Comments: Daughter calling stating patient has finished in patient  PT and Occupational Therapy, now is requesting Out Patient PT    Please call an advise  Thank you

## 2019-02-19 ENCOUNTER — HOSPITAL ENCOUNTER (OUTPATIENT)
Dept: RADIOLOGY | Facility: HOSPITAL | Age: 84
Discharge: HOME | End: 2019-02-19
Attending: FAMILY MEDICINE
Payer: MEDICARE

## 2019-02-19 DIAGNOSIS — G24.3 CERVICAL DYSTONIA: Primary | ICD-10-CM

## 2019-02-19 DIAGNOSIS — Z12.31 ENCOUNTER FOR SCREENING MAMMOGRAM FOR MALIGNANT NEOPLASM OF BREAST: ICD-10-CM

## 2019-02-19 PROCEDURE — 77067 MAMMO DIGITAL SCREENING BILAT WITH CAD: ICD-10-PCS | Mod: 26,,, | Performed by: RADIOLOGY

## 2019-02-19 PROCEDURE — 77067 SCR MAMMO BI INCL CAD: CPT | Mod: 26,,, | Performed by: RADIOLOGY

## 2019-02-19 PROCEDURE — 77067 SCR MAMMO BI INCL CAD: CPT | Mod: TC

## 2019-02-20 ENCOUNTER — TELEPHONE (OUTPATIENT)
Dept: RADIOLOGY | Facility: HOSPITAL | Age: 84
End: 2019-02-20

## 2019-02-20 ENCOUNTER — DOCUMENTATION ONLY (OUTPATIENT)
Dept: INTERNAL MEDICINE | Facility: CLINIC | Age: 84
End: 2019-02-20

## 2019-02-20 RX ORDER — OMEPRAZOLE 20 MG/1
20 CAPSULE, DELAYED RELEASE ORAL DAILY
Qty: 90 CAPSULE | Refills: 0 | Status: SHIPPED | OUTPATIENT
Start: 2019-02-20 | End: 2019-05-21 | Stop reason: SDUPTHER

## 2019-02-20 NOTE — TELEPHONE ENCOUNTER
Please see external referral orders and please call patient to schedule a consult with ocuupational and physical therapy - I believe they have a place in mind - please contact carloset. Thank you.

## 2019-02-20 NOTE — TELEPHONE ENCOUNTER
Spoke with patient and explained mammogram findings.Patient expressed understanding of results. Patient is scheduled for a abnormal mammogram follow up appointment at [facility] on 2/20/2019.

## 2019-02-22 ENCOUNTER — TELEPHONE (OUTPATIENT)
Dept: INTERNAL MEDICINE | Facility: CLINIC | Age: 84
End: 2019-02-22

## 2019-02-22 NOTE — TELEPHONE ENCOUNTER
----- Message from Susu Russell sent at 2/22/2019  2:32 PM CST -----  Contact: Rosy / [phone]  Please call about patient referral for physical therapy.    This is  the third request.

## 2019-02-25 ENCOUNTER — HOSPITAL ENCOUNTER (OUTPATIENT)
Dept: RADIOLOGY | Facility: HOSPITAL | Age: 84
Discharge: HOME | End: 2019-02-25
Attending: FAMILY MEDICINE
Payer: MEDICARE

## 2019-02-25 DIAGNOSIS — R92.8 ABNORMAL MAMMOGRAM: ICD-10-CM

## 2019-02-25 PROCEDURE — 77061 MAMMO DIGITAL DIAGNOSTIC LEFT WITH TOMOSYNTHESIS_CAD: ICD-10-PCS | Mod: 26,LT,, | Performed by: RADIOLOGY

## 2019-02-25 PROCEDURE — 77065 DX MAMMO INCL CAD UNI: CPT | Mod: TC,PO,LT

## 2019-02-25 PROCEDURE — 77065 DX MAMMO INCL CAD UNI: CPT | Mod: 26,LT,, | Performed by: RADIOLOGY

## 2019-02-25 PROCEDURE — 77061 BREAST TOMOSYNTHESIS UNI: CPT | Mod: 26,LT,, | Performed by: RADIOLOGY

## 2019-02-25 PROCEDURE — 77065 MAMMO DIGITAL DIAGNOSTIC LEFT WITH TOMOSYNTHESIS_CAD: ICD-10-PCS | Mod: 26,LT,, | Performed by: RADIOLOGY

## 2019-02-26 ENCOUNTER — TELEPHONE (OUTPATIENT)
Dept: INTERNAL MEDICINE | Facility: CLINIC | Age: 84
End: 2019-02-26

## 2019-02-26 NOTE — TELEPHONE ENCOUNTER
Spoke with pt daughter she provided me with another fax number to send over to [facility] . Fax sent

## 2019-03-06 ENCOUNTER — TELEPHONE (OUTPATIENT)
Dept: INTERNAL MEDICINE | Facility: CLINIC | Age: 84
End: 2019-03-06

## 2019-03-06 ENCOUNTER — TELEPHONE (OUTPATIENT)
Dept: NEUROLOGY | Facility: CLINIC | Age: 84
End: 2019-03-06

## 2019-03-06 DIAGNOSIS — I63.9 CEREBROVASCULAR ACCIDENT (CVA), UNSPECIFIED MECHANISM: ICD-10-CM

## 2019-03-06 DIAGNOSIS — R13.12 OROPHARYNGEAL DYSPHAGIA: Primary | ICD-10-CM

## 2019-03-06 RX ORDER — CLOPIDOGREL BISULFATE 75 MG/1
TABLET ORAL
Qty: 90 TABLET | Refills: 1 | Status: SHIPPED | OUTPATIENT
Start: 2019-03-06 | End: 2019-04-30 | Stop reason: SDUPTHER

## 2019-03-06 NOTE — TELEPHONE ENCOUNTER
Returned call to Rosy who wanted an appointment this week, advised Dr. Giang is on vacation. Appointment scheduled for 3/13/2019 at 1:00 PM.

## 2019-03-07 NOTE — TELEPHONE ENCOUNTER
Please see below and advise. Thank you  ----- Message from Amador Macdonald sent at 3/7/2019 10:08 AM CST -----  Contact: Erci Bedolla/ Bettye [phone]  The patient's family is requesting orders for Speech Therapy be added to the original orders that Bettye at [facility] received. Please fax the order to [phone].    Thank you

## 2019-03-08 ENCOUNTER — DOCUMENTATION ONLY (OUTPATIENT)
Dept: INTERNAL MEDICINE | Facility: CLINIC | Age: 84
End: 2019-03-08

## 2019-03-08 NOTE — TELEPHONE ENCOUNTER
Please forward external referral/lab orders per patient request (fax or provide to patient). Please call and verify that any faxes sent were received. Thank you.

## 2019-03-11 ENCOUNTER — HOSPITAL ENCOUNTER (OUTPATIENT)
Dept: RADIOLOGY | Facility: OTHER | Age: 84
Discharge: HOME | DRG: 196 | End: 2019-03-11
Attending: FAMILY MEDICINE
Payer: MEDICARE

## 2019-03-11 ENCOUNTER — OFFICE VISIT (OUTPATIENT)
Dept: URGENT CARE | Facility: CLINIC | Age: 84
End: 2019-03-11
Payer: MEDICARE

## 2019-03-11 VITALS
OXYGEN SATURATION: 97 % | SYSTOLIC BLOOD PRESSURE: 146 MMHG | TEMPERATURE: 99 F | RESPIRATION RATE: 17 BRPM | DIASTOLIC BLOOD PRESSURE: 84 MMHG | HEART RATE: 101 BPM

## 2019-03-11 DIAGNOSIS — M25.552 CHRONIC PAIN OF BOTH HIPS: ICD-10-CM

## 2019-03-11 DIAGNOSIS — M25.551 CHRONIC PAIN OF BOTH HIPS: ICD-10-CM

## 2019-03-11 DIAGNOSIS — G89.29 CHRONIC PAIN OF BOTH HIPS: ICD-10-CM

## 2019-03-11 DIAGNOSIS — J18.9 PNEUMONIA OF BOTH LUNGS DUE TO INFECTIOUS ORGANISM, UNSPECIFIED PART OF LUNG: ICD-10-CM

## 2019-03-11 DIAGNOSIS — R06.02 SHORTNESS OF BREATH: Primary | ICD-10-CM

## 2019-03-11 DIAGNOSIS — R06.02 SHORTNESS OF BREATH: ICD-10-CM

## 2019-03-11 LAB
CTP QC/QA: YES
FLUAV AG NPH QL: NEGATIVE
FLUBV AG NPH QL: NEGATIVE

## 2019-03-11 PROCEDURE — 94640 PR INHAL RX, AIRWAY OBST/DX SPUTUM INDUCT: ICD-10-PCS | Mod: S$GLB,,, | Performed by: EMERGENCY MEDICINE

## 2019-03-11 PROCEDURE — 73521 X-RAY EXAM HIPS BI 2 VIEWS: CPT | Mod: 26,,, | Performed by: RADIOLOGY

## 2019-03-11 PROCEDURE — 73521 XR HIPS BILATERAL 2 VIEW INCL AP PELVIS: ICD-10-PCS | Mod: 26,,, | Performed by: RADIOLOGY

## 2019-03-11 PROCEDURE — 99214 PR OFFICE/OUTPT VISIT, EST, LEVL IV, 30-39 MIN: ICD-10-PCS | Mod: 25,S$GLB,, | Performed by: FAMILY MEDICINE

## 2019-03-11 PROCEDURE — 93000 ELECTROCARDIOGRAM COMPLETE: CPT | Mod: S$GLB,,, | Performed by: INTERNAL MEDICINE

## 2019-03-11 PROCEDURE — 93000 ELECTROCARDIOGRAM COMPLETE: CPT | Mod: S$GLB,,, | Performed by: FAMILY MEDICINE

## 2019-03-11 PROCEDURE — 73521 X-RAY EXAM HIPS BI 2 VIEWS: CPT | Mod: TC,FY

## 2019-03-11 PROCEDURE — 93000 PR ELECTROCARDIOGRAM, COMPLETE: ICD-10-PCS | Mod: S$GLB,,, | Performed by: FAMILY MEDICINE

## 2019-03-11 PROCEDURE — 71046 X-RAY EXAM CHEST 2 VIEWS: CPT | Mod: TC,FY

## 2019-03-11 PROCEDURE — 96372 THER/PROPH/DIAG INJ SC/IM: CPT | Mod: 59,S$GLB,, | Performed by: EMERGENCY MEDICINE

## 2019-03-11 PROCEDURE — 94640 AIRWAY INHALATION TREATMENT: CPT | Mod: S$GLB,,, | Performed by: EMERGENCY MEDICINE

## 2019-03-11 PROCEDURE — 87804 POCT INFLUENZA A/B: ICD-10-PCS | Mod: QW,S$GLB,, | Performed by: FAMILY MEDICINE

## 2019-03-11 PROCEDURE — 96372 PR INJECTION,THERAP/PROPH/DIAG2ST, IM OR SUBCUT: ICD-10-PCS | Mod: 59,S$GLB,, | Performed by: EMERGENCY MEDICINE

## 2019-03-11 PROCEDURE — 71046 X-RAY EXAM CHEST 2 VIEWS: CPT | Mod: 26,,, | Performed by: RADIOLOGY

## 2019-03-11 PROCEDURE — 87804 INFLUENZA ASSAY W/OPTIC: CPT | Mod: QW,S$GLB,, | Performed by: FAMILY MEDICINE

## 2019-03-11 PROCEDURE — 71046 XR CHEST PA AND LATERAL: ICD-10-PCS | Mod: 26,,, | Performed by: RADIOLOGY

## 2019-03-11 PROCEDURE — 99214 OFFICE O/P EST MOD 30 MIN: CPT | Mod: 25,S$GLB,, | Performed by: FAMILY MEDICINE

## 2019-03-11 PROCEDURE — 93000 EKG 12-LEAD: ICD-10-PCS | Mod: S$GLB,,, | Performed by: INTERNAL MEDICINE

## 2019-03-11 RX ORDER — LEVOFLOXACIN 750 MG/1
750 TABLET ORAL DAILY
Qty: 7 TABLET | Refills: 0 | Status: ON HOLD | OUTPATIENT
Start: 2019-03-11 | End: 2019-03-18 | Stop reason: HOSPADM

## 2019-03-11 RX ORDER — ALBUTEROL SULFATE 0.83 MG/ML
2.5 SOLUTION RESPIRATORY (INHALATION)
Status: COMPLETED | OUTPATIENT
Start: 2019-03-11 | End: 2019-03-11

## 2019-03-11 RX ORDER — CEFTRIAXONE 1 G/1
1 INJECTION, POWDER, FOR SOLUTION INTRAMUSCULAR; INTRAVENOUS
Status: COMPLETED | OUTPATIENT
Start: 2019-03-11 | End: 2019-03-11

## 2019-03-11 RX ORDER — ALBUTEROL SULFATE 90 UG/1
2 AEROSOL, METERED RESPIRATORY (INHALATION) EVERY 6 HOURS PRN
Qty: 1 INHALER | Refills: 0 | Status: ON HOLD | OUTPATIENT
Start: 2019-03-11 | End: 2020-03-10 | Stop reason: HOSPADM

## 2019-03-11 RX ADMIN — ALBUTEROL SULFATE 2.5 MG: 0.83 SOLUTION RESPIRATORY (INHALATION) at 07:03

## 2019-03-11 RX ADMIN — CEFTRIAXONE 1 G: 1 INJECTION, POWDER, FOR SOLUTION INTRAMUSCULAR; INTRAVENOUS at 07:03

## 2019-03-11 NOTE — PROGRESS NOTES
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    Vitals:  tympanic temperature is 99.3 °F (37.4 °C). Her blood pressure is 146/84 (abnormal) and her pulse is 101. Her respiration is 17 and oxygen saturation is 97%.     Chief Complaint: URI    Pt states she is experiencing cough, congestion, fatigue, body aches for 4 days. Pt states she was at physical therapy today and her pulse oxygen was 85.  Pt states she is on a step down of steroids - family states usually when she starts to decrease the steroids this happens. This has not been the first time she has had low o2 sat. . She was in the hospital last April with pneumonia. No fever. Not taking anything otc for cough. States the sob has been for a while now, intermittently.  Not worse with exertion. No chest pain, palpitations, arm pain, jaw pain, diaphoresis, leg swelling. Hx of a fib.       URI    This is a new problem. The current episode started 1 to 4 weeks ago. The problem has been gradually worsening. Associated symptoms include congestion, coughing and headaches. Pertinent negatives include no ear pain, nausea, rash, sinus pain, sore throat, vomiting or wheezing. Associated symptoms comments: Fatigue, and body aches, shortness of breath. She has tried nothing for the symptoms.       Constitution: Positive for fatigue and generalized weakness. Negative for chills, sweating and fever.   HENT: Positive for congestion. Negative for ear pain, sinus pain, sinus pressure, sore throat and voice change.    Neck: Negative for painful lymph nodes.   Eyes: Negative for eye redness.   Respiratory: Positive for cough and shortness of breath. Negative for chest tightness, sputum production, bloody sputum, COPD, stridor, wheezing and asthma.    Gastrointestinal: Negative for nausea and vomiting.   Musculoskeletal: Negative for muscle ache.   Skin: Negative for rash.   Allergic/Immunologic: Negative for seasonal allergies and asthma.    Neurological: Positive for headaches.   Hematologic/Lymphatic: Negative for swollen lymph nodes.       Objective:      Physical Exam   Constitutional: She is oriented to person, place, and time. She appears well-developed and well-nourished. She is cooperative.  Non-toxic appearance. She does not appear ill. No distress.   HENT:   Head: Normocephalic and atraumatic.   Right Ear: Hearing, tympanic membrane, external ear and ear canal normal.   Left Ear: Hearing, tympanic membrane, external ear and ear canal normal.   Nose: Nose normal. No mucosal edema, rhinorrhea or nasal deformity. No epistaxis. Right sinus exhibits no maxillary sinus tenderness and no frontal sinus tenderness. Left sinus exhibits no maxillary sinus tenderness and no frontal sinus tenderness.   Mouth/Throat: Uvula is midline, oropharynx is clear and moist and mucous membranes are normal. No trismus in the jaw. Normal dentition. No uvula swelling. No posterior oropharyngeal erythema.   Eyes: Conjunctivae and lids are normal. No scleral icterus.   Sclera clear bilat   Neck: Trachea normal, full passive range of motion without pain and phonation normal. Neck supple.   Cardiovascular: Normal rate, regular rhythm, normal heart sounds, intact distal pulses and normal pulses.   Pulmonary/Chest: Effort normal and breath sounds normal. No accessory muscle usage. No tachypnea. No respiratory distress.   Coarse BS SKY  NAD able to speak in clear complete sentences   Abdominal: Soft. Normal appearance and bowel sounds are normal. She exhibits no distension. There is no tenderness.   Musculoskeletal: Normal range of motion. She exhibits no edema or deformity.   Neurological: She is alert and oriented to person, place, and time. She exhibits normal muscle tone. Coordination normal.   Skin: Skin is warm, dry and intact. She is not diaphoretic. No pallor.   Psychiatric: She has a normal mood and affect. Her speech is normal and behavior is normal. Judgment and  thought content normal. Cognition and memory are normal.   Nursing note and vitals reviewed.      X-ray Chest Pa And Lateral    Result Date: 3/11/2019  EXAMINATION: XR CHEST PA AND LATERAL CLINICAL HISTORY: coarse SKY; Shortness of breath TECHNIQUE: PA and lateral views of the chest were performed. COMPARISON: 12/25/2018 FINDINGS: Cardiac silhouette is stable.  There is aortic atherosclerosis.  There are chronic changes including scarring in the right lower lung zone and left upper lung zones.  There has been interval development of areas of consolidation within the right lower lung zone and left mid lung zone concerning for possible pneumonia.  No evidence of pneumothorax or large pleural effusion.  Bones demonstrate severe degenerative changes of the spine and shoulders.     Interval development of areas of consolidation within the right lower lung zone and left mid lung zone concerning for possible pneumonia. This report was flagged in Epic as abnormal. Electronically signed by: Raciel Diane MD Date:    03/11/2019 Time:    16:33    X-ray Hips Bilateral 2 View Incl Ap Pelvis    Result Date: 3/11/2019  EXAMINATION: XR HIPS BILATERAL 2 VIEW INCL AP PELVIS CLINICAL HISTORY: Pain in right hip TECHNIQUE: AP view of the pelvis and frogleg lateral views of both hips were performed. COMPARISON: 08/31/2017 FINDINGS: No evidence of acute fracture, dislocation or bone destruction.  Moderate degenerative changes of the hip joints bilaterally left greater than right.  Severe degenerative changes of the lower lumbar spine.  Calcifications in the pelvis likely calcified fibroids.     Moderate degenerative changes of the hips.  Severe degenerative changes of the visualized portion of the lumbar spine.  No acute bony abnormalities. Electronically signed by: Raciel Diane MD Date:    03/11/2019 Time:    16:35        Assessment:       1. Shortness of breath    2. Chronic pain of both hips    3. Pneumonia of both lungs due to  infectious organism, unspecified part of lung        Plan:         Shortness of breath  -     POCT Influenza A/B  -     IN OFFICE EKG 12-LEAD (to [facility])  -     X-Ray Chest PA And Lateral; Future; Expected date: 03/11/2019    Chronic pain of both hips  -     Cancel: X-Ray Hip 3 or 4 views Bilateral; Future; Expected date: 03/11/2019    Pneumonia of both lungs due to infectious organism, unspecified part of lung  -     levoFLOXacin (LEVAQUIN) 750 MG tablet; Take 1 tablet (750 mg total) by mouth once daily. for 7 days  Dispense: 7 tablet; Refill: 0  -     albuterol (PROVENTIL/VENTOLIN HFA) 90 mcg/actuation inhaler; Inhale 2 puffs into the lungs every 6 (six) hours as needed for Wheezing. Rescue  Dispense: 1 Inhaler; Refill: 0  -     cefTRIAXone injection 1 g    monitored O2 sat when walking 96%.  [place] xray broken - will send to [facility] for xray     Family member requesting xray of bilateral hips while they are at the imaging center as she has been having hip pain and it would be easier for her to have it done now      Addendum: 5pm attempted to call pt with results no answer. 7:30pm called pt again daughter sujey answered, advised pt to RTC for rocephin injection due to pneumonia on xray  Pt arrived back to the clinic for rocephin injection and she was given albuterol treatment here, discussed xray results. Advised f/u with pcp or pulmonology tomorrow or wed, ER for worsening sx. Pt and family in agreement with plan    Patient Instructions   PLEASE READ YOUR DISCHARGE INSTRUCTIONS ENTIRELY AS IT CONTAINS IMPORTANT INFORMATION.    I will call you with the results of your xray    Please go to the emergency room if you experience chest pain, worsening shortness of breath, funny heart beats, headache, blurred vision, weakness in one arm or leg, slurred speech, numbness, inability to walk or talk, confusion.       You must understand that you have received an Urgent Care treatment only and that you may be released before  all of your medical problems are known or treated.    Shortness of Breath (Dyspnea)  Shortness of breath is the feeling that you can't catch your breath or get enough air. It is also known as dyspnea.  Dyspnea can be caused by many different conditions. They include:  · Acute asthma attack.  · Worsening of chronic lung diseases such as chronic bronchitis and emphysema.  · Heart failure. This is when weak heart muscle allows extra fluid to collect in the lungs.  · Panic attacks or anxiety. Fear can cause rapid breathing (hyperventilation).  · Pneumonia, or an infection in the lung tissue.  · Exposure to toxic substances, fumes, smoke, or certain medicines.  · Blood clot in the lung (pulmonary embolism). This is often from a piece of blood clot in a deep vein of the leg (deep vein thrombosis) that breaks off and travels to the lungs.  · Heart attack or heart-related chest pain (angina).  · Anemia.  · Collapsed lung (pneumothorax).  · Dehydration.  · Pregnancy.  Based on your visit today, the exact cause of your shortness of breath is not certain. Your tests dont show any of the serious causes of dyspnea. You may need other tests to find out if you have a serious problem. Its important to watch for any new symptoms or symptoms that get worse. Follow up with your healthcare provider as directed.  Home care  Follow these tips to take care of yourself at home:  · When your symptoms are better, go back to your usual activities.  · If you smoke, you should stop. Join a quit-smoking program or ask your healthcare provider for help.  · Eat a healthy diet and get plenty of sleep.  · Get regular exercise. Talk with your healthcare provider before starting to exercise, especially if you have other medical problems.  · Cut down on the amount of caffeine and stimulants you consume.  Follow-up care  Follow up with your healthcare provider, or as advised.  If tests were done, you will be told if your treatment needs to be changed.  You can call as directed for the results.  (Note: If an X-ray was taken, a specialist will review it. You will be notified of any new findings that may affect your care.)  Call 911 or get immediate medical care  Shortness of breath may be a sign of a serious medical problem. For example, it may be a problem with your heart or lungs. Call 911 if you have worsening shortness of breath or trouble breathing, especially with any of the symptoms below:  · You are confused or its difficult to wake you.  · You faint or lose consciousness.  · You have a fast heartbeat, or your heartbeat is irregular.  · You are coughing up blood.  · You have pain in your chest, arm, shoulder, neck, or upper back.  · You break out in a sweat.  When to seek medical advice  Call your healthcare provider right away if any of these occur:  · Slight shortness of breath or wheezing  · Redness, pain or swelling in your leg, arm, or other body area  · Swelling in both legs or ankles  · Fast weight gain  · Dizziness or weakness  · Fever of 100.4ºF (38ºC) or higher, or as directed by your healthcare provider  Date Last Reviewed: 9/13/2015  © 2940-9734 [employer]. [address] [place] [address], [place] [address]. All rights reserved. This information is not intended as a substitute for professional medical care. Always follow your healthcare professional's instructions.

## 2019-03-11 NOTE — PATIENT INSTRUCTIONS
PLEASE READ YOUR DISCHARGE INSTRUCTIONS ENTIRELY AS IT CONTAINS IMPORTANT INFORMATION.    I will call you with the results of your xray    Please go to the emergency room if you experience chest pain, worsening shortness of breath, funny heart beats, headache, blurred vision, weakness in one arm or leg, slurred speech, numbness, inability to walk or talk, confusion.       You must understand that you have received an Urgent Care treatment only and that you may be released before all of your medical problems are known or treated.    Shortness of Breath (Dyspnea)  Shortness of breath is the feeling that you can't catch your breath or get enough air. It is also known as dyspnea.  Dyspnea can be caused by many different conditions. They include:  · Acute asthma attack.  · Worsening of chronic lung diseases such as chronic bronchitis and emphysema.  · Heart failure. This is when weak heart muscle allows extra fluid to collect in the lungs.  · Panic attacks or anxiety. Fear can cause rapid breathing (hyperventilation).  · Pneumonia, or an infection in the lung tissue.  · Exposure to toxic substances, fumes, smoke, or certain medicines.  · Blood clot in the lung (pulmonary embolism). This is often from a piece of blood clot in a deep vein of the leg (deep vein thrombosis) that breaks off and travels to the lungs.  · Heart attack or heart-related chest pain (angina).  · Anemia.  · Collapsed lung (pneumothorax).  · Dehydration.  · Pregnancy.  Based on your visit today, the exact cause of your shortness of breath is not certain. Your tests dont show any of the serious causes of dyspnea. You may need other tests to find out if you have a serious problem. Its important to watch for any new symptoms or symptoms that get worse. Follow up with your healthcare provider as directed.  Home care  Follow these tips to take care of yourself at home:  · When your symptoms are better, go back to your usual activities.  · If you smoke,  you should stop. Join a quit-smoking program or ask your healthcare provider for help.  · Eat a healthy diet and get plenty of sleep.  · Get regular exercise. Talk with your healthcare provider before starting to exercise, especially if you have other medical problems.  · Cut down on the amount of caffeine and stimulants you consume.  Follow-up care  Follow up with your healthcare provider, or as advised.  If tests were done, you will be told if your treatment needs to be changed. You can call as directed for the results.  (Note: If an X-ray was taken, a specialist will review it. You will be notified of any new findings that may affect your care.)  Call 911 or get immediate medical care  Shortness of breath may be a sign of a serious medical problem. For example, it may be a problem with your heart or lungs. Call 911 if you have worsening shortness of breath or trouble breathing, especially with any of the symptoms below:  · You are confused or its difficult to wake you.  · You faint or lose consciousness.  · You have a fast heartbeat, or your heartbeat is irregular.  · You are coughing up blood.  · You have pain in your chest, arm, shoulder, neck, or upper back.  · You break out in a sweat.  When to seek medical advice  Call your healthcare provider right away if any of these occur:  · Slight shortness of breath or wheezing  · Redness, pain or swelling in your leg, arm, or other body area  · Swelling in both legs or ankles  · Fast weight gain  · Dizziness or weakness  · Fever of 100.4ºF (38ºC) or higher, or as directed by your healthcare provider  Date Last Reviewed: 9/13/2015 © 2000-2017 [employer]. [address] [place] [address]. All rights reserved. This information is not intended as a substitute for professional medical care. Always follow your healthcare professional's instructions.

## 2019-03-12 ENCOUNTER — TELEPHONE (OUTPATIENT)
Dept: PULMONOLOGY | Facility: CLINIC | Age: 84
End: 2019-03-12

## 2019-03-12 NOTE — TELEPHONE ENCOUNTER
Spoke with patient daughter Rosy, she stated that she didn't care which provider Dr. Lux seen she just needed her to be seen on tomorrow. She stated that she was already aware that Dr. Francis would not be in on tomorrow. I offered an 10:30am appointment with Dr. Meraz on 03/13 and it was accepted.

## 2019-03-12 NOTE — TELEPHONE ENCOUNTER
----- Message from Charlette Suazo sent at 3/12/2019  3:12 PM CDT -----  Contact: Rosy / Daughter [phone]  PT has pneumonia and is requesting an appointment for tomorrow. Rosy is also requesting to have the xray that was done at Urgent Care reviewed.

## 2019-03-13 ENCOUNTER — OFFICE VISIT (OUTPATIENT)
Dept: PULMONOLOGY | Facility: CLINIC | Age: 84
DRG: 196 | End: 2019-03-13
Payer: MEDICARE

## 2019-03-13 ENCOUNTER — HOSPITAL ENCOUNTER (INPATIENT)
Facility: HOSPITAL | Age: 84
LOS: 5 days | Discharge: HOME HEALTH | DRG: 196 | End: 2019-03-18
Attending: EMERGENCY MEDICINE | Admitting: EMERGENCY MEDICINE
Payer: MEDICARE

## 2019-03-13 VITALS
SYSTOLIC BLOOD PRESSURE: 100 MMHG | HEIGHT: 63 IN | WEIGHT: 164.44 LBS | OXYGEN SATURATION: 88 % | HEART RATE: 95 BPM | BODY MASS INDEX: 29.14 KG/M2 | DIASTOLIC BLOOD PRESSURE: 68 MMHG

## 2019-03-13 DIAGNOSIS — J96.01 ACUTE HYPOXEMIC RESPIRATORY FAILURE: ICD-10-CM

## 2019-03-13 DIAGNOSIS — N17.9 AKI (ACUTE KIDNEY INJURY): ICD-10-CM

## 2019-03-13 DIAGNOSIS — J84.116 CRYPTOGENIC ORGANIZING PNEUMONIA: Primary | ICD-10-CM

## 2019-03-13 DIAGNOSIS — J18.9 CAP (COMMUNITY ACQUIRED PNEUMONIA): ICD-10-CM

## 2019-03-13 DIAGNOSIS — Z79.60 LONG-TERM USE OF IMMUNOSUPPRESSANT MEDICATION: ICD-10-CM

## 2019-03-13 DIAGNOSIS — D72.829 LEUKOCYTOSIS, UNSPECIFIED TYPE: ICD-10-CM

## 2019-03-13 DIAGNOSIS — J84.9 PULMONARY HYPERTENSION DUE TO INTERSTITIAL LUNG DISEASE: ICD-10-CM

## 2019-03-13 DIAGNOSIS — Z86.73 HISTORY OF STROKE: ICD-10-CM

## 2019-03-13 DIAGNOSIS — I27.23 PULMONARY HYPERTENSION DUE TO INTERSTITIAL LUNG DISEASE: ICD-10-CM

## 2019-03-13 DIAGNOSIS — J84.89 INTERSTITIAL LUNG DISEASE DUE TO CONNECTIVE TISSUE DISEASE: ICD-10-CM

## 2019-03-13 DIAGNOSIS — I10 ESSENTIAL HYPERTENSION: ICD-10-CM

## 2019-03-13 DIAGNOSIS — M05.79 SEROPOSITIVE RHEUMATOID ARTHRITIS OF MULTIPLE SITES: ICD-10-CM

## 2019-03-13 DIAGNOSIS — J18.9 PNEUMONIA OF BOTH LUNGS DUE TO INFECTIOUS ORGANISM, UNSPECIFIED PART OF LUNG: ICD-10-CM

## 2019-03-13 DIAGNOSIS — M35.9 INTERSTITIAL LUNG DISEASE DUE TO CONNECTIVE TISSUE DISEASE: ICD-10-CM

## 2019-03-13 DIAGNOSIS — G24.9 DYSTONIA: ICD-10-CM

## 2019-03-13 LAB
ALLENS TEST: ABNORMAL
ALLENS TEST: ABNORMAL
ANION GAP SERPL CALC-SCNC: 12 MMOL/L
ANISOCYTOSIS BLD QL SMEAR: SLIGHT
BASOPHILS # BLD AUTO: 0.03 K/UL
BASOPHILS NFR BLD: 0.2 %
BILIRUB UR QL STRIP: NEGATIVE
BNP SERPL-MCNC: 97 PG/ML
BUN SERPL-MCNC: 19 MG/DL
CALCIUM SERPL-MCNC: 9.9 MG/DL
CHLORIDE SERPL-SCNC: 99 MMOL/L
CLARITY UR REFRACT.AUTO: CLEAR
CO2 SERPL-SCNC: 23 MMOL/L
COLOR UR AUTO: YELLOW
CREAT SERPL-MCNC: 1.5 MG/DL
CTP QC/QA: YES
DIFFERENTIAL METHOD: ABNORMAL
EOSINOPHIL # BLD AUTO: 0.1 K/UL
EOSINOPHIL NFR BLD: 0.6 %
ERYTHROCYTE [DISTWIDTH] IN BLOOD BY AUTOMATED COUNT: 14.2 %
EST. GFR  (AFRICAN AMERICAN): 37.4 ML/MIN/1.73 M^2
EST. GFR  (NON AFRICAN AMERICAN): 32.4 ML/MIN/1.73 M^2
ESTIMATED AVG GLUCOSE: 117 MG/DL
GLUCOSE SERPL-MCNC: 157 MG/DL
GLUCOSE UR QL STRIP: NEGATIVE
HBA1C MFR BLD HPLC: 5.7 %
HCO3 UR-SCNC: 20.1 MMOL/L (ref 24–28)
HCO3 UR-SCNC: 20.1 MMOL/L (ref 24–28)
HCT VFR BLD AUTO: 38.4 %
HGB BLD-MCNC: 12.1 G/DL
HGB UR QL STRIP: NEGATIVE
HYPOCHROMIA BLD QL SMEAR: ABNORMAL
IMM GRANULOCYTES # BLD AUTO: 0.08 K/UL
IMM GRANULOCYTES NFR BLD AUTO: 0.6 %
INR PPP: 1.2
KETONES UR QL STRIP: NEGATIVE
LACTATE SERPL-SCNC: 0.9 MMOL/L
LEUKOCYTE ESTERASE UR QL STRIP: NEGATIVE
LYMPHOCYTES # BLD AUTO: 1.5 K/UL
LYMPHOCYTES NFR BLD: 10.2 %
MCH RBC QN AUTO: 27.1 PG
MCHC RBC AUTO-ENTMCNC: 31.5 G/DL
MCV RBC AUTO: 86 FL
MONOCYTES # BLD AUTO: 3 K/UL
MONOCYTES NFR BLD: 21.2 %
NEUTROPHILS # BLD AUTO: 9.7 K/UL
NEUTROPHILS NFR BLD: 67.2 %
NITRITE UR QL STRIP: NEGATIVE
NRBC BLD-RTO: 0 /100 WBC
OVALOCYTES BLD QL SMEAR: ABNORMAL
PCO2 BLDA: 35.3 MMHG (ref 35–45)
PCO2 BLDA: 35.3 MMHG (ref 35–45)
PH SMN: 7.36 [PH] (ref 7.35–7.45)
PH SMN: 7.36 [PH] (ref 7.35–7.45)
PH UR STRIP: 5 [PH] (ref 5–8)
PLATELET # BLD AUTO: 269 K/UL
PMV BLD AUTO: 9.6 FL
PO2 BLDA: 27 MMHG (ref 40–60)
PO2 BLDA: 27 MMHG (ref 40–60)
POC BE: -5 MMOL/L
POC BE: -5 MMOL/L
POC MOLECULAR INFLUENZA A AGN: NEGATIVE
POC MOLECULAR INFLUENZA B AGN: NEGATIVE
POC SATURATED O2: 49 % (ref 95–100)
POC SATURATED O2: 49 % (ref 95–100)
POC TCO2: 21 MMOL/L (ref 24–29)
POC TCO2: 21 MMOL/L (ref 24–29)
POIKILOCYTOSIS BLD QL SMEAR: SLIGHT
POLYCHROMASIA BLD QL SMEAR: ABNORMAL
POTASSIUM SERPL-SCNC: 4.5 MMOL/L
PROCALCITONIN SERPL IA-MCNC: 0.29 NG/ML
PROT UR QL STRIP: NEGATIVE
PROTHROMBIN TIME: 11.9 SEC
RBC # BLD AUTO: 4.46 M/UL
SAMPLE: ABNORMAL
SAMPLE: ABNORMAL
SITE: ABNORMAL
SITE: ABNORMAL
SODIUM SERPL-SCNC: 134 MMOL/L
SP GR UR STRIP: 1.01 (ref 1–1.03)
TSH SERPL DL<=0.005 MIU/L-ACNC: 1.83 UIU/ML
URN SPEC COLLECT METH UR: NORMAL
WBC # BLD AUTO: 14.37 K/UL

## 2019-03-13 PROCEDURE — 82803 BLOOD GASES ANY COMBINATION: CPT

## 2019-03-13 PROCEDURE — 96365 THER/PROPH/DIAG IV INF INIT: CPT

## 2019-03-13 PROCEDURE — 99999 PR PBB SHADOW E&M-EST. PATIENT-LVL III: ICD-10-PCS | Mod: PBBFAC,,, | Performed by: EMERGENCY MEDICINE

## 2019-03-13 PROCEDURE — 83605 ASSAY OF LACTIC ACID: CPT

## 2019-03-13 PROCEDURE — 25000003 PHARM REV CODE 250: Performed by: EMERGENCY MEDICINE

## 2019-03-13 PROCEDURE — 83880 ASSAY OF NATRIURETIC PEPTIDE: CPT

## 2019-03-13 PROCEDURE — 94761 N-INVAS EAR/PLS OXIMETRY MLT: CPT

## 2019-03-13 PROCEDURE — 80048 BASIC METABOLIC PNL TOTAL CA: CPT

## 2019-03-13 PROCEDURE — 99285 PR EMERGENCY DEPT VISIT,LEVEL V: ICD-10-PCS | Mod: ,,, | Performed by: EMERGENCY MEDICINE

## 2019-03-13 PROCEDURE — 96361 HYDRATE IV INFUSION ADD-ON: CPT | Mod: 59

## 2019-03-13 PROCEDURE — 99999 PR PBB SHADOW E&M-EST. PATIENT-LVL III: CPT | Mod: PBBFAC,,, | Performed by: EMERGENCY MEDICINE

## 2019-03-13 PROCEDURE — 93005 ELECTROCARDIOGRAM TRACING: CPT

## 2019-03-13 PROCEDURE — 96366 THER/PROPH/DIAG IV INF ADDON: CPT

## 2019-03-13 PROCEDURE — 25000242 PHARM REV CODE 250 ALT 637 W/ HCPCS: Performed by: INTERNAL MEDICINE

## 2019-03-13 PROCEDURE — 99285 EMERGENCY DEPT VISIT HI MDM: CPT | Mod: 25,27

## 2019-03-13 PROCEDURE — 93010 ELECTROCARDIOGRAM REPORT: CPT | Mod: ,,, | Performed by: INTERNAL MEDICINE

## 2019-03-13 PROCEDURE — 99213 PR OFFICE/OUTPT VISIT, EST, LEVL III, 20-29 MIN: ICD-10-PCS | Mod: S$PBB,,, | Performed by: EMERGENCY MEDICINE

## 2019-03-13 PROCEDURE — 99285 EMERGENCY DEPT VISIT HI MDM: CPT | Mod: ,,, | Performed by: EMERGENCY MEDICINE

## 2019-03-13 PROCEDURE — 87040 BLOOD CULTURE FOR BACTERIA: CPT | Mod: 59

## 2019-03-13 PROCEDURE — 81003 URINALYSIS AUTO W/O SCOPE: CPT

## 2019-03-13 PROCEDURE — 96375 TX/PRO/DX INJ NEW DRUG ADDON: CPT

## 2019-03-13 PROCEDURE — 85610 PROTHROMBIN TIME: CPT

## 2019-03-13 PROCEDURE — 84145 PROCALCITONIN (PCT): CPT

## 2019-03-13 PROCEDURE — 99213 OFFICE O/P EST LOW 20 MIN: CPT | Mod: S$PBB,,, | Performed by: EMERGENCY MEDICINE

## 2019-03-13 PROCEDURE — 96360 HYDRATION IV INFUSION INIT: CPT | Mod: 59

## 2019-03-13 PROCEDURE — 94640 AIRWAY INHALATION TREATMENT: CPT

## 2019-03-13 PROCEDURE — 99213 OFFICE O/P EST LOW 20 MIN: CPT | Mod: PBBFAC,25 | Performed by: EMERGENCY MEDICINE

## 2019-03-13 PROCEDURE — 83036 HEMOGLOBIN GLYCOSYLATED A1C: CPT

## 2019-03-13 PROCEDURE — 93010 EKG 12-LEAD: ICD-10-PCS | Mod: ,,, | Performed by: INTERNAL MEDICINE

## 2019-03-13 PROCEDURE — 20600001 HC STEP DOWN PRIVATE ROOM

## 2019-03-13 PROCEDURE — 96367 TX/PROPH/DG ADDL SEQ IV INF: CPT

## 2019-03-13 PROCEDURE — 99223 1ST HOSP IP/OBS HIGH 75: CPT | Mod: AI,GC,, | Performed by: INTERNAL MEDICINE

## 2019-03-13 PROCEDURE — 99223 PR INITIAL HOSPITAL CARE,LEVL III: ICD-10-PCS | Mod: AI,GC,, | Performed by: INTERNAL MEDICINE

## 2019-03-13 PROCEDURE — 87632 RESP VIRUS 6-11 TARGETS: CPT

## 2019-03-13 PROCEDURE — 84443 ASSAY THYROID STIM HORMONE: CPT

## 2019-03-13 PROCEDURE — 85025 COMPLETE CBC W/AUTO DIFF WBC: CPT

## 2019-03-13 PROCEDURE — 63600175 PHARM REV CODE 636 W HCPCS: Performed by: EMERGENCY MEDICINE

## 2019-03-13 PROCEDURE — 25000003 PHARM REV CODE 250: Performed by: INTERNAL MEDICINE

## 2019-03-13 PROCEDURE — 12000002 HC ACUTE/MED SURGE SEMI-PRIVATE ROOM

## 2019-03-13 RX ORDER — PANTOPRAZOLE SODIUM 40 MG/1
40 TABLET, DELAYED RELEASE ORAL DAILY
Status: DISCONTINUED | OUTPATIENT
Start: 2019-03-14 | End: 2019-03-18 | Stop reason: HOSPADM

## 2019-03-13 RX ORDER — CLOPIDOGREL BISULFATE 75 MG/1
75 TABLET ORAL DAILY
Status: DISCONTINUED | OUTPATIENT
Start: 2019-03-14 | End: 2019-03-18 | Stop reason: HOSPADM

## 2019-03-13 RX ORDER — CARVEDILOL 12.5 MG/1
12.5 TABLET ORAL 2 TIMES DAILY WITH MEALS
Status: DISCONTINUED | OUTPATIENT
Start: 2019-03-13 | End: 2019-03-18 | Stop reason: HOSPADM

## 2019-03-13 RX ORDER — ONDANSETRON 8 MG/1
8 TABLET, ORALLY DISINTEGRATING ORAL EVERY 8 HOURS PRN
Status: DISCONTINUED | OUTPATIENT
Start: 2019-03-13 | End: 2019-03-18 | Stop reason: HOSPADM

## 2019-03-13 RX ORDER — HYDROXYCHLOROQUINE SULFATE 200 MG/1
200 TABLET, FILM COATED ORAL 2 TIMES DAILY
Status: DISCONTINUED | OUTPATIENT
Start: 2019-03-13 | End: 2019-03-18 | Stop reason: HOSPADM

## 2019-03-13 RX ORDER — IPRATROPIUM BROMIDE AND ALBUTEROL SULFATE 2.5; .5 MG/3ML; MG/3ML
3 SOLUTION RESPIRATORY (INHALATION)
Status: DISCONTINUED | OUTPATIENT
Start: 2019-03-13 | End: 2019-03-17

## 2019-03-13 RX ORDER — ASPIRIN 81 MG/1
81 TABLET ORAL DAILY
Status: DISCONTINUED | OUTPATIENT
Start: 2019-03-14 | End: 2019-03-18 | Stop reason: HOSPADM

## 2019-03-13 RX ORDER — OXYCODONE HYDROCHLORIDE 5 MG/1
5 TABLET ORAL EVERY 6 HOURS PRN
Status: DISCONTINUED | OUTPATIENT
Start: 2019-03-13 | End: 2019-03-14

## 2019-03-13 RX ORDER — METHYLPREDNISOLONE SOD SUCC 125 MG
80 VIAL (EA) INJECTION DAILY
Status: DISCONTINUED | OUTPATIENT
Start: 2019-03-14 | End: 2019-03-14

## 2019-03-13 RX ORDER — METHYLPREDNISOLONE SOD SUCC 125 MG
125 VIAL (EA) INJECTION
Status: COMPLETED | OUTPATIENT
Start: 2019-03-13 | End: 2019-03-13

## 2019-03-13 RX ORDER — HEPARIN SODIUM 5000 [USP'U]/ML
5000 INJECTION, SOLUTION INTRAVENOUS; SUBCUTANEOUS EVERY 8 HOURS
Status: DISCONTINUED | OUTPATIENT
Start: 2019-03-13 | End: 2019-03-18 | Stop reason: HOSPADM

## 2019-03-13 RX ORDER — IBUPROFEN 200 MG
16 TABLET ORAL
Status: DISCONTINUED | OUTPATIENT
Start: 2019-03-13 | End: 2019-03-18 | Stop reason: HOSPADM

## 2019-03-13 RX ORDER — VANCOMYCIN HCL IN 5 % DEXTROSE 1G/250ML
1000 PLASTIC BAG, INJECTION (ML) INTRAVENOUS
Status: DISCONTINUED | OUTPATIENT
Start: 2019-03-14 | End: 2019-03-14

## 2019-03-13 RX ORDER — VANCOMYCIN HCL IN 5 % DEXTROSE 1.5G/250ML
20 PLASTIC BAG, INJECTION (ML) INTRAVENOUS
Status: COMPLETED | OUTPATIENT
Start: 2019-03-13 | End: 2019-03-13

## 2019-03-13 RX ORDER — SODIUM CHLORIDE 0.9 % (FLUSH) 0.9 %
5 SYRINGE (ML) INJECTION
Status: DISCONTINUED | OUTPATIENT
Start: 2019-03-13 | End: 2019-03-14

## 2019-03-13 RX ORDER — GLUCAGON 1 MG
1 KIT INJECTION
Status: DISCONTINUED | OUTPATIENT
Start: 2019-03-13 | End: 2019-03-18 | Stop reason: HOSPADM

## 2019-03-13 RX ORDER — ACETAMINOPHEN 325 MG/1
650 TABLET ORAL EVERY 4 HOURS PRN
Status: DISCONTINUED | OUTPATIENT
Start: 2019-03-13 | End: 2019-03-18 | Stop reason: HOSPADM

## 2019-03-13 RX ORDER — INSULIN ASPART 100 [IU]/ML
0-5 INJECTION, SOLUTION INTRAVENOUS; SUBCUTANEOUS
Status: DISCONTINUED | OUTPATIENT
Start: 2019-03-13 | End: 2019-03-18 | Stop reason: HOSPADM

## 2019-03-13 RX ORDER — IBUPROFEN 200 MG
24 TABLET ORAL
Status: DISCONTINUED | OUTPATIENT
Start: 2019-03-13 | End: 2019-03-18 | Stop reason: HOSPADM

## 2019-03-13 RX ORDER — PREDNISONE 20 MG/1
40 TABLET ORAL DAILY
Qty: 30 TABLET | Refills: 1 | Status: ON HOLD | OUTPATIENT
Start: 2019-03-13 | End: 2019-03-14 | Stop reason: HOSPADM

## 2019-03-13 RX ORDER — GABAPENTIN 300 MG/1
300 CAPSULE ORAL NIGHTLY
Status: DISCONTINUED | OUTPATIENT
Start: 2019-03-13 | End: 2019-03-18 | Stop reason: HOSPADM

## 2019-03-13 RX ORDER — MONTELUKAST SODIUM 10 MG/1
10 TABLET ORAL NIGHTLY
Status: DISCONTINUED | OUTPATIENT
Start: 2019-03-13 | End: 2019-03-18 | Stop reason: HOSPADM

## 2019-03-13 RX ADMIN — SODIUM CHLORIDE 1000 ML: 0.9 INJECTION, SOLUTION INTRAVENOUS at 05:03

## 2019-03-13 RX ADMIN — HYDROXYCHLOROQUINE SULFATE 200 MG: 200 TABLET, FILM COATED ORAL at 08:03

## 2019-03-13 RX ADMIN — PIPERACILLIN AND TAZOBACTAM 4.5 G: 4; .5 INJECTION, POWDER, LYOPHILIZED, FOR SOLUTION INTRAVENOUS; PARENTERAL at 01:03

## 2019-03-13 RX ADMIN — IPRATROPIUM BROMIDE AND ALBUTEROL SULFATE 3 ML: .5; 3 SOLUTION RESPIRATORY (INHALATION) at 08:03

## 2019-03-13 RX ADMIN — VANCOMYCIN HYDROCHLORIDE 1500 MG: 100 INJECTION, POWDER, LYOPHILIZED, FOR SOLUTION INTRAVENOUS at 03:03

## 2019-03-13 RX ADMIN — CARVEDILOL 12.5 MG: 12.5 TABLET, FILM COATED ORAL at 05:03

## 2019-03-13 RX ADMIN — MONTELUKAST SODIUM 10 MG: 10 TABLET, FILM COATED ORAL at 08:03

## 2019-03-13 RX ADMIN — GABAPENTIN 300 MG: 300 CAPSULE ORAL at 08:03

## 2019-03-13 RX ADMIN — IPRATROPIUM BROMIDE AND ALBUTEROL SULFATE 3 ML: .5; 3 SOLUTION RESPIRATORY (INHALATION) at 04:03

## 2019-03-13 RX ADMIN — METHYLPREDNISOLONE SODIUM SUCCINATE 125 MG: 125 INJECTION, POWDER, FOR SOLUTION INTRAMUSCULAR; INTRAVENOUS at 01:03

## 2019-03-13 NOTE — PROGRESS NOTES
"    Pulmonary & Critical Care Medicine   Clinic Note    HPI: Dr. Lux is well known to me from previous ICU admissions. 84 y/o female with seropositive RA on Plaquenil, sulfasalazine, prednisone. Had prolonged ICU admission April/October attributed to presumed  secondary to RA. After 1 month ICU admission she subsequently returned in Dec 2018 for viral encephalitis (unrevealing w/u)..  Has completed rehab and last evaluated in pulmonary clinic 2/4 (Dr. Francis).. In summarizing prior assessment she was on RA and clinically improving from respiratory standpoint on prednisone taper driven by rheumatology..       Interval History- Has continued with prednisone taper. Decreased to 10mg about 1 week prior. At that time had increase SOB/generalized weakness. Seen at [facility] [place] on 3/11. Chest imaging with new airspace opacities. Initiated on Levaquin and instructed to follow up in clinic. This AM, she still feels weak. Positive cough intermittently without sputum production/hemoptysis. +dyspnea. Unable to work with PT secondary to precipitous desaturation and has essentially remained wheelchair bound. Daughter increased prednisone back to 15mg one day prior as she always "gets worse" when reduced <15mg.. Denies any F/C or additional constitutional features. No recent sick contacts or exposures.. No URI features.     SpO2 85-88% at rest in wheelchair.     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 "    Hypertension     Hypertensive encephalopathy     IBS (irritable bowel syndrome) 6/21/2017    IGT (impaired glucose tolerance) 1/12/2016    Iron deficiency anemia secondary to inadequate dietary iron intake 6/24/2013    Joint pain     Keratoconjunctivitis sicca of both eyes not specified as Sjogren's 7/29/2016    Leiomyoma of uterus, unspecified 9/16/2014    Long QT interval 6/29/2016    Due to medication (plaquenil)     Macular edema 1/12/2015    Multinodular goiter 1/12/2016    Neuropathy 8/23/2017    Plaquenil causing adverse effect in therapeutic use 10/7/2016    Pneumococcal vaccine refused 8/17/2016    Pneumonia due to Streptococcus pneumoniae 4/5/2018    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/2015    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    s/p Right Total knee replacement 5/15/2013    Scoliosis of thoracic spine 5/15/2013    Small vessel disease, cerebrovascular 12/28/2017    Stroke     UTI (urinary tract infection) 12/29/2018    Vascular dementia 12/6/2017     Past Surgical History:   Procedure Laterality Date    BREAST CYST EXCISION      CATARACT EXTRACTION      COLONOSCOPY N/A 9/29/2015    Performed by FIDELINA Sanchez MD at [facility] ENDO (4TH FLR)    EGD (ESOPHAGOGASTRODUODENOSCOPY) N/A 3/11/2014    Performed by Federico Escobar MD at [facility] ENDO (4TH FLR)    EGD (ESOPHAGOGASTRODUODENOSCOPY) N/A 11/5/2013    Performed by Federico Escobar MD at [facility] ENDO (4TH FLR)    ESOPHAGOGASTRODUODENOSCOPY (EGD) N/A 10/4/2017    Performed by Mg Morton [facility] at [facility] ENDO    EYE SURGERY      INJECTION-STEROID-EPIDURAL-TRANSFORAMINAL Right 9/20/2017    Performed by [facility]elin Valdez [facility] at [facility] PAIN MGT    JOINT REPLACEMENT      right knee    KNEE SURGERY Left 12/31/2013    TKR    left parotidectomy      mixed tumor    SALIVARY GLAND SURGERY      TONSILLECTOMY       Social History:   Social History     Socioeconomic History    Marital status:      Spouse name: Not on  file    Number of children: Not on file    Years of education: Not on file    Highest education level: Not on file   Social Needs    Financial resource strain: Not on file    Food insecurity - worry: Not on file    Food insecurity - inability: Not on file    Transportation needs - medical: Not on file    Transportation needs - non-medical: Not on file   Occupational History    Not on file   Tobacco Use    Smoking status: Never Smoker    Smokeless tobacco: Never Used   Substance and Sexual Activity    Alcohol use: No     Alcohol/week: 0.0 oz     Comment: very seldom     Drug use: No    Sexual activity: No     Comment: ,  age 52,    Other Topics Concern    Are you pregnant or think you may be? No    Breast-feeding No   Social History Narrative    Not on file     Family History   Problem Relation Age of Onset    Hypertension Mother     Heart disease Mother     Prostate cancer Father         prostate cancer    Cancer Father     Breast cancer Maternal Grandmother     Lupus Neg Hx     Psoriasis Neg Hx     Melanoma Neg Hx     Colon cancer Neg Hx      Drug Allergies:   No Known Allergies      Scheduled Medications:     onabotulinumtoxina  200 Units Intramuscular Q90 Days    onabotulinumtoxina  200 Units Intramuscular Q90 Days    onabotulinumtoxina  200 Units Intramuscular Q90 Days       Review of Systems:     Constitutional: Negative for fever, chills, diaphoresis. +weakness, fatigue.   HENT: Negative for congestion, drooling, facial swelling, hearing loss, rhinorrhea, sinus pressure, tinnitus, trouble swallowing and voice change.    Eyes: Negative for photophobia, pain and visual disturbance.   Respiratory: Positive per HPI    Cardiovascular: Negative for chest pain, palpitations and leg swelling.   Gastrointestinal: Negative for nausea, vomiting, abdominal pain, diarrhea and abdominal distention.    Genitourinary: Negative for dysuria, frequency and hematuria.   Musculoskeletal:  "Negative for myalgias, back pain. Positive arthralgias. Positive chronic neck pain (recieves injection with neuro)    Skin: Negative for color change, pallor, rash and wound.   Neurological: Negative for dizziness, weakness and headaches.    A comprehensive 12-point review of systems was performed, and is negative except for those items mentioned above in the HPI section of this note.     Vital Signs:    /68 (BP Location: Left arm, Patient Position: Sitting)   Pulse 95   Ht 5' 3" (1.6 m)   Wt 74.6 kg (164 lb 7.4 oz)   LMP  (LMP Unknown)   SpO2 (!) 88%   BMI 29.13 kg/m²      Physical Exam:   GEN- Frail, elderly.  AAOx3 Not in distress. Appears weak   HEENT- ATNC, PERRLA, EOMI, OP-Cl. No JVD, LAD or bruit noted. Trachea Midline.   CV- RRR No M/R/G  RESP- Posterior crackles. No wheezing.   GI- S/NT/ND. Positive BS X 4. No HSM Noted  BACK- Spine midline. No step off, crepitus or deformity noted. No midline TTP.   Ext- MAEW, No edema or rashes noted. No LE asymmetry     Personal Review and Summary of Prior Diagnostics    Laboratory Studies: Reviewed       TSH 0.400 - 4.000 uIU/mL 0.775      Sodium 136 - 145 mmol/L 139    Potassium 3.5 - 5.1 mmol/L 4.9    Chloride 95 - 110 mmol/L 103    CO2 23 - 29 mmol/L 26    Glucose 70 - 110 mg/dL 120 Abnormally high     BUN, Bld 8 - 23 mg/dL 23    Creatinine 0.5 - 1.4 mg/dL 1.1    Calcium 8.7 - 10.5 mg/dL 9.4    Anion Gap 8 - 16 mmol/L 10      C 3.90 - 12.70 K/uL 9.40    RBC 4.00 - 5.40 M/uL 4.81    Hemoglobin 12.0 - 16.0 g/dL 12.9    Hematocrit 37.0 - 48.5 % 44.4    MCV 82 - 98 fL 92    MCH 27.0 - 31.0 pg 26.8 Abnormally low     MCHC 32.0 - 36.0 g/dL 29.1 Abnormally low     RDW 11.5 - 14.5 % 14.6 Abnormally high     Platelets 150 - 350 K/uL 205    MPV 9.2 - 12.9 fL 10.7    Immature Granulocytes 0.0 - 0.5 % 1.4 Abnormally high     Gran # (ANC) 1.8 - 7.7 K/uL 6.6    Immature Grans (Abs) 0.00 - 0.04 K/uL 0.13 Abnormally high     Comment: Mild elevation in immature " granulocytes is non specific and   can be seen in a variety of conditions including stress response,   acute inflammation, trauma and pregnancy. Correlation with other   laboratory and clinical findings is essential.    Lymph # 1.0 - 4.8 K/uL 1.4    Mono # 0.3 - 1.0 K/uL 1.2 Abnormally high     Eos # 0.0 - 0.5 K/uL 0.1    Baso # 0.00 - 0.20 K/uL 0.01    nRBC 0 /100 WBC 0    Gran% 38.0 - 73.0 % 70.0    Lymph% 18.0 - 48.0 % 14.5 Abnormally low     Mono% 4.0 - 15.0 % 12.8    Eosinophil% 0.0 - 8.0 % 1.2    Basophil% 0.0 - 1.9 % 0.1            Microbiology Data:   Rapid Flu 3/11- Negative     Summary of Chest Imaging Personally Reviewed:     CXR 3/11/2019   Interval development of areas of consolidation within the right lower lung zone and left mid lung zone     CXR 12/2018  The mediastinal structures are midline.  The cardiac silhouette is not enlarged.  Left upper lung zone subsegmental atelectasis versus consolidation is unchanged.  A few areas of subsegmental atelectasis are also noted in the right lung.    Additional chest imaging reviewed.     2D Echo: (10/2018)      1 - Severe left atrial enlargement.     2 - Concentric hypertrophy.     3 - Normal left ventricular systolic function (EF 60-65%).     4 - No wall motion abnormalities.     5 - Indeterminate LV diastolic function.     6 - Normal right ventricular systolic function .     7 - Moderate mitral regurgitation.     8 - The estimated PA systolic pressure is 27 mmHg.     9 - Trivial pericardial effusion.    PFT's: None         Assessment:       No diagnosis found.    Outpatient Encounter Medications as of 3/13/2019   Medication Sig Dispense Refill    acetaminophen (TYLENOL) 500 MG tablet Take 1,000 mg by mouth daily as needed for Pain.      albuterol (PROVENTIL/VENTOLIN HFA) 90 mcg/actuation inhaler Inhale 2 puffs into the lungs every 6 (six) hours as needed for Wheezing. Rescue 1 Inhaler 0    aspirin (ECOTRIN) 81 MG EC tablet Take 81 mg by mouth once daily.       carvedilol (COREG) 12.5 MG tablet Take 1 tablet (12.5 mg total) by mouth 2 (two) times daily with meals. 180 tablet 3    ciclopirox (PENLAC) 8 % Soln Apply to affected nails qhs. Clean nails with alcohol q7 days. 1 Bottle 11    clopidogrel (PLAVIX) 75 mg tablet TAKE 1 TABLET DAILY 90 tablet 1    diazePAM (VALIUM) 2 MG tablet Take 1 tablet (2 mg total) by mouth as needed for Anxiety. (Patient taking differently: Take 2 mg by mouth every evening. ) 30 tablet 5    furosemide (LASIX) 20 MG tablet Take 1 tablet (20 mg total) by mouth once daily.      gabapentin (NEURONTIN) 300 MG capsule Take 1 capsule (300 mg total) by mouth every evening. 90 capsule 2    hydroxychloroquine (PLAQUENIL) 200 mg tablet Take 1 tablet (200 mg total) by mouth 2 (two) times daily. 60 tablet 2    levoFLOXacin (LEVAQUIN) 750 MG tablet Take 1 tablet (750 mg total) by mouth once daily. for 7 days 7 tablet 0    linaclotide (LINZESS) 145 mcg Cap capsule Take 1 capsule (145 mcg total) by mouth once daily. 30 capsule 0    magnesium oxide (MAG-OX) 400 mg tablet Take 400 mg by mouth once daily.      montelukast (SINGULAIR) 10 mg tablet Take 1 tablet (10 mg total) by mouth every evening. 90 tablet 3    omeprazole (PRILOSEC) 20 MG capsule Take 1 capsule (20 mg total) by mouth once daily. 90 capsule 0    potassium chloride SA (K-DUR,KLOR-CON) 10 MEQ tablet Take 2 tablets (20 mEq total) by mouth once daily. 60 tablet 3    predniSONE (DELTASONE) 5 MG tablet Take 2.5 tablets (12.5 mg total) by mouth once daily. Taper as advised 90 tablet 2    sulfaSALAzine (AZULFIDINE) 500 MG TbEC Take 2 tablets (1,000 mg total) by mouth 2 (two) times daily. 120 tablet 2    traMADol (ULTRAM) 50 mg tablet Take 1-2 tablets ( mg total) by mouth every 24 hours as needed for Pain. 40 tablet 0     Facility-Administered Encounter Medications as of 3/13/2019   Medication Dose Route Frequency Provider Last Rate Last Dose    onabotulinumtoxina injection 200  Units  200 Units Intramuscular Q90 Days Baldomero Giang MD   200 Units at 11/27/18 1533    onabotulinumtoxina injection 200 Units  200 Units Intramuscular Q90 Days Baldomero Giang MD        onabotulinumtoxina injection 200 Units  200 Units Intramuscular Q90 Days Baldomero Giang MD         No orders of the defined types were placed in this encounter.      Plan:         1. /BOOP- 82 y/o female with seropositive RA. Fleeting chest infiltrates/alveolar filling opacities responsive to corticosteroids. No histologic diagnosis, but ongoing management for presumed /BOOP. At prior visit her chest imaging had significantly cleared and SpO2 96% RA. Since initiation of steroid taper she has worsening alveolar consolidative opacities and worsening hypoxemia with SpO2 85-88% on RA at rest. No systemic features/purulent sputum.. On Levaquin x 2 days.. Suspect current exacerbation secondary to .. She is very frail;, fatigued with new need for supplemental O2.. Have discussed options with her.. Seems reasonable to admit for increase corticosteroids, supplemental O2 and IV abx pending cultures..     -- To ED for admission- d/w ED attending   -- Corticosteroids  -- CBC/PCT/CMP   -- Abx  -- blood/sputum cxs   -- Consult pulmonary medicine to assist with inpatient management..          Nathaniel Meraz M.D.   [facility]

## 2019-03-13 NOTE — HPI
83 year old woman with hx of interstitial lung disease/ cryptogenic organizing pneumonia secondary to seropositive RA, HTN, pulmonary HTN group 3, CVA presents with 5 days of increasing SOB, minimally productive cough, and generalized weakness. She is on plaquenil, sulfasalazine, and prednisone for her RA. She has multiple prior ICU admissions attributed to . She has been on a prednisone taper since being discharged from the hospital in Jan 2019, tapered down to 10mg prednisone daily last week, which is when her SOB and lethargy worsened. Daughter states that her symptoms normally worsen when she tapers down to 10mg of prednisone. She was evaluated in pulm clinic on 2/4 while taking prednisone 25mg daily at that time with improving respiratory status.   She went to urgent care two days ago, CXR showed new airspace opacities and was started on levaquin. Daughter states they increased the prednisone back to 15mg daily. Pt was evaluated by Dr. Meraz in pulm clinic today and sent to ED for inpatient steroids, antibiotics, and supplemental O2. SpO2 85-88% at rest. Pt denies fever, chills, or other associated symptoms. Not on home O2.

## 2019-03-13 NOTE — ASSESSMENT & PLAN NOTE
- On home plaquenil, sulfasalazine, and prednisone daily for RA  -  2/2 RA  - continue plaquenil 200mg BID. Hold sulfasalazine.   - follows with Dr. Rouse rheumatology

## 2019-03-13 NOTE — ASSESSMENT & PLAN NOTE
- Hx of recurring infiltrates on CXR due to  2/2 rheumatoid arthritis with multiple prior admissions  - Symptoms typically worsen when prednisone gets down to 10mg daily  - Consulted pulmonology. Appreciate recs.  - Solumedrol 125mg today, possibly down to 80mg next day. Consider prednisone as well.   - Incentive spirometry and flutter valve pulm toilet treatment prn  - Duonebs q4 wake  - continue home singulair   - Vancomycin and zosyn for empiric pneumonia coverage  - Follow up resp viral panel, sputum culture  - O2 NC prn  - Continue plaquenil 200mg BID. Hold sulfasalazine for now.

## 2019-03-13 NOTE — PROVIDER PROGRESS NOTES - EMERGENCY DEPT.
Encounter Date: 3/13/2019    ED Physician Progress Notes         EKG - STEMI Decision  Initial Reading: No STEMI present.  Response: No Action Needed.

## 2019-03-13 NOTE — H&P
[facility] Medicine  History & Physical    Patient Name: Selma Rosado MD Jose J  MRN: [id number]  Admission Date: 3/13/2019  Attending Physician: Sejal Nelson MD   Primary Care Provider: Bhargav Hirsch MD    [facility] Medicine Team: [facility] MED [address] MD     Patient information was obtained from patient, relative(s) and ER records.     Subjective:     Principal Problem:Cryptogenic organizing pneumonia    Chief Complaint:   Chief Complaint   Patient presents with    Shortness of Breath     Pt states she was sent by her MD for admission.  Family reports COPD.         HPI: 83 year old woman with hx of cryptogenic organizing pneumonia secondary to seropositive RA, HTN, CVA presents with 5 days of increasing SOB, minimally productive cough, and generalized weakness. She is on plaquenil, sulfasalazine, and prednisone for her RA. She has multiple prior ICU admissions attributed to . She has been on a prednisone taper since being discharged from the hospital in Jan 2019, tapered down to 10mg prednisone daily last week, which is when her SOB and lethargy worsened. Daughter states that her symptoms normally worsen when she tapers down to 10mg of prednisone. She was evaluated in pulm clinic on 2/4 while taking prednisone 25mg daily at that time with improving respiratory status.   She went to urgent care two days ago, CXR showed new airspace opacities and was started on levaquin. Daughter states they increased the prednisone back to 15mg daily. Pt was evaluated by Dr. Meraz in pulm clinic today and sent to ED for inpatient steroids, antibiotics, and supplemental O2. SpO2 85-88% at rest. Pt denies fever, chills, or other associated symptoms. Not on home O2.    Past Medical History:   Diagnosis Date    Altered mental status     Anemia     Arthritis     Bilateral hand pain 3/14/2018    Branch retinal vein occlusion, left eye 2/20/2015    Chronic bilateral low  back pain without sciatica 3/23/2017    Cognitive communication deficit 12/19/2017    Cystoid macular edema, left eye 2/20/2015    Cystoid macular edema, left eye 2/20/2015    Delirium superimposed on dementia     DJD (degenerative joint disease) of cervical spine 5/15/2013    Fatty liver 8/26/2014    Goiter 4/9/2018    Hashimoto's disease     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    Hypertension     Hypertensive encephalopathy     IBS (irritable bowel syndrome) 6/21/2017    IGT (impaired glucose tolerance) 1/12/2016    Iron deficiency anemia secondary to inadequate dietary iron intake 6/24/2013    Joint pain     Keratoconjunctivitis sicca of both eyes not specified as Sjogren's 7/29/2016    Leiomyoma of uterus, unspecified 9/16/2014    Long QT interval 6/29/2016    Due to medication (plaquenil)     Macular edema 1/12/2015    Multinodular goiter 1/12/2016    Neuropathy 8/23/2017    Plaquenil causing adverse effect in therapeutic use 10/7/2016    Pneumococcal vaccine refused 8/17/2016    Pneumonia due to Streptococcus pneumoniae 4/5/2018    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/2015    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    s/p Right Total knee replacement 5/15/2013    Scoliosis of thoracic spine 5/15/2013    Small vessel disease, cerebrovascular 12/28/2017    Stroke     UTI (urinary tract infection) 12/29/2018    Vascular dementia 12/6/2017       Past Surgical History:   Procedure Laterality Date    BREAST CYST EXCISION      CATARACT EXTRACTION      COLONOSCOPY N/A 9/29/2015    Performed by FIDELINA Sanchez MD at [facility] ENDO (4TH FLR)    EGD (ESOPHAGOGASTRODUODENOSCOPY) N/A 3/11/2014    Performed by Federico Escobar MD at [facility] ENDO (4TH FLR)    EGD (ESOPHAGOGASTRODUODENOSCOPY) N/A 11/5/2013    Performed by Federico sEcobar MD at [facility] ENDO (4TH FLR)    ESOPHAGOGASTRODUODENOSCOPY (EGD) N/A 10/4/2017    Performed by Mg Morton MD at [facility] END[facility]    EYE SURGERY       INJECTION-STEROID-EPIDURAL-TRANSFORAMINAL Right 9/20/2017    Performed by Joselin Valdez [facility] at [facility]GT    JOINT REPLACEMENT      right knee    KNEE SURGERY Left 12/31/2013    TKR    left parotidectomy      mixed tumor    SALIVARY GLAND SURGERY      TONSILLECTOMY         Review of patient's allergies indicates:  No Known Allergies    Current Facility-Administered Medications on File Prior to Encounter   Medication    onabotulinumtoxina injection 200 Units    onabotulinumtoxina injection 200 Units    onabotulinumtoxina injection 200 Units     Current Outpatient Medications on File Prior to Encounter   Medication Sig    acetaminophen (TYLENOL) 500 MG tablet Take 1,000 mg by mouth daily as needed for Pain.    albuterol (PROVENTIL/VENTOLIN HFA) 90 mcg/actuation inhaler Inhale 2 puffs into the lungs every 6 (six) hours as needed for Wheezing. Rescue    aspirin (ECOTRIN) 81 MG EC tablet Take 81 mg by mouth once daily.    carvedilol (COREG) 12.5 MG tablet Take 1 tablet (12.5 mg total) by mouth 2 (two) times daily with meals.    ciclopirox (PENLAC) 8 % Soln Apply to affected nails qhs. Clean nails with alcohol q7 days.    clopidogrel (PLAVIX) 75 mg tablet TAKE 1 TABLET DAILY    diazePAM (VALIUM) 2 MG tablet Take 1 tablet (2 mg total) by mouth as needed for Anxiety. (Patient taking differently: Take 2 mg by mouth every evening. )    furosemide (LASIX) 20 MG tablet Take 1 tablet (20 mg total) by mouth once daily.    gabapentin (NEURONTIN) 300 MG capsule Take 1 capsule (300 mg total) by mouth every evening.    hydroxychloroquine (PLAQUENIL) 200 mg tablet Take 1 tablet (200 mg total) by mouth 2 (two) times daily.    levoFLOXacin (LEVAQUIN) 750 MG tablet Take 1 tablet (750 mg total) by mouth once daily. for 7 days    linaclotide (LINZESS) 145 mcg Cap capsule Take 1 capsule (145 mcg total) by mouth once daily.    magnesium oxide (MAG-OX) 400 mg tablet Take 400 mg by mouth once daily.    montelukast  (SINGULAIR) 10 mg tablet Take 1 tablet (10 mg total) by mouth every evening.    omeprazole (PRILOSEC) 20 MG capsule Take 1 capsule (20 mg total) by mouth once daily.    potassium chloride SA (K-DUR,KLOR-CON) 10 MEQ tablet Take 2 tablets (20 mEq total) by mouth once daily.    predniSONE (DELTASONE) 20 MG tablet Take 2 tablets (40 mg total) by mouth once daily. for 10 days    predniSONE (DELTASONE) 5 MG tablet Take 2.5 tablets (12.5 mg total) by mouth once daily. Taper as advised    sulfaSALAzine (AZULFIDINE) 500 MG TbEC Take 2 tablets (1,000 mg total) by mouth 2 (two) times daily.    traMADol (ULTRAM) 50 mg tablet Take 1-2 tablets ( mg total) by mouth every 24 hours as needed for Pain.     Family History     Problem Relation (Age of Onset)    Breast cancer Maternal Grandmother    Cancer Father    Heart disease Mother    Hypertension Mother    Prostate cancer Father        Tobacco Use    Smoking status: Never Smoker    Smokeless tobacco: Never Used   Substance and Sexual Activity    Alcohol use: No     Alcohol/week: 0.0 oz     Comment: very seldom     Drug use: No    Sexual activity: No     Comment: ,  age 52,      Review of Systems   Constitutional: Positive for activity change and fatigue. Negative for chills and fever.   HENT: Negative for sore throat.    Eyes: Negative for visual disturbance.   Respiratory: Positive for cough and shortness of breath.    Cardiovascular: Negative for chest pain.   Gastrointestinal: Negative for abdominal pain, diarrhea, nausea and vomiting.   Genitourinary: Negative for dysuria.   Musculoskeletal: Negative for myalgias.   Skin: Negative for rash and wound.   Neurological: Positive for weakness. Negative for dizziness, light-headedness and headaches.     Objective:     Vital Signs (Most Recent):  Temp: 97.9 °F (36.6 °C) (03/13/19 1210)  Pulse: 85 (03/13/19 1701)  Resp: (!) 21 (03/13/19 1630)  BP: 119/60 (03/13/19 1701)  SpO2: 99 % (03/13/19 1631) Vital  Signs (24h Range):  Temp:  [97.9 °F (36.6 °C)] 97.9 °F (36.6 °C)  Pulse:  [84-97] 85  Resp:  [20-22] 21  SpO2:  [88 %-100 %] 99 %  BP: (100-130)/(51-70) 119/60     Weight: 73.9 kg (163 lb)  Body mass index is 27.98 kg/m².    Physical Exam   Constitutional: She is oriented to person, place, and time. She appears well-developed and well-nourished. No distress.   Frail, well-appearing, somnolent   HENT:   Head: Normocephalic and atraumatic.   Eyes: No scleral icterus.   Neck: Neck supple.   Cardiovascular: Normal rate and regular rhythm.   Pulmonary/Chest: Effort normal. No stridor. She has no wheezes. She has rales (R>L).   Abdominal: Soft. Bowel sounds are normal. She exhibits no distension. There is no tenderness. There is no guarding.   Musculoskeletal: She exhibits no edema, tenderness or deformity.   Neurological: She is alert and oriented to person, place, and time.   Skin: Skin is warm and dry. She is not diaphoretic. No erythema. No pallor.   Nursing note and vitals reviewed.       Significant Labs:   ABGs:   Recent Labs   Lab 03/13/19  1328   PH 7.363  7.363   PCO2 35.3  35.3   HCO3 20.1*  20.1*   POCSATURATED 49*  49*   BE -5  -5     Blood Culture: No results for input(s): LABBLOO in the last 48 hours.  BMP:   Recent Labs   Lab 03/13/19  1312   *   *   K 4.5   CL 99   CO2 23   BUN 19   CREATININE 1.5*   CALCIUM 9.9     CBC:   Recent Labs   Lab 03/13/19  1312   WBC 14.37*   HGB 12.1   HCT 38.4        CMP:   Recent Labs   Lab 03/13/19  1312   *   K 4.5   CL 99   CO2 23   *   BUN 19   CREATININE 1.5*   CALCIUM 9.9   ANIONGAP 12   EGFRNONAA 32.4*     Cardiac Markers:   Recent Labs   Lab 03/13/19  1312   BNP 97     Lactic Acid:   Recent Labs   Lab 03/13/19  1615   LACTATE 0.9     POCT Glucose: No results for input(s): POCTGLUCOSE in the last 48 hours.  Respiratory Culture: No results for input(s): GSRESP, RESPIRATORYC in the last 48 hours.    Significant Imaging:   Procedure  Component Value Units Date/Time   X-Ray Chest PA And Lateral [[id number]] Resulted: 03/13/19 1718   Order Status: Completed Updated: 03/13/19 1720   Narrative:     EXAMINATION:  XR CHEST PA AND LATERAL    CLINICAL HISTORY:  , new evidence of PNA;    TECHNIQUE:  PA and lateral views of the chest were performed.    COMPARISON:  Chest radiograph 03/11/2019    FINDINGS:  Monitoring leads overlie the chest.  Cardiomediastinal silhouette is midline and stable.  Allowing for differences in positioning and technique, no significant interval change in the cardiopulmonary status from prior including bilateral patchy opacities at the left midlung zone and right lung base.  Grossly stable chronic changes including platelike scarring in the right lung base and left upper lung zone.  No large pneumothorax or definite new focal opacity.  No acute osseous process seen.  PA and lateral views can be obtained.   Impression:       Grossly stable abnormal radiographic appearance of the chest as above when compared to 03/11/2019 exam.      Electronically signed by: Keenan Machuca MD  Date: 03/13/2019  Time: 17:18     Assessment/Plan:     * Cryptogenic organizing pneumonia    - Hx of recurring infiltrates on CXR due to  2/2 rheumatoid arthritis with multiple prior admissions  - Symptoms typically worsen when prednisone gets down to 10mg daily  - Consulted pulmonology. Appreciate recs.  - Solumedrol 125mg today, possibly down to 80mg next day. Consider prednisone as well.   - Incentive spirometry and flutter valve pulm toilet treatment prn  - Duonebs q4 wake  - continue home singulair   - Vancomycin and zosyn for empiric pneumonia coverage  - Follow up resp viral panel, sputum culture  - O2 NC prn  - Continue plaquenil 200mg BID. Hold sulfasalazine for now.      KERMIT (acute kidney injury)    - Cr 1.5  - Baseline Cr 1.1  - 1L bolus NS  - Renally dose antibiotics. Avoid nephrotoxic medications.  - Continue to monitor with hydration      Seropositive rheumatoid arthritis of multiple sites    - On home plaquenil, sulfasalazine, and prednisone daily for RA  -  2/2 RA  - continue plaquenil 200mg BID. Hold sulfasalazine.   - follows with Dr. Rouse rheumatology     Dystonia    - follows with Dr. Giang in neurology  - continue home gabapentin 300mg qhs     Long-term use of immunosuppressant medication    - On home plaquenil, sulfasalazine, and prednisone daily for RA  - see seropositive RA     History of stroke    - On ASA, plavix daily  - Wheelchair bound     Essential hypertension    - continue home coreg  - hold lasix due to KERMIT       VTE Risk Mitigation (From admission, onward)        Ordered     heparin (porcine) injection 5,000 Units  Every 8 hours      03/13/19 4581     IP VTE HIGH RISK PATIENT  Once      03/13/19 1383        Dom Street MD  Department of Hospital Medicine   [facility]

## 2019-03-13 NOTE — ED NOTES
Pt is asleep and responds to voice. Respirations are spontaneous with normal rate and effort. Family at bedside. Pt has no needs at this time.

## 2019-03-13 NOTE — ASSESSMENT & PLAN NOTE
- Cr 1.5  - Baseline Cr 1.1  - 1L bolus NS  - Renally dose antibiotics. Avoid nephrotoxic medications.  - Continue to monitor with hydration

## 2019-03-13 NOTE — ED TRIAGE NOTES
"Patient sent from clinic for eval of SOB and low O2 sat. Patient was diagnosed with pneumonia Monday at urgent care - reports that she has "felt fine" but has worsening SOB with exertion. Reports non-productive, dry cough "for a while" and body aches. Patient denies any nausea, vomiting, abdominal pain, chest pain, or swelling.   "

## 2019-03-13 NOTE — ED PROVIDER NOTES
Encounter Date: 3/13/2019    SCRIBE #1 NOTE: I, Meron Kinsey, am scribing for, and in the presence of,  Kranthi Khanna MD. I have scribed the following portions of the note - Other sections scribed: HPI ROS PE .       History     Chief Complaint   Patient presents with    Shortness of Breath     Pt states she was sent by her MD for admission.  Family reports COPD.      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 Associated symptoms of fatigue and dry cough for the past few days. Patient was sent in from the pulmonary clinic for admission. Her chest imaging showed new airspace opacities. Patient's oxygen saturations were consistently at 85-86 while sitting in her wheelchair in the clinic. Denies fever or any other associated symptoms. Patient is not on home oxygen. Patient's daughter recently increase the patient's Prednisone back to 15mg as her physician tried to wean her off Prednisone. She states symptoms shortly started after prednisone was reduced.       The history is provided by the patient and medical records.     Review of patient's allergies indicates:  No Known Allergies  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    Hypertension     Hypertensive encephalopathy     IBS  (irritable bowel syndrome) 6/21/2017    IGT (impaired glucose tolerance) 1/12/2016    Iron deficiency anemia secondary to inadequate dietary iron intake 6/24/2013    Joint pain     Keratoconjunctivitis sicca of both eyes not specified as Sjogren's 7/29/2016    Leiomyoma of uterus, unspecified 9/16/2014    Long QT interval 6/29/2016    Due to medication (plaquenil)     Macular edema 1/12/2015    Multinodular goiter 1/12/2016    Neuropathy 8/23/2017    Plaquenil causing adverse effect in therapeutic use 10/7/2016    Pneumococcal vaccine refused 8/17/2016    Pneumonia due to Streptococcus pneumoniae 4/5/2018    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/2015    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    s/p Right Total knee replacement 5/15/2013    Scoliosis of thoracic spine 5/15/2013    Small vessel disease, cerebrovascular 12/28/2017    Stroke     UTI (urinary tract infection) 12/29/2018    Vascular dementia 12/6/2017     Past Surgical History:   Procedure Laterality Date    BREAST CYST EXCISION      CATARACT EXTRACTION      COLONOSCOPY N/A 9/29/2015    Performed by FIDELINA Sanchez MD at [facility] ENDO (4TH FLR)    EGD (ESOPHAGOGASTRODUODENOSCOPY) N/A 3/11/2014    Performed by Federico Escobar MD at [facility] ENDO (4TH FLR)    EGD (ESOPHAGOGASTRODUODENOSCOPY) N/A 11/5/2013    Performed by [facility]obar [facility] at [facility] ENDO (4TH FLR)    ESOPHAGOGASTRODUODENOSCOPY (EGD) N/A 10/4/2017    [facility]    EYE SURGERY      INJECTION-STEROID-EPIDURAL-TRANSFORAMINAL Right 9/20/2017    [facility] PAIN MGT    JOINT REPLACEMENT      right knee    KNEE SURGERY Left 12/31/2013    TKR    left parotidectomy      mixed tumor    SALIVARY GLAND SURGERY      TONSILLECTOMY       Family History   Problem Relation Age of Onset    Hypertension Mother     Heart disease Mother     Prostate cancer Father         prostate cancer    Cancer Father      Breast cancer Maternal Grandmother     Lupus Neg Hx     Psoriasis Neg Hx     Melanoma Neg Hx     Colon cancer Neg Hx      Social History     Tobacco Use    Smoking status: Never Smoker    Smokeless tobacco: Never Used   Substance Use Topics    Alcohol use: No     Alcohol/week: 0.0 oz     Comment: very seldom     Drug use: No     Review of Systems   Constitutional: Positive for fatigue. Negative for fever.   HENT: Negative for sore throat.    Respiratory: Positive for cough and shortness of breath.    Cardiovascular: Negative for chest pain.   Gastrointestinal: Negative for nausea.   Genitourinary: Negative for dysuria.   Musculoskeletal: Negative for back pain.   Skin: Negative for rash.   Neurological: Negative for weakness.   Hematological: Does not bruise/bleed easily.       Physical Exam     Initial Vitals [03/13/19 1210]   BP Pulse Resp Temp SpO2   (!) 105/51 97 20 97.9 °F (36.6 °C) (!) 92 %      MAP       --         Physical Exam    Nursing note and vitals reviewed.  Constitutional: She appears well-developed and well-nourished. She is not diaphoretic. No distress.   HENT:   Head: Normocephalic and atraumatic.   Right Ear: External ear normal.   Left Ear: External ear normal.   Nose: Nose normal.   Mouth/Throat: Oropharynx is clear and moist.   Neck: Neck supple.   Cardiovascular: Normal rate, regular rhythm, normal heart sounds and intact distal pulses.   Pulmonary/Chest: No respiratory distress. She has no wheezes. She has no rhonchi. She has rales. She exhibits no tenderness.   Right middle and lower lobe rales   Abdominal: Soft. She exhibits no distension. There is no tenderness.   Musculoskeletal: She exhibits edema (mild lower extremities pitting edema).   Neurological: She is alert and oriented to person, place, and time. GCS score is 15. GCS eye subscore is 4. GCS verbal subscore is 5. GCS motor subscore is 6.   Skin: Skin is warm. Capillary refill takes less than 2 seconds. No rash noted.    Psychiatric: She has a normal mood and affect.         ED Course   Procedures  Labs Reviewed   CBC W/ AUTO DIFFERENTIAL - Abnormal; Notable for the following components:       Result Value    WBC 14.37 (*)     MCHC 31.5 (*)     Immature Granulocytes 0.6 (*)     Gran # (ANC) 9.7 (*)     Immature Grans (Abs) 0.08 (*)     Mono # 3.0 (*)     Lymph% 10.2 (*)     Mono% 21.2 (*)     All other components within normal limits   BASIC METABOLIC PANEL - Abnormal; Notable for the following components:    Sodium 134 (*)     Glucose 157 (*)     Creatinine 1.5 (*)     eGFR if  37.4 (*)     eGFR if non  32.4 (*)     All other components within normal limits   HEMOGLOBIN A1C - Abnormal; Notable for the following components:    Hemoglobin A1C 5.7 (*)     All other components within normal limits    Narrative:     ADD-ON GHGB #[id number] PER KENROY BLANK MD 16:32  03/13/2019   ADD-ON PCAL #[id number] PER KENROY BLANK MD 16:33  03/13/2019   ADD-ON PT-INR #[id number] PER KENROY BLANK MD 16:34  03/13/2019    PROCALCITONIN - Abnormal; Notable for the following components:    Procalcitonin 0.29 (*)     All other components within normal limits    Narrative:     ADD-ON GHGB #[id number] PER KENROY BLANK MD 16:32  03/13/2019   ADD-ON PCAL #[id number] PER KENROY BLANK MD 16:33  03/13/2019   ADD-ON PT-INR #[id number] PER KENROY BLANK MD 16:34  03/13/2019    ISTAT PROCEDURE - Abnormal; Notable for the following components:    POC PO2 27 (*)     POC HCO3 20.1 (*)     POC SATURATED O2 49 (*)     POC TCO2 21 (*)     All other components within normal limits   ISTAT PROCEDURE - Abnormal; Notable for the following components:    POC PO2 27 (*)     POC HCO3 20.1 (*)     POC SATURATED O2 49 (*)     POC TCO2 21 (*)     All other components within normal limits   RESPIRATORY VIRAL PANEL BY PCR   B-TYPE NATRIURETIC PEPTIDE   LACTIC ACID, PLASMA   URINALYSIS, REFLEX TO URINE CULTURE    Narrative:      Preferred Collection Type->Urine, Clean Catch   HEMOGLOBIN A1C   PROCALCITONIN   PROTIME-INR   TSH   PROTIME-INR    Narrative:     ADD-ON GHGB #[id number] PER KENROY BLANK MD 16:32  03/13/2019   ADD-ON PCAL #[id number] PER KENROY BLANK MD 16:33  03/13/2019   ADD-ON PT-INR #[id number] PER KENROY BLANK MD 16:34  03/13/2019    TSH    Narrative:     ADD-ON GHGB #[id number] PER KENROY BLANK MD 16:32  03/13/2019   ADD-ON PCAL #[id number] PER KENROY BLANK MD 16:33  03/13/2019   ADD-ON PT-INR #[id number] PER KENROY BLANK MD 16:34  03/13/2019   ADD-ON TSH #[id number] PER KENROY BLANK MD 17:32  03/13/2019    POCT INFLUENZA A/B MOLECULAR     EKG Readings: (Independently Interpreted)   Initial Reading: No STEMI. Rhythm: Normal Sinus Rhythm. Heart Rate: 97.     ECG Results          EKG 12-lead (Final result)  Result time 03/14/19 16:45:24    Final result by Interface, Lab In [facility] (03/14/19 16:45:24)                 Narrative:    Test Reason :     Vent. Rate : 097 BPM     Atrial Rate : 097 BPM     P-R Int : 140 ms          QRS Dur : 086 ms      QT Int : 376 ms       P-R-T Axes : 058 -03 050 degrees     QTc Int : 477 ms    Normal sinus rhythm  Possible Left atrial enlargement  LVH  Nonspecific T wave abnormality  Prolonged QT  Abnormal ECG  When compared with ECG of 11-MAR-2019 14:44,  Nonspecific T wave abnormality now evident in Anterior leads  Confirmed by Mukesh Cunningham MD ([id number]) on 3/14/2019 4:45:11 PM    Referred By: [facility]           Confirmed By:Mukesh Cunningham MD                             EKG 12-LEAD (Final result)  Result time 03/15/19 12:22:56              Imaging Results          X-Ray Chest PA And Lateral (Final result)  Result time 03/13/19 17:18:00    Final result by Keenan Machuca MD (03/13/19 17:18:00)                 Impression:      Grossly stable abnormal radiographic appearance of the chest as above when compared to 03/11/2019 exam.      Electronically signed  by: Keenan Machuca MD  Date:    03/13/2019  Time:    17:18             Narrative:    EXAMINATION:  XR CHEST PA AND LATERAL    CLINICAL HISTORY:  , new evidence of PNA;    TECHNIQUE:  PA and lateral views of the chest were performed.    COMPARISON:  Chest radiograph 03/11/2019    FINDINGS:  Monitoring leads overlie the chest.  Cardiomediastinal silhouette is midline and stable.  Allowing for differences in positioning and technique, no significant interval change in the cardiopulmonary status from prior including bilateral patchy opacities at the left midlung zone and right lung base.  Grossly stable chronic changes including platelike scarring in the right lung base and left upper lung zone.  No large pneumothorax or definite new focal opacity.  No acute osseous process seen.  PA and lateral views can be obtained.                                 Medical Decision Making:   History:   Old Medical Records: I decided to obtain old medical records.  Old Records Summarized: records from clinic visits and records from previous admission(s).  Independently Interpreted Test(s):   I have ordered and independently interpreted EKG Reading(s) - see prior notes  Clinical Tests:   Lab Tests: Ordered and Reviewed  Medical Tests: Ordered and Reviewed  ED Management:  Vitals normal. Afebrile. Here w/ hypoxia in clinic; becomes hypoxic on exertion. CXR concerning for PNA/. Discussed with pulmonology PTA; they recommend antibiotics, steroids and blood cx. CBC and BMP obtained. Labs reviewed; significant for WBC 14.37K, sCr 1.5 (baseline). Solumedrol, vanc/zosyn given.  Discussed with internal medicine who will admit.  Other:   I have discussed this case with another health care provider.            Scribe Attestation:   Scribe #1: I performed the above scribed service and the documentation accurately describes the services I performed. I attest to the accuracy of the note.               Clinical Impression:       ICD-10-CM ICD-9-CM    1. Cryptogenic organizing pneumonia J84.116 516.36   2. Leukocytosis, unspecified type D72.829 288.60   3. KERMIT (acute kidney injury) N17.9 584.9   4. Long-term use of immunosuppressant medication Z79.899 V58.69   5. Seropositive rheumatoid arthritis of multiple sites M05.79 714.0   6. Pneumonia of both lungs due to infectious organism, unspecified part of lung J18.9 483.8         Disposition:   Disposition: Admitted                        Kranthi Khanna MD  03/15/19 1251

## 2019-03-14 ENCOUNTER — TELEPHONE (OUTPATIENT)
Dept: URGENT CARE | Facility: CLINIC | Age: 84
End: 2019-03-14

## 2019-03-14 PROBLEM — J84.89 INTERSTITIAL LUNG DISEASE DUE TO CONNECTIVE TISSUE DISEASE: Status: ACTIVE | Noted: 2019-03-14

## 2019-03-14 PROBLEM — M35.9 INTERSTITIAL LUNG DISEASE DUE TO CONNECTIVE TISSUE DISEASE: Status: ACTIVE | Noted: 2019-03-14

## 2019-03-14 PROBLEM — J84.9 ILD (INTERSTITIAL LUNG DISEASE): Status: ACTIVE | Noted: 2019-03-14

## 2019-03-14 PROBLEM — J84.9 PULMONARY HYPERTENSION DUE TO INTERSTITIAL LUNG DISEASE: Status: ACTIVE | Noted: 2019-03-14

## 2019-03-14 PROBLEM — J18.9 CAP (COMMUNITY ACQUIRED PNEUMONIA): Status: ACTIVE | Noted: 2019-03-14

## 2019-03-14 PROBLEM — I27.23 PULMONARY HYPERTENSION DUE TO INTERSTITIAL LUNG DISEASE: Status: ACTIVE | Noted: 2019-03-14

## 2019-03-14 LAB
ALBUMIN SERPL BCP-MCNC: 2.8 G/DL
ALP SERPL-CCNC: 74 U/L
ALT SERPL W/O P-5'-P-CCNC: 13 U/L
ANION GAP SERPL CALC-SCNC: 11 MMOL/L
AST SERPL-CCNC: 11 U/L
BASOPHILS # BLD AUTO: 0.02 K/UL
BASOPHILS NFR BLD: 0.2 %
BILIRUB SERPL-MCNC: 0.3 MG/DL
BUN SERPL-MCNC: 19 MG/DL
CALCIUM SERPL-MCNC: 9 MG/DL
CHLORIDE SERPL-SCNC: 100 MMOL/L
CO2 SERPL-SCNC: 22 MMOL/L
CREAT SERPL-MCNC: 1.3 MG/DL
DIFFERENTIAL METHOD: ABNORMAL
EOSINOPHIL # BLD AUTO: 0 K/UL
EOSINOPHIL NFR BLD: 0.2 %
ERYTHROCYTE [DISTWIDTH] IN BLOOD BY AUTOMATED COUNT: 14.2 %
EST. GFR  (AFRICAN AMERICAN): 44.5 ML/MIN/1.73 M^2
EST. GFR  (NON AFRICAN AMERICAN): 38.6 ML/MIN/1.73 M^2
GLUCOSE SERPL-MCNC: 183 MG/DL
HCT VFR BLD AUTO: 34.9 %
HGB BLD-MCNC: 10.9 G/DL
IMM GRANULOCYTES # BLD AUTO: 0.09 K/UL
IMM GRANULOCYTES NFR BLD AUTO: 0.7 %
LYMPHOCYTES # BLD AUTO: 1.6 K/UL
LYMPHOCYTES NFR BLD: 12.8 %
MAGNESIUM SERPL-MCNC: 1.7 MG/DL
MCH RBC QN AUTO: 27.5 PG
MCHC RBC AUTO-ENTMCNC: 31.2 G/DL
MCV RBC AUTO: 88 FL
MONOCYTES # BLD AUTO: 2.5 K/UL
MONOCYTES NFR BLD: 20 %
NEUTROPHILS # BLD AUTO: 8.1 K/UL
NEUTROPHILS NFR BLD: 66.1 %
NRBC BLD-RTO: 0 /100 WBC
PHOSPHATE SERPL-MCNC: 3.5 MG/DL
PLATELET # BLD AUTO: 274 K/UL
PMV BLD AUTO: 9.6 FL
POTASSIUM SERPL-SCNC: 4.3 MMOL/L
PROT SERPL-MCNC: 6.2 G/DL
RBC # BLD AUTO: 3.97 M/UL
SODIUM SERPL-SCNC: 133 MMOL/L
VANCOMYCIN SERPL-MCNC: 12.3 UG/ML
WBC # BLD AUTO: 12.29 K/UL

## 2019-03-14 PROCEDURE — 87106 FUNGI IDENTIFICATION YEAST: CPT

## 2019-03-14 PROCEDURE — 25000242 PHARM REV CODE 250 ALT 637 W/ HCPCS: Performed by: INTERNAL MEDICINE

## 2019-03-14 PROCEDURE — 87070 CULTURE OTHR SPECIMN AEROBIC: CPT

## 2019-03-14 PROCEDURE — 25000003 PHARM REV CODE 250: Performed by: INTERNAL MEDICINE

## 2019-03-14 PROCEDURE — 80202 ASSAY OF VANCOMYCIN: CPT

## 2019-03-14 PROCEDURE — 85025 COMPLETE CBC W/AUTO DIFF WBC: CPT

## 2019-03-14 PROCEDURE — 94761 N-INVAS EAR/PLS OXIMETRY MLT: CPT

## 2019-03-14 PROCEDURE — 94664 DEMO&/EVAL PT USE INHALER: CPT

## 2019-03-14 PROCEDURE — 84100 ASSAY OF PHOSPHORUS: CPT

## 2019-03-14 PROCEDURE — 99233 SBSQ HOSP IP/OBS HIGH 50: CPT | Mod: GC,,, | Performed by: INTERNAL MEDICINE

## 2019-03-14 PROCEDURE — 80053 COMPREHEN METABOLIC PANEL: CPT

## 2019-03-14 PROCEDURE — 99900035 HC TECH TIME PER 15 MIN (STAT)

## 2019-03-14 PROCEDURE — 27000221 HC OXYGEN, UP TO 24 HOURS

## 2019-03-14 PROCEDURE — 94640 AIRWAY INHALATION TREATMENT: CPT

## 2019-03-14 PROCEDURE — 99223 1ST HOSP IP/OBS HIGH 75: CPT | Mod: GC,,, | Performed by: INTERNAL MEDICINE

## 2019-03-14 PROCEDURE — 99233 PR SUBSEQUENT HOSPITAL CARE,LEVL III: ICD-10-PCS | Mod: GC,,, | Performed by: INTERNAL MEDICINE

## 2019-03-14 PROCEDURE — 99223 PR INITIAL HOSPITAL CARE,LEVL III: ICD-10-PCS | Mod: GC,,, | Performed by: INTERNAL MEDICINE

## 2019-03-14 PROCEDURE — 87205 SMEAR GRAM STAIN: CPT

## 2019-03-14 PROCEDURE — 83735 ASSAY OF MAGNESIUM: CPT

## 2019-03-14 PROCEDURE — 63600175 PHARM REV CODE 636 W HCPCS: Performed by: INTERNAL MEDICINE

## 2019-03-14 PROCEDURE — 63600175 PHARM REV CODE 636 W HCPCS: Performed by: STUDENT IN AN ORGANIZED HEALTH CARE EDUCATION/TRAINING PROGRAM

## 2019-03-14 PROCEDURE — 20600001 HC STEP DOWN PRIVATE ROOM

## 2019-03-14 RX ORDER — LEVOFLOXACIN 750 MG/1
750 TABLET ORAL EVERY OTHER DAY
Status: COMPLETED | OUTPATIENT
Start: 2019-03-14 | End: 2019-03-18

## 2019-03-14 RX ORDER — SULFASALAZINE 500 MG/1
1000 TABLET, DELAYED RELEASE ORAL 2 TIMES DAILY
Qty: 120 TABLET | Refills: 2
Start: 2019-03-14 | End: 2019-03-18 | Stop reason: SDUPTHER

## 2019-03-14 RX ORDER — SODIUM CHLORIDE 0.9 % (FLUSH) 0.9 %
5 SYRINGE (ML) INJECTION
Status: DISCONTINUED | OUTPATIENT
Start: 2019-03-14 | End: 2019-03-18 | Stop reason: HOSPADM

## 2019-03-14 RX ORDER — OXYCODONE HYDROCHLORIDE 5 MG/1
5 TABLET ORAL EVERY 6 HOURS PRN
Status: DISCONTINUED | OUTPATIENT
Start: 2019-03-14 | End: 2019-03-18 | Stop reason: HOSPADM

## 2019-03-14 RX ORDER — PREDNISONE 20 MG/1
60 TABLET ORAL DAILY
Qty: 90 TABLET | Refills: 1 | Status: SHIPPED | OUTPATIENT
Start: 2019-03-15 | End: 2019-05-08 | Stop reason: SDUPTHER

## 2019-03-14 RX ORDER — PREDNISONE 20 MG/1
80 TABLET ORAL DAILY
Status: DISCONTINUED | OUTPATIENT
Start: 2019-03-14 | End: 2019-03-14

## 2019-03-14 RX ORDER — PREDNISONE 20 MG/1
60 TABLET ORAL DAILY
Status: DISCONTINUED | OUTPATIENT
Start: 2019-03-14 | End: 2019-03-14

## 2019-03-14 RX ORDER — PREDNISONE 20 MG/1
60 TABLET ORAL DAILY
Status: DISCONTINUED | OUTPATIENT
Start: 2019-03-15 | End: 2019-03-18 | Stop reason: HOSPADM

## 2019-03-14 RX ORDER — SULFASALAZINE 500 MG/1
1000 TABLET ORAL 2 TIMES DAILY
Status: ON HOLD | COMMUNITY
End: 2019-03-15 | Stop reason: CLARIF

## 2019-03-14 RX ADMIN — IPRATROPIUM BROMIDE AND ALBUTEROL SULFATE 3 ML: .5; 3 SOLUTION RESPIRATORY (INHALATION) at 03:03

## 2019-03-14 RX ADMIN — ASPIRIN 81 MG: 81 TABLET, COATED ORAL at 09:03

## 2019-03-14 RX ADMIN — Medication 4.5 G: at 06:03

## 2019-03-14 RX ADMIN — HYDROXYCHLOROQUINE SULFATE 200 MG: 200 TABLET, FILM COATED ORAL at 09:03

## 2019-03-14 RX ADMIN — SODIUM CHLORIDE 1000 ML: 0.9 INJECTION, SOLUTION INTRAVENOUS at 09:03

## 2019-03-14 RX ADMIN — HEPARIN SODIUM 5000 UNITS: 5000 INJECTION, SOLUTION INTRAVENOUS; SUBCUTANEOUS at 05:03

## 2019-03-14 RX ADMIN — HEPARIN SODIUM 5000 UNITS: 5000 INJECTION, SOLUTION INTRAVENOUS; SUBCUTANEOUS at 11:03

## 2019-03-14 RX ADMIN — MONTELUKAST SODIUM 10 MG: 10 TABLET, FILM COATED ORAL at 09:03

## 2019-03-14 RX ADMIN — CLOPIDOGREL 75 MG: 75 TABLET, FILM COATED ORAL at 09:03

## 2019-03-14 RX ADMIN — LEVOFLOXACIN 750 MG: 750 TABLET, FILM COATED ORAL at 12:03

## 2019-03-14 RX ADMIN — IPRATROPIUM BROMIDE AND ALBUTEROL SULFATE 3 ML: .5; 3 SOLUTION RESPIRATORY (INHALATION) at 09:03

## 2019-03-14 RX ADMIN — CARVEDILOL 12.5 MG: 12.5 TABLET, FILM COATED ORAL at 05:03

## 2019-03-14 RX ADMIN — GABAPENTIN 300 MG: 300 CAPSULE ORAL at 09:03

## 2019-03-14 RX ADMIN — PREDNISONE 80 MG: 20 TABLET ORAL at 09:03

## 2019-03-14 RX ADMIN — CARVEDILOL 12.5 MG: 12.5 TABLET, FILM COATED ORAL at 10:03

## 2019-03-14 RX ADMIN — PANTOPRAZOLE SODIUM 40 MG: 40 TABLET, DELAYED RELEASE ORAL at 09:03

## 2019-03-14 NOTE — SUBJECTIVE & OBJECTIVE
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    Hypertension     Hypertensive encephalopathy     IBS (irritable bowel syndrome) 6/21/2017    IGT (impaired glucose tolerance) 1/12/2016    Iron deficiency anemia secondary to inadequate dietary iron intake 6/24/2013    Joint pain     Keratoconjunctivitis sicca of both eyes not specified as Sjogren's 7/29/2016    Leiomyoma of uterus, unspecified 9/16/2014    Long QT interval 6/29/2016    Due to medication (plaquenil)     Macular edema 1/12/2015    Multinodular goiter 1/12/2016    Neuropathy 8/23/2017    Plaquenil causing adverse effect in therapeutic use 10/7/2016    Pneumococcal vaccine refused 8/17/2016    Pneumonia due to Streptococcus pneumoniae 4/5/2018    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/2015    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    s/p Right Total knee replacement 5/15/2013    Scoliosis of thoracic spine 5/15/2013    Small vessel disease, cerebrovascular 12/28/2017    Stroke     UTI (urinary tract infection) 12/29/2018    Vascular dementia 12/6/2017       Past Surgical History:   Procedure Laterality Date    BREAST CYST EXCISION      CATARACT EXTRACTION      COLONOSCOPY N/A 9/29/2015    Performed by FIDELINA Sanchez MD at [facility] ENDO (4TH FLR)    EGD (ESOPHAGOGASTRODUODENOSCOPY) N/A 3/11/2014    Performed by Federico Escobar MD at [facility] ENDO (4TH FLR)    EGD (ESOPHAGOGASTRODUODENOSCOPY)  N/A 11/5/2013    [facility] ([facility])    ESOPHAGOGASTRODUODENOSCOPY (EGD) N/A 10/4/2017    [facility] by [facility]    EYE SURGERY      INJECTION-STEROID-EPIDURAL-TRANSFORAMINAL Right 9/20/2017    [facility] PAIN MGT    JOINT REPLACEMENT      right knee    KNEE SURGERY Left 12/31/2013    TKR    left parotidectomy      mixed tumor    SALIVARY GLAND SURGERY      TONSILLECTOMY         Review of patient's allergies indicates:  No Known Allergies    Family History     Problem Relation (Age of Onset)    Breast cancer Maternal Grandmother    Cancer Father    Heart disease Mother    Hypertension Mother    Prostate cancer Father        Tobacco Use    Smoking status: Never Smoker    Smokeless tobacco: Never Used   Substance and Sexual Activity    Alcohol use: No     Alcohol/week: 0.0 oz     Comment: very seldom     Drug use: No    Sexual activity: No     Comment: ,  age 52,          Review of Systems   Constitutional: Positive for activity change and fatigue. Negative for chills and fever.   HENT: Negative for congestion, postnasal drip, rhinorrhea and sore throat.    Respiratory: Positive for shortness of breath.    Cardiovascular: Negative for chest pain.   Gastrointestinal: Negative for abdominal pain, diarrhea, nausea and vomiting.   Genitourinary: Negative for dysuria.   Musculoskeletal: Negative for myalgias.   Skin: Negative for rash and wound.   Neurological: Positive for weakness. Negative for dizziness, light-headedness and headaches.     Objective:     Vital Signs (Most Recent):  Temp: 96.9 °F (36.1 °C) (03/14/19 0738)  Pulse: 97 (03/14/19 0906)  Resp: 20 (03/14/19 0906)  BP: 127/60 (03/14/19 0738)  SpO2: (!) 92 % (03/14/19 0906) Vital Signs (24h Range):  Temp:  [96.9 °F (36.1 °C)-97.9 °F (36.6 °C)] 96.9 °F (36.1 °C)  Pulse:  [] 97  Resp:  [16-26] 20  SpO2:  [88 %-100 %] 92 %  BP: (100-157)/(51-76) 127/60      Weight: 73.9 kg (162 lb 14.7 oz)  Body mass index is 27.97 kg/m².      Intake/Output Summary (Last 24 hours) at 3/14/2019 1004  Last data filed at 3/14/2019 0600  Gross per 24 hour   Intake 350 ml   Output 500 ml   Net -150 ml       Physical Exam   Constitutional: She is oriented to person, place, and time. She appears well-developed and well-nourished. No distress.   Somnolent   HENT:   Head: Normocephalic and atraumatic.   Eyes: No scleral icterus.   Neck: Neck supple.   Cardiovascular: Normal rate and regular rhythm.   Pulmonary/Chest: Effort normal. No stridor. She has no wheezes. She has rales (R>L).   Abdominal: Soft. Bowel sounds are normal. She exhibits no distension. There is no tenderness. There is no guarding.   Musculoskeletal: She exhibits no edema, tenderness or deformity.   Neurological: She is alert and oriented to person, place, and time.   Skin: Skin is warm and dry. She is not diaphoretic. No erythema. No pallor.   Nursing note and vitals reviewed.      Vents:       Lines/Drains/Airways     Peripheral Intravenous Line                 Peripheral IV - Single Lumen 12/28/18 0743 Right Antecubital 76 days         Peripheral IV - Single Lumen 03/13/19 1305 Left Antecubital less than 1 day                Significant Labs:    CBC/Anemia Profile:  Recent Labs   Lab 03/13/19  1312 03/14/19  0832   WBC 14.37* 12.29   HGB 12.1 10.9*   HCT 38.4 34.9*    274   MCV 86 88   RDW 14.2 14.2        Chemistries:  Recent Labs   Lab 03/13/19  1312 03/14/19  0832   * 133*   K 4.5 4.3   CL 99 100   CO2 23 22*   BUN 19 19   CREATININE 1.5* 1.3   CALCIUM 9.9 9.0   ALBUMIN  --  2.8*   PROT  --  6.2   BILITOT  --  0.3   ALKPHOS  --  74   ALT  --  13   AST  --  11   MG  --  1.7   PHOS  --  3.5       Significant Imaging:   CXR  Grossly stable abnormal radiographic appearance of the chest as above when compared to 03/11/2019 exam.

## 2019-03-14 NOTE — SUBJECTIVE & OBJECTIVE
Interval History: Pt reports improvement in SOB and weakness. Tolerating diet. Instructions to take prednisone 60mg daily per pulm. Denies any fever, chills, N/V/D, CP. Mild productive cough.    Review of Systems   Constitutional: Negative for activity change, chills, fatigue and fever.   HENT: Negative for sore throat.    Eyes: Negative for visual disturbance.   Respiratory: Positive for cough and shortness of breath.    Cardiovascular: Negative for chest pain.   Gastrointestinal: Negative for abdominal pain, diarrhea, nausea and vomiting.   Genitourinary: Negative for dysuria.   Musculoskeletal: Negative for myalgias.   Skin: Negative for rash and wound.   Neurological: Positive for weakness. Negative for dizziness, light-headedness and headaches.     Objective:     Vital Signs (Most Recent):  Temp: 97.9 °F (36.6 °C) (03/14/19 1524)  Pulse: 82 (03/14/19 1529)  Resp: 18 (03/14/19 1529)  BP: (!) 144/67 (03/14/19 1524)  SpO2: (!) 94 % (03/14/19 1529) Vital Signs (24h Range):  Temp:  [96.9 °F (36.1 °C)-98 °F (36.7 °C)] 97.9 °F (36.6 °C)  Pulse:  [] 82  Resp:  [16-26] 18  SpO2:  [86 %-100 %] 94 %  BP: (102-157)/(53-76) 144/67     Weight: 73.9 kg (162 lb 14.7 oz)  Body mass index is 27.97 kg/m².    Intake/Output Summary (Last 24 hours) at 3/14/2019 1556  Last data filed at 3/14/2019 0600  Gross per 24 hour   Intake 250 ml   Output 500 ml   Net -250 ml      Physical Exam   Constitutional: She is oriented to person, place, and time. She appears well-developed and well-nourished. No distress.   Frail, well-appearing, somnolent   HENT:   Head: Normocephalic and atraumatic.   Eyes: No scleral icterus.   Neck: Neck supple.   Cardiovascular: Normal rate and regular rhythm.   Pulmonary/Chest: Effort normal. No stridor. She has no wheezes. She has rales (LLL, LML).   Diminished breath sounds in RLL   Abdominal: Soft. Bowel sounds are normal. She exhibits no distension. There is no tenderness. There is no guarding.    Musculoskeletal: She exhibits no edema, tenderness or deformity.   Neurological: She is alert and oriented to person, place, and time.   Skin: Skin is warm and dry. She is not diaphoretic. No erythema. No pallor.   Nursing note and vitals reviewed.      Significant Labs:   BMP:   Recent Labs   Lab 03/14/19  0832   *   *   K 4.3      CO2 22*   BUN 19   CREATININE 1.3   CALCIUM 9.0   MG 1.7     CBC:   Recent Labs   Lab 03/13/19  1312 03/14/19  0832   WBC 14.37* 12.29   HGB 12.1 10.9*   HCT 38.4 34.9*    274     CMP:   Recent Labs   Lab 03/13/19  1312 03/14/19  0832   * 133*   K 4.5 4.3   CL 99 100   CO2 23 22*   * 183*   BUN 19 19   CREATININE 1.5* 1.3   CALCIUM 9.9 9.0   PROT  --  6.2   ALBUMIN  --  2.8*   BILITOT  --  0.3   ALKPHOS  --  74   AST  --  11   ALT  --  13   ANIONGAP 12 11   EGFRNONAA 32.4* 38.6*     Respiratory Culture: No results for input(s): GSRESP, RESPIRATORYC in the last 48 hours.    Significant Imaging: I have reviewed all pertinent imaging results/findings within the past 24 hours.

## 2019-03-14 NOTE — NURSING
Pt. Arrived on the unit from ED awake alert and oriented x4 nad noted , denies any discomforts, vital signs stable,   NSR on telemetry, pt. Oriented to her room assignment, equipment, staff and current plan of care...

## 2019-03-14 NOTE — HPI
83 year old woman with hx of cryptogenic organizing pneumonia, seropositive RA, HTN, CVA presents with 5 days of increasing SOB, minimally productive cough, and generalized weakness. She is on plaquenil, sulfasalazine, and prednisone for her RA. She has multiple prior ICU admissions attributed to . She has been on a prednisone taper since being discharged from the hospital in Jan 2019, tapered down to 10mg prednisone daily last week, which is when her SOB and lethargy worsened. Daughter states that her symptoms normally worsen when she tapers down to 10mg of prednisone. She was evaluated in pulm clinic on 2/4 while taking prednisone 25mg daily at that time with improving respiratory status.     She went to urgent care two days ago, CXR showed new airspace opacities and was started on levaquin. Daughter states they increased the prednisone back to 15mg daily. Pt was evaluated by Dr. Meraz in pulm clinic today and sent to ED for inpatient steroids, antibiotics, and supplemental O2. SpO2 85-88% at rest. Pt denies fever, chills, or other associated symptoms. Not on home O2.

## 2019-03-14 NOTE — ASSESSMENT & PLAN NOTE
- On home plaquenil, sulfasalazine, and prednisone daily for RA  - see seropositive RA  - hold sulfasalazine until complete course of levaquin

## 2019-03-14 NOTE — ASSESSMENT & PLAN NOTE
Recs:   -Con't prednisone; can decrease to 60mg  (1mg/kg IBW) to continue until outpatient follow up in Pulm clinic - we will set this up.  -Needs pulmonary clinic follow up with 2-view CXR in 1 month.  -Needs 6MWT to quantify O2 requirements prior to discharge.  -Con't antibiotics to cover for possible infection- recommend levofloxacin 750mg to complete a 7-day course.

## 2019-03-14 NOTE — CONSULTS
Full consult note to follow.    Recommendations:    -Con't prednisone; can decrease to 60mg  (1mg/kg IBW) to continue until outpatient follow up in Pulm clinic.  -Needs pulmonary clinic follow up with 2-view CXR in 1 month.  -Needs 6MWT to quantify O2 requirements prior to discharge.  -Con't antibiotics to cover for possible infection- recommend levofloxacin 750mg to complete a 7-day course.    Sadia Bennett 03/14/2019 10:07 AM  [employer] [facility] Fellow      Dr. Lux's diagnosis is ILD- . She desaturated during ambulation and will require home oxygen at discharge.     Sadia Bennett 03/14/2019 4:52 PM  [employer] [facility] Fellow

## 2019-03-14 NOTE — DISCHARGE SUMMARY
[facility] Hospital Medicine  Discharge Summary      Patient Name: Selma Rosado MD Jose J  MRN: [id number]  Admission Date: 3/13/2019  Hospital Length of Stay: 1 days  Discharge Date and Time:  03/14/2019 1:17 PM  Attending Physician: Sejal Nelson MD   Discharging Provider: Dom Street MD  Primary Care Provider: Bhargav Hirsch MD  [facility] Medicine Team: [facility] 2 Dom Street MD    HPI:   83 year old woman with hx of cryptogenic organizing pneumonia secondary to seropositive RA, HTN, CVA presents with 5 days of increasing SOB, minimally productive cough, and generalized weakness. She is on plaquenil, sulfasalazine, and prednisone for her RA. She has multiple prior ICU admissions attributed to . She has been on a prednisone taper since being discharged from the hospital in Jan 2019, tapered down to 10mg prednisone daily last week, which is when her SOB and lethargy worsened. Daughter states that her symptoms normally worsen when she tapers down to 10mg of prednisone. She was evaluated in pulm clinic on 2/4 while taking prednisone 25mg daily at that time with improving respiratory status.   She went to urgent care two days ago, CXR showed new airspace opacities and was started on levaquin. Daughter states they increased the prednisone back to 15mg daily. Pt was evaluated by Dr. Meraz in pulm clinic today and sent to ED for inpatient steroids, antibiotics, and supplemental O2. SpO2 85-88% at rest. Pt denies fever, chills, or other associated symptoms. Not on home O2.    * No surgery found *      Hospital Course:   Admitted to IM2. Infiltrates on CXR due to  2/2 rheumatoid arthritis with  concern for superimposed CAP. Given 1 dose of solumedrol 125mg on admit, started prednisone 60mg per pulmonology recs. Initially started on vanc and zosyn for broad coverage, narrowed coverage the following day; pt instructed to finish out her 5 days of levaquin PO at home  to complete treatment for CAP. F/u RVP. Instructed to continue plaquenil but hold sulfasalazine until completes levaquin course. Pt to follow up in pulm clinic and rheum clinic in 1 month. CXR ordered in 1 month. On 6 minute walk test, pt was satting 86% on RA. 94% on 2L. Qualifies for home O2.      Vitals:    03/14/19 0906 03/14/19 1131 03/14/19 1257 03/14/19 1300   BP:   (!) 122/58    BP Location:       Patient Position:   Sitting    Pulse: 97 86 85    Resp: 20  16    Temp:   98 °F (36.7 °C)    TempSrc:   Oral    SpO2: (!) 92%  (!) 86% (!) 94%   Weight:       Height:         Physical Exam   Constitutional: She is oriented to person, place, and time. She appears well-developed and well-nourished. No distress.   Frail, well-appearing  HENT:   Head: Normocephalic and atraumatic.   Eyes: No scleral icterus.   Neck: Neck supple.   Cardiovascular: Normal rate and regular rhythm.   Pulmonary/Chest: Effort normal. No stridor. She has no wheezes. She has rales (LLL). Diminished breath sounds in RLL.  Abdominal: Soft. Bowel sounds are normal. She exhibits no distension. There is no tenderness. There is no guarding.   Musculoskeletal: She exhibits no edema, tenderness or deformity.   Neurological: She is alert and oriented to person, place, and time.   Skin: Skin is warm and dry. She is not diaphoretic. No erythema. No pallor.   Nursing note and vitals reviewed.    Consults:   Consults (From admission, onward)        Status Ordering Provider     Inpatient consult to Pulmonology  Once     Provider:  (Not yet assigned)    Completed MATEO RASHID          * Cryptogenic organizing pneumonia    - Hx of recurring infiltrates on CXR due to  2/2 rheumatoid arthritis with multiple prior admissions.  - Infiltrates currently seen in RLL, LML, LLL.  - Symptoms typically worsen when prednisone gets down to 10mg daily  - Consulted pulmonology. Appreciate recs.  - Given 1 dose of solumedrol 125mg on admit, started prednisone  60mg per pulmonology recs.  - Incentive spirometry and flutter valve pulm toilet treatment prn  - Duonebs q4 wake  - continue home singulair   - Vancomycin and zosyn for empiric pneumonia coverage- narrowed to complete total of 7 day course of levaquin that was started outpatient.  - Follow up resp viral panel, sputum culture  - O2 NC prn  - Continue plaquenil 200mg BID. Hold sulfasalazine until complete course of levaquin.  - Pt to follow up in pulm clinic and rheum clinic in 1 month. CXR ordered in 1 month.   - On 6 minute walk test, pt was satting 86% on RA. 94% on 2L. Qualifies for home O2.      CAP (community acquired pneumonia)    - possible CAP superimposed on   - Initially started on vanc and zosyn for broad coverage, narrowed coverage the following day; pt instructed to finish out her 5 days of levaquin PO at home to complete treatment for CAP.   - F/u RVP     KERMIT (acute kidney injury)    - Cr 1.5-> 1.3  - Baseline Cr 1.1  - 1L bolus NS  - Renally dose antibiotics. Avoid nephrotoxic medications.  - Continue to monitor with hydration     Seropositive rheumatoid arthritis of multiple sites    - On home plaquenil, sulfasalazine, and prednisone daily for RA  -  2/2 RA  - continue plaquenil 200mg BID. Hold sulfasalazine until complete course of levaquin.  - follows with Dr. Rouse rheumatology. F/u in rheum clinic in 1 month.     Dystonia    - follows with Dr. Giang in neurology  - continue home gabapentin 300mg qhs     Long-term use of immunosuppressant medication    - On home plaquenil, sulfasalazine, and prednisone daily for RA  - see seropositive RA  - hold sulfasalazine until complete course of levaquin     History of stroke    - On ASA, plavix daily  - Wheelchair bound     Essential hypertension    - continue home coreg  - hold lasix due to KERMIT       Final Active Diagnoses:    Diagnosis Date Noted POA    PRINCIPAL PROBLEM:  Cryptogenic organizing pneumonia [J84.116] 01/24/2019 Yes    CAP  "(community acquired pneumonia) [J18.9] 03/14/2019 Yes    KERMIT (acute kidney injury) [N17.9] 03/13/2019 Yes    Seropositive rheumatoid arthritis of multiple sites [M05.79] 10/21/2015 Yes    Long-term use of immunosuppressant medication [Z79.899] 12/16/2018 Not Applicable    Dystonia [G24.9] 12/16/2018 Yes    History of stroke [Z86.73] 12/12/2017 Not Applicable    Essential hypertension [I10] 06/16/2016 Yes      Problems Resolved During this Admission:       Discharged Condition: stable    Disposition: Home or Self Care    Follow Up:  Follow-up Information     [facility] In 1 week.    Specialty:  Pulmonology  Contact information:  [address] [place] [address] [id number] [phone]               Patient Instructions:      OXYGEN FOR HOME USE     Order Specific Question Answer Comments   Liter Flow 2    Duration Continuous    Qualifying SpO2: 86% room air    Testing done at: Rest    Route nasal cannula    Portable mode: continuous    Device home concentrator with portable unit    Length of need (in months): 3 mos    Patient condition with qualifying saturation other chronic pulmonary condition    Height: 5' 4" (1.626 m)    Weight: 73.9 kg (162 lb 14.7 oz)    Does patient have medical equipment at home? walker, rolling    Alternative treatment measures have been tried or considered and deemed clinically ineffective. Yes      X-Ray Chest PA And Lateral   Standing Status: Future Standing Exp. Date: 03/14/20     Order Specific Question Answer Comments   May the Radiologist modify the order per protocol to meet the clinical needs of the patient? Yes      X-Ray Chest PA And Lateral   Standing Status: Future Standing Exp. Date: 03/14/20     Order Specific Question Answer Comments   May the Radiologist modify the order per protocol to meet the clinical needs of the patient? Yes      Ambulatory consult to Pulmonology   Referral Priority: Routine Referral Type: Consultation   Referral Reason: " Specialty Services Required   Requested Specialty: Pulmonary Disease   Number of Visits Requested: 1       Significant Diagnostic Studies: Labs:   BMP:   Recent Labs   Lab 03/13/19  1312 03/14/19  0832   * 183*   * 133*   K 4.5 4.3   CL 99 100   CO2 23 22*   BUN 19 19   CREATININE 1.5* 1.3   CALCIUM 9.9 9.0   MG  --  1.7   , CMP   Recent Labs   Lab 03/13/19  1312 03/14/19  0832   * 133*   K 4.5 4.3   CL 99 100   CO2 23 22*   * 183*   BUN 19 19   CREATININE 1.5* 1.3   CALCIUM 9.9 9.0   PROT  --  6.2   ALBUMIN  --  2.8*   BILITOT  --  0.3   ALKPHOS  --  74   AST  --  11   ALT  --  13   ANIONGAP 12 11   ESTGFRAFRICA 37.4* 44.5*   EGFRNONAA 32.4* 38.6*    and CBC   Recent Labs   Lab 03/13/19  1312 03/14/19  0832   WBC 14.37* 12.29   HGB 12.1 10.9*   HCT 38.4 34.9*    274       Pending Diagnostic Studies:     None         Medications:  Reconciled Home Medications:      Medication List      CHANGE how you take these medications    diazePAM 2 MG tablet  Commonly known as:  VALIUM  Take 1 tablet (2 mg total) by mouth as needed for Anxiety.  What changed:  when to take this     predniSONE 20 MG tablet  Commonly known as:  DELTASONE  Take 3 tablets (60 mg total) by mouth once daily.  Start taking on:  3/15/2019  What changed:    · medication strength  · how much to take  · additional instructions  · Another medication with the same name was removed. Continue taking this medication, and follow the directions you see here.     sulfaSALAzine 500 MG Tbec  Commonly known as:  AZULFIDINE  Take 2 tablets (1,000 mg total) by mouth 2 (two) times daily. HOLD UNTIL FINISHED WITH LEVOFLOXACIN, THEN RESUME TAKING  What changed:  additional instructions        CONTINUE taking these medications    acetaminophen 500 MG tablet  Commonly known as:  TYLENOL  Take 1,000 mg by mouth daily as needed for Pain.     albuterol 90 mcg/actuation inhaler  Commonly known as:  PROVENTIL/VENTOLIN HFA  Inhale 2 puffs into the  lungs every 6 (six) hours as needed for Wheezing. Rescue     aspirin 81 MG EC tablet  Commonly known as:  ECOTRIN  Take 81 mg by mouth once daily.     carvedilol 12.5 MG tablet  Commonly known as:  COREG  Take 1 tablet (12.5 mg total) by mouth 2 (two) times daily with meals.     ciclopirox 8 % Soln  Commonly known as:  PENLAC  Apply to affected nails qhs. Clean nails with alcohol q7 days.     clopidogrel 75 mg tablet  Commonly known as:  PLAVIX  TAKE 1 TABLET DAILY     furosemide 20 MG tablet  Commonly known as:  LASIX  Take 1 tablet (20 mg total) by mouth once daily.     gabapentin 300 MG capsule  Commonly known as:  NEURONTIN  Take 1 capsule (300 mg total) by mouth every evening.     hydroxychloroquine 200 mg tablet  Commonly known as:  PLAQUENIL  Take 1 tablet (200 mg total) by mouth 2 (two) times daily.     levoFLOXacin 750 MG tablet  Commonly known as:  LEVAQUIN  Take 1 tablet (750 mg total) by mouth once daily. for 7 days     linaclotide 145 mcg Cap capsule  Commonly known as:  LINZESS  Take 1 capsule (145 mcg total) by mouth once daily.     magnesium oxide 400 mg (241.3 mg magnesium) tablet  Commonly known as:  MAG-OX  Take 400 mg by mouth once daily.     montelukast 10 mg tablet  Commonly known as:  SINGULAIR  Take 1 tablet (10 mg total) by mouth every evening.     omeprazole 20 MG capsule  Commonly known as:  PRILOSEC  Take 1 capsule (20 mg total) by mouth once daily.     potassium chloride SA 10 MEQ tablet  Commonly known as:  K-DUR,KLOR-CON  Take 2 tablets (20 mEq total) by mouth once daily.     traMADol 50 mg tablet  Commonly known as:  ULTRAM  Take 1-2 tablets ( mg total) by mouth every 24 hours as needed for Pain.            Indwelling Lines/Drains at time of discharge:   Lines/Drains/Airways          None          Time spent on the discharge of patient: >30 minutes  Patient was seen and examined on the date of discharge and determined to be suitable for discharge.    Dom Street,  MD  Department of Hospital Medicine  [facility]

## 2019-03-14 NOTE — PROGRESS NOTES
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 She is on plaquenil, sulfasalazine, and prednisone for her RA. She has multiple prior ICU admissions attributed to . She has been on a prednisone taper since being discharged from the hospital in Jan 2019, tapered down to 10mg prednisone daily last week, which is when her SOB and lethargy worsened. Daughter states that her symptoms normally worsen when she tapers down to 10mg of prednisone. She was evaluated in pulm clinic on 2/4 while taking prednisone 25mg daily at that time with improving respiratory status.   She went to urgent care two days ago, CXR showed new airspace opacities and was started on levaquin. Daughter states they increased the prednisone back to 15mg daily. Pt was evaluated by Dr. Meraz in pulm clinic today and sent to ED for inpatient steroids, antibiotics, and supplemental O2. SpO2 85-88% at rest. Pt denies fever, chills, or other associated symptoms. Not on home O2.    Hospital Course:  Admitted to IM2. Infiltrates on CXR due to  2/2 rheumatoid arthritis with  concern for superimposed CAP. Given 1 dose of solumedrol 125mg on admit, started prednisone 60mg per pulmonology recs. Initially started on vanc and zosyn for broad coverage, narrowed coverage the following day; pt instructed to finish out her 5 days of levaquin PO at home to  complete treatment for CAP. F/u RVP. Instructed to continue plaquenil but hold sulfasalazine until completes levaquin course. Pt to follow up in pulm clinic and rheum clinic in 1 month. CXR ordered in 1 month. On 6 minute walk test, pt was satting 86% on RA. 94% on 2L. Qualifies for home O2.     Interval History: Pt reports improvement in SOB and weakness. Tolerating diet. Instructions to take prednisone 60mg daily per pulm. Denies any fever, chills, N/V/D, CP. Mild productive cough.    Review of Systems   Constitutional: Negative for activity change, chills, fatigue and fever.   HENT: Negative for sore throat.    Eyes: Negative for visual disturbance.   Respiratory: Positive for cough and shortness of breath.    Cardiovascular: Negative for chest pain.   Gastrointestinal: Negative for abdominal pain, diarrhea, nausea and vomiting.   Genitourinary: Negative for dysuria.   Musculoskeletal: Negative for myalgias.   Skin: Negative for rash and wound.   Neurological: Positive for weakness. Negative for dizziness, light-headedness and headaches.     Objective:     Vital Signs (Most Recent):  Temp: 97.9 °F (36.6 °C) (03/14/19 1524)  Pulse: 82 (03/14/19 1529)  Resp: 18 (03/14/19 1529)  BP: (!) 144/67 (03/14/19 1524)  SpO2: (!) 94 % (03/14/19 1529) Vital Signs (24h Range):  Temp:  [96.9 °F (36.1 °C)-98 °F (36.7 °C)] 97.9 °F (36.6 °C)  Pulse:  [] 82  Resp:  [16-26] 18  SpO2:  [86 %-100 %] 94 %  BP: (102-157)/(53-76) 144/67     Weight: 73.9 kg (162 lb 14.7 oz)  Body mass index is 27.97 kg/m².    Intake/Output Summary (Last 24 hours) at 3/14/2019 1556  Last data filed at 3/14/2019 0600  Gross per 24 hour   Intake 250 ml   Output 500 ml   Net -250 ml      Physical Exam   Constitutional: She is oriented to person, place, and time. She appears well-developed and well-nourished. No distress.   Frail, well-appearing, somnolent   HENT:   Head: Normocephalic and atraumatic.   Eyes: No scleral icterus.   Neck: Neck supple.    Cardiovascular: Normal rate and regular rhythm.   Pulmonary/Chest: Effort normal. No stridor. She has no wheezes. She has rales (LLL, LML).   Diminished breath sounds in RLL   Abdominal: Soft. Bowel sounds are normal. She exhibits no distension. There is no tenderness. There is no guarding.   Musculoskeletal: She exhibits no edema, tenderness or deformity.   Neurological: She is alert and oriented to person, place, and time.   Skin: Skin is warm and dry. She is not diaphoretic. No erythema. No pallor.   Nursing note and vitals reviewed.      Significant Labs:   BMP:   Recent Labs   Lab 03/14/19  0832   *   *   K 4.3      CO2 22*   BUN 19   CREATININE 1.3   CALCIUM 9.0   MG 1.7     CBC:   Recent Labs   Lab 03/13/19  1312 03/14/19  0832   WBC 14.37* 12.29   HGB 12.1 10.9*   HCT 38.4 34.9*    274     CMP:   Recent Labs   Lab 03/13/19  1312 03/14/19  0832   * 133*   K 4.5 4.3   CL 99 100   CO2 23 22*   * 183*   BUN 19 19   CREATININE 1.5* 1.3   CALCIUM 9.9 9.0   PROT  --  6.2   ALBUMIN  --  2.8*   BILITOT  --  0.3   ALKPHOS  --  74   AST  --  11   ALT  --  13   ANIONGAP 12 11   EGFRNONAA 32.4* 38.6*     Respiratory Culture: No results for input(s): GSRESP, RESPIRATORYC in the last 48 hours.    Significant Imaging: I have reviewed all pertinent imaging results/findings within the past 24 hours.    Assessment/Plan:      Cryptogenic organizing pneumonia    - Hx of recurring infiltrates on CXR due to  2/2 rheumatoid arthritis with multiple prior admissions.  - Infiltrates currently seen in RLL, LML, LLL.  - Symptoms typically worsen when prednisone gets down to 10mg daily  - Consulted pulmonology. Appreciate recs.  - Given 1 dose of solumedrol 125mg on admit, started prednisone 60mg per pulmonology recs.  - Incentive spirometry and flutter valve pulm toilet treatment prn  - Duonebs q4 wake  - continue home singulair   - Vancomycin and zosyn for empiric pneumonia coverage- narrowed  to complete total of 7 day course of levaquin that was started outpatient.  - Follow up resp viral panel, sputum culture  - O2 NC prn  - Continue plaquenil 200mg BID. Hold sulfasalazine until complete course of levaquin.  - Pt to follow up in pulm clinic and rheum clinic in 1 month. CXR ordered in 1 month.   - On 6 minute walk test, pt was satting 86% on RA. 94% on 2L. Qualifies for home O2.      CAP (community acquired pneumonia)    - possible CAP superimposed on   - Initially started on vanc and zosyn for broad coverage, narrowed coverage the following day; pt instructed to finish out her 5 days of levaquin PO at home to complete treatment for CAP.   - F/u RVP     KERMIT (acute kidney injury)    - Cr 1.5-> 1.3  - Baseline Cr 1.1  - 1L bolus NS  - Renally dose antibiotics. Avoid nephrotoxic medications.  - Continue to monitor with hydration     Seropositive rheumatoid arthritis of multiple sites    - On home plaquenil, sulfasalazine, and prednisone daily for RA  -  2/2 RA  - continue plaquenil 200mg BID. Hold sulfasalazine until complete course of levaquin.  - follows with Dr. Rouse rheumatology. F/u in rheum clinic in 1 month.     Dystonia    - follows with Dr. Giang in neurology  - continue home gabapentin 300mg qhs     Long-term use of immunosuppressant medication    - On home plaquenil, sulfasalazine, and prednisone daily for RA  - see seropositive RA  - hold sulfasalazine until complete course of levaquin     History of stroke    - On ASA, plavix daily  - Wheelchair bound     Essential hypertension    - continue home coreg  - hold lasix due to KERMIT       VTE Risk Mitigation (From admission, onward)        Ordered     IP VTE HIGH RISK PATIENT  Once      03/14/19 0239     heparin (porcine) injection 5,000 Units  Every 8 hours      03/13/19 1555        Dom Street MD  Department of Hospital Medicine   [facility]

## 2019-03-14 NOTE — PLAN OF CARE
On Discharge planning assessment patient is in bed, resting quietly, daughter at the bedside.  Introduced myself and CM role in patients care plan, patient verbalized understanding.     Pt lives in a single story home with 5 stairs at the entrance, she lives alone but has close family support.  Patient denies HD, H/H and coumadin. Pt has had Home Health with [facility] in the past. She has a BSC, rolling walker, W/C and shower chair for home use. Verified Pts Address, Emergency Contact, Pharmacy and PCP.     Pt denies any concerns for this visit, CM will continue to follow. CM name and number placed on patients white board.        03/14/19 2847   Discharge Assessment   Assessment Type Discharge Planning Assessment   Confirmed/corrected address and phone number on facesheet? Yes   Assessment information obtained from? Caregiver   Expected Length of Stay (days) 1   Communicated expected length of stay with patient/caregiver yes   Prior to hospitilization cognitive status: Alert/Oriented   Prior to hospitalization functional status: Assistive Equipment   Current cognitive status: Alert/Oriented   Current Functional Status: Assistive Equipment   Lives With alone   Able to Return to Prior Arrangements yes   Is patient able to care for self after discharge? Yes   Who are your caregiver(s) and their phone number(s)? Vania [phone]   Patient's perception of discharge disposition home or selfcare   Readmission Within the Last 30 Days no previous admission in last 30 days   Patient currently being followed by outpatient case management? No   Patient currently receives any other outside agency services? No   Equipment Currently Used at Home bedside commode;walker, rolling;shower chair;wheelchair   Do you have any problems affording any of your prescribed medications? No   Is the patient taking medications as prescribed? yes   Does the patient have transportation home? Yes   Transportation Anticipated family or  friend will provide   Does the patient receive services at the Coumadin Clinic? No   Discharge Plan A Home with family   Discharge Plan B Home Health   DME Needed Upon Discharge  none   Patient/Family in Agreement with Plan yes     Bhargav Hirsch MD  [address] [facility] [id number] [phone]    Extended Emergency Contact Information  Primary Emergency Contact: Vania Silveira  Address: [address] [place] [address] [facility]  Home Phone: [phone]  Relation: Sister  Secondary Emergency Contact: Madhavi Lomas Md   [facility]  Mobile Phone: [phone]  Relation: Daughter      [facility] - [place], [place] - [address] AT [facility] [address]  [address]  [place] [address]  Phone: [phone] Fax: [phone]    [facility] - [place] [id number] [facility]  [address] [facility] [address]  Phone: [phone] Fax: [phone]

## 2019-03-14 NOTE — ASSESSMENT & PLAN NOTE
- Hx of recurring infiltrates on CXR due to  2/2 rheumatoid arthritis with multiple prior admissions.  - Infiltrates currently seen in RLL, LML, LLL.  - Symptoms typically worsen when prednisone gets down to 10mg daily  - Consulted pulmonology. Appreciate recs.  - Given 1 dose of solumedrol 125mg on admit, started prednisone 60mg per pulmonology recs.  - Incentive spirometry and flutter valve pulm toilet treatment prn  - Duonebs q4 wake  - continue home singulair   - Vancomycin and zosyn for empiric pneumonia coverage- narrowed to complete total of 7 day course of levaquin that was started outpatient.  - Follow up resp viral panel, sputum culture  - O2 NC prn  - Continue plaquenil 200mg BID. Hold sulfasalazine until complete course of levaquin.  - Pt to follow up in pulm clinic and rheum clinic in 1 month. CXR ordered in 1 month.   - On 6 minute walk test, pt was satting 86% on RA. 94% on 2L. Qualifies for home O2.

## 2019-03-14 NOTE — PLAN OF CARE
Problem: Adult Inpatient Plan of Care  Goal: Plan of Care Review  Outcome: Ongoing (interventions implemented as appropriate)  Current plan of care and medication regimen reviewed with pt.

## 2019-03-14 NOTE — PLAN OF CARE
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 She is not typically on any home supplemental O2. Reports increased fatigue, productive cough over past 4-5 days which associated with recent decrease in prednisone to 10mg daily from 15mg the week prior.  She denies fever, chills, rigors, change in sputum production, chest pain or palpitations. Denies any worsening of her typical RA symptoms.      ROS: as per HPI;  Positive for fatigue, cough and dyspnea on exertion.  Negative for chest pain, palpitations, fever, chills, headache, N/V/D, abd pain.      PE:Afebrile. Alert oriented x 3, BS at right base with faint rales SKY/LLL.  S1S2 RRR, Abd soft NT/ND, no edema/erythema/pain in B/L LEs    CXR: shows bilateral patchy infiltrates RLL/SKY.    Labs: reviewed.      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  She presents with progressive fatigue, BEJARANO and cough over past week which was temporally related to a decrease in her steroids which are being tapered by 2.5mg every fortnight by her Rheumatologist Dr. Rouse. Labs demonstrate new leukocytosis and mildly elevated procalcitonin. Findings concerning for resurgence of her  but cannot exclude superimposed CAP.  Recommend Prednisone 1mg/kg starting tomorrow (rcvd methylpred 80mg), can likely transition to PO antibiotics, obtain respiratory culture and Albuterol nebs prn (patient noting limited benefit with the latter). Plan of care discussed with patient.  Will follow-up in the AM.    Marilin Holcomb MD  Pulmonary Staff

## 2019-03-14 NOTE — ASSESSMENT & PLAN NOTE
- Cr 1.5-> 1.3  - Baseline Cr 1.1  - 1L bolus NS  - Renally dose antibiotics. Avoid nephrotoxic medications.  - Continue to monitor with hydration

## 2019-03-14 NOTE — HOSPITAL COURSE
Admitted to IM2. Infiltrates on CXR due to  2/2 rheumatoid arthritis with concern for superimposed CAP. Given 1 dose of solumedrol 125mg on admit, started prednisone 60mg per pulmonology recs. Initially started on vanc and zosyn for broad coverage, narrowed coverage the following day; pt instructed to finish out her 5 days of levaquin PO at home to complete treatment for CAP. F/u RVP. Instructed to continue plaquenil but hold sulfasalazine until completes levaquin course. Pt to follow up in pulm clinic and rheum clinic in 1 month. CXR ordered in 1 month. On official 6 minute walk test, pt was satting 83% on RA, 93% on 4L at rest, 88% on 4L with exercise, 95% on 4L post-exercise. Pt has hx of ILD/ crytogenic organizing pneumonia, pulmonary HTN group 3. Weaned down to 1L NC, will attempt 6MWT again on 3/18. Pt participated in 6 MWT with therapist on 3/18, ambulating x 246 feet (74.98 meters) on room air with no seated rest breaks required. Pt's 02 sats remained between 89-92% upon exertion, and were stable at 92% following testing. Passed 6MWT, stable for discharge home with home health PT/OT services.

## 2019-03-14 NOTE — CONSULTS
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 She is on plaquenil, sulfasalazine, and prednisone for her RA. She has multiple prior ICU admissions attributed to . She has been on a prednisone taper since being discharged from the hospital in Jan 2019, tapered down to 10mg prednisone daily last week, which is when her SOB and lethargy worsened. Daughter states that her symptoms normally worsen when she tapers down to 10mg of prednisone. She was evaluated in pulm clinic on 2/4 while taking prednisone 25mg daily at that time with improving respiratory status.     She went to urgent care two days ago, CXR showed new airspace opacities and was started on levaquin. Daughter states they increased the prednisone back to 15mg daily. Pt was evaluated by Dr. Meraz in pulm clinic today and sent to ED for inpatient steroids, antibiotics, and supplemental O2. SpO2 85-88% at rest. Pt denies fever, chills, or other associated symptoms. Not on home O2.        Past Medical History:   Diagnosis Date    Altered mental status     Anemia     Arthritis     Bilateral hand pain 3/14/2018    Branch retinal vein occlusion, left eye 2/20/2015    Chronic bilateral low back pain without sciatica 3/23/2017    Cognitive communication deficit 12/19/2017    Cystoid macular edema, left eye 2/20/2015    Cystoid macular edema, left eye 2/20/2015    Delirium superimposed on dementia     DJD (degenerative  joint disease) of cervical spine 5/15/2013    Fatty liver 8/26/2014    Goiter 4/9/2018    Hashimoto's disease     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    Hypertension     Hypertensive encephalopathy     IBS (irritable bowel syndrome) 6/21/2017    IGT (impaired glucose tolerance) 1/12/2016    Iron deficiency anemia secondary to inadequate dietary iron intake 6/24/2013    Joint pain     Keratoconjunctivitis sicca of both eyes not specified as Sjogren's 7/29/2016    Leiomyoma of uterus, unspecified 9/16/2014    Long QT interval 6/29/2016    Due to medication (plaquenil)     Macular edema 1/12/2015    Multinodular goiter 1/12/2016    Neuropathy 8/23/2017    Plaquenil causing adverse effect in therapeutic use 10/7/2016    Pneumococcal vaccine refused 8/17/2016    Pneumonia due to Streptococcus pneumoniae 4/5/2018    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/2015    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    s/p Right Total knee replacement 5/15/2013    Scoliosis of thoracic spine 5/15/2013    Small vessel disease, cerebrovascular 12/28/2017    Stroke     UTI (urinary tract infection) 12/29/2018    Vascular dementia 12/6/2017       Past Surgical History:   Procedure Laterality Date    BREAST CYST EXCISION      CATARACT EXTRACTION      COLONOSCOPY N/A 9/29/2015    Performed by [facility]ELINA [facility] (4TH FLR)    EGD (ESOPHAGOGASTRODUODENOSCOPY) N/A 3/11/2014    Performed by Federico Escobar [facility] at [facility] (4TH FLR)    EGD (ESOPHAGOGASTRODUODENOSCOPY) N/A 11/5/2013    Performed by Fed[facility]ico Escobar MD at [facility] (4TH FLR)    ESOPHAGOGASTRODUODENOSCOPY (EGD) N/A 10/4/2017    Performed by Mg Morton MD at [facility] ENDO    EYE SURGERY      INJECTION-STEROID-EPIDURAL-TRANSFORAMINAL Right 9/20/2017    Performed by Joselin Valdez [facility] at [facility] PAIN MGT    JOINT REPLACEMENT      right knee    KNEE SURGERY Left 12/31/2013    TKR    left parotidectomy      mixed tumor    SALIVARY GLAND  SURGERY      TONSILLECTOMY         Review of patient's allergies indicates:  No Known Allergies    Family History     Problem Relation (Age of Onset)    Breast cancer Maternal Grandmother    Cancer Father    Heart disease Mother    Hypertension Mother    Prostate cancer Father        Tobacco Use    Smoking status: Never Smoker    Smokeless tobacco: Never Used   Substance and Sexual Activity    Alcohol use: No     Alcohol/week: 0.0 oz     Comment: very seldom     Drug use: No    Sexual activity: No     Comment: ,  age 52,          Review of Systems   Constitutional: Positive for activity change and fatigue. Negative for chills and fever.   HENT: Negative for congestion, postnasal drip, rhinorrhea and sore throat.    Respiratory: Positive for shortness of breath.    Cardiovascular: Negative for chest pain.   Gastrointestinal: Negative for abdominal pain, diarrhea, nausea and vomiting.   Genitourinary: Negative for dysuria.   Musculoskeletal: Negative for myalgias.   Skin: Negative for rash and wound.   Neurological: Positive for weakness. Negative for dizziness, light-headedness and headaches.     Objective:     Vital Signs (Most Recent):  Temp: 96.9 °F (36.1 °C) (03/14/19 0738)  Pulse: 97 (03/14/19 0906)  Resp: 20 (03/14/19 0906)  BP: 127/60 (03/14/19 0738)  SpO2: (!) 92 % (03/14/19 0906) Vital Signs (24h Range):  Temp:  [96.9 °F (36.1 °C)-97.9 °F (36.6 °C)] 96.9 °F (36.1 °C)  Pulse:  [] 97  Resp:  [16-26] 20  SpO2:  [88 %-100 %] 92 %  BP: (100-157)/(51-76) 127/60     Weight: 73.9 kg (162 lb 14.7 oz)  Body mass index is 27.97 kg/m².      Intake/Output Summary (Last 24 hours) at 3/14/2019 1004  Last data filed at 3/14/2019 0600  Gross per 24 hour   Intake 350 ml   Output 500 ml   Net -150 ml       Physical Exam   Constitutional: She is oriented to person, place, and time. She appears well-developed and well-nourished. No distress.   Somnolent   HENT:   Head: Normocephalic and atraumatic.   Eyes:  No scleral icterus.   Neck: Neck supple.   Cardiovascular: Normal rate and regular rhythm.   Pulmonary/Chest: Effort normal. No stridor. She has no wheezes. She has rales (R>L).   Abdominal: Soft. Bowel sounds are normal. She exhibits no distension. There is no tenderness. There is no guarding.   Musculoskeletal: She exhibits no edema, tenderness or deformity.   Neurological: She is alert and oriented to person, place, and time.   Skin: Skin is warm and dry. She is not diaphoretic. No erythema. No pallor.   Nursing note and vitals reviewed.      Vents:       Lines/Drains/Airways     Peripheral Intravenous Line                 Peripheral IV - Single Lumen 12/28/18 0743 Right Antecubital 76 days         Peripheral IV - Single Lumen 03/13/19 1305 Left Antecubital less than 1 day                Significant Labs:    CBC/Anemia Profile:  Recent Labs   Lab 03/13/19  1312 03/14/19  0832   WBC 14.37* 12.29   HGB 12.1 10.9*   HCT 38.4 34.9*    274   MCV 86 88   RDW 14.2 14.2        Chemistries:  Recent Labs   Lab 03/13/19  1312 03/14/19  0832   * 133*   K 4.5 4.3   CL 99 100   CO2 23 22*   BUN 19 19   CREATININE 1.5* 1.3   CALCIUM 9.9 9.0   ALBUMIN  --  2.8*   PROT  --  6.2   BILITOT  --  0.3   ALKPHOS  --  74   ALT  --  13   AST  --  11   MG  --  1.7   PHOS  --  3.5       Significant Imaging:   CXR  Grossly stable abnormal radiographic appearance of the chest as above when compared to 03/11/2019 exam.        Assessment/Plan:     Cryptogenic organizing pneumonia        Recs:   -Con't prednisone; can decrease to 60mg  (1mg/kg IBW) to continue until outpatient follow up in Pulm clinic - we will set this up.  -Needs pulmonary clinic follow up with 2-view CXR in 1 month.  -Needs 6MWT to quantify O2 requirements prior to discharge.  -Con't antibiotics to cover for possible infection- recommend levofloxacin 750mg to complete a 7-day course.           Thank you for your consult. I will follow-up with patient. Please  contact us if you have any additional questions.     Rod Riggins MD  Pulmonology  [facility]

## 2019-03-14 NOTE — HOSPITAL COURSE
03/14/2019 - Differentiating possible adrenal insufficiency from tapering steroids too fast vs worsening symptomatology of .  See assessment and plan for further details.  03/15/2019 - Pt today with complaint of worsened lethargy, oxy sats mid to upper 90s on 2lpm

## 2019-03-14 NOTE — NURSING

## 2019-03-14 NOTE — ASSESSMENT & PLAN NOTE
- possible CAP superimposed on   - Initially started on vanc and zosyn for broad coverage, narrowed coverage the following day; pt instructed to finish out her 5 days of levaquin PO at home to complete treatment for CAP.   - F/u RVP

## 2019-03-15 ENCOUNTER — HOSPITAL ENCOUNTER (OUTPATIENT)
Dept: PULMONOLOGY | Facility: CLINIC | Age: 84
Discharge: HOME | DRG: 196 | End: 2019-03-15
Payer: MEDICARE

## 2019-03-15 LAB
ALBUMIN SERPL BCP-MCNC: 2.9 G/DL
ALP SERPL-CCNC: 65 U/L
ALT SERPL W/O P-5'-P-CCNC: 18 U/L
ANION GAP SERPL CALC-SCNC: 9 MMOL/L
ANISOCYTOSIS BLD QL SMEAR: SLIGHT
AST SERPL-CCNC: 20 U/L
BASOPHILS # BLD AUTO: 0.02 K/UL
BASOPHILS NFR BLD: 0.2 %
BILIRUB SERPL-MCNC: 0.2 MG/DL
BUN SERPL-MCNC: 22 MG/DL
BURR CELLS BLD QL SMEAR: ABNORMAL
CALCIUM SERPL-MCNC: 9.2 MG/DL
CHLORIDE SERPL-SCNC: 107 MMOL/L
CO2 SERPL-SCNC: 22 MMOL/L
CREAT SERPL-MCNC: 1.2 MG/DL
DIFFERENTIAL METHOD: ABNORMAL
ENTEROVIRUS: NOT DETECTED
EOSINOPHIL # BLD AUTO: 0.1 K/UL
EOSINOPHIL NFR BLD: 0.5 %
ERYTHROCYTE [DISTWIDTH] IN BLOOD BY AUTOMATED COUNT: 14.2 %
EST. GFR  (AFRICAN AMERICAN): 49 ML/MIN/1.73 M^2
EST. GFR  (NON AFRICAN AMERICAN): 42.5 ML/MIN/1.73 M^2
GLUCOSE SERPL-MCNC: 78 MG/DL
HCT VFR BLD AUTO: 34.4 %
HGB BLD-MCNC: 10.5 G/DL
HUMAN BOCAVIRUS: NOT DETECTED
HUMAN CORONAVIRUS, COMMON COLD VIRUS: NOT DETECTED
HYPOCHROMIA BLD QL SMEAR: ABNORMAL
IMM GRANULOCYTES # BLD AUTO: 0.07 K/UL
IMM GRANULOCYTES NFR BLD AUTO: 0.6 %
INFLUENZA A - H1N1-09: NOT DETECTED
LYMPHOCYTES # BLD AUTO: 1.8 K/UL
LYMPHOCYTES NFR BLD: 14.4 %
MAGNESIUM SERPL-MCNC: 1.8 MG/DL
MCH RBC QN AUTO: 27.2 PG
MCHC RBC AUTO-ENTMCNC: 30.5 G/DL
MCV RBC AUTO: 89 FL
MONOCYTES # BLD AUTO: 2.5 K/UL
MONOCYTES NFR BLD: 19.7 %
NEUTROPHILS # BLD AUTO: 8.2 K/UL
NEUTROPHILS NFR BLD: 64.6 %
NRBC BLD-RTO: 0 /100 WBC
OVALOCYTES BLD QL SMEAR: ABNORMAL
PARAINFLUENZA: NOT DETECTED
PHOSPHATE SERPL-MCNC: 2.8 MG/DL
PLATELET # BLD AUTO: 286 K/UL
PLATELET BLD QL SMEAR: ABNORMAL
PMV BLD AUTO: 9.5 FL
POCT GLUCOSE: 214 MG/DL (ref 70–110)
POIKILOCYTOSIS BLD QL SMEAR: SLIGHT
POLYCHROMASIA BLD QL SMEAR: ABNORMAL
POTASSIUM SERPL-SCNC: 4.2 MMOL/L
PROT SERPL-MCNC: 6.3 G/DL
RBC # BLD AUTO: 3.86 M/UL
RVP - ADENOVIRUS: NOT DETECTED
RVP - HUMAN METAPNEUMOVIRUS (HMPV): NOT DETECTED
RVP - INFLUENZA A: NOT DETECTED
RVP - INFLUENZA B: NOT DETECTED
RVP - RESPIRATORY SYNCTIAL VIRUS (RSV) A: NOT DETECTED
RVP - RESPIRATORY VIRAL PANEL, SOURCE: NORMAL
RVP - RHINOVIRUS: NOT DETECTED
SODIUM SERPL-SCNC: 138 MMOL/L
WBC # BLD AUTO: 12.61 K/UL

## 2019-03-15 PROCEDURE — 94618 PULMONARY STRESS TESTING: CPT | Performed by: INTERNAL MEDICINE

## 2019-03-15 PROCEDURE — 99232 PR SUBSEQUENT HOSPITAL CARE,LEVL II: ICD-10-PCS | Mod: GC,,, | Performed by: INTERNAL MEDICINE

## 2019-03-15 PROCEDURE — 99232 SBSQ HOSP IP/OBS MODERATE 35: CPT | Mod: GC,,, | Performed by: INTERNAL MEDICINE

## 2019-03-15 PROCEDURE — 25000242 PHARM REV CODE 250 ALT 637 W/ HCPCS: Performed by: INTERNAL MEDICINE

## 2019-03-15 PROCEDURE — 63600175 PHARM REV CODE 636 W HCPCS: Performed by: INTERNAL MEDICINE

## 2019-03-15 PROCEDURE — 80053 COMPREHEN METABOLIC PANEL: CPT

## 2019-03-15 PROCEDURE — 20600001 HC STEP DOWN PRIVATE ROOM

## 2019-03-15 PROCEDURE — 27000221 HC OXYGEN, UP TO 24 HOURS

## 2019-03-15 PROCEDURE — 97530 THERAPEUTIC ACTIVITIES: CPT

## 2019-03-15 PROCEDURE — 36415 COLL VENOUS BLD VENIPUNCTURE: CPT

## 2019-03-15 PROCEDURE — 94640 AIRWAY INHALATION TREATMENT: CPT

## 2019-03-15 PROCEDURE — 85025 COMPLETE CBC W/AUTO DIFF WBC: CPT

## 2019-03-15 PROCEDURE — 97161 PT EVAL LOW COMPLEX 20 MIN: CPT

## 2019-03-15 PROCEDURE — 25000003 PHARM REV CODE 250: Performed by: INTERNAL MEDICINE

## 2019-03-15 PROCEDURE — 94761 N-INVAS EAR/PLS OXIMETRY MLT: CPT

## 2019-03-15 PROCEDURE — 83735 ASSAY OF MAGNESIUM: CPT

## 2019-03-15 PROCEDURE — 84100 ASSAY OF PHOSPHORUS: CPT

## 2019-03-15 RX ADMIN — HEPARIN SODIUM 5000 UNITS: 5000 INJECTION, SOLUTION INTRAVENOUS; SUBCUTANEOUS at 10:03

## 2019-03-15 RX ADMIN — CARVEDILOL 12.5 MG: 12.5 TABLET, FILM COATED ORAL at 06:03

## 2019-03-15 RX ADMIN — CARVEDILOL 12.5 MG: 12.5 TABLET, FILM COATED ORAL at 09:03

## 2019-03-15 RX ADMIN — HYDROXYCHLOROQUINE SULFATE 200 MG: 200 TABLET, FILM COATED ORAL at 09:03

## 2019-03-15 RX ADMIN — PREDNISONE 60 MG: 20 TABLET ORAL at 02:03

## 2019-03-15 RX ADMIN — CLOPIDOGREL 75 MG: 75 TABLET, FILM COATED ORAL at 09:03

## 2019-03-15 RX ADMIN — GABAPENTIN 300 MG: 300 CAPSULE ORAL at 10:03

## 2019-03-15 RX ADMIN — HEPARIN SODIUM 5000 UNITS: 5000 INJECTION, SOLUTION INTRAVENOUS; SUBCUTANEOUS at 06:03

## 2019-03-15 RX ADMIN — IPRATROPIUM BROMIDE AND ALBUTEROL SULFATE 3 ML: .5; 3 SOLUTION RESPIRATORY (INHALATION) at 08:03

## 2019-03-15 RX ADMIN — ASPIRIN 81 MG: 81 TABLET, COATED ORAL at 09:03

## 2019-03-15 RX ADMIN — INSULIN ASPART 1 UNITS: 100 INJECTION, SOLUTION INTRAVENOUS; SUBCUTANEOUS at 10:03

## 2019-03-15 RX ADMIN — MONTELUKAST SODIUM 10 MG: 10 TABLET, FILM COATED ORAL at 10:03

## 2019-03-15 RX ADMIN — HYDROXYCHLOROQUINE SULFATE 200 MG: 200 TABLET, FILM COATED ORAL at 10:03

## 2019-03-15 RX ADMIN — HEPARIN SODIUM 5000 UNITS: 5000 INJECTION, SOLUTION INTRAVENOUS; SUBCUTANEOUS at 05:03

## 2019-03-15 NOTE — SUBJECTIVE & OBJECTIVE
Interval History: Patient stable overnight, reports improvement in pulm symptoms since starting high dose steroids.     Review of Systems  Objective:     Vital Signs (Most Recent):  Temp: 97.7 °F (36.5 °C) (03/15/19 0746)  Pulse: 85 (03/15/19 0746)  Resp: 20 (03/15/19 0746)  BP: (!) 146/64 (03/15/19 0746)  SpO2: 99 % (03/15/19 0746) Vital Signs (24h Range):  Temp:  [97.2 °F (36.2 °C)-99 °F (37.2 °C)] 97.7 °F (36.5 °C)  Pulse:  [80-97] 85  Resp:  [16-20] 20  SpO2:  [86 %-99 %] 99 %  BP: (122-164)/(58-72) 146/64     Weight: 73.9 kg (162 lb 14.7 oz)  Body mass index is 27.97 kg/m².    Intake/Output Summary (Last 24 hours) at 3/15/2019 0850  Last data filed at 3/15/2019 0500  Gross per 24 hour   Intake 240 ml   Output 600 ml   Net -360 ml      Physical Exam   Constitutional: She is oriented to person, place, and time. She appears well-developed and well-nourished. No distress.   Frail, well-appearing, somnolent   HENT:   Head: Normocephalic and atraumatic.   Eyes: No scleral icterus.   Neck: Neck supple.   Cardiovascular: Normal rate and regular rhythm.   Pulmonary/Chest: Effort normal. No stridor. She has no wheezes. She has rales (LLL, LML).   Diminished breath sounds in RLL   Abdominal: Soft. Bowel sounds are normal. She exhibits no distension. There is no tenderness. There is no guarding.   Musculoskeletal: She exhibits no edema, tenderness or deformity.   Neurological: She is alert and oriented to person, place, and time.   Skin: Skin is warm and dry. She is not diaphoretic. No erythema. No pallor.   Nursing note and vitals reviewed.      Significant Labs:   Blood Culture:   Recent Labs   Lab 03/13/19  1245 03/13/19  1310   LABBLOO No Growth to date  No Growth to date No Growth to date  No Growth to date     CBC:   Recent Labs   Lab 03/13/19  1312 03/14/19  0832 03/15/19  0621   WBC 14.37* 12.29 12.61   HGB 12.1 10.9* 10.5*   HCT 38.4 34.9* 34.4*    274 286     CMP:   Recent Labs   Lab 03/13/19  1312  03/14/19  0832 03/15/19  0621   * 133* 138   K 4.5 4.3 4.2   CL 99 100 107   CO2 23 22* 22*   * 183* 78   BUN 19 19 22   CREATININE 1.5* 1.3 1.2   CALCIUM 9.9 9.0 9.2   PROT  --  6.2 6.3   ALBUMIN  --  2.8* 2.9*   BILITOT  --  0.3 0.2   ALKPHOS  --  74 65   AST  --  11 20   ALT  --  13 18   ANIONGAP 12 11 9   EGFRNONAA 32.4* 38.6* 42.5*     Lipase: No results for input(s): LIPASE in the last 48 hours.  Magnesium:   Recent Labs   Lab 03/14/19  0832 03/15/19  0621   MG 1.7 1.8     All pertinent labs within the past 24 hours have been reviewed.    Significant Imaging: I have reviewed and interpreted all pertinent imaging results/findings within the past 24 hours.

## 2019-03-15 NOTE — PLAN OF CARE
Problem: Physical Therapy Goal  Goal: Physical Therapy Goal  Goals to be met by: 3/22/19    Patient will increase functional independence with mobility by performin. Supine to sit with Modified [place]  2. Sit to supine with Modified [place]  3. Sit to stand transfer with Modified [place]  4. Gait  x 100 feet with Stand-by Assistance using Rolling Walker.   5. Ascend/descend 5 stairs with right Handrails  With contact Guard Assistance.   6. Lower extremity exercise program x30 reps per handout, with independence    Outcome: Ongoing (interventions implemented as appropriate)  PT evaluation completed- see note for details. Goals established and POC initiated.     Laura Elkins, PT, DPT   3/15/2019  Pager: [phone]

## 2019-03-15 NOTE — PROGRESS NOTES
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      Intake/Output Summary (Last 24 hours) at 3/15/2019 0959  Last data filed at 3/15/2019 0900  Gross per 24 hour   Intake 240 ml   Output 901 ml   Net -661 ml       Physical Exam   Constitutional: She is oriented to person, place, and time. She appears well-developed and well-nourished. No distress.   Somnolent   HENT:   Head: Normocephalic and atraumatic.   Eyes: No scleral icterus.   Neck: Neck supple.   Cardiovascular: Normal rate and regular rhythm.   Pulmonary/Chest: Effort normal. No stridor. She has no wheezes. She has rales (R>L).   Abdominal: Soft. Bowel sounds are normal. She exhibits no distension. There is no tenderness. There is no guarding.   Musculoskeletal: She exhibits no edema, tenderness or deformity.   Neurological: She is alert and oriented to person, place, and time.   Skin: Skin is warm and dry. She is not diaphoretic. No erythema. No pallor.   Nursing note and vitals reviewed.      Vents: NOT ON VENT       Lines/Drains/Airways          None          Significant  Labs:    CBC/Anemia Profile:  Recent Labs   Lab 03/13/19  1312 03/14/19  0832 03/15/19  0621   WBC 14.37* 12.29 12.61   HGB 12.1 10.9* 10.5*   HCT 38.4 34.9* 34.4*    274 286   MCV 86 88 89   RDW 14.2 14.2 14.2        Chemistries:  Recent Labs   Lab 03/13/19  1312 03/14/19  0832 03/15/19  0621   * 133* 138   K 4.5 4.3 4.2   CL 99 100 107   CO2 23 22* 22*   BUN 19 19 22   CREATININE 1.5* 1.3 1.2   CALCIUM 9.9 9.0 9.2   ALBUMIN  --  2.8* 2.9*   PROT  --  6.2 6.3   BILITOT  --  0.3 0.2   ALKPHOS  --  74 65   ALT  --  13 18   AST  --  11 20   MG  --  1.7 1.8   PHOS  --  3.5 2.8       Significant Imaging:  CXR  Grossly stable abnormal radiographic appearance of the chest as above when compared to 03/11/2019 exam.          Assessment/Plan:     Cryptogenic organizing pneumonia    Patient with likely resurgence of her  in light of steroid tapering - increased fatigue/dyspnea upon decreasing from 15mg to 12.5mg. CXR with multifocal infiltrates. Afebrile. Resp culture with mod WBCs/rare GPC/GNR. Blood Cx negative    Recs:   - ontinue Prednisone at 60mg for at least the next 4 weeks at which time patient should follow up with Pulmonary (Dr. Francis) and Rheumatology (Dr. Rouse) to evaluate progress  - Will require a very slow taper of her prednisone over next few months.   - Continue PPI  -Needs pulmonary clinic follow up with 2-view CXR in 1 month.  -Needs 6MWT to quantify O2 requirements prior to discharge.  -Con't antibiotics to cover for possible infection- recommend levofloxacin 750mg to complete a 7-day course.            Rod Riggins MD  Pulmonology  [facility]

## 2019-03-15 NOTE — SUBJECTIVE & OBJECTIVE
Interval History:   Pt today with complaint of worsened lethargy, oxy sats mid to upper 90s on 2lpm    Objective:     Vital Signs (Most Recent):  Temp: 97.7 °F (36.5 °C) (03/15/19 0746)  Pulse: 85 (03/15/19 0746)  Resp: 20 (03/15/19 0746)  BP: (!) 146/64 (03/15/19 0746)  SpO2: 99 % (03/15/19 0746) Vital Signs (24h Range):  Temp:  [97.2 °F (36.2 °C)-99 °F (37.2 °C)] 97.7 °F (36.5 °C)  Pulse:  [80-94] 85  Resp:  [16-20] 20  SpO2:  [86 %-99 %] 99 %  BP: (122-164)/(58-72) 146/64     Weight: 73.9 kg (162 lb 14.7 oz)  Body mass index is 27.97 kg/m².      Intake/Output Summary (Last 24 hours) at 3/15/2019 0959  Last data filed at 3/15/2019 0900  Gross per 24 hour   Intake 240 ml   Output 901 ml   Net -661 ml       Physical Exam   Constitutional: She is oriented to person, place, and time. She appears well-developed and well-nourished. No distress.   Somnolent   HENT:   Head: Normocephalic and atraumatic.   Eyes: No scleral icterus.   Neck: Neck supple.   Cardiovascular: Normal rate and regular rhythm.   Pulmonary/Chest: Effort normal. No stridor. She has no wheezes. She has rales (R>L).   Abdominal: Soft. Bowel sounds are normal. She exhibits no distension. There is no tenderness. There is no guarding.   Musculoskeletal: She exhibits no edema, tenderness or deformity.   Neurological: She is alert and oriented to person, place, and time.   Skin: Skin is warm and dry. She is not diaphoretic. No erythema. No pallor.   Nursing note and vitals reviewed.      Vents: NOT ON VENT       Lines/Drains/Airways          None          Significant Labs:    CBC/Anemia Profile:  Recent Labs   Lab 03/13/19  1312 03/14/19  0832 03/15/19  0621   WBC 14.37* 12.29 12.61   HGB 12.1 10.9* 10.5*   HCT 38.4 34.9* 34.4*    274 286   MCV 86 88 89   RDW 14.2 14.2 14.2        Chemistries:  Recent Labs   Lab 03/13/19  1312 03/14/19  0832 03/15/19  0621   * 133* 138   K 4.5 4.3 4.2   CL 99 100 107   CO2 23 22* 22*   BUN 19 19 22   CREATININE  1.5* 1.3 1.2   CALCIUM 9.9 9.0 9.2   ALBUMIN  --  2.8* 2.9*   PROT  --  6.2 6.3   BILITOT  --  0.3 0.2   ALKPHOS  --  74 65   ALT  --  13 18   AST  --  11 20   MG  --  1.7 1.8   PHOS  --  3.5 2.8       Significant Imaging:  CXR  Grossly stable abnormal radiographic appearance of the chest as above when compared to 03/11/2019 exam.

## 2019-03-15 NOTE — PHARMACY MED REC
"Admission Medication Reconciliation - Pharmacy Consult Note    The home medication history was taken by Daphnie Haile, Pharmacy Technician.    Based on information gathered and subsequent review by the clinical pharmacist, the items below may need attention.     You may go to "Admission" then "Reconcile Home Medications" tabs to review and/or act upon these items.     Potentially problematic discrepancies with current MAR  o Patient IS taking the following which was not ordered upon admit  o Diazepam 2 mg PO nightly PRN  o Furosemide 20 mg PO daily    Please address this information as you see fit.  Feel free to contact us if you have any questions or require assistance.    Myke Matthews, Pharm.D., BCPS  [id number]                    .    .            "

## 2019-03-15 NOTE — ASSESSMENT & PLAN NOTE
See . ILD is diagnosis- patient will need O2 for home use based on history of  with acute hypoxemia this admission.

## 2019-03-15 NOTE — PROGRESS NOTES
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 She is on plaquenil, sulfasalazine, and prednisone for her RA. She has multiple prior ICU admissions attributed to . She has been on a prednisone taper since being discharged from the hospital in Jan 2019, tapered down to 10mg prednisone daily last week, which is when her SOB and lethargy worsened. Daughter states that her symptoms normally worsen when she tapers down to 10mg of prednisone. She was evaluated in pulm clinic on 2/4 while taking prednisone 25mg daily at that time with improving respiratory status.   She went to urgent care two days ago, CXR showed new airspace opacities and was started on levaquin. Daughter states they increased the prednisone back to 15mg daily. Pt was evaluated by Dr. Meraz in pulm clinic today and sent to ED for inpatient steroids, antibiotics, and supplemental O2. SpO2 85-88% at rest. Pt denies fever, chills, or other associated symptoms. Not on home O2.    Hospital Course:  Admitted to IM2. Infiltrates on CXR due to  2/2 rheumatoid arthritis with  concern for superimposed CAP. Given 1 dose of solumedrol 125mg on admit, started prednisone 60mg per pulmonology recs. Initially started on vanc and zosyn for broad coverage, narrowed coverage the following day; pt instructed to finish out her 5 days of levaquin PO at home to  complete treatment for CAP. F/u RVP. Instructed to continue plaquenil but hold sulfasalazine until completes levaquin course. Pt to follow up in pulm clinic and rheum clinic in 1 month. CXR ordered in 1 month. On 6 minute walk test, pt was satting 86% on RA. 94% on 2L. Qualifies for home O2.     Interval History: Patient stable overnight, reports improvement in pulm symptoms since starting high dose steroids.     Review of Systems  Objective:     Vital Signs (Most Recent):  Temp: 97.7 °F (36.5 °C) (03/15/19 0746)  Pulse: 85 (03/15/19 0746)  Resp: 20 (03/15/19 0746)  BP: (!) 146/64 (03/15/19 0746)  SpO2: 99 % (03/15/19 0746) Vital Signs (24h Range):  Temp:  [97.2 °F (36.2 °C)-99 °F (37.2 °C)] 97.7 °F (36.5 °C)  Pulse:  [80-97] 85  Resp:  [16-20] 20  SpO2:  [86 %-99 %] 99 %  BP: (122-164)/(58-72) 146/64     Weight: 73.9 kg (162 lb 14.7 oz)  Body mass index is 27.97 kg/m².    Intake/Output Summary (Last 24 hours) at 3/15/2019 0850  Last data filed at 3/15/2019 0500  Gross per 24 hour   Intake 240 ml   Output 600 ml   Net -360 ml      Physical Exam   Constitutional: She is oriented to person, place, and time. She appears well-developed and well-nourished. No distress.   Frail, well-appearing, somnolent   HENT:   Head: Normocephalic and atraumatic.   Eyes: No scleral icterus.   Neck: Neck supple.   Cardiovascular: Normal rate and regular rhythm.   Pulmonary/Chest: Effort normal. No stridor. She has no wheezes. She has rales (LLL, LML).   Diminished breath sounds in RLL   Abdominal: Soft. Bowel sounds are normal. She exhibits no distension. There is no tenderness. There is no guarding.   Musculoskeletal: She exhibits no edema, tenderness or deformity.   Neurological: She is alert and oriented to person, place, and time.   Skin: Skin is warm and dry. She is not diaphoretic. No erythema. No pallor.   Nursing note and vitals reviewed.      Significant Labs:   Blood Culture:   Recent Labs   Lab 03/13/19  1245 03/13/19  1310    LABBLOO No Growth to date  No Growth to date No Growth to date  No Growth to date     CBC:   Recent Labs   Lab 03/13/19  1312 03/14/19  0832 03/15/19  0621   WBC 14.37* 12.29 12.61   HGB 12.1 10.9* 10.5*   HCT 38.4 34.9* 34.4*    274 286     CMP:   Recent Labs   Lab 03/13/19  1312 03/14/19  0832 03/15/19  0621   * 133* 138   K 4.5 4.3 4.2   CL 99 100 107   CO2 23 22* 22*   * 183* 78   BUN 19 19 22   CREATININE 1.5* 1.3 1.2   CALCIUM 9.9 9.0 9.2   PROT  --  6.2 6.3   ALBUMIN  --  2.8* 2.9*   BILITOT  --  0.3 0.2   ALKPHOS  --  74 65   AST  --  11 20   ALT  --  13 18   ANIONGAP 12 11 9   EGFRNONAA 32.4* 38.6* 42.5*     Lipase: No results for input(s): LIPASE in the last 48 hours.  Magnesium:   Recent Labs   Lab 03/14/19  0832 03/15/19  0621   MG 1.7 1.8     All pertinent labs within the past 24 hours have been reviewed.    Significant Imaging: I have reviewed and interpreted all pertinent imaging results/findings within the past 24 hours.    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 ILD is diagnosis- patient will need O2 for home use based on history of  with acute hypoxemia this admission.        CAP (community acquired pneumonia)    - possible CAP superimposed on   - Initially started on vanc and zosyn for broad coverage, narrowed coverage the following day; pt instructed to finish out her 5 days of levaquin PO at home to complete treatment for CAP.   - F/u RVP     KERMIT (acute kidney injury)    - Cr 1.5-> 1.3  - Baseline Cr 1.1  - 1L bolus NS  - Renally dose antibiotics. Avoid nephrotoxic medications.  - Continue to monitor with hydration     Cryptogenic organizing pneumonia    - Hx of recurring infiltrates on CXR due to  2/2 rheumatoid arthritis with multiple prior admissions.  - Infiltrates currently seen in RLL, LML, LLL.  - Symptoms typically worsen when prednisone gets down to 10mg daily  - Consulted pulmonology. Appreciate  recs.  - Given 1 dose of solumedrol 125mg on admit, started prednisone 60mg per pulmonology recs.  - Incentive spirometry and flutter valve pulm toilet treatment prn  - Duonebs q4 wake  - continue home singulair   - Vancomycin and zosyn for empiric pneumonia coverage- narrowed to complete total of 7 day course of levaquin that was started outpatient.  - Follow up resp viral panel, sputum culture  - O2 NC prn  - Continue plaquenil 200mg BID. Hold sulfasalazine until complete course of levaquin.  - Pt to follow up in pulm clinic and rheum clinic in 1 month. CXR ordered in 1 month.   - On 6 minute walk test, pt was satting 86% on RA. 94% on 2L- weaning O2 for discharge with 6mwt repeated again today     Dystonia    - follows with Dr. Giang in neurology  - continue home gabapentin 300mg qhs     Long-term use of immunosuppressant medication    - On home plaquenil, sulfasalazine, and prednisone daily for RA  - see seropositive RA  - hold sulfasalazine until complete course of levaquin     History of stroke    - On ASA, plavix daily  - Wheelchair bound     Essential hypertension    - continue home coreg  - hold lasix due to KERMIT     Seropositive rheumatoid arthritis of multiple sites    - On home plaquenil, sulfasalazine, and prednisone daily for RA  -  2/2 RA  - continue plaquenil 200mg BID. Hold sulfasalazine until complete course of levaquin.  - follows with Dr. Rouse rheumatology. F/u in rheum clinic in 1 month.       VTE Risk Mitigation (From admission, onward)        Ordered     IP VTE HIGH RISK PATIENT  Once      03/14/19 0239     heparin (porcine) injection 5,000 Units  Every 8 hours      03/13/19 1554              Vlad Tovar MD  Department of Hospital Medicine   [facility]

## 2019-03-15 NOTE — NURSING
Pt took medications, except predisone  Pt waiting to talk to Willard GARSIA  RN paiged/attempted to contact MD several times

## 2019-03-15 NOTE — PT/OT/SLP EVAL
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 Pt's activity tolerance limited this visit-- pt reported generally "not feeling well" and fatigued.  She presents with the following impairments/functional limitations:  impaired endurance, weakness, gait instability, impaired functional mobilty, impaired balance, pain, impaired cardiopulmonary response to activity. Pt would benefit from continued PT intervention to address listed functional deficits, reduce fall risk and maximize return to PLOF.     Rehab Prognosis: Good; patient would benefit from acute skilled PT services to address these deficits and reach maximum level of function.      Recent Surgery: * No surgery found *      Plan:     During this hospitalization, patient to be seen 3 x/week to address the identified rehab impairments via gait training, therapeutic activities, therapeutic exercises, neuromuscular re-education and progress toward the following goals:    · Plan of Care Expires:  04/14/19    Subjective     Chief Complaint: "not feeling well"   Patient/Family Comments/goals: return home  Pain/Comfort:  · Pain Rating 1: (Pt did not rate pain on numeric scale)  · Location 1: (back)  · Pain Addressed 1: Reposition, Distraction, Nurse notified  · Pain Rating Post-Intervention 1: 0/10    Patients cultural, spiritual, Mandaeism conflicts given the current situation: no    Patient History:     Living Environment: Pt states that her daughters are currently living in her home. Pt's home is a [facility] with [address].   Prior Level of Function: modified independent with mobility, using " "RW as needed within home. Pt states that she has assistance for bathing.   DME owned: bedside commode, RW, shower chair   Caregiver Assistance: pt states that she will have assistance from daughters as needed    Objective:     Communicated with RN prior to session.  Patient found supine with telemetry, oxygen, peripheral IV  upon PT entry to room.    General Precautions: Standard, fall   Orthopedic Precautions:N/A   Braces: N/A     Exams:  · Postural Exam:  Patient presented with the following abnormalities:    · -       Rounded shoulders  · -       Forward head  · Sensation:    · -       Intact  · Skin Integrity/Edema:      · -       Skin integrity: Visible skin intact  · RLE ROM: WFL  · RLE Strength: WFL  · LLE ROM: WFL  · LLE Strength: WFL     Functional Mobility:    Bed Mobility:   · Supine > Sit: contact guard assistance for trunk elevation   · Sit > Supine: N/T     Transfers:   · Sit <> Stand Transfer: stand by assistance from EOB with RW   · Bed <> Chair: stand by assistance with RW                  Gait:  · Distance: ~5 steps and turn from EOB>chair   · Assistance level: stand by assistance  · Assistive Device: RW   · Gait Assessment: forward flexed posture, decreased step length   *distance limited 2/2 pt reported feeling fatigued, "not well" and hungry for breakfast.      Therapeutic Activities and Exercises:  Pt educated on:  - Role of PT and POC/goals for therapy   - Safety with mobility  - Instructed to call nursing staff for assistance with mobility as needed 2/2 fall risk   - recommendations for increasing OOB activity with assistance to improve endurance/increase mobilization     Pt verbalized understanding and expressed no further concerns/questions.     AM-PAC 6 CLICK MOBILITY  Total Score:18     Patient left up in chair with all lines intact, call button in reach and RN notified.    GOALS:   Multidisciplinary Problems     Physical Therapy Goals        Problem: Physical Therapy Goal    Goal " Priority Disciplines Outcome Goal Variances Interventions   Physical Therapy Goal     PT, PT/OT Ongoing (interventions implemented as appropriate)     Description:  Goals to be met by: 3/22/19    Patient will increase functional independence with mobility by performin. Supine to sit with Modified [place]  2. Sit to supine with Modified [place]  3. Sit to stand transfer with Modified [place]  4. Gait  x 100 feet with Stand-by Assistance using Rolling Walker.   5. Ascend/descend 5 stairs with right Handrails  With contact Guard Assistance.   6. Lower extremity exercise program x30 reps per handout, with independence                      History:     Past Medical History:   Diagnosis Date    Altered mental status     Anemia     Arthritis     Bilateral hand pain 3/14/2018    Branch retinal vein occlusion, left eye 2015    Chronic bilateral low back pain without sciatica 3/23/2017    Cognitive communication deficit 2017    Cystoid macular edema, left eye 2015    Cystoid macular edema, left eye 2015    Delirium superimposed on dementia     DJD (degenerative joint disease) of cervical spine 5/15/2013    Fatty liver 2014    Goiter 2018    Hashimoto's disease     Hemichorea 2017    Hypertension     Hypertensive encephalopathy     IBS (irritable bowel syndrome) 2017    IGT (impaired glucose tolerance) 2016    Iron deficiency anemia secondary to inadequate dietary iron intake 2013    Joint pain     Keratoconjunctivitis sicca of both eyes not specified as Sjogren's 2016    Leiomyoma of uterus, unspecified 2014    Long QT interval 2016    Due to medication (plaquenil)     Macular edema 2015    Multinodular goiter 2016    Neuropathy 2017    Plaquenil causing adverse effect in therapeutic use 10/7/2016    Pneumococcal vaccine refused 2016    Pneumonia due to Streptococcus pneumoniae 2018     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/2015    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    s/p Right Total knee replacement 5/15/2013    Scoliosis of thoracic spine 5/15/2013    Small vessel disease, cerebrovascular 12/28/2017    Stroke     UTI (urinary tract infection) 12/29/2018    Vascular dementia 12/6/2017       Past Surgical History:   Procedure Laterality Date    BREAST CYST EXCISION      CATARACT EXTRACTION      COLONOSCOPY N/A 9/29/2015    Performed by FIDELINA Sanchez [facility] at [facility] ENDO (4TH FLR)    EGD (ESOPHAGOGASTRODUODENOSCOPY) N/A 3/11/2014    Performed by Federico Escobar MD at [facility] ENDO (4TH FLR)    EGD (ESOPHAGOGASTRODUODENOSCOPY) N/A 11/5/2013    Performed by Federico Escobar MD at [facility] ENDO (4TH FLR)    ESOPHAGOGASTRODUODENOSCOPY (EGD) N/A 10/4/2017    Performed by Mg Morton MD at [facility] ENDO    EYE SURGERY      INJECTION-STEROID-EPIDURAL-TRANSFORAMINAL Right 9/20/2017    Performed [facility]elin Valdez [facility] at [facility] PAIN MGT    JOINT REPLACEMENT      right knee    KNEE SURGERY Left 12/31/2013    TKR    left parotidectomy      mixed tumor    SALIVARY GLAND SURGERY      TONSILLECTOMY         Time Tracking:     PT Received On: 03/15/19  PT Start Time: 0923     PT Stop Time: 0952  PT Total Time (min): 29 min     Billable Minutes: Evaluation 20 min and Therapeutic Activity 9 min    Laura Elkins, PT, DPT   3/15/2019  Pager: [phone]

## 2019-03-15 NOTE — PROCEDURES
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  The diagnosis is Shortness of Breath, Qualify for Oxygen; Cryptogenic Organizing Pneumonia.  The patient's BMI is 28 kg/m2. Predicted distance (lower limit of normal) is 231.05 meters.    Test Results:    The test was completed with stops.  The patient stopped 1 time for a total of 19 seconds.  The total time walked was 341 seconds.  During walking, the patient reported:  No complaints.  The patient used a walker for assistance and supplemental oxygen during testing.     Oxygen Qualification:    03/15/2019---------Distance: 49.68 meters (163 feet)     O2 Sat % Supplemental Oxygen Heart Rate Blood Pressure Jasmin Scale   Pre-exercise  (Resting) 83 % Room Air 89 bpm 168/73 mmHg 4   Pre-exercise  (Resting) 93 % 4 L/M 98 bpm 152/70 mmHg 3   During Exercise 88 % 4 L/M 105 bpm 148/72 mmHg 2   Post-exercise   95 % 4 L/M  101 bpm       Recovery Time:  283 seconds    Performing nurse/tech:  Gabriele SANCHEZ      PREVIOUS STUDY:   The patient has not had a previous study.      CLINICAL INTERPRETATION:  Six minute walk distance is 49.68 meters (163 feet) with somewhat heavy dyspnea.  At rest, there was significant desaturation while breathing room air.  During exercise, there was significant desaturation while breathing supplemental oxygen.  Both blood pressure and heart rate remained stable with walking.  Hypertension was present prior to exercise.  The patient did not report non-pulmonary symptoms during exercise.  Severe exercise impairment is likely due to desaturation and subjective symptoms.  The patient did complete the study, walking 341 seconds of the 360 second test.  The patient may benefit from using supplemental oxygen.  No previous study performed.  Based upon age and body mass index, exercise capacity is less than predicted.   Oxygen saturation did improve while breathing  supplemental oxygen.

## 2019-03-15 NOTE — PLAN OF CARE
Patient walked with respiratory therapy - O2Sat on RA decreased to 83% and improved to 94% on 4L NC.    Marilin Holcomb MD  Pulmonary Staff

## 2019-03-15 NOTE — ASSESSMENT & PLAN NOTE
Patient with likely resurgence of her  in light of steroid tapering - increased fatigue/dyspnea upon decreasing from 15mg to 12.5mg. CXR with multifocal infiltrates. Afebrile. Resp culture with mod WBCs/rare GPC/GNR. Blood Cx negative    Recs:   - ontinue Prednisone at 60mg for at least the next 4 weeks at which time patient should follow up with Pulmonary (Dr. Francis) and Rheumatology (Dr. Rouse) to evaluate progress  - Will require a very slow taper of her prednisone over next few months.   - Continue PPI  -Needs pulmonary clinic follow up with 2-view CXR in 1 month.  -Needs 6MWT to quantify O2 requirements prior to discharge.  -Con't antibiotics to cover for possible infection- recommend levofloxacin 750mg to complete a 7-day course.

## 2019-03-15 NOTE — ASSESSMENT & PLAN NOTE
- On home plaquenil, sulfasalazine, and prednisone daily for RA  -  2/2 RA  - continue plaquenil 200mg BID. Hold sulfasalazine until complete course of levaquin.  - follows with Dr. Rouse rheumatology. F/u in rheum clinic in 1 month.

## 2019-03-16 LAB
ALBUMIN SERPL BCP-MCNC: 2.7 G/DL
ALP SERPL-CCNC: 60 U/L
ALT SERPL W/O P-5'-P-CCNC: 16 U/L
ANION GAP SERPL CALC-SCNC: 7 MMOL/L
ANISOCYTOSIS BLD QL SMEAR: SLIGHT
AST SERPL-CCNC: 14 U/L
BACTERIA SPEC AEROBE CULT: NORMAL
BACTERIA SPEC AEROBE CULT: NORMAL
BASOPHILS # BLD AUTO: 0.01 K/UL
BASOPHILS NFR BLD: 0.1 %
BILIRUB SERPL-MCNC: 0.3 MG/DL
BUN SERPL-MCNC: 18 MG/DL
BURR CELLS BLD QL SMEAR: ABNORMAL
CALCIUM SERPL-MCNC: 8.9 MG/DL
CHLORIDE SERPL-SCNC: 107 MMOL/L
CO2 SERPL-SCNC: 24 MMOL/L
CREAT SERPL-MCNC: 1 MG/DL
DIFFERENTIAL METHOD: ABNORMAL
EOSINOPHIL # BLD AUTO: 0.1 K/UL
EOSINOPHIL NFR BLD: 0.5 %
ERYTHROCYTE [DISTWIDTH] IN BLOOD BY AUTOMATED COUNT: 14.3 %
EST. GFR  (AFRICAN AMERICAN): >60 ML/MIN/1.73 M^2
EST. GFR  (NON AFRICAN AMERICAN): 53 ML/MIN/1.73 M^2
GLUCOSE SERPL-MCNC: 73 MG/DL
GRAM STN SPEC: NORMAL
HCT VFR BLD AUTO: 32 %
HGB BLD-MCNC: 9.9 G/DL
IMM GRANULOCYTES # BLD AUTO: 0.07 K/UL
IMM GRANULOCYTES NFR BLD AUTO: 0.6 %
LYMPHOCYTES # BLD AUTO: 1.5 K/UL
LYMPHOCYTES NFR BLD: 13.1 %
MAGNESIUM SERPL-MCNC: 1.8 MG/DL
MCH RBC QN AUTO: 27 PG
MCHC RBC AUTO-ENTMCNC: 30.9 G/DL
MCV RBC AUTO: 87 FL
MONOCYTES # BLD AUTO: 2.1 K/UL
MONOCYTES NFR BLD: 18.2 %
NEUTROPHILS # BLD AUTO: 7.9 K/UL
NEUTROPHILS NFR BLD: 67.5 %
NRBC BLD-RTO: 0 /100 WBC
OVALOCYTES BLD QL SMEAR: ABNORMAL
PHOSPHATE SERPL-MCNC: 3.1 MG/DL
PLATELET # BLD AUTO: 270 K/UL
PLATELET BLD QL SMEAR: ABNORMAL
PMV BLD AUTO: 9.3 FL
POCT GLUCOSE: 105 MG/DL (ref 70–110)
POCT GLUCOSE: 129 MG/DL (ref 70–110)
POCT GLUCOSE: 75 MG/DL (ref 70–110)
POIKILOCYTOSIS BLD QL SMEAR: SLIGHT
POTASSIUM SERPL-SCNC: 4.5 MMOL/L
PROT SERPL-MCNC: 5.7 G/DL
RBC # BLD AUTO: 3.67 M/UL
SODIUM SERPL-SCNC: 138 MMOL/L
WBC # BLD AUTO: 11.68 K/UL

## 2019-03-16 PROCEDURE — 99232 PR SUBSEQUENT HOSPITAL CARE,LEVL II: ICD-10-PCS | Mod: GC,,, | Performed by: INTERNAL MEDICINE

## 2019-03-16 PROCEDURE — 20600001 HC STEP DOWN PRIVATE ROOM

## 2019-03-16 PROCEDURE — 99900035 HC TECH TIME PER 15 MIN (STAT)

## 2019-03-16 PROCEDURE — 27000221 HC OXYGEN, UP TO 24 HOURS

## 2019-03-16 PROCEDURE — 25000242 PHARM REV CODE 250 ALT 637 W/ HCPCS: Performed by: INTERNAL MEDICINE

## 2019-03-16 PROCEDURE — 84100 ASSAY OF PHOSPHORUS: CPT

## 2019-03-16 PROCEDURE — 25000003 PHARM REV CODE 250: Performed by: INTERNAL MEDICINE

## 2019-03-16 PROCEDURE — 99232 SBSQ HOSP IP/OBS MODERATE 35: CPT | Mod: GC,,, | Performed by: INTERNAL MEDICINE

## 2019-03-16 PROCEDURE — 94640 AIRWAY INHALATION TREATMENT: CPT

## 2019-03-16 PROCEDURE — 63600175 PHARM REV CODE 636 W HCPCS: Performed by: INTERNAL MEDICINE

## 2019-03-16 PROCEDURE — 80053 COMPREHEN METABOLIC PANEL: CPT

## 2019-03-16 PROCEDURE — 94664 DEMO&/EVAL PT USE INHALER: CPT

## 2019-03-16 PROCEDURE — 85025 COMPLETE CBC W/AUTO DIFF WBC: CPT

## 2019-03-16 PROCEDURE — 83735 ASSAY OF MAGNESIUM: CPT

## 2019-03-16 PROCEDURE — 94761 N-INVAS EAR/PLS OXIMETRY MLT: CPT

## 2019-03-16 PROCEDURE — 36415 COLL VENOUS BLD VENIPUNCTURE: CPT

## 2019-03-16 RX ADMIN — IPRATROPIUM BROMIDE AND ALBUTEROL SULFATE 3 ML: .5; 3 SOLUTION RESPIRATORY (INHALATION) at 08:03

## 2019-03-16 RX ADMIN — IPRATROPIUM BROMIDE AND ALBUTEROL SULFATE 3 ML: .5; 3 SOLUTION RESPIRATORY (INHALATION) at 03:03

## 2019-03-16 RX ADMIN — HYDROXYCHLOROQUINE SULFATE 200 MG: 200 TABLET, FILM COATED ORAL at 10:03

## 2019-03-16 RX ADMIN — HEPARIN SODIUM 5000 UNITS: 5000 INJECTION, SOLUTION INTRAVENOUS; SUBCUTANEOUS at 05:03

## 2019-03-16 RX ADMIN — ASPIRIN 81 MG: 81 TABLET, COATED ORAL at 10:03

## 2019-03-16 RX ADMIN — CARVEDILOL 12.5 MG: 12.5 TABLET, FILM COATED ORAL at 04:03

## 2019-03-16 RX ADMIN — HEPARIN SODIUM 5000 UNITS: 5000 INJECTION, SOLUTION INTRAVENOUS; SUBCUTANEOUS at 10:03

## 2019-03-16 RX ADMIN — LEVOFLOXACIN 750 MG: 750 TABLET, FILM COATED ORAL at 10:03

## 2019-03-16 RX ADMIN — IPRATROPIUM BROMIDE AND ALBUTEROL SULFATE 3 ML: .5; 3 SOLUTION RESPIRATORY (INHALATION) at 12:03

## 2019-03-16 RX ADMIN — CARVEDILOL 12.5 MG: 12.5 TABLET, FILM COATED ORAL at 10:03

## 2019-03-16 RX ADMIN — GABAPENTIN 300 MG: 300 CAPSULE ORAL at 10:03

## 2019-03-16 RX ADMIN — PREDNISONE 60 MG: 20 TABLET ORAL at 10:03

## 2019-03-16 RX ADMIN — CLOPIDOGREL 75 MG: 75 TABLET, FILM COATED ORAL at 10:03

## 2019-03-16 RX ADMIN — HEPARIN SODIUM 5000 UNITS: 5000 INJECTION, SOLUTION INTRAVENOUS; SUBCUTANEOUS at 03:03

## 2019-03-16 RX ADMIN — MONTELUKAST SODIUM 10 MG: 10 TABLET, FILM COATED ORAL at 10:03

## 2019-03-16 NOTE — ASSESSMENT & PLAN NOTE
- Hx of ILD/ crytogenic organizing pneumonia, pulmonary HTN group 3.  - Hx of recurring infiltrates on CXR due to  2/2 rheumatoid arthritis with multiple prior admissions.  - Infiltrates currently seen in RLL, LML, LLL.  - Symptoms typically worsen when prednisone gets down to 10mg daily  - Consulted pulmonology. Appreciate recs.  - Given 1 dose of solumedrol 125mg on admit, started prednisone 60mg per pulmonology recs.  - Incentive spirometry and flutter valve pulm toilet treatment prn  - Duonebs q4 wake  - continue home singulair   - Vancomycin and zosyn for empiric pneumonia coverage- narrowed to complete total of 7 day course of levaquin that was started outpatient.  - Follow up resp viral panel, sputum culture  - O2 NC prn  - Continue plaquenil 200mg BID. Hold sulfasalazine until complete course of levaquin.  - Pt to follow up in pulm clinic and rheum clinic in 1 month. CXR ordered in 1 month.   - On official 6 minute walk test, pt was satting 83% on RA, 93% on 4L at rest, 88% on 4L with exercise, 95% on 4L post-exercise. Qualifies for home O2.

## 2019-03-16 NOTE — ASSESSMENT & PLAN NOTE
- Cr 1.5-> 1.3-> 1.0  - Baseline Cr 1.0  - 1L bolus NS  - Renally dose antibiotics. Avoid nephrotoxic medications.  - Continue to monitor with hydration  - since resolved

## 2019-03-16 NOTE — PLAN OF CARE
Problem: Adult Inpatient Plan of Care  Goal: Plan of Care Review  Outcome: Ongoing (interventions implemented as appropriate)  Plan of care reviewed with pt, verbalized understanding  Answered questions  Free from falls and injury  Afebrile  SR on tele   ANTB PO daily  No insulin coverage needed  PT PULM seen  Will continue to monitor

## 2019-03-16 NOTE — PROGRESS NOTES
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 She is on plaquenil, sulfasalazine, and prednisone for her RA. She has multiple prior ICU admissions attributed to . She has been on a prednisone taper since being discharged from the hospital in Jan 2019, tapered down to 10mg prednisone daily last week, which is when her SOB and lethargy worsened. Daughter states that her symptoms normally worsen when she tapers down to 10mg of prednisone. She was evaluated in pulm clinic on 2/4 while taking prednisone 25mg daily at that time with improving respiratory status.   She went to urgent care two days ago, CXR showed new airspace opacities and was started on levaquin. Daughter states they increased the prednisone back to 15mg daily. Pt was evaluated by Dr. Meraz in pulm clinic today and sent to ED for inpatient steroids, antibiotics, and supplemental O2. SpO2 85-88% at rest. Pt denies fever, chills, or other associated symptoms. Not on home O2.    Hospital Course:  Admitted to IM2. Infiltrates on CXR due to  2/2 rheumatoid arthritis with  concern for superimposed CAP. Given 1 dose of solumedrol 125mg on admit, started prednisone 60mg per pulmonology recs. Initially started on vanc and zosyn for broad coverage, narrowed coverage the following day; pt instructed to  finish out her 5 days of levaquin PO at home to complete treatment for CAP. F/u RVP. Instructed to continue plaquenil but hold sulfasalazine until completes levaquin course. Pt to follow up in pulm clinic and rheum clinic in 1 month. CXR ordered in 1 month. On official 6 minute walk test, pt was satting 83% on RA, 93% on 4L at rest, 88% on 4L with exercise, 95% on 4L post-exercise. Qualifies for home O2. Pt has hx of ILD/ crytogenic organizing pneumonia, pulmonary HTN group 3.    Interval History: Patient stable overnight, reports improving SOB since starting high dose steroids. Denies cough and lethargy. Tolerating diet. On 2L O2.     Review of Systems   Constitutional: Negative for activity change, chills, fatigue and fever.   HENT: Negative for sore throat.    Eyes: Negative for visual disturbance.   Respiratory: Positive for shortness of breath. Negative for cough.    Cardiovascular: Negative for chest pain.   Gastrointestinal: Negative for abdominal pain, diarrhea, nausea and vomiting.   Genitourinary: Negative for dysuria.   Musculoskeletal: Negative for myalgias.   Skin: Negative for rash and wound.   Neurological: Positive for weakness. Negative for dizziness, light-headedness and headaches.     Objective:     Vital Signs (Most Recent):  Temp: 98.1 °F (36.7 °C) (03/16/19 1134)  Pulse: 86 (03/16/19 1230)  Resp: 16 (03/16/19 1230)  BP: 120/64 (03/16/19 1134)  SpO2: 96 % (03/16/19 1230) Vital Signs (24h Range):  Temp:  [96.9 °F (36.1 °C)-98.1 °F (36.7 °C)] 98.1 °F (36.7 °C)  Pulse:  [61-96] 86  Resp:  [16-20] 16  SpO2:  [94 %-96 %] 96 %  BP: (120-147)/(64-70) 120/64     Weight: 73.9 kg (162 lb 14.7 oz)  Body mass index is 27.97 kg/m².    Intake/Output Summary (Last 24 hours) at 3/16/2019 1401  Last data filed at 3/16/2019 0500  Gross per 24 hour   Intake 240 ml   Output --   Net 240 ml      Physical Exam   Constitutional: She is oriented to person, place, and time. She appears well-developed and well-nourished.  No distress.   Frail, well-appearing, somnolent   HENT:   Head: Normocephalic and atraumatic.   Eyes: No scleral icterus.   Neck: Neck supple.   Cardiovascular: Normal rate and regular rhythm.   Pulmonary/Chest: Effort normal. No stridor. She has no wheezes. She has no rales.   Diminished breath sounds in RLL   Abdominal: Soft. Bowel sounds are normal. She exhibits no distension. There is no tenderness. There is no guarding.   Musculoskeletal: She exhibits no edema, tenderness or deformity.   Neurological: She is alert and oriented to person, place, and time.   Skin: Skin is warm and dry. She is not diaphoretic. No erythema. No pallor.   Nursing note and vitals reviewed.      Significant Labs:   Blood Culture:   No results for input(s): LABBLOO in the last 48 hours.  CBC:   Recent Labs   Lab 03/15/19  0621 03/16/19  0350   WBC 12.61 11.68   HGB 10.5* 9.9*   HCT 34.4* 32.0*    270     CMP:   Recent Labs   Lab 03/15/19  0621 03/16/19  0350    138   K 4.2 4.5    107   CO2 22* 24   GLU 78 73   BUN 22 18   CREATININE 1.2 1.0   CALCIUM 9.2 8.9   PROT 6.3 5.7*   ALBUMIN 2.9* 2.7*   BILITOT 0.2 0.3   ALKPHOS 65 60   AST 20 14   ALT 18 16   ANIONGAP 9 7*   EGFRNONAA 42.5* 53.0*     Lipase: No results for input(s): LIPASE in the last 48 hours.  Magnesium:   Recent Labs   Lab 03/15/19  0621 03/16/19  0350   MG 1.8 1.8     All pertinent labs within the past 24 hours have been reviewed.    Significant Imaging: I have reviewed and interpreted all pertinent imaging results/findings within the past 24 hours.    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 ILD is diagnosis- patient will need O2 for home use based on history of  with acute hypoxemia this admission.        Cryptogenic organizing pneumonia    - Hx of ILD/ crytogenic organizing pneumonia, pulmonary HTN group 3.  - Hx of recurring infiltrates on CXR due to  2/2 rheumatoid arthritis with multiple  prior admissions.  - Infiltrates currently seen in RLL, LML, LLL.  - Symptoms typically worsen when prednisone gets down to 10mg daily  - Consulted pulmonology. Appreciate recs.  - Given 1 dose of solumedrol 125mg on admit, started prednisone 60mg per pulmonology recs.  - Incentive spirometry and flutter valve pulm toilet treatment prn  - Duonebs q4 wake  - continue home singulair   - Vancomycin and zosyn for empiric pneumonia coverage- narrowed to complete total of 7 day course of levaquin that was started outpatient.  - Follow up resp viral panel, sputum culture  - O2 NC prn  - Continue plaquenil 200mg BID. Hold sulfasalazine until complete course of levaquin.  - Pt to follow up in pulm clinic and rheum clinic in 1 month. CXR ordered in 1 month.   - On official 6 minute walk test, pt was satting 83% on RA, 93% on 4L at rest, 88% on 4L with exercise, 95% on 4L post-exercise. Qualifies for home O2.      CAP (community acquired pneumonia)    - possible CAP superimposed on   - Initially started on vanc and zosyn for broad coverage, narrowed coverage the following day; pt instructed to finish out her 5 days of levaquin PO to complete treatment for CAP.   - Resp culture with few candida  - RVP negative     KERMIT (acute kidney injury)    - Cr 1.5-> 1.3-> 1.0  - Baseline Cr 1.0  - 1L bolus NS  - Renally dose antibiotics. Avoid nephrotoxic medications.  - Continue to monitor with hydration  - since resolved     Seropositive rheumatoid arthritis of multiple sites    - On home plaquenil, sulfasalazine, and prednisone daily for RA  -  2/2 RA  - continue plaquenil 200mg BID. Hold sulfasalazine until complete course of levaquin.  - follows with Dr. Rouse rheumatology. F/u in rheum clinic in 1 month.     Dystonia    - follows with Dr. Giang in neurology  - continue home gabapentin 300mg qhs     Long-term use of immunosuppressant medication    - On home plaquenil, sulfasalazine, and prednisone daily for RA  - see  seropositive RA  - hold sulfasalazine until complete course of levaquin     History of stroke    - On ASA, plavix daily  - Wheelchair bound     Essential hypertension    - continue home coreg  - hold lasix due to KERMIT       VTE Risk Mitigation (From admission, onward)        Ordered     IP VTE HIGH RISK PATIENT  Once      03/14/19 0239     heparin (porcine) injection 5,000 Units  Every 8 hours      03/13/19 1557        Dom Street MD  Department of Hospital Medicine   [facility]

## 2019-03-16 NOTE — ASSESSMENT & PLAN NOTE
- possible CAP superimposed on   - Initially started on vanc and zosyn for broad coverage, narrowed coverage the following day; pt instructed to finish out her 5 days of levaquin PO to complete treatment for CAP.   - Resp culture with few candida  - RVP negative

## 2019-03-16 NOTE — SUBJECTIVE & OBJECTIVE
Interval History: Patient stable overnight, reports improving SOB since starting high dose steroids. Denies cough and lethargy. Tolerating diet. On 2L O2.     Review of Systems   Constitutional: Negative for activity change, chills, fatigue and fever.   HENT: Negative for sore throat.    Eyes: Negative for visual disturbance.   Respiratory: Positive for shortness of breath. Negative for cough.    Cardiovascular: Negative for chest pain.   Gastrointestinal: Negative for abdominal pain, diarrhea, nausea and vomiting.   Genitourinary: Negative for dysuria.   Musculoskeletal: Negative for myalgias.   Skin: Negative for rash and wound.   Neurological: Positive for weakness. Negative for dizziness, light-headedness and headaches.     Objective:     Vital Signs (Most Recent):  Temp: 98.1 °F (36.7 °C) (03/16/19 1134)  Pulse: 86 (03/16/19 1230)  Resp: 16 (03/16/19 1230)  BP: 120/64 (03/16/19 1134)  SpO2: 96 % (03/16/19 1230) Vital Signs (24h Range):  Temp:  [96.9 °F (36.1 °C)-98.1 °F (36.7 °C)] 98.1 °F (36.7 °C)  Pulse:  [61-96] 86  Resp:  [16-20] 16  SpO2:  [94 %-96 %] 96 %  BP: (120-147)/(64-70) 120/64     Weight: 73.9 kg (162 lb 14.7 oz)  Body mass index is 27.97 kg/m².    Intake/Output Summary (Last 24 hours) at 3/16/2019 1401  Last data filed at 3/16/2019 0500  Gross per 24 hour   Intake 240 ml   Output --   Net 240 ml      Physical Exam   Constitutional: She is oriented to person, place, and time. She appears well-developed and well-nourished. No distress.   Frail, well-appearing, somnolent   HENT:   Head: Normocephalic and atraumatic.   Eyes: No scleral icterus.   Neck: Neck supple.   Cardiovascular: Normal rate and regular rhythm.   Pulmonary/Chest: Effort normal. No stridor. She has no wheezes. She has no rales.   Diminished breath sounds in RLL   Abdominal: Soft. Bowel sounds are normal. She exhibits no distension. There is no tenderness. There is no guarding.   Musculoskeletal: She exhibits no edema, tenderness or  deformity.   Neurological: She is alert and oriented to person, place, and time.   Skin: Skin is warm and dry. She is not diaphoretic. No erythema. No pallor.   Nursing note and vitals reviewed.      Significant Labs:   Blood Culture:   No results for input(s): LABBLOO in the last 48 hours.  CBC:   Recent Labs   Lab 03/15/19  0621 03/16/19  0350   WBC 12.61 11.68   HGB 10.5* 9.9*   HCT 34.4* 32.0*    270     CMP:   Recent Labs   Lab 03/15/19  0621 03/16/19  0350    138   K 4.2 4.5    107   CO2 22* 24   GLU 78 73   BUN 22 18   CREATININE 1.2 1.0   CALCIUM 9.2 8.9   PROT 6.3 5.7*   ALBUMIN 2.9* 2.7*   BILITOT 0.2 0.3   ALKPHOS 65 60   AST 20 14   ALT 18 16   ANIONGAP 9 7*   EGFRNONAA 42.5* 53.0*     Lipase: No results for input(s): LIPASE in the last 48 hours.  Magnesium:   Recent Labs   Lab 03/15/19  0621 03/16/19  0350   MG 1.8 1.8     All pertinent labs within the past 24 hours have been reviewed.    Significant Imaging: I have reviewed and interpreted all pertinent imaging results/findings within the past 24 hours.

## 2019-03-17 LAB
ALBUMIN SERPL BCP-MCNC: 2.6 G/DL
ALP SERPL-CCNC: 57 U/L
ALT SERPL W/O P-5'-P-CCNC: 14 U/L
ANION GAP SERPL CALC-SCNC: 9 MMOL/L
AST SERPL-CCNC: 10 U/L
BASOPHILS # BLD AUTO: 0.02 K/UL
BASOPHILS NFR BLD: 0.2 %
BILIRUB SERPL-MCNC: 0.3 MG/DL
BUN SERPL-MCNC: 17 MG/DL
CALCIUM SERPL-MCNC: 9.1 MG/DL
CHLORIDE SERPL-SCNC: 108 MMOL/L
CO2 SERPL-SCNC: 23 MMOL/L
CREAT SERPL-MCNC: 1 MG/DL
DIFFERENTIAL METHOD: ABNORMAL
EOSINOPHIL # BLD AUTO: 0.1 K/UL
EOSINOPHIL NFR BLD: 1 %
ERYTHROCYTE [DISTWIDTH] IN BLOOD BY AUTOMATED COUNT: 14.3 %
EST. GFR  (AFRICAN AMERICAN): >60 ML/MIN/1.73 M^2
EST. GFR  (NON AFRICAN AMERICAN): 53 ML/MIN/1.73 M^2
GLUCOSE SERPL-MCNC: 78 MG/DL
HCT VFR BLD AUTO: 33.7 %
HGB BLD-MCNC: 10.5 G/DL
IMM GRANULOCYTES # BLD AUTO: 0.05 K/UL
IMM GRANULOCYTES NFR BLD AUTO: 0.4 %
LYMPHOCYTES # BLD AUTO: 2.2 K/UL
LYMPHOCYTES NFR BLD: 17 %
MAGNESIUM SERPL-MCNC: 1.7 MG/DL
MCH RBC QN AUTO: 27.1 PG
MCHC RBC AUTO-ENTMCNC: 31.2 G/DL
MCV RBC AUTO: 87 FL
MONOCYTES # BLD AUTO: 2.3 K/UL
MONOCYTES NFR BLD: 18.4 %
NEUTROPHILS # BLD AUTO: 8 K/UL
NEUTROPHILS NFR BLD: 63 %
NRBC BLD-RTO: 0 /100 WBC
PHOSPHATE SERPL-MCNC: 2.7 MG/DL
PLATELET # BLD AUTO: 288 K/UL
PMV BLD AUTO: 9.3 FL
POCT GLUCOSE: 163 MG/DL (ref 70–110)
POCT GLUCOSE: 165 MG/DL (ref 70–110)
POCT GLUCOSE: 298 MG/DL (ref 70–110)
POCT GLUCOSE: 67 MG/DL (ref 70–110)
POTASSIUM SERPL-SCNC: 4 MMOL/L
PROT SERPL-MCNC: 5.8 G/DL
RBC # BLD AUTO: 3.88 M/UL
SODIUM SERPL-SCNC: 140 MMOL/L
WBC # BLD AUTO: 12.69 K/UL

## 2019-03-17 PROCEDURE — 25000242 PHARM REV CODE 250 ALT 637 W/ HCPCS: Performed by: INTERNAL MEDICINE

## 2019-03-17 PROCEDURE — 94761 N-INVAS EAR/PLS OXIMETRY MLT: CPT

## 2019-03-17 PROCEDURE — 85025 COMPLETE CBC W/AUTO DIFF WBC: CPT

## 2019-03-17 PROCEDURE — 94640 AIRWAY INHALATION TREATMENT: CPT

## 2019-03-17 PROCEDURE — 25000003 PHARM REV CODE 250: Performed by: INTERNAL MEDICINE

## 2019-03-17 PROCEDURE — 63600175 PHARM REV CODE 636 W HCPCS: Performed by: INTERNAL MEDICINE

## 2019-03-17 PROCEDURE — 99232 PR SUBSEQUENT HOSPITAL CARE,LEVL II: ICD-10-PCS | Mod: GC,,, | Performed by: INTERNAL MEDICINE

## 2019-03-17 PROCEDURE — 36415 COLL VENOUS BLD VENIPUNCTURE: CPT

## 2019-03-17 PROCEDURE — 20600001 HC STEP DOWN PRIVATE ROOM

## 2019-03-17 PROCEDURE — 99232 SBSQ HOSP IP/OBS MODERATE 35: CPT | Mod: GC,,, | Performed by: INTERNAL MEDICINE

## 2019-03-17 PROCEDURE — 99900035 HC TECH TIME PER 15 MIN (STAT)

## 2019-03-17 PROCEDURE — 84100 ASSAY OF PHOSPHORUS: CPT

## 2019-03-17 PROCEDURE — 80053 COMPREHEN METABOLIC PANEL: CPT

## 2019-03-17 PROCEDURE — 94664 DEMO&/EVAL PT USE INHALER: CPT

## 2019-03-17 PROCEDURE — 25000242 PHARM REV CODE 250 ALT 637 W/ HCPCS: Performed by: STUDENT IN AN ORGANIZED HEALTH CARE EDUCATION/TRAINING PROGRAM

## 2019-03-17 PROCEDURE — 83735 ASSAY OF MAGNESIUM: CPT

## 2019-03-17 PROCEDURE — 27000221 HC OXYGEN, UP TO 24 HOURS

## 2019-03-17 RX ORDER — LEVALBUTEROL 1.25 MG/.5ML
1.25 SOLUTION, CONCENTRATE RESPIRATORY (INHALATION) EVERY 8 HOURS
Status: DISCONTINUED | OUTPATIENT
Start: 2019-03-17 | End: 2019-03-18 | Stop reason: HOSPADM

## 2019-03-17 RX ORDER — RAMELTEON 8 MG/1
8 TABLET ORAL NIGHTLY PRN
Status: DISCONTINUED | OUTPATIENT
Start: 2019-03-17 | End: 2019-03-18 | Stop reason: HOSPADM

## 2019-03-17 RX ADMIN — GABAPENTIN 300 MG: 300 CAPSULE ORAL at 09:03

## 2019-03-17 RX ADMIN — CLOPIDOGREL 75 MG: 75 TABLET, FILM COATED ORAL at 08:03

## 2019-03-17 RX ADMIN — HEPARIN SODIUM 5000 UNITS: 5000 INJECTION, SOLUTION INTRAVENOUS; SUBCUTANEOUS at 04:03

## 2019-03-17 RX ADMIN — MONTELUKAST SODIUM 10 MG: 10 TABLET, FILM COATED ORAL at 09:03

## 2019-03-17 RX ADMIN — ASPIRIN 81 MG: 81 TABLET, COATED ORAL at 08:03

## 2019-03-17 RX ADMIN — HYDROXYCHLOROQUINE SULFATE 200 MG: 200 TABLET, FILM COATED ORAL at 08:03

## 2019-03-17 RX ADMIN — HEPARIN SODIUM 5000 UNITS: 5000 INJECTION, SOLUTION INTRAVENOUS; SUBCUTANEOUS at 09:03

## 2019-03-17 RX ADMIN — HEPARIN SODIUM 5000 UNITS: 5000 INJECTION, SOLUTION INTRAVENOUS; SUBCUTANEOUS at 06:03

## 2019-03-17 RX ADMIN — LEVALBUTEROL HYDROCHLORIDE 1.25 MG: 1.25 SOLUTION, CONCENTRATE RESPIRATORY (INHALATION) at 04:03

## 2019-03-17 RX ADMIN — PREDNISONE 60 MG: 20 TABLET ORAL at 08:03

## 2019-03-17 RX ADMIN — CARVEDILOL 12.5 MG: 12.5 TABLET, FILM COATED ORAL at 04:03

## 2019-03-17 RX ADMIN — INSULIN ASPART 3 UNITS: 100 INJECTION, SOLUTION INTRAVENOUS; SUBCUTANEOUS at 04:03

## 2019-03-17 RX ADMIN — HYDROXYCHLOROQUINE SULFATE 200 MG: 200 TABLET, FILM COATED ORAL at 09:03

## 2019-03-17 RX ADMIN — IPRATROPIUM BROMIDE AND ALBUTEROL SULFATE 3 ML: .5; 3 SOLUTION RESPIRATORY (INHALATION) at 08:03

## 2019-03-17 RX ADMIN — ACETAMINOPHEN 650 MG: 325 TABLET ORAL at 08:03

## 2019-03-17 RX ADMIN — ACETAMINOPHEN 650 MG: 325 TABLET ORAL at 11:03

## 2019-03-17 RX ADMIN — CARVEDILOL 12.5 MG: 12.5 TABLET, FILM COATED ORAL at 08:03

## 2019-03-17 NOTE — SUBJECTIVE & OBJECTIVE
Interval History: Weaned down to 1L NC. Had some trouble falling asleep last night. Reports improving SOB since starting high dose steroids. Denies cough and lethargy. Tolerating diet.     Review of Systems   Constitutional: Negative for activity change, chills, fatigue and fever.   HENT: Negative for sore throat.    Eyes: Negative for visual disturbance.   Respiratory: Negative for cough and shortness of breath.    Cardiovascular: Negative for chest pain.   Gastrointestinal: Negative for abdominal pain, diarrhea, nausea and vomiting.   Genitourinary: Negative for dysuria.   Musculoskeletal: Negative for myalgias.   Skin: Negative for rash and wound.   Neurological: Positive for weakness. Negative for dizziness, light-headedness and headaches.     Objective:     Vital Signs (Most Recent):  Temp: 99.5 °F (37.5 °C) (03/17/19 0733)  Pulse: 89 (03/17/19 1037)  Resp: 20 (03/17/19 0840)  BP: 130/62 (03/17/19 1037)  SpO2: (!) 91 % (03/17/19 0840) Vital Signs (24h Range):  Temp:  [98.1 °F (36.7 °C)-99.5 °F (37.5 °C)] 99.5 °F (37.5 °C)  Pulse:  [82-99] 89  Resp:  [16-20] 20  SpO2:  [89 %-96 %] 91 %  BP: (130-185)/(62-77) 130/62     Weight: 74.3 kg (163 lb 12.8 oz)  Body mass index is 28.12 kg/m².    Intake/Output Summary (Last 24 hours) at 3/17/2019 1151  Last data filed at 3/17/2019 0900  Gross per 24 hour   Intake 1025 ml   Output 1276 ml   Net -251 ml      Physical Exam   Constitutional: She is oriented to person, place, and time. She appears well-developed and well-nourished. No distress.   Frail, well-appearing, somnolent   HENT:   Head: Normocephalic and atraumatic.   Eyes: No scleral icterus.   Neck: Neck supple.   Cardiovascular: Normal rate and regular rhythm.   Pulmonary/Chest: Effort normal. No stridor. She has no wheezes. She has no rales.   Diminished breath sounds in RLL   Abdominal: Soft. Bowel sounds are normal. She exhibits no distension. There is no tenderness. There is no guarding.   Musculoskeletal: She  exhibits no edema, tenderness or deformity.   Neurological: She is alert and oriented to person, place, and time.   Skin: Skin is warm and dry. She is not diaphoretic. No erythema. No pallor.   Nursing note and vitals reviewed.      Significant Labs:   Blood Culture:   No results for input(s): LABBLOO in the last 48 hours.  CBC:   Recent Labs   Lab 03/16/19  0350 03/17/19  0402   WBC 11.68 12.69   HGB 9.9* 10.5*   HCT 32.0* 33.7*    288     CMP:   Recent Labs   Lab 03/16/19  0350 03/17/19  0402    140   K 4.5 4.0    108   CO2 24 23   GLU 73 78   BUN 18 17   CREATININE 1.0 1.0   CALCIUM 8.9 9.1   PROT 5.7* 5.8*   ALBUMIN 2.7* 2.6*   BILITOT 0.3 0.3   ALKPHOS 60 57   AST 14 10   ALT 16 14   ANIONGAP 7* 9   EGFRNONAA 53.0* 53.0*     Lipase: No results for input(s): LIPASE in the last 48 hours.  Magnesium:   Recent Labs   Lab 03/16/19  0350 03/17/19  0402   MG 1.8 1.7     All pertinent labs within the past 24 hours have been reviewed.    Significant Imaging: I have reviewed and interpreted all pertinent imaging results/findings within the past 24 hours.

## 2019-03-17 NOTE — PROGRESS NOTES
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 She is on plaquenil, sulfasalazine, and prednisone for her RA. She has multiple prior ICU admissions attributed to . She has been on a prednisone taper since being discharged from the hospital in Jan 2019, tapered down to 10mg prednisone daily last week, which is when her SOB and lethargy worsened. Daughter states that her symptoms normally worsen when she tapers down to 10mg of prednisone. She was evaluated in pulm clinic on 2/4 while taking prednisone 25mg daily at that time with improving respiratory status.   She went to urgent care two days ago, CXR showed new airspace opacities and was started on levaquin. Daughter states they increased the prednisone back to 15mg daily. Pt was evaluated by Dr. Meraz in pulm clinic today and sent to ED for inpatient steroids, antibiotics, and supplemental O2. SpO2 85-88% at rest. Pt denies fever, chills, or other associated symptoms. Not on home O2.    Hospital Course:  Admitted to IM2. Infiltrates on CXR due to  2/2 rheumatoid arthritis with  concern for superimposed CAP. Given 1 dose of solumedrol 125mg on admit, started prednisone 60mg per pulmonology recs. Initially started on vanc and zosyn for broad coverage, narrowed coverage the following day; pt instructed to  finish out her 5 days of levaquin PO at home to complete treatment for CAP. F/u RVP. Instructed to continue plaquenil but hold sulfasalazine until completes levaquin course. Pt to follow up in pulm clinic and rheum clinic in 1 month. CXR ordered in 1 month. On official 6 minute walk test, pt was satting 83% on RA, 93% on 4L at rest, 88% on 4L with exercise, 95% on 4L post-exercise. Qualifies for home O2. Pt has hx of ILD/ crytogenic organizing pneumonia, pulmonary HTN group 3. Weaned down to 1L NC, will attempt 6MWT again on 3/18.    Interval History: Weaned down to 1L NC. Had some trouble falling asleep last night. Reports improving SOB since starting high dose steroids. Denies cough and lethargy. Tolerating diet.     Review of Systems   Constitutional: Negative for activity change, chills, fatigue and fever.   HENT: Negative for sore throat.    Eyes: Negative for visual disturbance.   Respiratory: Negative for cough and shortness of breath.    Cardiovascular: Negative for chest pain.   Gastrointestinal: Negative for abdominal pain, diarrhea, nausea and vomiting.   Genitourinary: Negative for dysuria.   Musculoskeletal: Negative for myalgias.   Skin: Negative for rash and wound.   Neurological: Positive for weakness. Negative for dizziness, light-headedness and headaches.     Objective:     Vital Signs (Most Recent):  Temp: 99.5 °F (37.5 °C) (03/17/19 0733)  Pulse: 89 (03/17/19 1037)  Resp: 20 (03/17/19 0840)  BP: 130/62 (03/17/19 1037)  SpO2: (!) 91 % (03/17/19 0840) Vital Signs (24h Range):  Temp:  [98.1 °F (36.7 °C)-99.5 °F (37.5 °C)] 99.5 °F (37.5 °C)  Pulse:  [82-99] 89  Resp:  [16-20] 20  SpO2:  [89 %-96 %] 91 %  BP: (130-185)/(62-77) 130/62     Weight: 74.3 kg (163 lb 12.8 oz)  Body mass index is 28.12 kg/m².    Intake/Output Summary (Last 24 hours) at 3/17/2019 1151  Last data filed at 3/17/2019 0900  Gross per 24 hour   Intake 1025 ml   Output 1276 ml   Net -251 ml      Physical Exam   Constitutional: She  is oriented to person, place, and time. She appears well-developed and well-nourished. No distress.   Frail, well-appearing, somnolent   HENT:   Head: Normocephalic and atraumatic.   Eyes: No scleral icterus.   Neck: Neck supple.   Cardiovascular: Normal rate and regular rhythm.   Pulmonary/Chest: Effort normal. No stridor. She has no wheezes. She has no rales.   Diminished breath sounds in RLL   Abdominal: Soft. Bowel sounds are normal. She exhibits no distension. There is no tenderness. There is no guarding.   Musculoskeletal: She exhibits no edema, tenderness or deformity.   Neurological: She is alert and oriented to person, place, and time.   Skin: Skin is warm and dry. She is not diaphoretic. No erythema. No pallor.   Nursing note and vitals reviewed.      Significant Labs:   Blood Culture:   No results for input(s): LABBLOO in the last 48 hours.  CBC:   Recent Labs   Lab 03/16/19  0350 03/17/19  0402   WBC 11.68 12.69   HGB 9.9* 10.5*   HCT 32.0* 33.7*    288     CMP:   Recent Labs   Lab 03/16/19  0350 03/17/19  0402    140   K 4.5 4.0    108   CO2 24 23   GLU 73 78   BUN 18 17   CREATININE 1.0 1.0   CALCIUM 8.9 9.1   PROT 5.7* 5.8*   ALBUMIN 2.7* 2.6*   BILITOT 0.3 0.3   ALKPHOS 60 57   AST 14 10   ALT 16 14   ANIONGAP 7* 9   EGFRNONAA 53.0* 53.0*     Lipase: No results for input(s): LIPASE in the last 48 hours.  Magnesium:   Recent Labs   Lab 03/16/19  0350 03/17/19  0402   MG 1.8 1.7     All pertinent labs within the past 24 hours have been reviewed.    Significant Imaging: I have reviewed and interpreted all pertinent imaging results/findings within the past 24 hours.    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 ILD is diagnosis- patient will need O2 for home use based on history of  with acute hypoxemia this admission.      Cryptogenic organizing pneumonia    - Hx of ILD/ crytogenic organizing pneumonia, pulmonary HTN group 3.  -  Hx of recurring infiltrates on CXR due to  2/2 rheumatoid arthritis with multiple prior admissions.  - Infiltrates currently seen in RLL, LML, LLL.  - Symptoms typically worsen when prednisone gets down to 10mg daily  - Consulted pulmonology. Appreciate recs.  - Given 1 dose of solumedrol 125mg on admit, started prednisone 60mg per pulmonology recs.  - Incentive spirometry and flutter valve pulm toilet treatment prn  - Switched from duonebs to xoponex due to tachycardia  - continue home singulair   - Vancomycin and zosyn for empiric pneumonia coverage- narrowed to complete total of 7 day course of levaquin that was started outpatient.  - Follow up resp viral panel, sputum culture  - O2 NC prn  - Continue plaquenil 200mg BID. Hold sulfasalazine until complete course of levaquin.  - Pt to follow up in pulm clinic and rheum clinic in 1 month. CXR ordered in 1 month.   - On official 6 minute walk test, pt was satting 83% on RA, 93% on 4L at rest, 88% on 4L with exercise, 95% on 4L post-exercise. Qualifies for home O2.      CAP (community acquired pneumonia)    - possible CAP superimposed on   - Initially started on vanc and zosyn for broad coverage, narrowed coverage the following day; pt instructed to finish out her 5 days of levaquin PO to complete treatment for CAP.   - Resp culture with few candida  - RVP negative     KERMIT (acute kidney injury)    - Cr 1.5-> 1.3-> 1.0  - Baseline Cr 1.0  - 1L bolus NS  - Renally dose antibiotics. Avoid nephrotoxic medications.  - Continue to monitor with hydration  - since resolved     Seropositive rheumatoid arthritis of multiple sites    - On home plaquenil, sulfasalazine, and prednisone daily for RA  -  2/2 RA  - continue plaquenil 200mg BID. Hold sulfasalazine until complete course of levaquin.  - follows with Dr. Rouse rheumatology. F/u in rheum clinic in 1 month.     Dystonia    - follows with Dr. Giang in neurology  - continue home gabapentin 300mg qhs      Long-term use of immunosuppressant medication    - On home plaquenil, sulfasalazine, and prednisone daily for RA  - see seropositive RA  - hold sulfasalazine until complete course of levaquin     History of stroke    - On ASA, plavix daily  - Wheelchair bound     Essential hypertension    - continue home coreg  - hold lasix due to KERMIT       VTE Risk Mitigation (From admission, onward)        Ordered     IP VTE HIGH RISK PATIENT  Once      03/14/19 0239     heparin (porcine) injection 5,000 Units  Every 8 hours      03/13/19 1557        Dom Street MD  Department of Hospital Medicine   [facility]

## 2019-03-17 NOTE — PLAN OF CARE
Problem: Adult Inpatient Plan of Care  Goal: Plan of Care Review  Outcome: Ongoing (interventions implemented as appropriate)  Plan of care reviewed with pt, verbalized understanding  Answered questions  Free from falls and injury  Afebrile  ST on tele, PVCs Trigems  ANTB PO daily  No insulin coverage needed  Will continue to monitor

## 2019-03-17 NOTE — PLAN OF CARE
Problem: Adult Inpatient Plan of Care  Goal: Plan of Care Review  Outcome: Ongoing (interventions implemented as appropriate)  Reviewed with pt. Her current plan of care, with regards to the 6minute walking challenge and the possible of going home with 02 therapy... Pt. Verbalized understanding of information given

## 2019-03-17 NOTE — ASSESSMENT & PLAN NOTE
- Hx of ILD/ crytogenic organizing pneumonia, pulmonary HTN group 3.  - Hx of recurring infiltrates on CXR due to  2/2 rheumatoid arthritis with multiple prior admissions.  - Infiltrates currently seen in RLL, LML, L[facility].  - Symptoms typically worsen when prednisone gets down to 10mg daily  - Consulted pulmonology. Appreciate recs.  - Given 1 dose of solumedrol 125mg on admit, started prednisone 60mg per pulmonology recs.  - Incentive spirometry and flutter valve pulm toilet treatment prn  - Switched from duonebs to xoponex due to tachycardia  - continue home singulair   - Vancomycin and zosyn for empiric pneumonia coverage- narrowed to complete total of 7 day course of levaquin that was started outpatient.  - Follow up resp viral panel, sputum culture  - O2 NC prn  - Continue plaquenil 200mg BID. Hold sulfasalazine until complete course of levaquin.  - Pt to follow up in pulm clinic and rheum clinic in 1 month. CXR ordered in 1 month.   - On official 6 minute walk test, pt was satting 83% on RA, 93% on 4L at rest, 88% on 4L with exercise, 95% on 4L post-exercise. Qualifies for home O2.

## 2019-03-18 VITALS
WEIGHT: 165.38 LBS | SYSTOLIC BLOOD PRESSURE: 137 MMHG | TEMPERATURE: 99 F | DIASTOLIC BLOOD PRESSURE: 60 MMHG | BODY MASS INDEX: 28.24 KG/M2 | OXYGEN SATURATION: 96 % | HEART RATE: 88 BPM | HEIGHT: 64 IN | RESPIRATION RATE: 18 BRPM

## 2019-03-18 LAB
ANION GAP SERPL CALC-SCNC: 10 MMOL/L
ANION GAP SERPL CALC-SCNC: 9 MMOL/L
ANISOCYTOSIS BLD QL SMEAR: SLIGHT
BACTERIA BLD CULT: NORMAL
BACTERIA BLD CULT: NORMAL
BASOPHILS # BLD AUTO: 0.01 K/UL
BASOPHILS NFR BLD: 0.1 %
BUN SERPL-MCNC: 21 MG/DL
BUN SERPL-MCNC: 21 MG/DL
CALCIUM SERPL-MCNC: 9 MG/DL
CALCIUM SERPL-MCNC: 9.1 MG/DL
CHLORIDE SERPL-SCNC: 107 MMOL/L
CHLORIDE SERPL-SCNC: 107 MMOL/L
CO2 SERPL-SCNC: 21 MMOL/L
CO2 SERPL-SCNC: 23 MMOL/L
CREAT SERPL-MCNC: 1 MG/DL
CREAT SERPL-MCNC: 1 MG/DL
DIFFERENTIAL METHOD: ABNORMAL
EOSINOPHIL # BLD AUTO: 0.2 K/UL
EOSINOPHIL NFR BLD: 1.3 %
ERYTHROCYTE [DISTWIDTH] IN BLOOD BY AUTOMATED COUNT: 14.4 %
EST. GFR  (AFRICAN AMERICAN): >60 ML/MIN/1.73 M^2
EST. GFR  (AFRICAN AMERICAN): >60 ML/MIN/1.73 M^2
EST. GFR  (NON AFRICAN AMERICAN): 53 ML/MIN/1.73 M^2
EST. GFR  (NON AFRICAN AMERICAN): 53 ML/MIN/1.73 M^2
GLUCOSE SERPL-MCNC: 67 MG/DL
GLUCOSE SERPL-MCNC: 69 MG/DL
HCT VFR BLD AUTO: 32.3 %
HGB BLD-MCNC: 10 G/DL
HYPOCHROMIA BLD QL SMEAR: ABNORMAL
IMM GRANULOCYTES # BLD AUTO: 0.08 K/UL
IMM GRANULOCYTES NFR BLD AUTO: 0.6 %
LYMPHOCYTES # BLD AUTO: 2.5 K/UL
LYMPHOCYTES NFR BLD: 19.1 %
MAGNESIUM SERPL-MCNC: 1.9 MG/DL
MCH RBC QN AUTO: 26.7 PG
MCHC RBC AUTO-ENTMCNC: 31 G/DL
MCV RBC AUTO: 86 FL
MONOCYTES # BLD AUTO: 2.1 K/UL
MONOCYTES NFR BLD: 16.5 %
NEUTROPHILS # BLD AUTO: 8 K/UL
NEUTROPHILS NFR BLD: 62.4 %
NRBC BLD-RTO: 0 /100 WBC
OVALOCYTES BLD QL SMEAR: ABNORMAL
PLATELET # BLD AUTO: 314 K/UL
PLATELET BLD QL SMEAR: ABNORMAL
PMV BLD AUTO: 9.4 FL
POCT GLUCOSE: 108 MG/DL (ref 70–110)
POCT GLUCOSE: 74 MG/DL (ref 70–110)
POIKILOCYTOSIS BLD QL SMEAR: SLIGHT
POLYCHROMASIA BLD QL SMEAR: ABNORMAL
POTASSIUM SERPL-SCNC: 3.9 MMOL/L
POTASSIUM SERPL-SCNC: 3.9 MMOL/L
RBC # BLD AUTO: 3.74 M/UL
SODIUM SERPL-SCNC: 138 MMOL/L
SODIUM SERPL-SCNC: 139 MMOL/L
WBC # BLD AUTO: 12.84 K/UL

## 2019-03-18 PROCEDURE — 94799 UNLISTED PULMONARY SVC/PX: CPT

## 2019-03-18 PROCEDURE — 94640 AIRWAY INHALATION TREATMENT: CPT

## 2019-03-18 PROCEDURE — 27000221 HC OXYGEN, UP TO 24 HOURS

## 2019-03-18 PROCEDURE — 94761 N-INVAS EAR/PLS OXIMETRY MLT: CPT

## 2019-03-18 PROCEDURE — 36415 COLL VENOUS BLD VENIPUNCTURE: CPT

## 2019-03-18 PROCEDURE — 99900035 HC TECH TIME PER 15 MIN (STAT)

## 2019-03-18 PROCEDURE — 27000646 HC AEROBIKA DEVICE

## 2019-03-18 PROCEDURE — 25000003 PHARM REV CODE 250: Performed by: INTERNAL MEDICINE

## 2019-03-18 PROCEDURE — 99239 HOSP IP/OBS DSCHRG MGMT >30: CPT | Mod: GC,,, | Performed by: INTERNAL MEDICINE

## 2019-03-18 PROCEDURE — 25000242 PHARM REV CODE 250 ALT 637 W/ HCPCS: Performed by: STUDENT IN AN ORGANIZED HEALTH CARE EDUCATION/TRAINING PROGRAM

## 2019-03-18 PROCEDURE — 99239 PR HOSPITAL DISCHARGE DAY,>30 MIN: ICD-10-PCS | Mod: GC,,, | Performed by: INTERNAL MEDICINE

## 2019-03-18 PROCEDURE — 94664 DEMO&/EVAL PT USE INHALER: CPT

## 2019-03-18 PROCEDURE — 97530 THERAPEUTIC ACTIVITIES: CPT

## 2019-03-18 PROCEDURE — 97116 GAIT TRAINING THERAPY: CPT

## 2019-03-18 PROCEDURE — 63600175 PHARM REV CODE 636 W HCPCS: Performed by: INTERNAL MEDICINE

## 2019-03-18 PROCEDURE — 80048 BASIC METABOLIC PNL TOTAL CA: CPT

## 2019-03-18 PROCEDURE — 85025 COMPLETE CBC W/AUTO DIFF WBC: CPT

## 2019-03-18 PROCEDURE — 83735 ASSAY OF MAGNESIUM: CPT

## 2019-03-18 RX ORDER — SULFASALAZINE 500 MG/1
1000 TABLET, DELAYED RELEASE ORAL 2 TIMES DAILY
Qty: 120 TABLET | Refills: 2
Start: 2019-03-18 | End: 2019-06-04 | Stop reason: SDUPTHER

## 2019-03-18 RX ADMIN — CLOPIDOGREL 75 MG: 75 TABLET, FILM COATED ORAL at 08:03

## 2019-03-18 RX ADMIN — HEPARIN SODIUM 5000 UNITS: 5000 INJECTION, SOLUTION INTRAVENOUS; SUBCUTANEOUS at 02:03

## 2019-03-18 RX ADMIN — LEVALBUTEROL HYDROCHLORIDE 1.25 MG: 1.25 SOLUTION, CONCENTRATE RESPIRATORY (INHALATION) at 01:03

## 2019-03-18 RX ADMIN — ASPIRIN 81 MG: 81 TABLET, COATED ORAL at 08:03

## 2019-03-18 RX ADMIN — PREDNISONE 60 MG: 20 TABLET ORAL at 08:03

## 2019-03-18 RX ADMIN — LEVALBUTEROL HYDROCHLORIDE 1.25 MG: 1.25 SOLUTION, CONCENTRATE RESPIRATORY (INHALATION) at 03:03

## 2019-03-18 RX ADMIN — CARVEDILOL 12.5 MG: 12.5 TABLET, FILM COATED ORAL at 08:03

## 2019-03-18 RX ADMIN — CARVEDILOL 12.5 MG: 12.5 TABLET, FILM COATED ORAL at 04:03

## 2019-03-18 RX ADMIN — LEVOFLOXACIN 750 MG: 750 TABLET, FILM COATED ORAL at 08:03

## 2019-03-18 RX ADMIN — HEPARIN SODIUM 5000 UNITS: 5000 INJECTION, SOLUTION INTRAVENOUS; SUBCUTANEOUS at 06:03

## 2019-03-18 RX ADMIN — HYDROXYCHLOROQUINE SULFATE 200 MG: 200 TABLET, FILM COATED ORAL at 08:03

## 2019-03-18 RX ADMIN — LEVALBUTEROL HYDROCHLORIDE 1.25 MG: 1.25 SOLUTION, CONCENTRATE RESPIRATORY (INHALATION) at 07:03

## 2019-03-18 RX ADMIN — PANTOPRAZOLE SODIUM 40 MG: 40 TABLET, DELAYED RELEASE ORAL at 08:03

## 2019-03-18 NOTE — PLAN OF CARE
Problem: Adult Inpatient Plan of Care  Goal: Plan of Care Review  Outcome: Ongoing (interventions implemented as appropriate)  Plan of care reviewed with pt, verbalized understanding  Answered questions  Free from falls and injury  Afebrile  SR on tele  ANTB PO daily  No insulin coverage needed  Will continue to monitor

## 2019-03-18 NOTE — SUBJECTIVE & OBJECTIVE
Interval History: On1L NC. Denies SOB, cough, lethargy. Tolerating diet. 6MWT today.    Review of Systems   Constitutional: Negative for activity change, chills, fatigue and fever.   HENT: Negative for sore throat.    Eyes: Negative for visual disturbance.   Respiratory: Negative for cough and shortness of breath.    Cardiovascular: Negative for chest pain.   Gastrointestinal: Negative for abdominal pain, diarrhea, nausea and vomiting.   Genitourinary: Negative for dysuria.   Musculoskeletal: Negative for myalgias.   Skin: Negative for rash and wound.   Neurological: Positive for weakness. Negative for dizziness, light-headedness and headaches.     Objective:     Vital Signs (Most Recent):  Temp: 97.6 °F (36.4 °C) (03/18/19 0808)  Pulse: 80 (03/18/19 0808)  Resp: 18 (03/18/19 0808)  BP: (!) 146/67 (03/18/19 0808)  SpO2: (!) 91 % (03/18/19 0808) Vital Signs (24h Range):  Temp:  [97.5 °F (36.4 °C)-98.7 °F (37.1 °C)] 97.6 °F (36.4 °C)  Pulse:  [75-92] 80  Resp:  [16-20] 18  SpO2:  [90 %-98 %] 91 %  BP: (101-169)/(53-74) 146/67     Weight: 75 kg (165 lb 5.5 oz)  Body mass index is 28.38 kg/m².    Intake/Output Summary (Last 24 hours) at 3/18/2019 0852  Last data filed at 3/18/2019 0600  Gross per 24 hour   Intake 714 ml   Output 400 ml   Net 314 ml      Physical Exam   Constitutional: She is oriented to person, place, and time. She appears well-developed and well-nourished. No distress.   Frail, well-appearing, somnolent   HENT:   Head: Normocephalic and atraumatic.   Eyes: No scleral icterus.   Neck: Neck supple.   Cardiovascular: Normal rate and regular rhythm.   Pulmonary/Chest: Effort normal. No stridor. She has no wheezes. She has no rales.   Abdominal: Soft. Bowel sounds are normal. She exhibits no distension. There is no tenderness. There is no guarding.   Musculoskeletal: She exhibits no edema, tenderness or deformity.   Neurological: She is alert and oriented to person, place, and time.   Skin: Skin is warm and  dry. She is not diaphoretic. No erythema. No pallor.   Psychiatric: She has a normal mood and affect. Her behavior is normal.   Nursing note and vitals reviewed.      Significant Labs:   Blood Culture:   No results for input(s): LABBLOO in the last 48 hours.  CBC:   Recent Labs   Lab 03/17/19 0402   WBC 12.69   HGB 10.5*   HCT 33.7*        CMP:   Recent Labs   Lab 03/17/19 0402 03/18/19  0536    138  139   K 4.0 3.9  3.9    107  107   CO2 23 21*  23   GLU 78 67*  69*   BUN 17 21  21   CREATININE 1.0 1.0  1.0   CALCIUM 9.1 9.1  9.0   PROT 5.8*  --    ALBUMIN 2.6*  --    BILITOT 0.3  --    ALKPHOS 57  --    AST 10  --    ALT 14  --    ANIONGAP 9 10  9   EGFRNONAA 53.0* 53.0*  53.0*     Lipase: No results for input(s): LIPASE in the last 48 hours.  Magnesium:   Recent Labs   Lab 03/17/19 0402 03/18/19  0536   MG 1.7 1.9     All pertinent labs within the past 24 hours have been reviewed.    Significant Imaging: I have reviewed and interpreted all pertinent imaging results/findings within the past 24 hours.

## 2019-03-18 NOTE — ASSESSMENT & PLAN NOTE
- On home plaquenil, sulfasalazine, and prednisone daily for RA  - see seropositive RA  - hold sulfasalazine until complete course of levaquin. Last dose levaquin on 3/18, ok to resume sulfasalazine upon discharge.

## 2019-03-18 NOTE — ASSESSMENT & PLAN NOTE
- possible CAP superimposed on   - Initially started on vanc and zosyn for broad coverage, narrowed coverage the following day; pt instructed to finish out her 5 days of levaquin PO to complete treatment for CAP. Last dose of levaquin on 3/18.   - Resp culture with few candida  - RVP negative

## 2019-03-18 NOTE — ASSESSMENT & PLAN NOTE
- Hx of ILD/ crytogenic organizing pneumonia, pulmonary HTN group 3.  - Hx of recurring infiltrates on CXR due to  2/2 rheumatoid arthritis with multiple prior admissions.  - Infiltrates currently seen in RLL, LML, L[facility].  - Symptoms typically worsen when prednisone gets down to 10mg daily  - Consulted pulmonology. Appreciate recs.  - Given 1 dose of solumedrol 125mg on admit, started prednisone 60mg per pulmonology recs.  - Incentive spirometry and flutter valve pulm toilet treatment prn  - Switched from duonebs to xoponex due to tachycardia  - continue home singulair   - Vancomycin and zosyn for empiric pneumonia coverage- narrowed to complete total of 7 day course of levaquin that was started outpatient.  - Follow up resp viral panel, sputum culture  - O2 NC prn  - Continue plaquenil 200mg BID. Hold sulfasalazine until complete course of levaquin.  - Pt to follow up in pulm clinic and rheum clinic in 1 month. CXR ordered in 1 month.   - On official 6 minute walk test, pt was satting 83% on RA, 93% on 4L at rest, 88% on 4L with exercise, 95% on 4L post-exercise.   - Pt participated in 6 MWT with therapist on 3/18, ambulating x 246 feet (74.98 meters) on room air with no seated rest breaks required. Pt's 02 sats remained between 89-92% upon exertion, and were stable at 92% following testing. Passed 6MWT, stable for discharge home with home health PT/OT services.

## 2019-03-18 NOTE — PLAN OF CARE
Problem: Physical Therapy Goal  Goal: Physical Therapy Goal  Goals to be met by: 3/22/19    Patient will increase functional independence with mobility by performin. Supine to sit with Modified [place] --  MET3/18  2. Sit to supine with Modified [place]-- MET 3/18  3. Sit to stand transfer with Modified [place]  4. Gait  x 100 feet with Stand-by Assistance using Rolling Walker. -- MET3/18   5. Ascend/descend 5 stairs with right Handrails  With contact Guard Assistance.   6. Lower extremity exercise program x30 reps per handout, with independence     Outcome: Outcome(s) achieved Date Met: 19  Pt has progressed well with functional mobility during therapy course, reaching satisfactory goal achievement. Pt discharged from acute PT services  no further needs. 6 MWT performed this visit- see note for details. Recommending pt discharge home with HHPT.     Laura Elkins, PT, DPT   3/18/2019  Pager: [phone]

## 2019-03-18 NOTE — PLAN OF CARE
03/18/19 1048   Post-Acute Status   Post-Acute Authorization Home Health/Hospice   Discharge Delays (!) Other  (Pt still weaning O2.)     Pt to complete 6MWT to determine O2 needs. Discharge status pending need for O2 and Medical Stability.

## 2019-03-18 NOTE — NURSING
Pt given DC instructions  Verbalized understanding  Reviewed post-procedure instructions  Aware to  RX from pharm  Tele removed, and put in the dirty drawer  D/C IV awaiting transport  Awaiting escort

## 2019-03-18 NOTE — PLAN OF CARE
03/18/19 1255   Final Note   Assessment Type Final Discharge Note   Anticipated Discharge Disposition Home-Health   What phone number can be called within the next 1-3 days to see how you are doing after discharge? [phone]   Hospital Follow Up  Appt(s) scheduled? Yes   Discharge plans and expectations educations in teach back method with documentation complete? Yes   Right Care Referral Info   Post Acute Recommendation Home-care   Referral Type Home Health   Facility Name [facility]      Future Appointments   Date Time Provider Department Center   3/20/2019  1:00 PM Dee Thomson MD [facility]   4/17/2019 10:45 AM Edgar Addison MD [facility] ENT [place] [facility]   4/17/2019 11:30 AM AUDIO[facility]   4/23/2019  1:30 PM Jessica Thacker MD [facility] HAND [facility]   5/6/2019  3:00 PM Baldomero Francis MD [facility] PULMSVC [place] [facility]   5/16/2019 11:00 AM LAB, SAME DAY [facility]   5/21/2019 11:30 AM Lonnie Rouse MD [facility]

## 2019-03-18 NOTE — PT/OT/SLP PROGRESS
"Physical Therapy Treatment  & Discharge    Patient Name:  Selma Lux MD   MRN:  [id number]    Recommendations:     Discharge Recommendations:  Home with HHPT  Discharge Equipment Recommendations: none   Barriers to discharge: none    Assessment:     Selma Lux MD is a 83 y.o. 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 Pt has progressed well with functional mobility during therapy course, reaching satisfactory goal achievement. Pt participated in 6 MWT with therapist this visit, ambulating x 246 feet (74.98 meters) on room air with no seated rest breaks required. Pt's 02 sats remained between 89-92% upon exertion, and were stable at 92% following testing.  Pt discharged from acute PT services 2/2 no further needs. Recommending pt discharge home with family support and home health therapy evaluation.     Recent Surgery: * No surgery found *      Plan:     Pt discharged from PT services.     Subjective     Chief Complaint: pt reported no complaints   Patient/Family Comments/goals: Pt reported feeling "much better"; ready to go home  Pain/Comfort:  · Pain Rating 1: 0/10  · Pain Rating Post-Intervention 1: 0/10      Objective:     Communicated with RN prior to session.  Patient found supine with telemetry, oxygen, peripheral IV  upon PT entry to room.     General Precautions: Standard, fall   Orthopedic Precautions:N/A   Braces: N/A     Functional Mobility:    Bed Mobility:   · Supine > Sit: modified independence   · Sit > Supine: N/T     Transfers:   · Sit <> Stand Transfer: modified independence from EOB with RW                   Gait:  · Distance: 320 ft. total  · Assistance level: supervision   · Assistive Device: RW  · Gait Assessment: recipricol gait pattern with decreased step length, slighlty forward flexed posture. No LOB.   *6MWT also performed as portion of gait & endurance assessment included below.        6- Minute Walk Test (6 " MWT): to assess distance walked over 6 minutes as a sub-maximal test of aerobic capacity and endurance     · Supplemental Oxygen: No; pt on room air throughout   · Mobility Aid: Rolling Walker  · Rest breaks/comments: no seated rest breaks required; 1 standing rest break    · Total Distance walked: 246 feet (74.98 meters)   · Was test terminated? No     Vital signs pre-assessment: completed in sitting EOB at rest  BP: 126/60 mmHg   HR: 80 bpm  O2 sats: 91%      Vital signs monitored during walk test:  HR: 85-89 bpm   O2 sats: 89-92 %      Vital signs post-assessment: completed in sitting up in chair at rest, following 3 minute rest break  BP: 141/68 mmHg   HR: 82 bpm  O2 sats: 92 %      AM-PAC 6 CLICK MOBILITY  Turning over in bed (including adjusting bedclothes, sheets and blankets)?: 4  Sitting down on and standing up from a chair with arms (e.g., wheelchair, bedside commode, etc.): 4  Moving from lying on back to sitting on the side of the bed?: 4  Moving to and from a bed to a chair (including a wheelchair)?: 4  Need to walk in hospital room?: 4  Climbing 3-5 steps with a railing?: 3  Basic Mobility Total Score: 23       Therapeutic Activities and Exercises:  Therapist reinforced education re: safety with mobility, activity recommendations, and discharge from PT 2/2 goal achievement. Pt verbalized understanding and expressed no further concerns/questions.     Patient left up in chair with all lines intact, call button in reach and RN notified.    GOALS:   Multidisciplinary Problems     Physical Therapy Goals     Not on file          Multidisciplinary Problems (Resolved)        Problem: Physical Therapy Goal    Goal Priority Disciplines Outcome Goal Variances Interventions   Physical Therapy Goal   (Resolved)     PT, PT/OT Outcome(s) achieved     Description:  Goals to be met by: 3/22/19    Patient will increase functional independence with mobility by performin. Supine to sit with Modified [place] --   MET3/18  2. Sit to supine with Modified [place]-- MET 3/18  3. Sit to stand transfer with Modified [place]  4. Gait  x 100 feet with Stand-by Assistance using Rolling Walker. -- MET3/18   5. Ascend/descend 5 stairs with right Handrails  With contact Guard Assistance.   6. Lower extremity exercise program x30 reps per handout, with independence                       Time Tracking:     PT Received On: 03/18/19  PT Start Time: 1036     PT Stop Time: 1102  PT Total Time (min): 26 min     Billable Minutes: Gait Training 10 min and Therapeutic Activity 15 min    Treatment Type: Treatment  PT/PTA: PT     PTA Visit Number: 0     Laura Elkins PT, DPT   3/18/2019  Pager: [phone]

## 2019-03-18 NOTE — DISCHARGE SUMMARY
[facility] Hospital Medicine  Discharge Summary      Patient Name: Selma Rosado MD Jose J  MRN: [id number]  Admission Date: 3/13/2019  Hospital Length of Stay: 5 days  Discharge Date and Time:  03/18/2019 11:49 AM  Attending Physician: Sejal Nelson MD   Discharging Provider: Dom Street MD  Primary Care Provider: Bhargav Hirsch MD  Hospital Medicine Team: [facility]OSP MED 2 Dom Street MD    HPI:   83 year old woman with hx of interstitial lung disease/ cryptogenic organizing pneumonia secondary to seropositive RA, HTN, pulmonary HTN group 3, CVA presents with 5 days of increasing SOB, minimally productive cough, and generalized weakness. She is on plaquenil, sulfasalazine, and prednisone for her RA. She has multiple prior ICU admissions attributed to . She has been on a prednisone taper since being discharged from the hospital in Jan 2019, tapered down to 10mg prednisone daily last week, which is when her SOB and lethargy worsened. Daughter states that her symptoms normally worsen when she tapers down to 10mg of prednisone. She was evaluated in pulm clinic on 2/4 while taking prednisone 25mg daily at that time with improving respiratory status.   She went to urgent care two days ago, CXR showed new airspace opacities and was started on levaquin. Daughter states they increased the prednisone back to 15mg daily. Pt was evaluated by Dr. Meraz in pulm clinic today and sent to ED for inpatient steroids, antibiotics, and supplemental O2. SpO2 85-88% at rest. Pt denies fever, chills, or other associated symptoms. Not on home O2.    * No surgery found *      Hospital Course:   Admitted to 2. Infiltrates on CXR due to  2/2 rheumatoid arthritis with  concern for superimposed CAP. Given 1 dose of solumedrol 125mg on admit, started prednisone 60mg per pulmonology recs. Initially started on vanc and zosyn for broad coverage, narrowed coverage the following day; pt  instructed to finish out her 5 days of levaquin PO at home to complete treatment for CAP. F/u RVP. Instructed to continue plaquenil but hold sulfasalazine until completes levaquin course. Pt to follow up in pulm clinic and rheum clinic in 1 month. CXR ordered in 1 month. On official 6 minute walk test, pt was satting 83% on RA, 93% on 4L at rest, 88% on 4L with exercise, 95% on 4L post-exercise. Pt has hx of ILD/ crytogenic organizing pneumonia, pulmonary HTN group 3. Weaned down to 1L NC, will attempt 6MWT again on 3/18. Pt participated in 6 MWT with therapist on 3/18, ambulating x 246 feet (74.98 meters) on room air with no seated rest breaks required. Pt's 02 sats remained between 89-92% upon exertion, and were stable at 92% following testing. Passed 6MWT, stable for discharge home with home health PT/OT services. Last dose of levaquin given on 3/18. Ok to resume sulfasalazine upon discharge.    Vitals:    03/18/19 0745 03/18/19 0808 03/18/19 0900 03/18/19 1130   BP:  (!) 146/67  137/60   BP Location:  Right arm     Patient Position:  Lying  Sitting   Pulse: 78 80 88 79   Resp: 16 18 18   Temp:  97.6 °F (36.4 °C)  98.6 °F (37 °C)   TempSrc:  Axillary  Oral   SpO2: 95% (!) 91%  (!) 91%   Weight:       Height:         Physical Exam   Constitutional: She is oriented to person, place, and time. She appears well-developed and well-nourished. No distress.   Frail, well-appearing, somnolent   HENT:   Head: Normocephalic and atraumatic.   Eyes: No scleral icterus.   Neck: Neck supple.   Cardiovascular: Normal rate and regular rhythm.   Pulmonary/Chest: Effort normal. Clear breath sounds. No stridor. She has no wheezes. She has no rales.   Abdominal: Soft. Bowel sounds are normal. She exhibits no distension. There is no tenderness. There is no guarding.   Musculoskeletal: She exhibits no edema, tenderness or deformity.   Neurological: She is alert and oriented to person, place, and time.   Skin: Skin is warm and dry. She  is not diaphoretic. No erythema. No pallor.   Nursing note and vitals reviewed.    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 ILD is diagnosis- patient will need O2 for home use based on history of  with acute hypoxemia this admission.      Cryptogenic organizing pneumonia    - Hx of ILD/ crytogenic organizing pneumonia, pulmonary HTN group 3.  - Hx of recurring infiltrates on CXR due to  2/2 rheumatoid arthritis with multiple prior admissions.  - Infiltrates currently seen in RLL, LML, LLL.  - Symptoms typically worsen when prednisone gets down to 10mg daily  - Consulted pulmonology. Appreciate recs.  - Given 1 dose of solumedrol 125mg on admit, started prednisone 60mg per pulmonology recs.  - Incentive spirometry and flutter valve pulm toilet treatment prn  - Switched from duonebs to xoponex due to tachycardia  - continue home singulair   - Vancomycin and zosyn for empiric pneumonia coverage- narrowed to complete total of 7 day course of levaquin that was started outpatient.  - Follow up resp viral panel, sputum culture  - O2 NC prn  - Continue plaquenil 200mg BID. Hold sulfasalazine until complete course of levaquin.  - Pt to follow up in pulm clinic and rheum clinic in 1 month. CXR ordered in 1 month.   - On official 6 minute walk test, pt was satting 83% on RA, 93% on 4L at rest, 88% on 4L with exercise, 95% on 4L post-exercise.   - Pt participated in 6 MWT with therapist on 3/18, ambulating x 246 feet (74.98 meters) on room air with no seated rest breaks required. Pt's 02 sats remained between 89-92% upon exertion, and were stable at 92% following testing. Passed 6MWT, stable for discharge home with home health PT/OT services.     CAP (community acquired pneumonia)    -  possible CAP superimposed on   - Initially started on vanc and zosyn for broad coverage, narrowed coverage the following day; pt instructed to finish out her 5 days of levaquin PO to complete treatment for CAP. Last dose of levaquin on 3/18.   - Resp culture with few candida  - RVP negative     KERMIT (acute kidney injury)    - Cr 1.5-> 1.3-> 1.0  - Baseline Cr 1.0  - 1L bolus NS  - Renally dose antibiotics. Avoid nephrotoxic medications.  - Continue to monitor with hydration  - since resolved     Seropositive rheumatoid arthritis of multiple sites    - On home plaquenil, sulfasalazine, and prednisone daily for RA  -  2/2 RA  - continue plaquenil 200mg BID. Hold sulfasalazine until complete course of levaquin.  - follows with Dr. Rouse rheumatology. F/u in rheum clinic in 1 month.     Dystonia    - follows with Dr. Giang in neurology  - continue home gabapentin 300mg qhs     Long-term use of immunosuppressant medication    - On home plaquenil, sulfasalazine, and prednisone daily for RA  - see seropositive RA  - hold sulfasalazine until complete course of levaquin. Last dose levaquin on 3/18, ok to resume sulfasalazine upon discharge.     History of stroke    - On ASA, plavix daily  - Wheelchair bound     Essential hypertension    - continue home coreg  - held lasix due to KERMIT       Final Active Diagnoses:    Diagnosis Date Noted POA    PRINCIPAL PROBLEM:  Interstitial lung disease due to connective tissue disease [M35.8, J84.89] 03/14/2019 Yes    Cryptogenic organizing pneumonia [J84.116] 01/24/2019 Yes    CAP (community acquired pneumonia) [J18.9] 03/14/2019 Yes    KERMIT (acute kidney injury) [N17.9] 03/13/2019 Yes    Seropositive rheumatoid arthritis of multiple sites [M05.79] 10/21/2015 Yes    Pulmonary hypertension due to interstitial lung disease [I27.23, J84.9] 03/14/2019 Yes    Long-term use of immunosuppressant medication [Z79.899] 12/16/2018 Not Applicable    Dystonia [G24.9] 12/16/2018 Yes     "Acute hypoxemic respiratory failure [J96.01] 04/11/2018 Yes    History of stroke [Z86.73] 12/12/2017 Not Applicable    Essential hypertension [I10] 06/16/2016 Yes      Problems Resolved During this Admission:       Discharged Condition: stable    Disposition: Home or Self Care    Follow Up:  Follow-up Information     [facility] PULMONARY MEDICINE In 1 week.    Specialty:  Pulmonology  Contact information:  [address]  [facility] [id number] [phone]               Patient Instructions:      OXYGEN FOR HOME USE     Order Specific Question Answer Comments   Liter Flow 2    Duration Continuous    Qualifying SpO2: 86% room air    Testing done at: Rest    Route nasal cannula    Portable mode: continuous    Device home concentrator with portable unit    Length of need (in months): 12 mos    Patient condition with qualifying saturation other chronic pulmonary condition severe interstitial lung disease. Pulmonary hypertension   Height: 5' 4" (1.626 m)    Weight: 73.9 kg (162 lb 14.7 oz)    Does patient have medical equipment at home? walker, rolling    Alternative treatment measures have been tried or considered and deemed clinically ineffective. Yes    Vendor: Other (use comments)    Expected Date of Delivery: 3/14/2019      X-Ray Chest PA And Lateral   Standing Status: Future Standing Exp. Date: 03/14/20     Order Specific Question Answer Comments   May the Radiologist modify the order per protocol to meet the clinical needs of the patient? Yes      Ambulatory consult to Pulmonology   Referral Priority: Routine Referral Type: Consultation   Referral Reason: Specialty Services Required   Requested Specialty: Pulmonary Disease   Number of Visits Requested: 1       Significant Diagnostic Studies: Labs:   BMP:   Recent Labs   Lab 03/17/19  0402 03/18/19  0536   GLU 78 67*  69*    138  139   K 4.0 3.9  3.9    107  107   CO2 23 21*  23   BUN 17 21  21   CREATININE 1.0 1.0  1.0   CALCIUM 9.1 " 9.1  9.0   MG 1.7 1.9   , CMP   Recent Labs   Lab 03/17/19  0402 03/18/19  0536    138  139   K 4.0 3.9  3.9    107  107   CO2 23 21*  23   GLU 78 67*  69*   BUN 17 21  21   CREATININE 1.0 1.0  1.0   CALCIUM 9.1 9.1  9.0   PROT 5.8*  --    ALBUMIN 2.6*  --    BILITOT 0.3  --    ALKPHOS 57  --    AST 10  --    ALT 14  --    ANIONGAP 9 10  9   ESTGFRAFRICA >60.0 >60.0  >60.0   EGFRNONAA 53.0* 53.0*  53.0*    and CBC   Recent Labs   Lab 03/17/19  0402 03/18/19  0536   WBC 12.69 12.84*   HGB 10.5* 10.0*   HCT 33.7* 32.3*    314       Pending Diagnostic Studies:     None         Medications:  Reconciled Home Medications:      Medication List      START taking these medications    sulfaSALAzine 500 MG Tbec  Commonly known as:  AZULFIDINE  Take 2 tablets (1,000 mg total) by mouth 2 (two) times daily.        CHANGE how you take these medications    diazePAM 2 MG tablet  Commonly known as:  VALIUM  Take 1 tablet (2 mg total) by mouth as needed for Anxiety.  What changed:  when to take this     predniSONE 20 MG tablet  Commonly known as:  DELTASONE  Take 3 tablets (60 mg total) by mouth once daily.  What changed:    · medication strength  · how much to take  · additional instructions  · Another medication with the same name was removed. Continue taking this medication, and follow the directions you see here.        CONTINUE taking these medications    acetaminophen 500 MG tablet  Commonly known as:  TYLENOL  Take 1,000 mg by mouth daily as needed for Pain.     albuterol 90 mcg/actuation inhaler  Commonly known as:  PROVENTIL/VENTOLIN HFA  Inhale 2 puffs into the lungs every 6 (six) hours as needed for Wheezing. Rescue     aspirin 81 MG EC tablet  Commonly known as:  ECOTRIN  Take 81 mg by mouth once daily.     carvedilol 12.5 MG tablet  Commonly known as:  COREG  Take 1 tablet (12.5 mg total) by mouth 2 (two) times daily with meals.     clopidogrel 75 mg tablet  Commonly known as:  PLAVIX  TAKE  1 TABLET DAILY     furosemide 20 MG tablet  Commonly known as:  LASIX  Take 1 tablet (20 mg total) by mouth once daily.     gabapentin 300 MG capsule  Commonly known as:  NEURONTIN  Take 1 capsule (300 mg total) by mouth every evening.     hydroxychloroquine 200 mg tablet  Commonly known as:  PLAQUENIL  Take 1 tablet (200 mg total) by mouth 2 (two) times daily.     linaclotide 145 mcg Cap capsule  Commonly known as:  LINZESS  Take 1 capsule (145 mcg total) by mouth once daily.     magnesium oxide 400 mg (241.3 mg magnesium) tablet  Commonly known as:  MAG-OX  Take 400 mg by mouth once daily.     montelukast 10 mg tablet  Commonly known as:  SINGULAIR  Take 1 tablet (10 mg total) by mouth every evening.     omeprazole 20 MG capsule  Commonly known as:  PRILOSEC  Take 1 capsule (20 mg total) by mouth once daily.     potassium chloride SA 10 MEQ tablet  Commonly known as:  K-DUR,KLOR-CON  Take 2 tablets (20 mEq total) by mouth once daily.     traMADol 50 mg tablet  Commonly known as:  ULTRAM  Take 1-2 tablets ( mg total) by mouth every 24 hours as needed for Pain.        STOP taking these medications    levoFLOXacin 750 MG tablet  Commonly known as:  LEVAQUIN            Indwelling Lines/Drains at time of discharge:   Lines/Drains/Airways          None          Time spent on the discharge of patient: >30 minutes  Patient was seen and examined on the date of discharge and determined to be suitable for discharge.    Dom Street MD  Department of Hospital Medicine  [facility]

## 2019-03-18 NOTE — PLAN OF CARE
Problem: Adult Inpatient Plan of Care  Goal: Plan of Care Review  Outcome: Ongoing (interventions implemented as appropriate)  Patient free from falls and injuries throughout shift.  AAO and VSS.  Patient denies chest pain and SOB.  Blood glucose managed through meals and insulin; .  Patient continues on prednisone 60mg daily. No acute events overnight. Patient resting well at this time.  Plan of care discussed with patient.  Patient verbalizes understanding.  Will continue to monitor.

## 2019-03-18 NOTE — PLAN OF CARE
[facility]    HOME HEALTH ORDERS  FACE TO FACE ENCOUNTER    Patient Name: Selma Alonzo Lux MD  YOB: 1935    PCP: Bhargav Hirsch MD   PCP Address: [address] / [place] [address]  PCP Phone Number: [phone]  PCP Fax: [phone]    Encounter Date: 03/18/2019    Admit to Home Health    Diagnoses:  Active Hospital Problems    Diagnosis  POA    *Interstitial lung disease due to connective tissue disease [M35.8, J84.89]  Yes    Cryptogenic organizing pneumonia [J84.116]  Yes     Priority: 1 - High     Clinically suspected due to recurring infiltrates on CXR in the setting of rheumatoid arthritis.  Work up for infection has been negative and the clinical picture is less consistent with acute infection.  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    · Slowly wean prednisone as tolerated.  · Now on sulfasalazine as per Dr. Rouse (Rheumatology).  It is probably reasonable to avoid biologics unless absolutely necessary given prior flare after infliximab.  · Repeat CXR from 12/25/2018 shows continued clearing.  · Consider repeat chest CT (non-contrast) once prednisone is at stable lowest dose possible.      CAP (community acquired pneumonia) [J18.9]  Yes     Priority: 2     KERMIT (acute kidney injury) [N17.9]  Yes     Priority: 3     Seropositive rheumatoid arthritis of multiple sites [M05.79]  Yes     Priority: 4      Dx 2012 with Dr No, then Kiera  HCQ since 2012  Prednisone 5 mg/d since 2012  Humira one dose April 2018 followed by ICU admission for pnemonia and resp failure        Pulmonary hypertension due to interstitial lung disease [I27.23, J84.9]  Yes    Long-term use of immunosuppressant medication [Z79.899]  Not Applicable     · Hydroxychloroquine and Sulfasalazine      Dystonia [G24.9]  Yes    Acute  hypoxemic respiratory failure [J96.01]  Yes    History of stroke [Z86.73]  Not Applicable    Essential hypertension [I10]  Yes      Resolved Hospital Problems   No resolved problems to display.       Future Appointments   Date Time Provider Department Center   3[facility]20/2019  1:00 PM [facility]   4/17/2019 10:45 AM Edgar Addison MD [facility] ENT [facility]   4/17/2019 11:30 AM AUDIOGRAM, AUDIO [facility] AUDIO [facility]   4/23/2019  1:30 PM Jessica Thacker MD [facility] HAND [facility]   5/6/2019  3:00 PM Baldomero Francis MD [facility] PULMSVC [facility]   5/16/2019 11:00 AM LAB, SAME DAY [facility] LAB [facility]   5/21/2019 11:30 AM Lonnie Rouse MD [facility] RHEUM [facility]     Follow-up Information     PR[facility] PULMONARY MEDICINE In 1 week.    Specialty:  Pulmonology  Contact information:  [id number]  [facility] [address] [phone]                   I have seen and examined this patient face to face today. My clinical findings that support the need for the home health skilled services and home bound status are the following:  Medical restrictions requiring assistance of another human to leave home due to  Dyspnea on exertion (SOB).    Allergies:Review of patient's allergies indicates:  No Known Allergies    Diet: regular diet    Activities: activity as tolerated    Nursing:   SN to complete comprehensive assessment including routine vital signs. Instruct on disease process and s/s of complications to report to MD. Review/verify medication list sent home with the patient at time of discharge  and instruct patient/caregiver as needed. Frequency may be adjusted depending on start of care date.    Notify MD if SBP > 160 or < 90; DBP > 90 or < 50; HR > 120 or < 50; Temp > 101    CONSULTS:    Physical Therapy to evaluate and treat. Evaluate for home safety and equipment needs; Establish/upgrade home exercise program. Perform / instruct on therapeutic exercises, gait training, transfer  training, and Range of Motion.  Occupational Therapy to evaluate and treat. Evaluate home environment for safety and equipment needs. Perform/Instruct on transfers, ADL training, ROM, and therapeutic exercises.    MISCELLANEOUS CARE:  N/A    WOUND CARE ORDERS  n/a      Medications: Review discharge medications with patient and family and provide education.      Current Discharge Medication List      CONTINUE these medications which have CHANGED    Details   predniSONE (DELTASONE) 20 MG tablet Take 3 tablets (60 mg total) by mouth once daily.  Qty: 90 tablet, Refills: 1      sulfaSALAzine (AZULFIDINE) 500 MG TbEC Take 2 tablets (1,000 mg total) by mouth 2 (two) times daily.  Qty: 120 tablet, Refills: 2    Associated Diagnoses: Seropositive rheumatoid arthritis of multiple sites         CONTINUE these medications which have NOT CHANGED    Details   acetaminophen (TYLENOL) 500 MG tablet Take 1,000 mg by mouth daily as needed for Pain.      albuterol (PROVENTIL/VENTOLIN HFA) 90 mcg/actuation inhaler Inhale 2 puffs into the lungs every 6 (six) hours as needed for Wheezing. Rescue  Qty: 1 Inhaler, Refills: 0    Associated Diagnoses: Pneumonia of both lungs due to infectious organism, unspecified part of lung      aspirin (ECOTRIN) 81 MG EC tablet Take 81 mg by mouth once daily.      carvedilol (COREG) 12.5 MG tablet Take 1 tablet (12.5 mg total) by mouth 2 (two) times daily with meals.  Qty: 180 tablet, Refills: 3    Associated Diagnoses: Hypertension, essential      clopidogrel (PLAVIX) 75 mg tablet TAKE 1 TABLET DAILY  Qty: 90 tablet, Refills: 1      diazePAM (VALIUM) 2 MG tablet Take 1 tablet (2 mg total) by mouth as needed for Anxiety.  Qty: 30 tablet, Refills: 5      furosemide (LASIX) 20 MG tablet Take 1 tablet (20 mg total) by mouth once daily.      gabapentin (NEURONTIN) 300 MG capsule Take 1 capsule (300 mg total) by mouth every evening.  Qty: 90 capsule, Refills: 2      hydroxychloroquine (PLAQUENIL) 200 mg  tablet Take 1 tablet (200 mg total) by mouth 2 (two) times daily.  Qty: 60 tablet, Refills: 2    Associated Diagnoses: Seropositive rheumatoid arthritis of multiple sites      linaclotide (LINZESS) 145 mcg Cap capsule Take 1 capsule (145 mcg total) by mouth once daily.  Qty: 30 capsule, Refills: 0      magnesium oxide (MAG-OX) 400 mg tablet Take 400 mg by mouth once daily.      montelukast (SINGULAIR) 10 mg tablet Take 1 tablet (10 mg total) by mouth every evening.  Qty: 90 tablet, Refills: 3      omeprazole (PRILOSEC) 20 MG capsule Take 1 capsule (20 mg total) by mouth once daily.  Qty: 90 capsule, Refills: 0      potassium chloride SA (K-DUR,KLOR-CON) 10 MEQ tablet Take 2 tablets (20 mEq total) by mouth once daily.  Qty: 60 tablet, Refills: 3    Associated Diagnoses: Seropositive rheumatoid arthritis of multiple sites      traMADol (ULTRAM) 50 mg tablet Take 1-2 tablets ( mg total) by mouth every 24 hours as needed for Pain.  Qty: 40 tablet, Refills: 0         STOP taking these medications       levoFLOXacin (LEVAQUIN) 750 MG tablet Comments:   Reason for Stopping:               I certify that this patient is confined to her home and needs intermittent skilled nursing care, physical therapy and occupational therapy.

## 2019-03-20 ENCOUNTER — TELEPHONE (OUTPATIENT)
Dept: INTERNAL MEDICINE | Facility: CLINIC | Age: 84
End: 2019-03-20

## 2019-03-20 NOTE — PHYSICIAN QUERY
PT Name: Selma Lux MD  MR #: [id number]    Physician Query Form - Respiratory Condition Clarification      CDS/: Gaye Boyd RN CCDS            Contact information: [email or web address]    This form is a permanent document in the medical record.    Query Date: March 20, 2019    By submitting this query, we are merely seeking further clarification of documentation. Please utilize your independent clinical judgment when addressing the question(s) below.    The Medical record contains the following   Indicators   Supporting Clinical Findings Location in Medical Record   x   SOB, BEJARANO, Wheezing, Productive Cough, Use of Accessory Muscles, etc. presents with 5 days of increasing SOB, minimally productive cough, and generalized weakness.   H&P   x   Acute/Chronic Illness interstitial lung disease/ cryptogenic organizing pneumonia secondary to seropositive RA, HTN, pulmonary HTN group 3, CVA    H&P   x   Radiology Findings Grossly stable chronic changes including platelike scarring in the right lung base and left upper lung zone. No large pneumothorax or definite new focal opacity. No acute osseous process seen. PA and   lateral views can be obtained.  CXR 3/13   x   Respiratory Distress or Failure Acute on chronic respiratory failure, poa    H&P   x   Hypoxia or Hypercapnia ILD is diagnosis- patient will need O2 for home use based on history of  with acute hypoxemia this admission.   Discharge summary   x   RR         ABGs         O2 sat RR=16-24  O2= on Room air and 2L/NC NN's      BiPAP/Intubation     x   Supplemental O2 2L/NC Nurses notes   x Home O2, Oxygen Dependence                                 Qualifies for home O2 PN 3/14      Treatment        Other       Respiratory failure can be acute, chronic or both. It is generally further specified as hypoxic, hypercapnic or both. Lastly, it is important to identify an etiology, if known or suspected.   References::   https://www.acphospitalist.org/archives/2013/10/coding.htm http://[employer]/acute-respiratory-failure-know     The clinical guidelines noted below are only system guidelines, and do not replace the providers clinical judgment.    Based on clinical indicators or lack of clinical indicators.  Please clarify the respiratory diagnosis.  Thank you.  [ X  ] Acute Respiratory Failure with Hypoxia - ABG pO2 < 60 mmHg or O2 sat of 88% on RA and respiratory symptoms documented   [   ] Acute and (on) Chronic Respiratory Failure with Hypoxia - pO2 >10 mmHg below baseline OR SpO2 < 91% on usual home O2 OR O2 > 2L/min over baseline home O2    [   ] Acute Respiratory Insufficiency - Generally describes less severe respiratory symptoms and measurements (pO2, SpO2, pH, and pCO2) NOT meeting criteria for respiratory failure     [   ] Hypoxia Only   [   ] Other Respiratory Diagnosis (please specify): _________________________________   [   ]  Clinically Undetermined       Please document in your progress notes daily for the duration of treatment until resolved and include in your discharge summary.

## 2019-03-20 NOTE — TELEPHONE ENCOUNTER
confirm if Dr will oversee the patient for home health services. Please call and advise. Thank you!!!    No

## 2019-03-20 NOTE — TELEPHONE ENCOUNTER
----- Message from Bess Hare sent at 3/20/2019  9:36 AM CDT -----  Contact: [facility]/[phone] ext [phone]      ----- Message -----  From: Lorelei Romo  Sent: 3/20/2019   9:26 AM  To: Rosa MONTES Staff    Type:Home Health(orders,updates,clarifications,etc)    Home Health Agency/Nurse: [facility]    Phone number: [phone] ext [phone]    Reason for call:    Comments: confirm if  will oversee the patient for home health services. Please call and advise. Thank you!!!

## 2019-03-31 ENCOUNTER — EXTERNAL CHRONIC CARE MANAGEMENT (OUTPATIENT)
Dept: PRIMARY CARE CLINIC | Facility: CLINIC | Age: 84
End: 2019-03-31
Payer: MEDICARE

## 2019-03-31 PROCEDURE — 99490 CHRNC CARE MGMT STAFF 1ST 20: CPT | Mod: PBBFAC | Performed by: FAMILY MEDICINE

## 2019-03-31 PROCEDURE — 99490 PR CHRONIC CARE MGMT, 1ST 20 MIN: ICD-10-PCS | Mod: S$PBB,,, | Performed by: FAMILY MEDICINE

## 2019-03-31 PROCEDURE — 99490 CHRNC CARE MGMT STAFF 1ST 20: CPT | Mod: S$PBB,,, | Performed by: FAMILY MEDICINE

## 2019-04-01 ENCOUNTER — OFFICE VISIT (OUTPATIENT)
Dept: PULMONOLOGY | Facility: CLINIC | Age: 84
End: 2019-04-01
Payer: MEDICARE

## 2019-04-01 ENCOUNTER — HOSPITAL ENCOUNTER (OUTPATIENT)
Dept: RADIOLOGY | Facility: HOSPITAL | Age: 84
Discharge: HOME | End: 2019-04-01
Attending: NURSE PRACTITIONER
Payer: MEDICARE

## 2019-04-01 VITALS
DIASTOLIC BLOOD PRESSURE: 70 MMHG | HEART RATE: 79 BPM | SYSTOLIC BLOOD PRESSURE: 120 MMHG | WEIGHT: 166.88 LBS | OXYGEN SATURATION: 93 % | HEIGHT: 64 IN | BODY MASS INDEX: 28.49 KG/M2

## 2019-04-01 DIAGNOSIS — R06.02 SHORTNESS OF BREATH: Primary | ICD-10-CM

## 2019-04-01 DIAGNOSIS — R06.02 SHORTNESS OF BREATH: ICD-10-CM

## 2019-04-01 DIAGNOSIS — J84.116 CRYPTOGENIC ORGANIZING PNEUMONIA: ICD-10-CM

## 2019-04-01 DIAGNOSIS — R05.9 COUGH: ICD-10-CM

## 2019-04-01 PROCEDURE — 99214 PR OFFICE/OUTPT VISIT, EST, LEVL IV, 30-39 MIN: ICD-10-PCS | Mod: S$PBB,,, | Performed by: INTERNAL MEDICINE

## 2019-04-01 PROCEDURE — 71046 X-RAY EXAM CHEST 2 VIEWS: CPT | Mod: 26,,, | Performed by: RADIOLOGY

## 2019-04-01 PROCEDURE — 71046 X-RAY EXAM CHEST 2 VIEWS: CPT | Mod: TC,FY

## 2019-04-01 PROCEDURE — 99999 PR PBB SHADOW E&M-EST. PATIENT-LVL III: CPT | Mod: PBBFAC,,, | Performed by: INTERNAL MEDICINE

## 2019-04-01 PROCEDURE — 99999 PR PBB SHADOW E&M-EST. PATIENT-LVL III: ICD-10-PCS | Mod: PBBFAC,,, | Performed by: INTERNAL MEDICINE

## 2019-04-01 PROCEDURE — 99214 OFFICE O/P EST MOD 30 MIN: CPT | Mod: S$PBB,,, | Performed by: INTERNAL MEDICINE

## 2019-04-01 PROCEDURE — 99213 OFFICE O/P EST LOW 20 MIN: CPT | Mod: PBBFAC,25 | Performed by: INTERNAL MEDICINE

## 2019-04-01 PROCEDURE — 71046 XR CHEST PA AND LATERAL: ICD-10-PCS | Mod: 26,,, | Performed by: RADIOLOGY

## 2019-04-01 NOTE — PROGRESS NOTES
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  She reports feeling overall improvement- she is not requiring supplemental O2 and has been able to get out to attend important events.  She is currently on 40 mg of prednisone since her discharge.  She has had some dyspnea with walking and pushing a heavy door at [employer].  She is working with PT and OT in an effort to build strength but feels that her stamina is not what it was previously.        Review of Systems   Constitutional: Positive for weakness. Negative for fever and chills.   HENT: Negative for postnasal drip, rhinorrhea and congestion.    Eyes: Negative.    Respiratory: Positive for shortness of breath and dyspnea on extertion.    Cardiovascular: Positive for leg swelling (trace, ~baseline for patient). Negative for chest pain and palpitations.   Endocrine: endocrine negative   Musculoskeletal: Positive for arthralgias.   Skin: Negative.    Gastrointestinal: Negative.    Neurological: Negative for dizziness and light-headedness.   Psychiatric/Behavioral: Negative for sleep disturbance.         Social History     Tobacco Use    Smoking status: Never Smoker    Smokeless tobacco: Never Used   Substance Use Topics    Alcohol use: No     Alcohol/week: 0.0 oz     Comment: very seldom        Review of patient's allergies indicates:  No Known Allergies  Past Medical History:   Diagnosis Date    Altered mental status     Anemia     Arthritis     Bilateral hand pain 3/14/2018    Branch retinal vein occlusion, left eye 2/20/2015    Chronic bilateral low back pain without sciatica 3/23/2017    Cognitive communication deficit 12/19/2017    Cystoid macular edema, left eye 2/20/2015    Cystoid macular edema, left eye 2/20/2015    Delirium superimposed on dementia     DJD (degenerative joint  disease) of cervical spine 5/15/2013    Fatty liver 8/26/2014    Goiter 4/9/2018    Hashimoto's disease     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    Hypertension     Hypertensive encephalopathy     IBS (irritable bowel syndrome) 6/21/2017    IGT (impaired glucose tolerance) 1/12/2016    Iron deficiency anemia secondary to inadequate dietary iron intake 6/24/2013    Joint pain     Keratoconjunctivitis sicca of both eyes not specified as Sjogren's 7/29/2016    Leiomyoma of uterus, unspecified 9/16/2014    Long QT interval 6/29/2016    Due to medication (plaquenil)     Macular edema 1/12/2015    Multinodular goiter 1/12/2016    Neuropathy 8/23/2017    Plaquenil causing adverse effect in therapeutic use 10/7/2016    Pneumococcal vaccine refused 8/17/2016    Pneumonia due to Streptococcus pneumoniae 4/5/2018    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/2015    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    s/p Right Total knee replacement 5/15/2013    Scoliosis of thoracic spine 5/15/2013    Small vessel disease, cerebrovascular 12/28/2017    Stroke     UTI (urinary tract infection) 12/29/2018    Vascular dementia 12/6/2017     Past Surgical History:   Procedure Laterality Date    BREAST CYST EXCISION      CATARACT EXTRACTION      COLONOSCOPY N/A 9/29/2015    Performed by [facility]EL[facility] (4TH FLR)    EGD (ESOPHAGOGASTRODUODENOSCOPY) N/A 3/11/2014    Performed by [facility]erico [facility]obar [facility] at [facility] (4TH FLR)    EGD (ESOPHAGOGASTRODUODENOSCOPY) N/A 11/5/2013    Performed by Fed[facility]ico [facility]obar MD at [facility] (4TH FLR)    ESOPHAGOGASTRODUODENOSCOPY (EGD) N/A 10/4/2017    Performed by Mg Morton [facility] at [facility] ENDO    EYE SURGERY      INJECTION-STEROID-EPIDURAL-TRANSFORAMINAL Right 9/20/2017    [facility] at [facility] PAIN MGT    JOINT REPLACEMENT      right knee    KNEE SURGERY Left 12/31/2013    TKR    left parotidectomy      mixed tumor    SALIVARY GLAND SURGERY       TONSILLECTOMY       Current Outpatient Medications on File Prior to Visit   Medication Sig    acetaminophen (TYLENOL) 500 MG tablet Take 1,000 mg by mouth daily as needed for Pain.    albuterol (PROVENTIL/VENTOLIN HFA) 90 mcg/actuation inhaler Inhale 2 puffs into the lungs every 6 (six) hours as needed for Wheezing. Rescue    aspirin (ECOTRIN) 81 MG EC tablet Take 81 mg by mouth once daily.    carvedilol (COREG) 12.5 MG tablet Take 1 tablet (12.5 mg total) by mouth 2 (two) times daily with meals.    clopidogrel (PLAVIX) 75 mg tablet TAKE 1 TABLET DAILY    diazePAM (VALIUM) 2 MG tablet Take 1 tablet (2 mg total) by mouth as needed for Anxiety. (Patient taking differently: Take 2 mg by mouth every evening. )    furosemide (LASIX) 20 MG tablet Take 1 tablet (20 mg total) by mouth once daily.    gabapentin (NEURONTIN) 300 MG capsule Take 1 capsule (300 mg total) by mouth every evening.    hydroxychloroquine (PLAQUENIL) 200 mg tablet Take 1 tablet (200 mg total) by mouth 2 (two) times daily.    linaclotide (LINZESS) 145 mcg Cap capsule Take 1 capsule (145 mcg total) by mouth once daily.    magnesium oxide (MAG-OX) 400 mg tablet Take 400 mg by mouth once daily.    montelukast (SINGULAIR) 10 mg tablet Take 1 tablet (10 mg total) by mouth every evening.    omeprazole (PRILOSEC) 20 MG capsule Take 1 capsule (20 mg total) by mouth once daily.    potassium chloride SA (K-DUR,KLOR-CON) 10 MEQ tablet Take 2 tablets (20 mEq total) by mouth once daily.    predniSONE (DELTASONE) 20 MG tablet Take 3 tablets (60 mg total) by mouth once daily. (Patient taking differently: Take 40 mg by mouth once daily. )    sulfaSALAzine (AZULFIDINE) 500 MG TbEC Take 2 tablets (1,000 mg total) by mouth 2 (two) times daily.    traMADol (ULTRAM) 50 mg tablet Take 1-2 tablets ( mg total) by mouth every 24 hours as needed for Pain.     Current Facility-Administered Medications on File Prior to Visit   Medication     onabotulinumtoxina injection 200 Units       Objective:      Vitals:    04/01/19 1337   BP: 120/70   Pulse: 79     Physical Exam   Constitutional: She is oriented to person, place, and time. She appears well-developed and well-nourished.   HENT:   Head: Normocephalic.   Neck: Normal range of motion. Neck supple.   Tenderness near left angle of the mandible.  Patient reports this has been ongoing and she has discussed this with her neurologist as it may be torticollis   Cardiovascular: Normal rate and regular rhythm.   Pulmonary/Chest: Normal expansion and effort normal. Rhonchi: posterior R lower lung    Abdominal: Soft.   Musculoskeletal: Normal range of motion. Edema: trace BLE.   Lymphadenopathy:     She has no cervical adenopathy.   Neurological: She is alert and oriented to person, place, and time.   Patient in wheelchair for clinic visit, ambulates with walker at home.   Skin: Skin is warm and dry.   Psychiatric: She has a normal mood and affect. Her behavior is normal.   Vitals reviewed.    Personal Diagnostic Review    CXR personally reviewed      Assessment:     Problem List Items Addressed This Visit        Pulmonary    Cryptogenic organizing pneumonia    Overview     Clinically suspected due to recurring infiltrates on CXR in the setting of rheumatoid arthritis.  Work up for infection has been negative and the clinical picture is less consistent with acute infection.  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    · Slowly wean prednisone as tolerated.  · Now on sulfasalazine as per Dr. Rouse (Rheumatology).  It is probably reasonable to avoid biologics unless absolutely necessary given prior flare after infliximab.  · Repeat CXR from 12/25/2018 shows continued clearing.  · Consider repeat chest CT (non-contrast) once prednisone is at stable lowest dose possible.          Current Assessment & Plan     Patient is currently on 40 mg prednisone QD.  Today's CXR personally reviewed and does show some improvement, albeit slow.  As patient has had significant worsening of symptoms when her prednisone is tapered down to ~10 mg QD, will recommend that we decrease her daily dose to 30 mg of prednisone in about 2 weeks.  Repeat CXR in about 1 month and we will base clinic follow up on imaging results and patient condition.           Other Visit Diagnoses     Shortness of breath    -  Primary    Relevant Orders    X-Ray Chest PA And Lateral

## 2019-04-02 NOTE — ASSESSMENT & PLAN NOTE
Patient is currently on 40 mg prednisone QD.  Today's CXR personally reviewed and does show some improvement, albeit slow.  As patient has had significant worsening of symptoms when her prednisone is tapered down to ~10 mg QD, will recommend that we decrease her daily dose to 30 mg of prednisone in about 2 weeks.  Repeat CXR in about 1 month and we will base clinic follow up on imaging results and patient condition.

## 2019-04-05 ENCOUNTER — TELEPHONE (OUTPATIENT)
Dept: PULMONOLOGY | Facility: CLINIC | Age: 84
End: 2019-04-05

## 2019-04-05 ENCOUNTER — PATIENT MESSAGE (OUTPATIENT)
Dept: PULMONOLOGY | Facility: CLINIC | Age: 84
End: 2019-04-05

## 2019-04-05 NOTE — TELEPHONE ENCOUNTER
Spoke with patient daughter Susu, informed her that I have received her message and will forward it to Dr. Francis to review/advise and someone from our office would be in contact with her once advised. Susu will also be provided with our office fax number through Dr. Bacon [facility].

## 2019-04-05 NOTE — TELEPHONE ENCOUNTER
----- Message from Starr Lagos sent at 4/5/2019  3:23 PM CDT -----  Contact:    Susu      [phone]  [facility] Advice  /   Patient Daughter     Reason for call:    Discuss  After clinic visit follow up with the          Communication Preference:   Phone      Additional Information:

## 2019-04-09 NOTE — PROGRESS NOTES
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    Chief Complaint: Neck Pain and Shoulder Pain (bilateral )    Molly Jose J is an 84 yo female here for follow up of her neck pain from 7-8 years that worsened in November.  She was last seen by me on 11/1/2018 and she was doing better and moving better.    She had trigger point in SCM on 1/17/2018. She was having some shoulder pain.  Today she is having neck and shoulder pain.  The neck pin is constant.  The pain is with moving her arms.  She does have pain turning to the side.  She was going to have botox.  She missed her last botox.  She feels like it helps some.  The pain is more on the left side.  She has been doing home health PT for ADL, and mobility.  The pain is all words.  The pain can be a different description depending on what she is doing.  The pain is 5/10 now, worst 8/10 she does not know.  She does hold her head down a lot when she sits, best 2/10 with a heating pad.  She was taken off the baclofen.  She is still taking gabapentin.  She was taken off baclofen when released from the hospital.  She does not know why.  She is taking valium.      X-ray hips 3/11/2019  No evidence of acute fracture, dislocation or bone destruction.  Moderate degenerative changes of the hip joints bilaterally left greater than right.  Severe degenerative changes of the lower lumbar spine.  Calcifications in the pelvis likely calcified fibroids.    Impression      Moderate degenerative changes of the hips.  Severe degenerative changes of the visualized portion of the lumbar spine.  No acute bony abnormalities.      MRI cervical 3/2017  There is mild anterolisthesis C2 on C3 and mild retrolisthesis of C3 on C4.  There is also mild retrolisthesis of C6 on C7.  The spondylolisthesis is likely secondary to ligamentous laxity.  There is straightening of normal cervical lordosis. Vertebral body heights are well maintained without evidence of fracture or  marrow signal abnormality suspicious for an infiltrative process.  There is loss of intervertebral disc space height at C5-6 and C6-7 Visualized posterior fossa, cervical cord, and upper thoracic cord demonstrate normal signal. Surrounding soft tissue structures demonstrate no significant abnormalities.      C2-3:  Posterior disc osteophyte complex and bilateral facet arthropathy.  Effacement of the anterior thecal sac, without flattening of the spinal cord.  No significant neural foraminal narrowing.     C3-4:  Posterior disc osteophyte complex and bilateral facet arthropathy, resulting in effacement of the anterior thecal sac, without flattening of the spinal cord.  There is moderate left-sided neuroforaminal narrowing.       C4-5:  Posterior disc osteophyte complex and bilateral facet arthropathy, resulting in effacement of the anterior thecal sac, without flattening of the spinal cord.  There is no significant neural foraminal narrowing.     C5-6:  Posterior disc osteophyte complex and bilateral facet arthropathy, resulting in effacement of the anterior thecal sac, without flattening of spinal cord.  No significant neural foraminal narrowing.     C6-7:  Posterior disc osteophyte complex and bilateral facet arthropathy, resulting in effacement of the anterior thecal sac, without flattening of the spinal cord.  No significant neural foraminal narrowing.     C7-T1:  No significant spinal canal stenosis.  No significant neural foraminal narrowing.  Impression         There is multilevel degenerative changes of the cervical spine, resulting in mild spinal canal stenosis and neuroforaminal narrowing as detailed above.      X-ray cervical  AP, neutral lateral, flexion lateral, and extension lateral radiographs of the cervical spine were obtained.  Note that the cervicothoracic junction is not optimally visualized.  There is 2 mm anterolisthesis of C2 on C3 which increases to 3 mm on the flexion radiographs.  The  remainder of the posterior vertebral alignment is satisfactory.  Vertebral body heights are well-maintained.  There are advanced degenerative changes identified throughout the cervical spine with disc space narrowing and anterior and posterior osteophyte formation.  There is solid bony fusion of the C5 and C6 vertebral bodies.  There is mild facet arthropathy noted throughout the cervical spine.  Atherosclerotic calcification is identified in the region of the carotid arteries bilaterally.  Impression         2 mm anterolisthesis of C2 on C3 which appears to be degenerative in etiology.  This increases to 3 mm on the flexion radiograph of the cervical spine.  Cervical spondylosis.  Atherosclerosis.    X-ray left shoulder 2015  Left shoulder: Significant acromiohumeral interval narrowing, this can be associated with a rotator cuff tear.  The humeral head demonstrate normal contour.  No osseous lesions, sclerosis or significant osteophyte formation.  The acromioclavicular joint   appears within normal limits.  The soft tissues appear normal.    Past Medical History:  No date: Anemia  No date: Arthritis  No date: Hashimoto's disease  No date: Hypertension  1/12/2016: IGT (impaired glucose tolerance)  No date: Joint pain  1/12/2016: Multinodular goiter    Past Surgical History:  No date: CATARACT EXTRACTION  9/29/2015: COLONOSCOPY N/A      Comment: Procedure: COLONOSCOPY;  Surgeon: FIDELINA Sanchez MD;  Location: [facility] ([address] [place] [address]);                 Service: Endoscopy;  Laterality: N/A;  No date: JOINT REPLACEMENT      Comment: right knee  12/31/2013: KNEE SURGERY Left      Comment: TKR  No date: left parotidectomy      Comment: mixed tumor  No date: SALIVARY GLAND SURGERY  No date: TONSILLECTOMY    Review of patient's family history indicates:    Hypertension                   Mother                    Prostate cancer                Father                      Comment: prostate cancer    Breast cancer                   Maternal Grandmother      Lupus                          Neg Hx                    Psoriasis                      Neg Hx                    Melanoma                       Neg Hx                    Colon cancer                   Neg Hx                      Social History    Marital status:              Spouse name:                       Years of education:                 Number of children:               Social History Main Topics    Smoking status: Never Smoker                                                                Smokeless tobacco: Never Used                        Alcohol use: No                 Comment: very seldom     Drug use: No              Sexual activity: No                      Comment: ,  age 52,         Current Outpatient Prescriptions:  amlodipine (NORVASC) 5 MG tablet, TAKE 1 TABLET DAILY, Disp: 90 tablet, Rfl: 2  baclofen (LIORESAL) 10 MG tablet, Take 1 tablet (10 mg total) by mouth 3 (three) times daily., Disp: 270 tablet, Rfl: 2  clopidogrel (PLAVIX) 75 mg tablet, Take 1 tablet (75 mg total) by mouth once daily., Disp: 90 tablet, Rfl: 1  deutetrabenazine (AUSTEDO) 9 mg Tab, Take 2 tablets by mouth 2 (two) times daily. Final titrating dose - needs initially titration pack from company, Disp: 120 tablet, Rfl: 11  diazePAM (VALIUM) 2 MG tablet, Take 1 tablet (2 mg total) by mouth every evening., Disp: 30 tablet, Rfl: 3  ferrous sulfate 325 (65 FE) MG EC tablet, Take 325 mg by mouth once daily. , Disp: , Rfl:   gabapentin (NEURONTIN) 300 MG capsule, Take 1-2 capsules (300-600 mg total) by mouth 3 (three) times daily., Disp: 540 capsule, Rfl: 1  hydrocodone-acetaminophen 5-325mg (NORCO) 5-325 mg per tablet, Take 1 tablet by mouth every 24 hours as needed for Pain., Disp: 30 tablet, Rfl: 0  hydroxychloroquine (PLAQUENIL) 200 mg tablet, TAKE 2 TABLETS ONCE DAILY, Disp: 180 tablet, Rfl: 1  linaclotide (LINZESS) 145 mcg Cap capsule, Take 1 capsule (145 mcg total) by  mouth once daily., Disp: 30 capsule, Rfl: 0  memantine (NAMENDA XR) 7-14-21-28 mg C24k, Follow titration instructions 7 mg x1. 14 mg x1 week. 21 mg x1 week. 28 mg x1 week., Disp: 1 each, Rfl: 0  memantine 28 mg CSpX, Take 1 capsule (28 mg total) by mouth once daily. START after titration pack completed., Disp: 30 capsule, Rfl: 3  montelukast (SINGULAIR) 10 mg tablet, TAKE 1 TABLET EVERY EVENING, Disp: 90 tablet, Rfl: 2  omega 3-dha-epa-fish oil 250-350-1,000 mg Cap, Take 1 capsule by mouth 2 (two) times daily., Disp: 180 capsule, Rfl: 3  predniSONE (DELTASONE) 5 MG tablet, TAKE 2 TABLETS ONCE DAILY, Disp: 180 tablet, Rfl: 0  sertraline (ZOLOFT) 25 MG tablet, Take 1 tablet (25 mg total) by mouth once daily., Disp: 90 tablet, Rfl: 1  thiamine 100 MG tablet, Take 1 tablet (100 mg total) by mouth once daily., Disp: , Rfl:     No current facility-administered medications for this visit.       Review of patient's allergies indicates:  No Known Allergies        Review of Systems   Constitution: Negative for weight gain and weight loss.   Cardiovascular: Positive for dyspnea on exertion. Negative for chest pain.   Respiratory: Negative for shortness of breath.    Musculoskeletal: Positive for joint pain and neck pain. Negative for joint swelling.   Gastrointestinal: Negative for abdominal pain, bowel incontinence, nausea and vomiting.   Genitourinary: Negative for bladder incontinence and urgency.   Neurological: Positive for numbness and paresthesias.         Objective:        General: Selma is well-developed, well-nourished, appears stated age, in no acute distress, alert and oriented to time, place and person.     General    Vitals reviewed.  Constitutional: She is oriented to person, place, and time. She appears well-developed and well-nourished.   HENT:   Head: Normocephalic and atraumatic.   Pulmonary/Chest: Effort normal.   Neurological: She is alert and oriented to person, place, and time.   Psychiatric: She has a  normal mood and affect. Her behavior is normal. Judgment and thought content normal.     General Musculoskeletal Exam   Gait: abnormal (on a walker)     Back (L-Spine & T-Spine) / Neck (C-Spine) Exam     Tenderness   The patient is tender to palpation of the left trapezial and left SCM. Left paramedian tenderness of the Upper C-Spine, Occ and Lower C-Spine.     Neck (C-Spine) Range of Motion   Flexion:     > 40  Extension: > 40Right Lateral Bend: 15 Left Lateral Bend: 15 Right Rotation: 30 Left Rotation: 30     Spinal Sensation   Right Side Sensation  C-Spine Level: normal   L-Spine Level: normal  S-Spine Level: normal  Left Side Sensation  C-Spine Level: normal  L-Spine Level: normal  S-Spine Level: normal    Other She has no scoliosis .  Spinal Kyphosis:  Absent      Left Shoulder Exam     Range of Motion   Active abduction: 100     Tests & Signs   Rotator Cuff Painful Arc/Range: moderate      Muscle Strength   Right Upper Extremity   Biceps: 5/5/5   Deltoid:  5/5  Triceps:  5/5  Wrist extension: 5/5/5   : 4/5/5   Finger Flexors:  5/5  Left Upper Extremity  Biceps: 5/5/5   Deltoid:  5/5  Triceps:  5/5  Wrist extension: 5/5/5   :  4/5/5   Finger Flexors:  5/5  Right Lower Extremity   Hip Flexion: 5/5   Quadriceps:  5/5   Anterior tibial:  5/5/5  Left Lower Extremity   Hip Flexion: 5/5   Quadriceps:  5/5   Anterior tibial:  5/5/5     Reflexes     Left Side  Biceps:  2+  Triceps:  2+  Brachioradialis:  2+  Quadriceps:  2+  Achilles:  2+  Left Davis's Sign:  Absent  Babinski Sign:  absent    Right Side   Biceps:  2+  Triceps:  2+  Brachioradialis:  2+  Quadriceps:  2+  Achilles:  2+  Right Davis's Sign:  absent  Babinski Sign:  absent    Vascular Exam     Right Pulses        Carotid:                  2+    Left Pulses        Carotid:                  2+              Assessment:       1. Neck pain    2. Muscle spasm    3. Hemichorea    4. Spondylosis of cervical region without myelopathy or radiculopathy     5. Isolated cervical dystonia    6. Chronic left shoulder pain           Plan:       Orders Placed This Encounter    baclofen (LIORESAL) 10 MG tablet     1.  She had botox for her neck and that really helped.  She is trying to get scheduled for botox again.  She missed the appointment due to admit  2. Continue HH, we discussed going to outpatient PT for cervical retractions, scapula stabilization, and shoulder ROM, and neck ROM.  They will let us know when done with HH  3.  Baclofen 10mg po TID, she will restart  4.  gabapentin to 300 mg po TID, was changes to gabapentin 300 at night after hospital  5.  She is not having as much hand and arm shaking on the left.  She feels like the chorea is better.  She is not interested in doing anything for her shoulder now.    6.  We discussed pain management she is not interested  7.   RTC 3 months      Follow-up: Follow up in about 3 months (around 7/10/2019). If there are any questions prior to this, the patient was instructed to contact the office.

## 2019-04-10 ENCOUNTER — OFFICE VISIT (OUTPATIENT)
Dept: SPINE | Facility: CLINIC | Age: 84
End: 2019-04-10
Attending: PHYSICAL MEDICINE & REHABILITATION
Payer: MEDICARE

## 2019-04-10 VITALS
DIASTOLIC BLOOD PRESSURE: 62 MMHG | TEMPERATURE: 99 F | HEIGHT: 64 IN | BODY MASS INDEX: 28.56 KG/M2 | HEART RATE: 86 BPM | SYSTOLIC BLOOD PRESSURE: 144 MMHG | WEIGHT: 167.31 LBS

## 2019-04-10 DIAGNOSIS — M62.838 MUSCLE SPASM: ICD-10-CM

## 2019-04-10 DIAGNOSIS — G24.3 ISOLATED CERVICAL DYSTONIA: ICD-10-CM

## 2019-04-10 DIAGNOSIS — M25.512 CHRONIC LEFT SHOULDER PAIN: ICD-10-CM

## 2019-04-10 DIAGNOSIS — M54.2 NECK PAIN: Primary | ICD-10-CM

## 2019-04-10 DIAGNOSIS — G25.5 HEMICHOREA: ICD-10-CM

## 2019-04-10 DIAGNOSIS — M47.812 SPONDYLOSIS OF CERVICAL REGION WITHOUT MYELOPATHY OR RADICULOPATHY: ICD-10-CM

## 2019-04-10 DIAGNOSIS — G89.29 CHRONIC LEFT SHOULDER PAIN: ICD-10-CM

## 2019-04-10 PROCEDURE — 99214 PR OFFICE/OUTPT VISIT, EST, LEVL IV, 30-39 MIN: ICD-10-PCS | Mod: S$PBB,,, | Performed by: PHYSICAL MEDICINE & REHABILITATION

## 2019-04-10 PROCEDURE — 99999 PR PBB SHADOW E&M-EST. PATIENT-LVL III: ICD-10-PCS | Mod: PBBFAC,,, | Performed by: PHYSICAL MEDICINE & REHABILITATION

## 2019-04-10 PROCEDURE — 99999 PR PBB SHADOW E&M-EST. PATIENT-LVL III: CPT | Mod: PBBFAC,,, | Performed by: PHYSICAL MEDICINE & REHABILITATION

## 2019-04-10 PROCEDURE — 99214 OFFICE O/P EST MOD 30 MIN: CPT | Mod: S$PBB,,, | Performed by: PHYSICAL MEDICINE & REHABILITATION

## 2019-04-10 PROCEDURE — 99213 OFFICE O/P EST LOW 20 MIN: CPT | Mod: PBBFAC | Performed by: PHYSICAL MEDICINE & REHABILITATION

## 2019-04-10 RX ORDER — BACLOFEN 10 MG/1
10 TABLET ORAL 3 TIMES DAILY
Qty: 90 TABLET | Refills: 2 | Status: ON HOLD | OUTPATIENT
Start: 2019-04-10 | End: 2019-08-30 | Stop reason: HOSPADM

## 2019-04-12 ENCOUNTER — TELEPHONE (OUTPATIENT)
Dept: NEUROLOGY | Facility: CLINIC | Age: 84
End: 2019-04-12

## 2019-04-12 NOTE — TELEPHONE ENCOUNTER
Returned call to patient daughter Susu, appointment booked for 5/8/19 at 3 Pm for Botox with Dr. Giang.

## 2019-04-14 ENCOUNTER — HOSPITAL ENCOUNTER (EMERGENCY)
Facility: HOSPITAL | Age: 84
Discharge: HOME | End: 2019-04-14
Attending: EMERGENCY MEDICINE
Payer: MEDICARE

## 2019-04-14 ENCOUNTER — HOSPITAL ENCOUNTER (OUTPATIENT)
Dept: RADIOLOGY | Facility: HOSPITAL | Age: 84
Discharge: HOME | End: 2019-04-14
Attending: EMERGENCY MEDICINE
Payer: MEDICARE

## 2019-04-14 DIAGNOSIS — R53.1 WEAKNESS: ICD-10-CM

## 2019-04-14 DIAGNOSIS — I10 HTN (HYPERTENSION): ICD-10-CM

## 2019-04-14 LAB
ALBUMIN SERPL BCP-MCNC: 3.7 G/DL (ref 3.5–5.2)
ALP SERPL-CCNC: 52 U/L (ref 55–135)
ALT SERPL W/O P-5'-P-CCNC: 9 U/L (ref 10–44)
ANION GAP SERPL CALC-SCNC: 9 MMOL/L (ref 8–16)
APTT BLDCRRT: 21.8 SEC (ref 21–32)
AST SERPL-CCNC: 12 U/L (ref 10–40)
BASOPHILS # BLD AUTO: 0.02 K/UL (ref 0–0.2)
BASOPHILS NFR BLD: 0.2 % (ref 0–1.9)
BILIRUB SERPL-MCNC: 0.5 MG/DL (ref 0.1–1)
BILIRUB UR QL STRIP: NEGATIVE
BNP SERPL-MCNC: 142 PG/ML (ref 0–99)
BUN SERPL-MCNC: 16 MG/DL (ref 8–23)
CALCIUM SERPL-MCNC: 9.6 MG/DL (ref 8.7–10.5)
CHLORIDE SERPL-SCNC: 93 MMOL/L (ref 95–110)
CLARITY UR REFRACT.AUTO: CLEAR
CO2 SERPL-SCNC: 25 MMOL/L (ref 23–29)
COLOR UR AUTO: ABNORMAL
CREAT SERPL-MCNC: 1.1 MG/DL (ref 0.5–1.4)
DIFFERENTIAL METHOD: ABNORMAL
EOSINOPHIL # BLD AUTO: 0.1 K/UL (ref 0–0.5)
EOSINOPHIL NFR BLD: 0.9 % (ref 0–8)
ERYTHROCYTE [DISTWIDTH] IN BLOOD BY AUTOMATED COUNT: 14.6 % (ref 11.5–14.5)
EST. GFR  (AFRICAN AMERICAN): 54.4 ML/MIN/1.73 M^2
EST. GFR  (NON AFRICAN AMERICAN): 47.2 ML/MIN/1.73 M^2
GLUCOSE SERPL-MCNC: 115 MG/DL (ref 70–110)
GLUCOSE UR QL STRIP: NEGATIVE
HCT VFR BLD AUTO: 42.2 % (ref 37–48.5)
HGB BLD-MCNC: 12.9 G/DL (ref 12–16)
HGB UR QL STRIP: NEGATIVE
IMM GRANULOCYTES # BLD AUTO: 0.09 K/UL (ref 0–0.04)
IMM GRANULOCYTES NFR BLD AUTO: 0.8 % (ref 0–0.5)
INR PPP: 1 (ref 0.8–1.2)
KETONES UR QL STRIP: NEGATIVE
LEUKOCYTE ESTERASE UR QL STRIP: NEGATIVE
LYMPHOCYTES # BLD AUTO: 2.1 K/UL (ref 1–4.8)
LYMPHOCYTES NFR BLD: 19 % (ref 18–48)
MCH RBC QN AUTO: 26.7 PG (ref 27–31)
MCHC RBC AUTO-ENTMCNC: 30.6 G/DL (ref 32–36)
MCV RBC AUTO: 87 FL (ref 82–98)
MONOCYTES # BLD AUTO: 1.5 K/UL (ref 0.3–1)
MONOCYTES NFR BLD: 13.9 % (ref 4–15)
NEUTROPHILS # BLD AUTO: 7.2 K/UL (ref 1.8–7.7)
NEUTROPHILS NFR BLD: 65.2 % (ref 38–73)
NITRITE UR QL STRIP: NEGATIVE
NRBC BLD-RTO: 0 /100 WBC
PH UR STRIP: 5 [PH] (ref 5–8)
PLATELET # BLD AUTO: 150 K/UL (ref 150–350)
PMV BLD AUTO: 9.9 FL (ref 9.2–12.9)
POTASSIUM SERPL-SCNC: 4.1 MMOL/L (ref 3.5–5.1)
PROT SERPL-MCNC: 6.8 G/DL (ref 6–8.4)
PROT UR QL STRIP: NEGATIVE
PROTHROMBIN TIME: 10.8 SEC (ref 9–12.5)
RBC # BLD AUTO: 4.83 M/UL (ref 4–5.4)
SODIUM SERPL-SCNC: 127 MMOL/L (ref 136–145)
SP GR UR STRIP: <1.005 (ref 1–1.03)
TROPONIN I SERPL DL<=0.01 NG/ML-MCNC: 0.01 NG/ML (ref 0–0.03)
URN SPEC COLLECT METH UR: ABNORMAL
WBC # BLD AUTO: 11.04 K/UL (ref 3.9–12.7)

## 2019-04-14 PROCEDURE — 25000003 PHARM REV CODE 250

## 2019-04-14 PROCEDURE — 99284 EMERGENCY DEPT VISIT MOD MDM: CPT

## 2019-04-14 PROCEDURE — 93010 EKG 12-LEAD: ICD-10-PCS | Mod: ,,, | Performed by: INTERNAL MEDICINE

## 2019-04-14 PROCEDURE — 71046 X-RAY EXAM CHEST 2 VIEWS: CPT | Mod: 26,,, | Performed by: RADIOLOGY

## 2019-04-14 PROCEDURE — 71046 X-RAY EXAM CHEST 2 VIEWS: CPT | Mod: TC,FY

## 2019-04-14 PROCEDURE — 99285 PR EMERGENCY DEPT VISIT,LEVEL V: ICD-10-PCS | Mod: ,,, | Performed by: EMERGENCY MEDICINE

## 2019-04-14 PROCEDURE — 93005 ELECTROCARDIOGRAM TRACING: CPT | Performed by: INTERNAL MEDICINE

## 2019-04-14 PROCEDURE — 99285 EMERGENCY DEPT VISIT HI MDM: CPT | Mod: ,,, | Performed by: EMERGENCY MEDICINE

## 2019-04-14 PROCEDURE — 71046 XR CHEST PA AND LATERAL: ICD-10-PCS | Mod: 26,,, | Performed by: RADIOLOGY

## 2019-04-14 PROCEDURE — 80053 COMPREHEN METABOLIC PANEL: CPT

## 2019-04-14 PROCEDURE — 93010 ELECTROCARDIOGRAM REPORT: CPT | Mod: ,,, | Performed by: INTERNAL MEDICINE

## 2019-04-14 PROCEDURE — 81003 URINALYSIS AUTO W/O SCOPE: CPT

## 2019-04-14 PROCEDURE — 83880 ASSAY OF NATRIURETIC PEPTIDE: CPT

## 2019-04-14 PROCEDURE — 85610 PROTHROMBIN TIME: CPT

## 2019-04-14 PROCEDURE — 85025 COMPLETE CBC W/AUTO DIFF WBC: CPT

## 2019-04-14 PROCEDURE — 85730 THROMBOPLASTIN TIME PARTIAL: CPT

## 2019-04-14 PROCEDURE — 84484 ASSAY OF TROPONIN QUANT: CPT

## 2019-04-14 RX ORDER — AMLODIPINE BESYLATE 5 MG/1
TABLET ORAL
Status: COMPLETED
Start: 2019-04-14 | End: 2019-04-14

## 2019-04-14 RX ADMIN — AMLODIPINE BESYLATE 5 MG: 5 TABLET ORAL at 04:04

## 2019-04-17 ENCOUNTER — OFFICE VISIT (OUTPATIENT)
Dept: OTOLARYNGOLOGY | Facility: CLINIC | Age: 84
End: 2019-04-17
Payer: MEDICARE

## 2019-04-17 ENCOUNTER — CLINICAL SUPPORT (OUTPATIENT)
Dept: AUDIOLOGY | Facility: CLINIC | Age: 84
End: 2019-04-17
Payer: MEDICARE

## 2019-04-17 DIAGNOSIS — H61.21 IMPACTED CERUMEN OF RIGHT EAR: Primary | ICD-10-CM

## 2019-04-17 DIAGNOSIS — H90.41 SENSORINEURAL HEARING LOSS (SNHL) OF RIGHT EAR WITH UNRESTRICTED HEARING OF LEFT EAR: Primary | ICD-10-CM

## 2019-04-17 DIAGNOSIS — H90.3 BILATERAL HIGH FREQUENCY SENSORINEURAL HEARING LOSS: ICD-10-CM

## 2019-04-17 PROCEDURE — 99212 OFFICE O/P EST SF 10 MIN: CPT | Mod: PBBFAC,27 | Performed by: OTOLARYNGOLOGY

## 2019-04-17 PROCEDURE — 69210 REMOVE IMPACTED EAR WAX UNI: CPT | Mod: PBBFAC | Performed by: OTOLARYNGOLOGY

## 2019-04-17 PROCEDURE — 99999 PR PBB SHADOW E&M-EST. PATIENT-LVL I: ICD-10-PCS | Mod: PBBFAC,,,

## 2019-04-17 PROCEDURE — 99211 OFF/OP EST MAY X REQ PHY/QHP: CPT | Mod: PBBFAC

## 2019-04-17 PROCEDURE — 99999 PR PBB SHADOW E&M-EST. PATIENT-LVL I: CPT | Mod: PBBFAC,,,

## 2019-04-17 PROCEDURE — 99999 PR PBB SHADOW E&M-EST. PATIENT-LVL II: ICD-10-PCS | Mod: PBBFAC,,, | Performed by: OTOLARYNGOLOGY

## 2019-04-17 PROCEDURE — 99213 PR OFFICE/OUTPT VISIT, EST, LEVL III, 20-29 MIN: ICD-10-PCS | Mod: 25,S$PBB,, | Performed by: OTOLARYNGOLOGY

## 2019-04-17 PROCEDURE — 69210 EAR CERUMEN REMOVAL: ICD-10-PCS | Mod: S$PBB,,, | Performed by: OTOLARYNGOLOGY

## 2019-04-17 PROCEDURE — 99999 PR PBB SHADOW E&M-EST. PATIENT-LVL II: CPT | Mod: PBBFAC,,, | Performed by: OTOLARYNGOLOGY

## 2019-04-17 PROCEDURE — 99213 OFFICE O/P EST LOW 20 MIN: CPT | Mod: 25,S$PBB,, | Performed by: OTOLARYNGOLOGY

## 2019-04-17 PROCEDURE — 92557 COMPREHENSIVE HEARING TEST: CPT | Mod: PBBFAC | Performed by: AUDIOLOGIST

## 2019-04-17 RX ORDER — AMLODIPINE BESYLATE 5 MG/1
TABLET ORAL
COMMUNITY
Start: 2019-04-16 | End: 2019-05-08 | Stop reason: SDUPTHER

## 2019-04-17 RX ORDER — AMLODIPINE BESYLATE 5 MG/1
5 TABLET ORAL
Status: COMPLETED | OUTPATIENT
Start: 2019-04-14 | End: 2019-04-14

## 2019-04-17 NOTE — PROGRESS NOTES
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    Chief Complaint: Hearing Loss    Hearing Loss:   Chronicity:  Chronic  Onset:  More than 1 month ago  Progression since onset:  Unchanged  Severity:  Mild  Hearing loss characteristics: children are concerned she may have hearing loss.No dizziness, no fever, no headaches and no tinnitus.  Treatments tried:  NothingNo dizziness.    Review of Systems   Constitutional: Negative for chills, fever and unexpected weight change.   HENT: Positive for hearing loss. Negative for sore throat, tinnitus and trouble swallowing.    Eyes: Negative for pain and visual disturbance.   Respiratory: Negative for apnea and shortness of breath.    Cardiovascular: Negative for chest pain and palpitations.   Gastrointestinal: Negative for abdominal pain and nausea.   Endocrine: Negative for cold intolerance and heat intolerance.   Musculoskeletal: Negative for joint swelling and neck stiffness.   Skin: Negative for color change and rash.   Neurological: Negative for dizziness, facial asymmetry and headaches.   Hematological: Negative for adenopathy. Does not bruise/bleed easily.   Psychiatric/Behavioral: Negative for agitation. The patient is not nervous/anxious.        Objective:      Physical Exam   Constitutional: She is oriented to person, place, and time. She appears well-developed and well-nourished. No distress.   HENT:   Head: Normocephalic and atraumatic.   Right Ear: Tympanic membrane, external ear and ear canal normal. Decreased hearing (cerumen impaction see procedure note) is noted.   Left Ear: Tympanic membrane, external ear and ear canal normal. Decreased hearing (cerumen impaction see procedure note) is noted.   Nose: Nose normal.   Mouth/Throat: Uvula is midline, oropharynx is clear and moist and mucous membranes are normal.   Pedro: Midline  Rinne: AC > BC @ 512Hz   Eyes: Pupils are equal, round, and reactive to light. Conjunctivae and EOM are  normal.   Neck: Normal range of motion. No tracheal deviation present. No thyromegaly present.   Cardiovascular: Normal rate and regular rhythm.   Pulmonary/Chest: Effort normal. No respiratory distress.   Musculoskeletal: Normal range of motion.   Lymphadenopathy:        Head (right side): No submental, no submandibular and no tonsillar adenopathy present.        Head (left side): No submental, no submandibular and no tonsillar adenopathy present.     She has no cervical adenopathy.   Neurological: She is alert and oriented to person, place, and time.   Psychiatric: She has a normal mood and affect. Her behavior is normal.   Nursing note and vitals reviewed.      Data:      Audiogram tracings independently reviewed and discussed with patient.  There is a high frequency SNHL with overall normal pure tone average, and speech discrimination is 100%.  Tympanometry is type A in both ears.   Assessment:       1. Impacted cerumen of right ear    2. Bilateral high frequency sensorineural hearing loss        Plan:        ears cleaned   recommend repeat audio in 2-3 yrs, no need for intervention at this time

## 2019-04-17 NOTE — PROCEDURES
Ear Cerumen Removal  Date/Time: 4/17/2019 10:39 AM  Performed by: Carl Gresham NP  Authorized by: Carl Gresham NP     Location details:  Right ear  Procedure type: curette    Cerumen  Removal Results:  Cerumen completely removed  Patient tolerance:  Patient tolerated the procedure well with no immediate complications     Patient was brought to the minor procedure room and using the operating microscope of the right ear canal which was cleaned of ceruminous debris. There was a significant cerumen impaction.  Using a combination of suction, curettes and cup forceps the patient's cerumen impaction was removed. Tympanic membrane intact. Pt tolerated well. There were no complications.

## 2019-04-17 NOTE — PROGRESS NOTES
Audiologic Evaluation    Selma Lux MD was seen on the above date for a hearing evaluation. Selma Lux MD reports her children complain of decreased hearing sensitivity. Selma Lux MD denies decreased hearing sensitivity or tinnitus.    Audiometric testing via insert ear phones indicated normal hearing sensitivity with a mild sensorineural hearing loss at 4000 and 8000 Hz in the right ear and essentially normal hearing sensitivity for 250-8000 Hz in the left ear. Pure tone average and speech recognition threshold were in good agreement. Excellent speech discrimination ability was demonstrated in quiet when novel words were presented at a conversational level in each ear.      Recommendations:  1) Otologic consultation.  2) Annual audiometric testing to monitor hearing sensitivity.  3) Hearing protection in the presence of excessive noise.

## 2019-04-18 ENCOUNTER — DOCUMENTATION ONLY (OUTPATIENT)
Dept: INTERNAL MEDICINE | Facility: CLINIC | Age: 84
End: 2019-04-18

## 2019-04-18 ENCOUNTER — TELEPHONE (OUTPATIENT)
Dept: PULMONOLOGY | Facility: CLINIC | Age: 84
End: 2019-04-18

## 2019-04-18 NOTE — TELEPHONE ENCOUNTER
----- Message from Starr Lagos sent at 4/18/2019 11:50 AM CDT -----  Contact:        Rosy Rosado     [phone]  Needs Advice    Reason for call:  Pt   Daughter   Requesting to speak with the clinic manager         Communication Preference:    Additional Information:

## 2019-04-18 NOTE — TELEPHONE ENCOUNTER
I spoke to Ms. Rosado, informed her that I have received her message requesting to speak with the clinic manager. I proceeded to explain to Ms. Rosado that currently my manager was in a meeting and not available at the moment, I also asked if she would be able to refax me the information that she needed to have filled out again, she replied she's not around a fax machine. I then asked Ms. Rosado if she would be able to read exactly what she needed to be typed so that we would be able to provide her with a letter, she replied no she could not; she would rather speak to my clinic manager. I informed Ms. Rosado that I would relay this message to my manager to review and advise. She verbalize that she understand & will be waiting for her to call.

## 2019-04-26 ENCOUNTER — TELEPHONE (OUTPATIENT)
Dept: ADMINISTRATIVE | Facility: CLINIC | Age: 84
End: 2019-04-26

## 2019-04-26 NOTE — TELEPHONE ENCOUNTER
Home Health SOC 03/19/2019 - 05/17/2019 with [facility] - Dr. Bhargav Lee. Patient received SN, PT and OT services.

## 2019-04-30 ENCOUNTER — EXTERNAL CHRONIC CARE MANAGEMENT (OUTPATIENT)
Dept: PRIMARY CARE CLINIC | Facility: CLINIC | Age: 84
End: 2019-04-30
Payer: MEDICARE

## 2019-04-30 PROCEDURE — 99487 PR COMPLX CHRON CARE MGMT, 1ST HR, PER MONTH: ICD-10-PCS | Mod: S$PBB,,, | Performed by: FAMILY MEDICINE

## 2019-04-30 PROCEDURE — 99487 CPLX CHRNC CARE 1ST 60 MIN: CPT | Mod: PBBFAC | Performed by: FAMILY MEDICINE

## 2019-04-30 PROCEDURE — 99489 PR COMPLX CHRON CARE MGMT, EA ADDTL 30 MIN, PER MONTH: ICD-10-PCS | Mod: S$PBB,,, | Performed by: FAMILY MEDICINE

## 2019-04-30 PROCEDURE — 99487 CPLX CHRNC CARE 1ST 60 MIN: CPT | Mod: S$PBB,,, | Performed by: FAMILY MEDICINE

## 2019-04-30 PROCEDURE — 99489 CPLX CHRNC CARE EA ADDL 30: CPT | Mod: S$PBB,,, | Performed by: FAMILY MEDICINE

## 2019-04-30 PROCEDURE — 99489 CPLX CHRNC CARE EA ADDL 30: CPT | Mod: PBBFAC | Performed by: FAMILY MEDICINE

## 2019-04-30 RX ORDER — CLOPIDOGREL BISULFATE 75 MG/1
75 TABLET ORAL DAILY
Qty: 90 TABLET | Refills: 1 | Status: SHIPPED | OUTPATIENT
Start: 2019-04-30 | End: 2020-01-13 | Stop reason: SDUPTHER

## 2019-05-02 ENCOUNTER — TELEPHONE (OUTPATIENT)
Dept: INTERNAL MEDICINE | Facility: CLINIC | Age: 84
End: 2019-05-02

## 2019-05-02 DIAGNOSIS — M35.9 INTERSTITIAL LUNG DISEASE DUE TO CONNECTIVE TISSUE DISEASE: Primary | ICD-10-CM

## 2019-05-02 DIAGNOSIS — Z86.73 HISTORY OF STROKE: ICD-10-CM

## 2019-05-02 DIAGNOSIS — J84.89 INTERSTITIAL LUNG DISEASE DUE TO CONNECTIVE TISSUE DISEASE: Primary | ICD-10-CM

## 2019-05-02 NOTE — TELEPHONE ENCOUNTER
----- Message from Bess Hare sent at 5/2/2019  3:20 PM CDT -----  Contact: Jessica/Home Health -[phone]  Needs order for outpatient PT. Wants to go to [place]. Requesting that you call the daughter(Susu Rosado) at [phone].    Please call and advise.    Thank You

## 2019-05-02 NOTE — TELEPHONE ENCOUNTER
Spoke with pt daughter and she stated verbalized understanding notified her that md was out but the message will be sent in regards  Pt need outpatient pt referral

## 2019-05-03 ENCOUNTER — DOCUMENTATION ONLY (OUTPATIENT)
Dept: INTERNAL MEDICINE | Facility: CLINIC | Age: 84
End: 2019-05-03

## 2019-05-03 NOTE — TELEPHONE ENCOUNTER
LVM to Susu Rosado and informed that    Fax done today to   [facility] - Physical/Occupational Therapy: Fax# [phone] about PT referral

## 2019-05-03 NOTE — PROGRESS NOTES
Fax done today to   [facility] - Physical/Occupational Therapy: Fax# [phone] about PT referral

## 2019-05-03 NOTE — TELEPHONE ENCOUNTER
Please see referral orders and please call patient to schedule a consult with external PT - please fax orders and verify. Thank you.

## 2019-05-06 ENCOUNTER — HOSPITAL ENCOUNTER (OUTPATIENT)
Dept: RADIOLOGY | Facility: HOSPITAL | Age: 84
Discharge: HOME | End: 2019-05-06
Attending: NURSE PRACTITIONER
Payer: MEDICARE

## 2019-05-06 ENCOUNTER — PATIENT MESSAGE (OUTPATIENT)
Dept: PULMONOLOGY | Facility: CLINIC | Age: 84
End: 2019-05-06

## 2019-05-06 DIAGNOSIS — R06.02 SHORTNESS OF BREATH: ICD-10-CM

## 2019-05-06 PROCEDURE — 71046 XR CHEST PA AND LATERAL: ICD-10-PCS | Mod: 26,,, | Performed by: RADIOLOGY

## 2019-05-06 PROCEDURE — 71046 X-RAY EXAM CHEST 2 VIEWS: CPT | Mod: TC,FY

## 2019-05-06 PROCEDURE — 71046 X-RAY EXAM CHEST 2 VIEWS: CPT | Mod: 26,,, | Performed by: RADIOLOGY

## 2019-05-08 ENCOUNTER — PROCEDURE VISIT (OUTPATIENT)
Dept: NEUROLOGY | Facility: CLINIC | Age: 84
End: 2019-05-08
Payer: MEDICARE

## 2019-05-08 VITALS
BODY MASS INDEX: 29.06 KG/M2 | SYSTOLIC BLOOD PRESSURE: 133 MMHG | HEART RATE: 86 BPM | HEIGHT: 64 IN | WEIGHT: 170.19 LBS | DIASTOLIC BLOOD PRESSURE: 75 MMHG

## 2019-05-08 DIAGNOSIS — F48.8: Primary | ICD-10-CM

## 2019-05-08 PROCEDURE — 99499 NO LOS: ICD-10-PCS | Mod: S$PBB,,, | Performed by: PSYCHIATRY & NEUROLOGY

## 2019-05-08 PROCEDURE — 99499 UNLISTED E&M SERVICE: CPT | Mod: S$PBB,,, | Performed by: PSYCHIATRY & NEUROLOGY

## 2019-05-08 RX ORDER — PREDNISONE 20 MG/1
TABLET ORAL
Qty: 30 TABLET | Refills: 0 | OUTPATIENT
Start: 2019-05-08

## 2019-05-08 RX ORDER — AMLODIPINE BESYLATE 5 MG/1
TABLET ORAL
Qty: 90 TABLET | Refills: 0 | Status: SHIPPED | OUTPATIENT
Start: 2019-05-08 | End: 2019-06-04 | Stop reason: SDUPTHER

## 2019-05-08 RX ORDER — PREDNISONE 20 MG/1
40 TABLET ORAL DAILY
Qty: 60 TABLET | Refills: 3 | Status: SHIPPED | OUTPATIENT
Start: 2019-05-08 | End: 2019-07-16

## 2019-05-08 RX ORDER — AMLODIPINE BESYLATE 5 MG/1
5 TABLET ORAL DAILY
Qty: 90 TABLET | Refills: 1 | Status: SHIPPED | OUTPATIENT
Start: 2019-05-08 | End: 2019-05-21 | Stop reason: SDUPTHER

## 2019-05-09 ENCOUNTER — TELEPHONE (OUTPATIENT)
Dept: NEUROLOGY | Facility: CLINIC | Age: 84
End: 2019-05-09

## 2019-05-09 NOTE — TELEPHONE ENCOUNTER
----- Message from Shelby Suazo sent at 5/9/2019  1:58 PM CDT -----  Contact: Rosy (dtaure) @ [phone]  Calling to speak with someone regarding the patient getting something for pain, says patient had a nerve block on yesterday. Please call.

## 2019-05-14 ENCOUNTER — OFFICE VISIT (OUTPATIENT)
Dept: OPTOMETRY | Facility: CLINIC | Age: 84
End: 2019-05-14
Payer: MEDICARE

## 2019-05-14 DIAGNOSIS — Z79.899 LONG-TERM USE OF PLAQUENIL: ICD-10-CM

## 2019-05-14 DIAGNOSIS — H43.393 VISUAL FLOATERS, BILATERAL: ICD-10-CM

## 2019-05-14 DIAGNOSIS — H04.123 DRY EYE SYNDROME OF BOTH EYES: ICD-10-CM

## 2019-05-14 DIAGNOSIS — Z96.1 PSEUDOPHAKIA: ICD-10-CM

## 2019-05-14 DIAGNOSIS — H26.499 POSTERIOR CAPSULAR OPACIFICATION, UNSPECIFIED LATERALITY: ICD-10-CM

## 2019-05-14 DIAGNOSIS — H35.012 RETINAL MACROANEURYSM OF LEFT EYE: ICD-10-CM

## 2019-05-14 DIAGNOSIS — H35.352 CYSTOID MACULAR EDEMA, LEFT EYE: Primary | ICD-10-CM

## 2019-05-14 PROCEDURE — 92014 PR EYE EXAM, EST PATIENT,COMPREHESV: ICD-10-PCS | Mod: S$PBB,,, | Performed by: OPTOMETRIST

## 2019-05-14 PROCEDURE — 99999 PR PBB SHADOW E&M-EST. PATIENT-LVL III: ICD-10-PCS | Mod: PBBFAC,,, | Performed by: OPTOMETRIST

## 2019-05-14 PROCEDURE — 99213 OFFICE O/P EST LOW 20 MIN: CPT | Mod: PBBFAC | Performed by: OPTOMETRIST

## 2019-05-14 PROCEDURE — 92014 COMPRE OPH EXAM EST PT 1/>: CPT | Mod: S$PBB,,, | Performed by: OPTOMETRIST

## 2019-05-14 PROCEDURE — 99999 PR PBB SHADOW E&M-EST. PATIENT-LVL III: CPT | Mod: PBBFAC,,, | Performed by: OPTOMETRIST

## 2019-05-15 NOTE — PROGRESS NOTES
"HPI     SANAZ:09/2018  Glasses? NO  Contacts? NO  H/o eye surgery, injections or laser: Cat sx OU  H/o eye injury: no  Known eye conditions? no  Family h/o eye conditions? Mother Cataract, Glaucoma   Eye gtts?Yes systane     (-) Flashes (+) Floaters infrequent   (-) Mucous   (-) Tearing (-) Itching (-) Burning   (-) Headaches (-) Eye Pain/discomfort (-) Irritation   (-) Redness (-) Double vision (-) Blurry vision  Pt states she feels like her lens in her eye "came a loose". OS first then   OD.     Diabetic? (-)  A1c?  (Hemoglobin A1C       Date                     Value               Ref Range             Status                03/13/2019               5.7 (H)             4.0 - 5.6 %           Final              Comment:    ADA Screening Guidelines:  5.7-6.4%  Consistent with   prediabetes  >or=6.5%  Consistent with diabetes  High levels of fetal   hemoglobin interfere with the HbA1C  assay. Heterozygous hemoglobin   variants (HbS, HgC, etc)do  not significantly interfere with this assay.     However, presence of multiple variants may affect accuracy.         08/24/2017               5.5                 4.0 - 5.6 %           Final              Comment:    According to ADA guidelines, hemoglobin A1c <7.0% represents  optimal   control in non-pregnant diabetic patients. Different  metrics may apply to   specific patient populations.   Standards of Medical Care in   Diabetes-2016.  For the purpose of screening for the presence of   diabetes:  <5.7%     Consistent with the absence of diabetes  5.7-6.4%    Consistent with increasing risk for diabetes   (prediabetes)  >or=6.5%    Consistent with diabetes  Currently, no consensus exists for use of   hemoglobin A1c  for diagnosis of diabetes for children.  This Hemoglobin   A1c assay has significant interference with fetal   hemoglobin   (HbF).   The results are invalid for patients with abnormal amounts of   HbF,     including those with known Hereditary Persistence   of " Fetal Hemoglobin.   Heterozygous hemoglobin variants (HbAS, HbAC,   HbAD, HbAE, HbA2) do not   significantly interfere with this assay;   however, presence of multiple   variants in a sample may impact the %   interference.         01/12/2016               5.6                 4.5 - 6.2 %           Final            ----------)          Last edited by Yair Ashraf, OD on 5/14/2019  2:49 PM. (History)        ROS     Negative for: Constitutional, Gastrointestinal, Neurological, Skin,   Genitourinary, Musculoskeletal, HENT, Endocrine, Cardiovascular, Eyes,   Respiratory, Psychiatric, Allergic/Imm, Heme/Lymph    Last edited by Yair Ashraf, OD on 5/14/2019  2:48 PM. (History)        Assessment /Plan     For exam results, see Encounter Report.    Cystoid macular edema, left eye    Retinal macroaneurysm of left eye    Posterior capsular opacification, unspecified laterality    Dry eye syndrome of both eyes    Visual floaters, bilateral    Long-term use of Plaquenil    Pseudophakia      1-2. Cont f/u with Dr Saeed.   3, 7. Pt felt lens had become displaced. Pt assured today that lenses are in place. Monitor.   4. Recommend Systane Ultra or Refresh Optive BID-TID OU to aid with symptoms of dry eyes.  5. No e/o h/b/t 360 degrees OU. Monitor for worsening of symptoms or S/Sx of RD.   6. No e/o maculopathy. Cont f/u with Dr Saeed.

## 2019-05-20 ENCOUNTER — TELEPHONE (OUTPATIENT)
Dept: INTERNAL MEDICINE | Facility: CLINIC | Age: 84
End: 2019-05-20

## 2019-05-20 NOTE — TELEPHONE ENCOUNTER
On 5/3 I received a request for outpatient PT and signed those orders.     Today, I received a request for re certification for Home health. I will deny that.     If patient is attending outpatient PT, that removes her from a 'home bound' status, and thus she is ineligible for home health services.      Please look into this. Also, patient last seen in January. Is a bit overdue for a follow up. Please schedule. Thank you.  Scheduled pt for 06/04/19. Notified pt's daughter of the above info.

## 2019-05-20 NOTE — TELEPHONE ENCOUNTER
On 5/3 I received a request for outpatient PT and signed those orders.    Today, I received a request for re certification for Home health. I will deny that.    If patient is attending outpatient PT, that removes her from a 'home bound' status, and thus she is ineligible for home health services.     Please look into this. Also, patient last seen in January. Is a bit overdue for a follow up. Please schedule. Thank you.

## 2019-05-21 ENCOUNTER — PATIENT OUTREACH (OUTPATIENT)
Dept: ADMINISTRATIVE | Facility: HOSPITAL | Age: 84
End: 2019-05-21

## 2019-05-21 ENCOUNTER — TELEPHONE (OUTPATIENT)
Dept: INTERNAL MEDICINE | Facility: CLINIC | Age: 84
End: 2019-05-21

## 2019-05-21 ENCOUNTER — OFFICE VISIT (OUTPATIENT)
Dept: RHEUMATOLOGY | Facility: CLINIC | Age: 84
End: 2019-05-21
Payer: MEDICARE

## 2019-05-21 VITALS
DIASTOLIC BLOOD PRESSURE: 68 MMHG | HEART RATE: 79 BPM | HEIGHT: 65 IN | SYSTOLIC BLOOD PRESSURE: 118 MMHG | BODY MASS INDEX: 28.32 KG/M2

## 2019-05-21 DIAGNOSIS — M89.9 DISORDER OF BONE: ICD-10-CM

## 2019-05-21 DIAGNOSIS — M05.79 SEROPOSITIVE RHEUMATOID ARTHRITIS OF MULTIPLE SITES: ICD-10-CM

## 2019-05-21 DIAGNOSIS — J84.116 CRYPTOGENIC ORGANIZING PNEUMONIA: ICD-10-CM

## 2019-05-21 DIAGNOSIS — Z79.60 LONG-TERM USE OF IMMUNOSUPPRESSANT MEDICATION: ICD-10-CM

## 2019-05-21 PROBLEM — J84.89 INTERSTITIAL LUNG DISEASE DUE TO CONNECTIVE TISSUE DISEASE: Status: RESOLVED | Noted: 2019-03-14 | Resolved: 2019-05-21

## 2019-05-21 PROBLEM — R93.89 ABNORMAL CHEST CT: Status: RESOLVED | Noted: 2018-11-23 | Resolved: 2019-05-21

## 2019-05-21 PROBLEM — I49.3 FREQUENT PVCS: Status: RESOLVED | Noted: 2018-10-03 | Resolved: 2019-05-21

## 2019-05-21 PROBLEM — M35.9 INTERSTITIAL LUNG DISEASE DUE TO CONNECTIVE TISSUE DISEASE: Status: RESOLVED | Noted: 2019-03-14 | Resolved: 2019-05-21

## 2019-05-21 PROCEDURE — 99213 OFFICE O/P EST LOW 20 MIN: CPT | Mod: PBBFAC | Performed by: INTERNAL MEDICINE

## 2019-05-21 PROCEDURE — 99999 PR PBB SHADOW E&M-EST. PATIENT-LVL III: CPT | Mod: PBBFAC,,, | Performed by: INTERNAL MEDICINE

## 2019-05-21 PROCEDURE — 99214 PR OFFICE/OUTPT VISIT, EST, LEVL IV, 30-39 MIN: ICD-10-PCS | Mod: S$PBB,,, | Performed by: INTERNAL MEDICINE

## 2019-05-21 PROCEDURE — 99214 OFFICE O/P EST MOD 30 MIN: CPT | Mod: S$PBB,,, | Performed by: INTERNAL MEDICINE

## 2019-05-21 PROCEDURE — 99999 PR PBB SHADOW E&M-EST. PATIENT-LVL III: ICD-10-PCS | Mod: PBBFAC,,, | Performed by: INTERNAL MEDICINE

## 2019-05-21 RX ORDER — POTASSIUM CHLORIDE 750 MG/1
20 TABLET, EXTENDED RELEASE ORAL DAILY
Qty: 60 TABLET | Refills: 0 | Status: SHIPPED | OUTPATIENT
Start: 2019-05-21 | End: 2019-06-04 | Stop reason: SDUPTHER

## 2019-05-21 RX ORDER — LANOLIN ALCOHOL/MO/W.PET/CERES
400 CREAM (GRAM) TOPICAL DAILY
Refills: 0 | COMMUNITY
Start: 2019-05-21 | End: 2019-07-10 | Stop reason: SDUPTHER

## 2019-05-21 RX ORDER — GABAPENTIN 300 MG/1
300 CAPSULE ORAL NIGHTLY
Qty: 90 CAPSULE | Refills: 0 | Status: SHIPPED | OUTPATIENT
Start: 2019-05-21 | End: 2019-08-05 | Stop reason: SDUPTHER

## 2019-05-21 RX ORDER — OMEPRAZOLE 20 MG/1
20 CAPSULE, DELAYED RELEASE ORAL DAILY
Qty: 90 CAPSULE | Refills: 0 | Status: SHIPPED | OUTPATIENT
Start: 2019-05-21 | End: 2019-08-26 | Stop reason: SDUPTHER

## 2019-05-21 RX ORDER — MONTELUKAST SODIUM 10 MG/1
10 TABLET ORAL NIGHTLY
Qty: 90 TABLET | Refills: 0 | Status: SHIPPED | OUTPATIENT
Start: 2019-05-21 | End: 2019-08-26 | Stop reason: SDUPTHER

## 2019-05-21 RX ORDER — AMLODIPINE BESYLATE 5 MG/1
5 TABLET ORAL DAILY
Qty: 90 TABLET | Refills: 0 | Status: SHIPPED | OUTPATIENT
Start: 2019-05-21 | End: 2019-08-05 | Stop reason: SDUPTHER

## 2019-05-21 NOTE — ASSESSMENT & PLAN NOTE
Had pneumonia in March  Should be safe to cont HCQ and SSZ    Will need DXA as she will likely continue prednisone at >7.5 mg/d

## 2019-05-21 NOTE — PROGRESS NOTES
Health Maintenance Due   Topic Date Due    DEXA SCAN  02/04/2018    Pneumococcal Vaccine (65+ High/Highest Risk) (2 of 2 - PPSV23) 05/02/2018     Pre-visit chart audit complete.

## 2019-05-21 NOTE — ASSESSMENT & PLAN NOTE
"Had worsening in March and is on more prednisone  Xray from April :"Since April 1, 2019, slight decrease in right lower lung zone airspace opacities. Unchanged left upper lung zone airspace opacities.  No new abnormality."  Can possibly lower prednisone but I will direct this question to pulmonary  "

## 2019-05-21 NOTE — ASSESSMENT & PLAN NOTE
Still taking SSZ 1000 BID plus  once daily  Still on prednisone 30 mg/d since last pneumonia in March  Some hand pain when uses walker  Shortness of breath is not limiting her  PE no synovitis  RA appears stable from articular perspective  Will cont SSZ and HCQ  Will defer to pulmonary re: pred taper

## 2019-05-21 NOTE — TELEPHONE ENCOUNTER
Pt requests tramadol refill but BPA table pops up and I can't pend the med to you    Please send the tramadol over if possible. Thank you

## 2019-05-21 NOTE — PROGRESS NOTES
"Subjective:       Patient ID: Selma Rosado MD Jose J is a 83 y.o. female.    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)     TKP 2009 and 2014  CTS bilaterally surgery around 2011 2012 saw Dr No who recommended MTX ; she was afraid  Then saw Dr Qureshi; he Rx  plus prednisone 5 mg/d    April 2018 one injection of Humira and 2 weeks later admitted to ICU with resp infection; spent a month in ICU, then weeks in rehab; and home  end of June; dx cryptogenic organizing pneumonia    Stroke summer 2017  Viral meningitis with 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 2018     I last saw her in Feb and she tapered pred to 5 mg/d then was readmitted in March with worsened lung disease and later d/c on prednisone 40 mg that was tapered to 30 in April     RA has been quiet on this much prednisone  Still taking SSZ 1000 bid plus  once daily    Having depression recently    Review of Systems      Objective:   /68   Pulse 79   Ht 5' 5" (1.651 m)   LMP  (LMP Unknown)   BMI 28.32 kg/m²      Physical Exam   Constitutional: She is well-developed, well-nourished, and in no distress.   Examined in wheelchair   HENT:   Mouth/Throat: Oropharynx is clear and moist.   Eyes: Conjunctivae are normal.   Cardiovascular: Normal rate and regular rhythm.    Pulmonary/Chest: She has no wheezes. She has no rales.   Lymphadenopathy:     She has no cervical adenopathy.   Skin: No rash noted.     Musculoskeletal:   No synovitis          Physical Exam     SHEIKH-28 tender joint count: 0  SHEIKH-28 swollen joint count: 0  ESR (mm/hr): 5    Lab Results   Component Value Date    SEDRATE 5 05/21/2019          Assessment:       1. Seropositive rheumatoid arthritis of multiple sites    2. Long-term use of immunosuppressant medication    3. Cryptogenic organizing pneumonia    4. Disorder of bone             Plan:       Problem List Items Addressed This Visit        Active Problems    " "Seropositive rheumatoid arthritis of multiple sites     Still taking SSZ 1000 BID plus  once daily  Still on prednisone 30 mg/d since last pneumonia in March  Some hand pain when uses walker  Shortness of breath is not limiting her  PE no synovitis  RA appears stable from articular perspective  Will cont SSZ and HCQ  Will defer to pulmonary re: pred taper         Relevant Orders    DXA Bone Density Spine And Hip    Cryptogenic organizing pneumonia     Had worsening in March and is on more prednisone  Xray from April :"Since April 1, 2019, slight decrease in right lower lung zone airspace opacities. Unchanged left upper lung zone airspace opacities.  No new abnormality."  Can possibly lower prednisone but I will direct this question to pulmonary         Long-term use of immunosuppressant medication     Had pneumonia in March  Should be safe to cont HCQ and SSZ    Will need DXA as she will likely continue prednisone at >7.5 mg/d           Other Visit Diagnoses     Disorder of bone         Relevant Orders    DXA Bone Density Spine And Hip            "

## 2019-05-22 NOTE — TELEPHONE ENCOUNTER
Spoke to pt's daughter and advised. She said its fine and will wait till see Dr. Lee.  Also informed that requested medication refilled and sent by electronic prescription to the pharmacy. Advised pt to call pharmacy prior . Pt verbalized understanding

## 2019-05-31 ENCOUNTER — EXTERNAL CHRONIC CARE MANAGEMENT (OUTPATIENT)
Dept: PRIMARY CARE CLINIC | Facility: CLINIC | Age: 84
End: 2019-05-31
Payer: MEDICARE

## 2019-05-31 PROCEDURE — 99490 CHRNC CARE MGMT STAFF 1ST 20: CPT | Mod: PBBFAC | Performed by: FAMILY MEDICINE

## 2019-05-31 PROCEDURE — 99490 PR CHRONIC CARE MGMT, 1ST 20 MIN: ICD-10-PCS | Mod: S$PBB,,, | Performed by: FAMILY MEDICINE

## 2019-05-31 PROCEDURE — 99490 CHRNC CARE MGMT STAFF 1ST 20: CPT | Mod: S$PBB,,, | Performed by: FAMILY MEDICINE

## 2019-06-03 ENCOUNTER — PATIENT MESSAGE (OUTPATIENT)
Dept: RHEUMATOLOGY | Facility: CLINIC | Age: 84
End: 2019-06-03

## 2019-06-04 ENCOUNTER — OFFICE VISIT (OUTPATIENT)
Dept: INTERNAL MEDICINE | Facility: CLINIC | Age: 84
End: 2019-06-04
Attending: FAMILY MEDICINE
Payer: MEDICARE

## 2019-06-04 ENCOUNTER — HOSPITAL ENCOUNTER (OUTPATIENT)
Dept: RADIOLOGY | Facility: CLINIC | Age: 84
Discharge: HOME | End: 2019-06-04
Attending: INTERNAL MEDICINE
Payer: MEDICARE

## 2019-06-04 VITALS
DIASTOLIC BLOOD PRESSURE: 68 MMHG | BODY MASS INDEX: 28.14 KG/M2 | SYSTOLIC BLOOD PRESSURE: 128 MMHG | TEMPERATURE: 98 F | HEIGHT: 64 IN | WEIGHT: 164.81 LBS | HEART RATE: 80 BPM | OXYGEN SATURATION: 96 %

## 2019-06-04 DIAGNOSIS — I10 ESSENTIAL HYPERTENSION: ICD-10-CM

## 2019-06-04 DIAGNOSIS — M05.79 SEROPOSITIVE RHEUMATOID ARTHRITIS OF MULTIPLE SITES: ICD-10-CM

## 2019-06-04 DIAGNOSIS — Z86.73 HISTORY OF STROKE: ICD-10-CM

## 2019-06-04 DIAGNOSIS — G24.9 DYSTONIA: ICD-10-CM

## 2019-06-04 DIAGNOSIS — Z02.89 PAIN MANAGEMENT CONTRACT AGREEMENT: ICD-10-CM

## 2019-06-04 DIAGNOSIS — Z79.60 LONG-TERM USE OF IMMUNOSUPPRESSANT MEDICATION: ICD-10-CM

## 2019-06-04 DIAGNOSIS — M43.02 CERVICAL SPONDYLOLYSIS: ICD-10-CM

## 2019-06-04 DIAGNOSIS — M89.9 DISORDER OF BONE: ICD-10-CM

## 2019-06-04 DIAGNOSIS — J84.116 CRYPTOGENIC ORGANIZING PNEUMONIA: Primary | ICD-10-CM

## 2019-06-04 DIAGNOSIS — F01.50 VASCULAR DEMENTIA WITHOUT BEHAVIORAL DISTURBANCE: ICD-10-CM

## 2019-06-04 PROBLEM — J96.01 ACUTE HYPOXEMIC RESPIRATORY FAILURE: Status: RESOLVED | Noted: 2018-04-11 | Resolved: 2019-06-04

## 2019-06-04 PROCEDURE — 99999 PR PBB SHADOW E&M-EST. PATIENT-LVL III: CPT | Mod: PBBFAC,,, | Performed by: FAMILY MEDICINE

## 2019-06-04 PROCEDURE — 77080 DEXA BONE DENSITY SPINE HIP: ICD-10-PCS | Mod: 26,,, | Performed by: INTERNAL MEDICINE

## 2019-06-04 PROCEDURE — 99213 OFFICE O/P EST LOW 20 MIN: CPT | Mod: PBBFAC,25 | Performed by: FAMILY MEDICINE

## 2019-06-04 PROCEDURE — 77080 DXA BONE DENSITY AXIAL: CPT | Mod: TC

## 2019-06-04 PROCEDURE — 99214 OFFICE O/P EST MOD 30 MIN: CPT | Mod: S$PBB,,, | Performed by: FAMILY MEDICINE

## 2019-06-04 PROCEDURE — 99999 PR PBB SHADOW E&M-EST. PATIENT-LVL III: ICD-10-PCS | Mod: PBBFAC,,, | Performed by: FAMILY MEDICINE

## 2019-06-04 PROCEDURE — 99214 PR OFFICE/OUTPT VISIT, EST, LEVL IV, 30-39 MIN: ICD-10-PCS | Mod: S$PBB,,, | Performed by: FAMILY MEDICINE

## 2019-06-04 PROCEDURE — 77080 DXA BONE DENSITY AXIAL: CPT | Mod: 26,,, | Performed by: INTERNAL MEDICINE

## 2019-06-04 RX ORDER — SULFASALAZINE 500 MG/1
1000 TABLET, DELAYED RELEASE ORAL 2 TIMES DAILY
Qty: 120 TABLET | Refills: 2 | Status: ON HOLD
Start: 2019-06-04 | End: 2019-09-05 | Stop reason: HOSPADM

## 2019-06-04 RX ORDER — POTASSIUM CHLORIDE 750 MG/1
20 TABLET, EXTENDED RELEASE ORAL DAILY
Qty: 60 TABLET | Refills: 0 | Status: SHIPPED | OUTPATIENT
Start: 2019-06-04 | End: 2019-07-10 | Stop reason: SDUPTHER

## 2019-06-04 RX ORDER — TRAMADOL HYDROCHLORIDE 50 MG/1
50-100 TABLET ORAL
Qty: 40 TABLET | Refills: 0 | Status: ON HOLD | OUTPATIENT
Start: 2019-06-04 | End: 2019-08-30 | Stop reason: HOSPADM

## 2019-06-04 NOTE — PROGRESS NOTES
Subjective:       Patient ID: Selma Rosado MD Jose J is a 83 y.o. female.    Chief Complaint: Follow-up    HPI  Review of Systems   Constitutional: Positive for activity change. Negative for chills, fatigue, fever and unexpected weight change.   HENT: Negative for congestion, hearing loss, rhinorrhea and trouble swallowing.    Eyes: Negative for discharge, redness and visual disturbance.   Respiratory: Negative for cough, chest tightness, shortness of breath and wheezing.    Cardiovascular: Positive for palpitations. Negative for chest pain and leg swelling.   Gastrointestinal: Negative for abdominal pain, blood in stool, constipation, diarrhea and vomiting.   Genitourinary: Negative for difficulty urinating, dysuria, hematuria and menstrual problem.   Musculoskeletal: Positive for arthralgias, joint swelling and neck pain. Negative for back pain, gait problem and myalgias.   Skin: Negative for color change and rash.   Neurological: Positive for headaches. Negative for tremors, speech difficulty, weakness and numbness.   Hematological: Negative for adenopathy. Does not bruise/bleed easily.   Psychiatric/Behavioral: Positive for dysphoric mood. Negative for behavioral problems, confusion and sleep disturbance. The patient is not nervous/anxious.        Objective:      Physical Exam   Constitutional: She is oriented to person, place, and time. She appears well-developed and well-nourished.   HENT:   Head: Normocephalic and atraumatic.   Eyes: Conjunctivae are normal. No scleral icterus.   Neck: Normal range of motion. Neck supple.   Cardiovascular: Normal rate, normal heart sounds and intact distal pulses. Exam reveals no gallop and no friction rub.   No murmur heard.  Pulmonary/Chest: Effort normal. No respiratory distress. She has decreased breath sounds. She has no wheezes. She has rhonchi. She has no rales.   Abdominal: She exhibits no distension and no abdominal bruit. There is no tenderness.   Musculoskeletal:  She exhibits no edema or deformity.        Right foot: There is tenderness and swelling.   Neurological: She is alert and oriented to person, place, and time. She displays no tremor. No cranial nerve deficit. Coordination and gait normal.   Skin: Skin is warm and dry. No rash noted. She is not diaphoretic. No erythema.   Psychiatric: She has a normal mood and affect. Her behavior is normal. Judgment and thought content normal.   Nursing note and vitals reviewed.      Assessment:       1. Cryptogenic organizing pneumonia    2. Seropositive rheumatoid arthritis of multiple sites    3. Vascular dementia without behavioral disturbance    4. Cervical spondylolysis    5. History of stroke    6. Essential hypertension    7. Dystonia    8. Long-term use of immunosuppressant medication    9. Pain management contract agreement        Plan:     Medication List with Changes/Refills   Current Medications    ACETAMINOPHEN (TYLENOL) 500 MG TABLET    Take 1,000 mg by mouth daily as needed for Pain.    ALBUTEROL (PROVENTIL/VENTOLIN HFA) 90 MCG/ACTUATION INHALER    Inhale 2 puffs into the lungs every 6 (six) hours as needed for Wheezing. Rescue    AMLODIPINE (NORVASC) 5 MG TABLET    Take 1 tablet (5 mg total) by mouth once daily.    ASPIRIN (ECOTRIN) 81 MG EC TABLET    Take 81 mg by mouth once daily.    BACLOFEN (LIORESAL) 10 MG TABLET    Take 1 tablet (10 mg total) by mouth 3 (three) times daily.    CARVEDILOL (COREG) 12.5 MG TABLET    Take 1 tablet (12.5 mg total) by mouth 2 (two) times daily with meals.    CLOPIDOGREL (PLAVIX) 75 MG TABLET    Take 1 tablet (75 mg total) by mouth once daily.    DIAZEPAM (VALIUM) 2 MG TABLET    Take 1 tablet (2 mg total) by mouth as needed for Anxiety.    FUROSEMIDE (LASIX) 20 MG TABLET    Take 1 tablet (20 mg total) by mouth once daily.    GABAPENTIN (NEURONTIN) 300 MG CAPSULE    Take 1 capsule (300 mg total) by mouth every evening.    HYDROXYCHLOROQUINE (PLAQUENIL) 200 MG TABLET    Take 1 tablet  (200 mg total) by mouth 2 (two) times daily.    LINACLOTIDE (LINZESS) 145 MCG CAP CAPSULE    Take 1 capsule (145 mcg total) by mouth once daily.    MAGNESIUM OXIDE (MAG-OX) 400 MG (241.3 MG MAGNESIUM) TABLET    Take 1 tablet (400 mg total) by mouth once daily.    MONTELUKAST (SINGULAIR) 10 MG TABLET    Take 1 tablet (10 mg total) by mouth every evening.    OMEPRAZOLE (PRILOSEC) 20 MG CAPSULE    Take 1 capsule (20 mg total) by mouth once daily.    POTASSIUM CHLORIDE SA (K-DUR,KLOR-CON) 10 MEQ TABLET    Take 2 tablets (20 mEq total) by mouth once daily.    PREDNISONE (DELTASONE) 20 MG TABLET    Take 2 tablets (40 mg total) by mouth once daily.    SULFASALAZINE (AZULFIDINE) 500 MG TBEC    Take 2 tablets (1,000 mg total) by mouth 2 (two) times daily.   Changed and/or Refilled Medications    Modified Medication Previous Medication    TRAMADOL (ULTRAM) 50 MG TABLET traMADol (ULTRAM) 50 mg tablet       Take 1-2 tablets ( mg total) by mouth every 24 hours as needed for Pain.    Take 1-2 tablets ( mg total) by mouth every 24 hours as needed for Pain.   Discontinued Medications    AMLODIPINE (NORVASC) 5 MG TABLET    TAKE 1 TABLET BY MOUTH EVERY DAY     Selma was seen today for follow-up.    Diagnoses and all orders for this visit:    Cryptogenic organizing pneumonia    Seropositive rheumatoid arthritis of multiple sites    Vascular dementia without behavioral disturbance    Cervical spondylolysis    History of stroke    Essential hypertension    Dystonia    Long-term use of immunosuppressant medication    Pain management contract agreement  Comments:  signed 6/3/19 (again - first agreement not in file)    Other orders  -     traMADol (ULTRAM) 50 mg tablet; Take 1-2 tablets ( mg total) by mouth every 24 hours as needed for Pain.      See meds, orders, follow up, routing and instructions sections of encounter.  HISTORY OF PRESENT ILLNESS:  The patient is in for a followup.  We received a   request for pain  medicine refill, but had not seen her since January 2019.    There was also some confusion concerning Home Health in early May 2019.  We were   asked to sign an order; however, we were concurrently asked to sign an order   for outpatient PT.  Daughter states that Home Health ended in early May 2019.    She is doing PT at [facility] and this is her second week.  She walks with a walker   at home.  She has right foot swelling.  This occurred about 2 weeks ago,   possibly from sitting on the porch.  There was no definite history of trauma.    The patient declines an x-ray at this point.    PHYSICAL EXAMINATION:  MUSCULOSKELETAL:  She has some darkened swelling to the area between the 2   straps of her shoe to the right.  It is not particularly tender.  She is able to   move her toes without difficulty.    ASSESSMENT AND PLAN:  She is down at a 30 mg dose of prednisone at this time.    This is a balance between lowest possible dose and effectiveness for her .    She is not reporting any pulmonary symptoms at this time and I did review her   Pulmonology and Rheumatology notes.    Her pain contract cannot be located.  The pain contract problem list entry had   been removed.  We had her sign a new pain contract today.  Her  was reviewed   and we did submit that for scanning.  I added that back into the problem list.    Trial of tramadol.  Follow up in 3 months.  Notify me for any significant   interval problems.      SELENA/IN  dd: 06/04/2019 17:18:35 (CDT)  td: 06/05/2019 10:47:46 (CDT)  Doc ID   #[id number]  Job ID #[id number]    CC:

## 2019-06-05 RX ORDER — HYDROXYCHLOROQUINE SULFATE 200 MG/1
200 TABLET, FILM COATED ORAL 2 TIMES DAILY
Qty: 60 TABLET | Refills: 2 | Status: SHIPPED | OUTPATIENT
Start: 2019-06-05 | End: 2019-07-10 | Stop reason: SDUPTHER

## 2019-06-21 RX ORDER — FUROSEMIDE 20 MG/1
TABLET ORAL
Qty: 30 TABLET | Refills: 0 | Status: SHIPPED | OUTPATIENT
Start: 2019-06-21 | End: 2019-06-24 | Stop reason: SDUPTHER

## 2019-06-24 RX ORDER — FUROSEMIDE 20 MG/1
TABLET ORAL
Qty: 30 TABLET | Refills: 0 | Status: ON HOLD | OUTPATIENT
Start: 2019-06-24 | End: 2019-09-05 | Stop reason: HOSPADM

## 2019-06-25 RX ORDER — DIAZEPAM 2 MG/1
TABLET ORAL
Qty: 30 TABLET | Refills: 0 | Status: SHIPPED | OUTPATIENT
Start: 2019-06-25 | End: 2019-07-29 | Stop reason: SDUPTHER

## 2019-06-30 ENCOUNTER — EXTERNAL CHRONIC CARE MANAGEMENT (OUTPATIENT)
Dept: PRIMARY CARE CLINIC | Facility: CLINIC | Age: 84
End: 2019-06-30
Payer: MEDICARE

## 2019-06-30 PROCEDURE — 99490 PR CHRONIC CARE MGMT, 1ST 20 MIN: ICD-10-PCS | Mod: S$PBB,,, | Performed by: FAMILY MEDICINE

## 2019-06-30 PROCEDURE — 99490 CHRNC CARE MGMT STAFF 1ST 20: CPT | Mod: S$PBB,,, | Performed by: FAMILY MEDICINE

## 2019-06-30 PROCEDURE — 99490 CHRNC CARE MGMT STAFF 1ST 20: CPT | Mod: PBBFAC | Performed by: FAMILY MEDICINE

## 2019-07-01 ENCOUNTER — PES CALL (OUTPATIENT)
Dept: ADMINISTRATIVE | Facility: CLINIC | Age: 84
End: 2019-07-01

## 2019-07-10 ENCOUNTER — PATIENT MESSAGE (OUTPATIENT)
Dept: PULMONOLOGY | Facility: CLINIC | Age: 84
End: 2019-07-10

## 2019-07-10 ENCOUNTER — PATIENT MESSAGE (OUTPATIENT)
Dept: RHEUMATOLOGY | Facility: CLINIC | Age: 84
End: 2019-07-10

## 2019-07-10 DIAGNOSIS — M05.79 SEROPOSITIVE RHEUMATOID ARTHRITIS OF MULTIPLE SITES: ICD-10-CM

## 2019-07-10 RX ORDER — POTASSIUM CHLORIDE 750 MG/1
20 TABLET, EXTENDED RELEASE ORAL DAILY
Qty: 180 TABLET | Refills: 0 | Status: ON HOLD | OUTPATIENT
Start: 2019-07-10 | End: 2019-09-05 | Stop reason: HOSPADM

## 2019-07-10 RX ORDER — HYDROXYCHLOROQUINE SULFATE 200 MG/1
200 TABLET, FILM COATED ORAL 2 TIMES DAILY
Qty: 60 TABLET | Refills: 2 | Status: SHIPPED | OUTPATIENT
Start: 2019-07-10 | End: 2019-11-22 | Stop reason: SDUPTHER

## 2019-07-10 RX ORDER — LANOLIN ALCOHOL/MO/W.PET/CERES
400 CREAM (GRAM) TOPICAL DAILY
Qty: 90 TABLET | Refills: 0 | Status: ON HOLD | COMMUNITY
Start: 2019-07-10 | End: 2019-09-05 | Stop reason: HOSPADM

## 2019-07-16 ENCOUNTER — TELEPHONE (OUTPATIENT)
Dept: RHEUMATOLOGY | Facility: CLINIC | Age: 84
End: 2019-07-16

## 2019-07-16 NOTE — TELEPHONE ENCOUNTER
----- Message from Baldomero Francis MD sent at 7/15/2019 10:43 AM CDT -----  Given the repeated relapses on lower prednisone doses, that seems like a very reasonable plan to me.  Thanks, DET    ----- Message -----  From: Lonnie Rouse MD  Sent: 7/12/2019   8:32 PM  To: Baldomero Francis MD    Certainly ok with me.  I'll start her on   bid whilst lowering prednisone from 30 to 20 if that sounds rational.   Wm  ----- Message -----  From: Baldomero Francis MD  Sent: 7/12/2019   3:28 PM  To: MD Lonnie Francis:  Given the recurring hypoxemia/infiltrates with reduced steroid doses, would it make sense to you for us to add mycophenolate before decreasing prednisone much more?  Thanks, DET     ----- Message -----  From: Lonnie Rouse MD  Sent: 5/21/2019   3:54 PM  To: Bhargav Lee MD, Baldomero Francis MD

## 2019-07-18 RX ORDER — PREDNISONE 10 MG/1
20 TABLET ORAL DAILY
Status: ON HOLD | COMMUNITY
Start: 2019-06-20 | End: 2019-08-30 | Stop reason: HOSPADM

## 2019-07-19 ENCOUNTER — PATIENT MESSAGE (OUTPATIENT)
Dept: RHEUMATOLOGY | Facility: CLINIC | Age: 84
End: 2019-07-19

## 2019-07-19 DIAGNOSIS — J84.116 CRYPTOGENIC ORGANIZING PNEUMONIA: ICD-10-CM

## 2019-07-19 DIAGNOSIS — M05.79 SEROPOSITIVE RHEUMATOID ARTHRITIS OF MULTIPLE SITES: Primary | ICD-10-CM

## 2019-07-19 RX ORDER — MYCOPHENOLATE MOFETIL 500 MG/1
500 TABLET ORAL 2 TIMES DAILY
Qty: 60 TABLET | Refills: 2 | Status: SHIPPED | OUTPATIENT
Start: 2019-07-19 | End: 2019-07-31

## 2019-07-22 ENCOUNTER — TELEPHONE (OUTPATIENT)
Dept: PHARMACY | Facility: CLINIC | Age: 84
End: 2019-07-22

## 2019-07-25 NOTE — PROGRESS NOTES
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    Chief Complaint: Neck Pain     Lux is an 84 yo female here for follow up of her neck pain from 7-8 years that worsened in November 2017.  She was last seen by me on 4/10/2019 and she was doing better and moving better with botox, and she was getting scheduled for repeat.    She had trigger point in SCM on 1/17/2018.  She could not tolerate baclofen this time.  She felt like she could not wake up.  She has been taking a half at night.  She tolerated it previously.  She has pain at the end of the day.  She falls asleep in her chair and her head nods forward.  She tried airplane pillow no success.  She feels like she has the neck pain when she thinks about it.  She had a procedure in may with Dr. Giang and feels like pain has been better.  She has not been in bed with heating pad.  She feels like the pain is not debilitating.  She is not having any pain currently.  She is not active enough.  She quit PT early.  Her daughter says she does not do the exercises so she is still on the walker.  She feels like has not been able to embrace it.  She does not like the walker.  She was going to [place] [facility] for outpatient therapy    X-ray hips 3/11/2019  No evidence of acute fracture, dislocation or bone destruction.  Moderate degenerative changes of the hip joints bilaterally left greater than right.  Severe degenerative changes of the lower lumbar spine.  Calcifications in the pelvis likely calcified fibroids.    Impression      Moderate degenerative changes of the hips.  Severe degenerative changes of the visualized portion of the lumbar spine.  No acute bony abnormalities.      MRI cervical 3/2017  There is mild anterolisthesis C2 on C3 and mild retrolisthesis of C3 on C4.  There is also mild retrolisthesis of C6 on C7.  The spondylolisthesis is likely secondary to ligamentous laxity.  There is straightening of normal cervical lordosis. Vertebral  body heights are well maintained without evidence of fracture or marrow signal abnormality suspicious for an infiltrative process.  There is loss of intervertebral disc space height at C5-6 and C6-7 Visualized posterior fossa, cervical cord, and upper thoracic cord demonstrate normal signal. Surrounding soft tissue structures demonstrate no significant abnormalities.      C2-3:  Posterior disc osteophyte complex and bilateral facet arthropathy.  Effacement of the anterior thecal sac, without flattening of the spinal cord.  No significant neural foraminal narrowing.     C3-4:  Posterior disc osteophyte complex and bilateral facet arthropathy, resulting in effacement of the anterior thecal sac, without flattening of the spinal cord.  There is moderate left-sided neuroforaminal narrowing.       C4-5:  Posterior disc osteophyte complex and bilateral facet arthropathy, resulting in effacement of the anterior thecal sac, without flattening of the spinal cord.  There is no significant neural foraminal narrowing.     C5-6:  Posterior disc osteophyte complex and bilateral facet arthropathy, resulting in effacement of the anterior thecal sac, without flattening of spinal cord.  No significant neural foraminal narrowing.     C6-7:  Posterior disc osteophyte complex and bilateral facet arthropathy, resulting in effacement of the anterior thecal sac, without flattening of the spinal cord.  No significant neural foraminal narrowing.     C7-T1:  No significant spinal canal stenosis.  No significant neural foraminal narrowing.  Impression         There is multilevel degenerative changes of the cervical spine, resulting in mild spinal canal stenosis and neuroforaminal narrowing as detailed above.      X-ray cervical  AP, neutral lateral, flexion lateral, and extension lateral radiographs of the cervical spine were obtained.  Note that the cervicothoracic junction is not optimally visualized.  There is 2 mm anterolisthesis of C2 on  C3 which increases to 3 mm on the flexion radiographs.  The remainder of the posterior vertebral alignment is satisfactory.  Vertebral body heights are well-maintained.  There are advanced degenerative changes identified throughout the cervical spine with disc space narrowing and anterior and posterior osteophyte formation.  There is solid bony fusion of the C5 and C6 vertebral bodies.  There is mild facet arthropathy noted throughout the cervical spine.  Atherosclerotic calcification is identified in the region of the carotid arteries bilaterally.  Impression         2 mm anterolisthesis of C2 on C3 which appears to be degenerative in etiology.  This increases to 3 mm on the flexion radiograph of the cervical spine.  Cervical spondylosis.  Atherosclerosis.    X-ray left shoulder 2015  Left shoulder: Significant acromiohumeral interval narrowing, this can be associated with a rotator cuff tear.  The humeral head demonstrate normal contour.  No osseous lesions, sclerosis or significant osteophyte formation.  The acromioclavicular joint   appears within normal limits.  The soft tissues appear normal.    Past Medical History:  No date: Anemia  No date: Arthritis  No date: Hashimoto's disease  No date: Hypertension  1/12/2016: IGT (impaired glucose tolerance)  No date: Joint pain  1/12/2016: Multinodular goiter    Past Surgical History:  No date: CATARACT EXTRACTION  9/29/2015: COLONOSCOPY N/A      Comment: Procedure: COLONOSCOPY;  Surgeon: FIDELINA Sanchez MD;  Location: [address];                 Service: Endoscopy;  Laterality: N/A;  No date: JOINT REPLACEMENT      Comment: right knee  12/31/2013: KNEE SURGERY Left      Comment: TKR  No date: left parotidectomy      Comment: mixed tumor  No date: SALIVARY GLAND SURGERY  No date: TONSILLECTOMY    Review of patient's family history indicates:    Hypertension                   Mother                    Prostate cancer                Father                       Comment: prostate cancer    Breast cancer                  Maternal Grandmother      Lupus                          Neg Hx                    Psoriasis                      Neg Hx                    Melanoma                       Neg Hx                    Colon cancer                   Neg Hx                      Social History    Marital status:              Spouse name:                       Years of education:                 Number of children:               Social History Main Topics    Smoking status: Never Smoker                                                                Smokeless tobacco: Never Used                        Alcohol use: No                 Comment: very seldom     Drug use: No              Sexual activity: No                      Comment: ,  age 52,         Current Outpatient Prescriptions:  amlodipine (NORVASC) 5 MG tablet, TAKE 1 TABLET DAILY, Disp: 90 tablet, Rfl: 2  baclofen (LIORESAL) 10 MG tablet, Take 1 tablet (10 mg total) by mouth 3 (three) times daily., Disp: 270 tablet, Rfl: 2  clopidogrel (PLAVIX) 75 mg tablet, Take 1 tablet (75 mg total) by mouth once daily., Disp: 90 tablet, Rfl: 1  deutetrabenazine (AUSTEDO) 9 mg Tab, Take 2 tablets by mouth 2 (two) times daily. Final titrating dose - needs initially titration pack from company, Disp: 120 tablet, Rfl: 11  diazePAM (VALIUM) 2 MG tablet, Take 1 tablet (2 mg total) by mouth every evening., Disp: 30 tablet, Rfl: 3  ferrous sulfate 325 (65 FE) MG EC tablet, Take 325 mg by mouth once daily. , Disp: , Rfl:   gabapentin (NEURONTIN) 300 MG capsule, Take 1-2 capsules (300-600 mg total) by mouth 3 (three) times daily., Disp: 540 capsule, Rfl: 1  hydrocodone-acetaminophen 5-325mg (NORCO) 5-325 mg per tablet, Take 1 tablet by mouth every 24 hours as needed for Pain., Disp: 30 tablet, Rfl: 0  hydroxychloroquine (PLAQUENIL) 200 mg tablet, TAKE 2 TABLETS ONCE DAILY, Disp: 180 tablet, Rfl: 1  linaclotide  (LINZESS) 145 mcg Cap capsule, Take 1 capsule (145 mcg total) by mouth once daily., Disp: 30 capsule, Rfl: 0  memantine (NAMENDA XR) 7-14-21-28 mg C24k, Follow titration instructions 7 mg x1. 14 mg x1 week. 21 mg x1 week. 28 mg x1 week., Disp: 1 each, Rfl: 0  memantine 28 mg CSpX, Take 1 capsule (28 mg total) by mouth once daily. START after titration pack completed., Disp: 30 capsule, Rfl: 3  montelukast (SINGULAIR) 10 mg tablet, TAKE 1 TABLET EVERY EVENING, Disp: 90 tablet, Rfl: 2  omega 3-dha-epa-fish oil 250-350-1,000 mg Cap, Take 1 capsule by mouth 2 (two) times daily., Disp: 180 capsule, Rfl: 3  predniSONE (DELTASONE) 5 MG tablet, TAKE 2 TABLETS ONCE DAILY, Disp: 180 tablet, Rfl: 0  sertraline (ZOLOFT) 25 MG tablet, Take 1 tablet (25 mg total) by mouth once daily., Disp: 90 tablet, Rfl: 1  thiamine 100 MG tablet, Take 1 tablet (100 mg total) by mouth once daily., Disp: , Rfl:     No current facility-administered medications for this visit.       Review of patient's allergies indicates:  No Known Allergies        Review of Systems   Constitution: Negative for weight gain and weight loss.   Cardiovascular: Negative for chest pain and dyspnea on exertion.   Respiratory: Negative for shortness of breath.    Musculoskeletal: Positive for neck pain. Negative for joint pain and joint swelling.   Gastrointestinal: Negative for abdominal pain, bowel incontinence, nausea and vomiting.   Genitourinary: Negative for bladder incontinence and urgency.   Neurological: Negative for numbness and paresthesias.         Objective:        General: Selma is well-developed, well-nourished, appears stated age, in no acute distress, alert and oriented to time, place and person.     General    Vitals reviewed.  Constitutional: She is oriented to person, place, and time. She appears well-developed and well-nourished.   HENT:   Head: Normocephalic and atraumatic.   Pulmonary/Chest: Effort normal.   Neurological: She is alert and  oriented to person, place, and time.   Psychiatric: She has a normal mood and affect. Her behavior is normal. Judgment and thought content normal.     General Musculoskeletal Exam   Gait: abnormal (on a walker)     Back (L-Spine & T-Spine) / Neck (C-Spine) Exam     Tenderness   The patient is tender to palpation of the right trapezial and left trapezial.     Neck (C-Spine) Range of Motion   Flexion:     > 40  Extension: > 40Right Lateral Bend: 15 Left Lateral Bend: 15 Right Rotation: 30 Left Rotation: 30     Spinal Sensation   Right Side Sensation  C-Spine Level: normal   L-Spine Level: normal  S-Spine Level: normal  Left Side Sensation  C-Spine Level: normal  L-Spine Level: normal  S-Spine Level: normal    Other She has no scoliosis .  Spinal Kyphosis:  Absent      Left Shoulder Exam     Range of Motion   Active abduction: 100     Tests & Signs   Rotator Cuff Painful Arc/Range: moderate      Muscle Strength   Right Upper Extremity   Biceps: 5/5/5   Deltoid:  5/5  Triceps:  5/5  Wrist extension: 5/5/5   : 4/5/5   Finger Flexors:  5/5  Left Upper Extremity  Biceps: 5/5/5   Deltoid:  5/5  Triceps:  5/5  Wrist extension: 5/5/5   :  4/5/5   Finger Flexors:  5/5  Right Lower Extremity   Hip Flexion: 5/5   Quadriceps:  5/5   Anterior tibial:  5/5/5  Left Lower Extremity   Hip Flexion: 5/5   Quadriceps:  5/5   Anterior tibial:  5/5/5     Reflexes     Left Side  Biceps:  2+  Triceps:  2+  Brachioradialis:  2+  Quadriceps:  2+  Achilles:  2+  Left Advis's Sign:  Absent  Babinski Sign:  absent    Right Side   Biceps:  2+  Triceps:  2+  Brachioradialis:  2+  Quadriceps:  2+  Achilles:  2+  Right Davis's Sign:  absent  Babinski Sign:  absent    Vascular Exam     Right Pulses        Carotid:                  2+    Left Pulses        Carotid:                  2+              Assessment:       1. Neck pain    2. Muscle spasm    3. Hemichorea    4. Spondylosis of cervical region without myelopathy or radiculopathy    5.  Isolated cervical dystonia    6. Physical deconditioning           Plan:          1.  She had procedure in may that was very helpful  2. She has not been to PT in a couple of weeks.  She has not been doing her HEP even when in PT.  We discussed needing to keep moving and start a walking program.  We discussed walking around the house every hour.  We discussed three walks outside with her daughter  3.  Baclofen 10mg po TID, she is taking 5-10 at night.    4.  gabapentin to 300 mg po TID, was changes to gabapentin 300 at night after hospital  5.  She is not having as much hand and arm shaking on the left.  She feels like the chorea is better.  Neck pain has been better since procedure in may with Dr. jaramillo  6.   RTC 3 months      Follow-up: Follow up in about 3 months (around 10/26/2019). If there are any questions prior to this, the patient was instructed to contact the office.

## 2019-07-26 ENCOUNTER — OFFICE VISIT (OUTPATIENT)
Dept: SPINE | Facility: CLINIC | Age: 84
End: 2019-07-26
Attending: PHYSICAL MEDICINE & REHABILITATION
Payer: MEDICARE

## 2019-07-26 VITALS
HEIGHT: 64 IN | SYSTOLIC BLOOD PRESSURE: 126 MMHG | HEART RATE: 90 BPM | TEMPERATURE: 98 F | DIASTOLIC BLOOD PRESSURE: 65 MMHG | BODY MASS INDEX: 28 KG/M2 | WEIGHT: 164 LBS

## 2019-07-26 DIAGNOSIS — M47.812 SPONDYLOSIS OF CERVICAL REGION WITHOUT MYELOPATHY OR RADICULOPATHY: ICD-10-CM

## 2019-07-26 DIAGNOSIS — M54.2 NECK PAIN: Primary | ICD-10-CM

## 2019-07-26 DIAGNOSIS — R53.81 PHYSICAL DECONDITIONING: ICD-10-CM

## 2019-07-26 DIAGNOSIS — M62.838 MUSCLE SPASM: ICD-10-CM

## 2019-07-26 DIAGNOSIS — G24.3 ISOLATED CERVICAL DYSTONIA: ICD-10-CM

## 2019-07-26 DIAGNOSIS — G25.5 HEMICHOREA: ICD-10-CM

## 2019-07-26 PROCEDURE — 99999 PR PBB SHADOW E&M-EST. PATIENT-LVL III: CPT | Mod: PBBFAC,,, | Performed by: PHYSICAL MEDICINE & REHABILITATION

## 2019-07-26 PROCEDURE — 99214 PR OFFICE/OUTPT VISIT, EST, LEVL IV, 30-39 MIN: ICD-10-PCS | Mod: S$PBB,,, | Performed by: PHYSICAL MEDICINE & REHABILITATION

## 2019-07-26 PROCEDURE — 99214 OFFICE O/P EST MOD 30 MIN: CPT | Mod: S$PBB,,, | Performed by: PHYSICAL MEDICINE & REHABILITATION

## 2019-07-26 PROCEDURE — 99213 OFFICE O/P EST LOW 20 MIN: CPT | Mod: PBBFAC | Performed by: PHYSICAL MEDICINE & REHABILITATION

## 2019-07-26 PROCEDURE — 99999 PR PBB SHADOW E&M-EST. PATIENT-LVL III: ICD-10-PCS | Mod: PBBFAC,,, | Performed by: PHYSICAL MEDICINE & REHABILITATION

## 2019-07-29 ENCOUNTER — PROCEDURE VISIT (OUTPATIENT)
Dept: NEUROLOGY | Facility: CLINIC | Age: 84
End: 2019-07-29
Payer: MEDICARE

## 2019-07-29 ENCOUNTER — TELEPHONE (OUTPATIENT)
Dept: NEUROLOGY | Facility: CLINIC | Age: 84
End: 2019-07-29

## 2019-07-29 VITALS
BODY MASS INDEX: 30.53 KG/M2 | WEIGHT: 178.81 LBS | HEIGHT: 64 IN | SYSTOLIC BLOOD PRESSURE: 132 MMHG | DIASTOLIC BLOOD PRESSURE: 79 MMHG | HEART RATE: 100 BPM

## 2019-07-29 DIAGNOSIS — G24.9 DYSTONIA: ICD-10-CM

## 2019-07-29 PROCEDURE — 64616 CHEMODENERV MUSC NECK DYSTON: CPT | Mod: 50,S$PBB,, | Performed by: PSYCHIATRY & NEUROLOGY

## 2019-07-29 PROCEDURE — 64616 CHEMODENERV MUSC NECK DYSTON: CPT | Mod: 50,PBBFAC | Performed by: PSYCHIATRY & NEUROLOGY

## 2019-07-29 PROCEDURE — 64616 PR CHEMODENERVATION NECK MUSCLES EXC LARNYNX, UNI: ICD-10-PCS | Mod: 50,S$PBB,, | Performed by: PSYCHIATRY & NEUROLOGY

## 2019-07-29 RX ORDER — DIAZEPAM 2 MG/1
2 TABLET ORAL NIGHTLY PRN
Qty: 90 TABLET | Refills: 1 | Status: ON HOLD | OUTPATIENT
Start: 2019-07-29 | End: 2019-08-30 | Stop reason: HOSPADM

## 2019-07-29 RX ADMIN — ONABOTULINUMTOXINA 200 UNITS: 100 INJECTION, POWDER, LYOPHILIZED, FOR SOLUTION INTRADERMAL; INTRAMUSCULAR at 11:07

## 2019-07-29 NOTE — TELEPHONE ENCOUNTER
Call placed to patient daughter Susu. Asked if patient could come in today for Botox with Dr. Giang at 2:40PM. Patient and daughter agreed, will come in for Botox at 2:40 PM with Dr. Giang.

## 2019-07-29 NOTE — PROCEDURES
"Procedures   BOTULINUM TOXIN PROCEDURE NOTE FOR CERVICAL DYSTONIA/ SPASMODIC TORTICOLLIS    Diagnosis: CERVICAL DYSTONIA/ SPASMODIC TORTICOLLIS  She has recurrence of pain, spasm and abnormal posturing over the past several weeks. She is experiencing a worsening of cervical dystonia/ spasmodic torticollis which interferes with activities of daily living (ADL).     I last injected Botox in November, unclear benefit at the time, then had posterior nuchal nerve blacks done by Dr. River with good results.  Now, "pulling" has returned, predominantly to the left neck - improves with pressure.    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  Given the focal nature of increased tone and poor response to oral medications, She is a good candidate for botulinum injection to her neck.  She will need 200 units of toxin    The goal of the injections will be to reduce pain and abnormal posturing.  The procedure was explained to patient and a consent form was signed.      BOTOX INJECTION PROCEDURE:    Using a 30 gauge injection needle and aseptic technique the following muscles were identified and injected with botox .     Dilution: 100:1      For cervical dystonia/ spasmodic torticollis:      RIGHT    Muscle group Units given # injection sites   Splenius capitus      Levator scapulae     Upper trapezius     Sternocleidomastoid      Scalenus anticus      Scalenus medius     Scalenus posterior     Longissimus capitus      rhomboid     Semispinalis 25                   LEFT        Muscle group Units given # injection sites   Splenius capitus      Levator scapulae     Upper trapezius     Sternocleidomastoid  25    Scalenus anticus      Scalenus medius     Scalenus posterior     Longissimus capitus      rhomboid     Semispinalis 25            Total amount of botox used:  75 units  Total amount of botox wasted:  25 units    Patient tolerated the procedure without any difficulty and " there were no complications.

## 2019-07-30 NOTE — TELEPHONE ENCOUNTER
DOCUMENTATION ONLY:   Prior authorization is not required for mycophenolate  Estimated Co-Pay: $7.00  [facility] is [facility]. Patient must fill with [facility].

## 2019-07-31 ENCOUNTER — TELEPHONE (OUTPATIENT)
Dept: RHEUMATOLOGY | Facility: CLINIC | Age: 84
End: 2019-07-31

## 2019-07-31 DIAGNOSIS — J84.116 CRYPTOGENIC ORGANIZING PNEUMONIA: ICD-10-CM

## 2019-07-31 RX ORDER — MYCOPHENOLATE MOFETIL 500 MG/1
500 TABLET ORAL 2 TIMES DAILY
Qty: 180 TABLET | Refills: 0 | Status: SHIPPED | OUTPATIENT
Start: 2019-07-31 | End: 2019-11-13

## 2019-07-31 NOTE — TELEPHONE ENCOUNTER
----- Message from Sherrell Villatoro sent at 7/30/2019 10:57 AM CDT -----  Regarding: Mycophenolate Transfer   FYI:  Mycophenolate does not require prior authorization. Patient's insurance requires the patient to fill through  [facility]. Please send prescription to [facility], which has been added to the patients EPIC profile. Patient has been notified and provided with the necessary info to call and schedule a delivery.    #Please note: [facility] prefers 90 day supply prescriptions. Patient's co-pay will be $7.00 if filled for 30 or 90 day supply.#    To complete this in EPIC, the original order MUST be discontinued and re-typed as a new prescription with the updated pharmacy listed. Clicking reorder will continue to route the rx to [facility] even if the pharmacy is changed. Please opt the patient out of [facility] when the BPA is fired.      Thank you,   Sherrell Villatoro  Patient Care Advocate  [facility]   P: [phone] or [phone]

## 2019-08-05 ENCOUNTER — PATIENT MESSAGE (OUTPATIENT)
Dept: SPINE | Facility: CLINIC | Age: 84
End: 2019-08-05

## 2019-08-05 ENCOUNTER — TELEPHONE (OUTPATIENT)
Dept: INTERNAL MEDICINE | Facility: CLINIC | Age: 84
End: 2019-08-05

## 2019-08-05 RX ORDER — AMLODIPINE BESYLATE 5 MG/1
5 TABLET ORAL DAILY
Qty: 90 TABLET | Refills: 0 | Status: CANCELLED | OUTPATIENT
Start: 2019-08-05

## 2019-08-05 RX ORDER — AMLODIPINE BESYLATE 5 MG/1
5 TABLET ORAL DAILY
Qty: 90 TABLET | Refills: 1 | Status: SHIPPED | OUTPATIENT
Start: 2019-08-05 | End: 2019-08-05 | Stop reason: SDUPTHER

## 2019-08-05 RX ORDER — GABAPENTIN 300 MG/1
300 CAPSULE ORAL NIGHTLY
Qty: 90 CAPSULE | Refills: 3 | Status: ON HOLD | OUTPATIENT
Start: 2019-08-05 | End: 2019-08-30 | Stop reason: HOSPADM

## 2019-08-05 RX ORDER — AMLODIPINE BESYLATE 5 MG/1
5 TABLET ORAL DAILY
Qty: 14 TABLET | Refills: 1 | Status: ON HOLD | OUTPATIENT
Start: 2019-08-05 | End: 2019-08-28 | Stop reason: SDUPTHER

## 2019-08-15 ENCOUNTER — PES CALL (OUTPATIENT)
Dept: ADMINISTRATIVE | Facility: CLINIC | Age: 84
End: 2019-08-15

## 2019-08-19 ENCOUNTER — TELEPHONE (OUTPATIENT)
Dept: INTERNAL MEDICINE | Facility: CLINIC | Age: 84
End: 2019-08-19

## 2019-08-19 ENCOUNTER — PATIENT MESSAGE (OUTPATIENT)
Dept: RHEUMATOLOGY | Facility: CLINIC | Age: 84
End: 2019-08-19

## 2019-08-19 ENCOUNTER — PATIENT MESSAGE (OUTPATIENT)
Dept: INTERNAL MEDICINE | Facility: CLINIC | Age: 84
End: 2019-08-19

## 2019-08-19 NOTE — TELEPHONE ENCOUNTER
Dr. Lee, the family is trying an adult day care program for this patient I am leaving the forms on your desk, please let your staff know when they are completed and thank you.

## 2019-08-26 ENCOUNTER — OFFICE VISIT (OUTPATIENT)
Dept: PULMONOLOGY | Facility: CLINIC | Age: 84
End: 2019-08-26
Payer: MEDICARE

## 2019-08-26 ENCOUNTER — HOSPITAL ENCOUNTER (INPATIENT)
Facility: HOSPITAL | Age: 84
LOS: 6 days | Discharge: NURSING, REHAB OR LONG-TERM CARE | DRG: 917 | End: 2019-09-05
Attending: EMERGENCY MEDICINE | Admitting: PSYCHIATRY & NEUROLOGY
Payer: MEDICARE

## 2019-08-26 VITALS
WEIGHT: 177.69 LBS | HEART RATE: 87 BPM | DIASTOLIC BLOOD PRESSURE: 88 MMHG | OXYGEN SATURATION: 94 % | BODY MASS INDEX: 30.34 KG/M2 | HEIGHT: 64 IN | SYSTOLIC BLOOD PRESSURE: 148 MMHG

## 2019-08-26 DIAGNOSIS — R41.0 DELIRIUM: ICD-10-CM

## 2019-08-26 DIAGNOSIS — I48.0 AF (PAROXYSMAL ATRIAL FIBRILLATION): ICD-10-CM

## 2019-08-26 DIAGNOSIS — E63.9 POOR NUTRITION: ICD-10-CM

## 2019-08-26 DIAGNOSIS — T88.7XXA MEDICATION SIDE EFFECT: ICD-10-CM

## 2019-08-26 DIAGNOSIS — G24.9 DYSTONIA: ICD-10-CM

## 2019-08-26 DIAGNOSIS — I10 ESSENTIAL HYPERTENSION: ICD-10-CM

## 2019-08-26 DIAGNOSIS — I70.0 AORTIC ATHEROSCLEROSIS: ICD-10-CM

## 2019-08-26 DIAGNOSIS — M05.79 SEROPOSITIVE RHEUMATOID ARTHRITIS OF MULTIPLE SITES: ICD-10-CM

## 2019-08-26 DIAGNOSIS — H35.012 RETINAL MACROANEURYSM OF LEFT EYE: ICD-10-CM

## 2019-08-26 DIAGNOSIS — R94.31 LONG QT INTERVAL: ICD-10-CM

## 2019-08-26 DIAGNOSIS — F01.50 VASCULAR DEMENTIA WITHOUT BEHAVIORAL DISTURBANCE: ICD-10-CM

## 2019-08-26 DIAGNOSIS — E55.9 HYPOVITAMINOSIS D: ICD-10-CM

## 2019-08-26 DIAGNOSIS — E04.1 THYROID NODULE: ICD-10-CM

## 2019-08-26 DIAGNOSIS — D50.8 IRON DEFICIENCY ANEMIA SECONDARY TO INADEQUATE DIETARY IRON INTAKE: ICD-10-CM

## 2019-08-26 DIAGNOSIS — D72.829 LEUKOCYTOSIS, UNSPECIFIED TYPE: ICD-10-CM

## 2019-08-26 DIAGNOSIS — N17.9 AKI (ACUTE KIDNEY INJURY): ICD-10-CM

## 2019-08-26 DIAGNOSIS — I27.23 PULMONARY HYPERTENSION DUE TO INTERSTITIAL LUNG DISEASE: ICD-10-CM

## 2019-08-26 DIAGNOSIS — R41.82 ALTERED MENTAL STATUS: ICD-10-CM

## 2019-08-26 DIAGNOSIS — J84.116 CRYPTOGENIC ORGANIZING PNEUMONIA: ICD-10-CM

## 2019-08-26 DIAGNOSIS — J18.9 COMMUNITY ACQUIRED PNEUMONIA, UNSPECIFIED LATERALITY: ICD-10-CM

## 2019-08-26 DIAGNOSIS — R13.12 OROPHARYNGEAL DYSPHAGIA: ICD-10-CM

## 2019-08-26 DIAGNOSIS — N18.30 CHRONIC RENAL FAILURE SYNDROME, STAGE 3 (MODERATE): ICD-10-CM

## 2019-08-26 DIAGNOSIS — G93.40 ACUTE ENCEPHALOPATHY: Primary | ICD-10-CM

## 2019-08-26 DIAGNOSIS — Z86.73 HISTORY OF STROKE: ICD-10-CM

## 2019-08-26 DIAGNOSIS — D25.0 SUBMUCOUS LEIOMYOMA OF UTERUS: ICD-10-CM

## 2019-08-26 DIAGNOSIS — G45.9 TIA (TRANSIENT ISCHEMIC ATTACK): ICD-10-CM

## 2019-08-26 DIAGNOSIS — R06.00 DYSPNEA: ICD-10-CM

## 2019-08-26 DIAGNOSIS — K27.9 PUD (PEPTIC ULCER DISEASE): ICD-10-CM

## 2019-08-26 DIAGNOSIS — J84.9 PULMONARY HYPERTENSION DUE TO INTERSTITIAL LUNG DISEASE: ICD-10-CM

## 2019-08-26 DIAGNOSIS — H35.352 CYSTOID MACULAR EDEMA, LEFT EYE: ICD-10-CM

## 2019-08-26 PROCEDURE — 84443 ASSAY THYROID STIM HORMONE: CPT

## 2019-08-26 PROCEDURE — 99285 EMERGENCY DEPT VISIT HI MDM: CPT | Mod: ,,, | Performed by: EMERGENCY MEDICINE

## 2019-08-26 PROCEDURE — 99285 PR EMERGENCY DEPT VISIT,LEVEL V: ICD-10-PCS | Mod: ,,, | Performed by: EMERGENCY MEDICINE

## 2019-08-26 PROCEDURE — 84484 ASSAY OF TROPONIN QUANT: CPT

## 2019-08-26 PROCEDURE — 93010 EKG 12-LEAD: ICD-10-PCS | Mod: ,,, | Performed by: INTERNAL MEDICINE

## 2019-08-26 PROCEDURE — 93005 ELECTROCARDIOGRAM TRACING: CPT

## 2019-08-26 PROCEDURE — 99285 EMERGENCY DEPT VISIT HI MDM: CPT | Mod: 25

## 2019-08-26 PROCEDURE — 99999 PR PBB SHADOW E&M-EST. PATIENT-LVL V: CPT | Mod: PBBFAC,,, | Performed by: INTERNAL MEDICINE

## 2019-08-26 PROCEDURE — 99213 PR OFFICE/OUTPT VISIT, EST, LEVL III, 20-29 MIN: ICD-10-PCS | Mod: S$PBB,GC,, | Performed by: INTERNAL MEDICINE

## 2019-08-26 PROCEDURE — 99215 OFFICE O/P EST HI 40 MIN: CPT | Mod: PBBFAC,25,27 | Performed by: INTERNAL MEDICINE

## 2019-08-26 PROCEDURE — 83880 ASSAY OF NATRIURETIC PEPTIDE: CPT

## 2019-08-26 PROCEDURE — 80061 LIPID PANEL: CPT

## 2019-08-26 PROCEDURE — 80047 BASIC METABLC PNL IONIZED CA: CPT

## 2019-08-26 PROCEDURE — 99999 PR PBB SHADOW E&M-EST. PATIENT-LVL V: ICD-10-PCS | Mod: PBBFAC,,, | Performed by: INTERNAL MEDICINE

## 2019-08-26 PROCEDURE — 80053 COMPREHEN METABOLIC PANEL: CPT

## 2019-08-26 PROCEDURE — 93010 ELECTROCARDIOGRAM REPORT: CPT | Mod: ,,, | Performed by: INTERNAL MEDICINE

## 2019-08-26 PROCEDURE — 99213 OFFICE O/P EST LOW 20 MIN: CPT | Mod: S$PBB,GC,, | Performed by: INTERNAL MEDICINE

## 2019-08-26 RX ORDER — OMEPRAZOLE 20 MG/1
20 CAPSULE, DELAYED RELEASE ORAL DAILY
Qty: 90 CAPSULE | Refills: 0 | Status: SHIPPED | OUTPATIENT
Start: 2019-08-26 | End: 2019-11-12 | Stop reason: SDUPTHER

## 2019-08-26 RX ORDER — PREDNISONE 1 MG/1
1 TABLET ORAL DAILY
Qty: 120 TABLET | Refills: 0 | Status: ON HOLD | OUTPATIENT
Start: 2019-08-26 | End: 2019-08-30 | Stop reason: HOSPADM

## 2019-08-26 RX ORDER — FUROSEMIDE 40 MG/1
TABLET ORAL
Status: ON HOLD | COMMUNITY
Start: 2019-08-21 | End: 2019-08-30 | Stop reason: HOSPADM

## 2019-08-26 RX ORDER — MONTELUKAST SODIUM 10 MG/1
10 TABLET ORAL NIGHTLY
Qty: 90 TABLET | Refills: 0 | Status: SHIPPED | OUTPATIENT
Start: 2019-08-26 | End: 2019-10-17 | Stop reason: SDUPTHER

## 2019-08-26 NOTE — PROGRESS NOTES
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 She is accompanied by her daughter. Daughter states that the patient has become more short of breath with performing ADLs such as bathing or dressing herself. Complains of more fatigue with light activities and sleeping more. She is in the process of getting approved for cellcept for her RA and is on 25 mg of prednisone daily with difficulty weaning down to 20 mg without symptoms. She has some lower extremity edema but denies palpitations or chest pain. No sputum production. + weight gain to 20 lbs over the past several months.     Review of Systems   Constitutional: Positive for weight gain, activity change and fatigue.   HENT: Negative for rhinorrhea, sinus pressure and sore throat.    Respiratory: Positive for orthopnea, dyspnea on extertion and somnolence. Negative for cough, sputum production and wheezing.    Cardiovascular: Negative for chest pain and leg swelling.   Skin: Negative for rash.   Gastrointestinal: Negative for nausea and abdominal pain.       Objective:      Physical Exam   Constitutional: She is oriented to person, place, and time. She appears well-developed and well-nourished.   HENT:   Head: Normocephalic.   Right Ear: External ear normal.   Left Ear: External ear normal.   Cushingoid facies   Neck: Normal range of motion. Neck supple.   Cardiovascular: Normal rate, regular rhythm and normal heart sounds.   Pulmonary/Chest: Normal expansion, symmetric chest wall expansion and effort normal.   Abdominal: Soft. Bowel sounds are normal. There is no tenderness.   Musculoskeletal: Normal range of motion. She exhibits edema.   Neurological: She is alert and oriented to person, place, and time.   Skin: Skin is warm and dry.   Psychiatric: She has a normal mood and affect.   Nursing  note and vitals reviewed.    Personal Diagnostic Review  none pertinent  No flowsheet data found.      Assessment:     Problem List Items Addressed This Visit        Pulmonary    Cryptogenic organizing pneumonia    Overview     Clinically suspected due to recurring infiltrates on CXR in the setting of rheumatoid arthritis.  Work up for infection has been negative and the clinical picture is less consistent with acute infection.  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    · Slowly wean prednisone as tolerated.  · Now on sulfasalazine as per Dr. Rouse (Rheumatology).  It is probably reasonable to avoid biologics unless absolutely necessary given prior flare after infliximab.  · Repeat CXR from 12/25/2018 shows continued clearing.  · Consider repeat chest CT (non-contrast) once prednisone is at stable lowest dose possible.         Current Assessment & Plan     - Clinical suspected due to recurring infiltrates on CXR in the setting of RA  - Continues to have episodic shortness of breath, now with ADLs  - Needs to slowly wean prednisone as she is starting cellcept per rheumatology. Daughter asked for 1 mg increments, I will oblige and prescribe.  - Consider cardiac evaluation for untreated diastolic dysfunction or dysarrhythmias that are limiting her exercise tolerance.                No diagnosis found.    Outpatient Encounter Medications as of 8/26/2019   Medication Sig Dispense Refill    acetaminophen (TYLENOL) 500 MG tablet Take 1,000 mg by mouth daily as needed for Pain.      albuterol (PROVENTIL/VENTOLIN HFA) 90 mcg/actuation inhaler Inhale 2 puffs into the lungs every 6 (six) hours as needed for Wheezing. Rescue 1 Inhaler 0    amLODIPine (NORVASC) 5 MG tablet Take 1 tablet (5 mg total) by mouth once daily. 14 tablet 1    aspirin (ECOTRIN) 81 MG EC tablet Take 81 mg by  mouth once daily.      baclofen (LIORESAL) 10 MG tablet Take 1 tablet (10 mg total) by mouth 3 (three) times daily. 90 tablet 2    carvedilol (COREG) 12.5 MG tablet Take 1 tablet (12.5 mg total) by mouth 2 (two) times daily with meals. 180 tablet 3    clopidogrel (PLAVIX) 75 mg tablet Take 1 tablet (75 mg total) by mouth once daily. 90 tablet 1    diazePAM (VALIUM) 2 MG tablet Take 1 tablet (2 mg total) by mouth nightly as needed for Anxiety. 90 tablet 1    furosemide (LASIX) 20 MG tablet TAKE 1 TABLET BY MOUTH EVERY DAY AT 6 AM 30 tablet 0    furosemide (LASIX) 40 MG tablet       gabapentin (NEURONTIN) 300 MG capsule Take 1 capsule (300 mg total) by mouth every evening. 90 capsule 3    hydroxychloroquine (PLAQUENIL) 200 mg tablet Take 1 tablet (200 mg total) by mouth 2 (two) times daily. 60 tablet 2    linaclotide (LINZESS) 145 mcg Cap capsule Take 1 capsule (145 mcg total) by mouth once daily. 30 capsule 0    magnesium oxide (MAG-OX) 400 mg (241.3 mg magnesium) tablet Take 1 tablet (400 mg total) by mouth once daily. 90 tablet 0    montelukast (SINGULAIR) 10 mg tablet Take 1 tablet (10 mg total) by mouth every evening. 90 tablet 0    mycophenolate (CELLCEPT) 500 mg Tab Take 1 tablet (500 mg total) by mouth 2 (two) times daily. 180 tablet 0    omeprazole (PRILOSEC) 20 MG capsule Take 1 capsule (20 mg total) by mouth once daily. 90 capsule 0    potassium chloride SA (K-DUR,KLOR-CON) 10 MEQ tablet Take 2 tablets (20 mEq total) by mouth once daily. 180 tablet 0    predniSONE (DELTASONE) 10 MG tablet Take 20 mg by mouth once daily.      sulfaSALAzine (AZULFIDINE) 500 MG TbEC Take 2 tablets (1,000 mg total) by mouth 2 (two) times daily. 120 tablet 2    traMADol (ULTRAM) 50 mg tablet Take 1-2 tablets ( mg total) by mouth every 24 hours as needed for Pain. 40 tablet 0    predniSONE (DELTASONE) 1 MG tablet Take 1 tablet (1 mg total) by mouth once daily. 120 tablet 0     Facility-Administered  Encounter Medications as of 8/26/2019   Medication Dose Route Frequency Provider Last Rate Last Dose    onabotulinumtoxina injection 200 Units  200 Units Intramuscular Q90 Days Baldomero Giang MD   200 Units at 07/29/19 7260     No orders of the defined types were placed in this encounter.      Plan:              RTC in 3 months

## 2019-08-26 NOTE — ASSESSMENT & PLAN NOTE
- Clinical suspected due to recurring infiltrates on CXR in the setting of RA  - Continues to have episodic shortness of breath, now with ADLs  - Needs to slowly wean prednisone as she is starting cellcept per rheumatology. Daughter asked for 1 mg increments, I will oblige and prescribe.  - Consider cardiac evaluation for untreated diastolic dysfunction or dysarrhythmias that are limiting her exercise tolerance.

## 2019-08-27 PROBLEM — G45.9 TIA (TRANSIENT ISCHEMIC ATTACK): Status: ACTIVE | Noted: 2019-08-27

## 2019-08-27 LAB
ALBUMIN SERPL BCP-MCNC: 3.9 G/DL (ref 3.5–5.2)
ALLENS TEST: ABNORMAL
ALP SERPL-CCNC: 49 U/L (ref 55–135)
ALT SERPL W/O P-5'-P-CCNC: 11 U/L (ref 10–44)
ANION GAP SERPL CALC-SCNC: 10 MMOL/L (ref 8–16)
AST SERPL-CCNC: 20 U/L (ref 10–40)
BASOPHILS # BLD AUTO: 0.03 K/UL (ref 0–0.2)
BASOPHILS NFR BLD: 0.4 % (ref 0–1.9)
BILIRUB SERPL-MCNC: 0.4 MG/DL (ref 0.1–1)
BILIRUB UR QL STRIP: NEGATIVE
BNP SERPL-MCNC: 70 PG/ML (ref 0–99)
BSA FOR ECHO PROCEDURE: 1.92 M2
BUN SERPL-MCNC: 17 MG/DL (ref 6–30)
BUN SERPL-MCNC: 18 MG/DL (ref 8–23)
CALCIUM SERPL-MCNC: 9.4 MG/DL (ref 8.7–10.5)
CHLORIDE SERPL-SCNC: 96 MMOL/L (ref 95–110)
CHLORIDE SERPL-SCNC: 98 MMOL/L (ref 95–110)
CHOLEST SERPL-MCNC: 197 MG/DL (ref 120–199)
CHOLEST/HDLC SERPL: 2.2 {RATIO} (ref 2–5)
CLARITY UR REFRACT.AUTO: CLEAR
CO2 SERPL-SCNC: 25 MMOL/L (ref 23–29)
COLOR UR AUTO: YELLOW
CREAT SERPL-MCNC: 0.9 MG/DL (ref 0.5–1.4)
CREAT SERPL-MCNC: 1.1 MG/DL (ref 0.5–1.4)
CV ECHO LV RWT: 0.5 CM
DIFFERENTIAL METHOD: ABNORMAL
ECHO LV POSTERIOR WALL: 1.1 CM (ref 0.6–1.1)
EOSINOPHIL # BLD AUTO: 0.1 K/UL (ref 0–0.5)
EOSINOPHIL NFR BLD: 1.5 % (ref 0–8)
ERYTHROCYTE [DISTWIDTH] IN BLOOD BY AUTOMATED COUNT: 13.7 % (ref 11.5–14.5)
EST. GFR  (AFRICAN AMERICAN): 54.4 ML/MIN/1.73 M^2
EST. GFR  (NON AFRICAN AMERICAN): 47.2 ML/MIN/1.73 M^2
ESTIMATED AVG GLUCOSE: 114 MG/DL (ref 68–131)
FRACTIONAL SHORTENING: 23 % (ref 28–44)
GLUCOSE SERPL-MCNC: 103 MG/DL (ref 70–110)
GLUCOSE SERPL-MCNC: 94 MG/DL (ref 70–110)
GLUCOSE UR QL STRIP: NEGATIVE
HBA1C MFR BLD HPLC: 5.6 % (ref 4–5.6)
HCO3 UR-SCNC: 24 MMOL/L (ref 24–28)
HCT VFR BLD AUTO: 39.4 % (ref 37–48.5)
HCT VFR BLD CALC: 36 %PCV (ref 36–54)
HDLC SERPL-MCNC: 90 MG/DL (ref 40–75)
HDLC SERPL: 45.7 % (ref 20–50)
HGB BLD-MCNC: 12 G/DL (ref 12–16)
HGB UR QL STRIP: NEGATIVE
IMM GRANULOCYTES # BLD AUTO: 0.06 K/UL (ref 0–0.04)
IMM GRANULOCYTES NFR BLD AUTO: 0.7 % (ref 0–0.5)
INTERVENTRICULAR SEPTUM: 1.5 CM (ref 0.6–1.1)
KETONES UR QL STRIP: NEGATIVE
LA MAJOR: 5.8 CM
LA MINOR: 5.8 CM
LA WIDTH: 4 CM
LDLC SERPL CALC-MCNC: 91.6 MG/DL (ref 63–159)
LEFT ATRIUM SIZE: 4 CM
LEFT ATRIUM VOLUME INDEX: 42 ML/M2
LEFT ATRIUM VOLUME: 78.88 CM3
LEFT INTERNAL DIMENSION IN SYSTOLE: 3.4 CM (ref 2.1–4)
LEFT VENTRICLE MASS INDEX: 115 G/M2
LEFT VENTRICULAR INTERNAL DIMENSION IN DIASTOLE: 4.4 CM (ref 3.5–6)
LEFT VENTRICULAR MASS: 215.1 G
LEUKOCYTE ESTERASE UR QL STRIP: NEGATIVE
LYMPHOCYTES # BLD AUTO: 2.2 K/UL (ref 1–4.8)
LYMPHOCYTES NFR BLD: 25.6 % (ref 18–48)
MCH RBC QN AUTO: 27.2 PG (ref 27–31)
MCHC RBC AUTO-ENTMCNC: 30.5 G/DL (ref 32–36)
MCV RBC AUTO: 89 FL (ref 82–98)
MONOCYTES # BLD AUTO: 1.2 K/UL (ref 0.3–1)
MONOCYTES NFR BLD: 14.7 % (ref 4–15)
NEUTROPHILS # BLD AUTO: 4.8 K/UL (ref 1.8–7.7)
NEUTROPHILS NFR BLD: 57.1 % (ref 38–73)
NITRITE UR QL STRIP: NEGATIVE
NONHDLC SERPL-MCNC: 107 MG/DL
NRBC BLD-RTO: 0 /100 WBC
PCO2 BLDA: 34.3 MMHG (ref 35–45)
PH SMN: 7.45 [PH] (ref 7.35–7.45)
PH UR STRIP: 6 [PH] (ref 5–8)
PLATELET # BLD AUTO: 180 K/UL (ref 150–350)
PMV BLD AUTO: 9.4 FL (ref 9.2–12.9)
PO2 BLDA: 177 MMHG (ref 40–60)
POC BE: 0 MMOL/L
POC IONIZED CALCIUM: 0.96 MMOL/L (ref 1.06–1.42)
POC SATURATED O2: 100 % (ref 95–100)
POC TCO2 (MEASURED): 24 MMOL/L (ref 23–29)
POC TCO2: 25 MMOL/L (ref 24–29)
POTASSIUM BLD-SCNC: 4.4 MMOL/L (ref 3.5–5.1)
POTASSIUM SERPL-SCNC: 5.5 MMOL/L (ref 3.5–5.1)
PROT SERPL-MCNC: 6.9 G/DL (ref 6–8.4)
PROT UR QL STRIP: NEGATIVE
RA PRESSURE: 8 MMHG
RBC # BLD AUTO: 4.41 M/UL (ref 4–5.4)
RIGHT VENTRICULAR END-DIASTOLIC DIMENSION: 4 CM
SAMPLE: ABNORMAL
SAMPLE: ABNORMAL
SINUS: 2.8 CM
SITE: ABNORMAL
SODIUM BLD-SCNC: 132 MMOL/L (ref 136–145)
SODIUM SERPL-SCNC: 131 MMOL/L (ref 136–145)
SP GR UR STRIP: 1.01 (ref 1–1.03)
STJ: 2.5 CM
TRICUSPID ANNULAR PLANE SYSTOLIC EXCURSION: 1.7 CM
TRIGL SERPL-MCNC: 77 MG/DL (ref 30–150)
TROPONIN I SERPL DL<=0.01 NG/ML-MCNC: 0.01 NG/ML (ref 0–0.03)
TSH SERPL DL<=0.005 MIU/L-ACNC: 0.9 UIU/ML (ref 0.4–4)
URN SPEC COLLECT METH UR: NORMAL
WBC # BLD AUTO: 8.41 K/UL (ref 3.9–12.7)

## 2019-08-27 PROCEDURE — 97165 OT EVAL LOW COMPLEX 30 MIN: CPT

## 2019-08-27 PROCEDURE — 99220 PR INITIAL OBSERVATION CARE,LEVL III: ICD-10-PCS | Mod: ,,, | Performed by: PSYCHIATRY & NEUROLOGY

## 2019-08-27 PROCEDURE — 95951 PR EEG MONITORING/VIDEORECORD: ICD-10-PCS | Mod: 26,,, | Performed by: PSYCHIATRY & NEUROLOGY

## 2019-08-27 PROCEDURE — 63600175 PHARM REV CODE 636 W HCPCS: Performed by: NURSE PRACTITIONER

## 2019-08-27 PROCEDURE — 92610 EVALUATE SWALLOWING FUNCTION: CPT

## 2019-08-27 PROCEDURE — 97802 MEDICAL NUTRITION INDIV IN: CPT

## 2019-08-27 PROCEDURE — 85025 COMPLETE CBC W/AUTO DIFF WBC: CPT

## 2019-08-27 PROCEDURE — 95951 PR EEG MONITORING/VIDEORECORD: CPT | Mod: 26,,, | Performed by: PSYCHIATRY & NEUROLOGY

## 2019-08-27 PROCEDURE — 92523 SPEECH SOUND LANG COMPREHEN: CPT

## 2019-08-27 PROCEDURE — 95951 HC EEG MONITORING/VIDEO RECORD: CPT

## 2019-08-27 PROCEDURE — G0378 HOSPITAL OBSERVATION PER HR: HCPCS

## 2019-08-27 PROCEDURE — 99900035 HC TECH TIME PER 15 MIN (STAT)

## 2019-08-27 PROCEDURE — 25000003 PHARM REV CODE 250: Performed by: PHYSICIAN ASSISTANT

## 2019-08-27 PROCEDURE — 25000003 PHARM REV CODE 250: Performed by: NURSE PRACTITIONER

## 2019-08-27 PROCEDURE — 83036 HEMOGLOBIN GLYCOSYLATED A1C: CPT

## 2019-08-27 PROCEDURE — 99220 PR INITIAL OBSERVATION CARE,LEVL III: CPT | Mod: ,,, | Performed by: PSYCHIATRY & NEUROLOGY

## 2019-08-27 PROCEDURE — 97162 PT EVAL MOD COMPLEX 30 MIN: CPT

## 2019-08-27 PROCEDURE — 81003 URINALYSIS AUTO W/O SCOPE: CPT

## 2019-08-27 RX ORDER — HEPARIN SODIUM 5000 [USP'U]/ML
5000 INJECTION, SOLUTION INTRAVENOUS; SUBCUTANEOUS EVERY 8 HOURS
Status: DISCONTINUED | OUTPATIENT
Start: 2019-08-27 | End: 2019-09-05 | Stop reason: HOSPADM

## 2019-08-27 RX ORDER — ATORVASTATIN CALCIUM 20 MG/1
40 TABLET, FILM COATED ORAL DAILY
Status: DISCONTINUED | OUTPATIENT
Start: 2019-08-27 | End: 2019-09-05 | Stop reason: HOSPADM

## 2019-08-27 RX ORDER — PANTOPRAZOLE SODIUM 40 MG/1
40 TABLET, DELAYED RELEASE ORAL DAILY
Status: DISCONTINUED | OUTPATIENT
Start: 2019-08-27 | End: 2019-09-05 | Stop reason: HOSPADM

## 2019-08-27 RX ORDER — AMLODIPINE BESYLATE 5 MG/1
5 TABLET ORAL DAILY
Status: DISCONTINUED | OUTPATIENT
Start: 2019-08-27 | End: 2019-09-01

## 2019-08-27 RX ORDER — ACETAMINOPHEN 325 MG/1
650 TABLET ORAL EVERY 6 HOURS PRN
Status: DISCONTINUED | OUTPATIENT
Start: 2019-08-27 | End: 2019-09-05 | Stop reason: HOSPADM

## 2019-08-27 RX ORDER — CLOPIDOGREL BISULFATE 75 MG/1
75 TABLET ORAL DAILY
Status: DISCONTINUED | OUTPATIENT
Start: 2019-08-27 | End: 2019-09-05 | Stop reason: HOSPADM

## 2019-08-27 RX ORDER — HYDROXYCHLOROQUINE SULFATE 200 MG/1
200 TABLET, FILM COATED ORAL 2 TIMES DAILY
Status: DISCONTINUED | OUTPATIENT
Start: 2019-08-27 | End: 2019-09-05 | Stop reason: HOSPADM

## 2019-08-27 RX ORDER — MONTELUKAST SODIUM 10 MG/1
10 TABLET ORAL NIGHTLY
Status: DISCONTINUED | OUTPATIENT
Start: 2019-08-27 | End: 2019-09-05 | Stop reason: HOSPADM

## 2019-08-27 RX ORDER — CARVEDILOL 12.5 MG/1
12.5 TABLET ORAL 2 TIMES DAILY WITH MEALS
Status: DISCONTINUED | OUTPATIENT
Start: 2019-08-27 | End: 2019-09-05 | Stop reason: HOSPADM

## 2019-08-27 RX ORDER — ASPIRIN 81 MG/1
81 TABLET ORAL DAILY
Status: DISCONTINUED | OUTPATIENT
Start: 2019-08-27 | End: 2019-09-05 | Stop reason: HOSPADM

## 2019-08-27 RX ORDER — PREDNISONE 1 MG/1
1 TABLET ORAL DAILY
Status: DISCONTINUED | OUTPATIENT
Start: 2019-08-27 | End: 2019-08-29

## 2019-08-27 RX ORDER — ALBUTEROL SULFATE 90 UG/1
2 AEROSOL, METERED RESPIRATORY (INHALATION) EVERY 6 HOURS PRN
Status: DISCONTINUED | OUTPATIENT
Start: 2019-08-27 | End: 2019-09-05 | Stop reason: HOSPADM

## 2019-08-27 RX ORDER — GABAPENTIN 300 MG/1
300 CAPSULE ORAL NIGHTLY
Status: DISCONTINUED | OUTPATIENT
Start: 2019-08-27 | End: 2019-08-28

## 2019-08-27 RX ORDER — LABETALOL HYDROCHLORIDE 5 MG/ML
10 INJECTION, SOLUTION INTRAVENOUS EVERY 6 HOURS PRN
Status: DISCONTINUED | OUTPATIENT
Start: 2019-08-27 | End: 2019-09-05 | Stop reason: HOSPADM

## 2019-08-27 RX ORDER — MYCOPHENOLATE MOFETIL 250 MG/1
500 CAPSULE ORAL 2 TIMES DAILY
Status: DISCONTINUED | OUTPATIENT
Start: 2019-08-27 | End: 2019-09-05 | Stop reason: HOSPADM

## 2019-08-27 RX ORDER — POLYETHYLENE GLYCOL 3350 17 G/17G
17 POWDER, FOR SOLUTION ORAL DAILY PRN
Status: DISCONTINUED | OUTPATIENT
Start: 2019-08-27 | End: 2019-09-05 | Stop reason: HOSPADM

## 2019-08-27 RX ORDER — BACLOFEN 10 MG/1
10 TABLET ORAL 3 TIMES DAILY
Status: DISCONTINUED | OUTPATIENT
Start: 2019-08-27 | End: 2019-08-28

## 2019-08-27 RX ORDER — QUETIAPINE FUMARATE 25 MG/1
25 TABLET, FILM COATED ORAL ONCE
Status: COMPLETED | OUTPATIENT
Start: 2019-08-27 | End: 2019-08-27

## 2019-08-27 RX ORDER — SODIUM CHLORIDE 0.9 % (FLUSH) 0.9 %
10 SYRINGE (ML) INJECTION
Status: DISCONTINUED | OUTPATIENT
Start: 2019-08-27 | End: 2019-09-05 | Stop reason: HOSPADM

## 2019-08-27 RX ORDER — ONDANSETRON 8 MG/1
8 TABLET, ORALLY DISINTEGRATING ORAL EVERY 8 HOURS PRN
Status: DISCONTINUED | OUTPATIENT
Start: 2019-08-27 | End: 2019-09-05 | Stop reason: HOSPADM

## 2019-08-27 RX ADMIN — PREDNISONE 1 MG: 1 TABLET ORAL at 09:08

## 2019-08-27 RX ADMIN — MYCOPHENOLATE MOFETIL 500 MG: 250 CAPSULE ORAL at 09:08

## 2019-08-27 RX ADMIN — GABAPENTIN 300 MG: 300 CAPSULE ORAL at 09:08

## 2019-08-27 RX ADMIN — BACLOFEN 10 MG: 10 TABLET ORAL at 09:08

## 2019-08-27 RX ADMIN — HEPARIN SODIUM 5000 UNITS: 5000 INJECTION INTRAVENOUS; SUBCUTANEOUS at 06:08

## 2019-08-27 RX ADMIN — QUETIAPINE FUMARATE 25 MG: 25 TABLET ORAL at 09:08

## 2019-08-27 RX ADMIN — MONTELUKAST 10 MG: 10 TABLET, FILM COATED ORAL at 09:08

## 2019-08-27 RX ADMIN — AMLODIPINE BESYLATE 5 MG: 5 TABLET ORAL at 09:08

## 2019-08-27 RX ADMIN — ATORVASTATIN CALCIUM 40 MG: 10 TABLET, FILM COATED ORAL at 09:08

## 2019-08-27 RX ADMIN — HYDROXYCHLOROQUINE SULFATE 200 MG: 200 TABLET, FILM COATED ORAL at 09:08

## 2019-08-27 RX ADMIN — CARVEDILOL 12.5 MG: 12.5 TABLET, FILM COATED ORAL at 04:08

## 2019-08-27 RX ADMIN — PANTOPRAZOLE SODIUM 40 MG: 40 TABLET, DELAYED RELEASE ORAL at 09:08

## 2019-08-27 RX ADMIN — HEPARIN SODIUM 5000 UNITS: 5000 INJECTION INTRAVENOUS; SUBCUTANEOUS at 09:08

## 2019-08-27 RX ADMIN — BACLOFEN 10 MG: 10 TABLET ORAL at 03:08

## 2019-08-27 RX ADMIN — CARVEDILOL 12.5 MG: 12.5 TABLET, FILM COATED ORAL at 09:08

## 2019-08-27 RX ADMIN — CLOPIDOGREL BISULFATE 75 MG: 75 TABLET, FILM COATED ORAL at 09:08

## 2019-08-27 RX ADMIN — ASPIRIN 81 MG: 81 TABLET, COATED ORAL at 09:08

## 2019-08-27 RX ADMIN — HEPARIN SODIUM 5000 UNITS: 5000 INJECTION INTRAVENOUS; SUBCUTANEOUS at 03:08

## 2019-08-27 NOTE — CONSULTS
Food & Nutrition  Education    Diet Education: Cardiac   Learners: Pt's sister       Nutrition Education provided with handouts: Cardiac-[facility] Nutrition Therapy      Comments: Unable to see pt 2/2 ANA for MRI. Spoke with pt's sister who reports she will share information with pt and her daughter. Dicussed foods recommended and not recommended on a low fat diet. Encourage consumption of fresh, unprocessed foods. Family member verbalized understanding.       All questions and concerns answered.      Follow-Up: Yes     Please re-consult as needed.

## 2019-08-27 NOTE — ED PROVIDER NOTES
"Source of History:  Patient, daughter    Chief complaint:  Altered Mental Status (Pt states "I feel funny" pt will not describe further Pt's daughter states "she was walking to the bathroom and it looked like she was struggling to walk, her speech was slurred and she wasn't making since, she couldn't finish a sentence". Pt AAX4, no unilateral drift. Symptoms resolved currently but still exhibits some confusion. LSN 2100. 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  She noted that she had a doctor's appointment earlier today and appeared normal, went home, ate a normal dinner, but then approximately 2 hr later at about 9:00 p.m. the patient shouted for help and her daughter found her in the hallway hunched over her walker.  She states that she appeared very weak, and when she sat her down on the toilet to rest the patient was unable to finish a sentence because of confusion, and then began to have slurred speech.  This whole episode lasted approximately 30 min, and while debating coming to the ED a resolved.  The daughter notes a previous episode similar to this or in December 2018, she was admitted with encephalopathy, there is concern about hypertensive emergency versus viral meningitis as a cause.  Currently, the patient has no complaints, denies any pain or discomfort, notes mild shortness of breath which is chronic.    ROS: As per HPI and below:    General: No fever.  No chills.  Eyes: No visual changes.  Head: No headache.    Integument: No rashes or lesions.  Chest: No shortness of breath.  Cardiovascular: No chest pain.  Abdomen: No abdominal pain.  No nausea or vomiting.  Urinary: No abnormal urination.  Neurologic: No focal weakness.  No numbness.  Hematologic: No easy bruising.  Endocrine: No excessive thirst or urination.      Review of patient's allergies indicates:  No Known Allergies    Current Facility-Administered Medications on " File Prior to Encounter   Medication Dose Route Frequency Provider Last Rate Last Dose    onabotulinumtoxina injection 200 Units  200 Units Intramuscular Q90 Days Baldomero Giang MD   200 Units at 07/29/19 9343     Current Outpatient Medications on File Prior to Encounter   Medication Sig Dispense Refill    acetaminophen (TYLENOL) 500 MG tablet Take 1,000 mg by mouth daily as needed for Pain.      albuterol (PROVENTIL/VENTOLIN HFA) 90 mcg/actuation inhaler Inhale 2 puffs into the lungs every 6 (six) hours as needed for Wheezing. Rescue 1 Inhaler 0    amLODIPine (NORVASC) 5 MG tablet Take 1 tablet (5 mg total) by mouth once daily. 14 tablet 1    aspirin (ECOTRIN) 81 MG EC tablet Take 81 mg by mouth once daily.      baclofen (LIORESAL) 10 MG tablet Take 1 tablet (10 mg total) by mouth 3 (three) times daily. 90 tablet 2    carvedilol (COREG) 12.5 MG tablet Take 1 tablet (12.5 mg total) by mouth 2 (two) times daily with meals. 180 tablet 3    clopidogrel (PLAVIX) 75 mg tablet Take 1 tablet (75 mg total) by mouth once daily. 90 tablet 1    diazePAM (VALIUM) 2 MG tablet Take 1 tablet (2 mg total) by mouth nightly as needed for Anxiety. 90 tablet 1    furosemide (LASIX) 20 MG tablet TAKE 1 TABLET BY MOUTH EVERY DAY AT 6 AM 30 tablet 0    furosemide (LASIX) 40 MG tablet       gabapentin (NEURONTIN) 300 MG capsule Take 1 capsule (300 mg total) by mouth every evening. 90 capsule 3    hydroxychloroquine (PLAQUENIL) 200 mg tablet Take 1 tablet (200 mg total) by mouth 2 (two) times daily. 60 tablet 2    linaclotide (LINZESS) 145 mcg Cap capsule Take 1 capsule (145 mcg total) by mouth once daily. 30 capsule 0    magnesium oxide (MAG-OX) 400 mg (241.3 mg magnesium) tablet Take 1 tablet (400 mg total) by mouth once daily. 90 tablet 0    montelukast (SINGULAIR) 10 mg tablet Take 1 tablet (10 mg total) by mouth every evening. 90 tablet 0    mycophenolate (CELLCEPT) 500 mg Tab Take 1 tablet (500 mg total) by mouth  2 (two) times daily. 180 tablet 0    omeprazole (PRILOSEC) 20 MG capsule Take 1 capsule (20 mg total) by mouth once daily. 90 capsule 0    potassium chloride SA (K-DUR,KLOR-CON) 10 MEQ tablet Take 2 tablets (20 mEq total) by mouth once daily. 180 tablet 0    predniSONE (DELTASONE) 1 MG tablet Take 1 tablet (1 mg total) by mouth once daily. 120 tablet 0    predniSONE (DELTASONE) 10 MG tablet Take 20 mg by mouth once daily.      sulfaSALAzine (AZULFIDINE) 500 MG TbEC Take 2 tablets (1,000 mg total) by mouth 2 (two) times daily. 120 tablet 2    traMADol (ULTRAM) 50 mg tablet Take 1-2 tablets ( mg total) by mouth every 24 hours as needed for Pain. 40 tablet 0       PMH:  As per HPI and below:  Past Medical History:   Diagnosis Date    Acute hypoxemic respiratory failure 4/11/2018    Altered mental status     Anemia     Arthritis     Bilateral hand pain 3/14/2018    Branch retinal vein occlusion, left eye 2/20/2015    Chronic bilateral low back pain without sciatica 3/23/2017    Cognitive communication deficit 12/19/2017    Cystoid macular edema, left eye 2/20/2015    Cystoid macular edema, left eye 2/20/2015    Delirium superimposed on dementia     DJD (degenerative joint disease) of cervical spine 5/15/2013    Fatty liver 8/26/2014    Goiter 4/9/2018    Hashimoto's disease     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    Hypertension     Hypertensive encephalopathy     IBS (irritable bowel syndrome) 6/21/2017    IGT (impaired glucose tolerance) 1/12/2016    Iron deficiency anemia secondary to inadequate dietary iron intake 6/24/2013    Joint pain     Keratoconjunctivitis sicca of both eyes not specified as Sjogren's 7/29/2016    Leiomyoma of uterus, unspecified 9/16/2014    Long QT interval 6/29/2016    Due to medication (plaquenil)     Macular edema 1/12/2015    Multinodular goiter 1/12/2016    Neuropathy 8/23/2017    Plaquenil causing adverse effect in therapeutic use 10/7/2016    Pneumococcal  vaccine refused 8/17/2016    Pneumonia due to Streptococcus pneumoniae 4/5/2018    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/2015    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    s/p Right Total knee replacement 5/15/2013    Scoliosis of thoracic spine 5/15/2013    Small vessel disease, cerebrovascular 12/28/2017    Stroke     UTI (urinary tract infection) 12/29/2018    Vascular dementia 12/6/2017     Past Surgical History:   Procedure Laterality Date    BREAST CYST EXCISION      CATARACT EXTRACTION      COLONOSCOPY N/A 9/29/2015    Performed by [facility]ELINA Sanchez [facility] at [facility] (4TH [facility])    EGD (ESOPHAGOGASTRODUODENOSCOPY) N/A 3/11/2014    Performed by Federico Escobar [facility] at [facility] END[facility] (4TH FLR)    EGD (ESOPHAGOGASTRODUODENOSCOPY) N/A 11/5/2013    Performed by [facility]obar [facility] at [facility] END[facility] (4TH FLR)    ESOPHAGOGASTRODUODENOSCOPY (EGD) N/A 10/4/2017    [facility]    EYE SURGERY      INJECTION-STEROID-EPIDURAL-TRANSFORAMINAL Right 9/20/2017    [facility] Valdez [facility] at [facility]    JOINT REPLACEMENT      right knee    KNEE SURGERY Left 12/31/2013    TKR    left parotidectomy      mixed tumor    SALIVARY GLAND SURGERY      TONSILLECTOMY         Social History     Socioeconomic History    Marital status:      Spouse name: Not on file    Number of children: Not on file    Years of education: Not on file    Highest education level: Not on file   Occupational History    Not on file   Social Needs    Financial resource strain: Not hard at all    Food insecurity:     Worry: Never true     Inability: Never true    Transportation needs:     Medical: No     Non-medical: No   Tobacco Use    Smoking status: Never Smoker    Smokeless tobacco: Never Used   Substance and Sexual Activity    Alcohol use: No     Alcohol/week: 0.0 oz     Frequency: Monthly or less     Drinks per session: 1 or 2     Binge frequency: Never     Comment: very  seldom     Drug use: No    Sexual activity: Never     Comment: ,  age 52,    Lifestyle    Physical activity:     Days per week: 1 day     Minutes per session: 40 min    Stress: To some extent   Relationships    Social connections:     Talks on phone: More than three times a week     Gets together: More than three times a week     Attends [employer] service: Not on file     Active member of club or organization: Yes     Attends meetings of clubs or organizations: 1 to 4 times per year     Relationship status:    Other Topics Concern    Are you pregnant or think you may be? No    Breast-feeding No   Social History Narrative    Not on file       Family History   Problem Relation Age of Onset    Hypertension Mother     Heart disease Mother     Prostate cancer Father         prostate cancer    Cancer Father     Breast cancer Maternal Grandmother     Lupus Neg Hx     Psoriasis Neg Hx     Melanoma Neg Hx     Colon cancer Neg Hx        Physical Exam:    Vitals:    08/26/19 2250   BP:    Pulse:    Resp:    Temp: 98.3 °F (36.8 °C)     Appearance: No acute distress.  Skin: No rashes seen.  Good turgor.  No abrasions.  No ecchymoses.  Eyes: No conjunctival injection.  ENT: Oropharynx clear.    Chest:  Diminished breath sounds bilaterally.  No wheezes.  No rhonchi.  Cardiovascular: Regular rate and rhythm.  No murmurs. No gallops. No rubs.  Abdomen: Soft.  Not distended.  Nontender.  No guarding.  No rebound. No Masses  Musculoskeletal: Good range of motion all joints.  No deformities.  Neck supple.  No meningismus.  Neurologic: Moves all extremities.  Normal sensation.  No facial droop.  Normal speech.    Mental Status:  Alert and oriented x 3.  Appropriate, conversant.          Laboratory Studies:  Labs Reviewed   CBC W/ AUTO DIFFERENTIAL   COMPREHENSIVE METABOLIC PANEL   B-TYPE NATRIURETIC PEPTIDE   TROPONIN I   URINALYSIS, REFLEX TO URINE CULTURE       EKG:  NSR, no ST changes, normal  intervals, no TWI. Compared to 4/2019 no significant change in wave form, fewer PVCs.  X-rays:  X-ray chest with no obvious infiltrate  Chart reviewed: DC summaries from 3/19 and 12/18 for hypoxia/AMS, respectively.    Imaging Results          X-Ray Chest 1 View (In process)                 Medications Given:  Medications - No data to display    Discussed with:  Vascular neurology    MDM:    83 y.o. female with short slurred speech episode with confusion, consider TIA versus hypertensive emergency.  Patient was hypertensive on arrival but improved without treatment.  She is back at any improved mental status with no slurred speech during my examination and re-evaluation.  Labs unremarkable, no sign infection, head CT shows no acute bleeding.  Discussed with vascular neurology, will place in observation for further evaluation of possible TIA.    Diagnostic Impression:    1. Altered mental status    2. Dyspnea               Osvaldo Lagos MD  08/27/19 0413

## 2019-08-27 NOTE — CARE UPDATE
Attempted to transport patient to MRI. Nurse states,  patient is on old monitor that does not come off wall and can not be transported at this time. Nurse states she is waiting on tele to bring a monitor to bedside and patient can be transported then. Nurse states she will call MRI when monitor delivered.

## 2019-08-27 NOTE — ED NOTES
Assumed care of patient.     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    LOC: The patient is awake, alert and aware of environment with an appropriate affect, the patient is oriented x 3 and speaking appropriately.  APPEARANCE: Patient resting comfortably and in no acute distress, patient is clean and well groomed, patient's clothing is properly fastened.  SKIN: The skin is warm and dry, color consistent with ethnicity, patient has normal skin turgor and moist mucus membranes, skin intact, no breakdown or bruising noted.  MUSCULOSKELETAL: Patient moving all extremities spontaneously. Pt is able to ambulate with walker and standby assistance.   RESPIRATORY: Airway is open and patent, respirations are spontaneous.  CARDIAC: Patient has a normal rate, no periphreal edema noted, capillary refill < 3 seconds.  ABDOMEN: Soft and non tender to palpation.  GI/: Pt is continent to bowel and bladder.    The patient is resting quietly, eyes closed, arouses easily to stimuli. Airway is open and patent, respirations are spontaneous, appearance: in no acute distress and resting comfortably.

## 2019-08-27 NOTE — HPI
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  She noted that she had a doctor's appointment earlier today and appeared normal, went home, ate a normal dinner, but then approximately 2 hr later at about 9:00 p.m. the patient shouted for help and her daughter found her in the hallway hunched over her walker.  She states that she appeared very weak, and when she sat her down on the toilet to rest the patient was unable to finish a sentence because of confusion, and then began to have slurred speech.  This whole episode lasted approximately 30 min, and while debating coming to the ED a resolved.  The daughter notes a previous episode similar to this or in December 2018, she was admitted with encephalopathy, there is concern about hypertensive emergency versus viral meningitis as a cause.  Currently, the patient has no complaints, denies any pain or discomfort, notes mild shortness of breath which is chronic  Patient does not think that she had a problems but daughter states that there was definatley a problem and it scared her as she could not  remember how to walk, she just stood there.   Patient has a long complicated medical history. She sees Dr Giang for cervical dystonia and Botox injections and Dr Tobias for memory loss.   Upon examination but still slightly confused but moving all extremities. Will admit for TIA w/u.

## 2019-08-27 NOTE — H&P
[facility]  Vascular Neurology  Comprehensive Stroke Center  History & Physical    Inpatient consult to Vascular Neurology  Consult performed by: Aliyah Tabares NP  Consult ordered by: Osvaldo Lagos MD        Assessment/Plan:     Patient is a 83 y.o. year old female with:    * TIA (transient ischemic attack)  84 y/o female with aphasia and dysarthria that has resolved, TIA W/U in process    Antithrombotics: DAPT    Statins: Lipitor 40 mg daily    Aggressive risk factor modification: HTN, HLD     Rehab efforts: The patient has been evaluated by a stroke team provider and the therapy needs have been fully considered based off the presenting complaints and exam findings. The following therapy evaluations are needed: PT evaluate and treat, OT evaluate and treat, SLP evaluate and treat    Diagnostics ordered/pending: MRA head to assess vasculature, MRA neck/arch to assess vasculature, MRI head without contrast to assess brain parenchyma, TTE to assess cardiac function/status     VTE prophylaxis: Heparin 5000 units SQ every 8 hours, SCD's    BP parameters: TIA: SBP <220 until imaging confirmation of no infarct         Cryptogenic organizing pneumonia  Continue home meds    Chronic renal failure in pediatric patient, stage 3 (moderate)  stroke risk factor  Avoid nephrotoxins    Essential hypertension  stroke risk factor  SBP <220        STROKE DOCUMENTATION          NIH Scale:  1a. Level of Consciousness: 0-->Alert, keenly responsive  1b. LOC Questions: 0-->Answers both questions correctly  1c. LOC Commands: 0-->Performs both tasks correctly  2. Best Gaze: 0-->Normal  3. Visual: 0-->No visual loss  4. Facial Palsy: 0-->Normal symmetrical movements  5a. Motor Arm, Left: 0-->No drift, limb holds 90 (or 45) degrees for full 10 secs  5b. Motor Arm, Right: 0-->No drift, limb holds 90 (or 45) degrees for full 10 secs  6a. Motor Leg, Left: 0-->No drift, leg holds 30 degree position for full 5 secs  6b.  Motor Leg, Right: 0-->No drift, leg holds 30 degree position for full 5 secs  7. Limb Ataxia: 0-->Absent  8. Sensory: 0-->Normal, no sensory loss  9. Best Language: 0-->No aphasia, normal  10. Dysarthria: 0-->Normal  11. Extinction and Inattention (formerly Neglect): 0-->No abnormality  Total (NIH Stroke Scale): 0     Modified De Berry Score: 1  Marion Coma Scale:15   ABCD2 Score:    [id number] Score:   HAS -BLED Score:   ICH Score:   Hunt & Jain Classification:      Thrombolysis Candidate? No, Out of window     Delays to Thrombolysis?  No    Interventional Revascularization Candidate?   Is the patient eligible for mechanical endovascular reperfusion (ANALI)?  No; No significant neurological deficit    Hemorrhagic change of an Ischemic Stroke: Does this patient have an ischemic stroke with hemorrhagic changes? 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  She noted that she had a doctor's appointment earlier today and appeared normal, went home, ate a normal dinner, but then approximately 2 hr later at about 9:00 p.m. the patient shouted for help and her daughter found her in the hallway hunched over her walker.  She states that she appeared very weak, and when she sat her down on the toilet to rest the patient was unable to finish a sentence because of confusion, and then began to have slurred speech.  This whole episode lasted approximately 30 min, and while debating coming to the ED a resolved.  The daughter notes a previous episode similar to this or in December 2018, she was admitted with encephalopathy, there is concern about hypertensive emergency versus viral meningitis as a cause.  Currently, the patient has no complaints, denies any pain or discomfort, notes mild shortness of breath which is chronic  Patient does not think that she had a problems but daughter states that there was definatley a  problem and it scared her as she could not  remember how to walk, she just stood there.   Patient has a long complicated medical history. She sees Dr Giang for cervical dystonia and Botox injections and Dr Tobias for memory loss.   Upon examination but still slightly confused but moving all extremities. Will admit for TIA w/u.          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    Hypertension     Hypertensive encephalopathy     IBS (irritable bowel syndrome) 6/21/2017    IGT (impaired glucose tolerance) 1/12/2016    Iron deficiency anemia secondary to inadequate dietary iron intake 6/24/2013    Joint pain     Keratoconjunctivitis sicca of both eyes not specified as Sjogren's 7/29/2016    Leiomyoma of uterus, unspecified 9/16/2014    Long QT interval 6/29/2016    Due to medication (plaquenil)     Macular edema 1/12/2015    Multinodular goiter 1/12/2016    Neuropathy 8/23/2017    Plaquenil causing adverse effect in therapeutic use 10/7/2016    Pneumococcal vaccine refused 8/17/2016    Pneumonia due to Streptococcus pneumoniae 4/5/2018    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/2015    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    s/p Right Total knee replacement 5/15/2013    Scoliosis of thoracic spine 5/15/2013    Small vessel disease, cerebrovascular 12/28/2017    Stroke     UTI (urinary tract infection) 12/29/2018    Vascular  dementia 12/6/2017     Past Surgical History:   Procedure Laterality Date    BREAST CYST EXCISION      CATARACT EXTRACTION      COLONOSCOPY N/A 9/29/2015    Performed by [facility]EL[facility] ([facility])    EGD (ESOPHAGOGASTRODUODENOSCOPY) N/A 3/11/2014    Performed by Federico Escobar [facility] at [facility] ENDO (4TH FLR)    EGD (ESOPHAGOGASTRODUODENOSCOPY) N/A 11/5/2013    Performed by [facility] at [facility] ENDO (4TH FLR)    ESOPHAGOGASTRODUODENOSCOPY (EGD) N/A 10/4/2017    [facility]    EYE SURGERY      INJECTION-STEROID-EPIDURAL-TRANSFORAMINAL Right 9/20/2017    [facility] PAIN [facility]GT    JOINT REPLACEMENT      right knee    KNEE SURGERY Left 12/31/2013    TKR    left parotidectomy      mixed tumor    SALIVARY GLAND SURGERY      TONSILLECTOMY       Family History   Problem Relation Age of Onset    Hypertension Mother     Heart disease Mother     Prostate cancer Father         prostate cancer    Cancer Father     Breast cancer Maternal Grandmother      Social History     Tobacco Use    Smoking status: Never Smoker    Smokeless tobacco: Never Used   Substance Use Topics    Alcohol use: No     Alcohol/week: 0.0 oz     Frequency: Monthly or less     Drinks per session: 1 or 2     Binge frequency: Never     Comment: very seldom     Drug use: No     Review of patient's allergies indicates:  No Known Allergies    Medications: I have reviewed the current medication administration record.      (Not in a hospital admission)    Review of Systems   Constitutional: Negative for chills and fever.   HENT: Negative for ear discharge and ear pain.    Eyes: Negative for pain and itching.   Respiratory: Positive for shortness of breath.    Cardiovascular: Negative for chest pain and leg swelling.   Gastrointestinal: Negative for abdominal distention and abdominal pain.   Genitourinary: Negative for difficulty urinating and dysuria.   Musculoskeletal:  Positive for arthralgias and neck pain.   Skin: Negative for rash and wound.   Neurological: Positive for speech difficulty and weakness.   Psychiatric/Behavioral: Positive for confusion.     Objective:     Vital Signs (Most Recent):  Temp: 98.3 °F (36.8 °C) (08/26/19 2250)  Pulse: 92 (08/27/19 0349)  Resp: 20 (08/27/19 0349)  BP: (!) 175/72 (08/27/19 0218)  SpO2: 98 % (08/27/19 0349)    Vital Signs Range (Last 24H):  Temp:  [98.3 °F (36.8 °C)]   Pulse:  [87-96]   Resp:  [16-21]   BP: (148-205)/(72-88)   SpO2:  [94 %-99 %]     Physical Exam   Constitutional: She is oriented to person, place, and time. She appears well-developed and well-nourished.   HENT:   Head: Normocephalic.   Eyes: Pupils are equal, round, and reactive to light.   Neck: Normal range of motion.   Cardiovascular: Normal rate.   Pulmonary/Chest: Effort normal.   Abdominal: Soft.   Neurological: She is alert and oriented to person, place, and time.   Skin: Skin is warm and dry.   Nursing note and vitals reviewed.      Neurological Exam:   LOC: alert  Attention Span: Good   Language: No aphasia  Articulation: No dysarthria  Orientation: Person, Place, Time   Visual Fields: Full  EOM (CN III, IV, VI): Full/intact  Pupils (CN II, III): PERRL  Facial Sensation (CN V): Normal  Facial Movement (CN VII): Symmetric facial expression    Gag Reflex: present  Reflexes: 2+ throughout  Motor: Arm left  Normal 5/5  Leg left  Normal 5/5  Arm right  Normal 5/5  Leg right Normal 5/5  Cebellar: No evidence of appendicular or axial ataxia  Sensation: Intact to light touch, temperature and vibration  Tone: Normal tone throughout      Laboratory:  CMP:   Recent Labs   Lab 08/26/19  2357   CALCIUM 9.4   ALBUMIN 3.9   PROT 6.9   *   K 5.5*   CO2 25   CL 96   BUN 18   CREATININE 1.1   ALKPHOS 49*   ALT 11   AST 20   BILITOT 0.4     CBC:   Recent Labs   Lab 08/27/19  0057 08/27/19  0106   WBC 8.41  --    RBC 4.41  --    HGB 12.0  --    HCT 39.4 36     --    MCV  89  --    MCH 27.2  --    MCHC 30.5*  --      Lipid Panel: No results for input(s): CHOL, LDLCALC, HDL, TRIG in the last 168 hours.  Coagulation: No results for input(s): PT, INR, APTT in the last 168 hours.  Hgb A1C: No results for input(s): HGBA1C in the last 168 hours.  TSH: No results for input(s): TSH in the last 168 hours.    Diagnostic Results:      Brain imaging:  CT head w/o contrast 8-27-19 results:  No CT evidence of acute intracranial abnormality. Clinical correlation and further evaluation as warranted.    Chronic senescent and microvascular ischemic changes.  Stable small punctate remote lacunar-type infarcts of the bilateral basal ganglia.    Vessel Imaging:        Cardiac Evaluation:   EKG 8-26-19 results:  Normal sinus rhythm  Voltage criteria for left ventricular hypertrophy  Abnormal ECG  When compared with ECG of 14-APR-2019 11:31,  Premature ventricular complexes are no longer Present          Aliyah Tabares NP  [facility] Stroke [facility]  Department of Vascular Neurology   [facility]

## 2019-08-27 NOTE — PT/OT/SLP EVAL
Speech Language Pathology Evaluation  Cognitive/Bedside Swallow    Patient Name:  Selma Lux MD   MRN:  [id number]  Admitting Diagnosis: TIA (transient ischemic attack)    Recommendations:                  General Recommendations:  Speech/language therapy  Diet recommendations:  Regular, Thin   Aspiration Precautions: Strict aspiration precautions   General Precautions: Standard, aspiration, fall  Communication strategies:  provide increased time to answer and go to room if call light pushed    History:     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    Hypertension     Hypertensive encephalopathy     IBS (irritable bowel syndrome) 6/21/2017    IGT (impaired glucose tolerance) 1/12/2016    Iron deficiency anemia secondary to inadequate dietary iron intake 6/24/2013    Joint pain     Keratoconjunctivitis sicca of both eyes not specified as Sjogren's 7/29/2016    Leiomyoma of uterus, unspecified 9/16/2014    Long QT interval 6/29/2016    Due to medication (plaquenil)     Macular edema 1/12/2015    Multinodular goiter 1/12/2016    Neuropathy 8/23/2017    Plaquenil causing adverse effect in therapeutic use 10/7/2016    Pneumococcal vaccine refused 8/17/2016    Pneumonia due to Streptococcus pneumoniae 4/5/2018    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/2015    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    s/p Right Total knee replacement  "5/15/2013    Scoliosis of thoracic spine 5/15/2013    Small vessel disease, cerebrovascular 12/28/2017    Stroke     UTI (urinary tract infection) 12/29/2018    Vascular dementia 12/6/2017       Past Surgical History:   Procedure Laterality Date    BREAST CYST EXCISION      CATARACT EXTRACTION      COLONOSCOPY N/A 9/29/2015    Performed by [facility]EL[facility] at [facility] ENDO (4TH FLR)    EGD (ESOPHAGOGASTRODUODENOSCOPY) N/A 3/11/2014    Performed by Fed[facility]ico Escobar MD at [facility] ENDO (4TH FLR)    EGD (ESOPHAGOGASTRODUODENOSCOPY) N/A 11/5/2013    Performed by [facility] at [facility] ENDO (4TH FLR)    ESOPHAGOGASTRODUODENOSCOPY (EGD) N/A 10/4/2017    [facility] by [facility]    EYE SURGERY      INJECTION-STEROID-EPIDURAL-TRANSFORAMINAL Right 9/20/2017    [facility]    JOINT REPLACEMENT      right knee    KNEE SURGERY Left 12/31/2013    TKR    left parotidectomy      mixed tumor    SALIVARY GLAND SURGERY      TONSILLECTOMY         Social History: Patient lives with daughter, documented vascular dementia.  Daughter reports pt is not back to language baseline.      Prior Intubation HX:  none    Prior diet: reg/thin    Subjective     "She is still not able to complete her thoughts."     Pain/Comfort:  · Pain Rating 1: 0/10  · Pain Rating Post-Intervention 1: 0/10    Objective:     Cognitive Status:    Orientation Person and Time  Problem Solving Solutions 100% for basic ADL situations      Receptive Language:   Comprehension:   impaired  Questions Complex yes/no 30%  Commands  multistep basic commands x1/1     Pragmatics:    decreased eye contact and interaction in conversation    Expressive Language:  Verbal:    WFL for basic ideas  Naming Confrontation 100% and Single word responsive naming 100%      Motor Speech:  WFL    Voice:   WFL    Visual-Spatial:  tba      Oral Musculature Evaluation  · Oral Musculature: WFL  · Dentition: present and " adequate  · Secretion Management: adequate  · Mucosal Quality: good  · Mandibular Strength and Mobility: WFL  · Oral Labial Strength and Mobility: WFL  · Lingual Strength and Mobility: WFL  · Volitional Cough: WFL  · Volitional Swallow: WFL  · Voice Prior to PO Intake: clear    Bedside Swallow Eval:   Consistencies Assessed:  · Thin liquids 6 oz via straw  · Solids 1/2 eryn cracker     Oral Phase:   · WFL    Pharyngeal Phase:   · no overt clinical signs/symptoms of aspiration    Compensatory Strategies  · None    Treatment: Education provided re: role of SLP, diet recs, and POC.  Daughter in agreement.      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 .  She presents with functional language for basic thoughts/ideas though daughter reports decreased fluency with of complex thought.  SLP to continue to assess.    Goals:   Multidisciplinary Problems     SLP Goals        Problem: SLP Goal    Goal Priority Disciplines Outcome   SLP Goal     SLP Ongoing (interventions implemented as appropriate)   Description:  Speech Language Pathology Goals  Goals expected to be met by 9/3  1. Pt will Ox3 given min A/external aids.   2. Pt will complete mod complex comprehension tasks with 80% accy.  3. Pt will complete further assessment of mod complex word finding to determine need for tx.                          Plan:     · Patient to be seen:  4 x/week   · Plan of Care expires:  09/26/19  · Plan of Care reviewed with:  patient, daughter   · SLP Follow-Up:  Yes       Discharge recommendations:  Discharge Facility/Level of Care Needs: home with home health   Barriers to Discharge:  Level of Skilled Assistance Needed      Time Tracking:     SLP Treatment Date:   08/27/19  Speech Start Time:  0707  Speech Stop Time:  0723     Speech Total Time (min):  16 min    Billable Minutes: Eval 8  and Eval Swallow and Oral Function 8    XUAN Campbell, CCC-SLP  08/27/2019

## 2019-08-27 NOTE — CARE UPDATE
Patient arrived by stretcher in [facility]. Patient placed on MRI safe monitor and war room notified.

## 2019-08-27 NOTE — ASSESSMENT & PLAN NOTE
84 y/o female with aphasia and dysarthria that has resolved, TIA W/U in process    Antithrombotics: DAPT    Statins: Lipitor 40 mg daily    Aggressive risk factor modification: HTN, HLD     Rehab efforts: The patient has been evaluated by a stroke team provider and the therapy needs have been fully considered based off the presenting complaints and exam findings. The following therapy evaluations are needed: PT evaluate and treat, OT evaluate and treat, SLP evaluate and treat    Diagnostics ordered/pending: MRA head to assess vasculature, MRA neck/arch to assess vasculature, MRI head without contrast to assess brain parenchyma, TTE to assess cardiac function/status     VTE prophylaxis: Heparin 5000 units SQ every 8 hours, SCD's    BP parameters: TIA: SBP <220 until imaging confirmation of no infarct

## 2019-08-27 NOTE — PLAN OF CARE
Problem: SLP Goal  Goal: SLP Goal  Speech Language Pathology Goals  Goals expected to be met by 9/3  1. Pt will Ox3 given min A/external aids.   2. Pt will complete mod complex comprehension tasks with 80% accy.  3. Pt will complete further assessment of mod complex word finding to determine need for tx.        Outcome: Ongoing (interventions implemented as appropriate)  Evaluation completed.  Rec regular diet with thin liquids with strict aspiration precautions.  XUAN Campbell, AYNE/SLP  8/27/2019

## 2019-08-27 NOTE — ED NOTES
Pt resting in bed. VSS, RR equal and unlabored. Side rails up x2. Bed wheels locked. Family at bedside. Will continue to monitor.

## 2019-08-27 NOTE — PT/OT/SLP EVAL
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  She presents with the following impairments/functional limitations:  weakness, impaired endurance, impaired self care skills, impaired functional mobilty, gait instability, impaired balance .     Pt was limited t/o session by fatigue but did participate well. PTA she was Juan Miguel for mobility using RW when needed. She is currently limited by the above listed deficits and requires CGA for safety w/ transfers and short distance gait. She is appropriate for acute PT and will begin PT POC.    Rehab Prognosis: Good; patient would benefit from acute skilled PT services to address these deficits and reach maximum level of function.    Recent Surgery: * No surgery found *      Plan:     During this hospitalization, patient to be seen 3 x/week to address the identified rehab impairments via gait training, therapeutic activities, therapeutic exercises and progress toward the following goals:    · Plan of Care Expires:  09/26/19    Subjective     Chief Complaint: weakness  Patient/Family Comments/goals: "I'm not walking good."  Pain/Comfort:  · Pain Rating 1: 0/10    Patients cultural, spiritual, Sabianism conflicts given the current situation: no    Living Environment:  Pt lives w/ her 2 daughters in a 1SH w/ 5 GLORIA and BHR. She has a tub/shower combo.  Prior to admission, patients level of function was Juan Miguel w/ use of RW for mobility. She required assistance from daughters for ADLs.  Equipment used at home: bedside commode, walker, rolling.  DME owned (not currently used): single point cane.  Upon discharge, " patient will have assistance from her 2 daughters and her sister.    Objective:     Communicated with nursing prior to session.  Patient found supine with blood pressure cuff, telemetry, pulse ox (continuous)  upon PT entry to room.    General Precautions: Standard, aspiration, fall   Orthopedic Precautions:N/A   Braces: N/A     Exams:  · Cognitive Exam:  Patient is oriented to Person, Place, Time and Situation  · Gross Motor Coordination:  WFL  · Postural Exam:  Patient presented with the following abnormalities:    · -       Rounded shoulders  · Sensation:    · -       Intact  · RLE ROM: WFL  · RLE Strength: WFL except HF 2+/5 and KE 3-/5  · LLE ROM: WFL  · LLE Strength: WFL except KF 4/5    Functional Mobility:  · Bed Mobility:   · Supine>sit on bed w/ ModA for trunk  · Sit>supine on bed w/ Tamir for BLE  · Transfers:    · Sit<>stand to/from EOB w/o AD w/ CGA for safety  · Gait:   · ~10ft in room w/o AD w/ CGA for safety, instability noted  · Limited in distance by fatigue  · Balance:   · Static stand w/o AD w/ CGA      Therapeutic Activities and Exercises:   Pt was educated on PT role and POC. Pt and family verb understanding.    AM-PAC 6 CLICK MOBILITY  Total Score:15     Patient left supine with all lines intact, call button in reach and daughter present.    GOALS:   Multidisciplinary Problems     Physical Therapy Goals        Problem: Physical Therapy Goal    Goal Priority Disciplines Outcome Goal Variances Interventions   Physical Therapy Goal     PT, PT/OT Ongoing (interventions implemented as appropriate)     Description:  Goals to be met by: 9/10/19     Patient will increase functional independence with mobility by performin. Supine to sit with Stand-by Assistance  2. Sit to supine with Stand-by Assistance  3. Sit to stand transfer with Supervision  4. Bed to chair transfer with Supervision using Rolling Walker  5. Gait  x 100 feet with Stand-by Assistance using Rolling Walker.   6. Ascend/descend 5  stair with bilateral Handrails Contact Guard Assistance                      History:     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    Hypertension     Hypertensive encephalopathy     IBS (irritable bowel syndrome) 6/21/2017    IGT (impaired glucose tolerance) 1/12/2016    Iron deficiency anemia secondary to inadequate dietary iron intake 6/24/2013    Joint pain     Keratoconjunctivitis sicca of both eyes not specified as Sjogren's 7/29/2016    Leiomyoma of uterus, unspecified 9/16/2014    Long QT interval 6/29/2016    Due to medication (plaquenil)     Macular edema 1/12/2015    Multinodular goiter 1/12/2016    Neuropathy 8/23/2017    Plaquenil causing adverse effect in therapeutic use 10/7/2016    Pneumococcal vaccine refused 8/17/2016    Pneumonia due to Streptococcus pneumoniae 4/5/2018    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/2015    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    s/p Right Total knee replacement 5/15/2013    Scoliosis of thoracic spine 5/15/2013    Small vessel disease, cerebrovascular 12/28/2017    Stroke     UTI (urinary tract infection) 12/29/2018    Vascular dementia 12/6/2017       Past Surgical History:   Procedure Laterality Date    BREAST CYST EXCISION      CATARACT EXTRACTION      COLONOSCOPY N/A 9/29/2015    Performed by [facility] at [facility] ([facility] F[facility])    EGD (ESOPHAGOGASTRODUODENOSCOPY)  N/A 3/11/2014    [facility] (4TH FLR)    EGD (ESOPHAGOGASTRODUODENOSCOPY) N/A 11/5/2013    [facility] (4TH FLR)    ESOPHAGOGASTRODUODENOSCOPY (EGD) N/A 10/4/2017    [facility]    EYE SURGERY      INJECTION-STEROID-EPIDURAL-TRANSFORAMINAL Right 9/20/2017    [facility] at [facility]    JOINT REPLACEMENT      right knee    KNEE SURGERY Left 12/31/2013    TKR    left parotidectomy      mixed tumor    SALIVARY GLAND SURGERY      TONSILLECTOMY         Time Tracking:     PT Received On: 08/27/19  PT Start Time: 0855     PT Stop Time: 0915  PT Total Time (min): 20 min     Billable Minutes: Evaluation 20 and Total Time 20      Marilyn Jauregui, PT  08/27/2019

## 2019-08-27 NOTE — CARE UPDATE
Current NIH Stroke Score Values      Most Recent Value   1a. Level of Consciousness  0-->Alert, keenly responsive   1b. LOC Questions  0-->Answers both questions correctly   1c. LOC Commands  0-->Performs both tasks correctly   2. Best Gaze  0-->Normal   3. Visual  0-->No visual loss   4. Facial Palsy  0-->Normal symmetrical movements   5a. Motor Arm, Left  0-->No drift, limb holds 90 (or 45) degrees for full 10 secs   5b. Motor Arm, Right  0-->No drift, limb holds 90 (or 45) degrees for full 10 secs   6a. Motor Leg, Left  0-->No drift, leg holds 30 degree position for full 5 secs   6b. Motor Leg, Right  0-->No drift, leg holds 30 degree position for full 5 secs   7. Limb Ataxia  0-->Absent   8. Sensory  0-->Normal, no sensory loss   9. Best Language  0-->No aphasia, normal   10. Dysarthria  0-->Normal   11. Extinction and Inattention (formerly Neglect)  0-->No abnormality   Total (NIH Stroke Scale)  0        Nursing reports episodes of periodic expressive aphasia. Pt's daughter also reported patient has episodes of staring off periodically at home. Some confusion on exam. MRI brain no evidence of acute infarctions. MRA brain/neck without evidence for significant focal stenosis or occlusion. Ordered EEG monitoring for overnight, will consider empirical treatment with keppra.     TTE completed showing EF 40 % and moderate left atrial enlargement.     Dr. Valdovinos spoke with daughter over the phone to update on POC.       Christos Lee, ERICP-C  [facility] - [id number]  Department of Vascular Neurology   [facility]

## 2019-08-27 NOTE — PLAN OF CARE
Problem: Occupational Therapy Goal  Goal: Occupational Therapy Goal  Goals to be met by: 7 days (9/2/19)     Patient will increase functional independence with ADLs by performing:    UE Dressing with Supervision.  LE Dressing with Stand-by Assistance.  Grooming while standing at sink with Stand-by Assistance.  Toileting from toilet with Stand-by Assistance for hygiene and clothing management.   Supine to sit with Stand-by Assistance.  Toilet transfer to toilet with Stand-by Assistance.  Complete functional mobility household distance with SBA using AD as needed.    Outcome: Ongoing (interventions implemented as appropriate)  Eval and POC set 8/27/19

## 2019-08-27 NOTE — SUBJECTIVE & OBJECTIVE
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    Hypertension     Hypertensive encephalopathy     IBS (irritable bowel syndrome) 6/21/2017    IGT (impaired glucose tolerance) 1/12/2016    Iron deficiency anemia secondary to inadequate dietary iron intake 6/24/2013    Joint pain     Keratoconjunctivitis sicca of both eyes not specified as Sjogren's 7/29/2016    Leiomyoma of uterus, unspecified 9/16/2014    Long QT interval 6/29/2016    Due to medication (plaquenil)     Macular edema 1/12/2015    Multinodular goiter 1/12/2016    Neuropathy 8/23/2017    Plaquenil causing adverse effect in therapeutic use 10/7/2016    Pneumococcal vaccine refused 8/17/2016    Pneumonia due to Streptococcus pneumoniae 4/5/2018    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/2015    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    s/p Right Total knee replacement 5/15/2013    Scoliosis of thoracic spine 5/15/2013    Small vessel disease, cerebrovascular 12/28/2017    Stroke     UTI (urinary tract infection) 12/29/2018    Vascular dementia 12/6/2017     Past Surgical History:   Procedure Laterality Date    BREAST CYST EXCISION      CATARACT EXTRACTION      COLONOSCOPY N/A 9/29/2015    [facility] (4TH FLR)    EGD (ESOPHAGOGASTRODUODENOSCOPY) N/A 3/11/2014    Performed by [facility] at [facility] END[facility] (4TH FLR)    EGD  (ESOPHAGOGASTRODUODENOSCOPY) N/A 11/5/2013    [facility]formed by [facility] (4TH FLR)    ESOPHAGOGASTRODUODENOSCOPY (EGD) N/A 10/4/2017    [facility]    EYE SURGERY      INJECTION-STEROID-EPIDURAL-TRANSFORAMINAL Right 9/20/2017    [facility]    JOINT REPLACEMENT      right knee    KNEE SURGERY Left 12/31/2013    TKR    left parotidectomy      mixed tumor    SALIVARY GLAND SURGERY      TONSILLECTOMY       Family History   Problem Relation Age of Onset    Hypertension Mother     Heart disease Mother     Prostate cancer Father         prostate cancer    Cancer Father     Breast cancer Maternal Grandmother      Social History     Tobacco Use    Smoking status: Never Smoker    Smokeless tobacco: Never Used   Substance Use Topics    Alcohol use: No     Alcohol/week: 0.0 oz     Frequency: Monthly or less     Drinks per session: 1 or 2     Binge frequency: Never     Comment: very seldom     Drug use: No     Review of patient's allergies indicates:  No Known Allergies    Medications: I have reviewed the current medication administration record.      (Not in a hospital admission)    Review of Systems   Constitutional: Negative for chills and fever.   HENT: Negative for ear discharge and ear pain.    Eyes: Negative for pain and itching.   Respiratory: Positive for shortness of breath.    Cardiovascular: Negative for chest pain and leg swelling.   Gastrointestinal: Negative for abdominal distention and abdominal pain.   Genitourinary: Negative for difficulty urinating and dysuria.   Musculoskeletal: Positive for arthralgias and neck pain.   Skin: Negative for rash and wound.   Neurological: Positive for speech difficulty and weakness.   Psychiatric/Behavioral: Positive for confusion.     Objective:     Vital Signs (Most Recent):  Temp: 98.3 °F (36.8 °C) (08/26/19 2250)  Pulse: 92 (08/27/19 0349)  Resp: 20 (08/27/19 0349)  BP:  (!) 175/72 (08/27/19 0218)  SpO2: 98 % (08/27/19 0349)    Vital Signs Range (Last 24H):  Temp:  [98.3 °F (36.8 °C)]   Pulse:  [87-96]   Resp:  [16-21]   BP: (148-205)/(72-88)   SpO2:  [94 %-99 %]     Physical Exam   Constitutional: She is oriented to person, place, and time. She appears well-developed and well-nourished.   HENT:   Head: Normocephalic.   Eyes: Pupils are equal, round, and reactive to light.   Neck: Normal range of motion.   Cardiovascular: Normal rate.   Pulmonary/Chest: Effort normal.   Abdominal: Soft.   Neurological: She is alert and oriented to person, place, and time.   Skin: Skin is warm and dry.   Nursing note and vitals reviewed.      Neurological Exam:   LOC: alert  Attention Span: Good   Language: No aphasia  Articulation: No dysarthria  Orientation: Person, Place, Time   Visual Fields: Full  EOM (CN III, IV, VI): Full/intact  Pupils (CN II, III): PERRL  Facial Sensation (CN V): Normal  Facial Movement (CN VII): Symmetric facial expression    Gag Reflex: present  Reflexes: 2+ throughout  Motor: Arm left  Normal 5/5  Leg left  Normal 5/5  Arm right  Normal 5/5  Leg right Normal 5/5  Cebellar: No evidence of appendicular or axial ataxia  Sensation: Intact to light touch, temperature and vibration  Tone: Normal tone throughout      Laboratory:  CMP:   Recent Labs   Lab 08/26/19  2357   CALCIUM 9.4   ALBUMIN 3.9   PROT 6.9   *   K 5.5*   CO2 25   CL 96   BUN 18   CREATININE 1.1   ALKPHOS 49*   ALT 11   AST 20   BILITOT 0.4     CBC:   Recent Labs   Lab 08/27/19  0057 08/27/19  0106   WBC 8.41  --    RBC 4.41  --    HGB 12.0  --    HCT 39.4 36     --    MCV 89  --    MCH 27.2  --    MCHC 30.5*  --      Lipid Panel: No results for input(s): CHOL, LDLCALC, HDL, TRIG in the last 168 hours.  Coagulation: No results for input(s): PT, INR, APTT in the last 168 hours.  Hgb A1C: No results for input(s): HGBA1C in the last 168 hours.  TSH: No results for input(s): TSH in the last 168  hours.    Diagnostic Results:      Brain imaging:  CT head w/o contrast 8-27-19 results:  No CT evidence of acute intracranial abnormality. Clinical correlation and further evaluation as warranted.    Chronic senescent and microvascular ischemic changes.  Stable small punctate remote lacunar-type infarcts of the bilateral basal ganglia.    Vessel Imaging:        Cardiac Evaluation:   EKG 8-26-19 results:  Normal sinus rhythm  Voltage criteria for left ventricular hypertrophy  Abnormal ECG  When compared with ECG of 14-APR-2019 11:31,  Premature ventricular complexes are no longer Present

## 2019-08-27 NOTE — ED NOTES
Pt resting in bed. VSS, RR equal and unlabored. Side rails up x2. Bed wheels log. Will continue to monitor

## 2019-08-27 NOTE — PLAN OF CARE
Bhargav Hirsch MD  PCP    [id number] - [facility] [place], [place] - [address] AT [facility] [address]  [address]  [place] [address]  Phone: [phone] Fax: [phone]    [facility] - [place], [place] - [address]  [address] [facility] [address]  Phone: [phone] Fax: [phone]       08/27/19 1623   Discharge Assessment   Assessment Type Discharge Planning Assessment   Confirmed/corrected address and phone number on facesheet? Yes   Assessment information obtained from? Patient   Prior to hospitilization cognitive status: Alert/Oriented;No Deficits   Prior to hospitalization functional status: Independent   Current cognitive status: Alert/Oriented   Current Functional Status:   (TBD)   Facility Arrived From: home   Lives With child(yash), adult   Able to Return to Prior Arrangements yes   Is patient able to care for self after discharge? Unable to determine at this time (comments)   Who are your caregiver(s) and their phone number(s)? Rosy Rosado Daughter     [phone]      Patient's perception of discharge disposition home or selfcare   Readmission Within the Last 30 Days no previous admission in last 30 days   Patient currently being followed by outpatient case management? No   Patient currently receives any other outside agency services? No   Equipment Currently Used at Home none   Do you have any problems affording any of your prescribed medications? No   Is the patient taking medications as prescribed? yes   Does the patient have transportation home? Yes   Transportation Anticipated family or friend will provide   Dialysis Name and Scheduled days N/A   Does the patient receive services at the [facility]mad[facility]? No   Discharge Plan A Home Health   Discharge Plan B Rehab   DME Needed Upon Discharge    (TBD)   Patient/Family in Agreement with Plan yes

## 2019-08-27 NOTE — PLAN OF CARE
Problem: Physical Therapy Goal  Goal: Physical Therapy Goal  Goals to be met by: 9/10/19     Patient will increase functional independence with mobility by performin. Supine to sit with Stand-by Assistance  2. Sit to supine with Stand-by Assistance  3. Sit to stand transfer with Supervision  4. Bed to chair transfer with Supervision using Rolling Walker  5. Gait  x 100 feet with Stand-by Assistance using Rolling Walker.   6. Ascend/descend 5 stair with bilateral Handrails Contact Guard Assistance    Outcome: Ongoing (interventions implemented as appropriate)  PT eval completed. Pt will begin PT POC.    Marilyn Jauregui, PT  2019

## 2019-08-27 NOTE — PT/OT/SLP EVAL
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  She presents with performance deficits including weakness, impaired endurance, impaired self care skills, impaired functional mobilty, gait instability, impaired balance, decreased upper extremity function, decreased lower extremity function, decreased safety awareness, decreased ROM, impaired cardiopulmonary response to activity. Pt would continue to benefit from OT to increase functional independence and safety. Recommend HH upon D/C.    Rehab Prognosis: Good; patient would benefit from acute skilled OT services to address these deficits and reach maximum level of function.       Plan:     Patient to be seen 4 x/week to address the above listed problems via self-care/home management, therapeutic activities, therapeutic exercises, neuromuscular re-education  · Plan of Care Expires: 09/27/19  · Plan of Care Reviewed with: patient, daughter, sibling    Subjective     Chief Complaint: Hungry  Patient/Family Comments/goals: Return home    Occupational Profile:  Living Environment: Pt's daughters live with her in 1-story house with 5 GLORIA and tub/shower combo.  Previous level of function: Uses RW for ambulation; requires assist with dressing and tub transfers  Equipment Used at Home:  bedside commode, walker, rolling  Assistance upon Discharge: Daughters and sister are able to provide assistance    Pain/Comfort:  · Pain Rating 1: 0/10  · Pain Rating Post-Intervention 1: 0/10    Patients cultural, spiritual, Anabaptism conflicts given the current situation:  no    Objective:     Communicated with: RN prior to session. Patient found with HOB elevated with telemetry, blood pressure cuff, peripheral IV, pulse ox (continuous) upon OT entry to room, family present.    General Precautions: Standard, fall   Orthopedic Precautions:N/A   Braces: N/A     Occupational Performance:    Bed Mobility:    · Patient completed Supine to Sit with minimum assistance-- bp while seated 169/90  · Patient completed Sit to Supine with minimum assistance    Functional Mobility/Transfers:  · Patient completed Sit <> Stand Transfer with SBA-CGA from stretcher 2 trials with rolling walker   · Functional Mobility: Few steps within room and then along EOB with SBA-CGA using RW and seated rest break due to fatigue    Activities of Daily Living:  · NT    Cognitive/Visual Perceptual:  Cognitive/Psychosocial Skills:     -       Oriented to: Person, Place, Time and Situation, although demo confusion at times  -       Follows Commands/attention: Follows one-step commands with delayed response at times  -       Communication: clear/fluent  -       Safety awareness/insight to disability: impaired   Visual/Perceptual:      -Intact      Physical Exam:  Balance:    -       good sitting, good-fair standing balance  Sensation: Intact B UE  Upper Extremity Range of Motion:     -       Right Upper Extremity: shld AROM ~90 deg; others WFL  -       Left Upper Extremity: shld AROM less than ~90 deg; others WFL  Upper Extremity Strength:    -       Right Upper Extremity: shld 3+/5; elbow flex/ext WFL  -       Left Upper Extremity: shld NT due to pain; elbow flex/ext WFL   Strength: B WFL  Fine Motor Coordination: NT    AMPAC 6 Click ADL:  AMPAC Total Score: 18    Treatment & Education:  OT eval; educated on OT role and POC and to call for assistance before getting up  Education:    Patient left with HOB elevated with all lines intact, call button in reach, RN notified and sister present    GOALS:    Multidisciplinary Problems     Occupational Therapy Goals        Problem: Occupational Therapy Goal    Goal Priority Disciplines Outcome Interventions   Occupational Therapy Goal     OT, PT/OT Ongoing (interventions implemented as appropriate)    Description:  Goals to be met by: 7 days (9/2/19)     Patient will increase functional independence with ADLs by performing:    UE Dressing with Supervision.  LE Dressing with Stand-by Assistance.  Grooming while standing at sink with Stand-by Assistance.  Toileting from toilet with Stand-by Assistance for hygiene and clothing management.   Supine to sit with Stand-by Assistance.  Toilet transfer to toilet with Stand-by Assistance.  Complete functional mobility household distance with SBA using AD as needed.                      History:     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    Hypertension     Hypertensive encephalopathy     IBS (irritable bowel syndrome) 6/21/2017    IGT (impaired glucose tolerance) 1/12/2016    Iron deficiency anemia secondary to inadequate dietary iron intake 6/24/2013    Joint pain     Keratoconjunctivitis sicca of both eyes not specified as Sjogren's 7/29/2016    Leiomyoma of uterus, unspecified 9/16/2014    Long QT interval 6/29/2016    Due to medication (plaquenil)     Macular edema 1/12/2015    Multinodular goiter 1/12/2016    Neuropathy 8/23/2017    Plaquenil causing adverse effect  in therapeutic use 10/7/2016    Pneumococcal vaccine refused 8/17/2016    Pneumonia due to Streptococcus pneumoniae 4/5/2018    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/2015    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    s/p Right Total knee replacement 5/15/2013    Scoliosis of thoracic spine 5/15/2013    Small vessel disease, cerebrovascular 12/28/2017    Stroke     UTI (urinary tract infection) 12/29/2018    Vascular dementia 12/6/2017       Past Surgical History:   Procedure Laterality Date    BREAST CYST EXCISION      CATARACT EXTRACTION      COLONOSCOPY N/A 9/29/2015    Performed by FIDELINA Sanchez [facility] at [facility]O (4TH FLR)    EGD (ESOPHAGOGASTRODUODENOSCOPY) N/A 3/11/2014    Performed by Federico Escobar MD at [facility] ENDO (4TH FLR)    EGD (ESOPHAGOGASTRODUODENOSCOPY) N/A 11/5/2013    Performed by Federico [facility]obar MD at [facility] ENDO (4TH FLR)    ESOPHAGOGASTRODUODENOSCOPY (EGD) N/A 10/4/2017    Per[facility] at [facility]    EYE SURGERY      INJECTION-STEROID-EPIDURAL-TRANSFORAMINAL Right 9/20/2017    Per[facility]elin [facility]dze [facility] at [facility] PAIN MGT    JOINT REPLACEMENT      right knee    KNEE SURGERY Left 12/31/2013    TKR    left parotidectomy      mixed tumor    SALIVARY GLAND SURGERY      TONSILLECTOMY         Time Tracking:     OT Date of Treatment: 08/27/19  OT Start Time: 0854  OT Stop Time: 0918  OT Total Time (min): 24 min    Billable Minutes:Evaluation 24 minutes    AYAD Michaud  8/27/2019

## 2019-08-28 ENCOUNTER — PATIENT MESSAGE (OUTPATIENT)
Dept: INTERNAL MEDICINE | Facility: CLINIC | Age: 84
End: 2019-08-28

## 2019-08-28 PROBLEM — G93.40 ACUTE ENCEPHALOPATHY: Status: ACTIVE | Noted: 2019-08-27

## 2019-08-28 PROBLEM — N18.30 CHRONIC RENAL FAILURE IN PEDIATRIC PATIENT, STAGE 3 (MODERATE): Status: RESOLVED | Noted: 2018-04-15 | Resolved: 2019-08-28

## 2019-08-28 LAB
ALBUMIN SERPL BCP-MCNC: 3.7 G/DL (ref 3.5–5.2)
ALP SERPL-CCNC: 45 U/L (ref 55–135)
ALT SERPL W/O P-5'-P-CCNC: 7 U/L (ref 10–44)
ANION GAP SERPL CALC-SCNC: 11 MMOL/L (ref 8–16)
APTT BLDCRRT: 25.5 SEC (ref 21–32)
AST SERPL-CCNC: 11 U/L (ref 10–40)
BASOPHILS # BLD AUTO: 0.04 K/UL (ref 0–0.2)
BASOPHILS NFR BLD: 0.5 % (ref 0–1.9)
BILIRUB SERPL-MCNC: 0.7 MG/DL (ref 0.1–1)
BUN SERPL-MCNC: 14 MG/DL (ref 8–23)
CALCIUM SERPL-MCNC: 9.3 MG/DL (ref 8.7–10.5)
CHLORIDE SERPL-SCNC: 98 MMOL/L (ref 95–110)
CK MB SERPL-MCNC: 0.9 NG/ML (ref 0.1–6.5)
CK MB SERPL-RTO: 2 % (ref 0–5)
CK SERPL-CCNC: 44 U/L (ref 20–180)
CO2 SERPL-SCNC: 25 MMOL/L (ref 23–29)
CREAT SERPL-MCNC: 1 MG/DL (ref 0.5–1.4)
DIFFERENTIAL METHOD: ABNORMAL
EOSINOPHIL # BLD AUTO: 0.1 K/UL (ref 0–0.5)
EOSINOPHIL NFR BLD: 1.6 % (ref 0–8)
ERYTHROCYTE [DISTWIDTH] IN BLOOD BY AUTOMATED COUNT: 13.7 % (ref 11.5–14.5)
EST. GFR  (AFRICAN AMERICAN): >60 ML/MIN/1.73 M^2
EST. GFR  (NON AFRICAN AMERICAN): 53 ML/MIN/1.73 M^2
GLUCOSE SERPL-MCNC: 89 MG/DL (ref 70–110)
HCT VFR BLD AUTO: 44.1 % (ref 37–48.5)
HGB BLD-MCNC: 13.5 G/DL (ref 12–16)
IMM GRANULOCYTES # BLD AUTO: 0.08 K/UL (ref 0–0.04)
IMM GRANULOCYTES NFR BLD AUTO: 1 % (ref 0–0.5)
INR PPP: 1 (ref 0.8–1.2)
LYMPHOCYTES # BLD AUTO: 2.1 K/UL (ref 1–4.8)
LYMPHOCYTES NFR BLD: 26.7 % (ref 18–48)
MAGNESIUM SERPL-MCNC: 1.8 MG/DL (ref 1.6–2.6)
MCH RBC QN AUTO: 27.3 PG (ref 27–31)
MCHC RBC AUTO-ENTMCNC: 30.6 G/DL (ref 32–36)
MCV RBC AUTO: 89 FL (ref 82–98)
MONOCYTES # BLD AUTO: 1.5 K/UL (ref 0.3–1)
MONOCYTES NFR BLD: 19.3 % (ref 4–15)
NEUTROPHILS # BLD AUTO: 4.1 K/UL (ref 1.8–7.7)
NEUTROPHILS NFR BLD: 50.9 % (ref 38–73)
NRBC BLD-RTO: 0 /100 WBC
PHOSPHATE SERPL-MCNC: 4 MG/DL (ref 2.7–4.5)
PLATELET # BLD AUTO: 186 K/UL (ref 150–350)
PMV BLD AUTO: 9.3 FL (ref 9.2–12.9)
POTASSIUM SERPL-SCNC: 4 MMOL/L (ref 3.5–5.1)
PROT SERPL-MCNC: 6.2 G/DL (ref 6–8.4)
PROTHROMBIN TIME: 10.5 SEC (ref 9–12.5)
RBC # BLD AUTO: 4.94 M/UL (ref 4–5.4)
SODIUM SERPL-SCNC: 134 MMOL/L (ref 136–145)
TROPONIN I SERPL DL<=0.01 NG/ML-MCNC: 0.01 NG/ML (ref 0–0.03)
WBC # BLD AUTO: 7.99 K/UL (ref 3.9–12.7)

## 2019-08-28 PROCEDURE — 82550 ASSAY OF CK (CPK): CPT

## 2019-08-28 PROCEDURE — 80053 COMPREHEN METABOLIC PANEL: CPT

## 2019-08-28 PROCEDURE — G0378 HOSPITAL OBSERVATION PER HR: HCPCS

## 2019-08-28 PROCEDURE — 85730 THROMBOPLASTIN TIME PARTIAL: CPT

## 2019-08-28 PROCEDURE — 85610 PROTHROMBIN TIME: CPT

## 2019-08-28 PROCEDURE — 84484 ASSAY OF TROPONIN QUANT: CPT

## 2019-08-28 PROCEDURE — 36415 COLL VENOUS BLD VENIPUNCTURE: CPT

## 2019-08-28 PROCEDURE — 94761 N-INVAS EAR/PLS OXIMETRY MLT: CPT

## 2019-08-28 PROCEDURE — 63600175 PHARM REV CODE 636 W HCPCS: Performed by: NURSE PRACTITIONER

## 2019-08-28 PROCEDURE — 99225 PR SUBSEQUENT OBSERVATION CARE,LEVEL II: ICD-10-PCS | Mod: ,,, | Performed by: PSYCHIATRY & NEUROLOGY

## 2019-08-28 PROCEDURE — 84100 ASSAY OF PHOSPHORUS: CPT

## 2019-08-28 PROCEDURE — 99225 PR SUBSEQUENT OBSERVATION CARE,LEVEL II: CPT | Mod: ,,, | Performed by: PSYCHIATRY & NEUROLOGY

## 2019-08-28 PROCEDURE — 82553 CREATINE MB FRACTION: CPT

## 2019-08-28 PROCEDURE — 85025 COMPLETE CBC W/AUTO DIFF WBC: CPT

## 2019-08-28 PROCEDURE — 25000003 PHARM REV CODE 250: Performed by: NURSE PRACTITIONER

## 2019-08-28 PROCEDURE — 83735 ASSAY OF MAGNESIUM: CPT

## 2019-08-28 RX ORDER — AMLODIPINE BESYLATE 5 MG/1
5 TABLET ORAL DAILY
Qty: 90 TABLET | Refills: 1 | Status: SHIPPED | OUTPATIENT
Start: 2019-08-28 | End: 2019-09-05 | Stop reason: HOSPADM

## 2019-08-28 RX ORDER — GABAPENTIN 300 MG/1
300 CAPSULE ORAL
Status: DISCONTINUED | OUTPATIENT
Start: 2019-08-28 | End: 2019-08-28

## 2019-08-28 RX ADMIN — HEPARIN SODIUM 5000 UNITS: 5000 INJECTION INTRAVENOUS; SUBCUTANEOUS at 06:08

## 2019-08-28 RX ADMIN — HEPARIN SODIUM 5000 UNITS: 5000 INJECTION INTRAVENOUS; SUBCUTANEOUS at 02:08

## 2019-08-28 RX ADMIN — MYCOPHENOLATE MOFETIL 500 MG: 250 CAPSULE ORAL at 10:08

## 2019-08-28 RX ADMIN — CARVEDILOL 12.5 MG: 12.5 TABLET, FILM COATED ORAL at 04:08

## 2019-08-28 RX ADMIN — AMLODIPINE BESYLATE 5 MG: 5 TABLET ORAL at 04:08

## 2019-08-28 RX ADMIN — SODIUM CHLORIDE 1000 ML: 0.9 INJECTION, SOLUTION INTRAVENOUS at 12:08

## 2019-08-28 RX ADMIN — HYDROXYCHLOROQUINE SULFATE 200 MG: 200 TABLET, FILM COATED ORAL at 10:08

## 2019-08-28 RX ADMIN — MONTELUKAST 10 MG: 10 TABLET, FILM COATED ORAL at 10:08

## 2019-08-28 NOTE — PROVIDER TRANSFER
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  A similar episode occurred in 12/2018.   Per vascular neurology MD: 'Patient with acute confusional state that sounds more like encephalopathy than aphasia.  Has a similar episode in Dec '18 with lateralizing EEG x 2 without epileptiform abnormalities.  MRI without acute pathology. Focal seizure (CPSz) high on differential so will pursue EEG'. EEG  without epileptiform changes.  The patient is somnolent on 8/28 after receiving baclofen 10 mg TID for 3 doses and only takes 5 mg at bedtime at home.  She also received seroquel on 8/27.  U/A negative on admit.  Vascular neurology does not feel these episodes represent TIA and are likely related to BP mediated changes.  General neurology evaluation recommended.  The patient did have a mild decline in EF to 40% on current echo.     To Do List:   -hold meds/diet until patient more alert/awake  -verify med changes with patient's daughter who manages her meds  -neurology consult  -evaluate for orthostasis causing BP variability   -monitor for volume overload    The patient has been accepted to Hospital Medicine who will assume care on 8/29 at 0700 at which time a team will be assigned.  The transferring service is aware of the provider change and will continue to follow until 8/29 at 0700.      Hayley Haney MD  Hospital Medicine Staff  Pager: [phone]

## 2019-08-28 NOTE — NURSING
Selma Lux has had small improvement in her LOC. She will answer in full sentence but inconsistently. She also follows commands inconsistently. She falls asleep in the middle of activity. She cannot stay awake to try to eat or take medications. I was daksha to find an alert moment this afternoon to have her swallow her Coreg and Norvasc. I will check her bp again later. She does move all extremities but only occasionally. She has not eaten or drank more than a tablespoon of apple juice so far today. She has been incontinent and 4 diapers were wet. I have spoken to her daughter multiple times and she understands the plan of care.

## 2019-08-28 NOTE — NURSING
"Pt very combative trying to climb out of bed. Pt moved from room 3091 to room 3082 to be closer to nurses station. Pt pulling at eeg monitor. Stroke team paged. Spoke with ijeoma regarding pt's situation. She stated she would put in for a one time dose of Seroquel. Pt refused to take meds at this time. Stated she would take medications at 2100. Pt also refusing vitals at this time. Pt stating "I can't believe how y'all are treating me." No direct needs voiced. Pt's daughter to be at bedside around 2100. Nadn; will attempt to give medications at 2100 as well as take vital signs. Sitter at bedside at this time. Will monitor.   "

## 2019-08-28 NOTE — NURSING
Vital signs abnormal when obtained for mn. Bp 72/36. Pt very drowsy and hard to arouse; pt in deep sleep. Stroke/ neuro team paged. Bolus ordered. Will monitor.

## 2019-08-28 NOTE — PROGRESS NOTES
[facility]  Vascular Neurology  Comprehensive Stroke Center  Progress Note    Assessment/Plan:     * Acute encephalopathy  82 y/o female with aphasia and dysarthria that has resolved, admitted for TIA work up. Baseline vascular dementia. Nursing reports episodes of periodic expressive aphasia. Family reported patient has episodes of staring off periodically at home. MRI brain no evidence of acute infarctions. MRA brain/neck without evidence for significant focal stenosis or occlusion. EEG moderate encephalopathy, no seizures.      Pt drowsy 8/28 likely due to oversedation with medication. Agitated overnight, 1 x Seroquel. Spoke to daughter regarding baclofen and gabapentin home dose, daughter states she made home dose adjustments to baclofen 5 mg at dinner and gabapentin 1 tab at night (daughter unsure of dose possibly 300 mg); daughter states pt was drowsy for 2 days when taking baclofen 10mg TID; currently held.     Transfer to  8/29 at 0700 with recs to consult general neurology.     Antithrombotics: DAPT (home meds)    Statins: Lipitor 40 mg daily    Aggressive risk factor modification: HTN, HLD     Rehab efforts: The patient has been evaluated by a stroke team provider and the therapy needs have been fully considered based off the presenting complaints and exam findings. The following therapy evaluations are needed: PT evaluate and treat, OT evaluate and treat, SLP evaluate and treat - dispo home health    Diagnostics ordered/pending: None     VTE prophylaxis: Heparin 5000 units SQ every 8 hours, SCD's    BP parameters: within normal range, avoid hypoperfusion, no acute infarct        Cryptogenic organizing pneumonia  Continue home meds    Chronic renal failure in pediatric patient, stage 3 (moderate)  stroke risk factor  Avoid nephrotoxins    Essential hypertension  stroke risk factor  SBP <180         8/28: Drowsy today, difficult neuro exam, likely r/t baclofen and gabapentin dose.  Hypotensive overnight, 1L bolus given. Agitated overnight, 1 x Seroquel. Spoke to daughter regarding baclofen and gabapentin orders, daughter states she made home dose adjustments to baclofen 5 mg at dinner and gabapentin 1 tab at night (daughter unsure of dose possibly 300 mg); currently held due to drowsiness. EEG moderate encephalopathy, no seizures, official read pending. Orthostatic BP ordered. Symptoms likely encephalopathic, transfer to  in morning.    STROKE DOCUMENTATION        NIH Scale:  1a. Level of Consciousness: 2-->Not alert, requires repeated stimulation to attend, or is obtunded and requires strong or painful stimulation to make movements (not stereotyped)  1b. LOC Questions: 2-->Answers neither question correctly  1c. LOC Commands: 2-->Performs neither task correctly  2. Best Gaze: 0-->Normal  3. Visual: 0-->No visual loss  4. Facial Palsy: 0-->Normal symmetrical movements  5a. Motor Arm, Left: 1-->Drift, limb holds 90 (or 45) degrees, but drifts down before full 10 seconds, does not hit bed or other support  5b. Motor Arm, Right: 1-->Drift, limb holds 90 (or 45) degrees, but drifts down before full 10 secs, does not hit bed or other support  6a. Motor Leg, Left: 1-->Drift, leg falls by the end of the 5-sec period but does not hit bed  6b. Motor Leg, Right: 1-->Drift, leg falls by the end of the 5-sec period but does not hit bed  7. Limb Ataxia: 0-->Absent  8. Sensory: 0-->Normal, no sensory loss  9. Best Language: 0-->No aphasia, normal  10. Dysarthria: 0-->Normal  11. Extinction and Inattention (formerly Neglect): 0-->No abnormality  Total (NIH Stroke Scale): 10       Modified Pool Score: 1  R[place] Coma Scale:    ABCD2 Score:    [id number] Score:   HAS -BLED Score:   ICH Score:   Hunt & Jain Classification:      Hemorrhagic change of an Ischemic Stroke: Does this patient have an ischemic stroke with hemorrhagic changes? No     Neurologic Chief Complaint: 30 min confusion, aphasia,  dysarthria    Subjective:     Interval History: Patient is seen for follow-up neurological assessment and treatment recommendations: Drowsy today, difficult neuro exam, likely r/t baclofen and gabapentin dose. Hypotensive overnight, 1L bolus given. Agitated overnight, 1 x Seroquel. Spoke to daughter regarding baclofen and gabapentin orders, daughter states she made home dose adjustments to baclofen 5 mg at dinner and gabapentin 1 tab at night (daughter unsure of dose possibly 300 mg); currently held due to drowsiness. EEG moderate encephalopathy, no seizures, official read pending. Orthostatic BP ordered. Symptoms likely encephalopathic, transfer to  in morning.    HPI, Past Medical, Family, and Social History remains the same as documented in the initial encounter.     Review of Systems   Constitutional: Positive for fatigue.   HENT: Negative for trouble swallowing.    Respiratory: Negative for shortness of breath.    Gastrointestinal: Negative for nausea and vomiting.   Neurological: Negative for speech difficulty and weakness.   Psychiatric/Behavioral: Positive for confusion.     Scheduled Meds:   amLODIPine  5 mg Oral Daily    aspirin  81 mg Oral Daily    atorvastatin  40 mg Oral Daily    carvedilol  12.5 mg Oral BID WM    clopidogrel  75 mg Oral Daily    heparin (porcine)  5,000 Units Subcutaneous Q8H    hydroxychloroquine  200 mg Oral BID    montelukast  10 mg Oral QHS    mycophenolate  500 mg Oral BID    pantoprazole  40 mg Oral Daily    predniSONE  1 mg Oral Daily     Continuous Infusions:   sodium chloride 0.9%       PRN Meds:acetaminophen, albuterol, labetalol, ondansetron, polyethylene glycol, sodium chloride 0.9%, sodium chloride 0.9%    Objective:     Vital Signs (Most Recent):  Temp: 97 °F (36.1 °C) (08/28/19 1118)  Pulse: (!) 112 (08/28/19 1513)  Resp: 16 (08/28/19 1118)  BP: (!) 165/81 (08/28/19 1118)  SpO2: 96 % (08/28/19 1118)  BP Location: Left arm    Vital Signs Range (Last  24H):  Temp:  [96.9 °F (36.1 °C)-98.6 °F (37 °C)]   Pulse:  []   Resp:  [16-18]   BP: ()/(36-95)   SpO2:  [95 %-100 %]   BP Location: Left arm    Physical Exam   Constitutional: She appears well-developed. She appears lethargic.   Eyes: EOM are normal.   Cardiovascular: Normal rate.   Pulmonary/Chest: Effort normal.   Neurological: She appears lethargic.   Skin: Skin is warm and dry.   Vitals reviewed.      Neurological Exam:   LOC: drowsy and likely due to medication  Attention Span: poor  Language: No aphasia  Articulation: No dysarthria  Orientation: Not oriented to place, Not oriented to time  EOM (CN III, IV, VI): Full/intact  Facial Movement (CN VII): Symmetric facial expression    Motor: Arm left  Paresis: 4/5  Leg left  Paresis: 4/5  Arm right  Paresis: 4/5  Leg right Paresis: 4/5  Sensation: Intact to light touch, temperature and vibration    Laboratory:  CMP:   Recent Labs   Lab 08/28/19  0608   CALCIUM 9.3   ALBUMIN 3.7   PROT 6.2   *   K 4.0   CO2 25   CL 98   BUN 14   CREATININE 1.0   ALKPHOS 45*   ALT 7*   AST 11   BILITOT 0.7     CBC:   Recent Labs   Lab 08/28/19  0608   WBC 7.99   RBC 4.94   HGB 13.5   HCT 44.1      MCV 89   MCH 27.3   MCHC 30.6*       Diagnostic Results     Brain imaging:  MRI brain 8/27/19  Cerebral volume loss with patchy and confluent T2 FLAIR signal abnormality supratentorial white matter and tatyana while nonspecific concerning for chronic ischemic change.    Small size remote bilateral cerebellar right pontine and left thalamic remote lacunar-type infarcts.    CT head w/o contrast 8-27-19 results:  No CT evidence of acute intracranial abnormality. Clinical correlation and further evaluation as warranted.    Chronic senescent and microvascular ischemic changes.  Stable small punctate remote lacunar-type infarcts of the bilateral basal ganglia.     Vessel Imaging:  MRA brain and neck 8/27/19  2-3 mm medially projecting outpouching right ophthalmic ICA  concerning for aneurysm versus infundibulum.  This could be further evaluated with CTA head imaging.  Otherwise unremarkable MRA of the head as detailed above specifically without evidence for focal stenosis or proximal occlusion.    Unremarkable motion limited MRA of the neck as detailed above without evidence for significant focal stenosis or occlusion.        Cardiac Evaluation:   TTE 8/28/19  · Concentric left ventricular hypertrophy. Mildly decreased left ventricular systolic function. The estimated ejection fraction is 40%  · Mild-to-moderate mitral regurgitation.  · Moderate left atrial enlargement.  · Indeterminate left ventricular diastolic function left ventricular diastolic dysfunction.  · Intermediate central venous pressure (8 mm Hg).    EKG 8-26-19 results:  Normal sinus rhythm  Voltage criteria for left ventricular hypertrophy  Abnormal ECG  When compared with ECG of 14-APR-2019 11:31,  Premature ventricular complexes are no longer Present         Christos Lee NP  [facility]  Department of Vascular Neurology   [facility]

## 2019-08-28 NOTE — SUBJECTIVE & OBJECTIVE
Neurologic Chief Complaint: 30 min confusion, aphasia, dysarthria    Subjective:     Interval History: Patient is seen for follow-up neurological assessment and treatment recommendations: Drowsy today, difficult neuro exam, likely r/t baclofen and gabapentin dose. Hypotensive overnight, 1L bolus given. Agitated overnight, 1 x Seroquel. Spoke to daughter regarding baclofen and gabapentin orders, daughter states she made home dose adjustments to baclofen 5 mg at dinner and gabapentin 1 tab at night (daughter unsure of dose possibly 300 mg); currently held due to drowsiness. EEG moderate encephalopathy, no seizures, official read pending. Orthostatic BP ordered. Symptoms likely encephalopathic, transfer to  in morning.    HPI, Past Medical, Family, and Social History remains the same as documented in the initial encounter.     Review of Systems   Constitutional: Positive for fatigue.   HENT: Negative for trouble swallowing.    Respiratory: Negative for shortness of breath.    Gastrointestinal: Negative for nausea and vomiting.   Neurological: Negative for speech difficulty and weakness.   Psychiatric/Behavioral: Positive for confusion.     Scheduled Meds:   amLODIPine  5 mg Oral Daily    aspirin  81 mg Oral Daily    atorvastatin  40 mg Oral Daily    carvedilol  12.5 mg Oral BID WM    clopidogrel  75 mg Oral Daily    heparin (porcine)  5,000 Units Subcutaneous Q8H    hydroxychloroquine  200 mg Oral BID    montelukast  10 mg Oral QHS    mycophenolate  500 mg Oral BID    pantoprazole  40 mg Oral Daily    predniSONE  1 mg Oral Daily     Continuous Infusions:   sodium chloride 0.9%       PRN Meds:acetaminophen, albuterol, labetalol, ondansetron, polyethylene glycol, sodium chloride 0.9%, sodium chloride 0.9%    Objective:     Vital Signs (Most Recent):  Temp: 97 °F (36.1 °C) (08/28/19 1118)  Pulse: (!) 112 (08/28/19 1513)  Resp: 16 (08/28/19 1118)  BP: (!) 165/81 (08/28/19 1118)  SpO2: 96 % (08/28/19 1118)  BP  Location: Left arm    Vital Signs Range (Last 24H):  Temp:  [96.9 °F (36.1 °C)-98.6 °F (37 °C)]   Pulse:  []   Resp:  [16-18]   BP: ()/(36-95)   SpO2:  [95 %-100 %]   BP Location: Left arm    Physical Exam   Constitutional: She appears well-developed. She appears lethargic.   Eyes: EOM are normal.   Cardiovascular: Normal rate.   Pulmonary/Chest: Effort normal.   Neurological: She appears lethargic.   Skin: Skin is warm and dry.   Vitals reviewed.      Neurological Exam:   LOC: drowsy and likely due to medication  Attention Span: poor  Language: No aphasia  Articulation: No dysarthria  Orientation: Not oriented to place, Not oriented to time  EOM (CN III, IV, VI): Full/intact  Facial Movement (CN VII): Symmetric facial expression    Motor: Arm left  Paresis: 4/5  Leg left  Paresis: 4/5  Arm right  Paresis: 4/5  Leg right Paresis: 4/5  Sensation: Intact to light touch, temperature and vibration    Laboratory:  CMP:   Recent Labs   Lab 08/28/19  0608   CALCIUM 9.3   ALBUMIN 3.7   PROT 6.2   *   K 4.0   CO2 25   CL 98   BUN 14   CREATININE 1.0   ALKPHOS 45*   ALT 7*   AST 11   BILITOT 0.7     CBC:   Recent Labs   Lab 08/28/19  0608   WBC 7.99   RBC 4.94   HGB 13.5   HCT 44.1      MCV 89   MCH 27.3   MCHC 30.6*       Diagnostic Results     Brain imaging:  MRI brain 8/27/19  Cerebral volume loss with patchy and confluent T2 FLAIR signal abnormality supratentorial white matter and tatyana while nonspecific concerning for chronic ischemic change.    Small size remote bilateral cerebellar right pontine and left thalamic remote lacunar-type infarcts.    CT head w/o contrast 8-27-19 results:  No CT evidence of acute intracranial abnormality. Clinical correlation and further evaluation as warranted.    Chronic senescent and microvascular ischemic changes.  Stable small punctate remote lacunar-type infarcts of the bilateral basal ganglia.     Vessel Imaging:  MRA brain and neck 8/27/19  2-3 mm medially  projecting outpouching right ophthalmic ICA concerning for aneurysm versus infundibulum.  This could be further evaluated with CTA head imaging.  Otherwise unremarkable MRA of the head as detailed above specifically without evidence for focal stenosis or proximal occlusion.    Unremarkable motion limited MRA of the neck as detailed above without evidence for significant focal stenosis or occlusion.        Cardiac Evaluation:   TTE 8/28/19  · Concentric left ventricular hypertrophy. Mildly decreased left ventricular systolic function. The estimated ejection fraction is 40%  · Mild-to-moderate mitral regurgitation.  · Moderate left atrial enlargement.  · Indeterminate left ventricular diastolic function left ventricular diastolic dysfunction.  · Intermediate central venous pressure (8 mm Hg).    EKG 8-26-19 results:  Normal sinus rhythm  Voltage criteria for left ventricular hypertrophy  Abnormal ECG  When compared with ECG of 14-APR-2019 11:31,  Premature ventricular complexes are no longer Present

## 2019-08-28 NOTE — HOSPITAL COURSE
8/28: Drowsy today, difficult neuro exam, likely r/t baclofen and gabapentin dose. Hypotensive overnight, 1L bolus given. Agitated overnight, 1 x Seroquel. Spoke to daughter regarding baclofen and gabapentin orders, daughter states she made home dose adjustments to baclofen 5 mg at dinner and gabapentin 1 tab at night (daughter unsure of dose possibly 300 mg); currently held due to drowsiness. EEG moderate encephalopathy, no seizures, official read pending. Orthostatic BP ordered. Symptoms likely encephalopathic, transfer to  in morning.

## 2019-08-28 NOTE — PT/OT/SLP PROGRESS
Speech Language Pathology      Selma Lux MD  MRN: [id number]    Patient not seen today secondary to Other (Comment)(somnolent/unable to rouse). Will follow-up 8/29.    Do Simmons MA, CCC-SLP

## 2019-08-28 NOTE — NURSING
Mrs. Lux remains somnolent this am. She will arouse and murmur answer to yes/no questions. She will be supported while she has a lower level of consciousness. I have applied DIMPLE and SCD's, provided mouth care and will turn q2hour, and incontinence care.

## 2019-08-28 NOTE — NURSING
Almost immediately pt's blood pressure regulates at 103/62. Pt still drowsy. Bolus infusing at this time. Pt's daughter states that at night pt receives neurontin around 5pm and only receives 5mg of baclofen at night because she will sleep too long. Pt's daughter also stated that a low bp of upper 90's to low 100's is normal for the pt. No direct needs voiced. Will monitor pt closely for change in status.

## 2019-08-28 NOTE — NURSING
"Selma Lux is more alert than earlier, she is answering with full sentences but will not open her eyes or follow any command. She is moving all extremities. Her daughter told me her mother will not respond to any name other than "Dr. Lux". Her latest bp is 165/81 but that is in a range she has been in this admission. She has not been alert enough to take medication yet and swallowing evaluation not done yet.  "

## 2019-08-28 NOTE — PLAN OF CARE
Problem: Adult Inpatient Plan of Care  Goal: Plan of Care Review  Outcome: Ongoing (interventions implemented as appropriate)  Plan of care reviewed with pt and daughter. Daughter verbalized understanding. Positioning for comfort. 24hr eeg monitoring in progress. seroquel given for agitation/ restless. Bolus given for low bp. No direct needs voiced. Nadn; will continue to monitor. Will report off to oncoming shift.

## 2019-08-28 NOTE — ASSESSMENT & PLAN NOTE
82 y/o female with aphasia and dysarthria that has resolved, admitted for TIA work up. Baseline vascular dementia. Nursing reports episodes of periodic expressive aphasia. Family reported patient has episodes of staring off periodically at home. MRI brain no evidence of acute infarctions. MRA brain/neck without evidence for significant focal stenosis or occlusion. EEG moderate encephalopathy, no seizures.      Pt drowsy 8/28 likely due to oversedation with medication. Agitated overnight, 1 x Seroquel. Spoke to daughter regarding baclofen and gabapentin home dose, daughter states she made home dose adjustments to baclofen 5 mg at dinner and gabapentin 1 tab at night (daughter unsure of dose possibly 300 mg); daughter states pt was drowsy for 2 days when taking baclofen 10mg TID; currently held.     Transfer to  8/29 at 0700 with recs to consult general neurology.     Antithrombotics: DAPT (home meds)    Statins: Lipitor 40 mg daily    Aggressive risk factor modification: HTN, HLD     Rehab efforts: The patient has been evaluated by a stroke team provider and the therapy needs have been fully considered based off the presenting complaints and exam findings. The following therapy evaluations are needed: PT evaluate and treat, OT evaluate and treat, SLP evaluate and treat - dispo home health    Diagnostics ordered/pending: None     VTE prophylaxis: Heparin 5000 units SQ every 8 hours, SCD's    BP parameters: within normal range, avoid hypoperfusion, no acute infarct

## 2019-08-28 NOTE — PROCEDURES
[facility] EEG/VIDEO MONITORING REPORT  Selma Lux MD  [id number]  [id number]    DATE OF SERVICE: 8/28/2019  DATE OF ADMISSION: 8/26/2019 10:59 PM    ADMITTING/REQUESTING PROVIDER: Ugo Valdovinos MD    REASON FOR CONSULT:  83-year-old woman with intermittent language difficulties.  Evaluate for evidence of epileptiform activity.    METHODOLOGY   Electroencephalographic (EEG) recording is with electrodes placed according to the International 10-20 placement system.  Thirty two (32) channels of digital signal (sampling rate of 512/sec) including T1 and T2 was simultaneously recorded from the scalp and may include  EKG, EMG, and/or eye monitors.  Recording band pass was 0.1 to 512 hz.  Digital video recording of the patient is simultaneously recorded with the EEG.  The patient is instructed report clinical symptoms which may occur during the recording session.  EEG and video recording is stored and archived in digital format.  Activation procedures which include photic stimulation, hyperventilation and instructing patients to perform simple task are done in selected patients.   The EEG is displayed on a monitor screen and can be reviewed using different montages.  Computer assisted analysis is employed to detect spike and electrographic seizure activity.   The entire record is submitted for computer analysis.  The entire recording is visually reviewed and the times identified by computer analysis as being spikes or seizures are reviewed again.  Compresses spectral analysis (CSA) is also performed on the activity recorded from each individual channel.  This is displayed as a power display of frequencies from 0 to 30 Hz over time.   The CSA is reviewed looking for asymmetries in power between homologous areas of the scalp and then compared with the original EEG recording.     Feusd software is also utilized in the review of this study.  This software suite analyzes the EEG recording in multiple domains.   Coherence and rhythmicity is computed to identify EEG sections which may contain organized seizures.  Each channel undergoes analysis to detect presence of spike and sharp waves which have special and morphological characteristic of epileptic activity.  The routine EEG recording is converted from spacial into frequency domain.  This is then displayed comparing homologous areas to identify areas of significant asymmetry.  Algorithm to identify non-cortically generated artifact is used to separate eye movement, EMG and other artifact from the EEG.      RECORDING TIMES  Start on 08/27/2019 at 17:27 p.m.  Stop on 08/18/2019 at 13:17 p.m.  A total of 19 hr and 31 min of EEG recording is obtained.    EEG FINDINGS  Background activity:   The waking background is slow, disorganized, continuous, moderate voltage, polymorphic, predominantly 4-6 hz theta activity with plenty of admixed delta slowing.  There are bilateral independent bursts of more prominent slowing.  There are bursts of generalized frontally predominant intermittent rhythmic delta activity (FIRDA).    There are no pushbutton activations.    Sleep:  There is evidence of state changes with periods with more generalized background suppression however there is no normal sleep architecture.    Activation procedures:   Hyperventilation is not performed  Photic stimulation is not performed    Cardiac Monitor:   On a single lead EKG, irregular heart rate with frequent PVCs and episodes of tachycardia.    Impression:   This is an abnormal continuous EEG monitoring study because of generalized background slowing consistent with a moderate encephalopathy.  This finding is nonspecific with regards to etiology but can be seen in the setting of toxic/metabolic derangements, infection, and as a medication effect.  There are periods with more pronounced generalized and focal slowing consistent with subcortical or deep midline dysfunction which is a nonspecific finding but is  commonly seen in the setting of vascular disease in this age group.   There are no pushbutton activations, no epileptiform features, and no electrographic seizures.  When compared to the previous EEG on 12/22/2018, there is no marked asymmetry between the hemispheres observed during this recording session.     Brianda Goss MD PhD  Neurology-Epilepsy  [facility].  [facility]

## 2019-08-29 ENCOUNTER — TELEPHONE (OUTPATIENT)
Dept: RHEUMATOLOGY | Facility: CLINIC | Age: 84
End: 2019-08-29

## 2019-08-29 LAB
ALBUMIN SERPL BCP-MCNC: 3.7 G/DL (ref 3.5–5.2)
ALP SERPL-CCNC: 48 U/L (ref 55–135)
ALT SERPL W/O P-5'-P-CCNC: 10 U/L (ref 10–44)
ANION GAP SERPL CALC-SCNC: 16 MMOL/L (ref 8–16)
ANISOCYTOSIS BLD QL SMEAR: SLIGHT
AST SERPL-CCNC: 16 U/L (ref 10–40)
BASOPHILS # BLD AUTO: 0.09 K/UL (ref 0–0.2)
BASOPHILS NFR BLD: 0.8 % (ref 0–1.9)
BILIRUB SERPL-MCNC: 0.9 MG/DL (ref 0.1–1)
BUN SERPL-MCNC: 21 MG/DL (ref 8–23)
CALCIUM SERPL-MCNC: 9.2 MG/DL (ref 8.7–10.5)
CHLORIDE SERPL-SCNC: 100 MMOL/L (ref 95–110)
CO2 SERPL-SCNC: 18 MMOL/L (ref 23–29)
CREAT SERPL-MCNC: 1.1 MG/DL (ref 0.5–1.4)
DIFFERENTIAL METHOD: ABNORMAL
EOSINOPHIL # BLD AUTO: 0.2 K/UL (ref 0–0.5)
EOSINOPHIL NFR BLD: 1.4 % (ref 0–8)
ERYTHROCYTE [DISTWIDTH] IN BLOOD BY AUTOMATED COUNT: 14.1 % (ref 11.5–14.5)
EST. GFR  (AFRICAN AMERICAN): 54.4 ML/MIN/1.73 M^2
EST. GFR  (NON AFRICAN AMERICAN): 47.2 ML/MIN/1.73 M^2
GLUCOSE SERPL-MCNC: 51 MG/DL (ref 70–110)
HCT VFR BLD AUTO: 51.3 % (ref 37–48.5)
HGB BLD-MCNC: 15.2 G/DL (ref 12–16)
IMM GRANULOCYTES # BLD AUTO: 0.12 K/UL (ref 0–0.04)
IMM GRANULOCYTES NFR BLD AUTO: 1.1 % (ref 0–0.5)
LYMPHOCYTES # BLD AUTO: 2.3 K/UL (ref 1–4.8)
LYMPHOCYTES NFR BLD: 20.8 % (ref 18–48)
MCH RBC QN AUTO: 27.5 PG (ref 27–31)
MCHC RBC AUTO-ENTMCNC: 29.6 G/DL (ref 32–36)
MCV RBC AUTO: 93 FL (ref 82–98)
MONOCYTES # BLD AUTO: 2.7 K/UL (ref 0.3–1)
MONOCYTES NFR BLD: 24.4 % (ref 4–15)
NEUTROPHILS # BLD AUTO: 5.6 K/UL (ref 1.8–7.7)
NEUTROPHILS NFR BLD: 51.5 % (ref 38–73)
NRBC BLD-RTO: 0 /100 WBC
PLATELET # BLD AUTO: 182 K/UL (ref 150–350)
PLATELET BLD QL SMEAR: ABNORMAL
PMV BLD AUTO: 9.5 FL (ref 9.2–12.9)
POIKILOCYTOSIS BLD QL SMEAR: SLIGHT
POTASSIUM SERPL-SCNC: 4.5 MMOL/L (ref 3.5–5.1)
PROT SERPL-MCNC: 6.4 G/DL (ref 6–8.4)
RBC # BLD AUTO: 5.52 M/UL (ref 4–5.4)
SODIUM SERPL-SCNC: 134 MMOL/L (ref 136–145)
T3FREE SERPL-MCNC: 2.3 PG/ML (ref 2.3–4.2)
T4 SERPL-MCNC: 5.8 UG/DL (ref 4.5–11.5)
WBC # BLD AUTO: 10.86 K/UL (ref 3.9–12.7)

## 2019-08-29 PROCEDURE — 36415 COLL VENOUS BLD VENIPUNCTURE: CPT

## 2019-08-29 PROCEDURE — 97530 THERAPEUTIC ACTIVITIES: CPT

## 2019-08-29 PROCEDURE — 87040 BLOOD CULTURE FOR BACTERIA: CPT | Mod: 59

## 2019-08-29 PROCEDURE — 63600175 PHARM REV CODE 636 W HCPCS: Performed by: NURSE PRACTITIONER

## 2019-08-29 PROCEDURE — 25000003 PHARM REV CODE 250: Performed by: NURSE PRACTITIONER

## 2019-08-29 PROCEDURE — 85025 COMPLETE CBC W/AUTO DIFF WBC: CPT

## 2019-08-29 PROCEDURE — 97535 SELF CARE MNGMENT TRAINING: CPT

## 2019-08-29 PROCEDURE — 92526 ORAL FUNCTION THERAPY: CPT

## 2019-08-29 PROCEDURE — G0378 HOSPITAL OBSERVATION PER HR: HCPCS

## 2019-08-29 PROCEDURE — 99220 PR INITIAL OBSERVATION CARE,LEVL III: ICD-10-PCS | Mod: ,,, | Performed by: HOSPITALIST

## 2019-08-29 PROCEDURE — 63600175 PHARM REV CODE 636 W HCPCS: Performed by: HOSPITALIST

## 2019-08-29 PROCEDURE — 84436 ASSAY OF TOTAL THYROXINE: CPT

## 2019-08-29 PROCEDURE — 99214 OFFICE O/P EST MOD 30 MIN: CPT | Mod: GC,,, | Performed by: PSYCHIATRY & NEUROLOGY

## 2019-08-29 PROCEDURE — 84481 FREE ASSAY (FT-3): CPT

## 2019-08-29 PROCEDURE — 99214 PR OFFICE/OUTPT VISIT, EST, LEVL IV, 30-39 MIN: ICD-10-PCS | Mod: GC,,, | Performed by: PSYCHIATRY & NEUROLOGY

## 2019-08-29 PROCEDURE — 99220 PR INITIAL OBSERVATION CARE,LEVL III: CPT | Mod: ,,, | Performed by: HOSPITALIST

## 2019-08-29 PROCEDURE — 80053 COMPREHEN METABOLIC PANEL: CPT

## 2019-08-29 RX ADMIN — MYCOPHENOLATE MOFETIL 500 MG: 250 CAPSULE ORAL at 09:08

## 2019-08-29 RX ADMIN — ASPIRIN 81 MG: 81 TABLET, COATED ORAL at 09:08

## 2019-08-29 RX ADMIN — ACETAMINOPHEN 650 MG: 325 TABLET ORAL at 09:08

## 2019-08-29 RX ADMIN — MONTELUKAST 10 MG: 10 TABLET, FILM COATED ORAL at 09:08

## 2019-08-29 RX ADMIN — AMLODIPINE BESYLATE 5 MG: 5 TABLET ORAL at 09:08

## 2019-08-29 RX ADMIN — PREDNISONE 25 MG: 5 TABLET ORAL at 11:08

## 2019-08-29 RX ADMIN — CARVEDILOL 12.5 MG: 12.5 TABLET, FILM COATED ORAL at 09:08

## 2019-08-29 RX ADMIN — CARVEDILOL 12.5 MG: 12.5 TABLET, FILM COATED ORAL at 05:08

## 2019-08-29 RX ADMIN — HYDROXYCHLOROQUINE SULFATE 200 MG: 200 TABLET, FILM COATED ORAL at 09:08

## 2019-08-29 RX ADMIN — HEPARIN SODIUM 5000 UNITS: 5000 INJECTION INTRAVENOUS; SUBCUTANEOUS at 06:08

## 2019-08-29 RX ADMIN — HEPARIN SODIUM 5000 UNITS: 5000 INJECTION INTRAVENOUS; SUBCUTANEOUS at 03:08

## 2019-08-29 RX ADMIN — HEPARIN SODIUM 5000 UNITS: 5000 INJECTION INTRAVENOUS; SUBCUTANEOUS at 09:08

## 2019-08-29 RX ADMIN — CLOPIDOGREL BISULFATE 75 MG: 75 TABLET, FILM COATED ORAL at 09:08

## 2019-08-29 NOTE — PLAN OF CARE
Pt resting quietly in bed with daughter, Rosy Rosado ([phone]), at the bedside when CM rounded. Patient was admitted with acute encephalopathy. CM was informed by Rosy that she lives in [place] but has been staying with the patient in her home for the past 18 months. Patient only uses a RW to assist with ambulation, was discharged from [facility] earlier this year, & has received home health care from [facility]  in the past. Patient  has a private duty sitter 6 hrs a day once a week & was scheduled to start the [facility] on 9/3/19 with hopes that she will be able to participate 3 days a week. NATALIA informed Dr. Singer that Rosy was eager to obtain the patient's EEG results. PT/OT recommending  care following discharge. Previously scheduled appointment with Dr. Bhargav Lee (PCP) on 9/9/19 at 1440 noted.

## 2019-08-29 NOTE — PROGRESS NOTES
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  She noted that she had a doctor's appointment earlier today and appeared normal, went home, ate a normal dinner, but then approximately 2 hr later at about 9:00 p.m. the patient shouted for help and her daughter found her in the hallway hunched over her walker.  She states that she appeared very weak, and when she sat her down on the toilet to rest the patient was unable to finish a sentence because of confusion, and then began to have slurred speech.  This whole episode lasted approximately 30 min, and while debating coming to the ED a resolved.  The daughter notes a previous episode similar to this or in December 2018, she was admitted with encephalopathy, there is concern about hypertensive emergency versus viral meningitis as a cause.  Patient has a long complicated medical history. She sees Dr Giang for cervical dystonia and Botox injections and Dr Tobias for memory loss. Upon examination but still slightly confused but moving all extremities.  Patient was initially admitted to the vascular Neurology service to rule out TIA.  MRI/MRA were negative for acute ischemia.  Patient underwent EEG which did not show epileptiform abnormalities.  However according to vascular Neurology, due to patient's prior episode with similar symptoms in December 2018, there is a concern for possible seizures.  Patient also has superimposed toxic encephalopathy from baclofen and gabapentin overuse.  Neurology consulted    Interval hx:   Pt was seen and examined at bedside.  Overnight and this morning, patient  remained very somnolent.  She was easy to arouse, however, unable to follow commands hold a conversation.  She frequently dozed off during the interview.  Patient had a T-max of 100.1°, and was given Tylenol after which temperature decreased to 99.6.  Patient was also slightly tachycardic with HR and low 100s.  Otherwise hemodynamically stable.    ROS (Positive in Bold, otherwise negative)  Unable to obtain due to encephalopathy    PEx   Temp:  [97 °F (36.1 °C)-100.1 °F (37.8 °C)]   Pulse:  []   Resp:  [16-19]   BP: (107-178)/(58-91)   SpO2:  [93 %-96 %]      I & O (Last 24H):     Intake/Output Summary (Last 24 hours) at 8/29/2019 1421  Last data filed at 8/29/2019 1300  Gross per 24 hour   Intake 120 ml   Output 1 ml   Net 119 ml       General:  female  in no acute distress. Somnolent. Resting in bed. Cooperative.  HEENT: NCAT. PERRL. EOMI. Sclera Anicteric.  CVS: RRR. Normal S1 S2. No murmurs  Pulm: CTAB. Normal respiratory effort. No wheezes, rhonchi, or crackles.  Abdomen: Soft. Non-distended. No tenderness to palpation. No rebound or guarding. +BS.  Extremities: No edema. No cyanosis. Full ROM.  Neuro: Alert, oriented x 4, Spont mvt of all extremities with no focal deficits noted.    Recent Results (from the past 24 hour(s))   Comprehensive metabolic panel    Collection Time: 08/29/19  4:41 AM   Result Value Ref Range    Sodium 134 (L) 136 - 145 mmol/L    Potassium 4.5 3.5 - 5.1 mmol/L    Chloride 100 95 - 110 mmol/L    CO2 18 (L) 23 - 29 mmol/L    Glucose 51 (L) 70 - 110 mg/dL    BUN, Bld 21 8 - 23 mg/dL    Creatinine 1.1 0.5 - 1.4 mg/dL    Calcium 9.2 8.7 - 10.5 mg/dL    Total Protein 6.4 6.0 - 8.4 g/dL    Albumin 3.7 3.5 - 5.2 g/dL    Total Bilirubin 0.9 0.1 - 1.0 mg/dL    Alkaline Phosphatase 48 (L) 55 - 135 U/L    AST 16 10 - 40 U/L    ALT 10 10 - 44 U/L    Anion Gap 16 8 - 16 mmol/L    eGFR if African American 54.4 (A) >60 mL/min/1.73 m^2    eGFR if non  47.2 (A) >60  mL/min/1.73 m^2   CBC auto differential    Collection Time: 08/29/19  8:28 AM   Result Value Ref Range    WBC 10.86 3.90 - 12.70 K/uL    RBC 5.52 (H) 4.00 - 5.40 M/uL    Hemoglobin 15.2 12.0 - 16.0 g/dL    Hematocrit 51.3 (H) 37.0 - 48.5 %    Mean Corpuscular Volume 93 82 - 98 fL    Mean Corpuscular Hemoglobin 27.5 27.0 - 31.0 pg    Mean Corpuscular Hemoglobin Conc 29.6 (L) 32.0 - 36.0 g/dL    RDW 14.1 11.5 - 14.5 %    Platelets 182 150 - 350 K/uL    MPV 9.5 9.2 - 12.9 fL    Immature Granulocytes 1.1 (H) 0.0 - 0.5 %    Gran # (ANC) 5.6 1.8 - 7.7 K/uL    Immature Grans (Abs) 0.12 (H) 0.00 - 0.04 K/uL    Lymph # 2.3 1.0 - 4.8 K/uL    Mono # 2.7 (H) 0.3 - 1.0 K/uL    Eos # 0.2 0.0 - 0.5 K/uL    Baso # 0.09 0.00 - 0.20 K/uL    nRBC 0 0 /100 WBC    Gran% 51.5 38.0 - 73.0 %    Lymph% 20.8 18.0 - 48.0 %    Mono% 24.4 (H) 4.0 - 15.0 %    Eosinophil% 1.4 0.0 - 8.0 %    Basophil% 0.8 0.0 - 1.9 %    Platelet Estimate Appears normal     Aniso Slight     Poik Slight     Differential Method Automated        No results for input(s): POCTGLUCOSE in the last 168 hours.    Hemoglobin A1C   Date Value Ref Range Status   08/27/2019 5.6 4.0 - 5.6 % Final     Comment:     ADA Screening Guidelines:  5.7-6.4%  Consistent with prediabetes  >or=6.5%  Consistent with diabetes  High levels of fetal hemoglobin interfere with the HbA1C  assay. Heterozygous hemoglobin variants (HbS, HgC, etc)do  not significantly interfere with this assay.   However, presence of multiple variants may affect accuracy.     03/13/2019 5.7 (H) 4.0 - 5.6 % Final     Comment:     ADA Screening Guidelines:  5.7-6.4%  Consistent with prediabetes  >or=6.5%  Consistent with diabetes  High levels of fetal hemoglobin interfere with the HbA1C  assay. Heterozygous hemoglobin variants (HbS, HgC, etc)do  not significantly interfere with this assay.   However, presence of multiple variants may affect accuracy.     08/24/2017 5.5 4.0 - 5.6 % Final     Comment:     According to ADA  guidelines, hemoglobin A1c <7.0% represents  optimal control in non-pregnant diabetic patients. Different  metrics may apply to specific patient populations.   Standards of Medical Care in Diabetes-2016.  For the purpose of screening for the presence of diabetes:  <5.7%     Consistent with the absence of diabetes  5.7-6.4%  Consistent with increasing risk for diabetes   (prediabetes)  >or=6.5%  Consistent with diabetes  Currently, no consensus exists for use of hemoglobin A1c  for diagnosis of diabetes for children.  This Hemoglobin A1c assay has significant interference with fetal   hemoglobin   (HbF). The results are invalid for patients with abnormal amounts of   HbF,   including those with known Hereditary Persistence   of Fetal Hemoglobin. Heterozygous hemoglobin variants (HbAS, HbAC,   HbAD, HbAE, HbA2) do not significantly interfere with this assay;   however, presence of multiple variants in a sample may impact the %   interference.          Active Hospital Problems    Diagnosis  POA    *Acute encephalopathy [G93.40]  Yes    Cryptogenic organizing pneumonia [J84.116]  Yes     Clinically suspected due to recurring infiltrates on CXR in the setting of rheumatoid arthritis.  Work up for infection has been negative and the clinical picture is less consistent with acute infection.  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    · Slowly wean prednisone as tolerated.  · Now on sulfasalazine as per Dr. Rouse (Rheumatology).  It is probably reasonable to avoid biologics unless absolutely necessary given prior flare after infliximab.  · Repeat CXR from 12/25/2018 shows continued clearing.  · Consider repeat chest CT (non-contrast) once prednisone is at stable lowest dose possible.      Altered mental status [R41.82]  Yes    Chronic renal failure syndrome, stage 3 (moderate)  [N18.3]  Yes    Essential hypertension [I10]  Yes      Resolved Hospital Problems   No resolved problems to display.          Assessment and Plan for problems addressed today:      amLODIPine  5 mg Oral Daily    aspirin  81 mg Oral Daily    atorvastatin  40 mg Oral Daily    carvedilol  12.5 mg Oral BID WM    clopidogrel  75 mg Oral Daily    heparin (porcine)  5,000 Units Subcutaneous Q8H    hydroxychloroquine  200 mg Oral BID    montelukast  10 mg Oral QHS    mycophenolate  500 mg Oral BID    pantoprazole  40 mg Oral Daily    predniSONE  25 mg Oral Daily     acetaminophen, albuterol, labetalol, ondansetron, polyethylene glycol, sodium chloride 0.9%, sodium chloride 0.9%    Acute toxic/metabolic Encephalopathy:  -Etiology:  Infectious vs. Neurologic vs. Iatrogenic  -CT head, MRI/MRA brain nonacute.  Cerebral volume loss concerning for chronic ischemic changes  -EEG without any epileptiform activity, but does show slowing consistent with moderate encephalopathy  -Medication additions or changes could be affecting presentation, S patient was given multiple doses of baclofen and gabapentin.  Hold any sedating medications at this time.  -Cardiac causes such as ACS, and/or CHF could be contributing, although does not appear to be increased peripheral edema, JVD or hepatojugular reflux on exam.  Trops were negative, No  EKG changes or significnat Hx/physical for ACS.  -Pulmonary causes include hypoxia or hypercapnia - although patient is saturating well on room air - >93%  -GI causes to consider include Liver Failure - Liver enzymes unremarkable  -renal function within normal limits  -patient does have a history of Hashimoto's, TSH within normal limits.  Check T3/T4  -consult Neurology  -aspiration precautions, fall precautions, seizure precautions  -neuro checks  -Continue correction of metabolic derangements  -Maintain Delirium precautions: Maintain regular sleep cycle. Early ambulation. Minimal interruptions  overnight. Re-orient patient frequently. Maintain adequate bowel regimen, hydration and electrolyte replenishments. Hearing Aids and eye glasses as needed.     Seropositive rheumatoid arthritis of multiple sites  Long-term use of immunosuppressant medication  -On home plaquenil, mycophenolate, and prednisone daily for RA  -follows with Dr. Rouse rheumatology  -hx of  2/2 RA     Essential hypertension  -stable  -Continue PTA  amlodipine, Coreg  -monitor vitals q4h  -SBP goal of <160 in hospital    Dystonia  -Follows with Dr. Giang of Neurology  -Hold Gabapentin and Baclofen  for now given AMS     History of stroke  -Chronic and stable  -Continue Aspirin 81mg PO daily  -Continue Plavix 75mg PO daily    GERD:  -continue PPI    Hyperlipidemia:  -continue PTA atorvastatin 40 mg      DVT PPx:  Heparin    Discharge plan and follow up: DC home once medically stable     Corie Singer MD  Hospital Medicine Staff  [phone] pager

## 2019-08-29 NOTE — ASSESSMENT & PLAN NOTE
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 Neuroimaging unremarkable for vascular events. EEG withshows generalized slowing c/w encephalopathy with no epileptiform discharge.  Over this admission patient with no electrolyte/metabolic abnormality or active infection. Although patient has been having high BP in the first few days at hospital ranging between 170-200 and also received increased dose of baclofen in this admission which contributed to more somnolence.  Patient's presentation is c/w encephalopathy which could be 2/2 high BP and worsened with high dose baclofen. Slight hyponatremia also could be contributing ti the AMS picture. Seizure based on history, and presentation is less likely. Would not start AED.    Recommendations:  - correct the metabolic derangements  - sleep/ wake cycle  - delirium precaution  - frequent orientation

## 2019-08-29 NOTE — TELEPHONE ENCOUNTER
She is currently admitted so will do this when she is discharged as overall regimen may change depending on hospital course and outcome    WD

## 2019-08-29 NOTE — PT/OT/SLP PROGRESS
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  She presents with limited session on this date due to limited alertness & active participation in activities due to lethargy.  Pt is currently at high fall risk & requires (A) with all activities. Performance deficits affecting function are weakness, impaired endurance, impaired self care skills, impaired functional mobilty, impaired balance, impaired cognition, decreased lower extremity function, decreased upper extremity function, decreased safety awareness.     Rehab Prognosis:  Fair; patient would benefit from acute skilled OT services to address these deficits and reach maximum level of function.       Plan:     Patient to be seen 4 x/week to address the above listed problems via self-care/home management, therapeutic activities, therapeutic exercises  · Plan of Care Expires: 09/27/19  · Plan of Care Reviewed with: patient, daughter, sibling    Subjective     Pain/Comfort:  · Pain Rating 1: 0/10  · Pain Rating Post-Intervention 1: 0/10(no indication of pain during session)    Objective:     Communicated with: RN prior to session.  Patient found supine with telemetry upon OT entry to room. Pt's sister & daughter present during session.    General Precautions: Standard, aspiration, fall     Occupational Performance:     Bed Mobility:    · Patient completed Rolling/Turning to Left with  maximal assistance  · Patient completed Scooting/Bridging with maximal assistance scooting forward on EOB; dependent drawsheet transfer up [facility] while supine  · Patient completed Supine to Sit with maximal assistance  · Patient  completed Sit to Supine with maximal assistance     Functional Mobility/Transfers:  · Patient completed Sit <> Stand Transfer with mod-maximal assistance of 2 persons with  rolling walker x 7 trials from EOB (most trials Max (A) of 2 persons)    Activities of Daily Living:  · Grooming: total assistance while seated EOB      AMPAC 6 Click ADL: 6    Treatment & Education:  Pt required Min-Max (A) with postural control while seated EOB due to fluctuating balance anteriorly & posteriorly.  Pt with limited verbalizations during session.  Pt had no further questions & when asked whether there were any concerns pt reported none.      Patient left supine with all lines intact, call button in reach, bed alarm on, RN notified, pt's sister present and white board updated.Education:      GOALS:   Multidisciplinary Problems     Occupational Therapy Goals        Problem: Occupational Therapy Goal    Goal Priority Disciplines Outcome Interventions   Occupational Therapy Goal     OT, PT/OT Ongoing (interventions implemented as appropriate)    Description:  Goals to be met by: 7 days (9/2/19)     Patient will increase functional independence with ADLs by performing:    UE Dressing with Supervision.  LE Dressing with Stand-by Assistance.  Grooming while standing at sink with Stand-by Assistance.  Toileting from toilet with Stand-by Assistance for hygiene and clothing management.   Supine to sit with Stand-by Assistance.  Toilet transfer to toilet with Stand-by Assistance.  Complete functional mobility household distance with SBA using AD as needed.                      Time Tracking:     OT Date of Treatment: 08/29/19  OT Start Time: 1418  OT Stop Time: 1439  OT Total Time (min): 21 min    Billable Minutes:Therapeutic Activity 21    AYAD Holguin  8/29/2019

## 2019-08-29 NOTE — ASSESSMENT & PLAN NOTE
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 Neuroimaging unremarkable for vascular events. EEG withshows generalized slowing c/w encephalopathy with no epileptiform discharge.  Over this admission patient with no electrolyte/metabolic abnormality or active infection. Although patient has been having high BP in the first few days at hospital ranging between 170-200 and also received increased dose of baclofen in this admission which contributed to more somnolence.  Patient's presentation is c/w encephalopathy which could be 2/2 high BP and worsened with high dose baclofen. Slight hyponatremia also could be contributing ti the AMS picture. Seizure based on history, and presentation is less likely. Would not start AED.    Recommendations:  - correct the metabolic derangements  - sleep/ wake cycle  - seizure precaution, hold AED for now   - delirium precaution  - frequent orientation   - Continue secondary CVA prevention: ASA+Plavix, atorvastatin 80mg daily, carvedilol/amlodipine

## 2019-08-29 NOTE — PLAN OF CARE
Problem: Occupational Therapy Goal  Goal: Occupational Therapy Goal  Goals to be met by: 7 days (9/2/19)     Patient will increase functional independence with ADLs by performing:    UE Dressing with Supervision.  LE Dressing with Stand-by Assistance.  Grooming while standing at sink with Stand-by Assistance.  Toileting from toilet with Stand-by Assistance for hygiene and clothing management.   Supine to sit with Stand-by Assistance.  Toilet transfer to toilet with Stand-by Assistance.  Complete functional mobility household distance with SBA using AD as needed.     Outcome: Ongoing (interventions implemented as appropriate)  Goals remain appropriate

## 2019-08-29 NOTE — PT/OT/SLP PROGRESS
Physical Therapy      Patient Name:  Selma Lux MD   MRN:  [id number]    Patient not seen today secondary to pt with Speech therapy upon attempt  . Will follow-up next scheduled treatment per PT POC.    Adrien Anderson, PTA  8/29/2019

## 2019-08-29 NOTE — PLAN OF CARE
Problem: SLP Goal  Goal: SLP Goal  Speech Language Pathology Goals  Goals expected to be met by 9/3  1. Pt will Ox3 given min A/external aids.   2. Pt will complete mod complex comprehension tasks with 80% accy.  3. Pt will complete further assessment of mod complex word finding to determine need for tx.    4. Pt will participate in ongoing swallow assessment        Outcome: Ongoing (interventions implemented as appropriate)  Pt seen for ST. Pt with minimal sustained attention and easily fell back to sleep upon attempts to wake. Daughter and Sister at bedside upon SLP attempts education on aspiration precautions and need for ongoing assessment when more awake/alert. REC: NPO and ST to continue to follow for ongoing swallow assessment as ELAINE improves. Should PO medications be required, safest means would be crushed in puree only if awake/alert, upright at 90 degree angle, attentive and provided strict aspiration precautions. Recommendations reviewed with LENORA Gomez., [facility]-SLP  Speech-Language Pathology  Pager: [phone]  8/29/2019

## 2019-08-29 NOTE — CONSULTS
[facility]  Neurology  Consult Note    Patient Name: Selma Alonzo Lux MD  MRN: [id number]  Admission Date: 8/26/2019  Hospital Length of Stay: 0 days  Code Status: Full Code   Attending Provider: Corie Singer MD   Consulting Provider: Grace Block MD  Primary Care Physician: Bhargav Hirsch MD  Principal Problem:Acute encephalopathy    Inpatient consult to Neurology  Consult performed by: Grace Block MD  Consult ordered by: Corie Singer MD         Subjective:     Chief Complaint:  AMS     HPI:    Selma Lux is a 82 yo AA female with PMHx of  cryptogenic organizing pneumonia secondary to seropositive RA, HTN, CVA who is here after an episode of altered mental status and dysarthria. Vascular neurology evaluated patient for possible stroke/TIA which their work up was unremarkable. MRI brain no evidence of acute infarctions. MRA brain/neck without evidence for significant focal stenosis or occlusion.   Family reported patient has episodes of staring off periodically at home. EEG done at this admission shows generalized slowing c/w encephalopathy with no epileptiform discharge.  Over this admission patient with no electrolyte/metabolic abnormality or active infection. Although patient has been having high BP in the first few days at hospital ranging between 170-200 and also received increased dose of baclofen in this admission which contributed to more somnolence.  Patient had a similar presentation in12/2018 which patient was admitted to ICU for hypertensive encephalopathy vs viral encephalopathy(based on CSF results). Patient had an EEG which was negative for epileptiform activity.        Past Medical History:   Diagnosis Date    Acute hypoxemic respiratory failure 4/11/2018    Altered mental status     Anemia     Arthritis     Bilateral hand pain 3/14/2018    Branch retinal vein occlusion, left eye 2/20/2015    Chronic bilateral low back pain without sciatica 3/23/2017     Chronic renal failure in pediatric patient, stage 3 (moderate) 4/15/2018    Cognitive communication deficit 12/19/2017    Cystoid macular edema, left eye 2/20/2015    Cystoid macular edema, left eye 2/20/2015    DJD (degenerative joint disease) of cervical spine 5/15/2013    Fatty liver 8/26/2014    Goiter 4/9/2018    Hashimoto's disease     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    Hypertension     Hypertensive encephalopathy     IBS (irritable bowel syndrome) 6/21/2017    IGT (impaired glucose tolerance) 1/12/2016    Iron deficiency anemia secondary to inadequate dietary iron intake 6/24/2013    Joint pain     Keratoconjunctivitis sicca of both eyes not specified as Sjogren's 7/29/2016    Leiomyoma of uterus, unspecified 9/16/2014    Long QT interval 6/29/2016    Due to medication (plaquenil)     Macular edema 1/12/2015    Multinodular goiter 1/12/2016    Neuropathy 8/23/2017    Plaquenil causing adverse effect in therapeutic use 10/7/2016    Pneumococcal vaccine refused 8/17/2016    Pneumonia due to Streptococcus pneumoniae 4/5/2018    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/2015    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    s/p Right Total knee replacement 5/15/2013    Scoliosis of thoracic spine 5/15/2013    Small vessel disease, cerebrovascular 12/28/2017    Stroke     UTI (urinary tract infection) 12/29/2018    Vascular dementia 12/6/2017       Past Surgical History:   Procedure Laterality Date    BREAST CYST EXCISION      CATARACT EXTRACTION      COLONOSCOPY N/A 9/29/2015    Performed by F[facility]ELINA Sanchez MD at [facility] ENDO (4TH FLR)    EGD (ESOPHAGOGASTRODUODENOSCOPY) N/A 3/11/2014    Performed by Federico Escobar MD at [facility] END[facility] (4TH FLR)    EGD (ESOPHAGOGASTRODUODENOSCOPY) N/A 11/5/2013    Performed by [facility] at [facility] END[facility] (4TH FLR)    ESOPHAGOGASTRODUODENOSCOPY (EGD) N/A 10/4/2017    Performed by Mg Morton MD at [facility] ENDO    EYE SURGERY       INJECTION-STEROID-EPIDURAL-TRANSFORAMINAL Right 9/20/2017    Performed by [facility] at [facility]    JOINT REPLACEMENT      right knee    KNEE SURGERY Left 12/31/2013    TKR    left parotidectomy      mixed tumor    SALIVARY GLAND SURGERY      TONSILLECTOMY         Review of patient's allergies indicates:  No Known Allergies    Current Neurological Medications: neurontin    No current facility-administered medications on file prior to encounter.      Current Outpatient Medications on File Prior to Encounter   Medication Sig    acetaminophen (TYLENOL) 500 MG tablet Take 1,000 mg by mouth daily as needed for Pain.    albuterol (PROVENTIL/VENTOLIN HFA) 90 mcg/actuation inhaler Inhale 2 puffs into the lungs every 6 (six) hours as needed for Wheezing. Rescue    aspirin (ECOTRIN) 81 MG EC tablet Take 81 mg by mouth once daily.    baclofen (LIORESAL) 10 MG tablet Take 1 tablet (10 mg total) by mouth 3 (three) times daily.    carvedilol (COREG) 12.5 MG tablet Take 1 tablet (12.5 mg total) by mouth 2 (two) times daily with meals.    clopidogrel (PLAVIX) 75 mg tablet Take 1 tablet (75 mg total) by mouth once daily.    diazePAM (VALIUM) 2 MG tablet Take 1 tablet (2 mg total) by mouth nightly as needed for Anxiety.    furosemide (LASIX) 20 MG tablet TAKE 1 TABLET BY MOUTH EVERY DAY AT 6 AM    furosemide (LASIX) 40 MG tablet     gabapentin (NEURONTIN) 300 MG capsule Take 1 capsule (300 mg total) by mouth every evening.    hydroxychloroquine (PLAQUENIL) 200 mg tablet Take 1 tablet (200 mg total) by mouth 2 (two) times daily.    linaclotide (LINZESS) 145 mcg Cap capsule Take 1 capsule (145 mcg total) by mouth once daily.    magnesium oxide (MAG-OX) 400 mg (241.3 mg magnesium) tablet Take 1 tablet (400 mg total) by mouth once daily.    montelukast (SINGULAIR) 10 mg tablet Take 1 tablet (10 mg total) by mouth every evening.    mycophenolate (CELLCEPT) 500 mg Tab Take 1 tablet (500 mg total) by  mouth 2 (two) times daily.    omeprazole (PRILOSEC) 20 MG capsule Take 1 capsule (20 mg total) by mouth once daily.    potassium chloride SA (K-DUR,KLOR-CON) 10 MEQ tablet Take 2 tablets (20 mEq total) by mouth once daily.    predniSONE (DELTASONE) 1 MG tablet Take 1 tablet (1 mg total) by mouth once daily.    predniSONE (DELTASONE) 10 MG tablet Take 20 mg by mouth once daily.    sulfaSALAzine (AZULFIDINE) 500 MG TbEC Take 2 tablets (1,000 mg total) by mouth 2 (two) times daily.    traMADol (ULTRAM) 50 mg tablet Take 1-2 tablets ( mg total) by mouth every 24 hours as needed for Pain.     Family History     Problem Relation (Age of Onset)    Breast cancer Maternal Grandmother    Cancer Father    Heart disease Mother    Hypertension Mother    Prostate cancer Father        Tobacco Use    Smoking status: Never Smoker    Smokeless tobacco: Never Used   Substance and Sexual Activity    Alcohol use: No     Alcohol/week: 0.0 oz     Frequency: Monthly or less     Drinks per session: 1 or 2     Binge frequency: Never     Comment: very seldom     Drug use: No    Sexual activity: Never     Comment: ,  age 52,      Review of Systems   Unable to perform ROS: Mental status change   Constitutional: Positive for activity change. Negative for chills, fatigue and fever.   HENT: Negative for voice change.    Eyes: Negative for visual disturbance.   Respiratory: Negative for shortness of breath.    Cardiovascular: Negative for chest pain, palpitations and leg swelling.   Gastrointestinal: Negative for abdominal pain.   Musculoskeletal: Negative for neck pain and neck stiffness.   Skin: Negative for rash.   Neurological: Positive for speech difficulty. Negative for dizziness, tremors, seizures, facial asymmetry, light-headedness and numbness.     Objective:     Vital Signs (Most Recent):  Temp: 97.5 °F (36.4 °C) (08/29/19 1540)  Pulse: 92 (08/29/19 1600)  Resp: 18 (08/29/19 1540)  BP: 125/60 (08/29/19  1540)  SpO2: (!) 94 % (08/29/19 1540) Vital Signs (24h Range):  Temp:  [97.5 °F (36.4 °C)-100.1 °F (37.8 °C)] 97.5 °F (36.4 °C)  Pulse:  [] 92  Resp:  [17-19] 18  SpO2:  [93 %-96 %] 94 %  BP: (107-165)/(58-91) 125/60     Weight: 80.3 kg (177 lb)  Body mass index is 29.45 kg/m².    Physical Exam   Constitutional: She appears well-developed and well-nourished. No distress.   HENT:   Head: Normocephalic and atraumatic.   Eyes: Pupils are equal, round, and reactive to light. Conjunctivae and EOM are normal.   Neck: Normal range of motion.   Cardiovascular: Normal rate.   Pulmonary/Chest: Effort normal.   Abdominal: Soft.   Musculoskeletal: Normal range of motion.   Neurological:   Reflex Scores:       Patellar reflexes are 1+ on the right side and 1+ on the left side.  Skin: She is not diaphoretic.   Psychiatric: Her speech is slurred.       NEUROLOGICAL EXAMINATION:     MENTAL STATUS   Attention: decreased.   Speech: slurred   Level of consciousness: arousable by tactile stimuli    CRANIAL NERVES     CN III, IV, VI   Pupils are equal, round, and reactive to light.  Extraocular motions are normal.   Right pupil: Size: 2 mm.   Left pupil: Size: 2 mm.   CN III: no CN III palsy  CN VI: no CN VI palsy  Nystagmus: none     CN VIII   Hearing: intact    MOTOR EXAM   Muscle bulk: normal  Overall muscle tone: normal    REFLEXES     Reflexes   Right patellar: 1+  Left patellar: 1+  Right plantar: normal  Left plantar: normal      Significant Labs:   CMP:   Recent Labs   Lab 08/28/19  0608 08/29/19  0441   GLU 89 51*   * 134*   K 4.0 4.5   CL 98 100   CO2 25 18*   BUN 14 21   CREATININE 1.0 1.1   CALCIUM 9.3 9.2   MG 1.8  --    PROT 6.2 6.4   ALBUMIN 3.7 3.7   BILITOT 0.7 0.9   ALKPHOS 45* 48*   AST 11 16   ALT 7* 10   ANIONGAP 11 16   EGFRNONAA 53.0* 47.2*     Urine Studies: No results for input(s): COLORU, APPEARANCEUA, PHUR, SPECGRAV, PROTEINUA, GLUCUA, KETONESU, BILIRUBINUA, OCCULTUA, NITRITE, UROBILINOGEN,  LEUKOCYTESUR, RBCUA, WBCUA, BACTERIA, SQUAMEPITHEL, HYALINECASTS in the last 48 hours.    Invalid input(s): LATESHA    Significant Imaging: I have reviewed and interpreted all pertinent imaging results/findings within the past 24 hours.    Assessment and Plan:     Altered mental status  82 yo female with vascular dementoia nd history of encephalopathy secondary to HTN presented with acute onset AMS and dysarthria. Neuroimaging unremarkable for vascular events. EEG withshows generalized slowing c/w encephalopathy with no epileptiform discharge.  Over this admission patient with no electrolyte/metabolic abnormality or active infection. Although patient has been having high BP in the first few days at hospital ranging between 170-200 and also received increased dose of baclofen in this admission which contributed to more somnolence.  Patient's presentation is c/w encephalopathy which could be 2/2 high BP and worsened with high dose baclofen. Slight hyponatremia also could be contributing ti the AMS picture. Seizure based on history, and presentation is less likely. Would not start AED.    Recommendations:  - correct the metabolic derangements  - sleep/ wake cycle  - seizure precaution, hold AED for now   - delirium precaution  - frequent orientation   - Continue secondary CVA prevention: ASA+Plavix, atorvastatin 80mg daily, carvedilol/amlodipine          VTE Risk Mitigation (From admission, onward)        Ordered     heparin (porcine) injection 5,000 Units  Every 8 hours      08/27/19 0420     IP VTE HIGH RISK PATIENT  Once      08/27/19 0420     Place sequential compression device  Until discontinued      08/27/19 0420          Thank you for your consult. I will follow-up with patient. Please contact us if you have any additional questions.    Grace Block MD  Neurology  [facility]

## 2019-08-29 NOTE — SUBJECTIVE & OBJECTIVE
Past Medical History:   Diagnosis Date    Acute hypoxemic respiratory failure 4/11/2018    Altered mental status     Anemia     Arthritis     Bilateral hand pain 3/14/2018    Branch retinal vein occlusion, left eye 2/20/2015    Chronic bilateral low back pain without sciatica 3/23/2017    Chronic renal failure in pediatric patient, stage 3 (moderate) 4/15/2018    Cognitive communication deficit 12/19/2017    Cystoid macular edema, left eye 2/20/2015    Cystoid macular edema, left eye 2/20/2015    DJD (degenerative joint disease) of cervical spine 5/15/2013    Fatty liver 8/26/2014    Goiter 4/9/2018    Hashimoto's disease     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    Hypertension     Hypertensive encephalopathy     IBS (irritable bowel syndrome) 6/21/2017    IGT (impaired glucose tolerance) 1/12/2016    Iron deficiency anemia secondary to inadequate dietary iron intake 6/24/2013    Joint pain     Keratoconjunctivitis sicca of both eyes not specified as Sjogren's 7/29/2016    Leiomyoma of uterus, unspecified 9/16/2014    Long QT interval 6/29/2016    Due to medication (plaquenil)     Macular edema 1/12/2015    Multinodular goiter 1/12/2016    Neuropathy 8/23/2017    Plaquenil causing adverse effect in therapeutic use 10/7/2016    Pneumococcal vaccine refused 8/17/2016    Pneumonia due to Streptococcus pneumoniae 4/5/2018    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/2015    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    s/p Right Total knee replacement 5/15/2013    Scoliosis of thoracic spine 5/15/2013    Small vessel disease, cerebrovascular 12/28/2017    Stroke     UTI (urinary tract infection) 12/29/2018    Vascular dementia 12/6/2017       Past Surgical History:   Procedure Laterality Date    BREAST CYST EXCISION      CATARACT EXTRACTION      COLONOSCOPY N/A 9/29/2015    Performed by FIDELINA Sanchez MD at [facility] (4TH FLR)    EGD (ESOPHAGOGASTRODUODENOSCOPY) N/A 3/11/2014     Performed by [facility]obar [facility] at [facility] ENDO (4TH FLR)    EGD (ESOPHAGOGASTRODUODENOSCOPY) N/A 11/5/2013    Performed by Federico [facility]obar [facility] at [facility] ENDO (4TH FLR)    ESOPHAGOGASTRODUODENOSCOPY (EGD) N/A 10/4/2017    Performed by Mg [facility] at [facility] ENDO    EYE SURGERY      INJECTION-STEROID-EPIDURAL-TRANSFORAMINAL Right 9/20/2017    [facility] PAIN MGT    JOINT REPLACEMENT      right knee    KNEE SURGERY Left 12/31/2013    TKR    left parotidectomy      mixed tumor    SALIVARY GLAND SURGERY      TONSILLECTOMY         Review of patient's allergies indicates:  No Known Allergies    Current Neurological Medications: neurontin    No current facility-administered medications on file prior to encounter.      Current Outpatient Medications on File Prior to Encounter   Medication Sig    acetaminophen (TYLENOL) 500 MG tablet Take 1,000 mg by mouth daily as needed for Pain.    albuterol (PROVENTIL/VENTOLIN HFA) 90 mcg/actuation inhaler Inhale 2 puffs into the lungs every 6 (six) hours as needed for Wheezing. Rescue    aspirin (ECOTRIN) 81 MG EC tablet Take 81 mg by mouth once daily.    baclofen (LIORESAL) 10 MG tablet Take 1 tablet (10 mg total) by mouth 3 (three) times daily.    carvedilol (COREG) 12.5 MG tablet Take 1 tablet (12.5 mg total) by mouth 2 (two) times daily with meals.    clopidogrel (PLAVIX) 75 mg tablet Take 1 tablet (75 mg total) by mouth once daily.    diazePAM (VALIUM) 2 MG tablet Take 1 tablet (2 mg total) by mouth nightly as needed for Anxiety.    furosemide (LASIX) 20 MG tablet TAKE 1 TABLET BY MOUTH EVERY DAY AT 6 AM    furosemide (LASIX) 40 MG tablet     gabapentin (NEURONTIN) 300 MG capsule Take 1 capsule (300 mg total) by mouth every evening.    hydroxychloroquine (PLAQUENIL) 200 mg tablet Take 1 tablet (200 mg total) by mouth 2 (two) times daily.    linaclotide (LINZESS) 145 mcg Cap capsule Take 1 capsule (145 mcg total) by mouth once daily.     magnesium oxide (MAG-OX) 400 mg (241.3 mg magnesium) tablet Take 1 tablet (400 mg total) by mouth once daily.    montelukast (SINGULAIR) 10 mg tablet Take 1 tablet (10 mg total) by mouth every evening.    mycophenolate (CELLCEPT) 500 mg Tab Take 1 tablet (500 mg total) by mouth 2 (two) times daily.    omeprazole (PRILOSEC) 20 MG capsule Take 1 capsule (20 mg total) by mouth once daily.    potassium chloride SA (K-DUR,KLOR-CON) 10 MEQ tablet Take 2 tablets (20 mEq total) by mouth once daily.    predniSONE (DELTASONE) 1 MG tablet Take 1 tablet (1 mg total) by mouth once daily.    predniSONE (DELTASONE) 10 MG tablet Take 20 mg by mouth once daily.    sulfaSALAzine (AZULFIDINE) 500 MG TbEC Take 2 tablets (1,000 mg total) by mouth 2 (two) times daily.    traMADol (ULTRAM) 50 mg tablet Take 1-2 tablets ( mg total) by mouth every 24 hours as needed for Pain.     Family History     Problem Relation (Age of Onset)    Breast cancer Maternal Grandmother    Cancer Father    Heart disease Mother    Hypertension Mother    Prostate cancer Father        Tobacco Use    Smoking status: Never Smoker    Smokeless tobacco: Never Used   Substance and Sexual Activity    Alcohol use: No     Alcohol/week: 0.0 oz     Frequency: Monthly or less     Drinks per session: 1 or 2     Binge frequency: Never     Comment: very seldom     Drug use: No    Sexual activity: Never     Comment: ,  age 52,      Review of Systems   Unable to perform ROS: Mental status change   Constitutional: Positive for activity change. Negative for chills, fatigue and fever.   HENT: Negative for voice change.    Eyes: Negative for visual disturbance.   Respiratory: Negative for shortness of breath.    Cardiovascular: Negative for chest pain, palpitations and leg swelling.   Gastrointestinal: Negative for abdominal pain.   Musculoskeletal: Negative for neck pain and neck stiffness.   Skin: Negative for rash.   Neurological: Positive for speech  difficulty. Negative for dizziness, tremors, seizures, facial asymmetry, light-headedness and numbness.     Objective:     Vital Signs (Most Recent):  Temp: 97.5 °F (36.4 °C) (08/29/19 1540)  Pulse: 92 (08/29/19 1600)  Resp: 18 (08/29/19 1540)  BP: 125/60 (08/29/19 1540)  SpO2: (!) 94 % (08/29/19 1540) Vital Signs (24h Range):  Temp:  [97.5 °F (36.4 °C)-100.1 °F (37.8 °C)] 97.5 °F (36.4 °C)  Pulse:  [] 92  Resp:  [17-19] 18  SpO2:  [93 %-96 %] 94 %  BP: (107-165)/(58-91) 125/60     Weight: 80.3 kg (177 lb)  Body mass index is 29.45 kg/m².    Physical Exam   Constitutional: She appears well-developed and well-nourished. No distress.   HENT:   Head: Normocephalic and atraumatic.   Eyes: Pupils are equal, round, and reactive to light. Conjunctivae and EOM are normal.   Neck: Normal range of motion.   Cardiovascular: Normal rate.   Pulmonary/Chest: Effort normal.   Abdominal: Soft.   Musculoskeletal: Normal range of motion.   Neurological:   Reflex Scores:       Patellar reflexes are 1+ on the right side and 1+ on the left side.  Skin: She is not diaphoretic.   Psychiatric: Her speech is slurred.       NEUROLOGICAL EXAMINATION:     MENTAL STATUS   Attention: decreased.   Speech: slurred   Level of consciousness: arousable by tactile stimuli    CRANIAL NERVES     CN III, IV, VI   Pupils are equal, round, and reactive to light.  Extraocular motions are normal.   Right pupil: Size: 2 mm.   Left pupil: Size: 2 mm.   CN III: no CN III palsy  CN VI: no CN VI palsy  Nystagmus: none     CN VIII   Hearing: intact    MOTOR EXAM   Muscle bulk: normal  Overall muscle tone: normal    REFLEXES     Reflexes   Right patellar: 1+  Left patellar: 1+  Right plantar: normal  Left plantar: normal      Significant Labs:   CMP:   Recent Labs   Lab 08/28/19  0608 08/29/19  0441   GLU 89 51*   * 134*   K 4.0 4.5   CL 98 100   CO2 25 18*   BUN 14 21   CREATININE 1.0 1.1   CALCIUM 9.3 9.2   MG 1.8  --    PROT 6.2 6.4   ALBUMIN 3.7 3.7    BILITOT 0.7 0.9   ALKPHOS 45* 48*   AST 11 16   ALT 7* 10   ANIONGAP 11 16   EGFRNONAA 53.0* 47.2*     Urine Studies: No results for input(s): COLORU, APPEARANCEUA, PHUR, SPECGRAV, PROTEINUA, GLUCUA, KETONESU, BILIRUBINUA, OCCULTUA, NITRITE, UROBILINOGEN, LEUKOCYTESUR, RBCUA, WBCUA, BACTERIA, SQUAMEPITHEL, HYALINECASTS in the last 48 hours.    Invalid input(s): LATESHA    Significant Imaging: I have reviewed and interpreted all pertinent imaging results/findings within the past 24 hours.

## 2019-08-29 NOTE — NURSING
Pt is very lethargic this morning. Having difficulty staying awake to eat breakfast. Daughter assisting pt. Pt was able to take meds crushed this morning. Temp was also elevated and Tylenol was admin.  Currently asleep again and snoring. Will continue to monitor.

## 2019-08-29 NOTE — PLAN OF CARE
Problem: Adult Inpatient Plan of Care  Goal: Plan of Care Review  Outcome: Outcome(s) achieved Date Met: 08/29/19  Safety measures maintained this shift. VSS on RA. Meds had be crushed and mixed with pudding. Has spent most of shift sleeping. Slightly more arousable this afternoon. Eating about 25% of meals. Bed in lowest locked position and call light within reach. NAD noted. Will continue to monitor.

## 2019-08-29 NOTE — HPI
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 Vascular neurology evaluated patient for possible stroke/TIA which their work up was unremarkable. MRI brain no evidence of acute infarctions. MRA brain/neck without evidence for significant focal stenosis or occlusion.   Family reported patient has episodes of staring off periodically at home. EEG done at this admission shows generalized slowing c/w encephalopathy with no epileptiform discharge.  Over this admission patient with no electrolyte/metabolic abnormality or active infection. Although patient has been having high BP in the first few days at hospital ranging between 170-200 and also received increased dose of baclofen in this admission which contributed to more somnolence.  Patient had a similar presentation in12/2018 which patient was admitted to ICU for hypertensive encephalopathy vs viral encephalopathy(based on CSF results). Patient had an EEG which was negative for epileptiform activity.

## 2019-08-29 NOTE — PT/OT/SLP PROGRESS
"Speech Language Pathology Treatment    Patient Name:  Selma Lux MD   MRN:  [id number]  Admitting Diagnosis: Acute encephalopathy    Recommendations:                 General Recommendations:  Dysphagia therapy and Cognitive-linguistic therapy  Diet recommendations:  NPO, Should PO medications be required safest means would be crushed in puree provided Pt is awake/alert, upright at 90 degree angle, attentive to bites/sips and provided strict aspiration precautions   Aspiration Precautions: Frequent oral care and Strict aspiration precautions   General Precautions: Standard, aspiration, fall  Communication strategies:  provide increased time to answer and go to room if call light pushed    Subjective     SLP reviewed Pt with RN, RN reported PT with waxing/waning level of alertness, accepted medications crushed in puree  Pt presents somnolent across SLP attempts  She says "Darlene"  Her Daughter explains, "She was able to swallow a few bites of her breakfast"  Sister later explained, "She's been sleeping soundly"    Pain/Comfort:  · Pain Rating 1: (KERRY 2/2 cognitive status)    Objective:     Has the patient been evaluated by SLP for swallowing?   Yes  Keep patient NPO? No   Current Respiratory Status: room air      Pt seen for ongoing cognitive therapy. Upon SLP entrance to room, Pt found asleep in bed with partially completed breakfast meal tray at bedside. Daughter at bedside. Daughter explained Pt woke and accepted bites of breakfast tray for regular diet earlier service day, then easily fell back to sleep following meal . Patient lethargic and slow to rouse provided maximum tactile and verbal cues. Pt would wake then easily return to sleep state despite consistent, verbal and tactile cues from daughter at bedside and SLP. SLP unable to assess vocal quality, cough response or ability to follow simple commands 2/2 decreased sustained attention. PO trials held 2/2 AMS. SLP reviewed aspiration precautions, " S/S aspiration, SLP role and need for ongoing swallow assessment when more awake/alert and SLP plans to return later service day when more appropriate for PO intake. Daughter v/u. Findsandovalsn reviewed with RN. Pt remained asleep in bed with daughter at bedside upon SLP exit from room.  Upon second attempt later service day, Pt reviewed with RN. Upon review of Patient with RN, RN reported Pt with waxing/waning alertness. Pt found asleep in bed with partially completed lunch meal tray at bedside.  Sister in room with Patient t/o session. SLP attempted to wake Patient; however, Patient remained in deep sleep state across attempts to wake Patient.  SLP reviewed aspiration precautions with Sister at bedside, need for ongoing swallow assessment as ELAINE improves,  and ongoing SLP POC. She v/u of SLP recommendations. Whiteboard updated. Findings reviewed with Dr. Singer via secure chat following session. No additional questions noted. Pt remained in bed with call light in reach, and her Sister at bedside, upon SLP exit from room.     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  She presents with  risk of aspiration 2/2 waxing/waning level of attention.  ST to continue to follow for ongoing swallow assessment and cognitive-linguistic tx as ELAINE improves. Findings reviewed with MD.     Goals:   Multidisciplinary Problems     SLP Goals        Problem: SLP Goal    Goal Priority Disciplines Outcome   SLP Goal     SLP Ongoing (interventions implemented as appropriate)   Description:  Speech Language Pathology Goals  Goals expected to be met by 9/3  1. Pt will Ox3 given min A/external aids.   2. Pt will complete mod complex comprehension tasks with 80% accy.  3. Pt will complete further assessment of mod complex word finding to determine need for tx.                          Plan:     · Patient to be seen:  4 x/week   · Plan of Care expires:   09/28/19  · Plan of Care reviewed with:  patient, daughter   · SLP Follow-Up:  Yes       Discharge recommendations:  home health speech therapy     Time Tracking:     SLP Treatment Date:   08/29/19  Speech Start Time:  1038/15:33  Speech Stop Time:  1053   /15:42  Speech Total Time (min):  15 min/8 min    Billable Minutes: Treatment Swallowing Dysfunction 8 and Seld Care/Home Management Training 15    LENORA Ray., [facility]SLP  Speech-Language Pathology  Pager: [phone]    08/29/2019

## 2019-08-29 NOTE — TELEPHONE ENCOUNTER
"----- Message from Candi Mack PharmD sent at 8/23/2019 12:40 PM CDT -----  Regarding: [employer]  Good afternoon Dr. Rouse and staff,    Just a follow up message regarding previously sent message 7/30/19 regarding Ms. Lux's mycophenolate. FYI:  Mycophenolate does not require prior authorization. Patient's insurance requires the patient to fill through [facility]. Please send prescription to [facility], which has been added to the patients EPIC profile. Patient has been notified and provided with the necessary info to call and schedule a delivery.     #Please note: [facility] prefers 90 day supply prescriptions. Patient's co-pay will be $7.00 if filled for 30 or 90 day supply.#     To complete this in EPIC, the original order MUST be discontinued and re-typed as a new prescription with the updated pharmacy listed. Clicking "reorder" will continue to route the rx to [facility] even if the pharmacy is changed. Please [facility] the [facility] out [facility] when the BPA is fired.     Thank you,  Candi Mack, PharmD  [facility]  [phone]   "

## 2019-08-30 PROBLEM — R06.00 DYSPNEA: Status: ACTIVE | Noted: 2019-08-30

## 2019-08-30 PROBLEM — T88.7XXA MEDICATION SIDE EFFECT: Status: ACTIVE | Noted: 2018-12-16

## 2019-08-30 PROCEDURE — 25000003 PHARM REV CODE 250: Performed by: NURSE PRACTITIONER

## 2019-08-30 PROCEDURE — 99225 PR SUBSEQUENT OBSERVATION CARE,LEVEL II: CPT | Mod: ,,, | Performed by: HOSPITALIST

## 2019-08-30 PROCEDURE — 97116 GAIT TRAINING THERAPY: CPT

## 2019-08-30 PROCEDURE — 63600175 PHARM REV CODE 636 W HCPCS: Performed by: HOSPITALIST

## 2019-08-30 PROCEDURE — 92526 ORAL FUNCTION THERAPY: CPT

## 2019-08-30 PROCEDURE — 63600175 PHARM REV CODE 636 W HCPCS: Performed by: NURSE PRACTITIONER

## 2019-08-30 PROCEDURE — 99215 OFFICE O/P EST HI 40 MIN: CPT | Mod: GC,,, | Performed by: PSYCHIATRY & NEUROLOGY

## 2019-08-30 PROCEDURE — 99215 PR OFFICE/OUTPT VISIT, EST, LEVL V, 40-54 MIN: ICD-10-PCS | Mod: GC,,, | Performed by: PSYCHIATRY & NEUROLOGY

## 2019-08-30 PROCEDURE — 11000001 HC ACUTE MED/SURG PRIVATE ROOM

## 2019-08-30 PROCEDURE — 99225 PR SUBSEQUENT OBSERVATION CARE,LEVEL II: ICD-10-PCS | Mod: ,,, | Performed by: HOSPITALIST

## 2019-08-30 PROCEDURE — 97530 THERAPEUTIC ACTIVITIES: CPT

## 2019-08-30 RX ORDER — PREDNISONE 5 MG/1
25 TABLET ORAL DAILY
Qty: 50 TABLET | Refills: 0 | Status: SHIPPED | OUTPATIENT
Start: 2019-08-31 | End: 2019-09-10

## 2019-08-30 RX ORDER — ATORVASTATIN CALCIUM 40 MG/1
40 TABLET, FILM COATED ORAL DAILY
Qty: 90 TABLET | Refills: 3 | Status: SHIPPED | OUTPATIENT
Start: 2019-08-31 | End: 2019-10-17 | Stop reason: SDUPTHER

## 2019-08-30 RX ADMIN — ATORVASTATIN CALCIUM 40 MG: 10 TABLET, FILM COATED ORAL at 10:08

## 2019-08-30 RX ADMIN — CARVEDILOL 12.5 MG: 12.5 TABLET, FILM COATED ORAL at 10:08

## 2019-08-30 RX ADMIN — HEPARIN SODIUM 5000 UNITS: 5000 INJECTION INTRAVENOUS; SUBCUTANEOUS at 09:08

## 2019-08-30 RX ADMIN — ASPIRIN 81 MG: 81 TABLET, COATED ORAL at 10:08

## 2019-08-30 RX ADMIN — AMLODIPINE BESYLATE 5 MG: 5 TABLET ORAL at 10:08

## 2019-08-30 RX ADMIN — HEPARIN SODIUM 5000 UNITS: 5000 INJECTION INTRAVENOUS; SUBCUTANEOUS at 03:08

## 2019-08-30 RX ADMIN — MYCOPHENOLATE MOFETIL 500 MG: 250 CAPSULE ORAL at 10:08

## 2019-08-30 RX ADMIN — PANTOPRAZOLE SODIUM 40 MG: 40 TABLET, DELAYED RELEASE ORAL at 10:08

## 2019-08-30 RX ADMIN — HYDROXYCHLOROQUINE SULFATE 200 MG: 200 TABLET, FILM COATED ORAL at 09:08

## 2019-08-30 RX ADMIN — HEPARIN SODIUM 5000 UNITS: 5000 INJECTION INTRAVENOUS; SUBCUTANEOUS at 06:08

## 2019-08-30 RX ADMIN — PREDNISONE 25 MG: 5 TABLET ORAL at 10:08

## 2019-08-30 RX ADMIN — CLOPIDOGREL BISULFATE 75 MG: 75 TABLET, FILM COATED ORAL at 10:08

## 2019-08-30 RX ADMIN — MYCOPHENOLATE MOFETIL 500 MG: 250 CAPSULE ORAL at 09:08

## 2019-08-30 RX ADMIN — HYDROXYCHLOROQUINE SULFATE 200 MG: 200 TABLET, FILM COATED ORAL at 10:08

## 2019-08-30 RX ADMIN — MONTELUKAST 10 MG: 10 TABLET, FILM COATED ORAL at 09:08

## 2019-08-30 NOTE — PLAN OF CARE
Problem: SLP Goal  Goal: SLP Goal  Speech Language Pathology Goals  Goals expected to be met by 9/3  1. Pt will Ox3 given min A/external aids.   2. Pt will complete mod complex comprehension tasks with 80% accy.  3. Pt will complete further assessment of mod complex word finding to determine need for tx.    4. Pt will participate in ongoing swallow assessment         Outcome: Ongoing (interventions implemented as appropriate)  Pt. Progressing towards goals.  Regular diet with thin liquids recommended.    Do Simmons MA/[facility]-SLP  Speech Language Pathologist  Pager [phone]  8/30/2019

## 2019-08-30 NOTE — PLAN OF CARE
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  Work up for infection has been negative and the clinical picture is less consistent with acute infection.  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    · Slowly wean prednisone as tolerated.  · Now on sulfasalazine as per Dr. Rouse (Rheumatology).  It is probably reasonable to avoid biologics unless absolutely necessary given prior flare after infliximab.  · Repeat CXR from 12/25/2018 shows continued clearing.  · Consider repeat chest CT (non-contrast) once prednisone is at stable lowest dose possible.      Altered mental status [R41.82]  Yes    Medication side effect [T88.7XXA]  Unknown     · Hydroxychloroquine and Sulfasalazine      Chronic renal failure syndrome, stage 3 (moderate) [N18.3]  Yes    Essential hypertension [I10]  Yes      Resolved Hospital Problems   No resolved problems to display.       Patient is homebound due to:  Acute encephalopathy    Allergies:Review of patient's allergies indicates:  No Known Allergies    Vitals:     Every shift (Skilled Nursing patients)    Diet:   Supplement:  1 can every three times a day with meals                         Type:    Glucerna         Acitivities:  - Up in a chair each morning as tolerated   - Ambulate with  assistance to bathroom   - Scheduled walks once each shift (every 8 hours)      LABS:  Per facility protocol    Nursing Precautions:    - Aspiration precautions:             - Total assistance with meals            -  Upright 90 degrees befor during and after meals             -  Suction at bedside          - Fall precautions per nursing home protocol   - Seizure precaution per senior care protocol   - Decubitus precautions:        -  for positioning   - Pressure reducing foam mattress   - Turn patient every two hours. Use wedge pillows to anchor patient    CONSULTS:     Physical Therapy to evaluate and treat     Occupational Therapy to evaluate and treat     Speech Therapy  to evaluate and treat     Nutrition to evaluate and recommend diet     Psychiatry to evaluate and follow patients for delirium    MISCELLANEOUS CARE:    Routine Skin for Bedridden Patients:  Apply moisture barrier cream to all    skin folds and wet areas in perineal area daily and after baths and                           all bowel movements.        Medications: Discontinue all previous medication orders, if any. See new list below.     Selma Lux MD   Home Medication Instructions JAZZMINE:[id number]    Printed on:08/30/19 4596   Medication Information                      acetaminophen (TYLENOL) 500 MG tablet  Take 1,000 mg by mouth daily as needed for Pain.             albuterol (PROVENTIL/VENTOLIN HFA) 90 mcg/actuation inhaler  Inhale 2 puffs into the lungs every 6 (six) hours as needed for Wheezing. Rescue             amLODIPine (NORVASC) 5 MG tablet  Take 1 tablet (5 mg total) by mouth once daily.             aspirin (ECOTRIN) 81 MG EC tablet  Take 81 mg by mouth once daily.             atorvastatin (LIPITOR) 40 MG tablet  Take 1 tablet (40 mg total) by mouth once daily.             carvedilol (COREG) 12.5 MG tablet  Take 1 tablet (12.5 mg total) by mouth 2 (two) times daily with meals.             clopidogrel (PLAVIX) 75 mg  tablet  Take 1 tablet (75 mg total) by mouth once daily.             furosemide (LASIX) 20 MG tablet  TAKE 1 TABLET BY MOUTH EVERY DAY AT 6 AM             hydroxychloroquine (PLAQUENIL) 200 mg tablet  Take 1 tablet (200 mg total) by mouth 2 (two) times daily.             linaclotide (LINZESS) 145 mcg Cap capsule  Take 1 capsule (145 mcg total) by mouth once daily.             magnesium oxide (MAG-OX) 400 mg (241.3 mg magnesium) tablet  Take 1 tablet (400 mg total) by mouth once daily.             montelukast (SINGULAIR) 10 mg tablet  Take 1 tablet (10 mg total) by mouth every evening.             mycophenolate (CELLCEPT) 500 mg Tab  Take 1 tablet (500 mg total) by mouth 2 (two) times daily.             omeprazole (PRILOSEC) 20 MG capsule  Take 1 capsule (20 mg total) by mouth once daily.             potassium chloride SA (K-DUR,KLOR-CON) 10 MEQ tablet  Take 2 tablets (20 mEq total) by mouth once daily.             predniSONE (DELTASONE) 5 MG tablet  Take 5 tablets (25 mg total) by mouth once daily. for 10 days             sulfaSALAzine (AZULFIDINE) 500 MG TbEC  Take 2 tablets (1,000 mg total) by mouth 2 (two) times daily.                       _________________________________  Corie Singer MD  08/30/2019

## 2019-08-30 NOTE — PROGRESS NOTES
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  She noted that she had a doctor's appointment earlier today and appeared normal, went home, ate a normal dinner, but then approximately 2 hr later at about 9:00 p.m. the patient shouted for help and her daughter found her in the hallway hunched over her walker.  She states that she appeared very weak, and when she sat her down on the toilet to rest the patient was unable to finish a sentence because of confusion, and then began to have slurred speech.  This whole episode lasted approximately 30 min, and while debating coming to the ED a resolved.  The daughter notes a previous episode similar to this or in December 2018, she was admitted with encephalopathy, there is concern about hypertensive emergency versus viral meningitis as a cause.  Patient has a long complicated medical history. She sees Dr Giang for cervical dystonia and Botox injections and Dr Tobias for memory loss. Upon examination but still slightly confused but moving all extremities.  Patient was initially admitted to the vascular Neurology service to rule out TIA.  MRI/MRA were negative for acute ischemia.  Patient underwent EEG which did not show epileptiform abnormalities.  However according to vascular Neurology, due to patient's prior episode with similar symptoms in December 2018, there is a concern for possible seizures.  Patient also has superimposed toxic encephalopathy from baclofen and gabapentin overuse.  Neurology consulted    Interval hx:   Pt was seen and examined at bedside.  Patient remained afebrile and  hemodynamically stable overnight.  This morning patient was a lot more awake, conversant and able to answer questions appropriately.  She was seen working with physical therapy and walking to the bathroom.  Encephalopathy significantly improved.    ROS (Positive in Bold, otherwise negative)  Unable to obtain due to encephalopathy    PEx   Temp:  [97.5 °F (36.4 °C)-98.3 °F (36.8 °C)]   Pulse:  [77-92]   Resp:  [16-18]   BP: (110-141)/(55-73)   SpO2:  [94 %-100 %]      I & O (Last 24H):   No intake or output data in the 24 hours ending 08/30/19 1506    General:  female  in no acute distress. Sitting up in bed.  Cooperative.  HEENT: NCAT. PERRL. EOMI. Sclera Anicteric.  CVS: RRR. Normal S1 S2. No murmurs  Pulm: CTAB. Normal respiratory effort. No wheezes, rhonchi, or crackles.  Abdomen: Soft. Non-distended. No tenderness to palpation. No rebound or guarding. +BS.  Extremities: No edema. No cyanosis. Full ROM.  Neuro: Alert, oriented x 4, Spont mvt of all extremities with no focal deficits noted.    Recent Results (from the past 24 hour(s))   Blood culture    Collection Time: 08/29/19  4:07 PM   Result Value Ref Range    Blood Culture, Routine No Growth to date    Blood culture    Collection Time: 08/29/19  4:07 PM   Result Value Ref Range    Blood Culture, Routine No Growth to date    T3, free    Collection Time: 08/29/19  4:07 PM   Result Value Ref Range    T3, Free 2.3 2.3 - 4.2 pg/mL   T4    Collection Time: 08/29/19  4:07 PM   Result Value Ref Range    T4, Total 5.8 4.5 - 11.5 ug/dL       No results for input(s): POCTGLUCOSE in the last 168 hours.    Hemoglobin A1C   Date Value Ref Range Status   08/27/2019 5.6 4.0 - 5.6 % Final     Comment:     ADA Screening Guidelines:  5.7-6.4%  Consistent with prediabetes  >or=6.5%  Consistent with diabetes  High levels of fetal hemoglobin interfere with the HbA1C  assay. Heterozygous hemoglobin variants (HbS, HgC, etc)do  not significantly interfere with this  assay.   However, presence of multiple variants may affect accuracy.     03/13/2019 5.7 (H) 4.0 - 5.6 % Final     Comment:     ADA Screening Guidelines:  5.7-6.4%  Consistent with prediabetes  >or=6.5%  Consistent with diabetes  High levels of fetal hemoglobin interfere with the HbA1C  assay. Heterozygous hemoglobin variants (HbS, HgC, etc)do  not significantly interfere with this assay.   However, presence of multiple variants may affect accuracy.     08/24/2017 5.5 4.0 - 5.6 % Final     Comment:     According to ADA guidelines, hemoglobin A1c <7.0% represents  optimal control in non-pregnant diabetic patients. Different  metrics may apply to specific patient populations.   Standards of Medical Care in Diabetes-2016.  For the purpose of screening for the presence of diabetes:  <5.7%     Consistent with the absence of diabetes  5.7-6.4%  Consistent with increasing risk for diabetes   (prediabetes)  >or=6.5%  Consistent with diabetes  Currently, no consensus exists for use of hemoglobin A1c  for diagnosis of diabetes for children.  This Hemoglobin A1c assay has significant interference with fetal   hemoglobin   (HbF). The results are invalid for patients with abnormal amounts of   HbF,   including those with known Hereditary Persistence   of Fetal Hemoglobin. Heterozygous hemoglobin variants (HbAS, HbAC,   HbAD, HbAE, HbA2) do not significantly interfere with this assay;   however, presence of multiple variants in a sample may impact the %   interference.          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  Work up for infection has been negative and the clinical picture is less consistent with acute infection.  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    · Slowly wean prednisone as tolerated.  · Now on sulfasalazine as per Dr. Rouse (Rheumatology).  It is probably reasonable to avoid biologics unless absolutely necessary given prior flare after infliximab.  · Repeat CXR from 12/25/2018 shows continued clearing.  · Consider repeat chest CT (non-contrast) once prednisone is at stable lowest dose possible.      Altered mental status [R41.82]  Yes    Chronic renal failure syndrome, stage 3 (moderate) [N18.3]  Yes    Essential hypertension [I10]  Yes      Resolved Hospital Problems   No resolved problems to display.          Assessment and Plan for problems addressed today:      amLODIPine  5 mg Oral Daily    aspirin  81 mg Oral Daily    atorvastatin  40 mg Oral Daily    carvedilol  12.5 mg Oral BID WM    clopidogrel  75 mg Oral Daily    heparin (porcine)  5,000 Units Subcutaneous Q8H    hydroxychloroquine  200 mg Oral BID    montelukast  10 mg Oral QHS    mycophenolate  500 mg Oral BID    pantoprazole  40 mg Oral Daily    predniSONE  25 mg Oral Daily     acetaminophen, albuterol, labetalol, ondansetron, polyethylene glycol, sodium chloride 0.9%, sodium chloride 0.9%    Acute toxic/metabolic Encephalopathy:  Improving  -Etiology:  Infectious vs. Neurologic vs. Iatrogenic  -CT head, MRI/MRA brain nonacute.  Cerebral volume loss concerning for chronic ischemic changes  -EEG without any epileptiform activity, but does show slowing consistent with moderate encephalopathy  -Medication additions or changes could be affecting presentation, as patient was given multiple doses of baclofen and gabapentin.  Hold any sedating medications at this time.  -no antiepileptics for now  -consulted Neurology, appreciate recommendations  -aspiration precautions, fall precautions, seizure precautions  -neuro checks  -Continue correction of metabolic  "derangements  -Maintain Delirium precautions: Maintain regular sleep cycle. Early ambulation. Minimal interruptions overnight. Re-orient patient frequently. Maintain adequate bowel regimen, hydration and electrolyte replenishments. Hearing Aids and eye glasses as needed.     Seropositive rheumatoid arthritis of multiple sites  Long-term use of immunosuppressant medication  -On home plaquenil, mycophenolate, and prednisone daily for RA  -follows with Dr. Rouse rheumatology  -hx of  2/2 RA     Essential hypertension  -stable  -Continue PTA  amlodipine, Coreg  -monitor vitals q4h  -SBP goal of <160 in hospital    Dystonia  -Follows with Dr. Giang of Neurology  -Hold Gabapentin and Baclofen  for now given AMS     History of stroke  -Chronic and stable  -Continue Aspirin 81mg PO daily  -Continue Plavix 75mg PO daily    GERD:  -continue PPI    Hyperlipidemia:  -continue PTA atorvastatin 40 mg      Pt's hospitalization is medically necessary due to acute encephalopathy from concerns for seizures with superimposed toxic encephalopathy from diazepam, baclofen, gabapentin.  Patient was noted to be somnolent, and have worsening debility, muscle weakness.  Per neurology "pt's presentation is concerning for ENCP.  DIfDx includes new radiographically negative CVA, occult infectious/metabolic derangement, iatrogenic(polypharmacy:  Diazepam, baclofen) and potential epileptic activity"    DVT PPx:  Heparin    Discharge plan and follow up: DC to SNF medically stable     Corie Singer MD  Hospital Medicine Staff  [phone] pager        "

## 2019-08-30 NOTE — PLAN OF CARE
Problem: Physical Therapy Goal  Goal: Physical Therapy Goal  Goals to be met by: 9/10/19     Patient will increase functional independence with mobility by performin. Supine to sit with Stand-by Assistance  2. Sit to supine with Stand-by Assistance  3. Sit to stand transfer with Supervision  4. Bed to chair transfer with Supervision using Rolling Walker  5. Gait  x 100 feet with Stand-by Assistance using Rolling Walker.   6. Ascend/descend 5 stair with bilateral Handrails Contact Guard Assistance     Outcome: Ongoing (interventions implemented as appropriate)  Pt. Progressing towards goals

## 2019-08-30 NOTE — PT/OT/SLP PROGRESS
Occupational Therapy      Patient Name:  Selma Alonzo Lux MD   MRN:  [id number]    Chart review completed. Pt remains appropriate for skilled OT services. Will follow up on next scheduled visit per OT POC.       Cora Oliver OT  8/30/2019

## 2019-08-30 NOTE — PT/OT/SLP PROGRESS
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 female admitted with a medical diagnosis of Acute encephalopathy.  She presents with the following impairments/functional limitations:  weakness, impaired endurance, impaired self care skills, impaired functional mobilty, gait instability, impaired balance, decreased safety awareness, impaired cognition Pt. cooperative and tolerated treatment fairly well except for decreased alertness. Pt. progressing with mobility.    Rehab Prognosis: Good; patient would benefit from acute skilled PT services to address these deficits and reach maximum level of function.    Recent Surgery: * No surgery found *      Plan:     During this hospitalization, patient to be seen 4 x/week to address the identified rehab impairments via gait training, therapeutic activities, therapeutic exercises and progress toward the following goals:    · Plan of Care Expires:  09/26/19    Subjective     Chief Complaint: none  Patient/Family Comments/goals: to be more indep.  Pain/Comfort:  · Pain Rating 1: 0/10  · Pain Rating Post-Intervention 1: 0/10      Objective:     Communicated with nursing prior to session.  Patient found supine with peripheral IV, telemetry upon PT entry to room.     General Precautions: Standard, aspiration, fall   Orthopedic Precautions:N/A   Braces:       Functional Mobility:  · Bed Mobility:     · Rolling Left:  minimum assistance  · Scooting: minimum assistance  · Supine to Sit: moderate assistance  · Sit to Supine: moderate assistance  · Transfers:     · Sit to Stand:  minimum assistance with rolling walker  · Bed to Chair: minimum assistance with  rolling walker  using  Stand Pivot  · Gait: 15' with RW and Min A x2 trials. Pt. had  seated rest break between gait trials. Pt. amb. with flexed posture and required assist for guidance and RW management.  · Balance: fair- standing      AM-PAC 6 CLICK MOBILITY  Turning over in bed (including adjusting bedclothes, sheets and blankets)?: 3  Sitting down on and standing up from a chair with arms (e.g., wheelchair, bedside commode, etc.): 3  Moving from lying on back to sitting on the side of the bed?: 2  Moving to and from a bed to a chair (including a wheelchair)?: 3  Need to walk in hospital room?: 3  Climbing 3-5 steps with a railing?: 2  Basic Mobility Total Score: 16       Therapeutic Activities and Exercises:  Assisted pt. with marialuisa-care and getting on/off toilet. Discussed pt.'s progress, goals, and POC.    Patient left supine with all lines intact and call button in reach..    GOALS:   Multidisciplinary Problems     Physical Therapy Goals        Problem: Physical Therapy Goal    Goal Priority Disciplines Outcome Goal Variances Interventions   Physical Therapy Goal     PT, PT/OT Ongoing (interventions implemented as appropriate)     Description:  Goals to be met by: 9/10/19     Patient will increase functional independence with mobility by performin. Supine to sit with Stand-by Assistance  2. Sit to supine with Stand-by Assistance  3. Sit to stand transfer with Supervision  4. Bed to chair transfer with Supervision using Rolling Walker  5. Gait  x 100 feet with Stand-by Assistance using Rolling Walker.   6. Ascend/descend 5 stair with bilateral Handrails Contact Guard Assistance                      Time Tracking:     PT Received On: 19  PT Start Time: 1042     PT Stop Time: 1105  PT Total Time (min): 23 min     Billable Minutes: Gait Training 15 and Therapeutic Activity 8    Treatment Type: Treatment  PT/PTA: PT     PTA Visit Number: 0     Baldomero Farah, PT  2019

## 2019-08-30 NOTE — PLAN OF CARE
08/30/19 1325   Post-Acute Status   Post-Acute Authorization Placement   Post-Acute Placement Status Referrals Sent     [facility]aye with [facility] can accept pt over weekend if stable, Sw to inform staff and follow. Shaye informed Sw that Pt may be able to d/c Monday or Tuesday as facility wont have discharges they thought would happen to end of the weekend. CM/Sw on call to follow, Sw to update family. Sw updated Rosy Rosado ([phone]).

## 2019-08-30 NOTE — UM SECONDARY REVIEW
Physician Advisor Internal    Observation > 48 hours    Treatment/Care recommendations given     CM sent case to Dr. Myke Farah for secondary review.  Patient has been admitted to OBS sine 8/26/19.  Patient lacking medical critiera to upgrade to IP level of care but possibly will remain in house until early next week.      Dr. Myke Farah provided insight that medical necessity needs to be documented to justify patients time in OBS and need for inpatient rehab.  CM communicated above to IM D physician team.  Dr. Singer agreeable to document further regarding patient condition and concerns for patient well being if discharged to home.  Dr. Singer to place upgrade orders - admit to inpatient.  CM to email admitting to request inpatient authorization process.    Kayleen Arellano RN, BSN, [facility]  Utilization Review Case Management  [facility]  [phone]  Fax [phone]

## 2019-08-30 NOTE — CONSULTS
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 Vascular neurology evaluated patient for possible stroke/TIA which their work up was unremarkable. MRI brain no evidence of acute infarctions. MRA brain/neck without evidence for significant focal stenosis or occlusion.   Family reported patient has episodes of staring off periodically at home. EEG done at this admission shows generalized slowing c/w encephalopathy with no epileptiform discharge.  Over this admission patient with no electrolyte/metabolic abnormality or active infection. Although patient has been having high BP in the first few days at hospital ranging between 170-200 and also received increased dose of baclofen in this admission which contributed to more somnolence.  Patient had a similar presentation in12/2018 which patient was admitted to ICU for hypertensive encephalopathy vs viral encephalopathy(based on CSF results). Patient had an EEG which was negative for epileptiform activity.        Past Medical History:   Diagnosis Date    Acute hypoxemic respiratory failure 4/11/2018    Altered mental status     Anemia     Arthritis     Bilateral hand pain 3/14/2018    Branch retinal vein occlusion, left eye 2/20/2015    Chronic bilateral low back pain without sciatica 3/23/2017    Chronic renal failure in pediatric patient, stage 3 (moderate) 4/15/2018    Cognitive communication deficit  12/19/2017    Cystoid macular edema, left eye 2/20/2015    Cystoid macular edema, left eye 2/20/2015    DJD (degenerative joint disease) of cervical spine 5/15/2013    Fatty liver 8/26/2014    Goiter 4/9/2018    Hashimoto's disease     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    Hypertension     Hypertensive encephalopathy     IBS (irritable bowel syndrome) 6/21/2017    IGT (impaired glucose tolerance) 1/12/2016    Iron deficiency anemia secondary to inadequate dietary iron intake 6/24/2013    Joint pain     Keratoconjunctivitis sicca of both eyes not specified as Sjogren's 7/29/2016    Leiomyoma of uterus, unspecified 9/16/2014    Long QT interval 6/29/2016    Due to medication (plaquenil)     Macular edema 1/12/2015    Multinodular goiter 1/12/2016    Neuropathy 8/23/2017    Plaquenil causing adverse effect in therapeutic use 10/7/2016    Pneumococcal vaccine refused 8/17/2016    Pneumonia due to Streptococcus pneumoniae 4/5/2018    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/2015    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    s/p Right Total knee replacement 5/15/2013    Scoliosis of thoracic spine 5/15/2013    Small vessel disease, cerebrovascular 12/28/2017    Stroke     UTI (urinary tract infection) 12/29/2018    Vascular dementia 12/6/2017       Past Surgical History:   Procedure Laterality Date    BREAST CYST EXCISION      CATARACT EXTRACTION      COLONOSCOPY N/A 9/29/2015    Performed by FIDELINA Sanchez MD at [facility] (4TH FLR)    EGD (ESOPHAGOGASTRODUODENOSCOPY) N/A 3/11/2014    Performed by Federico Escobar MD at [facility] END[facility] (4TH FLR)    EGD (ESOPHAGOGASTRODUODENOSCOPY) N/A 11/5/2013    Performed by [facility]ico [facility]obar [facility] at [facility] END[facility] (4TH FLR)    ESOPHAGOGASTRODUODENOSCOPY (EGD) N/A 10/4/2017    [facility]    EYE SURGERY      INJECTION-STEROID-EPIDURAL-TRANSFORAMINAL Right 9/20/2017    [facility]    JOINT  REPLACEMENT      right knee    KNEE SURGERY Left 12/31/2013    TKR    left parotidectomy      mixed tumor    SALIVARY GLAND SURGERY      TONSILLECTOMY         Review of patient's allergies indicates:  No Known Allergies    Current Neurological Medications: neurontin    No current facility-administered medications on file prior to encounter.      Current Outpatient Medications on File Prior to Encounter   Medication Sig    acetaminophen (TYLENOL) 500 MG tablet Take 1,000 mg by mouth daily as needed for Pain.    albuterol (PROVENTIL/VENTOLIN HFA) 90 mcg/actuation inhaler Inhale 2 puffs into the lungs every 6 (six) hours as needed for Wheezing. Rescue    aspirin (ECOTRIN) 81 MG EC tablet Take 81 mg by mouth once daily.    carvedilol (COREG) 12.5 MG tablet Take 1 tablet (12.5 mg total) by mouth 2 (two) times daily with meals.    clopidogrel (PLAVIX) 75 mg tablet Take 1 tablet (75 mg total) by mouth once daily.    furosemide (LASIX) 20 MG tablet TAKE 1 TABLET BY MOUTH EVERY DAY AT 6 AM    hydroxychloroquine (PLAQUENIL) 200 mg tablet Take 1 tablet (200 mg total) by mouth 2 (two) times daily.    linaclotide (LINZESS) 145 mcg Cap capsule Take 1 capsule (145 mcg total) by mouth once daily.    magnesium oxide (MAG-OX) 400 mg (241.3 mg magnesium) tablet Take 1 tablet (400 mg total) by mouth once daily.    montelukast (SINGULAIR) 10 mg tablet Take 1 tablet (10 mg total) by mouth every evening.    mycophenolate (CELLCEPT) 500 mg Tab Take 1 tablet (500 mg total) by mouth 2 (two) times daily.    omeprazole (PRILOSEC) 20 MG capsule Take 1 capsule (20 mg total) by mouth once daily.    potassium chloride SA (K-DUR,KLOR-CON) 10 MEQ tablet Take 2 tablets (20 mEq total) by mouth once daily.    sulfaSALAzine (AZULFIDINE) 500 MG TbEC Take 2 tablets (1,000 mg total) by mouth 2 (two) times daily.    [DISCONTINUED] baclofen (LIORESAL) 10 MG tablet Take 1 tablet (10 mg total) by mouth 3 (three) times daily.     [DISCONTINUED] diazePAM (VALIUM) 2 MG tablet Take 1 tablet (2 mg total) by mouth nightly as needed for Anxiety.    [DISCONTINUED] furosemide (LASIX) 40 MG tablet     [DISCONTINUED] gabapentin (NEURONTIN) 300 MG capsule Take 1 capsule (300 mg total) by mouth every evening.    [DISCONTINUED] predniSONE (DELTASONE) 1 MG tablet Take 1 tablet (1 mg total) by mouth once daily.    [DISCONTINUED] predniSONE (DELTASONE) 10 MG tablet Take 20 mg by mouth once daily.    [DISCONTINUED] traMADol (ULTRAM) 50 mg tablet Take 1-2 tablets ( mg total) by mouth every 24 hours as needed for Pain.     Family History     Problem Relation (Age of Onset)    Breast cancer Maternal Grandmother    Cancer Father    Heart disease Mother    Hypertension Mother    Prostate cancer Father        Tobacco Use    Smoking status: Never Smoker    Smokeless tobacco: Never Used   Substance and Sexual Activity    Alcohol use: No     Alcohol/week: 0.0 oz     Frequency: Monthly or less     Drinks per session: 1 or 2     Binge frequency: Never     Comment: very seldom     Drug use: No    Sexual activity: Never     Comment: ,  age 52,      Review of Systems   Unable to perform ROS: Mental status change   Constitutional: Positive for activity change. Negative for chills, fatigue and fever.   HENT: Negative for voice change.    Eyes: Negative for visual disturbance.   Respiratory: Negative for shortness of breath.    Cardiovascular: Negative for chest pain, palpitations and leg swelling.   Gastrointestinal: Negative for abdominal pain.   Musculoskeletal: Negative for neck pain and neck stiffness.   Skin: Negative for rash.   Neurological: Negative for dizziness, tremors, seizures, facial asymmetry, light-headedness and numbness.     Objective:     Vital Signs (Most Recent):  Temp: 98.7 °F (37.1 °C) (08/30/19 1545)  Pulse: 76 (08/30/19 1612)  Resp: 18 (08/30/19 1545)  BP: (!) 108/58 (08/30/19 1545)  SpO2: 99 % (08/30/19 1545) Vital Signs  (24h Range):  Temp:  [97.6 °F (36.4 °C)-98.7 °F (37.1 °C)] 98.7 °F (37.1 °C)  Pulse:  [74-90] 76  Resp:  [16-18] 18  SpO2:  [95 %-100 %] 99 %  BP: (108-141)/(55-73) 108/58     Weight: 80.3 kg (177 lb)  Body mass index is 29.45 kg/m².    Physical Exam   Constitutional: She appears well-developed and well-nourished. No distress.   HENT:   Head: Normocephalic and atraumatic.   Eyes: Pupils are equal, round, and reactive to light. Conjunctivae and EOM are normal.   Neck: Normal range of motion.   Cardiovascular: Normal rate.   Pulmonary/Chest: Effort normal.   Abdominal: Soft.   Musculoskeletal: Normal range of motion.   Neurological:   Reflex Scores:       Patellar reflexes are 1+ on the right side and 1+ on the left side.  Skin: She is not diaphoretic.   Psychiatric: Her speech is slurred.       NEUROLOGICAL EXAMINATION:     MENTAL STATUS   Attention: decreased.   Speech: slurred   Level of consciousness: arousable by tactile stimuli    CRANIAL NERVES     CN III, IV, VI   Pupils are equal, round, and reactive to light.  Extraocular motions are normal.   Right pupil: Size: 2 mm.   Left pupil: Size: 2 mm.   CN III: no CN III palsy  CN VI: no CN VI palsy  Nystagmus: none     CN VIII   Hearing: intact    MOTOR EXAM   Muscle bulk: normal  Overall muscle tone: normal    REFLEXES     Reflexes   Right patellar: 1+  Left patellar: 1+  Right plantar: normal  Left plantar: normal      Significant Labs:   CMP:   Recent Labs   Lab 08/29/19  0441   GLU 51*   *   K 4.5      CO2 18*   BUN 21   CREATININE 1.1   CALCIUM 9.2   PROT 6.4   ALBUMIN 3.7   BILITOT 0.9   ALKPHOS 48*   AST 16   ALT 10   ANIONGAP 16   EGFRNONAA 47.2*     Urine Studies: No results for input(s): COLORU, APPEARANCEUA, PHUR, SPECGRAV, PROTEINUA, GLUCUA, KETONESU, BILIRUBINUA, OCCULTUA, NITRITE, UROBILINOGEN, LEUKOCYTESUR, RBCUA, WBCUA, BACTERIA, SQUAMEPITHEL, HYALINECASTS in the last 48 hours.    Invalid input(s): WRIGHTSUR    Significant Imaging: I  have reviewed and interpreted all pertinent imaging results/findings within the past 24 hours.    Assessment and Plan:     Altered mental status  84 yo female with vascular dementoia nd history of encephalopathy secondary to HTN presented with acute onset AMS and dysarthria. Neuroimaging unremarkable for vascular events. EEG withshows generalized slowing c/w encephalopathy with no epileptiform discharge.  Over this admission patient with no electrolyte/metabolic abnormality or active infection. Although patient has been having high BP in the first few days at hospital ranging between 170-200 and also received increased dose of baclofen in this admission which contributed to more somnolence.  Patient's presentation is c/w encephalopathy which could be 2/2 high BP and worsened with high dose baclofen. Slight hyponatremia also could be contributing ti the AMS picture. Seizure based on history, and presentation is less likely. Would not start AED.  Mental status improving    Recommendations:  - avoid benzoes  - correct the metabolic derangements  - sleep/ wake cycle  - seizure precaution, hold AED for now   - delirium precaution  - frequent orientation   - Continue secondary CVA prevention: ASA+Plavix, atorvastatin 80mg daily, carvedilol/amlodipine          VTE Risk Mitigation (From admission, onward)        Ordered     heparin (porcine) injection 5,000 Units  Every 8 hours      08/27/19 0420     IP VTE HIGH RISK PATIENT  Once      08/27/19 0420     Place sequential compression device  Until discontinued      08/27/19 0420          Thank you for your consult. I will sign off. Please contact us if you have any additional questions.    Grace Block MD  Neurology  [facility]

## 2019-08-30 NOTE — SUBJECTIVE & OBJECTIVE
Past Medical History:   Diagnosis Date    Acute hypoxemic respiratory failure 4/11/2018    Altered mental status     Anemia     Arthritis     Bilateral hand pain 3/14/2018    Branch retinal vein occlusion, left eye 2/20/2015    Chronic bilateral low back pain without sciatica 3/23/2017    Chronic renal failure in pediatric patient, stage 3 (moderate) 4/15/2018    Cognitive communication deficit 12/19/2017    Cystoid macular edema, left eye 2/20/2015    Cystoid macular edema, left eye 2/20/2015    DJD (degenerative joint disease) of cervical spine 5/15/2013    Fatty liver 8/26/2014    Goiter 4/9/2018    Hashimoto's disease     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    Hypertension     Hypertensive encephalopathy     IBS (irritable bowel syndrome) 6/21/2017    IGT (impaired glucose tolerance) 1/12/2016    Iron deficiency anemia secondary to inadequate dietary iron intake 6/24/2013    Joint pain     Keratoconjunctivitis sicca of both eyes not specified as Sjogren's 7/29/2016    Leiomyoma of uterus, unspecified 9/16/2014    Long QT interval 6/29/2016    Due to medication (plaquenil)     Macular edema 1/12/2015    Multinodular goiter 1/12/2016    Neuropathy 8/23/2017    Plaquenil causing adverse effect in therapeutic use 10/7/2016    Pneumococcal vaccine refused 8/17/2016    Pneumonia due to Streptococcus pneumoniae 4/5/2018    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/2015    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    s/p Right Total knee replacement 5/15/2013    Scoliosis of thoracic spine 5/15/2013    Small vessel disease, cerebrovascular 12/28/2017    Stroke     UTI (urinary tract infection) 12/29/2018    Vascular dementia 12/6/2017       Past Surgical History:   Procedure Laterality Date    BREAST CYST EXCISION      CATARACT EXTRACTION      COLONOSCOPY N/A 9/29/2015    Performed by FIDELINA Sanchez MD at [facility] ([facility] FLR)    EGD (ESOPHAGOGASTRODUODENOSCOPY) N/A 3/11/2014     Performed by Federico Escobar MD at [facility] ENDO (4TH FLR)    EGD (ESOPHAGOGASTRODUODENOSCOPY) N/A 11/5/2013    Performed by Federico Escobar MD at [facility] ENDO (4TH FLR)    ESOPHAGOGASTRODUODENOSCOPY (EGD) N/A 10/4/2017    Performed by Mg Morton MD at [facility] ENDO    EYE SURGERY      INJECTION-STEROID-EPIDURAL-TRANSFORAMINAL Right 9/20/2017    Performed by [facility]elin Valdez [facility] at [facility] PAIN MGT    JOINT REPLACEMENT      right knee    KNEE SURGERY Left 12/31/2013    TKR    left parotidectomy      mixed tumor    SALIVARY GLAND SURGERY      TONSILLECTOMY         Review of patient's allergies indicates:  No Known Allergies    Current Neurological Medications: neurontin    No current facility-administered medications on file prior to encounter.      Current Outpatient Medications on File Prior to Encounter   Medication Sig    acetaminophen (TYLENOL) 500 MG tablet Take 1,000 mg by mouth daily as needed for Pain.    albuterol (PROVENTIL/VENTOLIN HFA) 90 mcg/actuation inhaler Inhale 2 puffs into the lungs every 6 (six) hours as needed for Wheezing. Rescue    aspirin (ECOTRIN) 81 MG EC tablet Take 81 mg by mouth once daily.    carvedilol (COREG) 12.5 MG tablet Take 1 tablet (12.5 mg total) by mouth 2 (two) times daily with meals.    clopidogrel (PLAVIX) 75 mg tablet Take 1 tablet (75 mg total) by mouth once daily.    furosemide (LASIX) 20 MG tablet TAKE 1 TABLET BY MOUTH EVERY DAY AT 6 AM    hydroxychloroquine (PLAQUENIL) 200 mg tablet Take 1 tablet (200 mg total) by mouth 2 (two) times daily.    linaclotide (LINZESS) 145 mcg Cap capsule Take 1 capsule (145 mcg total) by mouth once daily.    magnesium oxide (MAG-OX) 400 mg (241.3 mg magnesium) tablet Take 1 tablet (400 mg total) by mouth once daily.    montelukast (SINGULAIR) 10 mg tablet Take 1 tablet (10 mg total) by mouth every evening.    mycophenolate (CELLCEPT) 500 mg Tab Take 1 tablet (500 mg total) by mouth 2 (two) times daily.    omeprazole (PRILOSEC) 20  MG capsule Take 1 capsule (20 mg total) by mouth once daily.    potassium chloride SA (K-DUR,KLOR-CON) 10 MEQ tablet Take 2 tablets (20 mEq total) by mouth once daily.    sulfaSALAzine (AZULFIDINE) 500 MG TbEC Take 2 tablets (1,000 mg total) by mouth 2 (two) times daily.    [DISCONTINUED] baclofen (LIORESAL) 10 MG tablet Take 1 tablet (10 mg total) by mouth 3 (three) times daily.    [DISCONTINUED] diazePAM (VALIUM) 2 MG tablet Take 1 tablet (2 mg total) by mouth nightly as needed for Anxiety.    [DISCONTINUED] furosemide (LASIX) 40 MG tablet     [DISCONTINUED] gabapentin (NEURONTIN) 300 MG capsule Take 1 capsule (300 mg total) by mouth every evening.    [DISCONTINUED] predniSONE (DELTASONE) 1 MG tablet Take 1 tablet (1 mg total) by mouth once daily.    [DISCONTINUED] predniSONE (DELTASONE) 10 MG tablet Take 20 mg by mouth once daily.    [DISCONTINUED] traMADol (ULTRAM) 50 mg tablet Take 1-2 tablets ( mg total) by mouth every 24 hours as needed for Pain.     Family History     Problem Relation (Age of Onset)    Breast cancer Maternal Grandmother    Cancer Father    Heart disease Mother    Hypertension Mother    Prostate cancer Father        Tobacco Use    Smoking status: Never Smoker    Smokeless tobacco: Never Used   Substance and Sexual Activity    Alcohol use: No     Alcohol/week: 0.0 oz     Frequency: Monthly or less     Drinks per session: 1 or 2     Binge frequency: Never     Comment: very seldom     Drug use: No    Sexual activity: Never     Comment: ,  age 52,      Review of Systems   Unable to perform ROS: Mental status change   Constitutional: Positive for activity change. Negative for chills, fatigue and fever.   HENT: Negative for voice change.    Eyes: Negative for visual disturbance.   Respiratory: Negative for shortness of breath.    Cardiovascular: Negative for chest pain, palpitations and leg swelling.   Gastrointestinal: Negative for abdominal pain.   Musculoskeletal:  Negative for neck pain and neck stiffness.   Skin: Negative for rash.   Neurological: Negative for dizziness, tremors, seizures, facial asymmetry, light-headedness and numbness.     Objective:     Vital Signs (Most Recent):  Temp: 98.7 °F (37.1 °C) (08/30/19 1545)  Pulse: 76 (08/30/19 1612)  Resp: 18 (08/30/19 1545)  BP: (!) 108/58 (08/30/19 1545)  SpO2: 99 % (08/30/19 1545) Vital Signs (24h Range):  Temp:  [97.6 °F (36.4 °C)-98.7 °F (37.1 °C)] 98.7 °F (37.1 °C)  Pulse:  [74-90] 76  Resp:  [16-18] 18  SpO2:  [95 %-100 %] 99 %  BP: (108-141)/(55-73) 108/58     Weight: 80.3 kg (177 lb)  Body mass index is 29.45 kg/m².    Physical Exam   Constitutional: She appears well-developed and well-nourished. No distress.   HENT:   Head: Normocephalic and atraumatic.   Eyes: Pupils are equal, round, and reactive to light. Conjunctivae and EOM are normal.   Neck: Normal range of motion.   Cardiovascular: Normal rate.   Pulmonary/Chest: Effort normal.   Abdominal: Soft.   Musculoskeletal: Normal range of motion.   Neurological:   Reflex Scores:       Patellar reflexes are 1+ on the right side and 1+ on the left side.  Skin: She is not diaphoretic.   Psychiatric: Her speech is slurred.       NEUROLOGICAL EXAMINATION:     MENTAL STATUS   Attention: decreased.   Speech: slurred   Level of consciousness: arousable by tactile stimuli    CRANIAL NERVES     CN III, IV, VI   Pupils are equal, round, and reactive to light.  Extraocular motions are normal.   Right pupil: Size: 2 mm.   Left pupil: Size: 2 mm.   CN III: no CN III palsy  CN VI: no CN VI palsy  Nystagmus: none     CN VIII   Hearing: intact    MOTOR EXAM   Muscle bulk: normal  Overall muscle tone: normal    REFLEXES     Reflexes   Right patellar: 1+  Left patellar: 1+  Right plantar: normal  Left plantar: normal      Significant Labs:   CMP:   Recent Labs   Lab 08/29/19  0441   GLU 51*   *   K 4.5      CO2 18*   BUN 21   CREATININE 1.1   CALCIUM 9.2   PROT 6.4    ALBUMIN 3.7   BILITOT 0.9   ALKPHOS 48*   AST 16   ALT 10   ANIONGAP 16   EGFRNONAA 47.2*     Urine Studies: No results for input(s): COLORU, APPEARANCEUA, PHUR, SPECGRAV, PROTEINUA, GLUCUA, KETONESU, BILIRUBINUA, OCCULTUA, NITRITE, UROBILINOGEN, LEUKOCYTESUR, RBCUA, WBCUA, BACTERIA, SQUAMEPITHEL, HYALINECASTS in the last 48 hours.    Invalid input(s): LATESHA    Significant Imaging: I have reviewed and interpreted all pertinent imaging results/findings within the past 24 hours.

## 2019-08-30 NOTE — NURSING
Plan of care reviewed with pt and daughter. Daughter remain at bedside. Intermittent confusion.lethergy noted. Able to ambulate to commode with standy by assist. Accepted meds crushed with several prompts to swallow. Adult brief in place, kept dry per dtaff. Encouraged to call for assist. Bed in low position. Call bell in reach. Side rails up x2. Will continue to monitor.

## 2019-08-30 NOTE — PT/OT/SLP PROGRESS
"Speech Language Pathology Treatment    Patient Name:  Selma Alonzo Lux MD   MRN:  [id number]  Admitting Diagnosis: Acute encephalopathy    Recommendations:                 General Recommendations:  Cognitive-linguistic therapy  Diet recommendations:  Regular, Liquid Diet Level: Thin   Aspiration Precautions: HOB to 90 degrees and Standard aspiration precautions   General Precautions: Standard, aspiration, fall  Communication strategies:  none    Subjective     "she is much more awake"  Per spouse  Patient goals: home    Pain/Comfort:  · Pain Rating 1: 0/10  · Pain Rating Post-Intervention 1: 0/10    Objective:     Has the patient been evaluated by SLP for swallowing?   Yes  Keep patient NPO? No   Current Respiratory Status: room air      \Pt. Seen at bedside and was sleepy though did rouse and participate in conversation with therapist.  Speech was 100% intelligible in conversation.  Vocal quality and intensity were wfl.  She was oriented to time and place with good recall of recent events.  Hob was raised to 90 degree angle and pt. Was observed swallowing all pills with jucie and water.  Oral and pharyngeal phases swallow were wfl with no coughing, throat clearing or change in vocal quality following the swallow.          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 female with oral and pharyngeal phases of swallow wfl.      Goals:   Multidisciplinary Problems     SLP Goals        Problem: SLP Goal    Goal Priority Disciplines Outcome   SLP Goal     SLP Ongoing (interventions implemented as appropriate)   Description:  Speech Language Pathology Goals  Goals expected to be met by 9/3  1. Pt will Ox3 given min A/external aids.   2. Pt will complete mod complex comprehension tasks with 80% accy.  3. Pt will complete further assessment of mod complex word finding to determine need for tx.    4. Pt will participate in ongoing swallow assessment                          Plan:     · Patient to be seen:  3 " x/week   · Plan of Care expires:  09/28/19  · Plan of Care reviewed with:  patient, daughter   · SLP Follow-Up:  Yes       Discharge recommendations:  home health speech therapy     Time Tracking:     SLP Treatment Date:   08/30/19  Speech Start Time:  1035  Speech Stop Time:  1045     Speech Total Time (min):  10 min    Billable Minutes: Treatment Swallowing Dysfunction 10    Do Simmons MA, CCC-SLP  08/30/2019

## 2019-08-31 ENCOUNTER — EXTERNAL CHRONIC CARE MANAGEMENT (OUTPATIENT)
Dept: PRIMARY CARE CLINIC | Facility: CLINIC | Age: 84
End: 2019-08-31
Payer: MEDICARE

## 2019-08-31 PROCEDURE — 63600175 PHARM REV CODE 636 W HCPCS: Performed by: NURSE PRACTITIONER

## 2019-08-31 PROCEDURE — 25000003 PHARM REV CODE 250: Performed by: NURSE PRACTITIONER

## 2019-08-31 PROCEDURE — 99490 CHRNC CARE MGMT STAFF 1ST 20: CPT | Mod: S$PBB,,, | Performed by: FAMILY MEDICINE

## 2019-08-31 PROCEDURE — 99232 PR SUBSEQUENT HOSPITAL CARE,LEVL II: ICD-10-PCS | Mod: GC,,, | Performed by: PSYCHIATRY & NEUROLOGY

## 2019-08-31 PROCEDURE — 11000001 HC ACUTE MED/SURG PRIVATE ROOM

## 2019-08-31 PROCEDURE — 99490 PR CHRONIC CARE MGMT, 1ST 20 MIN: ICD-10-PCS | Mod: S$PBB,,, | Performed by: FAMILY MEDICINE

## 2019-08-31 PROCEDURE — 99232 SBSQ HOSP IP/OBS MODERATE 35: CPT | Mod: ,,, | Performed by: HOSPITALIST

## 2019-08-31 PROCEDURE — 63600175 PHARM REV CODE 636 W HCPCS: Performed by: HOSPITALIST

## 2019-08-31 PROCEDURE — 25000003 PHARM REV CODE 250: Performed by: HOSPITALIST

## 2019-08-31 PROCEDURE — 99232 SBSQ HOSP IP/OBS MODERATE 35: CPT | Mod: GC,,, | Performed by: PSYCHIATRY & NEUROLOGY

## 2019-08-31 PROCEDURE — 99490 CHRNC CARE MGMT STAFF 1ST 20: CPT | Mod: PBBFAC | Performed by: FAMILY MEDICINE

## 2019-08-31 PROCEDURE — 99232 PR SUBSEQUENT HOSPITAL CARE,LEVL II: ICD-10-PCS | Mod: ,,, | Performed by: HOSPITALIST

## 2019-08-31 RX ORDER — HYDROXYZINE PAMOATE 25 MG/1
25 CAPSULE ORAL NIGHTLY PRN
Status: DISCONTINUED | OUTPATIENT
Start: 2019-08-31 | End: 2019-09-01

## 2019-08-31 RX ADMIN — HYDROXYCHLOROQUINE SULFATE 200 MG: 200 TABLET, FILM COATED ORAL at 09:08

## 2019-08-31 RX ADMIN — CARVEDILOL 12.5 MG: 12.5 TABLET, FILM COATED ORAL at 05:08

## 2019-08-31 RX ADMIN — MYCOPHENOLATE MOFETIL 500 MG: 250 CAPSULE ORAL at 09:08

## 2019-08-31 RX ADMIN — CARVEDILOL 12.5 MG: 12.5 TABLET, FILM COATED ORAL at 09:08

## 2019-08-31 RX ADMIN — ATORVASTATIN CALCIUM 40 MG: 10 TABLET, FILM COATED ORAL at 09:08

## 2019-08-31 RX ADMIN — HYDROXYZINE PAMOATE 25 MG: 25 CAPSULE ORAL at 09:08

## 2019-08-31 RX ADMIN — HEPARIN SODIUM 5000 UNITS: 5000 INJECTION INTRAVENOUS; SUBCUTANEOUS at 10:08

## 2019-08-31 RX ADMIN — ASPIRIN 81 MG: 81 TABLET, COATED ORAL at 09:08

## 2019-08-31 RX ADMIN — PANTOPRAZOLE SODIUM 40 MG: 40 TABLET, DELAYED RELEASE ORAL at 09:08

## 2019-08-31 RX ADMIN — CLOPIDOGREL BISULFATE 75 MG: 75 TABLET, FILM COATED ORAL at 09:08

## 2019-08-31 RX ADMIN — HEPARIN SODIUM 5000 UNITS: 5000 INJECTION INTRAVENOUS; SUBCUTANEOUS at 03:08

## 2019-08-31 RX ADMIN — HEPARIN SODIUM 5000 UNITS: 5000 INJECTION INTRAVENOUS; SUBCUTANEOUS at 06:08

## 2019-08-31 RX ADMIN — PREDNISONE 25 MG: 5 TABLET ORAL at 09:08

## 2019-08-31 RX ADMIN — MONTELUKAST 10 MG: 10 TABLET, FILM COATED ORAL at 09:08

## 2019-08-31 RX ADMIN — AMLODIPINE BESYLATE 5 MG: 5 TABLET ORAL at 09:08

## 2019-08-31 NOTE — PLAN OF CARE
Problem: Adult Inpatient Plan of Care  Goal: Plan of Care Review  Outcome: Ongoing (interventions implemented as appropriate)  Pt. Resting in bed easily  arousable to verbal stimuli,oriented to person only, otherwise neuro exam WNL. Pleasant and cooperative with care. Pt. Has been non ambulatory,assisted to turn to her side incontinent diaper on,no breakdown noted to butt. Pt. Has red area to   Mid back turned to side to relieve pressure to site.Fall precautions observed bed low and locked side rails up x 2 continue current plan of care.

## 2019-08-31 NOTE — NURSING
Notified Daughter, Dr Madhavi Lomas of pts fall.also attempted to call Haley Rosado but received the answering machine and left message.pt continues to try to climb oob.bed in low position,rails up x3.bed alarm in use.Staffing notified to obtain a sitter for Dr Lux.

## 2019-08-31 NOTE — PLAN OF CARE
Problem: Adult Inpatient Plan of Care  Goal: Plan of Care Review  Outcome: Ongoing (interventions implemented as appropriate)  Patient AAOX2 vital signs stable. Safety and infection precautions maintained. Patient is comfortable at this time,bed is locked and in a low position with call light in reach. Will cont to monitor.

## 2019-08-31 NOTE — PROGRESS NOTES
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  She noted that she had a doctor's appointment earlier today and appeared normal, went home, ate a normal dinner, but then approximately 2 hr later at about 9:00 p.m. the patient shouted for help and her daughter found her in the hallway hunched over her walker.  She states that she appeared very weak, and when she sat her down on the toilet to rest the patient was unable to finish a sentence because of confusion, and then began to have slurred speech.  This whole episode lasted approximately 30 min, and while debating coming to the ED a resolved.  The daughter notes a previous episode similar to this or in December 2018, she was admitted with encephalopathy, there is concern about hypertensive emergency versus viral meningitis as a cause.  Patient has a long complicated medical history. She sees Dr Giang for cervical dystonia and Botox injections and Dr Tobias for memory loss. Upon examination but still slightly confused but moving all extremities.  Patient was initially admitted to the vascular Neurology service to rule out TIA.  MRI/MRA were negative for acute ischemia.  Patient underwent EEG which did not show epileptiform abnormalities.  However according to vascular Neurology, due to patient's prior episode with similar symptoms in December 2018, there is a concern for possible seizures.  Patient also has superimposed toxic encephalopathy from baclofen and gabapentin overuse.  Neurology consulted    Interval hx:   Pt was seen and examined at bedside.  Patient remained afebrile and hemodynamically stable  overnight.  This morning patient is awake, alert, oriented x4.  She is seen getting up out of bed to walk to the bathroom her own.  Patient has no acute complaints today    ROS (Positive in Bold, otherwise negative)  Constitutional: fever, chills, night sweats  CV: chest pain, edema, palpitations  Resp: SOB, cough, sputum production  GI: changes in appetite, NVDC, pain, melena, hematochezia, GERD, hematemesis  : Dysuria, hematuria, urinary urgency, frequency  MSK: arthralgia/myalgia, joint swelling  Neuro/Psych: anxiety, depression    PEx   Temp:  [96.8 °F (36 °C)-98.7 °F (37.1 °C)]   Pulse:  [74-94]   Resp:  [14-20]   BP: (108-153)/(58-80)   SpO2:  [95 %-99 %]      I & O (Last 24H):     Intake/Output Summary (Last 24 hours) at 8/31/2019 1505  Last data filed at 8/31/2019 0900  Gross per 24 hour   Intake 120 ml   Output --   Net 120 ml       General:  female  in no acute distress. Sitting up in bed.  Cooperative.  HEENT: NCAT. PERRL. EOMI. Sclera Anicteric.  CVS: RRR. Normal S1 S2. No murmurs  Pulm: CTAB. Normal respiratory effort. No wheezes, rhonchi, or crackles.  Abdomen: Soft. Non-distended. No tenderness to palpation. No rebound or guarding. +BS.  Extremities: No edema. No cyanosis. Full ROM.  Neuro: Alert, oriented x 4, Spont mvt of all extremities with no focal deficits noted.    No results found for this or any previous visit (from the past 24 hour(s)).    No results for input(s): POCTGLUCOSE in the last 168 hours.    Hemoglobin A1C   Date Value Ref Range Status   08/27/2019 5.6 4.0 - 5.6 % Final     Comment:     ADA Screening Guidelines:  5.7-6.4%  Consistent with prediabetes  >or=6.5%  Consistent with diabetes  High levels of fetal hemoglobin interfere with the HbA1C  assay. Heterozygous hemoglobin variants (HbS, HgC, etc)do  not significantly interfere with this assay.   However, presence of multiple variants may affect accuracy.     03/13/2019 5.7 (H) 4.0 - 5.6 % Final     Comment:      ADA Screening Guidelines:  5.7-6.4%  Consistent with prediabetes  >or=6.5%  Consistent with diabetes  High levels of fetal hemoglobin interfere with the HbA1C  assay. Heterozygous hemoglobin variants (HbS, HgC, etc)do  not significantly interfere with this assay.   However, presence of multiple variants may affect accuracy.     08/24/2017 5.5 4.0 - 5.6 % Final     Comment:     According to ADA guidelines, hemoglobin A1c <7.0% represents  optimal control in non-pregnant diabetic patients. Different  metrics may apply to specific patient populations.   Standards of Medical Care in Diabetes-2016.  For the purpose of screening for the presence of diabetes:  <5.7%     Consistent with the absence of diabetes  5.7-6.4%  Consistent with increasing risk for diabetes   (prediabetes)  >or=6.5%  Consistent with diabetes  Currently, no consensus exists for use of hemoglobin A1c  for diagnosis of diabetes for children.  This Hemoglobin A1c assay has significant interference with fetal   hemoglobin   (HbF). The results are invalid for patients with abnormal amounts of   HbF,   including those with known Hereditary Persistence   of Fetal Hemoglobin. Heterozygous hemoglobin variants (HbAS, HbAC,   HbAD, HbAE, HbA2) do not significantly interfere with this assay;   however, presence of multiple variants in a sample may impact the %   interference.          Active Hospital Problems    Diagnosis  POA    *Acute encephalopathy [G93.40]  Yes    Dyspnea [R06.00]  Yes    TIA (transient ischemic attack) [G45.9]  Unknown    Cryptogenic organizing pneumonia [J84.116]  Yes     Clinically suspected due to recurring infiltrates on CXR in the setting of rheumatoid arthritis.  Work up for infection has been negative and the clinical picture is less consistent with acute infection.  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    · Slowly wean prednisone as tolerated.  · Now on sulfasalazine as per Dr. Rouse (Rheumatology).  It is probably reasonable to avoid biologics unless absolutely necessary given prior flare after infliximab.  · Repeat CXR from 12/25/2018 shows continued clearing.  · Consider repeat chest CT (non-contrast) once prednisone is at stable lowest dose possible.      Altered mental status [R41.82]  Yes    Medication side effect [T88.7XXA]  Unknown     · Hydroxychloroquine and Sulfasalazine      Chronic renal failure syndrome, stage 3 (moderate) [N18.3]  Yes    Essential hypertension [I10]  Yes      Resolved Hospital Problems   No resolved problems to display.          Assessment and Plan for problems addressed today:      amLODIPine  5 mg Oral Daily    aspirin  81 mg Oral Daily    atorvastatin  40 mg Oral Daily    carvedilol  12.5 mg Oral BID WM    clopidogrel  75 mg Oral Daily    heparin (porcine)  5,000 Units Subcutaneous Q8H    hydroxychloroquine  200 mg Oral BID    montelukast  10 mg Oral QHS    mycophenolate  500 mg Oral BID    pantoprazole  40 mg Oral Daily    predniSONE  25 mg Oral Daily     acetaminophen, albuterol, labetalol, ondansetron, polyethylene glycol, sodium chloride 0.9%, sodium chloride 0.9%    Acute toxic/metabolic Encephalopathy:  Improving  -Etiology:  Infectious vs. Neurologic vs. Iatrogenic  -CT head, MRI/MRA brain nonacute.  Cerebral volume loss concerning for chronic ischemic changes  -EEG without any epileptiform activity, but does show slowing consistent with moderate encephalopathy  -Medication additions or changes could be affecting presentation, as patient was given multiple doses of baclofen and gabapentin.  Hold any sedating medications at this time.  -no antiepileptics for now  -consulted Neurology, appreciate recommendations  -aspiration precautions, fall precautions, seizure precautions  -neuro checks  -Continue correction of metabolic  "derangements  -Maintain Delirium precautions: Maintain regular sleep cycle. Early ambulation. Minimal interruptions overnight. Re-orient patient frequently. Maintain adequate bowel regimen, hydration and electrolyte replenishments. Hearing Aids and eye glasses as needed.     Seropositive rheumatoid arthritis of multiple sites  Long-term use of immunosuppressant medication  -On home plaquenil, mycophenolate, and prednisone daily for RA  -follows with Dr. Rouse rheumatology  -hx of  2/2 RA     Essential hypertension  -stable  -Continue PTA  amlodipine, Coreg  -monitor vitals q4h  -SBP goal of <160 in hospital    Dystonia  -Follows with Dr. Giang of Neurology  -Hold Gabapentin and Baclofen  for now given AMS     History of stroke  -Chronic and stable  -Continue Aspirin 81mg PO daily  -Continue Plavix 75mg PO daily    GERD:  -continue PPI    Hyperlipidemia:  -continue PTA atorvastatin 40 mg      Pt's hospitalization is medically necessary due to acute encephalopathy from concerns for seizures with superimposed toxic encephalopathy from diazepam, baclofen, gabapentin.  Patient was noted to be somnolent, and have worsening debility, muscle weakness.  Per neurology "pt's presentation is concerning for ENCP.  DIfDx includes new radiographically negative CVA, occult infectious/metabolic derangement, iatrogenic(polypharmacy:  Diazepam, baclofen) and potential epileptic activity"    DVT PPx:  Heparin    Discharge plan and follow up: DC to SNF medically stable     Corie Singer MD  Hospital Medicine Staff  [phone] pager        "

## 2019-08-31 NOTE — PROGRESS NOTES
8/31 fall, no head trauma, improved MS    84 yo AAF w/ complicated neurological history including cervical dystonia and vascular dementia, neuropathy as well as HTN, RA (on mycophenolate, steroid) and previous episodes of AMS who presents with an acute change in AMS.  History is notable for for acute onset of confusion with negative workup for CVA.   Exam is notable for afebrile, initially hypertensive but now normotensive elderly AAF who is awake and oriented to self, follows simple and complex commands, dysarthric, GUEVARA grossly equally.  Workup is notable  For EEG with generalized slowing but no epileptic activity, MRI Brain with mod/severe microvascular disease but no acute finding, MRA shows incidental small aneurysm but no other vascular pathology.  Labwork demonstrates mild HypoNa (134), WNL UA, unermarkable CXR  Pt's presentation is concerning for ENCP that has improved with patient at or near baseline..  DIfDx includes new radiographically negative CVA, occult infectious/metabolic derangement, iatrogenic(polypharmacy:  Diazepam, baclofen) and potential epileptic activity.    Sleep/wake  Seizure precautions  Min BDZ  Continue secondary CVA prevention: ASA+Plavix, atorvastatin 80mg daily, carvedilol/amlodipine        Gilda Sosa MD  Neurologist  [facility]

## 2019-08-31 NOTE — NURSING
[place] pt call out.upon entering room,pt on her knees at the bedside facing her bed with the top portion of her body/arms leaning on the bed.pt still attached to her scds.reports she was trying to get to the br.denies hitting head.assisted pt up with a gait belt and pt wished to proceed to ambulate to the br and voided.assisted back to bed.no visible injuries noted to.Noemy LEES notified.Jeri Schaeffer notified.pt again instructed to call for assistance,bed in low position,call bell in reach.rails up x3.bed alarm activated for pt safety.monitoring closely.

## 2019-09-01 LAB
ANION GAP SERPL CALC-SCNC: 9 MMOL/L (ref 8–16)
BASOPHILS # BLD AUTO: 0.02 K/UL (ref 0–0.2)
BASOPHILS NFR BLD: 0.2 % (ref 0–1.9)
BUN SERPL-MCNC: 20 MG/DL (ref 8–23)
CALCIUM SERPL-MCNC: 9.4 MG/DL (ref 8.7–10.5)
CHLORIDE SERPL-SCNC: 103 MMOL/L (ref 95–110)
CO2 SERPL-SCNC: 25 MMOL/L (ref 23–29)
CREAT SERPL-MCNC: 1.1 MG/DL (ref 0.5–1.4)
DIFFERENTIAL METHOD: ABNORMAL
EOSINOPHIL # BLD AUTO: 0.1 K/UL (ref 0–0.5)
EOSINOPHIL NFR BLD: 1.4 % (ref 0–8)
ERYTHROCYTE [DISTWIDTH] IN BLOOD BY AUTOMATED COUNT: 13.7 % (ref 11.5–14.5)
EST. GFR  (AFRICAN AMERICAN): 54.4 ML/MIN/1.73 M^2
EST. GFR  (NON AFRICAN AMERICAN): 47.2 ML/MIN/1.73 M^2
GLUCOSE SERPL-MCNC: 90 MG/DL (ref 70–110)
HCT VFR BLD AUTO: 39.3 % (ref 37–48.5)
HGB BLD-MCNC: 12.1 G/DL (ref 12–16)
IMM GRANULOCYTES # BLD AUTO: 0.04 K/UL (ref 0–0.04)
IMM GRANULOCYTES NFR BLD AUTO: 0.5 % (ref 0–0.5)
LYMPHOCYTES # BLD AUTO: 1.8 K/UL (ref 1–4.8)
LYMPHOCYTES NFR BLD: 21 % (ref 18–48)
MAGNESIUM SERPL-MCNC: 1.9 MG/DL (ref 1.6–2.6)
MCH RBC QN AUTO: 27.3 PG (ref 27–31)
MCHC RBC AUTO-ENTMCNC: 30.8 G/DL (ref 32–36)
MCV RBC AUTO: 89 FL (ref 82–98)
MONOCYTES # BLD AUTO: 1.6 K/UL (ref 0.3–1)
MONOCYTES NFR BLD: 18.1 % (ref 4–15)
NEUTROPHILS # BLD AUTO: 5.1 K/UL (ref 1.8–7.7)
NEUTROPHILS NFR BLD: 58.8 % (ref 38–73)
NRBC BLD-RTO: 0 /100 WBC
PHOSPHATE SERPL-MCNC: 2.3 MG/DL (ref 2.7–4.5)
PLATELET # BLD AUTO: 210 K/UL (ref 150–350)
PMV BLD AUTO: 9.7 FL (ref 9.2–12.9)
POTASSIUM SERPL-SCNC: 3.9 MMOL/L (ref 3.5–5.1)
RBC # BLD AUTO: 4.44 M/UL (ref 4–5.4)
SODIUM SERPL-SCNC: 137 MMOL/L (ref 136–145)
WBC # BLD AUTO: 8.63 K/UL (ref 3.9–12.7)

## 2019-09-01 PROCEDURE — 25000003 PHARM REV CODE 250: Performed by: NURSE PRACTITIONER

## 2019-09-01 PROCEDURE — 99232 SBSQ HOSP IP/OBS MODERATE 35: CPT | Mod: ,,, | Performed by: HOSPITALIST

## 2019-09-01 PROCEDURE — 99232 PR SUBSEQUENT HOSPITAL CARE,LEVL II: ICD-10-PCS | Mod: ,,, | Performed by: HOSPITALIST

## 2019-09-01 PROCEDURE — 63600175 PHARM REV CODE 636 W HCPCS: Performed by: HOSPITALIST

## 2019-09-01 PROCEDURE — 11000001 HC ACUTE MED/SURG PRIVATE ROOM

## 2019-09-01 PROCEDURE — 97535 SELF CARE MNGMENT TRAINING: CPT

## 2019-09-01 PROCEDURE — 36415 COLL VENOUS BLD VENIPUNCTURE: CPT

## 2019-09-01 PROCEDURE — 85025 COMPLETE CBC W/AUTO DIFF WBC: CPT

## 2019-09-01 PROCEDURE — 25000003 PHARM REV CODE 250: Performed by: HOSPITALIST

## 2019-09-01 PROCEDURE — 83735 ASSAY OF MAGNESIUM: CPT

## 2019-09-01 PROCEDURE — 63600175 PHARM REV CODE 636 W HCPCS: Performed by: NURSE PRACTITIONER

## 2019-09-01 PROCEDURE — 97530 THERAPEUTIC ACTIVITIES: CPT

## 2019-09-01 PROCEDURE — 84100 ASSAY OF PHOSPHORUS: CPT

## 2019-09-01 PROCEDURE — 80048 BASIC METABOLIC PNL TOTAL CA: CPT

## 2019-09-01 RX ORDER — AMLODIPINE BESYLATE 10 MG/1
10 TABLET ORAL DAILY
Status: DISCONTINUED | OUTPATIENT
Start: 2019-09-02 | End: 2019-09-05 | Stop reason: HOSPADM

## 2019-09-01 RX ORDER — AMLODIPINE BESYLATE 5 MG/1
5 TABLET ORAL ONCE
Status: COMPLETED | OUTPATIENT
Start: 2019-09-01 | End: 2019-09-01

## 2019-09-01 RX ORDER — HYDROXYZINE HYDROCHLORIDE 25 MG/1
25 TABLET, FILM COATED ORAL ONCE
Status: COMPLETED | OUTPATIENT
Start: 2019-09-01 | End: 2019-09-01

## 2019-09-01 RX ORDER — SODIUM,POTASSIUM PHOSPHATES 280-250MG
1 POWDER IN PACKET (EA) ORAL ONCE
Status: COMPLETED | OUTPATIENT
Start: 2019-09-01 | End: 2019-09-01

## 2019-09-01 RX ORDER — HYDROXYZINE HYDROCHLORIDE 25 MG/1
25 TABLET, FILM COATED ORAL EVERY 6 HOURS PRN
Status: DISCONTINUED | OUTPATIENT
Start: 2019-09-01 | End: 2019-09-05 | Stop reason: HOSPADM

## 2019-09-01 RX ADMIN — MYCOPHENOLATE MOFETIL 500 MG: 250 CAPSULE ORAL at 08:09

## 2019-09-01 RX ADMIN — CARVEDILOL 12.5 MG: 12.5 TABLET, FILM COATED ORAL at 08:09

## 2019-09-01 RX ADMIN — POTASSIUM & SODIUM PHOSPHATES POWDER PACK 280-160-250 MG 1 PACKET: 280-160-250 PACK at 11:09

## 2019-09-01 RX ADMIN — ASPIRIN 81 MG: 81 TABLET, COATED ORAL at 09:09

## 2019-09-01 RX ADMIN — PANTOPRAZOLE SODIUM 40 MG: 40 TABLET, DELAYED RELEASE ORAL at 08:09

## 2019-09-01 RX ADMIN — HYDROXYZINE HYDROCHLORIDE 25 MG: 25 TABLET, FILM COATED ORAL at 09:09

## 2019-09-01 RX ADMIN — HEPARIN SODIUM 5000 UNITS: 5000 INJECTION INTRAVENOUS; SUBCUTANEOUS at 01:09

## 2019-09-01 RX ADMIN — ATORVASTATIN CALCIUM 40 MG: 10 TABLET, FILM COATED ORAL at 08:09

## 2019-09-01 RX ADMIN — PREDNISONE 25 MG: 5 TABLET ORAL at 08:09

## 2019-09-01 RX ADMIN — MONTELUKAST 10 MG: 10 TABLET, FILM COATED ORAL at 09:09

## 2019-09-01 RX ADMIN — HYDROXYCHLOROQUINE SULFATE 200 MG: 200 TABLET, FILM COATED ORAL at 09:09

## 2019-09-01 RX ADMIN — MYCOPHENOLATE MOFETIL 500 MG: 250 CAPSULE ORAL at 09:09

## 2019-09-01 RX ADMIN — HYDROXYCHLOROQUINE SULFATE 200 MG: 200 TABLET, FILM COATED ORAL at 08:09

## 2019-09-01 RX ADMIN — AMLODIPINE BESYLATE 5 MG: 5 TABLET ORAL at 11:09

## 2019-09-01 RX ADMIN — HEPARIN SODIUM 5000 UNITS: 5000 INJECTION INTRAVENOUS; SUBCUTANEOUS at 09:09

## 2019-09-01 RX ADMIN — HYDROXYZINE HYDROCHLORIDE 25 MG: 25 TABLET, FILM COATED ORAL at 02:09

## 2019-09-01 RX ADMIN — HEPARIN SODIUM 5000 UNITS: 5000 INJECTION INTRAVENOUS; SUBCUTANEOUS at 06:09

## 2019-09-01 RX ADMIN — CARVEDILOL 12.5 MG: 12.5 TABLET, FILM COATED ORAL at 04:09

## 2019-09-01 RX ADMIN — CLOPIDOGREL BISULFATE 75 MG: 75 TABLET, FILM COATED ORAL at 09:09

## 2019-09-01 RX ADMIN — AMLODIPINE BESYLATE 5 MG: 5 TABLET ORAL at 08:09

## 2019-09-01 NOTE — PLAN OF CARE
Problem: Occupational Therapy Goal  Goal: Occupational Therapy Goal  Goals to be met by: 7 days (9/2/19)     Patient will increase functional independence with ADLs by performing:    UE Dressing with Supervision.  LE Dressing with Stand-by Assistance.  Grooming while standing at sink with Stand-by Assistance.  Toileting from toilet with Stand-by Assistance for hygiene and clothing management.   Supine to sit with Stand-by Assistance.  Toilet transfer to toilet with Stand-by Assistance.  Complete functional mobility household distance with SBA using AD as needed.     Outcome: Ongoing (interventions implemented as appropriate)  Pt progressing well towards standing grooming goals. Continue OT POC.  Catalina Snyder OT  9/1/2019

## 2019-09-01 NOTE — PT/OT/SLP PROGRESS
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  She presents with impaired ADL and functional mobility performance. Pt lethargic upon arrival however able to participate in bed mobility, sit>stand t/f, grooming at sink, and safe return to pt [facility] for breakfast. Pt demo CGA with gait belt usage and RW throughout session with pt tolerating standing ~5 min at sink. POC discussed with daughter who explained family training not needed as "we've been through this before" in relation to therapy progression. Pt still requiring frequent cues for safety with following directions and walker management.  Performance deficits affecting function are weakness, impaired endurance, impaired self care skills, impaired functional mobilty, gait instability, impaired balance, impaired cognition, decreased coordination, decreased lower extremity function, decreased safety awareness. Pt would benefit from continued OT skilled services 3x/wk to improve daily living skills to optimize QOL. Pt is recommended to discharge to SNF  at this time.      Rehab Prognosis:  Good; patient would benefit from acute skilled OT services to address these deficits and reach maximum level of function.       Plan:     Patient to be seen 3 x/week to address the above listed problems via self-care/home management, therapeutic activities, therapeutic exercises  · Plan of Care Expires: 09/27/19  · Plan of Care Reviewed with: patient, daughter    Subjective     Pain/Comfort:       Objective:     Communicated with: RN prior to session.  Patient found HOB elevated with peripheral IV, telemetry, bed " alarm upon OT entry to room.    General Precautions: Standard, aspiration, fall   Orthopedic Precautions:N/A   Braces:       Occupational Performance:     Bed Mobility:    · Patient completed Rolling/Turning to Right with contact guard assistance  · Patient completed Scooting/Bridging with contact guard assistance  · Patient completed Supine to Sit with contact guard assistance     Functional Mobility/Transfers:  · Patient completed Sit <> Stand Transfer with contact guard assistance  with  rolling walker   · Patient completed Bed <> Chair Transfer using Step Transfer technique with contact guard assistance with rolling walker  · Functional Mobility: Pt completed bedroom mobility using RW and gait belt for safety. Pt with no LOB however needed cueing for environmental scanning.    Activities of Daily Living:  · Feeding:  setup with breakfast tray while pt [facility]  · Grooming: setup with pt standing at sink for face washing, teeth brushing.   · Upper Body Dressing: contact guard assistance tying gown behind back for coverage at EOB.  · Lower Body Dressing: adjusting socks with standby assistance      AMPAC 6 Click ADL:      Treatment & Education:  Pt educated on role of occupational therapy, POC, and safety during ADLs and functional mobility. Pt and OT discussed importance of safe, continued mobility to optimize daily living skills. Pt verbalized understanding.   Pt completed the following during session: bed mobility, sit>stand t/f, bedroom ambulation, standing grooming tasks, mobility to bedside chair for breakfast setup. White board updated during session. Pt given instruction to call for medical staff/nurse for assistance.       Patient left up in chair with all lines intact, call button in reach and daughter presentEducation:      GOALS:   Multidisciplinary Problems     Occupational Therapy Goals        Problem: Occupational Therapy Goal    Goal Priority Disciplines Outcome Interventions   Occupational Therapy Goal      OT, PT/OT Ongoing (interventions implemented as appropriate)    Description:  Goals to be met by: 7 days (9/2/19)     Patient will increase functional independence with ADLs by performing:    UE Dressing with Supervision.  LE Dressing with Stand-by Assistance.  Grooming while standing at sink with Stand-by Assistance.  Toileting from toilet with Stand-by Assistance for hygiene and clothing management.   Supine to sit with Stand-by Assistance.  Toilet transfer to toilet with Stand-by Assistance.  Complete functional mobility household distance with SBA using AD as needed.                      Time Tracking:     OT Date of Treatment: 09/01/19  OT Start Time: 0822  OT Stop Time: 0847  OT Total Time (min): 25 min    Billable Minutes:Self Care/Home Management 15 min  Therapeutic Activity 10 min    Catalina Snyder OT  9/3/2019

## 2019-09-01 NOTE — NURSING
No issues today, family and sitter remain @ bedside. VSS - refused VS x 1. Tele in place, NSR. Tolerating diet, ambulated around unit w/ standby assist & RW. Worked with PT, sat up in chair for much of day. Safety maintained. See flowsheet for detail.     9/1/2019 4:20 PM

## 2019-09-01 NOTE — NURSING
Pt IV came out, catheter intact, pt stable. Pt refused IV.  Will attempt to try and start another IV.

## 2019-09-01 NOTE — PROGRESS NOTES
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  She noted that she had a doctor's appointment earlier today and appeared normal, went home, ate a normal dinner, but then approximately 2 hr later at about 9:00 p.m. the patient shouted for help and her daughter found her in the hallway hunched over her walker.  She states that she appeared very weak, and when she sat her down on the toilet to rest the patient was unable to finish a sentence because of confusion, and then began to have slurred speech.  This whole episode lasted approximately 30 min, and while debating coming to the ED a resolved.  The daughter notes a previous episode similar to this or in December 2018, she was admitted with encephalopathy, there is concern about hypertensive emergency versus viral meningitis as a cause.  Patient has a long complicated medical history. She sees Dr Giang for cervical dystonia and Botox injections and Dr Tobias for memory loss. Upon examination but still slightly confused but moving all extremities.  Patient was initially admitted to the vascular Neurology service to rule out TIA.  MRI/MRA were negative for acute ischemia.  Patient underwent EEG which did not show epileptiform abnormalities.  However according to vascular Neurology, due to patient's prior episode with similar symptoms in December 2018, there is a concern for possible seizures.  Patient also has superimposed toxic encephalopathy from baclofen and gabapentin overuse.  Neurology consulted    Interval hx:   Pt was seen and examined at bedside.  Patient remained afebrile.  Noted to be more hypertensive  overnight and this morning  This morning patient is awake, alert, oriented x4.  Patient has no acute complaints today    ROS (Positive in Bold, otherwise negative)  Constitutional: fever, chills, night sweats  CV: chest pain, edema, palpitations  Resp: SOB, cough, sputum production  GI: changes in appetite, NVDC, pain, melena, hematochezia, GERD, hematemesis  : Dysuria, hematuria, urinary urgency, frequency  MSK: arthralgia/myalgia, joint swelling  Neuro/Psych: anxiety, depression    PEx   Temp:  [97.8 °F (36.6 °C)-98.6 °F (37 °C)]   Pulse:  [74-94]   Resp:  [18-20]   BP: (123-166)/(58-95)   SpO2:  [95 %-97 %]      I & O (Last 24H):   No intake or output data in the 24 hours ending 09/01/19 1032    General:  female  in no acute distress. Sitting up in bed.  Cooperative.  HEENT: NCAT. PERRL. EOMI. Sclera Anicteric.  CVS: RRR. Normal S1 S2. No murmurs  Pulm: CTAB. Normal respiratory effort. No wheezes, rhonchi, or crackles.  Abdomen: Soft. Non-distended. No tenderness to palpation. No rebound or guarding. +BS.  Extremities: No edema. No cyanosis. Full ROM.  Neuro: Alert, oriented x 4, Spont mvt of all extremities with no focal deficits noted.    Recent Results (from the past 24 hour(s))   Basic metabolic panel    Collection Time: 09/01/19  4:33 AM   Result Value Ref Range    Sodium 137 136 - 145 mmol/L    Potassium 3.9 3.5 - 5.1 mmol/L    Chloride 103 95 - 110 mmol/L    CO2 25 23 - 29 mmol/L    Glucose 90 70 - 110 mg/dL    BUN, Bld 20 8 - 23 mg/dL    Creatinine 1.1 0.5 - 1.4 mg/dL    Calcium 9.4 8.7 - 10.5 mg/dL    Anion Gap 9 8 - 16 mmol/L    eGFR if African American 54.4 (A) >60 mL/min/1.73 m^2    eGFR if non  47.2 (A) >60 mL/min/1.73 m^2   CBC auto differential    Collection Time: 09/01/19  4:33 AM   Result Value Ref Range    WBC 8.63 3.90 - 12.70 K/uL    RBC 4.44 4.00 - 5.40 M/uL    Hemoglobin 12.1 12.0 - 16.0 g/dL    Hematocrit 39.3 37.0 - 48.5 %    Mean Corpuscular Volume 89 82  - 98 fL    Mean Corpuscular Hemoglobin 27.3 27.0 - 31.0 pg    Mean Corpuscular Hemoglobin Conc 30.8 (L) 32.0 - 36.0 g/dL    RDW 13.7 11.5 - 14.5 %    Platelets 210 150 - 350 K/uL    MPV 9.7 9.2 - 12.9 fL    Immature Granulocytes 0.5 0.0 - 0.5 %    Gran # (ANC) 5.1 1.8 - 7.7 K/uL    Immature Grans (Abs) 0.04 0.00 - 0.04 K/uL    Lymph # 1.8 1.0 - 4.8 K/uL    Mono # 1.6 (H) 0.3 - 1.0 K/uL    Eos # 0.1 0.0 - 0.5 K/uL    Baso # 0.02 0.00 - 0.20 K/uL    nRBC 0 0 /100 WBC    Gran% 58.8 38.0 - 73.0 %    Lymph% 21.0 18.0 - 48.0 %    Mono% 18.1 (H) 4.0 - 15.0 %    Eosinophil% 1.4 0.0 - 8.0 %    Basophil% 0.2 0.0 - 1.9 %    Differential Method Automated    Magnesium    Collection Time: 09/01/19  4:33 AM   Result Value Ref Range    Magnesium 1.9 1.6 - 2.6 mg/dL   Phosphorus    Collection Time: 09/01/19  4:33 AM   Result Value Ref Range    Phosphorus 2.3 (L) 2.7 - 4.5 mg/dL       No results for input(s): POCTGLUCOSE in the last 168 hours.    Hemoglobin A1C   Date Value Ref Range Status   08/27/2019 5.6 4.0 - 5.6 % Final     Comment:     ADA Screening Guidelines:  5.7-6.4%  Consistent with prediabetes  >or=6.5%  Consistent with diabetes  High levels of fetal hemoglobin interfere with the HbA1C  assay. Heterozygous hemoglobin variants (HbS, HgC, etc)do  not significantly interfere with this assay.   However, presence of multiple variants may affect accuracy.     03/13/2019 5.7 (H) 4.0 - 5.6 % Final     Comment:     ADA Screening Guidelines:  5.7-6.4%  Consistent with prediabetes  >or=6.5%  Consistent with diabetes  High levels of fetal hemoglobin interfere with the HbA1C  assay. Heterozygous hemoglobin variants (HbS, HgC, etc)do  not significantly interfere with this assay.   However, presence of multiple variants may affect accuracy.     08/24/2017 5.5 4.0 - 5.6 % Final     Comment:     According to ADA guidelines, hemoglobin A1c <7.0% represents  optimal control in non-pregnant diabetic patients. Different  metrics may apply to  specific patient populations.   Standards of Medical Care in Diabetes-2016.  For the purpose of screening for the presence of diabetes:  <5.7%     Consistent with the absence of diabetes  5.7-6.4%  Consistent with increasing risk for diabetes   (prediabetes)  >or=6.5%  Consistent with diabetes  Currently, no consensus exists for use of hemoglobin A1c  for diagnosis of diabetes for children.  This Hemoglobin A1c assay has significant interference with fetal   hemoglobin   (HbF). The results are invalid for patients with abnormal amounts of   HbF,   including those with known Hereditary Persistence   of Fetal Hemoglobin. Heterozygous hemoglobin variants (HbAS, HbAC,   HbAD, HbAE, HbA2) do not significantly interfere with this assay;   however, presence of multiple variants in a sample may impact the %   interference.          Active Hospital Problems    Diagnosis  POA    *Acute encephalopathy [G93.40]  Yes    Dyspnea [R06.00]  Yes    TIA (transient ischemic attack) [G45.9]  Unknown    Cryptogenic organizing pneumonia [J84.116]  Yes     Clinically suspected due to recurring infiltrates on CXR in the setting of rheumatoid arthritis.  Work up for infection has been negative and the clinical picture is less consistent with acute infection.  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    · Slowly wean prednisone as tolerated.  · Now on sulfasalazine as per Dr. Rouse (Rheumatology).  It is probably reasonable to avoid biologics unless absolutely necessary given prior flare after infliximab.  · Repeat CXR from 12/25/2018 shows continued clearing.  · Consider repeat chest CT (non-contrast) once prednisone is at stable lowest dose possible.      Altered mental status [R41.82]  Yes    Medication side effect [T88.7XXA]  Unknown     · Hydroxychloroquine and Sulfasalazine      Chronic  renal failure syndrome, stage 3 (moderate) [N18.3]  Yes    Essential hypertension [I10]  Yes      Resolved Hospital Problems   No resolved problems to display.          Assessment and Plan for problems addressed today:      [START ON 9/2/2019] amLODIPine  10 mg Oral Daily    amLODIPine  5 mg Oral Once    aspirin  81 mg Oral Daily    atorvastatin  40 mg Oral Daily    carvedilol  12.5 mg Oral BID WM    clopidogrel  75 mg Oral Daily    heparin (porcine)  5,000 Units Subcutaneous Q8H    hydroxychloroquine  200 mg Oral BID    montelukast  10 mg Oral QHS    mycophenolate  500 mg Oral BID    pantoprazole  40 mg Oral Daily    potassium, sodium phosphates  1 packet Oral Once    predniSONE  25 mg Oral Daily     acetaminophen, albuterol, hydrOXYzine HCl, labetalol, ondansetron, polyethylene glycol, sodium chloride 0.9%, sodium chloride 0.9%    Acute toxic/metabolic Encephalopathy:  Improving  -Etiology:  Infectious vs. Neurologic vs. Iatrogenic  -CT head, MRI/MRA brain nonacute.  Cerebral volume loss concerning for chronic ischemic changes  -EEG without any epileptiform activity, but does show slowing consistent with moderate encephalopathy  -Medication additions or changes could be affecting presentation, as patient was given multiple doses of baclofen and gabapentin.  Hold any sedating medications at this time.  -no antiepileptics for now  -consulted Neurology, appreciate recommendations  -aspiration precautions, fall precautions, seizure precautions  -neuro checks  -Continue correction of metabolic derangements  -Maintain Delirium precautions: Maintain regular sleep cycle. Early ambulation. Minimal interruptions overnight. Re-orient patient frequently. Maintain adequate bowel regimen, hydration and electrolyte replenishments. Hearing Aids and eye glasses as needed.     Seropositive rheumatoid arthritis of multiple sites  Long-term use of immunosuppressant medication  -On home plaquenil, mycophenolate, and  "prednisone daily for RA  -follows with Dr. Rouse rheumatology  -hx of  2/2 RA     Essential hypertension  -Continue PTA Coreg  -increase amlodipine to 10 mg  -monitor vitals q4h  -SBP goal of <160 in hospital    Dystonia  -Follows with Dr. Giang of Neurology  -Hold Gabapentin and Baclofen  for now given AMS     History of stroke  -Chronic and stable  -Continue Aspirin 81mg PO daily  -Continue Plavix 75mg PO daily    GERD:  -continue PPI    Hyperlipidemia:  -continue PTA atorvastatin 40 mg      Pt's hospitalization is medically necessary due to acute encephalopathy from concerns for seizures with superimposed toxic encephalopathy from diazepam, baclofen, gabapentin.  Patient was noted to be somnolent, and have worsening debility, muscle weakness.  Per neurology "pt's presentation is concerning for ENCP.  DIfDx includes new radiographically negative CVA, occult infectious/metabolic derangement, iatrogenic(polypharmacy:  Diazepam, baclofen) and potential epileptic activity"    DVT PPx:  Heparin    Discharge plan and follow up: DC to SNF medically stable     Corie Singer MD  Hospital Medicine Staff  [phone] pager        "

## 2019-09-01 NOTE — PLAN OF CARE
Problem: Adult Inpatient Plan of Care  Goal: Plan of Care Review  Outcome: Ongoing (interventions implemented as appropriate)  Will continue to monitor pt's safety, s/s of infection, assess for wounds and assess skin integrity.

## 2019-09-02 LAB
ANION GAP SERPL CALC-SCNC: 9 MMOL/L (ref 8–16)
BASOPHILS # BLD AUTO: 0.02 K/UL (ref 0–0.2)
BASOPHILS NFR BLD: 0.2 % (ref 0–1.9)
BUN SERPL-MCNC: 23 MG/DL (ref 8–23)
CALCIUM SERPL-MCNC: 9 MG/DL (ref 8.7–10.5)
CHLORIDE SERPL-SCNC: 104 MMOL/L (ref 95–110)
CO2 SERPL-SCNC: 24 MMOL/L (ref 23–29)
CREAT SERPL-MCNC: 1 MG/DL (ref 0.5–1.4)
DIFFERENTIAL METHOD: ABNORMAL
EOSINOPHIL # BLD AUTO: 0.1 K/UL (ref 0–0.5)
EOSINOPHIL NFR BLD: 0.9 % (ref 0–8)
ERYTHROCYTE [DISTWIDTH] IN BLOOD BY AUTOMATED COUNT: 13.7 % (ref 11.5–14.5)
EST. GFR  (AFRICAN AMERICAN): >60 ML/MIN/1.73 M^2
EST. GFR  (NON AFRICAN AMERICAN): 53 ML/MIN/1.73 M^2
GLUCOSE SERPL-MCNC: 85 MG/DL (ref 70–110)
HCT VFR BLD AUTO: 35.9 % (ref 37–48.5)
HGB BLD-MCNC: 11.2 G/DL (ref 12–16)
IMM GRANULOCYTES # BLD AUTO: 0.04 K/UL (ref 0–0.04)
IMM GRANULOCYTES NFR BLD AUTO: 0.4 % (ref 0–0.5)
LYMPHOCYTES # BLD AUTO: 2.4 K/UL (ref 1–4.8)
LYMPHOCYTES NFR BLD: 26.3 % (ref 18–48)
MAGNESIUM SERPL-MCNC: 1.9 MG/DL (ref 1.6–2.6)
MCH RBC QN AUTO: 27.4 PG (ref 27–31)
MCHC RBC AUTO-ENTMCNC: 31.2 G/DL (ref 32–36)
MCV RBC AUTO: 88 FL (ref 82–98)
MONOCYTES # BLD AUTO: 1.4 K/UL (ref 0.3–1)
MONOCYTES NFR BLD: 15 % (ref 4–15)
NEUTROPHILS # BLD AUTO: 5.1 K/UL (ref 1.8–7.7)
NEUTROPHILS NFR BLD: 57.2 % (ref 38–73)
NRBC BLD-RTO: 0 /100 WBC
PHOSPHATE SERPL-MCNC: 3 MG/DL (ref 2.7–4.5)
PLATELET # BLD AUTO: 214 K/UL (ref 150–350)
PMV BLD AUTO: 9.5 FL (ref 9.2–12.9)
POTASSIUM SERPL-SCNC: 4.1 MMOL/L (ref 3.5–5.1)
RBC # BLD AUTO: 4.09 M/UL (ref 4–5.4)
SODIUM SERPL-SCNC: 137 MMOL/L (ref 136–145)
WBC # BLD AUTO: 8.98 K/UL (ref 3.9–12.7)

## 2019-09-02 PROCEDURE — 99232 PR SUBSEQUENT HOSPITAL CARE,LEVL II: ICD-10-PCS | Mod: ,,, | Performed by: HOSPITALIST

## 2019-09-02 PROCEDURE — 83735 ASSAY OF MAGNESIUM: CPT

## 2019-09-02 PROCEDURE — 11000001 HC ACUTE MED/SURG PRIVATE ROOM

## 2019-09-02 PROCEDURE — 80048 BASIC METABOLIC PNL TOTAL CA: CPT

## 2019-09-02 PROCEDURE — 99232 SBSQ HOSP IP/OBS MODERATE 35: CPT | Mod: ,,, | Performed by: HOSPITALIST

## 2019-09-02 PROCEDURE — 84100 ASSAY OF PHOSPHORUS: CPT

## 2019-09-02 PROCEDURE — 63600175 PHARM REV CODE 636 W HCPCS: Performed by: HOSPITALIST

## 2019-09-02 PROCEDURE — 85025 COMPLETE CBC W/AUTO DIFF WBC: CPT

## 2019-09-02 PROCEDURE — 25000003 PHARM REV CODE 250: Performed by: NURSE PRACTITIONER

## 2019-09-02 PROCEDURE — 36415 COLL VENOUS BLD VENIPUNCTURE: CPT

## 2019-09-02 PROCEDURE — 63600175 PHARM REV CODE 636 W HCPCS: Performed by: NURSE PRACTITIONER

## 2019-09-02 PROCEDURE — 25000003 PHARM REV CODE 250: Performed by: HOSPITALIST

## 2019-09-02 RX ADMIN — PREDNISONE 25 MG: 5 TABLET ORAL at 08:09

## 2019-09-02 RX ADMIN — HEPARIN SODIUM 5000 UNITS: 5000 INJECTION INTRAVENOUS; SUBCUTANEOUS at 05:09

## 2019-09-02 RX ADMIN — CLOPIDOGREL BISULFATE 75 MG: 75 TABLET, FILM COATED ORAL at 08:09

## 2019-09-02 RX ADMIN — CARVEDILOL 12.5 MG: 12.5 TABLET, FILM COATED ORAL at 08:09

## 2019-09-02 RX ADMIN — CARVEDILOL 12.5 MG: 12.5 TABLET, FILM COATED ORAL at 06:09

## 2019-09-02 RX ADMIN — HEPARIN SODIUM 5000 UNITS: 5000 INJECTION INTRAVENOUS; SUBCUTANEOUS at 03:09

## 2019-09-02 RX ADMIN — ASPIRIN 81 MG: 81 TABLET, COATED ORAL at 08:09

## 2019-09-02 RX ADMIN — ATORVASTATIN CALCIUM 40 MG: 10 TABLET, FILM COATED ORAL at 08:09

## 2019-09-02 RX ADMIN — MONTELUKAST 10 MG: 10 TABLET, FILM COATED ORAL at 09:09

## 2019-09-02 RX ADMIN — HYDROXYCHLOROQUINE SULFATE 200 MG: 200 TABLET, FILM COATED ORAL at 08:09

## 2019-09-02 RX ADMIN — HYDROXYCHLOROQUINE SULFATE 200 MG: 200 TABLET, FILM COATED ORAL at 09:09

## 2019-09-02 RX ADMIN — MYCOPHENOLATE MOFETIL 500 MG: 250 CAPSULE ORAL at 08:09

## 2019-09-02 RX ADMIN — AMLODIPINE BESYLATE 10 MG: 10 TABLET ORAL at 08:09

## 2019-09-02 RX ADMIN — MYCOPHENOLATE MOFETIL 500 MG: 250 CAPSULE ORAL at 09:09

## 2019-09-02 RX ADMIN — PANTOPRAZOLE SODIUM 40 MG: 40 TABLET, DELAYED RELEASE ORAL at 08:09

## 2019-09-02 NOTE — PROGRESS NOTES
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  She noted that she had a doctor's appointment earlier today and appeared normal, went home, ate a normal dinner, but then approximately 2 hr later at about 9:00 p.m. the patient shouted for help and her daughter found her in the hallway hunched over her walker.  She states that she appeared very weak, and when she sat her down on the toilet to rest the patient was unable to finish a sentence because of confusion, and then began to have slurred speech.  This whole episode lasted approximately 30 min, and while debating coming to the ED a resolved.  The daughter notes a previous episode similar to this or in December 2018, she was admitted with encephalopathy, there is concern about hypertensive emergency versus viral meningitis as a cause.  Patient has a long complicated medical history. She sees Dr Giang for cervical dystonia and Botox injections and Dr Tobias for memory loss. Upon examination but still slightly confused but moving all extremities.  Patient was initially admitted to the vascular Neurology service to rule out TIA.  MRI/MRA were negative for acute ischemia.  Patient underwent EEG which did not show epileptiform abnormalities.  However according to vascular Neurology, due to patient's prior episode with similar symptoms in December 2018, there is a concern for possible seizures.  Patient also has superimposed toxic encephalopathy from baclofen and gabapentin overuse.  Neurology consulted    Interval hx:   Pt was seen and examined at bedside.  Patient remained afebrile and hemodynamically stable  overnight.  This morning patient is awake, alert, oriented x4.  Patient was resting in bed, in a good mood today.  No acute complaints.    ROS (Positive in Bold, otherwise negative)  Constitutional: fever, chills, night sweats  CV: chest pain, edema, palpitations  Resp: SOB, cough, sputum production  GI: changes in appetite, NVDC, pain, melena, hematochezia, GERD, hematemesis  : Dysuria, hematuria, urinary urgency, frequency  MSK: arthralgia/myalgia, joint swelling  Neuro/Psych: anxiety, depression    PEx   Temp:  [97.3 °F (36.3 °C)-98.4 °F (36.9 °C)]   Pulse:  [75-83]   Resp:  [16-20]   BP: (120-174)/(60-83)   SpO2:  [96 %-99 %]      I & O (Last 24H):     Intake/Output Summary (Last 24 hours) at 9/2/2019 1357  Last data filed at 9/1/2019 1745  Gross per 24 hour   Intake 120 ml   Output --   Net 120 ml       General:  female  in no acute distress. Sitting up in bed.  Cooperative.  HEENT: NCAT. PERRL. EOMI. Sclera Anicteric.  CVS: RRR. Normal S1 S2. No murmurs  Pulm: CTAB. Normal respiratory effort. No wheezes, rhonchi, or crackles.  Abdomen: Soft. Non-distended. No tenderness to palpation. No rebound or guarding. +BS.  Extremities: No edema. No cyanosis. Full ROM.  Neuro: Alert, oriented x 4, Spont mvt of all extremities with no focal deficits noted.    Recent Results (from the past 24 hour(s))   Basic metabolic panel    Collection Time: 09/02/19  3:40 AM   Result Value Ref Range    Sodium 137 136 - 145 mmol/L    Potassium 4.1 3.5 - 5.1 mmol/L    Chloride 104 95 - 110 mmol/L    CO2 24 23 - 29 mmol/L    Glucose 85 70 - 110 mg/dL    BUN, Bld 23 8 - 23 mg/dL    Creatinine 1.0 0.5 - 1.4 mg/dL    Calcium 9.0 8.7 - 10.5 mg/dL    Anion Gap 9 8 - 16 mmol/L    eGFR if African American >60.0 >60 mL/min/1.73 m^2    eGFR if non  53.0 (A) >60 mL/min/1.73 m^2   CBC auto differential    Collection Time: 09/02/19  3:40 AM   Result Value Ref Range    WBC 8.98 3.90 - 12.70 K/uL    RBC 4.09 4.00 -  5.40 M/uL    Hemoglobin 11.2 (L) 12.0 - 16.0 g/dL    Hematocrit 35.9 (L) 37.0 - 48.5 %    Mean Corpuscular Volume 88 82 - 98 fL    Mean Corpuscular Hemoglobin 27.4 27.0 - 31.0 pg    Mean Corpuscular Hemoglobin Conc 31.2 (L) 32.0 - 36.0 g/dL    RDW 13.7 11.5 - 14.5 %    Platelets 214 150 - 350 K/uL    MPV 9.5 9.2 - 12.9 fL    Immature Granulocytes 0.4 0.0 - 0.5 %    Gran # (ANC) 5.1 1.8 - 7.7 K/uL    Immature Grans (Abs) 0.04 0.00 - 0.04 K/uL    Lymph # 2.4 1.0 - 4.8 K/uL    Mono # 1.4 (H) 0.3 - 1.0 K/uL    Eos # 0.1 0.0 - 0.5 K/uL    Baso # 0.02 0.00 - 0.20 K/uL    nRBC 0 0 /100 WBC    Gran% 57.2 38.0 - 73.0 %    Lymph% 26.3 18.0 - 48.0 %    Mono% 15.0 4.0 - 15.0 %    Eosinophil% 0.9 0.0 - 8.0 %    Basophil% 0.2 0.0 - 1.9 %    Differential Method Automated    Magnesium    Collection Time: 09/02/19  3:40 AM   Result Value Ref Range    Magnesium 1.9 1.6 - 2.6 mg/dL   Phosphorus    Collection Time: 09/02/19  3:40 AM   Result Value Ref Range    Phosphorus 3.0 2.7 - 4.5 mg/dL       No results for input(s): POCTGLUCOSE in the last 168 hours.    Hemoglobin A1C   Date Value Ref Range Status   08/27/2019 5.6 4.0 - 5.6 % Final     Comment:     ADA Screening Guidelines:  5.7-6.4%  Consistent with prediabetes  >or=6.5%  Consistent with diabetes  High levels of fetal hemoglobin interfere with the HbA1C  assay. Heterozygous hemoglobin variants (HbS, HgC, etc)do  not significantly interfere with this assay.   However, presence of multiple variants may affect accuracy.     03/13/2019 5.7 (H) 4.0 - 5.6 % Final     Comment:     ADA Screening Guidelines:  5.7-6.4%  Consistent with prediabetes  >or=6.5%  Consistent with diabetes  High levels of fetal hemoglobin interfere with the HbA1C  assay. Heterozygous hemoglobin variants (HbS, HgC, etc)do  not significantly interfere with this assay.   However, presence of multiple variants may affect accuracy.     08/24/2017 5.5 4.0 - 5.6 % Final     Comment:     According to ADA guidelines,  hemoglobin A1c <7.0% represents  optimal control in non-pregnant diabetic patients. Different  metrics may apply to specific patient populations.   Standards of Medical Care in Diabetes-2016.  For the purpose of screening for the presence of diabetes:  <5.7%     Consistent with the absence of diabetes  5.7-6.4%  Consistent with increasing risk for diabetes   (prediabetes)  >or=6.5%  Consistent with diabetes  Currently, no consensus exists for use of hemoglobin A1c  for diagnosis of diabetes for children.  This Hemoglobin A1c assay has significant interference with fetal   hemoglobin   (HbF). The results are invalid for patients with abnormal amounts of   HbF,   including those with known Hereditary Persistence   of Fetal Hemoglobin. Heterozygous hemoglobin variants (HbAS, HbAC,   HbAD, HbAE, HbA2) do not significantly interfere with this assay;   however, presence of multiple variants in a sample may impact the %   interference.          Active Hospital Problems    Diagnosis  POA    *Acute encephalopathy [G93.40]  Yes    Dyspnea [R06.00]  Yes    TIA (transient ischemic attack) [G45.9]  Unknown    Cryptogenic organizing pneumonia [J84.116]  Yes     Clinically suspected due to recurring infiltrates on CXR in the setting of rheumatoid arthritis.  Work up for infection has been negative and the clinical picture is less consistent with acute infection.  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    · Slowly wean prednisone as tolerated.  · Now on sulfasalazine as per Dr. Rouse (Rheumatology).  It is probably reasonable to avoid biologics unless absolutely necessary given prior flare after infliximab.  · Repeat CXR from 12/25/2018 shows continued clearing.  · Consider repeat chest CT (non-contrast) once prednisone is at stable lowest dose possible.      Altered mental status  [R41.82]  Yes    Medication side effect [T88.7XXA]  Unknown     · Hydroxychloroquine and Sulfasalazine      Chronic renal failure syndrome, stage 3 (moderate) [N18.3]  Yes    Essential hypertension [I10]  Yes      Resolved Hospital Problems   No resolved problems to display.          Assessment and Plan for problems addressed today:      amLODIPine  10 mg Oral Daily    aspirin  81 mg Oral Daily    atorvastatin  40 mg Oral Daily    carvedilol  12.5 mg Oral BID WM    clopidogrel  75 mg Oral Daily    heparin (porcine)  5,000 Units Subcutaneous Q8H    hydroxychloroquine  200 mg Oral BID    montelukast  10 mg Oral QHS    mycophenolate  500 mg Oral BID    pantoprazole  40 mg Oral Daily    predniSONE  25 mg Oral Daily     acetaminophen, albuterol, hydrOXYzine HCl, labetalol, ondansetron, polyethylene glycol, sodium chloride 0.9%, sodium chloride 0.9%    Acute toxic/metabolic Encephalopathy:  Resolved  -Etiology:  Infectious vs. Neurologic vs. Iatrogenic  -CT head, MRI/MRA brain nonacute.  Cerebral volume loss concerning for chronic ischemic changes  -EEG without any epileptiform activity, but does show slowing consistent with moderate encephalopathy  -Medication additions or changes could be affecting presentation, as patient was given multiple doses of baclofen and gabapentin.  Hold any sedating medications at this time.  -no antiepileptics for now  -consulted Neurology, appreciate recommendations  -aspiration precautions, fall precautions, seizure precautions  -neuro checks  -Continue correction of metabolic derangements  -Maintain Delirium precautions: Maintain regular sleep cycle. Early ambulation. Minimal interruptions overnight. Re-orient patient frequently. Maintain adequate bowel regimen, hydration and electrolyte replenishments. Hearing Aids and eye glasses as needed.     Seropositive rheumatoid arthritis of multiple sites  Long-term use of immunosuppressant medication  -On home plaquenil,  "mycophenolate, and prednisone daily for RA  -follows with Dr. Rouse rheumatology  -hx of  2/2 RA     Essential hypertension  -stable  -Continue PTA  amlodipine, Coreg  -monitor vitals q4h  -SBP goal of <160 in hospital    Dystonia  -Follows with Dr. Giang of Neurology  -Hold Gabapentin and Baclofen  for now given AMS     History of stroke  -Chronic and stable  -Continue Aspirin 81mg PO daily  -Continue Plavix 75mg PO daily    GERD:  -continue PPI    Hyperlipidemia:  -continue PTA atorvastatin 40 mg      Pt's hospitalization is medically necessary due to acute encephalopathy from concerns for seizures with superimposed toxic encephalopathy from diazepam, baclofen, gabapentin.  Patient was noted to be somnolent, and have worsening debility, muscle weakness.  Per neurology "pt's presentation is concerning for ENCP.  DIfDx includes new radiographically negative CVA, occult infectious/metabolic derangement, iatrogenic(polypharmacy:  Diazepam, baclofen) and potential epileptic activity"    DVT PPx:  Heparin    Discharge plan and follow up:  Patient medically stable to discharge to SNF, pending placement    Corie Signer MD  Hospital Medicine Staff  [phone] pager        "

## 2019-09-02 NOTE — PLAN OF CARE
Problem: Adult Inpatient Plan of Care  Goal: Plan of Care Review  Outcome: Ongoing (interventions implemented as appropriate)  Patient alert and oriented. Patient sitting up in bed. Patient denies any discomfort or distress at this time. Will continue to monitor.

## 2019-09-03 LAB
ANION GAP SERPL CALC-SCNC: 11 MMOL/L (ref 8–16)
BACTERIA BLD CULT: NORMAL
BACTERIA BLD CULT: NORMAL
BASOPHILS # BLD AUTO: 0.02 K/UL (ref 0–0.2)
BASOPHILS NFR BLD: 0.2 % (ref 0–1.9)
BUN SERPL-MCNC: 18 MG/DL (ref 8–23)
CALCIUM SERPL-MCNC: 9.5 MG/DL (ref 8.7–10.5)
CHLORIDE SERPL-SCNC: 105 MMOL/L (ref 95–110)
CO2 SERPL-SCNC: 23 MMOL/L (ref 23–29)
CREAT SERPL-MCNC: 1.1 MG/DL (ref 0.5–1.4)
DIFFERENTIAL METHOD: ABNORMAL
EOSINOPHIL # BLD AUTO: 0.1 K/UL (ref 0–0.5)
EOSINOPHIL NFR BLD: 1.2 % (ref 0–8)
ERYTHROCYTE [DISTWIDTH] IN BLOOD BY AUTOMATED COUNT: 13.7 % (ref 11.5–14.5)
EST. GFR  (AFRICAN AMERICAN): 54.4 ML/MIN/1.73 M^2
EST. GFR  (NON AFRICAN AMERICAN): 47.2 ML/MIN/1.73 M^2
GLUCOSE SERPL-MCNC: 67 MG/DL (ref 70–110)
HCT VFR BLD AUTO: 40.9 % (ref 37–48.5)
HGB BLD-MCNC: 12.3 G/DL (ref 12–16)
IMM GRANULOCYTES # BLD AUTO: 0.05 K/UL (ref 0–0.04)
IMM GRANULOCYTES NFR BLD AUTO: 0.5 % (ref 0–0.5)
LYMPHOCYTES # BLD AUTO: 2.7 K/UL (ref 1–4.8)
LYMPHOCYTES NFR BLD: 26.3 % (ref 18–48)
MAGNESIUM SERPL-MCNC: 1.8 MG/DL (ref 1.6–2.6)
MCH RBC QN AUTO: 27 PG (ref 27–31)
MCHC RBC AUTO-ENTMCNC: 30.1 G/DL (ref 32–36)
MCV RBC AUTO: 90 FL (ref 82–98)
MONOCYTES # BLD AUTO: 1.6 K/UL (ref 0.3–1)
MONOCYTES NFR BLD: 15.9 % (ref 4–15)
NEUTROPHILS # BLD AUTO: 5.8 K/UL (ref 1.8–7.7)
NEUTROPHILS NFR BLD: 55.9 % (ref 38–73)
NRBC BLD-RTO: 0 /100 WBC
PHOSPHATE SERPL-MCNC: 2.8 MG/DL (ref 2.7–4.5)
PLATELET # BLD AUTO: 239 K/UL (ref 150–350)
PMV BLD AUTO: 9.8 FL (ref 9.2–12.9)
POTASSIUM SERPL-SCNC: 4.6 MMOL/L (ref 3.5–5.1)
RBC # BLD AUTO: 4.55 M/UL (ref 4–5.4)
SODIUM SERPL-SCNC: 139 MMOL/L (ref 136–145)
WBC # BLD AUTO: 10.29 K/UL (ref 3.9–12.7)

## 2019-09-03 PROCEDURE — 99232 PR SUBSEQUENT HOSPITAL CARE,LEVL II: ICD-10-PCS | Mod: ,,, | Performed by: INTERNAL MEDICINE

## 2019-09-03 PROCEDURE — 63600175 PHARM REV CODE 636 W HCPCS: Performed by: NURSE PRACTITIONER

## 2019-09-03 PROCEDURE — 83735 ASSAY OF MAGNESIUM: CPT

## 2019-09-03 PROCEDURE — 99232 SBSQ HOSP IP/OBS MODERATE 35: CPT | Mod: ,,, | Performed by: INTERNAL MEDICINE

## 2019-09-03 PROCEDURE — 11000001 HC ACUTE MED/SURG PRIVATE ROOM

## 2019-09-03 PROCEDURE — 97110 THERAPEUTIC EXERCISES: CPT

## 2019-09-03 PROCEDURE — 97116 GAIT TRAINING THERAPY: CPT

## 2019-09-03 PROCEDURE — 36415 COLL VENOUS BLD VENIPUNCTURE: CPT

## 2019-09-03 PROCEDURE — 63600175 PHARM REV CODE 636 W HCPCS: Performed by: HOSPITALIST

## 2019-09-03 PROCEDURE — 25000003 PHARM REV CODE 250: Performed by: HOSPITALIST

## 2019-09-03 PROCEDURE — 85025 COMPLETE CBC W/AUTO DIFF WBC: CPT

## 2019-09-03 PROCEDURE — 25000003 PHARM REV CODE 250: Performed by: NURSE PRACTITIONER

## 2019-09-03 PROCEDURE — 30200315 PPD INTRADERMAL TEST REV CODE 302: Performed by: INTERNAL MEDICINE

## 2019-09-03 PROCEDURE — 80048 BASIC METABOLIC PNL TOTAL CA: CPT

## 2019-09-03 PROCEDURE — 86580 TB INTRADERMAL TEST: CPT | Performed by: INTERNAL MEDICINE

## 2019-09-03 PROCEDURE — 84100 ASSAY OF PHOSPHORUS: CPT

## 2019-09-03 PROCEDURE — 92507 TX SP LANG VOICE COMM INDIV: CPT

## 2019-09-03 RX ADMIN — HEPARIN SODIUM 5000 UNITS: 5000 INJECTION INTRAVENOUS; SUBCUTANEOUS at 09:09

## 2019-09-03 RX ADMIN — MYCOPHENOLATE MOFETIL 500 MG: 250 CAPSULE ORAL at 09:09

## 2019-09-03 RX ADMIN — ACETAMINOPHEN 650 MG: 325 TABLET ORAL at 09:09

## 2019-09-03 RX ADMIN — CARVEDILOL 12.5 MG: 12.5 TABLET, FILM COATED ORAL at 09:09

## 2019-09-03 RX ADMIN — PANTOPRAZOLE SODIUM 40 MG: 40 TABLET, DELAYED RELEASE ORAL at 09:09

## 2019-09-03 RX ADMIN — HEPARIN SODIUM 5000 UNITS: 5000 INJECTION INTRAVENOUS; SUBCUTANEOUS at 05:09

## 2019-09-03 RX ADMIN — ASPIRIN 81 MG: 81 TABLET, COATED ORAL at 09:09

## 2019-09-03 RX ADMIN — PREDNISONE 25 MG: 5 TABLET ORAL at 09:09

## 2019-09-03 RX ADMIN — HYDROXYZINE HYDROCHLORIDE 25 MG: 25 TABLET, FILM COATED ORAL at 09:09

## 2019-09-03 RX ADMIN — MYCOPHENOLATE MOFETIL 500 MG: 250 CAPSULE ORAL at 08:09

## 2019-09-03 RX ADMIN — CLOPIDOGREL BISULFATE 75 MG: 75 TABLET, FILM COATED ORAL at 09:09

## 2019-09-03 RX ADMIN — MONTELUKAST 10 MG: 10 TABLET, FILM COATED ORAL at 08:09

## 2019-09-03 RX ADMIN — HEPARIN SODIUM 5000 UNITS: 5000 INJECTION INTRAVENOUS; SUBCUTANEOUS at 04:09

## 2019-09-03 RX ADMIN — HYDROXYCHLOROQUINE SULFATE 200 MG: 200 TABLET, FILM COATED ORAL at 08:09

## 2019-09-03 RX ADMIN — HYDROXYCHLOROQUINE SULFATE 200 MG: 200 TABLET, FILM COATED ORAL at 09:09

## 2019-09-03 RX ADMIN — ATORVASTATIN CALCIUM 40 MG: 10 TABLET, FILM COATED ORAL at 09:09

## 2019-09-03 RX ADMIN — Medication 5 UNITS: at 06:09

## 2019-09-03 RX ADMIN — AMLODIPINE BESYLATE 10 MG: 10 TABLET ORAL at 09:09

## 2019-09-03 RX ADMIN — CARVEDILOL 12.5 MG: 12.5 TABLET, FILM COATED ORAL at 06:09

## 2019-09-03 NOTE — PLAN OF CARE
09/03/19 1334   Post-Acute Status   Post-Acute Authorization Placement   Post-Acute Placement Status Referrals Sent     Sw did send SNF referrals to Alvino Ambrocio to follow with other choices per [facility]. [facility] [place], [facility] and [facility].

## 2019-09-03 NOTE — PLAN OF CARE
Problem: Adult Inpatient Plan of Care  Goal: Plan of Care Review  Outcome: Ongoing (interventions implemented as appropriate)  Pt is a/ox4, up with one assist, VSS. Blood pressure is within set parameters given TIA. She denies pain, n/v, dizziness, and sob. Daughter is at bedside. Fall risks and precautions, as well as pain management were discussed, and any questions addressed. Pt remains free from falls and injury. Personal belongings and call light are within reach. Will continue to monitor.

## 2019-09-03 NOTE — PLAN OF CARE
09/03/19 0833   Post-Acute Status   Post-Acute Authorization Placement   Post-Acute Placement Status Awaiting Internal Medical Clearance

## 2019-09-03 NOTE — PLAN OF CARE
Problem: SLP Goal  Goal: SLP Goal  Speech Language Pathology Goals  Goals expected to be met by 9/10/19  1. Pt will Ox3 given min A/external aids.   2. Pt will complete mod complex comprehension tasks with 80% accy.  3. Pt will complete further assessment of mod complex word finding to determine need for tx.    4. Pt will participate in ongoing swallow assessment         Outcome: Ongoing (interventions implemented as appropriate)  Pt progressing with goals. Pt with increased level of alertness and word-finding this service day. ST to continue to follow.     LENORA Ray., [facility]-SLP  Speech-Language Pathology  Pager: [phone]  9/3/2019

## 2019-09-03 NOTE — PT/OT/SLP PROGRESS
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 female admitted with a medical diagnosis of Acute encephalopathy.  She presents with the following impairments/functional limitations:  weakness, impaired endurance, impaired self care skills, gait instability, impaired balance, impaired functional mobilty, decreased safety awareness, decreased lower extremity function.  Mrs. Lux tolerated treatment well. Noted min fatigue with mobility.  Also noted decrease strength with B LE's while performing exercises.  Patient requires assistance with all OOB mobility. She required VC's for safety and proper technique with transfers and gait training with a RW.  Mrs. Lux would benefit form further IP therapy.    Rehab Prognosis: Good; patient would benefit from acute skilled PT services to address these deficits and reach maximum level of function.    Recent Surgery: * No surgery found *      Plan:     During this hospitalization, patient to be seen 4 x/week to address the identified rehab impairments via gait training, therapeutic activities, therapeutic exercises and progress toward the following goals:    · Plan of Care Expires:  09/26/19    Subjective     Chief Complaint: Fatigue with mobility  Patient/Family Comments/goals: I get tired a lot faster.  Pain/Comfort:  · Pain Rating 1: 0/10      Objective:     Communicated with NSG prior to session.  Patient found up in chair with (no lines) upon PT entry to room.     General Precautions: Standard, aspiration, fall   Orthopedic Precautions:N/A   Braces:       Functional Mobility:  · Transfers:     · Sit to Stand:  contact guard assistance with rolling walker. VC's for  technique  · SPT from BS chair to BS chair with RW CGA  · Gait: Amb 20-25 feet with RW CGA. No LOB, but requierd VC's for walker mgmnt, proper step length and safety  · Stairs:  Ascend and descend 1 step x3 trials with single HR B UE support CGA-min assistance. VC's for technique       AM-PAC 6 CLICK MOBILITY  Turning over in bed (including adjusting bedclothes, sheets and blankets)?: 3  Sitting down on and standing up from a chair with arms (e.g., wheelchair, bedside commode, etc.): 3  Moving from lying on back to sitting on the side of the bed?: 3  Moving to and from a bed to a chair (including a wheelchair)?: 3  Need to walk in hospital room?: 3  Climbing 3-5 steps with a railing?: 3  Basic Mobility Total Score: 18       Therapeutic Activities and Exercises:   Performed seated exercises AP,GS,LAQ and seated hip flexion x15-20 reps. V/T cues for technique  Educated patient on POC    Patient left up in chair with call button in reach and NSG notified..    GOALS:   Multidisciplinary Problems     Physical Therapy Goals        Problem: Physical Therapy Goal    Goal Priority Disciplines Outcome Goal Variances Interventions   Physical Therapy Goal     PT, PT/OT Ongoing (interventions implemented as appropriate)     Description:  Goals to be met by: 9/10/19     Patient will increase functional independence with mobility by performin. Supine to sit with Stand-by Assistance  2. Sit to supine with Stand-by Assistance  3. Sit to stand transfer with Supervision  4. Bed to chair transfer with Supervision using Rolling Walker  5. Gait  x 100 feet with Stand-by Assistance using Rolling Walker.   6. Ascend/descend 5 stair with bilateral Handrails Contact Guard Assistance                      Time Tracking:     PT Received On: 19  PT Start Time: 1003     PT Stop Time: 1027  PT Total Time (min): 24 min     Billable Minutes: Gait Training 14 and Therapeutic Exercise 10    Treatment Type: Treatment  PT/PTA: PTA     PTA  Visit Number: 1     Giuseppe Smith II, PTA  09/03/2019

## 2019-09-03 NOTE — PLAN OF CARE
Problem: Adult Inpatient Plan of Care  Goal: Plan of Care Review  Outcome: Ongoing (interventions implemented as appropriate)  Pt progressing towards goals. Pt aao x 4, daughter @ bedside. Pt up with PT, ambulated with rolling walker. Denies pain or discomfort. No prs

## 2019-09-03 NOTE — PT/OT/SLP PROGRESS
"Speech Language Pathology Treatment    Patient Name:  Selma Alonzo Lux MD   MRN:  [id number]  Admitting Diagnosis: Acute encephalopathy    Recommendations:                 General Recommendations:  Speech/language therapy  Diet recommendations:  Regular, Liquid Diet Level: Thin   Aspiration Precautions: 1 bite/sip at a time, Check for pocketing/oral residue, Feed only when awake/alert, Frequent oral care, HOB to 90 degrees, Meds whole 1 at a time, Monitor for s/s of aspiration, Remain upright 30 minutes post meal, Small bites/sips and Strict aspiration precautions Continue to monitor for signs and symptoms of aspiration and discontinue oral feeding should you notice any of the following: watery eyes, reddened facial area, wet vocal quality, increased work of breathing, change in respiratory status, increased congestion, coughing, fever, etc.  General Precautions: Standard, aspiration, fall  Communication strategies:  provide increased time to answer and go to room if call light pushed    Subjective     SLP reviewed Pt with RN, RN reported Pt tolerated medications and meals t/o difficulty  Pt presents calm  She explains, "I don't like the taste" re: fish on lunch meal tray  Pt denies pain    Pain/Comfort:  · Pain Rating 1: 0/10    Objective:     Has the patient been evaluated by SLP for swallowing?   Yes  Keep patient NPO? No   Current Respiratory Status: room air      Pt seen for speech-language therapy. She was found awake in chair in room with untouched lunch meal tray in front of her on bedside table. She was oriented to person.  She denied difficulty with meals. Set-up of lunch tray was provided; however, after one bite of fish and twp sips of thin liquids (water), Patient declined additional bites/sips 2/2 minimal appetite.  No overt S/S aspiration with minimally accepted lunch tray items for regular diet with thin liquids.  She completed object naming tasks with 100% accuracy, I'ly.  She answered 2-unit YNQ " with average of 80% accuracy I'ly and up to 100% accuracy provided extra time and minimal verbal cues. She completed convergent naming tasks for a FO3-4 items with ~80% accuracy, I'ly. She required moderate verbal cues to sate sim/diff for a FO2 across 1 attempt. Pt educated on ongoing safety precautions, aspiration precautions and SLP role. No questions noted. Pt verbalized partial understanding.  Whiteboard current.  Pt remained upright in chair in room with call light in reach, as found, upon SLP exit from room.     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  She presents with increased alertness and word-finding this service day. ST to continue to monitor.     Goals:   Multidisciplinary Problems     SLP Goals        Problem: SLP Goal    Goal Priority Disciplines Outcome   SLP Goal     SLP Ongoing (interventions implemented as appropriate)   Description:  Speech Language Pathology Goals  Goals expected to be met by 9/10/19  1. Pt will Ox3 given min A/external aids.   2. Pt will complete mod complex comprehension tasks with 80% accy.  3. Pt will complete further assessment of mod complex word finding to determine need for tx.    4. Pt will participate in ongoing swallow assessment                           Plan:     · Patient to be seen:  3 x/week   · Plan of Care expires:  09/28/19  · Plan of Care reviewed with:  patient   · SLP Follow-Up:  Yes       Discharge recommendations:  nursing facility, skilled     Time Tracking:     SLP Treatment Date:   09/03/19  Speech Start Time:  1205  Speech Stop Time:  1220     Speech Total Time (min):  15 min    Billable Minutes: Speech Therapy Individual 15    JESSICA Ray, [facility]-SLP  Speech-Language Pathology  Pager: [phone]      09/03/2019

## 2019-09-03 NOTE — PROGRESS NOTES
Hospital Medicine  Progress Note    Team: [facility]OSP MED D Jocy Cox MD  Admit Date: 8/26/2019  LETICIA 9/5/2019  Length of Stay:  LOS: 4 days   Code status: Full Code    Principal Problem:  Acute encephalopathy    HPI:  83 y.o. 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  She noted that she had a doctor's appointment earlier today and appeared normal, went home, ate a normal dinner, but then approximately 2 hr later at about 9:00 p.m. the patient shouted for help and her daughter found her in the hallway hunched over her walker.  She states that she appeared very weak, and when she sat her down on the toilet to rest the patient was unable to finish a sentence because of confusion, and then began to have slurred speech.  This whole episode lasted approximately 30 min, and while debating coming to the ED it resolved.  The daughter notes a previous episode similar to this or in December 2018, she was admitted with encephalopathy, there is concern about hypertensive emergency versus viral meningitis as a cause.  Patient has a long complicated medical history. She sees Dr Giang for cervical dystonia and Botox injections and Dr Tobias for memory loss. Upon examination but still slightly confused but moving all extremities.      Interval history:  Patient with no new complaints. No significant events reported by Nursing. Pleasantly confused.    Review of Systems   Constitutional: Negative for fever.   Respiratory: Negative for shortness of breath.        Physical Exam  Temp:  [97.2 °F (36.2 °C)-98.1 °F (36.7 °C)]   Pulse:  [78-91]   Resp:  [16-18]   BP: (103-177)/(57-77)   SpO2:  [96 %-100 %]     Intake/Output Summary (Last 24 hours) at 9/3/2019 1712  Last data filed at 9/3/2019 1200  Gross per 24 hour   Intake 360 ml   Output --   Net 360 ml     Physical Exam   Constitutional:  Non-toxic appearance. No distress.    HENT:   Mouth/Throat: Mucous membranes are not pale and not cyanotic.   Eyes: Conjunctivae and lids are normal. Pupils are equal.   Cardiovascular: S1 normal and S2 normal.   Pulmonary/Chest: Effort normal and breath sounds normal.   Abdominal: Soft. Bowel sounds are normal. There is no tenderness.   Musculoskeletal: She exhibits no edema.   Neurological: She is alert. She is not disoriented.     Recent Labs   Lab 09/01/19  0433 09/02/19  0340 09/03/19  0342   WBC 8.63 8.98 10.29   HGB 12.1 11.2* 12.3   HCT 39.3 35.9* 40.9    214 239     Recent Labs   Lab 09/01/19  0433 09/02/19  0340 09/03/19  0342    137 139   K 3.9 4.1 4.6    104 105   CO2 25 24 23   BUN 20 23 18   CREATININE 1.1 1.0 1.1   GLU 90 85 67*   CALCIUM 9.4 9.0 9.5   MG 1.9 1.9 1.8   PHOS 2.3* 3.0 2.8     Recent Labs   Lab 08/28/19  0608 08/29/19  0441   ALKPHOS 45* 48*   ALT 7* 10   AST 11 16   ALBUMIN 3.7 3.7   PROT 6.2 6.4   BILITOT 0.7 0.9   INR 1.0  --       Recent Labs   Lab 03/13/19  1312 08/27/19  0057   HGBA1C 5.7* 5.6       Scheduled Meds:   amLODIPine  10 mg Oral Daily    aspirin  81 mg Oral Daily    atorvastatin  40 mg Oral Daily    carvedilol  12.5 mg Oral BID WM    clopidogrel  75 mg Oral Daily    heparin (porcine)  5,000 Units Subcutaneous Q8H    hydroxychloroquine  200 mg Oral BID    montelukast  10 mg Oral QHS    mycophenolate  500 mg Oral BID    pantoprazole  40 mg Oral Daily    predniSONE  25 mg Oral Daily    tuberculin  5 Units Intradermal Once     Continuous Infusions:   sodium chloride 0.9%       As Needed:  acetaminophen, albuterol, hydrOXYzine HCl, labetalol, ondansetron, polyethylene glycol, sodium chloride 0.9%, sodium chloride 0.9%    Active Hospital Problems    Diagnosis  POA    *Acute encephalopathy [G93.40]  Yes    Dyspnea [R06.00]  Yes    TIA (transient ischemic attack) [G45.9]  Unknown    Cryptogenic organizing pneumonia [J84.116]  Yes     Clinically suspected due to recurring  infiltrates on CXR in the setting of rheumatoid arthritis.  Work up for infection has been negative and the clinical picture is less consistent with acute infection.  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    · Slowly wean prednisone as tolerated.  · Now on sulfasalazine as per Dr. Rouse (Rheumatology).  It is probably reasonable to avoid biologics unless absolutely necessary given prior flare after infliximab.  · Repeat CXR from 12/25/2018 shows continued clearing.  · Consider repeat chest CT (non-contrast) once prednisone is at stable lowest dose possible.      Altered mental status [R41.82]  Yes    Medication side effect [T88.7XXA]  Unknown     · Hydroxychloroquine and Sulfasalazine      Chronic renal failure syndrome, stage 3 (moderate) [N18.3]  Yes    Essential hypertension [I10]  Yes      Resolved Hospital Problems   No resolved problems to display.       Overview of Hospital Course:  Patient was initially admitted to the Vascular Neurology service to rule out TIA.  MRI/MRA were negative for acute ischemia.  Patient underwent EEG which did not show epileptiform abnormalities. However, according to Vascular Neurology, due to patient's prior episode with similar symptoms in December 2018, there is a concern for possible seizures.  Patient also has superimposed toxic encephalopathy from baclofen and gabapentin overuse.  Neurology consulted    Assessment and Plan:      Acute toxic/metabolic Encephalopathy:  Resolving  -Etiology:  Infectious vs. Neurologic vs. Iatrogenic  -CT head, MRI/MRA brain nonacute.  Cerebral volume loss concerning for chronic ischemic changes  -EEG without any epileptiform activity, but does show slowing consistent with moderate encephalopathy  -Medication additions or changes could be affecting presentation, as patient was given multiple doses  of baclofen and gabapentin.  Hold any sedating medications at this time.  -no antiepileptics for now  -consulted Neurology, appreciate recommendations  -aspiration precautions, fall precautions, seizure precautions  -neuro checks  -Continue correction of metabolic derangements  -Maintain Delirium precautions     Seropositive rheumatoid arthritis of multiple sites  Long-term use of immunosuppressant medication  -On home plaquenil, mycophenolate, and prednisone daily for RA  -follows with Dr. Rouse in Rheumatology  -hx of  due to RA     Essential hypertension  -stable  -Continue PTA  amlodipine, Coreg  -monitor vitals q4h  -SBP goal of <160 in hospital     Dystonia  -Follows with Dr. Giang of Neurology  -Hold Gabapentin and Baclofen for now given AMS     History of stroke  -Chronic and stable  -Continue Aspirin 81mg PO daily  -Continue Plavix 75mg PO daily     GERD:  -continue PPI     Hyperlipidemia:  -continue PTA atorvastatin 40 mg     High Risk Conditions  Patient has an abrupt change in neurologic status: Possible Seizure     Diet:  Diet Cardiac  Diet Cardiac  GI Prophylaxis: Continued, chronic acid suppression therapy as OP and Indicated due to multiple minor risk factors  DVT Prophylaxis:     Anticoagulants   Medication Route Frequency    heparin (porcine) injection 5,000 Units Subcutaneous Q8H     Lines/ Drains/ Airways: PIV  Urinary Catheter Indicated: Patient Does Not Have Urinary Catheter  Other Lines/Tubes/Drains:  Wounds:    Goals of Care: Treatment Decisions  Discharge plan:  Skilled Nursing Facility    Jocy Cox MD  Department of Hospital Medicine  [id number]

## 2019-09-03 NOTE — PT/OT/SLP PROGRESS
Occupational Therapy      Patient Name:  Selma Lux MD   MRN:  [id number]    Patient remains appropriate for continued OT.  Will be treated next on 9/4/19.    AYAD Holguin  9/3/2019

## 2019-09-03 NOTE — PLAN OF CARE
Problem: Physical Therapy Goal  Goal: Physical Therapy Goal  Goals to be met by: 9/10/19     Patient will increase functional independence with mobility by performin. Supine to sit with Stand-by Assistance  2. Sit to supine with Stand-by Assistance  3. Sit to stand transfer with Supervision  4. Bed to chair transfer with Supervision using Rolling Walker  5. Gait  x 100 feet with Stand-by Assistance using Rolling Walker.   6. Ascend/descend 5 stair with bilateral Handrails Contact Guard Assistance     Patient goals remain appropriate.  Patient will continue to benefit from further PT services.  Giuseppe Smith, PTA

## 2019-09-04 LAB
ANION GAP SERPL CALC-SCNC: 13 MMOL/L (ref 8–16)
BASOPHILS # BLD AUTO: 0.02 K/UL (ref 0–0.2)
BASOPHILS NFR BLD: 0.2 % (ref 0–1.9)
BUN SERPL-MCNC: 23 MG/DL (ref 8–23)
CALCIUM SERPL-MCNC: 9.7 MG/DL (ref 8.7–10.5)
CHLORIDE SERPL-SCNC: 105 MMOL/L (ref 95–110)
CO2 SERPL-SCNC: 22 MMOL/L (ref 23–29)
CREAT SERPL-MCNC: 1.1 MG/DL (ref 0.5–1.4)
DIFFERENTIAL METHOD: ABNORMAL
EOSINOPHIL # BLD AUTO: 0.1 K/UL (ref 0–0.5)
EOSINOPHIL NFR BLD: 1.2 % (ref 0–8)
ERYTHROCYTE [DISTWIDTH] IN BLOOD BY AUTOMATED COUNT: 13.9 % (ref 11.5–14.5)
EST. GFR  (AFRICAN AMERICAN): 54.4 ML/MIN/1.73 M^2
EST. GFR  (NON AFRICAN AMERICAN): 47.2 ML/MIN/1.73 M^2
GLUCOSE SERPL-MCNC: 80 MG/DL (ref 70–110)
HCT VFR BLD AUTO: 39.4 % (ref 37–48.5)
HGB BLD-MCNC: 12.3 G/DL (ref 12–16)
IMM GRANULOCYTES # BLD AUTO: 0.07 K/UL (ref 0–0.04)
IMM GRANULOCYTES NFR BLD AUTO: 0.6 % (ref 0–0.5)
LYMPHOCYTES # BLD AUTO: 2.9 K/UL (ref 1–4.8)
LYMPHOCYTES NFR BLD: 24.2 % (ref 18–48)
MAGNESIUM SERPL-MCNC: 1.8 MG/DL (ref 1.6–2.6)
MCH RBC QN AUTO: 27.4 PG (ref 27–31)
MCHC RBC AUTO-ENTMCNC: 31.2 G/DL (ref 32–36)
MCV RBC AUTO: 88 FL (ref 82–98)
MONOCYTES # BLD AUTO: 1.6 K/UL (ref 0.3–1)
MONOCYTES NFR BLD: 13.1 % (ref 4–15)
NEUTROPHILS # BLD AUTO: 7.3 K/UL (ref 1.8–7.7)
NEUTROPHILS NFR BLD: 60.7 % (ref 38–73)
NRBC BLD-RTO: 0 /100 WBC
PHOSPHATE SERPL-MCNC: 3.1 MG/DL (ref 2.7–4.5)
PLATELET # BLD AUTO: 247 K/UL (ref 150–350)
PMV BLD AUTO: 9.8 FL (ref 9.2–12.9)
POTASSIUM SERPL-SCNC: 4.3 MMOL/L (ref 3.5–5.1)
RBC # BLD AUTO: 4.49 M/UL (ref 4–5.4)
SODIUM SERPL-SCNC: 140 MMOL/L (ref 136–145)
WBC # BLD AUTO: 12.06 K/UL (ref 3.9–12.7)

## 2019-09-04 PROCEDURE — 25000003 PHARM REV CODE 250: Performed by: HOSPITALIST

## 2019-09-04 PROCEDURE — 63600175 PHARM REV CODE 636 W HCPCS: Performed by: HOSPITALIST

## 2019-09-04 PROCEDURE — 63600175 PHARM REV CODE 636 W HCPCS: Performed by: NURSE PRACTITIONER

## 2019-09-04 PROCEDURE — 99231 PR SUBSEQUENT HOSPITAL CARE,LEVL I: ICD-10-PCS | Mod: ,,, | Performed by: INTERNAL MEDICINE

## 2019-09-04 PROCEDURE — 11000001 HC ACUTE MED/SURG PRIVATE ROOM

## 2019-09-04 PROCEDURE — 85025 COMPLETE CBC W/AUTO DIFF WBC: CPT

## 2019-09-04 PROCEDURE — 36415 COLL VENOUS BLD VENIPUNCTURE: CPT

## 2019-09-04 PROCEDURE — 97530 THERAPEUTIC ACTIVITIES: CPT

## 2019-09-04 PROCEDURE — 25000003 PHARM REV CODE 250: Performed by: NURSE PRACTITIONER

## 2019-09-04 PROCEDURE — 84100 ASSAY OF PHOSPHORUS: CPT

## 2019-09-04 PROCEDURE — 97535 SELF CARE MNGMENT TRAINING: CPT

## 2019-09-04 PROCEDURE — 80048 BASIC METABOLIC PNL TOTAL CA: CPT

## 2019-09-04 PROCEDURE — 83735 ASSAY OF MAGNESIUM: CPT

## 2019-09-04 PROCEDURE — 94761 N-INVAS EAR/PLS OXIMETRY MLT: CPT

## 2019-09-04 PROCEDURE — 99231 SBSQ HOSP IP/OBS SF/LOW 25: CPT | Mod: ,,, | Performed by: INTERNAL MEDICINE

## 2019-09-04 RX ADMIN — PREDNISONE 25 MG: 5 TABLET ORAL at 09:09

## 2019-09-04 RX ADMIN — ATORVASTATIN CALCIUM 40 MG: 10 TABLET, FILM COATED ORAL at 09:09

## 2019-09-04 RX ADMIN — HYDROXYCHLOROQUINE SULFATE 200 MG: 200 TABLET, FILM COATED ORAL at 09:09

## 2019-09-04 RX ADMIN — AMLODIPINE BESYLATE 10 MG: 10 TABLET ORAL at 09:09

## 2019-09-04 RX ADMIN — MONTELUKAST 10 MG: 10 TABLET, FILM COATED ORAL at 10:09

## 2019-09-04 RX ADMIN — PANTOPRAZOLE SODIUM 40 MG: 40 TABLET, DELAYED RELEASE ORAL at 09:09

## 2019-09-04 RX ADMIN — HYDROXYZINE HYDROCHLORIDE 25 MG: 25 TABLET, FILM COATED ORAL at 10:09

## 2019-09-04 RX ADMIN — MYCOPHENOLATE MOFETIL 500 MG: 250 CAPSULE ORAL at 10:09

## 2019-09-04 RX ADMIN — HEPARIN SODIUM 5000 UNITS: 5000 INJECTION INTRAVENOUS; SUBCUTANEOUS at 06:09

## 2019-09-04 RX ADMIN — CLOPIDOGREL BISULFATE 75 MG: 75 TABLET, FILM COATED ORAL at 09:09

## 2019-09-04 RX ADMIN — HEPARIN SODIUM 5000 UNITS: 5000 INJECTION INTRAVENOUS; SUBCUTANEOUS at 10:09

## 2019-09-04 RX ADMIN — CARVEDILOL 12.5 MG: 12.5 TABLET, FILM COATED ORAL at 09:09

## 2019-09-04 RX ADMIN — CARVEDILOL 12.5 MG: 12.5 TABLET, FILM COATED ORAL at 05:09

## 2019-09-04 RX ADMIN — HEPARIN SODIUM 5000 UNITS: 5000 INJECTION INTRAVENOUS; SUBCUTANEOUS at 05:09

## 2019-09-04 RX ADMIN — MYCOPHENOLATE MOFETIL 500 MG: 250 CAPSULE ORAL at 09:09

## 2019-09-04 RX ADMIN — ASPIRIN 81 MG: 81 TABLET, COATED ORAL at 09:09

## 2019-09-04 NOTE — PT/OT/SLP PROGRESS
Occupational Therapy   Treatment    Name: Selma Lux MD  MRN: [id number]  Admitting Diagnosis:  Acute encephalopathy       Recommendations:     Discharge Recommendations: nursing facility, skilled  Discharge Equipment Recommendations:  none  Barriers to discharge:  Inaccessible home environment    Assessment:     Selma Alonzo Lux MD is a 83 y.o. female with a medical diagnosis of Acute encephalopathy.  She presents with performance deficits including weakness, impaired endurance, impaired functional mobilty, impaired self care skills, impaired balance, decreased safety awareness, impaired cardiopulmonary response to activity. Pt would continue to benefit from OT to increase functional independence and safety. Recommend SNF upon D/C to return to PLOF.     Rehab Prognosis:  Good; patient would benefit from acute skilled OT services to address these deficits and reach maximum level of function.       Plan:     Patient to be seen 3 x/week to address the above listed problems via self-care/home management, therapeutic activities, therapeutic exercises  · Plan of Care Expires: 09/27/19  · Plan of Care Reviewed with: patient, sibling    Subjective     Pain/Comfort:  · Pain Rating 1: 0/10  · Pain Rating Post-Intervention 1: 0/10    Objective:     Communicated with: RN prior to session. Patient found with HOB elevated with (no lines) upon OT entry to room.  Pt's sister reports the pt had a bad night but the pt was unable to remember.    General Precautions: Standard, fall, aspiration   Orthopedic Precautions:N/A   Braces: N/A     Occupational Performance:     Bed Mobility:    · Patient completed Supine to Sit with contact guard assistance and with HOB elevated     Functional Mobility/Transfers:  · Patient completed Sit <> Stand Transfer with contact guard assistance with rolling walker from EOB and toilet  · Functional Mobility: Within room household distance with CGA-Min A using RW; seated rest break after  "short distance due to fatigue and reports of pain in "several areas" while standing    Activities of Daily Living:  · Feeding: set-up assistance sitting EOB to sort pills and take medications    · Grooming: contact guard assistance standing at sink to wash hands and face; forward flexed posture, leaning forward on sink due to fatigue  · Toileting: contact guard assistance for clothing management       [facility] 6 Click ADL: 16    Treatment & Education:  Pt able to sit EOB with close SBA while managing medications; completed functional mobility to bathroom for toileting and standing ADLs; returned to bedside chair and completed seated UE/LE therex; discussed POC and D/C recs with pt and sister    Patient left up in chair with all lines intact, call button in reach and sister presentEducation:      GOALS:   Multidisciplinary Problems     Occupational Therapy Goals        Problem: Occupational Therapy Goal    Goal Priority Disciplines Outcome Interventions   Occupational Therapy Goal     OT, PT/OT Ongoing (interventions implemented as appropriate)    Description:  Updated Goals to be met by: 7 days (9/11/19)     Patient will increase functional independence with ADLs by performing:    UE Dressing with Supervision.  LE Dressing with Stand-by Assistance.  Grooming while standing at sink with Stand-by Assistance.  Toileting from toilet with Stand-by Assistance for hygiene and clothing management.   Supine to sit with Supervision.  Toilet transfer to toilet with Stand-by Assistance.  Complete functional mobility household distance with SBA using AD as needed.    Goals to be met by: 7 days (9/2/19)     Patient will increase functional independence with ADLs by performing:    UE Dressing with Supervision.  LE Dressing with Stand-by Assistance.  Grooming while standing at sink with Stand-by Assistance.  Toileting from toilet with Stand-by Assistance for hygiene and clothing management.   Supine to sit with Stand-by " Assistance.  Toilet transfer to toilet with Stand-by Assistance.  Complete functional mobility household distance with SBA using AD as needed.                       Time Tracking:     OT Date of Treatment: 09/04/19  OT Start Time: 0922  OT Stop Time: 0946  OT Total Time (min): 24 min    Billable Minutes:Self Care/Home Management 14  Therapeutic Activity 10    AYAD Michaud  9/4/2019

## 2019-09-04 NOTE — PLAN OF CARE
Problem: Occupational Therapy Goal  Goal: Occupational Therapy Goal  Updated Goals to be met by: 7 days (9/11/19)     Patient will increase functional independence with ADLs by performing:    UE Dressing with Supervision.  LE Dressing with Stand-by Assistance.  Grooming while standing at sink with Stand-by Assistance.  Toileting from toilet with Stand-by Assistance for hygiene and clothing management.   Supine to sit with Supervision.  Toilet transfer to toilet with Stand-by Assistance.  Complete functional mobility household distance with SBA using AD as needed.    Goals to be met by: 7 days (9/2/19)     Patient will increase functional independence with ADLs by performing:    UE Dressing with Supervision.  LE Dressing with Stand-by Assistance.  Grooming while standing at sink with Stand-by Assistance.  Toileting from toilet with Stand-by Assistance for hygiene and clothing management.   Supine to sit with Stand-by Assistance.  Toilet transfer to toilet with Stand-by Assistance.  Complete functional mobility household distance with SBA using AD as needed.     Outcome: Ongoing (interventions implemented as appropriate)  Continue OT plan of care.

## 2019-09-04 NOTE — PROGRESS NOTES
Hospital Medicine  Progress Note    [place]: [facility] D Jocy Cox MD  Admit Date: 8/26/2019  LETICIA 9/5/2019  Length of Stay:  LOS: 5 days   Code status: Full Code    Principal Problem:  Acute encephalopathy    HPI:  83 y.o. 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  She noted that she had a doctor's appointment earlier today and appeared normal, went home, ate a normal dinner, but then approximately 2 hr later at about 9:00 p.m. the patient shouted for help and her daughter found her in the hallway hunched over her walker.  She states that she appeared very weak, and when she sat her down on the toilet to rest the patient was unable to finish a sentence because of confusion, and then began to have slurred speech.  This whole episode lasted approximately 30 min, and while debating coming to the ED it resolved.  The daughter notes a previous episode similar to this or in December 2018, she was admitted with encephalopathy, there is concern about hypertensive emergency versus viral meningitis as a cause.  Patient has a long complicated medical history. She sees Dr Giang for cervical dystonia and Botox injections and Dr Tobias for memory loss. Upon examination but still slightly confused but moving all extremities.      Interval history:  Patient with no new complaints. No significant events reported by Nursing.    Review of Systems   Constitutional: Negative for fever.   Respiratory: Negative for shortness of breath.    All other systems reviewed and are negative.      Physical Exam  Temp:  [97.6 °F (36.4 °C)-98.3 °F (36.8 °C)]   Pulse:  []   Resp:  [16-18]   BP: (116-181)/(58-81)   SpO2:  [96 %-97 %]     Intake/Output Summary (Last 24 hours) at 9/4/2019 1414  Last data filed at 9/4/2019 0600  Gross per 24 hour   Intake 240 ml   Output --   Net 240 ml     Physical Exam   Constitutional:  Non-toxic  appearance. No distress.   HENT:   Mouth/Throat: Mucous membranes are not pale and not cyanotic.   Eyes: Conjunctivae and lids are normal. Pupils are equal.   Cardiovascular: S1 normal and S2 normal.   Pulmonary/Chest: Effort normal and breath sounds normal.   Abdominal: Soft. Bowel sounds are normal. There is no tenderness.   Musculoskeletal: She exhibits no edema.   Neurological: She is alert. She is not disoriented.     Recent Labs   Lab 09/02/19  0340 09/03/19  0342 09/04/19  0448   WBC 8.98 10.29 12.06   HGB 11.2* 12.3 12.3   HCT 35.9* 40.9 39.4    239 247     Recent Labs   Lab 09/02/19  0340 09/03/19  0342 09/04/19  0448    139 140   K 4.1 4.6 4.3    105 105   CO2 24 23 22*   BUN 23 18 23   CREATININE 1.0 1.1 1.1   GLU 85 67* 80   CALCIUM 9.0 9.5 9.7   MG 1.9 1.8 1.8   PHOS 3.0 2.8 3.1     Recent Labs   Lab 08/29/19  0441   ALKPHOS 48*   ALT 10   AST 16   ALBUMIN 3.7   PROT 6.4   BILITOT 0.9      Recent Labs   Lab 03/13/19  1312 08/27/19  0057   HGBA1C 5.7* 5.6       Scheduled Meds:   amLODIPine  10 mg Oral Daily    aspirin  81 mg Oral Daily    atorvastatin  40 mg Oral Daily    carvedilol  12.5 mg Oral BID WM    clopidogrel  75 mg Oral Daily    heparin (porcine)  5,000 Units Subcutaneous Q8H    hydroxychloroquine  200 mg Oral BID    montelukast  10 mg Oral QHS    mycophenolate  500 mg Oral BID    pantoprazole  40 mg Oral Daily    predniSONE  25 mg Oral Daily     Continuous Infusions:   sodium chloride 0.9%       As Needed:  acetaminophen, albuterol, hydrOXYzine HCl, labetalol, ondansetron, polyethylene glycol, sodium chloride 0.9%, sodium chloride 0.9%    Active Hospital Problems    Diagnosis  POA    *Acute encephalopathy [G93.40]  Yes    Dyspnea [R06.00]  Yes    TIA (transient ischemic attack) [G45.9]  Unknown    Cryptogenic organizing pneumonia [J84.116]  Yes     Clinically suspected due to recurring infiltrates on CXR in the setting of rheumatoid arthritis.  Work up for  infection has been negative and the clinical picture is less consistent with acute infection.  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    · Slowly wean prednisone as tolerated.  · Now on sulfasalazine as per Dr. Rouse (Rheumatology).  It is probably reasonable to avoid biologics unless absolutely necessary given prior flare after infliximab.  · Repeat CXR from 12/25/2018 shows continued clearing.  · Consider repeat chest CT (non-contrast) once prednisone is at stable lowest dose possible.      Altered mental status [R41.82]  Yes    Medication side effect [T88.7XXA]  Unknown     · Hydroxychloroquine and Sulfasalazine      Chronic renal failure syndrome, stage 3 (moderate) [N18.3]  Yes    Essential hypertension [I10]  Yes      Resolved Hospital Problems   No resolved problems to display.       Overview of Hospital Course:  Patient was initially admitted to the Vascular Neurology service to rule out TIA.  MRI/MRA were negative for acute ischemia.  Patient underwent EEG which did not show epileptiform abnormalities. However, according to Vascular Neurology, due to patient's prior episode with similar symptoms in December 2018, there is a concern for possible seizures.  Patient also has superimposed toxic encephalopathy from baclofen and gabapentin overuse.  Neurology consulted    Assessment and Plan:      Acute toxic/metabolic Encephalopathy:  Resolving  -Etiology:  Infectious vs. Neurologic vs. Iatrogenic  -CT head, MRI/MRA brain nonacute.  Cerebral volume loss concerning for chronic ischemic changes  -EEG without any epileptiform activity, but does show slowing consistent with moderate encephalopathy  -Medication additions or changes could be affecting presentation, as patient was given multiple doses of baclofen and gabapentin.  Hold any sedating medications at this  time.  -no antiepileptics for now  -consulted Neurology, appreciate recommendations  -aspiration precautions, fall precautions, seizure precautions  -neuro checks  -Continue correction of metabolic derangements  -Maintain Delirium precautions     Seropositive rheumatoid arthritis of multiple sites  Long-term use of immunosuppressant medication  -On home plaquenil, mycophenolate, and prednisone daily for RA  -follows with Dr. Rouse in Rheumatology  -hx of  due to RA     Essential hypertension  -stable  -Continue PTA  amlodipine, Coreg  -monitor vitals q4h  -SBP goal of <160 in hospital     Dystonia  -Follows with Dr. Giang of Neurology  -Hold Gabapentin and Baclofen for now given AMS     History of stroke  -Chronic and stable  -Continue Aspirin 81mg PO daily  -Continue Plavix 75mg PO daily     GERD:  -continue PPI     Hyperlipidemia:  -continue PTA atorvastatin 40 mg     High Risk Conditions  Patient has an abrupt change in neurologic status: Possible Seizure     Diet:  Diet Cardiac  Diet Cardiac  GI Prophylaxis: Continued, chronic acid suppression therapy as OP and Indicated due to multiple minor risk factors  DVT Prophylaxis:     Anticoagulants   Medication Route Frequency    heparin (porcine) injection 5,000 Units Subcutaneous Q8H     Lines/ Drains/ Airways: PIV  Urinary Catheter Indicated: Patient Does Not Have Urinary Catheter  Other Lines/Tubes/Drains:  Wounds:    Goals of Care: Treatment Decisions  Discharge plan:  Skilled Nursing Facility    Jocy Cox MD  Department of Hospital Medicine  [id number]

## 2019-09-04 NOTE — PLAN OF CARE
09/04/19 0933   Post-Acute Status   Post-Acute Authorization Placement   Post-Acute Placement Status Referrals Sent     Sw left message with office of aging to complete locet, Sw to follow. Locet completed and pasrr faxed, Sw to follow with acceptance from SNF facilities.

## 2019-09-04 NOTE — NURSING
Safety maintained. NADN. Intermittent confusion noted throughout the shift. Pt noted wandering in hallway, redirected several times. Ambulatory with walker. Bed alarm reinitiated. Accepted meds whole, tolerated well. Adult brief in place, no signs of urinary or bowel incontinents. Daughter remain at bedside. Awaiting placement. Encouraged to call for assistance as needed. Bed in low position. Call bell in reach. Side rails up x2. Will continue to monitor.

## 2019-09-05 VITALS
HEART RATE: 82 BPM | OXYGEN SATURATION: 95 % | RESPIRATION RATE: 18 BRPM | WEIGHT: 177 LBS | DIASTOLIC BLOOD PRESSURE: 60 MMHG | HEIGHT: 65 IN | SYSTOLIC BLOOD PRESSURE: 128 MMHG | TEMPERATURE: 98 F | BODY MASS INDEX: 29.49 KG/M2

## 2019-09-05 PROBLEM — T88.7XXA MEDICATION SIDE EFFECT: Status: RESOLVED | Noted: 2018-12-16 | Resolved: 2019-09-05

## 2019-09-05 PROBLEM — G93.40 ACUTE ENCEPHALOPATHY: Status: RESOLVED | Noted: 2019-08-27 | Resolved: 2019-09-05

## 2019-09-05 LAB
ANION GAP SERPL CALC-SCNC: 9 MMOL/L (ref 8–16)
BASOPHILS # BLD AUTO: 0.03 K/UL (ref 0–0.2)
BASOPHILS NFR BLD: 0.3 % (ref 0–1.9)
BUN SERPL-MCNC: 22 MG/DL (ref 8–23)
CALCIUM SERPL-MCNC: 9.3 MG/DL (ref 8.7–10.5)
CHLORIDE SERPL-SCNC: 103 MMOL/L (ref 95–110)
CO2 SERPL-SCNC: 26 MMOL/L (ref 23–29)
CREAT SERPL-MCNC: 1.1 MG/DL (ref 0.5–1.4)
DIFFERENTIAL METHOD: ABNORMAL
EOSINOPHIL # BLD AUTO: 0.1 K/UL (ref 0–0.5)
EOSINOPHIL NFR BLD: 0.9 % (ref 0–8)
ERYTHROCYTE [DISTWIDTH] IN BLOOD BY AUTOMATED COUNT: 13.9 % (ref 11.5–14.5)
EST. GFR  (AFRICAN AMERICAN): 54.4 ML/MIN/1.73 M^2
EST. GFR  (NON AFRICAN AMERICAN): 47.2 ML/MIN/1.73 M^2
GLUCOSE SERPL-MCNC: 74 MG/DL (ref 70–110)
HCT VFR BLD AUTO: 37.9 % (ref 37–48.5)
HGB BLD-MCNC: 11.8 G/DL (ref 12–16)
IMM GRANULOCYTES # BLD AUTO: 0.08 K/UL (ref 0–0.04)
IMM GRANULOCYTES NFR BLD AUTO: 0.7 % (ref 0–0.5)
LYMPHOCYTES # BLD AUTO: 2.5 K/UL (ref 1–4.8)
LYMPHOCYTES NFR BLD: 22.2 % (ref 18–48)
MAGNESIUM SERPL-MCNC: 1.9 MG/DL (ref 1.6–2.6)
MCH RBC QN AUTO: 27.4 PG (ref 27–31)
MCHC RBC AUTO-ENTMCNC: 31.1 G/DL (ref 32–36)
MCV RBC AUTO: 88 FL (ref 82–98)
MONOCYTES # BLD AUTO: 1.4 K/UL (ref 0.3–1)
MONOCYTES NFR BLD: 12.6 % (ref 4–15)
NEUTROPHILS # BLD AUTO: 7.1 K/UL (ref 1.8–7.7)
NEUTROPHILS NFR BLD: 63.3 % (ref 38–73)
NRBC BLD-RTO: 0 /100 WBC
PHOSPHATE SERPL-MCNC: 3.4 MG/DL (ref 2.7–4.5)
PLATELET # BLD AUTO: 249 K/UL (ref 150–350)
PMV BLD AUTO: 9.6 FL (ref 9.2–12.9)
POTASSIUM SERPL-SCNC: 4.4 MMOL/L (ref 3.5–5.1)
RBC # BLD AUTO: 4.31 M/UL (ref 4–5.4)
SODIUM SERPL-SCNC: 138 MMOL/L (ref 136–145)
WBC # BLD AUTO: 11.14 K/UL (ref 3.9–12.7)

## 2019-09-05 PROCEDURE — 85025 COMPLETE CBC W/AUTO DIFF WBC: CPT

## 2019-09-05 PROCEDURE — 25000003 PHARM REV CODE 250: Performed by: HOSPITALIST

## 2019-09-05 PROCEDURE — 63600175 PHARM REV CODE 636 W HCPCS: Performed by: HOSPITALIST

## 2019-09-05 PROCEDURE — 97803 MED NUTRITION INDIV SUBSEQ: CPT

## 2019-09-05 PROCEDURE — 83735 ASSAY OF MAGNESIUM: CPT

## 2019-09-05 PROCEDURE — 99239 HOSP IP/OBS DSCHRG MGMT >30: CPT | Mod: ,,, | Performed by: INTERNAL MEDICINE

## 2019-09-05 PROCEDURE — 36415 COLL VENOUS BLD VENIPUNCTURE: CPT

## 2019-09-05 PROCEDURE — 97535 SELF CARE MNGMENT TRAINING: CPT

## 2019-09-05 PROCEDURE — 84100 ASSAY OF PHOSPHORUS: CPT

## 2019-09-05 PROCEDURE — 80048 BASIC METABOLIC PNL TOTAL CA: CPT

## 2019-09-05 PROCEDURE — 25000003 PHARM REV CODE 250: Performed by: NURSE PRACTITIONER

## 2019-09-05 PROCEDURE — 99239 PR HOSPITAL DISCHARGE DAY,>30 MIN: ICD-10-PCS | Mod: ,,, | Performed by: INTERNAL MEDICINE

## 2019-09-05 PROCEDURE — 63600175 PHARM REV CODE 636 W HCPCS: Performed by: NURSE PRACTITIONER

## 2019-09-05 RX ORDER — ACETAMINOPHEN 325 MG/1
650 TABLET ORAL EVERY 6 HOURS PRN
Refills: 0 | Status: ON HOLD | COMMUNITY
Start: 2019-09-05 | End: 2020-03-10 | Stop reason: SDUPTHER

## 2019-09-05 RX ORDER — AMLODIPINE BESYLATE 10 MG/1
10 TABLET ORAL DAILY
Start: 2019-09-05 | End: 2019-10-17 | Stop reason: SDUPTHER

## 2019-09-05 RX ADMIN — ATORVASTATIN CALCIUM 40 MG: 10 TABLET, FILM COATED ORAL at 08:09

## 2019-09-05 RX ADMIN — AMLODIPINE BESYLATE 10 MG: 10 TABLET ORAL at 08:09

## 2019-09-05 RX ADMIN — HYDROXYCHLOROQUINE SULFATE 200 MG: 200 TABLET, FILM COATED ORAL at 08:09

## 2019-09-05 RX ADMIN — ASPIRIN 81 MG: 81 TABLET, COATED ORAL at 08:09

## 2019-09-05 RX ADMIN — HEPARIN SODIUM 5000 UNITS: 5000 INJECTION INTRAVENOUS; SUBCUTANEOUS at 02:09

## 2019-09-05 RX ADMIN — CARVEDILOL 12.5 MG: 12.5 TABLET, FILM COATED ORAL at 05:09

## 2019-09-05 RX ADMIN — HEPARIN SODIUM 5000 UNITS: 5000 INJECTION INTRAVENOUS; SUBCUTANEOUS at 05:09

## 2019-09-05 RX ADMIN — MYCOPHENOLATE MOFETIL 500 MG: 250 CAPSULE ORAL at 08:09

## 2019-09-05 RX ADMIN — CARVEDILOL 12.5 MG: 12.5 TABLET, FILM COATED ORAL at 08:09

## 2019-09-05 RX ADMIN — CLOPIDOGREL BISULFATE 75 MG: 75 TABLET, FILM COATED ORAL at 08:09

## 2019-09-05 RX ADMIN — PREDNISONE 25 MG: 5 TABLET ORAL at 08:09

## 2019-09-05 RX ADMIN — PANTOPRAZOLE SODIUM 40 MG: 40 TABLET, DELAYED RELEASE ORAL at 08:09

## 2019-09-05 NOTE — PLAN OF CARE
Problem: SLP Goal  Goal: SLP Goal  Speech Language Pathology Goals  Goals expected to be met by 9/10/19  1. Pt will Ox3 given min A/external aids.   2. Pt will complete mod complex comprehension tasks with 80% accy.  3. Pt will complete further assessment of mod complex word finding to determine need for tx.    4. Pt will participate in ongoing swallow assessment          Outcome: Unable to achieve outcome(s) by discharge  Pt seen for ST. Pt progressing with goals.     LENORA Ray., [facility]-SLP  Speech-Language Pathology  Pager: [phone]  9/5/2019

## 2019-09-05 NOTE — PLAN OF CARE
Spoke with daughter Rosy. Discussed the prednisone administration. Pt. was started on prednisone by Dr. Rouse prior to hospitalization.   Pt. wll continue on prednisone. Daughter is very knowledgeable about dosing and weaning instructions from Dr. Rouse and Dr. Francis. Pt. has been accepted to [facility]. SNF orders written. Transfer to take place this afternoon after daughter goes to facility and signs paperwork. Daughter understands the importance of f/u with Nasim Gilliam for prednisone administration.

## 2019-09-05 NOTE — PLAN OF CARE
Problem: Adult Inpatient Plan of Care  Goal: Plan of Care Review  Outcome: Outcome(s) achieved Date Met: 09/05/19  Patient stable, no report of pain or symptoms of discomfort.  Was up with assistance in shower today, appetite fair, no falls.  Vital signs WDL.  Daughter present during day and arrangements to transfer to [facility] have been made.  Patient awaiting transport.

## 2019-09-05 NOTE — PLAN OF CARE
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  Work up for infection has been negative and the clinical picture is less consistent with acute infection.  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    · Slowly wean prednisone as tolerated.  · Now on sulfasalazine as per Dr. Rouse (Rheumatology).  It is probably reasonable to avoid biologics unless absolutely necessary given prior flare after infliximab.  · Repeat CXR from 12/25/2018 shows continued clearing.  · Consider repeat chest CT (non-contrast) once prednisone is at stable lowest dose possible.      Chronic renal failure syndrome, stage 3 (moderate) [N18.3]  Yes    Vascular dementia [F01.50]  Yes    Essential hypertension [I10]  Yes    Seropositive rheumatoid arthritis of multiple sites [M05.79]  Yes     Dx 2012 with Dr No, then Kiera  HCQ since 2012  Prednisone 5 mg/d since 2012  Humira one dose April 2018 followed by ICU admission for pnemonia and resp failure          Resolved Hospital Problems    Diagnosis Date Resolved POA    *Acute encephalopathy [G93.40] 09/05/2019 Yes    Altered mental status [R41.82] 09/05/2019 Yes    Medication side effect [T88.7XXA] 09/05/2019 Yes     · Hydroxychloroquine and Sulfasalazine         Patient is homebound due to:  Acute encephalopathy    Allergies:Review of patient's allergies  indicates:  No Known Allergies    Vitals:    Every shift (Skilled Nursing patients)    Diet: Diet Cardiac Supplement:  1 can Boost Glucose Control  three times a day with meals    Acitivities:     - Up in a chair each morning as tolerated   - Ambulate with assistance to bathroom   - May use walker    LABS:  Per facility protocol    Nursing Precautions:    - Aspiration precautions:             -  Assistance with meals            -  Upright 90 degrees before, during and after meals         - Fall precautions per nursing home protocol   - Seizure precaution per [facility] protocol   - Decubitus precautions:        -  for positioning   - Pressure reducing foam mattress   - Turn patient every two hours. Use wedge pillows to anchor patient    CONSULTS:       Physical Therapy to evaluate and treat     Occupational Therapy to evaluate and treat     Speech Therapy  to evaluate and treat     Nutrition to evaluate and recommend diet      MISCELLANEOUS CARE:      Routine Skin Care:  Apply moisture barrier cream to all    skin folds and wet areas in perineal area daily and after baths and                           all bowel movements.    Future Appointments   Date Time Provider Department Center   9/9/2019  2:40 PM Bhargav Hirsch MD [facility] IM [facility] PCW   9/27/2019 11:30 AM Lonnie Rouse MD [facility] RHEUM [facility]   10/17/2019  3:40 PM Baldomero Giang MD [facility] NEURO [facility]   12/9/2019  2:00 PM Baldomero Francis MD [facility] PULMSVC [facility]       Follow-up Information     Bhargav Hirsch MD On 9/9/2019.    Specialties:  Family Medicine, Sports Medicine  Why:  at 2:40pm; previously scheduled appointment  Contact information:  [address]  [place] [address]  [phone]             Lonnie Rouse MD. Schedule an appointment as soon as possible for a visit in 1 week.    Specialty:  Rheumatology  Why:  For discharge from hospital follow up  Contact information:  [address] [place] [place]  [address]  [phone]             Schedule an appointment as soon as possible for a visit with Baldomero Giang MD.    Specialty:  Neurology  Why:  As previously planned  Contact information:  [address]  [facility] [address]  [phone]                 Medications: Discontinue all previous medication orders, if any. See new list below.    acetaminophen (TYLENOL) 325 MG tablet  Take 2 tablets (650 mg total) by mouth every 6 (six) hours as needed.     albuterol (PROVENTIL/VENTOLIN HFA) 90 mcg/actuation inhaler  Inhale 2 puffs into the lungs every 6 (six) hours as needed for Wheezing.      amLODIPine (NORVASC) 10 MG tablet  Take 1 tablet (10 mg total) by mouth once daily.     aspirin (ECOTRIN) 81 MG EC tablet  Take 81 mg by mouth once daily.     atorvastatin (LIPITOR) 40 MG tablet  Take 1 tablet (40 mg total) by mouth once daily.     carvedilol (COREG) 12.5 MG tablet  Take 1 tablet (12.5 mg total) by mouth 2 (two) times daily with meals.     clopidogrel (PLAVIX) 75 mg tablet  Take 1 tablet (75 mg total) by mouth once daily.     hydroxychloroquine (PLAQUENIL) 200 mg tablet  Take 1 tablet (200 mg total) by mouth 2 (two) times daily.     montelukast (SINGULAIR) 10 mg tablet  Take 1 tablet (10 mg total) by mouth every evening.     mycophenolate (CELLCEPT) 500 mg Tab  Take 1 tablet (500 mg total) by mouth 2 (two) times daily.     omeprazole (PRILOSEC) 20 MG capsule  Take 1 capsule (20 mg total) by mouth once daily.     predniSONE (DELTASONE) 5 MG tablet  Take 5 tablets (25 mg total) by mouth once daily.       _________________________________  Jocy Cox MD  09/05/2019

## 2019-09-05 NOTE — PLAN OF CARE
Problem: Adult Inpatient Plan of Care  Goal: Plan of Care Review      Recommendations    1. Continue Regular diet.    - Encourage po intake.     2. Add Boost Glucose Control with meals to aid in caloric intake.   3. RD following.     Goals: consume >50% of all meals  Nutrition Goal Status: new

## 2019-09-05 NOTE — PLAN OF CARE
SNF transfer orders noted. Pt. has been accepted to [facility] today. Transportation to be set up.     Future Appointments   Date Time Provider Department Center   9/9/2019  2:40 PM Bhargav Lee MD [facility] IM [facility] PCW   9/27/2019 11:30 AM Lonnie Rouse MD [facility] RHEUM [facility]   10/17/2019  3:40 PM Baldomero Giang MD [facility] NEURO [facility]   12/9/2019  2:00 PM Baldomero Francis MD [facility] PULMSVC [facility]

## 2019-09-05 NOTE — PLAN OF CARE
09/05/19 0846   Post-Acute Status   Post-Acute Authorization Placement   Post-Acute Placement Status Referrals Sent     Pt accepted to [facility] aware and Alvino provided contact number to facility and provided facility dght's number. Staff aware, SNF orders needed. SNF orders uploaded, awaiting [facility] to complete paperwork, Brianda and Etelvina in admissions aware.

## 2019-09-05 NOTE — PLAN OF CARE
Sw to update nurse if dght completes paperwork with [facility] and Pt can d/c today, unsure about transportation, Sw to follow with dght with Rosy.

## 2019-09-05 NOTE — DISCHARGE SUMMARY
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  Work up for infection has been negative and the clinical picture is less consistent with acute infection.  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    · Slowly wean prednisone as tolerated.  · Now on sulfasalazine as per Dr. Rouse (Rheumatology).  It is probably reasonable to avoid biologics unless absolutely necessary given prior flare after infliximab.  · Repeat CXR from 12/25/2018 shows continued clearing.  · Consider repeat chest CT (non-contrast) once prednisone is at stable lowest dose possible.      Chronic renal failure syndrome, stage 3 (moderate) [N18.3]  Yes    Vascular dementia [F01.50]  Yes    Essential hypertension [I10]  Yes    Seropositive rheumatoid arthritis of multiple sites [M05.79]  Yes     Dx 2012 with rafal Del Angel  HCQ since 2012  Prednisone 5 mg/d since 2012  Humira one dose April 2018 followed by ICU admission for pnemonia and resp failure          Resolved Hospital Problems    Diagnosis Date Resolved POA    *Acute encephalopathy [G93.40] 09/05/2019 Yes    Altered mental status [R41.82] 09/05/2019 Yes    Medication side effect [T88.7XXA] 09/05/2019 Yes     · Hydroxychloroquine and Sulfasalazine          HPI: 83 y.o. female with PMHx of cryptogenic organizing pneumonia secondary to seropositive RA, HTN, CVA who is here after an  episode of altered mental status.  She noted that she had a doctor's appointment earlier today and appeared normal, went home, ate a normal dinner, but then approximately 2 hr later at about 9:00 p.m. the patient shouted for help and her daughter found her in the hallway hunched over her walker.  She states that she appeared very weak, and when she sat her down on the toilet to rest the patient was unable to finish a sentence because of confusion, and then began to have slurred speech.  This whole episode lasted approximately 30 min, and while debating coming to the ED it resolved.  The daughter notes a previous episode similar to this or in December 2018, she was admitted with encephalopathy, there is concern about hypertensive emergency versus viral meningitis as a cause.  Patient has a long complicated medical history. She sees Dr Giang for cervical dystonia and Botox injections and Dr Tobias for memory loss. Upon examination but still slightly confused but moving all extremities.      Hospital Course: Patient was initially admitted to the Vascular Neurology service to rule out TIA.  MRI/MRA were negative for acute ischemia.  Patient underwent EEG which did not show epileptiform abnormalities. However, according to Vascular Neurology, due to patient's prior episode with similar symptoms in December 2018, there is a concern for possible seizures.  Patient also has superimposed toxic encephalopathy from baclofen and gabapentin overuse.  Neurology consulted.     Acute toxic/metabolic Encephalopathy  -Etiology:  Infectious vs. Neurologic vs. Iatrogenic  -CT head, MRI/MRA brain nonacute.  Cerebral volume loss concerning for chronic ischemic changes  -EEG without any epileptiform activity, but does show slowing consistent with moderate encephalopathy  -Medication additions or changes could be affecting presentation, as patient was given multiple doses of baclofen and gabapentin.  Hold any sedating medications at  this time.  -no antiepileptics for now  -consulted Neurology  -aspiration precautions, fall precautions, seizure precautions  -Continue correction of metabolic derangements  -Maintain Delirium precautions     Seropositive rheumatoid arthritis of multiple sites  Long-term use of immunosuppressant medication  -On home plaquenil, mycophenolate, and prednisone daily for RA  -follows with Dr. Rouse in Rheumatology  -hx of  due to RA     Essential hypertension  -stable  -Continue PTA  amlodipine, Coreg  -monitor vitals q4h  -SBP goal of <160 in hospital     Dystonia  -Follows with Dr. Giang of Neurology  -Held Gabapentin and Baclofen for now given AMS and potential for interaction     History of stroke  -Chronic and stable  -Continue Aspirin 81mg PO daily  -Continue Plavix 75mg PO daily     GERD:  -continue PPI     Hyperlipidemia:  -continue PTA atorvastatin 40 mg       Recent Labs   Lab 09/03/19  0342 09/04/19  0448 09/05/19  0505   WBC 10.29 12.06 11.14   HGB 12.3 12.3 11.8*   HCT 40.9 39.4 37.9    247 249     Recent Labs   Lab 09/03/19  0342 09/04/19  0448 09/05/19  0505    140 138   K 4.6 4.3 4.4    105 103   CO2 23 22* 26   BUN 18 23 22   CREATININE 1.1 1.1 1.1   GLU 67* 80 74   CALCIUM 9.5 9.7 9.3   MG 1.8 1.8 1.9   PHOS 2.8 3.1 3.4        Procedures: EEG    Consultants:   Consults (From admission, onward)        Status Ordering Provider     Inpatient consult to Neurology  Once     Provider:  (Not yet assigned)    Completed NINOSKA PEREYRA     Inpatient consult to Registered Dietitian/Nutritionist  Once     Provider:  (Not yet assigned)    Completed LILIAM CHOU     Inpatient consult to SNF  Once     Provider:  (Not yet assigned)    Acknowledged BRIAN VIRK     Inpatient consult to Vascular Neurology  Once     Provider:  (Not yet assigned)    Completed ADAN JOY     IP consult to case management/social work  Once     Provider:  (Not yet assigned)    Completed JOSÉ ANTONIO  LILIAM COLMENARES          Medications:    Current Discharge Medication List      START taking these medications    Details   atorvastatin (LIPITOR) 40 MG tablet Take 1 tablet (40 mg total) by mouth once daily.  Qty: 90 tablet, Refills: 3         CONTINUE these medications which have CHANGED    Details   acetaminophen (TYLENOL) 325 MG tablet Take 2 tablets (650 mg total) by mouth every 6 (six) hours as needed.  Refills: 0      amLODIPine (NORVASC) 10 MG tablet Take 1 tablet (10 mg total) by mouth once daily.      predniSONE (DELTASONE) 5 MG tablet Take 5 tablets (25 mg total) by mouth once daily. for 10 days  Qty: 50 tablet, Refills: 0         CONTINUE these medications which have NOT CHANGED    Details   albuterol (PROVENTIL/VENTOLIN HFA) 90 mcg/actuation inhaler Inhale 2 puffs into the lungs every 6 (six) hours as needed for Wheezing. Rescue  Qty: 1 Inhaler, Refills: 0    Associated Diagnoses: Pneumonia of both lungs due to infectious organism, unspecified part of lung      aspirin (ECOTRIN) 81 MG EC tablet Take 81 mg by mouth once daily.      carvedilol (COREG) 12.5 MG tablet Take 1 tablet (12.5 mg total) by mouth 2 (two) times daily with meals.  Qty: 180 tablet, Refills: 3    Associated Diagnoses: Hypertension, essential      clopidogrel (PLAVIX) 75 mg tablet Take 1 tablet (75 mg total) by mouth once daily.  Qty: 90 tablet, Refills: 1      hydroxychloroquine (PLAQUENIL) 200 mg tablet Take 1 tablet (200 mg total) by mouth 2 (two) times daily.  Qty: 60 tablet, Refills: 2    Associated Diagnoses: Seropositive rheumatoid arthritis of multiple sites      linaclotide (LINZESS) 145 mcg Cap capsule Take 1 capsule (145 mcg total) by mouth once daily.  Qty: 30 capsule, Refills: 0      montelukast (SINGULAIR) 10 mg tablet Take 1 tablet (10 mg total) by mouth every evening.  Qty: 90 tablet, Refills: 0      mycophenolate (CELLCEPT) 500 mg Tab Take 1 tablet (500 mg total) by mouth 2 (two) times daily.  Qty: 180 tablet, Refills: 0     Associated Diagnoses: Cryptogenic organizing pneumonia      omeprazole (PRILOSEC) 20 MG capsule Take 1 capsule (20 mg total) by mouth once daily.  Qty: 90 capsule, Refills: 0         STOP taking these medications       baclofen (LIORESAL) 10 MG tablet Comments:   Reason for Stopping:         diazePAM (VALIUM) 2 MG tablet Comments:   Reason for Stopping:         furosemide (LASIX) 20 MG tablet Comments:   Reason for Stopping:         furosemide (LASIX) 40 MG tablet Comments:   Reason for Stopping:         gabapentin (NEURONTIN) 300 MG capsule Comments:   Reason for Stopping:         magnesium oxide (MAG-OX) 400 mg (241.3 mg magnesium) tablet Comments:   Reason for Stopping:         potassium chloride SA (K-DUR,KLOR-CON) 10 MEQ tablet Comments:   Reason for Stopping:         sulfaSALAzine (AZULFIDINE) 500 MG TbEC Comments:   Reason for Stopping:         traMADol (ULTRAM) 50 mg tablet Comments:   Reason for Stopping:               Discharge Instructions:  Discharge Procedure Orders   Ambulatory Referral to Outpatient Case Management   Referral Priority: Routine Referral Type: Consultation   Referral Reason: Specialty Services Required   Number of Visits Requested: 1     Diet Cardiac   Order Comments: Boost Glucose Control TID     Notify your health care provider if you experience any of the following:  temperature >100.4     Notify your health care provider if you experience any of the following:  persistent nausea and vomiting or diarrhea     Notify your health care provider if you experience any of the following:  difficulty breathing or increased cough     Notify your health care provider if you experience any of the following:  persistent dizziness, light-headedness, or visual disturbances     Notify your health care provider if you experience any of the following:  increased confusion or weakness     Activity as tolerated       Discharge Condition: stable    Disposition: Skilled Nursing Facility    Indwelling  Lines/Drains at time of discharge: none     Tests pending at the time of discharge: none      Time spent on the discharge of the patient including review of hospital course with the patient, reviewing discharge medications and arranging follow-up care: 45 minutes.    Discharge examination Patient was seen and examined on 9/5/2019 and determined to be suitable for discharge.    Discharge plan and follow up:  Follow-up Information     Bhargav Hirsch MD On 9/9/2019.    Specialties:  Family Medicine, Sports Medicine  Why:  at 2:40pm; previously scheduled appointment  Contact information:  [address]  [facility] [address]  [phone]         Lonnie Rouse MD. Schedule an appointment as soon as possible for a visit in 1 week.    Specialty:  Rheumatology  Why:  For discharge from hospital follow up  Contact information:  [address]  [facility] [address]  [phone]         Schedule an appointment as soon as possible for a visit with Baldomero Giang MD.    Specialty:  Neurology  Why:  As previously planned  Contact information:  [address]  [facility] [address]  [phone]             Future Appointments   Date Time Provider Department [place]   9/9/2019  2:40 PM Bhargav Hirsch MD [facility] IM [facility] PCW   9/27/2019 11:30 AM Lonnie Rouse MD [facility] RHEUM [facility]   10/17/2019  3:40 PM Baldomero Giang MD [facility] NEURO [place] [facility]   12/9/2019  2:00 PM Baldomero Francis MD [facility] PULMSVC [facility]       Provider  Jocy Cox MD  Department of Hospital Medicine  [facility]

## 2019-09-05 NOTE — PT/OT/SLP PROGRESS
Physical Therapy      Patient Name:  Selma Lux MD   MRN:  [id number]    Patient not seen today secondary to patient unwilling to participate.  Attempted to treat pt at 1302, pt refused sec to desire to stay in the bed and expectations of discharge to SNF today.    Pam Cline, PT

## 2019-09-05 NOTE — PROGRESS NOTES
"[facility]  Adult Nutrition  Progress Note    SUMMARY       Recommendations    1. Continue Regular diet.    - Encourage po intake.     2. Add Boost Glucose Control with meals to aid in caloric intake.   3. RD following.     Goals: consume >50% of all meals  Nutrition Goal Status: new  Communication of RD Recs: (POC)    Reason for Assessment    Reason For Assessment: RD follow-up, length of stay  Diagnosis: (Acute encephalopathy)  Relevant Medical History: cryptogenic organizing pneumonia secondary to seropositive RA, HTN, CVA   Interdisciplinary Rounds: did not attend  General Information Comments: Pt sleeping with sister at bedside. Reports that appetite is coming back and varies, but usually eats 50-75% of hospital meals. Would like to add Boost Glucose Control to aid in caloric intake. Denies N/V/D/C. States that appetite was normal PTA. Noted wt gain. NFPE not warranted. Pt appears nourished with age appropriate wasting.   Nutrition Discharge Planning: adequate po intake    Nutrition Risk Screen    Nutrition Risk Screen: no indicators present    Nutrition/Diet History    Spiritual, Cultural Beliefs, Uatsdin Practices, Values that Affect Care: no  Factors Affecting Nutritional Intake: decreased appetite    Anthropometrics    Temp: 97.3 °F (36.3 °C)  Height Method: Stated  Height: 5' 5" (165.1 cm)  Height (inches): 65 in  Weight Method: Standard Scale  Weight: 80.3 kg (177 lb)  Weight (lb): 177 lb  Ideal Body Weight (IBW), Female: 125 lb  % Ideal Body Weight, Female (lb): 141.6 lb  BMI (Calculated): 29.5  BMI Grade: 25 - 29.9 - overweight     Lab/Procedures/Meds    Pertinent Labs Reviewed: reviewed  Pertinent Labs Comments: GFR 54.4  Pertinent Medications Reviewed: reviewed  Pertinent Medications Comments: amlodipine, aspirin, statin, carvedilol, montelukast, pantoprazole, prednisone    Estimated/Assessed Needs    Weight Used For Calorie Calculations: 80.3 kg (177 lb 0.5 oz)  Energy Calorie " Requirements (kcal): 1606 kcal/day  Energy Need Method: Kcal/kg(20)  Protein Requirements: 80-96 gm/day(1.0-1.2 gm/kg)  Weight Used For Protein Calculations: 80.3 kg (177 lb 0.5 oz)  Fluid Requirements (mL): 1 mL/kcal or per MD     RDA Method (mL): 1606    Nutrition Prescription Ordered    Current Diet Order: Regular    Evaluation of Received Nutrient/Fluid Intake    Tolerance: tolerating  % Intake of Estimated Energy Needs: 50 - 75 %  % Meal Intake: 50 - 75 %    Nutrition Risk    Level of Risk/Frequency of Follow-up: (f/u 1 x wk)     Assessment and Plan    Nutrition Problem  Inadequate Oral Intake    Related to (etiology):   Decreased appetite     Signs and Symptoms (as evidenced by):   Pt eating <75% of meals     Interventions:  Collaboration of nutrition care with other providers    Nutrition Diagnosis Status:   New     Monitor and Evaluation    Food and Nutrient Intake: energy intake, food and beverage intake  Food and Nutrient Adminstration: diet order  Physical Activity and Function: nutrition-related ADLs and IADLs  Anthropometric Measurements: weight, weight change, body mass index  Biochemical Data, Medical Tests and Procedures: electrolyte and renal panel, gastrointestinal profile, glucose/endocrine profile, inflammatory profile, lipid profile  Nutrition-Focused Physical Findings: overall appearance     Nutrition Follow-Up    RD Follow-up?: Yes

## 2019-09-05 NOTE — PT/OT/SLP PROGRESS
"Speech Language Pathology Treatment & Discharge Planning    Patient Name:  Selma Lux MD   MRN:  [id number]  Admitting Diagnosis: Acute encephalopathy    Recommendations:                 General Recommendations:  Cognitive-linguistic therapy  Diet recommendations:  Regular, Liquid Diet Level: Thin   Aspiration Precautions: 1 bite/sip at a time, Feed only when awake/alert, HOB to 90 degrees, Monitor for s/s of aspiration and Strict aspiration precautions   General Precautions: Standard, aspiration, fall  Communication strategies:  go to room if call light pushed    Subjective     Pt presents calm  She explains,"I'm just waiting on my daughter to come get me"    Pain/Comfort:  ·  Pt denies pain    Objective:     Has the patient been evaluated by SLP for swallowing?   Yes  Keep patient NPO? No   Current Respiratory Status: room air      Pt seen for speech therapy. She was found awake and alert, sitting on sofa by window in room. She was oriented x4 and demonstrated appropriate recall of general events of the day (meals, discharge plans.)  Pt demonstrated appropriate response time and turn taking at the conversational level.  No word-finding difficulty noted with conversational speech tasks this service day.  SLP reviewed ongoing safety precautions and recommendations for f/u ST upon d/c. Pt verbalized understanding. Daughter in room to  Patient as SLP discontinued session. No additional questions noted. Whiteboard current.     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  She presents with improved response time and word-finding this service day.    Goals:   Multidisciplinary Problems     SLP Goals        Problem: SLP Goal    Goal Priority Disciplines Outcome   SLP Goal     SLP Ongoing (interventions implemented as appropriate)   Description:  Speech Language Pathology Goals  Goals expected to be met by 9/10/19  1. Pt will Ox3 " given min A/external aids.   2. Pt will complete mod complex comprehension tasks with 80% accy.  3. Pt will complete further assessment of mod complex word finding to determine need for tx.    4. Pt will participate in ongoing swallow assessment                           Plan:     · Patient to be seen:  3 x/week   · Plan of Care expires:  09/28/19  · Plan of Care reviewed with:  patient   · SLP Follow-Up:  No(pt with anticiapted d/c this service day )       Discharge recommendations:  nursing facility, skilled     Time Tracking:     SLP Treatment Date:   09/05/19  Speech Start Time:  1644  Speech Stop Time:  1652     Speech Total Time (min):  8 min    Billable Minutes: Seld Care/Home Management Training 8    LENORA Ray., [facility]-SLP  Speech-Language Pathology  Pager: [phone]      09/05/2019

## 2019-09-05 NOTE — PLAN OF CARE
Hospital day # 9. Numerous SNF referrals sent. Waiting for approval. EBONIE/NATALIA will continue to follow.        09/05/19 0804   Right Care Assessment   Can the patient answer the patient profile reliably? No, cognitively impaired   How often would a person be available to care for the patient? Whenever needed   Describe the patient's ability to walk at the present time. Major restrictions/daily assistance from another person   How does the patient rate their overall health at the present time? Fair   Number of comorbid conditions (as recorded on the chart) Five or more

## 2019-09-06 ENCOUNTER — PATIENT OUTREACH (OUTPATIENT)
Dept: HOME HEALTH SERVICES | Facility: HOSPITAL | Age: 84
End: 2019-09-06

## 2019-09-06 NOTE — PROGRESS NOTES
Verified that patient arrived to SNF unit without incident with all needed supplies. Advised that clinical updated will be requested Friday 9/13/2019.

## 2019-09-06 NOTE — PT/OT/SLP DISCHARGE
Occupational Therapy Discharge Summary    Selma Alonzo Lux MD  MRN: [id number]   Principal Problem: Acute encephalopathy      Patient Discharged from acute Occupational Therapy on 9/5/19.  Please refer to prior OT note dated 9/4/19 for functional status.    Assessment:      Patient appropriate for care in another setting.    Objective:     GOALS:   Multidisciplinary Problems     Occupational Therapy Goals        Problem: Occupational Therapy Goal    Goal Priority Disciplines Outcome Interventions   Occupational Therapy Goal     OT, PT/OT Ongoing (interventions implemented as appropriate)    Description:  Updated Goals to be met by: 7 days (9/11/19)     Patient will increase functional independence with ADLs by performing:    UE Dressing with Supervision.  LE Dressing with Stand-by Assistance.  Grooming while standing at sink with Stand-by Assistance.  Toileting from toilet with Stand-by Assistance for hygiene and clothing management.   Supine to sit with Supervision.  Toilet transfer to toilet with Stand-by Assistance.  Complete functional mobility household distance with SBA using AD as needed.    Goals to be met by: 7 days (9/2/19)     Patient will increase functional independence with ADLs by performing:    UE Dressing with Supervision.  LE Dressing with Stand-by Assistance.  Grooming while standing at sink with Stand-by Assistance.  Toileting from toilet with Stand-by Assistance for hygiene and clothing management.   Supine to sit with Stand-by Assistance.  Toilet transfer to toilet with Stand-by Assistance.  Complete functional mobility household distance with SBA using AD as needed.                       Reasons for Discontinuation of Therapy Services  Transfer to alternate level of care.      Plan:     Patient Discharged to: Skilled Nursing Facility    AYAD Michaud  9/6/2019

## 2019-09-13 ENCOUNTER — PATIENT OUTREACH (OUTPATIENT)
Dept: HOME HEALTH SERVICES | Facility: HOSPITAL | Age: 84
End: 2019-09-13

## 2019-09-13 NOTE — PROGRESS NOTES
9/13/2019    PT/OT/ST    *Transfer mobility (eg, from bed to chair)   Independent (patient can perform an activity safely and independently without any devices, physical assistance, or supervision)  Modified independent (patient requires the use of an assistive device or extra time to complete an activity, but is otherwise independent)  X    Contact guard assistance (patient can perform an activity independently, but needs constant physical touch to steady or guide to ensure safe performance)  Supervision (patient can perform an activity, but supervision is provided to address safety concerns)  Minimum assistance (patient can perform most of an activity (ie, approximately 75%), but requires limited assistance from one person for safe completion)  Moderate assistance (patient can perform about half the effort of an activity, but requires extensive assistance from one or more persons for safe performance of the other half)  Maximum assistance (patient has difficulty performing an activity, but can offer some assistance (eg, approximately 25% of effort) and is dependent on one or more people to assist for safe completion)  Dependent or unable (patient is unable to perform an activity or depends on 2 or more people to complete it)    *Ambulation inside (eg, within room, hallways, to toilet) 250 Ft  Independent  Modified independent  X    Contact guard assistance  Supervision  Minimum assistance  Moderate assistance  Maximum assistance  Dependent or unable    *Ambulation outside (eg, getting in or out of buildings, walking on uneven surfaces)   Independent  Modified independent  Contact guard assistance  Supervision  Minimum assistance  Moderate assistance  Maximum assistance  X     Dependent or unable    *Toileting self-management:   Independent  Modified independent  Contact guard assistance  Supervision  X     Minimum assistance  Moderate assistance  Maximum assistance  Dependent or unable    *Nourishment  self-management (ie, ability to feed self)   Independent  Modified independent  Supervision  X     Minimum assistance  Moderate assistance  Maximum assistance  Dependent or unable    *Personal care self-management (eg, dressing, bathing, brushing teeth, washing hair)   Independent  Modified independent  X     Contact guard assistance  Supervision  Minimum assistance  Moderate assistance  Maximum assistance  Dependent or unable    Medication support (ie, preparing/taking all prescribed and over-the-counter medications reliably and safely, including correct dosage at correct times)   Not applicable  Independent  Modified independent  Supervision  Minimum assistance  X    Moderate assistance  Maximum assistance    ADL adaptive devices self-management (ie, ability to manage putting on and/or removing braces, splints, and other adaptive devices)   Not applicable  Independent  Modified independent  Supervision  Minimum assistance  Moderate assistance  Maximum assistance  X    Dependent or unable          For Extensions  - Unanticipated functional problems impacting safe completion of therapy  - Multiple comorbidities or frailty impairing functional improvement  - Cognitive impairment requiring additional intervention and education  - Communication impairment requiring additional intervention and education  - Assessment or care unmanageable at lower level of care  - Expect brief to moderate stay extension.        Barriers to progress:  moderate assistance with memory and problem solving and will require assist for safety with all activities.     New treatments: None    Projected length of stay/ expected discharge: 9/25/2019    Discharge Disposition:home with family    Recommendations: 24 hour supervision

## 2019-09-17 ENCOUNTER — OFFICE VISIT (OUTPATIENT)
Dept: RHEUMATOLOGY | Facility: CLINIC | Age: 84
End: 2019-09-17
Payer: MEDICARE

## 2019-09-17 VITALS
DIASTOLIC BLOOD PRESSURE: 74 MMHG | WEIGHT: 181 LBS | BODY MASS INDEX: 30.16 KG/M2 | HEIGHT: 65 IN | HEART RATE: 82 BPM | SYSTOLIC BLOOD PRESSURE: 142 MMHG

## 2019-09-17 DIAGNOSIS — M16.4 POST-TRAUMATIC OSTEOARTHRITIS OF BOTH HIPS: ICD-10-CM

## 2019-09-17 DIAGNOSIS — J84.116 CRYPTOGENIC ORGANIZING PNEUMONIA: ICD-10-CM

## 2019-09-17 DIAGNOSIS — M05.79 SEROPOSITIVE RHEUMATOID ARTHRITIS OF MULTIPLE SITES: Primary | ICD-10-CM

## 2019-09-17 PROCEDURE — 99214 OFFICE O/P EST MOD 30 MIN: CPT | Mod: S$PBB,,, | Performed by: INTERNAL MEDICINE

## 2019-09-17 PROCEDURE — 99999 PR PBB SHADOW E&M-EST. PATIENT-LVL III: CPT | Mod: PBBFAC,,, | Performed by: INTERNAL MEDICINE

## 2019-09-17 PROCEDURE — 99214 PR OFFICE/OUTPT VISIT, EST, LEVL IV, 30-39 MIN: ICD-10-PCS | Mod: S$PBB,,, | Performed by: INTERNAL MEDICINE

## 2019-09-17 PROCEDURE — 99999 PR PBB SHADOW E&M-EST. PATIENT-LVL III: ICD-10-PCS | Mod: PBBFAC,,, | Performed by: INTERNAL MEDICINE

## 2019-09-17 PROCEDURE — 99213 OFFICE O/P EST LOW 20 MIN: CPT | Mod: PBBFAC | Performed by: INTERNAL MEDICINE

## 2019-09-17 RX ORDER — PREDNISONE 5 MG/1
20 TABLET ORAL DAILY
COMMUNITY
End: 2019-11-13 | Stop reason: SDUPTHER

## 2019-09-17 RX ORDER — VALACYCLOVIR HYDROCHLORIDE 1 G/1
1000 TABLET, FILM COATED ORAL 2 TIMES DAILY
COMMUNITY
End: 2019-09-17

## 2019-09-17 RX ORDER — DIAZEPAM 2 MG/1
2 TABLET ORAL EVERY 6 HOURS PRN
COMMUNITY
End: 2019-09-17

## 2019-09-17 RX ORDER — GABAPENTIN 100 MG/1
300 CAPSULE ORAL 3 TIMES DAILY
COMMUNITY
End: 2019-09-17

## 2019-09-17 RX ORDER — SULFASALAZINE 500 MG/1
500 TABLET ORAL 2 TIMES DAILY
COMMUNITY
End: 2019-09-17

## 2019-09-17 ASSESSMENT — ROUTINE ASSESSMENT OF PATIENT INDEX DATA (RAPID3)
TOTAL RAPID3 SCORE: 3.5
MDHAQ FUNCTION SCORE: .9
PATIENT GLOBAL ASSESSMENT SCORE: 3.5
AM STIFFNESS SCORE: 0, NO
PAIN SCORE: 4
FATIGUE SCORE: 4.5
PSYCHOLOGICAL DISTRESS SCORE: 3.3

## 2019-09-17 NOTE — ASSESSMENT & PLAN NOTE
Now appears stable on MMF as a steroid sparing drug  Will try to reduce prednisone to 20 mg/d while she is still at SNF and is being observed

## 2019-09-17 NOTE — PROGRESS NOTES
"Subjective:       Patient ID: Selma Rosado MD Jose J is a 83 y.o. female.    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 Negative for fever and unexpected weight change.   HENT: Negative for trouble swallowing.    Eyes: Positive for redness.   Respiratory: Positive for shortness of breath. Negative for cough.    Cardiovascular: Negative for chest pain.   Gastrointestinal: Negative for constipation and diarrhea.   Genitourinary: Negative for dysuria and genital sores.   Skin: Negative for rash.   Neurological: Positive for headaches.   Hematological: Does not bruise/bleed easily.         Objective:   BP (!) 142/74   Pulse 82   Ht 5' 5" (1.651 m)   Wt 82.1 kg (181 lb)   LMP  (LMP Unknown)   BMI 30.12 kg/m²      Physical Exam   Constitutional: She is well-developed, well-nourished, and in no distress.   HENT:   Mouth/Throat: Oropharynx is clear and moist.   Eyes: Conjunctivae are normal.   Cardiovascular: Normal rate and regular rhythm.    Pulmonary/Chest: She has no wheezes. She has no rales.   A few crackles L base posteriorly   Lymphadenopathy:     She has no cervical adenopathy.   Skin: No rash noted. "     Musculoskeletal:   No synovitis          Physical Exam  Lab Results   Component Value Date    SEDRATE 5 05/21/2019     Lab Results   Component Value Date    WBC 11.14 09/05/2019    HGB 11.8 (L) 09/05/2019    HCT 37.9 09/05/2019    MCV 88 09/05/2019     09/05/2019       Assessment:       1. Seropositive rheumatoid arthritis of multiple sites    2. Cryptogenic organizing pneumonia    3. Post-traumatic osteoarthritis of both hips            Plan:       Problem List Items Addressed This Visit        Active Problems    Seropositive rheumatoid arthritis of multiple sites - Primary     RA currently controlled on prednisone plus MMF plus HCQ         Relevant Orders    C-reactive protein    Sedimentation rate    Cryptogenic organizing pneumonia     Now appears stable on MMF as a steroid sparing drug  Will try to reduce prednisone to 20 mg/d while she is still at [facility] and is being observed         Relevant Orders    C-reactive protein    Sedimentation rate    Post-traumatic osteoarthritis of both hips     Symptomatic secondary OA of hip  Tylenol, heat, stretch           Schedule lab in December for MMF monitoring

## 2019-09-17 NOTE — PROGRESS NOTES
Rapid3 Question Responses and Scores 9/13/2019   MDHAQ Score 0.9   Psychologic Score 3.3   Pain Score 4   When you awakened in the morning OVER THE LAST WEEK, did you feel stiff? No   Fatigue Score 4.5   Global Health Score 3.5   RAPID3 Score 3.5

## 2019-09-20 ENCOUNTER — PATIENT OUTREACH (OUTPATIENT)
Dept: HOME HEALTH SERVICES | Facility: HOSPITAL | Age: 84
End: 2019-09-20

## 2019-09-20 ENCOUNTER — OUTPATIENT CASE MANAGEMENT (OUTPATIENT)
Dept: ADMINISTRATIVE | Facility: OTHER | Age: 84
End: 2019-09-20

## 2019-09-20 NOTE — PROGRESS NOTES
PT/OT/ST    *Transfer mobility (eg, from bed to chair)   Independent (patient can perform an activity safely and independently without any devices, physical assistance, or supervision)  Modified independent (patient requires the use of an assistive device or extra time to complete an activity, but is otherwise independent)  Contact guard assistance (patient can perform an activity independently, but needs constant physical touch to steady or guide to ensure safe performance)  X    Supervision (patient can perform an activity, but supervision is provided to address safety concerns)  Minimum assistance (patient can perform most of an activity (ie, approximately 75%), but requires limited assistance from one person for safe completion)  Moderate assistance (patient can perform about half the effort of an activity, but requires extensive assistance from one or more persons for safe performance of the other half)  Maximum assistance (patient has difficulty performing an activity, but can offer some assistance (eg, approximately 25% of effort) and is dependent on one or more people to assist for safe completion)  Dependent or unable (patient is unable to perform an activity or depends on 2 or more people to complete it)    *Ambulation inside (eg, within room, hallways, to toilet) _250____Ft  Independent  Modified independent  X    Contact guard assistance  Supervision  Minimum assistance  Moderate assistance  Maximum assistance  Dependent or unable    *Ambulation outside (eg, getting in or out of buildings, walking on uneven surfaces) ______Ft  Independent  Modified independent  Contact guard assistance  Supervision  Minimum assistance  Moderate assistance  Maximum assistance  Dependent or unable    *Toileting self-management:   Independent  Modified independent  X    Contact guard assistance  Supervision  Minimum assistance  Moderate assistance  Maximum assistance  Dependent or unable    *Nourishment self-management  (ie, ability to feed self)   Independent  Modified independent  X    Supervision  Minimum assistance  Moderate assistance  Maximum assistance  Dependent or unable    *Personal care self-management (eg, dressing, bathing, brushing teeth, washing hair)   Independent  Modified independent  Contact guard assistance  X     Supervision  Minimum assistance  Moderate assistance  Maximum assistance  Dependent or unable    Medication support (ie, preparing/taking all prescribed and over-the-counter medications reliably and safely, including correct dosage at correct times)   Not applicable  Independent  Modified independent  Supervision  Minimum assistance  X    Moderate assistance  Maximum assistance    ADL adaptive devices self-management (ie, ability to manage putting on and/or removing braces, splints, and other adaptive devices)   Not applicable  Independent  Modified independent  Supervision  Minimum assistance  Moderate assistance  Maximum assistance  Dependent or unable          For Extensions  - Unanticipated functional problems impacting safe completion of therapy  - Multiple comorbidities or frailty impairing functional improvement  - Cognitive impairment requiring additional intervention and education  - Communication impairment requiring additional intervention and education  - Assessment or care unmanageable at lower level of care  - Expect brief to moderate stay extension.        Barriers to progress: Continues with memory, problem solving and safety deficits    New treatments:    Projected length of stay/expected discharge:  unknown    Discharge Disposition: Home with family. Therapy recommending 24h supervision due to memory and safety concerns.  Recommendations:

## 2019-09-24 RX ORDER — FUROSEMIDE 40 MG/1
TABLET ORAL
Qty: 60 TABLET | Refills: 0 | Status: SHIPPED | OUTPATIENT
Start: 2019-09-24 | End: 2019-10-17 | Stop reason: SDUPTHER

## 2019-09-25 NOTE — TELEPHONE ENCOUNTER
Pt will call back Monday when she knows how her schedule will work in order for the follow up appt    Patient/Caregiver provided printed discharge information.

## 2019-09-30 ENCOUNTER — EXTERNAL CHRONIC CARE MANAGEMENT (OUTPATIENT)
Dept: PRIMARY CARE CLINIC | Facility: CLINIC | Age: 84
End: 2019-09-30
Payer: MEDICARE

## 2019-09-30 ENCOUNTER — PATIENT OUTREACH (OUTPATIENT)
Dept: HOME HEALTH SERVICES | Facility: HOSPITAL | Age: 84
End: 2019-09-30

## 2019-09-30 PROCEDURE — 99490 PR CHRONIC CARE MGMT, 1ST 20 MIN: ICD-10-PCS | Mod: S$PBB,,, | Performed by: FAMILY MEDICINE

## 2019-09-30 PROCEDURE — 99490 CHRNC CARE MGMT STAFF 1ST 20: CPT | Mod: S$PBB,,, | Performed by: FAMILY MEDICINE

## 2019-09-30 PROCEDURE — 99490 CHRNC CARE MGMT STAFF 1ST 20: CPT | Mod: PBBFAC | Performed by: FAMILY MEDICINE

## 2019-09-30 NOTE — PROGRESS NOTES
PT/OT/ST    *Transfer mobility (eg, from bed to chair)   Independent (patient can perform an activity safely and independently without any devices, physical assistance, or supervision)  Modified independent (patient requires the use of an assistive device or extra time to complete an activity, but is otherwise independent)  Contact guard assistance (patient can perform an activity independently, but needs constant physical touch to steady or guide to ensure safe performance)  X    Supervision (patient can perform an activity, but supervision is provided to address safety concerns)  Minimum assistance (patient can perform most of an activity (ie, approximately 75%), but requires limited assistance from one person for safe completion)  Moderate assistance (patient can perform about half the effort of an activity, but requires extensive assistance from one or more persons for safe performance of the other half)  Maximum assistance (patient has difficulty performing an activity, but can offer some assistance (eg, approximately 25% of effort) and is dependent on one or more people to assist for safe completion)  Dependent or unable (patient is unable to perform an activity or depends on 2 or more people to complete it)    *Ambulation inside (eg, within room, hallways, to toilet) 250_____Ft  Independent  Modified independent  X    Contact guard assistance  Supervision  Minimum assistance  Moderate assistance  Maximum assistance  Dependent or unable    *Ambulation outside (eg, getting in or out of buildings, walking on uneven surfaces) __Ft  Independent  Modified independent  Contact guard assistance  Supervision  Minimum assistance  Moderate assistance  Maximum assistance  X    Dependent or unable    *Toileting self-management:   Independent  Modified independent  Contact guard assistance  X    Supervision  Minimum assistance  Moderate assistance  Maximum assistance  Dependent or unable    *Nourishment self-management  (ie, ability to feed self)   Independent  Modified independent  X    Supervision  Minimum assistance  Moderate assistance  Maximum assistance  Dependent or unable    *Personal care self-management (eg, dressing, bathing, brushing teeth, washing hair)   Independent  Modified independent  Contact guard assistance  X     Supervision  Minimum assistance  Moderate assistance  Maximum assistance  Dependent or unable    Medication support (ie, preparing/taking all prescribed and over-the-counter medications reliably and safely, including correct dosage at correct times)   Not applicable  Independent  Modified independent  Supervision  Minimum assistance  X    Moderate assistance  Maximum assistance    ADL adaptive devices self-management (ie, ability to manage putting on and/or removing braces, splints, and other adaptive devices)   Not applicable  Independent  Modified independent  Supervision  Minimum assistance  Moderate assistance  Maximum assistance  X    Dependent or unable          For Extensions  - Unanticipated functional problems impacting safe completion of therapy  - Multiple comorbidities or frailty impairing functional improvement  - Cognitive impairment requiring additional intervention and education  - Communication impairment requiring additional intervention and education  - Assessment or care unmanageable at lower level of care  - Expect brief to moderate stay extension.        Barriers to progress: Poor safety and high falls risks    New treatments:    Projected length of stay/ expected discharge:DC planning with family week of 9/30    Discharge Disposition: Home with    Recommendations: 24 hour supervision

## 2019-10-02 DIAGNOSIS — G24.3 CERVICAL DYSTONIA: Primary | ICD-10-CM

## 2019-10-03 NOTE — PLAN OF CARE
08/29/19 1503   Post-Acute Status   Post-Acute Authorization Home Health/Hospice   Home Health/Hospice Status Awaiting Internal Medical Clearance      Chief complaint:   Chief Complaint   Patient presents with   • Back Problem       Vitals:  Blood pressure 152/90, pulse 102, temperature 98.2 °F (36.8 °C), temperature source Temporal, resp. rate 18, height 5' 1\" (1.549 m), weight 85.8 kg, SpO2 97 %.      HISTORY OF PRESENT ILLNESS     Patient with sciatic back pain, started last week.  Sciatic back pain that radiates from back and down right leg. History of similar symptoms that improved with physical therapy. Tried Yoga ball, which improved symptoms.       Patient eating a lot of almonds and pistachios, then developed diarrhea, decreased appetite.  Patient went on liquid diet x 4 days.  Now bowel movements are normal, but having mild pain in right lower quadrant.  Denies fever/chills or bloody stools. Patient with a history of diverticulosis.   See ROS for pertinent other positive and negative complaints.       Other significant problems:  Patient Active Problem List    Diagnosis Date Noted   • Atypical chest pain 01/24/2017     Priority: Medium   • Varicose veins of both lower extremities with pain      Priority: Low   • RAD (reactive airway disease)      Priority: Low   • Sciatica 03/11/2018     Priority: Low   • Mild sleep apnea      Priority: Low   • Dyslipidemia 02/10/2017     Priority: Low   • Essential (primary) hypertension      Priority: Low     hx of htn- has been off her losartan for over a month and bp has been fine     • Hidradenitis suppurativa      Priority: Low   • Osteopenia 01/31/2015     Priority: Low   • Pineal gland cyst 01/31/2015     Priority: Low     On MRI 2008; 8mm     • Achilles tendinitis 01/31/2015     Priority: Low   • Vitamin D deficiency 11/12/2014     Priority: Low   • GERD (gastroesophageal reflux disease) 07/10/2014     Priority: Low   • Liver cyst 07/10/2014     Priority: Low   • Pain in joint, multiple sites 03/12/2014     Priority: Low   • Lumbar disc disease 03/12/2014     Priority: Low   • Serum calcium elevated 03/12/2014      Priority: Low   • Generalized osteoarthrosis, involving multiple sites 07/10/2013     Priority: Low   • Bursitis, foot 07/10/2013     Priority: Low   • Numbness and tingling in hands 05/07/2013     Priority: Low   • Numbness in feet 05/07/2013     Priority: Low   • Mild intermittent asthma without complication 02/20/2013     Priority: Low   • Obesity, unspecified 02/20/2013     Priority: Low       PAST MEDICAL, FAMILY AND SOCIAL HISTORY     Medications:  Current Outpatient Medications   Medication   • lisinopril (ZESTRIL) 10 MG tablet   • clindamycin (CLINDAGEL) 1 % gel   • albuterol (VENTOLIN HFA) 108 (90 Base) MCG/ACT inhaler   • esomeprazole (NEXIUM) 20 MG capsule   • nystatin (MYCOSTATIN) 817186 UNIT/GM powder   • hydroCORTisone valerate (WESTCORT) 0.2 % cream   • Probiotic Product (FLORAJEN3) capsule   • ibuprofen (MOTRIN) 800 MG tablet   • polyethylene glycol (MIRALAX) powder   • CRANBERRY PO   • LORATADINE PO     No current facility-administered medications for this visit.        Allergies:  ALLERGIES:   Allergen Reactions   • Sulfa Antibiotics ANAPHYLAXIS   • Dicloxacillin    • Golytely [Peg 3350-Kcl-Nabcb-Nacl-Nasulf]    • Penicillins RASH   • Percocet [Oxycodone-Acetaminophen] Palpitations   • Vicodin [Hydrocodone-Acetaminophen] Palpitations       Past Medical  History/Surgeries:  Past Medical History:   Diagnosis Date   • Allergy    • Arthritis    • Essential (primary) hypertension     hx of htn- has been off her losartan for over a month and bp has been fine   • GERD (gastroesophageal reflux disease) 7/10/2014   • Hx Abdominal pain, generalized 02/20/2013   • Hx Hidradenitis suppurativa    • Hyperparathyroidism (CMS/HCC)    • Lumbar disc disease 3/12/2014   • Mild sleep apnea    • Obesity, unspecified 2/20/2013   • Osteopenia 1/31/2015   • Pineal gland cyst 9/2008    History of 8mm pineal cyst seen on MRI 9/2008.     • PONV (postoperative nausea and vomiting)    • RAD (reactive airway disease)    •  Unspecified asthma(493.90) 2/20/2013   • UTI (urinary tract infection)    • Varicose veins of both lower extremities with pain    • Vitamin D deficiency 1/31/2015       Past Surgical History:   Procedure Laterality Date   • Appendectomy     • Bladder suspension     • Colonoscopy diagnostic  10/01/2008    Colonoscopy, Dx   • Dexa bone density axial skeleton  03/15/2006   • Hysterectomy  05/13/2010   • Ovary surgery  8/2012    tumor removed   • Removal of tonsils,<13 y/o      Tonsillectomy Alone   • Tubal ligation         Family History:  Family History   Problem Relation Age of Onset   • Cancer Mother 56        Lung cancer   • Kidney disease Father         CKD and CHF   • Heart disease Father    • Cancer Daughter 38        Breast-ductal   • Cancer Sister 41        Breast   • Cancer Paternal Grandmother 56        Breast        Social History:  Social History     Tobacco Use   • Smoking status: Never Smoker   • Smokeless tobacco: Never Used   Substance Use Topics   • Alcohol use: Yes     Comment: rare       REVIEW OF SYSTEMS     Review of Systems   Constitutional: Negative for chills and fever.   Gastrointestinal: Positive for abdominal pain (RLQ) and diarrhea (now resolved). Negative for blood in stool, constipation, nausea and vomiting.        No fecal incontinence.   Genitourinary:        No urinary incontinence.   Musculoskeletal: Positive for back pain.   Skin:        +skin lesion with pain, ? changes   Neurological: Negative for numbness (no saddle anesthesia).       PHYSICAL EXAM     Physical Exam   Constitutional: She is oriented to person, place, and time. She appears well-developed and well-nourished.   HENT:   Head: Normocephalic and atraumatic.   Eyes: Conjunctivae and EOM are normal.   Cardiovascular: Normal rate, regular rhythm and normal heart sounds.   Pulmonary/Chest: Effort normal and breath sounds normal.   Abdominal: Soft. Normal appearance and bowel sounds are normal. There is tenderness in the  right lower quadrant. There is no rigidity, no rebound and no guarding.     Musculoskeletal: Normal range of motion.         General: No edema.      Lumbar back: She exhibits tenderness (right buttock). She exhibits no bony tenderness and no spasm.        Back:      Neurological: She is alert and oriented to person, place, and time. She has normal reflexes.   Skin: No rash noted.        Psychiatric: She has a normal mood and affect. Her behavior is normal. Judgment and thought content normal.   Nursing note and vitals reviewed.      ASSESSMENT/PLAN     1. Lumbar back pain-patient would like to return for physical therapy (has helped with pain in past)  - SERVICE TO PHYSICAL THERAPY    2. Change in color of pigmented skin lesion-would recommend patient follow-up with dermatology to have unusual skin lesion evaluated.    3. Right lower quadrant abdominal pain  4. History of diverticulosis-patient may have had mild flare of diverticulitis, that resolved with liquid diet.  Patient continues to have mild residual tenderness and right lower quadrant, but no guarding or rebound. Stools have returned to normal. No fever or chills. No bloody stools. Discussed avoiding eating almonds or other nuts for next few weeks, and should probably limit quantity of nuts in the future.  However, discussed if symptoms persist, or worsen to seek immediate care.      Chyna Quinonez MD  Board Certified Family Medicine and Integrative Medicine  [address] [place] [address]  Office:  [phone]

## 2019-10-04 NOTE — PROGRESS NOTES
Pt. Has increasingly become more restless, tachypneic, and hyptertensive. Repositioned pt., suctioned pt., and PRN hydralazine given, but did not lower SBP or make pt. more restful.  Called CC team. Orders received. Will continue to monitor.   

## 2019-10-07 ENCOUNTER — PATIENT OUTREACH (OUTPATIENT)
Dept: HOME HEALTH SERVICES | Facility: HOSPITAL | Age: 84
End: 2019-10-07

## 2019-10-07 PROCEDURE — G0180 PR HOME HEALTH MD CERTIFICATION: ICD-10-PCS | Mod: ,,, | Performed by: FAMILY MEDICINE

## 2019-10-07 PROCEDURE — G0180 MD CERTIFICATION HHA PATIENT: HCPCS | Mod: ,,, | Performed by: FAMILY MEDICINE

## 2019-10-07 NOTE — PROGRESS NOTES
10/4/2019    PT/OT/ST    *Transfer mobility (eg, from bed to chair)   X     Independent (patient can perform an activity safely and independently without any devices, physical assistance, or supervision)  Modified independent (patient requires the use of an assistive device or extra time to complete an activity, but is otherwise independent)  Contact guard assistance (patient can perform an activity independently, but needs constant physical touch to steady or guide to ensure safe performance)  Supervision (patient can perform an activity, but supervision is provided to address safety concerns)  Minimum assistance (patient can perform most of an activity (ie, approximately 75%), but requires limited assistance from one person for safe completion)  Moderate assistance (patient can perform about half the effort of an activity, but requires extensive assistance from one or more persons for safe performance of the other half)  Maximum assistance (patient has difficulty performing an activity, but can offer some assistance (eg, approximately 25% of effort) and is dependent on one or more people to assist for safe completion)  Dependent or unable (patient is unable to perform an activity or depends on 2 or more people to complete it)    *Ambulation inside (eg, within room, hallways, to toilet) 250 Ft  Independent  Modified independent  Contact guard assistance  Supervision  Minimum assistance  Moderate assistance  Maximum assistance  Dependent or unable    *Ambulation outside (eg, getting in or out of buildings, walking on uneven surfaces) ______Ft  Independent  Modified independent  Contact guard assistance  Supervision  Minimum assistance  Moderate assistance  Maximum assistance  X     Dependent or unable    *Toileting self-management:   Independent  Modified independent  Contact guard assistance  X    Supervision  Minimum assistance  Moderate assistance  Maximum assistance  Dependent or unable    *Nourishment  self-management (ie, ability to feed self)   Independent  Modified independent  X    Supervision  Minimum assistance  Moderate assistance  Maximum assistance  Dependent or unable    *Personal care self-management (eg, dressing, bathing, brushing teeth, washing hair)   Independent  Modified independent  Contact guard assistance  X    Supervision  Minimum assistance  Moderate assistance  Maximum assistance  Dependent or unable    Medication support (ie, preparing/taking all prescribed and over-the-counter medications reliably and safely, including correct dosage at correct times)   Not applicable  Independent  Modified independent  Supervision  Minimum assistance  X    Moderate assistance  Maximum assistance    ADL adaptive devices self-management (ie, ability to manage putting on and/or removing braces, splints, and other adaptive devices)   X    Not applicable  Independent  Modified independent  Supervision  Minimum assistance  Moderate assistance  Maximum assistance  Dependent or unable          For Extensions  - Unanticipated functional problems impacting safe completion of therapy  - Multiple comorbidities or frailty impairing functional improvement  - Cognitive impairment requiring additional intervention and education  - Communication impairment requiring additional intervention and education  - Assessment or care unmanageable at lower level of care  - Expect brief to moderate stay extension.        Barriers to progress:  Cues for safety and memory   New treatments:    Projected length of stay/ expected discharge: 10/5/2019    Discharge Disposition:  Home with family  Recommendations: 24h supervision

## 2019-10-14 ENCOUNTER — PATIENT OUTREACH (OUTPATIENT)
Dept: ADMINISTRATIVE | Facility: OTHER | Age: 84
End: 2019-10-14

## 2019-10-15 ENCOUNTER — PATIENT OUTREACH (OUTPATIENT)
Dept: HOME HEALTH SERVICES | Facility: HOSPITAL | Age: 84
End: 2019-10-15

## 2019-10-15 ENCOUNTER — TELEPHONE (OUTPATIENT)
Dept: HOME HEALTH SERVICES | Facility: HOSPITAL | Age: 84
End: 2019-10-15

## 2019-10-15 ENCOUNTER — PATIENT OUTREACH (OUTPATIENT)
Dept: ADMINISTRATIVE | Facility: CLINIC | Age: 84
End: 2019-10-15

## 2019-10-17 ENCOUNTER — OFFICE VISIT (OUTPATIENT)
Dept: INTERNAL MEDICINE | Facility: CLINIC | Age: 84
End: 2019-10-17
Attending: FAMILY MEDICINE
Payer: MEDICARE

## 2019-10-17 ENCOUNTER — PROCEDURE VISIT (OUTPATIENT)
Dept: NEUROLOGY | Facility: CLINIC | Age: 84
End: 2019-10-17
Payer: MEDICARE

## 2019-10-17 ENCOUNTER — TELEPHONE (OUTPATIENT)
Dept: INTERNAL MEDICINE | Facility: CLINIC | Age: 84
End: 2019-10-17

## 2019-10-17 VITALS
HEART RATE: 71 BPM | DIASTOLIC BLOOD PRESSURE: 53 MMHG | OXYGEN SATURATION: 97 % | SYSTOLIC BLOOD PRESSURE: 114 MMHG | HEIGHT: 65 IN | BODY MASS INDEX: 28.91 KG/M2 | TEMPERATURE: 98 F | WEIGHT: 173.5 LBS

## 2019-10-17 VITALS
DIASTOLIC BLOOD PRESSURE: 66 MMHG | SYSTOLIC BLOOD PRESSURE: 128 MMHG | HEIGHT: 65 IN | BODY MASS INDEX: 28.82 KG/M2 | WEIGHT: 173 LBS | HEART RATE: 75 BPM

## 2019-10-17 DIAGNOSIS — I10 HYPERTENSION, ESSENTIAL: ICD-10-CM

## 2019-10-17 DIAGNOSIS — I27.23 PULMONARY HYPERTENSION DUE TO INTERSTITIAL LUNG DISEASE: ICD-10-CM

## 2019-10-17 DIAGNOSIS — J84.116 CRYPTOGENIC ORGANIZING PNEUMONIA: ICD-10-CM

## 2019-10-17 DIAGNOSIS — R53.81 DEBILITY: ICD-10-CM

## 2019-10-17 DIAGNOSIS — N18.30 CHRONIC RENAL FAILURE SYNDROME, STAGE 3 (MODERATE): ICD-10-CM

## 2019-10-17 DIAGNOSIS — R53.1 GENERALIZED WEAKNESS: ICD-10-CM

## 2019-10-17 DIAGNOSIS — I67.89 CEREBRAL MICROVASCULAR DISEASE: ICD-10-CM

## 2019-10-17 DIAGNOSIS — G24.9 DYSTONIA: ICD-10-CM

## 2019-10-17 DIAGNOSIS — G45.9 TIA (TRANSIENT ISCHEMIC ATTACK): Primary | ICD-10-CM

## 2019-10-17 DIAGNOSIS — M05.79 SEROPOSITIVE RHEUMATOID ARTHRITIS OF MULTIPLE SITES: ICD-10-CM

## 2019-10-17 DIAGNOSIS — E04.1 THYROID NODULE: ICD-10-CM

## 2019-10-17 DIAGNOSIS — E78.5 HYPERLIPIDEMIA, UNSPECIFIED HYPERLIPIDEMIA TYPE: ICD-10-CM

## 2019-10-17 DIAGNOSIS — D84.9 IMMUNOSUPPRESSED STATUS: ICD-10-CM

## 2019-10-17 DIAGNOSIS — J84.9 PULMONARY HYPERTENSION DUE TO INTERSTITIAL LUNG DISEASE: ICD-10-CM

## 2019-10-17 PROCEDURE — 64616 CHEMODENERV MUSC NECK DYSTON: CPT | Mod: PBBFAC | Performed by: PSYCHIATRY & NEUROLOGY

## 2019-10-17 PROCEDURE — 99999 PR PBB SHADOW E&M-EST. PATIENT-LVL IV: ICD-10-PCS | Mod: PBBFAC,,, | Performed by: FAMILY MEDICINE

## 2019-10-17 PROCEDURE — 99999 PR PBB SHADOW E&M-EST. PATIENT-LVL IV: CPT | Mod: PBBFAC,,, | Performed by: FAMILY MEDICINE

## 2019-10-17 PROCEDURE — 99214 PR OFFICE/OUTPT VISIT, EST, LEVL IV, 30-39 MIN: ICD-10-PCS | Mod: S$PBB,,, | Performed by: FAMILY MEDICINE

## 2019-10-17 PROCEDURE — 64616 PR CHEMODENERVATION NECK MUSCLES EXC LARNYNX, UNI: ICD-10-PCS | Mod: S$PBB,LT,, | Performed by: PSYCHIATRY & NEUROLOGY

## 2019-10-17 PROCEDURE — 99214 OFFICE O/P EST MOD 30 MIN: CPT | Mod: PBBFAC,25 | Performed by: FAMILY MEDICINE

## 2019-10-17 PROCEDURE — 64616 CHEMODENERV MUSC NECK DYSTON: CPT | Mod: S$PBB,LT,, | Performed by: PSYCHIATRY & NEUROLOGY

## 2019-10-17 PROCEDURE — 99214 OFFICE O/P EST MOD 30 MIN: CPT | Mod: S$PBB,,, | Performed by: FAMILY MEDICINE

## 2019-10-17 RX ORDER — FUROSEMIDE 40 MG/1
TABLET ORAL
Qty: 60 TABLET | Refills: 0 | Status: ON HOLD | OUTPATIENT
Start: 2019-10-17 | End: 2020-03-10 | Stop reason: SDUPTHER

## 2019-10-17 RX ORDER — AMLODIPINE BESYLATE 10 MG/1
10 TABLET ORAL DAILY
Start: 2019-10-17 | End: 2019-11-12 | Stop reason: SDUPTHER

## 2019-10-17 RX ORDER — BACLOFEN 10 MG/1
5 TABLET ORAL NIGHTLY
COMMUNITY
End: 2019-10-17 | Stop reason: ALTCHOICE

## 2019-10-17 RX ORDER — MONTELUKAST SODIUM 10 MG/1
10 TABLET ORAL NIGHTLY
Qty: 90 TABLET | Refills: 0 | Status: SHIPPED | OUTPATIENT
Start: 2019-10-17 | End: 2019-11-12 | Stop reason: SDUPTHER

## 2019-10-17 RX ORDER — CARVEDILOL 12.5 MG/1
12.5 TABLET ORAL 2 TIMES DAILY WITH MEALS
Qty: 180 TABLET | Refills: 3 | Status: SHIPPED | OUTPATIENT
Start: 2019-10-17 | End: 2020-02-05

## 2019-10-17 RX ORDER — GABAPENTIN 100 MG/1
200 CAPSULE ORAL NIGHTLY
COMMUNITY
Start: 2019-10-05 | End: 2019-10-17 | Stop reason: ALTCHOICE

## 2019-10-17 RX ORDER — ATORVASTATIN CALCIUM 40 MG/1
40 TABLET, FILM COATED ORAL DAILY
Qty: 90 TABLET | Refills: 3 | Status: SHIPPED | OUTPATIENT
Start: 2019-10-17 | End: 2019-11-12 | Stop reason: SDUPTHER

## 2019-10-17 RX ADMIN — ONABOTULINUMTOXINA 100 UNITS: 100 INJECTION, POWDER, LYOPHILIZED, FOR SOLUTION INTRADERMAL; INTRAMUSCULAR at 06:10

## 2019-10-17 NOTE — TELEPHONE ENCOUNTER
----- Message from Bhargav Lee MD sent at 10/17/2019  4:48 PM CDT -----  Please contact patient to schedule appointment with endocrinologist for thyroid nodule.    Please forward home health orders.

## 2019-10-17 NOTE — PROCEDURES
Procedures   BOTULINUM TOXIN PROCEDURE NOTE FOR CERVICAL DYSTONIA/ SPASMODIC TORTICOLLIS    Diagnosis: CERVICAL DYSTONIA/ SPASMODIC TORTICOLLIS  She has recurrence of pain, spasm and abnormal posturing over the past several weeks. She is experiencing a worsening of cervical dystonia/ spasmodic torticollis which interferes with activities of daily living (ADL).     Pain is largely left posterior neck, gives indication that it is down into the scapula.    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  Given the focal nature of increased tone and poor response to oral medications, She is a good candidate for botulinum injection to her neck.  She will need 200 units of toxin    The goal of the injections will be to reduce pain and abnormal posturing.  The procedure was explained to patient and a consent form was signed.      BOTOX INJECTION PROCEDURE:    Using a 30 gauge injection needle and aseptic technique the following muscles were identified and injected with botox .     Dilution: 100:1      For cervical dystonia/ spasmodic torticollis:      RIGHT    Muscle group Units given # injection sites   Splenius capitus      Levator scapulae     Upper trapezius     Sternocleidomastoid      Scalenus anticus      Scalenus medius     Scalenus posterior     Longissimus capitus      rhomboid     Semispinalis                    LEFT        Muscle group Units given # injection sites   Splenius capitus  25    Levator scapulae     Upper trapezius 25 - 25    Sternocleidomastoid      Scalenus anticus      Scalenus medius     Scalenus posterior     Longissimus capitus      rhomboid     Semispinalis 25            Total amount of botox used:  100 units  Total amount of botox wasted:  0 units    Patient tolerated the procedure without any difficulty and there were no complications.

## 2019-10-17 NOTE — TELEPHONE ENCOUNTER
Spoke with pt daughter Rosy informing that I have faxed the HH referral to [facility]    I also provided Ms. Gallegos with Endocr number to call for the appt.    Thank you

## 2019-10-17 NOTE — PROGRESS NOTES
Patient with a complicated medical history presenting for a post hospitalization follow-up.  She presents with her daughter who states she is doing better.  Needs home health face-to-face visit.  Orders are currently pending.  Receiving occupational therapy, physical therapy.  An a CNA twice weekly.    Patient states her primary diagnosis was TIA, the discharge summary emphasizes the .    Hospitalization discharge summary as below.  She was transferred to skilled nursing or nursing home care for approximately 5 weeks of rehabilitative care.  Those notes are not available to us.        HPI: 83 y.o. 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  She noted that she had a doctor's appointment earlier today and appeared normal, went home, ate a normal dinner, but then approximately 2 hr later at about 9:00 p.m. the patient shouted for help and her daughter found her in the hallway hunched over her walker.  She states that she appeared very weak, and when she sat her down on the toilet to rest the patient was unable to finish a sentence because of confusion, and then began to have slurred speech.  This whole episode lasted approximately 30 min, and while debating coming to the ED it resolved.  The daughter notes a previous episode similar to this or in December 2018, she was admitted with encephalopathy, there is concern about hypertensive emergency versus viral meningitis as a cause.  Patient has a long complicated medical history. She sees Dr Giang for cervical dystonia and Botox injections and Dr Tobias for memory loss. Upon examination but still slightly confused but moving all extremities.       Hospital Course: Patient was initially admitted to the Vascular Neurology service to rule out TIA.  MRI/MRA were negative for acute ischemia.  Patient underwent EEG which did not show epileptiform abnormalities. However,  according to Vascular Neurology, due to patient's prior episode with similar symptoms in December 2018, there is a concern for possible seizures.  Patient also has superimposed toxic encephalopathy from baclofen and gabapentin overuse.  Neurology consulted.     Acute toxic/metabolic Encephalopathy  -Etiology:  Infectious vs. Neurologic vs. Iatrogenic  -CT head, MRI/MRA brain nonacute.  Cerebral volume loss concerning for chronic ischemic changes  -EEG without any epileptiform activity, but does show slowing consistent with moderate encephalopathy  -Medication additions or changes could be affecting presentation, as patient was given multiple doses of baclofen and gabapentin.  Hold any sedating medications at this time.  -no antiepileptics for now  -consulted Neurology  -aspiration precautions, fall precautions, seizure precautions  -Continue correction of metabolic derangements  -Maintain Delirium precautions     Seropositive rheumatoid arthritis of multiple sites  Long-term use of immunosuppressant medication  -On home plaquenil, mycophenolate, and prednisone daily for RA  -follows with Dr. Rouse in Rheumatology  -hx of  due to RA     Essential hypertension  -stable  -Continue PTA  amlodipine, Coreg  -monitor vitals q4h  -SBP goal of <160 in hospital     Dystonia  -Follows with Dr. Giang of Neurology  -Held Gabapentin and Baclofen for now given AMS and potential for interaction     History of stroke  -Chronic and stable  -Continue Aspirin 81mg PO daily  -Continue Plavix 75mg PO daily     GERD:  -continue PPI     Hyperlipidemia:  -continue PTA atorvastatin 40 mg         Previous problems with Humira.  Current goal is apparently to add CellCept and decrease prednisone      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    Chief Complaint: Home Health Review    HPI  Review of Systems   Constitutional: Positive for activity change. Negative for chills, fatigue, fever and unexpected  weight change.   HENT: Negative for congestion, hearing loss, rhinorrhea and trouble swallowing.    Eyes: Negative for discharge, redness and visual disturbance.   Respiratory: Negative for cough, chest tightness, shortness of breath and wheezing.    Cardiovascular: Negative for chest pain, palpitations and leg swelling.   Gastrointestinal: Negative for abdominal pain, blood in stool, constipation, diarrhea and vomiting.   Endocrine: Negative for polydipsia and polyuria.   Genitourinary: Negative for difficulty urinating, dysuria, hematuria and menstrual problem.   Musculoskeletal: Positive for arthralgias and neck pain. Negative for back pain, gait problem, joint swelling and myalgias.   Skin: Negative for color change and rash.   Neurological: Positive for weakness and headaches. Negative for tremors, speech difficulty and numbness.   Hematological: Negative for adenopathy. Does not bruise/bleed easily.   Psychiatric/Behavioral: Positive for confusion. Negative for behavioral problems, dysphoric mood and sleep disturbance. The patient is not nervous/anxious.        Objective:      Physical Exam   Constitutional: She is oriented to person, place, and time. She appears well-developed and well-nourished. No distress.   Neck: Neck supple.   Pulmonary/Chest: Effort normal.   Musculoskeletal: She exhibits no edema.        Right lower leg: She exhibits no edema.        Left lower leg: She exhibits no edema.   Neurological: She is alert and oriented to person, place, and time. Coordination and gait abnormal.   Currently wheelchair bound.   Skin: Skin is warm and dry. No rash noted.   Psychiatric: She has a normal mood and affect. Her behavior is normal. Judgment and thought content normal.   Nursing note and vitals reviewed.      Assessment:       1. TIA (transient ischemic attack)    2. Cryptogenic organizing pneumonia    3. Pulmonary hypertension due to interstitial lung disease    4. Seropositive rheumatoid arthritis  of multiple sites    5. Immunosuppressed status    6. Debility    7. Generalized weakness    8. Cerebral microvascular disease    9. Chronic renal failure syndrome, stage 3 (moderate)    10. Hypertension, essential    11. Hyperlipidemia, unspecified hyperlipidemia type    12. Thyroid nodule    13. Hypertension, essential        Plan:     Medication List with Changes/Refills   Current Medications    ACETAMINOPHEN (TYLENOL) 325 MG TABLET    Take 2 tablets (650 mg total) by mouth every 6 (six) hours as needed.    ALBUTEROL (PROVENTIL/VENTOLIN HFA) 90 MCG/ACTUATION INHALER    Inhale 2 puffs into the lungs every 6 (six) hours as needed for Wheezing. Rescue    ASPIRIN (ECOTRIN) 81 MG EC TABLET    Take 81 mg by mouth once daily.    CLOPIDOGREL (PLAVIX) 75 MG TABLET    Take 1 tablet (75 mg total) by mouth once daily.    HYDROXYCHLOROQUINE (PLAQUENIL) 200 MG TABLET    Take 1 tablet (200 mg total) by mouth 2 (two) times daily.    MYCOPHENOLATE (CELLCEPT) 500 MG TAB    Take 1 tablet (500 mg total) by mouth 2 (two) times daily.    OMEPRAZOLE (PRILOSEC) 20 MG CAPSULE    Take 1 capsule (20 mg total) by mouth once daily.    PREDNISONE (DELTASONE) 5 MG TABLET    Take 20 mg by mouth once daily.   Changed and/or Refilled Medications    Modified Medication Previous Medication    AMLODIPINE (NORVASC) 10 MG TABLET amLODIPine (NORVASC) 10 MG tablet       Take 1 tablet (10 mg total) by mouth once daily.    Take 1 tablet (10 mg total) by mouth once daily.    ATORVASTATIN (LIPITOR) 40 MG TABLET atorvastatin (LIPITOR) 40 MG tablet       Take 1 tablet (40 mg total) by mouth once daily.    Take 1 tablet (40 mg total) by mouth once daily.    CARVEDILOL (COREG) 12.5 MG TABLET carvedilol (COREG) 12.5 MG tablet       Take 1 tablet (12.5 mg total) by mouth 2 (two) times daily with meals.    Take 1 tablet (12.5 mg total) by mouth 2 (two) times daily with meals.    FUROSEMIDE (LASIX) 40 MG TABLET furosemide (LASIX) 40 MG tablet       TAKE 1 TABLET(40  MG) BY MOUTH TWICE DAILY    TAKE 1 TABLET(40 MG) BY MOUTH TWICE DAILY    MONTELUKAST (SINGULAIR) 10 MG TABLET montelukast (SINGULAIR) 10 mg tablet       Take 1 tablet (10 mg total) by mouth every evening.    Take 1 tablet (10 mg total) by mouth every evening.   Discontinued Medications    BACLOFEN (LIORESAL) 10 MG TABLET    Take 5 mg by mouth nightly.    GABAPENTIN (NEURONTIN) 100 MG CAPSULE    Take 200 mg by mouth nightly.     Selma was seen today for home health review.    Diagnoses and all orders for this visit:    TIA (transient ischemic attack)  -     Ambulatory Consult to Home Health    Cryptogenic organizing pneumonia  -     Ambulatory Consult to Home Health    Pulmonary hypertension due to interstitial lung disease    Seropositive rheumatoid arthritis of multiple sites    Immunosuppressed status    Debility  -     Ambulatory Consult to Home Health    Generalized weakness  -     Ambulatory Consult to Home Health    Cerebral microvascular disease  -     Ambulatory Consult to Home Health    Chronic renal failure syndrome, stage 3 (moderate)    Hypertension, essential  -     carvedilol (COREG) 12.5 MG tablet; Take 1 tablet (12.5 mg total) by mouth 2 (two) times daily with meals.    Hyperlipidemia, unspecified hyperlipidemia type    Thyroid nodule  -     Ambulatory referral/consult to Endocrinology; Future    Hypertension, essential  Comments:  BP not at goal <130/80. Increase coreg to 12.5mg BID  Orders:  -     carvedilol (COREG) 12.5 MG tablet; Take 1 tablet (12.5 mg total) by mouth 2 (two) times daily with meals.    Other orders  -     amLODIPine (NORVASC) 10 MG tablet; Take 1 tablet (10 mg total) by mouth once daily.  -     atorvastatin (LIPITOR) 40 MG tablet; Take 1 tablet (40 mg total) by mouth once daily.  -     furosemide (LASIX) 40 MG tablet; TAKE 1 TABLET(40 MG) BY MOUTH TWICE DAILY  -     montelukast (SINGULAIR) 10 mg tablet; Take 1 tablet (10 mg total) by mouth every evening.      See meds, orders,  follow up, routing and instructions sections of encounter.

## 2019-10-17 NOTE — Clinical Note
Please contact patient to schedule appointment with endocrinologist for thyroid nodule.Please forward home health orders.

## 2019-10-21 ENCOUNTER — TELEPHONE (OUTPATIENT)
Dept: INTERNAL MEDICINE | Facility: CLINIC | Age: 84
End: 2019-10-21

## 2019-10-21 NOTE — TELEPHONE ENCOUNTER
----- Message from Alysha Kuhn sent at 10/21/2019  9:29 AM CDT -----  Contact: Tiffany/  [phone]  [facility] received an order for disciplines that are already present in the home so the nurse needs clarification . Please call Tiffany .

## 2019-10-21 NOTE — TELEPHONE ENCOUNTER
Tiffany from [facility] called stating they received orders for PT,OT and HH. Pt is already receiving these services.  OT and aid, order for 6 weeks. She says PT started on 10/8 OT started 10/9 and HH started 10/7. RAMONITA

## 2019-10-24 ENCOUNTER — TELEPHONE (OUTPATIENT)
Dept: HOME HEALTH SERVICES | Facility: HOSPITAL | Age: 84
End: 2019-10-24

## 2019-10-25 ENCOUNTER — EXTERNAL HOME HEALTH (OUTPATIENT)
Dept: HOME HEALTH SERVICES | Facility: HOSPITAL | Age: 84
End: 2019-10-25
Payer: MEDICARE

## 2019-10-31 ENCOUNTER — EXTERNAL CHRONIC CARE MANAGEMENT (OUTPATIENT)
Dept: PRIMARY CARE CLINIC | Facility: CLINIC | Age: 84
End: 2019-10-31
Payer: MEDICARE

## 2019-10-31 PROCEDURE — 99490 CHRNC CARE MGMT STAFF 1ST 20: CPT | Mod: PBBFAC | Performed by: FAMILY MEDICINE

## 2019-10-31 PROCEDURE — 99490 PR CHRONIC CARE MGMT, 1ST 20 MIN: ICD-10-PCS | Mod: S$PBB,,, | Performed by: FAMILY MEDICINE

## 2019-10-31 PROCEDURE — 99490 CHRNC CARE MGMT STAFF 1ST 20: CPT | Mod: S$PBB,,, | Performed by: FAMILY MEDICINE

## 2019-11-11 ENCOUNTER — PATIENT OUTREACH (OUTPATIENT)
Dept: ADMINISTRATIVE | Facility: OTHER | Age: 84
End: 2019-11-11

## 2019-11-11 ENCOUNTER — TELEPHONE (OUTPATIENT)
Dept: ENDOCRINOLOGY | Facility: CLINIC | Age: 84
End: 2019-11-11

## 2019-11-11 NOTE — TELEPHONE ENCOUNTER
Spoke with pt's relative to confirm appt time and location. Pt's relative verbalized understanding.

## 2019-11-12 ENCOUNTER — PATIENT MESSAGE (OUTPATIENT)
Dept: INTERNAL MEDICINE | Facility: CLINIC | Age: 84
End: 2019-11-12

## 2019-11-12 ENCOUNTER — PATIENT MESSAGE (OUTPATIENT)
Dept: RHEUMATOLOGY | Facility: CLINIC | Age: 84
End: 2019-11-12

## 2019-11-12 DIAGNOSIS — J84.116 CRYPTOGENIC ORGANIZING PNEUMONIA: ICD-10-CM

## 2019-11-12 DIAGNOSIS — M05.79 SEROPOSITIVE RHEUMATOID ARTHRITIS OF MULTIPLE SITES: Primary | ICD-10-CM

## 2019-11-12 DIAGNOSIS — M05.79 SEROPOSITIVE RHEUMATOID ARTHRITIS OF MULTIPLE SITES: ICD-10-CM

## 2019-11-12 RX ORDER — ATORVASTATIN CALCIUM 40 MG/1
40 TABLET, FILM COATED ORAL DAILY
Qty: 30 TABLET | Refills: 0 | Status: SHIPPED | OUTPATIENT
Start: 2019-11-12 | End: 2019-11-12 | Stop reason: SDUPTHER

## 2019-11-12 RX ORDER — ATORVASTATIN CALCIUM 40 MG/1
40 TABLET, FILM COATED ORAL DAILY
Qty: 30 TABLET | Refills: 0 | Status: SHIPPED | OUTPATIENT
Start: 2019-11-12 | End: 2020-01-13 | Stop reason: SDUPTHER

## 2019-11-12 RX ORDER — POTASSIUM CHLORIDE 750 MG/1
20 TABLET, EXTENDED RELEASE ORAL DAILY
Qty: 180 TABLET | Refills: 0 | Status: SHIPPED | OUTPATIENT
Start: 2019-11-12 | End: 2020-01-13 | Stop reason: SDUPTHER

## 2019-11-12 RX ORDER — AMLODIPINE BESYLATE 10 MG/1
10 TABLET ORAL DAILY
Qty: 90 TABLET | Refills: 0
Start: 2019-11-12 | End: 2019-11-22 | Stop reason: SDUPTHER

## 2019-11-12 RX ORDER — MONTELUKAST SODIUM 10 MG/1
10 TABLET ORAL NIGHTLY
Qty: 90 TABLET | Refills: 0 | Status: SHIPPED | OUTPATIENT
Start: 2019-11-12 | End: 2020-01-21 | Stop reason: SDUPTHER

## 2019-11-12 RX ORDER — OMEPRAZOLE 20 MG/1
20 CAPSULE, DELAYED RELEASE ORAL DAILY
Qty: 90 CAPSULE | Refills: 0 | Status: SHIPPED | OUTPATIENT
Start: 2019-11-12 | End: 2020-01-13 | Stop reason: SDUPTHER

## 2019-11-12 NOTE — PROGRESS NOTES
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    Chief Complaint: No chief complaint on file.    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  She was last seen by me on 7/26/2019 and she was doing better and moving better with botox, and she was getting scheduled for repeat.    She had trigger point in SCM on 1/17/2018.  She had a procedure in may with Dr. Giang and feels like pain has been better.   She was in the hospital in august, and then in skilled nursing.  She has been doing really well, but hurting the last couple of days.  She is having pain in the left hip.  She also has some shoulder pain with using the walker.  The pain goes down the middle of the leg and to the knee.  She has pain with standing in the left hip.  The pain is in both shoulders.  She is taking prednisone and they are decreasing the dose.  She is not having the neck pain.  She is taking baclofen and gabapentin once a day.  She was taking gabapentin 3 times a day.      X-ray hips 3/11/2019  No evidence of acute fracture, dislocation or bone destruction.  Moderate degenerative changes of the hip joints bilaterally left greater than right.  Severe degenerative changes of the lower lumbar spine.  Calcifications in the pelvis likely calcified fibroids.    Impression      Moderate degenerative changes of the hips.  Severe degenerative changes of the visualized portion of the lumbar spine.  No acute bony abnormalities.      MRI cervical 3/2017  There is mild anterolisthesis C2 on C3 and mild retrolisthesis of C3 on C4.  There is also mild retrolisthesis of C6 on C7.  The spondylolisthesis is likely secondary to ligamentous laxity.  There is straightening of normal cervical lordosis. Vertebral body heights are well maintained without evidence of fracture or marrow signal abnormality suspicious for an infiltrative process.  There is loss of  intervertebral disc space height at C5-6 and C6-7 Visualized posterior fossa, cervical cord, and upper thoracic cord demonstrate normal signal. Surrounding soft tissue structures demonstrate no significant abnormalities.      C2-3:  Posterior disc osteophyte complex and bilateral facet arthropathy.  Effacement of the anterior thecal sac, without flattening of the spinal cord.  No significant neural foraminal narrowing.     C3-4:  Posterior disc osteophyte complex and bilateral facet arthropathy, resulting in effacement of the anterior thecal sac, without flattening of the spinal cord.  There is moderate left-sided neuroforaminal narrowing.       C4-5:  Posterior disc osteophyte complex and bilateral facet arthropathy, resulting in effacement of the anterior thecal sac, without flattening of the spinal cord.  There is no significant neural foraminal narrowing.     C5-6:  Posterior disc osteophyte complex and bilateral facet arthropathy, resulting in effacement of the anterior thecal sac, without flattening of spinal cord.  No significant neural foraminal narrowing.     C6-7:  Posterior disc osteophyte complex and bilateral facet arthropathy, resulting in effacement of the anterior thecal sac, without flattening of the spinal cord.  No significant neural foraminal narrowing.     C7-T1:  No significant spinal canal stenosis.  No significant neural foraminal narrowing.  Impression         There is multilevel degenerative changes of the cervical spine, resulting in mild spinal canal stenosis and neuroforaminal narrowing as detailed above.      X-ray cervical  AP, neutral lateral, flexion lateral, and extension lateral radiographs of the cervical spine were obtained.  Note that the cervicothoracic junction is not optimally visualized.  There is 2 mm anterolisthesis of C2 on C3 which increases to 3 mm on the flexion radiographs.  The remainder of the posterior vertebral alignment is satisfactory.  Vertebral body heights  are well-maintained.  There are advanced degenerative changes identified throughout the cervical spine with disc space narrowing and anterior and posterior osteophyte formation.  There is solid bony fusion of the C5 and C6 vertebral bodies.  There is mild facet arthropathy noted throughout the cervical spine.  Atherosclerotic calcification is identified in the region of the carotid arteries bilaterally.  Impression         2 mm anterolisthesis of C2 on C3 which appears to be degenerative in etiology.  This increases to 3 mm on the flexion radiograph of the cervical spine.  Cervical spondylosis.  Atherosclerosis.    X-ray left shoulder 2015  Left shoulder: Significant acromiohumeral interval narrowing, this can be associated with a rotator cuff tear.  The humeral head demonstrate normal contour.  No osseous lesions, sclerosis or significant osteophyte formation.  The acromioclavicular joint   appears within normal limits.  The soft tissues appear normal.    Past Medical History:  No date: Anemia  No date: Arthritis  No date: Hashimoto's disease  No date: Hypertension  1/12/2016: IGT (impaired glucose tolerance)  No date: Joint pain  1/12/2016: Multinodular goiter    Past Surgical History:  No date: CATARACT EXTRACTION  9/29/2015: COLONOSCOPY N/A      Comment: Procedure: COLONOSCOPY;  Surgeon: FIDELINA Sanchez MD;  Location: [address]);                 Service: Endoscopy;  Laterality: N/A;  No date: JOINT REPLACEMENT      Comment: right knee  12/31/2013: KNEE SURGERY Left      Comment: TKR  No date: left parotidectomy      Comment: mixed tumor  No date: SALIVARY GLAND SURGERY  No date: TONSILLECTOMY    Review of patient's family history indicates:    Hypertension                   Mother                    Prostate cancer                Father                      Comment: prostate cancer    Breast cancer                  Maternal Grandmother      Lupus                          Neg Hx                     Psoriasis                      Neg Hx                    Melanoma                       Neg Hx                    Colon cancer                   Neg Hx                      Social History    Marital status:              Spouse name:                       Years of education:                 Number of children:               Social History Main Topics    Smoking status: Never Smoker                                                                Smokeless tobacco: Never Used                        Alcohol use: No                 Comment: very seldom     Drug use: No              Sexual activity: No                      Comment: ,  age 52,         Current Outpatient Prescriptions:  amlodipine (NORVASC) 5 MG tablet, TAKE 1 TABLET DAILY, Disp: 90 tablet, Rfl: 2  baclofen (LIORESAL) 10 MG tablet, Take 1 tablet (10 mg total) by mouth 3 (three) times daily., Disp: 270 tablet, Rfl: 2  clopidogrel (PLAVIX) 75 mg tablet, Take 1 tablet (75 mg total) by mouth once daily., Disp: 90 tablet, Rfl: 1  deutetrabenazine (AUSTEDO) 9 mg Tab, Take 2 tablets by mouth 2 (two) times daily. Final titrating dose - needs initially titration pack from company, Disp: 120 tablet, Rfl: 11  diazePAM (VALIUM) 2 MG tablet, Take 1 tablet (2 mg total) by mouth every evening., Disp: 30 tablet, Rfl: 3  ferrous sulfate 325 (65 FE) MG EC tablet, Take 325 mg by mouth once daily. , Disp: , Rfl:   gabapentin (NEURONTIN) 300 MG capsule, Take 1-2 capsules (300-600 mg total) by mouth 3 (three) times daily., Disp: 540 capsule, Rfl: 1  hydrocodone-acetaminophen 5-325mg (NORCO) 5-325 mg per tablet, Take 1 tablet by mouth every 24 hours as needed for Pain., Disp: 30 tablet, Rfl: 0  hydroxychloroquine (PLAQUENIL) 200 mg tablet, TAKE 2 TABLETS ONCE DAILY, Disp: 180 tablet, Rfl: 1  linaclotide (LINZESS) 145 mcg Cap capsule, Take 1 capsule (145 mcg total) by mouth once daily., Disp: 30 capsule, Rfl: 0  memantine (NAMENDA XR) 7-14-21-28 mg C24k,  Follow titration instructions 7 mg x1. 14 mg x1 week. 21 mg x1 week. 28 mg x1 week., Disp: 1 each, Rfl: 0  memantine 28 mg CSpX, Take 1 capsule (28 mg total) by mouth once daily. START after titration pack completed., Disp: 30 capsule, Rfl: 3  montelukast (SINGULAIR) 10 mg tablet, TAKE 1 TABLET EVERY EVENING, Disp: 90 tablet, Rfl: 2  omega 3-dha-epa-fish oil 250-350-1,000 mg Cap, Take 1 capsule by mouth 2 (two) times daily., Disp: 180 capsule, Rfl: 3  predniSONE (DELTASONE) 5 MG tablet, TAKE 2 TABLETS ONCE DAILY, Disp: 180 tablet, Rfl: 0  sertraline (ZOLOFT) 25 MG tablet, Take 1 tablet (25 mg total) by mouth once daily., Disp: 90 tablet, Rfl: 1  thiamine 100 MG tablet, Take 1 tablet (100 mg total) by mouth once daily., Disp: , Rfl:     No current facility-administered medications for this visit.       Review of patient's allergies indicates:  No Known Allergies        Review of Systems   Constitution: Negative for weight gain and weight loss.   Cardiovascular: Negative for chest pain and dyspnea on exertion.   Respiratory: Negative for shortness of breath.    Musculoskeletal: Positive for back pain and joint pain (bilateral shoulder and hip). Negative for joint swelling and neck pain.   Gastrointestinal: Negative for abdominal pain, bowel incontinence, nausea and vomiting.   Genitourinary: Negative for bladder incontinence and urgency.   Neurological: Negative for numbness and paresthesias.         Objective:        General: Selma is well-developed, well-nourished, appears stated age, in no acute distress, alert and oriented to time, place and person.     General    Vitals reviewed.  Constitutional: She is oriented to person, place, and time. She appears well-developed and well-nourished.   HENT:   Head: Normocephalic and atraumatic.   Pulmonary/Chest: Effort normal.   Neurological: She is alert and oriented to person, place, and time.   Psychiatric: She has a normal mood and affect. Her behavior is normal.  Judgment and thought content normal.     General Musculoskeletal Exam   Gait: abnormal (in a wheelchair)     Back (L-Spine & T-Spine) / Neck (C-Spine) Exam     Spinal Sensation   Right Side Sensation  C-Spine Level: normal   L-Spine Level: normal  S-Spine Level: normal  Left Side Sensation  C-Spine Level: normal  L-Spine Level: normal  S-Spine Level: normal    Other She has no scoliosis .  Spinal Kyphosis:  Absent  Right Shoulder Exam     Range of Motion   Active abduction: 100     Tests & Signs   Rotator Cuff Painful Arc/Range: moderate    Left Shoulder Exam     Range of Motion   Active abduction: 100     Tests & Signs   Rotator Cuff Painful Arc/Range: moderate      Muscle Strength   Right Upper Extremity   Biceps: 5/5/5   Deltoid:  5/5  Triceps:  5/5  Wrist extension: 5/5/5   : 4/5/5   Finger Flexors:  5/5  Left Upper Extremity  Biceps: 5/5/5   Deltoid:  5/5  Triceps:  5/5  Wrist extension: 5/5/5   :  4/5/5   Finger Flexors:  5/5  Right Lower Extremity   Hip Flexion: 5/5   Quadriceps:  5/5   Anterior tibial:  5/5/5  Left Lower Extremity   Hip Flexion: 5/5   Quadriceps:  5/5   Anterior tibial:  5/5/5     Reflexes     Left Side  Biceps:  2+  Triceps:  2+  Brachioradialis:  2+  Quadriceps:  2+  Achilles:  2+  Left Davis's Sign:  Absent  Babinski Sign:  absent    Right Side   Biceps:  2+  Triceps:  2+  Brachioradialis:  2+  Quadriceps:  2+  Achilles:  2+  Right Davis's Sign:  absent  Babinski Sign:  absent    Vascular Exam     Right Pulses        Carotid:                  2+    Left Pulses        Carotid:                  2+              Assessment:       1. Physical deconditioning    2. Spondylosis of cervical region without myelopathy or radiculopathy    3. Primary osteoarthritis of both shoulders    4. Primary osteoarthritis of left hip    5. Neck pain    6. Muscle spasm    7. Hemichorea    8. Isolated cervical dystonia    9. Chronic left shoulder pain           Plan:       Orders Placed This Encounter     IR Aspiration Injection Large Joint with Fluoro    Ambulatory Referral to Physical/Occupational Therapy    gabapentin (NEURONTIN) 100 MG capsule     1.  She is having more joint pain today.  She has severe DJD of the hips and shoulders.  She does not want to consider shoulder injections  2. PT for gait training, core and back strengthening, LE and UE strengthening, shoulder ROM, and hip ROM  3.  Baclofen 10mg po TID, she is taking 5-10 at night.    4.  gabapentin to 300 mg po QHs and she is going to try gabapentin 100-200 BID during the day  5.  She is not having as much hand and arm shaking on the left.  She feels like the chorea is better.  Neck pain has been better since procedure in may with Dr. jaramillo  6.   IR for left hip joint injection for hip DJD  7.  She is considering using a cane because walker hurts her shoulders more  8.  RTC 3 months      Follow-up: Follow up in about 3 months (around 2/13/2020). If there are any questions prior to this, the patient was instructed to contact the office.

## 2019-11-13 ENCOUNTER — OFFICE VISIT (OUTPATIENT)
Dept: ENDOCRINOLOGY | Facility: CLINIC | Age: 84
End: 2019-11-13
Attending: FAMILY MEDICINE
Payer: MEDICARE

## 2019-11-13 ENCOUNTER — OFFICE VISIT (OUTPATIENT)
Dept: SPINE | Facility: CLINIC | Age: 84
End: 2019-11-13
Attending: PHYSICAL MEDICINE & REHABILITATION
Payer: MEDICARE

## 2019-11-13 VITALS
DIASTOLIC BLOOD PRESSURE: 59 MMHG | BODY MASS INDEX: 28.83 KG/M2 | HEIGHT: 65 IN | HEART RATE: 71 BPM | SYSTOLIC BLOOD PRESSURE: 129 MMHG | WEIGHT: 173.06 LBS

## 2019-11-13 VITALS
DIASTOLIC BLOOD PRESSURE: 57 MMHG | BODY MASS INDEX: 28.83 KG/M2 | HEART RATE: 76 BPM | SYSTOLIC BLOOD PRESSURE: 115 MMHG | HEIGHT: 65 IN | WEIGHT: 173.06 LBS

## 2019-11-13 DIAGNOSIS — M47.812 SPONDYLOSIS OF CERVICAL REGION WITHOUT MYELOPATHY OR RADICULOPATHY: ICD-10-CM

## 2019-11-13 DIAGNOSIS — E04.1 THYROID NODULE: ICD-10-CM

## 2019-11-13 DIAGNOSIS — R53.81 PHYSICAL DECONDITIONING: Primary | ICD-10-CM

## 2019-11-13 DIAGNOSIS — G24.3 ISOLATED CERVICAL DYSTONIA: ICD-10-CM

## 2019-11-13 DIAGNOSIS — R76.8 THYROID ANTIBODY POSITIVE: ICD-10-CM

## 2019-11-13 DIAGNOSIS — M54.2 NECK PAIN: ICD-10-CM

## 2019-11-13 DIAGNOSIS — M25.512 CHRONIC LEFT SHOULDER PAIN: ICD-10-CM

## 2019-11-13 DIAGNOSIS — M19.012 PRIMARY OSTEOARTHRITIS OF BOTH SHOULDERS: ICD-10-CM

## 2019-11-13 DIAGNOSIS — M16.12 PRIMARY OSTEOARTHRITIS OF LEFT HIP: ICD-10-CM

## 2019-11-13 DIAGNOSIS — M62.838 MUSCLE SPASM: ICD-10-CM

## 2019-11-13 DIAGNOSIS — M19.011 PRIMARY OSTEOARTHRITIS OF BOTH SHOULDERS: ICD-10-CM

## 2019-11-13 DIAGNOSIS — E04.2 MULTINODULAR GOITER: ICD-10-CM

## 2019-11-13 DIAGNOSIS — G25.5 HEMICHOREA: ICD-10-CM

## 2019-11-13 DIAGNOSIS — G89.29 CHRONIC LEFT SHOULDER PAIN: ICD-10-CM

## 2019-11-13 PROCEDURE — 99214 PR OFFICE/OUTPT VISIT, EST, LEVL IV, 30-39 MIN: ICD-10-PCS | Mod: S$PBB,,, | Performed by: PHYSICAL MEDICINE & REHABILITATION

## 2019-11-13 PROCEDURE — 99999 PR PBB SHADOW E&M-EST. PATIENT-LVL III: CPT | Mod: PBBFAC,,, | Performed by: PHYSICAL MEDICINE & REHABILITATION

## 2019-11-13 PROCEDURE — 99999 PR PBB SHADOW E&M-EST. PATIENT-LVL III: ICD-10-PCS | Mod: PBBFAC,,, | Performed by: PHYSICAL MEDICINE & REHABILITATION

## 2019-11-13 PROCEDURE — 99204 OFFICE O/P NEW MOD 45 MIN: CPT | Mod: S$GLB,,, | Performed by: INTERNAL MEDICINE

## 2019-11-13 PROCEDURE — 99204 PR OFFICE/OUTPT VISIT, NEW, LEVL IV, 45-59 MIN: ICD-10-PCS | Mod: S$GLB,,, | Performed by: INTERNAL MEDICINE

## 2019-11-13 PROCEDURE — 99214 OFFICE O/P EST MOD 30 MIN: CPT | Mod: S$PBB,,, | Performed by: PHYSICAL MEDICINE & REHABILITATION

## 2019-11-13 PROCEDURE — 99213 OFFICE O/P EST LOW 20 MIN: CPT | Mod: PBBFAC | Performed by: PHYSICAL MEDICINE & REHABILITATION

## 2019-11-13 RX ORDER — GABAPENTIN 300 MG/1
300 CAPSULE ORAL NIGHTLY
COMMUNITY
End: 2020-02-04 | Stop reason: SDUPTHER

## 2019-11-13 RX ORDER — MYCOPHENOLATE MOFETIL 500 MG/1
500 TABLET ORAL 2 TIMES DAILY
Qty: 180 TABLET | Refills: 0 | Status: SHIPPED | OUTPATIENT
Start: 2019-11-13 | End: 2020-04-29 | Stop reason: SDUPTHER

## 2019-11-13 RX ORDER — PREDNISONE 5 MG/1
10 TABLET ORAL DAILY
Qty: 180 TABLET | Refills: 1 | Status: ON HOLD | OUTPATIENT
Start: 2019-11-13 | End: 2020-03-10 | Stop reason: SDUPTHER

## 2019-11-13 RX ORDER — BACLOFEN 10 MG/1
10 TABLET ORAL 3 TIMES DAILY PRN
COMMUNITY
End: 2020-07-10 | Stop reason: SDUPTHER

## 2019-11-13 RX ORDER — GABAPENTIN 100 MG/1
300 CAPSULE ORAL 2 TIMES DAILY
Qty: 360 CAPSULE | Refills: 2 | Status: SHIPPED | OUTPATIENT
Start: 2019-11-13 | End: 2020-02-04 | Stop reason: SDUPTHER

## 2019-11-13 RX ORDER — MYCOPHENOLATE MOFETIL 500 MG/1
500 TABLET ORAL 2 TIMES DAILY
Qty: 20 TABLET | Refills: 0 | Status: SHIPPED | OUTPATIENT
Start: 2019-11-13 | End: 2019-11-13

## 2019-11-13 RX ORDER — MYCOPHENOLATE MOFETIL 500 MG/1
500 TABLET ORAL 2 TIMES DAILY
Qty: 180 TABLET | Refills: 0 | Status: SHIPPED | OUTPATIENT
Start: 2019-11-13 | End: 2019-11-13

## 2019-11-13 NOTE — ASSESSMENT & PLAN NOTE
Pt with large right sided nodule, compressive symptoms   - had surgery evaluation before, cancelled surgery 2x. Had a lot of other things come up, multiple hospitalizations, rehab/recovery time, etc.   - last US a few years ago   - on other neck imaging, still with some deviation   - clinically, pt with some symptoms - voice change. But otherwise denies choking sensation, difficulty swallowing.   - repeat US, see if FNA needed   - offered referral back to ENT or endocrine surgery. Pt prefers not to go for a surgery at this time. Since I'm considering FNA, I did discuss with patient if she would be interested in a surgery if hypothetically the nodule was thyroid cancer. She would consider in that case, so it is reasonable to evaluate for FNA with ultrasound

## 2019-11-13 NOTE — Clinical Note
November 13, 2019      Bhargav Lee MD  [address]  [facility] [address]           [facility] - Endocrinology [address] [place] [address]  Phone: [phone]  Fax: [phone]          Patient: Selma Rosado MD Jose J   MR Number: [id number]   YOB: 1935   Date of Visit: 11/13/2019       Dear Dr. Bhargav Lee:    Thank you for referring Selma Lux to me for evaluation. Attached you will find relevant portions of my assessment and plan of care.    If you have questions, please do not hesitate to call me. I look forward to following Selma Lux along with you.    Sincerely,    Rashad Monroy MD    Enclosure  CC:  No Recipients    If you would like to receive this communication electronically, please contact [email or web address] or [phone] to request more information on [employer] access.    For providers and/or their staff who would like to refer a patient to [facility], please contact us through our one-stop-shop provider referral line, [facility], at [phone].    If you feel you have received this communication in error or would no longer like to receive these types of communications, please e-mail [email or web address]

## 2019-11-13 NOTE — PATIENT INSTRUCTIONS
For thyroid, repeat ultrasound.   If nodules look like they're getting larger/concerning, will go for repeat thyroid FNA (fine needle aspiration, the thyroid biopsy)      Diagnosing a Thyroid Problem     Fine needle aspiration.   Your healthcare provider thinks you may have a thyroid problem. In many cases, a thyroid problem can be easy to diagnose. Your healthcare provider is likely to ask about your medical history, give you a physical exam, and have you take blood tests. You may also need other tests. Based on the results, your healthcare provider may refer you to a hormone specialist (endocrinologist) or a surgeon.  Medical history  Your healthcare provider will ask about your medical history. Tell him or her if you:  · Have had changes in body temperature, heartbeat, weight, or energy level  · Are taking any medicines or supplements  · Have ever had thyroid surgery  · Have a family history of thyroid problems  · Are pregnant or plan to become pregnant  · Have ever been treated with radiation to the head or neck  Physical exam  After the medical history, your healthcare provider will examine you. He or she will feel your neck to check your thyroid gland for changes in size or shape. Your healthcare provider may also look for changes in heart rate, reflexes, muscle strength, or skin texture.  Blood tests  Your healthcare provider will order blood tests. They may include:  · TSH test. This shows how much thyroid stimulating hormone (TSH) is being made by the pituitary gland. This test can show if your thyroid is normal, overactive (hyperthyroidism), or underactive (hypothyroidism).  · T3 and T4 tests. These show the levels of T3 and T4 thyroid hormones in the blood. This test can help diagnose hyperthyroidism or hypothyroidism.  Other tests  Based on the results of your exam and blood tests, you may have other tests. They may include:  · Thyroid antibody tests. These are blood tests that look for problems with the  immune system. These tests are most often to look for underactive or overactive thyroid.  · Ultrasound. This test uses sound waves to create an image showing the size and shape of the thyroid gland. It is most often used to check for a lump in the thyroid (nodule) or enlarged thyroid(goiter).  · Radioactive iodine uptake test. This test measures how much iodine the thyroid gland takes in. It is most often to check for overactive thyroid.  · Thyroid scan. This is an imaging test that can show if part of the thyroid gland is making too much thyroid hormone. It is most often used to check for overactive thyroid.  Fine needle aspiration (FNA)  If you have a nodule, you may have a fine needle aspiration done. This is a type of biopsy. A biopsy is a procedure to remove a small sample of tissue. FNA is the best test to find out if thyroid cells are cancer. The healthcare provider uses a needle to take cells from the thyroid. The cells are then checked with a microscope. If cancer is suspected, other tests may also be done to help determine the type of cancer. Risks and complications of FNA are rare, but include mild discomfort, bleeding, and skin infection.  Date Last Reviewed: 11/1/2016  © 6136-0502 [employer]. [address] [place] [address], [place] [address]. All rights reserved. This information is not intended as a substitute for professional medical care. Always follow your healthcare professional's instructions.

## 2019-11-13 NOTE — LETTER
November 13, 2019        Bhargav Lee MD  [address]  [facility] [address]             [facility] Endocrinology [address]  [address] [place] [address], [facility] [address] [place] [address]  Phone: [phone]  Fax: [phone]   Patient: Selmakirsten Rosado MD Jose J   MR Number: [id number]   YOB: 1935   Date of Visit: 11/13/2019       Dear Dr. Lee:    Thank you for referring Selma Lux to me for evaluation. Below are the relevant portions of my assessment and plan of care.    Pretty big nodule on the right, on MRI/CT looks like it's causing some tracheal deviation but pt is currently denying most symptoms associated with that other than voice change. She isn't excited about going for surgery evaluation again, but is up for repeat ultrasound and FNA if needed (and she would think about surgery if there were thyroid cancer). So will see if she needs a new biopsy. If not, I'd just follow-up with her in about a year (but if you see her in between and she's getting bothered by it more/wants surgery she can of course go back to see endocrine surgery sooner).     If you have questions, please do not hesitate to call me. I look forward to following Selma along with you.    Sincerely,      Rashad Monroy MD           CC  No Recipients

## 2019-11-13 NOTE — ASSESSMENT & PLAN NOTE
Has elevated TPO in the past    - last TSH normal   - clinically, biochemically euthyroid   - monitor TSH yearly

## 2019-11-15 ENCOUNTER — PATIENT MESSAGE (OUTPATIENT)
Dept: RHEUMATOLOGY | Facility: CLINIC | Age: 84
End: 2019-11-15

## 2019-11-18 ENCOUNTER — PATIENT OUTREACH (OUTPATIENT)
Dept: ADMINISTRATIVE | Facility: OTHER | Age: 84
End: 2019-11-18

## 2019-11-19 ENCOUNTER — TELEPHONE (OUTPATIENT)
Dept: HOME HEALTH SERVICES | Facility: HOSPITAL | Age: 84
End: 2019-11-19

## 2019-11-20 ENCOUNTER — OFFICE VISIT (OUTPATIENT)
Dept: CARDIOLOGY | Facility: CLINIC | Age: 84
End: 2019-11-20
Payer: MEDICARE

## 2019-11-20 VITALS
BODY MASS INDEX: 30.71 KG/M2 | HEIGHT: 64 IN | DIASTOLIC BLOOD PRESSURE: 61 MMHG | WEIGHT: 179.88 LBS | SYSTOLIC BLOOD PRESSURE: 125 MMHG | HEART RATE: 72 BPM

## 2019-11-20 DIAGNOSIS — R60.9 EDEMA, UNSPECIFIED TYPE: Primary | ICD-10-CM

## 2019-11-20 DIAGNOSIS — J84.116 CRYPTOGENIC ORGANIZING PNEUMONIA: ICD-10-CM

## 2019-11-20 DIAGNOSIS — I48.0 AF (PAROXYSMAL ATRIAL FIBRILLATION): ICD-10-CM

## 2019-11-20 DIAGNOSIS — I10 HYPERTENSION, ESSENTIAL: ICD-10-CM

## 2019-11-20 PROCEDURE — 99999 PR PBB SHADOW E&M-EST. PATIENT-LVL V: CPT | Mod: PBBFAC,GC,, | Performed by: STUDENT IN AN ORGANIZED HEALTH CARE EDUCATION/TRAINING PROGRAM

## 2019-11-20 PROCEDURE — 1126F AMNT PAIN NOTED NONE PRSNT: CPT | Mod: GC,,, | Performed by: STUDENT IN AN ORGANIZED HEALTH CARE EDUCATION/TRAINING PROGRAM

## 2019-11-20 PROCEDURE — 99214 OFFICE O/P EST MOD 30 MIN: CPT | Mod: S$PBB,GC,, | Performed by: STUDENT IN AN ORGANIZED HEALTH CARE EDUCATION/TRAINING PROGRAM

## 2019-11-20 PROCEDURE — 1159F MED LIST DOCD IN RCRD: CPT | Mod: GC,,, | Performed by: STUDENT IN AN ORGANIZED HEALTH CARE EDUCATION/TRAINING PROGRAM

## 2019-11-20 PROCEDURE — 1126F PR PAIN SEVERITY QUANTIFIED, NO PAIN PRESENT: ICD-10-PCS | Mod: GC,,, | Performed by: STUDENT IN AN ORGANIZED HEALTH CARE EDUCATION/TRAINING PROGRAM

## 2019-11-20 PROCEDURE — 1159F PR MEDICATION LIST DOCUMENTED IN MEDICAL RECORD: ICD-10-PCS | Mod: GC,,, | Performed by: STUDENT IN AN ORGANIZED HEALTH CARE EDUCATION/TRAINING PROGRAM

## 2019-11-20 PROCEDURE — 99214 PR OFFICE/OUTPT VISIT, EST, LEVL IV, 30-39 MIN: ICD-10-PCS | Mod: S$PBB,GC,, | Performed by: STUDENT IN AN ORGANIZED HEALTH CARE EDUCATION/TRAINING PROGRAM

## 2019-11-20 PROCEDURE — 99999 PR PBB SHADOW E&M-EST. PATIENT-LVL V: ICD-10-PCS | Mod: PBBFAC,GC,, | Performed by: STUDENT IN AN ORGANIZED HEALTH CARE EDUCATION/TRAINING PROGRAM

## 2019-11-20 PROCEDURE — 99215 OFFICE O/P EST HI 40 MIN: CPT | Mod: PBBFAC | Performed by: STUDENT IN AN ORGANIZED HEALTH CARE EDUCATION/TRAINING PROGRAM

## 2019-11-21 NOTE — PROGRESS NOTES
Ms. Lux is a patient of Dr. Wolf and was last seen in [facility] 10/3/2018.    Subjective:   Patient ID:  Selma Rosado MD Jose J is a 84 y.o. female who presents for follow-up of cardiac problems.    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 Otherwise doing well, feels breathing is stable. Had more activity this past Sunday with singing with a choir and several other activities and very tired after several hours but not necessarily dyspneic. No PND, orthopnea. Has been started on cellcept, with plan to wean down chronic prednisone. Recent Echo with EF reported as 40%; reviewed and compared to past echos with staff and appears stable EF, low-nml ~50-55%. Weight has been stable in flowsheet. On lasix 20 daily at home, with good UOP when she takes.     Review of Systems   Constitution: Negative for decreased appetite, diaphoresis, malaise/fatigue, weight gain and weight loss.   Eyes: Negative for visual disturbance.   Cardiovascular: Negative for chest pain, claudication, dyspnea on exertion, irregular heartbeat, leg swelling, near-syncope, orthopnea, palpitations, paroxysmal nocturnal dyspnea and syncope.        Denies chest pressure   Respiratory: Negative for cough, hemoptysis, shortness of breath, sleep disturbances due to breathing and snoring.    Endocrine: Negative for cold intolerance and heat intolerance.   Hematologic/Lymphatic: Negative for bleeding problem. Does not bruise/bleed easily.    Musculoskeletal: Negative for myalgias.   Gastrointestinal: Negative for bloating, abdominal pain, anorexia, change in bowel habit, constipation, diarrhea, nausea and vomiting.   Neurological: Negative for difficulty with concentration, disturbances in coordination, excessive daytime sleepiness, dizziness, headaches, light-headedness, loss of balance, numbness and weakness.   Psychiatric/Behavioral: The patient does not have insomnia.      Allergies and current medications updated and reviewed:  Review of patient's allergies indicates:  No Known Allergies  Current Outpatient Medications   Medication Sig    aspirin (ECOTRIN) 81 MG EC tablet Take 81 mg by mouth once daily.    baclofen (LIORESAL) 10 MG tablet Take 1 tablet (10 mg total) by mouth 3 (three) times daily.    carvedilol (COREG) 6.25 MG tablet Take 1 tablet (6.25 mg total) by mouth 2 (two) times daily with meals.    clopidogrel (PLAVIX) 75 mg tablet 1 tablet (75 mg total) by Per G Tube route once daily. (Patient taking differently: Take 75 mg by mouth once daily. )    furosemide (LASIX) 20 MG tablet Take 1 tablet (20 mg total) by mouth 2 (two) times daily.    gabapentin (NEURONTIN) 300 MG capsule Take 1 capsule (300 mg total) by mouth 3 (three) times daily.    hydroxychloroquine (PLAQUENIL) 200 mg tablet 2 tablets (400 mg total) by Per G Tube route once daily. (Patient taking differently: Take 400 mg by mouth once daily. )    magnesium oxide (MAG-OX) 400 mg tablet Take 400 mg by mouth once daily.    montelukast (SINGULAIR) 10 mg tablet Take 1 tablet (10 mg total) by mouth every evening.    omeprazole (PRILOSEC) 20 MG capsule Take 20 mg by mouth once daily.     pantoprazole (PROTONIX) 40 mg GrPS 1 packet (40 mg total) by Per G Tube route once daily. (Patient taking differently: Take 20 mg by mouth once daily. )    predniSONE (DELTASONE) 5 MG tablet Take 1 tablet (5 mg total) by mouth once daily. Take 4 in the am and 3 in the evening for 2 weeks  "- then  Take 3 in the am and 3 in the evening for 2 weeks - then  Take 3 in the am and 2 in the evening for 2 weeks - consult with rheumatology for further advice    thiamine 100 MG tablet Take 100 mg by mouth once daily.     No current facility-administered medications for this visit.        Objective:     Right Arm BP - Sittin/56 (19 1133)  Left Arm BP - Sittin/61 (19 1133)    /61   Pulse 72   Ht 5' 4" (1.626 m)   Wt 81.6 kg (179 lb 14.3 oz)   LMP  (LMP Unknown)   BMI 30.88 kg/m²       Physical Exam   Constitutional: She is oriented to person, place, and time. Vital signs are normal. She appears well-developed and well-nourished. She is active. No distress.   HENT:   Head: Normocephalic and atraumatic.   Eyes: Conjunctivae and lids are normal. No scleral icterus.   Neck: Neck supple. Normal carotid pulses, no hepatojugular reflux and no JVD present. Carotid bruit is not present.   Cardiovascular: Normal rate, S1 normal, S2 normal and intact distal pulses. An irregularly irregular rhythm present. Exam reveals no gallop and no friction rub.   No murmur heard.  Pulmonary/Chest: Effort normal and breath sounds normal. No respiratory distress. She has no decreased breath sounds. She has no wheezes. She has no rhonchi. She has no rales. She exhibits no tenderness.   Abdominal: Soft. Normal appearance and bowel sounds are normal. She exhibits no distension, no fluid wave, no abdominal bruit, no ascites and no pulsatile midline mass. There is no hepatosplenomegaly. There is no tenderness.   Musculoskeletal: She exhibits edema (1-2+ L, 1+ R).   Neurological: She is alert and oriented to person, place, and time. Gait normal.   Skin: Skin is warm, dry and intact. No rash noted. She is not diaphoretic. Nails show no clubbing.   Psychiatric: She has a normal mood and affect. Her speech is normal and behavior is normal. Judgment and thought content normal. Cognition and memory are normal. "   Nursing note and vitals reviewed.      Chemistry        Component Value Date/Time     09/05/2019 0505    K 4.4 09/05/2019 0505     09/05/2019 0505    CO2 26 09/05/2019 0505    BUN 22 09/05/2019 0505    CREATININE 1.1 09/05/2019 0505    GLU 74 09/05/2019 0505        Component Value Date/Time    CALCIUM 9.3 09/05/2019 0505    ALKPHOS 48 (L) 08/29/2019 0441    AST 16 08/29/2019 0441    ALT 10 08/29/2019 0441    BILITOT 0.9 08/29/2019 0441    ESTGFRAFRICA 54.4 (A) 09/05/2019 0505    EGFRNONAA 47.2 (A) 09/05/2019 0505        Lab Results   Component Value Date    HGBA1C 5.6 08/27/2019     Recent Labs   Lab 03/13/19  1312  04/14/19  1246 08/26/19  2357  09/05/19  0505   WBC 14.37 H   < > 11.04  --    < > 11.14   Hemoglobin 12.1   < > 12.9  --    < > 11.8 L   POC Hematocrit  --   --   --   --    < >  --    Hematocrit 38.4   < > 42.2  --    < > 37.9   Mean Corpuscular Volume 86   < > 87  --    < > 88   Platelets 269   < > 150  --    < > 249   BNP 97  --  142 H 70  --   --    TSH 1.830  --   --  0.895  --   --    Cholesterol  --   --   --  197  --   --    HDL  --   --   --  90 H  --   --    LDL Cholesterol  --   --   --  91.6  --   --    Triglycerides  --   --   --  77  --   --    Hdl/Cholesterol Ratio  --   --   --  45.7  --   --     < > = values in this interval not displayed.       Recent Labs   Lab 04/25/18  0832 03/13/19  1312 04/14/19  1246 08/28/19  0608   INR 1.2 1.2 1.0 1.0        Test(s) Reviewed  I have reviewed the following in detail:  [] Stress test   [] Angiography   [x] Echocardiogram   [x] Labs   [] Other:         Assessment/Plan:     1. AF (paroxysmal atrial fibrillation)  CANCELED: IN OFFICE EKG 12-LEAD (to Muse)   2. Edema, unspecified type  BASIC METABOLIC PANEL   3. Hypertension, essential     4. Cryptogenic organizing pneumonia       Doing well from cardiac perspective. Mild SANAZ which she feels has increased. Will trial doubling lasix for next 3-4 days with 20 bid. If edema improves, can  continue on higher dose with BMP in 1-2 weeks. If no change, more likely venous insufficiency. Appears euvolemic otherwise on exam.     Edema  -Suspect more likely venous insufficiency; will trial increased lasix 20 bid as above    HTN:   -At goal <130/80  -Continue current meds    pAF  -Single episode precipitated by hypoxic resp failure in 2018  -Negative event monitor  -Previously discussed AC (Chadsvasc 4) and given lack of recurrence, opted toward monitoring and adding only if clinical AF recurs    The patient was discussed and examined by Dr. Garcia    Follow up in about 1 month (around 12/20/2019).

## 2019-11-22 ENCOUNTER — HOSPITAL ENCOUNTER (OUTPATIENT)
Facility: OTHER | Age: 84
Discharge: HOME | End: 2019-11-22
Attending: RADIOLOGY | Admitting: RADIOLOGY
Payer: MEDICARE

## 2019-11-22 ENCOUNTER — PATIENT MESSAGE (OUTPATIENT)
Dept: INTERNAL MEDICINE | Facility: CLINIC | Age: 84
End: 2019-11-22

## 2019-11-22 ENCOUNTER — PATIENT MESSAGE (OUTPATIENT)
Dept: RHEUMATOLOGY | Facility: CLINIC | Age: 84
End: 2019-11-22

## 2019-11-22 VITALS
OXYGEN SATURATION: 100 % | SYSTOLIC BLOOD PRESSURE: 138 MMHG | RESPIRATION RATE: 16 BRPM | HEIGHT: 64 IN | TEMPERATURE: 98 F | DIASTOLIC BLOOD PRESSURE: 60 MMHG | WEIGHT: 176 LBS | BODY MASS INDEX: 30.05 KG/M2 | HEART RATE: 74 BPM

## 2019-11-22 DIAGNOSIS — M05.79 SEROPOSITIVE RHEUMATOID ARTHRITIS OF MULTIPLE SITES: ICD-10-CM

## 2019-11-22 DIAGNOSIS — M16.12 PRIMARY OSTEOARTHRITIS OF LEFT HIP: ICD-10-CM

## 2019-11-22 PROCEDURE — 25000003 PHARM REV CODE 250: Performed by: RADIOLOGY

## 2019-11-22 PROCEDURE — 25500020 PHARM REV CODE 255: Performed by: RADIOLOGY

## 2019-11-22 PROCEDURE — 63600175 PHARM REV CODE 636 W HCPCS: Performed by: RADIOLOGY

## 2019-11-22 RX ORDER — BUPIVACAINE HYDROCHLORIDE 2.5 MG/ML
INJECTION, SOLUTION EPIDURAL; INFILTRATION; INTRACAUDAL
Status: DISCONTINUED | OUTPATIENT
Start: 2019-11-22 | End: 2019-11-22 | Stop reason: HOSPADM

## 2019-11-22 RX ORDER — METHYLPREDNISOLONE ACETATE 40 MG/ML
INJECTION, SUSPENSION INTRA-ARTICULAR; INTRALESIONAL; INTRAMUSCULAR; SOFT TISSUE
Status: DISCONTINUED | OUTPATIENT
Start: 2019-11-22 | End: 2019-11-22 | Stop reason: HOSPADM

## 2019-11-22 RX ORDER — HYDROXYCHLOROQUINE SULFATE 200 MG/1
200 TABLET, FILM COATED ORAL 2 TIMES DAILY
Qty: 180 TABLET | Refills: 1 | Status: SHIPPED | OUTPATIENT
Start: 2019-11-22 | End: 2020-04-29 | Stop reason: SDUPTHER

## 2019-11-22 RX ORDER — AMLODIPINE BESYLATE 10 MG/1
10 TABLET ORAL DAILY
Qty: 90 TABLET | Refills: 0 | Status: SHIPPED | OUTPATIENT
Start: 2019-11-22 | End: 2020-01-13 | Stop reason: SDUPTHER

## 2019-11-22 RX ORDER — LIDOCAINE HYDROCHLORIDE 10 MG/ML
INJECTION, SOLUTION EPIDURAL; INFILTRATION; INTRACAUDAL; PERINEURAL
Status: DISCONTINUED | OUTPATIENT
Start: 2019-11-22 | End: 2019-11-22 | Stop reason: HOSPADM

## 2019-11-22 NOTE — PROCEDURES
Radiology Post-Procedure Note    Pre Op Diagnosis: L hip pain  Post Op Diagnosis: Same    Procedure: L hip steroid injection    Procedure performed by: Skyler Hernandez MD    Written Informed Consent Obtained: Yes  Specimen Removed: NO  Estimated Blood Loss: Minimal    Findings:   Successful L hip injection.  40 mg Solumedrol and 2 cc bupivicaine    Patient tolerated procedure well.    @SIG@

## 2019-11-22 NOTE — OR NURSING
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 Vital Signs are stable, ambulating  without difficulty. Discharge instructions given, patient verbalized understanding. Discharged from facility via wheelchair in stable condition.

## 2019-11-22 NOTE — H&P
Consult/H&P Note  Interventional Radiology    Consult Requested By: pain management    Reason for Consult: hip pain    SUBJECTIVE:     Chief Complaint: hip pain    History of Present Illness: 83 yo F with multiple comorbidities as below, multifocal joint pain.  She is here for L hip injection.  She also states that her R hip is causing more and more pain as well.    Past Medical History:   Diagnosis Date    Acute hypoxemic respiratory failure 4/11/2018    Altered mental status     Anemia     Arthritis     Bilateral hand pain 3/14/2018    Branch retinal vein occlusion, left eye 2/20/2015    Chronic bilateral low back pain without sciatica 3/23/2017    Chronic renal failure in pediatric patient, stage 3 (moderate) 4/15/2018    Cognitive communication deficit 12/19/2017    Cystoid macular edema, left eye 2/20/2015    Cystoid macular edema, left eye 2/20/2015    DJD (degenerative joint disease) of cervical spine 5/15/2013    Fatty liver 8/26/2014    Goiter 4/9/2018    Hashimoto's disease     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    Hypertension     Hypertensive encephalopathy     IBS (irritable bowel syndrome) 6/21/2017    IGT (impaired glucose tolerance) 1/12/2016    Iron deficiency anemia secondary to inadequate dietary iron intake 6/24/2013    Joint pain     Keratoconjunctivitis sicca of both eyes not specified as Sjogren's 7/29/2016    Leiomyoma of uterus, unspecified 9/16/2014    Long QT interval 6/29/2016    Due to medication (plaquenil)     Macular edema 1/12/2015    Multinodular goiter 1/12/2016    Neuropathy 8/23/2017    Plaquenil causing adverse effect in therapeutic use 10/7/2016    Pneumococcal vaccine refused 8/17/2016    Pneumonia due to Streptococcus pneumoniae 4/5/2018    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/2015    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    s/p Right Total knee replacement 5/15/2013    Scoliosis of thoracic spine 5/15/2013    Small vessel  disease, cerebrovascular 12/28/2017    Stroke     UTI (urinary tract infection) 12/29/2018    Vascular dementia 12/6/2017     Past Surgical History:   Procedure Laterality Date    BREAST CYST EXCISION      CATARACT EXTRACTION      COLONOSCOPY N/A 9/29/2015    Procedure: COLONOSCOPY;  Surgeon: FIDELINA Sanchez MD;  Location: [address]);  Service: Endoscopy;  Laterality: N/A;    EYE SURGERY      JOINT REPLACEMENT      right knee    KNEE SURGERY Left 12/31/2013    TKR    left parotidectomy      mixed tumor    SALIVARY GLAND SURGERY      TONSILLECTOMY       Family History   Problem Relation Age of Onset    Hypertension Mother     Heart disease Mother     Hyperthyroidism Mother     Prostate cancer Father         prostate cancer    Cancer Father     Breast cancer Maternal Grandmother     Hyperthyroidism Other      Social History     Tobacco Use    Smoking status: Never Smoker    Smokeless tobacco: Never Used   Substance Use Topics    Alcohol use: No     Alcohol/week: 0.0 standard drinks     Frequency: Monthly or less     Drinks per session: 1 or 2     Binge frequency: Never     Comment: very seldom     Drug use: No       Review of Systems:  Constitutional/General:No fever, chills, change in appetite or weight loss.  Hematological/Immuno: no known coagulopathies  Respiratory: no shortness of breath  Cardiovascular: no chest pain  Gastrointestinal: no abdominal pain  Genito-Urinary: no dysuria  Musculoskeletal: positive for - joint pain  Skin: Negative for rash, itching, pigmentation changes, nail or hair changes.  Neurological: no TIA or stroke symptoms  Psychiatric: normal mood/affect, good insight/judgement      OBJECTIVE:     Vital Signs Range (Last 24H):  Temp:  [98 °F (36.7 °C)]   Pulse:  [82]   Resp:  [18]   BP: (120)/(67)   SpO2:  [98 %]     Physical Exam:  General- Patient alert and oriented x3 in NAD  ENT- PERRLA,  Neck- No masses  CV- Regular rate and rhythm  Resp-  No increased  WOB  GI- Non tender/non-distended  Extrem- No cyanosis, clubbing, edema.   Derm- No rashes, masses, or lesions noted  Neuro-  No focal deficits noted.     Physical Exam  Body mass index is 30.21 kg/m².    Scheduled Meds:   Continuous Infusions:   PRN Meds:    Allergies: Review of patient's allergies indicates:  No Known Allergies    Labs:  No results for input(s): INR in the last 168 hours.    Invalid input(s):  PT,  PTT  No results for input(s): WBC, HGB, HCT, MCV, PLT in the last 168 hours. No results for input(s): GLU, NA, K, CL, CO2, BUN, CREATININE, CALCIUM, MG, ALT, AST, ALBUMIN, BILITOT, BILIDIR in the last 168 hours.    Vitals (Most Recent):  Temp: 98 °F (36.7 °C) (11/22/19 0901)  Pulse: 82 (11/22/19 0901)  Resp: 18 (11/22/19 0901)  BP: 120/67 (11/22/19 0901)  SpO2: 98 % (11/22/19 0901)    ASA: 3  Mallampati: 2    Consent obtained    ASSESSMENT/PLAN:     L hip steroid injection.  Local anesthesia.    Active Hospital Problems    Diagnosis  POA    Primary osteoarthritis of left hip [M16.12]  Yes      Resolved Hospital Problems   No resolved problems to display.           Skyler Hernandez MD

## 2019-11-30 ENCOUNTER — EXTERNAL CHRONIC CARE MANAGEMENT (OUTPATIENT)
Dept: PRIMARY CARE CLINIC | Facility: CLINIC | Age: 84
End: 2019-11-30
Payer: MEDICARE

## 2019-11-30 PROCEDURE — 99490 PR CHRONIC CARE MGMT, 1ST 20 MIN: ICD-10-PCS | Mod: S$PBB,,, | Performed by: FAMILY MEDICINE

## 2019-11-30 PROCEDURE — 99490 CHRNC CARE MGMT STAFF 1ST 20: CPT | Mod: S$PBB,,, | Performed by: FAMILY MEDICINE

## 2019-11-30 PROCEDURE — 99490 CHRNC CARE MGMT STAFF 1ST 20: CPT | Mod: PBBFAC | Performed by: FAMILY MEDICINE

## 2019-12-02 ENCOUNTER — PATIENT MESSAGE (OUTPATIENT)
Dept: ENDOCRINOLOGY | Facility: CLINIC | Age: 84
End: 2019-12-02

## 2019-12-03 NOTE — TELEPHONE ENCOUNTER
Order still in, they don't need another one, just need to reschedule. Please help patient coordinate with radiology to reschedule the thyroid ultrasound.

## 2019-12-04 NOTE — PROGRESS NOTES
Admission medication history interview status for this patient is complete. See [facility] admission navigator for allergy information, prior to admission medications and immunization status.     Medication history interview source(s):Patient  Medication history resources (including written lists, pill bottles, clinic record):EPIC    Changes made to PTA medication list:  Added: Flomax, Finasterife  Deleted: Glipizide Xl  Changed: Metformin, Remeron to regular,    Additional medication history information:ASA -  took 2 weeks ago due to having teeth pulled    Do you take OTC medications (eg tylenol, ibuprofen, fish oil, eye/ear drops, etc)? Yes     For patients on insulin therapy: No    Prior to Admission medications    Medication Sig Last Dose Taking? Auth Provider   acetaminophen (TYLENOL) 325 MG tablet Take 2 tablets (650 mg) by mouth every 4 hours as needed for mild pain  Yes Elias Boswell MD   carvedilol (COREG) 25 MG tablet Take 1 tablet (25 mg) by mouth 2 times daily (with meals) 12/3/2019 at am Yes Eric Avalos MD   finasteride (PROSCAR) 5 MG tablet Take 5 mg by mouth daily 12/3/2019 at am Yes Unknown, Entered By History   gabapentin (NEURONTIN) 300 MG capsule Take 300 mg by mouth 2 times daily 12/3/2019 at am Yes Unknown, Entered By History   losartan (COZAAR) 50 MG tablet Take 50 mg by mouth daily 12/3/2019 at am Yes Unknown, Entered By History   metFORMIN (GLUCOPHAGE) 500 MG tablet Take 500 mg by mouth 2 times daily (with meals) 12/3/2019 at am Yes Unknown, Entered By History   mirtazapine (REMERON) 15 MG tablet Take 15 mg by mouth At Bedtime 12/2/2019 at Unknown time Yes Unknown, Entered By History   OLANZapine (ZYPREXA) 2.5 MG tablet Take 1 tablet (2.5 mg) by mouth 2 times daily 8AM AND 6PM 12/3/2019 at am Yes Dillon Chatterjee MD   olopatadine HCl (PATADAY) 0.2 % SOLN Place 1 drop into both eyes daily as needed  Yes Unknown, Entered By History   polyethylene glycol (MIRALAX/GLYCOLAX) powder Take 1  SUBJECTIVE:   81 y.o. female   for follow up of fibroids. No LMP recorded. Patient is postmenopausal..  She has no unusual complaints.  She has known fibroids since she was in 20's.  More recently had consult here  for fibroids noted on CT scan.  No PMB.  No HRT.  No pelvic pain.  Had MRI  which showed enlarged uterus with fibroids.         Past Medical History:   Diagnosis Date    Anemia     Arthritis     Hashimoto's disease     Hypertension     IGT (impaired glucose tolerance) 2016    Joint pain     Multinodular goiter 2016     Past Surgical History:   Procedure Laterality Date    CATARACT EXTRACTION      COLONOSCOPY N/A 2015    Procedure: COLONOSCOPY;  Surgeon: FIDELINA Sanchez MD;  Location: [facility] ([address] [place] [address]);  Service: Endoscopy;  Laterality: N/A;    JOINT REPLACEMENT      right knee    KNEE SURGERY Left 2013    TKR    left parotidectomy      mixed tumor    SALIVARY GLAND SURGERY      TONSILLECTOMY       Social History     Social History    Marital status:      Spouse name: N/A    Number of children: N/A    Years of education: N/A     Occupational History    Not on file.     Social History Main Topics    Smoking status: Never Smoker    Smokeless tobacco: Never Used    Alcohol use No    Drug use: No    Sexual activity: No      Comment: ,  age 52,      Other Topics Concern    Not on file     Social History Narrative    No narrative on file     Family History   Problem Relation Age of Onset    Hypertension Mother     Prostate cancer Father      prostate cancer    Breast cancer Maternal Grandmother     Lupus Neg Hx     Psoriasis Neg Hx     Melanoma Neg Hx     Colon cancer Neg Hx      OB History    Para Term  AB Living   3 2 2   1 2   SAB TAB Ectopic Multiple Live Births   1              # Outcome Date GA Lbr Mehdi/2nd Weight Sex Delivery Anes PTL Lv   3 SAB            2 Term            1 Term                      Current Outpatient Prescriptions   Medication Sig Dispense Refill    amlodipine (NORVASC) 5 MG tablet TAKE 1 TABLET DAILY 90 tablet 2    ferrous sulfate 325 (65 FE) MG EC tablet Take 325 mg by mouth 2 (two) times daily.       gabapentin (NEURONTIN) 300 MG capsule Take 1 capsule (300 mg total) by mouth 3 (three) times daily. 60 capsule 11    hydroxychloroquine (PLAQUENIL) 200 mg tablet TAKE 2 TABLETS ONCE DAILY 180 tablet 1    hyoscyamine (LEVSIN) 0.125 mg TbDL Take 1 tablet (0.125 mg total) by mouth every 4 (four) hours as needed for Cramping. 60 tablet 3    montelukast (SINGULAIR) 10 mg tablet TAKE 1 TABLET EVERY EVENING 90 tablet 2    diazePAM (VALIUM) 2 MG tablet Take 1 tablet (2 mg total) by mouth every 6 (six) hours as needed for Anxiety. Trial 1/2 - 1 tab as needed BID PRN 30 tablet 1    dicyclomine (BENTYL) 20 mg tablet Take 1 tablet (20 mg total) by mouth 2 (two) times daily. 20 tablet 0    hydrocodone-acetaminophen 5-325mg (NORCO) 5-325 mg per tablet Take 1 tablet by mouth every 24 hours as needed for Pain. 30 tablet 0    NEXIUM 40 mg capsule TAKE 1 CAPSULE DAILY 90 capsule 2    predniSONE (DELTASONE) 5 MG tablet TAKE 2 TABLETS ONCE DAILY 180 tablet 0    tetrabenazine (XENAZINE) 12.5 mg tablet Take 1 tablet (12.5 mg total) by mouth once daily. 90 tablet 5    thiamine 100 MG tablet Take 1 tablet (100 mg total) by mouth once daily.       No current facility-administered medications for this visit.      Allergies: Review of patient's allergies indicates no known allergies.     ROS:  Constitutional: no weight loss, weight gain, fever, fatigue  Eyes:  No vision changes, glasses/contacts  ENT/Mouth: No ulcers, sinus problems, ears ringing, headache  Cardiovascular: No inability to lie flat, chest pain, exercise intolerance, swelling, heart palpitations  Respiratory: No wheezing, coughing blood, shortness of breath, or cough  Gastrointestinal: No diarrhea, bloody stool, nausea/vomiting,  capful by mouth daily as needed   Yes Unknown, Entered By History   QUEtiapine (SEROQUEL) 25 MG tablet Take 1 tablet (25 mg) by mouth At Bedtime 12/3/2019 at am Yes Dillon Chatterjee MD   tamsulosin (FLOMAX) 0.4 MG capsule Take 0.4 mg by mouth daily 12/3/2019 at am Yes Unknown, Entered By History   traMADol (ULTRAM) 50 MG tablet Take 1 tablet (50 mg) by mouth every 4 hours as needed for moderate pain  Yes Papo Lopez MD   aspirin 81 MG EC tablet Take one tablet daily 2 weeks ago due to teeth pulled  Eric Avalos MD   nitroglycerin (NITROSTAT) 0.4 MG SL tablet Place 1 tablet (0.4 mg) under the tongue every 5 minutes as needed for chest pain   Laura Valerio, APRN CNP   order for DME Equipment being ordered: Hospital bed and bedside commode   Dillon Chatterjee MD            "constipation, gas, hemorrhoids  Genitourinary: No blood in urine, painful urination, urgency of urination, frequency of urination, incomplete emptying, incontinence, abnormal bleeding, painful periods, heavy periods, vaginal discharge, vaginal odor, painful intercourse, sexual problems, bleeding after intercourse.  Musculoskeletal: No muscle weakness  Skin/Breast: No painful breasts, nipple discharge, masses, rash, ulcers  Neurological: No passing out, seizures, numbness, headache  Endocrine: No diabetes, hypothyroid, hyperthyroid, hot flashes, hair loss, abnormal hair growth, ance  Psychiatric: No depression, crying  Hematologic: No bruises, bleeding, swollen lymph nodes, anemia.      OBJECTIVE:   The patient appears well, alert, oriented x 3, in no distress.  /70   Ht 5' 5" (1.651 m)   Wt 73.2 kg (161 lb 6 oz)   BMI 26.85 kg/m²   NECK: no thyromegaly, trachea midline  SKIN: no acne, striae, hirsutism  CHEST: CTAB  CV: RRR  BREAST EXAM: breasts appear normal, no suspicious masses, no skin or nipple changes or axillary nodes  ABDOMEN: no hernias, masses, or hepatosplenomegaly  GENITALIA: normal external genitalia, no erythema, no discharge  URETHRA: normal urethra, normal urethral meatus  VAGINA: Normal  CERVIX: no lesions or cervical motion tenderness  UTERUS: 12 week size, irregular, non-tender  ADNEXA: no mass, fullness, tenderness      ASSESSMENT:   1. Intramural and subserous leiomyoma of uterus         PLAN:       Discussed asymptomatic uterine fibroids, mgt options if sx.  Return to clinic prn  "

## 2019-12-05 ENCOUNTER — HOSPITAL ENCOUNTER (OUTPATIENT)
Dept: RADIOLOGY | Facility: OTHER | Age: 84
Discharge: HOME | End: 2019-12-05
Attending: INTERNAL MEDICINE
Payer: MEDICARE

## 2019-12-05 DIAGNOSIS — E04.2 MULTINODULAR GOITER: ICD-10-CM

## 2019-12-05 PROCEDURE — 76536 US EXAM OF HEAD AND NECK: CPT | Mod: 26,,, | Performed by: RADIOLOGY

## 2019-12-05 PROCEDURE — 76536 US EXAM OF HEAD AND NECK: CPT | Mod: TC

## 2019-12-05 PROCEDURE — 76536 US SOFT TISSUE HEAD NECK THYROID: ICD-10-PCS | Mod: 26,,, | Performed by: RADIOLOGY

## 2019-12-07 ENCOUNTER — PATIENT OUTREACH (OUTPATIENT)
Dept: ADMINISTRATIVE | Facility: OTHER | Age: 84
End: 2019-12-07

## 2019-12-09 ENCOUNTER — PATIENT MESSAGE (OUTPATIENT)
Dept: ENDOCRINOLOGY | Facility: CLINIC | Age: 84
End: 2019-12-09

## 2019-12-09 ENCOUNTER — LAB VISIT (OUTPATIENT)
Dept: LAB | Facility: HOSPITAL | Age: 84
End: 2019-12-09
Attending: INTERNAL MEDICINE
Payer: MEDICARE

## 2019-12-09 ENCOUNTER — OFFICE VISIT (OUTPATIENT)
Dept: PULMONOLOGY | Facility: CLINIC | Age: 84
End: 2019-12-09
Payer: MEDICARE

## 2019-12-09 VITALS
SYSTOLIC BLOOD PRESSURE: 134 MMHG | BODY MASS INDEX: 31.36 KG/M2 | OXYGEN SATURATION: 96 % | DIASTOLIC BLOOD PRESSURE: 74 MMHG | HEART RATE: 76 BPM | HEIGHT: 63 IN | WEIGHT: 177 LBS

## 2019-12-09 DIAGNOSIS — Z79.899 HIGH RISK MEDICATION USE: ICD-10-CM

## 2019-12-09 DIAGNOSIS — M05.79 SEROPOSITIVE RHEUMATOID ARTHRITIS OF MULTIPLE SITES: ICD-10-CM

## 2019-12-09 DIAGNOSIS — J84.116 CRYPTOGENIC ORGANIZING PNEUMONIA: ICD-10-CM

## 2019-12-09 DIAGNOSIS — E04.2 MULTINODULAR GOITER: Primary | ICD-10-CM

## 2019-12-09 DIAGNOSIS — R60.9 EDEMA, UNSPECIFIED TYPE: ICD-10-CM

## 2019-12-09 DIAGNOSIS — I27.23 PULMONARY HYPERTENSION DUE TO INTERSTITIAL LUNG DISEASE: ICD-10-CM

## 2019-12-09 DIAGNOSIS — J84.9 PULMONARY HYPERTENSION DUE TO INTERSTITIAL LUNG DISEASE: ICD-10-CM

## 2019-12-09 LAB
ALBUMIN SERPL BCP-MCNC: 3.6 G/DL (ref 3.5–5.2)
ALP SERPL-CCNC: 60 U/L (ref 55–135)
ALT SERPL W/O P-5'-P-CCNC: 14 U/L (ref 10–44)
ANION GAP SERPL CALC-SCNC: 7 MMOL/L (ref 8–16)
ANION GAP SERPL CALC-SCNC: 7 MMOL/L (ref 8–16)
AST SERPL-CCNC: 16 U/L (ref 10–40)
BASOPHILS # BLD AUTO: 0.03 K/UL (ref 0–0.2)
BASOPHILS NFR BLD: 0.3 % (ref 0–1.9)
BILIRUB SERPL-MCNC: 0.6 MG/DL (ref 0.1–1)
BUN SERPL-MCNC: 21 MG/DL (ref 8–23)
BUN SERPL-MCNC: 21 MG/DL (ref 8–23)
CALCIUM SERPL-MCNC: 8.7 MG/DL (ref 8.7–10.5)
CALCIUM SERPL-MCNC: 8.7 MG/DL (ref 8.7–10.5)
CHLORIDE SERPL-SCNC: 105 MMOL/L (ref 95–110)
CHLORIDE SERPL-SCNC: 105 MMOL/L (ref 95–110)
CO2 SERPL-SCNC: 31 MMOL/L (ref 23–29)
CO2 SERPL-SCNC: 31 MMOL/L (ref 23–29)
CREAT SERPL-MCNC: 1.2 MG/DL (ref 0.5–1.4)
CREAT SERPL-MCNC: 1.2 MG/DL (ref 0.5–1.4)
CRP SERPL-MCNC: 1.5 MG/L (ref 0–8.2)
DIFFERENTIAL METHOD: ABNORMAL
EOSINOPHIL # BLD AUTO: 0.1 K/UL (ref 0–0.5)
EOSINOPHIL NFR BLD: 0.8 % (ref 0–8)
ERYTHROCYTE [DISTWIDTH] IN BLOOD BY AUTOMATED COUNT: 14.7 % (ref 11.5–14.5)
ERYTHROCYTE [SEDIMENTATION RATE] IN BLOOD BY WESTERGREN METHOD: <2 MM/HR (ref 0–36)
EST. GFR  (AFRICAN AMERICAN): 48.6 ML/MIN/1.73 M^2
EST. GFR  (AFRICAN AMERICAN): 48.6 ML/MIN/1.73 M^2
EST. GFR  (NON AFRICAN AMERICAN): 42.2 ML/MIN/1.73 M^2
EST. GFR  (NON AFRICAN AMERICAN): 42.2 ML/MIN/1.73 M^2
GLUCOSE SERPL-MCNC: 104 MG/DL (ref 70–110)
GLUCOSE SERPL-MCNC: 104 MG/DL (ref 70–110)
HCT VFR BLD AUTO: 43.4 % (ref 37–48.5)
HGB BLD-MCNC: 12.6 G/DL (ref 12–16)
IMM GRANULOCYTES # BLD AUTO: 0.06 K/UL (ref 0–0.04)
IMM GRANULOCYTES NFR BLD AUTO: 0.6 % (ref 0–0.5)
LYMPHOCYTES # BLD AUTO: 2.8 K/UL (ref 1–4.8)
LYMPHOCYTES NFR BLD: 28.2 % (ref 18–48)
MCH RBC QN AUTO: 26.9 PG (ref 27–31)
MCHC RBC AUTO-ENTMCNC: 29 G/DL (ref 32–36)
MCV RBC AUTO: 93 FL (ref 82–98)
MONOCYTES # BLD AUTO: 1.6 K/UL (ref 0.3–1)
MONOCYTES NFR BLD: 16 % (ref 4–15)
NEUTROPHILS # BLD AUTO: 5.3 K/UL (ref 1.8–7.7)
NEUTROPHILS NFR BLD: 54.1 % (ref 38–73)
NRBC BLD-RTO: 0 /100 WBC
PLATELET # BLD AUTO: 144 K/UL (ref 150–350)
PMV BLD AUTO: 10.4 FL (ref 9.2–12.9)
POTASSIUM SERPL-SCNC: 4.3 MMOL/L (ref 3.5–5.1)
POTASSIUM SERPL-SCNC: 4.3 MMOL/L (ref 3.5–5.1)
PROT SERPL-MCNC: 6.1 G/DL (ref 6–8.4)
RBC # BLD AUTO: 4.68 M/UL (ref 4–5.4)
SODIUM SERPL-SCNC: 143 MMOL/L (ref 136–145)
SODIUM SERPL-SCNC: 143 MMOL/L (ref 136–145)
WBC # BLD AUTO: 9.8 K/UL (ref 3.9–12.7)

## 2019-12-09 PROCEDURE — 1159F MED LIST DOCD IN RCRD: CPT | Mod: ,,, | Performed by: INTERNAL MEDICINE

## 2019-12-09 PROCEDURE — 36415 COLL VENOUS BLD VENIPUNCTURE: CPT

## 2019-12-09 PROCEDURE — 99213 OFFICE O/P EST LOW 20 MIN: CPT | Mod: S$PBB,,, | Performed by: INTERNAL MEDICINE

## 2019-12-09 PROCEDURE — 99213 PR OFFICE/OUTPT VISIT, EST, LEVL III, 20-29 MIN: ICD-10-PCS | Mod: S$PBB,,, | Performed by: INTERNAL MEDICINE

## 2019-12-09 PROCEDURE — 99999 PR PBB SHADOW E&M-EST. PATIENT-LVL IV: CPT | Mod: PBBFAC,,, | Performed by: INTERNAL MEDICINE

## 2019-12-09 PROCEDURE — 85652 RBC SED RATE AUTOMATED: CPT

## 2019-12-09 PROCEDURE — 1126F PR PAIN SEVERITY QUANTIFIED, NO PAIN PRESENT: ICD-10-PCS | Mod: ,,, | Performed by: INTERNAL MEDICINE

## 2019-12-09 PROCEDURE — 99999 PR PBB SHADOW E&M-EST. PATIENT-LVL IV: ICD-10-PCS | Mod: PBBFAC,,, | Performed by: INTERNAL MEDICINE

## 2019-12-09 PROCEDURE — 1159F PR MEDICATION LIST DOCUMENTED IN MEDICAL RECORD: ICD-10-PCS | Mod: ,,, | Performed by: INTERNAL MEDICINE

## 2019-12-09 PROCEDURE — 1126F AMNT PAIN NOTED NONE PRSNT: CPT | Mod: ,,, | Performed by: INTERNAL MEDICINE

## 2019-12-09 PROCEDURE — 85025 COMPLETE CBC W/AUTO DIFF WBC: CPT

## 2019-12-09 PROCEDURE — 80053 COMPREHEN METABOLIC PANEL: CPT

## 2019-12-09 PROCEDURE — 86140 C-REACTIVE PROTEIN: CPT

## 2019-12-09 PROCEDURE — 99214 OFFICE O/P EST MOD 30 MIN: CPT | Mod: PBBFAC | Performed by: INTERNAL MEDICINE

## 2019-12-09 NOTE — TELEPHONE ENCOUNTER
US reviewed:      Right lower 4.3x3.5x3.7 cm, solid, hypoechoic. 55.685 cm^3    Left upper 1.8x0.9x1.5 cm, hyperechoic. 2.43 cm^3    Left mid 1.4x1.0.9 cm hyperechoic. Doesn't meet FNA criteria.    Prior US: 8/2015   Right side 3.5x3.24x3.25 cm nodule, isoechoic at that time. 36.855 cm^3   Left side: 1.06x0.74x0.95 cm nodule. 0.66071 cm^3      Comparison:   Large right side nodule is now >4 cm, hypoechoic rather than isoechoic/more mixed. Change in character. Size increased 51% in volume. Repeat FNA. At this side, could even consider lobectomy if pt interested but would discuss after FNA.    Left side 1.8 cm nodule, increased in size over 200%. Repeat FNA.

## 2019-12-10 ENCOUNTER — PATIENT MESSAGE (OUTPATIENT)
Dept: SPINE | Facility: CLINIC | Age: 84
End: 2019-12-10

## 2019-12-10 ENCOUNTER — PATIENT MESSAGE (OUTPATIENT)
Dept: CARDIOLOGY | Facility: HOSPITAL | Age: 84
End: 2019-12-10

## 2019-12-10 DIAGNOSIS — M19.011 PRIMARY OSTEOARTHRITIS OF BOTH SHOULDERS: ICD-10-CM

## 2019-12-10 DIAGNOSIS — M19.012 PRIMARY OSTEOARTHRITIS OF BOTH SHOULDERS: ICD-10-CM

## 2019-12-10 DIAGNOSIS — M47.812 SPONDYLOSIS OF CERVICAL REGION WITHOUT MYELOPATHY OR RADICULOPATHY: ICD-10-CM

## 2019-12-10 DIAGNOSIS — R53.81 PHYSICAL DECONDITIONING: Primary | ICD-10-CM

## 2019-12-10 DIAGNOSIS — G25.5 HEMICHOREA: ICD-10-CM

## 2019-12-10 DIAGNOSIS — G24.3 ISOLATED CERVICAL DYSTONIA: ICD-10-CM

## 2019-12-11 ENCOUNTER — PATIENT MESSAGE (OUTPATIENT)
Dept: SPINE | Facility: CLINIC | Age: 84
End: 2019-12-11

## 2019-12-11 NOTE — TELEPHONE ENCOUNTER
Occupational therapy orders will be sent to [facility] for ADL, transfers, UE strengthening and ROM

## 2019-12-12 NOTE — ASSESSMENT & PLAN NOTE
-- Etiology unclear  --ID recommend  ---- complete 7 day zosyn course as planned today for possible HAP/aspiration   ---- stopped posaconazole (mold species coverage)   ---- all fungal markers are negative  ---  continue steroids for now (cannot definitively exclude /HP)  ---- BAL cultures negative    ---- await pending repeat cultures from 4/19 to exclude new nosocomial infection -NGTD  ----  bactrim prophylaxis reinstated per ID recs  - Pt currently with some chills, and still elevated WBC count (possibly due to steroids), no fevers    week(s)

## 2019-12-13 ENCOUNTER — PATIENT MESSAGE (OUTPATIENT)
Dept: NEUROLOGY | Facility: CLINIC | Age: 84
End: 2019-12-13

## 2019-12-13 DIAGNOSIS — F41.9 ANXIETY: Primary | ICD-10-CM

## 2019-12-17 ENCOUNTER — PES CALL (OUTPATIENT)
Dept: ADMINISTRATIVE | Facility: CLINIC | Age: 84
End: 2019-12-17

## 2019-12-17 ENCOUNTER — PATIENT MESSAGE (OUTPATIENT)
Dept: SPINE | Facility: CLINIC | Age: 84
End: 2019-12-17

## 2019-12-17 DIAGNOSIS — G25.5 HEMICHOREA: ICD-10-CM

## 2019-12-17 DIAGNOSIS — M19.011 PRIMARY OSTEOARTHRITIS OF BOTH SHOULDERS: ICD-10-CM

## 2019-12-17 DIAGNOSIS — M19.012 PRIMARY OSTEOARTHRITIS OF BOTH SHOULDERS: ICD-10-CM

## 2019-12-17 DIAGNOSIS — G24.3 ISOLATED CERVICAL DYSTONIA: ICD-10-CM

## 2019-12-17 DIAGNOSIS — R53.81 PHYSICAL DECONDITIONING: ICD-10-CM

## 2019-12-17 DIAGNOSIS — M47.812 SPONDYLOSIS OF CERVICAL REGION WITHOUT MYELOPATHY OR RADICULOPATHY: Primary | ICD-10-CM

## 2019-12-17 NOTE — TELEPHONE ENCOUNTER
Continues to have hip pain.  OT orders for [facility] for ADL, transfers and shoulder and UE strengthening

## 2019-12-18 RX ORDER — DIAZEPAM 5 MG/1
5 TABLET ORAL DAILY PRN
Qty: 90 TABLET | Refills: 0 | Status: ON HOLD | OUTPATIENT
Start: 2019-12-18 | End: 2020-03-10 | Stop reason: HOSPADM

## 2019-12-19 ENCOUNTER — PATIENT MESSAGE (OUTPATIENT)
Dept: NEUROLOGY | Facility: CLINIC | Age: 84
End: 2019-12-19

## 2019-12-19 ENCOUNTER — PATIENT MESSAGE (OUTPATIENT)
Dept: SPINE | Facility: CLINIC | Age: 84
End: 2019-12-19

## 2019-12-19 RX ORDER — TRAMADOL HYDROCHLORIDE 50 MG/1
50 TABLET ORAL EVERY 6 HOURS PRN
Qty: 90 TABLET | Refills: 0 | Status: SHIPPED | OUTPATIENT
Start: 2019-12-19 | End: 2020-01-22 | Stop reason: SDUPTHER

## 2019-12-20 NOTE — TELEPHONE ENCOUNTER
Diazepam 5mg tab-1 week supply- Phoned into [facility]'s pharmacy. Spoke to Lyn pharmacist.    Patient notified.

## 2019-12-23 ENCOUNTER — PES CALL (OUTPATIENT)
Dept: ADMINISTRATIVE | Facility: CLINIC | Age: 84
End: 2019-12-23

## 2019-12-30 PROBLEM — J18.9 CAP (COMMUNITY ACQUIRED PNEUMONIA): Status: RESOLVED | Noted: 2019-03-14 | Resolved: 2019-12-30

## 2019-12-30 NOTE — PROGRESS NOTES
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  She has been hospitalized at least three times for acute hypoxemic respiratory failure with bilateral infiltrates (April 2018, October 2018, and March 2019).  Each time she required supplemental oxygen for an extended time, along with increased steroid dosing.  Each time her prednisone dose has been ~15 mg or so, she has had significant respiratory exacerbations with hypoxemia and bilateral infiltrates on CXR.  Work up has been non-specific from a serologic and culture standpoint.  She has also had other hospitalizations for unexplained neurologic symptoms presumed due to "encephalopathy."      Since her last Pulmonary Clinic visit in August 2019, she has been able to reduce her prednisone dose somewhat since starting mycophenolate.  She reports dyspnea with exertion but no significant cough.  Resting SpO2 is 96% while breathing room air and she denies significant desaturation with her home pulse oximeter.  She is relatively stable from a pulmonary standpoint and may be a little stronger at this visit.  Today she is in clinic without her daughter.    Review of Systems   Constitutional: Positive for fatigue. Negative for fever, chills, weight loss, activity change and night sweats.   Respiratory: Positive for shortness of breath and dyspnea on extertion. Negative for cough, hemoptysis, sputum production, chest tightness, wheezing, previous hospitialization due to pulmonary problems, pleurisy and use of rescue inhaler.    Cardiovascular: Positive for leg swelling. Negative for chest pain and palpitations.   Gastrointestinal: Negative for acid reflux.   Neurological: Positive for weakness. Negative for light-headedness.       Objective:      Physical " Exam   Constitutional: She is oriented to person, place, and time. No distress.   Cardiovascular: Normal rate, regular rhythm and normal heart sounds. Exam reveals no gallop.   No murmur heard.  Pulmonary/Chest: Normal expansion, symmetric chest wall expansion, effort normal and breath sounds normal. No stridor. No respiratory distress. She has no decreased breath sounds. She has no wheezes. She has no rhonchi. She has no rales.   Musculoskeletal: She exhibits edema (1+ bilaterally).   Neurological: She is alert and oriented to person, place, and time.   In a wheelchair at this visit.   Skin: No cyanosis. Nails show no clubbing.   Psychiatric: She has a normal mood and affect. Judgment normal.   Nursing note and vitals reviewed.    Personal Diagnostic Review    Last CXR from 8/2019 was personally reviewed => It showed clear lung fields without active pulmonary infiltrates.      Assessment:       1. Cryptogenic organizing pneumonia    2. Pulmonary hypertension due to interstitial lung disease        Outpatient Encounter Medications as of 12/9/2019   Medication Sig Dispense Refill    acetaminophen (TYLENOL) 325 MG tablet Take 2 tablets (650 mg total) by mouth every 6 (six) hours as needed.  0    albuterol (PROVENTIL/VENTOLIN HFA) 90 mcg/actuation inhaler Inhale 2 puffs into the lungs every 6 (six) hours as needed for Wheezing. Rescue 1 Inhaler 0    amLODIPine (NORVASC) 10 MG tablet Take 1 tablet (10 mg total) by mouth once daily. 90 tablet 0    aspirin (ECOTRIN) 81 MG EC tablet Take 81 mg by mouth once daily.      atorvastatin (LIPITOR) 40 MG tablet Take 1 tablet (40 mg total) by mouth once daily. 30 tablet 0    baclofen (LIORESAL) 10 MG tablet Take 10 mg by mouth 3 (three) times daily as needed.      carvedilol (COREG) 12.5 MG tablet Take 1 tablet (12.5 mg total) by mouth 2 (two) times daily with meals. 180 tablet 3    clopidogrel (PLAVIX) 75 mg tablet Take 1 tablet (75 mg total) by mouth once daily. 90  tablet 1    furosemide (LASIX) 40 MG tablet TAKE 1 TABLET(40 MG) BY MOUTH TWICE DAILY (Patient taking differently: 20 mg. TAKE 1 TABLET(40 MG) BY MOUTH TWICE DAILY) 60 tablet 0    gabapentin (NEURONTIN) 100 MG capsule Take 3 capsules (300 mg total) by mouth 2 (two) times daily. 360 capsule 2    gabapentin (NEURONTIN) 300 MG capsule Take 300 mg by mouth every evening.      hydroxychloroquine (PLAQUENIL) 200 mg tablet Take 1 tablet (200 mg total) by mouth 2 (two) times daily. 180 tablet 1    montelukast (SINGULAIR) 10 mg tablet Take 1 tablet (10 mg total) by mouth every evening. 90 tablet 0    mycophenolate (CELLCEPT) 500 mg Tab Take 1 tablet (500 mg total) by mouth 2 (two) times daily. 180 tablet 0    omeprazole (PRILOSEC) 20 MG capsule Take 1 capsule (20 mg total) by mouth once daily. 90 capsule 0    potassium chloride SA (K-DUR,KLOR-CON) 10 MEQ tablet Take 2 tablets (20 mEq total) by mouth once daily. 180 tablet 0    predniSONE (DELTASONE) 5 MG tablet Take 2 tablets (10 mg total) by mouth once daily. (Patient taking differently: Take 10 mg by mouth once daily. ) 180 tablet 1     Facility-Administered Encounter Medications as of 12/9/2019   Medication Dose Route Frequency Provider Last Rate Last Dose    onabotulinumtoxina injection 200 Units  200 Units Intramuscular Q90 Days Baldomero Giang MD   100 Units at 10/17/19 1835     No orders of the defined types were placed in this encounter.      Plan:   Continue current mycophenolate dose with goal to wean prednisone gradually to closer to 10 mg daily.    Problem List Items Addressed This Visit     Cryptogenic organizing pneumonia    Overview     Clinically suspected due to recurring infiltrates on CXR in the setting of rheumatoid arthritis.  Work up for infection has been negative and the clinical picture is less consistent with acute infection.  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         Current Assessment & Plan     Presently stable from symptom and pulse oximetry standpoint.  Last CXR in August 2019 was clear.    · Continue current mycophenolate dose with ongoing efforts to reduce prednisone dose gradually to a goal dose of 10 mg daily (or less) if possible.         Pulmonary hypertension due to interstitial lung disease    Overview     Past echocardiograms during pulmonary exacerbations have shown mildly elevated PA pressures (37 mmHg maximum).  Most recent echocardiogram in August 2019 showed decreased LVEF at 40% with some evidence of left-sided heart disease.      The clinical picture currently is more suggestive of WHO 2 pulmonary hypertension that is pretty mild.  There may be an element of WHO 3 pulmonary hypertension at times of more deranged gas exchange.         Current Assessment & Plan     · Continue diuretics as needed given recurring lower extremity edema and echocardiogram findings from August 2019.             Return to Pulmonary Clinic in 3-4 months.      Baldomero Francis MD  Pulmonary/Critical Care Medicine

## 2019-12-31 ENCOUNTER — EXTERNAL CHRONIC CARE MANAGEMENT (OUTPATIENT)
Dept: PRIMARY CARE CLINIC | Facility: CLINIC | Age: 84
End: 2019-12-31
Payer: MEDICARE

## 2019-12-31 PROCEDURE — 99490 PR CHRONIC CARE MGMT, 1ST 20 MIN: ICD-10-PCS | Mod: S$PBB,,, | Performed by: FAMILY MEDICINE

## 2019-12-31 PROCEDURE — 99490 CHRNC CARE MGMT STAFF 1ST 20: CPT | Mod: PBBFAC | Performed by: FAMILY MEDICINE

## 2019-12-31 PROCEDURE — 99490 CHRNC CARE MGMT STAFF 1ST 20: CPT | Mod: S$PBB,,, | Performed by: FAMILY MEDICINE

## 2019-12-31 NOTE — ASSESSMENT & PLAN NOTE
Presently stable from symptom and pulse oximetry standpoint.  Last CXR in August 2019 was clear.    · Continue current mycophenolate dose with ongoing efforts to reduce prednisone dose gradually to a goal dose of 10 mg daily (or less) if possible.

## 2019-12-31 NOTE — ASSESSMENT & PLAN NOTE
· Continue diuretics as needed given recurring lower extremity edema and echocardiogram findings from August 2019.

## 2020-01-01 NOTE — PT/OT/SLP PROGRESS
Physical Therapy      Patient Name:  Selma Lux MD   MRN:  [id number]    Patient not seen today. RN hold 2nd to pt with increased agitation. Will follow-up when appropriate.      Xiao Preciado, PT, DPT  4/20/2018  [phone]     Never smoker

## 2020-01-02 ENCOUNTER — DOCUMENTATION ONLY (OUTPATIENT)
Dept: INTERNAL MEDICINE | Facility: CLINIC | Age: 85
End: 2020-01-02

## 2020-01-03 ENCOUNTER — PATIENT OUTREACH (OUTPATIENT)
Dept: ADMINISTRATIVE | Facility: OTHER | Age: 85
End: 2020-01-03

## 2020-01-06 ENCOUNTER — OFFICE VISIT (OUTPATIENT)
Dept: RHEUMATOLOGY | Facility: CLINIC | Age: 85
End: 2020-01-06
Payer: MEDICARE

## 2020-01-06 VITALS
SYSTOLIC BLOOD PRESSURE: 112 MMHG | HEIGHT: 65 IN | BODY MASS INDEX: 31.55 KG/M2 | DIASTOLIC BLOOD PRESSURE: 59 MMHG | WEIGHT: 189.38 LBS | HEART RATE: 77 BPM

## 2020-01-06 DIAGNOSIS — M05.79 SEROPOSITIVE RHEUMATOID ARTHRITIS OF MULTIPLE SITES: Primary | ICD-10-CM

## 2020-01-06 DIAGNOSIS — J84.116 CRYPTOGENIC ORGANIZING PNEUMONIA: ICD-10-CM

## 2020-01-06 PROBLEM — R06.00 DYSPNEA: Status: RESOLVED | Noted: 2019-08-30 | Resolved: 2020-01-06

## 2020-01-06 PROCEDURE — 99999 PR PBB SHADOW E&M-EST. PATIENT-LVL III: ICD-10-PCS | Mod: PBBFAC,,, | Performed by: INTERNAL MEDICINE

## 2020-01-06 PROCEDURE — 1159F MED LIST DOCD IN RCRD: CPT | Mod: ,,, | Performed by: INTERNAL MEDICINE

## 2020-01-06 PROCEDURE — 99213 OFFICE O/P EST LOW 20 MIN: CPT | Mod: PBBFAC | Performed by: INTERNAL MEDICINE

## 2020-01-06 PROCEDURE — 1126F PR PAIN SEVERITY QUANTIFIED, NO PAIN PRESENT: ICD-10-PCS | Mod: ,,, | Performed by: INTERNAL MEDICINE

## 2020-01-06 PROCEDURE — 1126F AMNT PAIN NOTED NONE PRSNT: CPT | Mod: ,,, | Performed by: INTERNAL MEDICINE

## 2020-01-06 PROCEDURE — 1159F PR MEDICATION LIST DOCUMENTED IN MEDICAL RECORD: ICD-10-PCS | Mod: ,,, | Performed by: INTERNAL MEDICINE

## 2020-01-06 PROCEDURE — 99999 PR PBB SHADOW E&M-EST. PATIENT-LVL III: CPT | Mod: PBBFAC,,, | Performed by: INTERNAL MEDICINE

## 2020-01-06 PROCEDURE — 99214 PR OFFICE/OUTPT VISIT, EST, LEVL IV, 30-39 MIN: ICD-10-PCS | Mod: S$PBB,,, | Performed by: INTERNAL MEDICINE

## 2020-01-06 PROCEDURE — 99214 OFFICE O/P EST MOD 30 MIN: CPT | Mod: S$PBB,,, | Performed by: INTERNAL MEDICINE

## 2020-01-06 ASSESSMENT — ROUTINE ASSESSMENT OF PATIENT INDEX DATA (RAPID3)
PAIN SCORE: 1
TOTAL RAPID3 SCORE: 2.33
PATIENT GLOBAL ASSESSMENT SCORE: 3
FATIGUE SCORE: 0
MDHAQ FUNCTION SCORE: .9
AM STIFFNESS SCORE: 0, NO
PSYCHOLOGICAL DISTRESS SCORE: 0

## 2020-01-06 NOTE — PROGRESS NOTES
"Subjective:       Patient ID: Selma Rosado MD Jose J is a 84 y.o. female.    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   HENT: Negative for trouble swallowing.    Eyes: Negative for redness.   Respiratory: Positive for cough and shortness of breath.    Cardiovascular: Negative for chest pain.   Gastrointestinal: Negative for constipation and diarrhea.   Genitourinary: Negative for dysuria and genital sores.   Neurological: Negative for headaches.   Hematological: Does not bruise/bleed easily.        Bruises on back         Objective:   BP (!) 112/59   Pulse 77   Ht 5' 5" (1.651 m)   Wt 85.9 kg (189 lb 6 oz)   LMP  (LMP Unknown)   BMI 31.51 kg/m²      Physical Exam   Constitutional: She is well-developed, well-nourished, and in no distress.   HENT:   Mouth/Throat: Oropharynx is clear and moist.   Eyes: Conjunctivae are normal.   Cardiovascular: Normal rate and regular " rhythm.    LE edema   Pulmonary/Chest: Breath sounds normal. No respiratory distress. She has no wheezes. She has no rales.   Lymphadenopathy:     She has no cervical adenopathy.   Skin: No rash noted.           Lab Results   Component Value Date    SEDRATE <2 12/09/2019      Lab Results   Component Value Date    CRP 1.5 12/09/2019      Lab Results   Component Value Date    WBC 9.80 12/09/2019    HGB 12.6 12/09/2019    HCT 43.4 12/09/2019    MCV 93 12/09/2019     (L) 12/09/2019        Assessment:       1. Seropositive rheumatoid arthritis of multiple sites    2. Cryptogenic organizing pneumonia            Plan:       Problem List Items Addressed This Visit        Active Problems    Seropositive rheumatoid arthritis of multiple sites - Primary     RA is quiet on current Rx including HCQ, MMF, prednisone (the latter for )    Will cont HCQ         Cryptogenic organizing pneumonia     Symptomatically doing well on MMF plus prednisone    Will reduce prednisone to 10 mg /d  With plan to reduce to 5 mg/d in the future  Cont MMF to prevent progression of ILD possibly related to RA               lab next month  RTC with lab in April/May

## 2020-01-06 NOTE — PROGRESS NOTES
Rapid3 Question Responses and Scores 1/5/2020   MDHAQ Score 0.9   Psychologic Score 0   Pain Score 1   When you awakened in the morning OVER THE LAST WEEK, did you feel stiff? No   Fatigue Score 0   Global Health Score 3   RAPID3 Score 2.33

## 2020-01-06 NOTE — ASSESSMENT & PLAN NOTE
Symptomatically doing well on MMF plus prednisone    Will reduce prednisone to 10 mg /d  With plan to reduce to 5 mg/d in the future  Cont MMF to prevent progression of ILD possibly related to RA

## 2020-01-07 DIAGNOSIS — G24.3 CERVICAL DYSTONIA: Primary | ICD-10-CM

## 2020-01-09 RX ADMIN — ONABOTULINUMTOXINA 200 UNITS: 100 INJECTION, POWDER, LYOPHILIZED, FOR SOLUTION INTRADERMAL; INTRAMUSCULAR at 01:01

## 2020-01-12 ENCOUNTER — PATIENT OUTREACH (OUTPATIENT)
Dept: ADMINISTRATIVE | Facility: OTHER | Age: 85
End: 2020-01-12

## 2020-01-13 ENCOUNTER — OFFICE VISIT (OUTPATIENT)
Dept: INTERNAL MEDICINE | Facility: CLINIC | Age: 85
End: 2020-01-13
Payer: MEDICARE

## 2020-01-13 ENCOUNTER — PROCEDURE VISIT (OUTPATIENT)
Dept: NEUROLOGY | Facility: CLINIC | Age: 85
End: 2020-01-13
Payer: MEDICARE

## 2020-01-13 VITALS
HEART RATE: 81 BPM | DIASTOLIC BLOOD PRESSURE: 58 MMHG | SYSTOLIC BLOOD PRESSURE: 108 MMHG | BODY MASS INDEX: 31.16 KG/M2 | HEIGHT: 65 IN | WEIGHT: 187 LBS

## 2020-01-13 VITALS
HEIGHT: 65 IN | DIASTOLIC BLOOD PRESSURE: 58 MMHG | BODY MASS INDEX: 31.22 KG/M2 | WEIGHT: 187.38 LBS | HEART RATE: 81 BPM | SYSTOLIC BLOOD PRESSURE: 108 MMHG

## 2020-01-13 DIAGNOSIS — R13.12 OROPHARYNGEAL DYSPHAGIA: ICD-10-CM

## 2020-01-13 DIAGNOSIS — Z99.89 DEPENDENCE ON OTHER ENABLING MACHINES AND DEVICES: ICD-10-CM

## 2020-01-13 DIAGNOSIS — M16.12 PRIMARY OSTEOARTHRITIS OF LEFT HIP: ICD-10-CM

## 2020-01-13 DIAGNOSIS — I27.23 PULMONARY HYPERTENSION DUE TO INTERSTITIAL LUNG DISEASE: ICD-10-CM

## 2020-01-13 DIAGNOSIS — J84.116 CRYPTOGENIC ORGANIZING PNEUMONIA: ICD-10-CM

## 2020-01-13 DIAGNOSIS — M05.79 SEROPOSITIVE RHEUMATOID ARTHRITIS OF MULTIPLE SITES: ICD-10-CM

## 2020-01-13 DIAGNOSIS — J84.9 PULMONARY HYPERTENSION DUE TO INTERSTITIAL LUNG DISEASE: ICD-10-CM

## 2020-01-13 DIAGNOSIS — G24.9 DYSTONIA: ICD-10-CM

## 2020-01-13 DIAGNOSIS — D50.8 IRON DEFICIENCY ANEMIA SECONDARY TO INADEQUATE DIETARY IRON INTAKE: ICD-10-CM

## 2020-01-13 DIAGNOSIS — Z86.73 HISTORY OF STROKE: ICD-10-CM

## 2020-01-13 DIAGNOSIS — F01.50 VASCULAR DEMENTIA WITHOUT BEHAVIORAL DISTURBANCE: ICD-10-CM

## 2020-01-13 DIAGNOSIS — I48.0 AF (PAROXYSMAL ATRIAL FIBRILLATION): ICD-10-CM

## 2020-01-13 DIAGNOSIS — E04.2 MULTINODULAR GOITER: ICD-10-CM

## 2020-01-13 DIAGNOSIS — N18.30 CHRONIC RENAL FAILURE SYNDROME, STAGE 3 (MODERATE): ICD-10-CM

## 2020-01-13 DIAGNOSIS — E66.09 CLASS 1 OBESITY DUE TO EXCESS CALORIES WITH SERIOUS COMORBIDITY IN ADULT, UNSPECIFIED BMI: ICD-10-CM

## 2020-01-13 DIAGNOSIS — D84.9 IMMUNOSUPPRESSED STATUS: ICD-10-CM

## 2020-01-13 DIAGNOSIS — R26.9 ABNORMALITY OF GAIT AND MOBILITY: ICD-10-CM

## 2020-01-13 DIAGNOSIS — I67.89 CEREBRAL MICROVASCULAR DISEASE: ICD-10-CM

## 2020-01-13 DIAGNOSIS — R94.31 LONG QT INTERVAL: ICD-10-CM

## 2020-01-13 DIAGNOSIS — I10 HYPERTENSION, ESSENTIAL: ICD-10-CM

## 2020-01-13 DIAGNOSIS — K27.9 PUD (PEPTIC ULCER DISEASE): ICD-10-CM

## 2020-01-13 DIAGNOSIS — E55.9 HYPOVITAMINOSIS D: ICD-10-CM

## 2020-01-13 DIAGNOSIS — M16.4 POST-TRAUMATIC OSTEOARTHRITIS OF BOTH HIPS: ICD-10-CM

## 2020-01-13 DIAGNOSIS — Z86.73 HISTORY OF TRANSIENT ISCHEMIC ATTACK (TIA): ICD-10-CM

## 2020-01-13 DIAGNOSIS — R76.8 THYROID ANTIBODY POSITIVE: ICD-10-CM

## 2020-01-13 DIAGNOSIS — Z00.00 ENCOUNTER FOR PREVENTIVE HEALTH EXAMINATION: Primary | ICD-10-CM

## 2020-01-13 DIAGNOSIS — H35.012 RETINAL MACROANEURYSM OF LEFT EYE: ICD-10-CM

## 2020-01-13 DIAGNOSIS — E78.5 HYPERLIPIDEMIA, UNSPECIFIED HYPERLIPIDEMIA TYPE: ICD-10-CM

## 2020-01-13 DIAGNOSIS — D69.6 THROMBOCYTOPENIA: ICD-10-CM

## 2020-01-13 DIAGNOSIS — F39 MOOD DISORDER: ICD-10-CM

## 2020-01-13 DIAGNOSIS — I70.0 AORTIC ATHEROSCLEROSIS: ICD-10-CM

## 2020-01-13 PROBLEM — E66.811 CLASS 1 OBESITY DUE TO EXCESS CALORIES WITH SERIOUS COMORBIDITY IN ADULT: Status: ACTIVE | Noted: 2020-01-13

## 2020-01-13 PROBLEM — N17.9 AKI (ACUTE KIDNEY INJURY): Status: RESOLVED | Noted: 2019-03-13 | Resolved: 2020-01-13

## 2020-01-13 PROCEDURE — G0439 PPPS, SUBSEQ VISIT: HCPCS | Mod: S$GLB,,, | Performed by: NURSE PRACTITIONER

## 2020-01-13 PROCEDURE — 64616 CHEMODENERV MUSC NECK DYSTON: CPT | Mod: 50,PBBFAC | Performed by: PSYCHIATRY & NEUROLOGY

## 2020-01-13 PROCEDURE — 99215 OFFICE O/P EST HI 40 MIN: CPT | Mod: PBBFAC | Performed by: NURSE PRACTITIONER

## 2020-01-13 PROCEDURE — G0439 PR MEDICARE ANNUAL WELLNESS SUBSEQUENT VISIT: ICD-10-PCS | Mod: S$GLB,,, | Performed by: NURSE PRACTITIONER

## 2020-01-13 PROCEDURE — 64616 CHEMODENERV MUSC NECK DYSTON: CPT | Mod: S$PBB,LT,, | Performed by: PSYCHIATRY & NEUROLOGY

## 2020-01-13 PROCEDURE — 64616 PR CHEMODENERVATION NECK MUSCLES EXC LARNYNX, UNI: ICD-10-PCS | Mod: S$PBB,LT,, | Performed by: PSYCHIATRY & NEUROLOGY

## 2020-01-13 PROCEDURE — 99999 PR PBB SHADOW E&M-EST. PATIENT-LVL V: ICD-10-PCS | Mod: PBBFAC,,, | Performed by: NURSE PRACTITIONER

## 2020-01-13 PROCEDURE — 99999 PR PBB SHADOW E&M-EST. PATIENT-LVL V: CPT | Mod: PBBFAC,,, | Performed by: NURSE PRACTITIONER

## 2020-01-13 RX ORDER — LANOLIN ALCOHOL/MO/W.PET/CERES
100 CREAM (GRAM) TOPICAL DAILY
COMMUNITY

## 2020-01-13 RX ORDER — CLOPIDOGREL BISULFATE 75 MG/1
75 TABLET ORAL DAILY
Qty: 90 TABLET | Refills: 0 | Status: SHIPPED | OUTPATIENT
Start: 2020-01-13 | End: 2020-02-05

## 2020-01-13 RX ORDER — POTASSIUM CHLORIDE 750 MG/1
20 TABLET, EXTENDED RELEASE ORAL DAILY
Qty: 180 TABLET | Refills: 0 | Status: SHIPPED | OUTPATIENT
Start: 2020-01-13 | End: 2020-03-27

## 2020-01-13 RX ORDER — ATORVASTATIN CALCIUM 40 MG/1
40 TABLET, FILM COATED ORAL DAILY
Qty: 30 TABLET | Refills: 0 | Status: SHIPPED | OUTPATIENT
Start: 2020-01-13 | End: 2020-02-17

## 2020-01-13 RX ORDER — LANOLIN ALCOHOL/MO/W.PET/CERES
400 CREAM (GRAM) TOPICAL DAILY
COMMUNITY

## 2020-01-13 RX ORDER — AMLODIPINE BESYLATE 10 MG/1
10 TABLET ORAL DAILY
Qty: 90 TABLET | Refills: 0 | Status: SHIPPED | OUTPATIENT
Start: 2020-01-13 | End: 2020-01-21 | Stop reason: SDUPTHER

## 2020-01-13 RX ORDER — OMEPRAZOLE 20 MG/1
20 CAPSULE, DELAYED RELEASE ORAL DAILY
Qty: 90 CAPSULE | Refills: 0 | Status: SHIPPED | OUTPATIENT
Start: 2020-01-13 | End: 2020-04-15 | Stop reason: SDUPTHER

## 2020-01-13 NOTE — PATIENT INSTRUCTIONS
Counseling and Referral of Other Preventative  (Italic type indicates deductible and co-insurance are waived)    Patient Name: Selma Lux  Today's Date: 1/13/2020    Health Maintenance       Date Due Completion Date    Influenza Vaccine (1) 02/02/2020 (Originally 9/1/2019) 3/7/2018    Pneumococcal Vaccine (65+ High/Highest Risk) (2 of 2 - PPSV23) 02/03/2020 (Originally 5/2/2018) 3/7/2018    Shingles Vaccine (1 of 2) 01/13/2021 (Originally 9/21/1987) Consider obtaining Shingrix    DEXA SCAN 06/04/2022 6/4/2019    Lipid Panel 08/27/2024 8/27/2019    TETANUS VACCINE 03/07/2028 3/7/2018        No orders of the defined types were placed in this encounter.    The following information is provided to all patients.  This information is to help you find resources for any of the problems found today that may be affecting your health:                Living healthy guide: www.[facility].[place].[facility]      Understanding Diabetes: www.diabetes.org      Eating healthy: www.cdc.gov/healthyweight      CDC home safety checklist: www.cdc.gov/steadi/patient.html      Agency on Aging: www.goea.[place].gov      Alcoholics anonymous (AA): www.aa.org      Physical Activity: www.jihan.nih.gov/uu5rkor      Tobacco use: www.quitwithusla.org

## 2020-01-13 NOTE — PROGRESS NOTES
"Selma Lux presented for a  Medicare AWV and comprehensive Health Risk Assessment today. The following components were reviewed and updated:    · Medical history  · Family History  · Social history  · Allergies and Current Medications  · Health Risk Assessment  · Health Maintenance  · Care Team     ** See Completed Assessments for Annual Wellness Visit within the encounter summary.**       The following assessments were completed:  · Living Situation  · CAGE  · Depression Screening  · Timed Get Up and Go  · Whisper Test  · Cognitive Function Screening - not administered secondary to H/O dementia  · Nutrition Screening  · ADL Screening  · PAQ Screening    Vitals:    01/13/20 1354   BP: (!) 108/58   BP Location: Right arm   Pulse: 81   Weight: 85 kg (187 lb 6.3 oz)   Height: 5' 5" (1.651 m)     Body mass index is 31.18 kg/m².  Physical Exam      Diagnoses and health risks identified today and associated recommendations/orders:    1. Encounter for preventive health examination  Here for Health Risk Assessment/Annual Wellness Visit.  Health maintenance reviewed and updated. Follow up in one year.  Declined influenza, pneumonia, shingles vaccines.    2. Hypertension, essential  Chronic, stable on current medications. Followed by PCP.    3. Aortic atherosclerosis  Chronic, stable on current medications. Noted CXR 12/25/18. Followed by PCP.    4. AF (paroxysmal atrial fibrillation)  Chronic, stable on current medications. Followed by PCP, Cardiology.    5. Long QT interval  Chronic, stable. Followed by PCP, Cardiology.    6. Hyperlipidemia, unspecified hyperlipidemia type  Chronic, stable on current medications. Followed by PCP, Cardiology.    7. Pulmonary hypertension due to interstitial lung disease  Chronic, stable on current medications. Followed by PCP, Cardiology, Pulmonology.    8. Cryptogenic organizing pneumonia  Chronic, stable. Followed by PCP, Pulmonology.    9. History of stroke  Stable on current " medications. Followed by PCP.    10. Cerebral microvascular disease  Chronic, stable on current medications. Followed by PCP, Neurology.    11. History of transient ischemic attack (TIA)  Stable on current medications. Followed by PCP, Neurology.    12. Thrombocytopenia  Chronic, stable. Followed by PCP.    13. Dystonia  Chronic, stable on current therapy. Followed by PCP, Neurology.    14. Vascular dementia without behavioral disturbance  Chronic, stable. Lives with daughter who manages medications/finances, she does not drive. Followed by PCP, Neurology.    15. Mood disorder  Chronic, stable. PHQ-2 score 0. Followed by PCP.    16. Retinal macroaneurysm of left eye  Chronic, stable. Followed by Ophthalmology    17. Chronic renal failure syndrome, stage 3 (moderate)  Chronic, stable. Followed by PCP.    18. Seropositive rheumatoid arthritis of multiple sites  Chronic, stable on current medications. Followed by PCP, Rheumatology.    19. Immunosuppressed status  Chronic prednisone, currently weaning dose per Rheumatology. Followed by PCP.    20. Iron deficiency anemia secondary to inadequate dietary iron intake  Chronic, stable. Followed by PCP.    21. Hypovitaminosis D  Chronic, stable on current medication. Followed by PCP.    22. Multinodular goiter  Chronic, stable. Followed by PCP.    23. PUD (peptic ulcer disease)  Chronic, stable on current medication. Followed by PCP, Gastroenterology.    24. Oropharyngeal dysphagia  Chronic, stable. Followed by PCP, Gastroenterolgy.    25. Post-traumatic osteoarthritis of both hips  Chronic, reporting persistent hip pain. Followed by PCP, Orthopedics.    26. Primary osteoarthritis of left hip  Chronic, reporting persistent hip pain. Followed by PCP, Orthopedics.    27. Thyroid antibody positive  Chronic, stable. Followed by PCP.    28. Class 1 obesity due to excess calories with serious comorbidity in adult, unspecified BMI  Chronic, stable. Followed by PCP.    29. Dependence  on other enabling machines and devices  Chronic, ambulates with walker, reports multiple falls, currently undergoing PT. Followed by PCP.    30. Abnormality of gait and mobility  Chronic, ambulates with walker, reports multiple falls, currently undergoing PT. Followed by PCP.      Provided Selma with a 5-10 year written screening schedule and personal prevention plan. Recommendations were developed using the USPSTF age appropriate recommendations. Education, counseling, and referrals were provided as needed. After Visit Summary printed and given to patient which includes a list of additional screenings\tests needed.    Follow up in 5 weeks (on 2/17/2020).with PCP    Sadia Harvey NP

## 2020-01-20 ENCOUNTER — PATIENT MESSAGE (OUTPATIENT)
Dept: INTERNAL MEDICINE | Facility: CLINIC | Age: 85
End: 2020-01-20

## 2020-01-20 ENCOUNTER — PATIENT MESSAGE (OUTPATIENT)
Dept: SPINE | Facility: CLINIC | Age: 85
End: 2020-01-20

## 2020-01-21 ENCOUNTER — PATIENT MESSAGE (OUTPATIENT)
Dept: INTERNAL MEDICINE | Facility: CLINIC | Age: 85
End: 2020-01-21

## 2020-01-21 RX ORDER — AMLODIPINE BESYLATE 10 MG/1
10 TABLET ORAL DAILY
Qty: 90 TABLET | Refills: 0 | Status: ON HOLD | OUTPATIENT
Start: 2020-01-21 | End: 2020-03-10 | Stop reason: HOSPADM

## 2020-01-21 RX ORDER — MONTELUKAST SODIUM 10 MG/1
10 TABLET ORAL NIGHTLY
Qty: 90 TABLET | Refills: 0 | Status: SHIPPED | OUTPATIENT
Start: 2020-01-21 | End: 2020-04-15 | Stop reason: SDUPTHER

## 2020-01-21 NOTE — TELEPHONE ENCOUNTER
Requested medication refilled and sent by electronic prescription. Please call and notify the patient. Thank you.    Sent to [facility] listed in chart.  I went ahead with a 90 day supply as often, the price would be the same as a 30 day supply.  Please let us know if we need to modify that.

## 2020-01-22 ENCOUNTER — HOSPITAL ENCOUNTER (OUTPATIENT)
Dept: ENDOCRINOLOGY | Facility: CLINIC | Age: 85
Discharge: HOME | End: 2020-01-22
Attending: INTERNAL MEDICINE
Payer: MEDICARE

## 2020-01-22 ENCOUNTER — OFFICE VISIT (OUTPATIENT)
Dept: SPINE | Facility: CLINIC | Age: 85
End: 2020-01-22
Attending: PHYSICAL MEDICINE & REHABILITATION
Payer: MEDICARE

## 2020-01-22 VITALS
SYSTOLIC BLOOD PRESSURE: 127 MMHG | HEIGHT: 65 IN | BODY MASS INDEX: 31.16 KG/M2 | WEIGHT: 187 LBS | DIASTOLIC BLOOD PRESSURE: 64 MMHG | HEART RATE: 82 BPM

## 2020-01-22 DIAGNOSIS — M47.812 SPONDYLOSIS OF CERVICAL REGION WITHOUT MYELOPATHY OR RADICULOPATHY: ICD-10-CM

## 2020-01-22 DIAGNOSIS — M19.012 PRIMARY OSTEOARTHRITIS OF BOTH SHOULDERS: Primary | ICD-10-CM

## 2020-01-22 DIAGNOSIS — G24.3 ISOLATED CERVICAL DYSTONIA: ICD-10-CM

## 2020-01-22 DIAGNOSIS — E04.2 MULTINODULAR GOITER: ICD-10-CM

## 2020-01-22 DIAGNOSIS — M47.816 SPONDYLOSIS OF LUMBAR REGION WITHOUT MYELOPATHY OR RADICULOPATHY: ICD-10-CM

## 2020-01-22 DIAGNOSIS — M16.12 PRIMARY OSTEOARTHRITIS OF LEFT HIP: ICD-10-CM

## 2020-01-22 DIAGNOSIS — E04.1 THYROID NODULE: Primary | ICD-10-CM

## 2020-01-22 DIAGNOSIS — M19.011 PRIMARY OSTEOARTHRITIS OF BOTH SHOULDERS: Primary | ICD-10-CM

## 2020-01-22 PROCEDURE — 88173 PR  INTERPRETATION OF FNA SMEAR: ICD-10-PCS | Mod: 26,,, | Performed by: PATHOLOGY

## 2020-01-22 PROCEDURE — 99214 PR OFFICE/OUTPT VISIT, EST, LEVL IV, 30-39 MIN: ICD-10-PCS | Mod: S$PBB,,, | Performed by: PHYSICAL MEDICINE & REHABILITATION

## 2020-01-22 PROCEDURE — 1125F PR PAIN SEVERITY QUANTIFIED, PAIN PRESENT: ICD-10-PCS | Mod: ,,, | Performed by: PHYSICAL MEDICINE & REHABILITATION

## 2020-01-22 PROCEDURE — 10006 US FINE NEEDLE ASPIRATION THYROID EA ADDITIONAL LESION: ICD-10-PCS | Mod: ,,, | Performed by: INTERNAL MEDICINE

## 2020-01-22 PROCEDURE — 10005 US FINE NEEDLE ASPIRATION THYROID, FIRST LESION: ICD-10-PCS | Mod: ,,, | Performed by: INTERNAL MEDICINE

## 2020-01-22 PROCEDURE — 99999 PR PBB SHADOW E&M-EST. PATIENT-LVL III: CPT | Mod: PBBFAC,,, | Performed by: PHYSICAL MEDICINE & REHABILITATION

## 2020-01-22 PROCEDURE — 88173 CYTOPATH EVAL FNA REPORT: CPT | Mod: 26,,, | Performed by: PATHOLOGY

## 2020-01-22 PROCEDURE — 88173 CYTOPATH EVAL FNA REPORT: CPT | Mod: 59 | Performed by: PATHOLOGY

## 2020-01-22 PROCEDURE — 1159F MED LIST DOCD IN RCRD: CPT | Mod: ,,, | Performed by: PHYSICAL MEDICINE & REHABILITATION

## 2020-01-22 PROCEDURE — 99213 OFFICE O/P EST LOW 20 MIN: CPT | Mod: PBBFAC | Performed by: PHYSICAL MEDICINE & REHABILITATION

## 2020-01-22 PROCEDURE — 1159F PR MEDICATION LIST DOCUMENTED IN MEDICAL RECORD: ICD-10-PCS | Mod: ,,, | Performed by: PHYSICAL MEDICINE & REHABILITATION

## 2020-01-22 PROCEDURE — 88173 CYTOPATH EVAL FNA REPORT: CPT | Performed by: PATHOLOGY

## 2020-01-22 PROCEDURE — 1125F AMNT PAIN NOTED PAIN PRSNT: CPT | Mod: ,,, | Performed by: PHYSICAL MEDICINE & REHABILITATION

## 2020-01-22 PROCEDURE — 99214 OFFICE O/P EST MOD 30 MIN: CPT | Mod: S$PBB,,, | Performed by: PHYSICAL MEDICINE & REHABILITATION

## 2020-01-22 PROCEDURE — 10005 FNA BX W/US GDN 1ST LES: CPT | Mod: ,,, | Performed by: INTERNAL MEDICINE

## 2020-01-22 PROCEDURE — 99999 PR PBB SHADOW E&M-EST. PATIENT-LVL III: ICD-10-PCS | Mod: PBBFAC,,, | Performed by: PHYSICAL MEDICINE & REHABILITATION

## 2020-01-22 PROCEDURE — 10006 FNA BX W/US GDN EA ADDL: CPT | Mod: ,,, | Performed by: INTERNAL MEDICINE

## 2020-01-22 RX ORDER — TRAMADOL HYDROCHLORIDE 50 MG/1
50 TABLET ORAL EVERY 6 HOURS PRN
Qty: 90 TABLET | Refills: 0 | Status: ON HOLD | OUTPATIENT
Start: 2020-01-22 | End: 2020-03-10 | Stop reason: HOSPADM

## 2020-01-22 NOTE — PROGRESS NOTES
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    Chief Complaint: No chief complaint on file.     Lux is an 83 yo female here for follow up of her neck pain from 7-8 years that worsened in November 2017.  She was last seen by me on 11/13/2019 and she was going back to PT and she was having some hip pain.  She went for a left hip injection 11/22/2019.  she was doing better and moving better with botox, and she had repeat 1/13/2020.    She had trigger point in SCM on 1/17/2018.  Today, she has been having more pain in the morning.  She has a hard time getting out of bed in the left hip.  The injection did not help.  She feels like she is dismayed that she did not get relief.  She also has full back pain.  She points to the back as the place where it hurts today.  The lower back.  The pain is with getting up with sitting.  The pain does not move.  It does not have groin pain.  She has been going to PT and has she has been getting exercises.  She still has shoulder pain and arm pain.  She has pain going up the spine with walking.      MRI lumbar 3/13/2017  There is S-shaped scoliosis of the thoracolumbar spine, with dextroscoliosis of the thoracolumbar spine and levoscoliosis of the mid lumbar spine. The vertebral body heights are well maintained, with no fracture.  No marrow signal abnormality suspicious for an infiltrative process.  There is multilevel disc space height loss.     The conus is normal in appearance, and terminates at the L1 level.  There is a 2.2 cm perineural root sleeve cyst at the level of S3.      The adjacent soft tissue structures demonstrate presence of a 3.9 cm cyst within the right kidney.  There multiple uterine fibroids present.      There are findings of multi-level lumbar spondylosis, as below.    T12-L1: Left paracentral disc spur complex and bilateral facet arthropathy, resulting in mild spinal canal stenosis.  No significant neuroforaminal  narrowing.    L1-L2: Asymmetric left-sided broad based disc bulge and bilateral facet arthropathy, resulting in encroachment of the left lateral recess.  There is no significant spinal canal stenosis, and severe left-sided neuroforaminal narrowing.    L2-L3: Asymmetric right-sided broad-based disc bulge and facet arthropathy resulting in moderate spinal canal stenosis, and moderate/severe bilateral neuroforaminal narrowing.    L3-L4: Broad-based disc bulge and bilateral facet arthropathy resulting in moderate spinal canal stenosis, and moderate/severe  neuroforaminal narrowing.    L4-L5: Broad-based disc bulge and bilateral facet arthropathy, with ligamentous hypertrophy, resulting in severe spinal canal stenosis and severe right neuroforaminal narrowing.    L5-S1: Broad-based disc bulge and bilateral facet arthropathy, resulting in severe spinal canal stenosis and moderate left-sided neural foraminal narrowing.    Impression         Severe degenerative changes of the lumbar spine, noting severe spinal canal stenosis at L4-5 and L5-S1, as well as moderate spinal canal stenosis at L2-3 and L3-4.  Additional details as above.    S-shaped scoliosis of the thoracolumbar spine.    Perineural root sleeve cyst at the level of  S3.    Multiple uterine fibroids.    Simple right-sided renal cyst.      X-ray hips 3/11/2019  No evidence of acute fracture, dislocation or bone destruction.  Moderate degenerative changes of the hip joints bilaterally left greater than right.  Severe degenerative changes of the lower lumbar spine.  Calcifications in the pelvis likely calcified fibroids.    Impression      Moderate degenerative changes of the hips.  Severe degenerative changes of the visualized portion of the lumbar spine.  No acute bony abnormalities.      MRI cervical 3/2017  There is mild anterolisthesis C2 on C3 and mild retrolisthesis of C3 on C4.  There is also mild retrolisthesis of C6 on C7.  The spondylolisthesis is likely  secondary to ligamentous laxity.  There is straightening of normal cervical lordosis. Vertebral body heights are well maintained without evidence of fracture or marrow signal abnormality suspicious for an infiltrative process.  There is loss of intervertebral disc space height at C5-6 and C6-7 Visualized posterior fossa, cervical cord, and upper thoracic cord demonstrate normal signal. Surrounding soft tissue structures demonstrate no significant abnormalities.      C2-3:  Posterior disc osteophyte complex and bilateral facet arthropathy.  Effacement of the anterior thecal sac, without flattening of the spinal cord.  No significant neural foraminal narrowing.     C3-4:  Posterior disc osteophyte complex and bilateral facet arthropathy, resulting in effacement of the anterior thecal sac, without flattening of the spinal cord.  There is moderate left-sided neuroforaminal narrowing.       C4-5:  Posterior disc osteophyte complex and bilateral facet arthropathy, resulting in effacement of the anterior thecal sac, without flattening of the spinal cord.  There is no significant neural foraminal narrowing.     C5-6:  Posterior disc osteophyte complex and bilateral facet arthropathy, resulting in effacement of the anterior thecal sac, without flattening of spinal cord.  No significant neural foraminal narrowing.     C6-7:  Posterior disc osteophyte complex and bilateral facet arthropathy, resulting in effacement of the anterior thecal sac, without flattening of the spinal cord.  No significant neural foraminal narrowing.     C7-T1:  No significant spinal canal stenosis.  No significant neural foraminal narrowing.  Impression         There is multilevel degenerative changes of the cervical spine, resulting in mild spinal canal stenosis and neuroforaminal narrowing as detailed above.      X-ray cervical  AP, neutral lateral, flexion lateral, and extension lateral radiographs of the cervical spine were obtained.  Note that the  cervicothoracic junction is not optimally visualized.  There is 2 mm anterolisthesis of C2 on C3 which increases to 3 mm on the flexion radiographs.  The remainder of the posterior vertebral alignment is satisfactory.  Vertebral body heights are well-maintained.  There are advanced degenerative changes identified throughout the cervical spine with disc space narrowing and anterior and posterior osteophyte formation.  There is solid bony fusion of the C5 and C6 vertebral bodies.  There is mild facet arthropathy noted throughout the cervical spine.  Atherosclerotic calcification is identified in the region of the carotid arteries bilaterally.  Impression         2 mm anterolisthesis of C2 on C3 which appears to be degenerative in etiology.  This increases to 3 mm on the flexion radiograph of the cervical spine.  Cervical spondylosis.  Atherosclerosis.    X-ray left shoulder 2015  Left shoulder: Significant acromiohumeral interval narrowing, this can be associated with a rotator cuff tear.  The humeral head demonstrate normal contour.  No osseous lesions, sclerosis or significant osteophyte formation.  The acromioclavicular joint   appears within normal limits.  The soft tissues appear normal.    Past Medical History:  No date: Anemia  No date: Arthritis  No date: Hashimoto's disease  No date: Hypertension  1/12/2016: IGT (impaired glucose tolerance)  No date: Joint pain  1/12/2016: Multinodular goiter    Past Surgical History:  No date: CATARACT EXTRACTION  9/29/2015: COLONOSCOPY N/A      Comment: Procedure: COLONOSCOPY;  Surgeon: FIDELINA Sanchez MD;  Location: [address];                 Service: Endoscopy;  Laterality: N/A;  No date: JOINT REPLACEMENT      Comment: right knee  12/31/2013: KNEE SURGERY Left      Comment: TKR  No date: left parotidectomy      Comment: mixed tumor  No date: SALIVARY GLAND SURGERY  No date: TONSILLECTOMY    Review of patient's family history indicates:     Hypertension                   Mother                    Prostate cancer                Father                      Comment: prostate cancer    Breast cancer                  Maternal Grandmother      Lupus                          Neg Hx                    Psoriasis                      Neg Hx                    Melanoma                       Neg Hx                    Colon cancer                   Neg Hx                      Social History    Marital status:              Spouse name:                       Years of education:                 Number of children:               Social History Main Topics    Smoking status: Never Smoker                                                                Smokeless tobacco: Never Used                        Alcohol use: No                 Comment: very seldom     Drug use: No              Sexual activity: No                      Comment: ,  age 52,         Current Outpatient Prescriptions:  amlodipine (NORVASC) 5 MG tablet, TAKE 1 TABLET DAILY, Disp: 90 tablet, Rfl: 2  baclofen (LIORESAL) 10 MG tablet, Take 1 tablet (10 mg total) by mouth 3 (three) times daily., Disp: 270 tablet, Rfl: 2  clopidogrel (PLAVIX) 75 mg tablet, Take 1 tablet (75 mg total) by mouth once daily., Disp: 90 tablet, Rfl: 1  deutetrabenazine (AUSTEDO) 9 mg Tab, Take 2 tablets by mouth 2 (two) times daily. Final titrating dose - needs initially titration pack from company, Disp: 120 tablet, Rfl: 11  diazePAM (VALIUM) 2 MG tablet, Take 1 tablet (2 mg total) by mouth every evening., Disp: 30 tablet, Rfl: 3  ferrous sulfate 325 (65 FE) MG EC tablet, Take 325 mg by mouth once daily. , Disp: , Rfl:   gabapentin (NEURONTIN) 300 MG capsule, Take 1-2 capsules (300-600 mg total) by mouth 3 (three) times daily., Disp: 540 capsule, Rfl: 1  hydrocodone-acetaminophen 5-325mg (NORCO) 5-325 mg per tablet, Take 1 tablet by mouth every 24 hours as needed for Pain., Disp: 30 tablet, Rfl:  0  hydroxychloroquine (PLAQUENIL) 200 mg tablet, TAKE 2 TABLETS ONCE DAILY, Disp: 180 tablet, Rfl: 1  linaclotide (LINZESS) 145 mcg Cap capsule, Take 1 capsule (145 mcg total) by mouth once daily., Disp: 30 capsule, Rfl: 0  memantine (NAMENDA XR) 7-14-21-28 mg C24k, Follow titration instructions 7 mg x1. 14 mg x1 week. 21 mg x1 week. 28 mg x1 week., Disp: 1 each, Rfl: 0  memantine 28 mg CSpX, Take 1 capsule (28 mg total) by mouth once daily. START after titration pack completed., Disp: 30 capsule, Rfl: 3  montelukast (SINGULAIR) 10 mg tablet, TAKE 1 TABLET EVERY EVENING, Disp: 90 tablet, Rfl: 2  omega 3-dha-epa-fish oil 250-350-1,000 mg Cap, Take 1 capsule by mouth 2 (two) times daily., Disp: 180 capsule, Rfl: 3  predniSONE (DELTASONE) 5 MG tablet, TAKE 2 TABLETS ONCE DAILY, Disp: 180 tablet, Rfl: 0  sertraline (ZOLOFT) 25 MG tablet, Take 1 tablet (25 mg total) by mouth once daily., Disp: 90 tablet, Rfl: 1  thiamine 100 MG tablet, Take 1 tablet (100 mg total) by mouth once daily., Disp: , Rfl:     No current facility-administered medications for this visit.       Review of patient's allergies indicates:  No Known Allergies        Review of Systems   Constitution: Negative for weight gain and weight loss.   Cardiovascular: Negative for chest pain and dyspnea on exertion.   Respiratory: Negative for shortness of breath.    Musculoskeletal: Positive for back pain, joint pain (bilateral shoulder and left hip ), myalgias and neck pain. Negative for joint swelling.   Gastrointestinal: Negative for abdominal pain, bowel incontinence, nausea and vomiting.   Genitourinary: Negative for bladder incontinence and urgency.   Neurological: Negative for numbness and paresthesias.         Objective:        General: Selma is well-developed, well-nourished, appears stated age, in no acute distress, alert and oriented to time, place and person.     General    Vitals reviewed.  Constitutional: She is oriented to person, place, and  time. She appears well-developed and well-nourished.   HENT:   Head: Normocephalic and atraumatic.   Pulmonary/Chest: Effort normal.   Neurological: She is alert and oriented to person, place, and time.   Psychiatric: She has a normal mood and affect. Her behavior is normal. Judgment and thought content normal.     General Musculoskeletal Exam   Gait: abnormal (with a walker narrow base)     Back (L-Spine & T-Spine) / Neck (C-Spine) Exam     Tenderness Left paramedian tenderness of the Sacrum.     Back (L-Spine & T-Spine) Range of Motion   Extension: 0   Flexion: 60     Spinal Sensation   Right Side Sensation  C-Spine Level: normal   L-Spine Level: normal  S-Spine Level: normal  Left Side Sensation  C-Spine Level: normal  L-Spine Level: normal  S-Spine Level: normal    Back (L-Spine & T-Spine) Tests   Right Side Tests  Straight leg raise:      Sitting SLR: > 70 degrees      Left Side Tests  Straight leg raise:     Sitting SLR: > 70 degrees          Other She has no scoliosis .  Spinal Kyphosis:  Absent  Right Shoulder Exam     Range of Motion   Active abduction: 100     Tests & Signs   Rotator Cuff Painful Arc/Range: moderate    Left Shoulder Exam     Range of Motion   Active abduction: 100     Tests & Signs   Rotator Cuff Painful Arc/Range: moderate      Muscle Strength   Right Upper Extremity   Biceps: 5/5/5   Deltoid:  5/5  Triceps:  5/5  Wrist extension: 5/5/5   : 4/5/5   Finger Flexors:  5/5  Left Upper Extremity  Biceps: 5/5/5   Deltoid:  5/5  Triceps:  5/5  Wrist extension: 5/5/5   :  4/5/5   Finger Flexors:  5/5  Right Lower Extremity   Hip Flexion: 5/5   Quadriceps:  5/5   Anterior tibial:  5/5/5  EHL:  5/5  Left Lower Extremity   Hip Flexion: 5/5   Quadriceps:  5/5   Anterior tibial:  5/5/5   EHL:  5/5    Reflexes     Left Side  Biceps:  2+  Triceps:  2+  Brachioradialis:  2+  Quadriceps:  2+  Achilles:  2+  Left Davis's Sign:  Absent  Babinski Sign:  absent    Right Side   Biceps:  2+  Triceps:   2+  Brachioradialis:  2+  Quadriceps:  2+  Achilles:  2+  Right Davis's Sign:  absent  Babinski Sign:  absent    Vascular Exam     Right Pulses        Carotid:                  2+    Left Pulses        Carotid:                  2+              Assessment:       1. Primary osteoarthritis of both shoulders    2. Primary osteoarthritis of left hip    3. Spondylosis of cervical region without myelopathy or radiculopathy    4. Spondylosis of lumbar region without myelopathy or radiculopathy    5. Isolated cervical dystonia           Plan:       Orders Placed This Encounter    traMADol (ULTRAM) 50 mg tablet     1.  She is having more joint pain today in entire spine and left buttock.  She has severe DJD of the hips and shoulders.  We discussed the arthritis in the back, and could try injections.  She does not want to go back to pain management and does not want to go to IR either  2. continue PT for gait training, core and back strengthening, LE and UE strengthening, shoulder ROM, and hip ROM  3.  Baclofen 10mg po TID, she is taking 5-10 at night.    4.  gabapentin to 300 mg po QHs and she is going to try gabapentin 100-200 BID during the day.  She is taking one 100 during the day.  Details on increasing explained  5.  She is interested in vit D we discussed 100-4000 IU a day.  She also asks about tumeric and B12  6.   IR for left hip joint injection for hip DJD did not help.  She is not interested in trying another injection.  If has another injection she does not want to have it here  7.  Refill of tramadol given today  reviewed.  Risks of medication were explained  8.  RTC 3/5 already scheduled      Follow-up: No follow-ups on file.  If there are any questions prior to this, the patient was instructed to contact the office.

## 2020-01-22 NOTE — PATIENT INSTRUCTIONS
Increase the gabapentin, currently taking 100mg morning and 300 at night    Try to increase to 100 BID for a few days then increase gradually  to 200 BID and 300 at night    Discussed vit D 0332-9769 IU a day  Discussed tumeric as supplement  Discussed B12    Discussed injections in the back.  She is not interested because of pain management here.  Could look for external pain management doctor that might be better experience and go see them

## 2020-01-22 NOTE — PROCEDURES
Procedures   BOTULINUM TOXIN PROCEDURE NOTE FOR CERVICAL DYSTONIA/ SPASMODIC TORTICOLLIS    Diagnosis: CERVICAL DYSTONIA/ SPASMODIC TORTICOLLIS  She has recurrence of pain, spasm and abnormal posturing over the past several weeks. She is experiencing a worsening of cervical dystonia/ spasmodic torticollis which interferes with activities of daily living (ADL).     Meds are working pretty well.  Will inject again today.    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  Given the focal nature of increased tone and poor response to oral medications, She is a good candidate for botulinum injection to her neck.  She will need 200 units of toxin    The goal of the injections will be to reduce pain and abnormal posturing.  The procedure was explained to patient and a consent form was signed.      BOTOX INJECTION PROCEDURE:    Using a 30 gauge injection needle and aseptic technique the following muscles were identified and injected with botox .     Dilution: 100:1      For cervical dystonia/ spasmodic torticollis:      RIGHT    Muscle group Units given # injection sites   Splenius capitus      Levator scapulae     Upper trapezius     Sternocleidomastoid      Scalenus anticus      Scalenus medius     Scalenus posterior     Longissimus capitus      rhomboid     Semispinalis                    LEFT        Muscle group Units given # injection sites   Splenius capitus  25    Levator scapulae     Upper trapezius 25 - 25    Sternocleidomastoid      Scalenus anticus      Scalenus medius     Scalenus posterior     Longissimus capitus      rhomboid     Semispinalis 25            Total amount of botox used:  100 units  Total amount of botox wasted:  0 units    Patient tolerated the procedure without any difficulty and there were no complications.

## 2020-01-27 LAB — FINAL PATHOLOGIC DIAGNOSIS: NORMAL

## 2020-01-28 ENCOUNTER — PATIENT MESSAGE (OUTPATIENT)
Dept: ENDOCRINOLOGY | Facility: CLINIC | Age: 85
End: 2020-01-28

## 2020-01-28 DIAGNOSIS — E04.2 MULTINODULAR GOITER: Primary | ICD-10-CM

## 2020-01-28 LAB — FINAL PATHOLOGIC DIAGNOSIS: NORMAL

## 2020-01-31 ENCOUNTER — EXTERNAL CHRONIC CARE MANAGEMENT (OUTPATIENT)
Dept: PRIMARY CARE CLINIC | Facility: CLINIC | Age: 85
End: 2020-01-31
Payer: MEDICARE

## 2020-01-31 PROCEDURE — 99490 PR CHRONIC CARE MGMT, 1ST 20 MIN: ICD-10-PCS | Mod: S$PBB,,, | Performed by: FAMILY MEDICINE

## 2020-01-31 PROCEDURE — 99490 CHRNC CARE MGMT STAFF 1ST 20: CPT | Mod: S$PBB,,, | Performed by: FAMILY MEDICINE

## 2020-01-31 PROCEDURE — 99490 CHRNC CARE MGMT STAFF 1ST 20: CPT | Mod: PBBFAC | Performed by: FAMILY MEDICINE

## 2020-02-03 ENCOUNTER — PATIENT MESSAGE (OUTPATIENT)
Dept: SPINE | Facility: CLINIC | Age: 85
End: 2020-02-03

## 2020-02-03 ENCOUNTER — PATIENT MESSAGE (OUTPATIENT)
Dept: ENDOCRINOLOGY | Facility: CLINIC | Age: 85
End: 2020-02-03

## 2020-02-04 ENCOUNTER — PATIENT OUTREACH (OUTPATIENT)
Dept: ADMINISTRATIVE | Facility: OTHER | Age: 85
End: 2020-02-04

## 2020-02-04 RX ORDER — GABAPENTIN 100 MG/1
300 CAPSULE ORAL 2 TIMES DAILY
Qty: 360 CAPSULE | Refills: 2 | Status: ON HOLD | OUTPATIENT
Start: 2020-02-04 | End: 2020-03-10 | Stop reason: HOSPADM

## 2020-02-04 RX ORDER — GABAPENTIN 300 MG/1
300 CAPSULE ORAL NIGHTLY
Qty: 90 CAPSULE | Refills: 2 | Status: ON HOLD | OUTPATIENT
Start: 2020-02-04 | End: 2020-03-10 | Stop reason: HOSPADM

## 2020-02-05 ENCOUNTER — OFFICE VISIT (OUTPATIENT)
Dept: CARDIOLOGY | Facility: CLINIC | Age: 85
End: 2020-02-05
Payer: MEDICARE

## 2020-02-05 VITALS
SYSTOLIC BLOOD PRESSURE: 148 MMHG | WEIGHT: 184.94 LBS | BODY MASS INDEX: 31.57 KG/M2 | HEIGHT: 64 IN | HEART RATE: 97 BPM | DIASTOLIC BLOOD PRESSURE: 70 MMHG

## 2020-02-05 DIAGNOSIS — I48.0 AF (PAROXYSMAL ATRIAL FIBRILLATION): ICD-10-CM

## 2020-02-05 DIAGNOSIS — I10 HYPERTENSION, ESSENTIAL: ICD-10-CM

## 2020-02-05 DIAGNOSIS — I87.2 VENOUS INSUFFICIENCY: ICD-10-CM

## 2020-02-05 DIAGNOSIS — I70.0 AORTIC ATHEROSCLEROSIS: ICD-10-CM

## 2020-02-05 DIAGNOSIS — R60.9 EDEMA, UNSPECIFIED TYPE: Primary | ICD-10-CM

## 2020-02-05 DIAGNOSIS — I67.9 SMALL VESSEL DISEASE, CEREBROVASCULAR: ICD-10-CM

## 2020-02-05 PROCEDURE — 99214 PR OFFICE/OUTPT VISIT, EST, LEVL IV, 30-39 MIN: ICD-10-PCS | Mod: S$PBB,GC,, | Performed by: STUDENT IN AN ORGANIZED HEALTH CARE EDUCATION/TRAINING PROGRAM

## 2020-02-05 PROCEDURE — 99999 PR PBB SHADOW E&M-EST. PATIENT-LVL IV: ICD-10-PCS | Mod: PBBFAC,GC,, | Performed by: STUDENT IN AN ORGANIZED HEALTH CARE EDUCATION/TRAINING PROGRAM

## 2020-02-05 PROCEDURE — 99214 OFFICE O/P EST MOD 30 MIN: CPT | Mod: S$PBB,GC,, | Performed by: STUDENT IN AN ORGANIZED HEALTH CARE EDUCATION/TRAINING PROGRAM

## 2020-02-05 PROCEDURE — 99214 OFFICE O/P EST MOD 30 MIN: CPT | Mod: PBBFAC | Performed by: STUDENT IN AN ORGANIZED HEALTH CARE EDUCATION/TRAINING PROGRAM

## 2020-02-05 PROCEDURE — 99999 PR PBB SHADOW E&M-EST. PATIENT-LVL IV: CPT | Mod: PBBFAC,GC,, | Performed by: STUDENT IN AN ORGANIZED HEALTH CARE EDUCATION/TRAINING PROGRAM

## 2020-02-05 NOTE — PROGRESS NOTES
Ms. Lux is a patient of Dr. Wolf and was last seen in [facility] 11/20/2018.    Subjective:   Patient ID:  Selma Rosado MD Jose J is a 84 y.o. female who presents for follow-up of cardiac problems.    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 Last seen in November for intermittent lower ext edema on lasix 20. No other CHF signs/symptoms. Echo with EF reported as 40%; reviewed with echo staff, EF appeared stable, low normal 50-55%. Trialed increased lasix 40 qd; BMP stable. Felt SANAZ improved slightly for about one week then no change.    Returns for follow up today. Ongoing SANAZ, stable. Unable to tolerate knee high compression stockings previously. Denies PND, orthopnea, CP, palpitiatons, presyncope/syncope. Chronic dyspnea slightly worse; predominantly decreased energy, fatigue and generalized weakness. Feels like it requires more effort for routine activities. Have adjusted PT plans to see if can help with her strength. Also recently adjusted on her RA meds, limited by hip pain.     Also today here to discuss current cardiac meds and if we can decrease her number of medications. Carvedilol off for last week, wanted to see if she could stop. Started at time of paroxysmal AF; no other BB indication. Also on ASA + plavix for years; unclear why plavix initially started - ?at time of MRI with chronic microvascular change and remote punctate stroke. No prior stenting. No clinical prior CVA.      Review of Systems   Constitution: Negative for decreased  appetite, diaphoresis, malaise/fatigue, weight gain and weight loss.   Eyes: Negative for visual disturbance.   Cardiovascular: Negative for chest pain, claudication, dyspnea on exertion, irregular heartbeat, leg swelling, near-syncope, orthopnea, palpitations, paroxysmal nocturnal dyspnea and syncope.        Denies chest pressure   Respiratory: Negative for cough, hemoptysis, shortness of breath, sleep disturbances due to breathing and snoring.    Endocrine: Negative for cold intolerance and heat intolerance.   Hematologic/Lymphatic: Negative for bleeding problem. Does not bruise/bleed easily.   Musculoskeletal: Negative for myalgias.   Gastrointestinal: Negative for bloating, abdominal pain, anorexia, change in bowel habit, constipation, diarrhea, nausea and vomiting.   Neurological: Negative for difficulty with concentration, disturbances in coordination, excessive daytime sleepiness, dizziness, headaches, light-headedness, loss of balance, numbness and weakness.   Psychiatric/Behavioral: The patient does not have insomnia.      Allergies and current medications updated and reviewed:  Review of patient's allergies indicates:  No Known Allergies  Current Outpatient Medications   Medication Sig    aspirin (ECOTRIN) 81 MG EC tablet Take 81 mg by mouth once daily.    baclofen (LIORESAL) 10 MG tablet Take 1 tablet (10 mg total) by mouth 3 (three) times daily.    carvedilol (COREG) 6.25 MG tablet Take 1 tablet (6.25 mg total) by mouth 2 (two) times daily with meals.    clopidogrel (PLAVIX) 75 mg tablet 1 tablet (75 mg total) by Per G Tube route once daily. (Patient taking differently: Take 75 mg by mouth once daily. )    furosemide (LASIX) 20 MG tablet Take 1 tablet (20 mg total) by mouth 2 (two) times daily.    gabapentin (NEURONTIN) 300 MG capsule Take 1 capsule (300 mg total) by mouth 3 (three) times daily.    hydroxychloroquine (PLAQUENIL) 200 mg tablet 2 tablets (400 mg total) by Per G Tube route once  "daily. (Patient taking differently: Take 400 mg by mouth once daily. )    magnesium oxide (MAG-OX) 400 mg tablet Take 400 mg by mouth once daily.    montelukast (SINGULAIR) 10 mg tablet Take 1 tablet (10 mg total) by mouth every evening.    omeprazole (PRILOSEC) 20 MG capsule Take 20 mg by mouth once daily.     pantoprazole (PROTONIX) 40 mg GrPS 1 packet (40 mg total) by Per G Tube route once daily. (Patient taking differently: Take 20 mg by mouth once daily. )    predniSONE (DELTASONE) 5 MG tablet Take 1 tablet (5 mg total) by mouth once daily. Take 4 in the am and 3 in the evening for 2 weeks - then  Take 3 in the am and 3 in the evening for 2 weeks - then  Take 3 in the am and 2 in the evening for 2 weeks - consult with rheumatology for further advice    thiamine 100 MG tablet Take 100 mg by mouth once daily.     No current facility-administered medications for this visit.        Objective:     Right Arm BP - Sittin/69 (20)  Left Arm BP - Sittin/70 (20)    BP (!) 148/70 (BP Location: Left arm, Patient Position: Sitting, BP Method: Large (Automatic))   Pulse 97   Ht 5' 4" (1.626 m)   Wt 83.9 kg (184 lb 15.5 oz)   LMP  (LMP Unknown)   BMI 31.75 kg/m²       Physical Exam   Constitutional: She is oriented to person, place, and time. Vital signs are normal. She appears well-developed and well-nourished. She is active. No distress.   HENT:   Head: Normocephalic and atraumatic.   Eyes: Conjunctivae and lids are normal. No scleral icterus.   Neck: Neck supple. Normal carotid pulses, no hepatojugular reflux and no JVD present. Carotid bruit is not present.   Cardiovascular: Normal rate, S1 normal, S2 normal and intact distal pulses. An irregularly irregular rhythm present. Exam reveals no gallop and no friction rub.   No murmur heard.  Pulmonary/Chest: Effort normal and breath sounds normal. No respiratory distress. She has no decreased breath sounds. She has no wheezes. She " has no rhonchi. She has no rales. She exhibits no tenderness.   Abdominal: Soft. Normal appearance and bowel sounds are normal. She exhibits no distension, no fluid wave, no abdominal bruit, no ascites and no pulsatile midline mass. There is no hepatosplenomegaly. There is no tenderness.   Musculoskeletal: She exhibits edema (2-3+ R, 2+ L ).   Neurological: She is alert and oriented to person, place, and time. Gait normal.   Skin: Skin is warm, dry and intact. No rash noted. She is not diaphoretic. Nails show no clubbing.   Psychiatric: She has a normal mood and affect. Her speech is normal and behavior is normal. Judgment and thought content normal. Cognition and memory are normal.   Nursing note and vitals reviewed.      Chemistry        Component Value Date/Time     12/09/2019 1346     12/09/2019 1346    K 4.3 12/09/2019 1346    K 4.3 12/09/2019 1346     12/09/2019 1346     12/09/2019 1346    CO2 31 (H) 12/09/2019 1346    CO2 31 (H) 12/09/2019 1346    BUN 21 12/09/2019 1346    BUN 21 12/09/2019 1346    CREATININE 1.2 12/09/2019 1346    CREATININE 1.2 12/09/2019 1346     12/09/2019 1346     12/09/2019 1346        Component Value Date/Time    CALCIUM 8.7 12/09/2019 1346    CALCIUM 8.7 12/09/2019 1346    ALKPHOS 60 12/09/2019 1346    AST 16 12/09/2019 1346    ALT 14 12/09/2019 1346    BILITOT 0.6 12/09/2019 1346    ESTGFRAFRICA 48.6 (A) 12/09/2019 1346    ESTGFRAFRICA 48.6 (A) 12/09/2019 1346    EGFRNONAA 42.2 (A) 12/09/2019 1346    EGFRNONAA 42.2 (A) 12/09/2019 1346        Lab Results   Component Value Date    HGBA1C 5.6 08/27/2019     Recent Labs   Lab 03/13/19  1312  04/14/19  1246 08/26/19  2357  12/09/19  1346   WBC 14.37 H   < > 11.04  --    < > 9.80   Hemoglobin 12.1   < > 12.9  --    < > 12.6   POC Hematocrit  --   --   --   --    < >  --    Hematocrit 38.4   < > 42.2  --    < > 43.4   Mean Corpuscular Volume 86   < > 87  --    < > 93   Platelets 269   < > 150  --    < >  144 L   BNP 97  --  142 H 70  --   --    TSH 1.830  --   --  0.895  --   --    Cholesterol  --   --   --  197  --   --    HDL  --   --   --  90 H  --   --    LDL Cholesterol  --   --   --  91.6  --   --    Triglycerides  --   --   --  77  --   --    Hdl/Cholesterol Ratio  --   --   --  45.7  --   --     < > = values in this interval not displayed.       Recent Labs   Lab 04/25/18  0832 03/13/19  1312 04/14/19  1246 08/28/19  0608   INR 1.2 1.2 1.0 1.0        Test(s) Reviewed  I have reviewed the following in detail:  [] Stress test   [] Angiography   [x] Echocardiogram   [x] Labs   [] Other:         Assessment/Plan:     1. Edema, unspecified type     2. Hypertension, essential     3. AF (paroxysmal atrial fibrillation)     4. Aortic atherosclerosis     5. Small vessel disease, cerebrovascular     6. Venous insufficiency       Doing well from cardiac perspective. Mild SANAZ, unchanged, suspect mostly venous insufficiency. No other signs/sx HFpEF, appears euvolemic otherwise.   Will trial thigh high stockings, starting at lowest compression. Continue lasix 40. On longstanding amlodipine, no issues with edema when initially started on it. Could consider switch to alternative agent, storm if BP remains adequately controlled.   In terms of polypharmacy, will discontinue coreg as could be contributing to weakness/fatigue and no compelling indication. Likewise, no indication to continue DAPT, will stop plavix. Given known cerebrovascular disease, nonobstructive carotid plaque, aortic atheroscleorsis on imaging, reasonable to continue ASA and statin for primary prevetnion.     Edema  -Mostly insufficiency, compression stockings as above, continue lasix 40 qd  -Low Na diet    HTN:   -Above goal off meds, resume home meds  -Discontinue coreg as above  -Continue amlodipine, lasix  -Monitor BP at home, if above goal <130/80, can add additional medication (next line ARB, avoid BB)    pAF  -Single episode precipitated by acute  hypoxic resp failure in 2018  -Negative event monitor  -Previously discussed AC (Chadsvasc 4) and given lack of recurrence, monitoring and adding only if clinical AF recurs    The patient was discussed and examined by Dr. Garcia    Follow up in about 1 month (around 3/5/2020).

## 2020-02-11 NOTE — TELEPHONE ENCOUNTER
Home Health Agency/Nurse:[facility]  Is requesting Please extend Dr Lux one more week and then transition patient to outpatient is responding well but needs a little more time have a statement sent to [facility] or call for a verbal  . Please . Thank you    LM for patient to schedule appointment

## 2020-02-12 ENCOUNTER — PATIENT MESSAGE (OUTPATIENT)
Dept: SPINE | Facility: CLINIC | Age: 85
End: 2020-02-12

## 2020-02-17 ENCOUNTER — OFFICE VISIT (OUTPATIENT)
Dept: INTERNAL MEDICINE | Facility: CLINIC | Age: 85
End: 2020-02-17
Attending: FAMILY MEDICINE
Payer: MEDICARE

## 2020-02-17 ENCOUNTER — PATIENT MESSAGE (OUTPATIENT)
Dept: SPINE | Facility: CLINIC | Age: 85
End: 2020-02-17

## 2020-02-17 VITALS
BODY MASS INDEX: 31.84 KG/M2 | SYSTOLIC BLOOD PRESSURE: 112 MMHG | HEIGHT: 64 IN | TEMPERATURE: 98 F | WEIGHT: 186.5 LBS | OXYGEN SATURATION: 95 % | DIASTOLIC BLOOD PRESSURE: 60 MMHG | HEART RATE: 101 BPM

## 2020-02-17 DIAGNOSIS — E78.5 HYPERLIPIDEMIA, UNSPECIFIED HYPERLIPIDEMIA TYPE: ICD-10-CM

## 2020-02-17 DIAGNOSIS — F01.50 VASCULAR DEMENTIA WITHOUT BEHAVIORAL DISTURBANCE: ICD-10-CM

## 2020-02-17 DIAGNOSIS — M47.816 LUMBAR SPONDYLOSIS: ICD-10-CM

## 2020-02-17 DIAGNOSIS — M16.4 POST-TRAUMATIC OSTEOARTHRITIS OF BOTH HIPS: Primary | ICD-10-CM

## 2020-02-17 DIAGNOSIS — M16.12 PRIMARY OSTEOARTHRITIS OF LEFT HIP: ICD-10-CM

## 2020-02-17 DIAGNOSIS — M48.00 SPINAL STENOSIS, UNSPECIFIED SPINAL REGION: ICD-10-CM

## 2020-02-17 DIAGNOSIS — Z86.73 HISTORY OF STROKE: ICD-10-CM

## 2020-02-17 DIAGNOSIS — J84.116 CRYPTOGENIC ORGANIZING PNEUMONIA: ICD-10-CM

## 2020-02-17 DIAGNOSIS — I10 HYPERTENSION, ESSENTIAL: ICD-10-CM

## 2020-02-17 DIAGNOSIS — Z86.73 HISTORY OF TRANSIENT ISCHEMIC ATTACK (TIA): ICD-10-CM

## 2020-02-17 DIAGNOSIS — M05.79 SEROPOSITIVE RHEUMATOID ARTHRITIS OF MULTIPLE SITES: ICD-10-CM

## 2020-02-17 DIAGNOSIS — R53.1 GENERALIZED WEAKNESS: ICD-10-CM

## 2020-02-17 DIAGNOSIS — E04.2 MULTINODULAR GOITER: ICD-10-CM

## 2020-02-17 PROCEDURE — 99214 PR OFFICE/OUTPT VISIT, EST, LEVL IV, 30-39 MIN: ICD-10-PCS | Mod: S$PBB,,, | Performed by: FAMILY MEDICINE

## 2020-02-17 PROCEDURE — 99999 PR PBB SHADOW E&M-EST. PATIENT-LVL IV: ICD-10-PCS | Mod: PBBFAC,,, | Performed by: FAMILY MEDICINE

## 2020-02-17 PROCEDURE — 99214 OFFICE O/P EST MOD 30 MIN: CPT | Mod: S$PBB,,, | Performed by: FAMILY MEDICINE

## 2020-02-17 PROCEDURE — 99214 OFFICE O/P EST MOD 30 MIN: CPT | Mod: PBBFAC | Performed by: FAMILY MEDICINE

## 2020-02-17 PROCEDURE — 99999 PR PBB SHADOW E&M-EST. PATIENT-LVL IV: CPT | Mod: PBBFAC,,, | Performed by: FAMILY MEDICINE

## 2020-02-17 RX ORDER — ATORVASTATIN CALCIUM 40 MG/1
40 TABLET, FILM COATED ORAL DAILY
Qty: 7 TABLET | Refills: 2 | Status: SHIPPED | OUTPATIENT
Start: 2020-02-17 | End: 2020-02-17 | Stop reason: SDUPTHER

## 2020-02-17 RX ORDER — ATORVASTATIN CALCIUM 40 MG/1
40 TABLET, FILM COATED ORAL DAILY
Qty: 90 TABLET | Refills: 3 | Status: SHIPPED | OUTPATIENT
Start: 2020-02-17 | End: 2021-05-11 | Stop reason: SDUPTHER

## 2020-02-17 NOTE — PROGRESS NOTES
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    Chief Complaint: Establish Care (rx refills)    Established patient follows up for management of chronic medical illnesses with complaints today. Please see dictation and ROS for interval problems, specific complaints and disease management discussion.    P, S, Fm, Soc Hx's; Meds, allergies reviewed and reconciled.  Health maintenance file reviewed and addressed items due.    I reviewed her rheumatologist and Pulmonary notes.  There is a balance between COPD and her rheumatoid medications.  She is currently on a dose of 5 mg prednisone which seems to be keeping COPD at [place].  Additionally, CellCept for her RA.  Also hydroxychloroquine.  Her recent CBC and BMP are within normal limits.    She complains of left greater than hip pain. This seems circumferential or even posterior in location.  Progressive weakness and difficulty standing.  Has done extensive physical therapy with little gain.  Review of her hip x-rays and back x-rays with MRI demonstrates significant degenerative changes in both with significant spinal stenosis.  They would like to entertain a possible orthopedic consult concerning hip arthritis however I think her issue is multifactorial, would be difficult to treat.  And would be risking significant morbidity.  They are concerned about her quality of life and pain level.  Also concerned about progressive in activity.    Recently saw cardiologist.  They stopped her Plavix and carvedilol.  Plavix was initiated when she had a stroke a few years ago.  They would like to keep her on aspirin and statin.  Carvedilol had been started for possible rate control with PAF in the past.  She has had no recurrences.  Lipitor was started in the hospital may would like to continue that.  Possible interaction with Humira noted in the past.    Her overall condition is very difficult to manage, and she is in a delicate balance at this time.   Also seeing Dr. Thomson for her back and joint problems. Recently had hip injection with little benefit.    Review of Systems   Constitutional: Positive for fatigue. Negative for chills, fever and unexpected weight change.   HENT: Negative for congestion and trouble swallowing.    Eyes: Negative for redness and visual disturbance.   Respiratory: Negative for cough, chest tightness and shortness of breath.    Cardiovascular: Positive for leg swelling. Negative for chest pain and palpitations.   Gastrointestinal: Negative for abdominal pain and blood in stool.   Genitourinary: Negative for difficulty urinating, hematuria and menstrual problem.   Musculoskeletal: Positive for arthralgias, back pain, gait problem, myalgias, neck pain and neck stiffness. Negative for joint swelling.   Skin: Negative for color change and rash.   Neurological: Negative for tremors, speech difficulty, weakness, numbness and headaches.   Hematological: Negative for adenopathy. Does not bruise/bleed easily.   Psychiatric/Behavioral: Positive for decreased concentration and dysphoric mood. Negative for behavioral problems, confusion and sleep disturbance. The patient is not nervous/anxious.        Objective:      Physical Exam   Constitutional: She is oriented to person, place, and time. She appears well-developed and well-nourished.   HENT:   Head: Normocephalic and atraumatic.   Eyes: Conjunctivae are normal. No scleral icterus.   Neck: Normal range of motion. Neck supple.   Cardiovascular: Normal rate, normal heart sounds and intact distal pulses. Exam reveals no gallop and no friction rub.   No murmur heard.  Pulmonary/Chest: Effort normal and breath sounds normal. No respiratory distress. She has no wheezes. She has no rales.   Abdominal: She exhibits no abdominal bruit.   Musculoskeletal: She exhibits no edema or deformity.   Neurological: She is alert and oriented to person, place, and time. She displays no tremor. No cranial nerve  deficit. Coordination and gait abnormal. GCS eye subscore is 4. GCS verbal subscore is 5. GCS motor subscore is 6.   She was able to stand from her wheelchair today but could only take a few steps gingerly.   Skin: Skin is warm and dry. No rash noted. She is not diaphoretic. No erythema.   Psychiatric: She has a normal mood and affect. Her behavior is normal. Judgment and thought content normal.   Nursing note and vitals reviewed.      Assessment:       1. Post-traumatic osteoarthritis of both hips    2. Seropositive rheumatoid arthritis of multiple sites    3. Generalized weakness    4. Vascular dementia without behavioral disturbance    5. History of stroke    6. Cryptogenic organizing pneumonia    7. Multinodular goiter    8. History of transient ischemic attack (TIA)    9. Hypertension, essential    10. Hyperlipidemia, unspecified hyperlipidemia type    11. Primary osteoarthritis of left hip    12. Spinal stenosis, unspecified spinal region    13. Lumbar spondylosis        Plan:     Medication List with Changes/Refills   Current Medications    ACETAMINOPHEN (TYLENOL) 325 MG TABLET    Take 2 tablets (650 mg total) by mouth every 6 (six) hours as needed.    ALBUTEROL (PROVENTIL/VENTOLIN HFA) 90 MCG/ACTUATION INHALER    Inhale 2 puffs into the lungs every 6 (six) hours as needed for Wheezing. Rescue    AMLODIPINE (NORVASC) 10 MG TABLET    Take 1 tablet (10 mg total) by mouth once daily.    ASPIRIN (ECOTRIN) 81 MG EC TABLET    Take 81 mg by mouth once daily.    BACLOFEN (LIORESAL) 10 MG TABLET    Take 10 mg by mouth 3 (three) times daily as needed.    DIAZEPAM (VALIUM) 5 MG TABLET    Take 1 tablet (5 mg total) by mouth daily as needed for Anxiety.    FUROSEMIDE (LASIX) 40 MG TABLET    TAKE 1 TABLET(40 MG) BY MOUTH TWICE DAILY    GABAPENTIN (NEURONTIN) 100 MG CAPSULE    Take 3 capsules (300 mg total) by mouth 2 (two) times daily.    GABAPENTIN (NEURONTIN) 300 MG CAPSULE    Take 1 capsule (300 mg total) by mouth every  evening.    HYDROXYCHLOROQUINE (PLAQUENIL) 200 MG TABLET    Take 1 tablet (200 mg total) by mouth 2 (two) times daily.    MAGNESIUM OXIDE (MAG-OX) 400 MG (241.3 MG MAGNESIUM) TABLET    Take 400 mg by mouth once daily.    MONTELUKAST (SINGULAIR) 10 MG TABLET    Take 1 tablet (10 mg total) by mouth every evening.    MYCOPHENOLATE (CELLCEPT) 500 MG TAB    Take 1 tablet (500 mg total) by mouth 2 (two) times daily.    OMEPRAZOLE (PRILOSEC) 20 MG CAPSULE    Take 1 capsule (20 mg total) by mouth once daily.    POTASSIUM CHLORIDE SA (K-DUR,KLOR-CON) 10 MEQ TABLET    Take 2 tablets (20 mEq total) by mouth once daily.    PREDNISONE (DELTASONE) 5 MG TABLET    Take 2 tablets (10 mg total) by mouth once daily.    THIAMINE 100 MG TABLET    Take 100 mg by mouth once daily.    TRAMADOL (ULTRAM) 50 MG TABLET    Take 1 tablet (50 mg total) by mouth every 6 (six) hours as needed for Pain.   Changed and/or Refilled Medications    Modified Medication Previous Medication    ATORVASTATIN (LIPITOR) 40 MG TABLET atorvastatin (LIPITOR) 40 MG tablet       Take 1 tablet (40 mg total) by mouth once daily.    Take 1 tablet (40 mg total) by mouth once daily.     Selma was seen today for establish care.    Diagnoses and all orders for this visit:    Post-traumatic osteoarthritis of both hips  -     Ambulatory referral/consult to Orthopedics; Future    Seropositive rheumatoid arthritis of multiple sites    Generalized weakness    Vascular dementia without behavioral disturbance    History of stroke    Cryptogenic organizing pneumonia    Multinodular goiter    History of transient ischemic attack (TIA)    Hypertension, essential    Hyperlipidemia, unspecified hyperlipidemia type    Primary osteoarthritis of left hip    Spinal stenosis, unspecified spinal region    Lumbar spondylosis    Other orders  -     Discontinue: atorvastatin (LIPITOR) 40 MG tablet; Take 1 tablet (40 mg total) by mouth once daily.  -     atorvastatin (LIPITOR) 40 MG tablet;  Take 1 tablet (40 mg total) by mouth once daily.      See meds, orders, follow up, routing and instructions sections of encounter and AVS. Discussed with patient and provided on AVS.    Medications reconciled.  Chart reviewed.  Long discussion with patient and daughter today.  Follow-up in 2 months.    Thyroid nodules recently biopsied.

## 2020-02-19 ENCOUNTER — PATIENT OUTREACH (OUTPATIENT)
Dept: ADMINISTRATIVE | Facility: OTHER | Age: 85
End: 2020-02-19

## 2020-02-21 ENCOUNTER — OFFICE VISIT (OUTPATIENT)
Dept: ORTHOPEDICS | Facility: CLINIC | Age: 85
End: 2020-02-21
Attending: FAMILY MEDICINE
Payer: MEDICARE

## 2020-02-21 ENCOUNTER — HOSPITAL ENCOUNTER (OUTPATIENT)
Dept: RADIOLOGY | Facility: HOSPITAL | Age: 85
Discharge: HOME | End: 2020-02-21
Attending: ORTHOPAEDIC SURGERY
Payer: MEDICARE

## 2020-02-21 VITALS — BODY MASS INDEX: 34.52 KG/M2 | HEIGHT: 64 IN | WEIGHT: 202.19 LBS

## 2020-02-21 DIAGNOSIS — M25.551 BILATERAL HIP PAIN: Primary | ICD-10-CM

## 2020-02-21 DIAGNOSIS — M25.552 BILATERAL HIP PAIN: ICD-10-CM

## 2020-02-21 DIAGNOSIS — M25.552 BILATERAL HIP PAIN: Primary | ICD-10-CM

## 2020-02-21 DIAGNOSIS — M16.4 POST-TRAUMATIC OSTEOARTHRITIS OF BOTH HIPS: ICD-10-CM

## 2020-02-21 DIAGNOSIS — M16.0 PRIMARY OSTEOARTHRITIS OF BOTH HIPS: Primary | ICD-10-CM

## 2020-02-21 DIAGNOSIS — M25.551 BILATERAL HIP PAIN: ICD-10-CM

## 2020-02-21 PROCEDURE — 73521 X-RAY EXAM HIPS BI 2 VIEWS: CPT | Mod: 26,,, | Performed by: RADIOLOGY

## 2020-02-21 PROCEDURE — 99214 PR OFFICE/OUTPT VISIT, EST, LEVL IV, 30-39 MIN: ICD-10-PCS | Mod: S$PBB,,, | Performed by: ORTHOPAEDIC SURGERY

## 2020-02-21 PROCEDURE — 99999 PR PBB SHADOW E&M-EST. PATIENT-LVL IV: CPT | Mod: PBBFAC,,, | Performed by: ORTHOPAEDIC SURGERY

## 2020-02-21 PROCEDURE — 99214 OFFICE O/P EST MOD 30 MIN: CPT | Mod: PBBFAC,25 | Performed by: ORTHOPAEDIC SURGERY

## 2020-02-21 PROCEDURE — 73521 XR HIPS BILATERAL 2 VIEW INCL AP PELVIS: ICD-10-PCS | Mod: 26,,, | Performed by: RADIOLOGY

## 2020-02-21 PROCEDURE — 99214 OFFICE O/P EST MOD 30 MIN: CPT | Mod: S$PBB,,, | Performed by: ORTHOPAEDIC SURGERY

## 2020-02-21 PROCEDURE — 73521 X-RAY EXAM HIPS BI 2 VIEWS: CPT | Mod: TC

## 2020-02-21 PROCEDURE — 99999 PR PBB SHADOW E&M-EST. PATIENT-LVL IV: ICD-10-PCS | Mod: PBBFAC,,, | Performed by: ORTHOPAEDIC SURGERY

## 2020-02-21 NOTE — PROGRESS NOTES
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     She was referred by Dr. Lee for evaluation of bilateral hip pain "She complains of left greater than hip pain. This seems circumferential or even posterior in location.  Progressive weakness and difficulty standing.  Has done extensive physical therapy with little gain.  Review of her hip x-rays and back x-rays with MRI demonstrates significant degenerative changes in both with significant spinal stenosis.  They would like to entertain a possible orthopedic consult concerning hip arthritis however I think her issue is multifactorial, would be difficult to treat.  And would be risking significant morbidity.  They are concerned about her quality of life and pain level.  Also concerned about progressive in activity."    She has an extensive medical history and has gotten progressively debilitated over the past few years.  She has had 3 ICU admissions for and been on a ventilator for respiratory failure since April of 2018. She has gotten significantly more debilitated after the last to ICU admissions.  It seems that these may have been related to adjusting her steroids my sense is she may have been steroid dependent but now she is on CellCept and they are able to wean down her steroids and pulmonary and Rheumatology are coordinating her medications    She complains of global weakness and pain. She has admits that she does not have any regular placed that she hurts but in the morning she does have some left anterior hip pain. She has low back pain she has buttock pain she has anterior hip pain she has anterior thigh pain but no significant radicular pain or paresthesias.    She takes Tylenol and tramadol for pain. She had a left hip image guided steroid injection November 2019 which she said did not help at " any point if anything made things worse.  She is going to outpatient therapy.  She has a walker and a make shift wheelchair she has a very limited household ambulator.  She has difficulty walking and difficulty with activities of daily living at this point getting very debilitated and has difficulty even getting out of bed    Her daughter is here with her today    ROS:  The updated medical history is in the chart and has been reviewed. A review of systems is updated and there is no reported vision changes, ear/nose/mouth/throat complaints,  chest pain, shortness of breath, abdominal pain, urological complaints, fevers or chills, psychiatric complaints. Musculoskeletal and neurologcial symptoms are as documented. All other systems are negative.      Objective:   Exam:  There were no vitals filed for this visit.  Body mass index is 34.7 kg/m².    Physical examination included assessment of the patient's general appearance with particular attention to development, nutrition, body habitus, attention to grooming, and any evidence of distress.  Constitutional: The patient is a well-developed, well-nourished patient in no acute distress.   Cardiovascular: Vascular examination included warmth and capillary refill as well inspection for edema and assessment of pedal pulses. Pulses are palpable and regular.  Musculoskeletal: Gait was assessed as to whether it was steady, non-antalgic, and/or required the use of an assist device. The patient was also asked to walk independently and get onto the examination table.  Skin: The skin was examined for any obvious rashes or lesions in the extremity.  Neurologic: Sensation is intact to light touch in the extremity. The patient has good coordination without hyperreflexia and is alert and oriented to person, place and time and has normal mood and affect.     All of the above were examined and found to be within normal limits except for the following pertinent clinical findings:    She  can stand with some prior singly minimal assistance but she can only take 1 or 2 steps max in the room with significant assistance.  She is in a unable to get on the examining table.  Seated in a wheelchair she has no pain with active or passive hip flexion.  Both hips have about 110 degree of flexion 20 abduction 20 abduction 40 external 30 internal rotation which causes no apparent distress or pain. She has 5-out of 5 hip flexion 4-5 knee flexion knee extension 5/5 tib ant and EHL and gastroc, she has bilateral total knee incisions are knees are moving well good range of motion and stability no pain no effusions      Imaging:  Indication:  Left hip pain  Exam Ordered: Radiographs include an anteroposterior pelvis, an anteroposterior and lateral view of the proximal femur including the hip joint.  Details of Examination: Today's exam shows evidence of joint space narrowing, osteophyte formation, and subchondral sclerosis, all consistent with degenerative arthritis of the hip.  No other significant findings are noted.  Impression:  Degenerative Arthritis, Left Hip    Indication:  Right hip pain  Exam Ordered: Radiographs include an anteroposterior pelvis, an anteroposterior and lateral view of the proximal femur including the hip joint.  Details of Examination: Today's exam shows evidence of joint space narrowing, osteophyte formation, and subchondral sclerosis, all consistent with degenerative arthritis of the hip.  No other significant findings are noted.  Impression:  Degenerative Arthritis, Right Hip    She has preserved superior joint space she does have narrowed central space consistent with a more central pattern of arthritis which is seen with rheumatoid arthritis however no significant osteophyte formation    She does have extensive and very severe lumbar spine arthritic changes      Assessment:     Primary osteoarthritis of both hips [M16.0]  I think that most of her symptoms are likely coming from her  spine she likely has severe spinal stenosis  Extremely debilitated with multiple medical comorbidities very high risk surgical patient  Multiple ICU admissions for visit respiratory failure and mechanical ventilation since 2018    BMI 35  Bilateral total knee replacements left side done by Dr. Chang at [facility] in 2013, right side reportedly done by Dr. [facility], recent ESR and CRP are normal  History of stroke has been taken off Plavix just on aspirin  Spinal stenosis  Pulmonary hypertension  Atrial fibrillation  Chronic kidney disease  Rheumatoid arthritis on prednisone 5 milligrams and CellCept     Plan:       I think she has some mild to moderate central arthritis in her hips but her hips seem minimally symptomatic on exam today I certainly do not think they are main pain generator or that she needs to undergo the risks of a hip replacement at this time.  Fact that she did not get any relief from the steroid injection in November I think also confirms the above    I think that the majority of her symptoms are coming from her overall global debilitation and spinal stenosis  She saw Dr. Landa in 2017 for this    Overall I think she should continue her therapy, she has to keep moving or shoes on the edge of being wheelchair-bound    She should continue to follow with Pain Management, rheumatology, she could also consider PMNR  She can discuss home therapy with her primary care provider as it is getting harder and harder to get out of the house  We discussed with her daughter that she is going to require more more full-time care, she has started working on logistics for    No orders of the defined types were placed in this encounter.      Past Medical History:   Diagnosis Date    Acute hypoxemic respiratory failure 4/11/2018    Altered mental status     Anemia     Arthritis     Bilateral hand pain 3/14/2018    Branch retinal vein occlusion, left eye 2/20/2015    Chronic bilateral low back pain without sciatica  3/23/2017    Chronic renal failure in pediatric patient, stage 3 (moderate) 4/15/2018    Cognitive communication deficit 12/19/2017    Cystoid macular edema, left eye 2/20/2015    Cystoid macular edema, left eye 2/20/2015    DJD (degenerative joint disease) of cervical spine 5/15/2013    Fatty liver 8/26/2014    Goiter 4/9/2018    Hashimoto's disease     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    Hypertension     Hypertensive encephalopathy     IBS (irritable bowel syndrome) 6/21/2017    IGT (impaired glucose tolerance) 1/12/2016    Iron deficiency anemia secondary to inadequate dietary iron intake 6/24/2013    Joint pain     Keratoconjunctivitis sicca of both eyes not specified as Sjogren's 7/29/2016    Leiomyoma of uterus, unspecified 9/16/2014    Long QT interval 6/29/2016    Due to medication (plaquenil)     Macular edema 1/12/2015    Multinodular goiter 1/12/2016    Neuropathy 8/23/2017    Plaquenil causing adverse effect in therapeutic use 10/7/2016    Pneumococcal vaccine refused 8/17/2016    Pneumonia due to Streptococcus pneumoniae 4/5/2018    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/2015    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    s/p Right Total knee replacement 5/15/2013    Scoliosis of thoracic spine 5/15/2013    Small vessel disease, cerebrovascular 12/28/2017    Stroke     UTI (urinary tract infection) 12/29/2018    Vascular dementia 12/6/2017       Past Surgical History:   Procedure Laterality Date    BREAST CYST EXCISION      CATARACT EXTRACTION      COLONOSCOPY N/A 9/29/2015    Procedure: COLONOSCOPY;  Surgeon: FIDELINA Sanchez MD;  Location: [address];  Service: Endoscopy;  Laterality: N/A;    EYE SURGERY      JOINT REPLACEMENT      right knee    KNEE SURGERY Left 12/31/2013    TKR    left parotidectomy      mixed tumor    SALIVARY GLAND SURGERY      TONSILLECTOMY         Family History   Problem Relation Age of Onset    Hypertension Mother      Heart disease Mother     Hyperthyroidism Mother     Prostate cancer Father         prostate cancer    Cancer Father     Breast cancer Maternal Grandmother     Hyperthyroidism Other        Social History     Socioeconomic History    Marital status:      Spouse name: Not on file    Number of children: Not on file    Years of education: Not on file    Highest education level: Not on file   Occupational History    Not on file   Social Needs    Financial resource strain: Not hard at all    Food insecurity:     Worry: Never true     Inability: Never true    Transportation needs:     Medical: No     Non-medical: No   Tobacco Use    Smoking status: Never Smoker    Smokeless tobacco: Never Used   Substance and Sexual Activity    Alcohol use: Yes     Alcohol/week: 0.0 standard drinks     Frequency: Monthly or less     Drinks per session: 1 or 2     Binge frequency: Never     Comment: very seldom     Drug use: No    Sexual activity: Not Currently     Comment: ,  age 52,    Lifestyle    Physical activity:     Days per week: 1 day     Minutes per session: 40 min    Stress: To some extent   Relationships    Social connections:     Talks on phone: More than three times a week     Gets together: More than three times a week     Attends Sikh service: Not on file     Active member of club or organization: Yes     Attends meetings of clubs or organizations: 1 to 4 times per year     Relationship status:    Other Topics Concern    Are you pregnant or think you may be? No    Breast-feeding No   Social History Narrative    Not on file

## 2020-02-21 NOTE — LETTER
February 22, 2020      Bhargav Lee MD  [address]  [facility] [address]           [facility]  [address]  [facility] [address]  Phone: [phone]          Patient: Selma Lux   MR Number: [id number]   YOB: 1935   Date of Visit: 2/21/2020       Dear Dr. Bhargav Lee:    Thank you for referring Selma Lux to me for evaluation. Attached you will find relevant portions of my assessment and plan of care.    If you have questions, please do not hesitate to call me. I look forward to following Selma Lux along with you.    Sincerely,    Yonny Abrams III, MD    Enclosure  CC:  No Recipients    If you would like to receive this communication electronically, please contact [email or web address] or [phone] to request more information on [employer] [employer] access.    For providers and/or their staff who would like to refer a patient to [facility], please contact us through our one-stop-shop provider referral line, [facility] [place], at [phone].    If you feel you have received this communication in error or would no longer like to receive these types of communications, please e-mail [email or web address]

## 2020-02-26 ENCOUNTER — TELEPHONE (OUTPATIENT)
Dept: NEUROLOGY | Facility: CLINIC | Age: 85
End: 2020-02-26

## 2020-02-26 NOTE — TELEPHONE ENCOUNTER
----- Message from Shelby Suazo sent at 2/26/2020  8:08 AM CST -----  Contact: Rosy (dtr) @ [phone]  Caller is asking to have patient scheduled to see Dr. Giang for memory as soon as possible, says patient had a very bad day. Please call.

## 2020-02-27 ENCOUNTER — HOSPITAL ENCOUNTER (INPATIENT)
Facility: HOSPITAL | Age: 85
LOS: 12 days | Discharge: NURSING, REHAB OR LONG-TERM CARE | DRG: 689 | End: 2020-03-10
Attending: EMERGENCY MEDICINE | Admitting: HOSPITALIST
Payer: MEDICARE

## 2020-02-27 DIAGNOSIS — J84.116 CRYPTOGENIC ORGANIZING PNEUMONIA: ICD-10-CM

## 2020-02-27 DIAGNOSIS — I50.9 CHF (CONGESTIVE HEART FAILURE): ICD-10-CM

## 2020-02-27 DIAGNOSIS — R06.02 SOB (SHORTNESS OF BREATH): ICD-10-CM

## 2020-02-27 DIAGNOSIS — J96.01 ACUTE RESPIRATORY FAILURE WITH HYPOXIA: ICD-10-CM

## 2020-02-27 DIAGNOSIS — R53.83 FATIGUE: ICD-10-CM

## 2020-02-27 DIAGNOSIS — R53.81 DEBILITY: ICD-10-CM

## 2020-02-27 DIAGNOSIS — R53.1 WEAKNESS: ICD-10-CM

## 2020-02-27 DIAGNOSIS — M05.79 SEROPOSITIVE RHEUMATOID ARTHRITIS OF MULTIPLE SITES: ICD-10-CM

## 2020-02-27 DIAGNOSIS — N30.00 ACUTE CYSTITIS WITHOUT HEMATURIA: Primary | ICD-10-CM

## 2020-02-27 DIAGNOSIS — R07.9 CHEST PAIN: ICD-10-CM

## 2020-02-27 PROBLEM — M19.90 ARTHRITIS: Status: ACTIVE | Noted: 2020-02-27

## 2020-02-27 LAB
BACTERIA #/AREA URNS AUTO: ABNORMAL /HPF
BASOPHILS # BLD AUTO: 0.03 K/UL (ref 0–0.2)
BASOPHILS NFR BLD: 0.3 % (ref 0–1.9)
BILIRUB UR QL STRIP: NEGATIVE
CLARITY UR REFRACT.AUTO: ABNORMAL
COLOR UR AUTO: YELLOW
DIFFERENTIAL METHOD: ABNORMAL
EOSINOPHIL # BLD AUTO: 0.3 K/UL (ref 0–0.5)
EOSINOPHIL NFR BLD: 2.9 % (ref 0–8)
ERYTHROCYTE [DISTWIDTH] IN BLOOD BY AUTOMATED COUNT: 15.6 % (ref 11.5–14.5)
GLUCOSE UR QL STRIP: NEGATIVE
HCT VFR BLD AUTO: 40.9 % (ref 37–48.5)
HGB BLD-MCNC: 11.9 G/DL (ref 12–16)
HGB UR QL STRIP: ABNORMAL
IMM GRANULOCYTES # BLD AUTO: 0.06 K/UL (ref 0–0.04)
IMM GRANULOCYTES NFR BLD AUTO: 0.5 % (ref 0–0.5)
KETONES UR QL STRIP: NEGATIVE
LEUKOCYTE ESTERASE UR QL STRIP: ABNORMAL
LYMPHOCYTES # BLD AUTO: 2 K/UL (ref 1–4.8)
LYMPHOCYTES NFR BLD: 16.9 % (ref 18–48)
MCH RBC QN AUTO: 25.9 PG (ref 27–31)
MCHC RBC AUTO-ENTMCNC: 29.1 G/DL (ref 32–36)
MCV RBC AUTO: 89 FL (ref 82–98)
MICROSCOPIC COMMENT: ABNORMAL
MONOCYTES # BLD AUTO: 3.3 K/UL (ref 0.3–1)
MONOCYTES NFR BLD: 28.6 % (ref 4–15)
NEUTROPHILS # BLD AUTO: 5.9 K/UL (ref 1.8–7.7)
NEUTROPHILS NFR BLD: 50.8 % (ref 38–73)
NITRITE UR QL STRIP: POSITIVE
NRBC BLD-RTO: 0 /100 WBC
PH UR STRIP: 5 [PH] (ref 5–8)
PLATELET # BLD AUTO: 266 K/UL (ref 150–350)
PLATELET BLD QL SMEAR: ABNORMAL
PMV BLD AUTO: 10.4 FL (ref 9.2–12.9)
PROT UR QL STRIP: NEGATIVE
RBC # BLD AUTO: 4.6 M/UL (ref 4–5.4)
RBC #/AREA URNS AUTO: 1 /HPF (ref 0–4)
SP GR UR STRIP: 1 (ref 1–1.03)
SQUAMOUS #/AREA URNS AUTO: 1 /HPF
URN SPEC COLLECT METH UR: ABNORMAL
WBC # BLD AUTO: 11.55 K/UL (ref 3.9–12.7)
WBC #/AREA URNS AUTO: 98 /HPF (ref 0–5)
WBC CLUMPS UR QL AUTO: ABNORMAL

## 2020-02-27 PROCEDURE — 12000002 HC ACUTE/MED SURGE SEMI-PRIVATE ROOM

## 2020-02-27 PROCEDURE — 87186 SC STD MICRODIL/AGAR DIL: CPT

## 2020-02-27 PROCEDURE — 93005 ELECTROCARDIOGRAM TRACING: CPT

## 2020-02-27 PROCEDURE — 93010 EKG 12-LEAD: ICD-10-PCS | Mod: ,,, | Performed by: INTERNAL MEDICINE

## 2020-02-27 PROCEDURE — 85025 COMPLETE CBC W/AUTO DIFF WBC: CPT

## 2020-02-27 PROCEDURE — 87086 URINE CULTURE/COLONY COUNT: CPT

## 2020-02-27 PROCEDURE — 81001 URINALYSIS AUTO W/SCOPE: CPT

## 2020-02-27 PROCEDURE — 99223 PR INITIAL HOSPITAL CARE,LEVL III: ICD-10-PCS | Mod: ,,, | Performed by: INTERNAL MEDICINE

## 2020-02-27 PROCEDURE — 99223 1ST HOSP IP/OBS HIGH 75: CPT | Mod: ,,, | Performed by: INTERNAL MEDICINE

## 2020-02-27 PROCEDURE — 99285 EMERGENCY DEPT VISIT HI MDM: CPT | Mod: 25

## 2020-02-27 PROCEDURE — 25000003 PHARM REV CODE 250: Performed by: INTERNAL MEDICINE

## 2020-02-27 PROCEDURE — 63600175 PHARM REV CODE 636 W HCPCS: Performed by: INTERNAL MEDICINE

## 2020-02-27 PROCEDURE — 87077 CULTURE AEROBIC IDENTIFY: CPT

## 2020-02-27 PROCEDURE — 99285 EMERGENCY DEPT VISIT HI MDM: CPT | Mod: ,,, | Performed by: EMERGENCY MEDICINE

## 2020-02-27 PROCEDURE — 93010 ELECTROCARDIOGRAM REPORT: CPT | Mod: ,,, | Performed by: INTERNAL MEDICINE

## 2020-02-27 PROCEDURE — 87088 URINE BACTERIA CULTURE: CPT

## 2020-02-27 PROCEDURE — 99285 PR EMERGENCY DEPT VISIT,LEVEL V: ICD-10-PCS | Mod: ,,, | Performed by: EMERGENCY MEDICINE

## 2020-02-27 RX ORDER — GLUCAGON 1 MG
1 KIT INJECTION
Status: DISCONTINUED | OUTPATIENT
Start: 2020-02-27 | End: 2020-03-10 | Stop reason: HOSPADM

## 2020-02-27 RX ORDER — CEFTRIAXONE 1 G/1
1 INJECTION, POWDER, FOR SOLUTION INTRAMUSCULAR; INTRAVENOUS
Status: COMPLETED | OUTPATIENT
Start: 2020-02-27 | End: 2020-02-27

## 2020-02-27 RX ORDER — HYDROXYCHLOROQUINE SULFATE 200 MG/1
200 TABLET, FILM COATED ORAL 2 TIMES DAILY
Status: DISCONTINUED | OUTPATIENT
Start: 2020-02-27 | End: 2020-03-02

## 2020-02-27 RX ORDER — PROMETHAZINE HYDROCHLORIDE 12.5 MG/1
25 TABLET ORAL EVERY 6 HOURS PRN
Status: DISCONTINUED | OUTPATIENT
Start: 2020-02-27 | End: 2020-03-10 | Stop reason: HOSPADM

## 2020-02-27 RX ORDER — TALC
6 POWDER (GRAM) TOPICAL NIGHTLY PRN
Status: DISCONTINUED | OUTPATIENT
Start: 2020-02-27 | End: 2020-03-10 | Stop reason: HOSPADM

## 2020-02-27 RX ORDER — PREDNISONE 5 MG/1
5 TABLET ORAL DAILY
Status: DISCONTINUED | OUTPATIENT
Start: 2020-02-28 | End: 2020-03-04

## 2020-02-27 RX ORDER — ONDANSETRON 8 MG/1
8 TABLET, ORALLY DISINTEGRATING ORAL EVERY 8 HOURS PRN
Status: DISCONTINUED | OUTPATIENT
Start: 2020-02-27 | End: 2020-03-10 | Stop reason: HOSPADM

## 2020-02-27 RX ORDER — ASPIRIN 81 MG/1
81 TABLET ORAL DAILY
Status: DISCONTINUED | OUTPATIENT
Start: 2020-02-28 | End: 2020-03-10 | Stop reason: HOSPADM

## 2020-02-27 RX ORDER — IBUPROFEN 200 MG
24 TABLET ORAL
Status: DISCONTINUED | OUTPATIENT
Start: 2020-02-27 | End: 2020-03-10 | Stop reason: HOSPADM

## 2020-02-27 RX ORDER — ENOXAPARIN SODIUM 100 MG/ML
40 INJECTION SUBCUTANEOUS EVERY 24 HOURS
Status: DISCONTINUED | OUTPATIENT
Start: 2020-02-27 | End: 2020-03-10 | Stop reason: HOSPADM

## 2020-02-27 RX ORDER — DIAZEPAM 5 MG/1
5 TABLET ORAL DAILY PRN
Status: DISCONTINUED | OUTPATIENT
Start: 2020-02-27 | End: 2020-02-29

## 2020-02-27 RX ORDER — ATORVASTATIN CALCIUM 20 MG/1
40 TABLET, FILM COATED ORAL DAILY
Status: DISCONTINUED | OUTPATIENT
Start: 2020-02-28 | End: 2020-03-10 | Stop reason: HOSPADM

## 2020-02-27 RX ORDER — AZITHROMYCIN 250 MG/1
500 TABLET, FILM COATED ORAL DAILY
Status: DISCONTINUED | OUTPATIENT
Start: 2020-02-28 | End: 2020-02-27

## 2020-02-27 RX ORDER — GABAPENTIN 100 MG/1
100 CAPSULE ORAL EVERY MORNING
Status: DISCONTINUED | OUTPATIENT
Start: 2020-02-28 | End: 2020-02-29

## 2020-02-27 RX ORDER — CEFTRIAXONE 1 G/1
1 INJECTION, POWDER, FOR SOLUTION INTRAMUSCULAR; INTRAVENOUS
Status: DISCONTINUED | OUTPATIENT
Start: 2020-02-28 | End: 2020-03-01

## 2020-02-27 RX ORDER — SODIUM CHLORIDE 0.9 % (FLUSH) 0.9 %
10 SYRINGE (ML) INJECTION
Status: DISCONTINUED | OUTPATIENT
Start: 2020-02-27 | End: 2020-03-10 | Stop reason: HOSPADM

## 2020-02-27 RX ORDER — AZITHROMYCIN 250 MG/1
500 TABLET, FILM COATED ORAL DAILY
Status: DISCONTINUED | OUTPATIENT
Start: 2020-02-27 | End: 2020-03-01

## 2020-02-27 RX ORDER — FUROSEMIDE 40 MG/1
40 TABLET ORAL DAILY
Status: DISCONTINUED | OUTPATIENT
Start: 2020-02-28 | End: 2020-03-10 | Stop reason: HOSPADM

## 2020-02-27 RX ORDER — TRAMADOL HYDROCHLORIDE 50 MG/1
50 TABLET ORAL EVERY 6 HOURS PRN
Status: DISCONTINUED | OUTPATIENT
Start: 2020-02-27 | End: 2020-03-10 | Stop reason: HOSPADM

## 2020-02-27 RX ORDER — POLYETHYLENE GLYCOL 3350 17 G/17G
17 POWDER, FOR SOLUTION ORAL 2 TIMES DAILY PRN
Status: DISCONTINUED | OUTPATIENT
Start: 2020-02-27 | End: 2020-03-10 | Stop reason: HOSPADM

## 2020-02-27 RX ORDER — IBUPROFEN 200 MG
16 TABLET ORAL
Status: DISCONTINUED | OUTPATIENT
Start: 2020-02-27 | End: 2020-03-10 | Stop reason: HOSPADM

## 2020-02-27 RX ORDER — PANTOPRAZOLE SODIUM 40 MG/1
40 TABLET, DELAYED RELEASE ORAL DAILY
Status: DISCONTINUED | OUTPATIENT
Start: 2020-02-28 | End: 2020-03-10 | Stop reason: HOSPADM

## 2020-02-27 RX ORDER — AMLODIPINE BESYLATE 10 MG/1
10 TABLET ORAL DAILY
Status: DISCONTINUED | OUTPATIENT
Start: 2020-02-28 | End: 2020-02-28

## 2020-02-27 RX ORDER — GABAPENTIN 300 MG/1
300 CAPSULE ORAL NIGHTLY
Status: DISCONTINUED | OUTPATIENT
Start: 2020-02-27 | End: 2020-03-02

## 2020-02-27 RX ORDER — ACETAMINOPHEN 325 MG/1
650 TABLET ORAL EVERY 8 HOURS PRN
Status: DISCONTINUED | OUTPATIENT
Start: 2020-02-27 | End: 2020-03-10 | Stop reason: HOSPADM

## 2020-02-27 RX ORDER — ALBUTEROL SULFATE 90 UG/1
2 AEROSOL, METERED RESPIRATORY (INHALATION) EVERY 6 HOURS PRN
Status: DISCONTINUED | OUTPATIENT
Start: 2020-02-27 | End: 2020-03-10 | Stop reason: HOSPADM

## 2020-02-27 RX ORDER — BACLOFEN 10 MG/1
10 TABLET ORAL DAILY PRN
Status: DISCONTINUED | OUTPATIENT
Start: 2020-02-27 | End: 2020-03-10 | Stop reason: HOSPADM

## 2020-02-27 RX ADMIN — HYDROXYCHLOROQUINE SULFATE 200 MG: 200 TABLET, FILM COATED ORAL at 11:02

## 2020-02-27 RX ADMIN — ENOXAPARIN SODIUM 40 MG: 100 INJECTION SUBCUTANEOUS at 11:02

## 2020-02-27 RX ADMIN — GABAPENTIN 300 MG: 300 CAPSULE ORAL at 11:02

## 2020-02-27 RX ADMIN — CEFTRIAXONE SODIUM 1 G: 1 INJECTION, POWDER, FOR SOLUTION INTRAMUSCULAR; INTRAVENOUS at 11:02

## 2020-02-28 LAB
ALBUMIN SERPL BCP-MCNC: 3.3 G/DL (ref 3.5–5.2)
ALP SERPL-CCNC: 76 U/L (ref 55–135)
ALT SERPL W/O P-5'-P-CCNC: 10 U/L (ref 10–44)
ANION GAP SERPL CALC-SCNC: 12 MMOL/L (ref 8–16)
ANION GAP SERPL CALC-SCNC: 14 MMOL/L (ref 8–16)
ANISOCYTOSIS BLD QL SMEAR: SLIGHT
AST SERPL-CCNC: 22 U/L (ref 10–40)
BASOPHILS # BLD AUTO: 0.03 K/UL (ref 0–0.2)
BASOPHILS NFR BLD: 0.3 % (ref 0–1.9)
BILIRUB SERPL-MCNC: 1 MG/DL (ref 0.1–1)
BUN SERPL-MCNC: 14 MG/DL (ref 8–23)
BUN SERPL-MCNC: 15 MG/DL (ref 8–23)
BURR CELLS BLD QL SMEAR: ABNORMAL
CALCIUM SERPL-MCNC: 9.1 MG/DL (ref 8.7–10.5)
CALCIUM SERPL-MCNC: 9.4 MG/DL (ref 8.7–10.5)
CHLORIDE SERPL-SCNC: 103 MMOL/L (ref 95–110)
CHLORIDE SERPL-SCNC: 103 MMOL/L (ref 95–110)
CK SERPL-CCNC: 93 U/L (ref 20–180)
CO2 SERPL-SCNC: 22 MMOL/L (ref 23–29)
CO2 SERPL-SCNC: 24 MMOL/L (ref 23–29)
CREAT SERPL-MCNC: 1.1 MG/DL (ref 0.5–1.4)
CREAT SERPL-MCNC: 1.3 MG/DL (ref 0.5–1.4)
DIFFERENTIAL METHOD: ABNORMAL
EOSINOPHIL # BLD AUTO: 0.4 K/UL (ref 0–0.5)
EOSINOPHIL NFR BLD: 3.5 % (ref 0–8)
ERYTHROCYTE [DISTWIDTH] IN BLOOD BY AUTOMATED COUNT: 15.5 % (ref 11.5–14.5)
EST. GFR  (AFRICAN AMERICAN): 44.1 ML/MIN/1.73 M^2
EST. GFR  (AFRICAN AMERICAN): 54 ML/MIN/1.73 M^2
EST. GFR  (NON AFRICAN AMERICAN): 38.3 ML/MIN/1.73 M^2
EST. GFR  (NON AFRICAN AMERICAN): 46.9 ML/MIN/1.73 M^2
GLUCOSE SERPL-MCNC: 64 MG/DL (ref 70–110)
GLUCOSE SERPL-MCNC: 67 MG/DL (ref 70–110)
HCT VFR BLD AUTO: 41.5 % (ref 37–48.5)
HGB BLD-MCNC: 12.4 G/DL (ref 12–16)
IMM GRANULOCYTES # BLD AUTO: 0.05 K/UL (ref 0–0.04)
IMM GRANULOCYTES NFR BLD AUTO: 0.5 % (ref 0–0.5)
LACTATE SERPL-SCNC: 1.1 MMOL/L (ref 0.5–2.2)
LYMPHOCYTES # BLD AUTO: 1.9 K/UL (ref 1–4.8)
LYMPHOCYTES NFR BLD: 18.5 % (ref 18–48)
MAGNESIUM SERPL-MCNC: 1.7 MG/DL (ref 1.6–2.6)
MCH RBC QN AUTO: 26.1 PG (ref 27–31)
MCHC RBC AUTO-ENTMCNC: 29.9 G/DL (ref 32–36)
MCV RBC AUTO: 87 FL (ref 82–98)
MONOCYTES # BLD AUTO: 2.8 K/UL (ref 0.3–1)
MONOCYTES NFR BLD: 27.6 % (ref 4–15)
NEUTROPHILS # BLD AUTO: 5.1 K/UL (ref 1.8–7.7)
NEUTROPHILS NFR BLD: 49.6 % (ref 38–73)
NRBC BLD-RTO: 0 /100 WBC
OVALOCYTES BLD QL SMEAR: ABNORMAL
PHOSPHATE SERPL-MCNC: 4 MG/DL (ref 2.7–4.5)
PLATELET # BLD AUTO: 267 K/UL (ref 150–350)
PLATELET BLD QL SMEAR: ABNORMAL
PMV BLD AUTO: 10.3 FL (ref 9.2–12.9)
POCT GLUCOSE: 110 MG/DL (ref 70–110)
POCT GLUCOSE: 110 MG/DL (ref 70–110)
POCT GLUCOSE: 119 MG/DL (ref 70–110)
POCT GLUCOSE: 180 MG/DL (ref 70–110)
POCT GLUCOSE: 45 MG/DL (ref 70–110)
POCT GLUCOSE: 48 MG/DL (ref 70–110)
POCT GLUCOSE: 63 MG/DL (ref 70–110)
POIKILOCYTOSIS BLD QL SMEAR: SLIGHT
POLYCHROMASIA BLD QL SMEAR: ABNORMAL
POTASSIUM SERPL-SCNC: 3.5 MMOL/L (ref 3.5–5.1)
POTASSIUM SERPL-SCNC: 4.3 MMOL/L (ref 3.5–5.1)
PROT SERPL-MCNC: 6.8 G/DL (ref 6–8.4)
RBC # BLD AUTO: 4.75 M/UL (ref 4–5.4)
SODIUM SERPL-SCNC: 139 MMOL/L (ref 136–145)
SODIUM SERPL-SCNC: 139 MMOL/L (ref 136–145)
T4 FREE SERPL-MCNC: 1.43 NG/DL (ref 0.71–1.51)
TSH SERPL DL<=0.005 MIU/L-ACNC: 0.4 UIU/ML (ref 0.4–4)
WBC # BLD AUTO: 10.27 K/UL (ref 3.9–12.7)

## 2020-02-28 PROCEDURE — 63600175 PHARM REV CODE 636 W HCPCS: Performed by: INTERNAL MEDICINE

## 2020-02-28 PROCEDURE — 80048 BASIC METABOLIC PNL TOTAL CA: CPT

## 2020-02-28 PROCEDURE — 84443 ASSAY THYROID STIM HORMONE: CPT

## 2020-02-28 PROCEDURE — 84439 ASSAY OF FREE THYROXINE: CPT

## 2020-02-28 PROCEDURE — 97112 NEUROMUSCULAR REEDUCATION: CPT

## 2020-02-28 PROCEDURE — 83735 ASSAY OF MAGNESIUM: CPT

## 2020-02-28 PROCEDURE — 25000003 PHARM REV CODE 250: Performed by: INTERNAL MEDICINE

## 2020-02-28 PROCEDURE — 99231 SBSQ HOSP IP/OBS SF/LOW 25: CPT | Mod: ,,, | Performed by: INTERNAL MEDICINE

## 2020-02-28 PROCEDURE — 99231 PR SUBSEQUENT HOSPITAL CARE,LEVL I: ICD-10-PCS | Mod: ,,, | Performed by: INTERNAL MEDICINE

## 2020-02-28 PROCEDURE — 97530 THERAPEUTIC ACTIVITIES: CPT

## 2020-02-28 PROCEDURE — 87040 BLOOD CULTURE FOR BACTERIA: CPT

## 2020-02-28 PROCEDURE — 82550 ASSAY OF CK (CPK): CPT

## 2020-02-28 PROCEDURE — 80053 COMPREHEN METABOLIC PANEL: CPT

## 2020-02-28 PROCEDURE — 83605 ASSAY OF LACTIC ACID: CPT

## 2020-02-28 PROCEDURE — 84100 ASSAY OF PHOSPHORUS: CPT

## 2020-02-28 PROCEDURE — 85025 COMPLETE CBC W/AUTO DIFF WBC: CPT

## 2020-02-28 PROCEDURE — 97166 OT EVAL MOD COMPLEX 45 MIN: CPT

## 2020-02-28 PROCEDURE — 11000001 HC ACUTE MED/SURG PRIVATE ROOM

## 2020-02-28 PROCEDURE — 97162 PT EVAL MOD COMPLEX 30 MIN: CPT

## 2020-02-28 RX ORDER — DEXTROSE MONOHYDRATE 50 MG/ML
INJECTION, SOLUTION INTRAVENOUS CONTINUOUS
Status: DISCONTINUED | OUTPATIENT
Start: 2020-02-28 | End: 2020-02-28

## 2020-02-28 RX ADMIN — AZITHROMYCIN MONOHYDRATE 500 MG: 250 TABLET ORAL at 12:02

## 2020-02-28 RX ADMIN — CEFTRIAXONE SODIUM 1 G: 1 INJECTION, POWDER, FOR SOLUTION INTRAMUSCULAR; INTRAVENOUS at 01:02

## 2020-02-28 RX ADMIN — HYDROXYCHLOROQUINE SULFATE 200 MG: 200 TABLET, FILM COATED ORAL at 08:02

## 2020-02-28 RX ADMIN — AZITHROMYCIN MONOHYDRATE 500 MG: 250 TABLET ORAL at 09:02

## 2020-02-28 RX ADMIN — ENOXAPARIN SODIUM 40 MG: 100 INJECTION SUBCUTANEOUS at 04:02

## 2020-02-28 RX ADMIN — FUROSEMIDE 40 MG: 40 TABLET ORAL at 10:02

## 2020-02-28 RX ADMIN — GABAPENTIN 100 MG: 100 CAPSULE ORAL at 07:02

## 2020-02-28 RX ADMIN — POLYETHYLENE GLYCOL 3350 17 G: 17 POWDER, FOR SOLUTION ORAL at 11:02

## 2020-02-28 RX ADMIN — GABAPENTIN 300 MG: 300 CAPSULE ORAL at 08:02

## 2020-02-28 RX ADMIN — ATORVASTATIN CALCIUM 40 MG: 20 TABLET, FILM COATED ORAL at 10:02

## 2020-02-28 RX ADMIN — AMLODIPINE BESYLATE 10 MG: 10 TABLET ORAL at 10:02

## 2020-02-28 RX ADMIN — ASPIRIN 81 MG: 81 TABLET, COATED ORAL at 10:02

## 2020-02-28 RX ADMIN — PREDNISONE 5 MG: 5 TABLET ORAL at 10:02

## 2020-02-28 RX ADMIN — PANTOPRAZOLE SODIUM 40 MG: 40 TABLET, DELAYED RELEASE ORAL at 10:02

## 2020-02-28 RX ADMIN — HYDROXYCHLOROQUINE SULFATE 200 MG: 200 TABLET, FILM COATED ORAL at 10:02

## 2020-02-28 NOTE — ED PROVIDER NOTES
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 Daughter at bedside reports she has been more disoriented, lethargic, and weak over the past 2 weeks. Patient has frequently been confused about where she is and disoriented to time. This morning she kept repeating that "she was late and needed to go see her patients", though she's been retired for over 10 years. Other family members have noted her to be delirious with possible visual hallucinations and pointing/talking to people who weren't there. Her daughter states she has been sleeping more than usual throughout the day and overall fatigues very quickly. She has chronic hip and sciatic pain that limits her mobility, but has been complaining about "leg weakness" and being "unable to walk" the past couple days. The daughter tried to make an appointment with neurology (Dr. Giang) due to these concerns, but he was unavailable this week so was told to come to the ED for further evaluation.     Review of patient's allergies indicates:  No Known Allergies  Past Medical History:   Diagnosis Date    Acute hypoxemic respiratory failure 4/11/2018    Altered mental status     Anemia     Arthritis     Bilateral hand pain 3/14/2018    Branch retinal vein occlusion, left eye 2/20/2015    Chronic bilateral low back pain without sciatica 3/23/2017    Chronic renal failure in pediatric patient, stage 3 (moderate) 4/15/2018    Cognitive communication deficit 12/19/2017    Cystoid macular edema, left eye 2/20/2015    " Cystoid macular edema, left eye 2/20/2015    DJD (degenerative joint disease) of cervical spine 5/15/2013    Fatty liver 8/26/2014    Goiter 4/9/2018    Hashimoto's disease     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    Hypertension     Hypertensive encephalopathy     IBS (irritable bowel syndrome) 6/21/2017    IGT (impaired glucose tolerance) 1/12/2016    Iron deficiency anemia secondary to inadequate dietary iron intake 6/24/2013    Joint pain     Keratoconjunctivitis sicca of both eyes not specified as Sjogren's 7/29/2016    Leiomyoma of uterus, unspecified 9/16/2014    Long QT interval 6/29/2016    Due to medication (plaquenil)     Macular edema 1/12/2015    Multinodular goiter 1/12/2016    Neuropathy 8/23/2017    Plaquenil causing adverse effect in therapeutic use 10/7/2016    Pneumococcal vaccine refused 8/17/2016    Pneumonia due to Streptococcus pneumoniae 4/5/2018    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/2015    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    s/p Right Total knee replacement 5/15/2013    Scoliosis of thoracic spine 5/15/2013    Small vessel disease, cerebrovascular 12/28/2017    Stroke     UTI (urinary tract infection) 12/29/2018    Vascular dementia 12/6/2017     Past Surgical History:   Procedure Laterality Date    BREAST CYST EXCISION      CATARACT EXTRACTION      COLONOSCOPY N/A 9/29/2015    Procedure: COLONOSCOPY;  Surgeon: FIDELINA Sanchez MD;  Location: [address]);  Service: Endoscopy;  Laterality: N/A;    EYE SURGERY      JOINT REPLACEMENT      right knee    KNEE SURGERY Left 12/31/2013    TKR    left parotidectomy      mixed tumor    SALIVARY GLAND SURGERY      TONSILLECTOMY       Family History   Problem Relation Age of Onset    Hypertension Mother     Heart disease Mother     Hyperthyroidism Mother     Prostate cancer Father         prostate cancer    Cancer Father     Breast cancer Maternal Grandmother     Hyperthyroidism Other       Social History     Tobacco Use    Smoking status: Never Smoker    Smokeless tobacco: Never Used   Substance Use Topics    Alcohol use: Yes     Alcohol/week: 0.0 standard drinks     Frequency: Monthly or less     Drinks per session: 1 or 2     Binge frequency: Never     Comment: very seldom     Drug use: No     Review of Systems   Constitutional: Positive for activity change, appetite change and fatigue. Negative for chills and fever.   HENT: Negative for congestion, sore throat and trouble swallowing.    Eyes: Negative for visual disturbance.   Respiratory: Negative for cough, chest tightness and shortness of breath.    Cardiovascular: Positive for leg swelling. Negative for chest pain.   Gastrointestinal: Negative for abdominal pain, constipation, diarrhea, nausea and vomiting.   Genitourinary: Negative for difficulty urinating, dysuria and hematuria.   Musculoskeletal: Positive for arthralgias (b/l hips R>L), back pain and gait problem.   Skin: Negative for rash and wound.   Allergic/Immunologic: Positive for immunocompromised state.   Neurological: Positive for weakness. Negative for syncope, numbness and headaches.   Psychiatric/Behavioral: Positive for confusion and hallucinations.       Physical Exam     Initial Vitals [02/27/20 1540]   BP Pulse Resp Temp SpO2   (!) 117/57 100 20 99.1 °F (37.3 °C) (!) 93 %      MAP       --         Physical Exam    Constitutional: She appears well-developed and well-nourished. She is not diaphoretic. No distress.   AAF sleeping in no distress   HENT:   Head: Normocephalic and atraumatic.   Mouth/Throat: Oropharynx is clear and moist.   Eyes: Conjunctivae and EOM are normal. Pupils are equal, round, and reactive to light. No scleral icterus.   Neck: Normal range of motion. Neck supple.   Cardiovascular: Normal rate, regular rhythm, normal heart sounds and intact distal pulses.   No murmur heard.  Pulmonary/Chest: No respiratory distress. She has no wheezes. She has  rales (mild coarse crackles b/l ).   Abdominal: Soft. Bowel sounds are normal. She exhibits no distension. There is no tenderness.   Musculoskeletal: Normal range of motion. She exhibits edema (1+ pitting edema b/l). She exhibits no tenderness.   5/5 strength BUE throughout  4/5 strength BLE proximally   Neurological: She is alert.   Oriented to self and place, not time  Lethargic, but arousable.   Slow to respond. Answers questions appropriately.   Follows commands   Skin: Skin is warm and dry.   Psychiatric: She has a normal mood and affect.         ED Course   Procedures  Labs Reviewed   CBC W/ AUTO DIFFERENTIAL - Abnormal; Notable for the following components:       Result Value    Hemoglobin 11.9 (*)     Mean Corpuscular Hemoglobin 25.9 (*)     Mean Corpuscular Hemoglobin Conc 29.1 (*)     RDW 15.6 (*)     Immature Grans (Abs) 0.06 (*)     Mono # 3.3 (*)     Lymph% 16.9 (*)     Mono% 28.6 (*)     All other components within normal limits   URINALYSIS, REFLEX TO URINE CULTURE - Abnormal; Notable for the following components:    Appearance, UA Hazy (*)     Occult Blood UA 1+ (*)     Nitrite, UA Positive (*)     Leukocytes, UA 3+ (*)     All other components within normal limits    Narrative:     Preferred Collection Type->Urine, Clean Catch   URINALYSIS MICROSCOPIC - Abnormal; Notable for the following components:    WBC, UA 98 (*)     Bacteria Many (*)     All other components within normal limits    Narrative:     Preferred Collection Type->Urine, Clean Catch        ECG Results          EKG 12-lead (Final result)  Result time 02/28/20 16:16:35    Final result by Interface, Lab In [facility] (02/28/20 16:16:35)                 Narrative:    Test Reason : R53.83,    Vent. Rate : 110 BPM     Atrial Rate : 110 BPM     P-R Int : 148 ms          QRS Dur : 088 ms      QT Int : 230 ms       P-R-T Axes : 042 -23 -10 degrees     QTc Int : 311 ms    Sinus tachycardia with occasional Premature ventricular complexes  Moderate  voltage criteria for LVH, may be normal variant  Nonspecific ST and T wave abnormality  Abnormal ECG  When compared with ECG of 26-AUG-2019 22:59,  Nonspecific T wave abnormality, worse in Anterior-lateral leads  Confirmed by Edgar Howard MD ([id number]) on 2/28/2020 4:16:23 PM    Referred By:             Confirmed By:Edgar Howard MD                            Imaging Results          X-Ray Chest AP Portable (Final result)  Result time 02/27/20 21:36:09    Final result by Juan Carlos Maria MD (02/27/20 21:36:09)                 Impression:      Patchy airspace infiltrates in the left upper lung field and right lower lung field which could represent pneumonia or aspiration.      Electronically signed by: Juan Carlos Maria MD  Date:    02/27/2020  Time:    21:36             Narrative:    EXAMINATION:  XR CHEST AP PORTABLE    CLINICAL HISTORY:  Shortness of breath    TECHNIQUE:  Single frontal view of the chest was performed.    COMPARISON:  August 26, 2019.    FINDINGS:  Patchy airspace infiltrates in the left upper lung field and right lower lung field.    No pleural effusion or pneumothorax.  No lobar consolidation.    Cardiomediastinal silhouette is unchanged.                                 Medical Decision Making:   History:   I obtained history from: someone other than patient.       <> Summary of History: Hx obtained from daughter at bedside  Old Medical Records: I decided to obtain old medical records.  Old Records Summarized: records from clinic visits and records from previous admission(s).  Initial Assessment:   85yo F with extensive PMHx presents with progressive general weakness and fatigue x 2 weeks.  Differential Diagnosis:   DDx: age-related physical debility, infection (flu, UTI, PNA), anemia, malnutrition, electrolyte imbalance  Independently Interpreted Test(s):   I have ordered and independently interpreted X-rays - see prior notes.  I have ordered and independently interpreted EKG Reading(s) - see  summary below       <> Summary of EKG Reading(s): Sinus tachycardia rate 110.  PVC noted.  Baseline wander present.  No STEMI.  Clinical Tests:   Lab Tests: Ordered and Reviewed  Radiological Study: Reviewed and Ordered  Medical Tests: Ordered and Reviewed  ED Management:  CBC, CMP, lactate, CPK  EKG, CXR, UA    8:56 PM  CBC unremarkable  EKG with NSR and frequent PVCs. Patient hemodynamically stable.  UA concerning for UTI  - 1g IV ceftriaxone                Attending Attestation:   Physician Attestation Statement for Resident:  As the supervising MD   Physician Attestation Statement: I have personally seen and examined this patient.   I agree with the above history. -:   As the supervising MD I agree with the above PE.    As the supervising MD I agree with the above treatment, course, plan, and disposition.   -:     Tachycardic.  Afebrile.  Here with altered mental status.  EKG CXR, and labs obtained. Urinalysis consistent with UTI.  This is likely driving her fatigued and altered mental status.  Rocephin given in ED.  Clinically not overwhelming sepsis though.  Discussed with hospital medicine who admitted patient.      I have reviewed and agree with the residents interpretation of the following: lab data, EKG and x-rays.  I have reviewed the following: old records at this facility.                                  Clinical Impression:       ICD-10-CM ICD-9-CM   1. Acute cystitis without hematuria N30.00 595.0   2. Fatigue R53.83 780.79   3. SOB (shortness of breath) R06.02 786.05   4. Weakness R53.1 780.79   5. Chest pain R07.9 786.50             ED Disposition Condition    Admit                 Yamil Obrien MD  Resident  02/28/20 1825       Krnathi Khanna MD  02/28/20 1900

## 2020-02-28 NOTE — MEDICAL/APP STUDENT
[facility] Hospital Medicine                                                                     Progress Note     Team: [facility] HOSP MED MAYURI Arnulfo Cherry   Admit Date: 2/27/2020   Hospital Day: 1  Code status: Full Code   Principal Problem: Acute cystitis without hematuria       SUMMARY:     Selma Lux is a 84 y.o Female with PMHx of *** who presented to [facility] on 2/27/2020 for       SUBJECTIVE:     Pt was seen and examined at bedside. Pt had no acute events overnight, and no new complaints this morning. Pt remained hemodynamically stable and afebrile. Pt has been tolerating PO intake well without any nausea, vomiting, diarrhea, constipation, blood in stools or trouble urinating. Pt denies any fevers, chills, malaise, headaches, chest pain, SOB, cough.     ROS (Positive in Bold, otherwise negative)  Pain Scale: 0 /10   Constitutional: fever, chills, night sweats  CV: chest pain, edema, palpitations  Resp: SOB, cough, sputum production  GI: changes in appetite, NVDC, pain, melena, hematochezia, GERD, hematemesis  : Dysuria, hematuria, urinary urgency, frequency  MSK: arthralgia/myalgia, joint swelling  SKIN: rashes, pruritis, petechiae   Neuro/Psych, FNDm anxiety, depression      OBJECTIVE:     Vitals:  Temp:  [98.4 °F (36.9 °C)-99.1 °F (37.3 °C)]   Pulse:  [100-113]   Resp:  [17-25]   BP: (115-152)/(57-84)   SpO2:  [93 %-99 %]      I & O (Last 24H):   No intake or output data in the 24 hours ending 02/28/20 1141      GEN: {Race/ethnicity:[id number]} female  in no acute distress. Nontoxic. Resting in bed. Cooperative.  HEENT: NCAT. PERRL. EOMI. Conjunctivae/corneas clear, sclera Anicteric.  CVS: RRR. Normal s1 s2 no murmur, click, rub or gallop  LUNG: CTAB. Normal respiratory effort. No wheezes, rhonchi, or crackles.  ABD: Normoactive BS, soft, NT, ND, no masses or  organomegaly.  EXT: No edema. No cyanosis. Full ROM.  SKIN: color, texture, turgor normal. No rashes or lesions  NEURO: Alert, oriented x 4, Spont mvt of all extremities with no focal deficits noted.      All recent labs and imaging has been reviewed.     Recent Results (from the past 24 hour(s))   CBC auto differential    Collection Time: 02/27/20  6:08 PM   Result Value Ref Range    WBC 11.55 3.90 - 12.70 K/uL    RBC 4.60 4.00 - 5.40 M/uL    Hemoglobin 11.9 (L) 12.0 - 16.0 g/dL    Hematocrit 40.9 37.0 - 48.5 %    Mean Corpuscular Volume 89 82 - 98 fL    Mean Corpuscular Hemoglobin 25.9 (L) 27.0 - 31.0 pg    Mean Corpuscular Hemoglobin Conc 29.1 (L) 32.0 - 36.0 g/dL    RDW 15.6 (H) 11.5 - 14.5 %    Platelets 266 150 - 350 K/uL    MPV 10.4 9.2 - 12.9 fL    Immature Granulocytes 0.5 0.0 - 0.5 %    Gran # (ANC) 5.9 1.8 - 7.7 K/uL    Immature Grans (Abs) 0.06 (H) 0.00 - 0.04 K/uL    Lymph # 2.0 1.0 - 4.8 K/uL    Mono # 3.3 (H) 0.3 - 1.0 K/uL    Eos # 0.3 0.0 - 0.5 K/uL    Baso # 0.03 0.00 - 0.20 K/uL    nRBC 0 0 /100 WBC    Gran% 50.8 38.0 - 73.0 %    Lymph% 16.9 (L) 18.0 - 48.0 %    Mono% 28.6 (H) 4.0 - 15.0 %    Eosinophil% 2.9 0.0 - 8.0 %    Basophil% 0.3 0.0 - 1.9 %    Platelet Estimate Appears normal     Differential Method Automated    Urinalysis, Reflex to Urine Culture Urine, Clean Catch    Collection Time: 02/27/20  7:56 PM   Result Value Ref Range    Specimen UA Urine, Clean Catch     Color, UA Yellow Yellow, Straw, Sissy    Appearance, UA Hazy (A) Clear    pH, UA 5.0 5.0 - 8.0    Specific Gravity, UA 1.005 1.005 - 1.030    Protein, UA Negative Negative    Glucose, UA Negative Negative    Ketones, UA Negative Negative    Bilirubin (UA) Negative Negative    Occult Blood UA 1+ (A) Negative    Nitrite, UA Positive (A) Negative    Leukocytes, UA 3+ (A) Negative   Urinalysis Microscopic    Collection Time: 02/27/20  7:56 PM   Result Value Ref Range    RBC, UA 1 0 - 4 /hpf    WBC, UA 98 (H) 0 - 5 /hpf    WBC  Clumps, UA Rare None-Rare    Bacteria Many (A) None-Occ /hpf    Squam Epithel, UA 1 /hpf    Microscopic Comment SEE COMMENT    CPK    Collection Time: 02/28/20 12:05 AM   Result Value Ref Range    CPK 93 20 - 180 U/L   Lactic acid, plasma    Collection Time: 02/28/20 12:05 AM   Result Value Ref Range    Lactate (Lactic Acid) 1.1 0.5 - 2.2 mmol/L   Comprehensive metabolic panel    Collection Time: 02/28/20 12:05 AM   Result Value Ref Range    Sodium 139 136 - 145 mmol/L    Potassium 4.3 3.5 - 5.1 mmol/L    Chloride 103 95 - 110 mmol/L    CO2 24 23 - 29 mmol/L    Glucose 64 (L) 70 - 110 mg/dL    BUN, Bld 15 8 - 23 mg/dL    Creatinine 1.3 0.5 - 1.4 mg/dL    Calcium 9.1 8.7 - 10.5 mg/dL    Total Protein 6.8 6.0 - 8.4 g/dL    Albumin 3.3 (L) 3.5 - 5.2 g/dL    Total Bilirubin 1.0 0.1 - 1.0 mg/dL    Alkaline Phosphatase 76 55 - 135 U/L    AST 22 10 - 40 U/L    ALT 10 10 - 44 U/L    Anion Gap 12 8 - 16 mmol/L    eGFR if African American 44.1 (A) >60 mL/min/1.73 m^2    eGFR if non  38.3 (A) >60 mL/min/1.73 m^2   TSH    Collection Time: 02/28/20 12:05 AM   Result Value Ref Range    TSH 0.395 (L) 0.400 - 4.000 uIU/mL   T4, free    Collection Time: 02/28/20 12:05 AM   Result Value Ref Range    Free T4 1.43 0.71 - 1.51 ng/dL   POCT glucose    Collection Time: 02/28/20  8:56 AM   Result Value Ref Range    POCT Glucose 45 (LL) 70 - 110 mg/dL   POCT glucose    Collection Time: 02/28/20  8:57 AM   Result Value Ref Range    POCT Glucose 48 (LL) 70 - 110 mg/dL   Basic Metabolic Panel (BMP)    Collection Time: 02/28/20  9:26 AM   Result Value Ref Range    Sodium 139 136 - 145 mmol/L    Potassium 3.5 3.5 - 5.1 mmol/L    Chloride 103 95 - 110 mmol/L    CO2 22 (L) 23 - 29 mmol/L    Glucose 67 (L) 70 - 110 mg/dL    BUN, Bld 14 8 - 23 mg/dL    Creatinine 1.1 0.5 - 1.4 mg/dL    Calcium 9.4 8.7 - 10.5 mg/dL    Anion Gap 14 8 - 16 mmol/L    eGFR if African American 54.0 (A) >60 mL/min/1.73 m^2    eGFR if non African American  46.9 (A) >60 mL/min/1.73 m^2   Magnesium    Collection Time: 02/28/20  9:26 AM   Result Value Ref Range    Magnesium 1.7 1.6 - 2.6 mg/dL   Phosphorus    Collection Time: 02/28/20  9:26 AM   Result Value Ref Range    Phosphorus 4.0 2.7 - 4.5 mg/dL   CBC with Automated Differential    Collection Time: 02/28/20  9:26 AM   Result Value Ref Range    WBC 10.27 3.90 - 12.70 K/uL    RBC 4.75 4.00 - 5.40 M/uL    Hemoglobin 12.4 12.0 - 16.0 g/dL    Hematocrit 41.5 37.0 - 48.5 %    Mean Corpuscular Volume 87 82 - 98 fL    Mean Corpuscular Hemoglobin 26.1 (L) 27.0 - 31.0 pg    Mean Corpuscular Hemoglobin Conc 29.9 (L) 32.0 - 36.0 g/dL    RDW 15.5 (H) 11.5 - 14.5 %    Platelets 267 150 - 350 K/uL    MPV 10.3 9.2 - 12.9 fL    Immature Granulocytes 0.5 0.0 - 0.5 %    Gran # (ANC) 5.1 1.8 - 7.7 K/uL    Immature Grans (Abs) 0.05 (H) 0.00 - 0.04 K/uL    Lymph # 1.9 1.0 - 4.8 K/uL    Mono # 2.8 (H) 0.3 - 1.0 K/uL    Eos # 0.4 0.0 - 0.5 K/uL    Baso # 0.03 0.00 - 0.20 K/uL    nRBC 0 0 /100 WBC    Gran% 49.6 38.0 - 73.0 %    Lymph% 18.5 18.0 - 48.0 %    Mono% 27.6 (H) 4.0 - 15.0 %    Eosinophil% 3.5 0.0 - 8.0 %    Basophil% 0.3 0.0 - 1.9 %    Platelet Estimate Appears normal     Aniso Slight     Poik Slight     Poly Occasional     Ovalocytes Occasional     Maria Eugenia Cells Occasional     Differential Method Automated    POCT glucose    Collection Time: 02/28/20  9:30 AM   Result Value Ref Range    POCT Glucose 63 (L) 70 - 110 mg/dL   POCT glucose    Collection Time: 02/28/20 11:06 AM   Result Value Ref Range    POCT Glucose 110 70 - 110 mg/dL       Recent Labs   Lab 02/28/20  0856 02/28/20  0857 02/28/20  0930 02/28/20  1106   POCTGLUCOSE 45* 48* 63* 110       Hemoglobin A1C   Date Value Ref Range Status   08/27/2019 5.6 4.0 - 5.6 % Final     Comment:     ADA Screening Guidelines:  5.7-6.4%  Consistent with prediabetes  >or=6.5%  Consistent with diabetes  High levels of fetal hemoglobin interfere with the HbA1C  assay. Heterozygous hemoglobin  variants (HbS, HgC, etc)do  not significantly interfere with this assay.   However, presence of multiple variants may affect accuracy.     03/13/2019 5.7 (H) 4.0 - 5.6 % Final     Comment:     ADA Screening Guidelines:  5.7-6.4%  Consistent with prediabetes  >or=6.5%  Consistent with diabetes  High levels of fetal hemoglobin interfere with the HbA1C  assay. Heterozygous hemoglobin variants (HbS, HgC, etc)do  not significantly interfere with this assay.   However, presence of multiple variants may affect accuracy.     08/24/2017 5.5 4.0 - 5.6 % Final     Comment:     According to ADA guidelines, hemoglobin A1c <7.0% represents  optimal control in non-pregnant diabetic patients. Different  metrics may apply to specific patient populations.   Standards of Medical Care in Diabetes-2016.  For the purpose of screening for the presence of diabetes:  <5.7%     Consistent with the absence of diabetes  5.7-6.4%  Consistent with increasing risk for diabetes   (prediabetes)  >or=6.5%  Consistent with diabetes  Currently, no consensus exists for use of hemoglobin A1c  for diagnosis of diabetes for children.  This Hemoglobin A1c assay has significant interference with fetal   hemoglobin   (HbF). The results are invalid for patients with abnormal amounts of   HbF,   including those with known Hereditary Persistence   of Fetal Hemoglobin. Heterozygous hemoglobin variants (HbAS, HbAC,   HbAD, HbAE, HbA2) do not significantly interfere with this assay;   however, presence of multiple variants in a sample may impact the %   interference.          Active Hospital Problems    Diagnosis  POA    *Acute cystitis without hematuria [N30.00]  Yes    Arthritis [M19.90]  Yes    Hyperlipidemia [E78.5]  Yes    AF (paroxysmal atrial fibrillation) [I48.0]  Yes    Generalized weakness [R53.1]  Yes    Hypertension, essential [I10]  Yes    Iron deficiency anemia secondary to inadequate dietary iron intake [D50.8]  Yes      Resolved Hospital  Problems   No resolved problems to display.          ASSESSMENT AND PLAN:           Prophylaxis- Lovenox    Code Status- Full Code     Discharge plan and follow up - d/c home once medically stable    Arnulfo Cherry  Medical Student

## 2020-02-28 NOTE — PLAN OF CARE
Problem: Occupational Therapy Goal  Goal: Occupational Therapy Goal  Description  Goals to be met by: 3/8/20    Patient will increase functional independence with ADLs by performing:    Feeding with Set-up Assistance.  UE Dressing with Minimal Assistance.  LE Dressing with Minimal Assistance.  Grooming while standing at bedside with Minimal Assistance with AD as needed to prepare for task in standing at sink.  Toileting from bedside commode with Minimal Assistance for hygiene and clothing management.   Stand pivot transfers with Minimal Assistance to reach stable surface.  Toilet transfer to bedside commode with Minimal Assistance.     Outcome: Ongoing, Progressing     OT evaluation complete and POC established. See evaluation note for further details.   Disposition recommendation: Skilled nursing facility    Cora Oliver OTR/L  2/28/20

## 2020-02-28 NOTE — NURSING
Pt hypoglyemic, 48 at morning check, D10 bolus given BG increased to 68. MD ordered D5 continuous. However Pt lost IV access and after 2 attempts from myself and another nurse we were unable to obtain an IV. Fluids on hold for now.  Consult to midline placed. Most recent accucheck 110. WCTM.

## 2020-02-28 NOTE — PT/OT/SLP EVAL
Occupational Therapy   Evaluation/Treatment     Name: Selma Lux  MRN: [id number]  Admitting Diagnosis:  Acute cystitis without hematuria      Recommendations:     Discharge Recommendations: nursing facility, skilled  Discharge Equipment Recommendations:  (will determine closer to discharge)  Barriers to discharge:  Decreased caregiver support(daughter works during the day 3x/wk; pt a high fall risk)    Assessment:     Selma Lux is a 84 y.o. female with a medical diagnosis of Acute cystitis without hematuria.  She presents with the following performance deficits affecting function: weakness, impaired endurance, impaired self care skills, decreased coordination, impaired balance, impaired fine motor.  Pt required maximal verbal and tactile cuing to initiate and remain awake during session. Pt appeared lethargic presenting with generalized weakness affecting her ADL performance and participation during functional tasks. Due to pt's decreased level of arousal, transfers and mobility deferred this date. Pt alert and oriented throughout the session despite increased lethargy. Due to pt's decreased caregiver support, history of falls, and increased assistance for functional tasks compared to baseline, OT recommending skilled nursing facility post acute to reduce caregiver burden and assist pt into returning to her normal roles and routines. While in house, pt will benefit from skilled OT 3x/wk to prevent further debility and regain strength and endurance for functional tasks.     Rehab Prognosis: Good; patient would benefit from acute skilled OT services to address these deficits and reach maximum level of function.       Plan:     Patient to be seen 4 x/week to address the above listed problems via self-care/home management, therapeutic activities, therapeutic exercises, neuromuscular re-education  · Plan of Care Expires: 03/28/20  · Plan of Care Reviewed with: patient    Subjective     Chief Complaint:  ""I'm just so weak."  Patient/Family Comments/goals: Pt agreeable to OT/PT evaluation and OT POC. "It was a delight, I hope you all come visit me sometime soon."    Occupational Profile:  Living Environment: Pt lives with daughter in a STH with 5 GLORIA and bilateral handrails. Bathroom set up: Step in tub   Previous level of function: For the last week, pt required assistance for ADLs and has been limited with mobility due to onset of weakness. Prior to that, pt I in all ADLs/IADLs; mod I for mobility with use of rolling walker.   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 Pt's daughter works during the day 3x/wk    Pain/Comfort:  · Pain Rating 1: 0/10  · Pain Rating Post-Intervention 1: (reported of LLE pain upon reaching EOB; reported of abdominal discomfort in supine after ax; pt did not rate)  · Pain Addressed 2: Reposition, Cessation of Activity    Patients cultural, spiritual, Jewish conflicts given the current situation: no    Objective:     Communicated with: RNprior to session.  Patient found HOB elevated with peripheral IV, telemetry upon OT entry to room.    General Precautions: Standard, fall   Orthopedic Precautions:N/A   Braces: N/A     Occupational Performance:    Bed Mobility:    · Patient completed Rolling/Turning to Left with  maximal assistance and with side rail  · Patient completed Scooting/Bridging with maximal assistance for anterior hip placement  · Patient completed Supine to Sit with maximal assistance for BLE placement over bed and trunk support to reach upright sitting   · Patient completed Sit to Supine with maximal assistance for BLE placement and trunk guidance     Functional Mobility/Transfers:  · NT 2/2 pt's decreased level of arousal and fatigue    Activities of " Daily Living:  · Grooming: set up assistance for facial hygiene seated EOB; Mod A for sitting balance durign task with L lateral lean present   · Upper Body Dressing: minimum assistance to don gown as jacket due to limited BUE ROM due to weakness and line management  · Lower Body Dressing: maximal assistance to doff BLE socks EOB    Cognitive/Visual Perceptual:  Cognitive/Psychosocial Skills:     -       Oriented to: Person, Place, Time and Situation   -       Follows Commands/attention:Follows one-step commands  -       Communication: clear/fluent  -       Memory: No Deficits noted  -       Safety awareness/insight to disability: intact   -       Mood/Affect/Coping skills/emotional control: Lethargic  Visual/Perceptual:      -Intact no defs noted    Physical Exam:  Balance:    -       Sitting EOB: SBA- mod A with L lateral and posterior lean   Postural examination/scapula alignment:    -       Rounded shoulders  -       Forward head  Skin integrity: Dry  Edema:  Mild BLEs  Sensation:    -       Intact  Upper Extremity Range of Motion:    -       Right Upper Extremity: Deficits: WFL with active assist  -       Left Upper Extremity: WFL with active assist  Upper Extremity Strength:    -       Right Upper Extremity: 3/5  -       Left Upper Extremity: 3/5   Strength:    -       Right Upper Extremity: WFL  -       Left Upper Extremity: WFL  Fine Motor Coordination:    -       Intact  Left hand, finger to nose and Right hand, finger to nose    AMPAC 6 Click ADL:  AMPAC Total Score: 12    Treatment & Education:  - Role of OT/ OT POC  - Self care safety/ independence  - Bed mobility safety  - Importance of having bed to at least 30 degrees to drink and eat to prevent aspiration  - Pt tolerated sitting EOB with SBA-Mod A throughout evaluation to promote upright sitting, improve awareness and participation during functional tasks. Pt presented with a L lateral and posterior lean requiring mod A for facilitation to  midline. Pt responded well to tactile and physical cuing for posture correction.   - Spoke with RN about pt's functional performance this date and about placing pure wick as pt is not safe to transfer to bedside commode at this time.   Education:    Patient left HOB elevated to 15 degrees with all lines intact, call button in reach, bed alarm on and RN notified    GOALS:   Multidisciplinary Problems     Occupational Therapy Goals        Problem: Occupational Therapy Goal    Goal Priority Disciplines Outcome Interventions   Occupational Therapy Goal     OT, PT/OT Ongoing, Progressing    Description:  Goals to be met by: 3/8/20    Patient will increase functional independence with ADLs by performing:    Feeding with Set-up Assistance.  UE Dressing with Minimal Assistance.  LE Dressing with Minimal Assistance.  Grooming while standing at bedside with Minimal Assistance with AD as needed to prepare for task in standing at sink.  Toileting from bedside commode with Minimal Assistance for hygiene and clothing management.   Stand pivot transfers with Minimal Assistance to reach stable surface.  Toilet transfer to bedside commode with Minimal Assistance.                      History:     Past Medical History:   Diagnosis Date    Acute hypoxemic respiratory failure 4/11/2018    Altered mental status     Anemia     Arthritis     Bilateral hand pain 3/14/2018    Branch retinal vein occlusion, left eye 2/20/2015    Chronic bilateral low back pain without sciatica 3/23/2017    Chronic renal failure in pediatric patient, stage 3 (moderate) 4/15/2018    Cognitive communication deficit 12/19/2017    Cystoid macular edema, left eye 2/20/2015    Cystoid macular edema, left eye 2/20/2015    DJD (degenerative joint disease) of cervical spine 5/15/2013    Fatty liver 8/26/2014    Goiter 4/9/2018    Hashimoto's disease     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    Hypertension     Hypertensive encephalopathy     IBS (irritable bowel  syndrome) 6/21/2017    IGT (impaired glucose tolerance) 1/12/2016    Iron deficiency anemia secondary to inadequate dietary iron intake 6/24/2013    Joint pain     Keratoconjunctivitis sicca of both eyes not specified as Sjogren's 7/29/2016    Leiomyoma of uterus, unspecified 9/16/2014    Long QT interval 6/29/2016    Due to medication (plaquenil)     Macular edema 1/12/2015    Multinodular goiter 1/12/2016    Neuropathy 8/23/2017    Plaquenil causing adverse effect in therapeutic use 10/7/2016    Pneumococcal vaccine refused 8/17/2016    Pneumonia due to Streptococcus pneumoniae 4/5/2018    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/2015    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    s/p Right Total knee replacement 5/15/2013    Scoliosis of thoracic spine 5/15/2013    Small vessel disease, cerebrovascular 12/28/2017    Stroke     UTI (urinary tract infection) 12/29/2018    Vascular dementia 12/6/2017       Past Surgical History:   Procedure Laterality Date    BREAST CYST EXCISION      CATARACT EXTRACTION      COLONOSCOPY N/A 9/29/2015    Procedure: COLONOSCOPY;  Surgeon: FIDELINA Sanchez MD;  Location: [facility] ([address] [place] [address]);  Service: Endoscopy;  Laterality: N/A;    EYE SURGERY      JOINT REPLACEMENT      right knee    KNEE SURGERY Left 12/31/2013    TKR    left parotidectomy      mixed tumor    SALIVARY GLAND SURGERY      TONSILLECTOMY         Time Tracking:     OT Date of Treatment: 02/28/20  OT Start Time: 1031  OT Stop Time: 1050  OT Total Time (min): 19 min Co eval with PT     Billable Minutes:Evaluation 10  Neuromuscular Re-education 9    Cora Oliver, OT  2/28/2020

## 2020-02-28 NOTE — ED NOTES
"Pt states, "my daughter thinks i'm running a little late. A little slow sometimes. I guess at this point I don't have a lot of interest in some things." pt is AAO x 2 as evidenced by correctly stating name and year but is unsure of where she is and unable to answer some questions. Pt denies any complaints at this time. No acute distress noted. Stable condition.    "

## 2020-02-28 NOTE — H&P
Hospital Medicine  History and Physical Exam       Team: Networked reference to record PCT  Troy Chaudhary MD  Admit Date: 2/27/2020  Principal Problem:  Acute cystitis without hematuria   Patient information was obtained from patient, relative(s) and ER records.   Primary care Physician: Bhargav Hirsch MD  Code status: Full Code    HPI:   Selma Lux is a/an 84 y.o. female with COPD, arthritis on plaquenil and cellcept, HTN, HPLD, TIA hx presenting for generalized weakness and occasional falls for the past 2 weeks. 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 She states that her PT was commenting on how much weaker she had gotten as well. The patient does complain of not being able to do the things she would like to anymore and that she was probably going to see a psychiatrist soon. Patient is on valium, gabapentin, and baclofen. Per patients daughter she only takes valium and higher dose of gabapentin at night for sleep. Baclofen is only taken once a day now, per her PCP the last time she saw her in the clinic. In ED, UA concerning for UTI, medicine called for admission. CXR with infiltrates      Past Medical History: Patient has a past medical history of Acute hypoxemic respiratory failure (4/11/2018), Altered mental status, Anemia, Arthritis, Bilateral hand pain (3/14/2018), Branch retinal vein occlusion, left eye (2/20/2015), Chronic bilateral low back pain without sciatica (3/23/2017), Chronic renal failure in pediatric patient, stage 3 (moderate) (4/15/2018), Cognitive communication deficit (12/19/2017), Cystoid macular edema, left eye (2/20/2015), Cystoid macular edema, left eye (2/20/2015), DJD (degenerative joint disease) of cervical spine (5/15/2013), Fatty liver (8/26/2014), Goiter (4/9/2018), Hashimoto's disease, Hemichorea (8/23/2017),  Hypertension, Hypertensive encephalopathy, IBS (irritable bowel syndrome) (6/21/2017), IGT (impaired glucose tolerance) (1/12/2016), Iron deficiency anemia secondary to inadequate dietary iron intake (6/24/2013), Joint pain, Keratoconjunctivitis sicca of both eyes not specified as Sjogren's (7/29/2016), Leiomyoma of uterus, unspecified (9/16/2014), Long QT interval (6/29/2016), Macular edema (1/12/2015), Multinodular goiter (1/12/2016), Neuropathy (8/23/2017), Plaquenil causing adverse effect in therapeutic use (10/7/2016), Pneumococcal vaccine refused (8/17/2016), Pneumonia due to Streptococcus pneumoniae (4/5/2018), Primary osteoarthritis involving multiple joints (10/21/2015), 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    Past Surgical History: Patient has a past surgical history that includes Salivary gland surgery; Tonsillectomy; left parotidectomy; Cataract extraction; Colonoscopy (N/A, 9/29/2015); Joint replacement; Knee surgery (Left, 12/31/2013); Eye surgery; and Breast cyst excision.    Social History: Patient reports that she has never smoked. She has never used smokeless tobacco. She reports that she drinks alcohol. She reports that she does not use drugs.    Family History: family history includes Breast cancer in her maternal grandmother; Cancer in her father; Heart disease in her mother; Hypertension in her mother; Hyperthyroidism in her mother and other; Prostate cancer in her father.    Medications: reviewed     Allergies: Patient has No Known Allergies.    ROS  14 point ROS otherwise negative    PEx  Temp:  [98.4 °F (36.9 °C)-99.1 °F (37.3 °C)]   Pulse:  [100-113]   Resp:  [20-25]   BP: (117-149)/(57-71)   SpO2:  [93 %-99 %]   Body mass index is 34.67 kg/m².   No intake or  output data in the 24 hours ending 02/27/20 9495    General appearance: no distress, lying in bed  Mental status: Alert and oriented to place and self  HEENT:  conjunctivae/corneas clear, PERRL  Neck: supple, thyroid not enlarged  Pulm:   normal respiratory effort, CTA B, no c/w/r  Card: tachycardic, S1, S2 normal, no murmur, click, rub or gallop  Abd: soft, NT, ND, BS present; no masses, no organomegaly  Ext: no c/c/e  Pulses: 2+, symmetric  Skin: color, texture, turgor normal. No rashes or lesions  Neuro: CN II-XII grossly intact, mild weakness in lower extremities, but possibly 2/2 poor effort    Recent Results (from the past 24 hour(s))   CBC auto differential    Collection Time: 02/27/20  6:08 PM   Result Value Ref Range    WBC 11.55 3.90 - 12.70 K/uL    RBC 4.60 4.00 - 5.40 M/uL    Hemoglobin 11.9 (L) 12.0 - 16.0 g/dL    Hematocrit 40.9 37.0 - 48.5 %    Mean Corpuscular Volume 89 82 - 98 fL    Mean Corpuscular Hemoglobin 25.9 (L) 27.0 - 31.0 pg    Mean Corpuscular Hemoglobin Conc 29.1 (L) 32.0 - 36.0 g/dL    RDW 15.6 (H) 11.5 - 14.5 %    Platelets 266 150 - 350 K/uL    MPV 10.4 9.2 - 12.9 fL    Immature Granulocytes 0.5 0.0 - 0.5 %    Gran # (ANC) 5.9 1.8 - 7.7 K/uL    Immature Grans (Abs) 0.06 (H) 0.00 - 0.04 K/uL    Lymph # 2.0 1.0 - 4.8 K/uL    Mono # 3.3 (H) 0.3 - 1.0 K/uL    Eos # 0.3 0.0 - 0.5 K/uL    Baso # 0.03 0.00 - 0.20 K/uL    nRBC 0 0 /100 WBC    Gran% 50.8 38.0 - 73.0 %    Lymph% 16.9 (L) 18.0 - 48.0 %    Mono% 28.6 (H) 4.0 - 15.0 %    Eosinophil% 2.9 0.0 - 8.0 %    Basophil% 0.3 0.0 - 1.9 %    Platelet Estimate Appears normal     Differential Method Automated    Urinalysis, Reflex to Urine Culture Urine, Clean Catch    Collection Time: 02/27/20  7:56 PM   Result Value Ref Range    Specimen UA Urine, Clean Catch     Color, UA Yellow Yellow, Straw, Sissy    Appearance, UA Hazy (A) Clear    pH, UA 5.0 5.0 - 8.0    Specific Gravity, UA 1.005 1.005 - 1.030    Protein, UA Negative Negative    Glucose,  UA Negative Negative    Ketones, UA Negative Negative    Bilirubin (UA) Negative Negative    Occult Blood UA 1+ (A) Negative    Nitrite, UA Positive (A) Negative    Leukocytes, UA 3+ (A) Negative   Urinalysis Microscopic    Collection Time: 02/27/20  7:56 PM   Result Value Ref Range    RBC, UA 1 0 - 4 /hpf    WBC, UA 98 (H) 0 - 5 /hpf    WBC Clumps, UA Rare None-Rare    Bacteria Many (A) None-Occ /hpf    Squam Epithel, UA 1 /hpf    Microscopic Comment SEE COMMENT        Active Hospital Problems    Diagnosis  POA    *Acute cystitis without hematuria [N30.00]  Yes    Arthritis [M19.90]  Yes    Hyperlipidemia [E78.5]  Yes    AF (paroxysmal atrial fibrillation) [I48.0]  Yes    Generalized weakness [R53.1]  Yes    Hypertension, essential [I10]  Yes    Iron deficiency anemia secondary to inadequate dietary iron intake [D50.8]  Yes      Resolved Hospital Problems   No resolved problems to display.       Assessment and Plan:  Acute cystitis  - symptoms concerning for UTI with altered mentation  - UA positive for bacteria and WBC, leuk and nitrates  - given fluids and rocephin  - will follow up urine cx    Pneumonia  - CXR with infiltrates  - will start azithro with rocephin  - continue albuterol as well    Acute encephalopathy/Weakness  - since duration of cystitis per family member  - suspect aspect of dehydration and deconditioning  - delirium and fall precautions  - is on valium and gabapentin, but only takes at night per family and no more than usual, and have actually scaled back on baclofen usage  - will order PT/OT for eval    HTN- continue amlodipine  RA- hold cellcept for now, continue other medications  HPLD- continue statin      DVT PPx: lovenox    Troy Chaudhary MD  Hospital Medicine Staff  02/27/2020

## 2020-02-28 NOTE — PLAN OF CARE
Discharge Recommendation: SNF.    Eval completed today. PT goals appropriate.    Penny Hernandez, PT, DPT  2020  Pager: [phone]        Problem: Physical Therapy Goal  Goal: Physical Therapy Goal  Description  Goals to be met by: 3/13/2020     Patient will increase functional independence with mobility by performin. Supine to sit with Contact Guard Assistance  2. Sit to supine with Contact Guard Assistance  3. Sit to stand transfer with Minimal Assistance using Rolling Walker or LRAD  4. Bed to chair transfer with Minimal Assistance using Rolling Walker or LRAD  5. Gait  x 25 feet with Contact Guard Assistance using Rolling Walker.   6. Sitting at edge of bed x10 minutes with Stand-by Assistance  7. Lower extremity exercise program x20 reps per handout, with independence     Outcome: Ongoing, Progressing

## 2020-02-28 NOTE — PLAN OF CARE
CM met with patient and family member/friend to obtain discharge planning assessment.  Patient admitted with acute cystitis.  Planned discharge is home with family with home health - Plan (A) or Skilled Nursing - Plan (B).    PCP:  Bhargav Hirsch MD     Payor:  Payor: MEDICARE / Plan: MEDICARE PART A & B / Product Type: Government /        Pharmacy:    [facility] - [place], [place] - [address]  [address] [address]  Phone: [phone] Fax: [phone]    [id number] [place], [place] - [address] [facility]  [address] [facility] [address]  Phone: [phone] Fax: [phone]       02/28/20 1358   Discharge Assessment   Assessment Type Discharge Planning Assessment   Confirmed/corrected address and phone number on facesheet? Yes   Assessment information obtained from? Patient   Expected Length of Stay (days) 3   Communicated expected length of stay with patient/caregiver yes   Prior to hospitilization cognitive status: Alert/Oriented   Prior to hospitalization functional status: Assistive Equipment   Current cognitive status: Alert/Oriented   Current Functional Status: Assistive Equipment   Lives With child(yash), adult   Able to Return to Prior Arrangements other (see comments)  (tbd)   Is patient able to care for self after discharge? Unable to determine at this time (comments)   Who are your caregiver(s) and their phone number(s)? Rosy Rosado - daughter [phone]   Patient's perception of discharge disposition home health;home or selfcare   Readmission Within the Last 30 Days no previous admission in last 30 days   Patient currently being followed by outpatient case management? No   Patient currently receives any other outside agency services? No   Equipment Currently Used at Home walker, rolling;bedside commode   Do you have any problems affording any of your prescribed medications? No   Is the  patient taking medications as prescribed? yes   Does the patient have transportation home? Yes   Transportation Anticipated family or friend will provide   Does the patient receive services at the [facility]? No   Discharge Plan A Home with family;Home Health   Discharge Plan B Skilled Nursing Facility   DME Needed Upon Discharge  none   Patient/Family in Agreement with Plan yes

## 2020-02-28 NOTE — PT/OT/SLP EVAL
Physical Therapy Evaluation    Patient Name:  Selma Lux   MRN:  [id number]  Admit Date: 2/27/2020  Admitting Diagnosis:  Acute cystitis without hematuria   Length of Stay: 1 days  Recent Surgery: * No surgery found *      Recommendations:     Discharge Recommendations:  nursing facility, skilled   Discharge Equipment Recommendations: other (see comments)(tbd pending progress)   Barriers to discharge: Decreased caregiver support - pt home alone during the day    Assessment:     Selma Lux is a 84 y.o. female admitted with a medical diagnosis of Acute cystitis without hematuria.  She presents with the following impairments/functional limitations: weakness, impaired endurance, impaired functional mobilty, gait instability, impaired self care skills, decreased lower extremity function, decreased upper extremity function, impaired fine motor, impaired coordination, pain. Pt tolerated session fairly today. Pt demonstrated decreased alertness while supine and unable to keep eyes open during conversation with PT. Pt was able to tolerate time EOB x 10 minutes and demonstrated ability to keep eyes open and hold a conversation throughout time. Pt is demonstrating overall deconditioning and is functioning well below PLOF, as seen by assist levels required for all aspects of mobility on this date. Pt will benefit from SNF after discharge from acute services in order to continue to progress pt's strength, endurance, and balance to help pt maximize independence at or near PLOF.    Rehab Prognosis: Good; patient would benefit from acute skilled PT services to address these deficits and reach maximum level of function.      Plan:     During this hospitalization, patient to be seen 4 x/week to address the identified rehab impairments via gait training, therapeutic activities, therapeutic exercises, neuromuscular re-education and progress towards the established goals.    · Plan of Care Expires:   "03/28/20    Subjective     RN notified prior to session. No family/friends present upon PT entrance into room.    Chief Complaint: "I'm so weak"  Patient/Family Comments/goals: get stronger  Pain/Comfort:  · Pain Rating 1: 0/10  · Pain Rating Post-Intervention 1: 0/10    Living Environment:  Patient lives with daughter in a single family home with 5 GLORIA. Pt has a step in tub.  Prior Level of Function: Patient reports being independent with mobility and ADLs, but requiring a walker over the last two weeks as well as assist with ADLs. Patient uses DME as follows: walker, rolling, bedside commode. DME owned (not currently used): none. 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 Drive: no. Managing Medicines/Managing Home: no.     Patient reports they will have assistance from daughter (works during the day) upon discharge.    Objective:     Additional staff present: OT for co-evaluation    Patient found HOB elevated with: telemetry, peripheral IV     General Precautions: Standard, Cardiac fall   Orthopedic Precautions:N/A   Braces: N/A   Body mass index is 32.1 kg/m².  Oxygen Device: Room Air    Exams:  · Mental Status: Patient is AxOx4 and follows all single-step verbal commands. Pt is Alert, Lethargic and Cooperative during session.  · Skin Integrity: Visible skin intact  · Edema: Mild BLE  · Sensation: Intact  · Hearing: Intact  · Vision:  Intact  · Postural Assessment: slouched posture, rounded shoulders, poor midline awareness, trunk deviated left, posterior pelvic tilt and posterior trunk lean  · Range of Motion:  · RUE: WFL  · LUE: WFL  · RLE: WFL  · LLE: WFL  · Strength Exam:  · Upper Extremity Strength: grossly 3/5  · Lower Extremity Strength  Right LE  Left LE    Knee extension: 3-/5 Knee extension: 3-/5   Knee flexion: 3/5 Knee flexion: 3/5   Hip flexion: 3-/5 Hip flexion: 3-/5   Ankle dorsiflexion:   3+/5 Ankle dorsiflexion:   3+/5 "   Ankle plantarflexion: 3+/5 Ankle plantarflexion: 3+/5       Outcome Measures:  AM-PAC 6 CLICK MOBILITY  Turning over in bed (including adjusting bedclothes, sheets and blankets)?: 2  Sitting down on and standing up from a chair with arms (e.g., wheelchair, bedside commode, etc.): 2  Moving from lying on back to sitting on the side of the bed?: 2  Moving to and from a bed to a chair (including a wheelchair)?: 2  Need to walk in hospital room?: 2  Climbing 3-5 steps with a railing?: 2  Basic Mobility Total Score: 12     Functional Mobility:    Bed Mobility:   · Rolling/Turning to Left: maximal assistance and with side rail  · Scooting to HOB via supine bridge: total assistance and with drawsheet  · Supine to Sit: maximal assistance, with side rail and with HOB elevated; from Lt side of bed  · Scooting anteriorly to EOB to have both feet planted on floor: maximal assistance  · Sit to Supine: maximal assistance; to Lt side of bed  · Assist with BLE as well as guiding trunk    Sitting Balance at Edge of Bed:   Assistance Level Required: Moderate Assistance   Time: 10 minutes   Postural deviations noted: slouched posture, rounded shoulders, forward head, poor midline awareness, trunk deviated left, posterior pelvic tilt and posterior trunk lean   Comments: Pt grossly required Mod A to maintain midline posture and did not demonstrate the strength or endurance to maintain seated upright posture. Pt was able to perform activities with BUE, including dressing and self-care while seated EOB with OT, but demonstrated a consistent posterior/left trunk lean.    Transfers/Gait: not tested 2/2 pt request due to fatigue       Education:   Time provided for education, counseling and discussion of health disposition in regards to patient's current status   All questions answered within PT scope of practice and to patient's satisfaction   PT role in POC to address current functional deficits   Pt educated on proper body  mechanics, safety techniques, and energy conservation with PT facilitation and cueing throughout session   Whiteboard updated with pt's current mobility status documented above    Patient left HOB elevated with all lines intact, call button in reach, bed alarm on and RN notified.    GOALS:   Multidisciplinary Problems     Physical Therapy Goals        Problem: Physical Therapy Goal    Goal Priority Disciplines Outcome Goal Variances Interventions   Physical Therapy Goal     PT, PT/OT Ongoing, Progressing     Description:  Goals to be met by: 3/13/2020     Patient will increase functional independence with mobility by performin. Supine to sit with Contact Guard Assistance  2. Sit to supine with Contact Guard Assistance  3. Sit to stand transfer with Minimal Assistance using Rolling Walker or LRAD  4. Bed to chair transfer with Minimal Assistance using Rolling Walker or LRAD  5. Gait  x 25 feet with Contact Guard Assistance using Rolling Walker.   6. Sitting at edge of bed x10 minutes with Stand-by Assistance  7. Lower extremity exercise program x20 reps per handout, with independence                      History:     Past Medical History:   Diagnosis Date    Acute hypoxemic respiratory failure 2018    Altered mental status     Anemia     Arthritis     Bilateral hand pain 3/14/2018    Branch retinal vein occlusion, left eye 2015    Chronic bilateral low back pain without sciatica 3/23/2017    Chronic renal failure in pediatric patient, stage 3 (moderate) 4/15/2018    Cognitive communication deficit 2017    Cystoid macular edema, left eye 2015    Cystoid macular edema, left eye 2015    DJD (degenerative joint disease) of cervical spine 5/15/2013    Fatty liver 2014    Goiter 2018    Hashimoto's disease     Hemichorea 2017    Hypertension     Hypertensive encephalopathy     IBS (irritable bowel syndrome) 2017    IGT (impaired glucose tolerance)  1/12/2016    Iron deficiency anemia secondary to inadequate dietary iron intake 6/24/2013    Joint pain     Keratoconjunctivitis sicca of both eyes not specified as Sjogren's 7/29/2016    Leiomyoma of uterus, unspecified 9/16/2014    Long QT interval 6/29/2016    Due to medication (plaquenil)     Macular edema 1/12/2015    Multinodular goiter 1/12/2016    Neuropathy 8/23/2017    Plaquenil causing adverse effect in therapeutic use 10/7/2016    Pneumococcal vaccine refused 8/17/2016    Pneumonia due to Streptococcus pneumoniae 4/5/2018    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/2015    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    s/p Right Total knee replacement 5/15/2013    Scoliosis of thoracic spine 5/15/2013    Small vessel disease, cerebrovascular 12/28/2017    Stroke     UTI (urinary tract infection) 12/29/2018    Vascular dementia 12/6/2017       Past Surgical History:   Procedure Laterality Date    BREAST CYST EXCISION      CATARACT EXTRACTION      COLONOSCOPY N/A 9/29/2015    Procedure: COLONOSCOPY;  Surgeon: FIDELINA Sanchez MD;  Location: [address];  Service: Endoscopy;  Laterality: N/A;    EYE SURGERY      JOINT REPLACEMENT      right knee    KNEE SURGERY Left 12/31/2013    TKR    left parotidectomy      mixed tumor    SALIVARY GLAND SURGERY      TONSILLECTOMY         Time Tracking:     PT Received On: 02/28/20  PT Start Time: 1031     PT Stop Time: 1050  PT Total Time (min): 19 min     Billable Minutes: Evaluation 10 and Therapeutic Activity 9    Penny Hernandez PT, DPT  2/28/2020  Pager: [phone]

## 2020-02-28 NOTE — PLAN OF CARE
Problem: Fall Injury Risk  Goal: Absence of Fall and Fall-Related Injury  Outcome: Ongoing, Progressing  Intervention: Identify and Manage Contributors to Fall Injury Risk  Flowsheets (Taken 2/28/2020 0705)  Medication Review/Management: medications reviewed  Intervention: Promote Injury-Free Environment  Flowsheets (Taken 2/28/2020 0705)  Safety Promotion/Fall Prevention: assistive device/personal item within reach; bed alarm set  Environmental Safety Modification: assistive device/personal items within reach; clutter free environment maintained; lighting adjusted     Problem: Adult Inpatient Plan of Care  Goal: Plan of Care Review  Outcome: Ongoing, Progressing  Goal: Patient-Specific Goal (Individualization)  Outcome: Ongoing, Progressing  Goal: Absence of Hospital-Acquired Illness or Injury  Outcome: Ongoing, Progressing  Goal: Optimal Comfort and Wellbeing  Outcome: Ongoing, Progressing  Goal: Readiness for Transition of Care  Outcome: Ongoing, Progressing  Goal: Rounds/Family Conference  Outcome: Ongoing, Progressing

## 2020-02-28 NOTE — PLAN OF CARE
02/28/20 1559   Post-Acute Status   Post-Acute Authorization Placement   Post-Acute Placement Status   (LOCET/PASSR)     SW completed LOCET and faxed PASRR to state. Waiting on 142. SW will follow up as needed.     Glenys Solorzano LMSW  Case Management   [facility]

## 2020-02-28 NOTE — PROGRESS NOTES
[facility] Hospital Medicine                                                                     Progress Note     Team: [facility] HOSP MED G Gerber Shaw MD   Admit Date: 2/27/2020   Hospital Day: 1  LETICIA:  3/1/2020  Code status: Full Code   Principal Problem: Acute cystitis without hematuria     SUMMARY:     Selma Lux is a 84 y.o. female with hx of HFrEF, COPD, RA on plaquenil and cellcept, HTN, HPLD, TIA presenting for generalized weakness and occasional falls for the past 2 weeks. 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 She states that her PT was commenting on how much weaker she had gotten as well. The patient does complain of not being able to do the things she would like to anymore and that she was probably going to see a psychiatrist soon. Patient is on valium, gabapentin, and baclofen. Per patients daughter she only takes valium and higher dose of gabapentin at night for sleep. Baclofen is only taken once a day now, per her PCP the last time she saw her in the clinic. In ED, CXR with infiltrates, UA concerning for UTI, medicine called for admission.     Admitted to hospital medicine for acute metabolic encephalopathy suspected secondary to UTI and CAP. Obtained urine and started on BSabx. Unfortunately for blood cultures not obtained, ordered, NGTD. Hypoglycemia noted, suspecting from infection and poor po intake. Since admission patient has improved with supportive care.       SUBJECTIVE:     Pt was seen and examined at bedside. Admitted overnight. This morning patient is Aox3 and doing well. Feels weak overall but states she feels better overall. No cough endorse, no sputum. No other  complaints endorsed. Advised improving her oral intake due to hypoglycemia. Worked with PT OT rec SNF at this time. CM and SW updated. Referrals have been sent.    ROS (Positive in Bold, otherwise negative)  Pain Scale: 0 /10   Constitutional: fever, chills, night sweats, generalized weakness   CV: chest pain, edema, palpitations  Resp: SOB, cough, sputum production  GI: changes in appetite, NVDC, pain, melena, hematochezia, GERD, hematemesis  : Dysuria, hematuria, urinary urgency, frequency  MSK: arthralgia/myalgia, joint swelling  SKIN: rashes, pruritis, petechiae   Neuro/Psych: FND, anxiety, depression      OBJECTIVE:     Vitals:  Temp:  [98.4 °F (36.9 °C)-99.1 °F (37.3 °C)]   Pulse:  [100-113]   Resp:  [17-25]   BP: (115-152)/(57-84)   SpO2:  [93 %-100 %]      I & O (Last 24H):   No intake or output data in the 24 hours ending 02/28/20 1423      GEN: AA female  in no acute distress. Nontoxic. Resting in bed. Cooperative.  HEENT: NCAT. PERRL. EOMI. Conjunctivae/corneas clear, sclera Anicteric.  CVS: RRR. Normal s1 s2 no murmur, click, rub or gallop  LUNG: CTAB. Normal respiratory effort. No wheezes, rhonchi, or crackles.  ABD: Normoactive BS, soft, NT, ND, no masses or organomegaly.  EXT: No edema. No cyanosis. Full ROM.  SKIN: color, texture, turgor normal. No rashes or lesions  NEURO: Alert, oriented x 4, Spont mvt of all extremities with no focal deficits noted.      All recent labs and imaging has been reviewed.     Recent Results (from the past 24 hour(s))   CBC auto differential    Collection Time: 02/27/20  6:08 PM   Result Value Ref Range    WBC 11.55 3.90 - 12.70 K/uL    RBC 4.60 4.00 - 5.40 M/uL    Hemoglobin 11.9 (L) 12.0 - 16.0 g/dL    Hematocrit 40.9 37.0 - 48.5 %    Mean Corpuscular Volume 89 82 - 98 fL    Mean Corpuscular Hemoglobin 25.9 (L) 27.0 - 31.0 pg    Mean Corpuscular Hemoglobin Conc 29.1 (L) 32.0 - 36.0 g/dL    RDW 15.6 (H) 11.5 - 14.5 %    Platelets 266 150 - 350 K/uL    MPV 10.4 9.2 -  12.9 fL    Immature Granulocytes 0.5 0.0 - 0.5 %    Gran # (ANC) 5.9 1.8 - 7.7 K/uL    Immature Grans (Abs) 0.06 (H) 0.00 - 0.04 K/uL    Lymph # 2.0 1.0 - 4.8 K/uL    Mono # 3.3 (H) 0.3 - 1.0 K/uL    Eos # 0.3 0.0 - 0.5 K/uL    Baso # 0.03 0.00 - 0.20 K/uL    nRBC 0 0 /100 WBC    Gran% 50.8 38.0 - 73.0 %    Lymph% 16.9 (L) 18.0 - 48.0 %    Mono% 28.6 (H) 4.0 - 15.0 %    Eosinophil% 2.9 0.0 - 8.0 %    Basophil% 0.3 0.0 - 1.9 %    Platelet Estimate Appears normal     Differential Method Automated    Urinalysis, Reflex to Urine Culture Urine, Clean Catch    Collection Time: 02/27/20  7:56 PM   Result Value Ref Range    Specimen UA Urine, Clean Catch     Color, UA Yellow Yellow, Straw, Sissy    Appearance, UA Hazy (A) Clear    pH, UA 5.0 5.0 - 8.0    Specific Gravity, UA 1.005 1.005 - 1.030    Protein, UA Negative Negative    Glucose, UA Negative Negative    Ketones, UA Negative Negative    Bilirubin (UA) Negative Negative    Occult Blood UA 1+ (A) Negative    Nitrite, UA Positive (A) Negative    Leukocytes, UA 3+ (A) Negative   Urinalysis Microscopic    Collection Time: 02/27/20  7:56 PM   Result Value Ref Range    RBC, UA 1 0 - 4 /hpf    WBC, UA 98 (H) 0 - 5 /hpf    WBC Clumps, UA Rare None-Rare    Bacteria Many (A) None-Occ /hpf    Squam Epithel, UA 1 /hpf    Microscopic Comment SEE COMMENT    CPK    Collection Time: 02/28/20 12:05 AM   Result Value Ref Range    CPK 93 20 - 180 U/L   Lactic acid, plasma    Collection Time: 02/28/20 12:05 AM   Result Value Ref Range    Lactate (Lactic Acid) 1.1 0.5 - 2.2 mmol/L   Comprehensive metabolic panel    Collection Time: 02/28/20 12:05 AM   Result Value Ref Range    Sodium 139 136 - 145 mmol/L    Potassium 4.3 3.5 - 5.1 mmol/L    Chloride 103 95 - 110 mmol/L    CO2 24 23 - 29 mmol/L    Glucose 64 (L) 70 - 110 mg/dL    BUN, Bld 15 8 - 23 mg/dL    Creatinine 1.3 0.5 - 1.4 mg/dL    Calcium 9.1 8.7 - 10.5 mg/dL    Total Protein 6.8 6.0 - 8.4 g/dL    Albumin 3.3 (L) 3.5 - 5.2 g/dL     Total Bilirubin 1.0 0.1 - 1.0 mg/dL    Alkaline Phosphatase 76 55 - 135 U/L    AST 22 10 - 40 U/L    ALT 10 10 - 44 U/L    Anion Gap 12 8 - 16 mmol/L    eGFR if African American 44.1 (A) >60 mL/min/1.73 m^2    eGFR if non  38.3 (A) >60 mL/min/1.73 m^2   TSH    Collection Time: 02/28/20 12:05 AM   Result Value Ref Range    TSH 0.395 (L) 0.400 - 4.000 uIU/mL   T4, free    Collection Time: 02/28/20 12:05 AM   Result Value Ref Range    Free T4 1.43 0.71 - 1.51 ng/dL   POCT glucose    Collection Time: 02/28/20  8:56 AM   Result Value Ref Range    POCT Glucose 45 (LL) 70 - 110 mg/dL   POCT glucose    Collection Time: 02/28/20  8:57 AM   Result Value Ref Range    POCT Glucose 48 (LL) 70 - 110 mg/dL   Basic Metabolic Panel (BMP)    Collection Time: 02/28/20  9:26 AM   Result Value Ref Range    Sodium 139 136 - 145 mmol/L    Potassium 3.5 3.5 - 5.1 mmol/L    Chloride 103 95 - 110 mmol/L    CO2 22 (L) 23 - 29 mmol/L    Glucose 67 (L) 70 - 110 mg/dL    BUN, Bld 14 8 - 23 mg/dL    Creatinine 1.1 0.5 - 1.4 mg/dL    Calcium 9.4 8.7 - 10.5 mg/dL    Anion Gap 14 8 - 16 mmol/L    eGFR if African American 54.0 (A) >60 mL/min/1.73 m^2    eGFR if non  46.9 (A) >60 mL/min/1.73 m^2   Magnesium    Collection Time: 02/28/20  9:26 AM   Result Value Ref Range    Magnesium 1.7 1.6 - 2.6 mg/dL   Phosphorus    Collection Time: 02/28/20  9:26 AM   Result Value Ref Range    Phosphorus 4.0 2.7 - 4.5 mg/dL   CBC with Automated Differential    Collection Time: 02/28/20  9:26 AM   Result Value Ref Range    WBC 10.27 3.90 - 12.70 K/uL    RBC 4.75 4.00 - 5.40 M/uL    Hemoglobin 12.4 12.0 - 16.0 g/dL    Hematocrit 41.5 37.0 - 48.5 %    Mean Corpuscular Volume 87 82 - 98 fL    Mean Corpuscular Hemoglobin 26.1 (L) 27.0 - 31.0 pg    Mean Corpuscular Hemoglobin Conc 29.9 (L) 32.0 - 36.0 g/dL    RDW 15.5 (H) 11.5 - 14.5 %    Platelets 267 150 - 350 K/uL    MPV 10.3 9.2 - 12.9 fL    Immature Granulocytes 0.5 0.0 - 0.5 %     Gran # (ANC) 5.1 1.8 - 7.7 K/uL    Immature Grans (Abs) 0.05 (H) 0.00 - 0.04 K/uL    Lymph # 1.9 1.0 - 4.8 K/uL    Mono # 2.8 (H) 0.3 - 1.0 K/uL    Eos # 0.4 0.0 - 0.5 K/uL    Baso # 0.03 0.00 - 0.20 K/uL    nRBC 0 0 /100 WBC    Gran% 49.6 38.0 - 73.0 %    Lymph% 18.5 18.0 - 48.0 %    Mono% 27.6 (H) 4.0 - 15.0 %    Eosinophil% 3.5 0.0 - 8.0 %    Basophil% 0.3 0.0 - 1.9 %    Platelet Estimate Appears normal     Aniso Slight     Poik Slight     Poly Occasional     Ovalocytes Occasional     Ripon Cells Occasional     Differential Method Automated    POCT glucose    Collection Time: 02/28/20  9:30 AM   Result Value Ref Range    POCT Glucose 63 (L) 70 - 110 mg/dL   POCT glucose    Collection Time: 02/28/20 11:06 AM   Result Value Ref Range    POCT Glucose 110 70 - 110 mg/dL       Recent Labs   Lab 02/28/20  0856 02/28/20  0857 02/28/20  0930 02/28/20  1106   POCTGLUCOSE 45* 48* 63* 110       Hemoglobin A1C   Date Value Ref Range Status   08/27/2019 5.6 4.0 - 5.6 % Final     Comment:     ADA Screening Guidelines:  5.7-6.4%  Consistent with prediabetes  >or=6.5%  Consistent with diabetes  High levels of fetal hemoglobin interfere with the HbA1C  assay. Heterozygous hemoglobin variants (HbS, HgC, etc)do  not significantly interfere with this assay.   However, presence of multiple variants may affect accuracy.     03/13/2019 5.7 (H) 4.0 - 5.6 % Final     Comment:     ADA Screening Guidelines:  5.7-6.4%  Consistent with prediabetes  >or=6.5%  Consistent with diabetes  High levels of fetal hemoglobin interfere with the HbA1C  assay. Heterozygous hemoglobin variants (HbS, HgC, etc)do  not significantly interfere with this assay.   However, presence of multiple variants may affect accuracy.     08/24/2017 5.5 4.0 - 5.6 % Final     Comment:     According to ADA guidelines, hemoglobin A1c <7.0% represents  optimal control in non-pregnant diabetic patients. Different  metrics may apply to specific patient populations.   Standards of  Medical Care in Diabetes-2016.  For the purpose of screening for the presence of diabetes:  <5.7%     Consistent with the absence of diabetes  5.7-6.4%  Consistent with increasing risk for diabetes   (prediabetes)  >or=6.5%  Consistent with diabetes  Currently, no consensus exists for use of hemoglobin A1c  for diagnosis of diabetes for children.  This Hemoglobin A1c assay has significant interference with fetal   hemoglobin   (HbF). The results are invalid for patients with abnormal amounts of   HbF,   including those with known Hereditary Persistence   of Fetal Hemoglobin. Heterozygous hemoglobin variants (HbAS, HbAC,   HbAD, HbAE, HbA2) do not significantly interfere with this assay;   however, presence of multiple variants in a sample may impact the %   interference.          Active Hospital Problems    Diagnosis  POA    *Acute cystitis without hematuria [N30.00]  Yes    Arthritis [M19.90]  Yes    Hyperlipidemia [E78.5]  Yes    AF (paroxysmal atrial fibrillation) [I48.0]  Yes    Generalized weakness [R53.1]  Yes    Hypertension, essential [I10]  Yes    Iron deficiency anemia secondary to inadequate dietary iron intake [D50.8]  Yes      Resolved Hospital Problems   No resolved problems to display.          ASSESSMENT AND PLAN:     Acute cystitis  - symptoms concerning for UTI with altered mentation  - UA positive for bacteria and WBC, leuk and nitrates  - given fluids and rocephin  - will follow up urine cx, presumptive ecoli as on now, pending final, de-escalate abx as needed  - bcx not taken in ED, ordered, NGTD      Community acquired pneumonia  - CXR with infiltrates however no fever or cough as per patient this AM  - continue azithro with rocephin for now, de-escalate as needed  - continue inhalers      Acute encephalopathy - resolved  - metabolic secondary to above, resolved  - Patient is on valium, gabapentin, and baclofen, likely contributed to this     Generalized weakness  Multiple falls at  home  - secondary to above issues  - PT OT rec SNF  - CM SW updated, they have sent referrals    HFrEF  - last TTE in 8/2019 with 40% EF  - appears compensated at this time    HTN  - hold home amlodipine for now    RA  - hold cellcept for now due to infection  - continue home plaquenil and steroids    HLD  - continue statin      Prophylaxis- lovenox  Code Status- Full Code   Discharge plan and follow up - d/c to SNF as per PT OT once medically stable    Gerber Shaw MD  Hospital Medicine Staff  Pager [phone]

## 2020-02-29 ENCOUNTER — EXTERNAL CHRONIC CARE MANAGEMENT (OUTPATIENT)
Dept: PRIMARY CARE CLINIC | Facility: CLINIC | Age: 85
DRG: 689 | End: 2020-02-29
Payer: MEDICARE

## 2020-02-29 LAB
ALBUMIN SERPL BCP-MCNC: 2.7 G/DL (ref 3.5–5.2)
ALP SERPL-CCNC: 62 U/L (ref 55–135)
ALT SERPL W/O P-5'-P-CCNC: 8 U/L (ref 10–44)
ANION GAP SERPL CALC-SCNC: 11 MMOL/L (ref 8–16)
ANISOCYTOSIS BLD QL SMEAR: SLIGHT
AST SERPL-CCNC: 12 U/L (ref 10–40)
BACTERIA UR CULT: ABNORMAL
BASOPHILS # BLD AUTO: 0.03 K/UL (ref 0–0.2)
BASOPHILS NFR BLD: 0.2 % (ref 0–1.9)
BILIRUB SERPL-MCNC: 0.6 MG/DL (ref 0.1–1)
BUN SERPL-MCNC: 11 MG/DL (ref 8–23)
CALCIUM SERPL-MCNC: 8.6 MG/DL (ref 8.7–10.5)
CHLORIDE SERPL-SCNC: 102 MMOL/L (ref 95–110)
CO2 SERPL-SCNC: 24 MMOL/L (ref 23–29)
CREAT SERPL-MCNC: 1.2 MG/DL (ref 0.5–1.4)
DIFFERENTIAL METHOD: ABNORMAL
EOSINOPHIL # BLD AUTO: 0.4 K/UL (ref 0–0.5)
EOSINOPHIL NFR BLD: 3.3 % (ref 0–8)
ERYTHROCYTE [DISTWIDTH] IN BLOOD BY AUTOMATED COUNT: 15.5 % (ref 11.5–14.5)
EST. GFR  (AFRICAN AMERICAN): 48.6 ML/MIN/1.73 M^2
EST. GFR  (NON AFRICAN AMERICAN): 42.2 ML/MIN/1.73 M^2
GLUCOSE SERPL-MCNC: 124 MG/DL (ref 70–110)
HCT VFR BLD AUTO: 36.1 % (ref 37–48.5)
HGB BLD-MCNC: 10.7 G/DL (ref 12–16)
IMM GRANULOCYTES # BLD AUTO: 0.06 K/UL (ref 0–0.04)
IMM GRANULOCYTES NFR BLD AUTO: 0.5 % (ref 0–0.5)
LYMPHOCYTES # BLD AUTO: 1.5 K/UL (ref 1–4.8)
LYMPHOCYTES NFR BLD: 11.8 % (ref 18–48)
MAGNESIUM SERPL-MCNC: 1.5 MG/DL (ref 1.6–2.6)
MCH RBC QN AUTO: 25.7 PG (ref 27–31)
MCHC RBC AUTO-ENTMCNC: 29.6 G/DL (ref 32–36)
MCV RBC AUTO: 87 FL (ref 82–98)
MONOCYTES # BLD AUTO: 3.7 K/UL (ref 0.3–1)
MONOCYTES NFR BLD: 28.6 % (ref 4–15)
NEUTROPHILS # BLD AUTO: 7.1 K/UL (ref 1.8–7.7)
NEUTROPHILS NFR BLD: 55.6 % (ref 38–73)
NRBC BLD-RTO: 0 /100 WBC
OVALOCYTES BLD QL SMEAR: ABNORMAL
PHOSPHATE SERPL-MCNC: 3 MG/DL (ref 2.7–4.5)
PLATELET # BLD AUTO: 266 K/UL (ref 150–350)
PLATELET BLD QL SMEAR: ABNORMAL
PMV BLD AUTO: 10.4 FL (ref 9.2–12.9)
POCT GLUCOSE: 103 MG/DL (ref 70–110)
POCT GLUCOSE: 105 MG/DL (ref 70–110)
POCT GLUCOSE: 116 MG/DL (ref 70–110)
POCT GLUCOSE: 131 MG/DL (ref 70–110)
POCT GLUCOSE: 139 MG/DL (ref 70–110)
POCT GLUCOSE: 142 MG/DL (ref 70–110)
POLYCHROMASIA BLD QL SMEAR: ABNORMAL
POTASSIUM SERPL-SCNC: 3.4 MMOL/L (ref 3.5–5.1)
PROCALCITONIN SERPL IA-MCNC: 0.2 NG/ML
PROT SERPL-MCNC: 5.7 G/DL (ref 6–8.4)
RBC # BLD AUTO: 4.17 M/UL (ref 4–5.4)
SODIUM SERPL-SCNC: 137 MMOL/L (ref 136–145)
WBC # BLD AUTO: 12.76 K/UL (ref 3.9–12.7)

## 2020-02-29 PROCEDURE — 99490 PR CHRONIC CARE MGMT, 1ST 20 MIN: ICD-10-PCS | Mod: S$PBB,,, | Performed by: FAMILY MEDICINE

## 2020-02-29 PROCEDURE — 86580 TB INTRADERMAL TEST: CPT | Performed by: INTERNAL MEDICINE

## 2020-02-29 PROCEDURE — 83735 ASSAY OF MAGNESIUM: CPT

## 2020-02-29 PROCEDURE — 36415 COLL VENOUS BLD VENIPUNCTURE: CPT

## 2020-02-29 PROCEDURE — 99490 CHRNC CARE MGMT STAFF 1ST 20: CPT | Mod: S$PBB,,, | Performed by: FAMILY MEDICINE

## 2020-02-29 PROCEDURE — 99231 SBSQ HOSP IP/OBS SF/LOW 25: CPT | Mod: ,,, | Performed by: INTERNAL MEDICINE

## 2020-02-29 PROCEDURE — 99231 PR SUBSEQUENT HOSPITAL CARE,LEVL I: ICD-10-PCS | Mod: ,,, | Performed by: INTERNAL MEDICINE

## 2020-02-29 PROCEDURE — 11000001 HC ACUTE MED/SURG PRIVATE ROOM

## 2020-02-29 PROCEDURE — 25000003 PHARM REV CODE 250: Performed by: INTERNAL MEDICINE

## 2020-02-29 PROCEDURE — 84100 ASSAY OF PHOSPHORUS: CPT

## 2020-02-29 PROCEDURE — 63600175 PHARM REV CODE 636 W HCPCS: Performed by: INTERNAL MEDICINE

## 2020-02-29 PROCEDURE — 85025 COMPLETE CBC W/AUTO DIFF WBC: CPT

## 2020-02-29 PROCEDURE — 30200315 PPD INTRADERMAL TEST REV CODE 302: Performed by: INTERNAL MEDICINE

## 2020-02-29 PROCEDURE — 99490 CHRNC CARE MGMT STAFF 1ST 20: CPT | Mod: PBBFAC | Performed by: FAMILY MEDICINE

## 2020-02-29 PROCEDURE — 84145 PROCALCITONIN (PCT): CPT

## 2020-02-29 PROCEDURE — 80053 COMPREHEN METABOLIC PANEL: CPT

## 2020-02-29 RX ORDER — POTASSIUM CHLORIDE 20 MEQ/1
40 TABLET, EXTENDED RELEASE ORAL ONCE
Status: COMPLETED | OUTPATIENT
Start: 2020-02-29 | End: 2020-02-29

## 2020-02-29 RX ADMIN — GABAPENTIN 300 MG: 300 CAPSULE ORAL at 08:02

## 2020-02-29 RX ADMIN — Medication 6 MG: at 11:02

## 2020-02-29 RX ADMIN — HYDROXYCHLOROQUINE SULFATE 200 MG: 200 TABLET, FILM COATED ORAL at 08:02

## 2020-02-29 RX ADMIN — CEFTRIAXONE SODIUM 1 G: 1 INJECTION, POWDER, FOR SOLUTION INTRAMUSCULAR; INTRAVENOUS at 05:02

## 2020-02-29 RX ADMIN — PANTOPRAZOLE SODIUM 40 MG: 40 TABLET, DELAYED RELEASE ORAL at 09:02

## 2020-02-29 RX ADMIN — ATORVASTATIN CALCIUM 40 MG: 20 TABLET, FILM COATED ORAL at 09:02

## 2020-02-29 RX ADMIN — HYDROXYCHLOROQUINE SULFATE 200 MG: 200 TABLET, FILM COATED ORAL at 09:02

## 2020-02-29 RX ADMIN — ENOXAPARIN SODIUM 40 MG: 100 INJECTION SUBCUTANEOUS at 06:02

## 2020-02-29 RX ADMIN — PREDNISONE 5 MG: 5 TABLET ORAL at 09:02

## 2020-02-29 RX ADMIN — TUBERCULIN PURIFIED PROTEIN DERIVATIVE 5 UNITS: 5 INJECTION, SOLUTION INTRADERMAL at 03:02

## 2020-02-29 RX ADMIN — GABAPENTIN 100 MG: 100 CAPSULE ORAL at 06:02

## 2020-02-29 RX ADMIN — AZITHROMYCIN MONOHYDRATE 500 MG: 250 TABLET ORAL at 09:02

## 2020-02-29 RX ADMIN — FUROSEMIDE 40 MG: 40 TABLET ORAL at 09:02

## 2020-02-29 RX ADMIN — MAGNESIUM SULFATE HEPTAHYDRATE 3 G: 500 INJECTION, SOLUTION INTRAMUSCULAR; INTRAVENOUS at 09:02

## 2020-02-29 RX ADMIN — POTASSIUM CHLORIDE 40 MEQ: 1500 TABLET, EXTENDED RELEASE ORAL at 03:02

## 2020-02-29 RX ADMIN — ASPIRIN 81 MG: 81 TABLET, COATED ORAL at 09:02

## 2020-02-29 NOTE — PROGRESS NOTES
[facility] Hospital Medicine                                                                     Progress Note     Team: [facility] HOSP MED G Gerber Shaw MD   Admit Date: 2/27/2020   Hospital Day: 2  LETICIA: 3/3/2020  Code status: Full Code   Principal Problem: Acute cystitis without hematuria     SUMMARY:     Selma Lux is a 84 y.o. female with hx of HFrEF, COPD, RA on plaquenil and cellcept, HTN, HPLD, TIA presenting for generalized weakness and occasionalmechanical falls for the past 2 weeks. 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 She states that her PT was commenting on how much weaker she had gotten as well. The patient does complain of not being able to do the things she would like to anymore and that she was probably going to see a psychiatrist soon. Patient is on valium, gabapentin, and baclofen. Per patients daughter she only takes valium and higher dose of gabapentin at night for sleep. Baclofen is only taken once a day now, per her PCP the last time she saw her in the clinic. In ED, CXR with infiltrates and UA concerning for UTI. Medicine called for admission.     Admitted to hospital medicine for acute metabolic encephalopathy suspected secondary to UTI and CAP. Obtained urine and started on BSabx. Unfortunately for blood cultures not obtained, ordered, NGTD. Urine with presumptive E Coli. Hypoglycemia noted, suspecting from infection and poor po intake. Since admission patient has gradually improved with supportive care.       SUBJECTIVE:     Pt was seen and examined at bedside. No acute events overnight. Feels better overall but still very weak. Glucose stable. Diet improving. No cp,  dyspnea, cough or sputum endorsed. IS ordered.    ROS (Positive in Bold, otherwise negative)  Pain Scale: 0 /10   Constitutional: fever, chills, night sweats, generalized weakness   CV: chest pain, edema, palpitations  Resp: SOB, cough, sputum production  GI: changes in appetite, NVDC, pain, melena, hematochezia, GERD, hematemesis  : Dysuria, hematuria, urinary urgency, frequency  MSK: arthralgia/myalgia, joint swelling  SKIN: rashes, pruritis, petechiae   Neuro/Psych: FND, anxiety, depression      OBJECTIVE:     Vitals:  Temp:  [98.5 °F (36.9 °C)-99.4 °F (37.4 °C)]   Pulse:  []   Resp:  [16-18]   BP: (111-135)/(53-64)   SpO2:  [92 %-95 %]      I & O (Last 24H):     Intake/Output Summary (Last 24 hours) at 2/29/2020 1347  Last data filed at 2/29/2020 0400  Gross per 24 hour   Intake 480 ml   Output 800 ml   Net -320 ml         GEN: AA female  in no acute distress. Nontoxic. Resting in bed. Cooperative. Lethargic.  HEENT: NCAT. PERRL. EOMI. Conjunctivae/corneas clear, sclera Anicteric.  CVS: RRR. Normal s1 s2 +murmur, no click, rub or gallop  LUNG: CTAB. Normal respiratory effort. No wheezes, rhonchi, or crackles.  ABD: Normoactive BS, soft, NT, ND, no masses or organomegaly.  EXT: No edema. No cyanosis. Full ROM.  SKIN: color, texture, turgor normal. No rashes or lesions  NEURO: Alert, oriented x 4, Spont mvt of all extremities with no focal deficits noted.      All recent labs and imaging has been reviewed.     Recent Results (from the past 24 hour(s))   POCT glucose    Collection Time: 02/28/20  2:56 PM   Result Value Ref Range    POCT Glucose 180 (H) 70 - 110 mg/dL   POCT glucose    Collection Time: 02/28/20  6:54 PM   Result Value Ref Range    POCT Glucose 110 70 - 110 mg/dL   POCT glucose    Collection Time: 02/28/20 11:22 PM   Result Value Ref Range    POCT Glucose 119 (H) 70 - 110 mg/dL   POCT glucose    Collection Time: 02/29/20  4:57 AM   Result Value Ref Range    POCT Glucose 103 70 - 110 mg/dL    Magnesium    Collection Time: 02/29/20  6:25 AM   Result Value Ref Range    Magnesium 1.5 (L) 1.6 - 2.6 mg/dL   Phosphorus    Collection Time: 02/29/20  6:25 AM   Result Value Ref Range    Phosphorus 3.0 2.7 - 4.5 mg/dL   CBC with Automated Differential    Collection Time: 02/29/20  6:25 AM   Result Value Ref Range    WBC 12.76 (H) 3.90 - 12.70 K/uL    RBC 4.17 4.00 - 5.40 M/uL    Hemoglobin 10.7 (L) 12.0 - 16.0 g/dL    Hematocrit 36.1 (L) 37.0 - 48.5 %    Mean Corpuscular Volume 87 82 - 98 fL    Mean Corpuscular Hemoglobin 25.7 (L) 27.0 - 31.0 pg    Mean Corpuscular Hemoglobin Conc 29.6 (L) 32.0 - 36.0 g/dL    RDW 15.5 (H) 11.5 - 14.5 %    Platelets 266 150 - 350 K/uL    MPV 10.4 9.2 - 12.9 fL    Immature Granulocytes 0.5 0.0 - 0.5 %    Gran # (ANC) 7.1 1.8 - 7.7 K/uL    Immature Grans (Abs) 0.06 (H) 0.00 - 0.04 K/uL    Lymph # 1.5 1.0 - 4.8 K/uL    Mono # 3.7 (H) 0.3 - 1.0 K/uL    Eos # 0.4 0.0 - 0.5 K/uL    Baso # 0.03 0.00 - 0.20 K/uL    nRBC 0 0 /100 WBC    Gran% 55.6 38.0 - 73.0 %    Lymph% 11.8 (L) 18.0 - 48.0 %    Mono% 28.6 (H) 4.0 - 15.0 %    Eosinophil% 3.3 0.0 - 8.0 %    Basophil% 0.2 0.0 - 1.9 %    Platelet Estimate Appears normal     Aniso Slight     Poly Occasional     Ovalocytes Occasional     Differential Method Automated    Comprehensive metabolic panel    Collection Time: 02/29/20  6:25 AM   Result Value Ref Range    Sodium 137 136 - 145 mmol/L    Potassium 3.4 (L) 3.5 - 5.1 mmol/L    Chloride 102 95 - 110 mmol/L    CO2 24 23 - 29 mmol/L    Glucose 124 (H) 70 - 110 mg/dL    BUN, Bld 11 8 - 23 mg/dL    Creatinine 1.2 0.5 - 1.4 mg/dL    Calcium 8.6 (L) 8.7 - 10.5 mg/dL    Total Protein 5.7 (L) 6.0 - 8.4 g/dL    Albumin 2.7 (L) 3.5 - 5.2 g/dL    Total Bilirubin 0.6 0.1 - 1.0 mg/dL    Alkaline Phosphatase 62 55 - 135 U/L    AST 12 10 - 40 U/L    ALT 8 (L) 10 - 44 U/L    Anion Gap 11 8 - 16 mmol/L    eGFR if African American 48.6 (A) >60 mL/min/1.73 m^2    eGFR if non  42.2 (A) >60  mL/min/1.73 m^2   POCT glucose    Collection Time: 02/29/20  6:25 AM   Result Value Ref Range    POCT Glucose 131 (H) 70 - 110 mg/dL   POCT glucose    Collection Time: 02/29/20 11:24 AM   Result Value Ref Range    POCT Glucose 142 (H) 70 - 110 mg/dL       Recent Labs   Lab 02/28/20  1456 02/28/20  1854 02/28/20  2322 02/29/20  0457 02/29/20  0625 02/29/20  1124   POCTGLUCOSE 180* 110 119* 103 131* 142*       Hemoglobin A1C   Date Value Ref Range Status   08/27/2019 5.6 4.0 - 5.6 % Final     Comment:     ADA Screening Guidelines:  5.7-6.4%  Consistent with prediabetes  >or=6.5%  Consistent with diabetes  High levels of fetal hemoglobin interfere with the HbA1C  assay. Heterozygous hemoglobin variants (HbS, HgC, etc)do  not significantly interfere with this assay.   However, presence of multiple variants may affect accuracy.     03/13/2019 5.7 (H) 4.0 - 5.6 % Final     Comment:     ADA Screening Guidelines:  5.7-6.4%  Consistent with prediabetes  >or=6.5%  Consistent with diabetes  High levels of fetal hemoglobin interfere with the HbA1C  assay. Heterozygous hemoglobin variants (HbS, HgC, etc)do  not significantly interfere with this assay.   However, presence of multiple variants may affect accuracy.     08/24/2017 5.5 4.0 - 5.6 % Final     Comment:     According to ADA guidelines, hemoglobin A1c <7.0% represents  optimal control in non-pregnant diabetic patients. Different  metrics may apply to specific patient populations.   Standards of Medical Care in Diabetes-2016.  For the purpose of screening for the presence of diabetes:  <5.7%     Consistent with the absence of diabetes  5.7-6.4%  Consistent with increasing risk for diabetes   (prediabetes)  >or=6.5%  Consistent with diabetes  Currently, no consensus exists for use of hemoglobin A1c  for diagnosis of diabetes for children.  This Hemoglobin A1c assay has significant interference with fetal   hemoglobin   (HbF). The results are invalid for patients with  abnormal amounts of   HbF,   including those with known Hereditary Persistence   of Fetal Hemoglobin. Heterozygous hemoglobin variants (HbAS, HbAC,   HbAD, HbAE, HbA2) do not significantly interfere with this assay;   however, presence of multiple variants in a sample may impact the %   interference.          Active Hospital Problems    Diagnosis  POA    *Acute cystitis without hematuria [N30.00]  Yes    Arthritis [M19.90]  Yes    Hyperlipidemia [E78.5]  Yes    AF (paroxysmal atrial fibrillation) [I48.0]  Yes    Generalized weakness [R53.1]  Yes    Hypertension, essential [I10]  Yes    Iron deficiency anemia secondary to inadequate dietary iron intake [D50.8]  Yes      Resolved Hospital Problems   No resolved problems to display.          ASSESSMENT AND PLAN:     Acute cystitis  - symptoms concerning for UTI with altered mentation  - UA positive for bacteria and WBC, leuk and nitrates  - given fluids and rocephin  - will follow up urine cx, presumptive ecoli as on now, pending final, de-escalate abx as needed  - bcx not taken in ED, ordered, NGTD   - afebrile and HDS     Community acquired pneumonia  - CXR with infiltrates however no fever or cough as per patient this AM  - continue azithro with rocephin for now, de-escalate as needed  - she does not complain of dyspnea, cough or sputum to me  - questionable atelectasis, IS provided   - continue inhalers      Acute metabolic encephalopathy - resolved  - metabolic secondary to above, resolved  - Patient is on valium, gabapentin, and baclofen, likely contributed to this  - d/c home valium, decrease gabapentin dose, continue prn baclofen     Generalized weakness  Multiple falls at home  - secondary to above issues  - PT OT rec SNF  - CM SW updated, they have sent referrals    HFrEF  - last TTE in 8/2019 with 40% EF  - appears compensated at this time  - repeat TTE     HTN  - hold home amlodipine for now    RA  - hold cellcept for now due to infection  - continue  home plaquenil and steroids    HLD  - continue statin    Leukocytosis  - suspected reactive to above     Hypomagnesemia  - replete and recheck     Hypokalemia  - replete and recheck       Prophylaxis- lovenox  Code Status- Full Code   Discharge plan and follow up - d/c to SNF as per PT OT once medically stable    Gerber Shaw MD  Hospital Medicine Staff  Pager [phone]

## 2020-02-29 NOTE — PLAN OF CARE
Problem: Fall Injury Risk  Goal: Absence of Fall and Fall-Related Injury  Outcome: Ongoing, Progressing  Intervention: Promote Injury-Free Environment  Flowsheets (Taken 2/29/2020 0443)  Safety Promotion/Fall Prevention: assistive device/personal item within reach; bed alarm set; nonskid shoes/socks when out of bed; medications reviewed; lighting adjusted  Environmental Safety Modification: assistive device/personal items within reach; clutter free environment maintained; lighting adjusted     Problem: Adult Inpatient Plan of Care  Goal: Plan of Care Review  Outcome: Ongoing, Progressing  Flowsheets (Taken 2/29/2020 0443)  Plan of Care Reviewed With: patient  Goal: Optimal Comfort and Wellbeing  Outcome: Ongoing, Progressing  Intervention: Provide Person-Centered Care  Flowsheets (Taken 2/29/2020 0443)  Trust Relationship/Rapport: care explained; emotional support provided; empathic listening provided; thoughts/feelings acknowledged    POC reviewed with patient. All questions and concerns reviewed. Fall/safety precautions implemented and maintained. Purewick care provided. Blood glucose monitored. No acute events noted this shift. Please see flow sheet for full assessment and vitals. Bed locked in lowest position. Call bell within reach. Will continue to monitor.

## 2020-03-01 LAB
ANION GAP SERPL CALC-SCNC: 10 MMOL/L (ref 8–16)
ANISOCYTOSIS BLD QL SMEAR: SLIGHT
AV INDEX (PROSTH): 0.85
AV MEAN GRADIENT: 7 MMHG
AV PEAK GRADIENT: 12 MMHG
AV VALVE AREA: 2.69 CM2
AV VELOCITY RATIO: 0.81
BASOPHILS # BLD AUTO: 0.04 K/UL (ref 0–0.2)
BASOPHILS NFR BLD: 0.3 % (ref 0–1.9)
BSA FOR ECHO PROCEDURE: 1.95 M2
BUN SERPL-MCNC: 10 MG/DL (ref 8–23)
CALCIUM SERPL-MCNC: 8.7 MG/DL (ref 8.7–10.5)
CHLORIDE SERPL-SCNC: 102 MMOL/L (ref 95–110)
CO2 SERPL-SCNC: 26 MMOL/L (ref 23–29)
CREAT SERPL-MCNC: 1.1 MG/DL (ref 0.5–1.4)
CV ECHO LV RWT: 0.56 CM
DIFFERENTIAL METHOD: ABNORMAL
DOP CALC AO PEAK VEL: 1.71 M/S
DOP CALC AO VTI: 27 CM
DOP CALC LVOT AREA: 3.2 CM2
DOP CALC LVOT DIAMETER: 2.01 CM
DOP CALC LVOT PEAK VEL: 1.39 M/S
DOP CALC LVOT STROKE VOLUME: 72.66 CM3
DOP CALCLVOT PEAK VEL VTI: 22.91 CM
E WAVE DECELERATION TIME: 107.44 MSEC
E/A RATIO: 0.8
E/E' RATIO: 10.84 M/S
ECHO LV POSTERIOR WALL: 1.12 CM (ref 0.6–1.1)
EOSINOPHIL # BLD AUTO: 0.4 K/UL (ref 0–0.5)
EOSINOPHIL NFR BLD: 3.3 % (ref 0–8)
ERYTHROCYTE [DISTWIDTH] IN BLOOD BY AUTOMATED COUNT: 15.4 % (ref 11.5–14.5)
EST. GFR  (AFRICAN AMERICAN): 54 ML/MIN/1.73 M^2
EST. GFR  (NON AFRICAN AMERICAN): 46.9 ML/MIN/1.73 M^2
FRACTIONAL SHORTENING: 28 % (ref 28–44)
GLUCOSE SERPL-MCNC: 95 MG/DL (ref 70–110)
HCT VFR BLD AUTO: 37.9 % (ref 37–48.5)
HGB BLD-MCNC: 11.4 G/DL (ref 12–16)
HYPOCHROMIA BLD QL SMEAR: ABNORMAL
IMM GRANULOCYTES # BLD AUTO: 0.04 K/UL (ref 0–0.04)
IMM GRANULOCYTES NFR BLD AUTO: 0.3 % (ref 0–0.5)
INTERVENTRICULAR SEPTUM: 1.1 CM (ref 0.6–1.1)
IVRT: 0.08 MSEC
LA MAJOR: 5.01 CM
LA MINOR: 5.2 CM
LA WIDTH: 4.06 CM
LEFT ATRIUM SIZE: 4 CM
LEFT ATRIUM VOLUME INDEX: 37.1 ML/M2
LEFT ATRIUM VOLUME: 70.45 CM3
LEFT INTERNAL DIMENSION IN SYSTOLE: 2.88 CM (ref 2.1–4)
LEFT VENTRICLE DIASTOLIC VOLUME INDEX: 36.9 ML/M2
LEFT VENTRICLE DIASTOLIC VOLUME: 69.99 ML
LEFT VENTRICLE MASS INDEX: 78 G/M2
LEFT VENTRICLE SYSTOLIC VOLUME INDEX: 16.7 ML/M2
LEFT VENTRICLE SYSTOLIC VOLUME: 31.7 ML
LEFT VENTRICULAR INTERNAL DIMENSION IN DIASTOLE: 4 CM (ref 3.5–6)
LEFT VENTRICULAR MASS: 147.57 G
LV LATERAL E/E' RATIO: 10.3 M/S
LV SEPTAL E/E' RATIO: 11.44 M/S
LYMPHOCYTES # BLD AUTO: 1.7 K/UL (ref 1–4.8)
LYMPHOCYTES NFR BLD: 13.1 % (ref 18–48)
MAGNESIUM SERPL-MCNC: 1.9 MG/DL (ref 1.6–2.6)
MCH RBC QN AUTO: 26 PG (ref 27–31)
MCHC RBC AUTO-ENTMCNC: 30.1 G/DL (ref 32–36)
MCV RBC AUTO: 86 FL (ref 82–98)
MONOCYTES # BLD AUTO: 3.6 K/UL (ref 0.3–1)
MONOCYTES NFR BLD: 28.9 % (ref 4–15)
MV PEAK A VEL: 1.29 M/S
MV PEAK E VEL: 1.03 M/S
NEUTROPHILS # BLD AUTO: 6.8 K/UL (ref 1.8–7.7)
NEUTROPHILS NFR BLD: 54.1 % (ref 38–73)
NRBC BLD-RTO: 0 /100 WBC
OVALOCYTES BLD QL SMEAR: ABNORMAL
PHOSPHATE SERPL-MCNC: 3.1 MG/DL (ref 2.7–4.5)
PISA TR MAX VEL: 3.02 M/S
PLATELET # BLD AUTO: 319 K/UL (ref 150–350)
PLATELET BLD QL SMEAR: ABNORMAL
PMV BLD AUTO: 10.4 FL (ref 9.2–12.9)
POCT GLUCOSE: 111 MG/DL (ref 70–110)
POCT GLUCOSE: 146 MG/DL (ref 70–110)
POCT GLUCOSE: 160 MG/DL (ref 70–110)
POCT GLUCOSE: 93 MG/DL (ref 70–110)
POIKILOCYTOSIS BLD QL SMEAR: SLIGHT
POTASSIUM SERPL-SCNC: 3.6 MMOL/L (ref 3.5–5.1)
RA MAJOR: 4.52 CM
RA PRESSURE: 3 MMHG
RA WIDTH: 2.45 CM
RBC # BLD AUTO: 4.39 M/UL (ref 4–5.4)
RIGHT VENTRICULAR END-DIASTOLIC DIMENSION: 2.98 CM
RV TISSUE DOPPLER FREE WALL SYSTOLIC VELOCITY 1 (APICAL 4 CHAMBER VIEW): 23.49 CM/S
SINUS: 3.1 CM
SODIUM SERPL-SCNC: 138 MMOL/L (ref 136–145)
TDI LATERAL: 0.1 M/S
TDI SEPTAL: 0.09 M/S
TDI: 0.1 M/S
TR MAX PG: 36 MMHG
TRICUSPID ANNULAR PLANE SYSTOLIC EXCURSION: 2.01 CM
TV REST PULMONARY ARTERY PRESSURE: 39 MMHG
WBC # BLD AUTO: 12.59 K/UL (ref 3.9–12.7)

## 2020-03-01 PROCEDURE — 36415 COLL VENOUS BLD VENIPUNCTURE: CPT

## 2020-03-01 PROCEDURE — 11000001 HC ACUTE MED/SURG PRIVATE ROOM

## 2020-03-01 PROCEDURE — 83735 ASSAY OF MAGNESIUM: CPT

## 2020-03-01 PROCEDURE — 99231 PR SUBSEQUENT HOSPITAL CARE,LEVL I: ICD-10-PCS | Mod: ,,, | Performed by: INTERNAL MEDICINE

## 2020-03-01 PROCEDURE — 80048 BASIC METABOLIC PNL TOTAL CA: CPT

## 2020-03-01 PROCEDURE — 84100 ASSAY OF PHOSPHORUS: CPT

## 2020-03-01 PROCEDURE — 25000003 PHARM REV CODE 250: Performed by: INTERNAL MEDICINE

## 2020-03-01 PROCEDURE — 85025 COMPLETE CBC W/AUTO DIFF WBC: CPT

## 2020-03-01 PROCEDURE — 63600175 PHARM REV CODE 636 W HCPCS: Performed by: INTERNAL MEDICINE

## 2020-03-01 PROCEDURE — 99231 SBSQ HOSP IP/OBS SF/LOW 25: CPT | Mod: ,,, | Performed by: INTERNAL MEDICINE

## 2020-03-01 RX ORDER — CEPHALEXIN 500 MG/1
500 CAPSULE ORAL EVERY 8 HOURS
Status: COMPLETED | OUTPATIENT
Start: 2020-03-01 | End: 2020-03-01

## 2020-03-01 RX ORDER — POTASSIUM CHLORIDE 20 MEQ/1
40 TABLET, EXTENDED RELEASE ORAL ONCE
Status: COMPLETED | OUTPATIENT
Start: 2020-03-01 | End: 2020-03-01

## 2020-03-01 RX ORDER — CEFTRIAXONE 1 G/1
1 INJECTION, POWDER, FOR SOLUTION INTRAMUSCULAR; INTRAVENOUS
Status: DISCONTINUED | OUTPATIENT
Start: 2020-03-01 | End: 2020-03-01

## 2020-03-01 RX ADMIN — CEPHALEXIN 500 MG: 500 CAPSULE ORAL at 09:03

## 2020-03-01 RX ADMIN — PANTOPRAZOLE SODIUM 40 MG: 40 TABLET, DELAYED RELEASE ORAL at 09:03

## 2020-03-01 RX ADMIN — AZITHROMYCIN MONOHYDRATE 500 MG: 250 TABLET ORAL at 09:03

## 2020-03-01 RX ADMIN — HYDROXYCHLOROQUINE SULFATE 200 MG: 200 TABLET, FILM COATED ORAL at 09:03

## 2020-03-01 RX ADMIN — ATORVASTATIN CALCIUM 40 MG: 20 TABLET, FILM COATED ORAL at 09:03

## 2020-03-01 RX ADMIN — ACETAMINOPHEN 650 MG: 325 TABLET ORAL at 03:03

## 2020-03-01 RX ADMIN — FUROSEMIDE 40 MG: 40 TABLET ORAL at 09:03

## 2020-03-01 RX ADMIN — ASPIRIN 81 MG: 81 TABLET, COATED ORAL at 09:03

## 2020-03-01 RX ADMIN — ENOXAPARIN SODIUM 40 MG: 100 INJECTION SUBCUTANEOUS at 05:03

## 2020-03-01 RX ADMIN — PREDNISONE 5 MG: 5 TABLET ORAL at 09:03

## 2020-03-01 RX ADMIN — GABAPENTIN 300 MG: 300 CAPSULE ORAL at 08:03

## 2020-03-01 RX ADMIN — POTASSIUM CHLORIDE 40 MEQ: 1500 TABLET, EXTENDED RELEASE ORAL at 09:03

## 2020-03-01 RX ADMIN — CEPHALEXIN 500 MG: 500 CAPSULE ORAL at 03:03

## 2020-03-01 RX ADMIN — CEPHALEXIN 500 MG: 500 CAPSULE ORAL at 08:03

## 2020-03-01 RX ADMIN — HYDROXYCHLOROQUINE SULFATE 200 MG: 200 TABLET, FILM COATED ORAL at 08:03

## 2020-03-01 NOTE — PLAN OF CARE
Problem: Fall Injury Risk  Goal: Absence of Fall and Fall-Related Injury  Outcome: Ongoing, Progressing  Intervention: Identify and Manage Contributors to Fall Injury Risk  Flowsheets (Taken 3/1/2020 0321)  Self-Care Promotion: independence encouraged; BADL personal objects within reach; BADL personal routines maintained  Medication Review/Management: medications reviewed     Problem: Adult Inpatient Plan of Care  Goal: Plan of Care Review  Outcome: Ongoing, Progressing  Flowsheets (Taken 3/1/2020 0321)  Plan of Care Reviewed With: patient  Goal: Optimal Comfort and Wellbeing  Outcome: Ongoing, Progressing  Intervention: Provide Person-Centered Care  Flowsheets (Taken 3/1/2020 0321)  Trust Relationship/Rapport: care explained; emotional support provided; thoughts/feelings acknowledged  POC reviewed with patient. All questions and concerns reviewed. Intermittent confusion noted at night. Fall/safety precautions implemented and maintained. Telesitter in progress d/t patient attempting to get OOB.  Purewick care provided. Blood glucose monitored. No acute events noted this shift. Please see flow sheet for full assessment and vitals. Bed locked in lowest position. Call bell within reach. Will continue to monitor.

## 2020-03-01 NOTE — PLAN OF CARE
Problem: Fall Injury Risk  Goal: Absence of Fall and Fall-Related Injury  Outcome: Ongoing, Progressing     Problem: Adult Inpatient Plan of Care  Goal: Plan of Care Review  Outcome: Ongoing, Progressing     Problem: Infection  Goal: Infection Symptom Resolution  Outcome: Ongoing, Progressing   Patient remains free from injury or fall . No neuro changes noted or reported. Intermittent confusion. TB test placed left forearm . Incentive spirometer provided and encouraged her to use it. Chest X ray completed.  Possible DC to SNF. Will continue to monitor.

## 2020-03-01 NOTE — PLAN OF CARE
Problem: Adult Inpatient Plan of Care  Goal: Readiness for Transition of Care  Outcome: Ongoing, Not Progressing     Problem: Adult Inpatient Plan of Care  Goal: Optimal Comfort and Wellbeing  Outcome: Ongoing, Not Progressing     Problem: Adult Inpatient Plan of Care  Goal: Absence of Hospital-Acquired Illness or Injury  Outcome: Ongoing, Not Progressing     Problem: Fall Injury Risk  Goal: Absence of Fall and Fall-Related Injury  Outcome: Ongoing, Progressing   Patient remains free from injury or fall. Echo completed today. No neuro changes note or reported from initial assessment. Blood glucose monitored. Plan- possible DC in AM.Will continue to monitor.

## 2020-03-01 NOTE — PROGRESS NOTES
[facility] Hospital Medicine                                                                     Progress Note     Team: [facility] HOSP MED G Gerber Shaw MD   Admit Date: 2/27/2020   Hospital Day: 3  LETICIA: 3/3/2020  Code status: Full Code   Principal Problem: Acute cystitis without hematuria     SUMMARY:     Selma Lux is a 84 y.o. female with hx of HFrEF, COPD, RA on plaquenil and cellcept, HTN, HPLD, TIA presenting for generalized weakness and occasionalmechanical falls for the past 2 weeks. 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 She states that her PT was commenting on how much weaker she had gotten as well. The patient does complain of not being able to do the things she would like to anymore and that she was probably going to see a psychiatrist soon. Patient is on valium, gabapentin, and baclofen. Per patients daughter she only takes valium and higher dose of gabapentin at night for sleep. Baclofen is only taken once a day now, per her PCP the last time she saw her in the clinic. In ED, CXR with infiltrates and UA concerning for UTI. Medicine called for admission.     Admitted to hospital medicine for acute metabolic encephalopathy suspected secondary to UTI and CAP. Obtained urine and started on BSabx. Unfortunately for blood cultures not obtained, ordered, NGTD. Urine with E Coli. Hypoglycemia noted, suspecting from infection and poor po intake. Since admission patient has gradually improved with supportive care. Changed IV abx to PO. PT OT rec SNF. Medically stable for discharge at this time.     SUBJECTIVE:     Pt was seen and examined at bedside. No acute events overnight. Intermittent  confusion at night. Pleasantly confused initially this AM after waking up but more alert after. Aox3. Unsure why patient was on oxygen this AM. Glucose wnl this AM. No new complaints other than generalized malaise. Glucose stable. Diet improving. No cp, dyspnea, cough or sputum endorsed. HDS and afebrile.     ROS (Positive in Bold, otherwise negative)  Pain Scale: 0 /10   Constitutional: fever, chills, night sweats, generalized weakness   CV: chest pain, edema, palpitations  Resp: SOB, cough, sputum production  GI: changes in appetite, NVDC, pain, melena, hematochezia, GERD, hematemesis  : Dysuria, hematuria, urinary urgency, frequency  MSK: arthralgia/myalgia, joint swelling  SKIN: rashes, pruritis, petechiae   Neuro/Psych: FND, anxiety, depression      OBJECTIVE:     Vitals:  Temp:  [97.6 °F (36.4 °C)-99.2 °F (37.3 °C)]   Pulse:  []   Resp:  [18-20]   BP: (117-132)/(55-68)   SpO2:  [93 %-96 %]      I & O (Last 24H):     Intake/Output Summary (Last 24 hours) at 3/1/2020 1626  Last data filed at 3/1/2020 0600  Gross per 24 hour   Intake 480 ml   Output 1700 ml   Net -1220 ml         GEN: AA female  in no acute distress. Nontoxic. Resting in bed. Cooperative. Calm.  HEENT: NCAT. PERRL. EOMI. Conjunctivae/corneas clear, sclera Anicteric.  CVS: RRR. Normal s1 s2 +murmur, no click, rub or gallop  LUNG: CTAB. Normal respiratory effort. No wheezes, rhonchi, or crackles.  ABD: Normoactive BS, soft, NT, ND, no masses or organomegaly.  EXT: trace LE edema. No cyanosis. Full ROM.  SKIN: color, texture, turgor normal. No rashes or lesions  NEURO: Alert, oriented x 3, intermittent confusion noted, grossly normal      All recent labs and imaging has been reviewed.     Recent Results (from the past 24 hour(s))   Procalcitonin    Collection Time: 02/29/20  6:48 PM   Result Value Ref Range    Procalcitonin 0.20 <0.25 ng/mL   POCT glucose    Collection Time: 02/29/20  9:59 PM   Result Value Ref Range    POCT Glucose 116 (H)  70 - 110 mg/dL   Magnesium    Collection Time: 03/01/20  3:39 AM   Result Value Ref Range    Magnesium 1.9 1.6 - 2.6 mg/dL   Phosphorus    Collection Time: 03/01/20  3:39 AM   Result Value Ref Range    Phosphorus 3.1 2.7 - 4.5 mg/dL   CBC with Automated Differential    Collection Time: 03/01/20  3:39 AM   Result Value Ref Range    WBC 12.59 3.90 - 12.70 K/uL    RBC 4.39 4.00 - 5.40 M/uL    Hemoglobin 11.4 (L) 12.0 - 16.0 g/dL    Hematocrit 37.9 37.0 - 48.5 %    Mean Corpuscular Volume 86 82 - 98 fL    Mean Corpuscular Hemoglobin 26.0 (L) 27.0 - 31.0 pg    Mean Corpuscular Hemoglobin Conc 30.1 (L) 32.0 - 36.0 g/dL    RDW 15.4 (H) 11.5 - 14.5 %    Platelets 319 150 - 350 K/uL    MPV 10.4 9.2 - 12.9 fL    Immature Granulocytes 0.3 0.0 - 0.5 %    Gran # (ANC) 6.8 1.8 - 7.7 K/uL    Immature Grans (Abs) 0.04 0.00 - 0.04 K/uL    Lymph # 1.7 1.0 - 4.8 K/uL    Mono # 3.6 (H) 0.3 - 1.0 K/uL    Eos # 0.4 0.0 - 0.5 K/uL    Baso # 0.04 0.00 - 0.20 K/uL    nRBC 0 0 /100 WBC    Gran% 54.1 38.0 - 73.0 %    Lymph% 13.1 (L) 18.0 - 48.0 %    Mono% 28.9 (H) 4.0 - 15.0 %    Eosinophil% 3.3 0.0 - 8.0 %    Basophil% 0.3 0.0 - 1.9 %    Platelet Estimate Appears normal     Aniso Slight     Poik Slight     Hypo Occasional     Ovalocytes Occasional     Differential Method Automated    Basic metabolic panel    Collection Time: 03/01/20  3:39 AM   Result Value Ref Range    Sodium 138 136 - 145 mmol/L    Potassium 3.6 3.5 - 5.1 mmol/L    Chloride 102 95 - 110 mmol/L    CO2 26 23 - 29 mmol/L    Glucose 95 70 - 110 mg/dL    BUN, Bld 10 8 - 23 mg/dL    Creatinine 1.1 0.5 - 1.4 mg/dL    Calcium 8.7 8.7 - 10.5 mg/dL    Anion Gap 10 8 - 16 mmol/L    eGFR if African American 54.0 (A) >60 mL/min/1.73 m^2    eGFR if non  46.9 (A) >60 mL/min/1.73 m^2   POCT glucose    Collection Time: 03/01/20  7:35 AM   Result Value Ref Range    POCT Glucose 93 70 - 110 mg/dL   Echo Color Flow Doppler? Yes    Collection Time: 03/01/20  8:45 AM   Result  Value Ref Range    AV mean gradient 7 mmHg    Ao peak theodore 1.71 m/s    Ao VTI 27.00 cm    IVRT 0.08 msec    IVS 1.10 0.6 - 1.1 cm    LA size 4.00 cm    Left Atrium Major Axis 5.01 cm    Left Atrium Minor Axis 5.20 cm    LVIDD 4.00 3.5 - 6.0 cm    LVIDS 2.88 2.1 - 4.0 cm    LVOT diameter 2.01 cm    LVOT peak VTI 22.91 cm    PW 1.12 (A) 0.6 - 1.1 cm    MV Peak A Theodore 1.29 m/s    E wave decelartion time 107.44 msec    MV Peak E Theodore 1.03 m/s    RA Major Axis 4.52 cm    RA Width 2.45 cm    RVDD 2.98 cm    Sinus 3.10 cm    TAPSE 2.01 cm    TR Max Theodore 3.02 m/s    TDI LATERAL 0.10 m/s    TDI SEPTAL 0.09 m/s    LA WIDTH 4.06 cm    LV Diastolic Volume 69.99 mL    LV Systolic Volume 31.70 mL    RV S' 23.49 cm/s    LVOT peak theodore 1.39 m/s    LV LATERAL E/E' RATIO 10.30 m/s    LV SEPTAL E/E' RATIO 11.44 m/s    FS 28 %    LA volume 70.45 cm3    LV mass 147.57 g    Left Ventricle Relative Wall Thickness 0.56 cm    AV valve area 2.69 cm2    AV Velocity Ratio 0.81     AV index (prosthetic) 0.85     E/A ratio 0.80     Mean e' 0.10 m/s    LVOT area 3.2 cm2    LVOT stroke volume 72.66 cm3    AV peak gradient 12 mmHg    E/E' ratio 10.84 m/s    LV Systolic Volume Index 16.7 mL/m2    LV Diastolic Volume Index 36.90 mL/m2    LA Volume Index 37.1 mL/m2    LV Mass Index 78 g/m2    Triscuspid Valve Regurgitation Peak Gradient 36 mmHg    BSA 1.95 m2    Right Atrial Pressure (from IVC) 3 mmHg    TV rest pulmonary artery pressure 39 mmHg   POCT glucose    Collection Time: 03/01/20 11:42 AM   Result Value Ref Range    POCT Glucose 160 (H) 70 - 110 mg/dL       Recent Labs   Lab 02/29/20  0823 02/29/20  1124 02/29/20  1530 02/29/20  2159 03/01/20  0735 03/01/20  1142   POCTGLUCOSE 105 142* 139* 116* 93 160*       Hemoglobin A1C   Date Value Ref Range Status   08/27/2019 5.6 4.0 - 5.6 % Final     Comment:     ADA Screening Guidelines:  5.7-6.4%  Consistent with prediabetes  >or=6.5%  Consistent with diabetes  High levels of fetal hemoglobin interfere  with the HbA1C  assay. Heterozygous hemoglobin variants (HbS, HgC, etc)do  not significantly interfere with this assay.   However, presence of multiple variants may affect accuracy.     03/13/2019 5.7 (H) 4.0 - 5.6 % Final     Comment:     ADA Screening Guidelines:  5.7-6.4%  Consistent with prediabetes  >or=6.5%  Consistent with diabetes  High levels of fetal hemoglobin interfere with the HbA1C  assay. Heterozygous hemoglobin variants (HbS, HgC, etc)do  not significantly interfere with this assay.   However, presence of multiple variants may affect accuracy.     08/24/2017 5.5 4.0 - 5.6 % Final     Comment:     According to ADA guidelines, hemoglobin A1c <7.0% represents  optimal control in non-pregnant diabetic patients. Different  metrics may apply to specific patient populations.   Standards of Medical Care in Diabetes-2016.  For the purpose of screening for the presence of diabetes:  <5.7%     Consistent with the absence of diabetes  5.7-6.4%  Consistent with increasing risk for diabetes   (prediabetes)  >or=6.5%  Consistent with diabetes  Currently, no consensus exists for use of hemoglobin A1c  for diagnosis of diabetes for children.  This Hemoglobin A1c assay has significant interference with fetal   hemoglobin   (HbF). The results are invalid for patients with abnormal amounts of   HbF,   including those with known Hereditary Persistence   of Fetal Hemoglobin. Heterozygous hemoglobin variants (HbAS, HbAC,   HbAD, HbAE, HbA2) do not significantly interfere with this assay;   however, presence of multiple variants in a sample may impact the %   interference.          Active Hospital Problems    Diagnosis  POA    *Acute cystitis without hematuria [N30.00]  Yes    Arthritis [M19.90]  Yes    Hyperlipidemia [E78.5]  Yes    AF (paroxysmal atrial fibrillation) [I48.0]  Yes    Generalized weakness [R53.1]  Yes    Hypertension, essential [I10]  Yes    Iron deficiency anemia secondary to inadequate dietary iron  intake [D50.8]  Yes      Resolved Hospital Problems   No resolved problems to display.          ASSESSMENT AND PLAN:     Acute cystitis  - symptoms concerning for UTI with altered mentation  - UA positive for bacteria and WBC, leuk and nitrates  - given fluids and rocephin  - urine cx with e coli, changed ceftriaxone to keflex, last day abx today  - bcx not taken in ED, ordered, NGTD   - afebrile and HDS     ?Community acquired pneumonia  - CXR with infiltrates however no fever or cough as per patient this AM  - started on azithro and rocephin for now, finished azithro, and on keflex as above  - she does not complain of dyspnea, cough or sputum  - questionable CAP vs atelectasis, IS provided   - continue inhalers   - no fevers and remains stable otherwise     Acute metabolic encephalopathy  - metabolic secondary to above, resolved  - she continued to have intermittent confusion at night, and early AM suspecting from underlying age/dementia  - Patient is on valium, gabapentin, and baclofen, likely contributed to this  - d/c home valium, decrease gabapentin dose, continue prn baclofen     Generalized weakness  Multiple falls at home  - secondary to above issues  - PT OT rec SNF  - CM SW updated, they have sent referrals    HFrEF  - last TTE in 8/2019 with 40% EF  - appears compensated at this time  - repeat TTE with EF 55% and grade 1 DD, normal CVP    HTN  - hold home amlodipine for now    RA  - hold cellcept for now due to infection  - continue home plaquenil and steroids  - resume cellcept tomorrow once off abx    HLD  - continue statin    Leukocytosis - resolved  - suspected reactive to above     Hypomagnesemia - resolved  - replete and recheck     Hypokalemia - resolved   - replete and recheck       Prophylaxis- lovenox  Code Status- Full Code   Discharge plan and follow up - d/c to SNF as per PT OT. She is medically stable at this time    Gerber Shaw MD  Hospital Medicine Staff  Pager [phone]

## 2020-03-02 PROBLEM — J96.01 ACUTE RESPIRATORY FAILURE WITH HYPOXIA: Status: ACTIVE | Noted: 2020-03-02

## 2020-03-02 LAB
25(OH)D3+25(OH)D2 SERPL-MCNC: 22 NG/ML (ref 30–96)
ADENOVIRUS: NOT DETECTED
ALLENS TEST: ABNORMAL
ANION GAP SERPL CALC-SCNC: 10 MMOL/L (ref 8–16)
ANISOCYTOSIS BLD QL SMEAR: SLIGHT
BACTERIA SPEC AEROBE CULT: NORMAL
BASOPHILS # BLD AUTO: 0.03 K/UL (ref 0–0.2)
BASOPHILS NFR BLD: 0.2 % (ref 0–1.9)
BORDETELLA PARAPERTUSSIS (IS1001): NOT DETECTED
BORDETELLA PERTUSSIS (PTXP): NOT DETECTED
BUN SERPL-MCNC: 11 MG/DL (ref 8–23)
CALCIUM SERPL-MCNC: 8.4 MG/DL (ref 8.7–10.5)
CHLAMYDIA PNEUMONIAE: NOT DETECTED
CHLORIDE SERPL-SCNC: 103 MMOL/L (ref 95–110)
CO2 SERPL-SCNC: 25 MMOL/L (ref 23–29)
CORONAVIRUS 229E, COMMON COLD VIRUS: NOT DETECTED
CORONAVIRUS HKU1, COMMON COLD VIRUS: NOT DETECTED
CORONAVIRUS NL63, COMMON COLD VIRUS: NOT DETECTED
CORONAVIRUS OC43, COMMON COLD VIRUS: NOT DETECTED
CREAT SERPL-MCNC: 1.1 MG/DL (ref 0.5–1.4)
DELSYS: ABNORMAL
DIFFERENTIAL METHOD: ABNORMAL
EOSINOPHIL # BLD AUTO: 0.5 K/UL (ref 0–0.5)
EOSINOPHIL NFR BLD: 3.3 % (ref 0–8)
ERYTHROCYTE [DISTWIDTH] IN BLOOD BY AUTOMATED COUNT: 15.7 % (ref 11.5–14.5)
EST. GFR  (AFRICAN AMERICAN): 54 ML/MIN/1.73 M^2
EST. GFR  (NON AFRICAN AMERICAN): 46.9 ML/MIN/1.73 M^2
FIO2: 30
FLOW: 3
FLUBV RNA NPH QL NAA+NON-PROBE: NOT DETECTED
GLUCOSE SERPL-MCNC: 84 MG/DL (ref 70–110)
GRAM STN SPEC: NORMAL
GRAM STN SPEC: NORMAL
HCO3 UR-SCNC: 25.8 MMOL/L (ref 24–28)
HCT VFR BLD AUTO: 36.3 % (ref 37–48.5)
HGB BLD-MCNC: 10.7 G/DL (ref 12–16)
HPIV1 RNA NPH QL NAA+NON-PROBE: NOT DETECTED
HPIV2 RNA NPH QL NAA+NON-PROBE: NOT DETECTED
HPIV3 RNA NPH QL NAA+NON-PROBE: NOT DETECTED
HPIV4 RNA NPH QL NAA+NON-PROBE: NOT DETECTED
HUMAN METAPNEUMOVIRUS: NOT DETECTED
HYPOCHROMIA BLD QL SMEAR: ABNORMAL
IMM GRANULOCYTES # BLD AUTO: 0.08 K/UL (ref 0–0.04)
IMM GRANULOCYTES NFR BLD AUTO: 0.5 % (ref 0–0.5)
INFLUENZA A (SUBTYPES H1,H1-2009,H3): NOT DETECTED
LYMPHOCYTES # BLD AUTO: 1.6 K/UL (ref 1–4.8)
LYMPHOCYTES NFR BLD: 10.9 % (ref 18–48)
MAGNESIUM SERPL-MCNC: 1.6 MG/DL (ref 1.6–2.6)
MCH RBC QN AUTO: 25.5 PG (ref 27–31)
MCHC RBC AUTO-ENTMCNC: 29.5 G/DL (ref 32–36)
MCV RBC AUTO: 87 FL (ref 82–98)
MODE: ABNORMAL
MONOCYTES # BLD AUTO: 4.3 K/UL (ref 0.3–1)
MONOCYTES NFR BLD: 28.3 % (ref 4–15)
MYCOPLASMA PNEUMONIAE: NOT DETECTED
NEUTROPHILS # BLD AUTO: 8.5 K/UL (ref 1.8–7.7)
NEUTROPHILS NFR BLD: 56.8 % (ref 38–73)
NRBC BLD-RTO: 0 /100 WBC
OVALOCYTES BLD QL SMEAR: ABNORMAL
PCO2 BLDA: 33.8 MMHG (ref 35–45)
PH SMN: 7.49 [PH] (ref 7.35–7.45)
PHOSPHATE SERPL-MCNC: 3.6 MG/DL (ref 2.7–4.5)
PLATELET # BLD AUTO: 314 K/UL (ref 150–350)
PLATELET BLD QL SMEAR: ABNORMAL
PMV BLD AUTO: 10.4 FL (ref 9.2–12.9)
PO2 BLDA: 69 MMHG (ref 80–100)
POC BE: 2 MMOL/L
POC SATURATED O2: 95 % (ref 95–100)
POC TCO2: 27 MMOL/L (ref 23–27)
POCT GLUCOSE: 117 MG/DL (ref 70–110)
POIKILOCYTOSIS BLD QL SMEAR: SLIGHT
POLYCHROMASIA BLD QL SMEAR: ABNORMAL
POTASSIUM SERPL-SCNC: 3.9 MMOL/L (ref 3.5–5.1)
RBC # BLD AUTO: 4.19 M/UL (ref 4–5.4)
RESPIRATORY INFECTION PANEL SOURCE: NORMAL
RSV RNA NPH QL NAA+NON-PROBE: NOT DETECTED
RV+EV RNA NPH QL NAA+NON-PROBE: NOT DETECTED
SAMPLE: ABNORMAL
SITE: ABNORMAL
SODIUM SERPL-SCNC: 138 MMOL/L (ref 136–145)
SP02: 95
WBC # BLD AUTO: 15.04 K/UL (ref 3.9–12.7)

## 2020-03-02 PROCEDURE — 11000001 HC ACUTE MED/SURG PRIVATE ROOM

## 2020-03-02 PROCEDURE — 97112 NEUROMUSCULAR REEDUCATION: CPT

## 2020-03-02 PROCEDURE — 87798 DETECT AGENT NOS DNA AMP: CPT

## 2020-03-02 PROCEDURE — 36415 COLL VENOUS BLD VENIPUNCTURE: CPT

## 2020-03-02 PROCEDURE — 27000190 HC CPAP FULL FACE MASK W/VALVE

## 2020-03-02 PROCEDURE — 27000221 HC OXYGEN, UP TO 24 HOURS

## 2020-03-02 PROCEDURE — 82803 BLOOD GASES ANY COMBINATION: CPT

## 2020-03-02 PROCEDURE — 85025 COMPLETE CBC W/AUTO DIFF WBC: CPT

## 2020-03-02 PROCEDURE — 63600175 PHARM REV CODE 636 W HCPCS: Performed by: INTERNAL MEDICINE

## 2020-03-02 PROCEDURE — 99233 SBSQ HOSP IP/OBS HIGH 50: CPT | Mod: ,,, | Performed by: INTERNAL MEDICINE

## 2020-03-02 PROCEDURE — 83735 ASSAY OF MAGNESIUM: CPT

## 2020-03-02 PROCEDURE — 94761 N-INVAS EAR/PLS OXIMETRY MLT: CPT

## 2020-03-02 PROCEDURE — 87070 CULTURE OTHR SPECIMN AEROBIC: CPT

## 2020-03-02 PROCEDURE — 63600175 PHARM REV CODE 636 W HCPCS: Performed by: NURSE PRACTITIONER

## 2020-03-02 PROCEDURE — 99900035 HC TECH TIME PER 15 MIN (STAT)

## 2020-03-02 PROCEDURE — 25000242 PHARM REV CODE 250 ALT 637 W/ HCPCS: Performed by: INTERNAL MEDICINE

## 2020-03-02 PROCEDURE — 94660 CPAP INITIATION&MGMT: CPT

## 2020-03-02 PROCEDURE — 25000003 PHARM REV CODE 250: Performed by: INTERNAL MEDICINE

## 2020-03-02 PROCEDURE — 80048 BASIC METABOLIC PNL TOTAL CA: CPT

## 2020-03-02 PROCEDURE — 87205 SMEAR GRAM STAIN: CPT

## 2020-03-02 PROCEDURE — 25500020 PHARM REV CODE 255: Performed by: INTERNAL MEDICINE

## 2020-03-02 PROCEDURE — 94640 AIRWAY INHALATION TREATMENT: CPT

## 2020-03-02 PROCEDURE — 84100 ASSAY OF PHOSPHORUS: CPT

## 2020-03-02 PROCEDURE — 36600 WITHDRAWAL OF ARTERIAL BLOOD: CPT

## 2020-03-02 PROCEDURE — 97530 THERAPEUTIC ACTIVITIES: CPT

## 2020-03-02 PROCEDURE — 82306 VITAMIN D 25 HYDROXY: CPT

## 2020-03-02 PROCEDURE — 99233 PR SUBSEQUENT HOSPITAL CARE,LEVL III: ICD-10-PCS | Mod: ,,, | Performed by: INTERNAL MEDICINE

## 2020-03-02 RX ORDER — IPRATROPIUM BROMIDE AND ALBUTEROL SULFATE 2.5; .5 MG/3ML; MG/3ML
3 SOLUTION RESPIRATORY (INHALATION)
Status: DISCONTINUED | OUTPATIENT
Start: 2020-03-02 | End: 2020-03-04

## 2020-03-02 RX ORDER — DICYCLOMINE HYDROCHLORIDE 10 MG/1
10 CAPSULE ORAL 4 TIMES DAILY PRN
Status: DISCONTINUED | OUTPATIENT
Start: 2020-03-02 | End: 2020-03-10 | Stop reason: HOSPADM

## 2020-03-02 RX ORDER — CEFTRIAXONE 1 G/1
1 INJECTION, POWDER, FOR SOLUTION INTRAMUSCULAR; INTRAVENOUS
Status: DISCONTINUED | OUTPATIENT
Start: 2020-03-02 | End: 2020-03-02

## 2020-03-02 RX ORDER — ERGOCALCIFEROL 1.25 MG/1
50000 CAPSULE ORAL
Status: DISCONTINUED | OUTPATIENT
Start: 2020-03-02 | End: 2020-03-10 | Stop reason: HOSPADM

## 2020-03-02 RX ORDER — CEFEPIME HYDROCHLORIDE 2 G/1
2 INJECTION, POWDER, FOR SOLUTION INTRAVENOUS
Status: DISCONTINUED | OUTPATIENT
Start: 2020-03-02 | End: 2020-03-07

## 2020-03-02 RX ORDER — SODIUM CHLORIDE, SODIUM LACTATE, POTASSIUM CHLORIDE, CALCIUM CHLORIDE 600; 310; 30; 20 MG/100ML; MG/100ML; MG/100ML; MG/100ML
INJECTION, SOLUTION INTRAVENOUS ONCE
Status: COMPLETED | OUTPATIENT
Start: 2020-03-02 | End: 2020-03-02

## 2020-03-02 RX ORDER — AMOXICILLIN AND CLAVULANATE POTASSIUM 875; 125 MG/1; MG/1
1 TABLET, FILM COATED ORAL EVERY 12 HOURS
Status: DISCONTINUED | OUTPATIENT
Start: 2020-03-02 | End: 2020-03-02

## 2020-03-02 RX ORDER — HYDROXYCHLOROQUINE SULFATE 200 MG/1
200 TABLET, FILM COATED ORAL DAILY
Status: DISCONTINUED | OUTPATIENT
Start: 2020-03-03 | End: 2020-03-10 | Stop reason: HOSPADM

## 2020-03-02 RX ADMIN — ERGOCALCIFEROL 50000 UNITS: 1.25 CAPSULE ORAL at 10:03

## 2020-03-02 RX ADMIN — IPRATROPIUM BROMIDE AND ALBUTEROL SULFATE 3 ML: .5; 3 SOLUTION RESPIRATORY (INHALATION) at 03:03

## 2020-03-02 RX ADMIN — FUROSEMIDE 40 MG: 40 TABLET ORAL at 10:03

## 2020-03-02 RX ADMIN — CEFTRIAXONE SODIUM 1 G: 1 INJECTION, POWDER, FOR SOLUTION INTRAMUSCULAR; INTRAVENOUS at 05:03

## 2020-03-02 RX ADMIN — PANTOPRAZOLE SODIUM 40 MG: 40 TABLET, DELAYED RELEASE ORAL at 10:03

## 2020-03-02 RX ADMIN — ACETAMINOPHEN 650 MG: 325 TABLET ORAL at 03:03

## 2020-03-02 RX ADMIN — ASPIRIN 81 MG: 81 TABLET, COATED ORAL at 10:03

## 2020-03-02 RX ADMIN — IPRATROPIUM BROMIDE AND ALBUTEROL SULFATE 3 ML: .5; 3 SOLUTION RESPIRATORY (INHALATION) at 07:03

## 2020-03-02 RX ADMIN — ATORVASTATIN CALCIUM 40 MG: 20 TABLET, FILM COATED ORAL at 10:03

## 2020-03-02 RX ADMIN — ENOXAPARIN SODIUM 40 MG: 100 INJECTION SUBCUTANEOUS at 04:03

## 2020-03-02 RX ADMIN — IOHEXOL 75 ML: 350 INJECTION, SOLUTION INTRAVENOUS at 02:03

## 2020-03-02 RX ADMIN — VANCOMYCIN HYDROCHLORIDE 1250 MG: 1.25 INJECTION, POWDER, LYOPHILIZED, FOR SOLUTION INTRAVENOUS at 07:03

## 2020-03-02 RX ADMIN — CEFEPIME 2 G: 2 INJECTION, POWDER, FOR SOLUTION INTRAVENOUS at 07:03

## 2020-03-02 RX ADMIN — SODIUM CHLORIDE, SODIUM LACTATE, POTASSIUM CHLORIDE, AND CALCIUM CHLORIDE: .6; .31; .03; .02 INJECTION, SOLUTION INTRAVENOUS at 10:03

## 2020-03-02 RX ADMIN — HYDROXYCHLOROQUINE SULFATE 200 MG: 200 TABLET, FILM COATED ORAL at 10:03

## 2020-03-02 RX ADMIN — PREDNISONE 5 MG: 5 TABLET ORAL at 10:03

## 2020-03-02 NOTE — PROGRESS NOTES
[facility] Hospital Medicine                                                                     Progress Note     Team: [facility] HOSP MED G Gerber Shaw MD   Admit Date: 2/27/2020   Hospital Day: 4  LETICIA: 3/3/2020  Code status: Full Code   Principal Problem: Acute cystitis without hematuria     SUMMARY:     Selma Lux is a 84 y.o. 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 She states that her PT was commenting on how much weaker she had gotten as well. The patient does complain of not being able to do the things she would like to anymore and that she was probably going to see a psychiatrist soon. Patient is on valium, gabapentin, and baclofen. Per patients daughter she only takes valium and higher dose of gabapentin at night for sleep. Baclofen is only taken once a day now, per her PCP the last time she saw her in the clinic. In ED, CXR with infiltrates and UA concerning for UTI. Medicine called for admission.     Admitted to hospital medicine for acute metabolic encephalopathy suspected secondary to UTI and CAP. Obtained urine and started on BSabx. Unfortunately for blood cultures not obtained, ordered, NGTD. Urine with E Coli. Hypoglycemia noted, suspecting from infection and poor po intake. Since admission patient has gradually improved with supportive care. Changed IV abx to PO. PT OT rec SNF.  Unfortunately 3/ morning patient was found to be hypoxic Sp02 88% and lethargic. Stat ABG and CTA ordered. No piror hx of FREEDOM. Respiratory panel negative. ABG with respiratory alkalosis. CTA with no PE but noting worsening consolidations, will consult pulmonary. Restarted on ceftriaxone.       SUBJECTIVE:     Pt was seen and examined at bedside. This Am patient was lethargic saturating 88% on RA. Tm 100.5. Stat ABG reviewed noting respiratory alkalosis and pO2 69 on 3L NC. STAT CTA ordered. When I saw this morning she was easily arousable and Axo2. Complaining of mild abdominal pain, which is a chronic issue. Daughter at bedside updated. No piror hx of FREEDOM. Respiratory panel negative. CTA with no PE but noting worsening consolidations, will consult pulmonary. Restarted on ceftriaxone. She has no cough.      ROS (Positive in Bold, otherwise negative)  Pain Scale: 0 /10   Constitutional: fever, chills, night sweats, generalized weakness   CV: chest pain, edema, palpitations  Resp: SOB, cough, sputum production  GI: changes in appetite, NVDC, pain, melena, hematochezia, GERD, hematemesis  : Dysuria, hematuria, urinary urgency, frequency  MSK: arthralgia/myalgia, joint swelling  SKIN: rashes, pruritis, petechiae   Neuro/Psych: FND, anxiety, depression      OBJECTIVE:     Vitals:  Temp:  [98 °F (36.7 °C)-100.5 °F (38.1 °C)]   Pulse:  []   Resp:  [16-21]   BP: ()/(52-61)   SpO2:  [88 %-96 %]      I & O (Last 24H):     Intake/Output Summary (Last 24 hours) at 3/2/2020 1358  Last data filed at 3/2/2020 1100  Gross per 24 hour   Intake 840 ml   Output --   Net 840 ml         GEN: AA female  in no acute distress. Lethargic. Resting in bed. Cooperative. Calm.  HEENT: NCAT. PERRL. EOMI. Conjunctivae/corneas clear, sclera Anicteric.  CVS: RRR. Normal s1 s2 +murmur, no click, rub or gallop  LUNL CTAB. Normal respiratory effort. No wheezes or rhonchi. Bibasilar Crackles L>R  ABD: Normoactive BS, soft, NT, ND, no  masses or organomegaly.  EXT: trace LE edema. No cyanosis. Full ROM.  SKIN: color, texture, turgor normal. No rashes or lesions  NEURO: Alert, oriented x 2, intermittent confusion noted, grossly normal      All recent labs and imaging has been reviewed.     Recent Results (from the past 24 hour(s))   POCT glucose    Collection Time: 03/01/20  5:29 PM   Result Value Ref Range    POCT Glucose 146 (H) 70 - 110 mg/dL   POCT glucose    Collection Time: 03/01/20  9:47 PM   Result Value Ref Range    POCT Glucose 111 (H) 70 - 110 mg/dL   Magnesium    Collection Time: 03/02/20  4:39 AM   Result Value Ref Range    Magnesium 1.6 1.6 - 2.6 mg/dL   Phosphorus    Collection Time: 03/02/20  4:39 AM   Result Value Ref Range    Phosphorus 3.6 2.7 - 4.5 mg/dL   Basic metabolic panel    Collection Time: 03/02/20  4:39 AM   Result Value Ref Range    Sodium 138 136 - 145 mmol/L    Potassium 3.9 3.5 - 5.1 mmol/L    Chloride 103 95 - 110 mmol/L    CO2 25 23 - 29 mmol/L    Glucose 84 70 - 110 mg/dL    BUN, Bld 11 8 - 23 mg/dL    Creatinine 1.1 0.5 - 1.4 mg/dL    Calcium 8.4 (L) 8.7 - 10.5 mg/dL    Anion Gap 10 8 - 16 mmol/L    eGFR if African American 54.0 (A) >60 mL/min/1.73 m^2    eGFR if non  46.9 (A) >60 mL/min/1.73 m^2   Vitamin D    Collection Time: 03/02/20  4:39 AM   Result Value Ref Range    Vit D, 25-Hydroxy 22 (L) 30 - 96 ng/mL   Culture, Respiratory with Gram Stain    Collection Time: 03/02/20  5:54 AM   Result Value Ref Range    Respiratory Culture Specimen inadequate - culture not performed     Gram Stain (Respiratory) >10epis/lfp and <than many WBC's     Gram Stain (Respiratory)       Predominance of oropharyngeal carlos. Please recollect.   CBC auto differential    Collection Time: 03/02/20  8:33 AM   Result Value Ref Range    WBC 15.04 (H) 3.90 - 12.70 K/uL    RBC 4.19 4.00 - 5.40 M/uL    Hemoglobin 10.7 (L) 12.0 - 16.0 g/dL    Hematocrit 36.3 (L) 37.0 - 48.5 %    Mean Corpuscular Volume 87 82 - 98 fL     Mean Corpuscular Hemoglobin 25.5 (L) 27.0 - 31.0 pg    Mean Corpuscular Hemoglobin Conc 29.5 (L) 32.0 - 36.0 g/dL    RDW 15.7 (H) 11.5 - 14.5 %    Platelets 314 150 - 350 K/uL    MPV 10.4 9.2 - 12.9 fL    Immature Granulocytes 0.5 0.0 - 0.5 %    Gran # (ANC) 8.5 (H) 1.8 - 7.7 K/uL    Immature Grans (Abs) 0.08 (H) 0.00 - 0.04 K/uL    Lymph # 1.6 1.0 - 4.8 K/uL    Mono # 4.3 (H) 0.3 - 1.0 K/uL    Eos # 0.5 0.0 - 0.5 K/uL    Baso # 0.03 0.00 - 0.20 K/uL    nRBC 0 0 /100 WBC    Gran% 56.8 38.0 - 73.0 %    Lymph% 10.9 (L) 18.0 - 48.0 %    Mono% 28.3 (H) 4.0 - 15.0 %    Eosinophil% 3.3 0.0 - 8.0 %    Basophil% 0.2 0.0 - 1.9 %    Platelet Estimate Appears normal     Aniso Slight     Poik Slight     Poly Occasional     Hypo Occasional     Ovalocytes Occasional     Differential Method Automated    ISTAT PROCEDURE    Collection Time: 03/02/20  8:54 AM   Result Value Ref Range    POC PH 7.490 (H) 7.35 - 7.45    POC PCO2 33.8 (L) 35 - 45 mmHg    POC PO2 69 (L) 80 - 100 mmHg    POC HCO3 25.8 24 - 28 mmol/L    POC BE 2 -2 to 2 mmol/L    POC SATURATED O2 95 95 - 100 %    POC TCO2 27 23 - 27 mmol/L    Sample ARTERIAL     Site LR     Allens Test Pass     DelSys Nasal Can     Mode SPONT     Flow 3     FiO2 30     Sp02 95    Respiratory Infection Panel, Nasopharyngeal    Collection Time: 03/02/20  9:10 AM   Result Value Ref Range    Respiratory Infection Panel Source NP Swab Not Detected    Adenovirus Not Detected Not Detected    Coronavirus 229E, Common Cold Virus Not Detected Not Detected    Coronavirus HKU1, Common Cold Virus Not Detected Not Detected    Coronavirus NL63, Common Cold Virus Not Detected Not Detected    Coronavirus OC43, Common Cold Virus Not Detected Not Detected    Human Metapneumovirus Not Detected Not Detected    Human Rhinovirus/Enterovirus Not Detected Not Detected    Influenza A (subtypes H1, H1-2009,H3) Not Detected Not Detected    Influenza B Not Detected Not Detected    Parainfluenza Virus 1 Not Detected  Not Detected    Parainfluenza Virus 2 Not Detected Not Detected    Parainfluenza Virus 3 Not Detected Not Detected    Parainfluenza Virus 4 Not Detected Not Detected    Respiratory Syncytial Virus Not Detected Not Detected    Bordetella Parapertussis (VA3287) Not Detected Not Detected    Bordetella pertussis (ptxP) Not Detected Not Detected    Chlamydia pneumoniae Not Detected Not Detected    Mycoplasma pneumoniae Not Detected Not Detected   POCT glucose    Collection Time: 03/02/20 10:28 AM   Result Value Ref Range    POCT Glucose 117 (H) 70 - 110 mg/dL       Recent Labs   Lab 02/29/20  2159 03/01/20  0735 03/01/20  1142 03/01/20  1729 03/01/20  2147 03/02/20  1028   POCTGLUCOSE 116* 93 160* 146* 111* 117*       Hemoglobin A1C   Date Value Ref Range Status   08/27/2019 5.6 4.0 - 5.6 % Final     Comment:     ADA Screening Guidelines:  5.7-6.4%  Consistent with prediabetes  >or=6.5%  Consistent with diabetes  High levels of fetal hemoglobin interfere with the HbA1C  assay. Heterozygous hemoglobin variants (HbS, HgC, etc)do  not significantly interfere with this assay.   However, presence of multiple variants may affect accuracy.     03/13/2019 5.7 (H) 4.0 - 5.6 % Final     Comment:     ADA Screening Guidelines:  5.7-6.4%  Consistent with prediabetes  >or=6.5%  Consistent with diabetes  High levels of fetal hemoglobin interfere with the HbA1C  assay. Heterozygous hemoglobin variants (HbS, HgC, etc)do  not significantly interfere with this assay.   However, presence of multiple variants may affect accuracy.     08/24/2017 5.5 4.0 - 5.6 % Final     Comment:     According to ADA guidelines, hemoglobin A1c <7.0% represents  optimal control in non-pregnant diabetic patients. Different  metrics may apply to specific patient populations.   Standards of Medical Care in Diabetes-2016.  For the purpose of screening for the presence of diabetes:  <5.7%     Consistent with the absence of diabetes  5.7-6.4%  Consistent with  increasing risk for diabetes   (prediabetes)  >or=6.5%  Consistent with diabetes  Currently, no consensus exists for use of hemoglobin A1c  for diagnosis of diabetes for children.  This Hemoglobin A1c assay has significant interference with fetal   hemoglobin   (HbF). The results are invalid for patients with abnormal amounts of   HbF,   including those with known Hereditary Persistence   of Fetal Hemoglobin. Heterozygous hemoglobin variants (HbAS, HbAC,   HbAD, HbAE, HbA2) do not significantly interfere with this assay;   however, presence of multiple variants in a sample may impact the %   interference.          Active Hospital Problems    Diagnosis  POA    *Acute cystitis without hematuria [N30.00]  Yes    Arthritis [M19.90]  Yes    Hyperlipidemia [E78.5]  Yes    AF (paroxysmal atrial fibrillation) [I48.0]  Yes    Generalized weakness [R53.1]  Yes    Hypertension, essential [I10]  Yes    Iron deficiency anemia secondary to inadequate dietary iron intake [D50.8]  Yes      Resolved Hospital Problems   No resolved problems to display.          ASSESSMENT AND PLAN:     Acute hypoxic respiratory failure  Community acquired pneumonia  Cryptogenic organizing pneumonia on chronic prednisone  - On admit - CXR with bibasilar infiltrates however no fever or cough  - started on azithro and rocephin which was transitioned to keflex for UTI as patient didn't have any fever, dyspnea or cough  - 3/2 morning patient was found to be hypoxic Sp02 88% and lethargic.   - Stat ABG and CTA ordered. ABG with respiratory alkalosis  - CTA with worsening consolidations, restarted ceftriaxone for now, and will consult pulmonary for therapy guidance   - no hx of FREEDOM  - Duonebs scheduled and supplemental oxygen PRN  - IS provided  - Continue home prednisone for now    Acute cystitis - resolved  - symptoms concerning for UTI with altered mentation  - UA positive for bacteria and WBC, leuk and nitrates  - given fluids and rocephin  -  urine cx with e coli, changed ceftriaxone to keflex - finished tx  - bcx not taken in ED, ordered, NGTD   - afebrile and HDS    Hx of vascular dementia   Acute metabolic encephalopathy  - metabolic secondary to above, resolved  - she continued to have intermittent confusion at night, and early AM suspecting from underlying age/dementia  - Patient is on valium, gabapentin, and baclofen, likely contributed to this  - d/c home valium, decrease gabapentin dose, continue prn baclofen     Generalized weakness  Multiple falls at home  - secondary to above issues  - PT OT rec SNF  - CM SW updated, they have sent referrals    HFrEF  - last TTE in 8/2019 with 40% EF  - appears compensated at this time  - repeat TTE with EF 55% and grade 1 DD, normal CVP    HTN  - hold home amlodipine for now    RA  - hold cellcept for now due to infection  - continue home plaquenil and steroids  - resume cellcept tomorrow once off abx    HLD  - continue statin    Leukocytosis - resolved  - suspected reactive to above     Hypomagnesemia - resolved  - replete and recheck     Hypokalemia - resolved   - replete and recheck     Pulmonary HTN secondary to ILD  - noted    Long QT interval   - noted      Prophylaxis- lovenox  Code Status- Full Code   Discharge plan and follow up - d/c to SNF as per PT OT once medically stable    Gerber Shaw MD  Hospital Medicine Staff  Pager [phone]

## 2020-03-02 NOTE — NURSING
Pt responding to pain, unable to answer orientation questions. Pupils 2, brisk, equal, and reactive. BP 98/53, Map 73, Pulse 103, SpO2 88, Axillary temp 100.5. 2 L NC applied, SpO2 at 92. Notified MD. Verbal order for STAT ABG and to collect respiratory infection panel. WCTM.

## 2020-03-02 NOTE — PLAN OF CARE
Problem: Fall Injury Risk  Goal: Absence of Fall and Fall-Related Injury  Outcome: Ongoing, Progressing  Intervention: Identify and Manage Contributors to Fall Injury Risk  Flowsheets (Taken 3/2/2020 0325)  Self-Care Promotion: independence encouraged; BADL personal objects within reach; BADL personal routines maintained  Medication Review/Management: medications reviewed     Problem: Adult Inpatient Plan of Care  Goal: Plan of Care Review  Outcome: Ongoing, Progressing  Flowsheets (Taken 3/2/2020 0325)  Plan of Care Reviewed With: patient; daughter  Goal: Optimal Comfort and Wellbeing  Outcome: Ongoing, Progressing  Intervention: Provide Person-Centered Care  Flowsheets (Taken 3/2/2020 0325)  Trust Relationship/Rapport: care explained; thoughts/feelings acknowledged; reassurance provided    POC reviewed with patient and daughter. All questions and concerns reviewed. Fall/safety precautions implemented and maintained. Purewick care provided. Blood glucose monitored. No acute events noted this shift. Telesitter in progress. Daughter at BS. Please see flow sheet for full assessment and vitals. Bed locked in lowest position. Call bell within reach. Will continue to monitor.

## 2020-03-02 NOTE — PLAN OF CARE
POC reviewed with pt and family at 1649. Pt drowsy but able to verbalize understanding. Bipap at [facility]. CT of chest completed. Nasopharyngeal respiratory panel completed. STAT ABG completed. Respiratory culture with gram stain needs to be recollected per lab request. Questions and concerns addressed with family. Pt progressing toward goals. Will continue to monitor. See flowsheets for full assessment and VS info.

## 2020-03-02 NOTE — NURSING
Verbal order from MD Valeria that it is not necessary to recollect respiratory culture with gram stain.

## 2020-03-02 NOTE — PLAN OF CARE
Problem: Physical Therapy Goal  Goal: Physical Therapy Goal  Description  Goals to be met by: 3/13/2020     Patient will increase functional independence with mobility by performin. Supine to sit with Contact Guard Assistance  2. Sit to supine with Contact Guard Assistance  3. Sit to stand transfer with Minimal Assistance using Rolling Walker or LRAD  4. Bed to chair transfer with Minimal Assistance using Rolling Walker or LRAD  5. Gait  x 25 feet with Contact Guard Assistance using Rolling Walker.   6. Sitting at edge of bed x10 minutes with Stand-by Assistance  7. Lower extremity exercise program x20 reps per handout, with independence     Goals remain appropriate. Continue with Physical therapy Plan of Care. Destinee Lam, PT 3/2/2020

## 2020-03-02 NOTE — PROGRESS NOTES
Rapid Response Nurse Chart Check     Chart check completed, abnormal VS noted. Bedside RNRatna contacted,  Primary RN awaiting call back from primary team to suggest abdominal CT while getting chest CT later this afternoon, for abdominal pain. RN states pt is still lethargic but more responsive than previously. no concerns verbalized at this time, instructed to call [phone] for further concerns or assistance.

## 2020-03-02 NOTE — PT/OT/SLP PROGRESS
Physical Therapy Treatment    Patient Name:  Selma Lux   MRN:  [id number]    Recommendations:     Discharge Recommendations:  nursing facility, skilled   Discharge Equipment Recommendations: other (see comments)(tbd pending progress)   Barriers to discharge: Decreased caregiver support    Assessment:     Selma Lux is a 84 y.o. female admitted with a medical diagnosis of Acute cystitis without hematuria.  She presents with the following impairments/functional limitations:  weakness, impaired endurance, impaired balance, gait instability, impaired functional mobilty, pain, decreased lower extremity function, decreased upper extremity function, impaired self care skills . Patient lethargic today and decreased tolerance for activity. Dtr present and reports patient did transfer to the chair over the weekend w/ assistance of nursing. Patient sits EOB 12 minutes w/ CG/min assist. Stands from EOB x2 trials w/ min assist of 2, forward flexed posture and stands w/ min/mod assistance of 2 approx 20 seconds each trial . Patient will benefit from continued physical therapy to address deficits and improve safety and functional mobility. Continue with physical therapy plan of care. Patient will benefit from continued therapy in skilled nursing facility to improve safety a functional mobilty and progress to safe discharge.    Rehab Prognosis: Good; patient would benefit from acute skilled PT services to address these deficits and reach maximum level of function.    Recent Surgery: * No surgery found *      Plan:     During this hospitalization, patient to be seen 4 x/week to address the identified rehab impairments via gait training, therapeutic activities, therapeutic exercises, neuromuscular re-education and progress toward the following goals:    · Plan of Care Expires:  03/28/20    Subjective     Chief Complaint: stomach ache  Patient/Family Comments/goals: Dtr reports eager for patient to get out of the  hospital and to rehab.  Pain/Comfort:  · Pain Rating 1: other (see comments)(not rated, c/o stomach pain)  · Pain Addressed 1: Distraction, Reposition, Cessation of Activity, Nurse notified(intermittently c/o stomach pain; )  · Pain Rating Post-Intervention 1: 0/10      Objective:     Communicated with nurse prior to session.  Patient found HOB elevated with telemetry upon PT entry to room.     General Precautions: Standard, fall   Orthopedic Precautions:N/A   Braces:       Functional Mobility:  · Bed Mobility:     · Supine to Sit: maximal assistance and with HOB elevated  · Sit to Supine: maximal assistance and of 2 persons  · Transfers:     · Sit to Stand:  minimum assistance and of 2 persons with no AD  · Gait: unable to stand or step due to lethargy.      AM-PAC 6 CLICK MOBILITY  Turning over in bed (including adjusting bedclothes, sheets and blankets)?: 2  Sitting down on and standing up from a chair with arms (e.g., wheelchair, bedside commode, etc.): 2  Moving from lying on back to sitting on the side of the bed?: 2  Moving to and from a bed to a chair (including a wheelchair)?: 2  Need to walk in hospital room?: 1  Climbing 3-5 steps with a railing?: 1  Basic Mobility Total Score: 10       Therapeutic Activities and Exercises:   Patient performs 10x BLE exercises AAROM-hip and knee flexion, ankle PF/DF. Patient performs QS x5 w/ cues and assistance.  Sitting EOB , performs partial range LAQ w/ assistance and min assistance for sitting balance. Cues for attention, keep eyes open.  Patient sits EOB 12 minutes, facilitation for improved alignment, posture, anterior pelvic tilt.  Attempted one stand w/ RW and patient did not participate.   Patient then able to stand from EOB w/o AD w/ min assistance of 2 w/ PT assisting in front at trunk and for foot position and second person assisting at side. 2 trials. Standing approx 20 seconds each trial w/ min/mod assistance to maintain standing. Patient stands w/ forward  flexed posture and downward gaze.   Nurse arrives and reports patient will be going for a test. Patient TA to change gown sitting EOB.     Patient left HOB elevated with all lines intact, call button in reach, nurse notified and nurse and daughter present..    GOALS:   Multidisciplinary Problems     Physical Therapy Goals        Problem: Physical Therapy Goal    Goal Priority Disciplines Outcome Goal Variances Interventions   Physical Therapy Goal     PT, PT/OT Ongoing, Progressing     Description:  Goals to be met by: 3/13/2020     Patient will increase functional independence with mobility by performin. Supine to sit with Contact Guard Assistance  2. Sit to supine with Contact Guard Assistance  3. Sit to stand transfer with Minimal Assistance using Rolling Walker or LRAD  4. Bed to chair transfer with Minimal Assistance using Rolling Walker or LRAD  5. Gait  x 25 feet with Contact Guard Assistance using Rolling Walker.   6. Sitting at edge of bed x10 minutes with Stand-by Assistance  7. Lower extremity exercise program x20 reps per handout, with independence                      Time Tracking:     PT Received On: 20  PT Start Time: 0952     PT Stop Time: 1015  PT Total Time (min): 23 min     Billable Minutes: Therapeutic Activity 15 and Neuromuscular Re-education 8    Treatment Type: Treatment  PT/PTA: PT     PTA Visit Number: 0     Destinee Lam, PT  2020

## 2020-03-02 NOTE — PT/OT/SLP PROGRESS
Occupational Therapy      Patient Name:  Selma Lux   MRN:  [id number]    Patient not seen today secondary to CT/MRI. Will follow-up tomorrow.    AYAD Ospina  3/2/2020

## 2020-03-03 LAB
ANION GAP SERPL CALC-SCNC: 12 MMOL/L (ref 8–16)
BASOPHILS # BLD AUTO: 0.05 K/UL (ref 0–0.2)
BASOPHILS NFR BLD: 0.3 % (ref 0–1.9)
BUN SERPL-MCNC: 15 MG/DL (ref 8–23)
CALCIUM SERPL-MCNC: 8.3 MG/DL (ref 8.7–10.5)
CHLORIDE SERPL-SCNC: 101 MMOL/L (ref 95–110)
CO2 SERPL-SCNC: 24 MMOL/L (ref 23–29)
CREAT SERPL-MCNC: 1.4 MG/DL (ref 0.5–1.4)
DIFFERENTIAL METHOD: ABNORMAL
EOSINOPHIL # BLD AUTO: 0.6 K/UL (ref 0–0.5)
EOSINOPHIL NFR BLD: 3.9 % (ref 0–8)
ERYTHROCYTE [DISTWIDTH] IN BLOOD BY AUTOMATED COUNT: 16 % (ref 11.5–14.5)
EST. GFR  (AFRICAN AMERICAN): 40.4 ML/MIN/1.73 M^2
EST. GFR  (NON AFRICAN AMERICAN): 35 ML/MIN/1.73 M^2
GLUCOSE SERPL-MCNC: 83 MG/DL (ref 70–110)
HCT VFR BLD AUTO: 33 % (ref 37–48.5)
HGB BLD-MCNC: 9.8 G/DL (ref 12–16)
IMM GRANULOCYTES # BLD AUTO: 0.07 K/UL (ref 0–0.04)
IMM GRANULOCYTES NFR BLD AUTO: 0.5 % (ref 0–0.5)
LYMPHOCYTES # BLD AUTO: 1.6 K/UL (ref 1–4.8)
LYMPHOCYTES NFR BLD: 11.2 % (ref 18–48)
MAGNESIUM SERPL-MCNC: 1.6 MG/DL (ref 1.6–2.6)
MCH RBC QN AUTO: 25.9 PG (ref 27–31)
MCHC RBC AUTO-ENTMCNC: 29.7 G/DL (ref 32–36)
MCV RBC AUTO: 87 FL (ref 82–98)
MONOCYTES # BLD AUTO: 4.2 K/UL (ref 0.3–1)
MONOCYTES NFR BLD: 28.5 % (ref 4–15)
NEUTROPHILS # BLD AUTO: 8.1 K/UL (ref 1.8–7.7)
NEUTROPHILS NFR BLD: 55.6 % (ref 38–73)
NRBC BLD-RTO: 0 /100 WBC
PHOSPHATE SERPL-MCNC: 4 MG/DL (ref 2.7–4.5)
PLATELET # BLD AUTO: 284 K/UL (ref 150–350)
PMV BLD AUTO: 10 FL (ref 9.2–12.9)
POCT GLUCOSE: 123 MG/DL (ref 70–110)
POTASSIUM SERPL-SCNC: 3.9 MMOL/L (ref 3.5–5.1)
PROCALCITONIN SERPL IA-MCNC: 0.52 NG/ML
RBC # BLD AUTO: 3.78 M/UL (ref 4–5.4)
SODIUM SERPL-SCNC: 137 MMOL/L (ref 136–145)
TB INDURATION 48 - 72 HR READ: 0 MM
WBC # BLD AUTO: 14.57 K/UL (ref 3.9–12.7)

## 2020-03-03 PROCEDURE — 94799 UNLISTED PULMONARY SVC/PX: CPT

## 2020-03-03 PROCEDURE — 63600175 PHARM REV CODE 636 W HCPCS: Performed by: INTERNAL MEDICINE

## 2020-03-03 PROCEDURE — 25000003 PHARM REV CODE 250: Performed by: INTERNAL MEDICINE

## 2020-03-03 PROCEDURE — 27000221 HC OXYGEN, UP TO 24 HOURS

## 2020-03-03 PROCEDURE — 99232 PR SUBSEQUENT HOSPITAL CARE,LEVL II: ICD-10-PCS | Mod: ,,, | Performed by: INTERNAL MEDICINE

## 2020-03-03 PROCEDURE — 25000242 PHARM REV CODE 250 ALT 637 W/ HCPCS: Performed by: INTERNAL MEDICINE

## 2020-03-03 PROCEDURE — 80048 BASIC METABOLIC PNL TOTAL CA: CPT

## 2020-03-03 PROCEDURE — 87449 NOS EACH ORGANISM AG IA: CPT

## 2020-03-03 PROCEDURE — 97530 THERAPEUTIC ACTIVITIES: CPT

## 2020-03-03 PROCEDURE — 97535 SELF CARE MNGMENT TRAINING: CPT

## 2020-03-03 PROCEDURE — 99900035 HC TECH TIME PER 15 MIN (STAT)

## 2020-03-03 PROCEDURE — 94761 N-INVAS EAR/PLS OXIMETRY MLT: CPT

## 2020-03-03 PROCEDURE — 11000001 HC ACUTE MED/SURG PRIVATE ROOM

## 2020-03-03 PROCEDURE — 85025 COMPLETE CBC W/AUTO DIFF WBC: CPT

## 2020-03-03 PROCEDURE — 99223 PR INITIAL HOSPITAL CARE,LEVL III: ICD-10-PCS | Mod: GC,,, | Performed by: INTERNAL MEDICINE

## 2020-03-03 PROCEDURE — 83735 ASSAY OF MAGNESIUM: CPT

## 2020-03-03 PROCEDURE — 99232 SBSQ HOSP IP/OBS MODERATE 35: CPT | Mod: ,,, | Performed by: INTERNAL MEDICINE

## 2020-03-03 PROCEDURE — 84100 ASSAY OF PHOSPHORUS: CPT

## 2020-03-03 PROCEDURE — 94660 CPAP INITIATION&MGMT: CPT

## 2020-03-03 PROCEDURE — 36415 COLL VENOUS BLD VENIPUNCTURE: CPT

## 2020-03-03 PROCEDURE — 84145 PROCALCITONIN (PCT): CPT

## 2020-03-03 PROCEDURE — 94640 AIRWAY INHALATION TREATMENT: CPT

## 2020-03-03 PROCEDURE — 99223 1ST HOSP IP/OBS HIGH 75: CPT | Mod: GC,,, | Performed by: INTERNAL MEDICINE

## 2020-03-03 RX ORDER — SODIUM CHLORIDE FOR INHALATION 3 %
4 VIAL, NEBULIZER (ML) INHALATION ONCE
Status: COMPLETED | OUTPATIENT
Start: 2020-03-03 | End: 2020-03-03

## 2020-03-03 RX ADMIN — IPRATROPIUM BROMIDE AND ALBUTEROL SULFATE 3 ML: .5; 3 SOLUTION RESPIRATORY (INHALATION) at 08:03

## 2020-03-03 RX ADMIN — AZITHROMYCIN MONOHYDRATE 500 MG: 500 INJECTION, POWDER, LYOPHILIZED, FOR SOLUTION INTRAVENOUS at 12:03

## 2020-03-03 RX ADMIN — SODIUM CHLORIDE 30 MG/ML INHALATION SOLUTION 4 ML: 30 SOLUTION INHALANT at 01:03

## 2020-03-03 RX ADMIN — PANTOPRAZOLE SODIUM 40 MG: 40 TABLET, DELAYED RELEASE ORAL at 08:03

## 2020-03-03 RX ADMIN — CEFEPIME 2 G: 2 INJECTION, POWDER, FOR SOLUTION INTRAVENOUS at 06:03

## 2020-03-03 RX ADMIN — ENOXAPARIN SODIUM 40 MG: 100 INJECTION SUBCUTANEOUS at 05:03

## 2020-03-03 RX ADMIN — ACETAMINOPHEN 650 MG: 325 TABLET ORAL at 08:03

## 2020-03-03 RX ADMIN — PREDNISONE 5 MG: 5 TABLET ORAL at 08:03

## 2020-03-03 RX ADMIN — ATORVASTATIN CALCIUM 40 MG: 20 TABLET, FILM COATED ORAL at 08:03

## 2020-03-03 RX ADMIN — ASPIRIN 81 MG: 81 TABLET, COATED ORAL at 08:03

## 2020-03-03 RX ADMIN — VANCOMYCIN HYDROCHLORIDE 1250 MG: 1.25 INJECTION, POWDER, LYOPHILIZED, FOR SOLUTION INTRAVENOUS at 07:03

## 2020-03-03 RX ADMIN — HYDROXYCHLOROQUINE SULFATE 200 MG: 200 TABLET, FILM COATED ORAL at 08:03

## 2020-03-03 RX ADMIN — POLYETHYLENE GLYCOL 3350 17 G: 17 POWDER, FOR SOLUTION ORAL at 07:03

## 2020-03-03 RX ADMIN — DICYCLOMINE HYDROCHLORIDE 10 MG: 10 CAPSULE ORAL at 09:03

## 2020-03-03 RX ADMIN — IPRATROPIUM BROMIDE AND ALBUTEROL SULFATE 3 ML: .5; 3 SOLUTION RESPIRATORY (INHALATION) at 01:03

## 2020-03-03 RX ADMIN — FUROSEMIDE 40 MG: 40 TABLET ORAL at 08:03

## 2020-03-03 RX ADMIN — ACETAMINOPHEN 650 MG: 325 TABLET ORAL at 03:03

## 2020-03-03 NOTE — PLAN OF CARE
Problem: Physical Therapy Goal  Goal: Physical Therapy Goal  Description  Goals to be met by: 3/13/2020     Patient will increase functional independence with mobility by performin. Supine to sit with Contact Guard Assistance  2. Sit to supine with Contact Guard Assistance  3. Sit to stand transfer with Minimal Assistance using Rolling Walker or LRAD  4. Bed to chair transfer with Minimal Assistance using Rolling Walker or LRAD  5. Gait  x 25 feet with Contact Guard Assistance using Rolling Walker.   6. Sitting at edge of bed x10 minutes with Stand-by Assistance  7. Lower extremity exercise program x20 reps per handout, with independence     Outcome: Ongoing, Progressing     Jena Schuster, PT, DPT  3/3/2020

## 2020-03-03 NOTE — SUBJECTIVE & OBJECTIVE
Past Medical History:   Diagnosis Date    Acute hypoxemic respiratory failure 4/11/2018    Altered mental status     Anemia     Arthritis     Bilateral hand pain 3/14/2018    Branch retinal vein occlusion, left eye 2/20/2015    Chronic bilateral low back pain without sciatica 3/23/2017    Chronic renal failure in pediatric patient, stage 3 (moderate) 4/15/2018    Cognitive communication deficit 12/19/2017    Cystoid macular edema, left eye 2/20/2015    Cystoid macular edema, left eye 2/20/2015    DJD (degenerative joint disease) of cervical spine 5/15/2013    Fatty liver 8/26/2014    Goiter 4/9/2018    Hashimoto's disease     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    Hypertension     Hypertensive encephalopathy     IBS (irritable bowel syndrome) 6/21/2017    IGT (impaired glucose tolerance) 1/12/2016    Iron deficiency anemia secondary to inadequate dietary iron intake 6/24/2013    Joint pain     Keratoconjunctivitis sicca of both eyes not specified as Sjogren's 7/29/2016    Leiomyoma of uterus, unspecified 9/16/2014    Long QT interval 6/29/2016    Due to medication (plaquenil)     Macular edema 1/12/2015    Multinodular goiter 1/12/2016    Neuropathy 8/23/2017    Plaquenil causing adverse effect in therapeutic use 10/7/2016    Pneumococcal vaccine refused 8/17/2016    Pneumonia due to Streptococcus pneumoniae 4/5/2018    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/2015    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    s/p Right Total knee replacement 5/15/2013    Scoliosis of thoracic spine 5/15/2013    Small vessel disease, cerebrovascular 12/28/2017    Stroke     UTI (urinary tract infection) 12/29/2018    Vascular dementia 12/6/2017       Past Surgical History:   Procedure Laterality Date    BREAST CYST EXCISION      CATARACT EXTRACTION      COLONOSCOPY N/A 9/29/2015    Procedure: COLONOSCOPY;  Surgeon: FIDELINA Sanchez MD;  Location: [facility] ([address] [place] [address]);  Service: Endoscopy;   Laterality: N/A;    EYE SURGERY      JOINT REPLACEMENT      right knee    KNEE SURGERY Left 12/31/2013    TKR    left parotidectomy      mixed tumor    SALIVARY GLAND SURGERY      TONSILLECTOMY         Review of patient's allergies indicates:  No Known Allergies    Family History     Problem Relation (Age of Onset)    Breast cancer Maternal Grandmother    Cancer Father    Heart disease Mother    Hypertension Mother    Hyperthyroidism Mother, Other    Prostate cancer Father        Tobacco Use    Smoking status: Never Smoker    Smokeless tobacco: Never Used   Substance and Sexual Activity    Alcohol use: Yes     Alcohol/week: 0.0 standard drinks     Frequency: Monthly or less     Drinks per session: 1 or 2     Binge frequency: Never     Comment: very seldom     Drug use: No    Sexual activity: Not Currently     Comment: ,  age 52,          Review of Systems   Constitutional: Positive for fever.   HENT: Negative for rhinorrhea and sinus pressure.    Respiratory: Negative for cough and shortness of breath.    Cardiovascular: Negative for chest pain and leg swelling.   Gastrointestinal: Positive for abdominal distention and constipation. Negative for diarrhea.   Genitourinary: Negative.    Musculoskeletal: Negative for arthralgias and myalgias.   Skin: Negative for rash.   Allergic/Immunologic: Positive for immunocompromised state.   Neurological: Positive for headaches. Negative for light-headedness.   Psychiatric/Behavioral: Positive for confusion.     Objective:     Vital Signs (Most Recent):  Temp: 98.3 °F (36.8 °C) (03/03/20 1200)  Pulse: 96 (03/03/20 1200)  Resp: 18 (03/03/20 0836)  BP: (!) 111/54 (03/03/20 1200)  SpO2: 97 % (03/03/20 1200) Vital Signs (24h Range):  Temp:  [98.1 °F (36.7 °C)-101 °F (38.3 °C)] 98.3 °F (36.8 °C)  Pulse:  [] 96  Resp:  [17-25] 18  SpO2:  [93 %-98 %] 97 %  BP: ()/(53-64) 111/54     Weight: 84.4 kg (186 lb)  Body mass index is 31.93  kg/m².      Intake/Output Summary (Last 24 hours) at 3/3/2020 1316  Last data filed at 3/3/2020 0600  Gross per 24 hour   Intake 250 ml   Output 300 ml   Net -50 ml       Physical Exam   Constitutional: She appears well-developed. No distress.   HENT:   Head: Normocephalic and atraumatic.   Eyes: No scleral icterus.   Neck: Neck supple.   Cardiovascular: Normal rate and regular rhythm.   Pulmonary/Chest:   Breathing comfortably on 1.5L O2. Faint rhales in the left base, no wheezing or rhonchi. Speaking in full sentences.   Abdominal: Soft. There is no tenderness.   Musculoskeletal: She exhibits no edema.   Chronic RA changes of the hands with ulnar deviation at some DIPs   Neurological: She is alert. Coordination normal.   Skin: Skin is warm and dry. No rash noted. She is not diaphoretic.         Lines/Drains/Airways     Drain            Female External Urinary Catheter 02/29/20 0600 3 days          Peripheral Intravenous Line                 Peripheral IV - Single Lumen 02/28/20 1235 20 G;1 3/4 in Left;Anterior Forearm 4 days                Significant Labs:    CBC/Anemia Profile:  Recent Labs   Lab 03/02/20  0833 03/03/20  0817   WBC 15.04* 14.57*   HGB 10.7* 9.8*   HCT 36.3* 33.0*    284   MCV 87 87   RDW 15.7* 16.0*        Chemistries:  Recent Labs   Lab 03/02/20  0439 03/03/20  0459    137   K 3.9 3.9    101   CO2 25 24   BUN 11 15   CREATININE 1.1 1.4   CALCIUM 8.4* 8.3*   MG 1.6 1.6   PHOS 3.6 4.0     procalcitonin 0.2    Blood Culture: NGTD  Respiratory culture: inadequate sample    Thyroid pathology 1/22/20: fredy    All pertinent labs within the past 24 hours have been reviewed.    Significant Imaging:   CT: I have reviewed all pertinent results/findings within the past 24 hours and my personal findings are:  Peripheral GGOs & dense opacification. Very large thyroid mass with posterior deviation of the intrathoracic trachea and anterior pressure in the lower cervical trachea.

## 2020-03-03 NOTE — PROGRESS NOTES
[facility] Hospital Medicine                                                                     Progress Note     Team: [facility] G Gerber Shaw MD   Admit Date: 2/27/2020   Hospital Day: 5  LETICIA: 3/5/2020  Code status: Full Code   Principal Problem: Acute respiratory failure with hypoxia     SUMMARY:     Selma Lux is a 84 y.o. 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 She states that her PT was commenting on how much weaker she had gotten as well. The patient does complain of not being able to do the things she would like to anymore and that she was probably going to see a psychiatrist soon. Patient is on valium, gabapentin, and baclofen. Per patients daughter she only takes valium and higher dose of gabapentin at night for sleep. Baclofen is only taken once a day now, per her PCP the last time she saw her in the clinic. In ED, CXR with infiltrates and UA concerning for UTI. Medicine called for admission.     Admitted to hospital medicine for acute metabolic encephalopathy suspected secondary to UTI and CAP. Obtained urine and started on BSabx. Unfortunately for blood cultures not obtained, ordered, NGTD. Urine with E Coli. Hypoglycemia noted, suspecting from infection and poor po intake. Since admission patient has gradually improved with supportive care. Changed IV abx to PO. PT OT rec SNF, plan  was discharge to SNF. Unfortunately 3/2 morning patient was found to be hypoxic Sp02 88% and lethargic. Stat ABG and CTA ordered. No piror hx of FREEDOM. Respiratory panel negative. ABG with respiratory alkalosis. CTA with no PE but noting worsening consolidations, will consult pulmonary. Restarted BSabx. Pulmonary offered bronchoscopy due to hx of , however patient/daughter declined at this time. Plan to continue BSabx for now, and if improving will hold off on increasing home steroids. Patient was attempted on CPAP overnight for concerns of underlying FREEDOM, and patient tolerated well with resultant good sleep as per daughter/patient.     SUBJECTIVE:     Pt was seen and examined at bedside. Tm 101 overnight. No complaints this AM. Patient appears better this morning from yesterday. More alert and interactive. Daughter stated she great sleep overnight with CPAP trial. No cough, cp or dyspnea endorsed. Case discussed with pulmonary.       ROS (Positive in Bold, otherwise negative)  Pain Scale: 0 /10   Constitutional: fever, chills, night sweats, generalized weakness   CV: chest pain, edema, palpitations  Resp: SOB, cough, sputum production  GI: changes in appetite, NVDC, pain, melena, hematochezia, GERD, hematemesis  : Dysuria, hematuria, urinary urgency, frequency  MSK: arthralgia/myalgia, joint swelling  SKIN: rashes, pruritis, petechiae   Neuro/Psych: FND, anxiety, depression      OBJECTIVE:     Vitals:  Temp:  [98.1 °F (36.7 °C)-101 °F (38.3 °C)]   Pulse:  []   Resp:  [17-25]   BP: ()/(53-64)   SpO2:  [93 %-98 %]      I & O (Last 24H):     Intake/Output Summary (Last 24 hours) at 3/3/2020 1609  Last data filed at 3/3/2020 0600  Gross per 24 hour   Intake 250 ml   Output 300 ml   Net -50 ml       GEN: AA female  in no acute distress. Less lethargic this AM. Resting in bed. Cooperative. Calm.  HEENT: NCAT. PERRL. EOMI. Conjunctivae/corneas clear, sclera Anicteric.  CVS: RRR. Normal s1 s2 +murmur, no  click, rub or gallop  LUN.5L CTAB. Normal respiratory effort. No wheezes or rhonchi. Bibasilar crackles L>R  ABD: Normoactive BS, soft, NT, ND, no masses or organomegaly.  EXT: trace LE edema. No cyanosis. Full ROM.  SKIN: color, texture, turgor normal. No rashes or lesions  NEURO: Alert, oriented x 3, grossly normal, globally weak      All recent labs and imaging has been reviewed.     Recent Results (from the past 24 hour(s))   POCT TB Skin Test Read    Collection Time: 20  3:43 AM   Result Value Ref Range    TB Induration 48 - 72 hr read 0 mm   Magnesium    Collection Time: 20  4:59 AM   Result Value Ref Range    Magnesium 1.6 1.6 - 2.6 mg/dL   Phosphorus    Collection Time: 20  4:59 AM   Result Value Ref Range    Phosphorus 4.0 2.7 - 4.5 mg/dL   Basic metabolic panel    Collection Time: 20  4:59 AM   Result Value Ref Range    Sodium 137 136 - 145 mmol/L    Potassium 3.9 3.5 - 5.1 mmol/L    Chloride 101 95 - 110 mmol/L    CO2 24 23 - 29 mmol/L    Glucose 83 70 - 110 mg/dL    BUN, Bld 15 8 - 23 mg/dL    Creatinine 1.4 0.5 - 1.4 mg/dL    Calcium 8.3 (L) 8.7 - 10.5 mg/dL    Anion Gap 12 8 - 16 mmol/L    eGFR if African American 40.4 (A) >60 mL/min/1.73 m^2    eGFR if non African American 35.0 (A) >60 mL/min/1.73 m^2   CBC auto differential    Collection Time: 20  8:17 AM   Result Value Ref Range    WBC 14.57 (H) 3.90 - 12.70 K/uL    RBC 3.78 (L) 4.00 - 5.40 M/uL    Hemoglobin 9.8 (L) 12.0 - 16.0 g/dL    Hematocrit 33.0 (L) 37.0 - 48.5 %    Mean Corpuscular Volume 87 82 - 98 fL    Mean Corpuscular Hemoglobin 25.9 (L) 27.0 - 31.0 pg    Mean Corpuscular Hemoglobin Conc 29.7 (L) 32.0 - 36.0 g/dL    RDW 16.0 (H) 11.5 - 14.5 %    Platelets 284 150 - 350 K/uL    MPV 10.0 9.2 - 12.9 fL    Immature Granulocytes 0.5 0.0 - 0.5 %    Gran # (ANC) 8.1 (H) 1.8 - 7.7 K/uL    Immature Grans (Abs) 0.07 (H) 0.00 - 0.04 K/uL    Lymph # 1.6 1.0 - 4.8 K/uL    Mono # 4.2 (H) 0.3 - 1.0 K/uL    Eos # 0.6  (H) 0.0 - 0.5 K/uL    Baso # 0.05 0.00 - 0.20 K/uL    nRBC 0 0 /100 WBC    Gran% 55.6 38.0 - 73.0 %    Lymph% 11.2 (L) 18.0 - 48.0 %    Mono% 28.5 (H) 4.0 - 15.0 %    Eosinophil% 3.9 0.0 - 8.0 %    Basophil% 0.3 0.0 - 1.9 %    Differential Method Automated    POCT glucose    Collection Time: 03/03/20 11:35 AM   Result Value Ref Range    POCT Glucose 123 (H) 70 - 110 mg/dL   Procalcitonin    Collection Time: 03/03/20 12:49 PM   Result Value Ref Range    Procalcitonin 0.52 (H) <0.25 ng/mL       Recent Labs   Lab 03/01/20  0735 03/01/20  1142 03/01/20  1729 03/01/20  2147 03/02/20  1028 03/03/20  1135   POCTGLUCOSE 93 160* 146* 111* 117* 123*       Hemoglobin A1C   Date Value Ref Range Status   08/27/2019 5.6 4.0 - 5.6 % Final     Comment:     ADA Screening Guidelines:  5.7-6.4%  Consistent with prediabetes  >or=6.5%  Consistent with diabetes  High levels of fetal hemoglobin interfere with the HbA1C  assay. Heterozygous hemoglobin variants (HbS, HgC, etc)do  not significantly interfere with this assay.   However, presence of multiple variants may affect accuracy.     03/13/2019 5.7 (H) 4.0 - 5.6 % Final     Comment:     ADA Screening Guidelines:  5.7-6.4%  Consistent with prediabetes  >or=6.5%  Consistent with diabetes  High levels of fetal hemoglobin interfere with the HbA1C  assay. Heterozygous hemoglobin variants (HbS, HgC, etc)do  not significantly interfere with this assay.   However, presence of multiple variants may affect accuracy.     08/24/2017 5.5 4.0 - 5.6 % Final     Comment:     According to ADA guidelines, hemoglobin A1c <7.0% represents  optimal control in non-pregnant diabetic patients. Different  metrics may apply to specific patient populations.   Standards of Medical Care in Diabetes-2016.  For the purpose of screening for the presence of diabetes:  <5.7%     Consistent with the absence of diabetes  5.7-6.4%  Consistent with increasing risk for diabetes   (prediabetes)  >or=6.5%  Consistent with  diabetes  Currently, no consensus exists for use of hemoglobin A1c  for diagnosis of diabetes for children.  This Hemoglobin A1c assay has significant interference with fetal   hemoglobin   (HbF). The results are invalid for patients with abnormal amounts of   HbF,   including those with known Hereditary Persistence   of Fetal Hemoglobin. Heterozygous hemoglobin variants (HbAS, HbAC,   HbAD, HbAE, HbA2) do not significantly interfere with this assay;   however, presence of multiple variants in a sample may impact the %   interference.          Active Hospital Problems    Diagnosis  POA    *Acute respiratory failure with hypoxia [J96.01]  No    Acute cystitis without hematuria [N30.00]  Yes    Arthritis [M19.90]  Yes    Hyperlipidemia [E78.5]  Yes    Multinodular goiter [E04.2]  Yes    AF (paroxysmal atrial fibrillation) [I48.0]  Yes    Generalized weakness [R53.1]  Yes    Hypertension, essential [I10]  Yes    Iron deficiency anemia secondary to inadequate dietary iron intake [D50.8]  Yes      Resolved Hospital Problems   No resolved problems to display.          ASSESSMENT AND PLAN:     Acute hypoxic respiratory failure  Community acquired pneumonia  Cryptogenic organizing pneumonia on chronic prednisone  Pulmonary HTN secondary to ILD  - On admit - CXR with bibasilar infiltrates however no fever or cough  - started on azithro and rocephin which was transitioned to keflex for UTI as patient didn't have any fever, dyspnea or cough  - 3/2 morning patient was found to be hypoxic Sp02 88% and lethargic.   - Stat ABG and CTA ordered. ABG with respiratory alkalosis  - CTA with worsening consolidations, restarted ceftriaxone for now, and will consult pulmonary for therapy guidance   - restarted on BSabx offered bronchoscopy due to hx of , however patient/daughter declined at this time.   - Plan to continue BSabx for now, and if improving will hold off on increasing home steroids.   - Patient was attempted on  CPAP overnight for concerns of underlying FREEDOM, and patient tolerated well with resultant good sleep as per daughter/patient.   - Duonebs scheduled and supplemental oxygen PRN  - IS provided  - Continue home prednisone for now  - Will need pulmonary fu on discharge     Acute cystitis - resolved  - symptoms concerning for UTI with altered mentation  - UA positive for bacteria and WBC, leuk and nitrates  - given fluids and rocephin  - urine cx with e coli, changed ceftriaxone to keflex - finished tx  - bcx not taken in ED, ordered, NGTD   - afebrile and HDS    Hx of vascular dementia   Acute metabolic encephalopathy  - metabolic secondary to above, resolved  - she continued to have intermittent confusion at night, and early AM suspecting from underlying age/dementia  - Patient is on valium, gabapentin, and baclofen, likely contributed to this  - d/c home valium, decrease gabapentin dose, continue prn baclofen     Generalized weakness  Multiple falls at home  - secondary to above issues  - PT OT rec SNF  - CM SW updated, they have sent referrals    HFrEF  - last TTE in 8/2019 with 40% EF  - appears compensated at this time  - repeat TTE with EF 55% and grade 1 DD, normal CVP    HTN  - hold home amlodipine for now    RA  - hold cellcept for now due to infection  - continue home plaquenil and steroids  - resume cellcept tomorrow once off abx    HLD  - continue statin    Leukocytosis  - suspected reactive to above     Hypomagnesemia - resolved  - replete and recheck     Hypokalemia - resolved   - replete and recheck     Long QT interval   - noted      Prophylaxis- lovenox  Code Status- Full Code   Discharge plan and follow up - d/c to SNF as per PT OT once medically stable    Gerber Shaw MD  [facility] Medicine Staff  Pager [phone]

## 2020-03-03 NOTE — ASSESSMENT & PLAN NOTE
Acute hypoxic respiratory failure associated with fevers and peripheral opacities on CT not inconsistent with her previous episodes of . It is likely that her symptoms are related to a decrease in her prednisone dose as she has flared with this in the past. Of concern is that she could have developed CAP, and with immunosuppression, she is at risk for OIs.  -con't broad spectrum antibiotics + atypical coverage  -recheck procalcitonin level  -induced sputum culture with hypertonic saline  -if she fails to demonstrate improvement tomorrow, will plan to escalate steroids

## 2020-03-03 NOTE — PT/OT/SLP PROGRESS
Occupational Therapy   Treatment    Name: Selma Lux  MRN: [id number]  Admitting Diagnosis:  Acute respiratory failure with hypoxia       Recommendations:     Discharge Recommendations: nursing facility, skilled  Discharge Equipment Recommendations:  (TBD pending progress)  Barriers to discharge:  Decreased caregiver support    Assessment:     Selma Lux is a 84 y.o. female with a medical diagnosis of Acute respiratory failure with hypoxia.  She presents with the following the following performance deficits affecting function: weakness, impaired endurance, impaired self care skills, pain, decreased upper extremity function, impaired cardiopulmonary response to activity, impaired balance, edema. Pt tolerated session fair this date with limitations noted in physical performance during all functional tasks. Based on the deficits listed above, pt continues to require increased assistance for ADLs, bed mobility, and sitting EOB. Pt reported a new onset of abdominal pain that really affected her ability to tolerate upright sitting. Pt lethargic throughout session requiring verbal and tactile cues to remain awake during functional tasks. Pt slowly progressing towards OT goals. OT POC remains appropriate for patient on this date. Pt will continue to benefit from skilled OT to address the deficits affecting her occupational performance.     Rehab Prognosis:  Fair; patient would benefit from acute skilled OT services to address these deficits and reach maximum level of function.       Plan:     Patient to be seen 4 x/week to address the above listed problems via self-care/home management, therapeutic activities, therapeutic exercises, neuromuscular re-education  · Plan of Care Expires: 03/28/20  · Plan of Care Reviewed with: patient    Subjective     Pain/Comfort:  · Pain Rating 1: (pt did not rate; pt c/o abdominal pain in sitting position)  · Pain Addressed 1: Pre-medicate for activity, Reposition,  Distraction  · Pain Rating Post-Intervention 1: (pt did not rate; pain remained throughout the session)  · Pain Addressed 2: Cessation of Activity, Nurse notified    Objective:     Communicated with: RN prior to session.  Patient found HOB elevated with telemetry, PureWick, peripheral IV, oxygen upon OT entry to room.    General Precautions: Standard, fall   Orthopedic Precautions:N/A   Braces: N/A     Occupational Performance:     Bed Mobility:    · Patient completed Rolling/Turning to Right with maximal assistance  · Patient completed Scooting/Bridging with maximal assistance and 2 persons for EOB and posterior positioning   · Patient completed Supine to Sit with maximal assistance and 2 persons for trunk control and BLE placement over bed 2/2 pain and weakness  · Patient completed Sit to Supine with maximal assistance and 2 persons for trunk control and BLE placement into bed     Functional Mobility/Transfers:  · Deferred 2/2 pain and decreased tolerance for upright sitting   · Pt also lethargic throughout session     Activities of Daily Living:  · Feeding:  min cuing for initiation of grasping cup and bring to mouth; set up assistance required while EOB  · Pt grasped utensil, obtained food, and brought to mouth while EOB with mod A for sitting balance   · Grooming: set up assistance and min cuing to initiate facial hygiene while EOB   · Upper Body Dressing: minimum assistance for sitting balance while donning gown as jacket EOB  · Lower Body Dressing: maximal assistance to don BLE socks seated EOB  · Toileting: dependence with use of purewick      AMPA 6 Click ADL: 11    Treatment & Education:  - Role of OT/ OT POC  - Self care safety/ independence  - Functional transfer/ mobility safety  - Bed mobility safety  - Pursed lip breathing  - Importance of placing bed in chair position; pt deferred this date   - Functional performance this date   - Pt tolerated ~20 minutes of sitting EOB with max A- CGA with cuing  required for neutral sitting position. Pt presented with a forward flexed posture and inability to tolerate prolonged cervical extension.    Patient left HOB elevated with all lines intact, call button in reach and RN notifiedEducation:      GOALS:   Multidisciplinary Problems     Occupational Therapy Goals        Problem: Occupational Therapy Goal    Goal Priority Disciplines Outcome Interventions   Occupational Therapy Goal     OT, PT/OT Ongoing, Progressing    Description:  Goals to be met by: 3/8/20    Patient will increase functional independence with ADLs by performing:    Feeding with Set-up Assistance.- progressing   UE Dressing with Minimal Assistance.  LE Dressing with Minimal Assistance.- initiated   Grooming while standing at bedside with Minimal Assistance with AD as needed to prepare for task in standing at sink.  Toileting from bedside commode with Minimal Assistance for hygiene and clothing management.   Stand pivot transfers with Minimal Assistance to reach stable surface.  Toilet transfer to bedside commode with Minimal Assistance.                       Time Tracking:     OT Date of Treatment: 03/03/20  OT Start Time: 1132  OT Stop Time: 1201  OT Total Time (min): 29 min    Billable Minutes:Self Care/Home Management 10  Therapeutic Activity 19    Cora Oliver OT  3/3/2020

## 2020-03-03 NOTE — PT/OT/SLP PROGRESS
"Physical Therapy Treatment    Patient Name:  Selma Lux   MRN:  [id number]    Recommendations:     Discharge Recommendations:  nursing facility, skilled   Discharge Equipment Recommendations: other (see comments)(tbd)   Barriers to discharge: Inaccessible home and Decreased caregiver support    Assessment:     Selma Lux is a 84 y.o. female admitted with a medical diagnosis of Acute respiratory failure with hypoxia.  She presents with the following impairments/functional limitations:  weakness, impaired endurance, impaired self care skills, impaired functional mobilty, gait instability, impaired balance, decreased safety awareness, pain, edema, impaired cardiopulmonary response to activity, decreased ROM Patient limited by severe abdominal pain and pressure. Patient reporting it felt like "a full water bottle" Patient participated in bed mobility with maximal encouragement but with poor tolerance to activity. MaxA x2 bed mobility, CGA sitting balance EOB for ~18 minutes with Max v/t cues to hold head up. Patient lethargic, verbal cues to keep eyes open during session.    Rehab Prognosis: Fair; patient would benefit from acute skilled PT services to address these deficits and reach maximum level of function.    Recent Surgery: * No surgery found *      Plan:     During this hospitalization, patient to be seen 4 x/week to address the identified rehab impairments via gait training, therapeutic activities, therapeutic exercises, neuromuscular re-education and progress toward the following goals:    · Plan of Care Expires:  03/28/20    Subjective     Chief Complaint: abdominal pain and pressure  Patient/Family Comments/goals: "Can I please just do this tomorrow."  Pain/Comfort:  · Pain Rating 1: 10/10  · Location - Orientation 1: generalized  · Location 1: abdomen  · Pain Addressed 1: Pre-medicate for activity, Reposition, Distraction, Cessation of Activity, Nurse notified  · Pain Rating " Post-Intervention 1: 10/10      Objective:     Communicated with nurse prior to session.  Patient found HOB elevated with telemetry, PureWick, peripheral IV, oxygen upon PT entry to room.     General Precautions: Standard, fall   Orthopedic Precautions:N/A   Braces: N/A     Functional Mobility:  · Bed Mobility:     · Rolling Right: maximal assistance  · Scooting: total assistance  · Supine to Sit: maximal assistance and of 2 persons  · Sit to Supine: maximal assistance and of 2  persons  · Balance: Good. Patient sat EOB ~18 min CGA.       AM-PAC 6 CLICK MOBILITY  Turning over in bed (including adjusting bedclothes, sheets and blankets)?: 2  Sitting down on and standing up from a chair with arms (e.g., wheelchair, bedside commode, etc.): 2  Moving from lying on back to sitting on the side of the bed?: 2  Moving to and from a bed to a chair (including a wheelchair)?: 2  Need to walk in hospital room?: 1  Climbing 3-5 steps with a railing?: 1  Basic Mobility Total Score: 10       Therapeutic Activities and Exercises:   Pt educated on importance of OOB activity to decrease the risks associated with bed rest. Patient sat EOB ~18 min CGA. Increased trunk flexion. V/T cues to hold head up, keep eyes open. Patient holding head erect for ~4 sec at a time. Instruction provided for mobility. Maximal encouragement for mobility and education for benefit of participating in therapy daily. Pt educated on plan and goals with physical therapy. Instructed to call nursing for assistance with out of bed mobility.     Patient left Right sidelying with HOB elevated with all lines intact, call button in reach and nurse present..    GOALS:   Multidisciplinary Problems     Physical Therapy Goals        Problem: Physical Therapy Goal    Goal Priority Disciplines Outcome Goal Variances Interventions   Physical Therapy Goal     PT, PT/OT Ongoing, Progressing     Description:  Goals to be met by: 3/13/2020     Patient will increase functional  independence with mobility by performin. Supine to sit with Contact Guard Assistance  2. Sit to supine with Contact Guard Assistance  3. Sit to stand transfer with Minimal Assistance using Rolling Walker or LRAD  4. Bed to chair transfer with Minimal Assistance using Rolling Walker or LRAD  5. Gait  x 25 feet with Contact Guard Assistance using Rolling Walker.   6. Sitting at edge of bed x10 minutes with Stand-by Assistance  7. Lower extremity exercise program x20 reps per handout, with independence                      Time Tracking:     PT Received On: 20  PT Start Time: 1129     PT Stop Time: 1157  PT Total Time (min): 28 min     Billable Minutes: Therapeutic Activity 28    Treatment Type: Treatment  PT/PTA: PT     PTA Visit Number: 0     Jena Schuster, PT  2020

## 2020-03-03 NOTE — CONSULTS
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 She has a UTI and has been getting appropriate antibiotics, but began running low-grade fevers yesterday morning with a new oxygen requirement. She was diagnosed with  in April 2018 associated with septic shock and ARDS. Since then she has been unable to be weaned off steroids, with flares of her  on several occasions since. She has been titrated down on her prednisone from 15mg->10-->5 in the past few months, with her most recent drop from 10-->5 in the past few weeks. She has been maintained on her stable dose of MMF and hydroxychloroquine during this time. Her flares have been associated with increased oxygen requirements that persist for months in the past, but prior to this admission she was on RA. She denies SOB or significant coughing. She has not had sputum production. Her main complaints are related to constipation with what feels like IBS to her. Her daughter relates that her encephalopathy has improved today. Antibiotics had been deescalated to oral medications to complete treatment for her UTI 2 days ago, but she was placed back on IV antibiotics yesterday with escalation to broader coverage given her immunosuppression.     Past Medical History:   Diagnosis Date    Acute hypoxemic respiratory failure  4/11/2018    Altered mental status     Anemia     Arthritis     Bilateral hand pain 3/14/2018    Branch retinal vein occlusion, left eye 2/20/2015    Chronic bilateral low back pain without sciatica 3/23/2017    Chronic renal failure in pediatric patient, stage 3 (moderate) 4/15/2018    Cognitive communication deficit 12/19/2017    Cystoid macular edema, left eye 2/20/2015    Cystoid macular edema, left eye 2/20/2015    DJD (degenerative joint disease) of cervical spine 5/15/2013    Fatty liver 8/26/2014    Goiter 4/9/2018    Hashimoto's disease     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    Hypertension     Hypertensive encephalopathy     IBS (irritable bowel syndrome) 6/21/2017    IGT (impaired glucose tolerance) 1/12/2016    Iron deficiency anemia secondary to inadequate dietary iron intake 6/24/2013    Joint pain     Keratoconjunctivitis sicca of both eyes not specified as Sjogren's 7/29/2016    Leiomyoma of uterus, unspecified 9/16/2014    Long QT interval 6/29/2016    Due to medication (plaquenil)     Macular edema 1/12/2015    Multinodular goiter 1/12/2016    Neuropathy 8/23/2017    Plaquenil causing adverse effect in therapeutic use 10/7/2016    Pneumococcal vaccine refused 8/17/2016    Pneumonia due to Streptococcus pneumoniae 4/5/2018    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/2015    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    s/p Right Total knee replacement 5/15/2013    Scoliosis of thoracic spine 5/15/2013    Small vessel disease, cerebrovascular 12/28/2017    Stroke     UTI (urinary tract infection) 12/29/2018    Vascular dementia 12/6/2017       Past Surgical History:   Procedure Laterality Date    BREAST CYST EXCISION      CATARACT EXTRACTION      COLONOSCOPY N/A 9/29/2015    Procedure: COLONOSCOPY;  Surgeon: FIDELINA Sanchez MD;  Location: [address]);  Service: Endoscopy;  Laterality: N/A;    EYE SURGERY      JOINT REPLACEMENT      right knee     KNEE SURGERY Left 12/31/2013    TKR    left parotidectomy      mixed tumor    SALIVARY GLAND SURGERY      TONSILLECTOMY         Review of patient's allergies indicates:  No Known Allergies    Family History     Problem Relation (Age of Onset)    Breast cancer Maternal Grandmother    Cancer Father    Heart disease Mother    Hypertension Mother    Hyperthyroidism Mother, Other    Prostate cancer Father        Tobacco Use    Smoking status: Never Smoker    Smokeless tobacco: Never Used   Substance and Sexual Activity    Alcohol use: Yes     Alcohol/week: 0.0 standard drinks     Frequency: Monthly or less     Drinks per session: 1 or 2     Binge frequency: Never     Comment: very seldom     Drug use: No    Sexual activity: Not Currently     Comment: ,  age 52,          Review of Systems   Constitutional: Positive for fever.   HENT: Negative for rhinorrhea and sinus pressure.    Respiratory: Negative for cough and shortness of breath.    Cardiovascular: Negative for chest pain and leg swelling.   Gastrointestinal: Positive for abdominal distention and constipation. Negative for diarrhea.   Genitourinary: Negative.    Musculoskeletal: Negative for arthralgias and myalgias.   Skin: Negative for rash.   Allergic/Immunologic: Positive for immunocompromised state.   Neurological: Positive for headaches. Negative for light-headedness.   Psychiatric/Behavioral: Positive for confusion.     Objective:     Vital Signs (Most Recent):  Temp: 98.3 °F (36.8 °C) (03/03/20 1200)  Pulse: 96 (03/03/20 1200)  Resp: 18 (03/03/20 0836)  BP: (!) 111/54 (03/03/20 1200)  SpO2: 97 % (03/03/20 1200) Vital Signs (24h Range):  Temp:  [98.1 °F (36.7 °C)-101 °F (38.3 °C)] 98.3 °F (36.8 °C)  Pulse:  [] 96  Resp:  [17-25] 18  SpO2:  [93 %-98 %] 97 %  BP: ()/(53-64) 111/54     Weight: 84.4 kg (186 lb)  Body mass index is 31.93 kg/m².      Intake/Output Summary (Last 24 hours) at 3/3/2020 1316  Last data filed at 3/3/2020  0600  Gross per 24 hour   Intake 250 ml   Output 300 ml   Net -50 ml       Physical Exam   Constitutional: She appears well-developed. No distress.   HENT:   Head: Normocephalic and atraumatic.   Eyes: No scleral icterus.   Neck: Neck supple.   Cardiovascular: Normal rate and regular rhythm.   Pulmonary/Chest:   Breathing comfortably on 1.5L O2. Faint rhales in the left base, no wheezing or rhonchi. Speaking in full sentences.   Abdominal: Soft. There is no tenderness.   Musculoskeletal: She exhibits no edema.   Chronic RA changes of the hands with ulnar deviation at some DIPs   Neurological: She is alert. Coordination normal.   Skin: Skin is warm and dry. No rash noted. She is not diaphoretic.         Lines/Drains/Airways     Drain            Female External Urinary Catheter 02/29/20 0600 3 days          Peripheral Intravenous Line                 Peripheral IV - Single Lumen 02/28/20 1235 20 G;1 3/4 in Left;Anterior Forearm 4 days                Significant Labs:    CBC/Anemia Profile:  Recent Labs   Lab 03/02/20  0833 03/03/20  0817   WBC 15.04* 14.57*   HGB 10.7* 9.8*   HCT 36.3* 33.0*    284   MCV 87 87   RDW 15.7* 16.0*        Chemistries:  Recent Labs   Lab 03/02/20  0439 03/03/20  0459    137   K 3.9 3.9    101   CO2 25 24   BUN 11 15   CREATININE 1.1 1.4   CALCIUM 8.4* 8.3*   MG 1.6 1.6   PHOS 3.6 4.0     procalcitonin 0.2    Blood Culture: NGTD  Respiratory culture: inadequate sample    Thyroid pathology 1/22/20: bengin    All pertinent labs within the past 24 hours have been reviewed.    Significant Imaging:   CT: I have reviewed all pertinent results/findings within the past 24 hours and my personal findings are:  Peripheral GGOs & dense opacification. Very large thyroid mass with posterior deviation of the intrathoracic trachea and anterior pressure in the lower cervical trachea.    Assessment/Plan:     * Acute respiratory failure with hypoxia  Acute hypoxic respiratory failure  associated with fevers and peripheral opacities on CT not inconsistent with her previous episodes of . It is likely that her symptoms are related to a decrease in her prednisone dose as she has flared with this in the past. Of concern is that she could have developed CAP, and with immunosuppression, she is at risk for OIs.  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      Thank you for your consult. I will follow-up with patient. Please contact us if you have any additional questions.     Sadia Bennett, DO  Pulmonology  [facility]

## 2020-03-03 NOTE — PLAN OF CARE
Problem: Occupational Therapy Goal  Goal: Occupational Therapy Goal  Description  Goals to be met by: 3/8/20    Patient will increase functional independence with ADLs by performing:    Feeding with Set-up Assistance.- progressing   UE Dressing with Minimal Assistance.  LE Dressing with Minimal Assistance.- initiated   Grooming while standing at bedside with Minimal Assistance with AD as needed to prepare for task in standing at sink.  Toileting from bedside commode with Minimal Assistance for hygiene and clothing management.   Stand pivot transfers with Minimal Assistance to reach stable surface.  Toilet transfer to bedside commode with Minimal Assistance.      Outcome: Ongoing, Progressing     Pt progressing towards OT goals. OT POC remains appropriate for patient on this date. Pt will continue to benefit from skilled OT to address the deficits affecting her occupational performance.    Cora Oliver OTR/L  3/3/20

## 2020-03-03 NOTE — CARE UPDATE
Rapid Response Nurse Chart Check     Chart check completed, abnormal VS noted. Bedside RN Marianna contacted, more febrile this morning (101), tylenol given, MD aware of concern for sepsis, IV fluids started earlier this shift, pt already on vanc and cefepime, no concerns verbalized at this time, instructed to call [phone] for further concerns or assistance.

## 2020-03-03 NOTE — NURSING
MD paged re: severe sepsis alert. BP 99/55, hr 96, rr 25, temp 99.6. Pt on 2LO2 NC. Pt drowsy but otherwise neuro intact. Bipap to be placed on tonight. Cefepime/vanco ordered. Low urine output, will make sure MD aware. Charge RN notified. Will monitor.    Discussed with Charu Randle NP to place orders. Will monitor.

## 2020-03-03 NOTE — HPI
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 She has a UTI and has been getting appropriate antibiotics, but began running low-grade fevers yesterday morning with a new oxygen requirement. She was diagnosed with  in April 2018 associated with septic shock and ARDS. Since then she has been unable to be weaned off steroids, with flares of her  on several occasions since. She has been titrated down on her prednisone from 15mg->10-->5 in the past few months, with her most recent drop from 10-->5 in the past few weeks. She has been maintained on her stable dose of MMF and hydroxychloroquine during this time. Her flares have been associated with increased oxygen requirements that persist for months in the past, but prior to this admission she was on RA. She denies SOB or significant coughing. She has not had sputum production. Her main complaints are related to constipation with what feels like IBS to her. Her daughter relates that her encephalopathy has improved today. Antibiotics had been deescalated to oral medications to complete treatment for her UTI 2 days ago, but she was placed back on IV antibiotics yesterday with escalation to broader coverage given her immunosuppression.

## 2020-03-03 NOTE — PROGRESS NOTES
Pharmacokinetic Initial Assessment: IV Vancomycin    Assessment/Plan:    Initiate maintenance dose of vancomycin 1250mg IV every 24 hours  Desired empiric serum trough concentration is 15 to 20 mcg/mL  Draw vancomycin trough level 30 min prior to next  dose on 03/03 at approximately 1930   Pharmacy will continue to follow and monitor vancomycin.      Please contact pharmacy at extension [phone] with any questions regarding this assessment.     Thank you for the consult,   Rod Mcqueen       Patient brief summary:  Selma Lux is a 84 y.o. female initiated on antimicrobial therapy with IV Vancomycin for treatment of suspected lower respiratory infection    Drug Allergies:   Review of patient's allergies indicates:  No Known Allergies    Actual Body Weight:   84.4kg    Renal Function:   Estimated Creatinine Clearance: 41.5 mL/min (based on SCr of 1.1 mg/dL).,     Dialysis Method (if applicable):  N/A    CBC (last 72 hours):  Recent Labs   Lab Result Units 02/29/20 0625 03/01/20  0339 03/02/20  0833   WBC K/uL 12.76* 12.59 15.04*   Hemoglobin g/dL 10.7* 11.4* 10.7*   Hematocrit % 36.1* 37.9 36.3*   Platelets K/uL 266 319 314   Gran% % 55.6 54.1 56.8   Lymph% % 11.8* 13.1* 10.9*   Mono% % 28.6* 28.9* 28.3*   Eosinophil% % 3.3 3.3 3.3   Basophil% % 0.2 0.3 0.2   Differential Method  Automated Automated Automated       Metabolic Panel (last 72 hours):  Recent Labs   Lab Result Units 02/29/20 0625 03/01/20  0339 03/02/20  0439   Sodium mmol/L 137 138 138   Potassium mmol/L 3.4* 3.6 3.9   Chloride mmol/L 102 102 103   CO2 mmol/L 24 26 25   Glucose mg/dL 124* 95 84   BUN, Bld mg/dL 11 10 11   Creatinine mg/dL 1.2 1.1 1.1   Albumin g/dL 2.7*  --   --    Total Bilirubin mg/dL 0.6  --   --    Alkaline Phosphatase U/L 62  --   --    AST U/L 12  --   --    ALT U/L 8*  --   --    Magnesium mg/dL 1.5* 1.9 1.6   Phosphorus mg/dL 3.0 3.1 3.6       Drug levels (last 3 results):  No results for input(s): VANCOMYCINRA,  VANCOMYCINPE, VANCOMYCINTR in the last 72 hours.    Microbiologic Results:  Microbiology Results (last 7 days)       Procedure Component Value Units Date/Time    Respiratory Infection Panel, Nasopharyngeal [[id number]] Collected:  03/02/20 0910    Order Status:  Completed Specimen:  Nasopharyngeal Swab Updated:  03/02/20 1057     Respiratory Infection Panel Source NP Swab     Adenovirus Not Detected     Coronavirus 229E, Common Cold Virus Not Detected     Coronavirus HKU1, Common Cold Virus Not Detected     Coronavirus NL63, Common Cold Virus Not Detected     Coronavirus OC43, Common Cold Virus Not Detected     Comment: The Coronavirus strains detected in this test cause the common cold.  These strains are not the COVID-19 (novel Coronavirus)strain   associated with the respiratory disease outbreak.          Human Metapneumovirus Not Detected     Human Rhinovirus/Enterovirus Not Detected     Influenza A (subtypes H1, H1-2009,H3) Not Detected     Influenza B Not Detected     Parainfluenza Virus 1 Not Detected     Parainfluenza Virus 2 Not Detected     Parainfluenza Virus 3 Not Detected     Parainfluenza Virus 4 Not Detected     Respiratory Syncytial Virus Not Detected     Bordetella Parapertussis (XX5024) Not Detected     Bordetella pertussis (ptxP) Not Detected     Chlamydia pneumoniae Not Detected     Mycoplasma pneumoniae Not Detected     Comment: Respiratory Infection Panel testing performed by Multiplex PCR.       Narrative:       For all other respiratory sources order [id number] Respiratory  Viral Panel by PCR (RVPCR)    Blood culture [[id number]] Collected:  02/28/20 0926    Order Status:  Completed Specimen:  Blood Updated:  03/02/20 1012     Blood Culture, Routine No Growth to date      No Growth to date      No Growth to date      No Growth to date    Narrative:       1st site  Collection has been rescheduled by TS2 at 02/28/2020 08:16 Reason: pt   getting cleaned   Collection has been rescheduled by TS2 at  02/28/2020 08:16 Reason: pt   getting cleaned     Culture, Respiratory with Gram Stain [[id number]] Collected:  03/02/20 0554    Order Status:  Completed Specimen:  Respiratory from Sputum Updated:  03/02/20 0927     Respiratory Culture Specimen inadequate - culture not performed     Gram Stain (Respiratory) >10epis/lfp and <than many WBC's     Gram Stain (Respiratory) Predominance of oropharyngeal carlos. Please recollect.    Urine culture [[id number]]  (Abnormal)  (Susceptibility) Collected:  02/27/20 1956    Order Status:  Completed Specimen:  Urine Updated:  02/29/20 2009     Urine Culture, Routine ESCHERICHIA COLI  >100,000 cfu/ml      Narrative:       Preferred Collection Type->Urine, Clean Catch    Blood culture [[id number]] Collected:  02/28/20 0922    Order Status:  Sent Specimen:  Blood Updated:  02/28/20 0926    Narrative:       Collection has been rescheduled by TS2 at 02/28/2020 08:16 Reason: pt   getting cleaned   Collection has been rescheduled by TS2 at 02/28/2020 08:16 Reason: pt   getting cleaned

## 2020-03-03 NOTE — PLAN OF CARE
03/03/20 1538   Discharge Reassessment   Assessment Type Discharge Planning Reassessment   Provided patient/caregiver education on the expected discharge date and the discharge plan Yes   Do you have any problems affording any of your prescribed medications? No   Discharge Plan A Rehab   Discharge Plan B Skilled Nursing Facility   DME Needed Upon Discharge  none   Patient choice form signed by patient/caregiver N/A   Anticipated Discharge Disposition Rehab   Can the patient/caregiver answer the patient profile reliably? Yes, cognitively intact

## 2020-03-03 NOTE — NURSING
Telemetry applied to pt per order, box not working, tele notified and sending a new box. Will monitor.

## 2020-03-03 NOTE — PLAN OF CARE
Problem: Fall Injury Risk  Goal: Absence of Fall and Fall-Related Injury  Outcome: Ongoing, Progressing     Problem: Adult Inpatient Plan of Care  Goal: Plan of Care Review  Outcome: Ongoing, Progressing  Goal: Patient-Specific Goal (Individualization)  Outcome: Ongoing, Progressing  Goal: Absence of Hospital-Acquired Illness or Injury  Outcome: Ongoing, Progressing  Goal: Optimal Comfort and Wellbeing  Outcome: Ongoing, Progressing  Goal: Readiness for Transition of Care  Outcome: Ongoing, Progressing  Goal: Rounds/Family Conference  Outcome: Ongoing, Progressing     Problem: Infection  Goal: Infection Symptom Resolution  Outcome: Ongoing, Progressing     Problem: Skin Injury Risk Increased  Goal: Skin Health and Integrity  Outcome: Ongoing, Progressing    Pt a&ox3, sometimes confused on current situation. Denies pain. VSS on 2LO2 NC or bipap 32% O2. Able to tolerate bipap for most of the night. Febrile, tmax 101, tylenol given. LR @100mL/hr x1 bag given. SR to ST on tele. IS encouraged. Voids via purewick. LBM 3/1. Vanco/cefepime ordered. Will monitor.

## 2020-03-03 NOTE — ASSESSMENT & PLAN NOTE
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  -if her respiratory status were to deteriorate to the point that she required intubation, I would recommend against RSI due to her mediastinal portion of her goiter pushing posteriorly on her trachea

## 2020-03-03 NOTE — PLAN OF CARE
Discussed case with Dr. Shaw and Dr. Lux. She would prefer to avoid bronchoscopy at this time, but understands that her risk of infection is elevated with her chronic immunosuppression, as is her risk of  exacerbation with recent change in medications. She would prefer to escalate her empiric antibiotic regimen at this time, and we will revisit the need for bronchoscopy tomorrow. Dr. Lux's daughter was at bedside and participated in the discussion.    Full consult note to follow.    Sadia Bennett 03/02/2020 6:03 PM  [employer] Pul[facility] Fellow

## 2020-03-04 LAB
ALLENS TEST: ABNORMAL
ANION GAP SERPL CALC-SCNC: 9 MMOL/L (ref 8–16)
ANISOCYTOSIS BLD QL SMEAR: SLIGHT
BACTERIA BLD CULT: NORMAL
BASOPHILS # BLD AUTO: 0.04 K/UL (ref 0–0.2)
BASOPHILS NFR BLD: 0.3 % (ref 0–1.9)
BUN SERPL-MCNC: 16 MG/DL (ref 8–23)
BURR CELLS BLD QL SMEAR: ABNORMAL
CALCIUM SERPL-MCNC: 8.3 MG/DL (ref 8.7–10.5)
CHLORIDE SERPL-SCNC: 101 MMOL/L (ref 95–110)
CO2 SERPL-SCNC: 25 MMOL/L (ref 23–29)
CREAT SERPL-MCNC: 1.3 MG/DL (ref 0.5–1.4)
DELSYS: ABNORMAL
DIFFERENTIAL METHOD: ABNORMAL
EOSINOPHIL # BLD AUTO: 0.5 K/UL (ref 0–0.5)
EOSINOPHIL NFR BLD: 3.7 % (ref 0–8)
ERYTHROCYTE [DISTWIDTH] IN BLOOD BY AUTOMATED COUNT: 16.2 % (ref 11.5–14.5)
EST. GFR  (AFRICAN AMERICAN): 44.1 ML/MIN/1.73 M^2
EST. GFR  (NON AFRICAN AMERICAN): 38.3 ML/MIN/1.73 M^2
GLUCOSE SERPL-MCNC: 88 MG/DL (ref 70–110)
HCO3 UR-SCNC: 23.5 MMOL/L (ref 24–28)
HCT VFR BLD AUTO: 33.2 % (ref 37–48.5)
HGB BLD-MCNC: 9.7 G/DL (ref 12–16)
HYPOCHROMIA BLD QL SMEAR: ABNORMAL
IMM GRANULOCYTES # BLD AUTO: 0.09 K/UL (ref 0–0.04)
IMM GRANULOCYTES NFR BLD AUTO: 0.6 % (ref 0–0.5)
LYMPHOCYTES # BLD AUTO: 1.5 K/UL (ref 1–4.8)
LYMPHOCYTES NFR BLD: 10 % (ref 18–48)
MAGNESIUM SERPL-MCNC: 1.7 MG/DL (ref 1.6–2.6)
MCH RBC QN AUTO: 25.3 PG (ref 27–31)
MCHC RBC AUTO-ENTMCNC: 29.2 G/DL (ref 32–36)
MCV RBC AUTO: 87 FL (ref 82–98)
MONOCYTES # BLD AUTO: 3.6 K/UL (ref 0.3–1)
MONOCYTES NFR BLD: 24.7 % (ref 4–15)
NEUTROPHILS # BLD AUTO: 8.8 K/UL (ref 1.8–7.7)
NEUTROPHILS NFR BLD: 60.7 % (ref 38–73)
NRBC BLD-RTO: 0 /100 WBC
OVALOCYTES BLD QL SMEAR: ABNORMAL
PCO2 BLDA: 33 MMHG (ref 35–45)
PH SMN: 7.46 [PH] (ref 7.35–7.45)
PHOSPHATE SERPL-MCNC: 3.4 MG/DL (ref 2.7–4.5)
PLATELET # BLD AUTO: 337 K/UL (ref 150–350)
PMV BLD AUTO: 10.1 FL (ref 9.2–12.9)
PO2 BLDA: 61 MMHG (ref 80–100)
POC BE: 0 MMOL/L
POC SATURATED O2: 92 % (ref 95–100)
POC TCO2: 24 MMOL/L (ref 23–27)
POIKILOCYTOSIS BLD QL SMEAR: SLIGHT
POLYCHROMASIA BLD QL SMEAR: ABNORMAL
POTASSIUM SERPL-SCNC: 3.9 MMOL/L (ref 3.5–5.1)
RBC # BLD AUTO: 3.84 M/UL (ref 4–5.4)
SAMPLE: ABNORMAL
SCHISTOCYTES BLD QL SMEAR: ABNORMAL
SITE: ABNORMAL
SODIUM SERPL-SCNC: 135 MMOL/L (ref 136–145)
VANCOMYCIN TROUGH SERPL-MCNC: 12.7 UG/ML (ref 10–22)
WBC # BLD AUTO: 14.5 K/UL (ref 3.9–12.7)

## 2020-03-04 PROCEDURE — 94761 N-INVAS EAR/PLS OXIMETRY MLT: CPT

## 2020-03-04 PROCEDURE — 27100171 HC OXYGEN HIGH FLOW UP TO 24 HOURS

## 2020-03-04 PROCEDURE — 83735 ASSAY OF MAGNESIUM: CPT

## 2020-03-04 PROCEDURE — 97530 THERAPEUTIC ACTIVITIES: CPT

## 2020-03-04 PROCEDURE — 36415 COLL VENOUS BLD VENIPUNCTURE: CPT

## 2020-03-04 PROCEDURE — 63600175 PHARM REV CODE 636 W HCPCS: Performed by: INTERNAL MEDICINE

## 2020-03-04 PROCEDURE — 25000003 PHARM REV CODE 250: Performed by: INTERNAL MEDICINE

## 2020-03-04 PROCEDURE — 85025 COMPLETE CBC W/AUTO DIFF WBC: CPT

## 2020-03-04 PROCEDURE — 94640 AIRWAY INHALATION TREATMENT: CPT

## 2020-03-04 PROCEDURE — 80202 ASSAY OF VANCOMYCIN: CPT

## 2020-03-04 PROCEDURE — 99233 SBSQ HOSP IP/OBS HIGH 50: CPT | Mod: GC,,, | Performed by: INTERNAL MEDICINE

## 2020-03-04 PROCEDURE — 97530 THERAPEUTIC ACTIVITIES: CPT | Mod: CQ

## 2020-03-04 PROCEDURE — 99900035 HC TECH TIME PER 15 MIN (STAT)

## 2020-03-04 PROCEDURE — 99233 PR SUBSEQUENT HOSPITAL CARE,LEVL III: ICD-10-PCS | Mod: ,,, | Performed by: INTERNAL MEDICINE

## 2020-03-04 PROCEDURE — 27000221 HC OXYGEN, UP TO 24 HOURS

## 2020-03-04 PROCEDURE — 25000242 PHARM REV CODE 250 ALT 637 W/ HCPCS: Performed by: INTERNAL MEDICINE

## 2020-03-04 PROCEDURE — 36600 WITHDRAWAL OF ARTERIAL BLOOD: CPT

## 2020-03-04 PROCEDURE — 27100092 HC HIGH FLOW DELIVERY CANNULA

## 2020-03-04 PROCEDURE — 97112 NEUROMUSCULAR REEDUCATION: CPT

## 2020-03-04 PROCEDURE — 84100 ASSAY OF PHOSPHORUS: CPT

## 2020-03-04 PROCEDURE — 99233 PR SUBSEQUENT HOSPITAL CARE,LEVL III: ICD-10-PCS | Mod: GC,,, | Performed by: INTERNAL MEDICINE

## 2020-03-04 PROCEDURE — 11000001 HC ACUTE MED/SURG PRIVATE ROOM

## 2020-03-04 PROCEDURE — 99233 SBSQ HOSP IP/OBS HIGH 50: CPT | Mod: ,,, | Performed by: INTERNAL MEDICINE

## 2020-03-04 PROCEDURE — 80048 BASIC METABOLIC PNL TOTAL CA: CPT

## 2020-03-04 PROCEDURE — 82803 BLOOD GASES ANY COMBINATION: CPT

## 2020-03-04 PROCEDURE — 94660 CPAP INITIATION&MGMT: CPT

## 2020-03-04 RX ORDER — FUROSEMIDE 10 MG/ML
40 INJECTION INTRAMUSCULAR; INTRAVENOUS ONCE
Status: COMPLETED | OUTPATIENT
Start: 2020-03-04 | End: 2020-03-04

## 2020-03-04 RX ORDER — IPRATROPIUM BROMIDE AND ALBUTEROL SULFATE 2.5; .5 MG/3ML; MG/3ML
3 SOLUTION RESPIRATORY (INHALATION) EVERY 4 HOURS
Status: DISCONTINUED | OUTPATIENT
Start: 2020-03-04 | End: 2020-03-10 | Stop reason: HOSPADM

## 2020-03-04 RX ORDER — SULFAMETHOXAZOLE AND TRIMETHOPRIM 800; 160 MG/1; MG/1
1 TABLET ORAL
Status: DISCONTINUED | OUTPATIENT
Start: 2020-03-04 | End: 2020-03-10 | Stop reason: HOSPADM

## 2020-03-04 RX ORDER — PREDNISONE 20 MG/1
80 TABLET ORAL DAILY
Status: DISCONTINUED | OUTPATIENT
Start: 2020-03-04 | End: 2020-03-07

## 2020-03-04 RX ORDER — OLANZAPINE 10 MG/2ML
2.5 INJECTION, POWDER, FOR SOLUTION INTRAMUSCULAR ONCE AS NEEDED
Status: DISCONTINUED | OUTPATIENT
Start: 2020-03-05 | End: 2020-03-10 | Stop reason: HOSPADM

## 2020-03-04 RX ADMIN — CEFEPIME 2 G: 2 INJECTION, POWDER, FOR SOLUTION INTRAVENOUS at 06:03

## 2020-03-04 RX ADMIN — FUROSEMIDE 40 MG: 10 INJECTION, SOLUTION INTRAMUSCULAR; INTRAVENOUS at 01:03

## 2020-03-04 RX ADMIN — IPRATROPIUM BROMIDE AND ALBUTEROL SULFATE 3 ML: .5; 3 SOLUTION RESPIRATORY (INHALATION) at 07:03

## 2020-03-04 RX ADMIN — TRAMADOL HYDROCHLORIDE 50 MG: 50 TABLET, FILM COATED ORAL at 12:03

## 2020-03-04 RX ADMIN — PANTOPRAZOLE SODIUM 40 MG: 40 TABLET, DELAYED RELEASE ORAL at 09:03

## 2020-03-04 RX ADMIN — PREDNISONE 80 MG: 20 TABLET ORAL at 10:03

## 2020-03-04 RX ADMIN — ENOXAPARIN SODIUM 40 MG: 100 INJECTION SUBCUTANEOUS at 04:03

## 2020-03-04 RX ADMIN — SULFAMETHOXAZOLE AND TRIMETHOPRIM 1 TABLET: 800; 160 TABLET ORAL at 12:03

## 2020-03-04 RX ADMIN — TRAMADOL HYDROCHLORIDE 50 MG: 50 TABLET, FILM COATED ORAL at 10:03

## 2020-03-04 RX ADMIN — METHYLPREDNISOLONE SODIUM SUCCINATE 80 MG: 40 INJECTION, POWDER, FOR SOLUTION INTRAMUSCULAR; INTRAVENOUS at 10:03

## 2020-03-04 RX ADMIN — ATORVASTATIN CALCIUM 40 MG: 20 TABLET, FILM COATED ORAL at 09:03

## 2020-03-04 RX ADMIN — ASPIRIN 81 MG: 81 TABLET, COATED ORAL at 09:03

## 2020-03-04 RX ADMIN — FUROSEMIDE 40 MG: 40 TABLET ORAL at 09:03

## 2020-03-04 RX ADMIN — HYDROXYCHLOROQUINE SULFATE 200 MG: 200 TABLET, FILM COATED ORAL at 09:03

## 2020-03-04 RX ADMIN — IPRATROPIUM BROMIDE AND ALBUTEROL SULFATE 3 ML: .5; 3 SOLUTION RESPIRATORY (INHALATION) at 11:03

## 2020-03-04 RX ADMIN — PREDNISONE 5 MG: 5 TABLET ORAL at 09:03

## 2020-03-04 RX ADMIN — AZITHROMYCIN MONOHYDRATE 500 MG: 500 INJECTION, POWDER, LYOPHILIZED, FOR SOLUTION INTRAVENOUS at 11:03

## 2020-03-04 RX ADMIN — IPRATROPIUM BROMIDE AND ALBUTEROL SULFATE 3 ML: .5; 3 SOLUTION RESPIRATORY (INHALATION) at 09:03

## 2020-03-04 NOTE — ASSESSMENT & PLAN NOTE
Acute hypoxic respiratory failure associated with fevers and peripheral opacities on CT not inconsistent with her previous episodes of . It is likely that her symptoms are related to a decrease in her prednisone dose as she has flared with this in the past. Of concern is that she could have developed CAP, and with immunosuppression, she is at risk for OIs. She has declined bronchoscopy to further evaluate for infectious causes.  -con't broad spectrum antibiotics + atypical coverage  -induced sputum not yet completed  -escalating steroids to prednisone 1mg/kg

## 2020-03-04 NOTE — CARE UPDATE
Walked into patient room to find her tachypneic(RR 41), C/O SOB with sats 83-84% on 3 liters. O2 increased to 5 liters by  who was @ bedside. Sats increased to 91%. Scheduled aerosol treatment given. RR Therapist called. Will continue to monitor.

## 2020-03-04 NOTE — PROGRESS NOTES
[facility] Hospital Medicine                                                                     Progress Note     Team: [facility] Gerber Shaw MD   Admit Date: 2/27/2020   Hospital Day: 6  LETICIA: 3/5/2020  Code status: Full Code   Principal Problem: Acute respiratory failure with hypoxia     SUMMARY:     Selma Lux is a 84 y.o. 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 She states that her PT was commenting on how much weaker she had gotten as well. The patient does complain of not being able to do the things she would like to anymore and that she was probably going to see a psychiatrist soon. Patient is on valium, gabapentin, and baclofen. Per patients daughter she only takes valium and higher dose of gabapentin at night for sleep. Baclofen is only taken once a day now, per her PCP the last time she saw her in the clinic. In ED, CXR with infiltrates and UA concerning for UTI. Medicine called for admission.     Admitted to hospital medicine for acute metabolic encephalopathy suspected secondary to UTI and CAP. Obtained urine and started on BSabx. Unfortunately for blood cultures not obtained, ordered, NGTD. Urine with E Coli. Hypoglycemia noted, suspecting from infection and poor po intake. Since admission patient has gradually improved with supportive care. Changed IV abx to PO. PT OT rec SNF, plan  was discharge to SNF. Unfortunately 3/2 morning patient was found to be hypoxic Sp02 88% and lethargic. Stat ABG and CTA ordered. No piror hx of FREEDOM. Respiratory panel negative. ABG with respiratory alkalosis. CTA with no PE but noting worsening consolidations, will consult pulmonary. Restarted BSabx. Pulmonary offered bronchoscopy due to hx of , however patient/daughter declined at this time. Plan to continue BSabx for now, and if improving will hold off on increasing home steroids. Patient was attempted on CPAP overnight for concerns of underlying FREEDOM, and patient tolerated well with resultant good sleep as per daughter/patient.    SUBJECTIVE:     Pt was seen and examined at bedside. Only used CPAP for few hrs last night. Pulmonary seen patient this AM and patient was doing well. When see patient this AM she was dyspneic and tachycardiac with sats mid 80 on 2L NC. Mentating okay. Able to protect airways. Aox2 this AM. Lethargic. Rapid called. Increased oxygen eventually placed on Placed on high flow NC. Stat ABG and CXR reviewed. Pulmonary updated. IV solumedrol 80 mg one time given. One time IV lasix given.      ROS (Positive in Bold, otherwise negative)  Pain Scale: 0 /10   Constitutional: fever, chills, night sweats, generalized weakness   CV: chest pain, edema, palpitations  Resp: SOB, cough, sputum production  GI: changes in appetite, NVDC, pain, melena, hematochezia, GERD, hematemesis  : Dysuria, hematuria, urinary urgency, frequency  MSK: arthralgia/myalgia, joint swelling  SKIN: rashes, pruritis, petechiae   Neuro/Psych: FND, anxiety, depression      OBJECTIVE:     Vitals:  Temp:  [98 °F (36.7 °C)-100.5 °F (38.1 °C)]   Pulse:  []   Resp:  [18-41]   BP: ()/(54-58)   SpO2:  [89 %-98 %]      I & O (Last 24H):     Intake/Output Summary (Last 24 hours) at 3/4/2020 1219  Last data filed at 3/4/2020 1020  Gross per 24 hour   Intake 1686 ml   Output 300 ml   Net 1386 ml       GEN: AA female  in no  acute distress. More lethargic this AM. Resting in bed. Cooperative. Calm.  HEENT: NCAT. PERRL. EOMI. Conjunctivae/corneas clear, sclera Anicteric.  CVS: RRR. Normal s1 s2 +murmur, no click, rub or gallop  LUNL NC saturating 83%. Normal respiratory effort. No wheezes. Bibasilar crackles L>R. Scattered rhonchi.  ABD: Normoactive BS, soft, NT, distended, no masses or organomegaly.  EXT: trace LE edema. No cyanosis. Full ROM.  SKIN: color, texture, turgor normal. No rashes or lesions  NEURO: Alert, oriented x 2 this AM, grossly normal, globally weak      All recent labs and imaging has been reviewed.     Recent Results (from the past 24 hour(s))   Procalcitonin    Collection Time: 20 12:49 PM   Result Value Ref Range    Procalcitonin 0.52 (H) <0.25 ng/mL   Basic metabolic panel    Collection Time: 20  4:19 AM   Result Value Ref Range    Sodium 135 (L) 136 - 145 mmol/L    Potassium 3.9 3.5 - 5.1 mmol/L    Chloride 101 95 - 110 mmol/L    CO2 25 23 - 29 mmol/L    Glucose 88 70 - 110 mg/dL    BUN, Bld 16 8 - 23 mg/dL    Creatinine 1.3 0.5 - 1.4 mg/dL    Calcium 8.3 (L) 8.7 - 10.5 mg/dL    Anion Gap 9 8 - 16 mmol/L    eGFR if African American 44.1 (A) >60 mL/min/1.73 m^2    eGFR if non  38.3 (A) >60 mL/min/1.73 m^2   CBC auto differential    Collection Time: 20  4:19 AM   Result Value Ref Range    WBC 14.50 (H) 3.90 - 12.70 K/uL    RBC 3.84 (L) 4.00 - 5.40 M/uL    Hemoglobin 9.7 (L) 12.0 - 16.0 g/dL    Hematocrit 33.2 (L) 37.0 - 48.5 %    Mean Corpuscular Volume 87 82 - 98 fL    Mean Corpuscular Hemoglobin 25.3 (L) 27.0 - 31.0 pg    Mean Corpuscular Hemoglobin Conc 29.2 (L) 32.0 - 36.0 g/dL    RDW 16.2 (H) 11.5 - 14.5 %    Platelets 337 150 - 350 K/uL    MPV 10.1 9.2 - 12.9 fL    Immature Granulocytes 0.6 (H) 0.0 - 0.5 %    Gran # (ANC) 8.8 (H) 1.8 - 7.7 K/uL    Immature Grans (Abs) 0.09 (H) 0.00 - 0.04 K/uL    Lymph # 1.5 1.0 - 4.8 K/uL    Mono # 3.6 (H) 0.3 - 1.0 K/uL    Eos # 0.5 0.0  - 0.5 K/uL    Baso # 0.04 0.00 - 0.20 K/uL    nRBC 0 0 /100 WBC    Gran% 60.7 38.0 - 73.0 %    Lymph% 10.0 (L) 18.0 - 48.0 %    Mono% 24.7 (H) 4.0 - 15.0 %    Eosinophil% 3.7 0.0 - 8.0 %    Basophil% 0.3 0.0 - 1.9 %    Aniso Slight     Poik Slight     Poly Occasional     Hypo Occasional     Ovalocytes Occasional     Maria Eugenia Cells Occasional     Fragmented Cells Occasional     Differential Method Automated    Magnesium    Collection Time: 03/04/20  4:19 AM   Result Value Ref Range    Magnesium 1.7 1.6 - 2.6 mg/dL   Phosphorus    Collection Time: 03/04/20  4:19 AM   Result Value Ref Range    Phosphorus 3.4 2.7 - 4.5 mg/dL   ISTAT PROCEDURE    Collection Time: 03/04/20  9:46 AM   Result Value Ref Range    POC PH 7.460 (H) 7.35 - 7.45    POC PCO2 33.0 (L) 35 - 45 mmHg    POC PO2 61 (L) 80 - 100 mmHg    POC HCO3 23.5 (L) 24 - 28 mmol/L    POC BE 0 -2 to 2 mmol/L    POC SATURATED O2 92 (L) 95 - 100 %    POC TCO2 24 23 - 27 mmol/L    Sample ARTERIAL     Site RR     Allens Test Pass     DelSys Nasal Can        Recent Labs   Lab 03/01/20  0735 03/01/20  1142 03/01/20  1729 03/01/20  2147 03/02/20  1028 03/03/20  1135   POCTGLUCOSE 93 160* 146* 111* 117* 123*       Hemoglobin A1C   Date Value Ref Range Status   08/27/2019 5.6 4.0 - 5.6 % Final     Comment:     ADA Screening Guidelines:  5.7-6.4%  Consistent with prediabetes  >or=6.5%  Consistent with diabetes  High levels of fetal hemoglobin interfere with the HbA1C  assay. Heterozygous hemoglobin variants (HbS, HgC, etc)do  not significantly interfere with this assay.   However, presence of multiple variants may affect accuracy.     03/13/2019 5.7 (H) 4.0 - 5.6 % Final     Comment:     ADA Screening Guidelines:  5.7-6.4%  Consistent with prediabetes  >or=6.5%  Consistent with diabetes  High levels of fetal hemoglobin interfere with the HbA1C  assay. Heterozygous hemoglobin variants (HbS, HgC, etc)do  not significantly interfere with this assay.   However, presence of multiple  variants may affect accuracy.     08/24/2017 5.5 4.0 - 5.6 % Final     Comment:     According to ADA guidelines, hemoglobin A1c <7.0% represents  optimal control in non-pregnant diabetic patients. Different  metrics may apply to specific patient populations.   Standards of Medical Care in Diabetes-2016.  For the purpose of screening for the presence of diabetes:  <5.7%     Consistent with the absence of diabetes  5.7-6.4%  Consistent with increasing risk for diabetes   (prediabetes)  >or=6.5%  Consistent with diabetes  Currently, no consensus exists for use of hemoglobin A1c  for diagnosis of diabetes for children.  This Hemoglobin A1c assay has significant interference with fetal   hemoglobin   (HbF). The results are invalid for patients with abnormal amounts of   HbF,   including those with known Hereditary Persistence   of Fetal Hemoglobin. Heterozygous hemoglobin variants (HbAS, HbAC,   HbAD, HbAE, HbA2) do not significantly interfere with this assay;   however, presence of multiple variants in a sample may impact the %   interference.          Active Hospital Problems    Diagnosis  POA    *Acute respiratory failure with hypoxia [J96.01]  No    Acute cystitis without hematuria [N30.00]  Yes    Arthritis [M19.90]  Yes    Hyperlipidemia [E78.5]  Yes    Multinodular goiter [E04.2]  Yes    AF (paroxysmal atrial fibrillation) [I48.0]  Yes    Generalized weakness [R53.1]  Yes    Hypertension, essential [I10]  Yes    Iron deficiency anemia secondary to inadequate dietary iron intake [D50.8]  Yes      Resolved Hospital Problems   No resolved problems to display.          ASSESSMENT AND PLAN:     Acute hypoxic respiratory failure  Community acquired pneumonia  Cryptogenic organizing pneumonia on chronic prednisone  Pulmonary HTN secondary to ILD  - On admit - CXR with bibasilar infiltrates however no fever or cough  - started on azithro and rocephin which was transitioned to keflex for UTI as patient didn't have  any fever, dyspnea or cough  - 3/2 morning patient was found to be hypoxic Sp02 88% and lethargic.   - Stat ABG and CTA ordered. ABG with respiratory alkalosis  - CTA with worsening consolidations, restarted BSabx for now, and consulted pulmonary for therapy guidance considering her ILD and   - Bronchoscopy by pulm due to hx of , however patient/daughter declined  - Patient was attempted on CPAP overnight for concerns of underlying FREEDOM, and patient tolerated well with resultant good sleep as per daughter/patient. However she refused this overnight  - 3/4 AM hypoxic around mild low 80s, placed on HFNC, CXR and ABG reviewed, discussed with pulmonary. Increased home steroids and will give 80mg IV steroids once as per pulmonary. One dose 40mg IV lasix also provided.   - Duonebs scheduled and supplemental oxygen PRN  - IS provided  - Will need pulmonary fu on discharge     Acute cystitis - resolved  - symptoms concerning for UTI with altered mentation  - UA positive for bacteria and WBC, leuk and nitrates  - given fluids and rocephin  - urine cx with e coli, changed ceftriaxone to keflex - finished tx  - bcx not taken in ED, ordered, NGTD   - afebrile and HDS    Hx of vascular dementia   Acute metabolic encephalopathy  - metabolic secondary to above  - she continued to have intermittent confusion at night, and early AM suspecting from underlying age/dementia  - Patient is on valium, gabapentin, and baclofen, likely contributed to this  - d/c home valium, decrease gabapentin dose, continue prn baclofen]  - ABG with no hypercapnia     Generalized weakness  Multiple falls at home  - secondary to above issues  - PT OT rec SNF  - CM SW updated, they have sent referrals    HFrEF  - last TTE in 8/2019 with 40% EF  - appears compensated at this time  - repeat TTE with EF 55% and grade 1 DD, normal CVP    HTN  - hold home amlodipine for now    RA  - hold cellcept for now due to infection  - continue home plaquenil and  steroids  - resume cellcept tomorrow once off abx    HLD  - continue statin    Leukocytosis  - suspected reactive to above and from steroid use    Long QT interval   - noted    Hypomagnesemia - resolved  - replete and recheck     Hypokalemia - resolved   - replete and recheck       Prophylaxis- lovenox  Code Status- Full Code   Discharge plan and follow up - d/c to SNF as per PT OT once medically stable    Gerber Shaw MD  Hospital Medicine Staff  Pager [phone]

## 2020-03-04 NOTE — NURSING
Notified MD Valeria that pts axillary temp is 100.5. Pt on ABX, 12 L high flow NC, and pulmonology involved in care. Verbal order to continue to monitor pts temp via axillary route. WCTM.

## 2020-03-04 NOTE — PROGRESS NOTES
[facility]  Pulmonology  Progress Note    Patient Name: Selma Lux  MRN: [id number]  Admission Date: 2/27/2020  Hospital Length of Stay: 6 days  Code Status: Full Code  Attending Provider: Gerber Shaw MD  Primary Care Provider: Bhargav Hirsch MD   Principal Problem: Acute respiratory failure with hypoxia    Subjective:     Interval History: No new symptoms today. No subjective fevers. Breathing is unchanged. Minimal cough without significant sputum.    Objective:     Vital Signs (Most Recent):  Temp: 99.2 °F (37.3 °C) (03/04/20 0353)  Pulse: 101 (03/04/20 0809)  Resp: (!) 21 (03/04/20 0424)  BP: (!) 110/58 (03/04/20 0353)  SpO2: 98 % (03/04/20 0424) Vital Signs (24h Range):  Temp:  [98 °F (36.7 °C)-99.2 °F (37.3 °C)] 99.2 °F (37.3 °C)  Pulse:  [] 101  Resp:  [18-22] 21  SpO2:  [94 %-98 %] 98 %  BP: ()/(54-58) 110/58     Weight: 84.4 kg (186 lb)  Body mass index is 31.93 kg/m².      Intake/Output Summary (Last 24 hours) at 3/4/2020 0851  Last data filed at 3/4/2020 0700  Gross per 24 hour   Intake 1816 ml   Output 300 ml   Net 1516 ml       Physical Exam   Constitutional: She appears well-developed and well-nourished. No distress.   HENT:   Head: Normocephalic and atraumatic.   Eyes: No scleral icterus.   Neck: Neck supple.   Cardiovascular: Normal rate and regular rhythm.   Pulmonary/Chest:   Breathing comfortably, 91% on 3L O2. CTAB.   Abdominal: She exhibits no distension. There is no tenderness.   Musculoskeletal: She exhibits no edema.   Neurological: She is alert. Coordination normal.   Skin: Skin is warm and dry. No rash noted.         Lines/Drains/Airways     Drain            Female External Urinary Catheter 02/29/20 0600 4 days          Peripheral Intravenous Line                 Peripheral IV - Single Lumen 03/03/20 1540 20 G;1 3/4 in Right Forearm less than 1 day                Significant Labs:    CBC/Anemia Profile:  Recent Labs   Lab 03/03/20  0825  03/04/20 0419   WBC 14.57* 14.50*   HGB 9.8* 9.7*   HCT 33.0* 33.2*    337   MCV 87 87   RDW 16.0* 16.2*        Chemistries:  Recent Labs   Lab 03/03/20 0459 03/04/20 0419    135*   K 3.9 3.9    101   CO2 24 25   BUN 15 16   CREATININE 1.4 1.3   CALCIUM 8.3* 8.3*   MG 1.6 1.7   PHOS 4.0 3.4     procalcitonin 0.52    All pertinent labs within the past 24 hours have been reviewed.      Assessment/Plan:     * Acute respiratory failure with hypoxia  Acute hypoxic respiratory failure associated with fevers and peripheral opacities on CT not inconsistent with her previous episodes of . It is likely that her symptoms are related to a decrease in her prednisone dose as she has flared with this in the past. Of concern is that she could have developed CAP, and with immunosuppression, she is at risk for OIs. She has declined bronchoscopy to further evaluate for infectious causes.  -con't broad spectrum antibiotics + atypical coverage  -induced sputum not yet completed  -escalating steroids to prednisone 1mg/kg           Sadia Bennett, DO  Pulmonology  [facility]

## 2020-03-04 NOTE — NURSING
Per Dr. Shaw request the respiratory therapist called and requested the patient be removed from high flow O2 to comfort level O2. The Resp Therapist stated she will change the rate when she returns to the unit.

## 2020-03-04 NOTE — PLAN OF CARE
Problem: Occupational Therapy Goal  Goal: Occupational Therapy Goal  Description  Goals to be met by: 3/8/20    Patient will increase functional independence with ADLs by performing:    Feeding with Set-up Assistance.- progressing   UE Dressing with Minimal Assistance.  LE Dressing with Minimal Assistance.- initiated   Grooming while standing at bedside with Minimal Assistance with AD as needed to prepare for task in standing at sink.  Toileting from bedside commode with Minimal Assistance for hygiene and clothing management.   Stand pivot transfers with Minimal Assistance to reach stable surface.  Toilet transfer to bedside commode with Minimal Assistance.      Outcome: Ongoing, Progressing     Pt slowly progressing towards OT goals. OT POC remains appropriate for patient on this date. Pt will continue to benefit from skilled OT to address the deficits affecting her occupational performance.    Cora Oliver OTR/L  3/4/20

## 2020-03-04 NOTE — PLAN OF CARE
Problem: Fall Injury Risk  Goal: Absence of Fall and Fall-Related Injury  Outcome: Ongoing, Progressing     Problem: Adult Inpatient Plan of Care  Goal: Plan of Care Review  Outcome: Ongoing, Progressing  Goal: Patient-Specific Goal (Individualization)  Outcome: Ongoing, Progressing  Goal: Absence of Hospital-Acquired Illness or Injury  Outcome: Ongoing, Progressing  Goal: Optimal Comfort and Wellbeing  Outcome: Ongoing, Progressing  Goal: Readiness for Transition of Care  Outcome: Ongoing, Progressing  Goal: Rounds/Family Conference  Outcome: Ongoing, Progressing     Problem: Infection  Goal: Infection Symptom Resolution  Outcome: Ongoing, Progressing     Problem: Skin Injury Risk Increased  Goal: Skin Health and Integrity  Outcome: Ongoing, Progressing    Pt a&ox3-4, sometimes forgetful. POC discussed with pt and family. VSS on 2LO2 NC, unable to tolerate bipap for more than a couple of hours overnight. Regular diet, fluids encouraged, voids via purewick or bedpan. Avasys bedside. LBM 3/1 - having cramping abd pain. Bentyl and tramadol prn. Miralax given. Will monitor.

## 2020-03-04 NOTE — PT/OT/SLP PROGRESS
"Physical Therapy Treatment    Patient Name:  Selma Lux   MRN:  [id number]    Recommendations:     Discharge Recommendations:  nursing facility, skilled   Discharge Equipment Recommendations: other (see comments)(TBD)   Barriers to discharge: Inaccessible home and Decreased caregiver support    Assessment:     Selma Lux is a 84 y.o. female admitted with a medical diagnosis of Acute respiratory failure with hypoxia.  She presents with the following impairments/functional limitations:  weakness, impaired endurance, impaired self care skills, impaired functional mobilty, gait instability, impaired balance, decreased upper extremity function, decreased lower extremity function, decreased safety awareness, pain, decreased ROM, edema, impaired cardiopulmonary response to activity Patient tolerated treatment fairly focusing on bed mobility and sitting balance/tolerance. Patient will continue to improve with skilled physical therapy services in order to return to functional baseline.    Rehab Prognosis: Fair; patient would benefit from acute skilled PT services to address these deficits and reach maximum level of function.    Recent Surgery: * No surgery found *      Plan:     During this hospitalization, patient to be seen 4 x/week to address the identified rehab impairments via gait training, therapeutic activities, therapeutic exercises, neuromuscular re-education and progress toward the following goals:    · Plan of Care Expires:  03/28/20    Subjective     Chief Complaint: pain everywhere with any mvmt  Pain/Comfort:  · Pain Rating 1: (not rated, increased with any mvmt)  · Location 1: other (see comments)("everywhere")      Objective:     Communicated with nursing prior to session.  Patient found HOB elevated with telemetry, PureWick, peripheral IV, oxygen upon PT entry to room.     General Precautions: Standard, fall   Orthopedic Precautions:N/A   Braces: N/A     Functional Mobility:  · Bed " Mobility:     · Rolling Left:  total assistance  · Scooting: total assistance and of 2 persons  · Supine to Sit: total assistance and of 2 persons  · Sit to Supine: total assistance and of 2 persons  · Balance: seated EOB ~8 minutes total time focusing on upright posturre, pt with posterior and L lateral lean required Max A to maintain balance including B knee blocking, pt limited in activity due to increased pain mostly in abdominals      AM-PAC 6 CLICK MOBILITY  Turning over in bed (including adjusting bedclothes, sheets and blankets)?: 2  Sitting down on and standing up from a chair with arms (e.g., wheelchair, bedside commode, etc.): 2  Moving from lying on back to sitting on the side of the bed?: 2  Moving to and from a bed to a chair (including a wheelchair)?: 2  Need to walk in hospital room?: 1  Climbing 3-5 steps with a railing?: 1  Basic Mobility Total Score: 10       Therapeutic Activities and Exercises:   1 x 15 AP, pt not able to tolerate any additional TE due to increased pain    Patient left HOB elevated with call button in reach and nurse present..    GOALS:   Multidisciplinary Problems     Physical Therapy Goals        Problem: Physical Therapy Goal    Goal Priority Disciplines Outcome Goal Variances Interventions   Physical Therapy Goal     PT, PT/OT Ongoing, Progressing     Description:  Goals to be met by: 3/13/2020     Patient will increase functional independence with mobility by performin. Supine to sit with Contact Guard Assistance  2. Sit to supine with Contact Guard Assistance  3. Sit to stand transfer with Minimal Assistance using Rolling Walker or LRAD  4. Bed to chair transfer with Minimal Assistance using Rolling Walker or LRAD  5. Gait  x 25 feet with Contact Guard Assistance using Rolling Walker.   6. Sitting at edge of bed x10 minutes with Stand-by Assistance  7. Lower extremity exercise program x20 reps per handout, with independence                      Time Tracking:     PT  Received On: 03/04/20  PT Start Time: 1031     PT Stop Time: 1058  PT Total Time (min): 27 min     Billable Minutes: Therapeutic Activity 27    Treatment Type: Treatment  PT/PTA: PTA     PTA Visit Number: 1     Lila Angeles PTA  03/04/2020

## 2020-03-04 NOTE — CARE UPDATE
Rapid Response Nurse Chart Check     Chart check completed, abnormal VS noted.   Bedside RN Ratna ([id number]) contacted, no concerns verbalized at this time.  Pulm following.  SpO2 goal >89%. Currently 96% on 12 L high flow.  Steroid dosage increased.   Currently on vanc, cefepime, azithro, and bactrim.  Instructed to call [phone] for further concerns or assistance.

## 2020-03-04 NOTE — PT/OT/SLP PROGRESS
Occupational Therapy   Treatment    Name: Selma Lux  MRN: [id number]  Admitting Diagnosis:  Acute respiratory failure with hypoxia       Recommendations:     Discharge Recommendations: nursing facility, skilled  Discharge Equipment Recommendations:  (TBD pending progress)  Barriers to discharge:  Decreased caregiver support    Assessment:     Selma Lux is a 84 y.o. female with a medical diagnosis of Acute respiratory failure with hypoxia.  She presents with the following performance deficits affecting function: weakness, impaired endurance, pain, impaired self care skills, decreased coordination, decreased upper extremity function, decreased lower extremity function, impaired cardiopulmonary response to activity, impaired balance, impaired cognition, decreased safety awareness. Pt did not respond well to today's session 2/2 pain and impaired cardiopulmonary response to activity. Pt limited to sitting EOB for ~7-8 minutes due to the deficits listed above. Pt with a goal to sit UIC this date; however, chair transfer from bed deferred this date due to decreased activity tolerance seated EOB. Pt with limited progress noted towards goals. OT POC remains appropriate for patient on this date. Pt will continue to benefit from skilled OT to address the deficits listed above.     Rehab Prognosis:  Good; patient would benefit from acute skilled OT services to address these deficits and reach maximum level of function.       Plan:     Patient to be seen 4 x/week to address the above listed problems via self-care/home management, therapeutic activities, therapeutic exercises, neuromuscular re-education  · Plan of Care Expires: 03/28/20  · Plan of Care Reviewed with: patient    Subjective     Pain/Comfort:  · Pain Rating 1: (pt did not rate; pt c/o pain all over predominantly in abdomen)  · Pain Addressed 1: Pre-medicate for activity, Reposition  · Pain Rating Post-Intervention 1: (pt did not rate; pain  remained throughout session)  · Pain Addressed 2: Reposition, Cessation of Activity, Nurse notified    Objective:     Communicated with: RN prior to session.  Patient found HOB elevated with telemetry, PureWick, oxygen, peripheral IV upon OT entry to room.    General Precautions: Standard, fall   Orthopedic Precautions:N/A   Braces: N/A     Occupational Performance:     Bed Mobility:    · Patient completed Rolling/Turning to Left with  total assistance and 2 persons  · Patient completed Rolling/Turning to Right with total assistance and 2 persons  · Patient completed Scooting/Bridging with total assistance and 2 persons  · Patient completed Supine to Sit with total assistance and 2 persons  · Patient completed Sit to Supine with total assistance and 2 persons      Functional Mobility/Transfers:  · Not performed 2/2 pt with decreased tolerance for sitting EOB due to pain   · Rapid breathing noted EOB; pt did not respond well to pursed lip breathing     Activities of Daily Living:  · Lower Body Dressing: total assistance to don socks seated EOB  · Toileting: dependence with use of purewick for voiding      [facility] 6 Click ADL: 11    Treatment & Education:  - Role of OT/ OT POC  - Self care safety/ independence  - Bed mobility safety  - Pursed lip breathing  - Importance of sitting EOB to progress towards sitting UIC  - Pt tolerated ~7-8 minutes seated EOB with max A for posterior positioning with PT present anteriorly blocking BLEs to prevent sliding from bed. Pt noted to have a tendency of posterior pushing due to abdominal pain when sitting upright. Pt required cuing for upright posture and BLE placement on stable surface to assist with balance EOB. Pursed lip breathing encouraged EOB due to rapid breathing; pt demo'd the inability to follow through with technique. 02 remained donned throughout the session. Pt returned to supine position with total A of 2 persons.   - Educated RN on safety concerns with pt not  transferring to medi-chair on this date 2/2 pain and rapid breathing     Patient left HOB elevated with all lines intact, call button in reach, RN notified and RN presentEducation:      GOALS:   Multidisciplinary Problems     Occupational Therapy Goals        Problem: Occupational Therapy Goal    Goal Priority Disciplines Outcome Interventions   Occupational Therapy Goal     OT, PT/OT Ongoing, Progressing    Description:  Goals to be met by: 3/8/20    Patient will increase functional independence with ADLs by performing:    Feeding with Set-up Assistance.- progressing   UE Dressing with Minimal Assistance.  LE Dressing with Minimal Assistance.- initiated   Grooming while standing at bedside with Minimal Assistance with AD as needed to prepare for task in standing at sink.  Toileting from bedside commode with Minimal Assistance for hygiene and clothing management.   Stand pivot transfers with Minimal Assistance to reach stable surface.  Toilet transfer to bedside commode with Minimal Assistance.                       Time Tracking:     OT Date of Treatment: 03/04/20  OT Start Time: 1031  OT Stop Time: 1058  OT Total Time (min): 27 min    Billable Minutes:Therapeutic Activity 17  Neuromuscular Re-education 10    Cora Oliver OT  3/4/2020

## 2020-03-04 NOTE — SUBJECTIVE & OBJECTIVE
Interval History: No new symptoms today. No subjective fevers. Breathing is unchanged. Minimal cough without significant sputum.    Objective:     Vital Signs (Most Recent):  Temp: 99.2 °F (37.3 °C) (03/04/20 0353)  Pulse: 101 (03/04/20 0809)  Resp: (!) 21 (03/04/20 0424)  BP: (!) 110/58 (03/04/20 0353)  SpO2: 98 % (03/04/20 0424) Vital Signs (24h Range):  Temp:  [98 °F (36.7 °C)-99.2 °F (37.3 °C)] 99.2 °F (37.3 °C)  Pulse:  [] 101  Resp:  [18-22] 21  SpO2:  [94 %-98 %] 98 %  BP: ()/(54-58) 110/58     Weight: 84.4 kg (186 lb)  Body mass index is 31.93 kg/m².      Intake/Output Summary (Last 24 hours) at 3/4/2020 0851  Last data filed at 3/4/2020 0700  Gross per 24 hour   Intake 1816 ml   Output 300 ml   Net 1516 ml       Physical Exam   Constitutional: She appears well-developed and well-nourished. No distress.   HENT:   Head: Normocephalic and atraumatic.   Eyes: No scleral icterus.   Neck: Neck supple.   Cardiovascular: Normal rate and regular rhythm.   Pulmonary/Chest:   Breathing comfortably, 91% on 3L O2. CTAB.   Abdominal: She exhibits no distension. There is no tenderness.   Musculoskeletal: She exhibits no edema.   Neurological: She is alert. Coordination normal.   Skin: Skin is warm and dry. No rash noted.         Lines/Drains/Airways     Drain            Female External Urinary Catheter 02/29/20 0600 4 days          Peripheral Intravenous Line                 Peripheral IV - Single Lumen 03/03/20 1540 20 G;1 3/4 in Right Forearm less than 1 day                Significant Labs:    CBC/Anemia Profile:  Recent Labs   Lab 03/03/20  0817 03/04/20  0419   WBC 14.57* 14.50*   HGB 9.8* 9.7*   HCT 33.0* 33.2*    337   MCV 87 87   RDW 16.0* 16.2*        Chemistries:  Recent Labs   Lab 03/03/20  0459 03/04/20  0419    135*   K 3.9 3.9    101   CO2 24 25   BUN 15 16   CREATININE 1.4 1.3   CALCIUM 8.3* 8.3*   MG 1.6 1.7   PHOS 4.0 3.4     procalcitonin 0.52    All pertinent labs within  the past 24 hours have been reviewed.

## 2020-03-05 LAB
ANION GAP SERPL CALC-SCNC: 9 MMOL/L (ref 8–16)
BASOPHILS # BLD AUTO: 0.01 K/UL (ref 0–0.2)
BASOPHILS NFR BLD: 0.1 % (ref 0–1.9)
BUN SERPL-MCNC: 20 MG/DL (ref 8–23)
CALCIUM SERPL-MCNC: 8.6 MG/DL (ref 8.7–10.5)
CHLORIDE SERPL-SCNC: 99 MMOL/L (ref 95–110)
CO2 SERPL-SCNC: 26 MMOL/L (ref 23–29)
CREAT SERPL-MCNC: 1.3 MG/DL (ref 0.5–1.4)
DIFFERENTIAL METHOD: ABNORMAL
EOSINOPHIL # BLD AUTO: 0 K/UL (ref 0–0.5)
EOSINOPHIL NFR BLD: 0 % (ref 0–8)
ERYTHROCYTE [DISTWIDTH] IN BLOOD BY AUTOMATED COUNT: 16.2 % (ref 11.5–14.5)
EST. GFR  (AFRICAN AMERICAN): 44.1 ML/MIN/1.73 M^2
EST. GFR  (NON AFRICAN AMERICAN): 38.3 ML/MIN/1.73 M^2
GLUCOSE SERPL-MCNC: 168 MG/DL (ref 70–110)
HCT VFR BLD AUTO: 31.8 % (ref 37–48.5)
HGB BLD-MCNC: 9.5 G/DL (ref 12–16)
IMM GRANULOCYTES # BLD AUTO: 0.04 K/UL (ref 0–0.04)
IMM GRANULOCYTES NFR BLD AUTO: 0.5 % (ref 0–0.5)
LYMPHOCYTES # BLD AUTO: 0.8 K/UL (ref 1–4.8)
LYMPHOCYTES NFR BLD: 9.8 % (ref 18–48)
MAGNESIUM SERPL-MCNC: 1.8 MG/DL (ref 1.6–2.6)
MCH RBC QN AUTO: 25.7 PG (ref 27–31)
MCHC RBC AUTO-ENTMCNC: 29.9 G/DL (ref 32–36)
MCV RBC AUTO: 86 FL (ref 82–98)
MONOCYTES # BLD AUTO: 1 K/UL (ref 0.3–1)
MONOCYTES NFR BLD: 12.5 % (ref 4–15)
NEUTROPHILS # BLD AUTO: 6.4 K/UL (ref 1.8–7.7)
NEUTROPHILS NFR BLD: 77.1 % (ref 38–73)
NRBC BLD-RTO: 0 /100 WBC
PHOSPHATE SERPL-MCNC: 4 MG/DL (ref 2.7–4.5)
PLATELET # BLD AUTO: 352 K/UL (ref 150–350)
PMV BLD AUTO: 10.2 FL (ref 9.2–12.9)
POTASSIUM SERPL-SCNC: 4.2 MMOL/L (ref 3.5–5.1)
RBC # BLD AUTO: 3.69 M/UL (ref 4–5.4)
SODIUM SERPL-SCNC: 134 MMOL/L (ref 136–145)
WBC # BLD AUTO: 8.27 K/UL (ref 3.9–12.7)

## 2020-03-05 PROCEDURE — 27100092 HC HIGH FLOW DELIVERY CANNULA

## 2020-03-05 PROCEDURE — 27000221 HC OXYGEN, UP TO 24 HOURS

## 2020-03-05 PROCEDURE — 99232 SBSQ HOSP IP/OBS MODERATE 35: CPT | Mod: ,,, | Performed by: INTERNAL MEDICINE

## 2020-03-05 PROCEDURE — 87205 SMEAR GRAM STAIN: CPT

## 2020-03-05 PROCEDURE — 25000242 PHARM REV CODE 250 ALT 637 W/ HCPCS: Performed by: INTERNAL MEDICINE

## 2020-03-05 PROCEDURE — 83735 ASSAY OF MAGNESIUM: CPT

## 2020-03-05 PROCEDURE — 84100 ASSAY OF PHOSPHORUS: CPT

## 2020-03-05 PROCEDURE — 99900035 HC TECH TIME PER 15 MIN (STAT)

## 2020-03-05 PROCEDURE — 94799 UNLISTED PULMONARY SVC/PX: CPT

## 2020-03-05 PROCEDURE — 63600175 PHARM REV CODE 636 W HCPCS: Performed by: INTERNAL MEDICINE

## 2020-03-05 PROCEDURE — 94761 N-INVAS EAR/PLS OXIMETRY MLT: CPT

## 2020-03-05 PROCEDURE — 25000003 PHARM REV CODE 250: Performed by: INTERNAL MEDICINE

## 2020-03-05 PROCEDURE — 11000001 HC ACUTE MED/SURG PRIVATE ROOM

## 2020-03-05 PROCEDURE — 94660 CPAP INITIATION&MGMT: CPT

## 2020-03-05 PROCEDURE — 94640 AIRWAY INHALATION TREATMENT: CPT

## 2020-03-05 PROCEDURE — 99233 PR SUBSEQUENT HOSPITAL CARE,LEVL III: ICD-10-PCS | Mod: GC,,, | Performed by: INTERNAL MEDICINE

## 2020-03-05 PROCEDURE — 27100171 HC OXYGEN HIGH FLOW UP TO 24 HOURS

## 2020-03-05 PROCEDURE — 99232 PR SUBSEQUENT HOSPITAL CARE,LEVL II: ICD-10-PCS | Mod: ,,, | Performed by: INTERNAL MEDICINE

## 2020-03-05 PROCEDURE — 27000190 HC CPAP FULL FACE MASK W/VALVE

## 2020-03-05 PROCEDURE — 36415 COLL VENOUS BLD VENIPUNCTURE: CPT

## 2020-03-05 PROCEDURE — 99233 SBSQ HOSP IP/OBS HIGH 50: CPT | Mod: GC,,, | Performed by: INTERNAL MEDICINE

## 2020-03-05 PROCEDURE — 85025 COMPLETE CBC W/AUTO DIFF WBC: CPT

## 2020-03-05 PROCEDURE — 87070 CULTURE OTHR SPECIMN AEROBIC: CPT

## 2020-03-05 PROCEDURE — 80048 BASIC METABOLIC PNL TOTAL CA: CPT

## 2020-03-05 RX ADMIN — IPRATROPIUM BROMIDE AND ALBUTEROL SULFATE 3 ML: .5; 3 SOLUTION RESPIRATORY (INHALATION) at 09:03

## 2020-03-05 RX ADMIN — POLYETHYLENE GLYCOL 3350 17 G: 17 POWDER, FOR SOLUTION ORAL at 10:03

## 2020-03-05 RX ADMIN — HYDROXYCHLOROQUINE SULFATE 200 MG: 200 TABLET, FILM COATED ORAL at 09:03

## 2020-03-05 RX ADMIN — IPRATROPIUM BROMIDE AND ALBUTEROL SULFATE 3 ML: .5; 3 SOLUTION RESPIRATORY (INHALATION) at 12:03

## 2020-03-05 RX ADMIN — PREDNISONE 80 MG: 20 TABLET ORAL at 09:03

## 2020-03-05 RX ADMIN — IPRATROPIUM BROMIDE AND ALBUTEROL SULFATE 3 ML: .5; 3 SOLUTION RESPIRATORY (INHALATION) at 08:03

## 2020-03-05 RX ADMIN — IPRATROPIUM BROMIDE AND ALBUTEROL SULFATE 3 ML: .5; 3 SOLUTION RESPIRATORY (INHALATION) at 11:03

## 2020-03-05 RX ADMIN — ASPIRIN 81 MG: 81 TABLET, COATED ORAL at 09:03

## 2020-03-05 RX ADMIN — PANTOPRAZOLE SODIUM 40 MG: 40 TABLET, DELAYED RELEASE ORAL at 09:03

## 2020-03-05 RX ADMIN — CEFEPIME 2 G: 2 INJECTION, POWDER, FOR SOLUTION INTRAVENOUS at 07:03

## 2020-03-05 RX ADMIN — ENOXAPARIN SODIUM 40 MG: 100 INJECTION SUBCUTANEOUS at 04:03

## 2020-03-05 RX ADMIN — AZITHROMYCIN MONOHYDRATE 500 MG: 500 INJECTION, POWDER, LYOPHILIZED, FOR SOLUTION INTRAVENOUS at 12:03

## 2020-03-05 RX ADMIN — CEFEPIME 2 G: 2 INJECTION, POWDER, FOR SOLUTION INTRAVENOUS at 06:03

## 2020-03-05 RX ADMIN — IPRATROPIUM BROMIDE AND ALBUTEROL SULFATE 3 ML: .5; 3 SOLUTION RESPIRATORY (INHALATION) at 05:03

## 2020-03-05 RX ADMIN — VANCOMYCIN HYDROCHLORIDE 1250 MG: 1.25 INJECTION, POWDER, LYOPHILIZED, FOR SOLUTION INTRAVENOUS at 01:03

## 2020-03-05 RX ADMIN — FUROSEMIDE 40 MG: 40 TABLET ORAL at 09:03

## 2020-03-05 RX ADMIN — ATORVASTATIN CALCIUM 40 MG: 20 TABLET, FILM COATED ORAL at 09:03

## 2020-03-05 NOTE — NURSING
Patient report received at the bedside from Xiao SANDERS the patient is awake and alert daughter at the bedside denies any pain or discomfort at this time patient repositioned in bed Purwick changed POC reviewed with both the patient and her daughter at this time.

## 2020-03-05 NOTE — PROGRESS NOTES
Pharmacokinetic Assessment Follow Up: IV Vancomycin    Vancomycin serum concentration assessment(s):    The random level was drawn correctly and can be used to guide therapy at this time. The measurement is below the desired definitive target range of 15 to 20 mcg/mL.    Vancomycin Regimen Plan:    Administer one dose of 1250 mg at this time; and re-dose when random vancomycin level is below 20 mcg/ml.  Next lab to be drawn on 3/6/20 with am lab draw (430)  Desired trough concentration is 15-20 mcg/ml.    Drug levels (last 3 results):  Recent Labs   Lab Result Units 03/04/20  2211   Vancomycin-Trough ug/mL 12.7       Pharmacy will continue to follow and monitor vancomycin.    Please contact pharmacy at extension [phone] for questions regarding this assessment.    Thank you for the consult,   Melo Lacy       Patient brief summary:  Selma Lux is a 84 y.o. female initiated on antimicrobial therapy with IV Vancomycin for treatment of lower respiratory infection        Drug Allergies:   Review of patient's allergies indicates:  No Known Allergies    Actual Body Weight:   84.4 kg    Renal Function:   Estimated Creatinine Clearance: 35.1 mL/min (based on SCr of 1.3 mg/dL).,     Dialysis Method (if applicable):  N/A    CBC (last 72 hours):  Recent Labs   Lab Result Units 03/02/20  0833 03/03/20  0817 03/04/20  0419   WBC K/uL 15.04* 14.57* 14.50*   Hemoglobin g/dL 10.7* 9.8* 9.7*   Hematocrit % 36.3* 33.0* 33.2*   Platelets K/uL 314 284 337   Gran% % 56.8 55.6 60.7   Lymph% % 10.9* 11.2* 10.0*   Mono% % 28.3* 28.5* 24.7*   Eosinophil% % 3.3 3.9 3.7   Basophil% % 0.2 0.3 0.3   Differential Method  Automated Automated Automated       Metabolic Panel (last 72 hours):  Recent Labs   Lab Result Units 03/02/20  0439 03/03/20  0459 03/04/20  0419   Sodium mmol/L 138 137 135*   Potassium mmol/L 3.9 3.9 3.9   Chloride mmol/L 103 101 101   CO2 mmol/L 25 24 25   Glucose mg/dL 84 83 88   BUN, Bld mg/dL 11 15 16    Creatinine mg/dL 1.1 1.4 1.3   Magnesium mg/dL 1.6 1.6 1.7   Phosphorus mg/dL 3.6 4.0 3.4       Vancomycin Administrations:  vancomycin given in the last 96 hours                   vancomycin 1.25 g in dextrose 5% 250 mL IVPB (ready to mix) (mg) 1,250 mg New Bag 03/03/20 1934     1,250 mg New Bag 03/02/20 1959                Microbiologic Results:  Microbiology Results (last 7 days)     Procedure Component Value Units Date/Time    Blood culture [[id number]] Collected:  02/28/20 0926    Order Status:  Completed Specimen:  Blood Updated:  03/04/20 1012     Blood Culture, Routine No growth after 5 days.    Narrative:       1st site  Collection has been rescheduled by TS2 at 02/28/2020 08:16 Reason: pt   getting cleaned   Collection has been rescheduled by TS2 at 02/28/2020 08:16 Reason: pt   getting cleaned     Culture, Respiratory with Gram Stain [[id number]]     Order Status:  No result Specimen:  Respiratory     Respiratory Infection Panel, Nasopharyngeal [[id number]] Collected:  03/02/20 0910    Order Status:  Completed Specimen:  Nasopharyngeal Swab Updated:  03/02/20 1057     Respiratory Infection Panel Source NP Swab     Adenovirus Not Detected     Coronavirus 229E, Common Cold Virus Not Detected     Coronavirus HKU1, Common Cold Virus Not Detected     Coronavirus NL63, Common Cold Virus Not Detected     Coronavirus OC43, Common Cold Virus Not Detected     Comment: The Coronavirus strains detected in this test cause the common cold.  These strains are not the COVID-19 (novel Coronavirus)strain   associated with the respiratory disease outbreak.          Human Metapneumovirus Not Detected     Human Rhinovirus/Enterovirus Not Detected     Influenza A (subtypes H1, H1-2009,H3) Not Detected     Influenza B Not Detected     Parainfluenza Virus 1 Not Detected     Parainfluenza Virus 2 Not Detected     Parainfluenza Virus 3 Not Detected     Parainfluenza Virus 4 Not Detected     Respiratory Syncytial Virus Not Detected      Bordetella Parapertussis (IQ6270) Not Detected     Bordetella pertussis (ptxP) Not Detected     Chlamydia pneumoniae Not Detected     Mycoplasma pneumoniae Not Detected     Comment: Respiratory Infection Panel testing performed by Multiplex PCR.       Narrative:       For all other respiratory sources order [id number] Respiratory  Viral Panel by PCR (RVPCR)    Culture, Respiratory with Gram Stain [[id number]] Collected:  03/02/20 0554    Order Status:  Completed Specimen:  Respiratory from Sputum Updated:  03/02/20 0927     Respiratory Culture Specimen inadequate - culture not performed     Gram Stain (Respiratory) >10epis/lfp and <than many WBC's     Gram Stain (Respiratory) Predominance of oropharyngeal carlos. Please recollect.    Urine culture [[id number]]  (Abnormal)  (Susceptibility) Collected:  02/27/20 1956    Order Status:  Completed Specimen:  Urine Updated:  02/29/20 2009     Urine Culture, Routine ESCHERICHIA COLI  >100,000 cfu/ml      Narrative:       Preferred Collection Type->Urine, Clean Catch    Blood culture [[id number]] Collected:  02/28/20 0922    Order Status:  Sent Specimen:  Blood Updated:  02/28/20 0926    Narrative:       Collection has been rescheduled by TS2 at 02/28/2020 08:16 Reason: pt   getting cleaned   Collection has been rescheduled by TS2 at 02/28/2020 08:16 Reason: pt   getting cleaned

## 2020-03-05 NOTE — NURSING
Family requested that pt get up in the bedside chair. She was transferred x2 assist. All concerns during the day were addressed. Daughter spoke with the MD and the  also relayed the progress with pt's care.

## 2020-03-05 NOTE — PROGRESS NOTES
[facility] Hospital Medicine                                                                     Progress Note     Team: [facility] HOSP MED G Gerber Shaw MD   Admit Date: 2/27/2020   Hospital Day: 7  LETICIA: 3/9/2020  Code status: Full Code   Principal Problem: Acute respiratory failure with hypoxia     SUMMARY:     Selma Lux is a 84 y.o. 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 She states that her PT was commenting on how much weaker she had gotten as well. The patient does complain of not being able to do the things she would like to anymore and that she was probably going to see a psychiatrist soon. Patient is on valium, gabapentin, and baclofen. Per patients daughter she only takes valium and higher dose of gabapentin at night for sleep. Baclofen is only taken once a day now, per her PCP the last time she saw her in the clinic. In ED, CXR with infiltrates and UA concerning for UTI. Medicine called for admission.     Admitted to hospital medicine for acute metabolic encephalopathy suspected secondary to UTI and CAP. Obtained urine and started on BSabx. Unfortunately for blood cultures not obtained, ordered, NGTD. Urine with E Coli. Hypoglycemia noted, suspecting from infection and poor po intake. Since admission patient has gradually improved with supportive care. Changed IV abx to PO. PT OT rec SNF, plan  was discharge to SNF. Unfortunately 3/2 morning patient was found to be hypoxic Sp02 88% and lethargic. Stat ABG and CTA ordered. No piror hx of FREEDOM. Respiratory panel negative. ABG with respiratory alkalosis. CTA with no PE but noting worsening consolidations, will consult pulmonary. Restarted BSabx. Pulmonary offered bronchoscopy due to hx of , however patient/daughter declined at this time. Plan to continue BSabx for now, and now increased home steroids as per pulmonary. Patient was also attempted on CPAP overnight for concerns of underlying FREEDOM, and patient tolerated well with resultant good sleep as per daughter/patient.    SUBJECTIVE:     Pt was seen and examined at bedside. No acute events overnight. No new complaints this AM. Daughter at bedside stated patient had good sleep on CPAP overnight. Patient appears Aox3 this AM. Family at bedside updated on POC.    ROS (Positive in Bold, otherwise negative)  Pain Scale: 0 /10   Constitutional: fever, chills, night sweats, generalized weakness   CV: chest pain, edema, palpitations  Resp: SOB, cough, sputum production  GI: changes in appetite, NVDC, pain, melena, hematochezia, GERD, hematemesis  : Dysuria, hematuria, urinary urgency, frequency  MSK: arthralgia/myalgia, joint swelling  SKIN: rashes, pruritis, petechiae   Neuro/Psych: FND, anxiety, depression      OBJECTIVE:     Vitals:  Temp:  [96.8 °F (36 °C)-98.7 °F (37.1 °C)]   Pulse:  []   Resp:  [15-25]   BP: (115-128)/(56-66)   SpO2:  [91 %-100 %]      I & O (Last 24H):     Intake/Output Summary (Last 24 hours) at 3/5/2020 1621  Last data filed at 3/5/2020 1200  Gross per 24 hour   Intake --   Output 1150 ml   Net -1150 ml       GEN: AA female  in no acute distress. Resting in bed. Cooperative. Calm.  HEENT: NCAT. PERRL. EOMI. Conjunctivae/corneas clear, sclera Anicteric.  CVS: RRR. Normal s1 s2 +murmur, no click, rub or gallop  LUNG: 3L NC saturating 93%. Normal respiratory effort. No wheezes.  Improving Bibasilar crackles L>R.  ABD: Normoactive BS, soft, NT, distended, no masses or organomegaly.  EXT: trace LE edema. No cyanosis. Full ROM.  SKIN: color, texture, turgor normal. No rashes or lesions  NEURO: Alert, oriented x 3 this AM, grossly normal, globally weak      All recent labs and imaging has been reviewed.     Recent Results (from the past 24 hour(s))   VANCOMYCIN, TROUGH before 3rd dose    Collection Time: 03/04/20 10:11 PM   Result Value Ref Range    Vancomycin-Trough 12.7 10.0 - 22.0 ug/mL   Basic metabolic panel    Collection Time: 03/05/20  5:01 AM   Result Value Ref Range    Sodium 134 (L) 136 - 145 mmol/L    Potassium 4.2 3.5 - 5.1 mmol/L    Chloride 99 95 - 110 mmol/L    CO2 26 23 - 29 mmol/L    Glucose 168 (H) 70 - 110 mg/dL    BUN, Bld 20 8 - 23 mg/dL    Creatinine 1.3 0.5 - 1.4 mg/dL    Calcium 8.6 (L) 8.7 - 10.5 mg/dL    Anion Gap 9 8 - 16 mmol/L    eGFR if African American 44.1 (A) >60 mL/min/1.73 m^2    eGFR if non  38.3 (A) >60 mL/min/1.73 m^2   CBC auto differential    Collection Time: 03/05/20  5:01 AM   Result Value Ref Range    WBC 8.27 3.90 - 12.70 K/uL    RBC 3.69 (L) 4.00 - 5.40 M/uL    Hemoglobin 9.5 (L) 12.0 - 16.0 g/dL    Hematocrit 31.8 (L) 37.0 - 48.5 %    Mean Corpuscular Volume 86 82 - 98 fL    Mean Corpuscular Hemoglobin 25.7 (L) 27.0 - 31.0 pg    Mean Corpuscular Hemoglobin Conc 29.9 (L) 32.0 - 36.0 g/dL    RDW 16.2 (H) 11.5 - 14.5 %    Platelets 352 (H) 150 - 350 K/uL    MPV 10.2 9.2 - 12.9 fL    Immature Granulocytes 0.5 0.0 - 0.5 %    Gran # (ANC) 6.4 1.8 - 7.7 K/uL    Immature Grans (Abs) 0.04 0.00 - 0.04 K/uL    Lymph # 0.8 (L) 1.0 - 4.8 K/uL    Mono # 1.0 0.3 - 1.0 K/uL    Eos # 0.0 0.0 - 0.5 K/uL    Baso # 0.01 0.00 - 0.20 K/uL    nRBC 0 0 /100 WBC    Gran% 77.1 (H) 38.0 - 73.0 %    Lymph% 9.8 (L) 18.0 - 48.0 %    Mono% 12.5 4.0 - 15.0 %    Eosinophil% 0.0 0.0 - 8.0 %    Basophil% 0.1 0.0 - 1.9 %    Differential Method Automated    Magnesium     Collection Time: 03/05/20  5:01 AM   Result Value Ref Range    Magnesium 1.8 1.6 - 2.6 mg/dL   Phosphorus    Collection Time: 03/05/20  5:01 AM   Result Value Ref Range    Phosphorus 4.0 2.7 - 4.5 mg/dL   Culture, Respiratory with Gram Stain    Collection Time: 03/05/20 10:21 AM   Result Value Ref Range    Gram Stain (Respiratory) <10 epithelial cells per low power field.     Gram Stain (Respiratory) Rare WBC's     Gram Stain (Respiratory) No organisms seen        Recent Labs   Lab 03/01/20  0735 03/01/20  1142 03/01/20  1729 03/01/20  2147 03/02/20  1028 03/03/20  1135   POCTGLUCOSE 93 160* 146* 111* 117* 123*       Hemoglobin A1C   Date Value Ref Range Status   08/27/2019 5.6 4.0 - 5.6 % Final     Comment:     ADA Screening Guidelines:  5.7-6.4%  Consistent with prediabetes  >or=6.5%  Consistent with diabetes  High levels of fetal hemoglobin interfere with the HbA1C  assay. Heterozygous hemoglobin variants (HbS, HgC, etc)do  not significantly interfere with this assay.   However, presence of multiple variants may affect accuracy.     03/13/2019 5.7 (H) 4.0 - 5.6 % Final     Comment:     ADA Screening Guidelines:  5.7-6.4%  Consistent with prediabetes  >or=6.5%  Consistent with diabetes  High levels of fetal hemoglobin interfere with the HbA1C  assay. Heterozygous hemoglobin variants (HbS, HgC, etc)do  not significantly interfere with this assay.   However, presence of multiple variants may affect accuracy.     08/24/2017 5.5 4.0 - 5.6 % Final     Comment:     According to ADA guidelines, hemoglobin A1c <7.0% represents  optimal control in non-pregnant diabetic patients. Different  metrics may apply to specific patient populations.   Standards of Medical Care in Diabetes-2016.  For the purpose of screening for the presence of diabetes:  <5.7%     Consistent with the absence of diabetes  5.7-6.4%  Consistent with increasing risk for diabetes   (prediabetes)  >or=6.5%  Consistent with diabetes  Currently, no  consensus exists for use of hemoglobin A1c  for diagnosis of diabetes for children.  This Hemoglobin A1c assay has significant interference with fetal   hemoglobin   (HbF). The results are invalid for patients with abnormal amounts of   HbF,   including those with known Hereditary Persistence   of Fetal Hemoglobin. Heterozygous hemoglobin variants (HbAS, HbAC,   HbAD, HbAE, HbA2) do not significantly interfere with this assay;   however, presence of multiple variants in a sample may impact the %   interference.          Active Hospital Problems    Diagnosis  POA    *Acute respiratory failure with hypoxia [J96.01]  No    Acute cystitis without hematuria [N30.00]  Yes    Arthritis [M19.90]  Yes    Hyperlipidemia [E78.5]  Yes    Multinodular goiter [E04.2]  Yes    AF (paroxysmal atrial fibrillation) [I48.0]  Yes    Generalized weakness [R53.1]  Yes    Hypertension, essential [I10]  Yes    Iron deficiency anemia secondary to inadequate dietary iron intake [D50.8]  Yes      Resolved Hospital Problems   No resolved problems to display.          ASSESSMENT AND PLAN:     Acute hypoxic respiratory failure  Community acquired pneumonia  Cryptogenic organizing pneumonia on chronic prednisone  Pulmonary HTN secondary to ILD  - On admit - CXR with bibasilar infiltrates however no fever or cough  - started on azithro and rocephin which was transitioned to keflex for UTI as patient didn't have any fever, dyspnea or cough  - 3/2 morning patient was found to be hypoxic Sp02 88% and lethargic.   - Stat ABG and CTA ordered. ABG with respiratory alkalosis  - CTA with worsening consolidations, restarted BSabx for now, and consulted pulmonary for therapy guidance considering her ILD and   - Bronchoscopy by pulm due to hx of , however patient/daughter declined  - Patient was attempted on CPAP overnight for concerns of underlying FREEDOM, and patient tolerated well with resultant good sleep as per daughter/patient. However she  refused this overnight  - 3/4 AM hypoxic around mild low 80s, placed on HFNC, CXR and ABG reviewed, discussed with pulmonary. Increased home steroids, given one dose 80mg IV steroids once as per pulmonary and one dose 40mg IV lasix with good results  - Continue increased prednisone for now  - Duonebs scheduled and supplemental oxygen PRN  - IS provided  - Will need pulmonary fu on discharge     Acute cystitis - resolved  - symptoms concerning for UTI with altered mentation  - UA positive for bacteria and WBC, leuk and nitrates  - given fluids and rocephin  - urine cx with e coli, changed ceftriaxone to keflex - finished tx  - bcx not taken in ED, ordered, NGTD   - afebrile and HDS    Hx of vascular dementia   Acute metabolic encephalopathy  - metabolic secondary to above  - she continued to have intermittent confusion at night, and early AM suspecting from underlying age/dementia  - Patient is on valium, gabapentin, and baclofen, likely contributed to this  - d/c home valium, decrease gabapentin dose, continue prn baclofen  - ABG with no hypercapnia     Generalized weakness  Multiple falls at home  - secondary to above issues  - PT OT rec SNF  - CM SW updated, they have sent referrals    HFrEF  - last TTE in 8/2019 with 40% EF  - appears compensated at this time  - repeat TTE with EF 55% and grade 1 DD, normal CVP    HTN  - hold home amlodipine for now    RA  - hold cellcept for now due to infection  - continue home plaquenil and steroids  - resume cellcept once off abx    HLD  - continue statin    Leukocytosis - resolved  - suspected reactive to above and from steroid use    Long QT interval   - last qtC 311 on 2/27/2020    Hypomagnesemia - resolved  - replete and recheck     Hypokalemia - resolved   - replete and recheck       Prophylaxis- lovenox  Code Status- Full Code   Discharge plan and follow up - d/c to SNF as per PT OT once medically stable    Gerber Shaw MD  Hospital Medicine Staff  Pager [phone]

## 2020-03-05 NOTE — PLAN OF CARE
Problem: Adult Inpatient Plan of Care  Goal: Plan of Care Review  Outcome: Ongoing, Progressing     Pt AAOx4. Some expressive aphasia noted. Pt was agitated for a brief period during the night- pulling off oxygen, not allowing respiratory to place CPAP, etc. Pt called her daughter and the daughter came to stay with the pt. Pt now calm and resting in bed comfortably. Moves all extremities well. No c/o pain, N/V, or numbness/tingling in extremities. VS stable. Purewick in place. Daughter at bedside. Telesitter at bedside. All safety measures maintained through the shift. Will continue to monitor.

## 2020-03-06 LAB
ALBUMIN SERPL BCP-MCNC: 2.4 G/DL (ref 3.5–5.2)
ALP SERPL-CCNC: 65 U/L (ref 55–135)
ALT SERPL W/O P-5'-P-CCNC: 17 U/L (ref 10–44)
ANION GAP SERPL CALC-SCNC: 10 MMOL/L (ref 8–16)
AST SERPL-CCNC: 28 U/L (ref 10–40)
BASOPHILS # BLD AUTO: 0.01 K/UL (ref 0–0.2)
BASOPHILS NFR BLD: 0.1 % (ref 0–1.9)
BILIRUB DIRECT SERPL-MCNC: 0.2 MG/DL (ref 0.1–0.3)
BILIRUB SERPL-MCNC: 0.3 MG/DL (ref 0.1–1)
BUN SERPL-MCNC: 30 MG/DL (ref 8–23)
CALCIUM SERPL-MCNC: 8.8 MG/DL (ref 8.7–10.5)
CHLORIDE SERPL-SCNC: 99 MMOL/L (ref 95–110)
CO2 SERPL-SCNC: 23 MMOL/L (ref 23–29)
CREAT SERPL-MCNC: 1.5 MG/DL (ref 0.5–1.4)
DIFFERENTIAL METHOD: ABNORMAL
EOSINOPHIL # BLD AUTO: 0 K/UL (ref 0–0.5)
EOSINOPHIL NFR BLD: 0 % (ref 0–8)
ERYTHROCYTE [DISTWIDTH] IN BLOOD BY AUTOMATED COUNT: 16.3 % (ref 11.5–14.5)
EST. GFR  (AFRICAN AMERICAN): 37.1 ML/MIN/1.73 M^2
EST. GFR  (NON AFRICAN AMERICAN): 32.2 ML/MIN/1.73 M^2
GLUCOSE SERPL-MCNC: 170 MG/DL (ref 70–110)
HCT VFR BLD AUTO: 31.8 % (ref 37–48.5)
HGB BLD-MCNC: 9.4 G/DL (ref 12–16)
IMM GRANULOCYTES # BLD AUTO: 0.07 K/UL (ref 0–0.04)
IMM GRANULOCYTES NFR BLD AUTO: 0.5 % (ref 0–0.5)
L PNEUMO AG UR QL IA: NOT DETECTED
LYMPHOCYTES # BLD AUTO: 1.5 K/UL (ref 1–4.8)
LYMPHOCYTES NFR BLD: 10.6 % (ref 18–48)
MAGNESIUM SERPL-MCNC: 2.1 MG/DL (ref 1.6–2.6)
MCH RBC QN AUTO: 25.1 PG (ref 27–31)
MCHC RBC AUTO-ENTMCNC: 29.6 G/DL (ref 32–36)
MCV RBC AUTO: 85 FL (ref 82–98)
MONOCYTES # BLD AUTO: 1.7 K/UL (ref 0.3–1)
MONOCYTES NFR BLD: 12.1 % (ref 4–15)
NEUTROPHILS # BLD AUTO: 10.5 K/UL (ref 1.8–7.7)
NEUTROPHILS NFR BLD: 76.7 % (ref 38–73)
NRBC BLD-RTO: 0 /100 WBC
PHOSPHATE SERPL-MCNC: 3.3 MG/DL (ref 2.7–4.5)
PLATELET # BLD AUTO: 401 K/UL (ref 150–350)
PMV BLD AUTO: 10.2 FL (ref 9.2–12.9)
POTASSIUM SERPL-SCNC: 4.2 MMOL/L (ref 3.5–5.1)
PROT SERPL-MCNC: 5.7 G/DL (ref 6–8.4)
RBC # BLD AUTO: 3.74 M/UL (ref 4–5.4)
SODIUM SERPL-SCNC: 132 MMOL/L (ref 136–145)
VANCOMYCIN SERPL-MCNC: 43.7 UG/ML
WBC # BLD AUTO: 13.75 K/UL (ref 3.9–12.7)

## 2020-03-06 PROCEDURE — 97530 THERAPEUTIC ACTIVITIES: CPT

## 2020-03-06 PROCEDURE — 25000003 PHARM REV CODE 250: Performed by: INTERNAL MEDICINE

## 2020-03-06 PROCEDURE — 84100 ASSAY OF PHOSPHORUS: CPT

## 2020-03-06 PROCEDURE — 85025 COMPLETE CBC W/AUTO DIFF WBC: CPT

## 2020-03-06 PROCEDURE — 80202 ASSAY OF VANCOMYCIN: CPT

## 2020-03-06 PROCEDURE — 99233 PR SUBSEQUENT HOSPITAL CARE,LEVL III: ICD-10-PCS | Mod: GC,,, | Performed by: INTERNAL MEDICINE

## 2020-03-06 PROCEDURE — 80048 BASIC METABOLIC PNL TOTAL CA: CPT

## 2020-03-06 PROCEDURE — 80076 HEPATIC FUNCTION PANEL: CPT

## 2020-03-06 PROCEDURE — 83735 ASSAY OF MAGNESIUM: CPT

## 2020-03-06 PROCEDURE — 63600175 PHARM REV CODE 636 W HCPCS: Performed by: INTERNAL MEDICINE

## 2020-03-06 PROCEDURE — 97116 GAIT TRAINING THERAPY: CPT

## 2020-03-06 PROCEDURE — 99231 SBSQ HOSP IP/OBS SF/LOW 25: CPT | Mod: ,,, | Performed by: INTERNAL MEDICINE

## 2020-03-06 PROCEDURE — 11000001 HC ACUTE MED/SURG PRIVATE ROOM

## 2020-03-06 PROCEDURE — 97535 SELF CARE MNGMENT TRAINING: CPT

## 2020-03-06 PROCEDURE — 27000221 HC OXYGEN, UP TO 24 HOURS

## 2020-03-06 PROCEDURE — 25000242 PHARM REV CODE 250 ALT 637 W/ HCPCS: Performed by: INTERNAL MEDICINE

## 2020-03-06 PROCEDURE — 36415 COLL VENOUS BLD VENIPUNCTURE: CPT

## 2020-03-06 PROCEDURE — 99231 PR SUBSEQUENT HOSPITAL CARE,LEVL I: ICD-10-PCS | Mod: ,,, | Performed by: INTERNAL MEDICINE

## 2020-03-06 PROCEDURE — 94640 AIRWAY INHALATION TREATMENT: CPT

## 2020-03-06 PROCEDURE — 99233 SBSQ HOSP IP/OBS HIGH 50: CPT | Mod: GC,,, | Performed by: INTERNAL MEDICINE

## 2020-03-06 PROCEDURE — 99900035 HC TECH TIME PER 15 MIN (STAT)

## 2020-03-06 PROCEDURE — 27000190 HC CPAP FULL FACE MASK W/VALVE

## 2020-03-06 PROCEDURE — 94660 CPAP INITIATION&MGMT: CPT

## 2020-03-06 PROCEDURE — 94799 UNLISTED PULMONARY SVC/PX: CPT

## 2020-03-06 PROCEDURE — 94761 N-INVAS EAR/PLS OXIMETRY MLT: CPT

## 2020-03-06 RX ADMIN — IPRATROPIUM BROMIDE AND ALBUTEROL SULFATE 3 ML: .5; 3 SOLUTION RESPIRATORY (INHALATION) at 09:03

## 2020-03-06 RX ADMIN — PANTOPRAZOLE SODIUM 40 MG: 40 TABLET, DELAYED RELEASE ORAL at 09:03

## 2020-03-06 RX ADMIN — VANCOMYCIN HYDROCHLORIDE 1250 MG: 1.25 INJECTION, POWDER, LYOPHILIZED, FOR SOLUTION INTRAVENOUS at 02:03

## 2020-03-06 RX ADMIN — ASPIRIN 81 MG: 81 TABLET, COATED ORAL at 09:03

## 2020-03-06 RX ADMIN — ENOXAPARIN SODIUM 40 MG: 100 INJECTION SUBCUTANEOUS at 07:03

## 2020-03-06 RX ADMIN — IPRATROPIUM BROMIDE AND ALBUTEROL SULFATE 3 ML: .5; 3 SOLUTION RESPIRATORY (INHALATION) at 08:03

## 2020-03-06 RX ADMIN — SULFAMETHOXAZOLE AND TRIMETHOPRIM 1 TABLET: 800; 160 TABLET ORAL at 09:03

## 2020-03-06 RX ADMIN — IPRATROPIUM BROMIDE AND ALBUTEROL SULFATE 3 ML: .5; 3 SOLUTION RESPIRATORY (INHALATION) at 04:03

## 2020-03-06 RX ADMIN — PREDNISONE 80 MG: 20 TABLET ORAL at 09:03

## 2020-03-06 RX ADMIN — TRAMADOL HYDROCHLORIDE 50 MG: 50 TABLET, FILM COATED ORAL at 09:03

## 2020-03-06 RX ADMIN — IPRATROPIUM BROMIDE AND ALBUTEROL SULFATE 3 ML: .5; 3 SOLUTION RESPIRATORY (INHALATION) at 12:03

## 2020-03-06 RX ADMIN — HYDROXYCHLOROQUINE SULFATE 200 MG: 200 TABLET, FILM COATED ORAL at 09:03

## 2020-03-06 RX ADMIN — ATORVASTATIN CALCIUM 40 MG: 20 TABLET, FILM COATED ORAL at 09:03

## 2020-03-06 RX ADMIN — FUROSEMIDE 40 MG: 40 TABLET ORAL at 09:03

## 2020-03-06 RX ADMIN — AZITHROMYCIN MONOHYDRATE 500 MG: 500 INJECTION, POWDER, LYOPHILIZED, FOR SOLUTION INTRAVENOUS at 01:03

## 2020-03-06 RX ADMIN — IPRATROPIUM BROMIDE AND ALBUTEROL SULFATE 3 ML: .5; 3 SOLUTION RESPIRATORY (INHALATION) at 05:03

## 2020-03-06 RX ADMIN — CEFEPIME 2 G: 2 INJECTION, POWDER, FOR SOLUTION INTRAVENOUS at 06:03

## 2020-03-06 NOTE — PROGRESS NOTES
Pharmacokinetic Assessment Follow Up: IV Vancomycin    · Vancomycin concentration of 43.7 mcg/mL was erroneously drawn and expectedly supratherapeutic given it was drawn 2 hours post dose of vancomycin being initiated.  · Continue current regimen  · Will obtain trough prior to next dose given bump in Scr, however, this bump in Scr cannot be attributed to supratherapeutic vanc.     Pharmacy will continue to follow and monitor vancomycin.    Thank you for the consult,   Nadja Pelayo, PharmD, BCPS  [phone]

## 2020-03-06 NOTE — PROGRESS NOTES
Therapy with vancomycin complete and/or consult discontinued by provider.  Pharmacy will sign off, please re-consult as needed.    Nadja Pelayo, PharmD, BCPS  [phone]

## 2020-03-06 NOTE — PT/OT/SLP PROGRESS
"Physical Therapy Treatment    Patient Name:  Selma Lux   MRN:  [id number]    Recommendations:     Discharge Recommendations:  nursing facility, skilled   Discharge Equipment Recommendations: (TBD as pt progresses)   Barriers to discharge: Inaccessible home and Decreased caregiver support lives with daughter with 5 GLORIA    Assessment:     Selma Lux is a 84 y.o. female admitted with a medical diagnosis of Acute respiratory failure with hypoxia.  She presents with the following impairments/functional limitations:  weakness, impaired functional mobilty, gait instability, pain, impaired cardiopulmonary response to activity . Pt is unsafe with functional mobility at this time due to pt requires minimal assist for transfers and moderate assist for gait due to SOB, LEs crossing over at times causing LOB, and weakness. Pt is motivated to progress with functional mobility.    Rehab Prognosis: Good; patient would benefit from acute skilled PT services to address these deficits and reach maximum level of function.    Recent Surgery: * No surgery found *      Plan:     During this hospitalization, patient to be seen 4 x/week to address the identified rehab impairments via gait training, therapeutic activities, therapeutic exercises, neuromuscular re-education and progress toward the following goals:    · Plan of Care Expires:  03/28/20    Subjective   "I get shortness of breath at times"    Pain/Comfort:  · Pain Rating 1: (pt states she has pain in her L side of her abdomen at times but it comes on suddenly)  · Location - Side 1: Left  · Location 1: abdomen  · Pain Addressed 1: Reposition, Nurse notified  · Pain Rating Post-Intervention 1: 0/10      Objective:     Communicated with nurse prior to session.  Patient found up in chair with telemetry, PureWick, oxygen upon PT entry to room.     General Precautions: Standard, fall   Orthopedic Precautions:N/A   Braces: N/A     Functional Mobility:  · Transfers:   "   · Sit to Stand:  minimum assistance with hand-held assist x 2 trials and rolling walker x 2 trials  · Gait: 30ft x 2 trials with RW with moderate assist +1 to follow with bedside chair. pt performed gait with R foot crossing over the L foot at times and with narrow LYNDSEY causing instability. Limited gait distance due to SOB, oxygen intact at 2 LPM and oxygen sat after gait 93%    AM-PAC 6 CLICK MOBILITY  Turning over in bed (including adjusting bedclothes, sheets and blankets)?: 3  Sitting down on and standing up from a chair with arms (e.g., wheelchair, bedside commode, etc.): 3  Moving from lying on back to sitting on the side of the bed?: 3  Moving to and from a bed to a chair (including a wheelchair)?: 2  Need to walk in hospital room?: 3  Climbing 3-5 steps with a railing?: 2  Basic Mobility Total Score: 16     Therapeutic Activities and Exercises:   pt stood with moderate assist with HHA ~ 1 min x 2 trials to be cleaned and linen replaced due to pt had a BM.    Patient left up in chair with all lines intact, call button in reach, nurse notified and daughter present..    GOALS:   Multidisciplinary Problems     Physical Therapy Goals        Problem: Physical Therapy Goal    Goal Priority Disciplines Outcome Goal Variances Interventions   Physical Therapy Goal     PT, PT/OT Ongoing, Progressing     Description:  Goals to be met by: 3/13/2020     Patient will increase functional independence with mobility by performin. Supine to sit with Contact Guard Assistance  2. Sit to supine with Contact Guard Assistance  3. Sit to stand transfer with Minimal Assistance using Rolling Walker or LRAD-met 3/6/20  Revised goal: sit to stand with Rw with CGA-not met  4. Bed to chair transfer with Minimal Assistance using Rolling Walker or LRAD  5. Gait  x 25 feet with Contact Guard Assistance using Rolling Walker. -partially met 3/6/20  Revised goal: gait 45ft with RW with CGA-not met  6. Sitting at edge of bed x10 minutes  with Stand-by Assistance  7. Lower extremity exercise program x20 reps per handout, with independence                       Time Tracking:     PT Received On: 03/06/20  PT Start Time: 1334     PT Stop Time: 1410  PT Total Time (min): 36 min     Billable Minutes: Gait Training 20 and Therapeutic Activity 16    Treatment Type: Treatment  PT/PTA: PT     PTA Visit Number: 1     Alyssa Enriquez, PT  03/06/2020

## 2020-03-06 NOTE — PT/OT/SLP PROGRESS
Occupational Therapy   Treatment    Name: Selma Lux  MRN: [id number]  Admitting Diagnosis:  Acute respiratory failure with hypoxia       Recommendations:     Discharge Recommendations: nursing facility, skilled  Discharge Equipment Recommendations:  (TBD as pt progresses)  Barriers to discharge:  Decreased caregiver support    Assessment:     Selma Lux is a 84 y.o. female with a medical diagnosis of Acute respiratory failure with hypoxia.  She presents with the following performance deficits affecting function: weakness, impaired endurance, impaired self care skills, impaired balance, impaired functional mobilty, impaired cardiopulmonary response to activity. Pt progressing well towards OT goals. Today, pt required max A for toileting routine post BM in standing. Pt required min A for transfers and mod A for functional mobility with RW. Pt with improved cardiopulmonary response to ax with an 02 of 93% during 1st trial of functional mobility outside of room. Pt responding well to therapeutic intervention noted in her progress thus far. OT POC remains appropriate for patient on this date. Pt will continue to benefit from skilled OT to address the deficits affecting her function.     Rehab Prognosis:  Good; patient would benefit from acute skilled OT services to address these deficits and reach maximum level of function.       Plan:     Patient to be seen 4 x/week to address the above listed problems via self-care/home management, therapeutic activities, therapeutic exercises  · Plan of Care Expires: 03/28/20  · Plan of Care Reviewed with: patient, daughter    Subjective     Pain/Comfort:  · Pain Rating 1: (Pt reported of L side abdominal pain that comes suddenly and varies on intensity; pt did not rate)  · Pain Rating Post-Intervention 1: 0/10    Objective:     Communicated with: RN prior to session.  Patient found up in chair with telemetry, PureWick, oxygen upon OT entry to room.    General  Precautions: Standard, fall   Orthopedic Precautions:N/A   Braces: N/A     Occupational Performance:     Functional Mobility/Transfers:  · Patient completed Sit <> Stand Transfer from chair with minimum assistance of 2 persons  with  rolling walker   · On trial 2, 3, and 4 from the chair, pt required min A and RW  · Functional Mobility: Pt ambulated short household distance (60ft) with mod A and RW with chair to follow. Limited by SOB, oxygen remained donned at 2 LPM and 02 at 93% after functional mobility trial x1     Activities of Daily Living:  · Grooming: maximal assistance performed by daughter due to pt's preferance  · Upper Body Dressing: minimum assistance to don gown as jacket in sitting with cuing for LUE placement into gown  · Toileting: maximal assistance in standing for anterior and posterior pericare; tolerated ~3 minutes total with both trials combined  · LB dressing: Max A to don BLEs into brief; SPV to pull brief past knees; max A to don brief over hips in standing      AMPAC 6 Click ADL: 14    Treatment & Education:  - Role of OT/ OT POC  - Self care safety/ independence  - Functional transfer/ mobility safety  - Pursed lip breathing  - Importance of sitting UIC throughout the day as tolerated   - Energy conservation techniques such as pacing and taking rest breaks as needed  - Progress thus far; functional performance this date     Patient left up in chair with all lines intact, call button in reach, RN notified and daughter presentEducation:      GOALS:   Multidisciplinary Problems     Occupational Therapy Goals        Problem: Occupational Therapy Goal    Goal Priority Disciplines Outcome Interventions   Occupational Therapy Goal     OT, PT/OT Ongoing, Progressing    Description:  Goals to be met by: 3/8/20    Patient will increase functional independence with ADLs by performing:    Feeding with Set-up Assistance.- progressing   UE Dressing with Minimal Assistance.  LE Dressing with Minimal  Assistance.- initiated   Grooming while standing at bedside with Minimal Assistance with AD as needed to prepare for task in standing at sink.  Toileting from bedside commode with Minimal Assistance for hygiene and clothing management.   Stand pivot transfers with Minimal Assistance to reach stable surface.  Toilet transfer to bedside commode with Minimal Assistance.                       Time Tracking:     OT Date of Treatment: 03/06/20  OT Start Time: 1340  OT Stop Time: 1408  OT Total Time (min): 28 min    Billable Minutes:Self Care/Home Management 14  Therapeutic Activity 14    Cora Oliver, LORIE  3/6/2020

## 2020-03-06 NOTE — PLAN OF CARE
Problem: Occupational Therapy Goal  Goal: Occupational Therapy Goal  Description  Goals to be met by: 3/8/20    Patient will increase functional independence with ADLs by performing:    Feeding with Set-up Assistance.- progressing   UE Dressing with Minimal Assistance.  LE Dressing with Minimal Assistance.- initiated   Grooming while standing at bedside with Minimal Assistance with AD as needed to prepare for task in standing at sink.  Toileting from bedside commode with Minimal Assistance for hygiene and clothing management.   Stand pivot transfers with Minimal Assistance to reach stable surface.  Toilet transfer to bedside commode with Minimal Assistance.      Outcome: Ongoing, Progressing     Pt progressing well towards OT goals. OT POC remains appropriate for patient on this date. Pt will continue to benefit from skilled OT to address the deficits affecting her occupational performance.     Cora Oliver OTR/L  3/6/20

## 2020-03-06 NOTE — PROGRESS NOTES
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 Her breathing is slowly improving.    Objective:     Vital Signs (Most Recent):  Temp: 97.5 °F (36.4 °C) (03/06/20 1237)  Pulse: 91 (03/06/20 1250)  Resp: 18 (03/06/20 1250)  BP: (!) 107/53 (03/06/20 1237)  SpO2: 96 % (03/06/20 1250) Vital Signs (24h Range):  Temp:  [97.5 °F (36.4 °C)-98.3 °F (36.8 °C)] 97.5 °F (36.4 °C)  Pulse:  [68-94] 91  Resp:  [17-19] 18  SpO2:  [89 %-98 %] 96 %  BP: (105-124)/(53-59) 107/53     Weight: 84.4 kg (186 lb)  Body mass index is 31.93 kg/m².      Intake/Output Summary (Last 24 hours) at 3/6/2020 1403  Last data filed at 3/6/2020 0600  Gross per 24 hour   Intake 240 ml   Output 1400 ml   Net -1160 ml       Physical Exam   Constitutional: She appears well-developed and well-nourished.   HENT:   Head: Normocephalic and atraumatic.   Eyes: No scleral icterus.   Neck: Neck supple. No JVD present.   Cardiovascular: Normal rate and regular rhythm.   No murmur heard.  Pulmonary/Chest:   Wheezing resolved, bibasilar rhales. 81% on RA when NC removed.   Abdominal: Soft. She exhibits no distension.   Musculoskeletal: She exhibits no edema or deformity.   Neurological: She is alert. Coordination normal.   Answering questions appropriately, strength improved today with more strength to sit up in bed indepdently   Skin: Skin is warm and dry. No rash noted.       O2:  Oxygen Concentration (%): 31 (03/05/20 1202)    Lines/Drains/Airways     Drain            Female External Urinary Catheter 02/29/20 0600 6 days          Peripheral Intravenous Line                  Peripheral IV - Single Lumen 03/05/20 0152 22 G Anterior;Left Forearm 1 day                Significant Labs:    CBC/Anemia Profile:  Recent Labs   Lab 03/05/20  0501 03/06/20  0441   WBC 8.27 13.75*   HGB 9.5* 9.4*   HCT 31.8* 31.8*   * 401*   MCV 86 85   RDW 16.2* 16.3*        Chemistries:  Recent Labs   Lab 03/05/20  0501 03/06/20  0441   * 132*   K 4.2 4.2   CL 99 99   CO2 26 23   BUN 20 30*   CREATININE 1.3 1.5*   CALCIUM 8.6* 8.8   ALBUMIN  --  2.4*   PROT  --  5.7*   BILITOT  --  0.3   ALKPHOS  --  65   ALT  --  17   AST  --  28   MG 1.8 2.1   PHOS 4.0 3.3     resp cx: NGTD    All pertinent labs within the past 24 hours have been reviewed.        Assessment/Plan:     * Acute respiratory failure with hypoxia  Acute hypoxic respiratory failure associated with fevers and peripheral opacities on CT not inconsistent with her previous episodes of . It is likely that her symptoms are related to a decrease in her prednisone dose as she has flared with this in the past. Of concern is that she could have developed CAP, and with immunosuppression, she is at risk for OIs. She has declined bronchoscopy to further evaluate for infectious causes. She has shown more improvement on steroids so far than with antibiotics.  -Con't cefepime to complete a 7-day course for possible pneumonia.  -Recommend stopping vancomycin and azithromcyin.  -Con't steroids to prednisone 1mg/kg; will need high dose steroids for a few weeks before beginning to taper off.  -Will recommend restarting MMF once off antibiotics, which will hopefully facilitate her tolerating lower doses of steroids, although she may not tolerate stopping all together. When on MMF, would decrease to prednisone 60mg daily.  -Needs Bactrim MWF while she remains on >20mg prednisone per day.  -Con't plaquenil.  -Anticipate she will need supplemental O2 for a while, as she has from previous flares.    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     Sadia Bennett, DO  Pulmonology  [facility]

## 2020-03-06 NOTE — PLAN OF CARE
Recommendations    Recommendation:   1. Continue cardaic diet, encourage good PO intake   2. Add boost plus if PO <50% of meals   3.RD to monitor and follow up     Goals: pt to tolerate >85% of EEN/EPN by RD follow up   Nutrition Goal Status: new  Communication of RD Recs: other (comment)(POC)

## 2020-03-06 NOTE — ASSESSMENT & PLAN NOTE
Acute hypoxic respiratory failure associated with fevers and peripheral opacities on CT not inconsistent with her previous episodes of . It is likely that her symptoms are related to a decrease in her prednisone dose as she has flared with this in the past. Of concern is that she could have developed CAP, and with immunosuppression, she is at risk for OIs. She has declined bronchoscopy to further evaluate for infectious causes. She has shown more improvement on steroids so far than with antibiotics.  -Con't cefepime to complete a 7-day course for possible pneumonia.  -Recommend stopping vancomycin and azithromcyin.  -Con't steroids to prednisone 1mg/kg; will need high dose steroids for a few weeks before beginning to taper off.  -Will recommend restarting MMF once off antibiotics, which will hopefully facilitate her tolerating lower doses of steroids, although she may not tolerate stopping all together. When on MMF, would decrease to prednisone 60mg daily.  -Needs Bactrim MWF while she remains on >20mg prednisone per day.  -Con't plaquenil.  -Anticipate she will need supplemental O2 for a while, as she has from previous flares.

## 2020-03-06 NOTE — PROGRESS NOTES
[facility]  Pulmonology  Progress Note    Patient Name: Selma Lux  MRN: [id number]  Admission Date: 2/27/2020  Hospital Length of Stay: 7 days  Code Status: Full Code  Attending Provider: Gerber Shaw MD  Primary Care Provider: Bhargav Hirsch MD   Principal Problem: Acute respiratory failure with hypoxia    Subjective:     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 Her energy is improving some. Her cough has become more productive, and she was able to produce a sputum sample.    Objective:     Vital Signs (Most Recent):  Temp: 97.6 °F (36.4 °C) (03/05/20 1521)  Pulse: 93 (03/05/20 1705)  Resp: 18 (03/05/20 1705)  BP: (!) 124/59 (03/05/20 1521)  SpO2: (!) 89 % (03/05/20 1705) Vital Signs (24h Range):  Temp:  [96.8 °F (36 °C)-98.7 °F (37.1 °C)] 97.6 °F (36.4 °C)  Pulse:  [] 93  Resp:  [15-25] 18  SpO2:  [89 %-100 %] 89 %  BP: (115-128)/(56-66) 124/59     Weight: 84.4 kg (186 lb)  Body mass index is 31.93 kg/m².      Intake/Output Summary (Last 24 hours) at 3/5/2020 1812  Last data filed at 3/5/2020 1200  Gross per 24 hour   Intake --   Output 850 ml   Net -850 ml       Physical Exam   Constitutional: She appears well-developed and well-nourished.   HENT:   Head: Normocephalic and atraumatic.   Eyes: No scleral icterus.   Neck: Neck supple.   Cardiovascular: Normal rate and regular rhythm.   No murmur heard.  Pulmonary/Chest: Effort normal. No respiratory distress.   Bibasilar wheezing, minimal rhales.   Abdominal: Soft. She exhibits no distension.   Musculoskeletal: She exhibits no edema or deformity.   Neurological:   More alert and interactive today, but laying in bed. No gross motor deficits.   Skin: Skin is warm and dry. No rash noted.   Vitals reviewed.      Vents:  Oxygen Concentration (%): 31 (03/05/20 1202)    Lines/Drains/Airways     Drain            Female External Urinary Catheter 02/29/20 0600 5 days           Peripheral Intravenous Line                 Peripheral IV - Single Lumen 03/05/20 0152 22 G Anterior;Left Forearm less than 1 day                Significant Labs:    CBC/Anemia Profile:  Recent Labs   Lab 03/04/20  0419 03/05/20  0501   WBC 14.50* 8.27   HGB 9.7* 9.5*   HCT 33.2* 31.8*    352*   MCV 87 86   RDW 16.2* 16.2*        Chemistries:  Recent Labs   Lab 03/04/20  0419 03/05/20  0501   * 134*   K 3.9 4.2    99   CO2 25 26   BUN 16 20   CREATININE 1.3 1.3   CALCIUM 8.3* 8.6*   MG 1.7 1.8   PHOS 3.4 4.0     vanc trough 12.7    Respiratory Culture:   Recent Labs   Lab 03/05/20  1021   GSRESP <10 epithelial cells per low power field.  Rare WBC's  No organisms seen     All pertinent labs within the past 24 hours have been reviewed.    Significant Imaging:  CXR: I have reviewed all pertinent results/findings within the past 24 hours and my personal findings are:  persistent opacities, worsened interstitial edema    Assessment/Plan:     * Acute respiratory failure with hypoxia  Acute hypoxic respiratory failure associated with fevers and peripheral opacities on CT not inconsistent with her previous episodes of . It is likely that her symptoms are related to a decrease in her prednisone dose as she has flared with this in the past. Of concern is that she could have developed CAP, and with immunosuppression, she is at risk for OIs. She has declined bronchoscopy to further evaluate for infectious causes. She has shown more improvement on steroids so far than with antibiotics.  -Con't broad spectrum antibiotics - likely will deescalate tomorrow  -Con't steroids to prednisone 1mg/kg; will need high dose steroids for a few weeks before beginning to taper off  -Will recommend restarting MMF once off antibiotics, which will hopefully facilitate her tolerating lower doses of steroids, although she may not tolerate stopping all together.  -Needs Bactrim MWF while she remains on >20mg prednisone  per day.  -Con't plaquenil.    Multinodular goiter  Tracheal deviation due to large MNG.  -if her respiratory status were to deteriorate to the point that she required intubation, I would recommend against RSI due to her mediastinal portion of her goiter pushing posteriorly on her trachea      I attempted to contact her daughter Susu to provide an update- brief VM left.     Sadia Bennett, DO  Pulmonology  [facility]

## 2020-03-06 NOTE — SUBJECTIVE & OBJECTIVE
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 Her energy is improving some. Her cough has become more productive, and she was able to produce a sputum sample.    Objective:     Vital Signs (Most Recent):  Temp: 97.6 °F (36.4 °C) (03/05/20 1521)  Pulse: 93 (03/05/20 1705)  Resp: 18 (03/05/20 1705)  BP: (!) 124/59 (03/05/20 1521)  SpO2: (!) 89 % (03/05/20 1705) Vital Signs (24h Range):  Temp:  [96.8 °F (36 °C)-98.7 °F (37.1 °C)] 97.6 °F (36.4 °C)  Pulse:  [] 93  Resp:  [15-25] 18  SpO2:  [89 %-100 %] 89 %  BP: (115-128)/(56-66) 124/59     Weight: 84.4 kg (186 lb)  Body mass index is 31.93 kg/m².      Intake/Output Summary (Last 24 hours) at 3/5/2020 1812  Last data filed at 3/5/2020 1200  Gross per 24 hour   Intake --   Output 850 ml   Net -850 ml       Physical Exam   Constitutional: She appears well-developed and well-nourished.   HENT:   Head: Normocephalic and atraumatic.   Eyes: No scleral icterus.   Neck: Neck supple.   Cardiovascular: Normal rate and regular rhythm.   No murmur heard.  Pulmonary/Chest: Effort normal. No respiratory distress.   Bibasilar wheezing, minimal rhales.   Abdominal: Soft. She exhibits no distension.   Musculoskeletal: She exhibits no edema or deformity.   Neurological:   More alert and interactive today, but laying in bed. No gross motor deficits.   Skin: Skin is warm and dry. No rash noted.   Vitals reviewed.      Vents:  Oxygen Concentration (%): 31 (03/05/20 1202)    Lines/Drains/Airways     Drain            Female External Urinary Catheter 02/29/20 0600 5 days          Peripheral Intravenous Line                 Peripheral IV - Single Lumen 03/05/20 0152 22 G Anterior;Left Forearm less than 1 day                Significant Labs:    CBC/Anemia Profile:  Recent Labs   Lab 03/04/20  0419 03/05/20  0501   WBC 14.50* 8.27   HGB 9.7* 9.5*   HCT 33.2* 31.8*    352*   MCV 87 86   RDW 16.2* 16.2*         Chemistries:  Recent Labs   Lab 03/04/20  0419 03/05/20  0501   * 134*   K 3.9 4.2    99   CO2 25 26   BUN 16 20   CREATININE 1.3 1.3   CALCIUM 8.3* 8.6*   MG 1.7 1.8   PHOS 3.4 4.0     vanc trough 12.7    Respiratory Culture:   Recent Labs   Lab 03/05/20  1021   GSRESP <10 epithelial cells per low power field.  Rare WBC's  No organisms seen     All pertinent labs within the past 24 hours have been reviewed.    Significant Imaging:  CXR: I have reviewed all pertinent results/findings within the past 24 hours and my personal findings are:  persistent opacities, worsened interstitial edema

## 2020-03-06 NOTE — PLAN OF CARE
03/06/20 1510   Discharge Reassessment   Assessment Type Discharge Planning Reassessment   Provided patient/caregiver education on the expected discharge date and the discharge plan Yes   Do you have any problems affording any of your prescribed medications? No   Discharge Plan A Rehab   Discharge Plan B Skilled Nursing Facility   DME Needed Upon Discharge  none   Patient choice form signed by patient/caregiver N/A   Anticipated Discharge Disposition Rehab   Can the patient/caregiver answer the patient profile reliably? Yes, cognitively intact

## 2020-03-06 NOTE — PROGRESS NOTES
[facility] Hospital Medicine                                                                     Progress Note     Team: [facility] MED G Gerber Shaw MD   Admit Date: 2/27/2020   Hospital Day: 8  LETICIA: 3/9/2020  Code status: Full Code   Principal Problem: Acute respiratory failure with hypoxia     SUMMARY:     Selma Lux is a 84 y.o. 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 She states that her PT was commenting on how much weaker she had gotten as well. The patient does complain of not being able to do the things she would like to anymore and that she was probably going to see a psychiatrist soon. Patient is on valium, gabapentin, and baclofen. Per patients daughter she only takes valium and higher dose of gabapentin at night for sleep. Baclofen is only taken once a day now, per her PCP the last time she saw her in the clinic. In ED, CXR with infiltrates and UA concerning for UTI. Medicine called for admission.     Admitted to hospital medicine for acute metabolic encephalopathy suspected secondary to UTI and CAP. Obtained urine and started on BSabx. Unfortunately for blood cultures not obtained, ordered, NGTD. Urine with E Coli. Hypoglycemia noted, suspecting from infection and poor po intake. Since admission patient has gradually improved with supportive care. Changed IV abx to PO. PT OT rec SNF, plan  was discharge to SNF. Unfortunately 3/2 morning patient was found to be hypoxic Sp02 88% and lethargic. Stat ABG and CTA ordered. No piror hx of FREEDOM. Respiratory panel negative. ABG with respiratory alkalosis. CTA with no PE but noting worsening consolidations, will consult pulmonary. Restarted BSabx. Pulmonary offered bronchoscopy due to hx of , however patient/daughter declined at this time. Plan to continue BSabx for now, and now increased home steroids as per pulmonary. Patient was also attempted on CPAP overnight for concerns of underlying FREEDOM, and patient tolerated well with resultant good sleep as per daughter/patient. De-escalated abx to cefepime for total 7 days as per pulmonary. Plan to continue high dose steroids for a few weeks before beginning to taper off. Started on bactrim MWF while she remains of >20mg prednisone per day. PT OT continue to work with patient.     SUBJECTIVE:     Pt was seen and examined at bedside. Stable overnight. Slept well with CPAP. Mentation at baseline. Cough improving with less sputum. Dyspnea improving, weaning off oxygen. No complaints this AM.  Family at bedside updated      ROS (Positive in Bold, otherwise negative)  Pain Scale: 0 /10   Constitutional: fever, chills, night sweats, generalized weakness   CV: chest pain, edema, palpitations  Resp: SOB, cough, sputum production  GI: changes in appetite, NVDC, pain, melena, hematochezia, GERD, hematemesis  : Dysuria, hematuria, urinary urgency, frequency  MSK: arthralgia/myalgia, joint swelling  SKIN: rashes, pruritis, petechiae   Neuro/Psych: FND, anxiety, depression      OBJECTIVE:     Vitals:  Temp:  [97.5 °F (36.4 °C)-98.8 °F (37.1 °C)]   Pulse:  [68-93]   Resp:  [18-19]   BP: (105-117)/(53-58)   SpO2:  [89 %-98 %]      I & O (Last 24H):     Intake/Output Summary (Last 24 hours) at 3/6/2020 1654  Last data filed at 3/6/2020 0600  Gross per 24 hour   Intake --   Output 1100 ml   Net -1100 ml       GEN: AA female  in  no acute distress. Resting in bed. Cooperative. Calm.  HEENT: NCAT. PERRL. EOMI. Conjunctivae/corneas clear, sclera Anicteric.  CVS: RRR. Normal s1 s2 +murmur, no click, rub or gallop  LUNL NC saturating > 90%. Normal respiratory effort. No wheezes. Improving Bibasilar crackles L>R.  ABD: Normoactive BS, soft, NT, distended, no masses or organomegaly.  EXT: no LE edema. No cyanosis. Full ROM.  SKIN: color, texture, turgor normal. No rashes or lesions  NEURO: Alert, oriented x 3 this AM, grossly normal, globally weak      All recent labs and imaging has been reviewed.     Recent Results (from the past 24 hour(s))   Basic metabolic panel    Collection Time: 20  4:41 AM   Result Value Ref Range    Sodium 132 (L) 136 - 145 mmol/L    Potassium 4.2 3.5 - 5.1 mmol/L    Chloride 99 95 - 110 mmol/L    CO2 23 23 - 29 mmol/L    Glucose 170 (H) 70 - 110 mg/dL    BUN, Bld 30 (H) 8 - 23 mg/dL    Creatinine 1.5 (H) 0.5 - 1.4 mg/dL    Calcium 8.8 8.7 - 10.5 mg/dL    Anion Gap 10 8 - 16 mmol/L    eGFR if African American 37.1 (A) >60 mL/min/1.73 m^2    eGFR if non  32.2 (A) >60 mL/min/1.73 m^2   CBC auto differential    Collection Time: 20  4:41 AM   Result Value Ref Range    WBC 13.75 (H) 3.90 - 12.70 K/uL    RBC 3.74 (L) 4.00 - 5.40 M/uL    Hemoglobin 9.4 (L) 12.0 - 16.0 g/dL    Hematocrit 31.8 (L) 37.0 - 48.5 %    Mean Corpuscular Volume 85 82 - 98 fL    Mean Corpuscular Hemoglobin 25.1 (L) 27.0 - 31.0 pg    Mean Corpuscular Hemoglobin Conc 29.6 (L) 32.0 - 36.0 g/dL    RDW 16.3 (H) 11.5 - 14.5 %    Platelets 401 (H) 150 - 350 K/uL    MPV 10.2 9.2 - 12.9 fL    Immature Granulocytes 0.5 0.0 - 0.5 %    Gran # (ANC) 10.5 (H) 1.8 - 7.7 K/uL    Immature Grans (Abs) 0.07 (H) 0.00 - 0.04 K/uL    Lymph # 1.5 1.0 - 4.8 K/uL    Mono # 1.7 (H) 0.3 - 1.0 K/uL    Eos # 0.0 0.0 - 0.5 K/uL    Baso # 0.01 0.00 - 0.20 K/uL    nRBC 0 0 /100 WBC    Gran% 76.7 (H) 38.0 - 73.0 %    Lymph% 10.6 (L) 18.0 - 48.0 %    Mono%  12.1 4.0 - 15.0 %    Eosinophil% 0.0 0.0 - 8.0 %    Basophil% 0.1 0.0 - 1.9 %    Differential Method Automated    Magnesium    Collection Time: 03/06/20  4:41 AM   Result Value Ref Range    Magnesium 2.1 1.6 - 2.6 mg/dL   Phosphorus    Collection Time: 03/06/20  4:41 AM   Result Value Ref Range    Phosphorus 3.3 2.7 - 4.5 mg/dL   Vancomycin, random    Collection Time: 03/06/20  4:41 AM   Result Value Ref Range    Vancomycin, Random 43.7 Not established ug/mL   Hepatic function panel    Collection Time: 03/06/20  4:41 AM   Result Value Ref Range    Total Protein 5.7 (L) 6.0 - 8.4 g/dL    Albumin 2.4 (L) 3.5 - 5.2 g/dL    Total Bilirubin 0.3 0.1 - 1.0 mg/dL    Bilirubin, Direct 0.2 0.1 - 0.3 mg/dL    AST 28 10 - 40 U/L    ALT 17 10 - 44 U/L    Alkaline Phosphatase 65 55 - 135 U/L       Recent Labs   Lab 03/01/20  0735 03/01/20  1142 03/01/20  1729 03/01/20  2147 03/02/20  1028 03/03/20  1135   POCTGLUCOSE 93 160* 146* 111* 117* 123*       Hemoglobin A1C   Date Value Ref Range Status   08/27/2019 5.6 4.0 - 5.6 % Final     Comment:     ADA Screening Guidelines:  5.7-6.4%  Consistent with prediabetes  >or=6.5%  Consistent with diabetes  High levels of fetal hemoglobin interfere with the HbA1C  assay. Heterozygous hemoglobin variants (HbS, HgC, etc)do  not significantly interfere with this assay.   However, presence of multiple variants may affect accuracy.     03/13/2019 5.7 (H) 4.0 - 5.6 % Final     Comment:     ADA Screening Guidelines:  5.7-6.4%  Consistent with prediabetes  >or=6.5%  Consistent with diabetes  High levels of fetal hemoglobin interfere with the HbA1C  assay. Heterozygous hemoglobin variants (HbS, HgC, etc)do  not significantly interfere with this assay.   However, presence of multiple variants may affect accuracy.     08/24/2017 5.5 4.0 - 5.6 % Final     Comment:     According to ADA guidelines, hemoglobin A1c <7.0% represents  optimal control in non-pregnant diabetic patients. Different  metrics may  apply to specific patient populations.   Standards of Medical Care in Diabetes-2016.  For the purpose of screening for the presence of diabetes:  <5.7%     Consistent with the absence of diabetes  5.7-6.4%  Consistent with increasing risk for diabetes   (prediabetes)  >or=6.5%  Consistent with diabetes  Currently, no consensus exists for use of hemoglobin A1c  for diagnosis of diabetes for children.  This Hemoglobin A1c assay has significant interference with fetal   hemoglobin   (HbF). The results are invalid for patients with abnormal amounts of   HbF,   including those with known Hereditary Persistence   of Fetal Hemoglobin. Heterozygous hemoglobin variants (HbAS, HbAC,   HbAD, HbAE, HbA2) do not significantly interfere with this assay;   however, presence of multiple variants in a sample may impact the %   interference.          Active Hospital Problems    Diagnosis  POA    *Acute respiratory failure with hypoxia [J96.01]  No    Acute cystitis without hematuria [N30.00]  Yes    Arthritis [M19.90]  Yes    Hyperlipidemia [E78.5]  Yes    Multinodular goiter [E04.2]  Yes    AF (paroxysmal atrial fibrillation) [I48.0]  Yes    Generalized weakness [R53.1]  Yes    Hypertension, essential [I10]  Yes    Iron deficiency anemia secondary to inadequate dietary iron intake [D50.8]  Yes      Resolved Hospital Problems   No resolved problems to display.          ASSESSMENT AND PLAN:     Acute hypoxic respiratory failure  Community acquired pneumonia  Cryptogenic organizing pneumonia on chronic prednisone  Pulmonary HTN secondary to ILD  - On admit - CXR with bibasilar infiltrates however no fever or cough  - started on azithro and rocephin which was transitioned to keflex for UTI as patient didn't have any fever, dyspnea or cough  - 3/2 morning patient was found to be hypoxic Sp02 88% and lethargic.   - Stat ABG and CTA ordered. ABG with respiratory alkalosis  - CTA with worsening consolidations, restarted BSabx for  now, and consulted pulmonary for therapy guidance considering her ILD and   - Bronchoscopy by pulm due to hx of , however patient/daughter declined  - Patient was attempted on CPAP overnight for concerns of underlying FREEDOM, and patient tolerated well with resultant good sleep as per daughter/patient. However she refused this overnight  - 3/4 AM hypoxic around mild low 80s, placed on HFNC, CXR and ABG reviewed, discussed with pulmonary. Increased home steroids, given one dose 80mg IV steroids once as per pulmonary and one dose 40mg IV lasix with good results  - Continue increased prednisone for now, will need to taper after continuing this for few weeks  - Bactrium as per pulmonary for PCP ppx (>20mg steroids daily)  - De-escalate abx to total 7 days of cefepime   - Restart MMF once off antibiotics   - When on MMF, would decrease to prednisone 60mg daily.  - Duonebs scheduled and supplemental oxygen PRN  - IS provided  - Will need pulmonary fu on discharge     Acute cystitis - resolved  - symptoms concerning for UTI with altered mentation  - UA positive for bacteria and WBC, leuk and nitrates  - given fluids and rocephin  - urine cx with e coli, changed ceftriaxone to keflex - finished tx  - bcx not taken in ED, ordered, NGTD   - afebrile and HDS    Hx of vascular dementia   Acute metabolic encephalopathy  - metabolic secondary to above  - she continued to have intermittent confusion at night, and early AM suspecting from underlying age/dementia  - Patient is on valium, gabapentin, and baclofen, likely contributed to this  - d/c home valium, decrease gabapentin dose, continue prn baclofen  - ABG with no hypercapnia     Generalized weakness  Multiple falls at home  - secondary to above issues  - PT OT rec SNF  - CM SW updated, they have sent referrals    HFrEF  - last TTE in 8/2019 with 40% EF  - appears compensated at this time  - repeat TTE with EF 55% and grade 1 DD, normal CVP    HTN  - hold home amlodipine  for now    RA  - hold cellcept for now due to infection  - continue home plaquenil and steroids  - resume cellcept once off abx    HLD  - continue statin    Leukocytosis  - suspected reactive to above and from steroid use    Long QT interval   - last qtC 311 on 2/27/2020    Hypomagnesemia - resolved  - replete and recheck     Hypokalemia - resolved   - replete and recheck       Prophylaxis- lovenox  Code Status- Full Code   Discharge plan and follow up - d/c to SNF as per PT OT once medically stable    Gerber Shaw MD  Hospital Medicine Staff  Pager [phone]

## 2020-03-06 NOTE — PROGRESS NOTES
"[facility]  Adult Nutrition  Progress Note    SUMMARY       Recommendations    Recommendation:   1. Continue cardaic diet, encourage good PO intake   2. Add boost plus if PO <50% of meals   3.RD to monitor and follow up     Goals: pt to tolerate >85% of EEN/EPN by RD follow up   Nutrition Goal Status: new  Communication of RD Recs: other (comment)(POC)    Reason for Assessment    Reason For Assessment: length of stay  Diagnosis: (acute respiratory failure)  Relevant Medical History: COPD; HTN; TIA: dementia  Interdisciplinary Rounds: did not attend  General Information Comments: Pt unavailable on mulitple attempts. Per chart pt with good PO intake, ~75% of meals. Unsure of intake PTA. Chart shows pt with wt gain recently from ~175 lbs. Pt seen by RD on 9/2019 with good PO intake and no indications of malnutrition at this time. No c/o N/V/C/D reported. KERRY NFPE at this time, will complete if needed on follow up.   Nutrition Discharge Planning: adequate PO intake     Nutrition Risk Screen    Nutrition Risk Screen: no indicators present    Nutrition/Diet History    Spiritual, Cultural Beliefs, Spiritism Practices, Values that Affect Care: no  Food Allergies: NKFA  Factors Affecting Nutritional Intake: None identified at this time    Anthropometrics    Temp: 97.5 °F (36.4 °C)  Height Method: Stated  Height: 5' 4" (162.6 cm)  Height (inches): 64 in  Weight Method: Bed Scale  Weight: 84.4 kg (186 lb)  Weight (lb): 186 lb  Ideal Body Weight (IBW), Female: 120 lb  % Ideal Body Weight, Female (lb): 155 %  BMI (Calculated): 31.9  BMI Grade: 30 - 34.9- obesity - grade I    Lab/Procedures/Meds    Pertinent Labs Reviewed: reviewed  Pertinent Labs Comments: Na 132; BUN 30; Cr 1.5; Glucose 170  Pertinent Medications Reviewed: reviewed  Pertinent Medications Comments: prednisone; ergocalciferol; statin; cefepime     Estimated/Assessed Needs    Weight Used For Calorie Calculations: 84.4 kg (186 lb 1.1 " oz)  Energy Calorie Requirements (kcal): 1675 kcal/d  Energy Need Method: [place]-St Jeor(x1.3)  Protein Requirements:  g/day   Weight Used For Protein Calculations: 84.4 kg (186 lb 1.1 oz)  Fluid Requirements (mL): 1 mL/kcal or per MD  RDA Method (mL): 1675    Nutrition Prescription Ordered    Current Diet Order: Cardiac diet     Evaluation of Received Nutrient/Fluid Intake    Energy Calories Required: meeting needs  Protein Required: meeting needs  Fluid Required: meeting needs  Comments: LBM 3/6  Tolerance: tolerating  % Intake of Estimated Energy Needs: 75 - 100 %  % Meal Intake: 75 - 100 %    Nutrition Risk    Level of Risk/Frequency of Follow-up: low     Assessment and Plan    Nutrition Problem  increased nutrient needs    Related to (etiology):   Weaknes/fatigue     Signs and Symptoms (as evidenced by):   Pt with COPD     Interventions (treatment strategy):  Collaboration of care with other providers    Nutrition Diagnosis Status:   New    Monitor and Evaluation    Food and Nutrient Intake: energy intake, food and beverage intake  Food and Nutrient Adminstration: diet order  Knowledge/Beliefs/Attitudes: food and nutrition knowledge/skill  Anthropometric Measurements: weight, weight change  Biochemical Data, Medical Tests and Procedures: electrolyte and renal panel, lipid profile, gastrointestinal profile, glucose/endocrine profile, inflammatory profile  Nutrition-Focused Physical Findings: overall appearance     Nutrition Follow-Up    RD Follow-up?: Yes

## 2020-03-06 NOTE — PLAN OF CARE
Problem: Physical Therapy Goal  Goal: Physical Therapy Goal  Description  Goals to be met by: 3/13/2020     Patient will increase functional independence with mobility by performin. Supine to sit with Contact Guard Assistance  2. Sit to supine with Contact Guard Assistance  3. Sit to stand transfer with Minimal Assistance using Rolling Walker or LRAD-met 3/6/20  Revised goal: sit to stand with Rw with CGA-not met  4. Bed to chair transfer with Minimal Assistance using Rolling Walker or LRAD  5. Gait  x 25 feet with Contact Guard Assistance using Rolling Walker. -partially met 3/6/20  Revised goal: gait 60ft with RW with CGA-not met  6. Sitting at edge of bed x10 minutes with Stand-by Assistance  7. Lower extremity exercise program x20 reps per handout, with independence      Outcome: Ongoing, Progressing   Pt's goals revised as needed and pt will continue to benefit from skilled PT services to work towards improved functional mobility including: bed mobility, transfers, and gait.   Alyssa Enriquez, PT  3/6/2020

## 2020-03-06 NOTE — SUBJECTIVE & OBJECTIVE
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 Her breathing is slowly improving.    Objective:     Vital Signs (Most Recent):  Temp: 97.5 °F (36.4 °C) (03/06/20 1237)  Pulse: 91 (03/06/20 1250)  Resp: 18 (03/06/20 1250)  BP: (!) 107/53 (03/06/20 1237)  SpO2: 96 % (03/06/20 1250) Vital Signs (24h Range):  Temp:  [97.5 °F (36.4 °C)-98.3 °F (36.8 °C)] 97.5 °F (36.4 °C)  Pulse:  [68-94] 91  Resp:  [17-19] 18  SpO2:  [89 %-98 %] 96 %  BP: (105-124)/(53-59) 107/53     Weight: 84.4 kg (186 lb)  Body mass index is 31.93 kg/m².      Intake/Output Summary (Last 24 hours) at 3/6/2020 1403  Last data filed at 3/6/2020 0600  Gross per 24 hour   Intake 240 ml   Output 1400 ml   Net -1160 ml       Physical Exam   Constitutional: She appears well-developed and well-nourished.   HENT:   Head: Normocephalic and atraumatic.   Eyes: No scleral icterus.   Neck: Neck supple. No JVD present.   Cardiovascular: Normal rate and regular rhythm.   No murmur heard.  Pulmonary/Chest:   Wheezing resolved, bibasilar rhales. 81% on RA when NC removed.   Abdominal: Soft. She exhibits no distension.   Musculoskeletal: She exhibits no edema or deformity.   Neurological: She is alert. Coordination normal.   Answering questions appropriately, strength improved today with more strength to sit up in bed indepdently   Skin: Skin is warm and dry. No rash noted.       O2:  Oxygen Concentration (%): 31 (03/05/20 1202)    Lines/Drains/Airways     Drain            Female External Urinary Catheter 02/29/20 0600 6 days          Peripheral Intravenous Line                 Peripheral IV - Single Lumen 03/05/20 0152 22 G Anterior;Left Forearm 1 day                Significant Labs:    CBC/Anemia Profile:  Recent Labs   Lab 03/05/20  0501 03/06/20  0441   WBC 8.27 13.75*   HGB 9.5* 9.4*   HCT 31.8* 31.8*   * 401*   MCV 86 85   RDW 16.2* 16.3*        Chemistries:  Recent Labs   Lab 03/05/20  0501 03/06/20  0441   NA  134* 132*   K 4.2 4.2   CL 99 99   CO2 26 23   BUN 20 30*   CREATININE 1.3 1.5*   CALCIUM 8.6* 8.8   ALBUMIN  --  2.4*   PROT  --  5.7*   BILITOT  --  0.3   ALKPHOS  --  65   ALT  --  17   AST  --  28   MG 1.8 2.1   PHOS 4.0 3.3     resp cx: NGTD    All pertinent labs within the past 24 hours have been reviewed.

## 2020-03-06 NOTE — ASSESSMENT & PLAN NOTE
Acute hypoxic respiratory failure associated with fevers and peripheral opacities on CT not inconsistent with her previous episodes of . It is likely that her symptoms are related to a decrease in her prednisone dose as she has flared with this in the past. Of concern is that she could have developed CAP, and with immunosuppression, she is at risk for OIs. She has declined bronchoscopy to further evaluate for infectious causes. She has shown more improvement on steroids so far than with antibiotics.  -Con't broad spectrum antibiotics - likely will deescalate tomorrow  -Con't steroids to prednisone 1mg/kg; will need high dose steroids for a few weeks before beginning to taper off  -Will recommend restarting MMF once off antibiotics, which will hopefully facilitate her tolerating lower doses of steroids, although she may not tolerate stopping all together.  -Needs Bactrim MWF while she remains on >20mg prednisone per day.  -Con't plaquenil.

## 2020-03-07 LAB
ANION GAP SERPL CALC-SCNC: 9 MMOL/L (ref 8–16)
BACTERIA SPEC AEROBE CULT: NORMAL
BACTERIA SPEC AEROBE CULT: NORMAL
BASOPHILS # BLD AUTO: 0.01 K/UL (ref 0–0.2)
BASOPHILS NFR BLD: 0.1 % (ref 0–1.9)
BUN SERPL-MCNC: 32 MG/DL (ref 8–23)
CALCIUM SERPL-MCNC: 8.6 MG/DL (ref 8.7–10.5)
CHLORIDE SERPL-SCNC: 102 MMOL/L (ref 95–110)
CO2 SERPL-SCNC: 25 MMOL/L (ref 23–29)
CREAT SERPL-MCNC: 1.4 MG/DL (ref 0.5–1.4)
DIFFERENTIAL METHOD: ABNORMAL
EOSINOPHIL # BLD AUTO: 0 K/UL (ref 0–0.5)
EOSINOPHIL NFR BLD: 0 % (ref 0–8)
ERYTHROCYTE [DISTWIDTH] IN BLOOD BY AUTOMATED COUNT: 16.3 % (ref 11.5–14.5)
EST. GFR  (AFRICAN AMERICAN): 40.4 ML/MIN/1.73 M^2
EST. GFR  (NON AFRICAN AMERICAN): 35 ML/MIN/1.73 M^2
GLUCOSE SERPL-MCNC: 127 MG/DL (ref 70–110)
GRAM STN SPEC: NORMAL
HCT VFR BLD AUTO: 33.6 % (ref 37–48.5)
HGB BLD-MCNC: 9.9 G/DL (ref 12–16)
IMM GRANULOCYTES # BLD AUTO: 0.1 K/UL (ref 0–0.04)
IMM GRANULOCYTES NFR BLD AUTO: 0.8 % (ref 0–0.5)
LYMPHOCYTES # BLD AUTO: 1.4 K/UL (ref 1–4.8)
LYMPHOCYTES NFR BLD: 11.2 % (ref 18–48)
MAGNESIUM SERPL-MCNC: 2.2 MG/DL (ref 1.6–2.6)
MCH RBC QN AUTO: 24.9 PG (ref 27–31)
MCHC RBC AUTO-ENTMCNC: 29.5 G/DL (ref 32–36)
MCV RBC AUTO: 85 FL (ref 82–98)
MONOCYTES # BLD AUTO: 1.6 K/UL (ref 0.3–1)
MONOCYTES NFR BLD: 12.6 % (ref 4–15)
NEUTROPHILS # BLD AUTO: 9.3 K/UL (ref 1.8–7.7)
NEUTROPHILS NFR BLD: 75.3 % (ref 38–73)
NRBC BLD-RTO: 0 /100 WBC
PHOSPHATE SERPL-MCNC: 2.7 MG/DL (ref 2.7–4.5)
PLATELET # BLD AUTO: 445 K/UL (ref 150–350)
PMV BLD AUTO: 10.1 FL (ref 9.2–12.9)
POTASSIUM SERPL-SCNC: 4.4 MMOL/L (ref 3.5–5.1)
RBC # BLD AUTO: 3.97 M/UL (ref 4–5.4)
SODIUM SERPL-SCNC: 136 MMOL/L (ref 136–145)
WBC # BLD AUTO: 12.32 K/UL (ref 3.9–12.7)

## 2020-03-07 PROCEDURE — 63600175 PHARM REV CODE 636 W HCPCS: Performed by: INTERNAL MEDICINE

## 2020-03-07 PROCEDURE — 85025 COMPLETE CBC W/AUTO DIFF WBC: CPT

## 2020-03-07 PROCEDURE — 25000003 PHARM REV CODE 250: Performed by: INTERNAL MEDICINE

## 2020-03-07 PROCEDURE — 99900035 HC TECH TIME PER 15 MIN (STAT)

## 2020-03-07 PROCEDURE — 27000221 HC OXYGEN, UP TO 24 HOURS

## 2020-03-07 PROCEDURE — 36415 COLL VENOUS BLD VENIPUNCTURE: CPT

## 2020-03-07 PROCEDURE — 11000001 HC ACUTE MED/SURG PRIVATE ROOM

## 2020-03-07 PROCEDURE — 25000242 PHARM REV CODE 250 ALT 637 W/ HCPCS: Performed by: INTERNAL MEDICINE

## 2020-03-07 PROCEDURE — 83735 ASSAY OF MAGNESIUM: CPT

## 2020-03-07 PROCEDURE — 99231 SBSQ HOSP IP/OBS SF/LOW 25: CPT | Mod: ,,, | Performed by: INTERNAL MEDICINE

## 2020-03-07 PROCEDURE — 94761 N-INVAS EAR/PLS OXIMETRY MLT: CPT

## 2020-03-07 PROCEDURE — 94660 CPAP INITIATION&MGMT: CPT

## 2020-03-07 PROCEDURE — 80048 BASIC METABOLIC PNL TOTAL CA: CPT

## 2020-03-07 PROCEDURE — 84100 ASSAY OF PHOSPHORUS: CPT

## 2020-03-07 PROCEDURE — 99231 PR SUBSEQUENT HOSPITAL CARE,LEVL I: ICD-10-PCS | Mod: ,,, | Performed by: INTERNAL MEDICINE

## 2020-03-07 PROCEDURE — 94640 AIRWAY INHALATION TREATMENT: CPT

## 2020-03-07 RX ORDER — CEFEPIME HYDROCHLORIDE 2 G/1
2 INJECTION, POWDER, FOR SOLUTION INTRAVENOUS
Status: DISCONTINUED | OUTPATIENT
Start: 2020-03-07 | End: 2020-03-07

## 2020-03-07 RX ORDER — PREDNISONE 20 MG/1
60 TABLET ORAL DAILY
Status: DISCONTINUED | OUTPATIENT
Start: 2020-03-08 | End: 2020-03-10 | Stop reason: HOSPADM

## 2020-03-07 RX ORDER — CEFEPIME HYDROCHLORIDE 2 G/1
2 INJECTION, POWDER, FOR SOLUTION INTRAVENOUS
Status: DISCONTINUED | OUTPATIENT
Start: 2020-03-07 | End: 2020-03-10

## 2020-03-07 RX ADMIN — CEFEPIME 2 G: 2 INJECTION, POWDER, FOR SOLUTION INTRAVENOUS at 02:03

## 2020-03-07 RX ADMIN — ASPIRIN 81 MG: 81 TABLET, COATED ORAL at 08:03

## 2020-03-07 RX ADMIN — ENOXAPARIN SODIUM 40 MG: 100 INJECTION SUBCUTANEOUS at 06:03

## 2020-03-07 RX ADMIN — CEFEPIME 2 G: 2 INJECTION, POWDER, FOR SOLUTION INTRAVENOUS at 06:03

## 2020-03-07 RX ADMIN — ATORVASTATIN CALCIUM 40 MG: 20 TABLET, FILM COATED ORAL at 08:03

## 2020-03-07 RX ADMIN — HYDROXYCHLOROQUINE SULFATE 200 MG: 200 TABLET, FILM COATED ORAL at 08:03

## 2020-03-07 RX ADMIN — IPRATROPIUM BROMIDE AND ALBUTEROL SULFATE 3 ML: .5; 3 SOLUTION RESPIRATORY (INHALATION) at 04:03

## 2020-03-07 RX ADMIN — PANTOPRAZOLE SODIUM 40 MG: 40 TABLET, DELAYED RELEASE ORAL at 08:03

## 2020-03-07 RX ADMIN — IPRATROPIUM BROMIDE AND ALBUTEROL SULFATE 3 ML: .5; 3 SOLUTION RESPIRATORY (INHALATION) at 03:03

## 2020-03-07 RX ADMIN — FUROSEMIDE 40 MG: 40 TABLET ORAL at 08:03

## 2020-03-07 RX ADMIN — Medication 6 MG: at 09:03

## 2020-03-07 RX ADMIN — IPRATROPIUM BROMIDE AND ALBUTEROL SULFATE 3 ML: .5; 3 SOLUTION RESPIRATORY (INHALATION) at 12:03

## 2020-03-07 RX ADMIN — IPRATROPIUM BROMIDE AND ALBUTEROL SULFATE 3 ML: .5; 3 SOLUTION RESPIRATORY (INHALATION) at 08:03

## 2020-03-07 RX ADMIN — CEFEPIME 2 G: 2 INJECTION, POWDER, FOR SOLUTION INTRAVENOUS at 09:03

## 2020-03-07 RX ADMIN — PREDNISONE 80 MG: 20 TABLET ORAL at 08:03

## 2020-03-07 RX ADMIN — ACETAMINOPHEN 650 MG: 325 TABLET ORAL at 12:03

## 2020-03-07 RX ADMIN — IPRATROPIUM BROMIDE AND ALBUTEROL SULFATE 3 ML: .5; 3 SOLUTION RESPIRATORY (INHALATION) at 07:03

## 2020-03-07 RX ADMIN — Medication 6 MG: at 12:03

## 2020-03-07 RX ADMIN — IPRATROPIUM BROMIDE AND ALBUTEROL SULFATE 3 ML: .5; 3 SOLUTION RESPIRATORY (INHALATION) at 11:03

## 2020-03-07 NOTE — PROGRESS NOTES
[facility] Hospital Medicine                                                                     Progress Note     Team: [facility] G Gerber Shaw MD   Admit Date: 2/27/2020   Hospital Day: 9  LETICIA: 3/9/2020  Code status: Full Code   Principal Problem: Acute respiratory failure with hypoxia     SUMMARY:     Selma Lux is a 84 y.o. 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 She states that her PT was commenting on how much weaker she had gotten as well. The patient does complain of not being able to do the things she would like to anymore and that she was probably going to see a psychiatrist soon. Patient is on valium, gabapentin, and baclofen. Per patients daughter she only takes valium and higher dose of gabapentin at night for sleep. Baclofen is only taken once a day now, per her PCP the last time she saw her in the clinic. In ED, CXR with infiltrates and UA concerning for UTI. Medicine called for admission.     Admitted to hospital medicine for acute metabolic encephalopathy suspected secondary to UTI and CAP. Obtained urine and started on BSabx. Unfortunately for blood cultures not obtained, ordered, NGTD. Urine with E Coli. Hypoglycemia noted, suspecting from infection and poor po intake. Since admission patient has gradually improved with supportive care. Changed IV abx to PO. PT OT rec SNF, plan  was discharge to SNF. Unfortunately 3/2 morning patient was found to be hypoxic Sp02 88% and lethargic. Stat ABG and CTA ordered. No piror hx of FREEDOM. Respiratory panel negative. ABG with respiratory alkalosis. CTA with no PE but noting worsening consolidations, will consult pulmonary. Restarted BSabx. Pulmonary offered bronchoscopy due to hx of , however patient/daughter declined at this time. Plan to continue BSabx for now, and now increased home steroids as per pulmonary. Patient was also attempted on CPAP overnight for concerns of underlying FREEDOM, and patient tolerated well with resultant good sleep as per daughter/patient. De-escalated abx to cefepime for total 7 days as per pulmonary. Plan to continue high dose steroids for a few weeks before beginning to taper off. Started on bactrim MWF while she remains of >20mg prednisone per day. PT OT continue to work with patient.     SUBJECTIVE:     Pt was seen and examined at bedside. Stable overnight. Didn't sleep too well with CPAP. No new complaints. Mentation at baseline. Cough improving with less sputum. Dyspnea improving, weaning off oxygen. Plan to sleep wo cpap tn, will reassess tomorrow. Family at bedside updated.       ROS (Positive in Bold, otherwise negative)  Pain Scale: 0 /10   Constitutional: fever, chills, night sweats, generalized weakness   CV: chest pain, edema, palpitations  Resp: SOB, cough, sputum production  GI: changes in appetite, NVDC, pain, melena, hematochezia, GERD, hematemesis  : Dysuria, hematuria, urinary urgency, frequency  MSK: arthralgia/myalgia, joint swelling  SKIN: rashes, pruritis, petechiae   Neuro/Psych: FND, anxiety, depression      OBJECTIVE:     Vitals:  Temp:  [97.1 °F (36.2 °C)-98.8 °F (37.1 °C)]   Pulse:  [74-92]   Resp:  [15-23]   BP: (115-138)/(53-63)   SpO2:  [92 %-100 %]      I & O (Last 24H):     Intake/Output Summary (Last 24 hours) at 3/7/2020 1432  Last data filed at 3/7/2020 0800  Gross per 24 hour   Intake  240 ml   Output 900 ml   Net -660 ml       GEN: AA female  in no acute distress. Resting in bed. Cooperative. Calm.  HEENT: NCAT. PERRL. EOMI. Conjunctivae/corneas clear, sclera Anicteric.  CVS: RRR. Normal s1 s2 +murmur, no click, rub or gallop  LUNL NC saturating > 90%. Normal respiratory effort. No wheezes. Improving Bibasilar crackles L>R.  ABD: Normoactive BS, soft, NT, distended, no masses or organomegaly.  EXT: no LE edema. No cyanosis. Full ROM.  SKIN: color, texture, turgor normal. No rashes or lesions  NEURO: Alert, oriented x 3 this AM, grossly normal, globally weak      All recent labs and imaging has been reviewed.     Recent Results (from the past 24 hour(s))   Basic metabolic panel    Collection Time: 20  3:56 AM   Result Value Ref Range    Sodium 136 136 - 145 mmol/L    Potassium 4.4 3.5 - 5.1 mmol/L    Chloride 102 95 - 110 mmol/L    CO2 25 23 - 29 mmol/L    Glucose 127 (H) 70 - 110 mg/dL    BUN, Bld 32 (H) 8 - 23 mg/dL    Creatinine 1.4 0.5 - 1.4 mg/dL    Calcium 8.6 (L) 8.7 - 10.5 mg/dL    Anion Gap 9 8 - 16 mmol/L    eGFR if African American 40.4 (A) >60 mL/min/1.73 m^2    eGFR if non African American 35.0 (A) >60 mL/min/1.73 m^2   CBC auto differential    Collection Time: 20  3:56 AM   Result Value Ref Range    WBC 12.32 3.90 - 12.70 K/uL    RBC 3.97 (L) 4.00 - 5.40 M/uL    Hemoglobin 9.9 (L) 12.0 - 16.0 g/dL    Hematocrit 33.6 (L) 37.0 - 48.5 %    Mean Corpuscular Volume 85 82 - 98 fL    Mean Corpuscular Hemoglobin 24.9 (L) 27.0 - 31.0 pg    Mean Corpuscular Hemoglobin Conc 29.5 (L) 32.0 - 36.0 g/dL    RDW 16.3 (H) 11.5 - 14.5 %    Platelets 445 (H) 150 - 350 K/uL    MPV 10.1 9.2 - 12.9 fL    Immature Granulocytes 0.8 (H) 0.0 - 0.5 %    Gran # (ANC) 9.3 (H) 1.8 - 7.7 K/uL    Immature Grans (Abs) 0.10 (H) 0.00 - 0.04 K/uL    Lymph # 1.4 1.0 - 4.8 K/uL    Mono # 1.6 (H) 0.3 - 1.0 K/uL    Eos # 0.0 0.0 - 0.5 K/uL    Baso # 0.01 0.00 - 0.20 K/uL    nRBC 0 0 /100 WBC    Gran% 75.3  (H) 38.0 - 73.0 %    Lymph% 11.2 (L) 18.0 - 48.0 %    Mono% 12.6 4.0 - 15.0 %    Eosinophil% 0.0 0.0 - 8.0 %    Basophil% 0.1 0.0 - 1.9 %    Differential Method Automated    Magnesium    Collection Time: 03/07/20  3:56 AM   Result Value Ref Range    Magnesium 2.2 1.6 - 2.6 mg/dL   Phosphorus    Collection Time: 03/07/20  3:56 AM   Result Value Ref Range    Phosphorus 2.7 2.7 - 4.5 mg/dL       Recent Labs   Lab 03/01/20  0735 03/01/20  1142 03/01/20  1729 03/01/20  2147 03/02/20  1028 03/03/20  1135   POCTGLUCOSE 93 160* 146* 111* 117* 123*       Hemoglobin A1C   Date Value Ref Range Status   08/27/2019 5.6 4.0 - 5.6 % Final     Comment:     ADA Screening Guidelines:  5.7-6.4%  Consistent with prediabetes  >or=6.5%  Consistent with diabetes  High levels of fetal hemoglobin interfere with the HbA1C  assay. Heterozygous hemoglobin variants (HbS, HgC, etc)do  not significantly interfere with this assay.   However, presence of multiple variants may affect accuracy.     03/13/2019 5.7 (H) 4.0 - 5.6 % Final     Comment:     ADA Screening Guidelines:  5.7-6.4%  Consistent with prediabetes  >or=6.5%  Consistent with diabetes  High levels of fetal hemoglobin interfere with the HbA1C  assay. Heterozygous hemoglobin variants (HbS, HgC, etc)do  not significantly interfere with this assay.   However, presence of multiple variants may affect accuracy.     08/24/2017 5.5 4.0 - 5.6 % Final     Comment:     According to ADA guidelines, hemoglobin A1c <7.0% represents  optimal control in non-pregnant diabetic patients. Different  metrics may apply to specific patient populations.   Standards of Medical Care in Diabetes-2016.  For the purpose of screening for the presence of diabetes:  <5.7%     Consistent with the absence of diabetes  5.7-6.4%  Consistent with increasing risk for diabetes   (prediabetes)  >or=6.5%  Consistent with diabetes  Currently, no consensus exists for use of hemoglobin A1c  for diagnosis of diabetes for  children.  This Hemoglobin A1c assay has significant interference with fetal   hemoglobin   (HbF). The results are invalid for patients with abnormal amounts of   HbF,   including those with known Hereditary Persistence   of Fetal Hemoglobin. Heterozygous hemoglobin variants (HbAS, HbAC,   HbAD, HbAE, HbA2) do not significantly interfere with this assay;   however, presence of multiple variants in a sample may impact the %   interference.          Active Hospital Problems    Diagnosis  POA    *Acute respiratory failure with hypoxia [J96.01]  No    Acute cystitis without hematuria [N30.00]  Yes    Arthritis [M19.90]  Yes    Hyperlipidemia [E78.5]  Yes    Multinodular goiter [E04.2]  Yes    AF (paroxysmal atrial fibrillation) [I48.0]  Yes    Generalized weakness [R53.1]  Yes    Hypertension, essential [I10]  Yes    Iron deficiency anemia secondary to inadequate dietary iron intake [D50.8]  Yes      Resolved Hospital Problems   No resolved problems to display.          ASSESSMENT AND PLAN:     Acute hypoxic respiratory failure  Community acquired pneumonia  Cryptogenic organizing pneumonia on chronic prednisone  Pulmonary HTN secondary to ILD  - On admit - CXR with bibasilar infiltrates however no fever or cough  - started on azithro and rocephin which was transitioned to keflex for UTI as patient didn't have any fever, dyspnea or cough  - 3/2 morning patient was found to be hypoxic Sp02 88% and lethargic.   - Stat ABG and CTA ordered. ABG with respiratory alkalosis  - CTA with worsening consolidations, restarted BSabx for now, and consulted pulmonary for therapy guidance considering her ILD and   - Bronchoscopy by pulm due to hx of , however patient/daughter declined  - Patient was attempted on CPAP overnight for concerns of underlying FREEDOM, and patient tolerated well with resultant good sleep as per daughter/patient. However she refused this overnight  - 3/4 AM hypoxic around mild low 80s, placed on  HFNC, CXR and ABG reviewed, discussed with pulmonary. Increased home steroids, given one dose 80mg IV steroids once as per pulmonary and one dose 40mg IV lasix with good results  - Continue increased prednisone for now, will need to taper after continuing this for few weeks  - Bactrium as per pulmonary for PCP ppx (>20mg steroids daily)  - De-escalate abx to total 7 days of cefepime   - Restart MMF once off antibiotics   - When on MMF, will decrease to prednisone 60mg daily.  - Duonebs scheduled and supplemental oxygen PRN  - IS provided  - Will need pulmonary fu on discharge     Acute cystitis - resolved  - symptoms concerning for UTI with altered mentation  - UA positive for bacteria and WBC, leuk and nitrates  - given fluids and rocephin  - urine cx with e coli, changed ceftriaxone to keflex - finished tx  - bcx not taken in ED, ordered, NGTD   - afebrile and HDS    Hx of vascular dementia   Acute metabolic encephalopathy  - metabolic secondary to above  - she continued to have intermittent confusion at night, and early AM suspecting from underlying age/dementia  - Patient is on valium, gabapentin, and baclofen, likely contributed to this  - d/c home valium, decrease gabapentin dose, continue prn baclofen  - ABG with no hypercapnia     Generalized weakness  Multiple falls at home  - secondary to above issues  - PT OT rec SNF  - CM SW updated, they have sent referrals    HFrEF  - last TTE in 8/2019 with 40% EF  - appears compensated at this time  - repeat TTE with EF 55% and grade 1 DD, normal CVP    HTN  - hold home amlodipine for now    RA  - hold cellcept for now due to infection  - continue home plaquenil and steroids  - resume cellcept once off abx    HLD  - continue statin    Leukocytosis  - suspected reactive to above and from steroid use    Long QT interval   - last qtC 311 on 2/27/2020    Hypomagnesemia - resolved  - replete and recheck     Hypokalemia - resolved   - replete and recheck       Prophylaxis-  lovenox  Code Status- Full Code   Discharge plan and follow up - d/c to SNF as per PT OT once medically stable    Greber Shaw MD  Hospital Medicine Staff  Pager [phone]

## 2020-03-07 NOTE — PROGRESS NOTES
Pharmacist Renal Dose Adjustment Note    Selma Lux is a 84 y.o. female being treated with the medication cefepime    Patient Data:    Vital Signs (Most Recent):  Temp: 97.1 °F (36.2 °C) (03/07/20 0803)  Pulse: 77 (03/07/20 0810)  Resp: 15 (03/07/20 0810)  BP: 134/61 (03/07/20 0803)  SpO2: 95 % (03/07/20 0810) Vital Signs (72h Range):  Temp:  [96.8 °F (36 °C)-100.5 °F (38.1 °C)]   Pulse:  []   Resp:  [15-41]   BP: (105-138)/(53-66)   SpO2:  [83 %-100 %]      Recent Labs   Lab 03/05/20  0501 03/06/20  0441 03/07/20  0356   CREATININE 1.3 1.5* 1.4     Serum creatinine: 1.4 mg/dL 03/07/20 0356  Estimated creatinine clearance: 32.6 mL/min    Medication: cefepime 2 g every 12 hours will be changed to medication: cefepime 2 g every 8 hours.     Pharmacist's Name: Theron Elkins  Pharmacist's Extension: [phone]

## 2020-03-08 LAB
ANION GAP SERPL CALC-SCNC: 6 MMOL/L (ref 8–16)
BASOPHILS # BLD AUTO: 0.01 K/UL (ref 0–0.2)
BASOPHILS NFR BLD: 0.1 % (ref 0–1.9)
BUN SERPL-MCNC: 32 MG/DL (ref 8–23)
CALCIUM SERPL-MCNC: 8.3 MG/DL (ref 8.7–10.5)
CHLORIDE SERPL-SCNC: 104 MMOL/L (ref 95–110)
CO2 SERPL-SCNC: 24 MMOL/L (ref 23–29)
CREAT SERPL-MCNC: 1.2 MG/DL (ref 0.5–1.4)
DIFFERENTIAL METHOD: ABNORMAL
EOSINOPHIL # BLD AUTO: 0 K/UL (ref 0–0.5)
EOSINOPHIL NFR BLD: 0.3 % (ref 0–8)
ERYTHROCYTE [DISTWIDTH] IN BLOOD BY AUTOMATED COUNT: 16.3 % (ref 11.5–14.5)
EST. GFR  (AFRICAN AMERICAN): 48.6 ML/MIN/1.73 M^2
EST. GFR  (NON AFRICAN AMERICAN): 42.2 ML/MIN/1.73 M^2
GLUCOSE SERPL-MCNC: 102 MG/DL (ref 70–110)
HCT VFR BLD AUTO: 34.8 % (ref 37–48.5)
HGB BLD-MCNC: 10.1 G/DL (ref 12–16)
IMM GRANULOCYTES # BLD AUTO: 0.09 K/UL (ref 0–0.04)
IMM GRANULOCYTES NFR BLD AUTO: 0.8 % (ref 0–0.5)
LYMPHOCYTES # BLD AUTO: 1.5 K/UL (ref 1–4.8)
LYMPHOCYTES NFR BLD: 12.7 % (ref 18–48)
MAGNESIUM SERPL-MCNC: 2.2 MG/DL (ref 1.6–2.6)
MCH RBC QN AUTO: 25.3 PG (ref 27–31)
MCHC RBC AUTO-ENTMCNC: 29 G/DL (ref 32–36)
MCV RBC AUTO: 87 FL (ref 82–98)
MONOCYTES # BLD AUTO: 1.5 K/UL (ref 0.3–1)
MONOCYTES NFR BLD: 12.2 % (ref 4–15)
NEUTROPHILS # BLD AUTO: 8.8 K/UL (ref 1.8–7.7)
NEUTROPHILS NFR BLD: 73.9 % (ref 38–73)
NRBC BLD-RTO: 0 /100 WBC
PHOSPHATE SERPL-MCNC: 3 MG/DL (ref 2.7–4.5)
PLATELET # BLD AUTO: 405 K/UL (ref 150–350)
PMV BLD AUTO: 10.2 FL (ref 9.2–12.9)
POTASSIUM SERPL-SCNC: 4.6 MMOL/L (ref 3.5–5.1)
RBC # BLD AUTO: 3.99 M/UL (ref 4–5.4)
SODIUM SERPL-SCNC: 134 MMOL/L (ref 136–145)
WBC # BLD AUTO: 11.85 K/UL (ref 3.9–12.7)

## 2020-03-08 PROCEDURE — 63600175 PHARM REV CODE 636 W HCPCS: Performed by: INTERNAL MEDICINE

## 2020-03-08 PROCEDURE — 85025 COMPLETE CBC W/AUTO DIFF WBC: CPT

## 2020-03-08 PROCEDURE — 80048 BASIC METABOLIC PNL TOTAL CA: CPT

## 2020-03-08 PROCEDURE — 99900035 HC TECH TIME PER 15 MIN (STAT)

## 2020-03-08 PROCEDURE — 99232 SBSQ HOSP IP/OBS MODERATE 35: CPT | Mod: GC,,, | Performed by: INTERNAL MEDICINE

## 2020-03-08 PROCEDURE — 94761 N-INVAS EAR/PLS OXIMETRY MLT: CPT

## 2020-03-08 PROCEDURE — 99231 PR SUBSEQUENT HOSPITAL CARE,LEVL I: ICD-10-PCS | Mod: ,,, | Performed by: INTERNAL MEDICINE

## 2020-03-08 PROCEDURE — 84100 ASSAY OF PHOSPHORUS: CPT

## 2020-03-08 PROCEDURE — 94640 AIRWAY INHALATION TREATMENT: CPT

## 2020-03-08 PROCEDURE — 99231 SBSQ HOSP IP/OBS SF/LOW 25: CPT | Mod: ,,, | Performed by: INTERNAL MEDICINE

## 2020-03-08 PROCEDURE — 25000242 PHARM REV CODE 250 ALT 637 W/ HCPCS: Performed by: INTERNAL MEDICINE

## 2020-03-08 PROCEDURE — 27000221 HC OXYGEN, UP TO 24 HOURS

## 2020-03-08 PROCEDURE — 36415 COLL VENOUS BLD VENIPUNCTURE: CPT

## 2020-03-08 PROCEDURE — 25000003 PHARM REV CODE 250: Performed by: INTERNAL MEDICINE

## 2020-03-08 PROCEDURE — 11000001 HC ACUTE MED/SURG PRIVATE ROOM

## 2020-03-08 PROCEDURE — 99232 PR SUBSEQUENT HOSPITAL CARE,LEVL II: ICD-10-PCS | Mod: GC,,, | Performed by: INTERNAL MEDICINE

## 2020-03-08 PROCEDURE — 83735 ASSAY OF MAGNESIUM: CPT

## 2020-03-08 RX ADMIN — CEFEPIME 2 G: 2 INJECTION, POWDER, FOR SOLUTION INTRAVENOUS at 09:03

## 2020-03-08 RX ADMIN — Medication 6 MG: at 09:03

## 2020-03-08 RX ADMIN — CEFEPIME 2 G: 2 INJECTION, POWDER, FOR SOLUTION INTRAVENOUS at 05:03

## 2020-03-08 RX ADMIN — IPRATROPIUM BROMIDE AND ALBUTEROL SULFATE 3 ML: .5; 3 SOLUTION RESPIRATORY (INHALATION) at 09:03

## 2020-03-08 RX ADMIN — IPRATROPIUM BROMIDE AND ALBUTEROL SULFATE 3 ML: .5; 3 SOLUTION RESPIRATORY (INHALATION) at 12:03

## 2020-03-08 RX ADMIN — FUROSEMIDE 40 MG: 40 TABLET ORAL at 08:03

## 2020-03-08 RX ADMIN — PREDNISONE 60 MG: 20 TABLET ORAL at 08:03

## 2020-03-08 RX ADMIN — ATORVASTATIN CALCIUM 40 MG: 20 TABLET, FILM COATED ORAL at 08:03

## 2020-03-08 RX ADMIN — CEFEPIME 2 G: 2 INJECTION, POWDER, FOR SOLUTION INTRAVENOUS at 04:03

## 2020-03-08 RX ADMIN — ENOXAPARIN SODIUM 40 MG: 100 INJECTION SUBCUTANEOUS at 05:03

## 2020-03-08 RX ADMIN — PANTOPRAZOLE SODIUM 40 MG: 40 TABLET, DELAYED RELEASE ORAL at 08:03

## 2020-03-08 RX ADMIN — ASPIRIN 81 MG: 81 TABLET, COATED ORAL at 08:03

## 2020-03-08 RX ADMIN — HYDROXYCHLOROQUINE SULFATE 200 MG: 200 TABLET, FILM COATED ORAL at 08:03

## 2020-03-08 RX ADMIN — IPRATROPIUM BROMIDE AND ALBUTEROL SULFATE 3 ML: .5; 3 SOLUTION RESPIRATORY (INHALATION) at 04:03

## 2020-03-08 RX ADMIN — IPRATROPIUM BROMIDE AND ALBUTEROL SULFATE 3 ML: .5; 3 SOLUTION RESPIRATORY (INHALATION) at 08:03

## 2020-03-08 NOTE — PROGRESS NOTES
[facility] Hospital Medicine                                                                     Progress Note     Team: [facility] [place] Gerber Shaw MD   Admit Date: 2/27/2020   Hospital Day: 10  LETICIA: 3/9/2020  Code status: Full Code   Principal Problem: Acute respiratory failure with hypoxia     SUMMARY:     Selma Lux is a 84 y.o. 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 She states that her PT was commenting on how much weaker she had gotten as well. The patient does complain of not being able to do the things she would like to anymore and that she was probably going to see a psychiatrist soon. Patient is on valium, gabapentin, and baclofen. Per patients daughter she only takes valium and higher dose of gabapentin at night for sleep. Baclofen is only taken once a day now, per her PCP the last time she saw her in the clinic. In ED, CXR with infiltrates and UA concerning for UTI. Medicine called for admission.     Admitted to hospital medicine for acute metabolic encephalopathy suspected secondary to UTI and CAP. Obtained urine and started on BSabx. Unfortunately for blood cultures not obtained, ordered, NGTD. Urine with E Coli. Hypoglycemia noted, suspecting from infection and poor po intake. Since admission patient has gradually improved with supportive care. Changed IV abx to PO. PT OT rec SNF, plan  was discharge to SNF. Unfortunately 3/2 morning patient was found to be hypoxic Sp02 88% and lethargic. Stat ABG and CTA ordered. No piror hx of FREEDOM. Respiratory panel negative. ABG with respiratory alkalosis. CTA with no PE but noting worsening consolidations, will consult pulmonary. Restarted BSabx. Pulmonary offered bronchoscopy due to hx of , however patient/daughter declined at this time. Plan to continue BSabx for now, and now increased home steroids as per pulmonary. De-escalated abx to cefepime for total 7 days as per pulmonary. Plan to continue high dose steroids for a few weeks before beginning to taper off. Started on bactrim MWF while she remains of >20mg prednisone per day. PT OT continue to work with patient.     Remains stable on abx. Plan to d/c to SNF after cepefime treatment, Tuesday 3/10    SUBJECTIVE:     Pt was seen and examined at bedside. Stable overnight. No issues overnight. Slept well. No new complaints. Mentation at baseline. Cough improving with less sputum. Dyspnea stable. Family at bedside updated.       ROS (Positive in Bold, otherwise negative)  Pain Scale: 0 /10   Constitutional: fever, chills, night sweats, generalized weakness   CV: chest pain, edema, palpitations  Resp: SOB, cough, sputum production  GI: changes in appetite, NVDC, pain, melena, hematochezia, GERD, hematemesis  : Dysuria, hematuria, urinary urgency, frequency  MSK: arthralgia/myalgia, joint swelling  SKIN: rashes, pruritis, petechiae   Neuro/Psych: FND, anxiety, depression      OBJECTIVE:     Vitals:  Temp:  [96.7 °F (35.9 °C)-98.6 °F (37 °C)]   Pulse:  [68-88]   Resp:  [16-18]   BP: (104-130)/(55-73)   SpO2:  [94 %-99 %]      I & O (Last 24H):     Intake/Output Summary (Last 24 hours) at 3/8/2020 1133  Last data filed at 3/8/2020 0400  Gross per 24 hour   Intake 740 ml   Output 900 ml   Net -160 ml       GEN: AA female  in no acute distress. Resting in bed. Cooperative. Calm.  HEENT: NCAT. PERRL. EOMI.  Conjunctivae/corneas clear, sclera Anicteric.  CVS: RRR. Normal s1 s2 +murmur, no click, rub or gallop  LUNL NC saturating > 90%. Normal respiratory effort. No wheezes. Improving Bibasilar crackles L>R.  ABD: Normoactive BS, soft, NT, distended, no masses or organomegaly.  EXT: no LE edema. No cyanosis. Full ROM.  SKIN: color, texture, turgor normal. No rashes or lesions  NEURO: Alert, oriented x 3 this AM, grossly normal, globally weak      All recent labs and imaging has been reviewed.     Recent Results (from the past 24 hour(s))   Basic metabolic panel    Collection Time: 20  7:19 AM   Result Value Ref Range    Sodium 134 (L) 136 - 145 mmol/L    Potassium 4.6 3.5 - 5.1 mmol/L    Chloride 104 95 - 110 mmol/L    CO2 24 23 - 29 mmol/L    Glucose 102 70 - 110 mg/dL    BUN, Bld 32 (H) 8 - 23 mg/dL    Creatinine 1.2 0.5 - 1.4 mg/dL    Calcium 8.3 (L) 8.7 - 10.5 mg/dL    Anion Gap 6 (L) 8 - 16 mmol/L    eGFR if African American 48.6 (A) >60 mL/min/1.73 m^2    eGFR if non  42.2 (A) >60 mL/min/1.73 m^2   CBC auto differential    Collection Time: 20  7:19 AM   Result Value Ref Range    WBC 11.85 3.90 - 12.70 K/uL    RBC 3.99 (L) 4.00 - 5.40 M/uL    Hemoglobin 10.1 (L) 12.0 - 16.0 g/dL    Hematocrit 34.8 (L) 37.0 - 48.5 %    Mean Corpuscular Volume 87 82 - 98 fL    Mean Corpuscular Hemoglobin 25.3 (L) 27.0 - 31.0 pg    Mean Corpuscular Hemoglobin Conc 29.0 (L) 32.0 - 36.0 g/dL    RDW 16.3 (H) 11.5 - 14.5 %    Platelets 405 (H) 150 - 350 K/uL    MPV 10.2 9.2 - 12.9 fL    Immature Granulocytes 0.8 (H) 0.0 - 0.5 %    Gran # (ANC) 8.8 (H) 1.8 - 7.7 K/uL    Immature Grans (Abs) 0.09 (H) 0.00 - 0.04 K/uL    Lymph # 1.5 1.0 - 4.8 K/uL    Mono # 1.5 (H) 0.3 - 1.0 K/uL    Eos # 0.0 0.0 - 0.5 K/uL    Baso # 0.01 0.00 - 0.20 K/uL    nRBC 0 0 /100 WBC    Gran% 73.9 (H) 38.0 - 73.0 %    Lymph% 12.7 (L) 18.0 - 48.0 %    Mono% 12.2 4.0 - 15.0 %    Eosinophil% 0.3 0.0 - 8.0 %    Basophil% 0.1 0.0 - 1.9 %     Differential Method Automated    Magnesium    Collection Time: 03/08/20  7:19 AM   Result Value Ref Range    Magnesium 2.2 1.6 - 2.6 mg/dL   Phosphorus    Collection Time: 03/08/20  7:19 AM   Result Value Ref Range    Phosphorus 3.0 2.7 - 4.5 mg/dL       Recent Labs   Lab 03/01/20  1142 03/01/20  1729 03/01/20  2147 03/02/20  1028 03/03/20  1135   POCTGLUCOSE 160* 146* 111* 117* 123*       Hemoglobin A1C   Date Value Ref Range Status   08/27/2019 5.6 4.0 - 5.6 % Final     Comment:     ADA Screening Guidelines:  5.7-6.4%  Consistent with prediabetes  >or=6.5%  Consistent with diabetes  High levels of fetal hemoglobin interfere with the HbA1C  assay. Heterozygous hemoglobin variants (HbS, HgC, etc)do  not significantly interfere with this assay.   However, presence of multiple variants may affect accuracy.     03/13/2019 5.7 (H) 4.0 - 5.6 % Final     Comment:     ADA Screening Guidelines:  5.7-6.4%  Consistent with prediabetes  >or=6.5%  Consistent with diabetes  High levels of fetal hemoglobin interfere with the HbA1C  assay. Heterozygous hemoglobin variants (HbS, HgC, etc)do  not significantly interfere with this assay.   However, presence of multiple variants may affect accuracy.     08/24/2017 5.5 4.0 - 5.6 % Final     Comment:     According to ADA guidelines, hemoglobin A1c <7.0% represents  optimal control in non-pregnant diabetic patients. Different  metrics may apply to specific patient populations.   Standards of Medical Care in Diabetes-2016.  For the purpose of screening for the presence of diabetes:  <5.7%     Consistent with the absence of diabetes  5.7-6.4%  Consistent with increasing risk for diabetes   (prediabetes)  >or=6.5%  Consistent with diabetes  Currently, no consensus exists for use of hemoglobin A1c  for diagnosis of diabetes for children.  This Hemoglobin A1c assay has significant interference with fetal   hemoglobin   (HbF). The results are invalid for patients with abnormal amounts of    HbF,   including those with known Hereditary Persistence   of Fetal Hemoglobin. Heterozygous hemoglobin variants (HbAS, HbAC,   HbAD, HbAE, HbA2) do not significantly interfere with this assay;   however, presence of multiple variants in a sample may impact the %   interference.          Active Hospital Problems    Diagnosis  POA    *Acute respiratory failure with hypoxia [J96.01]  No    Acute cystitis without hematuria [N30.00]  Yes    Arthritis [M19.90]  Yes    Hyperlipidemia [E78.5]  Yes    Multinodular goiter [E04.2]  Yes    AF (paroxysmal atrial fibrillation) [I48.0]  Yes    Generalized weakness [R53.1]  Yes    Hypertension, essential [I10]  Yes    Iron deficiency anemia secondary to inadequate dietary iron intake [D50.8]  Yes      Resolved Hospital Problems   No resolved problems to display.          ASSESSMENT AND PLAN:     Acute hypoxic respiratory failure  Community acquired pneumonia  Cryptogenic organizing pneumonia on chronic prednisone  Pulmonary HTN secondary to ILD  - On admit - CXR with bibasilar infiltrates however no fever or cough  - started on azithro and rocephin which was transitioned to keflex for UTI as patient didn't have any fever, dyspnea or cough  - 3/2 morning patient was found to be hypoxic Sp02 88% and lethargic.   - Stat ABG and CTA ordered. ABG with respiratory alkalosis  - CTA with worsening consolidations, restarted BSabx for now, and consulted pulmonary for therapy guidance considering her ILD and   - Bronchoscopy by pulm due to hx of , however patient/daughter declined  - Patient was attempted on CPAP overnight for concerns of underlying FREEDOM, and patient tolerated well with resultant good sleep as per daughter/patient. However she refused this overnight  - 3/4 AM hypoxic around mild low 80s, placed on HFNC, CXR and ABG reviewed, discussed with pulmonary. Increased home steroids, given one dose 80mg IV steroids once as per pulmonary and one dose 40mg IV lasix with  good results  - Continue increased prednisone for now, will need to taper after continuing this for few weeks  - Bactrium as per pulmonary for PCP ppx (>20mg steroids daily)  - De-escalate abx to total 7 days of cefepime (last dose 3/9)  - Restart MMF once off antibiotics   - When on MMF, will decrease to prednisone 60mg daily.  - Duonebs scheduled and supplemental oxygen PRN  - IS provided  - Plan to d/c to SNF after cepefime treatment, tuesday  - Will need pulmonary fu on discharge     Acute cystitis - resolved  - symptoms concerning for UTI with altered mentation  - UA positive for bacteria and WBC, leuk and nitrates  - given fluids and rocephin  - urine cx with e coli, changed ceftriaxone to keflex - finished tx  - bcx not taken in ED, ordered, NGTD   - afebrile and HDS    Hx of vascular dementia   Acute metabolic encephalopathy  - metabolic secondary to above  - she continued to have intermittent confusion at night, and early AM suspecting from underlying age/dementia  - Patient is on valium, gabapentin, and baclofen, likely contributed to this  - d/c home valium, decrease gabapentin dose, continue prn baclofen  - ABG with no hypercapnia     Generalized weakness  Multiple falls at home  - secondary to above issues  - PT OT rec SNF  - CM SW updated, they have sent referrals    HFrEF  - last TTE in 8/2019 with 40% EF  - appears compensated at this time  - repeat TTE with EF 55% and grade 1 DD, normal CVP    HTN  - hold home amlodipine for now    RA  - hold cellcept for now due to infection  - continue home plaquenil and steroids  - resume cellcept once off abx    HLD  - continue statin    Leukocytosis  - suspected reactive to above and from steroid use    Long QT interval   - last qtC 311 on 2/27/2020    Hypomagnesemia - resolved  - replete and recheck     Hypokalemia - resolved   - replete and recheck       Prophylaxis- lovenox  Code Status- Full Code   Discharge plan and follow up - d/c to SNF as per PT OT once  medically stable    Gerber Shaw MD  Hospital Medicine Staff  Pager [phone]

## 2020-03-08 NOTE — NURSING
Pt in bed with no complaints voiced, no acute distress noted at this time.  Frequent assessments ongoing.  No complaints of pain voiced.

## 2020-03-09 LAB
ANION GAP SERPL CALC-SCNC: 8 MMOL/L (ref 8–16)
BASOPHILS # BLD AUTO: 0.02 K/UL (ref 0–0.2)
BASOPHILS NFR BLD: 0.1 % (ref 0–1.9)
BUN SERPL-MCNC: 31 MG/DL (ref 8–23)
CALCIUM SERPL-MCNC: 8.3 MG/DL (ref 8.7–10.5)
CHLORIDE SERPL-SCNC: 104 MMOL/L (ref 95–110)
CO2 SERPL-SCNC: 24 MMOL/L (ref 23–29)
CREAT SERPL-MCNC: 1.2 MG/DL (ref 0.5–1.4)
DIFFERENTIAL METHOD: ABNORMAL
EOSINOPHIL # BLD AUTO: 0.1 K/UL (ref 0–0.5)
EOSINOPHIL NFR BLD: 0.9 % (ref 0–8)
ERYTHROCYTE [DISTWIDTH] IN BLOOD BY AUTOMATED COUNT: 16.5 % (ref 11.5–14.5)
EST. GFR  (AFRICAN AMERICAN): 48.6 ML/MIN/1.73 M^2
EST. GFR  (NON AFRICAN AMERICAN): 42.2 ML/MIN/1.73 M^2
GLUCOSE SERPL-MCNC: 89 MG/DL (ref 70–110)
HCT VFR BLD AUTO: 33.3 % (ref 37–48.5)
HGB BLD-MCNC: 9.7 G/DL (ref 12–16)
IMM GRANULOCYTES # BLD AUTO: 0.18 K/UL (ref 0–0.04)
IMM GRANULOCYTES NFR BLD AUTO: 1.2 % (ref 0–0.5)
LYMPHOCYTES # BLD AUTO: 2.6 K/UL (ref 1–4.8)
LYMPHOCYTES NFR BLD: 17.3 % (ref 18–48)
MAGNESIUM SERPL-MCNC: 2.1 MG/DL (ref 1.6–2.6)
MCH RBC QN AUTO: 25.6 PG (ref 27–31)
MCHC RBC AUTO-ENTMCNC: 29.1 G/DL (ref 32–36)
MCV RBC AUTO: 88 FL (ref 82–98)
MONOCYTES # BLD AUTO: 1.6 K/UL (ref 0.3–1)
MONOCYTES NFR BLD: 10.7 % (ref 4–15)
NEUTROPHILS # BLD AUTO: 10.3 K/UL (ref 1.8–7.7)
NEUTROPHILS NFR BLD: 69.8 % (ref 38–73)
NRBC BLD-RTO: 0 /100 WBC
PHOSPHATE SERPL-MCNC: 2.6 MG/DL (ref 2.7–4.5)
PLATELET # BLD AUTO: 413 K/UL (ref 150–350)
PMV BLD AUTO: 10.6 FL (ref 9.2–12.9)
POTASSIUM SERPL-SCNC: 4.4 MMOL/L (ref 3.5–5.1)
RBC # BLD AUTO: 3.79 M/UL (ref 4–5.4)
SODIUM SERPL-SCNC: 136 MMOL/L (ref 136–145)
WBC # BLD AUTO: 14.82 K/UL (ref 3.9–12.7)

## 2020-03-09 PROCEDURE — 25000003 PHARM REV CODE 250: Performed by: INTERNAL MEDICINE

## 2020-03-09 PROCEDURE — 99231 SBSQ HOSP IP/OBS SF/LOW 25: CPT | Mod: ,,, | Performed by: INTERNAL MEDICINE

## 2020-03-09 PROCEDURE — 84100 ASSAY OF PHOSPHORUS: CPT

## 2020-03-09 PROCEDURE — 97535 SELF CARE MNGMENT TRAINING: CPT

## 2020-03-09 PROCEDURE — 27000221 HC OXYGEN, UP TO 24 HOURS

## 2020-03-09 PROCEDURE — 99900035 HC TECH TIME PER 15 MIN (STAT)

## 2020-03-09 PROCEDURE — 80048 BASIC METABOLIC PNL TOTAL CA: CPT

## 2020-03-09 PROCEDURE — 25000242 PHARM REV CODE 250 ALT 637 W/ HCPCS: Performed by: INTERNAL MEDICINE

## 2020-03-09 PROCEDURE — 11000001 HC ACUTE MED/SURG PRIVATE ROOM

## 2020-03-09 PROCEDURE — 97116 GAIT TRAINING THERAPY: CPT

## 2020-03-09 PROCEDURE — 36415 COLL VENOUS BLD VENIPUNCTURE: CPT

## 2020-03-09 PROCEDURE — 63600175 PHARM REV CODE 636 W HCPCS: Performed by: INTERNAL MEDICINE

## 2020-03-09 PROCEDURE — 99231 PR SUBSEQUENT HOSPITAL CARE,LEVL I: ICD-10-PCS | Mod: ,,, | Performed by: INTERNAL MEDICINE

## 2020-03-09 PROCEDURE — 94761 N-INVAS EAR/PLS OXIMETRY MLT: CPT

## 2020-03-09 PROCEDURE — 94640 AIRWAY INHALATION TREATMENT: CPT

## 2020-03-09 PROCEDURE — 83735 ASSAY OF MAGNESIUM: CPT

## 2020-03-09 PROCEDURE — 85025 COMPLETE CBC W/AUTO DIFF WBC: CPT

## 2020-03-09 RX ADMIN — IPRATROPIUM BROMIDE AND ALBUTEROL SULFATE 3 ML: .5; 3 SOLUTION RESPIRATORY (INHALATION) at 04:03

## 2020-03-09 RX ADMIN — CEFEPIME 2 G: 2 INJECTION, POWDER, FOR SOLUTION INTRAVENOUS at 05:03

## 2020-03-09 RX ADMIN — ERGOCALCIFEROL 50000 UNITS: 1.25 CAPSULE ORAL at 09:03

## 2020-03-09 RX ADMIN — DIBASIC SODIUM PHOSPHATE, MONOBASIC POTASSIUM PHOSPHATE AND MONOBASIC SODIUM PHOSPHATE 2 TABLET: 852; 155; 130 TABLET ORAL at 02:03

## 2020-03-09 RX ADMIN — IPRATROPIUM BROMIDE AND ALBUTEROL SULFATE 3 ML: .5; 3 SOLUTION RESPIRATORY (INHALATION) at 12:03

## 2020-03-09 RX ADMIN — FUROSEMIDE 40 MG: 40 TABLET ORAL at 09:03

## 2020-03-09 RX ADMIN — SULFAMETHOXAZOLE AND TRIMETHOPRIM 1 TABLET: 800; 160 TABLET ORAL at 09:03

## 2020-03-09 RX ADMIN — DIBASIC SODIUM PHOSPHATE, MONOBASIC POTASSIUM PHOSPHATE AND MONOBASIC SODIUM PHOSPHATE 2 TABLET: 852; 155; 130 TABLET ORAL at 10:03

## 2020-03-09 RX ADMIN — Medication 6 MG: at 09:03

## 2020-03-09 RX ADMIN — PANTOPRAZOLE SODIUM 40 MG: 40 TABLET, DELAYED RELEASE ORAL at 09:03

## 2020-03-09 RX ADMIN — PREDNISONE 60 MG: 20 TABLET ORAL at 09:03

## 2020-03-09 RX ADMIN — IPRATROPIUM BROMIDE AND ALBUTEROL SULFATE 3 ML: .5; 3 SOLUTION RESPIRATORY (INHALATION) at 08:03

## 2020-03-09 RX ADMIN — ACETAMINOPHEN 650 MG: 325 TABLET ORAL at 06:03

## 2020-03-09 RX ADMIN — HYDROXYCHLOROQUINE SULFATE 200 MG: 200 TABLET, FILM COATED ORAL at 09:03

## 2020-03-09 RX ADMIN — IPRATROPIUM BROMIDE AND ALBUTEROL SULFATE 3 ML: .5; 3 SOLUTION RESPIRATORY (INHALATION) at 11:03

## 2020-03-09 RX ADMIN — ATORVASTATIN CALCIUM 40 MG: 20 TABLET, FILM COATED ORAL at 09:03

## 2020-03-09 RX ADMIN — ASPIRIN 81 MG: 81 TABLET, COATED ORAL at 09:03

## 2020-03-09 NOTE — PROGRESS NOTES
[facility]  Pulmonology  Progress Note    Patient Name: Selma Lux  MRN: [id number]  Admission Date: 2/27/2020  Hospital Length of Stay: 10 days  Code Status: Full Code  Attending Provider: Gerber Shaw MD  Primary Care Provider: Bhargav Hirsch MD   Principal Problem: Acute respiratory failure with hypoxia    Subjective:     Interval History: Persistent coughing, but breathing is feeling better. Still with limited endurance.    Objective:     Vital Signs (Most Recent):  Temp: 98.5 °F (36.9 °C) (03/08/20 1116)  Pulse: 84 (03/08/20 1900)  Resp: 18 (03/08/20 1202)  BP: 130/60 (03/08/20 1116)  SpO2: 97 % (03/08/20 1202) Vital Signs (24h Range):  Temp:  [97.7 °F (36.5 °C)-98.5 °F (36.9 °C)] 98.5 °F (36.9 °C)  Pulse:  [73-84] 84  Resp:  [17-18] 18  SpO2:  [96 %-99 %] 97 %  BP: (124-130)/(58-60) 130/60     Weight: 84.4 kg (186 lb)  Body mass index is 31.93 kg/m².      Intake/Output Summary (Last 24 hours) at 3/8/2020 1947  Last data filed at 3/8/2020 0400  Gross per 24 hour   Intake 740 ml   Output --   Net 740 ml       Physical Exam   Constitutional: She appears well-developed and well-nourished.   Sitting up in the chair,    HENT:   Head: Normocephalic and atraumatic.   Eyes: No scleral icterus.   Neck: Neck supple.   Cardiovascular: Normal rate and regular rhythm.   Pulmonary/Chest: Effort normal. No respiratory distress.   3L NC, improved basilar rhales, no wheezing   Abdominal: She exhibits no distension.   Musculoskeletal: She exhibits no edema or deformity.   Neurological: She is alert. Coordination normal.   Skin: Skin is warm and dry. No rash noted.       Vents:  Oxygen Concentration (%): 32 (03/07/20 0420)    Lines/Drains/Airways     Drain            Female External Urinary Catheter 02/29/20 0600 8 days          Peripheral Intravenous Line                 Peripheral IV - Single Lumen 03/08/20 1708 Right Antecubital less than 1 day                Significant Labs:    CBC/Anemia  Profile:  Recent Labs   Lab 03/07/20  0356 03/08/20  0719   WBC 12.32 11.85   HGB 9.9* 10.1*   HCT 33.6* 34.8*   * 405*   MCV 85 87   RDW 16.3* 16.3*        Chemistries:  Recent Labs   Lab 03/07/20  0356 03/08/20 0719    134*   K 4.4 4.6    104   CO2 25 24   BUN 32* 32*   CREATININE 1.4 1.2   CALCIUM 8.6* 8.3*   MG 2.2 2.2   PHOS 2.7 3.0       All pertinent labs within the past 24 hours have been reviewed.      Assessment/Plan:     * Acute respiratory failure with hypoxia  Acute hypoxic respiratory failure associated with fevers and peripheral opacities on CT not inconsistent with her previous episodes of . It is likely that her symptoms are related to a decrease in her prednisone dose as she has flared with this in the past. Of concern is that she could have developed CAP, and with immunosuppression, she is at risk for OIs. She has declined bronchoscopy to further evaluate for infectious causes. She has shown more improvement on steroids so far than with antibiotics.  -Con't cefepime to complete a 7-day course for possible pneumonia.  -Con't steroids at 60mg daily; will need high dose steroids for a few weeks before beginning to taper off.  -Will recommend restarting MMF once off antibiotics, which will hopefully facilitate her tolerating lower doses of steroids, although she may not tolerate stopping all together.   -Needs Bactrim MWF while she remains on >20mg prednisone per day.  -Con't plaquenil.  -Anticipate she will need supplemental O2 for a while, as she has from previous flares.           Sadia Bennett, DO  Pulmonology  [facility]

## 2020-03-09 NOTE — PLAN OF CARE
03/09/20 1531   Discharge Reassessment   Assessment Type Discharge Planning Reassessment   Provided patient/caregiver education on the expected discharge date and the discharge plan Yes   Do you have any problems affording any of your prescribed medications? No   Discharge Plan A Rehab   Discharge Plan B Skilled Nursing Facility   DME Needed Upon Discharge  none   Patient choice form signed by patient/caregiver N/A   Anticipated Discharge Disposition Rehab

## 2020-03-09 NOTE — PLAN OF CARE
Problem: Physical Therapy Goal  Goal: Physical Therapy Goal  Description  Goals to be met by: 3/13/2020     Patient will increase functional independence with mobility by performin. Supine to sit with Contact Guard Assistance  2. Sit to supine with Contact Guard Assistance  3. Sit to stand transfer with Minimal Assistance using Rolling Walker or LRAD-met 3/6/20  Revised goal: sit to stand with Rw with CGA-not met  4. Bed to chair transfer with Minimal Assistance using Rolling Walker or LRAD -Met 3/9/20  5. Gait  x 25 feet with Contact Guard Assistance using Rolling Walker. -partially met 3/6/20  Revised goal: gait 45ft with RW with CGA-not met  6. Sitting at edge of bed x10 minutes with Stand-by Assistance  7. Lower extremity exercise program x20 reps per handout, with independence       Outcome: Ongoing, Progressing     Jena Schuster, PT, DPT  3/9/2020

## 2020-03-09 NOTE — PLAN OF CARE
Problem: Occupational Therapy Goal  Goal: Occupational Therapy Goal  Description  Goals to be met by: 3/8/20    Patient will increase functional independence with ADLs by performing:    Feeding with Set-up Assistance.- progressing   UE Dressing with Minimal Assistance.  LE Dressing with Minimal Assistance.- initiated   Grooming while standing at bedside with Minimal Assistance with AD as needed to prepare for task in standing at sink.  Toileting from bedside commode with Minimal Assistance for hygiene and clothing management.   Stand pivot transfers with Minimal Assistance to reach stable surface.  Toilet transfer to bedside commode with Minimal Assistance.      3/9/2020 1454 by AYAD Martino  Outcome: Ongoing, Progressing

## 2020-03-09 NOTE — PT/OT/SLP PROGRESS
Physical Therapy Treatment    Patient Name:  Selma Lux   MRN:  [id number]    Recommendations:     Discharge Recommendations:  rehabilitation facility   Discharge Equipment Recommendations: none   Barriers to discharge: decreased level of functional mobility    Assessment:     Selma Lux is a 84 y.o. female admitted with a medical diagnosis of Acute respiratory failure with hypoxia.  She presents with the following impairments/functional limitations:  weakness, impaired endurance, impaired self care skills, impaired functional mobilty, impaired cardiopulmonary response to activity Co treat with OT due to patient requiring increased assistance in past sessions. Patient demonstrates excellent progress with functional mobility and motivation today. Session primarily limited by decreased endurance.Patient Tamir for sit to stand. CGA for bed to chair transfer, toilet transfer and gait with RW. Gait with RW 12' +10' into restroom and back. SOB noted and patient returned to chair. This patient is an excellent candidate for inpatient rehabilitation, has a qualifying diagnosis, shows increasing activity tolerance,  is motivated to participate in treatment, and has a supportive family willing to assist the patient upon discharge.     Rehab Prognosis: Good; patient would benefit from acute skilled PT services to address these deficits and reach maximum level of function.    Recent Surgery: * No surgery found *      Plan:     During this hospitalization, patient to be seen 4 x/week to address the identified rehab impairments via gait training, therapeutic activities, therapeutic exercises, neuromuscular re-education and progress toward the following goals:    · Plan of Care Expires:  03/28/20    Subjective     Chief Complaint: fatigue  Patient/Family Comments/goals: to get back home soon  Pain/Comfort:  · Pain Rating 1: 0/10  · Pain Rating Post-Intervention 1: 0/10      Objective:     Communicated with nurse  prior to session.  Patient found up in chair with telemetry, PureWick, oxygen upon PT entry to room.     General Precautions: Standard, fall   Orthopedic Precautions:N/A   Braces: N/A     Functional Mobility:  · Transfers:     · Sit to Stand:  minimum assistance with rolling walker,  Patient instructed on proper hand placement with RW for safe transfers.   · Bed to Chair: contact guard assistance with  rolling walker  using  Stand Pivot, v/t cues for walker management  · Toilet Transfer: contact guard assistance with  rolling walker  using  Step Transfer, v/t cues for walker management  · Gait: 12' + 10' w/ RW, CGA. Patient using toilet in between trials. Decreased dora, decreased step length, increased LYNDSEY. Patient with SOB and knees buckling on 2nd trial 2* fatigue and requesting to sit, chair brought behind her      AM-PAC 6 CLICK MOBILITY  Turning over in bed (including adjusting bedclothes, sheets and blankets)?: 3  Sitting down on and standing up from a chair with arms (e.g., wheelchair, bedside commode, etc.): 3  Moving from lying on back to sitting on the side of the bed?: 3  Moving to and from a bed to a chair (including a wheelchair)?: 3  Need to walk in hospital room?: 3  Climbing 3-5 steps with a railing?: 2  Basic Mobility Total Score: 17       Therapeutic Activities and Exercises:   Pt educated on importance of OOB activity to decrease the risks associated with bed rest. Instruction provided for safety and technique for gait and transfers with RW.   Patient instructed on proper hand placement with RW for safe transfers. Pt educated on plan and goals with physical therapy. Instructed to call nursing for assistance with out of bed mobility.    Patient left up in chair with all lines intact and call button in reach..    GOALS:   Multidisciplinary Problems     Physical Therapy Goals        Problem: Physical Therapy Goal    Goal Priority Disciplines Outcome Goal Variances Interventions   Physical Therapy  Goal     PT, PT/OT Ongoing, Progressing     Description:  Goals to be met by: 3/13/2020     Patient will increase functional independence with mobility by performin. Supine to sit with Contact Guard Assistance  2. Sit to supine with Contact Guard Assistance  3. Sit to stand transfer with Minimal Assistance using Rolling Walker or LRAD-met 3/6/20  Revised goal: sit to stand with Rw with CGA-not met  4. Bed to chair transfer with Minimal Assistance using Rolling Walker or LRAD -Met 3/9/20  5. Gait  x 25 feet with Contact Guard Assistance using Rolling Walker. -partially met 3/6/20  Revised goal: gait 45ft with RW with CGA-not met  6. Sitting at edge of bed x10 minutes with Stand-by Assistance  7. Lower extremity exercise program x20 reps per handout, with independence                        Time Tracking:     PT Received On: 20  PT Start Time: 1343     PT Stop Time: 1403  PT Total Time (min): 20 min     Billable Minutes: Gait Training 12    Treatment Type: Treatment  PT/PTA: PT     PTA Visit Number: 0     Jena Schuster, PT  2020

## 2020-03-09 NOTE — PLAN OF CARE
Problem: Occupational Therapy Goal  Goal: Occupational Therapy Goal  Description  Goals to be met by: 3/8/20    Patient will increase functional independence with ADLs by performing:    Feeding with Set-up Assistance.- progressing   UE Dressing with Minimal Assistance.  LE Dressing with Minimal Assistance.- initiated   Grooming while standing at bedside with Minimal Assistance with AD as needed to prepare for task in standing at sink.  Toileting from bedside commode with Minimal Assistance for hygiene and clothing management.   Stand pivot transfers with Minimal Assistance to reach stable surface.  Toilet transfer to bedside commode with Minimal Assistance.      Outcome: Ongoing, Progressing

## 2020-03-09 NOTE — PT/OT/SLP PROGRESS
Occupational Therapy   Treatment    Name: Selma Lux  MRN: [id number]  Admitting Diagnosis:  Acute respiratory failure with hypoxia       Recommendations:     Discharge Recommendations: rehabilitation facility  Discharge Equipment Recommendations:  none  Barriers to discharge:  Decreased caregiver support    Assessment:     Selma Lux is a 84 y.o. female with a medical diagnosis of Acute respiratory failure with hypoxia.  She was able to perform sit/stand T/F and toilet T/F c CGA and RW.  Able to perform toilet hygiene and grooming c set-up.  Was able to walk to bathroom c CGA and RW.  Pt is progressing well. Has noted 3/5 L shoulder strength at this time. Performance deficits affecting function are impaired self care skills, impaired functional mobilty, orthopedic precautions, weakness, impaired endurance, impaired balance, impaired coordination, decreased ROM.     Rehab Prognosis:  Good; patient would benefit from acute skilled OT services to address these deficits and reach maximum level of function.       Plan:     Patient to be seen 4 x/week to address the above listed problems via self-care/home management, therapeutic activities, therapeutic exercises  · Plan of Care Expires: 03/28/20  · Plan of Care Reviewed with: patient    Subjective     Pain/Comfort:  · Pain Rating 1: 0/10    Objective:     Communicated with: RN prior to session.  Patient found up in chair with telemetry, PureWick, oxygen upon OT entry to room.    General Precautions: Standard, fall   Orthopedic Precautions:N/A   Braces: N/A     Occupational Performance:         Functional Mobility/Transfers:  · Patient completed Sit <> Stand Transfer with contact guard assistance  with  rolling walker   · Patient completed Toilet Transfer Stand Pivot technique with contact guard assistance with  rolling walker  · Functional Mobility: Pt was able to walk to bathroom c CGA and RW.    Activities of Daily Living:  · Grooming: stand by  assistance to wash hands using wipe.  · Toileting: modified independence for toilet hygiene.      [facility] 6 Click ADL: 16        Patient left up in chair with all lines intact, call button in reach and RN notifiedEducation:      GOALS:   Multidisciplinary Problems     Occupational Therapy Goals        Problem: Occupational Therapy Goal    Goal Priority Disciplines Outcome Interventions   Occupational Therapy Goal     OT, PT/OT Ongoing, Progressing    Description:  Goals to be met by: 3/8/20    Patient will increase functional independence with ADLs by performing:    Feeding with Set-up Assistance.- progressing   UE Dressing with Minimal Assistance.  LE Dressing with Minimal Assistance.- initiated   Grooming while standing at bedside with Minimal Assistance with AD as needed to prepare for task in standing at sink.  Toileting from bedside commode with Minimal Assistance for hygiene and clothing management.   Stand pivot transfers with Minimal Assistance to reach stable surface.  Toilet transfer to bedside commode with Minimal Assistance.                       Time Tracking:     OT Date of Treatment: 03/09/20  OT Start Time: 1345  OT Stop Time: 1405  OT Total Time (min): 20 min    Billable Minutes:Self Care/Home Management 20    AYAD Ospina  3/9/2020

## 2020-03-09 NOTE — SUBJECTIVE & OBJECTIVE
Interval History: Persistent coughing, but breathing is feeling better. Still with limited endurance.    Objective:     Vital Signs (Most Recent):  Temp: 98.5 °F (36.9 °C) (03/08/20 1116)  Pulse: 84 (03/08/20 1900)  Resp: 18 (03/08/20 1202)  BP: 130/60 (03/08/20 1116)  SpO2: 97 % (03/08/20 1202) Vital Signs (24h Range):  Temp:  [97.7 °F (36.5 °C)-98.5 °F (36.9 °C)] 98.5 °F (36.9 °C)  Pulse:  [73-84] 84  Resp:  [17-18] 18  SpO2:  [96 %-99 %] 97 %  BP: (124-130)/(58-60) 130/60     Weight: 84.4 kg (186 lb)  Body mass index is 31.93 kg/m².      Intake/Output Summary (Last 24 hours) at 3/8/2020 1947  Last data filed at 3/8/2020 0400  Gross per 24 hour   Intake 740 ml   Output --   Net 740 ml       Physical Exam   Constitutional: She appears well-developed and well-nourished.   Sitting up in the chair,    HENT:   Head: Normocephalic and atraumatic.   Eyes: No scleral icterus.   Neck: Neck supple.   Cardiovascular: Normal rate and regular rhythm.   Pulmonary/Chest: Effort normal. No respiratory distress.   3L NC, improved basilar rhales, no wheezing   Abdominal: She exhibits no distension.   Musculoskeletal: She exhibits no edema or deformity.   Neurological: She is alert. Coordination normal.   Skin: Skin is warm and dry. No rash noted.       Vents:  Oxygen Concentration (%): 32 (03/07/20 0420)    Lines/Drains/Airways     Drain            Female External Urinary Catheter 02/29/20 0600 8 days          Peripheral Intravenous Line                 Peripheral IV - Single Lumen 03/08/20 1708 Right Antecubital less than 1 day                Significant Labs:    CBC/Anemia Profile:  Recent Labs   Lab 03/07/20  0356 03/08/20  0719   WBC 12.32 11.85   HGB 9.9* 10.1*   HCT 33.6* 34.8*   * 405*   MCV 85 87   RDW 16.3* 16.3*        Chemistries:  Recent Labs   Lab 03/07/20  0356 03/08/20  0719    134*   K 4.4 4.6    104   CO2 25 24   BUN 32* 32*   CREATININE 1.4 1.2   CALCIUM 8.6* 8.3*   MG 2.2 2.2   PHOS 2.7 3.0        All pertinent labs within the past 24 hours have been reviewed.

## 2020-03-09 NOTE — PLAN OF CARE
Continue prednisone & cefepime. Restart MMF after cefepime and continue steroids until seen in Pulmonology clinic.     Please call with questions.    Sadia Bennett 03/09/2020 3:09 PM  [employer] Pul[facility] Fellow

## 2020-03-09 NOTE — ASSESSMENT & PLAN NOTE
Acute hypoxic respiratory failure associated with fevers and peripheral opacities on CT not inconsistent with her previous episodes of . It is likely that her symptoms are related to a decrease in her prednisone dose as she has flared with this in the past. Of concern is that she could have developed CAP, and with immunosuppression, she is at risk for OIs. She has declined bronchoscopy to further evaluate for infectious causes. She has shown more improvement on steroids so far than with antibiotics.  -Con't cefepime to complete a 7-day course for possible pneumonia.  -Con't steroids at 60mg daily; will need high dose steroids for a few weeks before beginning to taper off.  -Will recommend restarting MMF once off antibiotics, which will hopefully facilitate her tolerating lower doses of steroids, although she may not tolerate stopping all together.   -Needs Bactrim MWF while she remains on >20mg prednisone per day.  -Con't plaquenil.  -Anticipate she will need supplemental O2 for a while, as she has from previous flares.

## 2020-03-09 NOTE — PLAN OF CARE
POC reviewed with pt at 0300. Pt verbalized understanding. Questions and concerns addressed. No acute events overnight. Pt progressing toward goals. Will continue to monitor. See flowsheets for full assessment and VS info

## 2020-03-09 NOTE — PROGRESS NOTES
[facility] Hospital Medicine                                                                     Progress Note     Team: [facility] HOSP MED G Gerber Shaw MD   Admit Date: 2/27/2020   Hospital Day: 11  LETICIA: 3/10/2020  Code status: Full Code   Principal Problem: Acute respiratory failure with hypoxia     SUMMARY:     Selma Lux is a 84 y.o. 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 She states that her PT was commenting on how much weaker she had gotten as well. The patient does complain of not being able to do the things she would like to anymore and that she was probably going to see a psychiatrist soon. Patient is on valium, gabapentin, and baclofen. Per patients daughter she only takes valium and higher dose of gabapentin at night for sleep. Baclofen is only taken once a day now, per her PCP the last time she saw her in the clinic. In ED, CXR with infiltrates and UA concerning for UTI. Medicine called for admission.     Admitted to hospital medicine for acute metabolic encephalopathy suspected secondary to UTI and CAP. Obtained urine and started on BSabx. Unfortunately for blood cultures not obtained, ordered, NGTD. Urine with E Coli. Hypoglycemia noted, suspecting from infection and poor po intake. Since admission patient has gradually improved with supportive care. Changed IV abx to PO. PT OT rec SNF,  plan was discharge to SNF. Unfortunately 3/2 morning patient was found to be hypoxic Sp02 88% and lethargic. Stat ABG and CTA ordered. No piror hx of FREEDOM. Respiratory panel negative. ABG with respiratory alkalosis. CTA with no PE but noting worsening consolidations, will consult pulmonary. Restarted BSabx. Pulmonary offered bronchoscopy due to hx of , however patient/daughter declined at this time. Plan to continue BSabx for now, and now increased home steroids as per pulmonary. De-escalated abx to cefepime for total 7 days as per pulmonary. Plan to continue high dose steroids for a few weeks before beginning to taper off. Started on bactrim MWF while she remains of >20mg prednisone per day. PT OT continue to work with patient.     Remains stable on abx. Plan to d/c to SNF after cepefime treatment, Tuesday 3/10.    SUBJECTIVE:     Pt was seen and examined at bedside. Stable overnight. No issues overnight. No new complaints. Mentation at baseline. Cough and Dyspnea stable. Family not at bedside this AM. Attempted to call, no answer.        ROS (Positive in Bold, otherwise negative)  Pain Scale: 0 /10   Constitutional: fever, chills, night sweats, generalized weakness   CV: chest pain, edema, palpitations  Resp: SOB, cough, sputum production  GI: changes in appetite, NVDC, pain, melena, hematochezia, GERD, hematemesis  : Dysuria, hematuria, urinary urgency, frequency  MSK: arthralgia/myalgia, joint swelling  SKIN: rashes, pruritis, petechiae   Neuro/Psych: FND, anxiety, depression      OBJECTIVE:     Vitals:  Temp:  [98.1 °F (36.7 °C)-98.9 °F (37.2 °C)]   Pulse:  [73-87]   Resp:  [12-19]   BP: (116-128)/(58-62)   SpO2:  [92 %-100 %]      I & O (Last 24H):     Intake/Output Summary (Last 24 hours) at 3/9/2020 1357  Last data filed at 3/9/2020 0600  Gross per 24 hour   Intake 350 ml   Output 425 ml   Net -75 ml       GEN: AA female  in no acute distress. Resting in bed. Cooperative. Calm.  HEENT: NCAT. PERRL. EOMI.  Conjunctivae/corneas clear, sclera Anicteric.  CVS: RRR. Normal s1 s2 +murmur, no click, rub or gallop  LUNL NC saturating > 90%. Normal respiratory effort. No wheezes. Stable bibasilar crackles L>R.  ABD: Normoactive BS, soft, NT, distended, no masses or organomegaly.  EXT: no LE edema. No cyanosis. Full ROM.  SKIN: color, texture, turgor normal. No rashes or lesions  NEURO: Alert, oriented x 3 this AM, grossly normal, globally weak      All recent labs and imaging has been reviewed.     Recent Results (from the past 24 hour(s))   Basic metabolic panel    Collection Time: 20  5:16 AM   Result Value Ref Range    Sodium 136 136 - 145 mmol/L    Potassium 4.4 3.5 - 5.1 mmol/L    Chloride 104 95 - 110 mmol/L    CO2 24 23 - 29 mmol/L    Glucose 89 70 - 110 mg/dL    BUN, Bld 31 (H) 8 - 23 mg/dL    Creatinine 1.2 0.5 - 1.4 mg/dL    Calcium 8.3 (L) 8.7 - 10.5 mg/dL    Anion Gap 8 8 - 16 mmol/L    eGFR if African American 48.6 (A) >60 mL/min/1.73 m^2    eGFR if non  42.2 (A) >60 mL/min/1.73 m^2   CBC auto differential    Collection Time: 20  5:16 AM   Result Value Ref Range    WBC 14.82 (H) 3.90 - 12.70 K/uL    RBC 3.79 (L) 4.00 - 5.40 M/uL    Hemoglobin 9.7 (L) 12.0 - 16.0 g/dL    Hematocrit 33.3 (L) 37.0 - 48.5 %    Mean Corpuscular Volume 88 82 - 98 fL    Mean Corpuscular Hemoglobin 25.6 (L) 27.0 - 31.0 pg    Mean Corpuscular Hemoglobin Conc 29.1 (L) 32.0 - 36.0 g/dL    RDW 16.5 (H) 11.5 - 14.5 %    Platelets 413 (H) 150 - 350 K/uL    MPV 10.6 9.2 - 12.9 fL    Immature Granulocytes 1.2 (H) 0.0 - 0.5 %    Gran # (ANC) 10.3 (H) 1.8 - 7.7 K/uL    Immature Grans (Abs) 0.18 (H) 0.00 - 0.04 K/uL    Lymph # 2.6 1.0 - 4.8 K/uL    Mono # 1.6 (H) 0.3 - 1.0 K/uL    Eos # 0.1 0.0 - 0.5 K/uL    Baso # 0.02 0.00 - 0.20 K/uL    nRBC 0 0 /100 WBC    Gran% 69.8 38.0 - 73.0 %    Lymph% 17.3 (L) 18.0 - 48.0 %    Mono% 10.7 4.0 - 15.0 %    Eosinophil% 0.9 0.0 - 8.0 %    Basophil% 0.1 0.0 - 1.9 %     Differential Method Automated    Magnesium    Collection Time: 03/09/20  5:16 AM   Result Value Ref Range    Magnesium 2.1 1.6 - 2.6 mg/dL   Phosphorus    Collection Time: 03/09/20  5:16 AM   Result Value Ref Range    Phosphorus 2.6 (L) 2.7 - 4.5 mg/dL       Recent Labs   Lab 03/03/20  1135   POCTGLUCOSE 123*       Hemoglobin A1C   Date Value Ref Range Status   08/27/2019 5.6 4.0 - 5.6 % Final     Comment:     ADA Screening Guidelines:  5.7-6.4%  Consistent with prediabetes  >or=6.5%  Consistent with diabetes  High levels of fetal hemoglobin interfere with the HbA1C  assay. Heterozygous hemoglobin variants (HbS, HgC, etc)do  not significantly interfere with this assay.   However, presence of multiple variants may affect accuracy.     03/13/2019 5.7 (H) 4.0 - 5.6 % Final     Comment:     ADA Screening Guidelines:  5.7-6.4%  Consistent with prediabetes  >or=6.5%  Consistent with diabetes  High levels of fetal hemoglobin interfere with the HbA1C  assay. Heterozygous hemoglobin variants (HbS, HgC, etc)do  not significantly interfere with this assay.   However, presence of multiple variants may affect accuracy.     08/24/2017 5.5 4.0 - 5.6 % Final     Comment:     According to ADA guidelines, hemoglobin A1c <7.0% represents  optimal control in non-pregnant diabetic patients. Different  metrics may apply to specific patient populations.   Standards of Medical Care in Diabetes-2016.  For the purpose of screening for the presence of diabetes:  <5.7%     Consistent with the absence of diabetes  5.7-6.4%  Consistent with increasing risk for diabetes   (prediabetes)  >or=6.5%  Consistent with diabetes  Currently, no consensus exists for use of hemoglobin A1c  for diagnosis of diabetes for children.  This Hemoglobin A1c assay has significant interference with fetal   hemoglobin   (HbF). The results are invalid for patients with abnormal amounts of   HbF,   including those with known Hereditary Persistence   of Fetal Hemoglobin.  Heterozygous hemoglobin variants (HbAS, HbAC,   HbAD, HbAE, HbA2) do not significantly interfere with this assay;   however, presence of multiple variants in a sample may impact the %   interference.          Active Hospital Problems    Diagnosis  POA    *Acute respiratory failure with hypoxia [J96.01]  No    Acute cystitis without hematuria [N30.00]  Yes    Arthritis [M19.90]  Yes    Hyperlipidemia [E78.5]  Yes    Multinodular goiter [E04.2]  Yes    AF (paroxysmal atrial fibrillation) [I48.0]  Yes    Generalized weakness [R53.1]  Yes    Hypertension, essential [I10]  Yes    Iron deficiency anemia secondary to inadequate dietary iron intake [D50.8]  Yes      Resolved Hospital Problems   No resolved problems to display.          ASSESSMENT AND PLAN:     Acute hypoxic respiratory failure  Community acquired pneumonia  Cryptogenic organizing pneumonia on chronic prednisone  Pulmonary HTN secondary to ILD  - On admit - CXR with bibasilar infiltrates however no fever or cough  - started on azithro and rocephin which was transitioned to keflex for UTI as patient didn't have any fever, dyspnea or cough  - 3/2 morning patient was found to be hypoxic Sp02 88% and lethargic.   - Stat ABG and CTA ordered. ABG with respiratory alkalosis  - CTA with worsening consolidations, restarted BSabx for now, and consulted pulmonary for therapy guidance considering her ILD and   - Bronchoscopy by pulm due to hx of , however patient/daughter declined  - Patient was attempted on CPAP overnight for concerns of underlying FREEDOM, and patient tolerated well with resultant good sleep as per daughter/patient. However she refused this overnight  - 3/4 AM hypoxic around mild low 80s, placed on HFNC, CXR and ABG reviewed, discussed with pulmonary. Increased home steroids, given one dose 80mg IV steroids once as per pulmonary and one dose 40mg IV lasix with good results  - Continue increased prednisone for now as per pulmonary, will need  to taper after continuing this for few weeks  - Bactrium as per pulmonary for PCP ppx (>20mg steroids daily)  - De-escalate abx to total 7 days of cefepime (last dose 3/9 - today)  - Restart MMF once off antibiotics   - When on MMF, will decrease to prednisone dose as per pulmonary   - Duonebs scheduled and supplemental oxygen PRN  - IS provided  - Plan to d/c to SNF after cepefime treatment, tuesday  - Will need pulmonary fu on discharge     Acute cystitis - resolved  - symptoms concerning for UTI with altered mentation  - UA positive for bacteria and WBC, leuk and nitrates  - given fluids and rocephin  - urine cx with e coli, changed ceftriaxone to keflex - finished tx  - bcx not taken in ED, ordered, NGTD   - afebrile and HDS    Hx of vascular dementia   Acute metabolic encephalopathy  - metabolic secondary to above  - she continued to have intermittent confusion at night, and early AM suspecting from underlying age/dementia  - Patient is on valium, gabapentin, and baclofen, likely contributed to this  - d/c home valium, decrease gabapentin dose, continue prn baclofen  - ABG with no hypercapnia     Generalized weakness  Multiple falls at home  - secondary to above issues  - PT OT rec SNF  - CM SW updated, they have sent referrals    HFrEF  - last TTE in 8/2019 with 40% EF  - appears compensated at this time  - repeat TTE with EF 55% and grade 1 DD, normal CVP    HTN  - hold home amlodipine for now    RA  - hold cellcept for now due to infection  - continue home plaquenil and steroids  - resume cellcept once off abx    HLD  - continue statin    Leukocytosis  - suspected reactive to above and from steroid use    Long QT interval   - last qtC 311 on 2/27/2020    Hypomagnesemia - resolved  - replete and recheck     Hypokalemia - resolved   - replete and recheck       Prophylaxis- lovenox  Code Status- Full Code   Discharge plan and follow up - d/c to SNF as per PT OT once medically stable    Gerber Shaw,  MD  [facility] Medicine Staff  Pager [phone]

## 2020-03-10 ENCOUNTER — PATIENT OUTREACH (OUTPATIENT)
Dept: ADMINISTRATIVE | Facility: OTHER | Age: 85
End: 2020-03-10

## 2020-03-10 VITALS
HEART RATE: 85 BPM | DIASTOLIC BLOOD PRESSURE: 59 MMHG | TEMPERATURE: 98 F | SYSTOLIC BLOOD PRESSURE: 128 MMHG | HEIGHT: 64 IN | WEIGHT: 186 LBS | BODY MASS INDEX: 31.76 KG/M2 | RESPIRATION RATE: 18 BRPM | OXYGEN SATURATION: 96 %

## 2020-03-10 LAB
ANION GAP SERPL CALC-SCNC: 9 MMOL/L (ref 8–16)
ANISOCYTOSIS BLD QL SMEAR: SLIGHT
BASO STIPL BLD QL SMEAR: ABNORMAL
BASOPHILS # BLD AUTO: 0.02 K/UL (ref 0–0.2)
BASOPHILS NFR BLD: 0.1 % (ref 0–1.9)
BUN SERPL-MCNC: 31 MG/DL (ref 8–23)
BURR CELLS BLD QL SMEAR: ABNORMAL
CALCIUM SERPL-MCNC: 8.6 MG/DL (ref 8.7–10.5)
CHLORIDE SERPL-SCNC: 104 MMOL/L (ref 95–110)
CO2 SERPL-SCNC: 26 MMOL/L (ref 23–29)
CREAT SERPL-MCNC: 1.2 MG/DL (ref 0.5–1.4)
DIFFERENTIAL METHOD: ABNORMAL
EOSINOPHIL # BLD AUTO: 0.2 K/UL (ref 0–0.5)
EOSINOPHIL NFR BLD: 1 % (ref 0–8)
ERYTHROCYTE [DISTWIDTH] IN BLOOD BY AUTOMATED COUNT: 16.4 % (ref 11.5–14.5)
EST. GFR  (AFRICAN AMERICAN): 48.6 ML/MIN/1.73 M^2
EST. GFR  (NON AFRICAN AMERICAN): 42.2 ML/MIN/1.73 M^2
GIANT PLATELETS BLD QL SMEAR: PRESENT
GLUCOSE SERPL-MCNC: 65 MG/DL (ref 70–110)
HCT VFR BLD AUTO: 35.1 % (ref 37–48.5)
HGB BLD-MCNC: 10.5 G/DL (ref 12–16)
IMM GRANULOCYTES # BLD AUTO: 0.32 K/UL (ref 0–0.04)
IMM GRANULOCYTES NFR BLD AUTO: 1.8 % (ref 0–0.5)
LYMPHOCYTES # BLD AUTO: 2.5 K/UL (ref 1–4.8)
LYMPHOCYTES NFR BLD: 13.9 % (ref 18–48)
MAGNESIUM SERPL-MCNC: 2 MG/DL (ref 1.6–2.6)
MCH RBC QN AUTO: 25.9 PG (ref 27–31)
MCHC RBC AUTO-ENTMCNC: 29.9 G/DL (ref 32–36)
MCV RBC AUTO: 87 FL (ref 82–98)
MONOCYTES # BLD AUTO: 2.2 K/UL (ref 0.3–1)
MONOCYTES NFR BLD: 12.2 % (ref 4–15)
NEUTROPHILS # BLD AUTO: 12.6 K/UL (ref 1.8–7.7)
NEUTROPHILS NFR BLD: 71 % (ref 38–73)
NRBC BLD-RTO: 0 /100 WBC
OVALOCYTES BLD QL SMEAR: ABNORMAL
PHOSPHATE SERPL-MCNC: 2.6 MG/DL (ref 2.7–4.5)
PLATELET # BLD AUTO: 456 K/UL (ref 150–350)
PLATELET BLD QL SMEAR: ABNORMAL
PMV BLD AUTO: 10 FL (ref 9.2–12.9)
POIKILOCYTOSIS BLD QL SMEAR: SLIGHT
POLYCHROMASIA BLD QL SMEAR: ABNORMAL
POTASSIUM SERPL-SCNC: 4.2 MMOL/L (ref 3.5–5.1)
RBC # BLD AUTO: 4.06 M/UL (ref 4–5.4)
SCHISTOCYTES BLD QL SMEAR: ABNORMAL
SCHISTOCYTES BLD QL SMEAR: PRESENT
SMUDGE CELLS BLD QL SMEAR: PRESENT
SODIUM SERPL-SCNC: 139 MMOL/L (ref 136–145)
WBC # BLD AUTO: 17.73 K/UL (ref 3.9–12.7)

## 2020-03-10 PROCEDURE — 25000242 PHARM REV CODE 250 ALT 637 W/ HCPCS: Performed by: INTERNAL MEDICINE

## 2020-03-10 PROCEDURE — 99239 HOSP IP/OBS DSCHRG MGMT >30: CPT | Mod: ,,, | Performed by: INTERNAL MEDICINE

## 2020-03-10 PROCEDURE — 25000003 PHARM REV CODE 250: Performed by: INTERNAL MEDICINE

## 2020-03-10 PROCEDURE — 63600175 PHARM REV CODE 636 W HCPCS: Performed by: INTERNAL MEDICINE

## 2020-03-10 PROCEDURE — 97116 GAIT TRAINING THERAPY: CPT

## 2020-03-10 PROCEDURE — 80048 BASIC METABOLIC PNL TOTAL CA: CPT

## 2020-03-10 PROCEDURE — 99239 PR HOSPITAL DISCHARGE DAY,>30 MIN: ICD-10-PCS | Mod: ,,, | Performed by: INTERNAL MEDICINE

## 2020-03-10 PROCEDURE — 94760 N-INVAS EAR/PLS OXIMETRY 1: CPT

## 2020-03-10 PROCEDURE — 84100 ASSAY OF PHOSPHORUS: CPT

## 2020-03-10 PROCEDURE — 94761 N-INVAS EAR/PLS OXIMETRY MLT: CPT

## 2020-03-10 PROCEDURE — 85025 COMPLETE CBC W/AUTO DIFF WBC: CPT

## 2020-03-10 PROCEDURE — 94640 AIRWAY INHALATION TREATMENT: CPT

## 2020-03-10 PROCEDURE — 36415 COLL VENOUS BLD VENIPUNCTURE: CPT

## 2020-03-10 PROCEDURE — 83735 ASSAY OF MAGNESIUM: CPT

## 2020-03-10 PROCEDURE — 97530 THERAPEUTIC ACTIVITIES: CPT

## 2020-03-10 RX ORDER — ERGOCALCIFEROL 1.25 MG/1
50000 CAPSULE ORAL
Qty: 8 CAPSULE
Start: 2020-03-16 | End: 2020-04-15 | Stop reason: SDUPTHER

## 2020-03-10 RX ORDER — TALC
6 POWDER (GRAM) TOPICAL NIGHTLY PRN
Refills: 0 | COMMUNITY
Start: 2020-03-10 | End: 2022-06-10

## 2020-03-10 RX ORDER — ACETAMINOPHEN 325 MG/1
650 TABLET ORAL EVERY 6 HOURS PRN
Refills: 0 | COMMUNITY
Start: 2020-03-10 | End: 2021-05-20

## 2020-03-10 RX ORDER — POLYETHYLENE GLYCOL 3350 17 G/17G
17 POWDER, FOR SOLUTION ORAL DAILY PRN
Refills: 0
Start: 2020-03-10 | End: 2022-10-12

## 2020-03-10 RX ORDER — PREDNISONE 10 MG/1
TABLET ORAL
Status: ON HOLD
Start: 2020-03-10 | End: 2020-04-14 | Stop reason: SDUPTHER

## 2020-03-10 RX ORDER — IPRATROPIUM BROMIDE AND ALBUTEROL SULFATE 2.5; .5 MG/3ML; MG/3ML
3 SOLUTION RESPIRATORY (INHALATION) EVERY 6 HOURS PRN
Qty: 1 BOX | Refills: 0
Start: 2020-03-10 | End: 2020-03-27

## 2020-03-10 RX ORDER — MYCOPHENOLATE MOFETIL 250 MG/1
500 CAPSULE ORAL 2 TIMES DAILY
Status: DISCONTINUED | OUTPATIENT
Start: 2020-03-10 | End: 2020-03-10 | Stop reason: HOSPADM

## 2020-03-10 RX ORDER — SULFAMETHOXAZOLE AND TRIMETHOPRIM 800; 160 MG/1; MG/1
1 TABLET ORAL
Start: 2020-03-11 | End: 2020-04-15 | Stop reason: SDUPTHER

## 2020-03-10 RX ORDER — FUROSEMIDE 40 MG/1
40 TABLET ORAL DAILY
Status: ON HOLD
Start: 2020-03-10 | End: 2020-04-14 | Stop reason: SDUPTHER

## 2020-03-10 RX ORDER — DICYCLOMINE HYDROCHLORIDE 10 MG/1
10 CAPSULE ORAL 4 TIMES DAILY PRN
Status: ON HOLD
Start: 2020-03-10 | End: 2020-04-14 | Stop reason: HOSPADM

## 2020-03-10 RX ADMIN — IPRATROPIUM BROMIDE AND ALBUTEROL SULFATE 3 ML: .5; 3 SOLUTION RESPIRATORY (INHALATION) at 12:03

## 2020-03-10 RX ADMIN — MYCOPHENOLATE MOFETIL 500 MG: 250 CAPSULE ORAL at 08:03

## 2020-03-10 RX ADMIN — HYDROXYCHLOROQUINE SULFATE 200 MG: 200 TABLET, FILM COATED ORAL at 08:03

## 2020-03-10 RX ADMIN — ATORVASTATIN CALCIUM 40 MG: 20 TABLET, FILM COATED ORAL at 08:03

## 2020-03-10 RX ADMIN — ASPIRIN 81 MG: 81 TABLET, COATED ORAL at 08:03

## 2020-03-10 RX ADMIN — CEFEPIME 2 G: 2 INJECTION, POWDER, FOR SOLUTION INTRAVENOUS at 12:03

## 2020-03-10 RX ADMIN — IPRATROPIUM BROMIDE AND ALBUTEROL SULFATE 3 ML: .5; 3 SOLUTION RESPIRATORY (INHALATION) at 04:03

## 2020-03-10 RX ADMIN — TRAMADOL HYDROCHLORIDE 50 MG: 50 TABLET, FILM COATED ORAL at 08:03

## 2020-03-10 RX ADMIN — IPRATROPIUM BROMIDE AND ALBUTEROL SULFATE 3 ML: .5; 3 SOLUTION RESPIRATORY (INHALATION) at 08:03

## 2020-03-10 RX ADMIN — ACETAMINOPHEN 650 MG: 325 TABLET ORAL at 06:03

## 2020-03-10 RX ADMIN — PANTOPRAZOLE SODIUM 40 MG: 40 TABLET, DELAYED RELEASE ORAL at 08:03

## 2020-03-10 RX ADMIN — PREDNISONE 60 MG: 20 TABLET ORAL at 08:03

## 2020-03-10 RX ADMIN — FUROSEMIDE 40 MG: 40 TABLET ORAL at 08:03

## 2020-03-10 NOTE — PLAN OF CARE
[facility]     Department of Hospital Medicine     [address] [place] [address]     [phone] after hours  [phone] fax       REHAB ORDERS    03/10/2020    Admit to Rehab      Diagnoses:  Active Hospital Problems    Diagnosis  POA    *Acute respiratory failure with hypoxia [J96.01]  No    Acute cystitis without hematuria [N30.00]  Yes    Arthritis [M19.90]  Yes    Hyperlipidemia [E78.5]  Yes    Multinodular goiter [E04.2]  Yes    AF (paroxysmal atrial fibrillation) [I48.0]  Yes    Generalized weakness [R53.1]  Yes    Hypertension, essential [I10]  Yes    Iron deficiency anemia secondary to inadequate dietary iron intake [D50.8]  Yes      Resolved Hospital Problems   No resolved problems to display.       Patient is homebound due to:  Acute respiratory failure with hypoxia    Allergies:Review of patient's allergies indicates:  No Known Allergies    Vitals: Every shift     Diet: Cardiac diet with 1500 cc fluid restriction    Supplement:  1 can every three times a day with meals                         Type:  Ensure    Acitivities: As per PT OT    LABS:  CBC, CMP, Mg and Phos in one week    Nursing Precautions:    - Aspiration precautions:             - Total assistance with meals            -  Upright 90 degrees befor during and after meals             -  Suction at bedside          - Fall precautions per nursing home protocol   - Decubitus precautions:        -  for positioning   - Pressure reducing foam mattress   - Turn patient every two hours. Use wedge pillows to anchor patient  - Patient gets pleasantly delirium at night and when she wakes up, please redirect. No agitation in the hospital. Very pleasant lady.  - Takes her about to hr to wake up and be alert  - Need PCP and pulmonary follow up in one week  - She is on 2L continuous oxygen     CONSULTS:   Physical Therapy to evaluate and treat     Occupational Therapy to evaluate and  treat     Speech Therapy  to evaluate and treat     Nutrition to evaluate and recommend diet    MISCELLANEOUS CARE:     Routine Skin for Bedridden Patients:  Apply moisture barrier cream to all    skin folds and wet areas in perineal area daily and after baths and                           all bowel movements.      Medications: Discontinue all previous medication orders, if any. See new list below.     Dr. Jose J Lux   Home Medication Instructions JAZZMINE:[id number]    Printed on:03/10/20 2320   Medication Information                      acetaminophen (TYLENOL) 325 MG tablet  Take 2 tablets (650 mg total) by mouth every 6 (six) hours as needed for Pain or Temperature greater than (101).             albuterol-ipratropium (DUO-NEB) 2.5 mg-0.5 mg/3 mL nebulizer solution  Take 3 mLs by nebulization every 6 (six) hours as needed for Wheezing or Shortness of Breath. Rescue             aspirin (ECOTRIN) 81 MG EC tablet  Take 81 mg by mouth once daily.             atorvastatin (LIPITOR) 40 MG tablet  Take 1 tablet (40 mg total) by mouth once daily.             baclofen (LIORESAL) 10 MG tablet  Take 10 mg by mouth 3 (three) times daily as needed.             dicyclomine (BENTYL) 10 MG capsule  Take 1 capsule (10 mg total) by mouth 4 (four) times daily as needed (abdominal pain).             ergocalciferol (ERGOCALCIFEROL) 50,000 unit Cap  Take 1 capsule (50,000 Units total) by mouth every 7 days. For total 8 weeks             furosemide (LASIX) 40 MG tablet  Take 1 tablet (40 mg total) by mouth once daily. TAKE 1 TABLET(40 MG) BY MOUTH TWICE DAILY             hydroxychloroquine (PLAQUENIL) 200 mg tablet  Take 1 tablet (200 mg total) by mouth 2 (two) times daily.             magnesium oxide (MAG-OX) 400 mg (241.3 mg magnesium) tablet  Take 400 mg by mouth once daily.             melatonin (MELATIN) 3 mg tablet  Take 2 tablets (6 mg total) by mouth nightly as needed for Insomnia.             montelukast (SINGULAIR) 10  mg tablet  Take 1 tablet (10 mg total) by mouth every evening.             mycophenolate (CELLCEPT) 500 mg Tab  Take 1 tablet (500 mg total) by mouth 2 (two) times daily.             omeprazole (PRILOSEC) 20 MG capsule  Take 1 capsule (20 mg total) by mouth once daily.             polyethylene glycol (GLYCOLAX) 17 gram PwPk  Take 17 g by mouth daily as needed.             potassium chloride SA (K-DUR,KLOR-CON) 10 MEQ tablet  Take 2 tablets (20 mEq total) by mouth once daily.             predniSONE (DELTASONE) 10 MG tablet - taper dose  Currently on 60 mg, continue for one week, then taper 50 mg daily for 1 week, then 40 mg daily for 1 week, then 30 mg daily for 1 week, then 20 mg daily indefinitely until seen by lung doctor             sulfamethoxazole-trimethoprim 800-160mg (BACTRIM DS) 800-160 mg Tab  Take 1 tablet by mouth every Mon, Wed, Fri.             thiamine 100 MG tablet  Take 100 mg by mouth once daily.               2L continuous oxygen                              _________________________________  Gerber Shaw MD  03/10/2020

## 2020-03-10 NOTE — PLAN OF CARE
Patient to discharge to [facility].  Nurse to call report to [phone],  Room 201.  Transportation requested for 1:45 which is not a guaranteed arrival time.

## 2020-03-10 NOTE — PLAN OF CARE
"  Problem: Fall Injury Risk  Goal: Absence of Fall and Fall-Related Injury  Outcome: Ongoing, Progressing     POC reviewed with patient this shift.  A/O x4.  Respirations unlabored.  Skin w/d.  Pt unable to void while in bed but voids spontaneously when assisted to bedside commode.  Pt did c/o neck pain this AM, stating she "slept wrong."  PRN Tylenol given as requested.  Tolerated meds whole with water without difficulty.  VSS.  See flowsheet for full assessment.  WCTM.  "

## 2020-03-10 NOTE — PLAN OF CARE
Problem: Physical Therapy Goal  Goal: Physical Therapy Goal  Description  Goals to be met by: 3/13/2020     Patient will increase functional independence with mobility by performin. Supine to sit with Contact Guard Assistance  2. Sit to supine with Contact Guard Assistance  3. Sit to stand transfer with Minimal Assistance using Rolling Walker or LRAD-met 3/6/20  Revised goal: sit to stand with Rw with CGA-not met  4. Bed to chair transfer with Minimal Assistance using Rolling Walker or LRAD -Met 3/9/20  5. Gait  x 25 feet with Contact Guard Assistance using Rolling Walker. -partially met 3/6/20  Revised goal: gait 45ft with RW with CGA-not met  6. Sitting at edge of bed x10 minutes with Stand-by Assistance  7. Lower extremity exercise program x20 reps per handout, with independence       Outcome: Ongoing, Progressing   Pt's goals remain appropriate and pt will continue to benefit from skilled PT services to work towards improved functional mobility including: bed mobility, transfers, and gait.   Alyssa Enriquez, PT  3/10/2020

## 2020-03-10 NOTE — PLAN OF CARE
03/10/20 1241   Post-Acute Status   Post-Acute Authorization Placement   Post-Acute Placement Status Set-up Complete       Pt going to [facility].  SW in contact with CM and Medical staff. Will continue to follow and offer support as needed.     Charlie Espinosa LMSW  [facility]   Ext. [phone]

## 2020-03-10 NOTE — PLAN OF CARE
Patient to be discharged to [facility].  Care deferred to [facility].  Patient to be transported via wheelchair van.  PCP appointment made and Pulmonary to call with appointment date and time.  Central scheduling sent message to Pulmonary.    Future Appointments   Date Time Provider Department Center   3/11/2020  8:00 AM Baldomero Giang MD [facility] NEURO [address]   3/18/2020  9:20 AM Demond Wolf MD [facility] CARDIO [address]   3/23/2020  1:40 PM Bhargav Lee MD [facility] IM [address] PCW   3/27/2020  9:40 AM Bhargav Lee MD [facility] IM [address] PCW   4/3/2020  2:00 PM LAB, [facility]DRAW [facility]   4/6/2020  1:00 PM Lonnie Rouse MD [facility] RHEUM [address]   4/20/2020  1:00 PM Bhargav Lee MD [facility] IM [address] PCW   5/1/2020  1:30 PM [facility]   10/28/2020  9:00 AM [facility] USOP1 [facility] USOUNDO [facility]   11/9/2020  1:30 PM Rashad Monroy MD [facility] ENDO8 [facility]        03/10/20 1253   Final Note   Assessment Type Final Discharge Note   Anticipated Discharge Disposition Rehab   Hospital Follow Up  Appt(s) scheduled?   (0 follow up appointments requested to be made at discharge)   Discharge plans and expectations educations in teach back method with documentation complete? Yes

## 2020-03-10 NOTE — PT/OT/SLP PROGRESS
"Physical Therapy Treatment    Patient Name:  Selma Lux   MRN:  [id number]    Recommendations:     Discharge Recommendations:  rehabilitation facility   Discharge Equipment Recommendations: none   Barriers to discharge: Inaccessible home and Decreased caregiver support lives with daughter who works, 5 [address]    Assessment:     Selma Lux is a 84 y.o. female admitted with a medical diagnosis of Acute respiratory failure with hypoxia.  She presents with the following impairments/functional limitations:  weakness, gait instability, impaired balance, impaired endurance, impaired functional mobilty, decreased safety awareness, impaired cardiopulmonary response to activity . Pt is unsafe with functional mobility at this time due to pt requires minimal assist for bed mobility, minimal assist for transfers, and minimal assist for gait . Pt is SOB after gait trial. Pt appears upset, tearful at times during treatment because she feels she is not remembering things like she should. PT asked her orientation questions and pt answered correctly. Pt given support and encouragement from PT .    Rehab Prognosis: Good; patient would benefit from acute skilled PT services to address these deficits and reach maximum level of function.    Recent Surgery: * No surgery found *      Plan:     During this hospitalization, patient to be seen 4 x/week to address the identified rehab impairments via gait training, therapeutic activities, therapeutic exercises, neuromuscular re-education and progress toward the following goals:    · Plan of Care Expires:  03/28/20    Subjective   "I didn't sleep well last night, I was up throughout the night"  Pain/Comfort:  · Pain Rating 1: 0/10  · Pain Rating Post-Intervention 1: 0/10      Objective:     Communicated with nurse prior to session.  Patient found supine with PureWick, telemetry upon PT entry to room.     General Precautions: Standard, fall   Orthopedic Precautions:N/A   Braces: N/A "     Functional Mobility:  · Bed Mobility:     · Supine to Sit: minimum assistance  · Transfers:     · Sit to Stand:  minimum assistance with rolling walker  · Gait: 5 steps x 2 trials to/from bedside commode then 40ft with RW with minimal assist. Pt performed gait with flexed trunk, decreased LYNDSEY, decreased step length, and at slow pace. pt noted to be SOB at end of gait trial, ox sat 90% in sitting on room air then increased to 94% after ~ 2 min rest.    AM-PAC 6 CLICK MOBILITY  Turning over in bed (including adjusting bedclothes, sheets and blankets)?: 3  Sitting down on and standing up from a chair with arms (e.g., wheelchair, bedside commode, etc.): 3  Moving from lying on back to sitting on the side of the bed?: 3  Moving to and from a bed to a chair (including a wheelchair)?: 3  Need to walk in hospital room?: 3  Climbing 3-5 steps with a railing?: 2  Basic Mobility Total Score: 17     Therapeutic Activities and Exercises:   pt urinated on the bedside commode and was able to clean herself with set up assist and CGA. Pt sat on the EOB ~ 12 min with SBA due to pt tearful and required encouragement to walk in the halls.     Patient left sitting EOB with nephew present and providing supervision with all lines intact, call button in reach, nurse notified and AVS camera present..    GOALS:   Multidisciplinary Problems     Physical Therapy Goals        Problem: Physical Therapy Goal    Goal Priority Disciplines Outcome Goal Variances Interventions   Physical Therapy Goal     PT, PT/OT Ongoing, Progressing     Description:  Goals to be met by: 3/13/2020     Patient will increase functional independence with mobility by performin. Supine to sit with Contact Guard Assistance  2. Sit to supine with Contact Guard Assistance  3. Sit to stand transfer with Minimal Assistance using Rolling Walker or LRAD-met 3/6/20  Revised goal: sit to stand with Rw with CGA-not met  4. Bed to chair transfer with Minimal Assistance  using Rolling Walker or LRAD -Met 3/9/20  5. Gait  x 25 feet with Contact Guard Assistance using Rolling Walker. -partially met 3/6/20  Revised goal: gait 45ft with RW with CGA-not met  6. Sitting at edge of bed x10 minutes with Stand-by Assistance  7. Lower extremity exercise program x20 reps per handout, with independence                        Time Tracking:     PT Received On: 03/10/20  PT Start Time: 1258     PT Stop Time: 1337  PT Total Time (min): 39 min     Billable Minutes: Gait Training 16 and Therapeutic Activity 23    Treatment Type: Treatment  PT/PTA: PT     PTA Visit Number: 0     Alyssa Enriquez, PT  03/10/2020

## 2020-03-11 NOTE — DISCHARGE SUMMARY
[facility]  Discharge Summary  [facility] Medicine    Patient Name: Selma Lux  YOB: 1935    Admit Date: 2/27/2020    Discharge Date and Time: 3/10/2020  3:59 PM    Discharge Attending Physician: Gerber Shaw MD     Team: [facility] HOSP MED G    Reason for Admission:   Chief Complaint   Patient presents with    Multiple Complaints     hip pain from back, weakness walking and needing to sit down, arms and legs weak,        Active Hospital Problems    Diagnosis  POA    *Acute respiratory failure with hypoxia [J96.01]  No    Acute cystitis without hematuria [N30.00]  Yes    Arthritis [M19.90]  Yes    Hyperlipidemia [E78.5]  Yes    Multinodular goiter [E04.2]  Yes    AF (paroxysmal atrial fibrillation) [I48.0]  Yes    Generalized weakness [R53.1]  Yes    Hypertension, essential [I10]  Yes    Iron deficiency anemia secondary to inadequate dietary iron intake [D50.8]  Yes      Resolved Hospital Problems   No resolved problems to display.       HPI:   Selma Lux is a 84 y.o. female with hx of HFrEF, COPD, Cryptogenic organizing pneumonia on chronic prednisone, RA on plaquenil and cellcept, HTN, HPLD, TIA, vascular dementia, pulmonary HTN secondary to ILD, Long QT interval presenting for generalized weakness and intermittent mechanical falls for the past 2 weeks piror to admisison. 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 She states that her PT was commenting on how much weaker she had gotten as well. The patient does complain of not being able to do the things she would like to anymore and that she was probably going to see a psychiatrist soon. Patient is on valium, gabapentin, and baclofen. Per patients daughter she only takes valium and higher dose of gabapentin at night for sleep. Baclofen is only taken once a day now, per  her PCP the last time she saw her in the clinic. In ED, CXR with infiltrates and UA concerning for UTI. Medicine called for admission.     Hospital Course:   Admitted to hospital medicine for acute metabolic encephalopathy suspected secondary to UTI and CAP. Obtained urine and started on BSabx. Unfortunately for blood cultures not obtained, ordered, NGTD. Urine with E Coli. Hypoglycemia noted, suspecting from infection and poor po intake. Since admission patient has gradually improved with supportive care. Changed IV abx to PO. PT OT rec SNF, plan was discharge to SNF. Unfortunately 3/2 morning patient was found to be hypoxic Sp02 88% and lethargic. Stat ABG and CTA ordered. No piror hx of FREEDOM. Respiratory panel negative. ABG with respiratory alkalosis. CTA with no PE but noting worsening consolidations, will consult pulmonary. Restarted BSabx. Pulmonary offered bronchoscopy due to hx of , however patient/daughter declined at this time. Plan to continue BSabx for now, and now increased home steroids as per pulmonary. De-escalated abx to cefepime for total 7 days as per pulmonary. Plan to continue high dose steroids for a few weeks before beginning to taper off. Started on bactrim MWF while she remains of >20mg prednisone per day. PT OT continue to work with patient. Patient finished her 7 day course of cefepime. Resumed her cellcept. Stable for discharge to SNF. She will need to fu with PCP and pulmonary on discharge. Consider OP sleep study, will defer to pulmonary.     Principal Problem: Acute respiratory failure with hypoxia    Other Problems Addressed:  Acute hypoxic respiratory failure  Community acquired pneumonia  Cryptogenic organizing pneumonia on chronic prednisone  Pulmonary HTN secondary to ILD  Acute cystitis - resolved  Hx of vascular dementia   Acute metabolic encephalopathy  Generalized weakness  Multiple falls at home  HFrEF  HTN  RA  HLD  Leukocytosis  Long QT interval   Hypomagnesemia -  "resolved  Hypokalemia - resolved     Procedures Performed: * No surgery found *    Special Care, Treatment, and Services Provided: none    Consults: pulmonary     Significant Diagnostic Studies:  No results found for: EF  Hemoglobin A1C   Date Value Ref Range Status   2019 5.6 4.0 - 5.6 % Final     Comment:     ADA Screening Guidelines:  5.7-6.4%  Consistent with prediabetes  >or=6.5%  Consistent with diabetes  High levels of fetal hemoglobin interfere with the HbA1C  assay. Heterozygous hemoglobin variants (HbS, HgC, etc)do  not significantly interfere with this assay.   However, presence of multiple variants may affect accuracy.     2019 5.7 (H) 4.0 - 5.6 % Final     Comment:     ADA Screening Guidelines:  5.7-6.4%  Consistent with prediabetes  >or=6.5%  Consistent with diabetes  High levels of fetal hemoglobin interfere with the HbA1C  assay. Heterozygous hemoglobin variants (HbS, HgC, etc)do  not significantly interfere with this assay.   However, presence of multiple variants may affect accuracy.       All diagnostic work up reviewed     Final Diagnoses: Same as principal problem.    Discharged Condition: stable  Face to face services were provided on 3/11/2020   Time Spent:  I spent > 30 minutes on the discharge, which included reviewing hospital course with patient/family, reviewing discharge medications, and arranging follow-up care.  Physical Exam on 3/11/2020:  BP (!) 128/59 (Patient Position: Lying)   Pulse 85   Temp 98 °F (36.7 °C) (Oral)   Resp 18   Ht 5' 4" (1.626 m)   Wt 84.4 kg (186 lb)   LMP  (LMP Unknown)   SpO2 96%   Breastfeeding? No   BMI 31.93 kg/m²     GEN: AA female  in no acute distress. Resting in bed. Cooperative. Calm.  HEENT: NCAT. PERRL. EOMI. Conjunctivae/corneas clear, sclera Anicteric.  CVS: RRR. Normal s1 s2 +murmur, no click, rub or gallop  LUNL NC saturating > 90%. Normal respiratory effort. No wheezes. Stable bibasilar crackles L>R.  ABD: Normoactive BS, " soft, NT, distended, no masses or organomegaly.  EXT: no LE edema. No cyanosis. Full ROM.  SKIN: color, texture, turgor normal. No rashes or lesions  NEURO: Alert, oriented x 3, grossly normal, globally weak    Disposition: Skilled Nursing Facility    Follow Up Instructions:   Follow-up Information     Bhargav Hirsch MD.    Specialties:  Family Medicine, Sports Medicine  Why:  Outpatient Services  Contact information:  [address]  [facility] [address]  [phone]             Pulmonary. Schedule an appointment as soon as possible for a visit in 1 week.    Why:  Post hospital follow up                Future Appointments   Date Time Provider Department Center   3/18/2020  9:20 AM Demond Wolf MD [facility] CARDIO [address]   3/23/2020  1:40 PM Bhargav Hirsch MD [facility] IM [address] PCW   3/27/2020  9:40 AM Bhargav Hirsch MD [facility] IM [address] PCW   4/3/2020  2:00 PM LAB, [facility] LABDRAW [facility]   4/6/2020  1:00 PM Lonnie Rouse MD [facility] RHEUM [address]   4/20/2020  1:00 PM Bhargav Hirsch MD [facility] IM [address] PCW   5/1/2020  1:30 PM [facility]   10/28/2020  9:00 AM [facility] USOP1 [facility] USOUNDO [facility]   11/9/2020  1:30 PM Rashad Monroy MD [facility] ENDO8 [facility]       Medications:    Discharge Medication List as of 3/10/2020  1:42 PM      START taking these medications    Details   albuterol-ipratropium (DUO-NEB) 2.5 mg-0.5 mg/3 mL nebulizer solution Take 3 mLs by nebulization every 6 (six) hours as needed for Wheezing or Shortness of Breath. Rescue, Starting Tue 3/10/2020, Until Wed 3/10/2021, No Print      dicyclomine (BENTYL) 10 MG capsule Take 1 capsule (10 mg total) by mouth 4 (four) times daily as needed (abdominal pain)., Starting Tue 3/10/2020, Until Thu 4/9/2020, No Print      ergocalciferol (ERGOCALCIFEROL) 50,000 unit Cap Take 1 capsule (50,000 Units total) by mouth every 7 days. For total 8 weeks, Starting Mon 3/16/2020, No Print       melatonin (MELATIN) 3 mg tablet Take 2 tablets (6 mg total) by mouth nightly as needed for Insomnia., Starting Tue 3/10/2020, OTC      polyethylene glycol (GLYCOLAX) 17 gram PwPk Take 17 g by mouth daily as needed., Starting Tue 3/10/2020, No Print      sulfamethoxazole-trimethoprim 800-160mg (BACTRIM DS) 800-160 mg Tab Take 1 tablet by mouth every Mon, Wed, Fri., Starting Wed 3/11/2020, No Print         CONTINUE these medications which have CHANGED    Details   acetaminophen (TYLENOL) 325 MG tablet Take 2 tablets (650 mg total) by mouth every 6 (six) hours as needed for Pain or Temperature greater than (101)., Starting Tue 3/10/2020, OTC      furosemide (LASIX) 40 MG tablet Take 1 tablet (40 mg total) by mouth once daily. TAKE 1 TABLET(40 MG) BY MOUTH TWICE DAILY, Starting Tue 3/10/2020, No Print      predniSONE (DELTASONE) 10 MG tablet Currently on 60 mg, continue for one week, then 50 mg daily for 1 week, then 40 mg daily for 1 week, then 30 mg daily for 1 week, then 20 mg daily indefinitely until seen by lung doctor, No Print         CONTINUE these medications which have NOT CHANGED    Details   aspirin (ECOTRIN) 81 MG EC tablet Take 81 mg by mouth once daily., Historical Med      atorvastatin (LIPITOR) 40 MG tablet Take 1 tablet (40 mg total) by mouth once daily., Starting Mon 2/17/2020, Normal      baclofen (LIORESAL) 10 MG tablet Take 10 mg by mouth 3 (three) times daily as needed., Historical Med      magnesium oxide (MAG-OX) 400 mg (241.3 mg magnesium) tablet Take 400 mg by mouth once daily., Historical Med      montelukast (SINGULAIR) 10 mg tablet Take 1 tablet (10 mg total) by mouth every evening., Starting Tue 1/21/2020, Normal      mycophenolate (CELLCEPT) 500 mg Tab Take 1 tablet (500 mg total) by mouth 2 (two) times daily., Starting Wed 11/13/2019, Until Fri 2/28/2020, Normal      omeprazole (PRILOSEC) 20 MG capsule Take 1 capsule (20 mg total) by mouth once daily., Starting Mon 1/13/2020, Normal       potassium chloride SA (K-DUR,KLOR-CON) 10 MEQ tablet Take 2 tablets (20 mEq total) by mouth once daily., Starting Mon 1/13/2020, Normal      thiamine 100 MG tablet Take 100 mg by mouth once daily., Historical Med      hydroxychloroquine (PLAQUENIL) 200 mg tablet Take 1 tablet (200 mg total) by mouth 2 (two) times daily., Starting Fri 11/22/2019, Until Thu 2/20/2020, Normal         STOP taking these medications       albuterol (PROVENTIL/VENTOLIN HFA) 90 mcg/actuation inhaler Comments:   Reason for Stopping:         amLODIPine (NORVASC) 10 MG tablet Comments:   Reason for Stopping:         diazePAM (VALIUM) 5 MG tablet Comments:   Reason for Stopping:         gabapentin (NEURONTIN) 100 MG capsule Comments:   Reason for Stopping:         gabapentin (NEURONTIN) 300 MG capsule Comments:   Reason for Stopping:         traMADol (ULTRAM) 50 mg tablet Comments:   Reason for Stopping:               Discharge Instructions:  Patient transferred to SNF. She will need to fu with PCP and pulmonary on discharge.    Gerber Shwa MD  Department of Hospital Medicine

## 2020-03-13 ENCOUNTER — PATIENT OUTREACH (OUTPATIENT)
Dept: ADMINISTRATIVE | Facility: HOSPITAL | Age: 85
End: 2020-03-13

## 2020-03-17 NOTE — PHYSICIAN QUERY
PT Name: Selma Lux  MR #: [id number]     Physician Query Form - Documentation Clarification      CDS: Dee Lamb RN, CCDS         Contact information :[phone]  [email or web address]     This form is a permanent document in the medical record.     Query Date: March 17, 2020    By submitting this query, we are merely seeking further clarification of documentation. Please utilize your independent clinical judgment when addressing the question(s) below.    The Medical Record reflects the following:    Clinical Findings Location in Medical Record       Acute hypoxic respiratory failure  - 3/2 morning patient was found to be hypoxic Sp02 88% and lethargic.   - Stat ABG and CTA ordered. ABG with respiratory alkalosis    - Duonebs scheduled and supplemental oxygen PRN  - IS provided  - Continue home prednisone for now    pH  7.490  pO2  69  POC Sat O2 96%    Treated for CAP based on CT w/ new areas of consolidation. Initially responded ceft/azithro, but oxygenation worsened prompting pulm evaluation.   #. Acute hypoxic respiratory failure- requiring 3-4L of O2 via NC and NIV overnight. Febrile w/ leukocytosis. Possibly 2/2 a resistant organism vs exacerbation of ILD. Started on BSAbx with some improvement clinically     Hospital medicine progress note 3/2/20            Hospital medicine progress note 3/2/20          ABG report 3/2/20        Pulmonology consult 3/3/10                                                                                Please clarify the diagnosis of  Acute hypoxic respiratory failure      Provider Use Only        [ X  ]  Acute hypoxic respiratory failure  ruled in and additional clinical support/ decision making indicators for the diagnosis include (specify):_________________________________    [    ] Acute hypoxic respiratory failure has been ruled out    [    ]  Acute hypoxic respiratory failure has been ruled out, other diagnosis ruled in (specify):___________    [  ]  Other clarification (specify): ______________    [  ] Clinically undetermined

## 2020-03-26 ENCOUNTER — TELEPHONE (OUTPATIENT)
Dept: RHEUMATOLOGY | Facility: CLINIC | Age: 85
End: 2020-03-26

## 2020-03-26 ENCOUNTER — TELEPHONE (OUTPATIENT)
Dept: INTERNAL MEDICINE | Facility: CLINIC | Age: 85
End: 2020-03-26

## 2020-03-26 NOTE — TELEPHONE ENCOUNTER
Pt daughter is requesting a order for [facility] skill nurse. They sent her one but its not thru [facility].     Send order to  [facility] @[phone]

## 2020-03-26 NOTE — TELEPHONE ENCOUNTER
Patient's daughter-Rosy Rosado-called to ask if mother could be transferred from  [facility] in [place] to [facility].  Patient's daughter states patient would like to go to [facility] and was informed there is a bed available.  Patient's daughter asking for referral to [facility].  Information given to Soni Rosado RN [facility] in internal medicine to forward info to patient's PCP-Dr. Lee-for referral request.

## 2020-03-26 NOTE — TELEPHONE ENCOUNTER
----- Message from Kira Lagos sent at 3/26/2020  4:17 PM CDT -----  Contact: Susu (daughter) [phone]  Susu state the patient missed a call from Dr. Lee office. Susu state the call is in reference to the patient appointment tomorrow. Please call and advise.

## 2020-03-27 ENCOUNTER — HOSPITAL ENCOUNTER (INPATIENT)
Facility: HOSPITAL | Age: 85
LOS: 19 days | Discharge: HOME HEALTH | DRG: 193 | End: 2020-04-15
Attending: INTERNAL MEDICINE | Admitting: INTERNAL MEDICINE
Payer: MEDICARE

## 2020-03-27 ENCOUNTER — TELEPHONE (OUTPATIENT)
Dept: INTERNAL MEDICINE | Facility: CLINIC | Age: 85
End: 2020-03-27

## 2020-03-27 DIAGNOSIS — M19.90 ARTHRITIS: ICD-10-CM

## 2020-03-27 DIAGNOSIS — I48.0 AF (PAROXYSMAL ATRIAL FIBRILLATION): ICD-10-CM

## 2020-03-27 DIAGNOSIS — G24.9 DYSTONIA: ICD-10-CM

## 2020-03-27 DIAGNOSIS — D25.0 SUBMUCOUS LEIOMYOMA OF UTERUS: ICD-10-CM

## 2020-03-27 DIAGNOSIS — F39 MOOD DISORDER: ICD-10-CM

## 2020-03-27 DIAGNOSIS — R94.31 LONG QT INTERVAL: ICD-10-CM

## 2020-03-27 DIAGNOSIS — D84.9 IMMUNOSUPPRESSED STATUS: ICD-10-CM

## 2020-03-27 DIAGNOSIS — H35.352 CYSTOID MACULAR EDEMA, LEFT EYE: ICD-10-CM

## 2020-03-27 DIAGNOSIS — J84.9 PULMONARY HYPERTENSION DUE TO INTERSTITIAL LUNG DISEASE: ICD-10-CM

## 2020-03-27 DIAGNOSIS — E66.09 CLASS 1 OBESITY DUE TO EXCESS CALORIES WITH SERIOUS COMORBIDITY IN ADULT, UNSPECIFIED BMI: ICD-10-CM

## 2020-03-27 DIAGNOSIS — Z86.73 HISTORY OF STROKE: ICD-10-CM

## 2020-03-27 DIAGNOSIS — N18.30 CHRONIC RENAL FAILURE SYNDROME, STAGE 3 (MODERATE): ICD-10-CM

## 2020-03-27 DIAGNOSIS — E04.2 MULTINODULAR GOITER: ICD-10-CM

## 2020-03-27 DIAGNOSIS — K27.9 PUD (PEPTIC ULCER DISEASE): ICD-10-CM

## 2020-03-27 DIAGNOSIS — R53.1 GENERALIZED WEAKNESS: ICD-10-CM

## 2020-03-27 DIAGNOSIS — R76.8 THYROID ANTIBODY POSITIVE: ICD-10-CM

## 2020-03-27 DIAGNOSIS — M05.79 SEROPOSITIVE RHEUMATOID ARTHRITIS OF MULTIPLE SITES: ICD-10-CM

## 2020-03-27 DIAGNOSIS — M16.12 PRIMARY OSTEOARTHRITIS OF LEFT HIP: ICD-10-CM

## 2020-03-27 DIAGNOSIS — R53.81 DEBILITATED: ICD-10-CM

## 2020-03-27 DIAGNOSIS — F01.50 VASCULAR DEMENTIA WITHOUT BEHAVIORAL DISTURBANCE: ICD-10-CM

## 2020-03-27 DIAGNOSIS — E78.5 HYPERLIPIDEMIA, UNSPECIFIED HYPERLIPIDEMIA TYPE: ICD-10-CM

## 2020-03-27 DIAGNOSIS — J84.116 CRYPTOGENIC ORGANIZING PNEUMONIA: ICD-10-CM

## 2020-03-27 DIAGNOSIS — I10 HYPERTENSION, ESSENTIAL: ICD-10-CM

## 2020-03-27 DIAGNOSIS — M47.816 LUMBAR SPONDYLOSIS: ICD-10-CM

## 2020-03-27 DIAGNOSIS — I70.0 AORTIC ATHEROSCLEROSIS: ICD-10-CM

## 2020-03-27 DIAGNOSIS — D69.6 THROMBOCYTOPENIA: ICD-10-CM

## 2020-03-27 DIAGNOSIS — R13.12 OROPHARYNGEAL DYSPHAGIA: ICD-10-CM

## 2020-03-27 DIAGNOSIS — J96.90 RESPIRATORY FAILURE: ICD-10-CM

## 2020-03-27 DIAGNOSIS — M16.4 POST-TRAUMATIC OSTEOARTHRITIS OF BOTH HIPS: ICD-10-CM

## 2020-03-27 DIAGNOSIS — Z86.73 HISTORY OF TRANSIENT ISCHEMIC ATTACK (TIA): ICD-10-CM

## 2020-03-27 DIAGNOSIS — H35.012 RETINAL MACROANEURYSM OF LEFT EYE: ICD-10-CM

## 2020-03-27 DIAGNOSIS — N30.00 ACUTE CYSTITIS WITHOUT HEMATURIA: ICD-10-CM

## 2020-03-27 DIAGNOSIS — I27.23 PULMONARY HYPERTENSION DUE TO INTERSTITIAL LUNG DISEASE: ICD-10-CM

## 2020-03-27 DIAGNOSIS — J96.01 ACUTE RESPIRATORY FAILURE WITH HYPOXIA: Primary | ICD-10-CM

## 2020-03-27 DIAGNOSIS — E63.9 POOR NUTRITION: ICD-10-CM

## 2020-03-27 DIAGNOSIS — E55.9 HYPOVITAMINOSIS D: ICD-10-CM

## 2020-03-27 DIAGNOSIS — J96.01 ACUTE RESPIRATORY FAILURE WITH HYPOXIA: ICD-10-CM

## 2020-03-27 DIAGNOSIS — M48.00 SPINAL STENOSIS, UNSPECIFIED SPINAL REGION: ICD-10-CM

## 2020-03-27 DIAGNOSIS — D50.8 IRON DEFICIENCY ANEMIA SECONDARY TO INADEQUATE DIETARY IRON INTAKE: Primary | ICD-10-CM

## 2020-03-27 DIAGNOSIS — I67.89 CEREBRAL MICROVASCULAR DISEASE: ICD-10-CM

## 2020-03-27 PROCEDURE — 25000003 PHARM REV CODE 250: Performed by: INTERNAL MEDICINE

## 2020-03-27 PROCEDURE — 63600175 PHARM REV CODE 636 W HCPCS: Performed by: INTERNAL MEDICINE

## 2020-03-27 PROCEDURE — 11000004 HC SNF PRIVATE

## 2020-03-27 RX ORDER — ATORVASTATIN CALCIUM 20 MG/1
40 TABLET, FILM COATED ORAL DAILY
Status: DISCONTINUED | OUTPATIENT
Start: 2020-03-28 | End: 2020-03-31

## 2020-03-27 RX ORDER — THIAMINE HCL 100 MG
100 TABLET ORAL DAILY
Status: DISCONTINUED | OUTPATIENT
Start: 2020-03-28 | End: 2020-03-31

## 2020-03-27 RX ORDER — PREDNISONE 20 MG/1
20 TABLET ORAL DAILY
Status: DISCONTINUED | OUTPATIENT
Start: 2020-03-28 | End: 2020-03-31

## 2020-03-27 RX ORDER — DICYCLOMINE HYDROCHLORIDE 10 MG/1
10 CAPSULE ORAL 4 TIMES DAILY PRN
Status: DISCONTINUED | OUTPATIENT
Start: 2020-03-27 | End: 2020-04-15 | Stop reason: HOSPADM

## 2020-03-27 RX ORDER — POLYETHYLENE GLYCOL 3350 17 G/17G
17 POWDER, FOR SOLUTION ORAL 2 TIMES DAILY PRN
Status: DISCONTINUED | OUTPATIENT
Start: 2020-03-27 | End: 2020-04-15 | Stop reason: HOSPADM

## 2020-03-27 RX ORDER — AMLODIPINE BESYLATE 10 MG/1
10 TABLET ORAL DAILY
Status: DISCONTINUED | OUTPATIENT
Start: 2020-03-28 | End: 2020-04-15 | Stop reason: HOSPADM

## 2020-03-27 RX ORDER — LANOLIN ALCOHOL/MO/W.PET/CERES
400 CREAM (GRAM) TOPICAL DAILY
Status: DISCONTINUED | OUTPATIENT
Start: 2020-03-28 | End: 2020-03-31

## 2020-03-27 RX ORDER — HYDROCODONE BITARTRATE AND ACETAMINOPHEN 5; 325 MG/1; MG/1
1 TABLET ORAL EVERY 8 HOURS PRN
Status: ON HOLD | COMMUNITY
End: 2020-04-14 | Stop reason: HOSPADM

## 2020-03-27 RX ORDER — PANTOPRAZOLE SODIUM 20 MG/1
20 TABLET, DELAYED RELEASE ORAL DAILY
Status: DISCONTINUED | OUTPATIENT
Start: 2020-03-28 | End: 2020-03-31

## 2020-03-27 RX ORDER — HYDROCODONE BITARTRATE AND ACETAMINOPHEN 5; 325 MG/1; MG/1
1 TABLET ORAL EVERY 8 HOURS PRN
Status: DISCONTINUED | OUTPATIENT
Start: 2020-03-27 | End: 2020-04-15 | Stop reason: HOSPADM

## 2020-03-27 RX ORDER — ENOXAPARIN SODIUM 100 MG/ML
40 INJECTION SUBCUTANEOUS EVERY 24 HOURS
Status: DISCONTINUED | OUTPATIENT
Start: 2020-03-27 | End: 2020-04-15 | Stop reason: HOSPADM

## 2020-03-27 RX ORDER — AMOXICILLIN 250 MG
1 CAPSULE ORAL 2 TIMES DAILY
Status: DISCONTINUED | OUTPATIENT
Start: 2020-03-27 | End: 2020-03-31

## 2020-03-27 RX ORDER — AMLODIPINE BESYLATE 10 MG/1
10 TABLET ORAL DAILY
COMMUNITY
End: 2020-04-20 | Stop reason: SDUPTHER

## 2020-03-27 RX ORDER — HYDROXYCHLOROQUINE SULFATE 200 MG/1
200 TABLET, FILM COATED ORAL DAILY
Status: DISCONTINUED | OUTPATIENT
Start: 2020-03-28 | End: 2020-03-27

## 2020-03-27 RX ORDER — MONTELUKAST SODIUM 10 MG/1
10 TABLET ORAL NIGHTLY
Status: DISCONTINUED | OUTPATIENT
Start: 2020-03-27 | End: 2020-04-15 | Stop reason: HOSPADM

## 2020-03-27 RX ORDER — ACETAMINOPHEN 325 MG/1
650 TABLET ORAL EVERY 8 HOURS PRN
Status: DISCONTINUED | OUTPATIENT
Start: 2020-03-27 | End: 2020-04-15 | Stop reason: HOSPADM

## 2020-03-27 RX ORDER — TALC
6 POWDER (GRAM) TOPICAL NIGHTLY
Status: DISCONTINUED | OUTPATIENT
Start: 2020-03-27 | End: 2020-03-31

## 2020-03-27 RX ORDER — ERGOCALCIFEROL 1.25 MG/1
50000 CAPSULE ORAL
Status: DISCONTINUED | OUTPATIENT
Start: 2020-03-28 | End: 2020-04-15 | Stop reason: HOSPADM

## 2020-03-27 RX ORDER — MYCOPHENOLATE MOFETIL 250 MG/1
500 CAPSULE ORAL 2 TIMES DAILY
Status: DISCONTINUED | OUTPATIENT
Start: 2020-03-27 | End: 2020-04-15 | Stop reason: HOSPADM

## 2020-03-27 RX ORDER — ONDANSETRON 8 MG/1
8 TABLET, ORALLY DISINTEGRATING ORAL EVERY 8 HOURS PRN
Status: DISCONTINUED | OUTPATIENT
Start: 2020-03-27 | End: 2020-04-15 | Stop reason: HOSPADM

## 2020-03-27 RX ORDER — ASPIRIN 81 MG/1
81 TABLET ORAL DAILY
Status: DISCONTINUED | OUTPATIENT
Start: 2020-03-28 | End: 2020-03-31

## 2020-03-27 RX ORDER — FUROSEMIDE 40 MG/1
40 TABLET ORAL DAILY
Status: DISCONTINUED | OUTPATIENT
Start: 2020-03-28 | End: 2020-04-15 | Stop reason: HOSPADM

## 2020-03-27 RX ORDER — HYDROXYCHLOROQUINE SULFATE 200 MG/1
200 TABLET, FILM COATED ORAL 2 TIMES DAILY
Status: DISCONTINUED | OUTPATIENT
Start: 2020-03-27 | End: 2020-04-15 | Stop reason: HOSPADM

## 2020-03-27 RX ORDER — SULFAMETHOXAZOLE AND TRIMETHOPRIM 800; 160 MG/1; MG/1
1 TABLET ORAL
Status: DISCONTINUED | OUTPATIENT
Start: 2020-03-30 | End: 2020-04-15 | Stop reason: HOSPADM

## 2020-03-27 RX ADMIN — MONTELUKAST 10 MG: 10 TABLET, FILM COATED ORAL at 09:03

## 2020-03-27 RX ADMIN — MYCOPHENOLATE MOFETIL 500 MG: 250 CAPSULE ORAL at 09:03

## 2020-03-27 RX ADMIN — Medication 6 MG: at 09:03

## 2020-03-27 RX ADMIN — BACLOFEN 10 MG: 10 TABLET ORAL at 09:03

## 2020-03-27 NOTE — TELEPHONE ENCOUNTER
----- Message from Eva Enciso sent at 3/27/2020  8:16 AM CDT -----  Contact: Susu [phone]  Patient is returning a phone call.  Who left a message for the patient: Unknown  Does patient know what this is regarding:  [facility]   Comments:

## 2020-03-27 NOTE — TELEPHONE ENCOUNTER
----- Message from Catalina Tapia sent at 3/27/2020 11:04 AM CDT -----  Contact: Tiffany(daughter)[phone]  Pt's daughter needs a call regarding referral. Please advise.

## 2020-03-27 NOTE — TELEPHONE ENCOUNTER
I am unclear on how to do a SNF to SNF transfer. I will place an order to complex case management to assist. Epic does not give me an option to place a 'skilled nursing' referral. Please contact Soni Rosado for assistance. The transfer center may need to get involoved as well.    Let me know if they can direct me to other referral orders.     Thank you.

## 2020-03-27 NOTE — TELEPHONE ENCOUNTER
----- Message from Susu Russell sent at 3/27/2020  9:27 AM CDT -----  Contact: Susu/ daughter/ [phone]  Please call daughter about referral for patient for SNF,

## 2020-03-28 PROCEDURE — 63600175 PHARM REV CODE 636 W HCPCS: Performed by: INTERNAL MEDICINE

## 2020-03-28 PROCEDURE — 11000004 HC SNF PRIVATE

## 2020-03-28 PROCEDURE — 97116 GAIT TRAINING THERAPY: CPT

## 2020-03-28 PROCEDURE — 97535 SELF CARE MNGMENT TRAINING: CPT

## 2020-03-28 PROCEDURE — 97530 THERAPEUTIC ACTIVITIES: CPT

## 2020-03-28 PROCEDURE — 97161 PT EVAL LOW COMPLEX 20 MIN: CPT

## 2020-03-28 PROCEDURE — 97165 OT EVAL LOW COMPLEX 30 MIN: CPT

## 2020-03-28 PROCEDURE — 25000003 PHARM REV CODE 250: Performed by: INTERNAL MEDICINE

## 2020-03-28 RX ADMIN — BACLOFEN 10 MG: 10 TABLET ORAL at 08:03

## 2020-03-28 RX ADMIN — Medication 6 MG: at 08:03

## 2020-03-28 RX ADMIN — Medication 400 MG: at 09:03

## 2020-03-28 RX ADMIN — Medication 100 MG: at 09:03

## 2020-03-28 RX ADMIN — HYDROXYCHLOROQUINE SULFATE 200 MG: 200 TABLET, FILM COATED ORAL at 10:03

## 2020-03-28 RX ADMIN — ATORVASTATIN CALCIUM 40 MG: 20 TABLET, FILM COATED ORAL at 09:03

## 2020-03-28 RX ADMIN — MYCOPHENOLATE MOFETIL 500 MG: 250 CAPSULE ORAL at 08:03

## 2020-03-28 RX ADMIN — AMLODIPINE BESYLATE 10 MG: 10 TABLET ORAL at 09:03

## 2020-03-28 RX ADMIN — PREDNISONE 20 MG: 20 TABLET ORAL at 09:03

## 2020-03-28 RX ADMIN — MYCOPHENOLATE MOFETIL 500 MG: 250 CAPSULE ORAL at 09:03

## 2020-03-28 RX ADMIN — HYDROCODONE BITARTRATE AND ACETAMINOPHEN 1 TABLET: 5; 325 TABLET ORAL at 03:03

## 2020-03-28 RX ADMIN — FUROSEMIDE 40 MG: 40 TABLET ORAL at 09:03

## 2020-03-28 RX ADMIN — PANTOPRAZOLE SODIUM 20 MG: 20 TABLET, DELAYED RELEASE ORAL at 09:03

## 2020-03-28 RX ADMIN — HYDROCODONE BITARTRATE AND ACETAMINOPHEN 1 TABLET: 5; 325 TABLET ORAL at 04:03

## 2020-03-28 RX ADMIN — SENNOSIDES AND DOCUSATE SODIUM 1 TABLET: 8.6; 5 TABLET ORAL at 09:03

## 2020-03-28 RX ADMIN — ASPIRIN 81 MG: 81 TABLET, COATED ORAL at 09:03

## 2020-03-28 RX ADMIN — BACLOFEN 10 MG: 10 TABLET ORAL at 09:03

## 2020-03-28 RX ADMIN — ERGOCALCIFEROL 50000 UNITS: 1.25 CAPSULE ORAL at 09:03

## 2020-03-28 RX ADMIN — MONTELUKAST 10 MG: 10 TABLET, FILM COATED ORAL at 08:03

## 2020-03-28 NOTE — PLAN OF CARE
Problem: Occupational Therapy Goal  Goal: Occupational Therapy Goal  Description  Goals to be met by:  4/8/2020    Patient will increase functional independence with ADLs by performing:    UE Dressing with Modified [place].  LE Dressing with Modified [place].  Grooming while standing at sink with Modified [place].  Toileting from toilet with Modified [place] for hygiene and clothing management.   Bathing from shower chair/bench with Modified [place].  Supine to sit with Modified [place] with HOB flat and no handrails.  Stand pivot transfers with Modified [place].  Toilet transfer to toilet with Modified [place].  Upper extremity exercise program 3 x 15 reps, with independence.  Patient will complete a dynamic standing activity for 8 min in order to perform household tasks.     Outcome: Ongoing, Progressing   Patient's goals are set.   AYAD Higuera  3/28/2020

## 2020-03-28 NOTE — PLAN OF CARE
Recommendations     Recommendation: 1. Add cardiac restriction to current diet order. 2. If PO intake < 50 %, add boost plus TID. 3. Onsite RD to f/u.   Goals: Meet > 85 % EEN/EPN by RD f/u   Nutrition Goal Status: new  Communication of RD Recs: (POC, sticky note)

## 2020-03-28 NOTE — PLAN OF CARE
PT eval completed. Pt will begin PT POC.  Marilyn Jauregui, PT  3/28/2020    Problem: Physical Therapy Goal  Goal: Physical Therapy Goal  Description  Goals to be met by: 2020     Patient will increase functional independence with mobility by performin. Supine to sit with Modified [place]  2. Sit to supine with Modified [place]  3. Rolling to Left and Right with Modified [place].  4. Sit to stand transfer with Supervision  5. Bed to chair transfer with Supervision using Rolling Walker  6. Gait  x 150 feet with Supervision using Rolling Walker.   7. Wheelchair propulsion x150 feet with Supervision using bilateral uppper extremities  8. Ascend/descend 4 stair with bilateral Handrails Supervision  9. Ascend/Descend 4 inch curb step with Stand-by Assistance using Rolling Walker.  10. Stand for 3 minutes with Stand-by Assistance using Rolling Walker while performing a dynamic standing activity  11. Pt will perform a car transfer w/ RW and SBA  12. Pt will walk over uneven surface (outside ramp) w/ RW and CGA  13. Pt will  object from floor in a standing position w/ RW, use of a reacher, and SPV     Outcome: Ongoing, Progressing

## 2020-03-28 NOTE — PT/OT/SLP EVAL
Occupational Therapy  Evaluation/Treatment    Selma Lux   MRN: [id number]   Admitting Diagnosis: <principal problem not specified>     OT Date of Treatment: 03/28/20   OT Start Time: 0930  OT Stop Time: 1030  OT Total Time (min): 60 min    Billable Minutes:  Evaluation 15  Self Care/Home Management 45    Diagnosis: <principal problem not specified>    Past Medical History:   Diagnosis Date    Acute hypoxemic respiratory failure 4/11/2018    Altered mental status     Anemia     Arthritis     Bilateral hand pain 3/14/2018    Branch retinal vein occlusion, left eye 2/20/2015    Chronic bilateral low back pain without sciatica 3/23/2017    Chronic renal failure in pediatric patient, stage 3 (moderate) 4/15/2018    Cognitive communication deficit 12/19/2017    Cystoid macular edema, left eye 2/20/2015    Cystoid macular edema, left eye 2/20/2015    DJD (degenerative joint disease) of cervical spine 5/15/2013    Fatty liver 8/26/2014    Goiter 4/9/2018    Hashimoto's disease     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    Hypertension     Hypertensive encephalopathy     IBS (irritable bowel syndrome) 6/21/2017    IGT (impaired glucose tolerance) 1/12/2016    Iron deficiency anemia secondary to inadequate dietary iron intake 6/24/2013    Joint pain     Keratoconjunctivitis sicca of both eyes not specified as Sjogren's 7/29/2016    Leiomyoma of uterus, unspecified 9/16/2014    Long QT interval 6/29/2016    Due to medication (plaquenil)     Macular edema 1/12/2015    Multinodular goiter 1/12/2016    Neuropathy 8/23/2017    Plaquenil causing adverse effect in therapeutic use 10/7/2016    Pneumococcal vaccine refused 8/17/2016    Pneumonia due to Streptococcus pneumoniae 4/5/2018    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/2015    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    s/p Right Total knee replacement 5/15/2013    Scoliosis of thoracic spine 5/15/2013    Small vessel disease,  cerebrovascular 12/28/2017    Stroke     UTI (urinary tract infection) 12/29/2018    Vascular dementia 12/6/2017      Past Surgical History:   Procedure Laterality Date    BREAST CYST EXCISION      CATARACT EXTRACTION      COLONOSCOPY N/A 9/29/2015    Procedure: COLONOSCOPY;  Surgeon: FIDELINA Sanchez MD;  Location: [address];  Service: Endoscopy;  Laterality: N/A;    EYE SURGERY      JOINT REPLACEMENT      right knee    KNEE SURGERY Left 12/31/2013    TKR    left parotidectomy      mixed tumor    SALIVARY GLAND SURGERY      TONSILLECTOMY           General Precautions: Standard, fall  Orthopedic Precautions: N/A  Braces: N/A    Spiritual, Cultural Beliefs, Judaism Practices, Values that Affect Care: no     Patient History:  Lives With: child(yash), adult  Living Arrangements: house  Home Accessibility: stairs to enter home(5)  Home Layout: Able to live on 1st floor  Stair Railings at Home: outside, present at both sides  Transportation Anticipated: family or friend will provide  Living Environment Comment: Patient lives with daughter in a H with 5 GLORIA and B HRs. PAtient has a tub shower combo with no bench or grab bars. Patient has a regular toilet with no grab bars. Daughter works during the day.   Equipment Currently Used at Home: bedside commode, wheelchair, walker, rolling, cane, straight    Prior level of function:    Bed Mobility/Transfers: needs device  Grooming: independent  Bathing: independent  Upper Body Dressing: independent  Lower Body Dressing: independent  Toileting: independent        Dominant hand: right    Subjective:  Communicated with patient prior to session.    Chief Complaint: none   Patient/Family stated goals: to go home    Pain/Comfort  Pain Rating 1: 0/10  Pain Rating Post-Intervention 1: 0/10    Objective:        Cognitive Exam:  Oriented to: Person, Place, Time and Situation  Follows Commands/attention: Follows multistep  commands  Communication:  clear/fluent  Memory:  No Deficits noted  Safety awareness/insight to disability: intact  Coping skills/emotional control: Appropriate to situation    Visual/perceptual:  Intact    Physical Exam:  Postural examination/scapula alignment:    -       Rounded shoulders  -       Forward head  Skin integrity: Visible skin intact  Edema: None noted      Sensation:      -       Intact    Upper Extremity Range of Motion:  Right Upper Extremity: limited shoulder flexion. Approximately 100 degrees  Left Upper Extremity: limited shoulder flexion. Approximately 90 degrees    Upper Extremity Strength:  Right Upper Extremity: WFL  Left Upper Extremity: WFL   Strength: WFL    Fine motor coordination:      -       Intact    Gross motor coordination: WFL    Occupational Performance:    Bed Mobility:    · Patient completed Supine to Sit with supervision     Functional Mobility/Transfers:  · Patient completed Sit <> Stand Transfer with contact guard assistance  with  hand-held assist   · Patient completed Bed > Chair Transfer using Stand Pivot technique with contact guard assistance with hand-held assist    Activities of Daily Living:  · Grooming: supervision oral care, washing face, and combing hair seated in w/c at sink  · Bathing: stand by assistance spongebath at w/c at sink  · Upper Body Dressing: stand by assistance [place] front gown and donning pullover shirt  · Lower Body Dressing: minimum assistance [place] and donning socks. Donning pants    AMPA 6 Click:  AMPAC Total Score: 19    Additional Treatment:  Role of OT and POC  Safety  ADL retraining  Functional mobility training  Discharge planning    Patient left up in chair with call button in reach, nursing notified and all needs met.     Assessment:  Selma Lux is a 84 y.o. female with a medical diagnosis of <principal problem not specified> and presents with the deficits listed below. Patient would benefit from continued skilled OT services to improve  functional mobility, increase independence with ADLs, and address established goals.    Rehab identified problem list/impairments: weakness, impaired endurance, impaired self care skills, impaired functional mobilty, gait instability, impaired balance, decreased upper extremity function, decreased ROM    Rehab potential is good    Activity tolerance: Good    Discharge recommendations: home with home health     Barriers to discharge: Decreased caregiver support, Inaccessible home environment     Equipment recommendations: bath bench     GOALS:   Multidisciplinary Problems     Occupational Therapy Goals        Problem: Occupational Therapy Goal    Goal Priority Disciplines Outcome Interventions   Occupational Therapy Goal     OT, PT/OT Ongoing, Progressing    Description:  Goals to be met by:  4/8/2020    Patient will increase functional independence with ADLs by performing:    UE Dressing with Modified [place].  LE Dressing with Modified [place].  Grooming while standing at sink with Modified [place].  Toileting from toilet with Modified [place] for hygiene and clothing management.   Bathing from shower chair/bench with Modified [place].  Supine to sit with Modified [place] with HOB flat and no handrails.  Stand pivot transfers with Modified [place].  Toilet transfer to toilet with Modified [place].  Upper extremity exercise program 3 x 15 reps, with independence.  Patient will complete a dynamic standing activity for 8 min in order to perform household tasks.                      PLAN: Patient to be seen 5 x/week to address the above listed problems via self-care/home management, therapeutic activities, therapeutic exercises  Plan of Care expires: 04/28/20  Plan of Care reviewed with: patient    AYAD Higuera  03/28/2020

## 2020-03-28 NOTE — PT/OT/SLP EVAL
PhysicalTherapy   Evaluation    Selma Lux   MRN: [id number]     PT Received On: 03/28/20  PT Start Time: 1104     PT Stop Time: 1204    PT Total Time (min): 60 min       Billable Minutes:  Evaluation 15, Gait Training 15, Therapeutic Activity 30 and Total Time 45    Diagnosis: Respiratory Failure  Past Medical History:   Diagnosis Date    Acute hypoxemic respiratory failure 4/11/2018    Altered mental status     Anemia     Arthritis     Bilateral hand pain 3/14/2018    Branch retinal vein occlusion, left eye 2/20/2015    Chronic bilateral low back pain without sciatica 3/23/2017    Chronic renal failure in pediatric patient, stage 3 (moderate) 4/15/2018    Cognitive communication deficit 12/19/2017    Cystoid macular edema, left eye 2/20/2015    Cystoid macular edema, left eye 2/20/2015    DJD (degenerative joint disease) of cervical spine 5/15/2013    Fatty liver 8/26/2014    Goiter 4/9/2018    Hashimoto's disease     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    Hypertension     Hypertensive encephalopathy     IBS (irritable bowel syndrome) 6/21/2017    IGT (impaired glucose tolerance) 1/12/2016    Iron deficiency anemia secondary to inadequate dietary iron intake 6/24/2013    Joint pain     Keratoconjunctivitis sicca of both eyes not specified as Sjogren's 7/29/2016    Leiomyoma of uterus, unspecified 9/16/2014    Long QT interval 6/29/2016    Due to medication (plaquenil)     Macular edema 1/12/2015    Multinodular goiter 1/12/2016    Neuropathy 8/23/2017    Plaquenil causing adverse effect in therapeutic use 10/7/2016    Pneumococcal vaccine refused 8/17/2016    Pneumonia due to Streptococcus pneumoniae 4/5/2018    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/2015    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    s/p Right Total knee replacement 5/15/2013    Scoliosis of thoracic spine 5/15/2013    Small vessel disease, cerebrovascular 12/28/2017    Stroke     UTI (urinary  tract infection) 12/29/2018    Vascular dementia 12/6/2017      Past Surgical History:   Procedure Laterality Date    BREAST CYST EXCISION      CATARACT EXTRACTION      COLONOSCOPY N/A 9/29/2015    Procedure: COLONOSCOPY;  Surgeon: FIDELINA Sanchez MD;  Location: [facility] ([address] [place] [address]);  Service: Endoscopy;  Laterality: N/A;    EYE SURGERY      JOINT REPLACEMENT      right knee    KNEE SURGERY Left 12/31/2013    TKR    left parotidectomy      mixed tumor    SALIVARY GLAND SURGERY      TONSILLECTOMY           General Precautions: Standard, fall  Orthopedic Precautions: N/A   Braces: N/A    Spiritual, Cultural Beliefs, Christianity Practices, Values that Affect Care: no    Patient History:  Lives With: child(yash), adult  Living Arrangements: house  Home Accessibility: stairs to enter home  Home Layout: Able to live on 1st floor  Stair Railings at Home: outside, present at both sides  Transportation Anticipated: family or friend will provide  Living Environment Comment: Pt lives w/ her daughter in a 1SH w/ 5 GLORIA and BHR. She has a tub/shower combo. Pt's daughter works during the day but can assist her once she is home.   Equipment Currently Used at Home: bedside commode, wheelchair, walker, rolling  DME owned (not currently used): single point cane    Previous Level of Function: Pt reports that she has been in and out of the hospital multiple times for the past year. Prior to then she reports that she used a RW for HH mobility. She would use either her SPC or w/c for the community depending on how she was feeling. Pt's daughter would assist her if needed for ADLs including tub transfers, bathing, and dressing.   Ambulation Skills: needs device  Transfer Skills: needs device  ADL Skills: needs device and assist    Subjective:  Communicated with patient prior to session.  Pt agreeable to session.  Chief Complaint: shoulder weakness/stiffness  Patient goals: to return to PLOF    Pain/Comfort  Pain Rating 1: 0/10  Pain  "Rating Post-Intervention 1: 0/10    Objective:  Patient found sitting in w/c.      Cognitive Exam:  Oriented to: Person, Place, Time and Situation  Follows Commands/attention: Follows two-step commands  Communication: clear/fluent  Safety awareness/insight to disability: intact    Physical Exam:  Postural examination/scapula alignment:    -       Rounded shoulders  -       Forward head    Skin integrity: Visible skin intact  Edema: None noted     Sensation:      -       Intact    Upper Extremity Range of Motion:  Right Upper Extremity: WFL except shoudler flexion AROM ~100 degrees  Left Upper Extremity: WFL except shoulder flexion AROM <90 degrees     Upper Extremity Strength:  Right Upper Extremity: WFL except shoulder flexion 3+/5  Left Upper Extremity: WFL except shoulder flexion 2+/5    Lower Extremity Range of Motion:  Right Lower Extremity: WFL  Left Lower Extremity: WFL    Lower Extremity Strength:  Right Lower Extremity: WFL  Left Lower Extremity: WFL     Fine motor coordination:     -       Intact  Left hand thumb/finger opposition skills and Right hand thumb/finger opposition skills    Gross motor coordination: WFL    AM-PAC 6 CLICK MOBILITY  Total Score:18    Bed Mobility:  Sit>Supine:on bed w/ SPV  Supine>Sit: on bed w/ SPV  Roll Left/Right: on bed w/ SPV  All w/ HOB flat and w/o use of side rail    Transfers:  Sit<>Stand: to/from w/c, multiple trials, w/ RW and CGA  Stand Pivot Transfer: w/c<>EOB w/ RW and CGA  Toilet transfer: to/from toilet w/ RW and CGA  Cues for hand placement    Gait:  Amb 90 ft w/ RW and CGA for safety  Cues for posture and to remain inside RW  Limited in distance by fatigue     Advanced Gait:  Stairs: asc/jhonny 4 steps w/ BHR and CGA  Curb Step: asc/jhonny 4" curb w/ RW and CGA  Cues/demo for technique and safety    Wheelchair Mobility:  Patient propels w/c 75ft w/ BUE and SPV  Limited in distance by fatigue     Balance:  Pt picked up cones while standing w/ use of RW and reacher, w/ " CGA for safety    Additional Treatment:  Pt was educated on PT role and POC. Pt verb understanding. White board was updated.    Patient left up in chair with call button in reach and PCT notified.    Assessment:  Selma Lux is a 84 y.o. female with a medical diagnosis of Respiratory Failure. Pt kristina session well w/ good participation. PTA she was [place] w/ mobility w/ use of RW. She is currently limited by the below listed deficits requiring CGA for safety w/ transfers, gait, and stairs. Pt is appropriate for skilled PT services and will begin PT POC.    Rehab identified problem list/impairments: impaired endurance, impaired self care skills, impaired functional mobilty, gait instability, impaired balance, decreased upper extremity function    Rehab potential is good.    Activity tolerance: Good    Discharge recommendations: home with home health(w/ light family assistance recomended)     Barriers to discharge: Inaccessible home environment, Decreased caregiver support    Equipment recommendations: bath bench     GOALS:   Multidisciplinary Problems     Physical Therapy Goals        Problem: Physical Therapy Goal    Goal Priority Disciplines Outcome Goal Variances Interventions   Physical Therapy Goal     PT, PT/OT Ongoing, Progressing     Description:  Goals to be met by: 2020     Patient will increase functional independence with mobility by performin. Supine to sit with Modified [place]  2. Sit to supine with Modified [place]  3. Rolling to Left and Right with Modified [place].  4. Sit to stand transfer with Supervision  5. Bed to chair transfer with Supervision using Rolling Walker  6. Gait  x 150 feet with Supervision using Rolling Walker.   7. Wheelchair propulsion x150 feet with Supervision using bilateral uppper extremities  8. Ascend/descend 4 stair with bilateral Handrails Supervision  9. Ascend/Descend 4 inch curb step with Stand-by Assistance using Rolling Walker.  10.  Stand for 3 minutes with Stand-by Assistance using Rolling Walker while performing a dynamic standing activity  11. Pt will perform a car transfer w/ RW and SBA  12. Pt will walk over uneven surface (outside ramp) w/ RW and CGA  13. Pt will  object from floor in a standing position w/ RW, use of a reacher, and SPV                      PLAN:    Patient to be seen 5 x/week  to address the above listed problems via therapeutic activities, gait training, therapeutic exercises  Plan of Care Expires: 04/27/20    Marilyn Jauregui, PT 3/28/2020

## 2020-03-28 NOTE — CONSULTS
"  [facility] - Skilled Nursing Care  Adult Nutrition  Consult Note    SUMMARY     Recommendations    Recommendation: 1. Add cardiac restriction to current diet order. 2. If PO intake < 50 %, add boost plus TID. 3. Onsite RD to f/u.   Goals: Meet > 85 % EEN/EPN by RD f/u   Nutrition Goal Status: new  Communication of RD Recs: (POC, sticky note)    Reason for Assessment    Reason For Assessment: consult  Diagnosis: Respiratory failure   Relevant Medical History:  Anemia, CKD stage 3, HTN, Stroke, vascular dementia   General Information Comments: RD covering remotely. No answer to patient's room. Pt currently on regular diet. No known intake since admit. Per epic records, wt fluctuations noted. NFPE not performed d/t remote coverage.   Nutrition Discharge Planning: Cardiac diet     Nutrition Risk Screen    Nutrition Risk Screen: no indicators present    Nutrition/Diet History    Spiritual, Cultural Beliefs, Holiness Practices, Values that Affect Care: no    Anthropometrics    Temp: 98 °F (36.7 °C)  Height Method: Stated  Height: 5' 4" (162.6 cm)  Height (inches): 64 in  Weight Method: Standard Scale  Weight: 81.6 kg (179 lb 14.3 oz)  Weight (lb): 179.9 lb  Ideal Body Weight (IBW), Female: 120 lb  % Ideal Body Weight, Female (lb): 149.92 %  BMI (Calculated): 30.9  BMI Grade: 30 - 34.9- obesity - grade I       Lab/Procedures/Meds    Pertinent Labs Reviewed: reviewed  BMP  Lab Results   Component Value Date     03/10/2020    K 4.2 03/10/2020     03/10/2020    CO2 26 03/10/2020    BUN 31 (H) 03/10/2020    CREATININE 1.2 03/10/2020    CALCIUM 8.6 (L) 03/10/2020    ANIONGAP 9 03/10/2020    ESTGFRAFRICA 48.6 (A) 03/10/2020    EGFRNONAA 42.2 (A) 03/10/2020     Lab Results   Component Value Date    CALCIUM 8.6 (L) 03/10/2020    PHOS 2.6 (L) 03/10/2020     Lab Results   Component Value Date    ALBUMIN 2.4 (L) 03/06/2020     No results for input(s): POCTGLUCOSE in the last 24 hours.    Pertinent Medications Reviewed: " reviewed    Estimated/Assessed Needs    Weight Used For Calorie Calculations: 81.6 kg (179 lb 14.3 oz)  Energy Calorie Requirements (kcal):  1513   Energy Need Method: [place]-St Jeor(x1.2 )  Protein Requirements: 64 g  Weight Used For Protein Calculations: 81.6 kg (179 lb 14.3 oz)     Estimated Fluid Requirement Method: RDA Method(or per MD)  RDA Method (mL): 1513         Nutrition Prescription Ordered    Current Diet Order: regular diet     Evaluation of Received Nutrient/Fluid Intake    No intake or output data in the 24 hours ending 03/28/20 1503       % Intake of Estimated Energy Needs: Other: KERRY  % Meal Intake: Other: KERRY    Nutrition Risk      2xweekly    Assessment and Plan    Nutrition Problem  Decreased nutrient needs (Na, fat)     Related to (etiology):   Past medical hx     Signs and Symptoms (as evidenced by):   HTN, stroke     Interventions  [place]/fat modified diet     Nutrition Diagnosis Status:   New        Monitor and Evaluation    Food and Nutrient Intake: energy intake  Food and Nutrient Adminstration: diet order  Knowledge/Beliefs/Attitudes: food and nutrition knowledge/skill  Anthropometric Measurements: weight  Biochemical Data, Medical Tests and Procedures: electrolyte and renal panel, glucose/endocrine profile  Nutrition-Focused Physical Findings: overall appearance     Malnutrition Assessment                   to be completed at f/u                     Nutrition Follow-Up    RD Follow-up?: Yes

## 2020-03-29 PROCEDURE — 63600175 PHARM REV CODE 636 W HCPCS: Performed by: INTERNAL MEDICINE

## 2020-03-29 PROCEDURE — 25000003 PHARM REV CODE 250: Performed by: INTERNAL MEDICINE

## 2020-03-29 PROCEDURE — 11000004 HC SNF PRIVATE

## 2020-03-29 RX ADMIN — FUROSEMIDE 40 MG: 40 TABLET ORAL at 08:03

## 2020-03-29 RX ADMIN — Medication 400 MG: at 08:03

## 2020-03-29 RX ADMIN — PREDNISONE 20 MG: 20 TABLET ORAL at 08:03

## 2020-03-29 RX ADMIN — AMLODIPINE BESYLATE 10 MG: 10 TABLET ORAL at 08:03

## 2020-03-29 RX ADMIN — HYDROXYCHLOROQUINE SULFATE 200 MG: 200 TABLET, FILM COATED ORAL at 10:03

## 2020-03-29 RX ADMIN — Medication 6 MG: at 09:03

## 2020-03-29 RX ADMIN — HYDROXYCHLOROQUINE SULFATE 200 MG: 200 TABLET, FILM COATED ORAL at 01:03

## 2020-03-29 RX ADMIN — ATORVASTATIN CALCIUM 40 MG: 20 TABLET, FILM COATED ORAL at 08:03

## 2020-03-29 RX ADMIN — BACLOFEN 10 MG: 10 TABLET ORAL at 08:03

## 2020-03-29 RX ADMIN — PANTOPRAZOLE SODIUM 20 MG: 20 TABLET, DELAYED RELEASE ORAL at 08:03

## 2020-03-29 RX ADMIN — BACLOFEN 10 MG: 10 TABLET ORAL at 09:03

## 2020-03-29 RX ADMIN — MONTELUKAST 10 MG: 10 TABLET, FILM COATED ORAL at 09:03

## 2020-03-29 RX ADMIN — MYCOPHENOLATE MOFETIL 500 MG: 250 CAPSULE ORAL at 08:03

## 2020-03-29 RX ADMIN — MYCOPHENOLATE MOFETIL 500 MG: 250 CAPSULE ORAL at 09:03

## 2020-03-29 RX ADMIN — HYDROCODONE BITARTRATE AND ACETAMINOPHEN 1 TABLET: 5; 325 TABLET ORAL at 01:03

## 2020-03-29 RX ADMIN — SENNOSIDES AND DOCUSATE SODIUM 1 TABLET: 8.6; 5 TABLET ORAL at 09:03

## 2020-03-29 RX ADMIN — Medication 100 MG: at 08:03

## 2020-03-29 RX ADMIN — ASPIRIN 81 MG: 81 TABLET, COATED ORAL at 08:03

## 2020-03-29 RX ADMIN — ENOXAPARIN SODIUM 40 MG: 100 INJECTION SUBCUTANEOUS at 04:03

## 2020-03-29 NOTE — PLAN OF CARE
Problem: Wound  Goal: Optimal Wound Healing  Outcome: Ongoing, Progressing     Problem: Wound  Goal: Optimal Wound Healing  Outcome: Ongoing, Progressing     Problem: Wound  Goal: Optimal Wound Healing  Outcome: Ongoing, Progressing     Problem: Wound  Goal: Optimal Wound Healing  Outcome: Ongoing, Progressing     Problem: Wound  Goal: Optimal Wound Healing  Outcome: Ongoing, Progressing     Problem: Wound  Goal: Optimal Wound Healing  Outcome: Ongoing, Progressing     Problem: Wound  Goal: Optimal Wound Healing  Outcome: Ongoing, Progressing

## 2020-03-30 LAB
ANION GAP SERPL CALC-SCNC: 12 MMOL/L (ref 8–16)
ANISOCYTOSIS BLD QL SMEAR: SLIGHT
BASOPHILS # BLD AUTO: 0.01 K/UL (ref 0–0.2)
BASOPHILS NFR BLD: 0.1 % (ref 0–1.9)
BUN SERPL-MCNC: 22 MG/DL (ref 8–23)
BURR CELLS BLD QL SMEAR: ABNORMAL
CALCIUM SERPL-MCNC: 8.3 MG/DL (ref 8.7–10.5)
CHLORIDE SERPL-SCNC: 104 MMOL/L (ref 95–110)
CO2 SERPL-SCNC: 25 MMOL/L (ref 23–29)
CREAT SERPL-MCNC: 1 MG/DL (ref 0.5–1.4)
DIFFERENTIAL METHOD: ABNORMAL
EOSINOPHIL # BLD AUTO: 0.2 K/UL (ref 0–0.5)
EOSINOPHIL NFR BLD: 1.7 % (ref 0–8)
ERYTHROCYTE [DISTWIDTH] IN BLOOD BY AUTOMATED COUNT: 20.4 % (ref 11.5–14.5)
EST. GFR  (AFRICAN AMERICAN): >60 ML/MIN/1.73 M^2
EST. GFR  (NON AFRICAN AMERICAN): 52.6 ML/MIN/1.73 M^2
GLUCOSE SERPL-MCNC: 72 MG/DL (ref 70–110)
HCT VFR BLD AUTO: 39.6 % (ref 37–48.5)
HGB BLD-MCNC: 11.6 G/DL (ref 12–16)
HYPOCHROMIA BLD QL SMEAR: ABNORMAL
IMM GRANULOCYTES # BLD AUTO: 0.06 K/UL (ref 0–0.04)
IMM GRANULOCYTES NFR BLD AUTO: 0.5 % (ref 0–0.5)
LYMPHOCYTES # BLD AUTO: 2.6 K/UL (ref 1–4.8)
LYMPHOCYTES NFR BLD: 23 % (ref 18–48)
MAGNESIUM SERPL-MCNC: 1.9 MG/DL (ref 1.6–2.6)
MCH RBC QN AUTO: 26.1 PG (ref 27–31)
MCHC RBC AUTO-ENTMCNC: 29.3 G/DL (ref 32–36)
MCV RBC AUTO: 89 FL (ref 82–98)
MONOCYTES # BLD AUTO: 2.1 K/UL (ref 0.3–1)
MONOCYTES NFR BLD: 18.3 % (ref 4–15)
NEUTROPHILS # BLD AUTO: 6.4 K/UL (ref 1.8–7.7)
NEUTROPHILS NFR BLD: 56.4 % (ref 38–73)
NRBC BLD-RTO: 0 /100 WBC
OVALOCYTES BLD QL SMEAR: ABNORMAL
PHOSPHATE SERPL-MCNC: 3 MG/DL (ref 2.7–4.5)
PLATELET # BLD AUTO: 95 K/UL (ref 150–350)
PMV BLD AUTO: 12.7 FL (ref 9.2–12.9)
POIKILOCYTOSIS BLD QL SMEAR: SLIGHT
POLYCHROMASIA BLD QL SMEAR: ABNORMAL
POTASSIUM SERPL-SCNC: 3.5 MMOL/L (ref 3.5–5.1)
RBC # BLD AUTO: 4.45 M/UL (ref 4–5.4)
SCHISTOCYTES BLD QL SMEAR: ABNORMAL
SODIUM SERPL-SCNC: 141 MMOL/L (ref 136–145)
WBC # BLD AUTO: 11.29 K/UL (ref 3.9–12.7)

## 2020-03-30 PROCEDURE — 80048 BASIC METABOLIC PNL TOTAL CA: CPT

## 2020-03-30 PROCEDURE — 97116 GAIT TRAINING THERAPY: CPT | Mod: CQ

## 2020-03-30 PROCEDURE — 97110 THERAPEUTIC EXERCISES: CPT | Mod: CQ

## 2020-03-30 PROCEDURE — 97535 SELF CARE MNGMENT TRAINING: CPT

## 2020-03-30 PROCEDURE — 97530 THERAPEUTIC ACTIVITIES: CPT | Mod: CQ

## 2020-03-30 PROCEDURE — 97530 THERAPEUTIC ACTIVITIES: CPT | Mod: CO

## 2020-03-30 PROCEDURE — 25000003 PHARM REV CODE 250: Performed by: INTERNAL MEDICINE

## 2020-03-30 PROCEDURE — 36415 COLL VENOUS BLD VENIPUNCTURE: CPT

## 2020-03-30 PROCEDURE — 11000004 HC SNF PRIVATE

## 2020-03-30 PROCEDURE — 83735 ASSAY OF MAGNESIUM: CPT

## 2020-03-30 PROCEDURE — 63600175 PHARM REV CODE 636 W HCPCS: Performed by: INTERNAL MEDICINE

## 2020-03-30 PROCEDURE — 85025 COMPLETE CBC W/AUTO DIFF WBC: CPT

## 2020-03-30 PROCEDURE — 84100 ASSAY OF PHOSPHORUS: CPT

## 2020-03-30 PROCEDURE — 92523 SPEECH SOUND LANG COMPREHEN: CPT

## 2020-03-30 RX ADMIN — ATORVASTATIN CALCIUM 40 MG: 20 TABLET, FILM COATED ORAL at 09:03

## 2020-03-30 RX ADMIN — SENNOSIDES AND DOCUSATE SODIUM 1 TABLET: 8.6; 5 TABLET ORAL at 09:03

## 2020-03-30 RX ADMIN — MYCOPHENOLATE MOFETIL 500 MG: 250 CAPSULE ORAL at 09:03

## 2020-03-30 RX ADMIN — FUROSEMIDE 40 MG: 40 TABLET ORAL at 09:03

## 2020-03-30 RX ADMIN — AMLODIPINE BESYLATE 10 MG: 10 TABLET ORAL at 09:03

## 2020-03-30 RX ADMIN — PREDNISONE 20 MG: 20 TABLET ORAL at 09:03

## 2020-03-30 RX ADMIN — MYCOPHENOLATE MOFETIL 500 MG: 250 CAPSULE ORAL at 08:03

## 2020-03-30 RX ADMIN — ASPIRIN 81 MG: 81 TABLET, COATED ORAL at 09:03

## 2020-03-30 RX ADMIN — Medication 100 MG: at 09:03

## 2020-03-30 RX ADMIN — HYDROXYCHLOROQUINE SULFATE 200 MG: 200 TABLET, FILM COATED ORAL at 08:03

## 2020-03-30 RX ADMIN — MONTELUKAST 10 MG: 10 TABLET, FILM COATED ORAL at 08:03

## 2020-03-30 RX ADMIN — SENNOSIDES AND DOCUSATE SODIUM 1 TABLET: 8.6; 5 TABLET ORAL at 08:03

## 2020-03-30 RX ADMIN — HYDROXYCHLOROQUINE SULFATE 200 MG: 200 TABLET, FILM COATED ORAL at 09:03

## 2020-03-30 RX ADMIN — BACLOFEN 10 MG: 10 TABLET ORAL at 09:03

## 2020-03-30 RX ADMIN — PANTOPRAZOLE SODIUM 20 MG: 20 TABLET, DELAYED RELEASE ORAL at 09:03

## 2020-03-30 RX ADMIN — Medication 6 MG: at 08:03

## 2020-03-30 RX ADMIN — Medication 400 MG: at 09:03

## 2020-03-30 RX ADMIN — SULFAMETHOXAZOLE AND TRIMETHOPRIM 1 TABLET: 800; 160 TABLET ORAL at 09:03

## 2020-03-30 RX ADMIN — HYDROCODONE BITARTRATE AND ACETAMINOPHEN 1 TABLET: 5; 325 TABLET ORAL at 02:03

## 2020-03-30 NOTE — PT/OT/SLP EVAL
Speech Language Pathology  Evaluation + Education    Selma Lux   MRN: [id number]   Admitting Diagnosis: <principal problem not specified>    Diet recommendations: Solid Diet Level: Regular  Liquid Diet Level: Thin Monitor for s/s of aspiration and Standard aspiration precautions    SLP Treatment Date: 03/30/20  Speech Start Time: 1140     Speech Stop Time: 1213     Speech Total (min): 33 min       TREATMENT BILLABLE MINUTES:  Eval 15  and [facility]/Home Management Training 18    Diagnosis: <principal problem not specified>      Past Medical History:   Diagnosis Date    Acute hypoxemic respiratory failure 4/11/2018    Altered mental status     Anemia     Arthritis     Bilateral hand pain 3/14/2018    Branch retinal vein occlusion, left eye 2/20/2015    Chronic bilateral low back pain without sciatica 3/23/2017    Chronic renal failure in pediatric patient, stage 3 (moderate) 4/15/2018    Cognitive communication deficit 12/19/2017    Cystoid macular edema, left eye 2/20/2015    Cystoid macular edema, left eye 2/20/2015    DJD (degenerative joint disease) of cervical spine 5/15/2013    Fatty liver 8/26/2014    Goiter 4/9/2018    Hashimoto's disease     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    Hypertension     Hypertensive encephalopathy     IBS (irritable bowel syndrome) 6/21/2017    IGT (impaired glucose tolerance) 1/12/2016    Iron deficiency anemia secondary to inadequate dietary iron intake 6/24/2013    Joint pain     Keratoconjunctivitis sicca of both eyes not specified as Sjogren's 7/29/2016    Leiomyoma of uterus, unspecified 9/16/2014    Long QT interval 6/29/2016    Due to medication (plaquenil)     Macular edema 1/12/2015    Multinodular goiter 1/12/2016    Neuropathy 8/23/2017    Plaquenil causing adverse effect in therapeutic use 10/7/2016    Pneumococcal vaccine refused 8/17/2016    Pneumonia due to Streptococcus pneumoniae 4/5/2018    Primary osteoarthritis involving multiple joints  "10/21/2015    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    s/p Right Total knee replacement 5/15/2013    Scoliosis of thoracic spine 5/15/2013    Small vessel disease, cerebrovascular 12/28/2017    Stroke     UTI (urinary tract infection) 12/29/2018    Vascular dementia 12/6/2017     Past Surgical History:   Procedure Laterality Date    BREAST CYST EXCISION      CATARACT EXTRACTION      COLONOSCOPY N/A 9/29/2015    Procedure: COLONOSCOPY;  Surgeon: FIDELINA Sanchez MD;  Location: [facility] ([address] [place] [address]);  Service: Endoscopy;  Laterality: N/A;    EYE SURGERY      JOINT REPLACEMENT      right knee    KNEE SURGERY Left 12/31/2013    TKR    left parotidectomy      mixed tumor    SALIVARY GLAND SURGERY      TONSILLECTOMY         Has the patient been evaluated by SLP for swallowing? : No  Keep patient NPO?: No General Precautions: Standard, fall    Current Respiratory Status: room air     Social Hx: Patient states daughter lives with her.  Pt did not given clear information regarding level of supervision premorbidly.  Pt stated she has family members that live very close.  Pt does not drive. Daughter cooks and manages medications.  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  Pt also reports having 3 written 3 books and sang previously.     Subjective:  "I was already forgetting and I didn't pay attention." pt stated when SLP inquired about any residual cognitive effects post previous stroke.      Objective:              Cognitive Status:  Behavioral Observations: alert, appropriate and cooperative-  Memory and Orientation: O x 4.  Immediate recall was WFL. Following a 2 minute delay, pt recalled 3/3 unrelated words given mod cues (1/3 ind'ly).   Attention: decreased sustained attention to complete tasks  Problem Solving: Cues required maintain attention to task to provide solutions to moderate level problem situations.  Pt was unable to complete task requiring " her to generate 3 possible causes for a hypothetical problem. Pt's response became tangential and redirection was ineffective.   Executive Function: mental flexibility, organization, planning and self-monitoring    Auditory Comprehension: answers yes/no questionscomplex = 100% accuracy. Pt followed 2-step command, but did not follow a 3-step command.     Verbal Expression: conversational speech Pt somewhat nonspecific and tangential during conversational speech.  Disorganized thought processes noted at times. During a word fluency task, pt listed 10 items in a category in one minute (15-20 is wnl).     Motor Speech: wfl    Voice: wfl    Reading: tba    Writing: tba    Visual-Spatial: tba    Additional Treatment:  Education provided to pt regarding role of SLP, reason for cognitive evaluation, cognition, plan for ongoing assessment of reading and writing, plan to engage pt is trial cognitive therapy, benefits of cognitive stimulation, and SLP POC. Pt expressed understanding and was in agreement with plan.     Assessment:  Selma Lux is a 84 y.o. female with a medical diagnosis of <principal problem not specified> and presents with cognitive-linguistic deficits.      Discharge recommendations: Discharge Facility/Level of Care Needs: (tbd)     Goals:   Multidisciplinary Problems     SLP Goals        Problem: SLP Goal    Goal Priority Disciplines Outcome   SLP Goal     SLP Ongoing, Progressing   Description:  Speech Language Pathology Goals  Goals expected to be met by 4/6:  1. Pt will recall 3/3 words or facts after a 2 minute delay given min cues.   2. Pt will follow 3-step commands with 70% accuracy given mod cues.   3. Pt will complete moderate level problem solving tasks with 70% accuracy given min-mod cues.   4. Pt will complete mental manipulation tasks with 70% accuracy given mod cues.   5. Pt will complete sustained attention tasks with 70% accuracy given mod cues.   6. Pt will participate in further  assessment of reading, writing, and visual spatial abilities.                              Plan:   Patient to be seen Therapy Frequency: 3 x/week   Planned Interventions: Cognitive-Linguistic Therapy  Plan of Care expires: 04/30/20  Plan of Care reviewed with: patient  SLP Follow-up?: Yes              XUAN Jean-Baptiste, CCC-SLP  03/30/2020    XUAN Jean-Baptiste, CCC-SLP  Speech Language Pathologist  [phone]  3/30/2020

## 2020-03-30 NOTE — PLAN OF CARE
Problem: SLP Goal  Goal: SLP Goal  Description  Speech Language Pathology Goals  Goals expected to be met by 4/6:  1. Pt will recall 3/3 words or facts after a 2 minute delay given min cues.   2. Pt will follow 3-step commands with 70% accuracy given mod cues.   3. Pt will complete moderate level problem solving tasks with 70% accuracy given min-mod cues.   4. Pt will complete mental manipulation tasks with 70% accuracy given mod cues.   5. Pt will complete sustained attention tasks with 70% accuracy given mod cues.   6. Pt will participate in further assessment of reading, writing, and visual spatial abilities.            Outcome: Ongoing, Progressing  Speech/language/cognitive evaluation completed. SLP will follow 3x/wk.  Will attempt to contact family to obtain information regarding cognitive baseline.   XUAN Jean-Baptiste, CCC-SLP  Speech Language Pathologist  [phone]  3/30/2020

## 2020-03-30 NOTE — PLAN OF CARE
Problem: Wound  Goal: Optimal Wound Healing  Outcome: Ongoing, Progressing     Problem: Adult Inpatient Plan of Care  Goal: Plan of Care Review  Outcome: Ongoing, Progressing     Problem: Adult Inpatient Plan of Care  Goal: Absence of Hospital-Acquired Illness or Injury  Outcome: Ongoing, Progressing

## 2020-03-30 NOTE — PT/OT/SLP PROGRESS
Physical Therapy  Treatment    Selma Lux   MRN: [id number]   Admitting Diagnosis: <principal problem not specified>    PT Received On: 03/30/20  Total Time (min): (--)       Billable Minutes:  Gait Training 10, Therapeutic Activity 15 and Therapeutic Exercise 20    Treatment Type: Treatment  PT/PTA: PTA     PTA Visit Number: 1       General Precautions: Standard, fall  Orthopedic Precautions: N/A   Braces: N/A    Spiritual, Cultural Beliefs, Sikhism Practices, Values that Affect Care: no    Subjective:  Communicated with nursing prior to session.  Pt agreed to work with therapy.     Pain/Comfort  Pain Rating 1: 0/10  Pain Rating Post-Intervention 1: 0/10    Objective:  Patient found seated w/c.       AM-PAC 6 CLICK MOBILITY  Total Score:18    Bed Mobility:  Sit>Supine:on mat SPV  Supine>Sit: on mat SPV    Transfers:  Sit<>Stand: to/from w/c, to/from mat, to/from recumbent stepper w/ RW and CGA  Stand Pivot Transfer: w/c to recumbent stepper w/ RW and CGA    Gait:  Amb 94ft w/ RW and CGA. No LOB noted. Distance limited 2* to pt fatigue. Pt ambulated an additional 30ft in gym x2 trials with RW and CG-SBA for safety. No LOB noted.     Therex:  Supine BLE therex 2x10 reps:    -AP    -SAQ   -Hip abd/add   -Bridges  Seated BLE therex 2x10 reps:    -AP   -LAQ   -hip flexion     Additional Treatment:  -recumbent stepper x16 min L3    Patient left up in chair with call button in reach and nrusing notified.    Assessment:  Selma Lux is a 84 y.o. female with a medical diagnosis of <principal problem not specified>.  Pt tolerated treatment well, and will continue to benefit from PT services at this time. Continue with PT POC as indicated.    Rehab identified problem list/impairments: impaired endurance, impaired self care skills, impaired functional mobilty, gait instability, impaired balance, decreased upper extremity function    Rehab potential is good.    Activity tolerance: Good    Discharge  recommendations: home with home health(w/ light family assistance recomended)     Barriers to discharge: Inaccessible home environment, Decreased caregiver support    Equipment recommendations: bath bench     GOALS:   Multidisciplinary Problems     Physical Therapy Goals        Problem: Physical Therapy Goal    Goal Priority Disciplines Outcome Goal Variances Interventions   Physical Therapy Goal     PT, PT/OT Ongoing, Progressing     Description:  Goals to be met by: 2020     Patient will increase functional independence with mobility by performin. Supine to sit with Modified [place]  2. Sit to supine with Modified [place]  3. Rolling to Left and Right with Modified [place].  4. Sit to stand transfer with Supervision  5. Bed to chair transfer with Supervision using Rolling Walker  6. Gait  x 150 feet with Supervision using Rolling Walker.   7. Wheelchair propulsion x150 feet with Supervision using bilateral uppper extremities  8. Ascend/descend 4 stair with bilateral Handrails Supervision  9. Ascend/Descend 4 inch curb step with Stand-by Assistance using Rolling Walker.  10. Stand for 3 minutes with Stand-by Assistance using Rolling Walker while performing a dynamic standing activity  11. Pt will perform a car transfer w/ RW and SBA  12. Pt will walk over uneven surface (outside ramp) w/ RW and CGA  13. Pt will  object from floor in a standing position w/ RW, use of a reacher, and SPV                      PLAN:    Patient to be seen 5 x/week  to address the above listed problems via therapeutic activities, gait training, therapeutic exercises  Plan of Care expires: 20  Plan of Care reviewed with: patient    Alyssa Starks, PTA  2020

## 2020-03-30 NOTE — PT/OT/SLP PROGRESS
"Occupational Therapy  Treatment    Selma Lux   MRN: [id number]   Admitting Diagnosis: <principal problem not specified>    OT Date of Treatment: 03/30/20       Billable Minutes:  Therapeutic Activity 42    General Precautions: Standard, fall  Orthopedic Precautions: N/A  Braces: N/A    Spiritual, Cultural Beliefs, Faith Practices, Values that Affect Care: no    Subjective:  Communicated with patient prior to session.  "I don't feel as good as I felt yesterday."    Pain/Comfort  Pain Rating 1: 0/10  Pain Rating Post-Intervention 1: 0/10    Objective:   Pt found in w/c with MD    Occupational Performance:    Bed Mobility:      Functional Mobility/Transfers:  · Patient completed Sit <> Stand Transfer with stand by assistance  with  no assistive device and rolling walker     Activities of Daily Living:  · Upper Body Dressing: stand by assistance pt self adjusted jacket in standing with SBA and no AD    AMPA 6 Click:  [facility] Total Score: 19    OT Exercises: AROM Pt completed UE dowel exercises 1# 3x10 as tolerated : Sh flexion to 90*, and bicep curls  UE Ergometer x10 min  Pt performed therex to increase strength and endurance required for independence and safety with ADLs, transfers, and mobility.    Additional Treatment:  Pt performed functional activities in standing with RW and SBA to increase balance and standing tolerance for ADLs and IADLs.  Pt instructed and demonstrated understanding in pursed lip breathing technique to address SOB with activity.      Patient left up in chair with rehab tech     ASSESSMENT:  Selma Lux is a 84 y.o. female with a medical diagnosis of <principal problem not specified>. Pt displays decreased endurance for activities in standing. Patient will benefit from continued OT services to progress toward goals and promote independence.      Rehab identified problem list/impairments: weakness, impaired endurance, impaired self care skills, impaired functional mobilty, " gait instability, impaired balance, decreased upper extremity function, decreased ROM    Rehab potential is good    Activity tolerance: Good    Discharge recommendations: home with home health     Barriers to discharge: Decreased caregiver support, Inaccessible home environment     Equipment recommendations: bath bench     GOALS:   Multidisciplinary Problems     Occupational Therapy Goals        Problem: Occupational Therapy Goal    Goal Priority Disciplines Outcome Interventions   Occupational Therapy Goal     OT, PT/OT Ongoing, Progressing    Description:  Goals to be met by:  4/8/2020    Patient will increase functional independence with ADLs by performing:    UE Dressing with Modified [place].  LE Dressing with Modified [place].  Grooming while standing at sink with Modified [place].  Toileting from toilet with Modified [place] for hygiene and clothing management.   Bathing from shower chair/bench with Modified [place].  Supine to sit with Modified [place] with HOB flat and no handrails.  Stand pivot transfers with Modified [place].  Toilet transfer to toilet with Modified [place].  Upper extremity exercise program 3 x 15 reps, with independence.  Patient will complete a dynamic standing activity for 8 min in order to perform household tasks.                      Plan:  Patient to be seen 5 x/week to address the above listed problems via self-care/home management, therapeutic activities, therapeutic exercises  Plan of Care expires: 04/28/20  Plan of Care reviewed with: patient    MELECIO Vera  03/30/2020           The AYAD and ADE have collaborated and discussed the patient's status, treatment plan and progress toward established goals.   MELECIO Vera 3/30/2020

## 2020-03-31 PROCEDURE — 97116 GAIT TRAINING THERAPY: CPT | Mod: CQ

## 2020-03-31 PROCEDURE — 97110 THERAPEUTIC EXERCISES: CPT | Mod: CQ

## 2020-03-31 PROCEDURE — 97530 THERAPEUTIC ACTIVITIES: CPT

## 2020-03-31 PROCEDURE — 11000004 HC SNF PRIVATE

## 2020-03-31 PROCEDURE — 97803 MED NUTRITION INDIV SUBSEQ: CPT

## 2020-03-31 PROCEDURE — 97530 THERAPEUTIC ACTIVITIES: CPT | Mod: CQ

## 2020-03-31 PROCEDURE — 25000003 PHARM REV CODE 250: Performed by: INTERNAL MEDICINE

## 2020-03-31 PROCEDURE — 63600175 PHARM REV CODE 636 W HCPCS: Performed by: INTERNAL MEDICINE

## 2020-03-31 PROCEDURE — 97535 SELF CARE MNGMENT TRAINING: CPT

## 2020-03-31 RX ORDER — ASPIRIN 81 MG/1
81 TABLET ORAL
Status: DISCONTINUED | OUTPATIENT
Start: 2020-04-01 | End: 2020-04-15 | Stop reason: HOSPADM

## 2020-03-31 RX ORDER — LANOLIN ALCOHOL/MO/W.PET/CERES
400 CREAM (GRAM) TOPICAL
Status: DISCONTINUED | OUTPATIENT
Start: 2020-04-01 | End: 2020-04-15 | Stop reason: HOSPADM

## 2020-03-31 RX ORDER — TALC
9 POWDER (GRAM) TOPICAL NIGHTLY
Status: DISCONTINUED | OUTPATIENT
Start: 2020-03-31 | End: 2020-04-15 | Stop reason: HOSPADM

## 2020-03-31 RX ORDER — THIAMINE HCL 100 MG
100 TABLET ORAL
Status: DISCONTINUED | OUTPATIENT
Start: 2020-04-01 | End: 2020-04-15 | Stop reason: HOSPADM

## 2020-03-31 RX ORDER — ATORVASTATIN CALCIUM 20 MG/1
40 TABLET, FILM COATED ORAL NIGHTLY
Status: DISCONTINUED | OUTPATIENT
Start: 2020-04-01 | End: 2020-04-15 | Stop reason: HOSPADM

## 2020-03-31 RX ORDER — PREDNISONE 20 MG/1
20 TABLET ORAL
Status: DISCONTINUED | OUTPATIENT
Start: 2020-04-01 | End: 2020-04-15 | Stop reason: HOSPADM

## 2020-03-31 RX ORDER — PANTOPRAZOLE SODIUM 20 MG/1
20 TABLET, DELAYED RELEASE ORAL
Status: DISCONTINUED | OUTPATIENT
Start: 2020-04-01 | End: 2020-04-15 | Stop reason: HOSPADM

## 2020-03-31 RX ORDER — AMOXICILLIN 250 MG
1 CAPSULE ORAL
Status: DISCONTINUED | OUTPATIENT
Start: 2020-04-01 | End: 2020-04-15 | Stop reason: HOSPADM

## 2020-03-31 RX ADMIN — Medication 400 MG: at 08:03

## 2020-03-31 RX ADMIN — ASPIRIN 81 MG: 81 TABLET, COATED ORAL at 08:03

## 2020-03-31 RX ADMIN — HYDROXYCHLOROQUINE SULFATE 200 MG: 200 TABLET, FILM COATED ORAL at 08:03

## 2020-03-31 RX ADMIN — FUROSEMIDE 40 MG: 40 TABLET ORAL at 08:03

## 2020-03-31 RX ADMIN — ATORVASTATIN CALCIUM 40 MG: 20 TABLET, FILM COATED ORAL at 08:03

## 2020-03-31 RX ADMIN — ENOXAPARIN SODIUM 40 MG: 100 INJECTION SUBCUTANEOUS at 04:03

## 2020-03-31 RX ADMIN — Medication 100 MG: at 08:03

## 2020-03-31 RX ADMIN — Medication 9 MG: at 09:03

## 2020-03-31 RX ADMIN — HYDROXYCHLOROQUINE SULFATE 200 MG: 200 TABLET, FILM COATED ORAL at 09:03

## 2020-03-31 RX ADMIN — MONTELUKAST 10 MG: 10 TABLET, FILM COATED ORAL at 09:03

## 2020-03-31 RX ADMIN — BACLOFEN 10 MG: 10 TABLET ORAL at 08:03

## 2020-03-31 RX ADMIN — MYCOPHENOLATE MOFETIL 500 MG: 250 CAPSULE ORAL at 08:03

## 2020-03-31 RX ADMIN — SENNOSIDES AND DOCUSATE SODIUM 1 TABLET: 8.6; 5 TABLET ORAL at 08:03

## 2020-03-31 RX ADMIN — HYDROCODONE BITARTRATE AND ACETAMINOPHEN 1 TABLET: 5; 325 TABLET ORAL at 09:03

## 2020-03-31 RX ADMIN — PREDNISONE 20 MG: 20 TABLET ORAL at 08:03

## 2020-03-31 RX ADMIN — MYCOPHENOLATE MOFETIL 500 MG: 250 CAPSULE ORAL at 09:03

## 2020-03-31 RX ADMIN — PANTOPRAZOLE SODIUM 20 MG: 20 TABLET, DELAYED RELEASE ORAL at 08:03

## 2020-03-31 RX ADMIN — AMLODIPINE BESYLATE 10 MG: 10 TABLET ORAL at 08:03

## 2020-03-31 RX ADMIN — BACLOFEN 10 MG: 10 TABLET ORAL at 09:03

## 2020-03-31 NOTE — PLAN OF CARE
Goals addressed and unmet.  Cont with POC  Beryl Matos OT  3/31/2020    Problem: Occupational Therapy Goal  Goal: Occupational Therapy Goal  Description  Goals to be met by:  4/8/2020    Patient will increase functional independence with ADLs by performing:    UE Dressing with Modified [place].  LE Dressing with Modified [place].  Grooming while standing at sink with Modified [place].  Toileting from toilet with Modified [place] for hygiene and clothing management.   Bathing from shower chair/bench with Modified [place].  Supine to sit with Modified [place] with HOB flat and no handrails.  Stand pivot transfers with Modified [place].  Toilet transfer to toilet with Modified [place].  Upper extremity exercise program 3 x 15 reps, with independence.  Patient will complete a dynamic standing activity for 8 min in order to perform household tasks.     Outcome: Ongoing, Progressing

## 2020-03-31 NOTE — PLAN OF CARE
Recommendation: Rec'd 2gm Na diet; FR per MD.  Goals: Meet > 85 % EEN/EPN by RD f/u   Nutrition Goal Status: goal met  Communication of RD Recs: other (comment)(POC)      Problem: Adult Inpatient Plan of Care  Goal: Plan of Care Review  Outcome: Ongoing, Progressing

## 2020-03-31 NOTE — PT/OT/SLP PROGRESS
"Occupational Therapy  Treatment    Selma Lux   MRN: [id number]   Admitting Diagnosis: <principal problem not specified>    OT Date of Treatment: 03/31/20       Billable Minutes:  Self Care/Home Management 15 and Therapeutic Exercise 30    General Precautions: Standard, fall  Orthopedic Precautions: N/A  Braces: N/A    Spiritual, Cultural Beliefs, Gnosticist Practices, Values that Affect Care: no    Subjective:  Communicated with RN prior to session.  "I will do whatever you ask me to do"    Pain/Comfort  Pain Rating 1: 0/10    Objective:       Occupational Performance:    Bed Mobility:    · Pt presented in chair     Functional Mobility/Transfers:  · Patient completed Sit <> Stand Transfer with contact guard assistance  with  hand-held assist   · Patient completed WheelChair <> Chair  Stand Pivot technique with contact guard assistance with hand-held assist    Activities of Daily Living:  · Grooming: modified independence set up  · Upper Body Dressing: contact guard assistance      [facility] 6 Click:  [facility] Total Score: 19    OT Exercises:   25 forward punches, 25 upward punches, 15 min on arm bike    Additional Treatment:  .- transfer training  - Pt educated on safety, role of OT, importance of increased participation in self care for gains , expectations for participation, expectations for gains, POC, energy conservation, caregiver strain. White board updated.       Patient left up in chair with all lines intact    ASSESSMENT:  Selma Lux is a 84 y.o. female with a medical diagnosis of <principal problem not specified> Pt presented up in chair and in good spirits.  Pt able to complete grooming and UE dressing as she desired to do therex in the gym to get out of her room.  Pt with good participation and with continued benefit from skilled OT for gains.    Rehab identified problem list/impairments: weakness, impaired endurance, impaired self care skills, impaired functional mobilty, gait instability, " impaired balance, decreased upper extremity function, decreased ROM    Rehab potential is good    Activity tolerance: Good    Discharge recommendations: home with home health     Barriers to discharge: Decreased caregiver support, Inaccessible home environment     Equipment recommendations: bath bench     GOALS:   Multidisciplinary Problems     Occupational Therapy Goals        Problem: Occupational Therapy Goal    Goal Priority Disciplines Outcome Interventions   Occupational Therapy Goal     OT, PT/OT Ongoing, Progressing    Description:  Goals to be met by:  4/8/2020    Patient will increase functional independence with ADLs by performing:    UE Dressing with Modified [place].  LE Dressing with Modified [place].  Grooming while standing at sink with Modified [place].  Toileting from toilet with Modified [place] for hygiene and clothing management.   Bathing from shower chair/bench with Modified [place].  Supine to sit with Modified [place] with HOB flat and no handrails.  Stand pivot transfers with Modified [place].  Toilet transfer to toilet with Modified [place].  Upper extremity exercise program 3 x 15 reps, with independence.  Patient will complete a dynamic standing activity for 8 min in order to perform household tasks.                      Plan:  Patient to be seen 5 x/week to address the above listed problems via self-care/home management, therapeutic activities, therapeutic exercises  Plan of Care expires: 04/28/20  Plan of Care reviewed with: patient    Beryl Matos, OT  03/31/2020

## 2020-03-31 NOTE — PROGRESS NOTES
"[facility]  Adult Nutrition  Progress Note    SUMMARY       Recommendations    Recommendation: Rec'd 2gm Na diet; FR per MD.  Goals: Meet > 85 % EEN/EPN by RD f/u   Nutrition Goal Status: goal met  Communication of RD Recs: other (comment)(POC)    Reason for Assessment    Reason For Assessment: RD follow-up  Diagnosis: (respiratory failure)  Relevant Medical History: Anemia, CKD stage 3, HTN, Stroke, vascular dementia   Interdisciplinary Rounds: did not attend  General Information Comments:  3/31/30: RD completing follow up remotely to limit pt contact due to COVID-19 precautions. Per chart pt has a good appetite; PO intake 100% of meals. No N/V/D/C noted. Pt with 5# wt loss x 3 days, possibly fluid losses.  3/28/20: RD covering remotely. No answer to patient's room. Pt currently on regular diet. No known intake since admit. Per epic records, wt fluctuations noted. NFPE not performed d/t remote coverage.  Nutrition Discharge Planning: Adequate PO intake of regular diet         Nutrition Risk Screen    Nutrition Risk Screen: no indicators present    Nutrition/Diet History    Spiritual, Cultural Beliefs, Scientologist Practices, Values that Affect Care: yes  Food Allergies: NKFA  Factors Affecting Nutritional Intake: None identified at this time    Anthropometrics    Temp: 96.9 °F (36.1 °C)  Height Method: Stated  Height: 5' 4" (162.6 cm)  Height (inches): 64 in  Weight Method: Standard Scale  Weight: 79.1 kg (174 lb 6.1 oz)  Weight (lb): 174.39 lb  Ideal Body Weight (IBW), Female: 120 lb  % Ideal Body Weight, Female (lb): 145.32 %  BMI (Calculated): 29.9  BMI Grade: 25 - 29.9 - overweight  Usual Body Weight (UBW), k.36 kg(3/28/20)  % Usual Body Weight: 97.43  % Weight Change From Usual Weight: -2.78 %       Lab/Procedures/Meds    Pertinent Labs Reviewed: reviewed  Pertinent Labs Comments: Ca 8.3  Pertinent Medications Reviewed: reviewed  Pertinent Medications Comments: amlodipine, aspirin, " statin, baclofen, ergocalciferol, furosemide, hydrochloroquine, MgO, melatonin, montelukcast, mycophenolate, pantoprazole, predinsone, senna-docusate, thiamine, sulfamethoxazole-trimethoprim    Physical Findings/Assessment    Edwardo Score 19     Estimated/Assessed Needs    Weight Used For Calorie Calculations: 79.1 kg (174 lb 6.1 oz)  Energy Calorie Requirements (kcal): 1607 g/day  Energy Need Method: [place]-St Jeor(PAL 1.3)  Protein Requirements: 95 g/day(1.2 g/kg)  Weight Used For Protein Calculations: 79.1 kg (174 lb 6.1 oz)  Fluid Requirements (mL): 1 mL/kcal or per MD   Estimated Fluid Requirement Method: RDA Method  RDA Method (mL): 1607     Nutrition Prescription Ordered    Current Diet Order: Regular  Nutrition Order Comments: 1200mL FR    Evaluation of Received Nutrient/Fluid Intake    I/O: +399  Energy Calories Required: meeting needs  Protein Required: meeting needs  Fluid Required: meeting needs  Comments: LBM 3/30  Tolerance: tolerating  % Intake of Estimated Energy Needs: 75 - 100 %   % Meal Intake: 75 - 100 %     Nutrition Risk    Level of Risk/Frequency of Follow-up: low(1x/week)     Assessment and Plan    Nutrition Problem  Decreased nutrient needs (Na, fat)      Related to (etiology):   Past medical hx      Signs and Symptoms (as evidenced by):   HTN, stroke      Interventions  [place]/fat modified diet      Nutrition Diagnosis Status:   Continues    Monitor and Evaluation    Food and Nutrient Intake: energy intake, food and beverage intake  Food and Nutrient Adminstration: diet order  Knowledge/Beliefs/Attitudes: food and nutrition knowledge/skill  Anthropometric Measurements: weight, weight change, body mass index  Biochemical Data, Medical Tests and Procedures: electrolyte and renal panel, glucose/endocrine profile  Nutrition-Focused Physical Findings: overall appearance     Nutrition Follow-Up    RD Follow-up?: Yes

## 2020-03-31 NOTE — PT/OT/SLP PROGRESS
Physical Therapy  Treatment    Selma Lux   MRN: [id number]   Admitting Diagnosis: <principal problem not specified>    PT Received On: 03/31/20          9:46 am to 10:31 am  Total time: 45 min  Billable Minutes:  Gait Training 15, Therapeutic Activity 15 and Therapeutic Exercise 15    Treatment Type: Treatment  PT/PTA: PTA     PTA Visit Number: 2       General Precautions: Standard, fall  Orthopedic Precautions: N/A   Braces: N/A    Spiritual, Cultural Beliefs, Gnosticist Practices, Values that Affect Care: no    Subjective:  Communicated with Pt who is agreeable to PT session  Pt with c/o fatigue    Pain/Comfort  Pain Rating 1: 0/10  Pain Rating Post-Intervention 1: 0/10    Objective:  Patient found UIC with Patient found with: (UIC with no lines)     AM-PAC 6 CLICK MOBILITY  Total Score:18    Bed Mobility:  Sit>Supine:NT  Supine>Sit: NT    Transfers:  Sit<>Stand: CGA with RW  Stand Pivot Transfer: Nt  With vc's for hand placement    Gait:  GT with RW CGA 88ft and then 60ft with vc's for placement of AD, posture, dora, step length and safety     Wheelchair Mobility:  Patient propels w/c with B UE's and LE's 140ft with sup     Therex:  B LE AROM ther ex's while seated in w/c x3 sets x10 reps with vc's (AP's, LAQ's, hip flex, hip add/abd)    Patient left up in chair with call button in reach.    Assessme  Selma Lux is a 84 y.o. female with a medical diagnosis of <principal problem not specified>.  requiring assistance and verbal cues for transfers and gait to prevent falls due to weakness and fatigue.  Pt remains motivated to participate in PT session and will cont to benefit from skilled PT intervention.    Rehab identified problem list/impairments: impaired endurance, impaired self care skills, impaired functional mobilty, gait instability, impaired balance, decreased upper extremity function    Rehab potential is good.    Activity tolerance: Good    Discharge recommendations: home with home  health(w/ light family assistance recomended)     Barriers to discharge: Inaccessible home environment, Decreased caregiver support    Equipment recommendations: bath bench     GOALS:   Multidisciplinary Problems     Physical Therapy Goals        Problem: Physical Therapy Goal    Goal Priority Disciplines Outcome Goal Variances Interventions   Physical Therapy Goal     PT, PT/OT Ongoing, Progressing     Description:  Goals to be met by: 2020     Patient will increase functional independence with mobility by performin. Supine to sit with Modified [place]  2. Sit to supine with Modified [place]  3. Rolling to Left and Right with Modified [place].  4. Sit to stand transfer with Supervision  5. Bed to chair transfer with Supervision using Rolling Walker  6. Gait  x 150 feet with Supervision using Rolling Walker.   7. Wheelchair propulsion x150 feet with Supervision using bilateral uppper extremities  8. Ascend/descend 4 stair with bilateral Handrails Supervision  9. Ascend/Descend 4 inch curb step with Stand-by Assistance using Rolling Walker.  10. Stand for 3 minutes with Stand-by Assistance using Rolling Walker while performing a dynamic standing activity  11. Pt will perform a car transfer w/ RW and SBA  12. Pt will walk over uneven surface (outside ramp) w/ RW and CGA  13. Pt will  object from floor in a standing position w/ RW, use of a reacher, and SPV                      PLAN:    Patient to be seen 5 x/week  to address the above listed problems via therapeutic activities, gait training, therapeutic exercises  Plan of Care expires: 20  Plan of Care reviewed with: patient    Alma Delia SIERRA Len, PTA  2020

## 2020-03-31 NOTE — PLAN OF CARE
Problem: Wound  Goal: Optimal Wound Healing  Outcome: Ongoing, Progressing     Problem: Adult Inpatient Plan of Care  Goal: Plan of Care Review  Outcome: Ongoing, Progressing     Problem: Adult Inpatient Plan of Care  Goal: Patient-Specific Goal (Individualization)  Outcome: Ongoing, Progressing     Problem: Adult Inpatient Plan of Care  Goal: Absence of Hospital-Acquired Illness or Injury  Outcome: Ongoing, Progressing     Problem: Fall Injury Risk  Goal: Absence of Fall and Fall-Related Injury  Outcome: Ongoing, Progressing

## 2020-03-31 NOTE — H&P
Hospital Medicine  History and Physical Exam    Admit Date: 3/27/2020  LETICIA TBD  Principal Problem:  <principal problem not specified>   Primary care Physician: Bhargav Hirsch MD  Code status: Full Code    HPI:   Selma Lux is a 84 y.o. female with hx of HFrEF, COPD, Cryptogenic organizing pneumonia on chronic prednisone, RA on plaquenil and cellcept, HTN, HPLD, TIA, vascular dementia, pulmonary HTN secondary to ILD, Long QT interval presenting for generalized weakness and intermittent mechanical falls for the past 2 weeks piror to admisison. 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 She states that her PT was commenting on how much weaker she had gotten as well. The patient does complain of not being able to do the things she would like to anymore and that she was probably going to see a psychiatrist soon. Patient is on valium, gabapentin, and baclofen. Per patients daughter she only takes valium and higher dose of gabapentin at night for sleep. Baclofen is only taken once a day now, per her PCP the last time she saw her in the clinic. In ED, CXR with infiltrates and UA concerning for UTI. Medicine called for admission.      Hospital Course:   Admitted to hospital medicine for acute metabolic encephalopathy suspected secondary to UTI and CAP. Obtained urine and started on BSabx. Unfortunately for blood cultures not obtained, ordered, NGTD. Urine with E Coli. Hypoglycemia noted, suspecting from infection and poor po intake. Since admission patient has gradually improved with supportive care. Changed IV abx to PO. PT OT rec SNF, plan was discharge to SNF. Unfortunately 3/2 morning patient was found to be hypoxic Sp02 88% and lethargic. Stat ABG and CTA ordered. No piror hx of FREEDOM. Respiratory panel negative. ABG with respiratory alkalosis. CTA with no PE but  noting worsening consolidations, will consult pulmonary. Restarted BSabx. Pulmonary offered bronchoscopy due to hx of , however patient/daughter declined at this time. Plan to continue BSabx for now, and now increased home steroids as per pulmonary. De-escalated abx to cefepime for total 7 days as per pulmonary. Plan to continue high dose steroids for a few weeks before beginning to taper off. Started on bactrim MWF while she remains of >20mg prednisone per day. PT OT continue to work with patient. Patient finished her 7 day course of cefepime. Resumed her cellcept. Stable for discharge to SNF. She will need to fu with PCP and pulmonary on discharge. Consider OP sleep study, will defer to pulmonary.     Patient transferred to [facility] with PT and OT to improve functional status and ability to perform ADLs.       Past Medical History: Patient has a past medical history of Acute hypoxemic respiratory failure (4/11/2018), Altered mental status, Anemia, Arthritis, Bilateral hand pain (3/14/2018), Branch retinal vein occlusion, left eye (2/20/2015), Chronic bilateral low back pain without sciatica (3/23/2017), Chronic renal failure in pediatric patient, stage 3 (moderate) (4/15/2018), Cognitive communication deficit (12/19/2017), Cystoid macular edema, left eye (2/20/2015), Cystoid macular edema, left eye (2/20/2015), DJD (degenerative joint disease) of cervical spine (5/15/2013), Fatty liver (8/26/2014), Goiter (4/9/2018), Hashimoto's disease, Hemichorea (8/23/2017), Hypertension, Hypertensive encephalopathy, IBS (irritable bowel syndrome) (6/21/2017), IGT (impaired glucose tolerance) (1/12/2016), Iron deficiency anemia secondary to inadequate dietary iron intake (6/24/2013), Joint pain, Keratoconjunctivitis sicca of both eyes not specified as Sjogren's (7/29/2016), Leiomyoma of uterus, unspecified (9/16/2014), Long QT interval (6/29/2016), Macular edema (1/12/2015), Multinodular goiter (1/12/2016), Neuropathy  (8/23/2017), Plaquenil causing adverse effect in therapeutic use (10/7/2016), Pneumococcal vaccine refused (8/17/2016), Pneumonia due to Streptococcus pneumoniae (4/5/2018), Primary osteoarthritis involving multiple joints (10/21/2015), 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    Past Surgical History: Patient has a past surgical history that includes Salivary gland surgery; Tonsillectomy; left parotidectomy; Cataract extraction; Colonoscopy (N/A, 9/29/2015); Joint replacement; Knee surgery (Left, 12/31/2013); Eye surgery; and Breast cyst excision.    Social History: Patient reports that she has never smoked. She has never used smokeless tobacco. She reports that she drinks alcohol. She reports that she does not use drugs.    Family History: family history includes Breast cancer in her maternal grandmother; Cancer in her father; Heart disease in her mother; Hypertension in her mother; Hyperthyroidism in her mother and other; Prostate cancer in her father.    Medications: reviewed     Allergies: Patient has No Known Allergies.    ROS  Constitutional: no fever or chills  Respiratory: no cough or shortness of breath  Cardiovascular: no chest pain or palpitations  Gastrointestinal: no nausea or vomiting, no abdominal pain or change in bowel habits  Genitourinary: no hematuria or dysuria  Integument/Breast: no rash or pruritis  Hematologic/Lymphatic: no easy bruising or lymphadenopathy  Musculoskeletal: no arthralgias or myalgias  Neurological: no seizures or tremors  Behavioral/Psych: no depression or anxiety    PEx  Temp:  [96.9 °F (36.1 °C)-98.2 °F (36.8 °C)]   Pulse:  [89-95]   Resp:  [18-20]   BP: (133-136)/(64-66)   SpO2:  [92 %-100 %]   Body mass index is 29.93 kg/m².     General  appearance: no distress  Mental status: Alert and oriented x 3  HEENT:  conjunctivae/corneas clear, PERRL  Neck: supple, thyroid not enlarged  Pulm:   normal respiratory effort, CTA B, no c/w/r  Card: RRR, no murmur  Abd: soft, NT, ND, BS present; no masses, no organomegaly  Ext: no edema  Pulses: 2+, symmetric  Skin: color, texture, turgor normal. No rashes or lesions  Neuro: CN II-XII grossly intact, no focal numbness or weakness, normal strength and tone     Recent Labs   Lab 03/30/20  0524      K 3.5      CO2 25   BUN 22   CREATININE 1.0   GLU 72   CALCIUM 8.3*   MG 1.9   PHOS 3.0     Recent Labs   Lab 03/30/20  0524   WBC 11.29   HGB 11.6*   HCT 39.6   PLT 95*   GRAN 56.4  6.4   LYMPH 23.0  2.6   MONO 18.3*  2.1*     Assessment and Plan:    Acute hypoxic respiratory failure  Community acquired pneumonia  Cryptogenic organizing pneumonia on chronic prednisone  Pulmonary HTN secondary to ILD  - On admit - CXR with bibasilar infiltrates however no fever or cough  - started on azithro and rocephin which was transitioned to keflex for UTI as patient didn't have any fever, dyspnea or cough  - 3/2 morning patient was found to be hypoxic Sp02 88% and lethargic.   - Stat ABG and CTA ordered. ABG with respiratory alkalosis  - CTA with worsening consolidations, restarted BSabx for now, and consulted pulmonary for therapy guidance considering her ILD and   - Bronchoscopy by pulm due to hx of , however patient/daughter declined  - Patient was attempted on CPAP overnight for concerns of underlying FREEDOM, and patient tolerated well with resultant good sleep as per daughter/patient. However she refused this overnight  - 3/4 AM hypoxic around mild low 80s, placed on HFNC, CXR and ABG reviewed, discussed with pulmonary. Increased home steroids, given one dose 80mg IV steroids once as per pulmonary and one dose 40mg IV lasix with good results  - Continue increased prednisone for now as per pulmonary, will need  to taper after continuing this for few weeks  - Bactrium as per pulmonary for PCP ppx (>20mg steroids daily)  - De-escalate abx to total 7 days of cefepime (last dose 3/9 - today)  - Restart MMF once off antibiotics   - When on MMF, will decrease to prednisone dose as per pulmonary   - Duonebs scheduled and supplemental oxygen PRN  - IS provided  - Plan to d/c to SNF after cepefime treatment, tuesday  - Will need pulmonary fu on discharge      Acute cystitis - resolved  - symptoms concerning for UTI with altered mentation  - UA positive for bacteria and WBC, leuk and nitrates  - given fluids and rocephin  - urine cx with e coli, changed ceftriaxone to keflex - finished tx  - bcx not taken in ED, ordered, NGTD   - afebrile and HDS     Hx of vascular dementia   Acute metabolic encephalopathy  - metabolic secondary to above  - she continued to have intermittent confusion at night, and early AM suspecting from underlying age/dementia  - Patient is on valium, gabapentin, and baclofen, likely contributed to this  - d/c home valium, decrease gabapentin dose, continue prn baclofen  - ABG with no hypercapnia     Generalized weakness  Multiple falls at home  - secondary to above issues  - PT OT rec SNF  - CM SW updated, they have sent referrals     HFrEF  - last TTE in 8/2019 with 40% EF  - appears compensated at this time  - repeat TTE with EF 55% and grade 1 DD, normal CVP     HTN  - hold home amlodipine for now     RA  - hold cellcept for now due to infection  - continue home plaquenil and steroids  - resume cellcept once off abx     HLD  - continue statin     Leukocytosis  - suspected reactive to above and from steroid use     Long QT interval   - last qtC 311 on 2/27/2020     Hypomagnesemia - resolved  - replete and recheck      Hypokalemia - resolved   - replete and recheck         Prophylaxis- lovenox  Code Status- Full Code   Discharge plan and follow up - d/c to SNF as per PT OT once medically stable    Continue the  following medications for treatment of the indicated conditions:  ·  Indication Medication Dose Route  Frequency       amLODIPine  10 mg Oral Daily    [START ON 4/1/2020] aspirin  81 mg Oral with lunch    [START ON 4/1/2020] atorvastatin  40 mg Oral QHS    baclofen  10 mg Oral BID    enoxparin  40 mg Subcutaneous Daily    ergocalciferol  50,000 Units Oral Q7 Days    furosemide  40 mg Oral Daily    hydroxychloroquine  200 mg Oral BID    [START ON 4/1/2020] magnesium oxide  400 mg Oral with lunch    melatonin  9 mg Oral Nightly    montelukast  10 mg Oral QHS    mycophenolate  500 mg Oral BID    [START ON 4/1/2020] pantoprazole  20 mg Oral Before breakfast    [START ON 4/1/2020] predniSONE  20 mg Oral with lunch    [START ON 4/1/2020] senna-docusate 8.6-50 mg  1 tablet Oral with lunch    sulfamethoxazole-trimethoprim 800-160mg  1 tablet Oral Every Mon, Wed, Fri    [START ON 4/1/2020] thiamine  100 mg Oral with lunch           Future Appointments   Date Time Provider Department Center   4/6/2020  1:00 PM Lonnie Rouse MD [facility] RHEUM [address]   4/20/2020  1:00 PM Bhargav Lee MD [facility] IM [address] PCW   4/22/2020  8:00 AM Demond Wolf MD [facility] CARDIO [address]   5/1/2020  1:30 PM Dee Thomson MD [facility]   10/28/2020  9:00 AM [facility] [place] USOP1 [facility] [place] USOUNDO [facility]   11/9/2020  1:30 PM Rashad Monroy MD [facility] ENDO8 [facility]           I certify that SNF services are required to be given on an inpatient basis because Selma Lux needs for skilled nursing care and/or skilled rehabilitation are required on a daily basis and such services can only practically be provided in a skilled nursing facility setting and are for an ongoing condition for which she received inpatient care in the hospital.     Time (minutes) spent in care of the patient including face-to-face contact and coordination of care while on unit: 40    Denita Acosta MD

## 2020-04-01 PROCEDURE — 25000003 PHARM REV CODE 250: Performed by: INTERNAL MEDICINE

## 2020-04-01 PROCEDURE — 97110 THERAPEUTIC EXERCISES: CPT | Mod: CO

## 2020-04-01 PROCEDURE — 97530 THERAPEUTIC ACTIVITIES: CPT | Mod: CQ

## 2020-04-01 PROCEDURE — 11000004 HC SNF PRIVATE

## 2020-04-01 PROCEDURE — 97116 GAIT TRAINING THERAPY: CPT | Mod: CQ

## 2020-04-01 PROCEDURE — 97530 THERAPEUTIC ACTIVITIES: CPT | Mod: CO

## 2020-04-01 PROCEDURE — 63600175 PHARM REV CODE 636 W HCPCS: Performed by: INTERNAL MEDICINE

## 2020-04-01 PROCEDURE — 97130 THER IVNTJ EA ADDL 15 MIN: CPT

## 2020-04-01 PROCEDURE — 97110 THERAPEUTIC EXERCISES: CPT | Mod: CQ

## 2020-04-01 PROCEDURE — 97129 THER IVNTJ 1ST 15 MIN: CPT

## 2020-04-01 PROCEDURE — 97535 SELF CARE MNGMENT TRAINING: CPT

## 2020-04-01 RX ADMIN — ENOXAPARIN SODIUM 40 MG: 100 INJECTION SUBCUTANEOUS at 06:04

## 2020-04-01 RX ADMIN — Medication 400 MG: at 02:04

## 2020-04-01 RX ADMIN — Medication 9 MG: at 10:04

## 2020-04-01 RX ADMIN — HYDROCODONE BITARTRATE AND ACETAMINOPHEN 1 TABLET: 5; 325 TABLET ORAL at 10:04

## 2020-04-01 RX ADMIN — MYCOPHENOLATE MOFETIL 500 MG: 250 CAPSULE ORAL at 10:04

## 2020-04-01 RX ADMIN — AMLODIPINE BESYLATE 10 MG: 10 TABLET ORAL at 08:04

## 2020-04-01 RX ADMIN — BACLOFEN 10 MG: 10 TABLET ORAL at 09:04

## 2020-04-01 RX ADMIN — BACLOFEN 10 MG: 10 TABLET ORAL at 10:04

## 2020-04-01 RX ADMIN — MYCOPHENOLATE MOFETIL 500 MG: 250 CAPSULE ORAL at 08:04

## 2020-04-01 RX ADMIN — PANTOPRAZOLE SODIUM 20 MG: 20 TABLET, DELAYED RELEASE ORAL at 06:04

## 2020-04-01 RX ADMIN — HYDROXYCHLOROQUINE SULFATE 200 MG: 200 TABLET, FILM COATED ORAL at 10:04

## 2020-04-01 RX ADMIN — STANDARDIZED SENNA CONCENTRATE AND DOCUSATE SODIUM 1 TABLET: 8.6; 5 TABLET ORAL at 02:04

## 2020-04-01 RX ADMIN — MONTELUKAST 10 MG: 10 TABLET, FILM COATED ORAL at 10:04

## 2020-04-01 RX ADMIN — Medication 100 MG: at 02:04

## 2020-04-01 RX ADMIN — ASPIRIN 81 MG: 81 TABLET, COATED ORAL at 02:04

## 2020-04-01 RX ADMIN — HYDROXYCHLOROQUINE SULFATE 200 MG: 200 TABLET, FILM COATED ORAL at 08:04

## 2020-04-01 RX ADMIN — SULFAMETHOXAZOLE AND TRIMETHOPRIM 1 TABLET: 800; 160 TABLET ORAL at 08:04

## 2020-04-01 RX ADMIN — PREDNISONE 20 MG: 20 TABLET ORAL at 02:04

## 2020-04-01 RX ADMIN — FUROSEMIDE 40 MG: 40 TABLET ORAL at 08:04

## 2020-04-01 RX ADMIN — HYDROCODONE BITARTRATE AND ACETAMINOPHEN 1 TABLET: 5; 325 TABLET ORAL at 08:04

## 2020-04-01 RX ADMIN — ATORVASTATIN CALCIUM 40 MG: 20 TABLET, FILM COATED ORAL at 10:04

## 2020-04-01 NOTE — PT/OT/SLP PROGRESS
"Physical Therapy  Treatment    Selma Lux   MRN: [id number]   Admitting Diagnosis: <principal problem not specified>    PT Received On: 04/01/20          10:54 am to 11:44 am  Total time: 50 min    Billable Minutes:  Gait Training 15, Therapeutic Activity 15 and Therapeutic Exercise 20    Treatment Type: Treatment  PT/PTA: PTA     PTA Visit Number: 3       General Precautions: Standard, fall  Orthopedic Precautions: N/A   Braces: N/A    Spiritual, Cultural Beliefs, Tenriism Practices, Values that Affect Care: no    Subjective:  Communicated with Pt who is agreeable to PT session  "I feel better now"    Pain/Comfort  Pain Rating 1: (no rating provided)  Location - Side 1: Left  Location - Orientation 1: anterior  Location 1: head  Pain Addressed 1: Pre-medicate for activity, Distraction  Pain Rating Post-Intervention 1: (no rating provided)    Objective:    2 attempts for tx session 2* pt with c/o HA at 10:00 am and requesting "minute to get myself together"     Patient found with: (UIC with no lines)     AM-PAC 6 CLICK MOBILITY  Total Score:17    Bed Mobility:  NP 2* pt found/left seated UIC    Transfers:  Sit<>Stand: from w/c with RW CGA x3 trials  vc's for hand placement and safety    Gait:  GT with RW CGA for balance and safety 80ft and then 130ft with vc's for placement of AD, posture, dora, step length and safety.  Pt demonstrates slight FFP, narrow LYNDSEY (especially when turning)    Advanced Gait:  Stairs: NP this session 2* fatigue and instability    Wheelchair Mobility:  Patient propels w/c with B UE's and B LE's with sup 140ft and vc's for speed of task     Therex:  B LE AROM ther ex's (AP's x20 reps x2 sets, LAQ's, hip flex, hip add/abd with manual resistance) seated in w/c x10 reps x3 sets with vc's    Patient left up in chair with call button in reach.    Assessment:  Selma Lux is a 84 y.o. female with a medical diagnosis of <principal problem not specified>.  requiring assistance and " verbal cues for transfers and gait to prevent falls due to fatigue and instability with narrow LYNDSEY.    Pt will cont to benefit from skilled PT intervention to address deficits and improve functional mobility. .    Rehab identified problem list/impairments: impaired endurance, impaired self care skills, impaired functional mobilty, gait instability, impaired balance, decreased upper extremity function    Rehab potential is good.    Activity tolerance: Fair    Discharge recommendations: home with home health(w/ light family assistance recomended)     Barriers to discharge: Inaccessible home environment, Decreased caregiver support    Equipment recommendations: bath bench     GOALS:   Multidisciplinary Problems     Physical Therapy Goals        Problem: Physical Therapy Goal    Goal Priority Disciplines Outcome Goal Variances Interventions   Physical Therapy Goal     PT, PT/OT Ongoing, Progressing     Description:  Goals to be met by: 2020     Patient will increase functional independence with mobility by performin. Supine to sit with Modified [place]  2. Sit to supine with Modified [place]  3. Rolling to Left and Right with Modified [place].  4. Sit to stand transfer with Supervision  5. Bed to chair transfer with Supervision using Rolling Walker  6. Gait  x 150 feet with Supervision using Rolling Walker.   7. Wheelchair propulsion x150 feet with Supervision using bilateral uppper extremities  8. Ascend/descend 4 stair with bilateral Handrails Supervision  9. Ascend/Descend 4 inch curb step with Stand-by Assistance using Rolling Walker.  10. Stand for 3 minutes with Stand-by Assistance using Rolling Walker while performing a dynamic standing activity  11. Pt will perform a car transfer w/ RW and SBA  12. Pt will walk over uneven surface (outside ramp) w/ RW and CGA  13. Pt will  object from floor in a standing position w/ RW, use of a reacher, and SPV                      PLAN:     Patient to be seen 5 x/week  to address the above listed problems via therapeutic activities, gait training, therapeutic exercises  Plan of Care expires: 04/27/20  Plan of Care reviewed with: patient    Alma Delia Harrington, PTA  04/01/2020

## 2020-04-01 NOTE — PT/OT/SLP PROGRESS
"Speech Language Pathology  Treatment    Selma Lux   MRN: [id number]   Admitting Diagnosis: <principal problem not specified>    Diet recommendations: Solid Diet Level: Regular  Liquid Diet Level: Thin Standard aspiration precautions    SLP Treatment Date: 04/01/20  Speech Start Time: 1150     Speech Stop Time: 1221     Speech Total (min): 31 min       TREATMENT BILLABLE MINUTES:  Speech Therapy Individual (cognitive therapy, 2 units) 23 and [facility] Care/Home Management Training 8    Has the patient been evaluated by SLP for swallowing? : No  Keep patient NPO?: No   General Precautions: Standard, fall  Current Respiratory Status: room air       Subjective:  "My job is to get better."         Objective:      Pt seen on 3rd attempt.  Pt was not feeling well on 1st attempt, then working with PT on 2nd attempt. Pt was feeling better and able to participate in therapy on 3rd attempt. Pt's paragraph level reading abilities were found to be WFL.  Pt used right/dominant hand to generate 3 written statements with mild attention impairments noted. Pt bety the face of a clock with proper placement of numbers and strategy to plan placement, but min cues for attention needed x 1. Pt completed a delayed memory task recalling 3/3 facts after a 2 minute delay given min cues.  Pt followed 3-step commands with 25% accuracy ind'ly/75% given moderate cues.  Pt indicated that she has been having difficulty with memory for 4-5 years, but feels she is currently not at her baseline.  Pt reports that her daughter manages her medications and reminds pt to take her medications.  Education provided to pt regarding role of SLP, memory strategies and exercises, auditory comprehension tasks, attention tasks, and SLP treatment plan and POC. Pt expressed understanding.     Assessment:  Selma Lux is a 84 y.o. female with a medical diagnosis of <principal problem not specified> and presents with cognitive-linguistic deficits. "     Discharge recommendations: Discharge Facility/Level of Care Needs: (tbd)     Goals:   Multidisciplinary Problems     SLP Goals        Problem: SLP Goal    Goal Priority Disciplines Outcome   SLP Goal     SLP Ongoing, Progressing   Description:  Speech Language Pathology Goals  Goals expected to be met by 4/6:  1. Pt will recall 3/3 words or facts after a 2 minute delay given min cues.   2. Pt will follow 3-step commands with 70% accuracy given mod cues.   3. Pt will complete moderate level problem solving tasks with 70% accuracy given min-mod cues.   4. Pt will complete mental manipulation tasks with 70% accuracy given mod cues.   5. Pt will complete sustained attention tasks with 70% accuracy given mod cues.   6. Pt will participate in further assessment of reading, writing, and visual spatial abilities.                              Plan:   Patient to be seen Therapy Frequency: 3 x/week  Planned Interventions: Cognitive-Linguistic Therapy  Plan of Care expires: 04/30/20  Plan of Care reviewed with: patient  SLP Follow-up?: Yes              XUAN Jean-Baptiste, YANE-SLP  04/01/2020     XUAN Jean-Baptiste, CCC-SLP  Speech Language Pathologist  [phone]  4/1/2020

## 2020-04-01 NOTE — PLAN OF CARE
Problem: SLP Goal  Goal: SLP Goal  Description  Speech Language Pathology Goals  Goals expected to be met by 4/6:  1. Pt will recall 3/3 words or facts after a 2 minute delay given min cues.   2. Pt will follow 3-step commands with 70% accuracy given mod cues.   3. Pt will complete moderate level problem solving tasks with 70% accuracy given min-mod cues.   4. Pt will complete mental manipulation tasks with 70% accuracy given mod cues.   5. Pt will complete sustained attention tasks with 70% accuracy given mod cues.   6. Pt will participate in further assessment of reading, writing, and visual spatial abilities.            Outcome: Ongoing, Progressing  Cont POC.   XUAN Jean-Baptiste, CCC-SLP  Speech Language Pathologist  [phone]  4/1/2020

## 2020-04-01 NOTE — PLAN OF CARE
Problem: Adult Inpatient Plan of Care  Goal: Plan of Care Review  Outcome: Ongoing, Progressing     Problem: Adult Inpatient Plan of Care  Goal: Patient-Specific Goal (Individualization)  Outcome: Ongoing, Progressing     Problem: Adult Inpatient Plan of Care  Goal: Optimal Comfort and Wellbeing  Outcome: Ongoing, Progressing     Problem: Fall Injury Risk  Goal: Absence of Fall and Fall-Related Injury  Outcome: Ongoing, Progressing

## 2020-04-01 NOTE — PT/OT/SLP PROGRESS
Occupational Therapy  Treatment    Selma Lux   MRN: [id number]   Admitting Diagnosis: <principal problem not specified>    OT Date of Treatment: 04/01/20       Billable Minutes:39  Therapeutic Activity 24 and Therapeutic Exercise 15    General Precautions: Standard, fall  Orthopedic Precautions: N/A  Braces: N/A    Spiritual, Cultural Beliefs, Lutheran Practices, Values that Affect Care: no    Subjective:  Communicated with nsg prior to session.  Pt. Agreeable to therapy session    Pain/Comfort  Pain Rating 1: 0/10  Pain Rating Post-Intervention 1: 0/10    Objective:   Pt. Found seated in w/c on arrival    Occupational Performance:    Bed Mobility:    · Not tested    Functional Mobility/Transfers:  · Patient completed Sit <> Stand Transfer with stand by assistance  with  rolling walker   · Pt. With fxl mobility around bed x3 trails with side stepping and use of gait belt       [facility] 6 Click:  [facility] Total Score: 19      Additional Treatment:  All therapeutic activities took place in room on this day    AROM using 2lb dowel, Pt. Completed 2 sets of 20 reps of SH overhead, chest press, SH hor abd/add, forward rows and biceps curls. Pt. With (A) with SH overhead motion on this day due to L shoulder pain. Pain not rated    Pt. With standing act on this day with task. Pt. With CGA/SBA for balance aspects with task with RW at raised bedside. Pt with home management task with folding laundry at bedside for  4 mins and 41 secs with standing bal and min cues throught out. Pt. Not able to complete finish task in standing. Pt. Completed task in seated position. Total activity time=10 mins. Gait belt applied with standing    Pt. Performed lateral side stepping around raised bed x2 to increase standing tolerance and balance. Pt. Unable to complete last round around bed. Pt. Required seated rest break. Activity terminated.     Pt. With standing and therex performed to increase ROM, endurance selfcare task and fxl mobility  for independence     Pt. Educated on safety and importance of increased participation in self care for gains and energy conservation.    Patient left up in chair with all lines intact, call button in reach and nurse present    ASSESSMENT:  Selma Lux is a 84 y.o. female with a medical diagnosis of <principal problem not specified> Pt. participated well with session on this day. Pt is progressing well with session on this day still continues to requires cues with aspects of safety . Pt. Will continue to benefit from continued OT to progress towards goals    Rehab identified problem list/impairments: weakness, impaired endurance, impaired self care skills, impaired functional mobilty, gait instability, impaired balance, decreased upper extremity function, decreased ROM    Rehab potential is fair    Activity tolerance: Fair    Discharge recommendations: home with home health     Barriers to discharge: Decreased caregiver support, Inaccessible home environment     Equipment recommendations: bath bench     GOALS:   Multidisciplinary Problems     Occupational Therapy Goals        Problem: Occupational Therapy Goal    Goal Priority Disciplines Outcome Interventions   Occupational Therapy Goal     OT, PT/OT Ongoing, Progressing    Description:  Goals to be met by:  4/8/2020    Patient will increase functional independence with ADLs by performing:    UE Dressing with Modified [place].  LE Dressing with Modified [place].  Grooming while standing at sink with Modified [place].  Toileting from toilet with Modified [place] for hygiene and clothing management.   Bathing from shower chair/bench with Modified [place].  Supine to sit with Modified [place] with HOB flat and no handrails.  Stand pivot transfers with Modified [place].  Toilet transfer to toilet with Modified [place].  Upper extremity exercise program 3 x 15 reps, with independence.  Patient will complete a dynamic  standing activity for 8 min in order to perform household tasks.                  Plan:  Patient to be seen 5 x/week to address the above listed problems via self-care/home management, therapeutic activities, therapeutic exercises  Plan of Care expires: 04/28/20  Plan of Care reviewed with: patient    Britany Wilder, GENE MELO and ADE have discussed the above patients goals and status in collaboration with Plan of Care.  04/01/2020

## 2020-04-02 PROCEDURE — 97110 THERAPEUTIC EXERCISES: CPT | Mod: CO

## 2020-04-02 PROCEDURE — 97535 SELF CARE MNGMENT TRAINING: CPT | Mod: CO

## 2020-04-02 PROCEDURE — 97129 THER IVNTJ 1ST 15 MIN: CPT

## 2020-04-02 PROCEDURE — 25000003 PHARM REV CODE 250: Performed by: INTERNAL MEDICINE

## 2020-04-02 PROCEDURE — 11000004 HC SNF PRIVATE

## 2020-04-02 PROCEDURE — 63600175 PHARM REV CODE 636 W HCPCS: Performed by: INTERNAL MEDICINE

## 2020-04-02 PROCEDURE — 97530 THERAPEUTIC ACTIVITIES: CPT | Mod: CQ

## 2020-04-02 PROCEDURE — 97130 THER IVNTJ EA ADDL 15 MIN: CPT

## 2020-04-02 PROCEDURE — 97535 SELF CARE MNGMENT TRAINING: CPT

## 2020-04-02 PROCEDURE — 97116 GAIT TRAINING THERAPY: CPT | Mod: CQ

## 2020-04-02 RX ADMIN — HYDROCODONE BITARTRATE AND ACETAMINOPHEN 1 TABLET: 5; 325 TABLET ORAL at 02:04

## 2020-04-02 RX ADMIN — PANTOPRAZOLE SODIUM 20 MG: 20 TABLET, DELAYED RELEASE ORAL at 07:04

## 2020-04-02 RX ADMIN — MYCOPHENOLATE MOFETIL 500 MG: 250 CAPSULE ORAL at 10:04

## 2020-04-02 RX ADMIN — HYDROXYCHLOROQUINE SULFATE 200 MG: 200 TABLET, FILM COATED ORAL at 09:04

## 2020-04-02 RX ADMIN — HYDROXYCHLOROQUINE SULFATE 200 MG: 200 TABLET, FILM COATED ORAL at 10:04

## 2020-04-02 RX ADMIN — FUROSEMIDE 40 MG: 40 TABLET ORAL at 10:04

## 2020-04-02 RX ADMIN — BACLOFEN 10 MG: 10 TABLET ORAL at 10:04

## 2020-04-02 RX ADMIN — MYCOPHENOLATE MOFETIL 500 MG: 250 CAPSULE ORAL at 09:04

## 2020-04-02 RX ADMIN — BACLOFEN 10 MG: 10 TABLET ORAL at 09:04

## 2020-04-02 RX ADMIN — ATORVASTATIN CALCIUM 40 MG: 20 TABLET, FILM COATED ORAL at 09:04

## 2020-04-02 RX ADMIN — Medication 400 MG: at 02:04

## 2020-04-02 RX ADMIN — ENOXAPARIN SODIUM 40 MG: 100 INJECTION SUBCUTANEOUS at 06:04

## 2020-04-02 RX ADMIN — ASPIRIN 81 MG: 81 TABLET, COATED ORAL at 02:04

## 2020-04-02 RX ADMIN — MONTELUKAST 10 MG: 10 TABLET, FILM COATED ORAL at 09:04

## 2020-04-02 RX ADMIN — AMLODIPINE BESYLATE 10 MG: 10 TABLET ORAL at 10:04

## 2020-04-02 RX ADMIN — PREDNISONE 20 MG: 20 TABLET ORAL at 02:04

## 2020-04-02 RX ADMIN — Medication 100 MG: at 02:04

## 2020-04-02 NOTE — PT/OT/SLP PROGRESS
"Physical Therapy  Treatment    Selma Lux   MRN: [id number]   Admitting Diagnosis: <principal problem not specified>    PT Received On: 04/02/20        10:52 am to 11:38 am  Total time: 46    Billable Minutes:  Gait Training 26 and Therapeutic Activity 20    Treatment Type: Treatment  PT/PTA: PTA     PTA Visit Number: 4       General Precautions: Standard, fall  Orthopedic Precautions: N/A   Braces: N/A    Spiritual, Cultural Beliefs, Anabaptist Practices, Values that Affect Care: no    Subjective:  Communicated with Pt who is agreeable to PT session  "I just need to go to the restroom"    Pain/Comfort  Pain Rating 1: 0/10  Pain Rating Post-Intervention 1: 0/10    Objective:   Patient found with: (sup in bed with no lines)     AM-PAC 6 CLICK MOBILITY  Total Score:18    Bed Mobility:  Sit>Supine:NT  Supine>Sit: sup, exiting on the L side.  HOB flat, no use of bedrail    Transfers:  Sit<>Stand: from EOB/BSC over toilet seat and w/c with SBA with RW  Stand Pivot Transfer: NT  vc's for hand placement and safety    Gait: including 180* directional turns to the L and R  GT with RW CGA to SBA 15ft within room, 85ft and 125ft in gym area with vc's for placement of AD, posture, dora, step length and safety.  Pt demonstrates slight to moderate FFP, narrow LYNDSEY, B LE instability (but no LOB)     Advanced Gait:  Stairs: 4 steps with B HR CGA  Curb Step: 4" curb step with RW CGA and vc's     Wheelchair Mobility:  Patient propels w/c with B UE's and LE's sup 150     Patient left up in chair  at the nurse station.    Assessment:  Selma Lux is a 84 y.o. female with a medical diagnosis of <principal problem not specified>.  requiring assistance and verbal cues for transfers, gait and elevations to prevent falls due to weakness, instability and fatigue.   Pt remains motivated to participate in PT session and will cont to benefit from skilled PT intervention.  .    Rehab identified problem list/impairments: impaired " endurance, impaired self care skills, impaired functional mobilty, gait instability, impaired balance, decreased upper extremity function    Rehab potential is good.    Activity tolerance: Good    Discharge recommendations: home health PT     Barriers to discharge: Inaccessible home environment, Decreased caregiver support    Equipment recommendations: bath bench     GOALS:   Multidisciplinary Problems     Physical Therapy Goals        Problem: Physical Therapy Goal    Goal Priority Disciplines Outcome Goal Variances Interventions   Physical Therapy Goal     PT, PT/OT Ongoing, Progressing     Description:  Goals to be met by: 2020     Patient will increase functional independence with mobility by performin. Supine to sit with Modified [place]  2. Sit to supine with Modified [place]  3. Rolling to Left and Right with Modified [place].  4. Sit to stand transfer with Supervision  5. Bed to chair transfer with Supervision using Rolling Walker  6. Gait  x 150 feet with Supervision using Rolling Walker.   7. Wheelchair propulsion x150 feet with Supervision using bilateral uppper extremities  8. Ascend/descend 4 stair with bilateral Handrails Supervision  9. Ascend/Descend 4 inch curb step with Stand-by Assistance using Rolling Walker.  10. Stand for 3 minutes with Stand-by Assistance using Rolling Walker while performing a dynamic standing activity  11. Pt will perform a car transfer w/ RW and SBA  12. Pt will walk over uneven surface (outside ramp) w/ RW and CGA  13. Pt will  object from floor in a standing position w/ RW, use of a reacher, and SPV                      PLAN:    Patient to be seen 5 x/week  to address the above listed problems via therapeutic activities, gait training, therapeutic exercises  Plan of Care expires: 20  Plan of Care reviewed with: patient    Alma Delia SIERRA Len, PTA  2020

## 2020-04-02 NOTE — PLAN OF CARE
Problem: SLP Goal  Goal: SLP Goal  Description  Speech Language Pathology Goals  Goals expected to be met by 4/6:  1. Pt will recall 3/3 words or facts after a 2 minute delay given min cues.   2. Pt will follow 3-step commands with 70% accuracy given mod cues.   3. Pt will complete moderate level problem solving tasks with 70% accuracy given min-mod cues.   4. Pt will complete mental manipulation tasks with 70% accuracy given mod cues.   5. Pt will complete sustained attention tasks with 70% accuracy given mod cues.   6. Pt will participate in further assessment of reading, writing, and visual spatial abilities.            Outcome: Ongoing, Progressing  Cont POC.   XUAN Jean-Baptiste, CCC-SLP  Speech Language Pathologist  [phone]  4/2/2020

## 2020-04-02 NOTE — PT/OT/SLP PROGRESS
"Occupational Therapy  Treatment    Selma Lux   MRN: [id number]   Admitting Diagnosis: <principal problem not specified>    OT Date of Treatment: 04/02/20       Billable Minutes:42  Self Care/Home Management 15 and Therapeutic Exercise 27    General Precautions: Standard, fall  Orthopedic Precautions: N/A  Braces: N/A    Spiritual, Cultural Beliefs, Confucianism Practices, Values that Affect Care: no    Subjective:  Communicated with nsg prior to session.  Pt. Agreeable to therapy session    Pain/Comfort  Pain Rating 1: 0/10  Pain Rating Post-Intervention 1: 0/10    Objective:   Pt. Found seated EOB on arrival    Occupational Performance:    Bed Mobility:    · Not tested    Functional Mobility/Transfers:  · Patient completed Sit <> Stand Transfer with stand by assistance  with  rolling walker   · Patient completed Toilet Transfer Step Transfer technique with stand by assistance with  rolling walker and verbal cues for use of grab bars    Activities of Daily Living:  · Grooming: supervision with grooming aspects at sink level  · Upper Body Dressing: stand by assistance to doff gown and ligia pull over shirt at EOB   · Lower Body Dressing: stand by assistance to ligia pants over hips instance at EOB with RW. Pt. Able to ligia socks and shoes with fig 4 tech.  · Toileting: stand by assistance for hygiene and clothing management     [facility] 6 Click:  [facility] Total Score: 19      Additional Treatment:  Pt. BP checked during therapy session and reported to be 116/55 Pulse 97.    AROM using 1lb dowel, Pt. Completed 2 sets of 10 reps with RUE of SH overhead, chest press, forward rows and biceps curls.   AAROM for LUE was performed with Pt. To increase ROM. Pt. With complaints of "stiffness" in L shoulder     Pt. With standing act on this day with task. Pt. With CGA/SBA for balance aspects with task with RW at raised bedside. Pt with home management task with folding clothes at bedside x 5 min and 35 with standing bal and min " cues throught out. Pt. able to complete finish task but seated rest break required. Pt. Stood again for 2 mins and 36 secs to complete activity.     Pt. With standing and therex performed to increase ROM, endurance selfcare task and fxl mobility for independence     .  Patient left up in chair with all lines intact and call button in reach    ASSESSMENT:  Selma Lux is a 84 y.o. female with a medical diagnosis of <principal problem not specified> Pt. participated well with session on this day. Pt is progressing well with session on this day still continues to requires cues with aspects of safety . Pt. Will continue to benefit from continued OT to progress towards goals    Rehab identified problem list/impairments: weakness, impaired endurance, impaired self care skills, impaired functional mobilty, gait instability, impaired balance, decreased upper extremity function, decreased ROM    Rehab potential is fair    Activity tolerance: Fair    Discharge recommendations: home with home health     Barriers to discharge: Decreased caregiver support, Inaccessible home environment     Equipment recommendations: bath bench     GOALS:   Multidisciplinary Problems     Occupational Therapy Goals        Problem: Occupational Therapy Goal    Goal Priority Disciplines Outcome Interventions   Occupational Therapy Goal     OT, PT/OT Ongoing, Progressing    Description:  Goals to be met by:  4/8/2020    Patient will increase functional independence with ADLs by performing:    UE Dressing with Modified [place].  LE Dressing with Modified [place].  Grooming while standing at sink with Modified [place].  Toileting from toilet with Modified [place] for hygiene and clothing management.   Bathing from shower chair/bench with Modified [place].  Supine to sit with Modified [place] with HOB flat and no handrails.  Stand pivot transfers with Modified [place].  Toilet transfer to toilet with Modified  [place].  Upper extremity exercise program 3 x 15 reps, with independence.  Patient will complete a dynamic standing activity for 8 min in order to perform household tasks.                  Plan:  Patient to be seen 5 x/week to address the above listed problems via self-care/home management, therapeutic activities, therapeutic exercises  Plan of Care expires: 04/28/20  Plan of Care reviewed with: patient    Britany Wilder, GENE MELO and ADE have discussed the above patients goals and status in collaboration with Plan of Care.  04/02/2020

## 2020-04-02 NOTE — PT/OT/SLP PROGRESS
"Speech Language Pathology  Treatment    Selma Lux   MRN: [id number]   Admitting Diagnosis: <principal problem not specified>    Diet recommendations: Solid Diet Level: Regular  Liquid Diet Level: Thin Standard aspiration precautions    SLP Treatment Date: 04/02/20  Speech Start Time: 1007     Speech Stop Time: 1038     Speech Total (min): 31 min       TREATMENT BILLABLE MINUTES:  Speech Therapy Individual (cognitive therapy, 2 sessions) 23 and [place] Care/Home Management Training 8    Has the patient been evaluated by SLP for swallowing? : No  Keep patient NPO?: No   General Precautions: Standard, fall  Current Respiratory Status: room air       Subjective:  "It might be very well that I'm not in the right position for class." pt referring to decreased alertness and attention while laying in bed for tx.          Objective:      Pt seen for continued cognitive therapy while laying in bed. Pt awake, but closing eyes at time and attention lacking at times.  Pt O x 4.  Abstract convergent categorization tasks completed with 60% accuracy ind'ly/100% given cues.  Pt named an additional abstract category items with 80% accuracy ind'ly/100% given cues.  Analogies were completed with 90% accuracy ind'ly/100% given cues.  Mental manipulation/word progression tasks were completed with 40% accuracy indly/100% given cues for decreased attention.  Education provided to pt regarding categorization tasks, reasoning tasks, cognitive flexibility, and SLP POC.  Pt expressed understanding.     Assessment:  Selma Lux is a 84 y.o. female with a medical diagnosis of <principal problem not specified> and presents with cognitive-linguistic deficits.     Discharge recommendations: Discharge Facility/Level of Care Needs: (tbd)     Goals:   Multidisciplinary Problems     SLP Goals        Problem: SLP Goal    Goal Priority Disciplines Outcome   SLP Goal     SLP Ongoing, Progressing   Description:  Speech Language Pathology " Goals  Goals expected to be met by 4/6:  1. Pt will recall 3/3 words or facts after a 2 minute delay given min cues.   2. Pt will follow 3-step commands with 70% accuracy given mod cues.   3. Pt will complete moderate level problem solving tasks with 70% accuracy given min-mod cues.   4. Pt will complete mental manipulation tasks with 70% accuracy given mod cues.   5. Pt will complete sustained attention tasks with 70% accuracy given mod cues.   6. Pt will participate in further assessment of reading, writing, and visual spatial abilities.                              Plan:   Patient to be seen Therapy Frequency: 3 x/week  Planned Interventions: Cognitive-Linguistic Therapy  Plan of Care expires: 04/30/20  Plan of Care reviewed with: patient  SLP Follow-up?: Yes              XUAN Jean-Baptiste, CCC-SLP  04/02/2020     XUAN Jean-Baptiste, CCC-SLP  Speech Language Pathologist  [phone]  4/2/2020

## 2020-04-03 ENCOUNTER — PATIENT OUTREACH (OUTPATIENT)
Dept: ADMINISTRATIVE | Facility: OTHER | Age: 85
End: 2020-04-03

## 2020-04-03 PROCEDURE — 63600175 PHARM REV CODE 636 W HCPCS: Performed by: INTERNAL MEDICINE

## 2020-04-03 PROCEDURE — 97110 THERAPEUTIC EXERCISES: CPT | Mod: CQ

## 2020-04-03 PROCEDURE — 97110 THERAPEUTIC EXERCISES: CPT | Mod: CO

## 2020-04-03 PROCEDURE — 25000003 PHARM REV CODE 250: Performed by: INTERNAL MEDICINE

## 2020-04-03 PROCEDURE — 97530 THERAPEUTIC ACTIVITIES: CPT | Mod: CQ

## 2020-04-03 PROCEDURE — 97116 GAIT TRAINING THERAPY: CPT | Mod: CQ

## 2020-04-03 PROCEDURE — 11000004 HC SNF PRIVATE

## 2020-04-03 RX ADMIN — HYDROXYCHLOROQUINE SULFATE 200 MG: 200 TABLET, FILM COATED ORAL at 09:04

## 2020-04-03 RX ADMIN — ASPIRIN 81 MG: 81 TABLET, COATED ORAL at 10:04

## 2020-04-03 RX ADMIN — Medication 9 MG: at 09:04

## 2020-04-03 RX ADMIN — SULFAMETHOXAZOLE AND TRIMETHOPRIM 1 TABLET: 800; 160 TABLET ORAL at 10:04

## 2020-04-03 RX ADMIN — MYCOPHENOLATE MOFETIL 500 MG: 250 CAPSULE ORAL at 09:04

## 2020-04-03 RX ADMIN — STANDARDIZED SENNA CONCENTRATE AND DOCUSATE SODIUM 1 TABLET: 8.6; 5 TABLET ORAL at 10:04

## 2020-04-03 RX ADMIN — MONTELUKAST 10 MG: 10 TABLET, FILM COATED ORAL at 09:04

## 2020-04-03 RX ADMIN — AMLODIPINE BESYLATE 10 MG: 10 TABLET ORAL at 10:04

## 2020-04-03 RX ADMIN — Medication 100 MG: at 10:04

## 2020-04-03 RX ADMIN — HYDROXYCHLOROQUINE SULFATE 200 MG: 200 TABLET, FILM COATED ORAL at 10:04

## 2020-04-03 RX ADMIN — Medication 400 MG: at 10:04

## 2020-04-03 RX ADMIN — PREDNISONE 20 MG: 20 TABLET ORAL at 10:04

## 2020-04-03 RX ADMIN — BACLOFEN 10 MG: 10 TABLET ORAL at 10:04

## 2020-04-03 RX ADMIN — ATORVASTATIN CALCIUM 40 MG: 20 TABLET, FILM COATED ORAL at 09:04

## 2020-04-03 RX ADMIN — PANTOPRAZOLE SODIUM 20 MG: 20 TABLET, DELAYED RELEASE ORAL at 05:04

## 2020-04-03 RX ADMIN — MYCOPHENOLATE MOFETIL 500 MG: 250 CAPSULE ORAL at 10:04

## 2020-04-03 RX ADMIN — BACLOFEN 10 MG: 10 TABLET ORAL at 09:04

## 2020-04-03 RX ADMIN — FUROSEMIDE 40 MG: 40 TABLET ORAL at 10:04

## 2020-04-03 NOTE — CLINICAL REVIEW
Clinical Pharmacy Chart Review Note      Admit Date: 3/27/2020   LOS: 7 days       Selma Lux is a 84 y.o. female admitted to SNF for PT/OT after hospitalization for respiratory failure.    Active Hospital Problems    Diagnosis  POA    Respiratory failure [J96.90]  Yes      Resolved Hospital Problems   No resolved problems to display.     Review of patient's allergies indicates:  No Known Allergies  Patient Active Problem List    Diagnosis Date Noted    Respiratory failure 03/27/2020    Acute respiratory failure with hypoxia 03/02/2020    Acute cystitis without hematuria 02/27/2020    Arthritis 02/27/2020    Spinal stenosis 02/17/2020    Lumbar spondylosis 02/17/2020    Mood disorder 01/13/2020    Class 1 obesity due to excess calories with serious comorbidity in adult 01/13/2020    Primary osteoarthritis of left hip 11/22/2019    Thyroid antibody positive 11/13/2019    Cerebral microvascular disease 10/17/2019    Immunosuppressed status 10/17/2019    History of transient ischemic attack (TIA) 10/17/2019    Hyperlipidemia 10/17/2019    Post-traumatic osteoarthritis of both hips 09/17/2019    Pulmonary hypertension due to interstitial lung disease 03/14/2019    Cryptogenic organizing pneumonia 01/24/2019    Multinodular goiter 01/24/2019    Poor nutrition 12/23/2018    Thrombocytopenia 12/16/2018    Dystonia 12/16/2018    AF (paroxysmal atrial fibrillation) 06/29/2018    Aortic atherosclerosis 06/29/2018    Oropharyngeal dysphagia 04/26/2018    Chronic renal failure syndrome, stage 3 (moderate) 04/15/2018    History of stroke 12/12/2017    Vascular dementia 12/06/2017    Generalized weakness 11/08/2017    Long QT interval 06/29/2016    Hypertension, essential 06/16/2016    Hypovitaminosis D 02/03/2016    Seropositive rheumatoid arthritis of multiple sites 10/21/2015    Cystoid macular edema, left eye 02/20/2015    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    Submucous  leiomyoma of uterus 09/16/2014    PUD (peptic ulcer disease) 03/11/2014    Iron deficiency anemia secondary to inadequate dietary iron intake 06/24/2013       Scheduled Meds:    amLODIPine  10 mg Oral Daily    aspirin  81 mg Oral with lunch    atorvastatin  40 mg Oral QHS    baclofen  10 mg Oral BID    enoxparin  40 mg Subcutaneous Daily    ergocalciferol  50,000 Units Oral Q7 Days    furosemide  40 mg Oral Daily    hydroxychloroquine  200 mg Oral BID    magnesium oxide  400 mg Oral with lunch    melatonin  9 mg Oral Nightly    montelukast  10 mg Oral QHS    mycophenolate  500 mg Oral BID    pantoprazole  20 mg Oral Before breakfast    predniSONE  20 mg Oral with lunch    senna-docusate 8.6-50 mg  1 tablet Oral with lunch    sulfamethoxazole-trimethoprim 800-160mg  1 tablet Oral Every Mon, Wed, Fri    thiamine  100 mg Oral with lunch     Continuous Infusions:   PRN Meds: acetaminophen, dicyclomine, HYDROcodone-acetaminophen, ondansetron, polyethylene glycol    OBJECTIVE:     Vital Signs (Last 24H)  Temp:  [97.5 °F (36.4 °C)-98.1 °F (36.7 °C)]   Pulse:  []   Resp:  [18]   BP: (125-159)/(66-71)   SpO2:  [95 %-99 %]     Laboratory:  CBC:   Recent Labs   Lab 03/30/20 0524   WBC 11.29   RBC 4.45   HGB 11.6*   HCT 39.6   PLT 95*   MCV 89   MCH 26.1*   MCHC 29.3*     BMP:   Recent Labs   Lab 03/30/20  0524   GLU 72      K 3.5      CO2 25   BUN 22   CREATININE 1.0   CALCIUM 8.3*   MG 1.9     CMP:   Recent Labs   Lab 03/30/20  0524   GLU 72   CALCIUM 8.3*      K 3.5   CO2 25      BUN 22   CREATININE 1.0         ASSESSMENT/PLAN:     I have reviewed the medications in compliance with CMS Regulation F756 of the [place]. No irregularities were noted at this time.    Medications reviewed by PharmD, please re-consult if needed.        Alyssa Tolbert, Pharm. D.  Clinical Pharmacist  [facility]

## 2020-04-03 NOTE — PT/OT/SLP PROGRESS
"Physical Therapy  Treatment    Selma Lux   MRN: [id number]   Admitting Diagnosis: Respiratory Failure  PT Received On: 04/03/20        Billable Minutes:  Gait Training 15, Therapeutic Activity 15 and Therapeutic Exercise 15    Treatment Type: Treatment  PT/PTA: PTA     PTA Visit Number: 5       General Precautions: Standard, fall  Orthopedic Precautions: N/A   Braces: N/A    Spiritual, Cultural Beliefs, Hoahaoism Practices, Values that Affect Care: no    Subjective:  "just need to use the bathroom first"    Pain/Comfort  Pain Rating 1: 0/10  Pain Rating Post-Intervention 1: 0/10    Objective:   Patient found with: (in wc)     AM-PAC 6 CLICK MOBILITY  Total Score:18    Transfers:  Sit<>Stand: with RW SBA  Stand Pivot Transfer: SBA with grab bar WC<>BScommode    Gait:  Amb with RW CGA ~ 112 ft and 150 ft seated rest break no LOB, inc BUE support on RW noted    Advanced Gait:  Stairs: asc/jhonny 4steps with BHR CG/SBA  Curb Step: asc/jhonny 4 " curb with RW CG vcs for safety/tech    Wheelchair Mobility: declined 2* to should issues    Therex:  x20 reps AP,LAQ    Additional Treatment:  Mini elliptical x 10 min light resistance  toileting SBA with all aspects, trfs grab bar,clothing mgmt,marialuisa care,hand hygiene    Patient left up in chair with call button in reach and belonging sin reach.    Assessment:  Selma Lux is a 84 y.o. female with a medical diagnosis of   Respiratory Failure Pt tolerated well, pt would continue to benefit from skilled PT services to improve overall functional mobility, strength and endurance.  .    Rehab identified problem list/impairments: impaired endurance, impaired self care skills, impaired functional mobilty, gait instability, impaired balance, decreased upper extremity function    Rehab potential is good.    Activity tolerance: Fair    Discharge recommendations: home health PT     Barriers to discharge: Inaccessible home environment, Decreased caregiver support    Equipment " recommendations: bath bench     GOALS:   Multidisciplinary Problems     Physical Therapy Goals        Problem: Physical Therapy Goal    Goal Priority Disciplines Outcome Goal Variances Interventions   Physical Therapy Goal     PT, PT/OT Ongoing, Progressing     Description:  Goals to be met by: 2020     Patient will increase functional independence with mobility by performin. Supine to sit with Modified [place]  2. Sit to supine with Modified [place]  3. Rolling to Left and Right with Modified [place].  4. Sit to stand transfer with Supervision  5. Bed to chair transfer with Supervision using Rolling Walker  6. Gait  x 150 feet with Supervision using Rolling Walker.   7. Wheelchair propulsion x150 feet with Supervision using bilateral uppper extremities  8. Ascend/descend 4 stair with bilateral Handrails Supervision  9. Ascend/Descend 4 inch curb step with Stand-by Assistance using Rolling Walker.  10. Stand for 3 minutes with Stand-by Assistance using Rolling Walker while performing a dynamic standing activity  11. Pt will perform a car transfer w/ RW and SBA  12. Pt will walk over uneven surface (outside ramp) w/ RW and CGA  13. Pt will  object from floor in a standing position w/ RW, use of a reacher, and SPV                      PLAN:    Patient to be seen 5 x/week  to address the above listed problems via therapeutic activities, gait training, therapeutic exercises  Plan of Care expires: 20  Plan of Care reviewed with: patient    Kathy Chen, PTA  2020

## 2020-04-03 NOTE — PT/OT/SLP PROGRESS
Occupational Therapy  Treatment    Selma Lux   MRN: [id number]   Admitting Diagnosis: <principal problem not specified>    OT Date of Treatment: 04/03/20       Billable Minutes:48  Therapeutic Exercise 48    General Precautions: Standard, fall  Orthopedic Precautions: N/A  Braces: N/A    Spiritual, Cultural Beliefs, Nondenominational Practices, Values that Affect Care: no    Subjective:  Communicated with nsg prior to session.  Pt. Agreeable to therapy session    Pain/Comfort  Pain Rating 1: 4/10  Location - Side 1: Bilateral  Location - Orientation 1: generalized  Location 1: shoulder  Pain Addressed 1: Pre-medicate for activity  Pain Rating Post-Intervention 1: 4/10    Objective:   Pt. Found seated in w/c on arrival    Occupational Performance:    Bed Mobility:    · Not tested    Functional Mobility/Transfers:  · Patient completed Sit <> Stand Transfer with stand by assistance  with  rolling walker   · Pt. propel self in w/c to gym with use of BLEs.     Activities of Daily Living:  · Not tested    [facility] 6 Click:  [facility] Total Score: 19      Additional Treatment:  Pt. With standing act on this day with task. Pt. With CGA/SBA for balance aspects with task with no AD at raised counter Pt with visual perception task with discrimination of various shapes and sizes x 8 min with standing bal and min cues throught out. Pt. able to complete finish task but seated rest break required.    Pt. With 1# dowel activity with 2x10 reps with bicep curls horz adb/add and forward flex motion to increase BUE ROM and strength,.     Pt. With standing and therex performed to increase ROM, endurance selfcare task and fxl mobility for independence     Moist heat applied to Pt. BUEs prior to passive stretching.   Passive ROM of BUEs performed on Pt. in all planes of motion.    Patient left up in chair with all lines intact and call button in reach    ASSESSMENT:  Selma Lux is a 84 y.o. female with a medical diagnosis of <principal  problem not specified> Pt. participated well with session on this day. Pt is progressing well with session on this day still continues to requires cues with aspects of safety. Pt. Requested moist heat during therapy session. Moist heat applied to Pt. BUEs for 10 mins. Passive stretch occurred after moist heat. Pt. Will continue to benefit from continued OT to progress towards goals    Rehab identified problem list/impairments: weakness, impaired endurance, impaired self care skills, impaired functional mobilty, gait instability, impaired balance, decreased upper extremity function, decreased ROM    Rehab potential is fair    Activity tolerance: Fair    Discharge recommendations: home with home health     Barriers to discharge: Decreased caregiver support, Inaccessible home environment     Equipment recommendations: bath bench     GOALS:   Multidisciplinary Problems     Occupational Therapy Goals        Problem: Occupational Therapy Goal    Goal Priority Disciplines Outcome Interventions   Occupational Therapy Goal     OT, PT/OT Ongoing, Progressing    Description:  Goals to be met by:  4/8/2020    Patient will increase functional independence with ADLs by performing:    UE Dressing with Modified [place].  LE Dressing with Modified [place].  Grooming while standing at sink with Modified [place].  Toileting from toilet with Modified [place] for hygiene and clothing management.   Bathing from shower chair/bench with Modified [place].  Supine to sit with Modified [place] with HOB flat and no handrails.  Stand pivot transfers with Modified [place].  Toilet transfer to toilet with Modified [place].  Upper extremity exercise program 3 x 15 reps, with independence.  Patient will complete a dynamic standing activity for 8 min in order to perform household tasks.                  Plan:  Patient to be seen 5 x/week to address the above listed problems via self-care/home management,  therapeutic activities, therapeutic exercises  Plan of Care expires: 04/28/20  Plan of Care reviewed with: patient    Britany Wilder, GENE MELO and ADE have discussed the above patients goals and status in collaboration with Plan of Care.  04/03/2020

## 2020-04-04 PROCEDURE — 11000004 HC SNF PRIVATE

## 2020-04-04 PROCEDURE — 25000003 PHARM REV CODE 250: Performed by: INTERNAL MEDICINE

## 2020-04-04 PROCEDURE — 63600175 PHARM REV CODE 636 W HCPCS: Performed by: INTERNAL MEDICINE

## 2020-04-04 RX ADMIN — STANDARDIZED SENNA CONCENTRATE AND DOCUSATE SODIUM 1 TABLET: 8.6; 5 TABLET ORAL at 10:04

## 2020-04-04 RX ADMIN — ENOXAPARIN SODIUM 40 MG: 100 INJECTION SUBCUTANEOUS at 05:04

## 2020-04-04 RX ADMIN — Medication 400 MG: at 10:04

## 2020-04-04 RX ADMIN — MYCOPHENOLATE MOFETIL 500 MG: 250 CAPSULE ORAL at 09:04

## 2020-04-04 RX ADMIN — AMLODIPINE BESYLATE 10 MG: 10 TABLET ORAL at 10:04

## 2020-04-04 RX ADMIN — Medication 100 MG: at 12:04

## 2020-04-04 RX ADMIN — PANTOPRAZOLE SODIUM 20 MG: 20 TABLET, DELAYED RELEASE ORAL at 06:04

## 2020-04-04 RX ADMIN — ASPIRIN 81 MG: 81 TABLET, COATED ORAL at 10:04

## 2020-04-04 RX ADMIN — HYDROXYCHLOROQUINE SULFATE 200 MG: 200 TABLET, FILM COATED ORAL at 09:04

## 2020-04-04 RX ADMIN — BACLOFEN 10 MG: 10 TABLET ORAL at 10:04

## 2020-04-04 RX ADMIN — HYDROXYCHLOROQUINE SULFATE 200 MG: 200 TABLET, FILM COATED ORAL at 10:04

## 2020-04-04 RX ADMIN — BACLOFEN 10 MG: 10 TABLET ORAL at 09:04

## 2020-04-04 RX ADMIN — PREDNISONE 20 MG: 20 TABLET ORAL at 10:04

## 2020-04-04 RX ADMIN — MONTELUKAST 10 MG: 10 TABLET, FILM COATED ORAL at 09:04

## 2020-04-04 RX ADMIN — ERGOCALCIFEROL 50000 UNITS: 1.25 CAPSULE ORAL at 10:04

## 2020-04-04 RX ADMIN — Medication 9 MG: at 09:04

## 2020-04-04 RX ADMIN — MYCOPHENOLATE MOFETIL 500 MG: 250 CAPSULE ORAL at 10:04

## 2020-04-04 RX ADMIN — HYDROCODONE BITARTRATE AND ACETAMINOPHEN 1 TABLET: 5; 325 TABLET ORAL at 10:04

## 2020-04-04 RX ADMIN — FUROSEMIDE 40 MG: 40 TABLET ORAL at 10:04

## 2020-04-04 RX ADMIN — ATORVASTATIN CALCIUM 40 MG: 20 TABLET, FILM COATED ORAL at 09:04

## 2020-04-04 NOTE — PLAN OF CARE
POC reviewed with pt who verbalized understanding. AAOx4. VSS. Remains free of falls and injury. No acute events. No distress noted. Will continue to monitor.

## 2020-04-04 NOTE — PLAN OF CARE
Patient AAOx4. VSS. Patient remains injury free. Tolerating diet and PO well. Ambulate with walker and PT assistance. PO pain medication rec'd today x once. No distress noted. Patient condition stable at this time.

## 2020-04-05 PROCEDURE — 97530 THERAPEUTIC ACTIVITIES: CPT

## 2020-04-05 PROCEDURE — 97116 GAIT TRAINING THERAPY: CPT

## 2020-04-05 PROCEDURE — 25000003 PHARM REV CODE 250: Performed by: INTERNAL MEDICINE

## 2020-04-05 PROCEDURE — 63600175 PHARM REV CODE 636 W HCPCS: Performed by: INTERNAL MEDICINE

## 2020-04-05 PROCEDURE — 11000004 HC SNF PRIVATE

## 2020-04-05 PROCEDURE — 97110 THERAPEUTIC EXERCISES: CPT

## 2020-04-05 PROCEDURE — 97535 SELF CARE MNGMENT TRAINING: CPT

## 2020-04-05 RX ADMIN — MYCOPHENOLATE MOFETIL 500 MG: 250 CAPSULE ORAL at 09:04

## 2020-04-05 RX ADMIN — HYDROXYCHLOROQUINE SULFATE 200 MG: 200 TABLET, FILM COATED ORAL at 09:04

## 2020-04-05 RX ADMIN — BACLOFEN 10 MG: 10 TABLET ORAL at 09:04

## 2020-04-05 RX ADMIN — AMLODIPINE BESYLATE 10 MG: 10 TABLET ORAL at 09:04

## 2020-04-05 RX ADMIN — FUROSEMIDE 40 MG: 40 TABLET ORAL at 09:04

## 2020-04-05 RX ADMIN — HYDROCODONE BITARTRATE AND ACETAMINOPHEN 1 TABLET: 5; 325 TABLET ORAL at 09:04

## 2020-04-05 RX ADMIN — PREDNISONE 20 MG: 20 TABLET ORAL at 11:04

## 2020-04-05 RX ADMIN — ASPIRIN 81 MG: 81 TABLET, COATED ORAL at 11:04

## 2020-04-05 RX ADMIN — Medication 9 MG: at 09:04

## 2020-04-05 RX ADMIN — MONTELUKAST 10 MG: 10 TABLET, FILM COATED ORAL at 09:04

## 2020-04-05 RX ADMIN — ATORVASTATIN CALCIUM 40 MG: 20 TABLET, FILM COATED ORAL at 09:04

## 2020-04-05 RX ADMIN — Medication 100 MG: at 11:04

## 2020-04-05 RX ADMIN — STANDARDIZED SENNA CONCENTRATE AND DOCUSATE SODIUM 1 TABLET: 8.6; 5 TABLET ORAL at 11:04

## 2020-04-05 RX ADMIN — ENOXAPARIN SODIUM 40 MG: 100 INJECTION SUBCUTANEOUS at 06:04

## 2020-04-05 RX ADMIN — PANTOPRAZOLE SODIUM 20 MG: 20 TABLET, DELAYED RELEASE ORAL at 05:04

## 2020-04-05 RX ADMIN — Medication 400 MG: at 11:04

## 2020-04-05 NOTE — PT/OT/SLP PROGRESS
Physical Therapy  Treatment    Selma Lux   MRN: [id number]   Admitting Diagnosis: Respiratory Failure    PT Received On: 04/05/20        Billable Minutes:  Gait Training 15, Therapeutic Activity 25, Therapeutic Exercise 15 and Total Time 55    Treatment Type: Treatment  PT/PTA: PT     PTA Visit Number: 0       PT had a face-to-face encounter with regards to the patient's plan of care with the PTA who has been seeing this patient regularly.     General Precautions: Standard, fall  Orthopedic Precautions: N/A   Braces: N/A    Spiritual, Cultural Beliefs, Roman Catholic Practices, Values that Affect Care: no    Subjective:  Communicated with patient and nursing prior to session.  Pt agreeable to session.    Pain/Comfort  Pain Rating 1: 0/10  Pain Rating Post-Intervention 1: 0/10    Objective:  Patient found sitting in w/c.       AM-PAC 6 CLICK MOBILITY  Total Score:18    Bed Mobility:  Not performed    Transfers:  Sit<>Stand: to/from w/c, multiple trials, w/ RW and CGA/SBA for safety  Stand Pivot Transfer: to/from toilet w/ RW and CGA/SBA for safety    Gait:  Amb 120ft w/ RW and CGA for safety  Cues for upright posture and to remain inside RW     Advanced Gait:  Stairs: asc/jhonny 4 steps w/ BHR and CGA for safety  Cues to place entire foot on each step    Balance:  Static stand at sink w/ RW and CGA/SBA for safety, ~3min  Standing balance during LBD and toileting w/ CGA/SBA for safety    Additional Treatment:  Pt completed UBD/LBD (shirt, pants, socks, shoes) all w/ CGA/SBA for safety  Pt completed toileting w/ CGA for safety including SPT to/from toilet w/ RW and CGA/SBA, pericare in standing w/ RW and SBA, LBD in standing w/ RW and CGA    Recumbent cross , Level 3, x15min to inc overall strength/endurance    PT and pt spoke about DME recs including w/c. Pt in agreement. She already owns a RW, bedside commode, and transfer tub bench. Pt was also informed of the option to have her daughter come in for family  training prior to her D/C date. Rehab office number provided. Pt verb understanding and will speak to her daughter.    Patient left up in chair with call button in reach.    Assessment:  Selma Lux is a 84 y.o. female with a medical diagnosis of Respiratory Failure.  Pt kristina session well w/ good participation. She is progressing well w/ mobility towards goals but does remains at a CGA/SBA level for mobility due to overall weakness/instability. Pt will continue PT POC.    Rehab identified problem list/impairments: impaired endurance, impaired self care skills, impaired functional mobilty, gait instability, impaired balance, decreased upper extremity function    Rehab potential is good.    Activity tolerance: Good    Discharge recommendations: home health PT     Barriers to discharge: Inaccessible home environment, Decreased caregiver support    Equipment recommendations: wheelchair     GOALS:   Multidisciplinary Problems     Physical Therapy Goals        Problem: Physical Therapy Goal    Goal Priority Disciplines Outcome Goal Variances Interventions   Physical Therapy Goal     PT, PT/OT Ongoing, Progressing     Description:  Goals to be met by: 2020     Patient will increase functional independence with mobility by performin. Supine to sit with Modified [place]  2. Sit to supine with Modified [place]  3. Rolling to Left and Right with Modified [place].  4. Sit to stand transfer with Supervision  5. Bed to chair transfer with Supervision using Rolling Walker  6. Gait  x 150 feet with Supervision using Rolling Walker.   7. Wheelchair propulsion x150 feet with Supervision using bilateral uppper extremities  8. Ascend/descend 4 stair with bilateral Handrails Supervision  9. Ascend/Descend 4 inch curb step with Stand-by Assistance using Rolling Walker.  10. Stand for 3 minutes with Stand-by Assistance using Rolling Walker while performing a dynamic standing activity  11. Pt will perform  a car transfer w/ RW and SBA  12. Pt will walk over uneven surface (outside ramp) w/ RW and CGA  13. Pt will  object from floor in a standing position w/ RW, use of a reacher, and SPV                       PLAN:    Patient to be seen 5 x/week  to address the above listed problems via therapeutic activities, gait training, therapeutic exercises  Plan of Care expires: 04/27/20  Plan of Care reviewed with: patient    Marilyn M Shania, PT  04/05/2020

## 2020-04-05 NOTE — TREATMENT PLAN
Rehab Services' DME recommendations    Selma Lux  MRN: [id number]    [x] Wheelchair  Number of hours up in a wheelchair per day 8 hours/day       Style Light weight        Justification for light weight w/c: patient cannot propel in a standard wheelchair, patient can and does self-propel in a lightweight wheelchair, patient has impaired ability to participate in MRADLs, mobility limitations cannot be sifficiently resolved with a cane/walker, the home provides adequate access between rooms for a wheelchair, a wheelchair will significantly improve the ability to participate in MRADLs and will be used in the home on a regular basis, the patient is willing to use a wheelchair in the home and the patient has a caregiver who is available, willing, and able to provide assistance with the wheelchair    Seat Width 18 (Standard adult)    Seat Depth 18    Back Height Standard    Leg Support Standard, Heel Loops and Swing Away    Arm Height Full and Swing Away    Lap Belt none    Accessories Front Brakes and Anti-tippers    Cushion Basic    Justification for Cushion to prevent skin breakdown    Justification for wheelchair order: (Please select all that apply) Caregiver is capable and willing to operate wheelchair safely, Patient's upper body strength is sufficient for propulsion, The patient requires the use of a wheelchair for ADLs within the home and Patient mobility limitations cannot be sufficiently resolved by the use of other ambulatory therapies    [x] Home health PT, OT, MSW, Aide and Nurse    Marilyn Jauregui, PT 4/5/2020

## 2020-04-05 NOTE — PT/OT/SLP PROGRESS
"Occupational Therapy  Treatment    Selma Lux   MRN: [id number]   Admitting Diagnosis: <principal problem not specified>    OT Date of Treatment: 04/05/20       Billable Minutes:  Self Care/Home Management 45 and Therapeutic Activity 10    General Precautions: Standard, fall  Orthopedic Precautions: N/A  Braces: N/A    Spiritual, Cultural Beliefs, Zoroastrian Practices, Values that Affect Care: no    Subjective:  Communicated with nurse prior to session.  "I'm so glad to get in the shower today."    Pain/Comfort  Pain Rating 1: 0/10  Pain Rating Post-Intervention 1: 0/10    Objective:  Patient found with: (sitting EOB)    Occupational Performance:    Bed Mobility:    · N/A - sitting EOB upon OT arrival     Functional Mobility/Transfers:  · Patient completed Sit <> Stand Transfer with contact guard assistance  with  hand-held assist   · Patient completed Bed <> Chair Transfer using Step Transfer technique with contact guard assistance with hand-held assist  · Patient completed Toilet Transfer Step Transfer technique with stand by assistance and contact guard assistance with  bedside commode, grab bars and BSC over toilet - CGA to transfer from w/c -> toilet - SBA to transfer from toilet -> w/c  · Patient completed  Shower Transfer Step Transfer technique with contact guard assistance with grab bars and built in shower chair  · Functional Mobility: Pt able to perform sit to stand for LB dressing with SBA.    Activities of Daily Living:  · Feeding:  modified independence breakfast  · Grooming: modified independence washed face and brushed teeth at w/c level at sink  · Bathing: minimum assistance bathed in shower - A to wash feet only  · Upper Body Dressing: minimum assistance Min A to insert L arm all the way into sleeve - "Stuck" in sleeve  · Lower Body Dressing: contact guard assistance needed when standing to pull pants/underwear up  · Toileting: contact guard assistance CGA in standing during clothing mgmt and " hygiene    [facility] 6 Click:  [facility] Total Score: 20      Additional Treatment:  · Pt completed ADLs and func mobility activities for tx session as noted above  · Whiteboard updated  · Pt educated on role of OT and POC      Patient left up in chair with call button in reach    ASSESSMENT:  Selma Lux is a 84 y.o. female with a medical diagnosis of <principal problem not specified> and deficits noted below.  Pt was agreeable to OT and was noted to make progress towards her goals in therapy.  Pt's goals remain appropriate at this time.  She will continue to benefit from skilled OT services in order to assist her with increasing her safety and level of independence with self care and mobility tasks.       Rehab identified problem list/impairments: weakness, impaired endurance, impaired self care skills, impaired functional mobilty, gait instability, impaired balance, decreased upper extremity function, decreased ROM    Rehab potential is good    Activity tolerance: Good    Discharge recommendations: home with home health     Barriers to discharge: Decreased caregiver support, Inaccessible home environment     Equipment recommendations: bath bench     GOALS:   Multidisciplinary Problems     Occupational Therapy Goals        Problem: Occupational Therapy Goal    Goal Priority Disciplines Outcome Interventions   Occupational Therapy Goal     OT, PT/OT Ongoing, Progressing    Description:  Goals to be met by:  4/8/2020    Patient will increase functional independence with ADLs by performing:    UE Dressing with Modified [place].=MET 4/3/2020  LE Dressing with Modified [place].=MET 4/3/2020  Grooming while standing at sink with Modified [place].=MET 4/3/2020  Toileting from toilet with Modified [place] for hygiene and clothing management.   Bathing from shower chair/bench with Modified [place].  Supine to sit with Modified [place] with HOB flat and no handrails.  Stand pivot transfers  with Modified [place].  Toilet transfer to toilet with Modified [place].=MET 4/3/2020  Upper extremity exercise program 3 x 15 reps, with independence.  Patient will complete a dynamic standing activity for 8 min in order to perform household tasks.                       Plan:  Patient to be seen 3 x/week to address the above listed problems via self-care/home management, therapeutic activities, therapeutic exercises  Plan of Care expires: 04/28/20  Plan of Care reviewed with: patient    Jody Selby, OT  04/05/2020

## 2020-04-05 NOTE — PLAN OF CARE
Problem: Occupational Therapy Goal  Goal: Occupational Therapy Goal  Description  Goals to be met by:  4/8/2020    Patient will increase functional independence with ADLs by performing:    UE Dressing with Modified [place].=MET 4/3/2020  LE Dressing with Modified [place].=MET 4/3/2020  Grooming while standing at sink with Modified [place].=MET 4/3/2020  Toileting from toilet with Modified [place] for hygiene and clothing management.   Bathing from shower chair/bench with Modified [place].  Supine to sit with Modified [place] with HOB flat and no handrails.  Stand pivot transfers with Modified [place].  Toilet transfer to toilet with Modified [place].=MET 4/3/2020  Upper extremity exercise program 3 x 15 reps, with independence.  Patient will complete a dynamic standing activity for 8 min in order to perform household tasks.      Outcome: Ongoing, Progressing     Pt was agreeable to OT and was noted to make progress towards her goals in therapy.  Pt's goals remain appropriate at this time.  She will continue to benefit from skilled OT services in order to assist her with increasing her safety and level of independence with self care and mobility tasks.     Jody Selby, OT  4/5/2020

## 2020-04-06 PROCEDURE — 25000003 PHARM REV CODE 250: Performed by: INTERNAL MEDICINE

## 2020-04-06 PROCEDURE — 97530 THERAPEUTIC ACTIVITIES: CPT | Mod: CQ

## 2020-04-06 PROCEDURE — 97129 THER IVNTJ 1ST 15 MIN: CPT

## 2020-04-06 PROCEDURE — 97116 GAIT TRAINING THERAPY: CPT | Mod: CQ

## 2020-04-06 PROCEDURE — 97110 THERAPEUTIC EXERCISES: CPT | Mod: CQ

## 2020-04-06 PROCEDURE — 97130 THER IVNTJ EA ADDL 15 MIN: CPT

## 2020-04-06 PROCEDURE — 11000004 HC SNF PRIVATE

## 2020-04-06 PROCEDURE — 63600175 PHARM REV CODE 636 W HCPCS: Performed by: INTERNAL MEDICINE

## 2020-04-06 RX ADMIN — AMLODIPINE BESYLATE 10 MG: 10 TABLET ORAL at 10:04

## 2020-04-06 RX ADMIN — MYCOPHENOLATE MOFETIL 500 MG: 250 CAPSULE ORAL at 10:04

## 2020-04-06 RX ADMIN — HYDROCODONE BITARTRATE AND ACETAMINOPHEN 1 TABLET: 5; 325 TABLET ORAL at 10:04

## 2020-04-06 RX ADMIN — HYDROCODONE BITARTRATE AND ACETAMINOPHEN 1 TABLET: 5; 325 TABLET ORAL at 11:04

## 2020-04-06 RX ADMIN — BACLOFEN 10 MG: 10 TABLET ORAL at 10:04

## 2020-04-06 RX ADMIN — SULFAMETHOXAZOLE AND TRIMETHOPRIM 1 TABLET: 800; 160 TABLET ORAL at 10:04

## 2020-04-06 RX ADMIN — HYDROXYCHLOROQUINE SULFATE 200 MG: 200 TABLET, FILM COATED ORAL at 11:04

## 2020-04-06 RX ADMIN — Medication 400 MG: at 12:04

## 2020-04-06 RX ADMIN — ENOXAPARIN SODIUM 40 MG: 100 INJECTION SUBCUTANEOUS at 05:04

## 2020-04-06 RX ADMIN — ASPIRIN 81 MG: 81 TABLET, COATED ORAL at 12:04

## 2020-04-06 RX ADMIN — PANTOPRAZOLE SODIUM 20 MG: 20 TABLET, DELAYED RELEASE ORAL at 05:04

## 2020-04-06 RX ADMIN — Medication 9 MG: at 11:04

## 2020-04-06 RX ADMIN — Medication 100 MG: at 12:04

## 2020-04-06 RX ADMIN — BACLOFEN 10 MG: 10 TABLET ORAL at 11:04

## 2020-04-06 RX ADMIN — HYDROXYCHLOROQUINE SULFATE 200 MG: 200 TABLET, FILM COATED ORAL at 10:04

## 2020-04-06 RX ADMIN — ATORVASTATIN CALCIUM 40 MG: 20 TABLET, FILM COATED ORAL at 11:04

## 2020-04-06 RX ADMIN — MONTELUKAST 10 MG: 10 TABLET, FILM COATED ORAL at 11:04

## 2020-04-06 RX ADMIN — STANDARDIZED SENNA CONCENTRATE AND DOCUSATE SODIUM 1 TABLET: 8.6; 5 TABLET ORAL at 12:04

## 2020-04-06 RX ADMIN — PREDNISONE 20 MG: 20 TABLET ORAL at 12:04

## 2020-04-06 RX ADMIN — FUROSEMIDE 40 MG: 40 TABLET ORAL at 10:04

## 2020-04-06 NOTE — PT/OT/SLP PROGRESS
"Physical Therapy  Treatment    Selma Lux   MRN: [id number]   Admitting Diagnosis: Respiratory Failure  PT Received On: 04/06/20        Billable Minutes:  Gait Training 10, Therapeutic Activity 15 and Therapeutic Exercise 21    Treatment Type: Treatment  PT/PTA: PTA     PTA Visit Number: 1       General Precautions: Standard, fall  Orthopedic Precautions: N/A   Braces: N/A    Spiritual, Cultural Beliefs, Confucianist Practices, Values that Affect Care: no    Subjective:  "let me use the bathroom first"    Pain/Comfort  Pain Rating 1: 0/10  Pain Rating Post-Intervention 1: 0/10    Objective:   Patient found with: (in wc)     AM-PAC 6 CLICK MOBILITY  Total Score:18    Transfers:  Sit<>Stand: SBA with RW  Stand Pivot Transfer: SBA/S with grab bar    Gait:  Amb with RW CGA ~ 125 ft no LOB vcs for dec step length and inc [id number]     Advanced Gait:  Stairs: asc/jhonny 4 steps with BHR SBA  Curb Step: asc/jhonny 4" curb CGA with RW    Therex: plan standing exs tomorrow  2x10 reps AP,LAQ,hip flex     Additional Treatment:  Mini elliptical x 15 min light resistance  toileting SBA/S with all aspects    Patient left up in chair with call button in reach and belongings in reach.    Assessment:  Selma Lux is a 84 y.o. female with a medical diagnosis ofRespiratory Failure   Pt tolerated well, pt would continue to benefit from skilled PT services to improve overall functional mobility, strength and endurance.  .    Rehab identified problem list/impairments: impaired endurance, impaired self care skills, impaired functional mobilty, gait instability, impaired balance, decreased upper extremity function    Rehab potential is good.    Activity tolerance: Fair    Discharge recommendations: home health PT     Barriers to discharge: Inaccessible home environment, Decreased caregiver support    Equipment recommendations: wheelchair     GOALS:   Multidisciplinary Problems     Physical Therapy Goals        Problem: Physical Therapy " Goal    Goal Priority Disciplines Outcome Goal Variances Interventions   Physical Therapy Goal     PT, PT/OT Ongoing, Progressing     Description:  Goals to be met by: 2020     Patient will increase functional independence with mobility by performin. Supine to sit with Modified [place]  2. Sit to supine with Modified [place]  3. Rolling to Left and Right with Modified [place].  4. Sit to stand transfer with Supervision  5. Bed to chair transfer with Supervision using Rolling Walker  6. Gait  x 150 feet with Supervision using Rolling Walker.   7. Wheelchair propulsion x150 feet with Supervision using bilateral uppper extremities  8. Ascend/descend 4 stair with bilateral Handrails Supervision  9. Ascend/Descend 4 inch curb step with Stand-by Assistance using Rolling Walker.  10. Stand for 3 minutes with Stand-by Assistance using Rolling Walker while performing a dynamic standing activity  11. Pt will perform a car transfer w/ RW and SBA  12. Pt will walk over uneven surface (outside ramp) w/ RW and CGA  13. Pt will  object from floor in a standing position w/ RW, use of a reacher, and SPV                       PLAN:    Patient to be seen 5 x/week  to address the above listed problems via therapeutic activities, gait training, therapeutic exercises  Plan of Care expires: 20  Plan of Care reviewed with: patient    Kathy Chen, PTA  2020

## 2020-04-06 NOTE — PLAN OF CARE
POC reviewed with patient; understanding verbalized. Free from falls and injuries this shift. Reg/1200 mL fluid restriction diet. Good appetite. Pt instructed to call when getting OOB. Pt with nonskid footwear on, bed in lowest position, and locked with bed rails up x 2. Pt has call light and personal items within reach. VSS and afebrile this shift. All questions and concerns addressed at this time. Will continue to monitor.

## 2020-04-06 NOTE — PLAN OF CARE
Repositions independently, no new skin breakdowns noted. Afebrile. Monitored for pain and safety. Safety maintained. Pain meds effective

## 2020-04-06 NOTE — PT/OT/SLP PROGRESS
"Speech Language Pathology  Treatment    Selma Lux   MRN: [id number]   Admitting Diagnosis: <principal problem not specified>    Diet recommendations: Solid Diet Level: Regular  Liquid Diet Level: Thin Standard aspiration precautions    SLP Treatment Date: 04/06/20  Speech Start Time: 0927     Speech Stop Time: 1000     Speech Total (min): 33 min       TREATMENT BILLABLE MINUTES:  Speech Therapy Individual (Cognitive skills intervention, 2 units) 33    Has the patient been evaluated by SLP for swallowing? : No  Keep patient NPO?: No   General Precautions: Standard, fall  Current Respiratory Status: room air       Subjective:  Patient awake and alert during session  "I want to stay for one more week."   "My goal is to be able to walk with a cane by the time I leave."    Objective:   Patient seen for ongoing cognitive therapy. She was sitting up in wheelchair during session. Patient reported chronic mild memory loss, but stated that she felt like was near baseline from a ST perspective. She independently recalled each type of therapy and gave an appropriate description of each. She demonstrated good knowledge of current medical condition as well as the broad POC for her SNF stay and post-discharge. She independently recalled personal information and answered complex questions involving personal info. She was WFL for 4-part mental manipulation tasks and 4-step sequencing tasks. She spoke at length about recent health concerns and participated appropriately with conversation. SLP educated patient regarding POC for ST and she verbalized understanding. Patient left in wheelchair with call light within reach.     Assessment:  Selma Lux is a 84 y.o. female with a medical diagnosis of respiratory failure and presents with improving cognitive status. Patient likely at or near baseline for ST at this time.     Discharge recommendations: Discharge Facility/Level of Care Needs: home health speech therapy(at " patient's discretion)     Goals:   Multidisciplinary Problems     SLP Goals        Problem: SLP Goal    Goal Priority Disciplines Outcome   SLP Goal     SLP Ongoing, Progressing   Description:  Speech Language Pathology Goals  Goals expected to be met by 4/6:  1. Pt will recall 3/3 words or facts after a 2 minute delay given min cues.   2. Pt will follow 3-step commands with 70% accuracy given mod cues.   3. Pt will complete moderate level problem solving tasks with 70% accuracy given min-mod cues.   4. Pt will complete mental manipulation tasks with 70% accuracy given mod cues.   5. Pt will complete sustained attention tasks with 70% accuracy given mod cues.   6. Pt will participate in further assessment of reading, writing, and visual spatial abilities.                              Plan:   Patient to be seen Therapy Frequency: 3 x/week  Planned Interventions: Cognitive-Linguistic Therapy  Plan of Care expires: 04/30/20  Plan of Care reviewed with: patient  SLP Follow-up?: Yes  SLP - Next Visit Date: 04/08/20      Porfirio Khoury CCC-SLP  04/06/2020

## 2020-04-07 PROCEDURE — 97535 SELF CARE MNGMENT TRAINING: CPT | Mod: CO

## 2020-04-07 PROCEDURE — 97803 MED NUTRITION INDIV SUBSEQ: CPT

## 2020-04-07 PROCEDURE — 97116 GAIT TRAINING THERAPY: CPT | Mod: CQ

## 2020-04-07 PROCEDURE — 97110 THERAPEUTIC EXERCISES: CPT | Mod: CO

## 2020-04-07 PROCEDURE — 63600175 PHARM REV CODE 636 W HCPCS: Performed by: INTERNAL MEDICINE

## 2020-04-07 PROCEDURE — 97110 THERAPEUTIC EXERCISES: CPT | Mod: CQ

## 2020-04-07 PROCEDURE — 11000004 HC SNF PRIVATE

## 2020-04-07 PROCEDURE — U0002 COVID-19 LAB TEST NON-CDC: HCPCS

## 2020-04-07 PROCEDURE — 97130 THER IVNTJ EA ADDL 15 MIN: CPT

## 2020-04-07 PROCEDURE — 25000003 PHARM REV CODE 250: Performed by: INTERNAL MEDICINE

## 2020-04-07 PROCEDURE — 97129 THER IVNTJ 1ST 15 MIN: CPT

## 2020-04-07 RX ADMIN — ATORVASTATIN CALCIUM 40 MG: 20 TABLET, FILM COATED ORAL at 09:04

## 2020-04-07 RX ADMIN — AMLODIPINE BESYLATE 10 MG: 10 TABLET ORAL at 10:04

## 2020-04-07 RX ADMIN — HYDROCODONE BITARTRATE AND ACETAMINOPHEN 1 TABLET: 5; 325 TABLET ORAL at 09:04

## 2020-04-07 RX ADMIN — MYCOPHENOLATE MOFETIL 500 MG: 250 CAPSULE ORAL at 10:04

## 2020-04-07 RX ADMIN — PREDNISONE 20 MG: 20 TABLET ORAL at 12:04

## 2020-04-07 RX ADMIN — Medication 9 MG: at 09:04

## 2020-04-07 RX ADMIN — PANTOPRAZOLE SODIUM 20 MG: 20 TABLET, DELAYED RELEASE ORAL at 05:04

## 2020-04-07 RX ADMIN — FUROSEMIDE 40 MG: 40 TABLET ORAL at 10:04

## 2020-04-07 RX ADMIN — Medication 100 MG: at 12:04

## 2020-04-07 RX ADMIN — BACLOFEN 10 MG: 10 TABLET ORAL at 10:04

## 2020-04-07 RX ADMIN — ASPIRIN 81 MG: 81 TABLET, COATED ORAL at 12:04

## 2020-04-07 RX ADMIN — MONTELUKAST 10 MG: 10 TABLET, FILM COATED ORAL at 09:04

## 2020-04-07 RX ADMIN — HYDROCODONE BITARTRATE AND ACETAMINOPHEN 1 TABLET: 5; 325 TABLET ORAL at 11:04

## 2020-04-07 RX ADMIN — Medication 400 MG: at 12:04

## 2020-04-07 RX ADMIN — STANDARDIZED SENNA CONCENTRATE AND DOCUSATE SODIUM 1 TABLET: 8.6; 5 TABLET ORAL at 12:04

## 2020-04-07 RX ADMIN — HYDROXYCHLOROQUINE SULFATE 200 MG: 200 TABLET, FILM COATED ORAL at 10:04

## 2020-04-07 RX ADMIN — ENOXAPARIN SODIUM 40 MG: 100 INJECTION SUBCUTANEOUS at 04:04

## 2020-04-07 RX ADMIN — BACLOFEN 10 MG: 10 TABLET ORAL at 09:04

## 2020-04-07 RX ADMIN — MYCOPHENOLATE MOFETIL 500 MG: 250 CAPSULE ORAL at 09:04

## 2020-04-07 RX ADMIN — HYDROXYCHLOROQUINE SULFATE 200 MG: 200 TABLET, FILM COATED ORAL at 09:04

## 2020-04-07 NOTE — PT/OT/SLP PROGRESS
"Physical Therapy  Treatment    Selma Lux   MRN: [id number]   Admitting Diagnosis: Respiratory Failure  PT Received On: 04/07/20          Billable Minutes:  Gait Training 15 and Therapeutic Exercise 27    Treatment Type: Treatment  PT/PTA: PTA     PTA Visit Number: 2       General Precautions: Standard, fall  Orthopedic Precautions: N/A   Braces: N/A    Spiritual, Cultural Beliefs, Yazidism Practices, Values that Affect Care: no    Subjective:  "let me go in there first" bathroom      Pain/Comfort  Pain Rating 1: 0/10  Pain Rating Post-Intervention 1: 0/10    Objective:   Patient found with: (in wc)     AM-PAC 6 CLICK MOBILITY  Total Score:18    Transfers:  Sit<>Stand: with RW SBA/S  Stand Pivot Transfer: CG/SBA with RW , with grab bar SBA WC<>toilet    Gait:  Amb with RW CGA ~ 135 ft no LOB     Therex:  2x10 reps seated AP,GS,LAQ,hip flex,abd/add  Standing with RW CGA heel raises, MIP, mini squats 2x10 reps  toileting SBA/S with all aspects     Patient left up in chair with call button in reach and belongings in reach.    Assessment:  Selma Lux is a 84 y.o. female with a medical diagnosis of Respiratory Failure Pt tolerated well, pt would continue to benefit from skilled PT services to improve overall functional mobility, strength and endurance.  .    Rehab identified problem list/impairments: impaired endurance, impaired self care skills, impaired functional mobilty, gait instability, impaired balance, decreased upper extremity function    Rehab potential is good.    Activity tolerance: Fair    Discharge recommendations: home health PT     Barriers to discharge: Inaccessible home environment, Decreased caregiver support    Equipment recommendations: wheelchair     GOALS:   Multidisciplinary Problems     Physical Therapy Goals        Problem: Physical Therapy Goal    Goal Priority Disciplines Outcome Goal Variances Interventions   Physical Therapy Goal     PT, PT/OT Ongoing, Progressing   "   Description:  Goals to be met by: 2020     Patient will increase functional independence with mobility by performin. Supine to sit with Modified [place]  2. Sit to supine with Modified [place]  3. Rolling to Left and Right with Modified [place].  4. Sit to stand transfer with Supervision  5. Bed to chair transfer with Supervision using Rolling Walker  6. Gait  x 150 feet with Supervision using Rolling Walker.   7. Wheelchair propulsion x150 feet with Supervision using bilateral uppper extremities  8. Ascend/descend 4 stair with bilateral Handrails Supervision  9. Ascend/Descend 4 inch curb step with Stand-by Assistance using Rolling Walker.  10. Stand for 3 minutes with Stand-by Assistance using Rolling Walker while performing a dynamic standing activity  11. Pt will perform a car transfer w/ RW and SBA  12. Pt will walk over uneven surface (outside ramp) w/ RW and CGA  13. Pt will  object from floor in a standing position w/ RW, use of a reacher, and SPV                       PLAN:    Patient to be seen 5 x/week  to address the above listed problems via therapeutic activities, gait training, therapeutic exercises  Plan of Care expires: 20  Plan of Care reviewed with: patient    Kathy Chen, PTA  2020

## 2020-04-07 NOTE — PLAN OF CARE
Problem: SLP Goal  Goal: SLP Goal  Description  Updated Speech Therapy Short Term Goals  Goal expected to be met by 4/21  1. Pt will recall functional information after a 5+ minute delay with 80% acc min cues.   2. Pt will follow 3-step commands with 70% accuracy given mod cues.  3. Pt will complete moderate level problem solving tasks with 70% accuracy given min-mod cues.   4. Pt will complete mental manipulation tasks with 75% accuracy given mod cues.  5. Pt will complete sustained attention tasks with 80% accuracy given mod cues.   6. Pt will participate in further assessment of reading, writing, and visual spatial abilities.     Speech Language Pathology Goals  Goals expected to be met by 4/6:  1. Pt will recall 3/3 words or facts after a 2 minute delay given min cues. -met  2. Pt will follow 3-step commands with 70% accuracy given mod cues. -ongoing  3. Pt will complete moderate level problem solving tasks with 70% accuracy given min-mod cues. -ongoing  4. Pt will complete mental manipulation tasks with 70% accuracy given mod cues. -met  5. Pt will complete sustained attention tasks with 70% accuracy given mod cues. -met  6. Pt will participate in further assessment of reading, writing, and visual spatial abilities. -ongoing            Outcome: Ongoing, Progressing   Goals updated. ST will continue to follow.   LENORA Thomason., CCC-SLP  04/07/2020

## 2020-04-07 NOTE — PT/OT/SLP PROGRESS
"Speech Language Pathology  Treatment    Selma Lux   MRN: [id number]   Admitting Diagnosis: <principal problem not specified>    Diet recommendations: Solid Diet Level: Regular  Liquid Diet Level: Thin Standard aspiration precautions    SLP Treatment Date: 04/07/20  Speech Start Time: 0825     Speech Stop Time: 0858     Speech Total (min): 33 min       TREATMENT BILLABLE MINUTES:  Speech Therapy Individual (Cognitive skills intervention, 2 units) 33    Has the patient been evaluated by SLP for swallowing? : No  Keep patient NPO?: No   General Precautions: Standard, fall  Current Respiratory Status: room air       Subjective:  Patient awake and alert during session  "I was leaving this week, but I asked for one more."    Objective:   Patient seen for ongoing cognitive therapy at the bedside. Patient states, "I feel like having a lazy morning until OT comes." Patient recalled functional facts after a 2 minute delay with 90% acc no cues. She recalled information about a picture scene after a 5+ minute delay with 70% acc min cues. She completed a 3 word mental manipulation task with 90% acc and 4 word mental manipulation task with 70% acc. Improved sustained attention noted throughout session. Patient with significant difficulty recalling medical history despite cues. She answered functional reasoning questions with 85% acc. Patient reports she is not at baseline, but 'is happy with where she is at.' She reports she wishes to continue Speech Therapy. SLP provided education on SLP POC and ST goals. Patient verbalized understanding and is in agreement with POC.     Assessment:  Selma Lux is a 84 y.o. female with a medical diagnosis of respiratory failure and presents with improving cognitive status. Patient likely at or near baseline for ST at this time.     Discharge recommendations: Discharge Facility/Level of Care Needs: home health speech therapy     Goals:   Multidisciplinary Problems     SLP Goals  "       Problem: SLP Goal    Goal Priority Disciplines Outcome   SLP Goal     SLP Ongoing, Progressing   Description:  Updated Speech Therapy Short Term Goals  Goal expected to be met by 4/21  1. Pt will recall functional information after a 5+ minute delay with 80% acc min cues.   2. Pt will follow 3-step commands with 70% accuracy given mod cues.  3. Pt will complete moderate level problem solving tasks with 70% accuracy given min-mod cues.   4. Pt will complete mental manipulation tasks with 75% accuracy given mod cues.  5. Pt will complete sustained attention tasks with 80% accuracy given mod cues.   6. Pt will participate in further assessment of reading, writing, and visual spatial abilities.     Speech Language Pathology Goals  Goals expected to be met by 4/6:  1. Pt will recall 3/3 words or facts after a 2 minute delay given min cues. -met  2. Pt will follow 3-step commands with 70% accuracy given mod cues. -ongoing  3. Pt will complete moderate level problem solving tasks with 70% accuracy given min-mod cues. -ongoing  4. Pt will complete mental manipulation tasks with 70% accuracy given mod cues. -met  5. Pt will complete sustained attention tasks with 70% accuracy given mod cues. -met  6. Pt will participate in further assessment of reading, writing, and visual spatial abilities. -ongoing                              Plan:   Patient to be seen Therapy Frequency: 3 x/week  Planned Interventions: Cognitive-Linguistic Therapy  Plan of Care expires: 04/30/20  Plan of Care reviewed with: patient  SLP Follow-up?: Yes  SLP - Next Visit Date: 04/09/20      XUAN Platt, CCC-SLP  04/07/2020

## 2020-04-07 NOTE — PT/OT/SLP PROGRESS
"Occupational Therapy  Treatment    Selma Lux   MRN: [id number]   Admitting Diagnosis: <principal problem not specified>    OT Date of Treatment: 04/07/20       Billable Minutes:  Self Care/Home Management 25 and Therapeutic Exercise 9    General Precautions: Standard, fall  Orthopedic Precautions: N/A  Braces: N/A    Spiritual, Cultural Beliefs, Restorationist Practices, Values that Affect Care: no    Subjective:  Communicated with Pt prior to session.  "I am feeling well, sorry I am running a little behind, I was on the phone with my daughter and a girlfriend of mine earlier."P    Pain/Comfort  Pain Rating 1: 5/10  Location - Side 1: Left  Location - Orientation 1: generalized  Location 1: shoulder  Pain Addressed 1: Cessation of Activity  Pain Rating Post-Intervention 1: 0/10    Objective:   Pt found in bathroom, sitting in w/c working on her g/h tasks.     Occupational Performance:    Bed Mobility:    -N/A- sitting in w/c in bathroom upon OT arrival     Functional Mobility/Transfers:  * N/A pt remained in w/c for therapy session    · Functional Mobility: Pt able to self propel and operate w/c within room without assistance     Activities of Daily Living:  · Grooming: modified independence brushed hair, washed face and applied lotion to face while in w/c at sink   · Lower Body Dressing: minimum assistance with help putting on socks & shoes, able to lace shoes by herself    [facility] 6 Click:  [facility] Total Score:      OT Exercises: AROM 2x10 BUE bicep curls, shoulder flexion, shoulder abd & add.    Additional Treatment:  * Pt agreeable to tx session and with pleasant demeanor. Pt w./ good understanding on exercises and POC.     Patient left sitting in w/c with call button in reach    ASSESSMENT:  Selma Lux is a 84 y.o. female with a medical diagnosis of <principal problem not specified> Pt tolerated session well on this day. Pt required min verbal cues to perform therapeutic exercises. PT doing well w/ " self ADL task. Pt will benefit from continued OT POC.     Rehab identified problem list/impairments: weakness, impaired endurance, impaired self care skills, impaired functional mobilty, gait instability, impaired balance, decreased upper extremity function, decreased ROM    Rehab potential is fair    Activity tolerance: Fair    Discharge recommendations: home with home health     Barriers to discharge: Decreased caregiver support, Inaccessible home environment     Equipment recommendations: bath bench     GOALS:   Multidisciplinary Problems     Occupational Therapy Goals        Problem: Occupational Therapy Goal    Goal Priority Disciplines Outcome Interventions   Occupational Therapy Goal     OT, PT/OT Ongoing, Progressing    Description:  Goals to be met by:  4/8/2020    Patient will increase functional independence with ADLs by performing:    UE Dressing with Modified [place].=MET 4/3/2020  LE Dressing with Modified [place].=MET 4/3/2020  Grooming while standing at sink with Modified [place].=MET 4/3/2020  Toileting from toilet with Modified [place] for hygiene and clothing management.   Bathing from shower chair/bench with Modified [place].  Supine to sit with Modified [place] with HOB flat and no handrails.  Stand pivot transfers with Modified [place].  Toilet transfer to toilet with Modified [place].=MET 4/3/2020  Upper extremity exercise program 3 x 15 reps, with independence.  Patient will complete a dynamic standing activity for 8 min in order to perform household tasks.                       Plan:  Patient to be seen 3 x/week to address the above listed problems via self-care/home management, therapeutic activities, therapeutic exercises  Plan of Care expires: 04/28/20  Plan of Care reviewed with: patient    GENE Schneider  04/07/2020

## 2020-04-08 LAB
ALBUMIN SERPL BCP-MCNC: 3.3 G/DL (ref 3.5–5.2)
ANION GAP SERPL CALC-SCNC: 9 MMOL/L (ref 8–16)
BASOPHILS # BLD AUTO: 0.01 K/UL (ref 0–0.2)
BASOPHILS NFR BLD: 0.1 % (ref 0–1.9)
BUN SERPL-MCNC: 21 MG/DL (ref 8–23)
CALCIUM SERPL-MCNC: 8.9 MG/DL (ref 8.7–10.5)
CHLORIDE SERPL-SCNC: 103 MMOL/L (ref 95–110)
CO2 SERPL-SCNC: 26 MMOL/L (ref 23–29)
CREAT SERPL-MCNC: 1.2 MG/DL (ref 0.5–1.4)
DIFFERENTIAL METHOD: ABNORMAL
EOSINOPHIL # BLD AUTO: 0 K/UL (ref 0–0.5)
EOSINOPHIL NFR BLD: 0.1 % (ref 0–8)
ERYTHROCYTE [DISTWIDTH] IN BLOOD BY AUTOMATED COUNT: 19.8 % (ref 11.5–14.5)
EST. GFR  (AFRICAN AMERICAN): 48.6 ML/MIN/1.73 M^2
EST. GFR  (NON AFRICAN AMERICAN): 42.2 ML/MIN/1.73 M^2
GLUCOSE SERPL-MCNC: 100 MG/DL (ref 70–110)
HCT VFR BLD AUTO: 37 % (ref 37–48.5)
HGB BLD-MCNC: 11 G/DL (ref 12–16)
IMM GRANULOCYTES # BLD AUTO: 0.05 K/UL (ref 0–0.04)
IMM GRANULOCYTES NFR BLD AUTO: 0.6 % (ref 0–0.5)
LYMPHOCYTES # BLD AUTO: 1.1 K/UL (ref 1–4.8)
LYMPHOCYTES NFR BLD: 14.2 % (ref 18–48)
MAGNESIUM SERPL-MCNC: 1.9 MG/DL (ref 1.6–2.6)
MCH RBC QN AUTO: 26.6 PG (ref 27–31)
MCHC RBC AUTO-ENTMCNC: 29.7 G/DL (ref 32–36)
MCV RBC AUTO: 90 FL (ref 82–98)
MONOCYTES # BLD AUTO: 1.1 K/UL (ref 0.3–1)
MONOCYTES NFR BLD: 13.9 % (ref 4–15)
NEUTROPHILS # BLD AUTO: 5.6 K/UL (ref 1.8–7.7)
NEUTROPHILS NFR BLD: 71.1 % (ref 38–73)
NRBC BLD-RTO: 0 /100 WBC
PHOSPHATE SERPL-MCNC: 3.7 MG/DL (ref 2.7–4.5)
PLATELET # BLD AUTO: 187 K/UL (ref 150–350)
PMV BLD AUTO: 11 FL (ref 9.2–12.9)
POTASSIUM SERPL-SCNC: 4.2 MMOL/L (ref 3.5–5.1)
RBC # BLD AUTO: 4.13 M/UL (ref 4–5.4)
SARS-COV-2 RNA RESP QL NAA+PROBE: NOT DETECTED
SODIUM SERPL-SCNC: 138 MMOL/L (ref 136–145)
WBC # BLD AUTO: 7.9 K/UL (ref 3.9–12.7)

## 2020-04-08 PROCEDURE — 85025 COMPLETE CBC W/AUTO DIFF WBC: CPT

## 2020-04-08 PROCEDURE — 25000003 PHARM REV CODE 250: Performed by: INTERNAL MEDICINE

## 2020-04-08 PROCEDURE — 97535 SELF CARE MNGMENT TRAINING: CPT | Mod: CO

## 2020-04-08 PROCEDURE — 97110 THERAPEUTIC EXERCISES: CPT | Mod: CO

## 2020-04-08 PROCEDURE — 97110 THERAPEUTIC EXERCISES: CPT | Mod: CQ

## 2020-04-08 PROCEDURE — 36415 COLL VENOUS BLD VENIPUNCTURE: CPT

## 2020-04-08 PROCEDURE — 97530 THERAPEUTIC ACTIVITIES: CPT | Mod: CQ

## 2020-04-08 PROCEDURE — 63600175 PHARM REV CODE 636 W HCPCS: Performed by: INTERNAL MEDICINE

## 2020-04-08 PROCEDURE — 11000004 HC SNF PRIVATE

## 2020-04-08 PROCEDURE — 80069 RENAL FUNCTION PANEL: CPT

## 2020-04-08 PROCEDURE — 83735 ASSAY OF MAGNESIUM: CPT

## 2020-04-08 PROCEDURE — 97116 GAIT TRAINING THERAPY: CPT | Mod: CQ

## 2020-04-08 RX ADMIN — PREDNISONE 20 MG: 20 TABLET ORAL at 11:04

## 2020-04-08 RX ADMIN — ATORVASTATIN CALCIUM 40 MG: 20 TABLET, FILM COATED ORAL at 09:04

## 2020-04-08 RX ADMIN — HYDROXYCHLOROQUINE SULFATE 200 MG: 200 TABLET, FILM COATED ORAL at 08:04

## 2020-04-08 RX ADMIN — MYCOPHENOLATE MOFETIL 500 MG: 250 CAPSULE ORAL at 09:04

## 2020-04-08 RX ADMIN — HYDROXYCHLOROQUINE SULFATE 200 MG: 200 TABLET, FILM COATED ORAL at 09:04

## 2020-04-08 RX ADMIN — ACETAMINOPHEN 650 MG: 325 TABLET ORAL at 03:04

## 2020-04-08 RX ADMIN — MONTELUKAST 10 MG: 10 TABLET, FILM COATED ORAL at 09:04

## 2020-04-08 RX ADMIN — AMLODIPINE BESYLATE 10 MG: 10 TABLET ORAL at 08:04

## 2020-04-08 RX ADMIN — BACLOFEN 10 MG: 10 TABLET ORAL at 09:04

## 2020-04-08 RX ADMIN — PANTOPRAZOLE SODIUM 20 MG: 20 TABLET, DELAYED RELEASE ORAL at 05:04

## 2020-04-08 RX ADMIN — ENOXAPARIN SODIUM 40 MG: 100 INJECTION SUBCUTANEOUS at 05:04

## 2020-04-08 RX ADMIN — BACLOFEN 10 MG: 10 TABLET ORAL at 08:04

## 2020-04-08 RX ADMIN — FUROSEMIDE 40 MG: 40 TABLET ORAL at 08:04

## 2020-04-08 RX ADMIN — Medication 400 MG: at 11:04

## 2020-04-08 RX ADMIN — MYCOPHENOLATE MOFETIL 500 MG: 250 CAPSULE ORAL at 08:04

## 2020-04-08 RX ADMIN — ASPIRIN 81 MG: 81 TABLET, COATED ORAL at 11:04

## 2020-04-08 RX ADMIN — SULFAMETHOXAZOLE AND TRIMETHOPRIM 1 TABLET: 800; 160 TABLET ORAL at 08:04

## 2020-04-08 RX ADMIN — Medication 100 MG: at 11:04

## 2020-04-08 RX ADMIN — STANDARDIZED SENNA CONCENTRATE AND DOCUSATE SODIUM 1 TABLET: 8.6; 5 TABLET ORAL at 11:04

## 2020-04-08 RX ADMIN — Medication 9 MG: at 09:04

## 2020-04-08 NOTE — PROGRESS NOTES
[facility]                                  Skilled Nursing Facility                   Progress Note     Admit Date: 3/27/2020  LETICIA 4/14/2020  Principal Problem:  Respiratory failure   HPI obtained from patient interview and chart review     VIRTUAL TELENOTE     Patient Location: [facility]- 3rd floor SNF   Provider Location: Providers Home  Start time: 1000  End time:  1040  Total time spent with patient:   40 Minutes      Evaluation by video and audio      Chief Complaint: Establish Care/ Initial Visit     HPI:    Lux is a 84 year old female with PMHx of HFrEF, COPD, Cryptogenic organizing pneumonia on chronic prednisone, RA on plaquenil and cellcept, HTN, HPLD, TIA, vascular dementia, pulmonary HTN secondary to ILD, Long QT interval who presents to SNF following hospitalization for acute metabolic encephalopathy suspected secondary to UTI and CAP.  Admission to SNF for secondary weakness and debility.    Patient originally presented to [facility] ED on 02/27 with generalized weakness and intermittent mechanical falls for the past 2 weeks piror to admisison. 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     Obtained urine and started on BSabx. Unfortunately for blood cultures not obtained, ordered, NGTD. Urine with E Coli. Hypoglycemia noted, suspecting from infection and poor po intake. Since admission patient has gradually improved with supportive care. Changed IV abx to PO. On 3/2 morning patient was found to be hypoxic Sp02 88% and lethargic.  No piror hx of FREEDOM. Respiratory panel negative. ABG with respiratory alkalosis. CTA with no PE but noting worsening consolidations, will consult pulmonary. Restarted BSabx. Pulmonary offered bronchoscopy due to hx of , however patient/daughter  declined at this time. Plan to continue BSabx for now, and now increased home steroids as per pulmonary. De-escalated abx to cefepime for total 7 days as per pulmonary. Plan to continue high dose steroids for a few weeks before beginning to taper off. Started on bactrim MWF while she remains of >20mg prednisone per day. PT OT continue to work with patient. Patient finished her 7 day course of cefepime. Resumed her cellcept.  She will need to fu with PCP and pulmonary on discharge. Consider OP sleep study, will defer to pulmonary.      Patient will be treated at [facility] with PT and OT to improve functional status and ability to perform ADLs.     Past Medical History: Patient has a past medical history of Acute hypoxemic respiratory failure (4/11/2018), Altered mental status, Anemia, Arthritis, Bilateral hand pain (3/14/2018), Branch retinal vein occlusion, left eye (2/20/2015), Chronic bilateral low back pain without sciatica (3/23/2017), Chronic renal failure in pediatric patient, stage 3 (moderate) (4/15/2018), Cognitive communication deficit (12/19/2017), Cystoid macular edema, left eye (2/20/2015), Cystoid macular edema, left eye (2/20/2015), DJD (degenerative joint disease) of cervical spine (5/15/2013), Fatty liver (8/26/2014), Goiter (4/9/2018), Hashimoto's disease, Hemichorea (8/23/2017), Hypertension, Hypertensive encephalopathy, IBS (irritable bowel syndrome) (6/21/2017), IGT (impaired glucose tolerance) (1/12/2016), Iron deficiency anemia secondary to inadequate dietary iron intake (6/24/2013), Joint pain, Keratoconjunctivitis sicca of both eyes not specified as Sjogren's (7/29/2016), Leiomyoma of uterus, unspecified (9/16/2014), Long QT interval (6/29/2016), Macular edema (1/12/2015), Multinodular goiter (1/12/2016), Neuropathy (8/23/2017), Plaquenil causing adverse effect in therapeutic use (10/7/2016), Pneumococcal vaccine refused (8/17/2016), Pneumonia due to Streptococcus pneumoniae (4/5/2018),  Primary osteoarthritis involving multiple joints (10/21/2015), 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    Past Surgical History: Patient has a past surgical history that includes Salivary gland surgery; Tonsillectomy; left parotidectomy; Cataract extraction; Colonoscopy (N/A, 9/29/2015); Joint replacement; Knee surgery (Left, 12/31/2013); Eye surgery; and Breast cyst excision.    Social History: Patient reports that she has never smoked. She has never used smokeless tobacco. She reports that she drinks alcohol. She reports that she does not use drugs.    Family History: family history includes Breast cancer in her maternal grandmother; Cancer in her father; Heart disease in her mother; Hypertension in her mother; Hyperthyroidism in her mother and other; Prostate cancer in her father.    Allergies: Patient has No Known Allergies.    ROS  Constitutional: Negative for fever   Eyes: Negative for blurred vision, double vision   Respiratory: Negative for cough, shortness of breath. + BEJARANO   Cardiovascular: Negative for chest pain, palpitations, and leg swelling.   Gastrointestinal: Negative for abdominal pain, constipation, diarrhea, nausea and vomiting.   Genitourinary: Negative for dysuria, frequency and urgency.   Musculoskeletal:  + generalized weakness. Negative for back pain and myalgias.   Skin: Negative for itching and rash.   Neurological: Negative for dizziness, speech change, and headaches.   Psychiatric/Behavioral: Negative for depression. The patient is not nervous/anxious.      PEx  Temp:  [97.7 °F (36.5 °C)-98 °F (36.7 °C)]   Pulse:  []   Resp:  [18-20]   BP: (118-126)/(62-65)   SpO2:  [97 %]      PEx completed with help of bedside nurse     Constitutional: Patient appears  well-developed.  No distress noted  HENT:   Head: Normocephalic and atraumatic.   Eyes: Pupils are equal, round  Neck: Normal range of motion. Neck supple.   Cardiovascular: Normal rate, regular rhythm and normal heart sounds.    Pulmonary/Chest: Effort normal and breath sounds are clear  Abdominal: Soft. Bowel sounds are normal.   Musculoskeletal: Normal range of motion.   Neurological: Alert and oriented to person, place, and time.   Psychiatric: Normal mood and affect. Behavior is normal.   Skin: Skin is warm and dry.     Recent Labs   Lab 04/08/20  0413      K 4.2      CO2 26   BUN 21   CREATININE 1.2   MG 1.9       Recent Labs   Lab 04/08/20  0413   WBC 7.90   RBC 4.13   HGB 11.0*   HCT 37.0      MCV 90   MCH 26.6*   MCHC 29.7*         Assessment and Plan:    Acute hypoxic respiratory failure  Community acquired pneumonia  Cryptogenic organizing pneumonia on chronic prednisone  Pulmonary HTN secondary to ILD  - On admit - CXR with bibasilar infiltrates however no fever or cough  - started on azithro and rocephin which was transitioned to keflex for UTI as patient didn't have any fever, dyspnea or cough  - 3/2 morning patient was found to be hypoxic Sp02 88% and lethargic.   - Stat ABG and CTA ordered. ABG with respiratory alkalosis  - CTA with worsening consolidations, restarted BSabx for now, and consulted pulmonary for therapy guidance considering her ILD and   - Bronchoscopy by pulm due to hx of , however patient/daughter declined  - Patient was attempted on CPAP overnight for concerns of underlying FREEDOM, and patient tolerated well with resultant good sleep as per daughter/patient. However she refused this overnight  - Bactrium as per pulmonary for PCP ppx (>20mg steroids daily)  - De-escalate abx to total 7 days of cefepime (last dose 3/9 - today)  - Restart MMF once off antibiotics   - When on MMF, will decrease to prednisone dose as per pulmonary   - Radha scheduled and  supplemental oxygen PRN  - Will need pulmonary fu on discharge      Acute cystitis - resolved  - symptoms concerning for UTI with altered mentation  - UA positive for bacteria and WBC, leuk and nitrates  - given fluids and rocephin  - urine cx with e coli, changed ceftriaxone to keflex - finished tx  - bcx not taken in ED, ordered, NGTD      Generalized weakness  Multiple falls at home  - secondary to above issues  - PT OT rec SNF  - [facility] updated, they have sent referrals     HFrEF  - last TTE in 8/2019 with 40% EF  - appears compensated at this time  - repeat TTE with EF 55% and grade 1 DD, normal CVP     HTN  - hold home amlodipine for now     RA  - continue home plaquenil and steroids  - resume cellcept once off abx     HLD  - continue statin    Long QT interval   - last qtC 311 on 2/27/2020         Future Appointments   Date Time Provider Department Center   4/20/2020  1:00 PM Bhargav Lee MD [facility] IM [address] PCW   4/22/2020  8:00 AM Demond Wolf MD [facility] CARDIO [address]   5/1/2020  1:30 PM Dee Thomson MD [facility]   10/28/2020  9:00 AM [facility] US[facility]O [facility]   11/9/2020  1:30 PM Rashad Monroy MD [facility] ENDO8 [facility]     I certify that SNF services are required to be given on an inpatient basis because Selma Lux needs for skilled nursing care and/or skilled rehabilitation are required on a daily basis and such services can only practically be provided in a skilled nursing facility setting and are for an ongoing condition for which she received inpatient care in the hospital.     Total time of the visit 65 minutes [id number]  Non physical exam/ non charting time: 45 minutes   Description of non physical exam/non charting time: counseling patient on clinical conditions and therapies provided regarding pneumonia and supportive treatments, RA treatment, and discharge planning .  Extensive phone conversation held patient's daughter to provide  comprehensive care update per her request.  Extensive chart review completed including all consultation notes.  All pertinent laboratory and radiographical images reviewed.        Amber Bay NP  Department of Hospital Medicine   [facility]     DOS: 4/9/2020       Patient note was created using MModal Dictation.  Any errors in syntax or even information may not have been identified and edited on initial review prior to signing this note.

## 2020-04-08 NOTE — PT/OT/SLP PROGRESS
"Physical Therapy  Treatment    Selma Lux   MRN: [id number]   Admitting Diagnosis: <principal problem not specified>    PT Received On: 04/08/20          Billable Minutes:  Gait Training 10, Therapeutic Activity 15 and Therapeutic Exercise 13    Treatment Type: Treatment  PT/PTA: PTA     PTA Visit Number: 3       General Precautions: Standard, fall  Orthopedic Precautions: N/A   Braces: N/A    Spiritual, Cultural Beliefs, Zoroastrian Practices, Values that Affect Care: no    Subjective:  "I'm good" Pt agreebale to therapy    Pain/Comfort  Pain Rating 1: 4/10  Location - Side 1: Left  Location - Orientation 1: generalized  Location 1: shoulder  Pain Addressed 1: Reposition, Distraction  Pain Rating Post-Intervention 1: 4/10    Objective:  Patient found in in wc in room        AM-PAC 6 CLICK MOBILITY  Total Score:18    Transfers:  Sit<>Stand: t/f wc S RW    Gait:  Amb 150' SBA RW slow dora decreased step width alost scissoring gait    Advanced Gait:  Stairs: 4 BHR SBA    Wheelchair Mobility:  Patient propels w/c 50' BUE S     Therex:  2 x 10 AP, LAQ, HF, abd/add, GS    Balance:  Piecked up object x 4 with reacher unilateral UE support CGA  Dynamic standing at table with unilateral UE support while performing card matching activity SBA 3 minutes  No LOB noted throughout session    Additional Treatment:  toileting all aspects S  Patient educated on importance of increased time out of bed and SANAZ throughout the day    Patient left up in chair with call button in reach.    Assessment:  Selma Lux is a 84 y.o. female with a medical diagnosis of <principal problem not specified>.  Patient tolerated treatment well focusing on transfers, gait and therex. Patient will continue to improve with skilled physical therapy services in order to return to functional baseline.    Rehab identified problem list/impairments: impaired endurance, impaired self care skills, impaired functional mobilty, gait instability, " impaired balance, decreased upper extremity function    Rehab potential is good.    Activity tolerance: Good    Discharge recommendations: home health PT     Barriers to discharge: Inaccessible home environment, Decreased caregiver support    Equipment recommendations: wheelchair     GOALS:   Multidisciplinary Problems     Physical Therapy Goals        Problem: Physical Therapy Goal    Goal Priority Disciplines Outcome Goal Variances Interventions   Physical Therapy Goal     PT, PT/OT Ongoing, Progressing     Description:  Goals to be met by: 2020     Patient will increase functional independence with mobility by performin. Supine to sit with Modified [place]  2. Sit to supine with Modified [place]  3. Rolling to Left and Right with Modified [place].  4. Sit to stand transfer with Supervision  5. Bed to chair transfer with Supervision using Rolling Walker  6. Gait  x 150 feet with Supervision using Rolling Walker.   7. Wheelchair propulsion x150 feet with Supervision using bilateral uppper extremities  8. Ascend/descend 4 stair with bilateral Handrails Supervision  9. Ascend/Descend 4 inch curb step with Stand-by Assistance using Rolling Walker.  10. Stand for 3 minutes with Stand-by Assistance using Rolling Walker while performing a dynamic standing activity met 2020  11. Pt will perform a car transfer w/ RW and SBA  12. Pt will walk over uneven surface (outside ramp) w/ RW and CGA  13. Pt will  object from floor in a standing position w/ RW, use of a reacher, and SPV                        PLAN:    Patient to be seen 5 x/week  to address the above listed problems via therapeutic activities, gait training, therapeutic exercises  Plan of Care expires: 20  Plan of Care reviewed with: patient    Lila Angeles, PTA  2020

## 2020-04-08 NOTE — PROGRESS NOTES
"[facility] - Skilled Nursing Care  Adult Nutrition  Progress Note    SUMMARY       Recommendations    Recommendation/Intervention: Continue regular diet, RD following  Goals: Meet > 85 % EEN/EPN by RD f/u   Nutrition Goal Status: progressing towards goal  Communication of RD Recs: other (comment)(POC)    Reason for Assessment    Reason For Assessment: RD follow-up  Diagnosis: (respiratory failure)  Relevant Medical History: Anemia, CKD stage 3, HTN, Stroke, vascular dementia   Interdisciplinary Rounds: attended  General Information Comments: PO remains good at 100%  Nutrition Discharge Planning: Adequate PO intake of regular diet     Nutrition Risk Screen    Nutrition Risk Screen: no indicators present    Nutrition/Diet History    Spiritual, Cultural Beliefs, Pentecostalism Practices, Values that Affect Care: yes  Food Allergies: NKFA  Factors Affecting Nutritional Intake: None identified at this time    Anthropometrics    Temp: 98.5 °F (36.9 °C)  Height Method: Stated  Height: 5' 4" (162.6 cm)  Height (inches): 64 in  Weight Method: Bed Scale  Weight: 78.4 kg (172 lb 13.5 oz)  Weight (lb): 172.84 lb  Ideal Body Weight (IBW), Female: 120 lb  % Ideal Body Weight, Female (lb): 145.32 %  BMI (Calculated): 29.7  BMI Grade: 25 - 29.9 - overweight  Weight Loss: (wt loss from diuresis, 179# to 172#)  Usual Body Weight (UBW), k.36 kg(3/28/20)  % Usual Body Weight: 97.43  % Weight Change From Usual Weight: -2.78 %       Lab/Procedures/Meds    Pertinent Labs Reviewed: reviewed  Pertinent Labs Comments: BUN30  Pertinent Medications Reviewed: reviewed  Pertinent Medications Comments: ASA, statin, thiamine, prednisone,         Estimated/Assessed Needs    Weight Used For Calorie Calculations: 79.1 kg (174 lb 6.1 oz)  Energy Calorie Requirements (kcal): 1607 g/day  Energy Need Method: Mason-St Jeor(PAL 1.3)  Protein Requirements: 95 g/day(1.2 g/kg)  Weight Used For Protein Calculations: 79.1 kg (174 lb 6.1 oz)  Fluid Requirements " (mL): 1 mL/kcal or per MD   Estimated Fluid Requirement Method: RDA Method  RDA Method (mL): 1607         Nutrition Prescription Ordered    Current Diet Order: Regular  Nutrition Order Comments: rec for cardiac diet not accepted    Evaluation of Received Nutrient/Fluid Intake  Energy Calories Required: meeting needs  Protein Required: meeting needs  Fluid Required: meeting needs  Comments: LBM 3/30  Tolerance: tolerating  % Intake of Estimated Energy Needs: 75 - 100 %  % Meal Intake: 75 - 100 %    Nutrition Risk    Level of Risk/Frequency of Follow-up: low(one time per week)     Assessment and Plan     Nutrition Problem  Decreased nutrient needs (Na, fat)      Related to (etiology):   Past medical hx      Signs and Symptoms (as evidenced by):   HTN, stroke      Interventions  Mineral/fat modified diet      Nutrition Diagnosis Status:   Continues     Monitor and Evaluation    Food and Nutrient Intake: energy intake, food and beverage intake  Food and Nutrient Adminstration: diet order  Knowledge/Beliefs/Attitudes: food and nutrition knowledge/skill  Anthropometric Measurements: weight, weight change, body mass index  Biochemical Data, Medical Tests and Procedures: electrolyte and renal panel, glucose/endocrine profile  Nutrition-Focused Physical Findings: overall appearance     Malnutrition Assessment                                     Due to recent hospital wide restrictions to limit the transfer of (COVID-19), we are not performing any physical exams at this time. All S/S will be observational; NFPE to be performed at a future date.    Nutrition Follow-Up    RD Follow-up?: Yes

## 2020-04-08 NOTE — PT/OT/SLP PROGRESS
"Occupational Therapy  Treatment    Selma Lux   MRN: [id number]   Admitting Diagnosis: <principal problem not specified>    OT Date of Treatment: 04/08/20       Billable Minutes:  Self Care/Home Management 15 and Therapeutic Exercise 15    General Precautions: Standard, fall  Orthopedic Precautions: N/A  Braces: N/A    Spiritual, Cultural Beliefs, Adventist Practices, Values that Affect Care: no    Subjective:  Communicated with patient prior to session.  "Yes, you came back, thank you. I don't know why, I guess I just needed a little extra sleep today."    Pain/Comfort  Pain Rating 1: 4/10  Location - Side 1: Left  Location - Orientation 1: generalized  Location 1: shoulder  Pain Addressed 1: Reposition  Pain Rating Post-Intervention 1: 2/10    Objective:  Patient found with: (laying supine in bed)    Occupational Performance:    Bed Mobility:    · Patient completed Rolling/Turning to Left with  independence  · Patient completed Scooting/Bridging with independence  · Patient completed Supine to Sit with independence     Functional Mobility/Transfers:  · Patient completed Toilet Transfer Squat Pivot technique with modified independence with  bedside commode, grab bars and SBA to transfer from w/c to toilet and toilet to w/c  · Functional Mobility: N/A. Not tested    Activities of Daily Living:  · Grooming: modified independence w/ g/h @ sink while seated in w/c. Pt able to comb hair, wash face and bursh teeth with setup.   · Toileting: modified independence and supervision while transferring from w/c to toilet w/ grab bars and transferring from toilet to w/c w/ grab bars.    [facility] 6 Click:  AMPA Total Score: 20    OT Exercises: AROM of BUE digit flex/ext, wrist flex/ext, elbow flex/ext and BUE single arm reach OH 2x10 reps each. PROM left shoulde rin all available plans per Pt request.        Patient left sitting in w/c with call button in reach, needs within reach and in NAD.    ASSESSMENT:  Selmajennifer Rosado" Jose J is a 84 y.o. female with a medical diagnosis of <principal problem not specified>. Pt w/ more fatigue today than yesterday but still agreeable to therapy session and with good effort and participation. Pt w/ Juan Miguel for transfers. Educated Pt on importance of adhering to fall risk safety when no help is available. Pt doing well managing ADL tasks. Pt will benefit from continued OT POC.     Rehab identified problem list/impairments: weakness, impaired endurance, impaired self care skills, impaired functional mobilty, gait instability, impaired balance, decreased upper extremity function, decreased ROM    Rehab potential is fair    Activity tolerance: Fair    Discharge recommendations: home with home health     Barriers to discharge: Decreased caregiver support, Inaccessible home environment     Equipment recommendations: bath bench     GOALS:   Multidisciplinary Problems     Occupational Therapy Goals        Problem: Occupational Therapy Goal    Goal Priority Disciplines Outcome Interventions   Occupational Therapy Goal     OT, PT/OT Ongoing, Progressing    Description:  Goals to be met by:  4/8/2020    Patient will increase functional independence with ADLs by performing:    UE Dressing with Modified [place].=MET 4/3/2020  LE Dressing with Modified [place].=MET 4/3/2020  Grooming while standing at sink with Modified [place].=MET 4/3/2020  Toileting from toilet with Modified [place] for hygiene and clothing management.   Bathing from shower chair/bench with Modified [place].  Supine to sit with Modified [place] with HOB flat and no handrails.  Stand pivot transfers with Modified [place].  Toilet transfer to toilet with Modified [place].=MET 4/3/2020  Upper extremity exercise program 3 x 15 reps, with independence.  Patient will complete a dynamic standing activity for 8 min in order to perform household tasks.                       Plan:  Patient to be seen 3 x/week to  address the above listed problems via self-care/home management, therapeutic activities, therapeutic exercises  Plan of Care expires: 04/28/20  Plan of Care reviewed with: patient    GENE Schneider  04/08/2020

## 2020-04-08 NOTE — PLAN OF CARE
Problem: Physical Therapy Goal  Goal: Physical Therapy Goal  Description  Goals to be met by: 2020     Patient will increase functional independence with mobility by performin. Supine to sit with Modified [place]  2. Sit to supine with Modified [place]  3. Rolling to Left and Right with Modified [place].  4. Sit to stand transfer with Supervision  5. Bed to chair transfer with Supervision using Rolling Walker  6. Gait  x 150 feet with Supervision using Rolling Walker.   7. Wheelchair propulsion x150 feet with Supervision using bilateral uppper extremities  8. Ascend/descend 4 stair with bilateral Handrails Supervision  9. Ascend/Descend 4 inch curb step with Stand-by Assistance using Rolling Walker.  10. Stand for 3 minutes with Stand-by Assistance using Rolling Walker while performing a dynamic standing activity met 2020  11. Pt will perform a car transfer w/ RW and SBA  12. Pt will walk over uneven surface (outside ramp) w/ RW and CGA  13. Pt will  object from floor in a standing position w/ RW, use of a reacher, and SPV       Outcome: Ongoing, Progressing

## 2020-04-08 NOTE — PLAN OF CARE
Problem: Wound  Goal: Optimal Wound Healing  Outcome: Ongoing, Progressing     Problem: Adult Inpatient Plan of Care  Goal: Plan of Care Review  Outcome: Ongoing, Progressing     Problem: Adult Inpatient Plan of Care  Goal: Patient-Specific Goal (Individualization)  Outcome: Ongoing, Progressing     Problem: Adult Inpatient Plan of Care  Goal: Absence of Hospital-Acquired Illness or Injury  Outcome: Ongoing, Progressing     Problem: Adult Inpatient Plan of Care  Goal: Readiness for Transition of Care  Outcome: Ongoing, Progressing

## 2020-04-09 PROCEDURE — 97110 THERAPEUTIC EXERCISES: CPT | Mod: CQ

## 2020-04-09 PROCEDURE — 97130 THER IVNTJ EA ADDL 15 MIN: CPT

## 2020-04-09 PROCEDURE — 97530 THERAPEUTIC ACTIVITIES: CPT | Mod: CQ

## 2020-04-09 PROCEDURE — 97116 GAIT TRAINING THERAPY: CPT | Mod: CQ

## 2020-04-09 PROCEDURE — 97129 THER IVNTJ 1ST 15 MIN: CPT

## 2020-04-09 PROCEDURE — 11000004 HC SNF PRIVATE

## 2020-04-09 PROCEDURE — 25000003 PHARM REV CODE 250: Performed by: INTERNAL MEDICINE

## 2020-04-09 PROCEDURE — 63600175 PHARM REV CODE 636 W HCPCS: Performed by: INTERNAL MEDICINE

## 2020-04-09 RX ADMIN — ACETAMINOPHEN 650 MG: 325 TABLET ORAL at 03:04

## 2020-04-09 RX ADMIN — HYDROXYCHLOROQUINE SULFATE 200 MG: 200 TABLET, FILM COATED ORAL at 09:04

## 2020-04-09 RX ADMIN — MONTELUKAST 10 MG: 10 TABLET, FILM COATED ORAL at 09:04

## 2020-04-09 RX ADMIN — MYCOPHENOLATE MOFETIL 500 MG: 250 CAPSULE ORAL at 11:04

## 2020-04-09 RX ADMIN — FUROSEMIDE 40 MG: 40 TABLET ORAL at 11:04

## 2020-04-09 RX ADMIN — Medication 100 MG: at 11:04

## 2020-04-09 RX ADMIN — Medication 9 MG: at 09:04

## 2020-04-09 RX ADMIN — PANTOPRAZOLE SODIUM 20 MG: 20 TABLET, DELAYED RELEASE ORAL at 06:04

## 2020-04-09 RX ADMIN — BACLOFEN 10 MG: 10 TABLET ORAL at 11:04

## 2020-04-09 RX ADMIN — HYDROCODONE BITARTRATE AND ACETAMINOPHEN 1 TABLET: 5; 325 TABLET ORAL at 09:04

## 2020-04-09 RX ADMIN — Medication 400 MG: at 11:04

## 2020-04-09 RX ADMIN — ATORVASTATIN CALCIUM 40 MG: 20 TABLET, FILM COATED ORAL at 09:04

## 2020-04-09 RX ADMIN — PREDNISONE 20 MG: 20 TABLET ORAL at 11:04

## 2020-04-09 RX ADMIN — ASPIRIN 81 MG: 81 TABLET, COATED ORAL at 11:04

## 2020-04-09 RX ADMIN — HYDROXYCHLOROQUINE SULFATE 200 MG: 200 TABLET, FILM COATED ORAL at 11:04

## 2020-04-09 RX ADMIN — AMLODIPINE BESYLATE 10 MG: 10 TABLET ORAL at 11:04

## 2020-04-09 RX ADMIN — BACLOFEN 10 MG: 10 TABLET ORAL at 09:04

## 2020-04-09 RX ADMIN — MYCOPHENOLATE MOFETIL 500 MG: 250 CAPSULE ORAL at 09:04

## 2020-04-09 RX ADMIN — ENOXAPARIN SODIUM 40 MG: 100 INJECTION SUBCUTANEOUS at 05:04

## 2020-04-09 NOTE — PLAN OF CARE
Problem: Physical Therapy Goal  Goal: Physical Therapy Goal  Description  Goals to be met by: 2020     Patient will increase functional independence with mobility by performin. Supine to sit with Modified [place]  2. Sit to supine with Modified [place]  3. Rolling to Left and Right with Modified [place].  4. Sit to stand transfer with Supervision met 2020  5. Bed to chair transfer with Supervision using Rolling Walker  6. Gait  x 150 feet with Supervision using Rolling Walker.   7. Wheelchair propulsion x150 feet with Supervision using bilateral uppper extremities met 2020  8. Ascend/descend 4 stair with bilateral Handrails Supervision met 2020  9. Ascend/Descend 4 inch curb step with Stand-by Assistance using Rolling Walker.  10. Stand for 3 minutes with Stand-by Assistance using Rolling Walker while performing a dynamic standing activity met 2020  11. Pt will perform a car transfer w/ RW and SBA  12. Pt will walk over uneven surface (outside ramp) w/ RW and CGA  13. Pt will  object from floor in a standing position w/ RW, use of a reacher, and SPV met 2020        Outcome: Ongoing, Progressing

## 2020-04-09 NOTE — PT/OT/SLP PROGRESS
"Physical Therapy  Treatment    Selma Lux   MRN: [id number]   Admitting Diagnosis: <principal problem not specified>    PT Received On: 04/09/20          Billable Minutes:  Gait Training 12, Therapeutic Activity 20 and Therapeutic Exercise 8    Treatment Type: Treatment  PT/PTA: PTA     PTA Visit Number: 4       General Precautions: Standard, fall  Orthopedic Precautions: N/A   Braces: N/A    Spiritual, Cultural Beliefs, Mormonism Practices, Values that Affect Care: no    Subjective:  "I'm good" Pt agreebale to therapy    Pain/Comfort  Pain Rating 1: 0/10  Pain Rating Post-Intervention 1: 0/10    Objective:  Patient found in wc in room        AM-PAC 6 CLICK MOBILITY  Total Score:18      Transfers:  Sit<>Stand: t/f wc S RW    Gait:  Amb 150' RW S     Advanced Gait:  Stairs: 4 steps S BHR  Curb Step: 4" RW SBA    Wheelchair Mobility:  Patient propels w/c 150' BUE and BLE S     Therex:  2 x 10 AP, LAQ, HF, abd/add, GS    Balance:  No LOB noted throughout session    Additional Treatment:  Picked up object with reacher unilateral UE support RW x 4 objects S    Patient left up in chair with call button in reach.    Assessment:  Selma Lux is a 84 y.o. female with a medical diagnosis of <principal problem not specified>.  Patient tolerated treatment well focusing on transfers, gait and therex. Patient will continue to improve with skilled physical therapy services in order to return to functional baseline.    Rehab identified problem list/impairments: impaired endurance, impaired self care skills, impaired functional mobilty, gait instability, impaired balance, decreased upper extremity function    Rehab potential is good.    Activity tolerance: Good    Discharge recommendations: home health PT     Barriers to discharge: Inaccessible home environment, Decreased caregiver support    Equipment recommendations: wheelchair     GOALS:   Multidisciplinary Problems     Physical Therapy Goals        Problem: " Physical Therapy Goal    Goal Priority Disciplines Outcome Goal Variances Interventions   Physical Therapy Goal     PT, PT/OT Ongoing, Progressing     Description:  Goals to be met by: 2020     Patient will increase functional independence with mobility by performin. Supine to sit with Modified [place]  2. Sit to supine with Modified [place]  3. Rolling to Left and Right with Modified [place].  4. Sit to stand transfer with Supervision met 2020  5. Bed to chair transfer with Supervision using Rolling Walker  6. Gait  x 150 feet with Supervision using Rolling Walker.   7. Wheelchair propulsion x150 feet with Supervision using bilateral uppper extremities met 2020  8. Ascend/descend 4 stair with bilateral Handrails Supervision met 2020  9. Ascend/Descend 4 inch curb step with Stand-by Assistance using Rolling Walker.  10. Stand for 3 minutes with Stand-by Assistance using Rolling Walker while performing a dynamic standing activity met 2020  11. Pt will perform a car transfer w/ RW and SBA  12. Pt will walk over uneven surface (outside ramp) w/ RW and CGA  13. Pt will  object from floor in a standing position w/ RW, use of a reacher, and SPV met 2020                         PLAN:    Patient to be seen 5 x/week  to address the above listed problems via therapeutic activities, gait training, therapeutic exercises  Plan of Care expires: 20  Plan of Care reviewed with: patient    Lila Angeles, PTA  2020

## 2020-04-09 NOTE — PT/OT/SLP PROGRESS
Speech Language Pathology  Treatment    Selma Lux   MRN: [id number]   Admitting Diagnosis: <principal problem not specified>    Diet recommendations: Solid Diet Level: Regular  Liquid Diet Level: Thin Standard aspiration precautions    SLP Treatment Date: 04/09/20  Speech Start Time: 1224 / 1256     Speech Stop Time: 1232 / 1319     Speech Total (min): 31 min       TREATMENT BILLABLE MINUTES:  Speech Therapy Individual 31    Has the patient been evaluated by SLP for swallowing? : No  Keep patient NPO?: No   General Precautions: Standard, fall  Current Respiratory Status: room air       Subjective:  Patient awake and alert during session    Objective:   Patient seen for ongoing cognitive therapy. She was sitting up in a wheelchair during session. Session was spilt between two visits 2' to patient's lunch arriving. She recalled 1/4 unrelated objects independently, increasing to 3/4 with max assist. She was 3/5 on abstract word finding tasks with mod assist and she was 2/4 on abstract thinking tasks with max assist. She answered 4-part mental manipulation tasks with 40% accuracy and required consistent cueing and repetition throughout tasks. Patient demonstrated appropriate interaction during surface level conversation, but once the interaction became more complex her though process slowed significantly. SLP educated patient regarding role of ST in her POC and she verbalized understanding.     Assessment:  Selma Lux is a 84 y.o. female with a medical diagnosis of respiratory failure and presents with a cognitive-linguistic impairment.    Diet recommendations:        Liquid Diet Level: Thin    Standard aspiration precautions    Discharge recommendations: Discharge Facility/Level of Care Needs: home health speech therapy     Goals:   Multidisciplinary Problems     SLP Goals        Problem: SLP Goal    Goal Priority Disciplines Outcome   SLP Goal     SLP Ongoing, Progressing   Description:  Updated  Speech Therapy Short Term Goals  Goal expected to be met by 4/21  1. Pt will recall functional information after a 5+ minute delay with 80% acc min cues.   2. Pt will follow 3-step commands with 70% accuracy given mod cues.  3. Pt will complete moderate level problem solving tasks with 70% accuracy given min-mod cues.   4. Pt will complete mental manipulation tasks with 75% accuracy given mod cues.  5. Pt will complete sustained attention tasks with 80% accuracy given mod cues.   6. Pt will participate in further assessment of reading, writing, and visual spatial abilities.     Speech Language Pathology Goals  Goals expected to be met by 4/6:  1. Pt will recall 3/3 words or facts after a 2 minute delay given min cues. -met  2. Pt will follow 3-step commands with 70% accuracy given mod cues. -ongoing  3. Pt will complete moderate level problem solving tasks with 70% accuracy given min-mod cues. -ongoing  4. Pt will complete mental manipulation tasks with 70% accuracy given mod cues. -met  5. Pt will complete sustained attention tasks with 70% accuracy given mod cues. -met  6. Pt will participate in further assessment of reading, writing, and visual spatial abilities. -ongoing                              Plan:   Patient to be seen Therapy Frequency: 3 x/week  Planned Interventions: Cognitive-Linguistic Therapy  Plan of Care expires: 04/30/20  Plan of Care reviewed with: patient  SLP Follow-up?: Yes  SLP - Next Visit Date: 04/13/20         Porfirio Khoury CCC-SLP  Speech-Language Pathology  Pager: [phone]   4/09/2020

## 2020-04-09 NOTE — PLAN OF CARE
Problem: SLP Goal  Goal: SLP Goal  Description  Updated Speech Therapy Short Term Goals  Goal expected to be met by 4/21  1. Pt will recall functional information after a 5+ minute delay with 80% acc min cues.   2. Pt will follow 3-step commands with 70% accuracy given mod cues.  3. Pt will complete moderate level problem solving tasks with 70% accuracy given min-mod cues.   4. Pt will complete mental manipulation tasks with 75% accuracy given mod cues.  5. Pt will complete sustained attention tasks with 80% accuracy given mod cues.   6. Pt will participate in further assessment of reading, writing, and visual spatial abilities.     Speech Language Pathology Goals  Goals expected to be met by 4/6:  1. Pt will recall 3/3 words or facts after a 2 minute delay given min cues. -met  2. Pt will follow 3-step commands with 70% accuracy given mod cues. -ongoing  3. Pt will complete moderate level problem solving tasks with 70% accuracy given min-mod cues. -ongoing  4. Pt will complete mental manipulation tasks with 70% accuracy given mod cues. -met  5. Pt will complete sustained attention tasks with 70% accuracy given mod cues. -met  6. Pt will participate in further assessment of reading, writing, and visual spatial abilities. -ongoing    Outcome: Ongoing, Progressing     Patient seen for ongoing cognitive therapy.     Porfirio Khoury CCC-SLP  Speech-Language Pathology  Pager: [phone]

## 2020-04-09 NOTE — NURSING
Pt up in w/c in room, refused her medications at this time. Said she was too anxious after ADL's. VS WNL Will call when ready for meds

## 2020-04-10 PROCEDURE — 25000003 PHARM REV CODE 250: Performed by: INTERNAL MEDICINE

## 2020-04-10 PROCEDURE — 97535 SELF CARE MNGMENT TRAINING: CPT | Mod: CO

## 2020-04-10 PROCEDURE — 97116 GAIT TRAINING THERAPY: CPT | Mod: CQ

## 2020-04-10 PROCEDURE — 63600175 PHARM REV CODE 636 W HCPCS: Performed by: INTERNAL MEDICINE

## 2020-04-10 PROCEDURE — 97530 THERAPEUTIC ACTIVITIES: CPT | Mod: CQ

## 2020-04-10 PROCEDURE — 11000004 HC SNF PRIVATE

## 2020-04-10 PROCEDURE — 97110 THERAPEUTIC EXERCISES: CPT | Mod: CQ

## 2020-04-10 RX ADMIN — PREDNISONE 20 MG: 20 TABLET ORAL at 02:04

## 2020-04-10 RX ADMIN — STANDARDIZED SENNA CONCENTRATE AND DOCUSATE SODIUM 1 TABLET: 8.6; 5 TABLET ORAL at 02:04

## 2020-04-10 RX ADMIN — Medication 9 MG: at 08:04

## 2020-04-10 RX ADMIN — FUROSEMIDE 40 MG: 40 TABLET ORAL at 09:04

## 2020-04-10 RX ADMIN — MYCOPHENOLATE MOFETIL 500 MG: 250 CAPSULE ORAL at 08:04

## 2020-04-10 RX ADMIN — SULFAMETHOXAZOLE AND TRIMETHOPRIM 1 TABLET: 800; 160 TABLET ORAL at 09:04

## 2020-04-10 RX ADMIN — MYCOPHENOLATE MOFETIL 500 MG: 250 CAPSULE ORAL at 09:04

## 2020-04-10 RX ADMIN — HYDROCODONE BITARTRATE AND ACETAMINOPHEN 1 TABLET: 5; 325 TABLET ORAL at 08:04

## 2020-04-10 RX ADMIN — MONTELUKAST 10 MG: 10 TABLET, FILM COATED ORAL at 08:04

## 2020-04-10 RX ADMIN — ATORVASTATIN CALCIUM 40 MG: 20 TABLET, FILM COATED ORAL at 08:04

## 2020-04-10 RX ADMIN — AMLODIPINE BESYLATE 10 MG: 10 TABLET ORAL at 09:04

## 2020-04-10 RX ADMIN — HYDROXYCHLOROQUINE SULFATE 200 MG: 200 TABLET, FILM COATED ORAL at 08:04

## 2020-04-10 RX ADMIN — Medication 400 MG: at 02:04

## 2020-04-10 RX ADMIN — HYDROXYCHLOROQUINE SULFATE 200 MG: 200 TABLET, FILM COATED ORAL at 09:04

## 2020-04-10 RX ADMIN — BACLOFEN 10 MG: 10 TABLET ORAL at 09:04

## 2020-04-10 RX ADMIN — Medication 100 MG: at 02:04

## 2020-04-10 RX ADMIN — ENOXAPARIN SODIUM 40 MG: 100 INJECTION SUBCUTANEOUS at 05:04

## 2020-04-10 RX ADMIN — ASPIRIN 81 MG: 81 TABLET, COATED ORAL at 12:04

## 2020-04-10 RX ADMIN — BACLOFEN 10 MG: 10 TABLET ORAL at 08:04

## 2020-04-10 RX ADMIN — PANTOPRAZOLE SODIUM 20 MG: 20 TABLET, DELAYED RELEASE ORAL at 06:04

## 2020-04-10 NOTE — PT/OT/SLP PROGRESS
"Occupational Therapy  Treatment    Selma Lux   MRN: [id number]   Admitting Diagnosis: Respiratory failure    OT Date of Treatment: 04/10/20  Total Time (min): 44 min    Billable Minutes:  Self Care/Home Management 44    General Precautions: Standard, fall  Orthopedic Precautions: N/A  Braces: N/A    Spiritual, Cultural Beliefs, [employer]ian Practices, Values that Affect Care: no    Subjective:  Communicated with patient and nurse prior to session.  "Oh wonderful." Re: you're going to take a shower after you finish eating your breakfast.          Objective:    Pt found sitting in w/c finishing her breakfast.       Occupational Performance:    Bed Mobility:    · Not tested    Functional Mobility/Transfers:  · Patient completed  Shower Transfer Step Transfer technique with modified independence with grab bars and locked w/c and shower bench  · Functional Mobility: Pt performed sit>stand for LB dressing w/ SBA.    Activities of Daily Living:  · Bathing: minimum assistance -Tamir to asist w/ washing back and feet.  · Upper Body Dressing: minimum assistance -Tamir to assist with getting brassiere overhead and to insert L arm into sleeve.  · Lower Body Dressing: contact guard assistance while standing to pull up LBD    AMPA 6 Click:  [facility] Total Score: 20    OT Exercises: not completed    Additional Treatment:  -Pt educated on role of OT per POC      Patient left seated in wheelchair with call button in reach    ASSESSMENT:  Selma Lux is a 84 y.o. female with a medical diagnosis of Respiratory failure Pt agreeable to Tx. Pt tolerated today's Tx well. Pt progressing well towards goals. Pt w/ good understanding of OT's role per her POC. Pt will cont to benefit from cont OT per POC to maximize independence w/ self care and mobility. .    Rehab identified problem list/impairments: weakness, impaired endurance, impaired self care skills, impaired functional mobilty, gait instability, impaired balance, decreased " upper extremity function, decreased ROM    Rehab potential is good    Activity tolerance: Good    Discharge recommendations: home with home health     Barriers to discharge: Decreased caregiver support, Inaccessible home environment     Equipment recommendations: bath bench     GOALS:   Multidisciplinary Problems     Occupational Therapy Goals        Problem: Occupational Therapy Goal    Goal Priority Disciplines Outcome Interventions   Occupational Therapy Goal     OT, PT/OT Ongoing, Progressing    Description:  Goals to be met by:  4/8/2020    Patient will increase functional independence with ADLs by performing:    UE Dressing with Modified [place].=MET 4/3/2020  LE Dressing with Modified [place].=MET 4/3/2020  Grooming while standing at sink with Modified [place].=MET 4/3/2020  Toileting from toilet with Modified [place] for hygiene and clothing management.   Bathing from shower chair/bench with Modified [place].  Supine to sit with Modified [place] with HOB flat and no handrails.  Stand pivot transfers with Modified [place].  Toilet transfer to toilet with Modified [place].=MET 4/3/2020  Upper extremity exercise program 3 x 15 reps, with independence.  Patient will complete a dynamic standing activity for 8 min in order to perform household tasks.                       Plan:  Patient to be seen 3 x/week to address the above listed problems via self-care/home management, therapeutic activities, therapeutic exercises  Plan of Care expires: 04/28/20  Plan of Care reviewed with: patient    GENE Schneider  04/10/2020

## 2020-04-10 NOTE — PLAN OF CARE
Problem: Wound  Goal: Optimal Wound Healing  Outcome: Ongoing, Progressing     Problem: Adult Inpatient Plan of Care  Goal: Plan of Care Review  Outcome: Ongoing, Progressing  Goal: Patient-Specific Goal (Individualization)  Outcome: Ongoing, Progressing  Goal: Absence of Hospital-Acquired Illness or Injury  Outcome: Ongoing, Progressing  Goal: Optimal Comfort and Wellbeing  Outcome: Ongoing, Progressing  Goal: Readiness for Transition of Care  Outcome: Ongoing, Progressing  Goal: Rounds/Family Conference  Outcome: Ongoing, Progressing     Problem: Fall Injury Risk  Goal: Absence of Fall and Fall-Related Injury  Outcome: Ongoing, Progressing     Problem: Skin Injury Risk Increased  Goal: Skin Health and Integrity  Outcome: Ongoing, Progressing

## 2020-04-10 NOTE — PLAN OF CARE
Problem: Wound  Goal: Optimal Wound Healing  Outcome: Ongoing, Progressing     Problem: Adult Inpatient Plan of Care  Goal: Plan of Care Review  Outcome: Ongoing, Progressing  Goal: Patient-Specific Goal (Individualization)  Outcome: Ongoing, Progressing  Goal: Optimal Comfort and Wellbeing  Outcome: Ongoing, Progressing     Problem: Fall Injury Risk  Goal: Absence of Fall and Fall-Related Injury  Outcome: Ongoing, Progressing     Problem: Skin Injury Risk Increased  Goal: Skin Health and Integrity  Outcome: Ongoing, Progressing     Afebrile. Pain medication effective. Repositions with minimal assist. No new skin wounds. Medication regimen reviewed with patient, patient verbalized understanding. Will continue to monitor for pain and safety.

## 2020-04-10 NOTE — PT/OT/SLP PROGRESS
Physical Therapy  Treatment    Selma Lux   MRN: [id number]   Admitting Diagnosis: Respiratory failure    PT Received On: 04/10/20  Total Time (min): (--)       Billable Minutes:  Gait Training 10, Therapeutic Activity 10 and Therapeutic Exercise 18    Treatment Type: Treatment  PT/PTA: PTA     PTA Visit Number: 5       General Precautions: Standard, fall  Orthopedic Precautions: N/A   Braces: N/A    Spiritual, Cultural Beliefs, Zoroastrianism Practices, Values that Affect Care: no    Subjective:  Communicated with nursing prior to session.  Pt agreed to work with therapy.     Pain/Comfort  Pain Rating 1: 0/10  Pain Rating Post-Intervention 1: 0/10    Objective:  Patient found seated w/c.       AM-PAC 6 CLICK MOBILITY  Total Score:18    Bed Mobility:  Sit>Supine:np  Supine>Sit: np    Transfers:  Sit<>Stand: RW w/ SPV  Stand Pivot Transfer: RW w/ SPV    Gait:  Amb 152ft w/ RW and SPV     Therex:  BLE therex 2x10 reps:    -AP   -LAQ   -Hip flexion    -GS   -Hip abd/add    Additional Treatment:  -UBE x15 min    Patient left up in chair with call button in reach and nursing notified.    Assessment:  Selma Lux is a 84 y.o. female with a medical diagnosis of Respiratory failure.  Pt tolerated treatment well, and will continue to benefit from PT services at this time. Continue with PT POC as indicated.    Rehab identified problem list/impairments: impaired endurance, impaired self care skills, impaired functional mobilty, gait instability, impaired balance, decreased upper extremity function    Rehab potential is good.    Activity tolerance: Good    Discharge recommendations: home health PT     Barriers to discharge: Inaccessible home environment, Decreased caregiver support    Equipment recommendations: wheelchair     GOALS:   Multidisciplinary Problems     Physical Therapy Goals        Problem: Physical Therapy Goal    Goal Priority Disciplines Outcome Goal Variances Interventions   Physical Therapy Goal      PT, PT/OT Ongoing, Progressing     Description:  Goals to be met by: 2020     Patient will increase functional independence with mobility by performin. Supine to sit with Modified [place]  2. Sit to supine with Modified [place]  3. Rolling to Left and Right with Modified [place].  4. Sit to stand transfer with Supervision met 2020  5. Bed to chair transfer with Supervision using Rolling Walker  6. Gait  x 150 feet with Supervision using Rolling Walker.   7. Wheelchair propulsion x150 feet with Supervision using bilateral uppper extremities met 2020  8. Ascend/descend 4 stair with bilateral Handrails Supervision met 2020  9. Ascend/Descend 4 inch curb step with Stand-by Assistance using Rolling Walker.  10. Stand for 3 minutes with Stand-by Assistance using Rolling Walker while performing a dynamic standing activity met 2020  11. Pt will perform a car transfer w/ RW and SBA  12. Pt will walk over uneven surface (outside ramp) w/ RW and CGA  13. Pt will  object from floor in a standing position w/ RW, use of a reacher, and SPV met 2020                         PLAN:    Patient to be seen 5 x/week  to address the above listed problems via therapeutic activities, gait training, therapeutic exercises  Plan of Care expires: 20  Plan of Care reviewed with: patient    Alyssa Raudel, PTA  04/10/2020

## 2020-04-11 PROCEDURE — 25000003 PHARM REV CODE 250: Performed by: INTERNAL MEDICINE

## 2020-04-11 PROCEDURE — 11000004 HC SNF PRIVATE

## 2020-04-11 PROCEDURE — 63600175 PHARM REV CODE 636 W HCPCS: Performed by: INTERNAL MEDICINE

## 2020-04-11 RX ADMIN — PANTOPRAZOLE SODIUM 20 MG: 20 TABLET, DELAYED RELEASE ORAL at 05:04

## 2020-04-11 RX ADMIN — MYCOPHENOLATE MOFETIL 500 MG: 250 CAPSULE ORAL at 09:04

## 2020-04-11 RX ADMIN — ENOXAPARIN SODIUM 40 MG: 100 INJECTION SUBCUTANEOUS at 05:04

## 2020-04-11 RX ADMIN — AMLODIPINE BESYLATE 10 MG: 10 TABLET ORAL at 09:04

## 2020-04-11 RX ADMIN — Medication 9 MG: at 09:04

## 2020-04-11 RX ADMIN — BACLOFEN 10 MG: 10 TABLET ORAL at 09:04

## 2020-04-11 RX ADMIN — Medication 400 MG: at 12:04

## 2020-04-11 RX ADMIN — ATORVASTATIN CALCIUM 40 MG: 20 TABLET, FILM COATED ORAL at 09:04

## 2020-04-11 RX ADMIN — ERGOCALCIFEROL 50000 UNITS: 1.25 CAPSULE ORAL at 09:04

## 2020-04-11 RX ADMIN — FUROSEMIDE 40 MG: 40 TABLET ORAL at 09:04

## 2020-04-11 RX ADMIN — ASPIRIN 81 MG: 81 TABLET, COATED ORAL at 12:04

## 2020-04-11 RX ADMIN — HYDROXYCHLOROQUINE SULFATE 200 MG: 200 TABLET, FILM COATED ORAL at 09:04

## 2020-04-11 RX ADMIN — MONTELUKAST 10 MG: 10 TABLET, FILM COATED ORAL at 09:04

## 2020-04-11 RX ADMIN — Medication 100 MG: at 12:04

## 2020-04-11 RX ADMIN — HYDROCODONE BITARTRATE AND ACETAMINOPHEN 1 TABLET: 5; 325 TABLET ORAL at 09:04

## 2020-04-11 RX ADMIN — PREDNISONE 20 MG: 20 TABLET ORAL at 12:04

## 2020-04-11 RX ADMIN — STANDARDIZED SENNA CONCENTRATE AND DOCUSATE SODIUM 1 TABLET: 8.6; 5 TABLET ORAL at 11:04

## 2020-04-11 NOTE — PLAN OF CARE
Problem: Wound  Goal: Optimal Wound Healing  Outcome: Ongoing, Progressing     Problem: Adult Inpatient Plan of Care  Goal: Plan of Care Review  Outcome: Ongoing, Progressing  Goal: Patient-Specific Goal (Individualization)  Outcome: Ongoing, Progressing  Goal: Absence of Hospital-Acquired Illness or Injury  Outcome: Ongoing, Progressing  Goal: Optimal Comfort and Wellbeing  Outcome: Ongoing, Progressing  Goal: Readiness for Transition of Care  Outcome: Ongoing, Progressing  Goal: Rounds/Family Conference  Outcome: Ongoing, Progressing     Problem: Fall Injury Risk  Goal: Absence of Fall and Fall-Related Injury  Outcome: Ongoing, Progressing     Problem: Skin Injury Risk Increased  Goal: Skin Health and Integrity  Outcome: Ongoing, Progressing     Problem: COPD Comorbidity  Goal: Maintenance of COPD Symptom Control  Outcome: Ongoing, Progressing     Problem: Heart Failure Comorbidity  Goal: Maintenance of Heart Failure Symptom Control  Outcome: Ongoing, Progressing     Problem: Hypertension Comorbidity  Goal: Blood Pressure in Desired Range  Outcome: Ongoing, Progressing

## 2020-04-12 PROCEDURE — 25000003 PHARM REV CODE 250: Performed by: INTERNAL MEDICINE

## 2020-04-12 PROCEDURE — 63600175 PHARM REV CODE 636 W HCPCS: Performed by: INTERNAL MEDICINE

## 2020-04-12 PROCEDURE — 11000004 HC SNF PRIVATE

## 2020-04-12 RX ADMIN — BACLOFEN 10 MG: 10 TABLET ORAL at 09:04

## 2020-04-12 RX ADMIN — FUROSEMIDE 40 MG: 40 TABLET ORAL at 09:04

## 2020-04-12 RX ADMIN — Medication 100 MG: at 11:04

## 2020-04-12 RX ADMIN — Medication 9 MG: at 09:04

## 2020-04-12 RX ADMIN — AMLODIPINE BESYLATE 10 MG: 10 TABLET ORAL at 09:04

## 2020-04-12 RX ADMIN — ENOXAPARIN SODIUM 40 MG: 100 INJECTION SUBCUTANEOUS at 05:04

## 2020-04-12 RX ADMIN — HYDROXYCHLOROQUINE SULFATE 200 MG: 200 TABLET, FILM COATED ORAL at 09:04

## 2020-04-12 RX ADMIN — PREDNISONE 20 MG: 20 TABLET ORAL at 11:04

## 2020-04-12 RX ADMIN — MYCOPHENOLATE MOFETIL 500 MG: 250 CAPSULE ORAL at 12:04

## 2020-04-12 RX ADMIN — MONTELUKAST 10 MG: 10 TABLET, FILM COATED ORAL at 09:04

## 2020-04-12 RX ADMIN — Medication 400 MG: at 11:04

## 2020-04-12 RX ADMIN — ATORVASTATIN CALCIUM 40 MG: 20 TABLET, FILM COATED ORAL at 09:04

## 2020-04-12 RX ADMIN — ASPIRIN 81 MG: 81 TABLET, COATED ORAL at 11:04

## 2020-04-12 RX ADMIN — PANTOPRAZOLE SODIUM 20 MG: 20 TABLET, DELAYED RELEASE ORAL at 05:04

## 2020-04-12 RX ADMIN — STANDARDIZED SENNA CONCENTRATE AND DOCUSATE SODIUM 1 TABLET: 8.6; 5 TABLET ORAL at 11:04

## 2020-04-12 RX ADMIN — MYCOPHENOLATE MOFETIL 500 MG: 250 CAPSULE ORAL at 09:04

## 2020-04-12 RX ADMIN — HYDROCODONE BITARTRATE AND ACETAMINOPHEN 1 TABLET: 5; 325 TABLET ORAL at 09:04

## 2020-04-13 ENCOUNTER — TELEPHONE (OUTPATIENT)
Dept: PULMONOLOGY | Facility: CLINIC | Age: 85
End: 2020-04-13

## 2020-04-13 PROCEDURE — 97530 THERAPEUTIC ACTIVITIES: CPT

## 2020-04-13 PROCEDURE — 97110 THERAPEUTIC EXERCISES: CPT

## 2020-04-13 PROCEDURE — 97129 THER IVNTJ 1ST 15 MIN: CPT

## 2020-04-13 PROCEDURE — 11000004 HC SNF PRIVATE

## 2020-04-13 PROCEDURE — 97130 THER IVNTJ EA ADDL 15 MIN: CPT

## 2020-04-13 PROCEDURE — 63600175 PHARM REV CODE 636 W HCPCS: Performed by: INTERNAL MEDICINE

## 2020-04-13 PROCEDURE — 25000003 PHARM REV CODE 250: Performed by: INTERNAL MEDICINE

## 2020-04-13 PROCEDURE — 97116 GAIT TRAINING THERAPY: CPT

## 2020-04-13 RX ADMIN — HYDROXYCHLOROQUINE SULFATE 200 MG: 200 TABLET, FILM COATED ORAL at 10:04

## 2020-04-13 RX ADMIN — MONTELUKAST 10 MG: 10 TABLET, FILM COATED ORAL at 08:04

## 2020-04-13 RX ADMIN — STANDARDIZED SENNA CONCENTRATE AND DOCUSATE SODIUM 1 TABLET: 8.6; 5 TABLET ORAL at 12:04

## 2020-04-13 RX ADMIN — MYCOPHENOLATE MOFETIL 500 MG: 250 CAPSULE ORAL at 08:04

## 2020-04-13 RX ADMIN — ASPIRIN 81 MG: 81 TABLET, COATED ORAL at 12:04

## 2020-04-13 RX ADMIN — FUROSEMIDE 40 MG: 40 TABLET ORAL at 10:04

## 2020-04-13 RX ADMIN — PREDNISONE 20 MG: 20 TABLET ORAL at 12:04

## 2020-04-13 RX ADMIN — ENOXAPARIN SODIUM 40 MG: 100 INJECTION SUBCUTANEOUS at 06:04

## 2020-04-13 RX ADMIN — BACLOFEN 10 MG: 10 TABLET ORAL at 10:04

## 2020-04-13 RX ADMIN — ATORVASTATIN CALCIUM 40 MG: 20 TABLET, FILM COATED ORAL at 08:04

## 2020-04-13 RX ADMIN — MYCOPHENOLATE MOFETIL 500 MG: 250 CAPSULE ORAL at 10:04

## 2020-04-13 RX ADMIN — PANTOPRAZOLE SODIUM 20 MG: 20 TABLET, DELAYED RELEASE ORAL at 05:04

## 2020-04-13 RX ADMIN — SULFAMETHOXAZOLE AND TRIMETHOPRIM 1 TABLET: 800; 160 TABLET ORAL at 10:04

## 2020-04-13 RX ADMIN — Medication 400 MG: at 12:04

## 2020-04-13 RX ADMIN — HYDROCODONE BITARTRATE AND ACETAMINOPHEN 1 TABLET: 5; 325 TABLET ORAL at 09:04

## 2020-04-13 RX ADMIN — HYDROXYCHLOROQUINE SULFATE 200 MG: 200 TABLET, FILM COATED ORAL at 08:04

## 2020-04-13 RX ADMIN — Medication 100 MG: at 12:04

## 2020-04-13 RX ADMIN — AMLODIPINE BESYLATE 10 MG: 10 TABLET ORAL at 10:04

## 2020-04-13 RX ADMIN — Medication 9 MG: at 08:04

## 2020-04-13 RX ADMIN — BACLOFEN 10 MG: 10 TABLET ORAL at 08:04

## 2020-04-13 NOTE — PLAN OF CARE
Goals addressed and unmet.  Cont with POC  Beryl Matos, LORIE  4/13/2020    Problem: Occupational Therapy Goal  Goal: Occupational Therapy Goal  Description  Goals to be met by:  4/8/2020    Patient will increase functional independence with ADLs by performing:    UE Dressing with Modified [place].=MET 4/3/2020  LE Dressing with Modified [place].=MET 4/3/2020  Grooming while standing at sink with Modified [place].=MET 4/3/2020  Toileting from toilet with Modified [place] for hygiene and clothing management.   Bathing from shower chair/bench with Modified [place].  Supine to sit with Modified [place] with HOB flat and no handrails.  Stand pivot transfers with Modified [place].  Toilet transfer to toilet with Modified [place].=MET 4/3/2020  Upper extremity exercise program 3 x 15 reps, with independence.  Patient will complete a dynamic standing activity for 8 min in order to perform household tasks.      Outcome: Ongoing, Progressing

## 2020-04-13 NOTE — PT/OT/SLP PROGRESS
"Physical Therapy  Treatment    Selma Lux   MRN: [id number]   Admitting Diagnosis: Respiratory failure    PT Received On: 04/13/20          Billable Minutes:  Gait Training 15, Therapeutic Activity 15 and Therapeutic Exercise 23    Treatment Type: Treatment  PT/PTA: PT     PTA Visit Number: (6th visit)       PT had a face-to-face encounter with regards to the patient's plan of care with the PTA who has been seeing this patient regularly.      General Precautions: Standard, fall  Orthopedic Precautions: N/A   Braces: N/A    Spiritual, Cultural Beliefs, Yarsanism Practices, Values that Affect Care: no    Subjective:  Communicated with pt prior to session.  "No one has ever told me that I am unsteady with the walker." "I'd rather use a cane than a walker. " Pt was educated that she presents as unsteady with use of the walker, therefore it is not recommended that she utilize a cane.  "I don't like to use the walker."    Pain/Comfort  Pain Rating 1: 5/10  Location - Side 1: Left  Location - Orientation 1: generalized  Location 1: shoulder  Pain Addressed 1: (moist heat)  Pain Rating Post-Intervention 1: 5/10    Objective:  Patient found supine in bed.        AM-PAC 6 CLICK MOBILITY  Total Score:18    Bed Mobility:  Supine>Sit: mod I    Transfers:  Sit<>Stand: supervision  Stand Pivot Transfer: supervision with RW    Gait:  Amb 120 ft, 40 ft with use of rolling walker, SBA, demonstrating instability around her ankles while wearing shoes without a backing. After using shoes with a backing, pt demonstrated less instability around her ankles.  Overall, pt demonstrates weakness in her L gluteals via adduction upon swing phase, thus narrow base of support.     Advanced Gait:  Stairs: 8 steps with B HR, CGA- slightly ataxic.    Therex:  Seated- hip flexion, long arc quads, ankle pumps with knee extension x 20 reps BLE    Balance:  SBA with RW needed.     Additional Treatment:  Completed 10 minutes on recumbent stepper at " level 3 to improve U/LE strength and endurance.  Moist heat applied to pt's L shoulder upon request due to shoulder injury years ago (7 mins).      Patient left up in chair with OT present.    Assessment:  Selma Lux is a 84 y.o. female with a medical diagnosis of Respiratory failure.  Ms. Lux presents with impaired standing balance via ambulation, requiring use of a rolling walker at this time. It is recommended that her daughter assist her lightly upon return home, as it relates to carrying any times while ambulating. She met five goals and is showing improvement but will benefit from continued PT services both inpatient and home health.    Rehab identified problem list/impairments: impaired endurance, impaired self care skills, impaired functional mobilty, gait instability, impaired balance, decreased upper extremity function    Rehab potential is fair.    Activity tolerance: Fair    Discharge recommendations: home health PT     Barriers to discharge: Inaccessible home environment, Decreased caregiver support    Equipment recommendations: wheelchair     GOALS:   Multidisciplinary Problems     Physical Therapy Goals        Problem: Physical Therapy Goal    Goal Priority Disciplines Outcome Goal Variances Interventions   Physical Therapy Goal     PT, PT/OT Ongoing, Progressing     Description:  Goals to be met by: 4/15/2020     Patient will increase functional independence with mobility by performin. Supine to sit with Modified [place] met (2020)  2. Sit to supine with Modified [place] met (2020)  3. Rolling to Left and Right with Modified [place]. met (2020)  4. Sit to stand transfer with Supervision met 2020  5. Bed to chair transfer with Supervision using Rolling Walker met (2020)  6. Gait  x 150 feet with Supervision using Rolling Walker.  Not met  7. Wheelchair propulsion x150 feet with Supervision using bilateral uppper extremities met 2020  8.  Ascend/descend 4 stair with bilateral Handrails Supervision met 4/9/2020  9. Ascend/Descend 4 inch curb step with Stand-by Assistance using Rolling Walker.   Not met  10. Stand for 3 minutes with Stand-by Assistance using Rolling Walker while performing a dynamic standing activity met 4/8/2020  11. Pt will perform a car transfer w/ RW and SBA met (4/13/2020)  12. Pt will walk over uneven surface (outside ramp) w/ RW and CGA.  Not met  13. Pt will  object from floor in a standing position w/ RW, use of a reacher, and SPV met 4/9/2020                          PLAN:    Patient to be seen 5 x/week  to address the above listed problems via therapeutic activities, gait training, therapeutic exercises  Plan of Care expires: 04/27/20  Plan of Care reviewed with: patient    Lou BOLAND Yoli, PT  04/13/2020

## 2020-04-13 NOTE — TELEPHONE ENCOUNTER
Called pt and spoke with her daughter and scheduled virtual visit for 4/21 @ 1100, pt daughter verbalized understanding

## 2020-04-13 NOTE — PT/OT/SLP PROGRESS
"Occupational Therapy  Treatment    Selma Lux   MRN: [id number]   Admitting Diagnosis: Respiratory failure    OT Date of Treatment: 04/13/20       Billable Minutes:  Therapeutic Activity 15 and Therapeutic Exercise 25    General Precautions: Standard, fall  Orthopedic Precautions: N/A  Braces: N/A    Spiritual, Cultural Beliefs, Amish Practices, Values that Affect Care: no    Subjective:  Communicated with RN prior to session.  "Im happy to see you"       Objective:       Occupational Performance:      Functional Mobility/Transfers:  · Patient completed Sit <> Stand Transfer with minimum assistance  with  rolling walker   · Patient completed Bed <> Chair Transfer using Step Transfer technique with contact guard assistance with rolling walker    Activities of Daily Living:  · Upper Body Dressing: stand by assistance set up  · Lower Body Dressing: minimum assistance    · Toileting: minimum assistance      AMPA 6 Click:  [facility] Total Score: 20    OT Exercises: 25 upward punches, 25 forward punches, extended arm alphabet exercises    Additional Treatment:  - ADL training for toileting   - UE therex/HEP  - bed mobility training       Patient left supine with all lines intact    ASSESSMENT:  Selma Lux is a 84 y.o. female with a medical diagnosis of Respiratory failure Pt with good participation and completion of self care tasks and UE therex.  Pt making gains and preparing for DC to home .    Rehab identified problem list/impairments: weakness, impaired endurance, impaired self care skills, impaired functional mobilty, gait instability, impaired balance, decreased upper extremity function, decreased ROM    Rehab potential is good    Activity tolerance: Good    Discharge recommendations: home with home health     Barriers to discharge: Decreased caregiver support, Inaccessible home environment     Equipment recommendations: bath bench     GOALS:   Multidisciplinary Problems     Occupational Therapy " Goals        Problem: Occupational Therapy Goal    Goal Priority Disciplines Outcome Interventions   Occupational Therapy Goal     OT, PT/OT Ongoing, Progressing    Description:  Goals to be met by:  4/8/2020    Patient will increase functional independence with ADLs by performing:    UE Dressing with Modified [place].=MET 4/3/2020  LE Dressing with Modified [place].=MET 4/3/2020  Grooming while standing at sink with Modified [place].=MET 4/3/2020  Toileting from toilet with Modified [place] for hygiene and clothing management.   Bathing from shower chair/bench with Modified [place].  Supine to sit with Modified [place] with HOB flat and no handrails.  Stand pivot transfers with Modified [place].  Toilet transfer to toilet with Modified [place].=MET 4/3/2020  Upper extremity exercise program 3 x 15 reps, with independence.  Patient will complete a dynamic standing activity for 8 min in order to perform household tasks.                       Plan:  Patient to be seen 3 x/week to address the above listed problems via self-care/home management, therapeutic activities, therapeutic exercises  Plan of Care expires: 04/28/20  Plan of Care reviewed with: patient    Beryl Matos, OT  04/13/2020

## 2020-04-13 NOTE — PT/OT/SLP PROGRESS
Speech Language Pathology  Treatment    Selma Lux   MRN: [id number]   Admitting Diagnosis: Respiratory failure    Diet recommendations: Solid Diet Level: Regular  Liquid Diet Level: Thin Standard aspiration precautions    SLP Treatment Date: 04/13/20  Speech Start Time: 1129      Speech Stop Time: 1209  Speech Total (min): 40 min       TREATMENT BILLABLE MINUTES:  Speech Therapy Individual 40    Has the patient been evaluated by SLP for swallowing? : No  Keep patient NPO?: No   General Precautions: Standard, fall  Current Respiratory Status: room air       Subjective:  Patient awake and alert during session    Objective:   Patient seen for ongoing cognitive therapy. She was sitting up in a wheelchair during session. Patient reported improved cognition, but acknowledged her deficits. Several times throughout session, patient demonstrated tangential thought processes that required redirection from SLP. Within the context of structured tasks, she demonstrated good ability across exercises. She was WFL for 3-part mental manipulation and answered 7/8 abstract thinking tasks with mod assist. She answered 3/3 problem solving tasks with mod assist. Patient's cognitive deficit became more apparent in spontaneous conversation and open-ended questions. SLP educated patient regarding role of ST in her POC and she verbalized understanding.     Assessment:  Selma Lux is a 84 y.o. female with a medical diagnosis of respiratory failure and presents with a cognitive-linguistic impairment.    Diet recommendations:        Liquid Diet Level: Thin    Standard aspiration precautions    Discharge recommendations: Discharge Facility/Level of Care Needs: home health speech therapy(ENT eval for dysphonia)     Goals:   Multidisciplinary Problems     SLP Goals        Problem: SLP Goal    Goal Priority Disciplines Outcome   SLP Goal     SLP Ongoing, Progressing   Description:  Updated Speech Therapy Short Term Goals  Goal  expected to be met by 4/21  1. Pt will recall functional information after a 5+ minute delay with 80% acc min cues.   2. Pt will follow 3-step commands with 70% accuracy given mod cues.  3. Pt will complete moderate level problem solving tasks with 70% accuracy given min-mod cues.   4. Pt will complete mental manipulation tasks with 75% accuracy given mod cues.  5. Pt will complete sustained attention tasks with 80% accuracy given mod cues.   6. Pt will participate in further assessment of reading, writing, and visual spatial abilities.     Speech Language Pathology Goals  Goals expected to be met by 4/6:  1. Pt will recall 3/3 words or facts after a 2 minute delay given min cues. -met  2. Pt will follow 3-step commands with 70% accuracy given mod cues. -ongoing  3. Pt will complete moderate level problem solving tasks with 70% accuracy given min-mod cues. -ongoing  4. Pt will complete mental manipulation tasks with 70% accuracy given mod cues. -met  5. Pt will complete sustained attention tasks with 70% accuracy given mod cues. -met  6. Pt will participate in further assessment of reading, writing, and visual spatial abilities. -ongoing                              Plan:   Patient to be seen Therapy Frequency: 3 x/week  Planned Interventions: Cognitive-Linguistic Therapy(OP Voice Evaluation)  Plan of Care expires: 04/30/20  Plan of Care reviewed with: patient  SLP Follow-up?: Yes  SLP - Next Visit Date: 04/15/20         Porfirio Khoury CCC-SLP  Speech-Language Pathology  Pager: [phone]   4/13/2020

## 2020-04-14 PROCEDURE — 97110 THERAPEUTIC EXERCISES: CPT | Mod: CQ

## 2020-04-14 PROCEDURE — 97535 SELF CARE MNGMENT TRAINING: CPT

## 2020-04-14 PROCEDURE — 97530 THERAPEUTIC ACTIVITIES: CPT | Mod: CQ

## 2020-04-14 PROCEDURE — 97110 THERAPEUTIC EXERCISES: CPT

## 2020-04-14 PROCEDURE — 97530 THERAPEUTIC ACTIVITIES: CPT

## 2020-04-14 PROCEDURE — 25000003 PHARM REV CODE 250: Performed by: INTERNAL MEDICINE

## 2020-04-14 PROCEDURE — 97116 GAIT TRAINING THERAPY: CPT | Mod: CQ

## 2020-04-14 PROCEDURE — 63600175 PHARM REV CODE 636 W HCPCS: Performed by: INTERNAL MEDICINE

## 2020-04-14 PROCEDURE — 11000004 HC SNF PRIVATE

## 2020-04-14 RX ORDER — HYDROCODONE BITARTRATE AND ACETAMINOPHEN 5; 325 MG/1; MG/1
1 TABLET ORAL EVERY 8 HOURS PRN
Qty: 30 TABLET | Refills: 0 | Status: SHIPPED | OUTPATIENT
Start: 2020-04-14 | End: 2020-10-19

## 2020-04-14 RX ORDER — PREDNISONE 10 MG/1
TABLET ORAL
Start: 2020-04-14 | End: 2020-07-10 | Stop reason: ALTCHOICE

## 2020-04-14 RX ORDER — FUROSEMIDE 40 MG/1
40 TABLET ORAL DAILY
Start: 2020-04-14 | End: 2020-06-09 | Stop reason: SDUPTHER

## 2020-04-14 RX ADMIN — HYDROXYCHLOROQUINE SULFATE 200 MG: 200 TABLET, FILM COATED ORAL at 09:04

## 2020-04-14 RX ADMIN — PREDNISONE 20 MG: 20 TABLET ORAL at 01:04

## 2020-04-14 RX ADMIN — MYCOPHENOLATE MOFETIL 500 MG: 250 CAPSULE ORAL at 09:04

## 2020-04-14 RX ADMIN — PANTOPRAZOLE SODIUM 20 MG: 20 TABLET, DELAYED RELEASE ORAL at 05:04

## 2020-04-14 RX ADMIN — Medication 100 MG: at 01:04

## 2020-04-14 RX ADMIN — ENOXAPARIN SODIUM 40 MG: 100 INJECTION SUBCUTANEOUS at 06:04

## 2020-04-14 RX ADMIN — FUROSEMIDE 40 MG: 40 TABLET ORAL at 09:04

## 2020-04-14 RX ADMIN — ATORVASTATIN CALCIUM 40 MG: 20 TABLET, FILM COATED ORAL at 09:04

## 2020-04-14 RX ADMIN — BACLOFEN 10 MG: 10 TABLET ORAL at 09:04

## 2020-04-14 RX ADMIN — MONTELUKAST 10 MG: 10 TABLET, FILM COATED ORAL at 09:04

## 2020-04-14 RX ADMIN — ASPIRIN 81 MG: 81 TABLET, COATED ORAL at 01:04

## 2020-04-14 RX ADMIN — STANDARDIZED SENNA CONCENTRATE AND DOCUSATE SODIUM 1 TABLET: 8.6; 5 TABLET ORAL at 01:04

## 2020-04-14 RX ADMIN — Medication 400 MG: at 01:04

## 2020-04-14 RX ADMIN — Medication 9 MG: at 09:04

## 2020-04-14 RX ADMIN — HYDROCODONE BITARTRATE AND ACETAMINOPHEN 1 TABLET: 5; 325 TABLET ORAL at 09:04

## 2020-04-14 RX ADMIN — AMLODIPINE BESYLATE 10 MG: 10 TABLET ORAL at 09:04

## 2020-04-14 NOTE — PLAN OF CARE
Problem: Occupational Therapy Goal  Goal: Occupational Therapy Goal  Description  Goals to be met by:  4/8/2020    Patient will increase functional independence with ADLs by performing:    UE Dressing with Modified [place].=MET 4/3/2020  LE Dressing with Modified [place].=MET 4/3/2020  Grooming while standing at sink with Modified [place].=MET 4/3/2020  Toileting from toilet with Modified [place] for hygiene and clothing management.   Bathing from shower chair/bench with Modified [place].  Supine to sit with Modified [place] with HOB flat and no handrails.  Stand pivot transfers with Modified [place].  Toilet transfer to toilet with Modified [place].=MET 4/3/2020  Upper extremity exercise program 3 x 15 reps, with independence.  Patient will complete a dynamic standing activity for 8 min in order to perform household tasks.      Outcome: Ongoing, Progressing

## 2020-04-14 NOTE — PT/OT/SLP PROGRESS
Occupational Therapy  Treatment    Selma Lux   MRN: [id number]   Admitting Diagnosis: Respiratory failure    OT Date of Treatment: 04/14/20       Billable Minutes:  Self Care/Home Management 10, Therapeutic Activity 15 and Therapeutic Exercise 15    General Precautions: Standard, fall  Orthopedic Precautions: N/A  Braces: N/A    Spiritual, Cultural Beliefs, Christian Practices, Values that Affect Care: no    Subjective:  Communicated with nurse prior to session.      Pain/Comfort  Pain Rating 1: 0/10  Pain Rating Post-Intervention 1: 0/10    Objective:  Patient found with: (no lines and seated in W/C)    Occupational Performance:    Bed Mobility:    · Pt seated in W/C at onset of therapy session.    Functional Mobility/Transfers:  · Patient completed Sit <> Stand Transfer with contact guard assistance  with  rolling walker   · Patient completed Bed <> Chair Transfer using Stand Pivot technique with contact guard assistance with rolling walker  · Patient completed Toilet Transfer Stand Pivot technique with contact guard assistance with  rolling walker    Activities of Daily Living:  · Grooming: modified independence seated sinkside  · Toileting: stand by assistance with Pt toileting from raised toilet seat and using grab bars to stand.    Moist heat applied to (L) shld  15 minutes to address reported stiffness followed by UBE exercise.    OT Exercises: UE Ergometer performed 15 minutes on UBE with Min resistance. UE exercises performed to increase functional endurance and strength in order increase independence when performing self care tasks, functional ambulation, W/C propulsion , functional standing activities as well as when performing functional tasks.    Additional Treatment:  Pt worked on functional standing activity consisting of standing with RW while reaching in all planes , crossing of midline and reaching to varying heights to facilitate (B) wt shifting and stability in standing .  Pt tolerated up  to 15 Min. 23 sec in standing with SBA and RW  to steady.    Patient left up in chair with call button in reach    [facility] 6 Click:  [facility] Total Score: 21    ASSESSMENT:  Selma Lux is a 84 y.o. female with a medical diagnosis of Respiratory failure .    Pt tolerated Tx without incident making progress with self care tasks and functional transfers and would continue to benefit from OT intervention to further her functional (I)ce and safety.    Rehab identified problem list/impairments: weakness, impaired endurance, impaired self care skills, impaired functional mobilty, gait instability, impaired balance, decreased upper extremity function, decreased ROM    Rehab potential is good    Activity tolerance: Good    Discharge recommendations: home with home health     Barriers to discharge: Decreased caregiver support, Inaccessible home environment     Equipment recommendations: bath bench     GOALS:   Multidisciplinary Problems     Occupational Therapy Goals        Problem: Occupational Therapy Goal    Goal Priority Disciplines Outcome Interventions   Occupational Therapy Goal     OT, PT/OT Ongoing, Progressing    Description:  Goals to be met by:  4/8/2020    Patient will increase functional independence with ADLs by performing:    UE Dressing with Modified [place].=MET 4/3/2020  LE Dressing with Modified [place].=MET 4/3/2020  Grooming while standing at sink with Modified [place].=MET 4/3/2020  Toileting from toilet with Modified [place] for hygiene and clothing management.   Bathing from shower chair/bench with Modified [place].  Supine to sit with Modified [place] with HOB flat and no handrails.  Stand pivot transfers with Modified [place].  Toilet transfer to toilet with Modified [place].=MET 4/3/2020  Upper extremity exercise program 3 x 15 reps, with independence.  Patient will complete a dynamic standing activity for 8 min in order to perform household tasks.                        Plan:  Patient to be seen 3 x/week to address the above listed problems via self-care/home management, therapeutic activities, therapeutic exercises  Plan of Care expires: 04/28/20  Plan of Care reviewed with: patient    HUSSAIN Danielle/ROSEMARIE  04/14/2020

## 2020-04-14 NOTE — PT/OT/SLP PROGRESS
"Physical Therapy  Treatment    Selma Lux   MRN: [id number]   Admitting Diagnosis: Respiratory failure    PT Received On: 04/14/20          Billable Minutes:  Gait Training 30, Therapeutic Activity 17 and Therapeutic Exercise 23    Treatment Type: Treatment  PT/PTA: PTA     PTA Visit Number: 1       General Precautions: Standard, fall  Orthopedic Precautions: N/A   Braces: N/A    Spiritual, Cultural Beliefs, [employer] Practices, Values that Affect Care: no    Subjective:  "I'm good" Pt agreebale to therapy    Pain/Comfort  Pain Rating 1: 0/10  Pain Addressed 1: Pre-medicate for activity  Pain Rating Post-Intervention 1: 0/10    Objective:  Patient found in wc in room       AM-PAC 6 CLICK MOBILITY  Total Score:18    Transfers:  Sit<>Stand: t/f wc SBA/S RW  Stand Pivot Transfer: wc<>recumbent stepper no AD SBA arm rest for support  Toilet transfer grab bars for support S    Gait:  Amb 2 x 95 RW with focus on increased step width, pt able to increase step width slightly vcs for safety SBA, slight instability noted  6 x 8' in parallel bars with focus on heel strike and increased step width with visual markers on floor SBA  1 x 95' RW pt with improved tech with increased heel strike and step width vcs for upright posture  Rest break between each trial, pt tolerated well    Wheelchair Mobility:  Patient propels w/c Mod I around gym     Therex:  Recumbent stepper 15 minutes L3  Standing hip abduction 2 x 10 vcs for tech, seated rest break between trials    Balance:  Standing static in parallel bars unilateral UE support L and R UE trials 2 x 10 seconds each for feet together eyes open, feet together eyes closed, one foot stand L and R trials eyes open and with eyes closed  No LOB noted throughout session, vcs for tech, seated rest breaks as needed    Additional Treatment:  LBD S, hand hygiene set up A, toileting S for all aspects  Discussed DME needs, HHPT, home environment safety, family training, set up for tm " 4/15    Patient educated on importance of increased time out of bed and SANAZ throughout the day    Patient left up in chair with OT present.    Assessment:  Selma Lux is a 84 y.o. female with a medical diagnosis of Respiratory failure.  Patient tolerated treatment well focusing on transfers, gait, therex and standing balance/tolerance. Patient will continue to improve with skilled physical therapy services in order to return to functional baseline.    Rehab identified problem list/impairments: impaired endurance, impaired self care skills, impaired functional mobilty, gait instability, impaired balance, decreased upper extremity function    Rehab potential is good.    Activity tolerance: Good    Discharge recommendations: home health PT     Barriers to discharge: Inaccessible home environment, Decreased caregiver support    Equipment recommendations: wheelchair     GOALS:   Multidisciplinary Problems     Physical Therapy Goals        Problem: Physical Therapy Goal    Goal Priority Disciplines Outcome Goal Variances Interventions   Physical Therapy Goal     PT, PT/OT Ongoing, Progressing     Description:  Goals to be met by: 4/15/2020     Patient will increase functional independence with mobility by performin. Supine to sit with Modified [place] met (2020)  2. Sit to supine with Modified [place] met (2020)  3. Rolling to Left and Right with Modified [place]. met (2020)  4. Sit to stand transfer with Supervision met 2020  5. Bed to chair transfer with Supervision using Rolling Walker met (2020)  6. Gait  x 150 feet with Supervision using Rolling Walker.  Not met  7. Wheelchair propulsion x150 feet with Supervision using bilateral uppper extremities met 2020  8. Ascend/descend 4 stair with bilateral Handrails Supervision met 2020  9. Ascend/Descend 4 inch curb step with Stand-by Assistance using Rolling Walker.   Not met  10. Stand for 3 minutes with  Stand-by Assistance using Rolling Walker while performing a dynamic standing activity met 4/8/2020  11. Pt will perform a car transfer w/ RW and SBA met (4/13/2020)  12. Pt will walk over uneven surface (outside ramp) w/ RW and CGA.  Not met  13. Pt will  object from floor in a standing position w/ RW, use of a reacher, and SPV met 4/9/2020                          PLAN:    Patient to be seen 5 x/week  to address the above listed problems via therapeutic activities, gait training, therapeutic exercises  Plan of Care expires: 04/27/20  Plan of Care reviewed with: patient    Lila Angeles, PTA  04/14/2020

## 2020-04-14 NOTE — PLAN OF CARE
[facility] - Skilled Nursing Care    HOME HEALTH ORDERS  FACE TO FACE ENCOUNTER    Patient Name: Selma Lux  YOB: 1935    PCP: Bhargav Hirsch MD   [facility] [address] [facility] [place] [address]  PCP Phone Number: [phone]  PCP Fax: [phone]    Encounter Date: 04/14/2020    Admit to Home Health    Diagnoses:  Active Hospital Problems    Diagnosis  POA    *Respiratory failure [J96.90]  Yes      Resolved Hospital Problems   No resolved problems to display.       Future Appointments   Date Time Provider Department Center   4/20/2020  1:00 PM Bhargav Hirsch MD [facility] IM [facility] PCW   4/21/2020 11:00 AM Lonnie Pina MD [facility] PULMSVC [facility]   4/27/2020  8:30 AM Farida Garcia MD [facility] CARDIO [facility]   5/1/2020  1:30 PM Dee Thomson MD [facility]   10/28/2020  9:00 AM [facility] USOP1 [facility] USOUNDO [facility]   11/9/2020  1:30 PM Rashad Monroy MD [facility] ENDO8 [facility]     Follow-up Information     Bhargav Hirsch MD.    Specialties:  Family Medicine, Sports Medicine  Why:  As needed  Contact information:  Kathleen GARCIA  [facility] [id number] [phone]                 I have seen and examined this patient face to face today. My clinical findings that support the need for the home health skilled services and home bound status are the following:  Weakness/numbness causing balance and gait disturbance due to Infection making it taxing to leave home.    Allergies:Review of patient's allergies indicates:  No Known Allergies    Diet: regular diet    Activities: activity as tolerated    Nursing:   SN to complete comprehensive assessment including routine vital signs. Instruct on disease process and s/s of complications to report to MD. Review/verify medication list sent home with the patient at time of discharge  and instruct patient/caregiver as needed. Frequency may be adjusted depending on start of care date.    Notify MD if SBP > 160  or < 90; DBP > 90 or < 50; HR > 120 or < 50; Temp > 101    CONSULTS:    Physical Therapy to evaluate and treat. Evaluate for home safety and equipment needs; Establish/upgrade home exercise program. Perform / instruct on therapeutic exercises, gait training, transfer training, and Range of Motion.  Occupational Therapy to evaluate and treat. Evaluate home environment for safety and equipment needs. Perform/Instruct on transfers, ADL training, ROM, and therapeutic exercises.  Aide to provide assistance with personal care, ADLs, and vital signs.    Medications: Review discharge medications with patient and family and provide education.      I certify that this patient is confined to her home and needs intermittent skilled nursing care, physical therapy and occupational therapy.

## 2020-04-15 VITALS
OXYGEN SATURATION: 99 % | HEIGHT: 64 IN | DIASTOLIC BLOOD PRESSURE: 78 MMHG | BODY MASS INDEX: 29.24 KG/M2 | WEIGHT: 171.31 LBS | TEMPERATURE: 98 F | SYSTOLIC BLOOD PRESSURE: 122 MMHG | HEART RATE: 99 BPM | RESPIRATION RATE: 20 BRPM

## 2020-04-15 LAB
ALBUMIN SERPL BCP-MCNC: 3.5 G/DL (ref 3.5–5.2)
ANION GAP SERPL CALC-SCNC: 9 MMOL/L (ref 8–16)
BUN SERPL-MCNC: 19 MG/DL (ref 8–23)
CALCIUM SERPL-MCNC: 8.8 MG/DL (ref 8.7–10.5)
CHLORIDE SERPL-SCNC: 104 MMOL/L (ref 95–110)
CO2 SERPL-SCNC: 26 MMOL/L (ref 23–29)
CREAT SERPL-MCNC: 1 MG/DL (ref 0.5–1.4)
EST. GFR  (AFRICAN AMERICAN): >60 ML/MIN/1.73 M^2
EST. GFR  (NON AFRICAN AMERICAN): 52.6 ML/MIN/1.73 M^2
GLUCOSE SERPL-MCNC: 83 MG/DL (ref 70–110)
MAGNESIUM SERPL-MCNC: 2 MG/DL (ref 1.6–2.6)
PHOSPHATE SERPL-MCNC: 3.2 MG/DL (ref 2.7–4.5)
POTASSIUM SERPL-SCNC: 3.8 MMOL/L (ref 3.5–5.1)
SODIUM SERPL-SCNC: 139 MMOL/L (ref 136–145)

## 2020-04-15 PROCEDURE — 83735 ASSAY OF MAGNESIUM: CPT

## 2020-04-15 PROCEDURE — 63600175 PHARM REV CODE 636 W HCPCS: Performed by: INTERNAL MEDICINE

## 2020-04-15 PROCEDURE — 80069 RENAL FUNCTION PANEL: CPT

## 2020-04-15 PROCEDURE — 36415 COLL VENOUS BLD VENIPUNCTURE: CPT

## 2020-04-15 PROCEDURE — 97530 THERAPEUTIC ACTIVITIES: CPT

## 2020-04-15 PROCEDURE — 97535 SELF CARE MNGMENT TRAINING: CPT | Mod: CO

## 2020-04-15 PROCEDURE — 25000003 PHARM REV CODE 250: Performed by: INTERNAL MEDICINE

## 2020-04-15 RX ORDER — OMEPRAZOLE 20 MG/1
20 CAPSULE, DELAYED RELEASE ORAL DAILY
Qty: 90 CAPSULE | Refills: 0 | Status: SHIPPED | OUTPATIENT
Start: 2020-04-15 | End: 2020-06-30

## 2020-04-15 RX ORDER — MONTELUKAST SODIUM 10 MG/1
10 TABLET ORAL NIGHTLY
Qty: 90 TABLET | Refills: 0 | Status: SHIPPED | OUTPATIENT
Start: 2020-04-15 | End: 2020-07-14 | Stop reason: SDUPTHER

## 2020-04-15 RX ORDER — SULFAMETHOXAZOLE AND TRIMETHOPRIM 800; 160 MG/1; MG/1
1 TABLET ORAL
Start: 2020-04-15 | End: 2020-04-20

## 2020-04-15 RX ORDER — ERGOCALCIFEROL 1.25 MG/1
50000 CAPSULE ORAL
Qty: 8 CAPSULE
Start: 2020-04-15 | End: 2020-04-20

## 2020-04-15 RX ADMIN — Medication 100 MG: at 11:04

## 2020-04-15 RX ADMIN — BACLOFEN 10 MG: 10 TABLET ORAL at 09:04

## 2020-04-15 RX ADMIN — HYDROXYCHLOROQUINE SULFATE 200 MG: 200 TABLET, FILM COATED ORAL at 09:04

## 2020-04-15 RX ADMIN — FUROSEMIDE 40 MG: 40 TABLET ORAL at 09:04

## 2020-04-15 RX ADMIN — AMLODIPINE BESYLATE 10 MG: 10 TABLET ORAL at 09:04

## 2020-04-15 RX ADMIN — ASPIRIN 81 MG: 81 TABLET, COATED ORAL at 11:04

## 2020-04-15 RX ADMIN — MYCOPHENOLATE MOFETIL 500 MG: 250 CAPSULE ORAL at 09:04

## 2020-04-15 RX ADMIN — PANTOPRAZOLE SODIUM 20 MG: 20 TABLET, DELAYED RELEASE ORAL at 05:04

## 2020-04-15 RX ADMIN — HYDROCODONE BITARTRATE AND ACETAMINOPHEN 1 TABLET: 5; 325 TABLET ORAL at 11:04

## 2020-04-15 RX ADMIN — PREDNISONE 20 MG: 20 TABLET ORAL at 11:04

## 2020-04-15 RX ADMIN — SULFAMETHOXAZOLE AND TRIMETHOPRIM 1 TABLET: 800; 160 TABLET ORAL at 09:04

## 2020-04-15 RX ADMIN — Medication 400 MG: at 11:04

## 2020-04-15 NOTE — PT/OT/SLP PROGRESS
"Occupational Therapy  Treatment    Selma Lux   MRN: [id number]   Admitting Diagnosis: Respiratory failure    OT Date of Treatment: 04/15/20       Billable Minutes:25  Self Care/Home Management 25    General Precautions: Standard, fall  Orthopedic Precautions: N/A  Braces: N/A    Spiritual, Cultural Beliefs, Alevism Practices, Values that Affect Care: no    Subjective:  Communicated with nsg prior to session.  "I do not know if my daughter is here yet for the family training"    Pain/Comfort  Pain Rating 1: 0/10  Pain Rating Post-Intervention 1: 0/10    Objective:   Pt. Found seated in w/c on arrival  Daughter retrieved from downstairs.    Occupational Performance:    Bed Mobility:    · Patient completed Supine to Sit with supervision  · Patient completed Sit to Supine with supervision     Functional Mobility/Transfers:  · Patient completed Sit <> Stand Transfer with supervision  with  rolling walker   · Patient completed Toilet Transfer Step Transfer technique with stand by assistance with  rolling walker  · Pt. With fxl mobility from w/c<>toilet with RW and supervision.    Activities of Daily Living:  · Toileting: supervision for hygiene and clothing management    [facility] 6 Click:  [facility] Total Score: 21      Additional Treatment:  Pt. With family training held on this day(4/15/2020) with daughter reviewed t/f's, selfcare skills and DME and level of (Supervision) for home upon d/c  HEP given to daughter during therapy session.     Patient left up in chair with all lines intact, call button in reach, PT notified and daughter present    ASSESSMENT:  Selma Lux is a 84 y.o. female with a medical diagnosis of Respiratory failure Pt. participated well with session on this day. Pt is progressing well with session on this day still continues to requires cues with aspects of safety. Pt. participated well with family training on this day. Daughter present for session. Pt. participated in bed transfer and " toilet transfer.    Rehab identified problem list/impairments: weakness, impaired endurance, impaired self care skills, impaired functional mobilty, gait instability, impaired balance, decreased upper extremity function, decreased ROM    Rehab potential is fair    Activity tolerance: Fair    Discharge recommendations: home with home health     Barriers to discharge: Decreased caregiver support, Inaccessible home environment     Equipment recommendations: bath bench     GOALS:   Multidisciplinary Problems     Occupational Therapy Goals        Problem: Occupational Therapy Goal    Goal Priority Disciplines Outcome Interventions   Occupational Therapy Goal     OT, PT/OT Ongoing, Progressing    Description:  Goals to be met by:  4/8/2020 Extended to 4/15/20    Patient will increase functional independence with ADLs by performing:    UE Dressing with Modified [place].=MET 4/3/2020  LE Dressing with Modified [place].=MET 4/3/2020  Grooming while standing at sink with Modified [place].=MET 4/3/2020  Toileting from toilet with Modified [place] for hygiene and clothing management.   Bathing from shower chair/bench with Modified [place].  Supine to sit with Modified [place] with HOB flat and no handrails.  Stand pivot transfers with Modified [place].  Toilet transfer to toilet with Modified [place].=MET 4/3/2020  Upper extremity exercise program 3 x 15 reps, with independence.  Patient will complete a dynamic standing activity for 8 min in order to perform household tasks.                    Plan:  Patient to be seen 3 x/week to address the above listed problems via self-care/home management, therapeutic activities, therapeutic exercises  Plan of Care expires: 04/28/20  Plan of Care reviewed with: patient    Britany Wilder, GENE MELO and ADE have discussed the above patients goals and status in collaboration with Plan of Care.  04/15/2020

## 2020-04-15 NOTE — HOSPITAL COURSE
Patient progressed well with PT and OT. Patient had no significant events during their stay at SNF. Home health was set up. DME was ordered if needed. Follow up appointment to be made by patient within one week. All prescriptions and discharge instructions were ordered to be given to the patient prior to discharge.     PEx    PEx completed with help of bedside nurse     Constitutional: Patient appears well-developed.  No distress noted  HENT:   Head: Normocephalic and atraumatic.   Eyes: Pupils are equal, round  Neck: Normal range of motion. Neck supple.   Cardiovascular: Normal rate, regular rhythm and normal heart sounds.    Pulmonary/Chest: Effort normal and breath sounds are clear  Abdominal: Soft. Bowel sounds are normal.   Musculoskeletal: Normal range of motion.   Neurological: Alert and oriented to person, place, and time.   Psychiatric: Normal mood and affect. Behavior is normal.   Skin: Skin is warm and dry.

## 2020-04-15 NOTE — PLAN OF CARE
Problem: Adult Inpatient Plan of Care  Goal: Plan of Care Review  Outcome: Ongoing, Progressing     Problem: Fall Injury Risk  Goal: Absence of Fall and Fall-Related Injury  Outcome: Ongoing, Progressing     Problem: Skin Injury Risk Increased  Goal: Skin Health and Integrity  Outcome: Ongoing, Progressing

## 2020-04-15 NOTE — NURSING
Daughter and pt to gym, family training with therapy complete. PCT transported pt via w/c to front entrance for discharge to home with daughter

## 2020-04-15 NOTE — NURSING
Instructed pt on home discharge orders, medications and importance of follow up appt and safety. Handed pt copy of discharge paperwork.. Pt verbalized understanding

## 2020-04-15 NOTE — PROGRESS NOTES
Patient discharge is complete, she is scheduled with [facility] home health and will receive a w/c from SNF discharge depot. Family scheduled to  pt at 11:00am. Pt will discharge with assistance of family and home health. SW will continue to monitor until the pt is discharged.

## 2020-04-15 NOTE — DISCHARGE SUMMARY
[facility] - Skilled Nursing [facility] Medicine  Discharge Summary      Patient Name: Selma Lux  MRN: [id number]  Admission Date: 3/27/2020  Hospital Length of Stay: 19 days  Discharge Date and Time:  04/15/2020 11:03 AM  Attending Physician: Denita Acosta MD   Discharging Provider: Amber Bay NP  Primary Care Provider: Bhargav Hirsch MD    VIRTUAL TELENOTE     Patient Location: [facility]- 3rd floor SNF   Provider Location: Providers Home  Start time: 1030  End time:  1050  Total time spent with patient:  20 Minutes      Evaluation by video and audio      HPI:   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  Admission to SNF for secondary weakness and debility.     Patient originally presented to [facility] ED on 02/27 with generalized weakness and intermittent mechanical falls for the past 2 weeks piror to admisison. 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      Obtained urine and started on BSabx. Unfortunately for blood cultures not obtained, ordered, NGTD. Urine with E Coli. Hypoglycemia noted, suspecting from infection and poor po intake. Since admission patient has gradually improved with supportive care. Changed IV abx to PO. On 3/2 morning patient was found to be hypoxic Sp02 88% and lethargic.  No piror hx of FREEDOM. Respiratory panel negative. ABG with respiratory alkalosis. CTA with no PE but noting worsening consolidations, will consult pulmonary. Restarted BSabx. Pulmonary offered bronchoscopy due to hx of , however patient/daughter declined  at this time. Plan to continue BSabx for now, and now increased home steroids as per pulmonary. De-escalated abx to cefepime for total 7 days as per pulmonary. Plan to continue high dose steroids for a few weeks before beginning to taper off. Started on bactrim MWF while she remains of >20mg prednisone per day. PT OT continue to work with patient. Patient finished her 7 day course of cefepime. Resumed her cellcept.  She will need to fu with PCP and pulmonary on discharge. Consider OP sleep study, will defer to pulmonary.      Patient will be treated at [facility] with PT and OT to improve functional status and ability to perform ADLs.     * No surgery found *      Hospital Course:   Patient progressed well with PT and OT. Patient had no significant events during their stay at . Home health was set up. DME was ordered if needed. Follow up appointment to be made by patient within one week. All prescriptions and discharge instructions were ordered to be given to the patient prior to discharge.     PEx    PEx completed with help of bedside nurse     Constitutional: Patient appears well-developed.  No distress noted  HENT:   Head: Normocephalic and atraumatic.   Eyes: Pupils are equal, round  Neck: Normal range of motion. Neck supple.   Cardiovascular: Normal rate, regular rhythm and normal heart sounds.    Pulmonary/Chest: Effort normal and breath sounds are clear  Abdominal: Soft. Bowel sounds are normal.   Musculoskeletal: Normal range of motion.   Neurological: Alert and oriented to person, place, and time.   Psychiatric: Normal mood and affect. Behavior is normal.   Skin: Skin is warm and dry.         Consults:   Consults (From admission, onward)        Status Ordering Provider     Inpatient consult to Registered Dietitian/Nutritionist  Once     Provider:  (Not yet assigned)    BLOSSOM Infante          No new Assessment & Plan notes have been filed under this hospital service since the last note was  "generated.  Service: Hospital Medicine    Final Active Diagnoses:    Diagnosis Date Noted POA    PRINCIPAL PROBLEM:  Respiratory failure [J96.90] 03/27/2020 Yes      Problems Resolved During this Admission:       Discharged Condition: good    Disposition: Home-Health Care [facility]    Follow Up:  Follow-up Information     Bhargav Hirsch MD.    Specialties:  Family Medicine, Sports Medicine  Why:  As needed  Contact information:  Kathleen GARCIA  [facility] [address]  [phone]                 Patient Instructions:      WHEELCHAIR FOR HOME USE     Order Specific Question Answer Comments   Hours in W/C per day: 8    Type of Wheelchair: Lightweight    Patient unable to propel in Standard wheelchair? Yes    Size(Width): 18"(STD adult)    Leg Support: STD footrests    Leg Support: Elevating leg rests    Leg Support: Swing Away    Arm Height: Full length    Arm Height: Swing away    Lap Belt: Velcro    Accessories: Front brakes    Accessories: Anti-tippers    Cushion: Basic    Justification for cushion: Prevent pressure ulcers    Height: 5' 4" (1.626 m)    Weight: 78.1 kg (172 lb 2.9 oz)    Does patient have medical equipment at home? bedside commode    Does patient have medical equipment at home? wheelchair    Does patient have medical equipment at home? walker, rolling    Does patient have medical equipment at home? cane, straight    Length of need (1-99 months): 99    Please check all that apply: Caregiver is capable and willing to operate wheelchair safely.    Please check all that apply: Patient mobility limitations cannot be sufficiently resolved by the use of other ambulatory therapies.    Please check all that apply: The patient requires the use of a w/c for activities of daily living within the Home.    Vendor: [facility] to pull from SNF depot   Expected Date of Delivery: 4/15/2020      No driving until:   Order Comments: Cleared by PCP     Notify your health care provider if you experience any of " the following:  temperature >100.4     Notify your health care provider if you experience any of the following:  persistent nausea and vomiting or diarrhea     Notify your health care provider if you experience any of the following:  severe uncontrolled pain     Notify your health care provider if you experience any of the following:  redness, tenderness, or signs of infection (pain, swelling, redness, odor or green/yellow discharge around incision site)     Notify your health care provider if you experience any of the following:  difficulty breathing or increased cough     Notify your health care provider if you experience any of the following:  persistent dizziness, light-headedness, or visual disturbances     Notify your health care provider if you experience any of the following:  increased confusion or weakness     Activity as tolerated       Significant Diagnostic Studies: Labs:   BMP:   Recent Labs   Lab 04/15/20  0418   GLU 83      K 3.8      CO2 26   BUN 19   CREATININE 1.0   CALCIUM 8.8   MG 2.0    and CBC No results for input(s): WBC, HGB, HCT, PLT in the last 48 hours.    Pending Diagnostic Studies:     None         Medications:  Reconciled Home Medications:      Medication List      CHANGE how you take these medications    furosemide 40 MG tablet  Commonly known as:  LASIX  Take 1 tablet (40 mg total) by mouth once daily. TAKE 1 TABLET(40 MG) BY MOUTH DAILY  What changed:  additional instructions     HYDROcodone-acetaminophen 5-325 mg per tablet  Commonly known as:  NORCO  Take 1 tablet by mouth every 8 (eight) hours as needed.  What changed:  reasons to take this     predniSONE 10 MG tablet  Commonly known as:  DELTASONE  20 mg daily indefinitely until seen by lung doctor  What changed:  additional instructions        CONTINUE taking these medications    acetaminophen 325 MG tablet  Commonly known as:  TYLENOL  Take 2 tablets (650 mg total) by mouth every 6 (six) hours as needed for Pain or  Temperature greater than (101).     amLODIPine 10 MG tablet  Commonly known as:  NORVASC  Take 10 mg by mouth once daily.     aspirin 81 MG EC tablet  Commonly known as:  ECOTRIN  Take 81 mg by mouth once daily.     atorvastatin 40 MG tablet  Commonly known as:  LIPITOR  Take 1 tablet (40 mg total) by mouth once daily.     baclofen 10 MG tablet  Commonly known as:  LIORESAL  Take 10 mg by mouth 3 (three) times daily as needed.     ergocalciferol 50,000 unit Cap  Commonly known as:  ERGOCALCIFEROL  Take 1 capsule (50,000 Units total) by mouth every 7 days. For total 8 weeks     hydroxychloroquine 200 mg tablet  Commonly known as:  PLAQUENIL  Take 1 tablet (200 mg total) by mouth 2 (two) times daily.     magnesium oxide 400 mg (241.3 mg magnesium) tablet  Commonly known as:  MAG-OX  Take 400 mg by mouth once daily.     melatonin 3 mg tablet  Commonly known as:  MELATIN  Take 2 tablets (6 mg total) by mouth nightly as needed for Insomnia.     montelukast 10 mg tablet  Commonly known as:  SINGULAIR  Take 1 tablet (10 mg total) by mouth every evening.     mycophenolate 500 mg Tab  Commonly known as:  CELLCEPT  Take 1 tablet (500 mg total) by mouth 2 (two) times daily.     omeprazole 20 MG capsule  Commonly known as:  PRILOSEC  Take 1 capsule (20 mg total) by mouth once daily.     polyethylene glycol 17 gram Pwpk  Commonly known as:  GLYCOLAX  Take 17 g by mouth daily as needed.     sulfamethoxazole-trimethoprim 800-160mg 800-160 mg Tab  Commonly known as:  BACTRIM DS  Take 1 tablet by mouth every Mon, Wed, Fri.     thiamine 100 MG tablet  Take 100 mg by mouth once daily.        STOP taking these medications    albuterol-ipratropium 2.5 mg-0.5 mg/3 mL nebulizer solution  Commonly known as:  DUO-NEB     dicyclomine 10 MG capsule  Commonly known as:  BENTYL     potassium chloride SA 10 MEQ tablet  Commonly known as:  K-DUR,KLOR-CON            Indwelling Lines/Drains at time of discharge:   Lines/Drains/Airways     None                  Time spent on the discharge of patient: 33 minutes  Patient was seen and examined via tele Health on the date of discharge and determined to be suitable for discharge.         Amber Bay NP  Department of Hospital Medicine  [facility] - Skilled Nursing Care

## 2020-04-15 NOTE — PT/OT/SLP PROGRESS
"Physical Therapy  Treatment/discharge summary/family training    Selma Lux   MRN: [id number]   Admitting Diagnosis: Respiratory failure    PT Received On: 04/15/20          Billable Minutes:  Therapeutic Activity 15    Treatment Type: Treatment  PT/PTA: PT     PTA Visit Number: 0       General Precautions: Standard, fall  Orthopedic Precautions: N/A   Braces: N/A    Spiritual, Cultural Beliefs, Hinduism Practices, Values that Affect Care: no    Subjective:  Communicated with pt prior to session.    Pain/Comfort  Pain Rating 1: 0/10    Objective:  Patient found seated in WC with daughterSusu, present for family training.       AM-PAC 6 CLICK MOBILITY  Total Score:18    Transfers:  Sit<>Stand: Mod I  Stand Pivot Transfer: Mod I with RW    Gait:  Amb 150 ft with use of RW, narrow LYNDSEY (educated to widen LYNDSEY, avoiding cross-over gait with RLE), SBA.     Advanced Gait:  Stairs: 4 steps with B HR, SBA- educated daughter how to guard pt one step below pt and sequencing (Strongest leg leads while ascending, "bad" leg leads while descending).  Curb Step: 4 inch, RW, SBA    Wheelchair Mobility:  Pt reports not bringing a WC home with them.    Therex:  HEP administered to pt and circled the exercises that will provide for stronger glutes (eliminating cross over gait or narrow base of support while ambulating)      Additional Treatment:  Educated daughter that pt needs to use the RW at this time for stability (may be able to progress to cane with HHPT).   Pt needs to wear closed toe shoes with a back (no slippers) for stability.    Patient left up in chair with call button in reach.    Assessment:  Selma Lux is a 84 y.o. female with a medical diagnosis of Respiratory failure.  Pt met 10 goals below. Will benefit from therapy services, focusing on her balance and glute strength upon return home.    Rehab identified problem list/impairments: impaired endurance, impaired self care skills, impaired functional " mobilty, gait instability, impaired balance, decreased upper extremity function    Rehab potential is good.    Activity tolerance: Good    Discharge recommendations: home health PT     Barriers to discharge: Inaccessible home environment, Decreased caregiver support    Equipment recommendations: wheelchair     GOALS:   Multidisciplinary Problems     Physical Therapy Goals        Problem: Physical Therapy Goal    Goal Priority Disciplines Outcome Goal Variances Interventions   Physical Therapy Goal     PT, PT/OT Ongoing, Progressing     Description:  Goals to be met by: 4/15/2020     Patient will increase functional independence with mobility by performin. Supine to sit with Modified [place] met (2020)  2. Sit to supine with Modified [place] met (2020)  3. Rolling to Left and Right with Modified [place]. met (2020)  4. Sit to stand transfer with Supervision met 2020  5. Bed to chair transfer with Supervision using Rolling Walker met (2020)  6. Gait  x 150 feet with Supervision using Rolling Walker.  Not met  7. Wheelchair propulsion x150 feet with Supervision using bilateral uppper extremities met 2020  8. Ascend/descend 4 stair with bilateral Handrails Supervision met 2020  9. Ascend/Descend 4 inch curb step with Stand-by Assistance using Rolling Walker.   Met (4/15/2020)  10. Stand for 3 minutes with Stand-by Assistance using Rolling Walker while performing a dynamic standing activity met 2020  11. Pt will perform a car transfer w/ RW and SBA met (2020)  12. Pt will walk over uneven surface (outside ramp) w/ RW and CGA.  Not met  13. Pt will  object from floor in a standing position w/ RW, use of a reacher, and SPV met 2020                           PLAN:    DC home with HHPT and assistance from daughter.  Lou eKnt, PT  04/15/2020

## 2020-04-15 NOTE — HPI
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  Admission to SNF for secondary weakness and debility.     Patient originally presented to [facility] ED on 02/27 with generalized weakness and intermittent mechanical falls for the past 2 weeks piror to admisison. 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      Obtained urine and started on BSabx. Unfortunately for blood cultures not obtained, ordered, NGTD. Urine with E Coli. Hypoglycemia noted, suspecting from infection and poor po intake. Since admission patient has gradually improved with supportive care. Changed IV abx to PO. On 3/2 morning patient was found to be hypoxic Sp02 88% and lethargic.  No piror hx of FREEDOM. Respiratory panel negative. ABG with respiratory alkalosis. CTA with no PE but noting worsening consolidations, will consult pulmonary. Restarted BSabx. Pulmonary offered bronchoscopy due to hx of , however patient/daughter declined at this time. Plan to continue BSabx for now, and now increased home steroids as per pulmonary. De-escalated abx to cefepime for total 7 days as per pulmonary. Plan to continue high dose steroids for a few weeks before beginning to taper off. Started on bactrim MWF while she remains of >20mg prednisone per day. PT OT continue to work with patient. Patient finished her 7 day course of cefepime. Resumed her cellcept.  She will need to fu with PCP and pulmonary on discharge. Consider OP sleep study, will defer to pulmonary.      Patient will be treated at [facility] with PT and OT to improve  functional status and ability to perform ADLs.

## 2020-04-15 NOTE — PROGRESS NOTES
SW spoke with pt's daughter Susu discuss discharge plans with her.  Pt is scheduled for dc 4/14/20. Pt's daughter asked that we push pt discharge back a day until the 4/15/20 because she is able to dedicate this whole day to getting her mother settled in the home. SW was able to get the extra day for pt discharge. Daughter stated she will be here at 11am to transport her mother home. SW will continue to monitor until discharge.

## 2020-04-15 NOTE — PLAN OF CARE
Repositions independently, no new skin breakdowns noted. Afebrile, monitored for pain and safety. Safety maintained. Pain meds effective.

## 2020-04-16 PROCEDURE — G0180 MD CERTIFICATION HHA PATIENT: HCPCS | Mod: ,,, | Performed by: INTERNAL MEDICINE

## 2020-04-16 PROCEDURE — G0180 PR HOME HEALTH MD CERTIFICATION: ICD-10-PCS | Mod: ,,, | Performed by: INTERNAL MEDICINE

## 2020-04-20 ENCOUNTER — PATIENT OUTREACH (OUTPATIENT)
Dept: ADMINISTRATIVE | Facility: OTHER | Age: 85
End: 2020-04-20

## 2020-04-20 ENCOUNTER — OFFICE VISIT (OUTPATIENT)
Dept: INTERNAL MEDICINE | Facility: CLINIC | Age: 85
End: 2020-04-20
Attending: FAMILY MEDICINE
Payer: MEDICARE

## 2020-04-20 ENCOUNTER — OUTPATIENT CASE MANAGEMENT (OUTPATIENT)
Dept: ADMINISTRATIVE | Facility: OTHER | Age: 85
End: 2020-04-20

## 2020-04-20 DIAGNOSIS — N18.30 CHRONIC RENAL FAILURE SYNDROME, STAGE 3 (MODERATE): ICD-10-CM

## 2020-04-20 DIAGNOSIS — N39.0 URINARY TRACT INFECTION WITHOUT HEMATURIA, SITE UNSPECIFIED: ICD-10-CM

## 2020-04-20 DIAGNOSIS — J84.116 CRYPTOGENIC ORGANIZING PNEUMONIA: ICD-10-CM

## 2020-04-20 DIAGNOSIS — J96.01 ACUTE RESPIRATORY FAILURE WITH HYPOXIA: Primary | ICD-10-CM

## 2020-04-20 DIAGNOSIS — R53.1 GENERALIZED WEAKNESS: ICD-10-CM

## 2020-04-20 DIAGNOSIS — M05.79 SEROPOSITIVE RHEUMATOID ARTHRITIS OF MULTIPLE SITES: ICD-10-CM

## 2020-04-20 DIAGNOSIS — R53.81 DEBILITATED: ICD-10-CM

## 2020-04-20 DIAGNOSIS — E55.9 VITAMIN D DEFICIENCY: ICD-10-CM

## 2020-04-20 PROCEDURE — 99211 OFF/OP EST MAY X REQ PHY/QHP: CPT

## 2020-04-20 PROCEDURE — G0463 HOSPITAL OUTPT CLINIC VISIT: HCPCS

## 2020-04-20 PROCEDURE — 99214 PR OFFICE/OUTPT VISIT, EST, LEVL IV, 30-39 MIN: ICD-10-PCS | Mod: 95,,, | Performed by: FAMILY MEDICINE

## 2020-04-20 PROCEDURE — 99214 OFFICE O/P EST MOD 30 MIN: CPT | Mod: 95,,, | Performed by: FAMILY MEDICINE

## 2020-04-20 RX ORDER — SULFAMETHOXAZOLE AND TRIMETHOPRIM 800; 160 MG/1; MG/1
1 TABLET ORAL
Qty: 12 TABLET | Refills: 1 | Status: SHIPPED | OUTPATIENT
Start: 2020-04-20 | End: 2020-07-10 | Stop reason: ALTCHOICE

## 2020-04-20 RX ORDER — ERGOCALCIFEROL 1.25 MG/1
50000 CAPSULE ORAL
Qty: 8 CAPSULE | Refills: 0 | Status: SHIPPED | OUTPATIENT
Start: 2020-04-20 | End: 2020-06-11

## 2020-04-20 RX ORDER — AMLODIPINE BESYLATE 10 MG/1
10 TABLET ORAL DAILY
Qty: 90 TABLET | Refills: 1 | Status: SHIPPED | OUTPATIENT
Start: 2020-04-20 | End: 2020-09-18 | Stop reason: SDUPTHER

## 2020-04-20 NOTE — PROGRESS NOTES
Per chart review. LMSW received a referral on the above patent. Per PCP Family request for SNF to SNF transfer to [facility]. Patient admitted to [facility] 03/27/2020. Same day referral was placed. LMSW will close case as needs met.

## 2020-04-20 NOTE — PROGRESS NOTES
Subjective:       Patient ID: Selma Lux is a 84 y.o. female.    Chief Complaint: No chief complaint on file.    The patient location is:  Home  The chief complaint leading to consultation is:  Hospitalization and skilled nursing follow-up  Visit type: audiovisual  Total time spent with patient:  20 min  Each patient to whom he or she provides medical services by telemedicine is:  (1) informed of the relationship between the physician and patient and the respective role of any other health care provider with respect to management of the patient; and (2) notified that he or she may decline to receive medical services by telemedicine and may withdraw from such care at any time.    Notes:  Hospitalization follow-up.  She was in the skilled nursing facility.  Primary diagnosis was urinary infection.  Other diagnoses applicable.  See discharge summary below.  She did not get some of her discharge medications.  I noted that the Septra was for 3 times weekly as long as her prednisone dosing was above 20 mg.  She will be due for followups in the pulmonary and rheumatology clinics.  Denies significant dyspnea at this time.  Difficult to tell her neurologic status over the video but she states she is doing well and has home health.  Expectation would be 4-6 weeks of continuation.    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  Admission to SNF for secondary weakness and  debility.     Patient originally presented to [facility] on 02/27 with generalized weakness and intermittent mechanical falls for the past 2 weeks piror to admisison. 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      Obtained urine and started on BSabx. Unfortunately for blood cultures not obtained, ordered, NGTD. Urine with E Coli. Hypoglycemia noted, suspecting from infection and poor po intake. Since admission patient has gradually improved with supportive care. Changed IV abx to PO. On 3/2 morning patient was found to be hypoxic Sp02 88% and lethargic.  No piror hx of FREEDOM. Respiratory panel negative. ABG with respiratory alkalosis. CTA with no PE but noting worsening consolidations, will consult pulmonary. Restarted BSabx. Pulmonary offered bronchoscopy due to hx of , however patient/daughter declined at this time. Plan to continue BSabx for now, and now increased home steroids as per pulmonary. De-escalated abx to cefepime for total 7 days as per pulmonary. Plan to continue high dose steroids for a few weeks before beginning to taper off. Started on bactrim MWF while she remains of >20mg prednisone per day. PT OT continue to work with patient. Patient finished her 7 day course of cefepime. Resumed her cellcept.  She will need to fu with PCP and pulmonary on discharge. Consider OP sleep study, will defer to pulmonary.      Patient will be treated at [facility] with PT and OT to improve functional status and ability to perform ADLs.      * No surgery found *       Hospital Course:   Patient progressed well with PT and OT. Patient had no significant events during their stay at SNF. Home health was set up. DME was ordered if needed. Follow up appointment to be made by patient within one week. All prescriptions and discharge instructions were ordered to be given to the  "patient prior to discharge.      PEx     PEx completed with help of bedside nurse     Constitutional: Patient appears well-developed.  No distress noted  HENT:   Head: Normocephalic and atraumatic.   Eyes: Pupils are equal, round  Neck: Normal range of motion. Neck supple.   Cardiovascular: Normal rate, regular rhythm and normal heart sounds.    Pulmonary/Chest: Effort normal and breath sounds are clear  Abdominal: Soft. Bowel sounds are normal.   Musculoskeletal: Normal range of motion.   Neurological: Alert and oriented to person, place, and time.   Psychiatric: Normal mood and affect. Behavior is normal.   Skin: Skin is warm and dry.          Consults:     Consults (From admission, onward)        Status Ordering Provider        Inpatient consult to Registered Dietitian/Nutritionist  Once    Provider:  (Not yet assigned)   BLOSSOM Infante           No new Assessment & Plan notes have been filed under this hospital service since the last note was generated.  Service: Hospital Medicine       Final Active Diagnoses:    Diagnosis Date Noted POA   PRINCIPAL PROBLEM:  Respiratory failure (J96.90) 03/27/2020 Yes     Problems Resolved During this Admission:        Discharged Condition: good     Disposition: Home-Health Care [facility]     Follow Up:    Follow-up Information       Bhargav Hirsch MD.   Specialties:  Family Medicine, Sports Medicine  Why:  As needed  Contact information:  [address]  [place] [id number] [phone]                     Patient Instructions:        WHEELCHAIR FOR HOME USE       Order Specific Question Answer Comments  Hours in W/C per day: 8    Type of Wheelchair: Lightweight    Patient unable to propel in Standard wheelchair? Yes    Size(Width): 18"(STD adult)    Leg Support: STD footrests    Leg Support: Elevating leg rests    Leg Support: Swing Away    Arm Height: Full length    Arm Height: Swing away    Lap Belt: Velcro    Accessories: Front " "brakes    Accessories: Anti-tippers    Cushion: Basic    Justification for cushion: Prevent pressure ulcers    Height: 5' 4" (1.626 m)    Weight: 78.1 kg (172 lb 2.9 oz)    Does patient have medical equipment at home? bedside commode    Does patient have medical equipment at home? wheelchair    Does patient have medical equipment at home? walker, rolling    Does patient have medical equipment at home? cane, straight    Length of need (1-99 months): 99    Please check all that apply: Caregiver is capable and willing to operate wheelchair safely.    Please check all that apply: Patient mobility limitations cannot be sufficiently resolved by the use of other ambulatory therapies.    Please check all that apply: The patient requires the use of a w/c for activities of daily living within the Home.    Vendor: [facility] to pull from SNF depot  Expected Date of Delivery: 4/15/2020         No driving until:  Order Comments: Cleared by PCP       Notify your health care provider if you experience any of the following:  temperature >100.4       Notify your health care provider if you experience any of the following:  persistent nausea and vomiting or diarrhea       Notify your health care provider if you experience any of the following:  severe uncontrolled pain       Notify your health care provider if you experience any of the following:  redness, tenderness, or signs of infection (pain, swelling, redness, odor or green/yellow discharge around incision site)       Notify your health care provider if you experience any of the following:  difficulty breathing or increased cough       Notify your health care provider if you experience any of the following:  persistent dizziness, light-headedness, or visual disturbances       Notify your health care provider if you experience any of the following:  increased confusion or weakness       Activity as tolerated        Significant Diagnostic Studies: Labs:   BMP:     Recent " Labs  Lab 04/15/20  0418  GLU 83    K 3.8    CO2 26  BUN 19  CREATININE 1.0  CALCIUM 8.8  MG 2.0   and CBC No results for input(s): WBC, HGB, HCT, PLT in the last 48 hours.       Pending Diagnostic Studies:     None        Medications:  Reconciled Home Medications:        Medication List     CHANGE how you take these medications   furosemide 40 MG tablet  Commonly known as:  LASIX  Take 1 tablet (40 mg total) by mouth once daily. TAKE 1 TABLET(40 MG) BY MOUTH DAILY  What changed:  additional instructions     HYDROcodone-acetaminophen 5-325 mg per tablet  Commonly known as:  NORCO  Take 1 tablet by mouth every 8 (eight) hours as needed.  What changed:  reasons to take this     predniSONE 10 MG tablet  Commonly known as:  DELTASONE  20 mg daily indefinitely until seen by lung doctor  What changed:  additional instructions        CONTINUE taking these medications   acetaminophen 325 MG tablet  Commonly known as:  TYLENOL  Take 2 tablets (650 mg total) by mouth every 6 (six) hours as needed for Pain or Temperature greater than (101).     amLODIPine 10 MG tablet  Commonly known as:  NORVASC  Take 10 mg by mouth once daily.     aspirin 81 MG EC tablet  Commonly known as:  ECOTRIN  Take 81 mg by mouth once daily.     atorvastatin 40 MG tablet  Commonly known as:  LIPITOR  Take 1 tablet (40 mg total) by mouth once daily.     baclofen 10 MG tablet  Commonly known as:  LIORESAL  Take 10 mg by mouth 3 (three) times daily as needed.     ergocalciferol 50,000 unit Cap  Commonly known as:  ERGOCALCIFEROL  Take 1 capsule (50,000 Units total) by mouth every 7 days. For total 8 weeks     hydroxychloroquine 200 mg tablet  Commonly known as:  PLAQUENIL  Take 1 tablet (200 mg total) by mouth 2 (two) times daily.     magnesium oxide 400 mg (241.3 mg magnesium) tablet  Commonly known as:  MAG-OX  Take 400 mg by mouth once daily.     melatonin 3 mg tablet  Commonly known as:  MELATIN  Take 2 tablets (6 mg total) by mouth  nightly as needed for Insomnia.     montelukast 10 mg tablet  Commonly known as:  SINGULAIR  Take 1 tablet (10 mg total) by mouth every evening.     mycophenolate 500 mg Tab  Commonly known as:  CELLCEPT  Take 1 tablet (500 mg total) by mouth 2 (two) times daily.     omeprazole 20 MG capsule  Commonly known as:  PRILOSEC  Take 1 capsule (20 mg total) by mouth once daily.     polyethylene glycol 17 gram Pwpk  Commonly known as:  GLYCOLAX  Take 17 g by mouth daily as needed.     sulfamethoxazole-trimethoprim 800-160mg 800-160 mg Tab  Commonly known as:  BACTRIM DS  Take 1 tablet by mouth every Mon, Wed, Fri.     thiamine 100 MG tablet  Take 100 mg by mouth once daily.        STOP taking these medications   albuterol-ipratropium 2.5 mg-0.5 mg/3 mL nebulizer solution  Commonly known as:  DUO-NEB     dicyclomine 10 MG capsule  Commonly known as:  BENTYL     potassium chloride SA 10 MEQ tablet  Commonly known as:  K-DUR,KLOR-CON              Indwelling Lines/Drains at time of discharge:     Lines/Drains/Airways     None                     Time spent on the discharge of patient: 33 minutes  Patient was seen and examined via tele Health on the date of discharge and determined to be suitable for discharge.           Amber Bay NP  Department of Hospital Medicine  [facility]    Cosigned by: Denita Acosta MD at 4/15/2020  5:34 PM        Review of Systems   Constitutional: Positive for fatigue. Negative for activity change and unexpected weight change.   HENT: Negative for hearing loss, rhinorrhea and trouble swallowing.    Eyes: Positive for discharge. Negative for visual disturbance.   Respiratory: Negative for chest tightness, shortness of breath and wheezing.    Cardiovascular: Negative for chest pain and palpitations.   Gastrointestinal: Negative for blood in stool, constipation, diarrhea and vomiting.   Endocrine: Positive for polyuria. Negative for polydipsia.   Genitourinary: Negative for  difficulty urinating, dysuria, hematuria and menstrual problem.   Musculoskeletal: Positive for arthralgias, gait problem and neck pain. Negative for joint swelling.   Neurological: Negative for weakness and headaches.   Psychiatric/Behavioral: Negative for confusion and dysphoric mood.       Objective:      Physical Exam    Assessment:       1. Acute respiratory failure with hypoxia    2. Debilitated    3. Generalized weakness    4. Urinary tract infection without hematuria, site unspecified    5. Seropositive rheumatoid arthritis of multiple sites    6. Chronic renal failure syndrome, stage 3 (moderate)    7. Cryptogenic organizing pneumonia    8. Vitamin D deficiency        Plan:     Medication List with Changes/Refills   Current Medications    ACETAMINOPHEN (TYLENOL) 325 MG TABLET    Take 2 tablets (650 mg total) by mouth every 6 (six) hours as needed for Pain or Temperature greater than (101).    ASPIRIN (ECOTRIN) 81 MG EC TABLET    Take 81 mg by mouth once daily.    ATORVASTATIN (LIPITOR) 40 MG TABLET    Take 1 tablet (40 mg total) by mouth once daily.    BACLOFEN (LIORESAL) 10 MG TABLET    Take 10 mg by mouth 3 (three) times daily as needed.    FUROSEMIDE (LASIX) 40 MG TABLET    Take 1 tablet (40 mg total) by mouth once daily. TAKE 1 TABLET(40 MG) BY MOUTH DAILY    HYDROCODONE-ACETAMINOPHEN (NORCO) 5-325 MG PER TABLET    Take 1 tablet by mouth every 8 (eight) hours as needed.    HYDROXYCHLOROQUINE (PLAQUENIL) 200 MG TABLET    Take 1 tablet (200 mg total) by mouth 2 (two) times daily.    MAGNESIUM OXIDE (MAG-OX) 400 MG (241.3 MG MAGNESIUM) TABLET    Take 400 mg by mouth once daily.    MELATONIN (MELATIN) 3 MG TABLET    Take 2 tablets (6 mg total) by mouth nightly as needed for Insomnia.    MONTELUKAST (SINGULAIR) 10 MG TABLET    Take 1 tablet (10 mg total) by mouth every evening.    MYCOPHENOLATE (CELLCEPT) 500 MG TAB    Take 1 tablet (500 mg total) by mouth 2 (two) times daily.    OMEPRAZOLE (PRILOSEC) 20 MG  CAPSULE    Take 1 capsule (20 mg total) by mouth once daily.    POLYETHYLENE GLYCOL (GLYCOLAX) 17 GRAM PWPK    Take 17 g by mouth daily as needed.    PREDNISONE (DELTASONE) 10 MG TABLET    20 mg daily indefinitely until seen by lung doctor    THIAMINE 100 MG TABLET    Take 100 mg by mouth once daily.   Changed and/or Refilled Medications    Modified Medication Previous Medication    AMLODIPINE (NORVASC) 10 MG TABLET amLODIPine (NORVASC) 10 MG tablet       Take 1 tablet (10 mg total) by mouth once daily.    Take 10 mg by mouth once daily.    ERGOCALCIFEROL (ERGOCALCIFEROL) 50,000 UNIT CAP ergocalciferol (ERGOCALCIFEROL) 50,000 unit Cap       Take 1 capsule (50,000 Units total) by mouth every 7 days. For total 8 weeks for 8 doses    Take 1 capsule (50,000 Units total) by mouth every 7 days. For total 8 weeks    SULFAMETHOXAZOLE-TRIMETHOPRIM 800-160MG (BACTRIM DS) 800-160 MG TAB sulfamethoxazole-trimethoprim 800-160mg (BACTRIM DS) 800-160 mg Tab       Take 1 tablet by mouth every Mon, Wed, Fri.    Take 1 tablet by mouth every Mon, Wed, Fri.     Diagnoses and all orders for this visit:    Acute respiratory failure with hypoxia    Debilitated    Generalized weakness    Urinary tract infection without hematuria, site unspecified    Seropositive rheumatoid arthritis of multiple sites  -     Ambulatory referral/consult to Rheumatology; Future    Chronic renal failure syndrome, stage 3 (moderate)    Cryptogenic organizing pneumonia    Vitamin D deficiency    Other orders  -     sulfamethoxazole-trimethoprim 800-160mg (BACTRIM DS) 800-160 mg Tab; Take 1 tablet by mouth every Mon, Wed, Fri.  -     amLODIPine (NORVASC) 10 MG tablet; Take 1 tablet (10 mg total) by mouth once daily.  -     ergocalciferol (ERGOCALCIFEROL) 50,000 unit Cap; Take 1 capsule (50,000 Units total) by mouth every 7 days. For total 8 weeks for 8 doses      See meds, orders, follow up, routing and instructions sections of encounter and AVS. Discussed with  patient and provided on AVS.    Technically this could be considered a face-to-face for home health should the necessity for reorder occur.  Current expectation is 4-6 weeks of treatment then discontinuation.  Goal of treatment would be gait and balance restoration, strength and conditioning.    Keep follow-up with Pulmonary and will add Rheumatology re- consult.  She missed her appointment while hospitalized.

## 2020-04-21 ENCOUNTER — OFFICE VISIT (OUTPATIENT)
Dept: PULMONOLOGY | Facility: CLINIC | Age: 85
End: 2020-04-21
Payer: MEDICARE

## 2020-04-21 DIAGNOSIS — M06.9 RHEUMATOID ARTHRITIS, INVOLVING UNSPECIFIED SITE, UNSPECIFIED RHEUMATOID FACTOR PRESENCE: ICD-10-CM

## 2020-04-21 DIAGNOSIS — J84.9 ILD (INTERSTITIAL LUNG DISEASE): Primary | ICD-10-CM

## 2020-04-21 PROCEDURE — 99214 PR OFFICE/OUTPT VISIT, EST, LEVL IV, 30-39 MIN: ICD-10-PCS | Mod: 95,,, | Performed by: INTERNAL MEDICINE

## 2020-04-21 PROCEDURE — 99214 OFFICE O/P EST MOD 30 MIN: CPT | Mod: 95,,, | Performed by: INTERNAL MEDICINE

## 2020-04-21 NOTE — PROGRESS NOTES
Subjective:      Patient ID: Selma Lux is a 84 y.o. female.    Chief Complaint: No chief complaint on file.  The patient location is: home  The chief complaint leading to consultation is: ILD  Visit type: audiovisual  Total time spent with patient: 30  Each patient to whom he or she provides medical services by telemedicine is:  (1) informed of the relationship between the physician and patient and the respective role of any other health care provider with respect to management of the patient; and (2) notified that he or she may decline to receive medical services by telemedicine and may withdraw from such care at any time.    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 She is a patient of Dr. Lonnie Rouse RA and Dr. Baldomero Francis. She was last seen by them in January and was scheduled for a routine visit at this time. She has a hx of cryptogenic pulmonary fibrosis treated with steroids and on several attempts to wean down she had a flare of her fibrosis (question of rheumatoid lung). She is being seen on telemed visit and states that after she is doing well after her recent hospitalization. States that her Sa01; 97%  She never began her steroid taper and is on 20 mg daily. Will review with Dr. Rouse and see if he want me to see her and do PFT's before weaning the dose or just do it empirically at a slow rate and then see her with PFT;s  HPI      No flowsheet data found.  Review of Systems   Constitutional:        Telemedicine visit, [place] and in no distress, is taking home PT   HENT: Negative.    Eyes: Negative.    Respiratory: Negative.         Cryptogenic Organizing Pneumonia   Cardiovascular: Negative.    Genitourinary: Negative.    Musculoskeletal: Negative.         RA    Skin: Negative.    Gastrointestinal: Negative.    Neurological: Negative.         Hx of recent TIA's   Psychiatric/Behavioral:  Negative.    All other systems reviewed and are negative.    Objective:     Physical Exam    Assessment:     1. ILD (interstitial lung disease)      Outpatient Encounter Medications as of 4/21/2020   Medication Sig Dispense Refill    acetaminophen (TYLENOL) 325 MG tablet Take 2 tablets (650 mg total) by mouth every 6 (six) hours as needed for Pain or Temperature greater than (101).  0    amLODIPine (NORVASC) 10 MG tablet Take 1 tablet (10 mg total) by mouth once daily. 90 tablet 1    aspirin (ECOTRIN) 81 MG EC tablet Take 81 mg by mouth once daily.      atorvastatin (LIPITOR) 40 MG tablet Take 1 tablet (40 mg total) by mouth once daily. 90 tablet 3    baclofen (LIORESAL) 10 MG tablet Take 10 mg by mouth 3 (three) times daily as needed.      ergocalciferol (ERGOCALCIFEROL) 50,000 unit Cap Take 1 capsule (50,000 Units total) by mouth every 7 days. For total 8 weeks for 8 doses 8 capsule 0    furosemide (LASIX) 40 MG tablet Take 1 tablet (40 mg total) by mouth once daily. TAKE 1 TABLET(40 MG) BY MOUTH DAILY      HYDROcodone-acetaminophen (NORCO) 5-325 mg per tablet Take 1 tablet by mouth every 8 (eight) hours as needed. 30 tablet 0    hydroxychloroquine (PLAQUENIL) 200 mg tablet Take 1 tablet (200 mg total) by mouth 2 (two) times daily. 180 tablet 1    magnesium oxide (MAG-OX) 400 mg (241.3 mg magnesium) tablet Take 400 mg by mouth once daily.      melatonin (MELATIN) 3 mg tablet Take 2 tablets (6 mg total) by mouth nightly as needed for Insomnia.  0    montelukast (SINGULAIR) 10 mg tablet Take 1 tablet (10 mg total) by mouth every evening. 90 tablet 0    mycophenolate (CELLCEPT) 500 mg Tab Take 1 tablet (500 mg total) by mouth 2 (two) times daily. 180 tablet 0    omeprazole (PRILOSEC) 20 MG capsule Take 1 capsule (20 mg total) by mouth once daily. 90 capsule 0    polyethylene glycol (GLYCOLAX) 17 gram PwPk Take 17 g by mouth daily as needed.  0    predniSONE (DELTASONE) 10 MG tablet 20 mg daily  indefinitely until seen by lung doctor      sulfamethoxazole-trimethoprim 800-160mg (BACTRIM DS) 800-160 mg Tab Take 1 tablet by mouth every Mon, Wed, Fri. 12 tablet 1    thiamine 100 MG tablet Take 100 mg by mouth once daily.       Facility-Administered Encounter Medications as of 4/21/2020   Medication Dose Route Frequency Provider Last Rate Last Dose    onabotulinumtoxina injection 200 Units  200 Units Intramuscular Q90 Days Baldomero Giang MD   200 Units at 01/09/20 0104       Plan:   Review with Dr. Rouse about tapering prednisone or wait until clinic is back to full service and see back.  Spoke to Dr. Rouse and he said that if I felt that her oxygneation was good to proceed with a slow taper with holding at 5 mg as maintenance and see her back in 3 months.  4/30: sPOKE TO THE PATIENT AND HER DAUGHTER THEY SAY THAT SHE CAN;T GO TO 5 MG OF PREDNISONE, ALWAYS WINDS UP IN THE HOSPTIAL UNDER 10 MG  WILL DROP 2.5 MG EVERY TWO WEEKS STARTING MAY 1ST. WHEN  SHE GET TO 10 MG SHE IS TO CHECK IN WITH DR. ROUSE  Problem List Items Addressed This Visit     None      Visit Diagnoses     ILD (interstitial lung disease)    -  Primary

## 2020-04-22 RX ORDER — PREDNISONE 5 MG/1
TABLET ORAL
Qty: 90 TABLET | Refills: 1 | Status: SHIPPED | OUTPATIENT
Start: 2020-04-22 | End: 2020-07-10 | Stop reason: ALTCHOICE

## 2020-04-26 ENCOUNTER — PATIENT OUTREACH (OUTPATIENT)
Dept: ADMINISTRATIVE | Facility: OTHER | Age: 85
End: 2020-04-26

## 2020-04-27 ENCOUNTER — OFFICE VISIT (OUTPATIENT)
Dept: CARDIOLOGY | Facility: CLINIC | Age: 85
End: 2020-04-27
Payer: MEDICARE

## 2020-04-27 ENCOUNTER — TELEPHONE (OUTPATIENT)
Dept: CARDIOLOGY | Facility: CLINIC | Age: 85
End: 2020-04-27

## 2020-04-27 DIAGNOSIS — I67.9 SMALL VESSEL DISEASE, CEREBROVASCULAR: ICD-10-CM

## 2020-04-27 DIAGNOSIS — I70.0 AORTIC ATHEROSCLEROSIS: ICD-10-CM

## 2020-04-27 DIAGNOSIS — I10 HYPERTENSION, ESSENTIAL: Primary | ICD-10-CM

## 2020-04-27 DIAGNOSIS — J84.116 CRYPTOGENIC ORGANIZING PNEUMONIA: ICD-10-CM

## 2020-04-27 DIAGNOSIS — I48.0 AF (PAROXYSMAL ATRIAL FIBRILLATION): ICD-10-CM

## 2020-04-27 DIAGNOSIS — I87.2 VENOUS INSUFFICIENCY: ICD-10-CM

## 2020-04-27 PROCEDURE — 99214 PR OFFICE/OUTPT VISIT, EST, LEVL IV, 30-39 MIN: ICD-10-PCS | Mod: 95,,, | Performed by: INTERNAL MEDICINE

## 2020-04-27 PROCEDURE — 99214 OFFICE O/P EST MOD 30 MIN: CPT | Mod: 95,,, | Performed by: INTERNAL MEDICINE

## 2020-04-27 NOTE — Clinical Note
plz let the patient know that I ordered hospital bed. plz also ask her that I will call her regarding a blood thinner I prescibed at 10 am  je

## 2020-04-27 NOTE — TELEPHONE ENCOUNTER
----- Message from Farida Garcia MD sent at 4/27/2020  9:23 AM CDT -----  plz let the patient know that I ordered hospital bed. plz also ask her that I will call her regarding a blood thinner I prescibed at 10 am  je

## 2020-04-27 NOTE — PROGRESS NOTES
Subjective:   Patient ID:  Selma Alonzo Lux is a 84 y.o. female who presents for follow-up of No chief complaint on file.  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 Last seen in November for intermittent lower ext edema on lasix 20. No other CHF signs/symptoms. Echo with EF reported as 40%; reviewed with echo staff, EF appeared stable, low normal 50-55%. Trialed increased lasix 40 qd; BMP stable. Felt SANAZ improved slightly for about one week then no change.     Returns for follow up today. Ongoing SANAZ, stable. Unable to tolerate knee high compression stockings previously. Denies PND, orthopnea, CP, palpitiatons, presyncope/syncope. Chronic dyspnea slightly worse; predominantly decreased energy, fatigue and generalized weakness. Feels like it requires more effort for routine activities. Have adjusted PT plans to see if can help with her strength. Also recently adjusted on her RA meds, limited by hip pain.      Also today here to discuss current cardiac meds and if we can decrease her number of medications. Carvedilol off for last week, wanted to see if she could stop. Started at time of paroxysmal AF; no other BB indication. Also on ASA + plavix for years; unclear why plavix initially started - ?at time of MRI with chronic microvascular change and remote punctate stroke. No prior stenting. No clinical prior CVA.          HPI:   BP has been running  normal at home.  Denies palpitations or fluttering in the chest  No chest pain, Orthopnea, PND of heart failure symptoms.   SOB on exertion.   Her leg swelling is unchanged and she cannot tolerate compression stalkings and cannot raise her leg lying flat because she gets SOB with her lung disease.     The patient location is: [place]  The chief complaint leading to consultation is: leg edema, PAF  AV visit  Total time spent with patient: 19 min  Each patient to whom he or she provides medical services by telemedicine is:  (1) informed of the relationship between the physician and patient and the respective role of any other health care provider with respect to management of the patient; and (2) notified that he or she may decline to receive medical services by telemedicine and may withdraw from such care at any time.    Notes: see ximena          Patient Active Problem List   Diagnosis    Iron deficiency anemia secondary to inadequate dietary iron intake    PUD (peptic ulcer disease)    Submucous leiomyoma of uterus    Cystoid macular edema, left eye    Seropositive rheumatoid arthritis of multiple sites    Vitamin D deficiency    Hypertension, essential    Long QT interval    Generalized weakness    Vascular dementia    History of stroke    Chronic renal failure syndrome, stage 3 (moderate)    Oropharyngeal dysphagia    AF (paroxysmal atrial fibrillation)    Aortic atherosclerosis    Retinal macroaneurysm of left eye    Thrombocytopenia    Dystonia    Poor nutrition    Cryptogenic organizing pneumonia    Multinodular goiter    Pulmonary hypertension due to interstitial lung disease    Post-traumatic osteoarthritis of both hips    Cerebral microvascular disease    Immunosuppressed status    History of transient ischemic attack (TIA)    Hyperlipidemia    Thyroid antibody positive    Primary osteoarthritis of left hip    Mood disorder    Class 1 obesity due to excess calories with  serious comorbidity in adult    Spinal stenosis    Lumbar spondylosis    Acute cystitis without hematuria    Arthritis    Acute respiratory failure with hypoxia    Respiratory failure     LMP  (LMP Unknown)   There is no height or weight on file to calculate BMI.  CrCl cannot be calculated (Patient's most recent lab result is older than the maximum 7 days allowed.).    Lab Results   Component Value Date     04/15/2020    K 3.8 04/15/2020     04/15/2020    CO2 26 04/15/2020    BUN 19 04/15/2020    CREATININE 1.0 04/15/2020    GLU 83 04/15/2020    HGBA1C 5.6 08/27/2019    MG 2.0 04/15/2020    AST 28 03/06/2020    ALT 17 03/06/2020    ALBUMIN 3.5 04/15/2020    PROT 5.7 (L) 03/06/2020    BILITOT 0.3 03/06/2020    WBC 7.90 04/08/2020    HGB 11.0 (L) 04/08/2020    HCT 37.0 04/08/2020    HCT 36 08/27/2019    MCV 90 04/08/2020     04/08/2020    INR 1.0 08/28/2019    TSH 0.395 (L) 02/28/2020    CHOL 197 08/26/2019    HDL 90 (H) 08/26/2019    LDLCALC 91.6 08/26/2019    TRIG 77 08/26/2019       Current Outpatient Medications   Medication Sig    acetaminophen (TYLENOL) 325 MG tablet Take 2 tablets (650 mg total) by mouth every 6 (six) hours as needed for Pain or Temperature greater than (101).    amLODIPine (NORVASC) 10 MG tablet Take 1 tablet (10 mg total) by mouth once daily.    aspirin (ECOTRIN) 81 MG EC tablet Take 81 mg by mouth once daily.    atorvastatin (LIPITOR) 40 MG tablet Take 1 tablet (40 mg total) by mouth once daily.    baclofen (LIORESAL) 10 MG tablet Take 10 mg by mouth 3 (three) times daily as needed.    ergocalciferol (ERGOCALCIFEROL) 50,000 unit Cap Take 1 capsule (50,000 Units total) by mouth every 7 days. For total 8 weeks for 8 doses    furosemide (LASIX) 40 MG tablet Take 1 tablet (40 mg total) by mouth once daily. TAKE 1 TABLET(40 MG) BY MOUTH DAILY    HYDROcodone-acetaminophen (NORCO) 5-325 mg per tablet Take 1 tablet by mouth every 8 (eight) hours as needed.     hydroxychloroquine (PLAQUENIL) 200 mg tablet Take 1 tablet (200 mg total) by mouth 2 (two) times daily.    magnesium oxide (MAG-OX) 400 mg (241.3 mg magnesium) tablet Take 400 mg by mouth once daily.    melatonin (MELATIN) 3 mg tablet Take 2 tablets (6 mg total) by mouth nightly as needed for Insomnia.    montelukast (SINGULAIR) 10 mg tablet Take 1 tablet (10 mg total) by mouth every evening.    mycophenolate (CELLCEPT) 500 mg Tab Take 1 tablet (500 mg total) by mouth 2 (two) times daily.    omeprazole (PRILOSEC) 20 MG capsule Take 1 capsule (20 mg total) by mouth once daily.    polyethylene glycol (GLYCOLAX) 17 gram PwPk Take 17 g by mouth daily as needed.    predniSONE (DELTASONE) 10 MG tablet 20 mg daily indefinitely until seen by lung doctor    predniSONE (DELTASONE) 5 MG tablet Begin tapering prednisone. Take 3 tablets  Q AMx 2 weeks , then 2 tablets  Q AMx 2 weeks and then maintenance of one tablet daily.    sulfamethoxazole-trimethoprim 800-160mg (BACTRIM DS) 800-160 mg Tab Take 1 tablet by mouth every Mon, Wed, Fri.    thiamine 100 MG tablet Take 100 mg by mouth once daily.     Current Facility-Administered Medications   Medication    onabotulinumtoxina injection 200 Units       ROS    Objective:   Physical Exam   Constitutional:   n/a       Assessment:     1. Hypertension, essential    2. AF (paroxysmal atrial fibrillation)    3. Aortic atherosclerosis    4. Small vessel disease, cerebrovascular    5. Venous insufficiency    6. Cryptogenic organizing pneumonia        Plan:   We discussed her risk of recurrence of AF given she has severe lung disease and prior TIA. That eliquis should  Be inititated (CHADVASC 4) continue ASA as well if there is a risk of bleeding than we can stop ASA  encourage exercise  Patient will benefit from hospital bed given she cannot like flat in bed due to severe lung disease.   No other changes in meds.

## 2020-04-29 ENCOUNTER — PATIENT MESSAGE (OUTPATIENT)
Dept: CARDIOLOGY | Facility: CLINIC | Age: 85
End: 2020-04-29

## 2020-04-29 DIAGNOSIS — J84.116 CRYPTOGENIC ORGANIZING PNEUMONIA: ICD-10-CM

## 2020-04-29 DIAGNOSIS — M05.79 SEROPOSITIVE RHEUMATOID ARTHRITIS OF MULTIPLE SITES: ICD-10-CM

## 2020-04-29 RX ORDER — MYCOPHENOLATE MOFETIL 500 MG/1
500 TABLET ORAL 2 TIMES DAILY
Qty: 180 TABLET | Refills: 0 | Status: SHIPPED | OUTPATIENT
Start: 2020-04-29 | End: 2020-08-07 | Stop reason: SDUPTHER

## 2020-04-29 RX ORDER — HYDROXYCHLOROQUINE SULFATE 200 MG/1
200 TABLET, FILM COATED ORAL 2 TIMES DAILY
Qty: 180 TABLET | Refills: 1 | Status: SHIPPED | OUTPATIENT
Start: 2020-04-29 | End: 2020-06-30 | Stop reason: SDUPTHER

## 2020-04-30 ENCOUNTER — EXTERNAL HOME HEALTH (OUTPATIENT)
Dept: HOME HEALTH SERVICES | Facility: HOSPITAL | Age: 85
End: 2020-04-30
Payer: MEDICARE

## 2020-05-13 ENCOUNTER — TELEPHONE (OUTPATIENT)
Dept: PULMONOLOGY | Facility: CLINIC | Age: 85
End: 2020-05-13

## 2020-05-18 ENCOUNTER — DOCUMENT SCAN (OUTPATIENT)
Dept: HOME HEALTH SERVICES | Facility: HOSPITAL | Age: 85
End: 2020-05-18
Payer: MEDICARE

## 2020-05-22 ENCOUNTER — PATIENT MESSAGE (OUTPATIENT)
Dept: RHEUMATOLOGY | Facility: CLINIC | Age: 85
End: 2020-05-22

## 2020-05-28 ENCOUNTER — PATIENT OUTREACH (OUTPATIENT)
Dept: ADMINISTRATIVE | Facility: OTHER | Age: 85
End: 2020-05-28

## 2020-06-01 ENCOUNTER — PATIENT MESSAGE (OUTPATIENT)
Dept: NEUROLOGY | Facility: CLINIC | Age: 85
End: 2020-06-01

## 2020-06-01 ENCOUNTER — OFFICE VISIT (OUTPATIENT)
Dept: RHEUMATOLOGY | Facility: CLINIC | Age: 85
End: 2020-06-01
Payer: MEDICARE

## 2020-06-01 VITALS
BODY MASS INDEX: 29.19 KG/M2 | SYSTOLIC BLOOD PRESSURE: 137 MMHG | WEIGHT: 171 LBS | DIASTOLIC BLOOD PRESSURE: 85 MMHG | HEIGHT: 64 IN | HEART RATE: 94 BPM

## 2020-06-01 DIAGNOSIS — M05.79 SEROPOSITIVE RHEUMATOID ARTHRITIS OF MULTIPLE SITES: ICD-10-CM

## 2020-06-01 DIAGNOSIS — J84.116 CRYPTOGENIC ORGANIZING PNEUMONIA: ICD-10-CM

## 2020-06-01 PROBLEM — D69.6 THROMBOCYTOPENIA: Status: RESOLVED | Noted: 2018-12-16 | Resolved: 2020-06-01

## 2020-06-01 PROBLEM — M19.90 ARTHRITIS: Status: RESOLVED | Noted: 2020-02-27 | Resolved: 2020-06-01

## 2020-06-01 PROBLEM — J96.90 RESPIRATORY FAILURE: Status: RESOLVED | Noted: 2020-03-27 | Resolved: 2020-06-01

## 2020-06-01 PROCEDURE — 99999 PR PBB SHADOW E&M-EST. PATIENT-LVL III: ICD-10-PCS | Mod: PBBFAC,,, | Performed by: INTERNAL MEDICINE

## 2020-06-01 PROCEDURE — 99213 OFFICE O/P EST LOW 20 MIN: CPT | Mod: PBBFAC | Performed by: INTERNAL MEDICINE

## 2020-06-01 PROCEDURE — 99214 OFFICE O/P EST MOD 30 MIN: CPT | Mod: S$PBB,,, | Performed by: INTERNAL MEDICINE

## 2020-06-01 PROCEDURE — 99999 PR PBB SHADOW E&M-EST. PATIENT-LVL III: CPT | Mod: PBBFAC,,, | Performed by: INTERNAL MEDICINE

## 2020-06-01 PROCEDURE — 99214 PR OFFICE/OUTPT VISIT, EST, LEVL IV, 30-39 MIN: ICD-10-PCS | Mod: S$PBB,,, | Performed by: INTERNAL MEDICINE

## 2020-06-01 NOTE — ASSESSMENT & PLAN NOTE
RA is stable on HCQ, MMF and prednisone  Mild L hand deformities  April labs were ok  Will plan lab in 3 mo and RTC then  Will cont current meds but try reducing pred to 10 mg/d in July   Home

## 2020-06-01 NOTE — PROGRESS NOTES
"Subjective:       Patient ID: Selma Lux is a 84 y.o. female.    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   HENT: Positive for trouble swallowing.    Respiratory: Positive for cough and shortness of breath.         Back to baseline cough and shortness of breath   Cardiovascular: Negative for chest pain.   Gastrointestinal: Negative for constipation and diarrhea.         Objective:   /85   Pulse 94   Ht 5' 4" (1.626 m)   Wt 77.6 kg (171 lb)   LMP  (LMP Unknown)   BMI 29.35 kg/m²      Physical Exam   Constitutional: She is well-developed, well-nourished, and in no distress.   Pulmonary/Chest: No respiratory distress.   No increased work of breathing   Neurological:   Alert, awake, with no abnormal movements   Skin:     No rash on face; skin clear   Psychiatric:   Normal mood and " affect; clear, rational thinking; speech normal and not pressured         6/1/2020   Tender (SHEIKH-28) 0 / 28    Swollen (SHEIKH-28) 4 / 28    Provider Global Assessment Not documented   Patient Global Assessment Not documented   ESR Not documented   CRP Not documented   SHEIKH-28 (ESR) Not documented   SHEIKH-28 (CRP) Not documented     Lab Results   Component Value Date    SEDRATE <2 12/09/2019      Assessment:       1. Seropositive rheumatoid arthritis of multiple sites    2. Cryptogenic organizing pneumonia            Plan:       Problem List Items Addressed This Visit        Active Problems    Seropositive rheumatoid arthritis of multiple sites     RA is stable on HCQ, MMF and prednisone  Mild L hand deformities  April labs were ok  Will plan lab in 3 mo and RTC then  Will cont current meds but try reducing pred to 10 mg/d in July         Cryptogenic organizing pneumonia     No increase in sx recently; has reduced pred to 12.5 mg/d and is apprehensive about reducing further but discussed reducing to 10 mg/d in July  Will cont  bid

## 2020-06-01 NOTE — ASSESSMENT & PLAN NOTE
No increase in sx recently; has reduced pred to 12.5 mg/d and is apprehensive about reducing further but discussed reducing to 10 mg/d in July  Will cont  bid

## 2020-06-09 RX ORDER — FUROSEMIDE 40 MG/1
40 TABLET ORAL DAILY
Qty: 90 TABLET | Refills: 3 | Status: SHIPPED | OUTPATIENT
Start: 2020-06-09 | End: 2021-05-11 | Stop reason: SDUPTHER

## 2020-06-10 ENCOUNTER — TELEPHONE (OUTPATIENT)
Dept: INTERNAL MEDICINE | Facility: CLINIC | Age: 85
End: 2020-06-10

## 2020-06-10 NOTE — TELEPHONE ENCOUNTER
Contact: [facility] [phone] Marc Tran called requesting orders for extended PT.Marc states orders can be verbal or fax. Please call and advise

## 2020-06-11 ENCOUNTER — TELEPHONE (OUTPATIENT)
Dept: INTERNAL MEDICINE | Facility: CLINIC | Age: 85
End: 2020-06-11

## 2020-06-11 DIAGNOSIS — J84.116 CRYPTOGENIC ORGANIZING PNEUMONIA: ICD-10-CM

## 2020-06-11 DIAGNOSIS — N18.30 CHRONIC RENAL FAILURE SYNDROME, STAGE 3 (MODERATE): ICD-10-CM

## 2020-06-11 DIAGNOSIS — R53.1 GENERALIZED WEAKNESS: ICD-10-CM

## 2020-06-11 DIAGNOSIS — M16.12 PRIMARY OSTEOARTHRITIS OF LEFT HIP: ICD-10-CM

## 2020-06-11 DIAGNOSIS — F01.50 VASCULAR DEMENTIA WITHOUT BEHAVIORAL DISTURBANCE: ICD-10-CM

## 2020-06-11 DIAGNOSIS — R29.898 MUSCULAR DECONDITIONING: Primary | ICD-10-CM

## 2020-06-11 DIAGNOSIS — I27.23 PULMONARY HYPERTENSION DUE TO INTERSTITIAL LUNG DISEASE: ICD-10-CM

## 2020-06-11 DIAGNOSIS — Z86.73 HISTORY OF STROKE: ICD-10-CM

## 2020-06-11 DIAGNOSIS — M05.79 SEROPOSITIVE RHEUMATOID ARTHRITIS OF MULTIPLE SITES: ICD-10-CM

## 2020-06-11 DIAGNOSIS — J96.01 ACUTE RESPIRATORY FAILURE WITH HYPOXIA: ICD-10-CM

## 2020-06-11 DIAGNOSIS — J84.9 PULMONARY HYPERTENSION DUE TO INTERSTITIAL LUNG DISEASE: ICD-10-CM

## 2020-06-11 NOTE — TELEPHONE ENCOUNTER
Notified pt's daughter that we rec'd the request for PT in the home d/t pt's Immunosuppressed status. Currently awaiting orders.

## 2020-06-11 NOTE — TELEPHONE ENCOUNTER
Contact: [facility] / [place] [phone]  The patient is requesting an extension of at home PT instead of going to outpatient therapy.    Thank you

## 2020-06-23 ENCOUNTER — TELEPHONE (OUTPATIENT)
Dept: INTERNAL MEDICINE | Facility: CLINIC | Age: 85
End: 2020-06-23

## 2020-06-23 NOTE — TELEPHONE ENCOUNTER
Necessity for high-dose vitamin-D prescription is currently under review.  May need to get a vitamin-D level.  Thank you

## 2020-06-24 ENCOUNTER — PATIENT MESSAGE (OUTPATIENT)
Dept: NEUROLOGY | Facility: CLINIC | Age: 85
End: 2020-06-24

## 2020-06-25 ENCOUNTER — TELEPHONE (OUTPATIENT)
Dept: INTERNAL MEDICINE | Facility: CLINIC | Age: 85
End: 2020-06-25

## 2020-06-25 DIAGNOSIS — G24.3 CERVICAL DYSTONIA: Primary | ICD-10-CM

## 2020-06-25 DIAGNOSIS — E55.9 VITAMIN D DEFICIENCY: Primary | ICD-10-CM

## 2020-06-26 NOTE — TELEPHONE ENCOUNTER
My concern is that high-dose vitamin D supplements can ultimately be toxic.  We would like to treat based on vitamin-D level.  It is unclear from chart review who wrote the current prescription.  It may have been prescribed during her hospitalization.    I would like to check a vitamin-D level to see if she still needs is or we can switch to an over-the-counter daily supplement.    See lab orders and please call patient to schedule ordered lab(s), if it is possible for her to get that drawn. Thank you.

## 2020-06-27 NOTE — TELEPHONE ENCOUNTER
Notified pt's daughter Susu of message from PCP and she agreed to have lab scheduled for Mon 06/29/2020.

## 2020-06-30 ENCOUNTER — EXTERNAL CHRONIC CARE MANAGEMENT (OUTPATIENT)
Dept: PRIMARY CARE CLINIC | Facility: CLINIC | Age: 85
End: 2020-06-30
Payer: MEDICARE

## 2020-06-30 DIAGNOSIS — M05.79 SEROPOSITIVE RHEUMATOID ARTHRITIS OF MULTIPLE SITES: ICD-10-CM

## 2020-06-30 PROCEDURE — 99490 CHRNC CARE MGMT STAFF 1ST 20: CPT | Mod: S$PBB,,, | Performed by: FAMILY MEDICINE

## 2020-06-30 PROCEDURE — 99490 CHRNC CARE MGMT STAFF 1ST 20: CPT | Mod: PBBFAC | Performed by: FAMILY MEDICINE

## 2020-06-30 PROCEDURE — 99490 PR CHRONIC CARE MGMT, 1ST 20 MIN: ICD-10-PCS | Mod: S$PBB,,, | Performed by: FAMILY MEDICINE

## 2020-06-30 RX ORDER — HYDROXYCHLOROQUINE SULFATE 200 MG/1
200 TABLET, FILM COATED ORAL 2 TIMES DAILY
Qty: 180 TABLET | Refills: 1 | Status: SHIPPED | OUTPATIENT
Start: 2020-06-30 | End: 2021-03-01 | Stop reason: SDUPTHER

## 2020-07-01 PROBLEM — N30.00 ACUTE CYSTITIS WITHOUT HEMATURIA: Status: RESOLVED | Noted: 2020-02-27 | Resolved: 2020-07-01

## 2020-07-02 NOTE — TELEPHONE ENCOUNTER
Patient's last home health orders were written post discharge in April.  Her last face-to-face with me was a tele health visit in April.  I called daughter to try to gather appropriate information.Extensive discussion with daughter at this time.  They had requested extension of home physical therapy about 3 weeks ago.  We had not seen her is a face-to-face since .  Special considerations were in efect until approximately May 15 as the result of the COVID-19 crisis, restriction of in clinic health care.    She had been discharged from hospitalization for altered mental status, urinary tract infection, pneumonia.  She has an extensive medical history, debilitated.  Currently on long-term prednisone and other immunosuppressant medication for her rheumatoid arthritis and pulmonary disease.  Daughter states that the rheumatologist and pulmonologist have agreed on a plan concerning long-term prednisone management    She is at extreme risk for complications in the face of a COVID-19 infection.  Transitioning to outpatient based physical therapy would pose undue risk.  Her previous home health orders .  Since then she has experienced progressive weakness due to inactivity and medical conditions.      Daughter is requesting home physical therapy for a few weeks.  This home health order is to initiate home physical therapy program, twice weekly physical therapy visits with coaching.  Initial orders for a period of 6 weeks.    Awaiting vitamin-D clarification.    Also discussed home-based medical care with them.  Given the current situation with COVID-19, her multiple disabilities, I think this would be a good idea, daughter agreed.  Will continue to follow and also would recommend set up a tele health visit sometime soon with me.

## 2020-07-08 PROCEDURE — G0180 MD CERTIFICATION HHA PATIENT: HCPCS | Mod: ,,, | Performed by: FAMILY MEDICINE

## 2020-07-08 PROCEDURE — G0180 PR HOME HEALTH MD CERTIFICATION: ICD-10-PCS | Mod: ,,, | Performed by: FAMILY MEDICINE

## 2020-07-10 ENCOUNTER — OFFICE VISIT (OUTPATIENT)
Dept: INTERNAL MEDICINE | Facility: CLINIC | Age: 85
End: 2020-07-10
Attending: FAMILY MEDICINE
Payer: MEDICARE

## 2020-07-10 ENCOUNTER — OFFICE VISIT (OUTPATIENT)
Dept: SPINE | Facility: CLINIC | Age: 85
End: 2020-07-10
Attending: PHYSICAL MEDICINE & REHABILITATION
Payer: MEDICARE

## 2020-07-10 DIAGNOSIS — I48.0 AF (PAROXYSMAL ATRIAL FIBRILLATION): ICD-10-CM

## 2020-07-10 DIAGNOSIS — R53.81 DEBILITY: Primary | ICD-10-CM

## 2020-07-10 DIAGNOSIS — D84.9 IMMUNOSUPPRESSED STATUS: ICD-10-CM

## 2020-07-10 DIAGNOSIS — M54.2 CERVICALGIA: ICD-10-CM

## 2020-07-10 DIAGNOSIS — G25.5 HEMICHOREA: ICD-10-CM

## 2020-07-10 DIAGNOSIS — Z86.73 HISTORY OF STROKE: ICD-10-CM

## 2020-07-10 DIAGNOSIS — M62.838 MUSCLE SPASM: ICD-10-CM

## 2020-07-10 DIAGNOSIS — M19.012 PRIMARY OSTEOARTHRITIS OF BOTH SHOULDERS: ICD-10-CM

## 2020-07-10 DIAGNOSIS — Z79.01 ANTICOAGULANT LONG-TERM USE: ICD-10-CM

## 2020-07-10 DIAGNOSIS — F01.50 VASCULAR DEMENTIA WITHOUT BEHAVIORAL DISTURBANCE: ICD-10-CM

## 2020-07-10 DIAGNOSIS — N18.30 CHRONIC RENAL FAILURE SYNDROME, STAGE 3 (MODERATE): ICD-10-CM

## 2020-07-10 DIAGNOSIS — Z86.73 HISTORY OF TRANSIENT ISCHEMIC ATTACK (TIA): ICD-10-CM

## 2020-07-10 DIAGNOSIS — J96.01 ACUTE RESPIRATORY FAILURE WITH HYPOXIA: ICD-10-CM

## 2020-07-10 DIAGNOSIS — M47.812 SPONDYLOSIS OF CERVICAL REGION WITHOUT MYELOPATHY OR RADICULOPATHY: Primary | ICD-10-CM

## 2020-07-10 DIAGNOSIS — M54.2 NECK PAIN: ICD-10-CM

## 2020-07-10 DIAGNOSIS — M19.011 PRIMARY OSTEOARTHRITIS OF BOTH SHOULDERS: ICD-10-CM

## 2020-07-10 DIAGNOSIS — R53.1 GENERALIZED WEAKNESS: ICD-10-CM

## 2020-07-10 DIAGNOSIS — R53.81 PHYSICAL DECONDITIONING: ICD-10-CM

## 2020-07-10 DIAGNOSIS — M05.79 SEROPOSITIVE RHEUMATOID ARTHRITIS OF MULTIPLE SITES: ICD-10-CM

## 2020-07-10 DIAGNOSIS — G24.3 ISOLATED CERVICAL DYSTONIA: ICD-10-CM

## 2020-07-10 DIAGNOSIS — J84.116 CRYPTOGENIC ORGANIZING PNEUMONIA: ICD-10-CM

## 2020-07-10 PROCEDURE — 99214 OFFICE O/P EST MOD 30 MIN: CPT | Mod: 95,,, | Performed by: PHYSICAL MEDICINE & REHABILITATION

## 2020-07-10 PROCEDURE — 99214 OFFICE O/P EST MOD 30 MIN: CPT | Mod: 95,,, | Performed by: FAMILY MEDICINE

## 2020-07-10 PROCEDURE — 99214 PR OFFICE/OUTPT VISIT, EST, LEVL IV, 30-39 MIN: ICD-10-PCS | Mod: 95,,, | Performed by: PHYSICAL MEDICINE & REHABILITATION

## 2020-07-10 PROCEDURE — 99214 PR OFFICE/OUTPT VISIT, EST, LEVL IV, 30-39 MIN: ICD-10-PCS | Mod: 95,,, | Performed by: FAMILY MEDICINE

## 2020-07-10 RX ORDER — BACLOFEN 10 MG/1
5-10 TABLET ORAL 3 TIMES DAILY PRN
Qty: 90 TABLET | Refills: 2 | Status: SHIPPED | OUTPATIENT
Start: 2020-07-10 | End: 2020-10-08 | Stop reason: SDUPTHER

## 2020-07-10 RX ORDER — GABAPENTIN 300 MG/1
300 CAPSULE ORAL NIGHTLY
Qty: 270 CAPSULE | Refills: 2 | Status: SHIPPED | OUTPATIENT
Start: 2020-07-10 | End: 2021-08-17 | Stop reason: SDUPTHER

## 2020-07-10 RX ORDER — PREDNISONE 5 MG/1
10 TABLET ORAL DAILY
COMMUNITY
End: 2020-10-13 | Stop reason: SDUPTHER

## 2020-07-10 NOTE — PROGRESS NOTES
Subjective:       Patient ID: Selma Lux is a 84 y.o. female.    Chief Complaint: No chief complaint on file.    The patient location is:  Home  The chief complaint leading to consultation is:  Follow-up from recent hospitalization and face-to-face for continuing home health orders    Visit type: audiovisual    Face to Face time with patient:  20 min  Twenty-five minutes + of total time spent on the encounter, which includes face to face time and non-face to face time preparing to see the patient (eg, review of tests), Obtaining and/or reviewing separately obtained history, Documenting clinical information in the electronic or other health record, Independently interpreting results (not separately reported) and communicating results to the patient/family/caregiver, or Care coordination (not separately reported).         Each patient to whom he or she provides medical services by telemedicine is:  (1) informed of the relationship between the physician and patient and the respective role of any other health care provider with respect to management of the patient; and (2) notified that he or she may decline to receive medical services by telemedicine and may withdraw from such care at any time.    Notes:  Patient was admitted in April for UTI.  Each time she is admitted she tends to have multiorgan decompensation.  This includes pulmonary, renal, musculoskeletal, cognitive.  She is currently under treatment for COPD and rheumatoid arthritis.  Daughter is convinced that patient needs 10 mg or more of prednisone per day to prevent recurrences or hospitalizations related to exacerbated pulmonary status.  Of course the reason to minimize prednisone dosing would be avoidance of side effects.  Her last hospitalization resulted in transfer to rehab facility and then home.  They wrote her home care orders.    Chart reviewed, including Dr. Pina substitute pulmonary note, Dr. Alexandre for rheumatology.  Patient complains  of neck pain and disability.  She saw Dr. Thomson in physical medicine and rehabilitation but has not had a recent follow-up.  There has been some mix ups with her amlodipine dose with the pharmacy sends us 5 mg refill but she is on 10.  She is also pending a vitamin-D test to determine if she needs high-dose continuing which was prescribed in the hospital.    Previous to her hospitalization, earlier this year, she was doing outpatient physical therapy.  She has historically had a high load of physical therapy visits which seemed to help temporarily.  Our current dilemma is what services are covered, and which are not.  Ideally she would be in an outpatient setting at this time however she is at extreme risk for complications in the event of a COVID infection.  Her daughter was upset recently since her home care orders, written by the hospitalist, had .  We had not performed a face-to-face.  I did recently approved 6 more weeks of home-based physical therapy.  She has pending appointments with neurology and will need an appointment with physical medicine and rehabilitation.    We will need to determine what her benefit for physical therapy actually is.  I explained to daughter that we cannot continue this indefinitely and that a home-based exercise program that they conduct themselves may be necessary following her current intermediate course.  They do not have access to a gym, a public gym would be unsafe.  They live in a single family dwelling which is not have the benefit of exercise or other rehabilitation equipment.    Review of Systems   Constitutional: Negative for activity change and unexpected weight change.   HENT: Negative for hearing loss, rhinorrhea and trouble swallowing.    Eyes: Negative for discharge and visual disturbance.   Respiratory: Negative for chest tightness and wheezing.    Cardiovascular: Negative for chest pain and palpitations.   Gastrointestinal: Negative for blood in stool,  constipation, diarrhea and vomiting.   Endocrine: Negative for polydipsia and polyuria.   Genitourinary: Negative for difficulty urinating, dysuria, hematuria and menstrual problem.   Musculoskeletal: Positive for arthralgias, gait problem, neck pain and neck stiffness. Negative for joint swelling.   Neurological: Positive for weakness and headaches.   Psychiatric/Behavioral: Negative for confusion, decreased concentration and dysphoric mood.       Objective:      Physical Exam  Constitutional:       General: She is not in acute distress.     Appearance: She is well-developed.   Neurological:      Mental Status: She is alert and oriented to person, place, and time.   Psychiatric:         Speech: Speech normal.         Behavior: Behavior normal.         Assessment:       1. Debility    2. Generalized weakness    3. Acute respiratory failure with hypoxia    4. Cryptogenic organizing pneumonia    5. Seropositive rheumatoid arthritis of multiple sites    6. Vascular dementia without behavioral disturbance    7. History of stroke    8. History of transient ischemic attack (TIA)    9. Chronic renal failure syndrome, stage 3 (moderate)    10. AF (paroxysmal atrial fibrillation)    11. Anticoagulant long-term use    12. Immunosuppressed status    13. Cervicalgia        Plan:     Medication List with Changes/Refills   Current Medications    ACETAMINOPHEN (TYLENOL) 325 MG TABLET    Take 2 tablets (650 mg total) by mouth every 6 (six) hours as needed for Pain or Temperature greater than (101).    AMLODIPINE (NORVASC) 10 MG TABLET    Take 1 tablet (10 mg total) by mouth once daily.    APIXABAN (ELIQUIS) 5 MG TAB    Take 1 tablet (5 mg total) by mouth 2 (two) times daily.    ASPIRIN (ECOTRIN) 81 MG EC TABLET    Take 81 mg by mouth once daily.    ATORVASTATIN (LIPITOR) 40 MG TABLET    Take 1 tablet (40 mg total) by mouth once daily.    BACLOFEN (LIORESAL) 10 MG TABLET    Take 10 mg by mouth 3 (three) times daily as needed.     FUROSEMIDE (LASIX) 40 MG TABLET    Take 1 tablet (40 mg total) by mouth once daily. TAKE 1 TABLET(40 MG) BY MOUTH DAILY    HYDROCODONE-ACETAMINOPHEN (NORCO) 5-325 MG PER TABLET    Take 1 tablet by mouth every 8 (eight) hours as needed.    HYDROXYCHLOROQUINE (PLAQUENIL) 200 MG TABLET    Take 1 tablet (200 mg total) by mouth 2 (two) times daily.    MAGNESIUM OXIDE (MAG-OX) 400 MG (241.3 MG MAGNESIUM) TABLET    Take 400 mg by mouth once daily.    MELATONIN (MELATIN) 3 MG TABLET    Take 2 tablets (6 mg total) by mouth nightly as needed for Insomnia.    MONTELUKAST (SINGULAIR) 10 MG TABLET    Take 1 tablet (10 mg total) by mouth every evening.    MYCOPHENOLATE (CELLCEPT) 500 MG TAB    Take 1 tablet (500 mg total) by mouth 2 (two) times daily.    OMEPRAZOLE (PRILOSEC) 20 MG CAPSULE    TAKE 1 CAPSULE DAILY    POLYETHYLENE GLYCOL (GLYCOLAX) 17 GRAM PWPK    Take 17 g by mouth daily as needed.    PREDNISONE (DELTASONE) 5 MG TABLET    Take 10 mg by mouth once daily.    THIAMINE 100 MG TABLET    Take 100 mg by mouth once daily.   Discontinued Medications    APIXABAN (ELIQUIS) 5 MG TAB    Take 1 tablet (5 mg total) by mouth 2 (two) times daily.    PREDNISONE (DELTASONE) 10 MG TABLET    20 mg daily indefinitely until seen by lung doctor    PREDNISONE (DELTASONE) 5 MG TABLET    Begin tapering prednisone. Take 3 tablets  Q AMx 2 weeks , then 2 tablets  Q AMx 2 weeks and then maintenance of one tablet daily.    SULFAMETHOXAZOLE-TRIMETHOPRIM 800-160MG (BACTRIM DS) 800-160 MG TAB    Take 1 tablet by mouth every Mon, Wed, Fri.     Diagnoses and all orders for this visit:    Debility    Generalized weakness  -     Ambulatory referral/consult to Physical Medicine Rehab; Future    Acute respiratory failure with hypoxia  Comments:  resolved    Cryptogenic organizing pneumonia    Seropositive rheumatoid arthritis of multiple sites    Vascular dementia without behavioral disturbance    History of stroke    History of transient ischemic  attack (TIA)    Chronic renal failure syndrome, stage 3 (moderate)    AF (paroxysmal atrial fibrillation)    Anticoagulant long-term use    Immunosuppressed status    Cervicalgia  -     Ambulatory referral/consult to Physical Medicine Rehab; Future      See meds, orders, follow up, routing and instructions sections of encounter and AVS. Discussed with patient and provided on AVS.      Medication list clarified today.  Continue current medications.  Await vitamin-D level.  Follow-up in 3 months.  By virtual.

## 2020-07-10 NOTE — PROGRESS NOTES
Subjective:      Patient ID: Selma Lux is a 84 y.o. female.    Chief Complaint: No chief complaint on file.    The patient location is: [place]  The chief complaint leading to consultation is: follow up    Visit type: audiovisual    Face to Face time with patient: 20 minutes  30  minutes of total time spent on the encounter, which includes face to face time and non-face to face time preparing to see the patient (eg, review of tests), Obtaining and/or reviewing separately obtained history, Documenting clinical information in the electronic or other health record, Independently interpreting results (not separately reported) and communicating results to the patient/family/caregiver, or Care coordination (not separately reported).         Each patient to whom he or she provides medical services by telemedicine is:  (1) informed of the relationship between the physician and patient and the respective role of any other health care provider with respect to management of the patient; and (2) notified that he or she may decline to receive medical services by telemedicine and may withdraw from such care at any time.    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  She was last seen by me on 1/20 and she was having hip pain but she was not interested in more injections.  She went for a left hip injection 11/22/2019.  she was doing better and moving better with botox, and she had repeat 1/13/2020, she is scheduled for repeat in march.    She had trigger point in S[facility] on 1/17/2018.  Today,  She is complaining of shoulder and neck pain.  She feels like it a pulling pain, like something pulling it down.  It can be all of a sudden.  She has been complaining of the neck for the past month. The pain can be severe.  Heat can make it better.  She feels like it can be sudden and can be in any position.  She has been taking the gabapentin, she has  been out of the baclofen. Her daughter did feel like the baclofen helps.  Pain is 5/10 now, worst 10/10, best 0/10 can be gone and suddenly come back.  She is taking gabapentin 100 TID and 300 mg at night.  She is still having hip pain.  She feels like the pain goes from side to side.  She is using the walker.  She is going to restart PT home health.  They do not want to go to Outpatient due to covid    MRI lumbar 3/13/2017  There is S-shaped scoliosis of the thoracolumbar spine, with dextroscoliosis of the thoracolumbar spine and levoscoliosis of the mid lumbar spine. The vertebral body heights are well maintained, with no fracture.  No marrow signal abnormality suspicious for an infiltrative process.  There is multilevel disc space height loss.     The conus is normal in appearance, and terminates at the L1 level.  There is a 2.2 cm perineural root sleeve cyst at the level of S3.      The adjacent soft tissue structures demonstrate presence of a 3.9 cm cyst within the right kidney.  There multiple uterine fibroids present.      There are findings of multi-level lumbar spondylosis, as below.    T12-L1: Left paracentral disc spur complex and bilateral facet arthropathy, resulting in mild spinal canal stenosis.  No significant neuroforaminal narrowing.    L1-L2: Asymmetric left-sided broad based disc bulge and bilateral facet arthropathy, resulting in encroachment of the left lateral recess.  There is no significant spinal canal stenosis, and severe left-sided neuroforaminal narrowing.    L2-L3: Asymmetric right-sided broad-based disc bulge and facet arthropathy resulting in moderate spinal canal stenosis, and moderate/severe bilateral neuroforaminal narrowing.    L3-L4: Broad-based disc bulge and bilateral facet arthropathy resulting in moderate spinal canal stenosis, and moderate/severe  neuroforaminal narrowing.    L4-L5: Broad-based disc bulge and bilateral facet arthropathy, with ligamentous hypertrophy,  resulting in severe spinal canal stenosis and severe right neuroforaminal narrowing.    L5-S1: Broad-based disc bulge and bilateral facet arthropathy, resulting in severe spinal canal stenosis and moderate left-sided neural foraminal narrowing.    Impression         Severe degenerative changes of the lumbar spine, noting severe spinal canal stenosis at L4-5 and L5-S1, as well as moderate spinal canal stenosis at L2-3 and L3-4.  Additional details as above.    S-shaped scoliosis of the thoracolumbar spine.    Perineural root sleeve cyst at the level of  S3.    Multiple uterine fibroids.    Simple right-sided renal cyst.      X-ray hips 3/11/2019  No evidence of acute fracture, dislocation or bone destruction.  Moderate degenerative changes of the hip joints bilaterally left greater than right.  Severe degenerative changes of the lower lumbar spine.  Calcifications in the pelvis likely calcified fibroids.    Impression      Moderate degenerative changes of the hips.  Severe degenerative changes of the visualized portion of the lumbar spine.  No acute bony abnormalities.      MRI cervical 3/2017  There is mild anterolisthesis C2 on C3 and mild retrolisthesis of C3 on C4.  There is also mild retrolisthesis of C6 on C7.  The spondylolisthesis is likely secondary to ligamentous laxity.  There is straightening of normal cervical lordosis. Vertebral body heights are well maintained without evidence of fracture or marrow signal abnormality suspicious for an infiltrative process.  There is loss of intervertebral disc space height at C5-6 and C6-7 Visualized posterior fossa, cervical cord, and upper thoracic cord demonstrate normal signal. Surrounding soft tissue structures demonstrate no significant abnormalities.      C2-3:  Posterior disc osteophyte complex and bilateral facet arthropathy.  Effacement of the anterior thecal sac, without flattening of the spinal cord.  No significant neural foraminal narrowing.     C3-4:   Posterior disc osteophyte complex and bilateral facet arthropathy, resulting in effacement of the anterior thecal sac, without flattening of the spinal cord.  There is moderate left-sided neuroforaminal narrowing.       C4-5:  Posterior disc osteophyte complex and bilateral facet arthropathy, resulting in effacement of the anterior thecal sac, without flattening of the spinal cord.  There is no significant neural foraminal narrowing.     C5-6:  Posterior disc osteophyte complex and bilateral facet arthropathy, resulting in effacement of the anterior thecal sac, without flattening of spinal cord.  No significant neural foraminal narrowing.     C6-7:  Posterior disc osteophyte complex and bilateral facet arthropathy, resulting in effacement of the anterior thecal sac, without flattening of the spinal cord.  No significant neural foraminal narrowing.     C7-T1:  No significant spinal canal stenosis.  No significant neural foraminal narrowing.  Impression         There is multilevel degenerative changes of the cervical spine, resulting in mild spinal canal stenosis and neuroforaminal narrowing as detailed above.      X-ray cervical  AP, neutral lateral, flexion lateral, and extension lateral radiographs of the cervical spine were obtained.  Note that the cervicothoracic junction is not optimally visualized.  There is 2 mm anterolisthesis of C2 on C3 which increases to 3 mm on the flexion radiographs.  The remainder of the posterior vertebral alignment is satisfactory.  Vertebral body heights are well-maintained.  There are advanced degenerative changes identified throughout the cervical spine with disc space narrowing and anterior and posterior osteophyte formation.  There is solid bony fusion of the C5 and C6 vertebral bodies.  There is mild facet arthropathy noted throughout the cervical spine.  Atherosclerotic calcification is identified in the region of the carotid arteries bilaterally.  Impression         2 mm  anterolisthesis of C2 on C3 which appears to be degenerative in etiology.  This increases to 3 mm on the flexion radiograph of the cervical spine.  Cervical spondylosis.  Atherosclerosis.    X-ray left shoulder 2015  Left shoulder: Significant acromiohumeral interval narrowing, this can be associated with a rotator cuff tear.  The humeral head demonstrate normal contour.  No osseous lesions, sclerosis or significant osteophyte formation.  The acromioclavicular joint   appears within normal limits.  The soft tissues appear normal.    Past Medical History:  No date: Anemia  No date: Arthritis  No date: Hashimoto's disease  No date: Hypertension  1/12/2016: IGT (impaired glucose tolerance)  No date: Joint pain  1/12/2016: Multinodular goiter    Past Surgical History:  No date: CATARACT EXTRACTION  9/29/2015: COLONOSCOPY N/A      Comment: Procedure: COLONOSCOPY;  Surgeon: FIDELINA Sanchez MD;  Location: [facility] ([address] [place] [address]);                 Service: Endoscopy;  Laterality: N/A;  No date: JOINT REPLACEMENT      Comment: right knee  12/31/2013: KNEE SURGERY Left      Comment: TKR  No date: left parotidectomy      Comment: mixed tumor  No date: SALIVARY GLAND SURGERY  No date: TONSILLECTOMY    Review of patient's family history indicates:    Hypertension                   Mother                    Prostate cancer                Father                      Comment: prostate cancer    Breast cancer                  Maternal Grandmother      Lupus                          Neg Hx                    Psoriasis                      Neg Hx                    Melanoma                       Neg Hx                    Colon cancer                   Neg Hx                      Social History    Marital status:              Spouse name:                       Years of education:                 Number of children:               Social History Main Topics    Smoking status: Never Smoker                                                                 Smokeless tobacco: Never Used                        Alcohol use: No                 Comment: very seldom     Drug use: No              Sexual activity: No                      Comment: ,  age 52,         Current Outpatient Prescriptions:  amlodipine (NORVASC) 5 MG tablet, TAKE 1 TABLET DAILY, Disp: 90 tablet, Rfl: 2  baclofen (LIORESAL) 10 MG tablet, Take 1 tablet (10 mg total) by mouth 3 (three) times daily., Disp: 270 tablet, Rfl: 2  clopidogrel (PLAVIX) 75 mg tablet, Take 1 tablet (75 mg total) by mouth once daily., Disp: 90 tablet, Rfl: 1  deutetrabenazine (AUSTEDO) 9 mg Tab, Take 2 tablets by mouth 2 (two) times daily. Final titrating dose - needs initially titration pack from company, Disp: 120 tablet, Rfl: 11  diazePAM (VALIUM) 2 MG tablet, Take 1 tablet (2 mg total) by mouth every evening., Disp: 30 tablet, Rfl: 3  ferrous sulfate 325 (65 FE) MG EC tablet, Take 325 mg by mouth once daily. , Disp: , Rfl:   gabapentin (NEURONTIN) 300 MG capsule, Take 1-2 capsules (300-600 mg total) by mouth 3 (three) times daily., Disp: 540 capsule, Rfl: 1  hydrocodone-acetaminophen 5-325mg (NORCO) 5-325 mg per tablet, Take 1 tablet by mouth every 24 hours as needed for Pain., Disp: 30 tablet, Rfl: 0  hydroxychloroquine (PLAQUENIL) 200 mg tablet, TAKE 2 TABLETS ONCE DAILY, Disp: 180 tablet, Rfl: 1  linaclotide (LINZESS) 145 mcg Cap capsule, Take 1 capsule (145 mcg total) by mouth once daily., Disp: 30 capsule, Rfl: 0  memantine (NAMENDA XR) 7-14-21-28 mg C24k, Follow titration instructions 7 mg x1. 14 mg x1 week. 21 mg x1 week. 28 mg x1 week., Disp: 1 each, Rfl: 0  memantine 28 mg CSpX, Take 1 capsule (28 mg total) by mouth once daily. START after titration pack completed., Disp: 30 capsule, Rfl: 3  montelukast (SINGULAIR) 10 mg tablet, TAKE 1 TABLET EVERY EVENING, Disp: 90 tablet, Rfl: 2  omega 3-dha-epa-fish oil 250-350-1,000 mg Cap, Take 1 capsule by mouth 2 (two) times daily.,  Disp: 180 capsule, Rfl: 3  predniSONE (DELTASONE) 5 MG tablet, TAKE 2 TABLETS ONCE DAILY, Disp: 180 tablet, Rfl: 0  sertraline (ZOLOFT) 25 MG tablet, Take 1 tablet (25 mg total) by mouth once daily., Disp: 90 tablet, Rfl: 1  thiamine 100 MG tablet, Take 1 tablet (100 mg total) by mouth once daily., Disp: , Rfl:     No current facility-administered medications for this visit.       Review of patient's allergies indicates:  No Known Allergies        Review of Systems   Constitution: Negative for weight gain and weight loss.   Cardiovascular: Positive for dyspnea on exertion. Negative for chest pain.   Respiratory: Negative for shortness of breath.    Musculoskeletal: Positive for joint pain (bilateral shoulder ), myalgias and neck pain. Negative for back pain and joint swelling.   Gastrointestinal: Negative for abdominal pain, bowel incontinence, nausea and vomiting.   Genitourinary: Negative for bladder incontinence and urgency.   Neurological: Negative for numbness and paresthesias.         Objective:        General: Selma is well-developed, well-nourished, appears stated age, in no acute distress, alert and oriented to time, place and person.     General    Constitutional: She is oriented to person, place, and time. She appears well-developed and well-nourished.   HENT:   Head: Normocephalic.   Pulmonary/Chest: Effort normal.   Neurological: She is alert and oriented to person, place, and time.   Psychiatric: She has a normal mood and affect. Her behavior is normal. Judgment and thought content normal.           virtual visit            Assessment:       1. Spondylosis of cervical region without myelopathy or radiculopathy    2. Neck pain    3. Muscle spasm    4. Hemichorea    5. Isolated cervical dystonia    6. Physical deconditioning    7. Primary osteoarthritis of both shoulders           Plan:       Orders Placed This Encounter    baclofen (LIORESAL) 10 MG tablet    gabapentin (NEURONTIN) 300 MG capsule      1.  She is having more pain in the left neck today, but she has not had botox since January.  She is scheduled for July.  She still has the hip pain.    2. Restart HH and therapy.  She was back in hospital.  She does not feel ready for outpatient PT currently  3.  Baclofen 5-10mg po TID, she will restart.  Her daughter is going to monitor the gabapentin and baclofen together and will dacrease gabapentin if she is too sleepy   4.  gabapentin to 300 mg po QHs andgabapentin 100- TID during the day.    5.  She is using a walker  6.   IR for left hip joint injection for hip DJD did not help.  She is not interested in trying another injection.  If has another injection she does not want to have it here  7.  She does not need a refill of pain meds.  She has some if needed.  Her daughter gives it to her sparingling  8.  RTC 3 months      Follow-up: No follow-ups on file.  If there are any questions prior to this, the patient was instructed to contact the office.

## 2020-07-14 ENCOUNTER — CARE AT HOME (OUTPATIENT)
Dept: HOME HEALTH SERVICES | Facility: CLINIC | Age: 85
End: 2020-07-14
Attending: FAMILY MEDICINE
Payer: MEDICARE

## 2020-07-14 VITALS
BODY MASS INDEX: 29.19 KG/M2 | TEMPERATURE: 98 F | RESPIRATION RATE: 18 BRPM | SYSTOLIC BLOOD PRESSURE: 122 MMHG | DIASTOLIC BLOOD PRESSURE: 68 MMHG | HEIGHT: 64 IN | WEIGHT: 171 LBS | HEART RATE: 85 BPM | OXYGEN SATURATION: 98 %

## 2020-07-14 DIAGNOSIS — Z86.73 HISTORY OF STROKE: ICD-10-CM

## 2020-07-14 DIAGNOSIS — J84.116 CRYPTOGENIC ORGANIZING PNEUMONIA: ICD-10-CM

## 2020-07-14 DIAGNOSIS — M05.79 SEROPOSITIVE RHEUMATOID ARTHRITIS OF MULTIPLE SITES: ICD-10-CM

## 2020-07-14 PROCEDURE — 99497 ADVNCD CARE PLAN 30 MIN: CPT | Mod: S$GLB,,, | Performed by: NURSE PRACTITIONER

## 2020-07-14 PROCEDURE — 99350 PR HOME VISIT,ESTAB PATIENT,LEVEL IV: ICD-10-PCS | Mod: S$GLB,,, | Performed by: NURSE PRACTITIONER

## 2020-07-14 PROCEDURE — 99350 HOME/RES VST EST HIGH MDM 60: CPT | Mod: S$GLB,,, | Performed by: NURSE PRACTITIONER

## 2020-07-14 PROCEDURE — 99497 PR ADVNCD CARE PLAN 30 MIN: ICD-10-PCS | Mod: S$GLB,,, | Performed by: NURSE PRACTITIONER

## 2020-07-14 RX ORDER — MONTELUKAST SODIUM 10 MG/1
10 TABLET ORAL NIGHTLY
Qty: 30 TABLET | Refills: 2 | Status: SHIPPED | OUTPATIENT
Start: 2020-07-14 | End: 2020-10-12

## 2020-07-15 ENCOUNTER — TELEPHONE (OUTPATIENT)
Dept: INTERNAL MEDICINE | Facility: CLINIC | Age: 85
End: 2020-07-15

## 2020-07-15 NOTE — TELEPHONE ENCOUNTER
Contact: Kinsey (PT) [phone]  Kinsey state the patient insurance is only covering 8 visit. Please call and advise.    RAMONITA

## 2020-07-15 NOTE — TELEPHONE ENCOUNTER
Kinsey [facility] - discussed.  Her benefit only allows 8 more visits.  She is generally working on thing she knows how to do.  They will work on her motivation and daughters participation for ongoing care after physical therapy stops.

## 2020-07-15 NOTE — PROGRESS NOTES
[facility]  Medical Home Visit    Visit Date: 7/15/2020  Encounter Provider: Carola Saldivar NP  PCP:  Bhargav Hirsch MD    Subjective:      Patient ID: Selma Lux is a 84 y.o. female.    Consult Requested By:  Dr. Bhargav Hirsch  Reason for Consult:  Hospital Follow up    Selma is being seen at home due to  physical debility that presents a taxing effort to leave the home, to mitigate high risk of hospital readmission or due to the limited availability of reliable or safe options for transportation to the point of access to the provider. Prior to treatment on this visit the chart was reviewed and patient consent was obtained.  .      Chief Complaint: Establish Care, Physical Deconditioning    HPI Selma Lux is an 84 year old female with a past medical history of Hashimoto's disease, HFrEF, COPD, Cryptogenic organizing pneumonia on chronic prednisone, RA on plaquenil and cellcept, HTN, HPLD, TIA, vascular dementia, pulmonary HTN secondary to ILD, Long QT interval , Multinodular goiter, Arthritis, Anemia and Physical Deconditioning. Past surgical history that includes Salivary gland surgery; Tonsillectomy; left parotidectomy; Cataract extraction; Colonoscopy (N/A, 9/29/2015); Joint replacement; Knee surgery (Left, 12/31/2013); Eye surgery; and Breast cyst excision.      Today she is being seen at home to establish care due to recent debility. 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 She is ambulatory with [facility], lives in [place] with her daughter who is her primary caretaker. She has complaints of neck pain that has worsened over the past couple of years. She is treated by Dr. Thomson and receives botox injections, she reports her last visit was 07/10/2020 and continues to take her gabapentin in which she explains provides adequate relief.    She denies chest pains, denies fever or shortness of breath, reports she has followed  all CDC guidelines including social distancing and wearing face covering when out of the home. VSS, all medications reviewed.    I initiated the process of advance care planning today and explained the importance of this process to the patient and family.  I introduced the concept of advance directives to the patient, as well. Then the patient received detailed information about the importance of designating a Health Care Power of  (HCPOA). She was also instructed to communicate with this person about their wishes for future healthcare, should she/he become sick and lose decision-making capacity. We discussed ACP for 30 minutes.    Attestation: Screening criteria to assess the level of the patient's risk for infection with COVID-19 as recommended by the CDC at the time of the above documented home visit concluded appropriateness to proceed. Universal precautions were maintained at all times, including provider use of 60% alcohol gel hand  immediately prior to entry and upon departing the patient's home.    Review of Systems  Constitutional: Negative for activity change and unexpected weight change.   HENT: Negative for hearing loss, rhinorrhea and trouble swallowing.    Eyes: Negative for discharge and visual disturbance.   Respiratory: Negative for chest tightness and wheezing.    Cardiovascular: Negative for chest pain and palpitations.   Gastrointestinal: Negative for blood in stool, constipation, diarrhea and vomiting.   Endocrine: Negative for polydipsia and polyuria.   Genitourinary: Negative for difficulty urinating, dysuria, hematuria and menstrual problem.   Musculoskeletal: Positive for arthralgias, gait problem, neck pain and neck stiffness. Negative for joint swelling.   Neurological: Positive for weakness and headaches.   Psychiatric/Behavioral: Negative for confusion, decreased concentration and dysphoric mood.    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     Appearance: She is not diaphoretic.   HENT:      Head: Normocephalic.      Right Ear: Tympanic membrane normal.      Nose: Nose normal.      Mouth/Throat:      Mouth: Mucous membranes are moist.   Eyes:      Pupils: Pupils are equal, round, and reactive to light.   Neck:      Musculoskeletal: Normal range of motion.   Cardiovascular:      Rate and Rhythm: Normal rate.   Pulmonary:      Effort: Pulmonary effort is normal.   Abdominal:      General: Abdomen is flat. Bowel sounds are normal.      Palpations: Abdomen is soft.   Musculoskeletal:      Right lower leg: Edema present.      Left lower leg: Edema present.   Skin:     General: Skin is warm and dry.      Capillary Refill: Capillary refill takes less than 2 seconds.   Neurological:      General: No focal deficit present.      Mental Status: She is alert and oriented to person, place, and time.   Psychiatric:         Mood and Affect: Mood normal.         Vitals:    07/14/20 1630   BP: 122/68   Pulse: 85   Resp: 18   Temp: 97.5 °F (36.4 °C)   TempSrc: Skin   SpO2: 98%   Weight: 77.6 kg (171 lb)   Height: 5' 4" (1.626 m)   PainSc:   2     Body mass index is 29.35 kg/m².    Assessment:   Selma was seen today for establish care.    Diagnoses and all orders for this visit:    Generalized weakness  Multiple falls at home  - Continue Home PT/OT       HFrEF  TTE with EF 55% and grade 1 DD, normal CVP     HTN  -Continue home meds     RA  Continue home meds  Follow up with Dr. Weston     HLD  - continue statin      COPD  -     montelukast (SINGULAIR) 10 mg tablet; Take 1 tablet (10 mg total) by mouth every evening.        Plan:     Ethical / Legal: Advance " Care Planning   · Surrogate decision maker:  Name Rosy Rosado, Relationship: Daughter  · Code Status:  Full code  · [place]POST:  Left in home  · Other advance directive:  n/a, Capacity to make medical decisions:  Yes, Conflict No       Selma was seen today for establish care.    Diagnoses and all orders for this visit:    Generalized weakness  Multiple falls at home  - Continue Home PT/OT       HFrEF  TTE with EF 55% and grade 1 DD, normal CVP     HTN  -Continue home meds     RA  Continue home meds  Follow up with Dr. Weston     HLD  - continue statin      COPD  -     montelukast (SINGULAIR) 10 mg tablet; Take 1 tablet (10 mg total) by mouth every evening.        Were controlled substances prescribed?  No    Follow Up Appointments:   Future Appointments   Date Time Provider Department Center   7/29/2020 11:20 AM Baldomero Giang MD [facility] NEURO [place] [facility]   10/19/2020  3:00 PM Dee Thomson MD [facility]   10/28/2020  9:00 AM [facility] USOP1 [facility]   11/9/2020  1:30 PM Rashad Monroy MD [facility] ENDO8 [facility]       Signature:  Carola Saldivar, NP    Patient consent obtained prior to treatment at this visit.

## 2020-07-17 DIAGNOSIS — Z71.89 COMPLEX CARE COORDINATION: ICD-10-CM

## 2020-07-20 NOTE — PATIENT INSTRUCTIONS
Instructions:  1. Take all medications as prescribed  2. Keep all follow-up appointments  3. Return to the hospital or call your primary care physicians if any worsening symptoms such as fever, chest pain, shortness of breath, return of symptoms, or any other concerns.  4. Home visit in 8 weeks

## 2020-07-23 ENCOUNTER — EXTERNAL HOME HEALTH (OUTPATIENT)
Dept: HOME HEALTH SERVICES | Facility: HOSPITAL | Age: 85
End: 2020-07-23
Payer: MEDICARE

## 2020-07-29 ENCOUNTER — PROCEDURE VISIT (OUTPATIENT)
Dept: NEUROLOGY | Facility: CLINIC | Age: 85
End: 2020-07-29
Payer: MEDICARE

## 2020-07-29 VITALS
HEIGHT: 64 IN | SYSTOLIC BLOOD PRESSURE: 125 MMHG | BODY MASS INDEX: 31.05 KG/M2 | WEIGHT: 181.88 LBS | DIASTOLIC BLOOD PRESSURE: 68 MMHG | HEART RATE: 95 BPM

## 2020-07-29 DIAGNOSIS — G24.3 CERVICAL DYSTONIA: ICD-10-CM

## 2020-07-29 PROCEDURE — 64616 PR CHEMODENERVATION NECK MUSCLES EXC LARNYNX, UNI: ICD-10-PCS | Mod: 50,S$PBB,, | Performed by: PSYCHIATRY & NEUROLOGY

## 2020-07-29 PROCEDURE — 64616 CHEMODENERV MUSC NECK DYSTON: CPT | Mod: 50,S$PBB,, | Performed by: PSYCHIATRY & NEUROLOGY

## 2020-07-29 PROCEDURE — 64616 CHEMODENERV MUSC NECK DYSTON: CPT | Mod: 50,PBBFAC | Performed by: PSYCHIATRY & NEUROLOGY

## 2020-07-29 RX ADMIN — ONABOTULINUMTOXINA 200 UNITS: 100 INJECTION, POWDER, LYOPHILIZED, FOR SOLUTION INTRADERMAL; INTRAMUSCULAR at 11:07

## 2020-07-29 NOTE — PROCEDURES
Procedures  +   BOTULINUM TOXIN PROCEDURE NOTE FOR CERVICAL DYSTONIA/ SPASMODIC TORTICOLLIS    Diagnosis: CERVICAL DYSTONIA/ SPASMODIC TORTICOLLIS  She has recurrence of pain, spasm and abnormal posturing over the past several weeks. She is experiencing a worsening of cervical dystonia/ spasmodic torticollis which interferes with activities of daily living (ADL).     Meds are working pretty well.  Will inject again today.    Assessment and Plan:   Selma Lux is a 84 y.o. female with dystonic neck postures.  Given the focal nature of increased tone and poor response to oral medications, She is a good candidate for botulinum injection to her neck.  She will need 200 units of toxin    The goal of the injections will be to reduce pain and abnormal posturing.  The procedure was explained to patient and a consent form was signed.      BOTOX INJECTION PROCEDURE:    Using a 30 gauge injection needle and aseptic technique the following muscles were identified and injected with botox .     Dilution: 100:1      For cervical dystonia/ spasmodic torticollis:      RIGHT    Muscle group Units given # injection sites   Splenius capitus      Levator scapulae     Upper trapezius     Sternocleidomastoid      Scalenus anticus      Scalenus medius     Scalenus posterior     Longissimus capitus      rhomboid     Semispinalis 25                   LEFT        Muscle group Units given # injection sites   Splenius capitus  25    Levator scapulae     Upper trapezius 25 - 25    Sternocleidomastoid      Scalenus anticus      Scalenus medius     Scalenus posterior     Longissimus capitus      rhomboid     Semispinalis 25            Total amount of botox used:  125 units  Total amount of botox wasted:  75 units    Patient tolerated the procedure without any difficulty and there were no complications.

## 2020-07-31 ENCOUNTER — EXTERNAL CHRONIC CARE MANAGEMENT (OUTPATIENT)
Dept: PRIMARY CARE CLINIC | Facility: CLINIC | Age: 85
End: 2020-07-31
Payer: MEDICARE

## 2020-07-31 PROCEDURE — 99490 PR CHRONIC CARE MGMT, 1ST 20 MIN: ICD-10-PCS | Mod: S$PBB,,, | Performed by: FAMILY MEDICINE

## 2020-07-31 PROCEDURE — 99490 CHRNC CARE MGMT STAFF 1ST 20: CPT | Mod: PBBFAC | Performed by: FAMILY MEDICINE

## 2020-07-31 PROCEDURE — 99490 CHRNC CARE MGMT STAFF 1ST 20: CPT | Mod: S$PBB,,, | Performed by: FAMILY MEDICINE

## 2020-08-05 ENCOUNTER — PATIENT OUTREACH (OUTPATIENT)
Dept: ADMINISTRATIVE | Facility: OTHER | Age: 85
End: 2020-08-05

## 2020-08-07 ENCOUNTER — OFFICE VISIT (OUTPATIENT)
Dept: DERMATOLOGY | Facility: CLINIC | Age: 85
End: 2020-08-07
Payer: MEDICARE

## 2020-08-07 VITALS — TEMPERATURE: 98 F

## 2020-08-07 DIAGNOSIS — L60.3 ONYCHODYSTROPHY: ICD-10-CM

## 2020-08-07 DIAGNOSIS — B37.2 CANDIDIASIS OF NAIL: Primary | ICD-10-CM

## 2020-08-07 DIAGNOSIS — J84.116 CRYPTOGENIC ORGANIZING PNEUMONIA: ICD-10-CM

## 2020-08-07 PROCEDURE — 99214 PR OFFICE/OUTPT VISIT, EST, LEVL IV, 30-39 MIN: ICD-10-PCS | Mod: S$GLB,,, | Performed by: DERMATOLOGY

## 2020-08-07 PROCEDURE — 99214 OFFICE O/P EST MOD 30 MIN: CPT | Mod: S$GLB,,, | Performed by: DERMATOLOGY

## 2020-08-07 RX ORDER — MYCOPHENOLATE MOFETIL 500 MG/1
500 TABLET ORAL 2 TIMES DAILY
Qty: 180 TABLET | Refills: 0 | Status: CANCELLED | OUTPATIENT
Start: 2020-08-07 | End: 2020-11-05

## 2020-08-07 RX ORDER — FLUCONAZOLE 200 MG/1
TABLET ORAL
Qty: 12 TABLET | Refills: 1 | Status: SHIPPED | OUTPATIENT
Start: 2020-08-07 | End: 2020-09-18 | Stop reason: ALTCHOICE

## 2020-08-07 NOTE — PROGRESS NOTES
Subjective:       Patient ID:  Selma Lux is a 84 y.o. female who presents for   Chief Complaint   Patient presents with    Nail Problem     5 nails now affected     Nail Problem - Follow-up  Diagnosis: previous nail culture grew C. albicans.  Symptom course: worsening  Affected locations: right fingers and left fingers  Duration: years.  Signs and Symptoms: discoloration, thickening, brittleness; spreading to other nails.  Severity: moderate  Timing: constant  Treatments tried: tried and failed ciclopirox 8% soln.  Improvement on treatment: no relief      Review of Systems   Skin: Negative for itching and rash.   Hematologic/Lymphatic: Bruises/bleeds easily.        Objective:    Physical Exam   Constitutional: She appears well-developed and well-nourished. No distress.   HENT:   Mouth/Throat: Lips normal.    Eyes: Lids are normal.  No conjunctival no injection.   Neurological: She is alert and oriented to person, place, and time. She is not disoriented.   Psychiatric: She has a normal mood and affect.   Skin:   Areas Examined (abnormalities noted in diagram):   RUE Inspected  LUE Inspection Performed  Nails and Digits Inspection Performed             Diagram Legend     Erythematous scaling macule/papule c/w actinic keratosis       Vascular papule c/w angioma      Pigmented verrucoid papule/plaque c/w seborrheic keratosis      Yellow umbilicated papule c/w sebaceous hyperplasia      Irregularly shaped tan macule c/w lentigo     1-2 mm smooth white papules consistent with Milia      Movable subcutaneous cyst with punctum c/w epidermal inclusion cyst      Subcutaneous movable cyst c/w pilar cyst      Firm pink to brown papule c/w dermatofibroma      Pedunculated fleshy papule(s) c/w skin tag(s)      Evenly pigmented macule c/w junctional nevus     Mildly variegated pigmented, slightly irregular-bordered macule c/w mildly atypical nevus      Flesh colored to evenly pigmented papule c/w intradermal nevus        Pink pearly papule/plaque c/w basal cell carcinoma      Erythematous hyperkeratotic cursted plaque c/w SCC      Surgical scar with no sign of skin cancer recurrence      Open and closed comedones      Inflammatory papules and pustules      Verrucoid papule consistent consistent with wart     Erythematous eczematous patches and plaques     Dystrophic onycholytic nail with subungual debris c/w onychomycosis     Umbilicated papule    Erythematous-base heme-crusted tan verrucoid plaque consistent with inflamed seborrheic keratosis     Erythematous Silvery Scaling Plaque c/w Psoriasis     See annotation      Assessment / Plan:        Candidiasis of nail  Onychodystrophy  Tried and failed topical antifungals. Will treat Candida based on previous culture.  -     fluconazole (DIFLUCAN) 200 MG Tab; Take 1 tab PO qweek.  Dispense: 12 tablet; Refill: 1  Discussed benefits, and side effects of fluconazole, including but not limited to headache, GI symptoms (N/V, diarrhea) and rarely rash or abnormal liver function tests. Discussed that side effects are minimized by taking the medication only once per week.  Discussed that fluconazole can increase the effect of atorvastatin, so decreasing her dose to 20 mg daily will minimize risk of toxicity which can present as muscle pain or weakness.    Follow up in about 3 months (around 11/7/2020) for follow up, or sooner if symptoms worsening or not improving.

## 2020-08-18 ENCOUNTER — OFFICE VISIT (OUTPATIENT)
Dept: NEUROLOGY | Facility: CLINIC | Age: 85
End: 2020-08-18
Payer: MEDICARE

## 2020-08-18 DIAGNOSIS — F01.50 VASCULAR DEMENTIA WITHOUT BEHAVIORAL DISTURBANCE: Primary | ICD-10-CM

## 2020-08-18 PROCEDURE — 99213 PR OFFICE/OUTPT VISIT, EST, LEVL III, 20-29 MIN: ICD-10-PCS | Mod: 95,,, | Performed by: PSYCHIATRY & NEUROLOGY

## 2020-08-18 PROCEDURE — 99213 OFFICE O/P EST LOW 20 MIN: CPT | Mod: 95,,, | Performed by: PSYCHIATRY & NEUROLOGY

## 2020-08-18 NOTE — PROGRESS NOTES
Subjective:       Patient ID: Selma Lux is a 84 y.o. female.    Chief Complaint:  No chief complaint on file.      History of Present Illness  Chief complaint left neck pain. Doing well regarding memory. No significant change. Patient states emotionally flat.  States she is improved over the last 6 months.  Per daughter, memory is better.     Vascular Dementia.    Review of Systems  Review of Systems   Musculoskeletal: Positive for arthralgias, neck pain and neck stiffness.       Objective:      Neurologic Exam     Mental Status   Oriented to person, place, and time.   Registration: recalls 3 of 3 objects. Recall at 5 minutes: recalls 2 of 3 objects.   Attention: normal. Concentration: normal.   Speech: speech is normal   Level of consciousness: alert  Knowledge: good.   Able to name object. Able to read. Able to repeat. Able to write. Normal comprehension.       Physical Exam  Constitutional:       Appearance: Normal appearance.   HENT:      Head: Normocephalic and atraumatic.      Nose: Nose normal.   Pulmonary:      Effort: Pulmonary effort is normal.   Neurological:      Mental Status: She is alert and oriented to person, place, and time.   Psychiatric:         Speech: Speech normal.           Assessment:     Problem List Items Addressed This Visit     None          Plan:       Follow up in about 1 week (around 8/25/2020).

## 2020-08-31 ENCOUNTER — EXTERNAL CHRONIC CARE MANAGEMENT (OUTPATIENT)
Dept: PRIMARY CARE CLINIC | Facility: CLINIC | Age: 85
End: 2020-08-31
Payer: MEDICARE

## 2020-08-31 PROCEDURE — 99490 CHRNC CARE MGMT STAFF 1ST 20: CPT | Mod: S$PBB,,, | Performed by: FAMILY MEDICINE

## 2020-08-31 PROCEDURE — 99490 PR CHRONIC CARE MGMT, 1ST 20 MIN: ICD-10-PCS | Mod: S$PBB,,, | Performed by: FAMILY MEDICINE

## 2020-08-31 PROCEDURE — 99490 CHRNC CARE MGMT STAFF 1ST 20: CPT | Mod: PBBFAC | Performed by: FAMILY MEDICINE

## 2020-09-10 ENCOUNTER — CARE AT HOME (OUTPATIENT)
Dept: HOME HEALTH SERVICES | Facility: CLINIC | Age: 85
End: 2020-09-10
Payer: MEDICARE

## 2020-09-10 DIAGNOSIS — Z86.73 HISTORY OF STROKE: ICD-10-CM

## 2020-09-10 PROCEDURE — 99497 PR ADVNCD CARE PLAN 30 MIN: ICD-10-PCS | Mod: 25,S$GLB,, | Performed by: NURSE PRACTITIONER

## 2020-09-10 PROCEDURE — 99350 PR HOME VISIT,ESTAB PATIENT,LEVEL IV: ICD-10-PCS | Mod: S$GLB,,, | Performed by: NURSE PRACTITIONER

## 2020-09-10 PROCEDURE — 99350 HOME/RES VST EST HIGH MDM 60: CPT | Mod: S$GLB,,, | Performed by: NURSE PRACTITIONER

## 2020-09-10 PROCEDURE — 99497 ADVNCD CARE PLAN 30 MIN: CPT | Mod: 25,S$GLB,, | Performed by: NURSE PRACTITIONER

## 2020-09-15 VITALS
BODY MASS INDEX: 30.9 KG/M2 | HEIGHT: 64 IN | DIASTOLIC BLOOD PRESSURE: 69 MMHG | HEART RATE: 92 BPM | WEIGHT: 181 LBS | TEMPERATURE: 98 F | OXYGEN SATURATION: 96 % | RESPIRATION RATE: 18 BRPM | SYSTOLIC BLOOD PRESSURE: 118 MMHG

## 2020-09-15 NOTE — PROGRESS NOTES
[facility]  Medical Home Visit    Visit Date: 9/10/2020  Encounter Provider: Carola Saldivar, NP  PCP:  Bhargav Hirsch MD    Subjective:      Patient ID: Selma Lux is a 84 y.o. female.    Consult Requested By:  Carola Saldivar  Reason for Consult:  Hospital Follow up    Selma is being seen at home due to  physical debility that presents a taxing effort to leave the home, to mitigate high risk of hospital readmission or due to the limited availability of reliable or safe options for transportation to the point of access to the provider. Prior to treatment on this visit the chart was reviewed and patient consent was obtained.  .      Chief Complaint: Establish Care, Physical Deconditioning    HPI Selma Lux is an 84 year old female with a past medical history of Hashimoto's disease, HFrEF, COPD, Cryptogenic organizing pneumonia on chronic prednisone, RA on plaquenil and cellcept, HTN, HPLD, TIA, vascular dementia, pulmonary HTN secondary to ILD, Long QT interval , Multinodular goiter, Arthritis, Anemia and Physical Deconditioning. Past surgical history that includes Salivary gland surgery; Tonsillectomy; left parotidectomy; Cataract extraction; Colonoscopy (N/A, 9/29/2015); Joint replacement; Knee surgery (Left, 12/31/2013); Eye surgery; and Breast cyst excision.      Today she is being seen at home for a follow up due to recent debility. 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 She is ambulatory with [facility], lives in [place] with her daughter who is her primary caretaker and is present for the visit. She has complaints of neck pain that has worsened over the past couple of years. She is treated by Dr. Thomson and receives botox injections, she reports her last visit was 07/10/2020 and continues to take her gabapentin in which she explains provides some relief, she is scheduled to follow up with Dr. Thomson 10/19/2020. She reports her  HH PT was helping but has recently stopped. I will continue home PT until she follows up with Dr. Thomson.      She denies chest pains, denies fever or shortness of breath, reports she has followed all CDC guidelines including social distancing and wearing face covering when out of the home. VSS, all medications reviewed.    I reiterated the process of advance care planning today and explained the importance of this process to the patient and family.  I introduced the concept of advance directives to the patient, as well. Then the patient received detailed information about the importance of designating a Health Care Power of  (HCPOA). She was also instructed to communicate with this person about their wishes for future healthcare, should she/he become sick and lose decision-making capacity. We discussed ACP for 30 minutes.    Attestation: Screening criteria to assess the level of the patient's risk for infection with COVID-19 as recommended by the CDC at the time of the above documented home visit concluded appropriateness to proceed. Universal precautions were maintained at all times, including provider use of 60% alcohol gel hand  immediately prior to entry and upon departing the patient's home.    Review of Systems  Constitutional: Negative for activity change and unexpected weight change.   HENT: Negative for hearing loss, rhinorrhea and trouble swallowing.    Eyes: Negative for discharge and visual disturbance.   Respiratory: Negative for chest tightness and wheezing.    Cardiovascular: Negative for chest pain and palpitations.   Gastrointestinal: Negative for blood in stool, constipation, diarrhea and vomiting.   Endocrine: Negative for polydipsia and polyuria.   Genitourinary: Negative for difficulty urinating, dysuria, hematuria and menstrual problem.   Musculoskeletal: Positive for arthralgias, gait problem, neck pain and neck stiffness. Negative for joint swelling.   Neurological:  "Positive for weakness and headaches.   Psychiatric/Behavioral: Negative for confusion, decreased concentration and dysphoric mood.    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     Appearance: She is not diaphoretic.   HENT:      Head: Normocephalic.      Right Ear: Tympanic membrane normal.      Nose: Nose normal.      Mouth/Throat:      Mouth: Mucous membranes are moist.   Eyes:      Pupils: Pupils are equal, round, and reactive to light.   Neck:      Musculoskeletal: Normal range of motion.   Cardiovascular:      Rate and Rhythm: Normal rate.   Pulmonary:      Effort: Pulmonary effort is normal.   Abdominal:      General: Abdomen is flat. Bowel sounds are normal.      Palpations: Abdomen is soft.   Musculoskeletal:      Right lower leg: Edema present.      Left lower leg: Edema present.   Skin:     General: Skin is warm and dry.      Capillary Refill: Capillary refill takes less than 2 seconds.   Neurological:      General: No focal deficit present.      Mental Status: She is alert and oriented to person, place, and time.   Psychiatric:         Mood and Affect: Mood normal.         Vitals:    09/10/20 1422   BP: 118/69   Pulse: 92   Resp: 18   Temp: 98 °F (36.7 °C)   SpO2: 96%   Weight: 82.1 kg (181 lb)   Height: 5' 4" (1.626 m)   PainSc:   2     Body mass index is 31.07 kg/m².    Assessment:   Selma was seen today for establish care.    Diagnoses and all orders for this visit:    Generalized weakness  Multiple falls at home  - Continue Home PT/OT       HFrEF  TTE with EF 55% and grade 1 DD, normal CVP     HTN  -Continue home meds     RA  Continue home meds  Follow up with Dr." Trista     HLD  - continue statin      COPD  -     montelukast (SINGULAIR) 10 mg tablet; Take 1 tablet (10 mg total) by mouth every evening.        Plan:     Ethical / Legal: Advance Care Planning   · Surrogate decision maker:  Kinga Rosado, Relationship: Daughter  · Code Status:  Full code  · [place]POST:  Left in home  · Other advance directive:  n/a, Capacity to make medical decisions:  Yes, Conflict No       Selma was seen today for establish care.    Diagnoses and all orders for this visit:    Generalized weakness  Multiple falls at home  - Continue Home PT/OT       HFrEF  TTE with EF 55% and grade 1 DD, normal CVP     HTN  -Continue home meds     RA  Continue home meds  Follow up with Dr. Trista EDEND  - continue statin      COPD  -     montelukast (SINGULAIR) 10 mg tablet; Take 1 tablet (10 mg total) by mouth every evening.        Were controlled substances prescribed?  No    Follow Up Appointments:   Future Appointments   Date Time Provider Department Center   10/19/2020  3:00 PM [facility]   10/28/2020  9:00 AM [facility] USOP1 [facility]   11/9/2020 10:00 AM Teri Ceballos MD [facility] DERM [facility]   11/9/2020  1:30 PM Rashad Monroy MD [facility] ENDO8 [facility]       Signature:  Carola Saldivar NP    Patient consent obtained prior to treatment at this visit.

## 2020-09-18 ENCOUNTER — PATIENT MESSAGE (OUTPATIENT)
Dept: DERMATOLOGY | Facility: CLINIC | Age: 85
End: 2020-09-18

## 2020-09-18 ENCOUNTER — PATIENT MESSAGE (OUTPATIENT)
Dept: INTERNAL MEDICINE | Facility: CLINIC | Age: 85
End: 2020-09-18

## 2020-09-18 DIAGNOSIS — B37.2 CANDIDIASIS OF NAIL: Primary | ICD-10-CM

## 2020-09-18 RX ORDER — CIPROFLOXACIN 500 MG/1
500 TABLET ORAL EVERY 12 HOURS
Qty: 6 TABLET | Refills: 0 | Status: SHIPPED | OUTPATIENT
Start: 2020-09-18 | End: 2020-09-21

## 2020-09-18 RX ORDER — AMLODIPINE BESYLATE 10 MG/1
10 TABLET ORAL DAILY
Qty: 90 TABLET | Refills: 0 | Status: SHIPPED | OUTPATIENT
Start: 2020-09-18 | End: 2020-12-22 | Stop reason: SDUPTHER

## 2020-09-18 NOTE — TELEPHONE ENCOUNTER
Care Due:                  Date            Visit Type   Department     Provider  --------------------------------------------------------------------------------                                [place] JOHN      [facility] INTERNAL  Last Visit: 07-      VISIT        MEDICINE       [facility] NICOLE  Next Visit: None Scheduled  None         None Found                                                            Last  Test          Frequency    Reason                     Performed    Due Date  --------------------------------------------------------------------------------    HDL.........  12 months..  atorvastatin.............  08- 08-    LDL.........  12 months..  atorvastatin.............  08- 08-    Total         12 months..  atorvastatin.............  08- 08-  Cholesterol.    Triglyceride  12 months..  atorvastatin.............  08- 08-  s...........    Powered by [employer]. Reference number: [id number]. 9/18/2020 2:06:16 PM CDT

## 2020-09-18 NOTE — TELEPHONE ENCOUNTER
My mother is requesting a prescription for a course of Bactrim, which she was given while hospitalized for a urinary tract infection.    Please let me know if you need any additional information.  Many thanks,  Susu

## 2020-09-18 NOTE — TELEPHONE ENCOUNTER
Spoke with daughter Susu.  I would prefer not to use Bactrim.  Concerns of KERMIT.  Not currently taking Diflucan.  Mild symptoms, no fever, no nausea, no flank or abdominal pain reported.  Three days ciprofloxacin notify me if not improving.

## 2020-09-20 NOTE — PROGRESS NOTES
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 Hasn't seen endocrine since around 2015 or so.    With regards to the thyroid nodule:  The thyroid nodule was found on Imaging. MRI many years ago.    Also has hx hashimotos, TPO in 2007 was about 1400 per prior notes. Last lab here, 2011, TPO was 428. Hasn't needed thyroid medication, TSH has been normal for the most part.   Last TSH:   Lab Results   Component Value Date    TSH 0.895 08/26/2019     Last ultrasound: 8/2015   - Very large right lobe. Has a nodule, was 3.5x3.24x3.25 cm in size, isoechoic with cystic degeneration   - left lobe nodule 1x0.7x1cm.  Has multiple neck images since then, last 8/2019 which noted enlarged right thyroid, substernal extension, tracheal deviation.    Had seen endocrine as well as endocrine surgery in the past, was considering surgery (last seen Dr. Wild 5/2016). Had symptoms of dysphagia, hoarseness. Had surgery planned for late 2015, but had life issues come up and postponed surgery. Was rescheduled, due for surgery around June 2016. But then hosptializations, strokes, multiple other issues came up. Hasn't had thyroid surgery.    FNA done? 2006, benign per prior notes.    Patient denies:   Denies trouble swallowing for the most part. +voice change (but for a while), especially singing. Denies choking sensation/trouble breathing when laying flat. No FH thyroid cancer (mother with hyperthyroid though).   No FH of thyroid cancer, personal history of radiation exposure, skin/hair changes, diarrhea/constipation and palpitations.    Patient reports:   Feeling sick off and on last few years.  Has hx RA, in 2018 developed pulmonary disease, was in ICU/intubated for a while, then rehab. December 2018, developed viral encephalopathy back to ICU/rehab/etc, needed steroids, but as steroids tapered in early 2019, her lungs got worse again,  back to hospital. Also had a few TIAs. Also has hx prior strokes on brain imaging. Developed some sultana-chorea. Finished last time in rehab in October. Gaining some weight over the last year since feeling better. Feels cold a lot.    Reviewed past medical, family, social history and updated as appropriate.    Review of Systems   Constitutional: Positive for unexpected weight change (gained weight since feeling better (but lost a lot of weight while sick)).   HENT: Positive for voice change. Negative for trouble swallowing.    Eyes: Positive for visual disturbance (slight decreased vision lately).   Respiratory: Positive for shortness of breath (with exertion).    Cardiovascular: Negative for palpitations.   Gastrointestinal: Negative for constipation and diarrhea.   Endocrine: Positive for cold intolerance. Negative for heat intolerance.   Genitourinary: Negative for frequency.   Musculoskeletal: Positive for back pain.        Left hip, other places as well   Neurological: Negative for light-headedness.     Objective:     Vitals:    11/13/19 1120   BP: (!) 115/57   Pulse: 76     BP Readings from Last 5 Encounters:   11/13/19 (!) 115/57   10/17/19 128/66   10/17/19 (!) 114/53   09/17/19 (!) 142/74   09/05/19 128/60     Physical Exam   Constitutional: She appears well-developed and well-nourished.   HENT:   Head: Normocephalic and atraumatic.   Eyes: EOM are normal.   Neck: Normal range of motion. Neck supple. Thyromegaly (nodule on right. no bruit) present.   Cardiovascular: Normal rate and regular rhythm.   No murmur heard.  Pulmonary/Chest: Effort normal and breath sounds normal.   Abdominal: Soft. She exhibits no distension and no mass. There is no tenderness.   Musculoskeletal: Normal range of motion. She exhibits edema (1+ edema). She exhibits no tenderness.   Neurological: She is alert.   Psychiatric: She has a normal mood and affect. Judgment normal.   Vitals reviewed.    Wt Readings from Last 5 Encounters:    11/13/19 1120 78.5 kg (173 lb 1 oz)   10/17/19 1603 78.5 kg (173 lb)   10/17/19 1529 78.7 kg (173 lb 8 oz)   09/17/19 1102 82.1 kg (181 lb)   08/27/19 0659 80.3 kg (177 lb)   08/26/19 2248 80.6 kg (177 lb 11.1 oz)     Lab Results   Component Value Date    HGBA1C 5.6 08/27/2019    HGBA1C 5.7 (H) 03/13/2019    HGBA1C 5.5 08/24/2017    HGBA1C 5.6 01/12/2016     Lab Results   Component Value Date    CHOL 197 08/26/2019    CHOL 166 05/10/2017    HDL 90 (H) 08/26/2019    HDL 76 (H) 05/10/2017    LDLCALC 91.6 08/26/2019    LDLCALC 77.8 05/10/2017    TRIG 77 08/26/2019    TRIG 52 04/16/2018    CHOLHDL 45.7 08/26/2019    CHOLHDL 45.8 05/10/2017     Lab Results   Component Value Date     09/05/2019    K 4.4 09/05/2019     09/05/2019    CO2 26 09/05/2019    GLU 74 09/05/2019    BUN 22 09/05/2019    CREATININE 1.1 09/05/2019    CALCIUM 9.3 09/05/2019    PROT 6.4 08/29/2019    ALBUMIN 3.7 08/29/2019    BILITOT 0.9 08/29/2019    ALKPHOS 48 (L) 08/29/2019    AST 16 08/29/2019    ALT 10 08/29/2019    ANIONGAP 9 09/05/2019    ESTGFRAFRICA 54.4 (A) 09/05/2019    EGFRNONAA 47.2 (A) 09/05/2019    TSH 0.895 08/26/2019      Assessment/Plan:     Multinodular goiter  Pt with large right sided nodule, compressive symptoms   - had surgery evaluation before, cancelled surgery 2x. Had a lot of other things come up, multiple hospitalizations, rehab/recovery time, etc.   - last US a few years ago   - on other neck imaging, still with some deviation   - clinically, pt with some symptoms - voice change. But otherwise denies choking sensation, difficulty swallowing.   - repeat US, see if FNA needed   - offered referral back to ENT or endocrine surgery. Pt prefers not to go for a surgery at this time. Since I'm considering FNA, I did discuss with patient if she would be interested in a surgery if hypothetically the nodule was thyroid cancer. She would consider in that case, so it is reasonable to evaluate for FNA with  ultrasound      Thyroid antibody positive  Has elevated TPO in the past    - last TSH normal   - clinically, biochemically euthyroid   - monitor TSH yearly        Follow up in about 1 year (around 11/13/2020). or sooner if needed.   RLE swelling, warmth, redness with healed ulcer

## 2020-09-21 ENCOUNTER — PATIENT MESSAGE (OUTPATIENT)
Dept: DERMATOLOGY | Facility: CLINIC | Age: 85
End: 2020-09-21

## 2020-09-21 RX ORDER — EFINACONAZOLE 100 MG/ML
SOLUTION TOPICAL
Qty: 4 ML | Refills: 3 | Status: SHIPPED | OUTPATIENT
Start: 2020-09-21 | End: 2020-10-29

## 2020-09-30 ENCOUNTER — EXTERNAL CHRONIC CARE MANAGEMENT (OUTPATIENT)
Dept: PRIMARY CARE CLINIC | Facility: CLINIC | Age: 85
End: 2020-09-30
Payer: MEDICARE

## 2020-09-30 PROCEDURE — 99490 CHRNC CARE MGMT STAFF 1ST 20: CPT | Mod: S$PBB,,, | Performed by: FAMILY MEDICINE

## 2020-09-30 PROCEDURE — 99490 PR CHRONIC CARE MGMT, 1ST 20 MIN: ICD-10-PCS | Mod: S$PBB,,, | Performed by: FAMILY MEDICINE

## 2020-09-30 PROCEDURE — 99490 CHRNC CARE MGMT STAFF 1ST 20: CPT | Mod: PBBFAC | Performed by: FAMILY MEDICINE

## 2020-10-08 ENCOUNTER — PATIENT MESSAGE (OUTPATIENT)
Dept: NEUROLOGY | Facility: CLINIC | Age: 85
End: 2020-10-08

## 2020-10-08 ENCOUNTER — PATIENT MESSAGE (OUTPATIENT)
Dept: DERMATOLOGY | Facility: CLINIC | Age: 85
End: 2020-10-08

## 2020-10-08 DIAGNOSIS — B37.2 CANDIDIASIS OF NAIL: Primary | ICD-10-CM

## 2020-10-09 ENCOUNTER — PATIENT MESSAGE (OUTPATIENT)
Dept: NEUROLOGY | Facility: CLINIC | Age: 85
End: 2020-10-09

## 2020-10-12 ENCOUNTER — PATIENT MESSAGE (OUTPATIENT)
Dept: RHEUMATOLOGY | Facility: CLINIC | Age: 85
End: 2020-10-12

## 2020-10-13 ENCOUNTER — PATIENT MESSAGE (OUTPATIENT)
Dept: DERMATOLOGY | Facility: CLINIC | Age: 85
End: 2020-10-13

## 2020-10-13 RX ORDER — CICLOPIROX 80 MG/ML
SOLUTION TOPICAL
Qty: 1 BOTTLE | Refills: 5 | Status: ON HOLD | OUTPATIENT
Start: 2020-10-13 | End: 2022-01-31 | Stop reason: HOSPADM

## 2020-10-14 ENCOUNTER — PATIENT MESSAGE (OUTPATIENT)
Dept: RHEUMATOLOGY | Facility: CLINIC | Age: 85
End: 2020-10-14

## 2020-10-14 ENCOUNTER — PATIENT MESSAGE (OUTPATIENT)
Dept: INTERNAL MEDICINE | Facility: CLINIC | Age: 85
End: 2020-10-14

## 2020-10-14 RX ORDER — PREDNISONE 5 MG/1
10 TABLET ORAL DAILY
Qty: 60 TABLET | Refills: 2 | Status: SHIPPED | OUTPATIENT
Start: 2020-10-14 | End: 2021-01-08 | Stop reason: SDUPTHER

## 2020-10-14 NOTE — TELEPHONE ENCOUNTER
From: Selma Lux     Sent: 10/14/2020  3:00 PM CDT       To: Bhargav Lee MD  Subject: Prescription    Dear Dr Lee,  I've been trying to reach Dr Rouse to get a refill of her prednisone prescription, but have not received a reply. We have run out today. Are able to write a 14-day prescription for her today? Her dosage is 10 mg per day.

## 2020-10-16 ENCOUNTER — PATIENT OUTREACH (OUTPATIENT)
Dept: ADMINISTRATIVE | Facility: OTHER | Age: 85
End: 2020-10-16

## 2020-10-16 NOTE — PROGRESS NOTES
Health Maintenance Due   Topic Date Due    Pneumococcal Vaccine (65+ High/Highest Risk) (2 of 2 - PPSV23) 05/02/2018    Influenza Vaccine (1) 08/01/2020     Updates were requested from care everywhere.  Chart was reviewed for overdue Proactive [facility] Encounters ([facility]) topics (CRS, Breast Cancer Screening, Eye exam)  Health Maintenance has been updated.  [facility] immunization registry triggered.  Immunizations were reconciled.

## 2020-10-19 ENCOUNTER — OFFICE VISIT (OUTPATIENT)
Dept: SPINE | Facility: CLINIC | Age: 85
End: 2020-10-19
Attending: PHYSICAL MEDICINE & REHABILITATION
Payer: MEDICARE

## 2020-10-19 VITALS
SYSTOLIC BLOOD PRESSURE: 114 MMHG | HEIGHT: 64 IN | HEART RATE: 91 BPM | DIASTOLIC BLOOD PRESSURE: 61 MMHG | WEIGHT: 181 LBS | BODY MASS INDEX: 30.9 KG/M2

## 2020-10-19 DIAGNOSIS — M16.12 PRIMARY OSTEOARTHRITIS OF LEFT HIP: Primary | ICD-10-CM

## 2020-10-19 DIAGNOSIS — M47.816 SPONDYLOSIS OF LUMBAR REGION WITHOUT MYELOPATHY OR RADICULOPATHY: ICD-10-CM

## 2020-10-19 PROCEDURE — 99999 PR PBB SHADOW E&M-EST. PATIENT-LVL IV: ICD-10-PCS | Mod: PBBFAC,,, | Performed by: PHYSICAL MEDICINE & REHABILITATION

## 2020-10-19 PROCEDURE — 99214 OFFICE O/P EST MOD 30 MIN: CPT | Mod: PBBFAC | Performed by: PHYSICAL MEDICINE & REHABILITATION

## 2020-10-19 PROCEDURE — 99214 PR OFFICE/OUTPT VISIT, EST, LEVL IV, 30-39 MIN: ICD-10-PCS | Mod: S$PBB,,, | Performed by: PHYSICAL MEDICINE & REHABILITATION

## 2020-10-19 PROCEDURE — 99214 OFFICE O/P EST MOD 30 MIN: CPT | Mod: S$PBB,,, | Performed by: PHYSICAL MEDICINE & REHABILITATION

## 2020-10-19 PROCEDURE — 99999 PR PBB SHADOW E&M-EST. PATIENT-LVL IV: CPT | Mod: PBBFAC,,, | Performed by: PHYSICAL MEDICINE & REHABILITATION

## 2020-10-19 RX ORDER — TRAMADOL HYDROCHLORIDE 50 MG/1
50 TABLET ORAL EVERY 6 HOURS PRN
Qty: 90 TABLET | Refills: 0 | Status: SHIPPED | OUTPATIENT
Start: 2020-10-19 | End: 2021-03-01 | Stop reason: SDUPTHER

## 2020-10-19 NOTE — PROGRESS NOTES
Subjective:      Patient ID: Selma Lux is a 85 y.o. female.    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  She was last seen by me on 7/10/20 virtually and she was having neck and shoulder pain, she had Home health coming.  She previously went for a left hip injection 11/22/2019 which did not help.  she was doing better and moving better with botox, and she had repeat 1/13/2020, and 7/29/2020, she is scheduled to follow up with neurology.    She had trigger point in SCM on 1/17/2018.  Today,  She is still having neck pain.  The botox did not work.  Today, she is having more hip pain, The hip pain is on both sides.  She tried baclofen 10mg po TID and she was too sleepy.  She has been going to outpatient therapy.  [facility]  She has been using walker until a week ago.  She is using a cane now.  She feels the pain in the butt.  She does not have pain now.  She feels like it is tender too touch.  She feels like the pain is more with standing and walking.      MRI lumbar 3/13/2017  There is S-shaped scoliosis of the thoracolumbar spine, with dextroscoliosis of the thoracolumbar spine and levoscoliosis of the mid lumbar spine. The vertebral body heights are well maintained, with no fracture.  No marrow signal abnormality suspicious for an infiltrative process.  There is multilevel disc space height loss.     The conus is normal in appearance, and terminates at the L1 level.  There is a 2.2 cm perineural root sleeve cyst at the level of S3.      The adjacent soft tissue structures demonstrate presence of a 3.9 cm cyst within the right kidney.  There multiple uterine fibroids present.      There are findings of multi-level lumbar spondylosis, as below.    T12-L1: Left paracentral disc spur complex and bilateral facet arthropathy, resulting in mild spinal canal stenosis.  No  significant neuroforaminal narrowing.    L1-L2: Asymmetric left-sided broad based disc bulge and bilateral facet arthropathy, resulting in encroachment of the left lateral recess.  There is no significant spinal canal stenosis, and severe left-sided neuroforaminal narrowing.    L2-L3: Asymmetric right-sided broad-based disc bulge and facet arthropathy resulting in moderate spinal canal stenosis, and moderate/severe bilateral neuroforaminal narrowing.    L3-L4: Broad-based disc bulge and bilateral facet arthropathy resulting in moderate spinal canal stenosis, and moderate/severe  neuroforaminal narrowing.    L4-L5: Broad-based disc bulge and bilateral facet arthropathy, with ligamentous hypertrophy, resulting in severe spinal canal stenosis and severe right neuroforaminal narrowing.    L5-S1: Broad-based disc bulge and bilateral facet arthropathy, resulting in severe spinal canal stenosis and moderate left-sided neural foraminal narrowing.    Impression         Severe degenerative changes of the lumbar spine, noting severe spinal canal stenosis at L4-5 and L5-S1, as well as moderate spinal canal stenosis at L2-3 and L3-4.  Additional details as above.    S-shaped scoliosis of the thoracolumbar spine.    Perineural root sleeve cyst at the level of  S3.    Multiple uterine fibroids.    Simple right-sided renal cyst.      X-ray hips 3/11/2019  No evidence of acute fracture, dislocation or bone destruction.  Moderate degenerative changes of the hip joints bilaterally left greater than right.  Severe degenerative changes of the lower lumbar spine.  Calcifications in the pelvis likely calcified fibroids.    Impression      Moderate degenerative changes of the hips.  Severe degenerative changes of the visualized portion of the lumbar spine.  No acute bony abnormalities.      MRI cervical 3/2017  There is mild anterolisthesis C2 on C3 and mild retrolisthesis of C3 on C4.  There is also mild retrolisthesis of C6 on C7.  The  spondylolisthesis is likely secondary to ligamentous laxity.  There is straightening of normal cervical lordosis. Vertebral body heights are well maintained without evidence of fracture or marrow signal abnormality suspicious for an infiltrative process.  There is loss of intervertebral disc space height at C5-6 and C6-7 Visualized posterior fossa, cervical cord, and upper thoracic cord demonstrate normal signal. Surrounding soft tissue structures demonstrate no significant abnormalities.      C2-3:  Posterior disc osteophyte complex and bilateral facet arthropathy.  Effacement of the anterior thecal sac, without flattening of the spinal cord.  No significant neural foraminal narrowing.     C3-4:  Posterior disc osteophyte complex and bilateral facet arthropathy, resulting in effacement of the anterior thecal sac, without flattening of the spinal cord.  There is moderate left-sided neuroforaminal narrowing.       C4-5:  Posterior disc osteophyte complex and bilateral facet arthropathy, resulting in effacement of the anterior thecal sac, without flattening of the spinal cord.  There is no significant neural foraminal narrowing.     C5-6:  Posterior disc osteophyte complex and bilateral facet arthropathy, resulting in effacement of the anterior thecal sac, without flattening of spinal cord.  No significant neural foraminal narrowing.     C6-7:  Posterior disc osteophyte complex and bilateral facet arthropathy, resulting in effacement of the anterior thecal sac, without flattening of the spinal cord.  No significant neural foraminal narrowing.     C7-T1:  No significant spinal canal stenosis.  No significant neural foraminal narrowing.  Impression         There is multilevel degenerative changes of the cervical spine, resulting in mild spinal canal stenosis and neuroforaminal narrowing as detailed above.      X-ray cervical  AP, neutral lateral, flexion lateral, and extension lateral radiographs of the cervical spine  were obtained.  Note that the cervicothoracic junction is not optimally visualized.  There is 2 mm anterolisthesis of C2 on C3 which increases to 3 mm on the flexion radiographs.  The remainder of the posterior vertebral alignment is satisfactory.  Vertebral body heights are well-maintained.  There are advanced degenerative changes identified throughout the cervical spine with disc space narrowing and anterior and posterior osteophyte formation.  There is solid bony fusion of the C5 and C6 vertebral bodies.  There is mild facet arthropathy noted throughout the cervical spine.  Atherosclerotic calcification is identified in the region of the carotid arteries bilaterally.  Impression         2 mm anterolisthesis of C2 on C3 which appears to be degenerative in etiology.  This increases to 3 mm on the flexion radiograph of the cervical spine.  Cervical spondylosis.  Atherosclerosis.    X-ray left shoulder 2015  Left shoulder: Significant acromiohumeral interval narrowing, this can be associated with a rotator cuff tear.  The humeral head demonstrate normal contour.  No osseous lesions, sclerosis or significant osteophyte formation.  The acromioclavicular joint   appears within normal limits.  The soft tissues appear normal.    Past Medical History:  No date: Anemia  No date: Arthritis  No date: Hashimoto's disease  No date: Hypertension  1/12/2016: IGT (impaired glucose tolerance)  No date: Joint pain  1/12/2016: Multinodular goiter    Past Surgical History:  No date: CATARACT EXTRACTION  9/29/2015: COLONOSCOPY N/A      Comment: Procedure: COLONOSCOPY;  Surgeon: FIDELINA Sanchez MD;  Location: [address]);                 Service: Endoscopy;  Laterality: N/A;  No date: JOINT REPLACEMENT      Comment: right knee  12/31/2013: KNEE SURGERY Left      Comment: TKR  No date: left parotidectomy      Comment: mixed tumor  No date: SALIVARY GLAND SURGERY  No date: TONSILLECTOMY    Review of patient's  family history indicates:    Hypertension                   Mother                    Prostate cancer                Father                      Comment: prostate cancer    Breast cancer                  Maternal Grandmother      Lupus                          Neg Hx                    Psoriasis                      Neg Hx                    Melanoma                       Neg Hx                    Colon cancer                   Neg Hx                      Social History    Marital status:              Spouse name:                       Years of education:                 Number of children:               Social History Main Topics    Smoking status: Never Smoker                                                                Smokeless tobacco: Never Used                        Alcohol use: No                 Comment: very seldom     Drug use: No              Sexual activity: No                      Comment: ,  age 52,         Current Outpatient Prescriptions:  amlodipine (NORVASC) 5 MG tablet, TAKE 1 TABLET DAILY, Disp: 90 tablet, Rfl: 2  baclofen (LIORESAL) 10 MG tablet, Take 1 tablet (10 mg total) by mouth 3 (three) times daily., Disp: 270 tablet, Rfl: 2  clopidogrel (PLAVIX) 75 mg tablet, Take 1 tablet (75 mg total) by mouth once daily., Disp: 90 tablet, Rfl: 1  deutetrabenazine (AUSTEDO) 9 mg Tab, Take 2 tablets by mouth 2 (two) times daily. Final titrating dose - needs initially titration pack from company, Disp: 120 tablet, Rfl: 11  diazePAM (VALIUM) 2 MG tablet, Take 1 tablet (2 mg total) by mouth every evening., Disp: 30 tablet, Rfl: 3  ferrous sulfate 325 (65 FE) MG EC tablet, Take 325 mg by mouth once daily. , Disp: , Rfl:   gabapentin (NEURONTIN) 300 MG capsule, Take 1-2 capsules (300-600 mg total) by mouth 3 (three) times daily., Disp: 540 capsule, Rfl: 1  hydrocodone-acetaminophen 5-325mg (NORCO) 5-325 mg per tablet, Take 1 tablet by mouth every 24 hours as needed for Pain., Disp: 30  tablet, Rfl: 0  hydroxychloroquine (PLAQUENIL) 200 mg tablet, TAKE 2 TABLETS ONCE DAILY, Disp: 180 tablet, Rfl: 1  linaclotide (LINZESS) 145 mcg Cap capsule, Take 1 capsule (145 mcg total) by mouth once daily., Disp: 30 capsule, Rfl: 0  memantine (NAMENDA XR) 7-14-21-28 mg C24k, Follow titration instructions 7 mg x1. 14 mg x1 week. 21 mg x1 week. 28 mg x1 week., Disp: 1 each, Rfl: 0  memantine 28 mg CSpX, Take 1 capsule (28 mg total) by mouth once daily. START after titration pack completed., Disp: 30 capsule, Rfl: 3  montelukast (SINGULAIR) 10 mg tablet, TAKE 1 TABLET EVERY EVENING, Disp: 90 tablet, Rfl: 2  omega 3-dha-epa-fish oil 250-350-1,000 mg Cap, Take 1 capsule by mouth 2 (two) times daily., Disp: 180 capsule, Rfl: 3  predniSONE (DELTASONE) 5 MG tablet, TAKE 2 TABLETS ONCE DAILY, Disp: 180 tablet, Rfl: 0  sertraline (ZOLOFT) 25 MG tablet, Take 1 tablet (25 mg total) by mouth once daily., Disp: 90 tablet, Rfl: 1  thiamine 100 MG tablet, Take 1 tablet (100 mg total) by mouth once daily., Disp: , Rfl:     No current facility-administered medications for this visit.       Review of patient's allergies indicates:  No Known Allergies        Review of Systems   Constitution: Negative for weight gain and weight loss.   Cardiovascular: Positive for dyspnea on exertion. Negative for chest pain.   Respiratory: Negative for shortness of breath.    Musculoskeletal: Positive for joint pain (bilateral shoulder ), myalgias and neck pain. Negative for back pain and joint swelling.   Gastrointestinal: Negative for abdominal pain, bowel incontinence, nausea and vomiting.   Genitourinary: Negative for bladder incontinence and urgency.   Neurological: Negative for numbness and paresthesias.         Objective:        General: Selma is well-developed, well-nourished, appears stated age, in no acute distress, alert and oriented to time, place and person.     General    Vitals reviewed.  Constitutional: She is oriented to person,  place, and time. She appears well-developed and well-nourished.   HENT:   Head: Normocephalic and atraumatic.   Pulmonary/Chest: Effort normal.   Neurological: She is alert and oriented to person, place, and time.   Psychiatric: She has a normal mood and affect. Her behavior is normal. Judgment and thought content normal.     General Musculoskeletal Exam   Gait: abnormal (with a walker narrow base)     Back (L-Spine & T-Spine) / Neck (C-Spine) Exam     Tenderness Left paramedian tenderness of the Sacrum.     Back (L-Spine & T-Spine) Range of Motion   Extension: 0   Flexion: 60     Spinal Sensation   Right Side Sensation  C-Spine Level: normal   L-Spine Level: normal  S-Spine Level: normal  Left Side Sensation  C-Spine Level: normal  L-Spine Level: normal  S-Spine Level: normal    Back (L-Spine & T-Spine) Tests   Right Side Tests  Straight leg raise:      Sitting SLR: > 70 degrees      Left Side Tests  Straight leg raise:     Sitting SLR: > 70 degrees          Other She has no scoliosis .  Spinal Kyphosis:  Absent  Left Hand/Wrist Exam     Inspection   Deformity: Hand -  present (left thumb)      Right Shoulder Exam     Range of Motion   Active abduction: 100     Tests & Signs   Rotator Cuff Painful Arc/Range: moderate    Left Shoulder Exam     Range of Motion   Active abduction: 100     Tests & Signs   Rotator Cuff Painful Arc/Range: moderate      Muscle Strength   Right Upper Extremity   Biceps: 5/5   Deltoid:  5/5  Triceps:  5/5  Wrist extension: 5/5   : 4/5   Finger Flexors:  5/5  Left Upper Extremity  Biceps: 5/5   Deltoid:  5/5  Triceps:  5/5  Wrist extension: 5/5   :  4/5   Finger Flexors:  5/5  Right Lower Extremity   Hip Flexion: 5/5   Quadriceps:  5/5   Anterior tibial:  5/5   EHL:  5/5  Left Lower Extremity   Hip Flexion: 5/5   Quadriceps:  5/5   Anterior tibial:  5/5   EHL:  5/5    Reflexes     Left Side  Biceps:  2+  Triceps:  2+  Brachioradialis:  2+  Achilles:  2+  Left Davis's Sign:   Absent  Babinski Sign:  absent  Quadriceps:  2+    Right Side   Biceps:  2+  Triceps:  2+  Brachioradialis:  2+  Achilles:  2+  Right Davis's Sign:  absent  Babinski Sign:  absent  Quadriceps:  2+    Vascular Exam     Right Pulses        Carotid:                  2+    Left Pulses        Carotid:                  2+              Assessment:       1. Primary osteoarthritis of left hip    2. Spondylosis of lumbar region without myelopathy or radiculopathy           Plan:       Orders Placed This Encounter    Ambulatory referral/consult to Pain Clinic    traMADoL (ULTRAM) 50 mg tablet     1.  She is having more pain hip pain left greater than right, she ahd hip injection with no relief.  She was told no hip surgery.  Pain is weight bearing  2. Continue  outpatient PT currently at [facility]  3.  Baclofen 5-10mg po TID, she will restart.  Her daughter is going to monitor the gabapentin and baclofen together and will dacrease gabapentin if she is too sleepy   4.  gabapentin to 300 mg po QHs andgabapentin 100- TID during the day.    5.  She is using a cane  6.  Return to pain management to consider nerve burn for hip vs facet injections for back.  She would like to see someone new  7.  refill of pain meds.  She has some if needed.  Her daughter gives it to her sparingly  8. She did not feel like botox helped the neck in July.  She is going to follow up with neurology  9.  We discussed thumb and going to hand clinic.  She might need a splint with varus deformity  10 tramadol as needed, she does not use it often.   reviewed  11.  RTC 3 months      Follow-up: Follow up in about 3 months (around 1/19/2021).  If there are any questions prior to this, the patient was instructed to contact the office.

## 2020-10-22 ENCOUNTER — CARE AT HOME (OUTPATIENT)
Dept: HOME HEALTH SERVICES | Facility: CLINIC | Age: 85
End: 2020-10-22
Payer: MEDICARE

## 2020-10-22 VITALS
HEIGHT: 64 IN | TEMPERATURE: 98 F | RESPIRATION RATE: 18 BRPM | DIASTOLIC BLOOD PRESSURE: 60 MMHG | WEIGHT: 181 LBS | HEART RATE: 85 BPM | BODY MASS INDEX: 30.9 KG/M2 | SYSTOLIC BLOOD PRESSURE: 124 MMHG | OXYGEN SATURATION: 98 %

## 2020-10-22 DIAGNOSIS — Z86.73 HISTORY OF STROKE: ICD-10-CM

## 2020-10-22 PROCEDURE — 99350 HOME/RES VST EST HIGH MDM 60: CPT | Mod: S$GLB,,, | Performed by: NURSE PRACTITIONER

## 2020-10-22 PROCEDURE — 99350 PR HOME VISIT,ESTAB PATIENT,LEVEL IV: ICD-10-PCS | Mod: S$GLB,,, | Performed by: NURSE PRACTITIONER

## 2020-10-22 NOTE — PROGRESS NOTES
[facility]  Medical Home Visit    Visit Date: 9/10/2020  Encounter Provider: Carola Saldivar, NP  PCP:  Bhargav Hirsch MD    Subjective:      Patient ID: Selma Lux is a 85 y.o. female.    Consult Requested By:  Carola Saldivar  Reason for Consult:  Hospital Follow up    Selma is being seen at home due to  physical debility that presents a taxing effort to leave the home, to mitigate high risk of hospital readmission or due to the limited availability of reliable or safe options for transportation to the point of access to the provider. Prior to treatment on this visit the chart was reviewed and patient consent was obtained.  .      Chief Complaint: Establish Care, Physical Deconditioning    Follow-up     Selma Lux is an 84 year old female with a past medical history of Hashimoto's disease, HFrEF, COPD, Cryptogenic organizing pneumonia on chronic prednisone, RA on plaquenil and cellcept, HTN, HPLD, TIA, vascular dementia, pulmonary HTN secondary to ILD, Long QT interval , Multinodular goiter, Arthritis, Anemia and Physical Deconditioning. Past surgical history that includes Salivary gland surgery; Tonsillectomy; left parotidectomy; Cataract extraction; Colonoscopy (N/A, 9/29/2015); Joint replacement; Knee surgery (Left, 12/31/2013); Eye surgery; and Breast cyst excision.      Today she is being seen at home for a follow up due to recent debility. 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 She is ambulatory with [facility], lives in [place] with her daughter who is her primary caretaker and is present for the visit. She has complaints of back pain that has worsened over the past couple of years. She is treated by Dr. Thomson and receives botox injections, she reports her last visit was 09/2020 and continues to take her gabapentin in which she explains provides some relief, she is scheduled to follow up with Dr. Thomson 10/19/2020. She  reports her HH PT was helping but has recently stopped. I will continue home PT until she follows up with Dr. Thomson.      She denies chest pains, denies fever or shortness of breath, reports she has followed all CDC guidelines including social distancing and wearing face covering when out of the home. VSS, all medications reviewed.    Attestation: Screening criteria to assess the level of the patient's risk for infection with COVID-19 as recommended by the CDC at the time of the above documented home visit concluded appropriateness to proceed. Universal precautions were maintained at all times, including provider use of 60% alcohol gel hand  immediately prior to entry and upon departing the patient's home.    Review of Systems  Constitutional: Negative for activity change and unexpected weight change.   HENT: Negative for hearing loss, rhinorrhea and trouble swallowing.    Eyes: Negative for discharge and visual disturbance.   Respiratory: Negative for chest tightness and wheezing.    Cardiovascular: Negative for chest pain and palpitations.   Gastrointestinal: Negative for blood in stool, constipation, diarrhea and vomiting.   Endocrine: Negative for polydipsia and polyuria.   Genitourinary: Negative for difficulty urinating, dysuria, hematuria and menstrual problem.   Musculoskeletal: Positive for arthralgias, gait problem, neck pain and neck stiffness. Negative for joint swelling.   Neurological: Positive for weakness and headaches.   Psychiatric/Behavioral: Negative for confusion, decreased concentration and dysphoric mood.    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     Appearance: She is not diaphoretic.   HENT:      Head: Normocephalic.      Right Ear: Tympanic membrane normal.      Nose: Nose normal.      Mouth/Throat:      Mouth: Mucous membranes are moist.   Eyes:      Pupils: Pupils are equal, round, and reactive to light.   Neck:      Musculoskeletal: Normal range of motion.   Cardiovascular:      Rate and Rhythm: Normal rate.   Pulmonary:      Effort: Pulmonary effort is normal.   Abdominal:      General: Abdomen is flat. Bowel sounds are normal.      Palpations: Abdomen is soft.   Musculoskeletal:      Right lower leg: Edema present.      Left lower leg: Edema present.   Skin:     General: Skin is warm and dry.      Capillary Refill: Capillary refill takes less than 2 seconds.   Neurological:      General: No focal deficit present.      Mental Status: She is alert and oriented to person, place, and time.   Psychiatric:         Mood and Affect: Mood normal.         Vitals:    10/22/20 1100   BP: 124/60   Pulse: 85   Resp: 18   Temp: 97.5 °F (36.4 °C)   SpO2: 98%   Weight: 82.1 kg (181 lb)   Height: 5' 4" (1.626 m)   PainSc:   5     Body mass index is 31.07 kg/m².    Assessment:   Selma was seen today for establish care.    Diagnoses and all orders for this visit:    Generalized weakness  Multiple falls at home  - Continue Home PT/OT       HFrEF  TTE with EF 55% and grade 1 DD, normal CVP     HTN  -Continue home meds     RA  Continue home meds  Follow up with Dr. Weston     HLD  - continue statin      COPD  -     montelukast (SINGULAIR) 10 mg tablet; Take 1 tablet (10 mg total) by mouth every evening.        Plan:     Ethical / Legal: Advance Care Planning   · Surrogate decision maker:  Name Rosy Rosado, Relationship: Daughter  · Code Status:  Full code  · [place]:  Left in home  · Other advance directive:  n/a, Capacity to make medical decisions:  Yes, Conflict No       Selma was seen today for establish care.    Diagnoses and all orders for this " visit:    Generalized weakness  Multiple falls at home  - Continue Home PT/OT       HFrEF  TTE with EF 55% and grade 1 DD, normal CVP     HTN  -Continue home meds     RA  Continue home meds  Follow up with Dr. Weston     HLD  - continue statin      COPD  -     montelukast (SINGULAIR) 10 mg tablet; Take 1 tablet (10 mg total) by mouth every evening.        Were controlled substances prescribed?  No    Follow Up Appointments:   Future Appointments   Date Time Provider Department Center   10/27/2020  2:00 PM ANA [facility] PAINMGT [facility]   10/28/2020  9:00 AM [facility] USOP1 [facility] USOUNDO [facility]   10/28/2020  3:40 PM Susan Tobias MD [facility] STROKE [place] [facility]   11/9/2020 10:00 AM Teri Ceballos MD [facility] DERM [facility]   11/9/2020  1:30 PM Rashad Monroy MD [facility] ENDO8 [facility]   1/29/2021 11:30 AM Dee Thomson MD [facility]       Signature:  Carola Saldivar, NP    Patient consent obtained prior to treatment at this visit.

## 2020-10-22 NOTE — PATIENT INSTRUCTIONS
Instructions:  - [facility] Nurse Practitioner to schedule home follow-up visit with patient in 4-6 weeks or as needed.  - Continue all medications, treatments and therapies as ordered.   - Follow all instructions, recommendations as discussed.  - Maintain Safety Precautions at all times.  - Attend all medical appointments as scheduled.  - For worsening symptoms: call Primary Care Physician or Nurse Practitioner.  - For emergencies, call 911 or immediately report to the nearest emergency room.  - Limit Risks of environmental exposure to coronavirus/COVID-19 as discussed including: social distancing, hand hygiene, and limiting departures from the home for necessities only.

## 2020-10-23 ENCOUNTER — TELEPHONE (OUTPATIENT)
Dept: RHEUMATOLOGY | Facility: CLINIC | Age: 85
End: 2020-10-23

## 2020-10-26 ENCOUNTER — TELEPHONE (OUTPATIENT)
Dept: PAIN MEDICINE | Facility: CLINIC | Age: 85
End: 2020-10-26

## 2020-10-26 ENCOUNTER — TELEPHONE (OUTPATIENT)
Dept: RHEUMATOLOGY | Facility: CLINIC | Age: 85
End: 2020-10-26

## 2020-10-26 DIAGNOSIS — M05.79 SEROPOSITIVE RHEUMATOID ARTHRITIS OF MULTIPLE SITES: Primary | ICD-10-CM

## 2020-10-26 NOTE — TELEPHONE ENCOUNTER
Staff called to confirm 10/27/20 2 pm in office visit with Glendy Xiong NP. Patient informed of instructions.     Patient did not answer therefore staff left a detailed voice message informing the patient of the above information.

## 2020-10-26 NOTE — TELEPHONE ENCOUNTER
----- Message from Eliana Rosado RN sent at 10/26/2020 12:19 PM CDT -----  Please put lab orders in epic

## 2020-10-27 ENCOUNTER — HOSPITAL ENCOUNTER (OUTPATIENT)
Dept: RADIOLOGY | Facility: OTHER | Age: 85
Discharge: HOME | End: 2020-10-27
Attending: INTERNAL MEDICINE
Payer: MEDICARE

## 2020-10-27 ENCOUNTER — OFFICE VISIT (OUTPATIENT)
Dept: PAIN MEDICINE | Facility: CLINIC | Age: 85
End: 2020-10-27
Attending: PHYSICAL MEDICINE & REHABILITATION
Payer: MEDICARE

## 2020-10-27 ENCOUNTER — PATIENT MESSAGE (OUTPATIENT)
Dept: NEUROLOGY | Facility: CLINIC | Age: 85
End: 2020-10-27

## 2020-10-27 VITALS
BODY MASS INDEX: 30.48 KG/M2 | WEIGHT: 178.56 LBS | HEART RATE: 94 BPM | RESPIRATION RATE: 18 BRPM | DIASTOLIC BLOOD PRESSURE: 65 MMHG | HEIGHT: 64 IN | TEMPERATURE: 98 F | SYSTOLIC BLOOD PRESSURE: 116 MMHG

## 2020-10-27 DIAGNOSIS — E04.2 MULTINODULAR GOITER: ICD-10-CM

## 2020-10-27 DIAGNOSIS — M47.816 SPONDYLOSIS OF LUMBAR REGION WITHOUT MYELOPATHY OR RADICULOPATHY: ICD-10-CM

## 2020-10-27 DIAGNOSIS — M16.12 PRIMARY OSTEOARTHRITIS OF LEFT HIP: ICD-10-CM

## 2020-10-27 PROCEDURE — 99213 PR OFFICE/OUTPT VISIT, EST, LEVL III, 20-29 MIN: ICD-10-PCS | Mod: S$PBB,,, | Performed by: NURSE PRACTITIONER

## 2020-10-27 PROCEDURE — 99999 PR PBB SHADOW E&M-EST. PATIENT-LVL V: ICD-10-PCS | Mod: PBBFAC,,, | Performed by: NURSE PRACTITIONER

## 2020-10-27 PROCEDURE — 76536 US EXAM OF HEAD AND NECK: CPT | Mod: 26,,, | Performed by: RADIOLOGY

## 2020-10-27 PROCEDURE — 99213 OFFICE O/P EST LOW 20 MIN: CPT | Mod: S$PBB,,, | Performed by: NURSE PRACTITIONER

## 2020-10-27 PROCEDURE — 76536 US SOFT TISSUE HEAD NECK THYROID: ICD-10-PCS | Mod: 26,,, | Performed by: RADIOLOGY

## 2020-10-27 PROCEDURE — 99999 PR PBB SHADOW E&M-EST. PATIENT-LVL V: CPT | Mod: PBBFAC,,, | Performed by: NURSE PRACTITIONER

## 2020-10-27 PROCEDURE — 99215 OFFICE O/P EST HI 40 MIN: CPT | Mod: PBBFAC | Performed by: NURSE PRACTITIONER

## 2020-10-27 PROCEDURE — 76536 US EXAM OF HEAD AND NECK: CPT | Mod: TC

## 2020-10-27 NOTE — LETTER
October 27, 2020      Dee Thomson MD  [address]  [address]  [facility] [address]           [facility]  [address]  [facility] [address]  Phone: [phone]  Fax: [phone]          Patient: Selma Lux   MR Number: [id number]   YOB: 1935   Date of Visit: 10/27/2020       Dear Dr. Dee Thomson:    Thank you for referring Selma Lux to me for evaluation. Attached you will find relevant portions of my assessment and plan of care.    If you have questions, please do not hesitate to call me. I look forward to following Selma Lux along with you.    Sincerely,    Glendy Xiong, [facility]    Enclosure  CC:  No Recipients    If you would like to receive this communication electronically, please contact [email or web address] or [phone] to request more information on [employer] Link access.    For providers and/or their staff who would like to refer a patient to [facility], please contact us through our one-stop-shop provider referral line, [facility], at [phone].    If you feel you have received this communication in error or would no longer like to receive these types of communications, please e-mail [email or web address]

## 2020-10-27 NOTE — PROGRESS NOTES
Chronic patient Established Note (Follow up visit)      SUBJECTIVE:    Selma Lux presents to the clinic for a follow-up appointment for neck, lower back and bilateral hip pain.  She was seen in the past by Dr. Gonzalez.  She had a recent visit with Dr. Thomson and was sent back here to pain management requesting to see someone new. Her primary complaint is left hip pain.  She had a left hip steroid injection last year by interventional radiology with no relief.  She has significant DJD on x-rays.  She follows up with Neurology for Botox for torticollis.  She is accompanied by her daughter who is active in her care.  Upon interview, the patient states that she is not interested in any interventional procedures at this time.  However, her daughter is interested in what the options are.  Since the last visit, Selma Lux states the pain has been worsening. Current pain intensity is 9/10.    Pain Disability Index Review:  Last 3 PDI Scores 10/27/2020 1/17/2018 4/28/2017   Pain Disability Index (PDI) 31 32 0       Pain Medications:  Tramadol PRN    Opioid Contract: no     report:  Not applicable    Pain Procedures:   Left hip injection- no benefit    Physical Therapy/Home Exercise: yes    Imaging:     Narrative & Impression     EXAMINATION:  XR HIPS BILATERAL 2 VIEW INCL AP PELVIS     CLINICAL HISTORY:  nadiya hip;  Pain in right hip     TECHNIQUE:  AP view of the pelvis and frogleg lateral views of both hips were performed.     COMPARISON:  03/11/2019     FINDINGS:  Bones appear somewhat demineralized.  Moderate to severe degenerative changes at both hips with joint space narrowing and hypertrophic spurring.  Degenerative changes also noted at the lower lumbar spine with hypertrophic spurring, disc space narrowing and probable vacuum phenomenon.  Lumbar levoscoliosis also noted.  Small rounded calcifications within the pelvis have the appearance of phleboliths.  Additional calcifications are seen within  the mid pelvis likely representing calcified uterine fibroids.  If clinically indicated, ultrasound or MR examination may be considered for further evaluation.     Impression:     DJD, similar to prior exam.  Probable calcified uterine fibroids.            Allergies: Review of patient's allergies indicates:  No Known Allergies    Current Medications:   Current Outpatient Medications   Medication Sig Dispense Refill    acetaminophen (TYLENOL) 325 MG tablet Take 2 tablets (650 mg total) by mouth every 6 (six) hours as needed for Pain or Temperature greater than (101).  0    amLODIPine (NORVASC) 10 MG tablet Take 1 tablet (10 mg total) by mouth once daily. 90 tablet 0    apixaban (ELIQUIS) 5 mg Tab Take 1 tablet (5 mg total) by mouth 2 (two) times daily. 120 tablet 3    aspirin (ECOTRIN) 81 MG EC tablet Take 81 mg by mouth once daily.      atorvastatin (LIPITOR) 40 MG tablet Take 1 tablet (40 mg total) by mouth once daily. 90 tablet 3    baclofen (LIORESAL) 10 MG tablet Take 0.5-1 tablets (5-10 mg total) by mouth 3 (three) times daily as needed. 90 tablet 2    ciclopirox (PENLAC) 8 % Soln Apply to affected nails qhs. Clean nails with alcohol q7 days. 1 Bottle 5    furosemide (LASIX) 40 MG tablet Take 1 tablet (40 mg total) by mouth once daily. TAKE 1 TABLET(40 MG) BY MOUTH DAILY 90 tablet 3    gabapentin (NEURONTIN) 300 MG capsule Take 1 capsule (300 mg total) by mouth every evening. 270 capsule 2    hydroxychloroquine (PLAQUENIL) 200 mg tablet Take 1 tablet (200 mg total) by mouth 2 (two) times daily. 180 tablet 1    magnesium oxide (MAG-OX) 400 mg (241.3 mg magnesium) tablet Take 400 mg by mouth once daily.      melatonin (MELATIN) 3 mg tablet Take 2 tablets (6 mg total) by mouth nightly as needed for Insomnia.  0    mycophenolate (CELLCEPT) 500 mg Tab TAKE 1 TABLET TWICE A  tablet 3    omeprazole (PRILOSEC) 20 MG capsule TAKE 1 CAPSULE DAILY 90 capsule 3    predniSONE (DELTASONE) 5 MG tablet  Take 2 tablets (10 mg total) by mouth once daily. 60 tablet 2    thiamine 100 MG tablet Take 100 mg by mouth once daily.      traMADoL (ULTRAM) 50 mg tablet Take 1 tablet (50 mg total) by mouth every 6 (six) hours as needed for Pain. 90 tablet 0    JUBLIA 10 % Colt Apply to affected nail(s) qd 4 mL 3    polyethylene glycol (GLYCOLAX) 17 gram PwPk Take 17 g by mouth daily as needed. (Patient not taking: Reported on 10/27/2020)  0     Current Facility-Administered Medications   Medication Dose Route Frequency Provider Last Rate Last Dose    onabotulinumtoxina injection 200 Units  200 Units Intramuscular Q90 Days Baldomero Giang MD   200 Units at 07/29/20 2303    onabotulinumtoxina injection 200 Units  200 Units Intramuscular q12 weeks Baldomero Giang MD           REVIEW OF SYSTEMS:    GENERAL:  No weight loss, malaise or fevers.  HEENT:  Negative for frequent or significant headaches.  NECK:  Negative for lumps, goiter, pain and significant neck swelling.  RESPIRATORY:  Negative for cough, wheezing or shortness of breath.  CARDIOVASCULAR:  Negative for chest pain, leg swelling or palpitations.  GI:  H/O IBS.  MUSCULOSKELETAL:  See HPI.  SKIN:  Negative for lesions, rash, and itching.  PSYCH:  Negative for sleep disturbance, mood disorder and recent psychosocial stressors.  HEMATOLOGY/LYMPHOLOGY:  Eliquis.  NEURO: H/O stroke.  All other reviewed and negative other than HPI.    Past Medical History:  Past Medical History:   Diagnosis Date    Acute cystitis without hematuria 2/27/2020    Acute hypoxemic respiratory failure 4/11/2018    Acute respiratory failure with hypoxia 3/2/2020    Altered mental status     Anemia     Arthritis     Bilateral hand pain 3/14/2018    Branch retinal vein occlusion, left eye 2/20/2015    Chronic bilateral low back pain without sciatica 3/23/2017    Chronic renal failure in pediatric patient, stage 3 (moderate) 4/15/2018    Cognitive communication deficit 12/19/2017     Cystoid macular edema, left eye 2/20/2015    Cystoid macular edema, left eye 2/20/2015    DJD (degenerative joint disease) of cervical spine 5/15/2013    Fatty liver 8/26/2014    Goiter 4/9/2018    Hashimoto's disease     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    Hypertension     Hypertensive encephalopathy     IBS (irritable bowel syndrome) 6/21/2017    IGT (impaired glucose tolerance) 1/12/2016    Iron deficiency anemia secondary to inadequate dietary iron intake 6/24/2013    Joint pain     Keratoconjunctivitis sicca of both eyes not specified as Sjogren's 7/29/2016    Leiomyoma of uterus, unspecified 9/16/2014    Long QT interval 6/29/2016    Due to medication (plaquenil)     Macular edema 1/12/2015    Multinodular goiter 1/12/2016    Neuropathy 8/23/2017    Plaquenil causing adverse effect in therapeutic use 10/7/2016    Pneumococcal vaccine refused 8/17/2016    Pneumonia due to Streptococcus pneumoniae 4/5/2018    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/2015    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    s/p Right Total knee replacement 5/15/2013    Scoliosis of thoracic spine 5/15/2013    Small vessel disease, cerebrovascular 12/28/2017    Stroke     UTI (urinary tract infection) 12/29/2018    Vascular dementia 12/6/2017       Past Surgical History:  Past Surgical History:   Procedure Laterality Date    BREAST CYST EXCISION      CATARACT EXTRACTION      COLONOSCOPY N/A 9/29/2015    Procedure: COLONOSCOPY;  Surgeon: FIDELINA Sanchez MD;  Location: [address]);  Service: Endoscopy;  Laterality: N/A;    EYE SURGERY      JOINT REPLACEMENT      right knee    KNEE SURGERY Left 12/31/2013    TKR    left parotidectomy      mixed tumor    SALIVARY GLAND SURGERY      TONSILLECTOMY         Family History:  Family History   Problem Relation Age of Onset    Hypertension Mother     Heart disease Mother     Hyperthyroidism Mother     Prostate cancer Father          "prostate cancer    Cancer Father     Breast cancer Maternal Grandmother     Hyperthyroidism Other        Social History:  Social History     Socioeconomic History    Marital status:      Spouse name: Not on file    Number of children: Not on file    Years of education: Not on file    Highest education level: Not on file   Occupational History    Not on file   Social Needs    Financial resource strain: Not hard at all    Food insecurity     Worry: Never true     Inability: Never true    Transportation needs     Medical: No     Non-medical: No   Tobacco Use    Smoking status: Never Smoker    Smokeless tobacco: Never Used   Substance and Sexual Activity    Alcohol use: Yes     Alcohol/week: 0.0 standard drinks     Frequency: Monthly or less     Drinks per session: 1 or 2     Binge frequency: Never     Comment: very seldom     Drug use: No    Sexual activity: Not Currently     Comment: ,  age 52,    Lifestyle    Physical activity     Days per week: 1 day     Minutes per session: 40 min    Stress: To some extent   Relationships    Social connections     Talks on phone: More than three times a week     Gets together: More than three times a week     Attends Yarsanism service: Not on file     Active member of club or organization: Yes     Attends meetings of clubs or organizations: 1 to 4 times per year     Relationship status:    Other Topics Concern    Are you pregnant or think you may be? No    Breast-feeding No   Social History Narrative    Not on file       OBJECTIVE:    /65   Pulse 94   Temp 98.3 °F (36.8 °C) (Oral)   Resp 18   Ht 5' 4" (1.626 m)   Wt 81 kg (178 lb 9.2 oz)   LMP  (LMP Unknown)   BMI 30.65 kg/m²     PHYSICAL EXAMINATION:    General appearance: Well appearing, in no acute distress, alert and oriented x3.  Psych:  Mood and affect appropriate.  Skin: Skin color, texture, turgor normal, no rashes or lesions, in both upper and lower " body.  Head/face:  Atraumatic, normocephalic. No palpable lymph nodes  Extremities: Peripheral joint ROM is full and pain free without obvious instability or laxity in all four extremities. No deformities, edema, or skin discoloration. Good capillary refill.  Musculoskeletal: TTP over left GTB.  Pain on internal and external rotation of both hips.  Skyler's is positive on the left.  Neuro: No loss of sensation is noted.  Gait: Antalgic- ambulates with wheelchair.    ASSESSMENT: 85 y.o. year old female with left hip pain, consistent with the following     1. Primary osteoarthritis of left hip  Ambulatory referral/consult to Pain Clinic   2. Spondylosis of lumbar region without myelopathy or radiculopathy  Ambulatory referral/consult to Pain Clinic         PLAN:     - Previous imaging was reviewed and discussed with the patient today.    - I discussed femoral and obturator blocks/RFA in detail.  Patient declined at this time.  I performed only an abbreviated exam because she declined full exam today.    - RTC PRN.    - Counseled patient regarding the importance of constant sleeping habits and physical therapy.    The above plan and management options were discussed at length with patient. Patient is in agreement with the above and verbalized understanding.    Glendy Xiong  10/27/2020

## 2020-10-28 ENCOUNTER — PATIENT MESSAGE (OUTPATIENT)
Dept: RHEUMATOLOGY | Facility: CLINIC | Age: 85
End: 2020-10-28

## 2020-10-29 DIAGNOSIS — J84.116 CRYPTOGENIC ORGANIZING PNEUMONIA: ICD-10-CM

## 2020-10-29 RX ORDER — MYCOPHENOLATE MOFETIL 500 MG/1
500 TABLET ORAL 2 TIMES DAILY
Qty: 180 TABLET | Refills: 0 | Status: SHIPPED | OUTPATIENT
Start: 2020-10-29 | End: 2021-06-08 | Stop reason: SDUPTHER

## 2020-10-31 ENCOUNTER — EXTERNAL CHRONIC CARE MANAGEMENT (OUTPATIENT)
Dept: PRIMARY CARE CLINIC | Facility: CLINIC | Age: 85
End: 2020-10-31
Payer: MEDICARE

## 2020-10-31 PROCEDURE — 99490 CHRNC CARE MGMT STAFF 1ST 20: CPT | Mod: S$PBB,,, | Performed by: FAMILY MEDICINE

## 2020-10-31 PROCEDURE — 99490 CHRNC CARE MGMT STAFF 1ST 20: CPT | Mod: PBBFAC | Performed by: FAMILY MEDICINE

## 2020-10-31 PROCEDURE — 99490 PR CHRONIC CARE MGMT, 1ST 20 MIN: ICD-10-PCS | Mod: S$PBB,,, | Performed by: FAMILY MEDICINE

## 2020-11-06 ENCOUNTER — PATIENT MESSAGE (OUTPATIENT)
Dept: DERMATOLOGY | Facility: CLINIC | Age: 85
End: 2020-11-06

## 2020-11-08 NOTE — PLAN OF CARE
Problem: Fall Injury Risk  Goal: Absence of Fall and Fall-Related Injury  Outcome: Ongoing, Progressing     Problem: Adult Inpatient Plan of Care  Goal: Plan of Care Review  Outcome: Ongoing, Progressing  Goal: Absence of Hospital-Acquired Illness or Injury  Outcome: Ongoing, Progressing     Problem: Infection  Goal: Infection Symptom Resolution  Outcome: Ongoing, Progressing     Problem: Skin Injury Risk Increased  Goal: Skin Health and Integrity  Outcome: Ongoing, Progressing     Patient AAOx4 at beginning of shift; became confused overnight and is now AAO to person and time but is easy to be reoriented. POC and meds reviewed with patient. Questions encouraged and answered. Patient verbalized understanding. V/S stable throughout shift; please see flowsheets for V/S information and full assessment data. NAEO. Siderails up x 2, bed locked in lowest position, call bell in reach, bipap on overnight, O2 & suction at bedside, WCTM.     Alert-The patient is alert, awake and responds to voice. The patient is oriented to time, place, and person. The triage nurse is able to obtain subjective information.

## 2020-11-09 ENCOUNTER — OFFICE VISIT (OUTPATIENT)
Dept: ENDOCRINOLOGY | Facility: CLINIC | Age: 85
End: 2020-11-09
Payer: MEDICARE

## 2020-11-09 VITALS
WEIGHT: 179.25 LBS | SYSTOLIC BLOOD PRESSURE: 123 MMHG | BODY MASS INDEX: 30.6 KG/M2 | DIASTOLIC BLOOD PRESSURE: 60 MMHG | OXYGEN SATURATION: 100 % | HEIGHT: 64 IN | HEART RATE: 99 BPM

## 2020-11-09 DIAGNOSIS — E66.09 CLASS 1 OBESITY DUE TO EXCESS CALORIES WITH SERIOUS COMORBIDITY AND BODY MASS INDEX (BMI) OF 30.0 TO 30.9 IN ADULT: ICD-10-CM

## 2020-11-09 DIAGNOSIS — E04.2 MULTINODULAR GOITER: Primary | ICD-10-CM

## 2020-11-09 DIAGNOSIS — R76.8 THYROID ANTIBODY POSITIVE: ICD-10-CM

## 2020-11-09 PROCEDURE — 99214 OFFICE O/P EST MOD 30 MIN: CPT | Mod: S$GLB,,, | Performed by: INTERNAL MEDICINE

## 2020-11-09 PROCEDURE — 99214 PR OFFICE/OUTPT VISIT, EST, LEVL IV, 30-39 MIN: ICD-10-PCS | Mod: S$GLB,,, | Performed by: INTERNAL MEDICINE

## 2020-11-09 NOTE — ASSESSMENT & PLAN NOTE
bmi 30.77 today   - ensure euthyroid as above.      - Last TSH slightly low so not contributing to weight gain currently   - keep f/u with PCP

## 2020-11-09 NOTE — PATIENT INSTRUCTIONS
For the thyroid, biopsy was benign. Ultrasound stable. Can repeat again in 1 year due to size, or consider surgery with your symptoms.    Think about surgery, let me know if you'd like a referral.

## 2020-11-09 NOTE — LETTER
November 9, 2020      Bhargav Lee MD  [address]  [facility] [address]           [address], [facility] [address]  [facility] [address]  Phone: [phone]  Fax: [phone]          Patient: Selma Lux   MR Number: [id number]   YOB: 1935   Date of Visit: 11/9/2020       Dear Dr. Bhargav Lee:    Thank you for referring Selma Lux to me for evaluation. Attached you will find relevant portions of my assessment and plan of care.    If you have questions, please do not hesitate to call me. I look forward to following Selma Lux along with you.    Sincerely,    Rashad Monroy MD    Enclosure  CC:  No Recipients    If you would like to receive this communication electronically, please contact [email or web address] or [phone] to request more information on [employer] Link access.    For providers and/or their staff who would like to refer a patient to [facility], please contact us through our one-stop-shop provider referral line, [facility] [place], at [phone].    If you feel you have received this communication in error or would no longer like to receive these types of communications, please e-mail [email or web address]

## 2020-11-09 NOTE — PROGRESS NOTES
Subjective:      Chief Complaint: Establish Care and Thyroid Nodule (Multinodular goiter )    HPI: Selma Lux is a 85 y.o. female who is here for a follow-up evaluation for thyroid. Last seen 11/13/2019.    With regards to the thyroid nodule:  The thyroid nodule was found on Imaging. MRI many years ago.     Also has hx hashimotos, TPO in 2007 was about 1400 per prior notes. Last lab here, 2011, TPO was 428. Hasn't needed thyroid medication, TSH has been normal for the most part.   Last TSH:   Lab Results   Component Value Date    TSH 0.395 (L) 02/28/2020    B4RQCNE 5.8 08/29/2019    FREET4 1.43 02/28/2020     Last US 10/2020:  Stable from prior.  Large right sided nodule. -> benign FNA 1/2020  Left sided 1.7 cm hyperechoic nodule -> benign FNA 1/2020    Prior ultrasound: 8/2015   - Very large right lobe. Has a nodule, was 3.5x3.24x3.25 cm in size, isoechoic with cystic degeneration   - left lobe nodule 1x0.7x1cm.  Has multiple neck images since then, last 8/2019 which noted enlarged right thyroid, substernal extension, tracheal deviation.     Had seen endocrine as well as endocrine surgery in the past, was considering surgery (last seen Dr. Wild 5/2016). Had symptoms of dysphagia, hoarseness. Had surgery planned for late 2015, but had life issues come up and postponed surgery. Was rescheduled, due for surgery around June 2016. But then hosptializations, strokes, multiple other issues came up. Hasn't had thyroid surgery.     FNA done? 2006, benign per prior notes.   then again 2020. Benign    Today, pt reports feeling okay overall.     Denies voice changes lately.  No FH thyroid cancer.  No personal history of radiation exposure, skin/hair changes, diarrhea/constipation and palpitations.     Patient reports:  Sometimes trouble swallowing, neck discomfort.  Some weight change, up/down.  Walking with a cane.    Reviewed past medical, family, social history and updated as appropriate.    Review of Systems    Constitutional: Positive for unexpected weight change (weight up/down).   HENT: Positive for trouble swallowing (sometimes) and voice change (stable lately).    Eyes: Positive for visual disturbance (slight decreased vision lately).   Respiratory: Positive for shortness of breath (with exertion).    Cardiovascular: Negative for palpitations.   Gastrointestinal: Positive for diarrhea. Negative for constipation.   Endocrine: Positive for cold intolerance. Negative for heat intolerance.   Genitourinary: Negative for frequency.   Musculoskeletal: Positive for back pain.        Left hip, other places as well   Neurological: Negative for light-headedness.     Objective:     Vitals:    11/09/20 1359   BP: 123/60   Pulse: 99     BP Readings from Last 5 Encounters:   11/09/20 123/60   10/27/20 116/65   10/22/20 124/60   10/19/20 114/61   09/10/20 118/69       Physical Exam  Vitals signs reviewed.   Constitutional:       General: She is not in acute distress.     Appearance: She is obese.   Neck:      Thyroid: Thyromegaly present.   Cardiovascular:      Heart sounds: Normal heart sounds.   Pulmonary:      Effort: Pulmonary effort is normal.   Musculoskeletal:      Right lower leg: Edema (1+ edema) present.      Left lower leg: Edema (1+ edema) present.       Wt Readings from Last 10 Encounters:   11/09/20 1359 81.3 kg (179 lb 3.7 oz)   10/27/20 1411 81 kg (178 lb 9.2 oz)   10/22/20 1100 82.1 kg (181 lb)   10/19/20 1501 82.1 kg (181 lb)   09/10/20 1422 82.1 kg (181 lb)   07/29/20 1415 82.5 kg (181 lb 14.1 oz)   07/14/20 1630 77.6 kg (171 lb)   06/01/20 1351 77.6 kg (171 lb)   04/15/20 0608 77.7 kg (171 lb 4.8 oz)   04/14/20 0613 78.1 kg (172 lb 2.9 oz)   04/12/20 0438 81.3 kg (179 lb 3.7 oz)   04/09/20 0500 77.1 kg (169 lb 15.6 oz)   04/06/20 0600 78.4 kg (172 lb 13.5 oz)   04/05/20 0500 79.3 kg (174 lb 13.2 oz)   03/31/20 1354 79.1 kg (174 lb 6.1 oz)   03/31/20 0500 79.1 kg (174 lb 6.1 oz)   03/30/20 0600 81.5 kg (179 lb  10.8 oz)   03/29/20 0600 81 kg (178 lb 9.2 oz)   03/28/20 0546 81.6 kg (179 lb 14.3 oz)   03/27/20 1854 81.6 kg (179 lb 14.3 oz)   03/01/20 0900 84.4 kg (186 lb)   02/28/20 0800 84.8 kg (186 lb 15.9 oz)   02/28/20 0230 84.8 kg (187 lb)   02/28/20 0227 84.8 kg (187 lb)   02/28/20 0105 84.8 kg (187 lb)   02/28/20 0100 84.8 kg (187 lb)   02/27/20 1745 84.8 kg (187 lb)   02/27/20 1540 91.6 kg (202 lb)       Lab Results   Component Value Date    HGBA1C 5.6 08/27/2019     Lab Results   Component Value Date    CHOL 197 08/26/2019    HDL 90 (H) 08/26/2019    LDLCALC 91.6 08/26/2019    TRIG 77 08/26/2019    CHOLHDL 45.7 08/26/2019     Lab Results   Component Value Date     10/27/2020    K 3.6 10/27/2020     10/27/2020    CO2 28 10/27/2020    GLU 70 10/27/2020    BUN 19 10/27/2020    CREATININE 1.3 10/27/2020    CALCIUM 9.0 10/27/2020    PROT 6.6 10/27/2020    ALBUMIN 3.8 10/27/2020    BILITOT 1.1 (H) 10/27/2020    ALKPHOS 63 10/27/2020    AST 10 10/27/2020    ALT 9 (L) 10/27/2020    ANIONGAP 11 10/27/2020    ESTGFRAFRICA 44 (A) 10/27/2020    EGFRNONAA 38 (A) 10/27/2020    TSH 0.395 (L) 02/28/2020        Assessment/Plan:     Multinodular goiter  Pt with large right sided nodule, compressive symptoms   - had surgery evaluation before, cancelled surgery 2x. Had a lot of other things come up, multiple hospitalizations, rehab/recovery time, etc.   - US last year with increased size   - s/p 2x FNA, benign for both largest nodules   - last US 2020 stable in size overall   - still with compressive symptoms   - discussed with pt and family the option for surgery evaluation. Pt declines at this time.   - continue to monitor, repeat US next year      Thyroid antibody positive  Has elevated TPO in the past    - last TSH slightly low actually   - clinically, biochemically euthyroid   - monitor TSH yearly      Class 1 obesity due to excess calories with serious comorbidity in adult  bmi 30.77 today   - ensure euthyroid as  above.      - Last TSH slightly low so not contributing to weight gain currently   - keep f/u with PCP        Follow up in about 1 year (around 11/9/2021) for lab review, further monitoring.      Rashad Monroy MD  Endocrinology

## 2020-11-09 NOTE — ASSESSMENT & PLAN NOTE
Pt with large right sided nodule, compressive symptoms   - had surgery evaluation before, cancelled surgery 2x. Had a lot of other things come up, multiple hospitalizations, rehab/recovery time, etc.   - US last year with increased size   - s/p 2x FNA, benign for both largest nodules   - last US 2020 stable in size overall   - still with compressive symptoms   - discussed with pt and family the option for surgery evaluation. Pt declines at this time.   - continue to monitor, repeat US next year

## 2020-11-19 ENCOUNTER — PATIENT OUTREACH (OUTPATIENT)
Dept: ADMINISTRATIVE | Facility: OTHER | Age: 85
End: 2020-11-19

## 2020-11-20 ENCOUNTER — PATIENT MESSAGE (OUTPATIENT)
Dept: DERMATOLOGY | Facility: CLINIC | Age: 85
End: 2020-11-20

## 2020-11-23 ENCOUNTER — OFFICE VISIT (OUTPATIENT)
Dept: DERMATOLOGY | Facility: CLINIC | Age: 85
End: 2020-11-23
Payer: MEDICARE

## 2020-11-23 VITALS — TEMPERATURE: 98 F

## 2020-11-23 DIAGNOSIS — B37.2 CANDIDIASIS OF NAIL: Primary | ICD-10-CM

## 2020-11-23 PROCEDURE — 99213 PR OFFICE/OUTPT VISIT, EST, LEVL III, 20-29 MIN: ICD-10-PCS | Mod: S$GLB,,, | Performed by: DERMATOLOGY

## 2020-11-23 PROCEDURE — 99213 OFFICE O/P EST LOW 20 MIN: CPT | Mod: S$GLB,,, | Performed by: DERMATOLOGY

## 2020-11-23 NOTE — PROGRESS NOTES
Subjective:       Patient ID:  Selma Lux is a 85 y.o. female who presents for   Chief Complaint   Patient presents with    Nail Problem     fingers     Nail Problem - Follow-up  Diagnosis: Candidiasis of nail.  Symptom course: improving  Currently using: ciclopirox nail lacquer.  Affected locations: right fingers and left fingers  SIGNS AND SYMPTOMS F/U: discoloration seems to be growing out.  Severity: mild      Review of Systems   Skin: Negative for itching and rash.   Hematologic/Lymphatic: Bruises/bleeds easily.        Objective:    Physical Exam   Constitutional: She appears well-developed and well-nourished. No distress.   Eyes: Lids are normal.  No conjunctival no injection.   Neurological: She is alert and oriented to person, place, and time. She is not disoriented.   Psychiatric: She has a normal mood and affect.   Skin:   Areas Examined (abnormalities noted in diagram):   Head / Face Inspection Performed  RUE Inspected  LUE Inspection Performed  Nails and Digits Inspection Performed             Diagram Legend     Erythematous scaling macule/papule c/w actinic keratosis       Vascular papule c/w angioma      Pigmented verrucoid papule/plaque c/w seborrheic keratosis      Yellow umbilicated papule c/w sebaceous hyperplasia      Irregularly shaped tan macule c/w lentigo     1-2 mm smooth white papules consistent with Milia      Movable subcutaneous cyst with punctum c/w epidermal inclusion cyst      Subcutaneous movable cyst c/w pilar cyst      Firm pink to brown papule c/w dermatofibroma      Pedunculated fleshy papule(s) c/w skin tag(s)      Evenly pigmented macule c/w junctional nevus     Mildly variegated pigmented, slightly irregular-bordered macule c/w mildly atypical nevus      Flesh colored to evenly pigmented papule c/w intradermal nevus       Pink pearly papule/plaque c/w basal cell carcinoma      Erythematous hyperkeratotic cursted plaque c/w SCC      Surgical scar with no sign of  skin cancer recurrence      Open and closed comedones      Inflammatory papules and pustules      Verrucoid papule consistent consistent with wart     Erythematous eczematous patches and plaques     Dystrophic onycholytic nail with subungual debris c/w onychomycosis     Umbilicated papule    Erythematous-base heme-crusted tan verrucoid plaque consistent with inflamed seborrheic keratosis     Erythematous Silvery Scaling Plaque c/w Psoriasis     See annotation      Assessment / Plan:        Candidiasis of nail   Improving, but still in need of active treatment as problem remains unstable.    Continue ciclopirox 8 % lacquer. Apply to affected nails qhs. Clean nails with alcohol q7 days.  Trim any overhanging nail plate, and treat distal edge of nail plate as well.    Follow up if symptoms worsen or fail to improve.

## 2020-11-30 ENCOUNTER — EXTERNAL CHRONIC CARE MANAGEMENT (OUTPATIENT)
Dept: PRIMARY CARE CLINIC | Facility: CLINIC | Age: 85
End: 2020-11-30
Payer: MEDICARE

## 2020-11-30 PROCEDURE — 99490 CHRNC CARE MGMT STAFF 1ST 20: CPT | Mod: PBBFAC | Performed by: FAMILY MEDICINE

## 2020-11-30 PROCEDURE — 99490 PR CHRONIC CARE MGMT, 1ST 20 MIN: ICD-10-PCS | Mod: S$PBB,,, | Performed by: FAMILY MEDICINE

## 2020-11-30 PROCEDURE — 99490 CHRNC CARE MGMT STAFF 1ST 20: CPT | Mod: S$PBB,,, | Performed by: FAMILY MEDICINE

## 2020-12-03 ENCOUNTER — CARE AT HOME (OUTPATIENT)
Dept: HOME HEALTH SERVICES | Facility: CLINIC | Age: 85
End: 2020-12-03
Payer: MEDICARE

## 2020-12-03 DIAGNOSIS — N18.30 CHRONIC RENAL FAILURE SYNDROME, STAGE 3 (MODERATE): ICD-10-CM

## 2020-12-03 DIAGNOSIS — I48.0 AF (PAROXYSMAL ATRIAL FIBRILLATION): Primary | ICD-10-CM

## 2020-12-03 RX ORDER — MONTELUKAST SODIUM 4 MG/1
4 TABLET, CHEWABLE ORAL NIGHTLY
Qty: 30 TABLET | Refills: 1 | Status: SHIPPED | OUTPATIENT
Start: 2020-12-03 | End: 2021-01-02

## 2020-12-11 ENCOUNTER — PES CALL (OUTPATIENT)
Dept: ADMINISTRATIVE | Facility: CLINIC | Age: 85
End: 2020-12-11

## 2020-12-14 NOTE — PROGRESS NOTES
[facility]  Medical Home Visit    Visit Date: 12/03/2020  Encounter Provider: Carola Saldivar NP  PCP:  Bhargav Hirsch MD    Subjective:      Patient ID: Selma Lux is a 85 y.o. female.    Consult Requested By:  No ref. provider found  Reason for Consult:  Hospital Follow up    Selma is being seen at home due to  physical debility that presents a taxing effort to leave the home, to mitigate high risk of hospital readmission or due to the limited availability of reliable or safe options for transportation to the point of access to the provider. Prior to treatment on this visit the chart was reviewed and patient consent was obtained.  .      Chief Complaint: Establish Care, Physical Deconditioning    Follow-up     Selma Lux is an 84 year old female with a past medical history of Hashimoto's disease, HFrEF, COPD, Cryptogenic organizing pneumonia on chronic prednisone, RA on plaquenil and cellcept, HTN, HPLD, TIA, vascular dementia, pulmonary HTN secondary to ILD, Long QT interval , Multinodular goiter, Arthritis, Anemia and Physical Deconditioning. Past surgical history that includes Salivary gland surgery; Tonsillectomy; left parotidectomy; Cataract extraction; Colonoscopy (N/A, 9/29/2015); Joint replacement; Knee surgery (Left, 12/31/2013); Eye surgery; and Breast cyst excision.      Today she is being seen at home for a follow up due to recent debility. 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 She is ambulatory with [facility], lives in [place] with her daughter who is her primary caretaker and is present for the visit. She has complaints of back pain that has worsened over the past couple of years. She is treated by Dr. Thomson and receives botox injections, she reports her last visit was 09/2020 and continues to take her gabapentin in which she explains provides some relief, she is scheduled to follow up with Dr. Thomson 10/19/2020.  She reports her HH PT was helping but has recently stopped. I will continue home PT until she follows up with Dr. Thomson.      She denies chest pains, denies fever or shortness of breath, reports she has followed all CDC guidelines including social distancing and wearing face covering when out of the home. VSS, all medications reviewed.    Attestation: Screening criteria to assess the level of the patient's risk for infection with COVID-19 as recommended by the CDC at the time of the above documented home visit concluded appropriateness to proceed. Universal precautions were maintained at all times, including provider use of 60% alcohol gel hand  immediately prior to entry and upon departing the patient's home.    Review of Systems  Constitutional: Negative for activity change and unexpected weight change.   HENT: Negative for hearing loss, rhinorrhea and trouble swallowing.    Eyes: Negative for discharge and visual disturbance.   Respiratory: Negative for chest tightness and wheezing.    Cardiovascular: Negative for chest pain and palpitations.   Gastrointestinal: Negative for blood in stool, constipation, diarrhea and vomiting.   Endocrine: Negative for polydipsia and polyuria.   Genitourinary: Negative for difficulty urinating, dysuria, hematuria and menstrual problem.   Musculoskeletal: Positive for arthralgias, gait problem, and neck stiffness. Negative for joint swelling.   Neurological: Positive for weakness .   Psychiatric/Behavioral: Negative for confusion, decreased concentration and dysphoric mood.    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     Appearance:  She is not diaphoretic.   HENT:      Head: Normocephalic.      Right Ear: Tympanic membrane normal.      Nose: Nose normal.      Mouth/Throat:      Mouth: Mucous membranes are moist.   Eyes:      Pupils: Pupils are equal, round, and reactive to light.   Neck:      Musculoskeletal: Normal range of motion.   Cardiovascular:      Rate and Rhythm: Normal rate.   Pulmonary:      Effort: Pulmonary effort is normal.   Abdominal:      General: Abdomen is flat. Bowel sounds are normal.      Palpations: Abdomen is soft.   Musculoskeletal:      Right lower leg: Edema present.      Left lower leg: Edema present.   Skin:     General: Skin is warm and dry.      Capillary Refill: Capillary refill takes less than 2 seconds.   Neurological:      General: No focal deficit present.      Mental Status: She is alert and oriented to person, place, and time.   Psychiatric:         Mood and Affect: Mood normal.         There were no vitals filed for this visit.  There is no height or weight on file to calculate BMI.    Assessment:   Selma was seen today for establish care.    Diagnoses and all orders for this visit:    Generalized weakness  Multiple falls at home  - Continue Home PT/OT       HFrEF  TTE with EF 55% and grade 1 DD, normal CVP     HTN  -Continue home meds     RA  Continue home meds  Follow up with Dr. Weston     HLD  - continue statin      COPD  -     montelukast (SINGULAIR) 10 mg tablet; Take 1 tablet (10 mg total) by mouth every evening.        Plan:     Ethical / Legal: Advance Care Planning   · Surrogate decision maker:  Kinga Rosado, Relationship: Daughter  · Code Status:  Full code  · [place]  Left in home  · Other advance directive:  n/a, Capacity to make medical decisions:  Yes, Conflict No       Selma was seen today for establish care.    Diagnoses and all orders for this visit:    Generalized weakness  Multiple falls at home  - Continue Home PT/OT       HFrEF  TTE with EF 55% and grade 1 DD, normal  CVP     HTN  -Continue home meds     RA  Continue home meds  Follow up with Dr. Weston     HLD  - continue statin      COPD  -     montelukast (SINGULAIR) 10 mg tablet; Take 1 tablet (10 mg total) by mouth every evening.        Were controlled substances prescribed?  No    Follow Up Appointments:   Future Appointments   Date Time Provider Department Center   1/25/2021 10:30 AM Marizol Rosado DNP [facility] [address] [facility]   1/29/2021 11:30 AM Dee Thomson MD [facility]   11/2/2021 12:30 PM LAB, [facility]   11/2/2021  1:30 PM [facility] USOP[facility]       Signature:  Carola Saldivar, NP    Patient consent obtained prior to treatment at this visit.

## 2020-12-16 VITALS
DIASTOLIC BLOOD PRESSURE: 71 MMHG | WEIGHT: 179 LBS | RESPIRATION RATE: 18 BRPM | TEMPERATURE: 98 F | HEIGHT: 64 IN | OXYGEN SATURATION: 98 % | SYSTOLIC BLOOD PRESSURE: 130 MMHG | HEART RATE: 86 BPM | BODY MASS INDEX: 30.56 KG/M2

## 2020-12-22 RX ORDER — OMEPRAZOLE 20 MG/1
20 CAPSULE, DELAYED RELEASE ORAL DAILY
Qty: 90 CAPSULE | Refills: 3 | Status: SHIPPED | OUTPATIENT
Start: 2020-12-22 | End: 2021-06-10 | Stop reason: SDUPTHER

## 2020-12-22 RX ORDER — AMLODIPINE BESYLATE 10 MG/1
10 TABLET ORAL DAILY
Qty: 90 TABLET | Refills: 0 | Status: SHIPPED | OUTPATIENT
Start: 2020-12-22 | End: 2021-04-07 | Stop reason: SDUPTHER

## 2020-12-31 ENCOUNTER — EXTERNAL CHRONIC CARE MANAGEMENT (OUTPATIENT)
Dept: PRIMARY CARE CLINIC | Facility: CLINIC | Age: 85
End: 2020-12-31
Payer: MEDICARE

## 2020-12-31 PROCEDURE — 99490 CHRNC CARE MGMT STAFF 1ST 20: CPT | Mod: PBBFAC | Performed by: FAMILY MEDICINE

## 2020-12-31 PROCEDURE — 99490 CHRNC CARE MGMT STAFF 1ST 20: CPT | Mod: S$PBB,,, | Performed by: FAMILY MEDICINE

## 2020-12-31 PROCEDURE — 99490 PR CHRONIC CARE MGMT, 1ST 20 MIN: ICD-10-PCS | Mod: S$PBB,,, | Performed by: FAMILY MEDICINE

## 2021-01-08 RX ORDER — PREDNISONE 5 MG/1
10 TABLET ORAL DAILY
Qty: 60 TABLET | Refills: 2 | Status: SHIPPED | OUTPATIENT
Start: 2021-01-08 | End: 2021-04-12 | Stop reason: SDUPTHER

## 2021-01-09 ENCOUNTER — IMMUNIZATION (OUTPATIENT)
Dept: INTERNAL MEDICINE | Facility: CLINIC | Age: 86
End: 2021-01-09
Payer: MEDICARE

## 2021-01-09 DIAGNOSIS — Z23 NEED FOR VACCINATION: ICD-10-CM

## 2021-01-09 PROCEDURE — 91300 COVID-19, MRNA, LNP-S, PF, 30 MCG/0.3 ML DOSE VACCINE: CPT | Mod: PBBFAC

## 2021-01-12 RX ORDER — BACLOFEN 10 MG/1
5-10 TABLET ORAL 3 TIMES DAILY PRN
Qty: 90 TABLET | Refills: 2 | Status: SHIPPED | OUTPATIENT
Start: 2021-01-12 | End: 2021-09-23 | Stop reason: SDUPTHER

## 2021-01-13 ENCOUNTER — PATIENT MESSAGE (OUTPATIENT)
Dept: CARDIOLOGY | Facility: CLINIC | Age: 86
End: 2021-01-13

## 2021-01-14 ENCOUNTER — CARE AT HOME (OUTPATIENT)
Dept: HOME HEALTH SERVICES | Facility: CLINIC | Age: 86
End: 2021-01-14
Payer: MEDICARE

## 2021-01-14 VITALS
OXYGEN SATURATION: 99 % | SYSTOLIC BLOOD PRESSURE: 126 MMHG | HEIGHT: 64 IN | WEIGHT: 179 LBS | RESPIRATION RATE: 18 BRPM | HEART RATE: 85 BPM | TEMPERATURE: 97 F | DIASTOLIC BLOOD PRESSURE: 77 MMHG | BODY MASS INDEX: 30.56 KG/M2

## 2021-01-14 DIAGNOSIS — I48.0 AF (PAROXYSMAL ATRIAL FIBRILLATION): Primary | ICD-10-CM

## 2021-01-21 NOTE — PROGRESS NOTES
HPI     DLS: Pt states vision is not good. No flashes no floaters no blurred   vision.       OCT - OD - No ME  OS - CME resolved    HVF - some constriction OU  ?reliability      A/P    1) Cystoid Macular Edema OS:   -ANA and hard exudates resolved, BP well-controlled in notes   -Leaking of ANA and another discrete point on previous FA    -S/P focal laser OS 1/12/15   stable    2) Plaquenil Screening   -Pt  started on plaquenil for RA 1/15   -No evidence of bullseye maculopathy, retinal outer layers appear normal OU    -Will get visual field testing once #1 resolved    3-PCIOL OU  Monovision OS        12 months OCT  Will forego HVF in future because of poor reliability        
No

## 2021-01-25 ENCOUNTER — OFFICE VISIT (OUTPATIENT)
Dept: INTERNAL MEDICINE | Facility: CLINIC | Age: 86
End: 2021-01-25
Payer: MEDICARE

## 2021-01-25 VITALS
WEIGHT: 169.31 LBS | HEART RATE: 86 BPM | HEIGHT: 64 IN | OXYGEN SATURATION: 97 % | DIASTOLIC BLOOD PRESSURE: 76 MMHG | BODY MASS INDEX: 28.91 KG/M2 | SYSTOLIC BLOOD PRESSURE: 122 MMHG

## 2021-01-25 DIAGNOSIS — E66.3 OVERWEIGHT (BMI 25.0-29.9): ICD-10-CM

## 2021-01-25 DIAGNOSIS — I70.0 AORTIC ATHEROSCLEROSIS: ICD-10-CM

## 2021-01-25 DIAGNOSIS — Z23 NEED FOR 23-POLYVALENT PNEUMOCOCCAL POLYSACCHARIDE VACCINE: ICD-10-CM

## 2021-01-25 DIAGNOSIS — M05.79 SEROPOSITIVE RHEUMATOID ARTHRITIS OF MULTIPLE SITES: ICD-10-CM

## 2021-01-25 DIAGNOSIS — J44.9 CHRONIC OBSTRUCTIVE PULMONARY DISEASE, UNSPECIFIED COPD TYPE: ICD-10-CM

## 2021-01-25 DIAGNOSIS — Z86.73 HISTORY OF TRANSIENT ISCHEMIC ATTACK (TIA): ICD-10-CM

## 2021-01-25 DIAGNOSIS — I10 HYPERTENSION, ESSENTIAL: ICD-10-CM

## 2021-01-25 DIAGNOSIS — D84.9 IMMUNOSUPPRESSED STATUS: ICD-10-CM

## 2021-01-25 DIAGNOSIS — E78.2 MIXED HYPERLIPIDEMIA: ICD-10-CM

## 2021-01-25 DIAGNOSIS — R41.89 COGNITIVE IMPAIRMENT: ICD-10-CM

## 2021-01-25 DIAGNOSIS — Z23 NEED FOR INFLUENZA VACCINATION: ICD-10-CM

## 2021-01-25 DIAGNOSIS — I48.0 AF (PAROXYSMAL ATRIAL FIBRILLATION): ICD-10-CM

## 2021-01-25 DIAGNOSIS — I67.89 CEREBRAL MICROVASCULAR DISEASE: ICD-10-CM

## 2021-01-25 DIAGNOSIS — N18.30 CHRONIC RENAL FAILURE SYNDROME, STAGE 3 (MODERATE): ICD-10-CM

## 2021-01-25 DIAGNOSIS — Z00.00 ENCOUNTER FOR PREVENTIVE HEALTH EXAMINATION: Primary | ICD-10-CM

## 2021-01-25 DIAGNOSIS — F39 MOOD DISORDER: ICD-10-CM

## 2021-01-25 DIAGNOSIS — Z79.01 ANTICOAGULANT LONG-TERM USE: ICD-10-CM

## 2021-01-25 DIAGNOSIS — I27.23 PULMONARY HYPERTENSION DUE TO INTERSTITIAL LUNG DISEASE: ICD-10-CM

## 2021-01-25 DIAGNOSIS — J84.9 PULMONARY HYPERTENSION DUE TO INTERSTITIAL LUNG DISEASE: ICD-10-CM

## 2021-01-25 PROCEDURE — 99213 OFFICE O/P EST LOW 20 MIN: CPT | Mod: PBBFAC | Performed by: NURSE PRACTITIONER

## 2021-01-25 PROCEDURE — G0439 PR MEDICARE ANNUAL WELLNESS SUBSEQUENT VISIT: ICD-10-PCS | Mod: ,,, | Performed by: NURSE PRACTITIONER

## 2021-01-25 PROCEDURE — G0439 PPPS, SUBSEQ VISIT: HCPCS | Mod: ,,, | Performed by: NURSE PRACTITIONER

## 2021-01-25 PROCEDURE — 99999 PR PBB SHADOW E&M-EST. PATIENT-LVL III: ICD-10-PCS | Mod: PBBFAC,,, | Performed by: NURSE PRACTITIONER

## 2021-01-25 PROCEDURE — 99999 PR PBB SHADOW E&M-EST. PATIENT-LVL III: CPT | Mod: PBBFAC,,, | Performed by: NURSE PRACTITIONER

## 2021-01-25 RX ORDER — MONTELUKAST SODIUM 10 MG/1
10 TABLET ORAL NIGHTLY
COMMUNITY
End: 2021-04-07 | Stop reason: SDUPTHER

## 2021-01-29 ENCOUNTER — OFFICE VISIT (OUTPATIENT)
Dept: SPINE | Facility: CLINIC | Age: 86
End: 2021-01-29
Attending: PHYSICAL MEDICINE & REHABILITATION
Payer: MEDICARE

## 2021-01-29 VITALS
DIASTOLIC BLOOD PRESSURE: 56 MMHG | HEART RATE: 84 BPM | SYSTOLIC BLOOD PRESSURE: 120 MMHG | HEIGHT: 64 IN | WEIGHT: 169.31 LBS | BODY MASS INDEX: 28.91 KG/M2

## 2021-01-29 DIAGNOSIS — G25.5 HEMICHOREA: ICD-10-CM

## 2021-01-29 DIAGNOSIS — M62.838 MUSCLE SPASM: ICD-10-CM

## 2021-01-29 DIAGNOSIS — G89.29 CHRONIC LEFT SHOULDER PAIN: ICD-10-CM

## 2021-01-29 DIAGNOSIS — M25.512 CHRONIC LEFT SHOULDER PAIN: ICD-10-CM

## 2021-01-29 DIAGNOSIS — M47.816 SPONDYLOSIS OF LUMBAR REGION WITHOUT MYELOPATHY OR RADICULOPATHY: ICD-10-CM

## 2021-01-29 DIAGNOSIS — M54.2 NECK PAIN: ICD-10-CM

## 2021-01-29 DIAGNOSIS — M19.011 PRIMARY OSTEOARTHRITIS OF BOTH SHOULDERS: ICD-10-CM

## 2021-01-29 DIAGNOSIS — M16.12 PRIMARY OSTEOARTHRITIS OF LEFT HIP: Primary | ICD-10-CM

## 2021-01-29 DIAGNOSIS — M19.012 PRIMARY OSTEOARTHRITIS OF BOTH SHOULDERS: ICD-10-CM

## 2021-01-29 DIAGNOSIS — M47.812 SPONDYLOSIS OF CERVICAL REGION WITHOUT MYELOPATHY OR RADICULOPATHY: ICD-10-CM

## 2021-01-29 DIAGNOSIS — G24.3 ISOLATED CERVICAL DYSTONIA: ICD-10-CM

## 2021-01-29 PROCEDURE — 99999 PR PBB SHADOW E&M-EST. PATIENT-LVL IV: CPT | Mod: PBBFAC,,, | Performed by: PHYSICAL MEDICINE & REHABILITATION

## 2021-01-29 PROCEDURE — 99214 PR OFFICE/OUTPT VISIT, EST, LEVL IV, 30-39 MIN: ICD-10-PCS | Mod: S$PBB,,, | Performed by: PHYSICAL MEDICINE & REHABILITATION

## 2021-01-29 PROCEDURE — 99999 PR PBB SHADOW E&M-EST. PATIENT-LVL IV: ICD-10-PCS | Mod: PBBFAC,,, | Performed by: PHYSICAL MEDICINE & REHABILITATION

## 2021-01-29 PROCEDURE — 99214 OFFICE O/P EST MOD 30 MIN: CPT | Mod: S$PBB,,, | Performed by: PHYSICAL MEDICINE & REHABILITATION

## 2021-01-29 PROCEDURE — 99214 OFFICE O/P EST MOD 30 MIN: CPT | Mod: PBBFAC | Performed by: PHYSICAL MEDICINE & REHABILITATION

## 2021-01-30 ENCOUNTER — IMMUNIZATION (OUTPATIENT)
Dept: INTERNAL MEDICINE | Facility: CLINIC | Age: 86
End: 2021-01-30
Payer: MEDICARE

## 2021-01-30 DIAGNOSIS — Z23 NEED FOR VACCINATION: Primary | ICD-10-CM

## 2021-01-30 PROCEDURE — 0002A PR IMMUNIZ ADMIN, SARS-COV-2 COVID-19 VACC, 30MCG/0.3ML, 2ND DOSE: CPT | Mod: PBBFAC

## 2021-01-30 PROCEDURE — 91300 PR SARS-COV- 2 COVID-19 VACCINE, NO PRSV, 30MCG/0.3ML, IM: CPT | Mod: PBBFAC

## 2021-01-30 RX ADMIN — RNA INGREDIENT BNT-162B2 0.3 ML: 0.23 INJECTION, SUSPENSION INTRAMUSCULAR at 02:01

## 2021-01-31 ENCOUNTER — EXTERNAL CHRONIC CARE MANAGEMENT (OUTPATIENT)
Dept: PRIMARY CARE CLINIC | Facility: CLINIC | Age: 86
End: 2021-01-31
Payer: MEDICARE

## 2021-01-31 PROCEDURE — 99490 CHRNC CARE MGMT STAFF 1ST 20: CPT | Mod: S$PBB,,, | Performed by: FAMILY MEDICINE

## 2021-01-31 PROCEDURE — 99490 PR CHRONIC CARE MGMT, 1ST 20 MIN: ICD-10-PCS | Mod: S$PBB,,, | Performed by: FAMILY MEDICINE

## 2021-01-31 PROCEDURE — 99490 CHRNC CARE MGMT STAFF 1ST 20: CPT | Mod: PBBFAC | Performed by: FAMILY MEDICINE

## 2021-02-12 ENCOUNTER — CARE AT HOME (OUTPATIENT)
Dept: HOME HEALTH SERVICES | Facility: CLINIC | Age: 86
End: 2021-02-12
Payer: MEDICARE

## 2021-02-12 DIAGNOSIS — I48.0 AF (PAROXYSMAL ATRIAL FIBRILLATION): Primary | ICD-10-CM

## 2021-02-12 PROCEDURE — 99442 PR PHYSICIAN TELEPHONE EVALUATION 11-20 MIN: CPT | Mod: 95,,, | Performed by: NURSE PRACTITIONER

## 2021-02-12 PROCEDURE — 99442 PR PHYSICIAN TELEPHONE EVALUATION 11-20 MIN: ICD-10-PCS | Mod: 95,,, | Performed by: NURSE PRACTITIONER

## 2021-02-15 VITALS — BODY MASS INDEX: 28.85 KG/M2 | HEIGHT: 64 IN | WEIGHT: 169 LBS

## 2021-02-23 ENCOUNTER — PATIENT MESSAGE (OUTPATIENT)
Dept: RHEUMATOLOGY | Facility: CLINIC | Age: 86
End: 2021-02-23

## 2021-02-25 ENCOUNTER — OFFICE VISIT (OUTPATIENT)
Dept: GASTROENTEROLOGY | Facility: CLINIC | Age: 86
End: 2021-02-25
Payer: MEDICARE

## 2021-02-25 DIAGNOSIS — K59.04 CHRONIC IDIOPATHIC CONSTIPATION: Primary | ICD-10-CM

## 2021-02-25 PROCEDURE — 99214 OFFICE O/P EST MOD 30 MIN: CPT | Mod: 95,,, | Performed by: INTERNAL MEDICINE

## 2021-02-25 PROCEDURE — 99214 PR OFFICE/OUTPT VISIT, EST, LEVL IV, 30-39 MIN: ICD-10-PCS | Mod: 95,,, | Performed by: INTERNAL MEDICINE

## 2021-02-28 ENCOUNTER — EXTERNAL CHRONIC CARE MANAGEMENT (OUTPATIENT)
Dept: PRIMARY CARE CLINIC | Facility: CLINIC | Age: 86
End: 2021-02-28
Payer: MEDICARE

## 2021-02-28 PROCEDURE — 99490 CHRNC CARE MGMT STAFF 1ST 20: CPT | Mod: PBBFAC | Performed by: FAMILY MEDICINE

## 2021-02-28 PROCEDURE — 99490 CHRNC CARE MGMT STAFF 1ST 20: CPT | Mod: S$PBB,,, | Performed by: FAMILY MEDICINE

## 2021-02-28 PROCEDURE — 99490 PR CHRONIC CARE MGMT, 1ST 20 MIN: ICD-10-PCS | Mod: S$PBB,,, | Performed by: FAMILY MEDICINE

## 2021-03-01 DIAGNOSIS — M05.79 SEROPOSITIVE RHEUMATOID ARTHRITIS OF MULTIPLE SITES: ICD-10-CM

## 2021-03-01 DIAGNOSIS — M06.9 RHEUMATOID ARTHRITIS INVOLVING MULTIPLE JOINTS: Primary | ICD-10-CM

## 2021-03-01 RX ORDER — TRAMADOL HYDROCHLORIDE 50 MG/1
50 TABLET ORAL EVERY 6 HOURS PRN
Qty: 90 TABLET | Refills: 0 | Status: ON HOLD | OUTPATIENT
Start: 2021-03-01 | End: 2022-10-13 | Stop reason: HOSPADM

## 2021-03-01 RX ORDER — HYDROXYCHLOROQUINE SULFATE 200 MG/1
200 TABLET, FILM COATED ORAL 2 TIMES DAILY
Qty: 180 TABLET | Refills: 1 | Status: CANCELLED | OUTPATIENT
Start: 2021-03-01 | End: 2021-05-30

## 2021-03-01 RX ORDER — HYDROXYCHLOROQUINE SULFATE 200 MG/1
200 TABLET, FILM COATED ORAL 2 TIMES DAILY
Qty: 180 TABLET | Refills: 1 | Status: SHIPPED | OUTPATIENT
Start: 2021-03-01 | End: 2021-09-07

## 2021-03-11 ENCOUNTER — TELEPHONE (OUTPATIENT)
Dept: PAIN MEDICINE | Facility: CLINIC | Age: 86
End: 2021-03-11

## 2021-03-12 ENCOUNTER — OFFICE VISIT (OUTPATIENT)
Dept: PAIN MEDICINE | Facility: CLINIC | Age: 86
End: 2021-03-12
Payer: MEDICARE

## 2021-03-12 DIAGNOSIS — G89.4 CHRONIC PAIN DISORDER: ICD-10-CM

## 2021-03-12 DIAGNOSIS — M16.12 PRIMARY OSTEOARTHRITIS OF LEFT HIP: Primary | ICD-10-CM

## 2021-03-12 PROCEDURE — 99212 PR OFFICE/OUTPT VISIT, EST, LEVL II, 10-19 MIN: ICD-10-PCS | Mod: 95,,, | Performed by: NURSE PRACTITIONER

## 2021-03-12 PROCEDURE — 99212 OFFICE O/P EST SF 10 MIN: CPT | Mod: 95,,, | Performed by: NURSE PRACTITIONER

## 2021-03-15 ENCOUNTER — TELEPHONE (OUTPATIENT)
Dept: PAIN MEDICINE | Facility: CLINIC | Age: 86
End: 2021-03-15

## 2021-03-16 ENCOUNTER — TELEPHONE (OUTPATIENT)
Dept: PAIN MEDICINE | Facility: CLINIC | Age: 86
End: 2021-03-16

## 2021-03-17 ENCOUNTER — OFFICE VISIT (OUTPATIENT)
Dept: GASTROENTEROLOGY | Facility: CLINIC | Age: 86
End: 2021-03-17
Payer: MEDICARE

## 2021-03-17 DIAGNOSIS — K59.04 CHRONIC IDIOPATHIC CONSTIPATION: Primary | ICD-10-CM

## 2021-03-17 PROCEDURE — 99213 PR OFFICE/OUTPT VISIT, EST, LEVL III, 20-29 MIN: ICD-10-PCS | Mod: 95,,, | Performed by: INTERNAL MEDICINE

## 2021-03-17 PROCEDURE — 99213 OFFICE O/P EST LOW 20 MIN: CPT | Mod: 95,,, | Performed by: INTERNAL MEDICINE

## 2021-03-31 ENCOUNTER — EXTERNAL CHRONIC CARE MANAGEMENT (OUTPATIENT)
Dept: PRIMARY CARE CLINIC | Facility: CLINIC | Age: 86
End: 2021-03-31
Payer: MEDICARE

## 2021-03-31 PROCEDURE — 99490 CHRNC CARE MGMT STAFF 1ST 20: CPT | Mod: S$PBB,,, | Performed by: FAMILY MEDICINE

## 2021-03-31 PROCEDURE — 99490 PR CHRONIC CARE MGMT, 1ST 20 MIN: ICD-10-PCS | Mod: S$PBB,,, | Performed by: FAMILY MEDICINE

## 2021-03-31 PROCEDURE — 99490 CHRNC CARE MGMT STAFF 1ST 20: CPT | Mod: PBBFAC | Performed by: FAMILY MEDICINE

## 2021-04-07 ENCOUNTER — TELEPHONE (OUTPATIENT)
Dept: INTERNAL MEDICINE | Facility: CLINIC | Age: 86
End: 2021-04-07

## 2021-04-07 DIAGNOSIS — I10 HYPERTENSION, ESSENTIAL: Primary | ICD-10-CM

## 2021-04-07 DIAGNOSIS — E03.9 HYPOTHYROIDISM (ACQUIRED): ICD-10-CM

## 2021-04-07 DIAGNOSIS — E78.5 HYPERLIPIDEMIA, UNSPECIFIED HYPERLIPIDEMIA TYPE: ICD-10-CM

## 2021-04-07 DIAGNOSIS — D50.8 IRON DEFICIENCY ANEMIA SECONDARY TO INADEQUATE DIETARY IRON INTAKE: ICD-10-CM

## 2021-04-07 DIAGNOSIS — E55.9 VITAMIN D DEFICIENCY: ICD-10-CM

## 2021-04-07 RX ORDER — AMLODIPINE BESYLATE 10 MG/1
10 TABLET ORAL DAILY
Qty: 90 TABLET | Refills: 1 | Status: SHIPPED | OUTPATIENT
Start: 2021-04-07 | End: 2021-10-04

## 2021-04-07 RX ORDER — MONTELUKAST SODIUM 10 MG/1
10 TABLET ORAL NIGHTLY
Qty: 90 TABLET | Refills: 1 | Status: SHIPPED | OUTPATIENT
Start: 2021-04-07 | End: 2021-10-04

## 2021-04-08 ENCOUNTER — LAB VISIT (OUTPATIENT)
Dept: LAB | Facility: OTHER | Age: 86
End: 2021-04-08
Payer: MEDICARE

## 2021-04-08 DIAGNOSIS — I10 HYPERTENSION, ESSENTIAL: ICD-10-CM

## 2021-04-08 DIAGNOSIS — E55.9 VITAMIN D DEFICIENCY: ICD-10-CM

## 2021-04-08 DIAGNOSIS — D50.8 IRON DEFICIENCY ANEMIA SECONDARY TO INADEQUATE DIETARY IRON INTAKE: ICD-10-CM

## 2021-04-08 DIAGNOSIS — E78.5 HYPERLIPIDEMIA, UNSPECIFIED HYPERLIPIDEMIA TYPE: ICD-10-CM

## 2021-04-08 DIAGNOSIS — E03.9 HYPOTHYROIDISM (ACQUIRED): ICD-10-CM

## 2021-04-08 LAB
ALBUMIN SERPL BCP-MCNC: 3.8 G/DL (ref 3.5–5.2)
ALP SERPL-CCNC: 59 U/L (ref 55–135)
ALT SERPL W/O P-5'-P-CCNC: 7 U/L (ref 10–44)
ANION GAP SERPL CALC-SCNC: 11 MMOL/L (ref 8–16)
AST SERPL-CCNC: 12 U/L (ref 10–40)
BASOPHILS # BLD AUTO: 0.02 K/UL (ref 0–0.2)
BASOPHILS NFR BLD: 0.2 % (ref 0–1.9)
BILIRUB SERPL-MCNC: 1.7 MG/DL (ref 0.1–1)
BUN SERPL-MCNC: 13 MG/DL (ref 8–23)
CALCIUM SERPL-MCNC: 9 MG/DL (ref 8.7–10.5)
CHLORIDE SERPL-SCNC: 101 MMOL/L (ref 95–110)
CO2 SERPL-SCNC: 28 MMOL/L (ref 23–29)
CREAT SERPL-MCNC: 1.1 MG/DL (ref 0.5–1.4)
DIFFERENTIAL METHOD: ABNORMAL
EOSINOPHIL # BLD AUTO: 0.1 K/UL (ref 0–0.5)
EOSINOPHIL NFR BLD: 1.5 % (ref 0–8)
ERYTHROCYTE [DISTWIDTH] IN BLOOD BY AUTOMATED COUNT: 15.8 % (ref 11.5–14.5)
EST. GFR  (AFRICAN AMERICAN): 54 ML/MIN/1.73 M^2
EST. GFR  (NON AFRICAN AMERICAN): 47 ML/MIN/1.73 M^2
GLUCOSE SERPL-MCNC: 99 MG/DL (ref 70–110)
HCT VFR BLD AUTO: 39.3 % (ref 37–48.5)
HGB BLD-MCNC: 12 G/DL (ref 12–16)
IMM GRANULOCYTES # BLD AUTO: 0.04 K/UL (ref 0–0.04)
IMM GRANULOCYTES NFR BLD AUTO: 0.5 % (ref 0–0.5)
LYMPHOCYTES # BLD AUTO: 3.1 K/UL (ref 1–4.8)
LYMPHOCYTES NFR BLD: 36.3 % (ref 18–48)
MCH RBC QN AUTO: 26.7 PG (ref 27–31)
MCHC RBC AUTO-ENTMCNC: 30.5 G/DL (ref 32–36)
MCV RBC AUTO: 87 FL (ref 82–98)
MONOCYTES # BLD AUTO: 1.5 K/UL (ref 0.3–1)
MONOCYTES NFR BLD: 17 % (ref 4–15)
NEUTROPHILS # BLD AUTO: 3.9 K/UL (ref 1.8–7.7)
NEUTROPHILS NFR BLD: 44.5 % (ref 38–73)
NRBC BLD-RTO: 0 /100 WBC
PLATELET # BLD AUTO: 166 K/UL (ref 150–450)
PMV BLD AUTO: 10.8 FL (ref 9.2–12.9)
POTASSIUM SERPL-SCNC: 4.2 MMOL/L (ref 3.5–5.1)
PROT SERPL-MCNC: 6.1 G/DL (ref 6–8.4)
RBC # BLD AUTO: 4.5 M/UL (ref 4–5.4)
SODIUM SERPL-SCNC: 140 MMOL/L (ref 136–145)
TSH SERPL DL<=0.005 MIU/L-ACNC: 1.55 UIU/ML (ref 0.4–4)
WBC # BLD AUTO: 8.66 K/UL (ref 3.9–12.7)

## 2021-04-08 PROCEDURE — 80053 COMPREHEN METABOLIC PANEL: CPT | Performed by: FAMILY MEDICINE

## 2021-04-08 PROCEDURE — 82306 VITAMIN D 25 HYDROXY: CPT | Performed by: FAMILY MEDICINE

## 2021-04-08 PROCEDURE — 36415 COLL VENOUS BLD VENIPUNCTURE: CPT | Performed by: FAMILY MEDICINE

## 2021-04-08 PROCEDURE — 85025 COMPLETE CBC W/AUTO DIFF WBC: CPT | Performed by: FAMILY MEDICINE

## 2021-04-08 PROCEDURE — 80061 LIPID PANEL: CPT | Performed by: FAMILY MEDICINE

## 2021-04-08 PROCEDURE — 84443 ASSAY THYROID STIM HORMONE: CPT | Performed by: FAMILY MEDICINE

## 2021-04-09 LAB
25(OH)D3+25(OH)D2 SERPL-MCNC: 19 NG/ML (ref 30–96)
CHOLEST SERPL-MCNC: 94 MG/DL (ref 120–199)
CHOLEST/HDLC SERPL: 1.7 {RATIO} (ref 2–5)
HDLC SERPL-MCNC: 54 MG/DL (ref 40–75)
HDLC SERPL: 57.4 % (ref 20–50)
LDLC SERPL CALC-MCNC: 31.8 MG/DL (ref 63–159)
NONHDLC SERPL-MCNC: 40 MG/DL
TRIGL SERPL-MCNC: 41 MG/DL (ref 30–150)

## 2021-04-12 RX ORDER — PREDNISONE 5 MG/1
10 TABLET ORAL DAILY
Qty: 60 TABLET | Refills: 2 | Status: SHIPPED | OUTPATIENT
Start: 2021-04-12 | End: 2021-06-08

## 2021-04-16 ENCOUNTER — PATIENT MESSAGE (OUTPATIENT)
Dept: RHEUMATOLOGY | Facility: CLINIC | Age: 86
End: 2021-04-16

## 2021-04-16 ENCOUNTER — PATIENT MESSAGE (OUTPATIENT)
Dept: PAIN MEDICINE | Facility: OTHER | Age: 86
End: 2021-04-16

## 2021-04-16 RX ORDER — PREDNISONE 5 MG/1
10 TABLET ORAL DAILY
Qty: 60 TABLET | Refills: 2 | Status: CANCELLED | OUTPATIENT
Start: 2021-04-16

## 2021-04-19 ENCOUNTER — HOSPITAL ENCOUNTER (OUTPATIENT)
Facility: OTHER | Age: 86
Discharge: HOME | End: 2021-04-19
Attending: ANESTHESIOLOGY | Admitting: ANESTHESIOLOGY
Payer: MEDICARE

## 2021-04-19 VITALS
TEMPERATURE: 98 F | HEIGHT: 64 IN | RESPIRATION RATE: 16 BRPM | WEIGHT: 160 LBS | DIASTOLIC BLOOD PRESSURE: 70 MMHG | BODY MASS INDEX: 27.31 KG/M2 | OXYGEN SATURATION: 100 % | SYSTOLIC BLOOD PRESSURE: 123 MMHG | HEART RATE: 80 BPM

## 2021-04-19 DIAGNOSIS — M16.9 OSTEOARTHRITIS OF HIP, UNSPECIFIED LATERALITY, UNSPECIFIED OSTEOARTHRITIS TYPE: Primary | ICD-10-CM

## 2021-04-19 DIAGNOSIS — G89.29 CHRONIC PAIN: ICD-10-CM

## 2021-04-19 PROCEDURE — 64450 PR NERVE BLOCK INJ, ANES/STEROID, OTHER PERIPHERAL: ICD-10-PCS | Mod: LT,,, | Performed by: ANESTHESIOLOGY

## 2021-04-19 PROCEDURE — 64447 NJX AA&/STRD FEMORAL NRV IMG: CPT | Mod: LT | Performed by: ANESTHESIOLOGY

## 2021-04-19 PROCEDURE — 64450 NJX AA&/STRD OTHER PN/BRANCH: CPT | Mod: LT | Performed by: ANESTHESIOLOGY

## 2021-04-19 PROCEDURE — 64450 NJX AA&/STRD OTHER PN/BRANCH: CPT | Mod: LT,,, | Performed by: ANESTHESIOLOGY

## 2021-04-19 PROCEDURE — 25000003 PHARM REV CODE 250: Performed by: ANESTHESIOLOGY

## 2021-04-19 RX ORDER — BUPIVACAINE HYDROCHLORIDE 2.5 MG/ML
INJECTION, SOLUTION EPIDURAL; INFILTRATION; INTRACAUDAL
Status: DISCONTINUED | OUTPATIENT
Start: 2021-04-19 | End: 2021-04-19 | Stop reason: HOSPADM

## 2021-04-19 RX ORDER — ALPRAZOLAM 0.5 MG/1
0.5 TABLET ORAL ONCE
Status: COMPLETED | OUTPATIENT
Start: 2021-04-19 | End: 2021-04-19

## 2021-04-19 RX ORDER — SODIUM CHLORIDE 9 MG/ML
INJECTION, SOLUTION INTRAVENOUS CONTINUOUS
Status: DISCONTINUED | OUTPATIENT
Start: 2021-04-20 | End: 2021-04-19 | Stop reason: HOSPADM

## 2021-04-19 RX ORDER — LIDOCAINE HYDROCHLORIDE 10 MG/ML
INJECTION INFILTRATION; PERINEURAL
Status: DISCONTINUED | OUTPATIENT
Start: 2021-04-19 | End: 2021-04-19 | Stop reason: HOSPADM

## 2021-04-19 RX ADMIN — ALPRAZOLAM 0.5 MG: 0.5 TABLET ORAL at 11:04

## 2021-04-22 ENCOUNTER — TELEPHONE (OUTPATIENT)
Dept: INTERNAL MEDICINE | Facility: CLINIC | Age: 86
End: 2021-04-22

## 2021-04-23 ENCOUNTER — TELEPHONE (OUTPATIENT)
Dept: PAIN MEDICINE | Facility: CLINIC | Age: 86
End: 2021-04-23

## 2021-04-30 ENCOUNTER — EXTERNAL CHRONIC CARE MANAGEMENT (OUTPATIENT)
Dept: PRIMARY CARE CLINIC | Facility: CLINIC | Age: 86
End: 2021-04-30
Payer: MEDICARE

## 2021-04-30 PROCEDURE — 99490 CHRNC CARE MGMT STAFF 1ST 20: CPT | Mod: S$PBB,,, | Performed by: FAMILY MEDICINE

## 2021-04-30 PROCEDURE — 99490 CHRNC CARE MGMT STAFF 1ST 20: CPT | Mod: PBBFAC | Performed by: FAMILY MEDICINE

## 2021-04-30 PROCEDURE — 99490 PR CHRONIC CARE MGMT, 1ST 20 MIN: ICD-10-PCS | Mod: S$PBB,,, | Performed by: FAMILY MEDICINE

## 2021-05-06 ENCOUNTER — PATIENT MESSAGE (OUTPATIENT)
Dept: ENDOCRINOLOGY | Facility: CLINIC | Age: 86
End: 2021-05-06

## 2021-05-10 ENCOUNTER — OFFICE VISIT (OUTPATIENT)
Dept: ENDOCRINOLOGY | Facility: CLINIC | Age: 86
End: 2021-05-10
Payer: MEDICARE

## 2021-05-10 ENCOUNTER — HOSPITAL ENCOUNTER (OUTPATIENT)
Dept: RADIOLOGY | Facility: OTHER | Age: 86
Discharge: HOME | End: 2021-05-10
Attending: INTERNAL MEDICINE
Payer: MEDICARE

## 2021-05-10 VITALS
SYSTOLIC BLOOD PRESSURE: 116 MMHG | OXYGEN SATURATION: 99 % | DIASTOLIC BLOOD PRESSURE: 57 MMHG | WEIGHT: 163.81 LBS | BODY MASS INDEX: 27.96 KG/M2 | HEART RATE: 84 BPM | HEIGHT: 64 IN

## 2021-05-10 DIAGNOSIS — E04.2 MULTINODULAR GOITER: Primary | ICD-10-CM

## 2021-05-10 DIAGNOSIS — E55.9 VITAMIN D INSUFFICIENCY: ICD-10-CM

## 2021-05-10 DIAGNOSIS — E04.2 MULTINODULAR GOITER: ICD-10-CM

## 2021-05-10 DIAGNOSIS — R76.8 THYROID ANTIBODY POSITIVE: ICD-10-CM

## 2021-05-10 PROBLEM — E66.811 CLASS 1 OBESITY DUE TO EXCESS CALORIES WITH SERIOUS COMORBIDITY IN ADULT: Status: RESOLVED | Noted: 2020-01-13 | Resolved: 2021-05-10

## 2021-05-10 PROBLEM — E66.09 CLASS 1 OBESITY DUE TO EXCESS CALORIES WITH SERIOUS COMORBIDITY IN ADULT: Status: RESOLVED | Noted: 2020-01-13 | Resolved: 2021-05-10

## 2021-05-10 PROCEDURE — 99214 OFFICE O/P EST MOD 30 MIN: CPT | Mod: S$GLB,,, | Performed by: INTERNAL MEDICINE

## 2021-05-10 PROCEDURE — 76536 US EXAM OF HEAD AND NECK: CPT | Mod: TC

## 2021-05-10 PROCEDURE — 76536 US SOFT TISSUE HEAD NECK THYROID: ICD-10-PCS | Mod: 26,,, | Performed by: RADIOLOGY

## 2021-05-10 PROCEDURE — 99214 PR OFFICE/OUTPT VISIT, EST, LEVL IV, 30-39 MIN: ICD-10-PCS | Mod: S$GLB,,, | Performed by: INTERNAL MEDICINE

## 2021-05-10 PROCEDURE — 76536 US EXAM OF HEAD AND NECK: CPT | Mod: 26,,, | Performed by: RADIOLOGY

## 2021-05-11 ENCOUNTER — CARE AT HOME (OUTPATIENT)
Dept: HOME HEALTH SERVICES | Facility: CLINIC | Age: 86
End: 2021-05-11
Payer: MEDICARE

## 2021-05-11 ENCOUNTER — OFFICE VISIT (OUTPATIENT)
Dept: CARDIOLOGY | Facility: CLINIC | Age: 86
End: 2021-05-11
Payer: MEDICARE

## 2021-05-11 VITALS
HEIGHT: 64 IN | BODY MASS INDEX: 27.83 KG/M2 | SYSTOLIC BLOOD PRESSURE: 120 MMHG | TEMPERATURE: 98 F | RESPIRATION RATE: 18 BRPM | OXYGEN SATURATION: 99 % | DIASTOLIC BLOOD PRESSURE: 62 MMHG | HEART RATE: 84 BPM | WEIGHT: 163 LBS

## 2021-05-11 DIAGNOSIS — I67.89 CEREBRAL MICROVASCULAR DISEASE: ICD-10-CM

## 2021-05-11 DIAGNOSIS — I27.23 PULMONARY HYPERTENSION DUE TO INTERSTITIAL LUNG DISEASE: ICD-10-CM

## 2021-05-11 DIAGNOSIS — I48.0 AF (PAROXYSMAL ATRIAL FIBRILLATION): ICD-10-CM

## 2021-05-11 DIAGNOSIS — J84.116 CRYPTOGENIC ORGANIZING PNEUMONIA: ICD-10-CM

## 2021-05-11 DIAGNOSIS — I70.0 AORTIC ATHEROSCLEROSIS: ICD-10-CM

## 2021-05-11 DIAGNOSIS — I10 HYPERTENSION, ESSENTIAL: Primary | ICD-10-CM

## 2021-05-11 DIAGNOSIS — N18.30 CHRONIC RENAL FAILURE SYNDROME, STAGE 3 (MODERATE): Primary | ICD-10-CM

## 2021-05-11 DIAGNOSIS — J84.9 PULMONARY HYPERTENSION DUE TO INTERSTITIAL LUNG DISEASE: ICD-10-CM

## 2021-05-11 PROCEDURE — 99350 HOME/RES VST EST HIGH MDM 60: CPT | Mod: S$GLB,,, | Performed by: NURSE PRACTITIONER

## 2021-05-11 PROCEDURE — 99214 OFFICE O/P EST MOD 30 MIN: CPT | Mod: 95,,, | Performed by: INTERNAL MEDICINE

## 2021-05-11 PROCEDURE — 99497 ADVNCD CARE PLAN 30 MIN: CPT | Mod: S$GLB,,, | Performed by: NURSE PRACTITIONER

## 2021-05-11 PROCEDURE — 99350 PR HOME VISIT,ESTAB PATIENT,LEVEL IV: ICD-10-PCS | Mod: S$GLB,,, | Performed by: NURSE PRACTITIONER

## 2021-05-11 PROCEDURE — 99497 PR ADVNCD CARE PLAN 30 MIN: ICD-10-PCS | Mod: S$GLB,,, | Performed by: NURSE PRACTITIONER

## 2021-05-11 PROCEDURE — 99214 PR OFFICE/OUTPT VISIT, EST, LEVL IV, 30-39 MIN: ICD-10-PCS | Mod: 95,,, | Performed by: INTERNAL MEDICINE

## 2021-05-11 RX ORDER — ATORVASTATIN CALCIUM 40 MG/1
40 TABLET, FILM COATED ORAL DAILY
Qty: 90 TABLET | Refills: 3 | Status: SHIPPED | OUTPATIENT
Start: 2021-05-11 | End: 2021-08-20 | Stop reason: SDUPTHER

## 2021-05-11 RX ORDER — FUROSEMIDE 40 MG/1
40 TABLET ORAL DAILY
Qty: 90 TABLET | Refills: 3 | Status: SHIPPED | OUTPATIENT
Start: 2021-05-11 | End: 2021-08-20 | Stop reason: SDUPTHER

## 2021-05-12 ENCOUNTER — TELEPHONE (OUTPATIENT)
Dept: OTOLARYNGOLOGY | Facility: CLINIC | Age: 86
End: 2021-05-12

## 2021-05-13 ENCOUNTER — TELEPHONE (OUTPATIENT)
Dept: RHEUMATOLOGY | Facility: CLINIC | Age: 86
End: 2021-05-13

## 2021-05-19 ENCOUNTER — TELEPHONE (OUTPATIENT)
Dept: SPEECH THERAPY | Facility: HOSPITAL | Age: 86
End: 2021-05-19

## 2021-05-19 ENCOUNTER — OFFICE VISIT (OUTPATIENT)
Dept: OTOLARYNGOLOGY | Facility: CLINIC | Age: 86
End: 2021-05-19
Payer: MEDICARE

## 2021-05-19 ENCOUNTER — LAB VISIT (OUTPATIENT)
Dept: LAB | Facility: HOSPITAL | Age: 86
End: 2021-05-19
Attending: OTOLARYNGOLOGY
Payer: MEDICARE

## 2021-05-19 VITALS
SYSTOLIC BLOOD PRESSURE: 124 MMHG | BODY MASS INDEX: 28.08 KG/M2 | WEIGHT: 163.56 LBS | HEART RATE: 87 BPM | DIASTOLIC BLOOD PRESSURE: 70 MMHG

## 2021-05-19 DIAGNOSIS — E04.2 MULTINODULAR GOITER: ICD-10-CM

## 2021-05-19 DIAGNOSIS — E04.2 MULTINODULAR GOITER: Primary | ICD-10-CM

## 2021-05-19 DIAGNOSIS — R49.0 DYSPHONIA: ICD-10-CM

## 2021-05-19 DIAGNOSIS — R13.12 DYSPHAGIA, OROPHARYNGEAL PHASE: ICD-10-CM

## 2021-05-19 LAB
CREAT SERPL-MCNC: 1.4 MG/DL (ref 0.5–1.4)
EST. GFR  (AFRICAN AMERICAN): 40.1 ML/MIN/1.73 M^2
EST. GFR  (NON AFRICAN AMERICAN): 34.8 ML/MIN/1.73 M^2

## 2021-05-19 PROCEDURE — 99214 OFFICE O/P EST MOD 30 MIN: CPT | Mod: S$PBB,,, | Performed by: OTOLARYNGOLOGY

## 2021-05-19 PROCEDURE — 99999 PR PBB SHADOW E&M-EST. PATIENT-LVL V: CPT | Mod: PBBFAC,,, | Performed by: OTOLARYNGOLOGY

## 2021-05-19 PROCEDURE — 99215 OFFICE O/P EST HI 40 MIN: CPT | Mod: PBBFAC | Performed by: OTOLARYNGOLOGY

## 2021-05-19 PROCEDURE — 99214 PR OFFICE/OUTPT VISIT, EST, LEVL IV, 30-39 MIN: ICD-10-PCS | Mod: S$PBB,,, | Performed by: OTOLARYNGOLOGY

## 2021-05-19 PROCEDURE — 99999 PR PBB SHADOW E&M-EST. PATIENT-LVL V: ICD-10-PCS | Mod: PBBFAC,,, | Performed by: OTOLARYNGOLOGY

## 2021-05-19 PROCEDURE — 82565 ASSAY OF CREATININE: CPT | Performed by: OTOLARYNGOLOGY

## 2021-05-19 PROCEDURE — 36415 COLL VENOUS BLD VENIPUNCTURE: CPT | Performed by: OTOLARYNGOLOGY

## 2021-05-20 ENCOUNTER — HOSPITAL ENCOUNTER (EMERGENCY)
Facility: HOSPITAL | Age: 86
Discharge: HOME | End: 2021-05-20
Attending: EMERGENCY MEDICINE
Payer: MEDICARE

## 2021-05-20 VITALS
RESPIRATION RATE: 18 BRPM | WEIGHT: 158 LBS | OXYGEN SATURATION: 96 % | DIASTOLIC BLOOD PRESSURE: 63 MMHG | SYSTOLIC BLOOD PRESSURE: 147 MMHG | HEART RATE: 80 BPM | TEMPERATURE: 98 F | BODY MASS INDEX: 27.12 KG/M2

## 2021-05-20 DIAGNOSIS — R51.9 NONINTRACTABLE EPISODIC HEADACHE, UNSPECIFIED HEADACHE TYPE: Primary | ICD-10-CM

## 2021-05-20 LAB
ALBUMIN SERPL BCP-MCNC: 3.6 G/DL (ref 3.5–5.2)
ALP SERPL-CCNC: 59 U/L (ref 55–135)
ALT SERPL W/O P-5'-P-CCNC: 5 U/L (ref 10–44)
ANION GAP SERPL CALC-SCNC: 9 MMOL/L (ref 8–16)
AST SERPL-CCNC: 10 U/L (ref 10–40)
BASOPHILS # BLD AUTO: 0.03 K/UL (ref 0–0.2)
BASOPHILS NFR BLD: 0.4 % (ref 0–1.9)
BILIRUB SERPL-MCNC: 1.2 MG/DL (ref 0.1–1)
BILIRUB UR QL STRIP: NEGATIVE
BUN SERPL-MCNC: 14 MG/DL (ref 8–23)
CALCIUM SERPL-MCNC: 9 MG/DL (ref 8.7–10.5)
CHLORIDE SERPL-SCNC: 96 MMOL/L (ref 95–110)
CLARITY UR REFRACT.AUTO: CLEAR
CO2 SERPL-SCNC: 26 MMOL/L (ref 23–29)
COLOR UR AUTO: NORMAL
CREAT SERPL-MCNC: 1.2 MG/DL (ref 0.5–1.4)
DIFFERENTIAL METHOD: ABNORMAL
EOSINOPHIL # BLD AUTO: 0.1 K/UL (ref 0–0.5)
EOSINOPHIL NFR BLD: 1.7 % (ref 0–8)
ERYTHROCYTE [DISTWIDTH] IN BLOOD BY AUTOMATED COUNT: 15.8 % (ref 11.5–14.5)
EST. GFR  (AFRICAN AMERICAN): 48.3 ML/MIN/1.73 M^2
EST. GFR  (NON AFRICAN AMERICAN): 41.9 ML/MIN/1.73 M^2
GLUCOSE SERPL-MCNC: 135 MG/DL (ref 70–110)
GLUCOSE UR QL STRIP: NEGATIVE
HCT VFR BLD AUTO: 37.2 % (ref 37–48.5)
HGB BLD-MCNC: 11.7 G/DL (ref 12–16)
HGB UR QL STRIP: NEGATIVE
IMM GRANULOCYTES # BLD AUTO: 0.04 K/UL (ref 0–0.04)
IMM GRANULOCYTES NFR BLD AUTO: 0.5 % (ref 0–0.5)
KETONES UR QL STRIP: NEGATIVE
LEUKOCYTE ESTERASE UR QL STRIP: NEGATIVE
LYMPHOCYTES # BLD AUTO: 2.4 K/UL (ref 1–4.8)
LYMPHOCYTES NFR BLD: 28.7 % (ref 18–48)
MCH RBC QN AUTO: 27.7 PG (ref 27–31)
MCHC RBC AUTO-ENTMCNC: 31.5 G/DL (ref 32–36)
MCV RBC AUTO: 88 FL (ref 82–98)
MONOCYTES # BLD AUTO: 1.8 K/UL (ref 0.3–1)
MONOCYTES NFR BLD: 21.5 % (ref 4–15)
NEUTROPHILS # BLD AUTO: 3.9 K/UL (ref 1.8–7.7)
NEUTROPHILS NFR BLD: 47.2 % (ref 38–73)
NITRITE UR QL STRIP: NEGATIVE
NRBC BLD-RTO: 0 /100 WBC
PH UR STRIP: 7 [PH] (ref 5–8)
PLATELET # BLD AUTO: 170 K/UL (ref 150–450)
PMV BLD AUTO: 10.8 FL (ref 9.2–12.9)
POTASSIUM SERPL-SCNC: 4 MMOL/L (ref 3.5–5.1)
PROT SERPL-MCNC: 6.1 G/DL (ref 6–8.4)
PROT UR QL STRIP: NEGATIVE
RBC # BLD AUTO: 4.22 M/UL (ref 4–5.4)
SODIUM SERPL-SCNC: 131 MMOL/L (ref 136–145)
SP GR UR STRIP: 1 (ref 1–1.03)
URN SPEC COLLECT METH UR: NORMAL
WBC # BLD AUTO: 8.26 K/UL (ref 3.9–12.7)

## 2021-05-20 PROCEDURE — 99284 PR EMERGENCY DEPT VISIT,LEVEL IV: ICD-10-PCS | Mod: ,,, | Performed by: EMERGENCY MEDICINE

## 2021-05-20 PROCEDURE — 25000003 PHARM REV CODE 250: Performed by: EMERGENCY MEDICINE

## 2021-05-20 PROCEDURE — 80053 COMPREHEN METABOLIC PANEL: CPT | Performed by: EMERGENCY MEDICINE

## 2021-05-20 PROCEDURE — 96375 TX/PRO/DX INJ NEW DRUG ADDON: CPT

## 2021-05-20 PROCEDURE — 81003 URINALYSIS AUTO W/O SCOPE: CPT | Performed by: EMERGENCY MEDICINE

## 2021-05-20 PROCEDURE — 63600175 PHARM REV CODE 636 W HCPCS: Performed by: EMERGENCY MEDICINE

## 2021-05-20 PROCEDURE — 99284 EMERGENCY DEPT VISIT MOD MDM: CPT | Mod: ,,, | Performed by: EMERGENCY MEDICINE

## 2021-05-20 PROCEDURE — 85025 COMPLETE CBC W/AUTO DIFF WBC: CPT | Performed by: EMERGENCY MEDICINE

## 2021-05-20 PROCEDURE — 96374 THER/PROPH/DIAG INJ IV PUSH: CPT

## 2021-05-20 PROCEDURE — 99285 EMERGENCY DEPT VISIT HI MDM: CPT | Mod: 25

## 2021-05-20 RX ORDER — ACETAMINOPHEN 500 MG
500 TABLET ORAL
Qty: 42 TABLET | Refills: 0 | Status: SHIPPED | OUTPATIENT
Start: 2021-05-20 | End: 2021-05-27

## 2021-05-20 RX ORDER — DIPHENHYDRAMINE HYDROCHLORIDE 50 MG/ML
25 INJECTION INTRAMUSCULAR; INTRAVENOUS
Status: COMPLETED | OUTPATIENT
Start: 2021-05-20 | End: 2021-05-20

## 2021-05-20 RX ORDER — LIDOCAINE 50 MG/G
1 PATCH TOPICAL
Status: DISCONTINUED | OUTPATIENT
Start: 2021-05-20 | End: 2021-05-20 | Stop reason: HOSPADM

## 2021-05-20 RX ORDER — TIZANIDINE 4 MG/1
4 TABLET ORAL ONCE
Status: DISCONTINUED | OUTPATIENT
Start: 2021-05-20 | End: 2021-05-20 | Stop reason: HOSPADM

## 2021-05-20 RX ORDER — DEXAMETHASONE SODIUM PHOSPHATE 4 MG/ML
12 INJECTION, SOLUTION INTRA-ARTICULAR; INTRALESIONAL; INTRAMUSCULAR; INTRAVENOUS; SOFT TISSUE
Status: COMPLETED | OUTPATIENT
Start: 2021-05-20 | End: 2021-05-20

## 2021-05-20 RX ORDER — ACETAMINOPHEN 500 MG
1000 TABLET ORAL
Status: COMPLETED | OUTPATIENT
Start: 2021-05-20 | End: 2021-05-20

## 2021-05-20 RX ORDER — LIDOCAINE 50 MG/G
1 PATCH TOPICAL DAILY
Qty: 7 PATCH | Refills: 0 | Status: ON HOLD | OUTPATIENT
Start: 2021-05-20 | End: 2022-01-31 | Stop reason: HOSPADM

## 2021-05-20 RX ORDER — PROCHLORPERAZINE EDISYLATE 5 MG/ML
10 INJECTION INTRAMUSCULAR; INTRAVENOUS
Status: COMPLETED | OUTPATIENT
Start: 2021-05-20 | End: 2021-05-20

## 2021-05-20 RX ADMIN — DIPHENHYDRAMINE HYDROCHLORIDE 25 MG: 50 INJECTION, SOLUTION INTRAMUSCULAR; INTRAVENOUS at 08:05

## 2021-05-20 RX ADMIN — ACETAMINOPHEN 1000 MG: 500 TABLET, FILM COATED ORAL at 08:05

## 2021-05-20 RX ADMIN — DEXAMETHASONE SODIUM PHOSPHATE 12 MG: 4 INJECTION INTRA-ARTICULAR; INTRALESIONAL; INTRAMUSCULAR; INTRAVENOUS; SOFT TISSUE at 08:05

## 2021-05-20 RX ADMIN — PROCHLORPERAZINE EDISYLATE 10 MG: 5 INJECTION INTRAMUSCULAR; INTRAVENOUS at 08:05

## 2021-05-20 RX ADMIN — LIDOCAINE 1 PATCH: 50 PATCH TOPICAL at 08:05

## 2021-05-21 ENCOUNTER — TELEPHONE (OUTPATIENT)
Dept: SPEECH THERAPY | Facility: HOSPITAL | Age: 86
End: 2021-05-21

## 2021-05-28 ENCOUNTER — OFFICE VISIT (OUTPATIENT)
Dept: SPINE | Facility: CLINIC | Age: 86
End: 2021-05-28
Attending: PHYSICAL MEDICINE & REHABILITATION
Payer: MEDICARE

## 2021-05-28 ENCOUNTER — HOSPITAL ENCOUNTER (OUTPATIENT)
Dept: RADIOLOGY | Facility: OTHER | Age: 86
Discharge: HOME | End: 2021-05-28
Attending: OTOLARYNGOLOGY
Payer: MEDICARE

## 2021-05-28 VITALS
HEIGHT: 64 IN | DIASTOLIC BLOOD PRESSURE: 54 MMHG | HEART RATE: 92 BPM | SYSTOLIC BLOOD PRESSURE: 92 MMHG | WEIGHT: 158.06 LBS | BODY MASS INDEX: 26.98 KG/M2

## 2021-05-28 DIAGNOSIS — M48.00 SPINAL STENOSIS, UNSPECIFIED SPINAL REGION: Primary | ICD-10-CM

## 2021-05-28 DIAGNOSIS — E04.2 MULTINODULAR GOITER: ICD-10-CM

## 2021-05-28 DIAGNOSIS — M54.2 NECK PAIN: ICD-10-CM

## 2021-05-28 DIAGNOSIS — R53.81 PHYSICAL DECONDITIONING: ICD-10-CM

## 2021-05-28 DIAGNOSIS — M47.816 SPONDYLOSIS OF LUMBAR REGION WITHOUT MYELOPATHY OR RADICULOPATHY: ICD-10-CM

## 2021-05-28 DIAGNOSIS — M47.812 SPONDYLOSIS OF CERVICAL REGION WITHOUT MYELOPATHY OR RADICULOPATHY: ICD-10-CM

## 2021-05-28 DIAGNOSIS — M16.12 PRIMARY OSTEOARTHRITIS OF LEFT HIP: ICD-10-CM

## 2021-05-28 DIAGNOSIS — G24.3 ISOLATED CERVICAL DYSTONIA: ICD-10-CM

## 2021-05-28 DIAGNOSIS — M62.838 MUSCLE SPASM: ICD-10-CM

## 2021-05-28 PROCEDURE — 25500020 PHARM REV CODE 255: Performed by: OTOLARYNGOLOGY

## 2021-05-28 PROCEDURE — 70491 CT NECK CHEST WITH CONTRAST (XPD): ICD-10-PCS | Mod: 26,,, | Performed by: RADIOLOGY

## 2021-05-28 PROCEDURE — 71260 CT NECK CHEST WITH CONTRAST (XPD): ICD-10-PCS | Mod: 26,,, | Performed by: RADIOLOGY

## 2021-05-28 PROCEDURE — 99999 PR PBB SHADOW E&M-EST. PATIENT-LVL V: CPT | Mod: PBBFAC,,, | Performed by: PHYSICAL MEDICINE & REHABILITATION

## 2021-05-28 PROCEDURE — 71260 CT THORAX DX C+: CPT | Mod: 26,,, | Performed by: RADIOLOGY

## 2021-05-28 PROCEDURE — 99214 PR OFFICE/OUTPT VISIT, EST, LEVL IV, 30-39 MIN: ICD-10-PCS | Mod: S$PBB,,, | Performed by: PHYSICAL MEDICINE & REHABILITATION

## 2021-05-28 PROCEDURE — 99999 PR PBB SHADOW E&M-EST. PATIENT-LVL V: ICD-10-PCS | Mod: PBBFAC,,, | Performed by: PHYSICAL MEDICINE & REHABILITATION

## 2021-05-28 PROCEDURE — 70491 CT SOFT TISSUE NECK W/DYE: CPT | Mod: TC

## 2021-05-28 PROCEDURE — 70491 CT SOFT TISSUE NECK W/DYE: CPT | Mod: 26,,, | Performed by: RADIOLOGY

## 2021-05-28 PROCEDURE — 99215 OFFICE O/P EST HI 40 MIN: CPT | Mod: PBBFAC | Performed by: PHYSICAL MEDICINE & REHABILITATION

## 2021-05-28 PROCEDURE — 99214 OFFICE O/P EST MOD 30 MIN: CPT | Mod: S$PBB,,, | Performed by: PHYSICAL MEDICINE & REHABILITATION

## 2021-05-28 RX ADMIN — IOHEXOL 150 ML: 350 INJECTION, SOLUTION INTRAVENOUS at 12:05

## 2021-05-31 ENCOUNTER — EXTERNAL CHRONIC CARE MANAGEMENT (OUTPATIENT)
Dept: PRIMARY CARE CLINIC | Facility: CLINIC | Age: 86
End: 2021-05-31
Payer: MEDICARE

## 2021-05-31 PROCEDURE — 99490 CHRNC CARE MGMT STAFF 1ST 20: CPT | Mod: S$PBB,,, | Performed by: FAMILY MEDICINE

## 2021-05-31 PROCEDURE — 99490 CHRNC CARE MGMT STAFF 1ST 20: CPT | Mod: PBBFAC | Performed by: FAMILY MEDICINE

## 2021-05-31 PROCEDURE — 99490 PR CHRONIC CARE MGMT, 1ST 20 MIN: ICD-10-PCS | Mod: S$PBB,,, | Performed by: FAMILY MEDICINE

## 2021-06-02 ENCOUNTER — TELEPHONE (OUTPATIENT)
Dept: OTOLARYNGOLOGY | Facility: CLINIC | Age: 86
End: 2021-06-02

## 2021-06-04 ENCOUNTER — HOSPITAL ENCOUNTER (OUTPATIENT)
Dept: RADIOLOGY | Facility: OTHER | Age: 86
Discharge: HOME | End: 2021-06-04
Attending: OTOLARYNGOLOGY
Payer: MEDICARE

## 2021-06-04 DIAGNOSIS — R49.0 DYSPHONIA: ICD-10-CM

## 2021-06-04 DIAGNOSIS — R13.12 DYSPHAGIA, OROPHARYNGEAL PHASE: ICD-10-CM

## 2021-06-04 DIAGNOSIS — E04.2 MULTINODULAR GOITER: ICD-10-CM

## 2021-06-04 PROCEDURE — 92611 MOTION FLUOROSCOPY/SWALLOW: CPT

## 2021-06-04 PROCEDURE — 74230 X-RAY XM SWLNG FUNCJ C+: CPT | Mod: 26,,, | Performed by: RADIOLOGY

## 2021-06-04 PROCEDURE — A9698 NON-RAD CONTRAST MATERIALNOC: HCPCS | Performed by: OTOLARYNGOLOGY

## 2021-06-04 PROCEDURE — 74230 X-RAY XM SWLNG FUNCJ C+: CPT | Mod: TC

## 2021-06-04 PROCEDURE — 74230 FL MODIFIED BARIUM SWALLOW SPEECH STUDY: ICD-10-PCS | Mod: 26,,, | Performed by: RADIOLOGY

## 2021-06-04 PROCEDURE — 25500020 PHARM REV CODE 255: Performed by: OTOLARYNGOLOGY

## 2021-06-04 RX ADMIN — BARIUM SULFATE 50 ML: 0.81 POWDER, FOR SUSPENSION ORAL at 10:06

## 2021-06-08 ENCOUNTER — LAB VISIT (OUTPATIENT)
Dept: LAB | Facility: HOSPITAL | Age: 86
End: 2021-06-08
Attending: INTERNAL MEDICINE
Payer: MEDICARE

## 2021-06-08 ENCOUNTER — OFFICE VISIT (OUTPATIENT)
Dept: RHEUMATOLOGY | Facility: CLINIC | Age: 86
End: 2021-06-08
Payer: MEDICARE

## 2021-06-08 VITALS
HEIGHT: 64 IN | SYSTOLIC BLOOD PRESSURE: 116 MMHG | BODY MASS INDEX: 27.13 KG/M2 | HEART RATE: 93 BPM | DIASTOLIC BLOOD PRESSURE: 71 MMHG

## 2021-06-08 DIAGNOSIS — J84.116 CRYPTOGENIC ORGANIZING PNEUMONIA: ICD-10-CM

## 2021-06-08 DIAGNOSIS — M05.79 SEROPOSITIVE RHEUMATOID ARTHRITIS OF MULTIPLE SITES: ICD-10-CM

## 2021-06-08 DIAGNOSIS — Z79.899 HIGH RISK MEDICATION USE: Chronic | ICD-10-CM

## 2021-06-08 DIAGNOSIS — M05.79 SEROPOSITIVE RHEUMATOID ARTHRITIS OF MULTIPLE SITES: Primary | ICD-10-CM

## 2021-06-08 LAB
CRP SERPL-MCNC: 4.2 MG/L (ref 0–8.2)
ERYTHROCYTE [SEDIMENTATION RATE] IN BLOOD BY WESTERGREN METHOD: 22 MM/HR (ref 0–36)
URATE SERPL-MCNC: 5.5 MG/DL (ref 2.4–5.7)

## 2021-06-08 PROCEDURE — 36415 COLL VENOUS BLD VENIPUNCTURE: CPT | Performed by: INTERNAL MEDICINE

## 2021-06-08 PROCEDURE — 86140 C-REACTIVE PROTEIN: CPT | Performed by: INTERNAL MEDICINE

## 2021-06-08 PROCEDURE — 84550 ASSAY OF BLOOD/URIC ACID: CPT | Performed by: INTERNAL MEDICINE

## 2021-06-08 PROCEDURE — 99999 PR PBB SHADOW E&M-EST. PATIENT-LVL IV: CPT | Mod: PBBFAC,,, | Performed by: INTERNAL MEDICINE

## 2021-06-08 PROCEDURE — 99214 OFFICE O/P EST MOD 30 MIN: CPT | Mod: PBBFAC | Performed by: INTERNAL MEDICINE

## 2021-06-08 PROCEDURE — 99214 OFFICE O/P EST MOD 30 MIN: CPT | Mod: S$PBB,,, | Performed by: INTERNAL MEDICINE

## 2021-06-08 PROCEDURE — 99214 PR OFFICE/OUTPT VISIT, EST, LEVL IV, 30-39 MIN: ICD-10-PCS | Mod: S$PBB,,, | Performed by: INTERNAL MEDICINE

## 2021-06-08 PROCEDURE — 99999 PR PBB SHADOW E&M-EST. PATIENT-LVL IV: ICD-10-PCS | Mod: PBBFAC,,, | Performed by: INTERNAL MEDICINE

## 2021-06-08 PROCEDURE — 85652 RBC SED RATE AUTOMATED: CPT | Performed by: INTERNAL MEDICINE

## 2021-06-08 RX ORDER — PREDNISONE 5 MG/1
7.5 TABLET ORAL DAILY
Qty: 60 TABLET | Refills: 2 | Status: SHIPPED | OUTPATIENT
Start: 2021-06-08 | End: 2021-06-08 | Stop reason: SDUPTHER

## 2021-06-08 RX ORDER — PREDNISONE 5 MG/1
7.5 TABLET ORAL DAILY
Qty: 60 TABLET | Refills: 2 | Status: SHIPPED | OUTPATIENT
Start: 2021-06-08 | End: 2021-07-30

## 2021-06-08 RX ORDER — MYCOPHENOLATE MOFETIL 500 MG/1
500 TABLET ORAL 2 TIMES DAILY
Qty: 180 TABLET | Refills: 0 | Status: ON HOLD | OUTPATIENT
Start: 2021-06-08 | End: 2022-01-31 | Stop reason: SDUPTHER

## 2021-06-09 ENCOUNTER — PATIENT MESSAGE (OUTPATIENT)
Dept: RHEUMATOLOGY | Facility: CLINIC | Age: 86
End: 2021-06-09

## 2021-06-09 PROBLEM — Z79.899 HIGH RISK MEDICATION USE: Chronic | Status: ACTIVE | Noted: 2021-06-09

## 2021-06-09 ASSESSMENT — ROUTINE ASSESSMENT OF PATIENT INDEX DATA (RAPID3)
FATIGUE SCORE: 2.5
TOTAL RAPID3 SCORE: 2.72
MDHAQ FUNCTION SCORE: 0.8
PAIN SCORE: 3
PATIENT GLOBAL ASSESSMENT SCORE: 2.5
PSYCHOLOGICAL DISTRESS SCORE: 3.3

## 2021-06-10 ENCOUNTER — PATIENT MESSAGE (OUTPATIENT)
Dept: GASTROENTEROLOGY | Facility: CLINIC | Age: 86
End: 2021-06-10

## 2021-06-10 RX ORDER — OMEPRAZOLE 20 MG/1
20 CAPSULE, DELAYED RELEASE ORAL DAILY
Qty: 90 CAPSULE | Refills: 3 | Status: SHIPPED | OUTPATIENT
Start: 2021-06-10 | End: 2022-06-17

## 2021-06-22 ENCOUNTER — CARE AT HOME (OUTPATIENT)
Dept: HOME HEALTH SERVICES | Facility: CLINIC | Age: 86
End: 2021-06-22
Payer: MEDICARE

## 2021-06-22 VITALS
RESPIRATION RATE: 18 BRPM | DIASTOLIC BLOOD PRESSURE: 71 MMHG | TEMPERATURE: 98 F | OXYGEN SATURATION: 98 % | HEIGHT: 64 IN | HEART RATE: 75 BPM | SYSTOLIC BLOOD PRESSURE: 126 MMHG | BODY MASS INDEX: 26.98 KG/M2 | WEIGHT: 158 LBS

## 2021-06-22 DIAGNOSIS — N18.30 CHRONIC RENAL FAILURE SYNDROME, STAGE 3 (MODERATE): Primary | ICD-10-CM

## 2021-06-22 DIAGNOSIS — Z86.73 HISTORY OF STROKE: ICD-10-CM

## 2021-06-22 PROCEDURE — 99350 PR HOME VISIT,ESTAB PATIENT,LEVEL IV: ICD-10-PCS | Mod: S$GLB,,, | Performed by: NURSE PRACTITIONER

## 2021-06-22 PROCEDURE — 99350 HOME/RES VST EST HIGH MDM 60: CPT | Mod: S$GLB,,, | Performed by: NURSE PRACTITIONER

## 2021-06-25 ENCOUNTER — OFFICE VISIT (OUTPATIENT)
Dept: OTOLARYNGOLOGY | Facility: CLINIC | Age: 86
End: 2021-06-25
Payer: MEDICARE

## 2021-06-25 VITALS — HEART RATE: 88 BPM | DIASTOLIC BLOOD PRESSURE: 68 MMHG | SYSTOLIC BLOOD PRESSURE: 138 MMHG

## 2021-06-25 DIAGNOSIS — M62.89 LARYNGEAL MUSCLE TENSION DISORDER: ICD-10-CM

## 2021-06-25 DIAGNOSIS — J38.3 VOCAL FOLD ATROPHY: ICD-10-CM

## 2021-06-25 DIAGNOSIS — R49.0 DYSPHONIA: Primary | ICD-10-CM

## 2021-06-25 PROCEDURE — 31579 PR LARYNGOSCOPY, FLEX/RIGID TELESCOPIC, W/STROBOSCOPY: ICD-10-PCS | Mod: S$PBB,,, | Performed by: OTOLARYNGOLOGY

## 2021-06-25 PROCEDURE — 31579 LARYNGOSCOPY TELESCOPIC: CPT | Mod: S$PBB,,, | Performed by: OTOLARYNGOLOGY

## 2021-06-25 PROCEDURE — 99213 OFFICE O/P EST LOW 20 MIN: CPT | Mod: 25,S$PBB,, | Performed by: OTOLARYNGOLOGY

## 2021-06-25 PROCEDURE — 99999 PR PBB SHADOW E&M-EST. PATIENT-LVL III: ICD-10-PCS | Mod: PBBFAC,,, | Performed by: OTOLARYNGOLOGY

## 2021-06-25 PROCEDURE — 31579 LARYNGOSCOPY TELESCOPIC: CPT | Mod: PBBFAC | Performed by: OTOLARYNGOLOGY

## 2021-06-25 PROCEDURE — 99213 PR OFFICE/OUTPT VISIT, EST, LEVL III, 20-29 MIN: ICD-10-PCS | Mod: 25,S$PBB,, | Performed by: OTOLARYNGOLOGY

## 2021-06-25 PROCEDURE — 99213 OFFICE O/P EST LOW 20 MIN: CPT | Mod: PBBFAC,25 | Performed by: OTOLARYNGOLOGY

## 2021-06-25 PROCEDURE — 99999 PR PBB SHADOW E&M-EST. PATIENT-LVL III: CPT | Mod: PBBFAC,,, | Performed by: OTOLARYNGOLOGY

## 2021-06-30 ENCOUNTER — EXTERNAL CHRONIC CARE MANAGEMENT (OUTPATIENT)
Dept: PRIMARY CARE CLINIC | Facility: CLINIC | Age: 86
End: 2021-06-30
Payer: MEDICARE

## 2021-06-30 PROCEDURE — 99490 PR CHRONIC CARE MGMT, 1ST 20 MIN: ICD-10-PCS | Mod: S$PBB,,, | Performed by: FAMILY MEDICINE

## 2021-06-30 PROCEDURE — 99490 CHRNC CARE MGMT STAFF 1ST 20: CPT | Mod: S$PBB,,, | Performed by: FAMILY MEDICINE

## 2021-06-30 PROCEDURE — 99490 CHRNC CARE MGMT STAFF 1ST 20: CPT | Mod: PBBFAC | Performed by: FAMILY MEDICINE

## 2021-07-12 ENCOUNTER — PATIENT MESSAGE (OUTPATIENT)
Dept: NEUROLOGY | Facility: CLINIC | Age: 86
End: 2021-07-12

## 2021-07-13 ENCOUNTER — TELEPHONE (OUTPATIENT)
Dept: ORTHOPEDICS | Facility: CLINIC | Age: 86
End: 2021-07-13

## 2021-07-13 DIAGNOSIS — M25.512 LEFT SHOULDER PAIN: Primary | ICD-10-CM

## 2021-07-14 ENCOUNTER — HOSPITAL ENCOUNTER (OUTPATIENT)
Dept: RADIOLOGY | Facility: OTHER | Age: 86
Discharge: HOME | End: 2021-07-14
Attending: ORTHOPAEDIC SURGERY
Payer: MEDICARE

## 2021-07-14 ENCOUNTER — HOSPITAL ENCOUNTER (OUTPATIENT)
Dept: RADIOLOGY | Facility: OTHER | Age: 86
Discharge: HOME | End: 2021-07-14
Attending: PHYSICIAN ASSISTANT
Payer: MEDICARE

## 2021-07-14 DIAGNOSIS — R52 PAIN: Primary | ICD-10-CM

## 2021-07-14 DIAGNOSIS — R52 PAIN: ICD-10-CM

## 2021-07-14 DIAGNOSIS — M25.512 LEFT SHOULDER PAIN: ICD-10-CM

## 2021-07-14 PROCEDURE — 73030 X-RAY EXAM OF SHOULDER: CPT | Mod: 26,LT,, | Performed by: RADIOLOGY

## 2021-07-14 PROCEDURE — 73030 X-RAY EXAM OF SHOULDER: CPT | Mod: 26,RT,, | Performed by: RADIOLOGY

## 2021-07-14 PROCEDURE — 73030 X-RAY EXAM OF SHOULDER: CPT | Mod: TC,FY,RT

## 2021-07-14 PROCEDURE — 73030 X-RAY EXAM OF SHOULDER: CPT | Mod: TC,FY,LT

## 2021-07-14 PROCEDURE — 73030 XR SHOULDER TRAUMA 3 VIEW RIGHT: ICD-10-PCS | Mod: 26,RT,, | Performed by: RADIOLOGY

## 2021-07-14 PROCEDURE — 73030 XR SHOULDER TRAUMA 3 VIEW LEFT: ICD-10-PCS | Mod: 26,LT,, | Performed by: RADIOLOGY

## 2021-07-16 ENCOUNTER — PATIENT MESSAGE (OUTPATIENT)
Dept: NEUROLOGY | Facility: CLINIC | Age: 86
End: 2021-07-16

## 2021-07-24 ENCOUNTER — OFFICE VISIT (OUTPATIENT)
Dept: URGENT CARE | Facility: CLINIC | Age: 86
End: 2021-07-24
Payer: MEDICARE

## 2021-07-24 VITALS
SYSTOLIC BLOOD PRESSURE: 146 MMHG | RESPIRATION RATE: 18 BRPM | TEMPERATURE: 98 F | HEART RATE: 80 BPM | OXYGEN SATURATION: 98 % | DIASTOLIC BLOOD PRESSURE: 81 MMHG

## 2021-07-24 DIAGNOSIS — N30.01 ACUTE CYSTITIS WITH HEMATURIA: Primary | ICD-10-CM

## 2021-07-24 DIAGNOSIS — R35.0 URINARY FREQUENCY: ICD-10-CM

## 2021-07-24 LAB
BILIRUB UR QL STRIP: NEGATIVE
GLUCOSE UR QL STRIP: NEGATIVE
KETONES UR QL STRIP: NEGATIVE
LEUKOCYTE ESTERASE UR QL STRIP: POSITIVE
PH, POC UA: 5.5 (ref 5–8)
POC BLOOD, URINE: NEGATIVE
POC NITRATES, URINE: POSITIVE
PROT UR QL STRIP: POSITIVE
SP GR UR STRIP: 1.02 (ref 1–1.03)
UROBILINOGEN UR STRIP-ACNC: NORMAL (ref 0.1–1.1)

## 2021-07-24 PROCEDURE — 99214 OFFICE O/P EST MOD 30 MIN: CPT | Mod: 25,S$GLB,, | Performed by: PHYSICIAN ASSISTANT

## 2021-07-24 PROCEDURE — 99214 PR OFFICE/OUTPT VISIT, EST, LEVL IV, 30-39 MIN: ICD-10-PCS | Mod: 25,S$GLB,, | Performed by: PHYSICIAN ASSISTANT

## 2021-07-24 PROCEDURE — 81003 POCT URINALYSIS, DIPSTICK, AUTOMATED, W/O SCOPE: ICD-10-PCS | Mod: QW,S$GLB,, | Performed by: PHYSICIAN ASSISTANT

## 2021-07-24 PROCEDURE — 81003 URINALYSIS AUTO W/O SCOPE: CPT | Mod: QW,S$GLB,, | Performed by: PHYSICIAN ASSISTANT

## 2021-07-24 RX ORDER — PHENAZOPYRIDINE HYDROCHLORIDE 100 MG/1
100 TABLET, FILM COATED ORAL 3 TIMES DAILY PRN
Qty: 9 TABLET | Refills: 0 | Status: SHIPPED | OUTPATIENT
Start: 2021-07-24 | End: 2021-07-24 | Stop reason: CLARIF

## 2021-07-24 RX ORDER — PHENAZOPYRIDINE HYDROCHLORIDE 100 MG/1
100 TABLET, FILM COATED ORAL 3 TIMES DAILY PRN
Qty: 9 TABLET | Refills: 0 | Status: SHIPPED | OUTPATIENT
Start: 2021-07-24 | End: 2021-07-27

## 2021-07-24 RX ORDER — SULFAMETHOXAZOLE AND TRIMETHOPRIM 800; 160 MG/1; MG/1
1 TABLET ORAL 2 TIMES DAILY
Qty: 10 TABLET | Refills: 0 | Status: SHIPPED | OUTPATIENT
Start: 2021-07-24 | End: 2021-07-29

## 2021-07-24 RX ORDER — SULFAMETHOXAZOLE AND TRIMETHOPRIM 800; 160 MG/1; MG/1
1 TABLET ORAL 2 TIMES DAILY
Qty: 10 TABLET | Refills: 0 | Status: SHIPPED | OUTPATIENT
Start: 2021-07-24 | End: 2021-07-24 | Stop reason: CLARIF

## 2021-07-29 ENCOUNTER — PATIENT OUTREACH (OUTPATIENT)
Dept: ADMINISTRATIVE | Facility: OTHER | Age: 86
End: 2021-07-29

## 2021-07-30 ENCOUNTER — CARE AT HOME (OUTPATIENT)
Dept: HOME HEALTH SERVICES | Facility: CLINIC | Age: 86
End: 2021-07-30
Payer: MEDICARE

## 2021-07-30 DIAGNOSIS — I48.0 AF (PAROXYSMAL ATRIAL FIBRILLATION): ICD-10-CM

## 2021-07-30 DIAGNOSIS — F01.50 VASCULAR DEMENTIA WITHOUT BEHAVIORAL DISTURBANCE: Primary | ICD-10-CM

## 2021-07-30 PROCEDURE — 99350 HOME/RES VST EST HIGH MDM 60: CPT | Mod: S$GLB,,, | Performed by: NURSE PRACTITIONER

## 2021-07-30 PROCEDURE — 99350 PR HOME VISIT,ESTAB PATIENT,LEVEL IV: ICD-10-PCS | Mod: S$GLB,,, | Performed by: NURSE PRACTITIONER

## 2021-07-31 ENCOUNTER — EXTERNAL CHRONIC CARE MANAGEMENT (OUTPATIENT)
Dept: PRIMARY CARE CLINIC | Facility: CLINIC | Age: 86
End: 2021-07-31
Payer: MEDICARE

## 2021-07-31 PROCEDURE — 99490 PR CHRONIC CARE MGMT, 1ST 20 MIN: ICD-10-PCS | Mod: S$PBB,,, | Performed by: FAMILY MEDICINE

## 2021-07-31 PROCEDURE — 99490 CHRNC CARE MGMT STAFF 1ST 20: CPT | Mod: PBBFAC | Performed by: FAMILY MEDICINE

## 2021-07-31 PROCEDURE — 99490 CHRNC CARE MGMT STAFF 1ST 20: CPT | Mod: S$PBB,,, | Performed by: FAMILY MEDICINE

## 2021-08-01 VITALS
OXYGEN SATURATION: 98 % | BODY MASS INDEX: 26.98 KG/M2 | TEMPERATURE: 98 F | DIASTOLIC BLOOD PRESSURE: 71 MMHG | HEART RATE: 78 BPM | WEIGHT: 158 LBS | RESPIRATION RATE: 18 BRPM | HEIGHT: 64 IN | SYSTOLIC BLOOD PRESSURE: 133 MMHG

## 2021-08-03 ENCOUNTER — CARE AT HOME (OUTPATIENT)
Dept: HOME HEALTH SERVICES | Facility: CLINIC | Age: 86
End: 2021-08-03
Payer: MEDICARE

## 2021-08-03 VITALS — HEIGHT: 64 IN | BODY MASS INDEX: 26.98 KG/M2 | WEIGHT: 158 LBS

## 2021-08-03 DIAGNOSIS — I69.154 HEMIPLEGIA AND HEMIPARESIS FOLLOWING NONTRAUMATIC INTRACEREBRAL HEMORRHAGE AFFECTING LEFT NON-DOMINANT SIDE: ICD-10-CM

## 2021-08-03 DIAGNOSIS — Z86.73 HISTORY OF STROKE: Primary | ICD-10-CM

## 2021-08-03 PROCEDURE — 99442 PR PHYSICIAN TELEPHONE EVALUATION 11-20 MIN: ICD-10-PCS | Mod: 95,,, | Performed by: NURSE PRACTITIONER

## 2021-08-03 PROCEDURE — 99442 PR PHYSICIAN TELEPHONE EVALUATION 11-20 MIN: CPT | Mod: 95,,, | Performed by: NURSE PRACTITIONER

## 2021-08-05 ENCOUNTER — TELEPHONE (OUTPATIENT)
Dept: INTERNAL MEDICINE | Facility: CLINIC | Age: 86
End: 2021-08-05

## 2021-08-05 DIAGNOSIS — I27.23 PULMONARY HYPERTENSION DUE TO INTERSTITIAL LUNG DISEASE: Primary | ICD-10-CM

## 2021-08-05 DIAGNOSIS — J84.9 PULMONARY HYPERTENSION DUE TO INTERSTITIAL LUNG DISEASE: Primary | ICD-10-CM

## 2021-08-05 DIAGNOSIS — J84.116 CRYPTOGENIC ORGANIZING PNEUMONIA: ICD-10-CM

## 2021-08-17 ENCOUNTER — PATIENT MESSAGE (OUTPATIENT)
Dept: PAIN MEDICINE | Facility: CLINIC | Age: 86
End: 2021-08-17

## 2021-08-17 ENCOUNTER — PATIENT MESSAGE (OUTPATIENT)
Dept: SPINE | Facility: CLINIC | Age: 86
End: 2021-08-17

## 2021-08-17 ENCOUNTER — PATIENT MESSAGE (OUTPATIENT)
Dept: CARDIOLOGY | Facility: CLINIC | Age: 86
End: 2021-08-17

## 2021-08-17 ENCOUNTER — PATIENT MESSAGE (OUTPATIENT)
Dept: GASTROENTEROLOGY | Facility: CLINIC | Age: 86
End: 2021-08-17

## 2021-08-17 RX ORDER — GABAPENTIN 300 MG/1
300 CAPSULE ORAL NIGHTLY
Qty: 90 CAPSULE | Refills: 2 | Status: SHIPPED | OUTPATIENT
Start: 2021-08-17 | End: 2021-10-08 | Stop reason: SDUPTHER

## 2021-08-17 RX ORDER — GABAPENTIN 100 MG/1
100-300 CAPSULE ORAL 2 TIMES DAILY
Qty: 180 CAPSULE | Refills: 2 | Status: ON HOLD | OUTPATIENT
Start: 2021-08-17 | End: 2022-01-31 | Stop reason: HOSPADM

## 2021-08-27 ENCOUNTER — TELEPHONE (OUTPATIENT)
Dept: ORTHOPEDICS | Facility: CLINIC | Age: 86
End: 2021-08-27

## 2021-08-27 ENCOUNTER — PATIENT MESSAGE (OUTPATIENT)
Dept: PAIN MEDICINE | Facility: CLINIC | Age: 86
End: 2021-08-27

## 2021-08-30 ENCOUNTER — TELEPHONE (OUTPATIENT)
Dept: ORTHOPEDICS | Facility: CLINIC | Age: 86
End: 2021-08-30

## 2021-08-31 ENCOUNTER — EXTERNAL CHRONIC CARE MANAGEMENT (OUTPATIENT)
Dept: PRIMARY CARE CLINIC | Facility: CLINIC | Age: 86
End: 2021-08-31
Payer: MEDICARE

## 2021-08-31 PROCEDURE — 99490 CHRNC CARE MGMT STAFF 1ST 20: CPT | Mod: PBBFAC | Performed by: FAMILY MEDICINE

## 2021-08-31 PROCEDURE — 99490 PR CHRONIC CARE MGMT, 1ST 20 MIN: ICD-10-PCS | Mod: S$PBB,,, | Performed by: FAMILY MEDICINE

## 2021-08-31 PROCEDURE — 99490 CHRNC CARE MGMT STAFF 1ST 20: CPT | Mod: S$PBB,,, | Performed by: FAMILY MEDICINE

## 2021-09-10 ENCOUNTER — IMMUNIZATION (OUTPATIENT)
Dept: INTERNAL MEDICINE | Facility: CLINIC | Age: 86
End: 2021-09-10
Payer: MEDICARE

## 2021-09-10 DIAGNOSIS — Z23 NEED FOR VACCINATION: Primary | ICD-10-CM

## 2021-09-10 PROCEDURE — 0003A COVID-19, MRNA, LNP-S, PF, 30 MCG/0.3 ML DOSE VACCINE: ICD-10-PCS | Mod: CV19,,, | Performed by: INTERNAL MEDICINE

## 2021-09-10 PROCEDURE — 91300 COVID-19, MRNA, LNP-S, PF, 30 MCG/0.3 ML DOSE VACCINE: ICD-10-PCS | Mod: ,,, | Performed by: INTERNAL MEDICINE

## 2021-09-10 PROCEDURE — 0003A COVID-19, MRNA, LNP-S, PF, 30 MCG/0.3 ML DOSE VACCINE: CPT | Mod: CV19,,, | Performed by: INTERNAL MEDICINE

## 2021-09-10 PROCEDURE — 91300 COVID-19, MRNA, LNP-S, PF, 30 MCG/0.3 ML DOSE VACCINE: CPT | Mod: ,,, | Performed by: INTERNAL MEDICINE

## 2021-09-14 ENCOUNTER — CARE AT HOME (OUTPATIENT)
Dept: HOME HEALTH SERVICES | Facility: CLINIC | Age: 86
End: 2021-09-14
Payer: MEDICARE

## 2021-09-14 VITALS
WEIGHT: 158 LBS | TEMPERATURE: 98 F | HEART RATE: 82 BPM | HEIGHT: 64 IN | RESPIRATION RATE: 18 BRPM | DIASTOLIC BLOOD PRESSURE: 68 MMHG | SYSTOLIC BLOOD PRESSURE: 134 MMHG | OXYGEN SATURATION: 98 % | BODY MASS INDEX: 26.98 KG/M2

## 2021-09-14 DIAGNOSIS — I10 HYPERTENSION, UNSPECIFIED TYPE: Primary | ICD-10-CM

## 2021-09-14 PROCEDURE — 99350 PR HOME VISIT,ESTAB PATIENT,LEVEL IV: ICD-10-PCS | Mod: S$GLB,,, | Performed by: NURSE PRACTITIONER

## 2021-09-14 PROCEDURE — 99497 PR ADVNCD CARE PLAN 30 MIN: ICD-10-PCS | Mod: S$GLB,,, | Performed by: NURSE PRACTITIONER

## 2021-09-14 PROCEDURE — 99350 HOME/RES VST EST HIGH MDM 60: CPT | Mod: S$GLB,,, | Performed by: NURSE PRACTITIONER

## 2021-09-14 PROCEDURE — 99497 ADVNCD CARE PLAN 30 MIN: CPT | Mod: S$GLB,,, | Performed by: NURSE PRACTITIONER

## 2021-09-16 ENCOUNTER — DOCUMENT SCAN (OUTPATIENT)
Dept: HOME HEALTH SERVICES | Facility: HOSPITAL | Age: 86
End: 2021-09-16
Payer: MEDICARE

## 2021-09-16 ENCOUNTER — PATIENT OUTREACH (OUTPATIENT)
Dept: ADMINISTRATIVE | Facility: OTHER | Age: 86
End: 2021-09-16

## 2021-09-17 ENCOUNTER — TELEPHONE (OUTPATIENT)
Dept: PAIN MEDICINE | Facility: CLINIC | Age: 86
End: 2021-09-17

## 2021-09-20 ENCOUNTER — OFFICE VISIT (OUTPATIENT)
Dept: GASTROENTEROLOGY | Facility: CLINIC | Age: 86
End: 2021-09-20
Payer: MEDICARE

## 2021-09-20 DIAGNOSIS — R14.2 BELCHING: ICD-10-CM

## 2021-09-20 DIAGNOSIS — K59.04 CHRONIC IDIOPATHIC CONSTIPATION: Primary | ICD-10-CM

## 2021-09-20 DIAGNOSIS — R13.12 OROPHARYNGEAL DYSPHAGIA: ICD-10-CM

## 2021-09-20 PROCEDURE — 99213 PR OFFICE/OUTPT VISIT, EST, LEVL III, 20-29 MIN: ICD-10-PCS | Mod: S$PBB,,, | Performed by: INTERNAL MEDICINE

## 2021-09-20 PROCEDURE — 99999 PR PBB SHADOW E&M-EST. PATIENT-LVL III: CPT | Mod: PBBFAC,,, | Performed by: INTERNAL MEDICINE

## 2021-09-20 PROCEDURE — 99213 OFFICE O/P EST LOW 20 MIN: CPT | Mod: PBBFAC,PO | Performed by: INTERNAL MEDICINE

## 2021-09-20 PROCEDURE — 99213 OFFICE O/P EST LOW 20 MIN: CPT | Mod: S$PBB,,, | Performed by: INTERNAL MEDICINE

## 2021-09-20 PROCEDURE — 99999 PR PBB SHADOW E&M-EST. PATIENT-LVL III: ICD-10-PCS | Mod: PBBFAC,,, | Performed by: INTERNAL MEDICINE

## 2021-09-22 ENCOUNTER — EXTERNAL HOME HEALTH (OUTPATIENT)
Dept: HOME HEALTH SERVICES | Facility: HOSPITAL | Age: 86
End: 2021-09-22
Payer: MEDICARE

## 2021-09-23 ENCOUNTER — OFFICE VISIT (OUTPATIENT)
Dept: NEUROLOGY | Facility: CLINIC | Age: 86
End: 2021-09-23
Payer: MEDICARE

## 2021-09-23 VITALS
WEIGHT: 158.06 LBS | HEART RATE: 85 BPM | DIASTOLIC BLOOD PRESSURE: 77 MMHG | BODY MASS INDEX: 26.98 KG/M2 | HEIGHT: 64 IN | SYSTOLIC BLOOD PRESSURE: 131 MMHG

## 2021-09-23 DIAGNOSIS — I69.154 HEMIPLEGIA AND HEMIPARESIS FOLLOWING NONTRAUMATIC INTRACEREBRAL HEMORRHAGE AFFECTING LEFT NON-DOMINANT SIDE: Primary | ICD-10-CM

## 2021-09-23 PROCEDURE — 99999 PR PBB SHADOW E&M-EST. PATIENT-LVL II: ICD-10-PCS | Mod: PBBFAC,,, | Performed by: PSYCHIATRY & NEUROLOGY

## 2021-09-23 PROCEDURE — 99999 PR PBB SHADOW E&M-EST. PATIENT-LVL II: CPT | Mod: PBBFAC,,, | Performed by: PSYCHIATRY & NEUROLOGY

## 2021-09-23 PROCEDURE — 99212 OFFICE O/P EST SF 10 MIN: CPT | Mod: PBBFAC | Performed by: PSYCHIATRY & NEUROLOGY

## 2021-09-23 PROCEDURE — 99213 OFFICE O/P EST LOW 20 MIN: CPT | Mod: S$PBB,,, | Performed by: PSYCHIATRY & NEUROLOGY

## 2021-09-23 PROCEDURE — 99213 PR OFFICE/OUTPT VISIT, EST, LEVL III, 20-29 MIN: ICD-10-PCS | Mod: S$PBB,,, | Performed by: PSYCHIATRY & NEUROLOGY

## 2021-09-23 RX ORDER — BACLOFEN 10 MG/1
5-10 TABLET ORAL 3 TIMES DAILY PRN
Qty: 90 TABLET | Refills: 2 | Status: SHIPPED | OUTPATIENT
Start: 2021-09-23 | End: 2022-05-26 | Stop reason: SDUPTHER

## 2021-09-25 ENCOUNTER — PATIENT MESSAGE (OUTPATIENT)
Dept: ORTHOPEDICS | Facility: CLINIC | Age: 86
End: 2021-09-25

## 2021-09-25 ENCOUNTER — PATIENT MESSAGE (OUTPATIENT)
Dept: GASTROENTEROLOGY | Facility: CLINIC | Age: 86
End: 2021-09-25

## 2021-09-25 ENCOUNTER — PATIENT MESSAGE (OUTPATIENT)
Dept: PAIN MEDICINE | Facility: CLINIC | Age: 86
End: 2021-09-25

## 2021-09-29 ENCOUNTER — LAB VISIT (OUTPATIENT)
Dept: LAB | Facility: OTHER | Age: 86
End: 2021-09-29
Attending: NURSE PRACTITIONER
Payer: MEDICARE

## 2021-09-29 DIAGNOSIS — I10 HYPERTENSION, UNSPECIFIED TYPE: ICD-10-CM

## 2021-09-29 LAB — POTASSIUM SERPL-SCNC: 3.6 MMOL/L (ref 3.5–5.1)

## 2021-09-29 PROCEDURE — 84132 ASSAY OF SERUM POTASSIUM: CPT | Performed by: NURSE PRACTITIONER

## 2021-09-29 PROCEDURE — 36415 COLL VENOUS BLD VENIPUNCTURE: CPT | Performed by: NURSE PRACTITIONER

## 2021-09-30 ENCOUNTER — EXTERNAL CHRONIC CARE MANAGEMENT (OUTPATIENT)
Dept: PRIMARY CARE CLINIC | Facility: CLINIC | Age: 86
End: 2021-09-30
Payer: MEDICARE

## 2021-09-30 ENCOUNTER — OFFICE VISIT (OUTPATIENT)
Dept: NEUROLOGY | Facility: CLINIC | Age: 86
End: 2021-09-30
Payer: MEDICARE

## 2021-09-30 VITALS
DIASTOLIC BLOOD PRESSURE: 71 MMHG | BODY MASS INDEX: 26.98 KG/M2 | SYSTOLIC BLOOD PRESSURE: 113 MMHG | HEIGHT: 64 IN | WEIGHT: 158.06 LBS | HEART RATE: 84 BPM

## 2021-09-30 DIAGNOSIS — G43.719 INTRACTABLE CHRONIC MIGRAINE WITHOUT AURA AND WITHOUT STATUS MIGRAINOSUS: Primary | ICD-10-CM

## 2021-09-30 PROCEDURE — 99214 PR OFFICE/OUTPT VISIT, EST, LEVL IV, 30-39 MIN: ICD-10-PCS | Mod: S$PBB,,, | Performed by: PSYCHIATRY & NEUROLOGY

## 2021-09-30 PROCEDURE — 99214 OFFICE O/P EST MOD 30 MIN: CPT | Mod: S$PBB,,, | Performed by: PSYCHIATRY & NEUROLOGY

## 2021-09-30 PROCEDURE — 99999 PR PBB SHADOW E&M-EST. PATIENT-LVL IV: CPT | Mod: PBBFAC,,, | Performed by: PSYCHIATRY & NEUROLOGY

## 2021-09-30 PROCEDURE — 99214 OFFICE O/P EST MOD 30 MIN: CPT | Mod: PBBFAC,25 | Performed by: PSYCHIATRY & NEUROLOGY

## 2021-09-30 PROCEDURE — 99490 CHRNC CARE MGMT STAFF 1ST 20: CPT | Mod: PBBFAC | Performed by: FAMILY MEDICINE

## 2021-09-30 PROCEDURE — 99490 PR CHRONIC CARE MGMT, 1ST 20 MIN: ICD-10-PCS | Mod: S$PBB,,, | Performed by: FAMILY MEDICINE

## 2021-09-30 PROCEDURE — 99490 CHRNC CARE MGMT STAFF 1ST 20: CPT | Mod: S$PBB,,, | Performed by: FAMILY MEDICINE

## 2021-09-30 PROCEDURE — 99999 PR PBB SHADOW E&M-EST. PATIENT-LVL IV: ICD-10-PCS | Mod: PBBFAC,,, | Performed by: PSYCHIATRY & NEUROLOGY

## 2021-10-01 ENCOUNTER — TELEPHONE (OUTPATIENT)
Dept: NEUROLOGY | Facility: CLINIC | Age: 86
End: 2021-10-01

## 2021-10-01 ENCOUNTER — OFFICE VISIT (OUTPATIENT)
Dept: PULMONOLOGY | Facility: CLINIC | Age: 86
End: 2021-10-01
Attending: FAMILY MEDICINE
Payer: MEDICARE

## 2021-10-01 VITALS
OXYGEN SATURATION: 99 % | BODY MASS INDEX: 26.98 KG/M2 | WEIGHT: 158.06 LBS | DIASTOLIC BLOOD PRESSURE: 62 MMHG | SYSTOLIC BLOOD PRESSURE: 110 MMHG | HEART RATE: 105 BPM | HEIGHT: 64 IN

## 2021-10-01 DIAGNOSIS — I27.23 PULMONARY HYPERTENSION DUE TO INTERSTITIAL LUNG DISEASE: ICD-10-CM

## 2021-10-01 DIAGNOSIS — M05.79 SEROPOSITIVE RHEUMATOID ARTHRITIS OF MULTIPLE SITES: ICD-10-CM

## 2021-10-01 DIAGNOSIS — J84.9 PULMONARY HYPERTENSION DUE TO INTERSTITIAL LUNG DISEASE: ICD-10-CM

## 2021-10-01 DIAGNOSIS — J84.116 CRYPTOGENIC ORGANIZING PNEUMONIA: ICD-10-CM

## 2021-10-01 PROCEDURE — 99214 OFFICE O/P EST MOD 30 MIN: CPT | Mod: S$PBB,GC,, | Performed by: EMERGENCY MEDICINE

## 2021-10-01 PROCEDURE — 99999 PR PBB SHADOW E&M-EST. PATIENT-LVL IV: CPT | Mod: PBBFAC,GC,, | Performed by: STUDENT IN AN ORGANIZED HEALTH CARE EDUCATION/TRAINING PROGRAM

## 2021-10-01 PROCEDURE — 99214 OFFICE O/P EST MOD 30 MIN: CPT | Mod: PBBFAC | Performed by: STUDENT IN AN ORGANIZED HEALTH CARE EDUCATION/TRAINING PROGRAM

## 2021-10-01 PROCEDURE — 99999 PR PBB SHADOW E&M-EST. PATIENT-LVL IV: ICD-10-PCS | Mod: PBBFAC,GC,, | Performed by: STUDENT IN AN ORGANIZED HEALTH CARE EDUCATION/TRAINING PROGRAM

## 2021-10-01 PROCEDURE — 99214 PR OFFICE/OUTPT VISIT, EST, LEVL IV, 30-39 MIN: ICD-10-PCS | Mod: S$PBB,GC,, | Performed by: EMERGENCY MEDICINE

## 2021-10-01 RX ORDER — PHENAZOPYRIDINE HYDROCHLORIDE 100 MG/1
TABLET, FILM COATED ORAL
Status: ON HOLD | COMMUNITY
Start: 2021-07-24 | End: 2022-02-08 | Stop reason: HOSPADM

## 2021-10-01 RX ORDER — SULFAMETHOXAZOLE AND TRIMETHOPRIM 800; 160 MG/1; MG/1
TABLET ORAL
Status: ON HOLD | COMMUNITY
Start: 2021-07-24 | End: 2022-02-08 | Stop reason: HOSPADM

## 2021-10-01 RX ORDER — PREDNISONE 10 MG/1
10 TABLET ORAL DAILY
Qty: 30 TABLET | Refills: 11 | Status: SHIPPED | OUTPATIENT
Start: 2021-10-01 | End: 2021-11-23

## 2021-10-01 RX ORDER — HYDROXYCHLOROQUINE SULFATE 200 MG/1
200 TABLET, FILM COATED ORAL 2 TIMES DAILY
Qty: 180 TABLET | Refills: 11 | Status: ON HOLD | OUTPATIENT
Start: 2021-10-01 | End: 2022-01-31

## 2021-10-05 ENCOUNTER — DOCUMENT SCAN (OUTPATIENT)
Dept: HOME HEALTH SERVICES | Facility: HOSPITAL | Age: 86
End: 2021-10-05
Payer: MEDICARE

## 2021-10-06 RX ORDER — AMLODIPINE BESYLATE 10 MG/1
TABLET ORAL
Qty: 90 TABLET | Refills: 0 | Status: SHIPPED | OUTPATIENT
Start: 2021-10-06 | End: 2021-10-07 | Stop reason: SDUPTHER

## 2021-10-06 RX ORDER — MONTELUKAST SODIUM 10 MG/1
TABLET ORAL
Qty: 90 TABLET | Refills: 0 | Status: SHIPPED | OUTPATIENT
Start: 2021-10-06 | End: 2021-10-07 | Stop reason: SDUPTHER

## 2021-10-07 RX ORDER — MONTELUKAST SODIUM 10 MG/1
10 TABLET ORAL NIGHTLY
Qty: 90 TABLET | Refills: 0 | Status: SHIPPED | OUTPATIENT
Start: 2021-10-07 | End: 2022-06-10

## 2021-10-07 RX ORDER — AMLODIPINE BESYLATE 10 MG/1
10 TABLET ORAL DAILY
Qty: 90 TABLET | Refills: 0 | Status: ON HOLD | OUTPATIENT
Start: 2021-10-07 | End: 2022-02-03 | Stop reason: HOSPADM

## 2021-10-08 ENCOUNTER — OFFICE VISIT (OUTPATIENT)
Dept: NEUROLOGY | Facility: CLINIC | Age: 86
End: 2021-10-08
Payer: MEDICARE

## 2021-10-08 ENCOUNTER — HOSPITAL ENCOUNTER (OUTPATIENT)
Dept: RADIOLOGY | Facility: HOSPITAL | Age: 86
Discharge: HOME | End: 2021-10-08
Attending: PSYCHIATRY & NEUROLOGY
Payer: MEDICARE

## 2021-10-08 VITALS
HEIGHT: 64 IN | SYSTOLIC BLOOD PRESSURE: 106 MMHG | HEART RATE: 81 BPM | BODY MASS INDEX: 26.98 KG/M2 | WEIGHT: 158 LBS | DIASTOLIC BLOOD PRESSURE: 62 MMHG

## 2021-10-08 DIAGNOSIS — G24.3 CERVICAL DYSTONIA: ICD-10-CM

## 2021-10-08 DIAGNOSIS — G44.40 REBOUND HEADACHE: ICD-10-CM

## 2021-10-08 DIAGNOSIS — M54.81 BILATERAL OCCIPITAL NEURALGIA: ICD-10-CM

## 2021-10-08 DIAGNOSIS — G43.719 INTRACTABLE CHRONIC MIGRAINE WITHOUT AURA AND WITHOUT STATUS MIGRAINOSUS: Primary | ICD-10-CM

## 2021-10-08 DIAGNOSIS — M50.30 DDD (DEGENERATIVE DISC DISEASE), CERVICAL: ICD-10-CM

## 2021-10-08 DIAGNOSIS — G44.86 CERVICOGENIC HEADACHE: ICD-10-CM

## 2021-10-08 PROCEDURE — 72050 X-RAY EXAM NECK SPINE 4/5VWS: CPT | Mod: TC

## 2021-10-08 PROCEDURE — 99999 PR PBB SHADOW E&M-EST. PATIENT-LVL V: CPT | Mod: PBBFAC,,, | Performed by: PSYCHIATRY & NEUROLOGY

## 2021-10-08 PROCEDURE — 72050 XR CERVICAL SPINE COMPLETE 5 VIEW: ICD-10-PCS | Mod: 26,,, | Performed by: RADIOLOGY

## 2021-10-08 PROCEDURE — 99999 PR PBB SHADOW E&M-EST. PATIENT-LVL V: ICD-10-PCS | Mod: PBBFAC,,, | Performed by: PSYCHIATRY & NEUROLOGY

## 2021-10-08 PROCEDURE — 72050 X-RAY EXAM NECK SPINE 4/5VWS: CPT | Mod: 26,,, | Performed by: RADIOLOGY

## 2021-10-08 PROCEDURE — 99215 OFFICE O/P EST HI 40 MIN: CPT | Mod: S$PBB,,, | Performed by: PSYCHIATRY & NEUROLOGY

## 2021-10-08 PROCEDURE — 99215 PR OFFICE/OUTPT VISIT, EST, LEVL V, 40-54 MIN: ICD-10-PCS | Mod: S$PBB,,, | Performed by: PSYCHIATRY & NEUROLOGY

## 2021-10-08 PROCEDURE — 99215 OFFICE O/P EST HI 40 MIN: CPT | Mod: PBBFAC | Performed by: PSYCHIATRY & NEUROLOGY

## 2021-10-08 RX ORDER — GABAPENTIN 300 MG/1
300 CAPSULE ORAL NIGHTLY
Qty: 90 CAPSULE | Refills: 2 | Status: SHIPPED | OUTPATIENT
Start: 2021-10-08 | End: 2022-05-26 | Stop reason: SDUPTHER

## 2021-10-08 RX ORDER — RIMEGEPANT SULFATE 75 MG/75MG
75 TABLET, ORALLY DISINTEGRATING ORAL ONCE AS NEEDED
Qty: 8 TABLET | Refills: 3 | Status: SHIPPED | OUTPATIENT
Start: 2021-10-08 | End: 2021-10-08

## 2021-10-11 ENCOUNTER — CLINICAL SUPPORT (OUTPATIENT)
Dept: REHABILITATION | Facility: OTHER | Age: 86
End: 2021-10-11
Payer: MEDICARE

## 2021-10-11 ENCOUNTER — TELEPHONE (OUTPATIENT)
Dept: ORTHOPEDICS | Facility: CLINIC | Age: 86
End: 2021-10-11

## 2021-10-11 DIAGNOSIS — M53.82 DECREASED RANGE OF MOTION OF INTERVERTEBRAL DISCS OF CERVICAL SPINE: ICD-10-CM

## 2021-10-11 DIAGNOSIS — R29.898 WEAKNESS OF SHOULDER: ICD-10-CM

## 2021-10-11 DIAGNOSIS — M25.619 DECREASED RANGE OF MOTION OF SHOULDER, UNSPECIFIED LATERALITY: ICD-10-CM

## 2021-10-11 DIAGNOSIS — M54.2 CERVICAL PAIN: ICD-10-CM

## 2021-10-11 DIAGNOSIS — G44.86 CERVICOGENIC HEADACHE: ICD-10-CM

## 2021-10-11 DIAGNOSIS — M50.30 DDD (DEGENERATIVE DISC DISEASE), CERVICAL: ICD-10-CM

## 2021-10-11 DIAGNOSIS — M54.81 BILATERAL OCCIPITAL NEURALGIA: ICD-10-CM

## 2021-10-11 PROCEDURE — 97110 THERAPEUTIC EXERCISES: CPT | Mod: PN

## 2021-10-11 PROCEDURE — 97161 PT EVAL LOW COMPLEX 20 MIN: CPT | Mod: PN

## 2021-10-12 ENCOUNTER — OFFICE VISIT (OUTPATIENT)
Dept: ORTHOPEDICS | Facility: CLINIC | Age: 86
End: 2021-10-12
Payer: MEDICARE

## 2021-10-12 VITALS — BODY MASS INDEX: 26.98 KG/M2 | WEIGHT: 158 LBS | HEIGHT: 64 IN

## 2021-10-12 DIAGNOSIS — M19.019 SHOULDER ARTHRITIS: Primary | ICD-10-CM

## 2021-10-12 PROCEDURE — 99999 PR PBB SHADOW E&M-EST. PATIENT-LVL III: CPT | Mod: PBBFAC,,, | Performed by: ORTHOPAEDIC SURGERY

## 2021-10-12 PROCEDURE — 20610 DRAIN/INJ JOINT/BURSA W/O US: CPT | Mod: PBBFAC | Performed by: ORTHOPAEDIC SURGERY

## 2021-10-12 PROCEDURE — 99214 OFFICE O/P EST MOD 30 MIN: CPT | Mod: 25,S$PBB,, | Performed by: ORTHOPAEDIC SURGERY

## 2021-10-12 PROCEDURE — 99214 PR OFFICE/OUTPT VISIT, EST, LEVL IV, 30-39 MIN: ICD-10-PCS | Mod: 25,S$PBB,, | Performed by: ORTHOPAEDIC SURGERY

## 2021-10-12 PROCEDURE — 99999 PR PBB SHADOW E&M-EST. PATIENT-LVL III: ICD-10-PCS | Mod: PBBFAC,,, | Performed by: ORTHOPAEDIC SURGERY

## 2021-10-12 PROCEDURE — 20610 DRAIN/INJ JOINT/BURSA W/O US: CPT | Mod: PBBFAC,50 | Performed by: ORTHOPAEDIC SURGERY

## 2021-10-12 PROCEDURE — 20610 LARGE JOINT ASPIRATION/INJECTION: L GLENOHUMERAL: ICD-10-PCS | Mod: S$PBB,50,, | Performed by: ORTHOPAEDIC SURGERY

## 2021-10-12 PROCEDURE — 99213 OFFICE O/P EST LOW 20 MIN: CPT | Mod: PBBFAC,25 | Performed by: ORTHOPAEDIC SURGERY

## 2021-10-12 RX ORDER — TRIAMCINOLONE ACETONIDE 40 MG/ML
80 INJECTION, SUSPENSION INTRA-ARTICULAR; INTRAMUSCULAR
Status: DISCONTINUED | OUTPATIENT
Start: 2021-10-12 | End: 2021-10-12 | Stop reason: HOSPADM

## 2021-10-12 RX ADMIN — TRIAMCINOLONE ACETONIDE 80 MG: 40 INJECTION, SUSPENSION INTRA-ARTICULAR; INTRAMUSCULAR at 01:10

## 2021-10-15 ENCOUNTER — CLINICAL SUPPORT (OUTPATIENT)
Dept: REHABILITATION | Facility: OTHER | Age: 86
End: 2021-10-15
Payer: MEDICARE

## 2021-10-15 DIAGNOSIS — M53.82 DECREASED RANGE OF MOTION OF INTERVERTEBRAL DISCS OF CERVICAL SPINE: Primary | ICD-10-CM

## 2021-10-15 DIAGNOSIS — M25.619 DECREASED RANGE OF MOTION OF SHOULDER, UNSPECIFIED LATERALITY: ICD-10-CM

## 2021-10-15 DIAGNOSIS — M54.2 CERVICAL PAIN: ICD-10-CM

## 2021-10-15 DIAGNOSIS — R29.898 WEAKNESS OF SHOULDER: ICD-10-CM

## 2021-10-15 PROCEDURE — 97140 MANUAL THERAPY 1/> REGIONS: CPT | Mod: PN

## 2021-10-15 PROCEDURE — 97110 THERAPEUTIC EXERCISES: CPT | Mod: PN

## 2021-10-19 ENCOUNTER — CLINICAL SUPPORT (OUTPATIENT)
Dept: REHABILITATION | Facility: OTHER | Age: 86
End: 2021-10-19
Payer: MEDICARE

## 2021-10-19 DIAGNOSIS — R29.898 DECREASED ROM OF NECK: ICD-10-CM

## 2021-10-19 PROCEDURE — 97110 THERAPEUTIC EXERCISES: CPT | Mod: PN

## 2021-10-19 PROCEDURE — 97140 MANUAL THERAPY 1/> REGIONS: CPT | Mod: PN

## 2021-10-22 ENCOUNTER — PATIENT MESSAGE (OUTPATIENT)
Dept: PAIN MEDICINE | Facility: CLINIC | Age: 86
End: 2021-10-22
Payer: MEDICARE

## 2021-10-22 ENCOUNTER — CLINICAL SUPPORT (OUTPATIENT)
Dept: REHABILITATION | Facility: OTHER | Age: 86
End: 2021-10-22
Payer: MEDICARE

## 2021-10-22 ENCOUNTER — PATIENT MESSAGE (OUTPATIENT)
Dept: PULMONOLOGY | Facility: CLINIC | Age: 86
End: 2021-10-22
Payer: MEDICARE

## 2021-10-22 DIAGNOSIS — M25.619 DECREASED RANGE OF MOTION OF SHOULDER, UNSPECIFIED LATERALITY: ICD-10-CM

## 2021-10-22 DIAGNOSIS — R29.898 WEAKNESS OF SHOULDER: ICD-10-CM

## 2021-10-22 DIAGNOSIS — R29.898 DECREASED ROM OF NECK: Primary | ICD-10-CM

## 2021-10-22 DIAGNOSIS — M53.82 DECREASED RANGE OF MOTION OF INTERVERTEBRAL DISCS OF CERVICAL SPINE: ICD-10-CM

## 2021-10-22 PROCEDURE — 97110 THERAPEUTIC EXERCISES: CPT | Mod: PN

## 2021-10-22 PROCEDURE — 97140 MANUAL THERAPY 1/> REGIONS: CPT | Mod: PN

## 2021-10-22 RX ORDER — ALBUTEROL SULFATE 90 UG/1
2 AEROSOL, METERED RESPIRATORY (INHALATION) EVERY 6 HOURS PRN
Qty: 18 G | Refills: 3 | Status: SHIPPED | OUTPATIENT
Start: 2021-10-22 | End: 2022-06-23

## 2021-10-25 ENCOUNTER — OFFICE VISIT (OUTPATIENT)
Dept: OPTOMETRY | Facility: CLINIC | Age: 86
End: 2021-10-25
Payer: MEDICARE

## 2021-10-25 DIAGNOSIS — H52.13 MYOPIA WITH PRESBYOPIA OF BOTH EYES: ICD-10-CM

## 2021-10-25 DIAGNOSIS — M05.79 SEROPOSITIVE RHEUMATOID ARTHRITIS OF MULTIPLE SITES: ICD-10-CM

## 2021-10-25 DIAGNOSIS — H35.012 RETINAL MACROANEURYSM OF LEFT EYE: ICD-10-CM

## 2021-10-25 DIAGNOSIS — H52.4 MYOPIA WITH PRESBYOPIA OF BOTH EYES: ICD-10-CM

## 2021-10-25 DIAGNOSIS — H35.352 CYSTOID MACULAR EDEMA, LEFT EYE: Primary | ICD-10-CM

## 2021-10-25 DIAGNOSIS — Z79.899 LONG-TERM USE OF PLAQUENIL: ICD-10-CM

## 2021-10-25 PROCEDURE — 92015 DETERMINE REFRACTIVE STATE: CPT | Mod: ,,, | Performed by: OPTOMETRIST

## 2021-10-25 PROCEDURE — 92014 COMPRE OPH EXAM EST PT 1/>: CPT | Mod: S$PBB,,, | Performed by: OPTOMETRIST

## 2021-10-25 PROCEDURE — 92015 PR REFRACTION: ICD-10-PCS | Mod: ,,, | Performed by: OPTOMETRIST

## 2021-10-25 PROCEDURE — 92014 PR EYE EXAM, EST PATIENT,COMPREHESV: ICD-10-PCS | Mod: S$PBB,,, | Performed by: OPTOMETRIST

## 2021-10-25 PROCEDURE — 99999 PR PBB SHADOW E&M-EST. PATIENT-LVL IV: ICD-10-PCS | Mod: PBBFAC,,, | Performed by: OPTOMETRIST

## 2021-10-25 PROCEDURE — 99214 OFFICE O/P EST MOD 30 MIN: CPT | Mod: PBBFAC | Performed by: OPTOMETRIST

## 2021-10-25 PROCEDURE — 99999 PR PBB SHADOW E&M-EST. PATIENT-LVL IV: CPT | Mod: PBBFAC,,, | Performed by: OPTOMETRIST

## 2021-10-29 ENCOUNTER — CLINICAL SUPPORT (OUTPATIENT)
Dept: REHABILITATION | Facility: OTHER | Age: 86
End: 2021-10-29
Payer: MEDICARE

## 2021-10-29 ENCOUNTER — TELEPHONE (OUTPATIENT)
Dept: OPTOMETRY | Facility: CLINIC | Age: 86
End: 2021-10-29
Payer: MEDICARE

## 2021-10-29 DIAGNOSIS — R29.898 DECREASED ROM OF NECK: ICD-10-CM

## 2021-10-29 PROCEDURE — 97140 MANUAL THERAPY 1/> REGIONS: CPT | Mod: KX,PN

## 2021-10-29 PROCEDURE — 97110 THERAPEUTIC EXERCISES: CPT | Mod: KX,PN

## 2021-10-31 ENCOUNTER — EXTERNAL CHRONIC CARE MANAGEMENT (OUTPATIENT)
Dept: PRIMARY CARE CLINIC | Facility: CLINIC | Age: 86
End: 2021-10-31
Payer: MEDICARE

## 2021-10-31 PROCEDURE — 99490 CHRNC CARE MGMT STAFF 1ST 20: CPT | Mod: PBBFAC | Performed by: FAMILY MEDICINE

## 2021-10-31 PROCEDURE — 99490 CHRNC CARE MGMT STAFF 1ST 20: CPT | Mod: S$PBB,,, | Performed by: FAMILY MEDICINE

## 2021-10-31 PROCEDURE — 99490 PR CHRONIC CARE MGMT, 1ST 20 MIN: ICD-10-PCS | Mod: S$PBB,,, | Performed by: FAMILY MEDICINE

## 2021-11-02 ENCOUNTER — CLINICAL SUPPORT (OUTPATIENT)
Dept: REHABILITATION | Facility: OTHER | Age: 86
End: 2021-11-02
Payer: MEDICARE

## 2021-11-02 ENCOUNTER — OFFICE VISIT (OUTPATIENT)
Dept: PAIN MEDICINE | Facility: CLINIC | Age: 86
End: 2021-11-02
Payer: MEDICARE

## 2021-11-02 DIAGNOSIS — R29.898 DECREASED ROM OF NECK: Primary | ICD-10-CM

## 2021-11-02 DIAGNOSIS — M50.30 DDD (DEGENERATIVE DISC DISEASE), CERVICAL: ICD-10-CM

## 2021-11-02 DIAGNOSIS — M16.12 PRIMARY OSTEOARTHRITIS OF LEFT HIP: ICD-10-CM

## 2021-11-02 DIAGNOSIS — M47.812 CERVICAL SPONDYLOSIS: ICD-10-CM

## 2021-11-02 DIAGNOSIS — G89.4 CHRONIC PAIN DISORDER: Primary | ICD-10-CM

## 2021-11-02 PROCEDURE — 97140 MANUAL THERAPY 1/> REGIONS: CPT | Mod: PN

## 2021-11-02 PROCEDURE — 99213 OFFICE O/P EST LOW 20 MIN: CPT | Mod: 95,,, | Performed by: NURSE PRACTITIONER

## 2021-11-02 PROCEDURE — 99213 PR OFFICE/OUTPT VISIT, EST, LEVL III, 20-29 MIN: ICD-10-PCS | Mod: 95,,, | Performed by: NURSE PRACTITIONER

## 2021-11-02 PROCEDURE — 97110 THERAPEUTIC EXERCISES: CPT | Mod: PN

## 2021-11-04 ENCOUNTER — CLINICAL SUPPORT (OUTPATIENT)
Dept: REHABILITATION | Facility: OTHER | Age: 86
End: 2021-11-04
Payer: MEDICARE

## 2021-11-04 DIAGNOSIS — R29.898 DECREASED ROM OF NECK: Primary | ICD-10-CM

## 2021-11-04 PROCEDURE — 97140 MANUAL THERAPY 1/> REGIONS: CPT | Mod: PN

## 2021-11-04 PROCEDURE — 97110 THERAPEUTIC EXERCISES: CPT | Mod: PN

## 2021-11-09 ENCOUNTER — CLINICAL SUPPORT (OUTPATIENT)
Dept: REHABILITATION | Facility: OTHER | Age: 86
End: 2021-11-09
Payer: MEDICARE

## 2021-11-09 DIAGNOSIS — R29.898 DECREASED ROM OF NECK: Primary | ICD-10-CM

## 2021-11-09 PROCEDURE — 97110 THERAPEUTIC EXERCISES: CPT | Mod: PN

## 2021-11-09 PROCEDURE — 97140 MANUAL THERAPY 1/> REGIONS: CPT | Mod: PN

## 2021-11-11 ENCOUNTER — CLINICAL SUPPORT (OUTPATIENT)
Dept: REHABILITATION | Facility: OTHER | Age: 86
End: 2021-11-11
Payer: MEDICARE

## 2021-11-11 DIAGNOSIS — R29.898 DECREASED ROM OF NECK: Primary | ICD-10-CM

## 2021-11-11 PROCEDURE — 97110 THERAPEUTIC EXERCISES: CPT | Mod: PN

## 2021-11-11 PROCEDURE — 97140 MANUAL THERAPY 1/> REGIONS: CPT | Mod: PN

## 2021-11-16 ENCOUNTER — CLINICAL SUPPORT (OUTPATIENT)
Dept: REHABILITATION | Facility: OTHER | Age: 86
End: 2021-11-16
Payer: MEDICARE

## 2021-11-16 DIAGNOSIS — R29.898 DECREASED ROM OF NECK: Primary | ICD-10-CM

## 2021-11-16 PROCEDURE — 97140 MANUAL THERAPY 1/> REGIONS: CPT | Mod: PN

## 2021-11-16 PROCEDURE — 97110 THERAPEUTIC EXERCISES: CPT | Mod: PN

## 2021-11-18 ENCOUNTER — CLINICAL SUPPORT (OUTPATIENT)
Dept: REHABILITATION | Facility: OTHER | Age: 86
End: 2021-11-18
Payer: MEDICARE

## 2021-11-18 DIAGNOSIS — R29.898 DECREASED ROM OF NECK: Primary | ICD-10-CM

## 2021-11-18 PROCEDURE — 97110 THERAPEUTIC EXERCISES: CPT | Mod: PN

## 2021-11-22 ENCOUNTER — OFFICE VISIT (OUTPATIENT)
Dept: OPHTHALMOLOGY | Facility: CLINIC | Age: 86
End: 2021-11-22
Payer: MEDICARE

## 2021-11-22 ENCOUNTER — CLINICAL SUPPORT (OUTPATIENT)
Dept: OPHTHALMOLOGY | Facility: CLINIC | Age: 86
End: 2021-11-22
Payer: MEDICARE

## 2021-11-22 DIAGNOSIS — H35.352 CYSTOID MACULAR EDEMA, LEFT EYE: ICD-10-CM

## 2021-11-22 DIAGNOSIS — Z79.899 LONG-TERM USE OF PLAQUENIL: ICD-10-CM

## 2021-11-22 DIAGNOSIS — H35.012 RETINAL MACROANEURYSM OF LEFT EYE: ICD-10-CM

## 2021-11-22 DIAGNOSIS — M05.79 SEROPOSITIVE RHEUMATOID ARTHRITIS OF MULTIPLE SITES: ICD-10-CM

## 2021-11-22 PROCEDURE — 99999 PR PBB SHADOW E&M-EST. PATIENT-LVL IV: ICD-10-PCS | Mod: PBBFAC,,, | Performed by: OPHTHALMOLOGY

## 2021-11-22 PROCEDURE — 92201 OPSCPY EXTND RTA DRAW UNI/BI: CPT | Mod: S$PBB,,, | Performed by: OPHTHALMOLOGY

## 2021-11-22 PROCEDURE — 92201 OPSCPY EXTND RTA DRAW UNI/BI: CPT | Mod: PBBFAC | Performed by: OPHTHALMOLOGY

## 2021-11-22 PROCEDURE — 92004 PR EYE EXAM, NEW PATIENT,COMPREHESV: ICD-10-PCS | Mod: S$PBB,,, | Performed by: OPHTHALMOLOGY

## 2021-11-22 PROCEDURE — 92004 COMPRE OPH EXAM NEW PT 1/>: CPT | Mod: S$PBB,,, | Performed by: OPHTHALMOLOGY

## 2021-11-22 PROCEDURE — 92201 PR OPHTHALMOSCOPY, EXT, W/RET DRAW/SCLERAL DEPR, I&R, UNI/BI: ICD-10-PCS | Mod: S$PBB,,, | Performed by: OPHTHALMOLOGY

## 2021-11-22 PROCEDURE — 99214 OFFICE O/P EST MOD 30 MIN: CPT | Mod: PBBFAC | Performed by: OPHTHALMOLOGY

## 2021-11-22 PROCEDURE — 99999 PR PBB SHADOW E&M-EST. PATIENT-LVL IV: CPT | Mod: PBBFAC,,, | Performed by: OPHTHALMOLOGY

## 2021-11-23 ENCOUNTER — CLINICAL SUPPORT (OUTPATIENT)
Dept: REHABILITATION | Facility: OTHER | Age: 86
End: 2021-11-23
Payer: MEDICARE

## 2021-11-23 DIAGNOSIS — R29.898 DECREASED ROM OF NECK: Primary | ICD-10-CM

## 2021-11-23 PROCEDURE — 97110 THERAPEUTIC EXERCISES: CPT | Mod: PN

## 2021-11-30 ENCOUNTER — CLINICAL SUPPORT (OUTPATIENT)
Dept: REHABILITATION | Facility: OTHER | Age: 86
End: 2021-11-30
Payer: MEDICARE

## 2021-11-30 ENCOUNTER — EXTERNAL CHRONIC CARE MANAGEMENT (OUTPATIENT)
Dept: PRIMARY CARE CLINIC | Facility: CLINIC | Age: 86
End: 2021-11-30
Payer: MEDICARE

## 2021-11-30 DIAGNOSIS — R29.898 DECREASED ROM OF NECK: Primary | ICD-10-CM

## 2021-11-30 PROCEDURE — 99490 CHRNC CARE MGMT STAFF 1ST 20: CPT | Mod: PBBFAC | Performed by: FAMILY MEDICINE

## 2021-11-30 PROCEDURE — 99490 PR CHRONIC CARE MGMT, 1ST 20 MIN: ICD-10-PCS | Mod: S$PBB,,, | Performed by: FAMILY MEDICINE

## 2021-11-30 PROCEDURE — 97110 THERAPEUTIC EXERCISES: CPT | Mod: PN

## 2021-11-30 PROCEDURE — 99490 CHRNC CARE MGMT STAFF 1ST 20: CPT | Mod: S$PBB,,, | Performed by: FAMILY MEDICINE

## 2021-12-02 NOTE — PT/OT/SLP PROGRESS
"Speech Language Pathology Treatment    Patient Name:  Selma Lux MD   MRN:  [id number]  Admitting Diagnosis: Pneumonia of both lungs due to infectious organism    Recommendations:                 General Recommendations:  Dysphagia therapy  Diet recommendations:  NPO, Liquid Diet Level: NPO   Aspiration Precautions: Continue alternate means of nutrition, Ice chips sparingly and Strict aspiration precautions   General Precautions: Standard, fall, aspiration, NPO  Communication strategies:  none    Subjective     Pt found in bed with pt's daughter at bedside. Pt was awake, alert and oriented to name,  and situation. Pt agreed to participate in dysphagia tx,    Patient goals: "Let's get started then" per pt     Pain/Comfort:  · Pain Rating 1: 0/10  · Pain Rating Post-Intervention 1: 0/10    Objective:     Has the patient been evaluated by SLP for swallowing?   Yes  Keep patient NPO? Yes   Current Respiratory Status: room air      Pt participated in indirect dysphagia exercises to strengthen musculature to improve swallowing mechanism. Pt completed lingual strengthening exercises (production of hard /k/ sounds 10x) and resistance exercises (tongue pushing against spoon for 3s 10x) to improve base of tongue musculature. Pt completed laryngeal lift with transitioning from /o/ vowel sound to /i/ vowel  10x. Pt also completed laryngeal elevation exercises (falsetto /i/) 10x. Pt demonstrated slight difficulty with keeping tongue between teeth while performing dry swallow during dianne exercise. Pt demonstrated mod-max effort during all exercises.   SLP educated pt and pt's daughter on importance/purpose of performing dysphagia exercises at least 3x daily. Pt and pt's daughter acknowledged and confirmed understanding. Pt's whiteboard is up to date.       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  She presents with high risk of aspiration..    Goals:    SLP " Physical Therapy Daily Progress Note    Patient Information  Raquel Guzman  1997      Visit # : 12    Raquel Guzman reports 2-3/10 pain today at rest.  Patient reports that her shoulder seems to be doing some better. She states that when she lifts heavy boxes at work it causes her shoulder to hurt some. Patient reports that she still has a lot of stiffness.         Objective Pt presents to PT today with no distress noted.     L shoulder AROM   Flexion: 160  Abduction: 155    Patient with tightness noted in L upper trap.     Mild tenderness to palpation in cervical paraspinals today.       See Exercise, Manual, and Modality Logs for complete treatment.     Assessment/Plan  Patient tolerated session well with no increases in pain with exercises. Patient with no tenderness to palpation in L shoulder today. Patient continues to demonstrate weakness in L shoulder and has moderate fatigue at completion of session. Patient has been inconsistent with therapy but is demonstrating improvement. Patient would benefit from the continuation of therapy services to address strength limitations. Patient tolerated manual therapy well today and is demonstrating good ROM in L shoulder.       Progress per Plan of Care  PT will continue to monitor patients signs and symptoms and progress patient as tolerated.     Visit Diagnoses:    ICD-10-CM ICD-9-CM   1. Chronic left shoulder pain  M25.512 719.41    G89.29 338.29   2. Status post labral repair of shoulder  Z98.890 V45.89            Manual Therapy:    14     mins  33989;  Therapeutic Exercise:         mins  39283;     Neuromuscular Ruy:        mins  62933;    Therapeutic Activity:     17     mins  67603;     Gait Training:           mins  79559;     Ultrasound:          mins  42927;    Electrical Stimulation:         mins  13104 ( );  Dry Needling          mins self-pay    Timed Treatment:   31   mins   Total Treatment:     57   mins          Catina Yu PT  Physical  Goals        Problem: SLP Goal    Goal Priority Disciplines Outcome   SLP Goal     SLP Ongoing (interventions implemented as appropriate)   Description:  Speech Therapy Short Term Goals  Goal expected to be met by 5/2  1. Patient will complete dysphagia therapy exercises x10 with min cues.    Speech Language Pathology Goals  Goals expected to be met by 4/30  1. Patient will participate in ongoing swallow assessment                        Plan:     · Patient to be seen:  5 x/week   · Plan of Care expires:  05/23/18  · Plan of Care reviewed with:  patient, daughter   · SLP Follow-Up:  Yes       Discharge recommendations:  nursing facility, skilled       Time Tracking:     SLP Treatment Date:   05/01/18  Speech Start Time:  1443  Speech Stop Time:  1453     Speech Total Time (min):  10 min    Billable Minutes: Treatment Swallowing Dysfunction 10    JESSICA Serna, CF-SLP  Speech-Language Pathologist   5/1/2018     Therapist

## 2021-12-06 ENCOUNTER — TELEPHONE (OUTPATIENT)
Dept: NEUROLOGY | Facility: CLINIC | Age: 86
End: 2021-12-06
Payer: MEDICARE

## 2021-12-06 ENCOUNTER — PATIENT MESSAGE (OUTPATIENT)
Dept: HOME HEALTH SERVICES | Facility: CLINIC | Age: 86
End: 2021-12-06
Payer: MEDICARE

## 2021-12-06 ENCOUNTER — PATIENT MESSAGE (OUTPATIENT)
Dept: NEUROLOGY | Facility: CLINIC | Age: 86
End: 2021-12-06
Payer: MEDICARE

## 2021-12-06 DIAGNOSIS — G43.719 INTRACTABLE CHRONIC MIGRAINE WITHOUT AURA AND WITHOUT STATUS MIGRAINOSUS: Primary | ICD-10-CM

## 2021-12-06 RX ORDER — RIMEGEPANT SULFATE 75 MG/75MG
75 TABLET, ORALLY DISINTEGRATING ORAL ONCE AS NEEDED
Qty: 8 TABLET | Refills: 6 | Status: SHIPPED | OUTPATIENT
Start: 2021-12-06 | End: 2021-12-06

## 2021-12-07 ENCOUNTER — CARE AT HOME (OUTPATIENT)
Dept: HOME HEALTH SERVICES | Facility: CLINIC | Age: 86
End: 2021-12-07
Payer: MEDICARE

## 2021-12-07 DIAGNOSIS — Z86.73 HISTORY OF STROKE: Primary | ICD-10-CM

## 2021-12-07 PROCEDURE — 99350 PR HOME VISIT,ESTAB PATIENT,LEVEL IV: ICD-10-PCS | Mod: S$GLB,,, | Performed by: NURSE PRACTITIONER

## 2021-12-07 PROCEDURE — 99350 HOME/RES VST EST HIGH MDM 60: CPT | Mod: S$GLB,,, | Performed by: NURSE PRACTITIONER

## 2021-12-07 RX ORDER — SUMATRIPTAN 50 MG/1
50 TABLET, FILM COATED ORAL EVERY 6 HOURS PRN
Qty: 30 TABLET | Refills: 1 | Status: SHIPPED | OUTPATIENT
Start: 2021-12-07 | End: 2022-04-11 | Stop reason: ALTCHOICE

## 2021-12-07 RX ORDER — AMOXICILLIN AND CLAVULANATE POTASSIUM 875; 125 MG/1; MG/1
1 TABLET, FILM COATED ORAL 2 TIMES DAILY
Qty: 14 TABLET | Refills: 0 | Status: SHIPPED | OUTPATIENT
Start: 2021-12-07 | End: 2021-12-14

## 2021-12-08 ENCOUNTER — TELEPHONE (OUTPATIENT)
Dept: NEUROLOGY | Facility: CLINIC | Age: 86
End: 2021-12-08
Payer: MEDICARE

## 2021-12-09 ENCOUNTER — HOSPITAL ENCOUNTER (EMERGENCY)
Facility: OTHER | Age: 86
Discharge: HOME | End: 2021-12-09
Attending: EMERGENCY MEDICINE
Payer: MEDICARE

## 2021-12-09 VITALS
BODY MASS INDEX: 25.78 KG/M2 | HEART RATE: 90 BPM | OXYGEN SATURATION: 100 % | SYSTOLIC BLOOD PRESSURE: 142 MMHG | DIASTOLIC BLOOD PRESSURE: 72 MMHG | WEIGHT: 151 LBS | TEMPERATURE: 98 F | RESPIRATION RATE: 18 BRPM | HEIGHT: 64 IN
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
## 2021-12-09 DIAGNOSIS — Z79.01 CHRONIC ANTICOAGULATION: ICD-10-CM

## 2021-12-09 PROCEDURE — 25000003 PHARM REV CODE 250: Performed by: EMERGENCY MEDICINE

## 2021-12-09 PROCEDURE — 99284 EMERGENCY DEPT VISIT MOD MDM: CPT | Mod: 25

## 2021-12-09 RX ORDER — BUTALBITAL, ACETAMINOPHEN AND CAFFEINE 50; 325; 40 MG/1; MG/1; MG/1
1 TABLET ORAL
Status: COMPLETED | OUTPATIENT
Start: 2021-12-09 | End: 2021-12-09

## 2021-12-09 RX ADMIN — BUTALBITAL, ACETAMINOPHEN, AND CAFFEINE 1 TABLET: 50; 325; 40 TABLET ORAL at 09:12

## 2021-12-10 ENCOUNTER — DOCUMENTATION ONLY (OUTPATIENT)
Dept: REHABILITATION | Facility: OTHER | Age: 86
End: 2021-12-10
Payer: MEDICARE

## 2021-12-13 ENCOUNTER — HOSPITAL ENCOUNTER (INPATIENT)
Facility: HOSPITAL | Age: 86
LOS: 22 days | Discharge: NURSING, REHAB OR LONG-TERM CARE | DRG: 082 | End: 2022-01-04
Attending: EMERGENCY MEDICINE | Admitting: PSYCHIATRY & NEUROLOGY
Payer: MEDICARE

## 2021-12-13 DIAGNOSIS — G93.40 ENCEPHALOPATHY: ICD-10-CM

## 2021-12-13 DIAGNOSIS — E87.1 HYPONATREMIA: ICD-10-CM

## 2021-12-13 DIAGNOSIS — S06.5XAA ACUTE SUBDURAL HEMATOMA: ICD-10-CM

## 2021-12-13 DIAGNOSIS — I46.9 CARDIAC ARREST: ICD-10-CM

## 2021-12-13 DIAGNOSIS — R54 AGE-RELATED PHYSICAL DEBILITY: ICD-10-CM

## 2021-12-13 DIAGNOSIS — I63.9 STROKE: ICD-10-CM

## 2021-12-13 DIAGNOSIS — D72.829 LEUKOCYTOSIS, UNSPECIFIED TYPE: ICD-10-CM

## 2021-12-13 DIAGNOSIS — M06.9 RHEUMATOID ARTHRITIS, INVOLVING UNSPECIFIED SITE, UNSPECIFIED WHETHER RHEUMATOID FACTOR PRESENT: ICD-10-CM

## 2021-12-13 DIAGNOSIS — R25.3 TWITCHING: ICD-10-CM

## 2021-12-13 DIAGNOSIS — R41.0 DELIRIUM: ICD-10-CM

## 2021-12-13 DIAGNOSIS — S06.5XAA SDH (SUBDURAL HEMATOMA): Primary | ICD-10-CM

## 2021-12-13 DIAGNOSIS — I63.9 CEREBROVASCULAR ACCIDENT (CVA), UNSPECIFIED MECHANISM: ICD-10-CM

## 2021-12-13 LAB
ALBUMIN SERPL BCP-MCNC: 4 G/DL (ref 3.5–5.2)
ALLENS TEST: ABNORMAL
ALLENS TEST: ABNORMAL
ALP SERPL-CCNC: 62 U/L (ref 55–135)
ALT SERPL W/O P-5'-P-CCNC: 18 U/L (ref 10–44)
ANION GAP SERPL CALC-SCNC: 13 MMOL/L (ref 8–16)
ANISOCYTOSIS BLD QL SMEAR: SLIGHT
APTT BLDCRRT: 28.6 SEC (ref 21–32)
ASCENDING AORTA: 2.95 CM
AST SERPL-CCNC: 20 U/L (ref 10–40)
BASOPHILS # BLD AUTO: 0.02 K/UL (ref 0–0.2)
BASOPHILS NFR BLD: 0.1 % (ref 0–1.9)
BILIRUB SERPL-MCNC: 2.2 MG/DL (ref 0.1–1)
BUN SERPL-MCNC: 13 MG/DL (ref 8–23)
BUN SERPL-MCNC: 14 MG/DL (ref 6–30)
BURR CELLS BLD QL SMEAR: ABNORMAL
CALCIUM SERPL-MCNC: 9.2 MG/DL (ref 8.7–10.5)
CHLORIDE SERPL-SCNC: 83 MMOL/L (ref 95–110)
CHLORIDE SERPL-SCNC: 84 MMOL/L (ref 95–110)
CHOLEST SERPL-MCNC: 116 MG/DL (ref 120–199)
CHOLEST/HDLC SERPL: 1.7 {RATIO} (ref 2–5)
CK SERPL-CCNC: 123 U/L (ref 20–180)
CO2 SERPL-SCNC: 23 MMOL/L (ref 23–29)
CREAT SERPL-MCNC: 0.8 MG/DL (ref 0.5–1.4)
CREAT SERPL-MCNC: 0.8 MG/DL (ref 0.5–1.4)
CREAT SERPL-MCNC: 0.9 MG/DL (ref 0.5–1.4)
CTP QC/QA: YES
CV ECHO LV RWT: 0.65 CM
DELSYS: ABNORMAL
DELSYS: ABNORMAL
DIFFERENTIAL METHOD: ABNORMAL
DOP CALC LVOT AREA: 3.1 CM2
DOP CALC LVOT DIAMETER: 1.99 CM
ECHO LV POSTERIOR WALL: 1.15 CM (ref 0.6–1.1)
EJECTION FRACTION: 65 %
EOSINOPHIL # BLD AUTO: 0.1 K/UL (ref 0–0.5)
EOSINOPHIL NFR BLD: 0.8 % (ref 0–8)
ERYTHROCYTE [DISTWIDTH] IN BLOOD BY AUTOMATED COUNT: 15.7 % (ref 11.5–14.5)
EST. GFR  (AFRICAN AMERICAN): >60 ML/MIN/1.73 M^2
EST. GFR  (NON AFRICAN AMERICAN): >60 ML/MIN/1.73 M^2
ESTIMATED AVG GLUCOSE: 105 MG/DL (ref 68–131)
FRACTIONAL SHORTENING: 23 % (ref 28–44)
GLUCOSE SERPL-MCNC: 90 MG/DL (ref 70–110)
GLUCOSE SERPL-MCNC: 96 MG/DL (ref 70–110)
HBA1C MFR BLD: 5.3 % (ref 4–5.6)
HCO3 UR-SCNC: 23.6 MMOL/L (ref 24–28)
HCO3 UR-SCNC: 26.6 MMOL/L (ref 24–28)
HCT VFR BLD AUTO: 37 % (ref 37–48.5)
HCT VFR BLD CALC: 39 %PCV (ref 36–54)
HDLC SERPL-MCNC: 67 MG/DL (ref 40–75)
HDLC SERPL: 57.8 % (ref 20–50)
HGB BLD-MCNC: 12 G/DL (ref 12–16)
IMM GRANULOCYTES # BLD AUTO: 0.28 K/UL (ref 0–0.04)
IMM GRANULOCYTES NFR BLD AUTO: 1.8 % (ref 0–0.5)
INR PPP: 1 (ref 0.8–1.2)
INTERVENTRICULAR SEPTUM: 1.1 CM (ref 0.6–1.1)
LA MAJOR: 5.25 CM
LA MINOR: 4.14 CM
LA WIDTH: 3.09 CM
LDLC SERPL CALC-MCNC: 38.4 MG/DL (ref 63–159)
LEFT ATRIUM SIZE: 3.76 CM
LEFT ATRIUM VOLUME: 45.72 CM3
LEFT INTERNAL DIMENSION IN SYSTOLE: 2.73 CM (ref 2.1–4)
LEFT VENTRICLE DIASTOLIC VOLUME: 53.11 ML
LEFT VENTRICLE SYSTOLIC VOLUME: 27.65 ML
LEFT VENTRICULAR INTERNAL DIMENSION IN DIASTOLE: 3.56 CM (ref 3.5–6)
LEFT VENTRICULAR MASS: 126.24 G
LYMPHOCYTES # BLD AUTO: 1.9 K/UL (ref 1–4.8)
LYMPHOCYTES NFR BLD: 12.5 % (ref 18–48)
MAGNESIUM SERPL-MCNC: 1.7 MG/DL (ref 1.6–2.6)
MCH RBC QN AUTO: 28.5 PG (ref 27–31)
MCHC RBC AUTO-ENTMCNC: 32.4 G/DL (ref 32–36)
MCV RBC AUTO: 88 FL (ref 82–98)
MONOCYTES # BLD AUTO: 3.4 K/UL (ref 0.3–1)
MONOCYTES NFR BLD: 21.8 % (ref 4–15)
NEUTROPHILS # BLD AUTO: 9.8 K/UL (ref 1.8–7.7)
NEUTROPHILS NFR BLD: 63 % (ref 38–73)
NONHDLC SERPL-MCNC: 49 MG/DL
NRBC BLD-RTO: 0 /100 WBC
PCO2 BLDA: 33.7 MMHG (ref 35–45)
PCO2 BLDA: 37.2 MMHG (ref 35–45)
PH SMN: 7.45 [PH] (ref 7.35–7.45)
PH SMN: 7.46 [PH] (ref 7.35–7.45)
PHOSPHATE SERPL-MCNC: 2.9 MG/DL (ref 2.7–4.5)
PLATELET # BLD AUTO: 140 K/UL (ref 150–450)
PLATELET BLD QL SMEAR: ABNORMAL
PMV BLD AUTO: 10 FL (ref 9.2–12.9)
PO2 BLDA: 113 MMHG (ref 80–100)
PO2 BLDA: 92 MMHG (ref 80–100)
POC BE: 0 MMOL/L
POC BE: 3 MMOL/L
POC IONIZED CALCIUM: 1.13 MMOL/L (ref 1.06–1.42)
POC PTINR: 1.3 (ref 0.9–1.2)
POC PTWBT: 15.8 SEC (ref 9.7–14.3)
POC SATURATED O2: 98 % (ref 95–100)
POC SATURATED O2: 99 % (ref 95–100)
POC TCO2 (MEASURED): 27 MMOL/L (ref 23–29)
POC TCO2: 25 MMOL/L (ref 23–27)
POC TCO2: 28 MMOL/L (ref 23–27)
POIKILOCYTOSIS BLD QL SMEAR: SLIGHT
POLYCHROMASIA BLD QL SMEAR: ABNORMAL
POTASSIUM BLD-SCNC: 4.3 MMOL/L (ref 3.5–5.1)
POTASSIUM SERPL-SCNC: 4.3 MMOL/L (ref 3.5–5.1)
PROT SERPL-MCNC: 6.6 G/DL (ref 6–8.4)
PROTHROMBIN TIME: 11.2 SEC (ref 9–12.5)
RA MAJOR: 3.8 CM
RA PRESSURE: 3 MMHG
RA WIDTH: 2.41 CM
RBC # BLD AUTO: 4.21 M/UL (ref 4–5.4)
RIGHT VENTRICULAR END-DIASTOLIC DIMENSION: 2.95 CM
SAMPLE: ABNORMAL
SAMPLE: NORMAL
SARS-COV-2 RDRP RESP QL NAA+PROBE: NEGATIVE
SINUS: 2.7 CM
SITE: ABNORMAL
SITE: ABNORMAL
SODIUM BLD-SCNC: 120 MMOL/L (ref 136–145)
SODIUM SERPL-SCNC: 118 MMOL/L (ref 136–145)
SODIUM SERPL-SCNC: 120 MMOL/L (ref 136–145)
SODIUM SERPL-SCNC: 121 MMOL/L (ref 136–145)
SODIUM SERPL-SCNC: 122 MMOL/L (ref 136–145)
SODIUM SERPL-SCNC: 122 MMOL/L (ref 136–145)
SODIUM SERPL-SCNC: 124 MMOL/L (ref 136–145)
STJ: 2.79 CM
TDI LATERAL: 0.08 M/S
TDI SEPTAL: 0.07 M/S
TDI: 0.08 M/S
TRICUSPID ANNULAR PLANE SYSTOLIC EXCURSION: 2.23 CM
TRIGL SERPL-MCNC: 53 MG/DL (ref 30–150)
TSH SERPL DL<=0.005 MIU/L-ACNC: 2.08 UIU/ML (ref 0.4–4)
WBC # BLD AUTO: 15.56 K/UL (ref 3.9–12.7)

## 2021-12-13 PROCEDURE — 63600175 PHARM REV CODE 636 W HCPCS: Performed by: NURSE PRACTITIONER

## 2021-12-13 PROCEDURE — 25500020 PHARM REV CODE 255: Performed by: EMERGENCY MEDICINE

## 2021-12-13 PROCEDURE — 36620 INSERTION CATHETER ARTERY: CPT | Mod: GC,,, | Performed by: PSYCHIATRY & NEUROLOGY

## 2021-12-13 PROCEDURE — 84295 ASSAY OF SERUM SODIUM: CPT | Performed by: NURSE PRACTITIONER

## 2021-12-13 PROCEDURE — 20000000 HC ICU ROOM

## 2021-12-13 PROCEDURE — 27000221 HC OXYGEN, UP TO 24 HOURS

## 2021-12-13 PROCEDURE — 25000003 PHARM REV CODE 250: Performed by: STUDENT IN AN ORGANIZED HEALTH CARE EDUCATION/TRAINING PROGRAM

## 2021-12-13 PROCEDURE — 36556 PR INSERT NON-TUNNEL CV CATH 5+ YRS OLD: ICD-10-PCS | Mod: ,,, | Performed by: PSYCHIATRY & NEUROLOGY

## 2021-12-13 PROCEDURE — 84443 ASSAY THYROID STIM HORMONE: CPT | Performed by: EMERGENCY MEDICINE

## 2021-12-13 PROCEDURE — 83036 HEMOGLOBIN GLYCOSYLATED A1C: CPT | Performed by: EMERGENCY MEDICINE

## 2021-12-13 PROCEDURE — 80053 COMPREHEN METABOLIC PANEL: CPT | Performed by: EMERGENCY MEDICINE

## 2021-12-13 PROCEDURE — 80061 LIPID PANEL: CPT | Performed by: EMERGENCY MEDICINE

## 2021-12-13 PROCEDURE — 93010 EKG 12-LEAD: ICD-10-PCS | Mod: ,,, | Performed by: INTERNAL MEDICINE

## 2021-12-13 PROCEDURE — 25000003 PHARM REV CODE 250: Performed by: NURSE PRACTITIONER

## 2021-12-13 PROCEDURE — 25000003 PHARM REV CODE 250: Performed by: PSYCHIATRY & NEUROLOGY

## 2021-12-13 PROCEDURE — 36556 INSERT NON-TUNNEL CV CATH: CPT | Mod: ,,, | Performed by: PSYCHIATRY & NEUROLOGY

## 2021-12-13 PROCEDURE — 96365 THER/PROPH/DIAG IV INF INIT: CPT

## 2021-12-13 PROCEDURE — 36620 INSERTION CATHETER ARTERY: CPT | Mod: ,,,

## 2021-12-13 PROCEDURE — A4216 STERILE WATER/SALINE, 10 ML: HCPCS | Performed by: PSYCHIATRY & NEUROLOGY

## 2021-12-13 PROCEDURE — 63600175 PHARM REV CODE 636 W HCPCS

## 2021-12-13 PROCEDURE — 63600175 PHARM REV CODE 636 W HCPCS: Performed by: PSYCHIATRY & NEUROLOGY

## 2021-12-13 PROCEDURE — 99291 CRITICAL CARE FIRST HOUR: CPT | Mod: CS,,, | Performed by: EMERGENCY MEDICINE

## 2021-12-13 PROCEDURE — 83735 ASSAY OF MAGNESIUM: CPT | Performed by: NURSE PRACTITIONER

## 2021-12-13 PROCEDURE — 99900035 HC TECH TIME PER 15 MIN (STAT)

## 2021-12-13 PROCEDURE — 63600175 PHARM REV CODE 636 W HCPCS: Performed by: PHYSICIAN ASSISTANT

## 2021-12-13 PROCEDURE — 99223 1ST HOSP IP/OBS HIGH 75: CPT | Mod: GC,,, | Performed by: NEUROLOGICAL SURGERY

## 2021-12-13 PROCEDURE — 82803 BLOOD GASES ANY COMBINATION: CPT

## 2021-12-13 PROCEDURE — 36620 ARTERIAL LINE: ICD-10-PCS | Mod: ,,,

## 2021-12-13 PROCEDURE — 85025 COMPLETE CBC W/AUTO DIFF WBC: CPT | Performed by: EMERGENCY MEDICINE

## 2021-12-13 PROCEDURE — 85610 PROTHROMBIN TIME: CPT | Performed by: STUDENT IN AN ORGANIZED HEALTH CARE EDUCATION/TRAINING PROGRAM

## 2021-12-13 PROCEDURE — 85610 PROTHROMBIN TIME: CPT

## 2021-12-13 PROCEDURE — U0002 COVID-19 LAB TEST NON-CDC: HCPCS | Performed by: STUDENT IN AN ORGANIZED HEALTH CARE EDUCATION/TRAINING PROGRAM

## 2021-12-13 PROCEDURE — 93010 ELECTROCARDIOGRAM REPORT: CPT | Mod: ,,, | Performed by: INTERNAL MEDICINE

## 2021-12-13 PROCEDURE — 84295 ASSAY OF SERUM SODIUM: CPT | Mod: 91 | Performed by: PSYCHIATRY & NEUROLOGY

## 2021-12-13 PROCEDURE — 85730 THROMBOPLASTIN TIME PARTIAL: CPT | Performed by: STUDENT IN AN ORGANIZED HEALTH CARE EDUCATION/TRAINING PROGRAM

## 2021-12-13 PROCEDURE — 37799 UNLISTED PX VASCULAR SURGERY: CPT

## 2021-12-13 PROCEDURE — 99291 CRITICAL CARE FIRST HOUR: CPT

## 2021-12-13 PROCEDURE — 82565 ASSAY OF CREATININE: CPT

## 2021-12-13 PROCEDURE — 99223 PR INITIAL HOSPITAL CARE,LEVL III: ICD-10-PCS | Mod: GC,,, | Performed by: NEUROLOGICAL SURGERY

## 2021-12-13 PROCEDURE — 94761 N-INVAS EAR/PLS OXIMETRY MLT: CPT

## 2021-12-13 PROCEDURE — 36600 WITHDRAWAL OF ARTERIAL BLOOD: CPT

## 2021-12-13 PROCEDURE — 93005 ELECTROCARDIOGRAM TRACING: CPT

## 2021-12-13 PROCEDURE — 36620 PR INSERT CATH,ART,PERCUT,SHORTTERM: ICD-10-PCS | Mod: GC,,, | Performed by: PSYCHIATRY & NEUROLOGY

## 2021-12-13 PROCEDURE — 63600175 PHARM REV CODE 636 W HCPCS: Performed by: STUDENT IN AN ORGANIZED HEALTH CARE EDUCATION/TRAINING PROGRAM

## 2021-12-13 PROCEDURE — 80047 BASIC METABLC PNL IONIZED CA: CPT

## 2021-12-13 PROCEDURE — 84100 ASSAY OF PHOSPHORUS: CPT | Performed by: NURSE PRACTITIONER

## 2021-12-13 PROCEDURE — 99291 PR CRITICAL CARE, E/M 30-74 MINUTES: ICD-10-PCS | Mod: CS,,, | Performed by: EMERGENCY MEDICINE

## 2021-12-13 PROCEDURE — 82550 ASSAY OF CK (CPK): CPT | Performed by: STUDENT IN AN ORGANIZED HEALTH CARE EDUCATION/TRAINING PROGRAM

## 2021-12-13 RX ORDER — ONDANSETRON 2 MG/ML
4 INJECTION INTRAMUSCULAR; INTRAVENOUS EVERY 6 HOURS PRN
Status: DISCONTINUED | OUTPATIENT
Start: 2021-12-13 | End: 2022-01-04 | Stop reason: HOSPADM

## 2021-12-13 RX ORDER — RIMEGEPANT SULFATE 75 MG/75MG
TABLET, ORALLY DISINTEGRATING ORAL
Status: ON HOLD | COMMUNITY
Start: 2021-12-11 | End: 2022-01-03 | Stop reason: HOSPADM

## 2021-12-13 RX ORDER — ATORVASTATIN CALCIUM 40 MG/1
40 TABLET, FILM COATED ORAL DAILY
COMMUNITY
Start: 2021-11-16 | End: 2022-09-15

## 2021-12-13 RX ORDER — NOREPINEPHRINE BITARTRATE/D5W 4MG/250ML
0-3 PLASTIC BAG, INJECTION (ML) INTRAVENOUS CONTINUOUS
Status: DISCONTINUED | OUTPATIENT
Start: 2021-12-13 | End: 2021-12-13

## 2021-12-13 RX ORDER — APIXABAN 5 MG/1
5 TABLET, FILM COATED ORAL 2 TIMES DAILY
COMMUNITY
Start: 2021-09-23 | End: 2022-06-10

## 2021-12-13 RX ORDER — HYDROXYCHLOROQUINE SULFATE 200 MG/1
200 TABLET, FILM COATED ORAL 2 TIMES DAILY
Status: DISCONTINUED | OUTPATIENT
Start: 2021-12-13 | End: 2021-12-17

## 2021-12-13 RX ORDER — HYDROXYCHLOROQUINE SULFATE 200 MG/1
200 TABLET, FILM COATED ORAL 2 TIMES DAILY
Status: ON HOLD | COMMUNITY
Start: 2021-10-01 | End: 2022-01-03 | Stop reason: HOSPADM

## 2021-12-13 RX ORDER — 3% SODIUM CHLORIDE 3 G/100ML
999 INJECTION, SOLUTION INTRAVENOUS CONTINUOUS
Status: DISCONTINUED | OUTPATIENT
Start: 2021-12-13 | End: 2021-12-13

## 2021-12-13 RX ORDER — ATORVASTATIN CALCIUM 20 MG/1
40 TABLET, FILM COATED ORAL DAILY
Status: DISCONTINUED | OUTPATIENT
Start: 2021-12-13 | End: 2021-12-17

## 2021-12-13 RX ORDER — LEVETIRACETAM 500 MG/5ML
1000 INJECTION, SOLUTION, CONCENTRATE INTRAVENOUS ONCE
Status: COMPLETED | OUTPATIENT
Start: 2021-12-13 | End: 2021-12-13

## 2021-12-13 RX ORDER — ACETAMINOPHEN 325 MG/1
650 TABLET ORAL EVERY 6 HOURS PRN
Status: DISCONTINUED | OUTPATIENT
Start: 2021-12-13 | End: 2021-12-21

## 2021-12-13 RX ORDER — MIDAZOLAM HYDROCHLORIDE 1 MG/ML
4 INJECTION INTRAMUSCULAR; INTRAVENOUS ONCE
Status: COMPLETED | OUTPATIENT
Start: 2021-12-13 | End: 2021-12-13

## 2021-12-13 RX ORDER — MONTELUKAST SODIUM 10 MG/1
10 TABLET ORAL DAILY
Status: DISCONTINUED | OUTPATIENT
Start: 2021-12-13 | End: 2021-12-17

## 2021-12-13 RX ORDER — AMLODIPINE BESYLATE 10 MG/1
10 TABLET ORAL DAILY
COMMUNITY
Start: 2021-10-06 | End: 2022-06-10

## 2021-12-13 RX ORDER — PREDNISONE 5 MG/1
5 TABLET ORAL DAILY
Status: DISCONTINUED | OUTPATIENT
Start: 2021-12-13 | End: 2021-12-14

## 2021-12-13 RX ORDER — DEXMEDETOMIDINE HYDROCHLORIDE 4 UG/ML
0-1.4 INJECTION, SOLUTION INTRAVENOUS CONTINUOUS
Status: DISCONTINUED | OUTPATIENT
Start: 2021-12-13 | End: 2021-12-13

## 2021-12-13 RX ORDER — FENTANYL CITRATE 50 UG/ML
15 INJECTION, SOLUTION INTRAMUSCULAR; INTRAVENOUS ONCE
Status: DISCONTINUED | OUTPATIENT
Start: 2021-12-13 | End: 2021-12-15

## 2021-12-13 RX ORDER — MIDAZOLAM HYDROCHLORIDE 1 MG/ML
INJECTION INTRAMUSCULAR; INTRAVENOUS
Status: COMPLETED
Start: 2021-12-13 | End: 2021-12-13

## 2021-12-13 RX ORDER — FUROSEMIDE 40 MG/1
40 TABLET ORAL DAILY
Status: ON HOLD | COMMUNITY
Start: 2021-11-16 | End: 2022-01-03 | Stop reason: HOSPADM

## 2021-12-13 RX ORDER — MYCOPHENOLATE MOFETIL 500 MG/1
500 TABLET ORAL 2 TIMES DAILY
Status: ON HOLD | COMMUNITY
Start: 2021-09-23 | End: 2022-01-03 | Stop reason: HOSPADM

## 2021-12-13 RX ORDER — 3% SODIUM CHLORIDE 3 G/100ML
100 INJECTION, SOLUTION INTRAVENOUS
Status: DISCONTINUED | OUTPATIENT
Start: 2021-12-13 | End: 2021-12-13

## 2021-12-13 RX ORDER — DEXMEDETOMIDINE HYDROCHLORIDE 4 UG/ML
INJECTION, SOLUTION INTRAVENOUS
Status: DISPENSED
Start: 2021-12-13 | End: 2021-12-14

## 2021-12-13 RX ORDER — DEXMEDETOMIDINE HYDROCHLORIDE 4 UG/ML
0.8 INJECTION, SOLUTION INTRAVENOUS CONTINUOUS
Status: DISCONTINUED | OUTPATIENT
Start: 2021-12-13 | End: 2021-12-13

## 2021-12-13 RX ORDER — 3% SODIUM CHLORIDE 3 G/100ML
50 INJECTION, SOLUTION INTRAVENOUS CONTINUOUS
Status: DISCONTINUED | OUTPATIENT
Start: 2021-12-13 | End: 2021-12-13

## 2021-12-13 RX ORDER — NICARDIPINE HYDROCHLORIDE 0.2 MG/ML
0-15 INJECTION INTRAVENOUS CONTINUOUS
Status: DISCONTINUED | OUTPATIENT
Start: 2021-12-13 | End: 2021-12-14

## 2021-12-13 RX ORDER — NOREPINEPHRINE BITARTRATE/D5W 4MG/250ML
0-3 PLASTIC BAG, INJECTION (ML) INTRAVENOUS CONTINUOUS
Status: DISCONTINUED | OUTPATIENT
Start: 2021-12-13 | End: 2021-12-15

## 2021-12-13 RX ORDER — LEVETIRACETAM 500 MG/5ML
1000 INJECTION, SOLUTION, CONCENTRATE INTRAVENOUS
Status: DISCONTINUED | OUTPATIENT
Start: 2021-12-13 | End: 2021-12-13

## 2021-12-13 RX ORDER — DEXMEDETOMIDINE HYDROCHLORIDE 4 UG/ML
0.8 INJECTION, SOLUTION INTRAVENOUS CONTINUOUS
Status: DISCONTINUED | OUTPATIENT
Start: 2021-12-13 | End: 2021-12-17

## 2021-12-13 RX ORDER — LABETALOL HCL 20 MG/4 ML
5 SYRINGE (ML) INTRAVENOUS EVERY 6 HOURS PRN
Status: DISCONTINUED | OUTPATIENT
Start: 2021-12-13 | End: 2022-01-04 | Stop reason: HOSPADM

## 2021-12-13 RX ORDER — MONTELUKAST SODIUM 10 MG/1
10 TABLET ORAL DAILY
COMMUNITY
Start: 2021-10-06 | End: 2023-01-30 | Stop reason: SDUPTHER

## 2021-12-13 RX ORDER — LIDOCAINE HYDROCHLORIDE 10 MG/ML
INJECTION INFILTRATION; PERINEURAL
Status: DISPENSED
Start: 2021-12-13 | End: 2021-12-14

## 2021-12-13 RX ORDER — AMOXICILLIN 250 MG
1 CAPSULE ORAL DAILY
Status: DISCONTINUED | OUTPATIENT
Start: 2021-12-14 | End: 2021-12-17

## 2021-12-13 RX ORDER — MYCOPHENOLATE MOFETIL 250 MG/1
500 CAPSULE ORAL 2 TIMES DAILY
Status: DISCONTINUED | OUTPATIENT
Start: 2021-12-13 | End: 2021-12-18

## 2021-12-13 RX ORDER — PHENYLEPHRINE HCL IN 0.9% NACL 1 MG/10 ML
SYRINGE (ML) INTRAVENOUS
Status: DISPENSED
Start: 2021-12-13 | End: 2021-12-14

## 2021-12-13 RX ORDER — BACLOFEN 10 MG/1
10 TABLET ORAL 3 TIMES DAILY
Status: ON HOLD | COMMUNITY
Start: 2021-11-24 | End: 2022-01-03 | Stop reason: HOSPADM

## 2021-12-13 RX ORDER — PHENYLEPHRINE HYDROCHLORIDE 10 MG/ML
200 INJECTION INTRAVENOUS ONCE
Status: COMPLETED | OUTPATIENT
Start: 2021-12-13 | End: 2021-12-13

## 2021-12-13 RX ORDER — SUMATRIPTAN 50 MG/1
TABLET, FILM COATED ORAL
Status: ON HOLD | COMMUNITY
Start: 2021-12-07 | End: 2022-01-03 | Stop reason: HOSPADM

## 2021-12-13 RX ORDER — AMLODIPINE BESYLATE 10 MG/1
10 TABLET ORAL DAILY
Status: DISCONTINUED | OUTPATIENT
Start: 2021-12-13 | End: 2021-12-17

## 2021-12-13 RX ORDER — 3% SODIUM CHLORIDE 3 G/100ML
30 INJECTION, SOLUTION INTRAVENOUS CONTINUOUS
Status: DISCONTINUED | OUTPATIENT
Start: 2021-12-13 | End: 2021-12-15

## 2021-12-13 RX ORDER — PREDNISONE 5 MG/1
5 TABLET ORAL DAILY
Status: ON HOLD | COMMUNITY
Start: 2021-11-23 | End: 2022-01-03 | Stop reason: HOSPADM

## 2021-12-13 RX ORDER — FENTANYL CITRATE 50 UG/ML
INJECTION, SOLUTION INTRAMUSCULAR; INTRAVENOUS
Status: DISPENSED
Start: 2021-12-13 | End: 2021-12-14

## 2021-12-13 RX ORDER — GABAPENTIN 300 MG/1
300 CAPSULE ORAL DAILY
Status: ON HOLD | COMMUNITY
Start: 2021-10-08 | End: 2022-01-03 | Stop reason: HOSPADM

## 2021-12-13 RX ORDER — BACLOFEN 10 MG/1
10 TABLET ORAL 3 TIMES DAILY
Status: DISCONTINUED | OUTPATIENT
Start: 2021-12-13 | End: 2021-12-17

## 2021-12-13 RX ORDER — IPRATROPIUM BROMIDE AND ALBUTEROL SULFATE 2.5; .5 MG/3ML; MG/3ML
3 SOLUTION RESPIRATORY (INHALATION) EVERY 4 HOURS PRN
Status: DISCONTINUED | OUTPATIENT
Start: 2021-12-13 | End: 2022-01-04 | Stop reason: HOSPADM

## 2021-12-13 RX ORDER — LEVETIRACETAM 500 MG/5ML
500 INJECTION, SOLUTION, CONCENTRATE INTRAVENOUS EVERY 12 HOURS
Status: DISCONTINUED | OUTPATIENT
Start: 2021-12-13 | End: 2021-12-18

## 2021-12-13 RX ADMIN — IOHEXOL 100 ML: 350 INJECTION, SOLUTION INTRAVENOUS at 10:12

## 2021-12-13 RX ADMIN — DEXMEDETOMIDINE HYDROCHLORIDE 0.2 MCG/KG/HR: 4 INJECTION, SOLUTION INTRAVENOUS at 03:12

## 2021-12-13 RX ADMIN — SODIUM CHLORIDE 999 ML/HR: 3 INJECTION, SOLUTION INTRAVENOUS at 03:12

## 2021-12-13 RX ADMIN — LEVETIRACETAM 500 MG: 500 INJECTION, SOLUTION INTRAVENOUS at 09:12

## 2021-12-13 RX ADMIN — SODIUM CHLORIDE 100 ML/HR: 3 INJECTION, SOLUTION INTRAVENOUS at 03:12

## 2021-12-13 RX ADMIN — Medication 0.02 MCG/KG/MIN: at 08:12

## 2021-12-13 RX ADMIN — MIDAZOLAM 3 MG: 1 INJECTION INTRAMUSCULAR; INTRAVENOUS at 03:12

## 2021-12-13 RX ADMIN — PHENYLEPHRINE HYDROCHLORIDE 200 MCG: 10 INJECTION INTRAVENOUS at 04:12

## 2021-12-13 RX ADMIN — LABETALOL HYDROCHLORIDE 5 MG: 5 INJECTION, SOLUTION INTRAVENOUS at 05:12

## 2021-12-13 RX ADMIN — DEXMEDETOMIDINE HYDROCHLORIDE 0.8 MCG/KG/HR: 4 INJECTION, SOLUTION INTRAVENOUS at 08:12

## 2021-12-13 RX ADMIN — NICARDIPINE HYDROCHLORIDE 2.5 MG/HR: 0.2 INJECTION INTRAVENOUS at 05:12

## 2021-12-13 RX ADMIN — SODIUM CHLORIDE 30 ML/HR: 3 INJECTION, SOLUTION INTRAVENOUS at 08:12

## 2021-12-13 RX ADMIN — LEVETIRACETAM 1000 MG: 500 INJECTION, SOLUTION INTRAVENOUS at 12:12

## 2021-12-13 RX ADMIN — MIDAZOLAM HYDROCHLORIDE 3 MG: 1 INJECTION INTRAMUSCULAR; INTRAVENOUS at 03:12

## 2021-12-13 RX ADMIN — NICARDIPINE HYDROCHLORIDE 2.5 MG/HR: 0.2 INJECTION INTRAVENOUS at 12:12

## 2021-12-13 RX ADMIN — SODIUM CHLORIDE 500 ML: 0.9 INJECTION, SOLUTION INTRAVENOUS at 04:12

## 2021-12-13 RX ADMIN — Medication 1626 UNITS: at 03:12

## 2021-12-13 RX ADMIN — Medication 0.02 MCG/KG/MIN: at 05:12

## 2021-12-13 RX ADMIN — SODIUM CHLORIDE 50 ML/HR: 3 INJECTION, SOLUTION INTRAVENOUS at 03:12

## 2021-12-13 RX ADMIN — DEXMEDETOMIDINE HYDROCHLORIDE 0.8 MCG/KG/HR: 4 INJECTION, SOLUTION INTRAVENOUS at 07:12

## 2021-12-13 NOTE — PROCEDURES
Selma Lux is a 86 y.o. female patient.    Temp: 98.7 °F (37.1 °C) (12/13/21 1415)  Pulse: (!) 111 (12/13/21 1500)  Resp: (!) 22 (12/13/21 1500)  BP: 121/68 (12/13/21 1500)  SpO2: 100 % (12/13/21 1500)  Weight: 67 kg (147 lb 11.3 oz) (12/13/21 1240)       Central Line    Date/Time: 12/13/2021 4:09 PM  Performed by: Selwyn Egan MD  Authorized by: Selwyn Egan MD     Location procedure was performed:  [facility]  Consent Done ?:  Yes  Time out complete?: Verified correct patient, procedure, equipment, staff, and site/side    Indications:  Med administration and hemodynamic monitoring  Preparation:  Skin prepped with ChloraPrep  Skin prep agent dried: Skin prep agent completely dried prior to procedure    Sterile barriers: All five maximal sterile barriers used - gloves, gown, cap, mask and large sterile sheet    Hand hygiene: Hand hygiene performed immediately prior to central venous catheter insertion    Location:  Right internal jugular  Catheter type:  Triple lumen  Catheter size:  7.5 Fr  Inserted Catheter Length (cm):  15  Ultrasound guidance: Yes    Vessel Caliber:  Large   patent  Comprressibility:  Normal  Needle advanced into vessel with real time ultrasound guidance.    Guidewire confirmed in vessel.    Steril sheath on probe.    Sterile gel used.  Manometry: Yes    Number of attempts:  1  Securement:  Line sutured, chlorhexidine patch, sterile dressing applied and blood return through all ports  XRay:  Placement verified by x-ray  Adverse Events:  NoneTermination Site: superior vena cava        12/13/2021

## 2021-12-13 NOTE — ED TRIAGE NOTES
Selma Lux, a 86 y.o. female presents to the ED w/ complaint of altered mental status. Pts daughter states that she was acting a little off last night but nothing that far from her normal. Pt was found this morning on the floor only responding to painful stimuli. Pts daughter states that she has not taken her eliquis for 2 days. Pt is on eliquis for stroke prevention per the daughter. Pt has been on antibiotics for a UTI.     Triage note:  Chief Complaint   Patient presents with    Altered Mental Status     LNK last night. Found on floor altered an hour PTA only responding to painful stimuli. Normally responsive and Aox4 per EMS. WC bound. Left sided facial droop noted. Daughter in route. Hx of TIA. On blood thinners.     Review of patient's allergies indicates:  No Known Allergies  No past medical history on file.

## 2021-12-13 NOTE — H&P
[facility]  Neurocritical Care  History & Physical    Admit Date: 12/13/2021  Service Date: 12/13/2021  Length of Stay: 0    Subjective:     Chief Complaint: Acute subdural hematoma    History of Present Illness:   The patient is an 86-year-old female with past medical history of TIA, RA and Afib of Eliquis admitted to [facility] with Right SDH.  She was found on the floor this morning only responding to painful stimuli.  Patient occasionally says her name.GCS 12 noted for EMS.  PCC administered. She had a mechanical fall out of bed on Thursday hitting her head and has been complaining of intermittent headaches since. Her last dose of eliquis was on 12/11. Pending repeat CT head. NSGY consulted. In addition patient was found to be hyponatremic at 118-central line placed and hypertonic saline infusion initiated. Pending repeat CT head. Patient admitted to [facility] for close monitoring and higher level of care.       History reviewed. No pertinent past medical history.  History reviewed. No pertinent surgical history.   No current facility-administered medications on file prior to encounter.     Current Outpatient Medications on File Prior to Encounter   Medication Sig Dispense Refill    amLODIPine (NORVASC) 10 MG tablet Take 10 mg by mouth once daily.      atorvastatin (LIPITOR) 40 MG tablet Take 40 mg by mouth once daily.      baclofen (LIORESAL) 10 MG tablet Take 10 mg by mouth 3 (three) times daily.      ELIQUIS 5 mg Tab Take 5 mg by mouth 2 (two) times daily.      furosemide (LASIX) 40 MG tablet Take 40 mg by mouth once daily.      gabapentin (NEURONTIN) 300 MG capsule Take 300 mg by mouth once daily.      hydrOXYchloroQUINE (PLAQUENIL) 200 mg tablet Take 200 mg by mouth 2 (two) times daily.      montelukast (SINGULAIR) 10 mg tablet Take 10 mg by mouth once daily.      mycophenolate (CELLCEPT) 500 mg Tab Take 500 mg by mouth 2 (two) times daily.      NURTEC 75 mg odt Take by mouth.       predniSONE (DELTASONE) 5 MG tablet Take 5 mg by mouth once daily.      sumatriptan (IMITREX) 50 MG tablet Take by mouth.        Allergies: Patient has no known allergies.    No family history on file.     Review of Systems  Objective:     Vitals:    Temp: 98.7 °F (37.1 °C)  Pulse: 97  Rhythm: normal sinus rhythm  BP: 123/62  MAP (mmHg): 86  Resp: (!) 26  SpO2: 100 %  O2 Device (Oxygen Therapy): nasal cannula    Temp  Min: 98.5 °F (36.9 °C)  Max: 98.7 °F (37.1 °C)  Pulse  Min: 97  Max: 113  BP  Min: 113/78  Max: 185/114  MAP (mmHg)  Min: 86  Max: 107  Resp  Min: 13  Max: 26  SpO2  Min: 94 %  Max: 100 %    No intake/output data recorded.           Physical Exam  GA:  comfortable, no acute distress.   HEENT: No scleral icterus or JVD.   Pulmonary: Clear to auscultation A/P/L. No wheezing, crackles, or rhonchi.  Cardiac: RRR S1 & S2 w/o rubs/murmurs/gallops.   Abdominal: Bowel sounds present x 4. No appreciable hepatosplenomegaly.  Skin: No jaundice, rashes, or visible lesions.  Neuro:  --GCS: E2 V1 M6  --Mental Status:  Doesn't open eyes,  Doesn't follow commands, grimace to pain, global aphasic  --CN II-XII grossly intact.   --Pupils 3mm, PERRL.   --Corneal reflex, gag, cough intact.  --GUEVARA spont    Today I personally reviewed pertinent medications, lines/drains/airways, imaging, cardiology results, laboratory results,     Assessment/Plan:     Neuro  * Acute subdural hematoma  85 y/o F with PMHx of TIA and A-fib on Eliquis, s/p fall w/ head trauma on Thursday, last dose of  Eliquis on 12/11    -- CTA  head revealed- Thin extra-axial hyperdense collection overlies the right cerebral convexity compatible with acute/recent subdural hemorrhage  -- NSGY consulted  -- PCC a given   -- pending repeat CT head  -- Keppra 500 mg BID   -- SBP <140  -- PT/OT/SLP    Renal/  Hyponatremia  NA on presentation 120  -repeat 118  - 3% gtt initiated, 100 ml bolus once  - Na q 6hr      Oncology  Leukocytosis  WBC 15.96  Afebrile  blooc  cx sent      Orthopedic  RA (rheumatoid arthritis)  -- on cellcept and plaquenil           The patient is being Prophylaxed for:  Venous Thromboembolism with: Mechanical  Stress Ulcer with: None  Ventilator Pneumonia with: not applicable    Activity Orders          Turn patient every 2 hours starting at 12/13 1800    Diet NPO: NPO starting at 12/13 1039        Full Code    Apple Madrid NP  Neurocritical Care  [facility]    Critical condition in that Patient has a condition that poses threat to life and bodily function: right SDH and hyponatremia     35 minutes of Critical care time was spent personally by me on the following activities: development of treatment plan with patient or surrogate and bedside caregivers, discussions with consultants, evaluation of patient's response to treatment, examination of patient, ordering and performing treatments and interventions, ordering and review of laboratory studies, ordering and review of radiographic studies, pulse oximetry, antibiotic titration if applicable, vasopressor titration if applicable, re-evaluation of patient's condition. This critical care time did not overlap with that of any other provider or involve time for any procedures. There is high probability for acute neurological change leading to clinical and possibly life-threatening deterioration requiring highest level of physician preparedness for urgent intervention.

## 2021-12-13 NOTE — SUBJECTIVE & OBJECTIVE
History reviewed. No pertinent past medical history.  History reviewed. No pertinent surgical history.   No current facility-administered medications on file prior to encounter.     Current Outpatient Medications on File Prior to Encounter   Medication Sig Dispense Refill    amLODIPine (NORVASC) 10 MG tablet Take 10 mg by mouth once daily.      atorvastatin (LIPITOR) 40 MG tablet Take 40 mg by mouth once daily.      baclofen (LIORESAL) 10 MG tablet Take 10 mg by mouth 3 (three) times daily.      ELIQUIS 5 mg Tab Take 5 mg by mouth 2 (two) times daily.      furosemide (LASIX) 40 MG tablet Take 40 mg by mouth once daily.      gabapentin (NEURONTIN) 300 MG capsule Take 300 mg by mouth once daily.      hydrOXYchloroQUINE (PLAQUENIL) 200 mg tablet Take 200 mg by mouth 2 (two) times daily.      montelukast (SINGULAIR) 10 mg tablet Take 10 mg by mouth once daily.      mycophenolate (CELLCEPT) 500 mg Tab Take 500 mg by mouth 2 (two) times daily.      NURTEC 75 mg odt Take by mouth.      predniSONE (DELTASONE) 5 MG tablet Take 5 mg by mouth once daily.      sumatriptan (IMITREX) 50 MG tablet Take by mouth.        Allergies: Patient has no known allergies.    No family history on file.     Review of Systems  Objective:     Vitals:    Temp: 98.7 °F (37.1 °C)  Pulse: 97  Rhythm: normal sinus rhythm  BP: 123/62  MAP (mmHg): 86  Resp: (!) 26  SpO2: 100 %  O2 Device (Oxygen Therapy): nasal cannula    Temp  Min: 98.5 °F (36.9 °C)  Max: 98.7 °F (37.1 °C)  Pulse  Min: 97  Max: 113  BP  Min: 113/78  Max: 185/114  MAP (mmHg)  Min: 86  Max: 107  Resp  Min: 13  Max: 26  SpO2  Min: 94 %  Max: 100 %    No intake/output data recorded.           Physical Exam  GA:  comfortable, no acute distress.   HEENT: No scleral icterus or JVD.   Pulmonary: Clear to auscultation A/P/L. No wheezing, crackles, or rhonchi.  Cardiac: RRR S1 & S2 w/o rubs/murmurs/gallops.   Abdominal: Bowel sounds present x 4. No appreciable  hepatosplenomegaly.  Skin: No jaundice, rashes, or visible lesions.  Neuro:  --GCS: E2 V1 M6  --Mental Status:  Doesn't open eyes,  Doesn't follow commands, grimace to pain, global aphasic  --CN II-XII grossly intact.   --Pupils 3mm, PERRL.   --Corneal reflex, gag, cough intact.  --GUEVARA spont    Today I personally reviewed pertinent medications, lines/drains/airways, imaging, cardiology results, laboratory results,

## 2021-12-13 NOTE — HPI
The patient is an 86-year-old female with past medical history of TIA, RA and Afib of Eliquis admitted to [facility] with Right SDH.  She was found on the floor this morning only responding to painful stimuli.  Patient occasionally says her name.GCS 12 noted for EMS.  PCC administered. She had a mechanical fall out of bed on Thursday hitting her head and has been complaining of intermittent headaches since. Her last dose of eliquis was on 12/11. Pending repeat CT head. NSGY consulted. In addition patient was found to be hyponatremic at 118-central line placed and hypertonic saline infusion initiated. Pending repeat CT head. Patient admitted to [facility] for close monitoring and higher level of care.

## 2021-12-13 NOTE — HPI
86-year-old female with past medical history of TIA comes to the emergency department for AMS.  She was found on the floor about 1 hour ago only responding to painful stimuli.  Patient occasionally says her name.GCS 12 noted for EMS.  Patient does take blood thinners. She had a mechanical fall out of bed on Thursday hitting her head and has been complaining of intermittent headaches since. Her last dose of eliquis was on 12/11. NSGY consulted for CTH with SDH

## 2021-12-13 NOTE — NURSING
Patient arrived to [facility] from ED >> [id number]    Type of stroke/diagnosis:  SDH    TPA start and end time (if applicable) n/a    Thrombectomy start and end time (if applicable) n/a    Current symptoms: Lethargic, snoring respirations, spontaneous x4    Skin assessment done: Yes  Wounds noted:none      Smith Completed? Yes-failed    Patient Belongings on Admit:Black pants, black socks    NCC notified: KAMLA Chiu

## 2021-12-13 NOTE — ED NOTES
LOC: The patient is disoriented x 4. Pt has global aphasia. Pt only responding to painful stimuli.     APPEARANCE: Patient is clean and well groomed. Pt is asleep, snoring but maintaining a patent airway.    SKIN: The skin is warm and dry; color consistent with ethnicity.  Patient has normal skin turgor and moist mucus membranes.  Skin intact; no breakdown or bruising noted.     MUSCULOSKELETAL: Patient moving extremities spontaneously. Withdraws to pain.      RESPIRATORY: Airway is open and patent. Respirations spontaneous, even, easy, and non-labored.  Patient has a normal effort and rate.  No accessory muscle use noted.     CARDIAC:  Normal rhythm and rate noted.  No peripheral edema noted.     ABDOMEN: Soft and non tender to palpation.  No distention noted.     NEUROLOGIC: Eyes open to painful stimuli. Pt does not follow commands; facial expression asymmetrical to L side.  Purposeful motor response noted.

## 2021-12-13 NOTE — HOSPITAL COURSE
12/13/2021: patient admitted to [facility] s/p fall on 12/11 on Eliquis  12/14/2021 wheezing, prolonged expiratory phase.   12/15/2021 copious respiratory secretions. Remains encephalopathic. Review EEG  12/16/2021 improved respiratory secretions. D/c nasal tracheal airway if minimal secretions.   12/17/2021 restless.   12/18/2021 lethargic, rising BUN/Cr ratio. Fluid resuscitation. Received flumazenil for BDZ   12/19/2021 encephalopathy overnight. Work up neg so far. BUN/Cr >20 from volume contraction.   12/20/2021 left facial twitching overnight. Loaded with vimpat.   12/21/2021 event overnight cardiac arrest/PEA likely related to respiratory distress. Intubated.   12/22/2021 Add precedex to enable vent weaning.  12/23/2021   extubation trial today  12/24/2021 extubated over cook catheter due to concern for laryngeal edema. Successfully extubated this am. Close monitoring.   12/25/2021 agitated delirium overnight.   12/26/2021 increase cough assist frequency.   12/27: pulmonary toilette, prednisone, daughter updated at bedside, change diet to puree  12/28: resume home apixaban, rescan head tonight, continue pulmonary toilette, can probably step down in AM, melatonin at 22 hr  12/29/2021 CT head stable./ Pureed diet started. Stepping down to medicine.   12/30/2021 trouble with sleeping overnight, will utilize Seroquel tonight. Avoid Nebs at night.    12/31/2021 Stepped down to HM.

## 2021-12-13 NOTE — ASSESSMENT & PLAN NOTE
86F with on eliquis s/p head trauma 1 week prior now with small aSDH and AMS:       --aSDH is unlikely to be the cause of her change in mental status  --Recommend admission to [facility] for correction of hyponatremia (Na 120 on admission)   --Repeat CT in 6 hr   --CTH: R temp/paretial aSDH 5-6mm thickness, 3-5mm MLS  --SBP<150  --HOB > 30  --Please page NSGY immediately for any changes in neuro status

## 2021-12-13 NOTE — SUBJECTIVE & OBJECTIVE
Medications Prior to Admission   Medication Sig Dispense Refill Last Dose    amLODIPine (NORVASC) 10 MG tablet Take 10 mg by mouth once daily.       atorvastatin (LIPITOR) 40 MG tablet Take 40 mg by mouth once daily.       baclofen (LIORESAL) 10 MG tablet Take 10 mg by mouth 3 (three) times daily.       ELIQUIS 5 mg Tab Take 5 mg by mouth 2 (two) times daily.       furosemide (LASIX) 40 MG tablet Take 40 mg by mouth once daily.       gabapentin (NEURONTIN) 300 MG capsule Take 300 mg by mouth once daily.       hydrOXYchloroQUINE (PLAQUENIL) 200 mg tablet Take 200 mg by mouth 2 (two) times daily.       montelukast (SINGULAIR) 10 mg tablet Take 10 mg by mouth once daily.       mycophenolate (CELLCEPT) 500 mg Tab Take 500 mg by mouth 2 (two) times daily.       NURTEC 75 mg odt Take by mouth.       predniSONE (DELTASONE) 5 MG tablet Take 5 mg by mouth once daily.       sumatriptan (IMITREX) 50 MG tablet Take by mouth.          Review of patient's allergies indicates:  No Known Allergies    History reviewed. No pertinent past medical history.  History reviewed. No pertinent surgical history.  Family History    None       Tobacco Use    Smoking status: Not on file    Smokeless tobacco: Not on file   Substance and Sexual Activity    Alcohol use: Not on file    Drug use: Not on file    Sexual activity: Not on file     Review of Systems  Objective:     Weight: 67 kg (147 lb 11.3 oz)  There is no height or weight on file to calculate BMI.  Vital Signs (Most Recent):  Temp: 98.5 °F (36.9 °C) (12/13/21 1100)  Pulse: 107 (12/13/21 1420)  Resp: 16 (12/13/21 1420)  BP: 119/88 (12/13/21 1420)  SpO2: 100 % (12/13/21 1420) Vital Signs (24h Range):  Temp:  [98.5 °F (36.9 °C)] 98.5 °F (36.9 °C)  Pulse:  [] 107  Resp:  [13-20] 16  SpO2:  [94 %-100 %] 100 %  BP: (113-185)/() 119/88     Date 12/13/21 0700 - 12/14/21 0659   Shift 6979-7209 6754-8898 9807-7290 24 Hour Total   INTAKE   I.V.(mL/kg) 36.2(0.5)    36.2(0.5)   Shift Total(mL/kg) 36.2(0.5)   36.2(0.5)   OUTPUT   Shift Total(mL/kg)       Weight (kg) 67 67 67 67                   Female External Urinary Catheter 12/13/21 1424 (Active)       Physical Exam:    Neurological:   E1V2M5, somnolent, lethargic  PERRL  Purposeful movements x4         Significant Labs:  Recent Labs   Lab 12/13/21  1055 12/13/21  1229   GLU 90  --    * 118*   K 4.3  --    CL 84*  --    CO2 23  --    BUN 13  --    CREATININE 0.8  --    CALCIUM 9.2  --      Recent Labs   Lab 12/13/21  1049 12/13/21  1055   WBC  --  15.56*   HGB  --  12.0   HCT 39 37.0   PLT  --  140*     Recent Labs   Lab 12/13/21  1121   INR 1.0   APTT 28.6     Microbiology Results (last 7 days)     ** No results found for the last 168 hours. **        All pertinent labs from the last 24 hours have been reviewed.    Significant Diagnostics:  I have reviewed all pertinent imaging results/findings within the past 24 hours.

## 2021-12-13 NOTE — ED NOTES
Pt had a fall on Thursday night in which she went to [facility] ED and had a CT scan.

## 2021-12-13 NOTE — ASSESSMENT & PLAN NOTE
85 y/o F with PMHx of TIA and A-fib on Eliquis, s/p fall w/ head trauma on Thursday, last dose of  Eliquis on 12/11    -- CTA  head revealed- Thin extra-axial hyperdense collection overlies the right cerebral convexity compatible with acute/recent subdural hemorrhage  -- NSGY consulted  -- PCC a given   -- pending repeat CT head  -- Keppra 500 mg BID   -- SBP <140  -- PT/OT/SLP

## 2021-12-13 NOTE — PROGRESS NOTES
Manometry for Central Line Procedure     Manometry Performed: yes    Manometry performed by: Dr. Egan

## 2021-12-13 NOTE — CONSULTS
[facility]  Neurosurgery  Consult Note    Inpatient consult to Neurosurgery  Consult performed by: Frank Gonzalez MD  Consult ordered by: Pamela Wahl MD        Subjective:     Chief Complaint/Reason for Admission: AMS w/ aSDH & Hyponatremia     History of Present Illness: 86-year-old female with past medical history of TIA comes to the emergency department for AMS.  She was found on the floor about 1 hour ago only responding to painful stimuli.  Patient occasionally says her name.GCS 12 noted for EMS.  Patient does take blood thinners. She had a mechanical fall out of bed on Thursday hitting her head and has been complaining of intermittent headaches since. Her last dose of eliquis was on 12/11. NSGY consulted for CTH with SDH      Medications Prior to Admission   Medication Sig Dispense Refill Last Dose    amLODIPine (NORVASC) 10 MG tablet Take 10 mg by mouth once daily.       atorvastatin (LIPITOR) 40 MG tablet Take 40 mg by mouth once daily.       baclofen (LIORESAL) 10 MG tablet Take 10 mg by mouth 3 (three) times daily.       ELIQUIS 5 mg Tab Take 5 mg by mouth 2 (two) times daily.       furosemide (LASIX) 40 MG tablet Take 40 mg by mouth once daily.       gabapentin (NEURONTIN) 300 MG capsule Take 300 mg by mouth once daily.       hydrOXYchloroQUINE (PLAQUENIL) 200 mg tablet Take 200 mg by mouth 2 (two) times daily.       montelukast (SINGULAIR) 10 mg tablet Take 10 mg by mouth once daily.       mycophenolate (CELLCEPT) 500 mg Tab Take 500 mg by mouth 2 (two) times daily.       NURTEC 75 mg odt Take by mouth.       predniSONE (DELTASONE) 5 MG tablet Take 5 mg by mouth once daily.       sumatriptan (IMITREX) 50 MG tablet Take by mouth.          Review of patient's allergies indicates:  No Known Allergies    History reviewed. No pertinent past medical history.  History reviewed. No pertinent surgical history.  Family History    None       Tobacco Use    Smoking status: Not on  file    Smokeless tobacco: Not on file   Substance and Sexual Activity    Alcohol use: Not on file    Drug use: Not on file    Sexual activity: Not on file     Review of Systems  Objective:     Weight: 67 kg (147 lb 11.3 oz)  There is no height or weight on file to calculate BMI.  Vital Signs (Most Recent):  Temp: 98.5 °F (36.9 °C) (12/13/21 1100)  Pulse: 107 (12/13/21 1420)  Resp: 16 (12/13/21 1420)  BP: 119/88 (12/13/21 1420)  SpO2: 100 % (12/13/21 1420) Vital Signs (24h Range):  Temp:  [98.5 °F (36.9 °C)] 98.5 °F (36.9 °C)  Pulse:  [] 107  Resp:  [13-20] 16  SpO2:  [94 %-100 %] 100 %  BP: (113-185)/() 119/88     Date 12/13/21 0700 - 12/14/21 0659   Shift 4134-6597 1684-5082 [id number] 24 Hour Total   INTAKE   I.V.(mL/kg) 36.2(0.5)   36.2(0.5)   Shift Total(mL/kg) 36.2(0.5)   36.2(0.5)   OUTPUT   Shift Total(mL/kg)       Weight (kg) 67 67 67 67                   Female External Urinary Catheter 12/13/21 1424 (Active)       Physical Exam:    Neurological:   E1V2M5, somnolent, lethargic  PERRL  Purposeful movements x4         Significant Labs:  Recent Labs   Lab 12/13/21  1055 12/13/21  1229   GLU 90  --    * 118*   K 4.3  --    CL 84*  --    CO2 23  --    BUN 13  --    CREATININE 0.8  --    CALCIUM 9.2  --      Recent Labs   Lab 12/13/21  1049 12/13/21  1055   WBC  --  15.56*   HGB  --  12.0   HCT 39 37.0   PLT  --  140*     Recent Labs   Lab 12/13/21  1121   INR 1.0   APTT 28.6     Microbiology Results (last 7 days)     ** No results found for the last 168 hours. **        All pertinent labs from the last 24 hours have been reviewed.    Significant Diagnostics:  I have reviewed all pertinent imaging results/findings within the past 24 hours.    Assessment/Plan:     Acute subdural hematoma  86F with on eliquis s/p head trauma 1 week prior now with small aSDH and AMS:       --aSDH is unlikely to be the cause of her change in mental status  --Recommend admission to [facility] for correction of  hyponatremia (Na 120 on admission)   --Repeat CT in 6 hr   --CTH: R temp/paretial aSDH 5-6mm thickness, 3-5mm MLS  --SBP<150  --HOB > 30  --Please page NSGY immediately for any changes in neuro status        Thank you for your consult. I will follow-up with patient. Please contact us if you have any additional questions.    Frank Gonzalez MD  Neurosurgery  [facility]

## 2021-12-13 NOTE — ED PROVIDER NOTES
Encounter Date: 12/13/2021       History     Chief Complaint   Patient presents with    Altered Mental Status     LNK last night. Found on floor altered an hour PTA only responding to painful stimuli. Normally responsive and Aox4 per EMS. WC bound. Left sided facial droop noted. Daughter in route. Hx of TIA. On blood thinners.     HPI     86-year-old female with past medical history of TIA comes to the emergency department for probable stroke code.  Last normal noted last night.  Patient was found on floor approximately 1 hour ago and only responds to painful stimuli.  Patient occasionally says her name.  GCS 12 noted for EMS.  Patient does take blood thinners .      Additional history was obtained from patient's daughter who is bedside.  She says that her mom suffers from chronic migraines and sees a neurologist.  Over the last week mom has been complaining of intermittent headaches as well as being treated for UTI.  She is a little bit more altered from baseline during this time.  Last night patient noted to be agitated however returning to baseline.  Note, patient also had a mechanical fall out of bed on Thursday hitting her head.  She presented to outside emergency department and had a negative CT head at that time.  Last received Eliquis on Saturday secondary to decreased p.o..    Review of patient's allergies indicates:  No Known Allergies  History reviewed. No pertinent past medical history.  History reviewed. No pertinent surgical history.  No family history on file.     Review of Systems   Unable to perform ROS: Mental status change       Physical Exam     Initial Vitals   BP Pulse Resp Temp SpO2   12/13/21 1040 12/13/21 1040 12/13/21 1040 -- 12/13/21 1042   (!) 185/114 104 20  (!) 94 %      MAP       --                Physical Exam    Nursing note and vitals reviewed.  Constitutional: She appears well-developed and well-nourished.   Patient has sonorous and responsive to painful stimuli.   HENT:   Head:  Normocephalic and atraumatic.   Nose: Nose normal.   Eyes: Conjunctivae and EOM are normal. Pupils are equal, round, and reactive to light.   Pupils 2 mm equal and reactive   Neck: No tracheal deviation present.   Normal range of motion.  Cardiovascular: Normal rate and normal heart sounds. Exam reveals no friction rub.    No murmur heard.  Pulmonary/Chest: Breath sounds normal. No respiratory distress.   Rhonchorous breath sounds noted bilaterally.  Sonorous however protecting airway at this time.   Abdominal: Abdomen is soft. She exhibits no distension.   Musculoskeletal:         General: No edema. Normal range of motion.      Cervical back: Normal range of motion.      Comments: Patient moving all extremities spontaneously.     Lymphadenopathy:     She has no cervical adenopathy.   Neurological: She is alert and oriented to person, place, and time. GCS eye subscore is 4. GCS verbal subscore is 5. GCS motor subscore is 6.   Patient noted to move all extremities spontaneously.  Not participating with neuro exam.  Appears altered.  Questionable left-sided facial droop noted.  Responsive to painful stimuli.  Later in ED course opens eyes to speech.  Incomprehensible speech. GCS 10   Skin: Skin is warm. Capillary refill takes less than 2 seconds.         ED Course   Procedures  Labs Reviewed   CBC W/ AUTO DIFFERENTIAL - Abnormal; Notable for the following components:       Result Value    WBC 15.56 (*)     RDW 15.7 (*)     Platelets 140 (*)     Immature Granulocytes 1.8 (*)     Gran # (ANC) 9.8 (*)     Immature Grans (Abs) 0.28 (*)     Mono # 3.4 (*)     Lymph % 12.5 (*)     Mono % 21.8 (*)     All other components within normal limits   ISTAT PROCEDURE - Abnormal; Notable for the following components:    POC Sodium 120 (*)     POC Chloride 83 (*)     All other components within normal limits   ISTAT PROCEDURE - Abnormal; Notable for the following components:    POC PTWBT 15.8 (*)     POC PTINR 1.3 (*)     All other  components within normal limits   COMPREHENSIVE METABOLIC PANEL   TSH   LIPID PANEL   CK   URINALYSIS, REFLEX TO URINE CULTURE   APTT   PROTIME-INR   SODIUM   POCT GLUCOSE, HAND-HELD DEVICE   SARS-COV-2 RDRP GENE   ISTAT CREATININE          Imaging Results          MRI Brain Without Contrast (In process)                X-Ray Chest AP Portable (Final result)  Result time 12/13/21 11:44:31    Final result by Henok Barnes MD (12/13/21 11:44:31)                 Impression:      1. Cardiomegaly.  2. Widening of the mediastinum with findings highly suggestive of a right paratracheal mass with deviation of the trachea to the left as above.  Further evaluation of the mediastinum with a dedicated CT scan of the chest with contrast is suggested.      Electronically signed by: Henok Barnes MD  Date:    12/13/2021  Time:    11:44             Narrative:    EXAMINATION:  XR CHEST AP PORTABLE    CLINICAL HISTORY:  altered mental status;    TECHNIQUE:  Single frontal view of the chest was performed.    COMPARISON:  None    FINDINGS:  There are no prior chest radiographs for comparison at this time.    There is borderline cardiomegaly.  Widening of the mediastinum is noted.  There is prominence of the right paratracheal soft tissues with mild lobulation to the lateral wall of the mediastinum on the right.  A mediastinal mass suspected.  There is tracheal deviation to the left.  To evaluate the mediastinum a dedicated CT scan of the chest with contrast is suggested.    Nonspecific elevation of the right hemidiaphragm.  No gross acute lobar consolidations noted.  No pleural effusions.  There is no pneumothorax.    There are cardiac monitoring leads over the chest.  There is moderate to severe osteoarthritic changes of the shoulder joints bilaterally.                                CTA STROKE MULTI-PHASE (Final result)  Result time 12/13/21 11:30:26    Final result by Brock Barajas DO (12/13/21 11:30:26)                 Impression:       CTA head: Allowing for limitations with poor arterial contrast opacification intracranially there is no definite proximal high-grade stenosis or proximal occlusion.    CTA neck: Atherosclerotic disease carotid bifurcations and proximal ICAs with concern for 50-60% right proximal ICA stenosis, although distorted by artifacts.  Clinical correlation further evaluation with carotid ultrasonography may be of further value    Abrupt termination of flow within the right distal subclavian vein at the thoracic inlet concerning for venous stenosis or probable chronic occlusion with collateral flow throughout neck and filling of the SVC likely via collateral flow from the left IJ.    Please note stenosis or occlusion in the right proximal subclavian vein is suspected be somewhat in association to the large right thyroid lesion with substernal extension.  Clinical correlation advised.    Please note there in addition there is mass effect on the trachea airway which is significantly narrowed as detailed above    CT head: Thin extra-axial hyperdense collection overlies the right cerebral convexity compatible with acute/recent subdural hemorrhage.    No definite parenchymal hemorrhage allowing for artifacts related to motion    Mass effect with 3-4 mm leftward midline shift without hydrocephalus    Few patchy areas of decreased attenuation supratentorial white matter which are nonspecific and may represent chronic ischemic change    Small region of hypoattenuation in the right cerebellum concerning for prior infarction though age indeterminate.    Clinical correlation and further evaluation with MRI as warranted if patient compatible.    Please see above for additional details.      Electronically signed by: Brock Barajas DO  Date:    12/13/2021  Time:    11:30             Narrative:    EXAMINATION:  CTA STROKE MULTI-PHASE    CLINICAL HISTORY:  Neuro deficit, acute, stroke suspected;    TECHNIQUE:  5 mm axial images of the  head pre and post contrast with 0.625 mm axial CTA images of the head neck post-contrast.  Coronal and sagittal MPR and MIP imaging was performed 100 ml of Omnipaque 350 contrast was injected intravenously.  Please note study was performed in multiphase technique with 3 arterial passes through the head.    COMPARISON:  None    FINDINGS:  CT head  without contrast: There is thin extra-axial hyperdense collection overlying the right cerebral convexity compatible with acute/recent subdural hemorrhage.  There is significant distortion of the examination secondary to motion.  Allowing for limitation no definite parenchymal hemorrhage.  Mass effect with partial compression right lateral ventricle and approximate 3-4 mm of leftward midline shift.  Ventricular configuration otherwise within normal limits without evidence for hydrocephalus.  There is patchy ill-defined decreased attenuation supratentorial white matter which is nonspecific and suggestive for chronic ischemic change.  There is small tubular region of hypodensity in the right cerebellum suggestive for encephalomalacia and prior infarction though age indeterminate.    CTA head: Evaluation distorted by motion artifact and limited by poor arterial contrast opacification despite triple phase technique.  In addition there is artifacts distortion and study secondary to high density contrast throughout the venous vasculature with innumerable collaterals.    Anterior circulation: Allowing for limitations is the bilateral distal cervical petrous cavernous and supraclinoid segments of the ICAs are patent.  There is heavy atherosclerotic plaquing in the cavernous segments of the ICAs without significant focal stenosis.  The bilateral anterior and middle cerebral arteries are grossly patent without focal high-grade stenosis    Posterior circulation: The distal left vertebral artery is diminutive and may functionally ending in PICA.  The distal right vertebral artery,  basilar artery and posterior cerebral arteries are patent.  There is prominent bilateral posterior communicating arteries.    CTA neck: Atherosclerotic plaquing of the arch without significant focal stenosis origin the great vessels allowing for poor arterial contrast opacification.  There is distortion of the proximal common carotid arteries displaced by large thyroid lesion with substernal extension.  There is atherosclerotic plaquing carotid bifurcations and proximal ICAs.  Allowing for artifacts and poor teary ule contrast opacification there is less than 50% left proximal ICA stenosis by NASCET criteria    There is irregularity and narrowing of the right carotid bifurcation and proximal ICA with questioned stenosis in the right proximal ICA although significantly distorted by high density the contrast within the adjacent venous vasculature causing beam hardening artifacts.  Overall concerning for 50-60% right proximal ICA stenosis by NASCET criteria within the limits of the study.    Origin of the right vertebral artery from the right subclavian arteries within normal limits.  The origin of the left vertebral artery from the left subclavian artery is not well seen.  The right vertebral artery is partially distorted by high-density contrast within the adjacent perivertebral venous plexus although grossly patent throughout its course.  The left vertebral artery originates from the proximal subclavian artery and is diminutive throughout its course allowing for small caliber no significant focal stenosis with distal left vertebral artery severely diminutive and likely functionally ends in PICA.    Please note there is prominent high density contrast within the venous vasculature within the right subclavian vein extending to the right internal jugular vein with abrupt termination flow within the right subclavian vein at the thoracic inlet concerning for venous stenosis and probable occlusion.  There is extensive  venous collateral opacification throughout the neck and right chest with dense venous contrast opacification in the left jugular vein and extending to the SVC.    Pharynx/larynx: Evaluation distorted by scatter artifact from dental metal and and high-density venous contrast.  Allowing for artifacts the nasopharynx is within normal limits.  There is medialized carotids no retropharyngeal soft tissues as well as prominent venous opacification with impression posterior pharyngeal wall slight narrowed caliber airway.    Oral cavity and the buccal space     distorted by dental metal.    Glands: Bilateral parotid and submandibular glands are within normal limits. There is heterogeneous enlargement of the thyroid gland bilaterally with dominant heterogeneous predominantly hypodense nodule in the right lobe inferiorly with substernal extension this measures at least 7 cm in greatest dimension.  Mass effect and distortion of the trachea which is small in caliber measuring 0.5 cm transverse and 0.8 cm AP image 57 series 5.  Clinical correlation advised.    No evidence for adenopathy throughout the neck by size criteria.    Few linear opacities in the lungs which may represent atelectasis or scarring.  There is patchy mosaic ground-glass attenuation of the lungs which is indeterminate small airway disease not excluded.    Trace grade 1 anterolisthesis of C2 on C3 and grade 1 retrolisthesis of C3 on C4.  There is degenerative change with intervertebral disc height loss and endplate degeneration at all levels.  No evidence for acute fracture cervical spine.    Partially visualized advanced degenerative change bilateral shoulder joints.    Case discussed with Dr. Moon on 12/13/2021 at 11:07.  This report was flagged in Epic as abnormal.                                 Medications   levETIRAcetam injection 1,000 mg (has no administration in time range)   niCARdipine 40 mg/200 mL (0.2 mg/mL) infusion (has no administration in  time range)   iohexoL (OMNIPAQUE 350) injection 100 mL (100 mLs Intravenous Given 12/13/21 1048)     Medical Decision Making:   History:   I obtained history from: someone other than patient.  Old Medical Records: I decided to obtain old medical records.  Old Records Summarized: records from clinic visits and records from previous admission(s).  Independently Interpreted Test(s):   I have ordered and independently interpreted X-rays - see prior notes.  I have ordered and independently interpreted EKG Reading(s) - see prior notes  Clinical Tests:   Lab Tests: Ordered  The following lab test(s) were unremarkable: CBC  Radiological Study: Ordered and Reviewed  Medical Tests: Ordered          86-year-old female with a past medical history of TIA is coming in for stroke activation.  Takes Eliquis.   -Differential diagnosis includes CVA, intracranial hemorrhage, UTI, sepsis, altered mental status.  Patient currently protecting airway at this time.  CBC, CMP, CK, point of care glucose, COVID-19, UA, TSH, coags, EKG, chest x-ray ordered and pending.  Patient started on 1 g Keppra.  Cardene infusion ordered for blood pressure control with goal of systolic less than 140.  Sats noted to be 91-93% and placed on 2 L nasal cannula with improvement.  Emergent CT head completed notable for subdural hematoma on right.  Neurosurgery consulted.  I-STAT sodium noted to be 120. Neuro critical care consult for admission.    ___________________  Lashaun Rosado  Emergency Medicine Resident PGY2  11:50 AM  12/13/2021        ED Course as of 12/13/21 1154   Mon Dec 13, 2021   1059 Stroke code cancelled as patient has a right-sided subdural hematoma.  Neurosurgery consulted [GM]      ED Course User Index  [GM] Pamela Wahl MD             Clinical Impression:   Final diagnoses:  [I63.9] Stroke          ED Disposition Condition    Admit               Lashaun Rosado MD  Resident  12/13/21 1154

## 2021-12-13 NOTE — CARE UPDATE
I have personally spoken with the ED staff about Selma Lux, a new neurosurgical consult who presents to the hospital today for AMS w/ aSDH I have reviewed all pertinent imaging and diagnostics.      --HOB > 30   --SBP<150  --Keppra   --Repeat CTH in 6 hrs  --Admit to [facility] for hyponatremia correction     I have discussed a tentative plan with the ED team appropriate for this patient's medical care and acuity level.        A full consult note will follow.

## 2021-12-13 NOTE — PROCEDURES
"Selma Lux is a 86 y.o. female patient.    Temp: 98.7 °F (37.1 °C) (21 1415)  Pulse: 97 (21 1600)  Resp: (!) 26 (21 1600)  BP: 123/62 (21 1600)  SpO2: 100 % (21 1600)  Weight: 67 kg (147 lb 11.3 oz) (21 1240)       Arterial Line    Date/Time: 2021 4:35 PM  Location procedure was performed: [facility] NEURO CRITICAL CARE  Performed by: Osvaldo Husain MD  Authorized by: Osvaldo Husain MD   Consent Done: Yes  Consent: Verbal consent obtained. Written consent obtained.  Risks and benefits: risks, benefits and alternatives were discussed  Consent given by: daughter.  Patient identity confirmed: , MRN and name  Time out: Immediately prior to procedure a "time out" was called to verify the correct patient, procedure, equipment, support staff and site/side marked as required.  Preparation: Patient was prepped and draped in the usual sterile fashion.  Indications: hemodynamic monitoring  Location: right radial    Anesthesia:  Local Anesthetic: lidocaine 1% without epinephrine  Alek's test normal: yes  Needle gauge: 20  Number of attempts: 1  Complications: No  Post-procedure: dressing applied  Patient tolerance: Patient tolerated the procedure well with no immediate complications        Osvaldo Husain MD  2021  "

## 2021-12-14 LAB
ALBUMIN SERPL BCP-MCNC: 3.1 G/DL (ref 3.5–5.2)
ALLENS TEST: ABNORMAL
ALP SERPL-CCNC: 47 U/L (ref 55–135)
ALT SERPL W/O P-5'-P-CCNC: 13 U/L (ref 10–44)
ANION GAP SERPL CALC-SCNC: 13 MMOL/L (ref 8–16)
ANION GAP SERPL CALC-SCNC: 6 MMOL/L (ref 8–16)
AST SERPL-CCNC: 18 U/L (ref 10–40)
BACTERIA #/AREA URNS AUTO: NORMAL /HPF
BASOPHILS # BLD AUTO: 0.02 K/UL (ref 0–0.2)
BASOPHILS NFR BLD: 0.2 % (ref 0–1.9)
BILIRUB SERPL-MCNC: 1.7 MG/DL (ref 0.1–1)
BILIRUB UR QL STRIP: NEGATIVE
BUN SERPL-MCNC: 14 MG/DL (ref 8–23)
BUN SERPL-MCNC: 15 MG/DL (ref 8–23)
CALCIUM SERPL-MCNC: 7.8 MG/DL (ref 8.7–10.5)
CALCIUM SERPL-MCNC: 8 MG/DL (ref 8.7–10.5)
CHLORIDE SERPL-SCNC: 98 MMOL/L (ref 95–110)
CHLORIDE SERPL-SCNC: 99 MMOL/L (ref 95–110)
CLARITY UR REFRACT.AUTO: CLEAR
CO2 SERPL-SCNC: 18 MMOL/L (ref 23–29)
CO2 SERPL-SCNC: 19 MMOL/L (ref 23–29)
COLOR UR AUTO: ABNORMAL
CREAT SERPL-MCNC: 0.6 MG/DL (ref 0.5–1.4)
CREAT SERPL-MCNC: 0.7 MG/DL (ref 0.5–1.4)
DELSYS: ABNORMAL
DIFFERENTIAL METHOD: ABNORMAL
EOSINOPHIL # BLD AUTO: 0.2 K/UL (ref 0–0.5)
EOSINOPHIL NFR BLD: 1.4 % (ref 0–8)
ERYTHROCYTE [DISTWIDTH] IN BLOOD BY AUTOMATED COUNT: 15.6 % (ref 11.5–14.5)
ERYTHROCYTE [DISTWIDTH] IN BLOOD BY AUTOMATED COUNT: 15.7 % (ref 11.5–14.5)
EST. GFR  (AFRICAN AMERICAN): >60 ML/MIN/1.73 M^2
EST. GFR  (AFRICAN AMERICAN): >60 ML/MIN/1.73 M^2
EST. GFR  (NON AFRICAN AMERICAN): >60 ML/MIN/1.73 M^2
EST. GFR  (NON AFRICAN AMERICAN): >60 ML/MIN/1.73 M^2
GLUCOSE SERPL-MCNC: 107 MG/DL (ref 70–110)
GLUCOSE SERPL-MCNC: 79 MG/DL (ref 70–110)
GLUCOSE UR QL STRIP: NEGATIVE
HCO3 UR-SCNC: 22.5 MMOL/L (ref 24–28)
HCT VFR BLD AUTO: 29.6 % (ref 37–48.5)
HCT VFR BLD AUTO: 30.4 % (ref 37–48.5)
HGB BLD-MCNC: 9.7 G/DL (ref 12–16)
HGB BLD-MCNC: 9.9 G/DL (ref 12–16)
HGB UR QL STRIP: ABNORMAL
HYALINE CASTS UR QL AUTO: 1 /LPF
IMM GRANULOCYTES # BLD AUTO: 0.16 K/UL (ref 0–0.04)
IMM GRANULOCYTES NFR BLD AUTO: 1.4 % (ref 0–0.5)
KETONES UR QL STRIP: NEGATIVE
LEUKOCYTE ESTERASE UR QL STRIP: NEGATIVE
LYMPHOCYTES # BLD AUTO: 1 K/UL (ref 1–4.8)
LYMPHOCYTES NFR BLD: 8.8 % (ref 18–48)
MAGNESIUM SERPL-MCNC: 1.8 MG/DL (ref 1.6–2.6)
MCH RBC QN AUTO: 28.9 PG (ref 27–31)
MCH RBC QN AUTO: 28.9 PG (ref 27–31)
MCHC RBC AUTO-ENTMCNC: 32.6 G/DL (ref 32–36)
MCHC RBC AUTO-ENTMCNC: 32.8 G/DL (ref 32–36)
MCV RBC AUTO: 88 FL (ref 82–98)
MCV RBC AUTO: 89 FL (ref 82–98)
MICROSCOPIC COMMENT: NORMAL
MONOCYTES # BLD AUTO: 2.2 K/UL (ref 0.3–1)
MONOCYTES NFR BLD: 19.5 % (ref 4–15)
NEUTROPHILS # BLD AUTO: 7.7 K/UL (ref 1.8–7.7)
NEUTROPHILS NFR BLD: 68.7 % (ref 38–73)
NITRITE UR QL STRIP: NEGATIVE
NRBC BLD-RTO: 0 /100 WBC
PCO2 BLDA: 34.3 MMHG (ref 35–45)
PH SMN: 7.42 [PH] (ref 7.35–7.45)
PH UR STRIP: 5 [PH] (ref 5–8)
PHOSPHATE SERPL-MCNC: 3.5 MG/DL (ref 2.7–4.5)
PLATELET # BLD AUTO: 116 K/UL (ref 150–450)
PLATELET # BLD AUTO: 129 K/UL (ref 150–450)
PMV BLD AUTO: 10.2 FL (ref 9.2–12.9)
PMV BLD AUTO: 9.8 FL (ref 9.2–12.9)
PO2 BLDA: 88 MMHG (ref 80–100)
POC BE: -2 MMOL/L
POC SATURATED O2: 97 % (ref 95–100)
POC TCO2: 24 MMOL/L (ref 23–27)
POTASSIUM SERPL-SCNC: 3.5 MMOL/L (ref 3.5–5.1)
POTASSIUM SERPL-SCNC: 3.5 MMOL/L (ref 3.5–5.1)
PROT SERPL-MCNC: 4.8 G/DL (ref 6–8.4)
PROT UR QL STRIP: NEGATIVE
RBC # BLD AUTO: 3.36 M/UL (ref 4–5.4)
RBC # BLD AUTO: 3.43 M/UL (ref 4–5.4)
RBC #/AREA URNS AUTO: 1 /HPF (ref 0–4)
SAMPLE: ABNORMAL
SITE: ABNORMAL
SODIUM SERPL-SCNC: 122 MMOL/L (ref 136–145)
SODIUM SERPL-SCNC: 122 MMOL/L (ref 136–145)
SODIUM SERPL-SCNC: 124 MMOL/L (ref 136–145)
SODIUM SERPL-SCNC: 126 MMOL/L (ref 136–145)
SODIUM SERPL-SCNC: 130 MMOL/L (ref 136–145)
SODIUM SERPL-SCNC: 131 MMOL/L (ref 136–145)
SODIUM SERPL-SCNC: 132 MMOL/L (ref 136–145)
SP GR UR STRIP: 1 (ref 1–1.03)
URN SPEC COLLECT METH UR: ABNORMAL
WBC # BLD AUTO: 11.17 K/UL (ref 3.9–12.7)
WBC # BLD AUTO: 12.35 K/UL (ref 3.9–12.7)
WBC #/AREA URNS AUTO: 0 /HPF (ref 0–5)

## 2021-12-14 PROCEDURE — 63600175 PHARM REV CODE 636 W HCPCS: Performed by: PSYCHIATRY & NEUROLOGY

## 2021-12-14 PROCEDURE — 80048 BASIC METABOLIC PNL TOTAL CA: CPT | Performed by: STUDENT IN AN ORGANIZED HEALTH CARE EDUCATION/TRAINING PROGRAM

## 2021-12-14 PROCEDURE — 94761 N-INVAS EAR/PLS OXIMETRY MLT: CPT

## 2021-12-14 PROCEDURE — 84295 ASSAY OF SERUM SODIUM: CPT | Performed by: PSYCHIATRY & NEUROLOGY

## 2021-12-14 PROCEDURE — 81001 URINALYSIS AUTO W/SCOPE: CPT | Performed by: STUDENT IN AN ORGANIZED HEALTH CARE EDUCATION/TRAINING PROGRAM

## 2021-12-14 PROCEDURE — 82803 BLOOD GASES ANY COMBINATION: CPT

## 2021-12-14 PROCEDURE — 85027 COMPLETE CBC AUTOMATED: CPT

## 2021-12-14 PROCEDURE — 37799 UNLISTED PX VASCULAR SURGERY: CPT

## 2021-12-14 PROCEDURE — 83735 ASSAY OF MAGNESIUM: CPT | Performed by: NURSE PRACTITIONER

## 2021-12-14 PROCEDURE — 84295 ASSAY OF SERUM SODIUM: CPT | Mod: 91 | Performed by: PHYSICIAN ASSISTANT

## 2021-12-14 PROCEDURE — 84100 ASSAY OF PHOSPHORUS: CPT | Performed by: NURSE PRACTITIONER

## 2021-12-14 PROCEDURE — 99233 SBSQ HOSP IP/OBS HIGH 50: CPT | Mod: GC,,, | Performed by: NEUROLOGICAL SURGERY

## 2021-12-14 PROCEDURE — 25000003 PHARM REV CODE 250: Performed by: PSYCHIATRY & NEUROLOGY

## 2021-12-14 PROCEDURE — 99900035 HC TECH TIME PER 15 MIN (STAT)

## 2021-12-14 PROCEDURE — 20000000 HC ICU ROOM

## 2021-12-14 PROCEDURE — 80053 COMPREHEN METABOLIC PANEL: CPT | Performed by: NURSE PRACTITIONER

## 2021-12-14 PROCEDURE — 63600175 PHARM REV CODE 636 W HCPCS: Performed by: PHYSICIAN ASSISTANT

## 2021-12-14 PROCEDURE — 85025 COMPLETE CBC W/AUTO DIFF WBC: CPT | Performed by: NURSE PRACTITIONER

## 2021-12-14 PROCEDURE — 99291 CRITICAL CARE FIRST HOUR: CPT | Mod: ,,, | Performed by: PSYCHIATRY & NEUROLOGY

## 2021-12-14 PROCEDURE — 25000003 PHARM REV CODE 250: Performed by: STUDENT IN AN ORGANIZED HEALTH CARE EDUCATION/TRAINING PROGRAM

## 2021-12-14 PROCEDURE — 95700 EEG CONT REC W/VID EEG TECH: CPT

## 2021-12-14 PROCEDURE — 31720 CLEARANCE OF AIRWAYS: CPT

## 2021-12-14 PROCEDURE — P9047 ALBUMIN (HUMAN), 25%, 50ML: HCPCS | Mod: JG

## 2021-12-14 PROCEDURE — 99291 PR CRITICAL CARE, E/M 30-74 MINUTES: ICD-10-PCS | Mod: ,,, | Performed by: PSYCHIATRY & NEUROLOGY

## 2021-12-14 PROCEDURE — 99233 PR SUBSEQUENT HOSPITAL CARE,LEVL III: ICD-10-PCS | Mod: GC,,, | Performed by: NEUROLOGICAL SURGERY

## 2021-12-14 PROCEDURE — 27000221 HC OXYGEN, UP TO 24 HOURS

## 2021-12-14 PROCEDURE — 95720 EEG PHY/QHP EA INCR W/VEEG: CPT | Mod: ,,, | Performed by: PSYCHIATRY & NEUROLOGY

## 2021-12-14 PROCEDURE — 95720 PR EEG, W/VIDEO, CONT RECORD, I&R, >12<26 HRS: ICD-10-PCS | Mod: ,,, | Performed by: PSYCHIATRY & NEUROLOGY

## 2021-12-14 PROCEDURE — 94668 MNPJ CHEST WALL SBSQ: CPT

## 2021-12-14 PROCEDURE — 95714 VEEG EA 12-26 HR UNMNTR: CPT

## 2021-12-14 PROCEDURE — 94640 AIRWAY INHALATION TREATMENT: CPT

## 2021-12-14 PROCEDURE — 63600175 PHARM REV CODE 636 W HCPCS: Mod: JG

## 2021-12-14 PROCEDURE — 25000242 PHARM REV CODE 250 ALT 637 W/ HCPCS: Performed by: PSYCHIATRY & NEUROLOGY

## 2021-12-14 PROCEDURE — 63600175 PHARM REV CODE 636 W HCPCS: Performed by: NURSE PRACTITIONER

## 2021-12-14 RX ORDER — FUROSEMIDE 10 MG/ML
40 INJECTION INTRAMUSCULAR; INTRAVENOUS ONCE
Status: COMPLETED | OUTPATIENT
Start: 2021-12-14 | End: 2021-12-14

## 2021-12-14 RX ORDER — IPRATROPIUM BROMIDE AND ALBUTEROL SULFATE 2.5; .5 MG/3ML; MG/3ML
3 SOLUTION RESPIRATORY (INHALATION) EVERY 4 HOURS
Status: DISCONTINUED | OUTPATIENT
Start: 2021-12-14 | End: 2021-12-30

## 2021-12-14 RX ORDER — SODIUM CHLORIDE FOR INHALATION 3 %
4 VIAL, NEBULIZER (ML) INHALATION ONCE
Status: COMPLETED | OUTPATIENT
Start: 2021-12-14 | End: 2021-12-15

## 2021-12-14 RX ORDER — ALBUMIN HUMAN 250 G/1000ML
12.5 SOLUTION INTRAVENOUS ONCE
Status: COMPLETED | OUTPATIENT
Start: 2021-12-14 | End: 2021-12-14

## 2021-12-14 RX ORDER — FUROSEMIDE 10 MG/ML
60 INJECTION INTRAMUSCULAR; INTRAVENOUS ONCE
Status: COMPLETED | OUTPATIENT
Start: 2021-12-14 | End: 2021-12-14

## 2021-12-14 RX ORDER — NICARDIPINE HYDROCHLORIDE 0.2 MG/ML
0-15 INJECTION INTRAVENOUS CONTINUOUS
Status: DISCONTINUED | OUTPATIENT
Start: 2021-12-14 | End: 2021-12-17

## 2021-12-14 RX ADMIN — FUROSEMIDE 60 MG: 40 INJECTION, SOLUTION INTRAMUSCULAR; INTRAVENOUS at 04:12

## 2021-12-14 RX ADMIN — IPRATROPIUM BROMIDE AND ALBUTEROL SULFATE 3 ML: 2.5; .5 SOLUTION RESPIRATORY (INHALATION) at 10:12

## 2021-12-14 RX ADMIN — NICARDIPINE HYDROCHLORIDE 5 MG/HR: 0.2 INJECTION, SOLUTION INTRAVENOUS at 10:12

## 2021-12-14 RX ADMIN — LEVETIRACETAM 500 MG: 500 INJECTION, SOLUTION INTRAVENOUS at 09:12

## 2021-12-14 RX ADMIN — ALBUMIN (HUMAN) 12.5 G: 12.5 SOLUTION INTRAVENOUS at 04:12

## 2021-12-14 RX ADMIN — SODIUM CHLORIDE 30 ML/HR: 3 INJECTION, SOLUTION INTRAVENOUS at 07:12

## 2021-12-14 RX ADMIN — IPRATROPIUM BROMIDE AND ALBUTEROL SULFATE 3 ML: 2.5; .5 SOLUTION RESPIRATORY (INHALATION) at 08:12

## 2021-12-14 RX ADMIN — NICARDIPINE HYDROCHLORIDE 2.5 MG/HR: 0.2 INJECTION INTRAVENOUS at 03:12

## 2021-12-14 RX ADMIN — IPRATROPIUM BROMIDE AND ALBUTEROL SULFATE 3 ML: 2.5; .5 SOLUTION RESPIRATORY (INHALATION) at 03:12

## 2021-12-14 RX ADMIN — FUROSEMIDE 40 MG: 10 INJECTION, SOLUTION INTRAMUSCULAR; INTRAVENOUS at 10:12

## 2021-12-14 RX ADMIN — METHYLPREDNISOLONE SODIUM SUCCINATE 20 MG: 40 INJECTION, POWDER, FOR SOLUTION INTRAMUSCULAR; INTRAVENOUS at 10:12

## 2021-12-14 RX ADMIN — ALBUMIN (HUMAN) 12.5 G: 12.5 SOLUTION INTRAVENOUS at 11:12

## 2021-12-14 NOTE — PT/OT/SLP PROGRESS
Speech Language Pathology      Selma Lux  MRN: [id number]    Patient not seen today secondary to on hold per nursing  . Will follow-up tomorrow if appropriate.

## 2021-12-14 NOTE — PLAN OF CARE
[facility] Care Plan    POC reviewed with Selma Lux and daughter at 0300. Pt's daughter Susu verbalized understanding. Questions and concerns addressed. Pt progressing toward goals. Will continue to monitor. See below and flowsheets for full assessment and VS info.   -Neuro exam unchanged   -Trending sodium levels, 3% gtt continued @ 30  -SBP < 140, MAP > 65 maintained by titrating Levo and Cardene   -Pt's breathing gurgly, nasal trumpet placed overnight   -Precedex gtt @  0.2      Neuro:  Marion Coma Scale  Best Eye Response: 2-->(E2) to pain  Best Motor Response: 5-->(M5) localizes pain  Best Verbal Response: 2-->(V2) incomprehensible speech  Marion Coma Scale Score: 9  Assessment Qualifiers: patient not sedated/intubated  Pupil PERRLA: yes     24hr Temp:  [97.4 °F (36.3 °C)-99.2 °F (37.3 °C)]     CV:   Rhythm: normal sinus rhythm  BP goals:   SBP < 140  MAP > 65    Resp:   O2 Device (Oxygen Therapy): nasal cannula       Plan: N/A    GI/:  SCOOBY Total Score: 0  Diet/Nutrition Received: NPO  Last Bowel Movement:  (PTA)  Voiding Characteristics: external catheter,incontinence    Intake/Output Summary (Last 24 hours) at 12/14/2021 0736  Last data filed at 12/14/2021 0705  Gross per 24 hour   Intake 1399.53 ml   Output 300 ml   Net 1099.53 ml     Unmeasured Output  Urine Occurrence: 1  Stool Occurrence: 0  Pad Count: 1    Labs/Accuchecks:  Recent Labs   Lab 12/14/21  0206   WBC 11.17   RBC 3.43*   HGB 9.9*   HCT 30.4*   *      Recent Labs   Lab 12/14/21  0206 12/14/21  0206 12/14/21  0413   *  122*   < > 126*   K 3.5  --   --    CO2 18*  --   --    CL 98  --   --    BUN 14  --   --    CREATININE 0.6  --   --    ALKPHOS 47*  --   --    ALT 13  --   --    AST 18  --   --    BILITOT 1.7*  --   --     < > = values in this interval not displayed.      Recent Labs   Lab 12/13/21  1121   INR 1.0   APTT 28.6      Recent Labs   Lab 12/13/21  1055          Electrolytes: Electrolytes replaced  Accuchecks:  none    Gtts:   dexmedetomidine (PRECEDEX) infusion 0.2 mcg/kg/hr (12/14/21 0705)    niCARdipine 5 mg/hr (12/14/21 0705)    NORepinephrine bitartrate-D5W Stopped (12/14/21 0254)    sodium chloride 3% 30 mL/hr (12/14/21 0723)       LDA/Wounds:  Lines/Drains/Airways       Central Venous Catheter Line              Percutaneous Central Line Insertion/Assessment - Triple Lumen  12/13/21 1450 right internal jugular <1 day              Drain              Female External Urinary Catheter 12/13/21 1424 <1 day              Airway                   Nasopharyngeal Airway <1 day              Arterial Line              Arterial Line 12/13/21 1632 Right Radial <1 day              Peripheral Intravenous Line                   Peripheral IV - Single Lumen 12/13/21 1054 20 G Left Antecubital <1 day         Peripheral IV - Single Lumen 12/13/21 1453 22 G Anterior;Left Wrist <1 day                  Wounds: No  Wound care consulted: No

## 2021-12-14 NOTE — PLAN OF CARE
12/14/21 1525   Post-Acute Status   Post-Acute Authorization Placement   Post-Acute Placement Status Pending medical clearance/testing     Anticipated therapy eval and recs 12/15.    Tiffany Stinson LCSW  Neurocritical Care   [facility]  [id number]

## 2021-12-14 NOTE — PROGRESS NOTES
1530--Pt very agitated, constantly throwing both legs over the side of the bed, trying to sit straight up in bed despite frequent repositioning by RN.  Pt also snoring loudly, maintaining O2sat >95%.  ABG ordered.  Orders received for precedex gtt and soft restraints.    1650--Pt still with snoring and slightly gurgled breathing.  O2sat still maintaining >95%.  Orders received for 2nd ABG.  Precedex gtt has been titrated off as of 1645.  Pt arousing only to sternal rub.  Dr. Egan at bedside and aware.    1700--Pt frequently alternating between requiring pressors and antihypertensives approx every 20 minutes.  Dr. Egan aware and at bedside to assess.

## 2021-12-14 NOTE — NURSING
0200-Pt's work of breathing increased and gurgly, but maintaining O2 sat > 95%. YOANA Carter at bedside. NT suction ordered. Thick secretions present. Pt now sounds less gurgly.     0330- Pt using abdominal muscles to breathe again with gurgly sounds. Nasal trumpet ordered and placed by RT in L nare.Continued NT suction with thick secretions. WCTM.

## 2021-12-14 NOTE — PT/OT/SLP PROGRESS
Physical Therapy      Patient Name:  Selma Lux   MRN:  [id number]    Patient not seen today secondary to pt taken for STAT CT scan after obtaining living environment and social history from pt daughter in am. Upon second attempt in pm pt was getting an EEG. Will follow-up when medically appropriate.    Living environment:    Pt lives in [address] with her daughter. Pt's other daughter and daughter's  occasionally stay to help as well. The home has 5 GLORIA with B handrails. The pt has 2 caregivers that come during the day to assist with ADLs for the past 2 years. Pt uses a cane to ambulate in the house and a RW to ambulate in the community. She also owns a BC and adjustable bed. Pt was attending OPPT 1x a week to treat her head aches. Pt enjoys singing and writing.    No billable units.

## 2021-12-14 NOTE — PROGRESS NOTES
Verbal order from Dr. Rodriguez to notify team if urine output drops below 100 cc/hr.    1600--Dr. Rodriguez notified that urine output was 60 cc this hour.  Orders received for lasix and albumin IV.

## 2021-12-14 NOTE — PLAN OF CARE
[facility]  Initial Discharge Assessment       Primary Care Provider: Bhargav Hirsch MD    Admission Diagnosis: SDH (subdural hematoma) [S06.5X9A]  Stroke [I63.9]    Admission Date: 12/13/2021  Expected Discharge Date: 12/21/2021    Discharge Barriers Identified: None    Payor: MEDICARE / Plan: MEDICARE PART A & B / Product Type: Government /     Extended Emergency Contact Information  Primary Emergency Contact: Rosy Rosado  Mobile Phone: [phone]  Relation: Daughter  Preferred language: English   needed? No  Secondary Emergency Contact: Md madhavi rosado  Mobile Phone: [phone]  Relation: Daughter  Preferred language: English   needed? No    Discharge Plan A: Rehab  Discharge Plan B: Home with family,Home Health      [id number] [address]  & [facility]  [address]  [facility] [address]  Phone: [phone] Fax: [phone]    [facility] [address] [address]  Phone: [phone] Fax: [phone]      Transferred from:  Home    History reviewed. No pertinent past medical history.      CM met with patient and Susu Rosado- (dtr) [phone]   Madhavi Rosado -  (dtr) [phone] in room for Discharge Planning Assessment.  Patient is sleeping and unable to answer questions.  Per daughters, the patient lives with daughterSusu,  in a single story house with 5 step(s) to enter.   Per daughters, the patient was independent with ADLS (needs standby assist for showers)  and used a rolling walker for ambulation.  Patient will have assistance from her daughters upon discharge.   Discharge Planning Booklet given to patient/family and discussed.  All questions addressed.  CM will follow for needs.    Per daughters, if patient needs inpatient rehab, they would like [facility].     Initial Assessment (most  recent)     Adult Discharge Assessment - 12/14/21 1537        Discharge Assessment    Assessment Type Discharge Planning Assessment     Confirmed/corrected address, phone number and insurance Yes     Confirmed Demographics Correct on Facesheet     Source of Information family     If unable to respond/provide information was family/caregiver contacted? Yes     Contact Name/Number Susu RosadoGeri (dtr) [phone]   Madhavi Alonzo Nevarez  (dt) [phone]     Communicated LETICIA with patient/caregiver Date not available/Unable to determine     Reason For Admission Acute SDH     Lives With child(yash), adult     Facility Arrived From: Home     Do you expect to return to your current living situation? Yes     Do you have help at home or someone to help you manage your care at home? Yes     Who are your caregiver(s) and their phone number(s)? Susu RosadoGeri (dtr) [phone]   Madhavi Alonzo Nevarez  (dtr) [phone]     Prior to hospitilization cognitive status: Alert/Oriented     Current cognitive status: Unable to Assess     Walking or Climbing Stairs Difficulty ambulation difficulty, requires equipment;stair climbing difficulty, requires equipment;transferring difficulty, requires equipment     Mobility Management rolling walker     Dressing/Bathing Difficulty none     Home Accessibility stairs to enter home     Number of Stairs, Main Entrance five     Home Layout Able to live on 1st floor     Equipment Currently Used at Home walker, rolling;bedside commode     Readmission within 30 days? No     Patient currently being followed by outpatient case management? No     Do you currently have service(s) that help you manage your care at home? Yes     Name and Contact number of agency [facility] NP visit     Is the pt/caregiver preference to resume services with current agency Yes     Do you take prescription medications? Yes     Do you have prescription coverage? Yes     Coverage Medicare/     Do you have any  problems affording any of your prescribed medications? No     Is the patient taking medications as prescribed? yes     Who is going to help you get home at discharge? Susu Rosado- (dtr) [phone]   Madhavi Rosado -  (dtr) [phone]     How do you get to doctors appointments? family or friend will provide     Are you on dialysis? No     Do you take coumadin? No     Discharge Plan A Rehab     Discharge Plan B Home with family;Home Health     DME Needed Upon Discharge  none     Discharge Plan discussed with: Adult children     Discharge Barriers Identified None        Relationship/Environment    Name(s) of Who Lives With Patient Susu Rosaod- (dtr) [phone]                  Charlette Benitez RN, CCRN-K, [facility]  Neuro-Critical Care   X [phone]

## 2021-12-14 NOTE — PROGRESS NOTES
[facility]  Neurocritical Care  Progress Note    Admit Date: 12/13/2021  Service Date: 12/14/2021  Length of Stay: 1    Subjective:     Chief Complaint: Acute subdural hematoma    History of Present Illness:   The patient is an 86-year-old female with past medical history of TIA, RA and Afib of Eliquis admitted to [facility] with Right SDH.  She was found on the floor this morning only responding to painful stimuli.  Patient occasionally says her name.GCS 12 noted for EMS.  PCC administered. She had a mechanical fall out of bed on Thursday hitting her head and has been complaining of intermittent headaches since. Her last dose of eliquis was on 12/11. Pending repeat CT head. NSGY consulted. In addition patient was found to be hyponatremic at 118-central line placed and hypertonic saline infusion initiated. Pending repeat CT head. Patient admitted to [facility] for close monitoring and higher level of care.       Hospital Course: 12/13/2021: patient admitted to [facility] s/p fall on 12/11 on Eliquis  12/14/2021 wheezing, prolonged expiratory phase.       Review of Symptoms: encephalopathic cannot participate    Medications:  Continuous Infusions:   dexmedetomidine (PRECEDEX) infusion 0.4 mcg/kg/hr (12/14/21 0900)    niCARdipine      NORepinephrine bitartrate-D5W Stopped (12/14/21 0254)    sodium chloride 3% 30 mL/hr (12/14/21 0900)     Scheduled Meds:   albuterol-ipratropium  3 mL Nebulization Q4H    amLODIPine  10 mg Oral Daily    atorvastatin  40 mg Oral Daily    baclofen  10 mg Oral TID    fentaNYL  15 mcg Intravenous Once    furosemide (LASIX) injection  40 mg Intravenous Once    hydrOXYchloroQUINE  200 mg Oral BID    levetiracetam IV  500 mg Intravenous Q12H    methylPREDNISolone sodium succinate  20 mg Intravenous Daily    montelukast  10 mg Oral Daily    mycophenolate  500 mg Oral BID    predniSONE  5 mg Oral Daily    senna-docusate 8.6-50 mg  1 tablet Oral Daily     PRN Meds:.acetaminophen,  albuterol-ipratropium, labetalol, ondansetron    OBJECTIVE:   Vital Signs (Most Recent):   Temp: 99.2 °F (37.3 °C) (12/14/21 0705)  Pulse: 108 (12/14/21 0929)  Resp: 20 (12/14/21 0929)  BP: 137/71 (12/14/21 0905)  SpO2: 100 % (12/14/21 0929)    Vital Signs (24h Range):   Temp:  [97.4 °F (36.3 °C)-99.2 °F (37.3 °C)] 99.2 °F (37.3 °C)  Pulse:  [] 108  Resp:  [13-37] 20  SpO2:  [94 %-100 %] 100 %  BP: ()/() 137/71  Arterial Line BP: (107-172)/(44-70) 154/58    ICP/CPP (Last 24h):        I & O (Last 24h):     Intake/Output Summary (Last 24 hours) at 12/14/2021 1004  Last data filed at 12/14/2021 0900  Gross per 24 hour   Intake 1519.27 ml   Output 300 ml   Net 1219.27 ml     Physical Exam:  GA: drowsy, comfortable, no acute distress.   HEENT: No scleral icterus or JVD. Neck supple  Pulmonary: Air entry equal to auscultation A/P/L. Wheezing ++, crackles ++  Cardiac: RRR, S1 & S2 w/o rubs/murmurs/gallops.   Abdominal: soft, non-tender, bowel sounds present x 4. No appreciable hepatosplenomegaly.  Skin: No jaundice, rashes, or visible lesions.  Neuro:  --Mental Status:  Drowsy, arousable to vigorous stimuli. Does not follow commands reliably  --Pupils 3.5 mm, PERRL.   --Corneal reflex not done in this arousable patient, gag, cough intact.  Moves bilateral ue/le symmetrically  MSK:  No edema in UE and LE    Vent Data:        Lines/Drains/Airway:        Nasopharyngeal Airway (Active)      Percutaneous Central Line Insertion/Assessment - Triple Lumen  12/13/21 1450 right internal jugular (Active)   Verification by X-ray Yes 12/14/21 0301   Site Assessment No redness;No swelling;No warmth;Bleeding 12/14/21 0301   Line Securement Device Secured with sutures 12/14/21 0301   Dressing Type Biopatch in place;Central line dressing 12/14/21 0301   Dressing Status New drainage 12/14/21 0301   Dressing Intervention Sterile dressing change 12/14/21 0301   Date on Dressing 12/14/21 12/14/21 0301   Dressing Due to be  Changed 12/21/21 12/14/21 0301   Distal Patency/Care flushed w/o difficulty 12/14/21 0301   Medial 1 Patency/Care flushed w/o difficulty 12/14/21 0301   Proximal 1 Patency/Care flushed w/o difficulty 12/14/21 0301   Line Necessity Review Medication caustic to vasculature;Poor venous access 12/14/21 0301      Arterial Line 12/13/21 1632 Right Radial (Active)   Site Assessment Clean;Dry;Intact;No redness;No swelling 12/14/21 0301   Line Status Pulsatile blood flow 12/14/21 0301   Art Line Waveform Appropriate;Square wave test performed 12/14/21 0301   Arterial Line Interventions Zeroed and calibrated;Leveled;Flushed per protocol;Connections checked and tightened 12/14/21 0301   Color/Movement/Sensation Capillary refill less than 3 sec 12/14/21 0301   Dressing Type Biopatch in place;Central line dressing 12/14/21 0301   Dressing Status Clean;Dry;Intact 12/14/21 0301   Dressing Intervention Integrity maintained 12/14/21 0301   Dressing Change Due 12/20/21 12/14/21 0301   Tubing Change Due 12/17/21 12/14/21 0301      Female External Urinary Catheter 12/13/21 1424 (Active)   Skin no redness;no breakdown;perineum cleansed w/ soap and water;female external urine collection device repositioned 12/14/21 0301   Tolerance no signs/symptoms of discomfort 12/14/21 0301   Suction Continuous suction at 60 mmHg 12/14/21 0301   Date of last wick change 12/14/21 12/14/21 0301   Time of last wick change 0301 12/14/21 0301   Output (mL) 300 mL 12/14/21 0601     Nutrition/Tube Feeds (if NPO state why): npo, aspiration precautions     Labs:  ABG:   Recent Labs   Lab 12/14/21  0926   PH 7.425   PO2 88   PCO2 34.3*   HCO3 22.5*   POCSATURATED 97   BE -2     BMP:  Recent Labs   Lab 12/14/21  0206 12/14/21  0206 12/14/21  0413   *  122*   < > 126*   K 3.5  --   --    CL 98  --   --    CO2 18*  --   --    BUN 14  --   --    CREATININE 0.6  --   --    GLU 79  --   --    MG 1.8  --   --    PHOS 3.5  --   --     < > = values in this  interval not displayed.     LFT:   Lab Results   Component Value Date    AST 18 12/14/2021    ALT 13 12/14/2021    ALKPHOS 47 (L) 12/14/2021    BILITOT 1.7 (H) 12/14/2021    ALBUMIN 3.1 (L) 12/14/2021    PROT 4.8 (L) 12/14/2021     CBC:   Lab Results   Component Value Date    WBC 11.17 12/14/2021    HGB 9.9 (L) 12/14/2021    HCT 30.4 (L) 12/14/2021    MCV 89 12/14/2021     (L) 12/14/2021     Microbiology x 7d:   Microbiology Results (last 7 days)     ** No results found for the last 168 hours. **        Imaging:  CXR 12/14 - interstitial edema. Lines in place  I personally reviewed the above image.  Today I independently reviewed pertinent medications, lines/drains/airways, imaging, cardiology results, laboratory results, microbiology results, notably:   ASSESSMENT/PLAN:     Active Hospital Problems    Diagnosis    *Acute subdural hematoma    Hyponatremia    Age-related physical debility    Leukocytosis    RA (rheumatoid arthritis)      Neuro:   SDH with brain compression  keppra for seizure prophylaxis  Acute encephalopathy likely multifactorial  Review EEG with epilepsy team    Pulmonary:   Solumedrol  duonebs  Lasix 40mg    Cardiac:   Connect to flotrac  Measure CVP hourly.  Consider albumin with lasix if SSV <12 or CVP <10  Intubation watch    Renal:    Continue hypertonic saline for hyponatremia.  Possibly hypervolemic hypernatremia  Lasix + albumin. UO goal >100/hr  BMP g41pmin    ID:   Afebrile, normal wbc  Will monitor    Hem/Onc:   Drop in hh will monitor  Transfuse for Hb <7 or 8 if associated with ypotension  plt count stable    Endocrine:    BS stable    Fluids/Electrolytes/Nutrition/GI:   Replete lytes as needed  Lines:  Art +  CVC +  ETT NA  Hill + strict I/O  NG  PEG NA    Proph:  DVT:  Constipation:  Last output:bowel regimen as needed  PUP: NA  VAP: NA    Family (daughters) updated at bedside    Uninterrupted Critical Care/Counseling Time (not including procedures):: 38 mins    Juan  Dc GARSIA, [facility]  Neurocritical care attending    Full Code    Juan Irwin MD  Neurocritical Care  [facility] - Neuro Critical Care

## 2021-12-14 NOTE — NURSING
1930- Pt agitated and not able to be redirected. Precedex gtt restarted    2050- Na redraw 121. 3% solution restarted.     2200- Pt breathing very gurgly. O2 sats remain > 95%. YOANA Carter at bedside to assess. Pt's HOB raised higher, breathing treatment and CPT ordered. WCTM.

## 2021-12-14 NOTE — ASSESSMENT & PLAN NOTE
86F with on eliquis s/p head trauma 1 week prior now with small aSDH and AMS:       --admitted to NCC  --CTH: R temp/paretial aSDH 5-6mm thickness, 3-5mm MLS, stable on repeat  --SBP<150  --HOB > 30  --hyponatremia, on 3%, f/u  --no acute neurosurgical intervention at this time  --rest of care per NCC  --Please page NSGY immediately for any changes in neuro status

## 2021-12-14 NOTE — SUBJECTIVE & OBJECTIVE
Interval history: CTH showed stable SDH. On 3% for hyponatremia, Na 126 this am.              Review of Systems    Objective:     Weight: 67 kg (147 lb 11.3 oz)  Body mass index is 25.35 kg/m².  Vital Signs (Most Recent):  Temp: 97.7 °F (36.5 °C) (12/14/21 1500)  Pulse: 97 (12/14/21 1600)  Resp: 17 (12/14/21 1600)  BP: (!) 107/56 (12/14/21 1600)  SpO2: 100 % (12/14/21 1600) Vital Signs (24h Range):  Temp:  [97.4 °F (36.3 °C)-99.2 °F (37.3 °C)] 97.7 °F (36.5 °C)  Pulse:  [] 97  Resp:  [16-37] 17  SpO2:  [95 %-100 %] 100 %  BP: ()/(51-73) 107/56  Arterial Line BP: (107-172)/(44-70) 128/52     Date 12/14/21 0700 - 12/15/21 0659   Shift 3258-2548 3353-5259 7079-0061 24 Hour Total   INTAKE   I.V.(mL/kg) 523.4(7.8) 117.8(1.8)  641.2(9.6)   Shift Total(mL/kg) 523.4(7.8) 117.8(1.8)  641.2(9.6)   OUTPUT   Urine(mL/kg/hr) 1600(3) 160  1760   Shift Total(mL/kg) 1600(23.9) 160(2.4)  1760(26.3)   Weight (kg) 67 67 67 67                   Female External Urinary Catheter 12/13/21 1424 (Active)       Neuro exam:    E1V1M4. (on precedex)  PERRL.   WD x4 L>R          Significant Labs:  Recent Labs   Lab 12/13/21  1055 12/13/21  1229 12/13/21  1533 12/13/21  1908 12/14/21  0206 12/14/21  0413 12/14/21  1130   GLU 90  --   --   --  79  --   --    *   < > 124*   < > 122*  122* 126* 130*   K 4.3  --   --   --  3.5  --   --    CL 84*  --   --   --  98  --   --    CO2 23  --   --   --  18*  --   --    BUN 13  --   --   --  14  --   --    CREATININE 0.8  --   --   --  0.6  --   --    CALCIUM 9.2  --   --   --  7.8*  --   --    MG  --   --  1.7  --  1.8  --   --     < > = values in this interval not displayed.     Recent Labs   Lab 12/13/21  1049 12/13/21  1055 12/14/21  0206   WBC  --  15.56* 11.17   HGB  --  12.0 9.9*   HCT 39 37.0 30.4*   PLT  --  140* 129*     Recent Labs   Lab 12/13/21  1121   INR 1.0   APTT 28.6     Microbiology Results (last 7 days)     ** No results found for the last 168 hours. **        All  pertinent labs from the last 24 hours have been reviewed.    Significant Diagnostics:  I have reviewed all pertinent imaging results/findings within the past 24 hours.   c/o abdominal pain with V/D, recently Dx with Candida

## 2021-12-14 NOTE — PROGRESS NOTES
[facility] Neuro Critical [facility]  Neurosurgery  Progress Note    Subjective:     History of Present Illness: 86-year-old female with past medical history of TIA comes to the emergency department for AMS.  She was found on the floor about 1 hour ago only responding to painful stimuli.  Patient occasionally says her name.GCS 12 noted for EMS.  Patient does take blood thinners. She had a mechanical fall out of bed on Thursday hitting her head and has been complaining of intermittent headaches since. Her last dose of eliquis was on 12/11. NSGY consulted for CTH with SDH      Post-Op Info:  * No surgery found *         Interval history: CTH showed stable SDH. On 3% for hyponatremia, Na 126 this am.              Review of Systems    Objective:     Weight: 67 kg (147 lb 11.3 oz)  Body mass index is 25.35 kg/m².  Vital Signs (Most Recent):  Temp: 97.7 °F (36.5 °C) (12/14/21 1500)  Pulse: 97 (12/14/21 1600)  Resp: 17 (12/14/21 1600)  BP: (!) 107/56 (12/14/21 1600)  SpO2: 100 % (12/14/21 1600) Vital Signs (24h Range):  Temp:  [97.4 °F (36.3 °C)-99.2 °F (37.3 °C)] 97.7 °F (36.5 °C)  Pulse:  [] 97  Resp:  [16-37] 17  SpO2:  [95 %-100 %] 100 %  BP: ()/(51-73) 107/56  Arterial Line BP: (107-172)/(44-70) 128/52     Date 12/14/21 0700 - 12/15/21 0659   Shift 3840-8487 5971-3771 1396-2165 24 Hour Total   INTAKE   I.V.(mL/kg) 523.4(7.8) 117.8(1.8)  641.2(9.6)   Shift Total(mL/kg) 523.4(7.8) 117.8(1.8)  641.2(9.6)   OUTPUT   Urine(mL/kg/hr) 1600(3) 160  1760   Shift Total(mL/kg) 1600(23.9) 160(2.4)  1760(26.3)   Weight (kg) 67 67 67 67                   Female External Urinary Catheter 12/13/21 1424 (Active)       Neuro exam:    E1V1M4. (on precedex)  PERRL.   WD x4 L>R          Significant Labs:  Recent Labs   Lab 12/13/21  1055 12/13/21  1229 12/13/21  1533 12/13/21  1908 12/14/21  0206 12/14/21  0413 12/14/21  1130   GLU 90  --   --   --  79  --   --    *   < > 124*   < > 122*  122* 126* 130*   K 4.3  --   --   --   3.5  --   --    CL 84*  --   --   --  98  --   --    CO2 23  --   --   --  18*  --   --    BUN 13  --   --   --  14  --   --    CREATININE 0.8  --   --   --  0.6  --   --    CALCIUM 9.2  --   --   --  7.8*  --   --    MG  --   --  1.7  --  1.8  --   --     < > = values in this interval not displayed.     Recent Labs   Lab 12/13/21  1049 12/13/21  1055 12/14/21  0206   WBC  --  15.56* 11.17   HGB  --  12.0 9.9*   HCT 39 37.0 30.4*   PLT  --  140* 129*     Recent Labs   Lab 12/13/21  1121   INR 1.0   APTT 28.6     Microbiology Results (last 7 days)     ** No results found for the last 168 hours. **        All pertinent labs from the last 24 hours have been reviewed.    Significant Diagnostics:  I have reviewed all pertinent imaging results/findings within the past 24 hours.    Assessment/Plan:     * Acute subdural hematoma  86F with on eliquis s/p head trauma 1 week prior now with small aSDH and AMS:       --admitted to NCC  --CTH: R temp/paretial aSDH 5-6mm thickness, 3-5mm MLS, stable on repeat  --SBP<150  --HOB > 30  --hyponatremia, on 3%, f/u  --no acute neurosurgical intervention at this time  --rest of care per NCC  --Please page NSGY immediately for any changes in neuro status        Rebeca Avila MD  Neurosurgery  [facility]

## 2021-12-15 ENCOUNTER — PATIENT MESSAGE (OUTPATIENT)
Dept: REHABILITATION | Facility: OTHER | Age: 86
End: 2021-12-15
Payer: MEDICARE

## 2021-12-15 LAB
ALBUMIN SERPL BCP-MCNC: 3.4 G/DL (ref 3.5–5.2)
ALLENS TEST: ABNORMAL
ALP SERPL-CCNC: 45 U/L (ref 55–135)
ALT SERPL W/O P-5'-P-CCNC: 14 U/L (ref 10–44)
ANION GAP SERPL CALC-SCNC: 16 MMOL/L (ref 8–16)
AST SERPL-CCNC: 13 U/L (ref 10–40)
BASOPHILS # BLD AUTO: 0 K/UL (ref 0–0.2)
BASOPHILS NFR BLD: 0 % (ref 0–1.9)
BILIRUB SERPL-MCNC: 1.5 MG/DL (ref 0.1–1)
BUN SERPL-MCNC: 19 MG/DL (ref 8–23)
CALCIUM SERPL-MCNC: 8 MG/DL (ref 8.7–10.5)
CHLORIDE SERPL-SCNC: 103 MMOL/L (ref 95–110)
CO2 SERPL-SCNC: 18 MMOL/L (ref 23–29)
CREAT SERPL-MCNC: 0.7 MG/DL (ref 0.5–1.4)
DELSYS: ABNORMAL
DIFFERENTIAL METHOD: ABNORMAL
EOSINOPHIL # BLD AUTO: 0 K/UL (ref 0–0.5)
EOSINOPHIL NFR BLD: 0 % (ref 0–8)
ERYTHROCYTE [DISTWIDTH] IN BLOOD BY AUTOMATED COUNT: 15.7 % (ref 11.5–14.5)
EST. GFR  (AFRICAN AMERICAN): >60 ML/MIN/1.73 M^2
EST. GFR  (NON AFRICAN AMERICAN): >60 ML/MIN/1.73 M^2
GLUCOSE SERPL-MCNC: 163 MG/DL (ref 70–110)
HCO3 UR-SCNC: 23.3 MMOL/L (ref 24–28)
HCT VFR BLD AUTO: 28.1 % (ref 37–48.5)
HCT VFR BLD CALC: 27 %PCV (ref 36–54)
HGB BLD-MCNC: 9.1 G/DL (ref 12–16)
IMM GRANULOCYTES # BLD AUTO: 0.14 K/UL (ref 0–0.04)
IMM GRANULOCYTES NFR BLD AUTO: 1.6 % (ref 0–0.5)
LYMPHOCYTES # BLD AUTO: 0.3 K/UL (ref 1–4.8)
LYMPHOCYTES NFR BLD: 3.2 % (ref 18–48)
MAGNESIUM SERPL-MCNC: 1.8 MG/DL (ref 1.6–2.6)
MCH RBC QN AUTO: 28.7 PG (ref 27–31)
MCHC RBC AUTO-ENTMCNC: 32.4 G/DL (ref 32–36)
MCV RBC AUTO: 89 FL (ref 82–98)
MONOCYTES # BLD AUTO: 1 K/UL (ref 0.3–1)
MONOCYTES NFR BLD: 10.9 % (ref 4–15)
NEUTROPHILS # BLD AUTO: 7.4 K/UL (ref 1.8–7.7)
NEUTROPHILS NFR BLD: 84.3 % (ref 38–73)
NRBC BLD-RTO: 0 /100 WBC
PCO2 BLDA: 34 MMHG (ref 35–45)
PH SMN: 7.44 [PH] (ref 7.35–7.45)
PHOSPHATE SERPL-MCNC: 3.7 MG/DL (ref 2.7–4.5)
PLATELET # BLD AUTO: 114 K/UL (ref 150–450)
PMV BLD AUTO: 9.8 FL (ref 9.2–12.9)
PO2 BLDA: 100 MMHG (ref 80–100)
POC BE: -1 MMOL/L
POC SATURATED O2: 98 % (ref 95–100)
POC TCO2: 24 MMOL/L (ref 23–27)
POTASSIUM SERPL-SCNC: 3.2 MMOL/L (ref 3.5–5.1)
PROT SERPL-MCNC: 5.1 G/DL (ref 6–8.4)
RBC # BLD AUTO: 3.17 M/UL (ref 4–5.4)
SAMPLE: ABNORMAL
SITE: ABNORMAL
SODIUM SERPL-SCNC: 134 MMOL/L (ref 136–145)
SODIUM SERPL-SCNC: 137 MMOL/L (ref 136–145)
SODIUM SERPL-SCNC: 138 MMOL/L (ref 136–145)
WBC # BLD AUTO: 8.81 K/UL (ref 3.9–12.7)

## 2021-12-15 PROCEDURE — 94761 N-INVAS EAR/PLS OXIMETRY MLT: CPT

## 2021-12-15 PROCEDURE — 80053 COMPREHEN METABOLIC PANEL: CPT

## 2021-12-15 PROCEDURE — 99900035 HC TECH TIME PER 15 MIN (STAT)

## 2021-12-15 PROCEDURE — 31720 CLEARANCE OF AIRWAYS: CPT

## 2021-12-15 PROCEDURE — 25000242 PHARM REV CODE 250 ALT 637 W/ HCPCS

## 2021-12-15 PROCEDURE — 99233 PR SUBSEQUENT HOSPITAL CARE,LEVL III: ICD-10-PCS | Mod: GC,,, | Performed by: NEUROLOGICAL SURGERY

## 2021-12-15 PROCEDURE — 94640 AIRWAY INHALATION TREATMENT: CPT

## 2021-12-15 PROCEDURE — 84295 ASSAY OF SERUM SODIUM: CPT | Mod: 91 | Performed by: PHYSICIAN ASSISTANT

## 2021-12-15 PROCEDURE — 85025 COMPLETE CBC W/AUTO DIFF WBC: CPT

## 2021-12-15 PROCEDURE — 36573 INSJ PICC RS&I 5 YR+: CPT

## 2021-12-15 PROCEDURE — 25000003 PHARM REV CODE 250: Performed by: PSYCHIATRY & NEUROLOGY

## 2021-12-15 PROCEDURE — C1751 CATH, INF, PER/CENT/MIDLINE: HCPCS

## 2021-12-15 PROCEDURE — 27000221 HC OXYGEN, UP TO 24 HOURS

## 2021-12-15 PROCEDURE — 25000003 PHARM REV CODE 250: Performed by: NURSE PRACTITIONER

## 2021-12-15 PROCEDURE — 99900026 HC AIRWAY MAINTENANCE (STAT)

## 2021-12-15 PROCEDURE — 83735 ASSAY OF MAGNESIUM: CPT

## 2021-12-15 PROCEDURE — 84100 ASSAY OF PHOSPHORUS: CPT

## 2021-12-15 PROCEDURE — 20000000 HC ICU ROOM

## 2021-12-15 PROCEDURE — A4216 STERILE WATER/SALINE, 10 ML: HCPCS | Performed by: PSYCHIATRY & NEUROLOGY

## 2021-12-15 PROCEDURE — 63600175 PHARM REV CODE 636 W HCPCS: Performed by: NURSE PRACTITIONER

## 2021-12-15 PROCEDURE — 63600175 PHARM REV CODE 636 W HCPCS: Performed by: PSYCHIATRY & NEUROLOGY

## 2021-12-15 PROCEDURE — 95718 PR EEG, W/VIDEO, CONT RECORD, I&R, 2-12 HRS: ICD-10-PCS | Mod: ,,, | Performed by: PSYCHIATRY & NEUROLOGY

## 2021-12-15 PROCEDURE — 99291 PR CRITICAL CARE, E/M 30-74 MINUTES: ICD-10-PCS | Mod: ,,, | Performed by: PSYCHIATRY & NEUROLOGY

## 2021-12-15 PROCEDURE — 92610 EVALUATE SWALLOWING FUNCTION: CPT

## 2021-12-15 PROCEDURE — 63600175 PHARM REV CODE 636 W HCPCS: Performed by: PHYSICIAN ASSISTANT

## 2021-12-15 PROCEDURE — 25000242 PHARM REV CODE 250 ALT 637 W/ HCPCS: Performed by: STUDENT IN AN ORGANIZED HEALTH CARE EDUCATION/TRAINING PROGRAM

## 2021-12-15 PROCEDURE — 95718 EEG PHYS/QHP 2-12 HR W/VEEG: CPT | Mod: ,,, | Performed by: PSYCHIATRY & NEUROLOGY

## 2021-12-15 PROCEDURE — 76937 US GUIDE VASCULAR ACCESS: CPT

## 2021-12-15 PROCEDURE — 25000242 PHARM REV CODE 250 ALT 637 W/ HCPCS: Performed by: PSYCHIATRY & NEUROLOGY

## 2021-12-15 PROCEDURE — 25000003 PHARM REV CODE 250

## 2021-12-15 PROCEDURE — P9047 ALBUMIN (HUMAN), 25%, 50ML: HCPCS | Mod: JG

## 2021-12-15 PROCEDURE — 63600175 PHARM REV CODE 636 W HCPCS

## 2021-12-15 PROCEDURE — 94668 MNPJ CHEST WALL SBSQ: CPT

## 2021-12-15 PROCEDURE — 99233 SBSQ HOSP IP/OBS HIGH 50: CPT | Mod: GC,,, | Performed by: NEUROLOGICAL SURGERY

## 2021-12-15 PROCEDURE — 37799 UNLISTED PX VASCULAR SURGERY: CPT

## 2021-12-15 PROCEDURE — 84295 ASSAY OF SERUM SODIUM: CPT | Performed by: PHYSICIAN ASSISTANT

## 2021-12-15 PROCEDURE — 99291 CRITICAL CARE FIRST HOUR: CPT | Mod: ,,, | Performed by: PSYCHIATRY & NEUROLOGY

## 2021-12-15 RX ORDER — ALBUMIN HUMAN 250 G/1000ML
12.5 SOLUTION INTRAVENOUS ONCE
Status: COMPLETED | OUTPATIENT
Start: 2021-12-15 | End: 2021-12-15

## 2021-12-15 RX ORDER — MAGNESIUM SULFATE HEPTAHYDRATE 40 MG/ML
2 INJECTION, SOLUTION INTRAVENOUS
Status: DISCONTINUED | OUTPATIENT
Start: 2021-12-15 | End: 2021-12-16

## 2021-12-15 RX ORDER — SODIUM CHLORIDE 0.9 % (FLUSH) 0.9 %
10 SYRINGE (ML) INJECTION
Status: DISCONTINUED | OUTPATIENT
Start: 2021-12-15 | End: 2022-01-04 | Stop reason: HOSPADM

## 2021-12-15 RX ORDER — FUROSEMIDE 10 MG/ML
40 INJECTION INTRAMUSCULAR; INTRAVENOUS ONCE
Status: COMPLETED | OUTPATIENT
Start: 2021-12-15 | End: 2021-12-15

## 2021-12-15 RX ORDER — POTASSIUM CHLORIDE 29.8 MG/ML
80 INJECTION INTRAVENOUS
Status: DISCONTINUED | OUTPATIENT
Start: 2021-12-15 | End: 2021-12-16

## 2021-12-15 RX ORDER — SODIUM CHLORIDE 0.9 % (FLUSH) 0.9 %
10 SYRINGE (ML) INJECTION EVERY 6 HOURS
Status: DISCONTINUED | OUTPATIENT
Start: 2021-12-15 | End: 2022-01-04 | Stop reason: HOSPADM

## 2021-12-15 RX ORDER — FUROSEMIDE 10 MG/ML
40 INJECTION INTRAMUSCULAR; INTRAVENOUS ONCE
Status: DISCONTINUED | OUTPATIENT
Start: 2021-12-15 | End: 2021-12-15

## 2021-12-15 RX ORDER — FUROSEMIDE 10 MG/ML
60 INJECTION INTRAMUSCULAR; INTRAVENOUS ONCE
Status: COMPLETED | OUTPATIENT
Start: 2021-12-15 | End: 2021-12-15

## 2021-12-15 RX ORDER — POTASSIUM CHLORIDE 29.8 MG/ML
40 INJECTION INTRAVENOUS
Status: DISCONTINUED | OUTPATIENT
Start: 2021-12-15 | End: 2021-12-16

## 2021-12-15 RX ORDER — MAGNESIUM SULFATE HEPTAHYDRATE 40 MG/ML
4 INJECTION, SOLUTION INTRAVENOUS
Status: DISCONTINUED | OUTPATIENT
Start: 2021-12-15 | End: 2021-12-16

## 2021-12-15 RX ORDER — POTASSIUM CHLORIDE 14.9 MG/ML
60 INJECTION INTRAVENOUS
Status: DISCONTINUED | OUTPATIENT
Start: 2021-12-15 | End: 2021-12-16

## 2021-12-15 RX ORDER — METOPROLOL TARTRATE 1 MG/ML
5 INJECTION, SOLUTION INTRAVENOUS ONCE
Status: COMPLETED | OUTPATIENT
Start: 2021-12-15 | End: 2021-12-15

## 2021-12-15 RX ORDER — MUPIROCIN 20 MG/G
OINTMENT TOPICAL 2 TIMES DAILY
Status: DISPENSED | OUTPATIENT
Start: 2021-12-15 | End: 2021-12-20

## 2021-12-15 RX ORDER — METOPROLOL TARTRATE 1 MG/ML
INJECTION, SOLUTION INTRAVENOUS
Status: COMPLETED
Start: 2021-12-15 | End: 2021-12-15

## 2021-12-15 RX ORDER — ACETYLCYSTEINE 100 MG/ML
4 SOLUTION ORAL; RESPIRATORY (INHALATION)
Status: DISCONTINUED | OUTPATIENT
Start: 2021-12-15 | End: 2021-12-19 | Stop reason: ALTCHOICE

## 2021-12-15 RX ADMIN — ALBUMIN (HUMAN) 12.5 G: 12.5 SOLUTION INTRAVENOUS at 10:12

## 2021-12-15 RX ADMIN — SODIUM CHLORIDE SOLN NEBU 3% 4 ML: 3 NEBU SOLN at 01:12

## 2021-12-15 RX ADMIN — IPRATROPIUM BROMIDE AND ALBUTEROL SULFATE 3 ML: 2.5; .5 SOLUTION RESPIRATORY (INHALATION) at 03:12

## 2021-12-15 RX ADMIN — IPRATROPIUM BROMIDE AND ALBUTEROL SULFATE 3 ML: 2.5; .5 SOLUTION RESPIRATORY (INHALATION) at 12:12

## 2021-12-15 RX ADMIN — LEVETIRACETAM 500 MG: 500 INJECTION, SOLUTION INTRAVENOUS at 09:12

## 2021-12-15 RX ADMIN — ACETYLCYSTEINE 4 ML: 100 INHALANT RESPIRATORY (INHALATION) at 03:12

## 2021-12-15 RX ADMIN — METOROPROLOL TARTRATE 5 MG: 5 INJECTION, SOLUTION INTRAVENOUS at 11:12

## 2021-12-15 RX ADMIN — METOPROLOL TARTRATE 5 MG: 1 INJECTION, SOLUTION INTRAVENOUS at 11:12

## 2021-12-15 RX ADMIN — Medication 10 ML: at 05:12

## 2021-12-15 RX ADMIN — FUROSEMIDE 60 MG: 10 INJECTION, SOLUTION INTRAMUSCULAR; INTRAVENOUS at 09:12

## 2021-12-15 RX ADMIN — DEXMEDETOMIDINE HYDROCHLORIDE 0.6 MCG/KG/HR: 4 INJECTION, SOLUTION INTRAVENOUS at 09:12

## 2021-12-15 RX ADMIN — ACETYLCYSTEINE 4 ML: 100 INHALANT RESPIRATORY (INHALATION) at 08:12

## 2021-12-15 RX ADMIN — IPRATROPIUM BROMIDE AND ALBUTEROL SULFATE 3 ML: 2.5; .5 SOLUTION RESPIRATORY (INHALATION) at 08:12

## 2021-12-15 RX ADMIN — LABETALOL HYDROCHLORIDE 5 MG: 5 INJECTION, SOLUTION INTRAVENOUS at 07:12

## 2021-12-15 RX ADMIN — MUPIROCIN: 20 OINTMENT TOPICAL at 09:12

## 2021-12-15 RX ADMIN — FUROSEMIDE 40 MG: 10 INJECTION, SOLUTION INTRAMUSCULAR; INTRAVENOUS at 09:12

## 2021-12-15 RX ADMIN — POTASSIUM CHLORIDE 60 MEQ: 200 INJECTION, SOLUTION INTRAVENOUS at 03:12

## 2021-12-15 RX ADMIN — FUROSEMIDE 40 MG: 40 INJECTION, SOLUTION INTRAMUSCULAR; INTRAVENOUS at 05:12

## 2021-12-15 RX ADMIN — METHYLPREDNISOLONE SODIUM SUCCINATE 20 MG: 40 INJECTION, POWDER, FOR SOLUTION INTRAMUSCULAR; INTRAVENOUS at 09:12

## 2021-12-15 NOTE — PLAN OF CARE
[facility] Care Plan    POC reviewed with Selma Lux and family at 0300. Pt unable to verbalize understanding. Questions and concerns addressed. No acute events overnight. Pt progressing toward goals. Will continue to monitor. See below and flowsheets for full assessment and VS info. Lasix given at 545.       Neuro:  [place] Coma Scale  Best Eye Response: 3-->(E3) to speech  Best Motor Response: 5-->(M5) localizes pain  Best Verbal Response: 2-->(V2) incomprehensible speech  [place] Coma Scale Score: 10  Assessment Qualifiers: patient not sedated/intubated  Pupil PERRLA: yes     24hr Temp:  [97.7 °F (36.5 °C)-99.2 °F (37.3 °C)]     CV:   Rhythm: sinus tachycardia  BP goals:   SBP < 160  MAP > 65    Resp:   O2 Device (Oxygen Therapy): nasal cannula w/ humidification       Plan: N/A    GI/:  SCOOBY Total Score: 0  Diet/Nutrition Received: NPO  Last Bowel Movement:  (Unknown)  Voiding Characteristics: urethral catheter (bladder)    Intake/Output Summary (Last 24 hours) at 12/15/2021 0628  Last data filed at 12/15/2021 0600  Gross per 24 hour   Intake 1189.1 ml   Output 3455 ml   Net -2265.9 ml     Unmeasured Output  Urine Occurrence: 1  Stool Occurrence: 0  Pad Count: 1    Labs/Accuchecks:  Recent Labs   Lab 12/13/21  1049 12/15/21  0202 12/15/21  0457   WBC  --  8.81  --    RBC  --  3.17*  --    HGB  --  9.1*  --    HCT   < > 28.1* 27*   PLT  --  114*  --     < > = values in this interval not displayed.      Recent Labs   Lab 12/15/21  0202      K 3.2*   CO2 18*      BUN 19   CREATININE 0.7   ALKPHOS 45*   ALT 14   AST 13   BILITOT 1.5*      Recent Labs   Lab 12/13/21  1121   INR 1.0   APTT 28.6      Recent Labs   Lab 12/13/21  1055          Electrolytes: Electrolytes replaced  Accuchecks: none    Gtts:   dexmedetomidine (PRECEDEX) infusion 0.4 mcg/kg/hr (12/15/21 0101)    niCARdipine Stopped (12/14/21 2029)       LDA/Wounds:  Lines/Drains/Airways       Central Venous Catheter Line               Percutaneous Central Line Insertion/Assessment - Triple Lumen  12/13/21 1450 right internal jugular 1 day              Drain                   Urethral Catheter 12/14/21 1031 <1 day              Airway                   Nasopharyngeal Airway 1 day              Arterial Line              Arterial Line 12/13/21 1632 Right Radial 1 day              Peripheral Intravenous Line                   Peripheral IV - Single Lumen 12/13/21 1054 20 G Left Antecubital 1 day         Peripheral IV - Single Lumen 12/13/21 1453 22 G Anterior;Left Wrist 1 day                  Wounds: No  Wound care consulted: No

## 2021-12-15 NOTE — PT/OT/SLP EVAL
Speech Language Pathology Evaluation  Bedside Swallow    Patient Name:  Selma Lux   MRN:  [id number]  Admitting Diagnosis: Acute subdural hematoma    Recommendations:                 General Recommendations:  Speech language evaluation and ongoing swallowing assessment  Diet recommendations:  NPO, NPO   Aspiration Precautions: Alternate means of nutrition/hydration, Frequent oral care and Strict aspiration precautions   General Precautions: Standard, aspiration,fall,NPO  Communication strategies:  go to room if call light pushed    History:     History of Present Illness:   The patient is an 86-year-old female with past medical history of TIA, RA and Afib of Eliquis admitted to [facility] with Right SDH.  She was found on the floor this morning only responding to painful stimuli.  Patient occasionally says her name.GCS 12 noted for EMS.  PCC administered. She had a mechanical fall out of bed on Thursday hitting her head and has been complaining of intermittent headaches since. Her last dose of eliquis was on 12/11. Pending repeat CT head. NSGY consulted. In addition patient was found to be hyponatremic at 118-central line placed and hypertonic saline infusion initiated. Pending repeat CT head. Patient admitted to [facility] for close monitoring and higher level of care.      History reviewed. No pertinent past medical history.    History reviewed. No pertinent surgical history.    Prior Intubation HX:  None during this admission    Modified Barium Swallow: none on file    Chest X-Rays: 12/15/21: 1. Retracted position of previously noted right jugular catheter as noted above.  2. Otherwise stable cardiac, mediastinal, and pulmonary findings to earlier exam.    Prior diet: currently NPO; NG tube attempted, but unsuccessful      Subjective     Pt verbalized mumbled unntelligible speech a few times t/o assessment.  Pt awake, but awareness poor.     Pain/Comfort:  · Pain Rating 1:  (unable to offer complaint)    Respiratory Status:  Nasal cannula, flow 1 L/min    Objective:     Oral Musculature Evaluation  · Oral Musculature: unable to assess due to poor participation/comprehension  · Dentition: present and adequate  · Secretion Management:  (wet, crackly breath sounds)  · Mucosal Quality: dry  · Volitional Cough: did not follow commands  · Volitional Swallow: did not follow commands  · Voice Prior to PO Intake: intermittently wet, hoarse    Bedside Swallow Eval:   Consistencies Assessed:  · Thin liquids ice x 1, 1/2 tsp x 1, full tsp x 1, cup sip x 1  · Puree 1/2 tsp x 1     Oral Phase:   · Slow oral transit time    Pharyngeal Phase:   · Coughing/choking after cup sip of thins  · regurgitation of puree - delayed  · multiple spontaneous swallows across consistencies    Compensatory Strategies  · None    Treatment: Education provided to pt regarding role of SLP, purpose of swallowing assessment, and recommendations, but pt is unable to demonstrate any understanding at this time as mental status is altered. Pt should remain strictly NPO at this time. Ongoing swallowing assessment to be conducted.  Speech/language/cognitive evaluation also to be conducted as appropriate.     Assessment:     Selma Lux is a 86 y.o. female with an SLP diagnosis of Dysphagia.  She also presents with altered mental status.     Goals:   Multidisciplinary Problems     SLP Goals        Problem: SLP Goal    Goal Priority Disciplines Outcome   SLP Goal     SLP Ongoing, Progressing   Description: Speech Language Pathology Goals  Goals expected to be met by 12/22:  1. Pt will participate in ongoing swallowing assessment.   2. Pt will participate in speech/language/cognitive evaluation when appropriate.                    Plan:     · Patient to be seen:  5 x/week   · Plan of Care expires:  01/14/22  · Plan of Care reviewed with:  patient   · SLP Follow-Up:  Yes       Discharge recommendations:   (tbd)     Time Tracking:     SLP Treatment Date:   12/15/21  Speech Start Time:   1343  Speech Stop Time:  1355     Speech Total Time (min):  12 min    Billable Minutes: Eval Swallow and Oral Function 12    12/15/2021

## 2021-12-15 NOTE — PT/OT/SLP PROGRESS
Physical Therapy      Patient Name:  Selma Lux   MRN:  [id number]    Patient not seen today secondary to RN hold. RN recommending to hold due to medical needs. Will follow-up as appropriate.

## 2021-12-15 NOTE — PROCEDURES
"Selma Lux is a 86 y.o. female patient.    Temp: 98 °F (36.7 °C) (12/15/21 1505)  Pulse: (!) 113 (12/15/21 1549)  Resp: (!) 21 (12/15/21 1549)  BP: 127/63 (12/15/21 1305)  SpO2: 100 % (12/15/21 1549)  Weight: 67 kg (147 lb 11.3 oz) (12/13/21 1700)  Height: 5' 4" (162.6 cm) (12/14/21 0800)    PICC  Date/Time: 12/15/2021 4:15 PM  Performed by: Fred Saenz RN  Assisting provider: Gin Rahman LPN  Consent Done: Yes  Time out: Immediately prior to procedure a time out was called to verify the correct patient, procedure, equipment, support staff and site/side marked as required  Indications: med administration and vascular access  Anesthesia: local infiltration  Local anesthetic: lidocaine 1% without epinephrine  Anesthetic Total (mL): 2  Preparation: skin prepped with ChloraPrep  Skin prep agent dried: skin prep agent completely dried prior to procedure  Sterile barriers: all five maximum sterile barriers used - cap, mask, sterile gown, sterile gloves, and large sterile sheet  Hand hygiene: hand hygiene performed prior to central venous catheter insertion  Location details: right basilic  Catheter type: double lumen  Catheter size: 5 Fr  Catheter Length: 33cm    Ultrasound guidance: yes  Vessel Caliber: medium and patent, compressibility normal  Vascular Doppler: not done  Needle advanced into vessel with real time Ultrasound guidance.  Guidewire confirmed in vessel.  Image recorded and saved.  Sterile sheath used.  Number of attempts: 1  Post-procedure: blood return through all ports, chlorhexidine patch and sterile dressing applied  Technical procedures used: 3CG  Specimens: No  Implants: No  Assessment: placement verified by x-ray  Complications: none          Name GIN RAHMAN LPN  12/15/2021  "

## 2021-12-15 NOTE — PROGRESS NOTES
Upon nursing handoff RN noted central IJ line not secured and leaking. Sonali LEES notified, Stated that someone would stop by after sign out.

## 2021-12-15 NOTE — SUBJECTIVE & OBJECTIVE
Interval history: Nop acute events. Interval clinical improvement.               Review of Systems    Objective:     Weight: 67 kg (147 lb 11.3 oz)  Body mass index is 25.35 kg/m².  Vital Signs (Most Recent):  Temp: 98.6 °F (37 °C) (12/15/21 0305)  Pulse: 109 (12/15/21 0605)  Resp: (!) 22 (12/15/21 0605)  BP: 137/64 (12/15/21 0605)  SpO2: 100 % (12/15/21 0605) Vital Signs (24h Range):  Temp:  [97.7 °F (36.5 °C)-98.6 °F (37 °C)] 98.6 °F (37 °C)  Pulse:  [] 109  Resp:  [14-39] 22  SpO2:  [98 %-100 %] 100 %  BP: ()/(55-90) 137/64  Arterial Line BP: (102-160)/(47-61) 144/59                     Female External Urinary Catheter 12/13/21 1424 (Active)       Neuro exam:    E1V1M4. (on precedex)  PERRL.   WD x4 L>R          Significant Labs:  Recent Labs   Lab 12/13/21  1533 12/13/21  1908 12/14/21  0206 12/14/21  0413 12/14/21  1605 12/14/21  1818 12/15/21  0027 12/15/21  0202 12/15/21  0604   GLU  --   --  79  --  107  --   --  163*  --    *   < > 122*  122*   < > 131*   < > 134* 137 138   K  --   --  3.5  --  3.5  --   --  3.2*  --    CL  --   --  98  --  99  --   --  103  --    CO2  --   --  18*  --  19*  --   --  18*  --    BUN  --   --  14  --  15  --   --  19  --    CREATININE  --   --  0.6  --  0.7  --   --  0.7  --    CALCIUM  --   --  7.8*  --  8.0*  --   --  8.0*  --    MG 1.7  --  1.8  --   --   --   --  1.8  --     < > = values in this interval not displayed.     Recent Labs   Lab 12/14/21  0206 12/14/21  0206 12/14/21  1642 12/15/21  0202 12/15/21  0457   WBC 11.17  --  12.35 8.81  --    HGB 9.9*  --  9.7* 9.1*  --    HCT 30.4*   < > 29.6* 28.1* 27*   *  --  116* 114*  --     < > = values in this interval not displayed.     Recent Labs   Lab 12/13/21  1121   INR 1.0   APTT 28.6     Microbiology Results (last 7 days)     ** No results found for the last 168 hours. **        All pertinent labs from the last 24 hours have been reviewed.    Significant Diagnostics:  I have reviewed all  pertinent imaging results/findings within the past 24 hours.Interval history: No acute events.             Review of Systems    Objective:     Weight: 67 kg (147 lb 11.3 oz)  Body mass index is 25.35 kg/m².  Vital Signs (Most Recent):  Temp: 98.6 °F (37 °C) (12/15/21 0305)  Pulse: 109 (12/15/21 0605)  Resp: (!) 22 (12/15/21 0605)  BP: 137/64 (12/15/21 0605)  SpO2: 100 % (12/15/21 0605) Vital Signs (24h Range):  Temp:  [97.7 °F (36.5 °C)-98.6 °F (37 °C)] 98.6 °F (37 °C)  Pulse:  [] 109  Resp:  [14-39] 22  SpO2:  [98 %-100 %] 100 %  BP: ()/(55-90) 137/64  Arterial Line BP: (102-160)/(47-61) 144/59                     Female External Urinary Catheter 12/13/21 1424 (Active)       Neuro exam:    E1V1M4. (on precedex)  PERRL.   WD x4 L>R          Significant Labs:  Recent Labs   Lab 12/13/21  1533 12/13/21  1908 12/14/21  0206 12/14/21  0413 12/14/21  1605 12/14/21  1818 12/15/21  0027 12/15/21  0202 12/15/21  0604   GLU  --   --  79  --  107  --   --  163*  --    *   < > 122*  122*   < > 131*   < > 134* 137 138   K  --   --  3.5  --  3.5  --   --  3.2*  --    CL  --   --  98  --  99  --   --  103  --    CO2  --   --  18*  --  19*  --   --  18*  --    BUN  --   --  14  --  15  --   --  19  --    CREATININE  --   --  0.6  --  0.7  --   --  0.7  --    CALCIUM  --   --  7.8*  --  8.0*  --   --  8.0*  --    MG 1.7  --  1.8  --   --   --   --  1.8  --     < > = values in this interval not displayed.     Recent Labs   Lab 12/14/21  0206 12/14/21  0206 12/14/21  1642 12/15/21  0202 12/15/21  0457   WBC 11.17  --  12.35 8.81  --    HGB 9.9*  --  9.7* 9.1*  --    HCT 30.4*   < > 29.6* 28.1* 27*   *  --  116* 114*  --     < > = values in this interval not displayed.     Recent Labs   Lab 12/13/21  1121   INR 1.0   APTT 28.6     Microbiology Results (last 7 days)     ** No results found for the last 168 hours. **        All pertinent labs from the last 24 hours have been reviewed.    Significant  Diagnostics:  I have reviewed all pertinent imaging results/findings within the past 24 hours.    Neurosurgery Physical Exam

## 2021-12-15 NOTE — PROGRESS NOTES
[facility] Neuro [facility]  Neurocritical Care  Progress Note    Admit Date: 12/13/2021  Service Date: 12/15/2021  Length of Stay: 2    Subjective:     Chief Complaint: Acute subdural hematoma    History of Present Illness:   The patient is an 86-year-old female with past medical history of TIA, RA and Afib of Eliquis admitted to [facility] with Right SDH.  She was found on the floor this morning only responding to painful stimuli.  Patient occasionally says her name.GCS 12 noted for EMS.  PCC administered. She had a mechanical fall out of bed on Thursday hitting her head and has been complaining of intermittent headaches since. Her last dose of eliquis was on 12/11. Pending repeat CT head. NSGY consulted. In addition patient was found to be hyponatremic at 118-central line placed and hypertonic saline infusion initiated. Pending repeat CT head. Patient admitted to [facility] for close monitoring and higher level of care.       Hospital Course: 12/13/2021: patient admitted to [facility] s/p fall on 12/11 on Eliquis  12/14/2021 wheezing, prolonged expiratory phase.   12/15/2021 copious respiratory secretions. Remains encephalopathic. Review EEG      Review of Symptoms: encephalopathic cannot participate    Medications:  Continuous Infusions:   dexmedetomidine (PRECEDEX) infusion 0.3 mcg/kg/hr (12/15/21 0805)    niCARdipine 2.5 mg/hr (12/15/21 0805)     Scheduled Meds:   albuterol-ipratropium  3 mL Nebulization Q4H    amLODIPine  10 mg Oral Daily    atorvastatin  40 mg Oral Daily    baclofen  10 mg Oral TID    hydrOXYchloroQUINE  200 mg Oral BID    levetiracetam IV  500 mg Intravenous Q12H    methylPREDNISolone sodium succinate  20 mg Intravenous Daily    montelukast  10 mg Oral Daily    mupirocin   Nasal BID    mycophenolate  500 mg Oral BID    senna-docusate 8.6-50 mg  1 tablet Oral Daily     PRN Meds:.acetaminophen, albuterol-ipratropium, calcium gluconate IVPB, calcium gluconate IVPB, calcium gluconate IVPB,  labetalol, magnesium sulfate IVPB, magnesium sulfate IVPB, ondansetron, potassium chloride in water **AND** potassium chloride in water **AND** potassium chloride in water    OBJECTIVE:   Vital Signs (Most Recent):   Temp: 98.5 °F (36.9 °C) (12/15/21 0705)  Pulse: 106 (12/15/21 0817)  Resp: 17 (12/15/21 0817)  BP: (!) 151/75 (12/15/21 0805)  SpO2: 100 % (12/15/21 0817)    Vital Signs (24h Range):   Temp:  [97.7 °F (36.5 °C)-98.6 °F (37 °C)] 98.5 °F (36.9 °C)  Pulse:  [] 106  Resp:  [14-39] 17  SpO2:  [98 %-100 %] 100 %  BP: ()/(55-90) 151/75  Arterial Line BP: (102-160)/(47-61) 145/56    ICP/CPP (Last 24h):   CO:  [3.8 L/min-5.3 L/min] 5.3 L/min  CI:  [2.2 L/min/m2-3.1 L/min/m2] 3.1 L/min/m2    I & O (Last 24h):     Intake/Output Summary (Last 24 hours) at 12/15/2021 0907  Last data filed at 12/15/2021 0805  Gross per 24 hour   Intake 1352.42 ml   Output 4170 ml   Net -2817.58 ml     Physical Exam: unchanged from previous day  GA: drowsy, comfortable, no acute distress.   HEENT: No scleral icterus or JVD. Neck supple  Pulmonary: Air entry equal to auscultation A/P/L. Wheezing ++, crackles ++  Cardiac: RRR, S1 & S2 w/o rubs/murmurs/gallops.   Abdominal: soft, non-tender, bowel sounds present x 4. No appreciable hepatosplenomegaly.  Skin: No jaundice, rashes, or visible lesions.  Neuro:  --Mental Status:  Drowsy, arousable to vigorous stimuli. Spontaneous eye opening. Tracks visually (particularly to family at bedside). Does not follow commands reliably  --Pupils 3.5 mm, PERRL.   --Corneal reflex not done in this arousable patient, gag, cough intact.  Moves bilateral ue/le symmetrically  MSK:  No edema in UE and LE    Vent Data:   Oxygen Concentration (%):  [28] 28    Lines/Drains/Airway:        Nasopharyngeal Airway (Active)   Measured At Nare 12/16/21 0018   Secured Location Left 12/16/21 0018   Site Condition Cool;Dry 12/16/21 0018      PICC Double Lumen 12/15/21 1616 right basilic (Active)   Site  Assessment No drainage;No redness;No swelling;No warmth 12/15/21 1616   Extremity Assessment Distal to IV No abnormal discoloration;No redness;No swelling;No warmth 12/15/21 1616   Line Securement Device Secured with sutureless device 12/15/21 1616   Dressing Type Biopatch in place;Central line dressing 12/15/21 1616   Dressing Status Clean;Dry;Intact 12/15/21 1616   Dressing Intervention First dressing 12/15/21 1616   Date on Dressing 12/15/21 12/15/21 1616   Dressing Due to be Changed 12/22/21 12/15/21 1616   Left Lumen Patency/Care Blood return present;Normal saline locked;Flushed w/o difficulty 12/15/21 1616   Right Lumen Patency/Care Blood return present;Normal saline locked;Flushed w/o difficulty 12/15/21 1616   Current Insertion Depth (cm) 33 cm 12/15/21 1616   Current Exposed Catheter (cm) 0 cm 12/15/21 1616   Extremity Circumference (cm) 33 cm 12/15/21 1616   Waveform Normal 12/15/21 1616   Line Necessity Review Poor venous access 12/15/21 1616      Arterial Line 12/13/21 1632 Right Radial (Active)   Site Assessment Clean;Dry;Intact;No redness;No swelling 12/15/21 2305   Line Status Pulsatile blood flow 12/15/21 2305   Art Line Waveform Appropriate;Square wave test performed 12/15/21 2305   Arterial Line Interventions Zeroed and calibrated;Leveled 12/15/21 2305   Color/Movement/Sensation Capillary refill less than 3 sec 12/15/21 2305   Dressing Type Biopatch in place;Central line dressing 12/15/21 2305   Dressing Status Clean;Dry;Intact 12/15/21 2305   Dressing Intervention Integrity maintained 12/15/21 2305   Dressing Change Due 12/20/21 12/15/21 2305   Tubing Change Due 12/17/21 12/15/21 1505           Urethral Catheter 12/14/21 1031 (Active)   Site Assessment Clean;Intact 12/15/21 2305   Collection Container Urimeter 12/15/21 2305   Securement Method secured to top of thigh w/ adhesive device 12/15/21 2305   Catheter Care Performed yes 12/15/21 2305   Reason for Continuing Urinary Catheterization  Critically ill in ICU and requiring hourly monitoring of intake/output 12/15/21 2305   CAUTI Prevention Bundle Drainage bag/urimeter off the floor;Sheeting clip in use;No dependent loops or kinks;Drainage bag/urimeter not overfilled (<2/3 full) 12/15/21 1505   Output (mL) 100 mL 12/16/21 0200       Nutrition/Tube Feeds (if NPO state why): npo, aspiration precautions     Labs:  ABG:   Recent Labs   Lab 12/15/21  0457   PH 7.443   PO2 100   PCO2 34.0*   HCO3 23.3*   POCSATURATED 98   BE -1     BMP:  Recent Labs   Lab 12/15/21  0202 12/15/21  0202 12/15/21  0604      < > 138   K 3.2*  --   --      --   --    CO2 18*  --   --    BUN 19  --   --    CREATININE 0.7  --   --    *  --   --    MG 1.8  --   --    PHOS 3.7  --   --     < > = values in this interval not displayed.     LFT:   Lab Results   Component Value Date    AST 13 12/15/2021    ALT 14 12/15/2021    ALKPHOS 45 (L) 12/15/2021    BILITOT 1.5 (H) 12/15/2021    ALBUMIN 3.4 (L) 12/15/2021    PROT 5.1 (L) 12/15/2021     CBC:   Lab Results   Component Value Date    WBC 8.81 12/15/2021    HGB 9.1 (L) 12/15/2021    HCT 27 (L) 12/15/2021    MCV 89 12/15/2021     (L) 12/15/2021     Microbiology x 7d:   Microbiology Results (last 7 days)     ** No results found for the last 168 hours. **        Imaging:  CXR 12/14 - interstitial edema. Lines in place  I personally reviewed the above image.  Today I independently reviewed pertinent medications, lines/drains/airways, imaging, cardiology results, laboratory results, microbiology results, notably:   ASSESSMENT/PLAN:     Active Hospital Problems    Diagnosis    *Acute subdural hematoma    Hyponatremia    Age-related physical debility    Leukocytosis    RA (rheumatoid arthritis)      Neuro:   SDH with brain compression  keppra for seizure prophylaxis  Acute encephalopathy likely multifactorial  Review EEG with epilepsy team    Pulmonary:   Solumedrol  Duonebs/3% nacl/mucomyst  Repeat Lasix 40mg  and albumin  Trial of cough assist to mobilize secretions.     Cardiac:   flotrac for advanced hemodynamic monitoiring  Measure CVP hourly.  Intubation watch    Renal:    Off hypertonic saline. Na > 135  Possibly hypervolemic hypernatremia  Lasix + albumin. UO goal >100/hr  BMP [id number]    ID:   Afebrile, normal wbc  Will monitor    Hem/Onc:   Transfuse for Hb <7 or 8 if associated with hypotension  plt count slight down trend but stable    Endocrine:    BS stable    Fluids/Electrolytes/Nutrition/GI:   Replete lytes as needed  Lines:  Art +  CVC +  ETT NA  Hill + strict I/O  NG NA  PEG NA    Proph:  DVT:  Constipation:  Last output:bowel regimen as needed  PUP: NA  VAP: NA    Family (daughters) updated at bedside    Uninterrupted Critical Care/Counseling Time (not including procedures):: 37 mins    Juan Irwin MD, FNCS  Neurocritical care attending    Full Code    Juan Irwin MD  Neurocritical Care  [facility] - Neuro Critical Care

## 2021-12-15 NOTE — PROGRESS NOTES
[facility]  Neurosurgery  Progress Note    Subjective:     History of Present Illness: 86-year-old female with past medical history of TIA comes to the emergency department for AMS.  She was found on the floor about 1 hour ago only responding to painful stimuli.  Patient occasionally says her name.GCS 12 noted for EMS.  Patient does take blood thinners. She had a mechanical fall out of bed on Thursday hitting her head and has been complaining of intermittent headaches since. Her last dose of eliquis was on 12/11. NSGY consulted for CTH with SDH      Post-Op Info:  * No surgery found *         Interval history: Nop acute events. Interval clinical improvement.               Review of Systems    Objective:     Weight: 67 kg (147 lb 11.3 oz)  Body mass index is 25.35 kg/m².  Vital Signs (Most Recent):  Temp: 98.6 °F (37 °C) (12/15/21 0305)  Pulse: 109 (12/15/21 0605)  Resp: (!) 22 (12/15/21 0605)  BP: 137/64 (12/15/21 0605)  SpO2: 100 % (12/15/21 0605) Vital Signs (24h Range):  Temp:  [97.7 °F (36.5 °C)-98.6 °F (37 °C)] 98.6 °F (37 °C)  Pulse:  [] 109  Resp:  [14-39] 22  SpO2:  [98 %-100 %] 100 %  BP: ()/(55-90) 137/64  Arterial Line BP: (102-160)/(47-61) 144/59                     Female External Urinary Catheter 12/13/21 1424 (Active)       Neuro exam:    E1V1M4. (on precedex)  PERRL.   WD x4 L>R          Significant Labs:  Recent Labs   Lab 12/13/21  1533 12/13/21  1908 12/14/21  0206 12/14/21  0413 12/14/21  1605 12/14/21  1818 12/15/21  0027 12/15/21  0202 12/15/21  0604   GLU  --   --  79  --  107  --   --  163*  --    *   < > 122*  122*   < > 131*   < > 134* 137 138   K  --   --  3.5  --  3.5  --   --  3.2*  --    CL  --   --  98  --  99  --   --  103  --    CO2  --   --  18*  --  19*  --   --  18*  --    BUN  --   --  14  --  15  --   --  19  --    CREATININE  --   --  0.6  --  0.7  --   --  0.7  --    CALCIUM  --   --  7.8*  --  8.0*  --   --  8.0*  --    MG 1.7  --  1.8   --   --   --   --  1.8  --     < > = values in this interval not displayed.     Recent Labs   Lab 12/14/21  0206 12/14/21  0206 12/14/21  1642 12/15/21  0202 12/15/21  0457   WBC 11.17  --  12.35 8.81  --    HGB 9.9*  --  9.7* 9.1*  --    HCT 30.4*   < > 29.6* 28.1* 27*   *  --  116* 114*  --     < > = values in this interval not displayed.     Recent Labs   Lab 12/13/21  1121   INR 1.0   APTT 28.6     Microbiology Results (last 7 days)     ** No results found for the last 168 hours. **        All pertinent labs from the last 24 hours have been reviewed.    Significant Diagnostics:  I have reviewed all pertinent imaging results/findings within the past 24 hours.Interval history: No acute events.             Review of Systems    Objective:     Weight: 67 kg (147 lb 11.3 oz)  Body mass index is 25.35 kg/m².  Vital Signs (Most Recent):  Temp: 98.6 °F (37 °C) (12/15/21 0305)  Pulse: 109 (12/15/21 0605)  Resp: (!) 22 (12/15/21 0605)  BP: 137/64 (12/15/21 0605)  SpO2: 100 % (12/15/21 0605) Vital Signs (24h Range):  Temp:  [97.7 °F (36.5 °C)-98.6 °F (37 °C)] 98.6 °F (37 °C)  Pulse:  [] 109  Resp:  [14-39] 22  SpO2:  [98 %-100 %] 100 %  BP: ()/(55-90) 137/64  Arterial Line BP: (102-160)/(47-61) 144/59                     Female External Urinary Catheter 12/13/21 1424 (Active)       Neuro exam:    E1V1M4. (on precedex)  PERRL.   WD x4 L>R          Significant Labs:  Recent Labs   Lab 12/13/21  1533 12/13/21  1908 12/14/21  0206 12/14/21  0413 12/14/21  1605 12/14/21  1818 12/15/21  0027 12/15/21  0202 12/15/21  0604   GLU  --   --  79  --  107  --   --  163*  --    *   < > 122*  122*   < > 131*   < > 134* 137 138   K  --   --  3.5  --  3.5  --   --  3.2*  --    CL  --   --  98  --  99  --   --  103  --    CO2  --   --  18*  --  19*  --   --  18*  --    BUN  --   --  14  --  15  --   --  19  --    CREATININE  --   --  0.6  --  0.7  --   --  0.7  --    CALCIUM  --   --  7.8*  --  8.0*  --   --  8.0*   --    MG 1.7  --  1.8  --   --   --   --  1.8  --     < > = values in this interval not displayed.     Recent Labs   Lab 12/14/21  0206 12/14/21  0206 12/14/21  1642 12/15/21  0202 12/15/21  0457   WBC 11.17  --  12.35 8.81  --    HGB 9.9*  --  9.7* 9.1*  --    HCT 30.4*   < > 29.6* 28.1* 27*   *  --  116* 114*  --     < > = values in this interval not displayed.     Recent Labs   Lab 12/13/21  1121   INR 1.0   APTT 28.6     Microbiology Results (last 7 days)     ** No results found for the last 168 hours. **        All pertinent labs from the last 24 hours have been reviewed.    Significant Diagnostics:  I have reviewed all pertinent imaging results/findings within the past 24 hours.    Neurosurgery Physical Exam        Assessment/Plan:     * Acute subdural hematoma  86F with on eliquis s/p head trauma 1 week prior now with small aSDH and AMS.     No acute events. Interval imaprovement in exam. E4V4M6.      --admitted to NCC  --CTH: R temp/paretial aSDH 5-6mm thickness, 3-5mm MLS, stable on repeat  --SBP<150  --HOB > 30  --hyponatremia, on 3%, f/u  --no acute neurosurgical intervention at this time  --rest of care per NCC  --Please page NSGY immediately for any changes in neuro status        Branden López MD  Neurosurgery  [facility]

## 2021-12-15 NOTE — CONSULTS
D/L PICC placed in R basilic vein, 33cm in length with 0cm exposed. Arm circumference 33cm. Lot#[id number]

## 2021-12-15 NOTE — PLAN OF CARE
[facility] Care Plan    POC reviewed with Selma Lux and her daughters at 1400. Pt's daughters verbalized understanding. Questions and concerns addressed. Continuous EEG is progress.  Pt having periods of lucidity where she is oriented, and then periods of confusion.  Lasix and albumin x 2.  Hill placed.  Flowtrack.  CVP monitoring.  Pulmonary toileting.  Pt progressing toward goals. Will continue to monitor. See below and flowsheets for full assessment and VS info.       Neuro:  [place] Coma Scale  Best Eye Response: 2-->(E2) to pain  Best Motor Response: 5-->(M5) localizes pain  Best Verbal Response: 2-->(V2) incomprehensible speech  [place] Coma Scale Score: 9  Assessment Qualifiers: patient not sedated/intubated  Pupil PERRLA: yes     24 hr Temp:  [97.4 °F (36.3 °C)-99.2 °F (37.3 °C)]     CV:   Rhythm: sinus tachycardia  BP goals:   SBP < 160  MAP > 65    Resp:   O2 Device (Oxygen Therapy): nasal cannula       Plan: N/A    GI/:  SCOOBY Total Score: 0  Diet/Nutrition Received: NPO  Last Bowel Movement:  (PTA)  Voiding Characteristics: external catheter,incontinence    Intake/Output Summary (Last 24 hours) at 12/14/2021 1838  Last data filed at 12/14/2021 1800  Gross per 24 hour   Intake 1281.69 ml   Output 2460 ml   Net -1178.31 ml     Unmeasured Output  Urine Occurrence: 1  Stool Occurrence: 0  Pad Count: 1    Labs/Accuchecks:  Recent Labs   Lab 12/14/21  1642   WBC 12.35   RBC 3.36*   HGB 9.7*   HCT 29.6*   *      Recent Labs   Lab 12/14/21  0206 12/14/21  0413 12/14/21  1605   *  122*   < > 131*   K 3.5   < > 3.5   CO2 18*   < > 19*   CL 98   < > 99   BUN 14   < > 15   CREATININE 0.6   < > 0.7   ALKPHOS 47*  --   --    ALT 13  --   --    AST 18  --   --    BILITOT 1.7*  --   --     < > = values in this interval not displayed.      Recent Labs   Lab 12/13/21  1121   INR 1.0   APTT 28.6      Recent Labs   Lab 12/13/21  1055          Electrolytes: N/A - electrolytes WDL  Accuchecks:  none    Gtts:   dexmedetomidine (PRECEDEX) infusion 0.4 mcg/kg/hr (12/14/21 1800)    niCARdipine 2.5 mg/hr (12/14/21 1800)    NORepinephrine bitartrate-D5W Stopped (12/14/21 0254)    sodium chloride 3% 30 mL/hr (12/14/21 1800)       LDA/Wounds:  Lines/Drains/Airways       Central Venous Catheter Line              Percutaneous Central Line Insertion/Assessment - Triple Lumen  12/13/21 1450 right internal jugular 1 day              Drain                   Urethral Catheter 12/14/21 1031 <1 day              Airway                   Nasopharyngeal Airway <1 day              Arterial Line              Arterial Line 12/13/21 1632 Right Radial 1 day              Peripheral Intravenous Line                   Peripheral IV - Single Lumen 12/13/21 1054 20 G Left Antecubital 1 day         Peripheral IV - Single Lumen 12/13/21 1453 22 G Anterior;Left Wrist 1 day                  Wounds: No  Wound care consulted: No

## 2021-12-15 NOTE — PLAN OF CARE
[facility] Care Plan    POC reviewed with Selma Lux and family at 1700. Daughter verbalized understanding. Questions and concerns addressed. No acute events today. Pt progressing toward goals. Will continue to monitor. See below and flowsheets for full assessment and VS info.     -Removed TLC from right IJ  -PICC in place right upper arm  -x1 lasix given  -Responded well  -Attempted NGT, unsuccessful, MD aware  -Remains NPO      Neuro:  [place] Coma Scale  Best Eye Response: 3-->(E3) to speech  Best Motor Response: 6-->(M6) obeys commands  Best Verbal Response: 2-->(V2) incomprehensible speech  [place] Coma Scale Score: 11  Assessment Qualifiers: patient not sedated/intubated  Pupil PERRLA: yes     24 hr Temp:  [98 °F (36.7 °C)-98.6 °F (37 °C)]     CV:   Rhythm: sinus tachycardia  BP goals:   SBP < 160  MAP > 65    Resp:   O2 Device (Oxygen Therapy): nasal cannula w/ humidification  Oxygen Concentration (%): 28    Plan: N/A    GI/:  SCOOBY Total Score: 0  Diet/Nutrition Received: NPO  Last Bowel Movement: 12/13/21  Voiding Characteristics: urethral catheter (bladder)    Intake/Output Summary (Last 24 hours) at 12/15/2021 1722  Last data filed at 12/15/2021 1705  Gross per 24 hour   Intake 942.62 ml   Output 3925 ml   Net -2982.38 ml     Unmeasured Output  Urine Occurrence: 1  Stool Occurrence: 0  Pad Count: 1    Labs/Accuchecks:  Recent Labs   Lab 12/13/21  1049 12/15/21  0202 12/15/21  0457   WBC  --  8.81  --    RBC  --  3.17*  --    HGB  --  9.1*  --    HCT   < > 28.1* 27*   PLT  --  114*  --     < > = values in this interval not displayed.      Recent Labs   Lab 12/15/21  0202 12/15/21  0604 12/15/21  1323      < > 137   K 3.2*  --   --    CO2 18*  --   --      --   --    BUN 19  --   --    CREATININE 0.7  --   --    ALKPHOS 45*  --   --    ALT 14  --   --    AST 13  --   --    BILITOT 1.5*  --   --     < > = values in this interval not displayed.      Recent Labs   Lab 12/13/21  1121   INR 1.0   APTT  28.6      Recent Labs   Lab 12/13/21  1055          Electrolytes: Electrolytes replaced  Accuchecks: none    Gtts:   dexmedetomidine (PRECEDEX) infusion Stopped (12/15/21 1114)    niCARdipine Stopped (12/15/21 1015)       LDA/Wounds:  Lines/Drains/Airways       Peripherally Inserted Central Catheter Line              PICC Double Lumen 12/15/21 1616 right basilic <1 day              Drain                   Urethral Catheter 12/14/21 1031 1 day              Airway                   Nasopharyngeal Airway 1 day              Arterial Line              Arterial Line 12/13/21 1632 Right Radial 2 days              Peripheral Intravenous Line                   Peripheral IV - Single Lumen 12/13/21 1054 20 G Left Antecubital 2 days         Peripheral IV - Single Lumen 12/13/21 1453 22 G Anterior;Left Wrist 2 days                  Wounds: No  Wound care consulted: No

## 2021-12-15 NOTE — PLAN OF CARE
Problem: SLP Goal  Goal: SLP Goal  Description: Speech Language Pathology Goals  Goals expected to be met by 12/22:  1. Pt will participate in ongoing swallowing assessment.   2. Pt will participate in speech/language/cognitive evaluation when appropriate.   Outcome: Ongoing, Progressing    Bedside swallow study initiated.  Pt not safe or appropriate for PO diet at this time.  Ongoing swallowing assessment. SLC eval to be conducted as appropriate.

## 2021-12-15 NOTE — ASSESSMENT & PLAN NOTE
86F with on eliquis s/p head trauma 1 week prior now with small aSDH and AMS.     No acute events. Interval imaprovement in exam. E4V4M6.      --admitted to NCC  --CTH: R temp/paretial aSDH 5-6mm thickness, 3-5mm MLS, stable on repeat  --SBP<150  --HOB > 30  --hyponatremia, on 3%, f/u  --no acute neurosurgical intervention at this time  --rest of care per NCC  --Please page NSGY immediately for any changes in neuro status

## 2021-12-15 NOTE — PT/OT/SLP PROGRESS
Occupational Therapy      Patient Name:  Selma Lux   MRN:  [id number]    Patient not seen today secondary to Other (Comment) (RN requesting hold 2/2 medical needs this afternoon.). Will follow-up for OT evaluation.    Catalina Snyder OT  12/15/2021

## 2021-12-16 VITALS
TEMPERATURE: 97 F | HEART RATE: 86 BPM | HEIGHT: 64 IN | DIASTOLIC BLOOD PRESSURE: 80 MMHG | WEIGHT: 151 LBS | SYSTOLIC BLOOD PRESSURE: 135 MMHG | RESPIRATION RATE: 18 BRPM | BODY MASS INDEX: 25.78 KG/M2 | OXYGEN SATURATION: 98 %

## 2021-12-16 LAB
ALBUMIN SERPL BCP-MCNC: 4.1 G/DL (ref 3.5–5.2)
ALP SERPL-CCNC: 50 U/L (ref 55–135)
ALT SERPL W/O P-5'-P-CCNC: 13 U/L (ref 10–44)
ANION GAP SERPL CALC-SCNC: 15 MMOL/L (ref 8–16)
ANION GAP SERPL CALC-SCNC: 18 MMOL/L (ref 8–16)
ANISOCYTOSIS BLD QL SMEAR: SLIGHT
AST SERPL-CCNC: 12 U/L (ref 10–40)
BASOPHILS # BLD AUTO: 0.01 K/UL (ref 0–0.2)
BASOPHILS NFR BLD: 0.1 % (ref 0–1.9)
BILIRUB SERPL-MCNC: 1.8 MG/DL (ref 0.1–1)
BUN SERPL-MCNC: 16 MG/DL (ref 8–23)
BUN SERPL-MCNC: 20 MG/DL (ref 8–23)
BURR CELLS BLD QL SMEAR: ABNORMAL
CALCIUM SERPL-MCNC: 9.3 MG/DL (ref 8.7–10.5)
CALCIUM SERPL-MCNC: 9.4 MG/DL (ref 8.7–10.5)
CHLORIDE SERPL-SCNC: 103 MMOL/L (ref 95–110)
CHLORIDE SERPL-SCNC: 106 MMOL/L (ref 95–110)
CO2 SERPL-SCNC: 23 MMOL/L (ref 23–29)
CO2 SERPL-SCNC: 23 MMOL/L (ref 23–29)
CREAT SERPL-MCNC: 0.7 MG/DL (ref 0.5–1.4)
CREAT SERPL-MCNC: 0.8 MG/DL (ref 0.5–1.4)
DIFFERENTIAL METHOD: ABNORMAL
EOSINOPHIL # BLD AUTO: 0 K/UL (ref 0–0.5)
EOSINOPHIL NFR BLD: 0.1 % (ref 0–8)
ERYTHROCYTE [DISTWIDTH] IN BLOOD BY AUTOMATED COUNT: 15.8 % (ref 11.5–14.5)
EST. GFR  (AFRICAN AMERICAN): >60 ML/MIN/1.73 M^2
EST. GFR  (AFRICAN AMERICAN): >60 ML/MIN/1.73 M^2
EST. GFR  (NON AFRICAN AMERICAN): >60 ML/MIN/1.73 M^2
EST. GFR  (NON AFRICAN AMERICAN): >60 ML/MIN/1.73 M^2
GLUCOSE SERPL-MCNC: 126 MG/DL (ref 70–110)
GLUCOSE SERPL-MCNC: 131 MG/DL (ref 70–110)
HCT VFR BLD AUTO: 32.2 % (ref 37–48.5)
HGB BLD-MCNC: 10.4 G/DL (ref 12–16)
HYPOCHROMIA BLD QL SMEAR: ABNORMAL
IMM GRANULOCYTES # BLD AUTO: 0.14 K/UL (ref 0–0.04)
IMM GRANULOCYTES NFR BLD AUTO: 1 % (ref 0–0.5)
LYMPHOCYTES # BLD AUTO: 0.6 K/UL (ref 1–4.8)
LYMPHOCYTES NFR BLD: 4.4 % (ref 18–48)
MAGNESIUM SERPL-MCNC: 1.8 MG/DL (ref 1.6–2.6)
MCH RBC QN AUTO: 28.5 PG (ref 27–31)
MCHC RBC AUTO-ENTMCNC: 32.3 G/DL (ref 32–36)
MCV RBC AUTO: 88 FL (ref 82–98)
MONOCYTES # BLD AUTO: 2.2 K/UL (ref 0.3–1)
MONOCYTES NFR BLD: 16.1 % (ref 4–15)
NEUTROPHILS # BLD AUTO: 10.5 K/UL (ref 1.8–7.7)
NEUTROPHILS NFR BLD: 78.3 % (ref 38–73)
NRBC BLD-RTO: 0 /100 WBC
OVALOCYTES BLD QL SMEAR: ABNORMAL
PHOSPHATE SERPL-MCNC: 3 MG/DL (ref 2.7–4.5)
PLATELET # BLD AUTO: 152 K/UL (ref 150–450)
PLATELET BLD QL SMEAR: ABNORMAL
PMV BLD AUTO: 10 FL (ref 9.2–12.9)
POIKILOCYTOSIS BLD QL SMEAR: SLIGHT
POLYCHROMASIA BLD QL SMEAR: ABNORMAL
POTASSIUM SERPL-SCNC: 2.9 MMOL/L (ref 3.5–5.1)
POTASSIUM SERPL-SCNC: 3.5 MMOL/L (ref 3.5–5.1)
POTASSIUM SERPL-SCNC: 4 MMOL/L (ref 3.5–5.1)
POTASSIUM SERPL-SCNC: 4.2 MMOL/L (ref 3.5–5.1)
PROT SERPL-MCNC: 6.2 G/DL (ref 6–8.4)
RBC # BLD AUTO: 3.65 M/UL (ref 4–5.4)
SODIUM SERPL-SCNC: 144 MMOL/L (ref 136–145)
SODIUM SERPL-SCNC: 144 MMOL/L (ref 136–145)
WBC # BLD AUTO: 13.39 K/UL (ref 3.9–12.7)

## 2021-12-16 PROCEDURE — 99291 CRITICAL CARE FIRST HOUR: CPT | Mod: ,,, | Performed by: PSYCHIATRY & NEUROLOGY

## 2021-12-16 PROCEDURE — 99900035 HC TECH TIME PER 15 MIN (STAT)

## 2021-12-16 PROCEDURE — 97162 PT EVAL MOD COMPLEX 30 MIN: CPT

## 2021-12-16 PROCEDURE — 99232 PR SUBSEQUENT HOSPITAL CARE,LEVL II: ICD-10-PCS | Mod: GC,,, | Performed by: NEUROLOGICAL SURGERY

## 2021-12-16 PROCEDURE — 80048 BASIC METABOLIC PNL TOTAL CA: CPT

## 2021-12-16 PROCEDURE — 25000003 PHARM REV CODE 250: Performed by: PSYCHIATRY & NEUROLOGY

## 2021-12-16 PROCEDURE — 80053 COMPREHEN METABOLIC PANEL: CPT

## 2021-12-16 PROCEDURE — 94761 N-INVAS EAR/PLS OXIMETRY MLT: CPT

## 2021-12-16 PROCEDURE — 97112 NEUROMUSCULAR REEDUCATION: CPT

## 2021-12-16 PROCEDURE — A4216 STERILE WATER/SALINE, 10 ML: HCPCS | Performed by: PSYCHIATRY & NEUROLOGY

## 2021-12-16 PROCEDURE — 97535 SELF CARE MNGMENT TRAINING: CPT

## 2021-12-16 PROCEDURE — 63600175 PHARM REV CODE 636 W HCPCS: Performed by: PSYCHIATRY & NEUROLOGY

## 2021-12-16 PROCEDURE — 63600175 PHARM REV CODE 636 W HCPCS: Performed by: PHYSICIAN ASSISTANT

## 2021-12-16 PROCEDURE — 25000242 PHARM REV CODE 250 ALT 637 W/ HCPCS

## 2021-12-16 PROCEDURE — 27000221 HC OXYGEN, UP TO 24 HOURS

## 2021-12-16 PROCEDURE — 84100 ASSAY OF PHOSPHORUS: CPT

## 2021-12-16 PROCEDURE — 20000000 HC ICU ROOM

## 2021-12-16 PROCEDURE — 85025 COMPLETE CBC W/AUTO DIFF WBC: CPT

## 2021-12-16 PROCEDURE — 97165 OT EVAL LOW COMPLEX 30 MIN: CPT

## 2021-12-16 PROCEDURE — 99232 SBSQ HOSP IP/OBS MODERATE 35: CPT | Mod: GC,,, | Performed by: NEUROLOGICAL SURGERY

## 2021-12-16 PROCEDURE — 83735 ASSAY OF MAGNESIUM: CPT

## 2021-12-16 PROCEDURE — 99900026 HC AIRWAY MAINTENANCE (STAT)

## 2021-12-16 PROCEDURE — 94640 AIRWAY INHALATION TREATMENT: CPT

## 2021-12-16 PROCEDURE — 63600175 PHARM REV CODE 636 W HCPCS: Performed by: NURSE PRACTITIONER

## 2021-12-16 PROCEDURE — 92523 SPEECH SOUND LANG COMPREHEN: CPT

## 2021-12-16 PROCEDURE — 31720 CLEARANCE OF AIRWAYS: CPT

## 2021-12-16 PROCEDURE — 94668 MNPJ CHEST WALL SBSQ: CPT

## 2021-12-16 PROCEDURE — 84132 ASSAY OF SERUM POTASSIUM: CPT | Mod: 91 | Performed by: PSYCHIATRY & NEUROLOGY

## 2021-12-16 PROCEDURE — 99291 PR CRITICAL CARE, E/M 30-74 MINUTES: ICD-10-PCS | Mod: ,,, | Performed by: PSYCHIATRY & NEUROLOGY

## 2021-12-16 PROCEDURE — 63600175 PHARM REV CODE 636 W HCPCS

## 2021-12-16 PROCEDURE — 25000242 PHARM REV CODE 250 ALT 637 W/ HCPCS: Performed by: PSYCHIATRY & NEUROLOGY

## 2021-12-16 PROCEDURE — 92526 ORAL FUNCTION THERAPY: CPT

## 2021-12-16 RX ORDER — MAGNESIUM SULFATE HEPTAHYDRATE 40 MG/ML
2 INJECTION, SOLUTION INTRAVENOUS
Status: DISCONTINUED | OUTPATIENT
Start: 2021-12-16 | End: 2021-12-31

## 2021-12-16 RX ORDER — POTASSIUM CHLORIDE 14.9 MG/ML
60 INJECTION INTRAVENOUS
Status: DISCONTINUED | OUTPATIENT
Start: 2021-12-16 | End: 2021-12-31

## 2021-12-16 RX ORDER — POTASSIUM CHLORIDE 29.8 MG/ML
40 INJECTION INTRAVENOUS
Status: DISCONTINUED | OUTPATIENT
Start: 2021-12-16 | End: 2021-12-31

## 2021-12-16 RX ORDER — HEPARIN SODIUM 5000 [USP'U]/ML
5000 INJECTION, SOLUTION INTRAVENOUS; SUBCUTANEOUS EVERY 8 HOURS
Status: DISCONTINUED | OUTPATIENT
Start: 2021-12-16 | End: 2021-12-28

## 2021-12-16 RX ORDER — POTASSIUM CHLORIDE 29.8 MG/ML
80 INJECTION INTRAVENOUS
Status: DISCONTINUED | OUTPATIENT
Start: 2021-12-16 | End: 2021-12-31

## 2021-12-16 RX ORDER — MAGNESIUM SULFATE HEPTAHYDRATE 40 MG/ML
4 INJECTION, SOLUTION INTRAVENOUS
Status: DISCONTINUED | OUTPATIENT
Start: 2021-12-16 | End: 2021-12-31

## 2021-12-16 RX ORDER — FUROSEMIDE 10 MG/ML
60 INJECTION INTRAMUSCULAR; INTRAVENOUS ONCE
Status: COMPLETED | OUTPATIENT
Start: 2021-12-16 | End: 2021-12-16

## 2021-12-16 RX ADMIN — IPRATROPIUM BROMIDE AND ALBUTEROL SULFATE 3 ML: 2.5; .5 SOLUTION RESPIRATORY (INHALATION) at 12:12

## 2021-12-16 RX ADMIN — Medication 10 ML: at 01:12

## 2021-12-16 RX ADMIN — ACETYLCYSTEINE 4 ML: 100 INHALANT RESPIRATORY (INHALATION) at 03:12

## 2021-12-16 RX ADMIN — IPRATROPIUM BROMIDE AND ALBUTEROL SULFATE 3 ML: 2.5; .5 SOLUTION RESPIRATORY (INHALATION) at 08:12

## 2021-12-16 RX ADMIN — IPRATROPIUM BROMIDE AND ALBUTEROL SULFATE 3 ML: 2.5; .5 SOLUTION RESPIRATORY (INHALATION) at 03:12

## 2021-12-16 RX ADMIN — IPRATROPIUM BROMIDE AND ALBUTEROL SULFATE 3 ML: 2.5; .5 SOLUTION RESPIRATORY (INHALATION) at 07:12

## 2021-12-16 RX ADMIN — ACETYLCYSTEINE 4 ML: 100 INHALANT RESPIRATORY (INHALATION) at 08:12

## 2021-12-16 RX ADMIN — LABETALOL HYDROCHLORIDE 5 MG: 5 INJECTION, SOLUTION INTRAVENOUS at 12:12

## 2021-12-16 RX ADMIN — MUPIROCIN: 20 OINTMENT TOPICAL at 10:12

## 2021-12-16 RX ADMIN — HEPARIN SODIUM 5000 UNITS: 5000 INJECTION INTRAVENOUS; SUBCUTANEOUS at 09:12

## 2021-12-16 RX ADMIN — Medication 10 ML: at 12:12

## 2021-12-16 RX ADMIN — Medication 10 ML: at 06:12

## 2021-12-16 RX ADMIN — METHYLPREDNISOLONE SODIUM SUCCINATE 20 MG: 40 INJECTION, POWDER, FOR SOLUTION INTRAMUSCULAR; INTRAVENOUS at 08:12

## 2021-12-16 RX ADMIN — LEVETIRACETAM 500 MG: 500 INJECTION, SOLUTION INTRAVENOUS at 08:12

## 2021-12-16 RX ADMIN — HEPARIN SODIUM 5000 UNITS: 5000 INJECTION INTRAVENOUS; SUBCUTANEOUS at 03:12

## 2021-12-16 RX ADMIN — LABETALOL HYDROCHLORIDE 5 MG: 5 INJECTION, SOLUTION INTRAVENOUS at 02:12

## 2021-12-16 RX ADMIN — LEVETIRACETAM 500 MG: 500 INJECTION, SOLUTION INTRAVENOUS at 09:12

## 2021-12-16 RX ADMIN — POTASSIUM CHLORIDE 80 MEQ: 29.8 INJECTION, SOLUTION INTRAVENOUS at 03:12

## 2021-12-16 RX ADMIN — ACETYLCYSTEINE 4 ML: 100 INHALANT RESPIRATORY (INHALATION) at 07:12

## 2021-12-16 RX ADMIN — FUROSEMIDE 60 MG: 10 INJECTION, SOLUTION INTRAMUSCULAR; INTRAVENOUS at 01:12

## 2021-12-16 RX ADMIN — MUPIROCIN: 20 OINTMENT TOPICAL at 09:12

## 2021-12-16 RX ADMIN — POTASSIUM CHLORIDE 40 MEQ: 29.8 INJECTION, SOLUTION INTRAVENOUS at 10:12

## 2021-12-16 NOTE — PLAN OF CARE
Problem: Occupational Therapy Goal  Goal: Occupational Therapy Goal  Description: Goals to be met by: 12/26/2021 (10 days)     Patient will increase functional independence with ADLs by performing:    UE Dressing with Moderate Assistance.  LE Dressing with Maximum Assistance.  Grooming while seated with Minimal Assistance.  Toileting from bedside commode with Minimal Assistance for hygiene and clothing management.   Sitting at edge of bed x10 minutes with Minimal Assistance.  Rolling to Bilateral with Minimal Assistance.   Supine to sit with Minimal Assistance.  Stand pivot transfers with Moderate Assistance.  Step transfer with Moderate Assistance  Toilet transfer to bedside commode with Moderate Assistance.    Evaluated pt and established OT POC. Continue OT as tolerated  Catalina Snyder OT  12/16/2021    Outcome: Ongoing, Progressing

## 2021-12-16 NOTE — PT/OT/SLP EVAL
Occupational Therapy   Co-Evaluation and Co-treat  Co-treatment with PT for maximal pt participation, safety, and activity tolerance     Name: Selma Lux  MRN: [id number]  Admitting Diagnosis:  Acute subdural hematoma  Recent Surgery: * No surgery found *      Recommendations:     Discharge Recommendations: rehabilitation facility  Discharge Equipment Recommendations:  other (see comments) (tbd)  Barriers to discharge:       Assessment:     Selma Lux is a 86 y.o. female with a medical diagnosis of Acute subdural hematoma.  She presents with impaired ADL and mobility performance deficits. Pt found lethargic however agreeable to therapy. Pt oriented to all but year however followed limited commands 2/2 nature of cognitive state. Daughter present and supportive and helped assist with PLOF of Dr. Lux. PTA, pt had daughter residing with her in a H. At home, pt was mobilizing around her home with a SPC and using a RW for community mobility. Pt was requiring A from aide for UB/LB dressing and needed assistance getting out of claw-foot tub otherwise mod (I) for ADLs. During eval today, pt required total A x2 for bed mobility and sat EOB with max-total A.  Pt showed significant posterior lean which was corrected throughout all of sesison needing x2 significant episodes of repositioning pt to prevent sliding. Pt showed limited mobility to LEs and had WFL of RUE only (trace of LUE). Pt was left upright in bed and voiced appreciation. At this time, pt is a high fall risk and is not safe to return home. She is not at her baseline. Pt would benefit from continued OT skilled services 3x/wk to improve daily living skills to optimize QOL.  Pt is recommended to discharge to rehab at this time.    Performance deficits affecting function: weakness,impaired self care skills,impaired balance,decreased coordination,decreased safety awareness,decreased ROM,impaired endurance,impaired functional mobilty,decreased upper extremity  "function,impaired coordination,decreased lower extremity function,impaired cognition,gait instability,impaired fine motor.      Rehab Prognosis: Fair; patient would benefit from acute skilled OT services to address these deficits and reach maximum level of function.       Plan:     Patient to be seen   to address the above listed problems via therapeutic activities,self-care/home management,therapeutic exercises,neuromuscular re-education,sensory integration  · Plan of Care Expires: 01/16/22  · Plan of Care Reviewed with: patient,daughter    Subjective     Chief Complaint: pain 'all over' at EOB     Patient/Family Comments/goals: "She was doing pretty good at home actually. Even getting to a point where she was trying to get out of the tub herself"- pt's daughter    Occupational Profile:  Living Environment: Pt's daughter staying with pt in a H with 5 GLORIA with BL HR's.   Previous level of function: A from aide 3 hrs/wk Mon-Fri for dressing (pt with baseline shoulder deficits. Daughter assists with pt ascending from tub (pt can step over tub side and bath self). Pt mobilizes in home with SPC and mobilizes in community using her RW.   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  Patient found HOB elevated with telemetry,Other (comments),SCD,peripheral IV,restraints,oxygen,blood pressure cuff,pulse ox (continuous),parikh catheter (Flow track) upon OT entry to room.    General Precautions: Standard, fall,aspiration   Orthopedic Precautions:N/A   Braces: " N/A  Respiratory Status: Nasal cannula, flow j L/min    Occupational Performance:    Bed Mobility:    · Patient completed Rolling/Turning to Left with  total assistance and 2 persons  · Patient completed Supine to Sit with total assistance and 2 persons  · Patient completed Sit to Supine with total assistance and 2 persons    Functional Mobility/Transfers:  · Functional Mobility: Pt tolerated EOB ~4 minutes with 1-2 moments of max A however primarily total A with significant posterior lean despite cues for forward posturing.    Activities of Daily Living:  · Upper Body Dressing: total assistance adjusting gown fit at EOB   · Lower Body Dressing: total assistance donning  socks   · Toileting: total assistance using [employer]    Cognitive/Visual Perceptual:  Cognitive/Psychosocial Skills:     -       Oriented to: Person, Place and Situation   -       Follows Commands/attention:Follows one-step commands  -       Communication: dysarthria and primarily nonverbal  -       Memory: No Deficits noted  -       Safety awareness/insight to disability: impaired   -       Mood/Affect/Coping skills/emotional control: Flat affect    Physical Exam:  Balance:    -       demo poor EOB balance with posterior lean  Upper Extremity Range of Motion:  -       Right Upper Extremity: Deficits: limited assessment 2/2 poor participation (fair /digit flexion)  -       Left Upper Extremity: WFL except limited shoulder flex at baseline   Upper Extremity Strength:    -       Right Upper Extremity: Deficits: limited   -       Left Upper Extremity: WFL except shoulder    Strength:    -       Right Upper Extremity: WFL  -       Left Upper Extremity: WFL    AMPAC 6 Click ADL:  AMPAC Total Score: 6    Treatment & Education:  Pt educated on role of occupational therapy, POC, and safety during ADLs and functional mobility. Pt and OT discussed importance of safe, continued mobility to optimize daily living skills. Pt's daughter verbalized  understanding.     White board updated during session. Pt given instruction to call for medical staff/nurse for assistance.     Education:    Patient left HOB elevated with all lines intact, call button in reach, bed alarm on, restraints reapplied at end of session and RN notified    GOALS:   Multidisciplinary Problems     Occupational Therapy Goals        Problem: Occupational Therapy Goal    Goal Priority Disciplines Outcome Interventions   Occupational Therapy Goal     OT, PT/OT Ongoing, Progressing    Description: Goals to be met by: 12/26/2021 (10 days)     Patient will increase functional independence with ADLs by performing:    UE Dressing with Moderate Assistance.  LE Dressing with Maximum Assistance.  Grooming while seated with Minimal Assistance.  Toileting from bedside commode with Minimal Assistance for hygiene and clothing management.   Sitting at edge of bed x10 minutes with Minimal Assistance.  Rolling to Bilateral with Minimal Assistance.   Supine to sit with Minimal Assistance.  Stand pivot transfers with Moderate Assistance.  Step transfer with Moderate Assistance  Toilet transfer to bedside commode with Moderate Assistance.                     History:     History reviewed. No pertinent past medical history.    History reviewed. No pertinent surgical history.    Time Tracking:     OT Date of Treatment: 12/16/21  OT Start Time: 1012  OT Stop Time: 1038  OT Total Time (min): 26 min    Billable Minutes:Evaluation 10 min  Self Care/Home Management 16 min    12/16/2021

## 2021-12-16 NOTE — SUBJECTIVE & OBJECTIVE
Interval history: NAEO. Neuro improved. Still on EEG. copious respiratory secretions yesterday. EEG read pending.               Review of Systems    Objective:     Weight: 67 kg (147 lb 11.3 oz)  Body mass index is 25.35 kg/m².  Vital Signs (Most Recent):  Temp: 98.4 °F (36.9 °C) (12/16/21 0305)  Pulse: 108 (12/16/21 0605)  Resp: (!) 27 (12/16/21 0705)  BP: (!) 144/78 (12/16/21 0605)  SpO2: 100 % (12/16/21 0605) Vital Signs (24h Range):  Temp:  [98 °F (36.7 °C)-98.4 °F (36.9 °C)] 98.4 °F (36.9 °C)  Pulse:  [100-121] 108  Resp:  [16-45] 27  SpO2:  [95 %-100 %] 100 %  BP: (106-159)/(62-82) 144/78  Arterial Line BP: (123-231)/() 138/56     Date 12/16/21 0700 - 12/17/21 0659   Shift 8837-8751 2105-6093 4406-2461 24 Hour Total   INTAKE   IV Piggyback 20.4   20.4   Shift Total(mL/kg) 20.4(0.3)   20.4(0.3)   OUTPUT   Shift Total(mL/kg)       Weight (kg) 67 67 67 67              Oxygen Concentration (%):  [28] 28    Female External Urinary Catheter 12/13/21 1424 (Active)       Neuro exam:    E4V4M6.  AAOx2, slowed mentation  PERRL  CN grossly intact  FC x4 wiggling toes and squeezing hands  On EEG          Significant Labs:  Recent Labs   Lab 12/14/21  1605 12/14/21  1818 12/15/21  0202 12/15/21  0604 12/15/21  1323 12/15/21  1727 12/16/21  0159     --  163*  --   --   --  126*   *   < > 137   < > 137 137 144   K 3.5  --  3.2*  --   --   --  2.9*   CL 99  --  103  --   --   --  103   CO2 19*  --  18*  --   --   --  23   BUN 15  --  19  --   --   --  16   CREATININE 0.7  --  0.7  --   --   --  0.8   CALCIUM 8.0*  --  8.0*  --   --   --  9.4   MG  --   --  1.8  --   --   --  1.8    < > = values in this interval not displayed.     Recent Labs   Lab 12/14/21  1642 12/14/21  1642 12/15/21  0202 12/15/21  0457 12/16/21  0159   WBC 12.35  --  8.81  --  13.39*   HGB 9.7*  --  9.1*  --  10.4*   HCT 29.6*   < > 28.1* 27* 32.2*   *  --  114*  --  152    < > = values in this interval not displayed.     No  results for input(s): LABPT, INR, APTT in the last 48 hours.  Microbiology Results (last 7 days)     ** No results found for the last 168 hours. **        All pertinent labs from the last 24 hours have been reviewed.    Significant Diagnostics:  I have reviewed all pertinent imaging results/findings within the past 24 hours.

## 2021-12-16 NOTE — PT/OT/SLP EVAL
"Physical Therapy Co-Evaluation and Co-Treatment    Patient Name:  Selma Lux   MRN:  [id number]    Recommendations:     Discharge Recommendations:  rehabilitation facility   Discharge Equipment Recommendations:  (TBD)   Barriers to discharge: Increased level of assist and Inaccessible home    Assessment:     Selma Lux is a 86 y.o. female admitted with a medical diagnosis of Acute subdural hematoma.  She presents with the following impairments/functional limitations:  weakness,impaired endurance,impaired self care skills,impaired functional mobilty,gait instability,impaired balance,decreased upper extremity function,decreased lower extremity function,impaired cognition,decreased safety awareness,impaired coordination. These deficits affect their roles and responsibilities in which they were able to complete prior to admit. Selma Lux would benefit from acute PT intervention to improve quality of life and focus on recovery of impairments. Once medically stable, recommending pt discharge to Inpatient Rehabilitation Facility. Patient oriented to all but year and minimally verbal this visit. Patient demonstrates significant posterior lean while sitting edge of bed with need for 2 significant scoots backwards to prevent sliding.    Rehab Prognosis: Fair; patient would benefit from acute skilled PT services 3 x/week to address these deficits and reach maximum level of function. Patient is most appropriate to go to rehabilitation facility.  Recent Surgery: * No surgery found *      Plan:     During this hospitalization, patient to be seen 3 x/week to address the identified rehab impairments via gait training,therapeutic activities,therapeutic exercises,neuromuscular re-education and progress toward the following goals:    · Plan of Care Expires:  01/16/22    Subjective     Chief Complaint: Pain "all over"  Patient/Family Comments/Goals: "We have a caregiver who comes in the morning to make breakfast. She's there for 3 " "hours"  Pain/Comfort:  · Pain Rating 1:  (reported pain "all over")  · Pain Addressed 1: Reposition,Distraction,Cessation of Activity  · Pain Rating Post-Intervention 1:  (not rated)    Patients cultural, spiritual, Scientology conflicts given the current situation: no    Living Environment:  Patient lives with their daughter in a single story home, number of outside stairs: 5v with B HR, tub only.  Prior to admission, patient required assistance with ADLs including getting out of tub and dressing. Patient has a caregiver that comes for 3 hours Monday-Friday. Her daughter reports she is almost never home alone. Patient utilizes SPC in the house and a RW in the community. Patient uses DME as follows: walker, rolling,bedside commode,cane, straight (adjustable bed). DME owned (not currently used): none. Upon discharge, patient will have assistance from family.    Objective:     Communicated with nursing prior to session.  Patient found HOB elevated with telemetry,SCD,peripheral IV,restraints,oxygen,blood pressure cuff,pulse ox (continuous),parikh catheter,Other (comments) (flow trac) upon PT entry to room.    General Precautions: Standard, aspiration,fall   Orthopedic Precautions:N/A   Braces: N/A   Oxygen Device:      Exams:  · Cognitive Exam:  Patient is oriented to Person, Place, Time, Situation, not oriented to year, follows simple commands  · RLE ROM: WFL  · RLE Strength: 2/5 hip flexion and knee extension, 0/5 dorsiflexion  · LLE ROM: WFL  · LLE Strength: 2/5 hip flexion and knee extension, 0/5 dorsiflexion    Functional Mobility:  · Bed Mobility:     · Rolling Left:  total assistance of 2 persons  · Scooting: total assistance of 2 persons  · Supine to Sit: total assistance of 2 persons  · Sit to Supine: total assistance of 2 persons  · Transfers:  Deferred due to poor sitting balance  · Balance:   · Static Sitting: Poor, able to maintain for 4 minute(s) with maximal assistance total assistance, 1 moment of maximal " assist with max cuing, patient demonstrates significant posterior lean  · Dynamic Sitting: Poor: Patient unable to accept challenge or move without loss of balance, total assistance     Therapeutic Activities and Exercises:  Patient educated on role of acute care PT and PT POC  Whiteboard updated    AM-PAC 6 CLICK MOBILITY  Total Score:6     Patient left HOB elevated with all lines intact, call button in reach, RN notified, bed alarm on, family present and restraints reapplied at end of session.    GOALS:   Multidisciplinary Problems     Physical Therapy Goals        Problem: Physical Therapy Goal    Goal Priority Disciplines Outcome Goal Variances Interventions   Physical Therapy Goal     PT, PT/OT Ongoing, Progressing     Description: Goals to be met by: 2021     Patient will increase functional independence with mobility by performin. Supine to sit with moderate assistance  2. Sit to supine with moderate assistance  3. Sit to stand transfer with maximal assistance  4. Bed to chair transfer with maximal assistance using LRAD as needed  5. Gait  x 5 feet with maximal assistance using LRAD as needed  6. Ascend/descend 5 stair with bilateral handrails maximal assistance using LRAD as needed  7. Lower extremity exercise program x10 reps per handout, with independence                      History:     History reviewed. No pertinent past medical history.    History reviewed. No pertinent surgical history.    Time Tracking:     PT Received On: 21  PT Start Time: 1012     PT Stop Time: 1038  PT Total Time (min): 26 min     Billable Minutes: Evaluation 18 min Neuromuscular Re-education 8 min    2021    Co-evaluation and co-treatment performed for this visit due to suspected patient need for two skilled therapists to ensure patient and staff safety and to accommodate for patient activity tolerance/pain management

## 2021-12-16 NOTE — PROGRESS NOTES
[facility]  Neurosurgery  Progress Note    Subjective:     History of Present Illness: 86-year-old female with past medical history of TIA comes to the emergency department for AMS.  She was found on the floor about 1 hour ago only responding to painful stimuli.  Patient occasionally says her name.GCS 12 noted for EMS.  Patient does take blood thinners. She had a mechanical fall out of bed on Thursday hitting her head and has been complaining of intermittent headaches since. Her last dose of eliquis was on 12/11. NSGY consulted for CTH with SDH      Post-Op Info:  * No surgery found *         Interval history: NAEO. Neuro improved. Still on EEG. copious respiratory secretions yesterday. EEG read pending.               Review of Systems    Objective:     Weight: 67 kg (147 lb 11.3 oz)  Body mass index is 25.35 kg/m².  Vital Signs (Most Recent):  Temp: 98.4 °F (36.9 °C) (12/16/21 0305)  Pulse: 108 (12/16/21 0605)  Resp: (!) 27 (12/16/21 0705)  BP: (!) 144/78 (12/16/21 0605)  SpO2: 100 % (12/16/21 0605) Vital Signs (24h Range):  Temp:  [98 °F (36.7 °C)-98.4 °F (36.9 °C)] 98.4 °F (36.9 °C)  Pulse:  [100-121] 108  Resp:  [16-45] 27  SpO2:  [95 %-100 %] 100 %  BP: (106-159)/(62-82) 144/78  Arterial Line BP: (123-231)/() 138/56     Date 12/16/21 0700 - 12/17/21 0659   Shift 3661-1965 6054-0151 5549-9355 24 Hour Total   INTAKE   IV Piggyback 20.4   20.4   Shift Total(mL/kg) 20.4(0.3)   20.4(0.3)   OUTPUT   Shift Total(mL/kg)       Weight (kg) 67 67 67 67              Oxygen Concentration (%):  [28] 28    Female External Urinary Catheter 12/13/21 1424 (Active)       Neuro exam:    E4V4M6.  AAOx2, slowed mentation  PERRL  CN grossly intact  FC x4 wiggling toes and squeezing hands  On EEG          Significant Labs:  Recent Labs   Lab 12/14/21  1605 12/14/21  1818 12/15/21  0202 12/15/21  0604 12/15/21  1323 12/15/21  1727 12/16/21  0159     --  163*  --   --   --  126*   *   < > 137   < >  137 137 144   K 3.5  --  3.2*  --   --   --  2.9*   CL 99  --  103  --   --   --  103   CO2 19*  --  18*  --   --   --  23   BUN 15  --  19  --   --   --  16   CREATININE 0.7  --  0.7  --   --   --  0.8   CALCIUM 8.0*  --  8.0*  --   --   --  9.4   MG  --   --  1.8  --   --   --  1.8    < > = values in this interval not displayed.     Recent Labs   Lab 12/14/21  1642 12/14/21  1642 12/15/21  0202 12/15/21  0457 12/16/21  0159   WBC 12.35  --  8.81  --  13.39*   HGB 9.7*  --  9.1*  --  10.4*   HCT 29.6*   < > 28.1* 27* 32.2*   *  --  114*  --  152    < > = values in this interval not displayed.     No results for input(s): LABPT, INR, APTT in the last 48 hours.  Microbiology Results (last 7 days)     ** No results found for the last 168 hours. **        All pertinent labs from the last 24 hours have been reviewed.    Significant Diagnostics:  I have reviewed all pertinent imaging results/findings within the past 24 hours.    Assessment/Plan:     * Acute subdural hematoma  86F with on eliquis s/p head trauma 1 week prior now with small aSDH and AMS.     No acute events. Interval imaprovement in exam. E4V4M6.      --admitted to [facility]  --CTH: R temp/paretial aSDH 5-6mm thickness, 3-5mm MLS, stable on repeat  --holding eliquis  --EEG pending, f/u   --keppra, AED per NCC  --SBP<150, HOB > 30  --Na >135, greatly improved, now 144 off hypertonics.   --ok for SQH  --no acute neurosurgical intervention at this time  --rest of care per NCC  --Please page NSGY immediately for any changes in neuro status        Rebeca Avila MD  Neurosurgery  [facility] - Neuro Critical Care

## 2021-12-16 NOTE — PLAN OF CARE
PT eval complete, plan of care established    2021    Problem: Physical Therapy Goal  Goal: Physical Therapy Goal  Description: Goals to be met by: 2021     Patient will increase functional independence with mobility by performin. Supine to sit with moderate assistance  2. Sit to supine with moderate assistance  3. Sit to stand transfer with maximal assistance  4. Bed to chair transfer with maximal assistance using LRAD as needed  5. Gait  x 5 feet with maximal assistance using LRAD as needed  6. Ascend/descend 5 stair with bilateral handrails maximal assistance using LRAD as needed  7. Lower extremity exercise program x10 reps per handout, with independence     Outcome: Ongoing, Progressing

## 2021-12-16 NOTE — PLAN OF CARE
[facility] Care Plan    POC reviewed with Selma Lux and family at 0300. Pt unable to verbalize understanding. Questions and concerns addressed. No acute events overnight. Pt progressing toward goals. Will continue to monitor. See below and flowsheets for full assessment and VS info. Consistently oriented x4 tonight. Off Cardene.      Neuro:  [place] Coma Scale  Best Eye Response: 3-->(E3) to speech  Best Motor Response: 5-->(M5) localizes pain  Best Verbal Response: 2-->(V2) incomprehensible speech  [place] Coma Scale Score: 10  Assessment Qualifiers: patient not sedated/intubated  Pupil PERRLA: yes     24hr Temp:  [98 °F (36.7 °C)-98.5 °F (36.9 °C)]     CV:   Rhythm: sinus tachycardia  BP goals:   SBP < 160  MAP > 65    Resp:   O2 Device (Oxygen Therapy): nasal cannula w/ humidification  Oxygen Concentration (%): 28    Plan: N/A    GI/:  SCOOBY Total Score: 0  Diet/Nutrition Received: NPO  Last Bowel Movement:  (Unknown)  Voiding Characteristics: urethral catheter (bladder)    Intake/Output Summary (Last 24 hours) at 12/16/2021 0644  Last data filed at 12/16/2021 0616  Gross per 24 hour   Intake 310.31 ml   Output 4255 ml   Net -3944.69 ml     Unmeasured Output  Urine Occurrence: 1  Stool Occurrence: 0  Pad Count: 1    Labs/Accuchecks:  Recent Labs   Lab 12/16/21  0159   WBC 13.39*   RBC 3.65*   HGB 10.4*   HCT 32.2*         Recent Labs   Lab 12/16/21  0159      K 2.9*   CO2 23      BUN 16   CREATININE 0.8   ALKPHOS 50*   ALT 13   AST 12   BILITOT 1.8*      Recent Labs   Lab 12/13/21  1121   INR 1.0   APTT 28.6      Recent Labs   Lab 12/13/21  1055          Electrolytes: Electrolytes replaced  Accuchecks: none    Gtts:   dexmedetomidine (PRECEDEX) infusion Stopped (12/15/21 1114)    niCARdipine Stopped (12/15/21 1015)       LDA/Wounds:  Lines/Drains/Airways       Peripherally Inserted Central Catheter Line              PICC Double Lumen 12/15/21 1616 right basilic <1 day              Drain                    Urethral Catheter 12/14/21 1031 1 day              Airway                   Nasopharyngeal Airway 2 days              Arterial Line              Arterial Line 12/13/21 1632 Right Radial 2 days              Peripheral Intravenous Line                   Peripheral IV - Single Lumen 12/13/21 1054 20 G Left Antecubital 2 days         Peripheral IV - Single Lumen 12/13/21 1453 22 G Anterior;Left Wrist 2 days                  Wounds: no  Wound care consulted: No

## 2021-12-16 NOTE — PT/OT/SLP EVAL
"Speech Language Pathology Evaluation  Cognitive Communication    Patient Name:  Selma Lux   MRN:  [id number]  Admitting Diagnosis: Acute subdural hematoma    Recommendations:     Recommendations:                General Recommendations:  Dysphagia therapy, Speech/language therapy and Cognitive-linguistic therapy  Diet recommendations:  NPO, NPO   Aspiration Precautions: Frequent oral care and Strict aspiration precautions   General Precautions: Standard, aspiration,fall,respiratory  Communication strategies:  provide increased time to answer and go to room if call light pushed    History:     History reviewed. No pertinent past medical history.    History reviewed. No pertinent surgical history.    Social History: Patient lives with her daugther.  Daughter reports functional language/cogntiion at baseline.  H/o dysphagia requiring peg tube several years ago though on a  regular diet with thin liquids pta.      Prior Intubation HX:  None this admission     Modified Barium Swallow: none documented    Chest X-Rays: 12/15: Interval placement of right-sided PICC line with tip overlying the cavoatrial junction region.  Interval removal of previous right IJ CVC.  Cardiomediastinal silhouette is midline and similar to prior.  Slight improved aeration of the left lung base.  No pneumothorax or new focal opacity.  Otherwise grossly stable chest.      Subjective     "What kind of ice is available?"     Pt seen bedside with daughter present.  RN cleared for session.       Pain/Comfort:  · Pain Rating 1: 0/10  · Pain Rating Post-Intervention 1: 0/10    Respiratory Status: Room air    Objective:   Cognitive Status:    Orientation Oriented x4  [employer] 66%, NR x1   Cont to assess 2/2 decreased alertness     Receptive Language:   Comprehension:   mildly impaired  Questions Complex yes/no 50%  Commands  One step fluctuating accy likely 2/2 decreased alertness    Expressive Language:  Verbal:    cont to assess 2/2 " limited verbalizations though occasional sentence length utterance elicited.  Confrontation naming completed with 100% accy.  Responsive naming completed wiht 75% accy.       Motor Speech:  Dysarthria mild imprecision though decreased intelligibility 2/2 dysphonia    Voice:   Quality Hoarse, Wet and Breathy  Intensity decreased    Visual-Spatial:  tba    Reading:   tba     Written Expression:   tba    Treatment: Pt repositioned with help of nursing and HOB raised upright for safety.  Oral care [provided.  Pt presented with ice chip x4 and 1/2 tsp of puree x3.  Delayed oral transit with delayed initiation of swallow up to 20 seconds noted.  Throat clear/weak cough elicited on 3/4 ice chips and 2/3 tsps of puree.  Multiple swallows elicited across all trials.  Education provided to pt and daughter re: cont npo, aspiration risk, s/s aspiration, and ongoing SLP POC.  Daughter verbalizing understanding,.     Assessment:   Selma Lux is a 86 y.o. female with an SLP diagnosis of Dysphagia, Dysarthria, Cognitive-Linguistic Impairment and Dysphonia.  She presents with decreased sustained alertness.     Goals:   Multidisciplinary Problems     SLP Goals        Problem: SLP Goal    Goal Priority Disciplines Outcome   SLP Goal     SLP Ongoing, Progressing   Description: Speech Language Pathology Goals  Goals expected to be met by 12/30:  1. Pt will participate in ongoing swallowing assessment.   2. Pt will maintain alertness for 5 min intervals IND.   3. Pt will complete complex comprehension tasks with 90% accy given min cues.   4. Pt will implement simple speech strategies at phrase level with 80% intelligibility given min cues.   5. Pt will participate in ongoing assessment of language/cognition as appropriate.                       Plan:   · Patient to be seen:  5 x/week   · Plan of Care expires:  01/14/21  · Plan of Care reviewed with:  patient,daughter   · SLP Follow-Up:  Yes       Discharge recommendations:  Discharge  Facility/Level of Care Needs: rehabilitation facility   Barriers to Discharge:  Level of Skilled Assistance Needed      Time Tracking:   SLP Treatment Date:   12/16/21  Speech Start Time:  0745  Speech Stop Time:  0811     Speech Total Time (min):  26 min    Billable Minutes: Eval 8 , Treatment Swallowing Dysfunction 10 and Self Care/Home Management Training 8    12/16/2021

## 2021-12-16 NOTE — PROGRESS NOTES
[facility] - Neuro Critical Care  Neurocritical Care  Progress Note    Admit Date: 12/13/2021  Service Date: 12/16/2021  Length of Stay: 3    Subjective:     Chief Complaint: Acute subdural hematoma    History of Present Illness:   The patient is an 86-year-old female with past medical history of TIA, RA and Afib of Eliquis admitted to [facility] with Right SDH.  She was found on the floor this morning only responding to painful stimuli.  Patient occasionally says her name.GCS 12 noted for EMS.  PCC administered. She had a mechanical fall out of bed on Thursday hitting her head and has been complaining of intermittent headaches since. Her last dose of eliquis was on 12/11. Pending repeat CT head. NSGY consulted. In addition patient was found to be hyponatremic at 118-central line placed and hypertonic saline infusion initiated. Pending repeat CT head. Patient admitted to [facility] for close monitoring and higher level of care.       Hospital Course: 12/13/2021: patient admitted to [facility] s/p fall on 12/11 on Eliquis  12/14/2021 wheezing, prolonged expiratory phase.   12/15/2021 copious respiratory secretions. Remains encephalopathic. Review EEG  12/16/2021 improved respiratory secretions. D/c nasal tracheal airway if minimal secretions.       Review of Symptoms: encephalopathic cannot participate    Medications:  Continuous Infusions:   dexmedetomidine (PRECEDEX) infusion Stopped (12/15/21 1114)    niCARdipine Stopped (12/15/21 1015)     Scheduled Meds:   acetylcysteine 100 mg/ml (10%)  4 mL Nebulization TID WAKE    albuterol-ipratropium  3 mL Nebulization Q4H    amLODIPine  10 mg Oral Daily    atorvastatin  40 mg Oral Daily    baclofen  10 mg Oral TID    hydrOXYchloroQUINE  200 mg Oral BID    levetiracetam IV  500 mg Intravenous Q12H    methylPREDNISolone sodium succinate  20 mg Intravenous Daily    montelukast  10 mg Oral Daily    mupirocin   Nasal BID    mycophenolate  500 mg Oral BID    senna-docusate 8.6-50 mg  1  tablet Oral Daily    sodium chloride 0.9%  10 mL Intravenous Q6H     PRN Meds:.acetaminophen, albuterol-ipratropium, calcium gluconate IVPB, calcium gluconate IVPB, calcium gluconate IVPB, labetalol, magnesium sulfate IVPB, magnesium sulfate IVPB, ondansetron, potassium chloride in water **AND** potassium chloride in water **AND** potassium chloride in water, Flushing PICC Protocol **AND** sodium chloride 0.9% **AND** sodium chloride 0.9%    OBJECTIVE:   Vital Signs (Most Recent):   Temp: 98.5 °F (36.9 °C) (12/16/21 0705)  Pulse: (!) 113 (12/16/21 0900)  Resp: (!) 23 (12/16/21 0900)  BP: (!) 136/110 (12/16/21 0900)  SpO2: 100 % (12/16/21 0900)    Vital Signs (24h Range):   Temp:  [98 °F (36.7 °C)-98.5 °F (36.9 °C)] 98.5 °F (36.9 °C)  Pulse:  [100-121] 113  Resp:  [16-45] 23  SpO2:  [95 %-100 %] 100 %  BP: (106-159)/() 136/110  Arterial Line BP: (123-231)/() 151/59    ICP/CPP (Last 24h):   CO:  [3.9 L/min-4.2 L/min] 4.2 L/min  CI:  [2.3 L/min/m2-2.4 L/min/m2] 2.4 L/min/m2    I & O (Last 24h):     Intake/Output Summary (Last 24 hours) at 12/16/2021 0956  Last data filed at 12/16/2021 0900  Gross per 24 hour   Intake 235.85 ml   Output 3435 ml   Net -3199.15 ml     Physical Exam:   GA: awake, comfortable, no acute distress.   HEENT: No scleral icterus or JVD. Neck supple  Pulmonary: Air entry equal to auscultation A/P/L. Wheezing +, crackles +  Cardiac: RRR, S1 & S2 w/o rubs/murmurs/gallops.   Abdominal: soft, non-tender, bowel sounds present x 4. No appreciable hepatosplenomegaly.  Skin: No jaundice, rashes, or visible lesions.  Neuro:  --Mental Status:  awake, follows one step commands. Spontaneous eye opening. Tracks visually (particularly to family at bedside).   --Pupils 3.5 mm, PERRL.   --Corneal reflex not done in this arousable patient, gag, cough intact.  Moves bilateral ue/le symmetrically  MSK:  No edema in UE and LE    Vent Data:   Oxygen Concentration (%):  [28] 28    Lines/Drains/Airway:         Nasopharyngeal Airway (Active)   Measured At Nare 12/16/21 0836   Secured Location Left 12/16/21 0836   Site Condition Cool;Dry 12/16/21 0836      PICC Double Lumen 12/15/21 1616 right basilic (Active)   Verification by X-ray Yes 12/16/21 0705   Site Assessment No drainage;No redness;No swelling;No warmth 12/16/21 0705   Extremity Assessment Distal to IV No abnormal discoloration;No redness;No swelling;No warmth 12/16/21 0705   Line Securement Device Secured with sutureless device 12/16/21 0705   Dressing Type Biopatch in place;Central line dressing 12/16/21 0705   Dressing Status Clean;Dry;Intact 12/16/21 0705   Dressing Intervention Integrity maintained 12/16/21 0705   Date on Dressing 12/15/21 12/16/21 0705   Dressing Due to be Changed 12/22/21 12/16/21 0705   Left Lumen Patency/Care Infusing 12/16/21 0705   Right Lumen Patency/Care Flushed w/o difficulty;Normal saline locked 12/16/21 0705   Current Insertion Depth (cm) 33 cm 12/15/21 1616   Current Exposed Catheter (cm) 0 cm 12/15/21 1616   Extremity Circumference (cm) 33 cm 12/15/21 1616   Waveform Normal 12/15/21 1616   Line Necessity Review Incompatible infusions;Medication caustic to vasculature;Poor venous access 12/16/21 0705      Arterial Line 12/13/21 1632 Right Radial (Active)   Site Assessment Clean;Dry;Intact;No redness;No swelling 12/16/21 0705   Line Status Pulsatile blood flow 12/16/21 0705   Art Line Waveform Appropriate;Square wave test performed 12/16/21 0705   Arterial Line Interventions Zeroed and calibrated;Leveled;Connections checked and tightened 12/16/21 0705   Color/Movement/Sensation Capillary refill less than 3 sec 12/16/21 0705   Dressing Type Biopatch in place;Central line dressing 12/16/21 0705   Dressing Status Clean;Dry;Intact 12/16/21 0705   Dressing Intervention Integrity maintained 12/16/21 0705   Dressing Change Due 12/20/21 12/16/21 0705   Tubing Change Due 12/17/21 12/16/21 0705           Urethral Catheter 12/14/21 1031  "(Active)   Site Assessment Clean;Intact 12/16/21 0705   Collection Container Urimeter 12/16/21 0705   Securement Method secured to top of thigh w/ adhesive device 12/16/21 0705   Catheter Care Performed yes 12/16/21 0705   Reason for Continuing Urinary Catheterization Critically ill in ICU and requiring hourly monitoring of intake/output 12/16/21 0705   CAUTI Prevention Bundle StatLock in place w 1" slack;Intact seal between catheter & drainage tubing;Drainage bag/urimeter off the floor;Sheeting clip in use;No dependent loops or kinks;Drainage bag/urimeter not overfilled (<2/3 full);Drainage bag/urimeter below bladder 12/16/21 0705   Output (mL) 30 mL 12/16/21 0900         Nutrition/Tube Feeds (if NPO state why): npo, aspiration precautions     Labs:  ABG:   No results for input(s): PH, PO2, PCO2, HCO3, POCSATURATED, BE in the last 24 hours.  BMP:  Recent Labs   Lab 12/16/21 0159 12/16/21 0159 12/16/21 0915     --   --    K 2.9*   < > 3.5     --   --    CO2 23  --   --    BUN 16  --   --    CREATININE 0.8  --   --    *  --   --    MG 1.8  --   --    PHOS 3.0  --   --     < > = values in this interval not displayed.     LFT:   Lab Results   Component Value Date    AST 12 12/16/2021    ALT 13 12/16/2021    ALKPHOS 50 (L) 12/16/2021    BILITOT 1.8 (H) 12/16/2021    ALBUMIN 4.1 12/16/2021    PROT 6.2 12/16/2021     CBC:   Lab Results   Component Value Date    WBC 13.39 (H) 12/16/2021    HGB 10.4 (L) 12/16/2021    HCT 32.2 (L) 12/16/2021    MCV 88 12/16/2021     12/16/2021     Microbiology x 7d:   Microbiology Results (last 7 days)     ** No results found for the last 168 hours. **        Imaging:  CXR 12/14 - interstitial edema. Lines in place  I personally reviewed the above image.  Today I independently reviewed pertinent medications, lines/drains/airways, imaging, cardiology results, laboratory results, microbiology results, notably:   ASSESSMENT/PLAN:     Active Hospital Problems    " Diagnosis    *Acute subdural hematoma    Stroke    Hyponatremia    Age-related physical debility    Leukocytosis    RA (rheumatoid arthritis)      Neuro:   SDH with brain compression  keppra for seizure prophylaxis  Acute encephalopathy likely multifactorial  Review EEG with epilepsy team    Pulmonary:   Solumedrol (conversion from home regimen of prednisone)  Duonebs/3% nacl/mucomyst  Consider repeating lasix today pm if BMP at noon is stable  Trial of cough assist to mobilize secretions.     Cardiac:   flotrac for advanced hemodynamic monitoiring  Intubation watch    Renal:    Off hypertonic saline. Na > 135  Possibly hypervolemic hypernatremia  Consider repeating lasix today pm if BMP at noon is stable  BMP [id number]    ID:   Afebrile, normal wbc  Will monitor    Hem/Onc:   Transfuse for Hb <7 or 8 if associated with hypotension  plt count slight down trend but stable    Endocrine:    BS stable    Fluids/Electrolytes/Nutrition/GI:   Replete lytes as needed  Lines:  Art +  CVC +  ETT NA  Hill + strict I/O  NG NA  PEG NA    Proph:  DVT:SCD/heparin  Constipation:  Last output:bowel regimen as needed  PUP: NA  VAP: NA    Family (daughters) updated at bedside    Uninterrupted Critical Care/Counseling Time (not including procedures):: 31 mins    Juan Irwin MD, FNCS  Neurocritical care attending    Full Code    Juan Irwin MD  Neurocritical Care  [facility] - Neuro Critical Care

## 2021-12-16 NOTE — PLAN OF CARE
12/16/21 1437   Post-Acute Status   Post-Acute Authorization Placement   Post-Acute Placement Status Referrals Sent  (Mini)     SW observed therapy recommendations for rehab. Per CM, Pt daughter reported they want [facility] if that was recommended as Pt was there before and did really well.    SW sent referral via [facility].    Tiffany Stinson LCSW  Neurocritical Care   [facility]  [id number]

## 2021-12-16 NOTE — PLAN OF CARE
Problem: SLP Goal  Goal: SLP Goal  Description: Speech Language Pathology Goals  Goals expected to be met by 12/30:  1. Pt will participate in ongoing swallowing assessment.   2. Pt will maintain alertness for 5 min intervals IND.   3. Pt will complete complex comprehension tasks with 90% accy given min cues.   4. Pt will implement simple speech strategies at phrase level with 80% intelligibility given min cues.   5. Pt will participate in ongoing assessment of language/cognition as appropriate.      Outcome: Ongoing, Progressing    Rec cont npo with strict aspiration precautions.

## 2021-12-16 NOTE — ASSESSMENT & PLAN NOTE
86F with on eliquis s/p head trauma 1 week prior now with small aSDH and AMS.     No acute events. Interval imaprovement in exam. [id number].      --admitted to NCC  --CTH: R temp/paretial aSDH 5-6mm thickness, 3-5mm MLS, stable on repeat  --holding eliquis  --EEG pending, f/u   --keppra, AED per NCC  --SBP<150, HOB > 30  --Na >135, greatly improved, now 144 off hypertonics.   --ok for SQH  --no acute neurosurgical intervention at this time  --rest of care per NCC  --Please page NSGY immediately for any changes in neuro status

## 2021-12-16 NOTE — PLAN OF CARE
[facility] - Neuro Critical Care  Discharge Reassessment    Primary Care Provider: Bhargav Hirsch MD    Expected Discharge Date: 12/22/2021     Patient NPO per SLP.  Not medically ready for discharge.      Reassessment (most recent)     Discharge Reassessment - 12/16/21 1622        Discharge Reassessment    Assessment Type Discharge Planning Reassessment     Did the patient's condition or plan change since previous assessment? No     Communicated LETICIA with patient/caregiver Date not available/Unable to determine     Discharge Plan A Rehab     Discharge Plan B Home with family;Home Health     DME Needed Upon Discharge  none     Discharge Barriers Identified None     Why the patient remains in the hospital Requires continued medical care               Charlette Benitez RN, CCRN-K, [facility]  Neuro-Critical Care   X [phone]

## 2021-12-17 LAB
ACANTHOCYTES BLD QL SMEAR: PRESENT
ALBUMIN SERPL BCP-MCNC: 4.2 G/DL (ref 3.5–5.2)
ALLENS TEST: ABNORMAL
ALP SERPL-CCNC: 53 U/L (ref 55–135)
ALT SERPL W/O P-5'-P-CCNC: 14 U/L (ref 10–44)
ANION GAP SERPL CALC-SCNC: 17 MMOL/L (ref 8–16)
ANISOCYTOSIS BLD QL SMEAR: SLIGHT
AST SERPL-CCNC: 12 U/L (ref 10–40)
BASOPHILS # BLD AUTO: 0.01 K/UL (ref 0–0.2)
BASOPHILS NFR BLD: 0.1 % (ref 0–1.9)
BILIRUB SERPL-MCNC: 2 MG/DL (ref 0.1–1)
BUN SERPL-MCNC: 30 MG/DL (ref 8–23)
BURR CELLS BLD QL SMEAR: ABNORMAL
CALCIUM SERPL-MCNC: 10.1 MG/DL (ref 8.7–10.5)
CHLORIDE SERPL-SCNC: 107 MMOL/L (ref 95–110)
CO2 SERPL-SCNC: 24 MMOL/L (ref 23–29)
CREAT SERPL-MCNC: 0.9 MG/DL (ref 0.5–1.4)
DELSYS: ABNORMAL
DIFFERENTIAL METHOD: ABNORMAL
EOSINOPHIL # BLD AUTO: 0 K/UL (ref 0–0.5)
EOSINOPHIL NFR BLD: 0.1 % (ref 0–8)
ERYTHROCYTE [DISTWIDTH] IN BLOOD BY AUTOMATED COUNT: 16 % (ref 11.5–14.5)
EST. GFR  (AFRICAN AMERICAN): >60 ML/MIN/1.73 M^2
EST. GFR  (NON AFRICAN AMERICAN): 58.9 ML/MIN/1.73 M^2
FIO2: 28
FLOW: 2
GLUCOSE SERPL-MCNC: 115 MG/DL (ref 70–110)
HCO3 UR-SCNC: 33 MMOL/L (ref 24–28)
HCT VFR BLD AUTO: 36.9 % (ref 37–48.5)
HCT VFR BLD CALC: 36 %PCV (ref 36–54)
HGB BLD-MCNC: 11.7 G/DL (ref 12–16)
HYPOCHROMIA BLD QL SMEAR: ABNORMAL
IMM GRANULOCYTES # BLD AUTO: 0.1 K/UL (ref 0–0.04)
IMM GRANULOCYTES NFR BLD AUTO: 0.7 % (ref 0–0.5)
LYMPHOCYTES # BLD AUTO: 1.1 K/UL (ref 1–4.8)
LYMPHOCYTES NFR BLD: 7.2 % (ref 18–48)
MAGNESIUM SERPL-MCNC: 2.2 MG/DL (ref 1.6–2.6)
MCH RBC QN AUTO: 28.5 PG (ref 27–31)
MCHC RBC AUTO-ENTMCNC: 31.7 G/DL (ref 32–36)
MCV RBC AUTO: 90 FL (ref 82–98)
MODE: ABNORMAL
MONOCYTES # BLD AUTO: 2.5 K/UL (ref 0.3–1)
MONOCYTES NFR BLD: 17.4 % (ref 4–15)
NEUTROPHILS # BLD AUTO: 10.9 K/UL (ref 1.8–7.7)
NEUTROPHILS NFR BLD: 74.5 % (ref 38–73)
NRBC BLD-RTO: 0 /100 WBC
OVALOCYTES BLD QL SMEAR: ABNORMAL
PCO2 BLDA: 43.2 MMHG (ref 35–45)
PH SMN: 7.49 [PH] (ref 7.35–7.45)
PHOSPHATE SERPL-MCNC: 3.4 MG/DL (ref 2.7–4.5)
PLATELET # BLD AUTO: 153 K/UL (ref 150–450)
PLATELET BLD QL SMEAR: ABNORMAL
PMV BLD AUTO: 9.7 FL (ref 9.2–12.9)
PO2 BLDA: 133 MMHG (ref 80–100)
POC BE: 10 MMOL/L
POC IONIZED CALCIUM: 1.24 MMOL/L (ref 1.06–1.42)
POC SATURATED O2: 99 % (ref 95–100)
POC TCO2: 34 MMOL/L (ref 23–27)
POCT GLUCOSE: 117 MG/DL (ref 70–110)
POIKILOCYTOSIS BLD QL SMEAR: ABNORMAL
POLYCHROMASIA BLD QL SMEAR: ABNORMAL
POTASSIUM BLD-SCNC: 3.8 MMOL/L (ref 3.5–5.1)
POTASSIUM SERPL-SCNC: 3.9 MMOL/L (ref 3.5–5.1)
PROT SERPL-MCNC: 6.5 G/DL (ref 6–8.4)
RBC # BLD AUTO: 4.11 M/UL (ref 4–5.4)
SAMPLE: ABNORMAL
SCHISTOCYTES BLD QL SMEAR: ABNORMAL
SCHISTOCYTES BLD QL SMEAR: PRESENT
SITE: ABNORMAL
SODIUM BLD-SCNC: 146 MMOL/L (ref 136–145)
SODIUM SERPL-SCNC: 148 MMOL/L (ref 136–145)
SP02: 100
WBC # BLD AUTO: 14.55 K/UL (ref 3.9–12.7)

## 2021-12-17 PROCEDURE — 99291 PR CRITICAL CARE, E/M 30-74 MINUTES: ICD-10-PCS | Mod: ,,, | Performed by: PSYCHIATRY & NEUROLOGY

## 2021-12-17 PROCEDURE — 63600175 PHARM REV CODE 636 W HCPCS: Performed by: NURSE PRACTITIONER

## 2021-12-17 PROCEDURE — 94668 MNPJ CHEST WALL SBSQ: CPT

## 2021-12-17 PROCEDURE — 92507 TX SP LANG VOICE COMM INDIV: CPT

## 2021-12-17 PROCEDURE — 97112 NEUROMUSCULAR REEDUCATION: CPT

## 2021-12-17 PROCEDURE — 99291 CRITICAL CARE FIRST HOUR: CPT | Mod: ,,, | Performed by: PSYCHIATRY & NEUROLOGY

## 2021-12-17 PROCEDURE — 84100 ASSAY OF PHOSPHORUS: CPT

## 2021-12-17 PROCEDURE — 31720 CLEARANCE OF AIRWAYS: CPT

## 2021-12-17 PROCEDURE — 99900035 HC TECH TIME PER 15 MIN (STAT)

## 2021-12-17 PROCEDURE — 94761 N-INVAS EAR/PLS OXIMETRY MLT: CPT

## 2021-12-17 PROCEDURE — 37799 UNLISTED PX VASCULAR SURGERY: CPT

## 2021-12-17 PROCEDURE — 27000221 HC OXYGEN, UP TO 24 HOURS

## 2021-12-17 PROCEDURE — 94640 AIRWAY INHALATION TREATMENT: CPT

## 2021-12-17 PROCEDURE — 92526 ORAL FUNCTION THERAPY: CPT

## 2021-12-17 PROCEDURE — 85025 COMPLETE CBC W/AUTO DIFF WBC: CPT

## 2021-12-17 PROCEDURE — 63600175 PHARM REV CODE 636 W HCPCS: Performed by: STUDENT IN AN ORGANIZED HEALTH CARE EDUCATION/TRAINING PROGRAM

## 2021-12-17 PROCEDURE — 84295 ASSAY OF SERUM SODIUM: CPT

## 2021-12-17 PROCEDURE — 99233 PR SUBSEQUENT HOSPITAL CARE,LEVL III: ICD-10-PCS | Mod: ,,, | Performed by: NEUROLOGICAL SURGERY

## 2021-12-17 PROCEDURE — 25000003 PHARM REV CODE 250: Performed by: PSYCHIATRY & NEUROLOGY

## 2021-12-17 PROCEDURE — 97530 THERAPEUTIC ACTIVITIES: CPT

## 2021-12-17 PROCEDURE — 99233 SBSQ HOSP IP/OBS HIGH 50: CPT | Mod: ,,, | Performed by: NEUROLOGICAL SURGERY

## 2021-12-17 PROCEDURE — 80053 COMPREHEN METABOLIC PANEL: CPT

## 2021-12-17 PROCEDURE — 25000003 PHARM REV CODE 250

## 2021-12-17 PROCEDURE — 63600175 PHARM REV CODE 636 W HCPCS: Performed by: PSYCHIATRY & NEUROLOGY

## 2021-12-17 PROCEDURE — 25000003 PHARM REV CODE 250: Performed by: NURSE PRACTITIONER

## 2021-12-17 PROCEDURE — A4216 STERILE WATER/SALINE, 10 ML: HCPCS | Performed by: PSYCHIATRY & NEUROLOGY

## 2021-12-17 PROCEDURE — 20000000 HC ICU ROOM

## 2021-12-17 PROCEDURE — 63600175 PHARM REV CODE 636 W HCPCS

## 2021-12-17 PROCEDURE — 82330 ASSAY OF CALCIUM: CPT

## 2021-12-17 PROCEDURE — 85014 HEMATOCRIT: CPT

## 2021-12-17 PROCEDURE — 84132 ASSAY OF SERUM POTASSIUM: CPT

## 2021-12-17 PROCEDURE — 25000242 PHARM REV CODE 250 ALT 637 W/ HCPCS: Performed by: PSYCHIATRY & NEUROLOGY

## 2021-12-17 PROCEDURE — 99900026 HC AIRWAY MAINTENANCE (STAT)

## 2021-12-17 PROCEDURE — 25000242 PHARM REV CODE 250 ALT 637 W/ HCPCS

## 2021-12-17 PROCEDURE — 82803 BLOOD GASES ANY COMBINATION: CPT

## 2021-12-17 PROCEDURE — 83735 ASSAY OF MAGNESIUM: CPT

## 2021-12-17 RX ORDER — ATORVASTATIN CALCIUM 20 MG/1
40 TABLET, FILM COATED ORAL DAILY
Status: DISCONTINUED | OUTPATIENT
Start: 2021-12-18 | End: 2021-12-24

## 2021-12-17 RX ORDER — METOPROLOL TARTRATE 1 MG/ML
5 INJECTION, SOLUTION INTRAVENOUS EVERY 5 MIN PRN
Status: COMPLETED | OUTPATIENT
Start: 2021-12-17 | End: 2021-12-20

## 2021-12-17 RX ORDER — FLUMAZENIL 0.1 MG/ML
0.5 INJECTION INTRAVENOUS ONCE
Status: COMPLETED | OUTPATIENT
Start: 2021-12-17 | End: 2021-12-17

## 2021-12-17 RX ORDER — AMLODIPINE BESYLATE 10 MG/1
10 TABLET ORAL DAILY
Status: DISCONTINUED | OUTPATIENT
Start: 2021-12-18 | End: 2021-12-24

## 2021-12-17 RX ORDER — HYDROXYCHLOROQUINE SULFATE 200 MG/1
200 TABLET, FILM COATED ORAL 2 TIMES DAILY
Status: DISCONTINUED | OUTPATIENT
Start: 2021-12-17 | End: 2021-12-24

## 2021-12-17 RX ORDER — MONTELUKAST SODIUM 10 MG/1
10 TABLET ORAL DAILY
Status: DISCONTINUED | OUTPATIENT
Start: 2021-12-18 | End: 2021-12-24

## 2021-12-17 RX ORDER — AMOXICILLIN 250 MG
1 CAPSULE ORAL DAILY
Status: DISCONTINUED | OUTPATIENT
Start: 2021-12-18 | End: 2021-12-24

## 2021-12-17 RX ORDER — BACLOFEN 10 MG/1
10 TABLET ORAL 3 TIMES DAILY
Status: DISCONTINUED | OUTPATIENT
Start: 2021-12-17 | End: 2021-12-18

## 2021-12-17 RX ORDER — QUETIAPINE FUMARATE 25 MG/1
25 TABLET, FILM COATED ORAL DAILY
Status: DISCONTINUED | OUTPATIENT
Start: 2021-12-17 | End: 2021-12-17

## 2021-12-17 RX ORDER — OLANZAPINE 5 MG/1
5 TABLET, ORALLY DISINTEGRATING ORAL NIGHTLY
Status: DISCONTINUED | OUTPATIENT
Start: 2021-12-17 | End: 2021-12-18

## 2021-12-17 RX ORDER — FLUMAZENIL 0.1 MG/ML
INJECTION INTRAVENOUS
Status: COMPLETED
Start: 2021-12-17 | End: 2021-12-17

## 2021-12-17 RX ORDER — LORAZEPAM 2 MG/ML
0.5 INJECTION INTRAMUSCULAR ONCE AS NEEDED
Status: COMPLETED | OUTPATIENT
Start: 2021-12-17 | End: 2021-12-17

## 2021-12-17 RX ADMIN — ACETYLCYSTEINE 4 ML: 100 INHALANT RESPIRATORY (INHALATION) at 08:12

## 2021-12-17 RX ADMIN — IPRATROPIUM BROMIDE AND ALBUTEROL SULFATE 3 ML: 2.5; .5 SOLUTION RESPIRATORY (INHALATION) at 11:12

## 2021-12-17 RX ADMIN — LORAZEPAM 0.5 MG: 2 INJECTION INTRAMUSCULAR; INTRAVENOUS at 04:12

## 2021-12-17 RX ADMIN — ACETYLCYSTEINE 4 ML: 100 INHALANT RESPIRATORY (INHALATION) at 02:12

## 2021-12-17 RX ADMIN — IPRATROPIUM BROMIDE AND ALBUTEROL SULFATE 3 ML: 2.5; .5 SOLUTION RESPIRATORY (INHALATION) at 04:12

## 2021-12-17 RX ADMIN — HEPARIN SODIUM 5000 UNITS: 5000 INJECTION INTRAVENOUS; SUBCUTANEOUS at 01:12

## 2021-12-17 RX ADMIN — ACETYLCYSTEINE 4 ML: 100 INHALANT RESPIRATORY (INHALATION) at 07:12

## 2021-12-17 RX ADMIN — IPRATROPIUM BROMIDE AND ALBUTEROL SULFATE 3 ML: 2.5; .5 SOLUTION RESPIRATORY (INHALATION) at 07:12

## 2021-12-17 RX ADMIN — FLUMAZENIL 0.5 MG: 0.1 INJECTION, SOLUTION INTRAVENOUS at 09:12

## 2021-12-17 RX ADMIN — LEVETIRACETAM 500 MG: 500 INJECTION, SOLUTION INTRAVENOUS at 10:12

## 2021-12-17 RX ADMIN — FLUMAZENIL 0.5 MG: 0.1 INJECTION INTRAVENOUS at 09:12

## 2021-12-17 RX ADMIN — HYDROXYCHLOROQUINE SULFATE 200 MG: 200 TABLET, FILM COATED ORAL at 10:12

## 2021-12-17 RX ADMIN — IPRATROPIUM BROMIDE AND ALBUTEROL SULFATE 3 ML: 2.5; .5 SOLUTION RESPIRATORY (INHALATION) at 08:12

## 2021-12-17 RX ADMIN — MUPIROCIN: 20 OINTMENT TOPICAL at 10:12

## 2021-12-17 RX ADMIN — HEPARIN SODIUM 5000 UNITS: 5000 INJECTION INTRAVENOUS; SUBCUTANEOUS at 10:12

## 2021-12-17 RX ADMIN — SODIUM CHLORIDE, SODIUM LACTATE, POTASSIUM CHLORIDE, AND CALCIUM CHLORIDE 1000 ML: .6; .31; .03; .02 INJECTION, SOLUTION INTRAVENOUS at 10:12

## 2021-12-17 RX ADMIN — IPRATROPIUM BROMIDE AND ALBUTEROL SULFATE 3 ML: 2.5; .5 SOLUTION RESPIRATORY (INHALATION) at 12:12

## 2021-12-17 RX ADMIN — BACLOFEN 10 MG: 10 TABLET ORAL at 10:12

## 2021-12-17 RX ADMIN — HEPARIN SODIUM 5000 UNITS: 5000 INJECTION INTRAVENOUS; SUBCUTANEOUS at 06:12

## 2021-12-17 RX ADMIN — Medication 10 ML: at 05:12

## 2021-12-17 RX ADMIN — Medication 10 ML: at 12:12

## 2021-12-17 RX ADMIN — METHYLPREDNISOLONE SODIUM SUCCINATE 20 MG: 40 INJECTION, POWDER, FOR SOLUTION INTRAMUSCULAR; INTRAVENOUS at 10:12

## 2021-12-17 RX ADMIN — LABETALOL HYDROCHLORIDE 5 MG: 5 INJECTION, SOLUTION INTRAVENOUS at 11:12

## 2021-12-17 NOTE — PLAN OF CARE
[facility] Care Plan    POC reviewed with Selma Lux and her daughters at 1000. Pt and her daughters both verbalized understanding. Questions and concerns addressed. Flowtrack.  Lasix x 1.  No acute events today. Pt progressing toward goals. Will continue to monitor. See below and flowsheets for full assessment and VS info.       Neuro:  [place] Coma Scale  Best Eye Response: 3-->(E3) to speech  Best Motor Response: 6-->(M6) obeys commands  Best Verbal Response: 5-->(V5) oriented  [place] Coma Scale Score: 14  Assessment Qualifiers: patient not sedated/intubated  Pupil PERRLA: yes     24 hr Temp:  [98.2 °F (36.8 °C)-99 °F (37.2 °C)]     CV:   Rhythm: sinus tachycardia  BP goals:   SBP < 160  MAP > 65    Resp:   O2 Device (Oxygen Therapy): nasal cannula w/ humidification  Oxygen Concentration (%): 28    Plan: N/A    GI/:  SCOOBY Total Score: 0  Diet/Nutrition Received: NPO  Last Bowel Movement: 12/13/21  Voiding Characteristics: ureteral catheter    Intake/Output Summary (Last 24 hours) at 12/16/2021 1811  Last data filed at 12/16/2021 1700  Gross per 24 hour   Intake 204.79 ml   Output 3070 ml   Net -2865.21 ml     Unmeasured Output  Urine Occurrence: 1  Stool Occurrence: 0  Pad Count: 1    Labs/Accuchecks:  Recent Labs   Lab 12/16/21  0159   WBC 13.39*   RBC 3.65*   HGB 10.4*   HCT 32.2*         Recent Labs   Lab 12/16/21  0159 12/16/21  0915 12/16/21  1209 12/16/21  1209 12/16/21  1721     --  144  --   --    K 2.9*   < > 4.0   < > 4.2   CO2 23  --  23  --   --      --  106  --   --    BUN 16  --  20  --   --    CREATININE 0.8  --  0.7  --   --    ALKPHOS 50*  --   --   --   --    ALT 13  --   --   --   --    AST 12  --   --   --   --    BILITOT 1.8*  --   --   --   --     < > = values in this interval not displayed.      Recent Labs   Lab 12/13/21  1121   INR 1.0   APTT 28.6      Recent Labs   Lab 12/13/21  1055          Electrolytes: Electrolytes replaced  Accuchecks: none    Gtts:    dexmedetomidine (PRECEDEX) infusion Stopped (12/15/21 1114)    niCARdipine Stopped (12/15/21 1015)       LDA/Wounds:  Lines/Drains/Airways       Peripherally Inserted Central Catheter Line              PICC Double Lumen 12/15/21 1616 right basilic 1 day              Drain                   Urethral Catheter 12/14/21 1031 2 days              Airway                   Nasopharyngeal Airway 2 days              Arterial Line              Arterial Line 12/13/21 1632 Right Radial 3 days              Peripheral Intravenous Line                   Peripheral IV - Single Lumen 12/13/21 1054 20 G Left Antecubital 3 days         Peripheral IV - Single Lumen 12/13/21 1453 22 G Anterior;Left Wrist 3 days                  Wounds: No  Wound care consulted: No

## 2021-12-17 NOTE — PROGRESS NOTES
[facility] - Neuro Critical Care  Neurocritical Care  Progress Note    Admit Date: 12/13/2021  Service Date: 12/17/2021  Length of Stay: 4    Subjective:     Chief Complaint: Acute subdural hematoma    History of Present Illness:   The patient is an 86-year-old female with past medical history of TIA, RA and Afib of Eliquis admitted to [facility] with Right SDH.  She was found on the floor this morning only responding to painful stimuli.  Patient occasionally says her name.GCS 12 noted for EMS.  PCC administered. She had a mechanical fall out of bed on Thursday hitting her head and has been complaining of intermittent headaches since. Her last dose of eliquis was on 12/11. Pending repeat CT head. NSGY consulted. In addition patient was found to be hyponatremic at 118-central line placed and hypertonic saline infusion initiated. Pending repeat CT head. Patient admitted to [facility] for close monitoring and higher level of care.       Hospital Course: 12/13/2021: patient admitted to [facility] s/p fall on 12/11 on Eliquis  12/14/2021 wheezing, prolonged expiratory phase.   12/15/2021 copious respiratory secretions. Remains encephalopathic. Review EEG  12/16/2021 improved respiratory secretions. D/c nasal tracheal airway if minimal secretions.   12/17/2021 restless.       Review of Symptoms: encephalopathic cannot participate    Medications:  Continuous Infusions:   dexmedetomidine (PRECEDEX) infusion Stopped (12/15/21 1114)    niCARdipine Stopped (12/15/21 1015)     Scheduled Meds:   acetylcysteine 100 mg/ml (10%)  4 mL Nebulization TID WAKE    albuterol-ipratropium  3 mL Nebulization Q4H    amLODIPine  10 mg Oral Daily    atorvastatin  40 mg Oral Daily    baclofen  10 mg Oral TID    heparin (porcine)  5,000 Units Subcutaneous Q8H    hydrOXYchloroQUINE  200 mg Oral BID    levetiracetam IV  500 mg Intravenous Q12H    methylPREDNISolone sodium succinate  20 mg Intravenous Daily    montelukast  10 mg Oral Daily    mupirocin    Nasal BID    mycophenolate  500 mg Oral BID    senna-docusate 8.6-50 mg  1 tablet Oral Daily    sodium chloride 0.9%  10 mL Intravenous Q6H     PRN Meds:.acetaminophen, albuterol-ipratropium, calcium gluconate IVPB, calcium gluconate IVPB, calcium gluconate IVPB, labetalol, magnesium sulfate IVPB, magnesium sulfate IVPB, ondansetron, potassium chloride in water **AND** potassium chloride in water **AND** potassium chloride in water, Flushing PICC Protocol **AND** sodium chloride 0.9% **AND** sodium chloride 0.9%    OBJECTIVE:   Vital Signs (Most Recent):   Temp: 99.1 °F (37.3 °C) (12/17/21 0701)  Pulse: (!) 123 (12/17/21 0821)  Resp: (!) 24 (12/17/21 0821)  BP: (!) 158/103 (12/17/21 0801)  SpO2: 99 % (12/17/21 0821)    Vital Signs (24h Range):   Temp:  [98 °F (36.7 °C)-99.1 °F (37.3 °C)] 99.1 °F (37.3 °C)  Pulse:  [] 123  Resp:  [14-37] 24  SpO2:  [98 %-100 %] 99 %  BP: (135-166)/() 158/103  Arterial Line BP: (124-156)/(57-76) 124/65    ICP/CPP (Last 24h):   CO:  [3.4 L/min-4.1 L/min] 3.8 L/min  CI:  [2 L/min/m2-2.4 L/min/m2] 2.2 L/min/m2    I & O (Last 24h):     Intake/Output Summary (Last 24 hours) at 12/17/2021 0907  Last data filed at 12/17/2021 0701  Gross per 24 hour   Intake 124.92 ml   Output 1630 ml   Net -1505.08 ml     Physical Exam: unchanged from previous day  GA: awake, comfortable, no acute distress.   HEENT: No scleral icterus or JVD. Neck supple  Pulmonary: Air entry equal to auscultation A/P/L. Wheezing +, crackles +  Cardiac: RRR, S1 & S2 w/o rubs/murmurs/gallops.   Abdominal: soft, non-tender, bowel sounds present x 4. No appreciable hepatosplenomegaly.  Skin: No jaundice, rashes, or visible lesions.  Neuro:  --Mental Status:  awake, follows one step commands. Spontaneous eye opening. Tracks visually (particularly to family at bedside).   --Pupils 3.5 mm, PERRL.   --Corneal reflex not done in this arousable patient, gag, cough intact.  Moves bilateral ue/le symmetrically  MSK:  No  edema in UE and LE    Vent Data:   Oxygen Concentration (%):  [28] 28    Lines/Drains/Airway:        Nasopharyngeal Airway (Active)   Measured At Nare 12/17/21 0428   Secured Location Left 12/17/21 0428   Site Condition Cool;Dry 12/17/21 0428      PICC Double Lumen 12/15/21 1616 right basilic (Active)   Verification by X-ray Yes 12/16/21 1905   Site Assessment No warmth;No swelling;No redness;No drainage 12/17/21 0701   Extremity Assessment Distal to IV No swelling;No redness;No abnormal discoloration 12/17/21 0701   Line Securement Device Secured with sutureless device 12/17/21 0701   Dressing Type Biopatch in place;Transparent (Tegaderm) 12/17/21 0701   Dressing Status Clean;Dry;Intact 12/17/21 0701   Dressing Intervention Integrity maintained 12/17/21 0701   Date on Dressing 12/15/21 12/17/21 0701   Dressing Due to be Changed 12/22/21 12/17/21 0701   Left Lumen Patency/Care Infusing 12/17/21 0701   Right Lumen Patency/Care Infusing 12/17/21 0701   Current Insertion Depth (cm) 33 cm 12/15/21 1616   Current Exposed Catheter (cm) 0 cm 12/15/21 1616   Extremity Circumference (cm) 33 cm 12/15/21 1616   Waveform Normal 12/15/21 1616   Line Necessity Review Poor venous access 12/17/21 0701      Arterial Line 12/13/21 1632 Right Radial (Active)   Site Assessment Clean;Dry;Intact;No redness;No swelling 12/17/21 0701   Line Status Pulsatile blood flow 12/17/21 0701   Art Line Waveform Appropriate;Square wave test performed;Whip 12/17/21 0701   Arterial Line Interventions Zeroed and calibrated;Leveled 12/17/21 0701   Color/Movement/Sensation Capillary refill less than 3 sec 12/17/21 0701   Dressing Type Biopatch in place;Transparent (Tegaderm) 12/17/21 0701   Dressing Status Clean;Dry;Intact 12/17/21 0701   Dressing Intervention Integrity maintained 12/17/21 0701   Dressing Change Due 12/20/21 12/17/21 0701   Tubing Change Due 12/17/21 12/17/21 0701           Urethral Catheter 12/14/21 1031 (Active)   Site Assessment  "Clean;Intact 12/17/21 0701   Collection Container Urimeter 12/17/21 0701   Securement Method secured to top of thigh w/ adhesive device 12/17/21 0701   Catheter Care Performed yes 12/17/21 0701   Reason for Continuing Urinary Catheterization Critically ill in ICU and requiring hourly monitoring of intake/output 12/17/21 0701   CAUTI Prevention Bundle StatLock in place w 1" slack;Intact seal between catheter & drainage tubing;Drainage bag/urimeter off the floor;Sheeting clip in use;No dependent loops or kinks;Drainage bag/urimeter not overfilled (<2/3 full);Drainage bag/urimeter below bladder 12/17/21 0701   Output (mL) 10 mL 12/17/21 0605         Nutrition/Tube Feeds (if NPO state why): npo, aspiration precautions     Labs:  ABG:   Recent Labs   Lab 12/17/21  0429   PH 7.490*   PO2 133*   PCO2 43.2   HCO3 33.0*   POCSATURATED 99   BE 10     BMP:  Recent Labs   Lab 12/17/21  0252   *   K 3.9      CO2 24   BUN 30*   CREATININE 0.9   *   MG 2.2   PHOS 3.4     LFT:   Lab Results   Component Value Date    AST 12 12/17/2021    ALT 14 12/17/2021    ALKPHOS 53 (L) 12/17/2021    BILITOT 2.0 (H) 12/17/2021    ALBUMIN 4.2 12/17/2021    PROT 6.5 12/17/2021     CBC:   Lab Results   Component Value Date    WBC 14.55 (H) 12/17/2021    HGB 11.7 (L) 12/17/2021    HCT 36 12/17/2021    MCV 90 12/17/2021     12/17/2021     Microbiology x 7d:   Microbiology Results (last 7 days)     ** No results found for the last 168 hours. **        Imaging:  CXR 12/15 - improved interstitial edema. Lines in place. Widened mediastinum (POA)  I personally reviewed the above image.  Today I independently reviewed pertinent medications, lines/drains/airways, imaging, cardiology results, laboratory results, microbiology results, notably:   ASSESSMENT/PLAN:     Active Hospital Problems    Diagnosis    *Acute subdural hematoma    Stroke    Hyponatremia    Age-related physical debility    Leukocytosis    RA (rheumatoid " arthritis)      Neuro:   SDH with brain compression  keppra for seizure prophylaxis  Acute encephalopathy likely multifactorial  Review EEG with epilepsy team  Trial of zyprexa oral disintegrating tablets    Pulmonary:   Solumedrol (conversion from home regimen of prednisone)  Duonebs/3% nacl/mucomyst  Consider repeating lasix today pm if BMP at noon is stable  Trial of cough assist to mobilize secretions.     Cardiac:   flotrac for advanced hemodynamic monitoiring  SVV >12, tachycardia - volume contraction. Fluid bolus  SBP goal <160mmhg    Renal:    Making urine  BUN/Cr >20  Hypernatremia - likely dehydrated. Bolus 500ml- 1L LR    ID:   Afebrile, normal wbc  Will monitor    Hem/Onc:   Transfuse for Hb <7 or 8 if associated with hypotension  plt count slight down trend but stable    Endocrine:    BS stable    Fluids/Electrolytes/Nutrition/GI:   Replete lytes as needed  Swallow eval today  Lines:  Art +  CVC +  ETT NA  Hill + d/c  NG NA  PEG NA    Proph:  DVT:SCD/heparin  Constipation:  Last output:bowel regimen as needed  PUP: NA  VAP: NA    Family (daughters) updated at bedside    Uninterrupted Critical Care/Counseling Time (not including procedures):: 30 mins    Juan Irwin MD, FNCS  Neurocritical care attending    Full Code    Juan Irwin MD  Neurocritical Care  [facility] - Neuro Critical Care

## 2021-12-17 NOTE — PLAN OF CARE
[facility] Care Plan    POC reviewed with Selma Lxu and family at 1400. Pt with slurred speech, verbalized understanding. Questions and concerns addressed. No acute events today. Pt progressing toward goals. Will continue to monitor. See below and flowsheets for full assessment and VS info    -Daughter at the bedside.  -Pt restless with slurred speech MD Michael With NCC team notified  -Per pts daughter and speech therapy Pt less interactive/verbal today. L arm twitching with physical therapy and L up gaze noticed. YOANA Arzate with NCC team notified.   -NGT placed by MARILYN Hatch. PT tolerated NGT placement well. KUB of abdomen ordered per protocol.     Neuro:  [place] Coma Scale  Best Eye Response: 4-->(E4) spontaneous  Best Motor Response: 6-->(M6) obeys commands  Best Verbal Response: 2-->(V2) incomprehensible speech  [place] Coma Scale Score: 12  Assessment Qualifiers: no eye obstruction present,patient not sedated/intubated  Pupil PERRLA: yes     24 hr Temp:  [98 °F (36.7 °C)-99.1 °F (37.3 °C)]     CV:   Rhythm: sinus tachycardia  BP goals:   SBP < 160  MAP > 65    Resp:   O2 Device (Oxygen Therapy): nasal cannula  Oxygen Concentration (%): 28    Plan: N/A    GI/:  SCOOBY Total Score: 0  Diet/Nutrition Received: NPO  Last Bowel Movement: 12/13/21  Voiding Characteristics: urethral catheter (bladder)    Intake/Output Summary (Last 24 hours) at 12/17/2021 1427  Last data filed at 12/17/2021 1401  Gross per 24 hour   Intake 43.74 ml   Output 1300 ml   Net -1256.26 ml     Unmeasured Output  Urine Occurrence: 1  Stool Occurrence: 0  Pad Count: 1    Labs/Accuchecks:  Recent Labs   Lab 12/15/21  0457 12/17/21  0252 12/17/21  0429   WBC  --  14.55*  --    RBC  --  4.11  --    HGB  --  11.7*  --    HCT   < > 36.9* 36   PLT  --  153  --     < > = values in this interval not displayed.      Recent Labs   Lab 12/17/21  0252   *   K 3.9   CO2 24      BUN 30*   CREATININE 0.9   ALKPHOS 53*   ALT 14   AST 12   BILITOT 2.0*       Recent Labs   Lab 12/13/21  1121   INR 1.0   APTT 28.6      Recent Labs   Lab 12/13/21  1055          Electrolytes: N/A - electrolytes WDL  Accuchecks: Q6H    Gtts:       LDA/Wounds:  Lines/Drains/Airways       Peripherally Inserted Central Catheter Line              PICC Double Lumen 12/15/21 1616 right basilic 1 day              Drain                   Urethral Catheter 12/14/21 1031 3 days         NG/OG Tube 12/17/21 1405 Left nostril <1 day              Arterial Line              Arterial Line 12/13/21 1632 Right Radial 3 days              Peripheral Intravenous Line                   Peripheral IV - Single Lumen 12/13/21 1453 22 G Anterior;Left Wrist 3 days                  Wounds: No  Wound care consulted: No

## 2021-12-17 NOTE — PROGRESS NOTES
[facility]  Neurosurgery  Progress Note    Subjective:     History of Present Illness: 86-year-old female with past medical history of TIA comes to the emergency department for AMS.  She was found on the floor about 1 hour ago only responding to painful stimuli.  Patient occasionally says her name.GCS 12 noted for EMS.  Patient does take blood thinners. She had a mechanical fall out of bed on Thursday hitting her head and has been complaining of intermittent headaches since. Her last dose of eliquis was on 12/11. NSGY consulted for CTH with SDH      Post-Op Info:  * No surgery found *           Interval history: NAEO. Neuro improved. Still on EEG. copious respiratory secretions yesterday. EEG read pending. Normonatremic.       Vitals:    12/17/21 0821   BP:    Pulse: (!) 123   Resp: (!) 24   Temp:            Review of Systems    Objective:     Weight: 67 kg (147 lb 11.3 oz)  Body mass index is 25.35 kg/m².  Vital Signs (Most Recent):  Temp: 99.1 °F (37.3 °C) (12/17/21 0701)  Pulse: (!) 123 (12/17/21 0821)  Resp: (!) 24 (12/17/21 0821)  BP: 136/66 (12/17/21 0701)  SpO2: 99 % (12/17/21 0821) Vital Signs (24h Range):  Temp:  [98 °F (36.7 °C)-99.1 °F (37.3 °C)] 99.1 °F (37.3 °C)  Pulse:  [] 123  Resp:  [14-37] 24  SpO2:  [98 %-100 %] 99 %  BP: (135-166)/() 136/66  Arterial Line BP: (130-156)/(57-76) 136/66     Date 12/17/21 0700 - 12/18/21 0659   Shift 5345-2512 6145-1959 3525-6172 24 Hour Total   INTAKE   I.V.(mL/kg) 5(0.1)   5(0.1)   Shift Total(mL/kg) 5(0.1)   5(0.1)   OUTPUT   Shift Total(mL/kg)       Weight (kg) 67 67 67 67              Oxygen Concentration (%):  [28] 28    Female External Urinary Catheter 12/13/21 1424 (Active)       Neuro exam:    E4V4M6.  AAOx2, slowed mentation  PERRL  CN grossly intact  FC x4 wiggling toes and squeezing hands  On EEG          Significant Labs:  Recent Labs   Lab 12/16/21  0159 12/16/21  0915 12/16/21  1209 12/16/21  1721 12/17/21  0252   GLU  126*  --  131*  --  115*     --  144  --  148*   K 2.9*   < > 4.0 4.2 3.9     --  106  --  107   CO2 23  --  23  --  24   BUN 16  --  20  --  30*   CREATININE 0.8  --  0.7  --  0.9   CALCIUM 9.4  --  9.3  --  10.1   MG 1.8  --   --   --  2.2    < > = values in this interval not displayed.     Recent Labs   Lab 12/16/21  0159 12/17/21 0252 12/17/21 0429   WBC 13.39* 14.55*  --    HGB 10.4* 11.7*  --    HCT 32.2* 36.9* 36    153  --      No results for input(s): LABPT, INR, APTT in the last 48 hours.  Microbiology Results (last 7 days)     ** No results found for the last 168 hours. **        All pertinent labs from the last 24 hours have been reviewed.    Significant Diagnostics:  I have reviewed all pertinent imaging results/findings within the past 24 hours.      Significant Labs:  Recent Labs   Lab 12/16/21  0159 12/16/21  0915 12/16/21  1209 12/16/21  1721 12/17/21 0252   *  --  131*  --  115*     --  144  --  148*   K 2.9*   < > 4.0 4.2 3.9     --  106  --  107   CO2 23  --  23  --  24   BUN 16  --  20  --  30*   CREATININE 0.8  --  0.7  --  0.9   CALCIUM 9.4  --  9.3  --  10.1   MG 1.8  --   --   --  2.2    < > = values in this interval not displayed.     Recent Labs   Lab 12/16/21 0159 12/17/21 0252 12/17/21 0429   WBC 13.39* 14.55*  --    HGB 10.4* 11.7*  --    HCT 32.2* 36.9* 36    153  --      No results for input(s): LABPT, INR, APTT in the last 48 hours.  Microbiology Results (last 7 days)     ** No results found for the last 168 hours. **           Assessment/Plan:     * Acute subdural hematoma  86F with on eliquis s/p head trauma 1 week prior now with small aSDH and AMS.     No acute events. Interval imaprovement in exam. E4V4M6.      --admitted to NCC  --CTH: R temp/paretial aSDH 5-6mm thickness, 3-5mm MLS, stable on repeat  --holding eliquis  --EEG pending, f/u   --keppra, AED per NCC  --SBP<150, HOB > 30  --Na >135, greatly improved, now 144 off  hypertonics.   --ok for SQH  --no acute neurosurgical intervention at this time  --rest of care per NCC    --We will continue to follow.     --Please page NSGY immediately for any changes in neuro status        Emanuel Cantu MD  Neurosurgery  [facility]

## 2021-12-17 NOTE — CONSULTS
Inpatient consult to Physical Medicine Rehab  Consult performed by: Pam Katz NP  Consult ordered by: Selwyn Egan MD      Consult received.  Reviewed patient history and current admission.  PM&R following.  Pam Katz NP  Physical Medicine & Rehabilitation   12/17/2021

## 2021-12-17 NOTE — SUBJECTIVE & OBJECTIVE
Interval history: NAEO. Neuro improved. Still on EEG. copious respiratory secretions yesterday. EEG read pending. Normonatremic.       Vitals:    12/17/21 0821   BP:    Pulse: (!) 123   Resp: (!) 24   Temp:            Review of Systems    Objective:     Weight: 67 kg (147 lb 11.3 oz)  Body mass index is 25.35 kg/m².  Vital Signs (Most Recent):  Temp: 99.1 °F (37.3 °C) (12/17/21 0701)  Pulse: (!) 123 (12/17/21 0821)  Resp: (!) 24 (12/17/21 0821)  BP: 136/66 (12/17/21 0701)  SpO2: 99 % (12/17/21 0821) Vital Signs (24h Range):  Temp:  [98 °F (36.7 °C)-99.1 °F (37.3 °C)] 99.1 °F (37.3 °C)  Pulse:  [] 123  Resp:  [14-37] 24  SpO2:  [98 %-100 %] 99 %  BP: (135-166)/() 136/66  Arterial Line BP: (130-156)/(57-76) 136/66     Date 12/17/21 0700 - 12/18/21 0659   Shift 0182-0029 0486-0837 1006-0912 24 Hour Total   INTAKE   I.V.(mL/kg) 5(0.1)   5(0.1)   Shift Total(mL/kg) 5(0.1)   5(0.1)   OUTPUT   Shift Total(mL/kg)       Weight (kg) 67 67 67 67              Oxygen Concentration (%):  [28] 28    Female External Urinary Catheter 12/13/21 1424 (Active)       Neuro exam:    E4V4M6.  AAOx2, slowed mentation  PERRL  CN grossly intact  FC x4 wiggling toes and squeezing hands  On EEG          Significant Labs:  Recent Labs   Lab 12/16/21  0159 12/16/21  0915 12/16/21  1209 12/16/21  1721 12/17/21  0252   *  --  131*  --  115*     --  144  --  148*   K 2.9*   < > 4.0 4.2 3.9     --  106  --  107   CO2 23  --  23  --  24   BUN 16  --  20  --  30*   CREATININE 0.8  --  0.7  --  0.9   CALCIUM 9.4  --  9.3  --  10.1   MG 1.8  --   --   --  2.2    < > = values in this interval not displayed.     Recent Labs   Lab 12/16/21  0159 12/17/21  0252 12/17/21  0429   WBC 13.39* 14.55*  --    HGB 10.4* 11.7*  --    HCT 32.2* 36.9* 36    153  --      No results for input(s): LABPT, INR, APTT in the last 48 hours.  Microbiology Results (last 7 days)     ** No results found for the last 168 hours. **        All  pertinent labs from the last 24 hours have been reviewed.    Significant Diagnostics:  I have reviewed all pertinent imaging results/findings within the past 24 hours.      Significant Labs:  Recent Labs   Lab 12/16/21  0159 12/16/21  0915 12/16/21  1209 12/16/21  1721 12/17/21  0252   *  --  131*  --  115*     --  144  --  148*   K 2.9*   < > 4.0 4.2 3.9     --  106  --  107   CO2 23  --  23  --  24   BUN 16  --  20  --  30*   CREATININE 0.8  --  0.7  --  0.9   CALCIUM 9.4  --  9.3  --  10.1   MG 1.8  --   --   --  2.2    < > = values in this interval not displayed.     Recent Labs   Lab 12/16/21 0159 12/17/21  0252 12/17/21  0429   WBC 13.39* 14.55*  --    HGB 10.4* 11.7*  --    HCT 32.2* 36.9* 36    153  --      No results for input(s): LABPT, INR, APTT in the last 48 hours.  Microbiology Results (last 7 days)     ** No results found for the last 168 hours. **

## 2021-12-17 NOTE — PROGRESS NOTES
[facility]  Adult Nutrition  Progress Note    SUMMARY       Recommendations    1. Please initiate TF if/when able: Isosource 1.5 @ 10 mL/hr advancing to goal rate 40 mL/hr    - This provides 1440 kcal, 65 g protein, and 733 mL free water    - Additional water per MD, hold for n/v or residuals >500 mL     2. NPO per SLP    - ADAT and per SLP recs to Cardiac, fulids per MD     3. RD to monitor    Goals: Pt will receive nutrition by RD f/u  Nutrition Goal Status: new    Communication of RD Recs:  (POC)    Assessment and Plan    Nutrition Problem  Inadequate energy intake    Related to (etiology):   Inability to consume sufficient energy    Signs and Symptoms (as evidenced by):   NPO with no alternate means of nutrition, Swallowing difficulties    Interventions (treatment strategy):  Collaboration with other providers  Enteral nutrition    Nutrition Diagnosis Status:   New     Malnutrition Assessment       Orbital Region (Subcutaneous Fat Loss): well nourished  Upper Arm Region (Subcutaneous Fat Loss): mild depletion   [place] Region (Muscle Loss): well nourished  Clavicle Bone Region (Muscle Loss): well nourished  Clavicle and Acromion Bone Region (Muscle Loss): well nourished  Scapular Bone Region (Muscle Loss): well nourished  Dorsal Hand (Muscle Loss): well nourished   Edema (Fluid Accumulation): 2-->mild   Subcutaneous Fat Loss (Final Summary): well nourished  Muscle Loss Evaluation (Final Summary): well nourished         Reason for Assessment    Reason For Assessment: NPO/clear liquids x 5 days  Diagnosis:  (SDH)  Relevant Medical History: TIA, RA  Interdisciplinary Rounds: did not attend    General Information Comments: Pt presents with SDH. Unable to obtain hx 2/2 pt nonverbal and no family at bedside. Per RN, pt NPO x 3 days. Per chart, pt with hx of PEG tube 2/2 dysphgia. Pt remains NPO per SLP. Pt follows regular diet at home per chart review. Plans to d/c to rehab. NFPE completed 12/17; pt  "with some mild age-appropriate wasting. No other s/s of malnutrition. Pt at risk for acute malnutrition if NPO remains.    Nutrition Discharge Planning: Adequate nutrition    Nutrition Risk Screen    Nutrition Risk Screen: dysphagia or difficulty swallowing    Nutrition/Diet History    Patient Reported Diet/Restrictions/Preferences: general  Spiritual, Cultural Beliefs, Synagogue Practices, Values that Affect Care: no  Food Allergies: NKFA  Factors Affecting Nutritional Intake: NPO,difficulty/impaired swallowing    Anthropometrics    Temp: 98.9 °F (37.2 °C)  Height Method: Estimated  Height: 5' 4" (162.6 cm)  Height (inches): 64 in  Weight Method: Bed Scale  Weight: 67 kg (147 lb 11.3 oz)  Weight (lb): 147.71 lb  Ideal Body Weight (IBW), Female: 120 lb  % Ideal Body Weight, Female (lb): 123.09 %  BMI (Calculated): 25.3  BMI Grade: 25 - 29.9 - overweight       Lab/Procedures/Meds    Pertinent Labs Reviewed: reviewed  Pertinent Labs Comments: Na 148, BUN 30, Alk phos 53, Bili total 2.0  Pertinent Medications Reviewed: reviewed  Pertinent Medications Comments: amlodipine, lipitor, heparin, prednisone, mycophenolate, senna, precedex, nicardipine    Estimated/Assessed Needs    Weight Used For Calorie Calculations: 66.7 kg (147 lb)  Energy Calorie Requirements (kcal): 1377 kcal/day  Energy Need Method: [place] (x 1.25)  Protein Requirements: 80-94 g/day (1.2-1.4 g/kg)  Weight Used For Protein Calculations: 66.7 kg (147 lb)  Fluid Requirements (mL): 1 mL/kcal or per MD  Estimated Fluid Requirement Method: RDA Method  RDA Method (mL): 1377       Nutrition Prescription Ordered    Current Diet Order: NPO    Evaluation of Received Nutrient/Fluid Intake    I/O: -6.5L since admit  Energy Calories Required: not meeting needs  Protein Required: not meeting needs  Comments: LBM 12/13  Tolerance:  (NPO)  % Intake of Estimated Energy Needs: 0 - 25 %  % Meal Intake: NPO    Nutrition Risk    Level of Risk/Frequency of " Follow-up: low     Monitor and Evaluation    Food and Nutrient Intake: energy intake,food and beverage intake,enteral nutrition intake  Food and Nutrient Adminstration: diet order,enteral and parenteral nutrition administration  Knowledge/Beliefs/Attitudes: food and nutrition knowledge/skill  Physical Activity and Function: nutrition-related ADLs and IADLs  Anthropometric Measurements: weight change,weight  Biochemical Data, Medical Tests and Procedures: gastrointestinal profile,electrolyte and renal panel,glucose/endocrine profile,inflammatory profile,lipid profile  Nutrition-Focused Physical Findings: overall appearance,extremities, muscles and bones     Nutrition Follow-Up    RD Follow-up?: Yes

## 2021-12-17 NOTE — PLAN OF CARE
Rec cont npo at this time.  Difficulty initiating a swallow noted today with overt coughing with minimal puree trials.  SLP to continue to follow.

## 2021-12-17 NOTE — ASSESSMENT & PLAN NOTE
86F with on eliquis s/p head trauma 1 week prior now with small aSDH and AMS.     No acute events. Interval imaprovement in exam. E4V4M6.      --admitted to NCC  --CTH: R temp/paretial aSDH 5-6mm thickness, 3-5mm MLS, stable on repeat  --holding eliquis  --EEG pending, f/u   --keppra, AED per NCC  --SBP<150, HOB > 30  --Na >135, greatly improved, now 144 off hypertonics.   --ok for SQH  --no acute neurosurgical intervention at this time  --rest of care per NCC    --We will continue to follow.     --Please page NSGY immediately for any changes in neuro status

## 2021-12-17 NOTE — PLAN OF CARE
[facility] Care Plan    POC reviewed with Selma Lux and family at 0300. Pt unable to verbalized understanding. Daughter at bedside at updated. Questions and concerns addressed. No acute events overnight. Pt progressing toward goals. Will continue to monitor. See below and flowsheets for full assessment and VS info.       Neuro:  [place] Coma Scale  Best Eye Response: 3-->(E3) to speech  Best Motor Response: 6-->(M6) obeys commands  Best Verbal Response: 5-->(V5) oriented  [place] Coma Scale Score: 14  Assessment Qualifiers: patient not sedated/intubated  Pupil PERRLA: yes     24hr Temp:  [98 °F (36.7 °C)-99 °F (37.2 °C)]     CV:   Rhythm: sinus tachycardia  BP goals:   SBP < 160  MAP > 65    Resp:   O2 Device (Oxygen Therapy): nasal cannula  Oxygen Concentration (%): 28    Plan: N/A    GI/:  SCOOBY Total Score: 0  Diet/Nutrition Received: NPO  Last Bowel Movement: 12/13/21  Voiding Characteristics: ureteral catheter    Intake/Output Summary (Last 24 hours) at 12/17/2021 0533  Last data filed at 12/17/2021 0505  Gross per 24 hour   Intake 214.79 ml   Output 1790 ml   Net -1575.21 ml     Unmeasured Output  Urine Occurrence: 1  Stool Occurrence: 0  Pad Count: 1    Labs/Accuchecks:  Recent Labs   Lab 12/15/21  0457 12/17/21  0252 12/17/21  0429   WBC  --  14.55*  --    RBC  --  4.11  --    HGB  --  11.7*  --    HCT   < > 36.9* 36   PLT  --  153  --     < > = values in this interval not displayed.      Recent Labs   Lab 12/17/21  0252   *   K 3.9   CO2 24      BUN 30*   CREATININE 0.9   ALKPHOS 53*   ALT 14   AST 12   BILITOT 2.0*      Recent Labs   Lab 12/13/21  1121   INR 1.0   APTT 28.6      Recent Labs   Lab 12/13/21  1055          Electrolytes: N/A - electrolytes WDL  Accuchecks: none    Gtts:   dexmedetomidine (PRECEDEX) infusion Stopped (12/15/21 1114)    niCARdipine Stopped (12/15/21 1015)       LDA/Wounds:  Lines/Drains/Airways       Peripherally Inserted Central Catheter Line              PICC  Double Lumen 12/15/21 1616 right basilic 1 day              Drain                   Urethral Catheter 12/14/21 1031 2 days              Airway                   Nasopharyngeal Airway 3 days              Arterial Line              Arterial Line 12/13/21 1632 Right Radial 3 days              Peripheral Intravenous Line                   Peripheral IV - Single Lumen 12/13/21 1453 22 G Anterior;Left Wrist 3 days                  Wounds: No  Wound care consulted: Yes

## 2021-12-17 NOTE — PT/OT/SLP PROGRESS
"Speech Language Pathology Treatment    Patient Name:  Selma Lux   MRN:  [id number]  Admitting Diagnosis: Acute subdural hematoma    Recommendations:                 General Recommendations:  Dysphagia therapy, Speech/language therapy and Cognitive-linguistic therapy  Diet recommendations:  NPO, Liquid Diet Level: NPO   Aspiration Precautions: Frequent oral care and Strict aspiration precautions   General Precautions: Standard, aspiration,fall,NPO,respiratory  Communication strategies:  provide increased time to answer and go to room if call light pushed    Subjective     "No, I don't have pain."     Nurse cleared for session. Nasal trumpet removed.     Pain/Comfort:  · Pain Rating 1: 0/10  · Pain Rating Post-Intervention 1: 0/10    Respiratory Status: Room air    Objective:     Has the patient been evaluated by SLP for swallowing?   Yes  Keep patient NPO? Yes     Pt seen bedside with daughter and nurse present at beginning of session.  Pt with improved alertness though decreased attention today.  Restless noted t/o session with limited verbalizations elicited.  Simple commands followed x2 with max cues.  Simple y/n questions answered x2 only.  Short phrase elicited x3 only.  Vocal quality remains hoarse and somewhat strained.  Less coughing on secretions noted.  Oral care provided with suction.  HOB raised upright for safety. Pt presented with ice chip trial x2.  Max cues provided to initiate minimal mastication.  Pharyngeal swallow not elicited despite max cues.  Anterior loss of melted ice chip noted.  Oral suction utilized to clear.  1/4 tsp of puree presented x2 with delayed oral transit and swallow initiation with anterior loss of puree mixed with saliva and overt cough response prior to swallow initiation x1.  Oral sucton utilized to clear.  No further trials presented 2/2 high aspiration risk.  Education provided to pt re: cont npo and ongoing SLP POC.  Education to be ongoing.                    Assessment: "     Selma Lux is a 86 y.o. female with an SLP diagnosis of Dysphagia, Dysarthria and Cognitive-Linguistic Impairment.     Multidisciplinary Problems     SLP Goals        Problem: SLP Goal    Goal Priority Disciplines Outcome   SLP Goal     SLP Ongoing, Progressing   Description: Speech Language Pathology Goals  Goals expected to be met by 12/30:  1. Pt will participate in ongoing swallowing assessment.   2. Pt will maintain alertness for 5 min intervals IND.   3. Pt will complete complex comprehension tasks with 90% accy given min cues.   4. Pt will implement simple speech strategies at phrase level with 80% intelligibility given min cues.   5. Pt will participate in ongoing assessment of language/cognition as appropriate.                       Plan:     · Patient to be seen:  5 x/week   · Plan of Care expires:  01/14/21  · Plan of Care reviewed with:  patient,daughter   · SLP Follow-Up:  Yes       Discharge recommendations:  rehabilitation facility   Barriers to Discharge:  Level of Skilled Assistance Needed      Time Tracking:     SLP Treatment Date:   12/17/21  Speech Start Time:  0931  Speech Stop Time:  0952     Speech Total Time (min):  21 min    Billable Minutes: Speech Therapy Individual 10 and Treatment Swallowing Dysfunction 11    12/17/2021

## 2021-12-17 NOTE — NURSING
16:15 pt transported to CT, second floor.  Transported via bed, on 2L NC. Ambu bag at the bedside.       16:30 pt back to room 9093/ Transported via bed. On 2L NC. On continues/portable monitoring. Ambu bag at the bedside.

## 2021-12-17 NOTE — PLAN OF CARE
Recommendations    1. Please initiate TF if/when able: Isosource 1.5 @ 10 mL/hr advancing to goal rate 40 mL/hr    - This provides 1440 kcal, 65 g protein, and 733 mL free water    - Additional water per MD, hold for n/v or residuals >500 mL     2. NPO per SLP    - ADAT and per SLP recs to Cardiac, fulids per MD     3. RD to monitor    Goals: Pt will receive nutrition by RD f/u  Nutrition Goal Status: new    Communication of RD Recs:  (POC)    Janae Lagos, MS, RD, LDN  Ext: [phone]

## 2021-12-17 NOTE — PT/OT/SLP PROGRESS
Physical Therapy Co-Treatment    Patient Name:  Selma Lux   MRN:  [id number]    Recommendations:     Discharge Recommendations:  rehabilitation facility   Discharge Equipment Recommendations:  (TBD)   Barriers to discharge: Increased level of assist and Inaccessible home    Assessment:     Selma Lux is a 86 y.o. female admitted with a medical diagnosis of Acute subdural hematoma.  She presents with the following impairments/functional limitations: weakness,impaired endurance,impaired self care skills,impaired functional mobilty,impaired balance,gait instability,impaired cognition,decreased upper extremity function,decreased lower extremity function,decreased safety awareness,impaired coordination,impaired cardiopulmonary response to activity. These deficits affect their roles and responsibilities in which they were able to complete prior to admit. Selma Lux would continue to benefit from acute PT intervention to improve quality of life and focus on recovery of impairments. Once medically stable, recommending pt discharge to Inpatient Rehabilitation Facility.     Patient more lethargic compared to eval, minimally verbal. LUE noted to have chorea-like movements in bed. In addition, eyes noted to be rolled backwards, but resolved once sitting edge of bed. These chorea-like movements increased when sitting edge of bed and then included LLE. Returned patient to supine and notified RN of neuro change.    Rehab Prognosis: Fair; patient continues to benefit from acute skilled PT services to address these deficits and reach maximum level of function.  Patient remains most appropriate to discharge to rehabilitation facility.  Recent Surgery: * No surgery found *      Plan:     During this hospitalization, patient to be seen 3 x/week to address the identified rehab impairments via gait training,therapeutic activities,therapeutic exercises,neuromuscular re-education and progress toward the following goals:    · Plan of  Care Expires:  01/16/22    Subjective     Chief Complaint: None verbalized   Patient/Family Comments/Goals: Agreeable to treatment  Pain/Comfort:  · Pain Rating 1: 0/10  · Pain Rating Post-Intervention 1: 0/10    Objective:     Communicated with RN prior to session. Patient found HOB elevated with telemetry,SCD,peripheral IV,oxygen,pulse ox (continuous),parikh catheter,blood pressure cuff,arterial line,restraints,Other (comments) (flow trac), bilateral wedges upon PT entry to room.     General Precautions: Standard, aphasia,fall,NPO   Orthopedic Precautions:N/A   Braces: N/A  Oxygen Device:      Functional Mobility:  · Bed Mobility:     · Rolling Left:  maximal assistance of 2 persons  · Rolling Right: maximal assistance of 2 persons  · Scooting: maximal assistance of 2 persons  · Supine to Sit: maximal assistance of 2 persons  · Sit to Supine: maximal assistance of 2 persons  · Transfers:  Deferred due to poor sitting balance   · Balance:   · Static Sitting: Poor, able to maintain for 9 minute(s) with total assistance, patient with increased chorea-like movements in LUE and LLE once sitting edge of bed, PT holding L hand to attempt to decrease chorea-like movements, appears to attempt to reposition using LUE  · Dynamic Sitting: Poor: Patient unable to accept challenge or move without loss of balance, total assistance    AM-PAC 6 CLICK MOBILITY  Turning over in bed (including adjusting bedclothes, sheets and blankets)?: 2  Sitting down on and standing up from a chair with arms (e.g., wheelchair, bedside commode, etc.): 1  Moving from lying on back to sitting on the side of the bed?: 2  Moving to and from a bed to a chair (including a wheelchair)?: 1  Need to walk in hospital room?: 1  Climbing 3-5 steps with a railing?: 1  Basic Mobility Total Score: 8     Therapeutic Activities and Exercises:  Patient educated on role of acute care PT and PT POC  Whiteboard updated   RN notified of chorea-like movements in LUE and  LLE  Educated about breathing techniques while sitting edge of bed in attempt to decrease agitation    Patient left HOB elevated with all lines intact, call button in reach, RN notified, bed alarm on, restraints reapplied at end of session and bilateral wedges placed.    GOALS:   Multidisciplinary Problems     Physical Therapy Goals        Problem: Physical Therapy Goal    Goal Priority Disciplines Outcome Goal Variances Interventions   Physical Therapy Goal     PT, PT/OT Ongoing, Progressing     Description: Goals to be met by: 2021     Patient will increase functional independence with mobility by performin. Supine to sit with moderate assistance  2. Sit to supine with moderate assistance  3. Sit to stand transfer with maximal assistance  4. Bed to chair transfer with maximal assistance using LRAD as needed  5. Gait  x 5 feet with maximal assistance using LRAD as needed  6. Ascend/descend 5 stair with bilateral handrails maximal assistance using LRAD as needed  7. Lower extremity exercise program x10 reps per handout, with independence                      Time Tracking:     PT Received On: 21  PT Start Time: 1313     PT Stop Time: 1336  PT Total Time (min): 23 min     Billable Minutes: Therapeutic Activity 15 min and Neuromuscular Re-education 8 min       PT/PTA: PT     PTA Visit Number: 0     2021    Co-treatment performed for this visit due to patient need for two skilled therapists to ensure patient and staff safety and to accommodate for patient activity tolerance/pain management '

## 2021-12-17 NOTE — PT/OT/SLP PROGRESS
Occupational Therapy   Co-Treatment  Co-treatment with PT for maximal pt participation, safety, and activity tolerance     Name: Selma Lux  MRN: [id number]  Admitting Diagnosis:  Acute subdural hematoma       Recommendations:     Discharge Recommendations: rehabilitation facility  Discharge Equipment Recommendations:   (tbd)  Barriers to discharge:       Assessment:     Selma Lux is a 86 y.o. female with a medical diagnosis of Acute subdural hematoma.  She presents with impaired ADL and mobility performance deficits. Pt found upright with pt showing inhibited performance capacity in comparison to at eval. Pt with decreased command following noted and spontaneous LUE chorea-like movements. RN immediately notified with team made aware of neurological change. During bed mobility and EOB balance tasks, pt continued to display no control of LUE writhing movements and poor trunk control however visibly attempting to participate at EOB using LUE for posturing. Pt ultimately required max A x2 for bed mobility with pt initiating movements and total A at EOB 2/2 current deficits in trunk and UE management. It is important to note that pt showed spotneanous L eye gaze movements at start of session however resolved following EOB balance tasks with pt attempting to track to voice and commands. Pt was left upright in bed with restraints fastened and RN present. Pt currently is a high fall risk and is not at her baseline. Pt would benefit from continued OT skilled services 3x/wk to improve daily living skills to optimize QOL. Pt is recommended to discharge to rehab at this time.    Performance deficits affecting function are weakness,impaired self care skills,impaired balance,decreased coordination,decreased safety awareness,decreased ROM,impaired endurance,impaired functional mobilty,decreased upper extremity function,decreased lower extremity function,impaired cognition,gait instability,impaired fine motor,impaired  coordination,impaired cardiopulmonary response to activity.     Rehab Prognosis:  Fair; patient would benefit from acute skilled OT services to address these deficits and reach maximum level of function.       Plan:     Patient to be seen 3 x/week to address the above listed problems via self-care/home management,therapeutic activities,therapeutic exercises,neuromuscular re-education,sensory integration,cognitive retraining  · Plan of Care Expires: 01/16/22  · Plan of Care Reviewed with: patient    Subjective     Pain/Comfort:  · Pain Rating 1: 0/10  · Pain Rating Post-Intervention 1: 0/10  · Pain Rating Post-Intervention 2: 0/10    Objective:     Communicated with: RN prior to session.  Patient found HOB elevated with SCD,telemetry,peripheral IV,oxygen,pulse ox (continuous),parikh catheter,blood pressure cuff,arterial line,restraints (flow track) upon OT entry to room.    General Precautions: Standard, fall,aspiration,NPO   Orthopedic Precautions:N/A   Braces: N/A  Respiratory Status: Nasal cannula, flow 3 L/min     Occupational Performance:     Bed Mobility:    · Patient completed Rolling/Turning to Left with  maximal assistance and 2 persons  · Patient completed Rolling/Turning to Right with maximal assistance and 2 persons  · Patient completed Scooting/Bridging with maximal assistance and 2 persons  · Patient completed Supine to Sit with maximal assistance and 2 persons  · Patient completed Sit to Supine with maximal assistance and 2 persons     Functional Mobility/Transfers:  · Functional Mobility: Pt sat EOB ~9 minutes with total A however pt attempting to position self and with initiation at command following for safety. Pt unable to control writing chorea-like movements of LUE at EOB and limited purposeful trunk control. Pt appearing to reposition self using obliques and using LUE to self-adjust. Pt able to briefly maintain static neck posturing and visual tracking strategies integrated (accurate 50% time).  Trace movement noted to RUE with pt initiating  squeezes and trace activation of bicep during rolling.    Activities of Daily Living:  · Upper Body Dressing: total assistance adjusting gown at EOB   · Lower Body Dressing: total assistance donning/doffing SCDs and socks   · Toileting: total assistance using parikh      AMPAC 6 Click ADL: 6    Treatment & Education:  Pt educated on role of occupational therapy, POC, and safety during ADLs and functional mobility. Pt and OT discussed importance of safe, continued mobility to optimize daily living skills. Pt did not verbalize understanding.   Pt completed the following during session: bed mobility, UB/LB dressing and toileting needs, command following and visual tracking strategies (4 minutes at EOB), posturing and breathing strategies for relaxation, deep stimulation to inhibit writhing of LUE to tolerance, safe repositioning for skin integrity and joint protection, discussion of changes with RN.   White board updated during session. Pt given instruction to call for medical staff/nurse for assistance.       Patient left HOB elevated with all lines intact, call button in reach, bed alarm on, restraints reapplied at end of session, RN notified and RN presentEducation:      GOALS:   Multidisciplinary Problems     Occupational Therapy Goals        Problem: Occupational Therapy Goal    Goal Priority Disciplines Outcome Interventions   Occupational Therapy Goal     OT, PT/OT Ongoing, Progressing    Description: Goals to be met by: 12/26/2021 (10 days)     Patient will increase functional independence with ADLs by performing:    UE Dressing with Moderate Assistance.  LE Dressing with Maximum Assistance.  Grooming while seated with Minimal Assistance.  Toileting from bedside commode with Minimal Assistance for hygiene and clothing management.   Sitting at edge of bed x10 minutes with Minimal Assistance.  Rolling to Bilateral with Minimal Assistance.   Supine to sit with  Minimal Assistance.  Stand pivot transfers with Moderate Assistance.  Step transfer with Moderate Assistance  Toilet transfer to bedside commode with Moderate Assistance.                     Time Tracking:     OT Date of Treatment: 12/17/21  OT Start Time: 1313  OT Stop Time: 1336  OT Total Time (min): 23 min    Billable Minutes:Therapeutic Activity 11 min  Neuromuscular Re-education 12 min    OT/GENE: OT          12/17/2021

## 2021-12-18 LAB
ALBUMIN SERPL BCP-MCNC: 3.7 G/DL (ref 3.5–5.2)
ALLENS TEST: ABNORMAL
ALLENS TEST: ABNORMAL
ALP SERPL-CCNC: 51 U/L (ref 55–135)
ALT SERPL W/O P-5'-P-CCNC: 19 U/L (ref 10–44)
ANION GAP SERPL CALC-SCNC: 11 MMOL/L (ref 8–16)
ANISOCYTOSIS BLD QL SMEAR: SLIGHT
AST SERPL-CCNC: 19 U/L (ref 10–40)
BASOPHILS # BLD AUTO: 0.01 K/UL (ref 0–0.2)
BASOPHILS NFR BLD: 0.1 % (ref 0–1.9)
BILIRUB SERPL-MCNC: 1.7 MG/DL (ref 0.1–1)
BUN SERPL-MCNC: 44 MG/DL (ref 8–23)
BURR CELLS BLD QL SMEAR: ABNORMAL
CALCIUM SERPL-MCNC: 9.4 MG/DL (ref 8.7–10.5)
CHLORIDE SERPL-SCNC: 107 MMOL/L (ref 95–110)
CO2 SERPL-SCNC: 30 MMOL/L (ref 23–29)
CREAT SERPL-MCNC: 1.2 MG/DL (ref 0.5–1.4)
DELSYS: ABNORMAL
DELSYS: ABNORMAL
DIFFERENTIAL METHOD: ABNORMAL
EOSINOPHIL # BLD AUTO: 0 K/UL (ref 0–0.5)
EOSINOPHIL NFR BLD: 0 % (ref 0–8)
ERYTHROCYTE [DISTWIDTH] IN BLOOD BY AUTOMATED COUNT: 16.1 % (ref 11.5–14.5)
ERYTHROCYTE [SEDIMENTATION RATE] IN BLOOD BY WESTERGREN METHOD: 18 MM/H
EST. GFR  (AFRICAN AMERICAN): 48 ML/MIN/1.73 M^2
EST. GFR  (NON AFRICAN AMERICAN): 41.6 ML/MIN/1.73 M^2
FLOW: 3
GLUCOSE SERPL-MCNC: 159 MG/DL (ref 70–110)
HCO3 UR-SCNC: 28.2 MMOL/L (ref 24–28)
HCO3 UR-SCNC: 31.9 MMOL/L (ref 24–28)
HCT VFR BLD AUTO: 36.7 % (ref 37–48.5)
HGB BLD-MCNC: 11.2 G/DL (ref 12–16)
IMM GRANULOCYTES # BLD AUTO: 0.07 K/UL (ref 0–0.04)
IMM GRANULOCYTES NFR BLD AUTO: 0.6 % (ref 0–0.5)
LYMPHOCYTES # BLD AUTO: 1 K/UL (ref 1–4.8)
LYMPHOCYTES NFR BLD: 7.6 % (ref 18–48)
MAGNESIUM SERPL-MCNC: 2.4 MG/DL (ref 1.6–2.6)
MCH RBC QN AUTO: 28.6 PG (ref 27–31)
MCHC RBC AUTO-ENTMCNC: 30.5 G/DL (ref 32–36)
MCV RBC AUTO: 94 FL (ref 82–98)
MODE: ABNORMAL
MONOCYTES # BLD AUTO: 2.2 K/UL (ref 0.3–1)
MONOCYTES NFR BLD: 17.2 % (ref 4–15)
NEUTROPHILS # BLD AUTO: 9.5 K/UL (ref 1.8–7.7)
NEUTROPHILS NFR BLD: 74.5 % (ref 38–73)
NRBC BLD-RTO: 0 /100 WBC
OVALOCYTES BLD QL SMEAR: ABNORMAL
PCO2 BLDA: 44.3 MMHG (ref 35–45)
PCO2 BLDA: 47.2 MMHG (ref 35–45)
PH SMN: 7.41 [PH] (ref 7.35–7.45)
PH SMN: 7.44 [PH] (ref 7.35–7.45)
PHOSPHATE SERPL-MCNC: 3.8 MG/DL (ref 2.7–4.5)
PLATELET # BLD AUTO: 118 K/UL (ref 150–450)
PLATELET BLD QL SMEAR: ABNORMAL
PMV BLD AUTO: 10.2 FL (ref 9.2–12.9)
PO2 BLDA: 124 MMHG (ref 80–100)
PO2 BLDA: 80 MMHG (ref 80–100)
POC BE: 4 MMOL/L
POC BE: 8 MMOL/L
POC SATURATED O2: 96 % (ref 95–100)
POC SATURATED O2: 99 % (ref 95–100)
POC TCO2: 30 MMOL/L (ref 23–27)
POC TCO2: 33 MMOL/L (ref 23–27)
POCT GLUCOSE: 198 MG/DL (ref 70–110)
POIKILOCYTOSIS BLD QL SMEAR: ABNORMAL
POTASSIUM SERPL-SCNC: 4.2 MMOL/L (ref 3.5–5.1)
PROT SERPL-MCNC: 5.7 G/DL (ref 6–8.4)
RBC # BLD AUTO: 3.91 M/UL (ref 4–5.4)
SAMPLE: ABNORMAL
SAMPLE: ABNORMAL
SCHISTOCYTES BLD QL SMEAR: ABNORMAL
SITE: ABNORMAL
SITE: ABNORMAL
SODIUM SERPL-SCNC: 148 MMOL/L (ref 136–145)
SP02: 97
VERBAL RESULT READBACK PERFORMED: YES
WBC # BLD AUTO: 12.68 K/UL (ref 3.9–12.7)

## 2021-12-18 PROCEDURE — 25000003 PHARM REV CODE 250: Performed by: PSYCHIATRY & NEUROLOGY

## 2021-12-18 PROCEDURE — 99291 CRITICAL CARE FIRST HOUR: CPT | Mod: ,,, | Performed by: PSYCHIATRY & NEUROLOGY

## 2021-12-18 PROCEDURE — 25000003 PHARM REV CODE 250: Performed by: NURSE PRACTITIONER

## 2021-12-18 PROCEDURE — 82803 BLOOD GASES ANY COMBINATION: CPT

## 2021-12-18 PROCEDURE — 94668 MNPJ CHEST WALL SBSQ: CPT

## 2021-12-18 PROCEDURE — 27000221 HC OXYGEN, UP TO 24 HOURS

## 2021-12-18 PROCEDURE — 95714 VEEG EA 12-26 HR UNMNTR: CPT

## 2021-12-18 PROCEDURE — 31720 CLEARANCE OF AIRWAYS: CPT

## 2021-12-18 PROCEDURE — 25000242 PHARM REV CODE 250 ALT 637 W/ HCPCS

## 2021-12-18 PROCEDURE — 85025 COMPLETE CBC W/AUTO DIFF WBC: CPT | Performed by: PSYCHIATRY & NEUROLOGY

## 2021-12-18 PROCEDURE — 63600175 PHARM REV CODE 636 W HCPCS: Performed by: NURSE PRACTITIONER

## 2021-12-18 PROCEDURE — 94761 N-INVAS EAR/PLS OXIMETRY MLT: CPT

## 2021-12-18 PROCEDURE — 83735 ASSAY OF MAGNESIUM: CPT | Performed by: PSYCHIATRY & NEUROLOGY

## 2021-12-18 PROCEDURE — 94640 AIRWAY INHALATION TREATMENT: CPT

## 2021-12-18 PROCEDURE — 37799 UNLISTED PX VASCULAR SURGERY: CPT

## 2021-12-18 PROCEDURE — 63600175 PHARM REV CODE 636 W HCPCS: Performed by: STUDENT IN AN ORGANIZED HEALTH CARE EDUCATION/TRAINING PROGRAM

## 2021-12-18 PROCEDURE — 80053 COMPREHEN METABOLIC PANEL: CPT | Performed by: PSYCHIATRY & NEUROLOGY

## 2021-12-18 PROCEDURE — 84100 ASSAY OF PHOSPHORUS: CPT | Performed by: PSYCHIATRY & NEUROLOGY

## 2021-12-18 PROCEDURE — 99900035 HC TECH TIME PER 15 MIN (STAT)

## 2021-12-18 PROCEDURE — 99900026 HC AIRWAY MAINTENANCE (STAT)

## 2021-12-18 PROCEDURE — A4216 STERILE WATER/SALINE, 10 ML: HCPCS | Performed by: PSYCHIATRY & NEUROLOGY

## 2021-12-18 PROCEDURE — 99291 PR CRITICAL CARE, E/M 30-74 MINUTES: ICD-10-PCS | Mod: ,,, | Performed by: PSYCHIATRY & NEUROLOGY

## 2021-12-18 PROCEDURE — 95720 EEG PHY/QHP EA INCR W/VEEG: CPT | Mod: ,,, | Performed by: PSYCHIATRY & NEUROLOGY

## 2021-12-18 PROCEDURE — 95700 EEG CONT REC W/VID EEG TECH: CPT

## 2021-12-18 PROCEDURE — 63600175 PHARM REV CODE 636 W HCPCS

## 2021-12-18 PROCEDURE — 95720 PR EEG, W/VIDEO, CONT RECORD, I&R, >12<26 HRS: ICD-10-PCS | Mod: ,,, | Performed by: PSYCHIATRY & NEUROLOGY

## 2021-12-18 PROCEDURE — 25000242 PHARM REV CODE 250 ALT 637 W/ HCPCS: Performed by: PSYCHIATRY & NEUROLOGY

## 2021-12-18 PROCEDURE — 20000000 HC ICU ROOM

## 2021-12-18 PROCEDURE — 63600175 PHARM REV CODE 636 W HCPCS: Performed by: PSYCHIATRY & NEUROLOGY

## 2021-12-18 RX ORDER — MYCOPHENOLATE MOFETIL 200 MG/ML
500 POWDER, FOR SUSPENSION ORAL 2 TIMES DAILY
Status: DISCONTINUED | OUTPATIENT
Start: 2021-12-18 | End: 2021-12-24

## 2021-12-18 RX ADMIN — HYDROXYCHLOROQUINE SULFATE 200 MG: 200 TABLET, FILM COATED ORAL at 08:12

## 2021-12-18 RX ADMIN — HYDROXYCHLOROQUINE SULFATE 200 MG: 200 TABLET, FILM COATED ORAL at 09:12

## 2021-12-18 RX ADMIN — BACLOFEN 10 MG: 10 TABLET ORAL at 08:12

## 2021-12-18 RX ADMIN — MUPIROCIN: 20 OINTMENT TOPICAL at 08:12

## 2021-12-18 RX ADMIN — IPRATROPIUM BROMIDE AND ALBUTEROL SULFATE 3 ML: 2.5; .5 SOLUTION RESPIRATORY (INHALATION) at 11:12

## 2021-12-18 RX ADMIN — ATORVASTATIN CALCIUM 40 MG: 20 TABLET, FILM COATED ORAL at 08:12

## 2021-12-18 RX ADMIN — MYCOPHENOLATE MOFETIL 500 MG: 200 POWDER, FOR SUSPENSION ORAL at 09:12

## 2021-12-18 RX ADMIN — Medication 10 ML: at 12:12

## 2021-12-18 RX ADMIN — SODIUM CHLORIDE, SODIUM LACTATE, POTASSIUM CHLORIDE, AND CALCIUM CHLORIDE 500 ML: .6; .31; .03; .02 INJECTION, SOLUTION INTRAVENOUS at 11:12

## 2021-12-18 RX ADMIN — LEVETIRACETAM 500 MG: 500 INJECTION, SOLUTION INTRAVENOUS at 08:12

## 2021-12-18 RX ADMIN — SODIUM CHLORIDE, SODIUM LACTATE, POTASSIUM CHLORIDE, AND CALCIUM CHLORIDE 500 ML: .6; .31; .03; .02 INJECTION, SOLUTION INTRAVENOUS at 06:12

## 2021-12-18 RX ADMIN — MYCOPHENOLATE MOFETIL 500 MG: 250 CAPSULE ORAL at 08:12

## 2021-12-18 RX ADMIN — Medication 10 ML: at 06:12

## 2021-12-18 RX ADMIN — IPRATROPIUM BROMIDE AND ALBUTEROL SULFATE 3 ML: 2.5; .5 SOLUTION RESPIRATORY (INHALATION) at 03:12

## 2021-12-18 RX ADMIN — HEPARIN SODIUM 5000 UNITS: 5000 INJECTION INTRAVENOUS; SUBCUTANEOUS at 02:12

## 2021-12-18 RX ADMIN — Medication 10 ML: at 02:12

## 2021-12-18 RX ADMIN — ACETYLCYSTEINE 4 ML: 100 INHALANT RESPIRATORY (INHALATION) at 07:12

## 2021-12-18 RX ADMIN — IPRATROPIUM BROMIDE AND ALBUTEROL SULFATE 3 ML: 2.5; .5 SOLUTION RESPIRATORY (INHALATION) at 08:12

## 2021-12-18 RX ADMIN — AMLODIPINE BESYLATE 10 MG: 10 TABLET ORAL at 08:12

## 2021-12-18 RX ADMIN — MONTELUKAST 10 MG: 10 TABLET, FILM COATED ORAL at 08:12

## 2021-12-18 RX ADMIN — MUPIROCIN: 20 OINTMENT TOPICAL at 09:12

## 2021-12-18 RX ADMIN — METHYLPREDNISOLONE SODIUM SUCCINATE 20 MG: 40 INJECTION, POWDER, FOR SOLUTION INTRAMUSCULAR; INTRAVENOUS at 08:12

## 2021-12-18 RX ADMIN — IPRATROPIUM BROMIDE AND ALBUTEROL SULFATE 3 ML: 2.5; .5 SOLUTION RESPIRATORY (INHALATION) at 07:12

## 2021-12-18 RX ADMIN — BACLOFEN 10 MG: 10 TABLET ORAL at 02:12

## 2021-12-18 RX ADMIN — HEPARIN SODIUM 5000 UNITS: 5000 INJECTION INTRAVENOUS; SUBCUTANEOUS at 05:12

## 2021-12-18 RX ADMIN — SENNOSIDES AND DOCUSATE SODIUM 1 TABLET: 50; 8.6 TABLET ORAL at 08:12

## 2021-12-18 RX ADMIN — ACETYLCYSTEINE 4 ML: 100 INHALANT RESPIRATORY (INHALATION) at 03:12

## 2021-12-18 RX ADMIN — HEPARIN SODIUM 5000 UNITS: 5000 INJECTION INTRAVENOUS; SUBCUTANEOUS at 09:12

## 2021-12-18 RX ADMIN — ACETYLCYSTEINE 4 ML: 100 INHALANT RESPIRATORY (INHALATION) at 08:12

## 2021-12-18 NOTE — PROGRESS NOTES
[facility]  Neurocritical Care  Progress Note    Admit Date: 12/13/2021  Service Date: 12/18/2021  Length of Stay: 5    Subjective:     Chief Complaint: Acute subdural hematoma    History of Present Illness:   The patient is an 86-year-old female with past medical history of TIA, RA and Afib of Eliquis admitted to [facility] with Right SDH.  She was found on the floor this morning only responding to painful stimuli.  Patient occasionally says her name.GCS 12 noted for EMS.  PCC administered. She had a mechanical fall out of bed on Thursday hitting her head and has been complaining of intermittent headaches since. Her last dose of eliquis was on 12/11. Pending repeat CT head. NSGY consulted. In addition patient was found to be hyponatremic at 118-central line placed and hypertonic saline infusion initiated. Pending repeat CT head. Patient admitted to [facility] for close monitoring and higher level of care.       Hospital Course: 12/13/2021: patient admitted to [facility] s/p fall on 12/11 on Eliquis  12/14/2021 wheezing, prolonged expiratory phase.   12/15/2021 copious respiratory secretions. Remains encephalopathic. Review EEG  12/16/2021 improved respiratory secretions. D/c nasal tracheal airway if minimal secretions.   12/17/2021 restless.   12/18/2021 lethargic, rising BUN/Cr ratio. Fluid resuscitation. Received flumazenil for BDZ       Review of Symptoms: encephalopathic cannot participate    Medications:  Continuous Infusions:    Scheduled Meds:   acetylcysteine 100 mg/ml (10%)  4 mL Nebulization TID WAKE    albuterol-ipratropium  3 mL Nebulization Q4H    amLODIPine  10 mg Per NG tube Daily    atorvastatin  40 mg Per NG tube Daily    baclofen  10 mg Per NG tube TID    heparin (porcine)  5,000 Units Subcutaneous Q8H    hydrOXYchloroQUINE  200 mg Per NG tube BID    levetiracetam IV  500 mg Intravenous Q12H    methylPREDNISolone sodium succinate  20 mg Intravenous Daily    montelukast  10 mg Per NG tube  Daily    mupirocin   Nasal BID    mycophenolate  500 mg Oral BID    senna-docusate 8.6-50 mg  1 tablet Per NG tube Daily    sodium chloride 0.9%  10 mL Intravenous Q6H     PRN Meds:.acetaminophen, albuterol-ipratropium, calcium gluconate IVPB, calcium gluconate IVPB, calcium gluconate IVPB, labetalol, magnesium sulfate IVPB, magnesium sulfate IVPB, metoprolol, ondansetron, potassium chloride in water **AND** potassium chloride in water **AND** potassium chloride in water, Flushing PICC Protocol **AND** sodium chloride 0.9% **AND** sodium chloride 0.9%    OBJECTIVE:   Vital Signs (Most Recent):   Temp: 98 °F (36.7 °C) (12/18/21 0701)  Pulse: 101 (12/18/21 0740)  Resp: (!) 25 (12/18/21 0740)  BP: (!) 146/68 (12/18/21 0701)  SpO2: 97 % (12/18/21 0740)    Vital Signs (24h Range):   Temp:  [97.2 °F (36.2 °C)-98.9 °F (37.2 °C)] 98 °F (36.7 °C)  Pulse:  [] 101  Resp:  [18-40] 25  SpO2:  [95 %-100 %] 97 %  BP: (117-156)/(67-90) 146/68  Arterial Line BP: (113-174)/(64-87) 146/68    ICP/CPP (Last 24h):   CO:  [3.2 L/min-4 L/min] 3.4 L/min  CI:  [1.8 L/min/m2-2.3 L/min/m2] 2 L/min/m2    I & O (Last 24h):     Intake/Output Summary (Last 24 hours) at 12/18/2021 0945  Last data filed at 12/18/2021 0400  Gross per 24 hour   Intake 380 ml   Output 365 ml   Net 15 ml     Physical Exam: unchanged from previous day  GA: awake, comfortable, no acute distress.   HEENT: No scleral icterus or JVD. Neck supple  Pulmonary: Air entry equal to auscultation A/P/L. Wheezing +, crackles +  Cardiac: RRR, S1 & S2 w/o rubs/murmurs/gallops.   Abdominal: soft, non-tender, bowel sounds present x 4. No appreciable hepatosplenomegaly.  Skin: No jaundice, rashes, or visible lesions.  Neuro:  --Mental Status:  awake, follows one step commands. Spontaneous eye opening. Tracks visually (particularly to family at bedside).   --Pupils 3.5 mm, PERRL.   --Corneal reflex not done in this arousable patient, gag, cough intact.  Moves bilateral ue/le  symmetrically  MSK:  No edema in UE and LE    Vent Data:        Lines/Drains/Airway:        Nasopharyngeal Airway (Active)   Measured At Nare 12/17/21 0428   Secured Location Left 12/17/21 0428   Site Condition Cool;Dry 12/17/21 0428      PICC Double Lumen 12/15/21 1616 right basilic (Active)   Verification by X-ray Yes 12/16/21 1905   Site Assessment No warmth;No swelling;No redness;No drainage 12/17/21 0701   Extremity Assessment Distal to IV No swelling;No redness;No abnormal discoloration 12/17/21 0701   Line Securement Device Secured with sutureless device 12/17/21 0701   Dressing Type Biopatch in place;Transparent (Tegaderm) 12/17/21 0701   Dressing Status Clean;Dry;Intact 12/17/21 0701   Dressing Intervention Integrity maintained 12/17/21 0701   Date on Dressing 12/15/21 12/17/21 0701   Dressing Due to be Changed 12/22/21 12/17/21 0701   Left Lumen Patency/Care Infusing 12/17/21 0701   Right Lumen Patency/Care Infusing 12/17/21 0701   Current Insertion Depth (cm) 33 cm 12/15/21 1616   Current Exposed Catheter (cm) 0 cm 12/15/21 1616   Extremity Circumference (cm) 33 cm 12/15/21 1616   Waveform Normal 12/15/21 1616   Line Necessity Review Poor venous access 12/17/21 0701      Arterial Line 12/13/21 1632 Right Radial (Active)   Site Assessment Clean;Dry;Intact;No redness;No swelling 12/17/21 0701   Line Status Pulsatile blood flow 12/17/21 0701   Art Line Waveform Appropriate;Square wave test performed;Whip 12/17/21 0701   Arterial Line Interventions Zeroed and calibrated;Leveled 12/17/21 0701   Color/Movement/Sensation Capillary refill less than 3 sec 12/17/21 0701   Dressing Type Biopatch in place;Transparent (Tegaderm) 12/17/21 0701   Dressing Status Clean;Dry;Intact 12/17/21 0701   Dressing Intervention Integrity maintained 12/17/21 0701   Dressing Change Due 12/20/21 12/17/21 0701   Tubing Change Due 12/17/21 12/17/21 0701           Urethral Catheter 12/14/21 1031 (Active)   Site Assessment Clean;Intact  "12/17/21 0701   Collection Container Urimeter 12/17/21 0701   Securement Method secured to top of thigh w/ adhesive device 12/17/21 0701   Catheter Care Performed yes 12/17/21 0701   Reason for Continuing Urinary Catheterization Critically ill in ICU and requiring hourly monitoring of intake/output 12/17/21 0701   CAUTI Prevention Bundle StatLock in place w 1" slack;Intact seal between catheter & drainage tubing;Drainage bag/urimeter off the floor;Sheeting clip in use;No dependent loops or kinks;Drainage bag/urimeter not overfilled (<2/3 full);Drainage bag/urimeter below bladder 12/17/21 0701   Output (mL) 10 mL 12/17/21 0605         Nutrition/Tube Feeds (if NPO state why): npo, aspiration precautions     Labs:  ABG:   Recent Labs   Lab 12/18/21  0500   PH 7.438   PO2 80   PCO2 47.2*   HCO3 31.9*   POCSATURATED 96   BE 8     BMP:  Recent Labs   Lab 12/18/21  0242   *   K 4.2      CO2 30*   BUN 44*   CREATININE 1.2   *   MG 2.4   PHOS 3.8     LFT:   Lab Results   Component Value Date    AST 19 12/18/2021    ALT 19 12/18/2021    ALKPHOS 51 (L) 12/18/2021    BILITOT 1.7 (H) 12/18/2021    ALBUMIN 3.7 12/18/2021    PROT 5.7 (L) 12/18/2021     CBC:   Lab Results   Component Value Date    WBC 12.68 12/18/2021    HGB 11.2 (L) 12/18/2021    HCT 36.7 (L) 12/18/2021    MCV 94 12/18/2021     (L) 12/18/2021     Microbiology x 7d:   Microbiology Results (last 7 days)     ** No results found for the last 168 hours. **        Imaging:  CXR 12/15 - improved interstitial edema. Lines in place. Widened mediastinum (POA)  I personally reviewed the above image.  Today I independently reviewed pertinent medications, lines/drains/airways, imaging, cardiology results, laboratory results, microbiology results, notably:   ASSESSMENT/PLAN:     Active Hospital Problems    Diagnosis    *Acute subdural hematoma    Stroke    Hyponatremia    Age-related physical debility    Leukocytosis    RA (rheumatoid arthritis) "      Neuro:   SDH with brain compression  keppra for seizure prophylaxis  Acute encephalopathy likely multifactorial  Received flumazenil for ativan reversal.   D/c zyprexa.   Consider EEG    Pulmonary:   Solumedrol (conversion from home regimen of prednisone)  Duonebs/3% nacl/mucomyst     Cardiac:   flotrac for advanced hemodynamic monitoiring  SVV >12, tachycardia - volume contraction. Fluid bolus as below  SBP goal <160mmhg    Renal:    Decreased urine output in last 24hrs.   BUN/Cr >20  Hypernatremia - likely dehydrated. Repeat bolus 500ml- 1L LR  FWF 500ml x1 then 150cc q4hrly    ID:   Afebrile, normal wbc  Will monitor    Hem/Onc:   Transfuse for Hb <7 or 8 if associated with hypotension  plt count slight down trend but stable    Endocrine:    BS stable    Fluids/Electrolytes/Nutrition/GI:   Replete lytes as needed  Swallow eval today  Lines:  Art +  CVC +  ETT NA  Hill + d/c in 24hrs  NG NA  PEG NA    Proph:  DVT:SCD/heparin  Constipation:  Last output:bowel regimen as needed  PUP: NA  VAP: NA    Family (daughters) updated at bedside    Uninterrupted Critical Care/Counseling Time (not including procedures):: 33 mins    Juan Irwin MD, FNCS  Neurocritical care attending    Full Code    Juan Irwin MD  Neurocritical Care  [facility] - Neuro Critical [facility]

## 2021-12-18 NOTE — PLAN OF CARE
[facility] Care Plan    POC reviewed with Selma Lux and family at 1400. Pt dysarthric unable to verbalize understanding. Daughter at the bedside, verbalized understanding. Questions and concerns addressed. No acute events today. Pt progressing toward goals. Will continue to monitor. See below and flowsheets for full assessment and VS info.     - PT very drowsy throughout the shift. Opening eyes to pain, with GCS of 9. Following commands intermittently.  Pt wi L sided weakness. YOANA Gama with NCC team notified. Ct of head ordered and completed.   -TF held per order.   - 500 ml of LR followed by Q4 150ml water boluses admin. Per order.   -PT on continues EEG monitoring.       Neuro:  [place] Coma Scale  Best Eye Response: 2-->(E2) to pain  Best Motor Response: 5-->(M5) localizes pain  Best Verbal Response: 2-->(V2) incomprehensible speech  [place] Coma Scale Score: 9  Assessment Qualifiers: no eye obstruction present,patient not sedated/intubated  Pupil PERRLA: yes     24 hr Temp:  [97.2 °F (36.2 °C)-98.6 °F (37 °C)]     CV:   Rhythm: normal sinus rhythm  BP goals:   SBP < 160  MAP > 65    Resp:   O2 Device (Oxygen Therapy): nasal cannula  Oxygen Concentration (%): 28    Plan: N/A    GI/:  SCOOBY Total Score: 0  Diet/Nutrition Received: tube feeding  Last Bowel Movement: 12/13/21  Voiding Characteristics: ureteral catheter    Intake/Output Summary (Last 24 hours) at 12/18/2021 1740  Last data filed at 12/18/2021 1501  Gross per 24 hour   Intake 1340 ml   Output 465 ml   Net 875 ml     Unmeasured Output  Urine Occurrence: 1  Stool Occurrence: 0  Pad Count: 1    Labs/Accuchecks:  Recent Labs   Lab 12/18/21  0242   WBC 12.68   RBC 3.91*   HGB 11.2*   HCT 36.7*   *      Recent Labs   Lab 12/18/21  0242   *   K 4.2   CO2 30*      BUN 44*   CREATININE 1.2   ALKPHOS 51*   ALT 19   AST 19   BILITOT 1.7*      Recent Labs   Lab 12/13/21  1121   INR 1.0   APTT 28.6      Recent Labs   Lab 12/13/21  1055           Electrolytes: N/A - electrolytes WDL  Accuchecks: Q6H    Gtts:       LDA/Wounds:  Lines/Drains/Airways       Peripherally Inserted Central Catheter Line              PICC Double Lumen 12/15/21 1616 right basilic 3 days              Drain                   Urethral Catheter 12/14/21 1031 4 days         NG/OG Tube 12/17/21 1405 Left nostril 1 day              Arterial Line              Arterial Line 12/13/21 1632 Right Radial 5 days              Peripheral Intravenous Line                   Peripheral IV - Single Lumen 12/13/21 1453 22 G Anterior;Left Wrist 5 days                  Wounds: No  Wound care consulted: No

## 2021-12-18 NOTE — PROGRESS NOTES
[facility]  Neurosurgery  Progress Note    Subjective:     History of Present Illness: 86-year-old female with past medical history of TIA comes to the emergency department for AMS.  She was found on the floor about 1 hour ago only responding to painful stimuli.  Patient occasionally says her name.GCS 12 noted for EMS.  Patient does take blood thinners. She had a mechanical fall out of bed on Thursday hitting her head and has been complaining of intermittent headaches since. Her last dose of eliquis was on 12/11. NSGY consulted for CTH with SDH      Post-Op Info:  * No surgery found *         Interval history:  E3V1M6 FC on right LE, wiggles toes; not on left LE. Localizes BUE, some movements of left arm      Vitals:    12/18/21 0200   BP:    Pulse: 92   Resp: (!) 24   Temp:            Review of Systems    Objective:     Weight: 67 kg (147 lb 11.3 oz)  Body mass index is 25.35 kg/m².  Vital Signs (Most Recent):  Temp: 98.6 °F (37 °C) (12/18/21 0000)  Pulse: 92 (12/18/21 0200)  Resp: (!) 24 (12/18/21 0200)  BP: (!) 146/90 (12/17/21 2000)  SpO2: 100 % (12/18/21 0200) Vital Signs (24h Range):  Temp:  [97.2 °F (36.2 °C)-99.1 °F (37.3 °C)] 98.6 °F (37 °C)  Pulse:  [] 92  Resp:  [18-40] 24  SpO2:  [95 %-100 %] 100 %  BP: (124-168)/() 146/90  Arterial Line BP: (113-174)/(65-87) 133/69                   Female External Urinary Catheter 12/13/21 1424 (Active)       Neuro exam:    E3V1M6 today.  AAOx2, slowed mentation  PERRL  CN grossly intact  FC x2 wiggling toes on right not on left; localizes BUE  On EEG      Significant Labs:  Recent Labs   Lab 12/16/21  1209 12/16/21  1721 12/17/21  0252   *  --  115*     --  148*   K 4.0 4.2 3.9     --  107   CO2 23  --  24   BUN 20  --  30*   CREATININE 0.7  --  0.9   CALCIUM 9.3  --  10.1   MG  --   --  2.2     Recent Labs   Lab 12/17/21  0252 12/17/21  0429   WBC 14.55*  --    HGB 11.7*  --    HCT 36.9* 36     --      No  results for input(s): LABPT, INR, APTT in the last 48 hours.  Microbiology Results (last 7 days)     ** No results found for the last 168 hours. **        All pertinent labs from the last 24 hours have been reviewed.    Significant Diagnostics:  I have reviewed all pertinent imaging results/findings within the past 24 hours.        Assessment/Plan:     * Acute subdural hematoma  86F with on eliquis s/p head trauma 1 week prior now with small aSDH and AMS.     No acute events. Interval imaprovement in exam. E4V4M6.      --admitted to NCC  --CTH: R temp/paretial aSDH 5-6mm thickness, 3-5mm MLS, stable on repeat  --holding eliquis  --EEG pending, f/u   --keppra, AED per NCC  --SBP<150, HOB > 30  --Na >135, greatly improved, now 144 off hypertonics.   --ok for SQH  --no acute neurosurgical intervention at this time  --rest of care per NCC  --CT head minimal increase in acute on chronic sdh  --continue to follow    --We will continue to follow.     --Please page NSGY immediately for any changes in neuro status        Emanuel Cantu MD  Neurosurgery  [facility]

## 2021-12-18 NOTE — SUBJECTIVE & OBJECTIVE
Interval history:  E3V1M6 FC on right LE, wiggles toes; not on left LE. Localizes BUE, some movements of left arm      Vitals:    12/18/21 0200   BP:    Pulse: 92   Resp: (!) 24   Temp:            Review of Systems    Objective:     Weight: 67 kg (147 lb 11.3 oz)  Body mass index is 25.35 kg/m².  Vital Signs (Most Recent):  Temp: 98.6 °F (37 °C) (12/18/21 0000)  Pulse: 92 (12/18/21 0200)  Resp: (!) 24 (12/18/21 0200)  BP: (!) 146/90 (12/17/21 2000)  SpO2: 100 % (12/18/21 0200) Vital Signs (24h Range):  Temp:  [97.2 °F (36.2 °C)-99.1 °F (37.3 °C)] 98.6 °F (37 °C)  Pulse:  [] 92  Resp:  [18-40] 24  SpO2:  [95 %-100 %] 100 %  BP: (124-168)/() 146/90  Arterial Line BP: (113-174)/(65-87) 133/69                   Female External Urinary Catheter 12/13/21 1424 (Active)       Neuro exam:    E3V1M6 today.  AAOx2, slowed mentation  PERRL  CN grossly intact  FC x2 wiggling toes on right not on left; localizes BUE  On EEG      Significant Labs:  Recent Labs   Lab 12/16/21  1209 12/16/21  1721 12/17/21  0252   *  --  115*     --  148*   K 4.0 4.2 3.9     --  107   CO2 23  --  24   BUN 20  --  30*   CREATININE 0.7  --  0.9   CALCIUM 9.3  --  10.1   MG  --   --  2.2     Recent Labs   Lab 12/17/21  0252 12/17/21  0429   WBC 14.55*  --    HGB 11.7*  --    HCT 36.9* 36     --      No results for input(s): LABPT, INR, APTT in the last 48 hours.  Microbiology Results (last 7 days)     ** No results found for the last 168 hours. **        All pertinent labs from the last 24 hours have been reviewed.    Significant Diagnostics:  I have reviewed all pertinent imaging results/findings within the past 24 hours.

## 2021-12-18 NOTE — NURSING
17:07 pt transported to CT. Transported via bed. On 3L NC . Ambu bag at the bedside. On continues/portable monitoring.     17:28 Pt transported back to room 9083. CT completed, tolerated well. Will continue to monitor.

## 2021-12-18 NOTE — ASSESSMENT & PLAN NOTE
86F with on eliquis s/p head trauma 1 week prior now with small aSDH and AMS.     No acute events. Interval imaprovement in exam. E4V4M6.      --admitted to NCC  --CTH: R temp/paretial aSDH 5-6mm thickness, 3-5mm MLS, stable on repeat  --holding eliquis  --EEG pending, f/u   --keppra, AED per NCC  --SBP<150, HOB > 30  --Na >135, greatly improved, now 144 off hypertonics.   --ok for SQH  --no acute neurosurgical intervention at this time  --rest of care per NCC  --CT head minimal increase in acute on chronic sdh  --continue to follow    --We will continue to follow.     --Please page NSGY immediately for any changes in neuro status

## 2021-12-18 NOTE — PROCEDURES
EEG REPORT      Selma Lux  [id number]  1935    DATE OF SERVICE: 12/14/2021     -1    METHODOLOGY      Extended electroencephalographic recording is made while the patient is ambulatory and continuing normal daily activities.  Electrodes are placed according to the International 10-20 placement system and included T1 and T2 electrode placement.  Twenty four (24) channels of digital signal (sampling rate of 512/sec) was simultaneously recorded from the scalp including EKG and eye monitors.  Recording band pass was 0.1 to 100 hz and all data was stored digitally on the recorder.  The patient is instructed to press an event button when clinical symptoms occur and write the symptoms into a diary. Activation procedures which include photic stimulation, hyperventilation and instructing patients to perform simple task are done in selected patients.        The EEG is displayed on a monitor screen and can be reformatted into different montages for evaluation.  The entire recoding is submitted for computer assisted analysis to detect spike and electrographic seizure activity.  The entire recording is visually reviewed and the times identified by computer analysis as being spikes or seizures are reviewed again.  Compresses spectral analysis (CSA) is also performed on the activity recorded from each individual channel.  This is displayed as a power display of frequencies from 0 to 30 Hz over time.   The CSA analysis is done and displayed continuously.  This is reviewed for asymmetries in power between homologous areas of the scalp and for presence of changes in power which canbe seen when seizures occur.  Sections of suspected abnormalities on the CSA is then compared with the original EEG recording.  .     [employer] software was also utilized in the review of this study.  This software suite analyzes the EEG recording in multiple domains.  Coherence and rhythmicity is computed to identify EEG sections which may  contain organized seizures.  Each channel undergoes analysis to detect presence of spike and sharp waves which have special and morphological characteristic of epileptic activity.  The routine EEG recording is converted from spacial into frequency domain.  This is then displayed comparing homologous areas to identify areas of significant asymmetry.  Algorithm to identify non-cortically generated artifact is used to separate eye movement, EMG and other artifact from the EEG     Recording Times  Start on 12/14/2021  Stop on 12/15/2021    A total of 16:31:52 hours of EEG was recorded.      EEG FINDINGS:  Background activity:   The background rhythm was characterized by generalized polymorphic delta activity with some theta and spindle activity noted over the left hemisphere     Sleep:   Rudimentary sleep spindles, K complexes, but no well staged sleep    Activation procedures:  Eye blinks and reactivity noted    Abnormal activity:   No epileptiform discharges, periodic discharges, lateralized rhythmic delta activity or electrographic seizures were seen.    Event button lexii at 23:16 appears to be accidental    IMPRESSION:   Abnormal EEG due to moderate generalized cerebral dysfunction which is somewhat more pronounced over the right hemisphere.      Michael Garcia MD  Neurology-Epilepsy.  [facility].

## 2021-12-19 LAB
ALBUMIN SERPL BCP-MCNC: 3.6 G/DL (ref 3.5–5.2)
ALP SERPL-CCNC: 54 U/L (ref 55–135)
ALT SERPL W/O P-5'-P-CCNC: 25 U/L (ref 10–44)
ANION GAP SERPL CALC-SCNC: 8 MMOL/L (ref 8–16)
AST SERPL-CCNC: 19 U/L (ref 10–40)
BILIRUB SERPL-MCNC: 1.8 MG/DL (ref 0.1–1)
BUN SERPL-MCNC: 40 MG/DL (ref 8–23)
CALCIUM SERPL-MCNC: 9.4 MG/DL (ref 8.7–10.5)
CHLORIDE SERPL-SCNC: 110 MMOL/L (ref 95–110)
CO2 SERPL-SCNC: 31 MMOL/L (ref 23–29)
CREAT SERPL-MCNC: 1 MG/DL (ref 0.5–1.4)
EST. GFR  (AFRICAN AMERICAN): 59.8 ML/MIN/1.73 M^2
EST. GFR  (NON AFRICAN AMERICAN): 51.9 ML/MIN/1.73 M^2
GLUCOSE SERPL-MCNC: 99 MG/DL (ref 70–110)
POTASSIUM SERPL-SCNC: 4 MMOL/L (ref 3.5–5.1)
PROT SERPL-MCNC: 5.5 G/DL (ref 6–8.4)
SODIUM SERPL-SCNC: 149 MMOL/L (ref 136–145)

## 2021-12-19 PROCEDURE — 95718 EEG PHYS/QHP 2-12 HR W/VEEG: CPT | Mod: ,,, | Performed by: PSYCHIATRY & NEUROLOGY

## 2021-12-19 PROCEDURE — 99291 CRITICAL CARE FIRST HOUR: CPT | Mod: ,,, | Performed by: PSYCHIATRY & NEUROLOGY

## 2021-12-19 PROCEDURE — 94761 N-INVAS EAR/PLS OXIMETRY MLT: CPT

## 2021-12-19 PROCEDURE — 25000003 PHARM REV CODE 250: Performed by: STUDENT IN AN ORGANIZED HEALTH CARE EDUCATION/TRAINING PROGRAM

## 2021-12-19 PROCEDURE — 25000242 PHARM REV CODE 250 ALT 637 W/ HCPCS: Performed by: PSYCHIATRY & NEUROLOGY

## 2021-12-19 PROCEDURE — 63600175 PHARM REV CODE 636 W HCPCS: Performed by: PSYCHIATRY & NEUROLOGY

## 2021-12-19 PROCEDURE — 25000003 PHARM REV CODE 250: Performed by: NURSE PRACTITIONER

## 2021-12-19 PROCEDURE — 31720 CLEARANCE OF AIRWAYS: CPT

## 2021-12-19 PROCEDURE — A4216 STERILE WATER/SALINE, 10 ML: HCPCS | Performed by: PSYCHIATRY & NEUROLOGY

## 2021-12-19 PROCEDURE — 20000000 HC ICU ROOM

## 2021-12-19 PROCEDURE — 25000003 PHARM REV CODE 250: Performed by: PSYCHIATRY & NEUROLOGY

## 2021-12-19 PROCEDURE — 99233 PR SUBSEQUENT HOSPITAL CARE,LEVL III: ICD-10-PCS | Mod: ,,, | Performed by: STUDENT IN AN ORGANIZED HEALTH CARE EDUCATION/TRAINING PROGRAM

## 2021-12-19 PROCEDURE — 94640 AIRWAY INHALATION TREATMENT: CPT

## 2021-12-19 PROCEDURE — 63600175 PHARM REV CODE 636 W HCPCS: Performed by: NURSE PRACTITIONER

## 2021-12-19 PROCEDURE — 99900035 HC TECH TIME PER 15 MIN (STAT)

## 2021-12-19 PROCEDURE — 25000242 PHARM REV CODE 250 ALT 637 W/ HCPCS

## 2021-12-19 PROCEDURE — C9254 INJECTION, LACOSAMIDE: HCPCS | Performed by: NURSE PRACTITIONER

## 2021-12-19 PROCEDURE — 27000221 HC OXYGEN, UP TO 24 HOURS

## 2021-12-19 PROCEDURE — 99233 SBSQ HOSP IP/OBS HIGH 50: CPT | Mod: ,,, | Performed by: STUDENT IN AN ORGANIZED HEALTH CARE EDUCATION/TRAINING PROGRAM

## 2021-12-19 PROCEDURE — 63600175 PHARM REV CODE 636 W HCPCS

## 2021-12-19 PROCEDURE — 80053 COMPREHEN METABOLIC PANEL: CPT | Performed by: NURSE PRACTITIONER

## 2021-12-19 PROCEDURE — 94668 MNPJ CHEST WALL SBSQ: CPT

## 2021-12-19 PROCEDURE — 95718 PR EEG, W/VIDEO, CONT RECORD, I&R, 2-12 HRS: ICD-10-PCS | Mod: ,,, | Performed by: PSYCHIATRY & NEUROLOGY

## 2021-12-19 PROCEDURE — 99291 PR CRITICAL CARE, E/M 30-74 MINUTES: ICD-10-PCS | Mod: ,,, | Performed by: PSYCHIATRY & NEUROLOGY

## 2021-12-19 RX ORDER — LORAZEPAM 2 MG/ML
INJECTION INTRAMUSCULAR
Status: DISCONTINUED
Start: 2021-12-19 | End: 2021-12-20 | Stop reason: WASHOUT

## 2021-12-19 RX ORDER — DEXMEDETOMIDINE HYDROCHLORIDE 4 UG/ML
0-1.4 INJECTION, SOLUTION INTRAVENOUS CONTINUOUS
Status: DISCONTINUED | OUTPATIENT
Start: 2021-12-19 | End: 2021-12-19

## 2021-12-19 RX ADMIN — IPRATROPIUM BROMIDE AND ALBUTEROL SULFATE 3 ML: 2.5; .5 SOLUTION RESPIRATORY (INHALATION) at 12:12

## 2021-12-19 RX ADMIN — METHYLPREDNISOLONE SODIUM SUCCINATE 20 MG: 40 INJECTION, POWDER, FOR SOLUTION INTRAMUSCULAR; INTRAVENOUS at 08:12

## 2021-12-19 RX ADMIN — MUPIROCIN: 20 OINTMENT TOPICAL at 08:12

## 2021-12-19 RX ADMIN — IPRATROPIUM BROMIDE AND ALBUTEROL SULFATE 3 ML: 2.5; .5 SOLUTION RESPIRATORY (INHALATION) at 03:12

## 2021-12-19 RX ADMIN — DEXMEDETOMIDINE HYDROCHLORIDE 0.2 MCG/KG/HR: 4 INJECTION, SOLUTION INTRAVENOUS at 10:12

## 2021-12-19 RX ADMIN — IPRATROPIUM BROMIDE AND ALBUTEROL SULFATE 3 ML: 2.5; .5 SOLUTION RESPIRATORY (INHALATION) at 04:12

## 2021-12-19 RX ADMIN — IPRATROPIUM BROMIDE AND ALBUTEROL SULFATE 3 ML: 2.5; .5 SOLUTION RESPIRATORY (INHALATION) at 08:12

## 2021-12-19 RX ADMIN — IPRATROPIUM BROMIDE AND ALBUTEROL SULFATE 3 ML: 2.5; .5 SOLUTION RESPIRATORY (INHALATION) at 07:12

## 2021-12-19 RX ADMIN — HEPARIN SODIUM 5000 UNITS: 5000 INJECTION INTRAVENOUS; SUBCUTANEOUS at 02:12

## 2021-12-19 RX ADMIN — MYCOPHENOLATE MOFETIL 500 MG: 200 POWDER, FOR SUSPENSION ORAL at 10:12

## 2021-12-19 RX ADMIN — METOROPROLOL TARTRATE 5 MG: 5 INJECTION, SOLUTION INTRAVENOUS at 10:12

## 2021-12-19 RX ADMIN — ATORVASTATIN CALCIUM 40 MG: 20 TABLET, FILM COATED ORAL at 08:12

## 2021-12-19 RX ADMIN — LABETALOL HYDROCHLORIDE 5 MG: 5 INJECTION, SOLUTION INTRAVENOUS at 10:12

## 2021-12-19 RX ADMIN — Medication 10 ML: at 11:12

## 2021-12-19 RX ADMIN — SODIUM CHLORIDE, SODIUM LACTATE, POTASSIUM CHLORIDE, AND CALCIUM CHLORIDE 250 ML: .6; .31; .03; .02 INJECTION, SOLUTION INTRAVENOUS at 02:12

## 2021-12-19 RX ADMIN — Medication 10 ML: at 06:12

## 2021-12-19 RX ADMIN — HYDROXYCHLOROQUINE SULFATE 200 MG: 200 TABLET, FILM COATED ORAL at 08:12

## 2021-12-19 RX ADMIN — HEPARIN SODIUM 5000 UNITS: 5000 INJECTION INTRAVENOUS; SUBCUTANEOUS at 09:12

## 2021-12-19 RX ADMIN — Medication 10 ML: at 12:12

## 2021-12-19 RX ADMIN — Medication 10 ML: at 02:12

## 2021-12-19 RX ADMIN — MONTELUKAST 10 MG: 10 TABLET, FILM COATED ORAL at 08:12

## 2021-12-19 RX ADMIN — SENNOSIDES AND DOCUSATE SODIUM 1 TABLET: 50; 8.6 TABLET ORAL at 08:12

## 2021-12-19 RX ADMIN — SODIUM CHLORIDE 300 MG: 9 INJECTION, SOLUTION INTRAVENOUS at 09:12

## 2021-12-19 RX ADMIN — METOROPROLOL TARTRATE 5 MG: 5 INJECTION, SOLUTION INTRAVENOUS at 06:12

## 2021-12-19 RX ADMIN — ACETYLCYSTEINE 4 ML: 100 INHALANT RESPIRATORY (INHALATION) at 08:12

## 2021-12-19 RX ADMIN — HEPARIN SODIUM 5000 UNITS: 5000 INJECTION INTRAVENOUS; SUBCUTANEOUS at 05:12

## 2021-12-19 RX ADMIN — IPRATROPIUM BROMIDE AND ALBUTEROL SULFATE 3 ML: 2.5; .5 SOLUTION RESPIRATORY (INHALATION) at 11:12

## 2021-12-19 RX ADMIN — HYDROXYCHLOROQUINE SULFATE 200 MG: 200 TABLET, FILM COATED ORAL at 09:12

## 2021-12-19 RX ADMIN — AMLODIPINE BESYLATE 10 MG: 10 TABLET ORAL at 08:12

## 2021-12-19 RX ADMIN — MYCOPHENOLATE MOFETIL 500 MG: 200 POWDER, FOR SUSPENSION ORAL at 09:12

## 2021-12-19 NOTE — PROGRESS NOTES
[facility]  Neurocritical Care  Progress Note    Admit Date: 12/13/2021  Service Date: 12/19/2021  Length of Stay: 6    Subjective:     Chief Complaint: Acute subdural hematoma    History of Present Illness:   The patient is an 86-year-old female with past medical history of TIA, RA and Afib of Eliquis admitted to [facility] with Right SDH.  She was found on the floor this morning only responding to painful stimuli.  Patient occasionally says her name.GCS 12 noted for EMS.  PCC administered. She had a mechanical fall out of bed on Thursday hitting her head and has been complaining of intermittent headaches since. Her last dose of eliquis was on 12/11. Pending repeat CT head. NSGY consulted. In addition patient was found to be hyponatremic at 118-central line placed and hypertonic saline infusion initiated. Pending repeat CT head. Patient admitted to [facility] for close monitoring and higher level of care.       Hospital Course: 12/13/2021: patient admitted to [facility] s/p fall on 12/11 on Eliquis  12/14/2021 wheezing, prolonged expiratory phase.   12/15/2021 copious respiratory secretions. Remains encephalopathic. Review EEG  12/16/2021 improved respiratory secretions. D/c nasal tracheal airway if minimal secretions.   12/17/2021 restless.   12/18/2021 lethargic, rising BUN/Cr ratio. Fluid resuscitation. Received flumazenil for BDZ   12/19/2021 encephalopathy overnight. Work up neg so far. BUN/Cr >20 from volume contraction.       Review of Symptoms: encephalopathic cannot participate    Medications:  Continuous Infusions:    Scheduled Meds:   acetylcysteine 100 mg/ml (10%)  4 mL Nebulization TID WAKE    albuterol-ipratropium  3 mL Nebulization Q4H    amLODIPine  10 mg Per NG tube Daily    atorvastatin  40 mg Per NG tube Daily    heparin (porcine)  5,000 Units Subcutaneous Q8H    hydrOXYchloroQUINE  200 mg Per NG tube BID    methylPREDNISolone sodium succinate  20 mg Intravenous Daily    montelukast  10 mg  Per NG tube Daily    mupirocin   Nasal BID    mycophenolate mofetil  500 mg Per NG tube BID    senna-docusate 8.6-50 mg  1 tablet Per NG tube Daily    sodium chloride 0.9%  10 mL Intravenous Q6H     PRN Meds:.acetaminophen, albuterol-ipratropium, calcium gluconate IVPB, calcium gluconate IVPB, calcium gluconate IVPB, labetalol, magnesium sulfate IVPB, magnesium sulfate IVPB, metoprolol, ondansetron, potassium chloride in water **AND** potassium chloride in water **AND** potassium chloride in water, Flushing PICC Protocol **AND** sodium chloride 0.9% **AND** sodium chloride 0.9%    OBJECTIVE:   Vital Signs (Most Recent):   Temp: 98.3 °F (36.8 °C) (12/19/21 0701)  Pulse: 104 (12/19/21 0720)  Resp: (!) 22 (12/19/21 0720)  BP: (!) 159/77 (12/19/21 0701)  SpO2: 97 % (12/19/21 0720)    Vital Signs (24h Range):   Temp:  [97.7 °F (36.5 °C)-98.6 °F (37 °C)] 98.3 °F (36.8 °C)  Pulse:  [] 104  Resp:  [17-55] 22  SpO2:  [97 %-100 %] 97 %  BP: ()/(51-99) 159/77  Arterial Line BP: (118-149)/(50-88) 124/88    ICP/CPP (Last 24h):   CO:  [3.1 L/min-4.3 L/min] 3.1 L/min  CI:  [1.7 L/min/m2-2.6 L/min/m2] 1.7 L/min/m2    I & O (Last 24h):     Intake/Output Summary (Last 24 hours) at 12/19/2021 0913  Last data filed at 12/19/2021 0611  Gross per 24 hour   Intake 1864.17 ml   Output 720 ml   Net 1144.17 ml     Physical Exam:   GA: drowsy, comfortable, no acute distress.   HEENT: No scleral icterus or JVD. Neck supple  Pulmonary: Air entry equal to auscultation A/P/L. Wheezing +, crackles +  Cardiac: RRR, S1 & S2 w/o rubs/murmurs/gallops.   Abdominal: soft, non-tender, bowel sounds present x 4. No appreciable hepatosplenomegaly.  Skin: No jaundice, rashes, or visible lesions.  Neuro:  --Mental Status:  drowsy, follows one step commands. Able to show two fingers Spontaneous eye opening.   --Pupils 3.5 mm, PERRL.   --Corneal reflex not done in this arousable patient, gag, cough intact.  Decreased movement on the left UE and  LE  MSK:  No edema in UE and LE    Vent Data:        Lines/Drains/Airway:        Nasopharyngeal Airway (Active)   Measured At Nare 12/17/21 0428   Secured Location Left 12/17/21 0428   Site Condition Cool;Dry 12/17/21 0428      PICC Double Lumen 12/15/21 1616 right basilic (Active)   Verification by X-ray Yes 12/16/21 1905   Site Assessment No warmth;No swelling;No redness;No drainage 12/17/21 0701   Extremity Assessment Distal to IV No swelling;No redness;No abnormal discoloration 12/17/21 0701   Line Securement Device Secured with sutureless device 12/17/21 0701   Dressing Type Biopatch in place;Transparent (Tegaderm) 12/17/21 0701   Dressing Status Clean;Dry;Intact 12/17/21 0701   Dressing Intervention Integrity maintained 12/17/21 0701   Date on Dressing 12/15/21 12/17/21 0701   Dressing Due to be Changed 12/22/21 12/17/21 0701   Left Lumen Patency/Care Infusing 12/17/21 0701   Right Lumen Patency/Care Infusing 12/17/21 0701   Current Insertion Depth (cm) 33 cm 12/15/21 1616   Current Exposed Catheter (cm) 0 cm 12/15/21 1616   Extremity Circumference (cm) 33 cm 12/15/21 1616   Waveform Normal 12/15/21 1616   Line Necessity Review Poor venous access 12/17/21 0701      Arterial Line 12/13/21 1632 Right Radial (Active)   Site Assessment Clean;Dry;Intact;No redness;No swelling 12/17/21 0701   Line Status Pulsatile blood flow 12/17/21 0701   Art Line Waveform Appropriate;Square wave test performed;Whip 12/17/21 0701   Arterial Line Interventions Zeroed and calibrated;Leveled 12/17/21 0701   Color/Movement/Sensation Capillary refill less than 3 sec 12/17/21 0701   Dressing Type Biopatch in place;Transparent (Tegaderm) 12/17/21 0701   Dressing Status Clean;Dry;Intact 12/17/21 0701   Dressing Intervention Integrity maintained 12/17/21 0701   Dressing Change Due 12/20/21 12/17/21 0701   Tubing Change Due 12/17/21 12/17/21 0701           Urethral Catheter 12/14/21 1031 (Active)   Site Assessment Clean;Intact 12/17/21 9134  "  Collection Container Urimeter 12/17/21 0701   Securement Method secured to top of thigh w/ adhesive device 12/17/21 0701   Catheter Care Performed yes 12/17/21 0701   Reason for Continuing Urinary Catheterization Critically ill in ICU and requiring hourly monitoring of intake/output 12/17/21 0701   CAUTI Prevention Bundle StatLock in place w 1" slack;Intact seal between catheter & drainage tubing;Drainage bag/urimeter off the floor;Sheeting clip in use;No dependent loops or kinks;Drainage bag/urimeter not overfilled (<2/3 full);Drainage bag/urimeter below bladder 12/17/21 0701   Output (mL) 10 mL 12/17/21 0605         Nutrition/Tube Feeds (if NPO state why): npo, aspiration precautions     Labs:  ABG:   Recent Labs   Lab 12/18/21  1606   PH 7.412   PO2 124*   PCO2 44.3   HCO3 28.2*   POCSATURATED 99   BE 4     BMP:  Recent Labs   Lab 12/19/21  0637   *   K 4.0      CO2 31*   BUN 40*   CREATININE 1.0   GLU 99     LFT:   Lab Results   Component Value Date    AST 19 12/19/2021    ALT 25 12/19/2021    ALKPHOS 54 (L) 12/19/2021    BILITOT 1.8 (H) 12/19/2021    ALBUMIN 3.6 12/19/2021    PROT 5.5 (L) 12/19/2021     CBC:   Lab Results   Component Value Date    WBC 12.68 12/18/2021    HGB 11.2 (L) 12/18/2021    HCT 36.7 (L) 12/18/2021    MCV 94 12/18/2021     (L) 12/18/2021     Microbiology x 7d:   Microbiology Results (last 7 days)     ** No results found for the last 168 hours. **        Imaging:  CXR 12/15 - improved interstitial edema. Lines in place. Widened mediastinum (POA)  I personally reviewed the above image.  Today I independently reviewed pertinent medications, lines/drains/airways, imaging, cardiology results, laboratory results, microbiology results, notably:   ASSESSMENT/PLAN:     Active Hospital Problems    Diagnosis    *Acute subdural hematoma    Stroke    Hyponatremia    Age-related physical debility    Leukocytosis    RA (rheumatoid arthritis)      Neuro:   SDH with brain " compression  keppra for seizure prophylaxis  Acute encephalopathy likely multifactorial  Repeat CT head 12/18 - crowding and mass effect of right hemisphere from overlying SDH.   Plan for MRI brain 12/19 - demonstrated SDH with mass effect. No large territory ischemia to explain neurological decline  Received flumazenil 12/18 for ativan reversal.   D/c keppra and baclofen to encourage arousal  Consider EEG - neg for seizures so far    Pulmonary:   Solumedrol (conversion from home regimen of prednisone)  Duonebs/3% nacl/mucomyst     Cardiac:   SBP goal <160mmhg  EF 65%    Renal:    Decreased urine output in last 24hrs.   BUN/Cr >20 improving following fluid resuscitation  Hypernatremia - likely dehydrated. Repeat bolus 500ml- 1L LR  Repeat FWF 500ml x1 then continue 150cc q4hrly    ID:   Afebrile, normal wbc  Will monitor    Hem/Onc:   Transfuse for Hb <7 or 8 if associated with hypotension  plt count slight down trend but stable    Endocrine:    BS stable    Fluids/Electrolytes/Nutrition/GI:   Replete lytes as needed  Resume tf  Lines:  Art +  CVC +  ETT NA  Hill + for strict I/O  NG +  PEG NA    Proph:  DVT:SCD/heparin  Constipation:  Last output:bowel regimen as needed  PUP: NA  VAP: NA    Family (daughter) updated at bedside today and discussed evolution of her clinical symptoms over the last 48-72hrs. Explained MRI findings, changes in her clinical exam and anticipated clinical trajectory.  we are discussing with neurosurgery team to determine next best clinical intervention.    Uninterrupted Critical Care/Counseling Time (not including procedures):: 64 mins    Juan Irwin MD, [facility]  Neurocritical care attending    Full Code    Juan Irwin MD  Neurocritical Care  [facility]

## 2021-12-19 NOTE — PROCEDURES
EEG REPORT      Selma Lux  [id number]  1935    DATE OF SERVICE: 12/18/2021     -1,1    METHODOLOGY      Extended electroencephalographic recording is made while the patient is ambulatory and continuing normal daily activities.  Electrodes are placed according to the International 10-20 placement system and included T1 and T2 electrode placement.  Twenty four (24) channels of digital signal (sampling rate of 512/sec) was simultaneously recorded from the scalp including EKG and eye monitors.  Recording band pass was 0.1 to 100 hz and all data was stored digitally on the recorder.  The patient is instructed to press an event button when clinical symptoms occur and write the symptoms into a diary. Activation procedures which include photic stimulation, hyperventilation and instructing patients to perform simple task are done in selected patients.        The EEG is displayed on a monitor screen and can be reformatted into different montages for evaluation.  The entire recoding is submitted for computer assisted analysis to detect spike and electrographic seizure activity.  The entire recording is visually reviewed and the times identified by computer analysis as being spikes or seizures are reviewed again.  Compresses spectral analysis (CSA) is also performed on the activity recorded from each individual channel.  This is displayed as a power display of frequencies from 0 to 30 Hz over time.   The CSA analysis is done and displayed continuously.  This is reviewed for asymmetries in power between homologous areas of the scalp and for presence of changes in power which canbe seen when seizures occur.  Sections of suspected abnormalities on the CSA is then compared with the original EEG recording.  .     [employer] software was also utilized in the review of this study.  This software suite analyzes the EEG recording in multiple domains.  Coherence and rhythmicity is computed to identify EEG sections which may  contain organized seizures.  Each channel undergoes analysis to detect presence of spike and sharp waves which have special and morphological characteristic of epileptic activity.  The routine EEG recording is converted from spacial into frequency domain.  This is then displayed comparing homologous areas to identify areas of significant asymmetry.  Algorithm to identify non-cortically generated artifact is used to separate eye movement, EMG and other artifact from the EEG     Recording Times  Start on 12/18/2021  Stop on 12/19/2021    A total of 12:33:05 and 6:51:23 hours of EEG was recorded.      EEG FINDINGS:  Background activity:   The background rhythm was characterized by generalized polymorphic delta activity with some theta and spindle activity noted over the left hemisphere     Sleep:   Rudimentary sleep spindles, K complexes, but no well staged sleep    Activation procedures:  Eye blinks and reactivity noted    Abnormal activity:   No epileptiform discharges, periodic discharges, lateralized rhythmic delta activity or electrographic seizures were seen.    IMPRESSION:   Abnormal EEG due to moderate generalized cerebral dysfunction which is somewhat more pronounced over the right hemisphere.      Michael Garcia MD  Neurology-Epilepsy.  [facility].

## 2021-12-19 NOTE — SUBJECTIVE & OBJECTIVE
Interval history:  AOx1 E3V4M6 FC on right hemobody. Will follow EEG if no seizure do MRI    Vitals:    12/19/21 0720   BP:    Pulse: 104   Resp: (!) 22   Temp:            Review of Systems    Objective:     Weight: 67 kg (147 lb 11.3 oz)  Body mass index is 25.35 kg/m².  Vital Signs (Most Recent):  Temp: 98.3 °F (36.8 °C) (12/19/21 0701)  Pulse: 104 (12/19/21 0720)  Resp: (!) 22 (12/19/21 0720)  BP: (!) 159/77 (12/19/21 0701)  SpO2: 97 % (12/19/21 0720) Vital Signs (24h Range):  Temp:  [97.7 °F (36.5 °C)-98.6 °F (37 °C)] 98.3 °F (36.8 °C)  Pulse:  [] 104  Resp:  [17-55] 22  SpO2:  [97 %-100 %] 97 %  BP: ()/(51-99) 159/77  Arterial Line BP: (118-149)/(50-88) 124/88                   Female External Urinary Catheter 12/13/21 1424 (Active)       Neuro exam:    E3V4M6 today.  AAOx2, slowed mentation  PERRL  CN grossly intact  FC xon right hemibody  Follow  EEG  Do MRI if no activity on eeg      Significant Labs:  Recent Labs   Lab 12/18/21  0242 12/19/21  0637   * 99   * 149*   K 4.2 4.0    110   CO2 30* 31*   BUN 44* 40*   CREATININE 1.2 1.0   CALCIUM 9.4 9.4   MG 2.4  --      Recent Labs   Lab 12/18/21  0242   WBC 12.68   HGB 11.2*   HCT 36.7*   *     No results for input(s): LABPT, INR, APTT in the last 48 hours.  Microbiology Results (last 7 days)     ** No results found for the last 168 hours. **        All pertinent labs from the last 24 hours have been reviewed.    Significant Diagnostics:  I have reviewed all pertinent imaging results/findings within the past 24 hours.

## 2021-12-19 NOTE — ASSESSMENT & PLAN NOTE
86F with on eliquis s/p head trauma 1 week prior now with small aSDH and AMS.     No acute events. Interval imaprovement in exam. E4V4M6.      --admitted to NCC  --CTH: R temp/paretial aSDH 5-6mm thickness, 3-5mm MLS, stable on repeat  --holding eliquis  --EEG pending, f/u   --keppra, AED per NCC  --SBP<150, HOB > 30  --Na >135, greatly improved, now 144 off hypertonics.   --ok for SQH  --no acute neurosurgical intervention at this time  --rest of care per NCC  --CT head minimal increase in acute on chronic sdh  --follow EEG, if no seizure activity, perform MRI    --We will continue to follow.     --Please page NSGY immediately for any changes in neuro status

## 2021-12-19 NOTE — PROCEDURES
EEG REPORT      Selma Lux  [id number]  1935    DATE OF SERVICE: 12/19/2021     -2    METHODOLOGY      Extended electroencephalographic recording is made while the patient is ambulatory and continuing normal daily activities.  Electrodes are placed according to the International 10-20 placement system and included T1 and T2 electrode placement.  Twenty four (24) channels of digital signal (sampling rate of 512/sec) was simultaneously recorded from the scalp including EKG and eye monitors.  Recording band pass was 0.1 to 100 hz and all data was stored digitally on the recorder.  The patient is instructed to press an event button when clinical symptoms occur and write the symptoms into a diary. Activation procedures which include photic stimulation, hyperventilation and instructing patients to perform simple task are done in selected patients.        The EEG is displayed on a monitor screen and can be reformatted into different montages for evaluation.  The entire recoding is submitted for computer assisted analysis to detect spike and electrographic seizure activity.  The entire recording is visually reviewed and the times identified by computer analysis as being spikes or seizures are reviewed again.  Compresses spectral analysis (CSA) is also performed on the activity recorded from each individual channel.  This is displayed as a power display of frequencies from 0 to 30 Hz over time.   The CSA analysis is done and displayed continuously.  This is reviewed for asymmetries in power between homologous areas of the scalp and for presence of changes in power which canbe seen when seizures occur.  Sections of suspected abnormalities on the CSA is then compared with the original EEG recording.  .     STYLIGHT software was also utilized in the review of this study.  This software suite analyzes the EEG recording in multiple domains.  Coherence and rhythmicity is computed to identify EEG sections which may  contain organized seizures.  Each channel undergoes analysis to detect presence of spike and sharp waves which have special and morphological characteristic of epileptic activity.  The routine EEG recording is converted from spacial into frequency domain.  This is then displayed comparing homologous areas to identify areas of significant asymmetry.  Algorithm to identify non-cortically generated artifact is used to separate eye movement, EMG and other artifact from the EEG     Recording Times    A total of 2:37:11 hours of EEG was recorded.      EEG FINDINGS:  Background activity:   The background rhythm was characterized by generalized polymorphic delta activity with some theta and spindle activity noted over the left hemisphere     Sleep:   Rudimentary sleep spindles, K complexes, but no well staged sleep    Activation procedures:  Eye blinks and reactivity noted    Abnormal activity:   No epileptiform discharges, periodic discharges, lateralized rhythmic delta activity or electrographic seizures were seen.    IMPRESSION:   Abnormal EEG due to moderate generalized cerebral dysfunction which is somewhat more pronounced over the right hemisphere.      Michael Garcia MD  Neurology-Epilepsy.  [facility].

## 2021-12-19 NOTE — PROGRESS NOTES
[facility]  Neurosurgery  Progress Note    Subjective:     History of Present Illness: 86-year-old female with past medical history of TIA comes to the emergency department for AMS.  She was found on the floor about 1 hour ago only responding to painful stimuli.  Patient occasionally says her name.GCS 12 noted for EMS.  Patient does take blood thinners. She had a mechanical fall out of bed on Thursday hitting her head and has been complaining of intermittent headaches since. Her last dose of eliquis was on 12/11. NSGY consulted for CTH with SDH      Post-Op Info:  * No surgery found *         Interval history:  AOx1 E3V4M6 FC on right hemobody. Will follow EEG if no seizure do MRI    Vitals:    12/19/21 0720   BP:    Pulse: 104   Resp: (!) 22   Temp:            Review of Systems    Objective:     Weight: 67 kg (147 lb 11.3 oz)  Body mass index is 25.35 kg/m².  Vital Signs (Most Recent):  Temp: 98.3 °F (36.8 °C) (12/19/21 0701)  Pulse: 104 (12/19/21 0720)  Resp: (!) 22 (12/19/21 0720)  BP: (!) 159/77 (12/19/21 0701)  SpO2: 97 % (12/19/21 0720) Vital Signs (24h Range):  Temp:  [97.7 °F (36.5 °C)-98.6 °F (37 °C)] 98.3 °F (36.8 °C)  Pulse:  [] 104  Resp:  [17-55] 22  SpO2:  [97 %-100 %] 97 %  BP: ()/(51-99) 159/77  Arterial Line BP: (118-149)/(50-88) 124/88                   Female External Urinary Catheter 12/13/21 1424 (Active)       Neuro exam:    E3V4M6 today.  AAOx2, slowed mentation  PERRL  CN grossly intact  FC xon right hemibody  Follow  EEG  Do MRI if no activity on eeg      Significant Labs:  Recent Labs   Lab 12/18/21  0242 12/19/21  0637   * 99   * 149*   K 4.2 4.0    110   CO2 30* 31*   BUN 44* 40*   CREATININE 1.2 1.0   CALCIUM 9.4 9.4   MG 2.4  --      Recent Labs   Lab 12/18/21  0242   WBC 12.68   HGB 11.2*   HCT 36.7*   *     No results for input(s): LABPT, INR, APTT in the last 48 hours.  Microbiology Results (last 7 days)     ** No results  found for the last 168 hours. **        All pertinent labs from the last 24 hours have been reviewed.    Significant Diagnostics:  I have reviewed all pertinent imaging results/findings within the past 24 hours.      Assessment/Plan:     * Acute subdural hematoma  86F with on eliquis s/p head trauma 1 week prior now with small aSDH and AMS.     No acute events. Interval imaprovement in exam. E4V4M6.      --admitted to [facility]  --CTH: R temp/paretial aSDH 5-6mm thickness, 3-5mm MLS, stable on repeat  --holding eliquis  --EEG pending, f/u   --keppra, AED per NCC  --SBP<150, HOB > 30  --Na >135, greatly improved, now 144 off hypertonics.   --ok for SQH  --no acute neurosurgical intervention at this time  --rest of care per NCC  --CT head minimal increase in acute on chronic sdh  --follow EEG, if no seizure activity, perform MRI    --We will continue to follow.     --Please page NSGY immediately for any changes in neuro status        Emanuel Cantu MD  Neurosurgery  [facility]

## 2021-12-19 NOTE — PROCEDURES
EEG REPORT      Selma Lux  [id number]  1935    DATE OF SERVICE: 12/15/2021     -2    METHODOLOGY      Extended electroencephalographic recording is made while the patient is ambulatory and continuing normal daily activities.  Electrodes are placed according to the International 10-20 placement system and included T1 and T2 electrode placement.  Twenty four (24) channels of digital signal (sampling rate of 512/sec) was simultaneously recorded from the scalp including EKG and eye monitors.  Recording band pass was 0.1 to 100 hz and all data was stored digitally on the recorder.  The patient is instructed to press an event button when clinical symptoms occur and write the symptoms into a diary. Activation procedures which include photic stimulation, hyperventilation and instructing patients to perform simple task are done in selected patients.        The EEG is displayed on a monitor screen and can be reformatted into different montages for evaluation.  The entire recoding is submitted for computer assisted analysis to detect spike and electrographic seizure activity.  The entire recording is visually reviewed and the times identified by computer analysis as being spikes or seizures are reviewed again.  Compresses spectral analysis (CSA) is also performed on the activity recorded from each individual channel.  This is displayed as a power display of frequencies from 0 to 30 Hz over time.   The CSA analysis is done and displayed continuously.  This is reviewed for asymmetries in power between homologous areas of the scalp and for presence of changes in power which canbe seen when seizures occur.  Sections of suspected abnormalities on the CSA is then compared with the original EEG recording.  .     [employer] software was also utilized in the review of this study.  This software suite analyzes the EEG recording in multiple domains.  Coherence and rhythmicity is computed to identify EEG sections which may  contain organized seizures.  Each channel undergoes analysis to detect presence of spike and sharp waves which have special and morphological characteristic of epileptic activity.  The routine EEG recording is converted from spacial into frequency domain.  This is then displayed comparing homologous areas to identify areas of significant asymmetry.  Algorithm to identify non-cortically generated artifact is used to separate eye movement, EMG and other artifact from the EEG     Recording Times  Start on 12/15/2021  Stop on 12/15/2021    A total of 6:20:56 hours of EEG was recorded.      EEG FINDINGS:  Background activity:   The background rhythm was characterized by generalized polymorphic delta activity with some theta and spindle activity noted over the left hemisphere     Sleep:   Rudimentary sleep spindles, K complexes, but no well staged sleep    Activation procedures:  Eye blinks and reactivity noted    Abnormal activity:   No epileptiform discharges, periodic discharges, lateralized rhythmic delta activity or electrographic seizures were seen.    IMPRESSION:   Abnormal EEG due to moderate generalized cerebral dysfunction which is somewhat more pronounced over the right hemisphere.      Michael Garcia MD  Neurology-Epilepsy.  [facility].

## 2021-12-19 NOTE — NURSING
Time 17: 32 YOANA Gama with NCC team notified concerning pts seizure like activities, L facial twitching, not responding to commands. Symptoms lasted about 30 sec. Will continue to monitor.

## 2021-12-19 NOTE — NURSING
Pt transported to MRI via bed, on 3L NC. Ambu bag at the bedside. On continues/portable monitoring. Transported by RN.       Time 11:28 Pt transported back to room 9093. Precedex stopped. Pt tolerated MRI well. Will continue to monitor. PT transported on 3L NC. Ambu bag with pt. Pt on continues/portable monitoring.

## 2021-12-19 NOTE — PLAN OF CARE
[facility] Care Plan    POC reviewed with Selma Lux and family at 1400. Pt verbalized understanding. Questions and concerns addressed. No acute events today. Pt progressing toward goals. Will continue to monitor. See below and flowsheets for full assessment and VS info.   -Daughter updated at the bedside.   -Hill dc. 500 ml H2O bolus ; TF resumed per MD Michael order.   -MRI done. Precedex gtt stopped at 11:30.   -pt more awake today, following commands intermittently.      Neuro:  Radford Coma Scale  Best Eye Response: 3-->(E3) to speech  Best Motor Response: 6-->(M6) obeys commands  Best Verbal Response: 2-->(V2) incomprehensible speech  Radford Coma Scale Score: 11  Assessment Qualifiers: no eye obstruction present,patient not sedated/intubated  Pupil PERRLA: yes     24 hr Temp:  [97.7 °F (36.5 °C)-98.7 °F (37.1 °C)]     CV:   Rhythm: sinus tachycardia  BP goals:   SBP < 160  MAP > 65    Resp:   O2 Device (Oxygen Therapy): Aerosol Mask  Oxygen Concentration (%): 28    Plan: N/A    GI/:  SCOOBY Total Score: 0  Diet/Nutrition Received: NPO  Last Bowel Movement: 12/13/21  Voiding Characteristics: urethral catheter (bladder)    Intake/Output Summary (Last 24 hours) at 12/19/2021 1441  Last data filed at 12/19/2021 1401  Gross per 24 hour   Intake 1734.17 ml   Output 520 ml   Net 1214.17 ml     Unmeasured Output  Urine Occurrence: 1  Stool Occurrence: 0  Pad Count: 1    Labs/Accuchecks:  Recent Labs   Lab 12/18/21  0242   WBC 12.68   RBC 3.91*   HGB 11.2*   HCT 36.7*   *      Recent Labs   Lab 12/19/21  0637   *   K 4.0   CO2 31*      BUN 40*   CREATININE 1.0   ALKPHOS 54*   ALT 25   AST 19   BILITOT 1.8*      Recent Labs   Lab 12/13/21  1121   INR 1.0   APTT 28.6      Recent Labs   Lab 12/13/21  1055          Electrolytes: N/A - electrolytes WDL  Accuchecks: none    Gtts:   dexmedetomidine (PRECEDEX) infusion 0.2 mcg/kg/hr (12/19/21 1030)       LDA/Wounds:  Lines/Drains/Airways       Peripherally  Inserted Central Catheter Line              PICC Double Lumen 12/15/21 1616 right basilic 3 days              Drain                   NG/OG Tube 12/17/21 1405 Left nostril 2 days                  Wounds: No  Wound care consulted: No

## 2021-12-20 LAB
ALBUMIN SERPL BCP-MCNC: 3.1 G/DL (ref 3.5–5.2)
ALP SERPL-CCNC: 54 U/L (ref 55–135)
ALT SERPL W/O P-5'-P-CCNC: 23 U/L (ref 10–44)
ANION GAP SERPL CALC-SCNC: 5 MMOL/L (ref 8–16)
ANISOCYTOSIS BLD QL SMEAR: SLIGHT
AST SERPL-CCNC: 13 U/L (ref 10–40)
BASOPHILS # BLD AUTO: 0.03 K/UL (ref 0–0.2)
BASOPHILS NFR BLD: 0.1 % (ref 0–1.9)
BILIRUB SERPL-MCNC: 1.6 MG/DL (ref 0.1–1)
BUN SERPL-MCNC: 36 MG/DL (ref 8–23)
BURR CELLS BLD QL SMEAR: ABNORMAL
CALCIUM SERPL-MCNC: 9 MG/DL (ref 8.7–10.5)
CHLORIDE SERPL-SCNC: 110 MMOL/L (ref 95–110)
CO2 SERPL-SCNC: 31 MMOL/L (ref 23–29)
CREAT SERPL-MCNC: 0.8 MG/DL (ref 0.5–1.4)
DIFFERENTIAL METHOD: ABNORMAL
EOSINOPHIL # BLD AUTO: 0 K/UL (ref 0–0.5)
EOSINOPHIL NFR BLD: 0.2 % (ref 0–8)
ERYTHROCYTE [DISTWIDTH] IN BLOOD BY AUTOMATED COUNT: 15.9 % (ref 11.5–14.5)
EST. GFR  (AFRICAN AMERICAN): >60 ML/MIN/1.73 M^2
EST. GFR  (NON AFRICAN AMERICAN): >60 ML/MIN/1.73 M^2
GLUCOSE SERPL-MCNC: 197 MG/DL (ref 70–110)
HCT VFR BLD AUTO: 34.7 % (ref 37–48.5)
HGB BLD-MCNC: 10.5 G/DL (ref 12–16)
IMM GRANULOCYTES # BLD AUTO: 0.88 K/UL (ref 0–0.04)
IMM GRANULOCYTES NFR BLD AUTO: 3.6 % (ref 0–0.5)
LYMPHOCYTES # BLD AUTO: 0.6 K/UL (ref 1–4.8)
LYMPHOCYTES NFR BLD: 2.5 % (ref 18–48)
MAGNESIUM SERPL-MCNC: 2.1 MG/DL (ref 1.6–2.6)
MCH RBC QN AUTO: 28 PG (ref 27–31)
MCHC RBC AUTO-ENTMCNC: 30.3 G/DL (ref 32–36)
MCV RBC AUTO: 93 FL (ref 82–98)
MONOCYTES # BLD AUTO: 2.7 K/UL (ref 0.3–1)
MONOCYTES NFR BLD: 11.2 % (ref 4–15)
NEUTROPHILS # BLD AUTO: 20 K/UL (ref 1.8–7.7)
NEUTROPHILS NFR BLD: 82.4 % (ref 38–73)
NRBC BLD-RTO: 0 /100 WBC
OVALOCYTES BLD QL SMEAR: ABNORMAL
PHOSPHATE SERPL-MCNC: 2.1 MG/DL (ref 2.7–4.5)
PLATELET # BLD AUTO: 93 K/UL (ref 150–450)
PMV BLD AUTO: 11.5 FL (ref 9.2–12.9)
POIKILOCYTOSIS BLD QL SMEAR: SLIGHT
POLYCHROMASIA BLD QL SMEAR: ABNORMAL
POTASSIUM SERPL-SCNC: 4 MMOL/L (ref 3.5–5.1)
PROT SERPL-MCNC: 4.9 G/DL (ref 6–8.4)
RBC # BLD AUTO: 3.75 M/UL (ref 4–5.4)
SODIUM SERPL-SCNC: 146 MMOL/L (ref 136–145)
WBC # BLD AUTO: 24.32 K/UL (ref 3.9–12.7)

## 2021-12-20 PROCEDURE — 84100 ASSAY OF PHOSPHORUS: CPT

## 2021-12-20 PROCEDURE — 95720 PR EEG, W/VIDEO, CONT RECORD, I&R, >12<26 HRS: ICD-10-PCS | Mod: ,,, | Performed by: PSYCHIATRY & NEUROLOGY

## 2021-12-20 PROCEDURE — 63600175 PHARM REV CODE 636 W HCPCS: Performed by: PSYCHIATRY & NEUROLOGY

## 2021-12-20 PROCEDURE — 95720 EEG PHY/QHP EA INCR W/VEEG: CPT | Mod: ,,, | Performed by: PSYCHIATRY & NEUROLOGY

## 2021-12-20 PROCEDURE — 20000000 HC ICU ROOM

## 2021-12-20 PROCEDURE — 27000221 HC OXYGEN, UP TO 24 HOURS

## 2021-12-20 PROCEDURE — 25000242 PHARM REV CODE 250 ALT 637 W/ HCPCS: Performed by: PSYCHIATRY & NEUROLOGY

## 2021-12-20 PROCEDURE — 99900035 HC TECH TIME PER 15 MIN (STAT)

## 2021-12-20 PROCEDURE — C9254 INJECTION, LACOSAMIDE: HCPCS | Performed by: NURSE PRACTITIONER

## 2021-12-20 PROCEDURE — 94668 MNPJ CHEST WALL SBSQ: CPT

## 2021-12-20 PROCEDURE — 80053 COMPREHEN METABOLIC PANEL: CPT

## 2021-12-20 PROCEDURE — 25000003 PHARM REV CODE 250: Performed by: NURSE PRACTITIONER

## 2021-12-20 PROCEDURE — 99291 PR CRITICAL CARE, E/M 30-74 MINUTES: ICD-10-PCS | Mod: ,,, | Performed by: PSYCHIATRY & NEUROLOGY

## 2021-12-20 PROCEDURE — 63600175 PHARM REV CODE 636 W HCPCS: Performed by: NURSE PRACTITIONER

## 2021-12-20 PROCEDURE — 94640 AIRWAY INHALATION TREATMENT: CPT

## 2021-12-20 PROCEDURE — 31720 CLEARANCE OF AIRWAYS: CPT

## 2021-12-20 PROCEDURE — 92526 ORAL FUNCTION THERAPY: CPT

## 2021-12-20 PROCEDURE — 25000003 PHARM REV CODE 250: Performed by: STUDENT IN AN ORGANIZED HEALTH CARE EDUCATION/TRAINING PROGRAM

## 2021-12-20 PROCEDURE — 83735 ASSAY OF MAGNESIUM: CPT

## 2021-12-20 PROCEDURE — 25000242 PHARM REV CODE 250 ALT 637 W/ HCPCS: Performed by: PHYSICIAN ASSISTANT

## 2021-12-20 PROCEDURE — 63600175 PHARM REV CODE 636 W HCPCS

## 2021-12-20 PROCEDURE — A4216 STERILE WATER/SALINE, 10 ML: HCPCS | Performed by: PSYCHIATRY & NEUROLOGY

## 2021-12-20 PROCEDURE — 99291 CRITICAL CARE FIRST HOUR: CPT | Mod: ,,, | Performed by: PSYCHIATRY & NEUROLOGY

## 2021-12-20 PROCEDURE — 85025 COMPLETE CBC W/AUTO DIFF WBC: CPT

## 2021-12-20 PROCEDURE — 99233 SBSQ HOSP IP/OBS HIGH 50: CPT | Mod: ,,, | Performed by: NEUROLOGICAL SURGERY

## 2021-12-20 PROCEDURE — 25000003 PHARM REV CODE 250: Performed by: PSYCHIATRY & NEUROLOGY

## 2021-12-20 PROCEDURE — 99233 PR SUBSEQUENT HOSPITAL CARE,LEVL III: ICD-10-PCS | Mod: ,,, | Performed by: NEUROLOGICAL SURGERY

## 2021-12-20 PROCEDURE — 94761 N-INVAS EAR/PLS OXIMETRY MLT: CPT

## 2021-12-20 RX ADMIN — Medication 10 ML: at 05:12

## 2021-12-20 RX ADMIN — METHYLPREDNISOLONE SODIUM SUCCINATE 20 MG: 40 INJECTION, POWDER, FOR SOLUTION INTRAMUSCULAR; INTRAVENOUS at 08:12

## 2021-12-20 RX ADMIN — MYCOPHENOLATE MOFETIL 500 MG: 200 POWDER, FOR SUSPENSION ORAL at 09:12

## 2021-12-20 RX ADMIN — IPRATROPIUM BROMIDE AND ALBUTEROL SULFATE 3 ML: 2.5; .5 SOLUTION RESPIRATORY (INHALATION) at 04:12

## 2021-12-20 RX ADMIN — AMLODIPINE BESYLATE 10 MG: 10 TABLET ORAL at 08:12

## 2021-12-20 RX ADMIN — IPRATROPIUM BROMIDE AND ALBUTEROL SULFATE 3 ML: 2.5; .5 SOLUTION RESPIRATORY (INHALATION) at 07:12

## 2021-12-20 RX ADMIN — HYDROXYCHLOROQUINE SULFATE 200 MG: 200 TABLET, FILM COATED ORAL at 08:12

## 2021-12-20 RX ADMIN — SENNOSIDES AND DOCUSATE SODIUM 1 TABLET: 50; 8.6 TABLET ORAL at 08:12

## 2021-12-20 RX ADMIN — IPRATROPIUM BROMIDE AND ALBUTEROL SULFATE 3 ML: 2.5; .5 SOLUTION RESPIRATORY (INHALATION) at 11:12

## 2021-12-20 RX ADMIN — IPRATROPIUM BROMIDE AND ALBUTEROL SULFATE 3 ML: 2.5; .5 SOLUTION RESPIRATORY (INHALATION) at 12:12

## 2021-12-20 RX ADMIN — Medication 10 ML: at 12:12

## 2021-12-20 RX ADMIN — IPRATROPIUM BROMIDE AND ALBUTEROL SULFATE 3 ML: 2.5; .5 SOLUTION RESPIRATORY (INHALATION) at 09:12

## 2021-12-20 RX ADMIN — HEPARIN SODIUM 5000 UNITS: 5000 INJECTION INTRAVENOUS; SUBCUTANEOUS at 01:12

## 2021-12-20 RX ADMIN — MONTELUKAST 10 MG: 10 TABLET, FILM COATED ORAL at 08:12

## 2021-12-20 RX ADMIN — SODIUM CHLORIDE 100 MG: 9 INJECTION, SOLUTION INTRAVENOUS at 09:12

## 2021-12-20 RX ADMIN — ATORVASTATIN CALCIUM 40 MG: 20 TABLET, FILM COATED ORAL at 08:12

## 2021-12-20 RX ADMIN — HYDROXYCHLOROQUINE SULFATE 200 MG: 200 TABLET, FILM COATED ORAL at 09:12

## 2021-12-20 RX ADMIN — SODIUM CHLORIDE 100 MG: 9 INJECTION, SOLUTION INTRAVENOUS at 08:12

## 2021-12-20 RX ADMIN — LABETALOL HYDROCHLORIDE 5 MG: 5 INJECTION, SOLUTION INTRAVENOUS at 09:12

## 2021-12-20 RX ADMIN — LABETALOL HYDROCHLORIDE 5 MG: 5 INJECTION, SOLUTION INTRAVENOUS at 04:12

## 2021-12-20 RX ADMIN — Medication 10 ML: at 11:12

## 2021-12-20 RX ADMIN — HEPARIN SODIUM 5000 UNITS: 5000 INJECTION INTRAVENOUS; SUBCUTANEOUS at 05:12

## 2021-12-20 RX ADMIN — METOROPROLOL TARTRATE 5 MG: 5 INJECTION, SOLUTION INTRAVENOUS at 08:12

## 2021-12-20 RX ADMIN — HEPARIN SODIUM 5000 UNITS: 5000 INJECTION INTRAVENOUS; SUBCUTANEOUS at 09:12

## 2021-12-20 NOTE — PROGRESS NOTES
[facility]  Neurocritical Care  Progress Note    Admit Date: 12/13/2021  Service Date: 12/20/2021  Length of Stay: 7    Subjective:     Chief Complaint: SDH (subdural hematoma)    History of Present Illness:   The patient is an 86-year-old female with past medical history of TIA, RA and Afib of Eliquis admitted to [facility] with Right SDH.  She was found on the floor this morning only responding to painful stimuli.  Patient occasionally says her name.GCS 12 noted for EMS.  PCC administered. She had a mechanical fall out of bed on Thursday hitting her head and has been complaining of intermittent headaches since. Her last dose of eliquis was on 12/11. Pending repeat CT head. NSGY consulted. In addition patient was found to be hyponatremic at 118-central line placed and hypertonic saline infusion initiated. Pending repeat CT head. Patient admitted to [facility] for close monitoring and higher level of care.       Hospital Course: 12/13/2021: patient admitted to [facility] s/p fall on 12/11 on Eliquis  12/14/2021 wheezing, prolonged expiratory phase.   12/15/2021 copious respiratory secretions. Remains encephalopathic. Review EEG  12/16/2021 improved respiratory secretions. D/c nasal tracheal airway if minimal secretions.   12/17/2021 restless.   12/18/2021 lethargic, rising BUN/Cr ratio. Fluid resuscitation. Received flumazenil for BDZ   12/19/2021 encephalopathy overnight. Work up neg so far. BUN/Cr >20 from volume contraction.   12/20/2021 left facial twitching overnight. Loaded with vimpat.       Review of Symptoms: encephalopathic cannot participate    Medications:  Continuous Infusions:    Scheduled Meds:   albuterol-ipratropium  3 mL Nebulization Q4H    amLODIPine  10 mg Per NG tube Daily    atorvastatin  40 mg Per NG tube Daily    heparin (porcine)  5,000 Units Subcutaneous Q8H    hydrOXYchloroQUINE  200 mg Per NG tube BID    lacosamide (VIMPAT) IVPB  100 mg Intravenous Q12H    methylPREDNISolone sodium  succinate  20 mg Intravenous Daily    montelukast  10 mg Per NG tube Daily    mycophenolate mofetil  500 mg Per NG tube BID    senna-docusate 8.6-50 mg  1 tablet Per NG tube Daily    sodium chloride 0.9%  10 mL Intravenous Q6H     PRN Meds:.acetaminophen, albuterol-ipratropium, calcium gluconate IVPB, calcium gluconate IVPB, calcium gluconate IVPB, labetalol, magnesium sulfate IVPB, magnesium sulfate IVPB, metoprolol, ondansetron, potassium chloride in water **AND** potassium chloride in water **AND** potassium chloride in water, Flushing PICC Protocol **AND** sodium chloride 0.9% **AND** sodium chloride 0.9%    OBJECTIVE:   Vital Signs (Most Recent):   Temp: 98 °F (36.7 °C) (12/20/21 0330)  Pulse: 78 (12/20/21 0900)  Resp: (!) 23 (12/20/21 0900)  BP: 138/64 (12/20/21 0900)  SpO2: 98 % (12/20/21 0900)    Vital Signs (24h Range):   Temp:  [98 °F (36.7 °C)-98.7 °F (37.1 °C)] 98 °F (36.7 °C)  Pulse:  [] 78  Resp:  [14-53] 23  SpO2:  [95 %-100 %] 98 %  BP: (102-172)/(50-86) 138/64    ICP/CPP (Last 24h):        I & O (Last 24h):     Intake/Output Summary (Last 24 hours) at 12/20/2021 0947  Last data filed at 12/20/2021 0900  Gross per 24 hour   Intake 2043.97 ml   Output 400 ml   Net 1643.97 ml     Physical Exam:   GA: awake, comfortable, no acute distress.   HEENT: No scleral icterus or JVD. Neck supple  Pulmonary: Air entry equal to auscultation A/P/L. Wheezing +, crackles +  Cardiac: RRR, S1 & S2 w/o rubs/murmurs/gallops.   Abdominal: soft, non-tender, bowel sounds present x 4. No appreciable hepatosplenomegaly.  Skin: No jaundice, rashes, or visible lesions.  Neuro:  --Mental Status:  awake, follows one step commands. Able to show two fingers Spontaneous eye opening.   --Pupils 3.5 mm, PERRL.   --Corneal reflex not done in this arousable patient, gag, cough intact.  Decreased movement on the left UE and LE  MSK:  No edema in UE and LE    Vent Data:   Oxygen Concentration (%):  [35] 35  Resp Rate Total:  [22  br/min-26 br/min] 23 br/min    Lines/Drains/Airway:     PICC Double Lumen 12/15/21 1616 right basilic (Active)   Verification by X-ray Yes 12/20/21 0700   Site Assessment No drainage;No redness;No swelling;No warmth 12/20/21 0700   Extremity Assessment Distal to IV No abnormal discoloration 12/20/21 0700   Line Securement Device Secured with sutures 12/20/21 0700   Dressing Type Biopatch in place;Central line dressing 12/20/21 0700   Dressing Status Clean;Dry;Intact 12/20/21 0700   Dressing Intervention Integrity maintained 12/20/21 0700   Date on Dressing 12/15/21 12/20/21 0700   Dressing Due to be Changed 12/22/21 12/20/21 0700   Left Lumen Patency/Care Flushed w/o difficulty 12/20/21 0700   Right Lumen Patency/Care Flushed w/o difficulty 12/20/21 0700   Current Insertion Depth (cm) 33 cm 12/15/21 1616   Current Exposed Catheter (cm) 0 cm 12/15/21 1616   Extremity Circumference (cm) 33 cm 12/15/21 1616   Waveform Normal 12/17/21 1501   Line Necessity Review Poor venous access 12/20/21 0700             NG/OG Tube 12/17/21 1405 Left nostril (Active)   Placement Check placement verified by x-ray 12/20/21 0700   Tolerance no signs/symptoms of discomfort 12/20/21 0301   Securement secured to nostril center 12/20/21 0301   Clamp Status/Tolerance no restlessness;no residual;no nausea;no emesis;no abdominal distention;no abdominal discomfort;unclamped 12/19/21 1501   Suction Setting/Drainage Method suction at the bedside 12/19/21 1501   Insertion Site Appearance no redness, warmth, tenderness, skin breakdown, drainage 12/19/21 1501   Flush/Irrigation flushed w/;water;no resistance met 12/19/21 1501   Feeding Type continuous;by pump 12/20/21 0301   Feeding Action feeding continued 12/20/21 0301   Current Rate (mL/hr) 40 mL/hr 12/20/21 0700   Goal Rate (mL/hr) 40 mL/hr 12/20/21 0700   Intake (mL) 100 mL 12/20/21 0800   Water Bolus (mL) 150 mL 12/20/21 0401   Formula Name ISOSOURCE 12/20/21 0301   Intake (mL) - Formula Tube  Feeding 40 12/20/21 0900       Nutrition/Tube Feeds (if NPO state why): npo, aspiration precautions     Labs:  ABG:   No results for input(s): PH, PO2, PCO2, HCO3, POCSATURATED, BE in the last 24 hours.  BMP:  No results for input(s): NA, K, CL, CO2, BUN, CREATININE, GLU, MG, PHOS in the last 24 hours.  LFT:   Lab Results   Component Value Date    AST 19 12/19/2021    ALT 25 12/19/2021    ALKPHOS 54 (L) 12/19/2021    BILITOT 1.8 (H) 12/19/2021    ALBUMIN 3.6 12/19/2021    PROT 5.5 (L) 12/19/2021     CBC:   Lab Results   Component Value Date    WBC 12.68 12/18/2021    HGB 11.2 (L) 12/18/2021    HCT 36.7 (L) 12/18/2021    MCV 94 12/18/2021     (L) 12/18/2021     Microbiology x 7d:   Microbiology Results (last 7 days)     ** No results found for the last 168 hours. **        Imaging:  CXR 12/15 - improved interstitial edema. Lines in place. Widened mediastinum (POA)  I personally reviewed the above image.  Today I independently reviewed pertinent medications, lines/drains/airways, imaging, cardiology results, laboratory results, microbiology results, notably:   ASSESSMENT/PLAN:     Active Hospital Problems    Diagnosis    *SDH (subdural hematoma)    Stroke    Hyponatremia    Age-related physical debility    Leukocytosis    RA (rheumatoid arthritis)      Neuro:   SDH with brain compression  keppra for seizure prophylaxis  Acute encephalopathy likely multifactorial  Repeat CT head 12/18 - crowding and mass effect of right hemisphere from overlying SDH.   Plan for MRI brain 12/19 - demonstrated SDH with mass effect. No large territory ischemia to explain neurological decline  Received flumazenil 12/18 for ativan reversal.   Loaded with vimpat for left facial twitching overnight  Reconnect to cEEG   D/w neurosurgery no surgical plan at this time.     Pulmonary:   Solumedrol (conversion from home regimen of prednisone)  Duonebs/3% nacl/mucomyst  CXR     Cardiac:   SBP goal <160mmhg  EF 65%    Renal:     Decreased urine output in last 24hrs.   BUN/Cr >20 improving following fluid resuscitation  Hypernatremia - likely dehydration.  Repeat FWF 500ml x1 then increase 200cc q4hrly    ID:   Afebrile, normal wbc  Will monitor    Hem/Onc:   Transfuse for Hb <7 or 8 if associated with hypotension  plt count slight down trend but stable    Endocrine:    BS stable    Fluids/Electrolytes/Nutrition/GI:   Replete lytes as needed  tf  Lines:  Art +  CVC +  ETT NA  Hill + for strict I/O  NG +  PEG NA    Proph:  DVT:SCD/heparin  Constipation:  Last output:bowel regimen as needed  PUP: NA  VAP: NA    Family (daughter) updated at bedside today and discussed evolution of her clinical symptoms over the last 48-72hrs. Explained MRI findings, changes in her clinical exam and anticipated clinical trajectory.  we are discussing with neurosurgery team to determine next best clinical intervention.    Uninterrupted Critical Care/Counseling Time (not including procedures):: 33  mins    Juan Irwin MD, FN  Neurocritical care attending    Full Code    Juan Irwin MD  Neurocritical Care  [facility]

## 2021-12-20 NOTE — PT/OT/SLP PROGRESS
"Speech Language Pathology Treatment    Patient Name:  Selma uLx   MRN:  [id number]  Admitting Diagnosis: SDH (subdural hematoma)    Recommendations:                 General Recommendations:  Dysphagia therapy/ongoing swallowing assessment; Speech/language therapy  Diet recommendations:  NPO, Liquid Diet Level: NPO   Aspiration Precautions: Continue alternate means of nutrition, Frequent oral care, ice chips very sparingly when pt expressing desire for something to drink;  Strict aspiration precautions   General Precautions: Standard, aspiration,fall,NPO,seizure  Communication strategies:  go to room if call light pushed    Subjective     "ah" pt followed commands to phonate upon command, but vocal quality is very hoarse with low volume and intensity.     Pain/Comfort:  · Pain Rating 1:  (pt did not rate)    Respiratory Status: Nasal cannula (Venti mask removed by Resp Tx and nasal cannula placed temporarily to allow pt to participate in PO trials - SPO2 remained stable)    Objective:     Has the patient been evaluated by SLP for swallowing?   Yes  Keep patient NPO? Yes   Current Respiratory Status:        Pt seen for ongoing swallowing assessment. Pt with EEG in place, NG tube, and Venti mask. Respiratory Therapy moved pt to nasal cannula temporarily to allow pt to participate in PO trials for ongoing swallowing assessment.  Pt with difficulty with oral acceptance of ice chip due to multiple factors (placement of Kerlex securing continuous EEG and NG tube). Pt also accepted straw sips of water x 2. Productive cough was present after asked to phonate after ice chip. Suctioning performed.  No further PO trials were given at this time as pt appears at high risk for aspiration with concerns for ability to adequately protect airway given weak vocal quality.  Pt not appropriate for PO intake also due to high O2 needs as well.  Resp tx placed pt back on Venti mask after PO trials were completed.  SLP explained to pt and " family member that pt is not appropriate for PO diet at this time, but may receive an ice chip from nurse very sparingly if pt expresses thirst.      Assessment:     Selma Lux is a 86 y.o. female with an SLP diagnosis of Dysphagia and Cognitive-Linguistic Impairment.      Goals:   Multidisciplinary Problems     SLP Goals        Problem: SLP Goal    Goal Priority Disciplines Outcome   SLP Goal     SLP Ongoing, Progressing   Description: Speech Language Pathology Goals  Goals expected to be met by 12/30:  1. Pt will participate in ongoing swallowing assessment.   2. Pt will maintain alertness for 5 min intervals IND.   3. Pt will complete complex comprehension tasks with 90% accy given min cues.   4. Pt will implement simple speech strategies at phrase level with 80% intelligibility given min cues.   5. Pt will participate in ongoing assessment of language/cognition as appropriate.                       Plan:     · Patient to be seen:  5 x/week   · Plan of Care expires:  01/14/21  · Plan of Care reviewed with:  patient,family   · SLP Follow-Up:  Yes       Discharge recommendations:  rehabilitation facility     Time Tracking:     SLP Treatment Date:   12/20/21  Speech Start Time:  1220  Speech Stop Time:  1231     Speech Total Time (min):  11 min    Billable Minutes: Treatment Swallowing Dysfunction 11    12/20/2021

## 2021-12-20 NOTE — ASSESSMENT & PLAN NOTE
86F with on eliquis s/p head trauma 1 week prior now with small aSDH and AMS.    --admitted to [facility]  --CTH: R temp/paretial aSDH 5-6mm thickness, 3-5mm MLS, stable on repeat  --holding eliquis  --EEG pending, f/u   --keppra, AED per NCC  --SBP<150, HOB > 30  --Na >135,   --ok for SQH  --no acute neurosurgical intervention at this time given clinical improvement today.     Discussed with Dr. Justin     --We will continue to follow.     --Please page NSGY immediately for any changes in neuro status

## 2021-12-20 NOTE — SUBJECTIVE & OBJECTIVE
Interval history: improved on exam today. Moving left hemibody stronger.  EEG and MRI yesterday negative.     Vitals:    12/20/21 0959   BP:    Pulse: 91   Resp: (!) 22   Temp:      Review of Systems    Objective:     Weight: 67 kg (147 lb 11.3 oz)  Body mass index is 25.35 kg/m².  Vital Signs (Most Recent):  Temp: 98 °F (36.7 °C) (12/20/21 0330)  Pulse: 91 (12/20/21 0959)  Resp: (!) 22 (12/20/21 0959)  BP: 138/64 (12/20/21 0900)  SpO2: 96 % (12/20/21 0959) Vital Signs (24h Range):  Temp:  [98 °F (36.7 °C)-98.7 °F (37.1 °C)] 98 °F (36.7 °C)  Pulse:  [] 91  Resp:  [14-53] 22  SpO2:  [95 %-100 %] 96 %  BP: (102-172)/(50-86) 138/64     Date 12/20/21 0700 - 12/21/21 0659   Shift 2955-0296 3979-2679 9706-9967 24 Hour Total   INTAKE   NG/   220   IV Piggyback 43.9   43.9   Shift Total(mL/kg) 263.9(3.9)   263.9(3.9)   OUTPUT   Shift Total(mL/kg)       Weight (kg) 67 67 67 67            Oxygen Concentration (%):  [35] 35  Resp Rate Total:  [22 br/min-26 br/min] 23 br/min    Female External Urinary Catheter 12/13/21 1424 (Active)     Neuro exam:    E3V4M6 today.  AAOx2, slowed mentation  PERRL  CN grossly intact  FC xon right hemibody  Follow  EEG  Do MRI if no activity on eeg      Significant Labs:  Recent Labs   Lab 12/19/21  0637   GLU 99   *   K 4.0      CO2 31*   BUN 40*   CREATININE 1.0   CALCIUM 9.4     No results for input(s): WBC, HGB, HCT, PLT in the last 48 hours.  No results for input(s): LABPT, INR, APTT in the last 48 hours.  Microbiology Results (last 7 days)     ** No results found for the last 168 hours. **        All pertinent labs from the last 24 hours have been reviewed.    Significant Diagnostics:  I have reviewed all pertinent imaging results/findings within the past 24 hours.    Medications:  Continuous Infusions:  Scheduled Meds:   albuterol-ipratropium  3 mL Nebulization Q4H    amLODIPine  10 mg Per NG tube Daily    atorvastatin  40 mg Per NG tube Daily    heparin  (porcine)  5,000 Units Subcutaneous Q8H    hydrOXYchloroQUINE  200 mg Per NG tube BID    lacosamide (VIMPAT) IVPB  100 mg Intravenous Q12H    montelukast  10 mg Per NG tube Daily    mycophenolate mofetil  500 mg Per NG tube BID    [START ON 12/21/2021] predniSONE  25 mg Per NG tube Daily    senna-docusate 8.6-50 mg  1 tablet Per NG tube Daily    sodium chloride 0.9%  10 mL Intravenous Q6H     PRN Meds:acetaminophen, albuterol-ipratropium, calcium gluconate IVPB, calcium gluconate IVPB, calcium gluconate IVPB, labetalol, magnesium sulfate IVPB, magnesium sulfate IVPB, metoprolol, ondansetron, potassium chloride in water **AND** potassium chloride in water **AND** potassium chloride in water, Flushing PICC Protocol **AND** sodium chloride 0.9% **AND** sodium chloride 0.9%     Review of Systems  Objective:     Weight: 67 kg (147 lb 11.3 oz)  Body mass index is 25.35 kg/m².  Vital Signs (Most Recent):  Temp: 98 °F (36.7 °C) (12/20/21 0330)  Pulse: 91 (12/20/21 0959)  Resp: (!) 22 (12/20/21 0959)  BP: 138/64 (12/20/21 0900)  SpO2: 96 % (12/20/21 0959) Vital Signs (24h Range):  Temp:  [98 °F (36.7 °C)-98.7 °F (37.1 °C)] 98 °F (36.7 °C)  Pulse:  [] 91  Resp:  [14-53] 22  SpO2:  [95 %-100 %] 96 %  BP: (102-172)/(50-86) 138/64     Date 12/20/21 0700 - 12/21/21 0659   Shift 4608-3465 7136-9344 7522-2808 24 Hour Total   INTAKE   NG/   220   IV Piggyback 43.9   43.9   Shift Total(mL/kg) 263.9(3.9)   263.9(3.9)   OUTPUT   Shift Total(mL/kg)       Weight (kg) 67 67 67 67              Oxygen Concentration (%):  [35] 35  Resp Rate Total:  [22 br/min-26 br/min] 23 br/min         NG/OG Tube 12/17/21 1405 Left nostril (Active)   Placement Check placement verified by x-ray 12/20/21 0700   Tolerance no signs/symptoms of discomfort 12/20/21 0301   Securement secured to nostril center 12/20/21 0301   Clamp Status/Tolerance no restlessness;no residual;no nausea;no emesis;no abdominal distention;no abdominal  discomfort;unclamped 12/19/21 1501   Suction Setting/Drainage Method suction at the bedside 12/19/21 1501   Insertion Site Appearance no redness, warmth, tenderness, skin breakdown, drainage 12/19/21 1501   Flush/Irrigation flushed w/;water;no resistance met 12/19/21 1501   Feeding Type continuous;by pump 12/20/21 0301   Feeding Action feeding continued 12/20/21 0301   Current Rate (mL/hr) 40 mL/hr 12/20/21 0700   Goal Rate (mL/hr) 40 mL/hr 12/20/21 0700   Intake (mL) 100 mL 12/20/21 0800   Water Bolus (mL) 150 mL 12/20/21 0401   Formula Name ISOSOURCE 12/20/21 0301   Intake (mL) - Formula Tube Feeding 40 12/20/21 0900

## 2021-12-20 NOTE — PROGRESS NOTES
[facility]  Neurosurgery  Progress Note    Subjective:     History of Present Illness: 86-year-old female with past medical history of TIA comes to the emergency department for AMS.  She was found on the floor about 1 hour ago only responding to painful stimuli.  Patient occasionally says her name.GCS 12 noted for EMS.  Patient does take blood thinners. She had a mechanical fall out of bed on Thursday hitting her head and has been complaining of intermittent headaches since. Her last dose of eliquis was on 12/11. NSGY consulted for CTH with SDH      Post-Op Info:  * No surgery found *         Interval history: improved on exam today. Moving left hemibody stronger.  EEG and MRI yesterday negative.     Vitals:    12/20/21 0959   BP:    Pulse: 91   Resp: (!) 22   Temp:      Review of Systems    Objective:     Weight: 67 kg (147 lb 11.3 oz)  Body mass index is 25.35 kg/m².  Vital Signs (Most Recent):  Temp: 98 °F (36.7 °C) (12/20/21 0330)  Pulse: 91 (12/20/21 0959)  Resp: (!) 22 (12/20/21 0959)  BP: 138/64 (12/20/21 0900)  SpO2: 96 % (12/20/21 0959) Vital Signs (24h Range):  Temp:  [98 °F (36.7 °C)-98.7 °F (37.1 °C)] 98 °F (36.7 °C)  Pulse:  [] 91  Resp:  [14-53] 22  SpO2:  [95 %-100 %] 96 %  BP: (102-172)/(50-86) 138/64     Date 12/20/21 0700 - 12/21/21 0659   Shift 3440-2923 0446-7667 1843-5394 24 Hour Total   INTAKE   NG/   220   IV Piggyback 43.9   43.9   Shift Total(mL/kg) 263.9(3.9)   263.9(3.9)   OUTPUT   Shift Total(mL/kg)       Weight (kg) 67 67 67 67            Oxygen Concentration (%):  [35] 35  Resp Rate Total:  [22 br/min-26 br/min] 23 br/min    Female External Urinary Catheter 12/13/21 1424 (Active)     Neuro exam:    E3V4M6 today.  AAOx2, slowed mentation  PERRL  CN grossly intact  FC xon right hemibody  Follow  EEG  Do MRI if no activity on eeg      Significant Labs:  Recent Labs   Lab 12/19/21  0637   GLU 99   *   K 4.0      CO2 31*   BUN 40*   CREATININE 1.0    CALCIUM 9.4     No results for input(s): WBC, HGB, HCT, PLT in the last 48 hours.  No results for input(s): LABPT, INR, APTT in the last 48 hours.  Microbiology Results (last 7 days)     ** No results found for the last 168 hours. **        All pertinent labs from the last 24 hours have been reviewed.    Significant Diagnostics:  I have reviewed all pertinent imaging results/findings within the past 24 hours.    Medications:  Continuous Infusions:  Scheduled Meds:   albuterol-ipratropium  3 mL Nebulization Q4H    amLODIPine  10 mg Per NG tube Daily    atorvastatin  40 mg Per NG tube Daily    heparin (porcine)  5,000 Units Subcutaneous Q8H    hydrOXYchloroQUINE  200 mg Per NG tube BID    lacosamide (VIMPAT) IVPB  100 mg Intravenous Q12H    montelukast  10 mg Per NG tube Daily    mycophenolate mofetil  500 mg Per NG tube BID    [START ON 12/21/2021] predniSONE  25 mg Per NG tube Daily    senna-docusate 8.6-50 mg  1 tablet Per NG tube Daily    sodium chloride 0.9%  10 mL Intravenous Q6H     PRN Meds:acetaminophen, albuterol-ipratropium, calcium gluconate IVPB, calcium gluconate IVPB, calcium gluconate IVPB, labetalol, magnesium sulfate IVPB, magnesium sulfate IVPB, metoprolol, ondansetron, potassium chloride in water **AND** potassium chloride in water **AND** potassium chloride in water, Flushing PICC Protocol **AND** sodium chloride 0.9% **AND** sodium chloride 0.9%     Review of Systems  Objective:     Weight: 67 kg (147 lb 11.3 oz)  Body mass index is 25.35 kg/m².  Vital Signs (Most Recent):  Temp: 98 °F (36.7 °C) (12/20/21 0330)  Pulse: 91 (12/20/21 0959)  Resp: (!) 22 (12/20/21 0959)  BP: 138/64 (12/20/21 0900)  SpO2: 96 % (12/20/21 0959) Vital Signs (24h Range):  Temp:  [98 °F (36.7 °C)-98.7 °F (37.1 °C)] 98 °F (36.7 °C)  Pulse:  [] 91  Resp:  [14-53] 22  SpO2:  [95 %-100 %] 96 %  BP: (102-172)/(50-86) 138/64     Date 12/20/21 0700 - 12/21/21 0659   Shift 4004-8979 5284-9949 0876-6926 24 Hour  Total   INTAKE   NG/   220   IV Piggyback 43.9   43.9   Shift Total(mL/kg) 263.9(3.9)   263.9(3.9)   OUTPUT   Shift Total(mL/kg)       Weight (kg) 67 67 67 67              Oxygen Concentration (%):  [35] 35  Resp Rate Total:  [22 br/min-26 br/min] 23 br/min         NG/OG Tube 12/17/21 1405 Left nostril (Active)   Placement Check placement verified by x-ray 12/20/21 0700   Tolerance no signs/symptoms of discomfort 12/20/21 0301   Securement secured to nostril center 12/20/21 0301   Clamp Status/Tolerance no restlessness;no residual;no nausea;no emesis;no abdominal distention;no abdominal discomfort;unclamped 12/19/21 1501   Suction Setting/Drainage Method suction at the bedside 12/19/21 1501   Insertion Site Appearance no redness, warmth, tenderness, skin breakdown, drainage 12/19/21 1501   Flush/Irrigation flushed w/;water;no resistance met 12/19/21 1501   Feeding Type continuous;by pump 12/20/21 0301   Feeding Action feeding continued 12/20/21 0301   Current Rate (mL/hr) 40 mL/hr 12/20/21 0700   Goal Rate (mL/hr) 40 mL/hr 12/20/21 0700   Intake (mL) 100 mL 12/20/21 0800   Water Bolus (mL) 150 mL 12/20/21 0401   Formula Name ISOSOURCE 12/20/21 0301   Intake (mL) - Formula Tube Feeding 40 12/20/21 0900     Assessment/Plan:     * SDH (subdural hematoma)  86F with on eliquis s/p head trauma 1 week prior now with small aSDH and AMS.    --admitted to [facility]  --CTH: R temp/paretial aSDH 5-6mm thickness, 3-5mm MLS, stable on repeat  --holding eliquis  --EEG pending, f/u   --keppra, AED per NCC  --SBP<150, HOB > 30  --Na >135,   --ok for SQH  --no acute neurosurgical intervention at this time given clinical improvement today.     Discussed with Dr. Justin     --We will continue to follow.     --Please page NSGY immediately for any changes in neuro status      Emanuel Cantu MD  Neurosurgery  [facility]

## 2021-12-21 ENCOUNTER — ANESTHESIA EVENT (OUTPATIENT)
Dept: NEUROLOGY | Facility: HOSPITAL | Age: 86
DRG: 082 | End: 2021-12-21
Payer: MEDICARE

## 2021-12-21 ENCOUNTER — ANESTHESIA (OUTPATIENT)
Dept: NEUROLOGY | Facility: HOSPITAL | Age: 86
DRG: 082 | End: 2021-12-21
Payer: MEDICARE

## 2021-12-21 PROBLEM — I46.9 CARDIAC ARREST: Status: ACTIVE | Noted: 2021-12-21

## 2021-12-21 LAB
ALBUMIN SERPL BCP-MCNC: 3.1 G/DL (ref 3.5–5.2)
ALLENS TEST: ABNORMAL
ALLENS TEST: ABNORMAL
ALP SERPL-CCNC: 71 U/L (ref 55–135)
ALT SERPL W/O P-5'-P-CCNC: 36 U/L (ref 10–44)
ANION GAP SERPL CALC-SCNC: 17 MMOL/L (ref 8–16)
ANISOCYTOSIS BLD QL SMEAR: SLIGHT
APTT BLDCRRT: 26.2 SEC (ref 21–32)
AST SERPL-CCNC: 34 U/L (ref 10–40)
BACTERIA #/AREA URNS AUTO: ABNORMAL /HPF
BASOPHILS # BLD AUTO: ABNORMAL K/UL (ref 0–0.2)
BASOPHILS NFR BLD: 0 % (ref 0–1.9)
BILIRUB SERPL-MCNC: 1.5 MG/DL (ref 0.1–1)
BILIRUB UR QL STRIP: NEGATIVE
BUN SERPL-MCNC: 38 MG/DL (ref 8–23)
BURR CELLS BLD QL SMEAR: ABNORMAL
CALCIUM SERPL-MCNC: 8.8 MG/DL (ref 8.7–10.5)
CHLORIDE SERPL-SCNC: 109 MMOL/L (ref 95–110)
CLARITY UR REFRACT.AUTO: ABNORMAL
CO2 SERPL-SCNC: 21 MMOL/L (ref 23–29)
COLOR UR AUTO: YELLOW
CREAT SERPL-MCNC: 1 MG/DL (ref 0.5–1.4)
DIFFERENTIAL METHOD: ABNORMAL
EOSINOPHIL # BLD AUTO: ABNORMAL K/UL (ref 0–0.5)
EOSINOPHIL NFR BLD: 0 % (ref 0–8)
ERYTHROCYTE [DISTWIDTH] IN BLOOD BY AUTOMATED COUNT: 16.4 % (ref 11.5–14.5)
EST. GFR  (AFRICAN AMERICAN): 59.8 ML/MIN/1.73 M^2
EST. GFR  (NON AFRICAN AMERICAN): 51.9 ML/MIN/1.73 M^2
GLUCOSE SERPL-MCNC: 215 MG/DL (ref 70–110)
GLUCOSE UR QL STRIP: NEGATIVE
HCO3 UR-SCNC: 26.8 MMOL/L (ref 24–28)
HCT VFR BLD AUTO: 36.8 % (ref 37–48.5)
HCT VFR BLD CALC: 33 %PCV (ref 36–54)
HGB BLD-MCNC: 10.7 G/DL (ref 12–16)
HGB UR QL STRIP: ABNORMAL
HYALINE CASTS UR QL AUTO: 0 /LPF
IMM GRANULOCYTES # BLD AUTO: ABNORMAL K/UL (ref 0–0.04)
IMM GRANULOCYTES NFR BLD AUTO: ABNORMAL % (ref 0–0.5)
INR PPP: 1.1 (ref 0.8–1.2)
KETONES UR QL STRIP: NEGATIVE
LDH SERPL L TO P-CCNC: 5.88 MMOL/L (ref 0.36–1.25)
LEUKOCYTE ESTERASE UR QL STRIP: ABNORMAL
LYMPHOCYTES # BLD AUTO: ABNORMAL K/UL (ref 1–4.8)
LYMPHOCYTES NFR BLD: 9 % (ref 18–48)
MAGNESIUM SERPL-MCNC: 2.3 MG/DL (ref 1.6–2.6)
MCH RBC QN AUTO: 28.3 PG (ref 27–31)
MCHC RBC AUTO-ENTMCNC: 29.1 G/DL (ref 32–36)
MCV RBC AUTO: 97 FL (ref 82–98)
MICROSCOPIC COMMENT: ABNORMAL
MONOCYTES # BLD AUTO: ABNORMAL K/UL (ref 0.3–1)
MONOCYTES NFR BLD: 8 % (ref 4–15)
NEUTROPHILS NFR BLD: 82 % (ref 38–73)
NEUTS BAND NFR BLD MANUAL: 1 %
NITRITE UR QL STRIP: NEGATIVE
NRBC BLD-RTO: 1 /100 WBC
OVALOCYTES BLD QL SMEAR: ABNORMAL
PCO2 BLDA: 40.6 MMHG (ref 35–45)
PH SMN: 7.43 [PH] (ref 7.35–7.45)
PH UR STRIP: 7 [PH] (ref 5–8)
PHOSPHATE SERPL-MCNC: 5.7 MG/DL (ref 2.7–4.5)
PLATELET # BLD AUTO: 88 K/UL (ref 150–450)
PLATELET BLD QL SMEAR: ABNORMAL
PMV BLD AUTO: 12 FL (ref 9.2–12.9)
PO2 BLDA: 471 MMHG (ref 80–100)
POC BE: 2 MMOL/L
POC IONIZED CALCIUM: 1.22 MMOL/L (ref 1.06–1.42)
POC SATURATED O2: 100 % (ref 95–100)
POC TCO2: 28 MMOL/L (ref 23–27)
POCT GLUCOSE: 199 MG/DL (ref 70–110)
POCT GLUCOSE: 220 MG/DL (ref 70–110)
POIKILOCYTOSIS BLD QL SMEAR: ABNORMAL
POLYCHROMASIA BLD QL SMEAR: ABNORMAL
POTASSIUM BLD-SCNC: 4.4 MMOL/L (ref 3.5–5.1)
POTASSIUM SERPL-SCNC: 4.2 MMOL/L (ref 3.5–5.1)
PROCALCITONIN SERPL IA-MCNC: 0.59 NG/ML
PROT SERPL-MCNC: 5.3 G/DL (ref 6–8.4)
PROT UR QL STRIP: ABNORMAL
PROTHROMBIN TIME: 12.1 SEC (ref 9–12.5)
RBC # BLD AUTO: 3.78 M/UL (ref 4–5.4)
RBC #/AREA URNS AUTO: 0 /HPF (ref 0–4)
SAMPLE: ABNORMAL
SAMPLE: ABNORMAL
SCHISTOCYTES BLD QL SMEAR: ABNORMAL
SITE: ABNORMAL
SITE: ABNORMAL
SODIUM BLD-SCNC: 147 MMOL/L (ref 136–145)
SODIUM SERPL-SCNC: 147 MMOL/L (ref 136–145)
SP GR UR STRIP: 1.02 (ref 1–1.03)
URN SPEC COLLECT METH UR: ABNORMAL
WBC # BLD AUTO: 27.96 K/UL (ref 3.9–12.7)
WBC #/AREA URNS AUTO: >100 /HPF (ref 0–5)
WBC CLUMPS UR QL AUTO: ABNORMAL

## 2021-12-21 PROCEDURE — 25000003 PHARM REV CODE 250: Performed by: NURSE PRACTITIONER

## 2021-12-21 PROCEDURE — 83605 ASSAY OF LACTIC ACID: CPT

## 2021-12-21 PROCEDURE — 36600 WITHDRAWAL OF ARTERIAL BLOOD: CPT

## 2021-12-21 PROCEDURE — 25000003 PHARM REV CODE 250

## 2021-12-21 PROCEDURE — 87186 SC STD MICRODIL/AGAR DIL: CPT

## 2021-12-21 PROCEDURE — 84145 PROCALCITONIN (PCT): CPT | Performed by: PSYCHIATRY & NEUROLOGY

## 2021-12-21 PROCEDURE — 82803 BLOOD GASES ANY COMBINATION: CPT

## 2021-12-21 PROCEDURE — 20000000 HC ICU ROOM

## 2021-12-21 PROCEDURE — 94003 VENT MGMT INPAT SUBQ DAY: CPT

## 2021-12-21 PROCEDURE — 82800 BLOOD PH: CPT

## 2021-12-21 PROCEDURE — 85014 HEMATOCRIT: CPT

## 2021-12-21 PROCEDURE — 63600175 PHARM REV CODE 636 W HCPCS

## 2021-12-21 PROCEDURE — 27200188 HC TRANSDUCER, ART ADULT/PEDS

## 2021-12-21 PROCEDURE — 85730 THROMBOPLASTIN TIME PARTIAL: CPT | Performed by: NURSE PRACTITIONER

## 2021-12-21 PROCEDURE — 99900026 HC AIRWAY MAINTENANCE (STAT)

## 2021-12-21 PROCEDURE — 87040 BLOOD CULTURE FOR BACTERIA: CPT | Mod: 59 | Performed by: NURSE PRACTITIONER

## 2021-12-21 PROCEDURE — 82330 ASSAY OF CALCIUM: CPT

## 2021-12-21 PROCEDURE — 63600175 PHARM REV CODE 636 W HCPCS: Performed by: PSYCHIATRY & NEUROLOGY

## 2021-12-21 PROCEDURE — 36620 ARTERIAL LINE: ICD-10-PCS | Mod: ,,, | Performed by: NURSE PRACTITIONER

## 2021-12-21 PROCEDURE — 87077 CULTURE AEROBIC IDENTIFY: CPT

## 2021-12-21 PROCEDURE — S0030 INJECTION, METRONIDAZOLE: HCPCS | Performed by: PSYCHIATRY & NEUROLOGY

## 2021-12-21 PROCEDURE — 99233 SBSQ HOSP IP/OBS HIGH 50: CPT | Mod: ,,, | Performed by: NEUROLOGICAL SURGERY

## 2021-12-21 PROCEDURE — 93010 ELECTROCARDIOGRAM REPORT: CPT | Mod: ,,, | Performed by: INTERNAL MEDICINE

## 2021-12-21 PROCEDURE — 83735 ASSAY OF MAGNESIUM: CPT

## 2021-12-21 PROCEDURE — 84132 ASSAY OF SERUM POTASSIUM: CPT

## 2021-12-21 PROCEDURE — 87070 CULTURE OTHR SPECIMN AEROBIC: CPT

## 2021-12-21 PROCEDURE — 99900035 HC TECH TIME PER 15 MIN (STAT)

## 2021-12-21 PROCEDURE — 84295 ASSAY OF SERUM SODIUM: CPT

## 2021-12-21 PROCEDURE — 99291 PR CRITICAL CARE, E/M 30-74 MINUTES: ICD-10-PCS | Mod: 25,,, | Performed by: PSYCHIATRY & NEUROLOGY

## 2021-12-21 PROCEDURE — 81001 URINALYSIS AUTO W/SCOPE: CPT | Performed by: NURSE PRACTITIONER

## 2021-12-21 PROCEDURE — 94668 MNPJ CHEST WALL SBSQ: CPT

## 2021-12-21 PROCEDURE — 85027 COMPLETE CBC AUTOMATED: CPT

## 2021-12-21 PROCEDURE — 99291 CRITICAL CARE FIRST HOUR: CPT | Mod: 25,,, | Performed by: PSYCHIATRY & NEUROLOGY

## 2021-12-21 PROCEDURE — 95714 VEEG EA 12-26 HR UNMNTR: CPT

## 2021-12-21 PROCEDURE — 87205 SMEAR GRAM STAIN: CPT

## 2021-12-21 PROCEDURE — 76937 US GUIDE VASCULAR ACCESS: CPT

## 2021-12-21 PROCEDURE — 94640 AIRWAY INHALATION TREATMENT: CPT

## 2021-12-21 PROCEDURE — 94002 VENT MGMT INPAT INIT DAY: CPT

## 2021-12-21 PROCEDURE — 93010 EKG 12-LEAD: ICD-10-PCS | Mod: ,,, | Performed by: INTERNAL MEDICINE

## 2021-12-21 PROCEDURE — 95700 EEG CONT REC W/VID EEG TECH: CPT

## 2021-12-21 PROCEDURE — 25000003 PHARM REV CODE 250: Performed by: PSYCHIATRY & NEUROLOGY

## 2021-12-21 PROCEDURE — C9254 INJECTION, LACOSAMIDE: HCPCS | Performed by: NURSE PRACTITIONER

## 2021-12-21 PROCEDURE — 80053 COMPREHEN METABOLIC PANEL: CPT

## 2021-12-21 PROCEDURE — 94761 N-INVAS EAR/PLS OXIMETRY MLT: CPT

## 2021-12-21 PROCEDURE — A4216 STERILE WATER/SALINE, 10 ML: HCPCS | Performed by: PSYCHIATRY & NEUROLOGY

## 2021-12-21 PROCEDURE — 85610 PROTHROMBIN TIME: CPT | Performed by: NURSE PRACTITIONER

## 2021-12-21 PROCEDURE — 63600175 PHARM REV CODE 636 W HCPCS: Performed by: NURSE PRACTITIONER

## 2021-12-21 PROCEDURE — 99223 PR INITIAL HOSPITAL CARE,LEVL III: ICD-10-PCS | Mod: ,,, | Performed by: PSYCHIATRY & NEUROLOGY

## 2021-12-21 PROCEDURE — 84100 ASSAY OF PHOSPHORUS: CPT

## 2021-12-21 PROCEDURE — 36620 INSERTION CATHETER ARTERY: CPT

## 2021-12-21 PROCEDURE — 99233 PR SUBSEQUENT HOSPITAL CARE,LEVL III: ICD-10-PCS | Mod: ,,, | Performed by: NEUROLOGICAL SURGERY

## 2021-12-21 PROCEDURE — 93005 ELECTROCARDIOGRAM TRACING: CPT

## 2021-12-21 PROCEDURE — 99223 1ST HOSP IP/OBS HIGH 75: CPT | Mod: ,,, | Performed by: PSYCHIATRY & NEUROLOGY

## 2021-12-21 PROCEDURE — 31500 INSERT EMERGENCY AIRWAY: CPT | Mod: 59,GC,, | Performed by: ANESTHESIOLOGY

## 2021-12-21 PROCEDURE — 36620 INSERTION CATHETER ARTERY: CPT | Mod: ,,, | Performed by: NURSE PRACTITIONER

## 2021-12-21 PROCEDURE — 27000221 HC OXYGEN, UP TO 24 HOURS

## 2021-12-21 PROCEDURE — 25000242 PHARM REV CODE 250 ALT 637 W/ HCPCS: Performed by: PSYCHIATRY & NEUROLOGY

## 2021-12-21 PROCEDURE — 31500 INSERT EMERGENCY AIRWAY: CPT

## 2021-12-21 PROCEDURE — 31500 AD HOC INTUBATION: ICD-10-PCS | Mod: 59,GC,, | Performed by: ANESTHESIOLOGY

## 2021-12-21 PROCEDURE — 85007 BL SMEAR W/DIFF WBC COUNT: CPT

## 2021-12-21 RX ORDER — INSULIN ASPART 100 [IU]/ML
2 INJECTION, SOLUTION INTRAVENOUS; SUBCUTANEOUS
Status: DISCONTINUED | OUTPATIENT
Start: 2021-12-22 | End: 2021-12-22

## 2021-12-21 RX ORDER — INSULIN ASPART 100 [IU]/ML
1-10 INJECTION, SOLUTION INTRAVENOUS; SUBCUTANEOUS
Status: DISCONTINUED | OUTPATIENT
Start: 2021-12-21 | End: 2021-12-22

## 2021-12-21 RX ORDER — NOREPINEPHRINE BITARTRATE/D5W 4MG/250ML
0-3 PLASTIC BAG, INJECTION (ML) INTRAVENOUS CONTINUOUS
Status: DISCONTINUED | OUTPATIENT
Start: 2021-12-21 | End: 2021-12-23

## 2021-12-21 RX ORDER — GLUCAGON 1 MG
1 KIT INJECTION
Status: DISCONTINUED | OUTPATIENT
Start: 2021-12-21 | End: 2022-01-04 | Stop reason: HOSPADM

## 2021-12-21 RX ORDER — METRONIDAZOLE 500 MG/1
500 TABLET ORAL EVERY 8 HOURS
Status: DISPENSED | OUTPATIENT
Start: 2021-12-21 | End: 2021-12-23

## 2021-12-21 RX ORDER — NOREPINEPHRINE BITARTRATE/D5W 4MG/250ML
PLASTIC BAG, INJECTION (ML) INTRAVENOUS
Status: COMPLETED
Start: 2021-12-21 | End: 2021-12-21

## 2021-12-21 RX ORDER — FENTANYL CITRATE 50 UG/ML
INJECTION, SOLUTION INTRAMUSCULAR; INTRAVENOUS
Status: COMPLETED
Start: 2021-12-21 | End: 2021-12-21

## 2021-12-21 RX ORDER — FENTANYL CITRATE-0.9 % NACL/PF 10 MCG/ML
0-250 PLASTIC BAG, INJECTION (ML) INTRAVENOUS CONTINUOUS
Status: DISCONTINUED | OUTPATIENT
Start: 2021-12-21 | End: 2021-12-22

## 2021-12-21 RX ORDER — SODIUM CHLORIDE 9 MG/ML
INJECTION, SOLUTION INTRAVENOUS CONTINUOUS
Status: DISCONTINUED | OUTPATIENT
Start: 2021-12-21 | End: 2021-12-21

## 2021-12-21 RX ORDER — NICARDIPINE HYDROCHLORIDE 0.2 MG/ML
0-15 INJECTION INTRAVENOUS CONTINUOUS PRN
Status: DISCONTINUED | OUTPATIENT
Start: 2021-12-21 | End: 2021-12-21

## 2021-12-21 RX ORDER — ACETAMINOPHEN 325 MG/1
650 TABLET ORAL EVERY 6 HOURS PRN
Status: DISCONTINUED | OUTPATIENT
Start: 2021-12-21 | End: 2021-12-24

## 2021-12-21 RX ORDER — CEFEPIME HYDROCHLORIDE 1 G/1
1 INJECTION, POWDER, FOR SOLUTION INTRAMUSCULAR; INTRAVENOUS
Status: DISCONTINUED | OUTPATIENT
Start: 2021-12-21 | End: 2021-12-23

## 2021-12-21 RX ORDER — METRONIDAZOLE 500 MG/100ML
500 INJECTION, SOLUTION INTRAVENOUS
Status: DISCONTINUED | OUTPATIENT
Start: 2021-12-21 | End: 2021-12-21

## 2021-12-21 RX ORDER — INSULIN ASPART 100 [IU]/ML
2 INJECTION, SOLUTION INTRAVENOUS; SUBCUTANEOUS
Status: DISCONTINUED | OUTPATIENT
Start: 2021-12-21 | End: 2021-12-21

## 2021-12-21 RX ORDER — FENTANYL CITRATE 50 UG/ML
25 INJECTION, SOLUTION INTRAMUSCULAR; INTRAVENOUS ONCE
Status: COMPLETED | OUTPATIENT
Start: 2021-12-21 | End: 2021-12-21

## 2021-12-21 RX ORDER — EPINEPHRINE 0.1 MG/ML
INJECTION INTRAVENOUS CODE/TRAUMA/SEDATION MEDICATION
Status: COMPLETED | OUTPATIENT
Start: 2021-12-21 | End: 2021-12-21

## 2021-12-21 RX ADMIN — METRONIDAZOLE 500 MG: 500 TABLET ORAL at 11:12

## 2021-12-21 RX ADMIN — HEPARIN SODIUM 5000 UNITS: 5000 INJECTION INTRAVENOUS; SUBCUTANEOUS at 06:12

## 2021-12-21 RX ADMIN — NOREPINEPHRINE BITARTRATE 0.02 MCG/KG/MIN: 4 INJECTION, SOLUTION INTRAVENOUS at 04:12

## 2021-12-21 RX ADMIN — IPRATROPIUM BROMIDE AND ALBUTEROL SULFATE 3 ML: 2.5; .5 SOLUTION RESPIRATORY (INHALATION) at 07:12

## 2021-12-21 RX ADMIN — MYCOPHENOLATE MOFETIL 500 MG: 200 POWDER, FOR SUSPENSION ORAL at 09:12

## 2021-12-21 RX ADMIN — HEPARIN SODIUM 5000 UNITS: 5000 INJECTION INTRAVENOUS; SUBCUTANEOUS at 09:12

## 2021-12-21 RX ADMIN — Medication 10 ML: at 06:12

## 2021-12-21 RX ADMIN — SODIUM CHLORIDE 100 MG: 9 INJECTION, SOLUTION INTRAVENOUS at 09:12

## 2021-12-21 RX ADMIN — CEFEPIME 1 G: 1 INJECTION, POWDER, FOR SOLUTION INTRAMUSCULAR; INTRAVENOUS at 05:12

## 2021-12-21 RX ADMIN — CEFEPIME 1 G: 1 INJECTION, POWDER, FOR SOLUTION INTRAMUSCULAR; INTRAVENOUS at 06:12

## 2021-12-21 RX ADMIN — AMLODIPINE BESYLATE 10 MG: 10 TABLET ORAL at 09:12

## 2021-12-21 RX ADMIN — HEPARIN SODIUM 5000 UNITS: 5000 INJECTION INTRAVENOUS; SUBCUTANEOUS at 02:12

## 2021-12-21 RX ADMIN — FENTANYL CITRATE 25 MCG: 50 INJECTION, SOLUTION INTRAMUSCULAR; INTRAVENOUS at 03:12

## 2021-12-21 RX ADMIN — IPRATROPIUM BROMIDE AND ALBUTEROL SULFATE 3 ML: 2.5; .5 SOLUTION RESPIRATORY (INHALATION) at 12:12

## 2021-12-21 RX ADMIN — METHYLPREDNISOLONE SODIUM SUCCINATE 20 MG: 40 INJECTION, POWDER, FOR SOLUTION INTRAMUSCULAR; INTRAVENOUS at 10:12

## 2021-12-21 RX ADMIN — EPINEPHRINE 1 MG: 0.1 INJECTION, SOLUTION ENDOTRACHEAL; INTRACARDIAC; INTRAVENOUS at 02:12

## 2021-12-21 RX ADMIN — MONTELUKAST 10 MG: 10 TABLET, FILM COATED ORAL at 09:12

## 2021-12-21 RX ADMIN — Medication 10 ML: at 11:12

## 2021-12-21 RX ADMIN — Medication 25 MCG/HR: at 04:12

## 2021-12-21 RX ADMIN — IPRATROPIUM BROMIDE AND ALBUTEROL SULFATE 3 ML: 2.5; .5 SOLUTION RESPIRATORY (INHALATION) at 11:12

## 2021-12-21 RX ADMIN — ATORVASTATIN CALCIUM 40 MG: 20 TABLET, FILM COATED ORAL at 09:12

## 2021-12-21 RX ADMIN — VANCOMYCIN HYDROCHLORIDE 1250 MG: 1.25 INJECTION, POWDER, LYOPHILIZED, FOR SOLUTION INTRAVENOUS at 06:12

## 2021-12-21 RX ADMIN — IPRATROPIUM BROMIDE AND ALBUTEROL SULFATE 3 ML: 2.5; .5 SOLUTION RESPIRATORY (INHALATION) at 04:12

## 2021-12-21 RX ADMIN — FENTANYL CITRATE 25 MCG: 50 INJECTION INTRAMUSCULAR; INTRAVENOUS at 03:12

## 2021-12-21 RX ADMIN — METRONIDAZOLE 500 MG: 500 INJECTION, SOLUTION INTRAVENOUS at 07:12

## 2021-12-21 RX ADMIN — HYDROXYCHLOROQUINE SULFATE 200 MG: 200 TABLET, FILM COATED ORAL at 09:12

## 2021-12-21 RX ADMIN — SODIUM CHLORIDE: 0.9 INJECTION, SOLUTION INTRAVENOUS at 04:12

## 2021-12-21 RX ADMIN — Medication 10 ML: at 05:12

## 2021-12-21 RX ADMIN — IPRATROPIUM BROMIDE AND ALBUTEROL SULFATE 3 ML: 2.5; .5 SOLUTION RESPIRATORY (INHALATION) at 03:12

## 2021-12-21 RX ADMIN — METRONIDAZOLE 500 MG: 500 INJECTION, SOLUTION INTRAVENOUS at 02:12

## 2021-12-21 RX ADMIN — PREDNISONE 25 MG: 20 TABLET ORAL at 09:12

## 2021-12-21 RX ADMIN — METRONIDAZOLE 500 MG: 500 TABLET ORAL at 03:12

## 2021-12-21 NOTE — PT/OT/SLP PROGRESS
Physical Therapy      Patient Name:  Selma Lux   MRN:  [id number]    Patient not seen today secondary to patient now intubated. Will follow up pending extubation as patient is appropriate for out of bed mobility.

## 2021-12-21 NOTE — SUBJECTIVE & OBJECTIVE
Interval History: PEA arrest overnight following episode of agitation. CTH grossly stable.     Medications:  Continuous Infusions:   sodium chloride 0.9% Stopped (12/21/21 0757)    fentanyl 100 mcg/hr (12/21/21 0800)    niCARdipine      NORepinephrine bitartrate-D5W Stopped (12/21/21 0745)     Scheduled Meds:   albuterol-ipratropium  3 mL Nebulization Q4H    amLODIPine  10 mg Per NG tube Daily    atorvastatin  40 mg Per NG tube Daily    ceFEPime (MAXIPIME) IVPB  1 g Intravenous Q12H    heparin (porcine)  5,000 Units Subcutaneous Q8H    hydrOXYchloroQUINE  200 mg Per NG tube BID    lacosamide (VIMPAT) IVPB  100 mg Intravenous Q12H    metronidazole  500 mg Intravenous Q8H    montelukast  10 mg Per NG tube Daily    mycophenolate mofetil  500 mg Per NG tube BID    predniSONE  25 mg Per NG tube Daily    senna-docusate 8.6-50 mg  1 tablet Per NG tube Daily    sodium chloride 0.9%  250 mL Intravenous Once    sodium chloride 0.9%  10 mL Intravenous Q6H     PRN Meds:acetaminophen, albuterol-ipratropium, calcium gluconate IVPB, calcium gluconate IVPB, calcium gluconate IVPB, labetalol, magnesium sulfate IVPB, magnesium sulfate IVPB, niCARdipine, ondansetron, potassium chloride in water **AND** potassium chloride in water **AND** potassium chloride in water, Flushing PICC Protocol **AND** sodium chloride 0.9% **AND** sodium chloride 0.9%, Pharmacy to dose Vancomycin consult **AND** vancomycin - pharmacy to dose     Review of Systems  Objective:     Weight: 67 kg (147 lb 11.3 oz)  Body mass index is 25.35 kg/m².  Vital Signs (Most Recent):  Temp: 98 °F (36.7 °C) (12/21/21 0700)  Pulse: 99 (12/21/21 0805)  Resp: 18 (12/21/21 0805)  BP: 134/61 (12/21/21 0200)  SpO2: 100 % (12/21/21 0805) Vital Signs (24h Range):  Temp:  [98 °F (36.7 °C)-98.6 °F (37 °C)] 98 °F (36.7 °C)  Pulse:  [] 99  Resp:  [12-75] 18  SpO2:  [89 %-100 %] 100 %  BP: (112-162)/() 134/61  Arterial Line BP: ()/(0-83) 123/64      Date 12/21/21 0700 - 12/22/21 0659   Shift 3895-1272 7226-0782 6341-3980 24 Hour Total   INTAKE   I.V.(mL/kg) 32.8(0.5)   32.8(0.5)   IV Piggyback 185.9   185.9   Shift Total(mL/kg) 218.7(3.3)   218.7(3.3)   OUTPUT   Urine(mL/kg/hr) 100   100   Shift Total(mL/kg) 100(1.5)   100(1.5)   Weight (kg) 67 67 67 67              Vent Mode: A/C  Oxygen Concentration (%):  [35-70] 70  Resp Rate Total:  [18 br/min-27 br/min] 18 br/min  Vt Set:  [450 mL] 450 mL  PEEP/CPAP:  [6 cmH20] 6 cmH20  Mean Airway Pressure:  [9.8 etM76-67 cmH20] 9.8 cmH20         NG/OG Tube 12/17/21 1405 Left nostril (Active)   Placement Check placement verified by x-ray 12/21/21 0700   Tolerance no signs/symptoms of discomfort 12/21/21 0301   Securement secured to nostril center 12/21/21 0301   Clamp Status/Tolerance no abdominal discomfort;no abdominal distention;no emesis;no nausea;no restlessness 12/21/21 0301   Suction Setting/Drainage Method suction at the bedside 12/19/21 1501   Insertion Site Appearance no redness, warmth, tenderness, skin breakdown, drainage 12/19/21 1501   Flush/Irrigation flushed w/;water;no resistance met 12/19/21 1501   Feeding Type continuous;by pump 12/20/21 2301   Feeding Action feeding held 12/21/21 0700   Current Rate (mL/hr) 40 mL/hr 12/20/21 2301   Goal Rate (mL/hr) 40 mL/hr 12/20/21 2301   Intake (mL) 100 mL 12/20/21 0800   Water Bolus (mL) 200 mL 12/21/21 0001   Formula Name ISOSOURCE 12/21/21 0301   Intake (mL) - Formula Tube Feeding 40 12/21/21 0100            Urethral Catheter 12/21/21 0300 Temperature probe 16 Fr. (Active)   Site Assessment Clean;Intact 12/21/21 0700   Collection Container Urimeter 12/21/21 0700   Securement Method secured to top of thigh w/ adhesive device 12/21/21 0301   Catheter Care Performed yes 12/21/21 0301   Reason for Continuing Urinary Catheterization Critically ill in ICU and requiring hourly monitoring of intake/output 12/21/21 0301   CAUTI Prevention Bundle StatLock in place w  "1" slack;Intact seal between catheter & drainage tubing;Drainage bag/urimeter off the floor;Sheeting clip in use;No dependent loops or kinks;Drainage bag/urimeter not overfilled (<2/3 full);Drainage bag/urimeter below bladder 12/21/21 0301   Output (mL) 50 mL 12/21/21 0800       Neurosurgery Physical Exam   E4VTM6  PERRL  +c/c/g  FCx4  GUEVARA spontaneously      Significant Labs:  Recent Labs   Lab 12/20/21  1007 12/21/21  0248   * 215*   * 147*   K 4.0 4.2    109   CO2 31* 21*   BUN 36* 38*   CREATININE 0.8 1.0   CALCIUM 9.0 8.8   MG 2.1 2.3     Recent Labs   Lab 12/20/21  1007 12/21/21  0248 12/21/21  0304   WBC 24.32* 27.96*  --    HGB 10.5* 10.7*  --    HCT 34.7* 36.8* 33*   PLT 93* 88*  --      Recent Labs   Lab 12/21/21  0255   INR 1.1   APTT 26.2     Microbiology Results (last 7 days)     Procedure Component Value Units Date/Time    Blood culture [[id number]]     Order Status: No result Specimen: Blood     Blood culture [[id number]]     Order Status: No result Specimen: Blood         Significant Diagnostics:  I have reviewed and interpreted all pertinent imaging results/findings within the past 24 hours.  "

## 2021-12-21 NOTE — ASSESSMENT & PLAN NOTE
86F with on eliquis s/p head trauma 1 week prior now with small aSDH and AMS c/b PEA arrest 12/20:    --admitted to [facility]  --CTH: R temp/paretial aSDH 5-6mm thickness, 3-5mm MLS, stable on repeat as well as imaging post PEA arrest  --holding eliquis  --f/up cEEG  --AED per Epilepsy  --SBP<150, HOB > 30  --Na >135,   --ok for SQH  --no acute neurosurgical intervention at this time, PEA arrest w/up per NCC  --Please page NSGY immediately for any changes in neuro status    Please contact the on call neurosurgery provider with questions or concerns. Provider may be reached via  or during weekday call only via [employer] [phone].     D/w Dr Justin

## 2021-12-21 NOTE — SIGNIFICANT EVENT
0235: Called to bedside for acute change. Pt became acutely agitated then went unresponsive and apneic. BVM ventilations were given and anesthesia called for emergent intubation.  pt went to PEA. ACLS initiated epi x 2 and intubation, ROSC achieved. Pt spont moving all ext  To stimuli. Labs sent, Koki placed ABG done. CTH ordered once stable for transport. Daughter updated and on the way to hospital. Please see code summary for full details.     Time Event Details User   02:33:00 Code Start Patient with respiratory distress then PEA.  Elida Mcrae RN   02:33:00 Medication Ordered and Given EPINEPHrine 0.1 mg/mL injection - Dose: 1 mg ; Route: Intravenous ; Line: PICC Double Lumen 12/15/21 1616 right basilic   Ordered by: KAMLA Epstein RN   02:33:00 Staff Arrived Shawn Gillis NP [Attending]; Triny Turner RN [Registered Nurse]; Sisi Roberts, RRT [Day Respiratory Care Practitioner]; Skyler Darnell RN [Registered Nurse] Elida Mcrae RN   02:33:00 Rhythm/ Defib Rhythm Check  Compressions: Initiated  Pulse Palpable?: No  Cardiac Rhythm: PEA  Shockable Rhythm: No Elida Mcrae RN   02:37:00 Medication Given EPINEPHrine 0.1 mg/mL injection - Dose: 1 mg ; Route: Intravenous ; Line: PICC Double Lumen 12/15/21 1616 right basilic Triny Turner RN   02:38:00 Code Outcome Outcome  Survival: Yes  Code Termination Due to: Return of Spontaneous Circulation Elida Mcrae RN   02:38:00 Rhythm/ Defib Rhythm Check  Compressions: Stopped  Pulse Palpable?: Yes  Cardiac Rhythm: ST Elida Mcrae RN   02:39:00      Airway - Non-Surgical 12/21/21 0239 Endotracheal Tube Placed Placement Date/Time: 12/21/21 0239   Present Prior to Hospital Arrival?: No  Method of Intubation: Direct laryngoscopy  Inserted by: MD  Staff/Resident Name(s): STEPHAN Pride MD  Airway Device: Endotracheal Tube  Airway Device Size: 7.5  Placement Verifi... Elida Mcrae RN   02:39:00 Airway Airway  Respirations:  Agonal  Ventilation: Bag Valve Mask  Airway Interventions: Artificial airway placed  Artificial Airway: ETT  Intubation Confirmation: Auscultation; CO2 Colorimetric Device; Direct Laryngoscopy Elida Mcrae RN   02:40:00 Code End  Elida Mcrae RN   02:40:00 Staff Departed Shawn Gillis NP [Attending] (Automatically marked out by Code End event); Triny Turner RN [Registered Nurse] (Automatically marked out by Code End event); Sisi Roberts RRT [Day Respiratory Care Practitioner] (Automatically marked out by Code End event); Skyler Darnell, RN [Registered Nurse] (Automatically marked out by Code End event)        Critical condition in that Patient has a condition that poses threat to life and bodily function: Respiratory arrest, PEA, SDH, Encephalopathy, Intubated      35 minutes of Critical care time was spent personally by me on the following activities: development of treatment plan with patient or surrogate and bedside caregivers, discussions with consultants, evaluation of patient's response to treatment, examination of patient, ordering and performing treatments and interventions, ordering and review of laboratory studies, ordering and review of radiographic studies, pulse oximetry, antibiotic titration if applicable, vasopressor titration if applicable, re-evaluation of patient's condition. This critical care time did not overlap with that of any other provider or involve time for any procedures. There is high probability for acute neurological change leading to clinical and possibly life-threatening deterioration requiring highest level of physician preparedness for urgent intervention.

## 2021-12-21 NOTE — PROCEDURES
"Selma Lux is a 86 y.o. female patient.    Temp: 98.3 °F (36.8 °C) (12/20/21 2330)  Pulse: (!) 114 (12/21/21 0314)  Resp: (!) 21 (12/21/21 0314)  BP: 134/61 (12/21/21 0200)  SpO2: 97 % (12/21/21 0314)  Weight: 67 kg (147 lb 11.3 oz) (12/19/21 0500)  Height: 5' 4" (162.6 cm) (12/14/21 0800)       Arterial Line    Date/Time: 12/21/2021 3:26 AM  Location procedure was performed: [facility]UR[facility]  Performed by: Shawn Gillis NP  Authorized by: Shawn Gillis NP   Consent Done: Emergent Situation  Preparation: Patient was prepped and draped in the usual sterile fashion.  Indications: multiple ABGs, respiratory failure and hemodynamic monitoring  Location: left brachial  Needle gauge: 20  Number of attempts: 2  Complications: No  Estimated blood loss (mL): 10  Implants: No  Post-procedure: dressing applied  Post-procedure CMS: normal  Patient tolerance: Patient tolerated the procedure well with no immediate complications          12/21/2021  "

## 2021-12-21 NOTE — RESPIRATORY THERAPY
Pt intubated with 7.5 ett secured @ 24 cm at the lips. Bite block inserted. Pt placed on [id number] with settings documented in flowsheet. abg obtained. See flowsheets for details.

## 2021-12-21 NOTE — SUBJECTIVE & OBJECTIVE
History reviewed. No pertinent past medical history.    History reviewed. No pertinent surgical history.    Review of patient's allergies indicates:  No Known Allergies    No current facility-administered medications on file prior to encounter.     Current Outpatient Medications on File Prior to Encounter   Medication Sig    amLODIPine (NORVASC) 10 MG tablet Take 10 mg by mouth once daily.    atorvastatin (LIPITOR) 40 MG tablet Take 40 mg by mouth once daily.    baclofen (LIORESAL) 10 MG tablet Take 10 mg by mouth 3 (three) times daily.    ELIQUIS 5 mg Tab Take 5 mg by mouth 2 (two) times daily.    furosemide (LASIX) 40 MG tablet Take 40 mg by mouth once daily.    gabapentin (NEURONTIN) 300 MG capsule Take 300 mg by mouth once daily.    hydrOXYchloroQUINE (PLAQUENIL) 200 mg tablet Take 200 mg by mouth 2 (two) times daily.    montelukast (SINGULAIR) 10 mg tablet Take 10 mg by mouth once daily.    mycophenolate (CELLCEPT) 500 mg Tab Take 500 mg by mouth 2 (two) times daily.    NURTEC 75 mg odt Take by mouth.    predniSONE (DELTASONE) 5 MG tablet Take 5 mg by mouth once daily.    sumatriptan (IMITREX) 50 MG tablet Take by mouth.     Continuous Infusions:   fentanyl Stopped (12/21/21 0957)    NORepinephrine bitartrate-D5W Stopped (12/21/21 0745)       Family History    None       Tobacco Use    Smoking status: Not on file    Smokeless tobacco: Not on file   Substance and Sexual Activity    Alcohol use: Not on file    Drug use: Not on file    Sexual activity: Not on file     Review of Systems   Unable to perform ROS: Intubated     Objective:     Vital Signs (Most Recent):  Temp: 98 °F (36.7 °C) (12/21/21 0700)  Pulse: 104 (12/21/21 1400)  Resp: 18 (12/21/21 1400)  BP: 134/61 (12/21/21 0200)  SpO2: 100 % (12/21/21 1400) Vital Signs (24h Range):  Temp:  [98 °F (36.7 °C)-98.3 °F (36.8 °C)] 98 °F (36.7 °C)  Pulse:  [] 104  Resp:  [10-75] 18  SpO2:  [89 %-100 %] 100 %  BP: (120-147)/()  Stable, continue lisinopril, atenolol     134/61  Arterial Line BP: ()/(0-83) 142/67     Weight: 67 kg (147 lb 11.3 oz)  Body mass index is 25.35 kg/m².    Physical Exam  Constitutional:       General: She is not in acute distress.     Appearance: She is not diaphoretic.      Interventions: She is intubated.   HENT:      Head: Normocephalic and atraumatic.      Comments: EEG in place  Eyes:      General: No scleral icterus.        Right eye: No discharge.         Left eye: No discharge.      Conjunctiva/sclera: Conjunctivae normal.      Pupils: Pupils are equal, round, and reactive to light.   Cardiovascular:      Rate and Rhythm: Normal rate.   Pulmonary:      Effort: Pulmonary effort is normal. No respiratory distress. She is intubated.      Comments: Intubated  Abdominal:      General: There is no distension.      Palpations: Abdomen is soft.      Tenderness: There is no abdominal tenderness.   Musculoskeletal:         General: No deformity or signs of injury.   Skin:     General: Skin is warm and dry.   Psychiatric:      Comments: Unable to assess         NEUROLOGICAL EXAMINATION:     CRANIAL NERVES     CN III, IV, VI   Pupils are equal, round, and reactive to light.       Arouses to verbal stimuli   FERMÍN OU   Corneal intact OU   + spontaneous eye opening   Face symmetric   Tongue midline   Follows simple commands on R  Spontaneous movements on R  No spontaneous movements on L    Exam findings to suggest seizures:  Myoclonus - no   eye twitching - no   Nystagmus - no   gaze deviation - no   waxy rigidity - no        Significant Labs: All pertinent lab results from the past 24 hours have been reviewed.    Significant Studies: I have reviewed all pertinent imaging results/findings within the past 24 hours.

## 2021-12-21 NOTE — ASSESSMENT & PLAN NOTE
86F PMHx of atrial fibrillation on Eliquis, TIA, and RA who initially presented to the ER on 12/13 via EMS for AMS. Per chart, patient found on the floor only responding to painful stimuli with reported mechanical fall out of bed on Thursday prior with head trauma and subsequent complaints of headache. CTH revealed R SDH with mass effect and L MLS. Neurosurgery was consulted and patient was admitted to [facility] for higher level of care. Hospital course c/b copious respiratory secretions, encephalopathy, KERMIT, facial twitching for which patient received Lacosamide, and PEA arrest 12/21 with subsequent intubation. Patient monitored on EEG multiples times on 12/14, 12/15, 12/18, 12/19, 12/20, and 12/21- no electrographic seizures seen. 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     Recommendations:  - Discontinue vEEG monitoring  - Recommend to continue Lacosamide 100 mg BID  - Avoid agents that lower seizure threshold  - Management of infectious/metabolic abnormalities per [facility]  - Seizure precautions      Discussed plan of care with [facility] team and daughters at bedside. Will sign off, please call with questions.

## 2021-12-21 NOTE — PT/OT/SLP PROGRESS
Speech Language Pathology  Discharge    Selma Lux  MRN: [id number]    Patient not seen today secondary to Other (Pt currently intubated). Please re-consult once pt extubated and appropriate for SLP services.

## 2021-12-21 NOTE — PROCEDURES
EEG prelim  12:17 p.m.-18:34 p.m.    Background:  The background is continuous and asymmetric.  Over the left, there is mixed frequency theta/delta activity.  Over the right, there are significantly lower voltages with slower activity.  There is plenty of intermittent generalized and multifocal slowing seen bilaterally.    On select portions of the video, the patient's left hand is noted to have brief myoclonic wrist flexing movements and sometimes she has a stereotyped shoulder shrug of the left shoulder.    Impression:  Myoclonic flexion movements of the left wrist and shoulder shrug movements of the left shoulder.  These movements do not have an EEG correlate and are not discrete electrographic seizures.  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  Clinical correlation is advised.  Moderate encephalopathy with evidence of subcortical/deep midline dysfunction.    Please hit the button if this jerking spreads elsewhere including the patient's face.    Full report after the completion of the study.    Brianda Goss MD PhD  Neurology-Epilepsy  [facility].

## 2021-12-21 NOTE — EICU
Rounding (Video Assessment):  No    Intervention Initiated From:  Bedside    Stevan Communicated with Bedside Nurse regarding:  Documentation    Comments: eLert received at 0236. Code blue in progress. Staff initiated CPR and gave Epi X 1 at 0233. Patient developed respiratory distress then PEA. ROSC at 0238 after additional dose of epi, sinus tachycardia. Intubated per Dr. Steve clayton/ Anesthesia dept. at 0239. Left brachial art line placed per LELE Gillis NP. LDAs entered. PCXR entered.

## 2021-12-21 NOTE — PROGRESS NOTES
Pharmacokinetic Initial Assessment: IV Vancomycin    Assessment/Plan:    Initiate intravenous vancomycin with loading dose of 1250 mg once with subsequent doses when random concentrations are less than 20 mcg/mL  Desired empiric serum trough concentration is 15 to 20 mcg/mL  Draw vancomycin random level on 12/22 with AM labs.  Pharmacy will continue to follow and monitor vancomycin.      Please contact pharmacy at extension [phone] with any questions regarding this assessment.     Thank you for the consult,   Frances Alvarenga       Patient brief summary:  Selma Lux is a 86 y.o. female initiated on antimicrobial therapy with IV Vancomycin for treatment of suspected bacteremia    Drug Allergies:   Review of patient's allergies indicates:  No Known Allergies    Actual Body Weight:   67 kg    Renal Function:   Estimated Creatinine Clearance: 39.4 mL/min (based on SCr of 1 mg/dL).,     Dialysis Method (if applicable):  N/A    CBC (last 72 hours):  Recent Labs   Lab Result Units 12/20/21  1007 12/21/21  0248   WBC K/uL 24.32* 27.96*   Hemoglobin g/dL 10.5* 10.7*   Hematocrit % 34.7* 36.8*   Platelets K/uL 93* 88*   Gran % % 82.4*  --    Lymph % % 2.5*  --    Mono % % 11.2  --    Eosinophil % % 0.2  --    Basophil % % 0.1  --    Differential Method  Automated  --        Metabolic Panel (last 72 hours):  Recent Labs   Lab Result Units 12/19/21  0637 12/20/21  1007 12/21/21  0248   Sodium mmol/L 149* 146* 147*   Potassium mmol/L 4.0 4.0 4.2   Chloride mmol/L 110 110 109   CO2 mmol/L 31* 31* 21*   Glucose mg/dL 99 197* 215*   BUN mg/dL 40* 36* 38*   Creatinine mg/dL 1.0 0.8 1.0   Albumin g/dL 3.6 3.1* 3.1*   Total Bilirubin mg/dL 1.8* 1.6* 1.5*   Alkaline Phosphatase U/L 54* 54* 71   AST U/L 19 13 34   ALT U/L 25 23 36   Magnesium mg/dL  --  2.1 2.3   Phosphorus mg/dL  --  2.1* 5.7*       Drug levels (last 3 results):  No results for input(s): VANCOMYCINRA, VANCOMYCINPE, VANCOMYCINTR in the last 72 hours.    Microbiologic  Results:  Microbiology Results (last 7 days)     Procedure Component Value Units Date/Time    Blood culture [[id number]]     Order Status: No result Specimen: Blood     Blood culture [[id number]]     Order Status: No result Specimen: Blood

## 2021-12-21 NOTE — NURSING
[facility] - Neuro Critical Care  ICU Multidisciplinary Bedside Rounds   SUMMARY     Date: 12/21/2021    Prehospitalization: Home  Admit Date / LOS : 12/13/2021/ 8 days    Diagnosis: SDH (subdural hematoma)    Consults:        Active: Neuro, OT, PT, RD and SW        Needed: N/A     Code Status: Full Code   Advanced Directive: <no information>    LDA: Art Line, Hill, NG, PICC and Vent       Central Lines/Site/Justification:Multiple GTTS       Urinary Cath/Order/Justification:Critically Ill in the ICU and requiring intensive monitoring    Vasopressors/Infusions:    sodium chloride 0.9% Stopped (12/21/21 0609)    fentanyl 100 mcg/hr (12/21/21 0637)    niCARdipine      NORepinephrine bitartrate-D5W 0.04 mcg/kg/min (12/21/21 0637)          GOALS: Volume/ Hemodynamic: MAP >65                     RASS: -2  light sedation, briefly awakes to voice (eye opening)    CAM ICU: N/A  Pain Management: IV       Pain Controlled: yes     Rhythm: ST    Respiratory Device: Vent    Vent Mode: A/C  Oxygen Concentration (%):  [35-70] 70  Resp Rate Total:  [18 br/min-27 br/min] 27 br/min  Vt Set:  [450 mL] 450 mL  PEEP/CPAP:  [6 cmH20] 6 cmH20  Mean Airway Pressure:  [10 ffR99-89 cmH20] 10 cmH20             Most Recent SBT/ SAT: Did not perform       MOVE Screen: PT Consult    VTE Prophylaxis: Pharm and Mechanical  Mobility: Bedrest  Stress Ulcer Prophylaxis: Yes    Dietary: TF  Tolerance: yes  /  Advancement: @ goal    Isolation: No active isolations    Restraints: No    Significant Dates:  Post Op Date: N/A  Rescue Date: 12/21/2021  Imaging/ Diagnostics: CT SCAN OF HEAD    Noteworthy Labs:  WBC, NA    CBC/Anemia Labs: Coags:    Recent Labs   Lab 12/20/21  1007 12/20/21  1007 12/21/21  0248 12/21/21  0304   WBC 24.32*  --  27.96*  --    HGB 10.5*  --  10.7*  --    HCT 34.7*   < > 36.8* 33*   PLT 93*  --  88*  --    MCV 93  --  97  --    RDW 15.9*  --  16.4*  --     < > = values in this interval not displayed.    Recent Labs   Lab  12/21/21  0255   INR 1.1   APTT 26.2        Chemistries:   Recent Labs   Lab 12/20/21  1007 12/21/21  0248   * 147*   K 4.0 4.2    109   CO2 31* 21*   BUN 36* 38*   CREATININE 0.8 1.0   CALCIUM 9.0 8.8   PROT 4.9* 5.3*   BILITOT 1.6* 1.5*   ALKPHOS 54* 71   ALT 23 36   AST 13 34   MG 2.1 2.3   PHOS 2.1* 5.7*        Cardiac Enzymes: Ejection Fractions:    No results for input(s): CPK, CPKMB, MB, TROPONINI in the last 72 hours. EF   Date Value Ref Range Status   12/13/2021 65 % Final        POCT Glucose: HbA1c:    Recent Labs   Lab 12/17/21  1311 12/18/21  1419   POCTGLUCOSE 117* 198*    Hemoglobin A1C   Date Value Ref Range Status   12/13/2021 5.3 4.0 - 5.6 % Final     Comment:     ADA Screening Guidelines:  5.7-6.4%  Consistent with prediabetes  >or=6.5%  Consistent with diabetes    High levels of fetal hemoglobin interfere with the HbA1C  assay. Heterozygous hemoglobin variants (HbS, HgC, etc)do  not significantly interfere with this assay.   However, presence of multiple variants may affect accuracy.          PT S/P CODE THIS AM FOR RESPIRATORY ARREST FOLLOWED BY [facility]. PLEASE SEE CODE SHEET FOR DETAILS. FENTANYL AND LEVOPHED INFUSING THROUGH RT PICC, LT NARE NG TUBE CLAMPED. PT REMAINS ON RICARDO NOUS EEG.   DAUGHTER AT BEDSIDE. ALL QUESTIONS AND CONCERNS ADDRESSED. ALL ORDERS FOR 7P-7A FULFILLED.     ICU LOS 7d 16h  Level of Care: Critical Care

## 2021-12-21 NOTE — PROGRESS NOTES
[facility] Neuro Critical [facility]  Neurocritical Care  Progress Note    Admit Date: 12/13/2021  Service Date: 12/21/2021  Length of Stay: 8    Subjective:     Chief Complaint: SDH (subdural hematoma)    History of Present Illness:   The patient is an 86-year-old female with past medical history of TIA, RA and Afib of Eliquis admitted to [facility] with Right SDH.  She was found on the floor this morning only responding to painful stimuli.  Patient occasionally says her name.GCS 12 noted for EMS.  PCC administered. She had a mechanical fall out of bed on Thursday hitting her head and has been complaining of intermittent headaches since. Her last dose of eliquis was on 12/11. Pending repeat CT head. NSGY consulted. In addition patient was found to be hyponatremic at 118-central line placed and hypertonic saline infusion initiated. Pending repeat CT head. Patient admitted to [facility] for close monitoring and higher level of care.       Hospital Course: 12/13/2021: patient admitted to [facility] s/p fall on 12/11 on Eliquis  12/14/2021 wheezing, prolonged expiratory phase.   12/15/2021 copious respiratory secretions. Remains encephalopathic. Review EEG  12/16/2021 improved respiratory secretions. D/c nasal tracheal airway if minimal secretions.   12/17/2021 restless.   12/18/2021 lethargic, rising BUN/Cr ratio. Fluid resuscitation. Received flumazenil for BDZ   12/19/2021 encephalopathy overnight. Work up neg so far. BUN/Cr >20 from volume contraction.   12/20/2021 left facial twitching overnight. Loaded with vimpat.   12/21/2021 event overnight cardiac arrest/PEA likely related to respiratory distress. Intubated.       Review of Symptoms: encephalopathic cannot participate    Medications:  Continuous Infusions:   sodium chloride 0.9% 50 mL/hr at 12/21/21 0900    fentanyl 100 mcg/hr (12/21/21 0900)    niCARdipine      NORepinephrine bitartrate-D5W Stopped (12/21/21 0745)     Scheduled Meds:   albuterol-ipratropium  3 mL Nebulization Q4H     amLODIPine  10 mg Per NG tube Daily    atorvastatin  40 mg Per NG tube Daily    ceFEPime (MAXIPIME) IVPB  1 g Intravenous Q12H    heparin (porcine)  5,000 Units Subcutaneous Q8H    hydrOXYchloroQUINE  200 mg Per NG tube BID    lacosamide (VIMPAT) IVPB  100 mg Intravenous Q12H    methylPREDNISolone sodium succinate injection  20 mg Intravenous Daily    metronidazole  500 mg Intravenous Q8H    montelukast  10 mg Per NG tube Daily    mycophenolate mofetil  500 mg Per NG tube BID    senna-docusate 8.6-50 mg  1 tablet Per NG tube Daily    sodium chloride 0.9%  250 mL Intravenous Once    sodium chloride 0.9%  10 mL Intravenous Q6H     PRN Meds:.acetaminophen, albuterol-ipratropium, calcium gluconate IVPB, calcium gluconate IVPB, calcium gluconate IVPB, dextrose 50%, glucagon (human recombinant), insulin aspart U-100, labetalol, magnesium sulfate IVPB, magnesium sulfate IVPB, niCARdipine, ondansetron, potassium chloride in water **AND** potassium chloride in water **AND** potassium chloride in water, Flushing PICC Protocol **AND** sodium chloride 0.9% **AND** sodium chloride 0.9%, Pharmacy to dose Vancomycin consult **AND** vancomycin - pharmacy to dose    OBJECTIVE:   Vital Signs (Most Recent):   Temp: 98 °F (36.7 °C) (12/21/21 0700)  Pulse: 100 (12/21/21 0900)  Resp: 13 (12/21/21 0900)  BP: 134/61 (12/21/21 0200)  SpO2: 100 % (12/21/21 0900)    Vital Signs (24h Range):   Temp:  [98 °F (36.7 °C)-98.6 °F (37 °C)] 98 °F (36.7 °C)  Pulse:  [] 100  Resp:  [12-75] 13  SpO2:  [89 %-100 %] 100 %  BP: (112-162)/() 134/61  Arterial Line BP: ()/(0-83) 128/63    ICP/CPP (Last 24h):        I & O (Last 24h):     Intake/Output Summary (Last 24 hours) at 12/21/2021 0954  Last data filed at 12/21/2021 0900  Gross per 24 hour   Intake 2138.06 ml   Output 100 ml   Net 2038.06 ml     Physical Exam:   GA: awake, comfortable, no acute distress.   HEENT: No scleral icterus or JVD. Neck supple  Pulmonary: Air  entry equal to auscultation A/P/L. Wheezing +, crackles +  Cardiac: RRR, S1 & S2 w/o rubs/murmurs/gallops.   Abdominal: soft, non-tender, bowel sounds present x 4. No appreciable hepatosplenomegaly.  Skin: No jaundice, rashes, or visible lesions.  Neuro:  --Mental Status:  awake, follows one step commands. Able to show two fingers Spontaneous eye opening.   --Pupils 3.5 mm, PERRL.   --Corneal reflex not done in this arousable patient, gag, cough intact.  Decreased movement on the left UE and LE  MSK:  No edema in UE and LE    Vent Data:   Vent Mode: A/C  Oxygen Concentration (%):  [35-70] 70  Resp Rate Total:  [18 br/min-27 br/min] 18 br/min  Vt Set:  [450 mL] 450 mL  PEEP/CPAP:  [6 cmH20] 6 cmH20  Mean Airway Pressure:  [9.8 nwA41-46 cmH20] 9.8 cmH20    Lines/Drains/Airway:        Airway - Non-Surgical 12/21/21 0239 Endotracheal Tube (Active)   Secured at 24 cm 12/21/21 0805   Measured At Lips 12/21/21 0805   Secured Location Right 12/21/21 0805   Secured by Commercial tube fajardo 12/21/21 0805   Bite Block right;secure and patent 12/21/21 0805   Site Condition Cool;Dry;Redness 12/21/21 0805   Status Intact;Secured;Patent 12/21/21 0805   Site Assessment Clean;Dry;Red 12/21/21 0805   Cuff Pressure 27 cm H2O 12/21/21 0805          Airway Anesthesia 12/21/21 (Active)      PICC Double Lumen 12/15/21 1616 right basilic (Active)   Verification by X-ray Yes 12/21/21 0700   Site Assessment No drainage;No redness;No swelling;No warmth 12/21/21 0301   Extremity Assessment Distal to IV No abnormal discoloration;No redness;No swelling;No warmth 12/21/21 0301   Line Securement Device Secured with sutureless device 12/21/21 0301   Dressing Type Biopatch in place;Central line dressing 12/21/21 0301   Dressing Status Clean;Dry;Intact 12/21/21 0301   Dressing Intervention Integrity maintained 12/21/21 0301   Date on Dressing 12/15/21 12/21/21 0700   Dressing Due to be Changed 12/22/21 12/21/21 0700   Left Lumen Patency/Care Flushed  w/o difficulty;Normal saline locked 12/21/21 0301   Right Lumen Patency/Care Flushed w/o difficulty;Normal saline locked 12/21/21 0301   Current Insertion Depth (cm) 33 cm 12/15/21 1616   Current Exposed Catheter (cm) 0 cm 12/15/21 1616   Extremity Circumference (cm) 33 cm 12/15/21 1616   Waveform Normal 12/17/21 1501   Line Necessity Review Poor venous access;Medication caustic to vasculature;Longterm central access required;Incompatible infusions;Hemodynamic instability 12/21/21 0301      Arterial Line 12/21/21 0303 Left Brachial (Active)   Site Assessment Clean;Dry;Intact;No redness;No swelling 12/21/21 0700   Line Status Pulsatile blood flow 12/21/21 0700   Art Line Waveform Appropriate 12/21/21 0700   Arterial Line Interventions Zeroed and calibrated;Leveled;Tubing changed;Connections checked and tightened;Flushed per protocol 12/21/21 0700   Color/Movement/Sensation Capillary refill less than 3 sec 12/21/21 0700   Dressing Type Biopatch in place 12/21/21 0700   Dressing Status Clean;Dry;Intact 12/21/21 0700   Dressing Intervention Integrity maintained 12/21/21 0700   Dressing Change Due 12/28/21 12/21/21 0700   Tubing Change Due 12/28/21 12/21/21 0700           NG/OG Tube 12/17/21 1405 Left nostril (Active)   Placement Check placement verified by x-ray 12/21/21 0700   Tolerance no signs/symptoms of discomfort 12/21/21 0301   Securement secured to nostril center 12/21/21 0301   Clamp Status/Tolerance no abdominal discomfort;no abdominal distention;no emesis;no nausea;no restlessness 12/21/21 0301   Suction Setting/Drainage Method suction at the bedside 12/19/21 1501   Insertion Site Appearance no redness, warmth, tenderness, skin breakdown, drainage 12/19/21 1501   Flush/Irrigation flushed w/;water;no resistance met 12/19/21 1501   Feeding Type continuous;by pump 12/20/21 2301   Feeding Action feeding held 12/21/21 0700   Current Rate (mL/hr) 40 mL/hr 12/20/21 2301   Goal Rate (mL/hr) 40 mL/hr 12/20/21 5043  "  Intake (mL) 100 mL 12/20/21 0800   Water Bolus (mL) 200 mL 12/21/21 0001   Formula Name ISOSOURCE 12/21/21 0301   Intake (mL) - Formula Tube Feeding 40 12/21/21 0100            Urethral Catheter 12/21/21 0300 Temperature probe 16 Fr. (Active)   Site Assessment Clean;Intact 12/21/21 0700   Collection Container Urimeter 12/21/21 0700   Securement Method secured to top of thigh w/ adhesive device 12/21/21 0301   Catheter Care Performed yes 12/21/21 0301   Reason for Continuing Urinary Catheterization Critically ill in ICU and requiring hourly monitoring of intake/output 12/21/21 0301   CAUTI Prevention Bundle StatLock in place w 1" slack;Intact seal between catheter & drainage tubing;Drainage bag/urimeter off the floor;Sheeting clip in use;No dependent loops or kinks;Drainage bag/urimeter not overfilled (<2/3 full);Drainage bag/urimeter below bladder 12/21/21 0301   Output (mL) 50 mL 12/21/21 0800       Nutrition/Tube Feeds (if NPO state why): resume tf    Labs:  ABG:   Recent Labs   Lab 12/21/21  0304   PH 7.427   PO2 471*   PCO2 40.6   HCO3 26.8   POCSATURATED 100   BE 2     BMP:  Recent Labs   Lab 12/21/21  0248   *   K 4.2      CO2 21*   BUN 38*   CREATININE 1.0   *   MG 2.3   PHOS 5.7*     LFT:   Lab Results   Component Value Date    AST 34 12/21/2021    ALT 36 12/21/2021    ALKPHOS 71 12/21/2021    BILITOT 1.5 (H) 12/21/2021    ALBUMIN 3.1 (L) 12/21/2021    PROT 5.3 (L) 12/21/2021     CBC:   Lab Results   Component Value Date    WBC 27.96 (H) 12/21/2021    HGB 10.7 (L) 12/21/2021    HCT 33 (L) 12/21/2021    MCV 97 12/21/2021    PLT 88 (L) 12/21/2021     Microbiology x 7d:   Microbiology Results (last 7 days)     Procedure Component Value Units Date/Time    Blood culture [[id number]]     Order Status: No result Specimen: Blood     Blood culture [[id number]]     Order Status: No result Specimen: Blood         Imaging:  CXR 12/15 - improved interstitial edema. Lines in place. Widened mediastinum " (POA)  I personally reviewed the above image.  Today I independently reviewed pertinent medications, lines/drains/airways, imaging, cardiology results, laboratory results, microbiology results, notably:   ASSESSMENT/PLAN:     Active Hospital Problems    Diagnosis    *SDH (subdural hematoma)    Encephalopathy    Twitching    Stroke    Hyponatremia    Age-related physical debility    Leukocytosis    RA (rheumatoid arthritis)      Neuro:   SDH with brain compression  Acute encephalopathy likely multifactorial  Repeat CT head 12/18 - crowding and mass effect of right hemisphere from overlying SDH.   MRI brain 12/19 - demonstrated SDH with mass effect. No large territory ischemia to explain neurological decline  Received flumazenil 12/18 for ativan reversal.   Loaded with vimpat for left facial twitching on 12/19.   Review cEEG with epilepsy team  Fentanyl for vent comfort    Pulmonary:   Solumedrol (conversion from home regimen of prednisone)  Duonebs/3% nacl/mucomyst  CXR/ABG daily      Cardiac:   SBP goal <160mmhg  EF 65%  Post cardiac arrest/PEA - awake, tracks visually    Renal:    BUN/Cr >20 improving following fluid resuscitation  Hypernatremia - likely dehydration.  Repeat FWF 500ml x1 then increase 200cc q4hrly    ID:   Afebrile, leucocytosis  Panculture, broad spectrum antibiotics  Check procalcitonin  Will monitor    Hem/Onc:   Transfuse for Hb <7 or 8 if associated with hypotension  plt count slight down trend but stable    Endocrine:    Hyperglycemia - start sliding scale if BS>200 schedule insulin aspart 2units q4hrly  HbA1c 5.3%     Fluids/Electrolytes/Nutrition/GI:   Replete lytes as needed  tf  Lines:  Art +  CVC +  ETT NA  Hill + for strict I/O  NG +  PEG NA    Proph:  DVT:SCD/heparin  Constipation:  Last output:bowel regimen as needed  PUP: NA  VAP: NA    Family updated at bedside    Uninterrupted Critical Care/Counseling Time (not including procedures):: 38 mins    Juan Irwin MD,  FNCS  Neurocritical care attending    Full Code    Juan Irwin MD  Neurocritical Care  [facility] - Neuro Critical Care

## 2021-12-21 NOTE — PT/OT/SLP DISCHARGE
Occupational Therapy Discharge Summary    Selma Lux  MRN: [id number]   Principal Problem: SDH (subdural hematoma)      Patient Discharged from acute Occupational Therapy on 12/21/2021.  Please refer to prior OT note  for functional status.    Assessment:      Patient transferred to lower level of care secondary to PEA overnight.    Objective:     GOALS:   Multidisciplinary Problems     Occupational Therapy Goals        Problem: Occupational Therapy Goal    Goal Priority Disciplines Outcome Interventions   Occupational Therapy Goal     OT, PT/OT Ongoing, Progressing    Description: Goals to be met by: 12/26/2021 (10 days)     Patient will increase functional independence with ADLs by performing:    UE Dressing with Moderate Assistance.  LE Dressing with Maximum Assistance.  Grooming while seated with Minimal Assistance.  Toileting from bedside commode with Minimal Assistance for hygiene and clothing management.   Sitting at edge of bed x10 minutes with Minimal Assistance.  Rolling to Bilateral with Minimal Assistance.   Supine to sit with Minimal Assistance.  Stand pivot transfers with Moderate Assistance.  Step transfer with Moderate Assistance  Toilet transfer to bedside commode with Moderate Assistance.                     Reasons for Discontinuation of Therapy Services  Patient is unable to continue work toward goals because of medical or psychosocial complications.      Plan:     Patient Discharged to: neuro ICU, on ventilator. Will need new orders to continue care when ready    12/21/2021

## 2021-12-21 NOTE — CONSULTS
[facility] Neuro Critical Care  Neurology-Epilepsy  Consult Note    Patient Name: Selma Lux  MRN: [id number]  Admission Date: 12/13/2021  Hospital Length of Stay: 8 days  Code Status: Full Code    Attending Provider: Juan Irwin MD   Consulting Provider: Amber Singletary PA-C  Primary Care Physician: Bhargav Hirsch MD  Principal Problem:Encephalopathy    Consults   Subjective:     HPI:   86 year old female with a PMHx of atrial fibrillation on Eliquis, TIA, RA who initially presented to the ER on 12/13 via EMS for altered mental status. HPI per chart review. Patient found on the floor only responding to painful stimuli. Reported mechanical fall out of bed on Thursday prior with head trauma and subsequent complaints of headache. CTH revealed right subdural hematoma with mass effect and leftward midline shift. Neurosurgery was consulted and patient was admitted to [facility] for higher level of care. Hospital course complicated by copious respiratory secretions, encephalopathy, acute kidney injury, facial twitching for which patient received Lacosamide, and PEA arrest 12/21 with subsequent intubation.       Hospital Course:   Patient monitored on EEG multiples times on 12/14, 12/15, 12/18, 12/19, 12/20, and 12/21- no electrographic seizures seen. 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        History reviewed. No pertinent past medical history.    History reviewed. No pertinent surgical history.    Review of patient's allergies indicates:  No Known Allergies    No current facility-administered medications on file prior to encounter.     Current Outpatient Medications on File Prior to Encounter   Medication Sig    amLODIPine (NORVASC) 10 MG tablet Take 10 mg by mouth once daily.    atorvastatin (LIPITOR) 40 MG tablet Take 40 mg by mouth once  daily.    baclofen (LIORESAL) 10 MG tablet Take 10 mg by mouth 3 (three) times daily.    ELIQUIS 5 mg Tab Take 5 mg by mouth 2 (two) times daily.    furosemide (LASIX) 40 MG tablet Take 40 mg by mouth once daily.    gabapentin (NEURONTIN) 300 MG capsule Take 300 mg by mouth once daily.    hydrOXYchloroQUINE (PLAQUENIL) 200 mg tablet Take 200 mg by mouth 2 (two) times daily.    montelukast (SINGULAIR) 10 mg tablet Take 10 mg by mouth once daily.    mycophenolate (CELLCEPT) 500 mg Tab Take 500 mg by mouth 2 (two) times daily.    NURTEC 75 mg odt Take by mouth.    predniSONE (DELTASONE) 5 MG tablet Take 5 mg by mouth once daily.    sumatriptan (IMITREX) 50 MG tablet Take by mouth.     Continuous Infusions:   fentanyl Stopped (12/21/21 0957)    NORepinephrine bitartrate-D5W Stopped (12/21/21 0745)       Family History    None       Tobacco Use    Smoking status: Not on file    Smokeless tobacco: Not on file   Substance and Sexual Activity    Alcohol use: Not on file    Drug use: Not on file    Sexual activity: Not on file     Review of Systems   Unable to perform ROS: Intubated     Objective:     Vital Signs (Most Recent):  Temp: 98 °F (36.7 °C) (12/21/21 0700)  Pulse: 104 (12/21/21 1400)  Resp: 18 (12/21/21 1400)  BP: 134/61 (12/21/21 0200)  SpO2: 100 % (12/21/21 1400) Vital Signs (24h Range):  Temp:  [98 °F (36.7 °C)-98.3 °F (36.8 °C)] 98 °F (36.7 °C)  Pulse:  [] 104  Resp:  [10-75] 18  SpO2:  [89 %-100 %] 100 %  BP: (120-147)/() 134/61  Arterial Line BP: ()/(0-83) 142/67     Weight: 67 kg (147 lb 11.3 oz)  Body mass index is 25.35 kg/m².    Physical Exam  Constitutional:       General: She is not in acute distress.     Appearance: She is not diaphoretic.      Interventions: She is intubated.   HENT:      Head: Normocephalic and atraumatic.      Comments: EEG in place  Eyes:      General: No scleral icterus.        Right eye: No discharge.         Left eye: No discharge.       Conjunctiva/sclera: Conjunctivae normal.      Pupils: Pupils are equal, round, and reactive to light.   Cardiovascular:      Rate and Rhythm: Normal rate.   Pulmonary:      Effort: Pulmonary effort is normal. No respiratory distress. She is intubated.      Comments: Intubated  Abdominal:      General: There is no distension.      Palpations: Abdomen is soft.      Tenderness: There is no abdominal tenderness.   Musculoskeletal:         General: No deformity or signs of injury.   Skin:     General: Skin is warm and dry.   Psychiatric:      Comments: Unable to assess         NEUROLOGICAL EXAMINATION:     CRANIAL NERVES     CN III, IV, VI   Pupils are equal, round, and reactive to light.       Arouses to verbal stimuli   FERMÍN OU   Corneal intact OU   + spontaneous eye opening   Face symmetric   Tongue midline   Follows simple commands on R  Spontaneous movements on R  No spontaneous movements on L    Exam findings to suggest seizures:  Myoclonus - no   eye twitching - no   Nystagmus - no   gaze deviation - no   waxy rigidity - no        Significant Labs: All pertinent lab results from the past 24 hours have been reviewed.    Significant Studies: I have reviewed all pertinent imaging results/findings within the past 24 hours.    Assessment and Plan:     * Encephalopathy  86F PMHx of atrial fibrillation on Eliquis, TIA, and RA who initially presented to the ER on 12/13 via EMS for AMS. Per chart, patient found on the floor only responding to painful stimuli with reported mechanical fall out of bed on Thursday prior with head trauma and subsequent complaints of headache. CTH revealed R SDH with mass effect and L MLS. Neurosurgery was consulted and patient was admitted to [facility] for higher level of care. Hospital course c/b copious respiratory secretions, encephalopathy, KERMIT, facial twitching for which patient received Lacosamide, and PEA arrest 12/21 with subsequent intubation. Patient monitored on EEG multiples times on  12/14, 12/15, 12/18, 12/19, 12/20, and 12/21- no electrographic seizures seen. 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     Recommendations:  - Discontinue vEEG monitoring  - Recommend to continue Lacosamide 100 mg BID  - Avoid agents that lower seizure threshold  - Management of infectious/metabolic abnormalities per NCC  - Seizure precautions      Discussed plan of care with NCC team and daughters at bedside. Will sign off, please call with questions.    Cardiac arrest  - s/p PEA arrest on 12/21 with subsequent intubation  - Management per NCC    Leukocytosis  - WBC uptrending, afebrile  - On Cefepime, Flagyl  - Management per [facility]    SDH (subdural hematoma)  - P/w AMS on 12/13-> CTH revealed R SDH  with mass effect and L MLS   - CTH 12/21 shows no detrimental change from prior, evolving mixed density subdural over R hemisphere associated with mass effect including sulci effacement and 0.5 cm L MLS, stable SDH over R tentorial leaflet, remote punctate infarcts in R cerebellar hemisphere and R tatyana   - Management per NSGY, NCC      VTE Risk Mitigation (From admission, onward)         Ordered     heparin (porcine) injection 5,000 Units  Every 8 hours         12/16/21 1008                Thank you for your consult. I will sign off. Please contact us if you have any additional questions.    Amber Singletary PA-C  Neurology-Epilepsy  [facility] - Neuro Critical [facility]  Staff: Dr. Goss

## 2021-12-21 NOTE — PROGRESS NOTES
[facility]  Neurosurgery  Progress Note    Subjective:     History of Present Illness: 86-year-old female with past medical history of TIA comes to the emergency department for AMS.  She was found on the floor about 1 hour ago only responding to painful stimuli.  Patient occasionally says her name.GCS 12 noted for EMS.  Patient does take blood thinners. She had a mechanical fall out of bed on Thursday hitting her head and has been complaining of intermittent headaches since. Her last dose of eliquis was on 12/11. NSGY consulted for CTH with SDH      Post-Op Info:  * No surgery found *         Interval History: PEA arrest overnight following episode of agitation. CTH grossly stable.     Medications:  Continuous Infusions:   sodium chloride 0.9% Stopped (12/21/21 0757)    fentanyl 100 mcg/hr (12/21/21 0800)    niCARdipine      NORepinephrine bitartrate-D5W Stopped (12/21/21 0745)     Scheduled Meds:   albuterol-ipratropium  3 mL Nebulization Q4H    amLODIPine  10 mg Per NG tube Daily    atorvastatin  40 mg Per NG tube Daily    ceFEPime (MAXIPIME) IVPB  1 g Intravenous Q12H    heparin (porcine)  5,000 Units Subcutaneous Q8H    hydrOXYchloroQUINE  200 mg Per NG tube BID    lacosamide (VIMPAT) IVPB  100 mg Intravenous Q12H    metronidazole  500 mg Intravenous Q8H    montelukast  10 mg Per NG tube Daily    mycophenolate mofetil  500 mg Per NG tube BID    predniSONE  25 mg Per NG tube Daily    senna-docusate 8.6-50 mg  1 tablet Per NG tube Daily    sodium chloride 0.9%  250 mL Intravenous Once    sodium chloride 0.9%  10 mL Intravenous Q6H     PRN Meds:acetaminophen, albuterol-ipratropium, calcium gluconate IVPB, calcium gluconate IVPB, calcium gluconate IVPB, labetalol, magnesium sulfate IVPB, magnesium sulfate IVPB, niCARdipine, ondansetron, potassium chloride in water **AND** potassium chloride in water **AND** potassium chloride in water, Flushing PICC Protocol **AND** sodium  chloride 0.9% **AND** sodium chloride 0.9%, Pharmacy to dose Vancomycin consult **AND** vancomycin - pharmacy to dose     Review of Systems  Objective:     Weight: 67 kg (147 lb 11.3 oz)  Body mass index is 25.35 kg/m².  Vital Signs (Most Recent):  Temp: 98 °F (36.7 °C) (12/21/21 0700)  Pulse: 99 (12/21/21 0805)  Resp: 18 (12/21/21 0805)  BP: 134/61 (12/21/21 0200)  SpO2: 100 % (12/21/21 0805) Vital Signs (24h Range):  Temp:  [98 °F (36.7 °C)-98.6 °F (37 °C)] 98 °F (36.7 °C)  Pulse:  [] 99  Resp:  [12-75] 18  SpO2:  [89 %-100 %] 100 %  BP: (112-162)/() 134/61  Arterial Line BP: ()/(0-83) 123/64     Date 12/21/21 0700 - 12/22/21 0659   Shift 9967-2628 3989-2561 0143-3587 24 Hour Total   INTAKE   I.V.(mL/kg) 32.8(0.5)   32.8(0.5)   IV Piggyback 185.9   185.9   Shift Total(mL/kg) 218.7(3.3)   218.7(3.3)   OUTPUT   Urine(mL/kg/hr) 100   100   Shift Total(mL/kg) 100(1.5)   100(1.5)   Weight (kg) 67 67 67 67              Vent Mode: A/C  Oxygen Concentration (%):  [35-70] 70  Resp Rate Total:  [18 br/min-27 br/min] 18 br/min  Vt Set:  [450 mL] 450 mL  PEEP/CPAP:  [6 cmH20] 6 cmH20  Mean Airway Pressure:  [9.8 yrF17-82 cmH20] 9.8 cmH20         NG/OG Tube 12/17/21 1405 Left nostril (Active)   Placement Check placement verified by x-ray 12/21/21 0700   Tolerance no signs/symptoms of discomfort 12/21/21 0301   Securement secured to nostril center 12/21/21 0301   Clamp Status/Tolerance no abdominal discomfort;no abdominal distention;no emesis;no nausea;no restlessness 12/21/21 0301   Suction Setting/Drainage Method suction at the bedside 12/19/21 1501   Insertion Site Appearance no redness, warmth, tenderness, skin breakdown, drainage 12/19/21 1501   Flush/Irrigation flushed w/;water;no resistance met 12/19/21 1501   Feeding Type continuous;by pump 12/20/21 2301   Feeding Action feeding held 12/21/21 0700   Current Rate (mL/hr) 40 mL/hr 12/20/21 2301   Goal Rate (mL/hr) 40 mL/hr 12/20/21 2301   Intake (mL) 100  "mL 12/20/21 0800   Water Bolus (mL) 200 mL 12/21/21 0001   Formula Name ISOSOURCE 12/21/21 0301   Intake (mL) - Formula Tube Feeding 40 12/21/21 0100            Urethral Catheter 12/21/21 0300 Temperature probe 16 Fr. (Active)   Site Assessment Clean;Intact 12/21/21 0700   Collection Container Urimeter 12/21/21 0700   Securement Method secured to top of thigh w/ adhesive device 12/21/21 0301   Catheter Care Performed yes 12/21/21 0301   Reason for Continuing Urinary Catheterization Critically ill in ICU and requiring hourly monitoring of intake/output 12/21/21 0301   CAUTI Prevention Bundle StatLock in place w 1" slack;Intact seal between catheter & drainage tubing;Drainage bag/urimeter off the floor;Sheeting clip in use;No dependent loops or kinks;Drainage bag/urimeter not overfilled (<2/3 full);Drainage bag/urimeter below bladder 12/21/21 0301   Output (mL) 50 mL 12/21/21 0800       Neurosurgery Physical Exam   [id number]  PERRL  +c/c/g  FCx4  GUEVARA spontaneously      Significant Labs:  Recent Labs   Lab 12/20/21  1007 12/21/21  0248   * 215*   * 147*   K 4.0 4.2    109   CO2 31* 21*   BUN 36* 38*   CREATININE 0.8 1.0   CALCIUM 9.0 8.8   MG 2.1 2.3     Recent Labs   Lab 12/20/21  1007 12/21/21  0248 12/21/21  0304   WBC 24.32* 27.96*  --    HGB 10.5* 10.7*  --    HCT 34.7* 36.8* 33*   PLT 93* 88*  --      Recent Labs   Lab 12/21/21  0255   INR 1.1   APTT 26.2     Microbiology Results (last 7 days)     Procedure Component Value Units Date/Time    Blood culture [[id number]]     Order Status: No result Specimen: Blood     Blood culture [[id number]]     Order Status: No result Specimen: Blood         Significant Diagnostics:  I have reviewed and interpreted all pertinent imaging results/findings within the past 24 hours.    Assessment/Plan:     * SDH (subdural hematoma)  86F with on eliquis s/p head trauma 1 week prior now with small aSDH and AMS c/b PEA arrest 12/20:    --admitted to NCC  --CTH: R " temp/paretial aSDH 5-6mm thickness, 3-5mm MLS, stable on repeat as well as imaging post PEA arrest  --holding eliquis  --f/up cEEG  --AED per Epilepsy  --SBP<150, HOB > 30  --Na >135,   --ok for SQH  --no acute neurosurgical intervention at this time, PEA arrest w/up per NCC  --Please page NSGY immediately for any changes in neuro status    Please contact the on call neurosurgery provider with questions or concerns. Provider may be reached via  or during weekday call only via [employer] [phone].     D/w Dr Nhan Saravia MD  Neurosurgery  [facility]

## 2021-12-21 NOTE — ASSESSMENT & PLAN NOTE
- P/w AMS on 12/13-> CTH revealed R SDH  with mass effect and L MLS   - CTH 12/21 shows no detrimental change from prior, evolving mixed density subdural over R hemisphere associated with mass effect including sulci effacement and 0.5 cm L MLS, stable SDH over R tentorial leaflet, remote punctate infarcts in R cerebellar hemisphere and R tatyana   - Management per NSGY, NCC

## 2021-12-21 NOTE — HPI
86 year old female with a PMHx of atrial fibrillation on Eliquis, TIA, RA who initially presented to the ER on 12/13 via EMS for altered mental status. HPI per chart review. Patient found on the floor only responding to painful stimuli. Reported mechanical fall out of bed on Thursday prior with head trauma and subsequent complaints of headache. CTH revealed right subdural hematoma with mass effect and leftward midline shift. Neurosurgery was consulted and patient was admitted to [facility] for higher level of care. Hospital course complicated by copious respiratory secretions, encephalopathy, acute kidney injury, facial twitching for which patient received Lacosamide, and PEA arrest 12/21 with subsequent intubation.

## 2021-12-21 NOTE — HOSPITAL COURSE
Patient monitored on EEG multiples times on 12/14, 12/15, 12/18, 12/19, 12/20, and 12/21- no electrographic seizures seen. 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

## 2021-12-21 NOTE — PLAN OF CARE
[facility]  Discharge Reassessment    Primary Care Provider: Bhargav Hirsch MD    Expected Discharge Date: 12/29/2021    Per MD, patient with PEA overnight- Intubated.  Not medically ready for discharge.       Reassessment (most recent)     Discharge Reassessment - 12/21/21 1309        Discharge Reassessment    Assessment Type Discharge Planning Reassessment     Did the patient's condition or plan change since previous assessment? No     Communicated LETICIA with patient/caregiver Date not available/Unable to determine     Discharge Plan A Long-term acute care facility (LTAC)     Discharge Plan B Rehab     DME Needed Upon Discharge  none     Discharge Barriers Identified None     Why the patient remains in the hospital Requires continued medical care               Charlette Benitez RN, CCRN-K, [facility]  Neuro-Critical Care   [phone]

## 2021-12-22 LAB
ALBUMIN SERPL BCP-MCNC: 3 G/DL (ref 3.5–5.2)
ALP SERPL-CCNC: 59 U/L (ref 55–135)
ALT SERPL W/O P-5'-P-CCNC: 32 U/L (ref 10–44)
ANION GAP SERPL CALC-SCNC: 11 MMOL/L (ref 8–16)
ANISOCYTOSIS BLD QL SMEAR: SLIGHT
AST SERPL-CCNC: 14 U/L (ref 10–40)
BASOPHILS # BLD AUTO: 0.03 K/UL (ref 0–0.2)
BASOPHILS NFR BLD: 0.1 % (ref 0–1.9)
BILIRUB SERPL-MCNC: 1.5 MG/DL (ref 0.1–1)
BUN SERPL-MCNC: 45 MG/DL (ref 8–23)
BURR CELLS BLD QL SMEAR: ABNORMAL
CALCIUM SERPL-MCNC: 8.3 MG/DL (ref 8.7–10.5)
CHLORIDE SERPL-SCNC: 109 MMOL/L (ref 95–110)
CO2 SERPL-SCNC: 25 MMOL/L (ref 23–29)
CREAT SERPL-MCNC: 1.1 MG/DL (ref 0.5–1.4)
DIFFERENTIAL METHOD: ABNORMAL
EOSINOPHIL # BLD AUTO: 0 K/UL (ref 0–0.5)
EOSINOPHIL NFR BLD: 0 % (ref 0–8)
ERYTHROCYTE [DISTWIDTH] IN BLOOD BY AUTOMATED COUNT: 16.5 % (ref 11.5–14.5)
EST. GFR  (AFRICAN AMERICAN): 53.3 ML/MIN/1.73 M^2
EST. GFR  (NON AFRICAN AMERICAN): 46.2 ML/MIN/1.73 M^2
GLUCOSE SERPL-MCNC: 152 MG/DL (ref 70–110)
HCT VFR BLD AUTO: 33.5 % (ref 37–48.5)
HGB BLD-MCNC: 10.2 G/DL (ref 12–16)
IMM GRANULOCYTES # BLD AUTO: 0.29 K/UL (ref 0–0.04)
IMM GRANULOCYTES NFR BLD AUTO: 1.2 % (ref 0–0.5)
LYMPHOCYTES # BLD AUTO: 0.8 K/UL (ref 1–4.8)
LYMPHOCYTES NFR BLD: 3.2 % (ref 18–48)
MAGNESIUM SERPL-MCNC: 2 MG/DL (ref 1.6–2.6)
MCH RBC QN AUTO: 28.3 PG (ref 27–31)
MCHC RBC AUTO-ENTMCNC: 30.4 G/DL (ref 32–36)
MCV RBC AUTO: 93 FL (ref 82–98)
MONOCYTES # BLD AUTO: 4 K/UL (ref 0.3–1)
MONOCYTES NFR BLD: 16.7 % (ref 4–15)
NEUTROPHILS # BLD AUTO: 18.6 K/UL (ref 1.8–7.7)
NEUTROPHILS NFR BLD: 78.8 % (ref 38–73)
NRBC BLD-RTO: 0 /100 WBC
PHOSPHATE SERPL-MCNC: 3.3 MG/DL (ref 2.7–4.5)
PLATELET # BLD AUTO: 77 K/UL (ref 150–450)
PMV BLD AUTO: 11.5 FL (ref 9.2–12.9)
POCT GLUCOSE: 171 MG/DL (ref 70–110)
POCT GLUCOSE: 205 MG/DL (ref 70–110)
POCT GLUCOSE: 236 MG/DL (ref 70–110)
POCT GLUCOSE: 261 MG/DL (ref 70–110)
POIKILOCYTOSIS BLD QL SMEAR: SLIGHT
POLYCHROMASIA BLD QL SMEAR: ABNORMAL
POTASSIUM SERPL-SCNC: 4.2 MMOL/L (ref 3.5–5.1)
PROCALCITONIN SERPL IA-MCNC: 0.67 NG/ML
PROT SERPL-MCNC: 5 G/DL (ref 6–8.4)
RBC # BLD AUTO: 3.61 M/UL (ref 4–5.4)
SCHISTOCYTES BLD QL SMEAR: ABNORMAL
SODIUM SERPL-SCNC: 145 MMOL/L (ref 136–145)
VANCOMYCIN SERPL-MCNC: 10.8 UG/ML
WBC # BLD AUTO: 23.66 K/UL (ref 3.9–12.7)

## 2021-12-22 PROCEDURE — 99291 CRITICAL CARE FIRST HOUR: CPT | Mod: ,,, | Performed by: PSYCHIATRY & NEUROLOGY

## 2021-12-22 PROCEDURE — 80202 ASSAY OF VANCOMYCIN: CPT | Performed by: PSYCHIATRY & NEUROLOGY

## 2021-12-22 PROCEDURE — 63600175 PHARM REV CODE 636 W HCPCS

## 2021-12-22 PROCEDURE — 99233 SBSQ HOSP IP/OBS HIGH 50: CPT | Mod: ,,, | Performed by: NEUROLOGICAL SURGERY

## 2021-12-22 PROCEDURE — 84100 ASSAY OF PHOSPHORUS: CPT

## 2021-12-22 PROCEDURE — 84145 PROCALCITONIN (PCT): CPT

## 2021-12-22 PROCEDURE — 63600175 PHARM REV CODE 636 W HCPCS: Performed by: NURSE PRACTITIONER

## 2021-12-22 PROCEDURE — 27200966 HC CLOSED SUCTION SYSTEM

## 2021-12-22 PROCEDURE — 25000003 PHARM REV CODE 250: Performed by: PSYCHIATRY & NEUROLOGY

## 2021-12-22 PROCEDURE — 80053 COMPREHEN METABOLIC PANEL: CPT

## 2021-12-22 PROCEDURE — 25000242 PHARM REV CODE 250 ALT 637 W/ HCPCS: Performed by: PSYCHIATRY & NEUROLOGY

## 2021-12-22 PROCEDURE — 99291 PR CRITICAL CARE, E/M 30-74 MINUTES: ICD-10-PCS | Mod: ,,, | Performed by: PSYCHIATRY & NEUROLOGY

## 2021-12-22 PROCEDURE — 85025 COMPLETE CBC W/AUTO DIFF WBC: CPT

## 2021-12-22 PROCEDURE — 99900035 HC TECH TIME PER 15 MIN (STAT)

## 2021-12-22 PROCEDURE — 99900026 HC AIRWAY MAINTENANCE (STAT)

## 2021-12-22 PROCEDURE — 94640 AIRWAY INHALATION TREATMENT: CPT

## 2021-12-22 PROCEDURE — 25000003 PHARM REV CODE 250: Performed by: NURSE PRACTITIONER

## 2021-12-22 PROCEDURE — 89220 SPUTUM SPECIMEN COLLECTION: CPT

## 2021-12-22 PROCEDURE — 94761 N-INVAS EAR/PLS OXIMETRY MLT: CPT

## 2021-12-22 PROCEDURE — A4216 STERILE WATER/SALINE, 10 ML: HCPCS | Performed by: PSYCHIATRY & NEUROLOGY

## 2021-12-22 PROCEDURE — 99233 PR SUBSEQUENT HOSPITAL CARE,LEVL III: ICD-10-PCS | Mod: ,,, | Performed by: NEUROLOGICAL SURGERY

## 2021-12-22 PROCEDURE — C9254 INJECTION, LACOSAMIDE: HCPCS | Performed by: NURSE PRACTITIONER

## 2021-12-22 PROCEDURE — 63600175 PHARM REV CODE 636 W HCPCS: Performed by: PSYCHIATRY & NEUROLOGY

## 2021-12-22 PROCEDURE — 25000003 PHARM REV CODE 250

## 2021-12-22 PROCEDURE — 20000000 HC ICU ROOM

## 2021-12-22 PROCEDURE — 94668 MNPJ CHEST WALL SBSQ: CPT

## 2021-12-22 PROCEDURE — 94003 VENT MGMT INPAT SUBQ DAY: CPT

## 2021-12-22 PROCEDURE — 27000221 HC OXYGEN, UP TO 24 HOURS

## 2021-12-22 PROCEDURE — 83735 ASSAY OF MAGNESIUM: CPT

## 2021-12-22 RX ORDER — INSULIN ASPART 100 [IU]/ML
1-10 INJECTION, SOLUTION INTRAVENOUS; SUBCUTANEOUS EVERY 4 HOURS PRN
Status: DISCONTINUED | OUTPATIENT
Start: 2021-12-22 | End: 2022-01-04 | Stop reason: HOSPADM

## 2021-12-22 RX ORDER — ACETYLCYSTEINE 100 MG/ML
4 SOLUTION ORAL; RESPIRATORY (INHALATION) 4 TIMES DAILY
Status: DISCONTINUED | OUTPATIENT
Start: 2021-12-22 | End: 2021-12-22

## 2021-12-22 RX ORDER — FENTANYL CITRATE 50 UG/ML
25 INJECTION, SOLUTION INTRAMUSCULAR; INTRAVENOUS EVERY 4 HOURS PRN
Status: DISCONTINUED | OUTPATIENT
Start: 2021-12-22 | End: 2021-12-24

## 2021-12-22 RX ORDER — ACETYLCYSTEINE 100 MG/ML
4 SOLUTION ORAL; RESPIRATORY (INHALATION)
Status: DISCONTINUED | OUTPATIENT
Start: 2021-12-22 | End: 2022-01-04 | Stop reason: HOSPADM

## 2021-12-22 RX ORDER — SODIUM CHLORIDE FOR INHALATION 3 %
4 VIAL, NEBULIZER (ML) INHALATION EVERY 4 HOURS
Status: DISCONTINUED | OUTPATIENT
Start: 2021-12-22 | End: 2022-01-02

## 2021-12-22 RX ORDER — VANCOMYCIN HCL IN 5 % DEXTROSE 1G/250ML
15 PLASTIC BAG, INJECTION (ML) INTRAVENOUS ONCE
Status: COMPLETED | OUTPATIENT
Start: 2021-12-22 | End: 2021-12-22

## 2021-12-22 RX ORDER — DEXMEDETOMIDINE HYDROCHLORIDE 4 UG/ML
0-1.4 INJECTION, SOLUTION INTRAVENOUS CONTINUOUS
Status: DISCONTINUED | OUTPATIENT
Start: 2021-12-22 | End: 2021-12-24

## 2021-12-22 RX ADMIN — CEFEPIME 1 G: 1 INJECTION, POWDER, FOR SOLUTION INTRAMUSCULAR; INTRAVENOUS at 09:12

## 2021-12-22 RX ADMIN — IPRATROPIUM BROMIDE AND ALBUTEROL SULFATE 3 ML: 2.5; .5 SOLUTION RESPIRATORY (INHALATION) at 07:12

## 2021-12-22 RX ADMIN — VANCOMYCIN HYDROCHLORIDE 1000 MG: 1 INJECTION, POWDER, LYOPHILIZED, FOR SOLUTION INTRAVENOUS at 09:12

## 2021-12-22 RX ADMIN — SODIUM CHLORIDE 100 MG: 9 INJECTION, SOLUTION INTRAVENOUS at 11:12

## 2021-12-22 RX ADMIN — INSULIN ASPART 4 UNITS: 100 INJECTION, SOLUTION INTRAVENOUS; SUBCUTANEOUS at 12:12

## 2021-12-22 RX ADMIN — INSULIN ASPART 4 UNITS: 100 INJECTION, SOLUTION INTRAVENOUS; SUBCUTANEOUS at 05:12

## 2021-12-22 RX ADMIN — Medication 10 ML: at 06:12

## 2021-12-22 RX ADMIN — Medication 10 ML: at 12:12

## 2021-12-22 RX ADMIN — SODIUM CHLORIDE 100 MG: 9 INJECTION, SOLUTION INTRAVENOUS at 09:12

## 2021-12-22 RX ADMIN — NOREPINEPHRINE BITARTRATE 0.02 MCG/KG/MIN: 4 INJECTION, SOLUTION INTRAVENOUS at 07:12

## 2021-12-22 RX ADMIN — SODIUM CHLORIDE 30 MG/ML INHALATION SOLUTION 4 ML: 30 SOLUTION INHALANT at 04:12

## 2021-12-22 RX ADMIN — HYDROXYCHLOROQUINE SULFATE 200 MG: 200 TABLET, FILM COATED ORAL at 09:12

## 2021-12-22 RX ADMIN — AMLODIPINE BESYLATE 10 MG: 10 TABLET ORAL at 09:12

## 2021-12-22 RX ADMIN — SODIUM CHLORIDE 30 MG/ML INHALATION SOLUTION 4 ML: 30 SOLUTION INHALANT at 11:12

## 2021-12-22 RX ADMIN — ACETYLCYSTEINE 4 ML: 100 INHALANT RESPIRATORY (INHALATION) at 04:12

## 2021-12-22 RX ADMIN — ACETYLCYSTEINE 4 ML: 100 INHALANT RESPIRATORY (INHALATION) at 08:12

## 2021-12-22 RX ADMIN — SENNOSIDES AND DOCUSATE SODIUM 1 TABLET: 50; 8.6 TABLET ORAL at 09:12

## 2021-12-22 RX ADMIN — ACETYLCYSTEINE 4 ML: 100 INHALANT RESPIRATORY (INHALATION) at 11:12

## 2021-12-22 RX ADMIN — CEFEPIME 1 G: 1 INJECTION, POWDER, FOR SOLUTION INTRAMUSCULAR; INTRAVENOUS at 05:12

## 2021-12-22 RX ADMIN — ATORVASTATIN CALCIUM 40 MG: 20 TABLET, FILM COATED ORAL at 09:12

## 2021-12-22 RX ADMIN — INSULIN ASPART 3 UNITS: 100 INJECTION, SOLUTION INTRAVENOUS; SUBCUTANEOUS at 08:12

## 2021-12-22 RX ADMIN — IPRATROPIUM BROMIDE AND ALBUTEROL SULFATE 3 ML: 2.5; .5 SOLUTION RESPIRATORY (INHALATION) at 04:12

## 2021-12-22 RX ADMIN — HEPARIN SODIUM 5000 UNITS: 5000 INJECTION INTRAVENOUS; SUBCUTANEOUS at 06:12

## 2021-12-22 RX ADMIN — HEPARIN SODIUM 5000 UNITS: 5000 INJECTION INTRAVENOUS; SUBCUTANEOUS at 02:12

## 2021-12-22 RX ADMIN — METHYLPREDNISOLONE SODIUM SUCCINATE 20 MG: 40 INJECTION, POWDER, FOR SOLUTION INTRAMUSCULAR; INTRAVENOUS at 09:12

## 2021-12-22 RX ADMIN — HEPARIN SODIUM 5000 UNITS: 5000 INJECTION INTRAVENOUS; SUBCUTANEOUS at 09:12

## 2021-12-22 RX ADMIN — IPRATROPIUM BROMIDE AND ALBUTEROL SULFATE 3 ML: 2.5; .5 SOLUTION RESPIRATORY (INHALATION) at 08:12

## 2021-12-22 RX ADMIN — IPRATROPIUM BROMIDE AND ALBUTEROL SULFATE 3 ML: 2.5; .5 SOLUTION RESPIRATORY (INHALATION) at 03:12

## 2021-12-22 RX ADMIN — MYCOPHENOLATE MOFETIL 500 MG: 200 POWDER, FOR SUSPENSION ORAL at 09:12

## 2021-12-22 RX ADMIN — METRONIDAZOLE 500 MG: 500 TABLET ORAL at 05:12

## 2021-12-22 RX ADMIN — DEXMEDETOMIDINE HYDROCHLORIDE 0.2 MCG/KG/HR: 4 INJECTION, SOLUTION INTRAVENOUS at 10:12

## 2021-12-22 RX ADMIN — IPRATROPIUM BROMIDE AND ALBUTEROL SULFATE 3 ML: 2.5; .5 SOLUTION RESPIRATORY (INHALATION) at 11:12

## 2021-12-22 RX ADMIN — MONTELUKAST 10 MG: 10 TABLET, FILM COATED ORAL at 09:12

## 2021-12-22 RX ADMIN — METRONIDAZOLE 500 MG: 500 TABLET ORAL at 09:12

## 2021-12-22 RX ADMIN — INSULIN ASPART 2 UNITS: 100 INJECTION, SOLUTION INTRAVENOUS; SUBCUTANEOUS at 06:12

## 2021-12-22 RX ADMIN — SODIUM CHLORIDE 30 MG/ML INHALATION SOLUTION 4 ML: 30 SOLUTION INHALANT at 08:12

## 2021-12-22 RX ADMIN — Medication 10 ML: at 05:12

## 2021-12-22 NOTE — PROCEDURES
[facility] EEG/VIDEO MONITORING REPORT  Selma Lux  [id number]  1935    DATE OF SERVICE:  12/20/2021-12/21/2021  DATE OF ADMISSION: 12/13/2021 10:38 AM    ADMITTING/REQUESTING PROVIDER: Selwyn Egan MD    REASON FOR CONSULT:  86-year-old woman with a large right subdural hematoma and episodes of left-sided twitching.  Evaluate for evidence of epileptiform activity.    METHODOLOGY   Electroencephalographic (EEG) recording is with electrodes placed according to the International 10-20 placement system.  Thirty two (32) channels of digital signal (sampling rate of 512/sec) including T1 and T2 was simultaneously recorded from the scalp and may include  EKG, EMG, and/or eye monitors.  Recording band pass was 0.1 to 512 hz.  Digital video recording of the patient is simultaneously recorded with the EEG.  The patient is instructed report clinical symptoms which may occur during the recording session.  EEG and video recording is stored and archived in digital format.  Activation procedures which include photic stimulation, hyperventilation and instructing patients to perform simple task are done in selected patients.   The EEG is displayed on a monitor screen and can be reviewed using different montages.  Computer assisted analysis is employed to detect spike and electrographic seizure activity.   The entire record is submitted for computer analysis.  The entire recording is visually reviewed and the times identified by computer analysis as being spikes or seizures are reviewed again.  Compresses spectral analysis (CSA) is also performed on the activity recorded from each individual channel.  This is displayed as a power display of frequencies from 0 to 30 Hz over time.   The CSA is reviewed looking for asymmetries in power between homologous areas of the scalp and then compared with the original EEG recording.     Mir[employer] software is also utilized in the review of this study.  This software suite analyzes the EEG  recording in multiple domains.  Coherence and rhythmicity is computed to identify EEG sections which may contain organized seizures.  Each channel undergoes analysis to detect presence of spike and sharp waves which have special and morphological characteristic of epileptic activity.  The routine EEG recording is converted from spacial into frequency domain.  This is then displayed comparing homologous areas to identify areas of significant asymmetry.  Algorithm to identify non-cortically generated artifact is used to separate eye movement, EMG and other artifact from the EEG.      RECORDING TIMES  Start on 12/20/2021 at 12:17 p.m.  Stop on 12/21/2021 at 13:53 p.m.-> End of the Recording Session  A total of 21 hours and 25 minutes of EEG recording is obtained.    EEG FINDINGS  Background activity:   The background is continuous and asymmetric.  On the left, there is mixed frequency theta/delta activity.  Over the right, there is significantly lower voltages with slightly slower frequencies.  There is plenty of intermittent generalized and multifocal slowing seen bilaterally.    There are no pushbutton activations.  However, on select portions of the video, the patient's left hand is noted to have brief myoclonic wrist flexing movements and sometimes she has a stereotyped shoulder shrug of the left shoulder.    Sleep:  There are periods with poorly formed sleep architecture    Activation procedures:   The patient is awake and able to follow simple commands but is unable to answer orientation questions because of intubation.    Cardiac Monitor:   Heart rate appears generally regular on a single lead EKG.    Impression:   This is an abnormal continuous EEG monitoring study because of myoclonic flexion movements of the left wrist and left shoulder shrug movements.  These movements do not have an EEG correlate and are not discrete electrographic seizures. However, epilepsia partialis continua (EPC), a focal motor  phenomenon that occurs when there is irritation over the contralateral motor strip, is EEG negative up to 70% of the time and can resulted in movements with this appearance. Clinical correlation is advised. Otherwise, there is generalized slowing consistent with a moderate encephalopathy with evidence of subcortical/deep midline dysfunction with more prominent focal slowing seen over the right.    Brianda Goss MD PhD  Neurology-Epilepsy  [facility].

## 2021-12-22 NOTE — PROGRESS NOTES
Pharmacokinetic Follow-Up Assessment: IV Vancomycin    Assessment/Plan:    - Vanc AM random is 10.8 mcg/mL (this was drawn approximately 21.5 hrs post loading dose)  - Dosing by level in the setting of low urine output and tenuous renal function  - Administer vanc 1000 mg x 1  - Draw random level tomorrow 12/23 with AM labs    Pharmacy will continue to follow and monitor vancomycin.    Please contact pharmacy at extension [phone] with any questions regarding this assessment.     Thank you for the consult,   Mary Olmos, PharmD, [facility]  Neurocritical Care Pharmacist  [phone]       Patient brief summary:  Selma Lux is a 86 y.o. female initiated on antimicrobial therapy with IV Vancomycin for treatment of suspected bacteremia    Drug Allergies:   Review of patient's allergies indicates:  No Known Allergies    Actual Body Weight:   67 kg    Renal Function:   Estimated Creatinine Clearance: 35.8 mL/min (based on SCr of 1.1 mg/dL).,     Dialysis Method (if applicable):  N/A    CBC (last 72 hours):  Recent Labs   Lab Result Units 12/20/21  1007 12/21/21  0248 12/22/21  0326   WBC K/uL 24.32* 27.96* 23.66*   Hemoglobin g/dL 10.5* 10.7* 10.2*   Hematocrit % 34.7* 36.8* 33.5*   Platelets K/uL 93* 88* 77*   Gran % % 82.4* 82.0* 78.8*   Lymph % % 2.5* 9.0* 3.2*   Mono % % 11.2 8.0 16.7*   Eosinophil % % 0.2 0.0 0.0   Basophil % % 0.1 0.0 0.1   Differential Method  Automated Manual Automated       Metabolic Panel (last 72 hours):  Recent Labs   Lab Result Units 12/20/21  1007 12/21/21  0248 12/21/21  1020 12/22/21  0326   Sodium mmol/L 146* 147*  --  145   Potassium mmol/L 4.0 4.2  --  4.2   Chloride mmol/L 110 109  --  109   CO2 mmol/L 31* 21*  --  25   Glucose mg/dL 197* 215*  --  152*   Glucose, UA   --   --  Negative  --    BUN mg/dL 36* 38*  --  45*   Creatinine mg/dL 0.8 1.0  --  1.1   Albumin g/dL 3.1* 3.1*  --  3.0*   Total Bilirubin mg/dL 1.6* 1.5*  --  1.5*   Alkaline Phosphatase U/L 54* 71  --  59   AST U/L 13 34   --  14   ALT U/L 23 36  --  32   Magnesium mg/dL 2.1 2.3  --  2.0   Phosphorus mg/dL 2.1* 5.7*  --  3.3       Drug levels (last 3 results):  Recent Labs   Lab Result Units 12/22/21  0326   Vancomycin, Random ug/mL 10.8       Microbiologic Results:  Microbiology Results (last 7 days)     Procedure Component Value Units Date/Time    Culture, Respiratory with Gram Stain [[id number]] Collected: 12/21/21 1015    Order Status: Completed Specimen: Respiratory from Tracheal Aspirate Updated: 12/21/21 2021     Gram Stain (Respiratory) <10 epithelial cells per low power field.     Gram Stain (Respiratory) Many WBC's     Gram Stain (Respiratory) Few Gram positive cocci    Blood culture [[id number]] Collected: 12/21/21 1010    Order Status: Completed Specimen: Blood from Peripheral, Forearm, Right Updated: 12/21/21 1745     Blood Culture, Routine No Growth to date    Narrative:      Blood cultures from 2 different sites. 4 bottles total.  Please draw before starting antibiotics.    Blood culture [[id number]] Collected: 12/21/21 1032    Order Status: Completed Specimen: Blood from Peripheral, Antecubital, Right Updated: 12/21/21 1745     Blood Culture, Routine No Growth to date    Narrative:      Blood cultures x 2 different sites. 4 bottles total. Please  draw cultures before administering antibiotics.

## 2021-12-22 NOTE — PLAN OF CARE
[facility] Care Plan    POC reviewed with Selma Lux and family at 1700. Pt family verbalized understanding. Questions and concerns addressed. No acute events overnight. Pt progressing toward goals. Will continue to monitor. See below and flowsheets for full assessment and VS info.       Neuro:  Marion Coma Scale  Best Eye Response: 2-->(E2) to pain  Best Motor Response: 5-->(M5) localizes pain  Best Verbal Response: 1-->(V1) none  Marion Coma Scale Score: 8  Assessment Qualifiers: patient chemically sedated or paralyzed  Pupil PERRLA: yes     24hr Temp:  [98 °F (36.7 °C)-98.3 °F (36.8 °C)]     CV:   Rhythm: normal sinus rhythm  BP goals:   SBP < 160  MAP > 65    Resp:   O2 Device (Oxygen Therapy): ventilator  Vent Mode: SIMV  Set Rate: 8 BPM  Oxygen Concentration (%): 50  Vt Set: 450 mL  PEEP/CPAP: 6 cmH20  Pressure Support: 10 cmH20    Plan: wean to extubate    GI/:  SCOOBY Total Score: 0  Diet/Nutrition Received: NPO  Last Bowel Movement: 12/21/21  Voiding Characteristics: incontinence    Intake/Output Summary (Last 24 hours) at 12/21/2021 1838  Last data filed at 12/21/2021 1800  Gross per 24 hour   Intake 1984.19 ml   Output 440 ml   Net 1544.19 ml     Unmeasured Output  Urine Occurrence: 1  Stool Occurrence: 1  Pad Count: 1    Labs/Accuchecks:  Recent Labs   Lab 12/17/21  0429 12/21/21  0248 12/21/21  0304   WBC  --  27.96*  --    RBC  --  3.78*  --    HGB  --  10.7*  --    HCT   < > 36.8* 33*   PLT  --  88*  --     < > = values in this interval not displayed.      Recent Labs   Lab 12/21/21  0248   *   K 4.2   CO2 21*      BUN 38*   CREATININE 1.0   ALKPHOS 71   ALT 36   AST 34   BILITOT 1.5*      Recent Labs   Lab 12/21/21  0255   INR 1.1   APTT 26.2    No results for input(s): CPK, CPKMB, TROPONINI, MB in the last 168 hours.    Electrolytes: N/A - electrolytes WDL  Accuchecks: Q4H    Gtts:   fentanyl Stopped (12/21/21 0969)    NORepinephrine bitartrate-D5W Stopped (12/21/21 3638)        LDA/Wounds:  Lines/Drains/Airways       Peripherally Inserted Central Catheter Line              PICC Double Lumen 12/15/21 1616 right basilic 6 days              Drain                   NG/OG Tube 12/17/21 1405 Left nostril 4 days         Urethral Catheter 12/21/21 0300 Temperature probe 16 Fr. <1 day              Airway                   Airway - Non-Surgical 12/21/21 0239 Endotracheal Tube <1 day       Airway Anesthesia 12/21/21 <1 day              Arterial Line              Arterial Line 12/21/21 0303 Left Brachial <1 day                  Wounds: no    Wound care consulted: No

## 2021-12-22 NOTE — PLAN OF CARE
[facility] Care Plan    POC reviewed with Selma Lux at 0300. Pt unable to verbalize understanding.  No acute events overnight. Pt progressing toward goals. Will continue to monitor. See below and flowsheets for full assessment and VS info.   -Fentanyl @ 25 mcg/hr continuous infusion started due to pt c/o pain/discomfort        Neuro:  Marion Coma Scale  Best Eye Response: 3-->(E3) to speech  Best Motor Response: 6-->(M6) obeys commands  Best Verbal Response: 1-->(V1) none  Marion Coma Scale Score: 10  Assessment Qualifiers: patient intubated  Pupil PERRLA: yes     24hr Temp:  [97.9 °F (36.6 °C)-98.5 °F (36.9 °C)]     CV:   Rhythm: atrial rhythm  BP goals:   SBP < 160  MAP > 65    Resp:   O2 Device (Oxygen Therapy): ventilator  Vent Mode: SIMV  Set Rate: 8 BPM  Oxygen Concentration (%): 50  Vt Set: 450 mL  PEEP/CPAP: 6 cmH20  Pressure Support: 10 cmH20    Plan: wean to extubate    GI/:  SCOOBY Total Score: 0  Diet/Nutrition Received: NPO  Last Bowel Movement: 12/21/21  Voiding Characteristics: urethral catheter (bladder)    Intake/Output Summary (Last 24 hours) at 12/22/2021 0728  Last data filed at 12/22/2021 0530  Gross per 24 hour   Intake 950.75 ml   Output 575 ml   Net 375.75 ml     Unmeasured Output  Urine Occurrence: 1  Stool Occurrence: 1  Pad Count: 1    Labs/Accuchecks:  Recent Labs   Lab 12/22/21  0326   WBC 23.66*   RBC 3.61*   HGB 10.2*   HCT 33.5*   PLT 77*      Recent Labs   Lab 12/22/21  0326      K 4.2   CO2 25      BUN 45*   CREATININE 1.1   ALKPHOS 59   ALT 32   AST 14   BILITOT 1.5*      Recent Labs   Lab 12/21/21  0255   INR 1.1   APTT 26.2    No results for input(s): CPK, CPKMB, TROPONINI, MB in the last 168 hours.    Electrolytes: N/A - electrolytes WDL  Accuchecks: Q4H    Gtts:   fentanyl Stopped (12/21/21 0979)    NORepinephrine bitartrate-D5W Stopped (12/21/21 0290)       LDA/Wounds:  Lines/Drains/Airways       Peripherally Inserted Central Catheter Line              PICC Double  Lumen 12/15/21 1616 right basilic 6 days              Drain                   NG/OG Tube 12/17/21 1405 Left nostril 4 days         Urethral Catheter 12/21/21 0300 Temperature probe 16 Fr. 1 day              Airway                   Airway - Non-Surgical 12/21/21 0239 Endotracheal Tube 1 day       Airway Anesthesia 12/21/21 1 day              Arterial Line              Arterial Line 12/21/21 0303 Left Brachial 1 day                  Wounds: No  Wound care consulted: No

## 2021-12-22 NOTE — SUBJECTIVE & OBJECTIVE
Interval History: Coded yesterday AM, intubated, no seizures overnight, now on fentanyl for agitation.     Medications:  Continuous Infusions:   fentanyl 25 mcg/hr (12/22/21 0700)    NORepinephrine bitartrate-D5W Stopped (12/21/21 0745)     Scheduled Meds:   albuterol-ipratropium  3 mL Nebulization Q4H    amLODIPine  10 mg Per NG tube Daily    atorvastatin  40 mg Per NG tube Daily    ceFEPime (MAXIPIME) IVPB  1 g Intravenous Q12H    heparin (porcine)  5,000 Units Subcutaneous Q8H    hydrOXYchloroQUINE  200 mg Per NG tube BID    insulin aspart U-100  2 Units Subcutaneous TIDWM    lacosamide (VIMPAT) IVPB  100 mg Intravenous Q12H    methylPREDNISolone sodium succinate injection  20 mg Intravenous Daily    metroNIDAZOLE  500 mg Per NG tube Q8H    montelukast  10 mg Per NG tube Daily    mycophenolate mofetil  500 mg Per NG tube BID    senna-docusate 8.6-50 mg  1 tablet Per NG tube Daily    sodium chloride 0.9%  250 mL Intravenous Once    sodium chloride 0.9%  10 mL Intravenous Q6H    vancomycin (VANCOCIN) IVPB  15 mg/kg (Dosing Weight) Intravenous Once     PRN Meds:acetaminophen, albuterol-ipratropium, calcium gluconate IVPB, calcium gluconate IVPB, calcium gluconate IVPB, dextrose 50%, glucagon (human recombinant), insulin aspart U-100, labetalol, magnesium sulfate IVPB, magnesium sulfate IVPB, ondansetron, potassium chloride in water **AND** potassium chloride in water **AND** potassium chloride in water, Flushing PICC Protocol **AND** sodium chloride 0.9% **AND** sodium chloride 0.9%, Pharmacy to dose Vancomycin consult **AND** vancomycin - pharmacy to dose     Review of Systems   Reason unable to perform ROS: intubated.     Objective:     Weight: 67 kg (147 lb 11.3 oz)  Body mass index is 25.35 kg/m².  Vital Signs (Most Recent):  Temp: 97.9 °F (36.6 °C) (12/22/21 0305)  Pulse: (!) 114 (12/22/21 0741)  Resp: (!) 32 (12/22/21 0741)  BP: (!) 148/72 (12/22/21 0700)  SpO2: 100 % (12/22/21 0741) Vital  Signs (24h Range):  Temp:  [97.9 °F (36.6 °C)-98.5 °F (36.9 °C)] 97.9 °F (36.6 °C)  Pulse:  [] 114  Resp:  [5-41] 32  SpO2:  [100 %] 100 %  BP: (116-148)/(57-72) 148/72  Arterial Line BP: (113-172)/(55-86) 148/72     Date 12/22/21 0700 - 12/23/21 0659   Shift 9161-9922 8413-6948 3089-7728 24 Hour Total   INTAKE   I.V.(mL/kg) 24.1(0.4)   24.1(0.4)   Shift Total(mL/kg) 24.1(0.4)   24.1(0.4)   OUTPUT   Urine(mL/kg/hr) 20   20   Shift Total(mL/kg) 20(0.3)   20(0.3)   Weight (kg) 67 67 67 67              Vent Mode: SIMV  Oxygen Concentration (%):  [50-70] 50  Resp Rate Total:  [12 br/min-50 br/min] 50 br/min  Vt Set:  [450 mL] 450 mL  PEEP/CPAP:  [6 cmH20] 6 cmH20  Pressure Support:  [10 cmH20] 10 cmH20  Mean Airway Pressure:  [9 cmH20-9.8 cmH20] 9.4 cmH20         NG/OG Tube 12/17/21 1405 Left nostril (Active)   Placement Check placement verified by aspirate characteristics 12/22/21 0305   Tolerance no signs/symptoms of discomfort 12/22/21 0305   Securement secured to nostril center 12/22/21 0305   Clamp Status/Tolerance no abdominal distention;no abdominal discomfort;no emesis;no nausea;no residual;no restlessness 12/22/21 0305   Suction Setting/Drainage Method suction at the bedside 12/22/21 0305   Insertion Site Appearance no redness, warmth, tenderness, skin breakdown, drainage 12/22/21 0305   Flush/Irrigation flushed w/;water;no resistance met 12/22/21 0305   Feeding Type continuous;by pump 12/22/21 0305   Feeding Action feeding continued 12/22/21 0305   Current Rate (mL/hr) 10 mL/hr 12/21/21 1905   Goal Rate (mL/hr) 10 mL/hr 12/21/21 1905   Intake (mL) 100 mL 12/20/21 0800   Water Bolus (mL) 200 mL 12/21/21 1600   Rate Formula Tube Feeding (mL/hr) 10 mL/hr 12/21/21 1905   Formula Name Isosource 12/22/21 0305   Intake (mL) - Formula Tube Feeding 20 12/21/21 1800   Residual Amount (ml) 0 ml 12/21/21 1905            Urethral Catheter 12/21/21 0300 Temperature probe 16 Fr. (Active)   Site Assessment Clean;Intact  "12/22/21 0305   Collection Container Urimeter 12/22/21 0305   Securement Method secured to top of thigh w/ adhesive device 12/22/21 0305   Catheter Care Performed yes 12/22/21 0305   Reason for Continuing Urinary Catheterization Critically ill in ICU and requiring hourly monitoring of intake/output 12/22/21 0305   CAUTI Prevention Bundle StatLock in place w 1" slack;Intact seal between catheter & drainage tubing;Drainage bag/urimeter off the floor;Sheeting clip in use;No dependent loops or kinks;Drainage bag/urimeter not overfilled (<2/3 full);Drainage bag/urimeter below bladder 12/21/21 1905   Output (mL) 20 mL 12/22/21 0700       Physical Exam:    Neurological:   [id number]  PERRL  EOMI  Face Symmetric  FCx4 R > L       Significant Labs:  Recent Labs   Lab 12/20/21  1007 12/21/21  0248 12/22/21  0326   * 215* 152*   * 147* 145   K 4.0 4.2 4.2    109 109   CO2 31* 21* 25   BUN 36* 38* 45*   CREATININE 0.8 1.0 1.1   CALCIUM 9.0 8.8 8.3*   MG 2.1 2.3 2.0     Recent Labs   Lab 12/20/21  1007 12/20/21  1007 12/21/21  0248 12/21/21  0304 12/22/21  0326   WBC 24.32*  --  27.96*  --  23.66*   HGB 10.5*  --  10.7*  --  10.2*   HCT 34.7*   < > 36.8* 33* 33.5*   PLT 93*  --  88*  --  77*    < > = values in this interval not displayed.     Recent Labs   Lab 12/21/21  0255   INR 1.1   APTT 26.2     Microbiology Results (last 7 days)     Procedure Component Value Units Date/Time    Culture, Respiratory with Gram Stain [[id number]] Collected: 12/21/21 1015    Order Status: Completed Specimen: Respiratory from Tracheal Aspirate Updated: 12/21/21 2021     Gram Stain (Respiratory) <10 epithelial cells per low power field.     Gram Stain (Respiratory) Many WBC's     Gram Stain (Respiratory) Few Gram positive cocci    Blood culture [[id number]] Collected: 12/21/21 1010    Order Status: Completed Specimen: Blood from Peripheral, Forearm, Right Updated: 12/21/21 7837     Blood Culture, Routine No Growth to date    " Narrative:      Blood cultures from 2 different sites. 4 bottles total.  Please draw before starting antibiotics.    Blood culture [[id number]] Collected: 12/21/21 1032    Order Status: Completed Specimen: Blood from Peripheral, Antecubital, Right Updated: 12/21/21 1745     Blood Culture, Routine No Growth to date    Narrative:      Blood cultures x 2 different sites. 4 bottles total. Please  draw cultures before administering antibiotics.        All pertinent labs from the last 24 hours have been reviewed.    Significant Diagnostics:  I have reviewed and interpreted all pertinent imaging results/findings within the past 24 hours.

## 2021-12-22 NOTE — PLAN OF CARE
[facility] Care Plan    POC reviewed with Selma Lux and family at 1600. Pt's family verbalized understanding. Questions and concerns addressed. No acute events today. Pt progressing toward goals. Will continue to monitor. See below and flowsheets for full assessment and VS info.   -CXR complete  -possible extubation tomorrow  -advanced tube feeds to 40g  -Fentanyl gtt stopped and precedex started  -Hill removed- bladder scan q6    Neuro:  Marion Coma Scale  Best Eye Response: 3-->(E3) to speech  Best Motor Response: 6-->(M6) obeys commands  Best Verbal Response: 1-->(V1) none  Marion Coma Scale Score: 10  Assessment Qualifiers: patient intubated,no eye obstruction present  Pupil PERRLA: yes     24 hr Temp:  [97.5 °F (36.4 °C)-98.5 °F (36.9 °C)]     CV:   Rhythm: atrial rhythm  BP goals:   SBP < 160  MAP > 65    Resp:   O2 Device (Oxygen Therapy): ventilator  Vent Mode: SIMV  Set Rate: 8 BPM  Oxygen Concentration (%): 50  Vt Set: 450 mL  PEEP/CPAP: 6 cmH20  Pressure Support: 10 cmH20    Plan: wean to extubate    GI/:  SCOOBY Total Score: 0  Diet/Nutrition Received: tube feeding  Last Bowel Movement: 12/21/21  Voiding Characteristics: urethral catheter (bladder)    Intake/Output Summary (Last 24 hours) at 12/22/2021 1619  Last data filed at 12/22/2021 1600  Gross per 24 hour   Intake 929.49 ml   Output 320 ml   Net 609.49 ml     Unmeasured Output  Urine Occurrence: 1  Stool Occurrence: 1  Pad Count: 1    Labs/Accuchecks:  Recent Labs   Lab 12/22/21  0326   WBC 23.66*   RBC 3.61*   HGB 10.2*   HCT 33.5*   PLT 77*      Recent Labs   Lab 12/22/21  0326      K 4.2   CO2 25      BUN 45*   CREATININE 1.1   ALKPHOS 59   ALT 32   AST 14   BILITOT 1.5*      Recent Labs   Lab 12/21/21  0255   INR 1.1   APTT 26.2    No results for input(s): CPK, CPKMB, TROPONINI, MB in the last 168 hours.    Electrolytes: N/A - electrolytes WDL  Accuchecks: Q4H    Gtts:   dexmedetomidine (PRECEDEX) infusion 0.2 mcg/kg/hr (12/22/21  1600)    NORepinephrine bitartrate-D5W Stopped (12/21/21 0745)       LDA/Wounds:  Lines/Drains/Airways       Peripherally Inserted Central Catheter Line              PICC Double Lumen 12/15/21 1616 right basilic 7 days              Drain                   NG/OG Tube 12/17/21 1405 Left nostril 5 days              Airway                   Airway - Non-Surgical 12/21/21 0239 Endotracheal Tube 1 day       Airway Anesthesia 12/21/21 1 day              Arterial Line              Arterial Line 12/21/21 0303 Left Brachial 1 day                  Wounds: No  Wound care consulted: No

## 2021-12-22 NOTE — ASSESSMENT & PLAN NOTE
86F with on eliquis s/p head trauma 1 week prior now with small aSDH and AMS c/b PEA arrest 12/20:    --admitted to [facility]  --CTH: R temp/paretial aSDH 5-6mm thickness, 3-5mm MLS, stable on repeat as well as imaging post PEA arrest  --holding eliquis  --f/up cEEG, no seizrues overnight 12/22  --AED per Epilepsy  --SBP<150, HOB > 30  --Na >135,   --ok for SQH  --no acute neurosurgical intervention at this time, PEA arrest w/up per NCC  --Please page NSGY immediately for any changes in neuro status    Please contact the on call neurosurgery provider with questions or concerns. Provider may be reached via  or during weekday call only via [employer] [phone].     D/w Dr Justin

## 2021-12-22 NOTE — PROGRESS NOTES
[facility]  Adult Nutrition  Progress Note    SUMMARY       Recommendations    1. As medically able, ADAT to cardiac with texture per SLP.   - If po intake <50%, recommend adding Boost Plus BID.     2. If unable to extubate, recommend initiating TF of Peptamen Intense VHP advancing as tolerated to goal rate of 45 mL/hr to provide 1080 kcal, 99 gm protein, and 907 mL free fluid.      3. RD to monitor    Goals: Pt will receive nutrition by RD f/u  Nutrition Goal Status: goal met  Communication of RD Recs:  (POC)    Assessment and Plan    Nutrition Problem  Inadequate energy intake     Related to (etiology):   Inability to consume sufficient energy     Signs and Symptoms (as evidenced by):   NPO with no alternate means of nutrition     Interventions (treatment strategy):  Collaboration with other providers  Enteral nutrition     Nutrition Diagnosis Status:   Continues      Malnutrition Assessment                 Orbital Region (Subcutaneous Fat Loss): well nourished  Upper Arm Region (Subcutaneous Fat Loss): mild depletion   Congregational Region (Muscle Loss): well nourished  Clavicle Bone Region (Muscle Loss): well nourished  Clavicle and Acromion Bone Region (Muscle Loss): well nourished  Scapular Bone Region (Muscle Loss): well nourished  Dorsal Hand (Muscle Loss): well nourished   Edema (Fluid Accumulation): 2-->mild   Subcutaneous Fat Loss (Final Summary): well nourished  Muscle Loss Evaluation (Final Summary): well nourished         Reason for Assessment    Reason For Assessment: RD follow-up  Diagnosis:  (SDH)  Relevant Medical History: TIA, RA  Interdisciplinary Rounds: did not attend  General Information Comments: Pt coded yesterday morning, now intubated with no family at bedside. TF at trickle feeds, tolerating well. NFPE completed 12/17; pt with some mild age-appropriate wasting. No other s/s of malnutrition. Pt at risk for acute malnutrition. Will continue to monitor energy intake and wt  "changes.   Nutrition Discharge Planning: pending medical course    Nutrition Risk Screen    Nutrition Risk Screen: tube feeding or parenteral nutrition    Nutrition/Diet History    Patient Reported Diet/Restrictions/Preferences: general  Spiritual, Cultural Beliefs, Sikhism Practices, Values that Affect Care: no  Food Allergies: NKFA  Factors Affecting Nutritional Intake: NPO,on mechanical ventilation    Anthropometrics    Temp: 97.5 °F (36.4 °C)  Height Method: Estimated  Height: 5' 4" (162.6 cm)  Height (inches): 64 in  Weight Method: Bed Scale  Weight: 67 kg (147 lb 11.3 oz)  Weight (lb): 147.71 lb  Ideal Body Weight (IBW), Female: 120 lb  % Ideal Body Weight, Female (lb): 123.09 %  BMI (Calculated): 25.3  BMI Grade: 25 - 29.9 - overweight     Lab/Procedures/Meds    Pertinent Labs Reviewed: reviewed  Pertinent Labs Comments: BUN 25, GFR 46.2, glucose 152, T bili 1.5  Pertinent Medications Reviewed: reviewed  Pertinent Medications Comments: amlodipine, statin, montelukast, senna-docusate    Estimated/Assessed Needs    Weight Used For Calorie Calculations: 67 kg (147 lb 11.3 oz)  Energy Calorie Requirements (kcal): 1231 kcal/day  Energy Need Method: [facility]  Protein Requirements:  gm (1.2-1.5 gm/kg)  Weight Used For Protein Calculations: 67 kg (147 lb 11.3 oz)  Fluid Requirements (mL): 1 mL/kcal or per MD  Estimated Fluid Requirement Method: RDA Method  RDA Method (mL): 1231     Nutrition Prescription Ordered    Current Diet Order: NPO  Current Nutrition Support Formula Ordered: Isosource 1.5  Current Nutrition Support Rate Ordered: 10 (ml)  Current Nutrition Support Frequency Ordered: mL/hr    Evaluation of Received Nutrient/Fluid Intake    I/O: -1.218L since admit   Energy Calories Required: not meeting needs  Protein Required: not meeting needs  Comments: LBM 12/21  Tolerance: tolerating  % Intake of Estimated Energy Needs: 0 - 25 %  % Meal Intake: NPO    Nutrition Risk    Level of Risk/Frequency of " Follow-up: low     Monitor and Evaluation    Food and Nutrient Intake: energy intake,enteral nutrition intake  Food and Nutrient Adminstration: enteral and parenteral nutrition administration  Knowledge/Beliefs/Attitudes: food and nutrition knowledge/skill  Physical Activity and Function: nutrition-related ADLs and IADLs  Anthropometric Measurements: weight,weight change,body mass index  Biochemical Data, Medical Tests and Procedures: gastrointestinal profile,electrolyte and renal panel,glucose/endocrine profile,inflammatory profile,lipid profile  Nutrition-Focused Physical Findings: overall appearance,extremities, muscles and bones     Nutrition Follow-Up    RD Follow-up?: Yes

## 2021-12-22 NOTE — PROGRESS NOTES
[facility] Neuro Critical [facility]  Neurocritical Care  Progress Note    Admit Date: 12/13/2021  Service Date: 12/22/2021  Length of Stay: 9    Subjective:     Chief Complaint: SDH (subdural hematoma)    History of Present Illness:   The patient is an 86-year-old female with past medical history of TIA, RA and Afib of Eliquis admitted to [facility] with Right SDH.  She was found on the floor this morning only responding to painful stimuli.  Patient occasionally says her name.GCS 12 noted for EMS.  PCC administered. She had a mechanical fall out of bed on Thursday hitting her head and has been complaining of intermittent headaches since. Her last dose of eliquis was on 12/11. Pending repeat CT head. NSGY consulted. In addition patient was found to be hyponatremic at 118-central line placed and hypertonic saline infusion initiated. Pending repeat CT head. Patient admitted to [facility] for close monitoring and higher level of care.       Hospital Course: 12/13/2021: patient admitted to [facility] s/p fall on 12/11 on Eliquis  12/14/2021 wheezing, prolonged expiratory phase.   12/15/2021 copious respiratory secretions. Remains encephalopathic. Review EEG  12/16/2021 improved respiratory secretions. D/c nasal tracheal airway if minimal secretions.   12/17/2021 restless.   12/18/2021 lethargic, rising BUN/Cr ratio. Fluid resuscitation. Received flumazenil for BDZ   12/19/2021 encephalopathy overnight. Work up neg so far. BUN/Cr >20 from volume contraction.   12/20/2021 left facial twitching overnight. Loaded with vimpat.   12/21/2021 event overnight cardiac arrest/PEA likely related to respiratory distress. Intubated.   12/22/2021 Add precedex to enable vent weaning.      Review of Symptoms: encephalopathic cannot participate    Medications:  Continuous Infusions:   dexmedetomidine (PRECEDEX) infusion      fentanyl 25 mcg/hr (12/22/21 0800)    NORepinephrine bitartrate-D5W Stopped (12/21/21 0745)     Scheduled Meds:   albuterol-ipratropium   3 mL Nebulization Q4H    amLODIPine  10 mg Per NG tube Daily    atorvastatin  40 mg Per NG tube Daily    ceFEPime (MAXIPIME) IVPB  1 g Intravenous Q12H    heparin (porcine)  5,000 Units Subcutaneous Q8H    hydrOXYchloroQUINE  200 mg Per NG tube BID    insulin aspart U-100  2 Units Subcutaneous TIDWM    lacosamide (VIMPAT) IVPB  100 mg Intravenous Q12H    methylPREDNISolone sodium succinate injection  20 mg Intravenous Daily    metroNIDAZOLE  500 mg Per NG tube Q8H    montelukast  10 mg Per NG tube Daily    mycophenolate mofetil  500 mg Per NG tube BID    senna-docusate 8.6-50 mg  1 tablet Per NG tube Daily    sodium chloride 0.9%  250 mL Intravenous Once    sodium chloride 0.9%  10 mL Intravenous Q6H    vancomycin (VANCOCIN) IVPB  15 mg/kg (Dosing Weight) Intravenous Once     PRN Meds:.acetaminophen, albuterol-ipratropium, calcium gluconate IVPB, calcium gluconate IVPB, calcium gluconate IVPB, dextrose 50%, glucagon (human recombinant), insulin aspart U-100, labetalol, magnesium sulfate IVPB, magnesium sulfate IVPB, ondansetron, potassium chloride in water **AND** potassium chloride in water **AND** potassium chloride in water, Flushing PICC Protocol **AND** sodium chloride 0.9% **AND** sodium chloride 0.9%, Pharmacy to dose Vancomycin consult **AND** vancomycin - pharmacy to dose    OBJECTIVE:   Vital Signs (Most Recent):   Temp: 97.5 °F (36.4 °C) (12/22/21 0700)  Pulse: 109 (12/22/21 0800)  Resp: 13 (12/22/21 0800)  BP: (!) 148/72 (12/22/21 0700)  SpO2: 100 % (12/22/21 0800)    Vital Signs (24h Range):   Temp:  [97.5 °F (36.4 °C)-98.5 °F (36.9 °C)] 97.5 °F (36.4 °C)  Pulse:  [] 109  Resp:  [5-41] 13  SpO2:  [100 %] 100 %  BP: (116-148)/(57-72) 148/72  Arterial Line BP: (113-172)/(55-86) 139/71    ICP/CPP (Last 24h):        I & O (Last 24h):     Intake/Output Summary (Last 24 hours) at 12/22/2021 0901  Last data filed at 12/22/2021 0800  Gross per 24 hour   Intake 752.46 ml   Output 510 ml    Net 242.46 ml     Physical Exam: unchanged from previous day  GA: awake, comfortable, no acute distress.   HEENT: No scleral icterus or JVD. Neck supple  Pulmonary: Air entry equal to auscultation A/P/L. Wheezing +, crackles +  Cardiac: RRR, S1 & S2 w/o rubs/murmurs/gallops.   Abdominal: soft, non-tender, bowel sounds present x 4. No appreciable hepatosplenomegaly.  Skin: No jaundice, rashes, or visible lesions.  Neuro:  --Mental Status:  Sedated but easily arousable, follows one step commands. Able to show two fingers Spontaneous eye opening.   --Pupils 3.5 mm, PERRL.   --Corneal reflex not done in this arousable patient, gag, cough intact.  Decreased movement (improved) on the left UE and LE  MSK:  No edema in UE and LE    Vent Data:   Vent Mode: SIMV  Oxygen Concentration (%):  [50] 50  Resp Rate Total:  [12 br/min-50 br/min] 14 br/min  Vt Set:  [450 mL] 450 mL  PEEP/CPAP:  [6 cmH20] 6 cmH20  Pressure Support:  [10 cmH20] 10 cmH20  Mean Airway Pressure:  [9 cmH20-9.8 cmH20] 9.4 cmH20    Lines/Drains/Airway:        Airway - Non-Surgical 12/21/21 0239 Endotracheal Tube (Active)   Secured at 24 cm 12/22/21 0741   Measured At Lips 12/22/21 0741   Secured Location Center 12/22/21 0741   Secured by Commercial tube fajardo 12/22/21 0741   Bite Block center 12/22/21 0741   Site Condition Cool;Dry 12/22/21 0741   Status Intact;Secured;Patent 12/22/21 0741   Site Assessment Clean;Dry 12/22/21 0741   Cuff Pressure 32 cm H2O 12/22/21 0741          Airway Anesthesia 12/21/21 (Active)      PICC Double Lumen 12/15/21 1616 right basilic (Active)   Verification by X-ray Yes 12/22/21 0700   Site Assessment No drainage;No redness;No swelling;No warmth 12/22/21 0700   Extremity Assessment Distal to IV No abnormal discoloration;No redness;No warmth;No swelling 12/22/21 0700   Line Securement Device Secured with sutureless device 12/22/21 0700   Dressing Type Biopatch in place;Central line dressing 12/22/21 0700   Dressing Status  Clean;Dry;Intact 12/22/21 0700   Dressing Intervention Integrity maintained 12/22/21 0700   Date on Dressing 12/15/21 12/22/21 0700   Dressing Due to be Changed 12/22/21 12/22/21 0700   Left Lumen Patency/Care Infusing 12/22/21 0700   Right Lumen Patency/Care Infusing 12/22/21 0700   Current Insertion Depth (cm) 33 cm 12/15/21 1616   Current Exposed Catheter (cm) 0 cm 12/15/21 1616   Extremity Circumference (cm) 33 cm 12/15/21 1616   Waveform Normal 12/17/21 1501   Line Necessity Review Poor venous access 12/21/21 1905      Arterial Line 12/21/21 0303 Left Brachial (Active)   Site Assessment Clean;Dry;Intact;No redness;No swelling 12/22/21 0700   Line Status Pulsatile blood flow 12/22/21 0700   Art Line Waveform Appropriate;Square wave test performed 12/22/21 0700   Arterial Line Interventions Zeroed and calibrated;Leveled 12/22/21 0700   Color/Movement/Sensation Capillary refill less than 3 sec 12/22/21 0700   Dressing Type Biopatch in place;Central line dressing 12/22/21 0700   Dressing Status Clean;Dry;Intact 12/22/21 0700   Dressing Intervention Integrity maintained 12/22/21 0700   Dressing Change Due 12/28/21 12/22/21 0700   Tubing Change Due 12/28/21 12/22/21 0700           NG/OG Tube 12/17/21 1405 Left nostril (Active)   Placement Check placement verified by aspirate characteristics;placement verified by x-ray 12/22/21 0700   Tolerance no signs/symptoms of discomfort 12/22/21 0700   Securement secured to nostril center 12/22/21 0700   Clamp Status/Tolerance unclamped 12/22/21 0700   Suction Setting/Drainage Method suction at the bedside 12/22/21 0700   Insertion Site Appearance no redness, warmth, tenderness, skin breakdown, drainage 12/22/21 0700   Flush/Irrigation flushed w/;water;no resistance met 12/22/21 0700   Feeding Type continuous;bolus 12/22/21 0700   Feeding Action feeding continued 12/22/21 0700   Current Rate (mL/hr) 10 mL/hr 12/22/21 0700   Goal Rate (mL/hr) 10 mL/hr 12/22/21 0700   Intake (mL)  "100 mL 12/20/21 0800   Water Bolus (mL) 200 mL 12/21/21 1600   Rate Formula Tube Feeding (mL/hr) 10 mL/hr 12/22/21 0700   Formula Name Isosource 12/22/21 0700   Intake (mL) - Formula Tube Feeding 10 12/22/21 0800   Residual Amount (ml) 0 ml 12/21/21 1905            Urethral Catheter 12/21/21 0300 Temperature probe 16 Fr. (Active)   Site Assessment Clean;Intact 12/22/21 0700   Collection Container Urimeter 12/22/21 0700   Securement Method secured to top of thigh w/ adhesive device 12/22/21 0700   Catheter Care Performed yes 12/22/21 0700   Reason for Continuing Urinary Catheterization Critically ill in ICU and requiring hourly monitoring of intake/output 12/22/21 0700   CAUTI Prevention Bundle StatLock in place w 1" slack;Intact seal between catheter & drainage tubing;Drainage bag/urimeter off the floor;Sheeting clip in use;No dependent loops or kinks;Drainage bag/urimeter not overfilled (<2/3 full);Drainage bag/urimeter below bladder 12/22/21 0700   Output (mL) 20 mL 12/22/21 0700       Nutrition/Tube Feeds (if NPO state why): tf    Labs:  ABG:   No results for input(s): PH, PO2, PCO2, HCO3, POCSATURATED, BE in the last 24 hours.  BMP:  Recent Labs   Lab 12/22/21  0326      K 4.2      CO2 25   BUN 45*   CREATININE 1.1   *   MG 2.0   PHOS 3.3     LFT:   Lab Results   Component Value Date    AST 14 12/22/2021    ALT 32 12/22/2021    ALKPHOS 59 12/22/2021    BILITOT 1.5 (H) 12/22/2021    ALBUMIN 3.0 (L) 12/22/2021    PROT 5.0 (L) 12/22/2021     CBC:   Lab Results   Component Value Date    WBC 23.66 (H) 12/22/2021    HGB 10.2 (L) 12/22/2021    HCT 33.5 (L) 12/22/2021    MCV 93 12/22/2021    PLT 77 (L) 12/22/2021     Microbiology x 7d:   Microbiology Results (last 7 days)     Procedure Component Value Units Date/Time    Culture, Respiratory with Gram Stain [[id number]] Collected: 12/21/21 1015    Order Status: Completed Specimen: Respiratory from Tracheal Aspirate Updated: 12/21/21 2021     Gram Stain " (Respiratory) <10 epithelial cells per low power field.     Gram Stain (Respiratory) Many WBC's     Gram Stain (Respiratory) Few Gram positive cocci    Blood culture [[id number]] Collected: 12/21/21 1010    Order Status: Completed Specimen: Blood from Peripheral, Forearm, Right Updated: 12/21/21 1745     Blood Culture, Routine No Growth to date    Narrative:      Blood cultures from 2 different sites. 4 bottles total.  Please draw before starting antibiotics.    Blood culture [[id number]] Collected: 12/21/21 1032    Order Status: Completed Specimen: Blood from Peripheral, Antecubital, Right Updated: 12/21/21 1745     Blood Culture, Routine No Growth to date    Narrative:      Blood cultures x 2 different sites. 4 bottles total. Please  draw cultures before administering antibiotics.        Imaging:  CXR 12/21 - improved interstitial edema. Lines in place. Widened mediastinum (POA)  I personally reviewed the above image.  Today I independently reviewed pertinent medications, lines/drains/airways, imaging, cardiology results, laboratory results, microbiology results, notably:   ASSESSMENT/PLAN:     Active Hospital Problems    Diagnosis    *SDH (subdural hematoma)    Cardiac arrest    Encephalopathy    Twitching    Stroke    Hyponatremia    Age-related physical debility    Leukocytosis    RA (rheumatoid arthritis)      Neuro:   SDH with brain compression  Acute encephalopathy likely multifactorial  Repeat CT head 12/18 - crowding and mass effect of right hemisphere from overlying SDH.   MRI brain 12/19 - demonstrated SDH with mass effect. No large territory ischemia to explain neurological decline  Received flumazenil 12/18 for ativan reversal.   Loaded with vimpat for left facial twitching on 12/19.   Review cEEG with epilepsy team  Precedex for vent comfort with fentanyl PRN    Pulmonary:   Solumedrol (conversion from home regimen of prednisone)  Duonebs/3% nacl/mucomyst/Chest PT  CXR/ABG daily      Cardiac:    SBP goal <160mmhg  EF 65%  Post cardiac arrest/PEA likely respiratory due to secretions - awake, tracks visually    Renal:    BUN/Cr >20 improving following fluid resuscitation  FWF 200cc q4hrly    ID:   Afebrile, leucocytosis trending down  Panculture-NGTD, broad spectrum antibiotics  Check procalcitonin -0.59>>0.67  Will monitor    Hem/Onc:   Transfuse for Hb <7 or 8 if associated with hypotension  plt count slight down trend but stable    Endocrine:    Hyperglycemia - start sliding scale if BS>200 schedule insulin aspart 2units q4hrly  HbA1c 5.3%     Fluids/Electrolytes/Nutrition/GI:   Replete lytes as needed  Tf - advance to goal  Lines:  Art +  CVC +  ETT NA  Hill d/c. Bladder scan twice a shift  NG +  PEG NA    Proph:  DVT:SCD/heparin  Constipation:  Last output:bowel regimen as needed  PUP: NA  VAP: NA    Family updated at bedside    Uninterrupted Critical Care/Counseling Time (not including procedures):: 37 mins    Juan Irwin MD, FNCS  Neurocritical care attending    Full Code    Juan Irwin MD  Neurocritical Care  [facility] - Neuro Critical Care

## 2021-12-22 NOTE — PROGRESS NOTES
[facility] Neuro [facility]  Neurosurgery  Progress Note    Subjective:     History of Present Illness: 86-year-old female with past medical history of TIA comes to the emergency department for AMS.  She was found on the floor about 1 hour ago only responding to painful stimuli.  Patient occasionally says her name.GCS 12 noted for EMS.  Patient does take blood thinners. She had a mechanical fall out of bed on Thursday hitting her head and has been complaining of intermittent headaches since. Her last dose of eliquis was on 12/11. NSGY consulted for CTH with SDH      Post-Op Info:  * No surgery found *         Interval History: Coded yesterday AM, intubated, no seizures overnight, now on fentanyl for agitation.     Medications:  Continuous Infusions:   fentanyl 25 mcg/hr (12/22/21 0700)    NORepinephrine bitartrate-D5W Stopped (12/21/21 0745)     Scheduled Meds:   albuterol-ipratropium  3 mL Nebulization Q4H    amLODIPine  10 mg Per NG tube Daily    atorvastatin  40 mg Per NG tube Daily    ceFEPime (MAXIPIME) IVPB  1 g Intravenous Q12H    heparin (porcine)  5,000 Units Subcutaneous Q8H    hydrOXYchloroQUINE  200 mg Per NG tube BID    insulin aspart U-100  2 Units Subcutaneous TIDWM    lacosamide (VIMPAT) IVPB  100 mg Intravenous Q12H    methylPREDNISolone sodium succinate injection  20 mg Intravenous Daily    metroNIDAZOLE  500 mg Per NG tube Q8H    montelukast  10 mg Per NG tube Daily    mycophenolate mofetil  500 mg Per NG tube BID    senna-docusate 8.6-50 mg  1 tablet Per NG tube Daily    sodium chloride 0.9%  250 mL Intravenous Once    sodium chloride 0.9%  10 mL Intravenous Q6H    vancomycin (VANCOCIN) IVPB  15 mg/kg (Dosing Weight) Intravenous Once     PRN Meds:acetaminophen, albuterol-ipratropium, calcium gluconate IVPB, calcium gluconate IVPB, calcium gluconate IVPB, dextrose 50%, glucagon (human recombinant), insulin aspart U-100, labetalol, magnesium sulfate IVPB, magnesium sulfate  IVPB, ondansetron, potassium chloride in water **AND** potassium chloride in water **AND** potassium chloride in water, Flushing PICC Protocol **AND** sodium chloride 0.9% **AND** sodium chloride 0.9%, Pharmacy to dose Vancomycin consult **AND** vancomycin - pharmacy to dose     Review of Systems   Reason unable to perform ROS: intubated.     Objective:     Weight: 67 kg (147 lb 11.3 oz)  Body mass index is 25.35 kg/m².  Vital Signs (Most Recent):  Temp: 97.9 °F (36.6 °C) (12/22/21 0305)  Pulse: (!) 114 (12/22/21 0741)  Resp: (!) 32 (12/22/21 0741)  BP: (!) 148/72 (12/22/21 0700)  SpO2: 100 % (12/22/21 0741) Vital Signs (24h Range):  Temp:  [97.9 °F (36.6 °C)-98.5 °F (36.9 °C)] 97.9 °F (36.6 °C)  Pulse:  [] 114  Resp:  [5-41] 32  SpO2:  [100 %] 100 %  BP: (116-148)/(57-72) 148/72  Arterial Line BP: (113-172)/(55-86) 148/72     Date 12/22/21 0700 - 12/23/21 0659   Shift 8613-0075 5234-1726 3156-7362 24 Hour Total   INTAKE   I.V.(mL/kg) 24.1(0.4)   24.1(0.4)   Shift Total(mL/kg) 24.1(0.4)   24.1(0.4)   OUTPUT   Urine(mL/kg/hr) 20   20   Shift Total(mL/kg) 20(0.3)   20(0.3)   Weight (kg) 67 67 67 67              Vent Mode: SIMV  Oxygen Concentration (%):  [50-70] 50  Resp Rate Total:  [12 br/min-50 br/min] 50 br/min  Vt Set:  [450 mL] 450 mL  PEEP/CPAP:  [6 cmH20] 6 cmH20  Pressure Support:  [10 cmH20] 10 cmH20  Mean Airway Pressure:  [9 cmH20-9.8 cmH20] 9.4 cmH20         NG/OG Tube 12/17/21 1405 Left nostril (Active)   Placement Check placement verified by aspirate characteristics 12/22/21 0305   Tolerance no signs/symptoms of discomfort 12/22/21 0305   Securement secured to nostril center 12/22/21 0305   Clamp Status/Tolerance no abdominal distention;no abdominal discomfort;no emesis;no nausea;no residual;no restlessness 12/22/21 0305   Suction Setting/Drainage Method suction at the bedside 12/22/21 0305   Insertion Site Appearance no redness, warmth, tenderness, skin breakdown, drainage 12/22/21 0305  "  Flush/Irrigation flushed w/;water;no resistance met 12/22/21 0305   Feeding Type continuous;by pump 12/22/21 0305   Feeding Action feeding continued 12/22/21 0305   Current Rate (mL/hr) 10 mL/hr 12/21/21 1905   Goal Rate (mL/hr) 10 mL/hr 12/21/21 1905   Intake (mL) 100 mL 12/20/21 0800   Water Bolus (mL) 200 mL 12/21/21 1600   Rate Formula Tube Feeding (mL/hr) 10 mL/hr 12/21/21 1905   Formula Name Isosource 12/22/21 0305   Intake (mL) - Formula Tube Feeding 20 12/21/21 1800   Residual Amount (ml) 0 ml 12/21/21 1905            Urethral Catheter 12/21/21 0300 Temperature probe 16 Fr. (Active)   Site Assessment Clean;Intact 12/22/21 0305   Collection Container Urimeter 12/22/21 0305   Securement Method secured to top of thigh w/ adhesive device 12/22/21 0305   Catheter Care Performed yes 12/22/21 0305   Reason for Continuing Urinary Catheterization Critically ill in ICU and requiring hourly monitoring of intake/output 12/22/21 0305   CAUTI Prevention Bundle StatLock in place w 1" slack;Intact seal between catheter & drainage tubing;Drainage bag/urimeter off the floor;Sheeting clip in use;No dependent loops or kinks;Drainage bag/urimeter not overfilled (<2/3 full);Drainage bag/urimeter below bladder 12/21/21 1905   Output (mL) 20 mL 12/22/21 0700       Physical Exam:    Neurological:   [id number]  PERRL  EOMI  Face Symmetric  FCx4 R > L       Significant Labs:  Recent Labs   Lab 12/20/21  1007 12/21/21  0248 12/22/21  0326   * 215* 152*   * 147* 145   K 4.0 4.2 4.2    109 109   CO2 31* 21* 25   BUN 36* 38* 45*   CREATININE 0.8 1.0 1.1   CALCIUM 9.0 8.8 8.3*   MG 2.1 2.3 2.0     Recent Labs   Lab 12/20/21  1007 12/20/21  1007 12/21/21  0248 12/21/21  0304 12/22/21  0326   WBC 24.32*  --  27.96*  --  23.66*   HGB 10.5*  --  10.7*  --  10.2*   HCT 34.7*   < > 36.8* 33* 33.5*   PLT 93*  --  88*  --  77*    < > = values in this interval not displayed.     Recent Labs   Lab 12/21/21  0255   INR 1.1   APTT " 26.2     Microbiology Results (last 7 days)     Procedure Component Value Units Date/Time    Culture, Respiratory with Gram Stain [[id number]] Collected: 12/21/21 1015    Order Status: Completed Specimen: Respiratory from Tracheal Aspirate Updated: 12/21/21 2021     Gram Stain (Respiratory) <10 epithelial cells per low power field.     Gram Stain (Respiratory) Many WBC's     Gram Stain (Respiratory) Few Gram positive cocci    Blood culture [[id number]] Collected: 12/21/21 1010    Order Status: Completed Specimen: Blood from Peripheral, Forearm, Right Updated: 12/21/21 1745     Blood Culture, Routine No Growth to date    Narrative:      Blood cultures from 2 different sites. 4 bottles total.  Please draw before starting antibiotics.    Blood culture [[id number]] Collected: 12/21/21 1032    Order Status: Completed Specimen: Blood from Peripheral, Antecubital, Right Updated: 12/21/21 1745     Blood Culture, Routine No Growth to date    Narrative:      Blood cultures x 2 different sites. 4 bottles total. Please  draw cultures before administering antibiotics.        All pertinent labs from the last 24 hours have been reviewed.    Significant Diagnostics:  I have reviewed and interpreted all pertinent imaging results/findings within the past 24 hours.    Assessment/Plan:     * SDH (subdural hematoma)  86F with on eliquis s/p head trauma 1 week prior now with small aSDH and AMS c/b PEA arrest 12/20:    --admitted to [facility]  --CTH: R temp/paretial aSDH 5-6mm thickness, 3-5mm MLS, stable on repeat as well as imaging post PEA arrest  --holding eliquis  --f/up cEEG, no seizrues overnight 12/22  --AED per Epilepsy  --SBP<150, HOB > 30  --Na >135,   --ok for SQH  --no acute neurosurgical intervention at this time, PEA arrest w/up per NCC  --Please page NSGY immediately for any changes in neuro status    Please contact the on call neurosurgery provider with questions or concerns. Provider may be reached via  or during weekday  call only via [employer] [phone].     D/w Dr Nhan Monzon MD  Neurosurgery  [facility] - Neuro Critical Care

## 2021-12-22 NOTE — PLAN OF CARE
Recommendations    1. As medically able, ADAT to cardiac with texture per SLP.   - If po intake <50%, recommend adding Boost Plus BID.     2. If unable to extubate, recommend initiating TF of Peptamen Intense VHP advancing as tolerated to goal rate of 45 mL/hr to provide 1080 kcal, 99 gm protein, and 907 mL free fluid.      3. RD to monitor    Goals: Pt will receive nutrition by RD f/u  Nutrition Goal Status: goal met

## 2021-12-23 LAB
ALBUMIN SERPL BCP-MCNC: 2.8 G/DL (ref 3.5–5.2)
ALLENS TEST: ABNORMAL
ALP SERPL-CCNC: 57 U/L (ref 55–135)
ALT SERPL W/O P-5'-P-CCNC: 22 U/L (ref 10–44)
ANION GAP SERPL CALC-SCNC: 9 MMOL/L (ref 8–16)
ANISOCYTOSIS BLD QL SMEAR: SLIGHT
AST SERPL-CCNC: 11 U/L (ref 10–40)
BASOPHILS # BLD AUTO: 0.03 K/UL (ref 0–0.2)
BASOPHILS NFR BLD: 0.1 % (ref 0–1.9)
BILIRUB SERPL-MCNC: 1.2 MG/DL (ref 0.1–1)
BUN SERPL-MCNC: 45 MG/DL (ref 8–23)
BURR CELLS BLD QL SMEAR: ABNORMAL
CALCIUM SERPL-MCNC: 8 MG/DL (ref 8.7–10.5)
CHLORIDE SERPL-SCNC: 106 MMOL/L (ref 95–110)
CO2 SERPL-SCNC: 22 MMOL/L (ref 23–29)
CREAT SERPL-MCNC: 0.9 MG/DL (ref 0.5–1.4)
DELSYS: ABNORMAL
DIFFERENTIAL METHOD: ABNORMAL
EOSINOPHIL # BLD AUTO: 0 K/UL (ref 0–0.5)
EOSINOPHIL NFR BLD: 0.1 % (ref 0–8)
ERYTHROCYTE [DISTWIDTH] IN BLOOD BY AUTOMATED COUNT: 16.6 % (ref 11.5–14.5)
ERYTHROCYTE [SEDIMENTATION RATE] IN BLOOD BY WESTERGREN METHOD: 8 MM/H
EST. GFR  (AFRICAN AMERICAN): >60 ML/MIN/1.73 M^2
EST. GFR  (NON AFRICAN AMERICAN): 58.9 ML/MIN/1.73 M^2
GLUCOSE SERPL-MCNC: 213 MG/DL (ref 70–110)
HCO3 UR-SCNC: 23 MMOL/L (ref 24–28)
HCT VFR BLD AUTO: 30.6 % (ref 37–48.5)
HGB BLD-MCNC: 9.2 G/DL (ref 12–16)
HYPOCHROMIA BLD QL SMEAR: ABNORMAL
IMM GRANULOCYTES # BLD AUTO: 0.64 K/UL (ref 0–0.04)
IMM GRANULOCYTES NFR BLD AUTO: 2.4 % (ref 0–0.5)
LYMPHOCYTES # BLD AUTO: 1.1 K/UL (ref 1–4.8)
LYMPHOCYTES NFR BLD: 3.9 % (ref 18–48)
MAGNESIUM SERPL-MCNC: 2.1 MG/DL (ref 1.6–2.6)
MCH RBC QN AUTO: 28.4 PG (ref 27–31)
MCHC RBC AUTO-ENTMCNC: 30.1 G/DL (ref 32–36)
MCV RBC AUTO: 94 FL (ref 82–98)
MODE: ABNORMAL
MONOCYTES # BLD AUTO: 4.5 K/UL (ref 0.3–1)
MONOCYTES NFR BLD: 16.4 % (ref 4–15)
NEUTROPHILS # BLD AUTO: 21 K/UL (ref 1.8–7.7)
NEUTROPHILS NFR BLD: 77.1 % (ref 38–73)
NRBC BLD-RTO: 0 /100 WBC
PCO2 BLDA: 27.7 MMHG (ref 35–45)
PEEP: 6
PH SMN: 7.53 [PH] (ref 7.35–7.45)
PHOSPHATE SERPL-MCNC: 2.2 MG/DL (ref 2.7–4.5)
PLATELET # BLD AUTO: 99 K/UL (ref 150–450)
PLATELET BLD QL SMEAR: ABNORMAL
PMV BLD AUTO: 11.8 FL (ref 9.2–12.9)
PO2 BLDA: 184 MMHG (ref 80–100)
POC BE: 0 MMOL/L
POC SATURATED O2: 100 % (ref 95–100)
POC TCO2: 24 MMOL/L (ref 23–27)
POCT GLUCOSE: 129 MG/DL (ref 70–110)
POCT GLUCOSE: 165 MG/DL (ref 70–110)
POCT GLUCOSE: 175 MG/DL (ref 70–110)
POCT GLUCOSE: 186 MG/DL (ref 70–110)
POCT GLUCOSE: 244 MG/DL (ref 70–110)
POCT GLUCOSE: 254 MG/DL (ref 70–110)
POIKILOCYTOSIS BLD QL SMEAR: SLIGHT
POTASSIUM SERPL-SCNC: 4 MMOL/L (ref 3.5–5.1)
PROT SERPL-MCNC: 4.7 G/DL (ref 6–8.4)
PS: 10
RBC # BLD AUTO: 3.24 M/UL (ref 4–5.4)
SAMPLE: ABNORMAL
SITE: ABNORMAL
SODIUM SERPL-SCNC: 137 MMOL/L (ref 136–145)
SP02: 40
VANCOMYCIN SERPL-MCNC: 11.9 UG/ML
VT: 450
WBC # BLD AUTO: 27.22 K/UL (ref 3.9–12.7)

## 2021-12-23 PROCEDURE — 94003 VENT MGMT INPAT SUBQ DAY: CPT

## 2021-12-23 PROCEDURE — 94640 AIRWAY INHALATION TREATMENT: CPT

## 2021-12-23 PROCEDURE — 25000242 PHARM REV CODE 250 ALT 637 W/ HCPCS: Performed by: PSYCHIATRY & NEUROLOGY

## 2021-12-23 PROCEDURE — 27200966 HC CLOSED SUCTION SYSTEM

## 2021-12-23 PROCEDURE — 80053 COMPREHEN METABOLIC PANEL: CPT | Performed by: PSYCHIATRY & NEUROLOGY

## 2021-12-23 PROCEDURE — 94150 VITAL CAPACITY TEST: CPT

## 2021-12-23 PROCEDURE — 25000003 PHARM REV CODE 250

## 2021-12-23 PROCEDURE — C9254 INJECTION, LACOSAMIDE: HCPCS | Performed by: NURSE PRACTITIONER

## 2021-12-23 PROCEDURE — 20000000 HC ICU ROOM

## 2021-12-23 PROCEDURE — 25000003 PHARM REV CODE 250: Performed by: NURSE PRACTITIONER

## 2021-12-23 PROCEDURE — 63600175 PHARM REV CODE 636 W HCPCS

## 2021-12-23 PROCEDURE — 80202 ASSAY OF VANCOMYCIN: CPT | Performed by: PSYCHIATRY & NEUROLOGY

## 2021-12-23 PROCEDURE — 99900026 HC AIRWAY MAINTENANCE (STAT)

## 2021-12-23 PROCEDURE — 99291 PR CRITICAL CARE, E/M 30-74 MINUTES: ICD-10-PCS | Mod: ,,, | Performed by: PSYCHIATRY & NEUROLOGY

## 2021-12-23 PROCEDURE — A4216 STERILE WATER/SALINE, 10 ML: HCPCS | Performed by: PSYCHIATRY & NEUROLOGY

## 2021-12-23 PROCEDURE — 99233 PR SUBSEQUENT HOSPITAL CARE,LEVL III: ICD-10-PCS | Mod: ,,, | Performed by: NEUROLOGICAL SURGERY

## 2021-12-23 PROCEDURE — 94668 MNPJ CHEST WALL SBSQ: CPT

## 2021-12-23 PROCEDURE — 99233 SBSQ HOSP IP/OBS HIGH 50: CPT | Mod: ,,, | Performed by: NEUROLOGICAL SURGERY

## 2021-12-23 PROCEDURE — 27000221 HC OXYGEN, UP TO 24 HOURS

## 2021-12-23 PROCEDURE — 99900035 HC TECH TIME PER 15 MIN (STAT)

## 2021-12-23 PROCEDURE — 84100 ASSAY OF PHOSPHORUS: CPT | Performed by: PSYCHIATRY & NEUROLOGY

## 2021-12-23 PROCEDURE — 63600175 PHARM REV CODE 636 W HCPCS: Performed by: NURSE PRACTITIONER

## 2021-12-23 PROCEDURE — 63600175 PHARM REV CODE 636 W HCPCS: Performed by: PSYCHIATRY & NEUROLOGY

## 2021-12-23 PROCEDURE — 25000003 PHARM REV CODE 250: Performed by: PSYCHIATRY & NEUROLOGY

## 2021-12-23 PROCEDURE — 94010 BREATHING CAPACITY TEST: CPT

## 2021-12-23 PROCEDURE — 83735 ASSAY OF MAGNESIUM: CPT | Performed by: PSYCHIATRY & NEUROLOGY

## 2021-12-23 PROCEDURE — 85025 COMPLETE CBC W/AUTO DIFF WBC: CPT | Performed by: PSYCHIATRY & NEUROLOGY

## 2021-12-23 PROCEDURE — 99291 CRITICAL CARE FIRST HOUR: CPT | Mod: ,,, | Performed by: PSYCHIATRY & NEUROLOGY

## 2021-12-23 PROCEDURE — 94761 N-INVAS EAR/PLS OXIMETRY MLT: CPT

## 2021-12-23 RX ORDER — CEFEPIME HYDROCHLORIDE 1 G/1
1 INJECTION, POWDER, FOR SOLUTION INTRAMUSCULAR; INTRAVENOUS
Status: COMPLETED | OUTPATIENT
Start: 2021-12-23 | End: 2021-12-27

## 2021-12-23 RX ORDER — VANCOMYCIN HCL IN 5 % DEXTROSE 1G/250ML
1000 PLASTIC BAG, INJECTION (ML) INTRAVENOUS
Status: DISCONTINUED | OUTPATIENT
Start: 2021-12-23 | End: 2021-12-23

## 2021-12-23 RX ORDER — DEXAMETHASONE SODIUM PHOSPHATE 4 MG/ML
10 INJECTION, SOLUTION INTRA-ARTICULAR; INTRALESIONAL; INTRAMUSCULAR; INTRAVENOUS; SOFT TISSUE ONCE
Status: COMPLETED | OUTPATIENT
Start: 2021-12-23 | End: 2021-12-23

## 2021-12-23 RX ORDER — DEXAMETHASONE SODIUM PHOSPHATE 4 MG/ML
4 INJECTION, SOLUTION INTRA-ARTICULAR; INTRALESIONAL; INTRAMUSCULAR; INTRAVENOUS; SOFT TISSUE EVERY 8 HOURS
Status: COMPLETED | OUTPATIENT
Start: 2021-12-23 | End: 2021-12-24

## 2021-12-23 RX ORDER — VANCOMYCIN HCL IN 5 % DEXTROSE 1G/250ML
1000 PLASTIC BAG, INJECTION (ML) INTRAVENOUS
Status: ACTIVE | OUTPATIENT
Start: 2021-12-24 | End: 2021-12-23

## 2021-12-23 RX ADMIN — INSULIN ASPART 1 UNITS: 100 INJECTION, SOLUTION INTRAVENOUS; SUBCUTANEOUS at 08:12

## 2021-12-23 RX ADMIN — IPRATROPIUM BROMIDE AND ALBUTEROL SULFATE 3 ML: 2.5; .5 SOLUTION RESPIRATORY (INHALATION) at 11:12

## 2021-12-23 RX ADMIN — METRONIDAZOLE 500 MG: 500 TABLET ORAL at 12:12

## 2021-12-23 RX ADMIN — DEXMEDETOMIDINE HYDROCHLORIDE 0.08 MCG/KG/HR: 4 INJECTION, SOLUTION INTRAVENOUS at 02:12

## 2021-12-23 RX ADMIN — ACETYLCYSTEINE 4 ML: 100 INHALANT RESPIRATORY (INHALATION) at 12:12

## 2021-12-23 RX ADMIN — IPRATROPIUM BROMIDE AND ALBUTEROL SULFATE 3 ML: 2.5; .5 SOLUTION RESPIRATORY (INHALATION) at 03:12

## 2021-12-23 RX ADMIN — Medication 10 ML: at 12:12

## 2021-12-23 RX ADMIN — SODIUM CHLORIDE 30 MG/ML INHALATION SOLUTION 4 ML: 30 SOLUTION INHALANT at 07:12

## 2021-12-23 RX ADMIN — IPRATROPIUM BROMIDE AND ALBUTEROL SULFATE 3 ML: 2.5; .5 SOLUTION RESPIRATORY (INHALATION) at 04:12

## 2021-12-23 RX ADMIN — ACETYLCYSTEINE 4 ML: 100 INHALANT RESPIRATORY (INHALATION) at 07:12

## 2021-12-23 RX ADMIN — ACETYLCYSTEINE 4 ML: 100 INHALANT RESPIRATORY (INHALATION) at 04:12

## 2021-12-23 RX ADMIN — IPRATROPIUM BROMIDE AND ALBUTEROL SULFATE 3 ML: 2.5; .5 SOLUTION RESPIRATORY (INHALATION) at 07:12

## 2021-12-23 RX ADMIN — SODIUM CHLORIDE 30 MG/ML INHALATION SOLUTION 4 ML: 30 SOLUTION INHALANT at 12:12

## 2021-12-23 RX ADMIN — SODIUM CHLORIDE 100 MG: 9 INJECTION, SOLUTION INTRAVENOUS at 08:12

## 2021-12-23 RX ADMIN — INSULIN ASPART 6 UNITS: 100 INJECTION, SOLUTION INTRAVENOUS; SUBCUTANEOUS at 04:12

## 2021-12-23 RX ADMIN — SODIUM CHLORIDE 30 MG/ML INHALATION SOLUTION 4 ML: 30 SOLUTION INHALANT at 03:12

## 2021-12-23 RX ADMIN — SODIUM CHLORIDE, SODIUM LACTATE, POTASSIUM CHLORIDE, AND CALCIUM CHLORIDE 500 ML: .6; .31; .03; .02 INJECTION, SOLUTION INTRAVENOUS at 12:12

## 2021-12-23 RX ADMIN — Medication 10 ML: at 08:12

## 2021-12-23 RX ADMIN — HEPARIN SODIUM 5000 UNITS: 5000 INJECTION INTRAVENOUS; SUBCUTANEOUS at 10:12

## 2021-12-23 RX ADMIN — IPRATROPIUM BROMIDE AND ALBUTEROL SULFATE 3 ML: 2.5; .5 SOLUTION RESPIRATORY (INHALATION) at 12:12

## 2021-12-23 RX ADMIN — SODIUM CHLORIDE 30 MG/ML INHALATION SOLUTION 4 ML: 30 SOLUTION INHALANT at 04:12

## 2021-12-23 RX ADMIN — VANCOMYCIN HYDROCHLORIDE 1000 MG: 1 INJECTION, POWDER, LYOPHILIZED, FOR SOLUTION INTRAVENOUS at 09:12

## 2021-12-23 RX ADMIN — Medication 10 ML: at 06:12

## 2021-12-23 RX ADMIN — CEFEPIME 1 G: 1 INJECTION, POWDER, FOR SOLUTION INTRAMUSCULAR; INTRAVENOUS at 05:12

## 2021-12-23 RX ADMIN — DEXAMETHASONE SODIUM PHOSPHATE 4 MG: 4 INJECTION INTRA-ARTICULAR; INTRALESIONAL; INTRAMUSCULAR; INTRAVENOUS; SOFT TISSUE at 05:12

## 2021-12-23 RX ADMIN — INSULIN ASPART 2 UNITS: 100 INJECTION, SOLUTION INTRAVENOUS; SUBCUTANEOUS at 06:12

## 2021-12-23 RX ADMIN — DEXAMETHASONE SODIUM PHOSPHATE 10 MG: 4 INJECTION INTRA-ARTICULAR; INTRALESIONAL; INTRAMUSCULAR; INTRAVENOUS; SOFT TISSUE at 10:12

## 2021-12-23 RX ADMIN — SODIUM CHLORIDE 30 MG/ML INHALATION SOLUTION 4 ML: 30 SOLUTION INHALANT at 11:12

## 2021-12-23 RX ADMIN — CEFEPIME 1 G: 1 INJECTION, POWDER, FOR SOLUTION INTRAMUSCULAR; INTRAVENOUS at 08:12

## 2021-12-23 RX ADMIN — HEPARIN SODIUM 5000 UNITS: 5000 INJECTION INTRAVENOUS; SUBCUTANEOUS at 06:12

## 2021-12-23 RX ADMIN — DEXMEDETOMIDINE HYDROCHLORIDE 0.4 MCG/KG/HR: 4 INJECTION, SOLUTION INTRAVENOUS at 11:12

## 2021-12-23 RX ADMIN — METHYLPREDNISOLONE SODIUM SUCCINATE 20 MG: 40 INJECTION, POWDER, FOR SOLUTION INTRAMUSCULAR; INTRAVENOUS at 08:12

## 2021-12-23 RX ADMIN — HEPARIN SODIUM 5000 UNITS: 5000 INJECTION INTRAVENOUS; SUBCUTANEOUS at 02:12

## 2021-12-23 NOTE — PLAN OF CARE
[facility] Care Plan    POC reviewed with Selma Lux and family at 1400. Pt verbalized understanding. Questions and concerns addressed. No acute events today. Pt progressing toward goals. Trial to extubate. Patient on precedex.  Will continue to monitor. See below and flowsheets for full assessment and VS info.       Neuro:  [place] Coma Scale  Best Eye Response: 3-->(E3) to speech  Best Motor Response: 6-->(M6) obeys commands  Best Verbal Response: 1-->(V1) none  [place] Coma Scale Score: 10  Assessment Qualifiers: patient intubated  Pupil PERRLA: yes     24 hr Temp:  [97.5 °F (36.4 °C)-98.2 °F (36.8 °C)]     CV:   Rhythm: atrial rhythm  BP goals:   SBP < 160  MAP > 65    Resp:   O2 Device (Oxygen Therapy): ventilator  Vent Mode: Spont  Set Rate: 8 BPM  Oxygen Concentration (%): 40  Vt Set: 450 mL  PEEP/CPAP: 5 cmH20  Pressure Support: 10 cmH20    Plan: wean to extubate    GI/:  SCOOBY Total Score: 0  Diet/Nutrition Received: NPO  Last Bowel Movement: 12/21/21  Voiding Characteristics: external catheter    Intake/Output Summary (Last 24 hours) at 12/23/2021 1636  Last data filed at 12/23/2021 1605  Gross per 24 hour   Intake 2329.94 ml   Output 1000 ml   Net 1329.94 ml     Unmeasured Output  Urine Occurrence: 1  Stool Occurrence: 1  Pad Count: 1    Labs/Accuchecks:  Recent Labs   Lab 12/23/21  0341   WBC 27.22*   RBC 3.24*   HGB 9.2*   HCT 30.6*   PLT 99*      Recent Labs   Lab 12/23/21  0341      K 4.0   CO2 22*      BUN 45*   CREATININE 0.9   ALKPHOS 57   ALT 22   AST 11   BILITOT 1.2*      Recent Labs   Lab 12/21/21  0255   INR 1.1   APTT 26.2    No results for input(s): CPK, CPKMB, TROPONINI, MB in the last 168 hours.    Electrolytes: Electrolytes replaced  Accuchecks: Q4H    Gtts:   dexmedetomidine (PRECEDEX) infusion 0.2 mcg/kg/hr (12/23/21 1605)    NORepinephrine bitartrate-D5W 0.04 mcg/kg/min (12/23/21 0605)       LDA/Wounds:  Lines/Drains/Airways       Peripherally Inserted Central Catheter Line               PICC Double Lumen 12/15/21 1616 right basilic 8 days              Airway                   Airway - Non-Surgical 12/21/21 0239 Endotracheal Tube 2 days       Airway Anesthesia 12/21/21 2 days              Arterial Line              Arterial Line 12/21/21 0303 Left Brachial 2 days                  Wounds: No  Wound care consulted: No

## 2021-12-23 NOTE — PLAN OF CARE
[facility]  Discharge Reassessment    Primary Care Provider: Bhargav Hirsch MD    Expected Discharge Date: 12/29/2021     Patient intubated on vent.  Not medically stable for discharge.      Reassessment (most recent)     Discharge Reassessment - 12/23/21 1250        Discharge Reassessment    Assessment Type Discharge Planning Reassessment     Did the patient's condition or plan change since previous assessment? No     Discharge Plan discussed with: Adult children     Communicated LETICIA with patient/caregiver Date not available/Unable to determine     Discharge Plan A Long-term acute care facility (LTAC)     Discharge Plan B Skilled Nursing Facility;Rehab     DME Needed Upon Discharge  none     Discharge Barriers Identified None     Why the patient remains in the hospital Requires continued medical care                 Charlette Benitez RN, CCRN-K, [facility]  Neuro-Critical Care   [phone]

## 2021-12-23 NOTE — ASSESSMENT & PLAN NOTE
86F with on eliquis s/p head trauma 1 week prior now with small aSDH and AMS c/b PEA arrest 12/20:    --admitted to NCC  --CTH: R temp/paretial aSDH 5-6mm thickness, 3-5mm MLS, stable on repeat as well as imaging post PEA arrest  --holding eliquis  --f/up cEEG, no seizrues overnight 12/22  --AED per Epilepsy  --SBP<150, HOB > 30  --Na >135,   --ok for SQH  --no acute neurosurgical intervention at this time, PEA arrest w/up per NCC  --Please page NSGY immediately for any changes in neuro status    Please contact the on call neurosurgery provider with questions or concerns. Provider may be reached via     D/w Dr Justin

## 2021-12-23 NOTE — PROGRESS NOTES
[facility]  Neurosurgery  Progress Note    Subjective:     History of Present Illness: 86-year-old female with past medical history of TIA comes to the emergency department for AMS.  She was found on the floor about 1 hour ago only responding to painful stimuli.  Patient occasionally says her name.GCS 12 noted for EMS.  Patient does take blood thinners. She had a mechanical fall out of bed on Thursday hitting her head and has been complaining of intermittent headaches since. Her last dose of eliquis was on 12/11. NSGY consulted for CTH with SDH      Post-Op Info:  * No surgery found *         Interval History:  No acute events overnight neuro exam stable.  Patient remains vented.    Medications:  Continuous Infusions:   dexmedetomidine (PRECEDEX) infusion 0.2 mcg/kg/hr (12/23/21 1405)    NORepinephrine bitartrate-D5W 0.04 mcg/kg/min (12/23/21 0605)     Scheduled Meds:   acetylcysteine 100 mg/ml (10%)  4 mL Nebulization QID WAKE    albuterol-ipratropium  3 mL Nebulization Q4H    amLODIPine  10 mg Per NG tube Daily    atorvastatin  40 mg Per NG tube Daily    ceFEPime (MAXIPIME) IVPB  1 g Intravenous Q12H    dexamethasone  4 mg Intravenous Q8H    heparin (porcine)  5,000 Units Subcutaneous Q8H    hydrOXYchloroQUINE  200 mg Per NG tube BID    lacosamide (VIMPAT) IVPB  100 mg Intravenous Q12H    methylPREDNISolone sodium succinate injection  20 mg Intravenous Daily    metroNIDAZOLE  500 mg Per NG tube Q8H    montelukast  10 mg Per NG tube Daily    mycophenolate mofetil  500 mg Per NG tube BID    senna-docusate 8.6-50 mg  1 tablet Per NG tube Daily    sodium chloride 0.9%  250 mL Intravenous Once    sodium chloride 0.9%  10 mL Intravenous Q6H    sodium chloride 3%  4 mL Nebulization Q4H    [START ON 12/24/2021] vancomycin (VANCOCIN) IVPB  1,000 mg Intravenous Q24H     PRN Meds:acetaminophen, albuterol-ipratropium, calcium gluconate IVPB, calcium gluconate IVPB, calcium gluconate  IVPB, dextrose 50%, fentaNYL, glucagon (human recombinant), insulin aspart U-100, labetalol, magnesium sulfate IVPB, magnesium sulfate IVPB, ondansetron, potassium chloride in water **AND** potassium chloride in water **AND** potassium chloride in water, Flushing PICC Protocol **AND** sodium chloride 0.9% **AND** sodium chloride 0.9%, Pharmacy to dose Vancomycin consult **AND** vancomycin - pharmacy to dose     Review of Systems   Reason unable to perform ROS: intubated.     Objective:     Weight: 67 kg (147 lb 11.3 oz)  Body mass index is 25.35 kg/m².  Vital Signs (Most Recent):  Temp: 98 °F (36.7 °C) (12/23/21 1105)  Pulse: 105 (12/23/21 1405)  Resp: 16 (12/23/21 1405)  BP: (!) 132/56 (12/23/21 1405)  SpO2: 100 % (12/23/21 1405) Vital Signs (24h Range):  Temp:  [97.5 °F (36.4 °C)-98.1 °F (36.7 °C)] 98 °F (36.7 °C)  Pulse:  [] 105  Resp:  [] 16  SpO2:  [85 %-100 %] 100 %  BP: ()/(39-79) 132/56  Arterial Line BP: (100-149)/(44-80) 133/55     Date 12/23/21 0700 - 12/24/21 0659   Shift 3044-4956 8908-9591 8224-0555 24 Hour Total   INTAKE   I.V.(mL/kg) 6.7(0.1)   6.7(0.1)   NG/GT 0   0   IV Piggyback 215.1   215.1   Shift Total(mL/kg) 221.8(3.3)   221.8(3.3)   OUTPUT   Urine(mL/kg/hr) 550(1)   550   Shift Total(mL/kg) 550(8.2)   550(8.2)   Weight (kg) 67 67 67 67              Vent Mode: Spont  Oxygen Concentration (%):  [40-50] 40  Resp Rate Total:  [14 br/min-39 br/min] 21 br/min  Vt Set:  [450 mL] 450 mL  PEEP/CPAP:  [5 cmH20-6 cmH20] 5 cmH20  Pressure Support:  [10 cmH20] 10 cmH20  Mean Airway Pressure:  [8.3 cmH20-9.9 cmH20] 8.3 cmH20         NG/OG Tube 12/17/21 1405 Left nostril (Active)   Placement Check placement verified by aspirate characteristics 12/22/21 0305   Tolerance no signs/symptoms of discomfort 12/22/21 0305   Securement secured to nostril center 12/22/21 0305   Clamp Status/Tolerance no abdominal distention;no abdominal discomfort;no emesis;no nausea;no residual;no restlessness  "12/22/21 0305   Suction Setting/Drainage Method suction at the bedside 12/22/21 0305   Insertion Site Appearance no redness, warmth, tenderness, skin breakdown, drainage 12/22/21 0305   Flush/Irrigation flushed w/;water;no resistance met 12/22/21 0305   Feeding Type continuous;by pump 12/22/21 0305   Feeding Action feeding continued 12/22/21 0305   Current Rate (mL/hr) 10 mL/hr 12/21/21 1905   Goal Rate (mL/hr) 10 mL/hr 12/21/21 1905   Intake (mL) 100 mL 12/20/21 0800   Water Bolus (mL) 200 mL 12/21/21 1600   Rate Formula Tube Feeding (mL/hr) 10 mL/hr 12/21/21 1905   Formula Name Isosource 12/22/21 0305   Intake (mL) - Formula Tube Feeding 20 12/21/21 1800   Residual Amount (ml) 0 ml 12/21/21 1905            Urethral Catheter 12/21/21 0300 Temperature probe 16 Fr. (Active)   Site Assessment Clean;Intact 12/22/21 0305   Collection Container Urimeter 12/22/21 0305   Securement Method secured to top of thigh w/ adhesive device 12/22/21 0305   Catheter Care Performed yes 12/22/21 0305   Reason for Continuing Urinary Catheterization Critically ill in ICU and requiring hourly monitoring of intake/output 12/22/21 0305   CAUTI Prevention Bundle StatLock in place w 1" slack;Intact seal between catheter & drainage tubing;Drainage bag/urimeter off the floor;Sheeting clip in use;No dependent loops or kinks;Drainage bag/urimeter not overfilled (<2/3 full);Drainage bag/urimeter below bladder 12/21/21 1905   Output (mL) 20 mL 12/22/21 0700       Physical Exam:    Neurological:   [id number]  PERRL  EOMI  Face Symmetric  FCx4 R > L       Significant Labs:  Recent Labs   Lab 12/22/21  0326 12/23/21  0341   * 213*    137   K 4.2 4.0    106   CO2 25 22*   BUN 45* 45*   CREATININE 1.1 0.9   CALCIUM 8.3* 8.0*   MG 2.0 2.1     Recent Labs   Lab 12/22/21  0326 12/23/21  0341   WBC 23.66* 27.22*   HGB 10.2* 9.2*   HCT 33.5* 30.6*   PLT 77* 99*     No results for input(s): LABPT, INR, APTT in the last 48 hours.  Microbiology " Results (last 7 days)     Procedure Component Value Units Date/Time    Blood culture [[id number]] Collected: 12/21/21 1032    Order Status: Completed Specimen: Blood from Peripheral, Antecubital, Right Updated: 12/23/21 1212     Blood Culture, Routine No Growth to date      No Growth to date      No Growth to date    Narrative:      Blood cultures x 2 different sites. 4 bottles total. Please  draw cultures before administering antibiotics.    Blood culture [[id number]] Collected: 12/21/21 1010    Order Status: Completed Specimen: Blood from Peripheral, Forearm, Right Updated: 12/23/21 1212     Blood Culture, Routine No Growth to date      No Growth to date      No Growth to date    Narrative:      Blood cultures from 2 different sites. 4 bottles total.  Please draw before starting antibiotics.    Culture, Respiratory with Gram Stain [[id number]]  (Abnormal) Collected: 12/21/21 1015    Order Status: Completed Specimen: Respiratory from Tracheal Aspirate Updated: 12/23/21 1048     Respiratory Culture GRAM NEGATIVE CARLOS  Few  Identification and susceptibility pending       Gram Stain (Respiratory) <10 epithelial cells per low power field.     Gram Stain (Respiratory) Many WBC's     Gram Stain (Respiratory) Few Gram positive cocci        All pertinent labs from the last 24 hours have been reviewed.    Significant Diagnostics:  I have reviewed and interpreted all pertinent imaging results/findings within the past 24 hours.    Assessment/Plan:     * SDH (subdural hematoma)  86F with on eliquis s/p head trauma 1 week prior now with small aSDH and AMS c/b PEA arrest 12/20:    --admitted to [facility]  --CTH: R temp/paretial aSDH 5-6mm thickness, 3-5mm MLS, stable on repeat as well as imaging post PEA arrest  --holding eliquis  --f/up cEEG, no seizrues overnight 12/22  --AED per Epilepsy  --SBP<150, HOB > 30  --Na >135,   --ok for SQH  --no acute neurosurgical intervention at this time, PEA arrest w/up per NCC  --Please page NSGY  immediately for any changes in neuro status    Please contact the on call neurosurgery provider with questions or concerns. Provider may be reached via     D/w Dr Nhan Gonzalez MD  Neurosurgery  [facility]

## 2021-12-23 NOTE — PROGRESS NOTES
[facility]  Neurocritical Care  Progress Note    Admit Date: 12/13/2021  Service Date: 12/23/2021  Length of Stay: 10    Subjective:     Chief Complaint: SDH (subdural hematoma)    History of Present Illness:   The patient is an 86-year-old female with past medical history of TIA, RA and Afib of Eliquis admitted to [facility] with Right SDH.  She was found on the floor this morning only responding to painful stimuli.  Patient occasionally says her name.GCS 12 noted for EMS.  PCC administered. She had a mechanical fall out of bed on Thursday hitting her head and has been complaining of intermittent headaches since. Her last dose of eliquis was on 12/11. Pending repeat CT head. NSGY consulted. In addition patient was found to be hyponatremic at 118-central line placed and hypertonic saline infusion initiated. Pending repeat CT head. Patient admitted to [facility] for close monitoring and higher level of care.       Hospital Course: 12/13/2021: patient admitted to [facility] s/p fall on 12/11 on Eliquis  12/14/2021 wheezing, prolonged expiratory phase.   12/15/2021 copious respiratory secretions. Remains encephalopathic. Review EEG  12/16/2021 improved respiratory secretions. D/c nasal tracheal airway if minimal secretions.   12/17/2021 restless.   12/18/2021 lethargic, rising BUN/Cr ratio. Fluid resuscitation. Received flumazenil for BDZ   12/19/2021 encephalopathy overnight. Work up neg so far. BUN/Cr >20 from volume contraction.   12/20/2021 left facial twitching overnight. Loaded with vimpat.   12/21/2021 event overnight cardiac arrest/PEA likely related to respiratory distress. Intubated.   12/22/2021 Add precedex to enable vent weaning.  12/23/2021   extubation trial today      Review of Symptoms: encephalopathic cannot participate    Medications:  Continuous Infusions:   dexmedetomidine (PRECEDEX) infusion 0.2 mcg/kg/hr (12/23/21 0805)    NORepinephrine bitartrate-D5W 0.04 mcg/kg/min (12/23/21 0605)      Scheduled Meds:   acetylcysteine 100 mg/ml (10%)  4 mL Nebulization QID WAKE    albuterol-ipratropium  3 mL Nebulization Q4H    amLODIPine  10 mg Per NG tube Daily    atorvastatin  40 mg Per NG tube Daily    ceFEPime (MAXIPIME) IVPB  1 g Intravenous Q12H    heparin (porcine)  5,000 Units Subcutaneous Q8H    hydrOXYchloroQUINE  200 mg Per NG tube BID    lacosamide (VIMPAT) IVPB  100 mg Intravenous Q12H    methylPREDNISolone sodium succinate injection  20 mg Intravenous Daily    metroNIDAZOLE  500 mg Per NG tube Q8H    montelukast  10 mg Per NG tube Daily    mycophenolate mofetil  500 mg Per NG tube BID    senna-docusate 8.6-50 mg  1 tablet Per NG tube Daily    sodium chloride 0.9%  250 mL Intravenous Once    sodium chloride 0.9%  10 mL Intravenous Q6H    sodium chloride 3%  4 mL Nebulization Q4H    vancomycin (VANCOCIN) IVPB  1,000 mg Intravenous Q24H     PRN Meds:.acetaminophen, albuterol-ipratropium, calcium gluconate IVPB, calcium gluconate IVPB, calcium gluconate IVPB, dextrose 50%, fentaNYL, glucagon (human recombinant), insulin aspart U-100, labetalol, magnesium sulfate IVPB, magnesium sulfate IVPB, ondansetron, potassium chloride in water **AND** potassium chloride in water **AND** potassium chloride in water, Flushing PICC Protocol **AND** sodium chloride 0.9% **AND** sodium chloride 0.9%, Pharmacy to dose Vancomycin consult **AND** vancomycin - pharmacy to dose    OBJECTIVE:   Vital Signs (Most Recent):   Temp: 97.8 °F (36.6 °C) (12/23/21 0705)  Pulse: 98 (12/23/21 0905)  Resp: 18 (12/23/21 0905)  BP: (!) 117/51 (12/23/21 0905)  SpO2: (!) 93 % (12/23/21 0905)    Vital Signs (24h Range):   Temp:  [97.5 °F (36.4 °C)-98.1 °F (36.7 °C)] 97.8 °F (36.6 °C)  Pulse:  [] 98  Resp:  [13-46] 18  SpO2:  [85 %-100 %] 93 %  BP: ()/(39-79) 117/51  Arterial Line BP: (100-146)/(47-80) 115/47    ICP/CPP (Last 24h):        I & O (Last 24h):     Intake/Output Summary (Last 24 hours) at  12/23/2021 0954  Last data filed at 12/23/2021 0805  Gross per 24 hour   Intake 2860.55 ml   Output 450 ml   Net 2410.55 ml     Physical Exam:unchanged from previous day  GA: awake, comfortable, no acute distress.   HEENT: No scleral icterus or JVD. Neck supple  Pulmonary: Air entry equal to auscultation A/P/L. Wheezing no, crackles no  Cardiac: RRR, S1 & S2 w/o rubs/murmurs/gallops.   Abdominal: soft, non-tender, bowel sounds present x 4. No appreciable hepatosplenomegaly.  Skin: No jaundice, rashes, or visible lesions.  Neuro:  --Mental Status:  Sedated but easily arousable, follows one step commands. Able to show two fingers Spontaneous eye opening.   --Pupils 3.5 mm, PERRL.   --Corneal reflex not done in this arousable patient, gag, cough intact.  Decreased movement (improved) on the left UE and LE  MSK:  No edema in UE and LE    Vent Data:   Vent Mode: Spont  Oxygen Concentration (%):  [40-50] 40  Resp Rate Total:  [13 br/min-39 br/min] 17 br/min  Vt Set:  [450 mL] 450 mL  PEEP/CPAP:  [5 cmH20-6 cmH20] 5 cmH20  Pressure Support:  [10 cmH20] 10 cmH20  Mean Airway Pressure:  [8.3 cmH20-9.9 cmH20] 8.3 cmH20    Lines/Drains/Airway:        Airway - Non-Surgical 12/21/21 0239 Endotracheal Tube (Active)   Secured at 24 cm 12/23/21 0751   Measured At Lips 12/23/21 0751   Secured Location Right 12/23/21 0751   Secured by Commercial tube fajardo 12/23/21 0751   Bite Block right 12/23/21 0751   Site Condition Cool;Dry 12/23/21 0751   Status Intact;Secured;Patent 12/23/21 0751   Site Assessment Clean;Dry 12/23/21 0751   Cuff Pressure 34 cm H2O 12/23/21 0751          Airway Anesthesia 12/21/21 (Active)      PICC Double Lumen 12/15/21 1616 right basilic (Active)   Verification by X-ray Yes 12/23/21 0705   Site Assessment No drainage;No redness;No swelling;No warmth 12/23/21 0705   Extremity Assessment Distal to IV No abnormal discoloration 12/23/21 0705   Line Securement Device Secured with sutureless device 12/23/21 0705    Dressing Type Biopatch in place;Central line dressing 12/23/21 0705   Dressing Status Clean;Dry;Intact 12/23/21 0705   Dressing Intervention Integrity maintained 12/23/21 0705   Date on Dressing 12/15/21 12/23/21 0705   Dressing Due to be Changed 12/22/21 12/23/21 0705   Left Lumen Patency/Care Infusing 12/23/21 0705   Right Lumen Patency/Care Infusing 12/23/21 0705   Current Insertion Depth (cm) 33 cm 12/15/21 1616   Current Exposed Catheter (cm) 0 cm 12/15/21 1616   Extremity Circumference (cm) 33 cm 12/15/21 1616   Waveform Normal 12/17/21 1501   Line Necessity Review Poor venous access 12/23/21 0705      Arterial Line 12/21/21 0303 Left Brachial (Active)   Site Assessment Dry;Intact;Clean;No redness;No swelling 12/23/21 0705   Line Status Pulsatile blood flow 12/23/21 0705   Art Line Waveform Appropriate;Square wave test performed;Dampened 12/23/21 0705   Arterial Line Interventions Zeroed and calibrated;Leveled;Connections checked and tightened 12/23/21 0705   Color/Movement/Sensation Capillary refill less than 3 sec 12/23/21 0705   Dressing Type Biopatch in place;Central line dressing 12/23/21 0705   Dressing Status Intact;Dry;Clean 12/23/21 0705   Dressing Intervention Integrity maintained 12/23/21 0705   Dressing Change Due 12/28/21 12/23/21 0705   Tubing Change Due 12/28/21 12/23/21 0705           NG/OG Tube 12/17/21 1405 Left nostril (Active)   Placement Check placement verified by x-ray;placement verified by distal tube length measurement 12/23/21 0305   Tolerance no signs/symptoms of discomfort 12/23/21 0305   Securement secured to nostril center 12/22/21 1500   Clamp Status/Tolerance unclamped 12/23/21 0305   Suction Setting/Drainage Method suction at the bedside 12/23/21 0305   Insertion Site Appearance warm;no redness, warmth, tenderness, skin breakdown, drainage 12/23/21 0305   Flush/Irrigation flushed w/;water;no resistance met 12/23/21 0305   Feeding Type continuous;by pump 12/23/21 0305    Feeding Action feeding continued 12/23/21 0305   Current Rate (mL/hr) 40 mL/hr 12/22/21 1905   Goal Rate (mL/hr) 40 mL/hr 12/22/21 1905   Intake (mL) 50 mL 12/22/21 1700   Water Bolus (mL) 400 mL 12/23/21 0005   Rate Formula Tube Feeding (mL/hr) 40 mL/hr 12/22/21 1905   Formula Name Isosource 12/23/21 0705   Intake (mL) - Formula Tube Feeding 0 12/23/21 0705   Residual Amount (ml) 0 ml 12/21/21 1905         Nutrition/Tube Feeds (if NPO state why): tf    Labs:  ABG:   Recent Labs   Lab 12/23/21  0556   PH 7.526*   PO2 184*   PCO2 27.7*   HCO3 23.0*   POCSATURATED 100   BE 0     BMP:  Recent Labs   Lab 12/23/21  0341      K 4.0      CO2 22*   BUN 45*   CREATININE 0.9   *   MG 2.1   PHOS 2.2*     LFT:   Lab Results   Component Value Date    AST 11 12/23/2021    ALT 22 12/23/2021    ALKPHOS 57 12/23/2021    BILITOT 1.2 (H) 12/23/2021    ALBUMIN 2.8 (L) 12/23/2021    PROT 4.7 (L) 12/23/2021     CBC:   Lab Results   Component Value Date    WBC 27.22 (H) 12/23/2021    HGB 9.2 (L) 12/23/2021    HCT 30.6 (L) 12/23/2021    MCV 94 12/23/2021    PLT 99 (L) 12/23/2021     Microbiology x 7d:   Microbiology Results (last 7 days)     Procedure Component Value Units Date/Time    Blood culture [[id number]] Collected: 12/21/21 1010    Order Status: Completed Specimen: Blood from Peripheral, Forearm, Right Updated: 12/22/21 1212     Blood Culture, Routine No Growth to date      No Growth to date    Narrative:      Blood cultures from 2 different sites. 4 bottles total.  Please draw before starting antibiotics.    Blood culture [[id number]] Collected: 12/21/21 1032    Order Status: Completed Specimen: Blood from Peripheral, Antecubital, Right Updated: 12/22/21 1212     Blood Culture, Routine No Growth to date      No Growth to date    Narrative:      Blood cultures x 2 different sites. 4 bottles total. Please  draw cultures before administering antibiotics.    Culture, Respiratory with Gram Stain [[id number]]  Collected: 12/21/21 1015    Order Status: Completed Specimen: Respiratory from Tracheal Aspirate Updated: 12/21/21 2021     Gram Stain (Respiratory) <10 epithelial cells per low power field.     Gram Stain (Respiratory) Many WBC's     Gram Stain (Respiratory) Few Gram positive cocci        Imaging:  CXR 12/23 -lung fields clear. Lines in place. Widened mediastinum (POA)  I personally reviewed the above image.  Today I independently reviewed pertinent medications, lines/drains/airways, imaging, cardiology results, laboratory results, microbiology results, notably:   ASSESSMENT/PLAN:     Active Hospital Problems    Diagnosis    *SDH (subdural hematoma)    Cardiac arrest    Encephalopathy    Twitching    Stroke    Hyponatremia    Age-related physical debility    Leukocytosis    RA (rheumatoid arthritis)      Neuro:   SDH with brain compression  Acute encephalopathy likely multifactorial  Repeat CT head 12/18 - crowding and mass effect of right hemisphere from overlying SDH.   MRI brain 12/19 - demonstrated SDH with mass effect. No large territory ischemia to explain neurological decline  Received flumazenil 12/18 for ativan reversal.   Loaded with vimpat for left facial twitching on 12/19.   Review cEEG with epilepsy team  Precedex for vent comfort with fentanyl PRN    Pulmonary:   Solumedrol (conversion from home regimen of prednisone)  Duonebs/3% nacl/mucomyst/Chest PT  CXR/ABG daily   Extubation trial today     Cardiac:   SBP goal <160mmhg  EF 65%  Post cardiac arrest/PEA likely respiratory due to secretions - awake, tracks visually    Renal:    BUN/Cr >20 improving following fluid resuscitation  FWF 200cc q4hrly    ID:   Afebrile, leucocytosis - likely steroid effect  Panculture-NGTD, broad spectrum antibiotics d/c  Check procalcitonin -0.59>>0.67  Will monitor    Hem/Onc:   Transfuse for Hb <7 or 8 if associated with hypotension  plt count slight down trend but stable    Endocrine:    Hyperglycemia -  start sliding scale if BS>200 schedule insulin aspart 2units q4hrly  HbA1c 5.3%     Fluids/Electrolytes/Nutrition/GI:   Replete lytes as needed  Tf - advance to goal  Lines:  Art +  CVC + PICC  ETT NA  Hill NA. Bladder scan twice a shift  NG +  PEG NA    Proph:  DVT:SCD/heparin  Constipation:  Last output:bowel regimen as needed  PUP: pepcid  VAP: peridex    Family updated at bedside    Uninterrupted Critical Care/Counseling Time (not including procedures):: 34 mins    Juan Irwin MD, FN  Neurocritical care attending    Full Code    Juan Irwin MD  Neurocritical Care  [facility] - Neuro Critical Care

## 2021-12-23 NOTE — PROGRESS NOTES
Pharmacokinetic Follow-Up Assessment: IV Vancomycin    Assessment/Plan:    - Vanc AM random is 11.9 mcg/mL (this was drawn approximately 18 hrs post-dose)  - Renal function stable, will schedule vanc 1000 mg Q24H  - Goal trough 10-20 mcg/mL empirically, aiming for closer to 15 mcg/mL  - Draw trough prior to third dose on 12/25 at 08:00    Pharmacy will continue to follow and monitor vancomycin.    Please contact pharmacy at extension [phone] with any questions regarding this assessment.     Thank you for the consult,   Mary Olmos, PharmD, [facility]  Neurocritical Care Pharmacist  [phone]       Patient brief summary:  Selma Lux is a 86 y.o. female initiated on antimicrobial therapy with IV Vancomycin for treatment of suspected bacteremia    Drug Allergies:   Review of patient's allergies indicates:  No Known Allergies    Actual Body Weight:   67 kg    Renal Function:   Estimated Creatinine Clearance: 43.8 mL/min (based on SCr of 0.9 mg/dL).,     Dialysis Method (if applicable):  N/A    CBC (last 72 hours):  Recent Labs   Lab Result Units 12/20/21  1007 12/21/21  0248 12/22/21  0326 12/23/21  0341   WBC K/uL 24.32* 27.96* 23.66* 27.22*   Hemoglobin g/dL 10.5* 10.7* 10.2* 9.2*   Hematocrit % 34.7* 36.8* 33.5* 30.6*   Platelets K/uL 93* 88* 77* 99*   Gran % % 82.4* 82.0* 78.8* 77.1*   Lymph % % 2.5* 9.0* 3.2* 3.9*   Mono % % 11.2 8.0 16.7* 16.4*   Eosinophil % % 0.2 0.0 0.0 0.1   Basophil % % 0.1 0.0 0.1 0.1   Differential Method  Automated Manual Automated Automated       Metabolic Panel (last 72 hours):  Recent Labs   Lab Result Units 12/20/21  1007 12/21/21  0248 12/21/21  1020 12/22/21  0326 12/23/21  0341   Sodium mmol/L 146* 147*  --  145 137   Potassium mmol/L 4.0 4.2  --  4.2 4.0   Chloride mmol/L 110 109  --  109 106   CO2 mmol/L 31* 21*  --  25 22*   Glucose mg/dL 197* 215*  --  152* 213*   Glucose, UA   --   --  Negative  --   --    BUN mg/dL 36* 38*  --  45* 45*   Creatinine mg/dL 0.8 1.0  --  1.1 0.9    Albumin g/dL 3.1* 3.1*  --  3.0* 2.8*   Total Bilirubin mg/dL 1.6* 1.5*  --  1.5* 1.2*   Alkaline Phosphatase U/L 54* 71  --  59 57   AST U/L 13 34  --  14 11   ALT U/L 23 36  --  32 22   Magnesium mg/dL 2.1 2.3  --  2.0 2.1   Phosphorus mg/dL 2.1* 5.7*  --  3.3 2.2*       Drug levels (last 3 results):  Recent Labs   Lab Result Units 12/22/21  0326 12/23/21  0341   Vancomycin, Random ug/mL 10.8 11.9       Microbiologic Results:  Microbiology Results (last 7 days)     Procedure Component Value Units Date/Time    Blood culture [[id number]] Collected: 12/21/21 1010    Order Status: Completed Specimen: Blood from Peripheral, Forearm, Right Updated: 12/22/21 1212     Blood Culture, Routine No Growth to date      No Growth to date    Narrative:      Blood cultures from 2 different sites. 4 bottles total.  Please draw before starting antibiotics.    Blood culture [[id number]] Collected: 12/21/21 1032    Order Status: Completed Specimen: Blood from Peripheral, Antecubital, Right Updated: 12/22/21 1212     Blood Culture, Routine No Growth to date      No Growth to date    Narrative:      Blood cultures x 2 different sites. 4 bottles total. Please  draw cultures before administering antibiotics.    Culture, Respiratory with Gram Stain [[id number]] Collected: 12/21/21 1015    Order Status: Completed Specimen: Respiratory from Tracheal Aspirate Updated: 12/21/21 2021     Gram Stain (Respiratory) <10 epithelial cells per low power field.     Gram Stain (Respiratory) Many WBC's     Gram Stain (Respiratory) Few Gram positive cocci

## 2021-12-23 NOTE — SUBJECTIVE & OBJECTIVE
Interval History:  No acute events overnight neuro exam stable.  Patient remains vented.    Medications:  Continuous Infusions:   dexmedetomidine (PRECEDEX) infusion 0.2 mcg/kg/hr (12/23/21 1405)    NORepinephrine bitartrate-D5W 0.04 mcg/kg/min (12/23/21 0605)     Scheduled Meds:   acetylcysteine 100 mg/ml (10%)  4 mL Nebulization QID WAKE    albuterol-ipratropium  3 mL Nebulization Q4H    amLODIPine  10 mg Per NG tube Daily    atorvastatin  40 mg Per NG tube Daily    ceFEPime (MAXIPIME) IVPB  1 g Intravenous Q12H    dexamethasone  4 mg Intravenous Q8H    heparin (porcine)  5,000 Units Subcutaneous Q8H    hydrOXYchloroQUINE  200 mg Per NG tube BID    lacosamide (VIMPAT) IVPB  100 mg Intravenous Q12H    methylPREDNISolone sodium succinate injection  20 mg Intravenous Daily    metroNIDAZOLE  500 mg Per NG tube Q8H    montelukast  10 mg Per NG tube Daily    mycophenolate mofetil  500 mg Per NG tube BID    senna-docusate 8.6-50 mg  1 tablet Per NG tube Daily    sodium chloride 0.9%  250 mL Intravenous Once    sodium chloride 0.9%  10 mL Intravenous Q6H    sodium chloride 3%  4 mL Nebulization Q4H    [START ON 12/24/2021] vancomycin (VANCOCIN) IVPB  1,000 mg Intravenous Q24H     PRN Meds:acetaminophen, albuterol-ipratropium, calcium gluconate IVPB, calcium gluconate IVPB, calcium gluconate IVPB, dextrose 50%, fentaNYL, glucagon (human recombinant), insulin aspart U-100, labetalol, magnesium sulfate IVPB, magnesium sulfate IVPB, ondansetron, potassium chloride in water **AND** potassium chloride in water **AND** potassium chloride in water, Flushing PICC Protocol **AND** sodium chloride 0.9% **AND** sodium chloride 0.9%, Pharmacy to dose Vancomycin consult **AND** vancomycin - pharmacy to dose     Review of Systems   Reason unable to perform ROS: intubated.     Objective:     Weight: 67 kg (147 lb 11.3 oz)  Body mass index is 25.35 kg/m².  Vital Signs (Most Recent):  Temp: 98 °F (36.7 °C) (12/23/21  1105)  Pulse: 105 (12/23/21 1405)  Resp: 16 (12/23/21 1405)  BP: (!) 132/56 (12/23/21 1405)  SpO2: 100 % (12/23/21 1405) Vital Signs (24h Range):  Temp:  [97.5 °F (36.4 °C)-98.1 °F (36.7 °C)] 98 °F (36.7 °C)  Pulse:  [] 105  Resp:  [] 16  SpO2:  [85 %-100 %] 100 %  BP: ()/(39-79) 132/56  Arterial Line BP: (100-149)/(44-80) 133/55     Date 12/23/21 0700 - 12/24/21 0659   Shift 3689-1685 8941-7613 2949-0708 24 Hour Total   INTAKE   I.V.(mL/kg) 6.7(0.1)   6.7(0.1)   NG/GT 0   0   IV Piggyback 215.1   215.1   Shift Total(mL/kg) 221.8(3.3)   221.8(3.3)   OUTPUT   Urine(mL/kg/hr) 550(1)   550   Shift Total(mL/kg) 550(8.2)   550(8.2)   Weight (kg) 67 67 67 67              Vent Mode: Spont  Oxygen Concentration (%):  [40-50] 40  Resp Rate Total:  [14 br/min-39 br/min] 21 br/min  Vt Set:  [450 mL] 450 mL  PEEP/CPAP:  [5 cmH20-6 cmH20] 5 cmH20  Pressure Support:  [10 cmH20] 10 cmH20  Mean Airway Pressure:  [8.3 cmH20-9.9 cmH20] 8.3 cmH20         NG/OG Tube 12/17/21 1405 Left nostril (Active)   Placement Check placement verified by aspirate characteristics 12/22/21 0305   Tolerance no signs/symptoms of discomfort 12/22/21 0305   Securement secured to nostril center 12/22/21 0305   Clamp Status/Tolerance no abdominal distention;no abdominal discomfort;no emesis;no nausea;no residual;no restlessness 12/22/21 0305   Suction Setting/Drainage Method suction at the bedside 12/22/21 0305   Insertion Site Appearance no redness, warmth, tenderness, skin breakdown, drainage 12/22/21 0305   Flush/Irrigation flushed w/;water;no resistance met 12/22/21 0305   Feeding Type continuous;by pump 12/22/21 0305   Feeding Action feeding continued 12/22/21 0305   Current Rate (mL/hr) 10 mL/hr 12/21/21 1905   Goal Rate (mL/hr) 10 mL/hr 12/21/21 1905   Intake (mL) 100 mL 12/20/21 0800   Water Bolus (mL) 200 mL 12/21/21 1600   Rate Formula Tube Feeding (mL/hr) 10 mL/hr 12/21/21 1905   Formula Name Isosource 12/22/21 0305   Intake  "(mL) - Formula Tube Feeding 20 12/21/21 1800   Residual Amount (ml) 0 ml 12/21/21 1905            Urethral Catheter 12/21/21 0300 Temperature probe 16 Fr. (Active)   Site Assessment Clean;Intact 12/22/21 0305   Collection Container Urimeter 12/22/21 0305   Securement Method secured to top of thigh w/ adhesive device 12/22/21 0305   Catheter Care Performed yes 12/22/21 0305   Reason for Continuing Urinary Catheterization Critically ill in ICU and requiring hourly monitoring of intake/output 12/22/21 0305   CAUTI Prevention Bundle StatLock in place w 1" slack;Intact seal between catheter & drainage tubing;Drainage bag/urimeter off the floor;Sheeting clip in use;No dependent loops or kinks;Drainage bag/urimeter not overfilled (<2/3 full);Drainage bag/urimeter below bladder 12/21/21 1905   Output (mL) 20 mL 12/22/21 0700       Physical Exam:    Neurological:   E4VTM6  PERRL  EOMI  Face Symmetric  FCx4 R > L       Significant Labs:  Recent Labs   Lab 12/22/21  0326 12/23/21  0341   * 213*    137   K 4.2 4.0    106   CO2 25 22*   BUN 45* 45*   CREATININE 1.1 0.9   CALCIUM 8.3* 8.0*   MG 2.0 2.1     Recent Labs   Lab 12/22/21  0326 12/23/21  0341   WBC 23.66* 27.22*   HGB 10.2* 9.2*   HCT 33.5* 30.6*   PLT 77* 99*     No results for input(s): LABPT, INR, APTT in the last 48 hours.  Microbiology Results (last 7 days)     Procedure Component Value Units Date/Time    Blood culture [[id number]] Collected: 12/21/21 1032    Order Status: Completed Specimen: Blood from Peripheral, Antecubital, Right Updated: 12/23/21 1212     Blood Culture, Routine No Growth to date      No Growth to date      No Growth to date    Narrative:      Blood cultures x 2 different sites. 4 bottles total. Please  draw cultures before administering antibiotics.    Blood culture [[id number]] Collected: 12/21/21 1010    Order Status: Completed Specimen: Blood from Peripheral, Forearm, Right Updated: 12/23/21 1212     Blood Culture, Routine " No Growth to date      No Growth to date      No Growth to date    Narrative:      Blood cultures from 2 different sites. 4 bottles total.  Please draw before starting antibiotics.    Culture, Respiratory with Gram Stain [[id number]]  (Abnormal) Collected: 12/21/21 1015    Order Status: Completed Specimen: Respiratory from Tracheal Aspirate Updated: 12/23/21 1048     Respiratory Culture GRAM NEGATIVE CARLOS  Few  Identification and susceptibility pending       Gram Stain (Respiratory) <10 epithelial cells per low power field.     Gram Stain (Respiratory) Many WBC's     Gram Stain (Respiratory) Few Gram positive cocci        All pertinent labs from the last 24 hours have been reviewed.    Significant Diagnostics:  I have reviewed and interpreted all pertinent imaging results/findings within the past 24 hours.

## 2021-12-23 NOTE — PLAN OF CARE
[facility] Care Plan    POC reviewed with Selma Lux and and her daughter at 0300. Pt verbalized understanding. Questions and concerns addressed. Levo titrated to maintain MAP >65 and and turned off in the morning.  Precedex gtt 0.2 .  Pt progressing toward goals. Will continue to monitor. See below and flowsheets for full assessment and VS info. Tube feeding held at 0300 ready for extubation in the morning,gave bolus 500 mL of LR      Neuro:  [place] Coma Scale  Best Eye Response: 3-->(E3) to speech (Simultaneous filing. User may not have seen previous data.)  Best Motor Response: 6-->(M6) obeys commands (Simultaneous filing. User may not have seen previous data.)  Best Verbal Response: 1-->(V1) none (Simultaneous filing. User may not have seen previous data.)  [place] Coma Scale Score: 10 (Simultaneous filing. User may not have seen previous data.)  Assessment Qualifiers: patient intubated (Simultaneous filing. User may not have seen previous data.)  Pupil PERRLA: yes     24hr Temp:  [97.5 °F (36.4 °C)-98.1 °F (36.7 °C)]     CV:   Rhythm: atrial rhythm  BP goals:   SBP < 160  MAP > 65    Resp:   O2 Device (Oxygen Therapy): ventilator  Vent Mode: SIMV  Set Rate: 8 BPM  Oxygen Concentration (%): 40  Vt Set: 450 mL  PEEP/CPAP: 6 cmH20  Pressure Support: 10 cmH20        GI/:  SCOOBY Total Score: 0  Diet/Nutrition Received: tube feeding  Last Bowel Movement: 12/21/21 (Simultaneous filing. User may not have seen previous data.)  Voiding Characteristics: urethral catheter (bladder)    Intake/Output Summary (Last 24 hours) at 12/23/2021 0806  Last data filed at 12/23/2021 0705  Gross per 24 hour   Intake 2956.35 ml   Output 480 ml   Net 2476.35 ml     Unmeasured Output  Urine Occurrence: 1  Stool Occurrence: 1  Pad Count: 1    Labs/Accuchecks:  Recent Labs   Lab 12/23/21  0341   WBC 27.22*   RBC 3.24*   HGB 9.2*   HCT 30.6*   PLT 99*      Recent Labs   Lab 12/23/21  0341      K 4.0   CO2 22*      BUN 45*    CREATININE 0.9   ALKPHOS 57   ALT 22   AST 11   BILITOT 1.2*      Recent Labs   Lab 12/21/21  0255   INR 1.1   APTT 26.2    No results for input(s): CPK, CPKMB, TROPONINI, MB in the last 168 hours.    Electrolytes: No replacement orders  Accuchecks: Q4H    Gtts:   dexmedetomidine (PRECEDEX) infusion 0.2 mcg/kg/hr (12/23/21 0705)    NORepinephrine bitartrate-D5W 0.04 mcg/kg/min (12/23/21 0605)       LDA/Wounds:  Lines/Drains/Airways       Peripherally Inserted Central Catheter Line              PICC Double Lumen 12/15/21 1616 right basilic 7 days              Drain                   NG/OG Tube 12/17/21 1405 Left nostril 5 days              Airway                   Airway - Non-Surgical 12/21/21 0239 Endotracheal Tube 2 days       Airway Anesthesia 12/21/21 2 days              Arterial Line              Arterial Line 12/21/21 0303 Left Brachial 2 days                  Wounds: No  Wound care consulted: No

## 2021-12-24 LAB
ALBUMIN SERPL BCP-MCNC: 2.7 G/DL (ref 3.5–5.2)
ALLENS TEST: ABNORMAL
ALP SERPL-CCNC: 53 U/L (ref 55–135)
ALT SERPL W/O P-5'-P-CCNC: 18 U/L (ref 10–44)
ANION GAP SERPL CALC-SCNC: 6 MMOL/L (ref 8–16)
AST SERPL-CCNC: 16 U/L (ref 10–40)
BACTERIA SPEC AEROBE CULT: ABNORMAL
BACTERIA SPEC AEROBE CULT: ABNORMAL
BASOPHILS # BLD AUTO: 0.02 K/UL (ref 0–0.2)
BASOPHILS NFR BLD: 0.1 % (ref 0–1.9)
BILIRUB SERPL-MCNC: 1.2 MG/DL (ref 0.1–1)
BUN SERPL-MCNC: 35 MG/DL (ref 8–23)
CALCIUM SERPL-MCNC: 8.1 MG/DL (ref 8.7–10.5)
CHLORIDE SERPL-SCNC: 109 MMOL/L (ref 95–110)
CO2 SERPL-SCNC: 24 MMOL/L (ref 23–29)
CREAT SERPL-MCNC: 0.7 MG/DL (ref 0.5–1.4)
DIFFERENTIAL METHOD: ABNORMAL
EOSINOPHIL # BLD AUTO: 0 K/UL (ref 0–0.5)
EOSINOPHIL NFR BLD: 0 % (ref 0–8)
ERYTHROCYTE [DISTWIDTH] IN BLOOD BY AUTOMATED COUNT: 16.6 % (ref 11.5–14.5)
EST. GFR  (AFRICAN AMERICAN): >60 ML/MIN/1.73 M^2
EST. GFR  (NON AFRICAN AMERICAN): >60 ML/MIN/1.73 M^2
GLUCOSE SERPL-MCNC: 126 MG/DL (ref 70–110)
GRAM STN SPEC: ABNORMAL
HCO3 UR-SCNC: 23.6 MMOL/L (ref 24–28)
HCT VFR BLD AUTO: 29 % (ref 37–48.5)
HCT VFR BLD CALC: 27 %PCV (ref 36–54)
HGB BLD-MCNC: 8.8 G/DL (ref 12–16)
IMM GRANULOCYTES # BLD AUTO: 0.41 K/UL (ref 0–0.04)
IMM GRANULOCYTES NFR BLD AUTO: 2.4 % (ref 0–0.5)
LYMPHOCYTES # BLD AUTO: 0.4 K/UL (ref 1–4.8)
LYMPHOCYTES NFR BLD: 2 % (ref 18–48)
MAGNESIUM SERPL-MCNC: 2 MG/DL (ref 1.6–2.6)
MCH RBC QN AUTO: 28.4 PG (ref 27–31)
MCHC RBC AUTO-ENTMCNC: 30.3 G/DL (ref 32–36)
MCV RBC AUTO: 94 FL (ref 82–98)
MONOCYTES # BLD AUTO: 1.5 K/UL (ref 0.3–1)
MONOCYTES NFR BLD: 8.8 % (ref 4–15)
NEUTROPHILS # BLD AUTO: 15 K/UL (ref 1.8–7.7)
NEUTROPHILS NFR BLD: 86.7 % (ref 38–73)
NRBC BLD-RTO: 0 /100 WBC
PCO2 BLDA: 33 MMHG (ref 35–45)
PH SMN: 7.46 [PH] (ref 7.35–7.45)
PHOSPHATE SERPL-MCNC: 2.8 MG/DL (ref 2.7–4.5)
PLATELET # BLD AUTO: 96 K/UL (ref 150–450)
PMV BLD AUTO: 12.4 FL (ref 9.2–12.9)
PO2 BLDA: 130 MMHG (ref 80–100)
POC BE: 0 MMOL/L
POC IONIZED CALCIUM: 1.2 MMOL/L (ref 1.06–1.42)
POC SATURATED O2: 99 % (ref 95–100)
POC TCO2: 25 MMOL/L (ref 23–27)
POCT GLUCOSE: 117 MG/DL (ref 70–110)
POCT GLUCOSE: 133 MG/DL (ref 70–110)
POCT GLUCOSE: 136 MG/DL (ref 70–110)
POCT GLUCOSE: 146 MG/DL (ref 70–110)
POCT GLUCOSE: 148 MG/DL (ref 70–110)
POTASSIUM BLD-SCNC: 4.2 MMOL/L (ref 3.5–5.1)
POTASSIUM SERPL-SCNC: 4.4 MMOL/L (ref 3.5–5.1)
PROT SERPL-MCNC: 4.5 G/DL (ref 6–8.4)
RBC # BLD AUTO: 3.1 M/UL (ref 4–5.4)
SAMPLE: ABNORMAL
SITE: ABNORMAL
SODIUM BLD-SCNC: 140 MMOL/L (ref 136–145)
SODIUM SERPL-SCNC: 139 MMOL/L (ref 136–145)
WBC # BLD AUTO: 17.35 K/UL (ref 3.9–12.7)

## 2021-12-24 PROCEDURE — 99900035 HC TECH TIME PER 15 MIN (STAT)

## 2021-12-24 PROCEDURE — 25000242 PHARM REV CODE 250 ALT 637 W/ HCPCS: Performed by: PHYSICIAN ASSISTANT

## 2021-12-24 PROCEDURE — 25000003 PHARM REV CODE 250: Performed by: PSYCHIATRY & NEUROLOGY

## 2021-12-24 PROCEDURE — 99900026 HC AIRWAY MAINTENANCE (STAT)

## 2021-12-24 PROCEDURE — 25000003 PHARM REV CODE 250: Performed by: NURSE PRACTITIONER

## 2021-12-24 PROCEDURE — 63600175 PHARM REV CODE 636 W HCPCS: Performed by: PSYCHIATRY & NEUROLOGY

## 2021-12-24 PROCEDURE — 99291 CRITICAL CARE FIRST HOUR: CPT | Mod: ,,, | Performed by: PSYCHIATRY & NEUROLOGY

## 2021-12-24 PROCEDURE — 63600175 PHARM REV CODE 636 W HCPCS: Performed by: NURSE PRACTITIONER

## 2021-12-24 PROCEDURE — A4216 STERILE WATER/SALINE, 10 ML: HCPCS | Performed by: PSYCHIATRY & NEUROLOGY

## 2021-12-24 PROCEDURE — 84132 ASSAY OF SERUM POTASSIUM: CPT

## 2021-12-24 PROCEDURE — 84295 ASSAY OF SERUM SODIUM: CPT

## 2021-12-24 PROCEDURE — 94761 N-INVAS EAR/PLS OXIMETRY MLT: CPT

## 2021-12-24 PROCEDURE — 82800 BLOOD PH: CPT

## 2021-12-24 PROCEDURE — 82565 ASSAY OF CREATININE: CPT

## 2021-12-24 PROCEDURE — 27000221 HC OXYGEN, UP TO 24 HOURS

## 2021-12-24 PROCEDURE — 94799 UNLISTED PULMONARY SVC/PX: CPT

## 2021-12-24 PROCEDURE — 84100 ASSAY OF PHOSPHORUS: CPT | Performed by: PSYCHIATRY & NEUROLOGY

## 2021-12-24 PROCEDURE — 25000242 PHARM REV CODE 250 ALT 637 W/ HCPCS

## 2021-12-24 PROCEDURE — 25000242 PHARM REV CODE 250 ALT 637 W/ HCPCS: Performed by: PSYCHIATRY & NEUROLOGY

## 2021-12-24 PROCEDURE — 85014 HEMATOCRIT: CPT

## 2021-12-24 PROCEDURE — 83735 ASSAY OF MAGNESIUM: CPT | Performed by: PSYCHIATRY & NEUROLOGY

## 2021-12-24 PROCEDURE — 82803 BLOOD GASES ANY COMBINATION: CPT

## 2021-12-24 PROCEDURE — 20000000 HC ICU ROOM

## 2021-12-24 PROCEDURE — 37799 UNLISTED PX VASCULAR SURGERY: CPT

## 2021-12-24 PROCEDURE — 63600175 PHARM REV CODE 636 W HCPCS

## 2021-12-24 PROCEDURE — 99291 PR CRITICAL CARE, E/M 30-74 MINUTES: ICD-10-PCS | Mod: ,,, | Performed by: PSYCHIATRY & NEUROLOGY

## 2021-12-24 PROCEDURE — 85025 COMPLETE CBC W/AUTO DIFF WBC: CPT | Performed by: PSYCHIATRY & NEUROLOGY

## 2021-12-24 PROCEDURE — 27200966 HC CLOSED SUCTION SYSTEM

## 2021-12-24 PROCEDURE — 94640 AIRWAY INHALATION TREATMENT: CPT

## 2021-12-24 PROCEDURE — 80053 COMPREHEN METABOLIC PANEL: CPT | Performed by: PSYCHIATRY & NEUROLOGY

## 2021-12-24 PROCEDURE — C9254 INJECTION, LACOSAMIDE: HCPCS | Performed by: NURSE PRACTITIONER

## 2021-12-24 RX ORDER — ETOMIDATE 2 MG/ML
INJECTION INTRAVENOUS
Status: DISPENSED
Start: 2021-12-24 | End: 2021-12-24

## 2021-12-24 RX ORDER — TALC
6 POWDER (GRAM) TOPICAL NIGHTLY PRN
Status: DISCONTINUED | OUTPATIENT
Start: 2021-12-24 | End: 2021-12-25

## 2021-12-24 RX ORDER — ACETAMINOPHEN 325 MG/1
650 TABLET ORAL EVERY 6 HOURS PRN
Status: DISCONTINUED | OUTPATIENT
Start: 2021-12-24 | End: 2022-01-02

## 2021-12-24 RX ORDER — AMOXICILLIN 250 MG
1 CAPSULE ORAL DAILY
Status: DISCONTINUED | OUTPATIENT
Start: 2021-12-25 | End: 2022-01-04 | Stop reason: HOSPADM

## 2021-12-24 RX ORDER — AMLODIPINE BESYLATE 10 MG/1
10 TABLET ORAL DAILY
Status: DISCONTINUED | OUTPATIENT
Start: 2021-12-25 | End: 2022-01-04 | Stop reason: HOSPADM

## 2021-12-24 RX ORDER — SILODOSIN 4 MG/1
4 CAPSULE ORAL DAILY
Status: DISCONTINUED | OUTPATIENT
Start: 2021-12-24 | End: 2022-01-04 | Stop reason: HOSPADM

## 2021-12-24 RX ORDER — MONTELUKAST SODIUM 10 MG/1
10 TABLET ORAL DAILY
Status: DISCONTINUED | OUTPATIENT
Start: 2021-12-25 | End: 2022-01-04 | Stop reason: HOSPADM

## 2021-12-24 RX ORDER — MYCOPHENOLATE MOFETIL 200 MG/ML
500 POWDER, FOR SUSPENSION ORAL 2 TIMES DAILY
Status: DISCONTINUED | OUTPATIENT
Start: 2021-12-24 | End: 2022-01-04 | Stop reason: HOSPADM

## 2021-12-24 RX ORDER — FENTANYL CITRATE 50 UG/ML
INJECTION, SOLUTION INTRAMUSCULAR; INTRAVENOUS
Status: DISPENSED
Start: 2021-12-24 | End: 2021-12-24

## 2021-12-24 RX ORDER — ATORVASTATIN CALCIUM 20 MG/1
40 TABLET, FILM COATED ORAL DAILY
Status: DISCONTINUED | OUTPATIENT
Start: 2021-12-25 | End: 2022-01-04 | Stop reason: HOSPADM

## 2021-12-24 RX ORDER — BISACODYL 10 MG
10 SUPPOSITORY, RECTAL RECTAL DAILY PRN
Status: DISCONTINUED | OUTPATIENT
Start: 2021-12-24 | End: 2022-01-04 | Stop reason: HOSPADM

## 2021-12-24 RX ORDER — ROCURONIUM BROMIDE 10 MG/ML
INJECTION, SOLUTION INTRAVENOUS
Status: DISPENSED
Start: 2021-12-24 | End: 2021-12-24

## 2021-12-24 RX ORDER — HYDROXYCHLOROQUINE SULFATE 200 MG/1
200 TABLET, FILM COATED ORAL 2 TIMES DAILY
Status: DISCONTINUED | OUTPATIENT
Start: 2021-12-24 | End: 2022-01-01

## 2021-12-24 RX ADMIN — IPRATROPIUM BROMIDE AND ALBUTEROL SULFATE 3 ML: 2.5; .5 SOLUTION RESPIRATORY (INHALATION) at 09:12

## 2021-12-24 RX ADMIN — HYDROXYCHLOROQUINE SULFATE 200 MG: 200 TABLET, FILM COATED ORAL at 09:12

## 2021-12-24 RX ADMIN — ACETYLCYSTEINE 4 ML: 100 INHALANT RESPIRATORY (INHALATION) at 12:12

## 2021-12-24 RX ADMIN — RACEPINEPHRINE HYDROCHLORIDE 0.5 ML: 11.25 SOLUTION RESPIRATORY (INHALATION) at 09:12

## 2021-12-24 RX ADMIN — DEXAMETHASONE SODIUM PHOSPHATE 4 MG: 4 INJECTION INTRA-ARTICULAR; INTRALESIONAL; INTRAMUSCULAR; INTRAVENOUS; SOFT TISSUE at 11:12

## 2021-12-24 RX ADMIN — MYCOPHENOLATE MOFETIL 500 MG: 200 POWDER, FOR SUSPENSION ORAL at 09:12

## 2021-12-24 RX ADMIN — ACETYLCYSTEINE 4 ML: 100 INHALANT RESPIRATORY (INHALATION) at 03:12

## 2021-12-24 RX ADMIN — Medication 10 ML: at 06:12

## 2021-12-24 RX ADMIN — METHYLPREDNISOLONE SODIUM SUCCINATE 20 MG: 40 INJECTION, POWDER, FOR SOLUTION INTRAMUSCULAR; INTRAVENOUS at 08:12

## 2021-12-24 RX ADMIN — Medication 10 ML: at 12:12

## 2021-12-24 RX ADMIN — SODIUM CHLORIDE 30 MG/ML INHALATION SOLUTION 4 ML: 30 SOLUTION INHALANT at 08:12

## 2021-12-24 RX ADMIN — SENNOSIDES AND DOCUSATE SODIUM 1 TABLET: 50; 8.6 TABLET ORAL at 08:12

## 2021-12-24 RX ADMIN — IPRATROPIUM BROMIDE AND ALBUTEROL SULFATE 3 ML: 2.5; .5 SOLUTION RESPIRATORY (INHALATION) at 08:12

## 2021-12-24 RX ADMIN — IPRATROPIUM BROMIDE AND ALBUTEROL SULFATE 3 ML: 2.5; .5 SOLUTION RESPIRATORY (INHALATION) at 03:12

## 2021-12-24 RX ADMIN — HYDROXYCHLOROQUINE SULFATE 200 MG: 200 TABLET, FILM COATED ORAL at 12:12

## 2021-12-24 RX ADMIN — SODIUM CHLORIDE 100 MG: 9 INJECTION, SOLUTION INTRAVENOUS at 09:12

## 2021-12-24 RX ADMIN — SODIUM CHLORIDE 100 MG: 9 INJECTION, SOLUTION INTRAVENOUS at 08:12

## 2021-12-24 RX ADMIN — HEPARIN SODIUM 5000 UNITS: 5000 INJECTION INTRAVENOUS; SUBCUTANEOUS at 05:12

## 2021-12-24 RX ADMIN — MONTELUKAST 10 MG: 10 TABLET, FILM COATED ORAL at 08:12

## 2021-12-24 RX ADMIN — SODIUM CHLORIDE 30 MG/ML INHALATION SOLUTION 4 ML: 30 SOLUTION INHALANT at 03:12

## 2021-12-24 RX ADMIN — MYCOPHENOLATE MOFETIL 500 MG: 200 POWDER, FOR SUSPENSION ORAL at 12:12

## 2021-12-24 RX ADMIN — Medication 6 MG: at 10:12

## 2021-12-24 RX ADMIN — Medication 10 ML: at 05:12

## 2021-12-24 RX ADMIN — CEFEPIME 1 G: 1 INJECTION, POWDER, FOR SOLUTION INTRAMUSCULAR; INTRAVENOUS at 06:12

## 2021-12-24 RX ADMIN — DEXAMETHASONE SODIUM PHOSPHATE 4 MG: 4 INJECTION INTRA-ARTICULAR; INTRALESIONAL; INTRAMUSCULAR; INTRAVENOUS; SOFT TISSUE at 02:12

## 2021-12-24 RX ADMIN — HEPARIN SODIUM 5000 UNITS: 5000 INJECTION INTRAVENOUS; SUBCUTANEOUS at 01:12

## 2021-12-24 RX ADMIN — Medication 10 ML: at 11:12

## 2021-12-24 RX ADMIN — IPRATROPIUM BROMIDE AND ALBUTEROL SULFATE 3 ML: 2.5; .5 SOLUTION RESPIRATORY (INHALATION) at 12:12

## 2021-12-24 RX ADMIN — ACETYLCYSTEINE 4 ML: 100 INHALANT RESPIRATORY (INHALATION) at 08:12

## 2021-12-24 RX ADMIN — HYDROXYCHLOROQUINE SULFATE 200 MG: 200 TABLET, FILM COATED ORAL at 08:12

## 2021-12-24 RX ADMIN — ATORVASTATIN CALCIUM 40 MG: 20 TABLET, FILM COATED ORAL at 08:12

## 2021-12-24 RX ADMIN — SODIUM CHLORIDE 30 MG/ML INHALATION SOLUTION 4 ML: 30 SOLUTION INHALANT at 12:12

## 2021-12-24 RX ADMIN — HEPARIN SODIUM 5000 UNITS: 5000 INJECTION INTRAVENOUS; SUBCUTANEOUS at 09:12

## 2021-12-24 NOTE — NURSING
Pt extubated without any complications by NCC team and RT, O2 Sat 100% and VSS. 6L NC with humidification applied. Breathing treatment being administered at this time. Will continue to monitor very closely.

## 2021-12-24 NOTE — PLAN OF CARE
[facility] Care Plan    POC reviewed with Selma Lux and daughter at 0300. Pt's  verbalized understanding. Questions and concerns addressed. Pt was more agitated tonight, Precedex gtt at 0.6 mcg/kg/hr to calm her down. Until 2350, she got relax ,  became hypotensive. Titrated precedex gtt down, notified provider , see other note for more detail. Titrated precedex gtt to maintain the MAP >65. Placed OGT .   Pt progressing toward goals. Will continue to monitor. See below and flowsheets for full assessment and VS info.       Neuro:  [place] Coma Scale  Best Eye Response: 3-->(E3) to speech  Best Motor Response: 6-->(M6) obeys commands  Best Verbal Response: 1-->(V1) none  [place] Coma Scale Score: 10  Assessment Qualifiers: patient intubated  Pupil PERRLA: yes     24hr Temp:  [97.8 °F (36.6 °C)-98.9 °F (37.2 °C)]     CV:   Rhythm: atrial rhythm  BP goals:   SBP < 160  MAP > 65    Resp:   O2 Device (Oxygen Therapy): ventilator  Vent Mode: A/C  Set Rate: 8 BPM  Oxygen Concentration (%): 30  Vt Set: 450 mL  PEEP/CPAP: 5 cmH20  Pressure Support: 10 cmH20    Plan: wean to extubate    GI/:  SCOOBY Total Score: 0  Diet/Nutrition Received: NPO  Last Bowel Movement: 12/21/21  Voiding Characteristics: external catheter    Intake/Output Summary (Last 24 hours) at 12/24/2021 0802  Last data filed at 12/24/2021 0705  Gross per 24 hour   Intake 1600.51 ml   Output 1050 ml   Net 550.51 ml     Unmeasured Output  Urine Occurrence: 1  Stool Occurrence: 1  Pad Count: 1    Labs/Accuchecks:  Recent Labs   Lab 12/21/21  0304 12/24/21  0245 12/24/21  0332   WBC  --  17.35*  --    RBC  --  3.10*  --    HGB  --  8.8*  --    HCT   < > 29.0* 27*   PLT  --  96*  --     < > = values in this interval not displayed.      Recent Labs   Lab 12/24/21  0245      K 4.4   CO2 24      BUN 35*   CREATININE 0.7   ALKPHOS 53*   ALT 18   AST 16   BILITOT 1.2*      Recent Labs   Lab 12/21/21  0255   INR 1.1   APTT 26.2    No results for input(s): CPK,  CPKMB, TROPONINI, MB in the last 168 hours.    Electrolytes: N/A - electrolytes WDL  Accuchecks: Q4H    Gtts:   dexmedetomidine (PRECEDEX) infusion 0.4 mcg/kg/hr (12/24/21 0705)       LDA/Wounds:  Lines/Drains/Airways       Peripherally Inserted Central Catheter Line              PICC Double Lumen 12/15/21 1616 right basilic 8 days              Drain                   NG/OG Tube 12/23/21 2300 18 Fr. Right mouth <1 day              Airway                   Airway - Non-Surgical 12/21/21 0239 Endotracheal Tube 3 days       Airway Anesthesia 12/21/21 3 days              Arterial Line              Arterial Line 12/21/21 0303 Left Brachial 3 days                  Wounds: No  Wound care consulted: No

## 2021-12-24 NOTE — PROGRESS NOTES
[facility]  Neurosurgery  Progress Note    Subjective:     History of Present Illness: 86-year-old female with past medical history of TIA comes to the emergency department for AMS.  She was found on the floor about 1 hour ago only responding to painful stimuli.  Patient occasionally says her name.GCS 12 noted for EMS.  Patient does take blood thinners. She had a mechanical fall out of bed on Thursday hitting her head and has been complaining of intermittent headaches since. Her last dose of eliquis was on 12/11. NSGY consulted for CTH with SDH      Post-Op Info:  * No surgery found *         Interval History:  No acute events overnight neuro exam stable.  Patient remains vented. No cuff leak. Started on dex. Neuro stable    Medications:  Continuous Infusions:    Scheduled Meds:   etomidate        fentaNYL        rocuronium        acetylcysteine 100 mg/ml (10%)  4 mL Nebulization QID WAKE    albuterol-ipratropium  3 mL Nebulization Q4H    amLODIPine  10 mg Per NG tube Daily    atorvastatin  40 mg Per NG tube Daily    ceFEPime (MAXIPIME) IVPB  1 g Intravenous Q12H    heparin (porcine)  5,000 Units Subcutaneous Q8H    hydrOXYchloroQUINE  200 mg Per NG tube BID    lacosamide (VIMPAT) IVPB  100 mg Intravenous Q12H    methylPREDNISolone sodium succinate injection  20 mg Intravenous Daily    montelukast  10 mg Per NG tube Daily    mycophenolate mofetil  500 mg Per NG tube BID    racepinephrine  0.5 mL Nebulization Once    senna-docusate 8.6-50 mg  1 tablet Per NG tube Daily    silodosin  4 mg Oral Daily    sodium chloride 0.9%  10 mL Intravenous Q6H    sodium chloride 3%  4 mL Nebulization Q4H     PRN Meds:acetaminophen, albuterol-ipratropium, bisacodyL, calcium gluconate IVPB, calcium gluconate IVPB, calcium gluconate IVPB, dextrose 50%, glucagon (human recombinant), insulin aspart U-100, labetalol, magnesium sulfate IVPB, magnesium sulfate IVPB, ondansetron, potassium chloride in  water **AND** potassium chloride in water **AND** potassium chloride in water, racepinephrine, Flushing PICC Protocol **AND** sodium chloride 0.9% **AND** sodium chloride 0.9%     Review of Systems   Reason unable to perform ROS: intubated.     Objective:     Weight: 67 kg (147 lb 11.3 oz)  Body mass index is 25.35 kg/m².  Vital Signs (Most Recent):  Temp: 98 °F (36.7 °C) (12/24/21 1105)  Pulse: 96 (12/24/21 1105)  Resp: (!) 23 (12/24/21 1105)  BP: (!) 148/58 (12/24/21 1105)  SpO2: 100 % (12/24/21 1105) Vital Signs (24h Range):  Temp:  [97.8 °F (36.6 °C)-98.9 °F (37.2 °C)] 98 °F (36.7 °C)  Pulse:  [] 96  Resp:  [0-113] 23  SpO2:  [98 %-100 %] 100 %  BP: (100-176)/(45-91) 148/58  Arterial Line BP: ()/(41-72) 131/53     Date 12/24/21 0700 - 12/25/21 0659   Shift 9355-2854 0824-6594 7021-1915 24 Hour Total   INTAKE   I.V.(mL/kg) 6.3(0.1)   6.3(0.1)   IV Piggyback 90.7   90.7   Shift Total(mL/kg) 97(1.4)   97(1.4)   OUTPUT   Shift Total(mL/kg)       Weight (kg) 67 67 67 67              Vent Mode: Spont  Oxygen Concentration (%):  [30-40] 30  Resp Rate Total:  [14 br/min-29 br/min] 16 br/min  Vt Set:  [450 mL] 450 mL  PEEP/CPAP:  [5 cmH20] 5 cmH20  Pressure Support:  [10 cmH20] 10 cmH20  Mean Airway Pressure:  [8.4 cmH20-10 cmH20] 10 cmH20         NG/OG Tube 12/17/21 1405 Left nostril (Active)   Placement Check placement verified by aspirate characteristics 12/22/21 0305   Tolerance no signs/symptoms of discomfort 12/22/21 0305   Securement secured to nostril center 12/22/21 0305   Clamp Status/Tolerance no abdominal distention;no abdominal discomfort;no emesis;no nausea;no residual;no restlessness 12/22/21 0305   Suction Setting/Drainage Method suction at the bedside 12/22/21 0305   Insertion Site Appearance no redness, warmth, tenderness, skin breakdown, drainage 12/22/21 0305   Flush/Irrigation flushed w/;water;no resistance met 12/22/21 0305   Feeding Type continuous;by pump 12/22/21 0305   Feeding Action  "feeding continued 12/22/21 0305   Current Rate (mL/hr) 10 mL/hr 12/21/21 1905   Goal Rate (mL/hr) 10 mL/hr 12/21/21 1905   Intake (mL) 100 mL 12/20/21 0800   Water Bolus (mL) 200 mL 12/21/21 1600   Rate Formula Tube Feeding (mL/hr) 10 mL/hr 12/21/21 1905   Formula Name Isosource 12/22/21 0305   Intake (mL) - Formula Tube Feeding 20 12/21/21 1800   Residual Amount (ml) 0 ml 12/21/21 1905            Urethral Catheter 12/21/21 0300 Temperature probe 16 Fr. (Active)   Site Assessment Clean;Intact 12/22/21 0305   Collection Container Urimeter 12/22/21 0305   Securement Method secured to top of thigh w/ adhesive device 12/22/21 0305   Catheter Care Performed yes 12/22/21 0305   Reason for Continuing Urinary Catheterization Critically ill in ICU and requiring hourly monitoring of intake/output 12/22/21 0305   CAUTI Prevention Bundle StatLock in place w 1" slack;Intact seal between catheter & drainage tubing;Drainage bag/urimeter off the floor;Sheeting clip in use;No dependent loops or kinks;Drainage bag/urimeter not overfilled (<2/3 full);Drainage bag/urimeter below bladder 12/21/21 1905   Output (mL) 20 mL 12/22/21 0700       Physical Exam:  E4VTM6  PERRL  EOMI  Face Symmetric  FCx4        Significant Labs:  Recent Labs   Lab 12/23/21  0341 12/24/21  0245   * 126*    139   K 4.0 4.4    109   CO2 22* 24   BUN 45* 35*   CREATININE 0.9 0.7   CALCIUM 8.0* 8.1*   MG 2.1 2.0     Recent Labs   Lab 12/23/21  0341 12/24/21  0245 12/24/21  0332   WBC 27.22* 17.35*  --    HGB 9.2* 8.8*  --    HCT 30.6* 29.0* 27*   PLT 99* 96*  --      No results for input(s): LABPT, INR, APTT in the last 48 hours.  Microbiology Results (last 7 days)     Procedure Component Value Units Date/Time    Culture, Respiratory with Gram Stain [[id number]]  (Abnormal)  (Susceptibility) Collected: 12/21/21 1015    Order Status: Completed Specimen: Respiratory from Tracheal Aspirate Updated: 12/24/21 1001     Respiratory Culture No S aureus or " Pseudomonas isolated.      ESCHERICHIA COLI  Few       Gram Stain (Respiratory) <10 epithelial cells per low power field.     Gram Stain (Respiratory) Many WBC's     Gram Stain (Respiratory) Few Gram positive cocci    Blood culture [[id number]] Collected: 12/21/21 1032    Order Status: Completed Specimen: Blood from Peripheral, Antecubital, Right Updated: 12/23/21 1212     Blood Culture, Routine No Growth to date      No Growth to date      No Growth to date    Narrative:      Blood cultures x 2 different sites. 4 bottles total. Please  draw cultures before administering antibiotics.    Blood culture [[id number]] Collected: 12/21/21 1010    Order Status: Completed Specimen: Blood from Peripheral, Forearm, Right Updated: 12/23/21 1212     Blood Culture, Routine No Growth to date      No Growth to date      No Growth to date    Narrative:      Blood cultures from 2 different sites. 4 bottles total.  Please draw before starting antibiotics.        All pertinent labs from the last 24 hours have been reviewed.    Significant Diagnostics:  I have reviewed and interpreted all pertinent imaging results/findings within the past 24 hours.    Assessment/Plan:     * SDH (subdural hematoma)  An 86F with on eliquis s/p head trauma 1 week prior now with small aSDH and tSAH and AMS c/b PEA arrest 12/20    Continue ICU care   Neurocheck per protocol   Work up:  --CTH: R temp/paretial aSDH 5-6mm thickness, 3-5mm MLS, stable on repeat as well as imaging post PEA arrest  --Follow up CT stable  Follow up head CT on Monday  Continue holding eliquis  WTE per NCC. No cuff leak. On steroid   cEEG, no seizrues 12/22  AED per Epilepsy  SBP<150  Na >135  Please page NSGY immediately for any changes in neuro status          Neftaly Wilkerson MD  Neurosurgery  [facility]

## 2021-12-24 NOTE — PROGRESS NOTES
[facility] - Neuro Critical Care  Neurocritical Care  Progress Note    Admit Date: 12/13/2021  Service Date: 12/24/2021  Length of Stay: 11    Subjective:     Chief Complaint: SDH (subdural hematoma)    History of Present Illness:   The patient is an 86-year-old female with past medical history of TIA, RA and Afib of Eliquis admitted to [facility] with Right SDH.  She was found on the floor this morning only responding to painful stimuli.  Patient occasionally says her name.GCS 12 noted for EMS.  PCC administered. She had a mechanical fall out of bed on Thursday hitting her head and has been complaining of intermittent headaches since. Her last dose of eliquis was on 12/11. Pending repeat CT head. NSGY consulted. In addition patient was found to be hyponatremic at 118-central line placed and hypertonic saline infusion initiated. Pending repeat CT head. Patient admitted to [facility] for close monitoring and higher level of care.       Hospital Course: 12/13/2021: patient admitted to [facility] s/p fall on 12/11 on Eliquis  12/14/2021 wheezing, prolonged expiratory phase.   12/15/2021 copious respiratory secretions. Remains encephalopathic. Review EEG  12/16/2021 improved respiratory secretions. D/c nasal tracheal airway if minimal secretions.   12/17/2021 restless.   12/18/2021 lethargic, rising BUN/Cr ratio. Fluid resuscitation. Received flumazenil for BDZ   12/19/2021 encephalopathy overnight. Work up neg so far. BUN/Cr >20 from volume contraction.   12/20/2021 left facial twitching overnight. Loaded with vimpat.   12/21/2021 event overnight cardiac arrest/PEA likely related to respiratory distress. Intubated.   12/22/2021 Add precedex to enable vent weaning.  12/23/2021   extubation trial today  12/24/2021 extubated over cook catheter due to concern for laryngeal edema. Successfully extubated this am. Close monitoring.       Review of Symptoms: encephalopathic cannot participate    Medications:  Continuous Infusions:    Scheduled  Meds:   etomidate        fentaNYL        rocuronium        acetylcysteine 100 mg/ml (10%)  4 mL Nebulization QID WAKE    albuterol-ipratropium  3 mL Nebulization Q4H    amLODIPine  10 mg Per NG tube Daily    atorvastatin  40 mg Per NG tube Daily    ceFEPime (MAXIPIME) IVPB  1 g Intravenous Q12H    dexamethasone  4 mg Intravenous Q8H    heparin (porcine)  5,000 Units Subcutaneous Q8H    hydrOXYchloroQUINE  200 mg Per NG tube BID    lacosamide (VIMPAT) IVPB  100 mg Intravenous Q12H    methylPREDNISolone sodium succinate injection  20 mg Intravenous Daily    montelukast  10 mg Per NG tube Daily    mycophenolate mofetil  500 mg Per NG tube BID    racepinephrine  0.5 mL Nebulization Once    senna-docusate 8.6-50 mg  1 tablet Per NG tube Daily    sodium chloride 0.9%  10 mL Intravenous Q6H    sodium chloride 3%  4 mL Nebulization Q4H     PRN Meds:.acetaminophen, albuterol-ipratropium, calcium gluconate IVPB, calcium gluconate IVPB, calcium gluconate IVPB, dextrose 50%, fentaNYL, glucagon (human recombinant), insulin aspart U-100, labetalol, magnesium sulfate IVPB, magnesium sulfate IVPB, ondansetron, potassium chloride in water **AND** potassium chloride in water **AND** potassium chloride in water, racepinephrine, Flushing PICC Protocol **AND** sodium chloride 0.9% **AND** sodium chloride 0.9%    OBJECTIVE:   Vital Signs (Most Recent):   Temp: 98.2 °F (36.8 °C) (12/24/21 0705)  Pulse: 99 (12/24/21 0903)  Resp: (!) 21 (12/24/21 0914)  BP: (!) 176/69 (12/24/21 0903)  SpO2: 100 % (12/24/21 0903)    Vital Signs (24h Range):   Temp:  [97.8 °F (36.6 °C)-98.9 °F (37.2 °C)] 98.2 °F (36.8 °C)  Pulse:  [] 99  Resp:  [9-113] 21  SpO2:  [98 %-100 %] 100 %  BP: (100-176)/(45-91) 176/69  Arterial Line BP: ()/(41-72) 161/72    ICP/CPP (Last 24h):        I & O (Last 24h):     Intake/Output Summary (Last 24 hours) at 12/24/2021 0936  Last data filed at 12/24/2021 0705  Gross per 24 hour   Intake 1593.81 ml    Output 1050 ml   Net 543.81 ml     Physical Exam: unchanged from previous day  GA: awake, comfortable, no acute distress.   HEENT: No scleral icterus or JVD. Neck supple  Pulmonary: Air entry equal to auscultation A/P/L. Wheezing no, crackles no  Cardiac: RRR, S1 & S2 w/o rubs/murmurs/gallops.   Abdominal: soft, non-tender, bowel sounds present x 4. No appreciable hepatosplenomegaly.  Skin: No jaundice, rashes, or visible lesions.  Neuro:  --Mental Status:  Sedated but easily arousable, follows one step commands. Able to show two fingers Spontaneous eye opening.   --Pupils 3.5 mm, PERRL.   --Corneal reflex not done in this arousable patient, gag, cough intact.  Decreased movement (improved) on the left UE and LE  MSK:  No edema in UE and LE    Vent Data:   Vent Mode: Spont  Oxygen Concentration (%):  [30-40] 30  Resp Rate Total:  [14 br/min-29 br/min] 16 br/min  Vt Set:  [450 mL] 450 mL  PEEP/CPAP:  [5 cmH20] 5 cmH20  Pressure Support:  [10 cmH20] 10 cmH20  Mean Airway Pressure:  [8.4 cmH20-10 cmH20] 10 cmH20    Lines/Drains/Airway:        Airway - Non-Surgical 12/21/21 0239 Endotracheal Tube (Active)   Secured at 24 cm 12/24/21 0801   Measured At Lips 12/24/21 0801   Secured Location Center 12/24/21 0801   Secured by Commercial tube fajardo 12/24/21 0801   Bite Block center;secure and patent 12/24/21 0801   Site Condition Dry 12/24/21 0801   Status Intact;Secured 12/24/21 0801   Site Assessment Clean 12/24/21 0801   Cuff Pressure 30 cm H2O 12/24/21 0303          Airway Anesthesia 12/21/21 (Active)      PICC Double Lumen 12/15/21 1616 right basilic (Active)   Verification by X-ray Yes 12/24/21 0705   Site Assessment No drainage;No redness;No swelling;No warmth 12/24/21 0705   Extremity Assessment Distal to IV No abnormal discoloration;No redness;No swelling;No warmth 12/24/21 0705   Line Securement Device Secured with sutureless device 12/24/21 0705   Dressing Type Biopatch in place 12/24/21 0705   Dressing Status  Clean;Dry;Intact 12/24/21 0705   Dressing Intervention Integrity maintained 12/24/21 0705   Date on Dressing 12/22/21 12/24/21 0705   Dressing Due to be Changed 12/29/21 12/24/21 0705   Left Lumen Patency/Care Infusing 12/24/21 0705   Right Lumen Patency/Care Infusing 12/24/21 0705   Current Insertion Depth (cm) 33 cm 12/15/21 1616   Current Exposed Catheter (cm) 0 cm 12/15/21 1616   Extremity Circumference (cm) 33 cm 12/15/21 1616   Waveform Normal 12/24/21 0705   Line Necessity Review Poor venous access 12/24/21 0705      Arterial Line 12/21/21 0303 Left Brachial (Active)   Site Assessment Clean;Dry;Intact 12/24/21 0705   Line Status Pulsatile blood flow 12/24/21 0705   Art Line Waveform Appropriate;Square wave test performed 12/24/21 0705   Arterial Line Interventions Zeroed and calibrated 12/24/21 0705   Color/Movement/Sensation Capillary refill less than 3 sec 12/24/21 0705   Dressing Type Biopatch in place 12/24/21 0705   Dressing Status Clean;Dry;Intact 12/24/21 0705   Dressing Intervention Integrity maintained 12/24/21 0705   Dressing Change Due 12/28/21 12/24/21 0705   Tubing Change Due 12/27/21 12/24/21 0705           NG/OG Tube 12/23/21 2300 18 Fr. Right mouth (Active)   Placement Check placement verified by x-ray 12/24/21 0705   Tolerance no signs/symptoms of discomfort 12/24/21 0705   Securement secured to commercial device 12/24/21 0705   Clamp Status/Tolerance clamped 12/24/21 0705   Insertion Site Appearance no redness, warmth, tenderness, skin breakdown, drainage 12/24/21 0705   Water Bolus (mL) 500 mL 12/24/21 0005       Nutrition/Tube Feeds (if NPO state why): tf    Labs:  ABG:   Recent Labs   Lab 12/24/21  0332   PH 7.462*   PO2 130*   PCO2 33.0*   HCO3 23.6*   POCSATURATED 99   BE 0     BMP:  Recent Labs   Lab 12/24/21  0245      K 4.4      CO2 24   BUN 35*   CREATININE 0.7   *   MG 2.0   PHOS 2.8     LFT:   Lab Results   Component Value Date    AST 16 12/24/2021    ALT 18  12/24/2021    ALKPHOS 53 (L) 12/24/2021    BILITOT 1.2 (H) 12/24/2021    ALBUMIN 2.7 (L) 12/24/2021    PROT 4.5 (L) 12/24/2021     CBC:   Lab Results   Component Value Date    WBC 17.35 (H) 12/24/2021    HGB 8.8 (L) 12/24/2021    HCT 27 (L) 12/24/2021    MCV 94 12/24/2021    PLT 96 (L) 12/24/2021     Microbiology x 7d:   Microbiology Results (last 7 days)     Procedure Component Value Units Date/Time    Blood culture [[id number]] Collected: 12/21/21 1032    Order Status: Completed Specimen: Blood from Peripheral, Antecubital, Right Updated: 12/23/21 1212     Blood Culture, Routine No Growth to date      No Growth to date      No Growth to date    Narrative:      Blood cultures x 2 different sites. 4 bottles total. Please  draw cultures before administering antibiotics.    Blood culture [[id number]] Collected: 12/21/21 1010    Order Status: Completed Specimen: Blood from Peripheral, Forearm, Right Updated: 12/23/21 1212     Blood Culture, Routine No Growth to date      No Growth to date      No Growth to date    Narrative:      Blood cultures from 2 different sites. 4 bottles total.  Please draw before starting antibiotics.    Culture, Respiratory with Gram Stain [[id number]]  (Abnormal) Collected: 12/21/21 1015    Order Status: Completed Specimen: Respiratory from Tracheal Aspirate Updated: 12/23/21 1048     Respiratory Culture GRAM NEGATIVE CARLOS  Few  Identification and susceptibility pending       Gram Stain (Respiratory) <10 epithelial cells per low power field.     Gram Stain (Respiratory) Many WBC's     Gram Stain (Respiratory) Few Gram positive cocci        Imaging:  CXR 12/23 -lung fields clear. Lines in place. Widened mediastinum (POA)  I personally reviewed the above image.  Today I independently reviewed pertinent medications, lines/drains/airways, imaging, cardiology results, laboratory results, microbiology results, notably:   ASSESSMENT/PLAN:     Active Hospital Problems    Diagnosis    *SDH (subdural  hematoma)    Cardiac arrest    Encephalopathy    Twitching    Stroke    Hyponatremia    Age-related physical debility    Leukocytosis    RA (rheumatoid arthritis)      Neuro:   SDH with brain compression  Acute encephalopathy likely multifactorial  Repeat CT head 12/18 - crowding and mass effect of right hemisphere from overlying SDH.   MRI brain 12/19 - demonstrated SDH with mass effect. No large territory ischemia to explain neurological decline  Loaded with vimpat for left facial twitching on 12/19.     Pulmonary:   Solumedrol (conversion from home regimen of prednisone)  Duonebs/3% nacl/mucomyst/Chest PT  Successfully extubated - received racemic epi. Will repeat x1       Cardiac:   SBP goal <160mmhg  EF 65%  Post cardiac arrest/PEA likely respiratory due to secretions on 12/20-21 night- full recovery    Renal:    BUN/Cr >20   Making urine  silodosin for urinary retention      ID:   Afebrile, leucocytosis - likely steroid effect  Panculture- gram neg bunny in sputum  Continue cefepime  Check procalcitonin -0.59>>0.67    Hem/Onc:   Transfuse for Hb <7 or 8 if associated with hypotension  plt count slight down trend but stable    Endocrine:    Hyperglycemia - start sliding scale if BS>200 schedule insulin aspart 2units q4hrly  HbA1c 5.3%     Fluids/Electrolytes/Nutrition/GI:   Replete lytes as needed  Tf - advance to goal  Lines:  Art +  CVC + PICC  ETT NA  Hill NA. Bladder scan twice a shift  NG  NA  PEG NA    Proph:  DVT:SCD/heparin  Constipation:  Last output:bowel regimen as needed  PUP: pepcid  VAP: peridex    Family updated at bedside    Uninterrupted Critical Care/Counseling Time (not including procedures):: 30 mins    Juan Irwin MD, FNCS  Neurocritical care attending    Full Code    Juan Irwin MD  Neurocritical Care  [facility] - Neuro Critical Care

## 2021-12-24 NOTE — PROGRESS NOTES
Notified KAMLA Wen about hypotensive, to start the Levo. Gtt. She came to bedside. Ordered to give bolus of 500 mL of Normal saline. Then turn off Levo.

## 2021-12-24 NOTE — SUBJECTIVE & OBJECTIVE
Interval History:  No acute events overnight neuro exam stable.  Patient remains vented. No cuff leak. Started on dex. Neuro stable    Medications:  Continuous Infusions:    Scheduled Meds:   etomidate        fentaNYL        rocuronium        acetylcysteine 100 mg/ml (10%)  4 mL Nebulization QID WAKE    albuterol-ipratropium  3 mL Nebulization Q4H    amLODIPine  10 mg Per NG tube Daily    atorvastatin  40 mg Per NG tube Daily    ceFEPime (MAXIPIME) IVPB  1 g Intravenous Q12H    heparin (porcine)  5,000 Units Subcutaneous Q8H    hydrOXYchloroQUINE  200 mg Per NG tube BID    lacosamide (VIMPAT) IVPB  100 mg Intravenous Q12H    methylPREDNISolone sodium succinate injection  20 mg Intravenous Daily    montelukast  10 mg Per NG tube Daily    mycophenolate mofetil  500 mg Per NG tube BID    racepinephrine  0.5 mL Nebulization Once    senna-docusate 8.6-50 mg  1 tablet Per NG tube Daily    silodosin  4 mg Oral Daily    sodium chloride 0.9%  10 mL Intravenous Q6H    sodium chloride 3%  4 mL Nebulization Q4H     PRN Meds:acetaminophen, albuterol-ipratropium, bisacodyL, calcium gluconate IVPB, calcium gluconate IVPB, calcium gluconate IVPB, dextrose 50%, glucagon (human recombinant), insulin aspart U-100, labetalol, magnesium sulfate IVPB, magnesium sulfate IVPB, ondansetron, potassium chloride in water **AND** potassium chloride in water **AND** potassium chloride in water, racepinephrine, Flushing PICC Protocol **AND** sodium chloride 0.9% **AND** sodium chloride 0.9%     Review of Systems   Reason unable to perform ROS: intubated.     Objective:     Weight: 67 kg (147 lb 11.3 oz)  Body mass index is 25.35 kg/m².  Vital Signs (Most Recent):  Temp: 98 °F (36.7 °C) (12/24/21 1105)  Pulse: 96 (12/24/21 1105)  Resp: (!) 23 (12/24/21 1105)  BP: (!) 148/58 (12/24/21 1105)  SpO2: 100 % (12/24/21 1105) Vital Signs (24h Range):  Temp:  [97.8 °F (36.6 °C)-98.9 °F (37.2 °C)] 98 °F (36.7 °C)  Pulse:  []  96  Resp:  [0-113] 23  SpO2:  [98 %-100 %] 100 %  BP: (100-176)/(45-91) 148/58  Arterial Line BP: ()/(41-72) 131/53     Date 12/24/21 0700 - 12/25/21 0659   Shift 5065-0238 8956-7682 0441-8354 24 Hour Total   INTAKE   I.V.(mL/kg) 6.3(0.1)   6.3(0.1)   IV Piggyback 90.7   90.7   Shift Total(mL/kg) 97(1.4)   97(1.4)   OUTPUT   Shift Total(mL/kg)       Weight (kg) 67 67 67 67              Vent Mode: Spont  Oxygen Concentration (%):  [30-40] 30  Resp Rate Total:  [14 br/min-29 br/min] 16 br/min  Vt Set:  [450 mL] 450 mL  PEEP/CPAP:  [5 cmH20] 5 cmH20  Pressure Support:  [10 cmH20] 10 cmH20  Mean Airway Pressure:  [8.4 cmH20-10 cmH20] 10 cmH20         NG/OG Tube 12/17/21 1405 Left nostril (Active)   Placement Check placement verified by aspirate characteristics 12/22/21 0305   Tolerance no signs/symptoms of discomfort 12/22/21 0305   Securement secured to nostril center 12/22/21 0305   Clamp Status/Tolerance no abdominal distention;no abdominal discomfort;no emesis;no nausea;no residual;no restlessness 12/22/21 0305   Suction Setting/Drainage Method suction at the bedside 12/22/21 0305   Insertion Site Appearance no redness, warmth, tenderness, skin breakdown, drainage 12/22/21 0305   Flush/Irrigation flushed w/;water;no resistance met 12/22/21 0305   Feeding Type continuous;by pump 12/22/21 0305   Feeding Action feeding continued 12/22/21 0305   Current Rate (mL/hr) 10 mL/hr 12/21/21 1905   Goal Rate (mL/hr) 10 mL/hr 12/21/21 1905   Intake (mL) 100 mL 12/20/21 0800   Water Bolus (mL) 200 mL 12/21/21 1600   Rate Formula Tube Feeding (mL/hr) 10 mL/hr 12/21/21 1905   Formula Name Isosource 12/22/21 0305   Intake (mL) - Formula Tube Feeding 20 12/21/21 1800   Residual Amount (ml) 0 ml 12/21/21 1905            Urethral Catheter 12/21/21 0300 Temperature probe 16 Fr. (Active)   Site Assessment Clean;Intact 12/22/21 0305   Collection Container Urimeter 12/22/21 0305   Securement Method secured to top of thigh w/ adhesive  "device 12/22/21 0305   Catheter Care Performed yes 12/22/21 0305   Reason for Continuing Urinary Catheterization Critically ill in ICU and requiring hourly monitoring of intake/output 12/22/21 0305   CAUTI Prevention Bundle StatLock in place w 1" slack;Intact seal between catheter & drainage tubing;Drainage bag/urimeter off the floor;Sheeting clip in use;No dependent loops or kinks;Drainage bag/urimeter not overfilled (<2/3 full);Drainage bag/urimeter below bladder 12/21/21 1905   Output (mL) 20 mL 12/22/21 0700       Physical Exam:    Neurological:   [id number]  PERRL  EOMI  Face Symmetric  FCx4 R > L       Significant Labs:  Recent Labs   Lab 12/23/21 0341 12/24/21  0245   * 126*    139   K 4.0 4.4    109   CO2 22* 24   BUN 45* 35*   CREATININE 0.9 0.7   CALCIUM 8.0* 8.1*   MG 2.1 2.0     Recent Labs   Lab 12/23/21 0341 12/24/21  0245 12/24/21  0332   WBC 27.22* 17.35*  --    HGB 9.2* 8.8*  --    HCT 30.6* 29.0* 27*   PLT 99* 96*  --      No results for input(s): LABPT, INR, APTT in the last 48 hours.  Microbiology Results (last 7 days)     Procedure Component Value Units Date/Time    Culture, Respiratory with Gram Stain [[id number]]  (Abnormal)  (Susceptibility) Collected: 12/21/21 1015    Order Status: Completed Specimen: Respiratory from Tracheal Aspirate Updated: 12/24/21 1001     Respiratory Culture No S aureus or Pseudomonas isolated.      ESCHERICHIA COLI  Few       Gram Stain (Respiratory) <10 epithelial cells per low power field.     Gram Stain (Respiratory) Many WBC's     Gram Stain (Respiratory) Few Gram positive cocci    Blood culture [[id number]] Collected: 12/21/21 1032    Order Status: Completed Specimen: Blood from Peripheral, Antecubital, Right Updated: 12/23/21 1212     Blood Culture, Routine No Growth to date      No Growth to date      No Growth to date    Narrative:      Blood cultures x 2 different sites. 4 bottles total. Please  draw cultures before administering antibiotics. "    Blood culture [[id number]] Collected: 12/21/21 1010    Order Status: Completed Specimen: Blood from Peripheral, Forearm, Right Updated: 12/23/21 1212     Blood Culture, Routine No Growth to date      No Growth to date      No Growth to date    Narrative:      Blood cultures from 2 different sites. 4 bottles total.  Please draw before starting antibiotics.        All pertinent labs from the last 24 hours have been reviewed.    Significant Diagnostics:  I have reviewed and interpreted all pertinent imaging results/findings within the past 24 hours.

## 2021-12-24 NOTE — ASSESSMENT & PLAN NOTE
An 86F with on eliquis s/p head trauma 1 week prior now with small aSDH and tSAH and AMS c/b PEA arrest 12/20    Continue ICU care   Neurocheck per protocol   Work up:  --CTH: R temp/paretial aSDH 5-6mm thickness, 3-5mm MLS, stable on repeat as well as imaging post PEA arrest  --Follow up CT stable  Follow up head CT on Monday  Continue holding eliquis  WTE per NCC. No cuff leak. On steroid   cEEG, no seizrues 12/22  AED per Epilepsy  SBP<150  Na >135  Please page NSGY immediately for any changes in neuro status

## 2021-12-24 NOTE — NURSING
Dr. Rodriguez and Hayley NP with NCC team at bedside at this time, plan is to extubate patient. VSS. Will continue to monitor closely.

## 2021-12-24 NOTE — PLAN OF CARE
[facility] Care Plan    POC reviewed with Selma Lux and family at 1400. Pt verbalized understanding. Questions and concerns addressed. No acute events today. Pt progressing toward goals. Will continue to monitor. See below and flowsheets for full assessment and VS info.       Neuro:  [place] Coma Scale  Best Eye Response: 3-->(E3) to speech  Best Motor Response: 6-->(M6) obeys commands  Best Verbal Response: 1-->(V1) none  [place] Coma Scale Score: 10  Assessment Qualifiers: patient intubated  Pupil PERRLA: yes     24 hr Temp:  [97.8 °F (36.6 °C)-98.9 °F (37.2 °C)]     CV:   Rhythm: atrial rhythm  BP goals:   SBP < 160  MAP > 65    Resp:   O2 Device (Oxygen Therapy): nasal cannula  Vent Mode: Spont  Set Rate: 8 BPM  Oxygen Concentration (%): 30  Vt Set: 450 mL  PEEP/CPAP: 5 cmH20  Pressure Support: 10 cmH20    Plan: N/A    GI/:  SCOOBY Total Score: 20  Diet/Nutrition Received: NPO  Last Bowel Movement: 12/21/21  Voiding Characteristics: external catheter    Intake/Output Summary (Last 24 hours) at 12/24/2021 1643  Last data filed at 12/24/2021 1200  Gross per 24 hour   Intake 1454.71 ml   Output 900 ml   Net 554.71 ml     Unmeasured Output  Urine Occurrence: 1  Stool Occurrence: 1  Pad Count: 1    Labs/Accuchecks:  Recent Labs   Lab 12/21/21  0304 12/24/21  0245 12/24/21  0332   WBC  --  17.35*  --    RBC  --  3.10*  --    HGB  --  8.8*  --    HCT   < > 29.0* 27*   PLT  --  96*  --     < > = values in this interval not displayed.      Recent Labs   Lab 12/24/21  0245      K 4.4   CO2 24      BUN 35*   CREATININE 0.7   ALKPHOS 53*   ALT 18   AST 16   BILITOT 1.2*      Recent Labs   Lab 12/21/21  0255   INR 1.1   APTT 26.2    No results for input(s): CPK, CPKMB, TROPONINI, MB in the last 168 hours.    Electrolytes: N/A - electrolytes WDL  Accuchecks: Q6H    Gtts:       LDA/Wounds:  Lines/Drains/Airways       Peripherally Inserted Central Catheter Line              PICC Double Lumen 12/15/21 1616 right basilic  9 days              Drain                   NG/OG Tube 12/23/21 2300 18 Fr. Right mouth <1 day              Airway                   Airway - Non-Surgical 12/21/21 0239 Endotracheal Tube 3 days       Airway Anesthesia 12/21/21 3 days              Arterial Line              Arterial Line 12/21/21 0303 Left Brachial 3 days                  Wounds: No  Wound care consulted: No

## 2021-12-25 LAB
POCT GLUCOSE: 102 MG/DL (ref 70–110)
POCT GLUCOSE: 103 MG/DL (ref 70–110)
POCT GLUCOSE: 123 MG/DL (ref 70–110)
POCT GLUCOSE: 127 MG/DL (ref 70–110)
POCT GLUCOSE: 85 MG/DL (ref 70–110)
POCT GLUCOSE: 87 MG/DL (ref 70–110)

## 2021-12-25 PROCEDURE — 94640 AIRWAY INHALATION TREATMENT: CPT

## 2021-12-25 PROCEDURE — 25000242 PHARM REV CODE 250 ALT 637 W/ HCPCS: Performed by: PSYCHIATRY & NEUROLOGY

## 2021-12-25 PROCEDURE — 99900035 HC TECH TIME PER 15 MIN (STAT)

## 2021-12-25 PROCEDURE — 94761 N-INVAS EAR/PLS OXIMETRY MLT: CPT

## 2021-12-25 PROCEDURE — 25000003 PHARM REV CODE 250: Performed by: NURSE PRACTITIONER

## 2021-12-25 PROCEDURE — 63600175 PHARM REV CODE 636 W HCPCS: Performed by: NURSE PRACTITIONER

## 2021-12-25 PROCEDURE — 63600175 PHARM REV CODE 636 W HCPCS: Performed by: PSYCHIATRY & NEUROLOGY

## 2021-12-25 PROCEDURE — A4216 STERILE WATER/SALINE, 10 ML: HCPCS | Performed by: PSYCHIATRY & NEUROLOGY

## 2021-12-25 PROCEDURE — 92610 EVALUATE SWALLOWING FUNCTION: CPT

## 2021-12-25 PROCEDURE — 99291 CRITICAL CARE FIRST HOUR: CPT | Mod: ,,, | Performed by: PSYCHIATRY & NEUROLOGY

## 2021-12-25 PROCEDURE — 99291 PR CRITICAL CARE, E/M 30-74 MINUTES: ICD-10-PCS | Mod: ,,, | Performed by: PSYCHIATRY & NEUROLOGY

## 2021-12-25 PROCEDURE — C9254 INJECTION, LACOSAMIDE: HCPCS | Performed by: NURSE PRACTITIONER

## 2021-12-25 PROCEDURE — 27000221 HC OXYGEN, UP TO 24 HOURS

## 2021-12-25 PROCEDURE — 25000003 PHARM REV CODE 250: Performed by: PSYCHIATRY & NEUROLOGY

## 2021-12-25 PROCEDURE — 94799 UNLISTED PULMONARY SVC/PX: CPT

## 2021-12-25 PROCEDURE — 20000000 HC ICU ROOM

## 2021-12-25 PROCEDURE — 97535 SELF CARE MNGMENT TRAINING: CPT

## 2021-12-25 PROCEDURE — 63600175 PHARM REV CODE 636 W HCPCS

## 2021-12-25 RX ORDER — TALC
12 POWDER (GRAM) TOPICAL NIGHTLY PRN
Status: DISCONTINUED | OUTPATIENT
Start: 2021-12-25 | End: 2021-12-30

## 2021-12-25 RX ORDER — DEXMEDETOMIDINE HYDROCHLORIDE 4 UG/ML
0-1.4 INJECTION, SOLUTION INTRAVENOUS CONTINUOUS
Status: DISCONTINUED | OUTPATIENT
Start: 2021-12-25 | End: 2021-12-25

## 2021-12-25 RX ORDER — QUETIAPINE FUMARATE 25 MG/1
25 TABLET, FILM COATED ORAL ONCE
Status: COMPLETED | OUTPATIENT
Start: 2021-12-25 | End: 2021-12-25

## 2021-12-25 RX ADMIN — MYCOPHENOLATE MOFETIL 500 MG: 200 POWDER, FOR SUSPENSION ORAL at 09:12

## 2021-12-25 RX ADMIN — Medication 12 MG: at 09:12

## 2021-12-25 RX ADMIN — SODIUM CHLORIDE 30 MG/ML INHALATION SOLUTION 4 ML: 30 SOLUTION INHALANT at 03:12

## 2021-12-25 RX ADMIN — Medication 10 ML: at 12:12

## 2021-12-25 RX ADMIN — SODIUM CHLORIDE 30 MG/ML INHALATION SOLUTION 4 ML: 30 SOLUTION INHALANT at 08:12

## 2021-12-25 RX ADMIN — SILODOSIN 4 MG: 4 CAPSULE ORAL at 08:12

## 2021-12-25 RX ADMIN — DEXMEDETOMIDINE HYDROCHLORIDE 0.2 MCG/KG/HR: 4 INJECTION, SOLUTION INTRAVENOUS at 03:12

## 2021-12-25 RX ADMIN — SENNOSIDES AND DOCUSATE SODIUM 1 TABLET: 50; 8.6 TABLET ORAL at 08:12

## 2021-12-25 RX ADMIN — HYDROXYCHLOROQUINE SULFATE 200 MG: 200 TABLET, FILM COATED ORAL at 09:12

## 2021-12-25 RX ADMIN — IPRATROPIUM BROMIDE AND ALBUTEROL SULFATE 3 ML: 2.5; .5 SOLUTION RESPIRATORY (INHALATION) at 08:12

## 2021-12-25 RX ADMIN — ACETYLCYSTEINE 4 ML: 100 INHALANT RESPIRATORY (INHALATION) at 01:12

## 2021-12-25 RX ADMIN — ATORVASTATIN CALCIUM 40 MG: 20 TABLET, FILM COATED ORAL at 08:12

## 2021-12-25 RX ADMIN — SODIUM CHLORIDE 100 MG: 9 INJECTION, SOLUTION INTRAVENOUS at 09:12

## 2021-12-25 RX ADMIN — MONTELUKAST 10 MG: 10 TABLET, FILM COATED ORAL at 08:12

## 2021-12-25 RX ADMIN — CEFEPIME 1 G: 1 INJECTION, POWDER, FOR SOLUTION INTRAMUSCULAR; INTRAVENOUS at 05:12

## 2021-12-25 RX ADMIN — SODIUM CHLORIDE 30 MG/ML INHALATION SOLUTION 4 ML: 30 SOLUTION INHALANT at 07:12

## 2021-12-25 RX ADMIN — ACETYLCYSTEINE 4 ML: 100 INHALANT RESPIRATORY (INHALATION) at 11:12

## 2021-12-25 RX ADMIN — SODIUM CHLORIDE 30 MG/ML INHALATION SOLUTION 4 ML: 30 SOLUTION INHALANT at 11:12

## 2021-12-25 RX ADMIN — HEPARIN SODIUM 5000 UNITS: 5000 INJECTION INTRAVENOUS; SUBCUTANEOUS at 05:12

## 2021-12-25 RX ADMIN — HEPARIN SODIUM 5000 UNITS: 5000 INJECTION INTRAVENOUS; SUBCUTANEOUS at 09:12

## 2021-12-25 RX ADMIN — ACETYLCYSTEINE 4 ML: 100 INHALANT RESPIRATORY (INHALATION) at 07:12

## 2021-12-25 RX ADMIN — QUETIAPINE FUMARATE 25 MG: 25 TABLET ORAL at 02:12

## 2021-12-25 RX ADMIN — BISACODYL 10 MG: 10 SUPPOSITORY RECTAL at 08:12

## 2021-12-25 RX ADMIN — HYDROXYCHLOROQUINE SULFATE 200 MG: 200 TABLET, FILM COATED ORAL at 08:12

## 2021-12-25 RX ADMIN — SODIUM CHLORIDE 30 MG/ML INHALATION SOLUTION 4 ML: 30 SOLUTION INHALANT at 12:12

## 2021-12-25 RX ADMIN — IPRATROPIUM BROMIDE AND ALBUTEROL SULFATE 3 ML: 2.5; .5 SOLUTION RESPIRATORY (INHALATION) at 11:12

## 2021-12-25 RX ADMIN — METHYLPREDNISOLONE SODIUM SUCCINATE 20 MG: 40 INJECTION, POWDER, FOR SOLUTION INTRAMUSCULAR; INTRAVENOUS at 08:12

## 2021-12-25 RX ADMIN — ACETYLCYSTEINE 4 ML: 100 INHALANT RESPIRATORY (INHALATION) at 03:12

## 2021-12-25 RX ADMIN — ACETYLCYSTEINE 4 ML: 100 INHALANT RESPIRATORY (INHALATION) at 08:12

## 2021-12-25 RX ADMIN — SODIUM CHLORIDE 100 MG: 9 INJECTION, SOLUTION INTRAVENOUS at 08:12

## 2021-12-25 RX ADMIN — IPRATROPIUM BROMIDE AND ALBUTEROL SULFATE 3 ML: 2.5; .5 SOLUTION RESPIRATORY (INHALATION) at 01:12

## 2021-12-25 RX ADMIN — IPRATROPIUM BROMIDE AND ALBUTEROL SULFATE 3 ML: 2.5; .5 SOLUTION RESPIRATORY (INHALATION) at 03:12

## 2021-12-25 RX ADMIN — Medication 10 ML: at 05:12

## 2021-12-25 RX ADMIN — Medication 10 ML: at 06:12

## 2021-12-25 RX ADMIN — IPRATROPIUM BROMIDE AND ALBUTEROL SULFATE 3 ML: 2.5; .5 SOLUTION RESPIRATORY (INHALATION) at 07:12

## 2021-12-25 RX ADMIN — HEPARIN SODIUM 5000 UNITS: 5000 INJECTION INTRAVENOUS; SUBCUTANEOUS at 02:12

## 2021-12-25 NOTE — PLAN OF CARE
[facility] Care Plan    POC reviewed with Selma Lux and family at 0300. Pt verbalized understanding. Questions and concerns addressed. No acute events overnight. Pt increasingly agitated overnight. Precedex gtt initiated, but ended due to hypotension. No gtts currently. Pt progressing toward goals. Will continue to monitor. See below and flowsheets for full assessment and VS info.       Neuro:  [place] Coma Scale  Best Eye Response: 4-->(E4) spontaneous  Best Motor Response: 6-->(M6) obeys commands  Best Verbal Response: 4-->(V4) confused  [place] Coma Scale Score: 14  Assessment Qualifiers: patient not sedated/intubated  Pupil PERRLA: yes     24hr Temp:  [98 °F (36.7 °C)-98.4 °F (36.9 °C)]     CV:   Rhythm: atrial rhythm  BP goals:   SBP < 160  MAP > 65    Resp:   O2 Device (Oxygen Therapy): High Flow nasal Cannula  Vent Mode: Spont  Set Rate: 8 BPM  Oxygen Concentration (%): 30  Vt Set: 450 mL  PEEP/CPAP: 5 cmH20  Pressure Support: 10 cmH20    Plan: N/A    GI/:  SCOOBY Total Score: 20  Diet/Nutrition Received: NPO  Last Bowel Movement: 12/21/21  Voiding Characteristics: unable to void    Intake/Output Summary (Last 24 hours) at 12/25/2021 0618  Last data filed at 12/25/2021 0505  Gross per 24 hour   Intake 214.73 ml   Output 900 ml   Net -685.27 ml     Unmeasured Output  Urine Occurrence: 1  Stool Occurrence: 0  Pad Count: 1    Labs/Accuchecks:  Recent Labs   Lab 12/21/21  0304 12/24/21  0245 12/24/21  0332   WBC  --  17.35*  --    RBC  --  3.10*  --    HGB  --  8.8*  --    HCT   < > 29.0* 27*   PLT  --  96*  --     < > = values in this interval not displayed.      Recent Labs   Lab 12/24/21  0245      K 4.4   CO2 24      BUN 35*   CREATININE 0.7   ALKPHOS 53*   ALT 18   AST 16   BILITOT 1.2*      Recent Labs   Lab 12/21/21  0255   INR 1.1   APTT 26.2    No results for input(s): CPK, CPKMB, TROPONINI, MB in the last 168 hours.    Electrolytes: N/A - electrolytes WDL  Accuchecks: Q4H    Gtts:        LDA/Wounds:  Lines/Drains/Airways       Peripherally Inserted Central Catheter Line              PICC Double Lumen 12/15/21 1616 right basilic 9 days              Airway                 Airway Anesthesia 12/21/21 4 days              Arterial Line              Arterial Line 12/21/21 0303 Left Brachial 4 days                  Wounds: No  Wound care consulted: No

## 2021-12-25 NOTE — PLAN OF CARE
Problem: SLP Goal  Goal: SLP Goal  Description: Speech Language Pathology Goals  Goals expected to be met by 1/8:  1. Pt will participate in an ongoing assessment to determine the least restrictive and safest diet with possible updated goals to follow pending results.  2. Pt will participate in a speech, language, and cognitive evaluation with possible updated goals to follow pending results.       Outcome: Ongoing, Progressing   Bedside swallow study completed. Recommend: mechanical soft diet, thin liquids, crushed meds in puree, no straws, and strict aspiration precautions. ST will continue to follow.

## 2021-12-25 NOTE — PT/OT/SLP EVAL
Speech Language Pathology Evaluation  Bedside Swallow    Patient Name:  Selma Lux   MRN:  [id number]   [id number] A    Admitting Diagnosis: SDH (subdural hematoma)    Recommendations:                 General Recommendations:  Dysphagia therapy, Speech language evaluation and Cognitive-linguistic evaluation  Diet recommendations:  Mechanical soft, Thin; meds crushed to puree  Aspiration Precautions:   · 1 small bite/sip at a time,   · Alternating bites/sips,   · Assistance with meals,   · Feed only when awake/alert,   · HOB to 90 degrees,   · Continue to monitor for signs and symptoms of aspiration and discontinue oral feeding should you notice any of the following: watery eyes, reddened facial area, wet vocal quality, increased work of breathing, change in respiratory status, increased congestion, coughing, fever, etc.  General Precautions: Standard, fall,aspiration,seizure  Communication strategies:  none    History:     History reviewed. No pertinent past medical history.    History reviewed. No pertinent surgical history.    MD note 12/24: History of Present Illness: 86-year-old female with past medical history of TIA comes to the emergency department for AMS.  She was found on the floor about 1 hour ago only responding to painful stimuli.  Patient occasionally says her name.GCS 12 noted for EMS.  Patient does take blood thinners. She had a mechanical fall out of bed on Thursday hitting her head and has been complaining of intermittent headaches since. Her last dose of eliquis was on 12/11. NSGY consulted for CTH with SDH    CT head: No detrimental change compared to prior studies.  Evolving mixed density subdural collection overlying the right cerebral hemisphere associated with mass effect including sulci effacement and 0.5 cm leftward midline shift.  Stable subdural hematoma along the right tentorial leaflet.  Remote punctate infarcts in the right cerebellar hemisphere and right tatyana.    Chest X-Rays: The  distal aspect of the enteric tube is at the expected level of the distal esophagus, if subdiaphragmatic positioning is intended then advancement is recommended.  Mild parenchymal change at the left lung base may relate to mild basilar infiltrate or atelectasis, otherwise stable intrathoracic appearance.    Prior diet: NPO prior to intubation    Subjective     SLP communicated with RN prior to entry.   Patient seen awake with daughter present in room.     Pain/Comfort:  · Pain Rating Post-Intervention 1: 0/10    Objective:     Oral Musculature Evaluation  · Oral Musculature: general weakness  · Dentition: scattered dentition (uses partial dentures to eat which are not currently in the hospital)  · Secretion Management: coughing on secretions (nasal trumpet in place)  · Mucosal Quality: adequate  · Mandibular Strength and Mobility: WFL  · Oral Labial Strength and Mobility: impaired coordination  · Lingual Strength and Mobility: impaired strength  · Volitional Cough: decreased intensity  · Volitional Swallow: timely  · Voice Prior to PO Intake: mildly hoarse    Bedside Swallow Eval:   Consistencies Assessed:  · Thin liquids sips of water via teaspoon, cup, and straw  · Puree bites of apple sauce  · Solids bites of eryn cracker     Oral Phase:   With eryn cracker:  · Prolonged mastication  · Oral residue  · Slow oral transit time   · Inconsistent difficulty with oral coordination to accept liquids via cup and straw    Pharyngeal Phase:   · coughing/choking s/p 1 large straw sip  · No overt s/s of aspiration with teaspoon and cup sips of thin, puree, or eryn cracker    Compensatory Strategies  · None    Treatment: SLP provided education on SLP recommendations, SLP role, s/s and risks of aspiration, safe swallow precautions, and POC. All questions addressed within SLP scope and patient's daughter verbalized understanding. White board updated. SLP communicated recommendations with RN.      Assessment:     Selma  Jose J is a 86 y.o. female with an SLP diagnosis of Dysphagia. ST will continue to follow.     Goals:   Multidisciplinary Problems     SLP Goals        Problem: SLP Goal    Goal Priority Disciplines Outcome   SLP Goal     SLP Ongoing, Progressing   Description: Speech Language Pathology Goals  Goals expected to be met by 1/8:  1. Pt will participate in an ongoing assessment to determine the least restrictive and safest diet with possible updated goals to follow pending results.  2. Pt will participate in a speech, language, and cognitive evaluation with possible updated goals to follow pending results.                        Plan:     · Patient to be seen:  4 x/week   · Plan of Care expires:  01/14/21  · Plan of Care reviewed with:  patient,daughter   · SLP Follow-Up:  Yes       Discharge recommendations:  rehabilitation facility   Barriers to Discharge:  None per ST dsicipline    Time Tracking:     SLP Treatment Date:   12/25/21  Speech Start Time:  0700  Speech Stop Time:  0718     Speech Total Time (min):  18 min    Billable Minutes: Eval Swallow and Oral Function 10 and Self Care/Home Management Training 8    12/25/2021

## 2021-12-25 NOTE — PROGRESS NOTES
[facility] Neuro Critical [facility]  Neurocritical Care  Progress Note    Admit Date: 12/13/2021  Service Date: 12/25/2021  Length of Stay: 12    Subjective:     Chief Complaint: SDH (subdural hematoma)    History of Present Illness:   The patient is an 86-year-old female with past medical history of TIA, RA and Afib of Eliquis admitted to [facility] with Right SDH.  She was found on the floor this morning only responding to painful stimuli.  Patient occasionally says her name.GCS 12 noted for EMS.  PCC administered. She had a mechanical fall out of bed on Thursday hitting her head and has been complaining of intermittent headaches since. Her last dose of eliquis was on 12/11. Pending repeat CT head. NSGY consulted. In addition patient was found to be hyponatremic at 118-central line placed and hypertonic saline infusion initiated. Pending repeat CT head. Patient admitted to [facility] for close monitoring and higher level of care.       Hospital Course: 12/13/2021: patient admitted to [facility] s/p fall on 12/11 on Eliquis  12/14/2021 wheezing, prolonged expiratory phase.   12/15/2021 copious respiratory secretions. Remains encephalopathic. Review EEG  12/16/2021 improved respiratory secretions. D/c nasal tracheal airway if minimal secretions.   12/17/2021 restless.   12/18/2021 lethargic, rising BUN/Cr ratio. Fluid resuscitation. Received flumazenil for BDZ   12/19/2021 encephalopathy overnight. Work up neg so far. BUN/Cr >20 from volume contraction.   12/20/2021 left facial twitching overnight. Loaded with vimpat.   12/21/2021 event overnight cardiac arrest/PEA likely related to respiratory distress. Intubated.   12/22/2021 Add precedex to enable vent weaning.  12/23/2021   extubation trial today  12/24/2021 extubated over cook catheter due to concern for laryngeal edema. Successfully extubated this am. Close monitoring.   12/25/2021 agitated delirium overnight.       Review of Symptoms: encephalopathic cannot  participate    Medications:  Continuous Infusions:    Scheduled Meds:   acetylcysteine 100 mg/ml (10%)  4 mL Nebulization QID WAKE    albuterol-ipratropium  3 mL Nebulization Q4H    amLODIPine  10 mg Oral Daily    atorvastatin  40 mg Oral Daily    ceFEPime (MAXIPIME) IVPB  1 g Intravenous Q12H    heparin (porcine)  5,000 Units Subcutaneous Q8H    hydrOXYchloroQUINE  200 mg Oral BID    lacosamide (VIMPAT) IVPB  100 mg Intravenous Q12H    methylPREDNISolone sodium succinate injection  20 mg Intravenous Daily    montelukast  10 mg Oral Daily    mycophenolate mofetil  500 mg Oral BID    senna-docusate 8.6-50 mg  1 tablet Oral Daily    silodosin  4 mg Oral Daily    sodium chloride 0.9%  10 mL Intravenous Q6H    sodium chloride 3%  4 mL Nebulization Q4H     PRN Meds:.acetaminophen, albuterol-ipratropium, bisacodyL, calcium gluconate IVPB, calcium gluconate IVPB, calcium gluconate IVPB, dextrose 50%, glucagon (human recombinant), insulin aspart U-100, labetalol, magnesium sulfate IVPB, magnesium sulfate IVPB, melatonin, melatonin, ondansetron, potassium chloride in water **AND** potassium chloride in water **AND** potassium chloride in water, racepinephrine, Flushing PICC Protocol **AND** sodium chloride 0.9% **AND** sodium chloride 0.9%    OBJECTIVE:   Vital Signs (Most Recent):   Temp: 98.3 °F (36.8 °C) (12/25/21 0705)  Pulse: 107 (12/25/21 1005)  Resp: (!) 28 (12/25/21 1005)  BP: (!) 101/43 (12/25/21 1005)  SpO2: 98 % (12/25/21 1005)    Vital Signs (24h Range):   Temp:  [98 °F (36.7 °C)-98.4 °F (36.9 °C)] 98.3 °F (36.8 °C)  Pulse:  [] 107  Resp:  [17-41] 28  SpO2:  [93 %-100 %] 98 %  BP: ()/(43-69) 101/43  Arterial Line BP: (125-152)/(48-67) 149/67    ICP/CPP (Last 24h):        I & O (Last 24h):     Intake/Output Summary (Last 24 hours) at 12/25/2021 1029  Last data filed at 12/25/2021 0905  Gross per 24 hour   Intake 220.87 ml   Output 900 ml   Net -679.13 ml     Physical Exam: unchanged from  previous day  GA: awake, comfortable, no acute distress.   HEENT: No scleral icterus or JVD. Neck supple  Pulmonary: Air entry equal to auscultation A/P/L. Wheezing no, crackles no  Cardiac: RRR, S1 & S2 w/o rubs/murmurs/gallops.   Abdominal: soft, non-tender, bowel sounds present x 4. No appreciable hepatosplenomegaly.  Skin: No jaundice, rashes, or visible lesions.  Neuro:  --Mental Status:  Drowsy but easily arousable, follows one step commands. Able to show two fingers Spontaneous eye opening.   --Pupils 3.5 mm, PERRL.   --Corneal reflex not done in this arousable patient, gag, cough intact.  Decreased movement (improving) on the left UE and LE  MSK:  No edema in UE and LE    Vent Data:        Lines/Drains/Airway:      Airway Anesthesia 12/21/21 (Active)      PICC Double Lumen 12/15/21 1616 right basilic (Active)   Verification by X-ray Yes 12/25/21 0705   Site Assessment No drainage;No redness;No swelling;No warmth;Not assessed 12/25/21 0705   Extremity Assessment Distal to IV No abnormal discoloration;No redness;No swelling;No warmth 12/25/21 0705   Line Securement Device Secured with sutureless device 12/25/21 0705   Dressing Type Biopatch in place 12/25/21 0705   Dressing Status Clean;Dry;Intact 12/25/21 0705   Dressing Intervention Integrity maintained 12/25/21 0705   Date on Dressing 12/22/21 12/25/21 0705   Dressing Due to be Changed 12/29/21 12/25/21 0705   Left Lumen Patency/Care Infusing 12/25/21 0705   Right Lumen Patency/Care Infusing 12/25/21 0705   Current Insertion Depth (cm) 33 cm 12/15/21 1616   Current Exposed Catheter (cm) 0 cm 12/15/21 1616   Extremity Circumference (cm) 33 cm 12/15/21 1616   Waveform Normal 12/25/21 0705   Line Necessity Review Poor venous access 12/25/21 0705      Arterial Line 12/21/21 0303 Left Brachial (Active)   Site Assessment Clean;Dry;Intact 12/25/21 0705   Line Status Pulsatile blood flow 12/25/21 0705   Art Line Waveform Appropriate;Square wave test performed  12/25/21 0705   Arterial Line Interventions Zeroed and calibrated 12/25/21 0705   Color/Movement/Sensation Capillary refill less than 3 sec 12/25/21 0705   Dressing Type Biopatch in place 12/25/21 0705   Dressing Status Clean;Dry;Intact 12/25/21 0705   Dressing Intervention Integrity maintained 12/25/21 0705   Dressing Change Due 12/28/21 12/25/21 0705   Tubing Change Due 12/28/21 12/25/21 0705           Nutrition/Tube Feeds (if NPO state why): tf    Labs:  ABG:   No results for input(s): PH, PO2, PCO2, HCO3, POCSATURATED, BE in the last 24 hours.  BMP:  No results for input(s): NA, K, CL, CO2, BUN, CREATININE, GLU, MG, PHOS in the last 24 hours.  LFT:   Lab Results   Component Value Date    AST 16 12/24/2021    ALT 18 12/24/2021    ALKPHOS 53 (L) 12/24/2021    BILITOT 1.2 (H) 12/24/2021    ALBUMIN 2.7 (L) 12/24/2021    PROT 4.5 (L) 12/24/2021     CBC:   Lab Results   Component Value Date    WBC 17.35 (H) 12/24/2021    HGB 8.8 (L) 12/24/2021    HCT 27 (L) 12/24/2021    MCV 94 12/24/2021    PLT 96 (L) 12/24/2021     Microbiology x 7d:   Microbiology Results (last 7 days)     Procedure Component Value Units Date/Time    Blood culture [[id number]] Collected: 12/21/21 1010    Order Status: Completed Specimen: Blood from Peripheral, Forearm, Right Updated: 12/24/21 1212     Blood Culture, Routine No Growth to date      No Growth to date      No Growth to date      No Growth to date    Narrative:      Blood cultures from 2 different sites. 4 bottles total.  Please draw before starting antibiotics.    Blood culture [[id number]] Collected: 12/21/21 1032    Order Status: Completed Specimen: Blood from Peripheral, Antecubital, Right Updated: 12/24/21 1212     Blood Culture, Routine No Growth to date      No Growth to date      No Growth to date      No Growth to date    Narrative:      Blood cultures x 2 different sites. 4 bottles total. Please  draw cultures before administering antibiotics.    Culture, Respiratory with Gram  Stain [[id number]]  (Abnormal)  (Susceptibility) Collected: 12/21/21 1015    Order Status: Completed Specimen: Respiratory from Tracheal Aspirate Updated: 12/24/21 1001     Respiratory Culture No S aureus or Pseudomonas isolated.      ESCHERICHIA COLI  Few       Gram Stain (Respiratory) <10 epithelial cells per low power field.     Gram Stain (Respiratory) Many WBC's     Gram Stain (Respiratory) Few Gram positive cocci        Imaging:  CXR 12/23 -lung fields clear. Lines in place. Widened mediastinum (POA)  I personally reviewed the above image.  Today I independently reviewed pertinent medications, lines/drains/airways, imaging, cardiology results, laboratory results, microbiology results, notably:   ASSESSMENT/PLAN:     Active Hospital Problems    Diagnosis    *SDH (subdural hematoma)    Cardiac arrest    Encephalopathy    Twitching    Stroke    Hyponatremia    Age-related physical debility    Leukocytosis    RA (rheumatoid arthritis)      Neuro:   SDH with brain compression  Acute encephalopathy likely multifactorial  Add melatonin 12mg at 5pm  Repeat CT head 12/18 - crowding and mass effect of right hemisphere from overlying SDH.   MRI brain 12/19 - demonstrated SDH with mass effect. No large territory ischemia to explain neurological decline  Loaded with vimpat for left facial twitching on 12/19.     Pulmonary:   Solumedrol (conversion from home regimen of prednisone)  Duonebs/3% nacl/mucomyst/Chest PT       Cardiac:   SBP goal <160mmhg  EF 65%  Post cardiac arrest/PEA likely respiratory due to secretions on 12/20-21 night- full recovery    Renal:    BUN/Cr >20   Making urine  silodosin for urinary retention  Am labs      ID:   Afebrile, leucocytosis - likely steroid effect  Panculture- E. coli in sputum  Continue cefepime x7 days  Check procalcitonin -0.59>>0.67    Hem/Onc:   Transfuse for Hb <7 or 8 if associated with hypotension  plt count slight down trend but stable  Am labs    Endocrine:     Hyperglycemia - start sliding scale if BS>200 schedule insulin aspart 2units q4hrly  HbA1c 5.3%     Fluids/Electrolytes/Nutrition/GI:   Replete lytes as needed  Tf - advance to goal  Lines:  Art +  CVC + PICC  ETT NA  Hill NA. Bladder scan twice a shift  NG  NA  PEG NA    Proph:  DVT:SCD/heparin  Constipation:  Last output:bowel regimen as needed  PUP: pepcid  VAP: peridex    Family updated at bedside    Uninterrupted Critical Care/Counseling Time (not including procedures):: 30 mins    Juan Irwin MD, FN  Neurocritical care attending    Full Code    Juan Irwin MD  Neurocritical Care  [facility] - Neuro Critical Care

## 2021-12-26 LAB
ALBUMIN SERPL BCP-MCNC: 2.9 G/DL (ref 3.5–5.2)
ALP SERPL-CCNC: 57 U/L (ref 55–135)
ALT SERPL W/O P-5'-P-CCNC: 30 U/L (ref 10–44)
ANION GAP SERPL CALC-SCNC: 10 MMOL/L (ref 8–16)
ANISOCYTOSIS BLD QL SMEAR: SLIGHT
AST SERPL-CCNC: 26 U/L (ref 10–40)
BACTERIA BLD CULT: NORMAL
BACTERIA BLD CULT: NORMAL
BASOPHILS # BLD AUTO: 0.03 K/UL (ref 0–0.2)
BASOPHILS NFR BLD: 0.2 % (ref 0–1.9)
BILIRUB SERPL-MCNC: 1.5 MG/DL (ref 0.1–1)
BUN SERPL-MCNC: 29 MG/DL (ref 8–23)
BURR CELLS BLD QL SMEAR: ABNORMAL
CALCIUM SERPL-MCNC: 8.4 MG/DL (ref 8.7–10.5)
CHLORIDE SERPL-SCNC: 110 MMOL/L (ref 95–110)
CO2 SERPL-SCNC: 21 MMOL/L (ref 23–29)
CREAT SERPL-MCNC: 0.7 MG/DL (ref 0.5–1.4)
DIFFERENTIAL METHOD: ABNORMAL
EOSINOPHIL # BLD AUTO: 0 K/UL (ref 0–0.5)
EOSINOPHIL NFR BLD: 0.2 % (ref 0–8)
ERYTHROCYTE [DISTWIDTH] IN BLOOD BY AUTOMATED COUNT: 16.8 % (ref 11.5–14.5)
EST. GFR  (AFRICAN AMERICAN): >60 ML/MIN/1.73 M^2
EST. GFR  (NON AFRICAN AMERICAN): >60 ML/MIN/1.73 M^2
GLUCOSE SERPL-MCNC: 57 MG/DL (ref 70–110)
HCT VFR BLD AUTO: 33.6 % (ref 37–48.5)
HGB BLD-MCNC: 10.2 G/DL (ref 12–16)
IMM GRANULOCYTES # BLD AUTO: 0.85 K/UL (ref 0–0.04)
IMM GRANULOCYTES NFR BLD AUTO: 4.5 % (ref 0–0.5)
LYMPHOCYTES # BLD AUTO: 1.2 K/UL (ref 1–4.8)
LYMPHOCYTES NFR BLD: 6.3 % (ref 18–48)
MCH RBC QN AUTO: 28.8 PG (ref 27–31)
MCHC RBC AUTO-ENTMCNC: 30.4 G/DL (ref 32–36)
MCV RBC AUTO: 95 FL (ref 82–98)
MONOCYTES # BLD AUTO: 4.1 K/UL (ref 0.3–1)
MONOCYTES NFR BLD: 21.5 % (ref 4–15)
NEUTROPHILS # BLD AUTO: 12.9 K/UL (ref 1.8–7.7)
NEUTROPHILS NFR BLD: 67.3 % (ref 38–73)
NRBC BLD-RTO: 0 /100 WBC
OVALOCYTES BLD QL SMEAR: ABNORMAL
PLATELET # BLD AUTO: 84 K/UL (ref 150–450)
PLATELET BLD QL SMEAR: ABNORMAL
PMV BLD AUTO: 11.8 FL (ref 9.2–12.9)
POCT GLUCOSE: 100 MG/DL (ref 70–110)
POCT GLUCOSE: 111 MG/DL (ref 70–110)
POCT GLUCOSE: 139 MG/DL (ref 70–110)
POCT GLUCOSE: 147 MG/DL (ref 70–110)
POCT GLUCOSE: 58 MG/DL (ref 70–110)
POCT GLUCOSE: 81 MG/DL (ref 70–110)
POIKILOCYTOSIS BLD QL SMEAR: ABNORMAL
POTASSIUM SERPL-SCNC: 3.7 MMOL/L (ref 3.5–5.1)
PROT SERPL-MCNC: 4.8 G/DL (ref 6–8.4)
RBC # BLD AUTO: 3.54 M/UL (ref 4–5.4)
SCHISTOCYTES BLD QL SMEAR: ABNORMAL
SCHISTOCYTES BLD QL SMEAR: PRESENT
SODIUM SERPL-SCNC: 141 MMOL/L (ref 136–145)
WBC # BLD AUTO: 19.08 K/UL (ref 3.9–12.7)

## 2021-12-26 PROCEDURE — 99900035 HC TECH TIME PER 15 MIN (STAT)

## 2021-12-26 PROCEDURE — 25000003 PHARM REV CODE 250: Performed by: NURSE PRACTITIONER

## 2021-12-26 PROCEDURE — 97168 OT RE-EVAL EST PLAN CARE: CPT

## 2021-12-26 PROCEDURE — 80053 COMPREHEN METABOLIC PANEL: CPT | Performed by: NURSE PRACTITIONER

## 2021-12-26 PROCEDURE — 99291 CRITICAL CARE FIRST HOUR: CPT | Mod: ,,, | Performed by: PSYCHIATRY & NEUROLOGY

## 2021-12-26 PROCEDURE — 94640 AIRWAY INHALATION TREATMENT: CPT

## 2021-12-26 PROCEDURE — C9254 INJECTION, LACOSAMIDE: HCPCS | Performed by: NURSE PRACTITIONER

## 2021-12-26 PROCEDURE — 94799 UNLISTED PULMONARY SVC/PX: CPT

## 2021-12-26 PROCEDURE — 63600175 PHARM REV CODE 636 W HCPCS: Performed by: NURSE PRACTITIONER

## 2021-12-26 PROCEDURE — 27000221 HC OXYGEN, UP TO 24 HOURS

## 2021-12-26 PROCEDURE — 94668 MNPJ CHEST WALL SBSQ: CPT

## 2021-12-26 PROCEDURE — 94761 N-INVAS EAR/PLS OXIMETRY MLT: CPT

## 2021-12-26 PROCEDURE — 63600175 PHARM REV CODE 636 W HCPCS: Performed by: PSYCHIATRY & NEUROLOGY

## 2021-12-26 PROCEDURE — 51798 US URINE CAPACITY MEASURE: CPT

## 2021-12-26 PROCEDURE — 97530 THERAPEUTIC ACTIVITIES: CPT

## 2021-12-26 PROCEDURE — 31720 CLEARANCE OF AIRWAYS: CPT

## 2021-12-26 PROCEDURE — A4216 STERILE WATER/SALINE, 10 ML: HCPCS | Performed by: PSYCHIATRY & NEUROLOGY

## 2021-12-26 PROCEDURE — 99291 PR CRITICAL CARE, E/M 30-74 MINUTES: ICD-10-PCS | Mod: ,,, | Performed by: PSYCHIATRY & NEUROLOGY

## 2021-12-26 PROCEDURE — 63600175 PHARM REV CODE 636 W HCPCS

## 2021-12-26 PROCEDURE — 85025 COMPLETE CBC W/AUTO DIFF WBC: CPT | Performed by: NURSE PRACTITIONER

## 2021-12-26 PROCEDURE — 25000003 PHARM REV CODE 250

## 2021-12-26 PROCEDURE — 25000242 PHARM REV CODE 250 ALT 637 W/ HCPCS: Performed by: PSYCHIATRY & NEUROLOGY

## 2021-12-26 PROCEDURE — 25000003 PHARM REV CODE 250: Performed by: PSYCHIATRY & NEUROLOGY

## 2021-12-26 PROCEDURE — 20000000 HC ICU ROOM

## 2021-12-26 RX ADMIN — HEPARIN SODIUM 5000 UNITS: 5000 INJECTION INTRAVENOUS; SUBCUTANEOUS at 05:12

## 2021-12-26 RX ADMIN — MYCOPHENOLATE MOFETIL 500 MG: 200 POWDER, FOR SUSPENSION ORAL at 09:12

## 2021-12-26 RX ADMIN — MONTELUKAST 10 MG: 10 TABLET, FILM COATED ORAL at 09:12

## 2021-12-26 RX ADMIN — DEXTROSE MONOHYDRATE 12.5 G: 25 INJECTION, SOLUTION INTRAVENOUS at 12:12

## 2021-12-26 RX ADMIN — IPRATROPIUM BROMIDE AND ALBUTEROL SULFATE 3 ML: 2.5; .5 SOLUTION RESPIRATORY (INHALATION) at 12:12

## 2021-12-26 RX ADMIN — SODIUM CHLORIDE 100 MG: 9 INJECTION, SOLUTION INTRAVENOUS at 09:12

## 2021-12-26 RX ADMIN — ATORVASTATIN CALCIUM 40 MG: 20 TABLET, FILM COATED ORAL at 09:12

## 2021-12-26 RX ADMIN — Medication 10 ML: at 12:12

## 2021-12-26 RX ADMIN — IPRATROPIUM BROMIDE AND ALBUTEROL SULFATE 3 ML: 2.5; .5 SOLUTION RESPIRATORY (INHALATION) at 04:12

## 2021-12-26 RX ADMIN — HEPARIN SODIUM 5000 UNITS: 5000 INJECTION INTRAVENOUS; SUBCUTANEOUS at 09:12

## 2021-12-26 RX ADMIN — ACETYLCYSTEINE 4 ML: 100 INHALANT RESPIRATORY (INHALATION) at 08:12

## 2021-12-26 RX ADMIN — Medication 10 ML: at 05:12

## 2021-12-26 RX ADMIN — SODIUM CHLORIDE 30 MG/ML INHALATION SOLUTION 4 ML: 30 SOLUTION INHALANT at 08:12

## 2021-12-26 RX ADMIN — Medication 10 ML: at 06:12

## 2021-12-26 RX ADMIN — IPRATROPIUM BROMIDE AND ALBUTEROL SULFATE 3 ML: 2.5; .5 SOLUTION RESPIRATORY (INHALATION) at 08:12

## 2021-12-26 RX ADMIN — SILODOSIN 4 MG: 4 CAPSULE ORAL at 09:12

## 2021-12-26 RX ADMIN — SODIUM CHLORIDE 30 MG/ML INHALATION SOLUTION 4 ML: 30 SOLUTION INHALANT at 12:12

## 2021-12-26 RX ADMIN — HYDROXYCHLOROQUINE SULFATE 200 MG: 200 TABLET, FILM COATED ORAL at 09:12

## 2021-12-26 RX ADMIN — CEFEPIME 1 G: 1 INJECTION, POWDER, FOR SOLUTION INTRAMUSCULAR; INTRAVENOUS at 04:12

## 2021-12-26 RX ADMIN — POTASSIUM CHLORIDE 40 MEQ: 29.8 INJECTION, SOLUTION INTRAVENOUS at 05:12

## 2021-12-26 RX ADMIN — SODIUM CHLORIDE 30 MG/ML INHALATION SOLUTION 4 ML: 30 SOLUTION INHALANT at 04:12

## 2021-12-26 RX ADMIN — ACETYLCYSTEINE 4 ML: 100 INHALANT RESPIRATORY (INHALATION) at 12:12

## 2021-12-26 RX ADMIN — AMLODIPINE BESYLATE 10 MG: 10 TABLET ORAL at 09:12

## 2021-12-26 RX ADMIN — ACETYLCYSTEINE 4 ML: 100 INHALANT RESPIRATORY (INHALATION) at 04:12

## 2021-12-26 RX ADMIN — HEPARIN SODIUM 5000 UNITS: 5000 INJECTION INTRAVENOUS; SUBCUTANEOUS at 01:12

## 2021-12-26 RX ADMIN — METHYLPREDNISOLONE SODIUM SUCCINATE 20 MG: 40 INJECTION, POWDER, FOR SOLUTION INTRAMUSCULAR; INTRAVENOUS at 09:12

## 2021-12-26 NOTE — PLAN OF CARE
[facility] Care Plan    POC reviewed with Selma Lux and family at 0300. Pt verbalized understanding. Questions and concerns of family addressed. No acute events overnight. Pt progressing toward goals. Will continue to monitor. See below and flowsheets for full assessment and VS info.       Neuro:  Marion Coma Scale  Best Eye Response: 4-->(E4) spontaneous  Best Motor Response: 6-->(M6) obeys commands  Best Verbal Response: 4-->(V4) confused  Marion Coma Scale Score: 14  Assessment Qualifiers: patient not sedated/intubated  Pupil PERRLA: yes     24hr Temp:  [98.3 °F (36.8 °C)-99.1 °F (37.3 °C)]     CV:   Rhythm: atrial rhythm  BP goals:   SBP < 160  MAP > 65    Resp:   O2 Device (Oxygen Therapy): nasal cannula  Vent Mode: Spont  Set Rate: 8 BPM  Oxygen Concentration (%): 30  Vt Set: 450 mL  PEEP/CPAP: 5 cmH20  Pressure Support: 10 cmH20    Plan: N/A    GI/:  SCOOBY Total Score: 20  Diet/Nutrition Received: NPO  Last Bowel Movement: 12/26/21  Voiding Characteristics: unable to void    Intake/Output Summary (Last 24 hours) at 12/26/2021 0617  Last data filed at 12/26/2021 0505  Gross per 24 hour   Intake 200 ml   Output 1250 ml   Net -1050 ml     Unmeasured Output  Urine Occurrence: 1  Stool Occurrence: 1  Pad Count: 1    Labs/Accuchecks:  Recent Labs   Lab 12/26/21  0028   WBC 19.08*   RBC 3.54*   HGB 10.2*   HCT 33.6*   PLT 84*      Recent Labs   Lab 12/26/21  0028      K 3.7   CO2 21*      BUN 29*   CREATININE 0.7   ALKPHOS 57   ALT 30   AST 26   BILITOT 1.5*      Recent Labs   Lab 12/21/21  0255   INR 1.1   APTT 26.2    No results for input(s): CPK, CPKMB, TROPONINI, MB in the last 168 hours.    Electrolytes: Electrolytes replaced  Accuchecks: Q4H    Gtts:       LDA/Wounds:  Lines/Drains/Airways       Peripherally Inserted Central Catheter Line              PICC Double Lumen 12/15/21 1616 right basilic 10 days              Airway                 Airway Anesthesia 12/21/21 5 days              Arterial  Line              Arterial Line 12/21/21 0303 Left Brachial 5 days                  Wounds: No  Wound care consulted: No

## 2021-12-26 NOTE — ASSESSMENT & PLAN NOTE
An 86F with on eliquis s/p head trauma 1 week prior now with small aSDH and tSAH and AMS c/b PEA arrest 12/20    Continue ICU care   Neurocheck per protocol   Work up:  --CTH: R temp/paretial aSDH 5-6mm thickness, 3-5mm MLS, stable on repeat as well as imaging post PEA arrest  --Follow up CT stable  Follow up head CT on Monday  Continue holding eliquis  AED per Epilepsy  SBP<150  Na >135  Medical management per NCC  Please page NSGY immediately for any changes in neuro status

## 2021-12-26 NOTE — PROGRESS NOTES
[facility]  Neurocritical Care  Progress Note    Admit Date: 12/13/2021  Service Date: 12/26/2021  Length of Stay: 13    Subjective:     Chief Complaint: SDH (subdural hematoma)    History of Present Illness:   The patient is an 86-year-old female with past medical history of TIA, RA and Afib of Eliquis admitted to [facility] with Right SDH.  She was found on the floor this morning only responding to painful stimuli.  Patient occasionally says her name.GCS 12 noted for EMS.  PCC administered. She had a mechanical fall out of bed on Thursday hitting her head and has been complaining of intermittent headaches since. Her last dose of eliquis was on 12/11. Pending repeat CT head. NSGY consulted. In addition patient was found to be hyponatremic at 118-central line placed and hypertonic saline infusion initiated. Pending repeat CT head. Patient admitted to [facility] for close monitoring and higher level of care.       Hospital Course: 12/13/2021: patient admitted to [facility] s/p fall on 12/11 on Eliquis  12/14/2021 wheezing, prolonged expiratory phase.   12/15/2021 copious respiratory secretions. Remains encephalopathic. Review EEG  12/16/2021 improved respiratory secretions. D/c nasal tracheal airway if minimal secretions.   12/17/2021 restless.   12/18/2021 lethargic, rising BUN/Cr ratio. Fluid resuscitation. Received flumazenil for BDZ   12/19/2021 encephalopathy overnight. Work up neg so far. BUN/Cr >20 from volume contraction.   12/20/2021 left facial twitching overnight. Loaded with vimpat.   12/21/2021 event overnight cardiac arrest/PEA likely related to respiratory distress. Intubated.   12/22/2021 Add precedex to enable vent weaning.  12/23/2021   extubation trial today  12/24/2021 extubated over cook catheter due to concern for laryngeal edema. Successfully extubated this am. Close monitoring.   12/25/2021 agitated delirium overnight.   12/26/2021 increase cough assist frequency.       Review of Symptoms:  encephalopathic cannot participate    Medications:  Continuous Infusions:    Scheduled Meds:   acetylcysteine 100 mg/ml (10%)  4 mL Nebulization QID WAKE    albuterol-ipratropium  3 mL Nebulization Q4H    amLODIPine  10 mg Oral Daily    atorvastatin  40 mg Oral Daily    ceFEPime (MAXIPIME) IVPB  1 g Intravenous Q12H    heparin (porcine)  5,000 Units Subcutaneous Q8H    hydrOXYchloroQUINE  200 mg Oral BID    lacosamide (VIMPAT) IVPB  100 mg Intravenous Q12H    methylPREDNISolone sodium succinate injection  20 mg Intravenous Daily    montelukast  10 mg Oral Daily    mycophenolate mofetil  500 mg Oral BID    senna-docusate 8.6-50 mg  1 tablet Oral Daily    silodosin  4 mg Oral Daily    sodium chloride 0.9%  10 mL Intravenous Q6H    sodium chloride 3%  4 mL Nebulization Q4H     PRN Meds:.acetaminophen, albuterol-ipratropium, bisacodyL, calcium gluconate IVPB, calcium gluconate IVPB, calcium gluconate IVPB, dextrose 50%, glucagon (human recombinant), insulin aspart U-100, labetalol, magnesium sulfate IVPB, magnesium sulfate IVPB, melatonin, ondansetron, potassium chloride in water **AND** potassium chloride in water **AND** potassium chloride in water, racepinephrine, Flushing PICC Protocol **AND** sodium chloride 0.9% **AND** sodium chloride 0.9%    OBJECTIVE:   Vital Signs (Most Recent):   Temp: 98.4 °F (36.9 °C) (12/26/21 0705)  Pulse: (!) 116 (12/26/21 0905)  Resp: (!) 21 (12/26/21 0905)  BP: 126/63 (12/26/21 0905)  SpO2: 98 % (12/26/21 0905)    Vital Signs (24h Range):   Temp:  [98.4 °F (36.9 °C)-99.1 °F (37.3 °C)] 98.4 °F (36.9 °C)  Pulse:  [104-118] 116  Resp:  [14-51] 21  SpO2:  [80 %-100 %] 98 %  BP: ()/(41-86) 126/63  Arterial Line BP: ()/(48-91) 132/55    ICP/CPP (Last 24h):        I & O (Last 24h):     Intake/Output Summary (Last 24 hours) at 12/26/2021 1004  Last data filed at 12/26/2021 0905  Gross per 24 hour   Intake 185.56 ml   Output 1250 ml   Net -1064.44 ml     Physical  Exam: unchanged from previous day  GA: drowsy, comfortable, no acute distress.   HEENT: No scleral icterus or JVD. Neck supple  Pulmonary: Air entry equal to auscultation A/P/L. Wheezing no, crackles no  Cardiac: RRR, S1 & S2 w/o rubs/murmurs/gallops.   Abdominal: soft, non-tender, bowel sounds present x 4. No appreciable hepatosplenomegaly.  Skin: No jaundice, rashes, or visible lesions.  Neuro:  --Mental Status:  Drowsy but easily arousable, follows one step commands. Able to show two fingers Spontaneous eye opening.   --Pupils 3.5 mm, PERRL.   --Corneal reflex not done in this arousable patient, gag, cough intact.  Decreased movement (improving) on the left UE and LE  MSK:  No edema in UE and LE    Vent Data:      Lines/Drains/Airway:      Airway Anesthesia 12/21/21 (Active)      PICC Double Lumen 12/15/21 1616 right basilic (Active)   Verification by X-ray Yes 12/26/21 0705   Site Assessment No drainage;No redness;No swelling;No warmth 12/26/21 0705   Extremity Assessment Distal to IV No abnormal discoloration;No redness;No swelling;No warmth 12/26/21 0705   Line Securement Device Secured with sutureless device 12/26/21 0705   Dressing Type Biopatch in place;Central line dressing 12/26/21 0705   Dressing Status Clean;Dry;Intact 12/26/21 0705   Dressing Intervention Integrity maintained 12/26/21 0705   Date on Dressing 12/22/21 12/26/21 0705   Dressing Due to be Changed 12/29/21 12/26/21 0705   Left Lumen Patency/Care Infusing 12/26/21 0705   Right Lumen Patency/Care Normal saline locked 12/26/21 0705   Current Insertion Depth (cm) 33 cm 12/15/21 1616   Current Exposed Catheter (cm) 0 cm 12/15/21 1616   Extremity Circumference (cm) 33 cm 12/15/21 1616   Waveform Normal 12/25/21 1505   Line Necessity Review Poor venous access 12/26/21 0705      Arterial Line 12/21/21 0303 Left Brachial (Active)   Site Assessment Clean;Dry;Intact;No redness;No swelling 12/26/21 0705   Line Status Pulsatile blood flow 12/26/21 0705    Art Line Waveform Dampened;Square wave test performed 12/26/21 0705   Arterial Line Interventions Zeroed and calibrated;Leveled;Connections checked and tightened;Flushed per protocol;Line pulled back 12/26/21 0705   Color/Movement/Sensation Capillary refill less than 3 sec 12/26/21 0705   Dressing Type Biopatch in place;Central line dressing 12/26/21 0705   Dressing Status Clean;Dry;Intact 12/26/21 0705   Dressing Intervention Integrity maintained 12/26/21 0705   Dressing Change Due 01/02/22 12/26/21 0705   Tubing Change Due 12/28/21 12/26/21 0705             Nutrition/Tube Feeds (if NPO state why): aspiration precautions    Labs:  ABG:   No results for input(s): PH, PO2, PCO2, HCO3, POCSATURATED, BE in the last 24 hours.  BMP:  Recent Labs   Lab 12/26/21  0028      K 3.7      CO2 21*   BUN 29*   CREATININE 0.7   GLU 57*     LFT:   Lab Results   Component Value Date    AST 26 12/26/2021    ALT 30 12/26/2021    ALKPHOS 57 12/26/2021    BILITOT 1.5 (H) 12/26/2021    ALBUMIN 2.9 (L) 12/26/2021    PROT 4.8 (L) 12/26/2021     CBC:   Lab Results   Component Value Date    WBC 19.08 (H) 12/26/2021    HGB 10.2 (L) 12/26/2021    HCT 33.6 (L) 12/26/2021    MCV 95 12/26/2021    PLT 84 (L) 12/26/2021     Microbiology x 7d:   Microbiology Results (last 7 days)     Procedure Component Value Units Date/Time    Blood culture [[id number]] Collected: 12/21/21 1010    Order Status: Completed Specimen: Blood from Peripheral, Forearm, Right Updated: 12/25/21 1212     Blood Culture, Routine No Growth to date      No Growth to date      No Growth to date      No Growth to date      No Growth to date    Narrative:      Blood cultures from 2 different sites. 4 bottles total.  Please draw before starting antibiotics.    Blood culture [[id number]] Collected: 12/21/21 1032    Order Status: Completed Specimen: Blood from Peripheral, Antecubital, Right Updated: 12/25/21 1212     Blood Culture, Routine No Growth to date      No Growth  to date      No Growth to date      No Growth to date      No Growth to date    Narrative:      Blood cultures x 2 different sites. 4 bottles total. Please  draw cultures before administering antibiotics.    Culture, Respiratory with Gram Stain [[id number]]  (Abnormal)  (Susceptibility) Collected: 12/21/21 1015    Order Status: Completed Specimen: Respiratory from Tracheal Aspirate Updated: 12/24/21 1001     Respiratory Culture No S aureus or Pseudomonas isolated.      ESCHERICHIA COLI  Few       Gram Stain (Respiratory) <10 epithelial cells per low power field.     Gram Stain (Respiratory) Many WBC's     Gram Stain (Respiratory) Few Gram positive cocci        Imaging:  CXR 12/23 -lung fields clear. Lines in place. Widened mediastinum (POA)  I personally reviewed the above image.  Today I independently reviewed pertinent medications, lines/drains/airways, imaging, cardiology results, laboratory results, microbiology results, notably:   ASSESSMENT/PLAN:     Active Hospital Problems    Diagnosis    *SDH (subdural hematoma)    Cardiac arrest    Encephalopathy    Twitching    Stroke    Hyponatremia    Age-related physical debility    Leukocytosis    RA (rheumatoid arthritis)      Neuro:   SDH with brain compression  Acute encephalopathy likely multifactorial  On melatonin 12mg at 5pm  Delirium precautions 10pm-6am to encourage daytime wakefulness and participation in therapy  Repeat CT head 12/18 - crowding and mass effect of right hemisphere from overlying SDH.   MRI brain 12/19 - demonstrated SDH with mass effect. No large territory ischemia to explain neurological decline  Loaded with vimpat for left facial twitching on 12/19.     Pulmonary:   Solumedrol (conversion from home regimen of prednisone)  Duonebs/3% nacl/mucomyst/Chest PT    Cardiac:   SBP goal <160mmhg  EF 65%  Post cardiac arrest/PEA likely respiratory due to secretions on 12/20-21 night- full recovery    Renal:    BUN/Cr >20   Making  urine  silodosin for urinary retention  Am labs      ID:   Afebrile, leucocytosis  Panculture- E. coli in sputum  Complete course of cefepime x7 days  Check procalcitonin -0.59>>0.67    Hem/Onc:   Transfuse for Hb <7 or 8 if associated with hypotension  plt count slight down trend but stable    Endocrine:    Hyperglycemia - start sliding scale if BS>200 schedule insulin aspart 2units q4hrly  HbA1c 5.3%     Fluids/Electrolytes/Nutrition/GI:   Replete lytes as needed  Aspiration precautions with oral intake  Lines:  Art +  CVC + PICC  ETT NA  Hill NA. Bladder scan twice a shift  NG  NA  PEG NA    Proph:  DVT:SCD/heparin  Constipation:  Last output:bowel regimen as needed  PUP: pepcid  VAP: peridex    Family updated at bedside    Uninterrupted Critical Care/Counseling Time (not including procedures):: 30 mins    Juan Irwin MD, FNCS  Neurocritical care attending    Full Code    Juan Irwin MD  Neurocritical Care  [facility] - Neuro Critical Care

## 2021-12-26 NOTE — PROGRESS NOTES
[facility] Neuro Critical Care  Neurosurgery  Progress Note    Subjective:     History of Present Illness: 86-year-old female with past medical history of TIA comes to the emergency department for AMS.  She was found on the floor about 1 hour ago only responding to painful stimuli.  Patient occasionally says her name.GCS 12 noted for EMS.  Patient does take blood thinners. She had a mechanical fall out of bed on Thursday hitting her head and has been complaining of intermittent headaches since. Her last dose of eliquis was on 12/11. NSGY consulted for CTH with SDH      Post-Op Info:  * No surgery found *         Interval History:  NAEON. Exam stable. Higher level confusion.    Medications:  Continuous Infusions:    Scheduled Meds:   acetylcysteine 100 mg/ml (10%)  4 mL Nebulization QID WAKE    albuterol-ipratropium  3 mL Nebulization Q4H    amLODIPine  10 mg Oral Daily    atorvastatin  40 mg Oral Daily    ceFEPime (MAXIPIME) IVPB  1 g Intravenous Q12H    heparin (porcine)  5,000 Units Subcutaneous Q8H    hydrOXYchloroQUINE  200 mg Oral BID    lacosamide (VIMPAT) IVPB  100 mg Intravenous Q12H    methylPREDNISolone sodium succinate injection  20 mg Intravenous Daily    montelukast  10 mg Oral Daily    mycophenolate mofetil  500 mg Oral BID    senna-docusate 8.6-50 mg  1 tablet Oral Daily    silodosin  4 mg Oral Daily    sodium chloride 0.9%  10 mL Intravenous Q6H    sodium chloride 3%  4 mL Nebulization Q4H     PRN Meds:acetaminophen, albuterol-ipratropium, bisacodyL, calcium gluconate IVPB, calcium gluconate IVPB, calcium gluconate IVPB, dextrose 50%, glucagon (human recombinant), insulin aspart U-100, labetalol, magnesium sulfate IVPB, magnesium sulfate IVPB, melatonin, ondansetron, potassium chloride in water **AND** potassium chloride in water **AND** potassium chloride in water, racepinephrine, Flushing PICC Protocol **AND** sodium chloride 0.9% **AND** sodium chloride 0.9%       Objective:      Weight: 67 kg (147 lb 11.3 oz)  Body mass index is 25.35 kg/m².  Vital Signs (Most Recent):  Temp: 98.9 °F (37.2 °C) (12/25/21 1905)  Pulse: (!) 111 (12/26/21 0305)  Resp: (!) 29 (12/26/21 0305)  BP: (!) 99/41 (12/26/21 0305)  SpO2: 100 % (12/26/21 0305) Vital Signs (24h Range):  Temp:  [98.3 °F (36.8 °C)-98.9 °F (37.2 °C)] 98.9 °F (37.2 °C)  Pulse:  [] 111  Resp:  [14-51] 29  SpO2:  [80 %-100 %] 100 %  BP: ()/(41-63) 99/41  Arterial Line BP: (125-168)/(48-80) 132/80              NG/OG Tube 12/17/21 1405 Left nostril (Active)   Placement Check placement verified by aspirate characteristics 12/22/21 0305   Tolerance no signs/symptoms of discomfort 12/22/21 0305   Securement secured to nostril center 12/22/21 0305   Clamp Status/Tolerance no abdominal distention;no abdominal discomfort;no emesis;no nausea;no residual;no restlessness 12/22/21 0305   Suction Setting/Drainage Method suction at the bedside 12/22/21 0305   Insertion Site Appearance no redness, warmth, tenderness, skin breakdown, drainage 12/22/21 0305   Flush/Irrigation flushed w/;water;no resistance met 12/22/21 0305   Feeding Type continuous;by pump 12/22/21 0305   Feeding Action feeding continued 12/22/21 0305   Current Rate (mL/hr) 10 mL/hr 12/21/21 1905   Goal Rate (mL/hr) 10 mL/hr 12/21/21 1905   Intake (mL) 100 mL 12/20/21 0800   Water Bolus (mL) 200 mL 12/21/21 1600   Rate Formula Tube Feeding (mL/hr) 10 mL/hr 12/21/21 1905   Formula Name Isosource 12/22/21 0305   Intake (mL) - Formula Tube Feeding 20 12/21/21 1800   Residual Amount (ml) 0 ml 12/21/21 1905            Urethral Catheter 12/21/21 0300 Temperature probe 16 Fr. (Active)   Site Assessment Clean;Intact 12/22/21 0305   Collection Container Urimeter 12/22/21 0305   Securement Method secured to top of thigh w/ adhesive device 12/22/21 0305   Catheter Care Performed yes 12/22/21 0305   Reason for Continuing Urinary Catheterization Critically ill in ICU and requiring hourly  "monitoring of intake/output 12/22/21 0305   CAUTI Prevention Bundle StatLock in place w 1" slack;Intact seal between catheter & drainage tubing;Drainage bag/urimeter off the floor;Sheeting clip in use;No dependent loops or kinks;Drainage bag/urimeter not overfilled (<2/3 full);Drainage bag/urimeter below bladder 12/21/21 1905   Output (mL) 20 mL 12/22/21 0700     Physical Exam    AAOx3, PERRL, EOMI, FS, TM, 5/5 throughout, SILT.  Abdomen soft  No resp distress  Extremities intact      Significant Labs:  Recent Labs   Lab 12/26/21  0028   GLU 57*      K 3.7      CO2 21*   BUN 29*   CREATININE 0.7   CALCIUM 8.4*     Recent Labs   Lab 12/26/21  0028   WBC 19.08*   HGB 10.2*   HCT 33.6*   PLT 84*     No results for input(s): LABPT, INR, APTT in the last 48 hours.  Microbiology Results (last 7 days)     Procedure Component Value Units Date/Time    Blood culture [[id number]] Collected: 12/21/21 1010    Order Status: Completed Specimen: Blood from Peripheral, Forearm, Right Updated: 12/25/21 1212     Blood Culture, Routine No Growth to date      No Growth to date      No Growth to date      No Growth to date      No Growth to date    Narrative:      Blood cultures from 2 different sites. 4 bottles total.  Please draw before starting antibiotics.    Blood culture [[id number]] Collected: 12/21/21 1032    Order Status: Completed Specimen: Blood from Peripheral, Antecubital, Right Updated: 12/25/21 1212     Blood Culture, Routine No Growth to date      No Growth to date      No Growth to date      No Growth to date      No Growth to date    Narrative:      Blood cultures x 2 different sites. 4 bottles total. Please  draw cultures before administering antibiotics.    Culture, Respiratory with Gram Stain [[id number]]  (Abnormal)  (Susceptibility) Collected: 12/21/21 1015    Order Status: Completed Specimen: Respiratory from Tracheal Aspirate Updated: 12/24/21 1001     Respiratory Culture No S aureus or Pseudomonas " isolated.      ESCHERICHIA COLI  Few       Gram Stain (Respiratory) <10 epithelial cells per low power field.     Gram Stain (Respiratory) Many WBC's     Gram Stain (Respiratory) Few Gram positive cocci        All pertinent labs from the last 24 hours have been reviewed.    Significant Diagnostics:  I have reviewed and interpreted all pertinent imaging results/findings within the past 24 hours.    Assessment/Plan:     * SDH (subdural hematoma)  An 86F with on eliquis s/p head trauma 1 week prior now with small aSDH and tSAH and AMS c/b PEA arrest 12/20    Continue ICU care   Neurocheck per protocol   Work up:  --CTH: R temp/paretial aSDH 5-6mm thickness, 3-5mm MLS, stable on repeat as well as imaging post PEA arrest  --Follow up CT stable  Follow up head CT on Monday  Continue holding eliquis  AED per Epilepsy  SBP<150  Na >135  PT/OT and OOB  Medical management per NCC  Please page NSGY immediately for any changes in neuro status          Adrein Jain MD  Neurosurgery  [facility] - [facility]

## 2021-12-26 NOTE — SUBJECTIVE & OBJECTIVE
Interval History:  NAEON. Exam stable. Higher level confusion.    Medications:  Continuous Infusions:    Scheduled Meds:   acetylcysteine 100 mg/ml (10%)  4 mL Nebulization QID WAKE    albuterol-ipratropium  3 mL Nebulization Q4H    amLODIPine  10 mg Oral Daily    atorvastatin  40 mg Oral Daily    ceFEPime (MAXIPIME) IVPB  1 g Intravenous Q12H    heparin (porcine)  5,000 Units Subcutaneous Q8H    hydrOXYchloroQUINE  200 mg Oral BID    lacosamide (VIMPAT) IVPB  100 mg Intravenous Q12H    methylPREDNISolone sodium succinate injection  20 mg Intravenous Daily    montelukast  10 mg Oral Daily    mycophenolate mofetil  500 mg Oral BID    senna-docusate 8.6-50 mg  1 tablet Oral Daily    silodosin  4 mg Oral Daily    sodium chloride 0.9%  10 mL Intravenous Q6H    sodium chloride 3%  4 mL Nebulization Q4H     PRN Meds:acetaminophen, albuterol-ipratropium, bisacodyL, calcium gluconate IVPB, calcium gluconate IVPB, calcium gluconate IVPB, dextrose 50%, glucagon (human recombinant), insulin aspart U-100, labetalol, magnesium sulfate IVPB, magnesium sulfate IVPB, melatonin, ondansetron, potassium chloride in water **AND** potassium chloride in water **AND** potassium chloride in water, racepinephrine, Flushing PICC Protocol **AND** sodium chloride 0.9% **AND** sodium chloride 0.9%       Objective:     Weight: 67 kg (147 lb 11.3 oz)  Body mass index is 25.35 kg/m².  Vital Signs (Most Recent):  Temp: 98.9 °F (37.2 °C) (12/25/21 1905)  Pulse: (!) 111 (12/26/21 0305)  Resp: (!) 29 (12/26/21 0305)  BP: (!) 99/41 (12/26/21 0305)  SpO2: 100 % (12/26/21 0305) Vital Signs (24h Range):  Temp:  [98.3 °F (36.8 °C)-98.9 °F (37.2 °C)] 98.9 °F (37.2 °C)  Pulse:  [] 111  Resp:  [14-51] 29  SpO2:  [80 %-100 %] 100 %  BP: ()/(41-63) 99/41  Arterial Line BP: (125-168)/(48-80) 132/80              NG/OG Tube 12/17/21 1405 Left nostril (Active)   Placement Check placement verified by aspirate characteristics 12/22/21 1479  "  Tolerance no signs/symptoms of discomfort 12/22/21 0305   Securement secured to nostril center 12/22/21 0305   Clamp Status/Tolerance no abdominal distention;no abdominal discomfort;no emesis;no nausea;no residual;no restlessness 12/22/21 0305   Suction Setting/Drainage Method suction at the bedside 12/22/21 0305   Insertion Site Appearance no redness, warmth, tenderness, skin breakdown, drainage 12/22/21 0305   Flush/Irrigation flushed w/;water;no resistance met 12/22/21 0305   Feeding Type continuous;by pump 12/22/21 0305   Feeding Action feeding continued 12/22/21 0305   Current Rate (mL/hr) 10 mL/hr 12/21/21 1905   Goal Rate (mL/hr) 10 mL/hr 12/21/21 1905   Intake (mL) 100 mL 12/20/21 0800   Water Bolus (mL) 200 mL 12/21/21 1600   Rate Formula Tube Feeding (mL/hr) 10 mL/hr 12/21/21 1905   Formula Name Isosource 12/22/21 0305   Intake (mL) - Formula Tube Feeding 20 12/21/21 1800   Residual Amount (ml) 0 ml 12/21/21 1905            Urethral Catheter 12/21/21 0300 Temperature probe 16 Fr. (Active)   Site Assessment Clean;Intact 12/22/21 0305   Collection Container Urimeter 12/22/21 0305   Securement Method secured to top of thigh w/ adhesive device 12/22/21 0305   Catheter Care Performed yes 12/22/21 0305   Reason for Continuing Urinary Catheterization Critically ill in ICU and requiring hourly monitoring of intake/output 12/22/21 0305   CAUTI Prevention Bundle StatLock in place w 1" slack;Intact seal between catheter & drainage tubing;Drainage bag/urimeter off the floor;Sheeting clip in use;No dependent loops or kinks;Drainage bag/urimeter not overfilled (<2/3 full);Drainage bag/urimeter below bladder 12/21/21 1905   Output (mL) 20 mL 12/22/21 0700     Physical Exam    AAOx3, PERRL, EOMI, FS, TM, 5/5 throughout, SILT.  Abdomen soft  No resp distress  Extremities intact      Significant Labs:  Recent Labs   Lab 12/26/21  0028   GLU 57*      K 3.7      CO2 21*   BUN 29*   CREATININE 0.7   CALCIUM 8.4* "     Recent Labs   Lab 12/26/21  0028   WBC 19.08*   HGB 10.2*   HCT 33.6*   PLT 84*     No results for input(s): LABPT, INR, APTT in the last 48 hours.  Microbiology Results (last 7 days)     Procedure Component Value Units Date/Time    Blood culture [[id number]] Collected: 12/21/21 1010    Order Status: Completed Specimen: Blood from Peripheral, Forearm, Right Updated: 12/25/21 1212     Blood Culture, Routine No Growth to date      No Growth to date      No Growth to date      No Growth to date      No Growth to date    Narrative:      Blood cultures from 2 different sites. 4 bottles total.  Please draw before starting antibiotics.    Blood culture [[id number]] Collected: 12/21/21 1032    Order Status: Completed Specimen: Blood from Peripheral, Antecubital, Right Updated: 12/25/21 1212     Blood Culture, Routine No Growth to date      No Growth to date      No Growth to date      No Growth to date      No Growth to date    Narrative:      Blood cultures x 2 different sites. 4 bottles total. Please  draw cultures before administering antibiotics.    Culture, Respiratory with Gram Stain [[id number]]  (Abnormal)  (Susceptibility) Collected: 12/21/21 1015    Order Status: Completed Specimen: Respiratory from Tracheal Aspirate Updated: 12/24/21 1001     Respiratory Culture No S aureus or Pseudomonas isolated.      ESCHERICHIA COLI  Few       Gram Stain (Respiratory) <10 epithelial cells per low power field.     Gram Stain (Respiratory) Many WBC's     Gram Stain (Respiratory) Few Gram positive cocci        All pertinent labs from the last 24 hours have been reviewed.    Significant Diagnostics:  I have reviewed and interpreted all pertinent imaging results/findings within the past 24 hours.

## 2021-12-26 NOTE — PT/OT/SLP RE-EVAL
Occupational Therapy   Re-evaluation/tx  New orders received 12-24 -21   Name: Selma Lux  MRN: [id number]  Admitting Diagnosis:  SDH (subdural hematoma)  Recent Surgery: * No surgery found *      Recommendations:     Discharge Recommendations: rehabilitation facility  Discharge Equipment Recommendations:   (TBD)  Barriers to discharge:  Other (Comment) (requires extensive assist)    Assessment:     Selma Lux is a 86 y.o. female with a medical diagnosis of SDH (subdural hematoma).  She presents with significant deficits on this date with mobility. Pt. Required Total A for rolling as well as supine to sit. Pt. Sat EOB but with posterior lean requiring Total A to maintain sit EOB.  Pt. Was initially sleepy but level of arousal did improve when seated EOB. Pt. HR noted to be elevated during session from 125 to 130.    Performance deficits affecting function are weakness,impaired endurance,impaired self care skills,impaired functional mobilty,gait instability,impaired cognition,impaired balance,decreased upper extremity function,decreased lower extremity function. Pt. Would benefit from continued oT services to maximize safety and I with ADL tasks.       Rehab Prognosis:  good; patient would benefit from acute skilled OT services to address these deficits and reach maximum level of function.       Plan:     Patient to be seen 3 x/week to address the above listed problems via self-care/home management,therapeutic activities,therapeutic exercises,neuromuscular re-education,cognitive retraining  · Plan of Care Expires: 01/25/22  · Plan of Care Reviewed with: patient,daughter    Subjective     Chief Complaint: Let me lay back down please  Patient/Family stated goals: to have pt. Get better and return home . Daughter (Susu) reported that delerium has been an issue for the patient.   Communicated with: nurse prior to session.  Pain/Comfort:  · Pain Rating 1: 0/10  · Pain Rating Post-Intervention 1: 0/10    Objective:      Communicated with: nurse prior to session.  Patient found supine with: telemetry,pulse ox (continuous),PICC line,pressure relief boots,oxygen upon OT entry to room.    General Precautions: Standard, fall,aspiration,seizure   Orthopedic Precautions:N/A   Braces: N/A  Respiratory Status: Nasal cannula, flow 1 L/min    Occupational Performance:    Bed Mobility:    · Patient completed Rolling/Turning to Left with  total assistance  · Patient completed Rolling/Turning to Right with total assistance  · Patient completed Scooting/Bridging with total assistance  · Patient completed Supine to Sit with total assistance  · Patient completed Sit to Supine with total assistance and 2 persons    Functional Mobility/Transfers:  · drawsheet x 2 to HOB  ·     Activities of Daily Living:  · Grooming: total assistance to wipe face with wet wash cloth (daughter assisted to awaken pt. prior to sitting up)    Cognitive/Visual Perceptual:  Cognitive/Psychosocial Skills:     -       Oriented to: Person   -       Follows Commands/attention:Follows one-step commands  -       Communication: clear/fluent  -       Memory: No Deficits noted  -       Safety awareness/insight to disability: fair   -       Mood/Affect/Coping skills/emotional control: Appropriate to situation  Visual/Perceptual:      -not tested .    Physical Exam:  Balance: -       Pt. required Total A to sit EOB on this date secondary to strong posterior lean  Postural examination/scapula alignment:    -       Rounded shoulders  -       Forward head  -       Posterior pelvic tilt  Skin integrity: Visible skin intact  Upper Extremity Range of Motion:  Unable to accurately assess    AMPAC 6 Click:  AMPAC Total Score: 9    Treatment & Education:  Pt. And pt. Daughter educated on role of OT and pOC  Education:      Patient left left sidelying with all lines intact, call button in reach, nurse notified and daughter present    GOALS:   Multidisciplinary Problems     Occupational  Therapy Goals        Problem: Occupational Therapy Goal    Goal Priority Disciplines Outcome Interventions   Occupational Therapy Goal     OT, PT/OT Ongoing, Progressing    Description: Pt. Re-evaluated 12-26-21 Goals to be met 01-10-22  UBD with Min A  Bed mobility with Mod A  Grooming seated EOB with Min A  Toileting with Mod A  SQPT to bedside chair with Mod A  Pt. To follow 3/3 simple commands  Sit to stand with Mod A      Goals to be met by: 12/26/2021 (10 days)     Patient will increase functional independence with ADLs by performing:    UE Dressing with Moderate Assistance.  LE Dressing with Maximum Assistance.  Grooming while seated with Minimal Assistance.  Toileting from bedside commode with Minimal Assistance for hygiene and clothing management.   Sitting at edge of bed x10 minutes with Minimal Assistance.  Rolling to Bilateral with Minimal Assistance.   Supine to sit with Minimal Assistance.  Stand pivot transfers with Moderate Assistance.  Step transfer with Moderate Assistance  Toilet transfer to bedside commode with Moderate Assistance.                     History:     History reviewed. No pertinent past medical history.    History reviewed. No pertinent surgical history.    Time Tracking:     OT Date of Treatment: 12/26/21  OT Start Time: 1105  OT Stop Time: 1141  OT Total Time (min): 36 min    Billable Minutes:Evaluation 15  Therapeutic Activity 21 12/26/2021

## 2021-12-27 ENCOUNTER — PATIENT MESSAGE (OUTPATIENT)
Dept: REHABILITATION | Facility: OTHER | Age: 86
End: 2021-12-27
Payer: MEDICARE

## 2021-12-27 LAB
ACANTHOCYTES BLD QL SMEAR: PRESENT
ANISOCYTOSIS BLD QL SMEAR: SLIGHT
BASOPHILS # BLD AUTO: 0.03 K/UL (ref 0–0.2)
BASOPHILS NFR BLD: 0.1 % (ref 0–1.9)
BURR CELLS BLD QL SMEAR: ABNORMAL
DIFFERENTIAL METHOD: ABNORMAL
EOSINOPHIL # BLD AUTO: 0.1 K/UL (ref 0–0.5)
EOSINOPHIL NFR BLD: 0.6 % (ref 0–8)
ERYTHROCYTE [DISTWIDTH] IN BLOOD BY AUTOMATED COUNT: 17.2 % (ref 11.5–14.5)
HCT VFR BLD AUTO: 33.2 % (ref 37–48.5)
HGB BLD-MCNC: 9.7 G/DL (ref 12–16)
IMM GRANULOCYTES # BLD AUTO: 0.66 K/UL (ref 0–0.04)
IMM GRANULOCYTES NFR BLD AUTO: 3 % (ref 0–0.5)
LYMPHOCYTES # BLD AUTO: 1.4 K/UL (ref 1–4.8)
LYMPHOCYTES NFR BLD: 6.4 % (ref 18–48)
MCH RBC QN AUTO: 28.4 PG (ref 27–31)
MCHC RBC AUTO-ENTMCNC: 29.2 G/DL (ref 32–36)
MCV RBC AUTO: 97 FL (ref 82–98)
MONOCYTES # BLD AUTO: 3.8 K/UL (ref 0.3–1)
MONOCYTES NFR BLD: 17.3 % (ref 4–15)
NEUTROPHILS # BLD AUTO: 16.1 K/UL (ref 1.8–7.7)
NEUTROPHILS NFR BLD: 72.6 % (ref 38–73)
NRBC BLD-RTO: 0 /100 WBC
PLATELET # BLD AUTO: 118 K/UL (ref 150–450)
PLATELET BLD QL SMEAR: ABNORMAL
PMV BLD AUTO: 11.7 FL (ref 9.2–12.9)
POCT GLUCOSE: 171 MG/DL (ref 70–110)
POCT GLUCOSE: 216 MG/DL (ref 70–110)
POCT GLUCOSE: 237 MG/DL (ref 70–110)
POCT GLUCOSE: 67 MG/DL (ref 70–110)
POCT GLUCOSE: 73 MG/DL (ref 70–110)
POCT GLUCOSE: 80 MG/DL (ref 70–110)
POCT GLUCOSE: 85 MG/DL (ref 70–110)
POIKILOCYTOSIS BLD QL SMEAR: SLIGHT
POLYCHROMASIA BLD QL SMEAR: ABNORMAL
RBC # BLD AUTO: 3.42 M/UL (ref 4–5.4)
WBC # BLD AUTO: 22.18 K/UL (ref 3.9–12.7)

## 2021-12-27 PROCEDURE — 99232 SBSQ HOSP IP/OBS MODERATE 35: CPT | Mod: ,,, | Performed by: NEUROLOGICAL SURGERY

## 2021-12-27 PROCEDURE — 20000000 HC ICU ROOM

## 2021-12-27 PROCEDURE — 97535 SELF CARE MNGMENT TRAINING: CPT

## 2021-12-27 PROCEDURE — 25000242 PHARM REV CODE 250 ALT 637 W/ HCPCS: Performed by: PSYCHIATRY & NEUROLOGY

## 2021-12-27 PROCEDURE — 92523 SPEECH SOUND LANG COMPREHEN: CPT

## 2021-12-27 PROCEDURE — 31720 CLEARANCE OF AIRWAYS: CPT

## 2021-12-27 PROCEDURE — 25000003 PHARM REV CODE 250: Performed by: NURSE PRACTITIONER

## 2021-12-27 PROCEDURE — 94761 N-INVAS EAR/PLS OXIMETRY MLT: CPT

## 2021-12-27 PROCEDURE — 97164 PT RE-EVAL EST PLAN CARE: CPT

## 2021-12-27 PROCEDURE — 99233 SBSQ HOSP IP/OBS HIGH 50: CPT | Mod: ,,, | Performed by: PSYCHIATRY & NEUROLOGY

## 2021-12-27 PROCEDURE — 51798 US URINE CAPACITY MEASURE: CPT

## 2021-12-27 PROCEDURE — 27000221 HC OXYGEN, UP TO 24 HOURS

## 2021-12-27 PROCEDURE — 63600175 PHARM REV CODE 636 W HCPCS

## 2021-12-27 PROCEDURE — 97112 NEUROMUSCULAR REEDUCATION: CPT

## 2021-12-27 PROCEDURE — 94668 MNPJ CHEST WALL SBSQ: CPT

## 2021-12-27 PROCEDURE — 25000003 PHARM REV CODE 250: Performed by: PSYCHIATRY & NEUROLOGY

## 2021-12-27 PROCEDURE — 99233 PR SUBSEQUENT HOSPITAL CARE,LEVL III: ICD-10-PCS | Mod: ,,, | Performed by: PSYCHIATRY & NEUROLOGY

## 2021-12-27 PROCEDURE — 63600175 PHARM REV CODE 636 W HCPCS: Performed by: NURSE PRACTITIONER

## 2021-12-27 PROCEDURE — C9254 INJECTION, LACOSAMIDE: HCPCS | Performed by: NURSE PRACTITIONER

## 2021-12-27 PROCEDURE — 94640 AIRWAY INHALATION TREATMENT: CPT

## 2021-12-27 PROCEDURE — A4216 STERILE WATER/SALINE, 10 ML: HCPCS | Performed by: PSYCHIATRY & NEUROLOGY

## 2021-12-27 PROCEDURE — 85025 COMPLETE CBC W/AUTO DIFF WBC: CPT | Performed by: NURSE PRACTITIONER

## 2021-12-27 PROCEDURE — 25000003 PHARM REV CODE 250

## 2021-12-27 PROCEDURE — 63600175 PHARM REV CODE 636 W HCPCS: Performed by: PSYCHIATRY & NEUROLOGY

## 2021-12-27 PROCEDURE — 92526 ORAL FUNCTION THERAPY: CPT

## 2021-12-27 PROCEDURE — 99232 PR SUBSEQUENT HOSPITAL CARE,LEVL II: ICD-10-PCS | Mod: ,,, | Performed by: NEUROLOGICAL SURGERY

## 2021-12-27 PROCEDURE — 99900035 HC TECH TIME PER 15 MIN (STAT)

## 2021-12-27 RX ORDER — FAMOTIDINE 20 MG/1
20 TABLET, FILM COATED ORAL DAILY
Status: DISCONTINUED | OUTPATIENT
Start: 2021-12-27 | End: 2021-12-29

## 2021-12-27 RX ADMIN — SODIUM CHLORIDE 30 MG/ML INHALATION SOLUTION 4 ML: 30 SOLUTION INHALANT at 08:12

## 2021-12-27 RX ADMIN — MYCOPHENOLATE MOFETIL 500 MG: 200 POWDER, FOR SUSPENSION ORAL at 09:12

## 2021-12-27 RX ADMIN — IPRATROPIUM BROMIDE AND ALBUTEROL SULFATE 3 ML: 2.5; .5 SOLUTION RESPIRATORY (INHALATION) at 04:12

## 2021-12-27 RX ADMIN — FAMOTIDINE 20 MG: 20 TABLET ORAL at 03:12

## 2021-12-27 RX ADMIN — DEXTROSE MONOHYDRATE 12.5 G: 25 INJECTION, SOLUTION INTRAVENOUS at 12:12

## 2021-12-27 RX ADMIN — HYDROXYCHLOROQUINE SULFATE 200 MG: 200 TABLET, FILM COATED ORAL at 09:12

## 2021-12-27 RX ADMIN — ATORVASTATIN CALCIUM 40 MG: 20 TABLET, FILM COATED ORAL at 09:12

## 2021-12-27 RX ADMIN — IPRATROPIUM BROMIDE AND ALBUTEROL SULFATE 3 ML: 2.5; .5 SOLUTION RESPIRATORY (INHALATION) at 08:12

## 2021-12-27 RX ADMIN — SODIUM CHLORIDE 30 MG/ML INHALATION SOLUTION 4 ML: 30 SOLUTION INHALANT at 04:12

## 2021-12-27 RX ADMIN — HEPARIN SODIUM 5000 UNITS: 5000 INJECTION INTRAVENOUS; SUBCUTANEOUS at 02:12

## 2021-12-27 RX ADMIN — HEPARIN SODIUM 5000 UNITS: 5000 INJECTION INTRAVENOUS; SUBCUTANEOUS at 09:12

## 2021-12-27 RX ADMIN — HEPARIN SODIUM 5000 UNITS: 5000 INJECTION INTRAVENOUS; SUBCUTANEOUS at 05:12

## 2021-12-27 RX ADMIN — MONTELUKAST 10 MG: 10 TABLET, FILM COATED ORAL at 09:12

## 2021-12-27 RX ADMIN — ACETYLCYSTEINE 4 ML: 100 INHALANT RESPIRATORY (INHALATION) at 04:12

## 2021-12-27 RX ADMIN — ACETYLCYSTEINE 4 ML: 100 INHALANT RESPIRATORY (INHALATION) at 08:12

## 2021-12-27 RX ADMIN — CEFEPIME 1 G: 1 INJECTION, POWDER, FOR SOLUTION INTRAMUSCULAR; INTRAVENOUS at 05:12

## 2021-12-27 RX ADMIN — SILODOSIN 4 MG: 4 CAPSULE ORAL at 09:12

## 2021-12-27 RX ADMIN — SODIUM CHLORIDE 30 MG/ML INHALATION SOLUTION 4 ML: 30 SOLUTION INHALANT at 12:12

## 2021-12-27 RX ADMIN — INSULIN ASPART 2 UNITS: 100 INJECTION, SOLUTION INTRAVENOUS; SUBCUTANEOUS at 11:12

## 2021-12-27 RX ADMIN — SODIUM CHLORIDE 250 ML: 0.9 INJECTION, SOLUTION INTRAVENOUS at 03:12

## 2021-12-27 RX ADMIN — METHYLPREDNISOLONE SODIUM SUCCINATE 20 MG: 40 INJECTION, POWDER, FOR SOLUTION INTRAMUSCULAR; INTRAVENOUS at 08:12

## 2021-12-27 RX ADMIN — Medication 10 ML: at 06:12

## 2021-12-27 RX ADMIN — SODIUM CHLORIDE 100 MG: 9 INJECTION, SOLUTION INTRAVENOUS at 08:12

## 2021-12-27 RX ADMIN — IPRATROPIUM BROMIDE AND ALBUTEROL SULFATE 3 ML: 2.5; .5 SOLUTION RESPIRATORY (INHALATION) at 12:12

## 2021-12-27 RX ADMIN — Medication 10 ML: at 05:12

## 2021-12-27 RX ADMIN — INSULIN ASPART 2 UNITS: 100 INJECTION, SOLUTION INTRAVENOUS; SUBCUTANEOUS at 09:12

## 2021-12-27 RX ADMIN — Medication 10 ML: at 12:12

## 2021-12-27 RX ADMIN — INSULIN ASPART 4 UNITS: 100 INJECTION, SOLUTION INTRAVENOUS; SUBCUTANEOUS at 05:12

## 2021-12-27 RX ADMIN — ACETYLCYSTEINE 4 ML: 100 INHALANT RESPIRATORY (INHALATION) at 12:12

## 2021-12-27 RX ADMIN — Medication 10 ML: at 11:12

## 2021-12-27 RX ADMIN — AMLODIPINE BESYLATE 10 MG: 10 TABLET ORAL at 09:12

## 2021-12-27 NOTE — PROGRESS NOTES
[facility]  Neurosurgery  Progress Note    Subjective:     History of Present Illness: 86-year-old female with past medical history of TIA comes to the emergency department for AMS.  She was found on the floor about 1 hour ago only responding to painful stimuli.  Patient occasionally says her name.GCS 12 noted for EMS.  Patient does take blood thinners. She had a mechanical fall out of bed on Thursday hitting her head and has been complaining of intermittent headaches since. Her last dose of eliquis was on 12/11. NSGY consulted for CTH with SDH      Post-Op Info:  * No surgery found *         Interval History:  NAEON. Exam stable. Higher level confusion.    Medications:  Continuous Infusions:    Scheduled Meds:   acetylcysteine 100 mg/ml (10%)  4 mL Nebulization QID WAKE    albuterol-ipratropium  3 mL Nebulization Q4H    amLODIPine  10 mg Oral Daily    atorvastatin  40 mg Oral Daily    ceFEPime (MAXIPIME) IVPB  1 g Intravenous Q12H    famotidine  20 mg Oral Daily    heparin (porcine)  5,000 Units Subcutaneous Q8H    hydrOXYchloroQUINE  200 mg Oral BID    lacosamide (VIMPAT) IVPB  100 mg Intravenous Q12H    montelukast  10 mg Oral Daily    mycophenolate mofetil  500 mg Oral BID    [START ON 12/28/2021] predniSONE  30 mg Oral Daily    senna-docusate 8.6-50 mg  1 tablet Oral Daily    silodosin  4 mg Oral Daily    sodium chloride 0.9%  10 mL Intravenous Q6H    sodium chloride 3%  4 mL Nebulization Q4H     PRN Meds:acetaminophen, albuterol-ipratropium, bisacodyL, calcium gluconate IVPB, calcium gluconate IVPB, calcium gluconate IVPB, dextrose 50%, glucagon (human recombinant), insulin aspart U-100, labetalol, magnesium sulfate IVPB, magnesium sulfate IVPB, melatonin, ondansetron, potassium chloride in water **AND** potassium chloride in water **AND** potassium chloride in water, racepinephrine, Flushing PICC Protocol **AND** sodium chloride 0.9% **AND** sodium chloride 0.9%        Objective:     Weight: 67 kg (147 lb 11.3 oz)  Body mass index is 25.35 kg/m².  Vital Signs (Most Recent):  Temp: 98.2 °F (36.8 °C) (12/27/21 1505)  Pulse: 110 (12/27/21 1629)  Resp: (!) 25 (12/27/21 1629)  BP: 131/65 (12/27/21 1605)  SpO2: 100 % (12/27/21 1629) Vital Signs (24h Range):  Temp:  [97.4 °F (36.3 °C)-98.9 °F (37.2 °C)] 98.2 °F (36.8 °C)  Pulse:  [] 110  Resp:  [13-40] 25  SpO2:  [93 %-100 %] 100 %  BP: (111-148)/() 131/65     Date 12/27/21 0700 - 12/28/21 0659   Shift 5963-0480 2330-6015 9664-1388 24 Hour Total   INTAKE   P.O. 120   120   I.V.(mL/kg) 40(0.6) 10(0.1)  50(0.7)   IV Piggyback 98.6 193.7  292.3   Shift Total(mL/kg) 258.6(3.9) 203.7(3)  462.3(6.9)   OUTPUT   Shift Total(mL/kg)       Weight (kg) 67 67 67 67            NG/OG Tube 12/17/21 1405 Left nostril (Active)   Placement Check placement verified by aspirate characteristics 12/22/21 0305   Tolerance no signs/symptoms of discomfort 12/22/21 0305   Securement secured to nostril center 12/22/21 0305   Clamp Status/Tolerance no abdominal distention;no abdominal discomfort;no emesis;no nausea;no residual;no restlessness 12/22/21 0305   Suction Setting/Drainage Method suction at the bedside 12/22/21 0305   Insertion Site Appearance no redness, warmth, tenderness, skin breakdown, drainage 12/22/21 0305   Flush/Irrigation flushed w/;water;no resistance met 12/22/21 0305   Feeding Type continuous;by pump 12/22/21 0305   Feeding Action feeding continued 12/22/21 0305   Current Rate (mL/hr) 10 mL/hr 12/21/21 1905   Goal Rate (mL/hr) 10 mL/hr 12/21/21 1905   Intake (mL) 100 mL 12/20/21 0800   Water Bolus (mL) 200 mL 12/21/21 1600   Rate Formula Tube Feeding (mL/hr) 10 mL/hr 12/21/21 1905   Formula Name Isosource 12/22/21 0305   Intake (mL) - Formula Tube Feeding 20 12/21/21 1800   Residual Amount (ml) 0 ml 12/21/21 1905            Urethral Catheter 12/21/21 0300 Temperature probe 16 Fr. (Active)   Site Assessment Clean;Intact  "12/22/21 0305   Collection Container Urimeter 12/22/21 0305   Securement Method secured to top of thigh w/ adhesive device 12/22/21 0305   Catheter Care Performed yes 12/22/21 0305   Reason for Continuing Urinary Catheterization Critically ill in ICU and requiring hourly monitoring of intake/output 12/22/21 0305   CAUTI Prevention Bundle StatLock in place w 1" slack;Intact seal between catheter & drainage tubing;Drainage bag/urimeter off the floor;Sheeting clip in use;No dependent loops or kinks;Drainage bag/urimeter not overfilled (<2/3 full);Drainage bag/urimeter below bladder 12/21/21 1905   Output (mL) 20 mL 12/22/21 0700     Physical Exam    AAOx3, PERRL, EOMI, FS, TM, 5/5 throughout, SILT.  Abdomen soft  No resp distress  Extremities intact      Significant Labs:  Recent Labs   Lab 12/26/21  0028   GLU 57*      K 3.7      CO2 21*   BUN 29*   CREATININE 0.7   CALCIUM 8.4*     Recent Labs   Lab 12/26/21  0028 12/27/21  0953   WBC 19.08* 22.18*   HGB 10.2* 9.7*   HCT 33.6* 33.2*   PLT 84* 118*     No results for input(s): LABPT, INR, APTT in the last 48 hours.  Microbiology Results (last 7 days)     Procedure Component Value Units Date/Time    Blood culture [[id number]] Collected: 12/21/21 1032    Order Status: Completed Specimen: Blood from Peripheral, Antecubital, Right Updated: 12/26/21 1212     Blood Culture, Routine No growth after 5 days.    Narrative:      Blood cultures x 2 different sites. 4 bottles total. Please  draw cultures before administering antibiotics.    Blood culture [[id number]] Collected: 12/21/21 1010    Order Status: Completed Specimen: Blood from Peripheral, Forearm, Right Updated: 12/26/21 1212     Blood Culture, Routine No growth after 5 days.    Narrative:      Blood cultures from 2 different sites. 4 bottles total.  Please draw before starting antibiotics.    Culture, Respiratory with Gram Stain [[id number]]  (Abnormal)  (Susceptibility) Collected: 12/21/21 1015    Order " Status: Completed Specimen: Respiratory from Tracheal Aspirate Updated: 12/24/21 1001     Respiratory Culture No S aureus or Pseudomonas isolated.      ESCHERICHIA COLI  Few       Gram Stain (Respiratory) <10 epithelial cells per low power field.     Gram Stain (Respiratory) Many WBC's     Gram Stain (Respiratory) Few Gram positive cocci        All pertinent labs from the last 24 hours have been reviewed.    Significant Diagnostics:  I have reviewed and interpreted all pertinent imaging results/findings within the past 24 hours.    Assessment/Plan:     * SDH (subdural hematoma)  An 86F with on eliquis s/p head trauma 1 week prior now with small aSDH and tSAH and AMS c/b PEA arrest 12/20    Continue ICU care   Neurocheck per protocol   Work up:  --CTH: R temp/paretial aSDH 5-6mm thickness, 3-5mm MLS, stable on repeat as well as imaging post PEA arrest  --Follow up CT stable  Follow up head CT on Monday  Continue holding eliquis  AED per Epilepsy  SBP<150  Na >135  Okay to resume Eliquis today (12/27). Plan for repeat CTH once therapeutic   Medical management per NCC  Please page NSGY immediately for any changes in neuro status          Frank Gonzalez MD  Neurosurgery  [facility]

## 2021-12-27 NOTE — PHYSICIAN QUERY
PT Name: Selma Lux  MR #: [id number]     DOCUMENTATION CLARIFICATION     CDS/: Migdalia Spears   RN            Contact information: [email or web address]  This form is a permanent document in the medical record.     Query Date: December 27, 2021    By submitting this query, we are merely seeking further clarification of documentation.  Please utilize your independent clinical judgment when addressing the question(s) below.  The Medical Record contains the following   Indicators   Supporting Clinical Findings Location in Medical Record   x SOB, BEJARANO, Wheezing, Productive Cough, Use of Accessory Muscles, etc. Post cardiac arrest/PEA likely respiratory due to secretions - awake, tracks visually   Progress Note NCC 12/22 ([facility])   x RR         ABGs         O2 sat Results for DR.BACON MARY (MRN [id number]) as of 12/27/2021 12:46   Ref. Range 12/23/2021 05:56   POC PH Latest Ref Range: 7.35 - 7.45  7.526 (H)   POC PCO2 Latest Ref Range: 35 - 45 mmHg 27.7 (LL)   POC PO2 Latest Ref Range: 80 - 100 mmHg 184 (H)   POC BE Latest Ref Range: -2 to 2 mmol/L 0   POC HCO3 Latest Ref Range: 24 - 28 mmol/L 23.0 (L)   POC SATURATED O2 Latest Ref Range: 95 - 100 % 100   POC TCO2 Latest Ref Range: 23 - 27 mmol/L 24   Vt Unknown 450   PEEP Unknown 6   Sample Unknown ARTERIAL   DelSys Unknown PRB   Allens Test Unknown N/A   Site Unknown Koki/UAC   Mode Unknown SIMV   Rate Unknown 8    Labs    Hypoxia/Hypercapnia     x BiPAP/Intubation/Mechanical Ventilation Ad Hoc Intubation   Indications:  Respiratory failure    12/21/2021 event overnight cardiac arrest/PEA likely related to respiratory distress. Intubated.      Anesthesia Procedure Notes   12/21      Progress Note 12/21 NCC ([facility])    Supplemental O2      Home O2, Oxygen Dependence     x Respiratory distress or failure Ad Hoc Intubation   Indications:  Respiratory failure    12/21/2021 event overnight cardiac arrest/PEA likely related to respiratory distress. Intubated.    Anesthesia Procedure Notes   12/21      Progress Note 12/21 NCC ([facility])   x Radiology findings FINDINGS:  ET tube tip 4 cm above the anjelica.  Enteric tube tip overlies the distal esophagus.  Right PICC line tip overlies the SVC.     Wide mediastinum, unchanged from prior.     Heart and lungs  appear unchanged when allowing for differences in technique and positioning.     Impression:     ET tube tip 4 cm above the anjelica.     Enteric tube tip overlies the distal esophagus.  Suggest advanced 10 cm.     Right PICC line tip overlies the SVC.     Wide mediastinum, unchanged from prior.   Chest Xray 12/21   x Acute/Chronic Illness  86-year-old female with past medical history of TIA, RA and Afib of Eliquis admitted to [facility] with Right SDH   Progress Note 12/21 NCC ([facility])    Treatment      Other         The noted clinical guidelines are only system guidelines and do not replace the providers clinical judgment.    Provider, please specify the diagnosis or diagnoses associated with above clinical findings.   [    ] Acute Respiratory Failure,  hypoxia    [    ] Acute Respiratory Distress - Generally describes less severe respiratory symptoms (tachypnea, in respiratory distress, increased work of breathing, unable to speak in complete sentences, labored breathing, use of accessory muscles, RR> 24, cyanosis, dyspnea, wheezing, stridor, lethargy) without sufficient measurements (pO2, SpO2, pH, and pCO2) to meet criteria for respiratory failure   [    ] Acute Respiratory Insufficiency - Generally describes less severe respiratory symptoms and measurements (pO2, SpO2, pH, and pCO2) not meeting criteria for respiratory failure     [    ] No Respiratory Failure, Maintained on Vent for Routine Care or Airway Protection -  purposely intubated for airway protection (e.g.: angioedema, stroke, trauma); without meeting the criteria for respiratory failure.    [    ] Other Respiratory Diagnosis (please specify): _________________    [   ] Clinically Undetermined         Please document in your progress notes daily for the duration of treatment until resolved and include in your discharge summary.     Reference:    VIRGILIO Che MD. (2020, March 13). Acute respiratory distress syndrome: Clinical features, diagnosis, and complications in adults ([id number] AGUSTIN Ambrocio MD & [id number] LARRY Medrano MD, Eds.). Retrieved November 13, 2020, from [email or web address]-[email or web address]&source=search_result&selectedTitle=1~150&usage_type=default&display_rank=1  Form No. [id number]

## 2021-12-27 NOTE — PT/OT/SLP EVAL
Speech Language Pathology Evaluation  Cognitive Communication    Patient Name:  Selma Lux   MRN:  [id number]   [id number]/[id number] A    Admitting Diagnosis: SDH (subdural hematoma)    Recommendations:     Recommendations:                General Recommendations:  Dysphagia therapy, Speech/language therapy and Cognitive-linguistic therapy  Diet recommendations:  Puree, Thin   Aspiration Precautions:   · 1 small bite/sip at a time,   · 1:1 assistance with meals to ensure following safe swallowing precautions,   · HOB to 90 degrees,   · Meds crushed in puree and   · Strict aspiration precautions   Per Prior MBSS completed in June 2021- Aspiration Precautions:   1. Small 3-5 ml sips of thin liquids with chin tuck head posture  2. 2 swallows per sip of liquid  3. Small bites of food with chin in neutral to tucked position  4. 3-4 swallows per bite of food  5. Controlled oral swallow   General Precautions: Standard, aspiration,fall,pureed diet,seizure  Communication strategies:  none    History:     History reviewed. No pertinent past medical history.    History reviewed. No pertinent surgical history.    MD note 12/26: History of Present Illness:   The patient is an 86-year-old female with past medical history of TIA, RA and Afib of Eliquis admitted to [facility] with Right SDH.  She was found on the floor this morning only responding to painful stimuli.  Patient occasionally says her name.GCS 12 noted for EMS.  PCC administered. She had a mechanical fall out of bed on Thursday hitting her head and has been complaining of intermittent headaches since. Her last dose of eliquis was on 12/11. Pending repeat CT head. NSGY consulted. In addition patient was found to be hyponatremic at 118-central line placed and hypertonic saline infusion initiated. Pending repeat CT head. Patient admitted to [facility] for close monitoring and higher level of care.    Hospital Course: 12/13/2021: patient admitted to [facility] s/p fall on 12/11 on Eliquis  12/14/2021  wheezing, prolonged expiratory phase.   12/15/2021 copious respiratory secretions. Remains encephalopathic. Review EEG  12/16/2021 improved respiratory secretions. D/c nasal tracheal airway if minimal secretions.   12/17/2021 restless.   12/18/2021 lethargic, rising BUN/Cr ratio. Fluid resuscitation. Received flumazenil for BDZ   12/19/2021 encephalopathy overnight. Work up neg so far. BUN/Cr >20 from volume contraction.   12/20/2021 left facial twitching overnight. Loaded with vimpat.   12/21/2021 event overnight cardiac arrest/PEA likely related to respiratory distress. Intubated.   12/22/2021 Add precedex to enable vent weaning.  12/23/2021   extubation trial today  12/24/2021 extubated over cook catheter due to concern for laryngeal edema. Successfully extubated this am. Close monitoring.   12/25/2021 agitated delirium overnight.   12/26/2021 increase cough assist frequency.     Prior Intubation HX:  12/21-12/24    Modified Barium Swallow 6/5/21 (found in other medical chart MRN [id number]:   Impressions  ·  Mild to moderate oral and pharyngeal dysphagia due to:  ? Lingual incoordination  ? Delayed trigger of the swallow reflex  ? dcr retroflexion of the epiglottis during the swallow  ? dcr laryngeal elevation and closure  ? dcr tongue base retraction  ? dcr pharyngeal contraction  ? Reduce cricopharyngeal opening during swallow   General Recommendations:                1. Recommend return to outpatient speech pathology for remediation of oral and pharyngeal dysphagia, dysphagia based exercise program  Diet recommendations:   1. SOLIDS:  Regular,  2. LIQUIDS:  Thin   Aspiration Precautions:   1. Small 3-5 ml sips of thin liquids with chin tuck head posture  2. 2 swallows per sip of liquid  3. Small bites of food with chin in neutral to tucked position  4. 3-4 swallows per bite of food  5. Controlled oral swallow       Chest X-Rays 12/23: The distal aspect of the enteric tube is at the expected level of the distal  "esophagus, if subdiaphragmatic positioning is intended then advancement is recommended.  Mild parenchymal change at the left lung base may relate to mild basilar infiltrate or atelectasis, otherwise stable intrathoracic appearance.    Prior diet: NPO; patient not placed on diet s/p ST eval per MD    Social hx: Lives with daughter; documented vascular dementia per chart review    Subjective     Communicated with RN prior to entry.   Patient awake and cooperative with daughter present in room.  Daughter expressing frustration 2/2 patient never placed on a diet and tube feedings discontinued.    Pain/Comfort:  · Pain Rating Post-Intervention 1: 0/10    Objective:     During ST session, SLP questioned patient's daughter about patient's current speech/language/cognitive presentation vs. baseline 2/2 current SLP familiar to patient from prior hospitalization/SNF stay in 2020, however, no noted ST notes in EMR to compare. Patient's daughter denies prior Speech Therapy for language and cognition, and stated patient only had ST for swallowing. Daughter states patient has difficulty eating at a slow rate because "she still eats like an intern."    She reports patient is not at baseline for speech, language, and cognition because "she fell and hit her head and hasn't slept well since she's been here."    Daughter also reports a recent 'swallow test' at [facility], however, reported swallow study was with ENT. SLP found patient's other medical chart with MRN [id number] during ST session. SLP noted prior MBSS from June 2021 recommending a regular diet and thin liquids with strict precautions and strategies and Outpatient Speech Therapy for dysphagia therapy. Patient's daughter stated ENT disagreed with need for ST, and told patient's daughter Ms. Lux did not need Speech Therapy.     Upon further review of patient's EMR s/p evaluation, patient was seen in OP ST for language cognition goals in March of 2018, during multiple " prior hospitalizations in 2017, 2018, 2019, and 2020  For swallowing, speech, language, and cognitive goals.      Swallowing Treatment: Patient seen for an ongoing swallowing assessment. She presents with wet breathing sounds and a nonproductive cough in the absence of po intake. Patient assessed with sips of water via teaspoon, cup, and straw and bites of apple sauce via teaspoon. Dr. Lux presents with a timely pharyngeal swallow, clear vocal quality, no decrease in O2 sats, and no overt s/s of aspiration with all trials. SLP provided patient and family education on pureed diet and thin liquid recommendations with strict aspiration precautions and close monitoring. After SLP located patient's other medical chart with MBSS results and questioned if patient was following strict precautions at home (re: chin tuck, multiple swallows, etc.) Patient's daughter responded that Dr. Lux does follow all swallowing precautions at home, but hospital medical staff needs to assist with meals and ensure compliance with safe swallowing precautions during hospital stay.     COGNITIVE STATUS:  Behavioral Observations: required mulitple cues across ST session to open eyes  Memory:  · Immediate: wfl  · Short Term: wfl for SLP's names  · Long Term: wfl  Orientation: difficulty with temporal orientation  Attention: impaired sustained and divided attn.  Problem Solving: impaired  Insight Awareness: ongoing assessment  Sequencing:   · Mental Manipulation: impaired  Simple Money/Time Management: TBA    LANGUAGE:   Receptive Language:  Simple y/n Questions: wfl  Complex y/n Questions: wfl  Identification: wfl  1 Step Directions: wfl  2 Step Directions: impaired with before/after statements  Complex Directions: impaired    Expressive Language:   Naming:   · Divergent: impaired  · Convergent: wfl  · Confrontational: wfl  Automatic Speech: wfl  Sentence Completion: wfl  Responsive Speech: wfl  Repetition: wfl  Conversational Speech: minimal  spontaneous verbal output    Motor Speech: Ongoing assessment    Voice: clear vocal quality; decreased intensity    Education:  SLP provided patient and family education on SLP recommendations, SLP role, s/s and risks of aspiration, safe swallow precautions, and POC. All questions addressed. Patient and patient's daughter verbalized understanding and are in agreement with St POC.       Assessment:   Selma Lux is a 86 y.o. female with an SLP diagnosis of Dysphagia and Cognitive-Linguistic Impairment.  ST will continue to follow.     Goals:   Multidisciplinary Problems     SLP Goals        Problem: SLP Goal    Goal Priority Disciplines Outcome   SLP Goal     SLP Ongoing, Progressing   Description: Speech Language Pathology Goals  Goals expected to be met by 1/8:  1. Pt will participate in an ongoing assessment to determine the least restrictive and safest diet with possible updated goals to follow pending results.  2. Pt will participate in a speech, language, and cognitive evaluation with possible updated goals to follow pending results.  2. Pt will attend to therapy tasks for 5+ minutes given 1 redirection across 2 therapy sessions.   3. Pt will complete complex comprehension tasks with 90% accy given min cues.   4. Pt will name 10 items in a concrete category given min cues and no time constraints across 2 categories.   5. Pt will follow complex directions with 75% acc given 1 repetition.   6. Pt will answer temporal orientation questions with 90% acc no cues.                       Plan:   · Patient to be seen:  4 x/week   · Plan of Care expires:  01/14/21  · Plan of Care reviewed with:  patient,daughter   · SLP Follow-Up:  Yes       Discharge recommendations:  Discharge Facility/Level of Care Needs: rehabilitation facility   Barriers to Discharge:  None    Time Tracking:   SLP Treatment Date:   12/27/21  Speech Start Time:  0809  Speech Stop Time:  0853     Speech Total Time (min):  44 min    Billable Minutes:  Eval 10 , Treatment Swallowing Dysfunction 10 and Self Care/Home Management Training 24    12/27/2021

## 2021-12-27 NOTE — ASSESSMENT & PLAN NOTE
--continue mycophenolate and hydroxychloroquine  --continue steroids  --will switch to prednisone as she was on at home, family requesting  --30mg so higher than home dose  --monitor for side effects

## 2021-12-27 NOTE — PLAN OF CARE
[facility] Critical [facility]  Discharge Reassessment    Primary Care Provider: Bhargav Hirsch MD    Expected Discharge Date: 12/31/2021     Puree diet per MD.  Therapy is recommending inpatient rehab.  [facility] updated.    Reassessment (most recent)     Discharge Reassessment - 12/27/21 1617        Discharge Reassessment    Assessment Type Discharge Planning Reassessment     Did the patient's condition or plan change since previous assessment? Yes     Discharge Plan A Rehab     Discharge Plan B Skilled Nursing Facility     DME Needed Upon Discharge  none     Discharge Barriers Identified None     Why the patient remains in the hospital Requires continued medical care               Charlette Benitez RN, CCRN-K, [facility]  Neuro-Critical Care   [phone]

## 2021-12-27 NOTE — PT/OT/SLP RE-EVAL
Physical Therapy Re-Evaluation and Treatment    Patient Name:  Selma Lux   MRN:  [id number]    Recommendations:     Discharge Recommendations:  rehabilitation facility   Discharge Equipment Recommendations:  (TBD)   Barriers to discharge: Increased level of assist and Inaccessible home    Assessment:     Selma Lux is a 86 y.o. female admitted with a medical diagnosis of SDH (subdural hematoma). She presents with the following impairments/functional limitations:  weakness,impaired endurance,impaired self care skills,impaired functional mobilty,gait instability,impaired balance,impaired cognition,decreased upper extremity function,decreased lower extremity function,decreased safety awareness,impaired coordination. These deficits affect their roles and responsibilities in which they were able to complete prior to admit. Selma Lux would benefit from acute PT intervention to improve quality of life, focus on recovery of impairments, provide patient/caregiver education, reduce fall risk, and maximize (I) and safety with functional mobility. Once medically stable, recommending pt discharge to Inpatient Rehabilitation Facility. Patient presents for re-evaluation s/p extubation. Patient demonstrates improved ability to participate in therapy this date, able to perform 2 sit to stand trials with maximal assist x2. Patient noted to have wet sounding breathing and voice throughout session, cued for improved cough within PT scope of practice.    Rehab Prognosis: Good; patient would benefit from acute skilled PT services 3 x/week to address these deficits and reach maximum level of function. Patient is most appropriate to go to rehabilitation facility.  Recent Surgery: * No surgery found *      Plan:     During this hospitalization, patient to be seen 3 x/week to address the identified rehab impairments via gait training,therapeutic activities,therapeutic exercises,neuromuscular re-education and progress toward the  "following goals:    · Plan of Care Expires:  01/27/22    Subjective     Chief Complaint: None verbalized   Patient/Family Comments/Goals: "I'm doing mostly okay", "she's had that since her stroke in 2017. It's how I know he's tired" - regarding LUE chorea-like movements  Pain/Comfort:  · Pain Rating 1: 0/10  · Pain Rating Post-Intervention 1: 0/10    Patients cultural, spiritual, Sabianism conflicts given the current situation: no    Objective:     Communicated with nursing prior to session.  Patient found HOB elevated with telemetry,pulse ox (continuous),PICC line,pressure relief boots,SCD,oxygen,blood pressure cuff,bed alarm upon PT entry to room.    General Precautions: Standard, aspiration,fall,pureed diet,seizure   Orthopedic Precautions:N/A   Braces: N/A     Exams:  · Cognitive Exam:  Patient is oriented to Person, Place, Time, Situation, follows commands 80% of the time  · RLE ROM: WFL  · RLE Strength: not formally assessed due to patient anxiety sitting edge of bed, able to perform sit to stand with maximal assist x2  · LLE ROM: WFL  · LLE Strength:   not formally assessed due to patient anxiety sitting edge of bed, able to perform sit to stand with maximal assist x2    Functional Mobility:  · Bed Mobility:     · Rolling Left:  maximal assistance  · Scooting: maximal assistance  · Supine to Sit: maximal assistance  · Sit to Supine: maximal assistance  · Transfers:     · Sit to Stand: maximal assistance of 2 persons with hand-held assist with cues for hand placement, 2 trials  · Gait: Deferred due to poor standing balance   · Balance:   · Static Sitting: Poor, able to maintain for 8 minute(s) with maximal assistance progressing to minimum assistance, patient reports anxiety sitting edge of bed and demonstrates posterior lean requiring B HHA, B knees blocked, and verbal/tactile cues to orient to vertical with upright posture  · Chorea-like movements noted in LUE when sitting edge of bed, per patient's " daughter at bedside, this is baseline with fatigue or stress since stroke in 2017. MD notified  · Dynamic Sitting: Fair: Patient accepts minimal challenge, moderate assistance  · Static Standing: Poor, able to maintain for 10 seconds with maximal assistance of 2 persons, Patient required cues for upright posture and gluteal activation, patient demonstrates significant anxiety once returned to sitting requiring cuing for breathing technique  · Dynamic Standing: not assessed this visit    Therapeutic Activities and Exercises:  Patient educated on role of acute care PT and PT POC  Whiteboard updated, patient not yet clear for transfers due to significant anxiety after sit to stand transfer  Provided education on breathing techniques and coughing technique within PT scope of practice  Patient noted to be soiled once returned to supine, RN notified and per RN, performing bed bath soon, placed salvador pad per RN request    AM-PAC 6 CLICK MOBILITY  Total Score:9     Patient left HOB elevated with all lines intact, call button in reach, RN notified, bed alarm on and family present.    GOALS:   Multidisciplinary Problems     Physical Therapy Goals        Problem: Physical Therapy Goal    Goal Priority Disciplines Outcome Goal Variances Interventions   Physical Therapy Goal     PT, PT/OT Ongoing, Progressing     Description: Goals to be met by: 1/10/2022    Patient will increase functional independence with mobility by performin. Supine to sit with minimum assistance  2. Sit to supine with minimum assistance  3. Sit to stand transfer with minimum assistance  4. Bed to chair transfer with moderate assistance using LRAD as needed  5. Gait  x 5 feet with moderate assistance using LRAD as needed  6. Ascend/descend 5 stair with bilateral handrails maximal assistance using LRAD as needed  7. Lower extremity exercise program x10 reps per handout, with independence                      History:     History reviewed. No pertinent  past medical history.    History reviewed. No pertinent surgical history.    Time Tracking:     PT Received On: 12/27/21  PT Start Time: 1406     PT Stop Time: 1429  PT Total Time (min): 23 min     Billable Minutes: Re-Evaluation 15 min Neuromuscular Re-education 8 min    12/27/2021    Therapy tech utilized for session to maximize physical performance and safety

## 2021-12-27 NOTE — PLAN OF CARE
[facility] Care Plan    POC reviewed with Selma Lux and family at 1400. Pt unable to verbalize understanding. Questions and concerns addressed with family. No acute events today. Pt progressing toward goals. Will continue to monitor. See below and flowsheets for full assessment and VS info.     Delirium precautions  Weak coughing still present- frequency of cough assist increased  Arterial line DC'd      Neuro:  [place] Coma Scale  Best Eye Response: 3-->(E3) to speech  Best Motor Response: 6-->(M6) obeys commands  Best Verbal Response: 5-->(V5) oriented  [place] Coma Scale Score: 14  Assessment Qualifiers: patient not sedated/intubated  Pupil PERRLA: yes     24 hr Temp:  [97.8 °F (36.6 °C)-99.1 °F (37.3 °C)]     CV:   Rhythm: sinus tachycardia  BP goals:   SBP < 160  MAP > 65    Resp:   O2 Device (Oxygen Therapy): nasal cannula w/ humidification      Plan: N/A    GI/:  SCOOBY Total Score: 20  Diet/Nutrition Received: NPO  Last Bowel Movement: 12/26/21  Voiding Characteristics: unable to void- straight cath PRN    Intake/Output Summary (Last 24 hours) at 12/26/2021 1818  Last data filed at 12/26/2021 1805  Gross per 24 hour   Intake 339.79 ml   Output 950 ml   Net -610.21 ml     Unmeasured Output  Urine Occurrence: 0  Stool Occurrence: 2      Labs/Accuchecks:  Recent Labs   Lab 12/26/21  0028   WBC 19.08*   RBC 3.54*   HGB 10.2*   HCT 33.6*   PLT 84*      Recent Labs   Lab 12/26/21  0028      K 3.7   CO2 21*      BUN 29*   CREATININE 0.7   ALKPHOS 57   ALT 30   AST 26   BILITOT 1.5*      Recent Labs   Lab 12/21/21  0255   INR 1.1   APTT 26.2    No results for input(s): CPK, CPKMB, TROPONINI, MB in the last 168 hours.    Electrolytes: Electrolytes replaced  Accuchecks: Q4H    Gtts:       LDA/Wounds:  Lines/Drains/Airways       Peripherally Inserted Central Catheter Line              PICC Double Lumen 12/15/21 1616 right basilic 11 days              Airway                 Airway Anesthesia 12/21/21 5 days                   Wounds: No  Wound care consulted: No

## 2021-12-27 NOTE — ASSESSMENT & PLAN NOTE
Some mild confusion this morning  Encourage sleep at night and awake during the day  Delirium precautions  Melatonin at night  Prn quetiapine

## 2021-12-27 NOTE — SUBJECTIVE & OBJECTIVE
Interval History:  NAEON. Exam stable. Higher level confusion.    Medications:  Continuous Infusions:    Scheduled Meds:   acetylcysteine 100 mg/ml (10%)  4 mL Nebulization QID WAKE    albuterol-ipratropium  3 mL Nebulization Q4H    amLODIPine  10 mg Oral Daily    atorvastatin  40 mg Oral Daily    ceFEPime (MAXIPIME) IVPB  1 g Intravenous Q12H    famotidine  20 mg Oral Daily    heparin (porcine)  5,000 Units Subcutaneous Q8H    hydrOXYchloroQUINE  200 mg Oral BID    lacosamide (VIMPAT) IVPB  100 mg Intravenous Q12H    montelukast  10 mg Oral Daily    mycophenolate mofetil  500 mg Oral BID    [START ON 12/28/2021] predniSONE  30 mg Oral Daily    senna-docusate 8.6-50 mg  1 tablet Oral Daily    silodosin  4 mg Oral Daily    sodium chloride 0.9%  10 mL Intravenous Q6H    sodium chloride 3%  4 mL Nebulization Q4H     PRN Meds:acetaminophen, albuterol-ipratropium, bisacodyL, calcium gluconate IVPB, calcium gluconate IVPB, calcium gluconate IVPB, dextrose 50%, glucagon (human recombinant), insulin aspart U-100, labetalol, magnesium sulfate IVPB, magnesium sulfate IVPB, melatonin, ondansetron, potassium chloride in water **AND** potassium chloride in water **AND** potassium chloride in water, racepinephrine, Flushing PICC Protocol **AND** sodium chloride 0.9% **AND** sodium chloride 0.9%       Objective:     Weight: 67 kg (147 lb 11.3 oz)  Body mass index is 25.35 kg/m².  Vital Signs (Most Recent):  Temp: 98.2 °F (36.8 °C) (12/27/21 1505)  Pulse: 110 (12/27/21 1629)  Resp: (!) 25 (12/27/21 1629)  BP: 131/65 (12/27/21 1605)  SpO2: 100 % (12/27/21 1629) Vital Signs (24h Range):  Temp:  [97.4 °F (36.3 °C)-98.9 °F (37.2 °C)] 98.2 °F (36.8 °C)  Pulse:  [] 110  Resp:  [13-40] 25  SpO2:  [93 %-100 %] 100 %  BP: (111-148)/() 131/65     Date 12/27/21 0700 - 12/28/21 0659   Shift 8186-6528 1662-5441 8934-0079 24 Hour Total   INTAKE   P.O. 120   120   I.V.(mL/kg) 40(0.6) 10(0.1)  50(0.7)   IV Piggyback  "98.6 193.7  292.3   Shift Total(mL/kg) 258.6(3.9) 203.7(3)  462.3(6.9)   OUTPUT   Shift Total(mL/kg)       Weight (kg) 67 67 67 67            NG/OG Tube 12/17/21 1405 Left nostril (Active)   Placement Check placement verified by aspirate characteristics 12/22/21 0305   Tolerance no signs/symptoms of discomfort 12/22/21 0305   Securement secured to nostril center 12/22/21 0305   Clamp Status/Tolerance no abdominal distention;no abdominal discomfort;no emesis;no nausea;no residual;no restlessness 12/22/21 0305   Suction Setting/Drainage Method suction at the bedside 12/22/21 0305   Insertion Site Appearance no redness, warmth, tenderness, skin breakdown, drainage 12/22/21 0305   Flush/Irrigation flushed w/;water;no resistance met 12/22/21 0305   Feeding Type continuous;by pump 12/22/21 0305   Feeding Action feeding continued 12/22/21 0305   Current Rate (mL/hr) 10 mL/hr 12/21/21 1905   Goal Rate (mL/hr) 10 mL/hr 12/21/21 1905   Intake (mL) 100 mL 12/20/21 0800   Water Bolus (mL) 200 mL 12/21/21 1600   Rate Formula Tube Feeding (mL/hr) 10 mL/hr 12/21/21 1905   Formula Name Isosource 12/22/21 0305   Intake (mL) - Formula Tube Feeding 20 12/21/21 1800   Residual Amount (ml) 0 ml 12/21/21 1905            Urethral Catheter 12/21/21 0300 Temperature probe 16 Fr. (Active)   Site Assessment Clean;Intact 12/22/21 0305   Collection Container Urimeter 12/22/21 0305   Securement Method secured to top of thigh w/ adhesive device 12/22/21 0305   Catheter Care Performed yes 12/22/21 0305   Reason for Continuing Urinary Catheterization Critically ill in ICU and requiring hourly monitoring of intake/output 12/22/21 0305   CAUTI Prevention Bundle StatLock in place w 1" slack;Intact seal between catheter & drainage tubing;Drainage bag/urimeter off the floor;Sheeting clip in use;No dependent loops or kinks;Drainage bag/urimeter not overfilled (<2/3 full);Drainage bag/urimeter below bladder 12/21/21 1905   Output (mL) 20 mL 12/22/21 0700 "     Physical Exam    AAOx3, PERRL, EOMI, FS, TM, 5/5 throughout, SILT.  Abdomen soft  No resp distress  Extremities intact      Significant Labs:  Recent Labs   Lab 12/26/21  0028   GLU 57*      K 3.7      CO2 21*   BUN 29*   CREATININE 0.7   CALCIUM 8.4*     Recent Labs   Lab 12/26/21  0028 12/27/21  0953   WBC 19.08* 22.18*   HGB 10.2* 9.7*   HCT 33.6* 33.2*   PLT 84* 118*     No results for input(s): LABPT, INR, APTT in the last 48 hours.  Microbiology Results (last 7 days)     Procedure Component Value Units Date/Time    Blood culture [[id number]] Collected: 12/21/21 1032    Order Status: Completed Specimen: Blood from Peripheral, Antecubital, Right Updated: 12/26/21 1212     Blood Culture, Routine No growth after 5 days.    Narrative:      Blood cultures x 2 different sites. 4 bottles total. Please  draw cultures before administering antibiotics.    Blood culture [[id number]] Collected: 12/21/21 1010    Order Status: Completed Specimen: Blood from Peripheral, Forearm, Right Updated: 12/26/21 1212     Blood Culture, Routine No growth after 5 days.    Narrative:      Blood cultures from 2 different sites. 4 bottles total.  Please draw before starting antibiotics.    Culture, Respiratory with Gram Stain [[id number]]  (Abnormal)  (Susceptibility) Collected: 12/21/21 1015    Order Status: Completed Specimen: Respiratory from Tracheal Aspirate Updated: 12/24/21 1001     Respiratory Culture No S aureus or Pseudomonas isolated.      ESCHERICHIA COLI  Few       Gram Stain (Respiratory) <10 epithelial cells per low power field.     Gram Stain (Respiratory) Many WBC's     Gram Stain (Respiratory) Few Gram positive cocci        All pertinent labs from the last 24 hours have been reviewed.    Significant Diagnostics:  I have reviewed and interpreted all pertinent imaging results/findings within the past 24 hours.

## 2021-12-27 NOTE — ASSESSMENT & PLAN NOTE
An 86F with on eliquis s/p head trauma 1 week prior now with small aSDH and tSAH and AMS c/b PEA arrest 12/20    Continue ICU care   Neurocheck per protocol   Work up:  --CTH: R temp/paretial aSDH 5-6mm thickness, 3-5mm MLS, stable on repeat as well as imaging post PEA arrest  --Follow up CT stable  Follow up head CT on Monday  Continue holding eliquis  AED per Epilepsy  SBP<150  Na >135  Okay to resume Eliquis today (12/27). Plan for repeat CTH once therapeutic   Medical management per NCC  Please page NSGY immediately for any changes in neuro status

## 2021-12-27 NOTE — PLAN OF CARE
PT re-evaluation completed, goals reviewed and revised as appropriate    Problem: Physical Therapy Goal  Goal: Physical Therapy Goal  Description: Goals to be met by: 1/10/2022    Patient will increase functional independence with mobility by performin. Supine to sit with minimum assistance  2. Sit to supine with minimum assistance  3. Sit to stand transfer with minimum assistance  4. Bed to chair transfer with moderate assistance using LRAD as needed  5. Gait  x 5 feet with moderate assistance using LRAD as needed  6. Ascend/descend 5 stair with bilateral handrails maximal assistance using LRAD as needed  7. Lower extremity exercise program x10 reps per handout, with independence     Outcome: Ongoing, Progressing

## 2021-12-27 NOTE — PLAN OF CARE
Problem: SLP Goal  Goal: SLP Goal  Description: Speech Language Pathology Goals  Goals expected to be met by 1/8:  1. Pt will participate in an ongoing assessment to determine the least restrictive and safest diet with possible updated goals to follow pending results.  2. Pt will participate in a speech, language, and cognitive evaluation with possible updated goals to follow pending results.  2. Pt will attend to therapy tasks for 5+ minutes given 1 redirection across 2 therapy sessions.   3. Pt will complete complex comprehension tasks with 90% accy given min cues.   4. Pt will name 10 items in a concrete category given min cues and no time constraints across 2 categories.   5. Pt will follow complex directions with 75% acc given 1 repetition.   6. Pt will answer temporal orientation questions with 90% acc no cues.      Outcome: Ongoing, Progressing   ST will continue to follow.

## 2021-12-27 NOTE — PLAN OF CARE
[facility] Care Plan    POC reviewed with Selma Lux and family at 0300. Pt verbalized understanding. Questions and concerns addressed. No acute events overnight. Delirium precautions maintained overnight. CT scan completed. Pt progressing toward goals. Will continue to monitor. See below and flowsheets for full assessment and VS info.       Neuro:  [place] Coma Scale  Best Eye Response: 3-->(E3) to speech  Best Motor Response: 6-->(M6) obeys commands  Best Verbal Response: 4-->(V4) confused  [place] Coma Scale Score: 13  Assessment Qualifiers: patient not sedated/intubated  Pupil PERRLA: yes     24hr Temp:  [97.8 °F (36.6 °C)-98.9 °F (37.2 °C)]     CV:   Rhythm: sinus tachycardia  BP goals:   SBP < 160  MAP > 65    Resp:   O2 Device (Oxygen Therapy): nasal cannula w/ humidification  Vent Mode: Spont  Set Rate: 8 BPM  Oxygen Concentration (%): 30  Vt Set: 450 mL  PEEP/CPAP: 5 cmH20  Pressure Support: 10 cmH20    Plan: N/A    GI/:  SCOOBY Total Score: 20  Diet/Nutrition Received: NPO  Last Bowel Movement: 12/27/21  Voiding Characteristics: unable to void    Intake/Output Summary (Last 24 hours) at 12/27/2021 0624  Last data filed at 12/27/2021 0505  Gross per 24 hour   Intake 328.9 ml   Output 900 ml   Net -571.1 ml     Unmeasured Output  Urine Occurrence: 1  Stool Occurrence: 1  Pad Count: 1    Labs/Accuchecks:  Recent Labs   Lab 12/26/21  0028   WBC 19.08*   RBC 3.54*   HGB 10.2*   HCT 33.6*   PLT 84*      Recent Labs   Lab 12/26/21  0028      K 3.7   CO2 21*      BUN 29*   CREATININE 0.7   ALKPHOS 57   ALT 30   AST 26   BILITOT 1.5*      Recent Labs   Lab 12/21/21  0255   INR 1.1   APTT 26.2    No results for input(s): CPK, CPKMB, TROPONINI, MB in the last 168 hours.    Electrolytes: No replacement orders  Accuchecks: Q4H    Gtts:       LDA/Wounds:  Lines/Drains/Airways       Peripherally Inserted Central Catheter Line              PICC Double Lumen 12/15/21 1616 right basilic 11 days              Airway                  Airway Anesthesia 12/21/21 6 days                  Wounds: No  Wound care consulted: No

## 2021-12-27 NOTE — ASSESSMENT & PLAN NOTE
-SDH with brain compression  -non surgical  -repeat imaging reviewed  - was on apixaban and reversed with PCC on admission

## 2021-12-27 NOTE — PROGRESS NOTES
[facility] - Neuro [facility]  Neurocritical Care  Progress Note    Admit Date: 12/13/2021  Service Date: 12/27/2021  Length of Stay: 14    Subjective:     Chief Complaint: SDH (subdural hematoma)    History of Present Illness:   The patient is an 86-year-old female with past medical history of TIA, RA and Afib of Eliquis admitted to [facility] with Right SDH.  She was found on the floor this morning only responding to painful stimuli.  Patient occasionally says her name.GCS 12 noted for EMS.  PCC administered. She had a mechanical fall out of bed on Thursday hitting her head and has been complaining of intermittent headaches since. Her last dose of eliquis was on 12/11. Pending repeat CT head. NSGY consulted. In addition patient was found to be hyponatremic at 118-central line placed and hypertonic saline infusion initiated. Pending repeat CT head. Patient admitted to [facility] for close monitoring and higher level of care.       Hospital Course: 12/13/2021: patient admitted to [facility] s/p fall on 12/11 on Eliquis  12/14/2021 wheezing, prolonged expiratory phase.   12/15/2021 copious respiratory secretions. Remains encephalopathic. Review EEG  12/16/2021 improved respiratory secretions. D/c nasal tracheal airway if minimal secretions.   12/17/2021 restless.   12/18/2021 lethargic, rising BUN/Cr ratio. Fluid resuscitation. Received flumazenil for BDZ   12/19/2021 encephalopathy overnight. Work up neg so far. BUN/Cr >20 from volume contraction.   12/20/2021 left facial twitching overnight. Loaded with vimpat.   12/21/2021 event overnight cardiac arrest/PEA likely related to respiratory distress. Intubated.   12/22/2021 Add precedex to enable vent weaning.  12/23/2021   extubation trial today  12/24/2021 extubated over cook catheter due to concern for laryngeal edema. Successfully extubated this am. Close monitoring.   12/25/2021 agitated delirium overnight.   12/26/2021 increase cough assist frequency.   12/27: pulmonary toilette,  "prednisone, daughter updated at bedside, change diet to puree      Review of Symptoms:   Unable to fully obtain due to neuro status, denies pain, perseverates on food, repeats same questions    Physical Exam:  GA: Alert, comfortable, no acute distress.   HEENT: No scleral icterus or JVD.   Pulmonary: Clear to auscultation A/L. No wheezing, crackles, or rhonchi.  Cardiac: RRR S1 & S2 w/o rubs/murmurs/gallops.   Abdominal: Bowel sounds present x 4. No appreciable hepatosplenomegaly.  Skin: No jaundice, rashes, or visible lesions.  Pulses: 2+ DP bilat    Neuro:  --sedation: none  --GCS: [id number]  --Mental Status: awake, follows simple commands, when asked the year she states "22...12..[birth year]" and perseverates on this, then asks for food, doses off if left alone for over about a minute    --CN II-XII grossly intact.   --Pupils brisk bilat  --brainstem: intact  --Motor: 4/5 throughout  --sensory: intact to soft touch and pain throughout  --Reflexes: not tested  --Gait: deferred    Recent Labs   Lab 12/27/21  0953   WBC 22.18*   RBC 3.42*   HGB 9.7*   HCT 33.2*   *   MCV 97   MCH 28.4   MCHC 29.2*     No results for input(s): GLUCOSE, CALCIUM, PROT, NA, K, CO2, CL, BUN, CREATININE, ALKPHOS, ALT, AST, BILITOT in the last 24 hours.    Invalid input(s):  ALBUMIN  No results for input(s): PT, INR, APTT in the last 24 hours.       Temp: 97.4 °F (36.3 °C)  Pulse: (!) 121  Rhythm: sinus tachycardia  BP: 116/65  MAP (mmHg): 84  Resp: 20  SpO2: 95 %  O2 Device (Oxygen Therapy): nasal cannula w/ humidification    Temp  Min: 97.4 °F (36.3 °C)  Max: 98.9 °F (37.2 °C)  Pulse  Min: 99  Max: 121  BP  Min: 112/61  Max: 148/111  MAP (mmHg)  Min: 79  Max: 126  Resp  Min: 13  Max: 40  SpO2  Min: 93 %  Max: 100 %    12/26 0701 - 12/27 0700  In: 328.9 [I.V.:40]  Out: 900 [Urine:900]   Unmeasured Output  Urine Occurrence: 0  Stool Occurrence: 0  Pad Count: 1    Nutrition Prescription Ordered  Current Diet Order: NPO  Current Nutrition Support " Formula Ordered: Isosource 1.5  Current Nutrition Support Rate Ordered: 10 (ml)  Current Nutrition Support Frequency Ordered: mL/hr  Last Bowel Movement: 12/27/21    Body mass index is 25.35 kg/m².      I have personally reviewed all labs, imaging, and studies today    Scheduled Meds:   acetylcysteine 100 mg/ml (10%)  4 mL Nebulization QID WAKE    albuterol-ipratropium  3 mL Nebulization Q4H    amLODIPine  10 mg Oral Daily    atorvastatin  40 mg Oral Daily    ceFEPime (MAXIPIME) IVPB  1 g Intravenous Q12H    heparin (porcine)  5,000 Units Subcutaneous Q8H    hydrOXYchloroQUINE  200 mg Oral BID    lacosamide (VIMPAT) IVPB  100 mg Intravenous Q12H    montelukast  10 mg Oral Daily    mycophenolate mofetil  500 mg Oral BID    [START ON 12/28/2021] predniSONE  30 mg Oral Daily    senna-docusate 8.6-50 mg  1 tablet Oral Daily    silodosin  4 mg Oral Daily    sodium chloride 0.9%  10 mL Intravenous Q6H    sodium chloride 3%  4 mL Nebulization Q4H     Continuous Infusions:  PRN Meds:.acetaminophen, albuterol-ipratropium, bisacodyL, calcium gluconate IVPB, calcium gluconate IVPB, calcium gluconate IVPB, dextrose 50%, glucagon (human recombinant), insulin aspart U-100, labetalol, magnesium sulfate IVPB, magnesium sulfate IVPB, melatonin, ondansetron, potassium chloride in water **AND** potassium chloride in water **AND** potassium chloride in water, racepinephrine, Flushing PICC Protocol **AND** sodium chloride 0.9% **AND** sodium chloride 0.9%          Assessment/Plan:     Neuro  * SDH (subdural hematoma)  -SDH with brain compression  -non surgical  -repeat imaging reviewed  - was on apixaban and reversed with PCC on admission    Encephalopathy  Some mild confusion this morning  Encourage sleep at night and awake during the day  Delirium precautions  Melatonin at night  Prn quetiapine      Cardiac/Vascular  Cardiac arrest  Respiratory arrest   Now post extubation    Renal/  Hyponatremia  -diet  -prn  fluids      Orthopedic  RA (rheumatoid arthritis)  --continue mycophenolate and hydroxychloroquine  --continue steroids  --will switch to prednisone as she was on at home, family requesting  --30mg so higher than home dose  --monitor for side effects          The patient is being Prophylaxed for:  Venous Thromboembolism with: Chemical  Stress Ulcer with: H2B  Ventilator Pneumonia with: not applicable    Activity Orders          Diet Dysphagia Pureed (IDDSI Level 4) Thin: Dysphagia 1 (Pureed) starting at 12/27 0939    Turn patient starting at 12/19 1423        Full Code    Carson Kwan MD  Neurocritical Care  [facility] - Neuro Critical Care    Level III

## 2021-12-28 LAB — POCT GLUCOSE: 178 MG/DL (ref 70–110)

## 2021-12-28 PROCEDURE — 99232 SBSQ HOSP IP/OBS MODERATE 35: CPT | Mod: ,,, | Performed by: NEUROLOGICAL SURGERY

## 2021-12-28 PROCEDURE — 99233 PR SUBSEQUENT HOSPITAL CARE,LEVL III: ICD-10-PCS | Mod: ,,, | Performed by: PSYCHIATRY & NEUROLOGY

## 2021-12-28 PROCEDURE — 63600175 PHARM REV CODE 636 W HCPCS: Performed by: PSYCHIATRY & NEUROLOGY

## 2021-12-28 PROCEDURE — 25000242 PHARM REV CODE 250 ALT 637 W/ HCPCS: Performed by: PSYCHIATRY & NEUROLOGY

## 2021-12-28 PROCEDURE — 92526 ORAL FUNCTION THERAPY: CPT

## 2021-12-28 PROCEDURE — 94668 MNPJ CHEST WALL SBSQ: CPT

## 2021-12-28 PROCEDURE — 99233 SBSQ HOSP IP/OBS HIGH 50: CPT | Mod: ,,, | Performed by: PSYCHIATRY & NEUROLOGY

## 2021-12-28 PROCEDURE — 63600175 PHARM REV CODE 636 W HCPCS: Performed by: NURSE PRACTITIONER

## 2021-12-28 PROCEDURE — 97535 SELF CARE MNGMENT TRAINING: CPT

## 2021-12-28 PROCEDURE — 27000221 HC OXYGEN, UP TO 24 HOURS

## 2021-12-28 PROCEDURE — 25000003 PHARM REV CODE 250: Performed by: PSYCHIATRY & NEUROLOGY

## 2021-12-28 PROCEDURE — 27000646 HC AEROBIKA DEVICE

## 2021-12-28 PROCEDURE — 94761 N-INVAS EAR/PLS OXIMETRY MLT: CPT

## 2021-12-28 PROCEDURE — 99232 PR SUBSEQUENT HOSPITAL CARE,LEVL II: ICD-10-PCS | Mod: ,,, | Performed by: NEUROLOGICAL SURGERY

## 2021-12-28 PROCEDURE — 94640 AIRWAY INHALATION TREATMENT: CPT

## 2021-12-28 PROCEDURE — 25000003 PHARM REV CODE 250: Performed by: NURSE PRACTITIONER

## 2021-12-28 PROCEDURE — 63600175 PHARM REV CODE 636 W HCPCS

## 2021-12-28 PROCEDURE — 20000000 HC ICU ROOM

## 2021-12-28 PROCEDURE — 94799 UNLISTED PULMONARY SVC/PX: CPT

## 2021-12-28 PROCEDURE — A4216 STERILE WATER/SALINE, 10 ML: HCPCS | Performed by: PSYCHIATRY & NEUROLOGY

## 2021-12-28 PROCEDURE — 99900035 HC TECH TIME PER 15 MIN (STAT)

## 2021-12-28 PROCEDURE — 94664 DEMO&/EVAL PT USE INHALER: CPT

## 2021-12-28 RX ORDER — LACOSAMIDE 10 MG/ML
100 SOLUTION ORAL EVERY 12 HOURS
Status: DISCONTINUED | OUTPATIENT
Start: 2021-12-28 | End: 2022-01-04 | Stop reason: HOSPADM

## 2021-12-28 RX ADMIN — SODIUM CHLORIDE 30 MG/ML INHALATION SOLUTION 4 ML: 30 SOLUTION INHALANT at 01:12

## 2021-12-28 RX ADMIN — ACETYLCYSTEINE 4 ML: 100 INHALANT RESPIRATORY (INHALATION) at 09:12

## 2021-12-28 RX ADMIN — IPRATROPIUM BROMIDE AND ALBUTEROL SULFATE 3 ML: 2.5; .5 SOLUTION RESPIRATORY (INHALATION) at 11:12

## 2021-12-28 RX ADMIN — ACETYLCYSTEINE 4 ML: 100 INHALANT RESPIRATORY (INHALATION) at 08:12

## 2021-12-28 RX ADMIN — IPRATROPIUM BROMIDE AND ALBUTEROL SULFATE 3 ML: 2.5; .5 SOLUTION RESPIRATORY (INHALATION) at 01:12

## 2021-12-28 RX ADMIN — LACOSAMIDE 100 MG: 10 SOLUTION ORAL at 09:12

## 2021-12-28 RX ADMIN — SODIUM CHLORIDE 30 MG/ML INHALATION SOLUTION 4 ML: 30 SOLUTION INHALANT at 08:12

## 2021-12-28 RX ADMIN — HYDROXYCHLOROQUINE SULFATE 200 MG: 200 TABLET, FILM COATED ORAL at 09:12

## 2021-12-28 RX ADMIN — HEPARIN SODIUM 5000 UNITS: 5000 INJECTION INTRAVENOUS; SUBCUTANEOUS at 06:12

## 2021-12-28 RX ADMIN — PREDNISONE 30 MG: 20 TABLET ORAL at 09:12

## 2021-12-28 RX ADMIN — ATORVASTATIN CALCIUM 40 MG: 20 TABLET, FILM COATED ORAL at 09:12

## 2021-12-28 RX ADMIN — IPRATROPIUM BROMIDE AND ALBUTEROL SULFATE 3 ML: 2.5; .5 SOLUTION RESPIRATORY (INHALATION) at 09:12

## 2021-12-28 RX ADMIN — APIXABAN 5 MG: 5 TABLET, FILM COATED ORAL at 09:12

## 2021-12-28 RX ADMIN — SILODOSIN 4 MG: 4 CAPSULE ORAL at 09:12

## 2021-12-28 RX ADMIN — IPRATROPIUM BROMIDE AND ALBUTEROL SULFATE 3 ML: 2.5; .5 SOLUTION RESPIRATORY (INHALATION) at 04:12

## 2021-12-28 RX ADMIN — AMLODIPINE BESYLATE 10 MG: 10 TABLET ORAL at 09:12

## 2021-12-28 RX ADMIN — Medication 10 ML: at 12:12

## 2021-12-28 RX ADMIN — Medication 10 ML: at 06:12

## 2021-12-28 RX ADMIN — SODIUM CHLORIDE 30 MG/ML INHALATION SOLUTION 4 ML: 30 SOLUTION INHALANT at 04:12

## 2021-12-28 RX ADMIN — MYCOPHENOLATE MOFETIL 500 MG: 200 POWDER, FOR SUSPENSION ORAL at 10:12

## 2021-12-28 RX ADMIN — MONTELUKAST 10 MG: 10 TABLET, FILM COATED ORAL at 09:12

## 2021-12-28 RX ADMIN — MYCOPHENOLATE MOFETIL 500 MG: 200 POWDER, FOR SUSPENSION ORAL at 09:12

## 2021-12-28 RX ADMIN — SENNOSIDES AND DOCUSATE SODIUM 1 TABLET: 50; 8.6 TABLET ORAL at 09:12

## 2021-12-28 RX ADMIN — INSULIN ASPART 1 UNITS: 100 INJECTION, SOLUTION INTRAVENOUS; SUBCUTANEOUS at 10:12

## 2021-12-28 RX ADMIN — SODIUM CHLORIDE 30 MG/ML INHALATION SOLUTION 4 ML: 30 SOLUTION INHALANT at 09:12

## 2021-12-28 RX ADMIN — APIXABAN 5 MG: 5 TABLET, FILM COATED ORAL at 10:12

## 2021-12-28 RX ADMIN — ACETYLCYSTEINE 4 ML: 100 INHALANT RESPIRATORY (INHALATION) at 04:12

## 2021-12-28 RX ADMIN — ACETYLCYSTEINE 4 ML: 100 INHALANT RESPIRATORY (INHALATION) at 01:12

## 2021-12-28 RX ADMIN — Medication 12 MG: at 09:12

## 2021-12-28 RX ADMIN — IPRATROPIUM BROMIDE AND ALBUTEROL SULFATE 3 ML: 2.5; .5 SOLUTION RESPIRATORY (INHALATION) at 08:12

## 2021-12-28 RX ADMIN — SODIUM CHLORIDE 30 MG/ML INHALATION SOLUTION 4 ML: 30 SOLUTION INHALANT at 11:12

## 2021-12-28 RX ADMIN — FAMOTIDINE 20 MG: 20 TABLET ORAL at 09:12

## 2021-12-28 NOTE — PLAN OF CARE
Recommendations    1. Continue regular diet as tolerated with texture per SLP.     2. Add Boost Plus BID (chocolate or vanilla) to aid in caloric intake.     3. RD following.     Goals: consume >50% of meals by RD follow-up  Nutrition Goal Status: new

## 2021-12-28 NOTE — ASSESSMENT & PLAN NOTE
An 86F with on eliquis s/p head trauma 1 week prior now with small aSDH and tSAH and AMS c/b PEA arrest 12/20    Continue ICU care   Neurocheck per protocol   Work up:  --CTH: R temp/paretial aSDH 5-6mm thickness, 3-5mm MLS, stable on repeat as well as imaging post PEA arrest  --Follow up CT stable  Follow up head CT on Monday  Continue holding eliquis  AED per Epilepsy  SBP<150  Na >135  Eliquis to be resumed today.  Please obtain CT head once therapeutic.  Medical management per NCC  Plan for TTF to NSGY stable per NCC  Please page NSGY immediately for any changes in neuro status

## 2021-12-28 NOTE — PROGRESS NOTES
[facility]  Neurocritical Care  Progress Note    Admit Date: 12/13/2021  Service Date: 12/28/2021  Length of Stay: 15    Subjective:     Chief Complaint: SDH (subdural hematoma)    History of Present Illness:   The patient is an 86-year-old female with past medical history of TIA, RA and Afib of Eliquis admitted to [facility] with Right SDH.  She was found on the floor this morning only responding to painful stimuli.  Patient occasionally says her name.GCS 12 noted for EMS.  PCC administered. She had a mechanical fall out of bed on Thursday hitting her head and has been complaining of intermittent headaches since. Her last dose of eliquis was on 12/11. Pending repeat CT head. NSGY consulted. In addition patient was found to be hyponatremic at 118-central line placed and hypertonic saline infusion initiated. Pending repeat CT head. Patient admitted to [facility] for close monitoring and higher level of care.       Hospital Course: 12/13/2021: patient admitted to [facility] s/p fall on 12/11 on Eliquis  12/14/2021 wheezing, prolonged expiratory phase.   12/15/2021 copious respiratory secretions. Remains encephalopathic. Review EEG  12/16/2021 improved respiratory secretions. D/c nasal tracheal airway if minimal secretions.   12/17/2021 restless.   12/18/2021 lethargic, rising BUN/Cr ratio. Fluid resuscitation. Received flumazenil for BDZ   12/19/2021 encephalopathy overnight. Work up neg so far. BUN/Cr >20 from volume contraction.   12/20/2021 left facial twitching overnight. Loaded with vimpat.   12/21/2021 event overnight cardiac arrest/PEA likely related to respiratory distress. Intubated.   12/22/2021 Add precedex to enable vent weaning.  12/23/2021   extubation trial today  12/24/2021 extubated over cook catheter due to concern for laryngeal edema. Successfully extubated this am. Close monitoring.   12/25/2021 agitated delirium overnight.   12/26/2021 increase cough assist frequency.   12/27: pulmonary toilette,  "prednisone, daughter updated at bedside, change diet to puree  12/28: resume home apixaban, rescan head tonight, continue pulmonary toilette, can probably step down in AM, melatonin at 22 hr    Review of Symptoms:   Unable to fully obtain due to neuro status, denies pain, perseverates on food, repeats same questions     Physical Exam:  GA: Alert, comfortable, no acute distress.   HEENT: No scleral icterus or JVD.   Pulmonary: Clear to auscultation A/L. No wheezing, crackles, or rhonchi.  Cardiac: RRR S1 & S2 w/o rubs/murmurs/gallops.   Abdominal: Bowel sounds present x 4. No appreciable hepatosplenomegaly.  Skin: No jaundice, rashes, or visible lesions.  Pulses: 2+ DP bilat     Neuro:  --sedation: none  --GCS: [id number]  --Mental Status: awake, follows simple commands, when asked the year she states "22...12..[birth year]" and perseverates on this, does not dose off this morning during assessment, improved from yesterday  --CN II-XII grossly intact.   --Pupils brisk bilat  --brainstem: intact  --Motor: 4/5 throughout  --sensory: intact to soft touch and pain throughout  --Reflexes: not tested  --Gait: deferred    No results for input(s): WBC, RBC, HGB, HCT, PLT, MCV, MCH, MCHC in the last 24 hours.  No results for input(s): GLUCOSE, CALCIUM, PROT, NA, K, CO2, CL, BUN, CREATININE, ALKPHOS, ALT, AST, BILITOT in the last 24 hours.    Invalid input(s):  ALBUMIN  No results for input(s): PT, INR, APTT in the last 24 hours.       Temp: 98 °F (36.7 °C)  Pulse: (!) 114  Rhythm: sinus tachycardia  BP: (!) 143/70  MAP (mmHg): 99  Resp: (!) 26  SpO2: 100 %  O2 Device (Oxygen Therapy): nasal cannula w/ humidification    Temp  Min: 97.4 °F (36.3 °C)  Max: 98.9 °F (37.2 °C)  Pulse  Min: 106  Max: 124  BP  Min: 107/66  Max: 153/74  MAP (mmHg)  Min: 77  Max: 107  Resp  Min: 20  Max: 51  SpO2  Min: 95 %  Max: 100 %    12/27 0701 - 12/28 0700  In: 685.8 [P.O.:180; I.V.:60]  Out: 450 [Urine:450]   Unmeasured Output  Urine Occurrence: 1  Stool " Occurrence: 1  Pad Count: 1    Nutrition Prescription Ordered  Current Diet Order: NPO  Current Nutrition Support Formula Ordered: Isosource 1.5  Current Nutrition Support Rate Ordered: 10 (ml)  Current Nutrition Support Frequency Ordered: mL/hr  Last Bowel Movement: 12/28/21    Body mass index is 25.35 kg/m².      I have personally reviewed all labs, imaging, and studies today    Scheduled Meds:   acetylcysteine 100 mg/ml (10%)  4 mL Nebulization QID WAKE    albuterol-ipratropium  3 mL Nebulization Q4H    amLODIPine  10 mg Oral Daily    apixaban  5 mg Oral BID    atorvastatin  40 mg Oral Daily    famotidine  20 mg Oral Daily    hydrOXYchloroQUINE  200 mg Oral BID    lacosamide  100 mg Oral Q12H    montelukast  10 mg Oral Daily    mycophenolate mofetil  500 mg Oral BID    predniSONE  30 mg Oral Daily    senna-docusate 8.6-50 mg  1 tablet Oral Daily    silodosin  4 mg Oral Daily    sodium chloride 0.9%  10 mL Intravenous Q6H    sodium chloride 3%  4 mL Nebulization Q4H     Continuous Infusions:  PRN Meds:.acetaminophen, albuterol-ipratropium, bisacodyL, calcium gluconate IVPB, calcium gluconate IVPB, calcium gluconate IVPB, dextrose 50%, glucagon (human recombinant), insulin aspart U-100, labetalol, magnesium sulfate IVPB, magnesium sulfate IVPB, melatonin, ondansetron, potassium chloride in water **AND** potassium chloride in water **AND** potassium chloride in water, racepinephrine, Flushing PICC Protocol **AND** sodium chloride 0.9% **AND** sodium chloride 0.9%    Assessment/Plan:     Neuro  * SDH (subdural hematoma)  -SDH with brain compression  -non surgical  -repeat imaging reviewed  - was on apixaban and reversed with PCC on admission  - can resume apixaban per NSGY, rescan tonight    Encephalopathy  Improving this morning  Continues to perseverate on the dates  Encourage sleep at night and awake during the day  Delirium precautions  Melatonin at night no earlier than 21:00  Prn  quetiapine      Cardiac/Vascular  Cardiac arrest  Respiratory arrest   Now post extubation    Renal/  Hyponatremia  -diet  -prn fluids      Orthopedic  RA (rheumatoid arthritis)  --continue mycophenolate and hydroxychloroquine  --continue steroids  --will switch to prednisone as she was on at home, family requesting  --30mg so higher than home dose  --monitor for side effects          The patient is being Prophylaxed for:  Venous Thromboembolism with: Mechanical  Stress Ulcer with: H2B  Ventilator Pneumonia with: not applicable    Activity Orders          Diet Dysphagia Pureed (IDDSI Level 4) Thin: Dysphagia 1 (Pureed) starting at 12/27 0927    Turn patient starting at 12/19 1423        Full Code    Carson Kwan MD  Neurocritical Care  [facility] - Neuro Critical Care    Level III

## 2021-12-28 NOTE — ASSESSMENT & PLAN NOTE
-SDH with brain compression  -non surgical  -repeat imaging reviewed  - was on apixaban and reversed with PCC on admission  - can resume apixaban per TAMELA, rescan tonight

## 2021-12-28 NOTE — PLAN OF CARE
[facility] Care Plan    POC reviewed with Selma Lux and family at 0300. Pt verbalized understanding. Questions and concerns addressed. No acute events overnight. Pt progressing toward goals. Will continue to monitor. See below and flowsheets for full assessment and VS info.       Neuro:  [place] Coma Scale  Best Eye Response: 3-->(E3) to speech  Best Motor Response: 6-->(M6) obeys commands  Best Verbal Response: 5-->(V5) oriented  [place] Coma Scale Score: 14  Assessment Qualifiers: patient not sedated/intubated  Pupil PERRLA: yes     24hr Temp:  [97.4 °F (36.3 °C)-98.9 °F (37.2 °C)]     CV:   Rhythm: sinus tachycardia  BP goals:   SBP < 160  MAP > 65    Resp:   O2 Device (Oxygen Therapy): nasal cannula w/ humidification  Vent Mode: Spont  Set Rate: 8 BPM  Oxygen Concentration (%): 30  Vt Set: 450 mL  PEEP/CPAP: 5 cmH20  Pressure Support: 10 cmH20    Plan: N/A    GI/:  SCOOBY Total Score: 20  Diet/Nutrition Received: mechanical/dental soft  Last Bowel Movement: 12/28/21  Voiding Characteristics: unable to void    Intake/Output Summary (Last 24 hours) at 12/28/2021 0640  Last data filed at 12/27/2021 2205  Gross per 24 hour   Intake 685.84 ml   Output 450 ml   Net 235.84 ml     Unmeasured Output  Urine Occurrence: 1  Stool Occurrence: 1  Pad Count: 1    Labs/Accuchecks:  Recent Labs   Lab 12/27/21  0953   WBC 22.18*   RBC 3.42*   HGB 9.7*   HCT 33.2*   *      Recent Labs   Lab 12/26/21  0028      K 3.7   CO2 21*      BUN 29*   CREATININE 0.7   ALKPHOS 57   ALT 30   AST 26   BILITOT 1.5*    No results for input(s): PROTIME, INR, APTT, HEPANTIXA in the last 168 hours. No results for input(s): CPK, CPKMB, TROPONINI, MB in the last 168 hours.    Electrolytes: N/A - electrolytes WDL  Accuchecks: ACHS    Gtts:       LDA/Wounds:  Lines/Drains/Airways       Peripherally Inserted Central Catheter Line              PICC Double Lumen 12/15/21 1616 right basilic 12 days              Airway                 Airway  Anesthesia 12/21/21 7 days                  Wounds: No  Wound care consulted: No

## 2021-12-28 NOTE — ASSESSMENT & PLAN NOTE
Improving this morning  Continues to perseverate on the dates  Encourage sleep at night and awake during the day  Delirium precautions  Melatonin at night no earlier than 21:00  Prn quetiapine

## 2021-12-28 NOTE — SUBJECTIVE & OBJECTIVE
Interval History:  NAEON. Exam stable. Higher level confusion.    Medications:  Continuous Infusions:    Scheduled Meds:   acetylcysteine 100 mg/ml (10%)  4 mL Nebulization QID WAKE    albuterol-ipratropium  3 mL Nebulization Q4H    amLODIPine  10 mg Oral Daily    apixaban  5 mg Oral BID    atorvastatin  40 mg Oral Daily    famotidine  20 mg Oral Daily    hydrOXYchloroQUINE  200 mg Oral BID    lacosamide  100 mg Oral Q12H    montelukast  10 mg Oral Daily    mycophenolate mofetil  500 mg Oral BID    predniSONE  30 mg Oral Daily    senna-docusate 8.6-50 mg  1 tablet Oral Daily    silodosin  4 mg Oral Daily    sodium chloride 0.9%  10 mL Intravenous Q6H    sodium chloride 3%  4 mL Nebulization Q4H     PRN Meds:acetaminophen, albuterol-ipratropium, bisacodyL, calcium gluconate IVPB, calcium gluconate IVPB, calcium gluconate IVPB, dextrose 50%, glucagon (human recombinant), insulin aspart U-100, labetalol, magnesium sulfate IVPB, magnesium sulfate IVPB, melatonin, ondansetron, potassium chloride in water **AND** potassium chloride in water **AND** potassium chloride in water, racepinephrine, Flushing PICC Protocol **AND** sodium chloride 0.9% **AND** sodium chloride 0.9%       Objective:     Weight: 67 kg (147 lb 11.3 oz)  Body mass index is 25.35 kg/m².  Vital Signs (Most Recent):  Temp: 98.2 °F (36.8 °C) (12/28/21 0705)  Pulse: (!) 115 (12/28/21 1005)  Resp: (!) 37 (12/28/21 1005)  BP: 130/61 (12/28/21 1005)  SpO2: 95 % (12/28/21 1005) Vital Signs (24h Range):  Temp:  [97.8 °F (36.6 °C)-98.9 °F (37.2 °C)] 98.2 °F (36.8 °C)  Pulse:  [106-124] 115  Resp:  [20-51] 37  SpO2:  [95 %-100 %] 95 %  BP: (107-153)/(53-74) 130/61              NG/OG Tube 12/17/21 1405 Left nostril (Active)   Placement Check placement verified by aspirate characteristics 12/22/21 0305   Tolerance no signs/symptoms of discomfort 12/22/21 0305   Securement secured to nostril center 12/22/21 0305   Clamp Status/Tolerance no abdominal  "distention;no abdominal discomfort;no emesis;no nausea;no residual;no restlessness 12/22/21 0305   Suction Setting/Drainage Method suction at the bedside 12/22/21 0305   Insertion Site Appearance no redness, warmth, tenderness, skin breakdown, drainage 12/22/21 0305   Flush/Irrigation flushed w/;water;no resistance met 12/22/21 0305   Feeding Type continuous;by pump 12/22/21 0305   Feeding Action feeding continued 12/22/21 0305   Current Rate (mL/hr) 10 mL/hr 12/21/21 1905   Goal Rate (mL/hr) 10 mL/hr 12/21/21 1905   Intake (mL) 100 mL 12/20/21 0800   Water Bolus (mL) 200 mL 12/21/21 1600   Rate Formula Tube Feeding (mL/hr) 10 mL/hr 12/21/21 1905   Formula Name Isosource 12/22/21 0305   Intake (mL) - Formula Tube Feeding 20 12/21/21 1800   Residual Amount (ml) 0 ml 12/21/21 1905            Urethral Catheter 12/21/21 0300 Temperature probe 16 Fr. (Active)   Site Assessment Clean;Intact 12/22/21 0305   Collection Container Urimeter 12/22/21 0305   Securement Method secured to top of thigh w/ adhesive device 12/22/21 0305   Catheter Care Performed yes 12/22/21 0305   Reason for Continuing Urinary Catheterization Critically ill in ICU and requiring hourly monitoring of intake/output 12/22/21 0305   CAUTI Prevention Bundle StatLock in place w 1" slack;Intact seal between catheter & drainage tubing;Drainage bag/urimeter off the floor;Sheeting clip in use;No dependent loops or kinks;Drainage bag/urimeter not overfilled (<2/3 full);Drainage bag/urimeter below bladder 12/21/21 1905   Output (mL) 20 mL 12/22/21 0700     Physical Exam    AAOx3, PERRL, EOMI, FS, TM, 5/5 throughout, SILT.  Abdomen soft  No resp distress  Extremities intact      Significant Labs:  No results for input(s): GLU, NA, K, CL, CO2, BUN, CREATININE, CALCIUM, MG in the last 48 hours.  Recent Labs   Lab 12/27/21  0953   WBC 22.18*   HGB 9.7*   HCT 33.2*   *     No results for input(s): LABPT, INR, APTT in the last 48 hours.  Microbiology Results " (last 7 days)     Procedure Component Value Units Date/Time    Blood culture [[id number]] Collected: 12/21/21 1032    Order Status: Completed Specimen: Blood from Peripheral, Antecubital, Right Updated: 12/26/21 1212     Blood Culture, Routine No growth after 5 days.    Narrative:      Blood cultures x 2 different sites. 4 bottles total. Please  draw cultures before administering antibiotics.    Blood culture [[id number]] Collected: 12/21/21 1010    Order Status: Completed Specimen: Blood from Peripheral, Forearm, Right Updated: 12/26/21 1212     Blood Culture, Routine No growth after 5 days.    Narrative:      Blood cultures from 2 different sites. 4 bottles total.  Please draw before starting antibiotics.    Culture, Respiratory with Gram Stain [[id number]]  (Abnormal)  (Susceptibility) Collected: 12/21/21 1015    Order Status: Completed Specimen: Respiratory from Tracheal Aspirate Updated: 12/24/21 1001     Respiratory Culture No S aureus or Pseudomonas isolated.      ESCHERICHIA COLI  Few       Gram Stain (Respiratory) <10 epithelial cells per low power field.     Gram Stain (Respiratory) Many WBC's     Gram Stain (Respiratory) Few Gram positive cocci        All pertinent labs from the last 24 hours have been reviewed.    Significant Diagnostics:  I have reviewed and interpreted all pertinent imaging results/findings within the past 24 hours.

## 2021-12-28 NOTE — PROGRESS NOTES
[facility]  Neurosurgery  Progress Note    Subjective:     History of Present Illness: 86-year-old female with past medical history of TIA comes to the emergency department for AMS.  She was found on the floor about 1 hour ago only responding to painful stimuli.  Patient occasionally says her name.GCS 12 noted for EMS.  Patient does take blood thinners. She had a mechanical fall out of bed on Thursday hitting her head and has been complaining of intermittent headaches since. Her last dose of eliquis was on 12/11. NSGY consulted for CTH with SDH      Post-Op Info:  * No surgery found *         Interval History:  NAEON. Exam stable. Higher level confusion.    Medications:  Continuous Infusions:    Scheduled Meds:   acetylcysteine 100 mg/ml (10%)  4 mL Nebulization QID WAKE    albuterol-ipratropium  3 mL Nebulization Q4H    amLODIPine  10 mg Oral Daily    apixaban  5 mg Oral BID    atorvastatin  40 mg Oral Daily    famotidine  20 mg Oral Daily    hydrOXYchloroQUINE  200 mg Oral BID    lacosamide  100 mg Oral Q12H    montelukast  10 mg Oral Daily    mycophenolate mofetil  500 mg Oral BID    predniSONE  30 mg Oral Daily    senna-docusate 8.6-50 mg  1 tablet Oral Daily    silodosin  4 mg Oral Daily    sodium chloride 0.9%  10 mL Intravenous Q6H    sodium chloride 3%  4 mL Nebulization Q4H     PRN Meds:acetaminophen, albuterol-ipratropium, bisacodyL, calcium gluconate IVPB, calcium gluconate IVPB, calcium gluconate IVPB, dextrose 50%, glucagon (human recombinant), insulin aspart U-100, labetalol, magnesium sulfate IVPB, magnesium sulfate IVPB, melatonin, ondansetron, potassium chloride in water **AND** potassium chloride in water **AND** potassium chloride in water, racepinephrine, Flushing PICC Protocol **AND** sodium chloride 0.9% **AND** sodium chloride 0.9%       Objective:     Weight: 67 kg (147 lb 11.3 oz)  Body mass index is 25.35 kg/m².  Vital Signs (Most Recent):  Temp: 98.2 °F  "(36.8 °C) (12/28/21 0705)  Pulse: (!) 115 (12/28/21 1005)  Resp: (!) 37 (12/28/21 1005)  BP: 130/61 (12/28/21 1005)  SpO2: 95 % (12/28/21 1005) Vital Signs (24h Range):  Temp:  [97.8 °F (36.6 °C)-98.9 °F (37.2 °C)] 98.2 °F (36.8 °C)  Pulse:  [106-124] 115  Resp:  [20-51] 37  SpO2:  [95 %-100 %] 95 %  BP: (107-153)/(53-74) 130/61              NG/OG Tube 12/17/21 1405 Left nostril (Active)   Placement Check placement verified by aspirate characteristics 12/22/21 0305   Tolerance no signs/symptoms of discomfort 12/22/21 0305   Securement secured to nostril center 12/22/21 0305   Clamp Status/Tolerance no abdominal distention;no abdominal discomfort;no emesis;no nausea;no residual;no restlessness 12/22/21 0305   Suction Setting/Drainage Method suction at the bedside 12/22/21 0305   Insertion Site Appearance no redness, warmth, tenderness, skin breakdown, drainage 12/22/21 0305   Flush/Irrigation flushed w/;water;no resistance met 12/22/21 0305   Feeding Type continuous;by pump 12/22/21 0305   Feeding Action feeding continued 12/22/21 0305   Current Rate (mL/hr) 10 mL/hr 12/21/21 1905   Goal Rate (mL/hr) 10 mL/hr 12/21/21 1905   Intake (mL) 100 mL 12/20/21 0800   Water Bolus (mL) 200 mL 12/21/21 1600   Rate Formula Tube Feeding (mL/hr) 10 mL/hr 12/21/21 1905   Formula Name Isosource 12/22/21 0305   Intake (mL) - Formula Tube Feeding 20 12/21/21 1800   Residual Amount (ml) 0 ml 12/21/21 1905            Urethral Catheter 12/21/21 0300 Temperature probe 16 Fr. (Active)   Site Assessment Clean;Intact 12/22/21 0305   Collection Container Urimeter 12/22/21 0305   Securement Method secured to top of thigh w/ adhesive device 12/22/21 0305   Catheter Care Performed yes 12/22/21 0305   Reason for Continuing Urinary Catheterization Critically ill in ICU and requiring hourly monitoring of intake/output 12/22/21 0305   CAUTI Prevention Bundle StatLock in place w 1" slack;Intact seal between catheter & drainage tubing;Drainage " bag/urimeter off the floor;Sheeting clip in use;No dependent loops or kinks;Drainage bag/urimeter not overfilled (<2/3 full);Drainage bag/urimeter below bladder 12/21/21 1905   Output (mL) 20 mL 12/22/21 0700     Physical Exam    AAOx3, PERRL, EOMI, FS, TM, 5/5 throughout, SILT.  Abdomen soft  No resp distress  Extremities intact      Significant Labs:  No results for input(s): GLU, NA, K, CL, CO2, BUN, CREATININE, CALCIUM, MG in the last 48 hours.  Recent Labs   Lab 12/27/21  0953   WBC 22.18*   HGB 9.7*   HCT 33.2*   *     No results for input(s): LABPT, INR, APTT in the last 48 hours.  Microbiology Results (last 7 days)     Procedure Component Value Units Date/Time    Blood culture [[id number]] Collected: 12/21/21 1032    Order Status: Completed Specimen: Blood from Peripheral, Antecubital, Right Updated: 12/26/21 1212     Blood Culture, Routine No growth after 5 days.    Narrative:      Blood cultures x 2 different sites. 4 bottles total. Please  draw cultures before administering antibiotics.    Blood culture [[id number]] Collected: 12/21/21 1010    Order Status: Completed Specimen: Blood from Peripheral, Forearm, Right Updated: 12/26/21 1212     Blood Culture, Routine No growth after 5 days.    Narrative:      Blood cultures from 2 different sites. 4 bottles total.  Please draw before starting antibiotics.    Culture, Respiratory with Gram Stain [[id number]]  (Abnormal)  (Susceptibility) Collected: 12/21/21 1015    Order Status: Completed Specimen: Respiratory from Tracheal Aspirate Updated: 12/24/21 1001     Respiratory Culture No S aureus or Pseudomonas isolated.      ESCHERICHIA COLI  Few       Gram Stain (Respiratory) <10 epithelial cells per low power field.     Gram Stain (Respiratory) Many WBC's     Gram Stain (Respiratory) Few Gram positive cocci        All pertinent labs from the last 24 hours have been reviewed.    Significant Diagnostics:  I have reviewed and interpreted all pertinent imaging  results/findings within the past 24 hours.    Assessment/Plan:     * SDH (subdural hematoma)  An 86F with on eliquis s/p head trauma 1 week prior now with small aSDH and tSAH and AMS c/b PEA arrest 12/20    Continue ICU care   Neurocheck per protocol   Work up:  --CTH: R temp/paretial aSDH 5-6mm thickness, 3-5mm MLS, stable on repeat as well as imaging post PEA arrest  --Follow up CT stable  Follow up head CT on Monday  Continue holding eliquis  AED per Epilepsy  SBP<150  Na >135  Eliquis to be resumed today.  Please obtain CT head once therapeutic.  Medical management per NCC  Plan for TTF to NSGY stable per NCC  Please page NSGY immediately for any changes in neuro status          Frank Gonzalez MD  Neurosurgery  [facility]

## 2021-12-28 NOTE — PLAN OF CARE
[facility] Care Plan    POC reviewed with Selma Lux and family at 1400. Pt verbalized understanding. Questions and concerns addressed. No acute events today. Pt progressing toward goals. Will continue to monitor. See below and flowsheets for full assessment and VS info.     Cleared per SLP to have pureed diet.  Low urine amount in bladder-  ml bolus given.      Neuro:  [place] Coma Scale  Best Eye Response: 4-->(E4) spontaneous  Best Motor Response: 6-->(M6) obeys commands  Best Verbal Response: 5-->(V5) oriented  [place] Coma Scale Score: 15  Assessment Qualifiers: patient not sedated/intubated  Pupil PERRLA: yes     24 hr Temp:  [97.4 °F (36.3 °C)-98.9 °F (37.2 °C)]     CV:   Rhythm: sinus tachycardia  BP goals:   SBP < 160  MAP > 65    Resp:   O2 Device (Oxygen Therapy): nasal cannula w/ humidification  Vent Mode: Spont  Set Rate: 8 BPM  Oxygen Concentration (%): 30  Vt Set: 450 mL  PEEP/CPAP: 5 cmH20  Pressure Support: 10 cmH20    Plan: N/A    GI/:  SCOOBY Total Score: 20  Diet/Nutrition Received: NPO  Last Bowel Movement: 12/27/21  Voiding Characteristics: unable to void    Intake/Output Summary (Last 24 hours) at 12/27/2021 1830  Last data filed at 12/27/2021 1735  Gross per 24 hour   Intake 602.09 ml   Output 500 ml   Net 102.09 ml     Unmeasured Output  Urine Occurrence: 0  Stool Occurrence: 0  Pad Count: 1    Labs/Accuchecks:  Recent Labs   Lab 12/27/21  0953   WBC 22.18*   RBC 3.42*   HGB 9.7*   HCT 33.2*   *      Recent Labs   Lab 12/26/21  0028      K 3.7   CO2 21*      BUN 29*   CREATININE 0.7   ALKPHOS 57   ALT 30   AST 26   BILITOT 1.5*      Recent Labs   Lab 12/21/21  0255   INR 1.1   APTT 26.2    No results for input(s): CPK, CPKMB, TROPONINI, MB in the last 168 hours.    Electrolytes: N/A - electrolytes WDL  Accuchecks: ACHS    Gtts:       LDA/Wounds:  Lines/Drains/Airways       Peripherally Inserted Central Catheter Line              PICC Double Lumen 12/15/21 1616 right  basilic 12 days              Airway                 Airway Anesthesia 12/21/21 6 days                  Wounds: No  Wound care consulted: No

## 2021-12-28 NOTE — PROGRESS NOTES
[facility]  Adult Nutrition  Progress Note    SUMMARY       Recommendations    1. Continue regular diet as tolerated with texture per SLP.     2. Add Boost Plus BID (chocolate or vanilla) to aid in caloric intake.     3. RD following.     Goals: consume >50% of meals by RD follow-up  Nutrition Goal Status: new  Communication of RD Recs:  (POC)    Assessment and Plan    Nutrition Problem  Inadequate energy intake     Related to (etiology):   Inability to consume sufficient energy     Signs and Symptoms (as evidenced by):   NPO with no alternate means of nutrition     Interventions (treatment strategy):  Collaboration with other providers  Enteral nutrition     Nutrition Diagnosis Status:   Improving        Malnutrition Assessment                 Orbital Region (Subcutaneous Fat Loss): well nourished  Upper Arm Region (Subcutaneous Fat Loss): mild depletion   Religion Region (Muscle Loss): well nourished  Clavicle Bone Region (Muscle Loss): well nourished  Clavicle and Acromion Bone Region (Muscle Loss): well nourished  Scapular Bone Region (Muscle Loss): well nourished  Dorsal Hand (Muscle Loss): well nourished   Edema (Fluid Accumulation): 2-->mild   Subcutaneous Fat Loss (Final Summary): well nourished  Muscle Loss Evaluation (Final Summary): well nourished         Reason for Assessment    Reason For Assessment: RD follow-up  Diagnosis:  (SDH)  Relevant Medical History: TIA, RA  Interdisciplinary Rounds: did not attend  General Information Comments: Pt extubated, diet advanced to pureed. Pt sleeping at time of visit. Daughter at bedside reports pt consuming ~50% of meals. Pt drinks ONS at home. NFPE completed 12/17; pt with some mild age-appropriate wasting. Pt at risk for acute malnutrition. Will continue to monitor energy intake and wt changes.  Nutrition Discharge Planning: pending medical course    Nutrition Risk Screen    Nutrition Risk Screen: dysphagia or difficulty  "swallowing    Nutrition/Diet History    Patient Reported Diet/Restrictions/Preferences: general  Spiritual, Cultural Beliefs, Mu-ism Practices, Values that Affect Care: no  Food Allergies: NKFA  Factors Affecting Nutritional Intake: decreased appetite    Anthropometrics    Temp: 98.2 °F (36.8 °C)  Height Method: Estimated  Height: 5' 4" (162.6 cm)  Height (inches): 64 in  Weight Method: Bed Scale  Weight: 67 kg (147 lb 11.3 oz)  Weight (lb): 147.71 lb  Ideal Body Weight (IBW), Female: 120 lb  % Ideal Body Weight, Female (lb): 123.09 %  BMI (Calculated): 25.3  BMI Grade: 25 - 29.9 - overweight     Lab/Procedures/Meds    Pertinent Labs Reviewed: reviewed  Pertinent Labs Comments: BUN 29, glucose 57, T bili 1.5  Pertinent Medications Reviewed: reviewed  Pertinent Medications Comments: amlodipine, statin, montelukast, prednisone, senna-docusate     Estimated/Assessed Needs    Weight Used For Calorie Calculations: 67 kg (147 lb 11.3 oz)  Energy Calorie Requirements (kcal): 1675 kcal/day  Energy Need Method: Kcal/kg (25)  Protein Requirements:  gm (1.2-1.5 gm/kg)  Weight Used For Protein Calculations: 67 kg (147 lb 11.3 oz)  Fluid Requirements (mL): 1 mL/kcal or per MD  Estimated Fluid Requirement Method: RDA Method  RDA Method (mL): 1675     Nutrition Prescription Ordered    Current Diet Order: Pureed    Evaluation of Received Nutrient/Fluid Intake    I/O: -1.324L since 12/14  Comments: LBM 12/28  Tolerance: tolerating  % Intake of Estimated Energy Needs: Other: ~50%  % Meal Intake: Other: ~50%    Nutrition Risk    Level of Risk/Frequency of Follow-up: low     Monitor and Evaluation    Food and Nutrient Intake: energy intake,food and beverage intake  Food and Nutrient Adminstration: diet order  Knowledge/Beliefs/Attitudes: food and nutrition knowledge/skill  Physical Activity and Function: nutrition-related ADLs and IADLs  Anthropometric Measurements: weight,weight change,body mass index  Biochemical Data, " Medical Tests and Procedures: gastrointestinal profile,electrolyte and renal panel,glucose/endocrine profile,inflammatory profile,lipid profile  Nutrition-Focused Physical Findings: overall appearance,extremities, muscles and bones     Nutrition Follow-Up    RD Follow-up?: Yes

## 2021-12-28 NOTE — PT/OT/SLP PROGRESS
"Speech Language Pathology Treatment    Patient Name:  Selma Lux   MRN:  [id number]  Admitting Diagnosis: SDH (subdural hematoma)    Recommendations:                 Recommendations:                General Recommendations:  Dysphagia therapy, Speech/language therapy and Cognitive-linguistic therapy  Diet recommendations:  Puree, Thin   Aspiration Precautions:   · 1 small bite/sip at a time,   · 1:1 assistance with meals to ensure following safe swallowing precautions,   · HOB to 90 degrees,   · Meds crushed in puree and   · Strict aspiration precautions   Per Prior MBSS completed in June 2021- Aspiration Precautions:   1. Small 3-5 ml sips of thin liquids with chin tuck head posture  2. 2 swallows per sip of liquid  3. Small bites of food with chin in neutral to tucked position  4. 3-4 swallows per bite of food  5. Controlled oral swallow   General Precautions: Standard, aspiration,fall,pureed diet,seizure  Communication strategies:  none    Subjective     "I think I am going to throw up."     Nurse cleared for session.  Pt seen over course of two sessions 2/2 pt requesting to use the bathroom during first session.      Pain/Comfort:  · Pain Rating 1: 0/10  · Pain Rating Post-Intervention 1: 0/10    Respiratory Status: Nasal cannula    Objective:     Has the patient been evaluated by SLP for swallowing?   Yes  Keep patient NPO? No     Pt seen bedside with daughter present for both sessions.  Daughter restated that pt had a modified barium swallow study earlier this year 2/2 concern for thyroid mass impeding swallow.  She stated that the ENT the patient saw at that time did not agree with the SLP's recs for compensatory strategies and denied pt required outpatient SLP services.  She states they did implement all other swallow precs that had been provided over the years including upright posturing and somewhat slow rate of feeding.  HOB raised upright for safety with po trials.  Oral care provided and moisturizer " applied.  Pt noted to have intermittent dry cough prior to po trials.  Thin liquids presented via tsp x 4.  Dry cough noted following 3/4 trials.  Pt continued to attempt to clear mucous though was unsuccessful and requested to use the bathroom.  Nursing alerted and session discontinued.  SLP returned to room.  No noticeable dry cough observed. Thin liquids again presented via tsp x2 with dry cough elicited following the swallow.  Overall pt's swallow appeared timely with multiple swallows elicited.  Pt continued to cough with eventually expectoration of sticky phlegm.  Expectoration caused pt to gag with subsequent regurgitation after a few minutes of yellow liquid.  Liquid wash and oral care provided.  Pt then tolerated 1 sip of thin via spoon without overt s/s aspiration.  Additional trials held 2/2 nausea reported by pt.  Education re: ongoing diet recs, swallow precs, s/s aspiration, and POC reviewed with pt and daughter.  Daughter verbalized understanding.  Pt appeared to have fallen asleep.  Daughter reports pt has been receiving breathing treatments prior to meals and she had not noted any coughing during meals.  SLP to continue to follow.     Assessment:     Selma Lux is a 86 y.o. female with an SLP diagnosis of Dysphagia and Cognitive-Linguistic Impairment.     Goals:   Multidisciplinary Problems     SLP Goals        Problem: SLP Goal    Goal Priority Disciplines Outcome   SLP Goal     SLP Ongoing, Progressing   Description: Speech Language Pathology Goals  Goals expected to be met by 1/8:  1. Pt will participate in an ongoing assessment to determine the least restrictive and safest diet with possible updated goals to follow pending results.  2. Pt will participate in a speech, language, and cognitive evaluation with possible updated goals to follow pending results.  2. Pt will attend to therapy tasks for 5+ minutes given 1 redirection across 2 therapy sessions.   3. Pt will complete complex  comprehension tasks with 90% accy given min cues.   4. Pt will name 10 items in a concrete category given min cues and no time constraints across 2 categories.   5. Pt will follow complex directions with 75% acc given 1 repetition.   6. Pt will answer temporal orientation questions with 90% acc no cues.                       Plan:     · Patient to be seen:  4 x/week   · Plan of Care expires:  01/14/21  · Plan of Care reviewed with:  patient,daughter   · SLP Follow-Up:  Yes       Discharge recommendations:  rehabilitation facility   Barriers to Discharge:  Level of Skilled Assistance Needed  \    Time Tracking:     SLP Treatment Date:   12/28/21  Speech Start Time:  0753 (845)  Speech Stop Time:  0806 (903)     Speech Total Time (min):  13 min + 18 mins= 31 min    Billable Minutes: Treatment Swallowing Dysfunction 8 and Self Care/Home Management Training 23 12/28/2021

## 2021-12-29 LAB
ACANTHOCYTES BLD QL SMEAR: PRESENT
ALBUMIN SERPL BCP-MCNC: 2.8 G/DL (ref 3.5–5.2)
ALP SERPL-CCNC: 62 U/L (ref 55–135)
ALT SERPL W/O P-5'-P-CCNC: 28 U/L (ref 10–44)
ANION GAP SERPL CALC-SCNC: 7 MMOL/L (ref 8–16)
ANISOCYTOSIS BLD QL SMEAR: SLIGHT
AST SERPL-CCNC: 21 U/L (ref 10–40)
BASOPHILS # BLD AUTO: 0.04 K/UL (ref 0–0.2)
BASOPHILS NFR BLD: 0.2 % (ref 0–1.9)
BILIRUB SERPL-MCNC: 1.3 MG/DL (ref 0.1–1)
BUN SERPL-MCNC: 23 MG/DL (ref 8–23)
BURR CELLS BLD QL SMEAR: ABNORMAL
CALCIUM SERPL-MCNC: 8.3 MG/DL (ref 8.7–10.5)
CHLORIDE SERPL-SCNC: 113 MMOL/L (ref 95–110)
CO2 SERPL-SCNC: 22 MMOL/L (ref 23–29)
CREAT SERPL-MCNC: 0.7 MG/DL (ref 0.5–1.4)
DIFFERENTIAL METHOD: ABNORMAL
EOSINOPHIL # BLD AUTO: 0.1 K/UL (ref 0–0.5)
EOSINOPHIL NFR BLD: 0.7 % (ref 0–8)
ERYTHROCYTE [DISTWIDTH] IN BLOOD BY AUTOMATED COUNT: 17.5 % (ref 11.5–14.5)
EST. GFR  (AFRICAN AMERICAN): >60 ML/MIN/1.73 M^2
EST. GFR  (NON AFRICAN AMERICAN): >60 ML/MIN/1.73 M^2
GLUCOSE SERPL-MCNC: 138 MG/DL (ref 70–110)
HCT VFR BLD AUTO: 29.7 % (ref 37–48.5)
HGB BLD-MCNC: 9 G/DL (ref 12–16)
HYPOCHROMIA BLD QL SMEAR: ABNORMAL
IMM GRANULOCYTES # BLD AUTO: 0.54 K/UL (ref 0–0.04)
IMM GRANULOCYTES NFR BLD AUTO: 2.8 % (ref 0–0.5)
LYMPHOCYTES # BLD AUTO: 1.5 K/UL (ref 1–4.8)
LYMPHOCYTES NFR BLD: 7.7 % (ref 18–48)
MAGNESIUM SERPL-MCNC: 1.7 MG/DL (ref 1.6–2.6)
MCH RBC QN AUTO: 28.8 PG (ref 27–31)
MCHC RBC AUTO-ENTMCNC: 30.3 G/DL (ref 32–36)
MCV RBC AUTO: 95 FL (ref 82–98)
MONOCYTES # BLD AUTO: 3.4 K/UL (ref 0.3–1)
MONOCYTES NFR BLD: 17.1 % (ref 4–15)
NEUTROPHILS # BLD AUTO: 14 K/UL (ref 1.8–7.7)
NEUTROPHILS NFR BLD: 71.5 % (ref 38–73)
NRBC BLD-RTO: 0 /100 WBC
OVALOCYTES BLD QL SMEAR: ABNORMAL
PHOSPHATE SERPL-MCNC: 1.7 MG/DL (ref 2.7–4.5)
PLATELET # BLD AUTO: 147 K/UL (ref 150–450)
PLATELET BLD QL SMEAR: ABNORMAL
PMV BLD AUTO: 11.4 FL (ref 9.2–12.9)
POCT GLUCOSE: 127 MG/DL (ref 70–110)
POIKILOCYTOSIS BLD QL SMEAR: ABNORMAL
POLYCHROMASIA BLD QL SMEAR: ABNORMAL
POTASSIUM SERPL-SCNC: 3.7 MMOL/L (ref 3.5–5.1)
PROT SERPL-MCNC: 4.8 G/DL (ref 6–8.4)
RBC # BLD AUTO: 3.13 M/UL (ref 4–5.4)
SCHISTOCYTES BLD QL SMEAR: PRESENT
SODIUM SERPL-SCNC: 142 MMOL/L (ref 136–145)
WBC # BLD AUTO: 19.62 K/UL (ref 3.9–12.7)

## 2021-12-29 PROCEDURE — 84100 ASSAY OF PHOSPHORUS: CPT | Performed by: STUDENT IN AN ORGANIZED HEALTH CARE EDUCATION/TRAINING PROGRAM

## 2021-12-29 PROCEDURE — 51798 US URINE CAPACITY MEASURE: CPT

## 2021-12-29 PROCEDURE — 97535 SELF CARE MNGMENT TRAINING: CPT

## 2021-12-29 PROCEDURE — 83735 ASSAY OF MAGNESIUM: CPT | Performed by: STUDENT IN AN ORGANIZED HEALTH CARE EDUCATION/TRAINING PROGRAM

## 2021-12-29 PROCEDURE — 80053 COMPREHEN METABOLIC PANEL: CPT | Performed by: STUDENT IN AN ORGANIZED HEALTH CARE EDUCATION/TRAINING PROGRAM

## 2021-12-29 PROCEDURE — 20000000 HC ICU ROOM

## 2021-12-29 PROCEDURE — A4216 STERILE WATER/SALINE, 10 ML: HCPCS | Performed by: PSYCHIATRY & NEUROLOGY

## 2021-12-29 PROCEDURE — 51701 INSERT BLADDER CATHETER: CPT

## 2021-12-29 PROCEDURE — 99900035 HC TECH TIME PER 15 MIN (STAT)

## 2021-12-29 PROCEDURE — 99233 PR SUBSEQUENT HOSPITAL CARE,LEVL III: ICD-10-PCS | Mod: ,,, | Performed by: NEUROLOGICAL SURGERY

## 2021-12-29 PROCEDURE — 63600175 PHARM REV CODE 636 W HCPCS: Performed by: NURSE PRACTITIONER

## 2021-12-29 PROCEDURE — 99233 SBSQ HOSP IP/OBS HIGH 50: CPT | Mod: ,,, | Performed by: PSYCHIATRY & NEUROLOGY

## 2021-12-29 PROCEDURE — 25000003 PHARM REV CODE 250: Performed by: PSYCHIATRY & NEUROLOGY

## 2021-12-29 PROCEDURE — 25000003 PHARM REV CODE 250: Performed by: NURSE PRACTITIONER

## 2021-12-29 PROCEDURE — 94664 DEMO&/EVAL PT USE INHALER: CPT

## 2021-12-29 PROCEDURE — 94799 UNLISTED PULMONARY SVC/PX: CPT

## 2021-12-29 PROCEDURE — 85025 COMPLETE CBC W/AUTO DIFF WBC: CPT | Performed by: STUDENT IN AN ORGANIZED HEALTH CARE EDUCATION/TRAINING PROGRAM

## 2021-12-29 PROCEDURE — 94640 AIRWAY INHALATION TREATMENT: CPT

## 2021-12-29 PROCEDURE — 92526 ORAL FUNCTION THERAPY: CPT

## 2021-12-29 PROCEDURE — 25000242 PHARM REV CODE 250 ALT 637 W/ HCPCS: Performed by: PSYCHIATRY & NEUROLOGY

## 2021-12-29 PROCEDURE — 97112 NEUROMUSCULAR REEDUCATION: CPT

## 2021-12-29 PROCEDURE — 97530 THERAPEUTIC ACTIVITIES: CPT

## 2021-12-29 PROCEDURE — 99233 SBSQ HOSP IP/OBS HIGH 50: CPT | Mod: ,,, | Performed by: NEUROLOGICAL SURGERY

## 2021-12-29 PROCEDURE — 94668 MNPJ CHEST WALL SBSQ: CPT

## 2021-12-29 PROCEDURE — 94761 N-INVAS EAR/PLS OXIMETRY MLT: CPT

## 2021-12-29 PROCEDURE — 63600175 PHARM REV CODE 636 W HCPCS: Performed by: PSYCHIATRY & NEUROLOGY

## 2021-12-29 PROCEDURE — 99233 PR SUBSEQUENT HOSPITAL CARE,LEVL III: ICD-10-PCS | Mod: ,,, | Performed by: PSYCHIATRY & NEUROLOGY

## 2021-12-29 RX ORDER — FAMOTIDINE 20 MG/1
20 TABLET, FILM COATED ORAL 2 TIMES DAILY
Status: DISCONTINUED | OUTPATIENT
Start: 2021-12-29 | End: 2022-01-04 | Stop reason: HOSPADM

## 2021-12-29 RX ADMIN — LACOSAMIDE 100 MG: 10 SOLUTION ORAL at 09:12

## 2021-12-29 RX ADMIN — FAMOTIDINE 20 MG: 20 TABLET ORAL at 09:12

## 2021-12-29 RX ADMIN — IPRATROPIUM BROMIDE AND ALBUTEROL SULFATE 3 ML: 2.5; .5 SOLUTION RESPIRATORY (INHALATION) at 04:12

## 2021-12-29 RX ADMIN — APIXABAN 5 MG: 5 TABLET, FILM COATED ORAL at 09:12

## 2021-12-29 RX ADMIN — HYDROXYCHLOROQUINE SULFATE 200 MG: 200 TABLET, FILM COATED ORAL at 09:12

## 2021-12-29 RX ADMIN — SODIUM CHLORIDE 30 MG/ML INHALATION SOLUTION 4 ML: 30 SOLUTION INHALANT at 12:12

## 2021-12-29 RX ADMIN — SODIUM CHLORIDE 30 MG/ML INHALATION SOLUTION 4 ML: 30 SOLUTION INHALANT at 04:12

## 2021-12-29 RX ADMIN — SENNOSIDES AND DOCUSATE SODIUM 1 TABLET: 50; 8.6 TABLET ORAL at 09:12

## 2021-12-29 RX ADMIN — IPRATROPIUM BROMIDE AND ALBUTEROL SULFATE 3 ML: 2.5; .5 SOLUTION RESPIRATORY (INHALATION) at 03:12

## 2021-12-29 RX ADMIN — AMLODIPINE BESYLATE 10 MG: 10 TABLET ORAL at 09:12

## 2021-12-29 RX ADMIN — Medication 10 ML: at 09:12

## 2021-12-29 RX ADMIN — ACETAMINOPHEN 650 MG: 325 TABLET ORAL at 09:12

## 2021-12-29 RX ADMIN — IPRATROPIUM BROMIDE AND ALBUTEROL SULFATE 3 ML: 2.5; .5 SOLUTION RESPIRATORY (INHALATION) at 12:12

## 2021-12-29 RX ADMIN — ACETYLCYSTEINE 4 ML: 100 INHALANT RESPIRATORY (INHALATION) at 08:12

## 2021-12-29 RX ADMIN — IPRATROPIUM BROMIDE AND ALBUTEROL SULFATE 3 ML: 2.5; .5 SOLUTION RESPIRATORY (INHALATION) at 08:12

## 2021-12-29 RX ADMIN — ATORVASTATIN CALCIUM 40 MG: 20 TABLET, FILM COATED ORAL at 09:12

## 2021-12-29 RX ADMIN — ACETYLCYSTEINE 4 ML: 100 INHALANT RESPIRATORY (INHALATION) at 12:12

## 2021-12-29 RX ADMIN — SODIUM CHLORIDE 30 MG/ML INHALATION SOLUTION 4 ML: 30 SOLUTION INHALANT at 03:12

## 2021-12-29 RX ADMIN — ACETYLCYSTEINE 4 ML: 100 INHALANT RESPIRATORY (INHALATION) at 03:12

## 2021-12-29 RX ADMIN — SODIUM CHLORIDE 30 MG/ML INHALATION SOLUTION 4 ML: 30 SOLUTION INHALANT at 07:12

## 2021-12-29 RX ADMIN — SILODOSIN 4 MG: 4 CAPSULE ORAL at 09:12

## 2021-12-29 RX ADMIN — PREDNISONE 30 MG: 20 TABLET ORAL at 09:12

## 2021-12-29 RX ADMIN — SODIUM CHLORIDE 30 MG/ML INHALATION SOLUTION 4 ML: 30 SOLUTION INHALANT at 08:12

## 2021-12-29 RX ADMIN — MYCOPHENOLATE MOFETIL 500 MG: 200 POWDER, FOR SUSPENSION ORAL at 09:12

## 2021-12-29 RX ADMIN — ACETYLCYSTEINE 4 ML: 100 INHALANT RESPIRATORY (INHALATION) at 07:12

## 2021-12-29 RX ADMIN — Medication 12 MG: at 09:12

## 2021-12-29 RX ADMIN — IPRATROPIUM BROMIDE AND ALBUTEROL SULFATE 3 ML: 2.5; .5 SOLUTION RESPIRATORY (INHALATION) at 07:12

## 2021-12-29 RX ADMIN — MONTELUKAST 10 MG: 10 TABLET, FILM COATED ORAL at 09:12

## 2021-12-29 NOTE — PT/OT/SLP PROGRESS
Occupational Therapy      Patient Name:  Selma Lux   MRN:  [id number]    Patient not seen today secondary to two attempts made in pm and pt unavailable (ANA then with RT who reported needing to do breathing tx, chest PT and IS with pt). OT unable to return    12/29/2021

## 2021-12-29 NOTE — PT/OT/SLP PROGRESS
Speech Language Pathology Treatment    Patient Name:  Selma Lux   MRN:  [id number]  Admitting Diagnosis: SDH (subdural hematoma)    Recommendations:                 General Recommendations:  Dysphagia therapy and Speech/language therapy  Diet recommendations:  Mechanical soft, Liquid Diet Level: Thin   Aspiration Precautions: 1 bite/sip at a time, Alternating bites/sips, Assistance with meals, Avoid talking while eating, Eliminate distractions, Feed only when awake/alert, Frequent oral care, HOB to 90 degrees, Monitor for s/s of aspiration, Remain upright 30 minutes post meal, Small bites/sips and Strict aspiration precautions   General Precautions: Standard, aspiration,fall  Communication strategies:  provide increased time to answer and go to room if call light pushed    Subjective     Pt seen for ongoing swallowing assessment after working with PT. Pt fatigued after session.     Pain/Comfort:  · Pain Rating 1: 0/10    Respiratory Status: Room air    Objective:     Has the patient been evaluated by SLP for swallowing?   Yes  Keep patient NPO? No   Current Respiratory Status:        Pt repositioned to more upright position before PO presentations were given.  Pt accepted the following PO trials: straw sips of water x 2, cup sip of milk x 1, straw sips of milk x 3, and 1/4 eryn cracker x2 dipped in milk to soften.  Pt with prolonged mastication of soft solids and need for cues to swallow bolus x 1.  Cough following cup sip of milk, but not after straw sips.  Improved tolerance via straw may have been related to pt's ability to better control rate of intake when taking straw sips vs cup sip fed by SLP.  Delayed swallow responses noted across trials.  Pt was able to  Adequately clear oral cavity. Education provided to pt and caregiver regarding SLP's role in ongoing swallowing assessment, recommendations to advance to mechanical soft diet, feeding and swallowing strategies to maximize safety and reduce risks of  aspiration during PO intake, and SLP treatment plan and POC.  Understanding was expressed, though ongoing reinforcement will be beneficial.     Assessment:     Selma Lux is a 86 y.o. female with an SLP diagnosis of Dysphagia and Cognitive-Linguistic Impairment.     Goals:   Multidisciplinary Problems     SLP Goals        Problem: SLP Goal    Goal Priority Disciplines Outcome   SLP Goal     SLP Ongoing, Progressing   Description: Speech Language Pathology Goals  Goals expected to be met by 1/8:  1. Pt will participate in an ongoing assessment to determine the least restrictive and safest diet with possible updated goals to follow pending results.  2. Pt will participate in a speech, language, and cognitive evaluation with possible updated goals to follow pending results.  2. Pt will attend to therapy tasks for 5+ minutes given 1 redirection across 2 therapy sessions.   3. Pt will complete complex comprehension tasks with 90% accy given min cues.   4. Pt will name 10 items in a concrete category given min cues and no time constraints across 2 categories.   5. Pt will follow complex directions with 75% acc given 1 repetition.   6. Pt will answer temporal orientation questions with 90% acc no cues.                       Plan:     · Patient to be seen:  4 x/week   · Plan of Care expires:  01/14/21  · Plan of Care reviewed with:  patient,caregiver   · SLP Follow-Up:  Yes       Discharge recommendations:  rehabilitation facility     Time Tracking:     SLP Treatment Date:   12/29/21  Speech Start Time:  1038  Speech Stop Time:  1057     Speech Total Time (min):  19 min    Billable Minutes: Treatment Swallowing Dysfunction 11 and Self Care/Home Management Training 8    12/29/2021

## 2021-12-29 NOTE — PT/OT/SLP PROGRESS
"Physical Therapy Treatment    Patient Name:  Selma Lux   MRN:  [id number]    Recommendations:     Discharge Recommendations:  rehabilitation facility   Discharge Equipment Recommendations:  (TBD)   Barriers to discharge: Increased level of assist and Inaccessible home    Assessment:     Selma Lux is a 86 y.o. female admitted with a medical diagnosis of SDH (subdural hematoma).  She presents with the following impairments/functional limitations: weakness,impaired endurance,impaired self care skills,impaired functional mobilty,gait instability,impaired balance,impaired cognition,decreased upper extremity function,decreased lower extremity function,decreased safety awareness,impaired coordination. These deficits affect their roles and responsibilities in which they were able to complete prior to admit. Selma Lux would continue to benefit from acute PT intervention to improve quality of life and focus on recovery of impairments. Once medically stable, recommending pt discharge to Inpatient Rehabilitation Facility. Able to perform 2 sit to stand trials with maximal assist x2, not appropriate yet for bed to chair transfer.    Rehab Prognosis: Good; patient continues to benefit from acute skilled PT services to address these deficits and reach maximum level of function.  Patient remains most appropriate to discharge to rehabilitation facility.  Recent Surgery: * No surgery found *      Plan:     During this hospitalization, patient to be seen 3 x/week to address the identified rehab impairments via gait training,therapeutic activities,therapeutic exercises,neuromuscular re-education and progress toward the following goals:    · Plan of Care Expires:  01/27/22    Subjective     Chief Complaint: None verbalized   Patient/Family Comments/Goals: "I'm tired"  Pain/Comfort:  · Pain Rating 1: 0/10  · Pain Rating Post-Intervention 1: 0/10    Objective:     Communicated with RN prior to session. Patient found HOB elevated " with telemetry,pulse ox (continuous),PICC line,pressure relief boots,SCD,blood pressure cuff upon PT entry to room.     General Precautions: Standard, aspiration,fall,pureed diet,seizure   Orthopedic Precautions:N/A   Braces: N/A    Functional Mobility:  · Bed Mobility:     · Rolling Left:  maximal assistance  · Rolling Right: maximal assistance  · Scooting: maximal assistance  · Supine to Sit: maximal assistance  · Sit to Supine: maximal assistance  · Transfers:     · Sit to Stand: maximal assistance of 2 persons with hand-held assist with cues for hand placement  · Gait: Deferred due to poor standing balance   · Balance:   · Static Sitting: Poor, able to maintain for 10 minute(s) with maximal assistance progressing to minimal assist with verbal and tactile cuing, demonstrates posterior lean  · Dynamic Sitting: Poor: Patient unable to accept challenge or move without loss of balance, moderate assistance  · Static Standing: Poor, able to maintain for 20 seconds with maximal assistance of 2 persons, patient requires significant verbal and tactile cuing for upright posture and gluteal activation with limited improvement noted  · Dynamic Standing: not assessed this visit    AM-PAC 6 CLICK MOBILITY  Turning over in bed (including adjusting bedclothes, sheets and blankets)?: 2  Sitting down on and standing up from a chair with arms (e.g., wheelchair, bedside commode, etc.): 2  Moving from lying on back to sitting on the side of the bed?: 2  Moving to and from a bed to a chair (including a wheelchair)?: 1  Need to walk in hospital room?: 1  Climbing 3-5 steps with a railing?: 1  Basic Mobility Total Score: 9     Therapeutic Activities and Exercises:  Patient educated on role of acute care PT and PT POC  Whiteboard updated  Patient noted to be soiled after sit to stand trials. Extra time spent changing linens and performing pericare with total assistance  Provided education on breathing technique, noted to be short of  breath after sit to stand trial    Patient left HOB elevated with all lines intact, call button in reach, RN notified and friend present.    GOALS:   Multidisciplinary Problems     Physical Therapy Goals        Problem: Physical Therapy Goal    Goal Priority Disciplines Outcome Goal Variances Interventions   Physical Therapy Goal     PT, PT/OT Ongoing, Progressing     Description: Goals to be met by: 1/10/2022    Patient will increase functional independence with mobility by performin. Supine to sit with minimum assistance  2. Sit to supine with minimum assistance  3. Sit to stand transfer with minimum assistance  4. Bed to chair transfer with moderate assistance using LRAD as needed  5. Gait  x 5 feet with moderate assistance using LRAD as needed  6. Ascend/descend 5 stair with bilateral handrails maximal assistance using LRAD as needed  7. Lower extremity exercise program x10 reps per handout, with independence                      Time Tracking:     PT Received On: 21  PT Start Time: 1007     PT Stop Time: 1039  PT Total Time (min): 32 min     Billable Minutes: Therapeutic Activity 17 min and Neuromuscular Re-education 15 min    Treatment Type: Treatment  PT/PTA: PT     PTA Visit Number: 0     2021    Therapy tech utilized for session to maximize physical performance and safety

## 2021-12-29 NOTE — PLAN OF CARE
Neuro Critical Care Transfer of Care note    Date of Admit: 12/13/2021  Date of Transfer / Stepdown: 12/29/2021    Brief History of Present Illness:      Selma Lux is a 86 y.o. female who  has no past medical history on file.. The patient presented to [facility] on 12/13/2021 with a primary complaint of Altered Mental Status (LNK last night. Found on floor altered an hour PTA only responding to painful stimuli. Normally responsive and Aox4 per EMS. WC bound. Left sided facial droop noted. Daughter in route. Hx of TIA. On blood thinners.)  Patient with seizure tendency on EEG, remains on Vimpat for seizure prophylaxis. Serial Ct heads stable. Eliquis was resumed. Neurosurgery scheduling a clinic follow up with repeat CT head.     Hospital Course By Problem with Pertinent Findings:     1. SDH (subdural hematoma)  -SDH with brain compression  -non surgical  -repeat imaging reviewed stable  - was on apixaban and reversed with PCC on admission  - apixaban resumed per NSGY, repeat CT head stable  - Neurosurgery follow up in clinic with repeat scam, ordered.    2. Encephalopathy  Improving this morning  Continues to perseverate on the dates  Encourage sleep at night and awake during the day  Delirium precautions  Melatonin at night no earlier than 21:00  Prn quetiapine    3. Cardiac arrest  Respiratory arrest   Now post extubation    4. Hyponatremia  -diet  -prn fluids    5. RA (rheumatoid arthritis)  --continue mycophenolate and hydroxychloroquine  --continue steroids  --will switch to prednisone as she was on at home, family requesting  --30mg so higher than home dose  --monitor for side effects        Consultants and Procedures:     Consultants:  Neurosurgery    Procedures:    EEG  A-line    Transfer Information:     Diet:  Puree, Thin   Add Boost Plus BID (chocolate or vanilla) to aid in caloric intake.     Physical Activity:  rehab    To Do / Pending Studies / Follow ups:  Repeat CT head stat with  [facility]  Neuro Crtical Care

## 2021-12-29 NOTE — PROGRESS NOTES
[facility]  Neurocritical Care  Progress Note    Admit Date: 12/13/2021  Service Date: 12/29/2021  Length of Stay: 16    Subjective:     Chief Complaint: SDH (subdural hematoma)    History of Present Illness:   The patient is an 86-year-old female with past medical history of TIA, RA and Afib of Eliquis admitted to [facility] with Right SDH.  She was found on the floor this morning only responding to painful stimuli.  Patient occasionally says her name.GCS 12 noted for EMS.  PCC administered. She had a mechanical fall out of bed on Thursday hitting her head and has been complaining of intermittent headaches since. Her last dose of eliquis was on 12/11. Pending repeat CT head. NSGY consulted. In addition patient was found to be hyponatremic at 118-central line placed and hypertonic saline infusion initiated. Pending repeat CT head. Patient admitted to [facility] for close monitoring and higher level of care.       Hospital Course: 12/13/2021: patient admitted to [facility] s/p fall on 12/11 on Eliquis  12/14/2021 wheezing, prolonged expiratory phase.   12/15/2021 copious respiratory secretions. Remains encephalopathic. Review EEG  12/16/2021 improved respiratory secretions. D/c nasal tracheal airway if minimal secretions.   12/17/2021 restless.   12/18/2021 lethargic, rising BUN/Cr ratio. Fluid resuscitation. Received flumazenil for BDZ   12/19/2021 encephalopathy overnight. Work up neg so far. BUN/Cr >20 from volume contraction.   12/20/2021 left facial twitching overnight. Loaded with vimpat.   12/21/2021 event overnight cardiac arrest/PEA likely related to respiratory distress. Intubated.   12/22/2021 Add precedex to enable vent weaning.  12/23/2021   extubation trial today  12/24/2021 extubated over cook catheter due to concern for laryngeal edema. Successfully extubated this am. Close monitoring.   12/25/2021 agitated delirium overnight.   12/26/2021 increase cough assist frequency.   12/27: pulmonary toilette,  prednisone, daughter updated at bedside, change diet to puree  12/28: resume home apixaban, rescan head tonight, continue pulmonary toilette, can probably step down in AM, melatonin at 22 hr  12/29/2021 CT head stable./ Pureed diet started. Stepping down to medicine.       Interval History:   >4 elements OR status of 3 inpatient conditions    Review of Systems   Constitutional: Negative for fever.   HENT: Positive for trouble swallowing. Negative for voice change.    Eyes: Negative for visual disturbance.   Respiratory: Negative for shortness of breath and wheezing.    Gastrointestinal: Negative for nausea.   Musculoskeletal: Negative for neck pain and neck stiffness.   Skin: Negative for rash.   Allergic/Immunologic: Positive for immunocompromised state.   Neurological: Negative for dizziness and headaches.   Psychiatric/Behavioral: Negative for agitation and confusion.     2 systems OR Unable to obtain a complete ROS due to level of consciousness.  Objective:     Vitals:  Temp: 97.8 °F (36.6 °C)  Pulse: 107  Rhythm: sinus tachycardia  BP: (!) 155/74  MAP (mmHg): 107  Resp: (!) 26  SpO2: 100 %  Oxygen Concentration (%): 21  O2 Device (Oxygen Therapy): room air    Temp  Min: 97.8 °F (36.6 °C)  Max: 99.5 °F (37.5 °C)  Pulse  Min: 99  Max: 114  BP  Min: 108/59  Max: 162/73  MAP (mmHg)  Min: 78  Max: 119  Resp  Min: 18  Max: 79  SpO2  Min: 95 %  Max: 100 %  Oxygen Concentration (%)  Min: 21  Max: 21    12/28 0701 - 12/29 0700  In: 370 [P.O.:360; I.V.:10]  Out: 550 [Urine:550]   Unmeasured Output  Urine Occurrence: 1  Stool Occurrence: 1  Pad Count: 1       Physical Exam  Constitutional:       Appearance: Normal appearance.   HENT:      Head: Normocephalic.      Mouth/Throat:      Mouth: Mucous membranes are dry.   Eyes:      Extraocular Movements: Extraocular movements intact.      Pupils: Pupils are equal, round, and reactive to light.   Cardiovascular:      Rate and Rhythm: Normal rate.   Musculoskeletal:          "General: Normal range of motion.      Cervical back: Normal range of motion.   Skin:     General: Skin is warm.   Neurological:      Mental Status: She is alert.      Comments: --sedation: none  --Mental Status: awake, follows simple commands, when asked the year she states "22...12..21" and perseverates on this, does not dose off this morning during assessment, improved from yesterday  --CN II-XII grossly intact.   --Pupils brisk bilat  --Motor: 4/5 throughout  --sensory: intact to soft touch and pain throughout  --Reflexes: not tested  --Gait: deferred           Medications:  Continuous Scheduledacetylcysteine 100 mg/ml (10%), 4 mL, QID WAKE  albuterol-ipratropium, 3 mL, Q4H  amLODIPine, 10 mg, Daily  apixaban, 5 mg, BID  atorvastatin, 40 mg, Daily  famotidine, 20 mg, BID  hydrOXYchloroQUINE, 200 mg, BID  lacosamide, 100 mg, Q12H  montelukast, 10 mg, Daily  mycophenolate mofetil, 500 mg, BID  predniSONE, 30 mg, Daily  senna-docusate 8.6-50 mg, 1 tablet, Daily  silodosin, 4 mg, Daily  sodium chloride 0.9%, 10 mL, Q6H  sodium chloride 3%, 4 mL, Q4H    PRNacetaminophen, 650 mg, Q6H PRN  albuterol-ipratropium, 3 mL, Q4H PRN  bisacodyL, 10 mg, Daily PRN  calcium gluconate IVPB, 1 g, PRN  calcium gluconate IVPB, 2 g, PRN  calcium gluconate IVPB, 3 g, PRN  dextrose 50%, 12.5 g, PRN  glucagon (human recombinant), 1 mg, PRN  insulin aspart U-100, 1-10 Units, Q4H PRN  labetalol, 5 mg, Q6H PRN  magnesium sulfate IVPB, 2 g, PRN  magnesium sulfate IVPB, 4 g, PRN  melatonin, 12 mg, Nightly PRN  ondansetron, 4 mg, Q6H PRN  potassium chloride in water, 40 mEq, PRN   And  potassium chloride in water, 60 mEq, PRN   And  potassium chloride in water, 80 mEq, PRN  racepinephrine, 0.5 mL, Q4H PRN  sodium chloride 0.9%, 10 mL, PRN      Today I personally reviewed pertinent medications, lines/drains/airways, imaging, laboratory results, notably:    Diet  Diet Dysphagia Mechanical Soft (IDDSI Level 5) [facility]; Thin  Diet " Dysphagia Mechanical Soft (IDDSI Level 5) [facility]; Thin        Assessment/Plan:     Neuro  * SDH (subdural hematoma)  - SDH with brain compression  - non surgical  - was on apixaban and reversed with PCC on admission    Plan:  - apixaban resumedper NSGY, repeat CT head remains stable, exam unchanged  - Vimpat 100 mg BID  - f/u with neurosurgery clinic with repeat CT head          The patient is being Prophylaxed for:  Venous Thromboembolism with: Mechanical or Chemical  Stress Ulcer with: H2B  Ventilator Pneumonia with: not applicable    Activity Orders          Diet Dysphagia Mechanical Soft (IDDSI Level 5) [facility]; Thin: Dysphagia 2 (Mechanical Soft Ground) starting at 12/29 1057    Turn patient starting at 12/19 1423        Full Code    Grace Block MD  Neurocritical Care  [facility]

## 2021-12-29 NOTE — SUBJECTIVE & OBJECTIVE
Interval History:  NAEON. Exam stable. Higher level confusion.    Medications:  Continuous Infusions:    Scheduled Meds:   acetylcysteine 100 mg/ml (10%)  4 mL Nebulization QID WAKE    albuterol-ipratropium  3 mL Nebulization Q4H    amLODIPine  10 mg Oral Daily    apixaban  5 mg Oral BID    atorvastatin  40 mg Oral Daily    famotidine  20 mg Oral BID    hydrOXYchloroQUINE  200 mg Oral BID    lacosamide  100 mg Oral Q12H    montelukast  10 mg Oral Daily    mycophenolate mofetil  500 mg Oral BID    predniSONE  30 mg Oral Daily    senna-docusate 8.6-50 mg  1 tablet Oral Daily    silodosin  4 mg Oral Daily    sodium chloride 0.9%  10 mL Intravenous Q6H    sodium chloride 3%  4 mL Nebulization Q4H     PRN Meds:acetaminophen, albuterol-ipratropium, bisacodyL, calcium gluconate IVPB, calcium gluconate IVPB, calcium gluconate IVPB, dextrose 50%, glucagon (human recombinant), insulin aspart U-100, labetalol, magnesium sulfate IVPB, magnesium sulfate IVPB, melatonin, ondansetron, potassium chloride in water **AND** potassium chloride in water **AND** potassium chloride in water, racepinephrine, Flushing PICC Protocol **AND** sodium chloride 0.9% **AND** sodium chloride 0.9%       Objective:     Weight: 67 kg (147 lb 11.3 oz)  Body mass index is 25.35 kg/m².  Vital Signs (Most Recent):  Temp: 97.8 °F (36.6 °C) (12/29/21 0305)  Pulse: 107 (12/29/21 0829)  Resp: (!) 26 (12/29/21 0829)  BP: (!) 155/74 (12/29/21 0605)  SpO2: 100 % (12/29/21 0829) Vital Signs (24h Range):  Temp:  [97.8 °F (36.6 °C)-99.5 °F (37.5 °C)] 97.8 °F (36.6 °C)  Pulse:  [] 107  Resp:  [18-79] 26  SpO2:  [95 %-100 %] 100 %  BP: (108-162)/() 155/74              NG/OG Tube 12/17/21 1405 Left nostril (Active)   Placement Check placement verified by aspirate characteristics 12/22/21 0305   Tolerance no signs/symptoms of discomfort 12/22/21 0305   Securement secured to nostril center 12/22/21 0305   Clamp Status/Tolerance no abdominal  "distention;no abdominal discomfort;no emesis;no nausea;no residual;no restlessness 12/22/21 0305   Suction Setting/Drainage Method suction at the bedside 12/22/21 0305   Insertion Site Appearance no redness, warmth, tenderness, skin breakdown, drainage 12/22/21 0305   Flush/Irrigation flushed w/;water;no resistance met 12/22/21 0305   Feeding Type continuous;by pump 12/22/21 0305   Feeding Action feeding continued 12/22/21 0305   Current Rate (mL/hr) 10 mL/hr 12/21/21 1905   Goal Rate (mL/hr) 10 mL/hr 12/21/21 1905   Intake (mL) 100 mL 12/20/21 0800   Water Bolus (mL) 200 mL 12/21/21 1600   Rate Formula Tube Feeding (mL/hr) 10 mL/hr 12/21/21 1905   Formula Name Isosource 12/22/21 0305   Intake (mL) - Formula Tube Feeding 20 12/21/21 1800   Residual Amount (ml) 0 ml 12/21/21 1905            Urethral Catheter 12/21/21 0300 Temperature probe 16 Fr. (Active)   Site Assessment Clean;Intact 12/22/21 0305   Collection Container Urimeter 12/22/21 0305   Securement Method secured to top of thigh w/ adhesive device 12/22/21 0305   Catheter Care Performed yes 12/22/21 0305   Reason for Continuing Urinary Catheterization Critically ill in ICU and requiring hourly monitoring of intake/output 12/22/21 0305   CAUTI Prevention Bundle StatLock in place w 1" slack;Intact seal between catheter & drainage tubing;Drainage bag/urimeter off the floor;Sheeting clip in use;No dependent loops or kinks;Drainage bag/urimeter not overfilled (<2/3 full);Drainage bag/urimeter below bladder 12/21/21 1905   Output (mL) 20 mL 12/22/21 0700     Physical Exam    AAOx3, PERRL, EOMI, FS, TM, 5/5 throughout, SILT.  Abdomen soft  No resp distress  Extremities intact      Significant Labs:  No results for input(s): GLU, NA, K, CL, CO2, BUN, CREATININE, CALCIUM, MG in the last 48 hours.  Recent Labs   Lab 12/27/21  0953   WBC 22.18*   HGB 9.7*   HCT 33.2*   *     No results for input(s): LABPT, INR, APTT in the last 48 hours.  Microbiology Results " (last 7 days)     Procedure Component Value Units Date/Time    Blood culture [[id number]] Collected: 12/21/21 1032    Order Status: Completed Specimen: Blood from Peripheral, Antecubital, Right Updated: 12/26/21 1212     Blood Culture, Routine No growth after 5 days.    Narrative:      Blood cultures x 2 different sites. 4 bottles total. Please  draw cultures before administering antibiotics.    Blood culture [[id number]] Collected: 12/21/21 1010    Order Status: Completed Specimen: Blood from Peripheral, Forearm, Right Updated: 12/26/21 1212     Blood Culture, Routine No growth after 5 days.    Narrative:      Blood cultures from 2 different sites. 4 bottles total.  Please draw before starting antibiotics.    Culture, Respiratory with Gram Stain [[id number]]  (Abnormal)  (Susceptibility) Collected: 12/21/21 1015    Order Status: Completed Specimen: Respiratory from Tracheal Aspirate Updated: 12/24/21 1001     Respiratory Culture No S aureus or Pseudomonas isolated.      ESCHERICHIA COLI  Few       Gram Stain (Respiratory) <10 epithelial cells per low power field.     Gram Stain (Respiratory) Many WBC's     Gram Stain (Respiratory) Few Gram positive cocci        All pertinent labs from the last 24 hours have been reviewed.    Significant Diagnostics:  I have reviewed and interpreted all pertinent imaging results/findings within the past 24 hours.I

## 2021-12-29 NOTE — SUBJECTIVE & OBJECTIVE
Interval History:   >4 elements OR status of 3 inpatient conditions    Review of Systems   Constitutional: Negative for fever.   HENT: Positive for trouble swallowing. Negative for voice change.    Eyes: Negative for visual disturbance.   Respiratory: Negative for shortness of breath and wheezing.    Gastrointestinal: Negative for nausea.   Musculoskeletal: Negative for neck pain and neck stiffness.   Skin: Negative for rash.   Allergic/Immunologic: Positive for immunocompromised state.   Neurological: Negative for dizziness and headaches.   Psychiatric/Behavioral: Negative for agitation and confusion.     2 systems OR Unable to obtain a complete ROS due to level of consciousness.  Objective:     Vitals:  Temp: 97.8 °F (36.6 °C)  Pulse: 107  Rhythm: sinus tachycardia  BP: (!) 155/74  MAP (mmHg): 107  Resp: (!) 26  SpO2: 100 %  Oxygen Concentration (%): 21  O2 Device (Oxygen Therapy): room air    Temp  Min: 97.8 °F (36.6 °C)  Max: 99.5 °F (37.5 °C)  Pulse  Min: 99  Max: 114  BP  Min: 108/59  Max: 162/73  MAP (mmHg)  Min: 78  Max: 119  Resp  Min: 18  Max: 79  SpO2  Min: 95 %  Max: 100 %  Oxygen Concentration (%)  Min: 21  Max: 21    12/28 0701 - 12/29 0700  In: 370 [P.O.:360; I.V.:10]  Out: 550 [Urine:550]   Unmeasured Output  Urine Occurrence: 1  Stool Occurrence: 1  Pad Count: 1       Physical Exam  Constitutional:       Appearance: Normal appearance.   HENT:      Head: Normocephalic.      Mouth/Throat:      Mouth: Mucous membranes are dry.   Eyes:      Extraocular Movements: Extraocular movements intact.      Pupils: Pupils are equal, round, and reactive to light.   Cardiovascular:      Rate and Rhythm: Normal rate.   Musculoskeletal:         General: Normal range of motion.      Cervical back: Normal range of motion.   Skin:     General: Skin is warm.   Neurological:      Mental Status: She is alert.      Comments: --sedation: none  --Mental Status: awake, follows simple commands, when asked the year she states  ""22...12..21" and perseverates on this, does not dose off this morning during assessment, improved from yesterday  --CN II-XII grossly intact.   --Pupils brisk bilat  --Motor: 4/5 throughout  --sensory: intact to soft touch and pain throughout  --Reflexes: not tested  --Gait: deferred           Medications:  Continuous Scheduledacetylcysteine 100 mg/ml (10%), 4 mL, QID WAKE  albuterol-ipratropium, 3 mL, Q4H  amLODIPine, 10 mg, Daily  apixaban, 5 mg, BID  atorvastatin, 40 mg, Daily  famotidine, 20 mg, BID  hydrOXYchloroQUINE, 200 mg, BID  lacosamide, 100 mg, Q12H  montelukast, 10 mg, Daily  mycophenolate mofetil, 500 mg, BID  predniSONE, 30 mg, Daily  senna-docusate 8.6-50 mg, 1 tablet, Daily  silodosin, 4 mg, Daily  sodium chloride 0.9%, 10 mL, Q6H  sodium chloride 3%, 4 mL, Q4H    PRNacetaminophen, 650 mg, Q6H PRN  albuterol-ipratropium, 3 mL, Q4H PRN  bisacodyL, 10 mg, Daily PRN  calcium gluconate IVPB, 1 g, PRN  calcium gluconate IVPB, 2 g, PRN  calcium gluconate IVPB, 3 g, PRN  dextrose 50%, 12.5 g, PRN  glucagon (human recombinant), 1 mg, PRN  insulin aspart U-100, 1-10 Units, Q4H PRN  labetalol, 5 mg, Q6H PRN  magnesium sulfate IVPB, 2 g, PRN  magnesium sulfate IVPB, 4 g, PRN  melatonin, 12 mg, Nightly PRN  ondansetron, 4 mg, Q6H PRN  potassium chloride in water, 40 mEq, PRN   And  potassium chloride in water, 60 mEq, PRN   And  potassium chloride in water, 80 mEq, PRN  racepinephrine, 0.5 mL, Q4H PRN  sodium chloride 0.9%, 10 mL, PRN      Today I personally reviewed pertinent medications, lines/drains/airways, imaging, laboratory results, notably:    Diet  Diet Dysphagia Mechanical Soft (IDDSI Level 5) [facility]; Thin  Diet Dysphagia Mechanical Soft (IDDSI Level 5) [facility]; Thin      "

## 2021-12-29 NOTE — PT/OT/SLP PROGRESS
Occupational Therapy      Patient Name:  Selma Lux   MRN:  [id number]    Patient's POC remains appropriate. Will follow-up on next available date.    12/29/2021

## 2021-12-29 NOTE — ASSESSMENT & PLAN NOTE
An 86F with on eliquis s/p head trauma 1 week prior now with small aSDH and tSAH and AMS c/b PEA arrest 12/20    Continue ICU care   Neurocheck per protocol   Work up:  --CTH: R temp/paretial aSDH 5-6mm thickness, 3-5mm MLS, stable on repeat as well as imaging post PEA arrest  --Follow up CT stable  Follow up head CT on Monday  Continue holding eliquis  AED per Epilepsy  SBP<150  Na >135  Eliquis now resumed and CTH stable once on elequis.     Medical management per NCC  Plan for TTF to NSGY stable per NCC  Please page NSGY immediately for any changes in neuro status

## 2021-12-29 NOTE — PROGRESS NOTES
[facility]  Neurosurgery  Progress Note    Subjective:     History of Present Illness: 86-year-old female with past medical history of TIA comes to the emergency department for AMS.  She was found on the floor about 1 hour ago only responding to painful stimuli.  Patient occasionally says her name.GCS 12 noted for EMS.  Patient does take blood thinners. She had a mechanical fall out of bed on Thursday hitting her head and has been complaining of intermittent headaches since. Her last dose of eliquis was on 12/11. NSGY consulted for CTH with SDH      Post-Op Info:  * No surgery found *         Interval History:  NAEON. Exam stable. Higher level confusion.    Medications:  Continuous Infusions:    Scheduled Meds:   acetylcysteine 100 mg/ml (10%)  4 mL Nebulization QID WAKE    albuterol-ipratropium  3 mL Nebulization Q4H    amLODIPine  10 mg Oral Daily    apixaban  5 mg Oral BID    atorvastatin  40 mg Oral Daily    famotidine  20 mg Oral BID    hydrOXYchloroQUINE  200 mg Oral BID    lacosamide  100 mg Oral Q12H    montelukast  10 mg Oral Daily    mycophenolate mofetil  500 mg Oral BID    predniSONE  30 mg Oral Daily    senna-docusate 8.6-50 mg  1 tablet Oral Daily    silodosin  4 mg Oral Daily    sodium chloride 0.9%  10 mL Intravenous Q6H    sodium chloride 3%  4 mL Nebulization Q4H     PRN Meds:acetaminophen, albuterol-ipratropium, bisacodyL, calcium gluconate IVPB, calcium gluconate IVPB, calcium gluconate IVPB, dextrose 50%, glucagon (human recombinant), insulin aspart U-100, labetalol, magnesium sulfate IVPB, magnesium sulfate IVPB, melatonin, ondansetron, potassium chloride in water **AND** potassium chloride in water **AND** potassium chloride in water, racepinephrine, Flushing PICC Protocol **AND** sodium chloride 0.9% **AND** sodium chloride 0.9%       Objective:     Weight: 67 kg (147 lb 11.3 oz)  Body mass index is 25.35 kg/m².  Vital Signs (Most Recent):  Temp: 97.8 °F  "(36.6 °C) (12/29/21 0305)  Pulse: 107 (12/29/21 0829)  Resp: (!) 26 (12/29/21 0829)  BP: (!) 155/74 (12/29/21 0605)  SpO2: 100 % (12/29/21 0829) Vital Signs (24h Range):  Temp:  [97.8 °F (36.6 °C)-99.5 °F (37.5 °C)] 97.8 °F (36.6 °C)  Pulse:  [] 107  Resp:  [18-79] 26  SpO2:  [95 %-100 %] 100 %  BP: (108-162)/() 155/74              NG/OG Tube 12/17/21 1405 Left nostril (Active)   Placement Check placement verified by aspirate characteristics 12/22/21 0305   Tolerance no signs/symptoms of discomfort 12/22/21 0305   Securement secured to nostril center 12/22/21 0305   Clamp Status/Tolerance no abdominal distention;no abdominal discomfort;no emesis;no nausea;no residual;no restlessness 12/22/21 0305   Suction Setting/Drainage Method suction at the bedside 12/22/21 0305   Insertion Site Appearance no redness, warmth, tenderness, skin breakdown, drainage 12/22/21 0305   Flush/Irrigation flushed w/;water;no resistance met 12/22/21 0305   Feeding Type continuous;by pump 12/22/21 0305   Feeding Action feeding continued 12/22/21 0305   Current Rate (mL/hr) 10 mL/hr 12/21/21 1905   Goal Rate (mL/hr) 10 mL/hr 12/21/21 1905   Intake (mL) 100 mL 12/20/21 0800   Water Bolus (mL) 200 mL 12/21/21 1600   Rate Formula Tube Feeding (mL/hr) 10 mL/hr 12/21/21 1905   Formula Name Isosource 12/22/21 0305   Intake (mL) - Formula Tube Feeding 20 12/21/21 1800   Residual Amount (ml) 0 ml 12/21/21 1905            Urethral Catheter 12/21/21 0300 Temperature probe 16 Fr. (Active)   Site Assessment Clean;Intact 12/22/21 0305   Collection Container Urimeter 12/22/21 0305   Securement Method secured to top of thigh w/ adhesive device 12/22/21 0305   Catheter Care Performed yes 12/22/21 0305   Reason for Continuing Urinary Catheterization Critically ill in ICU and requiring hourly monitoring of intake/output 12/22/21 0305   CAUTI Prevention Bundle StatLock in place w 1" slack;Intact seal between catheter & drainage tubing;Drainage " bag/urimeter off the floor;Sheeting clip in use;No dependent loops or kinks;Drainage bag/urimeter not overfilled (<2/3 full);Drainage bag/urimeter below bladder 12/21/21 1905   Output (mL) 20 mL 12/22/21 0700     Physical Exam    AAOx3, PERRL, EOMI, FS, TM, 5/5 throughout, SILT.  Abdomen soft  No resp distress  Extremities intact      Significant Labs:  No results for input(s): GLU, NA, K, CL, CO2, BUN, CREATININE, CALCIUM, MG in the last 48 hours.  Recent Labs   Lab 12/27/21  0953   WBC 22.18*   HGB 9.7*   HCT 33.2*   *     No results for input(s): LABPT, INR, APTT in the last 48 hours.  Microbiology Results (last 7 days)     Procedure Component Value Units Date/Time    Blood culture [[id number]] Collected: 12/21/21 1032    Order Status: Completed Specimen: Blood from Peripheral, Antecubital, Right Updated: 12/26/21 1212     Blood Culture, Routine No growth after 5 days.    Narrative:      Blood cultures x 2 different sites. 4 bottles total. Please  draw cultures before administering antibiotics.    Blood culture [[id number]] Collected: 12/21/21 1010    Order Status: Completed Specimen: Blood from Peripheral, Forearm, Right Updated: 12/26/21 1212     Blood Culture, Routine No growth after 5 days.    Narrative:      Blood cultures from 2 different sites. 4 bottles total.  Please draw before starting antibiotics.    Culture, Respiratory with Gram Stain [[id number]]  (Abnormal)  (Susceptibility) Collected: 12/21/21 1015    Order Status: Completed Specimen: Respiratory from Tracheal Aspirate Updated: 12/24/21 1001     Respiratory Culture No S aureus or Pseudomonas isolated.      ESCHERICHIA COLI  Few       Gram Stain (Respiratory) <10 epithelial cells per low power field.     Gram Stain (Respiratory) Many WBC's     Gram Stain (Respiratory) Few Gram positive cocci        All pertinent labs from the last 24 hours have been reviewed.    Significant Diagnostics:  I have reviewed and interpreted all pertinent imaging  results/findings within the past 24 hours.I    Assessment/Plan:     * SDH (subdural hematoma)  An 86F with on eliquis s/p head trauma 1 week prior now with small aSDH and tSAH and AMS c/b PEA arrest 12/20    Continue ICU care   Neurocheck per protocol   Work up:  --CTH: R temp/paretial aSDH 5-6mm thickness, 3-5mm MLS, stable on repeat as well as imaging post PEA arrest  --Follow up CT stable  Follow up head CT on Monday  Continue holding eliquis  AED per Epilepsy  SBP<150  Na >135  Eliquis now resumed and CTH stable since initiating elequis.     Medical management per NCC  Okay for TTF to preferably to hospital medicine if/when stable per NCC  Please page NSGY immediately for any changes in neuro status      Emanuel Cantu MD  Neurosurgery  [facility]

## 2021-12-29 NOTE — ASSESSMENT & PLAN NOTE
- SDH with brain compression  - non surgical  - was on apixaban and reversed with PCC on admission    Plan:  - apixaban resumedper NSGY, repeat CT head remains stable, exam unchanged  - Vimpat 100 mg BID  - f/u with neurosurgery clinic with repeat CT head

## 2021-12-29 NOTE — PLAN OF CARE
[facility] Care Plan    POC reviewed with Selma Lux and her daughter at 0300. Pt and daughter verbalized understanding. Questions and concerns addressed. No acute events overnight. Pt progressing toward goals. Will continue to monitor. See below and flowsheets for full assessment and VS info.     -Pt presented short episodes of agitation x2; easily redirected; reorientation and reassurance provided; pt calmed down soon after being reoriented to situation and place and place and being reassured her daughter was at the bedside; family presence promoted; neuro exam stable; delirium precautions maintained    -Pt voided spontaneously once; bladder scanned and straight cath x1 for PVR  >350 cc; Last bladder scan at 0530 showed 104 cc       Neuro:  [place] Coma Scale  Best Eye Response: 4-->(E4) spontaneous  Best Motor Response: 6-->(M6) obeys commands  Best Verbal Response: 5-->(V5) oriented  [place] Coma Scale Score: 15  Assessment Qualifiers: patient not sedated/intubated,no eye obstruction present  Pupil PERRLA: yes     24hr Temp:  [97.8 °F (36.6 °C)-99.5 °F (37.5 °C)]     CV:   Rhythm: sinus tachycardia  BP goals:   SBP < 160  MAP > 65    Resp:   O2 Device (Oxygen Therapy): room air    Plan: N/A    GI/:  SCOOBY Total Score: 20  Diet/Nutrition Received: mechanical/dental soft  Last Bowel Movement: 12/29/21  Voiding Characteristics: unable to void    Intake/Output Summary (Last 24 hours) at 12/29/2021 0404  Last data filed at 12/29/2021 0305  Gross per 24 hour   Intake 310 ml   Output 550 ml   Net -240 ml     Unmeasured Output  Urine Occurrence: 1  Stool Occurrence: 1  Pad Count: 1    Labs/Accuchecks:  Recent Labs   Lab 12/27/21  0953   WBC 22.18*   RBC 3.42*   HGB 9.7*   HCT 33.2*   *      Recent Labs   Lab 12/26/21  0028      K 3.7   CO2 21*      BUN 29*   CREATININE 0.7   ALKPHOS 57   ALT 30   AST 26   BILITOT 1.5*    No results for input(s): PROTIME, INR, APTT, HEPANTIXA in the last 168 hours. No  results for input(s): CPK, CPKMB, TROPONINI, MB in the last 168 hours.    Electrolytes: N/A - electrolytes WDL/ No lab orders  Accuchecks: ACHS    Gtts:       LDA/Wounds:  Lines/Drains/Airways       Peripherally Inserted Central Catheter Line              PICC Double Lumen 12/15/21 1616 right basilic 13 days              Airway                 Airway Anesthesia 12/21/21 8 days                  Wounds: No  Wound care consulted: No

## 2021-12-30 PROBLEM — R41.0 DELIRIUM: Status: ACTIVE | Noted: 2021-12-30

## 2021-12-30 LAB — POCT GLUCOSE: 137 MG/DL (ref 70–110)

## 2021-12-30 PROCEDURE — 63600175 PHARM REV CODE 636 W HCPCS: Performed by: PSYCHIATRY & NEUROLOGY

## 2021-12-30 PROCEDURE — 51701 INSERT BLADDER CATHETER: CPT

## 2021-12-30 PROCEDURE — 97129 THER IVNTJ 1ST 15 MIN: CPT

## 2021-12-30 PROCEDURE — 94668 MNPJ CHEST WALL SBSQ: CPT

## 2021-12-30 PROCEDURE — 99900035 HC TECH TIME PER 15 MIN (STAT)

## 2021-12-30 PROCEDURE — 99233 SBSQ HOSP IP/OBS HIGH 50: CPT | Mod: ,,, | Performed by: PSYCHIATRY & NEUROLOGY

## 2021-12-30 PROCEDURE — 63600175 PHARM REV CODE 636 W HCPCS: Performed by: NURSE PRACTITIONER

## 2021-12-30 PROCEDURE — A4216 STERILE WATER/SALINE, 10 ML: HCPCS | Performed by: PSYCHIATRY & NEUROLOGY

## 2021-12-30 PROCEDURE — 97530 THERAPEUTIC ACTIVITIES: CPT

## 2021-12-30 PROCEDURE — 25000003 PHARM REV CODE 250: Performed by: NURSE PRACTITIONER

## 2021-12-30 PROCEDURE — 25000242 PHARM REV CODE 250 ALT 637 W/ HCPCS: Performed by: PSYCHIATRY & NEUROLOGY

## 2021-12-30 PROCEDURE — 94761 N-INVAS EAR/PLS OXIMETRY MLT: CPT

## 2021-12-30 PROCEDURE — 51798 US URINE CAPACITY MEASURE: CPT

## 2021-12-30 PROCEDURE — 97112 NEUROMUSCULAR REEDUCATION: CPT

## 2021-12-30 PROCEDURE — 99233 PR SUBSEQUENT HOSPITAL CARE,LEVL III: ICD-10-PCS | Mod: ,,, | Performed by: PSYCHIATRY & NEUROLOGY

## 2021-12-30 PROCEDURE — 25000242 PHARM REV CODE 250 ALT 637 W/ HCPCS: Performed by: PHYSICIAN ASSISTANT

## 2021-12-30 PROCEDURE — 94664 DEMO&/EVAL PT USE INHALER: CPT

## 2021-12-30 PROCEDURE — 25000003 PHARM REV CODE 250: Performed by: PSYCHIATRY & NEUROLOGY

## 2021-12-30 PROCEDURE — 20000000 HC ICU ROOM

## 2021-12-30 PROCEDURE — 94640 AIRWAY INHALATION TREATMENT: CPT

## 2021-12-30 RX ORDER — IPRATROPIUM BROMIDE AND ALBUTEROL SULFATE 2.5; .5 MG/3ML; MG/3ML
3 SOLUTION RESPIRATORY (INHALATION) EVERY 4 HOURS PRN
Status: DISCONTINUED | OUTPATIENT
Start: 2021-12-30 | End: 2021-12-30

## 2021-12-30 RX ORDER — TALC
12 POWDER (GRAM) TOPICAL NIGHTLY
Status: DISCONTINUED | OUTPATIENT
Start: 2021-12-30 | End: 2022-01-04 | Stop reason: HOSPADM

## 2021-12-30 RX ADMIN — SODIUM CHLORIDE 30 MG/ML INHALATION SOLUTION 4 ML: 30 SOLUTION INHALANT at 12:12

## 2021-12-30 RX ADMIN — QUETIAPINE FUMARATE 12.5 MG: 25 TABLET, FILM COATED ORAL at 09:12

## 2021-12-30 RX ADMIN — ACETAMINOPHEN 650 MG: 325 TABLET ORAL at 04:12

## 2021-12-30 RX ADMIN — APIXABAN 5 MG: 5 TABLET, FILM COATED ORAL at 09:12

## 2021-12-30 RX ADMIN — SODIUM CHLORIDE 30 MG/ML INHALATION SOLUTION 4 ML: 30 SOLUTION INHALANT at 04:12

## 2021-12-30 RX ADMIN — MYCOPHENOLATE MOFETIL 500 MG: 200 POWDER, FOR SUSPENSION ORAL at 10:12

## 2021-12-30 RX ADMIN — IPRATROPIUM BROMIDE AND ALBUTEROL SULFATE 3 ML: 2.5; .5 SOLUTION RESPIRATORY (INHALATION) at 08:12

## 2021-12-30 RX ADMIN — SODIUM CHLORIDE 30 MG/ML INHALATION SOLUTION 4 ML: 30 SOLUTION INHALANT at 08:12

## 2021-12-30 RX ADMIN — AMLODIPINE BESYLATE 10 MG: 10 TABLET ORAL at 10:12

## 2021-12-30 RX ADMIN — SILODOSIN 4 MG: 4 CAPSULE ORAL at 10:12

## 2021-12-30 RX ADMIN — MYCOPHENOLATE MOFETIL 500 MG: 200 POWDER, FOR SUSPENSION ORAL at 09:12

## 2021-12-30 RX ADMIN — ACETYLCYSTEINE 4 ML: 100 INHALANT RESPIRATORY (INHALATION) at 04:12

## 2021-12-30 RX ADMIN — MELATONIN TAB 3 MG 12 MG: 3 TAB at 09:12

## 2021-12-30 RX ADMIN — ACETYLCYSTEINE 4 ML: 100 INHALANT RESPIRATORY (INHALATION) at 08:12

## 2021-12-30 RX ADMIN — Medication 10 ML: at 12:12

## 2021-12-30 RX ADMIN — APIXABAN 5 MG: 5 TABLET, FILM COATED ORAL at 10:12

## 2021-12-30 RX ADMIN — PREDNISONE 30 MG: 20 TABLET ORAL at 10:12

## 2021-12-30 RX ADMIN — FAMOTIDINE 20 MG: 20 TABLET ORAL at 09:12

## 2021-12-30 RX ADMIN — IPRATROPIUM BROMIDE AND ALBUTEROL SULFATE 3 ML: 2.5; .5 SOLUTION RESPIRATORY (INHALATION) at 12:12

## 2021-12-30 RX ADMIN — LACOSAMIDE 100 MG: 10 SOLUTION ORAL at 09:12

## 2021-12-30 RX ADMIN — IPRATROPIUM BROMIDE AND ALBUTEROL SULFATE 3 ML: 2.5; .5 SOLUTION RESPIRATORY (INHALATION) at 04:12

## 2021-12-30 RX ADMIN — ACETYLCYSTEINE 4 ML: 100 INHALANT RESPIRATORY (INHALATION) at 01:12

## 2021-12-30 RX ADMIN — HYDROXYCHLOROQUINE SULFATE 200 MG: 200 TABLET, FILM COATED ORAL at 09:12

## 2021-12-30 RX ADMIN — FAMOTIDINE 20 MG: 20 TABLET ORAL at 10:12

## 2021-12-30 RX ADMIN — HYDROXYCHLOROQUINE SULFATE 200 MG: 200 TABLET, FILM COATED ORAL at 10:12

## 2021-12-30 RX ADMIN — SODIUM CHLORIDE 30 MG/ML INHALATION SOLUTION 4 ML: 30 SOLUTION INHALANT at 01:12

## 2021-12-30 RX ADMIN — IPRATROPIUM BROMIDE AND ALBUTEROL SULFATE 3 ML: 2.5; .5 SOLUTION RESPIRATORY (INHALATION) at 01:12

## 2021-12-30 RX ADMIN — MONTELUKAST 10 MG: 10 TABLET, FILM COATED ORAL at 10:12

## 2021-12-30 RX ADMIN — SENNOSIDES AND DOCUSATE SODIUM 1 TABLET: 50; 8.6 TABLET ORAL at 10:12

## 2021-12-30 RX ADMIN — ATORVASTATIN CALCIUM 40 MG: 20 TABLET, FILM COATED ORAL at 10:12

## 2021-12-30 NOTE — SUBJECTIVE & OBJECTIVE
Interval History:  NAEON. Increased delirium this am. Otherwise stable, pending TTF to     Medications:  Continuous Infusions:    Scheduled Meds:   acetylcysteine 100 mg/ml (10%)  4 mL Nebulization QID WAKE    amLODIPine  10 mg Oral Daily    apixaban  5 mg Oral BID    atorvastatin  40 mg Oral Daily    famotidine  20 mg Oral BID    hydrOXYchloroQUINE  200 mg Oral BID    lacosamide  100 mg Oral Q12H    melatonin  12 mg Oral Nightly    montelukast  10 mg Oral Daily    mycophenolate mofetil  500 mg Oral BID    predniSONE  30 mg Oral Daily    QUEtiapine  12.5 mg Oral QHS    senna-docusate 8.6-50 mg  1 tablet Oral Daily    silodosin  4 mg Oral Daily    sodium chloride 0.9%  10 mL Intravenous Q6H    sodium chloride 3%  4 mL Nebulization Q4H     PRN Meds:acetaminophen, albuterol-ipratropium, bisacodyL, calcium gluconate IVPB, calcium gluconate IVPB, calcium gluconate IVPB, dextrose 50%, glucagon (human recombinant), insulin aspart U-100, labetalol, magnesium sulfate IVPB, magnesium sulfate IVPB, ondansetron, potassium chloride in water **AND** potassium chloride in water **AND** potassium chloride in water, racepinephrine, Flushing PICC Protocol **AND** sodium chloride 0.9% **AND** sodium chloride 0.9%       Objective:     Weight: 67 kg (147 lb 11.3 oz)  Body mass index is 25.35 kg/m².  Vital Signs (Most Recent):  Temp: 98.2 °F (36.8 °C) (12/30/21 0305)  Pulse: 103 (12/30/21 0817)  Resp: (!) 25 (12/30/21 0817)  BP: 111/82 (12/30/21 0605)  SpO2: 100 % (12/30/21 0817) Vital Signs (24h Range):  Temp:  [97.8 °F (36.6 °C)-98.2 °F (36.8 °C)] 98.2 °F (36.8 °C)  Pulse:  [] 103  Resp:  [16-28] 25  SpO2:  [100 %] 100 %  BP: (111-139)/(56-82) 111/82              NG/OG Tube 12/17/21 1405 Left nostril (Active)   Placement Check placement verified by aspirate characteristics 12/22/21 0305   Tolerance no signs/symptoms of discomfort 12/22/21 0305   Securement secured to nostril center 12/22/21 0305   Clamp  "Status/Tolerance no abdominal distention;no abdominal discomfort;no emesis;no nausea;no residual;no restlessness 12/22/21 0305   Suction Setting/Drainage Method suction at the bedside 12/22/21 0305   Insertion Site Appearance no redness, warmth, tenderness, skin breakdown, drainage 12/22/21 0305   Flush/Irrigation flushed w/;water;no resistance met 12/22/21 0305   Feeding Type continuous;by pump 12/22/21 0305   Feeding Action feeding continued 12/22/21 0305   Current Rate (mL/hr) 10 mL/hr 12/21/21 1905   Goal Rate (mL/hr) 10 mL/hr 12/21/21 1905   Intake (mL) 100 mL 12/20/21 0800   Water Bolus (mL) 200 mL 12/21/21 1600   Rate Formula Tube Feeding (mL/hr) 10 mL/hr 12/21/21 1905   Formula Name Isosource 12/22/21 0305   Intake (mL) - Formula Tube Feeding 20 12/21/21 1800   Residual Amount (ml) 0 ml 12/21/21 1905            Urethral Catheter 12/21/21 0300 Temperature probe 16 Fr. (Active)   Site Assessment Clean;Intact 12/22/21 0305   Collection Container Urimeter 12/22/21 0305   Securement Method secured to top of thigh w/ adhesive device 12/22/21 0305   Catheter Care Performed yes 12/22/21 0305   Reason for Continuing Urinary Catheterization Critically ill in ICU and requiring hourly monitoring of intake/output 12/22/21 0305   CAUTI Prevention Bundle StatLock in place w 1" slack;Intact seal between catheter & drainage tubing;Drainage bag/urimeter off the floor;Sheeting clip in use;No dependent loops or kinks;Drainage bag/urimeter not overfilled (<2/3 full);Drainage bag/urimeter below bladder 12/21/21 1905   Output (mL) 20 mL 12/22/21 0700     Physical Exam    AAOx3, PERRL, EOMI, FS, TM, 5/5 throughout, SILT.  Abdomen soft  No resp distress  Extremities intact      Significant Labs:  Recent Labs   Lab 12/29/21  1124   *      K 3.7   *   CO2 22*   BUN 23   CREATININE 0.7   CALCIUM 8.3*   MG 1.7     Recent Labs   Lab 12/29/21  1124   WBC 19.62*   HGB 9.0*   HCT 29.7*   *     No results for " input(s): LABPT, INR, APTT in the last 48 hours.  Microbiology Results (last 7 days)     Procedure Component Value Units Date/Time    Blood culture [[id number]] Collected: 12/21/21 1032    Order Status: Completed Specimen: Blood from Peripheral, Antecubital, Right Updated: 12/26/21 1212     Blood Culture, Routine No growth after 5 days.    Narrative:      Blood cultures x 2 different sites. 4 bottles total. Please  draw cultures before administering antibiotics.    Blood culture [[id number]] Collected: 12/21/21 1010    Order Status: Completed Specimen: Blood from Peripheral, Forearm, Right Updated: 12/26/21 1212     Blood Culture, Routine No growth after 5 days.    Narrative:      Blood cultures from 2 different sites. 4 bottles total.  Please draw before starting antibiotics.    Culture, Respiratory with Gram Stain [[id number]]  (Abnormal)  (Susceptibility) Collected: 12/21/21 1015    Order Status: Completed Specimen: Respiratory from Tracheal Aspirate Updated: 12/24/21 1001     Respiratory Culture No S aureus or Pseudomonas isolated.      ESCHERICHIA COLI  Few       Gram Stain (Respiratory) <10 epithelial cells per low power field.     Gram Stain (Respiratory) Many WBC's     Gram Stain (Respiratory) Few Gram positive cocci        All pertinent labs from the last 24 hours have been reviewed.    Significant Diagnostics:  I have reviewed and interpreted all pertinent imaging results/findings within the past 24 hours.

## 2021-12-30 NOTE — PLAN OF CARE
[facility]  Discharge Reassessment    Primary Care Provider: Bhargav Hirsch MD    Expected Discharge Date: 1/3/2022     Per MD, patient to stay in ICU one more day and then should be rehab ready.  Msg left on V/M and in [facility] for [facility] at [facility]    Reassessment (most recent)     Discharge Reassessment - 12/30/21 1607        Discharge Reassessment    Assessment Type Discharge Planning Reassessment     Did the patient's condition or plan change since previous assessment? No     Communicated LETICIA with patient/caregiver Yes     Discharge Plan A Rehab     Discharge Plan B Rehab     DME Needed Upon Discharge  none     Discharge Barriers Identified None     Why the patient remains in the hospital Requires continued medical care                 Charlette Benitez RN, CCRN-K, [facility]  Neuro-Critical Care   [phone]

## 2021-12-30 NOTE — PT/OT/SLP PROGRESS
"Physical Therapy Treatment    Patient Name:  Selma Lux   MRN:  [id number]    Recommendations:     Discharge Recommendations:  rehabilitation facility   Discharge Equipment Recommendations:  (TBD)   Barriers to discharge: Increased level of assist and Inaccessible home    Assessment:     Selma Lux is a 86 y.o. female admitted with a medical diagnosis of SDH (subdural hematoma).  She presents with the following impairments/functional limitations: weakness,impaired endurance,impaired self care skills,impaired functional mobilty,gait instability,impaired balance,impaired cognition,decreased upper extremity function,decreased lower extremity function,decreased safety awareness,impaired coordination. These deficits affect their roles and responsibilities in which they were able to complete prior to admit. Selma Lux would continue to benefit from acute PT intervention to improve quality of life and focus on recovery of impairments. Once medically stable, recommending pt discharge to Inpatient Rehabilitation Facility. Patient more confused this date, not oriented to place. Limited session due to patient fatigue after working with OT and confusion with limited command following.    Rehab Prognosis: Good; patient continues to benefit from acute skilled PT services to address these deficits and reach maximum level of function.  Patient remains most appropriate to discharge to rehabilitation facility.  Recent Surgery: * No surgery found *      Plan:     During this hospitalization, patient to be seen 3 x/week to address the identified rehab impairments via gait training,therapeutic activities,therapeutic exercises,neuromuscular re-education and progress toward the following goals:    · Plan of Care Expires:  01/27/22    Subjective     Chief Complaint: Lateral abdominal pain  Patient/Family Comments/Goals: "I wouldn't have agreed to any of this if I knew you were going to leave me on the street"  Pain/Comfort:  · Pain " "Rating 1:  (not rated)  · Location - Side 1: Bilateral  · Location - Orientation 1: lower  · Location 1:  ("my sides, above my hip")  · Pain Addressed 1: Distraction  · Pain Rating Post-Intervention 1:  (not rated)    Objective:     Communicated with RN prior to session. Patient found left sidelying with HOB elevated with telemetry,pulse ox (continuous),PICC line,pressure relief boots,SCD,blood pressure cuff,bed alarm upon PT entry to room.     General Precautions: Standard, aspiration,fall   Orthopedic Precautions:N/A   Braces: N/A    Functional Mobility:  · Bed Mobility:     · Rolling Left:  maximal assistance  · Rolling Right: maximal assistance  · Scooting: maximal assistance  · Supine to Sit: maximal assistance of 2 persons  · Sit to Supine: maximal assistance of 2 persons  · Transfers:     · Sit to Stand: maximal assistance of 2 persons with hand-held assist  · Gait: Deferred due to poor standing balance   · Balance:   · Static Sitting: Poor, able to maintain for 6 minute(s) with maximal assistance progressing to minimal assistance with verbal and tactile cuing, demonstrates posterior lean, with support removed patient demonstrates significant LOB outside LYNDSEY  · Dynamic Sitting: Poor: Patient unable to accept challenge or move without loss of balance, maximal assistance  · Static Standing: Poor, able to maintain for 8 seconds with maximal assistance of 2 persons  · Dynamic Standing: Poor: Patient unable to accept challenge or move without loss of balance, maximal assistance of 2 persons    AM-PAC 6 CLICK MOBILITY  Turning over in bed (including adjusting bedclothes, sheets and blankets)?: 2  Sitting down on and standing up from a chair with arms (e.g., wheelchair, bedside commode, etc.): 2  Moving from lying on back to sitting on the side of the bed?: 2  Moving to and from a bed to a chair (including a wheelchair)?: 1  Need to walk in hospital room?: 1  Climbing 3-5 steps with a railing?: 1  Basic Mobility " Total Score: 9     Therapeutic Activities and Exercises:  Patient educated on role of acute care PT and PT POC  Whiteboard updated   Attempted to provide orientation to place, patient resistant and beginning to become agitated. RN notified of confusion  Encountered with soiled salvador pads, extra time spent changing pads and repositioning    Patient left left sidelying with HOB elevated with all lines intact, call button in reach, RN notified, bed alarm on and family present.    GOALS:   Multidisciplinary Problems     Physical Therapy Goals        Problem: Physical Therapy Goal    Goal Priority Disciplines Outcome Goal Variances Interventions   Physical Therapy Goal     PT, PT/OT Ongoing, Progressing     Description: Goals to be met by: 1/10/2022    Patient will increase functional independence with mobility by performin. Supine to sit with minimum assistance  2. Sit to supine with minimum assistance  3. Sit to stand transfer with minimum assistance  4. Bed to chair transfer with moderate assistance using LRAD as needed  5. Gait  x 5 feet with moderate assistance using LRAD as needed  6. Ascend/descend 5 stair with bilateral handrails maximal assistance using LRAD as needed  7. Lower extremity exercise program x10 reps per handout, with independence                      Time Tracking:     PT Received On: 21  PT Start Time: 1401     PT Stop Time: 1424  PT Total Time (min): 23 min     Billable Minutes: Therapeutic Activity 15 min and Neuromuscular Re-education 8 min    Treatment Type: Treatment  PT/PTA: PT     PTA Visit Number: 0     2021    Therapy tech utilized for session to maximize physical performance and safety

## 2021-12-30 NOTE — ASSESSMENT & PLAN NOTE
- SDH with brain compression  - non surgical  - was on apixaban and reversed with PCC on admission    Plan:  - Vimpat 100 mg BID  - f/u with neurosurgery clinic with repeat CT head

## 2021-12-30 NOTE — PLAN OF CARE
[facility] Care Plan    POC reviewed with Selma Lux and daughter at 0300. Pt verbalized understanding. Daughter verbalized understanding. Questions and concerns addressed.Transfer orders in place. No acute events overnight. Pt progressing toward goals. Will continue to monitor. See below and flowsheets for full assessment and VS info.    -Bladder scan x2; straight cath x1 for PVR >350 cc      Neuro:  [place] Coma Scale  Best Eye Response: 4-->(E4) spontaneous  Best Motor Response: 6-->(M6) obeys commands  Best Verbal Response: 5-->(V5) oriented  [place] Coma Scale Score: 15  Assessment Qualifiers: patient not sedated/intubated,no eye obstruction present  Pupil PERRLA: yes     24hr Temp:  [97.8 °F (36.6 °C)-98.1 °F (36.7 °C)]     CV:   Rhythm: sinus tachycardia  BP goals:   SBP < 160  MAP > 65    Resp:   O2 Device (Oxygen Therapy): room air    Plan: N/A    GI/:  SCOOBY Total Score: 20  Diet/Nutrition Received: mechanical/dental soft  Last Bowel Movement: 12/29/21  Voiding Characteristics: unable to void    Intake/Output Summary (Last 24 hours) at 12/30/2021 0031  Last data filed at 12/29/2021 2305  Gross per 24 hour   Intake 380 ml   Output 400 ml   Net -20 ml     Unmeasured Output  Urine Occurrence: 1  Stool Occurrence: 1  Pad Count: 1    Labs/Accuchecks:  Recent Labs   Lab 12/29/21  1124   WBC 19.62*   RBC 3.13*   HGB 9.0*   HCT 29.7*   *      Recent Labs   Lab 12/29/21  1124      K 3.7   CO2 22*   *   BUN 23   CREATININE 0.7   ALKPHOS 62   ALT 28   AST 21   BILITOT 1.3*    No results for input(s): PROTIME, INR, APTT, HEPANTIXA in the last 168 hours. No results for input(s): CPK, CPKMB, TROPONINI, MB in the last 168 hours.    Electrolytes: No replacement orders  Accuchecks: ACHS    Gtts:       LDA/Wounds:  Lines/Drains/Airways       Peripherally Inserted Central Catheter Line              PICC Double Lumen 12/15/21 1616 right basilic 14 days              Airway                 Airway Anesthesia  12/21/21 8 days                  Wounds: No  Wound care consulted: No

## 2021-12-30 NOTE — ASSESSMENT & PLAN NOTE
An 86F with on eliquis s/p head trauma 1 week prior now with small aSDH and tSAH and AMS c/b PEA arrest 12/20    Continue ICU care   Neurocheck per protocol   Work up:  --CTH: R temp/paretial aSDH 5-6mm thickness, 3-5mm MLS, stable on repeat as well as imaging post PEA arrest  --Multiple Follow up CT stable, last 12/27  AED per Epilepsy  SBP<150  Na >135  Eliquis now resumed and CTH stable once on elequis.     Medical management per NCC  No neurosurigcal intervention at this time.  Plan for TTF to  once medically stable. CTH before discharge. FU to be scheduled by our department  Please page NSGY immediately for any changes in neuro status

## 2021-12-30 NOTE — PROGRESS NOTES
[facility]  Neurosurgery  Progress Note    Subjective:     History of Present Illness: 86-year-old female with past medical history of TIA comes to the emergency department for AMS.  She was found on the floor about 1 hour ago only responding to painful stimuli.  Patient occasionally says her name.GCS 12 noted for EMS.  Patient does take blood thinners. She had a mechanical fall out of bed on Thursday hitting her head and has been complaining of intermittent headaches since. Her last dose of eliquis was on 12/11. NSGY consulted for CTH with SDH      Post-Op Info:  * No surgery found *         Interval History:  NAEON. Increased delirium this am. Otherwise stable, pending TTF to     Medications:  Continuous Infusions:    Scheduled Meds:   acetylcysteine 100 mg/ml (10%)  4 mL Nebulization QID WAKE    amLODIPine  10 mg Oral Daily    apixaban  5 mg Oral BID    atorvastatin  40 mg Oral Daily    famotidine  20 mg Oral BID    hydrOXYchloroQUINE  200 mg Oral BID    lacosamide  100 mg Oral Q12H    melatonin  12 mg Oral Nightly    montelukast  10 mg Oral Daily    mycophenolate mofetil  500 mg Oral BID    predniSONE  30 mg Oral Daily    QUEtiapine  12.5 mg Oral QHS    senna-docusate 8.6-50 mg  1 tablet Oral Daily    silodosin  4 mg Oral Daily    sodium chloride 0.9%  10 mL Intravenous Q6H    sodium chloride 3%  4 mL Nebulization Q4H     PRN Meds:acetaminophen, albuterol-ipratropium, bisacodyL, calcium gluconate IVPB, calcium gluconate IVPB, calcium gluconate IVPB, dextrose 50%, glucagon (human recombinant), insulin aspart U-100, labetalol, magnesium sulfate IVPB, magnesium sulfate IVPB, ondansetron, potassium chloride in water **AND** potassium chloride in water **AND** potassium chloride in water, racepinephrine, Flushing PICC Protocol **AND** sodium chloride 0.9% **AND** sodium chloride 0.9%       Objective:     Weight: 67 kg (147 lb 11.3 oz)  Body mass index is 25.35 kg/m².  Vital Signs  "(Most Recent):  Temp: 98.2 °F (36.8 °C) (12/30/21 0305)  Pulse: 103 (12/30/21 0817)  Resp: (!) 25 (12/30/21 0817)  BP: 111/82 (12/30/21 0605)  SpO2: 100 % (12/30/21 0817) Vital Signs (24h Range):  Temp:  [97.8 °F (36.6 °C)-98.2 °F (36.8 °C)] 98.2 °F (36.8 °C)  Pulse:  [] 103  Resp:  [16-28] 25  SpO2:  [100 %] 100 %  BP: (111-139)/(56-82) 111/82              NG/OG Tube 12/17/21 1405 Left nostril (Active)   Placement Check placement verified by aspirate characteristics 12/22/21 0305   Tolerance no signs/symptoms of discomfort 12/22/21 0305   Securement secured to nostril center 12/22/21 0305   Clamp Status/Tolerance no abdominal distention;no abdominal discomfort;no emesis;no nausea;no residual;no restlessness 12/22/21 0305   Suction Setting/Drainage Method suction at the bedside 12/22/21 0305   Insertion Site Appearance no redness, warmth, tenderness, skin breakdown, drainage 12/22/21 0305   Flush/Irrigation flushed w/;water;no resistance met 12/22/21 0305   Feeding Type continuous;by pump 12/22/21 0305   Feeding Action feeding continued 12/22/21 0305   Current Rate (mL/hr) 10 mL/hr 12/21/21 1905   Goal Rate (mL/hr) 10 mL/hr 12/21/21 1905   Intake (mL) 100 mL 12/20/21 0800   Water Bolus (mL) 200 mL 12/21/21 1600   Rate Formula Tube Feeding (mL/hr) 10 mL/hr 12/21/21 1905   Formula Name Isosource 12/22/21 0305   Intake (mL) - Formula Tube Feeding 20 12/21/21 1800   Residual Amount (ml) 0 ml 12/21/21 1905            Urethral Catheter 12/21/21 0300 Temperature probe 16 Fr. (Active)   Site Assessment Clean;Intact 12/22/21 0305   Collection Container Urimeter 12/22/21 0305   Securement Method secured to top of thigh w/ adhesive device 12/22/21 0305   Catheter Care Performed yes 12/22/21 0305   Reason for Continuing Urinary Catheterization Critically ill in ICU and requiring hourly monitoring of intake/output 12/22/21 0305   CAUTI Prevention Bundle StatLock in place w 1" slack;Intact seal between catheter & drainage " tubing;Drainage bag/urimeter off the floor;Sheeting clip in use;No dependent loops or kinks;Drainage bag/urimeter not overfilled (<2/3 full);Drainage bag/urimeter below bladder 12/21/21 1905   Output (mL) 20 mL 12/22/21 0700     Physical Exam    AAOx3, PERRL, EOMI, FS, TM, 5/5 throughout, SILT.  Abdomen soft  No resp distress  Extremities intact      Significant Labs:  Recent Labs   Lab 12/29/21  1124   *      K 3.7   *   CO2 22*   BUN 23   CREATININE 0.7   CALCIUM 8.3*   MG 1.7     Recent Labs   Lab 12/29/21  1124   WBC 19.62*   HGB 9.0*   HCT 29.7*   *     No results for input(s): LABPT, INR, APTT in the last 48 hours.  Microbiology Results (last 7 days)     Procedure Component Value Units Date/Time    Blood culture [[id number]] Collected: 12/21/21 1032    Order Status: Completed Specimen: Blood from Peripheral, Antecubital, Right Updated: 12/26/21 1212     Blood Culture, Routine No growth after 5 days.    Narrative:      Blood cultures x 2 different sites. 4 bottles total. Please  draw cultures before administering antibiotics.    Blood culture [[id number]] Collected: 12/21/21 1010    Order Status: Completed Specimen: Blood from Peripheral, Forearm, Right Updated: 12/26/21 1212     Blood Culture, Routine No growth after 5 days.    Narrative:      Blood cultures from 2 different sites. 4 bottles total.  Please draw before starting antibiotics.    Culture, Respiratory with Gram Stain [[id number]]  (Abnormal)  (Susceptibility) Collected: 12/21/21 1015    Order Status: Completed Specimen: Respiratory from Tracheal Aspirate Updated: 12/24/21 1001     Respiratory Culture No S aureus or Pseudomonas isolated.      ESCHERICHIA COLI  Few       Gram Stain (Respiratory) <10 epithelial cells per low power field.     Gram Stain (Respiratory) Many WBC's     Gram Stain (Respiratory) Few Gram positive cocci        All pertinent labs from the last 24 hours have been reviewed.    Significant Diagnostics:  I  have reviewed and interpreted all pertinent imaging results/findings within the past 24 hours.    Assessment/Plan:     * SDH (subdural hematoma)  An 86F with on eliquis s/p head trauma 1 week prior now with small aSDH and tSAH and AMS c/b PEA arrest 12/20    Continue ICU care   Neurocheck per protocol   Work up:  --CTH: R temp/paretial aSDH 5-6mm thickness, 3-5mm MLS, stable on repeat as well as imaging post PEA arrest  --Multiple Follow up CT stable, last 12/27  AED per Epilepsy  SBP<150  Na >135  Eliquis now resumed and CTH stable once on elequis.     Medical management per NCC  No neurosurigcal intervention at this time.  Plan for TTF to  once medically stable. CTH before discharge. FU to be scheduled by our department  Please page NSGY immediately for any changes in neuro status          Marques Ruelas MD  Neurosurgery  [facility]

## 2021-12-30 NOTE — PROGRESS NOTES
Progress Note  Hospital Medicine    Patient: Selma Lux [id number]  Date of Service: 12/31/2021  [place] [facility] Tim Castillo MD  Length of Stay:  LOS: 18 days   Admission Date: 12/13/2021    SUBJECTIVE:     Follow-up For:  SDH (subdural hematoma)    HPI/Interval history (See H&P for complete P,F,SHx) :   The patient is an 86-year-old female with past medical history of TIA, RA and Afib of Eliquis admitted to [facility] with Right SDH.  She was found on the floor this morning only responding to painful stimuli.  Patient occasionally says her name.GCS 12 noted for EMS.  PCC administered. She had a mechanical fall out of bed on Thursday hitting her head and has been complaining of intermittent headaches since. Her last dose of eliquis was on 12/11. Pending repeat CT head. NSGY consulted. In addition patient was found to be hyponatremic at 118-central line placed and hypertonic saline infusion initiated. Pending repeat CT head. Patient admitted to [facility] for close monitoring and higher level of care.         Hospital Course: 12/13/2021: patient admitted to [facility] s/p fall on 12/11 on Eliquis  12/14/2021 wheezing, prolonged expiratory phase.   12/15/2021 copious respiratory secretions. Remains encephalopathic. Review EEG  12/16/2021 improved respiratory secretions. D/c nasal tracheal airway if minimal secretions.   12/17/2021 restless.   12/18/2021 lethargic, rising BUN/Cr ratio. Fluid resuscitation. Received flumazenil for BDZ   12/19/2021 encephalopathy overnight. Work up neg so far. BUN/Cr >20 from volume contraction.   12/20/2021 left facial twitching overnight. Loaded with vimpat.   12/21/2021 event overnight cardiac arrest/PEA likely related to respiratory distress. Intubated.   12/22/2021 Add precedex to enable vent weaning.  12/23/2021   extubation trial today  12/24/2021 extubated over cook catheter due to concern for laryngeal edema. Successfully extubated this am. Close monitoring.   12/25/2021 agitated  delirium overnight.   12/26/2021 increase cough assist frequency.   12/27: pulmonary toilette, prednisone, daughter updated at bedside, change diet to puree  12/28: resume home apixaban, rescan head tonight, continue pulmonary toilette, can probably step down in AM, melatonin at 22 hr  12/29/2021 CT head stable./ Pureed diet started. Stepping down to medicine.    Interval history  12/30  Increased delirium this am. Eliquis now resumed and CT head stable on elequis.  s/p neurosurgery f/u. No neurosurigcal intervention at this time. plan for CTH head before discharge. SLP recs Mechanical soft, Liquid Diet Level: Thin   urinary retention overrnight -straight cath x1 for PVR >350 cc  12/31  Transfer to hospital medicine. delirium improving . leucocytosis tending down to  17   P replaced. neurocritical care recs to continue vimpat x 3 months and f/u in epilepsy clinic. started on robitussin for cough     Review of Systems:   Pain scale:  Constitutional:  fever,  chills, headache, vision loss, hearing loss, weight loss, + Generalized weakness, falls, loss of smell, loss of taste, poor appetite,  sore throat , + immunocompromised state.   Respiratory:+  cough, shortness of breath.   Cardiovascular: chest pain, dizziness, palpitations, orthopnea, swelling of feet, syncope  Gastrointestinal: nausea, vomiting, abdominal pain, diarrhea, black stool,  beeding per rectum,  change in bowel habits + trouble swallowing  Genitourinary: hematuria, dysuria, urgency, frequency  Integument/Breast: rash,  pruritis  Hematologic/Lymphatic: easy bruising, lymphadenopathy  Musculoskeletal: arthralgias , myalgias, back pain, neck pain, knee pain  Neurological: + confusion, seizures, tremors, slurred speech  Behavioral/Psych:  depression, anxiety, auditory or visual hallucinations     OBJECTIVE:     Physical Exam:  Body mass index is 25.35 kg/m².    Constitutional: Appears well-developed and well-nourished.   Head: Normocephalic and  atraumatic.   Neck: Normal range of motion. Neck supple.   Cardiovascular: Normal heart rate.  Regular heart rhythm.  Pulmonary/Chest: Effort normal.   Abdominal: No distension.  No tenderness  Musculoskeletal: Normal range of motion. No edema.   Neurological: Alert and oriented to person, place, and time. power 4/5. able to move bilateral upper and lower extremities without limitation   Skin: Skin is warm and dry.   Psychiatric: Normal mood and affect. Behavior is normal.                  Vital Signs  Temp: 98.4 °F (36.9 °C) (12/31/21 1542)  Pulse: 100 (12/31/21 1900)  Resp: 17 (12/31/21 1542)  BP: 131/65 (12/31/21 1542)  SpO2: 97 % (12/31/21 1542)     24 Hour VS Range    Temp:  [97.2 °F (36.2 °C)-98.5 °F (36.9 °C)]   Pulse:  []   Resp:  [16-43]   BP: (105-155)/(56-82)   SpO2:  [97 %-100 %]     Intake/Output Summary (Last 24 hours) at 12/31/2021 1936  Last data filed at 12/31/2021 1200  Gross per 24 hour   Intake 600 ml   Output 500 ml   Net 100 ml         I/O This Shift:  No intake/output data recorded.    Wt Readings from Last 3 Encounters:   12/19/21 67 kg (147 lb 11.3 oz)       I have personally reviewed the vitals and recorded Intake/Output     Laboratory/Diagnostic Data:    CBC/Anemia Labs: Coags:    Recent Labs   Lab 12/27/21  0953 12/29/21  1124 12/31/21  0956   WBC 22.18* 19.62* 17.82*   HGB 9.7* 9.0* 9.3*   HCT 33.2* 29.7* 29.6*   * 147* 108*   MCV 97 95 93   RDW 17.2* 17.5* 17.5*    No results for input(s): PT, INR, APTT in the last 168 hours.     Chemistries: ABG:   Recent Labs   Lab 12/26/21  0028 12/29/21  1124 12/31/21  0956    142 140   K 3.7 3.7 3.6    113* 111*   CO2 21* 22* 21*   BUN 29* 23 16   CREATININE 0.7 0.7 0.7   CALCIUM 8.4* 8.3* 8.3*   PROT 4.8* 4.8* 4.8*   BILITOT 1.5* 1.3* 1.3*   ALKPHOS 57 62 69   ALT 30 28 29   AST 26 21 24   MG  --  1.7 1.6   PHOS  --  1.7* 2.2*    No results for input(s): PH, PCO2, PO2, HCO3, POCSATURATED, BE in the last 168 hours.     POCT  Glucose: HbA1c:    Recent Labs   Lab 12/28/21  2227 12/29/21  2139 12/30/21  2113 12/31/21  0813 12/31/21  1131 12/31/21  1653   POCTGLUCOSE 178* 127* 137* 84 127* 199*    Hemoglobin A1C   Date Value Ref Range Status   12/13/2021 5.3 4.0 - 5.6 % Final     Comment:     ADA Screening Guidelines:  5.7-6.4%  Consistent with prediabetes  >or=6.5%  Consistent with diabetes    High levels of fetal hemoglobin interfere with the HbA1C  assay. Heterozygous hemoglobin variants (HbS, HgC, etc)do  not significantly interfere with this assay.   However, presence of multiple variants may affect accuracy.          Cardiac Enzymes: Ejection Fractions:    No results for input(s): CPK, CPKMB, MB, TROPONINI in the last 72 hours. EF   Date Value Ref Range Status   12/13/2021 65 % Final          No results for input(s): COLORU, APPEARANCEUA, PHUR, SPECGRAV, PROTEINUA, GLUCUA, KETONESU, BILIRUBINUA, OCCULTUA, NITRITE, UROBILINOGEN, LEUKOCYTESUR, RBCUA, WBCUA, BACTERIA, SQUAMEPITHEL, HYALINECASTS in the last 48 hours.    Invalid input(s): WRIGHTSUR    Procalcitonin (ng/mL)   Date Value   12/22/2021 0.67 (H)   12/21/2021 0.59 (H)     No results found for: BNP  No results found for: CRP, SEDRATE  No results found for: DDIMER  No results found for: FERRITIN  No results found for: LDH  CPK (U/L)   Date Value   12/13/2021 123     No results found for this or any previous visit.  POC Rapid COVID (no units)   Date Value   12/13/2021 Negative       Microbiology labs for the last week  Microbiology Results (last 7 days)     Procedure Component Value Units Date/Time    Blood culture [[id number]] Collected: 12/21/21 1032    Order Status: Completed Specimen: Blood from Peripheral, Antecubital, Right Updated: 12/26/21 1212     Blood Culture, Routine No growth after 5 days.    Narrative:      Blood cultures x 2 different sites. 4 bottles total. Please  draw cultures before administering antibiotics.    Blood culture [[id number]] Collected: 12/21/21 1010     Order Status: Completed Specimen: Blood from Peripheral, Forearm, Right Updated: 12/26/21 1212     Blood Culture, Routine No growth after 5 days.    Narrative:      Blood cultures from 2 different sites. 4 bottles total.  Please draw before starting antibiotics.          Reviewed and noted in plan where applicable- Please see chart for full lab data.    Lines/Drains:  PICC Double Lumen 12/15/21 1616 right basilic (Active)   Verification by X-ray Yes 12/30/21 0305   Site Assessment No drainage;No redness;No swelling;No warmth 12/30/21 0305   Extremity Assessment Distal to IV No abnormal discoloration;No redness;No swelling;No warmth 12/30/21 0305   Line Securement Device Secured with sutureless device 12/30/21 0305   Dressing Type Biopatch in place;Central line dressing 12/30/21 0305   Dressing Status Clean;Dry;Intact 12/30/21 0305   Dressing Intervention Integrity maintained 12/30/21 0305   Date on Dressing 12/30/21 12/30/21 0305   Dressing Due to be Changed 12/06/21 12/30/21 0305   Left Lumen Patency/Care Flushed w/o difficulty 12/30/21 0305   Right Lumen Patency/Care Flushed w/o difficulty 12/30/21 0305   Current Insertion Depth (cm) 33 cm 12/29/21 1505   Current Exposed Catheter (cm) 0 cm 12/28/21 0705   Extremity Circumference (cm) 33 cm 12/15/21 1616   Waveform Normal 12/25/21 1505   Line Necessity Review Poor venous access 12/30/21 0305   Number of days: 14       Imaging  ECG Results          ECG 12 lead (Final result)  Result time 12/21/21 14:15:54    Final result by Interface, Lab In [facility] (12/21/21 14:15:54)             Narrative:    Test Reason : I63.9,    Vent. Rate : 126 BPM     Atrial Rate : 252 BPM     P-R Int : 000 ms          QRS Dur : 076 ms      QT Int : 330 ms       P-R-T Axes : 265 010 -53 degrees     QTc Int : 477 ms    Atrial flutter with 2:1 A-V conduction    Right bundle branch block  ST and T wave abnormality, consider inferior ischemia  Abnormal ECG  When compared with ECG of  13-DEC-2021 10:53,  Atrial flutter has replaced Sinus rhythm  ST now depressed in Lateral leads  T wave inversion now evident in Inferior leads  Confirmed by Mukesh Cunningham MD ([id number]) on 12/21/2021 2:15:46 PM    Referred By: VINAY   SELF           Confirmed By:Mukesh Cunningham MD                           EKG 12-lead (Final result)  Result time 12/13/21 15:34:33    Final result by Interface, Lab In [facility] (12/13/21 15:34:33)             Narrative:    Test Reason : S06.5X9A,    Vent. Rate : 100 BPM     Atrial Rate : 100 BPM     P-R Int : 150 ms          QRS Dur : 088 ms      QT Int : 358 ms       P-R-T Axes : 058 -34 045 degrees     QTc Int : 461 ms    Normal sinus rhythm  Left axis deviation  Abnormal ECG  No previous ECGs available  Confirmed by Jerod MICHAEL MD ([id number]) on 12/13/2021 3:34:24 PM    Referred By: VINAY   SELF           Confirmed By:Jerod MICHAEL MD                            Results for orders placed during the hospital encounter of 12/13/21    Echo    Interpretation Summary  · Technically difficult study  · The left ventricle is normal in size with concentric hypertrophy and normal systolic function. The estimated ejection fraction is 65%.  · Normal right ventricular size with normal right ventricular systolic function.  · Normal left ventricular diastolic function.  · Normal central venous pressure (3 mmHg).      CT Head Without Contrast  Narrative: EXAMINATION:  CT HEAD WITHOUT CONTRAST    CLINICAL HISTORY:  Stroke, follow up;    TECHNIQUE:  Low dose axial CT images obtained throughout the head without intravenous contrast. Sagittal and coronal reconstructions were performed.    COMPARISON:  CT head 12/27/2021    FINDINGS:  Right sided low-density subdural collection measuring 1.2 cm in maximal thickness with minimal extension along the right tentorial leaflet (coronal series 601, image 50).  It appears stable in size without evidence of hyperdense component to suggest acute hemorrhage.   Persistent mass effect resulting in regional sulcal effacement and minimal leftward midline shift measuring in the order of 3 mm at the level of the septum pellucidum, stable.    Patchy periventricular white matter hypoattenuation which is nonspecific, however likely related to chronic ischemic microvascular changes.  No new parenchymal hypoattenuation to suggest acute infarction.    Mass effect compresses the right lateral ventricle.  Asymmetrical prominence of the left temporal horn which could be related to generalized volume loss versus less likely ventricular entrapment and also stable.    Skull/extracranial contents (limited evaluation): No fracture.  Aerated secretions within the right sphenoid sinus.  The remaining paranasal sinuses and mastoid air cells are clear.    Soft tissue density filling the right external auditory canal a, likely represents impacted cerumen.  Bilateral ICAs calcific atherosclerosis.  Impression: Unchanged appearance of right sided subdural collection with associated mass effect and stable mild leftward midline shift.  No new extra-axial or parenchymal hemorrhage.    Mild right sphenoid sinus fluid.    Electronically signed by resident: Jesenia Euceda MD  Date:    12/28/2021  Time:    14:42    Electronically signed by: Carlos Bull  Date:    12/28/2021  Time:    15:03      Labs, Imaging, EKG and Diagnostic results from 12/31/2021 were reviewed.    Medications:  Medication list was reviewed and changes noted under Assessment/Plan.  No current facility-administered medications on file prior to encounter.     Current Outpatient Medications on File Prior to Encounter   Medication Sig Dispense Refill    amLODIPine (NORVASC) 10 MG tablet Take 10 mg by mouth once daily.      atorvastatin (LIPITOR) 40 MG tablet Take 40 mg by mouth once daily.      baclofen (LIORESAL) 10 MG tablet Take 10 mg by mouth 3 (three) times daily.      ELIQUIS 5 mg Tab Take 5 mg by mouth 2 (two) times daily.       furosemide (LASIX) 40 MG tablet Take 40 mg by mouth once daily.      gabapentin (NEURONTIN) 300 MG capsule Take 300 mg by mouth once daily.      hydrOXYchloroQUINE (PLAQUENIL) 200 mg tablet Take 200 mg by mouth 2 (two) times daily.      montelukast (SINGULAIR) 10 mg tablet Take 10 mg by mouth once daily.      mycophenolate (CELLCEPT) 500 mg Tab Take 500 mg by mouth 2 (two) times daily.      NURTEC 75 mg odt Take by mouth.      predniSONE (DELTASONE) 5 MG tablet Take 5 mg by mouth once daily.      sumatriptan (IMITREX) 50 MG tablet Take by mouth.       Scheduled Medications:  acetylcysteine 100 mg/ml (10%), 4 mL, Nebulization, QID WAKE  amLODIPine, 10 mg, Oral, Daily  apixaban, 5 mg, Oral, BID  atorvastatin, 40 mg, Oral, Daily  famotidine, 20 mg, Oral, BID  hydrOXYchloroQUINE, 200 mg, Oral, BID  lacosamide, 100 mg, Oral, Q12H  melatonin, 12 mg, Oral, Nightly  montelukast, 10 mg, Oral, Daily  mycophenolate mofetil, 500 mg, Oral, BID  potassium, sodium phosphates, 2 packet, Oral, QID (AC & HS)  predniSONE, 30 mg, Oral, Daily  QUEtiapine, 12.5 mg, Oral, QHS  senna-docusate 8.6-50 mg, 1 tablet, Oral, Daily  silodosin, 4 mg, Oral, Daily  sodium chloride 0.9%, 10 mL, Intravenous, Q6H  sodium chloride 3%, 4 mL, Nebulization, Q4H      PRN: acetaminophen, albuterol-ipratropium, bisacodyL, dextrose 50%, glucagon (human recombinant), guaiFENesin 100 mg/5 ml, insulin aspart U-100, labetalol, ondansetron, racepinephrine, Flushing PICC Protocol **AND** sodium chloride 0.9% **AND** sodium chloride 0.9%  Infusions:   Estimated Creatinine Clearance: 56.3 mL/min (based on SCr of 0.7 mg/dL).    ASSESSMENT/PLAN:   Overview:  Selma Lux is a 86 y.o. female with medical history significant for     Active Hospital Problems    Diagnosis  POA    *SDH (subdural hematoma) [S06.5X9A]-SDH with brain compression  -non surgical  -repeat imaging reviewed stable  - was on apixaban and reversed with PCC on admission  - apixaban resumed  per NSGY, repeat CT head stable  - Neurosurgery follow up in clinic with repeat scam, ordered.  12/30  Increased delirium this am. Eliquis now resumed and CT head stable on elequis.  s/p neurosurgery f/u. No neurosurigcal intervention at this time. plan for CTH head before discharge  12/31  Transfer to hospital medicine. delirium improving .neurocritical care recs to continue vimpat x 3 months and f/u in epilepsy clinic        Metabolic encephalopathy- secondary to above   Patient with acute delirium. Will avoid narcotics/benzos that are known to worsen condition.  PRN antipsychotics to limit behaviors of self harm. Monitor closely.  12/30   Improving  Continues to perseverate on the dates  Encourage sleep at night and awake during the day  Delirium precautions  Melatonin at night   Prn quetiapine  12/31 AAOx3,      Dysphagia - seconary to above  12/30 . SLP recs Mechanical soft, Liquid Diet Level: Thin       Urinary retention overrnight -straight cath x1 for PVR >350 cc       Yes    Cardiac arrest [I46.9] PEA arrest 12/20 with Respiratory arrest   Now post extubation    Anemia   Patient's with Normocytic anemia.. Hemoglobin stable. Etiology likely due to chronic disease .  Current CBC reviewed-    Recent Labs   Lab 12/27/21  0953 12/29/21  1124 12/31/21  0956   HGB 9.7* 9.0* 9.3*         Component Value Date/Time    MCV 93 12/31/2021 0956    RDW 17.5 (H) 12/31/2021 0956     Monitor CBC and transfuse if H/H drops below 7/21.   No    Twitching [R25.3]no acute issues   No    Stroke [I63.9]small aSDH and tSAH. s/p neurosurgery eval . as above   No    Hyponatremia [E87.1]resoved     Thrombocytopenia  Patient with thrombocytopenia   Recent Labs   Lab 12/27/21  0953 12/29/21  1124 12/31/21  0956   * 147* 108*   . Platelet counts uptrending.monitor  Yes    Age-related physical debility [R54]PT following . needs rehab   Yes    Leukocytosis [D72.829]     Patient with leucocytosis   Recent Labs   Lab  12/27/21  0953 12/29/21  1124 12/31/21  0956   WBC 22.18* 19.62* 17.82*     . Afebrile. BCX 2 12/21 NGTD , urine culture pending . likely secondary to steroids..  WBC counts stable.  sputum culture with E coli 12/21. completed 5 days of cefepime     Hypophosphatemia - Patient with phosphorus   Recent Labs   Lab 12/29/21  1124 12/31/21  0956   PHOS 1.7* 2.2*   . replaced. monitor  Yes    RA (rheumatoid arthritis) [M06.9]   --continue mycophenolate and hydroxychloroquine  --continue steroids  --will switch to prednisone as she was on at home, family requesting  --30mg so higher than home dose  Yes      Resolved Hospital Problems   No resolved problems to display.       Disposition- Rehab    Discharge Planning   LETICIA: 1/3/2022     Code Status: Full Code   Is the patient medically ready for discharge?: No    Reason for patient still in hospital (select all that apply): Treatment  Discharge Plan A: Rehab         DVT prophylaxis addressed with:  Eliquis.    [facility] Care    Level 3 00345 Total visit time was 35 minutes or greater with greater than 50% of time spent in counseling and coordination of care. .    We discussed in detail the plan of care, the patient's response to treatment, the discharge plan.  Total time includes time spent reviewing the medical record, examining the patient, writing notes and communicating with other professionals.     Tim Castillo MD  Attending Staff Physician  [facility] Medicine  pager- [phone]  [facility] - [phone]

## 2021-12-30 NOTE — CONSULTS
Inpatient consult to Physical Medicine Rehab  Consult performed by: Frances Vazquez NP  Consult ordered by: Carson Kwan MD  Reason for consult: Assess rehab needs      Reviewed patient history and current admission.  Rehab team following.  Full consult to follow.    MENDOZA Kruger, FNP-C  Physical Medicine & Rehabilitation   12/30/2021

## 2021-12-30 NOTE — PT/OT/SLP PROGRESS
"Occupational Therapy   Treatment    Name: Selma Lux  MRN: [id number]  Admitting Diagnosis:  SDH (subdural hematoma)       Recommendations:     Discharge Recommendations: rehabilitation facility  Discharge Equipment Recommendations: TBD  Barriers to discharge: increased assistance needed    Assessment:     Selma Lux is a 86 y.o. female with a medical diagnosis of SDH (subdural hematoma). She presents with performance deficits including weakness,impaired endurance,impaired self care skills,impaired functional mobilty,impaired balance,decreased safety awareness,decreased lower extremity function,decreased upper extremity function,decreased coordination,impaired cognition,impaired cardiopulmonary response to activity. Pt drowsy, inconsistently following commands and disoriented, continues with weakness and impaired sitting balance affecting ability to complete ADLs and mobility tasks. Pt would continue to benefit from OT to increase functional independence and safety. Recommend rehab upon D/C pending progress and participation.     Rehab Prognosis:  Fair; patient would benefit from acute skilled OT services to address these deficits and reach maximum level of function.       Plan:     Patient to be seen 3 x/week to address the above listed problems via self-care/home management,therapeutic activities,therapeutic exercises,neuromuscular re-education,cognitive retraining  · Plan of Care Expires: 01/25/22  · Plan of Care Reviewed with: patient    Subjective     Pain/Comfort:  · Pain Rating 1: 0/10  · Pain Rating Post-Intervention 1: 0/10    Objective:     Communicated with: RN prior to session. Patient found with HOB elevated with telemetry,pulse ox (continuous),blood pressure cuff,SCD,pressure relief boots,peripheral IV,bed alarm upon OT entry to room, no family present. Session completed with rehab tech.  "There's a picture of that physician in a chair."  "It must be the end of March."    General Precautions: " Standard, fall,aspiration   Orthopedic Precautions:N/A   Braces: N/A     Occupational Performance:     Bed Mobility:    · Patient completed Scooting in supine to HOB with maximal assistance and 2 persons  · Patient completed Supine to Sit with maximal assistance  · Patient completed Sit to Supine with maximal assistance and 2 persons     Functional Mobility/Transfers:  · Not attempted due to poor sitting balance and fatigue sitting EOB    Activities of Daily Living:  · Grooming: maximal assistance to wipe face sitting EOB  · Lower Body Dressing: total assistance to don socks      AMPAC 6 Click ADL: 9    Treatment & Education:  Pt sat EOB ~8 minutes with Max A for postural control, drowsy with downward gaze and minimally using UEs for support-- demo posterior lean; completed B UE AAROM shld flex to ~90 deg, elbow flex/ext with assist for scap retraction; pt with increased work of breathing sitting EOB and reported fatigue, requesting to return to supine; assisted with repositioning and RN updated on pt's assistance levels    Patient left HOB elevated with all lines intact, call button in reach, bed alarm on and RN notifiedEducation:      GOALS:   Multidisciplinary Problems     Occupational Therapy Goals        Problem: Occupational Therapy Goal    Goal Priority Disciplines Outcome Interventions   Occupational Therapy Goal     OT, PT/OT Ongoing, Progressing    Description: Pt. Re-evaluated 12-26-21 Goals to be met 01-10-22  UBD with Min A  Bed mobility with Mod A  Grooming seated EOB with Min A  Toileting with Mod A  SQPT to bedside chair with Mod A  Pt. To follow 3/3 simple commands  Sit to stand with Mod A      Goals to be met by: 12/26/2021 (10 days)     Patient will increase functional independence with ADLs by performing:    UE Dressing with Moderate Assistance.  LE Dressing with Maximum Assistance.  Grooming while seated with Minimal Assistance.  Toileting from bedside commode with Minimal Assistance for hygiene  and clothing management.   Sitting at edge of bed x10 minutes with Minimal Assistance.  Rolling to Bilateral with Minimal Assistance.   Supine to sit with Minimal Assistance.  Stand pivot transfers with Moderate Assistance.  Step transfer with Moderate Assistance  Toilet transfer to bedside commode with Moderate Assistance.                     Time Tracking:     OT Date of Treatment: 12/30/21  OT Start Time: 1300  OT Stop Time: 1316  OT Total Time (min): 16 min    Billable Minutes:Therapeutic Activity 16 minutes    OT/GENE: OT          12/30/2021

## 2021-12-30 NOTE — PROGRESS NOTES
[facility]  Neurocritical Care  Progress Note    Admit Date: 12/13/2021  Service Date: 12/30/2021  Length of Stay: 17    Subjective:     Chief Complaint: SDH (subdural hematoma)    History of Present Illness:   The patient is an 86-year-old female with past medical history of TIA, RA and Afib of Eliquis admitted to [facility] with Right SDH.  She was found on the floor this morning only responding to painful stimuli.  Patient occasionally says her name.GCS 12 noted for EMS.  PCC administered. She had a mechanical fall out of bed on Thursday hitting her head and has been complaining of intermittent headaches since. Her last dose of eliquis was on 12/11. Pending repeat CT head. NSGY consulted. In addition patient was found to be hyponatremic at 118-central line placed and hypertonic saline infusion initiated. Pending repeat CT head. Patient admitted to [facility] for close monitoring and higher level of care.       Hospital Course: 12/13/2021: patient admitted to [facility] s/p fall on 12/11 on Eliquis  12/14/2021 wheezing, prolonged expiratory phase.   12/15/2021 copious respiratory secretions. Remains encephalopathic. Review EEG  12/16/2021 improved respiratory secretions. D/c nasal tracheal airway if minimal secretions.   12/17/2021 restless.   12/18/2021 lethargic, rising BUN/Cr ratio. Fluid resuscitation. Received flumazenil for BDZ   12/19/2021 encephalopathy overnight. Work up neg so far. BUN/Cr >20 from volume contraction.   12/20/2021 left facial twitching overnight. Loaded with vimpat.   12/21/2021 event overnight cardiac arrest/PEA likely related to respiratory distress. Intubated.   12/22/2021 Add precedex to enable vent weaning.  12/23/2021   extubation trial today  12/24/2021 extubated over cook catheter due to concern for laryngeal edema. Successfully extubated this am. Close monitoring.   12/25/2021 agitated delirium overnight.   12/26/2021 increase cough assist frequency.   12/27: pulmonary toilette,  prednisone, daughter updated at bedside, change diet to puree  12/28: resume home apixaban, rescan head tonight, continue pulmonary toilette, can probably step down in AM, melatonin at 22 hr  12/29/2021 CT head stable./ Pureed diet started. Stepping down to medicine.   12/30/2021 trouble with sleeping overnight, will utilize Seroquel tonight. Avoid Nebs at night.        Interval History:   >4 elements OR status of 3 inpatient conditions    Review of Systems   Constitutional: Negative for fever.   HENT: Positive for trouble swallowing. Negative for voice change.    Eyes: Negative for visual disturbance.   Respiratory: Negative for shortness of breath and wheezing.    Gastrointestinal: Negative for nausea.   Musculoskeletal: Negative for neck pain and neck stiffness.   Skin: Negative for rash.   Allergic/Immunologic: Positive for immunocompromised state.   Neurological: Negative for dizziness and headaches.   Psychiatric/Behavioral: Positive for confusion. Negative for agitation.     2 systems OR Unable to obtain a complete ROS due to level of consciousness.  Objective:     Vitals:  Temp: 98.2 °F (36.8 °C)  Pulse: 103  Rhythm: sinus tachycardia  BP: 111/82  MAP (mmHg): 91  Resp: (!) 25  SpO2: 100 %  O2 Device (Oxygen Therapy): room air    Temp  Min: 97.8 °F (36.6 °C)  Max: 98.2 °F (36.8 °C)  Pulse  Min: 97  Max: 108  BP  Min: 111/82  Max: 139/65  MAP (mmHg)  Min: 81  Max: 96  Resp  Min: 16  Max: 28  SpO2  Min: 100 %  Max: 100 %    12/29 0701 - 12/30 0700  In: 250 [P.O.:240; I.V.:10]  Out: 400 [Urine:400]   Unmeasured Output  Urine Occurrence: 1  Stool Occurrence: 1  Pad Count: 1       Physical Exam  Constitutional:       Appearance: Normal appearance.   HENT:      Head: Normocephalic.      Mouth/Throat:      Mouth: Mucous membranes are dry.   Eyes:      Extraocular Movements: Extraocular movements intact.      Pupils: Pupils are equal, round, and reactive to light.   Cardiovascular:      Rate and Rhythm: Normal rate.  "  Musculoskeletal:         General: Normal range of motion.      Cervical back: Normal range of motion.   Skin:     General: Skin is warm.   Neurological:      Mental Status: She is alert.      Comments: --sedation: none  --Mental Status: awake, follows simple commands, when asked the year she states "22...12..21" and perseverates on this, does not dose off this morning during assessment, improved from yesterday  --CN II-XII grossly intact.   --Pupils brisk bilat  --Motor: 4/5 throughout  --sensory: intact to soft touch and pain throughout  --Reflexes: not tested  --Gait: deferred           Medications:  Continuous Scheduledacetylcysteine 100 mg/ml (10%), 4 mL, QID WAKE  amLODIPine, 10 mg, Daily  apixaban, 5 mg, BID  atorvastatin, 40 mg, Daily  famotidine, 20 mg, BID  hydrOXYchloroQUINE, 200 mg, BID  lacosamide, 100 mg, Q12H  melatonin, 12 mg, Nightly  montelukast, 10 mg, Daily  mycophenolate mofetil, 500 mg, BID  predniSONE, 30 mg, Daily  QUEtiapine, 12.5 mg, QHS  senna-docusate 8.6-50 mg, 1 tablet, Daily  silodosin, 4 mg, Daily  sodium chloride 0.9%, 10 mL, Q6H  sodium chloride 3%, 4 mL, Q4H    PRNacetaminophen, 650 mg, Q6H PRN  albuterol-ipratropium, 3 mL, Q4H PRN  bisacodyL, 10 mg, Daily PRN  calcium gluconate IVPB, 1 g, PRN  calcium gluconate IVPB, 2 g, PRN  calcium gluconate IVPB, 3 g, PRN  dextrose 50%, 12.5 g, PRN  glucagon (human recombinant), 1 mg, PRN  insulin aspart U-100, 1-10 Units, Q4H PRN  labetalol, 5 mg, Q6H PRN  magnesium sulfate IVPB, 2 g, PRN  magnesium sulfate IVPB, 4 g, PRN  ondansetron, 4 mg, Q6H PRN  potassium chloride in water, 40 mEq, PRN   And  potassium chloride in water, 60 mEq, PRN   And  potassium chloride in water, 80 mEq, PRN  racepinephrine, 0.5 mL, Q4H PRN  sodium chloride 0.9%, 10 mL, PRN      Today I personally reviewed pertinent medications, lines/drains/airways, imaging, laboratory results, notably:    Diet  Diet Dysphagia Mechanical Soft (IDDSI Level 5) [facility]; " Thin  Diet Dysphagia Mechanical Soft (IDDSI Level 5) [facility]; Thin        Assessment/Plan:     Neuro  * SDH (subdural hematoma)  - SDH with brain compression  - non surgical  - was on apixaban and reversed with PCC on admission    Plan:  - Vimpat 100 mg BID  - f/u with neurosurgery clinic with repeat CT head    Delirium  Sundowning patten    Avoid nebs at night time  Melatonin at [address]  Seroquel 12.5 mg qhs, monitor QTc   Delirium precaution    Renal/  Hyponatremia  -diet  -prn fluids            The patient is being Prophylaxed for:  Venous Thromboembolism with: Mechanical or Chemical  Stress Ulcer with: H2B  Ventilator Pneumonia with: not applicable    Activity Orders          Diet Dysphagia Mechanical Soft (IDDSI Level 5) [facility]; Thin: Dysphagia 2 (Mechanical Soft Ground) starting at 12/29 1057    Turn patient starting at 12/19 1423        Full Code    Grace Block MD  Neurocritical Care  [facility]

## 2021-12-30 NOTE — SUBJECTIVE & OBJECTIVE
"Interval History:   >4 elements OR status of 3 inpatient conditions    Review of Systems   Constitutional: Negative for fever.   HENT: Positive for trouble swallowing. Negative for voice change.    Eyes: Negative for visual disturbance.   Respiratory: Negative for shortness of breath and wheezing.    Gastrointestinal: Negative for nausea.   Musculoskeletal: Negative for neck pain and neck stiffness.   Skin: Negative for rash.   Allergic/Immunologic: Positive for immunocompromised state.   Neurological: Negative for dizziness and headaches.   Psychiatric/Behavioral: Positive for confusion. Negative for agitation.     2 systems OR Unable to obtain a complete ROS due to level of consciousness.  Objective:     Vitals:  Temp: 98.2 °F (36.8 °C)  Pulse: 103  Rhythm: sinus tachycardia  BP: 111/82  MAP (mmHg): 91  Resp: (!) 25  SpO2: 100 %  O2 Device (Oxygen Therapy): room air    Temp  Min: 97.8 °F (36.6 °C)  Max: 98.2 °F (36.8 °C)  Pulse  Min: 97  Max: 108  BP  Min: 111/82  Max: 139/65  MAP (mmHg)  Min: 81  Max: 96  Resp  Min: 16  Max: 28  SpO2  Min: 100 %  Max: 100 %    12/29 0701 - 12/30 0700  In: 250 [P.O.:240; I.V.:10]  Out: 400 [Urine:400]   Unmeasured Output  Urine Occurrence: 1  Stool Occurrence: 1  Pad Count: 1       Physical Exam  Constitutional:       Appearance: Normal appearance.   HENT:      Head: Normocephalic.      Mouth/Throat:      Mouth: Mucous membranes are dry.   Eyes:      Extraocular Movements: Extraocular movements intact.      Pupils: Pupils are equal, round, and reactive to light.   Cardiovascular:      Rate and Rhythm: Normal rate.   Musculoskeletal:         General: Normal range of motion.      Cervical back: Normal range of motion.   Skin:     General: Skin is warm.   Neurological:      Mental Status: She is alert.      Comments: --sedation: none  --Mental Status: awake, follows simple commands, when asked the year she states "22...12..21" and perseverates on this, does not dose off this morning " during assessment, improved from yesterday  --CN II-XII grossly intact.   --Pupils brisk bilat  --Motor: 4/5 throughout  --sensory: intact to soft touch and pain throughout  --Reflexes: not tested  --Gait: deferred           Medications:  Continuous Scheduledacetylcysteine 100 mg/ml (10%), 4 mL, QID WAKE  amLODIPine, 10 mg, Daily  apixaban, 5 mg, BID  atorvastatin, 40 mg, Daily  famotidine, 20 mg, BID  hydrOXYchloroQUINE, 200 mg, BID  lacosamide, 100 mg, Q12H  melatonin, 12 mg, Nightly  montelukast, 10 mg, Daily  mycophenolate mofetil, 500 mg, BID  predniSONE, 30 mg, Daily  QUEtiapine, 12.5 mg, QHS  senna-docusate 8.6-50 mg, 1 tablet, Daily  silodosin, 4 mg, Daily  sodium chloride 0.9%, 10 mL, Q6H  sodium chloride 3%, 4 mL, Q4H    PRNacetaminophen, 650 mg, Q6H PRN  albuterol-ipratropium, 3 mL, Q4H PRN  bisacodyL, 10 mg, Daily PRN  calcium gluconate IVPB, 1 g, PRN  calcium gluconate IVPB, 2 g, PRN  calcium gluconate IVPB, 3 g, PRN  dextrose 50%, 12.5 g, PRN  glucagon (human recombinant), 1 mg, PRN  insulin aspart U-100, 1-10 Units, Q4H PRN  labetalol, 5 mg, Q6H PRN  magnesium sulfate IVPB, 2 g, PRN  magnesium sulfate IVPB, 4 g, PRN  ondansetron, 4 mg, Q6H PRN  potassium chloride in water, 40 mEq, PRN   And  potassium chloride in water, 60 mEq, PRN   And  potassium chloride in water, 80 mEq, PRN  racepinephrine, 0.5 mL, Q4H PRN  sodium chloride 0.9%, 10 mL, PRN      Today I personally reviewed pertinent medications, lines/drains/airways, imaging, laboratory results, notably:    Diet  Diet Dysphagia Mechanical Soft (IDDSI Level 5) [facility]; Thin  Diet Dysphagia Mechanical Soft (IDDSI Level 5) [facility]; Thin

## 2021-12-30 NOTE — ASSESSMENT & PLAN NOTE
Sundowning patten    Avoid nebs at night time  Melatonin at [address]  Seroquel 12.5 mg qhs, monitor QTc   Delirium precaution

## 2021-12-30 NOTE — PT/OT/SLP PROGRESS
"Speech Language Pathology Treatment    Patient Name:  Selma Lux   MRN:  [id number]  Admitting Diagnosis: SDH (subdural hematoma)    Recommendations:                 General Recommendations:  Dysphagia therapy and Cognitive-linguistic therapy  Diet recommendations:  Mechanical soft, Liquid Diet Level: Thin   Aspiration Precautions: 1 bite/sip at a time, Alternating bites/sips, Assistance with meals, Avoid talking while eating, Check for pocketing/oral residue, Eliminate distractions, Feed only when awake/alert, Frequent oral care, HOB to 90 degrees, Meds crushed in puree, Monitor for s/s of aspiration, Small bites/sips and Strict aspiration precautions   General Precautions: Standard, aspiration,fall  Communication strategies:  go to room if call light pushed    Subjective     "I know that this is [place]." pt was not oriented to place.    Pain/Comfort:  · Pain Rating 1: 0/10    Respiratory Status: Room air    Objective:     Has the patient been evaluated by SLP for swallowing?   Yes  Keep patient NPO? No   Current Respiratory Status:        SLP arrived to room with plans to assess tolerance of diet upgrade since advancing to mechanical soft diet on 12/29.  Pt was demonstrating confusion and was not in an ideal position for PO intake. Pt expressed need to have a bowel movement.  Nurse notified. While waiting for nurse and PCT to arrive to place pt on bedpan, SLP engaged pt in orientation q's. Pt was oriented to month IND and to year given supervision/min A.  Pt was not oriented to place (city or hospital). Pt was not aware that she was in a hospital and believed she was in [place].  SLP explained that she was in [facility] in [place]. Nurse and PCT arrived to place pt on bedpan and session was ended at this time.      Assessment:     Selma Lux is a 86 y.o. female with an SLP diagnosis of Dysphagia and Cognitive-Linguistic Impairment.      Goals:   Multidisciplinary Problems     SLP Goals        " Problem: SLP Goal    Goal Priority Disciplines Outcome   SLP Goal     SLP Ongoing, Progressing   Description: Speech Language Pathology Goals  Goals expected to be met by 1/8:  1. Pt will participate in an ongoing assessment to determine the least restrictive and safest diet with possible updated goals to follow pending results.  2. Pt will participate in a speech, language, and cognitive evaluation with possible updated goals to follow pending results.  2. Pt will attend to therapy tasks for 5+ minutes given 1 redirection across 2 therapy sessions.   3. Pt will complete complex comprehension tasks with 90% accy given min cues.   4. Pt will name 10 items in a concrete category given min cues and no time constraints across 2 categories.   5. Pt will follow complex directions with 75% acc given 1 repetition.   6. Pt will answer temporal orientation questions with 90% acc no cues.                       Plan:     · Patient to be seen:  4 x/week   · Plan of Care expires:  01/14/21  · Plan of Care reviewed with:  patient,caregiver   · SLP Follow-Up:  Yes       Discharge recommendations:  rehabilitation facility     Time Tracking:     SLP Treatment Date:   12/30/21  Speech Start Time:  1007  Speech Stop Time:  1015     Speech Total Time (min):  8 min    Billable Minutes: Speech Therapy Individual 8    12/30/2021

## 2021-12-31 ENCOUNTER — EXTERNAL CHRONIC CARE MANAGEMENT (OUTPATIENT)
Dept: PRIMARY CARE CLINIC | Facility: CLINIC | Age: 86
End: 2021-12-31
Payer: MEDICARE

## 2021-12-31 LAB
ALBUMIN SERPL BCP-MCNC: 2.8 G/DL (ref 3.5–5.2)
ALP SERPL-CCNC: 69 U/L (ref 55–135)
ALT SERPL W/O P-5'-P-CCNC: 29 U/L (ref 10–44)
ANION GAP SERPL CALC-SCNC: 8 MMOL/L (ref 8–16)
ANISOCYTOSIS BLD QL SMEAR: SLIGHT
AST SERPL-CCNC: 24 U/L (ref 10–40)
BASOPHILS # BLD AUTO: 0.03 K/UL (ref 0–0.2)
BASOPHILS NFR BLD: 0.2 % (ref 0–1.9)
BILIRUB SERPL-MCNC: 1.3 MG/DL (ref 0.1–1)
BUN SERPL-MCNC: 16 MG/DL (ref 8–23)
BURR CELLS BLD QL SMEAR: ABNORMAL
CALCIUM SERPL-MCNC: 8.3 MG/DL (ref 8.7–10.5)
CHLORIDE SERPL-SCNC: 111 MMOL/L (ref 95–110)
CO2 SERPL-SCNC: 21 MMOL/L (ref 23–29)
CREAT SERPL-MCNC: 0.7 MG/DL (ref 0.5–1.4)
DIFFERENTIAL METHOD: ABNORMAL
EOSINOPHIL # BLD AUTO: 0.1 K/UL (ref 0–0.5)
EOSINOPHIL NFR BLD: 0.7 % (ref 0–8)
ERYTHROCYTE [DISTWIDTH] IN BLOOD BY AUTOMATED COUNT: 17.5 % (ref 11.5–14.5)
EST. GFR  (AFRICAN AMERICAN): >60 ML/MIN/1.73 M^2
EST. GFR  (NON AFRICAN AMERICAN): >60 ML/MIN/1.73 M^2
GLUCOSE SERPL-MCNC: 102 MG/DL (ref 70–110)
HCT VFR BLD AUTO: 29.6 % (ref 37–48.5)
HGB BLD-MCNC: 9.3 G/DL (ref 12–16)
HYPOCHROMIA BLD QL SMEAR: ABNORMAL
IMM GRANULOCYTES # BLD AUTO: 0.67 K/UL (ref 0–0.04)
IMM GRANULOCYTES NFR BLD AUTO: 3.8 % (ref 0–0.5)
LYMPHOCYTES # BLD AUTO: 1.1 K/UL (ref 1–4.8)
LYMPHOCYTES NFR BLD: 6.2 % (ref 18–48)
MAGNESIUM SERPL-MCNC: 1.6 MG/DL (ref 1.6–2.6)
MCH RBC QN AUTO: 29.1 PG (ref 27–31)
MCHC RBC AUTO-ENTMCNC: 31.4 G/DL (ref 32–36)
MCV RBC AUTO: 93 FL (ref 82–98)
MONOCYTES # BLD AUTO: 2.3 K/UL (ref 0.3–1)
MONOCYTES NFR BLD: 12.7 % (ref 4–15)
NEUTROPHILS # BLD AUTO: 13.6 K/UL (ref 1.8–7.7)
NEUTROPHILS NFR BLD: 76.4 % (ref 38–73)
NRBC BLD-RTO: 0 /100 WBC
OVALOCYTES BLD QL SMEAR: ABNORMAL
PHOSPHATE SERPL-MCNC: 2.2 MG/DL (ref 2.7–4.5)
PLATELET # BLD AUTO: 108 K/UL (ref 150–450)
PLATELET BLD QL SMEAR: ABNORMAL
PMV BLD AUTO: 11.1 FL (ref 9.2–12.9)
POCT GLUCOSE: 127 MG/DL (ref 70–110)
POCT GLUCOSE: 199 MG/DL (ref 70–110)
POCT GLUCOSE: 84 MG/DL (ref 70–110)
POIKILOCYTOSIS BLD QL SMEAR: SLIGHT
POLYCHROMASIA BLD QL SMEAR: ABNORMAL
POTASSIUM SERPL-SCNC: 3.6 MMOL/L (ref 3.5–5.1)
PROT SERPL-MCNC: 4.8 G/DL (ref 6–8.4)
RBC # BLD AUTO: 3.2 M/UL (ref 4–5.4)
SODIUM SERPL-SCNC: 140 MMOL/L (ref 136–145)
WBC # BLD AUTO: 17.82 K/UL (ref 3.9–12.7)

## 2021-12-31 PROCEDURE — 25000242 PHARM REV CODE 250 ALT 637 W/ HCPCS: Performed by: PSYCHIATRY & NEUROLOGY

## 2021-12-31 PROCEDURE — 25000003 PHARM REV CODE 250: Performed by: NURSE PRACTITIONER

## 2021-12-31 PROCEDURE — 80053 COMPREHEN METABOLIC PANEL: CPT | Performed by: NURSE PRACTITIONER

## 2021-12-31 PROCEDURE — 99490 CHRNC CARE MGMT STAFF 1ST 20: CPT | Mod: S$PBB,,, | Performed by: FAMILY MEDICINE

## 2021-12-31 PROCEDURE — 84100 ASSAY OF PHOSPHORUS: CPT | Performed by: NURSE PRACTITIONER

## 2021-12-31 PROCEDURE — 25000003 PHARM REV CODE 250: Performed by: PSYCHIATRY & NEUROLOGY

## 2021-12-31 PROCEDURE — 11000001 HC ACUTE MED/SURG PRIVATE ROOM

## 2021-12-31 PROCEDURE — 99490 PR CHRONIC CARE MGMT, 1ST 20 MIN: ICD-10-PCS | Mod: S$PBB,,, | Performed by: FAMILY MEDICINE

## 2021-12-31 PROCEDURE — 99233 SBSQ HOSP IP/OBS HIGH 50: CPT | Mod: ,,, | Performed by: HOSPITALIST

## 2021-12-31 PROCEDURE — 99233 PR SUBSEQUENT HOSPITAL CARE,LEVL III: ICD-10-PCS | Mod: ,,, | Performed by: HOSPITALIST

## 2021-12-31 PROCEDURE — 99233 SBSQ HOSP IP/OBS HIGH 50: CPT | Mod: 95,,, | Performed by: PSYCHIATRY & NEUROLOGY

## 2021-12-31 PROCEDURE — 63600175 PHARM REV CODE 636 W HCPCS: Performed by: PSYCHIATRY & NEUROLOGY

## 2021-12-31 PROCEDURE — 99233 PR SUBSEQUENT HOSPITAL CARE,LEVL III: ICD-10-PCS | Mod: 95,,, | Performed by: PSYCHIATRY & NEUROLOGY

## 2021-12-31 PROCEDURE — 99900035 HC TECH TIME PER 15 MIN (STAT)

## 2021-12-31 PROCEDURE — 99490 CHRNC CARE MGMT STAFF 1ST 20: CPT | Mod: PBBFAC | Performed by: FAMILY MEDICINE

## 2021-12-31 PROCEDURE — 25000242 PHARM REV CODE 250 ALT 637 W/ HCPCS: Performed by: PHYSICIAN ASSISTANT

## 2021-12-31 PROCEDURE — 63600175 PHARM REV CODE 636 W HCPCS: Performed by: NURSE PRACTITIONER

## 2021-12-31 PROCEDURE — A4216 STERILE WATER/SALINE, 10 ML: HCPCS | Performed by: PSYCHIATRY & NEUROLOGY

## 2021-12-31 PROCEDURE — 94640 AIRWAY INHALATION TREATMENT: CPT

## 2021-12-31 PROCEDURE — 94761 N-INVAS EAR/PLS OXIMETRY MLT: CPT

## 2021-12-31 PROCEDURE — 85025 COMPLETE CBC W/AUTO DIFF WBC: CPT | Performed by: NURSE PRACTITIONER

## 2021-12-31 PROCEDURE — 25000003 PHARM REV CODE 250: Performed by: HOSPITALIST

## 2021-12-31 PROCEDURE — 94668 MNPJ CHEST WALL SBSQ: CPT

## 2021-12-31 PROCEDURE — 83735 ASSAY OF MAGNESIUM: CPT | Performed by: NURSE PRACTITIONER

## 2021-12-31 RX ORDER — SODIUM,POTASSIUM PHOSPHATES 280-250MG
2 POWDER IN PACKET (EA) ORAL
Status: COMPLETED | OUTPATIENT
Start: 2021-12-31 | End: 2022-01-01

## 2021-12-31 RX ORDER — GUAIFENESIN 100 MG/5ML
200 SOLUTION ORAL EVERY 4 HOURS PRN
Status: DISCONTINUED | OUTPATIENT
Start: 2021-12-31 | End: 2022-01-04 | Stop reason: HOSPADM

## 2021-12-31 RX ADMIN — IPRATROPIUM BROMIDE AND ALBUTEROL SULFATE 3 ML: 2.5; .5 SOLUTION RESPIRATORY (INHALATION) at 08:12

## 2021-12-31 RX ADMIN — HYDROXYCHLOROQUINE SULFATE 200 MG: 200 TABLET, FILM COATED ORAL at 10:12

## 2021-12-31 RX ADMIN — LACOSAMIDE 100 MG: 10 SOLUTION ORAL at 10:12

## 2021-12-31 RX ADMIN — ACETYLCYSTEINE 4 ML: 100 INHALANT RESPIRATORY (INHALATION) at 08:12

## 2021-12-31 RX ADMIN — POTASSIUM & SODIUM PHOSPHATES POWDER PACK 280-160-250 MG 2 PACKET: 280-160-250 PACK at 04:12

## 2021-12-31 RX ADMIN — APIXABAN 5 MG: 5 TABLET, FILM COATED ORAL at 10:12

## 2021-12-31 RX ADMIN — AMLODIPINE BESYLATE 10 MG: 10 TABLET ORAL at 08:12

## 2021-12-31 RX ADMIN — MONTELUKAST 10 MG: 10 TABLET, FILM COATED ORAL at 08:12

## 2021-12-31 RX ADMIN — SODIUM CHLORIDE 30 MG/ML INHALATION SOLUTION 4 ML: 30 SOLUTION INHALANT at 08:12

## 2021-12-31 RX ADMIN — ATORVASTATIN CALCIUM 40 MG: 20 TABLET, FILM COATED ORAL at 08:12

## 2021-12-31 RX ADMIN — POTASSIUM & SODIUM PHOSPHATES POWDER PACK 280-160-250 MG 2 PACKET: 280-160-250 PACK at 10:12

## 2021-12-31 RX ADMIN — PREDNISONE 30 MG: 20 TABLET ORAL at 08:12

## 2021-12-31 RX ADMIN — ACETYLCYSTEINE 4 ML: 100 INHALANT RESPIRATORY (INHALATION) at 12:12

## 2021-12-31 RX ADMIN — APIXABAN 5 MG: 5 TABLET, FILM COATED ORAL at 08:12

## 2021-12-31 RX ADMIN — MYCOPHENOLATE MOFETIL 500 MG: 200 POWDER, FOR SUSPENSION ORAL at 10:12

## 2021-12-31 RX ADMIN — FAMOTIDINE 20 MG: 20 TABLET ORAL at 10:12

## 2021-12-31 RX ADMIN — FAMOTIDINE 20 MG: 20 TABLET ORAL at 08:12

## 2021-12-31 RX ADMIN — SENNOSIDES AND DOCUSATE SODIUM 1 TABLET: 50; 8.6 TABLET ORAL at 08:12

## 2021-12-31 RX ADMIN — MELATONIN TAB 3 MG 12 MG: 3 TAB at 10:12

## 2021-12-31 RX ADMIN — IPRATROPIUM BROMIDE AND ALBUTEROL SULFATE 3 ML: 2.5; .5 SOLUTION RESPIRATORY (INHALATION) at 12:12

## 2021-12-31 RX ADMIN — Medication 10 ML: at 12:12

## 2021-12-31 RX ADMIN — SODIUM CHLORIDE 30 MG/ML INHALATION SOLUTION 4 ML: 30 SOLUTION INHALANT at 12:12

## 2021-12-31 RX ADMIN — Medication 10 ML: at 05:12

## 2021-12-31 RX ADMIN — SILODOSIN 4 MG: 4 CAPSULE ORAL at 08:12

## 2021-12-31 RX ADMIN — QUETIAPINE FUMARATE 12.5 MG: 25 TABLET, FILM COATED ORAL at 10:12

## 2021-12-31 RX ADMIN — INSULIN ASPART 2 UNITS: 100 INJECTION, SOLUTION INTRAVENOUS; SUBCUTANEOUS at 04:12

## 2021-12-31 RX ADMIN — Medication 10 ML: at 06:12

## 2021-12-31 NOTE — PROGRESS NOTES
[facility]  Neurosurgery  Progress Note    Subjective:     History of Present Illness: 86-year-old female with past medical history of TIA comes to the emergency department for AMS.  She was found on the floor about 1 hour ago only responding to painful stimuli.  Patient occasionally says her name.GCS 12 noted for EMS.  Patient does take blood thinners. She had a mechanical fall out of bed on Thursday hitting her head and has been complaining of intermittent headaches since. Her last dose of eliquis was on 12/11. NSGY consulted for CTH with SDH      Post-Op Info:  * No surgery found *         Interval History:  NAEON. Exam stable.Resolving delirium today.     Medications:  Continuous Infusions:    Scheduled Meds:   acetylcysteine 100 mg/ml (10%)  4 mL Nebulization QID WAKE    amLODIPine  10 mg Oral Daily    apixaban  5 mg Oral BID    atorvastatin  40 mg Oral Daily    famotidine  20 mg Oral BID    hydrOXYchloroQUINE  200 mg Oral BID    lacosamide  100 mg Oral Q12H    melatonin  12 mg Oral Nightly    montelukast  10 mg Oral Daily    mycophenolate mofetil  500 mg Oral BID    predniSONE  30 mg Oral Daily    QUEtiapine  12.5 mg Oral QHS    senna-docusate 8.6-50 mg  1 tablet Oral Daily    silodosin  4 mg Oral Daily    sodium chloride 0.9%  10 mL Intravenous Q6H    sodium chloride 3%  4 mL Nebulization Q4H     PRN Meds:acetaminophen, albuterol-ipratropium, bisacodyL, calcium gluconate IVPB, calcium gluconate IVPB, calcium gluconate IVPB, dextrose 50%, glucagon (human recombinant), insulin aspart U-100, labetalol, magnesium sulfate IVPB, magnesium sulfate IVPB, ondansetron, potassium chloride in water **AND** potassium chloride in water **AND** potassium chloride in water, racepinephrine, Flushing PICC Protocol **AND** sodium chloride 0.9% **AND** sodium chloride 0.9%       Objective:     Weight: 67 kg (147 lb 11.3 oz)  Body mass index is 25.35 kg/m².  Vital Signs (Most Recent):  Temp:  98.4 °F (36.9 °C) (12/31/21 0702)  Pulse: 96 (12/31/21 0702)  Resp: (!) 21 (12/31/21 0702)  BP: 119/64 (12/31/21 0702)  SpO2: 100 % (12/31/21 0702) Vital Signs (24h Range):  Temp:  [97.4 °F (36.3 °C)-98.9 °F (37.2 °C)] 98.4 °F (36.9 °C)  Pulse:  [] 96  Resp:  [18-51] 21  SpO2:  [100 %] 100 %  BP: (105-155)/(56-91) 119/64     Date 12/31/21 0700 - 01/01/22 0659   Shift 8432-6460 1608-8605 5101-7476 24 Hour Total   INTAKE   P.O. 200   200   Shift Total(mL/kg) 200(3)   200(3)   OUTPUT   Shift Total(mL/kg)       Weight (kg) 67 67 67 67            NG/OG Tube 12/17/21 1405 Left nostril (Active)   Placement Check placement verified by aspirate characteristics 12/22/21 0305   Tolerance no signs/symptoms of discomfort 12/22/21 0305   Securement secured to nostril center 12/22/21 0305   Clamp Status/Tolerance no abdominal distention;no abdominal discomfort;no emesis;no nausea;no residual;no restlessness 12/22/21 0305   Suction Setting/Drainage Method suction at the bedside 12/22/21 0305   Insertion Site Appearance no redness, warmth, tenderness, skin breakdown, drainage 12/22/21 0305   Flush/Irrigation flushed w/;water;no resistance met 12/22/21 0305   Feeding Type continuous;by pump 12/22/21 0305   Feeding Action feeding continued 12/22/21 0305   Current Rate (mL/hr) 10 mL/hr 12/21/21 1905   Goal Rate (mL/hr) 10 mL/hr 12/21/21 1905   Intake (mL) 100 mL 12/20/21 0800   Water Bolus (mL) 200 mL 12/21/21 1600   Rate Formula Tube Feeding (mL/hr) 10 mL/hr 12/21/21 1905   Formula Name Isosource 12/22/21 0305   Intake (mL) - Formula Tube Feeding 20 12/21/21 1800   Residual Amount (ml) 0 ml 12/21/21 1905            Urethral Catheter 12/21/21 0300 Temperature probe 16 Fr. (Active)   Site Assessment Clean;Intact 12/22/21 0305   Collection Container Urimeter 12/22/21 0305   Securement Method secured to top of thigh w/ adhesive device 12/22/21 0305   Catheter Care Performed yes 12/22/21 0305   Reason for Continuing Urinary  "Catheterization Critically ill in ICU and requiring hourly monitoring of intake/output 12/22/21 0305   CAUTI Prevention Bundle StatLock in place w 1" slack;Intact seal between catheter & drainage tubing;Drainage bag/urimeter off the floor;Sheeting clip in use;No dependent loops or kinks;Drainage bag/urimeter not overfilled (<2/3 full);Drainage bag/urimeter below bladder 12/21/21 1905   Output (mL) 20 mL 12/22/21 0700     Physical Exam    AAOx3, PERRL, EOMI, FS, TM, 5/5 throughout, SILT.  Abdomen soft  No resp distress  Extremities intact      Significant Labs:  Recent Labs   Lab 12/29/21  1124   *      K 3.7   *   CO2 22*   BUN 23   CREATININE 0.7   CALCIUM 8.3*   MG 1.7     Recent Labs   Lab 12/29/21  1124   WBC 19.62*   HGB 9.0*   HCT 29.7*   *     No results for input(s): LABPT, INR, APTT in the last 48 hours.  Microbiology Results (last 7 days)     Procedure Component Value Units Date/Time    Blood culture [[id number]] Collected: 12/21/21 1032    Order Status: Completed Specimen: Blood from Peripheral, Antecubital, Right Updated: 12/26/21 1212     Blood Culture, Routine No growth after 5 days.    Narrative:      Blood cultures x 2 different sites. 4 bottles total. Please  draw cultures before administering antibiotics.    Blood culture [[id number]] Collected: 12/21/21 1010    Order Status: Completed Specimen: Blood from Peripheral, Forearm, Right Updated: 12/26/21 1212     Blood Culture, Routine No growth after 5 days.    Narrative:      Blood cultures from 2 different sites. 4 bottles total.  Please draw before starting antibiotics.    Culture, Respiratory with Gram Stain [[id number]]  (Abnormal)  (Susceptibility) Collected: 12/21/21 1015    Order Status: Completed Specimen: Respiratory from Tracheal Aspirate Updated: 12/24/21 1001     Respiratory Culture No S aureus or Pseudomonas isolated.      ESCHERICHIA COLI  Few       Gram Stain (Respiratory) <10 epithelial cells per low power " field.     Gram Stain (Respiratory) Many WBC's     Gram Stain (Respiratory) Few Gram positive cocci        All pertinent labs from the last 24 hours have been reviewed.    Significant Diagnostics:  I have reviewed and interpreted all pertinent imaging results/findings within the past 24 hours.IInterval       Assessment/Plan:     * SDH (subdural hematoma)  An 86F with on eliquis s/p head trauma 1 week prior now with small aSDH and tSAH and AMS c/b PEA arrest 12/20    Continue ICU care   Neurocheck per protocol   Work up:  --CTH: R temp/paretial aSDH 5-6mm thickness, 3-5mm MLS, stable on repeat as well as imaging post PEA arrest  --Multiple Follow up CT stable, last 12/27  AED per Epilepsy  SBP<150  Na >135  Eliquis now resumed and CTH stable once on elequis.     Medical management per NCC  No neurosurigcal intervention at this time.  Plan for TTF to  once medically stable. CTH before discharge. FU to be scheduled by our department  Please page NSGY immediately for any changes in neuro status          Emanuel Cantu MD  Neurosurgery  [facility]

## 2021-12-31 NOTE — NURSING
Nursing Transfer Note       Transfer to 905 from 9093    Transfer via stretcher, bed    Transfer with cardiac monitoring    Transported by RN     Medicines sent: yes    Chart sent with patient: Yes    Belongings sent with patient: wedges, radio, CDs, clothing     Notified: daughter     Bedside Neuro assessment performed: yes     Bedside Handoff given to:RN    Upon arrival to floor: cardiac monitor applied, patient oriented to room, call bell in reach and bed in lowest position

## 2021-12-31 NOTE — PLAN OF CARE
[facility] Care Plan    POC reviewed with Selma Lux and family at 0300. Pt verbalized understanding. Questions and concerns addressed. No acute events overnight. Delirium precautions maintained overnight. Pt progressing toward goals. Will continue to monitor. See below and flowsheets for full assessment and VS info.       Neuro:  Marion Coma Scale  Best Eye Response: 4-->(E4) spontaneous  Best Motor Response: 6-->(M6) obeys commands  Best Verbal Response: 5-->(V5) oriented  Marion Coma Scale Score: 15  Assessment Qualifiers: patient not sedated/intubated  Pupil PERRLA: yes     24hr Temp:  [97.4 °F (36.3 °C)-98.9 °F (37.2 °C)]     CV:   Rhythm: sinus tachycardia  BP goals:   SBP < 160  MAP > 65    Resp:   O2 Device (Oxygen Therapy): room air  Vent Mode: Spont  Set Rate: 8 BPM  Oxygen Concentration (%): 21  Vt Set: 450 mL  PEEP/CPAP: 5 cmH20  Pressure Support: 10 cmH20    Plan: N/A    GI/:  SCOOBY Total Score: 20  Diet/Nutrition Received: mechanical/dental soft  Last Bowel Movement: 12/28/21  Voiding Characteristics: incontinence  No intake or output data in the 24 hours ending 12/31/21 0558  Unmeasured Output  Urine Occurrence: 1  Stool Occurrence: 0  Pad Count: 2    Labs/Accuchecks:  Recent Labs   Lab 12/29/21  1124   WBC 19.62*   RBC 3.13*   HGB 9.0*   HCT 29.7*   *      Recent Labs   Lab 12/29/21  1124      K 3.7   CO2 22*   *   BUN 23   CREATININE 0.7   ALKPHOS 62   ALT 28   AST 21   BILITOT 1.3*    No results for input(s): PROTIME, INR, APTT, HEPANTIXA in the last 168 hours. No results for input(s): CPK, CPKMB, TROPONINI, MB in the last 168 hours.    Electrolytes: N/A - electrolytes WDL  Accuchecks: ACHS    Gtts:       LDA/Wounds:  Lines/Drains/Airways       Peripherally Inserted Central Catheter Line              PICC Double Lumen 12/15/21 1616 right basilic 15 days              Drain              Female External Urinary Catheter 12/31/21 0515 <1 day              Airway                 Airway  Anesthesia 12/21/21 10 days                  Wounds: No  Wound care consulted: No

## 2021-12-31 NOTE — NURSING
Nursing Transfer Note        12/31/2021      Time: 1155     Transfer To: NPU      Transfer via: Patient bed     Transfer with: Patient belongings     Transported by: RN     Medicines sent: yes     Any special needs or follow-up: Voiding ability     Chart send with patient: yes     Notified: ÁNGELA Patel RN     Patient reassessed at 1200    Patient arrived to room. VSS. Patient personal caregiver at bedside. Patient drowsy but easily arouses. Oriented x 4.

## 2021-12-31 NOTE — SUBJECTIVE & OBJECTIVE
Interval History:  NAEON. Exam stable.Resolving delirium today.     Medications:  Continuous Infusions:    Scheduled Meds:   acetylcysteine 100 mg/ml (10%)  4 mL Nebulization QID WAKE    amLODIPine  10 mg Oral Daily    apixaban  5 mg Oral BID    atorvastatin  40 mg Oral Daily    famotidine  20 mg Oral BID    hydrOXYchloroQUINE  200 mg Oral BID    lacosamide  100 mg Oral Q12H    melatonin  12 mg Oral Nightly    montelukast  10 mg Oral Daily    mycophenolate mofetil  500 mg Oral BID    predniSONE  30 mg Oral Daily    QUEtiapine  12.5 mg Oral QHS    senna-docusate 8.6-50 mg  1 tablet Oral Daily    silodosin  4 mg Oral Daily    sodium chloride 0.9%  10 mL Intravenous Q6H    sodium chloride 3%  4 mL Nebulization Q4H     PRN Meds:acetaminophen, albuterol-ipratropium, bisacodyL, calcium gluconate IVPB, calcium gluconate IVPB, calcium gluconate IVPB, dextrose 50%, glucagon (human recombinant), insulin aspart U-100, labetalol, magnesium sulfate IVPB, magnesium sulfate IVPB, ondansetron, potassium chloride in water **AND** potassium chloride in water **AND** potassium chloride in water, racepinephrine, Flushing PICC Protocol **AND** sodium chloride 0.9% **AND** sodium chloride 0.9%       Objective:     Weight: 67 kg (147 lb 11.3 oz)  Body mass index is 25.35 kg/m².  Vital Signs (Most Recent):  Temp: 98.4 °F (36.9 °C) (12/31/21 0702)  Pulse: 96 (12/31/21 0702)  Resp: (!) 21 (12/31/21 0702)  BP: 119/64 (12/31/21 0702)  SpO2: 100 % (12/31/21 0702) Vital Signs (24h Range):  Temp:  [97.4 °F (36.3 °C)-98.9 °F (37.2 °C)] 98.4 °F (36.9 °C)  Pulse:  [] 96  Resp:  [18-51] 21  SpO2:  [100 %] 100 %  BP: (105-155)/(56-91) 119/64     Date 12/31/21 0700 - 01/01/22 0659   Shift 5896-4435 8160-3994 6729-7734 24 Hour Total   INTAKE   P.O. 200   200   Shift Total(mL/kg) 200(3)   200(3)   OUTPUT   Shift Total(mL/kg)       Weight (kg) 67 67 67 67            NG/OG Tube 12/17/21 1405 Left nostril (Active)   Placement Check  "placement verified by aspirate characteristics 12/22/21 0305   Tolerance no signs/symptoms of discomfort 12/22/21 0305   Securement secured to nostril center 12/22/21 0305   Clamp Status/Tolerance no abdominal distention;no abdominal discomfort;no emesis;no nausea;no residual;no restlessness 12/22/21 0305   Suction Setting/Drainage Method suction at the bedside 12/22/21 0305   Insertion Site Appearance no redness, warmth, tenderness, skin breakdown, drainage 12/22/21 0305   Flush/Irrigation flushed w/;water;no resistance met 12/22/21 0305   Feeding Type continuous;by pump 12/22/21 0305   Feeding Action feeding continued 12/22/21 0305   Current Rate (mL/hr) 10 mL/hr 12/21/21 1905   Goal Rate (mL/hr) 10 mL/hr 12/21/21 1905   Intake (mL) 100 mL 12/20/21 0800   Water Bolus (mL) 200 mL 12/21/21 1600   Rate Formula Tube Feeding (mL/hr) 10 mL/hr 12/21/21 1905   Formula Name Isosource 12/22/21 0305   Intake (mL) - Formula Tube Feeding 20 12/21/21 1800   Residual Amount (ml) 0 ml 12/21/21 1905            Urethral Catheter 12/21/21 0300 Temperature probe 16 Fr. (Active)   Site Assessment Clean;Intact 12/22/21 0305   Collection Container Urimeter 12/22/21 0305   Securement Method secured to top of thigh w/ adhesive device 12/22/21 0305   Catheter Care Performed yes 12/22/21 0305   Reason for Continuing Urinary Catheterization Critically ill in ICU and requiring hourly monitoring of intake/output 12/22/21 0305   CAUTI Prevention Bundle StatLock in place w 1" slack;Intact seal between catheter & drainage tubing;Drainage bag/urimeter off the floor;Sheeting clip in use;No dependent loops or kinks;Drainage bag/urimeter not overfilled (<2/3 full);Drainage bag/urimeter below bladder 12/21/21 1905   Output (mL) 20 mL 12/22/21 0700     Physical Exam    AAOx3, PERRL, EOMI, FS, TM, 5/5 throughout, SILT.  Abdomen soft  No resp distress  Extremities intact      Significant Labs:  Recent Labs   Lab 12/29/21  1124   *      K 3.7 "   *   CO2 22*   BUN 23   CREATININE 0.7   CALCIUM 8.3*   MG 1.7     Recent Labs   Lab 12/29/21  1124   WBC 19.62*   HGB 9.0*   HCT 29.7*   *     No results for input(s): LABPT, INR, APTT in the last 48 hours.  Microbiology Results (last 7 days)     Procedure Component Value Units Date/Time    Blood culture [[id number]] Collected: 12/21/21 1032    Order Status: Completed Specimen: Blood from Peripheral, Antecubital, Right Updated: 12/26/21 1212     Blood Culture, Routine No growth after 5 days.    Narrative:      Blood cultures x 2 different sites. 4 bottles total. Please  draw cultures before administering antibiotics.    Blood culture [[id number]] Collected: 12/21/21 1010    Order Status: Completed Specimen: Blood from Peripheral, Forearm, Right Updated: 12/26/21 1212     Blood Culture, Routine No growth after 5 days.    Narrative:      Blood cultures from 2 different sites. 4 bottles total.  Please draw before starting antibiotics.    Culture, Respiratory with Gram Stain [[id number]]  (Abnormal)  (Susceptibility) Collected: 12/21/21 1015    Order Status: Completed Specimen: Respiratory from Tracheal Aspirate Updated: 12/24/21 1001     Respiratory Culture No S aureus or Pseudomonas isolated.      ESCHERICHIA COLI  Few       Gram Stain (Respiratory) <10 epithelial cells per low power field.     Gram Stain (Respiratory) Many WBC's     Gram Stain (Respiratory) Few Gram positive cocci        All pertinent labs from the last 24 hours have been reviewed.    Significant Diagnostics:  I have reviewed and interpreted all pertinent imaging results/findings within the past 24 hours.IInterval

## 2021-12-31 NOTE — PROGRESS NOTES
[facility] - Neurosurgery ([facility])  Neurocritical Care  Progress Note    Admit Date: 12/13/2021  Service Date: 12/31/2021  Length of Stay: 18    Subjective:     Chief Complaint: SDH (subdural hematoma)    History of Present Illness:   The patient is an 86-year-old female with past medical history of TIA, RA and Afib of Eliquis admitted to [facility] with Right SDH.  She was found on the floor this morning only responding to painful stimuli.  Patient occasionally says her name.GCS 12 noted for EMS.  PCC administered. She had a mechanical fall out of bed on Thursday hitting her head and has been complaining of intermittent headaches since. Her last dose of eliquis was on 12/11. Pending repeat CT head. NSGY consulted. In addition patient was found to be hyponatremic at 118-central line placed and hypertonic saline infusion initiated. Pending repeat CT head. Patient admitted to [facility] for close monitoring and higher level of care.       Hospital Course: 12/13/2021: patient admitted to [facility] s/p fall on 12/11 on Eliquis  12/14/2021 wheezing, prolonged expiratory phase.   12/15/2021 copious respiratory secretions. Remains encephalopathic. Review EEG  12/16/2021 improved respiratory secretions. D/c nasal tracheal airway if minimal secretions.   12/17/2021 restless.   12/18/2021 lethargic, rising BUN/Cr ratio. Fluid resuscitation. Received flumazenil for BDZ   12/19/2021 encephalopathy overnight. Work up neg so far. BUN/Cr >20 from volume contraction.   12/20/2021 left facial twitching overnight. Loaded with vimpat.   12/21/2021 event overnight cardiac arrest/PEA likely related to respiratory distress. Intubated.   12/22/2021 Add precedex to enable vent weaning.  12/23/2021   extubation trial today  12/24/2021 extubated over cook catheter due to concern for laryngeal edema. Successfully extubated this am. Close monitoring.   12/25/2021 agitated delirium overnight.   12/26/2021 increase cough assist frequency.   12/27: pulmonary  toilette, prednisone, daughter updated at bedside, change diet to puree  12/28: resume home apixaban, rescan head tonight, continue pulmonary toilette, can probably step down in AM, melatonin at 22 hr  12/29/2021 CT head stable./ Pureed diet started. Stepping down to medicine.   12/30/2021 trouble with sleeping overnight, will utilize Seroquel tonight. Avoid Nebs at night.    12/31/2021 Stepped down to HM.       Interval History:   >4 elements OR status of 3 inpatient conditions    Review of Systems   Constitutional: Negative for fever.   HENT: Positive for trouble swallowing. Negative for voice change.    Eyes: Negative for visual disturbance.   Respiratory: Negative for shortness of breath and wheezing.    Gastrointestinal: Negative for nausea.   Musculoskeletal: Negative for neck pain and neck stiffness.   Skin: Negative for rash.   Allergic/Immunologic: Positive for immunocompromised state.   Neurological: Negative for dizziness and headaches.   Psychiatric/Behavioral: Negative for agitation and confusion.     2 systems OR Unable to obtain a complete ROS due to level of consciousness.  Objective:     Vitals:  Temp: 97.2 °F (36.2 °C)  Pulse: 93  Rhythm: sinus tachycardia  BP: 119/61  MAP (mmHg): 84  Resp: 16  SpO2: 100 %  O2 Device (Oxygen Therapy): room air    Temp  Min: 97.2 °F (36.2 °C)  Max: 98.9 °F (37.2 °C)  Pulse  Min: 88  Max: 121  BP  Min: 105/58  Max: 155/68  MAP (mmHg)  Min: 76  Max: 108  Resp  Min: 16  Max: 51  SpO2  Min: 99 %  Max: 100 %    12/30 0701 - 12/31 0700  In: 200 [P.O.:200]  Out: -    Unmeasured Output  Urine Occurrence: 1  Stool Occurrence: 0  Pad Count: 2       Physical Exam  Constitutional:       Appearance: Normal appearance.   HENT:      Head: Normocephalic.      Mouth/Throat:      Mouth: Mucous membranes are dry.   Eyes:      Extraocular Movements: Extraocular movements intact.      Pupils: Pupils are equal, round, and reactive to light.   Cardiovascular:      Rate and Rhythm: Normal  "rate.   Musculoskeletal:         General: Normal range of motion.      Cervical back: Normal range of motion.   Skin:     General: Skin is warm.   Neurological:      Mental Status: She is alert.      Comments: --sedation: none  --Mental Status: awake, follows simple commands, when asked the year she states "22...12..21" and perseverates on this, does not dose off this morning during assessment, improved from yesterday  --CN II-XII grossly intact.   --Pupils brisk bilat  --Motor: 4/5 throughout  --sensory: intact to soft touch and pain throughout  --Reflexes: not tested  --Gait: deferred           Medications:  Continuous Scheduledacetylcysteine 100 mg/ml (10%), 4 mL, QID WAKE  amLODIPine, 10 mg, Daily  apixaban, 5 mg, BID  atorvastatin, 40 mg, Daily  famotidine, 20 mg, BID  hydrOXYchloroQUINE, 200 mg, BID  lacosamide, 100 mg, Q12H  melatonin, 12 mg, Nightly  montelukast, 10 mg, Daily  mycophenolate mofetil, 500 mg, BID  predniSONE, 30 mg, Daily  QUEtiapine, 12.5 mg, QHS  senna-docusate 8.6-50 mg, 1 tablet, Daily  silodosin, 4 mg, Daily  sodium chloride 0.9%, 10 mL, Q6H  sodium chloride 3%, 4 mL, Q4H    PRNacetaminophen, 650 mg, Q6H PRN  albuterol-ipratropium, 3 mL, Q4H PRN  bisacodyL, 10 mg, Daily PRN  calcium gluconate IVPB, 1 g, PRN  calcium gluconate IVPB, 2 g, PRN  calcium gluconate IVPB, 3 g, PRN  dextrose 50%, 12.5 g, PRN  glucagon (human recombinant), 1 mg, PRN  insulin aspart U-100, 1-10 Units, Q4H PRN  labetalol, 5 mg, Q6H PRN  magnesium sulfate IVPB, 2 g, PRN  magnesium sulfate IVPB, 4 g, PRN  ondansetron, 4 mg, Q6H PRN  potassium chloride in water, 40 mEq, PRN   And  potassium chloride in water, 60 mEq, PRN   And  potassium chloride in water, 80 mEq, PRN  racepinephrine, 0.5 mL, Q4H PRN  sodium chloride 0.9%, 10 mL, PRN      Today I personally reviewed pertinent medications, lines/drains/airways, imaging, laboratory results, notably:    Diet  Diet Dysphagia Mechanical Soft (IDDSI Level 5) [facility]; Thin  Diet Dysphagia Mechanical Soft (IDDSI Level 5) [facility]; Thin        Assessment/Plan:     Neuro  * SDH (subdural hematoma)  - SDH with brain compression  - non surgical  - was on apixaban and reversed with PCC on admission    Plan:  - Vimpat 100 mg BID  - f/u with neurosurgery clinic with repeat CT head  - Stable to step down to     Renal/  Hyponatremia  -diet  -prn fluids            The patient is being Prophylaxed for:  Venous Thromboembolism with: Mechanical or Chemical  Stress Ulcer with: H2B  Ventilator Pneumonia with: not applicable    Activity Orders          Diet Dysphagia Mechanical Soft (IDDSI Level 5) [facility]; Thin: Dysphagia 2 (Mechanical Soft Ground) starting at 12/29 1057    Turn patient starting at 12/19 1423        Full Code    Grace Block MD  Neurocritical Care  [facility] - Neurosurgery ([facility])

## 2021-12-31 NOTE — ASSESSMENT & PLAN NOTE
- SDH with brain compression  - non surgical  - was on apixaban and reversed with PCC on admission    Plan:  - Vimpat 100 mg BID  - f/u with neurosurgery clinic with repeat CT head  - Stable to step down to HM

## 2021-12-31 NOTE — SUBJECTIVE & OBJECTIVE
"Interval History:   >4 elements OR status of 3 inpatient conditions    Review of Systems   Constitutional: Negative for fever.   HENT: Positive for trouble swallowing. Negative for voice change.    Eyes: Negative for visual disturbance.   Respiratory: Negative for shortness of breath and wheezing.    Gastrointestinal: Negative for nausea.   Musculoskeletal: Negative for neck pain and neck stiffness.   Skin: Negative for rash.   Allergic/Immunologic: Positive for immunocompromised state.   Neurological: Negative for dizziness and headaches.   Psychiatric/Behavioral: Negative for agitation and confusion.     2 systems OR Unable to obtain a complete ROS due to level of consciousness.  Objective:     Vitals:  Temp: 97.2 °F (36.2 °C)  Pulse: 93  Rhythm: sinus tachycardia  BP: 119/61  MAP (mmHg): 84  Resp: 16  SpO2: 100 %  O2 Device (Oxygen Therapy): room air    Temp  Min: 97.2 °F (36.2 °C)  Max: 98.9 °F (37.2 °C)  Pulse  Min: 88  Max: 121  BP  Min: 105/58  Max: 155/68  MAP (mmHg)  Min: 76  Max: 108  Resp  Min: 16  Max: 51  SpO2  Min: 99 %  Max: 100 %    12/30 0701 - 12/31 0700  In: 200 [P.O.:200]  Out: -    Unmeasured Output  Urine Occurrence: 1  Stool Occurrence: 0  Pad Count: 2       Physical Exam  Constitutional:       Appearance: Normal appearance.   HENT:      Head: Normocephalic.      Mouth/Throat:      Mouth: Mucous membranes are dry.   Eyes:      Extraocular Movements: Extraocular movements intact.      Pupils: Pupils are equal, round, and reactive to light.   Cardiovascular:      Rate and Rhythm: Normal rate.   Musculoskeletal:         General: Normal range of motion.      Cervical back: Normal range of motion.   Skin:     General: Skin is warm.   Neurological:      Mental Status: She is alert.      Comments: --sedation: none  --Mental Status: awake, follows simple commands, when asked the year she states "22...12..21" and perseverates on this, does not dose off this morning during assessment, improved from " yesterday  --CN II-XII grossly intact.   --Pupils brisk bilat  --Motor: 4/5 throughout  --sensory: intact to soft touch and pain throughout  --Reflexes: not tested  --Gait: deferred           Medications:  Continuous Scheduledacetylcysteine 100 mg/ml (10%), 4 mL, QID WAKE  amLODIPine, 10 mg, Daily  apixaban, 5 mg, BID  atorvastatin, 40 mg, Daily  famotidine, 20 mg, BID  hydrOXYchloroQUINE, 200 mg, BID  lacosamide, 100 mg, Q12H  melatonin, 12 mg, Nightly  montelukast, 10 mg, Daily  mycophenolate mofetil, 500 mg, BID  predniSONE, 30 mg, Daily  QUEtiapine, 12.5 mg, QHS  senna-docusate 8.6-50 mg, 1 tablet, Daily  silodosin, 4 mg, Daily  sodium chloride 0.9%, 10 mL, Q6H  sodium chloride 3%, 4 mL, Q4H    PRNacetaminophen, 650 mg, Q6H PRN  albuterol-ipratropium, 3 mL, Q4H PRN  bisacodyL, 10 mg, Daily PRN  calcium gluconate IVPB, 1 g, PRN  calcium gluconate IVPB, 2 g, PRN  calcium gluconate IVPB, 3 g, PRN  dextrose 50%, 12.5 g, PRN  glucagon (human recombinant), 1 mg, PRN  insulin aspart U-100, 1-10 Units, Q4H PRN  labetalol, 5 mg, Q6H PRN  magnesium sulfate IVPB, 2 g, PRN  magnesium sulfate IVPB, 4 g, PRN  ondansetron, 4 mg, Q6H PRN  potassium chloride in water, 40 mEq, PRN   And  potassium chloride in water, 60 mEq, PRN   And  potassium chloride in water, 80 mEq, PRN  racepinephrine, 0.5 mL, Q4H PRN  sodium chloride 0.9%, 10 mL, PRN      Today I personally reviewed pertinent medications, lines/drains/airways, imaging, laboratory results, notably:    Diet  Diet Dysphagia Mechanical Soft (IDDSI Level 5) [facility]; Thin  Diet Dysphagia Mechanical Soft (IDDSI Level 5) [facility]; Thin

## 2022-01-01 LAB
ALBUMIN SERPL BCP-MCNC: 2.7 G/DL (ref 3.5–5.2)
ANION GAP SERPL CALC-SCNC: 7 MMOL/L (ref 8–16)
BASOPHILS # BLD AUTO: 0.03 K/UL (ref 0–0.2)
BASOPHILS NFR BLD: 0.2 % (ref 0–1.9)
BUN SERPL-MCNC: 14 MG/DL (ref 8–23)
BURR CELLS BLD QL SMEAR: ABNORMAL
CALCIUM SERPL-MCNC: 8 MG/DL (ref 8.7–10.5)
CHLORIDE SERPL-SCNC: 110 MMOL/L (ref 95–110)
CO2 SERPL-SCNC: 22 MMOL/L (ref 23–29)
CREAT SERPL-MCNC: 0.6 MG/DL (ref 0.5–1.4)
DIFFERENTIAL METHOD: ABNORMAL
EOSINOPHIL # BLD AUTO: 0.2 K/UL (ref 0–0.5)
EOSINOPHIL NFR BLD: 0.9 % (ref 0–8)
ERYTHROCYTE [DISTWIDTH] IN BLOOD BY AUTOMATED COUNT: 17.9 % (ref 11.5–14.5)
EST. GFR  (AFRICAN AMERICAN): >60 ML/MIN/1.73 M^2
EST. GFR  (NON AFRICAN AMERICAN): >60 ML/MIN/1.73 M^2
GLUCOSE SERPL-MCNC: 79 MG/DL (ref 70–110)
HCT VFR BLD AUTO: 31.7 % (ref 37–48.5)
HGB BLD-MCNC: 9.5 G/DL (ref 12–16)
HYPOCHROMIA BLD QL SMEAR: ABNORMAL
IMM GRANULOCYTES # BLD AUTO: 0.79 K/UL (ref 0–0.04)
IMM GRANULOCYTES NFR BLD AUTO: 4.4 % (ref 0–0.5)
LYMPHOCYTES # BLD AUTO: 2.6 K/UL (ref 1–4.8)
LYMPHOCYTES NFR BLD: 14.4 % (ref 18–48)
MAGNESIUM SERPL-MCNC: 1.6 MG/DL (ref 1.6–2.6)
MCH RBC QN AUTO: 28.4 PG (ref 27–31)
MCHC RBC AUTO-ENTMCNC: 30 G/DL (ref 32–36)
MCV RBC AUTO: 95 FL (ref 82–98)
MONOCYTES # BLD AUTO: 3 K/UL (ref 0.3–1)
MONOCYTES NFR BLD: 16.8 % (ref 4–15)
NEUTROPHILS # BLD AUTO: 11.4 K/UL (ref 1.8–7.7)
NEUTROPHILS NFR BLD: 63.3 % (ref 38–73)
NRBC BLD-RTO: 0 /100 WBC
OVALOCYTES BLD QL SMEAR: ABNORMAL
PHOSPHATE SERPL-MCNC: 2.2 MG/DL (ref 2.7–4.5)
PHOSPHATE SERPL-MCNC: 2.2 MG/DL (ref 2.7–4.5)
PLATELET # BLD AUTO: 144 K/UL (ref 150–450)
PMV BLD AUTO: 10.5 FL (ref 9.2–12.9)
POCT GLUCOSE: 158 MG/DL (ref 70–110)
POCT GLUCOSE: 182 MG/DL (ref 70–110)
POCT GLUCOSE: 84 MG/DL (ref 70–110)
POCT GLUCOSE: 86 MG/DL (ref 70–110)
POIKILOCYTOSIS BLD QL SMEAR: SLIGHT
POLYCHROMASIA BLD QL SMEAR: ABNORMAL
POTASSIUM SERPL-SCNC: 3.6 MMOL/L (ref 3.5–5.1)
RBC # BLD AUTO: 3.34 M/UL (ref 4–5.4)
SCHISTOCYTES BLD QL SMEAR: ABNORMAL
SODIUM SERPL-SCNC: 139 MMOL/L (ref 136–145)
WBC # BLD AUTO: 17.97 K/UL (ref 3.9–12.7)

## 2022-01-01 PROCEDURE — 99221 1ST HOSP IP/OBS SF/LOW 40: CPT | Mod: ,,, | Performed by: INTERNAL MEDICINE

## 2022-01-01 PROCEDURE — 25000003 PHARM REV CODE 250: Performed by: PSYCHIATRY & NEUROLOGY

## 2022-01-01 PROCEDURE — 63600175 PHARM REV CODE 636 W HCPCS: Performed by: HOSPITALIST

## 2022-01-01 PROCEDURE — 94640 AIRWAY INHALATION TREATMENT: CPT

## 2022-01-01 PROCEDURE — 80069 RENAL FUNCTION PANEL: CPT | Performed by: HOSPITALIST

## 2022-01-01 PROCEDURE — 94761 N-INVAS EAR/PLS OXIMETRY MLT: CPT

## 2022-01-01 PROCEDURE — 94668 MNPJ CHEST WALL SBSQ: CPT

## 2022-01-01 PROCEDURE — 25000003 PHARM REV CODE 250: Performed by: NURSE PRACTITIONER

## 2022-01-01 PROCEDURE — 11000001 HC ACUTE MED/SURG PRIVATE ROOM

## 2022-01-01 PROCEDURE — 25000003 PHARM REV CODE 250: Performed by: HOSPITALIST

## 2022-01-01 PROCEDURE — 99900035 HC TECH TIME PER 15 MIN (STAT)

## 2022-01-01 PROCEDURE — 85025 COMPLETE CBC W/AUTO DIFF WBC: CPT | Performed by: HOSPITALIST

## 2022-01-01 PROCEDURE — 25000242 PHARM REV CODE 250 ALT 637 W/ HCPCS: Performed by: PSYCHIATRY & NEUROLOGY

## 2022-01-01 PROCEDURE — A4216 STERILE WATER/SALINE, 10 ML: HCPCS | Performed by: PSYCHIATRY & NEUROLOGY

## 2022-01-01 PROCEDURE — 99233 PR SUBSEQUENT HOSPITAL CARE,LEVL III: ICD-10-PCS | Mod: ,,, | Performed by: HOSPITALIST

## 2022-01-01 PROCEDURE — 99233 SBSQ HOSP IP/OBS HIGH 50: CPT | Mod: ,,, | Performed by: HOSPITALIST

## 2022-01-01 PROCEDURE — 63600175 PHARM REV CODE 636 W HCPCS: Performed by: NURSE PRACTITIONER

## 2022-01-01 PROCEDURE — 36415 COLL VENOUS BLD VENIPUNCTURE: CPT | Performed by: HOSPITALIST

## 2022-01-01 PROCEDURE — 99221 PR INITIAL HOSPITAL CARE,LEVL I: ICD-10-PCS | Mod: ,,, | Performed by: INTERNAL MEDICINE

## 2022-01-01 PROCEDURE — 83735 ASSAY OF MAGNESIUM: CPT | Performed by: HOSPITALIST

## 2022-01-01 PROCEDURE — 25000242 PHARM REV CODE 250 ALT 637 W/ HCPCS: Performed by: PHYSICIAN ASSISTANT

## 2022-01-01 RX ORDER — CALCIUM CARBONATE 200(500)MG
1000 TABLET,CHEWABLE ORAL DAILY
Status: DISCONTINUED | OUTPATIENT
Start: 2022-01-01 | End: 2022-01-04 | Stop reason: HOSPADM

## 2022-01-01 RX ORDER — PREDNISONE 5 MG/1
10 TABLET ORAL DAILY
Status: DISCONTINUED | OUTPATIENT
Start: 2022-01-01 | End: 2022-01-04 | Stop reason: HOSPADM

## 2022-01-01 RX ORDER — CHOLECALCIFEROL (VITAMIN D3) 25 MCG
2000 TABLET ORAL DAILY
Status: DISCONTINUED | OUTPATIENT
Start: 2022-01-01 | End: 2022-01-04 | Stop reason: HOSPADM

## 2022-01-01 RX ADMIN — MELATONIN TAB 3 MG 12 MG: 3 TAB at 09:01

## 2022-01-01 RX ADMIN — ACETYLCYSTEINE 4 ML: 100 INHALANT RESPIRATORY (INHALATION) at 07:01

## 2022-01-01 RX ADMIN — ACETYLCYSTEINE 4 ML: 100 INHALANT RESPIRATORY (INHALATION) at 08:01

## 2022-01-01 RX ADMIN — IPRATROPIUM BROMIDE AND ALBUTEROL SULFATE 3 ML: 2.5; .5 SOLUTION RESPIRATORY (INHALATION) at 08:01

## 2022-01-01 RX ADMIN — ACETAMINOPHEN 650 MG: 325 TABLET ORAL at 09:01

## 2022-01-01 RX ADMIN — APIXABAN 5 MG: 5 TABLET, FILM COATED ORAL at 09:01

## 2022-01-01 RX ADMIN — MONTELUKAST 10 MG: 10 TABLET, FILM COATED ORAL at 09:01

## 2022-01-01 RX ADMIN — MYCOPHENOLATE MOFETIL 500 MG: 200 POWDER, FOR SUSPENSION ORAL at 11:01

## 2022-01-01 RX ADMIN — IPRATROPIUM BROMIDE AND ALBUTEROL SULFATE 3 ML: 2.5; .5 SOLUTION RESPIRATORY (INHALATION) at 04:01

## 2022-01-01 RX ADMIN — SODIUM CHLORIDE 30 MG/ML INHALATION SOLUTION 4 ML: 30 SOLUTION INHALANT at 08:01

## 2022-01-01 RX ADMIN — PREDNISONE 10 MG: 5 TABLET ORAL at 09:01

## 2022-01-01 RX ADMIN — Medication 10 ML: at 06:01

## 2022-01-01 RX ADMIN — SODIUM CHLORIDE 30 MG/ML INHALATION SOLUTION 4 ML: 30 SOLUTION INHALANT at 04:01

## 2022-01-01 RX ADMIN — QUETIAPINE FUMARATE 12.5 MG: 25 TABLET, FILM COATED ORAL at 09:01

## 2022-01-01 RX ADMIN — CHOLECALCIFEROL TAB 25 MCG (1000 UNIT) 2000 UNITS: 25 TAB at 02:01

## 2022-01-01 RX ADMIN — HYDROXYCHLOROQUINE SULFATE 200 MG: 200 TABLET, FILM COATED ORAL at 09:01

## 2022-01-01 RX ADMIN — CALCIUM CARBONATE (ANTACID) CHEW TAB 500 MG 1000 MG: 500 CHEW TAB at 02:01

## 2022-01-01 RX ADMIN — AMLODIPINE BESYLATE 10 MG: 10 TABLET ORAL at 09:01

## 2022-01-01 RX ADMIN — Medication 10 ML: at 05:01

## 2022-01-01 RX ADMIN — FAMOTIDINE 20 MG: 20 TABLET ORAL at 09:01

## 2022-01-01 RX ADMIN — ATORVASTATIN CALCIUM 40 MG: 20 TABLET, FILM COATED ORAL at 09:01

## 2022-01-01 RX ADMIN — LACOSAMIDE 100 MG: 10 SOLUTION ORAL at 09:01

## 2022-01-01 RX ADMIN — Medication 10 ML: at 12:01

## 2022-01-01 RX ADMIN — SILODOSIN 4 MG: 4 CAPSULE ORAL at 09:01

## 2022-01-01 RX ADMIN — SENNOSIDES AND DOCUSATE SODIUM 1 TABLET: 50; 8.6 TABLET ORAL at 09:01

## 2022-01-01 RX ADMIN — ACETYLCYSTEINE 4 ML: 100 INHALANT RESPIRATORY (INHALATION) at 04:01

## 2022-01-01 RX ADMIN — ALTEPLASE 2 MG: 2.2 INJECTION, POWDER, LYOPHILIZED, FOR SOLUTION INTRAVENOUS at 01:01

## 2022-01-01 RX ADMIN — SODIUM CHLORIDE 30 MG/ML INHALATION SOLUTION 4 ML: 30 SOLUTION INHALANT at 07:01

## 2022-01-01 RX ADMIN — INSULIN ASPART 2 UNITS: 100 INJECTION, SOLUTION INTRAVENOUS; SUBCUTANEOUS at 05:01

## 2022-01-01 RX ADMIN — ACETAMINOPHEN 650 MG: 325 TABLET ORAL at 04:01

## 2022-01-01 RX ADMIN — POTASSIUM & SODIUM PHOSPHATES POWDER PACK 280-160-250 MG 2 PACKET: 280-160-250 PACK at 06:01

## 2022-01-01 RX ADMIN — IPRATROPIUM BROMIDE AND ALBUTEROL SULFATE 3 ML: 2.5; .5 SOLUTION RESPIRATORY (INHALATION) at 07:01

## 2022-01-01 RX ADMIN — MYCOPHENOLATE MOFETIL 500 MG: 200 POWDER, FOR SUSPENSION ORAL at 09:01

## 2022-01-01 NOTE — SUBJECTIVE & OBJECTIVE
Interval History:  NAEON. AFVSS. Exam stable. Pain controlled.     Medications:  Continuous Infusions:    Scheduled Meds:   acetylcysteine 100 mg/ml (10%)  4 mL Nebulization QID WAKE    amLODIPine  10 mg Oral Daily    apixaban  5 mg Oral BID    atorvastatin  40 mg Oral Daily    famotidine  20 mg Oral BID    hydrOXYchloroQUINE  200 mg Oral BID    lacosamide  100 mg Oral Q12H    melatonin  12 mg Oral Nightly    montelukast  10 mg Oral Daily    mycophenolate mofetil  500 mg Oral BID    predniSONE  10 mg Oral Daily    QUEtiapine  12.5 mg Oral QHS    senna-docusate 8.6-50 mg  1 tablet Oral Daily    silodosin  4 mg Oral Daily    sodium chloride 0.9%  10 mL Intravenous Q6H    sodium chloride 3%  4 mL Nebulization Q4H     PRN Meds:acetaminophen, albuterol-ipratropium, bisacodyL, dextrose 50%, glucagon (human recombinant), guaiFENesin 100 mg/5 ml, insulin aspart U-100, labetalol, ondansetron, racepinephrine, Flushing PICC Protocol **AND** sodium chloride 0.9% **AND** sodium chloride 0.9%       Objective:     Weight: 67 kg (147 lb 11.3 oz)  Body mass index is 25.35 kg/m².  Vital Signs (Most Recent):  Temp: 98.3 °F (36.8 °C) (01/01/22 0748)  Pulse: 96 (01/01/22 0804)  Resp: 18 (01/01/22 0804)  BP: (!) 117/56 (01/01/22 0748)  SpO2: 99 % (01/01/22 0804) Vital Signs (24h Range):  Temp:  [97.2 °F (36.2 °C)-98.9 °F (37.2 °C)] 98.3 °F (36.8 °C)  Pulse:  [] 96  Resp:  [16-24] 18  SpO2:  [94 %-100 %] 99 %  BP: (107-131)/(56-65) 117/56              NG/OG Tube 12/17/21 1405 Left nostril (Active)   Placement Check placement verified by aspirate characteristics 12/22/21 0305   Tolerance no signs/symptoms of discomfort 12/22/21 0305   Securement secured to nostril center 12/22/21 0305   Clamp Status/Tolerance no abdominal distention;no abdominal discomfort;no emesis;no nausea;no residual;no restlessness 12/22/21 0305   Suction Setting/Drainage Method suction at the bedside 12/22/21 0305   Insertion Site Appearance no  "redness, warmth, tenderness, skin breakdown, drainage 12/22/21 0305   Flush/Irrigation flushed w/;water;no resistance met 12/22/21 0305   Feeding Type continuous;by pump 12/22/21 0305   Feeding Action feeding continued 12/22/21 0305   Current Rate (mL/hr) 10 mL/hr 12/21/21 1905   Goal Rate (mL/hr) 10 mL/hr 12/21/21 1905   Intake (mL) 100 mL 12/20/21 0800   Water Bolus (mL) 200 mL 12/21/21 1600   Rate Formula Tube Feeding (mL/hr) 10 mL/hr 12/21/21 1905   Formula Name Isosource 12/22/21 0305   Intake (mL) - Formula Tube Feeding 20 12/21/21 1800   Residual Amount (ml) 0 ml 12/21/21 1905            Urethral Catheter 12/21/21 0300 Temperature probe 16 Fr. (Active)   Site Assessment Clean;Intact 12/22/21 0305   Collection Container Urimeter 12/22/21 0305   Securement Method secured to top of thigh w/ adhesive device 12/22/21 0305   Catheter Care Performed yes 12/22/21 0305   Reason for Continuing Urinary Catheterization Critically ill in ICU and requiring hourly monitoring of intake/output 12/22/21 0305   CAUTI Prevention Bundle StatLock in place w 1" slack;Intact seal between catheter & drainage tubing;Drainage bag/urimeter off the floor;Sheeting clip in use;No dependent loops or kinks;Drainage bag/urimeter not overfilled (<2/3 full);Drainage bag/urimeter below bladder 12/21/21 1905   Output (mL) 20 mL 12/22/21 0700     Physical Exam    AAOx3, PERRL, EOMI, FS, TM, 5/5 throughout, SILT.  Abdomen soft  No resp distress  Extremities intact      Significant Labs:  Recent Labs   Lab 12/31/21  0956 01/01/22  0837    79    139   K 3.6 3.6   * 110   CO2 21* 22*   BUN 16 14   CREATININE 0.7 0.6   CALCIUM 8.3* 8.0*   MG 1.6 1.6     Recent Labs   Lab 12/31/21  0956 01/01/22  0837   WBC 17.82* 17.97*   HGB 9.3* 9.5*   HCT 29.6* 31.7*   * 144*     No results for input(s): LABPT, INR, APTT in the last 48 hours.  Microbiology Results (last 7 days)     Procedure Component Value Units Date/Time    Blood culture " [[id number]] Collected: 12/21/21 1032    Order Status: Completed Specimen: Blood from Peripheral, Antecubital, Right Updated: 12/26/21 1212     Blood Culture, Routine No growth after 5 days.    Narrative:      Blood cultures x 2 different sites. 4 bottles total. Please  draw cultures before administering antibiotics.    Blood culture [[id number]] Collected: 12/21/21 1010    Order Status: Completed Specimen: Blood from Peripheral, Forearm, Right Updated: 12/26/21 1212     Blood Culture, Routine No growth after 5 days.    Narrative:      Blood cultures from 2 different sites. 4 bottles total.  Please draw before starting antibiotics.        All pertinent labs from the last 24 hours have been reviewed.    Significant Diagnostics:  I have reviewed and interpreted all pertinent imaging results/findings within the past 24 hours.

## 2022-01-01 NOTE — ASSESSMENT & PLAN NOTE
Selma Lux is a 86 y.o. 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 Rheumatology was consulted for recommendations on current medication regimen.    Following with Dr. Rouse since 2018 for seropositive RA (followed with Dr. Qureshi previously). In April 2018, she was hospitalized for respiratory failure and found to have suspected cryptogenic organizing pneumonia (no biopsy performed). She was then started on cellcept 500mg BID after discussion between her rheumatologist and pulmonologist in 2019. She has been on cellcept 500mg BID since that date. She also is on plaquenil 200mg BID and prednisone 10mg.     Imaging:  CXR: Widening of the mediastinum with findings highly suggestive of a right paratracheal mass with deviation of the trachea to the left as above.  Further evaluation of the mediastinum with a dedicated CT scan of the chest with contrast is suggested.    Recommendations:  - okay to continue current regimen with cellcept 500mg BID, plaquenil 200mg BID and prednisone 10mg  - can consider additional imaging given abnormality in CXR   - will need follow up with Dr. Rouse on discharge

## 2022-01-01 NOTE — PLAN OF CARE
01/01/22 0958   Post-Acute Status   Post-Acute Authorization Placement   Post-Acute Placement Status Referrals Sent       Referral in process.    D/c plan for Pt to d/c to [facility] pending PMR and medical clearance.       SW/CM will con't to follow.     Juana Vasques LMSW  Case Management Social Worker   [facility], [address]

## 2022-01-01 NOTE — PROGRESS NOTES
[facility] - Neurosurgery ([facility])  Neurosurgery  Progress Note    Subjective:     History of Present Illness: 86-year-old female with past medical history of TIA comes to the emergency department for AMS.  She was found on the floor about 1 hour ago only responding to painful stimuli.  Patient occasionally says her name.GCS 12 noted for EMS.  Patient does take blood thinners. She had a mechanical fall out of bed on Thursday hitting her head and has been complaining of intermittent headaches since. Her last dose of eliquis was on 12/11. NSGY consulted for CTH with SDH      Post-Op Info:  * No surgery found *         Interval History:  NAEON. AFVSS. Exam stable. Pain controlled.     Medications:  Continuous Infusions:    Scheduled Meds:   acetylcysteine 100 mg/ml (10%)  4 mL Nebulization QID WAKE    amLODIPine  10 mg Oral Daily    apixaban  5 mg Oral BID    atorvastatin  40 mg Oral Daily    famotidine  20 mg Oral BID    hydrOXYchloroQUINE  200 mg Oral BID    lacosamide  100 mg Oral Q12H    melatonin  12 mg Oral Nightly    montelukast  10 mg Oral Daily    mycophenolate mofetil  500 mg Oral BID    predniSONE  10 mg Oral Daily    QUEtiapine  12.5 mg Oral QHS    senna-docusate 8.6-50 mg  1 tablet Oral Daily    silodosin  4 mg Oral Daily    sodium chloride 0.9%  10 mL Intravenous Q6H    sodium chloride 3%  4 mL Nebulization Q4H     PRN Meds:acetaminophen, albuterol-ipratropium, bisacodyL, dextrose 50%, glucagon (human recombinant), guaiFENesin 100 mg/5 ml, insulin aspart U-100, labetalol, ondansetron, racepinephrine, Flushing PICC Protocol **AND** sodium chloride 0.9% **AND** sodium chloride 0.9%       Objective:     Weight: 67 kg (147 lb 11.3 oz)  Body mass index is 25.35 kg/m².  Vital Signs (Most Recent):  Temp: 98.3 °F (36.8 °C) (01/01/22 0748)  Pulse: 96 (01/01/22 0804)  Resp: 18 (01/01/22 0804)  BP: (!) 117/56 (01/01/22 0748)  SpO2: 99 % (01/01/22 0804) Vital Signs (24h Range):  Temp:  [97.2 °F (36.2  "°C)-98.9 °F (37.2 °C)] 98.3 °F (36.8 °C)  Pulse:  [] 96  Resp:  [16-24] 18  SpO2:  [94 %-100 %] 99 %  BP: (107-131)/(56-65) 117/56              NG/OG Tube 12/17/21 1405 Left nostril (Active)   Placement Check placement verified by aspirate characteristics 12/22/21 0305   Tolerance no signs/symptoms of discomfort 12/22/21 0305   Securement secured to nostril center 12/22/21 0305   Clamp Status/Tolerance no abdominal distention;no abdominal discomfort;no emesis;no nausea;no residual;no restlessness 12/22/21 0305   Suction Setting/Drainage Method suction at the bedside 12/22/21 0305   Insertion Site Appearance no redness, warmth, tenderness, skin breakdown, drainage 12/22/21 0305   Flush/Irrigation flushed w/;water;no resistance met 12/22/21 0305   Feeding Type continuous;by pump 12/22/21 0305   Feeding Action feeding continued 12/22/21 0305   Current Rate (mL/hr) 10 mL/hr 12/21/21 1905   Goal Rate (mL/hr) 10 mL/hr 12/21/21 1905   Intake (mL) 100 mL 12/20/21 0800   Water Bolus (mL) 200 mL 12/21/21 1600   Rate Formula Tube Feeding (mL/hr) 10 mL/hr 12/21/21 1905   Formula Name Isosource 12/22/21 0305   Intake (mL) - Formula Tube Feeding 20 12/21/21 1800   Residual Amount (ml) 0 ml 12/21/21 1905            Urethral Catheter 12/21/21 0300 Temperature probe 16 Fr. (Active)   Site Assessment Clean;Intact 12/22/21 0305   Collection Container Urimeter 12/22/21 0305   Securement Method secured to top of thigh w/ adhesive device 12/22/21 0305   Catheter Care Performed yes 12/22/21 0305   Reason for Continuing Urinary Catheterization Critically ill in ICU and requiring hourly monitoring of intake/output 12/22/21 0305   CAUTI Prevention Bundle StatLock in place w 1" slack;Intact seal between catheter & drainage tubing;Drainage bag/urimeter off the floor;Sheeting clip in use;No dependent loops or kinks;Drainage bag/urimeter not overfilled (<2/3 full);Drainage bag/urimeter below bladder 12/21/21 6816   Output (mL) 20 mL " 12/22/21 0700     Physical Exam    AAOx3, PERRL, EOMI, FS, TM, 5/5 throughout, SILT.  Abdomen soft  No resp distress  Extremities intact      Significant Labs:  Recent Labs   Lab 12/31/21  0956 01/01/22  0837    79    139   K 3.6 3.6   * 110   CO2 21* 22*   BUN 16 14   CREATININE 0.7 0.6   CALCIUM 8.3* 8.0*   MG 1.6 1.6     Recent Labs   Lab 12/31/21  0956 01/01/22  0837   WBC 17.82* 17.97*   HGB 9.3* 9.5*   HCT 29.6* 31.7*   * 144*     No results for input(s): LABPT, INR, APTT in the last 48 hours.  Microbiology Results (last 7 days)     Procedure Component Value Units Date/Time    Blood culture [[id number]] Collected: 12/21/21 1032    Order Status: Completed Specimen: Blood from Peripheral, Antecubital, Right Updated: 12/26/21 1212     Blood Culture, Routine No growth after 5 days.    Narrative:      Blood cultures x 2 different sites. 4 bottles total. Please  draw cultures before administering antibiotics.    Blood culture [[id number]] Collected: 12/21/21 1010    Order Status: Completed Specimen: Blood from Peripheral, Forearm, Right Updated: 12/26/21 1212     Blood Culture, Routine No growth after 5 days.    Narrative:      Blood cultures from 2 different sites. 4 bottles total.  Please draw before starting antibiotics.        All pertinent labs from the last 24 hours have been reviewed.    Significant Diagnostics:  I have reviewed and interpreted all pertinent imaging results/findings within the past 24 hours.    Assessment/Plan:     * SDH (subdural hematoma)  An 86F with on eliquis s/p head trauma 1 week prior now with small aSDH and tSAH and AMS c/b PEA arrest 12/20    Continue floor care per primary.   Neurocheck per protocol   Work up:  --CTH: R temp/paretial aSDH 5-6mm thickness, 3-5mm MLS, stable on repeat as well as imaging post PEA arrest  --Multiple Follow up CT stable, last 12/27  AED per Epilepsy  SBP<150  Na >135  Eliquis now resumed and CTH stable once on elequis.      Medical management per NCC  No neurosurigcal intervention at this time.  Neurosurgery signing off. Please obtain CTH prior to discharge. We will schedule patient for clinic follow-up 4 weeks from discharge with CTH.   Please page with questions or concerns.     Dispo: c/w floor care per primary.           Shady Sanchez MD  Neurosurgery  [facility] - Neurosurgery ([facility])

## 2022-01-01 NOTE — PROGRESS NOTES
Progress Note  Hospital Medicine    Patient: Selma Lux [id number]  Date of Service: 1/1/2022  Team: [facility] Tim Castillo MD  Length of Stay:  LOS: 19 days   Admission Date: 12/13/2021    SUBJECTIVE:     Follow-up For:  SDH (subdural hematoma)    HPI/Interval history (See H&P for complete P,F,SHx) :   The patient is an 86-year-old female with past medical history of TIA, RA and Afib of Eliquis admitted to [facility] with Right SDH.  She was found on the floor this morning only responding to painful stimuli.  Patient occasionally says her name.GCS 12 noted for EMS.  PCC administered. She had a mechanical fall out of bed on Thursday hitting her head and has been complaining of intermittent headaches since. Her last dose of eliquis was on 12/11. Pending repeat CT head. NSGY consulted. In addition patient was found to be hyponatremic at 118-central line placed and hypertonic saline infusion initiated. Pending repeat CT head. Patient admitted to [facility] for close monitoring and higher level of care.         Hospital Course: 12/13/2021: patient admitted to [facility] s/p fall on 12/11 on Eliquis  12/14/2021 wheezing, prolonged expiratory phase.   12/15/2021 copious respiratory secretions. Remains encephalopathic. Review EEG  12/16/2021 improved respiratory secretions. D/c nasal tracheal airway if minimal secretions.   12/17/2021 restless.   12/18/2021 lethargic, rising BUN/Cr ratio. Fluid resuscitation. Received flumazenil for BDZ   12/19/2021 encephalopathy overnight. Work up neg so far. BUN/Cr >20 from volume contraction.   12/20/2021 left facial twitching overnight. Loaded with vimpat.   12/21/2021 event overnight cardiac arrest/PEA likely related to respiratory distress. Intubated.   12/22/2021 Add precedex to enable vent weaning.  12/23/2021   extubation trial today  12/24/2021 extubated over cook catheter due to concern for laryngeal edema. Successfully extubated this am. Close monitoring.   12/25/2021 agitated delirium  overnight.   12/26/2021 increase cough assist frequency.   12/27: pulmonary toilette, prednisone, daughter updated at bedside, change diet to puree  12/28: resume home apixaban, rescan head tonight, continue pulmonary toilette, can probably step down in AM, melatonin at 22 hr  12/29/2021 CT head stable./ Pureed diet started. Stepping down to medicine.    Interval history  12/30  Increased delirium this am. Eliquis now resumed and CT head stable on elequis.  s/p neurosurgery f/u. No neurosurigcal intervention at this time. plan for CTH head before discharge. SLP recs Mechanical soft, Liquid Diet Level: Thin   urinary retention overrnight -straight cath x1 for PVR >350 cc  12/31  Transfer to hospital medicine. delirium improving . leucocytosis tending down to  17   P replaced. neurocritical care recs to continue vimpat x 3 months and f/u in epilepsy clinic. started on robitussin for cough   1/1 discussed with daughter Rosy Rosado [phone] . admitted with sodium 118. was on lasix 40mg daily. had chronic head ache and following Dr. Ac neurology . has another medical record number - [id number]. was on prednisone 10mg for , with prior history of intubation 2018 . Rheumatology consulted for RA/ - immunosuppressants - optimal dosing. Patient was started on the cellcept by Dr. Rouse due to concern for COOP after discussing it with pulm Dr. Francis in 2019. continue  cellcept 500mg BID. needs follow up outpatient with Dr. Rouse. hydroxychloroquine decreased to 300mg once daily. started on calcium and vitamin D. Urinary retention x2  overnight-  500ml each . Hill catheter placement .needs CTHead  prior to discharge.      Review of Systems:   Pain scale:  Constitutional:  fever,  chills, headache, vision loss, hearing loss, weight loss, + Generalized weakness, falls, loss of smell, loss of taste, poor appetite,  sore throat , + immunocompromised state.   Respiratory:+  cough, shortness of breath.    Cardiovascular: chest pain, dizziness, palpitations, orthopnea, swelling of feet, syncope  Gastrointestinal: nausea, vomiting, abdominal pain, diarrhea, black stool,  beeding per rectum,  change in bowel habits + trouble swallowing  Genitourinary: hematuria, dysuria, urgency, frequency  Integument/Breast: rash,  pruritis  Hematologic/Lymphatic: easy bruising, lymphadenopathy  Musculoskeletal: arthralgias , myalgias, back pain, neck pain, knee pain  Neurological: + confusion, seizures, tremors, slurred speech  Behavioral/Psych:  depression, anxiety, auditory or visual hallucinations     OBJECTIVE:     Physical Exam:  Body mass index is 25.35 kg/m².    Constitutional: Appears well-developed and well-nourished.   Head: Normocephalic and atraumatic.   Neck: Normal range of motion. Neck supple.   Cardiovascular: Normal heart rate.  Regular heart rhythm.  Pulmonary/Chest: Effort normal.   Abdominal: No distension.  No tenderness  Musculoskeletal: Normal range of motion. No edema.   Neurological: Alert and oriented to person, place, and time. power 4/5. able to move bilateral upper and lower extremities without limitation   Skin: Skin is warm and dry.   Psychiatric: Normal mood and affect. Behavior is normal.                  Vital Signs  Temp: 98.4 °F (36.9 °C) (01/01/22 1545)  Pulse: 98 (01/01/22 1633)  Resp: (Abnormal) 26 (01/01/22 1633)  BP: 130/60 (01/01/22 1545)  SpO2: 98 % (01/01/22 1633)     24 Hour VS Range    Temp:  [97.8 °F (36.6 °C)-98.9 °F (37.2 °C)]   Pulse:  []   Resp:  [16-26]   BP: (106-130)/(52-64)   SpO2:  [94 %-99 %]     Intake/Output Summary (Last 24 hours) at 1/1/2022 1853  Last data filed at 1/1/2022 0600  Gross per 24 hour   Intake no documentation   Output 500 ml   Net -500 ml         I/O This Shift:  No intake/output data recorded.    Wt Readings from Last 3 Encounters:   12/19/21 67 kg (147 lb 11.3 oz)       I have personally reviewed the vitals and recorded Intake/Output      Laboratory/Diagnostic Data:    CBC/Anemia Labs: Coags:    Recent Labs   Lab 12/29/21  1124 12/31/21  0956 01/01/22  0837   WBC 19.62* 17.82* 17.97*   HGB 9.0* 9.3* 9.5*   HCT 29.7* 29.6* 31.7*   * 108* 144*   MCV 95 93 95   RDW 17.5* 17.5* 17.9*    No results for input(s): PT, INR, APTT in the last 168 hours.     Chemistries: ABG:   Recent Labs   Lab 12/26/21  0028 12/26/21  0028 12/29/21  1124 12/31/21  0956 01/01/22  0837      < > 142 140 139   K 3.7   < > 3.7 3.6 3.6      < > 113* 111* 110   CO2 21*   < > 22* 21* 22*   BUN 29*   < > 23 16 14   CREATININE 0.7   < > 0.7 0.7 0.6   CALCIUM 8.4*   < > 8.3* 8.3* 8.0*   PROT 4.8*  --  4.8* 4.8*  --    BILITOT 1.5*  --  1.3* 1.3*  --    ALKPHOS 57  --  62 69  --    ALT 30  --  28 29  --    AST 26  --  21 24  --    MG  --   --  1.7 1.6 1.6   PHOS  --   --  1.7* 2.2* 2.2*  2.2*    < > = values in this interval not displayed.    No results for input(s): PH, PCO2, PO2, HCO3, POCSATURATED, BE in the last 168 hours.     POCT Glucose: HbA1c:    Recent Labs   Lab 12/31/21  1131 12/31/21  1653 01/01/22  0616 01/01/22  0807 01/01/22  1242 01/01/22  1737   POCTGLUCOSE 127* 199* 86 84 158* 182*    Hemoglobin A1C   Date Value Ref Range Status   12/13/2021 5.3 4.0 - 5.6 % Final     Comment:     ADA Screening Guidelines:  5.7-6.4%  Consistent with prediabetes  >or=6.5%  Consistent with diabetes    High levels of fetal hemoglobin interfere with the HbA1C  assay. Heterozygous hemoglobin variants (HbS, HgC, etc)do  not significantly interfere with this assay.   However, presence of multiple variants may affect accuracy.          Cardiac Enzymes: Ejection Fractions:    No results for input(s): CPK, CPKMB, MB, TROPONINI in the last 72 hours. EF   Date Value Ref Range Status   12/13/2021 65 % Final          No results for input(s): COLORU, APPEARANCEUA, PHUR, SPECGRAV, PROTEINUA, GLUCUA, KETONESU, BILIRUBINUA, OCCULTUA, NITRITE, UROBILINOGEN, LEUKOCYTESUR, RBCUA,  WBCUA, BACTERIA, SQUAMEPITHEL, HYALINECASTS in the last 48 hours.    Invalid input(s): WRIGHTSUR    Procalcitonin (ng/mL)   Date Value   12/22/2021 0.67 (H)   12/21/2021 0.59 (H)     No results found for: BNP  No results found for: CRP, SEDRATE  No results found for: DDIMER  No results found for: FERRITIN  No results found for: LDH  CPK (U/L)   Date Value   12/13/2021 123     No results found for this or any previous visit.  POC Rapid COVID (no units)   Date Value   12/13/2021 Negative       Microbiology labs for the last week  Microbiology Results (last 7 days)     Procedure Component Value Units Date/Time    Blood culture [[id number]] Collected: 12/21/21 1032    Order Status: Completed Specimen: Blood from Peripheral, Antecubital, Right Updated: 12/26/21 1212     Blood Culture, Routine No growth after 5 days.    Narrative:      Blood cultures x 2 different sites. 4 bottles total. Please  draw cultures before administering antibiotics.    Blood culture [[id number]] Collected: 12/21/21 1010    Order Status: Completed Specimen: Blood from Peripheral, Forearm, Right Updated: 12/26/21 1212     Blood Culture, Routine No growth after 5 days.    Narrative:      Blood cultures from 2 different sites. 4 bottles total.  Please draw before starting antibiotics.          Reviewed and noted in plan where applicable- Please see chart for full lab data.    Lines/Drains:  PICC Double Lumen 12/15/21 1616 right basilic (Active)   Verification by X-ray Yes 12/30/21 0305   Site Assessment No drainage;No redness;No swelling;No warmth 12/30/21 0305   Extremity Assessment Distal to IV No abnormal discoloration;No redness;No swelling;No warmth 12/30/21 0305   Line Securement Device Secured with sutureless device 12/30/21 0305   Dressing Type Biopatch in place;Central line dressing 12/30/21 0305   Dressing Status Clean;Dry;Intact 12/30/21 0305   Dressing Intervention Integrity maintained 12/30/21 0305   Date on Dressing 12/30/21 12/30/21  0305   Dressing Due to be Changed 12/06/21 12/30/21 0305   Left Lumen Patency/Care Flushed w/o difficulty 12/30/21 0305   Right Lumen Patency/Care Flushed w/o difficulty 12/30/21 0305   Current Insertion Depth (cm) 33 cm 12/29/21 1505   Current Exposed Catheter (cm) 0 cm 12/28/21 0705   Extremity Circumference (cm) 33 cm 12/15/21 1616   Waveform Normal 12/25/21 1505   Line Necessity Review Poor venous access 12/30/21 0305   Number of days: 14       Imaging  ECG Results          ECG 12 lead (Final result)  Result time 12/21/21 14:15:54    Final result by Interface, Lab In [facility] (12/21/21 14:15:54)             Narrative:    Test Reason : I63.9,    Vent. Rate : 126 BPM     Atrial Rate : 252 BPM     P-R Int : 000 ms          QRS Dur : 076 ms      QT Int : 330 ms       P-R-T Axes : 265 010 -53 degrees     QTc Int : 477 ms    Atrial flutter with 2:1 A-V conduction    Right bundle branch block  ST and T wave abnormality, consider inferior ischemia  Abnormal ECG  When compared with ECG of 13-DEC-2021 10:53,  Atrial flutter has replaced Sinus rhythm  ST now depressed in Lateral leads  T wave inversion now evident in Inferior leads  Confirmed by Mukesh Cunningham MD ([id number]) on 12/21/2021 2:15:46 PM    Referred By: [email or web address]   SELF           Confirmed By:Mukesh Cunningham MD                           EKG 12-lead (Final result)  Result time 12/13/21 15:34:33    Final result by Interface, Lab In [facility] (12/13/21 15:34:33)             Narrative:    Test Reason : S06.5X9A,    Vent. Rate : 100 BPM     Atrial Rate : 100 BPM     P-R Int : 150 ms          QRS Dur : 088 ms      QT Int : 358 ms       P-R-T Axes : 058 -34 045 degrees     QTc Int : 461 ms    Normal sinus rhythm  Left axis deviation  Abnormal ECG  No previous ECGs available  Confirmed by Jerod MICHAEL MD ([id number]) on 12/13/2021 3:34:24 PM    Referred By: [email or web address]   SELF           Confirmed By:Jerod MICHAEL MD                            Results for orders placed during the hospital  encounter of 12/13/21    Echo    Interpretation Summary  · Technically difficult study  · The left ventricle is normal in size with concentric hypertrophy and normal systolic function. The estimated ejection fraction is 65%.  · Normal right ventricular size with normal right ventricular systolic function.  · Normal left ventricular diastolic function.  · Normal central venous pressure (3 mmHg).      CT Head Without Contrast  Narrative: EXAMINATION:  CT HEAD WITHOUT CONTRAST    CLINICAL HISTORY:  Stroke, follow up;    TECHNIQUE:  Low dose axial CT images obtained throughout the head without intravenous contrast. Sagittal and coronal reconstructions were performed.    COMPARISON:  CT head 12/27/2021    FINDINGS:  Right sided low-density subdural collection measuring 1.2 cm in maximal thickness with minimal extension along the right tentorial leaflet (coronal series 601, image 50).  It appears stable in size without evidence of hyperdense component to suggest acute hemorrhage.  Persistent mass effect resulting in regional sulcal effacement and minimal leftward midline shift measuring in the order of 3 mm at the level of the septum pellucidum, stable.    Patchy periventricular white matter hypoattenuation which is nonspecific, however likely related to chronic ischemic microvascular changes.  No new parenchymal hypoattenuation to suggest acute infarction.    Mass effect compresses the right lateral ventricle.  Asymmetrical prominence of the left temporal horn which could be related to generalized volume loss versus less likely ventricular entrapment and also stable.    Skull/extracranial contents (limited evaluation): No fracture.  Aerated secretions within the right sphenoid sinus.  The remaining paranasal sinuses and mastoid air cells are clear.    Soft tissue density filling the right external auditory canal a, likely represents impacted cerumen.  Bilateral ICAs calcific atherosclerosis.  Impression: Unchanged  appearance of right sided subdural collection with associated mass effect and stable mild leftward midline shift.  No new extra-axial or parenchymal hemorrhage.    Mild right sphenoid sinus fluid.    Electronically signed by resident: Jesenia Euceda MD  Date:    12/28/2021  Time:    14:42    Electronically signed by: Carlos Bull  Date:    12/28/2021  Time:    15:03      Labs, Imaging, EKG and Diagnostic results from 1/1/2022 were reviewed.    Medications:  Medication list was reviewed and changes noted under Assessment/Plan.  No current facility-administered medications on file prior to encounter.     Current Outpatient Medications on File Prior to Encounter   Medication Sig Dispense Refill    amLODIPine (NORVASC) 10 MG tablet Take 10 mg by mouth once daily.      atorvastatin (LIPITOR) 40 MG tablet Take 40 mg by mouth once daily.      baclofen (LIORESAL) 10 MG tablet Take 10 mg by mouth 3 (three) times daily.      ELIQUIS 5 mg Tab Take 5 mg by mouth 2 (two) times daily.      furosemide (LASIX) 40 MG tablet Take 40 mg by mouth once daily.      gabapentin (NEURONTIN) 300 MG capsule Take 300 mg by mouth once daily.      hydrOXYchloroQUINE (PLAQUENIL) 200 mg tablet Take 200 mg by mouth 2 (two) times daily.      montelukast (SINGULAIR) 10 mg tablet Take 10 mg by mouth once daily.      mycophenolate (CELLCEPT) 500 mg Tab Take 500 mg by mouth 2 (two) times daily.      NURTEC 75 mg odt Take by mouth.      predniSONE (DELTASONE) 5 MG tablet Take 5 mg by mouth once daily.      sumatriptan (IMITREX) 50 MG tablet Take by mouth.       Scheduled Medications:  acetylcysteine 100 mg/ml (10%), 4 mL, Nebulization, QID WAKE  amLODIPine, 10 mg, Oral, Daily  apixaban, 5 mg, Oral, BID  atorvastatin, 40 mg, Oral, Daily  calcium carbonate, 1,000 mg, Oral, Daily  famotidine, 20 mg, Oral, BID  [START ON 1/2/2022] hydrOXYchloroQUINE, 300 mg, Oral, Daily  lacosamide, 100 mg, Oral, Q12H  melatonin, 12 mg, Oral,  Nightly  montelukast, 10 mg, Oral, Daily  mycophenolate mofetil, 500 mg, Oral, BID  predniSONE, 10 mg, Oral, Daily  QUEtiapine, 12.5 mg, Oral, QHS  senna-docusate 8.6-50 mg, 1 tablet, Oral, Daily  silodosin, 4 mg, Oral, Daily  sodium chloride 0.9%, 10 mL, Intravenous, Q6H  sodium chloride 3%, 4 mL, Nebulization, Q4H  vitamin D, 2,000 Units, Oral, Daily      PRN: acetaminophen, albuterol-ipratropium, bisacodyL, dextrose 50%, glucagon (human recombinant), guaiFENesin 100 mg/5 ml, insulin aspart U-100, labetalol, ondansetron, racepinephrine, Flushing PICC Protocol **AND** sodium chloride 0.9% **AND** sodium chloride 0.9%  Infusions:   Estimated Creatinine Clearance: 65.7 mL/min (based on SCr of 0.6 mg/dL).    ASSESSMENT/PLAN:   Overview:  Selma Lux is a 86 y.o. female with medical history significant for     Active Hospital Problems    Diagnosis  POA    *SDH (subdural hematoma) [S06.5X9A]-SDH with brain compression  -non surgical  -repeat imaging reviewed stable  - was on apixaban and reversed with PCC on admission  - apixaban resumed per NSGY, repeat CT head stable  - Neurosurgery follow up in clinic with repeat scam, ordered.  12/30  Increased delirium this am. Eliquis now resumed and CT head stable on elequis.  s/p neurosurgery f/u. No neurosurigcal intervention at this time. plan for CTH head before discharge  12/31  Transfer to hospital medicine. delirium improving .neurocritical care recs to continue vimpat x 3 months and f/u in epilepsy clinic  needs CTHead  prior to discharge.      Metabolic encephalopathy- secondary to above   Patient with acute delirium. Will avoid narcotics/benzos that are known to worsen condition.  PRN antipsychotics to limit behaviors of self harm. Monitor closely.  12/30   Improving  Continues to perseverate on the dates  Encourage sleep at night and awake during the day  Delirium precautions  Melatonin at night   Prn quetiapine  12/31 AAOx3,      Dysphagia - seconary to above  12/30  . SLP recs Mechanical soft, Liquid Diet Level: Thin       Urinary retention overrnight -1/1 . Urinary retention x2  overnight-  500ml each . Hill catheter placement if further retention        Yes    Cardiac arrest [I46.9] PEA arrest 12/20 with Respiratory arrest   Now post extubation    Anemia   Patient's with Normocytic anemia.. Hemoglobin stable. Etiology likely due to chronic disease .  Current CBC reviewed-    Recent Labs   Lab 12/29/21  1124 12/31/21  0956 01/01/22  0837   HGB 9.0* 9.3* 9.5*         Component Value Date/Time    MCV 95 01/01/2022 0837    RDW 17.9 (H) 01/01/2022 0837     Monitor CBC and transfuse if H/H drops below 7/21.   No    Twitching [R25.3]no acute issues   No    Stroke [I63.9]small aSDH and tSAH. s/p neurosurgery eval . as above   No    Hyponatremia [E87.1]resoved     Thrombocytopenia  Patient with thrombocytopenia   Recent Labs   Lab 12/29/21  1124 12/31/21  0956 01/01/22  0837   * 108* 144*   . Platelet counts uptrending.monitor  Yes    Age-related physical debility [R54]PT following . needs rehab   Yes    Leukocytosis [D72.829]     Patient with leucocytosis   Recent Labs   Lab 12/29/21  1124 12/31/21  0956 01/01/22  0837   WBC 19.62* 17.82* 17.97*     . Afebrile. BCX 2 12/21 NGTD , urine culture pending . likely secondary to steroids..  WBC counts stable.  sputum culture with E coli 12/21. completed 5 days of cefepime     Hypophosphatemia - Patient with phosphorus   Recent Labs   Lab 12/29/21  1124 12/31/21  0956 01/01/22  0837   PHOS 1.7* 2.2* 2.2*  2.2*   . replaced. monitor  Yes    RA (rheumatoid arthritis) [M06.9]   --continue mycophenolate and hydroxychloroquine  --continue steroids  --will switch to prednisone as she was on at home, family requesting  --30mg so higher than home dose  1/1 discussed with daughter Rosy Rosado [phone] . admitted with sodium 118. was on lasix 40mg daily. had chronic head ache and following Dr. Ac neurology . has another  medical record number - [id number]. was on prednisone 10mg for COOP, with prior history of intubation 2018 Rheumatology consulted for RA/ - immunosuppressants - optimal dosing. Patient was started on the cellcept by Dr. Rouse due to concern for COOP after discussing it with pulm Dr. Francis in 2019. continue  cellcept 500mg BID. needs follow up outpatient with Dr. Rouse. hydroxychloroquine decreased to 300mg once daily. started on calcium and vitamin D.  needs DEXA scan outpatient  Yes      Resolved Hospital Problems   No resolved problems to display.       Disposition- Rehab    Discharge Planning   LETICIA: 1/4/2022     Code Status: Full Code   Is the patient medically ready for discharge?: No    Reason for patient still in hospital (select all that apply): Treatment  Discharge Plan A: Rehab         DVT prophylaxis addressed with:  Eliquis.    [facility] Care    Level 3 27514 Total visit time was 35 minutes or greater with greater than 50% of time spent in counseling and coordination of care. .    We discussed in detail the plan of care, the patient's response to treatment, the discharge plan.  Total time includes time spent reviewing the medical record, examining the patient, writing notes and communicating with other professionals.     Tim Castillo MD  Attending Staff Physician  [facility] Medicine  pager- [phone]  Spectralkpn - [phone]

## 2022-01-01 NOTE — ASSESSMENT & PLAN NOTE
An 86F with on eliquis s/p head trauma 1 week prior now with small aSDH and tSAH and AMS c/b PEA arrest 12/20    Continue floor care per primary.   Neurocheck per protocol   Work up:  --CTH: R temp/paretial aSDH 5-6mm thickness, 3-5mm MLS, stable on repeat as well as imaging post PEA arrest  --Multiple Follow up CT stable, last 12/27  AED per Epilepsy  SBP<150  Na >135  Eliquis now resumed and CTH stable once on elequis.     Medical management per NCC  No neurosurigcal intervention at this time.  Neurosurgery signing off. Please obtain CTH prior to discharge. We will schedule patient for clinic follow-up 4 weeks from discharge with CTH.   Please page with questions or concerns.     Dispo: c/w floor care per primary.

## 2022-01-01 NOTE — CONSULTS
[facility] - Neurosurgery ([facility])  Rheumatology  Consult Note    Patient Name: Selma Lux  MRN: [id number]  Admission Date: 12/13/2021  Hospital Length of Stay: 19 days  Code Status: Full Code   Attending Provider: Tim Castillo MD  Primary Care Physician: Bhargav Hirsch MD  Principal Problem:SDH (subdural hematoma)    Inpatient consult to Rheumatology  Consult performed by: Sadia Ness MD  Consult ordered by: Tim Castillo MD        Subjective:     HPI: Selma Lux is a 86 y.o. 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     She initially presented on 12/13/21 after she was found on the floor and responsive to only painful stimuli. Prior to this, family reports that she having headaches and following in neurology clinic due to chronic history of migraine. They deny any flares of joint pain or increased SOB/wheezing. She is not on oxygen at home. Labs were significant for Sodium 124 on presentation. CT head showed right subdural hemorrhage with leftward shift. She was admitted to the NICU for closer monitoring. Etiology of hyponatremia initially unclear but seemed to respond to IV hydration. She was placed on methylpred 20mg for her history of cryptogenic organizing pneumonia which was later converted to prednisone 30mg. She was later stepped down to  on 12/31. Prednisone was resumed to her home dose prednisone 10mg.      Of note, she has been following with Dr. Rouse since 2018 for seropositive RA (followed with Dr. Qureshi previously). In April 2018, she was hospitalized for respiratory failure and found to have suspected cryptogenic organizing pneumonia (no biopsy performed). She was then started on cellcept 500mg BID after discussion  between her rheumatologist and pulmonologist in 2019. She has been on cellcept 500mg BID since that date. She also is on plaquenil 200mg BID and prednisone 10mg. 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 She has had additional admissions for encephalopathy since that time.    Rheumatology was consulted for management on RA medications.      Past Medical History:   Diagnosis Date    Delirium 12/30/2021       History reviewed. No pertinent surgical history.      There is no immunization history on file for this patient.    Review of patient's allergies indicates:  No Known Allergies  Current Facility-Administered Medications   Medication Frequency    acetaminophen tablet 650 mg Q6H PRN    acetylcysteine 100 mg/ml (10%) solution 4 mL QID WAKE    albuterol-ipratropium 2.5 mg-0.5 mg/3 mL nebulizer solution 3 mL Q4H PRN    alteplase injection 2 mg Once    amLODIPine tablet 10 mg Daily    apixaban tablet 5 mg BID    atorvastatin tablet 40 mg Daily    bisacodyL suppository 10 mg Daily PRN    dextrose 50% injection 12.5 g PRN    famotidine tablet 20 mg BID    glucagon (human recombinant) injection 1 mg PRN    guaiFENesin 100 mg/5 ml syrup 200 mg Q4H PRN    hydrOXYchloroQUINE tablet 200 mg BID    insulin aspart U-100 pen 1-10 Units Q4H PRN    labetalol 20 mg/4 mL (5 mg/mL) IV syring Q6H PRN    lacosamide 10 mg/mL liquid 100 mg Q12H    melatonin tablet 12 mg Nightly    montelukast tablet 10 mg Daily    mycophenolate mofetil 200 mg/mL suspension 500 mg BID    ondansetron injection 4 mg Q6H PRN    predniSONE tablet 10 mg Daily    quetiapine split tablet 12.5 mg QHS    racepinephrine 2.25 % nebulizer solution 0.5 mL Q4H PRN    senna-docusate 8.6-50 mg per tablet 1 tablet Daily    silodosin capsule 4 mg Daily    sodium chloride 0.9% flush 10 mL Q6H    And    sodium chloride 0.9% flush 10 mL PRN    sodium chloride 3% nebulizer solution 4 mL  Q4H     Family History    None       Tobacco Use    Smoking status: Not on file    Smokeless tobacco: Not on file   Substance and Sexual Activity    Alcohol use: Not on file    Drug use: Not on file    Sexual activity: Not on file     Review of Systems   Constitutional: Negative for chills, fatigue and fever.   HENT: Negative for hearing loss, sore throat and trouble swallowing.    Eyes: Negative for visual disturbance.   Respiratory: Negative for cough and shortness of breath.    Cardiovascular: Negative for chest pain.   Gastrointestinal: Negative for abdominal pain, constipation, diarrhea, nausea and vomiting.   Genitourinary: Negative for dysuria and frequency.   Musculoskeletal: Negative for arthralgias and myalgias.   Skin: Negative for rash.   Neurological: Negative for dizziness, weakness and headaches.     Objective:     Vital Signs (Most Recent):  Temp: 97.8 °F (36.6 °C) (01/01/22 1123)  Pulse: 101 (01/01/22 1123)  Resp: 16 (01/01/22 1123)  BP: (!) 106/52 (01/01/22 1123)  SpO2: 99 % (01/01/22 1123)  O2 Device (Oxygen Therapy): room air (01/01/22 0804) Vital Signs (24h Range):  Temp:  [97.2 °F (36.2 °C)-98.9 °F (37.2 °C)] 97.8 °F (36.6 °C)  Pulse:  [] 101  Resp:  [16-18] 16  SpO2:  [94 %-100 %] 99 %  BP: (106-131)/(52-65) 106/52     Weight: 67 kg (147 lb 11.3 oz) (12/19/21 0500)  Body mass index is 25.35 kg/m².  Body surface area is 1.74 meters squared.      Intake/Output Summary (Last 24 hours) at 1/1/2022 1139  Last data filed at 1/1/2022 0600  Gross per 24 hour   Intake --   Output 1000 ml   Net -1000 ml       Physical Exam   Constitutional: She is oriented to person, place, and time and well-developed, well-nourished, and in no distress. No distress.   HENT:   Head: Normocephalic and atraumatic.   Eyes: Conjunctivae are normal.   Cardiovascular: Normal rate, regular rhythm and normal heart sounds.  Exam reveals no friction rub.    No murmur heard.  Pulmonary/Chest: Effort normal and breath  sounds normal. She has no wheezes.   Scattered crackles   Abdominal: Soft. Bowel sounds are normal. There is no abdominal tenderness. There is no rebound.   MSK: No synovitis on exam  Neurological: She is alert and oriented to person, place, and time.   Skin: Skin is warm and dry. She is not diaphoretic.         Significant Labs:  BMP:   Recent Labs   Lab 01/01/22  0837   GLU 79      K 3.6      CO2 22*   BUN 14   CREATININE 0.6   CALCIUM 8.0*   MG 1.6     CBC:   Recent Labs   Lab 01/01/22  0837   WBC 17.97*   HGB 9.5*   HCT 31.7*   *     Assessment/Plan:     RA (rheumatoid arthritis)  Selma Lux is a 86 y.o. 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 Rheumatology was consulted for recommendations on current medication regimen.    Following with Dr. Rouse since 2018 for seropositive RA (followed with Dr. Qureshi previously). In April 2018, she was hospitalized for respiratory failure and found to have suspected cryptogenic organizing pneumonia (no biopsy performed). She was then started on cellcept 500mg BID after discussion between her rheumatologist and pulmonologist in 2019. She has been on cellcept 500mg BID since that date. She also is on plaquenil 200mg BID and prednisone 10mg.     Imaging:  CXR: Widening of the mediastinum with findings highly suggestive of a right paratracheal mass with deviation of the trachea to the left as above.  Further evaluation of the mediastinum with a dedicated CT scan of the chest with contrast is suggested.    Recommendations:  - okay to continue current regimen with cellcept 500mg BID, plaquenil 200mg BID and prednisone 10mg  - can consider additional imaging given abnormality in CXR   - will need follow up with   Nasim on discharge          Thank you for your consult. See staff attestation for final recommendations.    Sadia Ness MD  Rheumatology  [facility] - Neurosurgery ([facility])      H/o RA on hydroxychloroquine 200mg twice daily, suggest decrease to 300mg once daily based on 5mg/kg recommended dosing  H/o  on mycophenolate 500mg bid and prednisone 10mg daily  DXA  Recheck vitamin D level  1200mg calcium daily, vitamin D3 2000 units daily  F/u Dr. Rouse in Rheumatology as outpatient        Carlos Rodgers MD  Rheumatology  [phone]

## 2022-01-01 NOTE — HPI
Selma Lux is a 86 y.o. 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     She initially presented on 12/13/21 after she was found on the floor and responsive to only painful stimuli. Prior to this, family reports that she having headaches and following in neurology clinic due to chronic history of migraine. They deny any flares of joint pain or increased SOB/wheezing. She is not on oxygen at home. Labs were significant for Sodium 124 on presentation. CT head showed right subdural hemorrhage with leftward shift. She was admitted to the NICU for closer monitoring. Etiology of hyponatremia initially unclear but seemed to respond to IV hydration. She was placed on methylpred 20mg for her history of cryptogenic organizing pneumonia which was later converted to prednisone 30mg. She was later stepped down to  on 12/31. Prednisone was resumed to her home dose prednisone 10mg.      Of note, she has been following with Dr. Rouse since 2018 for seropositive RA (followed with Dr. Qureshi previously). In April 2018, she was hospitalized for respiratory failure and found to have suspected cryptogenic organizing pneumonia (no biopsy performed). She was then started on cellcept 500mg BID after discussion between her rheumatologist and pulmonologist in 2019. She has been on cellcept 500mg BID since that date. She also is on plaquenil 200mg BID and prednisone 10mg. 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 She has had additional admissions for encephalopathy since that time.    Rheumatology was consulted for management on RA medications.

## 2022-01-01 NOTE — SUBJECTIVE & OBJECTIVE
Past Medical History:   Diagnosis Date    Delirium 12/30/2021       History reviewed. No pertinent surgical history.      There is no immunization history on file for this patient.    Review of patient's allergies indicates:  No Known Allergies  Current Facility-Administered Medications   Medication Frequency    acetaminophen tablet 650 mg Q6H PRN    acetylcysteine 100 mg/ml (10%) solution 4 mL QID WAKE    albuterol-ipratropium 2.5 mg-0.5 mg/3 mL nebulizer solution 3 mL Q4H PRN    alteplase injection 2 mg Once    amLODIPine tablet 10 mg Daily    apixaban tablet 5 mg BID    atorvastatin tablet 40 mg Daily    bisacodyL suppository 10 mg Daily PRN    dextrose 50% injection 12.5 g PRN    famotidine tablet 20 mg BID    glucagon (human recombinant) injection 1 mg PRN    guaiFENesin 100 mg/5 ml syrup 200 mg Q4H PRN    hydrOXYchloroQUINE tablet 200 mg BID    insulin aspart U-100 pen 1-10 Units Q4H PRN    labetalol 20 mg/4 mL (5 mg/mL) IV syring Q6H PRN    lacosamide 10 mg/mL liquid 100 mg Q12H    melatonin tablet 12 mg Nightly    montelukast tablet 10 mg Daily    mycophenolate mofetil 200 mg/mL suspension 500 mg BID    ondansetron injection 4 mg Q6H PRN    predniSONE tablet 10 mg Daily    quetiapine split tablet 12.5 mg QHS    racepinephrine 2.25 % nebulizer solution 0.5 mL Q4H PRN    senna-docusate 8.6-50 mg per tablet 1 tablet Daily    silodosin capsule 4 mg Daily    sodium chloride 0.9% flush 10 mL Q6H    And    sodium chloride 0.9% flush 10 mL PRN    sodium chloride 3% nebulizer solution 4 mL Q4H     Family History    None       Tobacco Use    Smoking status: Not on file    Smokeless tobacco: Not on file   Substance and Sexual Activity    Alcohol use: Not on file    Drug use: Not on file    Sexual activity: Not on file     Review of Systems   Constitutional: Negative for chills, fatigue and fever.   HENT: Negative for hearing loss, sore throat and trouble swallowing.    Eyes: Negative  for visual disturbance.   Respiratory: Negative for cough and shortness of breath.    Cardiovascular: Negative for chest pain.   Gastrointestinal: Negative for abdominal pain, constipation, diarrhea, nausea and vomiting.   Genitourinary: Negative for dysuria and frequency.   Musculoskeletal: Negative for arthralgias and myalgias.   Skin: Negative for rash.   Neurological: Negative for dizziness, weakness and headaches.     Objective:     Vital Signs (Most Recent):  Temp: 97.8 °F (36.6 °C) (01/01/22 1123)  Pulse: 101 (01/01/22 1123)  Resp: 16 (01/01/22 1123)  BP: (!) 106/52 (01/01/22 1123)  SpO2: 99 % (01/01/22 1123)  O2 Device (Oxygen Therapy): room air (01/01/22 0804) Vital Signs (24h Range):  Temp:  [97.2 °F (36.2 °C)-98.9 °F (37.2 °C)] 97.8 °F (36.6 °C)  Pulse:  [] 101  Resp:  [16-18] 16  SpO2:  [94 %-100 %] 99 %  BP: (106-131)/(52-65) 106/52     Weight: 67 kg (147 lb 11.3 oz) (12/19/21 0500)  Body mass index is 25.35 kg/m².  Body surface area is 1.74 meters squared.      Intake/Output Summary (Last 24 hours) at 1/1/2022 1139  Last data filed at 1/1/2022 0600  Gross per 24 hour   Intake --   Output 1000 ml   Net -1000 ml       Physical Exam   Constitutional: She is oriented to person, place, and time and well-developed, well-nourished, and in no distress. No distress.   HENT:   Head: Normocephalic and atraumatic.   Eyes: Conjunctivae are normal.   Cardiovascular: Normal rate, regular rhythm and normal heart sounds.  Exam reveals no friction rub.    No murmur heard.  Pulmonary/Chest: Effort normal and breath sounds normal. She has no wheezes.   Scattered crackles   Abdominal: Soft. Bowel sounds are normal. There is no abdominal tenderness. There is no rebound.   Neurological: She is alert and oriented to person, place, and time.   Skin: Skin is warm and dry. She is not diaphoretic.         Significant Labs:  BMP:   Recent Labs   Lab 01/01/22  0837   GLU 79      K 3.6      CO2 22*   BUN 14    CREATININE 0.6   CALCIUM 8.0*   MG 1.6     CBC:   Recent Labs   Lab 01/01/22  0837   WBC 17.97*   HGB 9.5*   HCT 31.7*   *

## 2022-01-01 NOTE — PROGRESS NOTES
Progress Note  Hospital Medicine    Patient: Selma Lux [id number]  Date of Service: 12/31/2021  Team: [facility] B Tim Castillo MD  Length of Stay:  LOS: 18 days   Admission Date: 12/13/2021    SUBJECTIVE:     Follow-up For:  SDH (subdural hematoma)    HPI/Interval history (See H&P for complete P,F,SHx) :   The patient is an 86-year-old female with past medical history of TIA, RA and Afib of Eliquis admitted to [facility] with Right SDH.  She was found on the floor this morning only responding to painful stimuli.  Patient occasionally says her name.GCS 12 noted for EMS.  PCC administered. She had a mechanical fall out of bed on Thursday hitting her head and has been complaining of intermittent headaches since. Her last dose of eliquis was on 12/11. Pending repeat CT head. NSGY consulted. In addition patient was found to be hyponatremic at 118-central line placed and hypertonic saline infusion initiated. Pending repeat CT head. Patient admitted to [facility] for close monitoring and higher level of care.         Hospital Course: 12/13/2021: patient admitted to [facility] s/p fall on 12/11 on Eliquis  12/14/2021 wheezing, prolonged expiratory phase.   12/15/2021 copious respiratory secretions. Remains encephalopathic. Review EEG  12/16/2021 improved respiratory secretions. D/c nasal tracheal airway if minimal secretions.   12/17/2021 restless.   12/18/2021 lethargic, rising BUN/Cr ratio. Fluid resuscitation. Received flumazenil for BDZ   12/19/2021 encephalopathy overnight. Work up neg so far. BUN/Cr >20 from volume contraction.   12/20/2021 left facial twitching overnight. Loaded with vimpat.   12/21/2021 event overnight cardiac arrest/PEA likely related to respiratory distress. Intubated.   12/22/2021 Add precedex to enable vent weaning.  12/23/2021   extubation trial today  12/24/2021 extubated over cook catheter due to concern for laryngeal edema. Successfully extubated this am. Close monitoring.   12/25/2021 agitated  delirium overnight.   12/26/2021 increase cough assist frequency.   12/27: pulmonary toilette, prednisone, daughter updated at bedside, change diet to puree  12/28: resume home apixaban, rescan head tonight, continue pulmonary toilette, can probably step down in AM, melatonin at 22 hr  12/29/2021 CT head stable./ Pureed diet started. Stepping down to medicine.    Interval history  12/30  Increased delirium this am. Eliquis now resumed and CT head stable on elequis.  s/p neurosurgery f/u. No neurosurigcal intervention at this time. plan for CTH head before discharge. SLP recs Mechanical soft, Liquid Diet Level: Thin   urinary retention overrnight -straight cath x1 for PVR >350 cc  12/31  Transfer to hospital medicine. delirium improving . leucocytosis tending down to  17   P replaced. neurocritical care recs to continue vimpat x 3 months and f/u in epilepsy clinic. started on robitussin for cough     Review of Systems:   Pain scale:  Constitutional:  fever,  chills, headache, vision loss, hearing loss, weight loss, + Generalized weakness, falls, loss of smell, loss of taste, poor appetite,  sore throat , + immunocompromised state.   Respiratory:+  cough, shortness of breath.   Cardiovascular: chest pain, dizziness, palpitations, orthopnea, swelling of feet, syncope  Gastrointestinal: nausea, vomiting, abdominal pain, diarrhea, black stool,  beeding per rectum,  change in bowel habits + trouble swallowing  Genitourinary: hematuria, dysuria, urgency, frequency  Integument/Breast: rash,  pruritis  Hematologic/Lymphatic: easy bruising, lymphadenopathy  Musculoskeletal: arthralgias , myalgias, back pain, neck pain, knee pain  Neurological: + confusion, seizures, tremors, slurred speech  Behavioral/Psych:  depression, anxiety, auditory or visual hallucinations     OBJECTIVE:     Physical Exam:  Body mass index is 25.35 kg/m².    Constitutional: Appears well-developed and well-nourished.   Head: Normocephalic and  atraumatic.   Neck: Normal range of motion. Neck supple.   Cardiovascular: Normal heart rate.  Regular heart rhythm.  Pulmonary/Chest: Effort normal.   Abdominal: No distension.  No tenderness  Musculoskeletal: Normal range of motion. No edema.   Neurological: Alert and oriented to person, place, and time. power 4/5. able to move bilateral upper and lower extremities without limitation   Skin: Skin is warm and dry.   Psychiatric: Normal mood and affect. Behavior is normal.                  Vital Signs  Temp: 98.4 °F (36.9 °C) (12/31/21 1542)  Pulse: 100 (12/31/21 1900)  Resp: 17 (12/31/21 1542)  BP: 131/65 (12/31/21 1542)  SpO2: 97 % (12/31/21 1542)     24 Hour VS Range    Temp:  [97.2 °F (36.2 °C)-98.5 °F (36.9 °C)]   Pulse:  []   Resp:  [16-43]   BP: (105-155)/(56-82)   SpO2:  [97 %-100 %]     Intake/Output Summary (Last 24 hours) at 12/31/2021 1936  Last data filed at 12/31/2021 1200  Gross per 24 hour   Intake 600 ml   Output 500 ml   Net 100 ml         I/O This Shift:  No intake/output data recorded.    Wt Readings from Last 3 Encounters:   12/19/21 67 kg (147 lb 11.3 oz)       I have personally reviewed the vitals and recorded Intake/Output     Laboratory/Diagnostic Data:    CBC/Anemia Labs: Coags:    Recent Labs   Lab 12/27/21  0953 12/29/21  1124 12/31/21  0956   WBC 22.18* 19.62* 17.82*   HGB 9.7* 9.0* 9.3*   HCT 33.2* 29.7* 29.6*   * 147* 108*   MCV 97 95 93   RDW 17.2* 17.5* 17.5*    No results for input(s): PT, INR, APTT in the last 168 hours.     Chemistries: ABG:   Recent Labs   Lab 12/26/21  0028 12/29/21  1124 12/31/21  0956    142 140   K 3.7 3.7 3.6    113* 111*   CO2 21* 22* 21*   BUN 29* 23 16   CREATININE 0.7 0.7 0.7   CALCIUM 8.4* 8.3* 8.3*   PROT 4.8* 4.8* 4.8*   BILITOT 1.5* 1.3* 1.3*   ALKPHOS 57 62 69   ALT 30 28 29   AST 26 21 24   MG  --  1.7 1.6   PHOS  --  1.7* 2.2*    No results for input(s): PH, PCO2, PO2, HCO3, POCSATURATED, BE in the last 168 hours.     POCT  Glucose: HbA1c:    Recent Labs   Lab 12/28/21  2227 12/29/21  2139 12/30/21  2113 12/31/21  0813 12/31/21  1131 12/31/21  1653   POCTGLUCOSE 178* 127* 137* 84 127* 199*    Hemoglobin A1C   Date Value Ref Range Status   12/13/2021 5.3 4.0 - 5.6 % Final     Comment:     ADA Screening Guidelines:  5.7-6.4%  Consistent with prediabetes  >or=6.5%  Consistent with diabetes    High levels of fetal hemoglobin interfere with the HbA1C  assay. Heterozygous hemoglobin variants (HbS, HgC, etc)do  not significantly interfere with this assay.   However, presence of multiple variants may affect accuracy.          Cardiac Enzymes: Ejection Fractions:    No results for input(s): CPK, CPKMB, MB, TROPONINI in the last 72 hours. EF   Date Value Ref Range Status   12/13/2021 65 % Final          No results for input(s): COLORU, APPEARANCEUA, PHUR, SPECGRAV, PROTEINUA, GLUCUA, KETONESU, BILIRUBINUA, OCCULTUA, NITRITE, UROBILINOGEN, LEUKOCYTESUR, RBCUA, WBCUA, BACTERIA, SQUAMEPITHEL, HYALINECASTS in the last 48 hours.    Invalid input(s): WRIGHTSUR    Procalcitonin (ng/mL)   Date Value   12/22/2021 0.67 (H)   12/21/2021 0.59 (H)     No results found for: BNP  No results found for: CRP, SEDRATE  No results found for: DDIMER  No results found for: FERRITIN  No results found for: LDH  CPK (U/L)   Date Value   12/13/2021 123     No results found for this or any previous visit.  POC Rapid COVID (no units)   Date Value   12/13/2021 Negative       Microbiology labs for the last week  Microbiology Results (last 7 days)     Procedure Component Value Units Date/Time    Blood culture [[id number]] Collected: 12/21/21 1032    Order Status: Completed Specimen: Blood from Peripheral, Antecubital, Right Updated: 12/26/21 1212     Blood Culture, Routine No growth after 5 days.    Narrative:      Blood cultures x 2 different sites. 4 bottles total. Please  draw cultures before administering antibiotics.    Blood culture [[id number]] Collected: 12/21/21 1010     Order Status: Completed Specimen: Blood from Peripheral, Forearm, Right Updated: 12/26/21 1212     Blood Culture, Routine No growth after 5 days.    Narrative:      Blood cultures from 2 different sites. 4 bottles total.  Please draw before starting antibiotics.          Reviewed and noted in plan where applicable- Please see chart for full lab data.    Lines/Drains:  PICC Double Lumen 12/15/21 1616 right basilic (Active)   Verification by X-ray Yes 12/30/21 0305   Site Assessment No drainage;No redness;No swelling;No warmth 12/30/21 0305   Extremity Assessment Distal to IV No abnormal discoloration;No redness;No swelling;No warmth 12/30/21 0305   Line Securement Device Secured with sutureless device 12/30/21 0305   Dressing Type Biopatch in place;Central line dressing 12/30/21 0305   Dressing Status Clean;Dry;Intact 12/30/21 0305   Dressing Intervention Integrity maintained 12/30/21 0305   Date on Dressing 12/30/21 12/30/21 0305   Dressing Due to be Changed 12/06/21 12/30/21 0305   Left Lumen Patency/Care Flushed w/o difficulty 12/30/21 0305   Right Lumen Patency/Care Flushed w/o difficulty 12/30/21 0305   Current Insertion Depth (cm) 33 cm 12/29/21 1505   Current Exposed Catheter (cm) 0 cm 12/28/21 0705   Extremity Circumference (cm) 33 cm 12/15/21 1616   Waveform Normal 12/25/21 1505   Line Necessity Review Poor venous access 12/30/21 0305   Number of days: 14       Imaging  ECG Results          ECG 12 lead (Final result)  Result time 12/21/21 14:15:54    Final result by Interface, Lab In [facility] (12/21/21 14:15:54)             Narrative:    Test Reason : I63.9,    Vent. Rate : 126 BPM     Atrial Rate : 252 BPM     P-R Int : 000 ms          QRS Dur : 076 ms      QT Int : 330 ms       P-R-T Axes : 265 010 -53 degrees     QTc Int : 477 ms    Atrial flutter with 2:1 A-V conduction    Right bundle branch block  ST and T wave abnormality, consider inferior ischemia  Abnormal ECG  When compared with ECG of  13-DEC-2021 10:53,  Atrial flutter has replaced Sinus rhythm  ST now depressed in Lateral leads  T wave inversion now evident in Inferior leads  Confirmed by Mukesh Cunningham MD ([id number]) on 12/21/2021 2:15:46 PM    Referred By: VINAY   SELF           Confirmed By:Mukesh Cunningham MD                           EKG 12-lead (Final result)  Result time 12/13/21 15:34:33    Final result by Interface, Lab In [facility] (12/13/21 15:34:33)             Narrative:    Test Reason : S06.5X9A,    Vent. Rate : 100 BPM     Atrial Rate : 100 BPM     P-R Int : 150 ms          QRS Dur : 088 ms      QT Int : 358 ms       P-R-T Axes : 058 -34 045 degrees     QTc Int : 461 ms    Normal sinus rhythm  Left axis deviation  Abnormal ECG  No previous ECGs available  Confirmed by Jerod MICHAEL MD ([id number]) on 12/13/2021 3:34:24 PM    Referred By: VINAY   SELF           Confirmed By:Jerod MICHAEL MD                            Results for orders placed during the hospital encounter of 12/13/21    Echo    Interpretation Summary  · Technically difficult study  · The left ventricle is normal in size with concentric hypertrophy and normal systolic function. The estimated ejection fraction is 65%.  · Normal right ventricular size with normal right ventricular systolic function.  · Normal left ventricular diastolic function.  · Normal central venous pressure (3 mmHg).      CT Head Without Contrast  Narrative: EXAMINATION:  CT HEAD WITHOUT CONTRAST    CLINICAL HISTORY:  Stroke, follow up;    TECHNIQUE:  Low dose axial CT images obtained throughout the head without intravenous contrast. Sagittal and coronal reconstructions were performed.    COMPARISON:  CT head 12/27/2021    FINDINGS:  Right sided low-density subdural collection measuring 1.2 cm in maximal thickness with minimal extension along the right tentorial leaflet (coronal series 601, image 50).  It appears stable in size without evidence of hyperdense component to suggest acute hemorrhage.   Persistent mass effect resulting in regional sulcal effacement and minimal leftward midline shift measuring in the order of 3 mm at the level of the septum pellucidum, stable.    Patchy periventricular white matter hypoattenuation which is nonspecific, however likely related to chronic ischemic microvascular changes.  No new parenchymal hypoattenuation to suggest acute infarction.    Mass effect compresses the right lateral ventricle.  Asymmetrical prominence of the left temporal horn which could be related to generalized volume loss versus less likely ventricular entrapment and also stable.    Skull/extracranial contents (limited evaluation): No fracture.  Aerated secretions within the right sphenoid sinus.  The remaining paranasal sinuses and mastoid air cells are clear.    Soft tissue density filling the right external auditory canal a, likely represents impacted cerumen.  Bilateral ICAs calcific atherosclerosis.  Impression: Unchanged appearance of right sided subdural collection with associated mass effect and stable mild leftward midline shift.  No new extra-axial or parenchymal hemorrhage.    Mild right sphenoid sinus fluid.    Electronically signed by resident: Jesenia Euceda MD  Date:    12/28/2021  Time:    14:42    Electronically signed by: Carlos Bull  Date:    12/28/2021  Time:    15:03      Labs, Imaging, EKG and Diagnostic results from 12/31/2021 were reviewed.    Medications:  Medication list was reviewed and changes noted under Assessment/Plan.  No current facility-administered medications on file prior to encounter.     Current Outpatient Medications on File Prior to Encounter   Medication Sig Dispense Refill    amLODIPine (NORVASC) 10 MG tablet Take 10 mg by mouth once daily.      atorvastatin (LIPITOR) 40 MG tablet Take 40 mg by mouth once daily.      baclofen (LIORESAL) 10 MG tablet Take 10 mg by mouth 3 (three) times daily.      ELIQUIS 5 mg Tab Take 5 mg by mouth 2 (two) times daily.       furosemide (LASIX) 40 MG tablet Take 40 mg by mouth once daily.      gabapentin (NEURONTIN) 300 MG capsule Take 300 mg by mouth once daily.      hydrOXYchloroQUINE (PLAQUENIL) 200 mg tablet Take 200 mg by mouth 2 (two) times daily.      montelukast (SINGULAIR) 10 mg tablet Take 10 mg by mouth once daily.      mycophenolate (CELLCEPT) 500 mg Tab Take 500 mg by mouth 2 (two) times daily.      NURTEC 75 mg odt Take by mouth.      predniSONE (DELTASONE) 5 MG tablet Take 5 mg by mouth once daily.      sumatriptan (IMITREX) 50 MG tablet Take by mouth.       Scheduled Medications:  acetylcysteine 100 mg/ml (10%), 4 mL, Nebulization, QID WAKE  amLODIPine, 10 mg, Oral, Daily  apixaban, 5 mg, Oral, BID  atorvastatin, 40 mg, Oral, Daily  famotidine, 20 mg, Oral, BID  hydrOXYchloroQUINE, 200 mg, Oral, BID  lacosamide, 100 mg, Oral, Q12H  melatonin, 12 mg, Oral, Nightly  montelukast, 10 mg, Oral, Daily  mycophenolate mofetil, 500 mg, Oral, BID  potassium, sodium phosphates, 2 packet, Oral, QID (AC & HS)  predniSONE, 30 mg, Oral, Daily  QUEtiapine, 12.5 mg, Oral, QHS  senna-docusate 8.6-50 mg, 1 tablet, Oral, Daily  silodosin, 4 mg, Oral, Daily  sodium chloride 0.9%, 10 mL, Intravenous, Q6H  sodium chloride 3%, 4 mL, Nebulization, Q4H      PRN: acetaminophen, albuterol-ipratropium, bisacodyL, dextrose 50%, glucagon (human recombinant), guaiFENesin 100 mg/5 ml, insulin aspart U-100, labetalol, ondansetron, racepinephrine, Flushing PICC Protocol **AND** sodium chloride 0.9% **AND** sodium chloride 0.9%  Infusions:   Estimated Creatinine Clearance: 56.3 mL/min (based on SCr of 0.7 mg/dL).    ASSESSMENT/PLAN:   Overview:  Selma Lux is a 86 y.o. female with medical history significant for     Active Hospital Problems    Diagnosis  POA    *SDH (subdural hematoma) [S06.5X9A]-SDH with brain compression  -non surgical  -repeat imaging reviewed stable  - was on apixaban and reversed with PCC on admission  - apixaban resumed  per NSGY, repeat CT head stable  - Neurosurgery follow up in clinic with repeat scam, ordered.  12/30  Increased delirium this am. Eliquis now resumed and CT head stable on elequis.  s/p neurosurgery f/u. No neurosurigcal intervention at this time. plan for CTH head before discharge  12/31  Transfer to hospital medicine. delirium improving .neurocritical care recs to continue vimpat x 3 months and f/u in epilepsy clinic        Metabolic encephalopathy- secondary to above   Patient with acute delirium. Will avoid narcotics/benzos that are known to worsen condition.  PRN antipsychotics to limit behaviors of self harm. Monitor closely.  12/30   Improving  Continues to perseverate on the dates  Encourage sleep at night and awake during the day  Delirium precautions  Melatonin at night   Prn quetiapine  12/31 AAOx3,      Dysphagia - seconary to above  12/30 . SLP recs Mechanical soft, Liquid Diet Level: Thin       Urinary retention overrnight -straight cath x1 for PVR >350 cc       Yes    Cardiac arrest [I46.9] PEA arrest 12/20 with Respiratory arrest   Now post extubation    Anemia   Patient's with Normocytic anemia.. Hemoglobin stable. Etiology likely due to chronic disease .  Current CBC reviewed-    Recent Labs   Lab 12/27/21  0953 12/29/21  1124 12/31/21  0956   HGB 9.7* 9.0* 9.3*         Component Value Date/Time    MCV 93 12/31/2021 0956    RDW 17.5 (H) 12/31/2021 0956     Monitor CBC and transfuse if H/H drops below 7/21.   No    Twitching [R25.3]no acute issues   No    Stroke [I63.9]small aSDH and tSAH. s/p neurosurgery eval . as above   No    Hyponatremia [E87.1]resoved     Thrombocytopenia  Patient with thrombocytopenia   Recent Labs   Lab 12/27/21  0953 12/29/21  1124 12/31/21  0956   * 147* 108*   . Platelet counts uptrending.monitor  Yes    Age-related physical debility [R54]PT following . needs rehab   Yes    Leukocytosis [D72.829]     Patient with leucocytosis   Recent Labs   Lab  12/27/21  0953 12/29/21  1124 12/31/21  0956   WBC 22.18* 19.62* 17.82*     . Afebrile. BCX 2 12/21 NGTD , urine culture pending . likely secondary to steroids..  WBC counts stable.  sputum culture with E coli 12/21. completed 5 days of cefepime     Hypophosphatemia - Patient with phosphorus   Recent Labs   Lab 12/29/21  1124 12/31/21  0956   PHOS 1.7* 2.2*   . replaced. monitor  Yes    RA (rheumatoid arthritis) [M06.9]   --continue mycophenolate and hydroxychloroquine  --continue steroids  --will switch to prednisone as she was on at home, family requesting  --30mg so higher than home dose  Yes      Resolved Hospital Problems   No resolved problems to display.       Disposition- Rehab    Discharge Planning   LETICIA: 1/3/2022     Code Status: Full Code   Is the patient medically ready for discharge?: No    Reason for patient still in hospital (select all that apply): Treatment  Discharge Plan A: Rehab         DVT prophylaxis addressed with:  Eliquis.    [facility] Care    Level 3 [id number] Total visit time was 35 minutes or greater with greater than 50% of time spent in counseling and coordination of care. .    We discussed in detail the plan of care, the patient's response to treatment, the discharge plan.  Total time includes time spent reviewing the medical record, examining the patient, writing notes and communicating with other professionals.     Tim Castillo MD  Attending Staff Physician  [facility]  pager- [phone]  [facility] - [phone]

## 2022-01-02 PROBLEM — E87.1 HYPONATREMIA: Status: RESOLVED | Noted: 2021-12-13 | Resolved: 2022-01-02

## 2022-01-02 PROBLEM — I46.9 CARDIAC ARREST: Status: RESOLVED | Noted: 2021-12-21 | Resolved: 2022-01-02

## 2022-01-02 PROBLEM — R41.0 DELIRIUM: Status: RESOLVED | Noted: 2021-12-30 | Resolved: 2022-01-02

## 2022-01-02 LAB
ALBUMIN SERPL BCP-MCNC: 2.6 G/DL (ref 3.5–5.2)
ANION GAP SERPL CALC-SCNC: 8 MMOL/L (ref 8–16)
ANISOCYTOSIS BLD QL SMEAR: SLIGHT
BASOPHILS # BLD AUTO: 0.04 K/UL (ref 0–0.2)
BASOPHILS NFR BLD: 0.2 % (ref 0–1.9)
BUN SERPL-MCNC: 15 MG/DL (ref 8–23)
BURR CELLS BLD QL SMEAR: ABNORMAL
CALCIUM SERPL-MCNC: 8 MG/DL (ref 8.7–10.5)
CHLORIDE SERPL-SCNC: 109 MMOL/L (ref 95–110)
CO2 SERPL-SCNC: 23 MMOL/L (ref 23–29)
CREAT SERPL-MCNC: 0.6 MG/DL (ref 0.5–1.4)
DIFFERENTIAL METHOD: ABNORMAL
EOSINOPHIL # BLD AUTO: 0.2 K/UL (ref 0–0.5)
EOSINOPHIL NFR BLD: 0.9 % (ref 0–8)
ERYTHROCYTE [DISTWIDTH] IN BLOOD BY AUTOMATED COUNT: 17.8 % (ref 11.5–14.5)
EST. GFR  (AFRICAN AMERICAN): >60 ML/MIN/1.73 M^2
EST. GFR  (NON AFRICAN AMERICAN): >60 ML/MIN/1.73 M^2
GLUCOSE SERPL-MCNC: 70 MG/DL (ref 70–110)
HCT VFR BLD AUTO: 28.3 % (ref 37–48.5)
HGB BLD-MCNC: 8.7 G/DL (ref 12–16)
IMM GRANULOCYTES # BLD AUTO: 0.72 K/UL (ref 0–0.04)
IMM GRANULOCYTES NFR BLD AUTO: 4.2 % (ref 0–0.5)
LYMPHOCYTES # BLD AUTO: 2.1 K/UL (ref 1–4.8)
LYMPHOCYTES NFR BLD: 11.9 % (ref 18–48)
MCH RBC QN AUTO: 28.6 PG (ref 27–31)
MCHC RBC AUTO-ENTMCNC: 30.7 G/DL (ref 32–36)
MCV RBC AUTO: 93 FL (ref 82–98)
MONOCYTES # BLD AUTO: 3.1 K/UL (ref 0.3–1)
MONOCYTES NFR BLD: 18.2 % (ref 4–15)
NEUTROPHILS # BLD AUTO: 11.1 K/UL (ref 1.8–7.7)
NEUTROPHILS NFR BLD: 64.6 % (ref 38–73)
NRBC BLD-RTO: 0 /100 WBC
PHOSPHATE SERPL-MCNC: 2.4 MG/DL (ref 2.7–4.5)
PLATELET # BLD AUTO: 129 K/UL (ref 150–450)
PMV BLD AUTO: 10.7 FL (ref 9.2–12.9)
POCT GLUCOSE: 112 MG/DL (ref 70–110)
POCT GLUCOSE: 153 MG/DL (ref 70–110)
POCT GLUCOSE: 164 MG/DL (ref 70–110)
POCT GLUCOSE: 234 MG/DL (ref 70–110)
POIKILOCYTOSIS BLD QL SMEAR: SLIGHT
POLYCHROMASIA BLD QL SMEAR: ABNORMAL
POTASSIUM SERPL-SCNC: 3.1 MMOL/L (ref 3.5–5.1)
RBC # BLD AUTO: 3.04 M/UL (ref 4–5.4)
SODIUM SERPL-SCNC: 140 MMOL/L (ref 136–145)
WBC # BLD AUTO: 17.16 K/UL (ref 3.9–12.7)

## 2022-01-02 PROCEDURE — 25000242 PHARM REV CODE 250 ALT 637 W/ HCPCS: Performed by: PSYCHIATRY & NEUROLOGY

## 2022-01-02 PROCEDURE — 99232 PR SUBSEQUENT HOSPITAL CARE,LEVL II: ICD-10-PCS | Mod: ,,, | Performed by: HOSPITALIST

## 2022-01-02 PROCEDURE — 25000003 PHARM REV CODE 250: Performed by: NURSE PRACTITIONER

## 2022-01-02 PROCEDURE — 63600175 PHARM REV CODE 636 W HCPCS: Performed by: NURSE PRACTITIONER

## 2022-01-02 PROCEDURE — A4216 STERILE WATER/SALINE, 10 ML: HCPCS | Performed by: PSYCHIATRY & NEUROLOGY

## 2022-01-02 PROCEDURE — 25000242 PHARM REV CODE 250 ALT 637 W/ HCPCS: Performed by: HOSPITALIST

## 2022-01-02 PROCEDURE — 85025 COMPLETE CBC W/AUTO DIFF WBC: CPT | Performed by: HOSPITALIST

## 2022-01-02 PROCEDURE — 25000242 PHARM REV CODE 250 ALT 637 W/ HCPCS: Performed by: PHYSICIAN ASSISTANT

## 2022-01-02 PROCEDURE — 25000003 PHARM REV CODE 250: Performed by: PSYCHIATRY & NEUROLOGY

## 2022-01-02 PROCEDURE — 94640 AIRWAY INHALATION TREATMENT: CPT

## 2022-01-02 PROCEDURE — 25000003 PHARM REV CODE 250: Performed by: HOSPITALIST

## 2022-01-02 PROCEDURE — 94799 UNLISTED PULMONARY SVC/PX: CPT

## 2022-01-02 PROCEDURE — 80069 RENAL FUNCTION PANEL: CPT | Performed by: HOSPITALIST

## 2022-01-02 PROCEDURE — 63600175 PHARM REV CODE 636 W HCPCS: Performed by: HOSPITALIST

## 2022-01-02 PROCEDURE — 94761 N-INVAS EAR/PLS OXIMETRY MLT: CPT

## 2022-01-02 PROCEDURE — 27000646 HC AEROBIKA DEVICE

## 2022-01-02 PROCEDURE — 94664 DEMO&/EVAL PT USE INHALER: CPT

## 2022-01-02 PROCEDURE — 11000001 HC ACUTE MED/SURG PRIVATE ROOM

## 2022-01-02 PROCEDURE — 99900035 HC TECH TIME PER 15 MIN (STAT)

## 2022-01-02 PROCEDURE — 99232 SBSQ HOSP IP/OBS MODERATE 35: CPT | Mod: ,,, | Performed by: HOSPITALIST

## 2022-01-02 PROCEDURE — 94668 MNPJ CHEST WALL SBSQ: CPT

## 2022-01-02 RX ORDER — LIDOCAINE 50 MG/G
1 PATCH TOPICAL
Status: DISCONTINUED | OUTPATIENT
Start: 2022-01-02 | End: 2022-01-04 | Stop reason: HOSPADM

## 2022-01-02 RX ORDER — SODIUM,POTASSIUM PHOSPHATES 280-250MG
2 POWDER IN PACKET (EA) ORAL
Status: COMPLETED | OUTPATIENT
Start: 2022-01-02 | End: 2022-01-02

## 2022-01-02 RX ORDER — ACETAMINOPHEN 325 MG/1
650 TABLET ORAL EVERY 6 HOURS PRN
Qty: 60 TABLET | Refills: 0 | Status: CANCELLED | OUTPATIENT
Start: 2022-01-02

## 2022-01-02 RX ORDER — ACETAMINOPHEN 500 MG
500 TABLET ORAL EVERY 4 HOURS PRN
Status: DISCONTINUED | OUTPATIENT
Start: 2022-01-02 | End: 2022-01-04 | Stop reason: HOSPADM

## 2022-01-02 RX ORDER — SODIUM CHLORIDE FOR INHALATION 3 %
4 VIAL, NEBULIZER (ML) INHALATION
Status: DISCONTINUED | OUTPATIENT
Start: 2022-01-02 | End: 2022-01-04 | Stop reason: HOSPADM

## 2022-01-02 RX ORDER — SIMETHICONE 80 MG
1 TABLET,CHEWABLE ORAL 3 TIMES DAILY PRN
Status: DISCONTINUED | OUTPATIENT
Start: 2022-01-02 | End: 2022-01-04 | Stop reason: HOSPADM

## 2022-01-02 RX ADMIN — IPRATROPIUM BROMIDE AND ALBUTEROL SULFATE 3 ML: 2.5; .5 SOLUTION RESPIRATORY (INHALATION) at 05:01

## 2022-01-02 RX ADMIN — IPRATROPIUM BROMIDE AND ALBUTEROL SULFATE 3 ML: 2.5; .5 SOLUTION RESPIRATORY (INHALATION) at 07:01

## 2022-01-02 RX ADMIN — IPRATROPIUM BROMIDE AND ALBUTEROL SULFATE 3 ML: 2.5; .5 SOLUTION RESPIRATORY (INHALATION) at 12:01

## 2022-01-02 RX ADMIN — MYCOPHENOLATE MOFETIL 500 MG: 200 POWDER, FOR SUSPENSION ORAL at 09:01

## 2022-01-02 RX ADMIN — Medication 10 ML: at 06:01

## 2022-01-02 RX ADMIN — PREDNISONE 10 MG: 5 TABLET ORAL at 08:01

## 2022-01-02 RX ADMIN — POTASSIUM & SODIUM PHOSPHATES POWDER PACK 280-160-250 MG 2 PACKET: 280-160-250 PACK at 09:01

## 2022-01-02 RX ADMIN — MONTELUKAST 10 MG: 10 TABLET, FILM COATED ORAL at 08:01

## 2022-01-02 RX ADMIN — POTASSIUM BICARBONATE 20 MEQ: 391 TABLET, EFFERVESCENT ORAL at 11:01

## 2022-01-02 RX ADMIN — SIMETHICONE 80 MG: 80 TABLET, CHEWABLE ORAL at 08:01

## 2022-01-02 RX ADMIN — SODIUM CHLORIDE 30 MG/ML INHALATION SOLUTION 4 ML: 30 SOLUTION INHALANT at 07:01

## 2022-01-02 RX ADMIN — ACETYLCYSTEINE 4 ML: 100 INHALANT RESPIRATORY (INHALATION) at 12:01

## 2022-01-02 RX ADMIN — Medication 10 ML: at 12:01

## 2022-01-02 RX ADMIN — SENNOSIDES AND DOCUSATE SODIUM 1 TABLET: 50; 8.6 TABLET ORAL at 08:01

## 2022-01-02 RX ADMIN — FAMOTIDINE 20 MG: 20 TABLET ORAL at 09:01

## 2022-01-02 RX ADMIN — HYDROXYCHLOROQUINE SULFATE 300 MG: 200 TABLET, FILM COATED ORAL at 08:01

## 2022-01-02 RX ADMIN — SILODOSIN 4 MG: 4 CAPSULE ORAL at 08:01

## 2022-01-02 RX ADMIN — FAMOTIDINE 20 MG: 20 TABLET ORAL at 08:01

## 2022-01-02 RX ADMIN — APIXABAN 5 MG: 5 TABLET, FILM COATED ORAL at 08:01

## 2022-01-02 RX ADMIN — ACETAMINOPHEN 500 MG: 500 TABLET ORAL at 05:01

## 2022-01-02 RX ADMIN — ACETAMINOPHEN 500 MG: 500 TABLET ORAL at 11:01

## 2022-01-02 RX ADMIN — POTASSIUM & SODIUM PHOSPHATES POWDER PACK 280-160-250 MG 2 PACKET: 280-160-250 PACK at 10:01

## 2022-01-02 RX ADMIN — LIDOCAINE 5% 1 PATCH: 700 PATCH TOPICAL at 11:01

## 2022-01-02 RX ADMIN — ACETYLCYSTEINE 4 ML: 100 INHALANT RESPIRATORY (INHALATION) at 08:01

## 2022-01-02 RX ADMIN — ACETYLCYSTEINE 4 ML: 100 INHALANT RESPIRATORY (INHALATION) at 07:01

## 2022-01-02 RX ADMIN — ATORVASTATIN CALCIUM 40 MG: 20 TABLET, FILM COATED ORAL at 08:01

## 2022-01-02 RX ADMIN — CHOLECALCIFEROL TAB 25 MCG (1000 UNIT) 2000 UNITS: 25 TAB at 08:01

## 2022-01-02 RX ADMIN — MYCOPHENOLATE MOFETIL 500 MG: 200 POWDER, FOR SUSPENSION ORAL at 10:01

## 2022-01-02 RX ADMIN — SODIUM CHLORIDE 30 MG/ML INHALATION SOLUTION 4 ML: 30 SOLUTION INHALANT at 04:01

## 2022-01-02 RX ADMIN — POTASSIUM & SODIUM PHOSPHATES POWDER PACK 280-160-250 MG 2 PACKET: 280-160-250 PACK at 05:01

## 2022-01-02 RX ADMIN — INSULIN ASPART 2 UNITS: 100 INJECTION, SOLUTION INTRAVENOUS; SUBCUTANEOUS at 12:01

## 2022-01-02 RX ADMIN — SODIUM CHLORIDE 30 MG/ML INHALATION SOLUTION 4 ML: 30 SOLUTION INHALANT at 12:01

## 2022-01-02 RX ADMIN — APIXABAN 5 MG: 5 TABLET, FILM COATED ORAL at 09:01

## 2022-01-02 RX ADMIN — MELATONIN TAB 3 MG 12 MG: 3 TAB at 09:01

## 2022-01-02 RX ADMIN — INSULIN ASPART 4 UNITS: 100 INJECTION, SOLUTION INTRAVENOUS; SUBCUTANEOUS at 05:01

## 2022-01-02 RX ADMIN — POTASSIUM BICARBONATE 40 MEQ: 391 TABLET, EFFERVESCENT ORAL at 08:01

## 2022-01-02 RX ADMIN — AMLODIPINE BESYLATE 10 MG: 10 TABLET ORAL at 08:01

## 2022-01-02 RX ADMIN — ACETAMINOPHEN 650 MG: 325 TABLET ORAL at 06:01

## 2022-01-02 RX ADMIN — LACOSAMIDE 100 MG: 10 SOLUTION ORAL at 09:01

## 2022-01-02 RX ADMIN — LACOSAMIDE 100 MG: 10 SOLUTION ORAL at 08:01

## 2022-01-02 RX ADMIN — ACETYLCYSTEINE 4 ML: 100 INHALANT RESPIRATORY (INHALATION) at 04:01

## 2022-01-02 RX ADMIN — IPRATROPIUM BROMIDE AND ALBUTEROL SULFATE 3 ML: 2.5; .5 SOLUTION RESPIRATORY (INHALATION) at 04:01

## 2022-01-02 RX ADMIN — Medication 10 ML: at 11:01

## 2022-01-02 RX ADMIN — INSULIN ASPART 2 UNITS: 100 INJECTION, SOLUTION INTRAVENOUS; SUBCUTANEOUS at 08:01

## 2022-01-02 RX ADMIN — QUETIAPINE FUMARATE 12.5 MG: 25 TABLET, FILM COATED ORAL at 09:01

## 2022-01-02 RX ADMIN — CALCIUM CARBONATE (ANTACID) CHEW TAB 500 MG 1000 MG: 500 CHEW TAB at 08:01

## 2022-01-02 NOTE — PLAN OF CARE
[facility] Care Plan    POC reviewed with Selma Lux and daughter at 1930. Pt  and daughter verbalized understanding. Questions and concerns addressed. Daughter requested that pt not be disturbed from [id number], so pt may get some rest. No acute events overnight. Pt progressing toward goals. Will continue to monitor. See below and flowsheets for full assessment and VS info.       Neuro:  [place] Coma Scale  Best Eye Response: 4-->(E4) spontaneous  Best Motor Response: 6-->(M6) obeys commands  Best Verbal Response: 5-->(V5) oriented  [place] Coma Scale Score: 15  Assessment Qualifiers: patient not sedated/intubated  Pupil PERRLA: yes     24hr Temp:  [97.8 °F (36.6 °C)-98.7 °F (37.1 °C)]     CV:   Rhythm: normal sinus rhythm  BP goals:   SBP < 160  MAP > 65    Resp:   O2 Device (Oxygen Therapy): room air  Vent Mode: Spont  Set Rate: 8 BPM  Oxygen Concentration (%): 21  Vt Set: 450 mL  PEEP/CPAP: 5 cmH20  Pressure Support: 10 cmH20    Plan: N/A    GI/:  SCOOBY Total Score: 20  Diet/Nutrition Received: mechanical/dental soft  Last Bowel Movement: 01/01/22  Voiding Characteristics: external catheter  No intake or output data in the 24 hours ending 01/02/22 0631  Unmeasured Output  Urine Occurrence: 1  Stool Occurrence: 0  Pad Count: 2    Labs/Accuchecks:  Recent Labs   Lab 01/02/22  0456   WBC 17.16*   RBC 3.04*   HGB 8.7*   HCT 28.3*   *      Recent Labs   Lab 12/31/21  0956 01/01/22  0837 01/02/22  0456      < > 140   K 3.6   < > 3.1*   CO2 21*   < > 23   *   < > 109   BUN 16   < > 15   CREATININE 0.7   < > 0.6   ALKPHOS 69  --   --    ALT 29  --   --    AST 24  --   --    BILITOT 1.3*  --   --     < > = values in this interval not displayed.    No results for input(s): PROTIME, INR, APTT, HEPANTIXA in the last 168 hours. No results for input(s): CPK, CPKMB, TROPONINI, MB in the last 168 hours.    Electrolytes: No replacement orders  Accuchecks: ACHS    Gtts:       LDA/Wounds:  Lines/Drains/Airways        Peripherally Inserted Central Catheter Line              PICC Double Lumen 12/15/21 1616 right basilic 17 days              Drain              Female External Urinary Catheter 12/31/21 0515 2 days                  Wounds: No  Wound care consulted: No   Problem: Adult Inpatient Plan of Care  Goal: Plan of Care Review  Outcome: Ongoing, Progressing  Goal: Patient-Specific Goal (Individualized)  Description: Admit Date:     Admit Dx:    History reviewed. No pertinent past medical history.    History reviewed. No pertinent surgical history.    Individualization:   1.     Restraints: (date/time/why or N/A)         Outcome: Ongoing, Progressing  Goal: Absence of Hospital-Acquired Illness or Injury  Outcome: Ongoing, Progressing  Goal: Optimal Comfort and Wellbeing  Outcome: Ongoing, Progressing  Goal: Readiness for Transition of Care  Outcome: Ongoing, Progressing     Problem: Infection  Goal: Absence of Infection Signs and Symptoms  Outcome: Ongoing, Progressing     Problem: Adjustment to Illness (Delirium)  Goal: Optimal Coping  Outcome: Ongoing, Progressing     Problem: Altered Behavior (Delirium)  Goal: Improved Behavioral Control  Outcome: Ongoing, Progressing     Problem: Attention and Thought Clarity Impairment (Delirium)  Goal: Improved Attention and Thought Clarity  Outcome: Ongoing, Progressing     Problem: Sleep Disturbance (Delirium)  Goal: Improved Sleep  Outcome: Ongoing, Progressing     Problem: Fall Injury Risk  Goal: Absence of Fall and Fall-Related Injury  Outcome: Ongoing, Progressing     Problem: Skin Injury Risk Increased  Goal: Skin Health and Integrity  Outcome: Ongoing, Progressing     Problem: Adjustment to Illness (Stroke, Hemorrhagic)  Goal: Optimal Coping  Outcome: Ongoing, Progressing     Problem: Bowel Elimination Impaired (Stroke, Hemorrhagic)  Goal: Effective Bowel Elimination  Outcome: Ongoing, Progressing     Problem: Cognitive Impairment (Stroke, Hemorrhagic)  Goal: Optimal Cognitive  Function  Outcome: Ongoing, Progressing     Problem: Communication Impairment (Stroke, Hemorrhagic)  Goal: Effective Communication Skills  Outcome: Ongoing, Progressing     Problem: Functional Ability Impaired (Stroke, Hemorrhagic)  Goal: Optimal Functional Ability  Outcome: Ongoing, Progressing     Problem: Pain (Stroke, Hemorrhagic)  Goal: Acceptable Pain Control  Outcome: Ongoing, Progressing

## 2022-01-02 NOTE — PLAN OF CARE
[facility]     Department of Hospital Medicine     [address] [place] [address]     [phone] after hours  [phone] fax                                        FACILITY TRANSFER ORDERS     Patient Name: Selma Lux  YOB: [birth year]    Admit to:  rehab     Diagnoses:  Active Hospital Problems    Diagnosis  POA    *SDH (subdural hematoma) [S06.5X9A]  Yes    Stroke [I63.9]  No    Age-related physical debility [R54]  Yes    Leukocytosis [D72.829]  Yes    RA (rheumatoid arthritis) [M06.9]  Yes      Resolved Hospital Problems    Diagnosis Date Resolved POA    Delirium [R41.0] 01/02/2022 No    Cardiac arrest [I46.9] 01/02/2022 No    Encephalopathy [G93.40] 01/02/2022 Yes    Twitching [R25.3] 01/02/2022 No    Hyponatremia [E87.1] 01/02/2022 Yes       Vital Signs: Routine.    Allergies:Review of patient's allergies indicates:  No Known Allergies      Diet:  Mechanical soft, Liquid Diet Level: Thin   Aspiration Precautions: 1 bite/sip at a time, Alternating bites/sips, Assistance with meals, Avoid talking while eating, Check for pocketing/oral residue, Eliminate distractions, Feed only when awake/alert, Frequent oral care, HOB to 90 degrees, Meds crushed in puree, Monitor for s/s of aspiration, Small bites/sips and Strict aspiration precautions     Acitivities: activity as tolerated    Nursing: per rehab protocol     Labs: BMP, CBC daily for 3 days    CONSULTS:       Physical Therapy to evaluate and treat     Occupational Therapy to evaluate and treat     Speech Therapy  to evaluate and treat    MISCELLANEOUS CARE      Routine Skin for Bedridden Patients:  Apply moisture barrier cream to all    skin folds and wet areas in perineal area daily and after baths and                           all bowel movements.      DIABETES CARE:  NA    Medications:        Medication List      Start taking these medications    acetaminophen 500 MG  tablet  Commonly known as: TYLENOL  Take 1 tablet (500 mg total) by mouth every 4 (four) hours as needed for Pain.     albuterol-ipratropium 2.5 mg-0.5 mg/3 mL nebulizer solution  Commonly known as: DUO-NEB  Take 3 mLs by nebulization every 4 (four) hours as needed for Wheezing. Rescue     bisacodyL 10 mg Supp  Commonly known as: DULCOLAX  Place 1 suppository (10 mg total) rectally daily as needed (constipation).     calcium carbonate 200 mg calcium (500 mg) chewable tablet  Commonly known as: TUMS  Take 2 tablets (1,000 mg total) by mouth once daily.     famotidine 20 MG tablet  Commonly known as: PEPCID  Take 1 tablet (20 mg total) by mouth 2 (two) times daily.     guaiFENesin 100 mg/5 ml 100 mg/5 mL syrup  Commonly known as: ROBITUSSIN  Take 10 mLs (200 mg total) by mouth every 4 (four) hours as needed for Congestion.     lacosamide 10 mg/mL Soln oral solution  Commonly known as: VIMPAT  Take 10 mLs (100 mg total) by mouth every 12 (twelve) hours.     LIDOcaine 5 %  Commonly known as: LIDODERM  Place 1 patch onto the skin once daily. Remove & Discard patch within 12 hours or as directed by MD     melatonin 12 mg Tab  Take 9 mg by mouth nightly.     mycophenolate mofetil 200 mg/mL Susr  Commonly known as: CELLCEPT  Take 2.5 mLs (500 mg total) by mouth 2 (two) times daily.  Replaces: mycophenolate 500 mg Tab     simethicone 80 MG chewable tablet  Commonly known as: MYLICON  Take 1 tablet (80 mg total) by mouth 3 (three) times daily as needed for Flatulence.     vitamin D 1000 units Tab  Commonly known as: VITAMIN D3  Take 2 tablets (2,000 Units total) by mouth once daily.        Change how you take these medications    hydrOXYchloroQUINE 300 mg Tab  Take 300 mg by mouth once daily.  What changed:   · medication strength  · how much to take  · when to take this     predniSONE 10 MG tablet  Commonly known as: DELTASONE  Take 1 tablet (10 mg total) by mouth once daily.  What changed:   · medication strength  · how  much to take        Continue taking these medications    amLODIPine 10 MG tablet  Commonly known as: NORVASC  Take 10 mg by mouth once daily.     atorvastatin 40 MG tablet  Commonly known as: LIPITOR  Take 40 mg by mouth once daily.     ELIQUIS 5 mg Tab  Generic drug: apixaban  Take 5 mg by mouth 2 (two) times daily.     montelukast 10 mg tablet  Commonly known as: SINGULAIR  Take 10 mg by mouth once daily.        Stop taking these medications    baclofen 10 MG tablet  Commonly known as: LIORESAL     furosemide 40 MG tablet  Commonly known as: LASIX     gabapentin 300 MG capsule  Commonly known as: NEURONTIN     mycophenolate 500 mg Tab  Commonly known as: CELLCEPT  Replaced by: mycophenolate mofetil 200 mg/mL Susr     NURTEC 75 mg odt  Generic drug: rimegepant     sumatriptan 50 MG tablet  Commonly known as: IMITREX              _________________________________  Tim Castillo MD  01/04/2022

## 2022-01-02 NOTE — PROGRESS NOTES
Progress Note  Hospital Medicine    Patient: Selma Lux [id number]  Date of Service: 1/2/2022  Team: [facility] HOSP MED B Tim Castillo MD  Length of Stay:  LOS: 20 days   Admission Date: 12/13/2021    SUBJECTIVE:     Follow-up For:  SDH (subdural hematoma)    HPI/Interval history (See H&P for complete P,F,SHx) :   The patient is an 86-year-old female with past medical history of TIA, RA and Afib of Eliquis admitted to [facility] with Right SDH.  She was found on the floor this morning only responding to painful stimuli.  Patient occasionally says her name.GCS 12 noted for EMS.  PCC administered. She had a mechanical fall out of bed on Thursday hitting her head and has been complaining of intermittent headaches since. Her last dose of eliquis was on 12/11. Pending repeat CT head. NSGY consulted. In addition patient was found to be hyponatremic at 118-central line placed and hypertonic saline infusion initiated. Pending repeat CT head. Patient admitted to [facility] for close monitoring and higher level of care.         Hospital Course: 12/13/2021: patient admitted to [facility] s/p fall on 12/11 on Eliquis  12/14/2021 wheezing, prolonged expiratory phase.   12/15/2021 copious respiratory secretions. Remains encephalopathic. Review EEG  12/16/2021 improved respiratory secretions. D/c nasal tracheal airway if minimal secretions.   12/17/2021 restless.   12/18/2021 lethargic, rising BUN/Cr ratio. Fluid resuscitation. Received flumazenil for BDZ   12/19/2021 encephalopathy overnight. Work up neg so far. BUN/Cr >20 from volume contraction.   12/20/2021 left facial twitching overnight. Loaded with vimpat.   12/21/2021 event overnight cardiac arrest/PEA likely related to respiratory distress. Intubated.   12/22/2021 Add precedex to enable vent weaning.  12/23/2021   extubation trial today  12/24/2021 extubated over cook catheter due to concern for laryngeal edema. Successfully extubated this am. Close monitoring.   12/25/2021 agitated delirium  overnight.   12/26/2021 increase cough assist frequency.   12/27: pulmonary toilette, prednisone, daughter updated at bedside, change diet to puree  12/28: resume home apixaban, rescan head tonight, continue pulmonary toilette, can probably step down in AM, melatonin at 22 hr  12/29/2021 CT head stable./ Pureed diet started. Stepping down to medicine.    Interval history  12/30  Increased delirium this am. Eliquis now resumed and CT head stable on elequis.  s/p neurosurgery f/u. No neurosurigcal intervention at this time. plan for CTH head before discharge. SLP recs Mechanical soft, Liquid Diet Level: Thin   urinary retention overrnight -straight cath x1 for PVR >350 cc  12/31  Transfer to hospital medicine. delirium improving . leucocytosis tending down to  17   P replaced. neurocritical care recs to continue vimpat x 3 months and f/u in epilepsy clinic. started on robitussin for cough   1/1 discussed with daughter Rosy Rosado [phone] . admitted with sodium 118. was on lasix 40mg daily. had chronic head ache and following Dr. Ac neurology . has another medical record number - [id number]. was on prednisone 10mg for , with prior history of intubation 2018 . Rheumatology consulted for RA/ - immunosuppressants - optimal dosing. Patient was started on the cellcept by Dr. Rouse due to concern for COOP after discussing it with pulm Dr. Francis in 2019. continue  cellcept 500mg BID. needs follow up outpatient with Dr. Rouse. hydroxychloroquine decreased to 300mg once daily. started on calcium and vitamin D. Urinary retention x2  overnight-  500ml each . Hill catheter placement .needs CTHead  prior to discharge.  1/2. K and P replaced . able to void without foleys catheter.  [facility]   started on lidocaine patch for neck pain    Review of Systems:   Pain scale:  Constitutional:  fever,  chills, headache, vision loss, hearing loss, weight loss, + Generalized weakness, falls, loss of  smell, loss of taste, poor appetite,  sore throat , + immunocompromised state.   Respiratory:+  cough, shortness of breath.   Cardiovascular: chest pain, dizziness, palpitations, orthopnea, swelling of feet, syncope  Gastrointestinal: nausea, vomiting, abdominal pain, diarrhea, black stool,  beeding per rectum,  change in bowel habits + trouble swallowing  Genitourinary: hematuria, dysuria, urgency, frequency  Integument/Breast: rash,  pruritis  Hematologic/Lymphatic: easy bruising, lymphadenopathy  Musculoskeletal: arthralgias , myalgias, back pain, neck pain, knee pain  Neurological: + confusion, seizures, tremors, slurred speech  Behavioral/Psych:  depression, anxiety, auditory or visual hallucinations     OBJECTIVE:     Physical Exam:  Body mass index is 25.35 kg/m².    Constitutional: Appears well-developed and well-nourished.   Head: Normocephalic and atraumatic.   Neck: Normal range of motion. Neck supple.   Cardiovascular: Normal heart rate.  Regular heart rhythm.  Pulmonary/Chest: Effort normal.   Abdominal: No distension.  No tenderness  Musculoskeletal: Normal range of motion. No edema.   Neurological: Alert and oriented to person, place, and time. power 4/5. able to move bilateral upper and lower extremities without limitation   Skin: Skin is warm and dry.   Psychiatric: Normal mood and affect. Behavior is normal.                  Vital Signs  Temp: 97.9 °F (36.6 °C) (01/02/22 0815)  Pulse: 106 (01/02/22 0815)  Resp: 17 (01/02/22 0815)  BP: 125/66 (01/02/22 0815)  SpO2: 100 % (01/02/22 0815)     24 Hour VS Range    Temp:  [97.8 °F (36.6 °C)-98.7 °F (37.1 °C)]   Pulse:  []   Resp:  [16-26]   BP: (106-146)/(52-79)   SpO2:  [97 %-100 %]   No intake or output data in the 24 hours ending 01/02/22 0911      I/O This Shift:  No intake/output data recorded.    Wt Readings from Last 3 Encounters:   12/19/21 67 kg (147 lb 11.3 oz)       I have personally reviewed the vitals and recorded Intake/Output      Laboratory/Diagnostic Data:    CBC/Anemia Labs: Coags:    Recent Labs   Lab 12/31/21  0956 01/01/22  0837 01/02/22  0456   WBC 17.82* 17.97* 17.16*   HGB 9.3* 9.5* 8.7*   HCT 29.6* 31.7* 28.3*   * 144* 129*   MCV 93 95 93   RDW 17.5* 17.9* 17.8*    No results for input(s): PT, INR, APTT in the last 168 hours.     Chemistries: ABG:   Recent Labs   Lab 12/29/21  1124 12/29/21  1124 12/31/21  0956 01/01/22  0837 01/02/22  0456      < > 140 139 140   K 3.7   < > 3.6 3.6 3.1*   *   < > 111* 110 109   CO2 22*   < > 21* 22* 23   BUN 23   < > 16 14 15   CREATININE 0.7   < > 0.7 0.6 0.6   CALCIUM 8.3*   < > 8.3* 8.0* 8.0*   PROT 4.8*  --  4.8*  --   --    BILITOT 1.3*  --  1.3*  --   --    ALKPHOS 62  --  69  --   --    ALT 28  --  29  --   --    AST 21  --  24  --   --    MG 1.7  --  1.6 1.6  --    PHOS 1.7*   < > 2.2* 2.2*  2.2* 2.4*    < > = values in this interval not displayed.    No results for input(s): PH, PCO2, PO2, HCO3, POCSATURATED, BE in the last 168 hours.     POCT Glucose: HbA1c:    Recent Labs   Lab 12/31/21  1653 01/01/22  0616 01/01/22  0807 01/01/22  1242 01/01/22  1737 01/02/22  0807   POCTGLUCOSE 199* 86 84 158* 182* 164*    Hemoglobin A1C   Date Value Ref Range Status   12/13/2021 5.3 4.0 - 5.6 % Final     Comment:     ADA Screening Guidelines:  5.7-6.4%  Consistent with prediabetes  >or=6.5%  Consistent with diabetes    High levels of fetal hemoglobin interfere with the HbA1C  assay. Heterozygous hemoglobin variants (HbS, HgC, etc)do  not significantly interfere with this assay.   However, presence of multiple variants may affect accuracy.          Cardiac Enzymes: Ejection Fractions:    No results for input(s): CPK, CPKMB, MB, TROPONINI in the last 72 hours. EF   Date Value Ref Range Status   12/13/2021 65 % Final          No results for input(s): COLORU, APPEARANCEUA, PHUR, SPECGRAV, PROTEINUA, GLUCUA, KETONESU, BILIRUBINUA, OCCULTUA, NITRITE, UROBILINOGEN, LEUKOCYTESUR,  RBCUA, WBCUA, BACTERIA, SQUAMEPITHEL, HYALINECASTS in the last 48 hours.    Invalid input(s): ALYCIASUR    Procalcitonin (ng/mL)   Date Value   12/22/2021 0.67 (H)   12/21/2021 0.59 (H)     No results found for: BNP  No results found for: CRP, SEDRATE  No results found for: DDIMER  No results found for: FERRITIN  No results found for: LDH  CPK (U/L)   Date Value   12/13/2021 123     No results found for this or any previous visit.  POC Rapid COVID (no units)   Date Value   12/13/2021 Negative       Microbiology labs for the last week  Microbiology Results (last 7 days)     Procedure Component Value Units Date/Time    Blood culture [[id number]] Collected: 12/21/21 1032    Order Status: Completed Specimen: Blood from Peripheral, Antecubital, Right Updated: 12/26/21 1212     Blood Culture, Routine No growth after 5 days.    Narrative:      Blood cultures x 2 different sites. 4 bottles total. Please  draw cultures before administering antibiotics.    Blood culture [[id number]] Collected: 12/21/21 1010    Order Status: Completed Specimen: Blood from Peripheral, Forearm, Right Updated: 12/26/21 1212     Blood Culture, Routine No growth after 5 days.    Narrative:      Blood cultures from 2 different sites. 4 bottles total.  Please draw before starting antibiotics.          Reviewed and noted in plan where applicable- Please see chart for full lab data.    Lines/Drains:  PICC Double Lumen 12/15/21 1616 right basilic (Active)   Verification by X-ray Yes 12/30/21 0305   Site Assessment No drainage;No redness;No swelling;No warmth 12/30/21 0305   Extremity Assessment Distal to IV No abnormal discoloration;No redness;No swelling;No warmth 12/30/21 0305   Line Securement Device Secured with sutureless device 12/30/21 0305   Dressing Type Biopatch in place;Central line dressing 12/30/21 0305   Dressing Status Clean;Dry;Intact 12/30/21 0305   Dressing Intervention Integrity maintained 12/30/21 0305   Date on Dressing 12/30/21  12/30/21 0305   Dressing Due to be Changed 12/06/21 12/30/21 0305   Left Lumen Patency/Care Flushed w/o difficulty 12/30/21 0305   Right Lumen Patency/Care Flushed w/o difficulty 12/30/21 0305   Current Insertion Depth (cm) 33 cm 12/29/21 1505   Current Exposed Catheter (cm) 0 cm 12/28/21 0705   Extremity Circumference (cm) 33 cm 12/15/21 1616   Waveform Normal 12/25/21 1505   Line Necessity Review Poor venous access 12/30/21 0305   Number of days: 14       Imaging  ECG Results          ECG 12 lead (Final result)  Result time 12/21/21 14:15:54    Final result by Interface, Lab In [facility] (12/21/21 14:15:54)             Narrative:    Test Reason : I63.9,    Vent. Rate : 126 BPM     Atrial Rate : 252 BPM     P-R Int : 000 ms          QRS Dur : 076 ms      QT Int : 330 ms       P-R-T Axes : 265 010 -53 degrees     QTc Int : 477 ms    Atrial flutter with 2:1 A-V conduction    Right bundle branch block  ST and T wave abnormality, consider inferior ischemia  Abnormal ECG  When compared with ECG of 13-DEC-2021 10:53,  Atrial flutter has replaced Sinus rhythm  ST now depressed in Lateral leads  T wave inversion now evident in Inferior leads  Confirmed by Mukesh Cunningham MD ([id number]) on 12/21/2021 2:15:46 PM    Referred By: [email or web address]   SELF           Confirmed By:Mukesh Cunningham MD                           EKG 12-lead (Final result)  Result time 12/13/21 15:34:33    Final result by Interface, Lab In [facility] (12/13/21 15:34:33)             Narrative:    Test Reason : S06.5X9A,    Vent. Rate : 100 BPM     Atrial Rate : 100 BPM     P-R Int : 150 ms          QRS Dur : 088 ms      QT Int : 358 ms       P-R-T Axes : 058 -34 045 degrees     QTc Int : 461 ms    Normal sinus rhythm  Left axis deviation  Abnormal ECG  No previous ECGs available  Confirmed by Jerod MICHAEL MD ([id number]) on 12/13/2021 3:34:24 PM    Referred By: [email or web address]   SELF           Confirmed By:Jerod MICHAEL MD                            Results for orders placed during the  hospital encounter of 12/13/21    Echo    Interpretation Summary  · Technically difficult study  · The left ventricle is normal in size with concentric hypertrophy and normal systolic function. The estimated ejection fraction is 65%.  · Normal right ventricular size with normal right ventricular systolic function.  · Normal left ventricular diastolic function.  · Normal central venous pressure (3 mmHg).      CT Head Without Contrast  Narrative: EXAMINATION:  CT HEAD WITHOUT CONTRAST    CLINICAL HISTORY:  Stroke, follow up;    TECHNIQUE:  Low dose axial CT images obtained throughout the head without intravenous contrast. Sagittal and coronal reconstructions were performed.    COMPARISON:  CT head 12/27/2021    FINDINGS:  Right sided low-density subdural collection measuring 1.2 cm in maximal thickness with minimal extension along the right tentorial leaflet (coronal series 601, image 50).  It appears stable in size without evidence of hyperdense component to suggest acute hemorrhage.  Persistent mass effect resulting in regional sulcal effacement and minimal leftward midline shift measuring in the order of 3 mm at the level of the septum pellucidum, stable.    Patchy periventricular white matter hypoattenuation which is nonspecific, however likely related to chronic ischemic microvascular changes.  No new parenchymal hypoattenuation to suggest acute infarction.    Mass effect compresses the right lateral ventricle.  Asymmetrical prominence of the left temporal horn which could be related to generalized volume loss versus less likely ventricular entrapment and also stable.    Skull/extracranial contents (limited evaluation): No fracture.  Aerated secretions within the right sphenoid sinus.  The remaining paranasal sinuses and mastoid air cells are clear.    Soft tissue density filling the right external auditory canal a, likely represents impacted cerumen.  Bilateral ICAs calcific atherosclerosis.  Impression:  Unchanged appearance of right sided subdural collection with associated mass effect and stable mild leftward midline shift.  No new extra-axial or parenchymal hemorrhage.    Mild right sphenoid sinus fluid.    Electronically signed by resident: Jesenia Euceda MD  Date:    12/28/2021  Time:    14:42    Electronically signed by: Carlos Bull  Date:    12/28/2021  Time:    15:03      Labs, Imaging, EKG and Diagnostic results from 1/2/2022 were reviewed.    Medications:  Medication list was reviewed and changes noted under Assessment/Plan.  No current facility-administered medications on file prior to encounter.     Current Outpatient Medications on File Prior to Encounter   Medication Sig Dispense Refill    amLODIPine (NORVASC) 10 MG tablet Take 10 mg by mouth once daily.      atorvastatin (LIPITOR) 40 MG tablet Take 40 mg by mouth once daily.      baclofen (LIORESAL) 10 MG tablet Take 10 mg by mouth 3 (three) times daily.      ELIQUIS 5 mg Tab Take 5 mg by mouth 2 (two) times daily.      furosemide (LASIX) 40 MG tablet Take 40 mg by mouth once daily.      gabapentin (NEURONTIN) 300 MG capsule Take 300 mg by mouth once daily.      hydrOXYchloroQUINE (PLAQUENIL) 200 mg tablet Take 200 mg by mouth 2 (two) times daily.      montelukast (SINGULAIR) 10 mg tablet Take 10 mg by mouth once daily.      mycophenolate (CELLCEPT) 500 mg Tab Take 500 mg by mouth 2 (two) times daily.      NURTEC 75 mg odt Take by mouth.      predniSONE (DELTASONE) 5 MG tablet Take 5 mg by mouth once daily.      sumatriptan (IMITREX) 50 MG tablet Take by mouth.       Scheduled Medications:  acetylcysteine 100 mg/ml (10%), 4 mL, Nebulization, QID WAKE  amLODIPine, 10 mg, Oral, Daily  apixaban, 5 mg, Oral, BID  atorvastatin, 40 mg, Oral, Daily  calcium carbonate, 1,000 mg, Oral, Daily  famotidine, 20 mg, Oral, BID  hydrOXYchloroQUINE, 300 mg, Oral, Daily  lacosamide, 100 mg, Oral, Q12H  melatonin, 12 mg, Oral, Nightly  montelukast, 10 mg,  Oral, Daily  mycophenolate mofetil, 500 mg, Oral, BID  potassium bicarbonate, 20 mEq, Oral, Once  potassium, sodium phosphates, 2 packet, Oral, QID (AC & HS)  predniSONE, 10 mg, Oral, Daily  QUEtiapine, 12.5 mg, Oral, QHS  senna-docusate 8.6-50 mg, 1 tablet, Oral, Daily  silodosin, 4 mg, Oral, Daily  sodium chloride 0.9%, 10 mL, Intravenous, Q6H  sodium chloride 3%, 4 mL, Nebulization, Q4H  vitamin D, 2,000 Units, Oral, Daily      PRN: acetaminophen, albuterol-ipratropium, bisacodyL, dextrose 50%, glucagon (human recombinant), guaiFENesin 100 mg/5 ml, insulin aspart U-100, labetalol, ondansetron, racepinephrine, simethicone, Flushing PICC Protocol **AND** sodium chloride 0.9% **AND** sodium chloride 0.9%  Infusions:   Estimated Creatinine Clearance: 65.7 mL/min (based on SCr of 0.6 mg/dL).    ASSESSMENT/PLAN:   Overview:  Selma Lux is a 86 y.o. female with medical history significant for     Active Hospital Problems    Diagnosis  POA    *SDH (subdural hematoma) [S06.5X9A]-SDH with brain compression  -non surgical  -repeat imaging reviewed stable  - was on apixaban and reversed with PCC on admission  - apixaban resumed per NSGY, repeat CT head stable  - Neurosurgery follow up in clinic with repeat scam, ordered.  12/30  Increased delirium this am. Eliquis now resumed and CT head stable on elequis.  s/p neurosurgery f/u. No neurosurigcal intervention at this time. plan for CTH head before discharge  12/31  Transfer to hospital medicine. delirium improving .neurocritical care recs to continue vimpat x 3 months and f/u in epilepsy clinic  needs CTHead  prior to discharge.      Metabolic encephalopathy- secondary to above   Patient with acute delirium. Will avoid narcotics/benzos that are known to worsen condition.  PRN antipsychotics to limit behaviors of self harm. Monitor closely.  12/30   Improving  Continues to perseverate on the dates  Encourage sleep at night and awake during the day  Delirium  precautions  Melatonin at night   Prn quetiapine  12/31 AAOx3,      Dysphagia - seconary to above  12/30 . SLP recs Mechanical soft, Liquid Diet Level: Thin       Urinary retention overrnight -1/1 . Urinary retention x2  overnight-  500ml each  able to void without foleys catheter.        Yes    Cardiac arrest [I46.9] PEA arrest 12/20 with Respiratory arrest   Now post extubation    Anemia   Patient's with Normocytic anemia.. Hemoglobin stable. Etiology likely due to chronic disease .  Current CBC reviewed-    Recent Labs   Lab 12/31/21  0956 01/01/22  0837 01/02/22  0456   HGB 9.3* 9.5* 8.7*         Component Value Date/Time    MCV 93 01/02/2022 0456    RDW 17.8 (H) 01/02/2022 0456     Monitor CBC and transfuse if H/H drops below 7/21.   No    Twitching [R25.3]no acute issues     Hypokalemia - Patient with potassium   Recent Labs   Lab 12/31/21  0956 01/01/22  0837 01/02/22  0456   K 3.6 3.6 3.1*   . replaced. monitor  No    Stroke [I63.9]small aSDH and tSAH. s/p neurosurgery eval . as above   No    Hyponatremia [E87.1]resoved     Thrombocytopenia  Patient with thrombocytopenia   Recent Labs   Lab 12/31/21  0956 01/01/22  0837 01/02/22  0456   * 144* 129*   . Platelet counts uptrending.monitor  Yes    Age-related physical debility [R54]PT following . needs rehab   Yes    Leukocytosis [D72.829]     Patient with leucocytosis   Recent Labs   Lab 12/31/21  0956 01/01/22  0837 01/02/22  0456   WBC 17.82* 17.97* 17.16*     . Afebrile. BCX 2 12/21 NGTD , urine culture pending . likely secondary to steroids..  WBC counts stable.  sputum culture with E coli 12/21. completed 5 days of cefepime     Hypophosphatemia - Patient with phosphorus   Recent Labs   Lab 12/31/21  0956 01/01/22  0837 01/02/22  0456   PHOS 2.2* 2.2*  2.2* 2.4*   . replaced. monitor  Yes    RA (rheumatoid arthritis) [M06.9]   --continue mycophenolate and hydroxychloroquine  --continue steroids  --will switch to prednisone as she was on at  home, family requesting  --30mg so higher than home dose  1/1 discussed with daughter Rosy Rosado [phone] . admitted with sodium 118. was on lasix 40mg daily. had chronic head ache and following Dr. Ac neurology . has another medical record number - [id number]. was on prednisone 10mg for COOP, with prior history of intubation 2018 Rheumatology consulted for RA/ - immunosuppressants - optimal dosing. Patient was started on the cellcept by Dr. Rouse due to concern for COOP after discussing it with pulm Dr. Francis in 2019. continue  cellcept 500mg BID. needs follow up outpatient with Dr. Rouse. hydroxychloroquine decreased to 300mg once daily. started on calcium and vitamin D.  needs DEXA scan outpatient  Yes      Resolved Hospital Problems   No resolved problems to display.       Disposition- Rehab    Discharge Planning   LETICIA: 1/4/2022     Code Status: Full Code   Is the patient medically ready for discharge?: No    Reason for patient still in hospital (select all that apply): Treatment  Discharge Plan A: Rehab         DVT prophylaxis addressed with:  Eliquis.          [facility] Care   Level 2 [id number] Total visit time was 25 minutes or greater with greater than 50% of time spent in counseling and coordination of care.     We discussed in detail the plan of care, the patient's response to treatment, the discharge plan. Total time includes time spent reviewing the medical record, examining the patient, writing notes and communicating with other professionals.    Tim Castillo MD  Attending Staff Physician  [facility] Medicine  pager- [phone]  [facility] - [phone]

## 2022-01-03 LAB
ACANTHOCYTES BLD QL SMEAR: PRESENT
ALBUMIN SERPL BCP-MCNC: 2.6 G/DL (ref 3.5–5.2)
ANION GAP SERPL CALC-SCNC: 6 MMOL/L (ref 8–16)
ANISOCYTOSIS BLD QL SMEAR: SLIGHT
BASOPHILS # BLD AUTO: 0.03 K/UL (ref 0–0.2)
BASOPHILS NFR BLD: 0.1 % (ref 0–1.9)
BUN SERPL-MCNC: 11 MG/DL (ref 8–23)
CALCIUM SERPL-MCNC: 7.8 MG/DL (ref 8.7–10.5)
CHLORIDE SERPL-SCNC: 103 MMOL/L (ref 95–110)
CO2 SERPL-SCNC: 24 MMOL/L (ref 23–29)
CREAT SERPL-MCNC: 0.6 MG/DL (ref 0.5–1.4)
DIFFERENTIAL METHOD: ABNORMAL
EOSINOPHIL # BLD AUTO: 0.2 K/UL (ref 0–0.5)
EOSINOPHIL NFR BLD: 0.9 % (ref 0–8)
ERYTHROCYTE [DISTWIDTH] IN BLOOD BY AUTOMATED COUNT: 17.5 % (ref 11.5–14.5)
EST. GFR  (AFRICAN AMERICAN): >60 ML/MIN/1.73 M^2
EST. GFR  (NON AFRICAN AMERICAN): >60 ML/MIN/1.73 M^2
GLUCOSE SERPL-MCNC: 103 MG/DL (ref 70–110)
HCT VFR BLD AUTO: 29.9 % (ref 37–48.5)
HGB BLD-MCNC: 9 G/DL (ref 12–16)
IMM GRANULOCYTES # BLD AUTO: 0.61 K/UL (ref 0–0.04)
IMM GRANULOCYTES NFR BLD AUTO: 2.9 % (ref 0–0.5)
LYMPHOCYTES # BLD AUTO: 1.5 K/UL (ref 1–4.8)
LYMPHOCYTES NFR BLD: 7.3 % (ref 18–48)
MAGNESIUM SERPL-MCNC: 1.4 MG/DL (ref 1.6–2.6)
MCH RBC QN AUTO: 28 PG (ref 27–31)
MCHC RBC AUTO-ENTMCNC: 30.1 G/DL (ref 32–36)
MCV RBC AUTO: 93 FL (ref 82–98)
MONOCYTES # BLD AUTO: 4 K/UL (ref 0.3–1)
MONOCYTES NFR BLD: 19.3 % (ref 4–15)
NEUTROPHILS # BLD AUTO: 14.4 K/UL (ref 1.8–7.7)
NEUTROPHILS NFR BLD: 69.5 % (ref 38–73)
NRBC BLD-RTO: 0 /100 WBC
OVALOCYTES BLD QL SMEAR: ABNORMAL
PHOSPHATE SERPL-MCNC: 2.2 MG/DL (ref 2.7–4.5)
PHOSPHATE SERPL-MCNC: 2.2 MG/DL (ref 2.7–4.5)
PLATELET # BLD AUTO: 160 K/UL (ref 150–450)
PLATELET BLD QL SMEAR: ABNORMAL
PMV BLD AUTO: 11.1 FL (ref 9.2–12.9)
POCT GLUCOSE: 128 MG/DL (ref 70–110)
POCT GLUCOSE: 130 MG/DL (ref 70–110)
POCT GLUCOSE: 86 MG/DL (ref 70–110)
POIKILOCYTOSIS BLD QL SMEAR: SLIGHT
POTASSIUM SERPL-SCNC: 4 MMOL/L (ref 3.5–5.1)
RBC # BLD AUTO: 3.21 M/UL (ref 4–5.4)
SCHISTOCYTES BLD QL SMEAR: PRESENT
SODIUM SERPL-SCNC: 133 MMOL/L (ref 136–145)
WBC # BLD AUTO: 20.76 K/UL (ref 3.9–12.7)

## 2022-01-03 PROCEDURE — 83735 ASSAY OF MAGNESIUM: CPT | Performed by: HOSPITALIST

## 2022-01-03 PROCEDURE — 25000003 PHARM REV CODE 250: Performed by: HOSPITALIST

## 2022-01-03 PROCEDURE — 25000003 PHARM REV CODE 250: Performed by: NURSE PRACTITIONER

## 2022-01-03 PROCEDURE — A4216 STERILE WATER/SALINE, 10 ML: HCPCS | Performed by: PSYCHIATRY & NEUROLOGY

## 2022-01-03 PROCEDURE — 99232 PR SUBSEQUENT HOSPITAL CARE,LEVL II: ICD-10-PCS | Mod: ,,, | Performed by: HOSPITALIST

## 2022-01-03 PROCEDURE — 25000003 PHARM REV CODE 250: Performed by: PSYCHIATRY & NEUROLOGY

## 2022-01-03 PROCEDURE — 99222 PR INITIAL HOSPITAL CARE,LEVL II: ICD-10-PCS | Mod: ,,, | Performed by: NURSE PRACTITIONER

## 2022-01-03 PROCEDURE — 92526 ORAL FUNCTION THERAPY: CPT

## 2022-01-03 PROCEDURE — 85025 COMPLETE CBC W/AUTO DIFF WBC: CPT | Performed by: HOSPITALIST

## 2022-01-03 PROCEDURE — 94761 N-INVAS EAR/PLS OXIMETRY MLT: CPT

## 2022-01-03 PROCEDURE — 25000242 PHARM REV CODE 250 ALT 637 W/ HCPCS: Performed by: PSYCHIATRY & NEUROLOGY

## 2022-01-03 PROCEDURE — 94668 MNPJ CHEST WALL SBSQ: CPT

## 2022-01-03 PROCEDURE — 94760 N-INVAS EAR/PLS OXIMETRY 1: CPT

## 2022-01-03 PROCEDURE — 63600175 PHARM REV CODE 636 W HCPCS: Performed by: HOSPITALIST

## 2022-01-03 PROCEDURE — 99222 1ST HOSP IP/OBS MODERATE 55: CPT | Mod: ,,, | Performed by: NURSE PRACTITIONER

## 2022-01-03 PROCEDURE — 25000242 PHARM REV CODE 250 ALT 637 W/ HCPCS: Performed by: PHYSICIAN ASSISTANT

## 2022-01-03 PROCEDURE — 63600175 PHARM REV CODE 636 W HCPCS: Performed by: NURSE PRACTITIONER

## 2022-01-03 PROCEDURE — 94640 AIRWAY INHALATION TREATMENT: CPT

## 2022-01-03 PROCEDURE — 80069 RENAL FUNCTION PANEL: CPT | Performed by: HOSPITALIST

## 2022-01-03 PROCEDURE — 99232 SBSQ HOSP IP/OBS MODERATE 35: CPT | Mod: ,,, | Performed by: HOSPITALIST

## 2022-01-03 PROCEDURE — 11000001 HC ACUTE MED/SURG PRIVATE ROOM

## 2022-01-03 PROCEDURE — 94664 DEMO&/EVAL PT USE INHALER: CPT

## 2022-01-03 PROCEDURE — 99900035 HC TECH TIME PER 15 MIN (STAT)

## 2022-01-03 PROCEDURE — 25000242 PHARM REV CODE 250 ALT 637 W/ HCPCS: Performed by: HOSPITALIST

## 2022-01-03 RX ORDER — MYCOPHENOLATE MOFETIL 200 MG/ML
500 POWDER, FOR SUSPENSION ORAL 2 TIMES DAILY
Qty: 150 ML | Refills: 11 | Status: SHIPPED | OUTPATIENT
Start: 2022-01-03 | End: 2022-06-10

## 2022-01-03 RX ORDER — ACETAMINOPHEN 500 MG
500 TABLET ORAL EVERY 4 HOURS PRN
Qty: 60 TABLET | Refills: 0 | Status: ON HOLD | OUTPATIENT
Start: 2022-01-03 | End: 2023-01-01 | Stop reason: SDUPTHER

## 2022-01-03 RX ORDER — LACOSAMIDE 10 MG/ML
100 SOLUTION ORAL EVERY 12 HOURS
Qty: 600 ML | Refills: 11 | Status: SHIPPED | OUTPATIENT
Start: 2022-01-03 | End: 2022-06-23

## 2022-01-03 RX ORDER — CHLORHEXIDINE GLUCONATE 4 %
9 LIQUID (ML) TOPICAL NIGHTLY
Qty: 30 TABLET | Refills: 2 | Status: SHIPPED | OUTPATIENT
Start: 2022-01-03 | End: 2022-10-12

## 2022-01-03 RX ORDER — CHOLECALCIFEROL (VITAMIN D3) 25 MCG
2000 TABLET ORAL DAILY
Qty: 30 TABLET | Refills: 2 | Status: ON HOLD | OUTPATIENT
Start: 2022-01-03 | End: 2022-10-13 | Stop reason: HOSPADM

## 2022-01-03 RX ORDER — MAGNESIUM SULFATE HEPTAHYDRATE 40 MG/ML
2 INJECTION, SOLUTION INTRAVENOUS ONCE
Status: COMPLETED | OUTPATIENT
Start: 2022-01-03 | End: 2022-01-03

## 2022-01-03 RX ORDER — FAMOTIDINE 20 MG/1
20 TABLET, FILM COATED ORAL 2 TIMES DAILY
Qty: 60 TABLET | Refills: 11 | Status: SHIPPED | OUTPATIENT
Start: 2022-01-03 | End: 2022-06-23

## 2022-01-03 RX ORDER — PREDNISONE 10 MG/1
10 TABLET ORAL DAILY
Qty: 30 TABLET | Refills: 2 | Status: SHIPPED | OUTPATIENT
Start: 2022-01-03 | End: 2023-01-30 | Stop reason: SDUPTHER

## 2022-01-03 RX ORDER — IPRATROPIUM BROMIDE AND ALBUTEROL SULFATE 2.5; .5 MG/3ML; MG/3ML
3 SOLUTION RESPIRATORY (INHALATION) EVERY 4 HOURS PRN
Qty: 75 ML | Refills: 0 | Status: ON HOLD | OUTPATIENT
Start: 2022-01-03 | End: 2022-06-23 | Stop reason: HOSPADM

## 2022-01-03 RX ORDER — CALCIUM CARBONATE 200(500)MG
1000 TABLET,CHEWABLE ORAL DAILY
Qty: 60 TABLET | Refills: 11 | Status: SHIPPED | OUTPATIENT
Start: 2022-01-03 | End: 2022-06-23

## 2022-01-03 RX ORDER — HYDROXYCHLOROQUINE SULFATE 300 MG/1
300 TABLET ORAL DAILY
Qty: 30 TABLET | Refills: 2 | Status: SHIPPED | OUTPATIENT
Start: 2022-01-03 | End: 2022-02-02

## 2022-01-03 RX ORDER — BISACODYL 10 MG
10 SUPPOSITORY, RECTAL RECTAL DAILY PRN
Qty: 30 SUPPOSITORY | Refills: 0 | Status: SHIPPED | OUTPATIENT
Start: 2022-01-03 | End: 2023-01-01

## 2022-01-03 RX ORDER — GUAIFENESIN 100 MG/5ML
200 SOLUTION ORAL EVERY 4 HOURS PRN
Qty: 236 ML | Refills: 0 | Status: SHIPPED | OUTPATIENT
Start: 2022-01-03 | End: 2022-01-13

## 2022-01-03 RX ORDER — SIMETHICONE 80 MG
80 TABLET,CHEWABLE ORAL 3 TIMES DAILY PRN
Qty: 30 TABLET | Refills: 0 | Status: SHIPPED | OUTPATIENT
Start: 2022-01-03 | End: 2022-10-12

## 2022-01-03 RX ORDER — SODIUM,POTASSIUM PHOSPHATES 280-250MG
2 POWDER IN PACKET (EA) ORAL
Status: COMPLETED | OUTPATIENT
Start: 2022-01-03 | End: 2022-01-04

## 2022-01-03 RX ORDER — LIDOCAINE 50 MG/G
1 PATCH TOPICAL DAILY
Qty: 30 PATCH | Refills: 2 | Status: ON HOLD | OUTPATIENT
Start: 2022-01-03 | End: 2023-01-01 | Stop reason: SDUPTHER

## 2022-01-03 RX ADMIN — ACETYLCYSTEINE 4 ML: 100 INHALANT RESPIRATORY (INHALATION) at 12:01

## 2022-01-03 RX ADMIN — CALCIUM CARBONATE (ANTACID) CHEW TAB 500 MG 1000 MG: 500 CHEW TAB at 09:01

## 2022-01-03 RX ADMIN — ACETYLCYSTEINE 4 ML: 100 INHALANT RESPIRATORY (INHALATION) at 05:01

## 2022-01-03 RX ADMIN — Medication 10 ML: at 11:01

## 2022-01-03 RX ADMIN — MONTELUKAST 10 MG: 10 TABLET, FILM COATED ORAL at 08:01

## 2022-01-03 RX ADMIN — SENNOSIDES AND DOCUSATE SODIUM 1 TABLET: 50; 8.6 TABLET ORAL at 08:01

## 2022-01-03 RX ADMIN — LACOSAMIDE 100 MG: 10 SOLUTION ORAL at 09:01

## 2022-01-03 RX ADMIN — FAMOTIDINE 20 MG: 20 TABLET ORAL at 09:01

## 2022-01-03 RX ADMIN — ACETYLCYSTEINE 4 ML: 100 INHALANT RESPIRATORY (INHALATION) at 07:01

## 2022-01-03 RX ADMIN — APIXABAN 5 MG: 5 TABLET, FILM COATED ORAL at 08:01

## 2022-01-03 RX ADMIN — SILODOSIN 4 MG: 4 CAPSULE ORAL at 09:01

## 2022-01-03 RX ADMIN — MAGNESIUM SULFATE IN WATER 2 G: 40 INJECTION, SOLUTION INTRAVENOUS at 02:01

## 2022-01-03 RX ADMIN — APIXABAN 5 MG: 5 TABLET, FILM COATED ORAL at 09:01

## 2022-01-03 RX ADMIN — MYCOPHENOLATE MOFETIL 500 MG: 200 POWDER, FOR SUSPENSION ORAL at 09:01

## 2022-01-03 RX ADMIN — SODIUM CHLORIDE 30 MG/ML INHALATION SOLUTION 4 ML: 30 SOLUTION INHALANT at 07:01

## 2022-01-03 RX ADMIN — QUETIAPINE FUMARATE 12.5 MG: 25 TABLET, FILM COATED ORAL at 09:01

## 2022-01-03 RX ADMIN — Medication 10 ML: at 06:01

## 2022-01-03 RX ADMIN — HYDROXYCHLOROQUINE SULFATE 300 MG: 200 TABLET, FILM COATED ORAL at 09:01

## 2022-01-03 RX ADMIN — CHOLECALCIFEROL TAB 25 MCG (1000 UNIT) 2000 UNITS: 25 TAB at 08:01

## 2022-01-03 RX ADMIN — IPRATROPIUM BROMIDE AND ALBUTEROL SULFATE 3 ML: 2.5; .5 SOLUTION RESPIRATORY (INHALATION) at 07:01

## 2022-01-03 RX ADMIN — LIDOCAINE 5% 1 PATCH: 700 PATCH TOPICAL at 11:01

## 2022-01-03 RX ADMIN — POTASSIUM & SODIUM PHOSPHATES POWDER PACK 280-160-250 MG 2 PACKET: 280-160-250 PACK at 05:01

## 2022-01-03 RX ADMIN — AMLODIPINE BESYLATE 10 MG: 10 TABLET ORAL at 09:01

## 2022-01-03 RX ADMIN — PREDNISONE 10 MG: 5 TABLET ORAL at 09:01

## 2022-01-03 RX ADMIN — ATORVASTATIN CALCIUM 40 MG: 20 TABLET, FILM COATED ORAL at 08:01

## 2022-01-03 RX ADMIN — SODIUM CHLORIDE 30 MG/ML INHALATION SOLUTION 4 ML: 30 SOLUTION INHALANT at 12:01

## 2022-01-03 RX ADMIN — Medication 10 ML: at 05:01

## 2022-01-03 RX ADMIN — LACOSAMIDE 100 MG: 10 SOLUTION ORAL at 10:01

## 2022-01-03 RX ADMIN — MYCOPHENOLATE MOFETIL 500 MG: 200 POWDER, FOR SUSPENSION ORAL at 10:01

## 2022-01-03 RX ADMIN — Medication 10 ML: at 12:01

## 2022-01-03 RX ADMIN — POTASSIUM & SODIUM PHOSPHATES POWDER PACK 280-160-250 MG 2 PACKET: 280-160-250 PACK at 09:01

## 2022-01-03 RX ADMIN — SODIUM CHLORIDE 30 MG/ML INHALATION SOLUTION 4 ML: 30 SOLUTION INHALANT at 05:01

## 2022-01-03 RX ADMIN — MELATONIN TAB 3 MG 12 MG: 3 TAB at 10:01

## 2022-01-03 NOTE — PT/OT/SLP PROGRESS
"Speech Language Pathology Treatment    Patient Name:  Selma Lux   MRN:  [id number]  Admitting Diagnosis: SDH (subdural hematoma)    Recommendations:                 General Recommendations:  Dysphagia therapy and Cognitive-linguistic therapy  Diet recommendations:  Mechanical soft, Thin   Aspiration Precautions: 1 bite/sip at a time, Alternating bites/sips, Assistance with meals, Avoid talking while eating, Check for pocketing/oral residue, Eliminate distractions, Feed only when awake/alert, Frequent oral care, HOB to 90 degrees, Meds crushed in puree, Monitor for s/s of aspiration, Small bites/sips and Strict aspiration precautions   General Precautions: Standard, aspiration,fall  Communication strategies:  go to room if call light pushed    Subjective     Pt found resting in bed with daughter at bedside upon SLP entry into room. Pt agreeable to participate in all aspects of session. She stated, "What would you like me to do?"    Pain/Comfort:  Pain Rating 1: 0/10  Pain Rating Post-Intervention 1: 0/10    Respiratory Status: Room air    Objective:     Has the patient been evaluated by SLP for swallowing?   Yes  Keep patient NPO? No   Current Respiratory Status:        Pt seen for ongoing dysphagia therapy. Daughter reported pt consumed minimal amounts of breakfast this AM but has not been exhibiting overt signs of aspiration including no coughing, throat clearing, or change in vocal quality. Pt awake but required ongoing MIN verbal and tactile cueing to maintain adequate levels of alertness for PO intake. She consumed trials of semi-solids (eryn cracker coated in pudding) x2 with increased mastication time and need for liquid wash following all trials to clear MIN oral residue. Additional PO trials deferred given pt's increasing lethargy despite ongoing multi-modality cueing. SLP provided education regarding continued recommendations for mechanical soft textures with thin liquids, overt s/s of aspiration, " overall impressions, and SLP POC. Pt verbalized understanding but would continue to benefit from ongoing education. Pt's daughter verbalized understanding and had no additional questions or concerns upon SLP exit.     Assessment:     Selma Lux is a 86 y.o. female with an SLP diagnosis of Dysphagia and Cognitive-Linguistic Impairment.      Goals:   Multidisciplinary Problems     SLP Goals        Problem: SLP Goal    Goal Priority Disciplines Outcome   SLP Goal     SLP Ongoing, Progressing   Description: Speech Language Pathology Goals  Goals expected to be met by 1/8:  1. Pt will participate in an ongoing assessment to determine the least restrictive and safest diet with possible updated goals to follow pending results.  2. Pt will participate in a speech, language, and cognitive evaluation with possible updated goals to follow pending results.  2. Pt will attend to therapy tasks for 5+ minutes given 1 redirection across 2 therapy sessions.   3. Pt will complete complex comprehension tasks with 90% accy given min cues.   4. Pt will name 10 items in a concrete category given min cues and no time constraints across 2 categories.   5. Pt will follow complex directions with 75% acc given 1 repetition.   6. Pt will answer temporal orientation questions with 90% acc no cues.                       Plan:     · Patient to be seen:  4 x/week   · Plan of Care expires:  01/14/22  · Plan of Care reviewed with:  patient,daughter   · SLP Follow-Up:  Yes       Discharge recommendations:  rehabilitation facility     Time Tracking:     SLP Treatment Date:   01/03/22  Speech Start Time:  1038  Speech Stop Time:  1048     Speech Total Time (min):  10 min    Billable Minutes: Treatment Swallowing Dysfunction 10       01/03/2022

## 2022-01-03 NOTE — SUBJECTIVE & OBJECTIVE
Past Medical History:   Diagnosis Date    Delirium 12/30/2021     History reviewed. No pertinent surgical history.  Review of patient's allergies indicates:  No Known Allergies    Scheduled Medications:    acetylcysteine 100 mg/ml (10%)  4 mL Nebulization QID WAKE    amLODIPine  10 mg Oral Daily    apixaban  5 mg Oral BID    atorvastatin  40 mg Oral Daily    calcium carbonate  1,000 mg Oral Daily    famotidine  20 mg Oral BID    hydrOXYchloroQUINE  300 mg Oral Daily    lacosamide  100 mg Oral Q12H    LIDOcaine  1 patch Transdermal Q24H    melatonin  12 mg Oral Nightly    montelukast  10 mg Oral Daily    mycophenolate mofetil  500 mg Oral BID    predniSONE  10 mg Oral Daily    QUEtiapine  12.5 mg Oral QHS    senna-docusate 8.6-50 mg  1 tablet Oral Daily    silodosin  4 mg Oral Daily    sodium chloride 0.9%  10 mL Intravenous Q6H    sodium chloride 3%  4 mL Nebulization Q4H WAKE    vitamin D  2,000 Units Oral Daily       PRN Medications: acetaminophen, albuterol-ipratropium, bisacodyL, dextrose 50%, glucagon (human recombinant), guaiFENesin 100 mg/5 ml, insulin aspart U-100, labetalol, ondansetron, racepinephrine, simethicone, Flushing PICC Protocol **AND** sodium chloride 0.9% **AND** sodium chloride 0.9%    Family History    None       Tobacco Use    Smoking status: Not on file    Smokeless tobacco: Not on file   Substance and Sexual Activity    Alcohol use: Not on file    Drug use: Not on file    Sexual activity: Not on file     Review of Systems   Constitutional: Positive for activity change. Negative for fatigue and fever.   HENT: Negative for sore throat.    Eyes: Negative for visual disturbance.   Respiratory: Negative for cough and shortness of breath.    Cardiovascular: Negative for chest pain and leg swelling.   Genitourinary: Negative for difficulty urinating.   Musculoskeletal: Positive for gait problem. Negative for back pain.   Neurological: Positive for weakness. Negative for  light-headedness and headaches.   Psychiatric/Behavioral: Positive for confusion. Negative for agitation.     Objective:     Vital Signs (Most Recent):  Temp: 98.1 °F (36.7 °C) (01/03/22 0800)  Pulse: 107 (01/03/22 0800)  Resp: 18 (01/03/22 0800)  BP: 131/62 (01/03/22 0800)  SpO2: 100 % (01/03/22 0800)    Vital Signs (24h Range):  Temp:  [97.1 °F (36.2 °C)-98.1 °F (36.7 °C)] 98.1 °F (36.7 °C)  Pulse:  [] 107  Resp:  [16-18] 18  SpO2:  [97 %-100 %] 100 %  BP: (119-137)/(62-65) 131/62     Body mass index is 25.35 kg/m².    Physical Exam  Vitals and nursing note reviewed.   Constitutional:       Appearance: Normal appearance.   HENT:      Mouth/Throat:      Mouth: Mucous membranes are moist.   Eyes:      Extraocular Movements: Extraocular movements intact.      Pupils: Pupils are equal, round, and reactive to light.   Musculoskeletal:      Comments: deconditioned and generalized weakness   Skin:     General: Skin is warm.   Neurological:      Mental Status: She is alert.      Comments: Falling back asleep during exam  Slow to answer questions   Following commands   Psychiatric:         Mood and Affect: Mood normal.       NEUROLOGICAL EXAMINATION:     CRANIAL NERVES     CN III, IV, VI   Pupils are equal, round, and reactive to light.      Diagnostic Results: Labs: Reviewed  ECG: Reviewed  CT: Reviewed

## 2022-01-03 NOTE — PLAN OF CARE
Problem: Adult Inpatient Plan of Care  Goal: Plan of Care Review  Outcome: Ongoing, Progressing     Problem: Sleep Disturbance (Delirium)  Goal: Improved Sleep  Outcome: Ongoing, Progressing     Problem: Bowel Elimination Impaired (Stroke, Hemorrhagic)  Goal: Effective Bowel Elimination  Outcome: Ongoing, Progressing       POC reviewed and updated with the patient/caregiver. Questions regarding POC were encouraged and addressed with the patient/caregiver.  VSS, see flow-sheets. Patient is AO X 4 at this time. Fall/safety precautions maintained, no signs of injury noted during shift. Patient was repositioned for comfort, bed locked in low position with side rails X 3, bed alarm set, with call light within reach. Patient instructed to call staff for mobility, verbalized understanding. No acute signs of distress noted at this time.      Pt had not voided throughout the day, bladder scan noted >550 mL. Provider notified. While attempting to straight cath to drain her bladder, the patient urinated spontaneously. WCTM and bladder scan Q6 if not voiding on her own.  Pt had a good BM today after complaining of stomach pain. Full relief obtained after.

## 2022-01-03 NOTE — HPI
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 Patient presented to [facility] on 12/13 for R SDH after being found down. Multiple follow up CTHs stable, last 12/27. Hospital course complicated by AMS and PEA arrest on 12/20 (was intubated now extubated and improved), delirium (improved, Epilepsy recommenidng Vimpat and f/u outpt), anemia (now stable likely 2/2 chronic disease per HM).     Functional History: Patient lives with daughter in a single story home with 5 steps to enter.  Prior to admission, needs assistance with adls. Caregiver at home for 3 hours M-F. DME: RW and [facility]PC.

## 2022-01-03 NOTE — CONSULTS
Inpatient consult to Physical Medicine Rehab  Consult performed by: Frances Vazquez NP  Consult ordered by: Tim Castillo MD  Reason for consult: Assess rehab needs      Reviewed patient history and current admission.  Rehab team following.  Full consult to follow.    MENDOZA Kruger, FNP-C  Physical Medicine & Rehabilitation   01/03/2022

## 2022-01-03 NOTE — PROGRESS NOTES
Progress Note  Hospital Medicine    Patient: Selma Lux [id number]  Date of Service: 1/3/2022  Team: [facility] [place] [place] HOSP MED B Tim Castillo MD  Length of Stay:  LOS: 21 days   Admission Date: 12/13/2021    SUBJECTIVE:     Follow-up For:  SDH (subdural hematoma)    HPI/Interval history (See H&P for complete P,F,SHx) :   The patient is an 86-year-old female with past medical history of TIA, RA and Afib of Eliquis admitted to [facility] with Right SDH.  She was found on the floor this morning only responding to painful stimuli.  Patient occasionally says her name.GCS 12 noted for EMS.  PCC administered. She had a mechanical fall out of bed on Thursday hitting her head and has been complaining of intermittent headaches since. Her last dose of eliquis was on 12/11. Pending repeat CT head. NSGY consulted. In addition patient was found to be hyponatremic at 118-central line placed and hypertonic saline infusion initiated. Pending repeat CT head. Patient admitted to [facility] for close monitoring and higher level of care.         Hospital Course: 12/13/2021: patient admitted to [facility] s/p fall on 12/11 on Eliquis  12/14/2021 wheezing, prolonged expiratory phase.   12/15/2021 copious respiratory secretions. Remains encephalopathic. Review EEG  12/16/2021 improved respiratory secretions. D/c nasal tracheal airway if minimal secretions.   12/17/2021 restless.   12/18/2021 lethargic, rising BUN/Cr ratio. Fluid resuscitation. Received flumazenil for BDZ   12/19/2021 encephalopathy overnight. Work up neg so far. BUN/Cr >20 from volume contraction.   12/20/2021 left facial twitching overnight. Loaded with vimpat.   12/21/2021 event overnight cardiac arrest/PEA likely related to respiratory distress. Intubated.   12/22/2021 Add precedex to enable vent weaning.  12/23/2021   extubation trial today  12/24/2021 extubated over cook catheter due to concern for laryngeal edema. Successfully extubated this am. Close monitoring.   12/25/2021 agitated delirium  overnight.   12/26/2021 increase cough assist frequency.   12/27: pulmonary toilette, prednisone, daughter updated at bedside, change diet to puree  12/28: resume home apixaban, rescan head tonight, continue pulmonary toilette, can probably step down in AM, melatonin at 22 hr  12/29/2021 CT head stable./ Pureed diet started. Stepping down to medicine.    Interval history  12/30  Increased delirium this am. Eliquis now resumed and CT head stable on elequis.  s/p neurosurgery f/u. No neurosurigcal intervention at this time. plan for CTH head before discharge. SLP recs Mechanical soft, Liquid Diet Level: Thin   urinary retention overrnight -straight cath x1 for PVR >350 cc  12/31  Transfer to hospital medicine. delirium improving . leucocytosis tending down to  17   P replaced. neurocritical care recs to continue vimpat x 3 months and f/u in epilepsy clinic. started on robitussin for cough   1/1 discussed with daughter Rosy Rosado [phone] . admitted with sodium 118. was on lasix 40mg daily. had chronic head ache and following Dr. Ac neurology . has another medical record number - [id number]. was on prednisone 10mg for , with prior history of intubation 2018 . Rheumatology consulted for RA/ - immunosuppressants - optimal dosing. Patient was started on the cellcept by Dr. Rouse due to concern for COOP after discussing it with pulm Dr. Francis in 2019. continue  cellcept 500mg BID. needs follow up outpatient with Dr. Rouse. hydroxychloroquine decreased to 300mg once daily. started on calcium and vitamin D. Urinary retention x2  overnight-  500ml each . Hill catheter placement .needs CTHead  prior to discharge.  1/2. K and P replaced . able to void without foleys catheter.  daugher prefers [facility]   started on lidocaine patch for neck pain  1/3 PMR eval today. 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  Stable 4 mm of leftward midline shift. No evidence of acute infarct or hemorrhage. likely discharge in AM    Review of Systems:   Pain scale:  Constitutional:  fever,  chills, headache, vision loss, hearing loss, weight loss, + Generalized weakness, falls, loss of smell, loss of taste, poor appetite,  sore throat , + immunocompromised state.   Respiratory:+  cough, shortness of breath.   Cardiovascular: chest pain, dizziness, palpitations, orthopnea, swelling of feet, syncope  Gastrointestinal: nausea, vomiting, abdominal pain, diarrhea, black stool,  beeding per rectum,  change in bowel habits + trouble swallowing  Genitourinary: hematuria, dysuria, urgency, frequency  Integument/Breast: rash,  pruritis  Hematologic/Lymphatic: easy bruising, lymphadenopathy  Musculoskeletal: arthralgias , myalgias, back pain, neck pain, knee pain  Neurological: + confusion, seizures, tremors, slurred speech  Behavioral/Psych:  depression, anxiety, auditory or visual hallucinations     OBJECTIVE:     Physical Exam:  Body mass index is 25.35 kg/m².    Constitutional: Appears well-developed and well-nourished.   Head: Normocephalic and atraumatic.   Neck: Normal range of motion. Neck supple.   Cardiovascular: Normal heart rate.  Regular heart rhythm.  Pulmonary/Chest: Effort normal.   Abdominal: No distension.  No tenderness  Musculoskeletal: Normal range of motion. No edema.   Neurological: Alert and oriented to person, place, and time. power 4/5. able to move bilateral upper and lower extremities without limitation   Skin: Skin is warm and dry.   Psychiatric: Normal mood and affect. Behavior is normal.                  Vital Signs  Temp: 97.1 °F (36.2 °C) (01/03/22 0430)  Pulse: 67 (01/03/22 0746)  Resp: 18 (01/03/22 0746)  BP: 134/64 (01/03/22 0430)  SpO2: 99 % (01/03/22 0746)     24 Hour VS Range    Temp:  [97.1 °F (36.2 °C)-97.9 °F (36.6 °C)]   Pulse:  []   Resp:  [16-18]   BP: (119-137)/(63-65)   SpO2:  [97  %-99 %]     Intake/Output Summary (Last 24 hours) at 1/3/2022 0826  Last data filed at 1/2/2022 1400  Gross per 24 hour   Intake 974 ml   Output no documentation   Net 974 ml         I/O This Shift:  No intake/output data recorded.    Wt Readings from Last 3 Encounters:   12/19/21 67 kg (147 lb 11.3 oz)       I have personally reviewed the vitals and recorded Intake/Output     Laboratory/Diagnostic Data:    CBC/Anemia Labs: Coags:    Recent Labs   Lab 12/31/21  0956 01/01/22  0837 01/02/22  0456   WBC 17.82* 17.97* 17.16*   HGB 9.3* 9.5* 8.7*   HCT 29.6* 31.7* 28.3*   * 144* 129*   MCV 93 95 93   RDW 17.5* 17.9* 17.8*    No results for input(s): PT, INR, APTT in the last 168 hours.     Chemistries: ABG:   Recent Labs   Lab 12/29/21  1124 12/29/21  1124 12/31/21  0956 01/01/22  0837 01/02/22  0456      < > 140 139 140   K 3.7   < > 3.6 3.6 3.1*   *   < > 111* 110 109   CO2 22*   < > 21* 22* 23   BUN 23   < > 16 14 15   CREATININE 0.7   < > 0.7 0.6 0.6   CALCIUM 8.3*   < > 8.3* 8.0* 8.0*   PROT 4.8*  --  4.8*  --   --    BILITOT 1.3*  --  1.3*  --   --    ALKPHOS 62  --  69  --   --    ALT 28  --  29  --   --    AST 21  --  24  --   --    MG 1.7  --  1.6 1.6  --    PHOS 1.7*   < > 2.2* 2.2*  2.2* 2.4*    < > = values in this interval not displayed.    No results for input(s): PH, PCO2, PO2, HCO3, POCSATURATED, BE in the last 168 hours.     POCT Glucose: HbA1c:    Recent Labs   Lab 01/01/22  1737 01/02/22  0807 01/02/22  1233 01/02/22  1719 01/02/22  2129 01/03/22  0724   POCTGLUCOSE 182* 164* 153* 234* 112* 86    Hemoglobin A1C   Date Value Ref Range Status   12/13/2021 5.3 4.0 - 5.6 % Final     Comment:     ADA Screening Guidelines:  5.7-6.4%  Consistent with prediabetes  >or=6.5%  Consistent with diabetes    High levels of fetal hemoglobin interfere with the HbA1C  assay. Heterozygous hemoglobin variants (HbS, HgC, etc)do  not significantly interfere with this assay.   However, presence of  multiple variants may affect accuracy.          Cardiac Enzymes: Ejection Fractions:    No results for input(s): CPK, CPKMB, MB, TROPONINI in the last 72 hours. EF   Date Value Ref Range Status   12/13/2021 65 % Final          No results for input(s): COLORU, APPEARANCEUA, PHUR, SPECGRAV, PROTEINUA, GLUCUA, KETONESU, BILIRUBINUA, OCCULTUA, NITRITE, UROBILINOGEN, LEUKOCYTESUR, RBCUA, WBCUA, BACTERIA, SQUAMEPITHEL, HYALINECASTS in the last 48 hours.    Invalid input(s): WRIGHTSUR    Procalcitonin (ng/mL)   Date Value   12/22/2021 0.67 (H)   12/21/2021 0.59 (H)     No results found for: BNP  No results found for: CRP, SEDRATE  No results found for: DDIMER  No results found for: FERRITIN  No results found for: LDH  CPK (U/L)   Date Value   12/13/2021 123     No results found for this or any previous visit.  POC Rapid COVID (no units)   Date Value   12/13/2021 Negative       Microbiology labs for the last week  Microbiology Results (last 7 days)     ** No results found for the last 168 hours. **          Reviewed and noted in plan where applicable- Please see chart for full lab data.    Lines/Drains:  PICC Double Lumen 12/15/21 1616 right basilic (Active)   Verification by X-ray Yes 12/30/21 0305   Site Assessment No drainage;No redness;No swelling;No warmth 12/30/21 0305   Extremity Assessment Distal to IV No abnormal discoloration;No redness;No swelling;No warmth 12/30/21 0305   Line Securement Device Secured with sutureless device 12/30/21 0305   Dressing Type Biopatch in place;Central line dressing 12/30/21 0305   Dressing Status Clean;Dry;Intact 12/30/21 0305   Dressing Intervention Integrity maintained 12/30/21 0305   Date on Dressing 12/30/21 12/30/21 0305   Dressing Due to be Changed 12/06/21 12/30/21 0305   Left Lumen Patency/Care Flushed w/o difficulty 12/30/21 0305   Right Lumen Patency/Care Flushed w/o difficulty 12/30/21 0305   Current Insertion Depth (cm) 33 cm 12/29/21 1505   Current Exposed Catheter (cm)  0 cm 12/28/21 0705   Extremity Circumference (cm) 33 cm 12/15/21 1616   Waveform Normal 12/25/21 1505   Line Necessity Review Poor venous access 12/30/21 0305   Number of days: 14       Imaging  ECG Results          ECG 12 lead (Final result)  Result time 12/21/21 14:15:54    Final result by Interface, Lab In [facility] (12/21/21 14:15:54)             Narrative:    Test Reason : I63.9,    Vent. Rate : 126 BPM     Atrial Rate : 252 BPM     P-R Int : 000 ms          QRS Dur : 076 ms      QT Int : 330 ms       P-R-T Axes : 265 010 -53 degrees     QTc Int : 477 ms    Atrial flutter with 2:1 A-V conduction    Right bundle branch block  ST and T wave abnormality, consider inferior ischemia  Abnormal ECG  When compared with ECG of 13-DEC-2021 10:53,  Atrial flutter has replaced Sinus rhythm  ST now depressed in Lateral leads  T wave inversion now evident in Inferior leads  Confirmed by Mukesh Cunningham MD ([id number]) on 12/21/2021 2:15:46 PM    Referred By: [email or web address]   SELF           Confirmed By:Mukesh Cunningham MD                           EKG 12-lead (Final result)  Result time 12/13/21 15:34:33    Final result by Interface, Lab In [facility] (12/13/21 15:34:33)             Narrative:    Test Reason : S06.5X9A,    Vent. Rate : 100 BPM     Atrial Rate : 100 BPM     P-R Int : 150 ms          QRS Dur : 088 ms      QT Int : 358 ms       P-R-T Axes : 058 -34 045 degrees     QTc Int : 461 ms    Normal sinus rhythm  Left axis deviation  Abnormal ECG  No previous ECGs available  Confirmed by Jerod MICHAEL MD ([id number]) on 12/13/2021 3:34:24 PM    Referred By: [email or web address]   SELF           Confirmed By:Jerod MICHAEL MD                            Results for orders placed during the hospital encounter of 12/13/21    Echo    Interpretation Summary  · Technically difficult study  · The left ventricle is normal in size with concentric hypertrophy and normal systolic function. The estimated ejection fraction is 65%.  · Normal right ventricular size with  normal right ventricular systolic function.  · Normal left ventricular diastolic function.  · Normal central venous pressure (3 mmHg).      CT Head Without Contrast  Narrative: EXAMINATION:  CT HEAD WITHOUT CONTRAST    CLINICAL HISTORY:  Stroke, follow up;    TECHNIQUE:  Low dose axial CT images obtained throughout the head without intravenous contrast. Sagittal and coronal reconstructions were performed.    COMPARISON:  CT head 12/27/2021    FINDINGS:  Right sided low-density subdural collection measuring 1.2 cm in maximal thickness with minimal extension along the right tentorial leaflet (coronal series 601, image 50).  It appears stable in size without evidence of hyperdense component to suggest acute hemorrhage.  Persistent mass effect resulting in regional sulcal effacement and minimal leftward midline shift measuring in the order of 3 mm at the level of the septum pellucidum, stable.    Patchy periventricular white matter hypoattenuation which is nonspecific, however likely related to chronic ischemic microvascular changes.  No new parenchymal hypoattenuation to suggest acute infarction.    Mass effect compresses the right lateral ventricle.  Asymmetrical prominence of the left temporal horn which could be related to generalized volume loss versus less likely ventricular entrapment and also stable.    Skull/extracranial contents (limited evaluation): No fracture.  Aerated secretions within the right sphenoid sinus.  The remaining paranasal sinuses and mastoid air cells are clear.    Soft tissue density filling the right external auditory canal a, likely represents impacted cerumen.  Bilateral ICAs calcific atherosclerosis.  Impression: Unchanged appearance of right sided subdural collection with associated mass effect and stable mild leftward midline shift.  No new extra-axial or parenchymal hemorrhage.    Mild right sphenoid sinus fluid.    Electronically signed by resident: Jesenia Euceda  MD  Date:    12/28/2021  Time:    14:42    Electronically signed by: Carlos Bull  Date:    12/28/2021  Time:    15:03      Labs, Imaging, EKG and Diagnostic results from 1/3/2022 were reviewed.    Medications:  Medication list was reviewed and changes noted under Assessment/Plan.  No current facility-administered medications on file prior to encounter.     Current Outpatient Medications on File Prior to Encounter   Medication Sig Dispense Refill    amLODIPine (NORVASC) 10 MG tablet Take 10 mg by mouth once daily.      atorvastatin (LIPITOR) 40 MG tablet Take 40 mg by mouth once daily.      baclofen (LIORESAL) 10 MG tablet Take 10 mg by mouth 3 (three) times daily.      ELIQUIS 5 mg Tab Take 5 mg by mouth 2 (two) times daily.      furosemide (LASIX) 40 MG tablet Take 40 mg by mouth once daily.      gabapentin (NEURONTIN) 300 MG capsule Take 300 mg by mouth once daily.      hydrOXYchloroQUINE (PLAQUENIL) 200 mg tablet Take 200 mg by mouth 2 (two) times daily.      montelukast (SINGULAIR) 10 mg tablet Take 10 mg by mouth once daily.      mycophenolate (CELLCEPT) 500 mg Tab Take 500 mg by mouth 2 (two) times daily.      NURTEC 75 mg odt Take by mouth.      predniSONE (DELTASONE) 5 MG tablet Take 5 mg by mouth once daily.      sumatriptan (IMITREX) 50 MG tablet Take by mouth.       Scheduled Medications:  acetylcysteine 100 mg/ml (10%), 4 mL, Nebulization, QID WAKE  amLODIPine, 10 mg, Oral, Daily  apixaban, 5 mg, Oral, BID  atorvastatin, 40 mg, Oral, Daily  calcium carbonate, 1,000 mg, Oral, Daily  famotidine, 20 mg, Oral, BID  hydrOXYchloroQUINE, 300 mg, Oral, Daily  lacosamide, 100 mg, Oral, Q12H  LIDOcaine, 1 patch, Transdermal, Q24H  melatonin, 12 mg, Oral, Nightly  montelukast, 10 mg, Oral, Daily  mycophenolate mofetil, 500 mg, Oral, BID  predniSONE, 10 mg, Oral, Daily  QUEtiapine, 12.5 mg, Oral, QHS  senna-docusate 8.6-50 mg, 1 tablet, Oral, Daily  silodosin, 4 mg, Oral, Daily  sodium chloride 0.9%,  10 mL, Intravenous, Q6H  sodium chloride 3%, 4 mL, Nebulization, Q4H WAKE  vitamin D, 2,000 Units, Oral, Daily      PRN: acetaminophen, albuterol-ipratropium, bisacodyL, dextrose 50%, glucagon (human recombinant), guaiFENesin 100 mg/5 ml, insulin aspart U-100, labetalol, ondansetron, racepinephrine, simethicone, Flushing PICC Protocol **AND** sodium chloride 0.9% **AND** sodium chloride 0.9%  Infusions:   Estimated Creatinine Clearance: 65.7 mL/min (based on SCr of 0.6 mg/dL).    ASSESSMENT/PLAN:   Overview:  Selma Lux is a 86 y.o. female with medical history significant for     Active Hospital Problems    Diagnosis  POA    *SDH (subdural hematoma) [S06.5X9A]-SDH with brain compression  -non surgical  -repeat imaging reviewed stable  - was on apixaban and reversed with PCC on admission  - apixaban resumed per NSGY, repeat CT head stable  - Neurosurgery follow up in clinic with repeat scam, ordered.  12/30  Increased delirium this am. Eliquis now resumed and CT head stable on elequis.  s/p neurosurgery f/u. No neurosurigcal intervention at this time. plan for CTH head before discharge  12/31  Transfer to hospital medicine. delirium improving .neurocritical care recs to continue vimpat x 3 months and f/u in epilepsy clinic  needs CTHead  prior to discharge.  1/3 .repeat CT head -Stable appearance of chronic right subdural hematoma with associated mass effect and regional sulcal effacement.  Stable 4 mm of leftward midline shift. No evidence of acute infarct or hemorrhage.      Metabolic encephalopathy- secondary to above   Patient with acute delirium. Will avoid narcotics/benzos that are known to worsen condition.  PRN antipsychotics to limit behaviors of self harm. Monitor closely.  12/30   Improving  Continues to perseverate on the dates  Encourage sleep at night and awake during the day  Delirium precautions  Melatonin at night   Prn quetiapine  12/31 AAOx3,      Dysphagia - seconary to above  12/30 . SLP recs  Mechanical soft, Liquid Diet Level: Thin       Urinary retention overrnight -1/1 . Urinary retention x2  overnight-  500ml each  able to void without foleys catheter.        Yes    Cardiac arrest [I46.9] PEA arrest 12/20 with Respiratory arrest   Now post extubation    Anemia   Patient's with Normocytic anemia.. Hemoglobin stable. Etiology likely due to chronic disease .  Current CBC reviewed-    Recent Labs   Lab 12/31/21  0956 01/01/22  0837 01/02/22  0456   HGB 9.3* 9.5* 8.7*         Component Value Date/Time    MCV 93 01/02/2022 0456    RDW 17.8 (H) 01/02/2022 0456     Monitor CBC and transfuse if H/H drops below 7/21.   No    Twitching [R25.3]Twitching [R25.3]EEG multiples times on 12/14, 12/15, 12/18, 12/19, 12/20, and 12/21- no electrographic seizures seen. Myoclonic flexion movements of left wrist and left shoulder shrug movements captured on EEG with no EEG correlate and are not electrographic seizures; suspect EPC . 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     no acute issues . continue vimpat     Hypokalemia - Patient with potassium   Recent Labs   Lab 12/31/21  0956 01/01/22  0837 01/02/22  0456   K 3.6 3.6 3.1*   . replaced. monitor  No    Stroke [I63.9]small aSDH and tSAH. s/p neurosurgery eval . as above   No    Hyponatremia [E87.1]resoved     Thrombocytopenia  Patient with thrombocytopenia   Recent Labs   Lab 12/31/21  0956 01/01/22  0837 01/02/22  0456   * 144* 129*   . Platelet counts uptrending.monitor  Yes    Age-related physical debility [R54]PT following . needs rehab   Yes    Leukocytosis [D72.829]     Patient with leucocytosis   Recent Labs   Lab 12/31/21  0956 01/01/22  0837 01/02/22  0456   WBC 17.82* 17.97* 17.16*     . Afebrile. BCX 2 12/21 NGTD , urine culture pending . likely secondary to steroids..  WBC counts stable.  sputum  culture with E coli 12/21. completed 5 days of cefepime     Hypophosphatemia - Patient with phosphorus   Recent Labs   Lab 12/31/21  0956 01/01/22  0837 01/02/22  0456   PHOS 2.2* 2.2*  2.2* 2.4*   . replaced. monitor  Yes    RA (rheumatoid arthritis) [M06.9]   --continue mycophenolate and hydroxychloroquine  --continue steroids  --will switch to prednisone as she was on at home, family requesting  --30mg so higher than home dose  1/1 discussed with daughter Rosy Rosado [phone] . admitted with sodium 118. was on lasix 40mg daily. had chronic head ache and following Dr. Ac neurology . has another medical record number - [id number]. was on prednisone 10mg for COOP, with prior history of intubation 2018 Rheumatology consulted for RA/ - immunosuppressants - optimal dosing. Patient was started on the cellcept by Dr. Rouse due to concern for COOP after discussing it with pulm Dr. Francis in 2019. continue  cellcept 500mg BID. needs follow up outpatient with Dr. Rouse. hydroxychloroquine decreased to 300mg once daily. started on calcium and vitamin D.  needs DEXA scan outpatient  Yes      Resolved Hospital Problems   No resolved problems to display.       Disposition- Rehab    Discharge Planning   LETICIA: 1/4/2022     Code Status: Full Code   Is the patient medically ready for discharge?: No    Reason for patient still in hospital (select all that apply): Treatment  Discharge Plan A: Rehab         DVT prophylaxis addressed with:  Eliquis.          [facility] [place] [facility] Care   Level 2 [id number] Total visit time was 25 minutes or greater with greater than 50% of time spent in counseling and coordination of care.     We discussed in detail the plan of care, the patient's response to treatment, the discharge plan. Total time includes time spent reviewing the medical record, examining the patient, writing notes and communicating with other professionals.    Tim Castillo MD  Attending Staff Physician  [facility]  Medicine  pager- [phone]  [facility] - [phone]

## 2022-01-03 NOTE — CONSULTS
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 Patient presented to [facility] on 12/13 for R SDH after being found down. Multiple follow up CTHs stable, last 12/27. Hospital course complicated by AMS and PEA arrest on 12/20 (was intubated now extubated and improved), delirium (improved, Epilepsy recommenidng Vimpat and f/u outpt), anemia (now stable likely 2/2 chronic disease per HM).     Functional History: Patient lives with daughter in a single story home with 5 steps to enter.  Prior to admission, needs assistance with adls. Caregiver at home for 3 hours M-F. DME: RW and SPC.       Hospital Course: 12/30/21:Participated w/ OT. Bed mob maxA- maxA x 2 ppl. Grooming maxA. LBD totalA.       Past Medical History:   Diagnosis Date    Delirium 12/30/2021     History reviewed. No pertinent surgical history.  Review of patient's allergies indicates:  No Known Allergies    Scheduled Medications:    acetylcysteine 100 mg/ml (10%)  4 mL Nebulization QID WAKE    amLODIPine  10 mg Oral Daily    apixaban  5 mg Oral BID    atorvastatin  40 mg Oral Daily    calcium carbonate  1,000 mg Oral Daily    famotidine  20 mg Oral BID    hydrOXYchloroQUINE  300 mg Oral Daily    lacosamide  100 mg Oral Q12H    LIDOcaine  1 patch Transdermal Q24H    melatonin  12 mg Oral Nightly    montelukast  10 mg Oral Daily    mycophenolate mofetil  500 mg Oral BID     predniSONE  10 mg Oral Daily    QUEtiapine  12.5 mg Oral QHS    senna-docusate 8.6-50 mg  1 tablet Oral Daily    silodosin  4 mg Oral Daily    sodium chloride 0.9%  10 mL Intravenous Q6H    sodium chloride 3%  4 mL Nebulization Q4H WAKE    vitamin D  2,000 Units Oral Daily       PRN Medications: acetaminophen, albuterol-ipratropium, bisacodyL, dextrose 50%, glucagon (human recombinant), guaiFENesin 100 mg/5 ml, insulin aspart U-100, labetalol, ondansetron, racepinephrine, simethicone, Flushing PICC Protocol **AND** sodium chloride 0.9% **AND** sodium chloride 0.9%    Family History    None       Tobacco Use    Smoking status: Not on file    Smokeless tobacco: Not on file   Substance and Sexual Activity    Alcohol use: Not on file    Drug use: Not on file    Sexual activity: Not on file     Review of Systems   Constitutional: Positive for activity change. Negative for fatigue and fever.   HENT: Negative for sore throat.    Eyes: Negative for visual disturbance.   Respiratory: Negative for cough and shortness of breath.    Cardiovascular: Negative for chest pain and leg swelling.   Genitourinary: Negative for difficulty urinating.   Musculoskeletal: Positive for gait problem. Negative for back pain.   Neurological: Positive for weakness. Negative for light-headedness and headaches.   Psychiatric/Behavioral: Positive for confusion. Negative for agitation.     Objective:     Vital Signs (Most Recent):  Temp: 98.1 °F (36.7 °C) (01/03/22 0800)  Pulse: 107 (01/03/22 0800)  Resp: 18 (01/03/22 0800)  BP: 131/62 (01/03/22 0800)  SpO2: 100 % (01/03/22 0800)    Vital Signs (24h Range):  Temp:  [97.1 °F (36.2 °C)-98.1 °F (36.7 °C)] 98.1 °F (36.7 °C)  Pulse:  [] 107  Resp:  [16-18] 18  SpO2:  [97 %-100 %] 100 %  BP: (119-137)/(62-65) 131/62     Body mass index is 25.35 kg/m².    Physical Exam  Vitals and nursing note reviewed.   Constitutional:       Appearance: Normal appearance.   HENT:      Mouth/Throat:       Mouth: Mucous membranes are moist.   Eyes:      Extraocular Movements: Extraocular movements intact.      Pupils: Pupils are equal, round, and reactive to light.   Musculoskeletal:      Comments: deconditioned and generalized weakness   Skin:     General: Skin is warm.   Neurological:      Mental Status: She is alert.      Comments: Falling back asleep during exam  Slow to answer questions   Following commands   Psychiatric:         Mood and Affect: Mood normal.     Diagnostic Results:   Labs: Reviewed  ECG: Reviewed  CT: Reviewed    Assessment/Plan:     * SDH (subdural hematoma)  - Multiple follow up CTHs stable, last 12/27  - AMS and PEA arrest on 12/20 was intubated now extubated and improved  - delirium has improved, Epilepsy recommending Vimpat and f/u outpt    Age-related physical debility  - Related to prolonged/acute hospital course.     Recommendations  -  Encourage mobility, OOB in chair at least 3 hours per day, and early ambulation as appropriate  -  PT/OT evaluate and treat  -  Pain management  -  Monitor for and prevent skin breakdown and pressure ulcers  · Early mobility, repositioning/weight shifting every 20-30 minutes when sitting, turn patient every 2 hours, proper mattress/overlay and chair cushioning, pressure relief/heel protector boots  -  DVT prophylaxis    -  Reviewed discharge options (IP rehab, SNF, HH therapy, and OP therapy)    PM&R Recommendation:     At this time, the PM&R team has reviewed this patient's ongoing medical case including inpatient diagnosis, medical history, clinical examination, labs, vitals, current social and functional history to provide the post-acute recommendation as follows:     RECOMMENDATIONS: inpatient rehabilitation due to good motivation/participation with therapies and good potential for recovery.    MEDICAL STABILITY:     At this time, barriers for post-acute rehab admission include repeat CTH.     We will continue to follow.     Thank you for your  consult.     Frances Vazquez NP  Department of Physical Medicine & Rehab  [facility] Neurosurgery ([facility])

## 2022-01-03 NOTE — ASSESSMENT & PLAN NOTE
- Multiple follow up CTHs stable, last 12/27  - AMS and PEA arrest on 12/20 was intubated now extubated and improved  - delirium has improved, Epilepsy recommending Vimpat and f/u outpt

## 2022-01-04 ENCOUNTER — TELEPHONE (OUTPATIENT)
Dept: NEUROSURGERY | Facility: CLINIC | Age: 87
End: 2022-01-04
Payer: MEDICARE

## 2022-01-04 VITALS
DIASTOLIC BLOOD PRESSURE: 58 MMHG | OXYGEN SATURATION: 96 % | TEMPERATURE: 98 F | WEIGHT: 147.69 LBS | BODY MASS INDEX: 25.21 KG/M2 | SYSTOLIC BLOOD PRESSURE: 122 MMHG | RESPIRATION RATE: 16 BRPM | HEIGHT: 64 IN | HEART RATE: 120 BPM

## 2022-01-04 LAB
ALBUMIN SERPL BCP-MCNC: 2.6 G/DL (ref 3.5–5.2)
ANION GAP SERPL CALC-SCNC: 7 MMOL/L (ref 8–16)
BASOPHILS # BLD AUTO: 0.02 K/UL (ref 0–0.2)
BASOPHILS NFR BLD: 0.1 % (ref 0–1.9)
BUN SERPL-MCNC: 11 MG/DL (ref 8–23)
CALCIUM SERPL-MCNC: 7.6 MG/DL (ref 8.7–10.5)
CHLORIDE SERPL-SCNC: 103 MMOL/L (ref 95–110)
CO2 SERPL-SCNC: 24 MMOL/L (ref 23–29)
CREAT SERPL-MCNC: 0.6 MG/DL (ref 0.5–1.4)
DIFFERENTIAL METHOD: ABNORMAL
EOSINOPHIL # BLD AUTO: 0.2 K/UL (ref 0–0.5)
EOSINOPHIL NFR BLD: 0.8 % (ref 0–8)
ERYTHROCYTE [DISTWIDTH] IN BLOOD BY AUTOMATED COUNT: 17.8 % (ref 11.5–14.5)
EST. GFR  (AFRICAN AMERICAN): >60 ML/MIN/1.73 M^2
EST. GFR  (NON AFRICAN AMERICAN): >60 ML/MIN/1.73 M^2
GLUCOSE SERPL-MCNC: 80 MG/DL (ref 70–110)
HCT VFR BLD AUTO: 29.4 % (ref 37–48.5)
HGB BLD-MCNC: 9 G/DL (ref 12–16)
IMM GRANULOCYTES # BLD AUTO: 0.61 K/UL (ref 0–0.04)
IMM GRANULOCYTES NFR BLD AUTO: 3 % (ref 0–0.5)
LYMPHOCYTES # BLD AUTO: 2 K/UL (ref 1–4.8)
LYMPHOCYTES NFR BLD: 9.9 % (ref 18–48)
MAGNESIUM SERPL-MCNC: 1.9 MG/DL (ref 1.6–2.6)
MCH RBC QN AUTO: 28.8 PG (ref 27–31)
MCHC RBC AUTO-ENTMCNC: 30.6 G/DL (ref 32–36)
MCV RBC AUTO: 94 FL (ref 82–98)
MONOCYTES # BLD AUTO: 3.9 K/UL (ref 0.3–1)
MONOCYTES NFR BLD: 19.2 % (ref 4–15)
NEUTROPHILS # BLD AUTO: 13.7 K/UL (ref 1.8–7.7)
NEUTROPHILS NFR BLD: 67 % (ref 38–73)
NRBC BLD-RTO: 0 /100 WBC
PHOSPHATE SERPL-MCNC: 2.5 MG/DL (ref 2.7–4.5)
PHOSPHATE SERPL-MCNC: 2.5 MG/DL (ref 2.7–4.5)
PLATELET # BLD AUTO: 122 K/UL (ref 150–450)
PLATELET BLD QL SMEAR: ABNORMAL
PMV BLD AUTO: 10.7 FL (ref 9.2–12.9)
POCT GLUCOSE: 102 MG/DL (ref 70–110)
POTASSIUM SERPL-SCNC: 4 MMOL/L (ref 3.5–5.1)
RBC # BLD AUTO: 3.12 M/UL (ref 4–5.4)
SODIUM SERPL-SCNC: 134 MMOL/L (ref 136–145)
WBC # BLD AUTO: 20.45 K/UL (ref 3.9–12.7)

## 2022-01-04 PROCEDURE — 25000003 PHARM REV CODE 250: Performed by: NURSE PRACTITIONER

## 2022-01-04 PROCEDURE — 27000646 HC AEROBIKA DEVICE

## 2022-01-04 PROCEDURE — 25000003 PHARM REV CODE 250: Performed by: PSYCHIATRY & NEUROLOGY

## 2022-01-04 PROCEDURE — 85025 COMPLETE CBC W/AUTO DIFF WBC: CPT | Performed by: HOSPITALIST

## 2022-01-04 PROCEDURE — 25000242 PHARM REV CODE 250 ALT 637 W/ HCPCS: Performed by: PHYSICIAN ASSISTANT

## 2022-01-04 PROCEDURE — 99239 HOSP IP/OBS DSCHRG MGMT >30: CPT | Mod: ,,, | Performed by: HOSPITALIST

## 2022-01-04 PROCEDURE — 99900035 HC TECH TIME PER 15 MIN (STAT)

## 2022-01-04 PROCEDURE — 99239 PR HOSPITAL DISCHARGE DAY,>30 MIN: ICD-10-PCS | Mod: ,,, | Performed by: HOSPITALIST

## 2022-01-04 PROCEDURE — 94761 N-INVAS EAR/PLS OXIMETRY MLT: CPT

## 2022-01-04 PROCEDURE — 63600175 PHARM REV CODE 636 W HCPCS: Performed by: NURSE PRACTITIONER

## 2022-01-04 PROCEDURE — 25000003 PHARM REV CODE 250: Performed by: HOSPITALIST

## 2022-01-04 PROCEDURE — 25000242 PHARM REV CODE 250 ALT 637 W/ HCPCS: Performed by: PSYCHIATRY & NEUROLOGY

## 2022-01-04 PROCEDURE — 80069 RENAL FUNCTION PANEL: CPT | Performed by: HOSPITALIST

## 2022-01-04 PROCEDURE — 94640 AIRWAY INHALATION TREATMENT: CPT

## 2022-01-04 PROCEDURE — 83735 ASSAY OF MAGNESIUM: CPT | Performed by: HOSPITALIST

## 2022-01-04 PROCEDURE — 63600175 PHARM REV CODE 636 W HCPCS: Performed by: HOSPITALIST

## 2022-01-04 PROCEDURE — 94799 UNLISTED PULMONARY SVC/PX: CPT

## 2022-01-04 PROCEDURE — A4216 STERILE WATER/SALINE, 10 ML: HCPCS | Performed by: PSYCHIATRY & NEUROLOGY

## 2022-01-04 PROCEDURE — 94664 DEMO&/EVAL PT USE INHALER: CPT

## 2022-01-04 PROCEDURE — 25000242 PHARM REV CODE 250 ALT 637 W/ HCPCS: Performed by: HOSPITALIST

## 2022-01-04 RX ADMIN — APIXABAN 5 MG: 5 TABLET, FILM COATED ORAL at 08:01

## 2022-01-04 RX ADMIN — POTASSIUM & SODIUM PHOSPHATES POWDER PACK 280-160-250 MG 2 PACKET: 280-160-250 PACK at 10:01

## 2022-01-04 RX ADMIN — CHOLECALCIFEROL TAB 25 MCG (1000 UNIT) 2000 UNITS: 25 TAB at 08:01

## 2022-01-04 RX ADMIN — IPRATROPIUM BROMIDE AND ALBUTEROL SULFATE 3 ML: 2.5; .5 SOLUTION RESPIRATORY (INHALATION) at 07:01

## 2022-01-04 RX ADMIN — HYDROXYCHLOROQUINE SULFATE 300 MG: 200 TABLET, FILM COATED ORAL at 10:01

## 2022-01-04 RX ADMIN — AMLODIPINE BESYLATE 10 MG: 10 TABLET ORAL at 08:01

## 2022-01-04 RX ADMIN — ATORVASTATIN CALCIUM 40 MG: 20 TABLET, FILM COATED ORAL at 08:01

## 2022-01-04 RX ADMIN — MYCOPHENOLATE MOFETIL 500 MG: 200 POWDER, FOR SUSPENSION ORAL at 10:01

## 2022-01-04 RX ADMIN — POTASSIUM & SODIUM PHOSPHATES POWDER PACK 280-160-250 MG 2 PACKET: 280-160-250 PACK at 08:01

## 2022-01-04 RX ADMIN — FAMOTIDINE 20 MG: 20 TABLET ORAL at 08:01

## 2022-01-04 RX ADMIN — PREDNISONE 10 MG: 5 TABLET ORAL at 08:01

## 2022-01-04 RX ADMIN — SODIUM CHLORIDE 30 MG/ML INHALATION SOLUTION 4 ML: 30 SOLUTION INHALANT at 07:01

## 2022-01-04 RX ADMIN — CALCIUM CARBONATE (ANTACID) CHEW TAB 500 MG 1000 MG: 500 CHEW TAB at 08:01

## 2022-01-04 RX ADMIN — Medication 10 ML: at 08:01

## 2022-01-04 RX ADMIN — ACETYLCYSTEINE 4 ML: 100 INHALANT RESPIRATORY (INHALATION) at 07:01

## 2022-01-04 RX ADMIN — LACOSAMIDE 100 MG: 10 SOLUTION ORAL at 10:01

## 2022-01-04 RX ADMIN — MONTELUKAST 10 MG: 10 TABLET, FILM COATED ORAL at 08:01

## 2022-01-04 RX ADMIN — SILODOSIN 4 MG: 4 CAPSULE ORAL at 08:01

## 2022-01-04 NOTE — DISCHARGE SUMMARY
[facility]   Hospital Medicine  Discharge Summary          Patient Name: Selma Lux  YOB: 1935    Hospital Medicine Team: [facility] [place] [facility] Tim Castillo MD  Date of Admission:  12/13/2021     Date of Discharge:  01/04/2022  Length of Stay:  LOS: 22 days       Discharge Attending Physician: Tim Castillo MD     Team: [facility] MED B      Principal Problem(s):   SDH (subdural hematoma)     Secondary Problems:  Active Hospital Problems    Diagnosis    *SDH (subdural hematoma)    Stroke    Age-related physical debility    Leukocytosis    RA (rheumatoid arthritis)        HPI: The patient is an 86-year-old female with past medical history of TIA, RA and Afib of Eliquis admitted to [facility] with Right SDH.  She was found on the floor this morning only responding to painful stimuli.  Patient occasionally says her name.GCS 12 noted for EMS.  PCC administered. She had a mechanical fall out of bed on Thursday hitting her head and has been complaining of intermittent headaches since. Her last dose of eliquis was on 12/11. Pending repeat CT head. NSGY consulted. In addition patient was found to be hyponatremic at 118-central line placed and hypertonic saline infusion initiated. Pending repeat CT head. Patient admitted to [facility] for close monitoring and higher level of care.         Hospital Course: 12/13/2021: patient admitted to [facility] s/p fall on 12/11 on Eliquis  12/14/2021 wheezing, prolonged expiratory phase.   12/15/2021 copious respiratory secretions. Remains encephalopathic. Review EEG  12/16/2021 improved respiratory secretions. D/c nasal tracheal airway if minimal secretions.   12/17/2021 restless.   12/18/2021 lethargic, rising BUN/Cr ratio. Fluid resuscitation. Received flumazenil for BDZ   12/19/2021 encephalopathy overnight. Work up neg so far. BUN/Cr >20 from volume contraction.   12/20/2021 left facial twitching overnight. Loaded with vimpat.   12/21/2021 event overnight cardiac  arrest/PEA likely related to respiratory distress. Intubated.   12/22/2021 Add precedex to enable vent weaning.  12/23/2021   extubation trial today  12/24/2021 extubated over cook catheter due to concern for laryngeal edema. Successfully extubated this am. Close monitoring.   12/25/2021 agitated delirium overnight.   12/26/2021 increase cough assist frequency.   12/27: pulmonary toilette, prednisone, daughter updated at bedside, change diet to puree  12/28: resume home apixaban, rescan head tonight, continue pulmonary toilette, can probably step down in AM, melatonin at 22 hr  12/29/2021 CT head stable./ Pureed diet started. Stepping down to medicine.    Interval history  12/30  Increased delirium this am. Eliquis now resumed and CT head stable on elequis.  s/p neurosurgery f/u. No neurosurigcal intervention at this time. plan for CTH head before discharge. SLP recs Mechanical soft, Liquid Diet Level: Thin   urinary retention overrnight -straight cath x1 for PVR >350 cc  12/31  Transfer to hospital medicine. delirium improving . leucocytosis tending down to  17   P replaced. neurocritical care recs to continue vimpat x 3 months and f/u in epilepsy clinic. started on robitussin for cough   1/1 discussed with daughter Rosy Rosado [phone] . admitted with sodium 118. was on lasix 40mg daily. had chronic head ache and following Dr. Ac neurology . has another medical record number - [id number]. was on prednisone 10mg for , with prior history of intubation 2018 . Rheumatology consulted for RA/ - immunosuppressants - optimal dosing. Patient was started on the cellcept by Dr. Rouse due to concern for COOP after discussing it with pulm Dr. Francis in 2019. continue  cellcept 500mg BID. needs follow up outpatient with Dr. Rouse. hydroxychloroquine decreased to 300mg once daily. started on calcium and vitamin D. Urinary retention x2  overnight-  500ml each . Hill catheter placement .needs CTHead  prior to  discharge.  1/2. K and P replaced . able to void without foleys catheter.  carloser prefers [facility]   started on lidocaine patch for neck pain  1/3 PMR eval today. pending rehab .repeat CT head -Stable appearance of chronic right subdural hematoma with associated mass effect and regional sulcal effacement.  Stable 4 mm of leftward midline shift. No evidence of acute infarct or hemorrhage.   1/4discharge to rehab today         Procedures:   * No surgery found *     Hospital Course:       Active Hospital Problems    Diagnosis  POA    *SDH (subdural hematoma) [S06.5X9A]-SDH with brain compression  -non surgical  -repeat imaging reviewed stable  - was on apixaban and reversed with PCC on admission  - apixaban resumed per NSGY, repeat CT head stable  - Neurosurgery follow up in clinic with repeat scam, ordered.  12/30  Increased delirium this am. Eliquis now resumed and CT head stable on elequis.  s/p neurosurgery f/u. No neurosurigcal intervention at this time. plan for CTH head before discharge  12/31  Transfer to hospital medicine. delirium improving .neurocritical care recs to continue vimpat x 3 months and f/u in epilepsy clinic  needs CTHead  prior to discharge.  1/3 .repeat CT head -Stable appearance of chronic right subdural hematoma with associated mass effect and regional sulcal effacement.  Stable 4 mm of leftward midline shift. No evidence of acute infarct or hemorrhage.      Metabolic encephalopathy- secondary to above   Patient with acute delirium. Will avoid narcotics/benzos that are known to worsen condition.  PRN antipsychotics to limit behaviors of self harm. Monitor closely.  12/30   Improving  Continues to perseverate on the dates  Encourage sleep at night and awake during the day  Delirium precautions  Melatonin at night   Prn quetiapine  12/31 AAOx3,      Dysphagia - seconary to above  12/30 . SLP recs Mechanical soft, Liquid Diet Level: Thin       Urinary retention overrnight -1/1 . Urinary  retention x2  overnight-  500ml each  able to void without foleys catheter.        Yes    Cardiac arrest [I46.9] PEA arrest 12/20 with Respiratory arrest   Now post extubation    Anemia   Patient's with Normocytic anemia.. Hemoglobin stable. Etiology likely due to chronic disease .  Current CBC reviewed-    Recent Labs   Lab 01/02/22  0456 01/03/22  1042 01/04/22  0430   HGB 8.7* 9.0* 9.0*         Component Value Date/Time    MCV 94 01/04/2022 0430    RDW 17.8 (H) 01/04/2022 0430     Monitor CBC and transfuse if H/H drops below 7/21.   No    Twitching [R25.3]EEG multiples times on 12/14, 12/15, 12/18, 12/19, 12/20, and 12/21- no electrographic seizures seen. Myoclonic flexion movements of left wrist and left shoulder shrug movements captured on EEG with no EEG correlate and are not electrographic seizures; suspect EPC . 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     no acute issues . continue vimpat.     Hypokalemia - Patient with potassium   Recent Labs   Lab 01/02/22 0456 01/03/22 1042 01/04/22  0430   K 3.1* 4.0 4.0   . replaced. monitor  No    Stroke [I63.9]small aSDH and tSAH. s/p neurosurgery eval . as above   No    Hyponatremia [E87.1]resoved     Thrombocytopenia  Patient with thrombocytopenia   Recent Labs   Lab 01/02/22 0456 01/03/22  1042 01/04/22  0430   * 160 122*   . Platelet counts uptrending.monitor  Yes    Age-related physical debility [R54]PT following . needs rehab   Yes    Leukocytosis [D72.829]     Patient with leucocytosis   Recent Labs   Lab 01/02/22 0456 01/03/22  1042 01/04/22  0430   WBC 17.16* 20.76* 20.45*     . Afebrile. BCX 2 12/21 NGTD , urine culture pending . likely secondary to steroids..  WBC counts stable.  sputum culture with E coli 12/21. completed 5 days of cefepime     Hypophosphatemia - Patient with phosphorus   Recent  Labs   Lab 01/02/22  0456 01/03/22  1042 01/04/22  0430   PHOS 2.4* 2.2*  2.2* 2.5*  2.5*   . replaced. monitor  Yes    RA (rheumatoid arthritis) [M06.9]   --continue mycophenolate and hydroxychloroquine  --continue steroids  --will switch to prednisone as she was on at home, family requesting  --30mg so higher than home dose  1/1 discussed with daughter Rosy Rosado [phone] . admitted with sodium 118. was on lasix 40mg daily. had chronic head ache and following Dr. Ac neurology . has another medical record number - [id number]. was on prednisone 10mg for COOP, with prior history of intubation 2018 Rheumatology consulted for RA/ - immunosuppressants - optimal dosing. Patient was started on the cellcept by Dr. Rouse due to concern for COOP after discussing it with pulm Dr. Francis in 2019. continue  cellcept 500mg BID. needs follow up outpatient with Dr. Rouse. hydroxychloroquine decreased to 300mg once daily. started on calcium and vitamin D.  needs DEXA scan outpatient  Yes      Resolved Hospital Problems   No resolved problems to display.       Disposition- Rehab        Medications:  Reconciled Home Medications:      Medication List      Start taking these medications    acetaminophen 500 MG tablet  Commonly known as: TYLENOL  Take 1 tablet (500 mg total) by mouth every 4 (four) hours as needed for Pain.     albuterol-ipratropium 2.5 mg-0.5 mg/3 mL nebulizer solution  Commonly known as: DUO-NEB  Take 3 mLs by nebulization every 4 (four) hours as needed for Wheezing. Rescue     bisacodyL 10 mg Supp  Commonly known as: DULCOLAX  Place 1 suppository (10 mg total) rectally daily as needed (constipation).     calcium carbonate 200 mg calcium (500 mg) chewable tablet  Commonly known as: TUMS  Take 2 tablets (1,000 mg total) by mouth once daily.     famotidine 20 MG tablet  Commonly known as: PEPCID  Take 1 tablet (20 mg total) by mouth 2 (two) times daily.     guaiFENesin 100 mg/5 ml 100 mg/5 mL  syrup  Commonly known as: ROBITUSSIN  Take 10 mLs (200 mg total) by mouth every 4 (four) hours as needed for Congestion.     lacosamide 10 mg/mL Soln oral solution  Commonly known as: VIMPAT  Take 10 mLs (100 mg total) by mouth every 12 (twelve) hours.     LIDOcaine 5 %  Commonly known as: LIDODERM  Place 1 patch onto the skin once daily. Remove & Discard patch within 12 hours or as directed by MD     melatonin 12 mg Tab  Take 9 mg by mouth nightly.     mycophenolate mofetil 200 mg/mL Susr  Commonly known as: CELLCEPT  Take 2.5 mLs (500 mg total) by mouth 2 (two) times daily.  Replaces: mycophenolate 500 mg Tab     simethicone 80 MG chewable tablet  Commonly known as: MYLICON  Take 1 tablet (80 mg total) by mouth 3 (three) times daily as needed for Flatulence.     vitamin D 1000 units Tab  Commonly known as: VITAMIN D3  Take 2 tablets (2,000 Units total) by mouth once daily.        Change how you take these medications    hydrOXYchloroQUINE 300 mg Tab  Take 300 mg by mouth once daily.  What changed:   · medication strength  · how much to take  · when to take this     predniSONE 10 MG tablet  Commonly known as: DELTASONE  Take 1 tablet (10 mg total) by mouth once daily.  What changed:   · medication strength  · how much to take        Continue taking these medications    amLODIPine 10 MG tablet  Commonly known as: NORVASC  Take 10 mg by mouth once daily.     atorvastatin 40 MG tablet  Commonly known as: LIPITOR  Take 40 mg by mouth once daily.     ELIQUIS 5 mg Tab  Generic drug: apixaban  Take 5 mg by mouth 2 (two) times daily.     montelukast 10 mg tablet  Commonly known as: SINGULAIR  Take 10 mg by mouth once daily.        Stop taking these medications    baclofen 10 MG tablet  Commonly known as: LIORESAL     furosemide 40 MG tablet  Commonly known as: LASIX     gabapentin 300 MG capsule  Commonly known as: NEURONTIN     mycophenolate 500 mg Tab  Commonly known as: CELLCEPT  Replaced by: mycophenolate mofetil 200  mg/mL Susr     NURTEC 75 mg odt  Generic drug: rimegepant     sumatriptan 50 MG tablet  Commonly known as: IMITREX              Discharged Condition: fair    Disposition: Rehab Facility      Consults: Neurology- epilepsy, Neurosurgery and Rehabilitation Medicine          Final Active Diagnoses:    Diagnosis Date Noted POA    PRINCIPAL PROBLEM:  COVID-19 [U07.1] 01/22/2021 Yes    Encephalopathy, metabolic [G93.41] 01/22/2021 Yes    Debility [R53.81] 01/22/2021 Unknown    Discharge planning issues [Z02.9] 01/22/2021 Not Applicable    Chronic kidney disease, stage III (moderate) [N18.30] 01/22/2021 Yes    Seizures [R56.9] 06/02/2020 Yes     Chronic    Moderate malnutrition [E44.0] 03/03/2020 Yes    Dementia without behavioral disturbance [F03.90] 02/24/2020 Yes     Chronic    Hyperlipidemia [E78.5] 10/21/2016 Yes     Chronic    GERD (gastroesophageal reflux disease) [K21.9] 10/21/2016 Yes     Chronic    Depression [F32.9] 10/21/2016 Yes     Chronic    Essential hypertension [I10] 05/21/2015 Yes     Chronic    Diabetes [E11.9] 03/04/2013 Unknown      Problems Resolved During this Admission:    Diagnosis Date Noted Date Resolved POA    Acute hypoxemic respiratory failure [J96.01] 01/22/2021 01/28/2021 Yes    Hypokalemia [E87.6]  01/28/2021 Yes       Follow Up:     Patient Instructions:   No discharge procedures on file.    Laboratory/Diagnostic Data:    CBC/Anemia Labs: Coags:    Recent Labs   Lab 01/02/22  0456 01/03/22  1042 01/04/22  0430   WBC 17.16* 20.76* 20.45*   HGB 8.7* 9.0* 9.0*   HCT 28.3* 29.9* 29.4*   * 160 122*   MCV 93 93 94   RDW 17.8* 17.5* 17.8*    No results for input(s): PT, INR, APTT in the last 168 hours.     Chemistries: ABG:   Recent Labs   Lab 12/29/21  1124 12/29/21  1124 12/31/21  0956 12/31/21  0956 01/01/22  0837 01/01/22  0837 01/02/22  0456 01/03/22  1042 01/04/22  0430      < > 140   < > 139   < > 140 133* 134*   K 3.7   < > 3.6   < > 3.6   < > 3.1* 4.0 4.0   CL  113*   < > 111*   < > 110   < > 109 103 103   CO2 22*   < > 21*   < > 22*   < > 23 24 24   BUN 23   < > 16   < > 14   < > 15 11 11   CREATININE 0.7   < > 0.7   < > 0.6   < > 0.6 0.6 0.6   CALCIUM 8.3*   < > 8.3*   < > 8.0*   < > 8.0* 7.8* 7.6*   PROT 4.8*  --  4.8*  --   --   --   --   --   --    BILITOT 1.3*  --  1.3*  --   --   --   --   --   --    ALKPHOS 62  --  69  --   --   --   --   --   --    ALT 28  --  29  --   --   --   --   --   --    AST 21  --  24  --   --   --   --   --   --    MG 1.7   < > 1.6   < > 1.6  --   --  1.4* 1.9   PHOS 1.7*   < > 2.2*   < > 2.2*  2.2*   < > 2.4* 2.2*  2.2* 2.5*  2.5*    < > = values in this interval not displayed.    No results for input(s): PH, PCO2, PO2, HCO3, POCSATURATED, BE in the last 168 hours.     POCT Glucose: HbA1c:    Recent Labs   Lab 01/02/22  1233 01/02/22  1719 01/02/22  2129 01/03/22  0724 01/03/22  1144 01/03/22  2158   POCTGLUCOSE 153* 234* 112* 86 128* 130*    Hemoglobin A1C   Date Value Ref Range Status   12/13/2021 5.3 4.0 - 5.6 % Final     Comment:     ADA Screening Guidelines:  5.7-6.4%  Consistent with prediabetes  >or=6.5%  Consistent with diabetes    High levels of fetal hemoglobin interfere with the HbA1C  assay. Heterozygous hemoglobin variants (HbS, HgC, etc)do  not significantly interfere with this assay.   However, presence of multiple variants may affect accuracy.          Cardiac Enzymes: Ejection Fractions:    No results for input(s): CPK, CPKMB, MB, TROPONINI in the last 72 hours. EF   Date Value Ref Range Status   12/13/2021 65 % Final          No results for input(s): COLORU, APPEARANCEUA, PHUR, SPECGRAV, PROTEINUA, GLUCUA, KETONESU, BILIRUBINUA, OCCULTUA, NITRITE, UROBILINOGEN, LEUKOCYTESUR, RBCUA, WBCUA, BACTERIA, SQUAMEPITHEL, HYALINECASTS in the last 48 hours.    Invalid input(s): WRIGHTSUR    Procalcitonin (ng/mL)   Date Value   12/22/2021 0.67 (H)   12/21/2021 0.59 (H)     No results found for: BNP  No results found for: CRP,  SEDRATE  No results found for: DDIMER  No results found for: FERRITIN  No results found for: LDH  CPK (U/L)   Date Value   12/13/2021 123     No results found for this or any previous visit.  POC Rapid COVID (no units)   Date Value   12/13/2021 Negative       Microbiology labs for the last week  Microbiology Results (last 7 days)     ** No results found for the last 168 hours. **            Reviewed and noted in plan where applicable- Please see chart for full lab data.    Lines/Drains:  PICC Double Lumen 12/15/21 1616 right basilic (Active)   Verification by X-ray Yes 01/03/22 0800   Site Assessment No drainage;No redness;No swelling;No warmth 01/02/22 1955   Extremity Assessment Distal to IV No redness;No swelling;No warmth;No abnormal discoloration 01/03/22 0800   Line Securement Device Secured with sutureless device 01/03/22 0800   Dressing Type Biopatch in place 01/03/22 0800   Dressing Status Clean;Dry;Intact 01/03/22 0800   Dressing Intervention Integrity maintained 01/03/22 0800   Date on Dressing 12/30/21 01/03/22 0800   Dressing Due to be Changed 01/06/22 01/03/22 0800   Left Lumen Patency/Care Flushed w/o difficulty;Normal saline locked 01/03/22 0800   Right Lumen Patency/Care Flushed w/o difficulty;Normal saline locked 01/03/22 0800   Current Insertion Depth (cm) 33 cm 12/30/21 1505   Current Exposed Catheter (cm) 0 cm 12/28/21 0705   Extremity Circumference (cm) 33 cm 12/15/21 1616   Waveform Other (Comments) 12/31/21 1102   Line Necessity Review Poor venous access 01/03/22 0800   Number of days: 19       Imaging  ECG Results          ECG 12 lead (Final result)  Result time 12/21/21 14:15:54    Final result by Interface, Lab In [facility] (12/21/21 14:15:54)             Narrative:    Test Reason : I63.9,    Vent. Rate : 126 BPM     Atrial Rate : 252 BPM     P-R Int : 000 ms          QRS Dur : 076 ms      QT Int : 330 ms       P-R-T Axes : 265 010 -53 degrees     QTc Int : 477 ms    Atrial flutter with 2:1  A-V conduction    Right bundle branch block  ST and T wave abnormality, consider inferior ischemia  Abnormal ECG  When compared with ECG of 13-DEC-2021 10:53,  Atrial flutter has replaced Sinus rhythm  ST now depressed in Lateral leads  T wave inversion now evident in Inferior leads  Confirmed by Mukesh Cunningham MD ([id number]) on 12/21/2021 2:15:46 PM    Referred By: VINAY   SELF           Confirmed By:Mukesh Cunningham MD                           EKG 12-lead (Final result)  Result time 12/13/21 15:34:33    Final result by Interface, Lab In [facility] (12/13/21 15:34:33)             Narrative:    Test Reason : S06.5X9A,    Vent. Rate : 100 BPM     Atrial Rate : 100 BPM     P-R Int : 150 ms          QRS Dur : 088 ms      QT Int : 358 ms       P-R-T Axes : 058 -34 045 degrees     QTc Int : 461 ms    Normal sinus rhythm  Left axis deviation  Abnormal ECG  No previous ECGs available  Confirmed by Jerod MICHAEL MD ([id number]) on 12/13/2021 3:34:24 PM    Referred By: VINAY   SELF           Confirmed By:Jerod MICHAEL MD                            Results for orders placed during the hospital encounter of 12/13/21    Echo    Interpretation Summary  · Technically difficult study  · The left ventricle is normal in size with concentric hypertrophy and normal systolic function. The estimated ejection fraction is 65%.  · Normal right ventricular size with normal right ventricular systolic function.  · Normal left ventricular diastolic function.  · Normal central venous pressure (3 mmHg).      CT Head Without Contrast  Narrative: EXAMINATION:  CT HEAD WITHOUT CONTRAST    CLINICAL HISTORY:  bleed f/u;    TECHNIQUE:  Low dose axial CT images obtained throughout the head without the use of intravenous contrast.  Axial, sagittal and coronal reconstructions were performed.    COMPARISON:  CT 12/28/2021, 12/27/2021, 12/21/2021    FINDINGS:  Intracranial compartment:    Redemonstration of hypodense fluid collection overlying the right cerebral  hemisphere suggestive of chronic blood products.  There is persistent mass effect and sulcal effacement of the underlying parenchyma.  Approximately 4 mm of leftward midline shift similar to prior exam.  No evidence of new or interspersed hypodensity to suggest active bleeding.    Generalized cerebral volume loss.  Stable appearance the ventricles noting asymmetric prominence of the left lateral ventricle.  No evidence of hydrocephalus.    Patchy ventricular hypoattenuation suggestive chronic microvascular ischemic disease.  Right cerebellar small remote infarct and right pontine tiny remote lacunar type infarct unchanged.  No evidence of acute major vascular distribution infarct.  No evidence of intraparenchymal hemorrhage.    Skull/extracranial contents (limited evaluation):    No acute displaced calvarial fracture.  Hyperostosis frontalis.  Scattered atherosclerotic calcification about the skull base.  Mastoid air cells are clear.  Mild mucosal thickening in the right axillary and right sphenoid sinuses.  Impression: Stable appearance of chronic right subdural hematoma with associated mass effect and regional sulcal effacement.  Stable 4 mm of leftward midline shift.    No evidence of acute infarct or hemorrhage elsewhere.    Electronically signed by resident: Marques Torres  Date:    01/03/2022  Time:    18:33    Electronically signed by: Keenan Machuca MD  Date:    01/03/2022  Time:    18:47      Imaging:  Imaging Results           MRI Brain Without Contrast (Final result)  Result time 12/13/21 12:21:16    Final result by Brock Barajas DO (12/13/21 12:21:16)             Impression:      Thin right cerebral convexity recent subdural hemorrhage corresponding to abnormality seen on CT    Mass effect with approximately 3-4 mm of leftward midline shift without hydrocephalus    No evidence for acute infarction with scattered small foci of prior infarction basal ganglia, right tatyana and bilateral cerebellar  hemispheres    T2 FLAIR signal abnormality supratentorial white matter and tatyana which is nonspecific and suggestive for underlying chronic ischemic change    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      Electronically signed by: Brock Barajas DO  Date:    12/13/2021  Time:    12:21           Narrative:    EXAMINATION:  MRI BRAIN WITHOUT CONTRAST    CLINICAL HISTORY:  Stroke, follow up;    TECHNIQUE:  Sagittal and axial T1, axial T2, axial FLAIR, axial gradient and axial diffusion imaging of the whole brain without contrast.    COMPARISON:  CT with CTA 12/13/2021 earlier same day    FINDINGS:  Extra-axial thin collection overlying the right cerebral convexity compatible with recent subdural hemorrhage as seen on CT.  This measures approximately 1 cm in greatest thickness overlying the right frontal lobe.  There is mass effect and partial compression right lateral ventricle with approximately 3-4 mm of leftward midline shift as seen on CT.  Ventricles relatively stable without hydrocephalus.  There is small foci of a cystic encephalomalacia in the bilateral cerebellar hemispheres more numerous on the right with additional components in the basal ganglia, thalami and right tatyana suggestive for remote infarcts.  There is no restricted diffusion to suggest acute infarction.  Major intracranial T2 flow voids are present.    There is locular extra-axial fluid signal in the left middle cranial fossa with component extending ventrally with thinning and possible dehiscence of the underlying brain ring of the sphenoid suggestive for temporal meningocele with encephalocele not excluded.    Ill-defined T2 FLAIR signal hyperintensity supratentorial white matter and tatyana while nonspecific suggestive for underlying superimposed chronic microvascular ischemic change.  Study is distorted by motion artifacts.    This report was flagged in Epic as  abnormal.                             X-Ray Chest AP Portable (Final result)  Result time 12/13/21 11:44:31    Final result by Henok Barnes MD (12/13/21 11:44:31)             Impression:      1. Cardiomegaly.  2. Widening of the mediastinum with findings highly suggestive of a right paratracheal mass with deviation of the trachea to the left as above.  Further evaluation of the mediastinum with a dedicated CT scan of the chest with contrast is suggested.      Electronically signed by: Henok Barnes MD  Date:    12/13/2021  Time:    11:44           Narrative:    EXAMINATION:  XR CHEST AP PORTABLE    CLINICAL HISTORY:  altered mental status;    TECHNIQUE:  Single frontal view of the chest was performed.    COMPARISON:  None    FINDINGS:  There are no prior chest radiographs for comparison at this time.    There is borderline cardiomegaly.  Widening of the mediastinum is noted.  There is prominence of the right paratracheal soft tissues with mild lobulation to the lateral wall of the mediastinum on the right.  A mediastinal mass suspected.  There is tracheal deviation to the left.  To evaluate the mediastinum a dedicated CT scan of the chest with contrast is suggested.    Nonspecific elevation of the right hemidiaphragm.  No gross acute lobar consolidations noted.  No pleural effusions.  There is no pneumothorax.    There are cardiac monitoring leads over the chest.  There is moderate to severe osteoarthritic changes of the shoulder joints bilaterally.                              CTA STROKE MULTI-PHASE (Final result)  Result time 12/13/21 11:30:26    Final result by Brock Barajas DO (12/13/21 11:30:26)             Impression:      CTA head: Allowing for limitations with poor arterial contrast opacification intracranially there is no definite proximal high-grade stenosis or proximal occlusion.    CTA neck: Atherosclerotic disease carotid bifurcations and proximal ICAs with concern for 50-60% right proximal ICA  stenosis, although distorted by artifacts.  Clinical correlation further evaluation with carotid ultrasonography may be of further value    Abrupt termination of flow within the right distal subclavian vein at the thoracic inlet concerning for venous stenosis or probable chronic occlusion with collateral flow throughout neck and filling of the SVC likely via collateral flow from the left IJ.    Please note stenosis or occlusion in the right proximal subclavian vein is suspected be somewhat in association to the large right thyroid lesion with substernal extension.  Clinical correlation advised.    Please note there in addition there is mass effect on the trachea airway which is significantly narrowed as detailed above    CT head: Thin extra-axial hyperdense collection overlies the right cerebral convexity compatible with acute/recent subdural hemorrhage.    No definite parenchymal hemorrhage allowing for artifacts related to motion    Mass effect with 3-4 mm leftward midline shift without hydrocephalus    Few patchy areas of decreased attenuation supratentorial white matter which are nonspecific and may represent chronic ischemic change    Small region of hypoattenuation in the right cerebellum concerning for prior infarction though age indeterminate.    Clinical correlation and further evaluation with MRI as warranted if patient compatible.    Please see above for additional details.      Electronically signed by: Brock Barajas DO  Date:    12/13/2021  Time:    11:30           Narrative:    EXAMINATION:  CTA STROKE MULTI-PHASE    CLINICAL HISTORY:  Neuro deficit, acute, stroke suspected;    TECHNIQUE:  5 mm axial images of the head pre and post contrast with 0.625 mm axial CTA images of the head neck post-contrast.  Coronal and sagittal MPR and MIP imaging was performed 100 ml of Omnipaque 350 contrast was injected intravenously.  Please note study was performed in multiphase technique with 3 arterial passes  through the head.    COMPARISON:  None    FINDINGS:  CT head  without contrast: There is thin extra-axial hyperdense collection overlying the right cerebral convexity compatible with acute/recent subdural hemorrhage.  There is significant distortion of the examination secondary to motion.  Allowing for limitation no definite parenchymal hemorrhage.  Mass effect with partial compression right lateral ventricle and approximate 3-4 mm of leftward midline shift.  Ventricular configuration otherwise within normal limits without evidence for hydrocephalus.  There is patchy ill-defined decreased attenuation supratentorial white matter which is nonspecific and suggestive for chronic ischemic change.  There is small tubular region of hypodensity in the right cerebellum suggestive for encephalomalacia and prior infarction though age indeterminate.    CTA head: Evaluation distorted by motion artifact and limited by poor arterial contrast opacification despite triple phase technique.  In addition there is artifacts distortion and study secondary to high density contrast throughout the venous vasculature with innumerable collaterals.    Anterior circulation: Allowing for limitations is the bilateral distal cervical petrous cavernous and supraclinoid segments of the ICAs are patent.  There is heavy atherosclerotic plaquing in the cavernous segments of the ICAs without significant focal stenosis.  The bilateral anterior and middle cerebral arteries are grossly patent without focal high-grade stenosis    Posterior circulation: The distal left vertebral artery is diminutive and may functionally ending in PICA.  The distal right vertebral artery, basilar artery and posterior cerebral arteries are patent.  There is prominent bilateral posterior communicating arteries.    CTA neck: Atherosclerotic plaquing of the arch without significant focal stenosis origin the great vessels allowing for poor arterial contrast opacification.  There  is distortion of the proximal common carotid arteries displaced by large thyroid lesion with substernal extension.  There is atherosclerotic plaquing carotid bifurcations and proximal ICAs.  Allowing for artifacts and poor teary ule contrast opacification there is less than 50% left proximal ICA stenosis by NASCET criteria    There is irregularity and narrowing of the right carotid bifurcation and proximal ICA with questioned stenosis in the right proximal ICA although significantly distorted by high density the contrast within the adjacent venous vasculature causing beam hardening artifacts.  Overall concerning for 50-60% right proximal ICA stenosis by NASCET criteria within the limits of the study.    Origin of the right vertebral artery from the right subclavian arteries within normal limits.  The origin of the left vertebral artery from the left subclavian artery is not well seen.  The right vertebral artery is partially distorted by high-density contrast within the adjacent perivertebral venous plexus although grossly patent throughout its course.  The left vertebral artery originates from the proximal subclavian artery and is diminutive throughout its course allowing for small caliber no significant focal stenosis with distal left vertebral artery severely diminutive and likely functionally ends in PICA.    Please note there is prominent high density contrast within the venous vasculature within the right subclavian vein extending to the right internal jugular vein with abrupt termination flow within the right subclavian vein at the thoracic inlet concerning for venous stenosis and probable occlusion.  There is extensive venous collateral opacification throughout the neck and right chest with dense venous contrast opacification in the left jugular vein and extending to the SVC.    Pharynx/larynx: Evaluation distorted by scatter artifact from dental metal and and high-density venous contrast.  Allowing for  artifacts the nasopharynx is within normal limits.  There is medialized carotids no retropharyngeal soft tissues as well as prominent venous opacification with impression posterior pharyngeal wall slight narrowed caliber airway.    Oral cavity and the buccal space     distorted by dental metal.    Glands: Bilateral parotid and submandibular glands are within normal limits. There is heterogeneous enlargement of the thyroid gland bilaterally with dominant heterogeneous predominantly hypodense nodule in the right lobe inferiorly with substernal extension this measures at least 7 cm in greatest dimension.  Mass effect and distortion of the trachea which is small in caliber measuring 0.5 cm transverse and 0.8 cm AP image 57 series 5.  Clinical correlation advised.    No evidence for adenopathy throughout the neck by size criteria.    Few linear opacities in the lungs which may represent atelectasis or scarring.  There is patchy mosaic ground-glass attenuation of the lungs which is indeterminate small airway disease not excluded.    Trace grade 1 anterolisthesis of C2 on C3 and grade 1 retrolisthesis of C3 on C4.  There is degenerative change with intervertebral disc height loss and endplate degeneration at all levels.  No evidence for acute fracture cervical spine.    Partially visualized advanced degenerative change bilateral shoulder joints.    Case discussed with Dr. Moon on 12/13/2021 at 11:07.  This report was flagged in Epic as abnormal.                                Follow Up Instructions:     No future appointments.    Discharge Instructions:  Discharge Procedure Orders   CT Head Without Contrast   Standing Status: Future Standing Exp. Date: 01/01/23     Order Specific Question Answer Comments   May the Radiologist modify the order per protocol to meet the clinical needs of the patient? Yes      Ambulatory referral/consult to Rheumatology   Standing Status: Future   Referral Priority: Routine Referral Type:  Consultation   Referral Reason: Specialty Services Required   Requested Specialty: Rheumatology   Number of Visits Requested: 1     Ambulatory referral/consult to Neurosurgery   Standing Status: Future   Referral Priority: Routine Referral Type: Consultation   Referral Reason: Specialty Services Required   Requested Specialty: Neurosurgery   Number of Visits Requested: 1     Ambulatory referral/consult to Neurology Epilepsy   Standing Status: Future   Referral Priority: Routine Referral Type: Consultation   Referral Reason: Specialty Services Required   Requested Specialty: Neurology   Number of Visits Requested: 1             Tim Castillo MD  Attending Staff Physician  [facility]

## 2022-01-04 NOTE — PT/OT/SLP PROGRESS
Speech Language Pathology      Selma Lux  MRN: [id number]    Patient not seen today secondary to imminent D/C this date. Per RN, pt to be D/C approximately 1100. Will follow-up should D/C plans be revised.      1/4/2022

## 2022-01-04 NOTE — PLAN OF CARE
Problem: Adult Inpatient Plan of Care  Goal: Plan of Care Review  Outcome: Ongoing, Progressing  Goal: Absence of Hospital-Acquired Illness or Injury  Outcome: Ongoing, Progressing  Goal: Optimal Comfort and Wellbeing  Outcome: Ongoing, Progressing     Problem: Fall Injury Risk  Goal: Absence of Fall and Fall-Related Injury  Outcome: Ongoing, Progressing     Problem: Skin Injury Risk Increased  Goal: Skin Health and Integrity  Outcome: Ongoing, Progressing     POC reviewed with pt. Pt verbalized understanding. VSS.  Full assessment in flowsheets. Bed locked in flowsheets. Call light within reach. Daughter at bedside. JODI

## 2022-01-04 NOTE — PLAN OF CARE
[facility] Care Plan    POC reviewed with Selma Lux and family at 1930. Pt verbalized understanding. Questions and concerns addressed. No acute events overnight. Pt progressing toward goals. Will continue to monitor. See below and flowsheets for full assessment and VS info.       Neuro:  Mobile Coma Scale  Best Eye Response: 4-->(E4) spontaneous  Best Motor Response: 6-->(M6) obeys commands  Best Verbal Response: 5-->(V5) oriented  Marion Coma Scale Score: 15  Assessment Qualifiers: patient not sedated/intubated  Pupil PERRLA: yes     24hr Temp:  [98 °F (36.7 °C)-98.5 °F (36.9 °C)]     CV:   Rhythm: normal sinus rhythm  BP goals:   SBP < 160  MAP > 65    Resp:   O2 Device (Oxygen Therapy): room air  Vent Mode: Spont  Set Rate: 8 BPM  Oxygen Concentration (%): 21  Vt Set: 450 mL  PEEP/CPAP: 5 cmH20  Pressure Support: 10 cmH20    Plan: N/A    GI/:  SCOOBY Total Score: 20  Diet/Nutrition Received: mechanical/dental soft  Last Bowel Movement: 01/04/22  Voiding Characteristics: incontinence    Intake/Output Summary (Last 24 hours) at 1/4/2022 0652  Last data filed at 1/4/2022 0600  Gross per 24 hour   Intake 840 ml   Output --   Net 840 ml     Unmeasured Output  Urine Occurrence: 2  Stool Occurrence: 1  Pad Count: 2    Labs/Accuchecks:  Recent Labs   Lab 01/04/22  0430   WBC 20.45*   RBC 3.12*   HGB 9.0*   HCT 29.4*   *      Recent Labs   Lab 12/31/21  0956 01/01/22  0837 01/04/22  0430      < > 134*   K 3.6   < > 4.0   CO2 21*   < > 24   *   < > 103   BUN 16   < > 11   CREATININE 0.7   < > 0.6   ALKPHOS 69  --   --    ALT 29  --   --    AST 24  --   --    BILITOT 1.3*  --   --     < > = values in this interval not displayed.    No results for input(s): PROTIME, INR, APTT, HEPANTIXA in the last 168 hours. No results for input(s): CPK, CPKMB, TROPONINI, MB in the last 168 hours.    Electrolytes: Electrolytes replaced  Accuchecks: ACHS    Gtts:       LDA/Wounds:  Lines/Drains/Airways       Peripherally  Inserted Central Catheter Line              PICC Double Lumen 12/15/21 1616 right basilic 19 days                  Wounds: No  Wound care consulted: No   Problem: Adult Inpatient Plan of Care  Goal: Plan of Care Review  1/4/2022 0651 by Gege Gamboa RN  Outcome: Ongoing, Progressing  1/4/2022 0646 by Gege Gamboa RN  Outcome: Ongoing, Progressing  Goal: Patient-Specific Goal (Individualized)  Description: Admit Date:     Admit Dx:    History reviewed. No pertinent past medical history.    History reviewed. No pertinent surgical history.    Individualization:   1.     Restraints: (date/time/why or N/A)         1/4/2022 0651 by Gege Gamboa RN  Outcome: Ongoing, Progressing  1/4/2022 0646 by Gege Gamboa RN  Outcome: Ongoing, Progressing  Goal: Absence of Hospital-Acquired Illness or Injury  1/4/2022 0651 by Gege Gamboa RN  Outcome: Ongoing, Progressing  1/4/2022 0646 by Gege Gamboa RN  Outcome: Ongoing, Progressing  Goal: Optimal Comfort and Wellbeing  1/4/2022 0651 by Gege Gamboa RN  Outcome: Ongoing, Progressing  1/4/2022 0646 by Gege Gamboa RN  Outcome: Ongoing, Progressing     Problem: Infection  Goal: Absence of Infection Signs and Symptoms  1/4/2022 0651 by Gege Gamboa RN  Outcome: Ongoing, Progressing  1/4/2022 0646 by Gege Gamboa RN  Outcome: Ongoing, Progressing     Problem: Attention and Thought Clarity Impairment (Delirium)  Goal: Improved Attention and Thought Clarity  1/4/2022 0651 by Gege Gamboa RN  Outcome: Ongoing, Progressing  1/4/2022 0646 by Gege Gamboa RN  Outcome: Ongoing, Progressing     Problem: Fall Injury Risk  Goal: Absence of Fall and Fall-Related Injury  1/4/2022 0651 by Gege Gamboa RN  Outcome: Ongoing, Progressing  1/4/2022 0646 by Gege Gamboa RN  Outcome: Ongoing, Progressing     Problem: Adjustment to Illness (Stroke, Hemorrhagic)  Goal: Optimal Coping  1/4/2022 0651 by Gege Gamboa RN  Outcome: Ongoing, Progressing  1/4/2022 0646 by Gege Gamboa RN  Outcome: Ongoing,  Progressing     Problem: Cognitive Impairment (Stroke, Hemorrhagic)  Goal: Optimal Cognitive Function  1/4/2022 0651 by Gege Gamboa RN  Outcome: Ongoing, Progressing  1/4/2022 0646 by Gege Gamboa RN  Outcome: Ongoing, Progressing     Problem: Functional Ability Impaired (Stroke, Hemorrhagic)  Goal: Optimal Functional Ability  Outcome: Ongoing, Progressing

## 2022-01-04 NOTE — TELEPHONE ENCOUNTER
LM for pt or pt's daughter, pt's follow up appointment with CT and Dr. Justin is scheduled for 2/1/22.  Letter mailed to pt.     ----- Message -----  From: Shady Sanchez MD  Sent: 1/1/2022  10:11 AM CST  To: Nhan Sena Staff    Please schedule patient for clinic follow-up 4 weeks after discharge with outpatient CTH. Thank you.

## 2022-01-04 NOTE — PLAN OF CARE
[facility] ([facility])  Discharge Final Note    Primary Care Provider: Bhargav Hirsch MD  Expected Discharge Date: 1/4/2022  Patient medically ready for discharge to [facility] today.  Nurse can call report to [phone].  Transportation scheduled for 11 am via stretcher (approximate time) per [facility].   Is family/patient aware of discharge Yes, SW notified pt/family at bedside.    Final Discharge Note (most recent)     Final Note - 01/04/22 1020        Final Note    Assessment Type Final Discharge Note     Anticipated Discharge Disposition Rehab Facility     What phone number can be called within the next 1-3 days to see how you are doing after discharge? [phone]     Hospital Resources/Appts/Education Provided Provided patient/caregiver with written discharge plan information;Provided education on problems/symptoms using teachback        Post-Acute Status    Post-Acute Authorization Placement     Post-Acute Placement Status Set-up Complete/Auth obtained     Discharge Delays None known at this time               Important Message from Medicare         Referral Info (most recent)     Referral Info    No documentation.                 No future appointments.  GRANT Delgado  Case Management  Ext: [phone]  01/04/2022

## 2022-01-05 NOTE — PT/OT/SLP DISCHARGE
Occupational Therapy Discharge Summary    Selma Lux  MRN: [id number]   Principal Problem: SDH (subdural hematoma)      Patient Discharged from acute Occupational Therapy on 1/4/22.  Please refer to prior OT note dated 12/30/21 for functional status.    Assessment:      Patient appropriate for care in another setting.    Objective:     GOALS:   Multidisciplinary Problems     Occupational Therapy Goals        Problem: Occupational Therapy Goal    Goal Priority Disciplines Outcome Interventions   Occupational Therapy Goal     OT, PT/OT Ongoing, Progressing    Description: Pt. Re-evaluated 12-26-21 Goals to be met 01-10-22  UBD with Min A  Bed mobility with Mod A  Grooming seated EOB with Min A  Toileting with Mod A  SQPT to bedside chair with Mod A  Pt. To follow 3/3 simple commands  Sit to stand with Mod A      Goals to be met by: 12/26/2021 (10 days)     Patient will increase functional independence with ADLs by performing:    UE Dressing with Moderate Assistance.  LE Dressing with Maximum Assistance.  Grooming while seated with Minimal Assistance.  Toileting from bedside commode with Minimal Assistance for hygiene and clothing management.   Sitting at edge of bed x10 minutes with Minimal Assistance.  Rolling to Bilateral with Minimal Assistance.   Supine to sit with Minimal Assistance.  Stand pivot transfers with Moderate Assistance.  Step transfer with Moderate Assistance  Toilet transfer to bedside commode with Moderate Assistance.                     Reasons for Discontinuation of Therapy Services  Transfer to alternate level of care.      Plan:     Patient Discharged to: Inpatient Rehab    1/5/2022

## 2022-01-12 ENCOUNTER — TELEPHONE (OUTPATIENT)
Dept: INTERNAL MEDICINE | Facility: CLINIC | Age: 87
End: 2022-01-12
Payer: MEDICARE

## 2022-01-12 NOTE — TELEPHONE ENCOUNTER
Spoke to pt daughter she stated pt is in the hospital pt or daughter did not order any medication refll pt daughter stated that must be a automatic message from the pharmacy

## 2022-01-13 ENCOUNTER — HOSPITAL ENCOUNTER (OUTPATIENT)
Dept: RADIOLOGY | Facility: HOSPITAL | Age: 87
Discharge: HOME | End: 2022-01-13
Attending: PHYSICAL MEDICINE & REHABILITATION
Payer: MEDICARE

## 2022-01-13 PROCEDURE — 71045 X-RAY EXAM CHEST 1 VIEW: CPT | Mod: 26,,, | Performed by: RADIOLOGY

## 2022-01-13 PROCEDURE — 71045 XR CHEST 1 VIEW: ICD-10-PCS | Mod: 26,,, | Performed by: RADIOLOGY

## 2022-01-14 NOTE — LETTER
August 30, 2017      Branden Rubio MD  [address] [facility] [address]           [facility]  Neurology  [address]  [facility] [address]  Phone: [phone]  Fax: [phone]          Patient: Selma Alonzo Lux MD   MR Number: [id number]   YOB: 1935   Date of Visit: 8/30/2017       Dear Dr. Branden Rubio:    Thank you for referring Selma Lux to me for evaluation. Attached you will find relevant portions of my assessment and plan of care.    If you have questions, please do not hesitate to call me. I look forward to following Selma Lux along with you.    Sincerely,    Baldomero Giang MD    Enclosure  CC:  No Recipients    If you would like to receive this communication electronically, please contact [email or web address] or [phone] to request more information on [employer] access.    For providers and/or their staff who would like to refer a patient to [facility], please contact us through our one-stop-shop provider referral line, [facility], at [phone].    If you feel you have received this communication in error or would no longer like to receive these types of communications, please e-mail [email or web address]          Ambulatory

## 2022-01-18 NOTE — PLAN OF CARE
Problem: Adult Inpatient Plan of Care  Goal: Plan of Care Review  Outcome: Ongoing (interventions implemented as appropriate)  Patient  AAOX4 vital signs stable. Safety and infection precautions maintained. Patient is comfortable at this time,bed is locked and in a low position with call light in reach. Will cont to monitor.       (4) no limitation

## 2022-01-22 ENCOUNTER — HOSPITAL ENCOUNTER (OUTPATIENT)
Dept: RADIOLOGY | Facility: HOSPITAL | Age: 87
Discharge: HOME | End: 2022-01-22
Attending: NURSE PRACTITIONER
Payer: MEDICARE

## 2022-01-22 PROCEDURE — 71045 X-RAY EXAM CHEST 1 VIEW: CPT | Mod: 26,,, | Performed by: RADIOLOGY

## 2022-01-22 PROCEDURE — 71045 XR CHEST 1 VIEW: ICD-10-PCS | Mod: 26,,, | Performed by: RADIOLOGY

## 2022-01-23 ENCOUNTER — HOSPITAL ENCOUNTER (INPATIENT)
Facility: HOSPITAL | Age: 87
LOS: 16 days | Discharge: NURSING, REHAB OR LONG-TERM CARE | DRG: 177 | End: 2022-02-08
Attending: EMERGENCY MEDICINE | Admitting: INTERNAL MEDICINE
Payer: MEDICARE

## 2022-01-23 DIAGNOSIS — U07.1 PNEUMONIA DUE TO COVID-19 VIRUS: ICD-10-CM

## 2022-01-23 DIAGNOSIS — R06.00 DYSPNEA: ICD-10-CM

## 2022-01-23 DIAGNOSIS — R94.31 QT PROLONGATION: ICD-10-CM

## 2022-01-23 DIAGNOSIS — M05.79 SEROPOSITIVE RHEUMATOID ARTHRITIS OF MULTIPLE SITES: ICD-10-CM

## 2022-01-23 DIAGNOSIS — I48.0 AF (PAROXYSMAL ATRIAL FIBRILLATION): ICD-10-CM

## 2022-01-23 DIAGNOSIS — U07.1 COVID-19 VIRUS INFECTION: ICD-10-CM

## 2022-01-23 DIAGNOSIS — J96.01 ACUTE HYPOXEMIC RESPIRATORY FAILURE: ICD-10-CM

## 2022-01-23 DIAGNOSIS — J12.82 PNEUMONIA DUE TO COVID-19 VIRUS: ICD-10-CM

## 2022-01-23 DIAGNOSIS — R41.82 ALTERED MENTAL STATUS, UNSPECIFIED ALTERED MENTAL STATUS TYPE: ICD-10-CM

## 2022-01-23 DIAGNOSIS — N18.30 CHRONIC RENAL FAILURE SYNDROME, STAGE 3 (MODERATE): ICD-10-CM

## 2022-01-23 DIAGNOSIS — I50.22 CHRONIC SYSTOLIC (CONGESTIVE) HEART FAILURE: ICD-10-CM

## 2022-01-23 DIAGNOSIS — J84.116 CRYPTOGENIC ORGANIZING PNEUMONIA: ICD-10-CM

## 2022-01-23 DIAGNOSIS — J44.1 COPD EXACERBATION: ICD-10-CM

## 2022-01-23 DIAGNOSIS — R78.81 BACTEREMIA: ICD-10-CM

## 2022-01-23 DIAGNOSIS — N39.0 URINARY TRACT INFECTION WITHOUT HEMATURIA, SITE UNSPECIFIED: Primary | ICD-10-CM

## 2022-01-23 PROBLEM — D84.821 IMMUNOCOMPROMISED STATE DUE TO DRUG THERAPY: Status: ACTIVE | Noted: 2022-01-23

## 2022-01-23 PROBLEM — Z79.899 IMMUNOCOMPROMISED STATE DUE TO DRUG THERAPY: Status: ACTIVE | Noted: 2022-01-23

## 2022-01-23 LAB
ALBUMIN SERPL BCP-MCNC: 2.2 G/DL (ref 3.5–5.2)
ALLENS TEST: ABNORMAL
ALP SERPL-CCNC: 105 U/L (ref 55–135)
ALT SERPL W/O P-5'-P-CCNC: 13 U/L (ref 10–44)
ANION GAP SERPL CALC-SCNC: 10 MMOL/L (ref 8–16)
ANISOCYTOSIS BLD QL SMEAR: SLIGHT
ANISOCYTOSIS BLD QL SMEAR: SLIGHT
APTT BLDCRRT: 61.1 SEC (ref 21–32)
APTT BLDCRRT: 67.7 SEC (ref 21–32)
AST SERPL-CCNC: 17 U/L (ref 10–40)
BACTERIA #/AREA URNS AUTO: ABNORMAL /HPF
BASOPHILS # BLD AUTO: 0.01 K/UL (ref 0–0.2)
BASOPHILS # BLD AUTO: 0.01 K/UL (ref 0–0.2)
BASOPHILS NFR BLD: 0.1 % (ref 0–1.9)
BASOPHILS NFR BLD: 0.1 % (ref 0–1.9)
BILIRUB SERPL-MCNC: 1.4 MG/DL (ref 0.1–1)
BILIRUB UR QL STRIP: NEGATIVE
BNP SERPL-MCNC: 116 PG/ML (ref 0–99)
BUN SERPL-MCNC: 14 MG/DL (ref 6–30)
BUN SERPL-MCNC: 14 MG/DL (ref 8–23)
BURR CELLS BLD QL SMEAR: ABNORMAL
BURR CELLS BLD QL SMEAR: ABNORMAL
CALCIUM SERPL-MCNC: 8.2 MG/DL (ref 8.7–10.5)
CHLORIDE SERPL-SCNC: 105 MMOL/L (ref 95–110)
CHLORIDE SERPL-SCNC: 107 MMOL/L (ref 95–110)
CLARITY UR REFRACT.AUTO: ABNORMAL
CO2 SERPL-SCNC: 23 MMOL/L (ref 23–29)
COLOR UR AUTO: YELLOW
CREAT SERPL-MCNC: 0.9 MG/DL (ref 0.5–1.4)
CREAT SERPL-MCNC: 1 MG/DL (ref 0.5–1.4)
CRP SERPL-MCNC: 254.5 MG/L (ref 0–8.2)
CTP QC/QA: YES
DELSYS: ABNORMAL
DIFFERENTIAL METHOD: ABNORMAL
DIFFERENTIAL METHOD: ABNORMAL
EOSINOPHIL # BLD AUTO: 0.2 K/UL (ref 0–0.5)
EOSINOPHIL # BLD AUTO: 0.3 K/UL (ref 0–0.5)
EOSINOPHIL NFR BLD: 1 % (ref 0–8)
EOSINOPHIL NFR BLD: 2.4 % (ref 0–8)
ERYTHROCYTE [DISTWIDTH] IN BLOOD BY AUTOMATED COUNT: 18.1 % (ref 11.5–14.5)
ERYTHROCYTE [DISTWIDTH] IN BLOOD BY AUTOMATED COUNT: 18.4 % (ref 11.5–14.5)
ERYTHROCYTE [SEDIMENTATION RATE] IN BLOOD BY WESTERGREN METHOD: 54 MM/HR (ref 0–36)
EST. GFR  (AFRICAN AMERICAN): >60 ML/MIN/1.73 M^2
EST. GFR  (NON AFRICAN AMERICAN): 58.9 ML/MIN/1.73 M^2
FERRITIN SERPL-MCNC: 680 NG/ML (ref 20–300)
GLUCOSE SERPL-MCNC: 169 MG/DL (ref 70–110)
GLUCOSE SERPL-MCNC: 48 MG/DL (ref 70–110)
GLUCOSE SERPL-MCNC: 60 MG/DL (ref 70–110)
GLUCOSE SERPL-MCNC: 61 MG/DL (ref 70–110)
GLUCOSE UR QL STRIP: NEGATIVE
HCO3 UR-SCNC: 24.6 MMOL/L (ref 24–28)
HCT VFR BLD AUTO: 24.3 % (ref 37–48.5)
HCT VFR BLD AUTO: 25.2 % (ref 37–48.5)
HCT VFR BLD CALC: 24 %PCV (ref 36–54)
HGB BLD-MCNC: 7.2 G/DL (ref 12–16)
HGB BLD-MCNC: 7.5 G/DL (ref 12–16)
HGB UR QL STRIP: NEGATIVE
HYALINE CASTS UR QL AUTO: 0 /LPF
IMM GRANULOCYTES # BLD AUTO: 0.19 K/UL (ref 0–0.04)
IMM GRANULOCYTES # BLD AUTO: 0.23 K/UL (ref 0–0.04)
IMM GRANULOCYTES NFR BLD AUTO: 1.3 % (ref 0–0.5)
IMM GRANULOCYTES NFR BLD AUTO: 1.7 % (ref 0–0.5)
INR PPP: 1.4 (ref 0.8–1.2)
INR PPP: 1.4 (ref 0.8–1.2)
KETONES UR QL STRIP: ABNORMAL
LACTATE SERPL-SCNC: 1.2 MMOL/L (ref 0.5–2.2)
LDH SERPL L TO P-CCNC: 618 U/L (ref 110–260)
LEUKOCYTE ESTERASE UR QL STRIP: ABNORMAL
LYMPHOCYTES # BLD AUTO: 0.8 K/UL (ref 1–4.8)
LYMPHOCYTES # BLD AUTO: 1 K/UL (ref 1–4.8)
LYMPHOCYTES NFR BLD: 5.7 % (ref 18–48)
LYMPHOCYTES NFR BLD: 7 % (ref 18–48)
MCH RBC QN AUTO: 26.7 PG (ref 27–31)
MCH RBC QN AUTO: 26.9 PG (ref 27–31)
MCHC RBC AUTO-ENTMCNC: 29.6 G/DL (ref 32–36)
MCHC RBC AUTO-ENTMCNC: 29.8 G/DL (ref 32–36)
MCV RBC AUTO: 90 FL (ref 82–98)
MCV RBC AUTO: 91 FL (ref 82–98)
MICROSCOPIC COMMENT: ABNORMAL
MODE: ABNORMAL
MONOCYTES # BLD AUTO: 2.8 K/UL (ref 0.3–1)
MONOCYTES # BLD AUTO: 3.3 K/UL (ref 0.3–1)
MONOCYTES NFR BLD: 20.8 % (ref 4–15)
MONOCYTES NFR BLD: 22.6 % (ref 4–15)
NEUTROPHILS # BLD AUTO: 10 K/UL (ref 1.8–7.7)
NEUTROPHILS # BLD AUTO: 9.2 K/UL (ref 1.8–7.7)
NEUTROPHILS NFR BLD: 68 % (ref 38–73)
NEUTROPHILS NFR BLD: 69.3 % (ref 38–73)
NITRITE UR QL STRIP: NEGATIVE
NRBC BLD-RTO: 0 /100 WBC
NRBC BLD-RTO: 0 /100 WBC
OVALOCYTES BLD QL SMEAR: ABNORMAL
PCO2 BLDA: 36.7 MMHG (ref 35–45)
PH SMN: 7.43 [PH] (ref 7.35–7.45)
PH UR STRIP: 5 [PH] (ref 5–8)
PLATELET # BLD AUTO: 189 K/UL (ref 150–450)
PLATELET # BLD AUTO: 190 K/UL (ref 150–450)
PMV BLD AUTO: 10.6 FL (ref 9.2–12.9)
PMV BLD AUTO: 10.8 FL (ref 9.2–12.9)
PO2 BLDA: 21 MMHG (ref 40–60)
POC BE: 0 MMOL/L
POC IONIZED CALCIUM: 1.08 MMOL/L (ref 1.06–1.42)
POC SATURATED O2: 36 % (ref 95–100)
POC TCO2 (MEASURED): 23 MMOL/L (ref 23–29)
POC TCO2: 26 MMOL/L (ref 24–29)
POCT GLUCOSE: 127 MG/DL (ref 70–110)
POCT GLUCOSE: 169 MG/DL (ref 70–110)
POCT GLUCOSE: 48 MG/DL (ref 70–110)
POIKILOCYTOSIS BLD QL SMEAR: ABNORMAL
POIKILOCYTOSIS BLD QL SMEAR: SLIGHT
POLYCHROMASIA BLD QL SMEAR: ABNORMAL
POLYCHROMASIA BLD QL SMEAR: ABNORMAL
POTASSIUM BLD-SCNC: 4.5 MMOL/L (ref 3.5–5.1)
POTASSIUM SERPL-SCNC: 4.6 MMOL/L (ref 3.5–5.1)
PROCALCITONIN SERPL IA-MCNC: 1.68 NG/ML
PROT SERPL-MCNC: 5.3 G/DL (ref 6–8.4)
PROT UR QL STRIP: ABNORMAL
PROTHROMBIN TIME: 14.6 SEC (ref 9–12.5)
PROTHROMBIN TIME: 15 SEC (ref 9–12.5)
RBC # BLD AUTO: 2.68 M/UL (ref 4–5.4)
RBC # BLD AUTO: 2.81 M/UL (ref 4–5.4)
RBC #/AREA URNS AUTO: 0 /HPF (ref 0–4)
SAMPLE: ABNORMAL
SAMPLE: ABNORMAL
SARS-COV-2 RDRP RESP QL NAA+PROBE: POSITIVE
SCHISTOCYTES BLD QL SMEAR: ABNORMAL
SCHISTOCYTES BLD QL SMEAR: ABNORMAL
SITE: ABNORMAL
SODIUM BLD-SCNC: 139 MMOL/L (ref 136–145)
SODIUM SERPL-SCNC: 140 MMOL/L (ref 136–145)
SP GR UR STRIP: 1.02 (ref 1–1.03)
SPHEROCYTES BLD QL SMEAR: ABNORMAL
TROPONIN I SERPL DL<=0.01 NG/ML-MCNC: 0.02 NG/ML (ref 0–0.03)
URN SPEC COLLECT METH UR: ABNORMAL
WBC # BLD AUTO: 13.48 K/UL (ref 3.9–12.7)
WBC # BLD AUTO: 14.49 K/UL (ref 3.9–12.7)
WBC #/AREA URNS AUTO: >100 /HPF (ref 0–5)
WBC CLUMPS UR QL AUTO: ABNORMAL

## 2022-01-23 PROCEDURE — 83615 LACTATE (LD) (LDH) ENZYME: CPT | Performed by: EMERGENCY MEDICINE

## 2022-01-23 PROCEDURE — 85730 THROMBOPLASTIN TIME PARTIAL: CPT | Mod: 91

## 2022-01-23 PROCEDURE — 96375 TX/PRO/DX INJ NEW DRUG ADDON: CPT

## 2022-01-23 PROCEDURE — 63600175 PHARM REV CODE 636 W HCPCS

## 2022-01-23 PROCEDURE — 99291 CRITICAL CARE FIRST HOUR: CPT

## 2022-01-23 PROCEDURE — 96366 THER/PROPH/DIAG IV INF ADDON: CPT

## 2022-01-23 PROCEDURE — 85025 COMPLETE CBC W/AUTO DIFF WBC: CPT | Performed by: EMERGENCY MEDICINE

## 2022-01-23 PROCEDURE — 87086 URINE CULTURE/COLONY COUNT: CPT | Performed by: EMERGENCY MEDICINE

## 2022-01-23 PROCEDURE — 87077 CULTURE AEROBIC IDENTIFY: CPT | Mod: 59 | Performed by: EMERGENCY MEDICINE

## 2022-01-23 PROCEDURE — 99900035 HC TECH TIME PER 15 MIN (STAT)

## 2022-01-23 PROCEDURE — 93010 EKG 12-LEAD: ICD-10-PCS | Mod: ,,, | Performed by: INTERNAL MEDICINE

## 2022-01-23 PROCEDURE — 63600175 PHARM REV CODE 636 W HCPCS: Performed by: EMERGENCY MEDICINE

## 2022-01-23 PROCEDURE — 84484 ASSAY OF TROPONIN QUANT: CPT | Performed by: EMERGENCY MEDICINE

## 2022-01-23 PROCEDURE — 93010 ELECTROCARDIOGRAM REPORT: CPT | Mod: ,,, | Performed by: INTERNAL MEDICINE

## 2022-01-23 PROCEDURE — 96367 TX/PROPH/DG ADDL SEQ IV INF: CPT

## 2022-01-23 PROCEDURE — 81001 URINALYSIS AUTO W/SCOPE: CPT | Performed by: EMERGENCY MEDICINE

## 2022-01-23 PROCEDURE — 20000000 HC ICU ROOM

## 2022-01-23 PROCEDURE — 83605 ASSAY OF LACTIC ACID: CPT | Performed by: EMERGENCY MEDICINE

## 2022-01-23 PROCEDURE — 99291 CRITICAL CARE FIRST HOUR: CPT | Mod: CR,CS,, | Performed by: EMERGENCY MEDICINE

## 2022-01-23 PROCEDURE — 85610 PROTHROMBIN TIME: CPT | Performed by: EMERGENCY MEDICINE

## 2022-01-23 PROCEDURE — 85730 THROMBOPLASTIN TIME PARTIAL: CPT | Performed by: EMERGENCY MEDICINE

## 2022-01-23 PROCEDURE — 80047 BASIC METABLC PNL IONIZED CA: CPT

## 2022-01-23 PROCEDURE — 83880 ASSAY OF NATRIURETIC PEPTIDE: CPT | Performed by: EMERGENCY MEDICINE

## 2022-01-23 PROCEDURE — 84145 PROCALCITONIN (PCT): CPT | Performed by: EMERGENCY MEDICINE

## 2022-01-23 PROCEDURE — 27000207 HC ISOLATION

## 2022-01-23 PROCEDURE — 25000242 PHARM REV CODE 250 ALT 637 W/ HCPCS: Performed by: EMERGENCY MEDICINE

## 2022-01-23 PROCEDURE — 82962 GLUCOSE BLOOD TEST: CPT | Mod: 59

## 2022-01-23 PROCEDURE — 99291 PR CRITICAL CARE, E/M 30-74 MINUTES: ICD-10-PCS | Mod: CR,CS,, | Performed by: EMERGENCY MEDICINE

## 2022-01-23 PROCEDURE — 80053 COMPREHEN METABOLIC PANEL: CPT | Performed by: EMERGENCY MEDICINE

## 2022-01-23 PROCEDURE — 25000003 PHARM REV CODE 250: Performed by: EMERGENCY MEDICINE

## 2022-01-23 PROCEDURE — 93005 ELECTROCARDIOGRAM TRACING: CPT

## 2022-01-23 PROCEDURE — 85610 PROTHROMBIN TIME: CPT | Mod: 91

## 2022-01-23 PROCEDURE — 87186 SC STD MICRODIL/AGAR DIL: CPT | Mod: 59 | Performed by: EMERGENCY MEDICINE

## 2022-01-23 PROCEDURE — 25000003 PHARM REV CODE 250

## 2022-01-23 PROCEDURE — 87088 URINE BACTERIA CULTURE: CPT | Performed by: EMERGENCY MEDICINE

## 2022-01-23 PROCEDURE — 96365 THER/PROPH/DIAG IV INF INIT: CPT

## 2022-01-23 PROCEDURE — 87040 BLOOD CULTURE FOR BACTERIA: CPT | Mod: 59 | Performed by: EMERGENCY MEDICINE

## 2022-01-23 PROCEDURE — 85652 RBC SED RATE AUTOMATED: CPT | Performed by: EMERGENCY MEDICINE

## 2022-01-23 PROCEDURE — 85025 COMPLETE CBC W/AUTO DIFF WBC: CPT | Mod: 91

## 2022-01-23 PROCEDURE — U0002 COVID-19 LAB TEST NON-CDC: HCPCS | Performed by: EMERGENCY MEDICINE

## 2022-01-23 PROCEDURE — 82728 ASSAY OF FERRITIN: CPT | Performed by: EMERGENCY MEDICINE

## 2022-01-23 PROCEDURE — 86140 C-REACTIVE PROTEIN: CPT | Performed by: EMERGENCY MEDICINE

## 2022-01-23 PROCEDURE — 82803 BLOOD GASES ANY COMBINATION: CPT

## 2022-01-23 RX ORDER — HEPARIN SODIUM 5000 [USP'U]/ML
5000 INJECTION, SOLUTION INTRAVENOUS; SUBCUTANEOUS EVERY 8 HOURS
Status: DISCONTINUED | OUTPATIENT
Start: 2022-01-23 | End: 2022-01-23

## 2022-01-23 RX ORDER — FUROSEMIDE 10 MG/ML
20 INJECTION INTRAMUSCULAR; INTRAVENOUS DAILY
Status: DISCONTINUED | OUTPATIENT
Start: 2022-01-24 | End: 2022-01-28

## 2022-01-23 RX ORDER — POTASSIUM CHLORIDE 7.45 MG/ML
40 INJECTION INTRAVENOUS
Status: DISCONTINUED | OUTPATIENT
Start: 2022-01-23 | End: 2022-01-27

## 2022-01-23 RX ORDER — DEXTROSE 50 % IN WATER (D50W) INTRAVENOUS SYRINGE
25
Status: COMPLETED | OUTPATIENT
Start: 2022-01-23 | End: 2022-01-23

## 2022-01-23 RX ORDER — IPRATROPIUM BROMIDE AND ALBUTEROL SULFATE 2.5; .5 MG/3ML; MG/3ML
3 SOLUTION RESPIRATORY (INHALATION)
Status: DISCONTINUED | OUTPATIENT
Start: 2022-01-23 | End: 2022-01-23

## 2022-01-23 RX ORDER — PENTAMIDINE ISETHIONATE 300 MG/300MG
300 INHALANT RESPIRATORY (INHALATION) ONCE
Status: DISCONTINUED | OUTPATIENT
Start: 2022-01-23 | End: 2022-01-23

## 2022-01-23 RX ORDER — FAMOTIDINE 10 MG/ML
20 INJECTION INTRAVENOUS DAILY
Status: DISCONTINUED | OUTPATIENT
Start: 2022-01-23 | End: 2022-01-24

## 2022-01-23 RX ORDER — MAGNESIUM SULFATE HEPTAHYDRATE 40 MG/ML
2 INJECTION, SOLUTION INTRAVENOUS
Status: DISCONTINUED | OUTPATIENT
Start: 2022-01-23 | End: 2022-01-27

## 2022-01-23 RX ORDER — POTASSIUM CHLORIDE 7.45 MG/ML
40 INJECTION INTRAVENOUS
Status: DISCONTINUED | OUTPATIENT
Start: 2022-01-23 | End: 2022-02-08 | Stop reason: HOSPADM

## 2022-01-23 RX ORDER — ALBUTEROL SULFATE 90 UG/1
2 AEROSOL, METERED RESPIRATORY (INHALATION) EVERY 6 HOURS PRN
Status: DISCONTINUED | OUTPATIENT
Start: 2022-01-23 | End: 2022-02-08 | Stop reason: HOSPADM

## 2022-01-23 RX ORDER — SODIUM CHLORIDE 0.9 % (FLUSH) 0.9 %
10 SYRINGE (ML) INJECTION
Status: DISCONTINUED | OUTPATIENT
Start: 2022-01-23 | End: 2022-02-08 | Stop reason: HOSPADM

## 2022-01-23 RX ORDER — SODIUM CHLORIDE 0.9 % (FLUSH) 0.9 %
10 SYRINGE (ML) INJECTION
Status: DISCONTINUED | OUTPATIENT
Start: 2022-01-23 | End: 2022-01-23

## 2022-01-23 RX ORDER — HEPARIN SODIUM,PORCINE/D5W 25000/250
0-40 INTRAVENOUS SOLUTION INTRAVENOUS CONTINUOUS
Status: DISCONTINUED | OUTPATIENT
Start: 2022-01-23 | End: 2022-01-30

## 2022-01-23 RX ORDER — MAGNESIUM SULFATE HEPTAHYDRATE 40 MG/ML
4 INJECTION, SOLUTION INTRAVENOUS
Status: DISCONTINUED | OUTPATIENT
Start: 2022-01-23 | End: 2022-01-27

## 2022-01-23 RX ORDER — ALBUTEROL SULFATE 90 UG/1
4 AEROSOL, METERED RESPIRATORY (INHALATION)
Status: COMPLETED | OUTPATIENT
Start: 2022-01-23 | End: 2022-01-23

## 2022-01-23 RX ORDER — DEXAMETHASONE SODIUM PHOSPHATE 4 MG/ML
6 INJECTION, SOLUTION INTRA-ARTICULAR; INTRALESIONAL; INTRAMUSCULAR; INTRAVENOUS; SOFT TISSUE EVERY 24 HOURS
Status: DISPENSED | OUTPATIENT
Start: 2022-01-23 | End: 2022-02-02

## 2022-01-23 RX ADMIN — HEPARIN SODIUM 12 UNITS/KG/HR: 1000 INJECTION INTRAVENOUS; SUBCUTANEOUS at 08:01

## 2022-01-23 RX ADMIN — DEXAMETHASONE SODIUM PHOSPHATE 6 MG: 4 INJECTION INTRA-ARTICULAR; INTRALESIONAL; INTRAMUSCULAR; INTRAVENOUS; SOFT TISSUE at 05:01

## 2022-01-23 RX ADMIN — REMDESIVIR 200 MG: 100 INJECTION, POWDER, LYOPHILIZED, FOR SOLUTION INTRAVENOUS at 07:01

## 2022-01-23 RX ADMIN — HEPARIN SODIUM 12 UNITS/KG/HR: 1000 INJECTION INTRAVENOUS; SUBCUTANEOUS at 10:01

## 2022-01-23 RX ADMIN — VANCOMYCIN HYDROCHLORIDE 2000 MG: 500 INJECTION, POWDER, LYOPHILIZED, FOR SOLUTION INTRAVENOUS at 11:01

## 2022-01-23 RX ADMIN — ALBUTEROL SULFATE 4 PUFF: 108 INHALANT RESPIRATORY (INHALATION) at 11:01

## 2022-01-23 RX ADMIN — DEXTROSE MONOHYDRATE 25 G: 25 INJECTION, SOLUTION INTRAVENOUS at 11:01

## 2022-01-23 RX ADMIN — HEPARIN SODIUM 5000 UNITS: 5000 INJECTION INTRAVENOUS; SUBCUTANEOUS at 02:01

## 2022-01-23 RX ADMIN — PIPERACILLIN AND TAZOBACTAM 4.5 G: 4; .5 INJECTION, POWDER, LYOPHILIZED, FOR SOLUTION INTRAVENOUS; PARENTERAL at 11:01

## 2022-01-23 RX ADMIN — FAMOTIDINE 20 MG: 10 INJECTION INTRAVENOUS at 02:01

## 2022-01-23 NOTE — ED NOTES
Pt resting comfortably and independently repositioned in stretcher with bed locked in lowest position for safety. NAD noted at this time. Patient offered bathroom assistance and denies need at this time. Pt clean and dry. Pt instructed to call if assistance is needed. Pt on continuous cardiac, BP, and O2 monitoring. No needs at this time. Will continue to monitor.

## 2022-01-23 NOTE — HPI
Selma Lux is a 84 y.o. 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  She has had encephalopathy during her hospital stay and admission to rehab. She tested positive for COVID yesterday.  Paramedics state that she has been having very poor oral intake.     Collateral information from her daughter.  She notes that she has had difficulty breathing for several months.  She states she has cryptogenic pneumonia and received steroids and breathing treatments for this. 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  She noted that she had been on the ventilator and really hated being on the ventilator.  However, she would want to be intubated if her life depended on it.  She would want to try everything possible to avoid intubation.  She noted that she tested positive for COVID yesterday along with her mother.  She states that she is not able to come see her mother due to coronavirus.  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    ED course: Patient with positive COVID test and respiratory distress. This seems to be a recurrent problem with her history of cryptogenic organizing pneumonia. Prior chest x-rays seems to have had a similar appearance to the x-ray from today.  Diffuse infiltrates.  This resolved on recent chest x-rays from her admission.  Of note this recent admission is under a different registration under the same name in epic.   Her venous blood gas does not show respiratory failure or CO2  retention. She seems to have encephalopathy but this has been an ongoing issue. Her sodium today is normalized. Patient has markedly infected urine and an elevated white count.  ED started broad-spectrum IV antibiotics.    CT Head 1/23: Mixed density extra-axial collection overlies the right cerebral convexity overall reduced in size from prior compatible with evolving subacute subdural hemorrhage.  No significant new hemorrhage. no definite parenchymal hemorrhage or sulcal effacement to suggest large territory recent infarction.    CT chest 1/23: Interval development of patchy consolidation in both lungs with a bibasilar predominance along with a small right pleural effusion.  Findings most consistent with multifocal pneumonia.  Follow-up to resolution advised. Thyroid gland is diffusely enlarged.  Nodule on the right is stable in size resulting in leftward deviation and mass effect upon the airway.

## 2022-01-23 NOTE — ED NOTES
MD at bedside. Pt resting comfortably and independently repositioned in stretcher with bed locked in lowest position for safety. NAD noted at this time. Pt on continuous cardiac, BP, and O2 monitoring. No needs at this time. Will continue to monitor.

## 2022-01-23 NOTE — ED PROVIDER NOTES
Encounter Date: 1/23/2022       History     Chief Complaint   Patient presents with    Shortness of Breath     From [facility]. Has had productive cough x1 week. Tested covid pos yesterday. Slow decline in mental status x1 week per EMS. Oxygen sat 88% RA, 's, temp 100.1 per rehab staff. Was at rehab for CVA; left side weakness from CVA.     86-year-old female with history of recent subdural hematoma that was treated non operatively complicated by a PEA cardiac arrest and hypo natremia presents with a productive cough for 1 week with associated fevers.  She has had encephalopathy during her hospital stay and admission to rehab.  She has been at oxygen [facility].  She tested positive for COVID yesterday.  Paramedics state that she has been having very poor oral intake    I spoke with her daughter.  She notes that she has had difficulty breathing for several months.  She states she has cryptogenic pneumonia and received steroids and breathing treatments for this. 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  She noted that she had been on the ventilator and really hated being on the ventilator.  However, she would want to be intubated if her life depended on it.  She would want to try everything possible to avoid intubation.  She noted that she tested positive for COVID yesterday along with her mother.  She states that she is not able to come see her mother due to coronavirus.  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        Review of patient's allergies indicates:  No Known Allergies  Past Medical History:   Diagnosis Date    Acute cystitis without hematuria 2/27/2020    Acute hypoxemic respiratory failure 4/11/2018    Acute respiratory failure with hypoxia 3/2/2020    Altered mental status     Anemia     Arthritis     Bilateral hand pain  3/14/2018    Branch retinal vein occlusion, left eye 2/20/2015    Chronic bilateral low back pain without sciatica 3/23/2017    Chronic renal failure in pediatric patient, stage 3 (moderate) 4/15/2018    Cognitive communication deficit 12/19/2017    Cystoid macular edema, left eye 2/20/2015    Cystoid macular edema, left eye 2/20/2015    DJD (degenerative joint disease) of cervical spine 5/15/2013    Fatty liver 8/26/2014    Goiter 4/9/2018    Hashimoto's disease     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    Hypertension     Hypertensive encephalopathy     IBS (irritable bowel syndrome) 6/21/2017    IGT (impaired glucose tolerance) 1/12/2016    Iron deficiency anemia secondary to inadequate dietary iron intake 6/24/2013    Joint pain     Keratoconjunctivitis sicca of both eyes not specified as Sjogren's 7/29/2016    Leiomyoma of uterus, unspecified 9/16/2014    Long QT interval 6/29/2016    Due to medication (plaquenil)     Macular edema 1/12/2015    Multinodular goiter 1/12/2016    Neuropathy 8/23/2017    Plaquenil causing adverse effect in therapeutic use 10/7/2016    Pneumococcal vaccine refused 8/17/2016    Pneumonia due to Streptococcus pneumoniae 4/5/2018    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/2015    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    s/p Right Total knee replacement 5/15/2013    Scoliosis of thoracic spine 5/15/2013    Small vessel disease, cerebrovascular 12/28/2017    Stroke     UTI (urinary tract infection) 12/29/2018    Vascular dementia 12/6/2017     Past Surgical History:   Procedure Laterality Date    BREAST CYST EXCISION      CATARACT EXTRACTION      COLONOSCOPY N/A 9/29/2015    Procedure: COLONOSCOPY;  Surgeon: FIDELINA Sanchez MD;  Location: [address]);  Service: Endoscopy;  Laterality: N/A;    EYE SURGERY      INJECTION OF ANESTHETIC AGENT AROUND NERVE Left 4/19/2021    Procedure: BLOCK, NERVE, FEMORAL AND OBTURATOR;  Surgeon: Shivam  MD Carlos;  Location: [facility] PAIN [facility]GT;  Service: Pain Management;  Laterality: Left;  consent needed    JOINT REPLACEMENT      right knee    KNEE SURGERY Left 12/31/2013    TKR    left parotidectomy      mixed tumor    IA EVAL,SWALLOW FUNCTION,CINE/VIDEO RECORD  6/5/2021         SALIVARY GLAND SURGERY      TONSILLECTOMY      UPPER GASTROINTESTINAL ENDOSCOPY       Family History   Problem Relation Age of Onset    Hypertension Mother     Heart disease Mother     Hyperthyroidism Mother     Prostate cancer Father         prostate cancer    Cancer Father     Breast cancer Maternal Grandmother     Hyperthyroidism Other     Colon cancer Neg Hx      Social History     Tobacco Use    Smoking status: Never Smoker    Smokeless tobacco: Never Used   Substance Use Topics    Alcohol use: Yes     Alcohol/week: 0.0 standard drinks     Comment: very seldom     Drug use: No     Review of Systems   Unable to perform ROS: Acuity of condition       Physical Exam     Initial Vitals   BP Pulse Resp Temp SpO2   -- -- -- -- --      MAP       --         Physical Exam    Constitutional:   Patient with labored breathing.  She appears chronically ill.  She opens her eyes and follows commands but is not conversant.   HENT:   Poor dental hygiene   Eyes: Conjunctivae are normal.   Neck: Neck supple.   Normal range of motion.  Cardiovascular:   Tachycardic   Pulmonary/Chest:   Coarse breath sounds with wheezing   Abdominal: Abdomen is soft. Bowel sounds are normal. She exhibits no distension. There is no abdominal tenderness.   Musculoskeletal:      Cervical back: Normal range of motion and neck supple.     Neurological:   Patient is somnolent but arousable.           ED Course   Procedures  Labs Reviewed   CULTURE, BLOOD   CULTURE, BLOOD   CBC W/ AUTO DIFFERENTIAL   COMPREHENSIVE METABOLIC PANEL   URINALYSIS, REFLEX TO URINE CULTURE   LACTIC ACID, PLASMA   PROTIME-INR   APTT   TROPONIN I   B-TYPE NATRIURETIC PEPTIDE   SARS-COV-2  RDRP GENE   POCT GLUCOSE MONITORING CONTINUOUS   ISTAT CHEM8          Imaging Results    None          Medications - No data to display  Medical Decision Making:   Initial Assessment:   Patient with positive COVID test and respiratory distress.   This seems to be a recurrent problem with her history of cryptogenic organizing pneumonia.   I reviewed prior chest x-rays and she seems to have had a similar appearance to the x-ray from today.  Diffuse infiltrates.  This resolved on recent chest x-rays from her admission.  Of note this recent admission is under a different registration under the same name in epic.   Her venous blood gas does not show respiratory failure or CO2 retention.      She seems to have encephalopathy but this has been an ongoing issue to her sodium here is normalized.  Will need repeat brain imaging but will wait for critical care evaluation in case they want further CTs    Patient has markedly infected urine and an elevated white count.  Will cover broad-spectrum IV antibiotics    Consult Critical Care at 11:20 a.m. after some data had come back            Attending Attestation:         Attending Critical Care:   Critical Care Times:   Direct Patient Care (initial evaluation, reassessments, and time considering the case)................................................................24 minutes.   Additional History from reviewing old medical records or taking additional history from the family, EMS, PCP, etc.......................4 minutes.   Ordering, Reviewing, and Interpreting Diagnostic Studies...............................................................................................................4 minutes.   Documentation..................................................................................................................................................................................4 minutes.   Consultation with other Physicians.  .................................................................................................................................................4 minutes.   Consultation with the patient's family directly relating to the patient's condition, care, and DNR status (when patient unable)......7 minutes.   ==============================================================  · Total Critical Care Time - exclusive of procedural time: 47 minutes.  ==============================================================                   Clinical Impression:   Final diagnoses:  [R06.00] Dyspnea                 Cristian Cabrera MD  01/24/22 5004

## 2022-01-23 NOTE — ED NOTES
Pt resting comfortably and independently repositioned in stretcher with bed locked in lowest position for safety. NAD noted at this time. Respirations even and unlabored and visible chest rise noted.  Patient offered bathroom assistance and denies need at this time. Pt on continuous cardiac, BP, and O2 monitoring. No needs at this time. Will continue to monitor.

## 2022-01-23 NOTE — ED NOTES
Pt resting comfortably and independently repositioned in stretcher with bed locked in lowest position for safety. NAD noted at this time. Pt instructed to call if assistance is needed. Pt on continuous cardiac, BP, and O2 monitoring. Call light within reach. No needs at this time. Will continue to monitor.

## 2022-01-23 NOTE — CONSULTS
Consult acknowledged. Patient is seen and exam. She is in respiratory distress on 8L, given her history of , CVA and current infection of COVID19, anticipate advanced respiratory support. Will admit patient to MICU for further management.

## 2022-01-23 NOTE — ED TRIAGE NOTES
Patient presents to ER for further evaluation of shortness of breath and productive cough x one week. EMS reports that patient also with declining mental status x one week. Patient with recent CVA in December. Patient presents disoriented and unable to answer questions appropriately.

## 2022-01-23 NOTE — ED NOTES
Incontinence care provided per RN x 2, mepilex border applied to patient sacrum. Patient with excoriation noted to bilateral patient gluteals and perineum, barrier cream applied. Wedge placed under patient right side to alleviate pressure. Patient with pressure reduction boots on bilaterally.

## 2022-01-23 NOTE — ED NOTES
Pt transported to and from CT by RN. NAD. Pt more awake and alert. Able to recall name, , year, and place. Speech more clear but still garbled. ICU MD at bedside.

## 2022-01-23 NOTE — SUBJECTIVE & OBJECTIVE
Past Medical History:   Diagnosis Date    Acute cystitis without hematuria 2/27/2020    Acute hypoxemic respiratory failure 4/11/2018    Acute respiratory failure with hypoxia 3/2/2020    Altered mental status     Anemia     Arthritis     Bilateral hand pain 3/14/2018    Branch retinal vein occlusion, left eye 2/20/2015    Chronic bilateral low back pain without sciatica 3/23/2017    Chronic renal failure in pediatric patient, stage 3 (moderate) 4/15/2018    Cognitive communication deficit 12/19/2017    Cystoid macular edema, left eye 2/20/2015    Cystoid macular edema, left eye 2/20/2015    DJD (degenerative joint disease) of cervical spine 5/15/2013    Fatty liver 8/26/2014    Goiter 4/9/2018    Hashimoto's disease     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    Hypertension     Hypertensive encephalopathy     IBS (irritable bowel syndrome) 6/21/2017    IGT (impaired glucose tolerance) 1/12/2016    Iron deficiency anemia secondary to inadequate dietary iron intake 6/24/2013    Joint pain     Keratoconjunctivitis sicca of both eyes not specified as Sjogren's 7/29/2016    Leiomyoma of uterus, unspecified 9/16/2014    Long QT interval 6/29/2016    Due to medication (plaquenil)     Macular edema 1/12/2015    Multinodular goiter 1/12/2016    Neuropathy 8/23/2017    Plaquenil causing adverse effect in therapeutic use 10/7/2016    Pneumococcal vaccine refused 8/17/2016    Pneumonia due to Streptococcus pneumoniae 4/5/2018    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/2015    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    s/p Right Total knee replacement 5/15/2013    Scoliosis of thoracic spine 5/15/2013    Small vessel disease, cerebrovascular 12/28/2017    Stroke     UTI (urinary tract infection) 12/29/2018    Vascular dementia 12/6/2017       Past Surgical History:   Procedure Laterality Date    BREAST CYST EXCISION      CATARACT EXTRACTION      COLONOSCOPY N/A 9/29/2015     Procedure: COLONOSCOPY;  Surgeon: FIDELINA Sanchez MD;  Location: [facility] ENDO (4TH FLR);  Service: Endoscopy;  Laterality: N/A;    EYE SURGERY      INJECTION OF ANESTHETIC AGENT AROUND NERVE Left 4/19/2021    Procedure: BLOCK, NERVE, FEMORAL AND OBTURATOR;  Surgeon: Shivam Gonzalez MD;  Location: [facility] PAIN [facility]GT;  Service: Pain Management;  Laterality: Left;  consent needed    JOINT REPLACEMENT      right knee    KNEE SURGERY Left 12/31/2013    TKR    left parotidectomy      mixed tumor    NM EVAL,SWALLOW FUNCTION,CINE/VIDEO RECORD  6/5/2021         SALIVARY GLAND SURGERY      TONSILLECTOMY      UPPER GASTROINTESTINAL ENDOSCOPY         Review of patient's allergies indicates:  No Known Allergies    Family History     Problem Relation (Age of Onset)    Breast cancer Maternal Grandmother    Cancer Father    Heart disease Mother    Hypertension Mother    Hyperthyroidism Mother, Other    Prostate cancer Father        Tobacco Use    Smoking status: Never Smoker    Smokeless tobacco: Never Used   Substance and Sexual Activity    Alcohol use: Yes     Alcohol/week: 0.0 standard drinks     Comment: very seldom     Drug use: No    Sexual activity: Not Currently     Comment: ,  age 52,       Review of Systems   Unable to perform ROS: Mental status change     Objective:     Vital Signs (Most Recent):  Temp: 99.32 °F (37.4 °C) (01/23/22 1315)  Pulse: (!) 112 (01/23/22 1315)  Resp: (!) 24 (01/23/22 1315)  BP: 116/63 (01/23/22 1315)  SpO2: 95 % (01/23/22 1315) Vital Signs (24h Range):  Temp:  [99.32 °F (37.4 °C)-100.1 °F (37.8 °C)] 99.32 °F (37.4 °C)  Pulse:  [112-128] 112  Resp:  [24-30] 24  SpO2:  [93 %-99 %] 95 %  BP: ()/(53-63) 116/63   Weight: 68.5 kg (151 lb)  Body mass index is 25.92 kg/m².      Intake/Output Summary (Last 24 hours) at 1/23/2022 1452  Last data filed at 1/23/2022 1401  Gross per 24 hour   Intake 565.31 ml   Output --   Net 565.31 ml       Physical Exam  Vitals and nursing note  reviewed.   Constitutional:       Comments: somnolent but easy to arouse, respiratory distress.   HENT:      Head: Normocephalic and atraumatic.      Nose: Congestion present.      Mouth/Throat:      Mouth: Mucous membranes are dry.      Pharynx: Oropharynx is clear. No oropharyngeal exudate.      Comments: No drooling, no stridor   Eyes:      Extraocular Movements: Extraocular movements intact.      Conjunctiva/sclera: Conjunctivae normal.      Pupils: Pupils are equal, round, and reactive to light.      Comments: Eyes close, pin point pupil    Neck:      Comments: No JVD, no palpable mass  Cardiovascular:      Rate and Rhythm: Regular rhythm. Tachycardia present.   Pulmonary:      Effort: Respiratory distress present.      Breath sounds: Rhonchi and rales present.   Abdominal:      General: Abdomen is flat.      Palpations: Abdomen is soft.   Musculoskeletal:         General: No swelling or deformity.      Cervical back: Rigidity present.      Comments: BUE 3+ strength, neurovascular intact   BLE 1+ strength, neurovascular intact   Skin:     General: Skin is warm and dry.      Capillary Refill: Capillary refill takes less than 2 seconds.   Neurological:      Mental Status: She is disoriented.      Sensory: No sensory deficit.      Motor: Weakness (BLE) present.      Comments: Oriented to self and place only         Vents:     Lines/Drains/Airways     Drain                 Urethral Catheter 01/23/22 1037 Double-lumen;Temperature probe 16 Fr. <1 day          Peripheral Intravenous Line                 Peripheral IV - Single Lumen 01/23/22 0000 18 G Right Antecubital <1 day         Peripheral IV - Single Lumen 01/23/22 1030 20 G Right Shoulder <1 day              Significant Labs:    CBC/Anemia Profile:  Recent Labs   Lab 01/23/22  1023 01/23/22  1051 01/23/22  1100   WBC 13.48*  --   --    HGB 7.5*  --   --    HCT 25.2*  --  24*     --   --    MCV 90  --   --    RDW 18.1*  --   --    FERRITIN  --  680*  --          Chemistries:  Recent Labs   Lab 01/23/22  1023      K 4.6      CO2 23   BUN 14   CREATININE 0.9   CALCIUM 8.2*   ALBUMIN 2.2*   PROT 5.3*   BILITOT 1.4*   ALKPHOS 105   ALT 13   AST 17       All pertinent labs within the past 24 hours have been reviewed.    Significant Imaging: I have reviewed all pertinent imaging results/findings within the past 24 hours.

## 2022-01-23 NOTE — ED NOTES
[place] placed under pt's head to keep midline. MD aware of all abnormal VS. Pt resting comfortably and independently repositioned in stretcher with bed locked in lowest position for safety. NAD noted at this time. Pt on continuous cardiac, BP, and O2 monitoring. No needs at this time. Will continue to monitor.

## 2022-01-24 LAB
ABO + RH BLD: NORMAL
ALBUMIN SERPL BCP-MCNC: 2 G/DL (ref 3.5–5.2)
ALP SERPL-CCNC: 96 U/L (ref 55–135)
ALT SERPL W/O P-5'-P-CCNC: 11 U/L (ref 10–44)
ANION GAP SERPL CALC-SCNC: 11 MMOL/L (ref 8–16)
APTT BLDCRRT: 127.7 SEC (ref 21–32)
APTT BLDCRRT: 58.7 SEC (ref 21–32)
APTT BLDCRRT: 60 SEC (ref 21–32)
APTT BLDCRRT: 84.1 SEC (ref 21–32)
ASCENDING AORTA: 3.21 CM
AST SERPL-CCNC: 15 U/L (ref 10–40)
AV INDEX (PROSTH): 0.58
AV MEAN GRADIENT: 5 MMHG
AV PEAK GRADIENT: 9 MMHG
AV VALVE AREA: 1.8 CM2
AV VELOCITY RATIO: 0.61
BASOPHILS # BLD AUTO: 0.01 K/UL (ref 0–0.2)
BASOPHILS # BLD AUTO: 0.01 K/UL (ref 0–0.2)
BASOPHILS NFR BLD: 0.1 % (ref 0–1.9)
BASOPHILS NFR BLD: 0.1 % (ref 0–1.9)
BILIRUB SERPL-MCNC: 0.9 MG/DL (ref 0.1–1)
BLD GP AB SCN CELLS X3 SERPL QL: NORMAL
BLD PROD TYP BPU: NORMAL
BLOOD UNIT EXPIRATION DATE: NORMAL
BLOOD UNIT TYPE CODE: 5100
BLOOD UNIT TYPE: NORMAL
BSA FOR ECHO PROCEDURE: 1.76 M2
BUN SERPL-MCNC: 19 MG/DL (ref 8–23)
CALCIUM SERPL-MCNC: 7.7 MG/DL (ref 8.7–10.5)
CHLORIDE SERPL-SCNC: 107 MMOL/L (ref 95–110)
CO2 SERPL-SCNC: 20 MMOL/L (ref 23–29)
CODING SYSTEM: NORMAL
CREAT SERPL-MCNC: 1 MG/DL (ref 0.5–1.4)
CV ECHO LV RWT: 0.46 CM
DIFFERENTIAL METHOD: ABNORMAL
DIFFERENTIAL METHOD: ABNORMAL
DISPENSE STATUS: NORMAL
DOP CALC AO PEAK VEL: 1.5 M/S
DOP CALC AO VTI: 23.5 CM
DOP CALC LVOT AREA: 3.1 CM2
DOP CALC LVOT DIAMETER: 1.98 CM
DOP CALC LVOT PEAK VEL: 0.91 M/S
DOP CALC LVOT STROKE VOLUME: 42.22 CM3
DOP CALCLVOT PEAK VEL VTI: 13.72 CM
E WAVE DECELERATION TIME: 114.88 MSEC
E/A RATIO: 0.68
E/E' RATIO: 11.43 M/S
ECHO LV POSTERIOR WALL: 0.97 CM (ref 0.6–1.1)
EJECTION FRACTION: 50 %
EOSINOPHIL # BLD AUTO: 0 K/UL (ref 0–0.5)
EOSINOPHIL # BLD AUTO: 0 K/UL (ref 0–0.5)
EOSINOPHIL NFR BLD: 0.1 % (ref 0–8)
EOSINOPHIL NFR BLD: 0.2 % (ref 0–8)
ERYTHROCYTE [DISTWIDTH] IN BLOOD BY AUTOMATED COUNT: 18.2 % (ref 11.5–14.5)
ERYTHROCYTE [DISTWIDTH] IN BLOOD BY AUTOMATED COUNT: 18.5 % (ref 11.5–14.5)
EST. GFR  (AFRICAN AMERICAN): 59.8 ML/MIN/1.73 M^2
EST. GFR  (NON AFRICAN AMERICAN): 51.9 ML/MIN/1.73 M^2
FRACTIONAL SHORTENING: 28 % (ref 28–44)
GLUCOSE SERPL-MCNC: 95 MG/DL (ref 70–110)
HCT VFR BLD AUTO: 20.7 % (ref 37–48.5)
HCT VFR BLD AUTO: 23.5 % (ref 37–48.5)
HGB BLD-MCNC: 6.2 G/DL (ref 12–16)
HGB BLD-MCNC: 6.9 G/DL (ref 12–16)
IMM GRANULOCYTES # BLD AUTO: 0.15 K/UL (ref 0–0.04)
IMM GRANULOCYTES # BLD AUTO: 0.18 K/UL (ref 0–0.04)
IMM GRANULOCYTES NFR BLD AUTO: 1.5 % (ref 0–0.5)
IMM GRANULOCYTES NFR BLD AUTO: 1.8 % (ref 0–0.5)
INTERVENTRICULAR SEPTUM: 1.06 CM (ref 0.6–1.1)
LA MAJOR: 4.99 CM
LA MINOR: 4.99 CM
LA WIDTH: 4.19 CM
LEFT ATRIUM SIZE: 4.04 CM
LEFT ATRIUM VOLUME INDEX MOD: 40.5 ML/M2
LEFT ATRIUM VOLUME INDEX: 41.3 ML/M2
LEFT ATRIUM VOLUME MOD: 70.42 CM3
LEFT ATRIUM VOLUME: 71.8 CM3
LEFT INTERNAL DIMENSION IN SYSTOLE: 3.03 CM (ref 2.1–4)
LEFT VENTRICLE DIASTOLIC VOLUME INDEX: 45.21 ML/M2
LEFT VENTRICLE DIASTOLIC VOLUME: 78.66 ML
LEFT VENTRICLE MASS INDEX: 81 G/M2
LEFT VENTRICLE SYSTOLIC VOLUME INDEX: 20.5 ML/M2
LEFT VENTRICLE SYSTOLIC VOLUME: 35.74 ML
LEFT VENTRICULAR INTERNAL DIMENSION IN DIASTOLE: 4.2 CM (ref 3.5–6)
LEFT VENTRICULAR MASS: 140.14 G
LV LATERAL E/E' RATIO: 11.43 M/S
LV SEPTAL E/E' RATIO: 11.43 M/S
LYMPHOCYTES # BLD AUTO: 0.6 K/UL (ref 1–4.8)
LYMPHOCYTES # BLD AUTO: 0.9 K/UL (ref 1–4.8)
LYMPHOCYTES NFR BLD: 6.2 % (ref 18–48)
LYMPHOCYTES NFR BLD: 8.4 % (ref 18–48)
MAGNESIUM SERPL-MCNC: 1.8 MG/DL (ref 1.6–2.6)
MCH RBC QN AUTO: 26.7 PG (ref 27–31)
MCH RBC QN AUTO: 26.8 PG (ref 27–31)
MCHC RBC AUTO-ENTMCNC: 29.4 G/DL (ref 32–36)
MCHC RBC AUTO-ENTMCNC: 30 G/DL (ref 32–36)
MCV RBC AUTO: 89 FL (ref 82–98)
MCV RBC AUTO: 91 FL (ref 82–98)
MONOCYTES # BLD AUTO: 1.5 K/UL (ref 0.3–1)
MONOCYTES # BLD AUTO: 1.6 K/UL (ref 0.3–1)
MONOCYTES NFR BLD: 14.8 % (ref 4–15)
MONOCYTES NFR BLD: 16.2 % (ref 4–15)
MV A" WAVE DURATION": 8.37 MSEC
MV PEAK A VEL: 1.18 M/S
MV PEAK E VEL: 0.8 M/S
MV STENOSIS PRESSURE HALF TIME: 33.31 MS
MV VALVE AREA P 1/2 METHOD: 6.6 CM2
NEUTROPHILS # BLD AUTO: 7.5 K/UL (ref 1.8–7.7)
NEUTROPHILS # BLD AUTO: 7.6 K/UL (ref 1.8–7.7)
NEUTROPHILS NFR BLD: 73.7 % (ref 38–73)
NEUTROPHILS NFR BLD: 76.9 % (ref 38–73)
NRBC BLD-RTO: 0 /100 WBC
NRBC BLD-RTO: 0 /100 WBC
PHOSPHATE SERPL-MCNC: 4.5 MG/DL (ref 2.7–4.5)
PISA MRMAX VEL: 0.05 M/S
PISA TR MAX VEL: 2.69 M/S
PLATELET # BLD AUTO: 167 K/UL (ref 150–450)
PLATELET # BLD AUTO: 205 K/UL (ref 150–450)
PMV BLD AUTO: 10.7 FL (ref 9.2–12.9)
PMV BLD AUTO: 11.2 FL (ref 9.2–12.9)
POCT GLUCOSE: 137 MG/DL (ref 70–110)
POCT GLUCOSE: 79 MG/DL (ref 70–110)
POTASSIUM SERPL-SCNC: 5.2 MMOL/L (ref 3.5–5.1)
PROT SERPL-MCNC: 5 G/DL (ref 6–8.4)
PULM VEIN S/D RATIO: 0.84
PV PEAK D VEL: 0.49 M/S
PV PEAK S VEL: 0.41 M/S
RA MAJOR: 4.99 CM
RA PRESSURE: 3 MMHG
RA WIDTH: 2.15 CM
RBC # BLD AUTO: 2.32 M/UL (ref 4–5.4)
RBC # BLD AUTO: 2.57 M/UL (ref 4–5.4)
RIGHT VENTRICULAR END-DIASTOLIC DIMENSION: 2.47 CM
RV TISSUE DOPPLER FREE WALL SYSTOLIC VELOCITY 1 (APICAL 4 CHAMBER VIEW): 18 CM/S
SINUS: 2.8 CM
SODIUM SERPL-SCNC: 138 MMOL/L (ref 136–145)
STJ: 2.37 CM
TDI LATERAL: 0.07 M/S
TDI SEPTAL: 0.07 M/S
TDI: 0.07 M/S
TR MAX PG: 29 MMHG
TRANS ERYTHROCYTES VOL PATIENT: NORMAL ML
TRICUSPID ANNULAR PLANE SYSTOLIC EXCURSION: 1.64 CM
TV REST PULMONARY ARTERY PRESSURE: 32 MMHG
WBC # BLD AUTO: 10.11 K/UL (ref 3.9–12.7)
WBC # BLD AUTO: 9.9 K/UL (ref 3.9–12.7)

## 2022-01-24 PROCEDURE — 25000003 PHARM REV CODE 250: Performed by: STUDENT IN AN ORGANIZED HEALTH CARE EDUCATION/TRAINING PROGRAM

## 2022-01-24 PROCEDURE — 93010 ELECTROCARDIOGRAM REPORT: CPT | Mod: ,,, | Performed by: INTERNAL MEDICINE

## 2022-01-24 PROCEDURE — 76937 US GUIDE VASCULAR ACCESS: CPT

## 2022-01-24 PROCEDURE — 99291 PR CRITICAL CARE, E/M 30-74 MINUTES: ICD-10-PCS | Mod: CR,GC,, | Performed by: STUDENT IN AN ORGANIZED HEALTH CARE EDUCATION/TRAINING PROGRAM

## 2022-01-24 PROCEDURE — 86920 COMPATIBILITY TEST SPIN: CPT | Performed by: EMERGENCY MEDICINE

## 2022-01-24 PROCEDURE — 86480 TB TEST CELL IMMUN MEASURE: CPT | Performed by: EMERGENCY MEDICINE

## 2022-01-24 PROCEDURE — 97535 SELF CARE MNGMENT TRAINING: CPT

## 2022-01-24 PROCEDURE — 63600175 PHARM REV CODE 636 W HCPCS: Performed by: EMERGENCY MEDICINE

## 2022-01-24 PROCEDURE — 80053 COMPREHEN METABOLIC PANEL: CPT | Performed by: EMERGENCY MEDICINE

## 2022-01-24 PROCEDURE — 86850 RBC ANTIBODY SCREEN: CPT | Performed by: EMERGENCY MEDICINE

## 2022-01-24 PROCEDURE — 63600175 PHARM REV CODE 636 W HCPCS

## 2022-01-24 PROCEDURE — 27000221 HC OXYGEN, UP TO 24 HOURS

## 2022-01-24 PROCEDURE — 86704 HEP B CORE ANTIBODY TOTAL: CPT | Performed by: EMERGENCY MEDICINE

## 2022-01-24 PROCEDURE — 93005 ELECTROCARDIOGRAM TRACING: CPT

## 2022-01-24 PROCEDURE — P9021 RED BLOOD CELLS UNIT: HCPCS | Performed by: EMERGENCY MEDICINE

## 2022-01-24 PROCEDURE — 36415 COLL VENOUS BLD VENIPUNCTURE: CPT | Performed by: STUDENT IN AN ORGANIZED HEALTH CARE EDUCATION/TRAINING PROGRAM

## 2022-01-24 PROCEDURE — 99291 CRITICAL CARE FIRST HOUR: CPT | Mod: CR,GC,, | Performed by: STUDENT IN AN ORGANIZED HEALTH CARE EDUCATION/TRAINING PROGRAM

## 2022-01-24 PROCEDURE — 97165 OT EVAL LOW COMPLEX 30 MIN: CPT

## 2022-01-24 PROCEDURE — 92610 EVALUATE SWALLOWING FUNCTION: CPT

## 2022-01-24 PROCEDURE — 27000207 HC ISOLATION

## 2022-01-24 PROCEDURE — 99900035 HC TECH TIME PER 15 MIN (STAT)

## 2022-01-24 PROCEDURE — 63600175 PHARM REV CODE 636 W HCPCS: Performed by: STUDENT IN AN ORGANIZED HEALTH CARE EDUCATION/TRAINING PROGRAM

## 2022-01-24 PROCEDURE — C1751 CATH, INF, PER/CENT/MIDLINE: HCPCS

## 2022-01-24 PROCEDURE — 85730 THROMBOPLASTIN TIME PARTIAL: CPT | Mod: 91 | Performed by: INTERNAL MEDICINE

## 2022-01-24 PROCEDURE — 93010 EKG 12-LEAD: ICD-10-PCS | Mod: ,,, | Performed by: INTERNAL MEDICINE

## 2022-01-24 PROCEDURE — 36410 VNPNXR 3YR/> PHY/QHP DX/THER: CPT

## 2022-01-24 PROCEDURE — 87340 HEPATITIS B SURFACE AG IA: CPT | Performed by: EMERGENCY MEDICINE

## 2022-01-24 PROCEDURE — 87040 BLOOD CULTURE FOR BACTERIA: CPT

## 2022-01-24 PROCEDURE — 97530 THERAPEUTIC ACTIVITIES: CPT

## 2022-01-24 PROCEDURE — 84100 ASSAY OF PHOSPHORUS: CPT | Performed by: EMERGENCY MEDICINE

## 2022-01-24 PROCEDURE — 94761 N-INVAS EAR/PLS OXIMETRY MLT: CPT

## 2022-01-24 PROCEDURE — 83735 ASSAY OF MAGNESIUM: CPT | Performed by: EMERGENCY MEDICINE

## 2022-01-24 PROCEDURE — C9399 UNCLASSIFIED DRUGS OR BIOLOG: HCPCS | Performed by: EMERGENCY MEDICINE

## 2022-01-24 PROCEDURE — 25000242 PHARM REV CODE 250 ALT 637 W/ HCPCS: Performed by: EMERGENCY MEDICINE

## 2022-01-24 PROCEDURE — 97162 PT EVAL MOD COMPLEX 30 MIN: CPT

## 2022-01-24 PROCEDURE — 85025 COMPLETE CBC W/AUTO DIFF WBC: CPT | Performed by: EMERGENCY MEDICINE

## 2022-01-24 PROCEDURE — 36430 TRANSFUSION BLD/BLD COMPNT: CPT

## 2022-01-24 PROCEDURE — 25000003 PHARM REV CODE 250: Performed by: EMERGENCY MEDICINE

## 2022-01-24 PROCEDURE — 94640 AIRWAY INHALATION TREATMENT: CPT

## 2022-01-24 PROCEDURE — 25000003 PHARM REV CODE 250

## 2022-01-24 PROCEDURE — 20000000 HC ICU ROOM

## 2022-01-24 PROCEDURE — 85025 COMPLETE CBC W/AUTO DIFF WBC: CPT | Mod: 91

## 2022-01-24 PROCEDURE — 85730 THROMBOPLASTIN TIME PARTIAL: CPT | Mod: 91 | Performed by: STUDENT IN AN ORGANIZED HEALTH CARE EDUCATION/TRAINING PROGRAM

## 2022-01-24 RX ORDER — TALC
6 POWDER (GRAM) TOPICAL NIGHTLY
Status: DISCONTINUED | OUTPATIENT
Start: 2022-01-24 | End: 2022-02-08 | Stop reason: HOSPADM

## 2022-01-24 RX ORDER — MUPIROCIN 20 MG/G
OINTMENT TOPICAL 2 TIMES DAILY
Status: COMPLETED | OUTPATIENT
Start: 2022-01-24 | End: 2022-01-28

## 2022-01-24 RX ORDER — PANTOPRAZOLE SODIUM 40 MG/1
40 TABLET, DELAYED RELEASE ORAL DAILY
Status: DISCONTINUED | OUTPATIENT
Start: 2022-01-25 | End: 2022-02-08 | Stop reason: HOSPADM

## 2022-01-24 RX ORDER — LORAZEPAM 2 MG/ML
0.5 INJECTION INTRAMUSCULAR EVERY 4 HOURS PRN
Status: DISCONTINUED | OUTPATIENT
Start: 2022-01-24 | End: 2022-01-24

## 2022-01-24 RX ORDER — DEXMEDETOMIDINE HYDROCHLORIDE 4 UG/ML
0-1.4 INJECTION, SOLUTION INTRAVENOUS CONTINUOUS
Status: DISCONTINUED | OUTPATIENT
Start: 2022-01-24 | End: 2022-01-24

## 2022-01-24 RX ORDER — MIDAZOLAM HYDROCHLORIDE 1 MG/ML
2 INJECTION INTRAMUSCULAR; INTRAVENOUS ONCE
Status: COMPLETED | OUTPATIENT
Start: 2022-01-24 | End: 2022-01-24

## 2022-01-24 RX ORDER — POLYETHYLENE GLYCOL 3350 17 G/17G
17 POWDER, FOR SOLUTION ORAL 2 TIMES DAILY PRN
Status: DISCONTINUED | OUTPATIENT
Start: 2022-01-24 | End: 2022-02-08 | Stop reason: HOSPADM

## 2022-01-24 RX ORDER — NOREPINEPHRINE BITARTRATE/D5W 4MG/250ML
PLASTIC BAG, INJECTION (ML) INTRAVENOUS
Status: DISPENSED
Start: 2022-01-24 | End: 2022-01-25

## 2022-01-24 RX ORDER — DEXMEDETOMIDINE HYDROCHLORIDE 4 UG/ML
0-1.4 INJECTION, SOLUTION INTRAVENOUS CONTINUOUS
Status: DISCONTINUED | OUTPATIENT
Start: 2022-01-24 | End: 2022-01-25

## 2022-01-24 RX ORDER — HYDROCODONE BITARTRATE AND ACETAMINOPHEN 500; 5 MG/1; MG/1
TABLET ORAL
Status: DISCONTINUED | OUTPATIENT
Start: 2022-01-24 | End: 2022-02-08 | Stop reason: HOSPADM

## 2022-01-24 RX ORDER — VANCOMYCIN HCL IN 5 % DEXTROSE 1G/250ML
15 PLASTIC BAG, INJECTION (ML) INTRAVENOUS
Status: DISCONTINUED | OUTPATIENT
Start: 2022-01-24 | End: 2022-01-26

## 2022-01-24 RX ORDER — IPRATROPIUM BROMIDE AND ALBUTEROL SULFATE 2.5; .5 MG/3ML; MG/3ML
3 SOLUTION RESPIRATORY (INHALATION) EVERY 6 HOURS
Status: DISCONTINUED | OUTPATIENT
Start: 2022-01-24 | End: 2022-01-30

## 2022-01-24 RX ORDER — DEXMEDETOMIDINE HYDROCHLORIDE 4 UG/ML
INJECTION, SOLUTION INTRAVENOUS
Status: DISPENSED
Start: 2022-01-24 | End: 2022-01-25

## 2022-01-24 RX ADMIN — BARICITINIB 2 MG: 2 TABLET, FILM COATED ORAL at 11:01

## 2022-01-24 RX ADMIN — IPRATROPIUM BROMIDE AND ALBUTEROL SULFATE 3 ML: 2.5; .5 SOLUTION RESPIRATORY (INHALATION) at 07:01

## 2022-01-24 RX ADMIN — DEXMEDETOMIDINE HYDROCHLORIDE 0.2 MCG/KG/HR: 4 INJECTION, SOLUTION INTRAVENOUS at 02:01

## 2022-01-24 RX ADMIN — PIPERACILLIN AND TAZOBACTAM 4.5 G: 4; .5 INJECTION, POWDER, LYOPHILIZED, FOR SOLUTION INTRAVENOUS; PARENTERAL at 10:01

## 2022-01-24 RX ADMIN — FAMOTIDINE 20 MG: 10 INJECTION INTRAVENOUS at 09:01

## 2022-01-24 RX ADMIN — MUPIROCIN: 20 OINTMENT TOPICAL at 10:01

## 2022-01-24 RX ADMIN — MUPIROCIN: 20 OINTMENT TOPICAL at 09:01

## 2022-01-24 RX ADMIN — MIDAZOLAM 2 MG: 1 INJECTION INTRAMUSCULAR; INTRAVENOUS at 12:01

## 2022-01-24 RX ADMIN — VANCOMYCIN HYDROCHLORIDE 1000 MG: 1 INJECTION, POWDER, LYOPHILIZED, FOR SOLUTION INTRAVENOUS at 05:01

## 2022-01-24 RX ADMIN — HEPARIN SODIUM 8 UNITS/KG/HR: 1000 INJECTION INTRAVENOUS; SUBCUTANEOUS at 11:01

## 2022-01-24 RX ADMIN — REMDESIVIR 100 MG: 100 INJECTION, POWDER, LYOPHILIZED, FOR SOLUTION INTRAVENOUS at 03:01

## 2022-01-24 RX ADMIN — IPRATROPIUM BROMIDE AND ALBUTEROL SULFATE 3 ML: 2.5; .5 SOLUTION RESPIRATORY (INHALATION) at 01:01

## 2022-01-24 RX ADMIN — PIPERACILLIN AND TAZOBACTAM 4.5 G: 4; .5 INJECTION, POWDER, LYOPHILIZED, FOR SOLUTION INTRAVENOUS; PARENTERAL at 01:01

## 2022-01-24 RX ADMIN — FUROSEMIDE 20 MG: 10 INJECTION, SOLUTION INTRAVENOUS at 09:01

## 2022-01-24 NOTE — ASSESSMENT & PLAN NOTE
- ED started vanc and zosyn   - D/c vanc => restarted due to bacteremia with strep  - Continue zosyn

## 2022-01-24 NOTE — PLAN OF CARE
Problem: Physical Therapy Goal  Goal: Physical Therapy Goal  Description: Goals to be met by: 22    Patient will increase functional independence with mobility by performin. Supine to sit with MInimal Assistance -not met  2. Sit to stand transfer with Minimal Assistance with RW - not met  3. Bed to chair transfer with Minimal Assistance using Rolling Walker -not met  4. Gait  x 20 feet with Moderate Assistance using Rolling Walker. -not met  5. Ascend/descend 5 stair with right Handrails Moderate Assistance -not met  6. Sitting at edge of bed x10 minutes with Contact Guard Assistance to perform activities and increase endurance - not met    Outcome: Ongoing, Progressing   Evaluation completed and goals appropriate. 2022

## 2022-01-24 NOTE — PLAN OF CARE
[facility] ([place] [address])  Initial Discharge Assessment       Primary Care Provider: Bhargav Hirsch MD    Admission Diagnosis: Dyspnea [R06.00]  Urinary tract infection without hematuria, site unspecified [N39.0]  Altered mental status, unspecified altered mental status type [R41.82]  Acute hypoxemic respiratory failure [J96.01]  COVID-19 virus infection [U07.1]  Pneumonia due to COVID-19 virus [U07.1, J12.82]    Admission Date: 1/23/2022  Expected Discharge Date: 1/27/2022    Discharge Barriers Identified: None    Payor: MEDICARE / Plan: MEDICARE PART A & B / Product Type: Government /     Extended Emergency Contact Information  Primary Emergency Contact: Rosy Rosado  Address: [facility] [place] [address] [place]  Mobile Phone: [phone]  Relation: Daughter  Secondary Emergency Contact: Vania Silveira  Address: [address] [place] [address] [facility]  Home Phone: [phone]  Relation: Sister    Discharge Plan A: Rehab  Discharge Plan B: Home with family      [id number] [place], [place] - [address] [facility]  [address] [facility] [address]  Phone: [phone] Fax: [phone]    CM spoke with Rosy Rosado (daughter) [phone] via phone for Discharge Planning Assessment. Patient was unable to answer questions due respiratory issues and resting. Per daughter, daughter lives with patient hollis 1-story home with 5 steps to enter. Per daughter, patient was dependent with ADLs and used straight cane and rolling walker for ambulation. Per daughter, patient is not on dialysis and does not take Coumadin. Patient will have assistance from Rosy Rosado (daughter) [phone], if does not go to rehab upon discharge. Discharge Planning discussed with daughter via phone. All questions addressed. CM to follow for further needs.    Initial Assessment (most  recent)     Adult Discharge Assessment - 01/24/22 1100        Discharge Assessment    Assessment Type Discharge Planning Assessment     Confirmed/corrected address, phone number and insurance Yes     Confirmed Demographics Correct on Facesheet     Source of Information family     When was your last doctors appointment? 11/22/21     Communicated LETICIA with patient/caregiver Date not available/Unable to determine     Reason For Admission Acute hypoxic respiratory failure due to pneumonia, due to Covid-19     Lives With child(yash), adult     Facility Arrived From: Home     Do you expect to return to your current living situation? Yes     Do you have help at home or someone to help you manage your care at home? Yes     Who are your caregiver(s) and their phone number(s)? Rosy Rosado (daughter) [phone]     Prior to hospitilization cognitive status: Not Oriented to Time;Not Oriented to Place;Not Oriented to Person     Current cognitive status: Unable to Assess     Walking or Climbing Stairs Difficulty ambulation difficulty, requires equipment     Mobility Management straight cane, rolling walker     Dressing/Bathing Difficulty bathing difficulty, assistance 1 person;dressing difficulty, assistance 1 person     Dressing/Bathing Management Daughter assists in these tasks     Equipment Currently Used at Home walker, rolling;cane, straight     Readmission within 30 days? No     Patient currently being followed by outpatient case management? No     Do you currently have service(s) that help you manage your care at home? No     Do you take prescription medications? Yes     Do you have prescription coverage? Yes     Coverage Medicare Part A & B,   for Life/Medicare     Do you have any problems affording any of your prescribed medications? No     Who is going to help you get home at discharge? Rosy Rosado (daughter) [phone] if patient does not go to rehab.     Are you on dialysis? No     Do you take  coumadin? No     Discharge Plan A Rehab     Discharge Plan B Home with family     DME Needed Upon Discharge  other (see comments)   TBD    Discharge Plan discussed with: Adult children     Discharge Barriers Identified None        Relationship/Environment    Name(s) of Who Lives With Patient Rosy Rosado (daughter) [phone]                        PCP:  Bhargav Hirsch MD  [phone]        Pharmacy:    [facility] #[id number] [place], [place] - [address] [facility]  [address] [facility] [address]  Phone: [phone] Fax: [phone]        Emergency Contacts:  Extended Emergency Contact Information  Primary Emergency Contact: Rosy Rosaod  Address: [facility] [place] [address] [place]  Mobile Phone: [phone]  Relation: Daughter  Secondary Emergency Contact: Vania Silveira  Address: [address] [place] [address] [address] [place] [address]  Home Phone: [phone]  Relation: Sister      Insurance:    Payor: MEDICARE / Plan: MEDICARE PART A & B / Product Type: Government /     Mattie Kaminski RN     [phone]      01/24/2022  11:19 AM

## 2022-01-24 NOTE — ASSESSMENT & PLAN NOTE
Patient is on chronic prednisone for her , on Plaquenil and Cellcept for RA. Historically prescribe bactrim for PCP prophylaxis   - Per Pharmacy rec, considering Prophylaxis Pentamidine is Aerosolized 300 mg by inhalation via Respirgard II nebulizer every month

## 2022-01-24 NOTE — PT/OT/SLP EVAL
Speech Language Pathology Evaluation  Bedside Swallow    Patient Name:  Selma Lux   MRN:  [id number]  Admitting Diagnosis: <principal problem not specified>    Recommendations:                 General Recommendations:  Dysphagia therapy, Speech language evaluation and Cognitive-linguistic evaluation  Diet recommendations:  Dental Soft, Thin   Aspiration Precautions: 1 bite/sip at a time, Assistance with meals, Eliminate distractions, Feed only when awake/alert, HOB to 90 degrees, Meds whole 1 at a time and Standard aspiration precautions   General Precautions: Standard, fall,droplet,contact  Communication strategies:  none    History:     Past Medical History:   Diagnosis Date    Acute cystitis without hematuria 2/27/2020    Acute hypoxemic respiratory failure 4/11/2018    Acute respiratory failure with hypoxia 3/2/2020    Altered mental status     Anemia     Arthritis     Bilateral hand pain 3/14/2018    Branch retinal vein occlusion, left eye 2/20/2015    Chronic bilateral low back pain without sciatica 3/23/2017    Chronic renal failure in pediatric patient, stage 3 (moderate) 4/15/2018    Cognitive communication deficit 12/19/2017    Cystoid macular edema, left eye 2/20/2015    Cystoid macular edema, left eye 2/20/2015    DJD (degenerative joint disease) of cervical spine 5/15/2013    Fatty liver 8/26/2014    Goiter 4/9/2018    Hashimoto's disease     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    Hypertension     Hypertensive encephalopathy     IBS (irritable bowel syndrome) 6/21/2017    IGT (impaired glucose tolerance) 1/12/2016    Iron deficiency anemia secondary to inadequate dietary iron intake 6/24/2013    Joint pain     Keratoconjunctivitis sicca of both eyes not specified as Sjogren's 7/29/2016    Leiomyoma of uterus, unspecified 9/16/2014    Long QT interval 6/29/2016    Due to medication (plaquenil)     Macular edema 1/12/2015    Multinodular goiter 1/12/2016    Neuropathy 8/23/2017     Plaquenil causing adverse effect in therapeutic use 10/7/2016    Pneumococcal vaccine refused 8/17/2016    Pneumonia due to Streptococcus pneumoniae 4/5/2018    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/2015    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  Service: Endoscopy;  Laterality: N/A;    EYE SURGERY      INJECTION OF ANESTHETIC AGENT AROUND NERVE Left 4/19/2021    Procedure: BLOCK, NERVE, FEMORAL AND OBTURATOR;  Surgeon: Shivam Gonzalez MD;  Location: [facility] PAIN MGT;  Service: Pain Management;  Laterality: Left;  consent needed    JOINT REPLACEMENT      right knee    KNEE SURGERY Left 12/31/2013    TKR    left parotidectomy      mixed tumor    TX EVAL,SWALLOW FUNCTION,CINE/VIDEO RECORD  6/5/2021         SALIVARY GLAND SURGERY      TONSILLECTOMY      UPPER GASTROINTESTINAL ENDOSCOPY         Social History: Patient from rehab facility.     Subjective     Patient awake and alert. Confused. No family members present for session.     Pain/Comfort:  ·  No c/o pain.     Respiratory Status: Nasal cannula, flow 2 L/min    Objective:     Oral Musculature Evaluation  · Oral Musculature: WFL  · Dentition: present and adequate  · Secretion Management: adequate  · Mucosal Quality: good  · Mandibular Strength and Mobility: WFL  · Oral Labial Strength and Mobility: WFL  · Velar Elevation: WFL  · Voice Prior to PO Intake: clear    Bedside Swallow Eval:   Consistencies Assessed:  · Thin liquids via cup and straw [id number]  · Puree x3  · Solids x3      Oral Phase:   · poor bolus fromation/cohesion. talking with PO in oral cavity  · Prolonged mastication    Pharyngeal Phase:   · no overt clinical signs/symptoms of aspiration  · no overt clinical signs/symptoms of pharyngeal dysphagia    Compensatory Strategies  · None    Treatment: Recommend dysphagia soft diet and this time. Skilled education was provided to patientre: diet recs, standard aspiration precautions of which to follow, and ongoing ST plan of care. Recommend reinforcement.     Assessment:     Selma Lux is a 86 y.o. female with an SLP diagnosis of Dysphagia.      Goals:   Multidisciplinary Problems     SLP Goals        Problem: SLP Goal    Goal Priority Disciplines Outcome   SLP Goal     SLP Ongoing, Progressing   Description: Speech Language Pathology Goals  Goals expected to be met by 1/31:  1. Patient will tolerate a dysphagia soft diet and thin liquids with no overt signs of airway compromise.   2. Patient will participate in a full SLC E when appropriate.                            Plan:     · Patient to be seen:  4 x/week   · Plan of Care expires:     · Plan of Care reviewed with:  patient   · SLP Follow-Up:  Yes       Discharge recommendations:  rehabilitation facility   Barriers to Discharge:  None    Time Tracking:     SLP Treatment Date:   01/24/22  Speech Start Time:  0957  Speech Stop Time:  1011     Speech Total Time (min):  14 min    Billable Minutes: Eval Swallow and Oral Function 6 and Self Care/Home Management Training 8    01/24/2022

## 2022-01-24 NOTE — ASSESSMENT & PLAN NOTE
Admitted with acute hypoxemic respiratory failure 2/2 COVID-19. Vaccination status: vaccinated. COVID+ on 1/23/2022. Course complicated by history of COPD, HF, encephalopathy.    Plan:  - Anticoagulation: heparin  - Antibiotics: zosyn   - Remdesivir x 5 days (1/23)  - Baricitinib x 14 days (1/23), holding Cellcept   - Dexamethasone 6mg x 10 days (1/23), holding prednisone   - Self-proning as tolerated  - On 8L NC, stable blood gas on admission, will start NIV as indicated  - Continuous pulse ox  - Wean supplemental O2 as tolerated  - Trend CRP, LDH, ferritin q48hr  - Airborne + contact precautions

## 2022-01-24 NOTE — CONSULTS
Placed 18 gauge x 8 cm midline in Left brachial vein using realtime ultrasound guidance.  Lot#[id number].  Removal date on or before 2/22/22.  Imagery recorded and saved.

## 2022-01-24 NOTE — ASSESSMENT & PLAN NOTE
Patient is identified as having Grade 1 diastolic dysfunction heart failure that is Chronic. CHF is currently controlled. Latest ECHO performed and demonstrates- Results for orders placed during the hospital encounter of 02/27/20    Echo Color Flow Doppler? Yes    Interpretation Summary  · Concentric left ventricular remodeling. Normal left ventricular systolic function. The estimated ejection fraction is 55%.  · Mild left atrial enlargement.  · Grade I (mild) left ventricular diastolic dysfunction consistent with impaired relaxation.  · Normal right ventricular systolic function.  · Mild mitral regurgitation.  · Mild tricuspid regurgitation.  · Normal central venous pressure (3 mmHg).  · The estimated PA systolic pressure is 39 mmHg.    Tachycardia with HR of 110's  . Continue Furosemide and monitor clinical status closely. Monitor on telemetry. Patient is on CHF pathway.  Monitor strict Is&Os and daily weights.  Place on fluid restriction of 1 L. Continue to stress to patient importance of self efficacy and  on diet for CHF. Last BNP reviewed- and noted below   Recent Labs   Lab 01/23/22  1023   *   .

## 2022-01-24 NOTE — PLAN OF CARE
Problem: Occupational Therapy Goal  Goal: Occupational Therapy Goal  Description: Goals to be met by: 2/7/2022    Patient will increase functional independence with ADLs by performing:    UE Dressing with Minimal Assistance.  Grooming while EOB with Moderate Assistance.  Toileting from bedside commode with Moderate Assistance for hygiene and clothing management.   Sitting at edge of bed x10 minutes with Moderate Assistance.  Toilet transfer to bedside commode with Moderate Assistance.    Outcome: Ongoing, Progressing

## 2022-01-24 NOTE — ASSESSMENT & PLAN NOTE
Patient with Hypoxic Respiratory failure which is Acute.  she is not on home oxygen. Supplemental oxygen was provided and noted-  .   Signs/symptoms of respiratory failure include- tachypnea. Contributing diagnoses includes - CHF, COPD and Interstitial lung disease Labs and images were reviewed. Patient Has recent ABG, which has been reviewed. Will treat underlying causes and adjust management of respiratory failure as follows- please see Covid-19

## 2022-01-24 NOTE — HOSPITAL COURSE
Selma Lux is a 84 y.o. 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  She has had encephalopathy during her hospital stay and admission to rehab. She tested positive for COVID on 1/23.  Paramedics state that she has been having very poor oral intake. Patient was admitted to ICU for hypotension and respiratory distress, anticipating rapid deterioration 2/2 her medical history. Patient is on 4L NC, SpO2 stable, she had episode of hypotensions but not sustained, no pressor indicated. Mentation improved in the morning. Patient required sedation for procedure. Family visit seemed to calm her down. Patient no longer required Precedex gtt. Qtc prolongation resolved, likely 2/2 to chronic plaquenil. Patient sat well on room air. Patient ready to transfer to lower level of care unit

## 2022-01-24 NOTE — ASSESSMENT & PLAN NOTE
- Enlarged thyroid gland resulting in leftward deviation and mass effect upon the airway  - Prior consultations with surgery, patient lost following up due to multiple intermittent medical events

## 2022-01-24 NOTE — PLAN OF CARE
Patient's daughter, Rosy advised if patient is to go to rehab, she does not want [facility]. Would preferably like [facility] CM to follow for pt/ot recs to send referrals when appropriate.      Mattie Kaminski RN     [phone]

## 2022-01-24 NOTE — PLAN OF CARE
Problem: SLP Goal  Goal: SLP Goal  Description: Speech Language Pathology Goals  Goals expected to be met by 1/31:  1. Patient will tolerate a dysphagia soft diet and thin liquids with no overt signs of airway compromise.   2. Patient will participate in a full SLC E when appropriate.     Outcome: Ongoing, Progressing     Goals implemented.

## 2022-01-24 NOTE — NURSING
Called and informed Dr Mcginnis that pt has become combative and is not allowing midline RN to place line-pt needs this line for addictional IV access for meds orders and freq lab draws.  Will assess for medication to order.

## 2022-01-24 NOTE — NURSING
Informed Dr Mcginnis that pt does not have enough IV access to give IV (Remdisivir)-awaiting Midline RN.

## 2022-01-24 NOTE — PROGRESS NOTES
[facility] ([place] [id number])  Critical Care Medicine  Progress Note    Patient Name: Selma Lux  MRN: [id number]  Admission Date: 1/23/2022  Hospital Length of Stay: 1 days  Code Status: Full Code  Attending Provider: Cristian Beckford MD  Primary Care Provider: Bhargav Hirsch MD   Principal Problem: <principal problem not specified>    Subjective:     HPI:  Selma Lux is a 84 y.o. 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  She has had encephalopathy during her hospital stay and admission to rehab. She tested positive for COVID yesterday.  Paramedics state that she has been having very poor oral intake.     Collateral information from her daughter.  She notes that she has had difficulty breathing for several months.  She states she has cryptogenic pneumonia and received steroids and breathing treatments for this. 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  She noted that she had been on the ventilator and really hated being on the ventilator.  However, she would want to be intubated if her life depended on it.  She would want to try everything possible to avoid intubation.  She noted that she tested positive for COVID yesterday along with her mother.  She states that she is not able to come see her mother due to coronavirus.  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    ED course: Patient with positive COVID test and respiratory distress. This seems to be a recurrent  problem with her history of cryptogenic organizing pneumonia. Prior chest x-rays seems to have had a similar appearance to the x-ray from today.  Diffuse infiltrates.  This resolved on recent chest x-rays from her admission.  Of note this recent admission is under a different registration under the same name in epic.   Her venous blood gas does not show respiratory failure or CO2 retention. She seems to have encephalopathy but this has been an ongoing issue. Her sodium today is normalized. Patient has markedly infected urine and an elevated white count.  ED started broad-spectrum IV antibiotics.    CT Head 1/23: Mixed density extra-axial collection overlies the right cerebral convexity overall reduced in size from prior compatible with evolving subacute subdural hemorrhage.  No significant new hemorrhage. no definite parenchymal hemorrhage or sulcal effacement to suggest large territory recent infarction.    CT chest 1/23: Interval development of patchy consolidation in both lungs with a bibasilar predominance along with a small right pleural effusion.  Findings most consistent with multifocal pneumonia.  Follow-up to resolution advised. Thyroid gland is diffusely enlarged.  Nodule on the right is stable in size resulting in leftward deviation and mass effect upon the airway.      Hospital/ICU Course:  Selma Lux is a 84 y.o. 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  She has had encephalopathy during her hospital stay and admission to rehab. She tested positive for COVID on 1/23.  Paramedics state that she has been having very poor oral intake.  Patient was admitted to ICU for hypotension and respiratory distress, anticipating rapid deterioration 2/2 her medical history. Patient is on 4L NC, SpO2 stable, she had episode of hypotensions but not sustained, no pressor indicated. Mentation improved in the morning. Ready for stepdown      Interval History/Significant Events: NAEON. Sat well on 4L NC. Reports being hungry and asks for coffee. Increased mucous production but tolerated secretion well, self suctioning.     Review of Systems   Constitutional: Negative for fever.   HENT: Positive for congestion.    Eyes: Negative for visual disturbance.   Respiratory: Positive for cough and shortness of breath. Negative for choking.    Cardiovascular: Negative for chest pain.   Gastrointestinal: Negative for abdominal distention and abdominal pain.   Genitourinary: Negative for dysuria.   Musculoskeletal: Positive for arthralgias (chronic).   Skin: Negative for rash.   Neurological: Negative for numbness and headaches.     Objective:     Vital Signs (Most Recent):  Temp: 98.5 °F (36.9 °C) (01/24/22 0301)  Pulse: 104 (01/24/22 0900)  Resp: (!) 49 (01/24/22 0900)  BP: 104/60 (01/24/22 0701)  SpO2: 98 % (01/24/22 0900) Vital Signs (24h Range):  Temp:  [98.5 °F (36.9 °C)-100.04 °F (37.8 °C)] 98.5 °F (36.9 °C)  Pulse:  [] 104  Resp:  [19-57] 49  SpO2:  [79 %-100 %] 98 %  BP: ()/(50-81) 104/60   Weight: 68.5 kg (151 lb)  Body mass index is 25.92 kg/m².      Intake/Output Summary (Last 24 hours) at 1/24/2022 1034  Last data filed at 1/24/2022 0900  Gross per 24 hour   Intake 971.55 ml   Output 1140 ml   Net -168.45 ml       Physical Exam  Vitals and nursing note reviewed.   Constitutional:       General: She is not in acute distress.  HENT:      Head: Normocephalic and atraumatic.      Nose: Congestion present.      Mouth/Throat:      Mouth: Mucous membranes are moist.      Pharynx: Oropharynx is clear. No oropharyngeal exudate.   Eyes:      Extraocular  Movements: Extraocular movements intact.      Pupils: Pupils are equal, round, and reactive to light.   Cardiovascular:      Rate and Rhythm: Normal rate and regular rhythm.   Pulmonary:      Effort: Pulmonary effort is normal.      Breath sounds: Rhonchi present.   Abdominal:      General: Abdomen is flat.      Tenderness: There is no abdominal tenderness.   Musculoskeletal:         General: Tenderness (lower legs (chronic)) present. No swelling.      Cervical back: Normal range of motion and neck supple.   Skin:     General: Skin is warm and dry.      Capillary Refill: Capillary refill takes less than 2 seconds.   Neurological:      General: No focal deficit present.      Mental Status: She is alert and oriented to person, place, and time. Mental status is at baseline.         Vents:     Lines/Drains/Airways     Drain                 Urethral Catheter 01/23/22 1037 Double-lumen;Temperature probe 16 Fr. <1 day          Peripheral Intravenous Line                 Peripheral IV - Single Lumen 01/23/22 0000 18 G Right Antecubital 1 day         Peripheral IV - Single Lumen 01/23/22 1030 20 G Right Shoulder 1 day              Significant Labs:    CBC/Anemia Profile:  Recent Labs   Lab 01/23/22  1051 01/23/22  1100 01/23/22  1752 01/24/22  0050 01/24/22  0503   WBC  --    < > 14.49* 9.90 10.11   HGB  --    < > 7.2* 6.2* 6.9*   HCT  --    < > 24.3* 20.7* 23.5*   PLT  --    < > 189 167 205   MCV  --    < > 91 89 91   RDW  --    < > 18.4* 18.2* 18.5*   FERRITIN 680*  --   --   --   --     < > = values in this interval not displayed.        Chemistries:  Recent Labs   Lab 01/23/22  1023 01/24/22  0503    138   K 4.6 5.2*    107   CO2 23 20*   BUN 14 19   CREATININE 0.9 1.0   CALCIUM 8.2* 7.7*   ALBUMIN 2.2* 2.0*   PROT 5.3* 5.0*   BILITOT 1.4* 0.9   ALKPHOS 105 96   ALT 13 11   AST 17 15   MG  --  1.8   PHOS  --  4.5       All pertinent labs within the past 24 hours have been reviewed.    Significant Imaging:  I  have reviewed all pertinent imaging results/findings within the past 24 hours.      ABG  Recent Labs   Lab 01/23/22  1024   PH 7.435   PO2 21*   PCO2 36.7   HCO3 24.6   BE 0     Assessment/Plan:     Pulmonary  COPD exacerbation  - wheezing present on physical exam  - schedule duoneb q6    Acute hypoxemic respiratory failure  Patient with Hypoxic Respiratory failure which is Acute.  she is not on home oxygen. Supplemental oxygen was provided and noted-  .   Signs/symptoms of respiratory failure include- tachypnea. Contributing diagnoses includes - CHF, COPD and Interstitial lung disease Labs and images were reviewed. Patient Has recent ABG, which has been reviewed. Will treat underlying causes and adjust management of respiratory failure as follows- please see Covid-19    Cardiac/Vascular  Chronic systolic (congestive) heart failure  Patient is identified as having Grade 1 diastolic dysfunction heart failure that is Chronic. CHF is currently controlled. Latest ECHO performed and demonstrates- Results for orders placed during the hospital encounter of 02/27/20    Echo Color Flow Doppler? Yes    Interpretation Summary  · Concentric left ventricular remodeling. Normal left ventricular systolic function. The estimated ejection fraction is 55%.  · Mild left atrial enlargement.  · Grade I (mild) left ventricular diastolic dysfunction consistent with impaired relaxation.  · Normal right ventricular systolic function.  · Mild mitral regurgitation.  · Mild tricuspid regurgitation.  · Normal central venous pressure (3 mmHg).  · The estimated PA systolic pressure is 39 mmHg.    Tachycardia with HR of 110's  . Continue Furosemide and monitor clinical status closely. Monitor on telemetry. Patient is on CHF pathway.  Monitor strict Is&Os and daily weights.  Place on fluid restriction of 1 L. Continue to stress to patient importance of self efficacy and  on diet for CHF. Last BNP reviewed- and noted below   Recent Labs   Lab  01/23/22  1023   *   .      AF (paroxysmal atrial fibrillation)  - On heparin  - Patient is not on home med for rate control  - consider amiodarone gtt if patient develop sustained AF with RVR    Long QT interval  - follow up on EKG  - avoid QT prolongation meds    Hypertension, essential  - Holding Amlodipine due to hypotension     Renal/  UTI (urinary tract infection)  - ED started vanc and zosyn   - D/c vanc => restarted due to bacteremia with strep  - Continue zosyn    Chronic renal failure syndrome, stage 3 (moderate)  - stable renal function   - strict I and O  - on lasix 40 mg daily => 20 mg IV    Endocrine  Multinodular goiter  - Enlarged thyroid gland resulting in leftward deviation and mass effect upon the airway => anticipate difficult airway access   - Prior consultations with surgery, patient lost following up due to multiple intermittent medical events     GI  PUD (peptic ulcer disease)  - On pantoprazole     Other  Immunocompromised state due to drug therapy  Patient is on chronic prednisone for her , on Plaquenil and Cellcept for RA. Historically prescribe bactrim for PCP prophylaxis   - Per Pharmacy rec, considering Prophylaxis Pentamidine is Aerosolized 300 mg by inhalation via Respirgard II nebulizer every month             Pneumonia due to COVID-19 virus  Admitted with acute hypoxemic respiratory failure 2/2 COVID-19. Vaccination status: vaccinated. COVID+ on 1/23/2022. Course complicated by history of COPD, HF, encephalopathy.    Plan:  - Anticoagulation: heparin  - Antibiotics: zosyn   - Remdesivir x 5 days (1/23)  - Baricitinib x 14 days (1/23), holding Cellcept   - Dexamethasone 6mg x 10 days (1/23), holding prednisone   - Self-proning as tolerated  - On 8L NC, stable blood gas on admission, will start NIV as indicated  - Continuous pulse ox  - Wean supplemental O2 as tolerated => on 4L NC since last night => ready for stepdown   - Trend CRP, LDH, ferritin q48hr  - Airborne +  contact precautions       Critical Care Daily Checklist:    A: Awake: RASS Goal/Actual Goal: RASS Goal: 0-->alert and calm  Actual: [place] Agitation Sedation Scale (RASS): Drowsy   B: Spontaneous Breathing Trial Performed?     C: SAT & SBT Coordinated?  n/a                      D: Delirium: CAM-ICU Overall CAM-ICU: Positive   E: Early Mobility Performed? Yes   F: Feeding Goal:    Status:     Current Diet Order   Procedures    Diet Adult Regular (IDDSI Level 7)      AS: Analgesia/Sedation n/a   T: Thromboembolic Prophylaxis Heparin   H: HOB > 300 Yes   U: Stress Ulcer Prophylaxis (if needed) PPI   G: Glucose Control ISS   B: Bowel Function  Miralax PRN   I: Indwelling Catheter (Lines & Hill) Necessity IV, midline   D: De-escalation of Antimicrobials/Pharmacotherapies Continue vanc and zosyn    Plan for the day/ETD PT/OT/Speech, weaning off oxygen    Code Status:  Family/Goals of Care: Full Code  updated       Critical secondary to Patient has a condition that poses threat to life and bodily function: Severe Respiratory Distress      Critical care was time spent personally by me on the following activities: development of treatment plan with patient or surrogate and bedside caregivers, discussions with consultants, evaluation of patient's response to treatment, examination of patient, ordering and performing treatments and interventions, ordering and review of laboratory studies, ordering and review of radiographic studies, pulse oximetry, re-evaluation of patient's condition. This critical care time did not overlap with that of any other provider or involve time for any procedures.     Sheldon Mcginnis MD  Critical Care Medicine  [facility] - Intensive Care ([place] [id number])

## 2022-01-24 NOTE — ASSESSMENT & PLAN NOTE
- On heparin  - Patient is not on home med for rate control  - consider amiodarone gtt if patient develop sustained AF with RVR

## 2022-01-24 NOTE — PLAN OF CARE
01/24/22 1600   Post-Acute Status   Post-Acute Authorization Placement   Post-Acute Placement Status Referrals Sent   Coverage Medicare A&B and  for Life   Discharge Delays None known at this time   Discharge Plan   Discharge Plan A Rehab   Discharge Plan B Home Health     Morris Underwood LMSW  [facility]  X [id number]  '

## 2022-01-24 NOTE — SUBJECTIVE & OBJECTIVE
Interval History/Significant Events: NAEON. Sat well on 4L NC. Reports being hungry and asks for coffee. Increased mucous production but tolerated secretion well, self suctioning.     Review of Systems   Constitutional: Negative for fever.   HENT: Positive for congestion.    Eyes: Negative for visual disturbance.   Respiratory: Positive for cough and shortness of breath. Negative for choking.    Cardiovascular: Negative for chest pain.   Gastrointestinal: Negative for abdominal distention and abdominal pain.   Genitourinary: Negative for dysuria.   Musculoskeletal: Positive for arthralgias (chronic).   Skin: Negative for rash.   Neurological: Negative for numbness and headaches.     Objective:     Vital Signs (Most Recent):  Temp: 98.5 °F (36.9 °C) (01/24/22 0301)  Pulse: 104 (01/24/22 0900)  Resp: (!) 49 (01/24/22 0900)  BP: 104/60 (01/24/22 0701)  SpO2: 98 % (01/24/22 0900) Vital Signs (24h Range):  Temp:  [98.5 °F (36.9 °C)-100.04 °F (37.8 °C)] 98.5 °F (36.9 °C)  Pulse:  [] 104  Resp:  [19-57] 49  SpO2:  [79 %-100 %] 98 %  BP: ()/(50-81) 104/60   Weight: 68.5 kg (151 lb)  Body mass index is 25.92 kg/m².      Intake/Output Summary (Last 24 hours) at 1/24/2022 1034  Last data filed at 1/24/2022 0900  Gross per 24 hour   Intake 971.55 ml   Output 1140 ml   Net -168.45 ml       Physical Exam  Vitals and nursing note reviewed.   Constitutional:       General: She is not in acute distress.  HENT:      Head: Normocephalic and atraumatic.      Nose: Congestion present.      Mouth/Throat:      Mouth: Mucous membranes are moist.      Pharynx: Oropharynx is clear. No oropharyngeal exudate.   Eyes:      Extraocular Movements: Extraocular movements intact.      Pupils: Pupils are equal, round, and reactive to light.   Cardiovascular:      Rate and Rhythm: Normal rate and regular rhythm.   Pulmonary:      Effort: Pulmonary effort is normal.      Breath sounds: Rhonchi present.   Abdominal:      General: Abdomen is  flat.      Tenderness: There is no abdominal tenderness.   Musculoskeletal:         General: Tenderness (lower legs (chronic)) present. No swelling.      Cervical back: Normal range of motion and neck supple.   Skin:     General: Skin is warm and dry.      Capillary Refill: Capillary refill takes less than 2 seconds.   Neurological:      General: No focal deficit present.      Mental Status: She is alert and oriented to person, place, and time. Mental status is at baseline.         Vents:     Lines/Drains/Airways     Drain                 Urethral Catheter 01/23/22 1037 Double-lumen;Temperature probe 16 Fr. <1 day          Peripheral Intravenous Line                 Peripheral IV - Single Lumen 01/23/22 0000 18 G Right Antecubital 1 day         Peripheral IV - Single Lumen 01/23/22 1030 20 G Right Shoulder 1 day              Significant Labs:    CBC/Anemia Profile:  Recent Labs   Lab 01/23/22  1051 01/23/22  1100 01/23/22  1752 01/24/22  0050 01/24/22  0503   WBC  --    < > 14.49* 9.90 10.11   HGB  --    < > 7.2* 6.2* 6.9*   HCT  --    < > 24.3* 20.7* 23.5*   PLT  --    < > 189 167 205   MCV  --    < > 91 89 91   RDW  --    < > 18.4* 18.2* 18.5*   FERRITIN 680*  --   --   --   --     < > = values in this interval not displayed.        Chemistries:  Recent Labs   Lab 01/23/22  1023 01/24/22  0503    138   K 4.6 5.2*    107   CO2 23 20*   BUN 14 19   CREATININE 0.9 1.0   CALCIUM 8.2* 7.7*   ALBUMIN 2.2* 2.0*   PROT 5.3* 5.0*   BILITOT 1.4* 0.9   ALKPHOS 105 96   ALT 13 11   AST 17 15   MG  --  1.8   PHOS  --  4.5       All pertinent labs within the past 24 hours have been reviewed.    Significant Imaging:  I have reviewed all pertinent imaging results/findings within the past 24 hours.

## 2022-01-24 NOTE — CONSULTS
[facility] Emergency Dept  Critical Care Medicine  Consult Note    Patient Name: Selma Lux  MRN: [id number]  Admission Date: 1/23/2022  Hospital Length of Stay: 0 days  Code Status: Full Code  Attending Physician: Rogelio Mijares MD   Primary Care Provider: Bhargav Hirsch MD   Principal Problem: <principal problem not specified>    Consults  Subjective:     HPI:  Selma Lux is a 84 y.o. 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  She has had encephalopathy during her hospital stay and admission to rehab. She tested positive for COVID yesterday.  Paramedics state that she has been having very poor oral intake.     Collateral information from her daughter.  She notes that she has had difficulty breathing for several months.  She states she has cryptogenic pneumonia and received steroids and breathing treatments for this. 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  She noted that she had been on the ventilator and really hated being on the ventilator.  However, she would want to be intubated if her life depended on it.  She would want to try everything possible to avoid intubation.  She noted that she tested positive for COVID yesterday along with her mother.  She states that she is not able to come see her mother due to coronavirus.  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    ED course: Patient with positive COVID test and respiratory distress. This seems to be a recurrent  problem with her history of cryptogenic organizing pneumonia. Prior chest x-rays seems to have had a similar appearance to the x-ray from today.  Diffuse infiltrates.  This resolved on recent chest x-rays from her admission.  Of note this recent admission is under a different registration under the same name in epic.   Her venous blood gas does not show respiratory failure or CO2 retention. She seems to have encephalopathy but this has been an ongoing issue. Her sodium today is normalized. Patient has markedly infected urine and an elevated white count.  ED started broad-spectrum IV antibiotics.    CT Head 1/23: Mixed density extra-axial collection overlies the right cerebral convexity overall reduced in size from prior compatible with evolving subacute subdural hemorrhage.  No significant new hemorrhage. no definite parenchymal hemorrhage or sulcal effacement to suggest large territory recent infarction.    CT chest 1/23: Interval development of patchy consolidation in both lungs with a bibasilar predominance along with a small right pleural effusion.  Findings most consistent with multifocal pneumonia.  Follow-up to resolution advised. Thyroid gland is diffusely enlarged.  Nodule on the right is stable in size resulting in leftward deviation and mass effect upon the airway.      Hospital/ICU Course:  No notes on file    Past Medical History:   Diagnosis Date    Acute cystitis without hematuria 2/27/2020    Acute hypoxemic respiratory failure 4/11/2018    Acute respiratory failure with hypoxia 3/2/2020    Altered mental status     Anemia     Arthritis     Bilateral hand pain 3/14/2018    Branch retinal vein occlusion, left eye 2/20/2015    Chronic bilateral low back pain without sciatica 3/23/2017    Chronic renal failure in pediatric patient, stage 3 (moderate) 4/15/2018    Cognitive communication deficit 12/19/2017    Cystoid macular edema, left eye 2/20/2015    Cystoid macular edema, left eye  2/20/2015    DJD (degenerative joint disease) of cervical spine 5/15/2013    Fatty liver 8/26/2014    Goiter 4/9/2018    Hashimoto's disease     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    Hypertension     Hypertensive encephalopathy     IBS (irritable bowel syndrome) 6/21/2017    IGT (impaired glucose tolerance) 1/12/2016    Iron deficiency anemia secondary to inadequate dietary iron intake 6/24/2013    Joint pain     Keratoconjunctivitis sicca of both eyes not specified as Sjogren's 7/29/2016    Leiomyoma of uterus, unspecified 9/16/2014    Long QT interval 6/29/2016    Due to medication (plaquenil)     Macular edema 1/12/2015    Multinodular goiter 1/12/2016    Neuropathy 8/23/2017    Plaquenil causing adverse effect in therapeutic use 10/7/2016    Pneumococcal vaccine refused 8/17/2016    Pneumonia due to Streptococcus pneumoniae 4/5/2018    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/2015    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    s/p Right Total knee replacement 5/15/2013    Scoliosis of thoracic spine 5/15/2013    Small vessel disease, cerebrovascular 12/28/2017    Stroke     UTI (urinary tract infection) 12/29/2018    Vascular dementia 12/6/2017       Past Surgical History:   Procedure Laterality Date    BREAST CYST EXCISION      CATARACT EXTRACTION      COLONOSCOPY N/A 9/29/2015    Procedure: COLONOSCOPY;  Surgeon: FIDELINA Sanchez MD;  Location: [address]);  Service: Endoscopy;  Laterality: N/A;    EYE SURGERY      INJECTION OF ANESTHETIC AGENT AROUND NERVE Left 4/19/2021    Procedure: BLOCK, NERVE, FEMORAL AND OBTURATOR;  Surgeon: Shivam Gonzalez MD;  Location: [facility];  Service: Pain Management;  Laterality: Left;  consent needed    JOINT REPLACEMENT      right knee    KNEE SURGERY Left 12/31/2013    TKR    left parotidectomy      mixed tumor    ND EVAL,SWALLOW FUNCTION,CINE/VIDEO RECORD  6/5/2021         SALIVARY GLAND SURGERY      TONSILLECTOMY       UPPER GASTROINTESTINAL ENDOSCOPY         Review of patient's allergies indicates:  No Known Allergies    Family History     Problem Relation (Age of Onset)    Breast cancer Maternal Grandmother    Cancer Father    Heart disease Mother    Hypertension Mother    Hyperthyroidism Mother, Other    Prostate cancer Father        Tobacco Use    Smoking status: Never Smoker    Smokeless tobacco: Never Used   Substance and Sexual Activity    Alcohol use: Yes     Alcohol/week: 0.0 standard drinks     Comment: very seldom     Drug use: No    Sexual activity: Not Currently     Comment: ,  age 52,       Review of Systems   Unable to perform ROS: Mental status change     Objective:     Vital Signs (Most Recent):  Temp: 99.32 °F (37.4 °C) (01/23/22 1315)  Pulse: (!) 112 (01/23/22 1315)  Resp: (!) 24 (01/23/22 1315)  BP: 116/63 (01/23/22 1315)  SpO2: 95 % (01/23/22 1315) Vital Signs (24h Range):  Temp:  [99.32 °F (37.4 °C)-100.1 °F (37.8 °C)] 99.32 °F (37.4 °C)  Pulse:  [112-128] 112  Resp:  [24-30] 24  SpO2:  [93 %-99 %] 95 %  BP: ()/(53-63) 116/63   Weight: 68.5 kg (151 lb)  Body mass index is 25.92 kg/m².      Intake/Output Summary (Last 24 hours) at 1/23/2022 1452  Last data filed at 1/23/2022 1401  Gross per 24 hour   Intake 565.31 ml   Output --   Net 565.31 ml       Physical Exam  Vitals and nursing note reviewed.   Constitutional:       Comments: somnolent but easy to arouse, respiratory distress.   HENT:      Head: Normocephalic and atraumatic.      Nose: Congestion present.      Mouth/Throat:      Mouth: Mucous membranes are dry.      Pharynx: Oropharynx is clear. No oropharyngeal exudate.      Comments: No drooling, no stridor   Eyes:      Extraocular Movements: Extraocular movements intact.      Conjunctiva/sclera: Conjunctivae normal.      Pupils: Pupils are equal, round, and reactive to light.      Comments: Eyes close, pin point pupil    Neck:      Comments: No JVD, no palpable  mass  Cardiovascular:      Rate and Rhythm: Regular rhythm. Tachycardia present.   Pulmonary:      Effort: Respiratory distress present.      Breath sounds: Rhonchi and rales present.   Abdominal:      General: Abdomen is flat.      Palpations: Abdomen is soft.   Musculoskeletal:         General: No swelling or deformity.      Cervical back: Rigidity present.      Comments: BUE 3+ strength, neurovascular intact   BLE 1+ strength, neurovascular intact   Skin:     General: Skin is warm and dry.      Capillary Refill: Capillary refill takes less than 2 seconds.   Neurological:      Mental Status: She is disoriented.      Sensory: No sensory deficit.      Motor: Weakness (BLE) present.      Comments: Oriented to self and place only         Vents:     Lines/Drains/Airways     Drain                 Urethral Catheter 01/23/22 1037 Double-lumen;Temperature probe 16 Fr. <1 day          Peripheral Intravenous Line                 Peripheral IV - Single Lumen 01/23/22 0000 18 G Right Antecubital <1 day         Peripheral IV - Single Lumen 01/23/22 1030 20 G Right Shoulder <1 day              Significant Labs:    CBC/Anemia Profile:  Recent Labs   Lab 01/23/22  1023 01/23/22  1051 01/23/22  1100   WBC 13.48*  --   --    HGB 7.5*  --   --    HCT 25.2*  --  24*     --   --    MCV 90  --   --    RDW 18.1*  --   --    FERRITIN  --  680*  --         Chemistries:  Recent Labs   Lab 01/23/22  1023      K 4.6      CO2 23   BUN 14   CREATININE 0.9   CALCIUM 8.2*   ALBUMIN 2.2*   PROT 5.3*   BILITOT 1.4*   ALKPHOS 105   ALT 13   AST 17       All pertinent labs within the past 24 hours have been reviewed.    Significant Imaging: I have reviewed all pertinent imaging results/findings within the past 24 hours.      ABG  Recent Labs   Lab 01/23/22  1024   PH 7.435   PO2 21*   PCO2 36.7   HCO3 24.6   BE 0     Assessment/Plan:     Pulmonary  COPD exacerbation  - wheezing present on physical exam  - schedule duoneb  q6    Acute hypoxemic respiratory failure  Patient with Hypoxic Respiratory failure which is Acute.  she is not on home oxygen. Supplemental oxygen was provided and noted-  .   Signs/symptoms of respiratory failure include- tachypnea. Contributing diagnoses includes - CHF, COPD and Interstitial lung disease Labs and images were reviewed. Patient Has recent ABG, which has been reviewed. Will treat underlying causes and adjust management of respiratory failure as follows- please see Covid-19    Cardiac/Vascular  Chronic systolic (congestive) heart failure  Patient is identified as having Grade 1 diastolic dysfunction heart failure that is Chronic. CHF is currently controlled. Latest ECHO performed and demonstrates- Results for orders placed during the hospital encounter of 02/27/20    Echo Color Flow Doppler? Yes    Interpretation Summary  · Concentric left ventricular remodeling. Normal left ventricular systolic function. The estimated ejection fraction is 55%.  · Mild left atrial enlargement.  · Grade I (mild) left ventricular diastolic dysfunction consistent with impaired relaxation.  · Normal right ventricular systolic function.  · Mild mitral regurgitation.  · Mild tricuspid regurgitation.  · Normal central venous pressure (3 mmHg).  · The estimated PA systolic pressure is 39 mmHg.    Tachycardia with HR of 110's  . Continue Furosemide and monitor clinical status closely. Monitor on telemetry. Patient is on CHF pathway.  Monitor strict Is&Os and daily weights.  Place on fluid restriction of 1 L. Continue to stress to patient importance of self efficacy and  on diet for CHF. Last BNP reviewed- and noted below   Recent Labs   Lab 01/23/22  1023   *   .      AF (paroxysmal atrial fibrillation)  - On heparin  - Patient is not on home med for rate control  - consider amiodarone gtt if patient develop sustained AF with RVR    Long QT interval  - follow up on EKG  - avoid QT prolongation  meds    Hypertension, essential  - Holding Amlodipine due to hypotension     Renal/  UTI (urinary tract infection)  - ED started vanc and zosyn   - D/c vanc  - Continue zosyn    Chronic renal failure syndrome, stage 3 (moderate)  - stable renal function   - strict I and O  - on lasix 40 mg daily => 20 mg IV    Endocrine  Multinodular goiter  - Enlarged thyroid gland resulting in leftward deviation and mass effect upon the airway  - Prior consultations with surgery, patient lost following up due to multiple intermittent medical events     GI  PUD (peptic ulcer disease)  - Started IV pepcid    Other  Immunocompromised state due to drug therapy  Patient is on chronic prednisone for her , on Plaquenil and Cellcept for RA. Historically prescribe bactrim for PCP prophylaxis   - Per Pharmacy rec, considering Prophylaxis Pentamidine is Aerosolized 300 mg by inhalation via Respirgard II nebulizer every month             Pneumonia due to COVID-19 virus  Admitted with acute hypoxemic respiratory failure 2/2 COVID-19. Vaccination status: vaccinated. COVID+ on 1/23/2022. Course complicated by history of COPD, HF, encephalopathy.    Plan:  - Anticoagulation: heparin  - Antibiotics: zosyn   - Remdesivir x 5 days (1/23)  - Baricitinib x 14 days (1/23), holding Cellcept   - Dexamethasone 6mg x 10 days (1/23), holding prednisone   - Self-proning as tolerated  - On 8L NC, stable blood gas on admission, will start NIV as indicated  - Continuous pulse ox  - Wean supplemental O2 as tolerated  - Trend CRP, LDH, ferritin q48hr  - Airborne + contact precautions        Critical Care Daily Checklist:    A: Awake: RASS Goal/Actual Goal:    Actual:     B: Spontaneous Breathing Trial Performed?     C: SAT & SBT Coordinated?  n/a                      D: Delirium: CAM-ICU     E: Early Mobility Performed? Yes   F: Feeding Goal:    Status:     Current Diet Order   No orders of the defined types were placed in this encounter.      AS:  Analgesia/Sedation n/a   T: Thromboembolic Prophylaxis heparin   H: HOB > 300 Yes   U: Stress Ulcer Prophylaxis (if needed) Pepcid   G: Glucose Control ISS   B: Bowel Function     I: Indwelling Catheter (Lines & Hill) Necessity IV, Hill   D: De-escalation of Antimicrobials/Pharmacotherapies Continue as plan    Plan for the day/ETD Improve oxygenation     Code Status:  Family/Goals of Care: Full Code  updated       Critical secondary to Patient has a condition that poses threat to life and bodily function: Severe Respiratory Distress     Critical care was time spent personally by me on the following activities: development of treatment plan with patient or surrogate and bedside caregivers, discussions with consultants, evaluation of patient's response to treatment, examination of patient, ordering and performing treatments and interventions, ordering and review of laboratory studies, ordering and review of radiographic studies, pulse oximetry, re-evaluation of patient's condition. This critical care time did not overlap with that of any other provider or involve time for any procedures.    Thank you for your consult. I will follow-up with patient. Please contact us if you have any additional questions.     Sheldon Mcginnis MD  Critical Care Medicine  [facility] Dept

## 2022-01-24 NOTE — PROGRESS NOTES
Pharmacokinetic Initial Assessment: IV Vancomycin    Assessment/Plan:  - Patient already received vancomycin 2000 mg (~29 mg/kg) IV x1 loading dose yesterday. Follow this with vancomycin 1000 mg IV x24h.  - Draw trough on 1/25 @1600 prior to 3rd total dose, goal 15-20 mcg/mL.    Pharmacy will continue to follow and monitor vancomycin.      Please contact pharmacy at extension [phone] with any questions regarding this assessment.     Thank you for the consult,   Fred Obrien     Patient brief summary:  Selma Lux is a 86 y.o. female initiated on antimicrobial therapy with IV Vancomycin for treatment of suspected bacteremia    Drug Allergies:   Review of patient's allergies indicates:  No Known Allergies    Actual Body Weight:   68.5 kg    Renal Function:   Estimated Creatinine Clearance: 39.8 mL/min (based on SCr of 1 mg/dL).,     Dialysis Method (if applicable):  N/A

## 2022-01-24 NOTE — ED NOTES
Pt's daughter and primary contact updated on POC via phone. All questions and concerns addressed.

## 2022-01-24 NOTE — PT/OT/SLP EVAL
Occupational Therapy   Evaluation and Treatment with PT     Name: Selma Lux  MRN: [id number]  Admitting Diagnosis:  <principal problem not specified>  Recent Surgery: * No surgery found *      Recommendations:     Discharge Recommendations: rehabilitation facility  Discharge Equipment Recommendations:  other (see comments) (TBD at next level)  Barriers to discharge:  Inaccessible home environment,Decreased caregiver support    Assessment:     Selma Lux is a 86 y.o. female with a medical diagnosis of <principal problem not specified>.  She presents with performance deficits affecting function: weakness,impaired endurance,impaired self care skills,impaired functional mobilty,gait instability,impaired balance,impaired cardiopulmonary response to activity.  Pt would benefit from continued skilled acute OT services in order to maximize independence and safety with ADLs and functional mobility to ensure safe return to PLOF in the least restrictive environment. OT recommending IPR once pt is medically appropriate for d/c.     Rehab Prognosis: Good; patient would benefit from acute skilled OT services to address these deficits and reach maximum level of function.       Plan:     Patient to be seen 3 x/week to address the above listed problems via self-care/home management,therapeutic activities,therapeutic exercises  · Plan of Care Expires: 02/22/22  · Plan of Care Reviewed with: patient    Subjective     Chief Complaint: none stated   Patient/Family Comments/goals: to get better     Occupational Profile:  Living Environment: Pt was living with her daughter in a H with 5 GLORIA and B HRs present. Pt has a tub/shower combo with no DME present. [place] [facility] [place] [facility]   Previous level of function: PTA, pt was (I) with ADLs but requires assistance for bathing. PTA, pt was mod (I) for functional mobility using SPC and RW as needed.   Equipment Used at Home:  walker, rolling,cane,  ARTI miguel  Assistance upon Discharge: Pt will have assistance from family upon d/c.    Pain/Comfort:  · Pain Rating 1: 0/10  · Pain Rating Post-Intervention 1: 0/10    Patients cultural, spiritual, Gnosticist conflicts given the current situation: no    Objective:     Communicated with: RN prior to session.  Patient found HOB elevated with blood pressure cuff,telemetry,pulse ox (continuous),oxygen,parikh catheter,bed alarm upon OT entry to room. Pt agreeable to therapy session.     General Precautions: Standard, fall,airborne,contact,droplet   Orthopedic Precautions:N/A   Braces: N/A  Respiratory Status: Nasal cannula, flow 4 L/min     Oxygen sats ranged form 87-99% with bed mobility and EOB sitting     Occupational Performance:    Bed Mobility:    · Patient completed Rolling/Turning to Right with total assistance  · Patient completed Scooting/Bridging with total assistance  · Patient completed Supine to Sit with total assistance  · Patient completed Sit to Supine with total assistance    Functional Mobility/Transfers:  · Not performed due to increased assistance for EOB sitting balance     Activities of Daily Living:  · Grooming: minimum assistance pt washed face while sitting EOB using R UE   · Lower Body Dressing: total assistance donning B  socks     Cognitive/Visual Perceptual:  Cognitive/Psychosocial Skills:     -       Oriented to: Person, Place and Time   -       Follows Commands/attention:Follows one-step commands  -       Communication: clear/fluent  -       Memory: Poor immediate recall  -       Safety awareness/insight to disability: impaired   -       Mood/Affect/Coping skills/emotional control: Cooperative  Visual/Perceptual:      -Impaired         Physical Exam:  Balance:     Static sit: pt tolerated sitting EOB 5 mins with total assistance   o Posterior lean   o Slouched posture    Dynamic sit: total assistance    Static standing: not performed due to increased assistance for EOB sitting  balance     Postural examination/scapula alignment:    -       Rounded shoulders  -       Forward head  Skin integrity: Visible skin intact and Thin  Edema:  None noted  Sensation:    -       Intact  Dominant hand:    -       right   Upper Extremity Range of Motion:     -       Right Upper Extremity: WFL for AAROM; limited shoulder flexion and abduction   -       Left Upper Extremity: WFL for AAROM  Upper Extremity Strength:    -       Right Upper Extremity: Deficits: grossly 3/5   -       Left Upper Extremity: Deficits: grossly 3/5     AMPAC 6 Click ADL:  AMPAC Total Score: 6    Treatment & Education:   Pt educated on role of OT, POC, and goals for therapy.     POC was dicussed with patient/caregiver, who was included in its development and is in agreement with the identified goals and treatment plan.    Daily orientation provided    Patient and family aware of patient's deficits and therapy progression.    Time provided for therapeutic counseling and discussion of health disposition.    Educated on importance of EOB/OOB mobility, maintaining routine, sitting up in chair, and maximizing independence with ADLs during admission    Pt completed ADLs and functional mobility for treatment session as noted above    Pt/caregiver verbalized understanding and expressed no further concerns/questions.   Updated communication board    Co-evaluation/treatment performed due to patient's multiple deficits requiring two skilled therapists to appropriately and safely assess patient's strength and endurance while facilitating functional tasks in addition to accommodating for patient's activity tolerance.   Education:    Patient left HOB elevated with all lines intact, call button in reach and RN  notified    GOALS:   Multidisciplinary Problems     Occupational Therapy Goals        Problem: Occupational Therapy Goal    Goal Priority Disciplines Outcome Interventions   Occupational Therapy Goal     OT, PT/OT Ongoing,  Progressing    Description: Goals to be met by: 2/7/2022    Patient will increase functional independence with ADLs by performing:    UE Dressing with Minimal Assistance.  Grooming while EOB with Moderate Assistance.  Toileting from bedside commode with Moderate Assistance for hygiene and clothing management.   Sitting at edge of bed x10 minutes with Moderate Assistance.  Toilet transfer to bedside commode with Moderate Assistance.                     History:     Past Medical History:   Diagnosis Date    Acute cystitis without hematuria 2/27/2020    Acute hypoxemic respiratory failure 4/11/2018    Acute respiratory failure with hypoxia 3/2/2020    Altered mental status     Anemia     Arthritis     Bilateral hand pain 3/14/2018    Branch retinal vein occlusion, left eye 2/20/2015    Chronic bilateral low back pain without sciatica 3/23/2017    Chronic renal failure in pediatric patient, stage 3 (moderate) 4/15/2018    Cognitive communication deficit 12/19/2017    Cystoid macular edema, left eye 2/20/2015    Cystoid macular edema, left eye 2/20/2015    DJD (degenerative joint disease) of cervical spine 5/15/2013    Fatty liver 8/26/2014    Goiter 4/9/2018    Hashimoto's disease     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    Hypertension     Hypertensive encephalopathy     IBS (irritable bowel syndrome) 6/21/2017    IGT (impaired glucose tolerance) 1/12/2016    Iron deficiency anemia secondary to inadequate dietary iron intake 6/24/2013    Joint pain     Keratoconjunctivitis sicca of both eyes not specified as Sjogren's 7/29/2016    Leiomyoma of uterus, unspecified 9/16/2014    Long QT interval 6/29/2016    Due to medication (plaquenil)     Macular edema 1/12/2015    Multinodular goiter 1/12/2016    Neuropathy 8/23/2017    Plaquenil causing adverse effect in therapeutic use 10/7/2016    Pneumococcal vaccine refused 8/17/2016    Pneumonia due to Streptococcus pneumoniae 4/5/2018    Primary osteoarthritis  involving multiple joints 10/21/2015    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 ENDO (4TH FLR);  Service: Endoscopy;  Laterality: N/A;    EYE SURGERY      INJECTION OF ANESTHETIC AGENT AROUND NERVE Left 4/19/2021    Procedure: BLOCK, NERVE, FEMORAL AND OBTURATOR;  Surgeon: Shivam Gonzalez MD;  Location: [facility] PAIN MGT;  Service: Pain Management;  Laterality: Left;  consent needed    JOINT REPLACEMENT      right knee    KNEE SURGERY Left 12/31/2013    TKR    left parotidectomy      mixed tumor    MI EVAL,SWALLOW FUNCTION,CINE/VIDEO RECORD  6/5/2021         SALIVARY GLAND SURGERY      TONSILLECTOMY      UPPER GASTROINTESTINAL ENDOSCOPY         Time Tracking:     OT Date of Treatment: 01/24/22  OT Start Time: 0837  OT Stop Time: 0858  OT Total Time (min): 21 min    Billable Minutes:Evaluation 12  Self Care/Home Management 8    1/24/2022

## 2022-01-24 NOTE — ASSESSMENT & PLAN NOTE
- Enlarged thyroid gland resulting in leftward deviation and mass effect upon the airway => anticipate difficult airway access   - Prior consultations with surgery, patient lost following up due to multiple intermittent medical events

## 2022-01-24 NOTE — RESIDENT HANDOFF
Handoff     Primary Team: Networked reference to record PCT  Room Number: 37964/48343 A     Patient Name: Selma Lux MRN: [id number]     Date of Birth: [id number] Allergies: Patient has no known allergies.     Age: 86 y.o. Admit Date: 1/23/2022     Sex: female  BMI: Body mass index is 25.92 kg/m².     Code Status: Full Code        Illness Level (current clinical status): Watcher - No    Reason for Admission: Covid pneumonia, UTI, bacteremia     Brief HPI (pertinent PMH and diagnosis or differential diagnosis): Selma Lux is a 84 y.o. female with hx of HFrEF, COPD, Cryptogenic organizing pneumonia on chronic prednisone, RA on plaquenil and cellcept, HTN, HLD, TIA, vascular dementia, pulmonary HTN secondary to ILD, AF (on Eliquis), recent subdural hematoma that was treated non operatively complicated by a PEA cardiac arrest and hypo natremia who is sent to the ED from [facility] for productive cough for 1 week with associated fevers.  She has had encephalopathy during her hospital stay and admission to rehab. She tested positive for COVID on 1/23    Procedure Date: N/a    Hospital Course (updated, brief assessment by system or problem, significant events): admitted to ICU for respiratory distress and encephalopathy, given medical history, anticipated rapid deterioration. Found to have covid, UTI, bacteremia. Treated with covid-19 protocol, and abx. Maintained proper oxygenation on 4L NC since last night, mentation improved at baseline, stable to transfer to stepdown    Tasks (specific, using if-then statements): PT/OT, complete covid treatment and abx.     Contingency Plan (special circumstances anticipated and plan): dementia worse at night.     Estimated Discharge Date: TBD    Discharge Disposition: Rehab Facility    Mentored By: Dr. Beckford

## 2022-01-24 NOTE — ASSESSMENT & PLAN NOTE
Admitted with acute hypoxemic respiratory failure 2/2 COVID-19. Vaccination status: vaccinated. COVID+ on 1/23/2022. Course complicated by history of COPD, HF, encephalopathy.    Plan:  - Anticoagulation: heparin  - Antibiotics: zosyn   - Remdesivir x 5 days (1/23)  - Baricitinib x 14 days (1/23), holding Cellcept   - Dexamethasone 6mg x 10 days (1/23), holding prednisone   - Self-proning as tolerated  - On 8L NC, stable blood gas on admission, will start NIV as indicated  - Continuous pulse ox  - Wean supplemental O2 as tolerated => on 4L NC since last night => ready for stepdown   - Trend CRP, LDH, ferritin q48hr  - Airborne + contact precautions

## 2022-01-24 NOTE — PLAN OF CARE
CMICU DAILY GOALS       A: Awake    RASS: Goal -    Actual -     Restraint necessity:    B: Breathe   SBT: NA   C: Coordinate A & B, analgesics/sedatives   Pain: managed    SAT: NA  D: Delirium   CAM-ICU: Overall CAM-ICU: Positive  E: Early(intubated/ Progressive (non-intubated) Mobility   MOVE Screen: Fail   Activity: Activity Management: Arm raise - L1  FAS: Feeding/Nutrition   Diet order:  ,    T: Thrombus   DVT prophylaxis: VTE Required Core Measure: Pharmacological prophylaxis initiated/maintained  H: HOB Elevation   Head of Bed (HOB) Positioning: HOB at 30 degrees  U: Ulcer Prophylaxis   GI: yes  G: Glucose control   managed    S: Skin   Bathing/Skin Care: bath, complete,dressed/undressed,incontinence care,linen changed              B: Bowel Function   no issues   I: Indwelling Catheters   Hill necessity:      Urethral Catheter 01/23/22 1037 Double-lumen;Temperature probe 16 Fr.-Reason for Continuing Urinary Catheterization: Critically ill in ICU and requiring hourly monitoring of intake/output   CVC necessity: Yes  D: De-escalation Antibiotics   No    Family/Goals of care/Code Status   Code Status: Full Code    24H Vital Sign Range  Temp:  [98.5 °F (36.9 °C)-100.1 °F (37.8 °C)]   Pulse:  []   Resp:  [19-48]   BP: ()/(50-81)   SpO2:  [93 %-100 %]      Shift Events  Managed heparin gtts.    VS and assessment per flow sheet, patient progressing towards goals as tolerated, plan of care reviewed with Dr. Lux, all concerns addressed, will continue to monitor.

## 2022-01-25 LAB
ALBUMIN SERPL BCP-MCNC: 1.8 G/DL (ref 3.5–5.2)
ALP SERPL-CCNC: 88 U/L (ref 55–135)
ALT SERPL W/O P-5'-P-CCNC: 10 U/L (ref 10–44)
ANION GAP SERPL CALC-SCNC: 10 MMOL/L (ref 8–16)
APTT BLDCRRT: 40.1 SEC (ref 21–32)
APTT BLDCRRT: 54 SEC (ref 21–32)
AST SERPL-CCNC: 21 U/L (ref 10–40)
BACTERIA UR CULT: ABNORMAL
BASOPHILS # BLD AUTO: 0.02 K/UL (ref 0–0.2)
BASOPHILS NFR BLD: 0.2 % (ref 0–1.9)
BILIRUB SERPL-MCNC: 0.9 MG/DL (ref 0.1–1)
BUN SERPL-MCNC: 27 MG/DL (ref 8–23)
CALCIUM SERPL-MCNC: 7.3 MG/DL (ref 8.7–10.5)
CHLORIDE SERPL-SCNC: 110 MMOL/L (ref 95–110)
CO2 SERPL-SCNC: 20 MMOL/L (ref 23–29)
CREAT SERPL-MCNC: 0.9 MG/DL (ref 0.5–1.4)
DIFFERENTIAL METHOD: ABNORMAL
EOSINOPHIL # BLD AUTO: 0.1 K/UL (ref 0–0.5)
EOSINOPHIL NFR BLD: 1.1 % (ref 0–8)
ERYTHROCYTE [DISTWIDTH] IN BLOOD BY AUTOMATED COUNT: 17.7 % (ref 11.5–14.5)
EST. GFR  (AFRICAN AMERICAN): >60 ML/MIN/1.73 M^2
EST. GFR  (NON AFRICAN AMERICAN): 58.9 ML/MIN/1.73 M^2
GLUCOSE SERPL-MCNC: 88 MG/DL (ref 70–110)
HBV CORE AB SERPL QL IA: NEGATIVE
HBV SURFACE AG SERPL QL IA: NEGATIVE
HCT VFR BLD AUTO: 24.8 % (ref 37–48.5)
HGB BLD-MCNC: 8 G/DL (ref 12–16)
IMM GRANULOCYTES # BLD AUTO: 0.2 K/UL (ref 0–0.04)
IMM GRANULOCYTES NFR BLD AUTO: 2 % (ref 0–0.5)
LYMPHOCYTES # BLD AUTO: 1.8 K/UL (ref 1–4.8)
LYMPHOCYTES NFR BLD: 18 % (ref 18–48)
MAGNESIUM SERPL-MCNC: 1.9 MG/DL (ref 1.6–2.6)
MCH RBC QN AUTO: 27.8 PG (ref 27–31)
MCHC RBC AUTO-ENTMCNC: 32.3 G/DL (ref 32–36)
MCV RBC AUTO: 86 FL (ref 82–98)
MONOCYTES # BLD AUTO: 1.6 K/UL (ref 0.3–1)
MONOCYTES NFR BLD: 16.1 % (ref 4–15)
NEUTROPHILS # BLD AUTO: 6.4 K/UL (ref 1.8–7.7)
NEUTROPHILS NFR BLD: 62.6 % (ref 38–73)
NRBC BLD-RTO: 0 /100 WBC
PHOSPHATE SERPL-MCNC: 4 MG/DL (ref 2.7–4.5)
PLATELET # BLD AUTO: 202 K/UL (ref 150–450)
PMV BLD AUTO: 11.1 FL (ref 9.2–12.9)
POTASSIUM SERPL-SCNC: 4.5 MMOL/L (ref 3.5–5.1)
PROT SERPL-MCNC: 4.8 G/DL (ref 6–8.4)
RBC # BLD AUTO: 2.88 M/UL (ref 4–5.4)
SODIUM SERPL-SCNC: 140 MMOL/L (ref 136–145)
VANCOMYCIN TROUGH SERPL-MCNC: 18.6 UG/ML (ref 10–22)
WBC # BLD AUTO: 10.15 K/UL (ref 3.9–12.7)

## 2022-01-25 PROCEDURE — 94761 N-INVAS EAR/PLS OXIMETRY MLT: CPT

## 2022-01-25 PROCEDURE — 20000000 HC ICU ROOM

## 2022-01-25 PROCEDURE — 93005 ELECTROCARDIOGRAM TRACING: CPT

## 2022-01-25 PROCEDURE — 25000003 PHARM REV CODE 250: Performed by: STUDENT IN AN ORGANIZED HEALTH CARE EDUCATION/TRAINING PROGRAM

## 2022-01-25 PROCEDURE — 27000207 HC ISOLATION

## 2022-01-25 PROCEDURE — 93010 EKG 12-LEAD: ICD-10-PCS | Mod: ,,, | Performed by: INTERNAL MEDICINE

## 2022-01-25 PROCEDURE — 85730 THROMBOPLASTIN TIME PARTIAL: CPT | Performed by: STUDENT IN AN ORGANIZED HEALTH CARE EDUCATION/TRAINING PROGRAM

## 2022-01-25 PROCEDURE — 25000242 PHARM REV CODE 250 ALT 637 W/ HCPCS: Performed by: EMERGENCY MEDICINE

## 2022-01-25 PROCEDURE — 93010 ELECTROCARDIOGRAM REPORT: CPT | Mod: ,,, | Performed by: INTERNAL MEDICINE

## 2022-01-25 PROCEDURE — 63600175 PHARM REV CODE 636 W HCPCS: Performed by: STUDENT IN AN ORGANIZED HEALTH CARE EDUCATION/TRAINING PROGRAM

## 2022-01-25 PROCEDURE — 63600175 PHARM REV CODE 636 W HCPCS

## 2022-01-25 PROCEDURE — 25000003 PHARM REV CODE 250: Performed by: EMERGENCY MEDICINE

## 2022-01-25 PROCEDURE — 80202 ASSAY OF VANCOMYCIN: CPT | Performed by: STUDENT IN AN ORGANIZED HEALTH CARE EDUCATION/TRAINING PROGRAM

## 2022-01-25 PROCEDURE — 99900035 HC TECH TIME PER 15 MIN (STAT)

## 2022-01-25 PROCEDURE — 85025 COMPLETE CBC W/AUTO DIFF WBC: CPT

## 2022-01-25 PROCEDURE — 92526 ORAL FUNCTION THERAPY: CPT

## 2022-01-25 PROCEDURE — 84100 ASSAY OF PHOSPHORUS: CPT | Performed by: EMERGENCY MEDICINE

## 2022-01-25 PROCEDURE — 99291 PR CRITICAL CARE, E/M 30-74 MINUTES: ICD-10-PCS | Mod: CR,GC,, | Performed by: STUDENT IN AN ORGANIZED HEALTH CARE EDUCATION/TRAINING PROGRAM

## 2022-01-25 PROCEDURE — 27000221 HC OXYGEN, UP TO 24 HOURS

## 2022-01-25 PROCEDURE — 80053 COMPREHEN METABOLIC PANEL: CPT | Performed by: EMERGENCY MEDICINE

## 2022-01-25 PROCEDURE — 92523 SPEECH SOUND LANG COMPREHEN: CPT

## 2022-01-25 PROCEDURE — 99291 CRITICAL CARE FIRST HOUR: CPT | Mod: CR,GC,, | Performed by: STUDENT IN AN ORGANIZED HEALTH CARE EDUCATION/TRAINING PROGRAM

## 2022-01-25 PROCEDURE — 63600175 PHARM REV CODE 636 W HCPCS: Performed by: EMERGENCY MEDICINE

## 2022-01-25 PROCEDURE — 94640 AIRWAY INHALATION TREATMENT: CPT

## 2022-01-25 PROCEDURE — C9399 UNCLASSIFIED DRUGS OR BIOLOG: HCPCS | Performed by: EMERGENCY MEDICINE

## 2022-01-25 PROCEDURE — 83735 ASSAY OF MAGNESIUM: CPT | Performed by: EMERGENCY MEDICINE

## 2022-01-25 PROCEDURE — 25000003 PHARM REV CODE 250

## 2022-01-25 RX ADMIN — MELATONIN TAB 3 MG 6 MG: 3 TAB at 08:01

## 2022-01-25 RX ADMIN — DEXAMETHASONE SODIUM PHOSPHATE 6 MG: 4 INJECTION INTRA-ARTICULAR; INTRALESIONAL; INTRAMUSCULAR; INTRAVENOUS; SOFT TISSUE at 10:01

## 2022-01-25 RX ADMIN — VANCOMYCIN HYDROCHLORIDE 1000 MG: 1 INJECTION, POWDER, LYOPHILIZED, FOR SOLUTION INTRAVENOUS at 06:01

## 2022-01-25 RX ADMIN — IPRATROPIUM BROMIDE AND ALBUTEROL SULFATE 3 ML: 2.5; .5 SOLUTION RESPIRATORY (INHALATION) at 08:01

## 2022-01-25 RX ADMIN — IPRATROPIUM BROMIDE AND ALBUTEROL SULFATE 3 ML: 2.5; .5 SOLUTION RESPIRATORY (INHALATION) at 12:01

## 2022-01-25 RX ADMIN — DEXMEDETOMIDINE HYDROCHLORIDE 0.3 MCG/KG/HR: 4 INJECTION, SOLUTION INTRAVENOUS at 12:01

## 2022-01-25 RX ADMIN — REMDESIVIR 100 MG: 100 INJECTION, POWDER, LYOPHILIZED, FOR SOLUTION INTRAVENOUS at 09:01

## 2022-01-25 RX ADMIN — BARICITINIB 2 MG: 2 TABLET, FILM COATED ORAL at 09:01

## 2022-01-25 RX ADMIN — MUPIROCIN: 20 OINTMENT TOPICAL at 09:01

## 2022-01-25 RX ADMIN — FUROSEMIDE 20 MG: 10 INJECTION, SOLUTION INTRAVENOUS at 09:01

## 2022-01-25 RX ADMIN — PANTOPRAZOLE SODIUM 40 MG: 40 TABLET, DELAYED RELEASE ORAL at 09:01

## 2022-01-25 RX ADMIN — IPRATROPIUM BROMIDE AND ALBUTEROL SULFATE 3 ML: 2.5; .5 SOLUTION RESPIRATORY (INHALATION) at 07:01

## 2022-01-25 RX ADMIN — PIPERACILLIN AND TAZOBACTAM 4.5 G: 4; .5 INJECTION, POWDER, LYOPHILIZED, FOR SOLUTION INTRAVENOUS; PARENTERAL at 10:01

## 2022-01-25 RX ADMIN — PIPERACILLIN AND TAZOBACTAM 4.5 G: 4; .5 INJECTION, POWDER, LYOPHILIZED, FOR SOLUTION INTRAVENOUS; PARENTERAL at 02:01

## 2022-01-25 RX ADMIN — PIPERACILLIN AND TAZOBACTAM 4.5 G: 4; .5 INJECTION, POWDER, LYOPHILIZED, FOR SOLUTION INTRAVENOUS; PARENTERAL at 08:01

## 2022-01-25 RX ADMIN — MUPIROCIN: 20 OINTMENT TOPICAL at 08:01

## 2022-01-25 NOTE — SUBJECTIVE & OBJECTIVE
Interval History/Significant Events: NAEON. Sat well on RA, no longer on precedex gtt. Oriented to self only.     Review of Systems   Unable to perform ROS: Dementia     Objective:     Vital Signs (Most Recent):  Temp: 97.7 °F (36.5 °C) (01/25/22 1100)  Pulse: 94 (01/25/22 1206)  Resp: (!) 62 (01/25/22 1206)  BP: (!) 108/59 (01/25/22 1200)  SpO2: (!) 93 % (01/25/22 1206) Vital Signs (24h Range):  Temp:  [97 °F (36.1 °C)-98 °F (36.7 °C)] 97.7 °F (36.5 °C)  Pulse:  [] 94  Resp:  [11-62] 62  SpO2:  [92 %-100 %] 93 %  BP: ()/(51-66) 108/59   Weight: 68.5 kg (151 lb)  Body mass index is 25.92 kg/m².      Intake/Output Summary (Last 24 hours) at 1/25/2022 1256  Last data filed at 1/25/2022 1100  Gross per 24 hour   Intake 1135.21 ml   Output 1105 ml   Net 30.21 ml       Physical Exam  Vitals and nursing note reviewed.   Constitutional:       General: She is sleeping. She is not in acute distress.     Appearance: Normal appearance.   HENT:      Head: Normocephalic and atraumatic.      Nose: Nose normal. No congestion.      Mouth/Throat:      Mouth: Mucous membranes are moist.      Pharynx: Oropharynx is clear.   Eyes:      Extraocular Movements: Extraocular movements intact.      Pupils: Pupils are equal, round, and reactive to light.   Cardiovascular:      Rate and Rhythm: Normal rate and regular rhythm.   Pulmonary:      Effort: Pulmonary effort is normal.      Breath sounds: Rhonchi present.   Abdominal:      General: Abdomen is flat.      Palpations: Abdomen is soft.   Musculoskeletal:         General: Tenderness (chronic RA) present. No swelling.      Cervical back: Normal range of motion and neck supple.   Skin:     General: Skin is warm and dry.      Capillary Refill: Capillary refill takes less than 2 seconds.   Neurological:      Mental Status: She is easily aroused. Mental status is at baseline. She is disoriented.         Vents:     Lines/Drains/Airways     Drain                 Urethral Catheter  01/23/22 1037 Double-lumen;Temperature probe 16 Fr. 2 days          Peripheral Intravenous Line                 Peripheral IV - Single Lumen 01/23/22 0000 18 G Right Antecubital 2 days         Peripheral IV - Single Lumen 01/23/22 1030 20 G Right Shoulder 2 days         Midline Catheter Insertion/Assessment  - Single Lumen 01/24/22 1537 Left brachial vein 18g x 8cm <1 day              Significant Labs:    CBC/Anemia Profile:  Recent Labs   Lab 01/24/22  0050 01/24/22  0503 01/25/22  0458   WBC 9.90 10.11 10.15   HGB 6.2* 6.9* 8.0*   HCT 20.7* 23.5* 24.8*    205 202   MCV 89 91 86   RDW 18.2* 18.5* 17.7*        Chemistries:  Recent Labs   Lab 01/24/22  0503 01/25/22  0458    140   K 5.2* 4.5    110   CO2 20* 20*   BUN 19 27*   CREATININE 1.0 0.9   CALCIUM 7.7* 7.3*   ALBUMIN 2.0* 1.8*   PROT 5.0* 4.8*   BILITOT 0.9 0.9   ALKPHOS 96 88   ALT 11 10   AST 15 21   MG 1.8 1.9   PHOS 4.5 4.0       All pertinent labs within the past 24 hours have been reviewed.    Significant Imaging:  I have reviewed all pertinent imaging results/findings within the past 24 hours.

## 2022-01-25 NOTE — PLAN OF CARE
Problem: SLP Goal  Goal: SLP Goal  Description: Speech Language Pathology Goals  Goals expected to be met by 1/31:  1. Patient will tolerate a dysphagia soft diet and thin liquids with no overt signs of airway compromise.   2. Patient will complete functional memory tasks with 70% acc given  min A.   3. Patient will orient x4 indep.   4. Patient will complete general problem solving tasks with 60% acc given  min A.   5. Patient will participate in further assessments as approp.   Outcome: Ongoing, Progressing     Goals implemented.

## 2022-01-25 NOTE — ASSESSMENT & PLAN NOTE
- Enlarged thyroid gland resulting in leftward deviation and mass effect upon the airway => anticipate difficult airway access   - Prior consultations with surgery, patient lost following up due to multiple recent medical events

## 2022-01-25 NOTE — PT/OT/SLP EVAL
Speech Language Pathology Evaluation  Cognitive Communication    Patient Name:  Selma Lux   MRN:  [id number]  Admitting Diagnosis: Acute hypoxemic respiratory failure    Recommendations:     Recommendations:                General Recommendations:  Dysphagia therapy and Cognitive-linguistic therapy  Diet recommendations:  Dental Soft, Thin   Aspiration Precautions: 1 bite/sip at a time, Assistance with meals, Avoid talking while eating, Eliminate distractions, Feed only when awake/alert, HOB to 90 degrees, Meds whole 1 at a time, Remain upright 30 minutes post meal and Small bites/sips   General Precautions: Standard, fall,droplet,contact  Communication strategies:  go to room if call light pushed    History:     Past Medical History:   Diagnosis Date    Acute cystitis without hematuria 2/27/2020    Acute hypoxemic respiratory failure 4/11/2018    Acute respiratory failure with hypoxia 3/2/2020    Altered mental status     Anemia     Arthritis     Bilateral hand pain 3/14/2018    Branch retinal vein occlusion, left eye 2/20/2015    Chronic bilateral low back pain without sciatica 3/23/2017    Chronic renal failure in pediatric patient, stage 3 (moderate) 4/15/2018    Cognitive communication deficit 12/19/2017    Cystoid macular edema, left eye 2/20/2015    Cystoid macular edema, left eye 2/20/2015    DJD (degenerative joint disease) of cervical spine 5/15/2013    Fatty liver 8/26/2014    Goiter 4/9/2018    Hashimoto's disease     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    Hypertension     Hypertensive encephalopathy     IBS (irritable bowel syndrome) 6/21/2017    IGT (impaired glucose tolerance) 1/12/2016    Iron deficiency anemia secondary to inadequate dietary iron intake 6/24/2013    Joint pain     Keratoconjunctivitis sicca of both eyes not specified as Sjogren's 7/29/2016    Leiomyoma of uterus, unspecified 9/16/2014    Long QT interval 6/29/2016    Due to medication (plaquenil)     Macular  edema 1/12/2015    Multinodular goiter 1/12/2016    Neuropathy 8/23/2017    Plaquenil causing adverse effect in therapeutic use 10/7/2016    Pneumococcal vaccine refused 8/17/2016    Pneumonia due to Streptococcus pneumoniae 4/5/2018    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/2015    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    s/p Right Total knee replacement 5/15/2013    Scoliosis of thoracic spine 5/15/2013    Small vessel disease, cerebrovascular 12/28/2017    Stroke     UTI (urinary tract infection) 12/29/2018    Vascular dementia 12/6/2017       Past Surgical History:   Procedure Laterality Date    BREAST CYST EXCISION      CATARACT EXTRACTION      COLONOSCOPY N/A 9/29/2015    Procedure: COLONOSCOPY;  Surgeon: FIDELINA Sanchez MD;  Location: [facility] ENDO ([address] [place] [address]);  Service: Endoscopy;  Laterality: N/A;    EYE SURGERY      INJECTION OF ANESTHETIC AGENT AROUND NERVE Left 4/19/2021    Procedure: BLOCK, NERVE, FEMORAL AND OBTURATOR;  Surgeon: Shivam Gonzalez MD;  Location: [facility] PAIN MGT;  Service: Pain Management;  Laterality: Left;  consent needed    JOINT REPLACEMENT      right knee    KNEE SURGERY Left 12/31/2013    TKR    left parotidectomy      mixed tumor    RI EVAL,SWALLOW FUNCTION,CINE/VIDEO RECORD  6/5/2021         SALIVARY GLAND SURGERY      TONSILLECTOMY      UPPER GASTROINTESTINAL ENDOSCOPY       Subjective     Patient asleep, roused given min A.  Patient with eyes closed essentially throughout session, though responding appropriately.     Pain/Comfort:  ·   No c/o pain.     Respiratory Status: Nasal cannula, flow . L/min    Objective:   Cognitive Status:    Arousal/Alertness Appropriate response to stimuli  Attention benefits from mild cues to task at hand  Perseveration Not present  Orientation Person and Place  Memory Immediate Recall WFL, Delayedpoor, patient recalled 0/3 unassociated words after 1 minute filled delay and recall general information  delayed  Problem Solving Sequencing 0% acc, Solutions to hypothetical situations completed with 100% acc and Compare/contrast completed with 75% acc   Patient provided 6 items within concrete category indep, (norm 15-20)     Receptive Language:   Comprehension:      Questions Simple yes/no WFL  Complex yes/no WFL  Commands  two step basic commands WFL  multistep basic commands WFL      Expressive Language:  Verbal:    Automatic Speech  Counting WFL and Days of the week WFL  Naming Confrontation WFL and Single word responsive naming WFL  Conversational speech .        Motor Speech:  Dysarthria .    Voice:   clear    Visual-Spatial:  TBD    Reading:   TBD     Written Expression:   TBD    Treatment: patient tolerated thin liquids via straw sips x6 with no overt signs of airway compromise.  Patient with prolonged mastication of solid trials, and overall poor bolus formation/cohesion.  Would recommend mechancial soft diet and thin liquids at this time, which is a downgrade from previous assessment. However, patient with greater oral phase of swallow deficits appreciated this service date. Skilled education was provided to patient  re: diet recs, standard aspiration precautions of which to follow, and ongoing ST plan of care. Recommend reinforcement.     Assessment:   Selma Lux is a 86 y.o. female with an SLP diagnosis of Dysphagia and Cognitive-Linguistic Impairment.      Goals:   Multidisciplinary Problems     SLP Goals        Problem: SLP Goal    Goal Priority Disciplines Outcome   SLP Goal     SLP Ongoing, Progressing   Description: Speech Language Pathology Goals  Goals expected to be met by 1/31:  1. Patient will tolerate a dysphagia soft diet and thin liquids with no overt signs of airway compromise.   2. Patient will complete functional memory tasks with 70% acc given  min A.   3. Patient will orient x4 indep.   4. Patient will complete general problem solving tasks with 60% acc given  min A.   5.  Patient will participate in further assessments as approp.                            Plan:   · Patient to be seen:  4 x/week   · Plan of Care expires:     · Plan of Care reviewed with:  patient   · SLP Follow-Up:  Yes       Discharge recommendations:  Discharge Facility/Level of Care Needs: rehabilitation facility   Barriers to Discharge:  None    Time Tracking:   SLP Treatment Date:   01/25/22  Speech Start Time:  0910  Speech Stop Time:  0926     Speech Total Time (min):  16 min    Billable Minutes: Eval 8  and Treatment Swallowing Dysfunction 8    01/25/2022

## 2022-01-25 NOTE — PLAN OF CARE
Received a call from Kaitlin Henry, liaison for [facility]/# [phone], who conveyed that they can accept pt once she is medically ready.  Pt is currently receiving tx for CoVid and on IV precedex.  Per CM, pt family doesn't want to return to [facility] and Kaitlin will contact dgtr once pt is closer to being medically ready for discharge.     Morris Underwood LMSW  [facility]  [phone]

## 2022-01-25 NOTE — PLAN OF CARE
[facility] ([place] [address])  Discharge Reassessment    Primary Care Provider: Bhargav Hirsch MD    Expected Discharge Date: 1/28/2022     Patient not medically ready for discharge per MD.    Reassessment (most recent)     Discharge Reassessment - 01/25/22 1153        Discharge Reassessment    Assessment Type Discharge Planning Reassessment     Did the patient's condition or plan change since previous assessment? No     Discharge Plan A Rehab     Discharge Plan B Skilled Nursing Facility;Home with family     Discharge Barriers Identified None     Why the patient remains in the hospital Requires continued medical care        Post-Acute Status    Post-Acute Authorization Placement     Post-Acute Placement Status Pending medical clearance/testing     Discharge Delays None known at this time               Mattie Kaminski RN     [phone]

## 2022-01-25 NOTE — PROGRESS NOTES
[facility] ([place] [address])  Critical Care Medicine  Progress Note    Patient Name: Selma Lux  MRN: [id number]  Admission Date: 1/23/2022  Hospital Length of Stay: 2 days  Code Status: Full Code  Attending Provider: Cristian Beckford MD  Primary Care Provider: Bhargav Hirsch MD   Principal Problem: Acute hypoxemic respiratory failure    Subjective:     HPI:  Selma Lux is a 84 y.o. 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  She has had encephalopathy during her hospital stay and admission to rehab. She tested positive for COVID yesterday.  Paramedics state that she has been having very poor oral intake.     Collateral information from her daughter.  She notes that she has had difficulty breathing for several months.  She states she has cryptogenic pneumonia and received steroids and breathing treatments for this. 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  She noted that she had been on the ventilator and really hated being on the ventilator.  However, she would want to be intubated if her life depended on it.  She would want to try everything possible to avoid intubation.  She noted that she tested positive for COVID yesterday along with her mother.  She states that she is not able to come see her mother due to coronavirus.  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    ED course: Patient with positive COVID test and respiratory distress. This seems to be a recurrent  problem with her history of cryptogenic organizing pneumonia. Prior chest x-rays seems to have had a similar appearance to the x-ray from today.  Diffuse infiltrates.  This resolved on recent chest x-rays from her admission.  Of note this recent admission is under a different registration under the same name in epic.   Her venous blood gas does not show respiratory failure or CO2 retention. She seems to have encephalopathy but this has been an ongoing issue. Her sodium today is normalized. Patient has markedly infected urine and an elevated white count.  ED started broad-spectrum IV antibiotics.    CT Head 1/23: Mixed density extra-axial collection overlies the right cerebral convexity overall reduced in size from prior compatible with evolving subacute subdural hemorrhage.  No significant new hemorrhage. no definite parenchymal hemorrhage or sulcal effacement to suggest large territory recent infarction.    CT chest 1/23: Interval development of patchy consolidation in both lungs with a bibasilar predominance along with a small right pleural effusion.  Findings most consistent with multifocal pneumonia.  Follow-up to resolution advised. Thyroid gland is diffusely enlarged.  Nodule on the right is stable in size resulting in leftward deviation and mass effect upon the airway.      Hospital/ICU Course:  Selma Lux is a 84 y.o. 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  She has had encephalopathy during her hospital stay and admission to rehab. She tested positive for COVID on 1/23.  Paramedics state that she has been having very poor oral intake.  Patient was admitted to ICU for hypotension and respiratory distress, anticipating rapid deterioration 2/2 her medical history. Patient is on 4L NC, SpO2 stable, she had episode of hypotensions but not sustained, no pressor indicated. Mentation improved in the morning. Patient required sedation for procedure. Family visit seemed to calm her down. Patient no longer required Precedex gtt. Qtc prolongation resolved, likely 2/2 to chronic plaquenil. Patient sat well on room air. Patient ready to transfer to lower level of care unit      Interval History/Significant Events: NAEON. Sat well on RA, no longer on precedex gtt. Oriented to self only.     Review of Systems   Unable to perform ROS: Dementia     Objective:     Vital Signs (Most Recent):  Temp: 97.7 °F (36.5 °C) (01/25/22 1100)  Pulse: 94 (01/25/22 1206)  Resp: (!) 62 (01/25/22 1206)  BP: (!) 108/59 (01/25/22 1200)  SpO2: (!) 93 % (01/25/22 1206) Vital Signs (24h Range):  Temp:  [97 °F (36.1 °C)-98 °F (36.7 °C)] 97.7 °F (36.5 °C)  Pulse:  [] 94  Resp:  [11-62] 62  SpO2:  [92 %-100 %] 93 %  BP: ()/(51-66) 108/59   Weight: 68.5 kg (151 lb)  Body mass index is 25.92 kg/m².      Intake/Output Summary (Last 24 hours) at 1/25/2022 1256  Last data filed at 1/25/2022 1100  Gross per 24 hour   Intake 1135.21 ml   Output 1105 ml   Net 30.21 ml       Physical Exam  Vitals and nursing note reviewed.   Constitutional:       General: She is sleeping. She is not in acute distress.     Appearance: Normal appearance.   HENT:      Head: Normocephalic and atraumatic.      Nose: Nose normal. No congestion.      Mouth/Throat:      Mouth: Mucous membranes are moist.      Pharynx: Oropharynx is clear.   Eyes:      Extraocular Movements: Extraocular movements intact.      Pupils: Pupils are equal, round, and reactive to light.   Cardiovascular:      Rate and Rhythm: Normal rate and regular rhythm.   Pulmonary:      Effort: Pulmonary effort is normal.      Breath sounds:  Rhonchi present.   Abdominal:      General: Abdomen is flat.      Palpations: Abdomen is soft.   Musculoskeletal:         General: Tenderness (chronic RA) present. No swelling.      Cervical back: Normal range of motion and neck supple.   Skin:     General: Skin is warm and dry.      Capillary Refill: Capillary refill takes less than 2 seconds.   Neurological:      Mental Status: She is easily aroused. Mental status is at baseline. She is disoriented.         Vents:     Lines/Drains/Airways     Drain                 Urethral Catheter 01/23/22 1037 Double-lumen;Temperature probe 16 Fr. 2 days          Peripheral Intravenous Line                 Peripheral IV - Single Lumen 01/23/22 0000 18 G Right Antecubital 2 days         Peripheral IV - Single Lumen 01/23/22 1030 20 G Right Shoulder 2 days         Midline Catheter Insertion/Assessment  - Single Lumen 01/24/22 1537 Left brachial vein 18g x 8cm <1 day              Significant Labs:    CBC/Anemia Profile:  Recent Labs   Lab 01/24/22  0050 01/24/22  0503 01/25/22  0458   WBC 9.90 10.11 10.15   HGB 6.2* 6.9* 8.0*   HCT 20.7* 23.5* 24.8*    205 202   MCV 89 91 86   RDW 18.2* 18.5* 17.7*        Chemistries:  Recent Labs   Lab 01/24/22  0503 01/25/22  0458    140   K 5.2* 4.5    110   CO2 20* 20*   BUN 19 27*   CREATININE 1.0 0.9   CALCIUM 7.7* 7.3*   ALBUMIN 2.0* 1.8*   PROT 5.0* 4.8*   BILITOT 0.9 0.9   ALKPHOS 96 88   ALT 11 10   AST 15 21   MG 1.8 1.9   PHOS 4.5 4.0       All pertinent labs within the past 24 hours have been reviewed.    Significant Imaging:  I have reviewed all pertinent imaging results/findings within the past 24 hours.      ABG  Recent Labs   Lab 01/23/22  1024   PH 7.435   PO2 21*   PCO2 36.7   HCO3 24.6   BE 0     Assessment/Plan:     Pulmonary  * Acute hypoxemic respiratory failure  Patient with Hypoxic Respiratory failure which is Acute.  she is not on home oxygen. Supplemental oxygen was provided and noted-  .    Signs/symptoms of respiratory failure include- tachypnea. Contributing diagnoses includes - CHF, COPD and Interstitial lung disease Labs and images were reviewed. Patient Has recent ABG, which has been reviewed. Will treat underlying causes and adjust management of respiratory failure as follows- please see Covid-19    - Resolved. Patient is sat well on room air    COPD exacerbation  - wheezing present on physical exam  - schedule duoneb q6    Cardiac/Vascular  Chronic systolic (congestive) heart failure  Patient is identified as having Grade 1 diastolic dysfunction heart failure that is Chronic. CHF is currently controlled. Latest ECHO performed and demonstrates- Results for orders placed during the hospital encounter of 02/27/20    Echo Color Flow Doppler? Yes    Interpretation Summary  · Concentric left ventricular remodeling. Normal left ventricular systolic function. The estimated ejection fraction is 55%.  · Mild left atrial enlargement.  · Grade I (mild) left ventricular diastolic dysfunction consistent with impaired relaxation.  · Normal right ventricular systolic function.  · Mild mitral regurgitation.  · Mild tricuspid regurgitation.  · Normal central venous pressure (3 mmHg).  · The estimated PA systolic pressure is 39 mmHg.    Tachycardia with HR of 110's  . Continue Furosemide and monitor clinical status closely. Monitor on telemetry. Patient is on CHF pathway.  Monitor strict Is&Os and daily weights.  Place on fluid restriction of 1 L. Continue to stress to patient importance of self efficacy and  on diet for CHF. Last BNP reviewed- and noted below   Recent Labs   Lab 01/23/22  1023   *   .      AF (paroxysmal atrial fibrillation)  - On heparin gtt  - Patient is not on home med for rate control  - consider amiodarone gtt if patient develop sustained AF with RVR    - Patient is on chronic Eliquis for a fib with a [id number] Score of 7. From chart review, patient recently sustained a  SDH from a fall, Eliquis was hold then resumed per neurosurgery receommendation on 12/28. Patient is on heparin gtt on this admission due to her initial presentation was not compatible for PO intake. Decision to keep her on heparin gtt was made, considering her recent brain bleed. Heparin gtt is short acting, able to clear off immediately incase of hemorrhagic events.     Long QT interval  - follow up on EKG  - repeat EKG, QTc 416. QT prolongation likely from chronic plaquenil use    Hypertension, essential  - Holding Amlodipine due to hypotension     Renal/  UTI (urinary tract infection)  - ED started vanc and zosyn   - D/c vanc => restarted due to bacteremia with strep  - Continue zosyn    Chronic renal failure syndrome, stage 3 (moderate)  - stable renal function   - strict I and O  - on lasix 40 mg daily => 20 mg IV    Endocrine  Multinodular goiter  - Enlarged thyroid gland resulting in leftward deviation and mass effect upon the airway => anticipate difficult airway access   - Prior consultations with surgery, patient lost following up due to multiple recent medical events     GI  Oropharyngeal dysphagia  - Evaluated by SLP:   Diet recommendations:  Dental Soft, Thin   Aspiration Precautions: 1 bite/sip at a time, Assistance with meals, Avoid talking while eating, Eliminate distractions, Feed only when awake/alert, HOB to 90 degrees, Meds whole 1 at a time, Remain upright 30 minutes post meal and Small bites/sips     - Diet ordered      PUD (peptic ulcer disease)  - On pantoprazole     Other  Immunocompromised state due to drug therapy  Patient is on chronic prednisone for her , on Plaquenil and Cellcept for RA. Historically prescribe bactrim for PCP prophylaxis   - Per Pharmacy rec, considering Prophylaxis Pentamidine is Aerosolized 300 mg by inhalation via Respirgard II nebulizer every month               Pneumonia due to COVID-19 virus  Admitted with acute hypoxemic respiratory failure 2/2 COVID-19.  Vaccination status: vaccinated. COVID+ on 1/23/2022. Course complicated by history of COPD, HF, encephalopathy.    Plan:  - Anticoagulation: heparin  - Antibiotics: zosyn   - Remdesivir x 5 days (1/23), holding plaquenil (QT prolongation and negative effect to remdesivir)   - Baricitinib x 14 days (1/23), holding Cellcept   - Dexamethasone 6mg x 10 days (1/23), holding prednisone   - Self-proning as tolerated  - On 8L NC, stable blood gas on admission, will start NIV as indicated  - Continuous pulse ox  - Wean supplemental O2 as tolerated => on 4L NC since last night => on room air 1/25  - Trend CRP, LDH, ferritin q48hr  - Airborne + contact precautions       Critical Care Daily Checklist:    A: Awake: RASS Goal/Actual Goal: RASS Goal: 0-->alert and calm  Actual: Monzon Agitation Sedation Scale (RASS): Restless   B: Spontaneous Breathing Trial Performed?     C: SAT & SBT Coordinated?  n/a                      D: Delirium: CAM-ICU Overall CAM-ICU: Positive   E: Early Mobility Performed? Yes   F: Feeding Goal:    Status:     Current Diet Order   Procedures    Diet Dysphagia Mechanical Soft (IDDSI Level 5)      AS: Analgesia/Sedation none   T: Thromboembolic Prophylaxis Heparin gtt   H: HOB > 300 Yes   U: Stress Ulcer Prophylaxis (if needed) PPI   G: Glucose Control ISS   B: Bowel Function Stool Occurrence: 1   I: Indwelling Catheter (Lines & Parikh) Necessity IV, midline, parikh   D: De-escalation of Antimicrobials/Pharmacotherapies Continue as plan    Plan for the day/ETD Transfer to hospital medicine    Code Status:  Family/Goals of Care: Full Code  updated       Critical secondary to Patient has a condition that poses threat to life and bodily function: Severe Respiratory Distress      Critical care was time spent personally by me on the following activities: development of treatment plan with patient or surrogate and bedside caregivers, discussions with consultants, evaluation of patient's response to treatment,  examination of patient, ordering and performing treatments and interventions, ordering and review of laboratory studies, ordering and review of radiographic studies, pulse oximetry, re-evaluation of patient's condition. This critical care time did not overlap with that of any other provider or involve time for any procedures.     Sheldon Mcginnis MD  Critical Care Medicine  [facility] - Intensive Care ([place] [address])

## 2022-01-25 NOTE — ASSESSMENT & PLAN NOTE
- Evaluated by SLP:   Diet recommendations:  Dental Soft, Thin   Aspiration Precautions: 1 bite/sip at a time, Assistance with meals, Avoid talking while eating, Eliminate distractions, Feed only when awake/alert, HOB to 90 degrees, Meds whole 1 at a time, Remain upright 30 minutes post meal and Small bites/sips     - Diet ordered

## 2022-01-25 NOTE — ASSESSMENT & PLAN NOTE
Admitted with acute hypoxemic respiratory failure 2/2 COVID-19. Vaccination status: vaccinated. COVID+ on 1/23/2022. Course complicated by history of COPD, HF, encephalopathy.    Plan:  - Anticoagulation: heparin  - Antibiotics: zosyn   - Remdesivir x 5 days (1/23), holding plaquenil (QT prolongation and negative effect to remdesivir)   - Baricitinib x 14 days (1/23), holding Cellcept   - Dexamethasone 6mg x 10 days (1/23), holding prednisone   - Self-proning as tolerated  - On 8L NC, stable blood gas on admission, will start NIV as indicated  - Continuous pulse ox  - Wean supplemental O2 as tolerated => on 4L NC since last night => on room air 1/25  - Trend CRP, LDH, ferritin q48hr  - Airborne + contact precautions

## 2022-01-25 NOTE — CONSULTS
[facility] ([place] [address])  Wound Care    Patient Name:  Selma Lux   MRN:  [id number]  Date: 1/25/2022  Diagnosis: Acute hypoxemic respiratory failure    History:     Past Medical History:   Diagnosis Date    Acute cystitis without hematuria 2/27/2020    Acute hypoxemic respiratory failure 4/11/2018    Acute respiratory failure with hypoxia 3/2/2020    Altered mental status     Anemia     Arthritis     Bilateral hand pain 3/14/2018    Branch retinal vein occlusion, left eye 2/20/2015    Chronic bilateral low back pain without sciatica 3/23/2017    Chronic renal failure in pediatric patient, stage 3 (moderate) 4/15/2018    Cognitive communication deficit 12/19/2017    Cystoid macular edema, left eye 2/20/2015    Cystoid macular edema, left eye 2/20/2015    DJD (degenerative joint disease) of cervical spine 5/15/2013    Fatty liver 8/26/2014    Goiter 4/9/2018    Hashimoto's disease     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    Hypertension     Hypertensive encephalopathy     IBS (irritable bowel syndrome) 6/21/2017    IGT (impaired glucose tolerance) 1/12/2016    Iron deficiency anemia secondary to inadequate dietary iron intake 6/24/2013    Joint pain     Keratoconjunctivitis sicca of both eyes not specified as Sjogren's 7/29/2016    Leiomyoma of uterus, unspecified 9/16/2014    Long QT interval 6/29/2016    Due to medication (plaquenil)     Macular edema 1/12/2015    Multinodular goiter 1/12/2016    Neuropathy 8/23/2017    Plaquenil causing adverse effect in therapeutic use 10/7/2016    Pneumococcal vaccine refused 8/17/2016    Pneumonia due to Streptococcus pneumoniae 4/5/2018    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/2015    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    s/p Right Total knee replacement 5/15/2013    Scoliosis of thoracic spine 5/15/2013    Small vessel disease, cerebrovascular 12/28/2017    Stroke     UTI (urinary tract infection)  12/29/2018    Vascular dementia 12/6/2017       Social History     Socioeconomic History    Marital status:    Tobacco Use    Smoking status: Never Smoker    Smokeless tobacco: Never Used   Substance and Sexual Activity    Alcohol use: Yes     Alcohol/week: 0.0 standard drinks     Comment: very seldom     Drug use: No    Sexual activity: Not Currently     Comment: ,  age 52,    Other Topics Concern    Are you pregnant or think you may be? No    Breast-feeding No     Social Determinants of Health     Financial Resource Strain: Low Risk     Difficulty of Paying Living Expenses: Not hard at all   Food Insecurity: No Food Insecurity    Worried About Running Out of Food in the Last Year: Never true    Ran Out of Food in the Last Year: Never true   Transportation Needs: No Transportation Needs    Lack of Transportation (Medical): No    Lack of Transportation (Non-Medical): No   Physical Activity: Insufficiently Active    Days of Exercise per Week: 2 days    Minutes of Exercise per Session: 60 min   Stress: No Stress Concern Present    Feeling of Stress : Only a little   Social Connections: Moderately Integrated    Frequency of Communication with Friends and Family: More than three times a week    Frequency of Social Gatherings with Friends and Family: More than three times a week    Attends Hindu Services: More than 4 times per year    Active Member of Clubs or Organizations: Yes    Attends Club or Organization Meetings: More than 4 times per year    Marital Status:        Precautions:   COVID -19+   Airborne, contact and droplet isolation  Allergies as of 01/23/2022    (No Known Allergies)       [facility] Assessment Details/Treatment   Wound care consulted for MASD to marialuisa-anal area by RN  Dr. Lux is refusing skin assessment. 'I'm not ready for any assessment. NO!  I don't want to discuss my skin at this time.'  Dr. Lux has been having frequent incontinence/diarrhea  according to chart review/unit nurses.   Unit Nurses report excoriation and pink scar tissue around the marialuisa-anal area.     Plan:  Buttocks/marialuisa-anal area- IAD- Triad ointment BID/prn cleansing.   Triad will assist with moisture management, moist wound healing, and autolytic debridement of non-viable tissueTriad will assist with moisture management, moist wound healing, and autolytic debridement of non-viable tissue    Nursing to continue care, pressure prevention measures  Wound care will follow-up prn as needed  Recommendations made to primary team per secure chat  For above plan . Orders placed.     01/25/2022

## 2022-01-25 NOTE — ASSESSMENT & PLAN NOTE
Patient with Hypoxic Respiratory failure which is Acute.  she is not on home oxygen. Supplemental oxygen was provided and noted-  .   Signs/symptoms of respiratory failure include- tachypnea. Contributing diagnoses includes - CHF, COPD and Interstitial lung disease Labs and images were reviewed. Patient Has recent ABG, which has been reviewed. Will treat underlying causes and adjust management of respiratory failure as follows- please see Covid-19    - Resolved. Patient is sat well on room air

## 2022-01-25 NOTE — PLAN OF CARE
Patient awake, alert, and intermittently oriented with some noted confusion. No events of agititation; precedex gtt stopped during shift. Weaned from O2 via NC to room air and saturating appropriately. Transfer orders in place; awaiting bed. VS and assessment per flow sheet, patient progressing towards goals as tolerated, plan of care reviewed with patient and family; all concerns addressed, will continue to monitor.    Problem: Adult Inpatient Plan of Care  Goal: Plan of Care Review  Outcome: Ongoing, Progressing  Flowsheets (Taken 1/25/2022 1655)  Plan of Care Reviewed With:   patient   daughter  Goal: Patient-Specific Goal (Individualized)  Outcome: Ongoing, Progressing  Flowsheets (Taken 1/25/2022 1655)  Anxieties, Fears or Concerns: Concerned about wanting to speak with family  Individualized Care Needs: Reorientation measures and delirium precautions  Patient-Specific Goals (Include Timeframe):   Wean O2 to room air by end of shift   maintain appropriate mentation off of precedex during course of shift  Goal: Absence of Hospital-Acquired Illness or Injury  Outcome: Ongoing, Progressing  Goal: Optimal Comfort and Wellbeing  Outcome: Ongoing, Progressing  Goal: Readiness for Transition of Care  Outcome: Ongoing, Progressing     Problem: Fall Injury Risk  Goal: Absence of Fall and Fall-Related Injury  Outcome: Ongoing, Progressing     Problem: Skin Injury Risk Increased  Goal: Skin Health and Integrity  Outcome: Ongoing, Progressing     CMICU DAILY GOALS     A: Awake    RASS: Goal - RASS Goal: 0-->alert and calm  Actual - RASS (Monzon Agitation-Sedation Scale): 0-->alert and calm   Restraint necessity: No  B: Breathe   SBT: Not intubated   C: Coordinate A & B, analgesics/sedatives   Pain: managed    SAT: Not intubated  D: Delirium   CAM-ICU: Overall CAM-ICU: Positive  E: Early(intubated/ Progressive (non-intubated) Mobility   MOVE Screen: Pass   Activity: Activity Management: Patient unable to perform  activities  FAS: Feeding/Nutrition   Diet order: Diet/Nutrition Received: mechanical/dental soft,    T: Thrombus   DVT prophylaxis: VTE Required Core Measure: Pharmacological prophylaxis initiated/maintained  H: HOB Elevation   Head of Bed (HOB) Positioning: HOB at 30 degrees  U: Ulcer Prophylaxis   GI: yes  G: Glucose control   managed    S: Skin   Bathing/Skin Care: bath, complete,dressed/undressed,incontinence care,linen changed  Device Skin Pressure Protection: absorbent pad utilized/changed,positioning supports utilized,skin-to-device areas padded,pressure points protected  Pressure Reduction Devices: foam padding utilized,positioning supports utilized,pressure-redistributing mattress utilized  Pressure Reduction Techniques: pressure points protected,weight shift assistance provided  Skin Protection: adhesive use limited,incontinence pads utilized,skin-to-device areas padded,transparent dressing maintained,tubing/devices free from skin contact  B: Bowel Function   diarrhea   I: Indwelling Catheters   Hill necessity:      Urethral Catheter 01/23/22 1037 Double-lumen;Temperature probe 16 Fr.-Reason for Continuing Urinary Catheterization: Critically ill in ICU and requiring hourly monitoring of intake/output   CVC necessity: No  D: De-escalation Antibiotics   Abx per MAR    Family/Goals of care/Code Status   Code Status: Full Code    24H Vital Sign Range  Temp:  [97 °F (36.1 °C)-98 °F (36.7 °C)]   Pulse:  []   Resp:  [11-62]   BP: ()/(51-64)   SpO2:  [93 %-100 %]

## 2022-01-25 NOTE — ASSESSMENT & PLAN NOTE
- On heparin gtt  - Patient is not on home med for rate control  - consider amiodarone gtt if patient develop sustained AF with RVR    - Patient is on chronic Eliquis for a fib with a [id number] Score of 7. From chart review, patient recently sustained a SDH from a fall, Eliquis was hold then resumed per neurosurgery receommendation on 12/28. Patient is on heparin gtt on this admission due to her initial presentation was not compatible for PO intake. Decision to keep her on heparin gtt was made, considering her recent brain bleed. Heparin gtt is short acting, able to clear off immediately incase of hemorrhagic events.

## 2022-01-26 LAB
ALBUMIN SERPL BCP-MCNC: 1.9 G/DL (ref 3.5–5.2)
ALP SERPL-CCNC: 90 U/L (ref 55–135)
ALT SERPL W/O P-5'-P-CCNC: 10 U/L (ref 10–44)
ANION GAP SERPL CALC-SCNC: 11 MMOL/L (ref 8–16)
APTT BLDCRRT: 46.6 SEC (ref 21–32)
APTT BLDCRRT: 46.6 SEC (ref 21–32)
AST SERPL-CCNC: 19 U/L (ref 10–40)
BACTERIA BLD CULT: ABNORMAL
BASOPHILS # BLD AUTO: 0.01 K/UL (ref 0–0.2)
BASOPHILS NFR BLD: 0.1 % (ref 0–1.9)
BILIRUB SERPL-MCNC: 1.1 MG/DL (ref 0.1–1)
BUN SERPL-MCNC: 29 MG/DL (ref 8–23)
CALCIUM SERPL-MCNC: 7.5 MG/DL (ref 8.7–10.5)
CHLORIDE SERPL-SCNC: 108 MMOL/L (ref 95–110)
CO2 SERPL-SCNC: 22 MMOL/L (ref 23–29)
CREAT SERPL-MCNC: 1 MG/DL (ref 0.5–1.4)
DIFFERENTIAL METHOD: ABNORMAL
EOSINOPHIL # BLD AUTO: 0 K/UL (ref 0–0.5)
EOSINOPHIL NFR BLD: 0.2 % (ref 0–8)
ERYTHROCYTE [DISTWIDTH] IN BLOOD BY AUTOMATED COUNT: 17.8 % (ref 11.5–14.5)
EST. GFR  (AFRICAN AMERICAN): 59.8 ML/MIN/1.73 M^2
EST. GFR  (NON AFRICAN AMERICAN): 51.9 ML/MIN/1.73 M^2
GLUCOSE SERPL-MCNC: 87 MG/DL (ref 70–110)
HCT VFR BLD AUTO: 25.4 % (ref 37–48.5)
HGB BLD-MCNC: 7.9 G/DL (ref 12–16)
IMM GRANULOCYTES # BLD AUTO: 0.27 K/UL (ref 0–0.04)
IMM GRANULOCYTES NFR BLD AUTO: 2.9 % (ref 0–0.5)
LYMPHOCYTES # BLD AUTO: 1.6 K/UL (ref 1–4.8)
LYMPHOCYTES NFR BLD: 17.6 % (ref 18–48)
MAGNESIUM SERPL-MCNC: 1.8 MG/DL (ref 1.6–2.6)
MAYO MISCELLANEOUS RESULT (REF): NORMAL
MCH RBC QN AUTO: 27.7 PG (ref 27–31)
MCHC RBC AUTO-ENTMCNC: 31.1 G/DL (ref 32–36)
MCV RBC AUTO: 89 FL (ref 82–98)
MONOCYTES # BLD AUTO: 1.6 K/UL (ref 0.3–1)
MONOCYTES NFR BLD: 17.1 % (ref 4–15)
NEUTROPHILS # BLD AUTO: 5.7 K/UL (ref 1.8–7.7)
NEUTROPHILS NFR BLD: 62.1 % (ref 38–73)
NRBC BLD-RTO: 0 /100 WBC
PHOSPHATE SERPL-MCNC: 2.3 MG/DL (ref 2.7–4.5)
PLATELET # BLD AUTO: 232 K/UL (ref 150–450)
PMV BLD AUTO: 11.2 FL (ref 9.2–12.9)
POTASSIUM SERPL-SCNC: 3.3 MMOL/L (ref 3.5–5.1)
PROT SERPL-MCNC: 4.7 G/DL (ref 6–8.4)
RBC # BLD AUTO: 2.85 M/UL (ref 4–5.4)
SODIUM SERPL-SCNC: 141 MMOL/L (ref 136–145)
WBC # BLD AUTO: 9.19 K/UL (ref 3.9–12.7)

## 2022-01-26 PROCEDURE — 84100 ASSAY OF PHOSPHORUS: CPT | Performed by: EMERGENCY MEDICINE

## 2022-01-26 PROCEDURE — 25000003 PHARM REV CODE 250: Performed by: STUDENT IN AN ORGANIZED HEALTH CARE EDUCATION/TRAINING PROGRAM

## 2022-01-26 PROCEDURE — 94640 AIRWAY INHALATION TREATMENT: CPT

## 2022-01-26 PROCEDURE — 85025 COMPLETE CBC W/AUTO DIFF WBC: CPT

## 2022-01-26 PROCEDURE — 94761 N-INVAS EAR/PLS OXIMETRY MLT: CPT

## 2022-01-26 PROCEDURE — 25000003 PHARM REV CODE 250: Performed by: EMERGENCY MEDICINE

## 2022-01-26 PROCEDURE — 20600001 HC STEP DOWN PRIVATE ROOM

## 2022-01-26 PROCEDURE — 97530 THERAPEUTIC ACTIVITIES: CPT

## 2022-01-26 PROCEDURE — 80053 COMPREHEN METABOLIC PANEL: CPT | Performed by: EMERGENCY MEDICINE

## 2022-01-26 PROCEDURE — 25000003 PHARM REV CODE 250: Performed by: HOSPITALIST

## 2022-01-26 PROCEDURE — 97112 NEUROMUSCULAR REEDUCATION: CPT

## 2022-01-26 PROCEDURE — 97110 THERAPEUTIC EXERCISES: CPT

## 2022-01-26 PROCEDURE — 25000242 PHARM REV CODE 250 ALT 637 W/ HCPCS: Performed by: EMERGENCY MEDICINE

## 2022-01-26 PROCEDURE — 99900035 HC TECH TIME PER 15 MIN (STAT)

## 2022-01-26 PROCEDURE — 25000003 PHARM REV CODE 250

## 2022-01-26 PROCEDURE — 99291 PR CRITICAL CARE, E/M 30-74 MINUTES: ICD-10-PCS | Mod: CR,GC,, | Performed by: STUDENT IN AN ORGANIZED HEALTH CARE EDUCATION/TRAINING PROGRAM

## 2022-01-26 PROCEDURE — 63600175 PHARM REV CODE 636 W HCPCS

## 2022-01-26 PROCEDURE — 83735 ASSAY OF MAGNESIUM: CPT | Performed by: EMERGENCY MEDICINE

## 2022-01-26 PROCEDURE — 85730 THROMBOPLASTIN TIME PARTIAL: CPT

## 2022-01-26 PROCEDURE — 27000221 HC OXYGEN, UP TO 24 HOURS

## 2022-01-26 PROCEDURE — 27000207 HC ISOLATION

## 2022-01-26 PROCEDURE — C9399 UNCLASSIFIED DRUGS OR BIOLOG: HCPCS | Performed by: EMERGENCY MEDICINE

## 2022-01-26 PROCEDURE — 63600175 PHARM REV CODE 636 W HCPCS: Performed by: EMERGENCY MEDICINE

## 2022-01-26 PROCEDURE — 63600175 PHARM REV CODE 636 W HCPCS: Performed by: STUDENT IN AN ORGANIZED HEALTH CARE EDUCATION/TRAINING PROGRAM

## 2022-01-26 PROCEDURE — 99291 CRITICAL CARE FIRST HOUR: CPT | Mod: CR,GC,, | Performed by: STUDENT IN AN ORGANIZED HEALTH CARE EDUCATION/TRAINING PROGRAM

## 2022-01-26 RX ORDER — ACETAMINOPHEN 325 MG/1
650 TABLET ORAL EVERY 6 HOURS PRN
Status: DISCONTINUED | OUTPATIENT
Start: 2022-01-26 | End: 2022-02-08 | Stop reason: HOSPADM

## 2022-01-26 RX ORDER — VANCOMYCIN HCL IN 5 % DEXTROSE 1G/250ML
15 PLASTIC BAG, INJECTION (ML) INTRAVENOUS
Status: DISCONTINUED | OUTPATIENT
Start: 2022-01-26 | End: 2022-01-28

## 2022-01-26 RX ADMIN — MELATONIN TAB 3 MG 6 MG: 3 TAB at 10:01

## 2022-01-26 RX ADMIN — POTASSIUM CHLORIDE 10 MEQ: 7.46 INJECTION, SOLUTION INTRAVENOUS at 07:01

## 2022-01-26 RX ADMIN — VANCOMYCIN HYDROCHLORIDE 1000 MG: 1 INJECTION, POWDER, LYOPHILIZED, FOR SOLUTION INTRAVENOUS at 06:01

## 2022-01-26 RX ADMIN — POTASSIUM PHOSPHATE, MONOBASIC AND POTASSIUM PHOSPHATE, DIBASIC 15 MMOL: 224; 236 INJECTION, SOLUTION, CONCENTRATE INTRAVENOUS at 08:01

## 2022-01-26 RX ADMIN — BARICITINIB 2 MG: 2 TABLET, FILM COATED ORAL at 09:01

## 2022-01-26 RX ADMIN — MUPIROCIN: 20 OINTMENT TOPICAL at 09:01

## 2022-01-26 RX ADMIN — CEFTRIAXONE 1 G: 1 INJECTION, SOLUTION INTRAVENOUS at 02:01

## 2022-01-26 RX ADMIN — IPRATROPIUM BROMIDE AND ALBUTEROL SULFATE 3 ML: 2.5; .5 SOLUTION RESPIRATORY (INHALATION) at 02:01

## 2022-01-26 RX ADMIN — MUPIROCIN: 20 OINTMENT TOPICAL at 11:01

## 2022-01-26 RX ADMIN — POTASSIUM CHLORIDE 10 MEQ: 7.46 INJECTION, SOLUTION INTRAVENOUS at 06:01

## 2022-01-26 RX ADMIN — DEXAMETHASONE SODIUM PHOSPHATE 6 MG: 4 INJECTION INTRA-ARTICULAR; INTRALESIONAL; INTRAMUSCULAR; INTRAVENOUS; SOFT TISSUE at 09:01

## 2022-01-26 RX ADMIN — PIPERACILLIN AND TAZOBACTAM 4.5 G: 4; .5 INJECTION, POWDER, LYOPHILIZED, FOR SOLUTION INTRAVENOUS; PARENTERAL at 04:01

## 2022-01-26 RX ADMIN — FUROSEMIDE 20 MG: 10 INJECTION, SOLUTION INTRAVENOUS at 09:01

## 2022-01-26 RX ADMIN — ACETAMINOPHEN 650 MG: 325 TABLET ORAL at 11:01

## 2022-01-26 RX ADMIN — HEPARIN SODIUM 8 UNITS/KG/HR: 1000 INJECTION INTRAVENOUS; SUBCUTANEOUS at 05:01

## 2022-01-26 RX ADMIN — IPRATROPIUM BROMIDE AND ALBUTEROL SULFATE 3 ML: 2.5; .5 SOLUTION RESPIRATORY (INHALATION) at 12:01

## 2022-01-26 RX ADMIN — MUPIROCIN: 20 OINTMENT TOPICAL at 08:01

## 2022-01-26 RX ADMIN — IPRATROPIUM BROMIDE AND ALBUTEROL SULFATE 3 ML: 2.5; .5 SOLUTION RESPIRATORY (INHALATION) at 08:01

## 2022-01-26 RX ADMIN — PANTOPRAZOLE SODIUM 40 MG: 40 TABLET, DELAYED RELEASE ORAL at 09:01

## 2022-01-26 RX ADMIN — REMDESIVIR 100 MG: 100 INJECTION, POWDER, LYOPHILIZED, FOR SOLUTION INTRAVENOUS at 11:01

## 2022-01-26 NOTE — ASSESSMENT & PLAN NOTE
- On heparin gtt  - Patient is not on home med for rate control  - consider amiodarone gtt if patient develop sustained AF with RVR    - Patient is on chronic Eliquis for a fib with a [id number]-[id number] Score of 7. From chart review, patient recently sustained a SDH from a fall, Eliquis was hold then resumed per neurosurgery receommendation on 12/28. Patient is on heparin gtt on this admission due to her initial presentation was not compatible for PO intake. Decision to keep her on heparin gtt was made, considering her recent brain bleed. Heparin gtt is short acting, able to clear off immediately incase of hemorrhagic events.

## 2022-01-26 NOTE — ASSESSMENT & PLAN NOTE
Admitted with acute hypoxemic respiratory failure 2/2 COVID-19. Vaccination status: vaccinated. COVID+ on 1/23/2022. Course complicated by history of COPD, HF, encephalopathy.    Plan:  - Anticoagulation: heparin  - Antibiotics: vanc and zosyn => vanc and rocephin   - Remdesivir x 5 days (1/23), holding plaquenil (QT prolongation and negative effect to remdesivir)   - Baricitinib x 14 days (1/23), holding Cellcept   - Dexamethasone 6mg x 10 days (1/23), holding prednisone   - Self-proning as tolerated  - On 8L NC, stable blood gas on admission, will start NIV as indicated  - Continuous pulse ox  - Wean supplemental O2 as tolerated => on 4L NC since last night => on room air 1/25  - Trend CRP, LDH, ferritin q48hr  - Airborne + contact precautions

## 2022-01-26 NOTE — PLAN OF CARE
Problem: Physical Therapy Goal  Goal: Physical Therapy Goal  Description: Goals to be met by: 22    Patient will increase functional independence with mobility by performin. Supine to sit with MInimal Assistance -not met  2. Sit to stand transfer with Minimal Assistance with RW - not met  3. Bed to chair transfer with Minimal Assistance using Rolling Walker -not met  4. Gait  x 20 feet with Moderate Assistance using Rolling Walker. -not met  5. Ascend/descend 5 stair with right Handrails Moderate Assistance -not met  6. Sitting at edge of bed x10 minutes with Contact Guard Assistance to perform activities and increase endurance - not met    Outcome: Ongoing, Progressing   Goals remain appropriate. 2022

## 2022-01-26 NOTE — PROGRESS NOTES
[facility] ([place] [id number])  Critical Care Medicine  Progress Note    Patient Name: Selma Lux  MRN: [id number]  Admission Date: 1/23/2022  Hospital Length of Stay: 3 days  Code Status: Full Code  Attending Provider: Cristian Beckford MD  Primary Care Provider: Bhargav Hirsch MD   Principal Problem: Acute hypoxemic respiratory failure    Subjective:     HPI:  Selma Lux is a 84 y.o. 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  She has had encephalopathy during her hospital stay and admission to rehab. She tested positive for COVID yesterday.  Paramedics state that she has been having very poor oral intake.     Collateral information from her daughter.  She notes that she has had difficulty breathing for several months.  She states she has cryptogenic pneumonia and received steroids and breathing treatments for this. 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  She noted that she had been on the ventilator and really hated being on the ventilator.  However, she would want to be intubated if her life depended on it.  She would want to try everything possible to avoid intubation.  She noted that she tested positive for COVID yesterday along with her mother.  She states that she is not able to come see her mother due to coronavirus.  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    ED course: Patient with positive COVID test and respiratory distress. This seems to be a recurrent  problem with her history of cryptogenic organizing pneumonia. Prior chest x-rays seems to have had a similar appearance to the x-ray from today.  Diffuse infiltrates.  This resolved on recent chest x-rays from her admission.  Of note this recent admission is under a different registration under the same name in epic.   Her venous blood gas does not show respiratory failure or CO2 retention. She seems to have encephalopathy but this has been an ongoing issue. Her sodium today is normalized. Patient has markedly infected urine and an elevated white count.  ED started broad-spectrum IV antibiotics.    CT Head 1/23: Mixed density extra-axial collection overlies the right cerebral convexity overall reduced in size from prior compatible with evolving subacute subdural hemorrhage.  No significant new hemorrhage. no definite parenchymal hemorrhage or sulcal effacement to suggest large territory recent infarction.    CT chest 1/23: Interval development of patchy consolidation in both lungs with a bibasilar predominance along with a small right pleural effusion.  Findings most consistent with multifocal pneumonia.  Follow-up to resolution advised. Thyroid gland is diffusely enlarged.  Nodule on the right is stable in size resulting in leftward deviation and mass effect upon the airway.      Hospital/ICU Course:  Selma Lux is a 84 y.o. 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  She has had encephalopathy during her hospital stay and admission to rehab. She tested positive for COVID on 1/23.  Paramedics state that she has been having very poor oral intake.  Patient was admitted to ICU for hypotension and respiratory distress, anticipating rapid deterioration 2/2 her medical history. Patient is on 4L NC, SpO2 stable, she had episode of hypotensions but not sustained, no pressor indicated. Mentation improved in the morning. Patient required sedation for procedure. Family visit seemed to calm her down. Patient no longer required Precedex gtt. Qtc prolongation resolved, likely 2/2 to chronic plaquenil. Patient sat well on room air. Patient ready to transfer to lower level of care unit      Interval History/Significant Events: NAEON. Oriented to self and place. Dementia worse at night. Complaint of cough.     Review of Systems   Constitutional: Negative for fever.   HENT: Negative for congestion.    Eyes: Negative for visual disturbance.   Respiratory: Positive for cough. Negative for shortness of breath.    Cardiovascular: Negative for chest pain.   Gastrointestinal: Negative for abdominal distention, abdominal pain, nausea and vomiting.   Musculoskeletal: Positive for arthralgias (chronic).   Skin:        Reports gluteal skin irritation from nursing staff. Consulted wound care   Neurological: Positive for weakness. Negative for headaches.   Psychiatric/Behavioral: Positive for confusion.     Objective:     Vital Signs (Most Recent):  Temp: 97.8 °F (36.6 °C) (01/25/22 1901)  Pulse: 92 (01/26/22 0813)  Resp: (!) 25 (01/26/22 0813)  BP: (!) 102/57 (01/26/22 0501)  SpO2: 96 % (01/26/22 0813) Vital Signs (24h Range):  Temp:  [97.7 °F (36.5 °C)-97.8 °F (36.6 °C)] 97.8 °F (36.6 °C)  Pulse:  [] 92  Resp:  [15-62] 25  SpO2:  [92 %-100 %] 96 %  BP: ()/(45-67) 102/57   Weight: 68.5 kg (151 lb)  Body mass index is 25.92 kg/m².      Intake/Output Summary (Last 24 hours) at 1/26/2022 0842  Last data filed at 1/26/2022 0601  Gross per 24 hour   Intake 944.27 ml   Output 1482 ml   Net -537.73 ml       Physical Exam  Vitals and nursing note reviewed.   Constitutional:        General: She is not in acute distress.  HENT:      Head: Normocephalic and atraumatic.      Nose: Nose normal.      Mouth/Throat:      Mouth: Mucous membranes are moist.      Pharynx: Oropharynx is clear.   Eyes:      Extraocular Movements: Extraocular movements intact.      Pupils: Pupils are equal, round, and reactive to light.   Cardiovascular:      Rate and Rhythm: Normal rate and regular rhythm.   Pulmonary:      Effort: Pulmonary effort is normal.      Breath sounds: Normal breath sounds.   Abdominal:      General: There is no distension.      Tenderness: There is no abdominal tenderness.   Musculoskeletal:         General: Tenderness (chronic pain from RA) present. No swelling.      Cervical back: Normal range of motion.   Skin:     General: Skin is warm.      Capillary Refill: Capillary refill takes less than 2 seconds.   Neurological:      General: No focal deficit present.      Mental Status: She is alert.      Motor: Weakness present.         Vents:     Lines/Drains/Airways     Drain                 Urethral Catheter 01/23/22 1037 Double-lumen;Temperature probe 16 Fr. 2 days          Peripheral Intravenous Line                 Peripheral IV - Single Lumen 01/23/22 0000 18 G Right Antecubital 3 days         Peripheral IV - Single Lumen 01/23/22 1030 20 G Right Shoulder 2 days         Midline Catheter Insertion/Assessment  - Single Lumen 01/24/22 1537 Left brachial vein 18g x 8cm 1 day              Significant Labs:    CBC/Anemia Profile:  Recent Labs   Lab 01/25/22  0458 01/26/22  0426   WBC 10.15 9.19   HGB 8.0* 7.9*   HCT 24.8* 25.4*    232   MCV 86 89   RDW 17.7* 17.8*        Chemistries:  Recent Labs   Lab 01/25/22  0458 01/26/22  0426    141   K 4.5 3.3*    108   CO2 20* 22*   BUN 27* 29*   CREATININE 0.9 1.0   CALCIUM 7.3* 7.5*   ALBUMIN 1.8* 1.9*   PROT 4.8* 4.7*   BILITOT 0.9 1.1*   ALKPHOS 88 90   ALT 10 10   AST 21 19   MG 1.9 1.8   PHOS 4.0 2.3*       All pertinent labs within  the past 24 hours have been reviewed.    Significant Imaging:  I have reviewed all pertinent imaging results/findings within the past 24 hours.      ABG  Recent Labs   Lab 01/23/22  1024   PH 7.435   PO2 21*   PCO2 36.7   HCO3 24.6   BE 0     Assessment/Plan:     Pulmonary  * Acute hypoxemic respiratory failure  Patient with Hypoxic Respiratory failure which is Acute.  she is not on home oxygen. Supplemental oxygen was provided and noted-  .   Signs/symptoms of respiratory failure include- tachypnea. Contributing diagnoses includes - CHF, COPD and Interstitial lung disease Labs and images were reviewed. Patient Has recent ABG, which has been reviewed. Will treat underlying causes and adjust management of respiratory failure as follows- please see Covid-19    - Resolved. Patient is sat well on room air    COPD exacerbation  - wheezing present on physical exam  - schedule duoneb q6    Cardiac/Vascular  Chronic systolic (congestive) heart failure  Patient is identified as having Grade 1 diastolic dysfunction heart failure that is Chronic. CHF is currently controlled. Latest ECHO performed and demonstrates- Results for orders placed during the hospital encounter of 02/27/20    Echo Color Flow Doppler? Yes    Interpretation Summary  · Concentric left ventricular remodeling. Normal left ventricular systolic function. The estimated ejection fraction is 55%.  · Mild left atrial enlargement.  · Grade I (mild) left ventricular diastolic dysfunction consistent with impaired relaxation.  · Normal right ventricular systolic function.  · Mild mitral regurgitation.  · Mild tricuspid regurgitation.  · Normal central venous pressure (3 mmHg).  · The estimated PA systolic pressure is 39 mmHg.    Tachycardia with HR of 110's  . Continue Furosemide and monitor clinical status closely. Monitor on telemetry. Patient is on CHF pathway.  Monitor strict Is&Os and daily weights.  Place on fluid restriction of 1 L. Continue to stress to  patient importance of self efficacy and  on diet for CHF. Last BNP reviewed- and noted below   Recent Labs   Lab 01/23/22  1023   *   .      AF (paroxysmal atrial fibrillation)  - On heparin gtt  - Patient is not on home med for rate control  - consider amiodarone gtt if patient develop sustained AF with RVR    - Patient is on chronic Eliquis for a fib with a [id number] Score of 7. From chart review, patient recently sustained a SDH from a fall, Eliquis was hold then resumed per neurosurgery receommendation on 12/28. Patient is on heparin gtt on this admission due to her initial presentation was not compatible for PO intake. Decision to keep her on heparin gtt was made, considering her recent brain bleed. Heparin gtt is short acting, able to clear off immediately incase of hemorrhagic events.     Long QT interval  - follow up on EKG  - repeat EKG, QTc 416. QT prolongation likely from chronic plaquenil use    Hypertension, essential  - Holding Amlodipine due to hypotension     Renal/  UTI (urinary tract infection)  - ED started vanc and zosyn   - on Vanc and Rocephin     Chronic renal failure syndrome, stage 3 (moderate)  - stable renal function   - strict I and O  - on lasix 40 mg daily => 20 mg IV    Endocrine  Multinodular goiter  - Enlarged thyroid gland resulting in leftward deviation and mass effect upon the airway => anticipate difficult airway access   - Prior consultations with surgery, patient lost following up due to multiple recent medical events     GI  Oropharyngeal dysphagia  - Evaluated by SLP:   Diet recommendations:  Dental Soft, Thin   Aspiration Precautions: 1 bite/sip at a time, Assistance with meals, Avoid talking while eating, Eliminate distractions, Feed only when awake/alert, HOB to 90 degrees, Meds whole 1 at a time, Remain upright 30 minutes post meal and Small bites/sips     - Diet ordered      PUD (peptic ulcer disease)  - On pantoprazole     Other  Immunocompromised  state due to drug therapy  Patient is on chronic prednisone for her , on Plaquenil and Cellcept for RA. Historically prescribe bactrim for PCP prophylaxis.              Pneumonia due to COVID-19 virus  Admitted with acute hypoxemic respiratory failure 2/2 COVID-19. Vaccination status: vaccinated. COVID+ on 1/23/2022. Course complicated by history of COPD, HF, encephalopathy.    Plan:  - Anticoagulation: heparin  - Antibiotics: vanc and zosyn => vanc and rocephin   - Remdesivir x 5 days (1/23), holding plaquenil (QT prolongation and negative effect to remdesivir)   - Baricitinib x 14 days (1/23), holding Cellcept   - Dexamethasone 6mg x 10 days (1/23), holding prednisone   - Self-proning as tolerated  - On 8L NC, stable blood gas on admission, will start NIV as indicated  - Continuous pulse ox  - Wean supplemental O2 as tolerated => on 4L NC since last night => on room air 1/25  - Trend CRP, LDH, ferritin q48hr  - Airborne + contact precautions       Critical Care Daily Checklist:    A: Awake: RASS Goal/Actual Goal: RASS Goal: 0-->alert and calm  Actual: Monzon Agitation Sedation Scale (RASS): Alert and calm   B: Spontaneous Breathing Trial Performed?     C: SAT & SBT Coordinated?  n/a                      D: Delirium: CAM-ICU Overall CAM-ICU: Positive   E: Early Mobility Performed? Yes   F: Feeding Goal:    Status:     Current Diet Order   Procedures    Diet Dysphagia Mechanical Soft (IDDSI Level 5)      AS: Analgesia/Sedation n/a   T: Thromboembolic Prophylaxis heparin   H: HOB > 300 Yes   U: Stress Ulcer Prophylaxis (if needed) PPI   G: Glucose Control ISS   B: Bowel Function Stool Occurrence: 1   I: Indwelling Catheter (Lines & Hill) Necessity IV, Midline, Hill   D: De-escalation of Antimicrobials/Pharmacotherapies Dc zosyn    Plan for the day/ETD Sleep hygiene     Code Status:  Family/Goals of Care: Full Code  updated       Critical secondary to Patient has a condition that poses threat to life and  bodily function: Severe Respiratory Distress      Critical care was time spent personally by me on the following activities: development of treatment plan with patient or surrogate and bedside caregivers, discussions with consultants, evaluation of patient's response to treatment, examination of patient, ordering and performing treatments and interventions, ordering and review of laboratory studies, ordering and review of radiographic studies, pulse oximetry, re-evaluation of patient's condition. This critical care time did not overlap with that of any other provider or involve time for any procedures.     Sheldon Mcginnis MD  Critical Care Medicine  [facility] - Intensive Care ([facility])

## 2022-01-26 NOTE — PT/OT/SLP PROGRESS
Physical Therapy  Co-Treatment with OT    Patient Name:  Selma Lux   MRN:  [id number]    Recommendations:     Discharge Recommendations:  rehabilitation facility   Discharge Equipment Recommendations:  (will determine DME needs closer to discharge)   Barriers to discharge: Decreased caregiver support family will not be able to assist at current functional level.     Assessment:     Selma Lux is a 86 y.o. female admitted with a medical diagnosis of Acute hypoxemic respiratory failure.  She presents with the following impairments/functional limitations:  weakness,impaired endurance,impaired functional mobilty,gait instability,impaired balance,decreased safety awareness,decreased lower extremity function,decreased coordination,impaired cognition pt tolerated treatment poorly with pain limiting functional mobility. Pt will benefit from cont skilled PT 3x/wk to progress physically and will need inpt rehab when medically stable to maximize rehab potential.     Rehab Prognosis: Good; patient would benefit from acute skilled PT services to address these deficits and reach maximum level of function.    Recent Surgery: * No surgery found *      Plan:     During this hospitalization, patient to be seen 3 x/week to address the identified rehab impairments via gait training,therapeutic activities,therapeutic exercises,neuromuscular re-education and progress toward the following goals:    · Plan of Care Expires:  02/22/22    Subjective     Chief Complaint: pt c/o pain in buttocks with treatment.   Patient/Family Comments/goals: to go home.   Pain/Comfort:  · Pain Rating 1: 10/10 (buttocks)  · Pain Addressed 1: Cessation of Activity  · Pain Rating Post-Intervention 1: 10/10 (buttocks)      Objective:     Communicated with nurse prior to session.  Patient found supine with telemetry,pulse ox (continuous),blood pressure cuff,parikh catheter,peripheral IV upon PT entry to room.     General Precautions: Standard,  airborne,contact,droplet,fall   Orthopedic Precautions:    Braces:    Respiratory Status: Room air     Functional Mobility:  · Bed Mobility:   Pt needed verbal cues for hand placement and sequencing for functional mobility.   · Rolling Left:  total assistance and of 2 persons  · Rolling Right: total assistance and of 2 persons  · Supine to Sit: total assistance and of 2 persons  · Sit to Supine: total assistance and of 2 persons  ·   · Balance: pt sat on EOB x 1 min with total assist. pt c/o pain in her buttocks and leaned to R side to return to supine in bed.     Due to pt complex medical condition, the skill of 2 licensed therapists is needed to maximize treatment session and progression towards goals.       AM-PAC 6 CLICK MOBILITY  Turning over in bed (including adjusting bedclothes, sheets and blankets)?: 2  Sitting down on and standing up from a chair with arms (e.g., wheelchair, bedside commode, etc.): 1  Moving from lying on back to sitting on the side of the bed?: 2  Moving to and from a bed to a chair (including a wheelchair)?: 2  Need to walk in hospital room?: 1  Climbing 3-5 steps with a railing?: 1  Basic Mobility Total Score: 9       Therapeutic Activities and Exercises:   pt performed AAROM BLE x 10 reps in supine with rest periods as needed.     Pt received verbal instructions in PT POC and verbally expressed understanding of such.     Patient left supine with all lines intact, call button in reach and RN notified..    GOALS:   Multidisciplinary Problems     Physical Therapy Goals        Problem: Physical Therapy Goal    Goal Priority Disciplines Outcome Goal Variances Interventions   Physical Therapy Goal     PT, PT/OT Ongoing, Progressing     Description: Goals to be met by: 22    Patient will increase functional independence with mobility by performin. Supine to sit with MInimal Assistance -not met  2. Sit to stand transfer with Minimal Assistance with RW - not met  3. Bed to chair  transfer with Minimal Assistance using Rolling Walker -not met  4. Gait  x 20 feet with Moderate Assistance using Rolling Walker. -not met  5. Ascend/descend 5 stair with right Handrails Moderate Assistance -not met  6. Sitting at edge of bed x10 minutes with Contact Guard Assistance to perform activities and increase endurance - not met                     Time Tracking:     PT Received On: 01/26/22  PT Start Time: 0822     PT Stop Time: 0856  PT Total Time (min): 34 min     Billable Minutes: Therapeutic Exercise 12 min and Neuromuscular Re-education 22 min    Treatment Type: Treatment (with OT)  PT/PTA: PT     PTA Visit Number: 0     01/26/2022

## 2022-01-26 NOTE — SUBJECTIVE & OBJECTIVE
Interval History/Significant Events: NAEON. Oriented to self and place. Dementia worse at night. Complaint of cough.     Review of Systems   Constitutional: Negative for fever.   HENT: Negative for congestion.    Eyes: Negative for visual disturbance.   Respiratory: Positive for cough. Negative for shortness of breath.    Cardiovascular: Negative for chest pain.   Gastrointestinal: Negative for abdominal distention, abdominal pain, nausea and vomiting.   Musculoskeletal: Positive for arthralgias (chronic).   Skin:        Reports gluteal skin irritation from nursing staff. Consulted wound care   Neurological: Positive for weakness. Negative for headaches.   Psychiatric/Behavioral: Positive for confusion.     Objective:     Vital Signs (Most Recent):  Temp: 97.8 °F (36.6 °C) (01/25/22 1901)  Pulse: 92 (01/26/22 0813)  Resp: (!) 25 (01/26/22 0813)  BP: (!) 102/57 (01/26/22 0501)  SpO2: 96 % (01/26/22 0813) Vital Signs (24h Range):  Temp:  [97.7 °F (36.5 °C)-97.8 °F (36.6 °C)] 97.8 °F (36.6 °C)  Pulse:  [] 92  Resp:  [15-62] 25  SpO2:  [92 %-100 %] 96 %  BP: ()/(45-67) 102/57   Weight: 68.5 kg (151 lb)  Body mass index is 25.92 kg/m².      Intake/Output Summary (Last 24 hours) at 1/26/2022 0842  Last data filed at 1/26/2022 0601  Gross per 24 hour   Intake 944.27 ml   Output 1482 ml   Net -537.73 ml       Physical Exam  Vitals and nursing note reviewed.   Constitutional:       General: She is not in acute distress.  HENT:      Head: Normocephalic and atraumatic.      Nose: Nose normal.      Mouth/Throat:      Mouth: Mucous membranes are moist.      Pharynx: Oropharynx is clear.   Eyes:      Extraocular Movements: Extraocular movements intact.      Pupils: Pupils are equal, round, and reactive to light.   Cardiovascular:      Rate and Rhythm: Normal rate and regular rhythm.   Pulmonary:      Effort: Pulmonary effort is normal.      Breath sounds: Normal breath sounds.   Abdominal:      General: There is no  distension.      Tenderness: There is no abdominal tenderness.   Musculoskeletal:         General: Tenderness (chronic pain from RA) present. No swelling.      Cervical back: Normal range of motion.   Skin:     General: Skin is warm.      Capillary Refill: Capillary refill takes less than 2 seconds.   Neurological:      General: No focal deficit present.      Mental Status: She is alert.      Motor: Weakness present.         Vents:     Lines/Drains/Airways     Drain                 Urethral Catheter 01/23/22 1037 Double-lumen;Temperature probe 16 Fr. 2 days          Peripheral Intravenous Line                 Peripheral IV - Single Lumen 01/23/22 0000 18 G Right Antecubital 3 days         Peripheral IV - Single Lumen 01/23/22 1030 20 G Right Shoulder 2 days         Midline Catheter Insertion/Assessment  - Single Lumen 01/24/22 1537 Left brachial vein 18g x 8cm 1 day              Significant Labs:    CBC/Anemia Profile:  Recent Labs   Lab 01/25/22  0458 01/26/22  0426   WBC 10.15 9.19   HGB 8.0* 7.9*   HCT 24.8* 25.4*    232   MCV 86 89   RDW 17.7* 17.8*        Chemistries:  Recent Labs   Lab 01/25/22  0458 01/26/22  0426    141   K 4.5 3.3*    108   CO2 20* 22*   BUN 27* 29*   CREATININE 0.9 1.0   CALCIUM 7.3* 7.5*   ALBUMIN 1.8* 1.9*   PROT 4.8* 4.7*   BILITOT 0.9 1.1*   ALKPHOS 88 90   ALT 10 10   AST 21 19   MG 1.9 1.8   PHOS 4.0 2.3*       All pertinent labs within the past 24 hours have been reviewed.    Significant Imaging:  I have reviewed all pertinent imaging results/findings within the past 24 hours.

## 2022-01-26 NOTE — PLAN OF CARE
CMICU DAILY GOALS       A: Awake    RASS: Goal - RASS Goal: 0-->alert and calm  Actual - RASS (Monzon Agitation-Sedation Scale): 0-->alert and calm   Restraint necessity:    B: Breathe   SBT: NA   C: Coordinate A & B, analgesics/sedatives   Pain: managed    SAT: NA  D: Delirium   CAM-ICU: Overall CAM-ICU: Positive  E: Early(intubated/ Progressive (non-intubated) Mobility   MOVE Screen: Pass   Activity: Activity Management: Rolling - L1  FAS: Feeding/Nutrition   Diet order: Diet/Nutrition Received: mechanical/dental soft,    T: Thrombus   DVT prophylaxis: VTE Required Core Measure: Pharmacological prophylaxis initiated/maintained  H: HOB Elevation   Head of Bed (HOB) Positioning: HOB at 30-45 degrees  U: Ulcer Prophylaxis   GI: yes  G: Glucose control   managed    S: Skin   Bathing/Skin Care: bath, complete,dressed/undressed,incontinence care,linen changed  Device Skin Pressure Protection: absorbent pad utilized/changed,adhesive use limited  Pressure Reduction Devices: foam padding utilized,positioning supports utilized,pressure-redistributing mattress utilized  Pressure Reduction Techniques: pressure points protected,weight shift assistance provided  Skin Protection: adhesive use limited,incontinence pads utilized,protective footwear used,transparent dressing maintained,tubing/devices free from skin contact  B: Bowel Function   no issues   I: Indwelling Catheters   Hill necessity:      Urethral Catheter 01/23/22 1037 Double-lumen;Temperature probe 16 Fr.-Reason for Continuing Urinary Catheterization: Critically ill in ICU and requiring hourly monitoring of intake/output   CVC necessity: Yes  D: De-escalation Antibiotics   No    Family/Goals of care/Code Status   Code Status: Full Code    24H Vital Sign Range  Temp:  [97.4 °F (36.3 °C)-97.8 °F (36.6 °C)]   Pulse:  []   Resp:  [11-62]   BP: ()/(45-67)   SpO2:  [92 %-100 %]      Shift Events  Patient awake overnight; became increasingly confused,  "agitated, accusatory, and tearful. Pt. stated "they are trying to poison me," "my teeth are missing" and "take me back to my room" repeatedly. Transfer ordered, awaiting bed assignment.     VS and assessment per flow sheet, patient progressing towards goals as tolerated, plan of care reviewed with Dr. Lux and eldest daughter Rosy, all concerns addressed, will continue to monitor.    "

## 2022-01-26 NOTE — PT/OT/SLP PROGRESS
"Occupational Therapy   Co-Treatment with PT     Name: Selma Lux  MRN: [id number]  Admitting Diagnosis:  Acute hypoxemic respiratory failure       Recommendations:     Discharge Recommendations: rehabilitation facility  Discharge Equipment Recommendations:   (TBD closer to d/c.)  Barriers to discharge:  Inaccessible home environment,Decreased caregiver support    Assessment:     Selma Lux is a 86 y.o. female with a medical diagnosis of Acute hypoxemic respiratory failure.  She presents with performance deficits affecting function are weakness,impaired endurance,impaired self care skills,impaired functional mobilty,gait instability,impaired balance,decreased upper extremity function,decreased lower extremity function,impaired cardiopulmonary response to activity,pain,decreased coordination. Pt would benefit from continued skilled acute OT services in order to maximize independence and safety with ADLs and functional mobility to ensure safe return to PLOF in the least restrictive environment. OT recommending IPR once pt is medically appropriate for d/c.     Rehab Prognosis:  Good; patient would benefit from acute skilled OT services to address these deficits and reach maximum level of function.       Plan:     Patient to be seen 3 x/week to address the above listed problems via self-care/home management,therapeutic activities,therapeutic exercises  · Plan of Care Expires: 02/22/22  · Plan of Care Reviewed with: patient    Subjective     Pain/Comfort:  · Pain Rating 1: 10/10 (buttock)  · Pain Addressed 1: Distraction  · Pain Rating Post-Intervention 1: 10/10    Objective:     Communicated with: RN prior to session.  Patient found HOB elevated with telemetry,blood pressure cuff,pulse ox (continuous),parikh catheter,peripheral IV upon OT entry to room. Pt agreeable to therapy session.   Pt stated, " Quit pulling it!"     General Precautions: Standard, airborne,contact,droplet,fall   Orthopedic " Precautions:N/A   Braces: N/A  Respiratory Status: Room air     Occupational Performance:     Bed Mobility:    · Patient completed x 3 reps Rolling/Turning to Left with  total assistance and 2 persons  · Patient completed x 3 reps Rolling/Turning to Right with total assistance and 2 persons  · Patient completed Scooting/Bridging with total assistance and 2 persons  · Patient completed Supine to Sit with total assistance and 2 persons  · Patient completed Sit to Supine with total assistance and 2 persons     Functional Mobility/Transfers:  · Not performed due to increased pain sitting, total A for sitting balance and decreased safety awareness.     Activities of Daily Living:  · Lower Body Dressing: total assistance donning B  socks   · Toileting: total assistance for hygiene via B rolling with clean linen placement due to pt being found soiled      Therapeutic Exercise:  · Pt completed 1 sets of 10 reps of B UE AAROM exercise program sitting supported in bed in order to work towards increasing UB strength/endurance and to maximize safety and (I) with mobility and self-care skills.  Pt completed the following exercises with initial demonstration from therapist: shld flexion/extension, elbow flexion/extension, shoulder abd/adduction.     [facility] 6 Click ADL: 6    Treatment & Education:   Pt educated on role of OT, POC, and goals for therapy.     POC was dicussed with patient/caregiver, who was included in its development and is in agreement with the identified goals and treatment plan.    Patient and family aware of patient's deficits and therapy progression.    Time provided for therapeutic counseling and discussion of health disposition.    Educated on importance of EOB/OOB mobility, maintaining routine, sitting up in chair, and maximizing independence with ADLs during admission    Pt completed ADLs and functional mobility for treatment session as noted above    Pt/caregiver verbalized understanding and  expressed no further concerns/questions.   Updated communication board     Patient left HOB elevated with all lines intact, call button in reach and RN  notifiedEducation:      GOALS:   Multidisciplinary Problems     Occupational Therapy Goals        Problem: Occupational Therapy Goal    Goal Priority Disciplines Outcome Interventions   Occupational Therapy Goal     OT, PT/OT Ongoing, Progressing    Description: Goals to be met by: 2/7/2022    Patient will increase functional independence with ADLs by performing:    UE Dressing with Minimal Assistance.  Grooming while EOB with Moderate Assistance.  Toileting from bedside commode with Moderate Assistance for hygiene and clothing management.   Sitting at edge of bed x10 minutes with Moderate Assistance.  Toilet transfer to bedside commode with Moderate Assistance.                     Time Tracking:     OT Date of Treatment: 01/26/22  OT Start Time: 0822  OT Stop Time: 0856  OT Total Time (min): 34 min    Billable Minutes:Therapeutic Activity 15  Therapeutic Exercise 15    OT/GENE: OT          1/26/2022

## 2022-01-26 NOTE — PLAN OF CARE
CMICU DAILY GOALS       A: Awake    RASS: Goal - RASS Goal: 0-->alert and calm  Actual - RASS ([place] Agitation-Sedation Scale): 0-->alert and calm   Restraint necessity:    B: Breathe   SBT: Not intubated   C: Coordinate A & B, analgesics/sedatives   Pain: managed    SAT: Not intubated  D: Delirium   CAM-ICU: Overall CAM-ICU: Positive  E: Early(intubated/ Progressive (non-intubated) Mobility   MOVE Screen: Pass   Activity: Activity Management: Rolling - L1  FAS: Feeding/Nutrition   Diet order: Diet/Nutrition Received: mechanical/dental soft,    T: Thrombus   DVT prophylaxis: VTE Required Core Measure: Pharmacological prophylaxis initiated/maintained  H: HOB Elevation   Head of Bed (HOB) Positioning: HOB at 30-45 degrees  U: Ulcer Prophylaxis   GI: yes  G: Glucose control   managed    S: Skin   Bathing/Skin Care: back care,dressed/undressed,incontinence care,linen changed  Device Skin Pressure Protection: absorbent pad utilized/changed,adhesive use limited,pressure points protected,skin-to-skin areas padded  Pressure Reduction Devices: foam padding utilized,heel offloading device utilized,specialty bed utilized  Pressure Reduction Techniques: weight shift assistance provided  Skin Protection: adhesive use limited,incontinence pads utilized,skin-to-skin areas padded,transparent dressing maintained  B: Bowel Function   diarrhea   I: Indwelling Catheters   Hill necessity:      Urethral Catheter 01/23/22 1037 Double-lumen;Temperature probe 16 Fr.-Reason for Continuing Urinary Catheterization: Critically ill in ICU and requiring hourly monitoring of intake/output   CVC necessity: No  D: De-escalation Antibiotics   Yes    Family/Goals of care/Code Status   Code Status: Full Code    24H Vital Sign Range  Temp:  [97.8 °F (36.6 °C)-98.7 °F (37.1 °C)]   Pulse:  []   Resp:  [15-48]   BP: ()/(45-68)   SpO2:  [92 %-100 %]      Shift Events   No acute events throughout shift. Step down ordered placed, pt waiting on  bed.     VS and assessment per flow sheet, patient progressing towards goals as tolerated, plan of care reviewed with  Selma Lux and family , all concerns addressed, will continue to monitor.    Kelsey Saucedo

## 2022-01-26 NOTE — PLAN OF CARE
01/26/22 1608   Post-Acute Status   Post-Acute Authorization Placement   Post-Acute Placement Status Pending medical clearance/testing     [facility] [phone]- Yes, willing to accept patient and  would be able to accept patient after she has had a 10 day waiting period after her Covid positve test yesterday.  SW will continue to follow patient.      Adelaide Dunham LMSW  PRN - [facility]  [id number]

## 2022-01-26 NOTE — PLAN OF CARE
CMICU DAILY GOALS       A: Awake    RASS: Goal - RASS Goal: 0-->alert and calm  Actual - RASS ([place] Agitation-Sedation Scale): 0-->alert and calm   Restraint necessity:    B: Breathe   SBT: Not attempted   C: Coordinate A & B, analgesics/sedatives   Pain: managed    SAT: NA  D: Delirium   CAM-ICU: Overall CAM-ICU: Positive  E: Early(intubated/ Progressive (non-intubated) Mobility   MOVE Screen: Fail   Activity: Activity Management: Rolling - L1  FAS: Feeding/Nutrition   Diet order: Diet/Nutrition Received: mechanical/dental soft,    T: Thrombus   DVT prophylaxis: VTE Required Core Measure: Pharmacological prophylaxis initiated/maintained  H: HOB Elevation   Head of Bed (HOB) Positioning: HOB at 30-45 degrees  U: Ulcer Prophylaxis   GI: yes  G: Glucose control   managed    S: Skin   Bathing/Skin Care: bath, complete,dressed/undressed,incontinence care,linen changed  Device Skin Pressure Protection: absorbent pad utilized/changed,adhesive use limited  Pressure Reduction Devices: foam padding utilized,positioning supports utilized,pressure-redistributing mattress utilized  Pressure Reduction Techniques: pressure points protected,weight shift assistance provided  Skin Protection: adhesive use limited,incontinence pads utilized,protective footwear used,transparent dressing maintained,tubing/devices free from skin contact  B: Bowel Function   no issues   I: Indwelling Catheters   Hill necessity:      Urethral Catheter 01/23/22 1037 Double-lumen;Temperature probe 16 Fr.-Reason for Continuing Urinary Catheterization: Critically ill in ICU and requiring hourly monitoring of intake/output   CVC necessity: Yes  D: De-escalation Antibiotics   No    Family/Goals of care/Code Status   Code Status: Full Code    24H Vital Sign Range  Temp:  [97.4 °F (36.3 °C)-97.8 °F (36.6 °C)]   Pulse:  []   Resp:  [11-62]   BP: ()/(45-67)   SpO2:  [92 %-100 %]      Shift Events  Patient slept through the night. Not alert or  oriented. No events of agitation. Managed precedex gtts. Oxygenation requirement of 2L nasal cannula.    VS and assessment per flow sheet, patient progressing towards goals as tolerated, plan of care reviewed with Dr. Lux, all concerns addressed, will continue to monitor.

## 2022-01-26 NOTE — ASSESSMENT & PLAN NOTE
Patient is on chronic prednisone for her , on Plaquenil and Cellcept for RA. Historically prescribe bactrim for PCP prophylaxis.

## 2022-01-26 NOTE — PROGRESS NOTES
Pharmacokinetic Assessment Follow Up: IV Vancomycin    Vancomycin Regimen Assessment & Plan:  - Vancomycin trough level (24-hour level) resulted at 18.6 mcg/mL, which is considered therapeutic (goal: 15-20 mcg/mL)  - Stable serum creatinine  - Continue vancomycin 1000 mg IV every 24 hours  - Next vancomycin level scheduled on 1/27 at 1700 or sooner if change in renal function      Drug levels (last 3 results):  Recent Labs   Lab Result Units 01/25/22  1715   Vancomycin-Trough ug/mL 18.6       Pharmacy will continue to follow and monitor vancomycin.    Please contact pharmacy at extension [phone] for questions regarding this assessment.    Thank you for the consult,   Rosa Aldana       Patient brief summary:  Selma Lux is a 86 y.o. female initiated on antimicrobial therapy with IV Vancomycin for treatment of bacteremia      Drug Allergies:   Review of patient's allergies indicates:  No Known Allergies    Actual Body Weight:   68.5 kg    Renal Function:   Estimated Creatinine Clearance: 44.2 mL/min (based on SCr of 0.9 mg/dL).     Dialysis Method (if applicable):  N/A    CBC (last 72 hours):  Recent Labs   Lab Result Units 01/23/22  1023 01/23/22  1752 01/24/22  0050 01/24/22  0503 01/25/22  0458   WBC K/uL 13.48* 14.49* 9.90 10.11 10.15   Hemoglobin g/dL 7.5* 7.2* 6.2* 6.9* 8.0*   Hematocrit % 25.2* 24.3* 20.7* 23.5* 24.8*   Platelets K/uL 190 189 167 205 202   Gran % % 68.0 69.3 76.9* 73.7* 62.6   Lymph % % 7.0* 5.7* 6.2* 8.4* 18.0   Mono % % 20.8* 22.6* 14.8 16.2* 16.1*   Eosinophil % % 2.4 1.0 0.2 0.1 1.1   Basophil % % 0.1 0.1 0.1 0.1 0.2   Differential Method  Automated Automated Automated Automated Automated       Metabolic Panel (last 72 hours):  Recent Labs   Lab Result Units 01/23/22  1023 01/23/22  1053 01/24/22  0503 01/25/22  0458   Sodium mmol/L 140  --  138 140   Potassium mmol/L 4.6  --  5.2* 4.5   Chloride mmol/L 107  --  107 110   CO2 mmol/L 23  --  20* 20*   Glucose mg/dL 60*  --  95 88    Glucose, UA   --  Negative  --   --    BUN mg/dL 14  --  19 27*   Creatinine mg/dL 0.9  --  1.0 0.9   Albumin g/dL 2.2*  --  2.0* 1.8*   Total Bilirubin mg/dL 1.4*  --  0.9 0.9   Alkaline Phosphatase U/L 105  --  96 88   AST U/L 17  --  15 21   ALT U/L 13  --  11 10   Magnesium mg/dL  --   --  1.8 1.9   Phosphorus mg/dL  --   --  4.5 4.0       Vancomycin Administrations:  vancomycin given in the last 96 hours                   vancomycin in dextrose 5 % 1 gram/250 mL IVPB 1,000 mg (mg) 1,000 mg New Bag 01/25/22 1812     1,000 mg New Bag 01/24/22 1728    vancomycin 2 g in dextrose 5 % 500 mL IVPB (mg) 2,000 mg New Bag 01/23/22 1147                Microbiologic Results:  Microbiology Results (last 7 days)     Procedure Component Value Units Date/Time    Blood culture [[id number]] Collected: 01/24/22 1616    Order Status: Completed Specimen: Blood from Peripheral, Antecubital, Left Updated: 01/25/22 1812     Blood Culture, Routine No Growth to date      No Growth to date    Blood culture #1 [[id number]]  (Abnormal) Collected: 01/23/22 1023    Order Status: Completed Specimen: Blood from Peripheral, Antecubital, Right Updated: 01/25/22 1322     Blood Culture, Routine Gram stain rigoberto bottle: Gram positive cocci in chains resembling Strep       Results called to and read back by: Eliana Harris RN 01/24/2022        02:02      ENTEROCOCCUS FAECALIS  Susceptibility pending      Narrative:      Blood Culture #1    Blood culture #2 [[id number]] Collected: 01/23/22 1024    Order Status: Completed Specimen: Blood from Peripheral, Upper Arm, Right Updated: 01/25/22 1212     Blood Culture, Routine No Growth to date      No Growth to date      No Growth to date    Narrative:      Blood Culture #2    Urine culture [[id number]]  (Abnormal)  (Susceptibility) Collected: 01/23/22 1053    Order Status: Completed Specimen: Urine Updated: 01/25/22 1053     Urine Culture, Routine ESCHERICHIA COLI  >100,000 cfu/ml      Narrative:       Specimen Source->Urine    Blood culture [[id number]]     Order Status: Sent Specimen: Blood

## 2022-01-27 LAB
APTT BLDCRRT: 35.7 SEC (ref 21–32)
ASCENDING AORTA: 3.25 CM
BSA FOR ECHO PROCEDURE: 1.76 M2
CV ECHO LV RWT: 0.43 CM
DOP CALC LVOT AREA: 3.1 CM2
DOP CALC LVOT DIAMETER: 2 CM
ECHO LV POSTERIOR WALL: 0.96 CM (ref 0.6–1.1)
EJECTION FRACTION: 55 %
FRACTIONAL SHORTENING: 32 % (ref 28–44)
INTERVENTRICULAR SEPTUM: 0.89 CM (ref 0.6–1.1)
LA MAJOR: 4.9 CM
LA MINOR: 4.32 CM
LA WIDTH: 3.97 CM
LEFT ATRIUM SIZE: 3.82 CM
LEFT ATRIUM VOLUME INDEX MOD: 30.7 ML/M2
LEFT ATRIUM VOLUME INDEX: 34 ML/M2
LEFT ATRIUM VOLUME MOD: 53.34 CM3
LEFT ATRIUM VOLUME: 59.19 CM3
LEFT INTERNAL DIMENSION IN SYSTOLE: 3.05 CM (ref 2.1–4)
LEFT VENTRICLE DIASTOLIC VOLUME INDEX: 51.91 ML/M2
LEFT VENTRICLE DIASTOLIC VOLUME: 90.32 ML
LEFT VENTRICLE MASS INDEX: 78 G/M2
LEFT VENTRICLE SYSTOLIC VOLUME INDEX: 20.9 ML/M2
LEFT VENTRICLE SYSTOLIC VOLUME: 36.41 ML
LEFT VENTRICULAR INTERNAL DIMENSION IN DIASTOLE: 4.46 CM (ref 3.5–6)
LEFT VENTRICULAR MASS: 135.82 G
MV PEAK E VEL: 1.28 M/S
PISA TR MAX VEL: 2.27 M/S
RV TISSUE DOPPLER FREE WALL SYSTOLIC VELOCITY 1 (APICAL 4 CHAMBER VIEW): 16.1 CM/S
SINUS: 2.9 CM
STJ: 2.31 CM
TR MAX PG: 21 MMHG
VANCOMYCIN TROUGH SERPL-MCNC: 17.7 UG/ML (ref 10–22)

## 2022-01-27 PROCEDURE — 85730 THROMBOPLASTIN TIME PARTIAL: CPT | Performed by: STUDENT IN AN ORGANIZED HEALTH CARE EDUCATION/TRAINING PROGRAM

## 2022-01-27 PROCEDURE — 20600001 HC STEP DOWN PRIVATE ROOM

## 2022-01-27 PROCEDURE — 27000207 HC ISOLATION

## 2022-01-27 PROCEDURE — 94761 N-INVAS EAR/PLS OXIMETRY MLT: CPT

## 2022-01-27 PROCEDURE — 36415 COLL VENOUS BLD VENIPUNCTURE: CPT | Performed by: STUDENT IN AN ORGANIZED HEALTH CARE EDUCATION/TRAINING PROGRAM

## 2022-01-27 PROCEDURE — 25000003 PHARM REV CODE 250: Performed by: STUDENT IN AN ORGANIZED HEALTH CARE EDUCATION/TRAINING PROGRAM

## 2022-01-27 PROCEDURE — C9399 UNCLASSIFIED DRUGS OR BIOLOG: HCPCS | Performed by: EMERGENCY MEDICINE

## 2022-01-27 PROCEDURE — 63600175 PHARM REV CODE 636 W HCPCS: Performed by: STUDENT IN AN ORGANIZED HEALTH CARE EDUCATION/TRAINING PROGRAM

## 2022-01-27 PROCEDURE — 99233 SBSQ HOSP IP/OBS HIGH 50: CPT | Mod: CR,,, | Performed by: STUDENT IN AN ORGANIZED HEALTH CARE EDUCATION/TRAINING PROGRAM

## 2022-01-27 PROCEDURE — 25000242 PHARM REV CODE 250 ALT 637 W/ HCPCS: Performed by: INTERNAL MEDICINE

## 2022-01-27 PROCEDURE — 80202 ASSAY OF VANCOMYCIN: CPT | Performed by: STUDENT IN AN ORGANIZED HEALTH CARE EDUCATION/TRAINING PROGRAM

## 2022-01-27 PROCEDURE — 25000003 PHARM REV CODE 250: Performed by: EMERGENCY MEDICINE

## 2022-01-27 PROCEDURE — 25000003 PHARM REV CODE 250

## 2022-01-27 PROCEDURE — 63600175 PHARM REV CODE 636 W HCPCS: Performed by: EMERGENCY MEDICINE

## 2022-01-27 PROCEDURE — 99233 PR SUBSEQUENT HOSPITAL CARE,LEVL III: ICD-10-PCS | Mod: CR,,, | Performed by: STUDENT IN AN ORGANIZED HEALTH CARE EDUCATION/TRAINING PROGRAM

## 2022-01-27 PROCEDURE — 94640 AIRWAY INHALATION TREATMENT: CPT

## 2022-01-27 PROCEDURE — 63600175 PHARM REV CODE 636 W HCPCS

## 2022-01-27 PROCEDURE — 92507 TX SP LANG VOICE COMM INDIV: CPT

## 2022-01-27 RX ADMIN — ALBUTEROL SULFATE 2 PUFF: 108 INHALANT RESPIRATORY (INHALATION) at 07:01

## 2022-01-27 RX ADMIN — DEXAMETHASONE SODIUM PHOSPHATE 6 MG: 4 INJECTION INTRA-ARTICULAR; INTRALESIONAL; INTRAMUSCULAR; INTRAVENOUS; SOFT TISSUE at 08:01

## 2022-01-27 RX ADMIN — FUROSEMIDE 20 MG: 10 INJECTION, SOLUTION INTRAVENOUS at 08:01

## 2022-01-27 RX ADMIN — MUPIROCIN: 20 OINTMENT TOPICAL at 08:01

## 2022-01-27 RX ADMIN — MELATONIN TAB 3 MG 6 MG: 3 TAB at 08:01

## 2022-01-27 RX ADMIN — VANCOMYCIN HYDROCHLORIDE 1000 MG: 1 INJECTION, POWDER, LYOPHILIZED, FOR SOLUTION INTRAVENOUS at 06:01

## 2022-01-27 RX ADMIN — REMDESIVIR 100 MG: 100 INJECTION, POWDER, LYOPHILIZED, FOR SOLUTION INTRAVENOUS at 08:01

## 2022-01-27 RX ADMIN — ALBUTEROL SULFATE 2 PUFF: 108 INHALANT RESPIRATORY (INHALATION) at 12:01

## 2022-01-27 NOTE — PROGRESS NOTES
Pharmacokinetic Assessment Follow Up: IV Vancomycin    Vancomycin serum concentration assessment(s):    The trough level was drawn correctly and can be used to guide therapy at this time. The measurement is within the desired definitive target range of 15 to 20 mcg/mL.    Vancomycin Regimen Plan:    Continue regimen of Vancomycin 1000 mg IV every 24 hours with next serum trough concentration measured in ~5-7 days or sooner if kidney function changes significantly      Drug levels (last 3 results):  Recent Labs   Lab Result Units 01/25/22  1715 01/27/22  1638   Vancomycin-Trough ug/mL 18.6 17.7       Pharmacy will continue to follow and monitor vancomycin.    Please contact pharmacy at extension [phone] for questions regarding this assessment.    Thank you for the consult,   Penny Nikky       Patient brief summary:  Selma Lux is a 86 y.o. female initiated on antimicrobial therapy with IV Vancomycin for treatment of bacteremia    The patient's current regimen is 1000mg IV every 24 hours    Drug Allergies:   Review of patient's allergies indicates:  No Known Allergies    Actual Body Weight:   68.5 kg    Renal Function:   Estimated Creatinine Clearance: 39.8 mL/min (based on SCr of 1 mg/dL).,     Dialysis Method (if applicable):  N/A    CBC (last 72 hours):  Recent Labs   Lab Result Units 01/25/22  0458 01/26/22  0426   WBC K/uL 10.15 9.19   Hemoglobin g/dL 8.0* 7.9*   Hematocrit % 24.8* 25.4*   Platelets K/uL 202 232   Gran % % 62.6 62.1   Lymph % % 18.0 17.6*   Mono % % 16.1* 17.1*   Eosinophil % % 1.1 0.2   Basophil % % 0.2 0.1   Differential Method  Automated Automated       Metabolic Panel (last 72 hours):  Recent Labs   Lab Result Units 01/25/22  0458 01/26/22  0426   Sodium mmol/L 140 141   Potassium mmol/L 4.5 3.3*   Chloride mmol/L 110 108   CO2 mmol/L 20* 22*   Glucose mg/dL 88 87   BUN mg/dL 27* 29*   Creatinine mg/dL 0.9 1.0   Albumin g/dL 1.8* 1.9*   Total Bilirubin mg/dL 0.9 1.1*   Alkaline  Phosphatase U/L 88 90   AST U/L 21 19   ALT U/L 10 10   Magnesium mg/dL 1.9 1.8   Phosphorus mg/dL 4.0 2.3*       Vancomycin Administrations:  vancomycin given in the last 96 hours                   vancomycin in dextrose 5 % 1 gram/250 mL IVPB 1,000 mg (mg) 1,000 mg New Bag 01/26/22 1813    vancomycin in dextrose 5 % 1 gram/250 mL IVPB 1,000 mg (mg) 1,000 mg New Bag 01/25/22 1812     1,000 mg New Bag 01/24/22 1728                Microbiologic Results:  Microbiology Results (last 7 days)     Procedure Component Value Units Date/Time    Blood culture [[id number]]  (Abnormal) Collected: 01/24/22 1616    Order Status: Completed Specimen: Blood from Peripheral, Antecubital, Left Updated: 01/27/22 1327     Blood Culture, Routine Gram stain aer bottle: Gram positive cocci in clusters resembling Staph       Results called to and read back by: Kelsey Saucedo RN 01/26/2022         11:50      COAGULASE-NEGATIVE STAPHYLOCOCCUS SPECIES  Organism is a probable contaminant      Blood culture #2 [[id number]] Collected: 01/23/22 1024    Order Status: Completed Specimen: Blood from Peripheral, Upper Arm, Right Updated: 01/27/22 1212     Blood Culture, Routine No Growth to date      No Growth to date      No Growth to date      No Growth to date      No Growth to date    Narrative:      Blood Culture #2    Blood culture [[id number]]     Order Status: No result Specimen: Blood     Blood culture [[id number]]     Order Status: No result Specimen: Blood     Blood culture #1 [[id number]]  (Abnormal)  (Susceptibility) Collected: 01/23/22 1023    Order Status: Completed Specimen: Blood from Peripheral, Antecubital, Right Updated: 01/26/22 1016     Blood Culture, Routine Gram stain rigoberto bottle: Gram positive cocci in chains resembling Strep       Results called to and read back by: Eliana Harris RN 01/24/2022        02:02      ENTEROCOCCUS FAECALIS    Narrative:      Blood Culture #1    Urine culture [[id number]]  (Abnormal)  (Susceptibility)  Collected: 01/23/22 1053    Order Status: Completed Specimen: Urine Updated: 01/25/22 1053     Urine Culture, Routine ESCHERICHIA COLI  >100,000 cfu/ml      Narrative:      Specimen Source->Urine    Blood culture [[id number]]     Order Status: Canceled Specimen: Blood

## 2022-01-27 NOTE — PLAN OF CARE
Problem: Infection  Goal: Absence of Infection Signs and Symptoms  Outcome: Ongoing, Progressing  Intervention: Prevent or Manage Infection  Flowsheets (Taken 1/27/2022 1749)  Isolation Precautions: precautions maintained     Problem: Adult Inpatient Plan of Care  Goal: Plan of Care Review  Outcome: Ongoing, Progressing  Flowsheets (Taken 1/27/2022 1749)  Plan of Care Reviewed With: patient     Problem: Fall Injury Risk  Goal: Absence of Fall and Fall-Related Injury  Outcome: Ongoing, Progressing  Intervention: Promote Injury-Free Environment  Flowsheets (Taken 1/27/2022 1749)  Safety Promotion/Fall Prevention:   side rails raised x 2   bed alarm set      Safety maintained. VSS. Q2 turns. No complaints of pain.

## 2022-01-27 NOTE — NURSING
Received pt to room 8084 from ICU, heparin infusing at 8units/hr, K+ rider infusing, initiated monitors at bedside, orientation to unit, call light and bed controls, plan of care discussed with pt, c/o headache, provider notified, orders received for tylenol, denies further need, monitor.

## 2022-01-27 NOTE — HOSPITAL COURSE
Selma Lux is a 84 y.o. 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  She has had encephalopathy during her hospital stay and admission to rehab. She tested positive for COVID on 1/23.  Paramedics state that she has been having very poor oral intake. Patient was admitted to ICU for hypotension and respiratory distress, anticipating rapid deterioration 2/2 her medical history. Patient is on 4L NC, SpO2 stable, she had episode of hypotensions but not sustained, no pressor indicated. Mentation improved in the morning. Patient required sedation for procedure. Family visit seemed to calm her down. Patient no longer required Precedex gtt. Qtc prolongation resolved, likely 2/2 to chronic plaquenil. Patient sat well on room air. Patient ready to transfer to lower level of care unit. ID consulted. Maricruz and rocephin Dc'd 01/28. Transitioned to amp- completing 14d course. Found to have thrush w/up-trending WBC. Started on fluconazole 01/30. Improvement in leukocytosis.  T bili noted to be rising; likely due to fluconazole.  Due to improvement of thrush on physical exam, fluconazole stopped after 7 day course of treatment versus 14. T bili with subsequent improvement.  Deemed stable for discharge to rehab 01/31.  Issues due to COVID positive status and accepting facilities.  Patient discharged to [facility] on 02/08.  Discharge plan discussed with daughter who is in agreement.

## 2022-01-27 NOTE — NURSING TRANSFER
Nursing Transfer Note      1/26/2022     Reason patient is being transferred: Stepped down     Transfer To: 8084 From: 48540    Transfer via bed    Transfer with cardiac monitoring    Transported by This RN & Roberto, Poncho    Medicines sent: Mupuricin    Any special needs or follow-up needed: none    Chart send with patient: Yes    Notified: daughter Rosy Rosado    Patient reassessed at: 01/26/2022, 2051     Upon arrival to floor: cardiac monitor applied, patient oriented to room, call bell in reach and bed in lowest position, bed alarm set personal belongings in closet.

## 2022-01-27 NOTE — PT/OT/SLP PROGRESS
"Speech Language Pathology Treatment    Patient Name:  Selma Lux   MRN:  [id number]  Admitting Diagnosis: Acute hypoxemic respiratory failure    Recommendations:                 General Recommendations:  Dysphagia therapy and Cognitive-linguistic therapy  Diet recommendations:  Dental Soft, Liquid Diet Level: Thin   Aspiration Precautions: Standard aspiration precautions   General Precautions: Standard, fall,droplet,contact  Communication strategies:  none    Subjective     Patient asleep, roused given min A. No family members present for session. Confused.     "Can you explain the full treatment program to me"    Respiratory Status: Room air    Objective:     Has the patient been evaluated by SLP for swallowing?   Yes  Keep patient NPO? No   Current Respiratory Status:        Patient seen for ongoing cognitive linguistic therapies.  Patient provided items in abstract categories with 70% acc, improved with cues and redirects to task at hand.  Patient oriented to name, date of birth, place and situation.  General compare and contrast activities completed with 80% acc given consistent cues and redirects to task stimulus items.  Poor sustained attention throughout all therapeutic tasks appreciated. Skilled education was provided to patient  re: diet recs, standard aspiration precautions of which to follow, and ongoing ST plan of care.    Assessment:     Selma Lux is a 86 y.o. female with an SLP diagnosis of Dysphagia and Cognitive-Linguistic Impairment.      Goals:   Multidisciplinary Problems     SLP Goals        Problem: SLP Goal    Goal Priority Disciplines Outcome   SLP Goal     SLP Ongoing, Progressing   Description: Speech Language Pathology Goals  Goals expected to be met by 1/31:  1. Patient will tolerate a dysphagia soft diet and thin liquids with no overt signs of airway compromise.   2. Patient will complete functional memory tasks with 70% acc given  min A.   3. Patient will orient x4 " indep.   4. Patient will complete general problem solving tasks with 60% acc given  min A.   5. Patient will participate in further assessments as approp.                            Plan:     · Patient to be seen:  4 x/week   · Plan of Care expires:     · Plan of Care reviewed with:  patient   · SLP Follow-Up:  Yes       Discharge recommendations:  rehabilitation facility   Barriers to Discharge:  None    Time Tracking:     SLP Treatment Date:   01/27/22  Speech Start Time:  0952  Speech Stop Time:  1003     Speech Total Time (min):  11 min    Billable Minutes: Speech Therapy Individual 11    01/27/2022

## 2022-01-27 NOTE — PLAN OF CARE
Problem: Infection  Goal: Absence of Infection Signs and Symptoms  Outcome: Ongoing, Progressing     Problem: Adult Inpatient Plan of Care  Goal: Plan of Care Review  Outcome: Ongoing, Progressing  Goal: Patient-Specific Goal (Individualized)  Outcome: Ongoing, Progressing  Goal: Absence of Hospital-Acquired Illness or Injury  Outcome: Ongoing, Progressing  Goal: Optimal Comfort and Wellbeing  Outcome: Ongoing, Progressing     Problem: Impaired Wound Healing  Goal: Optimal Wound Healing  Outcome: Ongoing, Progressing     Problem: Fall Injury Risk  Goal: Absence of Fall and Fall-Related Injury  Outcome: Ongoing, Progressing     Problem: Skin Injury Risk Increased  Goal: Skin Health and Integrity  Outcome: Ongoing, Progressing

## 2022-01-27 NOTE — PLAN OF CARE
EBONIE assigned to pt's care team.  EBONIE follow up on referrals for inpt rehab to [facility] and [facility].     EBONIE telephoned Corey Jaime [phone] and left a message for Kaitlin.      EBONIE telephoned pt's daughter, Rosy [phone], to discuss discharge plan.  Pt's daughter advised EBONIE pt does not want pt to return to [facility].  Pt's daughter will contact EBONIE back to further discuss d/c plan.      EBONIE received a call from pt's daughter Rosy to further discuss discharge plan.  Pt's daughter advised EBONIE she is not totally opposed to [facility] but would like to speak with [facility] staff to discuss what happened on pt's previous stay at Rehab.  Pt's daughter stated [facility] is too far for family to visit pt.  EBONIE advised [facility] has a ten day quarantine period from positive before they can admit pt.  EBONIE will continue to work on discharge plan and provide feedback.     PMR consult place.  EBONIE notified [facility] staff that pt's daughter would like to discuss pt's previous admit at [facility].    Cailin Subramanian LMSW  PRN-  [facility]  Ext. [phone]

## 2022-01-27 NOTE — HPI
Selma Lux is a 84 y.o. 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  She has had encephalopathy during her hospital stay and admission to rehab. She tested positive for COVID yesterday.  Paramedics state that she has been having very poor oral intake.      Collateral information from her daughter.  She notes that she has had difficulty breathing for several months.  She states she has cryptogenic pneumonia and received steroids and breathing treatments for this. 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  She noted that she had been on the ventilator and really hated being on the ventilator.  However, she would want to be intubated if her life depended on it.  She would want to try everything possible to avoid intubation.  She noted that she tested positive for COVID yesterday along with her mother.  She states that she is not able to come see her mother due to coronavirus.  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     ED course: Patient with positive COVID test and respiratory distress. This seems to be a recurrent problem with her history of cryptogenic organizing pneumonia. Prior chest x-rays seems to have had a similar appearance to the x-ray from today.  Diffuse infiltrates.  This resolved on recent chest x-rays from her admission.  Of note this recent admission is under a different registration under the same name in epic.   Her venous blood gas does not show respiratory failure or CO2  retention. She seems to have encephalopathy but this has been an ongoing issue. Her sodium today is normalized. Patient has markedly infected urine and an elevated white count.  ED started broad-spectrum IV antibiotics.

## 2022-01-28 LAB
ALBUMIN SERPL BCP-MCNC: 2 G/DL (ref 3.5–5.2)
ALP SERPL-CCNC: 88 U/L (ref 55–135)
ALT SERPL W/O P-5'-P-CCNC: 18 U/L (ref 10–44)
ANION GAP SERPL CALC-SCNC: 8 MMOL/L (ref 8–16)
ANISOCYTOSIS BLD QL SMEAR: SLIGHT
APTT BLDCRRT: 32.3 SEC (ref 21–32)
APTT BLDCRRT: 41.3 SEC (ref 21–32)
APTT BLDCRRT: 48.2 SEC (ref 21–32)
AST SERPL-CCNC: 20 U/L (ref 10–40)
BACTERIA BLD CULT: ABNORMAL
BACTERIA BLD CULT: NORMAL
BASO STIPL BLD QL SMEAR: ABNORMAL
BASOPHILS # BLD AUTO: ABNORMAL K/UL (ref 0–0.2)
BASOPHILS NFR BLD: 0 % (ref 0–1.9)
BILIRUB SERPL-MCNC: 0.7 MG/DL (ref 0.1–1)
BUN SERPL-MCNC: 21 MG/DL (ref 8–23)
BURR CELLS BLD QL SMEAR: ABNORMAL
CALCIUM SERPL-MCNC: 7.4 MG/DL (ref 8.7–10.5)
CHLORIDE SERPL-SCNC: 107 MMOL/L (ref 95–110)
CO2 SERPL-SCNC: 22 MMOL/L (ref 23–29)
CREAT SERPL-MCNC: 0.7 MG/DL (ref 0.5–1.4)
DIFFERENTIAL METHOD: ABNORMAL
EOSINOPHIL # BLD AUTO: ABNORMAL K/UL (ref 0–0.5)
EOSINOPHIL NFR BLD: 0.7 % (ref 0–8)
ERYTHROCYTE [DISTWIDTH] IN BLOOD BY AUTOMATED COUNT: 18.5 % (ref 11.5–14.5)
EST. GFR  (AFRICAN AMERICAN): >60 ML/MIN/1.73 M^2
EST. GFR  (NON AFRICAN AMERICAN): >60 ML/MIN/1.73 M^2
GLUCOSE SERPL-MCNC: 79 MG/DL (ref 70–110)
HCT VFR BLD AUTO: 28 % (ref 37–48.5)
HGB BLD-MCNC: 8.7 G/DL (ref 12–16)
IMM GRANULOCYTES # BLD AUTO: ABNORMAL K/UL (ref 0–0.04)
IMM GRANULOCYTES NFR BLD AUTO: ABNORMAL % (ref 0–0.5)
LYMPHOCYTES # BLD AUTO: ABNORMAL K/UL (ref 1–4.8)
LYMPHOCYTES NFR BLD: 12.7 % (ref 18–48)
MAGNESIUM SERPL-MCNC: 1.6 MG/DL (ref 1.6–2.6)
MCH RBC QN AUTO: 28.2 PG (ref 27–31)
MCHC RBC AUTO-ENTMCNC: 31.1 G/DL (ref 32–36)
MCV RBC AUTO: 91 FL (ref 82–98)
MEGAKARYOCYTIC FRAGMENTS: PRESENT
METAMYELOCYTES NFR BLD MANUAL: 2 %
MONOCYTES # BLD AUTO: ABNORMAL K/UL (ref 0.3–1)
MONOCYTES NFR BLD: 11.3 % (ref 4–15)
MYELOCYTES NFR BLD MANUAL: 0.7 %
NEUTROPHILS NFR BLD: 69.3 % (ref 38–73)
NEUTS BAND NFR BLD MANUAL: 3.3 %
NRBC BLD-RTO: 0 /100 WBC
OVALOCYTES BLD QL SMEAR: ABNORMAL
PHOSPHATE SERPL-MCNC: 1.7 MG/DL (ref 2.7–4.5)
PLATELET # BLD AUTO: 273 K/UL (ref 150–450)
PLATELET BLD QL SMEAR: ABNORMAL
PMV BLD AUTO: 11.4 FL (ref 9.2–12.9)
POIKILOCYTOSIS BLD QL SMEAR: ABNORMAL
POLYCHROMASIA BLD QL SMEAR: ABNORMAL
POTASSIUM SERPL-SCNC: 3.7 MMOL/L (ref 3.5–5.1)
PROT SERPL-MCNC: 4.6 G/DL (ref 6–8.4)
RBC # BLD AUTO: 3.09 M/UL (ref 4–5.4)
SODIUM SERPL-SCNC: 137 MMOL/L (ref 136–145)
WBC # BLD AUTO: 14.05 K/UL (ref 3.9–12.7)

## 2022-01-28 PROCEDURE — 94640 AIRWAY INHALATION TREATMENT: CPT

## 2022-01-28 PROCEDURE — 20600001 HC STEP DOWN PRIVATE ROOM

## 2022-01-28 PROCEDURE — 84100 ASSAY OF PHOSPHORUS: CPT | Performed by: EMERGENCY MEDICINE

## 2022-01-28 PROCEDURE — C9399 UNCLASSIFIED DRUGS OR BIOLOG: HCPCS | Performed by: EMERGENCY MEDICINE

## 2022-01-28 PROCEDURE — 92526 ORAL FUNCTION THERAPY: CPT

## 2022-01-28 PROCEDURE — 99233 SBSQ HOSP IP/OBS HIGH 50: CPT | Mod: CR,,, | Performed by: STUDENT IN AN ORGANIZED HEALTH CARE EDUCATION/TRAINING PROGRAM

## 2022-01-28 PROCEDURE — 27000207 HC ISOLATION

## 2022-01-28 PROCEDURE — 99223 1ST HOSP IP/OBS HIGH 75: CPT | Mod: ,,, | Performed by: INTERNAL MEDICINE

## 2022-01-28 PROCEDURE — 85027 COMPLETE CBC AUTOMATED: CPT

## 2022-01-28 PROCEDURE — 25000003 PHARM REV CODE 250: Performed by: EMERGENCY MEDICINE

## 2022-01-28 PROCEDURE — 94761 N-INVAS EAR/PLS OXIMETRY MLT: CPT

## 2022-01-28 PROCEDURE — 83735 ASSAY OF MAGNESIUM: CPT | Performed by: EMERGENCY MEDICINE

## 2022-01-28 PROCEDURE — 63600175 PHARM REV CODE 636 W HCPCS: Performed by: STUDENT IN AN ORGANIZED HEALTH CARE EDUCATION/TRAINING PROGRAM

## 2022-01-28 PROCEDURE — 85007 BL SMEAR W/DIFF WBC COUNT: CPT

## 2022-01-28 PROCEDURE — 99233 PR SUBSEQUENT HOSPITAL CARE,LEVL III: ICD-10-PCS | Mod: CR,,, | Performed by: STUDENT IN AN ORGANIZED HEALTH CARE EDUCATION/TRAINING PROGRAM

## 2022-01-28 PROCEDURE — 25000242 PHARM REV CODE 250 ALT 637 W/ HCPCS: Performed by: EMERGENCY MEDICINE

## 2022-01-28 PROCEDURE — 25000003 PHARM REV CODE 250: Performed by: STUDENT IN AN ORGANIZED HEALTH CARE EDUCATION/TRAINING PROGRAM

## 2022-01-28 PROCEDURE — 99223 PR INITIAL HOSPITAL CARE,LEVL III: ICD-10-PCS | Mod: ,,, | Performed by: INTERNAL MEDICINE

## 2022-01-28 PROCEDURE — 63600175 PHARM REV CODE 636 W HCPCS

## 2022-01-28 PROCEDURE — 80053 COMPREHEN METABOLIC PANEL: CPT | Performed by: EMERGENCY MEDICINE

## 2022-01-28 PROCEDURE — 25000003 PHARM REV CODE 250

## 2022-01-28 PROCEDURE — 63600175 PHARM REV CODE 636 W HCPCS: Performed by: EMERGENCY MEDICINE

## 2022-01-28 PROCEDURE — 36415 COLL VENOUS BLD VENIPUNCTURE: CPT | Performed by: STUDENT IN AN ORGANIZED HEALTH CARE EDUCATION/TRAINING PROGRAM

## 2022-01-28 PROCEDURE — 85730 THROMBOPLASTIN TIME PARTIAL: CPT | Performed by: STUDENT IN AN ORGANIZED HEALTH CARE EDUCATION/TRAINING PROGRAM

## 2022-01-28 RX ORDER — FUROSEMIDE 20 MG/1
20 TABLET ORAL DAILY
Status: DISCONTINUED | OUTPATIENT
Start: 2022-01-29 | End: 2022-01-29

## 2022-01-28 RX ORDER — AMOXICILLIN AND CLAVULANATE POTASSIUM 875; 125 MG/1; MG/1
1 TABLET, FILM COATED ORAL EVERY 12 HOURS
Status: DISCONTINUED | OUTPATIENT
Start: 2022-01-28 | End: 2022-01-28

## 2022-01-28 RX ADMIN — MELATONIN TAB 3 MG 6 MG: 3 TAB at 09:01

## 2022-01-28 RX ADMIN — IPRATROPIUM BROMIDE AND ALBUTEROL SULFATE 3 ML: 2.5; .5 SOLUTION RESPIRATORY (INHALATION) at 12:01

## 2022-01-28 RX ADMIN — VANCOMYCIN HYDROCHLORIDE 1000 MG: 1 INJECTION, POWDER, LYOPHILIZED, FOR SOLUTION INTRAVENOUS at 06:01

## 2022-01-28 RX ADMIN — DEXAMETHASONE SODIUM PHOSPHATE 6 MG: 4 INJECTION INTRA-ARTICULAR; INTRALESIONAL; INTRAMUSCULAR; INTRAVENOUS; SOFT TISSUE at 09:01

## 2022-01-28 RX ADMIN — PANTOPRAZOLE SODIUM 40 MG: 40 TABLET, DELAYED RELEASE ORAL at 09:01

## 2022-01-28 RX ADMIN — ALBUTEROL SULFATE 2 PUFF: 108 INHALANT RESPIRATORY (INHALATION) at 12:01

## 2022-01-28 RX ADMIN — MUPIROCIN: 20 OINTMENT TOPICAL at 09:01

## 2022-01-28 RX ADMIN — BARICITINIB 2 MG: 2 TABLET, FILM COATED ORAL at 09:01

## 2022-01-28 RX ADMIN — HEPARIN SODIUM 12 UNITS/KG/HR: 1000 INJECTION INTRAVENOUS; SUBCUTANEOUS at 12:01

## 2022-01-28 RX ADMIN — CEFTRIAXONE 1 G: 1 INJECTION, SOLUTION INTRAVENOUS at 09:01

## 2022-01-28 RX ADMIN — AMPICILLIN 2 G: 2 INJECTION, POWDER, FOR SOLUTION INTRAMUSCULAR; INTRAVENOUS at 09:01

## 2022-01-28 RX ADMIN — ALBUTEROL SULFATE 2 PUFF: 108 INHALANT RESPIRATORY (INHALATION) at 07:01

## 2022-01-28 RX ADMIN — FUROSEMIDE 20 MG: 10 INJECTION, SOLUTION INTRAVENOUS at 09:01

## 2022-01-28 RX ADMIN — IPRATROPIUM BROMIDE AND ALBUTEROL SULFATE 3 ML: 2.5; .5 SOLUTION RESPIRATORY (INHALATION) at 08:01

## 2022-01-28 RX ADMIN — HEPARIN SODIUM 10 UNITS/KG/HR: 1000 INJECTION INTRAVENOUS; SUBCUTANEOUS at 05:01

## 2022-01-28 NOTE — SUBJECTIVE & OBJECTIVE
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 Intermittent fatigue. No increased wob, nausea, vomiting, fevers, chills, night sweats, increased oxygen requirements, chest pain, diarrhea.     Objective:     Vital Signs (Most Recent):  Temp: 98.1 °F (36.7 °C) (01/27/22 2338)  Pulse: 91 (01/27/22 2338)  Resp: 20 (01/27/22 2338)  BP: 114/63 (01/27/22 2338)  SpO2: 98 % (01/27/22 2338) Vital Signs (24h Range):  Temp:  [97.7 °F (36.5 °C)-98.6 °F (37 °C)] 98.1 °F (36.7 °C)  Pulse:  [] 91  Resp:  [18-30] 20  SpO2:  [95 %-100 %] 98 %  BP: (114-167)/(63-87) 114/63     Weight: 68.5 kg (151 lb)  Body mass index is 25.92 kg/m².    Intake/Output Summary (Last 24 hours) at 1/28/2022 0002  Last data filed at 1/27/2022 0500  Gross per 24 hour   Intake --   Output 400 ml   Net -400 ml     Physical Exam  Gen: in NAD, appears stated age, appears fatigued  Neuro: AAOx3, motor, sensory, and strength grossly intact BL  HEENT: NTNC, EOMI, PERRL, MMM  CVS: RRR, no m/r/g; S1/S2 auscultated with no S3 or S4; capillary refill < 2 sec  Resp:CTA BL, no w/r/r, no belabored breathing or accessory muscle use appreciated   Abd: BS+ in all 4 quadrants; NTND, soft to palpation; no organomegaly appreciated   Extrem: pulses full, equal, and regular over all 4 extremities; no UE or LE edema BL    Significant Labs:   All pertinent labs within the past 24 hours have been reviewed.  Blood Culture: No results for input(s): LABBLOO in the last 48 hours.  CBC:   Recent Labs   Lab 01/26/22 0426   WBC 9.19   HGB 7.9*   HCT 25.4*        CMP:   Recent Labs   Lab 01/26/22 0426      K 3.3*      CO2 22*   GLU 87   BUN 29*   CREATININE 1.0   CALCIUM 7.5*   PROT 4.7*   ALBUMIN 1.9*   BILITOT 1.1*   ALKPHOS 90   AST 19   ALT 10   ANIONGAP 11   EGFRNONAA 51.9*     Urine Culture: No results for input(s): LABURIN in the last 48 hours.    Significant Imaging: I have reviewed all pertinent imaging results/findings within the past 24  hours.     TTE 01/27:  · The left ventricle is normal in size with concentric remodeling and normal systolic function. The estimated ejection fraction is 55%.  · Normal right ventricular size with normal right ventricular systolic function.  · Normal left ventricular diastolic function.  · Mild mitral regurgitation.  · No evidence of intracardiac vegetation

## 2022-01-28 NOTE — CONSULTS
Inpatient consult to Physical Medicine Rehab  Consult performed by: Pam Katz NP  Consult ordered by: Jovana Sahni MD      Consult received.  Reviewed patient history and current admission.  PM&R following.  Pam Katz NP  Physical Medicine & Rehabilitation   01/28/2022

## 2022-01-28 NOTE — PROGRESS NOTES
PM&R Recommendation:     At this time, the PM&R team has reviewed this patient's ongoing medical case including inpatient diagnosis, medical history, clinical examination, labs, vitals, current social and functional history to provide the post-acute recommendation as follows:     RECOMMENDATIONS: inpatient rehabilitation due to good motivation/participation with therapies and good potential for recovery.    MEDICAL STABILITY:     At this time, barriers for post-acute rehab admission include removal of isolation and transition to Lasix PO.     We will continue to follow.     MENDOZA Kruger, FNP-C  Physical Medicine & Rehabilitation   01/28/2022

## 2022-01-28 NOTE — HPI
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 She was admitted to ICU for acute hypoxic respiratory failure (required 8L NC), encephalopathy. Admit blood culture on 1/23 with E faecalis (1/4 anaerobic bottle), repeat blood culture on 1/24 with CONS. TTE without evidence of vegetation. Urine culture with E coli. Patient was treated with empiric vancomycin and pip-tazo, then vancomycin and CTX. Regarding to COVID-19, she was treated with remdesivir, baricitinib, and dexamethasone. She is stepped down on 1/27. Our service is consulted for antibiotic assistance.     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 She denies dysuria, but endorses discomfort when "touching ()." She denies abdominal pain or diarrhea, but states (abdomen) "is moving too fast." No fever, chills, N/V, rash.        "

## 2022-01-28 NOTE — CARE UPDATE
RAPID RESPONSE NURSE ROUND       Rounding completed with charge RN, Glenda. No concerns verbalized at this time. Instructed to call [phone] for further concerns or assistance.

## 2022-01-28 NOTE — PLAN OF CARE
Problem: Adult Inpatient Plan of Care  Goal: Plan of Care Review  Outcome: Ongoing, Progressing     Problem: Infection  Goal: Absence of Infection Signs and Symptoms  Outcome: Ongoing, Progressing     Problem: Impaired Wound Healing  Goal: Optimal Wound Healing  Outcome: Ongoing, Progressing     Problem: Fall Injury Risk  Goal: Absence of Fall and Fall-Related Injury  Outcome: Ongoing, Progressing     Pt remained free from falls or injuries this shift. Pt turned q2hrs. Multiple areas of open superficial wounds to buttocks and perineal area, triad cream applied. Pt incontinent but refused purewick. Pt rested well through the night.  No confusion this shift. Pt w frequent congested, productive cough but remains on RA. Afebrile. Heparin infusing @10u/kg/hr=6ml/hr

## 2022-01-28 NOTE — PLAN OF CARE
Problem: SLP Goal  Goal: SLP Goal  Description: Speech Language Pathology Goals  Goals expected to be met by 1/31:  1. Patient will tolerate a dysphagia soft diet and thin liquids with no overt signs of airway compromise.   2. Patient will complete functional memory tasks with 70% acc given  min A.   3. Patient will orient x4 indep.   4. Patient will complete general problem solving tasks with 60% acc given  min A.   5. Patient will participate in further assessments as approp.           Outcome: Met

## 2022-01-28 NOTE — PROGRESS NOTES
[facility] - [facility] Medicine  Progress Note    Patient Name: Selma Lux  MRN: [id number]  Patient Class: IP- Inpatient   Admission Date: 1/23/2022  Length of Stay: 5 days  Attending Physician: Jovana Sahni MD  Primary Care Provider: Bhargav Hirsch MD        Subjective:     Principal Problem:Acute hypoxemic respiratory failure    HPI:  Selma Lux is a 84 y.o. 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  She has had encephalopathy during her hospital stay and admission to rehab. She tested positive for COVID yesterday.  Paramedics state that she has been having very poor oral intake.      Collateral information from her daughter.  She notes that she has had difficulty breathing for several months.  She states she has cryptogenic pneumonia and received steroids and breathing treatments for this. 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  She noted that she had been on the ventilator and really hated being on the ventilator.  However, she would want to be intubated if her life depended on it.  She would want to try everything possible to avoid intubation.  She noted that she tested positive for COVID yesterday along with her mother.  She states that she is not able to come see her mother due to coronavirus.  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     ED course: Patient with positive COVID test and respiratory distress. This seems to be a recurrent problem  with her history of cryptogenic organizing pneumonia. Prior chest x-rays seems to have had a similar appearance to the x-ray from today.  Diffuse infiltrates.  This resolved on recent chest x-rays from her admission.  Of note this recent admission is under a different registration under the same name in epic.   Her venous blood gas does not show respiratory failure or CO2 retention. She seems to have encephalopathy but this has been an ongoing issue. Her sodium today is normalized. Patient has markedly infected urine and an elevated white count.  ED started broad-spectrum IV antibiotics.      Overview/Hospital Course:  Selmakirstne Lux is a 84 y.o. 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  She has had encephalopathy during her hospital stay and admission to rehab. She tested positive for COVID on 1/23.  Paramedics state that she has been having very poor oral intake. Patient was admitted to ICU for hypotension and respiratory distress, anticipating rapid deterioration 2/2 her medical history. Patient is on 4L NC, SpO2 stable, she had episode of hypotensions but not sustained, no pressor indicated. Mentation improved in the morning. Patient required sedation for procedure. Family visit seemed to calm her down. Patient no longer required Precedex gtt. Qtc prolongation resolved, likely 2/2 to chronic plaquenil. Patient sat well on room air. Patient ready to transfer to lower level of care unit      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 Mild confusion concern per patient's daughter, but patient is alert and oriented x3. No increased  wob, nausea, vomiting, fevers, chills, night sweats, increased oxygen requirements, chest pain, diarrhea.     Objective:     Vital Signs (Most Recent):  Temp: 98.1 °F (36.7 °C) (01/27/22 2338)  Pulse: 91 (01/27/22 2338)  Resp: 20 (01/27/22 2338)  BP: 114/63 (01/27/22 2338)  SpO2: 98 % (01/27/22 2338) Vital Signs (24h Range):  Temp:  [97.7 °F (36.5 °C)-98.6 °F (37 °C)] 98.1 °F (36.7 °C)  Pulse:  [] 91  Resp:  [18-30] 20  SpO2:  [95 %-100 %] 98 %  BP: (114-167)/(63-87) 114/63     Weight: 68.5 kg (151 lb)  Body mass index is 25.92 kg/m².    Intake/Output Summary (Last 24 hours) at 1/28/2022 0002  Last data filed at 1/27/2022 0500  Gross per 24 hour   Intake --   Output 400 ml   Net -400 ml     Physical Exam  Gen: in NAD, appears stated age, appears ill  Neuro: AAOx3, motor, sensory, and strength grossly intact BL  HEENT: NTNC, EOMI, PERRL, MMM  CVS: RRR, no m/r/g; S1/S2 auscultated with no S3 or S4; capillary refill < 2 sec  Resp:CTA BL, no w/r/r, no belabored breathing or accessory muscle use appreciated   Abd: BS+ in all 4 quadrants; NTND, soft to palpation; no organomegaly appreciated   Extrem: pulses full, equal, and regular over all 4 extremities; no UE or LE edema BL    Significant Labs:   All pertinent labs within the past 24 hours have been reviewed.  Blood Culture: No results for input(s): LABBLOO in the last 48 hours.  CBC:   Recent Labs   Lab 01/26/22 0426   WBC 9.19   HGB 7.9*   HCT 25.4*        CMP:   Recent Labs   Lab 01/26/22 0426      K 3.3*      CO2 22*   GLU 87   BUN 29*   CREATININE 1.0   CALCIUM 7.5*   PROT 4.7*   ALBUMIN 1.9*   BILITOT 1.1*   ALKPHOS 90   AST 19   ALT 10   ANIONGAP 11   EGFRNONAA 51.9*     Urine Culture: No results for input(s): LABURIN in the last 48 hours.    Significant Imaging: I have reviewed all pertinent imaging results/findings within the past 24 hours.      Assessment/Plan:     * Acute hypoxemic respiratory failure  - Resolved- appeared to be 2/2  CHF, COPD and Interstitial lung disease + COVID-19 infection  - Stable on room air    COPD exacerbation  - Continue duoneb q6h- change to PRN 01/28     Chronic systolic (congestive) heart failure  - TTE 02/27/20:  Grade 1 diastolic dysfunction, EF 55%  - No exacerbation at this time  - Stable, continue GDMT     AF (paroxysmal atrial fibrillation)  - Heparin drip at thi stime  - Recent head bleed 12/28  - CHADSVASC of 7-home use of Eliquis  - Will need to be transition back to Eliquis prior to DC      Long QT interval  - Resolved  - Last QTc wnl; likely 2/2 plaquenil use      Hypertension, essential  - holding home amlodipine     UTI (urinary tract infection)  - UCx w/Ecoli 01/23- vanc and rocephin- received 3d of appropriate antibiotics (zosyn and rocephin)  - will add 2 additional days of rocephin based on susceptibilities to complete 5 day course      Chronic renal failure syndrome, stage 3 (moderate)  - stable renal function   - strict I and O  - on lasix 20mg IV qday     Multinodular goiter  - Enlarged thyroid gland resulting in leftward deviation and mass effect upon the airway => anticipate difficult airway access   - Prior consultations with surgery, patient lost following up due to multiple recent medical events      Oropharyngeal dysphagia  - Evaluated by SLP:   Diet recommendations:  Dental Soft, Thin   Aspiration Precautions: 1 bite/sip at a time, Assistance with meals, Avoid talking while eating, Eliminate distractions, Feed only when awake/alert, HOB to 90 degrees, Meds whole 1 at a time, Remain upright 30 minutes post meal and Small bites/sips     - Diet ordered     PUD (peptic ulcer disease)  - Continue PPI      Immunocompromised state due to drug therapy  - Patient is on chronic prednisone for her , on Plaquenil and Cellcept for RA.   - Historically prescribe bactrim for PCP prophylaxis.    Pneumonia due to COVID-19 virus  - Anticoagulation: heparin  - Antibiotics: vanc and zosyn => vanc and  rocephin   - Remdesivir x 5 days (1/23), holding plaquenil (QT prolongation and negative effect to remdesivir)   - Baricitinib x 14 days (1/23), holding Cellcept   - Dexamethasone 6mg x 10 days (1/23), holding prednisone   - on room air  - Continuous pulse ox  - Airborne + contact precautions        VTE Risk Mitigation (From admission, onward)         Ordered     heparin 25,000 units in dextrose 5% (100 units/ml) IV bolus from bag - ADDITIONAL PRN BOLUS - 60 units/kg  As needed (PRN)        Question:  Heparin Infusion Adjustment (DO NOT MODIFY ANSWER)  Answer:  [email or web address]\epic\Images\Pharmacy\HeparinInfusions\heparin LOW INTENSITY nomogram for [facility] [id number].pdf    01/23/22 1741     heparin 25,000 units in dextrose 5% (100 units/ml) IV bolus from bag - ADDITIONAL PRN BOLUS - 30 units/kg  As needed (PRN)        Question:  Heparin Infusion Adjustment (DO NOT MODIFY ANSWER)  Answer:  \\[email or web address]\epic\Images\Pharmacy\HeparinInfusions\heparin LOW INTENSITY nomogram for [facility] [id number].pdf    01/23/22 1741     heparin 25,000 units in dextrose 5% 250 mL (100 units/mL) infusion LOW INTENSITY nomogram - [facility]  Continuous        Question Answer Comment   Heparin Infusion Adjustment (DO NOT MODIFY ANSWER) \\[email or web address]\epic\Images\Pharmacy\HeparinInfusions\heparin LOW INTENSITY nomogram for [facility] [id number].pdf    Begin at (in units/kg/hr) 12        01/23/22 1741     IP VTE HIGH RISK PATIENT  Once         01/23/22 1259     Place sequential compression device  Until discontinued         01/23/22 1259                Discharge Planning   LETICIA: 1/28/2022     Code Status: Full Code   Is the patient medically ready for discharge?:     Reason for patient still in hospital (select all that apply): Patient trending condition  Discharge Plan A: Rehab   Discharge Delays: None known at this time              Jovana Sahni MD  Department of Hospital Medicine   [facility] - Telemetry Stepdown

## 2022-01-28 NOTE — RESPIRATORY THERAPY
RAPID RESPONSE RESPIRATORY CHART CHECK       Chart check completed, O2 order discontinued, no concerns at this time.   Call [phone] for further concerns or assistance.

## 2022-01-28 NOTE — PT/OT/SLP PROGRESS
Physical Therapy      Patient Name:  Selma Lux   MRN:  [id number]    Patient not seen today secondary to Patient unwilling to participate (1505: adamant and continuous refusal, max encouragment.). Will follow-up next day scheduled.

## 2022-01-28 NOTE — PT/OT/SLP PROGRESS
"Speech Language Pathology Treatment    Patient Name:  Selma Lux   MRN:  [id number]  Admitting Diagnosis: Acute hypoxemic respiratory failure    Recommendations:                 General Recommendations:  Follow-up not indicated  Diet recommendations:  Regular, Liquid Diet Level: Thin   Aspiration Precautions: HOB to 90 degrees, Meds whole 1 at a time and Standard aspiration precautions   General Precautions: Standard, aspiration  Communication strategies:  none    Subjective     "I dont have problems swallowing" per pt  Patient goals: home    Pain/Comfort:  · Pain Rating 1: 0/10  · Pain Rating Post-Intervention 1: 0/10    Respiratory Status: Room air    Objective:     Has the patient been evaluated by SLP for swallowing?   Yes  Keep patient NPO? No   Current Respiratory Status:        Pt. Seen at bedside and denied diffculty swallowing , complaining of food options/poor po intake.  HOB was raised to 90 degree angle and pt. Was observed swallowing 3 ounces of juice via cup with a straw.  No coughing, throat clearing or change in vocal quality following the swallow.  Discussed advancing diet to regular to maximize food options.        Assessment:     Selma Lux is a 86 y.o. female with oral and pharyngeal phases of swallow wfl.    Goals:   Multidisciplinary Problems     SLP Goals     Not on file          Multidisciplinary Problems (Resolved)        Problem: SLP Goal    Goal Priority Disciplines Outcome   SLP Goal   (Resolved)     SLP Met   Description: Speech Language Pathology Goals  Goals expected to be met by 1/31:  1. Patient will tolerate a dysphagia soft diet and thin liquids with no overt signs of airway compromise.   2. Patient will complete functional memory tasks with 70% acc given  min A.   3. Patient will orient x4 indep.   4. Patient will complete general problem solving tasks with 60% acc given  min A.   5. Patient will participate in further assessments as approp.                      "       Plan:     · Patient to be seen:  4 x/week   · Plan of Care expires:     · Plan of Care reviewed with:  patient   · SLP Follow-Up:  No       Discharge recommendations:  rehabilitation facility   Barriers to Discharge:  None    Time Tracking:     SLP Treatment Date:   01/28/22  Speech Start Time:  0940  Speech Stop Time:  0948     Speech Total Time (min):  8 min    Billable Minutes: Treatment Swallowing Dysfunction 8    01/28/2022

## 2022-01-29 LAB
ALBUMIN SERPL BCP-MCNC: 2 G/DL (ref 3.5–5.2)
ALP SERPL-CCNC: 85 U/L (ref 55–135)
ALT SERPL W/O P-5'-P-CCNC: 16 U/L (ref 10–44)
ANION GAP SERPL CALC-SCNC: 6 MMOL/L (ref 8–16)
ANISOCYTOSIS BLD QL SMEAR: SLIGHT
APTT BLDCRRT: 48.9 SEC (ref 21–32)
AST SERPL-CCNC: 14 U/L (ref 10–40)
BACTERIA #/AREA URNS AUTO: ABNORMAL /HPF
BASOPHILS # BLD AUTO: ABNORMAL K/UL (ref 0–0.2)
BASOPHILS NFR BLD: 0 % (ref 0–1.9)
BILIRUB SERPL-MCNC: 0.7 MG/DL (ref 0.1–1)
BILIRUB UR QL STRIP: NEGATIVE
BUN SERPL-MCNC: 20 MG/DL (ref 8–23)
BURR CELLS BLD QL SMEAR: ABNORMAL
CALCIUM SERPL-MCNC: 7.7 MG/DL (ref 8.7–10.5)
CHLORIDE SERPL-SCNC: 103 MMOL/L (ref 95–110)
CLARITY UR REFRACT.AUTO: ABNORMAL
CO2 SERPL-SCNC: 23 MMOL/L (ref 23–29)
COLOR UR AUTO: YELLOW
CREAT SERPL-MCNC: 0.7 MG/DL (ref 0.5–1.4)
DIFFERENTIAL METHOD: ABNORMAL
EOSINOPHIL # BLD AUTO: ABNORMAL K/UL (ref 0–0.5)
EOSINOPHIL NFR BLD: 0 % (ref 0–8)
ERYTHROCYTE [DISTWIDTH] IN BLOOD BY AUTOMATED COUNT: 18.6 % (ref 11.5–14.5)
EST. GFR  (AFRICAN AMERICAN): >60 ML/MIN/1.73 M^2
EST. GFR  (NON AFRICAN AMERICAN): >60 ML/MIN/1.73 M^2
GLUCOSE SERPL-MCNC: 84 MG/DL (ref 70–110)
GLUCOSE UR QL STRIP: NEGATIVE
HCT VFR BLD AUTO: 26.4 % (ref 37–48.5)
HGB BLD-MCNC: 8.1 G/DL (ref 12–16)
HGB UR QL STRIP: NEGATIVE
HYPOCHROMIA BLD QL SMEAR: ABNORMAL
IMM GRANULOCYTES # BLD AUTO: ABNORMAL K/UL (ref 0–0.04)
IMM GRANULOCYTES NFR BLD AUTO: ABNORMAL % (ref 0–0.5)
KETONES UR QL STRIP: NEGATIVE
LEUKOCYTE ESTERASE UR QL STRIP: ABNORMAL
LYMPHOCYTES # BLD AUTO: ABNORMAL K/UL (ref 1–4.8)
LYMPHOCYTES NFR BLD: 5 % (ref 18–48)
MAGNESIUM SERPL-MCNC: 1.5 MG/DL (ref 1.6–2.6)
MCH RBC QN AUTO: 27.8 PG (ref 27–31)
MCHC RBC AUTO-ENTMCNC: 30.7 G/DL (ref 32–36)
MCV RBC AUTO: 91 FL (ref 82–98)
METAMYELOCYTES NFR BLD MANUAL: 3 %
MICROSCOPIC COMMENT: ABNORMAL
MONOCYTES # BLD AUTO: ABNORMAL K/UL (ref 0.3–1)
MONOCYTES NFR BLD: 8 % (ref 4–15)
NEUTROPHILS NFR BLD: 84 % (ref 38–73)
NITRITE UR QL STRIP: NEGATIVE
NRBC BLD-RTO: 0 /100 WBC
PH UR STRIP: 6 [PH] (ref 5–8)
PHOSPHATE SERPL-MCNC: 1.9 MG/DL (ref 2.7–4.5)
PLATELET # BLD AUTO: 266 K/UL (ref 150–450)
PMV BLD AUTO: 10.7 FL (ref 9.2–12.9)
POIKILOCYTOSIS BLD QL SMEAR: SLIGHT
POLYCHROMASIA BLD QL SMEAR: ABNORMAL
POTASSIUM SERPL-SCNC: 3.7 MMOL/L (ref 3.5–5.1)
PROT SERPL-MCNC: 4.6 G/DL (ref 6–8.4)
PROT UR QL STRIP: NEGATIVE
RBC # BLD AUTO: 2.91 M/UL (ref 4–5.4)
RBC #/AREA URNS AUTO: 1 /HPF (ref 0–4)
SCHISTOCYTES BLD QL SMEAR: PRESENT
SODIUM SERPL-SCNC: 132 MMOL/L (ref 136–145)
SP GR UR STRIP: 1.02 (ref 1–1.03)
SQUAMOUS #/AREA URNS AUTO: 1 /HPF
URN SPEC COLLECT METH UR: ABNORMAL
WBC # BLD AUTO: 18.76 K/UL (ref 3.9–12.7)
WBC #/AREA URNS AUTO: 2 /HPF (ref 0–5)
YEAST UR QL AUTO: ABNORMAL

## 2022-01-29 PROCEDURE — 27000207 HC ISOLATION

## 2022-01-29 PROCEDURE — 25000003 PHARM REV CODE 250

## 2022-01-29 PROCEDURE — 63600175 PHARM REV CODE 636 W HCPCS: Performed by: STUDENT IN AN ORGANIZED HEALTH CARE EDUCATION/TRAINING PROGRAM

## 2022-01-29 PROCEDURE — 87070 CULTURE OTHR SPECIMN AEROBIC: CPT | Performed by: STUDENT IN AN ORGANIZED HEALTH CARE EDUCATION/TRAINING PROGRAM

## 2022-01-29 PROCEDURE — 87205 SMEAR GRAM STAIN: CPT | Performed by: STUDENT IN AN ORGANIZED HEALTH CARE EDUCATION/TRAINING PROGRAM

## 2022-01-29 PROCEDURE — 84100 ASSAY OF PHOSPHORUS: CPT | Performed by: EMERGENCY MEDICINE

## 2022-01-29 PROCEDURE — 81001 URINALYSIS AUTO W/SCOPE: CPT | Performed by: STUDENT IN AN ORGANIZED HEALTH CARE EDUCATION/TRAINING PROGRAM

## 2022-01-29 PROCEDURE — 80053 COMPREHEN METABOLIC PANEL: CPT | Performed by: EMERGENCY MEDICINE

## 2022-01-29 PROCEDURE — 85027 COMPLETE CBC AUTOMATED: CPT

## 2022-01-29 PROCEDURE — 87106 FUNGI IDENTIFICATION YEAST: CPT | Performed by: STUDENT IN AN ORGANIZED HEALTH CARE EDUCATION/TRAINING PROGRAM

## 2022-01-29 PROCEDURE — 87040 BLOOD CULTURE FOR BACTERIA: CPT | Performed by: STUDENT IN AN ORGANIZED HEALTH CARE EDUCATION/TRAINING PROGRAM

## 2022-01-29 PROCEDURE — 94761 N-INVAS EAR/PLS OXIMETRY MLT: CPT

## 2022-01-29 PROCEDURE — 83735 ASSAY OF MAGNESIUM: CPT | Performed by: EMERGENCY MEDICINE

## 2022-01-29 PROCEDURE — 63600175 PHARM REV CODE 636 W HCPCS: Performed by: EMERGENCY MEDICINE

## 2022-01-29 PROCEDURE — 25000003 PHARM REV CODE 250: Performed by: STUDENT IN AN ORGANIZED HEALTH CARE EDUCATION/TRAINING PROGRAM

## 2022-01-29 PROCEDURE — 25000003 PHARM REV CODE 250: Performed by: HOSPITALIST

## 2022-01-29 PROCEDURE — 94640 AIRWAY INHALATION TREATMENT: CPT

## 2022-01-29 PROCEDURE — 63600175 PHARM REV CODE 636 W HCPCS

## 2022-01-29 PROCEDURE — 36415 COLL VENOUS BLD VENIPUNCTURE: CPT | Performed by: STUDENT IN AN ORGANIZED HEALTH CARE EDUCATION/TRAINING PROGRAM

## 2022-01-29 PROCEDURE — 25000242 PHARM REV CODE 250 ALT 637 W/ HCPCS: Performed by: EMERGENCY MEDICINE

## 2022-01-29 PROCEDURE — 99233 PR SUBSEQUENT HOSPITAL CARE,LEVL III: ICD-10-PCS | Mod: CR,,, | Performed by: STUDENT IN AN ORGANIZED HEALTH CARE EDUCATION/TRAINING PROGRAM

## 2022-01-29 PROCEDURE — 25000003 PHARM REV CODE 250: Performed by: EMERGENCY MEDICINE

## 2022-01-29 PROCEDURE — 85730 THROMBOPLASTIN TIME PARTIAL: CPT

## 2022-01-29 PROCEDURE — 85007 BL SMEAR W/DIFF WBC COUNT: CPT

## 2022-01-29 PROCEDURE — 99233 SBSQ HOSP IP/OBS HIGH 50: CPT | Mod: CR,,, | Performed by: STUDENT IN AN ORGANIZED HEALTH CARE EDUCATION/TRAINING PROGRAM

## 2022-01-29 PROCEDURE — C9399 UNCLASSIFIED DRUGS OR BIOLOG: HCPCS | Performed by: EMERGENCY MEDICINE

## 2022-01-29 PROCEDURE — 20600001 HC STEP DOWN PRIVATE ROOM

## 2022-01-29 PROCEDURE — 99900035 HC TECH TIME PER 15 MIN (STAT)

## 2022-01-29 RX ORDER — LOPERAMIDE HYDROCHLORIDE 2 MG/1
2 CAPSULE ORAL 4 TIMES DAILY PRN
Status: DISCONTINUED | OUTPATIENT
Start: 2022-01-29 | End: 2022-02-08 | Stop reason: HOSPADM

## 2022-01-29 RX ORDER — FUROSEMIDE 40 MG/1
40 TABLET ORAL DAILY
Status: DISCONTINUED | OUTPATIENT
Start: 2022-01-30 | End: 2022-02-06

## 2022-01-29 RX ADMIN — PANTOPRAZOLE SODIUM 40 MG: 40 TABLET, DELAYED RELEASE ORAL at 09:01

## 2022-01-29 RX ADMIN — AMPICILLIN 2 G: 2 INJECTION, POWDER, FOR SOLUTION INTRAMUSCULAR; INTRAVENOUS at 06:01

## 2022-01-29 RX ADMIN — LOPERAMIDE HYDROCHLORIDE 2 MG: 2 CAPSULE ORAL at 10:01

## 2022-01-29 RX ADMIN — AMPICILLIN 2 G: 2 INJECTION, POWDER, FOR SOLUTION INTRAMUSCULAR; INTRAVENOUS at 03:01

## 2022-01-29 RX ADMIN — AMPICILLIN 2 G: 2 INJECTION, POWDER, FOR SOLUTION INTRAMUSCULAR; INTRAVENOUS at 05:01

## 2022-01-29 RX ADMIN — ACETAMINOPHEN 650 MG: 325 TABLET ORAL at 02:01

## 2022-01-29 RX ADMIN — BARICITINIB 2 MG: 2 TABLET, FILM COATED ORAL at 09:01

## 2022-01-29 RX ADMIN — IPRATROPIUM BROMIDE AND ALBUTEROL SULFATE 3 ML: 2.5; .5 SOLUTION RESPIRATORY (INHALATION) at 08:01

## 2022-01-29 RX ADMIN — IPRATROPIUM BROMIDE AND ALBUTEROL SULFATE 3 ML: 2.5; .5 SOLUTION RESPIRATORY (INHALATION) at 01:01

## 2022-01-29 RX ADMIN — DEXAMETHASONE SODIUM PHOSPHATE 6 MG: 4 INJECTION INTRA-ARTICULAR; INTRALESIONAL; INTRAMUSCULAR; INTRAVENOUS; SOFT TISSUE at 09:01

## 2022-01-29 RX ADMIN — HEPARIN SODIUM 11.96 UNITS/KG/HR: 1000 INJECTION INTRAVENOUS; SUBCUTANEOUS at 07:01

## 2022-01-29 RX ADMIN — AMPICILLIN 2 G: 2 INJECTION, POWDER, FOR SOLUTION INTRAMUSCULAR; INTRAVENOUS at 09:01

## 2022-01-29 RX ADMIN — FUROSEMIDE 20 MG: 20 TABLET ORAL at 09:01

## 2022-01-29 RX ADMIN — MELATONIN TAB 3 MG 6 MG: 3 TAB at 09:01

## 2022-01-29 RX ADMIN — IPRATROPIUM BROMIDE AND ALBUTEROL SULFATE 3 ML: 2.5; .5 SOLUTION RESPIRATORY (INHALATION) at 02:01

## 2022-01-29 NOTE — ASSESSMENT & PLAN NOTE
- BCx 01/23- Enterococcus faecalis  - Repeat BCx 01/24- contaminant, otherwise NGTD  - Appreciate ID recs: 14d total course of antibiotics- EOT 02/07/22  - Continue amp 2g q4h  - Obtain weekly outpatient laboratory on Monday or Tuesday while on IV antibiotics: CBC, CMP      Fax laboratory results to [facility] at [phone] with attn: Dr. Porter     Outpatient Infectious Diseases clinic follow up will be arranged and found in patient calendar.     Prior to discharge, please ensure the patient's follow-up has been scheduled.  If there is still no follow-up scheduled in Infectious Diseases clinic, please send an EPIC message to Gisele Smith in Infectious Diseases.

## 2022-01-29 NOTE — ASSESSMENT & PLAN NOTE
- TTE 02/27/20:  Grade 1 diastolic dysfunction, EF 55%  - No exacerbation at this time  - Stable, continue GDMT

## 2022-01-29 NOTE — ASSESSMENT & PLAN NOTE
- Anticoagulation: heparin  - Remdesivir x 5 days (1/23), holding plaquenil (QT prolongation and negative effect to remdesivir)   - Stop Barcitinib- no current oxygen use, holding Cellcept   - Dexamethasone 6mg x 10 days (1/23), holding prednisone   - on room air  - Continuous pulse ox  - Airborne + contact precautions

## 2022-01-29 NOTE — PROGRESS NOTES
Pharmacokinetic Assessment Follow Up: IV Vancomycin    Vancomycin order has been discontinued. Pharmacy will sign off. Please reconsult as needed! Thank you!    Please contact pharmacy for questions regarding this assessment.    Thank you for the consult,   Carmen Garcia

## 2022-01-29 NOTE — SUBJECTIVE & OBJECTIVE
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 Productive cough. No fevers in past 24hrs. Spoke w/daughter today, Susu- she reports that she feels that her strength has improved since last visit. No nausea, vomiting, chills, dysuria, hematuria, abd pain, constipation.    Objective:     Vital Signs (Most Recent):  Temp: 98.8 °F (37.1 °C) (01/29/22 1145)  Pulse: 98 (01/29/22 1415)  Resp: (!) 22 (01/29/22 1350)  BP: (!) 119/48 (01/29/22 1145)  SpO2: 98 % (01/29/22 1350) Vital Signs (24h Range):  Temp:  [97.6 °F (36.4 °C)-98.8 °F (37.1 °C)] 98.8 °F (37.1 °C)  Pulse:  [] 98  Resp:  [20-26] 22  SpO2:  [97 %-100 %] 98 %  BP: (101-124)/(48-80) 119/48     Weight: 68.5 kg (151 lb)  Body mass index is 25.92 kg/m².    Intake/Output Summary (Last 24 hours) at 1/29/2022 1552  Last data filed at 1/29/2022 1200  Gross per 24 hour   Intake 260.22 ml   Output --   Net 260.22 ml     Physical Exam  Gen: in NAD, appears stated age, appears fatigued  Neuro: AAOx3, motor, sensory, and strength grossly intact BL  HEENT: NTNC, EOMI, PERRL, MMM  CVS: RRR, no m/r/g; S1/S2 auscultated with no S3 or S4; capillary refill < 2 sec  Resp:productive cough, transmitted upper respiratory sounds, no wheezes or focal crackles, no belabored breathing or accessory muscle use appreciated   Abd: BS+ in all 4 quadrants; NTND, soft to palpation; no organomegaly appreciated   Extrem: pulses full, equal, and regular over all 4 extremities; no UE or LE edema BL    Significant Labs:   All pertinent labs within the past 24 hours have been reviewed.  Blood Culture: No results for input(s): LABBLOO in the last 48 hours.  CBC:   Recent Labs   Lab 01/28/22 0231 01/29/22  0330   WBC 14.05* 18.76*   HGB 8.7* 8.1*   HCT 28.0* 26.4*    266     CMP:   Recent Labs   Lab 01/28/22 0231 01/29/22  0330    132*   K 3.7 3.7    103   CO2 22* 23   GLU 79 84   BUN 21 20   CREATININE 0.7 0.7   CALCIUM 7.4* 7.7*   PROT 4.6* 4.6*   ALBUMIN 2.0*  2.0*   BILITOT 0.7 0.7   ALKPHOS 88 85   AST 20 14   ALT 18 16   ANIONGAP 8 6*   EGFRNONAA >60.0 >60.0     Urine Culture: No results for input(s): LABURIN in the last 48 hours.    Significant Imaging: I have reviewed all pertinent imaging results/findings within the past 24 hours.     TTE 01/27:  · The left ventricle is normal in size with concentric remodeling and normal systolic function. The estimated ejection fraction is 55%.  · Normal right ventricular size with normal right ventricular systolic function.  · Normal left ventricular diastolic function.  · Mild mitral regurgitation.  · No evidence of intracardiac vegetation    CXR:  FINDINGS:  Cardiac silhouette is unchanged.  Similar prominence of the upper mediastinum in this patient known goiter.  Scattered interstitial and airspace opacity with slight clearing at the right upper lobe and left base.  No significant pleural effusion or gross pneumothorax.  Advanced glenohumeral DJD.     Impression:     As above.

## 2022-01-29 NOTE — ASSESSMENT & PLAN NOTE
- Patient is on chronic prednisone for her , on Plaquenil and Cellcept for RA.   - Historically prescribe bactrim for PCP prophylaxis  - Holding prednisone as currently administering dex  - holding Plaquenil and cellcept as well

## 2022-01-29 NOTE — ASSESSMENT & PLAN NOTE
- Heparin drip at [facility]  - Recent head bleed 12/28  - CHADSVASC of 7-home use of Eliquis  - Will need to be transition back to Eliquis prior to DC

## 2022-01-29 NOTE — ASSESSMENT & PLAN NOTE
- Anticoagulation: heparin  - Remdesivir x 5 days (1/23), holding plaquenil (QT prolongation and negative effect to remdesivir)   - Baricitinib x 14 days (1/23), holding Cellcept   - Dexamethasone 6mg x 10 days (1/23), holding prednisone   - on room air  - Continuous pulse ox  - Airborne + contact precautions

## 2022-01-29 NOTE — PLAN OF CARE
Problem: Infection  Goal: Absence of Infection Signs and Symptoms  Outcome: Ongoing, Progressing     Problem: Adult Inpatient Plan of Care  Goal: Plan of Care Review  Outcome: Ongoing, Progressing  Goal: Patient-Specific Goal (Individualized)  Outcome: Ongoing, Progressing  Goal: Absence of Hospital-Acquired Illness or Injury  Outcome: Ongoing, Progressing  Goal: Optimal Comfort and Wellbeing  Outcome: Ongoing, Progressing  Goal: Readiness for Transition of Care  Outcome: Ongoing, Progressing     Problem: Impaired Wound Healing  Goal: Optimal Wound Healing  Outcome: Ongoing, Progressing     Problem: Fall Injury Risk  Goal: Absence of Fall and Fall-Related Injury  Outcome: Ongoing, Progressing     Problem: Skin Injury Risk Increased  Goal: Skin Health and Integrity  Outcome: Ongoing, Progressing   She did really well today.  She has tolerated her IV medications without problem.  Her heparin continues and at noon was not therapeutic so adjustments were made.  Will continue to monitor.  She has a 1830 aPTT.  Lab is here now.

## 2022-01-29 NOTE — PROGRESS NOTES
[facility] - Telemetry [facility] Medicine  Progress Note    Patient Name: Selma Lux  MRN: [id number]  Patient Class: IP- Inpatient   Admission Date: 1/23/2022  Length of Stay: 5 days  Attending Physician: Jovana Sahni MD  Primary Care Provider: Bhargav Hirsch MD        Subjective:     Principal Problem:Acute hypoxemic respiratory failure        HPI:  Selma Lux is a 84 y.o. 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  She has had encephalopathy during her hospital stay and admission to rehab. She tested positive for COVID yesterday.  Paramedics state that she has been having very poor oral intake.      Collateral information from her daughter.  She notes that she has had difficulty breathing for several months.  She states she has cryptogenic pneumonia and received steroids and breathing treatments for this. 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  She noted that she had been on the ventilator and really hated being on the ventilator.  However, she would want to be intubated if her life depended on it.  She would want to try everything possible to avoid intubation.  She noted that she tested positive for COVID yesterday along with her mother.  She states that she is not able to come see her mother due to coronavirus.  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     ED course: Patient with positive COVID test and respiratory distress. This seems to be a recurrent problem  with her history of cryptogenic organizing pneumonia. Prior chest x-rays seems to have had a similar appearance to the x-ray from today.  Diffuse infiltrates.  This resolved on recent chest x-rays from her admission.  Of note this recent admission is under a different registration under the same name in epic.   Her venous blood gas does not show respiratory failure or CO2 retention. She seems to have encephalopathy but this has been an ongoing issue. Her sodium today is normalized. Patient has markedly infected urine and an elevated white count.  ED started broad-spectrum IV antibiotics.      Overview/Hospital Course:  Selma Lux is a 84 y.o. 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  She has had encephalopathy during her hospital stay and admission to rehab. She tested positive for COVID on 1/23.  Paramedics state that she has been having very poor oral intake. Patient was admitted to ICU for hypotension and respiratory distress, anticipating rapid deterioration 2/2 her medical history. Patient is on 4L NC, SpO2 stable, she had episode of hypotensions but not sustained, no pressor indicated. Mentation improved in the morning. Patient required sedation for procedure. Family visit seemed to calm her down. Patient no longer required Precedex gtt. Qtc prolongation resolved, likely 2/2 to chronic plaquenil. Patient sat well on room air. Patient ready to transfer to lower level of care unit. ID consulted. Vanc and rocephin Dc'howard 01/28. Transitioned to amp- completing 14d course.       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  Intermittent fatigue. No increased wob, nausea, vomiting, fevers, chills, night sweats, increased oxygen requirements, chest pain, diarrhea.     Objective:     Vital Signs (Most Recent):  Temp: 98.1 °F (36.7 °C) (01/27/22 2338)  Pulse: 91 (01/27/22 2338)  Resp: 20 (01/27/22 2338)  BP: 114/63 (01/27/22 2338)  SpO2: 98 % (01/27/22 2338) Vital Signs (24h Range):  Temp:  [97.7 °F (36.5 °C)-98.6 °F (37 °C)] 98.1 °F (36.7 °C)  Pulse:  [] 91  Resp:  [18-30] 20  SpO2:  [95 %-100 %] 98 %  BP: (114-167)/(63-87) 114/63     Weight: 68.5 kg (151 lb)  Body mass index is 25.92 kg/m².    Intake/Output Summary (Last 24 hours) at 1/28/2022 0002  Last data filed at 1/27/2022 0500  Gross per 24 hour   Intake --   Output 400 ml   Net -400 ml     Physical Exam  Gen: in NAD, appears stated age, appears fatigued  Neuro: AAOx3, motor, sensory, and strength grossly intact BL  HEENT: NTNC, EOMI, PERRL, MMM  CVS: RRR, no m/r/g; S1/S2 auscultated with no S3 or S4; capillary refill < 2 sec  Resp:CTA BL, no w/r/r, no belabored breathing or accessory muscle use appreciated   Abd: BS+ in all 4 quadrants; NTND, soft to palpation; no organomegaly appreciated   Extrem: pulses full, equal, and regular over all 4 extremities; no UE or LE edema BL    Significant Labs:   All pertinent labs within the past 24 hours have been reviewed.  Blood Culture: No results for input(s): LABBLOO in the last 48 hours.  CBC:   Recent Labs   Lab 01/26/22 0426   WBC 9.19   HGB 7.9*   HCT 25.4*        CMP:   Recent Labs   Lab 01/26/22 0426      K 3.3*      CO2 22*   GLU 87   BUN 29*   CREATININE 1.0   CALCIUM 7.5*   PROT 4.7*   ALBUMIN 1.9*   BILITOT 1.1*   ALKPHOS 90   AST 19   ALT 10   ANIONGAP 11   EGFRNONAA 51.9*     Urine Culture: No results for input(s): LABURIN in the last 48 hours.    Significant Imaging: I have reviewed all pertinent imaging results/findings within the past 24 hours.     TTE 01/27:  · The left ventricle is normal in size  with concentric remodeling and normal systolic function. The estimated ejection fraction is 55%.  · Normal right ventricular size with normal right ventricular systolic function.  · Normal left ventricular diastolic function.  · Mild mitral regurgitation.  · No evidence of intracardiac vegetation           Assessment/Plan:      * Acute hypoxemic respiratory failure  - Resolved- appeared to be 2/2 CHF, COPD and Interstitial lung disease + COVID-19 infection  - Stable on room air    Enterococcal bacteremia  - BCx 01/23- Enterococcus faecalis  - Repeat BCx 01/24- contaminant, otherwise NGTD  - ID consulted  - recommending 14d total course of antibiotics- EOT 02/07/22  - Stop vanc/rocephin; begin amp 2g q4h  - Obtain weekly outpatient laboratory on Monday or Tuesday while on IV antibiotics: CBC, CMP      If vancomycin trough is not at target (15-20) prior to discharge, the please perform vancomycin trough before their fourth outpatient dose.     Fax laboratory results to [facility] at [phone] with attn: Dr. Porter     Outpatient Infectious Diseases clinic follow up will be arranged and found in patient calendar.     Prior to discharge, please ensure the patient's follow-up has been scheduled.  If there is still no follow-up scheduled in Infectious Diseases clinic, please send an EPIC message to Gisele Smith in Infectious Diseases.    UTI (urinary tract infection)  - UCx w/Ecoli 01/23- vanc and rocephin- stop and transition to amp- coverage for enterococcus bacteremia  - EOT 02/07/22    COVID-19 virus infection  - Anticoagulation: heparin  - Remdesivir x 5 days (1/23), holding plaquenil (QT prolongation and negative effect to remdesivir)   - Baricitinib x 14 days (1/23), holding Cellcept   - Dexamethasone 6mg x 10 days (1/23), holding prednisone   - on room air  - Continuous pulse ox  - Airborne + contact precautions    Immunocompromised state due to drug therapy  - Patient is on chronic prednisone for her ,  on Plaquenil and Cellcept for RA.   - Historically prescribe bactrim for PCP prophylaxis    COPD exacerbation  - resolved  - continue duoneb q6h PRN for sob/wheeze    Chronic systolic (congestive) heart failure  - TTE 02/27/20:  Grade 1 diastolic dysfunction, EF 55%  - No exacerbation at this time  - Stable, continue GDMT    Multinodular goiter  - Enlarged thyroid gland resulting in leftward deviation and mass effect upon the airway => anticipate difficult airway access   - Prior consultations with surgery, patient lost following up due to multiple recent medical events     AF (paroxysmal atrial fibrillation)  - Heparin drip at [facility]  - Recent head bleed 12/28  - CHADSVASC of 7-home use of Eliquis  - Will need to be transition back to Eliquis prior to DC     Oropharyngeal dysphagia  - Evaluated by SLP- diet recommendations- dental soft, think liquids  - Aspiration precautions: 1 bite/sip at a time, Assistance with meals, Avoid talking while eating, Eliminate distractions, Feed only when awake/alert, HOB to 90 degrees, Meds whole 1 at a time, Remain upright 30 minutes post meal and Small bites/sips      Chronic renal failure syndrome, stage 3 (moderate)  - stable renal function   - strict I and O  - Stop IV lasix 20mg qday- transition to PO lasix 20mg qday    Long QT interval  - last ecg 01/25- QTc 464  - avoid QT prolonging agents  - likely 2/2 plaquenil use    Hypertension, essential  - BP currently well-controlled  - Holding home regimen of amlodipine   - Will continue to monitor and further titrate antihypertensives as clinically indicated     PUD (peptic ulcer disease)  - continue PPI    VTE Risk Mitigation (From admission, onward)         Ordered     heparin 25,000 units in dextrose 5% (100 units/ml) IV bolus from bag - ADDITIONAL PRN BOLUS - 60 units/kg  As needed (PRN)        Question:  Heparin Infusion Adjustment (DO NOT MODIFY ANSWER)  Answer:  [email or web address]\Images\Pharmacy\HeparinInfusions\heparin  LOW INTENSITY nomogram for [facility] [id number]    01/23/22 1741     heparin 25,000 units in dextrose 5% (100 units/ml) IV bolus from bag - ADDITIONAL PRN BOLUS - 30 units/kg  As needed (PRN)        Question:  Heparin Infusion Adjustment (DO NOT MODIFY ANSWER)  Answer:  [email or web address]\Images\Pharmacy\HeparinInfusions\heparin LOW INTENSITY nomogram for [facility] [id number]    01/23/22 1741     heparin 25,000 units in dextrose 5% 250 mL (100 units/mL) infusion LOW INTENSITY nomogram - [facility]  Continuous        Question Answer Comment   Heparin Infusion Adjustment (DO NOT MODIFY ANSWER) [email or web address]\[email or web address]\Images\Pharmacy\HeparinInfusions\heparin LOW INTENSITY nomogram for [facility] [id number]    Begin at (in units/kg/hr) 12        01/23/22 1741     IP VTE HIGH RISK PATIENT  Once         01/23/22 1259     Place sequential compression device  Until discontinued         01/23/22 1259                Discharge Planning   LETICIA: 1/28/2022     Code Status: Full Code   Is the patient medically ready for discharge?:     Reason for patient still in hospital (select all that apply): Patient trending condition  Discharge Plan A: Rehab   Discharge Delays: None known at this time          Jovana Sahni MD  Department of Hospital Medicine   [facility] - Telemetry Stepdown

## 2022-01-29 NOTE — ASSESSMENT & PLAN NOTE
- BP currently well-controlled  - Holding home regimen of amlodipine   - Will continue to monitor and further titrate antihypertensives as clinically indicated

## 2022-01-29 NOTE — CONSULTS
[facility] - Telemetry Stepdown  Infectious Diseases  Consult Note    Patient Name: Selma Lux  MRN: [id number]  Admission Date: 1/23/2022  Hospital Length of Stay: 5 days  Attending Physician: Jovana Sahni MD  Primary Care Provider: Bhargav Hirsch MD     Isolation Status: Airborne and Contact and Droplet    Patient information was obtained from patient, past medical records and ER records    Inpatient consult to Infectious Diseases  Consult performed by: Nathen Paez MD  Consult ordered by: Jovana Sahni MD        Assessment/Plan:     Enterococcal bacteremia  86 y.o. female with PMH seropositive RA  with documented history of cryptogenic organizing pneumonia (on prednisone, MMF), paroxysmal Afib, history of CVA, goiter, recent hospitalization 12/13/21-1/4/22 for R SDH without neurosurgical intervention, complicated by PEA arrest on 12/21/21; admitted on 1/23/2022 from rehab facility with 1 week of SOB; found to have acute hypoxic respiratory failure due to COVID-19, encephalopathy, E faecalis bacteremia ( 1/4 rigoberto bottle on admit BCx; likely GI/ source causing transient bacteremia), and symptomatic E coli UTI.     Currently on vancomycin and ceftriaxone for E faecalis bacteremia and E coli UTI. Can transition to ampicillin to target both species.     Recommendations:  - Can change vancomycin and ceftriaxone to ampicillin 2g IV q4h for total of 14 days. End date 2/7/22. (Please see separate plan of care for OPAT details).   - Continue COVID-19 management per protocol (dexamethasone and baricitinib). Completed remdesivir.  - Agreed to hold of MMF in the setting of ongoing treatment for UTI/bacteremia and COVID-19. Defer to primary/rheum to determine timeframe to resume home RA immunosuppressants.         Thank you for your consult. I will sign off. Please contact us if you have any additional questions.    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 She was admitted to ICU for acute hypoxic respiratory failure (required 8L NC), encephalopathy. Admit blood culture on 1/23 with E faecalis (1/4 anaerobic bottle), repeat blood culture on 1/24 with CONS. TTE without evidence of vegetation. Urine culture with E coli. Patient was treated with empiric vancomycin and pip-tazo, then vancomycin and CTX. Regarding to COVID-19, she was treated with remdesivir, baricitinib, and dexamethasone. She is stepped down on 1/27. Our service is consulted for antibiotic assistance.     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 She denies dysuria, but endorses discomfort when "touching ()." She denies abdominal pain or diarrhea, but states (abdomen) "is moving too fast." No fever, chills, N/V, rash.            Past Medical History:   Diagnosis Date    Acute cystitis without hematuria 2/27/2020    Acute hypoxemic respiratory failure 4/11/2018    Acute respiratory failure with hypoxia 3/2/2020    Altered mental status     Anemia     Arthritis     Bilateral hand pain 3/14/2018    Branch retinal vein occlusion, left eye 2/20/2015    Chronic bilateral low back pain without sciatica 3/23/2017    Chronic renal failure in pediatric patient, stage 3 (moderate) 4/15/2018    Cognitive communication deficit 12/19/2017    Cystoid macular edema, left eye 2/20/2015    Cystoid macular edema, left eye 2/20/2015    DJD (degenerative joint disease) of cervical spine 5/15/2013    Fatty liver " 8/26/2014    Goiter 4/9/2018    Hashimoto's disease     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    Hypertension     Hypertensive encephalopathy     IBS (irritable bowel syndrome) 6/21/2017    IGT (impaired glucose tolerance) 1/12/2016    Iron deficiency anemia secondary to inadequate dietary iron intake 6/24/2013    Joint pain     Keratoconjunctivitis sicca of both eyes not specified as Sjogren's 7/29/2016    Leiomyoma of uterus, unspecified 9/16/2014    Long QT interval 6/29/2016    Due to medication (plaquenil)     Macular edema 1/12/2015    Multinodular goiter 1/12/2016    Neuropathy 8/23/2017    Plaquenil causing adverse effect in therapeutic use 10/7/2016    Pneumococcal vaccine refused 8/17/2016    Pneumonia due to Streptococcus pneumoniae 4/5/2018    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/2015    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    s/p Right Total knee replacement 5/15/2013    Scoliosis of thoracic spine 5/15/2013    Small vessel disease, cerebrovascular 12/28/2017    Stroke     UTI (urinary tract infection) 12/29/2018    Vascular dementia 12/6/2017       Past Surgical History:   Procedure Laterality Date    BREAST CYST EXCISION      CATARACT EXTRACTION      COLONOSCOPY N/A 9/29/2015    Procedure: COLONOSCOPY;  Surgeon: FIDELINA Sanchez MD;  Location: [facility] ([address] [place] [address]);  Service: Endoscopy;  Laterality: N/A;    EYE SURGERY      INJECTION OF ANESTHETIC AGENT AROUND NERVE Left 4/19/2021    Procedure: BLOCK, NERVE, FEMORAL AND OBTURATOR;  Surgeon: Shivam Gonzalez MD;  Location: [facility];  Service: Pain Management;  Laterality: Left;  consent needed    JOINT REPLACEMENT      right knee    KNEE SURGERY Left 12/31/2013    TKR    left parotidectomy      mixed tumor    MA EVAL,SWALLOW FUNCTION,CINE/VIDEO RECORD  6/5/2021         SALIVARY GLAND SURGERY      TONSILLECTOMY      UPPER GASTROINTESTINAL ENDOSCOPY         Review of patient's allergies indicates:  No Known  Allergies    Medications:  Facility-Administered Medications Prior to Admission   Medication    onabotulinumtoxina injection 200 Units     Medications Prior to Admission   Medication Sig    albuterol (PROVENTIL/VENTOLIN HFA) 90 mcg/actuation inhaler Inhale 2 puffs into the lungs every 6 (six) hours as needed for Wheezing. Rescue    amLODIPine (NORVASC) 10 MG tablet Take 1 tablet (10 mg total) by mouth once daily.    apixaban (ELIQUIS) 5 mg Tab Take 1 tablet (5 mg total) by mouth 2 (two) times daily.    atorvastatin (LIPITOR) 40 MG tablet Take 1 tablet (40 mg total) by mouth once daily.    baclofen (LIORESAL) 10 MG tablet Take 0.5-1 tablets (5-10 mg total) by mouth 3 (three) times daily as needed.    ciclopirox (PENLAC) 8 % Soln Apply to affected nails qhs. Clean nails with alcohol q7 days.    furosemide (LASIX) 40 MG tablet Take 1 tablet (40 mg total) by mouth once daily. TAKE 1 TABLET(40 MG) BY MOUTH DAILY    gabapentin (NEURONTIN) 100 MG capsule Take 1-3 capsules (100-300 mg total) by mouth 2 (two) times daily.    gabapentin (NEURONTIN) 300 MG capsule Take 1 capsule (300 mg total) by mouth every evening.    hydrOXYchloroQUINE (PLAQUENIL) 200 mg tablet Take 1 tablet (200 mg total) by mouth 2 (two) times daily.    LIDOcaine (LIDODERM) 5 % Place 1 patch onto the skin once daily. Remove & Discard patch within 12 hours or as directed by MD    magnesium oxide (MAG-OX) 400 mg (241.3 mg magnesium) tablet Take 400 mg by mouth once daily.    melatonin (MELATIN) 3 mg tablet Take 2 tablets (6 mg total) by mouth nightly as needed for Insomnia.    montelukast (SINGULAIR) 10 mg tablet Take 1 tablet (10 mg total) by mouth every evening.    mycophenolate (CELLCEPT) 500 mg Tab Take 1 tablet (500 mg total) by mouth 2 (two) times daily.    omeprazole (PRILOSEC) 20 MG capsule Take 1 capsule (20 mg total) by mouth once daily.    phenazopyridine (PYRIDIUM) 100 MG tablet     polyethylene glycol (GLYCOLAX) 17 gram PwPk  Take 17 g by mouth daily as needed.    predniSONE (DELTASONE) 5 MG tablet Take 1 tablet (5 mg total) by mouth once daily.    sulfamethoxazole-trimethoprim 800-160mg (BACTRIM DS) 800-160 mg Tab     sumatriptan (IMITREX) 50 MG tablet Take 1 tablet (50 mg total) by mouth every 6 (six) hours as needed for Migraine.    thiamine 100 MG tablet Take 100 mg by mouth once daily.    traMADoL (ULTRAM) 50 mg tablet Take 1 tablet (50 mg total) by mouth every 6 (six) hours as needed for Pain.     Antibiotics (From admission, onward)            Start     Stop Route Frequency Ordered    01/28/22 0900  cefTRIAXone (ROCEPHIN) 1 g/50 mL D5W IVPB         01/30 0859 IV Every 24 hours (non-standard times) 01/28/22 0150    01/26/22 1800  vancomycin in dextrose 5 % 1 gram/250 mL IVPB 1,000 mg         -- IV Every 24 hours (non-standard times) 01/26/22 0850        Antifungals (From admission, onward)            None        Antivirals (From admission, onward)    None           Immunization History   Administered Date(s) Administered    COVID-19, MRNA, LN-S, PF (Pfizer) (Purple Cap) 01/09/2021, 01/30/2021, 09/10/2021    Influenza - High Dose - PF (65 years and older) 03/07/2018    PPD Test 05/02/2018, 09/03/2019, 02/29/2020    Pneumococcal Conjugate - 13 Valent 03/07/2018    Tdap 03/07/2018       Family History     Problem Relation (Age of Onset)    Breast cancer Maternal Grandmother    Cancer Father    Heart disease Mother    Hypertension Mother    Hyperthyroidism Mother, Other    Prostate cancer Father        Social History     Socioeconomic History    Marital status:    Tobacco Use    Smoking status: Never Smoker    Smokeless tobacco: Never Used   Substance and Sexual Activity    Alcohol use: Yes     Alcohol/week: 0.0 standard drinks     Comment: very seldom     Drug use: No    Sexual activity: Not Currently     Comment: ,  age 52,    Other Topics Concern    Are you pregnant or think you may be? No     Breast-feeding No     Social Determinants of Health     Financial Resource Strain: Low Risk     Difficulty of Paying Living Expenses: Not hard at all   Food Insecurity: No Food Insecurity    Worried About Running Out of Food in the Last Year: Never true    Ran Out of Food in the Last Year: Never true   Transportation Needs: No Transportation Needs    Lack of Transportation (Medical): No    Lack of Transportation (Non-Medical): No   Physical Activity: Insufficiently Active    Days of Exercise per Week: 2 days    Minutes of Exercise per Session: 60 min   Stress: No Stress Concern Present    Feeling of Stress : Only a little   Social Connections: Unknown    Frequency of Communication with Friends and Family: More than three times a week    Frequency of Social Gatherings with Friends and Family: More than three times a week    Active Member of Clubs or Organizations: Yes    Attends Club or Organization Meetings: More than 4 times per year    Marital Status:      Review of Systems   Constitutional: Negative for chills and fever.   HENT: Negative for congestion and sore throat.    Eyes: Negative for redness and visual disturbance.   Respiratory: Positive for cough and shortness of breath.    Cardiovascular: Negative for chest pain and leg swelling.   Gastrointestinal: Negative for abdominal distention, abdominal pain, nausea and vomiting.   Endocrine: Negative for polyuria.   Genitourinary: Negative for dysuria.   Musculoskeletal: Positive for arthralgias (chronic).   Skin: Positive for wound (sacral). Negative for rash.   Allergic/Immunologic: Negative for food allergies.   Neurological: Positive for weakness. Negative for headaches.   Hematological: Negative for adenopathy. Does not bruise/bleed easily.   Psychiatric/Behavioral: Negative for confusion.     Objective:     Vital Signs (Most Recent):  Temp: 98.1 °F (36.7 °C) (01/28/22 1500)  Pulse: 92 (01/28/22 1500)  Resp: 18 (01/28/22 1245)  BP:  110/63 (01/28/22 1500)  SpO2: 100 % (01/28/22 1500) Vital Signs (24h Range):  Temp:  [97 °F (36.1 °C)-98.6 °F (37 °C)] 98.1 °F (36.7 °C)  Pulse:  [] 92  Resp:  [16-30] 18  SpO2:  [94 %-100 %] 100 %  BP: (101-136)/(59-87) 110/63     Weight: 68.5 kg (151 lb)  Body mass index is 25.92 kg/m².    Estimated Creatinine Clearance: 56.9 mL/min (based on SCr of 0.7 mg/dL).    Physical Exam  Vitals and nursing note reviewed.   Constitutional:       General: She is not in acute distress.  HENT:      Head: Normocephalic and atraumatic.      Right Ear: External ear normal.      Left Ear: External ear normal.      Nose: Nose normal.      Mouth/Throat:      Mouth: Mucous membranes are moist.   Eyes:      General: No scleral icterus.     Extraocular Movements: Extraocular movements intact.      Conjunctiva/sclera: Conjunctivae normal.   Cardiovascular:      Rate and Rhythm: Normal rate and regular rhythm.      Heart sounds: No murmur heard.      Pulmonary:      Effort: Pulmonary effort is normal. No respiratory distress.      Breath sounds: Normal breath sounds. No rhonchi.   Abdominal:      General: Abdomen is flat. Bowel sounds are normal.      Palpations: Abdomen is soft.      Tenderness: There is no abdominal tenderness.   Musculoskeletal:      Cervical back: Normal range of motion and neck supple.      Right lower leg: No edema.      Left lower leg: No edema.   Skin:     General: Skin is warm.      Capillary Refill: Capillary refill takes less than 2 seconds.      Findings: No rash.   Neurological:      Mental Status: She is alert, oriented to person, place, and time and easily aroused.   Psychiatric:         Mood and Affect: Mood normal.         Behavior: Behavior normal.       Significant Labs:   Blood Culture:   Recent Labs   Lab 01/23/22  1023 01/23/22  1024 01/24/22  1616   LABBLOO Gram stain rigoberto bottle: Gram positive cocci in chains resembling Strep   Results called to and read back by: Eliana Harris RN  01/24/2022    02:02  ENTEROCOCCUS FAECALIS* No growth after 5 days. Gram stain aer bottle: Gram positive cocci in clusters resembling Staph   Results called to and read back by: Kelsey Saucedo RN 01/26/2022     11:50  COAGULASE-NEGATIVE STAPHYLOCOCCUS SPECIES  Organism is a probable contaminant  *     CBC:   Recent Labs   Lab 01/28/22  0231   WBC 14.05*   HGB 8.7*   HCT 28.0*        CMP:   Recent Labs   Lab 01/28/22  0231      K 3.7      CO2 22*   GLU 79   BUN 21   CREATININE 0.7   CALCIUM 7.4*   PROT 4.6*   ALBUMIN 2.0*   BILITOT 0.7   ALKPHOS 88   AST 20   ALT 18   ANIONGAP 8   EGFRNONAA >60.0     Urine Culture:   Recent Labs   Lab 01/23/22  1053   LABURIN ESCHERICHIA COLI  >100,000 cfu/ml  *     All pertinent labs within the past 24 hours have been reviewed.    Significant Imaging: I have reviewed all pertinent imaging results/findings within the past 24 hours.

## 2022-01-29 NOTE — ASSESSMENT & PLAN NOTE
- Heparin drip at [facility]  - Recent head bleed 12/28  - CHADSVASC of 7-home use of Eliquis  - Will need to be transition back to Eliquis prior to DC    Spoke to pt re: incident. He states it was not as severe as previous two times but holding on to the shopping cart helped. His significant other was with him at the time.  Pt was bending down to  heavy bag of water softener when he became near-syncopal. He reports bending down below his waist triggers episode. Pt feels fine now. He has an appt with Dr Salamanca next month but did leave a message this morning for his office to call back.  Pt advised against driving at this time, call EP's office again to request a sooner appt given another syncopal episode and assured I will contact Dr Rodgers to see if he can facilitate an earlier appt with EP.  Pt advised to seek emergency care should he experience another episode. He verbalized understanding.

## 2022-01-29 NOTE — ASSESSMENT & PLAN NOTE
- Evaluated by SLP- diet recommendations- dental soft, think liquids  - Aspiration precautions: 1 bite/sip at a time, Assistance with meals, Avoid talking while eating, Eliminate distractions, Feed only when awake/alert, HOB to 90 degrees, Meds whole 1 at a time, Remain upright 30 minutes post meal and Small bites/sips

## 2022-01-29 NOTE — PLAN OF CARE
[facility]  Infectious Diseases Plan of Care Note    Infection: Enterococcal faecalis bacteremia, E coli UTI    Discharge antibiotics: Ampicillin 2g IV q4 hours (can be ampicillin 12 g per day  continuous infusion)    End date of IV antibiotics: 2/7/2022    Weekly outpatient laboratory on Monday or Tuesday while on IV antibiotics.    CBC   CMP     If vancomycin trough is not at target (15-20) prior to discharge, the please perform vancomycin trough before their fourth outpatient dose.    Fax laboratory results to [facility] at [phone] with attn: Dr. Porter    Outpatient Infectious Diseases clinic follow up will be arranged and found in patient calendar.    Prior to discharge, please ensure the patient's follow-up has been scheduled.  If there is still no follow-up scheduled in Infectious Diseases clinic, please send an EPIC message to Gisele Smith in Infectious Diseases.      Nathen Paez MD, MPH  [facility] Fellow

## 2022-01-29 NOTE — ASSESSMENT & PLAN NOTE
- Uptrending WBC  - Increased cough on exam  - neutrophilic predominance  - Has been on dex since 01/23, but no previous leukocytosis after 36hrs of use  - Repeat CXR unremarkable  - UA repeat  - Repeat BCx and Resp Cx for evaluation of additional infectious agents not currently covered w/current treatment regimen

## 2022-01-29 NOTE — ASSESSMENT & PLAN NOTE
- stable renal function   - strict I and O  - Stop IV lasix 20mg qday- transition to PO lasix 20mg qday

## 2022-01-29 NOTE — ASSESSMENT & PLAN NOTE
- Resolved- appeared to be 2/2 CHF, COPD and Interstitial lung disease + COVID-19 infection  - Stable on room air

## 2022-01-29 NOTE — RESPIRATORY THERAPY
RAPID RESPONSE RESPIRATORY CHART CHECK       Chart check completed, instructed to call [phone] for further concerns or assistance.

## 2022-01-29 NOTE — ASSESSMENT & PLAN NOTE
- Patient is on chronic prednisone for her , on Plaquenil and Cellcept for RA.   - Historically prescribe bactrim for PCP prophylaxis

## 2022-01-29 NOTE — PROGRESS NOTES
[facility] - Telemetry [facility] Medicine  Progress Note    Patient Name: Selma Lux  MRN: [id number]  Patient Class: IP- Inpatient   Admission Date: 1/23/2022  Length of Stay: 6 days  Attending Physician: Jovana Sahni MD  Primary Care Provider: Bhargav Hirsch MD        Subjective:     Principal Problem:Acute hypoxemic respiratory failure        HPI:  Selma Lux is a 84 y.o. 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  She has had encephalopathy during her hospital stay and admission to rehab. She tested positive for COVID yesterday.  Paramedics state that she has been having very poor oral intake.      Collateral information from her daughter.  She notes that she has had difficulty breathing for several months.  She states she has cryptogenic pneumonia and received steroids and breathing treatments for this. 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  She noted that she had been on the ventilator and really hated being on the ventilator.  However, she would want to be intubated if her life depended on it.  She would want to try everything possible to avoid intubation.  She noted that she tested positive for COVID yesterday along with her mother.  She states that she is not able to come see her mother due to coronavirus.  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     ED course: Patient with positive COVID test and respiratory distress. This seems to be a recurrent problem  with her history of cryptogenic organizing pneumonia. Prior chest x-rays seems to have had a similar appearance to the x-ray from today.  Diffuse infiltrates.  This resolved on recent chest x-rays from her admission.  Of note this recent admission is under a different registration under the same name in epic.   Her venous blood gas does not show respiratory failure or CO2 retention. She seems to have encephalopathy but this has been an ongoing issue. Her sodium today is normalized. Patient has markedly infected urine and an elevated white count.  ED started broad-spectrum IV antibiotics.      Overview/Hospital Course:  Selma Lux is a 84 y.o. 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  She has had encephalopathy during her hospital stay and admission to rehab. She tested positive for COVID on 1/23.  Paramedics state that she has been having very poor oral intake. Patient was admitted to ICU for hypotension and respiratory distress, anticipating rapid deterioration 2/2 her medical history. Patient is on 4L NC, SpO2 stable, she had episode of hypotensions but not sustained, no pressor indicated. Mentation improved in the morning. Patient required sedation for procedure. Family visit seemed to calm her down. Patient no longer required Precedex gtt. Qtc prolongation resolved, likely 2/2 to chronic plaquenil. Patient sat well on room air. Patient ready to transfer to lower level of care unit. ID consulted. Vanc and rocephin Dc'howard 01/28. Transitioned to amp- completing 14d course.       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  Productive cough. No fevers in past 24hrs. Spoke w/daughter today, Susu- she reports that she feels that her strength has improved since last visit. No nausea, vomiting, chills, dysuria, hematuria, abd pain, constipation.    Objective:     Vital Signs (Most Recent):  Temp: 98.8 °F (37.1 °C) (01/29/22 1145)  Pulse: 98 (01/29/22 1415)  Resp: (!) 22 (01/29/22 1350)  BP: (!) 119/48 (01/29/22 1145)  SpO2: 98 % (01/29/22 1350) Vital Signs (24h Range):  Temp:  [97.6 °F (36.4 °C)-98.8 °F (37.1 °C)] 98.8 °F (37.1 °C)  Pulse:  [] 98  Resp:  [20-26] 22  SpO2:  [97 %-100 %] 98 %  BP: (101-124)/(48-80) 119/48     Weight: 68.5 kg (151 lb)  Body mass index is 25.92 kg/m².    Intake/Output Summary (Last 24 hours) at 1/29/2022 1552  Last data filed at 1/29/2022 1200  Gross per 24 hour   Intake 260.22 ml   Output --   Net 260.22 ml     Physical Exam  Gen: in NAD, appears stated age, appears fatigued  Neuro: AAOx3, motor, sensory, and strength grossly intact BL  HEENT: NTNC, EOMI, PERRL, MMM  CVS: RRR, no m/r/g; S1/S2 auscultated with no S3 or S4; capillary refill < 2 sec  Resp:productive cough, transmitted upper respiratory sounds, no wheezes or focal crackles, no belabored breathing or accessory muscle use appreciated   Abd: BS+ in all 4 quadrants; NTND, soft to palpation; no organomegaly appreciated   Extrem: pulses full, equal, and regular over all 4 extremities; no UE or LE edema BL    Significant Labs:   All pertinent labs within the past 24 hours have been reviewed.  Blood Culture: No results for input(s): LABBLOO in the last 48 hours.  CBC:   Recent Labs   Lab 01/28/22  0231 01/29/22  0330   WBC 14.05* 18.76*   HGB 8.7* 8.1*   HCT 28.0* 26.4*    266     CMP:   Recent Labs   Lab 01/28/22  0231 01/29/22  0330    132*   K 3.7 3.7    103   CO2 22* 23   GLU 79 84   BUN 21 20   CREATININE 0.7 0.7   CALCIUM 7.4* 7.7*   PROT 4.6* 4.6*   ALBUMIN 2.0* 2.0*   BILITOT 0.7 0.7   ALKPHOS 88 85   AST 20 14   ALT 18  16   ANIONGAP 8 6*   EGFRNONAA >60.0 >60.0     Urine Culture: No results for input(s): LABURIN in the last 48 hours.    Significant Imaging: I have reviewed all pertinent imaging results/findings within the past 24 hours.     TTE 01/27:  · The left ventricle is normal in size with concentric remodeling and normal systolic function. The estimated ejection fraction is 55%.  · Normal right ventricular size with normal right ventricular systolic function.  · Normal left ventricular diastolic function.  · Mild mitral regurgitation.  · No evidence of intracardiac vegetation    CXR:  FINDINGS:  Cardiac silhouette is unchanged.  Similar prominence of the upper mediastinum in this patient known goiter.  Scattered interstitial and airspace opacity with slight clearing at the right upper lobe and left base.  No significant pleural effusion or gross pneumothorax.  Advanced glenohumeral DJD.     Impression:     As above.      Assessment/Plan:      * Acute hypoxemic respiratory failure  - Resolved- appeared to be 2/2 CHF, COPD and Interstitial lung disease + COVID-19 infection  - Stable on room air    Enterococcal bacteremia  - BCx 01/23- Enterococcus faecalis  - Repeat BCx 01/24- contaminant, otherwise NGTD  - Appreciate ID recs: 14d total course of antibiotics- EOT 02/07/22  - Continue amp 2g q4h  - Obtain weekly outpatient laboratory on Monday or Tuesday while on IV antibiotics: CBC, CMP      Fax laboratory results to [facility] at [phone] with attn: Dr. Porter     Outpatient Infectious Diseases clinic follow up will be arranged and found in patient calendar.     Prior to discharge, please ensure the patient's follow-up has been scheduled.  If there is still no follow-up scheduled in Infectious Diseases clinic, please send an EPIC message to Gisele Smith in Infectious Diseases.    Leukocytosis  - Uptrending WBC  - Increased cough on exam  - neutrophilic predominance  - Has been on dex since 01/23, but no previous  leukocytosis after 36hrs of use  - Repeat CXR unremarkable  - UA repeat  - Repeat BCx and Resp Cx for evaluation of additional infectious agents not currently covered w/current treatment regimen    UTI (urinary tract infection)  - UCx w/Ecoli 01/23- continue amp  - EOT 02/07/22    COVID-19 virus infection  - Anticoagulation: heparin  - Remdesivir x 5 days (1/23), holding plaquenil (QT prolongation and negative effect to remdesivir)   - Stop Barcitinib- no current oxygen use, holding Cellcept   - Dexamethasone 6mg x 10 days (1/23), holding prednisone   - on room air  - Continuous pulse ox  - Airborne + contact precautions    Immunocompromised state due to drug therapy  - Patient is on chronic prednisone for her , on Plaquenil and Cellcept for RA.   - Historically prescribe bactrim for PCP prophylaxis  - Holding prednisone as currently administering dex  - holding Plaquenil and cellcept as well    COPD exacerbation  - resolved  - continue duoneb q6h for sob/wheeze    Chronic systolic (congestive) heart failure  - TTE 02/27/20:  Grade 1 diastolic dysfunction, EF 55%  - No exacerbation at this time  - Stable, continue GDMT    Multinodular goiter  - Enlarged thyroid gland resulting in leftward deviation and mass effect upon the airway => anticipate difficult airway access   - Prior consultations with surgery, patient lost following up due to multiple recent medical events     AF (paroxysmal atrial fibrillation)  - Heparin drip at [facility]  - Recent head bleed 12/28  - CHADSVASC of 7-home use of Eliquis  - Will need to be transition back to Eliquis prior to DC     Oropharyngeal dysphagia  - Evaluated by SLP- diet recommendations- dental soft, think liquids  - Aspiration precautions: 1 bite/sip at a time, Assistance with meals, Avoid talking while eating, Eliminate distractions, Feed only when awake/alert, HOB to 90 degrees, Meds whole 1 at a time, Remain upright 30 minutes post meal and Small bites/sips      Chronic  renal failure syndrome, stage 3 (moderate)  - stable renal function   - strict I and O  - Increase PO lasix to 40mg qday    Long QT interval  - last ecg 01/25- QTc 464  - avoid QT prolonging agents  - likely 2/2 plaquenil use  - repeat ECG in am    Hypertension, essential  - BP currently well-controlled  - Holding home regimen of amlodipine   - Will continue to monitor and further titrate antihypertensives as clinically indicated     PUD (peptic ulcer disease)  - continue PPI    VTE Risk Mitigation (From admission, onward)         Ordered     heparin 25,000 units in dextrose 5% (100 units/ml) IV bolus from bag - ADDITIONAL PRN BOLUS - 60 units/kg  As needed (PRN)        Question:  Heparin Infusion Adjustment (DO NOT MODIFY ANSWER)  Answer:  [email or web address]\epic\Images\Pharmacy\HeparinInfusions\heparin LOW INTENSITY nomogram for [facility] [id number]    01/23/22 1741     heparin 25,000 units in dextrose 5% (100 units/ml) IV bolus from bag - ADDITIONAL PRN BOLUS - 30 units/kg  As needed (PRN)        Question:  Heparin Infusion Adjustment (DO NOT MODIFY ANSWER)  Answer:  [email or web address]\epic\Images\Pharmacy\HeparinInfusions\heparin LOW INTENSITY nomogram for [facility] [id number]    01/23/22 1741     heparin 25,000 units in dextrose 5% 250 mL (100 units/mL) infusion LOW INTENSITY nomogram - [facility]  Continuous        Question Answer Comment   Heparin Infusion Adjustment (DO NOT MODIFY ANSWER) [email or web address]\epic\Images\Pharmacy\HeparinInfusions\heparin LOW INTENSITY nomogram for [facility] [id number]    Begin at (in units/kg/hr) 12        01/23/22 1741     IP VTE HIGH RISK PATIENT  Once         01/23/22 1259     Place sequential compression device  Until discontinued         01/23/22 1259                Discharge Planning   LETICIA: 2/1/2022     Code Status: Full Code   Is the patient medically ready for discharge?:     Reason for patient still in hospital (select all that apply): Patient trending condition  Discharge Plan A: Rehab   Discharge Delays:  None known at this time          Jovana Sahni MD  Department of Hospital Medicine   [facility] - Telemetry Stepdown

## 2022-01-29 NOTE — NURSING
Morning bedside handoff report received.  She is awake and pleasant.  No sign of distress noted at this time.

## 2022-01-29 NOTE — ASSESSMENT & PLAN NOTE
- last ecg 01/25- QTc 464  - avoid QT prolonging agents  - likely 2/2 plaquenil use  - repeat ECG in am

## 2022-01-29 NOTE — ASSESSMENT & PLAN NOTE
86 y.o. female with PMH seropositive RA  with documented history of cryptogenic organizing pneumonia (on prednisone, MMF), paroxysmal Afib, history of CVA, goiter, recent hospitalization 12/13/21-1/4/22 for R SDH without neurosurgical intervention, complicated by PEA arrest on 12/21/21; admitted on 1/23/2022 from rehab facility with 1 week of SOB; found to have acute hypoxic respiratory failure due to COVID-19, encephalopathy, E faecalis bacteremia ( 1/4 rigoberto bottle on admit BCx; likely GI/ source causing transient bacteremia), and symptomatic E coli UTI.     Currently on vancomycin and ceftriaxone for E faecalis bacteremia and E coli UTI. Can transition to ampicillin to target both species.     Recommendations:  - Can change vancomycin and ceftriaxone to ampicillin 2g IV q4h for total of 14 days. End date 2/7/22. (Please see separate plan of care for OPAT details).   - Continue COVID-19 management per protocol (dexamethasone and baricitinib). Completed remdesivir.  - Agreed to hold of MMF in the setting of ongoing treatment for UTI/bacteremia and COVID-19. Defer to primary/rheum to determine timeframe to resume home RA immunosuppressants.

## 2022-01-29 NOTE — ASSESSMENT & PLAN NOTE
- BCx 01/23- Enterococcus faecalis  - Repeat BCx 01/24- contaminant, otherwise NGTD  - ID consulted  - recommending 14d total course of antibiotics- EOT 02/07/22  - Stop vanc/rocephin; begin amp 2g q4h  - Obtain weekly outpatient laboratory on Monday or Tuesday while on IV antibiotics: CBC, CMP      If vancomycin trough is not at target (15-20) prior to discharge, the please perform vancomycin trough before their fourth outpatient dose.     Fax laboratory results to [facility] at [phone] with attn: Dr. Porter     Outpatient Infectious Diseases clinic follow up will be arranged and found in patient calendar.     Prior to discharge, please ensure the patient's follow-up has been scheduled.  If there is still no follow-up scheduled in Infectious Diseases clinic, please send an EPIC message to Gisele Smith in Infectious Diseases.

## 2022-01-29 NOTE — SUBJECTIVE & OBJECTIVE
Past Medical History:   Diagnosis Date    Acute cystitis without hematuria 2/27/2020    Acute hypoxemic respiratory failure 4/11/2018    Acute respiratory failure with hypoxia 3/2/2020    Altered mental status     Anemia     Arthritis     Bilateral hand pain 3/14/2018    Branch retinal vein occlusion, left eye 2/20/2015    Chronic bilateral low back pain without sciatica 3/23/2017    Chronic renal failure in pediatric patient, stage 3 (moderate) 4/15/2018    Cognitive communication deficit 12/19/2017    Cystoid macular edema, left eye 2/20/2015    Cystoid macular edema, left eye 2/20/2015    DJD (degenerative joint disease) of cervical spine 5/15/2013    Fatty liver 8/26/2014    Goiter 4/9/2018    Hashimoto's disease     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    Hypertension     Hypertensive encephalopathy     IBS (irritable bowel syndrome) 6/21/2017    IGT (impaired glucose tolerance) 1/12/2016    Iron deficiency anemia secondary to inadequate dietary iron intake 6/24/2013    Joint pain     Keratoconjunctivitis sicca of both eyes not specified as Sjogren's 7/29/2016    Leiomyoma of uterus, unspecified 9/16/2014    Long QT interval 6/29/2016    Due to medication (plaquenil)     Macular edema 1/12/2015    Multinodular goiter 1/12/2016    Neuropathy 8/23/2017    Plaquenil causing adverse effect in therapeutic use 10/7/2016    Pneumococcal vaccine refused 8/17/2016    Pneumonia due to Streptococcus pneumoniae 4/5/2018    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/2015    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    s/p Right Total knee replacement 5/15/2013    Scoliosis of thoracic spine 5/15/2013    Small vessel disease, cerebrovascular 12/28/2017    Stroke     UTI (urinary tract infection) 12/29/2018    Vascular dementia 12/6/2017       Past Surgical History:   Procedure Laterality Date    BREAST CYST EXCISION      CATARACT EXTRACTION      COLONOSCOPY N/A 9/29/2015     Procedure: COLONOSCOPY;  Surgeon: FIDELINA Sanchez MD;  Location: [facility] ENDO (4TH FLR);  Service: Endoscopy;  Laterality: N/A;    EYE SURGERY      INJECTION OF ANESTHETIC AGENT AROUND NERVE Left 4/19/2021    Procedure: BLOCK, NERVE, FEMORAL AND OBTURATOR;  Surgeon: Shivam Gonzalez MD;  Location: [facility] PAIN MGT;  Service: Pain Management;  Laterality: Left;  consent needed    JOINT REPLACEMENT      right knee    KNEE SURGERY Left 12/31/2013    TKR    left parotidectomy      mixed tumor    DC EVAL,SWALLOW FUNCTION,CINE/VIDEO RECORD  6/5/2021         SALIVARY GLAND SURGERY      TONSILLECTOMY      UPPER GASTROINTESTINAL ENDOSCOPY         Review of patient's allergies indicates:  No Known Allergies    Medications:  Facility-Administered Medications Prior to Admission   Medication    onabotulinumtoxina injection 200 Units     Medications Prior to Admission   Medication Sig    albuterol (PROVENTIL/VENTOLIN HFA) 90 mcg/actuation inhaler Inhale 2 puffs into the lungs every 6 (six) hours as needed for Wheezing. Rescue    amLODIPine (NORVASC) 10 MG tablet Take 1 tablet (10 mg total) by mouth once daily.    apixaban (ELIQUIS) 5 mg Tab Take 1 tablet (5 mg total) by mouth 2 (two) times daily.    atorvastatin (LIPITOR) 40 MG tablet Take 1 tablet (40 mg total) by mouth once daily.    baclofen (LIORESAL) 10 MG tablet Take 0.5-1 tablets (5-10 mg total) by mouth 3 (three) times daily as needed.    ciclopirox (PENLAC) 8 % Soln Apply to affected nails qhs. Clean nails with alcohol q7 days.    furosemide (LASIX) 40 MG tablet Take 1 tablet (40 mg total) by mouth once daily. TAKE 1 TABLET(40 MG) BY MOUTH DAILY    gabapentin (NEURONTIN) 100 MG capsule Take 1-3 capsules (100-300 mg total) by mouth 2 (two) times daily.    gabapentin (NEURONTIN) 300 MG capsule Take 1 capsule (300 mg total) by mouth every evening.    hydrOXYchloroQUINE (PLAQUENIL) 200 mg tablet Take 1 tablet (200 mg total) by mouth 2 (two) times daily.     LIDOcaine (LIDODERM) 5 % Place 1 patch onto the skin once daily. Remove & Discard patch within 12 hours or as directed by MD    magnesium oxide (MAG-OX) 400 mg (241.3 mg magnesium) tablet Take 400 mg by mouth once daily.    melatonin (MELATIN) 3 mg tablet Take 2 tablets (6 mg total) by mouth nightly as needed for Insomnia.    montelukast (SINGULAIR) 10 mg tablet Take 1 tablet (10 mg total) by mouth every evening.    mycophenolate (CELLCEPT) 500 mg Tab Take 1 tablet (500 mg total) by mouth 2 (two) times daily.    omeprazole (PRILOSEC) 20 MG capsule Take 1 capsule (20 mg total) by mouth once daily.    phenazopyridine (PYRIDIUM) 100 MG tablet     polyethylene glycol (GLYCOLAX) 17 gram PwPk Take 17 g by mouth daily as needed.    predniSONE (DELTASONE) 5 MG tablet Take 1 tablet (5 mg total) by mouth once daily.    sulfamethoxazole-trimethoprim 800-160mg (BACTRIM DS) 800-160 mg Tab     sumatriptan (IMITREX) 50 MG tablet Take 1 tablet (50 mg total) by mouth every 6 (six) hours as needed for Migraine.    thiamine 100 MG tablet Take 100 mg by mouth once daily.    traMADoL (ULTRAM) 50 mg tablet Take 1 tablet (50 mg total) by mouth every 6 (six) hours as needed for Pain.     Antibiotics (From admission, onward)            Start     Stop Route Frequency Ordered    01/28/22 0900  cefTRIAXone (ROCEPHIN) 1 g/50 mL D5W IVPB         01/30 0859 IV Every 24 hours (non-standard times) 01/28/22 0150    01/26/22 1800  vancomycin in dextrose 5 % 1 gram/250 mL IVPB 1,000 mg         -- IV Every 24 hours (non-standard times) 01/26/22 0850        Antifungals (From admission, onward)            None        Antivirals (From admission, onward)    None           Immunization History   Administered Date(s) Administered    COVID-19, MRNA, LN-S, PF (Pfizer) (Purple Cap) 01/09/2021, 01/30/2021, 09/10/2021    Influenza - High Dose - PF (65 years and older) 03/07/2018    PPD Test 05/02/2018, 09/03/2019, 02/29/2020    Pneumococcal  Conjugate - 13 Valent 03/07/2018    Tdap 03/07/2018       Family History     Problem Relation (Age of Onset)    Breast cancer Maternal Grandmother    Cancer Father    Heart disease Mother    Hypertension Mother    Hyperthyroidism Mother, Other    Prostate cancer Father        Social History     Socioeconomic History    Marital status:    Tobacco Use    Smoking status: Never Smoker    Smokeless tobacco: Never Used   Substance and Sexual Activity    Alcohol use: Yes     Alcohol/week: 0.0 standard drinks     Comment: very seldom     Drug use: No    Sexual activity: Not Currently     Comment: ,  age 52,    Other Topics Concern    Are you pregnant or think you may be? No    Breast-feeding No     Social Determinants of Health     Financial Resource Strain: Low Risk     Difficulty of Paying Living Expenses: Not hard at all   Food Insecurity: No Food Insecurity    Worried About Running Out of Food in the Last Year: Never true    Ran Out of Food in the Last Year: Never true   Transportation Needs: No Transportation Needs    Lack of Transportation (Medical): No    Lack of Transportation (Non-Medical): No   Physical Activity: Insufficiently Active    Days of Exercise per Week: 2 days    Minutes of Exercise per Session: 60 min   Stress: No Stress Concern Present    Feeling of Stress : Only a little   Social Connections: Unknown    Frequency of Communication with Friends and Family: More than three times a week    Frequency of Social Gatherings with Friends and Family: More than three times a week    Active Member of Clubs or Organizations: Yes    Attends Club or Organization Meetings: More than 4 times per year    Marital Status:      Review of Systems   Constitutional: Negative for chills and fever.   HENT: Negative for congestion and sore throat.    Eyes: Negative for redness and visual disturbance.   Respiratory: Positive for cough and shortness of breath.    Cardiovascular:  Negative for chest pain and leg swelling.   Gastrointestinal: Negative for abdominal distention, abdominal pain, nausea and vomiting.   Endocrine: Negative for polyuria.   Genitourinary: Negative for dysuria.   Musculoskeletal: Positive for arthralgias (chronic).   Skin: Positive for wound (sacral). Negative for rash.   Allergic/Immunologic: Negative for food allergies.   Neurological: Positive for weakness. Negative for headaches.   Hematological: Negative for adenopathy. Does not bruise/bleed easily.   Psychiatric/Behavioral: Negative for confusion.     Objective:     Vital Signs (Most Recent):  Temp: 98.1 °F (36.7 °C) (01/28/22 1500)  Pulse: 92 (01/28/22 1500)  Resp: 18 (01/28/22 1245)  BP: 110/63 (01/28/22 1500)  SpO2: 100 % (01/28/22 1500) Vital Signs (24h Range):  Temp:  [97 °F (36.1 °C)-98.6 °F (37 °C)] 98.1 °F (36.7 °C)  Pulse:  [] 92  Resp:  [16-30] 18  SpO2:  [94 %-100 %] 100 %  BP: (101-136)/(59-87) 110/63     Weight: 68.5 kg (151 lb)  Body mass index is 25.92 kg/m².    Estimated Creatinine Clearance: 56.9 mL/min (based on SCr of 0.7 mg/dL).    Physical Exam  Vitals and nursing note reviewed.   Constitutional:       General: She is not in acute distress.  HENT:      Head: Normocephalic and atraumatic.      Right Ear: External ear normal.      Left Ear: External ear normal.      Nose: Nose normal.      Mouth/Throat:      Mouth: Mucous membranes are moist.   Eyes:      General: No scleral icterus.     Extraocular Movements: Extraocular movements intact.      Conjunctiva/sclera: Conjunctivae normal.   Cardiovascular:      Rate and Rhythm: Normal rate and regular rhythm.      Heart sounds: No murmur heard.      Pulmonary:      Effort: Pulmonary effort is normal. No respiratory distress.      Breath sounds: Normal breath sounds. No rhonchi.   Abdominal:      General: Abdomen is flat. Bowel sounds are normal.      Palpations: Abdomen is soft.      Tenderness: There is no abdominal tenderness.    Musculoskeletal:      Cervical back: Normal range of motion and neck supple.      Right lower leg: No edema.      Left lower leg: No edema.   Skin:     General: Skin is warm.      Capillary Refill: Capillary refill takes less than 2 seconds.      Findings: No rash.   Neurological:      Mental Status: She is alert, oriented to person, place, and time and easily aroused.   Psychiatric:         Mood and Affect: Mood normal.         Behavior: Behavior normal.       Significant Labs:   Blood Culture:   Recent Labs   Lab 01/23/22  1023 01/23/22  1024 01/24/22  1616   LABBLOO Gram stain rigoberto bottle: Gram positive cocci in chains resembling Strep   Results called to and read back by: Eliana Harris RN 01/24/2022    02:02  ENTEROCOCCUS FAECALIS* No growth after 5 days. Gram stain aer bottle: Gram positive cocci in clusters resembling Staph   Results called to and read back by: Kelsey Saucedo RN 01/26/2022     11:50  COAGULASE-NEGATIVE STAPHYLOCOCCUS SPECIES  Organism is a probable contaminant  *     CBC:   Recent Labs   Lab 01/28/22  0231   WBC 14.05*   HGB 8.7*   HCT 28.0*        CMP:   Recent Labs   Lab 01/28/22  0231      K 3.7      CO2 22*   GLU 79   BUN 21   CREATININE 0.7   CALCIUM 7.4*   PROT 4.6*   ALBUMIN 2.0*   BILITOT 0.7   ALKPHOS 88   AST 20   ALT 18   ANIONGAP 8   EGFRNONAA >60.0     Urine Culture:   Recent Labs   Lab 01/23/22  1053   LABURIN ESCHERICHIA COLI  >100,000 cfu/ml  *     All pertinent labs within the past 24 hours have been reviewed.    Significant Imaging: I have reviewed all pertinent imaging results/findings within the past 24 hours.

## 2022-01-29 NOTE — PROGRESS NOTES
SBAR hand off taken from Quinn SANDERS. Pt is awake and alert in bed, AOX4 able to make needs known. Family member at bedside. Pt denies having any pain at this time. Vancomycin infusing @ 167 ml/hr, Heparin infusing at 7.2ml/hr no bleeding noted. Safety measures in place, handheld and belongings are within reach. VSS ( See Epic chart) NAD noted. Will continue to monitor.

## 2022-01-30 PROBLEM — B37.0 THRUSH: Status: ACTIVE | Noted: 2022-01-30

## 2022-01-30 LAB
ALBUMIN SERPL BCP-MCNC: 2.1 G/DL (ref 3.5–5.2)
ALP SERPL-CCNC: 93 U/L (ref 55–135)
ALT SERPL W/O P-5'-P-CCNC: 18 U/L (ref 10–44)
ANION GAP SERPL CALC-SCNC: 9 MMOL/L (ref 8–16)
ANISOCYTOSIS BLD QL SMEAR: SLIGHT
APTT BLDCRRT: 37.3 SEC (ref 21–32)
APTT BLDCRRT: 54.7 SEC (ref 21–32)
AST SERPL-CCNC: 13 U/L (ref 10–40)
BASOPHILS # BLD AUTO: ABNORMAL K/UL (ref 0–0.2)
BASOPHILS NFR BLD: 0 % (ref 0–1.9)
BILIRUB SERPL-MCNC: 0.7 MG/DL (ref 0.1–1)
BUN SERPL-MCNC: 18 MG/DL (ref 8–23)
BURR CELLS BLD QL SMEAR: ABNORMAL
CALCIUM SERPL-MCNC: 7.7 MG/DL (ref 8.7–10.5)
CHLORIDE SERPL-SCNC: 103 MMOL/L (ref 95–110)
CO2 SERPL-SCNC: 22 MMOL/L (ref 23–29)
CREAT SERPL-MCNC: 0.7 MG/DL (ref 0.5–1.4)
DIFFERENTIAL METHOD: ABNORMAL
EOSINOPHIL # BLD AUTO: ABNORMAL K/UL (ref 0–0.5)
EOSINOPHIL NFR BLD: 0 % (ref 0–8)
ERYTHROCYTE [DISTWIDTH] IN BLOOD BY AUTOMATED COUNT: 19.2 % (ref 11.5–14.5)
EST. GFR  (AFRICAN AMERICAN): >60 ML/MIN/1.73 M^2
EST. GFR  (NON AFRICAN AMERICAN): >60 ML/MIN/1.73 M^2
GLUCOSE SERPL-MCNC: 135 MG/DL (ref 70–110)
HCT VFR BLD AUTO: 26 % (ref 37–48.5)
HGB BLD-MCNC: 8.1 G/DL (ref 12–16)
HYPOCHROMIA BLD QL SMEAR: ABNORMAL
IMM GRANULOCYTES # BLD AUTO: ABNORMAL K/UL (ref 0–0.04)
IMM GRANULOCYTES NFR BLD AUTO: ABNORMAL % (ref 0–0.5)
LYMPHOCYTES # BLD AUTO: ABNORMAL K/UL (ref 1–4.8)
LYMPHOCYTES NFR BLD: 4 % (ref 18–48)
MAGNESIUM SERPL-MCNC: 1.8 MG/DL (ref 1.6–2.6)
MCH RBC QN AUTO: 28.2 PG (ref 27–31)
MCHC RBC AUTO-ENTMCNC: 31.2 G/DL (ref 32–36)
MCV RBC AUTO: 91 FL (ref 82–98)
MONOCYTES # BLD AUTO: ABNORMAL K/UL (ref 0.3–1)
MONOCYTES NFR BLD: 7 % (ref 4–15)
MYELOCYTES NFR BLD MANUAL: 1 %
NEUTROPHILS NFR BLD: 87 % (ref 38–73)
NEUTS BAND NFR BLD MANUAL: 1 %
NRBC BLD-RTO: 0 /100 WBC
OVALOCYTES BLD QL SMEAR: ABNORMAL
PHOSPHATE SERPL-MCNC: 1.8 MG/DL (ref 2.7–4.5)
PLATELET # BLD AUTO: 307 K/UL (ref 150–450)
PMV BLD AUTO: 11.6 FL (ref 9.2–12.9)
POIKILOCYTOSIS BLD QL SMEAR: SLIGHT
POLYCHROMASIA BLD QL SMEAR: ABNORMAL
POTASSIUM SERPL-SCNC: 3.8 MMOL/L (ref 3.5–5.1)
PROT SERPL-MCNC: 4.8 G/DL (ref 6–8.4)
RBC # BLD AUTO: 2.87 M/UL (ref 4–5.4)
SCHISTOCYTES BLD QL SMEAR: ABNORMAL
SODIUM SERPL-SCNC: 134 MMOL/L (ref 136–145)
SPHEROCYTES BLD QL SMEAR: ABNORMAL
WBC # BLD AUTO: 22.62 K/UL (ref 3.9–12.7)

## 2022-01-30 PROCEDURE — 27000207 HC ISOLATION

## 2022-01-30 PROCEDURE — 93010 EKG 12-LEAD: ICD-10-PCS | Mod: ,,, | Performed by: INTERNAL MEDICINE

## 2022-01-30 PROCEDURE — 93010 ELECTROCARDIOGRAM REPORT: CPT | Mod: ,,, | Performed by: INTERNAL MEDICINE

## 2022-01-30 PROCEDURE — 20600001 HC STEP DOWN PRIVATE ROOM

## 2022-01-30 PROCEDURE — 25000003 PHARM REV CODE 250: Performed by: HOSPITALIST

## 2022-01-30 PROCEDURE — 94640 AIRWAY INHALATION TREATMENT: CPT

## 2022-01-30 PROCEDURE — 63600175 PHARM REV CODE 636 W HCPCS: Performed by: STUDENT IN AN ORGANIZED HEALTH CARE EDUCATION/TRAINING PROGRAM

## 2022-01-30 PROCEDURE — 80053 COMPREHEN METABOLIC PANEL: CPT | Performed by: EMERGENCY MEDICINE

## 2022-01-30 PROCEDURE — 25000242 PHARM REV CODE 250 ALT 637 W/ HCPCS: Performed by: STUDENT IN AN ORGANIZED HEALTH CARE EDUCATION/TRAINING PROGRAM

## 2022-01-30 PROCEDURE — 36415 COLL VENOUS BLD VENIPUNCTURE: CPT | Performed by: STUDENT IN AN ORGANIZED HEALTH CARE EDUCATION/TRAINING PROGRAM

## 2022-01-30 PROCEDURE — 99233 SBSQ HOSP IP/OBS HIGH 50: CPT | Mod: CR,,, | Performed by: STUDENT IN AN ORGANIZED HEALTH CARE EDUCATION/TRAINING PROGRAM

## 2022-01-30 PROCEDURE — 83735 ASSAY OF MAGNESIUM: CPT | Performed by: EMERGENCY MEDICINE

## 2022-01-30 PROCEDURE — 99233 PR SUBSEQUENT HOSPITAL CARE,LEVL III: ICD-10-PCS | Mod: CR,,, | Performed by: STUDENT IN AN ORGANIZED HEALTH CARE EDUCATION/TRAINING PROGRAM

## 2022-01-30 PROCEDURE — 85007 BL SMEAR W/DIFF WBC COUNT: CPT

## 2022-01-30 PROCEDURE — 25000003 PHARM REV CODE 250: Performed by: STUDENT IN AN ORGANIZED HEALTH CARE EDUCATION/TRAINING PROGRAM

## 2022-01-30 PROCEDURE — 36415 COLL VENOUS BLD VENIPUNCTURE: CPT

## 2022-01-30 PROCEDURE — 84100 ASSAY OF PHOSPHORUS: CPT | Performed by: EMERGENCY MEDICINE

## 2022-01-30 PROCEDURE — 63600175 PHARM REV CODE 636 W HCPCS

## 2022-01-30 PROCEDURE — 94761 N-INVAS EAR/PLS OXIMETRY MLT: CPT

## 2022-01-30 PROCEDURE — 85730 THROMBOPLASTIN TIME PARTIAL: CPT | Mod: 91 | Performed by: STUDENT IN AN ORGANIZED HEALTH CARE EDUCATION/TRAINING PROGRAM

## 2022-01-30 PROCEDURE — 25000003 PHARM REV CODE 250: Performed by: EMERGENCY MEDICINE

## 2022-01-30 PROCEDURE — 93005 ELECTROCARDIOGRAM TRACING: CPT

## 2022-01-30 PROCEDURE — 99900035 HC TECH TIME PER 15 MIN (STAT)

## 2022-01-30 PROCEDURE — 97530 THERAPEUTIC ACTIVITIES: CPT

## 2022-01-30 PROCEDURE — 25000242 PHARM REV CODE 250 ALT 637 W/ HCPCS: Performed by: EMERGENCY MEDICINE

## 2022-01-30 PROCEDURE — 94799 UNLISTED PULMONARY SVC/PX: CPT

## 2022-01-30 PROCEDURE — 97110 THERAPEUTIC EXERCISES: CPT

## 2022-01-30 PROCEDURE — 85027 COMPLETE CBC AUTOMATED: CPT

## 2022-01-30 PROCEDURE — 85730 THROMBOPLASTIN TIME PARTIAL: CPT

## 2022-01-30 RX ORDER — IPRATROPIUM BROMIDE AND ALBUTEROL SULFATE 2.5; .5 MG/3ML; MG/3ML
3 SOLUTION RESPIRATORY (INHALATION)
Status: DISCONTINUED | OUTPATIENT
Start: 2022-01-30 | End: 2022-02-08 | Stop reason: HOSPADM

## 2022-01-30 RX ORDER — FLUCONAZOLE 100 MG/1
100 TABLET ORAL DAILY
Status: DISCONTINUED | OUTPATIENT
Start: 2022-01-31 | End: 2022-02-01

## 2022-01-30 RX ORDER — FLUCONAZOLE 200 MG/1
200 TABLET ORAL ONCE
Status: COMPLETED | OUTPATIENT
Start: 2022-01-30 | End: 2022-01-30

## 2022-01-30 RX ADMIN — PANTOPRAZOLE SODIUM 40 MG: 40 TABLET, DELAYED RELEASE ORAL at 09:01

## 2022-01-30 RX ADMIN — AMPICILLIN 2 G: 2 INJECTION, POWDER, FOR SOLUTION INTRAMUSCULAR; INTRAVENOUS at 03:01

## 2022-01-30 RX ADMIN — FLUCONAZOLE 200 MG: 200 TABLET ORAL at 02:01

## 2022-01-30 RX ADMIN — IPRATROPIUM BROMIDE AND ALBUTEROL SULFATE 3 ML: 2.5; .5 SOLUTION RESPIRATORY (INHALATION) at 02:01

## 2022-01-30 RX ADMIN — IPRATROPIUM BROMIDE AND ALBUTEROL SULFATE 3 ML: 2.5; .5 SOLUTION RESPIRATORY (INHALATION) at 08:01

## 2022-01-30 RX ADMIN — AMPICILLIN 2 G: 2 INJECTION, POWDER, FOR SOLUTION INTRAMUSCULAR; INTRAVENOUS at 07:01

## 2022-01-30 RX ADMIN — AMPICILLIN 2 G: 2 INJECTION, POWDER, FOR SOLUTION INTRAMUSCULAR; INTRAVENOUS at 10:01

## 2022-01-30 RX ADMIN — APIXABAN 5 MG: 5 TABLET, FILM COATED ORAL at 09:01

## 2022-01-30 RX ADMIN — FUROSEMIDE 40 MG: 40 TABLET ORAL at 09:01

## 2022-01-30 RX ADMIN — AMPICILLIN 2 G: 2 INJECTION, POWDER, FOR SOLUTION INTRAMUSCULAR; INTRAVENOUS at 06:01

## 2022-01-30 RX ADMIN — AMPICILLIN 2 G: 2 INJECTION, POWDER, FOR SOLUTION INTRAMUSCULAR; INTRAVENOUS at 02:01

## 2022-01-30 RX ADMIN — MELATONIN TAB 3 MG 6 MG: 3 TAB at 09:01

## 2022-01-30 RX ADMIN — ACETAMINOPHEN 650 MG: 325 TABLET ORAL at 09:01

## 2022-01-30 RX ADMIN — DEXAMETHASONE SODIUM PHOSPHATE 6 MG: 4 INJECTION INTRA-ARTICULAR; INTRALESIONAL; INTRAMUSCULAR; INTRAVENOUS; SOFT TISSUE at 09:01

## 2022-01-30 RX ADMIN — AMPICILLIN 2 G: 2 INJECTION, POWDER, FOR SOLUTION INTRAMUSCULAR; INTRAVENOUS at 09:01

## 2022-01-30 NOTE — PROGRESS NOTES
Amount reflects carry over volume from unknown amount/time/days, verified with MARILYN Abrams during shift handoff.

## 2022-01-30 NOTE — PLAN OF CARE
Problem: Infection  Goal: Absence of Infection Signs and Symptoms  Outcome: Ongoing, Progressing     Problem: Adult Inpatient Plan of Care  Goal: Plan of Care Review  Outcome: Ongoing, Progressing  Goal: Patient-Specific Goal (Individualized)  Outcome: Ongoing, Progressing  Goal: Absence of Hospital-Acquired Illness or Injury  Outcome: Ongoing, Progressing  Goal: Optimal Comfort and Wellbeing  Outcome: Ongoing, Progressing  Goal: Readiness for Transition of Care  Outcome: Ongoing, Progressing     Problem: Impaired Wound Healing  Goal: Optimal Wound Healing  Outcome: Ongoing, Progressing     Problem: Fall Injury Risk  Goal: Absence of Fall and Fall-Related Injury  Outcome: Ongoing, Progressing     Problem: Skin Injury Risk Increased  Goal: Skin Health and Integrity  Outcome: Ongoing, Progressing

## 2022-01-30 NOTE — PLAN OF CARE
Problem: Infection  Goal: Absence of Infection Signs and Symptoms  Outcome: Ongoing, Progressing     Problem: Adult Inpatient Plan of Care  Goal: Plan of Care Review  Outcome: Ongoing, Progressing  Goal: Patient-Specific Goal (Individualized)  Outcome: Ongoing, Progressing  Goal: Absence of Hospital-Acquired Illness or Injury  Outcome: Ongoing, Progressing  Goal: Optimal Comfort and Wellbeing  Outcome: Ongoing, Progressing  Goal: Readiness for Transition of Care  Outcome: Ongoing, Progressing     Problem: Impaired Wound Healing  Goal: Optimal Wound Healing  Outcome: Ongoing, Progressing     Problem: Fall Injury Risk  Goal: Absence of Fall and Fall-Related Injury  Outcome: Ongoing, Progressing     Problem: Skin Injury Risk Increased  Goal: Skin Health and Integrity  Outcome: Ongoing, Progressing   She had a great day.  She waited a long time to void but after scanning her bladder and noted approx 375 ml urine we repositioned her and ran water and she finally voided to the purewick and a urine spec was collected.  Also today a sputum spec is collected.  She was not able to tolerate standing even with max assist as she remains to weak at this time.  She did sit on the bedside side for a few minutes but was ready to go to bed quickly.  No sign of distress is noted at this time.

## 2022-01-30 NOTE — PT/OT/SLP PROGRESS
"Occupational Therapy   Treatment    Name: Selma Lux  MRN: [id number]  Admitting Diagnosis:  Acute hypoxemic respiratory failure       Recommendations:     Discharge Recommendations: rehabilitation facility      Assessment:     Selma Lux is a 86 y.o. female with a medical diagnosis of Acute hypoxemic respiratory failure. Performance deficits affecting function are weakness,impaired endurance,impaired self care skills,impaired functional mobilty,gait instability,impaired balance,decreased upper extremity function,decreased lower extremity function,decreased safety awareness.   Pt tolerated session well with good effort and performance. Anticipate pt will continue to benefit from post acute therapy prior to return home.   Rehab Prognosis:  Good; patient would benefit from acute skilled OT services to address these deficits and reach maximum level of function.       Plan:     Patient to be seen 3 x/week to address the above listed problems via self-care/home management,therapeutic activities,therapeutic exercises  · Plan of Care Expires: 02/22/22  · Plan of Care Reviewed with: patient    Subjective   Pt agreeable to therapy. Pt stating, "I would like that".   Pt also stated, "I'm disappointed and I would like to lay down" after standing trial.     Pain/Comfort:  · Pain Rating 1: 0/10    Objective:     Communicated with: nsg prior to session.  Patient found  With PICC, tele, pulse ox and BP cuff and shoulder IV. Bed alarm intact.   Saturation remained % with activity with minimal SOB noted.     General Precautions: Standard, fall,droplet,contact,airborne     Occupational Performance:     Bed Mobility:    Supine>sit with VARUN    Sit>supine with MOD A   Drawsheet to HOB with MAX A x 2     Functional Mobility/Transfers:  2 standing trials, First trial was not successful as pt wanting to attempt standing without assist.  Second trial, pt required MAX A x 2 for full upright stand     Activities of " Daily Living:  · Feeding; Set-up  · G/H pt declined but simulated with set-up  · LE dressing: TOTAL A       AMPA 6 Click ADL: 10    Treatment & Education:  Pt awake, alert and following commands. Pt tolerated sitting EOB nearly 15 min with SBA for postural control. Pt engaged with seated LE AROM exs 2 planes x 10 reps. Pt completed B UE AAROM exs with slow prolonged passive shoulder flexion stretch with limited ROM from PTA. Shoulder flexion AROM grossly 35 degrees.     2 standing trials completed with final trial requiring MAX A for full upright standing. Pt reported she was disappointed with her performance and requested to return supine.   Education provided re: OT POC and safety with functional mobility/ADL skills. Encouragement and education provided re: current functional progress.     Patient left supine with all lines intact, call button in reach and nsg notifiedEducation:    Bed alarm replaced.   GOALS:   Multidisciplinary Problems     Occupational Therapy Goals        Problem: Occupational Therapy Goal    Goal Priority Disciplines Outcome Interventions   Occupational Therapy Goal     OT, PT/OT Ongoing, Progressing    Description: Goals to be met by: 2/7/2022    Patient will increase functional independence with ADLs by performing:    UE Dressing with Minimal Assistance.  Grooming while EOB with Moderate Assistance.  Toileting from bedside commode with Moderate Assistance for hygiene and clothing management.   Sitting at edge of bed x10 minutes with Moderate Assistance.  Toilet transfer to bedside commode with Moderate Assistance.                     Time Tracking:     OT Date of Treatment: 01/30/22  OT Start Time: 1006  OT Stop Time: 1034  OT Total Time (min): 28 min    Billable Minutes:Therapeutic Activity 15  Therapeutic Exercise 13    OT/GENE: OT          1/30/2022

## 2022-01-30 NOTE — SUBJECTIVE & OBJECTIVE
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 Improvement in PO intake and mentation. Still w/up-trending WBC. No nausea, vomiting, chills, dysuria, hematuria, abd pain, constipation.    Objective:     Vital Signs (Most Recent):  Temp: 98 °F (36.7 °C) (01/30/22 1137)  Pulse: 98 (01/30/22 1137)  Resp: (!) 22 (01/30/22 1137)  BP: 135/65 (01/30/22 1137)  SpO2: 100 % (01/30/22 1137) Vital Signs (24h Range):  Temp:  [97.5 °F (36.4 °C)-98.4 °F (36.9 °C)] 98 °F (36.7 °C)  Pulse:  [] 98  Resp:  [14-28] 22  SpO2:  [96 %-100 %] 100 %  BP: (104-138)/(49-78) 135/65     Weight: 68.5 kg (151 lb)  Body mass index is 25.92 kg/m².    Intake/Output Summary (Last 24 hours) at 1/30/2022 1418  Last data filed at 1/30/2022 0719  Gross per 24 hour   Intake 1200.4 ml   Output 600 ml   Net 600.4 ml     Physical Exam  Gen: in NAD, appears stated age, appears fatigued  Neuro: AAOx3, motor, sensory, and strength grossly intact BL  HEENT: NTNC, EOMI, PERRL, MMM- white plaques w/in oropharynx  CVS: RRR, no m/r/g; S1/S2 auscultated with no S3 or S4; capillary refill < 2 sec  Resp:productive cough, transmitted upper respiratory sounds, no wheezes or focal crackles, no belabored breathing or accessory muscle use appreciated   Abd: BS+ in all 4 quadrants; NTND, soft to palpation; no organomegaly appreciated  : stage 2 decubitus ulcer BL buttock  Extrem: pulses full, equal, and regular over all 4 extremities; no UE or LE edema BL    Significant Labs:   All pertinent labs within the past 24 hours have been reviewed.  Blood Culture:   Recent Labs   Lab 01/29/22  1332 01/29/22  1343   LABBLOO No Growth to date No Growth to date     CBC:   Recent Labs   Lab 01/29/22 0330 01/30/22 0318   WBC 18.76* 22.62*   HGB 8.1* 8.1*   HCT 26.4* 26.0*    307     CMP:   Recent Labs   Lab 01/29/22 0330 01/30/22 0318   * 134*   K 3.7 3.8    103   CO2 23 22*   GLU 84 135*   BUN 20 18   CREATININE 0.7 0.7   CALCIUM 7.7* 7.7*    PROT 4.6* 4.8*   ALBUMIN 2.0* 2.1*   BILITOT 0.7 0.7   ALKPHOS 85 93   AST 14 13   ALT 16 18   ANIONGAP 6* 9   EGFRNONAA >60.0 >60.0     Urine Culture: No results for input(s): LABURIN in the last 48 hours.    Significant Imaging: I have reviewed all pertinent imaging results/findings within the past 24 hours.     TTE 01/27:  · The left ventricle is normal in size with concentric remodeling and normal systolic function. The estimated ejection fraction is 55%.  · Normal right ventricular size with normal right ventricular systolic function.  · Normal left ventricular diastolic function.  · Mild mitral regurgitation.  · No evidence of intracardiac vegetation    CXR:  FINDINGS:  Cardiac silhouette is unchanged.  Similar prominence of the upper mediastinum in this patient known goiter.  Scattered interstitial and airspace opacity with slight clearing at the right upper lobe and left base.  No significant pleural effusion or gross pneumothorax.  Advanced glenohumeral DJD.     Impression:     As above.

## 2022-01-30 NOTE — NURSING
Morning bedside report complete.  She is awake and pleasant and reports feeling better today than yesterday.

## 2022-01-30 NOTE — PLAN OF CARE
Problem: Infection  Goal: Absence of Infection Signs and Symptoms  Outcome: Ongoing, Progressing     Problem: Adult Inpatient Plan of Care  Goal: Plan of Care Review  Outcome: Ongoing, Progressing  Goal: Patient-Specific Goal (Individualized)  Outcome: Ongoing, Progressing  Goal: Absence of Hospital-Acquired Illness or Injury  Outcome: Ongoing, Progressing  Goal: Optimal Comfort and Wellbeing  Outcome: Ongoing, Progressing  Goal: Readiness for Transition of Care  Outcome: Ongoing, Progressing     Problem: Impaired Wound Healing  Goal: Optimal Wound Healing  Outcome: Ongoing, Progressing     Problem: Fall Injury Risk  Goal: Absence of Fall and Fall-Related Injury  Outcome: Ongoing, Progressing     Problem: Skin Injury Risk Increased  Goal: Skin Health and Integrity  Outcome: Ongoing, Progressing  She has had a great day.  She continues to have O2 sats around 98% on room air.  The md did modify her poc with new evidence of oral yeast.  She is eating good and general affect is happy.  OT worked with her today and was able to assist her sitting at the bedside.  She continues forward progress on the present poc.

## 2022-01-31 ENCOUNTER — EXTERNAL CHRONIC CARE MANAGEMENT (OUTPATIENT)
Dept: PRIMARY CARE CLINIC | Facility: CLINIC | Age: 87
DRG: 177 | End: 2022-01-31
Payer: MEDICARE

## 2022-01-31 LAB
ALBUMIN SERPL BCP-MCNC: 2.1 G/DL (ref 3.5–5.2)
ALP SERPL-CCNC: 87 U/L (ref 55–135)
ALT SERPL W/O P-5'-P-CCNC: 22 U/L (ref 10–44)
ANION GAP SERPL CALC-SCNC: 9 MMOL/L (ref 8–16)
ANISOCYTOSIS BLD QL SMEAR: SLIGHT
AST SERPL-CCNC: 21 U/L (ref 10–40)
BACTERIA SPEC AEROBE CULT: ABNORMAL
BACTERIA SPEC AEROBE CULT: ABNORMAL
BASOPHILS NFR BLD: 0 % (ref 0–1.9)
BILIRUB SERPL-MCNC: 0.6 MG/DL (ref 0.1–1)
BUN SERPL-MCNC: 19 MG/DL (ref 8–23)
CALCIUM SERPL-MCNC: 7.8 MG/DL (ref 8.7–10.5)
CHLORIDE SERPL-SCNC: 106 MMOL/L (ref 95–110)
CO2 SERPL-SCNC: 20 MMOL/L (ref 23–29)
CREAT SERPL-MCNC: 0.8 MG/DL (ref 0.5–1.4)
DIFFERENTIAL METHOD: ABNORMAL
DOHLE BOD BLD QL SMEAR: PRESENT
EOSINOPHIL NFR BLD: 0 % (ref 0–8)
ERYTHROCYTE [DISTWIDTH] IN BLOOD BY AUTOMATED COUNT: 19.9 % (ref 11.5–14.5)
EST. GFR  (AFRICAN AMERICAN): >60 ML/MIN/1.73 M^2
EST. GFR  (NON AFRICAN AMERICAN): >60 ML/MIN/1.73 M^2
GLUCOSE SERPL-MCNC: 201 MG/DL (ref 70–110)
GRAM STN SPEC: ABNORMAL
HCT VFR BLD AUTO: 26.4 % (ref 37–48.5)
HGB BLD-MCNC: 8.1 G/DL (ref 12–16)
HYPOCHROMIA BLD QL SMEAR: ABNORMAL
IMM GRANULOCYTES # BLD AUTO: ABNORMAL K/UL (ref 0–0.04)
IMM GRANULOCYTES NFR BLD AUTO: ABNORMAL % (ref 0–0.5)
LYMPHOCYTES NFR BLD: 2 % (ref 18–48)
MAGNESIUM SERPL-MCNC: 1.8 MG/DL (ref 1.6–2.6)
MCH RBC QN AUTO: 27.9 PG (ref 27–31)
MCHC RBC AUTO-ENTMCNC: 30.7 G/DL (ref 32–36)
MCV RBC AUTO: 91 FL (ref 82–98)
MONOCYTES NFR BLD: 9 % (ref 4–15)
MYELOCYTES NFR BLD MANUAL: 1 %
NEUTROPHILS NFR BLD: 88 % (ref 38–73)
NRBC BLD-RTO: 1 /100 WBC
OVALOCYTES BLD QL SMEAR: ABNORMAL
PHOSPHATE SERPL-MCNC: 2.2 MG/DL (ref 2.7–4.5)
PLATELET # BLD AUTO: 321 K/UL (ref 150–450)
PLATELET BLD QL SMEAR: ABNORMAL
PMV BLD AUTO: 11.5 FL (ref 9.2–12.9)
POIKILOCYTOSIS BLD QL SMEAR: SLIGHT
POLYCHROMASIA BLD QL SMEAR: ABNORMAL
POTASSIUM SERPL-SCNC: 4.3 MMOL/L (ref 3.5–5.1)
PROT SERPL-MCNC: 4.7 G/DL (ref 6–8.4)
RBC # BLD AUTO: 2.9 M/UL (ref 4–5.4)
SCHISTOCYTES BLD QL SMEAR: ABNORMAL
SCHISTOCYTES BLD QL SMEAR: ABNORMAL
SODIUM SERPL-SCNC: 135 MMOL/L (ref 136–145)
SPHEROCYTES BLD QL SMEAR: ABNORMAL
TOXIC GRANULES BLD QL SMEAR: PRESENT
WBC # BLD AUTO: 19.75 K/UL (ref 3.9–12.7)

## 2022-01-31 PROCEDURE — 20600001 HC STEP DOWN PRIVATE ROOM

## 2022-01-31 PROCEDURE — 25000242 PHARM REV CODE 250 ALT 637 W/ HCPCS: Performed by: STUDENT IN AN ORGANIZED HEALTH CARE EDUCATION/TRAINING PROGRAM

## 2022-01-31 PROCEDURE — 36415 COLL VENOUS BLD VENIPUNCTURE: CPT | Performed by: STUDENT IN AN ORGANIZED HEALTH CARE EDUCATION/TRAINING PROGRAM

## 2022-01-31 PROCEDURE — 94640 AIRWAY INHALATION TREATMENT: CPT

## 2022-01-31 PROCEDURE — 63600175 PHARM REV CODE 636 W HCPCS

## 2022-01-31 PROCEDURE — 99232 SBSQ HOSP IP/OBS MODERATE 35: CPT | Mod: CR,,, | Performed by: STUDENT IN AN ORGANIZED HEALTH CARE EDUCATION/TRAINING PROGRAM

## 2022-01-31 PROCEDURE — 83735 ASSAY OF MAGNESIUM: CPT | Performed by: EMERGENCY MEDICINE

## 2022-01-31 PROCEDURE — 25000003 PHARM REV CODE 250: Performed by: STUDENT IN AN ORGANIZED HEALTH CARE EDUCATION/TRAINING PROGRAM

## 2022-01-31 PROCEDURE — 85027 COMPLETE CBC AUTOMATED: CPT | Performed by: STUDENT IN AN ORGANIZED HEALTH CARE EDUCATION/TRAINING PROGRAM

## 2022-01-31 PROCEDURE — 99232 PR SUBSEQUENT HOSPITAL CARE,LEVL II: ICD-10-PCS | Mod: CR,,, | Performed by: STUDENT IN AN ORGANIZED HEALTH CARE EDUCATION/TRAINING PROGRAM

## 2022-01-31 PROCEDURE — 99490 CHRNC CARE MGMT STAFF 1ST 20: CPT | Mod: PBBFAC | Performed by: FAMILY MEDICINE

## 2022-01-31 PROCEDURE — 36415 COLL VENOUS BLD VENIPUNCTURE: CPT | Performed by: EMERGENCY MEDICINE

## 2022-01-31 PROCEDURE — 25000003 PHARM REV CODE 250: Performed by: HOSPITALIST

## 2022-01-31 PROCEDURE — 94761 N-INVAS EAR/PLS OXIMETRY MLT: CPT

## 2022-01-31 PROCEDURE — 97530 THERAPEUTIC ACTIVITIES: CPT

## 2022-01-31 PROCEDURE — 99490 CHRNC CARE MGMT STAFF 1ST 20: CPT | Mod: S$PBB,,, | Performed by: FAMILY MEDICINE

## 2022-01-31 PROCEDURE — 80053 COMPREHEN METABOLIC PANEL: CPT | Performed by: EMERGENCY MEDICINE

## 2022-01-31 PROCEDURE — 63700000 PHARM REV CODE 250 ALT 637 W/O HCPCS: Performed by: STUDENT IN AN ORGANIZED HEALTH CARE EDUCATION/TRAINING PROGRAM

## 2022-01-31 PROCEDURE — 63600175 PHARM REV CODE 636 W HCPCS: Performed by: STUDENT IN AN ORGANIZED HEALTH CARE EDUCATION/TRAINING PROGRAM

## 2022-01-31 PROCEDURE — 27000207 HC ISOLATION

## 2022-01-31 PROCEDURE — 94799 UNLISTED PULMONARY SVC/PX: CPT

## 2022-01-31 PROCEDURE — 84100 ASSAY OF PHOSPHORUS: CPT | Performed by: EMERGENCY MEDICINE

## 2022-01-31 PROCEDURE — 99490 PR CHRONIC CARE MGMT, 1ST 20 MIN: ICD-10-PCS | Mod: S$PBB,,, | Performed by: FAMILY MEDICINE

## 2022-01-31 PROCEDURE — 85007 BL SMEAR W/DIFF WBC COUNT: CPT | Performed by: STUDENT IN AN ORGANIZED HEALTH CARE EDUCATION/TRAINING PROGRAM

## 2022-01-31 PROCEDURE — 25000003 PHARM REV CODE 250: Performed by: EMERGENCY MEDICINE

## 2022-01-31 PROCEDURE — 99900035 HC TECH TIME PER 15 MIN (STAT)

## 2022-01-31 RX ORDER — MYCOPHENOLATE MOFETIL 500 MG/1
500 TABLET ORAL 2 TIMES DAILY
Qty: 180 TABLET | Refills: 0
Start: 2022-02-12 | End: 2022-02-03 | Stop reason: SDUPTHER

## 2022-01-31 RX ORDER — PREDNISONE 5 MG/1
5 TABLET ORAL DAILY
Qty: 30 TABLET | Refills: 2
Start: 2022-02-02 | End: 2022-02-02 | Stop reason: SDUPTHER

## 2022-01-31 RX ORDER — FLUCONAZOLE 100 MG/1
100 TABLET ORAL DAILY
Qty: 12 TABLET | Refills: 0
Start: 2022-02-01 | End: 2022-02-02

## 2022-01-31 RX ORDER — DEXAMETHASONE 6 MG/1
6 TABLET ORAL DAILY
Qty: 1 TABLET | Refills: 0
Start: 2022-01-31 | End: 2022-02-03 | Stop reason: HOSPADM

## 2022-01-31 RX ORDER — HYDROXYCHLOROQUINE SULFATE 200 MG/1
200 TABLET, FILM COATED ORAL 2 TIMES DAILY
Qty: 180 TABLET | Refills: 11
Start: 2022-02-12 | End: 2024-01-01

## 2022-01-31 RX ADMIN — DEXAMETHASONE SODIUM PHOSPHATE 6 MG: 4 INJECTION INTRA-ARTICULAR; INTRALESIONAL; INTRAMUSCULAR; INTRAVENOUS; SOFT TISSUE at 09:01

## 2022-01-31 RX ADMIN — AMPICILLIN 2 G: 2 INJECTION, POWDER, FOR SOLUTION INTRAMUSCULAR; INTRAVENOUS at 02:01

## 2022-01-31 RX ADMIN — APIXABAN 5 MG: 5 TABLET, FILM COATED ORAL at 09:01

## 2022-01-31 RX ADMIN — IPRATROPIUM BROMIDE AND ALBUTEROL SULFATE 3 ML: 2.5; .5 SOLUTION RESPIRATORY (INHALATION) at 09:01

## 2022-01-31 RX ADMIN — MELATONIN TAB 3 MG 6 MG: 3 TAB at 09:01

## 2022-01-31 RX ADMIN — AMPICILLIN 2 G: 2 INJECTION, POWDER, FOR SOLUTION INTRAMUSCULAR; INTRAVENOUS at 05:01

## 2022-01-31 RX ADMIN — AMPICILLIN 2 G: 2 INJECTION, POWDER, FOR SOLUTION INTRAMUSCULAR; INTRAVENOUS at 03:01

## 2022-01-31 RX ADMIN — LOPERAMIDE HYDROCHLORIDE 2 MG: 2 CAPSULE ORAL at 04:01

## 2022-01-31 RX ADMIN — FLUCONAZOLE 100 MG: 100 TABLET ORAL at 09:01

## 2022-01-31 RX ADMIN — AMPICILLIN 2 G: 2 INJECTION, POWDER, FOR SOLUTION INTRAMUSCULAR; INTRAVENOUS at 09:01

## 2022-01-31 RX ADMIN — ACETAMINOPHEN 650 MG: 325 TABLET ORAL at 09:01

## 2022-01-31 RX ADMIN — FUROSEMIDE 40 MG: 40 TABLET ORAL at 09:01

## 2022-01-31 RX ADMIN — PANTOPRAZOLE SODIUM 40 MG: 40 TABLET, DELAYED RELEASE ORAL at 09:01

## 2022-01-31 RX ADMIN — AMPICILLIN 2 G: 2 INJECTION, POWDER, FOR SOLUTION INTRAMUSCULAR; INTRAVENOUS at 10:01

## 2022-01-31 NOTE — ASSESSMENT & PLAN NOTE
- BCx 01/23- Enterococcus faecalis  - Repeat BCx 01/24- contaminant, otherwise NGTD  - Appreciate ID recs: 14d total course of antibiotics- EOT 02/07/22  - Continue amp 2g q4h  - Obtain weekly outpatient laboratory on Monday or Tuesday while on IV antibiotics: CBC, CMP      Fax laboratory results to [facility] at [phone] with attn: Dr. Porter     Outpatient Infectious Diseases clinic follow up will be arranged and found in patient calendar.     Prior to discharge, please ensure the patient's follow-up has been scheduled.  If there is still no follow-up scheduled in Infectious Diseases clinic, please send an EPIC message to Gisele Smith in Infectious Diseases.

## 2022-01-31 NOTE — ASSESSMENT & PLAN NOTE
- Spoke w/TAMELA- ok to resume oral anticoagulation- stop heparin drip, begin eliquis   - Recent head bleed 12/13  - CHADSVASC of 7

## 2022-01-31 NOTE — ASSESSMENT & PLAN NOTE
- Uptrending WBC- thrush on physical exam- likely culprit  - neutrophilic predominance  - beginning treatment for thrush w/fluconazole- immunocompromised state w/ chronic steroid use   - Has been on dex since 01/23, but no previous leukocytosis after 36hrs of use  - Repeat BCx and Resp Cx w/no growth

## 2022-01-31 NOTE — ASSESSMENT & PLAN NOTE
- Anticoagulation: heparin  - Remdesivir x 5 days (1/23), holding plaquenil (QT prolongation and negative effect to remdesivir)   - Holding cellcept  - Dexamethasone 6mg x 10 days (1/23), holding prednisone   - on room air  - Continuous pulse ox  - Airborne + contact precautions

## 2022-01-31 NOTE — PLAN OF CARE
Problem: Infection  Goal: Absence of Infection Signs and Symptoms  Outcome: Ongoing, Progressing     Problem: Adult Inpatient Plan of Care  Goal: Plan of Care Review  Outcome: Ongoing, Progressing  Goal: Optimal Comfort and Wellbeing  Outcome: Ongoing, Progressing     Problem: Impaired Wound Healing  Goal: Optimal Wound Healing  Outcome: Ongoing, Progressing     Problem: Skin Injury Risk Increased  Goal: Skin Health and Integrity  Outcome: Ongoing, Progressing       Awake, alert, oriented x 4 this shift. Denies pain other than with incontinence care.  Wound care provided as ordered.  Turned/repositioned every 2 hours. Tolerating diet.  Immodium given prn for multiple loose Bm's this shift. Will continue to monitor. Side rails up x 2, call light in reach, bed low and locked.

## 2022-01-31 NOTE — PLAN OF CARE
[facility] - Telemetry Stepdown  Discharge Reassessment    Primary Care Provider: Bhargav Hirsch MD    Expected Discharge Date: 2/1/2022    Reassessment (most recent)       Discharge Reassessment - 01/31/22 1535          Discharge Reassessment    Assessment Type Discharge Planning Reassessment (P)      Did the patient's condition or plan change since previous assessment? No (P)      Discharge Plan discussed with: Adult children (P)      Communicated LETICIA with patient/caregiver Yes (P)      Discharge Plan A Rehab (P)      Discharge Plan B Skilled Nursing Facility (P)      Why the patient remains in the hospital Requires continued medical care (P)         Post-Acute Status    Post-Acute Placement Status Referrals Sent (P)      Discharge Delays None known at this time (P)                    Advised Yonas with [facility] that pt daughter would like to speak with him regarding pt return to facility. Yonas noted he would speak with daughter and f/u with tx team.       Juana Vasques LMSW  Case Management Social Worker   [facility], [address]

## 2022-01-31 NOTE — ASSESSMENT & PLAN NOTE
- last ecg 01/25- QTc 464  - avoid QT prolonging agents if possible   - likely 2/2 plaquenil use  - repeat ECG in am as having to start fluconazole for thrush

## 2022-01-31 NOTE — PROGRESS NOTES
[facility] - Telemetry [facility] Medicine  Progress Note    Patient Name: Selma Lux  MRN: [id number]  Patient Class: IP- Inpatient   Admission Date: 1/23/2022  Length of Stay: 7 days  Attending Physician: Jovana Sahni MD  Primary Care Provider: Bhargav Hirsch MD        Subjective:     Principal Problem:Acute hypoxemic respiratory failure        HPI:  Selma Lux is a 84 y.o. 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  She has had encephalopathy during her hospital stay and admission to rehab. She tested positive for COVID yesterday.  Paramedics state that she has been having very poor oral intake.      Collateral information from her daughter.  She notes that she has had difficulty breathing for several months.  She states she has cryptogenic pneumonia and received steroids and breathing treatments for this. 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  She noted that she had been on the ventilator and really hated being on the ventilator.  However, she would want to be intubated if her life depended on it.  She would want to try everything possible to avoid intubation.  She noted that she tested positive for COVID yesterday along with her mother.  She states that she is not able to come see her mother due to coronavirus.  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     ED course: Patient with positive COVID test and respiratory distress. This seems to be a recurrent problem  with her history of cryptogenic organizing pneumonia. Prior chest x-rays seems to have had a similar appearance to the x-ray from today.  Diffuse infiltrates.  This resolved on recent chest x-rays from her admission.  Of note this recent admission is under a different registration under the same name in epic.   Her venous blood gas does not show respiratory failure or CO2 retention. She seems to have encephalopathy but this has been an ongoing issue. Her sodium today is normalized. Patient has markedly infected urine and an elevated white count.  ED started broad-spectrum IV antibiotics.      Overview/Hospital Course:  Selma Lux is a 84 y.o. 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  She has had encephalopathy during her hospital stay and admission to rehab. She tested positive for COVID on 1/23.  Paramedics state that she has been having very poor oral intake. Patient was admitted to ICU for hypotension and respiratory distress, anticipating rapid deterioration 2/2 her medical history. Patient is on 4L NC, SpO2 stable, she had episode of hypotensions but not sustained, no pressor indicated. Mentation improved in the morning. Patient required sedation for procedure. Family visit seemed to calm her down. Patient no longer required Precedex gtt. Qtc prolongation resolved, likely 2/2 to chronic plaquenil. Patient sat well on room air. Patient ready to transfer to lower level of care unit. ID consulted. Vanc and rocephin Dc'howard 01/28. Transitioned to amp- completing 14d course.       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  Improvement in PO intake and mentation. Still w/up-trending WBC. No nausea, vomiting, chills, dysuria, hematuria, abd pain, constipation.    Objective:     Vital Signs (Most Recent):  Temp: 98 °F (36.7 °C) (01/30/22 1137)  Pulse: 98 (01/30/22 1137)  Resp: (!) 22 (01/30/22 1137)  BP: 135/65 (01/30/22 1137)  SpO2: 100 % (01/30/22 1137) Vital Signs (24h Range):  Temp:  [97.5 °F (36.4 °C)-98.4 °F (36.9 °C)] 98 °F (36.7 °C)  Pulse:  [] 98  Resp:  [14-28] 22  SpO2:  [96 %-100 %] 100 %  BP: (104-138)/(49-78) 135/65     Weight: 68.5 kg (151 lb)  Body mass index is 25.92 kg/m².    Intake/Output Summary (Last 24 hours) at 1/30/2022 1418  Last data filed at 1/30/2022 0719  Gross per 24 hour   Intake 1200.4 ml   Output 600 ml   Net 600.4 ml     Physical Exam  Gen: in NAD, appears stated age, appears fatigued  Neuro: AAOx3, motor, sensory, and strength grossly intact BL  HEENT: NTNC, EOMI, PERRL, MMM- white plaques w/in oropharynx  CVS: RRR, no m/r/g; S1/S2 auscultated with no S3 or S4; capillary refill < 2 sec  Resp:productive cough, transmitted upper respiratory sounds, no wheezes or focal crackles, no belabored breathing or accessory muscle use appreciated   Abd: BS+ in all 4 quadrants; NTND, soft to palpation; no organomegaly appreciated  : stage 2 decubitus ulcer BL buttock  Extrem: pulses full, equal, and regular over all 4 extremities; no UE or LE edema BL    Significant Labs:   All pertinent labs within the past 24 hours have been reviewed.  Blood Culture:   Recent Labs   Lab 01/29/22  1332 01/29/22  1343   LABBLOO No Growth to date No Growth to date     CBC:   Recent Labs   Lab 01/29/22  0330 01/30/22  0318   WBC 18.76* 22.62*   HGB 8.1* 8.1*   HCT 26.4* 26.0*    307     CMP:   Recent Labs   Lab 01/29/22  0330 01/30/22 0318   * 134*   K 3.7 3.8    103   CO2 23 22*   GLU 84 135*   BUN 20 18   CREATININE 0.7 0.7   CALCIUM 7.7* 7.7*   PROT 4.6* 4.8*   ALBUMIN 2.0* 2.1*   BILITOT 0.7 0.7    ALKPHOS 85 93   AST 14 13   ALT 16 18   ANIONGAP 6* 9   EGFRNONAA >60.0 >60.0     Urine Culture: No results for input(s): LABURIN in the last 48 hours.    Significant Imaging: I have reviewed all pertinent imaging results/findings within the past 24 hours.     TTE 01/27:  · The left ventricle is normal in size with concentric remodeling and normal systolic function. The estimated ejection fraction is 55%.  · Normal right ventricular size with normal right ventricular systolic function.  · Normal left ventricular diastolic function.  · Mild mitral regurgitation.  · No evidence of intracardiac vegetation    CXR:  FINDINGS:  Cardiac silhouette is unchanged.  Similar prominence of the upper mediastinum in this patient known goiter.  Scattered interstitial and airspace opacity with slight clearing at the right upper lobe and left base.  No significant pleural effusion or gross pneumothorax.  Advanced glenohumeral DJD.     Impression:     As above.      Assessment/Plan:      * Acute hypoxemic respiratory failure  - Resolved- appeared to be 2/2 CHF, COPD and Interstitial lung disease + COVID-19 infection  - Stable on room air    Enterococcal bacteremia  - BCx 01/23- Enterococcus faecalis  - Repeat BCx 01/24- contaminant, otherwise NGTD  - Appreciate ID recs: 14d total course of antibiotics- EOT 02/07/22  - Continue amp 2g q4h  - Obtain weekly outpatient laboratory on Monday or Tuesday while on IV antibiotics: CBC, CMP      Fax laboratory results to [facility] at [phone] with attn: Dr. Porter     Outpatient Infectious Diseases clinic follow up will be arranged and found in patient calendar.     Prior to discharge, please ensure the patient's follow-up has been scheduled.  If there is still no follow-up scheduled in Infectious Diseases clinic, please send an EPIC message to Gisele Smith in Infectious Diseases.    Leukocytosis  - Uptrending WBC- thrush on physical exam- likely culprit  - neutrophilic  predominance  - beginning treatment for thrush w/fluconazole- immunocompromised state w/ chronic steroid use   - Has been on dex since 01/23, but no previous leukocytosis after 36hrs of use  - Repeat BCx and Resp Cx w/no growth       Thrush  - white plaques present w/in oropharynx  - no dysphagia or odoynophagia  - no concern for esophageal thrush  - begin treatment w/fluconazole for 14 days total    UTI (urinary tract infection)  - UCx w/Ecoli 01/23- continue amp  - EOT 02/07/22      COVID-19 virus infection  - Anticoagulation: heparin  - Remdesivir x 5 days (1/23), holding plaquenil (QT prolongation and negative effect to remdesivir)   - Holding cellcept  - Dexamethasone 6mg x 10 days (1/23), holding prednisone   - on room air  - Continuous pulse ox  - Airborne + contact precautions    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   - Historically prescribe bactrim for PCP prophylaxis  - Holding prednisone as currently administering dex  - holding Plaquenil and cellcept as well    COPD exacerbation  - resolved  - continue duoneb q6h for sob/wheeze      Chronic systolic (congestive) heart failure  - TTE 02/27/20:  Grade 1 diastolic dysfunction, EF 55%  - No exacerbation at this time  - Stable, continue GDMT    Multinodular goiter  - Enlarged thyroid gland resulting in leftward deviation and mass effect upon the airway => anticipate difficult airway access   - Prior consultations with surgery, patient lost following up due to multiple recent medical events     AF (paroxysmal atrial fibrillation)  - Spoke w/NSGY- ok to resume oral anticoagulation- stop heparin drip, begin eliquis   - Recent head bleed 12/13  - CHADSVASC of 7    Oropharyngeal dysphagia  - Evaluated by SLP- diet recommendations- dental soft, think liquids  - Aspiration precautions: 1 bite/sip at a time, Assistance with meals, Avoid talking while eating, Eliminate  distractions, Feed only when awake/alert, HOB to 90 degrees, Meds whole 1 at a time, Remain upright 30 minutes post meal and Small bites/sips        Chronic renal failure syndrome, stage 3 (moderate)  - stable renal function   - strict I and O  - Continue PO lasix to 40mg qday      Long QT interval  - last ecg 01/25- QTc 464  - avoid QT prolonging agents if possible   - likely 2/2 plaquenil use  - repeat ECG in am as having to start fluconazole for thrush       Hypertension, essential  - BP currently well-controlled  - Holding home regimen of amlodipine   - Will continue to monitor and further titrate antihypertensives as clinically indicated       PUD (peptic ulcer disease)  - continue PPI        VTE Risk Mitigation (From admission, onward)         Ordered     apixaban tablet 5 mg  2 times daily         01/30/22 1420     IP VTE HIGH RISK PATIENT  Once         01/23/22 1259     Place sequential compression device  Until discontinued         01/23/22 1259                Discharge Planning   LETICIA: 2/1/2022     Code Status: Full Code   Is the patient medically ready for discharge?:     Reason for patient still in hospital (select all that apply): Patient trending condition  Discharge Plan A: Rehab   Discharge Delays: None known at this time                Jovana Sahni MD  Department of Hospital Medicine   [facility] - Telemetry Stepdown

## 2022-01-31 NOTE — ASSESSMENT & PLAN NOTE
- white plaques present w/in oropharynx  - no dysphagia or odoynophagia  - no concern for esophageal thrush  - begin treatment w/fluconazole for 14 days total

## 2022-01-31 NOTE — PT/OT/SLP PROGRESS
"Physical Therapy Treatment    Patient Name:  Selma Lux   MRN:  [id number]    Recommendations:     Discharge Recommendations:  rehabilitation facility   Discharge Equipment Recommendations:  (TBD pending progress)   Barriers to discharge: Increased skilled assistance needed    Assessment:     Selma Lux is a 86 y.o. female admitted with a medical diagnosis of Acute hypoxemic respiratory failure.  She presents with the following impairments/functional limitations:  weakness,impaired endurance,impaired self care skills,impaired functional mobilty,gait instability,impaired balance,impaired cognition,decreased coordination,decreased upper extremity function,decreased lower extremity function,decreased safety awareness. Patient tolerated session fairly this date. Patient initially agreeable and eager to complete mobility this date. Once attempting bed mobility - patient becoming very tearful - stating "I have not walked since 2018. I hate this unit. Everyone is a dictator." Patient appearing restless. Patient educated on the importance of mobility and therapeutic, active listening provided. Patient would continue to benefit from skilled PT services while in the hospital. At this time, upon d/c recommend IPR in order for patient to progress towards an improved level of functional mobility independence.     Rehab Prognosis: Fair+; patient would benefit from acute skilled PT services to address these deficits and reach maximum level of function.    Recent Surgery: * No surgery found *      Plan:     During this hospitalization, patient to be seen 3 x/week to address the identified rehab impairments via gait training,therapeutic activities,therapeutic exercises,neuromuscular re-education and progress toward the following goals:    · Plan of Care Expires:  02/22/22    Subjective     Chief Complaint: none   Patient/Family Comments/goals: "I can't do anymore today."  Pain/Comfort:  · Pain Rating 1: 0/10  · Pain " Rating Post-Intervention 1: 0/10      Objective:     Communicated with RN prior to session.  Selma Rosado Lux found HOB elevated with telemetry,peripheral IV,pulse ox (continuous),PureWick upon PT entry to room.     General Precautions: Standard, airborne,contact,droplet,fall   Orthopedic Precautions:N/A   Braces: N/A     Functional Mobility:  · Bed Mobility:     · Rolling Left:  moderate assistance  · Scooting: moderate assistance     Further mobility deferred - patient tearful and not open to more mobility this date     While supine - x15 ankle pumps, SLR, SKTC completed - encouraged to complete 2-3x/day       AM-PAC 6 CLICK MOBILITY  Turning over in bed (including adjusting bedclothes, sheets and blankets)?: 3  Sitting down on and standing up from a chair with arms (e.g., wheelchair, bedside commode, etc.): 2  Moving from lying on back to sitting on the side of the bed?: 2  Moving to and from a bed to a chair (including a wheelchair)?: 2  Need to walk in hospital room?: 1  Climbing 3-5 steps with a railing?: 1  Basic Mobility Total Score: 11       Therapeutic Activities and Education:  -Patient educated on the continued role and goal of PT  -Questions and concerns answered within the the PT scope of practice.   -White board updated in patient room to reflect level of assistance needed with nursing.   -Patient not yet safe for mobility with RN    Selma Rosado Lux left HOB elevated with all lines intact, call button in reach, bed alarm on and RN notified..    GOALS:   Multidisciplinary Problems     Physical Therapy Goals        Problem: Physical Therapy Goal    Goal Priority Disciplines Outcome Goal Variances Interventions   Physical Therapy Goal     PT, PT/OT Ongoing, Progressing     Description: Goals to be met by: 22    Patient will increase functional independence with mobility by performin. Supine to sit with MInimal Assistance -not met  2. Sit to stand transfer with Minimal Assistance with  RW - not met  3. Bed to chair transfer with Minimal Assistance using Rolling Walker -not met  4. Gait  x 20 feet with Moderate Assistance using Rolling Walker. -not met  5. Ascend/descend 5 stair with right Handrails Moderate Assistance -not met  6. Sitting at edge of bed x10 minutes with Contact Guard Assistance to perform activities and increase endurance - not met                     Time Tracking:     PT Received On: 01/31/22  PT Start Time: 1103     PT Stop Time: 1120  PT Total Time (min): 17 min     Billable Minutes: Therapeutic Activity 15min    Treatment Type: Treatment  PT/PTA: PT     PTA Visit Number: 0     Lila Michel, PT  01/31/2022

## 2022-02-01 LAB
ALBUMIN SERPL BCP-MCNC: 2.2 G/DL (ref 3.5–5.2)
ALP SERPL-CCNC: 81 U/L (ref 55–135)
ALT SERPL W/O P-5'-P-CCNC: 20 U/L (ref 10–44)
ANION GAP SERPL CALC-SCNC: 8 MMOL/L (ref 8–16)
AST SERPL-CCNC: 17 U/L (ref 10–40)
BILIRUB SERPL-MCNC: 0.9 MG/DL (ref 0.1–1)
BUN SERPL-MCNC: 15 MG/DL (ref 8–23)
CALCIUM SERPL-MCNC: 8.1 MG/DL (ref 8.7–10.5)
CHLORIDE SERPL-SCNC: 104 MMOL/L (ref 95–110)
CO2 SERPL-SCNC: 24 MMOL/L (ref 23–29)
CREAT SERPL-MCNC: 0.7 MG/DL (ref 0.5–1.4)
EST. GFR  (AFRICAN AMERICAN): >60 ML/MIN/1.73 M^2
EST. GFR  (NON AFRICAN AMERICAN): >60 ML/MIN/1.73 M^2
GLUCOSE SERPL-MCNC: 104 MG/DL (ref 70–110)
MAGNESIUM SERPL-MCNC: 1.8 MG/DL (ref 1.6–2.6)
PHOSPHATE SERPL-MCNC: 2.6 MG/DL (ref 2.7–4.5)
POTASSIUM SERPL-SCNC: 4.2 MMOL/L (ref 3.5–5.1)
PROT SERPL-MCNC: 5 G/DL (ref 6–8.4)
SODIUM SERPL-SCNC: 136 MMOL/L (ref 136–145)

## 2022-02-01 PROCEDURE — 25000003 PHARM REV CODE 250: Performed by: HOSPITALIST

## 2022-02-01 PROCEDURE — 63700000 PHARM REV CODE 250 ALT 637 W/O HCPCS: Performed by: STUDENT IN AN ORGANIZED HEALTH CARE EDUCATION/TRAINING PROGRAM

## 2022-02-01 PROCEDURE — 83735 ASSAY OF MAGNESIUM: CPT | Performed by: EMERGENCY MEDICINE

## 2022-02-01 PROCEDURE — 97530 THERAPEUTIC ACTIVITIES: CPT

## 2022-02-01 PROCEDURE — 25000003 PHARM REV CODE 250: Performed by: STUDENT IN AN ORGANIZED HEALTH CARE EDUCATION/TRAINING PROGRAM

## 2022-02-01 PROCEDURE — 80053 COMPREHEN METABOLIC PANEL: CPT | Performed by: EMERGENCY MEDICINE

## 2022-02-01 PROCEDURE — 97535 SELF CARE MNGMENT TRAINING: CPT

## 2022-02-01 PROCEDURE — 25000003 PHARM REV CODE 250: Performed by: EMERGENCY MEDICINE

## 2022-02-01 PROCEDURE — 36415 COLL VENOUS BLD VENIPUNCTURE: CPT | Performed by: EMERGENCY MEDICINE

## 2022-02-01 PROCEDURE — 94640 AIRWAY INHALATION TREATMENT: CPT

## 2022-02-01 PROCEDURE — 94761 N-INVAS EAR/PLS OXIMETRY MLT: CPT

## 2022-02-01 PROCEDURE — 84100 ASSAY OF PHOSPHORUS: CPT | Performed by: EMERGENCY MEDICINE

## 2022-02-01 PROCEDURE — 27000207 HC ISOLATION

## 2022-02-01 PROCEDURE — 25000242 PHARM REV CODE 250 ALT 637 W/ HCPCS: Performed by: STUDENT IN AN ORGANIZED HEALTH CARE EDUCATION/TRAINING PROGRAM

## 2022-02-01 PROCEDURE — 99900035 HC TECH TIME PER 15 MIN (STAT)

## 2022-02-01 PROCEDURE — 63600175 PHARM REV CODE 636 W HCPCS

## 2022-02-01 PROCEDURE — 99232 SBSQ HOSP IP/OBS MODERATE 35: CPT | Mod: CR,,, | Performed by: STUDENT IN AN ORGANIZED HEALTH CARE EDUCATION/TRAINING PROGRAM

## 2022-02-01 PROCEDURE — 99232 PR SUBSEQUENT HOSPITAL CARE,LEVL II: ICD-10-PCS | Mod: CR,,, | Performed by: STUDENT IN AN ORGANIZED HEALTH CARE EDUCATION/TRAINING PROGRAM

## 2022-02-01 PROCEDURE — 97112 NEUROMUSCULAR REEDUCATION: CPT

## 2022-02-01 PROCEDURE — 20600001 HC STEP DOWN PRIVATE ROOM

## 2022-02-01 PROCEDURE — 63600175 PHARM REV CODE 636 W HCPCS: Performed by: STUDENT IN AN ORGANIZED HEALTH CARE EDUCATION/TRAINING PROGRAM

## 2022-02-01 RX ORDER — FLUCONAZOLE 200 MG/1
200 TABLET ORAL DAILY
Status: DISCONTINUED | OUTPATIENT
Start: 2022-02-02 | End: 2022-02-02

## 2022-02-01 RX ADMIN — APIXABAN 5 MG: 5 TABLET, FILM COATED ORAL at 09:02

## 2022-02-01 RX ADMIN — AMPICILLIN 2 G: 2 INJECTION, POWDER, FOR SOLUTION INTRAMUSCULAR; INTRAVENOUS at 10:02

## 2022-02-01 RX ADMIN — AMPICILLIN 2 G: 2 INJECTION, POWDER, FOR SOLUTION INTRAMUSCULAR; INTRAVENOUS at 06:02

## 2022-02-01 RX ADMIN — AMPICILLIN 2 G: 2 INJECTION, POWDER, FOR SOLUTION INTRAMUSCULAR; INTRAVENOUS at 11:02

## 2022-02-01 RX ADMIN — IPRATROPIUM BROMIDE AND ALBUTEROL SULFATE 3 ML: 2.5; .5 SOLUTION RESPIRATORY (INHALATION) at 07:02

## 2022-02-01 RX ADMIN — FUROSEMIDE 40 MG: 40 TABLET ORAL at 09:02

## 2022-02-01 RX ADMIN — PANTOPRAZOLE SODIUM 40 MG: 40 TABLET, DELAYED RELEASE ORAL at 09:02

## 2022-02-01 RX ADMIN — ACETAMINOPHEN 650 MG: 325 TABLET ORAL at 09:02

## 2022-02-01 RX ADMIN — MELATONIN TAB 3 MG 6 MG: 3 TAB at 09:02

## 2022-02-01 RX ADMIN — IPRATROPIUM BROMIDE AND ALBUTEROL SULFATE 3 ML: 2.5; .5 SOLUTION RESPIRATORY (INHALATION) at 08:02

## 2022-02-01 RX ADMIN — AMPICILLIN 2 G: 2 INJECTION, POWDER, FOR SOLUTION INTRAMUSCULAR; INTRAVENOUS at 03:02

## 2022-02-01 RX ADMIN — FLUCONAZOLE 100 MG: 100 TABLET ORAL at 09:02

## 2022-02-01 RX ADMIN — IPRATROPIUM BROMIDE AND ALBUTEROL SULFATE 3 ML: 2.5; .5 SOLUTION RESPIRATORY (INHALATION) at 01:02

## 2022-02-01 RX ADMIN — DEXAMETHASONE SODIUM PHOSPHATE 6 MG: 4 INJECTION INTRA-ARTICULAR; INTRALESIONAL; INTRAMUSCULAR; INTRAVENOUS; SOFT TISSUE at 09:02

## 2022-02-01 RX ADMIN — AMPICILLIN 2 G: 2 INJECTION, POWDER, FOR SOLUTION INTRAMUSCULAR; INTRAVENOUS at 02:02

## 2022-02-01 NOTE — PROGRESS NOTES
Pharmacist Renal Dose Adjustment Note    Selma Lux is a 86 y.o. female being treated with the medication fluconazole    Patient Data:    Vital Signs (Most Recent):  Temp: 98.8 °F (37.1 °C) (02/01/22 0751)  Pulse: 89 (02/01/22 0801)  Resp: 18 (02/01/22 0757)  BP: (!) 126/59 (02/01/22 0751)  SpO2: 100 % (02/01/22 0801)   Vital Signs (72h Range):  Temp:  [96.8 °F (36 °C)-98.8 °F (37.1 °C)]   Pulse:  []   Resp:  [14-28]   BP: (104-155)/(48-78)   SpO2:  [95 %-100 %]      Recent Labs   Lab 01/30/22  0318 01/31/22  0444 02/01/22  0537   CREATININE 0.7 0.8 0.7     Serum creatinine: 0.7 mg/dL 02/01/22 0537  Estimated creatinine clearance: 56.9 mL/min    Fluconazole 100mg PO daily was changed to 200mg PO daily for CrCl>50 ml/min    Elsa Lincoln, PharmD, BCPS  [id number]

## 2022-02-01 NOTE — CONSULTS
Thank you for your consult to [facility]. We have reviewed the patient chart. This patient does not meet criteria for [facility] service at this time due to Patient is unlikely to participate well with the telemedicine platform due to vascular dementia with limited camera modalities in current location. and Patient has a condition likely to benefit from in-depth physical exam, or palpation / auscultation, or serial abdominal exams, or CHF, COPD exac, ILD monitoring . Will hand back to In-house service.     Hayley Haney MD

## 2022-02-01 NOTE — SUBJECTIVE & OBJECTIVE
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 Good UOP and BMs. Good oral intake. Pt reports she is ready to go back to rehab. No nausea, vomiting, chills, dysuria, hematuria, abd pain, constipation.    Objective:     Vital Signs (Most Recent):  Temp: 98.6 °F (37 °C) (01/31/22 1917)  Pulse: 97 (01/31/22 1917)  Resp: 18 (01/31/22 1917)  BP: 119/77 (01/31/22 1917)  SpO2: 98 % (01/31/22 1917) Vital Signs (24h Range):  Temp:  [97.5 °F (36.4 °C)-98.7 °F (37.1 °C)] 98.6 °F (37 °C)  Pulse:  [90-97] 97  Resp:  [18-24] 18  SpO2:  [95 %-100 %] 98 %  BP: (119-155)/(61-77) 119/77     Weight: 68.5 kg (151 lb)  Body mass index is 25.92 kg/m².    Intake/Output Summary (Last 24 hours) at 1/31/2022 2124  Last data filed at 1/31/2022 1715  Gross per 24 hour   Intake 1370 ml   Output 350 ml   Net 1020 ml     Physical Exam  Gen: in NAD, appears stated age, appears fatigued, but comfortable  Neuro: AAOx3, motor, sensory, and strength grossly intact BL  HEENT: NTNC, EOMI, PERRL, MMM- white plaques w/in oropharynx  CVS: RRR, no m/r/g; S1/S2 auscultated with no S3 or S4; capillary refill < 2 sec  Resp:productive cough, transmitted upper respiratory sounds, no wheezes or focal crackles, no belabored breathing or accessory muscle use appreciated   Abd: BS+ in all 4 quadrants; NTND, soft to palpation; no organomegaly appreciated  Extrem: pulses full, equal, and regular over all 4 extremities; edema of RUE, no edema of other ext    Significant Labs:   All pertinent labs within the past 24 hours have been reviewed.  Blood Culture:   No results for input(s): LABBLOO in the last 48 hours.  CBC:   Recent Labs   Lab 01/30/22  0318 01/31/22  1154   WBC 22.62* 19.75*   HGB 8.1* 8.1*   HCT 26.0* 26.4*    321     CMP:   Recent Labs   Lab 01/30/22  0318 01/31/22  0444   * 135*   K 3.8 4.3    106   CO2 22* 20*   * 201*   BUN 18 19   CREATININE 0.7 0.8   CALCIUM 7.7* 7.8*   PROT 4.8* 4.7*   ALBUMIN 2.1* 2.1*    BILITOT 0.7 0.6   ALKPHOS 93 87   AST 13 21   ALT 18 22   ANIONGAP 9 9   EGFRNONAA >60.0 >60.0     Urine Culture: No results for input(s): LABURIN in the last 48 hours.    Significant Imaging: I have reviewed all pertinent imaging results/findings within the past 24 hours.     TTE 01/27:  · The left ventricle is normal in size with concentric remodeling and normal systolic function. The estimated ejection fraction is 55%.  · Normal right ventricular size with normal right ventricular systolic function.  · Normal left ventricular diastolic function.  · Mild mitral regurgitation.  · No evidence of intracardiac vegetation    CXR:  FINDINGS:  Cardiac silhouette is unchanged.  Similar prominence of the upper mediastinum in this patient known goiter.  Scattered interstitial and airspace opacity with slight clearing at the right upper lobe and left base.  No significant pleural effusion or gross pneumothorax.  Advanced glenohumeral DJD.     Impression:     As above.

## 2022-02-01 NOTE — PLAN OF CARE
Problem: Infection  Goal: Absence of Infection Signs and Symptoms  Outcome: Ongoing, Progressing     Problem: Adult Inpatient Plan of Care  Goal: Plan of Care Review  Outcome: Ongoing, Progressing  Goal: Optimal Comfort and Wellbeing  Outcome: Ongoing, Progressing     Problem: Impaired Wound Healing  Goal: Optimal Wound Healing  Outcome: Ongoing, Progressing     Problem: Fall Injury Risk  Goal: Absence of Fall and Fall-Related Injury  Outcome: Ongoing, Progressing     Problem: Skin Injury Risk Increased  Goal: Skin Health and Integrity  Outcome: Ongoing, Progressing    AAOx4 this shift.  Productive cough.  O2 sats remain >93 on RA.  Repositioned as ordered.  Excoriation to buttocks improving.  Less diarrhea noted this shift.  Appetite improving.  Denies pain this shift. Will continue to monitor. Side rails up x 2, call light in reach, bed low and locked.

## 2022-02-01 NOTE — ASSESSMENT & PLAN NOTE
- Anticoagulation: eliquis  - Remdesivir x 5 days (1/23), holding plaquenil (QT prolongation and negative effect to remdesivir)   - Holding cellcept  - Dexamethasone 6mg x 10 days (1/23), holding prednisone   - on room air  - Continuous pulse ox  - Airborne + contact precautions

## 2022-02-01 NOTE — PT/OT/SLP PROGRESS
"Occupational Therapy   Co-Treatment    Name: Selma Lux  MRN: [id number]  Admitting Diagnosis:  Acute hypoxemic respiratory failure     COVID    Recommendations:     Discharge Recommendations: rehabilitation facility  Discharge Equipment Recommendations: wheelchair  Barriers to discharge: Inaccessible home environment,Decreased caregiver support    Assessment:     Selma Lux is a 86 y.o. female with a medical diagnosis of Acute hypoxemic respiratory failure. She presents with performance deficits including weakness,impaired endurance,impaired self care skills,impaired functional mobilty,gait instability,impaired balance,decreased safety awareness,decreased lower extremity function,decreased upper extremity function,impaired cardiopulmonary response to activity. Pt alert and cooperative, motivated to participate in OOB activity. Pt progressing toward goals and would continue to benefit from OT to increase functional independence and safety. Recommend return to rehab upon D/C as pt is motivated to return to PLOF.     Rehab Prognosis: Good; patient would benefit from acute skilled OT services to address these deficits and reach maximum level of function.       Plan:     Patient to be seen 3 x/week to address the above listed problems via self-care/home management,therapeutic activities,therapeutic exercises,neuromuscular re-education  · Plan of Care Expires: 02/22/22  · Plan of Care Reviewed with: patient    Subjective     Pain/Comfort:  · Pain Rating 1: 0/10  · Pain Rating Post-Intervention 1: 0/10  · Location 2:  (discomfort L upper arm midline)    Objective:     Communicated with: RN prior to session. Patient found with HOB elevated with telemetry,pulse ox (continuous),peripheral IV (L midline), pressure relief boots upon OT entry to room, no family present.  Co-treatment completed with PT due to decreased activity tolerance in the presence of COVID-19.  "What's the goal today? I want to walk to " "that bathroom."    General Precautions: Standard, airborne,contact,droplet,fall   Orthopedic Precautions:N/A   Braces: N/A  Respiratory Status: Room air     Occupational Performance:     Bed Mobility:    · Patient completed Scooting along EOB while seated with maximal assistance  · Patient completed Supine to Sit with moderate assistance  · Patient completed Sit to Supine with minimum assistance     Functional Mobility/Transfers:  · Patient completed Sit <> Stand Transfer with moderate assistance and of 2 persons with hand-held assist from EOB and then with rolling walker from EOB; requires blocking of B knees due to weakness and giving out in standing  · Functional Mobility: unable to attempt this date due to weakness and SOB with exertion, unable to maintain static standing    Activities of Daily Living:  · Feeding: set-up assistance with HOB elevated to eat breakfast    · Grooming: set-up assistance with HOB elevated to complete oral hygiene and wash face-- unable to complete while EOB due to SOB and needing to return to bed, O2 95% and above on room air    · Lower Body Dressing: total assistance to don socks      AMPAC 6 Click ADL: 15    Treatment & Education:  Pt sat EOB ~10 minutes with close SBA-CGA; completed standing trials with and without rolling walker and practiced scooting along EOB in prep for further standing trials; pt unable to tolerate transfer to bedside chair or grooming tasks while EOB due to SOB and was assisted back to bed and with repositioning to complete grooming tasks; discussed POC and D/C recs as well as progression with mobility and ADL tasks and to call for assistance    Patient left HOB elevated with all lines intact, call button in reach, bed alarm on and RN notifiedEducation:      GOALS:   Multidisciplinary Problems     Occupational Therapy Goals        Problem: Occupational Therapy Goal    Goal Priority Disciplines Outcome Interventions   Occupational Therapy Goal     OT, PT/OT " Ongoing, Progressing    Description: Goals to be met by: 2/7/2022    Patient will increase functional independence with ADLs by performing:    UE Dressing with Minimal Assistance.  Grooming while EOB with Moderate Assistance.  Toileting from bedside commode with Moderate Assistance for hygiene and clothing management.   Sitting at edge of bed x10 minutes with Moderate Assistance.  Toilet transfer to bedside commode with Moderate Assistance.                     Time Tracking:     OT Date of Treatment: 02/01/22  OT Start Time: 0927  OT Stop Time: 0952  OT Total Time (min): 25 min    Billable Minutes:Self Care/Home Management 10  Therapeutic Activity 15    OT/GENE: OT          2/1/2022

## 2022-02-01 NOTE — ASSESSMENT & PLAN NOTE
- BCx 01/23- Enterococcus faecalis  - Repeat BCx 01/24- contaminant, otherwise NGTD  - Appreciate ID recs: 14d total course of antibiotics- EOT 02/07/22  - Continue amp 2g q4h  - Obtain weekly outpatient laboratory on Monday or Tuesday while on IV antibiotics: CBC, CMP      Fax laboratory results to [facility] at [phone] with attn: Dr. Porter     Outpatient Infectious Diseases clinic follow up will be arranged and found in patient calendar.     Prior to discharge, please ensure the patient's follow-up has been scheduled.  If there is still no follow-up scheduled in Infectious Diseases clinic, please send an EPIC message to Gisele Smith in Infectious Diseases.

## 2022-02-01 NOTE — PROGRESS NOTES
[facility] - Tele[facility] Medicine  Progress Note    Patient Name: Selma Lux  MRN: [id number]  Patient Class: IP- Inpatient   Admission Date: 1/23/2022  Length of Stay: 8 days  Attending Physician: Jovana Sahni MD  Primary Care Provider: Bhargav Hirsch MD        Subjective:     Principal Problem:Acute hypoxemic respiratory failure        HPI:  Selma Lux is a 84 y.o. 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  She has had encephalopathy during her hospital stay and admission to rehab. She tested positive for COVID yesterday.  Paramedics state that she has been having very poor oral intake.      Collateral information from her daughter.  She notes that she has had difficulty breathing for several months.  She states she has cryptogenic pneumonia and received steroids and breathing treatments for this. 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  She noted that she had been on the ventilator and really hated being on the ventilator.  However, she would want to be intubated if her life depended on it.  She would want to try everything possible to avoid intubation.  She noted that she tested positive for COVID yesterday along with her mother.  She states that she is not able to come see her mother due to coronavirus.  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     ED course: Patient with positive COVID test and respiratory distress. This seems to be a recurrent problem  with her history of cryptogenic organizing pneumonia. Prior chest x-rays seems to have had a similar appearance to the x-ray from today.  Diffuse infiltrates.  This resolved on recent chest x-rays from her admission.  Of note this recent admission is under a different registration under the same name in epic.   Her venous blood gas does not show respiratory failure or CO2 retention. She seems to have encephalopathy but this has been an ongoing issue. Her sodium today is normalized. Patient has markedly infected urine and an elevated white count.  ED started broad-spectrum IV antibiotics.      Overview/Hospital Course:  Selma Lux is a 84 y.o. 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  She has had encephalopathy during her hospital stay and admission to rehab. She tested positive for COVID on 1/23.  Paramedics state that she has been having very poor oral intake. Patient was admitted to ICU for hypotension and respiratory distress, anticipating rapid deterioration 2/2 her medical history. Patient is on 4L NC, SpO2 stable, she had episode of hypotensions but not sustained, no pressor indicated. Mentation improved in the morning. Patient required sedation for procedure. Family visit seemed to calm her down. Patient no longer required Precedex gtt. Qtc prolongation resolved, likely 2/2 to chronic plaquenil. Patient sat well on room air. Patient ready to transfer to lower level of care unit. ID consulted. Maricruz and rocephin Dc'd 01/28. Transitioned to amp- completing 14d course. Found to have thrush w/up-trending WBC. Started on fluconazole 01/30.  Improvement in leukocytosis. Deemed stable for discharge to rehab 01/31.       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 Good UOP and BMs. Good oral intake. Pt reports she is ready to go back to rehab. No nausea, vomiting, chills, dysuria, hematuria, abd pain, constipation.    Objective:     Vital Signs (Most Recent):  Temp: 98.6 °F (37 °C) (01/31/22 1917)  Pulse: 97 (01/31/22 1917)  Resp: 18 (01/31/22 1917)  BP: 119/77 (01/31/22 1917)  SpO2: 98 % (01/31/22 1917) Vital Signs (24h Range):  Temp:  [97.5 °F (36.4 °C)-98.7 °F (37.1 °C)] 98.6 °F (37 °C)  Pulse:  [90-97] 97  Resp:  [18-24] 18  SpO2:  [95 %-100 %] 98 %  BP: (119-155)/(61-77) 119/77     Weight: 68.5 kg (151 lb)  Body mass index is 25.92 kg/m².    Intake/Output Summary (Last 24 hours) at 1/31/2022 2124  Last data filed at 1/31/2022 1715  Gross per 24 hour   Intake 1370 ml   Output 350 ml   Net 1020 ml     Physical Exam  Gen: in NAD, appears stated age, appears fatigued, but comfortable  Neuro: AAOx3, motor, sensory, and strength grossly intact BL  HEENT: NTNC, EOMI, PERRL, MMM- white plaques w/in oropharynx  CVS: RRR, no m/r/g; S1/S2 auscultated with no S3 or S4; capillary refill < 2 sec  Resp:productive cough, transmitted upper respiratory sounds, no wheezes or focal crackles, no belabored breathing or accessory muscle use appreciated   Abd: BS+ in all 4 quadrants; NTND, soft to palpation; no organomegaly appreciated  Extrem: pulses full, equal, and regular over all 4 extremities; edema of RUE, no edema of other ext    Significant Labs:   All pertinent labs within the past 24 hours have been reviewed.  Blood Culture:   No results for input(s): LABBLOO in the last 48 hours.  CBC:   Recent Labs   Lab 01/30/22 0318 01/31/22  1154   WBC 22.62* 19.75*   HGB 8.1* 8.1*   HCT 26.0* 26.4*    321     CMP:   Recent Labs   Lab 01/30/22 0318 01/31/22  0444   * 135*   K 3.8 4.3    106   CO2 22* 20*   * 201*   BUN 18 19    CREATININE 0.7 0.8   CALCIUM 7.7* 7.8*   PROT 4.8* 4.7*   ALBUMIN 2.1* 2.1*   BILITOT 0.7 0.6   ALKPHOS 93 87   AST 13 21   ALT 18 22   ANIONGAP 9 9   EGFRNONAA >60.0 >60.0     Urine Culture: No results for input(s): LABURIN in the last 48 hours.    Significant Imaging: I have reviewed all pertinent imaging results/findings within the past 24 hours.     TTE 01/27:  · The left ventricle is normal in size with concentric remodeling and normal systolic function. The estimated ejection fraction is 55%.  · Normal right ventricular size with normal right ventricular systolic function.  · Normal left ventricular diastolic function.  · Mild mitral regurgitation.  · No evidence of intracardiac vegetation    CXR:  FINDINGS:  Cardiac silhouette is unchanged.  Similar prominence of the upper mediastinum in this patient known goiter.  Scattered interstitial and airspace opacity with slight clearing at the right upper lobe and left base.  No significant pleural effusion or gross pneumothorax.  Advanced glenohumeral DJD.     Impression:     As above.      Assessment/Plan:      * Acute hypoxemic respiratory failure  - Resolved- appeared to be 2/2 CHF, COPD and Interstitial lung disease + COVID-19 infection  - Stable on room air    Enterococcal bacteremia  - BCx 01/23- Enterococcus faecalis  - Repeat BCx 01/24- contaminant, otherwise NGTD  - Appreciate ID recs: 14d total course of antibiotics- EOT 02/07/22  - Continue amp 2g q4h  - Obtain weekly outpatient laboratory on Monday or Tuesday while on IV antibiotics: CBC, CMP      Fax laboratory results to [facility] at [phone] with attn: Dr. Porter     Outpatient Infectious Diseases clinic follow up will be arranged and found in patient calendar.     Prior to discharge, please ensure the patient's follow-up has been scheduled.  If there is still no follow-up scheduled in Infectious Diseases clinic, please send an EPIC message to Gisele Smith in Infectious  Diseases.    Leukocytosis  - Improving- likely 2/2 thrush- continuing w/fluconazole  - Repeat BCx and Resp Cx w/no growth       Thrush  - white plaques present w/in oropharynx  - no dysphagia or odoynophagia  - no concern for esophageal thrush  - continue treatment w/fluconazole for 14 days total- EOT 02/13    UTI (urinary tract infection)  - UCx w/Ecoli 01/23- continue amp  - EOT 02/07/22      COVID-19 virus infection  - Anticoagulation: eliquis  - Remdesivir x 5 days (1/23), holding plaquenil (QT prolongation and negative effect to remdesivir)   - Holding cellcept  - Dexamethasone 6mg x 10 days (1/23), holding prednisone   - on room air  - Continuous pulse ox  - Airborne + contact precautions    Immunocompromised state due to drug therapy  - Patient is on chronic prednisone for her , on Plaquenil and Cellcept for RA.   - Historically prescribe bactrim for PCP prophylaxis  - Holding prednisone as currently administering dex  - holding Plaquenil and cellcept as well    COPD exacerbation  - resolved  - continue duoneb q6h for sob/wheeze      Chronic systolic (congestive) heart failure  - TTE 02/27/20:  Grade 1 diastolic dysfunction, EF 55%  - No exacerbation at this time  - Stable, continue GDMT    Multinodular goiter  - Enlarged thyroid gland resulting in leftward deviation and mass effect upon the airway => anticipate difficult airway access   - Prior consultations with surgery, patient lost following up due to multiple recent medical events     AF (paroxysmal atrial fibrillation)  - Continue Eliquis  - Recent head bleed 12/13  - CHADSVASC of 7    Oropharyngeal dysphagia  - Evaluated by SLP- diet recommendations- dental soft, think liquids  - Aspiration precautions: 1 bite/sip at a time, Assistance with meals, Avoid talking while eating, Eliminate distractions, Feed only when awake/alert, HOB to 90 degrees, Meds whole 1 at a time, Remain upright 30 minutes post meal and Small bites/sips        Chronic renal  failure syndrome, stage 3 (moderate)  - stable renal function   - strict I and O  - Continue PO lasix to 40mg qday      Long QT interval  - last ecg 01/25- QTc 464  - avoid QT prolonging agents if possible   - likely 2/2 plaquenil use  - repeat ECG in am as having to start fluconazole for thrush       Hypertension, essential  - BP currently well-controlled  - Holding home regimen of amlodipine   - Will continue to monitor and further titrate antihypertensives as clinically indicated       PUD (peptic ulcer disease)  - continue PPI        VTE Risk Mitigation (From admission, onward)         Ordered     apixaban tablet 5 mg  2 times daily         01/30/22 1420     IP VTE HIGH RISK PATIENT  Once         01/23/22 1259     Place sequential compression device  Until discontinued         01/23/22 1259                Discharge Planning   LETICIA: 2/1/2022     Code Status: Full Code   Is the patient medically ready for discharge?:     Reason for patient still in hospital (select all that apply): Patient trending condition  Discharge Plan A: Rehab   Discharge Delays: None known at this time                Jovana Sahni MD  Department of Hospital Medicine   [facility] - Telemetry Stepdown

## 2022-02-02 LAB
ACANTHOCYTES BLD QL SMEAR: PRESENT
ALBUMIN SERPL BCP-MCNC: 2.2 G/DL (ref 3.5–5.2)
ALP SERPL-CCNC: 93 U/L (ref 55–135)
ALT SERPL W/O P-5'-P-CCNC: 25 U/L (ref 10–44)
ANION GAP SERPL CALC-SCNC: 8 MMOL/L (ref 8–16)
ANISOCYTOSIS BLD QL SMEAR: ABNORMAL
AST SERPL-CCNC: 21 U/L (ref 10–40)
BASOPHILS # BLD AUTO: 0.02 K/UL (ref 0–0.2)
BASOPHILS NFR BLD: 0.1 % (ref 0–1.9)
BILIRUB SERPL-MCNC: 0.8 MG/DL (ref 0.1–1)
BUN SERPL-MCNC: 17 MG/DL (ref 8–23)
BURR CELLS BLD QL SMEAR: ABNORMAL
CALCIUM SERPL-MCNC: 7.8 MG/DL (ref 8.7–10.5)
CHLORIDE SERPL-SCNC: 98 MMOL/L (ref 95–110)
CO2 SERPL-SCNC: 25 MMOL/L (ref 23–29)
CREAT SERPL-MCNC: 0.7 MG/DL (ref 0.5–1.4)
DIFFERENTIAL METHOD: ABNORMAL
EOSINOPHIL # BLD AUTO: 0 K/UL (ref 0–0.5)
EOSINOPHIL NFR BLD: 0 % (ref 0–8)
ERYTHROCYTE [DISTWIDTH] IN BLOOD BY AUTOMATED COUNT: 21.1 % (ref 11.5–14.5)
EST. GFR  (AFRICAN AMERICAN): >60 ML/MIN/1.73 M^2
EST. GFR  (NON AFRICAN AMERICAN): >60 ML/MIN/1.73 M^2
GLUCOSE SERPL-MCNC: 181 MG/DL (ref 70–110)
HCT VFR BLD AUTO: 26.9 % (ref 37–48.5)
HGB BLD-MCNC: 8.1 G/DL (ref 12–16)
HYPOCHROMIA BLD QL SMEAR: ABNORMAL
IMM GRANULOCYTES # BLD AUTO: 0.47 K/UL (ref 0–0.04)
IMM GRANULOCYTES NFR BLD AUTO: 3.1 % (ref 0–0.5)
LYMPHOCYTES # BLD AUTO: 1 K/UL (ref 1–4.8)
LYMPHOCYTES NFR BLD: 6.5 % (ref 18–48)
MAGNESIUM SERPL-MCNC: 1.8 MG/DL (ref 1.6–2.6)
MCH RBC QN AUTO: 28.2 PG (ref 27–31)
MCHC RBC AUTO-ENTMCNC: 30.1 G/DL (ref 32–36)
MCV RBC AUTO: 94 FL (ref 82–98)
MONOCYTES # BLD AUTO: 2.5 K/UL (ref 0.3–1)
MONOCYTES NFR BLD: 16.3 % (ref 4–15)
NEUTROPHILS # BLD AUTO: 11.2 K/UL (ref 1.8–7.7)
NEUTROPHILS NFR BLD: 74 % (ref 38–73)
NRBC BLD-RTO: 1 /100 WBC
OVALOCYTES BLD QL SMEAR: ABNORMAL
PHOSPHATE SERPL-MCNC: 2 MG/DL (ref 2.7–4.5)
PLATELET # BLD AUTO: 282 K/UL (ref 150–450)
PMV BLD AUTO: 11 FL (ref 9.2–12.9)
POIKILOCYTOSIS BLD QL SMEAR: ABNORMAL
POLYCHROMASIA BLD QL SMEAR: ABNORMAL
POTASSIUM SERPL-SCNC: 3.9 MMOL/L (ref 3.5–5.1)
PROT SERPL-MCNC: 4.8 G/DL (ref 6–8.4)
RBC # BLD AUTO: 2.87 M/UL (ref 4–5.4)
SCHISTOCYTES BLD QL SMEAR: ABNORMAL
SODIUM SERPL-SCNC: 131 MMOL/L (ref 136–145)
WBC # BLD AUTO: 15.13 K/UL (ref 3.9–12.7)

## 2022-02-02 PROCEDURE — 25000003 PHARM REV CODE 250: Performed by: EMERGENCY MEDICINE

## 2022-02-02 PROCEDURE — 94761 N-INVAS EAR/PLS OXIMETRY MLT: CPT

## 2022-02-02 PROCEDURE — 99232 SBSQ HOSP IP/OBS MODERATE 35: CPT | Mod: CR,,, | Performed by: STUDENT IN AN ORGANIZED HEALTH CARE EDUCATION/TRAINING PROGRAM

## 2022-02-02 PROCEDURE — 99900035 HC TECH TIME PER 15 MIN (STAT)

## 2022-02-02 PROCEDURE — 25000003 PHARM REV CODE 250: Performed by: STUDENT IN AN ORGANIZED HEALTH CARE EDUCATION/TRAINING PROGRAM

## 2022-02-02 PROCEDURE — 99232 PR SUBSEQUENT HOSPITAL CARE,LEVL II: ICD-10-PCS | Mod: CR,,, | Performed by: STUDENT IN AN ORGANIZED HEALTH CARE EDUCATION/TRAINING PROGRAM

## 2022-02-02 PROCEDURE — 80053 COMPREHEN METABOLIC PANEL: CPT | Performed by: EMERGENCY MEDICINE

## 2022-02-02 PROCEDURE — 84100 ASSAY OF PHOSPHORUS: CPT | Performed by: EMERGENCY MEDICINE

## 2022-02-02 PROCEDURE — 36415 COLL VENOUS BLD VENIPUNCTURE: CPT | Performed by: EMERGENCY MEDICINE

## 2022-02-02 PROCEDURE — 25000003 PHARM REV CODE 250: Performed by: HOSPITALIST

## 2022-02-02 PROCEDURE — 83735 ASSAY OF MAGNESIUM: CPT | Performed by: EMERGENCY MEDICINE

## 2022-02-02 PROCEDURE — 63600175 PHARM REV CODE 636 W HCPCS: Performed by: STUDENT IN AN ORGANIZED HEALTH CARE EDUCATION/TRAINING PROGRAM

## 2022-02-02 PROCEDURE — 27000207 HC ISOLATION

## 2022-02-02 PROCEDURE — 94640 AIRWAY INHALATION TREATMENT: CPT

## 2022-02-02 PROCEDURE — 85025 COMPLETE CBC W/AUTO DIFF WBC: CPT | Performed by: STUDENT IN AN ORGANIZED HEALTH CARE EDUCATION/TRAINING PROGRAM

## 2022-02-02 PROCEDURE — 25000242 PHARM REV CODE 250 ALT 637 W/ HCPCS: Performed by: STUDENT IN AN ORGANIZED HEALTH CARE EDUCATION/TRAINING PROGRAM

## 2022-02-02 PROCEDURE — 20600001 HC STEP DOWN PRIVATE ROOM

## 2022-02-02 RX ORDER — FLUCONAZOLE 100 MG/1
100 TABLET ORAL DAILY
Status: DISCONTINUED | OUTPATIENT
Start: 2022-02-02 | End: 2022-02-05

## 2022-02-02 RX ORDER — PREDNISONE 5 MG/1
5 TABLET ORAL DAILY
Status: DISCONTINUED | OUTPATIENT
Start: 2022-02-02 | End: 2022-02-08 | Stop reason: HOSPADM

## 2022-02-02 RX ORDER — PREDNISONE 5 MG/1
5 TABLET ORAL DAILY
Qty: 30 TABLET | Refills: 2
Start: 2022-02-02 | End: 2022-06-10

## 2022-02-02 RX ORDER — FLUCONAZOLE 100 MG/1
100 TABLET ORAL DAILY
Qty: 11 TABLET | Refills: 0
Start: 2022-02-02 | End: 2022-02-05 | Stop reason: HOSPADM

## 2022-02-02 RX ADMIN — AMPICILLIN 2 G: 2 INJECTION, POWDER, FOR SOLUTION INTRAMUSCULAR; INTRAVENOUS at 11:02

## 2022-02-02 RX ADMIN — AMPICILLIN 2 G: 2 INJECTION, POWDER, FOR SOLUTION INTRAMUSCULAR; INTRAVENOUS at 02:02

## 2022-02-02 RX ADMIN — IPRATROPIUM BROMIDE AND ALBUTEROL SULFATE 3 ML: 2.5; .5 SOLUTION RESPIRATORY (INHALATION) at 01:02

## 2022-02-02 RX ADMIN — PANTOPRAZOLE SODIUM 40 MG: 40 TABLET, DELAYED RELEASE ORAL at 11:02

## 2022-02-02 RX ADMIN — MELATONIN TAB 3 MG 6 MG: 3 TAB at 09:02

## 2022-02-02 RX ADMIN — IPRATROPIUM BROMIDE AND ALBUTEROL SULFATE 3 ML: 2.5; .5 SOLUTION RESPIRATORY (INHALATION) at 08:02

## 2022-02-02 RX ADMIN — AMPICILLIN 2 G: 2 INJECTION, POWDER, FOR SOLUTION INTRAMUSCULAR; INTRAVENOUS at 03:02

## 2022-02-02 RX ADMIN — AMPICILLIN 2 G: 2 INJECTION, POWDER, FOR SOLUTION INTRAMUSCULAR; INTRAVENOUS at 10:02

## 2022-02-02 RX ADMIN — APIXABAN 5 MG: 5 TABLET, FILM COATED ORAL at 09:02

## 2022-02-02 RX ADMIN — FLUCONAZOLE 100 MG: 100 TABLET ORAL at 10:02

## 2022-02-02 RX ADMIN — GUAIFENESIN AND DEXTROMETHORPHAN HYDROBROMIDE 1 TABLET: 600; 30 TABLET, EXTENDED RELEASE ORAL at 09:02

## 2022-02-02 RX ADMIN — AMPICILLIN 2 G: 2 INJECTION, POWDER, FOR SOLUTION INTRAMUSCULAR; INTRAVENOUS at 06:02

## 2022-02-02 RX ADMIN — IPRATROPIUM BROMIDE AND ALBUTEROL SULFATE 3 ML: 2.5; .5 SOLUTION RESPIRATORY (INHALATION) at 07:02

## 2022-02-02 RX ADMIN — APIXABAN 5 MG: 5 TABLET, FILM COATED ORAL at 10:02

## 2022-02-02 RX ADMIN — ACETAMINOPHEN 650 MG: 325 TABLET ORAL at 09:02

## 2022-02-02 RX ADMIN — GUAIFENESIN AND DEXTROMETHORPHAN HYDROBROMIDE 1 TABLET: 600; 30 TABLET, EXTENDED RELEASE ORAL at 10:02

## 2022-02-02 RX ADMIN — FUROSEMIDE 40 MG: 40 TABLET ORAL at 10:02

## 2022-02-02 RX ADMIN — PREDNISONE 5 MG: 5 TABLET ORAL at 10:02

## 2022-02-02 NOTE — PLAN OF CARE
Problem: Infection  Goal: Absence of Infection Signs and Symptoms  Outcome: Ongoing, Progressing  Intervention: Prevent or Manage Infection  Flowsheets (Taken 2/2/2022 1653)  Infection Management: aseptic technique maintained  Isolation Precautions: precautions maintained     Problem: Fall Injury Risk  Goal: Absence of Fall and Fall-Related Injury  Outcome: Ongoing, Progressing  Intervention: Promote Injury-Free Environment  Flowsheets (Taken 2/2/2022 1653)  Safety Promotion/Fall Prevention:   assistive device/personal item within reach   side rails raised x 2   Fall Risk reviewed with patient/family     Safety maintained. VSS. Q2 turns. No complaints of pain. Pt had bowel movement. Placed purewick to obtain accurate output.

## 2022-02-02 NOTE — PROGRESS NOTES
[facility] - Telemetry [facility] Medicine  Progress Note    Patient Name: Selma Lux  MRN: [id number]  Patient Class: IP- Inpatient   Admission Date: 1/23/2022  Length of Stay: 10 days  Attending Physician: Jovana Sahni MD  Primary Care Provider: Bhargav Hirsch MD        Subjective:     Principal Problem:Acute hypoxemic respiratory failure        HPI:  Selma Lux is a 84 y.o. 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  She has had encephalopathy during her hospital stay and admission to rehab. She tested positive for COVID yesterday.  Paramedics state that she has been having very poor oral intake.      Collateral information from her daughter.  She notes that she has had difficulty breathing for several months.  She states she has cryptogenic pneumonia and received steroids and breathing treatments for this. 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  She noted that she had been on the ventilator and really hated being on the ventilator.  However, she would want to be intubated if her life depended on it.  She would want to try everything possible to avoid intubation.  She noted that she tested positive for COVID yesterday along with her mother.  She states that she is not able to come see her mother due to coronavirus.  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     ED course: Patient with positive COVID test and respiratory distress. This seems to be a recurrent  problem with her history of cryptogenic organizing pneumonia. Prior chest x-rays seems to have had a similar appearance to the x-ray from today.  Diffuse infiltrates.  This resolved on recent chest x-rays from her admission.  Of note this recent admission is under a different registration under the same name in epic.   Her venous blood gas does not show respiratory failure or CO2 retention. She seems to have encephalopathy but this has been an ongoing issue. Her sodium today is normalized. Patient has markedly infected urine and an elevated white count.  ED started broad-spectrum IV antibiotics.      Overview/Hospital Course:  Selma Lux is a 84 y.o. 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  She has had encephalopathy during her hospital stay and admission to rehab. She tested positive for COVID on 1/23.  Paramedics state that she has been having very poor oral intake. Patient was admitted to ICU for hypotension and respiratory distress, anticipating rapid deterioration 2/2 her medical history. Patient is on 4L NC, SpO2 stable, she had episode of hypotensions but not sustained, no pressor indicated. Mentation improved in the morning. Patient required sedation for procedure. Family visit seemed to calm her down. Patient no longer required Precedex gtt. Qtc prolongation resolved, likely 2/2 to chronic plaquenil. Patient sat well on room air. Patient ready to transfer to lower level of care unit. ID consulted. Vanc and rocephin Dc'd 01/28. Transitioned to amp- completing 14d course. Found to have thrush w/up-trending WBC. Started on fluconazole  01/30. Improvement in leukocytosis. Deemed stable for discharge to rehab 01/31.       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 Pt w/cough is still present- starting on dex-guaifenesin today. No nausea, vomiting, chills, dysuria, hematuria, abd pain, constipation.    Objective:     Vital Signs (Most Recent):  Temp: 98.3 °F (36.8 °C) (02/02/22 1548)  Pulse: 96 (02/02/22 1548)  Resp: (!) 26 (02/02/22 1548)  BP: 124/78 (02/02/22 1548)  SpO2: 100 % (02/02/22 1312) Vital Signs (24h Range):  Temp:  [98.3 °F (36.8 °C)-99.1 °F (37.3 °C)] 98.3 °F (36.8 °C)  Pulse:  [71-96] 96  Resp:  [16-26] 26  SpO2:  [95 %-100 %] 100 %  BP: (123-165)/(56-78) 124/78     Weight: 68.5 kg (151 lb 0.2 oz)  Body mass index is 25.92 kg/m².    Intake/Output Summary (Last 24 hours) at 2/2/2022 1616  Last data filed at 2/2/2022 0928  Gross per 24 hour   Intake 940 ml   Output --   Net 940 ml     Physical Exam  Gen: in NAD, appears stated age, appears comfortable  Neuro: AAOx3, motor, sensory, and strength grossly intact BL  HEENT: NTNC, EOMI, PERRL, MMM- resolution of white plaques w/in oropharynx   CVS: RRR, no m/r/g; S1/S2 auscultated with no S3 or S4; capillary refill < 2 sec  Resp:productive cough, transmitted upper respiratory sounds, no wheezes or focal crackles, no belabored breathing or accessory muscle use appreciated   Abd: BS+ in all 4 quadrants; NTND, soft to palpation; no organomegaly appreciated  Extrem: pulses full, equal, and regular over all 4 extremities; edema of RUE-improved, no edema of other ext    Significant Labs:   All pertinent labs within the past 24 hours have been reviewed.  Blood Culture:   No results for input(s): LABBLOO in the last 48 hours.  CBC:   Recent Labs   Lab 02/02/22 0438   WBC 15.13*   HGB 8.1*   HCT 26.9*        CMP:   Recent Labs   Lab 02/01/22  0537 02/02/22 0438    131*   K 4.2 3.9    98   CO2 24 25    181*   BUN 15 17   CREATININE 0.7 0.7   CALCIUM 8.1* 7.8*    PROT 5.0* 4.8*   ALBUMIN 2.2* 2.2*   BILITOT 0.9 0.8   ALKPHOS 81 93   AST 17 21   ALT 20 25   ANIONGAP 8 8   EGFRNONAA >60.0 >60.0     Urine Culture: No results for input(s): LABURIN in the last 48 hours.    Significant Imaging: I have reviewed all pertinent imaging results/findings within the past 24 hours.     TTE 01/27:  · The left ventricle is normal in size with concentric remodeling and normal systolic function. The estimated ejection fraction is 55%.  · Normal right ventricular size with normal right ventricular systolic function.  · Normal left ventricular diastolic function.  · Mild mitral regurgitation.  · No evidence of intracardiac vegetation    CXR:  FINDINGS:  Cardiac silhouette is unchanged.  Similar prominence of the upper mediastinum in this patient known goiter.  Scattered interstitial and airspace opacity with slight clearing at the right upper lobe and left base.  No significant pleural effusion or gross pneumothorax.  Advanced glenohumeral DJD.     Impression:     As above.      Assessment/Plan:      * Acute hypoxemic respiratory failure  - Resolved- appeared to be 2/2 CHF, COPD and Interstitial lung disease + COVID-19 infection  - Stable on room air    Enterococcal bacteremia  - BCx 01/23- Enterococcus faecalis  - Repeat BCx 01/24- contaminant, otherwise NGTD  - Appreciate ID recs: 14d total course of antibiotics- EOT 02/07/22  - Continue amp 2g q4h  - Obtain weekly outpatient laboratory on Monday or Tuesday while on IV antibiotics: CBC, CMP      Fax laboratory results to [facility] at [phone] with attn: Dr. Porter     Outpatient Infectious Diseases clinic follow up will be arranged and found in patient calendar.     Prior to discharge, please ensure the patient's follow-up has been scheduled.  If there is still no follow-up scheduled in Infectious Diseases clinic, please send an EPIC message to Gisele Smith in Infectious Diseases.    Leukocytosis  - Improving- likely 2/2 thrush-  continuing w/fluconazole  - WBC improving 15  - Repeat BCx NGTG  - Resp Cx w/Candida albicans       Thrush  - white plaques present w/in oropharynx initially- resolving   - no dysphagia or odoynophagia  - no concern for esophageal thrush  - continue treatment w/fluconazole for 14 days total- EOT 02/13    UTI (urinary tract infection)  - UCx w/Ecoli 01/23- continue amp  - EOT 02/07/22      COVID-19 virus infection  - Anticoagulation: eliquis  - Remdesivir x 5 days (1/23), holding plaquenil (QT prolongation and negative effect to remdesivir)   - Holding cellcept  - Dexamethasone 6mg x 10 days (1/23)-completion of dex 02/01- re-start prednisone today  - on room air  - Continuous pulse ox  - Airborne + contact precautions    Immunocompromised state due to drug therapy  - Patient is on chronic prednisone for her , on Plaquenil and Cellcept for RA.   - Historically prescribe bactrim for PCP prophylaxis  - Dex completed 02/01- resume prednisone today- 5mg qday  - holding Plaquenil and cellcept    COPD exacerbation  - resolved  - continue duoneb q6h for sob/wheeze      Chronic systolic (congestive) heart failure  - TTE 02/27/20:  Grade 1 diastolic dysfunction, EF 55%  - No exacerbation at this time  - Stable, continue GDMT    Multinodular goiter  - Enlarged thyroid gland resulting in leftward deviation and mass effect upon the airway => anticipate difficult airway access   - Prior consultations with surgery, patient lost following up due to multiple recent medical events     AF (paroxysmal atrial fibrillation)  - Continue Eliquis  - Recent head bleed 12/13  - CHADSVASC of 7    Oropharyngeal dysphagia  - Evaluated by SLP- diet recommendations- dental soft, think liquids  - Aspiration precautions: 1 bite/sip at a time, Assistance with meals, Avoid talking while eating, Eliminate distractions, Feed only when awake/alert, HOB to 90 degrees, Meds whole 1 at a time, Remain upright 30 minutes post meal and Small  bites/sips        Chronic renal failure syndrome, stage 3 (moderate)  - stable renal function   - strict I and O  - Continue PO lasix to 40mg qday      Long QT interval  - last ecg 01/30- 446  - resolving   - avoid QT prolonging agents if possible   - likely 2/2 plaquenil use  - continue to monitor QTc      Hypertension, essential  - BP currently well-controlled  - Holding home regimen of amlodipine   - Will continue to monitor and further titrate antihypertensives as clinically indicated       PUD (peptic ulcer disease)  - continue PPI        VTE Risk Mitigation (From admission, onward)         Ordered     apixaban tablet 5 mg  2 times daily         01/30/22 1420     IP VTE HIGH RISK PATIENT  Once         01/23/22 1259     Place sequential compression device  Until discontinued         01/23/22 1259                Discharge Planning   LETICIA: 2/4/2022     Code Status: Full Code   Is the patient medically ready for discharge?: Yes    Reason for patient still in hospital (select all that apply): Patient trending condition  Discharge Plan A: Rehab   Discharge Delays: None known at this time                Jovana Sahni MD  Department of Hospital Medicine   [facility] - Telemetry Stepdown

## 2022-02-02 NOTE — ASSESSMENT & PLAN NOTE
- Anticoagulation: eliquis  - Remdesivir x 5 days (1/23), holding plaquenil (QT prolongation and negative effect to remdesivir)   - Holding cellcept  - Dexamethasone 6mg x 10 days (1/23)-completion of dex 02/01- re-start prednisone today  - on room air  - Continuous pulse ox  - Airborne + contact precautions

## 2022-02-02 NOTE — SUBJECTIVE & OBJECTIVE
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 Discussing w/CM/SW about placement today. Pt w/cough is still present. No nausea, vomiting, chills, dysuria, hematuria, abd pain, constipation.    Objective:     Vital Signs (Most Recent):  Temp: 99.1 °F (37.3 °C) (02/01/22 1945)  Pulse: 92 (02/01/22 2039)  Resp: 20 (02/01/22 2039)  BP: (!) 123/56 (02/01/22 1945)  SpO2: 97 % (02/01/22 2039) Vital Signs (24h Range):  Temp:  [96.8 °F (36 °C)-99.1 °F (37.3 °C)] 99.1 °F (37.3 °C)  Pulse:  [66-96] 92  Resp:  [16-20] 20  SpO2:  [95 %-100 %] 97 %  BP: (122-143)/(56-78) 123/56     Weight: 68.5 kg (151 lb 0.2 oz)  Body mass index is 25.92 kg/m².    Intake/Output Summary (Last 24 hours) at 2/1/2022 2132  Last data filed at 2/1/2022 1156  Gross per 24 hour   Intake 1659.95 ml   Output --   Net 1659.95 ml     Physical Exam  Gen: in NAD, appears stated age, appears comfortable  Neuro: AAOx3, motor, sensory, and strength grossly intact BL  HEENT: NTNC, EOMI, PERRL, MMM- resolution of white plaques w/in oropharynx   CVS: RRR, no m/r/g; S1/S2 auscultated with no S3 or S4; capillary refill < 2 sec  Resp:productive cough, transmitted upper respiratory sounds, no wheezes or focal crackles, no belabored breathing or accessory muscle use appreciated   Abd: BS+ in all 4 quadrants; NTND, soft to palpation; no organomegaly appreciated  Extrem: pulses full, equal, and regular over all 4 extremities; edema of RUE, no edema of other ext    Significant Labs:   All pertinent labs within the past 24 hours have been reviewed.  Blood Culture:   No results for input(s): LABBLOO in the last 48 hours.  CBC:   Recent Labs   Lab 01/31/22  1154   WBC 19.75*   HGB 8.1*   HCT 26.4*        CMP:   Recent Labs   Lab 01/31/22  0444 02/01/22  0537   * 136   K 4.3 4.2    104   CO2 20* 24   * 104   BUN 19 15   CREATININE 0.8 0.7   CALCIUM 7.8* 8.1*   PROT 4.7* 5.0*   ALBUMIN 2.1* 2.2*   BILITOT 0.6 0.9   ALKPHOS 87 81   AST 21  17   ALT 22 20   ANIONGAP 9 8   EGFRNONAA >60.0 >60.0     Urine Culture: No results for input(s): LABURIN in the last 48 hours.    Significant Imaging: I have reviewed all pertinent imaging results/findings within the past 24 hours.     TTE 01/27:  · The left ventricle is normal in size with concentric remodeling and normal systolic function. The estimated ejection fraction is 55%.  · Normal right ventricular size with normal right ventricular systolic function.  · Normal left ventricular diastolic function.  · Mild mitral regurgitation.  · No evidence of intracardiac vegetation    CXR:  FINDINGS:  Cardiac silhouette is unchanged.  Similar prominence of the upper mediastinum in this patient known goiter.  Scattered interstitial and airspace opacity with slight clearing at the right upper lobe and left base.  No significant pleural effusion or gross pneumothorax.  Advanced glenohumeral DJD.     Impression:     As above.

## 2022-02-02 NOTE — PLAN OF CARE
02/02/22 1016   Post-Acute Status   Post-Acute Authorization Placement       SW spoke with patient's daughter Rosy Rosado (Connie) [phone], and daughter reports she's in agreement with [facility]. Patient's daughter will be here after 1:00 pm and awaiting to here back from someone at [facility] regarding receiving proper care of her mother (ADL's, bath brushing teeth, etc.. ) She reports that she's not asking for a sitter just for someone to assure her mother will receive basic care before transferring to [facility]. SW will continue to follow patient.      Adelaide Dunham LMSW  PRN - [facility]  [id number]

## 2022-02-02 NOTE — ASSESSMENT & PLAN NOTE
- Improving- likely 2/2 thrush- continuing w/fluconazole  - CBC repeat tomorrow   - Repeat BCx NGTG  - Resp Cx w/Candida albicans

## 2022-02-02 NOTE — ASSESSMENT & PLAN NOTE
- BCx 01/23- Enterococcus faecalis  - Repeat BCx 01/24- contaminant, otherwise NGTD  - Appreciate ID recs: 14d total course of antibiotics- EOT 02/07/22  - Continue amp 2g q4h  - Obtain weekly outpatient laboratory on Monday or Tuesday while on IV antibiotics: CBC, CMP      Fax laboratory results to [facility] at [phone] with attn: Dr. Porter     Outpatient Infectious Diseases clinic follow up will be arranged and found in patient calendar.     Prior to discharge, please ensure the patient's follow-up has been scheduled.  If there is still no follow-up scheduled in Infectious Diseases clinic, please send an EPIC message to Gisele Smith in Infectious Diseases.

## 2022-02-02 NOTE — ASSESSMENT & PLAN NOTE
- BCx 01/23- Enterococcus faecalis  - Repeat BCx 01/24- contaminant, otherwise NGTD  - Appreciate ID recs: 14d total course of antibiotics- EOT 02/07/22  - Continue amp 2g q4h  - Obtain weekly outpatient laboratory on Monday or Tuesday while on IV antibiotics: CBC, CMP      Fax laboratory results to [facility] at [phone] with attn: Dr. Porter     Outpatient Infectious Diseases clinic follow up will be arranged and found in patient calendar.     Prior to discharge, please ensure the patient's follow-up has been scheduled.  If there is still no follow-up scheduled in Infectious Diseases clinic, please send an EPIC message to Gisele Smith in Infectious Diseases.

## 2022-02-02 NOTE — ASSESSMENT & PLAN NOTE
- last ecg 01/30- 446  - resolving   - avoid QT prolonging agents if possible   - likely 2/2 plaquenil use  - continue to monitor QTc

## 2022-02-02 NOTE — ASSESSMENT & PLAN NOTE
- Improving- likely 2/2 thrush- continuing w/fluconazole  - WBC improving 15  - Repeat BCx NGTG  - Resp Cx w/Candida albicans

## 2022-02-02 NOTE — ASSESSMENT & PLAN NOTE
- white plaques present w/in oropharynx initially- resolving   - no dysphagia or odoynophagia  - no concern for esophageal thrush  - continue treatment w/fluconazole for 14 days total- EOT 02/13

## 2022-02-02 NOTE — SUBJECTIVE & OBJECTIVE
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 Pt w/cough is still present- starting on dex-guaifenesin today. No nausea, vomiting, chills, dysuria, hematuria, abd pain, constipation.    Objective:     Vital Signs (Most Recent):  Temp: 98.3 °F (36.8 °C) (02/02/22 1548)  Pulse: 96 (02/02/22 1548)  Resp: (!) 26 (02/02/22 1548)  BP: 124/78 (02/02/22 1548)  SpO2: 100 % (02/02/22 1312) Vital Signs (24h Range):  Temp:  [98.3 °F (36.8 °C)-99.1 °F (37.3 °C)] 98.3 °F (36.8 °C)  Pulse:  [71-96] 96  Resp:  [16-26] 26  SpO2:  [95 %-100 %] 100 %  BP: (123-165)/(56-78) 124/78     Weight: 68.5 kg (151 lb 0.2 oz)  Body mass index is 25.92 kg/m².    Intake/Output Summary (Last 24 hours) at 2/2/2022 1616  Last data filed at 2/2/2022 0928  Gross per 24 hour   Intake 940 ml   Output --   Net 940 ml     Physical Exam  Gen: in NAD, appears stated age, appears comfortable  Neuro: AAOx3, motor, sensory, and strength grossly intact BL  HEENT: NTNC, EOMI, PERRL, MMM- resolution of white plaques w/in oropharynx   CVS: RRR, no m/r/g; S1/S2 auscultated with no S3 or S4; capillary refill < 2 sec  Resp:productive cough, transmitted upper respiratory sounds, no wheezes or focal crackles, no belabored breathing or accessory muscle use appreciated   Abd: BS+ in all 4 quadrants; NTND, soft to palpation; no organomegaly appreciated  Extrem: pulses full, equal, and regular over all 4 extremities; edema of RUE-improved, no edema of other ext    Significant Labs:   All pertinent labs within the past 24 hours have been reviewed.  Blood Culture:   No results for input(s): LABBLOO in the last 48 hours.  CBC:   Recent Labs   Lab 02/02/22  0438   WBC 15.13*   HGB 8.1*   HCT 26.9*        CMP:   Recent Labs   Lab 02/01/22  0537 02/02/22  0438    131*   K 4.2 3.9    98   CO2 24 25    181*   BUN 15 17   CREATININE 0.7 0.7   CALCIUM 8.1* 7.8*   PROT 5.0* 4.8*   ALBUMIN 2.2* 2.2*   BILITOT 0.9 0.8   ALKPHOS 81 93   AST 17 21    ALT 20 25   ANIONGAP 8 8   EGFRNONAA >60.0 >60.0     Urine Culture: No results for input(s): LABURIN in the last 48 hours.    Significant Imaging: I have reviewed all pertinent imaging results/findings within the past 24 hours.     TTE 01/27:  · The left ventricle is normal in size with concentric remodeling and normal systolic function. The estimated ejection fraction is 55%.  · Normal right ventricular size with normal right ventricular systolic function.  · Normal left ventricular diastolic function.  · Mild mitral regurgitation.  · No evidence of intracardiac vegetation    CXR:  FINDINGS:  Cardiac silhouette is unchanged.  Similar prominence of the upper mediastinum in this patient known goiter.  Scattered interstitial and airspace opacity with slight clearing at the right upper lobe and left base.  No significant pleural effusion or gross pneumothorax.  Advanced glenohumeral DJD.     Impression:     As above.

## 2022-02-02 NOTE — PROGRESS NOTES
[facility] - Telemetry [facility] Medicine  Progress Note    Patient Name: Selma Lux  MRN: [id number]  Patient Class: IP- Inpatient   Admission Date: 1/23/2022  Length of Stay: 9 days  Attending Physician: Jovana Sahni MD  Primary Care Provider: Bhargav Hirsch MD        Subjective:     Principal Problem:Acute hypoxemic respiratory failure        HPI:  Selma Lux is a 84 y.o. 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  She has had encephalopathy during her hospital stay and admission to rehab. She tested positive for COVID yesterday.  Paramedics state that she has been having very poor oral intake.      Collateral information from her daughter.  She notes that she has had difficulty breathing for several months.  She states she has cryptogenic pneumonia and received steroids and breathing treatments for this. 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  She noted that she had been on the ventilator and really hated being on the ventilator.  However, she would want to be intubated if her life depended on it.  She would want to try everything possible to avoid intubation.  She noted that she tested positive for COVID yesterday along with her mother.  She states that she is not able to come see her mother due to coronavirus.  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     ED course: Patient with positive COVID test and respiratory distress. This seems to be a recurrent problem  with her history of cryptogenic organizing pneumonia. Prior chest x-rays seems to have had a similar appearance to the x-ray from today.  Diffuse infiltrates.  This resolved on recent chest x-rays from her admission.  Of note this recent admission is under a different registration under the same name in epic.   Her venous blood gas does not show respiratory failure or CO2 retention. She seems to have encephalopathy but this has been an ongoing issue. Her sodium today is normalized. Patient has markedly infected urine and an elevated white count.  ED started broad-spectrum IV antibiotics.      Overview/Hospital Course:  Selma Lux is a 84 y.o. 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  She has had encephalopathy during her hospital stay and admission to rehab. She tested positive for COVID on 1/23.  Paramedics state that she has been having very poor oral intake. Patient was admitted to ICU for hypotension and respiratory distress, anticipating rapid deterioration 2/2 her medical history. Patient is on 4L NC, SpO2 stable, she had episode of hypotensions but not sustained, no pressor indicated. Mentation improved in the morning. Patient required sedation for procedure. Family visit seemed to calm her down. Patient no longer required Precedex gtt. Qtc prolongation resolved, likely 2/2 to chronic plaquenil. Patient sat well on room air. Patient ready to transfer to lower level of care unit. ID consulted. Maricruz and rocephin Dc'd 01/28. Transitioned to amp- completing 14d course. Found to have thrush w/up-trending WBC. Started on fluconazole 01/30.  Improvement in leukocytosis. Deemed stable for discharge to rehab 01/31.       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 Discussing w/CM/SW about placement today. Pt w/cough is still present. No nausea, vomiting, chills, dysuria, hematuria, abd pain, constipation.    Objective:     Vital Signs (Most Recent):  Temp: 99.1 °F (37.3 °C) (02/01/22 1945)  Pulse: 92 (02/01/22 2039)  Resp: 20 (02/01/22 2039)  BP: (!) 123/56 (02/01/22 1945)  SpO2: 97 % (02/01/22 2039) Vital Signs (24h Range):  Temp:  [96.8 °F (36 °C)-99.1 °F (37.3 °C)] 99.1 °F (37.3 °C)  Pulse:  [66-96] 92  Resp:  [16-20] 20  SpO2:  [95 %-100 %] 97 %  BP: (122-143)/(56-78) 123/56     Weight: 68.5 kg (151 lb 0.2 oz)  Body mass index is 25.92 kg/m².    Intake/Output Summary (Last 24 hours) at 2/1/2022 2132  Last data filed at 2/1/2022 1156  Gross per 24 hour   Intake 1659.95 ml   Output --   Net 1659.95 ml     Physical Exam  Gen: in NAD, appears stated age, appears comfortable  Neuro: AAOx3, motor, sensory, and strength grossly intact BL  HEENT: NTNC, EOMI, PERRL, MMM- resolution of white plaques w/in oropharynx   CVS: RRR, no m/r/g; S1/S2 auscultated with no S3 or S4; capillary refill < 2 sec  Resp:productive cough, transmitted upper respiratory sounds, no wheezes or focal crackles, no belabored breathing or accessory muscle use appreciated   Abd: BS+ in all 4 quadrants; NTND, soft to palpation; no organomegaly appreciated  Extrem: pulses full, equal, and regular over all 4 extremities; edema of RUE, no edema of other ext    Significant Labs:   All pertinent labs within the past 24 hours have been reviewed.  Blood Culture:   No results for input(s): LABBLOO in the last 48 hours.  CBC:   Recent Labs   Lab 01/31/22  1154   WBC 19.75*   HGB 8.1*   HCT 26.4*        CMP:   Recent Labs   Lab 01/31/22  0444 02/01/22  0537   * 136   K 4.3 4.2    104   CO2 20* 24   * 104   BUN 19 15   CREATININE 0.8 0.7   CALCIUM 7.8* 8.1*    PROT 4.7* 5.0*   ALBUMIN 2.1* 2.2*   BILITOT 0.6 0.9   ALKPHOS 87 81   AST 21 17   ALT 22 20   ANIONGAP 9 8   EGFRNONAA >60.0 >60.0     Urine Culture: No results for input(s): LABURIN in the last 48 hours.    Significant Imaging: I have reviewed all pertinent imaging results/findings within the past 24 hours.     TTE 01/27:  · The left ventricle is normal in size with concentric remodeling and normal systolic function. The estimated ejection fraction is 55%.  · Normal right ventricular size with normal right ventricular systolic function.  · Normal left ventricular diastolic function.  · Mild mitral regurgitation.  · No evidence of intracardiac vegetation    CXR:  FINDINGS:  Cardiac silhouette is unchanged.  Similar prominence of the upper mediastinum in this patient known goiter.  Scattered interstitial and airspace opacity with slight clearing at the right upper lobe and left base.  No significant pleural effusion or gross pneumothorax.  Advanced glenohumeral DJD.     Impression:     As above.      Assessment/Plan:      * Acute hypoxemic respiratory failure  - Resolved- appeared to be 2/2 CHF, COPD and Interstitial lung disease + COVID-19 infection  - Stable on room air    Enterococcal bacteremia  - BCx 01/23- Enterococcus faecalis  - Repeat BCx 01/24- contaminant, otherwise NGTD  - Appreciate ID recs: 14d total course of antibiotics- EOT 02/07/22  - Continue amp 2g q4h  - Obtain weekly outpatient laboratory on Monday or Tuesday while on IV antibiotics: CBC, CMP      Fax laboratory results to [facility] at [phone] with attn: Dr. Porter     Outpatient Infectious Diseases clinic follow up will be arranged and found in patient calendar.     Prior to discharge, please ensure the patient's follow-up has been scheduled.  If there is still no follow-up scheduled in Infectious Diseases clinic, please send an EPIC message to Gisele Smith in Infectious Diseases.    Leukocytosis  - Improving- likely 2/2 thrush-  continuing w/fluconazole  - CBC repeat tomorrow   - Repeat BCx NGTG  - Resp Cx w/Candida albicans       Thrush  - white plaques present w/in oropharynx initially- resolving   - no dysphagia or odoynophagia  - no concern for esophageal thrush  - continue treatment w/fluconazole for 14 days total- EOT 02/13    UTI (urinary tract infection)  - UCx w/Ecoli 01/23- continue amp  - EOT 02/07/22      COVID-19 virus infection  - Anticoagulation: eliquis  - Remdesivir x 5 days (1/23), holding plaquenil (QT prolongation and negative effect to remdesivir)   - Holding cellcept  - Dexamethasone 6mg x 10 days (1/23)-completion of dex today- will resume prednisone tomorrow   - on room air  - Continuous pulse ox  - Airborne + contact precautions    Immunocompromised state due to drug therapy  - Patient is on chronic prednisone for her , on Plaquenil and Cellcept for RA.   - Historically prescribe bactrim for PCP prophylaxis  - Holding prednisone as currently administering dex  - holding Plaquenil and cellcept as well    COPD exacerbation  - resolved  - continue duoneb q6h for sob/wheeze      Chronic systolic (congestive) heart failure  - TTE 02/27/20:  Grade 1 diastolic dysfunction, EF 55%  - No exacerbation at this time  - Stable, continue GDMT    Multinodular goiter  - Enlarged thyroid gland resulting in leftward deviation and mass effect upon the airway => anticipate difficult airway access   - Prior consultations with surgery, patient lost following up due to multiple recent medical events     AF (paroxysmal atrial fibrillation)  - Continue Eliquis  - Recent head bleed 12/13  - CHADSVASC of 7    Oropharyngeal dysphagia  - Evaluated by SLP- diet recommendations- dental soft, think liquids  - Aspiration precautions: 1 bite/sip at a time, Assistance with meals, Avoid talking while eating, Eliminate distractions, Feed only when awake/alert, HOB to 90 degrees, Meds whole 1 at a time, Remain upright 30 minutes post meal and Small  bites/sips        Chronic renal failure syndrome, stage 3 (moderate)  - stable renal function   - strict I and O  - Continue PO lasix to 40mg qday      Long QT interval  - last ecg 01/30- 446  - resolving   - avoid QT prolonging agents if possible   - likely 2/2 plaquenil use  - continue to monitor QTc      Hypertension, essential  - BP currently well-controlled  - Holding home regimen of amlodipine   - Will continue to monitor and further titrate antihypertensives as clinically indicated       PUD (peptic ulcer disease)  - continue PPI        VTE Risk Mitigation (From admission, onward)         Ordered     apixaban tablet 5 mg  2 times daily         01/30/22 1420     IP VTE HIGH RISK PATIENT  Once         01/23/22 1259     Place sequential compression device  Until discontinued         01/23/22 1259                Discharge Planning   LETICIA: 2/1/2022     Code Status: Full Code   Is the patient medically ready for discharge?: Yes    Reason for patient still in hospital (select all that apply): Patient trending condition  Discharge Plan A: Rehab   Discharge Delays: None known at this time                Jovana Sahni MD  Department of Hospital Medicine   [facility] - Telemetry Stepdown

## 2022-02-02 NOTE — ASSESSMENT & PLAN NOTE
- Anticoagulation: eliquis  - Remdesivir x 5 days (1/23), holding plaquenil (QT prolongation and negative effect to remdesivir)   - Holding cellcept  - Dexamethasone 6mg x 10 days (1/23)-completion of dex today- will resume prednisone tomorrow   - on room air  - Continuous pulse ox  - Airborne + contact precautions

## 2022-02-02 NOTE — PLAN OF CARE
[facility]    FACILITY TRANSFER ORDERS      Patient Name: Selma Lux  YOB: 1935    PCP: Bhargav Hirsch MD   PCP Address: [address] [place] [address] / [place] [address]  PCP Phone Number: [phone]  PCP Fax: [phone]    Encounter Date: 02/08/2022    Admit to: Inpatient Rehab    Vital Signs:  Routine    Diagnoses:   Active Hospital Problems    Diagnosis  POA    *Acute hypoxemic respiratory failure [J96.01]  Yes    Serum total bilirubin elevated [R17]  Yes    Moderate malnutrition [E44.0]  Yes    Thrush [B37.0]  Yes    Enterococcal bacteremia [R78.81, B95.2]  No    COVID-19 virus infection [U07.1]  Yes    COPD exacerbation [J44.1]  Yes    Immunocompromised state due to drug therapy [D84.821, Z79.899]  Not Applicable    Chronic systolic (congestive) heart failure [I50.22]  Yes     Last Assessment & Plan:   Formatting of this note might be different from the original.  -last ANTOLIN was done on 03/01/2020:  Grade 1 mild left ventricular diastolic dysfunction      Leukocytosis [D72.829]  Yes    Multinodular goiter [E04.2]  Yes    UTI (urinary tract infection) [N39.0]  Yes    AF (paroxysmal atrial fibrillation) [I48.0]  Yes    Oropharyngeal dysphagia [R13.12]  Yes    Chronic renal failure syndrome, stage 3 (moderate) [N18.30]  Yes    Long QT interval [R94.31]  Yes     Due to medication (plaquenil)       Hypertension, essential [I10]  Yes    PUD (peptic ulcer disease) [K27.9]  Yes      Resolved Hospital Problems   No resolved problems to display.       Allergies:Review of patient's allergies indicates:  No Known Allergies    Diet: dysphagia diet- mechanical soft    Activities: Activity as tolerated    Nursing: Vitals qshift      Labs: CBC and CMP once qweek for 2 weeks      CONSULTS:    Physical Therapy to evaluate and treat. , Occupational Therapy to evaluate and treat. and Speech Therapy to evaluate and treat for Swallowing.    MISCELLANEOUS CARE:  Routine Skin  for Bedridden Patients: Apply moisture barrier cream to all skin folds and wet areas in perineal area daily and after baths and all bowel movements.      WOUND CARE ORDERS  Yes: Pressure Ulcer(s) Stage II :   Location: buttock  Apply Miconzazole:  2% powder and Barrier ointment                       Frequency:  Daily                             If incontinent of stool or urine, apply thin layer Barrier cream                   twice daily and PRN to wound         Pressure relief measure:  for pressure redistribution and A/C Boots              Medications: Review discharge medications with patient and family and provide education.      Current Discharge Medication List      START taking these medications    Details   dextromethorphan-guaiFENesin  mg (MUCINEX DM)  mg per 12 hr tablet Take 1 tablet by mouth 2 (two) times daily. for 10 days  Qty: 20 tablet, Refills: 0         CONTINUE these medications which have CHANGED    Details   furosemide (LASIX) 40 MG tablet Take 1 tablet (40 mg total) by mouth every other day. TAKE 1 TABLET(40 MG) BY MOUTH DAILY  Qty: 90 tablet, Refills: 3      hydrOXYchloroQUINE (PLAQUENIL) 200 mg tablet Take 1 tablet (200 mg total) by mouth 2 (two) times daily.  Qty: 180 tablet, Refills: 11    Comments: Resume 02/12/22- 20d following covid infection  Associated Diagnoses: Seropositive rheumatoid arthritis of multiple sites      mycophenolate (CELLCEPT) 500 mg Tab Take 1 tablet (500 mg total) by mouth 2 (two) times daily. Resume 02/12/22- 20d following covid infection  Qty: 180 tablet, Refills: 0    Comments: Resume 02/12/22- 20d following covid infection  Associated Diagnoses: Cryptogenic organizing pneumonia      predniSONE (DELTASONE) 5 MG tablet Take 1 tablet (5 mg total) by mouth once daily.  Qty: 30 tablet, Refills: 2    Associated Diagnoses: Cryptogenic organizing pneumonia         CONTINUE these medications which have NOT CHANGED    Details   albuterol  (PROVENTIL/VENTOLIN HFA) 90 mcg/actuation inhaler Inhale 2 puffs into the lungs every 6 (six) hours as needed for Wheezing. Rescue  Qty: 18 g, Refills: 3      apixaban (ELIQUIS) 5 mg Tab Take 1 tablet (5 mg total) by mouth 2 (two) times daily.  Qty: 120 tablet, Refills: 5      atorvastatin (LIPITOR) 40 MG tablet Take 1 tablet (40 mg total) by mouth once daily.  Qty: 90 tablet, Refills: 3      baclofen (LIORESAL) 10 MG tablet Take 0.5-1 tablets (5-10 mg total) by mouth 3 (three) times daily as needed.  Qty: 90 tablet, Refills: 2      gabapentin (NEURONTIN) 300 MG capsule Take 1 capsule (300 mg total) by mouth every evening.  Qty: 90 capsule, Refills: 2      magnesium oxide (MAG-OX) 400 mg (241.3 mg magnesium) tablet Take 400 mg by mouth once daily.      melatonin (MELATIN) 3 mg tablet Take 2 tablets (6 mg total) by mouth nightly as needed for Insomnia.  Refills: 0      montelukast (SINGULAIR) 10 mg tablet Take 1 tablet (10 mg total) by mouth every evening.  Qty: 90 tablet, Refills: 0    Comments: Please inactivate all prior scripts with same name and strength including on holds.      omeprazole (PRILOSEC) 20 MG capsule Take 1 capsule (20 mg total) by mouth once daily.  Qty: 90 capsule, Refills: 3    Comments: Please inactivate all prior scripts with same name and strength including on holds.      polyethylene glycol (GLYCOLAX) 17 gram PwPk Take 17 g by mouth daily as needed.  Refills: 0      sumatriptan (IMITREX) 50 MG tablet Take 1 tablet (50 mg total) by mouth every 6 (six) hours as needed for Migraine.  Qty: 30 tablet, Refills: 1      thiamine 100 MG tablet Take 100 mg by mouth once daily.      traMADoL (ULTRAM) 50 mg tablet Take 1 tablet (50 mg total) by mouth every 6 (six) hours as needed for Pain.  Qty: 90 tablet, Refills: 0    Comments: Quantity prescribed more than 7 day supply? Yes, quantity medically necessary  Associated Diagnoses: Rheumatoid arthritis involving multiple joints         STOP taking these  medications       amLODIPine (NORVASC) 10 MG tablet Comments:   Reason for Stopping:         ciclopirox (PENLAC) 8 % Soln Comments:   Reason for Stopping:         LIDOcaine (LIDODERM) 5 % Comments:   Reason for Stopping:         phenazopyridine (PYRIDIUM) 100 MG tablet Comments:   Reason for Stopping:         sulfamethoxazole-trimethoprim 800-160mg (BACTRIM DS) 800-160 mg Tab Comments:   Reason for Stopping:                  Immunizations Administered as of 2/8/2022     Name Date Dose VIS Date Route Exp Date    COVID-19, MRNA, LN-S, PF (Pfizer) (Purple Cap) 9/10/2021 10:14 AM 0.3 mL 12/12/2020 Intramuscular 10/31/2021    Site: Left deltoid     Given By: Cely Valencia RN     : [employer]     Lot: [id number]     COVID-19, MRNA, LN-S, PF (Pfizer) (Purple Cap) 1/30/2021  2:24 PM 0.3 mL 12/12/2020 Intramuscular 5/31/2021    Site: Right deltoid     Given By: Jonah Pierre MA     : [employer]     Lot: [id number]     COVID-19, MRNA, LN-S, PF (Pfizer) (Purple Cap) 1/9/2021  1:40 PM 0.3 mL 12/12/2020 Intramuscular 4/30/2021    Site: Left arm     Given By: Isaac Rubio RN     : [employer]     Lot: [id number]           This patient has had both covid vaccinations    Some patients may experience side effects after vaccination.  These may include fever, headache, muscle or joint aches.  Most symptoms resolve with 24-48 hours and do not require urgent medical evaluation unless they persist for more than 72 hours or symptoms are concerning for an unrelated medical condition.            _________________________________  Kizzy Sandoval MD  02/08/2022

## 2022-02-02 NOTE — ASSESSMENT & PLAN NOTE
- Patient is on chronic prednisone for her , on Plaquenil and Cellcept for RA.   - Historically prescribe bactrim for PCP prophylaxis  - Dex completed 02/01- resume prednisone today- 5mg qday  - holding Plaquenil and cellcept

## 2022-02-03 LAB
ALBUMIN SERPL BCP-MCNC: 2.1 G/DL (ref 3.5–5.2)
ALP SERPL-CCNC: 77 U/L (ref 55–135)
ALT SERPL W/O P-5'-P-CCNC: 26 U/L (ref 10–44)
ANION GAP SERPL CALC-SCNC: 5 MMOL/L (ref 8–16)
AST SERPL-CCNC: 20 U/L (ref 10–40)
BACTERIA BLD CULT: NORMAL
BACTERIA BLD CULT: NORMAL
BILIRUB SERPL-MCNC: 0.9 MG/DL (ref 0.1–1)
BUN SERPL-MCNC: 16 MG/DL (ref 8–23)
CALCIUM SERPL-MCNC: 8 MG/DL (ref 8.7–10.5)
CHLORIDE SERPL-SCNC: 100 MMOL/L (ref 95–110)
CO2 SERPL-SCNC: 30 MMOL/L (ref 23–29)
CREAT SERPL-MCNC: 0.7 MG/DL (ref 0.5–1.4)
EST. GFR  (AFRICAN AMERICAN): >60 ML/MIN/1.73 M^2
EST. GFR  (NON AFRICAN AMERICAN): >60 ML/MIN/1.73 M^2
GLUCOSE SERPL-MCNC: 74 MG/DL (ref 70–110)
MAGNESIUM SERPL-MCNC: 1.6 MG/DL (ref 1.6–2.6)
PHOSPHATE SERPL-MCNC: 2.5 MG/DL (ref 2.7–4.5)
POTASSIUM SERPL-SCNC: 3.6 MMOL/L (ref 3.5–5.1)
PROT SERPL-MCNC: 4.9 G/DL (ref 6–8.4)
SODIUM SERPL-SCNC: 135 MMOL/L (ref 136–145)

## 2022-02-03 PROCEDURE — 27000207 HC ISOLATION

## 2022-02-03 PROCEDURE — 94640 AIRWAY INHALATION TREATMENT: CPT

## 2022-02-03 PROCEDURE — 25000003 PHARM REV CODE 250: Performed by: EMERGENCY MEDICINE

## 2022-02-03 PROCEDURE — 99900035 HC TECH TIME PER 15 MIN (STAT)

## 2022-02-03 PROCEDURE — 25000003 PHARM REV CODE 250: Performed by: STUDENT IN AN ORGANIZED HEALTH CARE EDUCATION/TRAINING PROGRAM

## 2022-02-03 PROCEDURE — 25000242 PHARM REV CODE 250 ALT 637 W/ HCPCS: Performed by: STUDENT IN AN ORGANIZED HEALTH CARE EDUCATION/TRAINING PROGRAM

## 2022-02-03 PROCEDURE — 20600001 HC STEP DOWN PRIVATE ROOM

## 2022-02-03 PROCEDURE — 83735 ASSAY OF MAGNESIUM: CPT | Performed by: EMERGENCY MEDICINE

## 2022-02-03 PROCEDURE — 25000003 PHARM REV CODE 250: Performed by: HOSPITALIST

## 2022-02-03 PROCEDURE — 63600175 PHARM REV CODE 636 W HCPCS: Performed by: STUDENT IN AN ORGANIZED HEALTH CARE EDUCATION/TRAINING PROGRAM

## 2022-02-03 PROCEDURE — 36415 COLL VENOUS BLD VENIPUNCTURE: CPT | Performed by: EMERGENCY MEDICINE

## 2022-02-03 PROCEDURE — 63700000 PHARM REV CODE 250 ALT 637 W/O HCPCS: Performed by: STUDENT IN AN ORGANIZED HEALTH CARE EDUCATION/TRAINING PROGRAM

## 2022-02-03 PROCEDURE — 80053 COMPREHEN METABOLIC PANEL: CPT | Performed by: EMERGENCY MEDICINE

## 2022-02-03 PROCEDURE — 94761 N-INVAS EAR/PLS OXIMETRY MLT: CPT

## 2022-02-03 PROCEDURE — 99232 PR SUBSEQUENT HOSPITAL CARE,LEVL II: ICD-10-PCS | Mod: ,,, | Performed by: STUDENT IN AN ORGANIZED HEALTH CARE EDUCATION/TRAINING PROGRAM

## 2022-02-03 PROCEDURE — 99232 SBSQ HOSP IP/OBS MODERATE 35: CPT | Mod: ,,, | Performed by: STUDENT IN AN ORGANIZED HEALTH CARE EDUCATION/TRAINING PROGRAM

## 2022-02-03 PROCEDURE — 84100 ASSAY OF PHOSPHORUS: CPT | Performed by: EMERGENCY MEDICINE

## 2022-02-03 RX ORDER — FUROSEMIDE 40 MG/1
40 TABLET ORAL EVERY OTHER DAY
Qty: 90 TABLET | Refills: 3
Start: 2022-02-03 | End: 2022-10-12

## 2022-02-03 RX ORDER — MYCOPHENOLATE MOFETIL 500 MG/1
500 TABLET ORAL 2 TIMES DAILY
Qty: 180 TABLET | Refills: 0
Start: 2022-02-12 | End: 2022-03-25

## 2022-02-03 RX ADMIN — GUAIFENESIN AND DEXTROMETHORPHAN HYDROBROMIDE 1 TABLET: 600; 30 TABLET, EXTENDED RELEASE ORAL at 09:02

## 2022-02-03 RX ADMIN — IPRATROPIUM BROMIDE AND ALBUTEROL SULFATE 3 ML: 2.5; .5 SOLUTION RESPIRATORY (INHALATION) at 08:02

## 2022-02-03 RX ADMIN — AMPICILLIN 2 G: 2 INJECTION, POWDER, FOR SOLUTION INTRAMUSCULAR; INTRAVENOUS at 03:02

## 2022-02-03 RX ADMIN — FLUCONAZOLE 100 MG: 100 TABLET ORAL at 08:02

## 2022-02-03 RX ADMIN — AMPICILLIN 2 G: 2 INJECTION, POWDER, FOR SOLUTION INTRAMUSCULAR; INTRAVENOUS at 11:02

## 2022-02-03 RX ADMIN — PREDNISONE 5 MG: 5 TABLET ORAL at 08:02

## 2022-02-03 RX ADMIN — APIXABAN 5 MG: 5 TABLET, FILM COATED ORAL at 09:02

## 2022-02-03 RX ADMIN — GUAIFENESIN AND DEXTROMETHORPHAN HYDROBROMIDE 1 TABLET: 600; 30 TABLET, EXTENDED RELEASE ORAL at 08:02

## 2022-02-03 RX ADMIN — AMPICILLIN 2 G: 2 INJECTION, POWDER, FOR SOLUTION INTRAMUSCULAR; INTRAVENOUS at 07:02

## 2022-02-03 RX ADMIN — APIXABAN 5 MG: 5 TABLET, FILM COATED ORAL at 08:02

## 2022-02-03 RX ADMIN — IPRATROPIUM BROMIDE AND ALBUTEROL SULFATE 3 ML: 2.5; .5 SOLUTION RESPIRATORY (INHALATION) at 02:02

## 2022-02-03 RX ADMIN — PANTOPRAZOLE SODIUM 40 MG: 40 TABLET, DELAYED RELEASE ORAL at 08:02

## 2022-02-03 RX ADMIN — FUROSEMIDE 40 MG: 40 TABLET ORAL at 08:02

## 2022-02-03 RX ADMIN — ACETAMINOPHEN 650 MG: 325 TABLET ORAL at 09:02

## 2022-02-03 RX ADMIN — MELATONIN TAB 3 MG 6 MG: 3 TAB at 09:02

## 2022-02-03 NOTE — CONSULTS
Thank you for your consult to [facility]. We have reviewed the patient chart. This patient does not meet criteria for [facility] service at this time due to [facility] service capacity limitations. Will hand back to In-house service.     Hayley Haney MD

## 2022-02-03 NOTE — PLAN OF CARE
02/03/22 1113   Post-Acute Status   Post-Acute Authorization Placement       SW faxed Facility Transfer Orders to [facility] via [facility] for review. SW will continue to follow patient.    4:40 PM  SW still await medically clearance from [facility]. SW will continue to follow patient.    Adelaide Dunham LMSW  PRN - [facility]  [phone]

## 2022-02-04 PROBLEM — E44.0 MODERATE MALNUTRITION: Status: ACTIVE | Noted: 2022-02-04

## 2022-02-04 LAB
ACANTHOCYTES BLD QL SMEAR: PRESENT
ALBUMIN SERPL BCP-MCNC: 2.3 G/DL (ref 3.5–5.2)
ALP SERPL-CCNC: 82 U/L (ref 55–135)
ALT SERPL W/O P-5'-P-CCNC: 25 U/L (ref 10–44)
ANION GAP SERPL CALC-SCNC: 8 MMOL/L (ref 8–16)
ANISOCYTOSIS BLD QL SMEAR: ABNORMAL
AST SERPL-CCNC: 16 U/L (ref 10–40)
BASOPHILS # BLD AUTO: 0.02 K/UL (ref 0–0.2)
BASOPHILS NFR BLD: 0.1 % (ref 0–1.9)
BILIRUB SERPL-MCNC: 1.2 MG/DL (ref 0.1–1)
BUN SERPL-MCNC: 14 MG/DL (ref 8–23)
BURR CELLS BLD QL SMEAR: ABNORMAL
CALCIUM SERPL-MCNC: 8 MG/DL (ref 8.7–10.5)
CHLORIDE SERPL-SCNC: 100 MMOL/L (ref 95–110)
CO2 SERPL-SCNC: 28 MMOL/L (ref 23–29)
CREAT SERPL-MCNC: 0.7 MG/DL (ref 0.5–1.4)
DIFFERENTIAL METHOD: ABNORMAL
EOSINOPHIL # BLD AUTO: 0.4 K/UL (ref 0–0.5)
EOSINOPHIL NFR BLD: 2.7 % (ref 0–8)
ERYTHROCYTE [DISTWIDTH] IN BLOOD BY AUTOMATED COUNT: 21.5 % (ref 11.5–14.5)
EST. GFR  (AFRICAN AMERICAN): >60 ML/MIN/1.73 M^2
EST. GFR  (NON AFRICAN AMERICAN): >60 ML/MIN/1.73 M^2
GLUCOSE SERPL-MCNC: 74 MG/DL (ref 70–110)
HCT VFR BLD AUTO: 31.5 % (ref 37–48.5)
HGB BLD-MCNC: 9.6 G/DL (ref 12–16)
IMM GRANULOCYTES # BLD AUTO: 0.25 K/UL (ref 0–0.04)
IMM GRANULOCYTES NFR BLD AUTO: 1.6 % (ref 0–0.5)
LYMPHOCYTES # BLD AUTO: 1.6 K/UL (ref 1–4.8)
LYMPHOCYTES NFR BLD: 10.4 % (ref 18–48)
MAGNESIUM SERPL-MCNC: 1.8 MG/DL (ref 1.6–2.6)
MCH RBC QN AUTO: 29 PG (ref 27–31)
MCHC RBC AUTO-ENTMCNC: 30.5 G/DL (ref 32–36)
MCV RBC AUTO: 95 FL (ref 82–98)
MONOCYTES # BLD AUTO: 2.6 K/UL (ref 0.3–1)
MONOCYTES NFR BLD: 16.5 % (ref 4–15)
NEUTROPHILS # BLD AUTO: 10.8 K/UL (ref 1.8–7.7)
NEUTROPHILS NFR BLD: 68.7 % (ref 38–73)
NRBC BLD-RTO: 0 /100 WBC
OVALOCYTES BLD QL SMEAR: ABNORMAL
PHOSPHATE SERPL-MCNC: 2.8 MG/DL (ref 2.7–4.5)
PLATELET # BLD AUTO: 259 K/UL (ref 150–450)
PMV BLD AUTO: 11.2 FL (ref 9.2–12.9)
POIKILOCYTOSIS BLD QL SMEAR: SLIGHT
POLYCHROMASIA BLD QL SMEAR: ABNORMAL
POTASSIUM SERPL-SCNC: 3.3 MMOL/L (ref 3.5–5.1)
PROT SERPL-MCNC: 5.3 G/DL (ref 6–8.4)
RBC # BLD AUTO: 3.31 M/UL (ref 4–5.4)
SCHISTOCYTES BLD QL SMEAR: ABNORMAL
SODIUM SERPL-SCNC: 136 MMOL/L (ref 136–145)
WBC # BLD AUTO: 15.66 K/UL (ref 3.9–12.7)

## 2022-02-04 PROCEDURE — 63700000 PHARM REV CODE 250 ALT 637 W/O HCPCS: Performed by: STUDENT IN AN ORGANIZED HEALTH CARE EDUCATION/TRAINING PROGRAM

## 2022-02-04 PROCEDURE — 27000207 HC ISOLATION

## 2022-02-04 PROCEDURE — 99232 PR SUBSEQUENT HOSPITAL CARE,LEVL II: ICD-10-PCS | Mod: ,,, | Performed by: STUDENT IN AN ORGANIZED HEALTH CARE EDUCATION/TRAINING PROGRAM

## 2022-02-04 PROCEDURE — 63600175 PHARM REV CODE 636 W HCPCS: Performed by: STUDENT IN AN ORGANIZED HEALTH CARE EDUCATION/TRAINING PROGRAM

## 2022-02-04 PROCEDURE — 99900035 HC TECH TIME PER 15 MIN (STAT)

## 2022-02-04 PROCEDURE — 25000003 PHARM REV CODE 250: Performed by: HOSPITALIST

## 2022-02-04 PROCEDURE — 83735 ASSAY OF MAGNESIUM: CPT | Performed by: EMERGENCY MEDICINE

## 2022-02-04 PROCEDURE — 80053 COMPREHEN METABOLIC PANEL: CPT | Performed by: EMERGENCY MEDICINE

## 2022-02-04 PROCEDURE — 94761 N-INVAS EAR/PLS OXIMETRY MLT: CPT

## 2022-02-04 PROCEDURE — 97535 SELF CARE MNGMENT TRAINING: CPT

## 2022-02-04 PROCEDURE — 25000242 PHARM REV CODE 250 ALT 637 W/ HCPCS: Performed by: STUDENT IN AN ORGANIZED HEALTH CARE EDUCATION/TRAINING PROGRAM

## 2022-02-04 PROCEDURE — 25000003 PHARM REV CODE 250: Performed by: EMERGENCY MEDICINE

## 2022-02-04 PROCEDURE — 25000003 PHARM REV CODE 250: Performed by: STUDENT IN AN ORGANIZED HEALTH CARE EDUCATION/TRAINING PROGRAM

## 2022-02-04 PROCEDURE — 27000221 HC OXYGEN, UP TO 24 HOURS

## 2022-02-04 PROCEDURE — 84100 ASSAY OF PHOSPHORUS: CPT | Performed by: EMERGENCY MEDICINE

## 2022-02-04 PROCEDURE — 20600001 HC STEP DOWN PRIVATE ROOM

## 2022-02-04 PROCEDURE — 85025 COMPLETE CBC W/AUTO DIFF WBC: CPT | Performed by: STUDENT IN AN ORGANIZED HEALTH CARE EDUCATION/TRAINING PROGRAM

## 2022-02-04 PROCEDURE — 99232 SBSQ HOSP IP/OBS MODERATE 35: CPT | Mod: ,,, | Performed by: STUDENT IN AN ORGANIZED HEALTH CARE EDUCATION/TRAINING PROGRAM

## 2022-02-04 PROCEDURE — 94640 AIRWAY INHALATION TREATMENT: CPT

## 2022-02-04 PROCEDURE — 36415 COLL VENOUS BLD VENIPUNCTURE: CPT | Performed by: EMERGENCY MEDICINE

## 2022-02-04 RX ORDER — POTASSIUM CHLORIDE 20 MEQ/1
40 TABLET, EXTENDED RELEASE ORAL ONCE
Status: COMPLETED | OUTPATIENT
Start: 2022-02-04 | End: 2022-02-04

## 2022-02-04 RX ADMIN — AMPICILLIN 2 G: 2 INJECTION, POWDER, FOR SOLUTION INTRAMUSCULAR; INTRAVENOUS at 04:02

## 2022-02-04 RX ADMIN — POTASSIUM CHLORIDE 40 MEQ: 1500 TABLET, EXTENDED RELEASE ORAL at 09:02

## 2022-02-04 RX ADMIN — PANTOPRAZOLE SODIUM 40 MG: 40 TABLET, DELAYED RELEASE ORAL at 08:02

## 2022-02-04 RX ADMIN — FUROSEMIDE 40 MG: 40 TABLET ORAL at 08:02

## 2022-02-04 RX ADMIN — APIXABAN 5 MG: 5 TABLET, FILM COATED ORAL at 08:02

## 2022-02-04 RX ADMIN — MELATONIN TAB 3 MG 6 MG: 3 TAB at 08:02

## 2022-02-04 RX ADMIN — PREDNISONE 5 MG: 5 TABLET ORAL at 08:02

## 2022-02-04 RX ADMIN — GUAIFENESIN AND DEXTROMETHORPHAN HYDROBROMIDE 1 TABLET: 600; 30 TABLET, EXTENDED RELEASE ORAL at 08:02

## 2022-02-04 RX ADMIN — FLUCONAZOLE 100 MG: 100 TABLET ORAL at 08:02

## 2022-02-04 RX ADMIN — ACETAMINOPHEN 650 MG: 325 TABLET ORAL at 08:02

## 2022-02-04 RX ADMIN — AMPICILLIN 2 G: 2 INJECTION, POWDER, FOR SOLUTION INTRAMUSCULAR; INTRAVENOUS at 09:02

## 2022-02-04 RX ADMIN — AMPICILLIN 2 G: 2 INJECTION, POWDER, FOR SOLUTION INTRAMUSCULAR; INTRAVENOUS at 12:02

## 2022-02-04 RX ADMIN — IPRATROPIUM BROMIDE AND ALBUTEROL SULFATE 3 ML: 2.5; .5 SOLUTION RESPIRATORY (INHALATION) at 03:02

## 2022-02-04 RX ADMIN — IPRATROPIUM BROMIDE AND ALBUTEROL SULFATE 3 ML: 2.5; .5 SOLUTION RESPIRATORY (INHALATION) at 08:02

## 2022-02-04 RX ADMIN — AMPICILLIN 2 G: 2 INJECTION, POWDER, FOR SOLUTION INTRAMUSCULAR; INTRAVENOUS at 08:02

## 2022-02-04 NOTE — ASSESSMENT & PLAN NOTE
- Anticoagulation: eliquis  - Remdesivir x 5 days (1/23), holding plaquenil (QT prolongation and negative effect to remdesivir)   - Holding cellcept  - Dexamethasone 6mg x 10 days (1/23)-completion of dex 02/01- re-started prednisone 2/2  - on room air  - Continuous pulse ox  - Airborne + contact precautions   no

## 2022-02-04 NOTE — PLAN OF CARE
Problem: Infection  Goal: Absence of Infection Signs and Symptoms  Outcome: Ongoing, Progressing     Problem: Adult Inpatient Plan of Care  Goal: Plan of Care Review  Outcome: Ongoing, Progressing  Flowsheets (Taken 2/4/2022 1656)  Plan of Care Reviewed With: patient  Goal: Patient-Specific Goal (Individualized)  Outcome: Ongoing, Progressing     Problem: Fall Injury Risk  Goal: Absence of Fall and Fall-Related Injury  Outcome: Ongoing, Progressing  Intervention: Promote Injury-Free Environment  Flowsheets (Taken 2/4/2022 1656)  Safety Promotion/Fall Prevention:   side rails raised x 2   Fall Risk reviewed with patient/family   assistive device/personal item within reach     Problem: Skin Injury Risk Increased  Goal: Skin Health and Integrity  Outcome: Ongoing, Progressing     Safety maintained. VSS. Q2 turns. No complaints of pain.

## 2022-02-04 NOTE — SUBJECTIVE & OBJECTIVE
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 Pt notes that cough has improved.  She denies any other acute complaints. No nausea, vomiting, chills, dysuria, hematuria, abd pain, constipation.  She expresses frustration with still being in the hospital.    Objective:     Vital Signs (Most Recent):  Temp: 98.9 °F (37.2 °C) (02/04/22 1533)  Pulse: 94 (02/04/22 1533)  Resp: (!) 39 (02/04/22 1533)  BP: 124/60 (02/04/22 1131)  SpO2: (!) 93 % (02/04/22 1533) Vital Signs (24h Range):  Temp:  [97.8 °F (36.6 °C)-99.2 °F (37.3 °C)] 98.9 °F (37.2 °C)  Pulse:  [] 94  Resp:  [16-39] 39  SpO2:  [93 %-100 %] 93 %  BP: (106-125)/(59-72) 124/60     Weight: 68.5 kg (151 lb 0.2 oz)  Body mass index is 25.92 kg/m².    Intake/Output Summary (Last 24 hours) at 2/4/2022 1540  Last data filed at 2/4/2022 1259  Gross per 24 hour   Intake 1230 ml   Output --   Net 1230 ml     Physical Exam  Gen: in NAD, appears stated age, appears comfortable  Neuro: AAOx3, motor, sensory, and strength grossly intact BL  HEENT: NTNC, EOMI, PERRL, MMM- resolution of white plaques w/in oropharynx   CVS: RRR, no m/r/g; S1/S2 auscultated with no S3 or S4; capillary refill < 2 sec  Resp:minor cough, transmitted upper respiratory sounds, no wheezes or focal crackles, no belabored breathing or accessory muscle use appreciated   Abd: BS+ in all 4 quadrants; NTND, soft to palpation; no organomegaly appreciated  Extrem: pulses full, equal, and regular over all 4 extremities; edema of RUE-improved, no edema of other ext    Significant Labs:   All pertinent labs within the past 24 hours have been reviewed.  Blood Culture:   No results for input(s): LABBLOO in the last 48 hours.  CBC:   Recent Labs   Lab 02/04/22 0512   WBC 15.66*   HGB 9.6*   HCT 31.5*        CMP:   Recent Labs   Lab 02/03/22 0513 02/04/22 0512   * 136   K 3.6 3.3*    100   CO2 30* 28   GLU 74 74   BUN 16 14   CREATININE 0.7 0.7   CALCIUM 8.0* 8.0*   PROT 4.9*  5.3*   ALBUMIN 2.1* 2.3*   BILITOT 0.9 1.2*   ALKPHOS 77 82   AST 20 16   ALT 26 25   ANIONGAP 5* 8   EGFRNONAA >60.0 >60.0     Urine Culture: No results for input(s): LABURIN in the last 48 hours.    Significant Imaging: I have reviewed all pertinent imaging results/findings within the past 24 hours.     TTE 01/27:  · The left ventricle is normal in size with concentric remodeling and normal systolic function. The estimated ejection fraction is 55%.  · Normal right ventricular size with normal right ventricular systolic function.  · Normal left ventricular diastolic function.  · Mild mitral regurgitation.  · No evidence of intracardiac vegetation    CXR:  FINDINGS:  Cardiac silhouette is unchanged.  Similar prominence of the upper mediastinum in this patient known goiter.  Scattered interstitial and airspace opacity with slight clearing at the right upper lobe and left base.  No significant pleural effusion or gross pneumothorax.  Advanced glenohumeral DJD.     Impression:     As above.

## 2022-02-04 NOTE — ASSESSMENT & PLAN NOTE
- BCx 01/23- Enterococcus faecalis  - Repeat BCx 01/24- contaminant, otherwise NGTD  - Appreciate ID recs: 14d total course of antibiotics- EOT 02/07/22  - Continue amp 2g q4h  - Obtain weekly outpatient laboratory on Monday or Tuesday while on IV antibiotics: CBC, CMP      Fax laboratory results to [facility] at [phone] with attn: Dr. Porter     Outpatient Infectious Diseases clinic follow up will be arranged and found in patient calendar.     Prior to discharge, please ensure the patient's follow-up has been scheduled.  If there is still no follow-up scheduled in Infectious Diseases clinic, please send an EPIC message to Gisele Smith in Infectious Diseases.

## 2022-02-04 NOTE — PROGRESS NOTES
[facility] - Telemetry [facility] Medicine  Progress Note    Patient Name: Selma Lux  MRN: [id number]  Patient Class: IP- Inpatient   Admission Date: 1/23/2022  Length of Stay: 11 days  Attending Physician: Kizzy Sandoval MD  Primary Care Provider: Bhargav Hirsch MD        Subjective:     Principal Problem:Acute hypoxemic respiratory failure        HPI:  Selma Lux is a 84 y.o. 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  She has had encephalopathy during her hospital stay and admission to rehab. She tested positive for COVID yesterday.  Paramedics state that she has been having very poor oral intake.      Collateral information from her daughter.  She notes that she has had difficulty breathing for several months.  She states she has cryptogenic pneumonia and received steroids and breathing treatments for this. 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  She noted that she had been on the ventilator and really hated being on the ventilator.  However, she would want to be intubated if her life depended on it.  She would want to try everything possible to avoid intubation.  She noted that she tested positive for COVID yesterday along with her mother.  She states that she is not able to come see her mother due to coronavirus.  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     ED course: Patient with positive COVID test and respiratory distress. This seems to be a recurrent problem  with her history of cryptogenic organizing pneumonia. Prior chest x-rays seems to have had a similar appearance to the x-ray from today.  Diffuse infiltrates.  This resolved on recent chest x-rays from her admission.  Of note this recent admission is under a different registration under the same name in epic.   Her venous blood gas does not show respiratory failure or CO2 retention. She seems to have encephalopathy but this has been an ongoing issue. Her sodium today is normalized. Patient has markedly infected urine and an elevated white count.  ED started broad-spectrum IV antibiotics.      Overview/Hospital Course:  Selma Lux is a 84 y.o. 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  She has had encephalopathy during her hospital stay and admission to rehab. She tested positive for COVID on 1/23.  Paramedics state that she has been having very poor oral intake. Patient was admitted to ICU for hypotension and respiratory distress, anticipating rapid deterioration 2/2 her medical history. Patient is on 4L NC, SpO2 stable, she had episode of hypotensions but not sustained, no pressor indicated. Mentation improved in the morning. Patient required sedation for procedure. Family visit seemed to calm her down. Patient no longer required Precedex gtt. Qtc prolongation resolved, likely 2/2 to chronic plaquenil. Patient sat well on room air. Patient ready to transfer to lower level of care unit. ID consulted. Maricruz and rocephin Dc'd 01/28. Transitioned to amp- completing 14d course. Found to have thrush w/up-trending WBC. Started on fluconazole 01/30.  Improvement in leukocytosis. Deemed stable for discharge to rehab 01/31.       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 Pt notes that cough has improved. No nausea, vomiting, chills, dysuria, hematuria, abd pain, constipation.    Objective:     Vital Signs (Most Recent):  Temp: 99.2 °F (37.3 °C) (02/03/22 1549)  Pulse: 97 (02/03/22 1804)  Resp: 16 (02/03/22 1549)  BP: 115/69 (02/03/22 1549)  SpO2: 100 % (02/03/22 1549) Vital Signs (24h Range):  Temp:  [97.8 °F (36.6 °C)-99.2 °F (37.3 °C)] 99.2 °F (37.3 °C)  Pulse:  [] 97  Resp:  [16-28] 16  SpO2:  [94 %-100 %] 100 %  BP: (106-134)/(54-80) 115/69     Weight: 68.5 kg (151 lb 0.2 oz)  Body mass index is 25.92 kg/m².    Intake/Output Summary (Last 24 hours) at 2/3/2022 1924  Last data filed at 2/3/2022 0700  Gross per 24 hour   Intake 450 ml   Output 400 ml   Net 50 ml     Physical Exam  Gen: in NAD, appears stated age, appears comfortable  Neuro: AAOx3, motor, sensory, and strength grossly intact BL  HEENT: NTNC, EOMI, PERRL, MMM- resolution of white plaques w/in oropharynx   CVS: RRR, no m/r/g; S1/S2 auscultated with no S3 or S4; capillary refill < 2 sec  Resp:minor cough, transmitted upper respiratory sounds, no wheezes or focal crackles, no belabored breathing or accessory muscle use appreciated   Abd: BS+ in all 4 quadrants; NTND, soft to palpation; no organomegaly appreciated  Extrem: pulses full, equal, and regular over all 4 extremities; edema of RUE-improved, no edema of other ext    Significant Labs:   All pertinent labs within the past 24 hours have been reviewed.  Blood Culture:   No results for input(s): LABBLOO in the last 48 hours.  CBC:   Recent Labs   Lab 02/02/22  0438   WBC 15.13*   HGB 8.1*   HCT 26.9*        CMP:   Recent Labs   Lab 02/02/22  0438 02/03/22  0513   * 135*   K 3.9 3.6   CL 98 100   CO2 25 30*   * 74   BUN 17 16   CREATININE 0.7 0.7   CALCIUM 7.8* 8.0*   PROT 4.8* 4.9*   ALBUMIN 2.2* 2.1*    BILITOT 0.8 0.9   ALKPHOS 93 77   AST 21 20   ALT 25 26   ANIONGAP 8 5*   EGFRNONAA >60.0 >60.0     Urine Culture: No results for input(s): LABURIN in the last 48 hours.    Significant Imaging: I have reviewed all pertinent imaging results/findings within the past 24 hours.     TTE 01/27:  · The left ventricle is normal in size with concentric remodeling and normal systolic function. The estimated ejection fraction is 55%.  · Normal right ventricular size with normal right ventricular systolic function.  · Normal left ventricular diastolic function.  · Mild mitral regurgitation.  · No evidence of intracardiac vegetation    CXR:  FINDINGS:  Cardiac silhouette is unchanged.  Similar prominence of the upper mediastinum in this patient known goiter.  Scattered interstitial and airspace opacity with slight clearing at the right upper lobe and left base.  No significant pleural effusion or gross pneumothorax.  Advanced glenohumeral DJD.     Impression:     As above.      Assessment/Plan:      * Acute hypoxemic respiratory failure  - Resolved- appeared to be 2/2 CHF, COPD and Interstitial lung disease + COVID-19 infection  - Stable on room air    Thrush  - white plaques present w/in oropharynx initially- resolving   - no dysphagia or odoynophagia  - no concern for esophageal thrush  - continue treatment w/fluconazole for 14 days total- EOT 02/13    Enterococcal bacteremia  - BCx 01/23- Enterococcus faecalis  - Repeat BCx 01/24- contaminant, otherwise NGTD  - Appreciate ID recs: 14d total course of antibiotics- EOT 02/07/22  - Continue amp 2g q4h  - Obtain weekly outpatient laboratory on Monday or Tuesday while on IV antibiotics: CBC, CMP      Fax laboratory results to [facility] at [phone] with attn: Dr. Porter     Outpatient Infectious Diseases clinic follow up will be arranged and found in patient calendar.     Prior to discharge, please ensure the patient's follow-up has been scheduled.  If there is still no  follow-up scheduled in Infectious Diseases clinic, please send an EPIC message to Gisele Smith in Infectious Diseases.    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   - Historically prescribe bactrim for PCP prophylaxis  - Dex completed 02/01- resume prednisone today- 5mg qday  - holding Plaquenil and cellcept    COPD exacerbation  - resolved  - continue duoneb q6h for sob/wheeze      COVID-19 virus infection  - Anticoagulation: eliquis  - Remdesivir x 5 days (1/23), holding plaquenil (QT prolongation and negative effect to remdesivir)   - Holding cellcept  - Dexamethasone 6mg x 10 days (1/23)-completion of dex 02/01- re-started prednisone 2/2  - on room air  - Continuous pulse ox  - Airborne + contact precautions    Chronic systolic (congestive) heart failure  - TTE 02/27/20:  Grade 1 diastolic dysfunction, EF 55%  - No exacerbation at this time  - Stable, continue GDMT    Leukocytosis  - Improving- likely 2/2 thrush- continuing w/fluconazole  - WBC improving   - Repeat BCx NGTG  - Resp Cx w/Candida albicans       Multinodular goiter  - Enlarged thyroid gland resulting in leftward deviation and mass effect upon the airway => anticipate difficult airway access   - Prior consultations with surgery, patient lost following up due to multiple recent medical events     UTI (urinary tract infection)  - UCx w/Ecoli 01/23- continue amp  - EOT 02/07/22      AF (paroxysmal atrial fibrillation)  - Continue Eliquis  - Recent head bleed 12/13  - CHADSVASC of 7    Oropharyngeal dysphagia  - Evaluated by SLP- diet recommendations- dental soft, think liquids  - Aspiration precautions: 1 bite/sip at a time, Assistance with meals, Avoid talking while eating, Eliminate distractions, Feed only when awake/alert, HOB to 90 degrees, Meds whole 1 at a time, Remain upright 30 minutes post meal and Small bites/sips        Chronic renal failure syndrome,  stage 3 (moderate)  - stable renal function   - strict I and O  - Continue PO lasix to 40mg qday      Long QT interval  - last ecg 01/30- 446  - resolving   - avoid QT prolonging agents if possible   - likely 2/2 plaquenil use  - continue to monitor QTc      Hypertension, essential  - BP currently well-controlled  - Holding home regimen of amlodipine   - Will continue to monitor and further titrate antihypertensives as clinically indicated       PUD (peptic ulcer disease)  - continue PPI        VTE Risk Mitigation (From admission, onward)         Ordered     apixaban tablet 5 mg  2 times daily         01/30/22 1420     IP VTE HIGH RISK PATIENT  Once         01/23/22 1259     Place sequential compression device  Until discontinued         01/23/22 1259                Discharge Planning   LETICIA: 2/3/2022     Code Status: Full Code   Is the patient medically ready for discharge?: Yes    Reason for patient still in hospital (select all that apply): Other (specify) Placement  Discharge Plan A: Rehab   Discharge Delays: None known at this time              Kizzy Sandoval MD  Department of Hospital Medicine   [facility] - Telemetry Stepdown

## 2022-02-04 NOTE — PT/OT/SLP PROGRESS
Physical Therapy      Patient Name:  Selma Lux   MRN:  [id number]    Patient not seen today secondary pt in care of nursing at [facility].  Will follow-up at next scheduled visit per PT POC.

## 2022-02-04 NOTE — PROGRESS NOTES
[facility] - Telemetry [facility] Medicine  Progress Note    Patient Name: Selma Lux  MRN: [id number]  Patient Class: IP- Inpatient   Admission Date: 1/23/2022  Length of Stay: 12 days  Attending Physician: Kizzy Sandoval MD  Primary Care Provider: Bhargav Hirsch MD        Subjective:     Principal Problem:Acute hypoxemic respiratory failure        HPI:  Selma Lux is a 84 y.o. 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  She has had encephalopathy during her hospital stay and admission to rehab. She tested positive for COVID yesterday.  Paramedics state that she has been having very poor oral intake.      Collateral information from her daughter.  She notes that she has had difficulty breathing for several months.  She states she has cryptogenic pneumonia and received steroids and breathing treatments for this. 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  She noted that she had been on the ventilator and really hated being on the ventilator.  However, she would want to be intubated if her life depended on it.  She would want to try everything possible to avoid intubation.  She noted that she tested positive for COVID yesterday along with her mother.  She states that she is not able to come see her mother due to coronavirus.  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     ED course: Patient with positive COVID test and respiratory distress. This seems to be a recurrent problem  with her history of cryptogenic organizing pneumonia. Prior chest x-rays seems to have had a similar appearance to the x-ray from today.  Diffuse infiltrates.  This resolved on recent chest x-rays from her admission.  Of note this recent admission is under a different registration under the same name in epic.   Her venous blood gas does not show respiratory failure or CO2 retention. She seems to have encephalopathy but this has been an ongoing issue. Her sodium today is normalized. Patient has markedly infected urine and an elevated white count.  ED started broad-spectrum IV antibiotics.      Overview/Hospital Course:  Selma Lux is a 84 y.o. 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  She has had encephalopathy during her hospital stay and admission to rehab. She tested positive for COVID on 1/23.  Paramedics state that she has been having very poor oral intake. Patient was admitted to ICU for hypotension and respiratory distress, anticipating rapid deterioration 2/2 her medical history. Patient is on 4L NC, SpO2 stable, she had episode of hypotensions but not sustained, no pressor indicated. Mentation improved in the morning. Patient required sedation for procedure. Family visit seemed to calm her down. Patient no longer required Precedex gtt. Qtc prolongation resolved, likely 2/2 to chronic plaquenil. Patient sat well on room air. Patient ready to transfer to lower level of care unit. ID consulted. Maricruz and rocephin Dc'd 01/28. Transitioned to amp- completing 14d course. Found to have thrush w/up-trending WBC. Started on fluconazole 01/30.  Improvement in leukocytosis. Deemed stable for discharge to rehab 01/31.       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 Pt notes that cough has improved.  She denies any other acute complaints. No nausea, vomiting, chills, dysuria, hematuria, abd pain, constipation.  She expresses frustration with still being in the hospital.    Objective:     Vital Signs (Most Recent):  Temp: 98.9 °F (37.2 °C) (02/04/22 1533)  Pulse: 94 (02/04/22 1533)  Resp: (!) 39 (02/04/22 1533)  BP: 124/60 (02/04/22 1131)  SpO2: (!) 93 % (02/04/22 1533) Vital Signs (24h Range):  Temp:  [97.8 °F (36.6 °C)-99.2 °F (37.3 °C)] 98.9 °F (37.2 °C)  Pulse:  [] 94  Resp:  [16-39] 39  SpO2:  [93 %-100 %] 93 %  BP: (106-125)/(59-72) 124/60     Weight: 68.5 kg (151 lb 0.2 oz)  Body mass index is 25.92 kg/m².    Intake/Output Summary (Last 24 hours) at 2/4/2022 1540  Last data filed at 2/4/2022 1259  Gross per 24 hour   Intake 1230 ml   Output --   Net 1230 ml     Physical Exam  Gen: in NAD, appears stated age, appears comfortable  Neuro: AAOx3, motor, sensory, and strength grossly intact BL  HEENT: NTNC, EOMI, PERRL, MMM- resolution of white plaques w/in oropharynx   CVS: RRR, no m/r/g; S1/S2 auscultated with no S3 or S4; capillary refill < 2 sec  Resp:minor cough, transmitted upper respiratory sounds, no wheezes or focal crackles, no belabored breathing or accessory muscle use appreciated   Abd: BS+ in all 4 quadrants; NTND, soft to palpation; no organomegaly appreciated  Extrem: pulses full, equal, and regular over all 4 extremities; edema of RUE-improved, no edema of other ext    Significant Labs:   All pertinent labs within the past 24 hours have been reviewed.  Blood Culture:   No results for input(s): LABBLOO in the last 48 hours.  CBC:   Recent Labs   Lab 02/04/22 0512   WBC 15.66*   HGB 9.6*   HCT 31.5*        CMP:   Recent Labs   Lab 02/03/22 0513 02/04/22 0512   * 136   K 3.6 3.3*    100   CO2 30* 28    GLU 74 74   BUN 16 14   CREATININE 0.7 0.7   CALCIUM 8.0* 8.0*   PROT 4.9* 5.3*   ALBUMIN 2.1* 2.3*   BILITOT 0.9 1.2*   ALKPHOS 77 82   AST 20 16   ALT 26 25   ANIONGAP 5* 8   EGFRNONAA >60.0 >60.0     Urine Culture: No results for input(s): LABURIN in the last 48 hours.    Significant Imaging: I have reviewed all pertinent imaging results/findings within the past 24 hours.     TTE 01/27:  · The left ventricle is normal in size with concentric remodeling and normal systolic function. The estimated ejection fraction is 55%.  · Normal right ventricular size with normal right ventricular systolic function.  · Normal left ventricular diastolic function.  · Mild mitral regurgitation.  · No evidence of intracardiac vegetation    CXR:  FINDINGS:  Cardiac silhouette is unchanged.  Similar prominence of the upper mediastinum in this patient known goiter.  Scattered interstitial and airspace opacity with slight clearing at the right upper lobe and left base.  No significant pleural effusion or gross pneumothorax.  Advanced glenohumeral DJD.     Impression:     As above.      Assessment/Plan:      * Acute hypoxemic respiratory failure  - Resolved- appeared to be 2/2 CHF, COPD and Interstitial lung disease + COVID-19 infection  - Stable on room air    Moderate malnutrition  Registered dietitian following, appreciate recs      Thrush  - white plaques present w/in oropharynx initially- resolving   - no dysphagia or odoynophagia  - no concern for esophageal thrush  - continue treatment w/fluconazole for 14 days total- EOT 02/13    Enterococcal bacteremia  - BCx 01/23- Enterococcus faecalis  - Repeat BCx 01/24- contaminant, otherwise NGTD  - Appreciate ID recs: 14d total course of antibiotics- EOT 02/07/22  - Continue amp 2g q4h  - Obtain weekly outpatient laboratory on Monday or Tuesday while on IV antibiotics: CBC, CMP      Fax laboratory results to [facility] at [phone] with attn: Dr. Porter     Outpatient Infectious  Diseases clinic follow up will be arranged and found in patient calendar.     Prior to discharge, please ensure the patient's follow-up has been scheduled.  If there is still no follow-up scheduled in Infectious Diseases clinic, please send an EPIC message to Gisele Smith in Infectious Diseases.    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   - Historically prescribe bactrim for PCP prophylaxis  - Dex completed 02/01- resume prednisone today- 5mg qday  - holding Plaquenil and cellcept    COPD exacerbation  - resolved  - continue duoneb q6h for sob/wheeze      COVID-19 virus infection  - Anticoagulation: eliquis  - Remdesivir x 5 days (1/23), holding plaquenil (QT prolongation and negative effect to remdesivir)   - Holding cellcept  - Dexamethasone 6mg x 10 days (1/23)-completion of dex 02/01- re-started prednisone 2/2  - on room air  - Continuous pulse ox  - Airborne + contact precautions    Chronic systolic (congestive) heart failure  - TTE 02/27/20:  Grade 1 diastolic dysfunction, EF 55%  - No exacerbation at this time  - Stable, continue GDMT    Leukocytosis  - Improving- likely 2/2 thrush- continuing w/fluconazole  - WBC improving   - Repeat BCx NGTG  - Resp Cx w/Candida albicans       Multinodular goiter  - Enlarged thyroid gland resulting in leftward deviation and mass effect upon the airway => anticipate difficult airway access   - Prior consultations with surgery, patient lost following up due to multiple recent medical events     UTI (urinary tract infection)  - UCx w/Ecoli 01/23- continue amp  - EOT 02/07/22      AF (paroxysmal atrial fibrillation)  - Continue Eliquis  - Recent head bleed 12/13  - CHADSVASC of 7    Oropharyngeal dysphagia  - Evaluated by SLP- diet recommendations- dental soft, think liquids  - Aspiration precautions: 1 bite/sip at a time, Assistance with meals, Avoid talking while eating, Eliminate  distractions, Feed only when awake/alert, HOB to 90 degrees, Meds whole 1 at a time, Remain upright 30 minutes post meal and Small bites/sips        Chronic renal failure syndrome, stage 3 (moderate)  - stable renal function   - strict I and O  - Continue PO lasix to 40mg qday      Long QT interval  - last ecg 01/30- 446  - resolving   - avoid QT prolonging agents if possible   - likely 2/2 plaquenil use  - continue to monitor QTc      Hypertension, essential  - BP currently well-controlled  - Holding home regimen of amlodipine   - Will continue to monitor and further titrate antihypertensives as clinically indicated       PUD (peptic ulcer disease)  - continue PPI        VTE Risk Mitigation (From admission, onward)         Ordered     apixaban tablet 5 mg  2 times daily         01/30/22 1420     IP VTE HIGH RISK PATIENT  Once         01/23/22 1259     Place sequential compression device  Until discontinued         01/23/22 1259                Discharge Planning   LETICIA: 2/3/2022     Code Status: Full Code   Is the patient medically ready for discharge?: Yes    Reason for patient still in hospital (select all that apply): Other (specify) Bed availability at rehab  Discharge Plan A: Rehab   Discharge Delays: None known at this time              Kizzy Sandoval MD  Department of Hospital Medicine   [facility] - Telemetry Stepdown

## 2022-02-04 NOTE — ASSESSMENT & PLAN NOTE
- Anticoagulation: eliquis  - Remdesivir x 5 days (1/23), holding plaquenil (QT prolongation and negative effect to remdesivir)   - Holding cellcept  - Dexamethasone 6mg x 10 days (1/23)-completion of dex 02/01- re-started prednisone 2/2  - on room air  - Continuous pulse ox  - Airborne + contact precautions

## 2022-02-04 NOTE — ASSESSMENT & PLAN NOTE
- BCx 01/23- Enterococcus faecalis  - Repeat BCx 01/24- contaminant, otherwise NGTD  - Appreciate ID recs: 14d total course of antibiotics- EOT 02/07/22  - Continue amp 2g q4h  - Obtain weekly outpatient laboratory on Monday or Tuesday while on IV antibiotics: CBC, CMP      Fax laboratory results to [facility] at [phone] with attn: Dr. Porter     Outpatient Infectious Diseases clinic follow up will be arranged and found in patient calendar.     Prior to discharge, please ensure the patient's follow-up has been scheduled.  If there is still no follow-up scheduled in Infectious Diseases clinic, please send an EPIC message to Gisele Smith in Infectious Diseases.

## 2022-02-04 NOTE — SUBJECTIVE & OBJECTIVE
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 Pt notes that cough has improved. No nausea, vomiting, chills, dysuria, hematuria, abd pain, constipation.    Objective:     Vital Signs (Most Recent):  Temp: 99.2 °F (37.3 °C) (02/03/22 1549)  Pulse: 97 (02/03/22 1804)  Resp: 16 (02/03/22 1549)  BP: 115/69 (02/03/22 1549)  SpO2: 100 % (02/03/22 1549) Vital Signs (24h Range):  Temp:  [97.8 °F (36.6 °C)-99.2 °F (37.3 °C)] 99.2 °F (37.3 °C)  Pulse:  [] 97  Resp:  [16-28] 16  SpO2:  [94 %-100 %] 100 %  BP: (106-134)/(54-80) 115/69     Weight: 68.5 kg (151 lb 0.2 oz)  Body mass index is 25.92 kg/m².    Intake/Output Summary (Last 24 hours) at 2/3/2022 1924  Last data filed at 2/3/2022 0700  Gross per 24 hour   Intake 450 ml   Output 400 ml   Net 50 ml     Physical Exam  Gen: in NAD, appears stated age, appears comfortable  Neuro: AAOx3, motor, sensory, and strength grossly intact BL  HEENT: NTNC, EOMI, PERRL, MMM- resolution of white plaques w/in oropharynx   CVS: RRR, no m/r/g; S1/S2 auscultated with no S3 or S4; capillary refill < 2 sec  Resp:minor cough, transmitted upper respiratory sounds, no wheezes or focal crackles, no belabored breathing or accessory muscle use appreciated   Abd: BS+ in all 4 quadrants; NTND, soft to palpation; no organomegaly appreciated  Extrem: pulses full, equal, and regular over all 4 extremities; edema of RUE-improved, no edema of other ext    Significant Labs:   All pertinent labs within the past 24 hours have been reviewed.  Blood Culture:   No results for input(s): LABBLOO in the last 48 hours.  CBC:   Recent Labs   Lab 02/02/22  0438   WBC 15.13*   HGB 8.1*   HCT 26.9*        CMP:   Recent Labs   Lab 02/02/22  0438 02/03/22  0513   * 135*   K 3.9 3.6   CL 98 100   CO2 25 30*   * 74   BUN 17 16   CREATININE 0.7 0.7   CALCIUM 7.8* 8.0*   PROT 4.8* 4.9*   ALBUMIN 2.2* 2.1*   BILITOT 0.8 0.9   ALKPHOS 93 77   AST 21 20   ALT 25 26   ANIONGAP 8 5*    EGFRNONAA >60.0 >60.0     Urine Culture: No results for input(s): LABURIN in the last 48 hours.    Significant Imaging: I have reviewed all pertinent imaging results/findings within the past 24 hours.     TTE 01/27:  · The left ventricle is normal in size with concentric remodeling and normal systolic function. The estimated ejection fraction is 55%.  · Normal right ventricular size with normal right ventricular systolic function.  · Normal left ventricular diastolic function.  · Mild mitral regurgitation.  · No evidence of intracardiac vegetation    CXR:  FINDINGS:  Cardiac silhouette is unchanged.  Similar prominence of the upper mediastinum in this patient known goiter.  Scattered interstitial and airspace opacity with slight clearing at the right upper lobe and left base.  No significant pleural effusion or gross pneumothorax.  Advanced glenohumeral DJD.     Impression:     As above.

## 2022-02-04 NOTE — ASSESSMENT & PLAN NOTE
- Improving- likely 2/2 thrush- continuing w/fluconazole  - WBC improving   - Repeat BCx NGTG  - Resp Cx w/Candida albicans

## 2022-02-04 NOTE — PLAN OF CARE
Recommendations     1. Continue current Dysphagia soft diet + Boost Plus ONS.   2. RD to monitor & follow-up.     Goals: Meet % EEN, EPN by RD f/u date  Nutrition Goal Status: new  Communication of RD Recs: reviewed with RN

## 2022-02-04 NOTE — PT/OT/SLP PROGRESS
Occupational Therapy Treatment    Patient Name:  Selma Lux   MRN:  [id number]  Admit Date: 1/23/2022  Admitting Diagnosis:  Acute hypoxemic respiratory failure   Length of Stay: 12 days  Recent Surgery: * No surgery found *      Recommendations:     Discharge Recommendations: rehabilitation facility  Discharge Equipment Recommendations:  wheelchair  Barriers to discharge:  Inaccessible home environment,Decreased caregiver support    Plan:     Patient to be seen 3 x/week to address the above listed problems via self-care/home management,therapeutic activities,therapeutic exercises,neuromuscular re-education  · Plan of Care Expires: 02/22/22  · Plan of Care Reviewed with: patient    Assessment:   Selma Lux is a 86 y.o. female with a medical diagnosis of Acute hypoxemic respiratory failure.  She presents with the following performance deficits affecting function: weakness,impaired endurance,impaired self care skills,impaired functional mobilty,gait instability,impaired balance,decreased safety awareness,decreased lower extremity function,decreased upper extremity function,impaired coordination,decreased ROM,impaired cardiopulmonary response to activity.      Pt tolerated session fairly this date and was willing to participate. Demonstrating continued decreased activity tolerance, impaired endurance, increased fatigue, impaired balance, impaired cardiopulmonary response to activity and decreased safety awareness, requiring increased time and assistance to complete functional tasks. Patient completed bed mobility with Mod A while HOB elevated. Upon sitting EOB, patient with increased anxiety and WOB on anticipated stand and mobility trials, requiring increased time. Patient redirected to grooming/hygiene tasks seated EOB. Unable to complete stand trials this date due to increased fatigue with prolonged EOB sitting and anxiousness, returned to supine. Patient is progressing towards established goals,  "and continues to benefit from acute skilled OT services to increase functional performance and improve quality of life. OT to continue to recommend Rehab (return) at discharge to improve pt functional independence and increase patient safety before returning home.      Rehab Prognosis: Fair; patient would benefit from acute skilled OT services to address these deficits and reach maximum level of function.        Subjective   Communicated with: Nurse prior to session.  Patient found HOB elevated with bed alarm,telemetry,pulse ox (continuous),peripheral IV,PureWick upon OT entry to room. Pt agreeable to participate at this time.    Patient: " You're just who I need. I want to get up out this bed. "    Pain/Comfort:  · Pain Rating 1: 0/10  · Pain Rating Post-Intervention 1: 0/10    Objective:   Patient found with: bed alarm,telemetry,pulse ox (continuous),peripheral IV,PureWick   General Precautions: Standard, Cardiac airborne,contact,droplet,fall   Orthopedic Precautions:N/A   Braces: N/A   Oxygen Device: Room Air  Vitals: /60   Pulse 94   Temp 98.9 °F (37.2 °C)   Resp (!) 39   Ht 5' 4" (1.626 m)   Wt 68.5 kg (151 lb 0.2 oz)   LMP  (LMP Unknown)   SpO2 (!) 93%   Breastfeeding No   BMI 25.92 kg/m²     Outcome Measures:  [facility] 6 Click ADL: 16    Cognition:   · Sedated and Cooperative  · Command following: follows one-step commands  · Communication: clear/fluent    Occupational Performance:  Bed Mobility:    · Patient completed Rolling/Turning to Left with  moderate assistance  · Patient completed Rolling/Turning to Right with moderate assistance  · Patient completed Supine to Sit with moderate assistance on R side of bed  · Scooting anteriorly to EOB to have both feet planted on floor: moderate assistance  · Patient completed Sit to Supine with moderate assistance on R side of bed  · Scooting to HOB in supine: dependent and drawsheet pull    Functional Mobility/Transfers:   Static Sitting EOB: " SBA  Deferred functional mobility trials due to increased fatigue and anxiety    Activities of Daily Living:  · Grooming: stand by assistance to perform oral care and wash face seated EOB  · Toileting: dependence to perform perirectal care at bed level due to BM incontinence    AMPA 6 Click ADL:  AMPAC Total Score: 16    Treatment & Education:  -Pt education on OT role and POC.  -Importance of E/OOB activity with staff assistance, emphasis on daily participation  -Patient tolerated EOB sitting for 25 minutes with SBA  -Safety during functional transfer and mobility ensured  -Patient provided with education on importance of Bilateral UB/LB integration during functional tasks for improvement in functional performance.   -Education provided/reviewed, questions answered within OT scope of practice.   -Patient demonstrates understanding and learning this date.         Patient left HOB elevated with all lines intact, call button in reach and nurse notified    GOALS:   Multidisciplinary Problems     Occupational Therapy Goals        Problem: Occupational Therapy Goal    Goal Priority Disciplines Outcome Interventions   Occupational Therapy Goal     OT, PT/OT Ongoing, Progressing    Description: Goals to be met by: 2/7/2022    Patient will increase functional independence with ADLs by performing:    UE Dressing with Minimal Assistance.  Grooming while EOB with Moderate Assistance.  Toileting from bedside commode with Moderate Assistance for hygiene and clothing management.   Sitting at edge of bed x10 minutes with Moderate Assistance.  Toilet transfer to bedside commode with Moderate Assistance.                     Time Tracking:     OT Date of Treatment: 02/04/22  OT Start Time: 1440  OT Stop Time: 1526  OT Total Time (min): 46 min    Billable Minutes:Self Care/Home Management 46      2/4/2022

## 2022-02-04 NOTE — PLAN OF CARE
Problem: Infection  Goal: Absence of Infection Signs and Symptoms  Outcome: Ongoing, Progressing     Problem: Adult Inpatient Plan of Care  Goal: Plan of Care Review  Outcome: Ongoing, Progressing  Goal: Patient-Specific Goal (Individualized)  Outcome: Ongoing, Progressing     Problem: Fall Injury Risk  Goal: Absence of Fall and Fall-Related Injury  Outcome: Ongoing, Progressing  Intervention: Promote Injury-Free Environment  Flowsheets (Taken 2/3/2022 1812)  Safety Promotion/Fall Prevention:   assistive device/personal item within reach   side rails raised x 2     Problem: Skin Injury Risk Increased  Goal: Skin Health and Integrity  Outcome: Ongoing, Progressing    Safety maintained. VSS. Q2 turns. No complaints of pain.

## 2022-02-04 NOTE — ASSESSMENT & PLAN NOTE
Nutrition Problem:  Moderate Protein-Calorie Malnutrition  Malnutrition in the context of Acute Illness/Injury    Related to (etiology):  Inability to consume sufficient energy, decreased appetite     Signs and Symptoms (as evidenced by):  Energy Intake: <75% of estimated energy requirement for > 7 days   Weight Loss: 5% x 1 month    Interventions(treatment strategy):  Collaboration of nutrition care w/ other providers  ONS    Nutrition Diagnosis Status:  New

## 2022-02-04 NOTE — RESPIRATORY THERAPY
RAPID RESPONSE RESPIRATORY CHART CHECK       Chart check completed, instructed to call [phone] for further concerns or assistance.

## 2022-02-04 NOTE — PROGRESS NOTES
"  [facility] - Telemetry Stepdown  Adult Nutrition  Consult Note    SUMMARY     Recommendations    1. Continue current Dysphagia soft diet + Boost Plus ONS.   2. RD to monitor & follow-up.    Goals: Meet % EEN, EPN by RD f/u date  Nutrition Goal Status: new  Communication of RD Recs: reviewed with RN    Assessment and Plan    Moderate malnutrition    Nutrition Problem:  Moderate Protein-Calorie Malnutrition  Malnutrition in the context of Acute Illness/Injury    Related to (etiology):  Inability to consume sufficient energy, decreased appetite     Signs and Symptoms (as evidenced by):  Energy Intake: <75% of estimated energy requirement for > 7 days   Weight Loss: 5% x 1 month    Interventions(treatment strategy):  Collaboration of nutrition care w/ other providers  ONS    Nutrition Diagnosis Status:  New     Malnutrition Assessment    Weight Loss (Malnutrition): 5% in 1 month  Energy Intake (Malnutrition): less than 75% for greater than 7 days     Reason for Assessment    Reason For Assessment: length of stay  Diagnosis: other (see comments) (Resp. fx)  Relevant Medical History: HF, COPD, Dementia  Interdisciplinary Rounds: did not attend    General Information Comments: +COVID-19. Per RN documentation, pt tolerating diet w/ 50-75% PO intake. Per H & P, pt w/ decreased appetite PTA; UBW: 160# per chart review. Unable to complete NFPE, however based on 5% weight loss & inadequate energy intake PTA, RD feels pt meets criteria for moderate malnutrition. Please see PES statment for details.  Nutrition Discharge Planning: Adequate PO intake    Nutrition/Diet History    Spiritual, Cultural Beliefs, Jew Practices, Values that Affect Care: no  Factors Affecting Nutritional Intake: decreased appetite    Anthropometrics    Temp: 99 °F (37.2 °C)  Height: 5' 4" (162.6 cm)  Height (inches): 64 in  Weight Method: Bed Scale  Weight: 68.5 kg (151 lb 0.2 oz)  Weight (lb): 151.02 lb  Ideal Body Weight (IBW), Female: 120 " lb  % Ideal Body Weight, Female (lb): 125.85 %  BMI (Calculated): 25.9  BMI Grade: 25 - 29.9 - overweight  Usual Body Weight (UBW), k.7 kg  % Usual Body Weight: 94.42  % Weight Change From Usual Weight: -5.78 %    Lab/Procedures/Meds    Pertinent Labs Reviewed: reviewed  Pertinent Labs Comments: -  Pertinent Medications Reviewed: reviewed  Pertinent Medications Comments: -    Estimated/Assessed Needs    Weight Used For Calorie Calculations: 68.5 kg (151 lb 0.2 oz)     Energy Calorie Requirements (kcal): 1400 kcal/d  Energy Need Method: [place]-St Jeor (1.25 PAL)     Protein Requirements: 69-82 g/d (1-1.2 g/kg)  Weight Used For Protein Calculations: 68.5 kg (151 lb 0.2 oz)     Estimated Fluid Requirement Method: other (see comments) (Per MD or 1 mL/kcal)  RDA Method (mL): 1400    Nutrition Prescription Ordered    Current Diet Order: Dysphagia soft  Oral Nutrition Supplement: Boost Plus ONS    Evaluation of Received Nutrient/Fluid Intake    I/O: +7L since admit    Comments: LBM:     Tolerance: tolerating    Nutrition Risk    Level of Risk/Frequency of Follow-up: (1x/week)     Monitor and Evaluation    Food and Nutrient Intake: energy intake,food and beverage intake  Food and Nutrient Adminstration: diet order  Physical Activity and Function: nutrition-related ADLs and IADLs  Anthropometric Measurements: weight,weight change  Biochemical Data, Medical Tests and Procedures: glucose/endocrine profile,lipid profile,inflammatory profile,gastrointestinal profile,electrolyte and renal panel  Nutrition-Focused Physical Findings: overall appearance     Nutrition Follow-Up    RD Follow-up?: Yes

## 2022-02-04 NOTE — CONSULTS
[facility] Medicine  Telemedicine Consult Note    Patient Name: Selma Lux  MRN: [id number]  Admission Date: 1/23/2022  Hospital Length of Stay: 11 days  Attending Physician: Kizzy Sandoval MD   Primary Care Provider: Bhargav Hirsch MD     Thank you for your consult to [facility]. We have reviewed the patient chart. This patient does not meet criteria for [facility] service at this time due to Patient is unlikely to participate well with the telemedicine platform due to dementia; and in a room requiring interaction with an iPad for [facility] visits... Will hand back to In-house service..        Jocy Cox MD  Department of Hospital Medicine   [facility]

## 2022-02-05 PROBLEM — R17 SERUM TOTAL BILIRUBIN ELEVATED: Status: ACTIVE | Noted: 2022-02-05

## 2022-02-05 LAB
ALBUMIN SERPL BCP-MCNC: 2 G/DL (ref 3.5–5.2)
ALP SERPL-CCNC: 69 U/L (ref 55–135)
ALT SERPL W/O P-5'-P-CCNC: 20 U/L (ref 10–44)
ANION GAP SERPL CALC-SCNC: 5 MMOL/L (ref 8–16)
AST SERPL-CCNC: 15 U/L (ref 10–40)
BILIRUB SERPL-MCNC: 1.3 MG/DL (ref 0.1–1)
BUN SERPL-MCNC: 10 MG/DL (ref 8–23)
CALCIUM SERPL-MCNC: 7.6 MG/DL (ref 8.7–10.5)
CHLORIDE SERPL-SCNC: 102 MMOL/L (ref 95–110)
CO2 SERPL-SCNC: 29 MMOL/L (ref 23–29)
CREAT SERPL-MCNC: 0.6 MG/DL (ref 0.5–1.4)
EST. GFR  (AFRICAN AMERICAN): >60 ML/MIN/1.73 M^2
EST. GFR  (NON AFRICAN AMERICAN): >60 ML/MIN/1.73 M^2
GLUCOSE SERPL-MCNC: 65 MG/DL (ref 70–110)
MAGNESIUM SERPL-MCNC: 1.8 MG/DL (ref 1.6–2.6)
PHOSPHATE SERPL-MCNC: 2.5 MG/DL (ref 2.7–4.5)
POTASSIUM SERPL-SCNC: 3.8 MMOL/L (ref 3.5–5.1)
PROT SERPL-MCNC: 4.8 G/DL (ref 6–8.4)
SODIUM SERPL-SCNC: 136 MMOL/L (ref 136–145)

## 2022-02-05 PROCEDURE — 20600001 HC STEP DOWN PRIVATE ROOM

## 2022-02-05 PROCEDURE — 25000003 PHARM REV CODE 250: Performed by: EMERGENCY MEDICINE

## 2022-02-05 PROCEDURE — 25000242 PHARM REV CODE 250 ALT 637 W/ HCPCS: Performed by: STUDENT IN AN ORGANIZED HEALTH CARE EDUCATION/TRAINING PROGRAM

## 2022-02-05 PROCEDURE — 83735 ASSAY OF MAGNESIUM: CPT | Performed by: EMERGENCY MEDICINE

## 2022-02-05 PROCEDURE — 63700000 PHARM REV CODE 250 ALT 637 W/O HCPCS: Performed by: STUDENT IN AN ORGANIZED HEALTH CARE EDUCATION/TRAINING PROGRAM

## 2022-02-05 PROCEDURE — 99900035 HC TECH TIME PER 15 MIN (STAT)

## 2022-02-05 PROCEDURE — 36415 COLL VENOUS BLD VENIPUNCTURE: CPT | Performed by: EMERGENCY MEDICINE

## 2022-02-05 PROCEDURE — 25000003 PHARM REV CODE 250: Performed by: STUDENT IN AN ORGANIZED HEALTH CARE EDUCATION/TRAINING PROGRAM

## 2022-02-05 PROCEDURE — 80053 COMPREHEN METABOLIC PANEL: CPT | Performed by: EMERGENCY MEDICINE

## 2022-02-05 PROCEDURE — 94640 AIRWAY INHALATION TREATMENT: CPT

## 2022-02-05 PROCEDURE — 27000207 HC ISOLATION

## 2022-02-05 PROCEDURE — 94761 N-INVAS EAR/PLS OXIMETRY MLT: CPT

## 2022-02-05 PROCEDURE — 99232 PR SUBSEQUENT HOSPITAL CARE,LEVL II: ICD-10-PCS | Mod: CR,,, | Performed by: STUDENT IN AN ORGANIZED HEALTH CARE EDUCATION/TRAINING PROGRAM

## 2022-02-05 PROCEDURE — 99232 SBSQ HOSP IP/OBS MODERATE 35: CPT | Mod: CR,,, | Performed by: STUDENT IN AN ORGANIZED HEALTH CARE EDUCATION/TRAINING PROGRAM

## 2022-02-05 PROCEDURE — 63600175 PHARM REV CODE 636 W HCPCS: Performed by: STUDENT IN AN ORGANIZED HEALTH CARE EDUCATION/TRAINING PROGRAM

## 2022-02-05 PROCEDURE — 84100 ASSAY OF PHOSPHORUS: CPT | Performed by: EMERGENCY MEDICINE

## 2022-02-05 RX ADMIN — GUAIFENESIN AND DEXTROMETHORPHAN HYDROBROMIDE 1 TABLET: 600; 30 TABLET, EXTENDED RELEASE ORAL at 08:02

## 2022-02-05 RX ADMIN — AMPICILLIN 2 G: 2 INJECTION, POWDER, FOR SOLUTION INTRAMUSCULAR; INTRAVENOUS at 12:02

## 2022-02-05 RX ADMIN — FUROSEMIDE 40 MG: 40 TABLET ORAL at 08:02

## 2022-02-05 RX ADMIN — APIXABAN 5 MG: 5 TABLET, FILM COATED ORAL at 08:02

## 2022-02-05 RX ADMIN — IPRATROPIUM BROMIDE AND ALBUTEROL SULFATE 3 ML: 2.5; .5 SOLUTION RESPIRATORY (INHALATION) at 08:02

## 2022-02-05 RX ADMIN — FLUCONAZOLE 100 MG: 100 TABLET ORAL at 08:02

## 2022-02-05 RX ADMIN — AMPICILLIN 2 G: 2 INJECTION, POWDER, FOR SOLUTION INTRAMUSCULAR; INTRAVENOUS at 04:02

## 2022-02-05 RX ADMIN — PREDNISONE 5 MG: 5 TABLET ORAL at 08:02

## 2022-02-05 RX ADMIN — MELATONIN TAB 3 MG 6 MG: 3 TAB at 09:02

## 2022-02-05 RX ADMIN — IPRATROPIUM BROMIDE AND ALBUTEROL SULFATE 3 ML: 2.5; .5 SOLUTION RESPIRATORY (INHALATION) at 09:02

## 2022-02-05 RX ADMIN — APIXABAN 5 MG: 5 TABLET, FILM COATED ORAL at 09:02

## 2022-02-05 RX ADMIN — AMPICILLIN 2 G: 2 INJECTION, POWDER, FOR SOLUTION INTRAMUSCULAR; INTRAVENOUS at 01:02

## 2022-02-05 RX ADMIN — AMPICILLIN 2 G: 2 INJECTION, POWDER, FOR SOLUTION INTRAMUSCULAR; INTRAVENOUS at 07:02

## 2022-02-05 RX ADMIN — GUAIFENESIN AND DEXTROMETHORPHAN HYDROBROMIDE 1 TABLET: 600; 30 TABLET, EXTENDED RELEASE ORAL at 09:02

## 2022-02-05 RX ADMIN — AMPICILLIN 2 G: 2 INJECTION, POWDER, FOR SOLUTION INTRAMUSCULAR; INTRAVENOUS at 06:02

## 2022-02-05 RX ADMIN — PANTOPRAZOLE SODIUM 40 MG: 40 TABLET, DELAYED RELEASE ORAL at 08:02

## 2022-02-05 RX ADMIN — AMPICILLIN 2 G: 2 INJECTION, POWDER, FOR SOLUTION INTRAMUSCULAR; INTRAVENOUS at 08:02

## 2022-02-05 RX ADMIN — IPRATROPIUM BROMIDE AND ALBUTEROL SULFATE 3 ML: 2.5; .5 SOLUTION RESPIRATORY (INHALATION) at 02:02

## 2022-02-05 NOTE — ASSESSMENT & PLAN NOTE
- BCx 01/23- Enterococcus faecalis  - Repeat BCx 01/24- contaminant, otherwise NGTD  - Appreciate ID recs: 14d total course of antibiotics- EOT 02/07/22  - Continue amp 2g q4h  - Obtain weekly outpatient laboratory on Monday or Tuesday while on IV antibiotics: CBC, CMP      Fax laboratory results to [facility] at [phone] with attn: Dr. Porter     Outpatient Infectious Diseases clinic follow up will be arranged and found in patient calendar.     Prior to discharge, please ensure the patient's follow-up has been scheduled.  If there is still no follow-up scheduled in Infectious Diseases clinic, please send an EPIC message to Gisele Smith in Infectious Diseases.

## 2022-02-05 NOTE — ASSESSMENT & PLAN NOTE
- white plaques present w/in oropharynx initially- resolved  - no dysphagia or odoynophagia  - no concern for esophageal thrush  - continue treatment w/fluconazole for 7 vs 14 days total given improvement of oral lesions and elevated t bili- EOT 02/13

## 2022-02-05 NOTE — ASSESSMENT & PLAN NOTE
- Improving- likely 2/2 thrush- continuing w/fluconazole  - WBC improving   - Repeat BCx NGTG  - Resp Cx w/Candida albicans      39.4

## 2022-02-05 NOTE — ASSESSMENT & PLAN NOTE
- white plaques present w/in oropharynx initially- resolved  - no dysphagia or odoynophagia  - no concern for esophageal thrush  - continue treatment w/fluconazole for 7 vs 14 days total given improvement of oral lesions and elevated t bili- EOT 02/05

## 2022-02-05 NOTE — PROGRESS NOTES
[facility] - Telemetry [facility] Medicine  Progress Note    Patient Name: Selma Lux  MRN: [id number]  Patient Class: IP- Inpatient   Admission Date: 1/23/2022  Length of Stay: 13 days  Attending Physician: Kizzy Sandoval MD  Primary Care Provider: Bhargav Hirsch MD        Subjective:     Principal Problem:Acute hypoxemic respiratory failure        HPI:  Selma Lux is a 84 y.o. 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  She has had encephalopathy during her hospital stay and admission to rehab. She tested positive for COVID yesterday.  Paramedics state that she has been having very poor oral intake.      Collateral information from her daughter.  She notes that she has had difficulty breathing for several months.  She states she has cryptogenic pneumonia and received steroids and breathing treatments for this. 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  She noted that she had been on the ventilator and really hated being on the ventilator.  However, she would want to be intubated if her life depended on it.  She would want to try everything possible to avoid intubation.  She noted that she tested positive for COVID yesterday along with her mother.  She states that she is not able to come see her mother due to coronavirus.  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     ED course: Patient with positive COVID test and respiratory distress. This seems to be a recurrent problem  with her history of cryptogenic organizing pneumonia. Prior chest x-rays seems to have had a similar appearance to the x-ray from today.  Diffuse infiltrates.  This resolved on recent chest x-rays from her admission.  Of note this recent admission is under a different registration under the same name in epic.   Her venous blood gas does not show respiratory failure or CO2 retention. She seems to have encephalopathy but this has been an ongoing issue. Her sodium today is normalized. Patient has markedly infected urine and an elevated white count.  ED started broad-spectrum IV antibiotics.      Overview/Hospital Course:  Selma Lux is a 84 y.o. 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  She has had encephalopathy during her hospital stay and admission to rehab. She tested positive for COVID on 1/23.  Paramedics state that she has been having very poor oral intake. Patient was admitted to ICU for hypotension and respiratory distress, anticipating rapid deterioration 2/2 her medical history. Patient is on 4L NC, SpO2 stable, she had episode of hypotensions but not sustained, no pressor indicated. Mentation improved in the morning. Patient required sedation for procedure. Family visit seemed to calm her down. Patient no longer required Precedex gtt. Qtc prolongation resolved, likely 2/2 to chronic plaquenil. Patient sat well on room air. Patient ready to transfer to lower level of care unit. ID consulted. Maricruz and rocephin Dc'd 01/28. Transitioned to amp- completing 14d course. Found to have thrush w/up-trending WBC. Started on fluconazole 01/30.  Improvement in leukocytosis. Deemed stable for discharge to rehab 01/31.       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  She denies any acute complaints.  She notes that she misses writing; use the write poems.  She denies any other acute complaints. No nausea, vomiting, chills, dysuria, hematuria, abd pain, constipation.      Objective:     Vital Signs (Most Recent):  Temp: 99.4 °F (37.4 °C) (02/05/22 1137)  Pulse: 95 (02/05/22 1439)  Resp: (!) 22 (02/05/22 1439)  BP: (!) 122/58 (nurse notified) (02/05/22 1137)  SpO2: 100 % (02/05/22 1439) Vital Signs (24h Range):  Temp:  [97.8 °F (36.6 °C)-99.4 °F (37.4 °C)] 99.4 °F (37.4 °C)  Pulse:  [] 95  Resp:  [16-39] 22  SpO2:  [93 %-100 %] 100 %  BP: (121-128)/(58-78) 122/58     Weight: 68.5 kg (151 lb 0.2 oz)  Body mass index is 25.92 kg/m².    Intake/Output Summary (Last 24 hours) at 2/5/2022 1450  Last data filed at 2/5/2022 0615  Gross per 24 hour   Intake 1243.38 ml   Output 2 ml   Net 1241.38 ml     Physical Exam  Gen: in NAD, appears stated age, appears comfortable  Neuro: AAOx2, motor, sensory, and strength grossly intact BL  HEENT:  EOMI, PERRL, MMM- resolution of white plaques w/in oropharynx   CVS: RRR, no m/r/g; S1/S2 auscultated with no S3 or S4; capillary refill < 2 sec  Resp:  Clear breath sounds bilaterally, no wheezes or focal crackles, no belabored breathing or accessory muscle use appreciated   Abd: BS+ in all 4 quadrants; NTND, soft to palpation; no organomegaly appreciated  Extrem: pulses full, equal, and regular over all 4 extremities; edema of RUE-improved, no edema of other ext    Significant Labs:   All pertinent labs within the past 24 hours have been reviewed.  Blood Culture:   No results for input(s): LABBLOO in the last 48 hours.  CBC:   Recent Labs   Lab 02/04/22  0512   WBC 15.66*   HGB 9.6*   HCT 31.5*        CMP:   Recent Labs   Lab 02/04/22  0512 02/05/22  0400    136   K 3.3* 3.8    102   CO2 28  29   GLU 74 65*   BUN 14 10   CREATININE 0.7 0.6   CALCIUM 8.0* 7.6*   PROT 5.3* 4.8*   ALBUMIN 2.3* 2.0*   BILITOT 1.2* 1.3*   ALKPHOS 82 69   AST 16 15   ALT 25 20   ANIONGAP 8 5*   EGFRNONAA >60.0 >60.0     Urine Culture: No results for input(s): LABURIN in the last 48 hours.    Significant Imaging: I have reviewed all pertinent imaging results/findings within the past 24 hours.     TTE 01/27:  · The left ventricle is normal in size with concentric remodeling and normal systolic function. The estimated ejection fraction is 55%.  · Normal right ventricular size with normal right ventricular systolic function.  · Normal left ventricular diastolic function.  · Mild mitral regurgitation.  · No evidence of intracardiac vegetation    CXR:  FINDINGS:  Cardiac silhouette is unchanged.  Similar prominence of the upper mediastinum in this patient known goiter.  Scattered interstitial and airspace opacity with slight clearing at the right upper lobe and left base.  No significant pleural effusion or gross pneumothorax.  Advanced glenohumeral DJD.     Impression:     As above.      Assessment/Plan:      * Acute hypoxemic respiratory failure  - Resolved- appeared to be 2/2 CHF, COPD and Interstitial lung disease + COVID-19 infection  - Stable on room air    Serum total bilirubin elevated  See thrush      Moderate malnutrition  Registered dietitian following, appreciate recs      Thrush  - white plaques present w/in oropharynx initially- resolved  - no dysphagia or odoynophagia  - no concern for esophageal thrush  - continue treatment w/fluconazole for 7 vs 14 days total given improvement of oral lesions and elevated t bili- EOT 02/05    Enterococcal bacteremia  - BCx 01/23- Enterococcus faecalis  - Repeat BCx 01/24- contaminant, otherwise NGTD  - Appreciate ID recs: 14d total course of antibiotics- EOT 02/07/22  - Continue amp 2g q4h  - Obtain weekly outpatient laboratory on Monday or Tuesday while on IV antibiotics: CBC, CMP       Fax laboratory results to [facility] at [phone] with attn: Dr. Porter     Outpatient Infectious Diseases clinic follow up will be arranged and found in patient calendar.     Prior to discharge, please ensure the patient's follow-up has been scheduled.  If there is still no follow-up scheduled in Infectious Diseases clinic, please send an EPIC message to Gisele Smith in Infectious Diseases.    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   - Historically prescribe bactrim for PCP prophylaxis  - Dex completed 02/01- resume prednisone today- 5mg qday  - holding Plaquenil and cellcept    COPD exacerbation  - resolved  - continue duoneb q6h for sob/wheeze      COVID-19 virus infection  - Anticoagulation: eliquis  - Remdesivir x 5 days (1/23), holding plaquenil (QT prolongation and negative effect to remdesivir)   - Holding cellcept  - Dexamethasone 6mg x 10 days (1/23)-completion of dex 02/01- re-started prednisone 2/2  - on room air  - Continuous pulse ox  - Airborne + contact precautions    Chronic systolic (congestive) heart failure  - TTE 02/27/20:  Grade 1 diastolic dysfunction, EF 55%  - No exacerbation at this time  - Stable, continue GDMT    Leukocytosis  - Improving- likely 2/2 thrush- continuing w/fluconazole  - WBC improving   - Repeat BCx NGTG  - Resp Cx w/Candida albicans       Multinodular goiter  - Enlarged thyroid gland resulting in leftward deviation and mass effect upon the airway => anticipate difficult airway access   - Prior consultations with surgery, patient lost following up due to multiple recent medical events     UTI (urinary tract infection)  - UCx w/Ecoli 01/23- continue amp  - EOT 02/07/22      AF (paroxysmal atrial fibrillation)  - Continue Eliquis  - Recent head bleed 12/13  - CHADSVASC of 7    Oropharyngeal dysphagia  - Evaluated by SLP- diet recommendations- dental soft, think liquids  -  Aspiration precautions: 1 bite/sip at a time, Assistance with meals, Avoid talking while eating, Eliminate distractions, Feed only when awake/alert, HOB to 90 degrees, Meds whole 1 at a time, Remain upright 30 minutes post meal and Small bites/sips        Chronic renal failure syndrome, stage 3 (moderate)  - stable renal function   - strict I and O  - Continue PO lasix to 40mg qday      Long QT interval  - last ecg 01/30- 446  - resolving   - avoid QT prolonging agents if possible   - likely 2/2 plaquenil use  - continue to monitor QTc      Hypertension, essential  - BP currently well-controlled  - Holding home regimen of amlodipine   - Will continue to monitor and further titrate antihypertensives as clinically indicated       PUD (peptic ulcer disease)  - continue PPI      VTE Risk Mitigation (From admission, onward)         Ordered     apixaban tablet 5 mg  2 times daily         01/30/22 1420     IP VTE HIGH RISK PATIENT  Once         01/23/22 1259     Place sequential compression device  Until discontinued         01/23/22 1259                Discharge Planning   LETICIA: 2/7/2022     Code Status: Full Code   Is the patient medically ready for discharge?: Yes    Reason for patient still in hospital (select all that apply): Other (specify) Placement  Discharge Plan A: Rehab   Discharge Delays: None known at this time              Kizzy Sandoval MD  Department of Hospital Medicine   [facility] - Telemetry Stepdown

## 2022-02-05 NOTE — PLAN OF CARE
On call CM reached out to by team inquiring whether patient can go to Rehab today.  CM advised we are waiting to hear back from [facility].    10:20 CM in communication with [facility] liaison Brian Lombard who states they have filled their beds for the weekend and will not likely have a bed until Monday.

## 2022-02-05 NOTE — SUBJECTIVE & OBJECTIVE
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  She denies any acute complaints.  She notes that she misses writing; use the write poems.  She denies any other acute complaints. No nausea, vomiting, chills, dysuria, hematuria, abd pain, constipation.      Objective:     Vital Signs (Most Recent):  Temp: 99.4 °F (37.4 °C) (02/05/22 1137)  Pulse: 95 (02/05/22 1439)  Resp: (!) 22 (02/05/22 1439)  BP: (!) 122/58 (nurse notified) (02/05/22 1137)  SpO2: 100 % (02/05/22 1439) Vital Signs (24h Range):  Temp:  [97.8 °F (36.6 °C)-99.4 °F (37.4 °C)] 99.4 °F (37.4 °C)  Pulse:  [] 95  Resp:  [16-39] 22  SpO2:  [93 %-100 %] 100 %  BP: (121-128)/(58-78) 122/58     Weight: 68.5 kg (151 lb 0.2 oz)  Body mass index is 25.92 kg/m².    Intake/Output Summary (Last 24 hours) at 2/5/2022 1450  Last data filed at 2/5/2022 0615  Gross per 24 hour   Intake 1243.38 ml   Output 2 ml   Net 1241.38 ml     Physical Exam  Gen: in NAD, appears stated age, appears comfortable  Neuro: AAOx2, motor, sensory, and strength grossly intact BL  HEENT:  EOMI, PERRL, MMM- resolution of white plaques w/in oropharynx   CVS: RRR, no m/r/g; S1/S2 auscultated with no S3 or S4; capillary refill < 2 sec  Resp:  Clear breath sounds bilaterally, no wheezes or focal crackles, no belabored breathing or accessory muscle use appreciated   Abd: BS+ in all 4 quadrants; NTND, soft to palpation; no organomegaly appreciated  Extrem: pulses full, equal, and regular over all 4 extremities; edema of RUE-improved, no edema of other ext    Significant Labs:   All pertinent labs within the past 24 hours have been reviewed.  Blood Culture:   No results for input(s): LABBLOO in the last 48 hours.  CBC:   Recent Labs   Lab 02/04/22 0512   WBC 15.66*   HGB 9.6*   HCT 31.5*        CMP:   Recent Labs   Lab 02/04/22 0512 02/05/22  0400    136   K 3.3* 3.8    102   CO2 28 29   GLU 74 65*   BUN 14 10   CREATININE 0.7 0.6   CALCIUM 8.0* 7.6*   PROT 5.3*  4.8*   ALBUMIN 2.3* 2.0*   BILITOT 1.2* 1.3*   ALKPHOS 82 69   AST 16 15   ALT 25 20   ANIONGAP 8 5*   EGFRNONAA >60.0 >60.0     Urine Culture: No results for input(s): LABURIN in the last 48 hours.    Significant Imaging: I have reviewed all pertinent imaging results/findings within the past 24 hours.     TTE 01/27:  · The left ventricle is normal in size with concentric remodeling and normal systolic function. The estimated ejection fraction is 55%.  · Normal right ventricular size with normal right ventricular systolic function.  · Normal left ventricular diastolic function.  · Mild mitral regurgitation.  · No evidence of intracardiac vegetation    CXR:  FINDINGS:  Cardiac silhouette is unchanged.  Similar prominence of the upper mediastinum in this patient known goiter.  Scattered interstitial and airspace opacity with slight clearing at the right upper lobe and left base.  No significant pleural effusion or gross pneumothorax.  Advanced glenohumeral DJD.     Impression:     As above.

## 2022-02-06 LAB
ALBUMIN SERPL BCP-MCNC: 2.1 G/DL (ref 3.5–5.2)
ALP SERPL-CCNC: 72 U/L (ref 55–135)
ALT SERPL W/O P-5'-P-CCNC: 15 U/L (ref 10–44)
ANION GAP SERPL CALC-SCNC: 5 MMOL/L (ref 8–16)
AST SERPL-CCNC: 13 U/L (ref 10–40)
BILIRUB SERPL-MCNC: 1.4 MG/DL (ref 0.1–1)
BUN SERPL-MCNC: 10 MG/DL (ref 8–23)
CALCIUM SERPL-MCNC: 8 MG/DL (ref 8.7–10.5)
CHLORIDE SERPL-SCNC: 103 MMOL/L (ref 95–110)
CO2 SERPL-SCNC: 30 MMOL/L (ref 23–29)
CREAT SERPL-MCNC: 0.6 MG/DL (ref 0.5–1.4)
EST. GFR  (AFRICAN AMERICAN): >60 ML/MIN/1.73 M^2
EST. GFR  (NON AFRICAN AMERICAN): >60 ML/MIN/1.73 M^2
GLUCOSE SERPL-MCNC: 73 MG/DL (ref 70–110)
MAGNESIUM SERPL-MCNC: 1.7 MG/DL (ref 1.6–2.6)
PHOSPHATE SERPL-MCNC: 2.4 MG/DL (ref 2.7–4.5)
POTASSIUM SERPL-SCNC: 3.7 MMOL/L (ref 3.5–5.1)
PROT SERPL-MCNC: 5.3 G/DL (ref 6–8.4)
SODIUM SERPL-SCNC: 138 MMOL/L (ref 136–145)

## 2022-02-06 PROCEDURE — 94761 N-INVAS EAR/PLS OXIMETRY MLT: CPT

## 2022-02-06 PROCEDURE — 27000207 HC ISOLATION

## 2022-02-06 PROCEDURE — 25000003 PHARM REV CODE 250: Performed by: STUDENT IN AN ORGANIZED HEALTH CARE EDUCATION/TRAINING PROGRAM

## 2022-02-06 PROCEDURE — 20600001 HC STEP DOWN PRIVATE ROOM

## 2022-02-06 PROCEDURE — 99900035 HC TECH TIME PER 15 MIN (STAT)

## 2022-02-06 PROCEDURE — 99232 PR SUBSEQUENT HOSPITAL CARE,LEVL II: ICD-10-PCS | Mod: CR,,, | Performed by: STUDENT IN AN ORGANIZED HEALTH CARE EDUCATION/TRAINING PROGRAM

## 2022-02-06 PROCEDURE — 36415 COLL VENOUS BLD VENIPUNCTURE: CPT | Performed by: EMERGENCY MEDICINE

## 2022-02-06 PROCEDURE — 99232 SBSQ HOSP IP/OBS MODERATE 35: CPT | Mod: CR,,, | Performed by: STUDENT IN AN ORGANIZED HEALTH CARE EDUCATION/TRAINING PROGRAM

## 2022-02-06 PROCEDURE — 84100 ASSAY OF PHOSPHORUS: CPT | Performed by: EMERGENCY MEDICINE

## 2022-02-06 PROCEDURE — 25000003 PHARM REV CODE 250: Performed by: EMERGENCY MEDICINE

## 2022-02-06 PROCEDURE — 25000242 PHARM REV CODE 250 ALT 637 W/ HCPCS: Performed by: STUDENT IN AN ORGANIZED HEALTH CARE EDUCATION/TRAINING PROGRAM

## 2022-02-06 PROCEDURE — 80053 COMPREHEN METABOLIC PANEL: CPT | Performed by: EMERGENCY MEDICINE

## 2022-02-06 PROCEDURE — 63600175 PHARM REV CODE 636 W HCPCS: Performed by: STUDENT IN AN ORGANIZED HEALTH CARE EDUCATION/TRAINING PROGRAM

## 2022-02-06 PROCEDURE — 94760 N-INVAS EAR/PLS OXIMETRY 1: CPT

## 2022-02-06 PROCEDURE — 94640 AIRWAY INHALATION TREATMENT: CPT

## 2022-02-06 PROCEDURE — 83735 ASSAY OF MAGNESIUM: CPT | Performed by: EMERGENCY MEDICINE

## 2022-02-06 RX ORDER — FUROSEMIDE 20 MG/1
20 TABLET ORAL DAILY
Status: DISCONTINUED | OUTPATIENT
Start: 2022-02-06 | End: 2022-02-08

## 2022-02-06 RX ADMIN — GUAIFENESIN AND DEXTROMETHORPHAN HYDROBROMIDE 1 TABLET: 600; 30 TABLET, EXTENDED RELEASE ORAL at 08:02

## 2022-02-06 RX ADMIN — AMPICILLIN 2 G: 2 INJECTION, POWDER, FOR SOLUTION INTRAMUSCULAR; INTRAVENOUS at 04:02

## 2022-02-06 RX ADMIN — PANTOPRAZOLE SODIUM 40 MG: 40 TABLET, DELAYED RELEASE ORAL at 08:02

## 2022-02-06 RX ADMIN — AMPICILLIN 2 G: 2 INJECTION, POWDER, FOR SOLUTION INTRAMUSCULAR; INTRAVENOUS at 12:02

## 2022-02-06 RX ADMIN — AMPICILLIN 2 G: 2 INJECTION, POWDER, FOR SOLUTION INTRAMUSCULAR; INTRAVENOUS at 01:02

## 2022-02-06 RX ADMIN — APIXABAN 5 MG: 5 TABLET, FILM COATED ORAL at 08:02

## 2022-02-06 RX ADMIN — MELATONIN TAB 3 MG 6 MG: 3 TAB at 08:02

## 2022-02-06 RX ADMIN — AMPICILLIN 2 G: 2 INJECTION, POWDER, FOR SOLUTION INTRAMUSCULAR; INTRAVENOUS at 08:02

## 2022-02-06 RX ADMIN — IPRATROPIUM BROMIDE AND ALBUTEROL SULFATE 3 ML: 2.5; .5 SOLUTION RESPIRATORY (INHALATION) at 10:02

## 2022-02-06 RX ADMIN — PREDNISONE 5 MG: 5 TABLET ORAL at 08:02

## 2022-02-06 RX ADMIN — AMPICILLIN 2 G: 2 INJECTION, POWDER, FOR SOLUTION INTRAMUSCULAR; INTRAVENOUS at 10:02

## 2022-02-06 RX ADMIN — FUROSEMIDE 20 MG: 40 TABLET ORAL at 08:02

## 2022-02-06 RX ADMIN — AMPICILLIN 2 G: 2 INJECTION, POWDER, FOR SOLUTION INTRAMUSCULAR; INTRAVENOUS at 07:02

## 2022-02-06 RX ADMIN — IPRATROPIUM BROMIDE AND ALBUTEROL SULFATE 3 ML: 2.5; .5 SOLUTION RESPIRATORY (INHALATION) at 08:02

## 2022-02-06 NOTE — SUBJECTIVE & OBJECTIVE
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  She denies any acute complaints.  Cough is improved.  No nausea, vomiting, chills, dysuria, hematuria, abd pain, constipation.     Objective:     Vital Signs (Most Recent):  Temp: 98.3 °F (36.8 °C) (02/06/22 0724)  Pulse: 98 (02/06/22 0858)  Resp: 16 (02/06/22 0858)  BP: (!) 119/55 (02/06/22 0724)  SpO2: 98 % (02/06/22 0858) Vital Signs (24h Range):  Temp:  [97.8 °F (36.6 °C)-99.4 °F (37.4 °C)] 98.3 °F (36.8 °C)  Pulse:  [] 98  Resp:  [16-28] 16  SpO2:  [96 %-100 %] 98 %  BP: (109-149)/(55-85) 119/55     Weight: 68.5 kg (151 lb 0.2 oz)  Body mass index is 25.92 kg/m².    Intake/Output Summary (Last 24 hours) at 2/6/2022 1129  Last data filed at 2/6/2022 0533  Gross per 24 hour   Intake 600 ml   Output --   Net 600 ml     Physical Exam  Gen: in NAD, appears stated age, appears comfortable  Neuro: AAOx2, motor, sensory, and strength grossly intact BL  HEENT:  EOMI, PERRL, MMM- resolution of white plaques w/in oropharynx   CVS: RRR, no m/r/g; S1/S2 auscultated with no S3 or S4; capillary refill < 2 sec  Resp:  Clear breath sounds bilaterally, no wheezes or focal crackles, no belabored breathing or accessory muscle use appreciated   Abd: BS+ in all 4 quadrants; NTND, soft to palpation; no organomegaly appreciated  Extrem: pulses full, equal, and regular over all 4 extremities; edema of RUE-improved, no edema of other ext    Significant Labs:   All pertinent labs within the past 24 hours have been reviewed.  Blood Culture:   No results for input(s): LABBLOO in the last 48 hours.  CBC:   No results for input(s): WBC, HGB, HCT, PLT in the last 48 hours.  CMP:   Recent Labs   Lab 02/05/22  0400 02/06/22  0458    138   K 3.8 3.7    103   CO2 29 30*   GLU 65* 73   BUN 10 10   CREATININE 0.6 0.6   CALCIUM 7.6* 8.0*   PROT 4.8* 5.3*   ALBUMIN 2.0* 2.1*   BILITOT 1.3* 1.4*   ALKPHOS 69 72   AST 15 13   ALT 20 15   ANIONGAP 5* 5*   EGFRNONAA >60.0 >60.0      Urine Culture: No results for input(s): LABURIN in the last 48 hours.    Significant Imaging: I have reviewed all pertinent imaging results/findings within the past 24 hours.     TTE 01/27:  · The left ventricle is normal in size with concentric remodeling and normal systolic function. The estimated ejection fraction is 55%.  · Normal right ventricular size with normal right ventricular systolic function.  · Normal left ventricular diastolic function.  · Mild mitral regurgitation.  · No evidence of intracardiac vegetation    CXR:  FINDINGS:  Cardiac silhouette is unchanged.  Similar prominence of the upper mediastinum in this patient known goiter.  Scattered interstitial and airspace opacity with slight clearing at the right upper lobe and left base.  No significant pleural effusion or gross pneumothorax.  Advanced glenohumeral DJD.     Impression:     As above.

## 2022-02-06 NOTE — ASSESSMENT & PLAN NOTE
- BCx 01/23- Enterococcus faecalis  - Repeat BCx 01/24- contaminant, otherwise NGTD  - Appreciate ID recs: 14d total course of antibiotics- EOT 02/07/22  - Continue amp 2g q4h  - Obtain weekly outpatient laboratory on Monday or Tuesday while on IV antibiotics: CBC, CMP      Fax laboratory results to [facility] at [phone] with attn: Dr. Porter     Outpatient Infectious Diseases clinic follow up will be arranged and found in patient calendar.     Prior to discharge, please ensure the patient's follow-up has been scheduled.  If there is still no follow-up scheduled in Infectious Diseases clinic, please send an EPIC message to Gisele Smith in Infectious Diseases.

## 2022-02-06 NOTE — PLAN OF CARE
Went over POC with patient.  Questions asked and answered.  Stated understanding of POC.  Pt AAO x 4.  No complaints of pain throughout shift.  Pt's oxygen saturation remained between % on RA.  Weight shifted assistance given throughout night.  Will continue to monitor.

## 2022-02-06 NOTE — PROGRESS NOTES
[facility] - Telemetry [facility] Medicine  Progress Note    Patient Name: Selma Lux  MRN: [id number]  Patient Class: IP- Inpatient   Admission Date: 1/23/2022  Length of Stay: 14 days  Attending Physician: Kizzy Sandoval MD  Primary Care Provider: Bhargav Hirsch MD        Subjective:     Principal Problem:Acute hypoxemic respiratory failure        HPI:  Selma Lux is a 84 y.o. 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  She has had encephalopathy during her hospital stay and admission to rehab. She tested positive for COVID yesterday.  Paramedics state that she has been having very poor oral intake.      Collateral information from her daughter.  She notes that she has had difficulty breathing for several months.  She states she has cryptogenic pneumonia and received steroids and breathing treatments for this. 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  She noted that she had been on the ventilator and really hated being on the ventilator.  However, she would want to be intubated if her life depended on it.  She would want to try everything possible to avoid intubation.  She noted that she tested positive for COVID yesterday along with her mother.  She states that she is not able to come see her mother due to coronavirus.  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     ED course: Patient with positive COVID test and respiratory distress. This seems to be a recurrent problem  with her history of cryptogenic organizing pneumonia. Prior chest x-rays seems to have had a similar appearance to the x-ray from today.  Diffuse infiltrates.  This resolved on recent chest x-rays from her admission.  Of note this recent admission is under a different registration under the same name in epic.   Her venous blood gas does not show respiratory failure or CO2 retention. She seems to have encephalopathy but this has been an ongoing issue. Her sodium today is normalized. Patient has markedly infected urine and an elevated white count.  ED started broad-spectrum IV antibiotics.      Overview/Hospital Course:  Selma Lux is a 84 y.o. 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  She has had encephalopathy during her hospital stay and admission to rehab. She tested positive for COVID on 1/23.  Paramedics state that she has been having very poor oral intake. Patient was admitted to ICU for hypotension and respiratory distress, anticipating rapid deterioration 2/2 her medical history. Patient is on 4L NC, SpO2 stable, she had episode of hypotensions but not sustained, no pressor indicated. Mentation improved in the morning. Patient required sedation for procedure. Family visit seemed to calm her down. Patient no longer required Precedex gtt. Qtc prolongation resolved, likely 2/2 to chronic plaquenil. Patient sat well on room air. Patient ready to transfer to lower level of care unit. ID consulted. Maricruz and rocephin Dc'd 01/28. Transitioned to amp- completing 14d course. Found to have thrush w/up-trending WBC. Started on fluconazole 01/30.  Improvement in leukocytosis. Deemed stable for discharge to rehab 01/31.       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  She denies any acute complaints.  Cough is improved.  No nausea, vomiting, chills, dysuria, hematuria, abd pain, constipation.     Objective:     Vital Signs (Most Recent):  Temp: 98.3 °F (36.8 °C) (02/06/22 0724)  Pulse: 98 (02/06/22 0858)  Resp: 16 (02/06/22 0858)  BP: (!) 119/55 (02/06/22 0724)  SpO2: 98 % (02/06/22 0858) Vital Signs (24h Range):  Temp:  [97.8 °F (36.6 °C)-99.4 °F (37.4 °C)] 98.3 °F (36.8 °C)  Pulse:  [] 98  Resp:  [16-28] 16  SpO2:  [96 %-100 %] 98 %  BP: (109-149)/(55-85) 119/55     Weight: 68.5 kg (151 lb 0.2 oz)  Body mass index is 25.92 kg/m².    Intake/Output Summary (Last 24 hours) at 2/6/2022 1129  Last data filed at 2/6/2022 0533  Gross per 24 hour   Intake 600 ml   Output --   Net 600 ml     Physical Exam  Gen: in NAD, appears stated age, appears comfortable  Neuro: AAOx2, motor, sensory, and strength grossly intact BL  HEENT:  EOMI, PERRL, MMM- resolution of white plaques w/in oropharynx   CVS: RRR, no m/r/g; S1/S2 auscultated with no S3 or S4; capillary refill < 2 sec  Resp:  Clear breath sounds bilaterally, no wheezes or focal crackles, no belabored breathing or accessory muscle use appreciated   Abd: BS+ in all 4 quadrants; NTND, soft to palpation; no organomegaly appreciated  Extrem: pulses full, equal, and regular over all 4 extremities; edema of RUE-improved, no edema of other ext    Significant Labs:   All pertinent labs within the past 24 hours have been reviewed.  Blood Culture:   No results for input(s): LABBLOO in the last 48 hours.  CBC:   No results for input(s): WBC, HGB, HCT, PLT in the last 48 hours.  CMP:   Recent Labs   Lab 02/05/22  0400 02/06/22  0458    138   K 3.8 3.7    103   CO2 29 30*   GLU 65* 73   BUN 10 10   CREATININE 0.6 0.6   CALCIUM 7.6* 8.0*   PROT 4.8* 5.3*   ALBUMIN 2.0* 2.1*   BILITOT 1.3* 1.4*    ALKPHOS 69 72   AST 15 13   ALT 20 15   ANIONGAP 5* 5*   EGFRNONAA >60.0 >60.0     Urine Culture: No results for input(s): LABURIN in the last 48 hours.    Significant Imaging: I have reviewed all pertinent imaging results/findings within the past 24 hours.     TTE 01/27:  · The left ventricle is normal in size with concentric remodeling and normal systolic function. The estimated ejection fraction is 55%.  · Normal right ventricular size with normal right ventricular systolic function.  · Normal left ventricular diastolic function.  · Mild mitral regurgitation.  · No evidence of intracardiac vegetation    CXR:  FINDINGS:  Cardiac silhouette is unchanged.  Similar prominence of the upper mediastinum in this patient known goiter.  Scattered interstitial and airspace opacity with slight clearing at the right upper lobe and left base.  No significant pleural effusion or gross pneumothorax.  Advanced glenohumeral DJD.     Impression:     As above.      Assessment/Plan:      * Acute hypoxemic respiratory failure  - Resolved- appeared to be 2/2 CHF, COPD and Interstitial lung disease + COVID-19 infection  - Stable on room air    Serum total bilirubin elevated  See thrush      Moderate malnutrition  Registered dietitian following, appreciate recs      Thrush  - white plaques present w/in oropharynx initially- resolved  - no dysphagia or odoynophagia  - no concern for esophageal thrush  - continue treatment w/fluconazole for 7 vs 14 days total given improvement of oral lesions and elevated t bili- EOT 02/05    Enterococcal bacteremia  - BCx 01/23- Enterococcus faecalis  - Repeat BCx 01/24- contaminant, otherwise NGTD  - Appreciate ID recs: 14d total course of antibiotics- EOT 02/07/22  - Continue amp 2g q4h  - Obtain weekly outpatient laboratory on Monday or Tuesday while on IV antibiotics: CBC, CMP      Fax laboratory results to [facility] at [phone] with attn: Dr. Porter     Outpatient Infectious Diseases clinic  follow up will be arranged and found in patient calendar.     Prior to discharge, please ensure the patient's follow-up has been scheduled.  If there is still no follow-up scheduled in Infectious Diseases clinic, please send an EPIC message to Gisele Smith in Infectious Diseases.    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   - Historically prescribe bactrim for PCP prophylaxis  - Dex completed 02/01- resume prednisone today- 5mg qday  - holding Plaquenil and cellcept    COPD exacerbation  - resolved  - continue duoneb q6h for sob/wheeze      COVID-19 virus infection  - Anticoagulation: eliquis  - Remdesivir x 5 days (1/23), holding plaquenil (QT prolongation and negative effect to remdesivir)   - Holding cellcept  - Dexamethasone 6mg x 10 days (1/23)-completion of dex 02/01- re-started prednisone 2/2  - on room air  - Continuous pulse ox  - Airborne + contact precautions    Chronic systolic (congestive) heart failure  - TTE 02/27/20:  Grade 1 diastolic dysfunction, EF 55%  - No exacerbation at this time  - continue p.o. Lasix  - Stable, continue GDMT    Leukocytosis  - Improving- likely 2/2 thrush- continuing w/fluconazole  - WBC improving   - Repeat BCx NGTG  - Resp Cx w/Candida albicans       Multinodular goiter  - Enlarged thyroid gland resulting in leftward deviation and mass effect upon the airway => anticipate difficult airway access   - Prior consultations with surgery, patient lost following up due to multiple recent medical events     UTI (urinary tract infection)  - UCx w/Ecoli 01/23- continue amp  - EOT 02/07/22      AF (paroxysmal atrial fibrillation)  - Continue Eliquis  - Recent head bleed 12/13  - CHADSVASC of 7    Oropharyngeal dysphagia  - Evaluated by SLP- diet recommendations- dental soft, think liquids  - Aspiration precautions: 1 bite/sip at a time, Assistance with meals, Avoid talking while eating, Eliminate  distractions, Feed only when awake/alert, HOB to 90 degrees, Meds whole 1 at a time, Remain upright 30 minutes post meal and Small bites/sips        Chronic renal failure syndrome, stage 3 (moderate)  - stable renal function   - strict I and O  - Continue PO lasix      Long QT interval  - last ecg 01/30- 446  - resolving   - avoid QT prolonging agents if possible   - likely 2/2 plaquenil use  - continue to monitor QTc      Hypertension, essential  - BP currently well-controlled  - Holding home regimen of amlodipine   - Will continue to monitor and further titrate antihypertensives as clinically indicated       PUD (peptic ulcer disease)  - continue PPI        VTE Risk Mitigation (From admission, onward)         Ordered     apixaban tablet 5 mg  2 times daily         01/30/22 1420     IP VTE HIGH RISK PATIENT  Once         01/23/22 1259     Place sequential compression device  Until discontinued         01/23/22 1259                Discharge Planning   LETICIA: 2/7/2022     Code Status: Full Code   Is the patient medically ready for discharge?: Yes    Reason for patient still in hospital (select all that apply): Other (specify) Placement  Discharge Plan A: Rehab   Discharge Delays: None known at this time              Kizzy Sandoval MD  Department of Hospital Medicine   [facility] - Telemetry Stepdown

## 2022-02-06 NOTE — ASSESSMENT & PLAN NOTE
- TTE 02/27/20:  Grade 1 diastolic dysfunction, EF 55%  - No exacerbation at this time  - continue p.o. Lasix  - Stable, continue GDMT

## 2022-02-07 LAB
ALBUMIN SERPL BCP-MCNC: 2.1 G/DL (ref 3.5–5.2)
ALP SERPL-CCNC: 88 U/L (ref 55–135)
ALT SERPL W/O P-5'-P-CCNC: 16 U/L (ref 10–44)
ANION GAP SERPL CALC-SCNC: 10 MMOL/L (ref 8–16)
AST SERPL-CCNC: 17 U/L (ref 10–40)
BILIRUB SERPL-MCNC: 1.3 MG/DL (ref 0.1–1)
BUN SERPL-MCNC: 11 MG/DL (ref 8–23)
CALCIUM SERPL-MCNC: 8 MG/DL (ref 8.7–10.5)
CHLORIDE SERPL-SCNC: 105 MMOL/L (ref 95–110)
CO2 SERPL-SCNC: 22 MMOL/L (ref 23–29)
CREAT SERPL-MCNC: 0.6 MG/DL (ref 0.5–1.4)
EST. GFR  (AFRICAN AMERICAN): >60 ML/MIN/1.73 M^2
EST. GFR  (NON AFRICAN AMERICAN): >60 ML/MIN/1.73 M^2
GLUCOSE SERPL-MCNC: 62 MG/DL (ref 70–110)
MAGNESIUM SERPL-MCNC: 1.7 MG/DL (ref 1.6–2.6)
PHOSPHATE SERPL-MCNC: 2.1 MG/DL (ref 2.7–4.5)
POTASSIUM SERPL-SCNC: 3.8 MMOL/L (ref 3.5–5.1)
PROT SERPL-MCNC: 5.7 G/DL (ref 6–8.4)
SODIUM SERPL-SCNC: 137 MMOL/L (ref 136–145)

## 2022-02-07 PROCEDURE — 97530 THERAPEUTIC ACTIVITIES: CPT

## 2022-02-07 PROCEDURE — 25000003 PHARM REV CODE 250: Performed by: STUDENT IN AN ORGANIZED HEALTH CARE EDUCATION/TRAINING PROGRAM

## 2022-02-07 PROCEDURE — 83735 ASSAY OF MAGNESIUM: CPT | Performed by: EMERGENCY MEDICINE

## 2022-02-07 PROCEDURE — 36415 COLL VENOUS BLD VENIPUNCTURE: CPT | Performed by: EMERGENCY MEDICINE

## 2022-02-07 PROCEDURE — 99232 SBSQ HOSP IP/OBS MODERATE 35: CPT | Mod: CR,,, | Performed by: STUDENT IN AN ORGANIZED HEALTH CARE EDUCATION/TRAINING PROGRAM

## 2022-02-07 PROCEDURE — 97110 THERAPEUTIC EXERCISES: CPT

## 2022-02-07 PROCEDURE — 84100 ASSAY OF PHOSPHORUS: CPT | Performed by: EMERGENCY MEDICINE

## 2022-02-07 PROCEDURE — 99232 PR SUBSEQUENT HOSPITAL CARE,LEVL II: ICD-10-PCS | Mod: CR,,, | Performed by: STUDENT IN AN ORGANIZED HEALTH CARE EDUCATION/TRAINING PROGRAM

## 2022-02-07 PROCEDURE — 25000242 PHARM REV CODE 250 ALT 637 W/ HCPCS: Performed by: STUDENT IN AN ORGANIZED HEALTH CARE EDUCATION/TRAINING PROGRAM

## 2022-02-07 PROCEDURE — 80053 COMPREHEN METABOLIC PANEL: CPT | Performed by: EMERGENCY MEDICINE

## 2022-02-07 PROCEDURE — 25000003 PHARM REV CODE 250: Performed by: HOSPITALIST

## 2022-02-07 PROCEDURE — 20600001 HC STEP DOWN PRIVATE ROOM

## 2022-02-07 PROCEDURE — 27000207 HC ISOLATION

## 2022-02-07 PROCEDURE — 99900035 HC TECH TIME PER 15 MIN (STAT)

## 2022-02-07 PROCEDURE — 94640 AIRWAY INHALATION TREATMENT: CPT

## 2022-02-07 PROCEDURE — 63600175 PHARM REV CODE 636 W HCPCS: Performed by: STUDENT IN AN ORGANIZED HEALTH CARE EDUCATION/TRAINING PROGRAM

## 2022-02-07 PROCEDURE — 94761 N-INVAS EAR/PLS OXIMETRY MLT: CPT

## 2022-02-07 PROCEDURE — 25000003 PHARM REV CODE 250: Performed by: EMERGENCY MEDICINE

## 2022-02-07 RX ADMIN — IPRATROPIUM BROMIDE AND ALBUTEROL SULFATE 3 ML: 2.5; .5 SOLUTION RESPIRATORY (INHALATION) at 08:02

## 2022-02-07 RX ADMIN — MELATONIN TAB 3 MG 6 MG: 3 TAB at 08:02

## 2022-02-07 RX ADMIN — PREDNISONE 5 MG: 5 TABLET ORAL at 09:02

## 2022-02-07 RX ADMIN — FUROSEMIDE 20 MG: 40 TABLET ORAL at 09:02

## 2022-02-07 RX ADMIN — AMPICILLIN 2 G: 2 INJECTION, POWDER, FOR SOLUTION INTRAMUSCULAR; INTRAVENOUS at 03:02

## 2022-02-07 RX ADMIN — GUAIFENESIN AND DEXTROMETHORPHAN HYDROBROMIDE 1 TABLET: 600; 30 TABLET, EXTENDED RELEASE ORAL at 08:02

## 2022-02-07 RX ADMIN — AMPICILLIN 2 G: 2 INJECTION, POWDER, FOR SOLUTION INTRAMUSCULAR; INTRAVENOUS at 07:02

## 2022-02-07 RX ADMIN — AMPICILLIN 2 G: 2 INJECTION, POWDER, FOR SOLUTION INTRAMUSCULAR; INTRAVENOUS at 06:02

## 2022-02-07 RX ADMIN — APIXABAN 5 MG: 5 TABLET, FILM COATED ORAL at 09:02

## 2022-02-07 RX ADMIN — IPRATROPIUM BROMIDE AND ALBUTEROL SULFATE 3 ML: 2.5; .5 SOLUTION RESPIRATORY (INHALATION) at 01:02

## 2022-02-07 RX ADMIN — APIXABAN 5 MG: 5 TABLET, FILM COATED ORAL at 08:02

## 2022-02-07 RX ADMIN — AMPICILLIN 2 G: 2 INJECTION, POWDER, FOR SOLUTION INTRAMUSCULAR; INTRAVENOUS at 02:02

## 2022-02-07 RX ADMIN — AMPICILLIN 2 G: 2 INJECTION, POWDER, FOR SOLUTION INTRAMUSCULAR; INTRAVENOUS at 11:02

## 2022-02-07 RX ADMIN — PANTOPRAZOLE SODIUM 40 MG: 40 TABLET, DELAYED RELEASE ORAL at 09:02

## 2022-02-07 RX ADMIN — GUAIFENESIN AND DEXTROMETHORPHAN HYDROBROMIDE 1 TABLET: 600; 30 TABLET, EXTENDED RELEASE ORAL at 09:02

## 2022-02-07 RX ADMIN — ACETAMINOPHEN 650 MG: 325 TABLET ORAL at 07:02

## 2022-02-07 NOTE — PLAN OF CARE
Went over POC with patient.  Questions asked and answered.  Stated understanding of POC.  Pt AAO x 4.  No complaints of pain throughout shift.  Pt's oxygen saturation remained 90% or greater on RA.  Weight shifted assistance given throughout night.  Will continue to monitor.

## 2022-02-07 NOTE — PLAN OF CARE
[facility].  Per [facility] Team, pt will need to to isolate 20 days before she can transfer to [facility].      EBONIE telephoned pt's daughter, Susu [phone], to discuss discharge plan and provide an update.  SW notified pt's daughter of above information and offered to reach out to other in rehab facilities for placement.  Pt's daughter stated it is not acceptable that pt can not return to [facility].  [facility] and leadership team of the above information.  EBONIE will continue to follow.    3:00 PM   EBONIE spoke to pt's daughter, Susu [phone], in reference to alternate d/c plan to home with HH and when pt has met requirements for quarantine she can admit to [facility] on 02/13.  Pt's daughter stated she is unable to take pt home with HH services at this time.  Pt's daughter will consider [facility] for placement if [facility] is unable to accept.        EBONIE left a message for Tr[facility]/ [facility] to see if they are able to admit pt.  EBONIE will continue to follow.      Cailin Subramanian LMSW  PRN-  [facility]  Ext. [phone]

## 2022-02-07 NOTE — PROGRESS NOTES
[facility] - Telemetry [facility] Medicine  Progress Note    Patient Name: Selma Lux  MRN: [id number]  Patient Class: IP- Inpatient   Admission Date: 1/23/2022  Length of Stay: 15 days  Attending Physician: Kizzy Sandoval MD  Primary Care Provider: Bhargav Hirsch MD        Subjective:     Principal Problem:Acute hypoxemic respiratory failure        HPI:  Selma Lux is a 84 y.o. 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  She has had encephalopathy during her hospital stay and admission to rehab. She tested positive for COVID yesterday.  Paramedics state that she has been having very poor oral intake.      Collateral information from her daughter.  She notes that she has had difficulty breathing for several months.  She states she has cryptogenic pneumonia and received steroids and breathing treatments for this. 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  She noted that she had been on the ventilator and really hated being on the ventilator.  However, she would want to be intubated if her life depended on it.  She would want to try everything possible to avoid intubation.  She noted that she tested positive for COVID yesterday along with her mother.  She states that she is not able to come see her mother due to coronavirus.  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     ED course: Patient with positive COVID test and respiratory distress. This seems to be a recurrent problem  with her history of cryptogenic organizing pneumonia. Prior chest x-rays seems to have had a similar appearance to the x-ray from today.  Diffuse infiltrates.  This resolved on recent chest x-rays from her admission.  Of note this recent admission is under a different registration under the same name in epic.   Her venous blood gas does not show respiratory failure or CO2 retention. She seems to have encephalopathy but this has been an ongoing issue. Her sodium today is normalized. Patient has markedly infected urine and an elevated white count.  ED started broad-spectrum IV antibiotics.      Overview/Hospital Course:  Selma Lux is a 84 y.o. 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  She has had encephalopathy during her hospital stay and admission to rehab. She tested positive for COVID on 1/23.  Paramedics state that she has been having very poor oral intake. Patient was admitted to ICU for hypotension and respiratory distress, anticipating rapid deterioration 2/2 her medical history. Patient is on 4L NC, SpO2 stable, she had episode of hypotensions but not sustained, no pressor indicated. Mentation improved in the morning. Patient required sedation for procedure. Family visit seemed to calm her down. Patient no longer required Precedex gtt. Qtc prolongation resolved, likely 2/2 to chronic plaquenil. Patient sat well on room air. Patient ready to transfer to lower level of care unit. ID consulted. Maricruz and rocephin Dc'd 01/28. Transitioned to amp- completing 14d course. Found to have thrush w/up-trending WBC. Started on fluconazole 01/30.  Improvement in leukocytosis. Deemed stable for discharge to rehab 01/31.       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  She denies any acute complaints.  Cough is improved.  No nausea, vomiting, chills, dysuria, hematuria, abd pain, constipation.     Objective:     Vital Signs (Most Recent):  Temp: 98.1 °F (36.7 °C) (02/07/22 1508)  Pulse: 102 (02/07/22 1508)  Resp: 18 (02/07/22 1508)  BP: (!) 130/58 (02/07/22 1508)  SpO2: 100 % (02/07/22 1508) Vital Signs (24h Range):  Temp:  [97.7 °F (36.5 °C)-98.4 °F (36.9 °C)] 98.1 °F (36.7 °C)  Pulse:  [] 102  Resp:  [16-24] 18  SpO2:  [91 %-100 %] 100 %  BP: (108-152)/(51-92) 130/58     Weight: 68.5 kg (151 lb 0.2 oz)  Body mass index is 25.92 kg/m².    Intake/Output Summary (Last 24 hours) at 2/7/2022 1519  Last data filed at 2/7/2022 1116  Gross per 24 hour   Intake 450 ml   Output 1800 ml   Net -1350 ml     Physical Exam  Gen: in NAD, appears stated age, appears comfortable  Neuro: AAOx2, motor, sensory, and strength grossly intact BL  HEENT:  EOMI, PERRL, MMM- resolution of white plaques w/in oropharynx   CVS: RRR, no m/r/g; S1/S2 auscultated with no S3 or S4; capillary refill < 2 sec  Resp:  Clear breath sounds bilaterally, no wheezes or focal crackles, no belabored breathing or accessory muscle use appreciated   Abd: BS+ in all 4 quadrants; NTND, soft to palpation; no organomegaly appreciated  Extrem: pulses full, equal, and regular over all 4 extremities; edema of RUE-improved, no edema of other ext    Significant Labs:   All pertinent labs within the past 24 hours have been reviewed.  Blood Culture:   No results for input(s): LABBLOO in the last 48 hours.  CBC:   No results for input(s): WBC, HGB, HCT, PLT in the last 48 hours.  CMP:   Recent Labs   Lab 02/06/22  0458 02/07/22  0530    137   K 3.7 3.8    105   CO2 30* 22*   GLU 73 62*   BUN 10 11   CREATININE 0.6 0.6   CALCIUM 8.0* 8.0*   PROT 5.3* 5.7*   ALBUMIN 2.1* 2.1*   BILITOT  1.4* 1.3*   ALKPHOS 72 88   AST 13 17   ALT 15 16   ANIONGAP 5* 10   EGFRNONAA >60.0 >60.0     Urine Culture: No results for input(s): LABURIN in the last 48 hours.    Significant Imaging: I have reviewed all pertinent imaging results/findings within the past 24 hours.     TTE 01/27:  · The left ventricle is normal in size with concentric remodeling and normal systolic function. The estimated ejection fraction is 55%.  · Normal right ventricular size with normal right ventricular systolic function.  · Normal left ventricular diastolic function.  · Mild mitral regurgitation.  · No evidence of intracardiac vegetation    CXR:  FINDINGS:  Cardiac silhouette is unchanged.  Similar prominence of the upper mediastinum in this patient known goiter.  Scattered interstitial and airspace opacity with slight clearing at the right upper lobe and left base.  No significant pleural effusion or gross pneumothorax.  Advanced glenohumeral DJD.     Impression:     As above.      Assessment/Plan:      * Acute hypoxemic respiratory failure  - Resolved- appeared to be 2/2 CHF, COPD and Interstitial lung disease + COVID-19 infection  - Stable on room air    Serum total bilirubin elevated  See thrush      Moderate malnutrition  Registered dietitian following, appreciate recs      Thrush  - white plaques present w/in oropharynx initially- resolved  - no dysphagia or odoynophagia  - no concern for esophageal thrush  - continue treatment w/fluconazole for 7 vs 14 days total given improvement of oral lesions and elevated t bili- EOT 02/05    Enterococcal bacteremia  - BCx 01/23- Enterococcus faecalis  - Repeat BCx 01/24- contaminant, otherwise NGTD  - Appreciate ID recs: 14d total course of antibiotics- EOT 02/07/22  - Continue amp 2g q4h  - Obtain weekly outpatient laboratory on Monday or Tuesday while on IV antibiotics: CBC, CMP      Fax laboratory results to [facility] at [phone] with attn: Dr. Porter     Outpatient Infectious  Diseases clinic follow up will be arranged and found in patient calendar.     Prior to discharge, please ensure the patient's follow-up has been scheduled.  If there is still no follow-up scheduled in Infectious Diseases clinic, please send an EPIC message to Gisele Smith in Infectious Diseases.    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   - Historically prescribe bactrim for PCP prophylaxis  - Dex completed 02/01- resume prednisone today- 5mg qday  - holding Plaquenil and cellcept    COPD exacerbation  - resolved  - continue duoneb q6h for sob/wheeze      COVID-19 virus infection  - Anticoagulation: eliquis  - Remdesivir x 5 days (1/23), holding plaquenil (QT prolongation and negative effect to remdesivir)   - Holding cellcept  - Dexamethasone 6mg x 10 days (1/23)-completion of dex 02/01- re-started prednisone 2/2  - on room air  - Continuous pulse ox  - Airborne + contact precautions    Chronic systolic (congestive) heart failure  - TTE 02/27/20:  Grade 1 diastolic dysfunction, EF 55%  - No exacerbation at this time  - continue p.o. Lasix  - Stable, continue GDMT    Leukocytosis  - Improving- likely 2/2 thrush- continuing w/fluconazole  - WBC improving   - Repeat BCx NGTG  - Resp Cx w/Candida albicans       Multinodular goiter  - Enlarged thyroid gland resulting in leftward deviation and mass effect upon the airway => anticipate difficult airway access   - Prior consultations with surgery, patient lost following up due to multiple recent medical events     UTI (urinary tract infection)  - UCx w/Ecoli 01/23- continue amp  - EOT 02/07/22      AF (paroxysmal atrial fibrillation)  - Continue Eliquis  - Recent head bleed 12/13  - CHADSVASC of 7    Oropharyngeal dysphagia  - Evaluated by SLP- diet recommendations- dental soft, think liquids  - Aspiration precautions: 1 bite/sip at a time, Assistance with meals, Avoid talking while  eating, Eliminate distractions, Feed only when awake/alert, HOB to 90 degrees, Meds whole 1 at a time, Remain upright 30 minutes post meal and Small bites/sips        Chronic renal failure syndrome, stage 3 (moderate)  - stable renal function   - strict I and O  - Continue PO lasix      Long QT interval  - last ecg 01/30- 446  - resolving   - avoid QT prolonging agents if possible   - likely 2/2 plaquenil use  - continue to monitor QTc      Hypertension, essential  - BP currently well-controlled  - Holding home regimen of amlodipine   - Will continue to monitor and further titrate antihypertensives as clinically indicated       PUD (peptic ulcer disease)  - continue PPI        VTE Risk Mitigation (From admission, onward)         Ordered     apixaban tablet 5 mg  2 times daily         01/30/22 1420     IP VTE HIGH RISK PATIENT  Once         01/23/22 1259     Place sequential compression device  Until discontinued         01/23/22 1259                Discharge Planning   LETICIA: 2/7/2022     Code Status: Full Code   Is the patient medically ready for discharge?: Yes    Reason for patient still in hospital (select all that apply): Other (specify) Placement  Discharge Plan A: Rehab   Discharge Delays: None known at this time              Kizzy Sandoval MD  Department of Hospital Medicine   [facility] - Telemetry Stepdown

## 2022-02-07 NOTE — PT/OT/SLP PROGRESS
"Physical Therapy Treatment    Patient Name:  Selma Lux   MRN:  [id number]    Recommendations:     Discharge Recommendations:  rehabilitation facility   Discharge Equipment Recommendations:  (TBD pending pt progress)   Barriers to discharge: Inaccessible home and Decreased caregiver support    Assessment:     Selma Lux is a 86 y.o. female admitted with a medical diagnosis of Acute hypoxemic respiratory failure.  She presents with the following impairments/functional limitations:  weakness,impaired endurance,impaired self care skills,impaired functional mobilty,gait instability,impaired balance,decreased upper extremity function,decreased lower extremity function,impaired cardiopulmonary response to activity. Pt progressed to standing trials and sitting up in chair this date. Pt performed 5 STS with max A. Pt desatted to the low 80s during session but returned to 99% in less than 1 min. Pt would continue to benefit from acute care PT services.    Rehab Prognosis: Good; patient would benefit from acute skilled PT services to address these deficits and reach maximum level of function.    Recent Surgery: * No surgery found *      Plan:     During this hospitalization, patient to be seen 4 x/week to address the identified rehab impairments via gait training,therapeutic activities,therapeutic exercises,neuromuscular re-education and progress toward the following goals:    · Plan of Care Expires:  02/22/22    Subjective     Chief Complaint: Pt reports she is tired of being here.  Patient/Family Comments/goals: "Lets do one more stand."  Pain/Comfort:  · Pain Rating 1: 0/10  · Pain Rating Post-Intervention 1: 0/10      Objective:     Communicated with RN prior to session.  Patient found up in chair with telemetry,pulse ox (continuous),peripheral IV,PureWick upon PT entry to room.     General Precautions: Standard, airborne,contact,droplet,fall   Orthopedic Precautions:N/A   Braces: N/A  Respiratory Status: " Room air     Functional Mobility:  · Bed Mobility:   Not appropriate this date  · Transfers:     · Sit to Stand:  maximal assistance with no AD, x5 trials with rest breaks between, BLEs blocked. Pt with difficulty achieving full upright and full hip and knee extension requiring PT assistance.  · Gait: N/T  · Balance:   · Static Sitting: SBA-Tiff with fatigue  · Dynamic Sitting: N/T  · Static Standing: max A, ~5 -10 secs each      AM-PAC 6 CLICK MOBILITY  Turning over in bed (including adjusting bedclothes, sheets and blankets)?: 3  Sitting down on and standing up from a chair with arms (e.g., wheelchair, bedside commode, etc.): 2  Moving from lying on back to sitting on the side of the bed?: 2  Moving to and from a bed to a chair (including a wheelchair)?: 2  Need to walk in hospital room?: 1  Climbing 3-5 steps with a railing?: 1  Basic Mobility Total Score: 11       Therapeutic Activities and Exercises:   Patient educated on role of therapy, goals of session, and benefits of mobilizing.   Discussed PT plan of care during hospitalization.   Patient educated on calling for assistance.   Patient educated on how their diagnosis impacts their mobility within PT scope of practice.   Communication board up to date.  All questions answered within PT scope of practice.  Patient also educated and instructed on therapeutic exercises. Therapeutic exercises included the following:  Long Arc Quads, Seated marches (Single leg). Pt performed 1x10 on BLEs with verbal/tactile cuing or assistance as needed.    Patient left up in chair with all lines intact, call button in reach, RN notified and pt's daughter present.    GOALS:   Multidisciplinary Problems     Physical Therapy Goals        Problem: Physical Therapy Goal    Goal Priority Disciplines Outcome Goal Variances Interventions   Physical Therapy Goal     PT, PT/OT Ongoing, Progressing     Description: Goals to be met by: 2/14/22    Patient will increase functional  independence with mobility by performin. Supine to sit with MInimal Assistance -not met  2. Sit to stand transfer with Minimal Assistance with RW - not met  3. Bed to chair transfer with Minimal Assistance using Rolling Walker -not met  4. Gait  x 20 feet with Moderate Assistance using Rolling Walker. -not met  5. Ascend/descend 5 stair with right Handrails Moderate Assistance -not met  6. Sitting at edge of bed x10 minutes with Contact Guard Assistance to perform activities and increase endurance - not met                     Time Tracking:     PT Received On: 22  PT Start Time: 1547     PT Stop Time: 1614  PT Total Time (min): 27 min     Billable Minutes: Therapeutic Activity 17 and Therapeutic Exercise 10    Treatment Type: Treatment  PT/PTA: PT     PTA Visit Number: 0     2022

## 2022-02-07 NOTE — SUBJECTIVE & OBJECTIVE
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  She denies any acute complaints.  Cough is improved.  No nausea, vomiting, chills, dysuria, hematuria, abd pain, constipation.     Objective:     Vital Signs (Most Recent):  Temp: 98.1 °F (36.7 °C) (02/07/22 1508)  Pulse: 102 (02/07/22 1508)  Resp: 18 (02/07/22 1508)  BP: (!) 130/58 (02/07/22 1508)  SpO2: 100 % (02/07/22 1508) Vital Signs (24h Range):  Temp:  [97.7 °F (36.5 °C)-98.4 °F (36.9 °C)] 98.1 °F (36.7 °C)  Pulse:  [] 102  Resp:  [16-24] 18  SpO2:  [91 %-100 %] 100 %  BP: (108-152)/(51-92) 130/58     Weight: 68.5 kg (151 lb 0.2 oz)  Body mass index is 25.92 kg/m².    Intake/Output Summary (Last 24 hours) at 2/7/2022 1519  Last data filed at 2/7/2022 1116  Gross per 24 hour   Intake 450 ml   Output 1800 ml   Net -1350 ml     Physical Exam  Gen: in NAD, appears stated age, appears comfortable  Neuro: AAOx2, motor, sensory, and strength grossly intact BL  HEENT:  EOMI, PERRL, MMM- resolution of white plaques w/in oropharynx   CVS: RRR, no m/r/g; S1/S2 auscultated with no S3 or S4; capillary refill < 2 sec  Resp:  Clear breath sounds bilaterally, no wheezes or focal crackles, no belabored breathing or accessory muscle use appreciated   Abd: BS+ in all 4 quadrants; NTND, soft to palpation; no organomegaly appreciated  Extrem: pulses full, equal, and regular over all 4 extremities; edema of RUE-improved, no edema of other ext    Significant Labs:   All pertinent labs within the past 24 hours have been reviewed.  Blood Culture:   No results for input(s): LABBLOO in the last 48 hours.  CBC:   No results for input(s): WBC, HGB, HCT, PLT in the last 48 hours.  CMP:   Recent Labs   Lab 02/06/22  0458 02/07/22  0530    137   K 3.7 3.8    105   CO2 30* 22*   GLU 73 62*   BUN 10 11   CREATININE 0.6 0.6   CALCIUM 8.0* 8.0*   PROT 5.3* 5.7*   ALBUMIN 2.1* 2.1*   BILITOT 1.4* 1.3*   ALKPHOS 72 88   AST 13 17   ALT 15 16   ANIONGAP 5* 10   EGFRNONAA  >60.0 >60.0     Urine Culture: No results for input(s): LABURIN in the last 48 hours.    Significant Imaging: I have reviewed all pertinent imaging results/findings within the past 24 hours.     TTE 01/27:  · The left ventricle is normal in size with concentric remodeling and normal systolic function. The estimated ejection fraction is 55%.  · Normal right ventricular size with normal right ventricular systolic function.  · Normal left ventricular diastolic function.  · Mild mitral regurgitation.  · No evidence of intracardiac vegetation    CXR:  FINDINGS:  Cardiac silhouette is unchanged.  Similar prominence of the upper mediastinum in this patient known goiter.  Scattered interstitial and airspace opacity with slight clearing at the right upper lobe and left base.  No significant pleural effusion or gross pneumothorax.  Advanced glenohumeral DJD.     Impression:     As above.

## 2022-02-08 VITALS
OXYGEN SATURATION: 100 % | TEMPERATURE: 98 F | HEART RATE: 113 BPM | RESPIRATION RATE: 20 BRPM | HEIGHT: 64 IN | DIASTOLIC BLOOD PRESSURE: 58 MMHG | WEIGHT: 151 LBS | BODY MASS INDEX: 25.78 KG/M2 | SYSTOLIC BLOOD PRESSURE: 123 MMHG

## 2022-02-08 LAB
ALBUMIN SERPL BCP-MCNC: 2.3 G/DL (ref 3.5–5.2)
ALP SERPL-CCNC: 75 U/L (ref 55–135)
ALT SERPL W/O P-5'-P-CCNC: 17 U/L (ref 10–44)
ANION GAP SERPL CALC-SCNC: 13 MMOL/L (ref 8–16)
ANISOCYTOSIS BLD QL SMEAR: SLIGHT
AST SERPL-CCNC: 22 U/L (ref 10–40)
BASOPHILS # BLD AUTO: 0.01 K/UL (ref 0–0.2)
BASOPHILS NFR BLD: 0.1 % (ref 0–1.9)
BILIRUB SERPL-MCNC: 1.1 MG/DL (ref 0.1–1)
BUN SERPL-MCNC: 9 MG/DL (ref 8–23)
BURR CELLS BLD QL SMEAR: ABNORMAL
CALCIUM SERPL-MCNC: 8.2 MG/DL (ref 8.7–10.5)
CHLORIDE SERPL-SCNC: 105 MMOL/L (ref 95–110)
CO2 SERPL-SCNC: 20 MMOL/L (ref 23–29)
CREAT SERPL-MCNC: 0.6 MG/DL (ref 0.5–1.4)
DIFFERENTIAL METHOD: ABNORMAL
EOSINOPHIL # BLD AUTO: 0.4 K/UL (ref 0–0.5)
EOSINOPHIL NFR BLD: 3.5 % (ref 0–8)
ERYTHROCYTE [DISTWIDTH] IN BLOOD BY AUTOMATED COUNT: 20.2 % (ref 11.5–14.5)
EST. GFR  (AFRICAN AMERICAN): >60 ML/MIN/1.73 M^2
EST. GFR  (NON AFRICAN AMERICAN): >60 ML/MIN/1.73 M^2
GLUCOSE SERPL-MCNC: 55 MG/DL (ref 70–110)
HCT VFR BLD AUTO: 32.1 % (ref 37–48.5)
HGB BLD-MCNC: 9.3 G/DL (ref 12–16)
HYPOCHROMIA BLD QL SMEAR: ABNORMAL
IMM GRANULOCYTES # BLD AUTO: 0.07 K/UL (ref 0–0.04)
IMM GRANULOCYTES NFR BLD AUTO: 0.7 % (ref 0–0.5)
LYMPHOCYTES # BLD AUTO: 1.9 K/UL (ref 1–4.8)
LYMPHOCYTES NFR BLD: 19.1 % (ref 18–48)
MAGNESIUM SERPL-MCNC: 1.7 MG/DL (ref 1.6–2.6)
MCH RBC QN AUTO: 28.4 PG (ref 27–31)
MCHC RBC AUTO-ENTMCNC: 29 G/DL (ref 32–36)
MCV RBC AUTO: 98 FL (ref 82–98)
MONOCYTES # BLD AUTO: 2.6 K/UL (ref 0.3–1)
MONOCYTES NFR BLD: 25.7 % (ref 4–15)
NEUTROPHILS # BLD AUTO: 5.1 K/UL (ref 1.8–7.7)
NEUTROPHILS NFR BLD: 50.9 % (ref 38–73)
NRBC BLD-RTO: 0 /100 WBC
OVALOCYTES BLD QL SMEAR: ABNORMAL
PHOSPHATE SERPL-MCNC: 2.2 MG/DL (ref 2.7–4.5)
PLATELET # BLD AUTO: 171 K/UL (ref 150–450)
PMV BLD AUTO: 11 FL (ref 9.2–12.9)
POIKILOCYTOSIS BLD QL SMEAR: SLIGHT
POLYCHROMASIA BLD QL SMEAR: ABNORMAL
POTASSIUM SERPL-SCNC: 3.9 MMOL/L (ref 3.5–5.1)
PROT SERPL-MCNC: 6.2 G/DL (ref 6–8.4)
RBC # BLD AUTO: 3.27 M/UL (ref 4–5.4)
SCHISTOCYTES BLD QL SMEAR: ABNORMAL
SODIUM SERPL-SCNC: 138 MMOL/L (ref 136–145)
WBC # BLD AUTO: 10.09 K/UL (ref 3.9–12.7)

## 2022-02-08 PROCEDURE — 97110 THERAPEUTIC EXERCISES: CPT

## 2022-02-08 PROCEDURE — 36415 COLL VENOUS BLD VENIPUNCTURE: CPT | Performed by: EMERGENCY MEDICINE

## 2022-02-08 PROCEDURE — 25000003 PHARM REV CODE 250: Performed by: EMERGENCY MEDICINE

## 2022-02-08 PROCEDURE — 25000003 PHARM REV CODE 250: Performed by: HOSPITALIST

## 2022-02-08 PROCEDURE — 97530 THERAPEUTIC ACTIVITIES: CPT

## 2022-02-08 PROCEDURE — 63600175 PHARM REV CODE 636 W HCPCS: Performed by: STUDENT IN AN ORGANIZED HEALTH CARE EDUCATION/TRAINING PROGRAM

## 2022-02-08 PROCEDURE — 83735 ASSAY OF MAGNESIUM: CPT | Performed by: EMERGENCY MEDICINE

## 2022-02-08 PROCEDURE — 25000003 PHARM REV CODE 250: Performed by: STUDENT IN AN ORGANIZED HEALTH CARE EDUCATION/TRAINING PROGRAM

## 2022-02-08 PROCEDURE — 25000242 PHARM REV CODE 250 ALT 637 W/ HCPCS: Performed by: STUDENT IN AN ORGANIZED HEALTH CARE EDUCATION/TRAINING PROGRAM

## 2022-02-08 PROCEDURE — 99239 PR HOSPITAL DISCHARGE DAY,>30 MIN: ICD-10-PCS | Mod: CR,,, | Performed by: STUDENT IN AN ORGANIZED HEALTH CARE EDUCATION/TRAINING PROGRAM

## 2022-02-08 PROCEDURE — 97112 NEUROMUSCULAR REEDUCATION: CPT

## 2022-02-08 PROCEDURE — 84100 ASSAY OF PHOSPHORUS: CPT | Performed by: EMERGENCY MEDICINE

## 2022-02-08 PROCEDURE — 85025 COMPLETE CBC W/AUTO DIFF WBC: CPT | Performed by: STUDENT IN AN ORGANIZED HEALTH CARE EDUCATION/TRAINING PROGRAM

## 2022-02-08 PROCEDURE — 80053 COMPREHEN METABOLIC PANEL: CPT | Performed by: EMERGENCY MEDICINE

## 2022-02-08 PROCEDURE — 99239 HOSP IP/OBS DSCHRG MGMT >30: CPT | Mod: CR,,, | Performed by: STUDENT IN AN ORGANIZED HEALTH CARE EDUCATION/TRAINING PROGRAM

## 2022-02-08 RX ORDER — FUROSEMIDE 20 MG/1
20 TABLET ORAL DAILY
Status: DISCONTINUED | OUTPATIENT
Start: 2022-02-09 | End: 2022-02-08 | Stop reason: HOSPADM

## 2022-02-08 RX ADMIN — PREDNISONE 5 MG: 5 TABLET ORAL at 09:02

## 2022-02-08 RX ADMIN — GUAIFENESIN AND DEXTROMETHORPHAN HYDROBROMIDE 1 TABLET: 600; 30 TABLET, EXTENDED RELEASE ORAL at 09:02

## 2022-02-08 RX ADMIN — IPRATROPIUM BROMIDE AND ALBUTEROL SULFATE 3 ML: 2.5; .5 SOLUTION RESPIRATORY (INHALATION) at 02:02

## 2022-02-08 RX ADMIN — PANTOPRAZOLE SODIUM 40 MG: 40 TABLET, DELAYED RELEASE ORAL at 09:02

## 2022-02-08 RX ADMIN — APIXABAN 5 MG: 5 TABLET, FILM COATED ORAL at 09:02

## 2022-02-08 RX ADMIN — ACETAMINOPHEN 650 MG: 325 TABLET ORAL at 04:02

## 2022-02-08 RX ADMIN — IPRATROPIUM BROMIDE AND ALBUTEROL SULFATE 3 ML: 2.5; .5 SOLUTION RESPIRATORY (INHALATION) at 08:02

## 2022-02-08 NOTE — PLAN OF CARE
Goals reviewed and remain appropriate. Pt progressing towards goals.    2022    Problem: Physical Therapy Goal  Goal: Physical Therapy Goal  Description: Goals to be met by: 22    Patient will increase functional independence with mobility by performin. Supine to sit with MInimal Assistance -met   1a. Supine to sit with SBA  2. Sit to stand transfer with Minimal Assistance with RW - not met  3. Bed to chair transfer with Minimal Assistance using Rolling Walker -not met  4. Gait  x 20 feet with Moderate Assistance using Rolling Walker. -not met  5. Ascend/descend 5 stair with right Handrails Moderate Assistance -not met  6. Sitting at edge of bed x10 minutes with Contact Guard Assistance to perform activities and increase endurance - not met    Outcome: Ongoing, Progressing

## 2022-02-08 NOTE — PLAN OF CARE
Pt will admit to [place] today.  Pt's daughter, Susu [phone], notified of discharge and time.      EBONIE scheduled d/c transportation to [place] through [facility]. Patient is scheduled to be picked up at 5:00p.m.. EBONIE provided patient's nurse with report number [phone], please call report at 4:55p.m.; ask for the nurse for room 224.  EBONIE is in communication with patient's CM and patient's Care Team.  Per pt's nurse, pt can discharge via wheel chair van/ room air.       02/08/22 1542   Post-Acute Status   Post-Acute Authorization Placement   Post-Acute Placement Status Set-up Complete/Auth obtained   Discharge Delays None known at this time     Cailin Subramanian LMSW  PRN-  [facility]  Ext. [phone]

## 2022-02-08 NOTE — PLAN OF CARE
Went over POC with patient.  Questions asked and answered.  Stated understanding of POC.  Pt AAO x 4.  Pt's 10 day course of ampicillin completed.  No complaints of pain throughout shift.  Pt's oxygen saturation remained 90% or greater on RA.  Weight shifted assistance given throughout night.  Will continue to monitor.

## 2022-02-08 NOTE — PLAN OF CARE
HPI:    Patient ID: Mare King is a 79year old male. Patient presents for preoperative clearance in preparation for laparotomy for newly diagnosed colon cancer tentatively scheduled for July 2.   Patient recently diagnosed with colon cancer after comp SW spoke to Triny clayton/ Eric/EPHRAIM [phone] in reference to placement.  Triny will meet with pt today and provide feedback to SW on if they are able to admit pt today.      EBONIE spoke to pt's daughter, Susu [phone], and provided the above information.  EBONIE will continue to follow.    Cailin Subramanian LMSW  PRN-  [facility]  Ext. [phone]     headaches. Psychiatric/Behavioral: Negative for agitation and confusion. The patient is not nervous/anxious. Current Outpatient Medications:  furosemide 40 MG Oral Tab Take 1 tablet (40 mg total) by mouth daily.  Disp: 90 tablet Rfl: 3   lis normal.   Psychiatric: He has a normal mood and affect. Thought content normal.     EKG--reveals normal sinus rhythm without any acute changes.          ASSESSMENT/PLAN:   Preop examination  (primary encounter diagnosis)  Malignant neoplasm of colon, unspec

## 2022-02-08 NOTE — PT/OT/SLP PROGRESS
Physical Therapy Co-Treatment    Patient Name:  Selma Lux   MRN:  [id number]    Recommendations:     Discharge Recommendations:  rehabilitation facility   Discharge Equipment Recommendations: wheelchair,bedside commode   Barriers to discharge: Inaccessible home and Decreased caregiver support    Assessment:     Selma Lux is a 86 y.o. female admitted with a medical diagnosis of Acute hypoxemic respiratory failure.  She presents with the following impairments/functional limitations:  weakness,impaired endurance,impaired self care skills,impaired functional mobilty,gait instability,impaired balance,impaired cardiopulmonary response to activity,decreased lower extremity function,decreased upper extremity function,decreased coordination,decreased safety awareness,impaired cognition. Pt continues to require assist to complete functional mobility. Pt demo'ing increased WOB throughout mobility with increased RR rate noted; difficulty obtaining accurate spO2 reading but appeared WFL when consistent waveform present. Unable to progress mobility further due to weakness, impaired standing balance, impaired endurance, and WOB.  Pt would continue to benefit from skilled acute PT in order to address current deficits and progress functional mobility.     Rehab Prognosis: Good; patient would benefit from acute skilled PT services to address these deficits and reach maximum level of function.     Recent Surgery: * No surgery found *      Plan:     During this hospitalization, patient to be seen 4 x/week to address the identified rehab impairments via gait training,therapeutic activities,therapeutic exercises,neuromuscular re-education and progress toward the following goals:    · Plan of Care Expires:  02/22/22    Subjective     Chief Complaint: Pt requesting earlier therapy session.  Patient/Family Comments/goals: Pt motivated to transfer to chair.   Pain/Comfort:  Pain Rating 1: 0/10      Objective:      Communicated with RN prior to session.  Patient found supine with telemetry,pulse ox (continuous),PureWick upon PT entry to room.     General Precautions: Standard, fall,airborne,contact,droplet   Orthopedic Precautions:N/A   Braces: N/A  Respiratory Status: Room air    SpO2 % throughout session when consistent waveform present. SpO2 decreased to low 80s but with poor waveform noted. Notable WOB with increased RR with min exertion. Max cues for pursed lip breathing throughout session. Required frequent rest breaks for recovery and to allow RR to decrease prior to continuing therapeutic activities.      Functional Mobility:  · Bed Mobility:     · Scooting: maximal assistance and of 2 persons, supine with drawsheet to HOB   · Supine to Sit: min assistance with HOB elevated and using bed rail (managing trunk)  · Sit to Supine: minimum assistance (managing LE)  · Pt began returning herself to supine  · Transfers:     · Sit to Stand: mod assistance and of 2 persons from EOB with hand-held assist x2 reps  · Cues provided for hand placement, LE positioning, ant weight-shift, and transfer technique   · BLE blocked throughout  · Balance:   · Sitting EOB: CGA-Tiff  · Inconsistent postural control noted with intermittent multi-directional LOB  · Cues provided for midline positioning and balance corrections   · static standing: modx2 with B HHA, x2 reps  · BLE blocked with intermittent knee buckling noted; increased trunk flex throughout; max cues for upright posture, increased ext, and ant weight-shift    AM-PAC 6 CLICK MOBILITY  Turning over in bed (including adjusting bedclothes, sheets and blankets)?: 3  Sitting down on and standing up from a chair with arms (e.g., wheelchair, bedside commode, etc.): 3  Moving from lying on back to sitting on the side of the bed?: 2  Moving to and from a bed to a chair (including a wheelchair)?: 2  Need to walk in hospital room?: 1  Climbing 3-5 steps with a railing?: 1  Basic  Mobility Total Score: 12       Therapeutic Activities and Exercises:  Pt educated on role of PT and PT POC. Pt verbalized understanding.   Daily orientation provided.   Pt educated on the effects of bed rest and the importance of EOB/OOB activity. Pt verbalized understanding.  Pt oriented to call bell and instructed to call for staff assist as needed. Pt verbalized understanding.   Set-up assist provided for pt to eat lunch at end of session     Patient left supine with HOB elevated with all lines intact, call button in reach, bed alarm on and PCT present..    GOALS:   Multidisciplinary Problems     Physical Therapy Goals        Problem: Physical Therapy Goal    Goal Priority Disciplines Outcome Goal Variances Interventions   Physical Therapy Goal     PT, PT/OT Ongoing, Progressing     Description: Goals to be met by: 22    Patient will increase functional independence with mobility by performin. Supine to sit with MInimal Assistance -met   1a. Supine to sit with SBA  2. Sit to stand transfer with Minimal Assistance with RW - not met  3. Bed to chair transfer with Minimal Assistance using Rolling Walker -not met  4. Gait  x 20 feet with Moderate Assistance using Rolling Walker. -not met  5. Ascend/descend 5 stair with right Handrails Moderate Assistance -not met  6. Sitting at edge of bed x10 minutes with Contact Guard Assistance to perform activities and increase endurance - not met                     Time Tracking:     PT Received On: 22  PT Start Time: 1152     PT Stop Time: 1219  PT Total Time (min): 27 min     Billable Minutes: Therapeutic Activity 12 and Neuromuscular Re-education 15   (co-treatment performed this date due to need for 2 skilled therapists in order to ensure pt safety, accomodate for pt activity tolerance, and maximize functional potential)     Treatment Type: Treatment  PT/PTA: PT     PTA Visit Number: 0     2022

## 2022-02-08 NOTE — DISCHARGE SUMMARY
[facility] - Telemetry [facility] Medicine  Discharge Summary      Patient Name: Selma Lux  MRN: [id number]  Patient Class: IP- Inpatient  Admission Date: 1/23/2022  Hospital Length of Stay: 16 days  Discharge Date and Time:  02/08/2022 4:29 PM  Attending Physician: Kizzy Sandoval MD   Discharging Provider: Kizzy Sandoval MD  Primary Care Provider: Bhargav Hirsch MD  [facility] Medicine Team: [facility] Kizzy Sandoval MD    HPI:   Selma Lux is a 84 y.o. 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  She has had encephalopathy during her hospital stay and admission to rehab. She tested positive for COVID yesterday.  Paramedics state that she has been having very poor oral intake.      Collateral information from her daughter.  She notes that she has had difficulty breathing for several months.  She states she has cryptogenic pneumonia and received steroids and breathing treatments for this. 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  She noted that she had been on the ventilator and really hated being on the ventilator.  However, she would want to be intubated if her life depended on it.  She would want to try everything possible to avoid intubation.  She noted that she tested positive for COVID yesterday along with her mother.  She states that she is not able to come see her mother due to coronavirus.  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     ED course: Patient with positive  COVID test and respiratory distress. This seems to be a recurrent problem with her history of cryptogenic organizing pneumonia. Prior chest x-rays seems to have had a similar appearance to the x-ray from today.  Diffuse infiltrates.  This resolved on recent chest x-rays from her admission.  Of note this recent admission is under a different registration under the same name in epic.   Her venous blood gas does not show respiratory failure or CO2 retention. She seems to have encephalopathy but this has been an ongoing issue. Her sodium today is normalized. Patient has markedly infected urine and an elevated white count.  ED started broad-spectrum IV antibiotics.      * No surgery found *      Hospital Course:   Selma Lux is a 84 y.o. 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  She has had encephalopathy during her hospital stay and admission to rehab. She tested positive for COVID on 1/23.  Paramedics state that she has been having very poor oral intake. Patient was admitted to ICU for hypotension and respiratory distress, anticipating rapid deterioration 2/2 her medical history. Patient is on 4L NC, SpO2 stable, she had episode of hypotensions but not sustained, no pressor indicated. Mentation improved in the morning. Patient required sedation for procedure. Family visit seemed to calm her down. Patient no longer required Precedex gtt. Qtc prolongation resolved, likely 2/2 to chronic plaquenil. Patient sat well on room air. Patient ready to transfer to lower level of care unit. ID consulted. Vanc and rocephin Dc'howard 01/28. Transitioned to amp-  completing 14d course. Found to have thrush w/up-trending WBC. Started on fluconazole 01/30. Improvement in leukocytosis.  T bili noted to be rising; likely due to fluconazole.  Due to improvement of thrush on physical exam, fluconazole stopped after 7 day course of treatment versus 14. T bili with subsequent improvement.  Deemed stable for discharge to rehab 01/31.  Issues due to COVID positive status and accepting facilities.  Patient discharged to [facility] on 02/08.  Discharge plan discussed with daughter who is in agreement.     Vitals:    02/08/22 1525   BP: (!) 123/58   Pulse: (!) 113   Resp: 20   Temp: 98.1 °F (36.7 °C)     Physical Exam  Gen: in NAD,  appears comfortable  HEENT:  EOMI, PERRL, MMM- resolution of white plaques w/in oropharynx   Extrem: pulses full, equal, and regular over all 4 extremities; edema of RUE-improved, no edema of other ext    Goals of Care Treatment Preferences:  Code Status: Full Code    Living Will: Yes              Consults:   Consults (From admission, onward)        Status Ordering Provider     Inpatient virtual consult to Hospital Medicine  Once        Provider:  (Not yet assigned)    Completed STERLING CASTRO     Inpatient virtual consult to Hospital Medicine  Once        Provider:  (Not yet assigned)    Completed KELSEY HORTON     Inpatient virtual consult to Hospital Medicine  Once        Provider:  (Not yet assigned)    Completed KELSEY HORTON     Inpatient consult to Infectious Diseases  Once        Provider:  (Not yet assigned)    Completed KELSEY HORTON     Inpatient consult to Physical Medicine Rehab  Once        Provider:  (Not yet assigned)    Completed KELSEY HORTON     Inpatient consult to Midline team  Once        Provider:  (Not yet assigned)    Completed HENRI PEÑA     Inpatient consult to Critical Care Medicine  Once        Provider:  (Not yet assigned)    Completed HENRI RAMIREZ          No new Assessment & Plan notes have been  filed under this hospital service since the last note was generated.  Service: Hospital Medicine    Final Active Diagnoses:    Diagnosis Date Noted POA    PRINCIPAL PROBLEM:  Acute hypoxemic respiratory failure [J96.01] 04/11/2018 Yes    Serum total bilirubin elevated [R17] 02/05/2022 Yes    Moderate malnutrition [E44.0] 02/04/2022 Yes    Thrush [B37.0] 01/30/2022 Yes    Enterococcal bacteremia [R78.81, B95.2]  No    COVID-19 virus infection [U07.1] 01/23/2022 Yes    COPD exacerbation [J44.1] 01/23/2022 Yes    Immunocompromised state due to drug therapy [D84.821, Z79.899] 01/23/2022 Not Applicable    Chronic systolic (congestive) heart failure [I50.22] 08/06/2021 Yes    Leukocytosis [D72.829]  Yes    Multinodular goiter [E04.2] 01/24/2019 Yes    UTI (urinary tract infection) [N39.0] 12/29/2018 Yes    AF (paroxysmal atrial fibrillation) [I48.0] 06/29/2018 Yes    Oropharyngeal dysphagia [R13.12] 04/26/2018 Yes    Chronic renal failure syndrome, stage 3 (moderate) [N18.30] 04/15/2018 Yes    Long QT interval [R94.31] 06/29/2016 Yes    Hypertension, essential [I10] 06/16/2016 Yes    PUD (peptic ulcer disease) [K27.9] 03/11/2014 Yes      Problems Resolved During this Admission:       Discharged Condition: stable    Disposition: Rehab Facility    Follow Up:    Patient Instructions:      Ambulatory referral/consult to Outpatient Case Management   Referral Priority: Routine Referral Type: Consultation   Referral Reason: Specialty Services Required   Number of Visits Requested: 1       Significant Diagnostic Studies: Labs:   CMP   Recent Labs   Lab 02/07/22  0530 02/08/22  0247    138   K 3.8 3.9    105   CO2 22* 20*   GLU 62* 55*   BUN 11 9   CREATININE 0.6 0.6   CALCIUM 8.0* 8.2*   PROT 5.7* 6.2   ALBUMIN 2.1* 2.3*   BILITOT 1.3* 1.1*   ALKPHOS 88 75   AST 17 22   ALT 16 17   ANIONGAP 10 13   ESTGFRAFRICA >60.0 >60.0   EGFRNONAA >60.0 >60.0    and CBC   Recent Labs   Lab 02/08/22  0247   WBC  10.09   HGB 9.3*   HCT 32.1*        Microbiology:   Blood Culture   Lab Results   Component Value Date    LABBLOO No growth after 5 days. 01/29/2022    and Urine Culture    Lab Results   Component Value Date    LABURIN ESCHERICHIA COLI  >100,000 cfu/ml   (A) 01/23/2022       Pending Diagnostic Studies:     Procedure Component Value Units Date/Time    Comprehensive metabolic panel [[id number]]     Order Status: Sent Lab Status: No result     Specimen: Blood     Magnesium [[id number]]     Order Status: Sent Lab Status: No result     Specimen: Blood     Phosphorus [[id number]]     Order Status: Sent Lab Status: No result     Specimen: Blood          Medications:  Reconciled Home Medications:      Medication List      START taking these medications    dextromethorphan-guaiFENesin  mg  mg per 12 hr tablet  Commonly known as: MUCINEX DM  Take 1 tablet by mouth 2 (two) times daily. for 10 days        CHANGE how you take these medications    furosemide 40 MG tablet  Commonly known as: LASIX  Take 1 tablet (40 mg total) by mouth every other day. TAKE 1 TABLET(40 MG) BY MOUTH DAILY  What changed: when to take this     gabapentin 300 MG capsule  Commonly known as: NEURONTIN  Take 1 capsule (300 mg total) by mouth every evening.  What changed: Another medication with the same name was removed. Continue taking this medication, and follow the directions you see here.     hydrOXYchloroQUINE 200 mg tablet  Commonly known as: PLAQUENIL  Take 1 tablet (200 mg total) by mouth 2 (two) times daily.  Start taking on: February 12, 2022  What changed: These instructions start on February 12, 2022. If you are unsure what to do until then, ask your doctor or other care provider.     mycophenolate 500 mg Tab  Commonly known as: CELLCEPT  Take 1 tablet (500 mg total) by mouth 2 (two) times daily. Resume 02/12/22- 20d following covid infection  Start taking on: February 12, 2022  What changed:   · additional  instructions  · These instructions start on February 12, 2022. If you are unsure what to do until then, ask your doctor or other care provider.        CONTINUE taking these medications    albuterol 90 mcg/actuation inhaler  Commonly known as: PROVENTIL/VENTOLIN HFA  Inhale 2 puffs into the lungs every 6 (six) hours as needed for Wheezing. Rescue     apixaban 5 mg Tab  Commonly known as: ELIQUIS  Take 1 tablet (5 mg total) by mouth 2 (two) times daily.     atorvastatin 40 MG tablet  Commonly known as: LIPITOR  Take 1 tablet (40 mg total) by mouth once daily.     baclofen 10 MG tablet  Commonly known as: LIORESAL  Take 0.5-1 tablets (5-10 mg total) by mouth 3 (three) times daily as needed.     magnesium oxide 400 mg (241.3 mg magnesium) tablet  Commonly known as: MAG-OX  Take 400 mg by mouth once daily.     melatonin 3 mg tablet  Commonly known as: MELATIN  Take 2 tablets (6 mg total) by mouth nightly as needed for Insomnia.     montelukast 10 mg tablet  Commonly known as: SINGULAIR  Take 1 tablet (10 mg total) by mouth every evening.     omeprazole 20 MG capsule  Commonly known as: PRILOSEC  Take 1 capsule (20 mg total) by mouth once daily.     polyethylene glycol 17 gram Pwpk  Commonly known as: GLYCOLAX  Take 17 g by mouth daily as needed.     predniSONE 5 MG tablet  Commonly known as: DELTASONE  Take 1 tablet (5 mg total) by mouth once daily.     sumatriptan 50 MG tablet  Commonly known as: IMITREX  Take 1 tablet (50 mg total) by mouth every 6 (six) hours as needed for Migraine.     thiamine 100 MG tablet  Take 100 mg by mouth once daily.     traMADoL 50 mg tablet  Commonly known as: ULTRAM  Take 1 tablet (50 mg total) by mouth every 6 (six) hours as needed for Pain.        STOP taking these medications    amLODIPine 10 MG tablet  Commonly known as: NORVASC     ciclopirox 8 % Soln  Commonly known as: PENLAC     LIDOcaine 5 %  Commonly known as: LIDODERM     phenazopyridine 100 MG tablet  Commonly known as:  PYRIDIUM     sulfamethoxazole-trimethoprim 800-160mg 800-160 mg Tab  Commonly known as: BACTRIM DS            Indwelling Lines/Drains at time of discharge:   Lines/Drains/Airways     None                 Time spent on the discharge of patient: 35 minutes         Kizzy Sandoval MD  Department of Hospital Medicine  [facility] - Telemetry Stepdown

## 2022-02-08 NOTE — PT/OT/SLP PROGRESS
"Occupational Therapy   Treatment    Name: Selma Lux  MRN: [id number]  Admitting Diagnosis:  Acute hypoxemic respiratory failure       Recommendations:     Discharge Recommendations: rehabilitation facility  Discharge Equipment Recommendations:   (TBD pending progress)  Barriers to discharge:  Inaccessible home environment,Decreased caregiver support    Assessment:     Selma Lux is a 86 y.o. female with a medical diagnosis of Acute hypoxemic respiratory failure.  She presents with the following performance deficits affecting function are weakness,impaired endurance,impaired self care skills,impaired functional mobilty,gait instability,impaired balance,decreased upper extremity function,decreased lower extremity function,impaired cardiopulmonary response to activity. Patient participated well with therapy, demonstrating some improvement with level of assist required to reach edge of bed, inconsistent report of shortness of breath however SpO2 measuring >88% through out session when accurate waveform present, RR 54 following initial sit <> stand; patient will continue to benefit from therapy during this admission and placement in rehabilitation facility to maximize functional independence.    Rehab Prognosis:  Good; patient would benefit from acute skilled OT services to address these deficits and reach maximum level of function.       Plan:     Patient to be seen 3 x/week to address the above listed problems via self-care/home management,therapeutic activities,therapeutic exercises,neuromuscular re-education  · Plan of Care Expires: 02/22/22  · Plan of Care Reviewed with: patient    Subjective   "I don't know, I think I sound like this sometimes."    Pain/Comfort:  · Pain Rating 1: 0/10  · Pain Addressed 1: Distraction  · Pain Rating Post-Intervention 1: 0/10    Objective:   Communicated with: Nursing prior to session.  Patient found supine with peripheral IV,PureWick,pulse ox (continuous),telemetry " upon OT entry to room.    General Precautions: Standard, airborne,contact,droplet,fall   Orthopedic Precautions:N/A   Braces: N/A  Respiratory Status: Room air     Occupational Performance:     Bed Mobility:    · Patient completed Rolling/Turning to Right with minimum assistance  · Patient completed Scooting/Bridging with minimum assistance to scoot to edge of bed and place B feet on the floor  · Patient completed Supine to Sit with minimum assistance  · Patient completed Sit to Supine with minimum assistance, patient requiring assist for BLE's upon returning self to supine and for adjustment to midline prior to adjusting head of bed upon arrival of lunch     Functional Mobility/Transfers:  · Patient completed Sit <> Stand Transfer with moderate assistance and of 2 persons  with  hand-held assist   · X 2 trials from edge of bed  · Mod A x 2 in stance, required B knees blocked due to buckling    Activities of Daily Living:  · Upper Body Dressing: Minimal assist to mange back of gown seated edge of bed  · Lower Body Dressing: Maximal assist for B socks as patient with varying and non-purposeful sway with static sitting and unable to safely attempt    AMPAC 6 Click ADL: 16    Treatment & Education:  Sitting balance - min A to CGA x ~15 mins to complete therex/postural adjustments  Pt completed 1 x 10 scapular elevation/retraction + shoulder flexion with cues for breathing techniques, upright posture and AAROM to reach full range of motion  Pt ed on roles/goals of OT and POC  White board updated    Patient left HOB elevated with all lines intact and call button in reachEducation:      GOALS:   Multidisciplinary Problems     Occupational Therapy Goals        Problem: Occupational Therapy Goal    Goal Priority Disciplines Outcome Interventions   Occupational Therapy Goal     OT, PT/OT Ongoing, Progressing    Description: Goals to be met by: 2/7/2022    Patient will increase functional independence with ADLs by  performing:    UE Dressing with Minimal Assistance.  Grooming while EOB with Moderate Assistance.  Toileting from bedside commode with Moderate Assistance for hygiene and clothing management.   Sitting at edge of bed x10 minutes with Moderate Assistance.  Toilet transfer to bedside commode with Moderate Assistance.                     Time Tracking:     OT Date of Treatment: 02/08/22  OT Start Time: 1154  OT Stop Time: 1219  OT Total Time (min): 25 min    Billable Minutes:Therapeutic Activity 15  Therapeutic Exercise 10    OT/GENE: OT          2/8/2022

## 2022-02-08 NOTE — NURSING
Attempted to call report charge nurse Alena and stated that she did not know anything about the patient. Took my info and stated would call me back.

## 2022-02-10 NOTE — PHYSICIAN QUERY
PT Name: Selma Lux  MR #: [id number]     DOCUMENTATION CLARIFICATION     CDS/: Fiona Spangler RN, CDI            Contact information:[email or web address]  This form is a permanent document in the medical record.     Query Date: February 10, 2022    By submitting this query, we are merely seeking further clarification of documentation.  Please utilize your independent clinical judgment when addressing the question(s) below.  The Medical Record contains the following:  Indicators Supporting Clinical Findings Location in Medical Record   x HR         RR          BP        Temp Temp: 99.32 °F   Pulse: (!) 112   Resp: (!) 24   BP: 116/63   SpO2: 95 %  CC PN 1/23    Lactic Acid          Procalcitonin     x WBC           Bands          CRP  14.49 Lab 1/23   x Culture(s) ESCHERICHIA COLI   >100,000 cfu/ml     ENTEROCOCCUS FAECALIS Abnormal      COAGULASE-NEGATIVE STAPHYLOCOCCUS SPECIES   Organism is a probable contaminant     No growth after 5 days.  Urine cx 1/23      Blood cx 1/23    Blood cx 1/24        Blood cx 1/29   x AMS, Confusion, LOC, etc. Multifactorial Encephalopathy:  Likely combination of hyperactive delirium and septic encephalopathy.  Now on Precedex drip.  . Will supplement with antipsychotics    CC PN 1/24    Organ Dysfunction/Failure     x Bacteremia or Sepsis / Septic 86-year-old female with numerous medical problems including pre-existing lung disease and RA on Plaquenil and CellCept here with sepsis and COVID     Sepsis:  UCx  with GNR.  On vanc and Zosyn.  Bld Cx with strep.  Repeat pending    CC PN 1/24          Known or Suspected Source of Infection documented      (Failed) Outpatient Treatment      Medication     x Treatment Currently on vancomycin and ceftriaxone for E faecalis bacteremia and E coli UTI. Can transition to ampicillin to target both species. ID consult 1/28   x Other Enterococcal bacteremia  ... admitted on 1/23/2022 from rehab facility with 1 week of SOB;  found to have acute hypoxic respiratory failure due to COVID-19, encephalopathy, E faecalis bacteremia ( 1/4 rigoberto bottle on admit BCx; likely GI/ source causing transient bacteremia), and symptomatic E coli UTI.  ID consult 1/28        Provider, please clarify the sepsis diagnosis with the  associated with above clinical findings.  [   ] Sepsis 2/2 Covid PNA   [   ] Sepsis 2/2 E coli   [  x ] Sepsis 2/2 enterococcus   [   ] Sepsis, unknown organism   [   ] Sepsis ruled out; bacteremia only   [   ] Other Infectious Disease (please specify): __________   [  ] Clinically Undetermined       Please document in your progress notes daily for the duration of treatment until resolved and include in your discharge summary.

## 2022-02-17 ENCOUNTER — PES CALL (OUTPATIENT)
Dept: ADMINISTRATIVE | Facility: CLINIC | Age: 87
End: 2022-02-17
Payer: MEDICARE

## 2022-02-23 DIAGNOSIS — D84.9 IMMUNOSUPPRESSED STATUS: ICD-10-CM

## 2022-02-28 ENCOUNTER — EXTERNAL CHRONIC CARE MANAGEMENT (OUTPATIENT)
Dept: PRIMARY CARE CLINIC | Facility: CLINIC | Age: 87
End: 2022-02-28
Payer: MEDICARE

## 2022-02-28 PROCEDURE — 99490 CHRNC CARE MGMT STAFF 1ST 20: CPT | Mod: PBBFAC | Performed by: FAMILY MEDICINE

## 2022-02-28 PROCEDURE — 99490 CHRNC CARE MGMT STAFF 1ST 20: CPT | Mod: S$PBB,,, | Performed by: FAMILY MEDICINE

## 2022-02-28 PROCEDURE — 99490 PR CHRONIC CARE MGMT, 1ST 20 MIN: ICD-10-PCS | Mod: S$PBB,,, | Performed by: FAMILY MEDICINE

## 2022-03-05 ENCOUNTER — HOSPITAL ENCOUNTER (EMERGENCY)
Facility: HOSPITAL | Age: 87
Discharge: HOME | End: 2022-03-05
Attending: EMERGENCY MEDICINE
Payer: MEDICARE

## 2022-03-05 VITALS
HEIGHT: 64 IN | HEART RATE: 106 BPM | SYSTOLIC BLOOD PRESSURE: 146 MMHG | TEMPERATURE: 98 F | RESPIRATION RATE: 20 BRPM | BODY MASS INDEX: 25.78 KG/M2 | OXYGEN SATURATION: 97 % | DIASTOLIC BLOOD PRESSURE: 97 MMHG | WEIGHT: 151 LBS

## 2022-03-05 DIAGNOSIS — R41.82 AMS (ALTERED MENTAL STATUS): Primary | ICD-10-CM

## 2022-03-05 DIAGNOSIS — N30.00 ACUTE CYSTITIS WITHOUT HEMATURIA: ICD-10-CM

## 2022-03-05 LAB
ALBUMIN SERPL BCP-MCNC: 2.6 G/DL (ref 3.5–5.2)
ALP SERPL-CCNC: 78 U/L (ref 55–135)
ALT SERPL W/O P-5'-P-CCNC: 9 U/L (ref 10–44)
ANION GAP SERPL CALC-SCNC: 12 MMOL/L (ref 8–16)
ANISOCYTOSIS BLD QL SMEAR: SLIGHT
AST SERPL-CCNC: 15 U/L (ref 10–40)
BACTERIA #/AREA URNS AUTO: ABNORMAL /HPF
BASOPHILS # BLD AUTO: 0.04 K/UL (ref 0–0.2)
BASOPHILS NFR BLD: 0.3 % (ref 0–1.9)
BILIRUB SERPL-MCNC: 0.8 MG/DL (ref 0.1–1)
BILIRUB UR QL STRIP: NEGATIVE
BUN SERPL-MCNC: 10 MG/DL (ref 8–23)
BURR CELLS BLD QL SMEAR: ABNORMAL
CALCIUM SERPL-MCNC: 8.9 MG/DL (ref 8.7–10.5)
CHLORIDE SERPL-SCNC: 106 MMOL/L (ref 95–110)
CLARITY UR REFRACT.AUTO: ABNORMAL
CO2 SERPL-SCNC: 23 MMOL/L (ref 23–29)
COLOR UR AUTO: YELLOW
CREAT SERPL-MCNC: 0.7 MG/DL (ref 0.5–1.4)
DIFFERENTIAL METHOD: ABNORMAL
EOSINOPHIL # BLD AUTO: 0.3 K/UL (ref 0–0.5)
EOSINOPHIL NFR BLD: 2.3 % (ref 0–8)
ERYTHROCYTE [DISTWIDTH] IN BLOOD BY AUTOMATED COUNT: 17.4 % (ref 11.5–14.5)
EST. GFR  (AFRICAN AMERICAN): >60 ML/MIN/1.73 M^2
EST. GFR  (NON AFRICAN AMERICAN): >60 ML/MIN/1.73 M^2
GLUCOSE SERPL-MCNC: 77 MG/DL (ref 70–110)
GLUCOSE UR QL STRIP: NEGATIVE
HCT VFR BLD AUTO: 32 % (ref 37–48.5)
HGB BLD-MCNC: 9.9 G/DL (ref 12–16)
HGB UR QL STRIP: NEGATIVE
HYALINE CASTS UR QL AUTO: 0 /LPF
HYPOCHROMIA BLD QL SMEAR: ABNORMAL
IMM GRANULOCYTES # BLD AUTO: 0.09 K/UL (ref 0–0.04)
IMM GRANULOCYTES NFR BLD AUTO: 0.7 % (ref 0–0.5)
KETONES UR QL STRIP: NEGATIVE
LEUKOCYTE ESTERASE UR QL STRIP: ABNORMAL
LYMPHOCYTES # BLD AUTO: 4.3 K/UL (ref 1–4.8)
LYMPHOCYTES NFR BLD: 34.3 % (ref 18–48)
MCH RBC QN AUTO: 26.8 PG (ref 27–31)
MCHC RBC AUTO-ENTMCNC: 30.9 G/DL (ref 32–36)
MCV RBC AUTO: 87 FL (ref 82–98)
MICROSCOPIC COMMENT: ABNORMAL
MONOCYTES # BLD AUTO: 2.8 K/UL (ref 0.3–1)
MONOCYTES NFR BLD: 22.1 % (ref 4–15)
NEUTROPHILS # BLD AUTO: 5.1 K/UL (ref 1.8–7.7)
NEUTROPHILS NFR BLD: 40.3 % (ref 38–73)
NITRITE UR QL STRIP: NEGATIVE
NRBC BLD-RTO: 0 /100 WBC
OVALOCYTES BLD QL SMEAR: ABNORMAL
PH UR STRIP: 6 [PH] (ref 5–8)
PLATELET # BLD AUTO: 289 K/UL (ref 150–450)
PMV BLD AUTO: 10.1 FL (ref 9.2–12.9)
POIKILOCYTOSIS BLD QL SMEAR: SLIGHT
POLYCHROMASIA BLD QL SMEAR: ABNORMAL
POTASSIUM SERPL-SCNC: 3.4 MMOL/L (ref 3.5–5.1)
PROT SERPL-MCNC: 7.2 G/DL (ref 6–8.4)
PROT UR QL STRIP: ABNORMAL
RBC # BLD AUTO: 3.69 M/UL (ref 4–5.4)
RBC #/AREA URNS AUTO: 6 /HPF (ref 0–4)
SCHISTOCYTES BLD QL SMEAR: ABNORMAL
SODIUM SERPL-SCNC: 141 MMOL/L (ref 136–145)
SP GR UR STRIP: 1.01 (ref 1–1.03)
SQUAMOUS #/AREA URNS AUTO: 2 /HPF
URN SPEC COLLECT METH UR: ABNORMAL
WBC # BLD AUTO: 12.63 K/UL (ref 3.9–12.7)
WBC #/AREA URNS AUTO: >100 /HPF (ref 0–5)
WBC CLUMPS UR QL AUTO: ABNORMAL

## 2022-03-05 PROCEDURE — 63600175 PHARM REV CODE 636 W HCPCS: Performed by: EMERGENCY MEDICINE

## 2022-03-05 PROCEDURE — 87077 CULTURE AEROBIC IDENTIFY: CPT | Performed by: EMERGENCY MEDICINE

## 2022-03-05 PROCEDURE — 87186 SC STD MICRODIL/AGAR DIL: CPT | Performed by: EMERGENCY MEDICINE

## 2022-03-05 PROCEDURE — 93005 ELECTROCARDIOGRAM TRACING: CPT

## 2022-03-05 PROCEDURE — 87088 URINE BACTERIA CULTURE: CPT | Performed by: EMERGENCY MEDICINE

## 2022-03-05 PROCEDURE — 99285 PR EMERGENCY DEPT VISIT,LEVEL V: ICD-10-PCS | Mod: CR,,, | Performed by: EMERGENCY MEDICINE

## 2022-03-05 PROCEDURE — 80053 COMPREHEN METABOLIC PANEL: CPT | Performed by: EMERGENCY MEDICINE

## 2022-03-05 PROCEDURE — 81001 URINALYSIS AUTO W/SCOPE: CPT | Performed by: EMERGENCY MEDICINE

## 2022-03-05 PROCEDURE — 99285 EMERGENCY DEPT VISIT HI MDM: CPT | Mod: 25

## 2022-03-05 PROCEDURE — 93010 ELECTROCARDIOGRAM REPORT: CPT | Mod: ,,, | Performed by: INTERNAL MEDICINE

## 2022-03-05 PROCEDURE — 85025 COMPLETE CBC W/AUTO DIFF WBC: CPT | Performed by: EMERGENCY MEDICINE

## 2022-03-05 PROCEDURE — 93010 EKG 12-LEAD: ICD-10-PCS | Mod: ,,, | Performed by: INTERNAL MEDICINE

## 2022-03-05 PROCEDURE — 87086 URINE CULTURE/COLONY COUNT: CPT | Performed by: EMERGENCY MEDICINE

## 2022-03-05 PROCEDURE — 99285 EMERGENCY DEPT VISIT HI MDM: CPT | Mod: CR,,, | Performed by: EMERGENCY MEDICINE

## 2022-03-05 PROCEDURE — 96365 THER/PROPH/DIAG IV INF INIT: CPT

## 2022-03-05 RX ORDER — CEPHALEXIN 500 MG/1
500 CAPSULE ORAL 4 TIMES DAILY
Qty: 40 CAPSULE | Refills: 0 | Status: SHIPPED | OUTPATIENT
Start: 2022-03-05 | End: 2022-03-15

## 2022-03-05 RX ADMIN — CEFTRIAXONE 1 G: 1 INJECTION, SOLUTION INTRAVENOUS at 03:03

## 2022-03-05 NOTE — ED PROVIDER NOTES
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  Daughter reports pt had some visual hallucinations last night that resolved, then this morning was more argumentative and difficult than normal.  Wasn't compliant with having depends changed and then when daughter went back to check on her, she was unresponsive.  EMS was called - they initially thought pt with R sided weakness but then when transported into ambulance, became more interactive, moving all extremities equally.  Pt currently angry about being here, reluctant to answer questions.  Denies pain anywhere, feels she is ok, wants to go home.    The history is provided by the patient, a relative and the EMS personnel. The history is limited by the condition of the patient.     Review of patient's allergies indicates:  No Known Allergies  Past Medical History:   Diagnosis Date    Acute cystitis without hematuria 2/27/2020    Acute hypoxemic respiratory failure 4/11/2018    Acute respiratory failure with hypoxia 3/2/2020    Altered mental status     Anemia     Arthritis     Bilateral hand pain 3/14/2018    Branch retinal vein occlusion, left eye 2/20/2015    Chronic bilateral low back pain without  sciatica 3/23/2017    Chronic renal failure in pediatric patient, stage 3 (moderate) 4/15/2018    Cognitive communication deficit 12/19/2017    Cystoid macular edema, left eye 2/20/2015    Cystoid macular edema, left eye 2/20/2015    DJD (degenerative joint disease) of cervical spine 5/15/2013    Fatty liver 8/26/2014    Goiter 4/9/2018    Hashimoto's disease     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    Hypertension     Hypertensive encephalopathy     IBS (irritable bowel syndrome) 6/21/2017    IGT (impaired glucose tolerance) 1/12/2016    Iron deficiency anemia secondary to inadequate dietary iron intake 6/24/2013    Joint pain     Keratoconjunctivitis sicca of both eyes not specified as Sjogren's 7/29/2016    Leiomyoma of uterus, unspecified 9/16/2014    Long QT interval 6/29/2016    Due to medication (plaquenil)     Macular edema 1/12/2015    Multinodular goiter 1/12/2016    Neuropathy 8/23/2017    Plaquenil causing adverse effect in therapeutic use 10/7/2016    Pneumococcal vaccine refused 8/17/2016    Pneumonia due to Streptococcus pneumoniae 4/5/2018    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/2015    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    s/p Right Total knee replacement 5/15/2013    Scoliosis of thoracic spine 5/15/2013    Small vessel disease, cerebrovascular 12/28/2017    Stroke     UTI (urinary tract infection) 12/29/2018    Vascular dementia 12/6/2017     Past Surgical History:   Procedure Laterality Date    BREAST CYST EXCISION      CATARACT EXTRACTION      COLONOSCOPY N/A 9/29/2015    Procedure: COLONOSCOPY;  Surgeon: FIDELINA Sanchez MD;  [facility]  Service: Endoscopy;  Laterality: N/A;    EYE SURGERY      INJECTION OF ANESTHETIC AGENT AROUND NERVE Left 4/19/2021    Procedure: BLOCK, NERVE, FEMORAL AND OBTURATOR;  Surgeon: Shivam Gonzalez MD;  Location: [facility] PAIN MGT;  Service: Pain Management;  Laterality: Left;  consent needed    JOINT  REPLACEMENT      right knee    KNEE SURGERY Left 12/31/2013    TKR    left parotidectomy      mixed tumor    ND EVAL,SWALLOW FUNCTION,CINE/VIDEO RECORD  6/5/2021         SALIVARY GLAND SURGERY      TONSILLECTOMY      UPPER GASTROINTESTINAL ENDOSCOPY       Family History   Problem Relation Age of Onset    Hypertension Mother     Heart disease Mother     Hyperthyroidism Mother     Prostate cancer Father         prostate cancer    Cancer Father     Breast cancer Maternal Grandmother     Hyperthyroidism Other     Colon cancer Neg Hx      Social History     Tobacco Use    Smoking status: Never Smoker    Smokeless tobacco: Never Used   Substance Use Topics    Alcohol use: Yes     Alcohol/week: 0.0 standard drinks     Comment: very seldom     Drug use: No     Review of Systems   Unable to perform ROS: Other       Physical Exam     Initial Vitals [03/05/22 1046]   BP Pulse Resp Temp SpO2   (!) 146/78 98 15 97.6 °F (36.4 °C) 98 %      MAP       --         Physical Exam    Nursing note and vitals reviewed.  Constitutional: Vital signs are normal. She appears well-developed and well-nourished. She is not diaphoretic.  Non-toxic appearance. She does not appear ill. No distress.   HENT:   Head: Normocephalic and atraumatic.   Mouth/Throat: Oropharynx is clear and moist and mucous membranes are normal. Mucous membranes are not dry.   Eyes: Conjunctivae and lids are normal.   Neck: Neck supple.   Normal range of motion.  Cardiovascular: Normal rate and regular rhythm.   Pulmonary/Chest: No respiratory distress.   Abdominal: Abdomen is soft. She exhibits no distension. There is no abdominal tenderness. There is no rebound and no guarding.   Musculoskeletal:         General: No edema. Normal range of motion.      Cervical back: Normal range of motion and neck supple.     Neurological: She is alert. She has normal strength. No cranial nerve deficit or sensory deficit. She exhibits normal muscle tone. Coordination  normal. GCS score is 15. GCS eye subscore is 4. GCS verbal subscore is 5. GCS motor subscore is 6.   Knows she's at [facility], did not know date  Answers most questions appropriately, able to speak at length about her work post-[employer]    No pronator drift  Able to lift both legs off the bed  No dysarthria or aphasia   Skin: Skin is dry and intact. No pallor.   Psychiatric: Her speech is normal. Thought content normal. Her affect is angry.         ED Course   Procedures  Labs Reviewed   CULTURE, URINE - Abnormal; Notable for the following components:       Result Value    Urine Culture, Routine   (*)     Value: GRAM NEGATIVE CARLOS  >100,000 cfu/ml  Identification and susceptibility pending      All other components within normal limits    Narrative:     Specimen Source->Urine   CBC W/ AUTO DIFFERENTIAL - Abnormal; Notable for the following components:    RBC 3.69 (*)     Hemoglobin 9.9 (*)     Hematocrit 32.0 (*)     MCH 26.8 (*)     MCHC 30.9 (*)     RDW 17.4 (*)     Immature Granulocytes 0.7 (*)     Immature Grans (Abs) 0.09 (*)     Mono # 2.8 (*)     Mono % 22.1 (*)     All other components within normal limits   COMPREHENSIVE METABOLIC PANEL - Abnormal; Notable for the following components:    Potassium 3.4 (*)     Albumin 2.6 (*)     ALT 9 (*)     All other components within normal limits   URINALYSIS, REFLEX TO URINE CULTURE - Abnormal; Notable for the following components:    Appearance, UA Cloudy (*)     Protein, UA 1+ (*)     Leukocytes, UA 3+ (*)     All other components within normal limits    Narrative:     Specimen Source->Urine   URINALYSIS MICROSCOPIC - Abnormal; Notable for the following components:    RBC, UA 6 (*)     WBC, UA >100 (*)     WBC Clumps, UA Few (*)     All other components within normal limits    Narrative:     Specimen Source->Urine        ECG Results          EKG 12-lead (Final result)  Result time 03/06/22 09:25:26    Final result by Interface, Lab In [facility] (03/06/22 09:25:26)                  Narrative:    Test Reason :     Vent. Rate : 103 BPM     Atrial Rate : 103 BPM     P-R Int : 150 ms          QRS Dur : 082 ms      QT Int : 354 ms       P-R-T Axes : 024 046 -07 degrees     QTc Int : 463 ms    Sinus tachycardia with occasional Premature ventricular complexes  Low voltage QRS  Cannot rule out Anterior infarct (cited on or before 30-JAN-2022)  Abnormal ECG  When compared with ECG of 30-JAN-2022 14:32,  Premature ventricular complexes are now Present  Confirmed by Radha GARSIA, Farida ([id number]) on 3/6/2022 9:25:20 AM    Referred By: VINAY   SELF           Confirmed By:Farida Garcia MD                            Imaging Results          CT Head Without Contrast (Final result)  Result time 03/05/22 12:47:16    Final result by Carlos Bull MD (03/05/22 12:47:16)                 Impression:      No acute intracranial process.      Electronically signed by: Carlos Bull  Date:    03/05/2022  Time:    12:47             Narrative:    EXAMINATION:  CT HEAD WITHOUT CONTRAST    CLINICAL HISTORY:  Mental status change, unknown cause;    TECHNIQUE:  Low dose axial images were obtained through the head.  Coronal and sagittal reformations were also performed. Contrast was not administered.    COMPARISON:  01/23/2022 03/02/2020    FINDINGS:  Evaluation is limited by motion.    There is no evidence of hydrocephalus mass effect acute intracranial hemorrhage or acute territorial infarct.    A 3 mm in width low-density subdural collection along the right frontal lobe has decreased in size from 01/23/2022.    Decreased attenuation within the periventricular white matter is nonspecific but may reflect mild to moderate chronic small vessel ischemic change, stable.  Stable chronic small cerebellar infarcts are again noted bilaterally.    Prominent atherosclerotic vascular calcifications at the skull base.    No calvarial fracture.  Prominent chronic degenerative changes at the C1-2 articulation is again  noted compromising the spinal canal..    The visualized sinuses demonstrate mild scattered sinus fluid/mucosal thickening in the ethmoid air cells.                                 Medications   cefTRIAXone (ROCEPHIN) 1 g/50 mL D5W IVPB (0 g Intravenous Stopped 3/5/22 1604)     Medical Decision Making:   History:   Old Medical Records: I decided to obtain old medical records.  Initial Assessment:   On arrival, pt awake, alert, conversational, seems to be close to baseline though a bit more confused per daughter  Neuro exam nonfocal  VSS and clinically well-appearing  Differential Diagnosis:   Unclear if this was a syncope vs near-syncope episode, seizure, less likely TIA.    Clinical Tests:   Lab Tests: Ordered and Reviewed  Radiological Study: Ordered and Reviewed  Medical Tests: Reviewed and Ordered  ED Management:  Labs  CT head  EKG  Long d/w pt and daughter.  Pt very adamant about wanting to be d/c'd home, hates being here and daughter is understanding of this.  Plan for d/c home if neuro exam remains stable and no abnormalities found on ED work-up.  She lives with her 2 daughters and has home healthcare as well.  I suspect that the hazards of hospitalization would be high in this pt and that she would be very high risk for falls, delirium, deconditioning were she to be readmitted.             ED Course as of 03/06/22 0940   Sat Mar 05, 2022   1453 Appearance, UA(!): Cloudy [AS]   1453 Leukocytes, UA(!): 3+ [AS]   1453 RBC, UA(!): 6 [AS]   1453 WBC, UA(!): >100 [AS]   1453 WBC Clumps, UA(!): Few [AS]   1453 WBC: 12.63 [AS]   1453 + UTI on UA.  Labs o/w reassuring, mild leukocytosis.  Pt still anxious to go home.  Based on most recent urine culture, should be sensitive to cephalosporins.  Will give first  [AS]      ED Course User Index  [AS] Amber Maxwell MD             Clinical Impression:   Final diagnoses:  [R41.82] AMS (altered mental status) (Primary)  [N30.00] Acute cystitis without hematuria           ED Disposition Condition    Discharge Stable        ED Prescriptions     Medication Sig Dispense Start Date End Date Auth. Provider    cephALEXin (KEFLEX) 500 MG capsule Take 1 capsule (500 mg total) by mouth 4 (four) times daily. for 10 days 40 capsule 3/5/2022 3/15/2022 Amber Maxwell MD        Follow-up Information     Follow up With Specialties Details Why Contact Info    Bhargav Lee MD Family Medicine, Sports Medicine Call in 2 days  [address]  [place] [address]  [phone]             Amber Maxwell MD  03/05/22 1154       Amber Maxwell MD  03/06/22 0327

## 2022-03-05 NOTE — ED NOTES
Patient resting in stretcher and is in NAD at this time. Pt is awake alert and oriented to everything but situation, VSS, respirations even and unlabored. Pt updated on plan of care. Bed low and locked with side rails up x2, call bell in pt reach. Pt voices no needs at this time, daughter at bedside.  Will continue to monitor.

## 2022-03-05 NOTE — ED TRIAGE NOTES
Selma Lux, a 86 y.o. female presents to the ED w/ complaint of aphasia. Pt's daughter states that pt was not responding to pain and had trouble speaking. Pt's daughter also reports that pt had R sided weakness around 915 this AM that lasted for about 10m (none upon arrival). Pt oriented only to self, place, and time (not situation). Pt denies n/v/d, constipation, SOB, HA, fever/chills, fatigue.     Triage note:  Chief Complaint   Patient presents with    Aphasia     Arrived via ems from home with c/o episode of not answering questions with right side weakness onset 915, lasting 10 minutes, weakness resolved on arrival, pt oriented to self place and time on arrival, recent admission for SDH     Review of patient's allergies indicates:  No Known Allergies  Past Medical History:   Diagnosis Date    Acute cystitis without hematuria 2/27/2020    Acute hypoxemic respiratory failure 4/11/2018    Acute respiratory failure with hypoxia 3/2/2020    Altered mental status     Anemia     Arthritis     Bilateral hand pain 3/14/2018    Branch retinal vein occlusion, left eye 2/20/2015    Chronic bilateral low back pain without sciatica 3/23/2017    Chronic renal failure in pediatric patient, stage 3 (moderate) 4/15/2018    Cognitive communication deficit 12/19/2017    Cystoid macular edema, left eye 2/20/2015    Cystoid macular edema, left eye 2/20/2015    DJD (degenerative joint disease) of cervical spine 5/15/2013    Fatty liver 8/26/2014    Goiter 4/9/2018    Hashimoto's disease     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    Hypertension     Hypertensive encephalopathy     IBS (irritable bowel syndrome) 6/21/2017    IGT (impaired glucose tolerance) 1/12/2016    Iron deficiency anemia secondary to inadequate dietary iron intake 6/24/2013    Joint pain     Keratoconjunctivitis sicca of both eyes not specified as Sjogren's 7/29/2016    Leiomyoma of uterus, unspecified 9/16/2014    Long QT interval 6/29/2016     Due to medication (plaquenil)     Macular edema 1/12/2015    Multinodular goiter 1/12/2016    Neuropathy 8/23/2017    Plaquenil causing adverse effect in therapeutic use 10/7/2016    Pneumococcal vaccine refused 8/17/2016    Pneumonia due to Streptococcus pneumoniae 4/5/2018    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/2015    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 Pt is calm and cooperative. Affect is appropriate.  Pt responds to questions but is aphasic at times.     APPEARANCE: Patient resting comfortably in no acute distress.  Patient is clean and well groomed.    SKIN: The skin is warm and dry; color consistent with ethnicity.  Patient has normal skin turgor and moist mucus membranes.  Skin intact; no breakdown or bruising noted.     MUSCULOSKELETAL: Patient moving upper and lower extremities without difficulty; denies pain in the extremities or back.  Denies weakness.     RESPIRATORY: Airway is open and patent. Respirations spontaneous, even, easy, and non-labored.  Patient has a normal effort and rate.  No accessory muscle use noted. Denies cough.     CARDIAC:  Normal rate noted.  No peripheral edema noted. No complaints of chest pain.      ABDOMEN: Soft and non tender to palpation.  No distention noted. Pt denies abdominal pain; denies nausea, vomiting, diarrhea, or constipation.    NEUROLOGIC: Eyes open spontaneously.  Behavior appropriate to situation.  Follows commands; facial expression symmetrical.  Purposeful motor response noted; normal sensation in all extremities. Pt denies headache; denies lightheadedness or dizziness; denies visual disturbances; reports  fall 2 weeks ago; denies unilateral weakness.

## 2022-03-05 NOTE — ED NOTES
Patient resting in stretcher and is in NAD at this time. Pt is awake alert and oriented to everything except situation, VSS, respirations even and unlabored. Pt updated on plan of care. Bed low and locked with side rails up x2, call bell in pt reach. Pt voices no needs at this time, daughter at bedside.  Will continue to monitor.

## 2022-03-05 NOTE — ED NOTES
Pt care assumed at this time. Patient resting in stretcher and is in NAD at this time. Pt is awake alert and oriented to everything but situation, VSS, respirations even and unlabored. Pt updated on plan of care. Bed low and locked with side rails up x2, call bell in pt reach. Pt voices no needs at this time, daughter at bedside.  Will continue to monitor.

## 2022-03-07 LAB — BACTERIA UR CULT: ABNORMAL

## 2022-03-15 RX ORDER — FENTANYL CITRATE 50 UG/ML
INJECTION, SOLUTION INTRAMUSCULAR; INTRAVENOUS
Status: DISPENSED
Start: 2022-03-15 | End: 2022-03-16

## 2022-03-16 ENCOUNTER — PATIENT MESSAGE (OUTPATIENT)
Dept: ADMINISTRATIVE | Facility: HOSPITAL | Age: 87
End: 2022-03-16
Payer: MEDICARE

## 2022-03-22 ENCOUNTER — OUTPATIENT CASE MANAGEMENT (OUTPATIENT)
Dept: ADMINISTRATIVE | Facility: OTHER | Age: 87
End: 2022-03-22
Payer: MEDICARE

## 2022-03-22 DIAGNOSIS — I10 HYPERTENSION, ESSENTIAL: Primary | ICD-10-CM

## 2022-03-25 ENCOUNTER — PES CALL (OUTPATIENT)
Dept: ADMINISTRATIVE | Facility: CLINIC | Age: 87
End: 2022-03-25
Payer: MEDICARE

## 2022-03-31 ENCOUNTER — TELEPHONE (OUTPATIENT)
Dept: NEUROLOGY | Facility: CLINIC | Age: 87
End: 2022-03-31
Payer: MEDICARE

## 2022-03-31 ENCOUNTER — EXTERNAL CHRONIC CARE MANAGEMENT (OUTPATIENT)
Dept: PRIMARY CARE CLINIC | Facility: CLINIC | Age: 87
End: 2022-03-31
Payer: MEDICARE

## 2022-03-31 PROCEDURE — 99490 PR CHRONIC CARE MGMT, 1ST 20 MIN: ICD-10-PCS | Mod: S$PBB,,, | Performed by: FAMILY MEDICINE

## 2022-03-31 PROCEDURE — 99490 CHRNC CARE MGMT STAFF 1ST 20: CPT | Mod: PBBFAC | Performed by: FAMILY MEDICINE

## 2022-03-31 PROCEDURE — 99490 CHRNC CARE MGMT STAFF 1ST 20: CPT | Mod: S$PBB,,, | Performed by: FAMILY MEDICINE

## 2022-03-31 NOTE — TELEPHONE ENCOUNTER
This Rx Request does not qualify for assessment with the [facility]   Please review protocol details and the Care Due Message for extra clinical information    Reasons Rx Request may be deferred:  1. Labs/Vitals overdue  2. Labs/Vitals abnormal  3. Patient has been seen in the ED/Hospital since the last PCP visit  4. Medication is non-delegated  5. Medication associated diagnosis code is outside of scope  6. Provider is a non-participating provider  7. Visit criteria not met (Patient needs to be seen at least every 15 months by PCP)    Note composed:2:41 PM 03/31/2022

## 2022-03-31 NOTE — TELEPHONE ENCOUNTER
----- Message from [place]ana Chevalier sent at 3/31/2022  3:25 PM CDT -----  Regarding: appt  Contact: pt @ [phone]  Caller request appointment for patient with Dr. Giang to discuss ongoing neck pain. Please call.

## 2022-03-31 NOTE — TELEPHONE ENCOUNTER
No new care gaps identified.  Powered by [employer]. Reference number: [id number].   3/31/2022 12:37:18 PM CDT

## 2022-03-31 NOTE — TELEPHONE ENCOUNTER
Have not seen patient in some period of time, she has been in and out of the hospital.  currently requested medication, amlodipine, is not currently listed in her medications on the chart.  Please contact patient/daughter and find out if she is still taking this, and if she indeed needs a refill, thank you.    Also, if possible set up a follow-up appointment sometime soon, which could be a virtual visit if needed.

## 2022-04-01 RX ORDER — AMLODIPINE BESYLATE 10 MG/1
TABLET ORAL
Qty: 90 TABLET | Refills: 0 | Status: SHIPPED | OUTPATIENT
Start: 2022-04-01 | End: 2022-06-17

## 2022-04-01 NOTE — TELEPHONE ENCOUNTER
Spoke with pt daughter , she states that the pt is taking her amlodipine. Hosp f/u scheduled for 4-11

## 2022-04-07 RX ORDER — NOREPINEPHRINE BITARTRATE 1 MG/ML
INJECTION, SOLUTION INTRAVENOUS
Status: DISPENSED
Start: 2022-04-07 | End: 2022-04-07

## 2022-04-08 RX ORDER — PROPOFOL 10 MG/ML
INJECTION, EMULSION INTRAVENOUS
Status: DISPENSED
Start: 2022-04-08 | End: 2022-04-09

## 2022-04-08 NOTE — PLAN OF CARE
Problem: Adult Inpatient Plan of Care  Goal: Plan of Care Review  Outcome: Ongoing, Progressing     Problem: Attention and Thought Clarity Impairment (Delirium)  Goal: Improved Attention and Thought Clarity  Outcome: Ongoing, Progressing     Problem: Bowel Elimination Impaired (Stroke, Hemorrhagic)  Goal: Effective Bowel Elimination  Outcome: Ongoing, Progressing     POC reviewed and updated with the patient/caregiver. Questions regarding POC were encouraged and addressed with the patient/caregiver.  VSS, see flow-sheets. Patient is AO X 4 at this time. Fall/safety precautions maintained, no signs of injury noted during shift. Patient was repositioned for comfort, bed locked in low position with side rails X 3, bed alarm set, with call light within reach. Patient instructed to call staff for mobility, verbalized understanding. No acute signs of distress noted at this time.    Pt has been able to urinate spontaneously on this shift, did not need to straight cath or put in [place]. Pt attempted to make a bowel movement twice on the bedpan but has not made a bm yet.                 Bed in lowest position, wheels locked, appropriate side rails in place/Call bell, personal items and telephone in reach/Instruct patient to call for assistance before getting out of bed or chair/Non-slip footwear when patient is out of bed/[place] to call system/Physically safe environment - no spills, clutter or unnecessary equipment/Purposeful Proactive Rounding/Room/bathroom lighting operational, light cord in reach

## 2022-04-11 ENCOUNTER — TELEPHONE (OUTPATIENT)
Dept: INTERNAL MEDICINE | Facility: CLINIC | Age: 87
End: 2022-04-11

## 2022-04-11 ENCOUNTER — OFFICE VISIT (OUTPATIENT)
Dept: INTERNAL MEDICINE | Facility: CLINIC | Age: 87
End: 2022-04-11
Attending: FAMILY MEDICINE
Payer: MEDICARE

## 2022-04-11 DIAGNOSIS — I48.0 PAROXYSMAL ATRIAL FIBRILLATION: ICD-10-CM

## 2022-04-11 DIAGNOSIS — J84.9 PULMONARY HYPERTENSION DUE TO INTERSTITIAL LUNG DISEASE: ICD-10-CM

## 2022-04-11 DIAGNOSIS — M48.00 SPINAL STENOSIS, UNSPECIFIED SPINAL REGION: ICD-10-CM

## 2022-04-11 DIAGNOSIS — H53.9 VISION DISTURBANCE: ICD-10-CM

## 2022-04-11 DIAGNOSIS — F01.50 VASCULAR DEMENTIA WITHOUT BEHAVIORAL DISTURBANCE: ICD-10-CM

## 2022-04-11 DIAGNOSIS — Z86.16 HISTORY OF 2019 NOVEL CORONAVIRUS DISEASE (COVID-19): ICD-10-CM

## 2022-04-11 DIAGNOSIS — I27.23 PULMONARY HYPERTENSION DUE TO INTERSTITIAL LUNG DISEASE: ICD-10-CM

## 2022-04-11 DIAGNOSIS — I69.154 HEMIPLEGIA AND HEMIPARESIS FOLLOWING NONTRAUMATIC INTRACEREBRAL HEMORRHAGE AFFECTING LEFT NON-DOMINANT SIDE: ICD-10-CM

## 2022-04-11 DIAGNOSIS — I50.22 CHRONIC SYSTOLIC (CONGESTIVE) HEART FAILURE: ICD-10-CM

## 2022-04-11 DIAGNOSIS — Z79.01 ANTICOAGULANT LONG-TERM USE: ICD-10-CM

## 2022-04-11 DIAGNOSIS — I10 HYPERTENSION, ESSENTIAL: ICD-10-CM

## 2022-04-11 DIAGNOSIS — I48.0 AF (PAROXYSMAL ATRIAL FIBRILLATION): ICD-10-CM

## 2022-04-11 DIAGNOSIS — M05.79 SEROPOSITIVE RHEUMATOID ARTHRITIS OF MULTIPLE SITES: ICD-10-CM

## 2022-04-11 DIAGNOSIS — J84.116 CRYPTOGENIC ORGANIZING PNEUMONIA: ICD-10-CM

## 2022-04-11 DIAGNOSIS — E04.2 MULTINODULAR GOITER: ICD-10-CM

## 2022-04-11 DIAGNOSIS — S06.5X9D INTRACRANIAL SUBDURAL HEMATOMA WITH LOSS OF CONSCIOUSNESS, SUBSEQUENT ENCOUNTER: Primary | ICD-10-CM

## 2022-04-11 PROBLEM — R29.898 WEAKNESS OF SHOULDER: Status: RESOLVED | Noted: 2021-10-11 | Resolved: 2022-04-11

## 2022-04-11 PROBLEM — B37.0 THRUSH: Status: RESOLVED | Noted: 2022-01-30 | Resolved: 2022-04-11

## 2022-04-11 PROBLEM — N39.0 UTI (URINARY TRACT INFECTION): Status: RESOLVED | Noted: 2018-12-29 | Resolved: 2022-04-11

## 2022-04-11 PROBLEM — S06.5XAA INTRACRANIAL SUBDURAL HEMATOMA: Status: ACTIVE | Noted: 2022-01-04

## 2022-04-11 PROBLEM — U07.1 COVID-19 VIRUS INFECTION: Status: RESOLVED | Noted: 2022-01-23 | Resolved: 2022-04-11

## 2022-04-11 PROBLEM — J96.01 ACUTE HYPOXEMIC RESPIRATORY FAILURE: Status: RESOLVED | Noted: 2018-04-11 | Resolved: 2022-04-11

## 2022-04-11 PROBLEM — M54.2 CERVICAL PAIN: Status: RESOLVED | Noted: 2021-10-11 | Resolved: 2022-04-11

## 2022-04-11 PROBLEM — R29.898 DECREASED ROM OF NECK: Status: RESOLVED | Noted: 2021-10-19 | Resolved: 2022-04-11

## 2022-04-11 PROCEDURE — 99215 PR OFFICE/OUTPT VISIT, EST, LEVL V, 40-54 MIN: ICD-10-PCS | Mod: 95,,, | Performed by: FAMILY MEDICINE

## 2022-04-11 PROCEDURE — 99215 OFFICE O/P EST HI 40 MIN: CPT | Mod: 95,,, | Performed by: FAMILY MEDICINE

## 2022-04-11 NOTE — PROGRESS NOTES
Answers for HPI/ROS submitted by the patient on 4/11/2022  activity change: No  unexpected weight change: No  neck pain: No  hearing loss: No  trouble swallowing: No  eye discharge: No  visual disturbance: No  chest tightness: No  wheezing: No  chest pain: No  palpitations: No  blood in stool: No  constipation: No  vomiting: No  diarrhea: No  polydipsia: No  polyuria: No  difficulty urinating: No  hematuria: No  menstrual problem: No  dysuria: No  joint swelling: No  arthralgias: No  headaches: No  weakness: No  confusion: No  dysphoric mood: No      Subjective:       Patient ID: Selma Lux is a 86 y.o. female.    Chief Complaint: No chief complaint on file.    The patient location is: home  The chief complaint leading to consultation is: HOSPFU and med refills    Visit type: audiovisual    Face to Face time with patient: 41  45 or more minutes of total time spent on the encounter, which includes face to face time and non-face to face time preparing to see the patient (eg, review of tests), Obtaining and/or reviewing separately obtained history, Documenting clinical information in the electronic or other health record, Independently interpreting results (not separately reported) and communicating results to the patient/family/caregiver, or Care coordination (not separately reported).         Each patient to whom he or she provides medical services by telemedicine is:  (1) informed of the relationship between the physician and patient and the respective role of any other health care provider with respect to management of the patient; and (2) notified that he or she may decline to receive medical services by telemedicine and may withdraw from such care at any time.    Notes:  Hospital follow-up visit with daughter and patient.  Some frustration encountered because I had asked about the recent hospitalization but not all records are available.  Noted that there are 2 charts with various parts of her recent  events, does show marked as merged, but has not been merged yet.  The other record number is [id number]. Daughter  she is been attempting to rectify this since December.  Problems this time began when patient fell in December suffering a subdural hematoma.  John malone was in neuro ICU then transferred to [facility] rehab.  Subsequently developed COVID and respiratory problems and was readmitted.  Following that placed in the step-down unit and had to be placed at an outside skilled nursing or rehabilitation facility called [facility].  Since that is external those records are not available.  She is doing fairly well at this time, some weakness reported.  Headache and neck stiffness which is chronic.  No breathing problems at this time.  She missed several provider appointments.  Also had a urinary tract infection during the hospitalization which john malone is treated and there are no current symptoms to suggest recurrence.  Patient appears quite lucid today but was not moving her right side upper extremity as much as the left noted on the video.  I spent some time looking at both medical records and documenting and will try to get those records merged into a single entity.  We will use this current medical record for referrals and ongoing care.  This came to my attention when a medication refill was requested which did not show up in the current chart.  This was amlodipine which patient states she is taking.  No recurrent falls or significant change of baseline mental status.  Will copy this chart to patient's multiple consultants, consult referrals were placed and patient will resume home care.  No immediate needs, check laboratory sometime soon.    Review of Systems   Constitutional: Positive for activity change and fatigue. Negative for unexpected weight change.   HENT: Negative for hearing loss and trouble swallowing.    Eyes: Positive for visual disturbance. Negative for discharge.   Respiratory:  Negative for chest tightness and wheezing.    Cardiovascular: Negative for chest pain and palpitations.   Gastrointestinal: Negative for blood in stool, constipation, diarrhea and vomiting.   Endocrine: Negative for polydipsia and polyuria.   Genitourinary: Negative for difficulty urinating, dysuria, hematuria and menstrual problem.   Musculoskeletal: Positive for neck pain and neck stiffness. Negative for arthralgias and joint swelling.   Neurological: Positive for weakness and headaches.   Psychiatric/Behavioral: Negative for confusion and dysphoric mood.       Objective:      Physical Exam  Constitutional:       General: She is not in acute distress.     Appearance: She is well-developed.   Neurological:      Mental Status: She is alert and oriented to person, place, and time.      GCS: GCS eye subscore is 4. GCS verbal subscore is 5. GCS motor subscore is 6.      Motor: Weakness present.   Psychiatric:         Attention and Perception: Attention normal.         Speech: Speech normal.         Behavior: Behavior normal.         Assessment:       1. Intracranial subdural hematoma with loss of consciousness, subsequent encounter    2. Cryptogenic organizing pneumonia    3. Pulmonary hypertension due to interstitial lung disease    4. Spinal stenosis, unspecified spinal region    5. Anticoagulant long-term use    6. 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    7. Chronic systolic (congestive) heart failure    8. Paroxysmal atrial fibrillation    9. Hypertension, essential    10. History of 2019 novel coronavirus disease (COVID-19)    11. AF (paroxysmal atrial fibrillation)    12. Vascular dementia without behavioral disturbance    13. Seropositive rheumatoid arthritis of multiple sites    14. Multinodular goiter    15. Vision disturbance        Plan:     Medication List with Changes/Refills   Current Medications    ALBUTEROL (PROVENTIL/VENTOLIN HFA) 90  MCG/ACTUATION INHALER    Inhale 2 puffs into the lungs every 6 (six) hours as needed for Wheezing. Rescue    AMLODIPINE (NORVASC) 10 MG TABLET    TAKE 1 TABLET(10 MG) BY MOUTH EVERY DAY    ATORVASTATIN (LIPITOR) 40 MG TABLET    Take 1 tablet (40 mg total) by mouth once daily.    BACLOFEN (LIORESAL) 10 MG TABLET    Take 0.5-1 tablets (5-10 mg total) by mouth 3 (three) times daily as needed.    ELIQUIS 5 MG TAB    TAKE 1 TABLET TWICE A DAY    FUROSEMIDE (LASIX) 40 MG TABLET    Take 1 tablet (40 mg total) by mouth every other day. TAKE 1 TABLET(40 MG) BY MOUTH DAILY    GABAPENTIN (NEURONTIN) 300 MG CAPSULE    Take 1 capsule (300 mg total) by mouth every evening.    HYDROXYCHLOROQUINE (PLAQUENIL) 200 MG TABLET    Take 1 tablet (200 mg total) by mouth 2 (two) times daily.    MAGNESIUM OXIDE (MAG-OX) 400 MG (241.3 MG MAGNESIUM) TABLET    Take 400 mg by mouth once daily.    MELATONIN (MELATIN) 3 MG TABLET    Take 2 tablets (6 mg total) by mouth nightly as needed for Insomnia.    MONTELUKAST (SINGULAIR) 10 MG TABLET    Take 1 tablet (10 mg total) by mouth every evening.    MYCOPHENOLATE (CELLCEPT) 500 MG TAB    TAKE 1 TABLET TWICE A DAY    OMEPRAZOLE (PRILOSEC) 20 MG CAPSULE    Take 1 capsule (20 mg total) by mouth once daily.    POLYETHYLENE GLYCOL (GLYCOLAX) 17 GRAM PWPK    Take 17 g by mouth daily as needed.    PREDNISONE (DELTASONE) 5 MG TABLET    Take 1 tablet (5 mg total) by mouth once daily.    THIAMINE 100 MG TABLET    Take 100 mg by mouth once daily.    TRAMADOL (ULTRAM) 50 MG TABLET    Take 1 tablet (50 mg total) by mouth every 6 (six) hours as needed for Pain.   Discontinued Medications    SUMATRIPTAN (IMITREX) 50 MG TABLET    Take 1 tablet (50 mg total) by mouth every 6 (six) hours as needed for Migraine.     Diagnoses and all orders for this visit:    Intracranial subdural hematoma with loss of consciousness, subsequent encounter  -     Ambulatory referral/consult to Neurology; Future  -     CBC Without  Differential; Future  -     Comprehensive Metabolic Panel; Future    Cryptogenic organizing pneumonia  -     Ambulatory referral/consult to Pulmonology; Future    Pulmonary hypertension due to interstitial lung disease  -     Ambulatory referral/consult to Pulmonology; Future  -     Ambulatory referral/consult to Cardiology; Future    Spinal stenosis, unspecified spinal region    Anticoagulant long-term use  -     Ambulatory referral/consult to Cardiology; Future    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  -     Ambulatory referral/consult to Neurology; Future    Chronic systolic (congestive) heart failure    Paroxysmal atrial fibrillation  -     Ambulatory referral/consult to Cardiology; Future    Hypertension, essential    History of 2019 novel coronavirus disease (COVID-19)  Comments:  2/2022    AF (paroxysmal atrial fibrillation)    Vascular dementia without behavioral disturbance  -     Ambulatory referral/consult to Neurology; Future    Seropositive rheumatoid arthritis of multiple sites  -     Ambulatory referral/consult to Rheumatology; Future    Multinodular goiter  -     Ambulatory referral/consult to Endocrinology; Future    Vision disturbance  -     Ambulatory referral/consult to Ophthalmology; Future      See meds, orders, follow up, routing and instructions sections of encounter and AVS. Discussed with patient and provided on AVS.    Lab Results   Component Value Date     03/05/2022    K 3.4 (L) 03/05/2022     03/05/2022    BUN 10 03/05/2022    CREATININE 0.7 03/05/2022    GLU 77 03/05/2022    HGBA1C 5.6 08/27/2019    MG 1.7 02/08/2022    AST 15 03/05/2022    ALT 9 (L) 03/05/2022    ALBUMIN 2.6 (L) 03/05/2022    PROT 7.2 03/05/2022    BILITOT 0.8 03/05/2022    CHOL 94 (L) 04/08/2021    HDL 54 04/08/2021    LDLCALC 31.8 (L) 04/08/2021    TRIG 41 04/08/2021    WBC 12.63 03/05/2022    HGB 9.9 (L) 03/05/2022    HCT 32.0 (L)  03/05/2022    HCT 24 (L) 01/23/2022     03/05/2022    TSH 1.553 04/08/2021     (H) 01/23/2022    URICACID 5.5 06/08/2021

## 2022-04-12 ENCOUNTER — TELEPHONE (OUTPATIENT)
Dept: ENDOCRINOLOGY | Facility: CLINIC | Age: 87
End: 2022-04-12
Payer: MEDICARE

## 2022-04-12 ENCOUNTER — TELEPHONE (OUTPATIENT)
Dept: INTERNAL MEDICINE | Facility: CLINIC | Age: 87
End: 2022-04-12
Payer: MEDICARE

## 2022-04-12 DIAGNOSIS — E04.2 MULTINODULAR GOITER: ICD-10-CM

## 2022-04-12 DIAGNOSIS — E55.9 VITAMIN D INSUFFICIENCY: Primary | ICD-10-CM

## 2022-04-12 NOTE — TELEPHONE ENCOUNTER
----- Message from Satya Swan MD sent at 4/12/2022  9:26 AM CDT -----  Yes, that is fine. I treated her in inpatient rehabb, so I am familiar with her.    ----- Message -----  From: Fritz Ac III, MD  Sent: 4/11/2022   5:17 PM CDT  To: Bhargav Lee MD, #    I'm adding an amazing moderate to severe TBI and rehab expert here, Dr. Fernando Swan, who should be able to help Dr. Lux out with her post TBI issues, as they are beyond mild concussion, at this point, certainly.   ----- Message -----  From: Bhargav Lee MD  Sent: 4/11/2022  10:59 AM CDT  To: Lonnie Rouse MD, #

## 2022-04-12 NOTE — TELEPHONE ENCOUNTER
Had orders for TSH, free t4.       Lab Results   Component Value Date    TSH 1.553 2021    P8SFXAD 5.8 2019    FREET4 1.43 2020     Last labs:    Lab Results   Component Value Date    CALCIUM 8.9 2022    ALBUMIN 2.6 (L) 2022    CREATININE 0.7 2022    [id number] 19 (L) 2021       Also can recheck vitamin D

## 2022-04-12 NOTE — TELEPHONE ENCOUNTER
Please see virtual telemedicine visit from yesterday, patient needs scheduling for referrals to Pulmonary, Cardiology, Neurology, Rheumatology, Endocrinology and Ophthalmology.     Of those consults, Satya Swan was the neurologist, the others would be listed in her chart, who had seen her previously, thank you

## 2022-04-12 NOTE — TELEPHONE ENCOUNTER
----- Message from Veronica Medeiros MA sent at 4/12/2022 10:34 AM CDT -----  Good morning    This pt is suppose to have lab prior. Can you please put them in.     Thanks   ----- Message -----  From: Sena Cunningham MA  Sent: 4/12/2022  10:07 AM CDT  To: Veronica Medeiros MA    This pt (Dr Lux) would like to be seen on May 13th at 1:30pm with Dr Tamayo. She also needs labs a week before the appt with Dr Tamayo. I attempted to schedule these appts but could not. Thanks

## 2022-04-25 ENCOUNTER — OFFICE VISIT (OUTPATIENT)
Dept: CARDIOLOGY | Facility: CLINIC | Age: 87
End: 2022-04-25
Payer: MEDICARE

## 2022-04-25 ENCOUNTER — PATIENT OUTREACH (OUTPATIENT)
Dept: ADMINISTRATIVE | Facility: OTHER | Age: 87
End: 2022-04-25
Payer: MEDICARE

## 2022-04-25 ENCOUNTER — PATIENT MESSAGE (OUTPATIENT)
Dept: CARDIOLOGY | Facility: CLINIC | Age: 87
End: 2022-04-25

## 2022-04-25 DIAGNOSIS — I70.0 AORTIC ATHEROSCLEROSIS: ICD-10-CM

## 2022-04-25 DIAGNOSIS — E78.00 PURE HYPERCHOLESTEROLEMIA: ICD-10-CM

## 2022-04-25 DIAGNOSIS — I50.22 CHRONIC SYSTOLIC (CONGESTIVE) HEART FAILURE: Primary | ICD-10-CM

## 2022-04-25 DIAGNOSIS — Z79.01 ANTICOAGULANT LONG-TERM USE: ICD-10-CM

## 2022-04-25 DIAGNOSIS — E78.00 HYPERCHOLESTEREMIA: ICD-10-CM

## 2022-04-25 DIAGNOSIS — I67.89 CEREBRAL MICROVASCULAR DISEASE: ICD-10-CM

## 2022-04-25 DIAGNOSIS — I10 PRIMARY HYPERTENSION: ICD-10-CM

## 2022-04-25 DIAGNOSIS — I48.0 PAROXYSMAL ATRIAL FIBRILLATION: ICD-10-CM

## 2022-04-25 PROCEDURE — 99214 OFFICE O/P EST MOD 30 MIN: CPT | Mod: 95,,, | Performed by: INTERNAL MEDICINE

## 2022-04-25 PROCEDURE — 99214 PR OFFICE/OUTPT VISIT, EST, LEVL IV, 30-39 MIN: ICD-10-PCS | Mod: 95,,, | Performed by: INTERNAL MEDICINE

## 2022-04-25 NOTE — PROGRESS NOTES
Subjective:   Patient ID:  Selma Lux is a 86 y.o. female who presents for follow-up of No chief complaint on file.  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 Last seen in November for intermittent lower ext edema on lasix 20. No other CHF signs/symptoms. Echo with EF reported as 40%; reviewed with echo staff, EF appeared stable, low normal 50-55%. Trialed increased lasix 40 qd; BMP stable. Felt SANAZ improved slightly for about one week then no change.     Returns for follow up today. Ongoing SANAZ, stable. Unable to tolerate knee high compression stockings previously. Denies PND, orthopnea, CP, palpitiatons, presyncope/syncope. Chronic dyspnea slightly worse; predominantly decreased energy, fatigue and generalized weakness. Feels like it requires more effort for routine activities. Have adjusted PT plans to see if can help with her strength. Also recently adjusted on her RA meds, limited by hip pain.      Also today here to discuss current cardiac meds and if we can decrease her number of medications. Carvedilol off for last week, wanted to see if she could stop. Started at time of paroxysmal AF; no other BB indication. Also on ASA + plavix for years; unclear why plavix initially started - ?at time of MRI with chronic microvascular change and remote punctate stroke. No prior stenting. No clinical prior CVA.         HPI:   BP has been running normal at home.  Denies palpitations or fluttering in the chest  No chest pain,  Orthopnea, PND of heart failure symptoms.   SOB on exertion.   Her leg swelling is unchanged and she cannot tolerate compression stalkings and cannot raise her leg lying flat because she gets SOB with her lung disease.      The patient location is: [place]  The chief complaint leading to consultation is: leg edema, PAF  AV visit  Total time spent with patient: 19 min  Each patient to whom he or she provides medical services by telemedicine is:  (1) informed of the relationship between the physician and patient and the respective role of any other health care provider with respect to management of the patient; and (2) notified that he or she may decline to receive medical services by telemedicine and may withdraw from such care at any time.     Notes: see abiove        HPI:   BP has doing well at home  On Eliquis and ASA, occasional bruising but overall doing well.   No chest pain, Orthopnea, PND of heart failure symptoms.   Denies palpitations or fluttering in the chest  Patient does limited activity, assistance help her take a bath.         The patient location is: home  The chief complaint leading to consultation is: HTN, AF     Visit type:AV VISIT  Face to Face time with patient: 20 minutes of total time spent on the encounter, which includes face to face time and non-face to face time preparing to see the patient (eg, review of tests), Obtaining and/or reviewing separately obtained history, Documenting clinical information in the electronic or other health record, Independently interpreting results (not separately reported) and communicating results to the patient/family/caregiver, or Care coordination (not separately reported).      Each patient to whom he or she provides medical services by telemedicine is:  (1) informed of the relationship between the physician and patient and the respective role of any other health care provider with respect to management of the patient; and (2) notified that he or she may  decline to receive medical services by telemedicine and may withdraw from such care at any time.     Notes:     Recent hospital discharge 02/08/22  Selma Lux is a 84 y.o. female with hx of HFrEF, COPD, Cryptogenic organizing pneumonia on chronic prednisone, RA on plaquenil and cellcept, HTN, HLD, TIA, vascular dementia, pulmonary HTN secondary to ILD, AF (on Eliquis), recent subdural hematoma that was treated non operatively complicated by a PEA cardiac arrest and hypo natremia who is sent to the ED from [facility] for productive cough for 1 week with associated fevers.  She has had encephalopathy during her hospital stay and admission to rehab. She tested positive for COVID on 1/23.  Paramedics state that she has been having very poor oral intake. Patient was admitted to ICU for hypotension and respiratory distress, anticipating rapid deterioration 2/2 her medical history. Patient is on 4L NC, SpO2 stable, she had episode of hypotensions but not sustained, no pressor indicated. Mentation improved in the morning. Patient required sedation for procedure. Family visit seemed to calm her down. Patient no longer required Precedex gtt. Qtc prolongation resolved, likely 2/2 to chronic plaquenil. Patient sat well on room air. Patient ready to transfer to lower level of care unit. ID consulted. Maricruz and rocephin Dc'd 01/28. Transitioned to amp- completing 14d course. Found to have thrush w/up-trending WBC. Started on fluconazole 01/30. Improvement in leukocytosis.  T bili noted to be rising; likely due to fluconazole.  Due to improvement of thrush on physical exam, fluconazole stopped after 7 day course of treatment versus 14. T bili with subsequent improvement.  Deemed stable for discharge to rehab 01/31.  Issues due to COVID positive status and accepting facilities.  Patient discharged to [facility] on 02/08.  Discharge plan discussed with daughter who is in agreement.    HPI:  Patient is having some neck  pain.   Patient is sedentary. Patient c/o muscle aches.   No chest pain, Orthopnea, PND of heart failure symptoms.   Denies palpitations or fluttering in the chest  Recent Subdural hematoma without any significant neurologic deficits.   She lost weight since hospitalization.      The patient location is: home  The chief complaint leading to consultation is: PAF, HTN    Visit type: AV visit  Face to Face time with patient: 20 minutes of total time spent on the encounter, which includes face to face time and non-face to face time preparing to see the patient (eg, review of tests), Obtaining and/or reviewing separately obtained history, Documenting clinical information in the electronic or other health record, Independently interpreting results (not separately reported) and communicating results to the patient/family/caregiver, or Care coordination (not separately reported).     Each patient to whom he or she provides medical services by telemedicine is:  (1) informed of the relationship between the physician and patient and the respective role of any other health care provider with respect to management of the patient; and (2) notified that he or she may decline to receive medical services by telemedicine and may withdraw from such care at any time.      Patient Active Problem List   Diagnosis    Iron deficiency anemia secondary to inadequate dietary iron intake    PUD (peptic ulcer disease)    Submucous leiomyoma of uterus    Cystoid macular edema, left eye    Seropositive rheumatoid arthritis of multiple sites    Vitamin D insufficiency    Hypertension, essential    Long QT interval    Generalized weakness    Vascular dementia    History of stroke    Chronic renal failure syndrome, stage 3 (moderate)    Oropharyngeal dysphagia    AF (paroxysmal atrial fibrillation)    Aortic atherosclerosis    Retinal macroaneurysm of left eye    Dystonia    Poor nutrition    Cryptogenic organizing pneumonia     Multinodular goiter    Leukocytosis    Pulmonary hypertension due to interstitial lung disease    Post-traumatic osteoarthritis of both hips    Cerebral microvascular disease    Immunosuppressed status    History of transient ischemic attack (TIA)    Hyperlipidemia    Thyroid antibody positive    Primary osteoarthritis of left hip    Mood disorder    Spinal stenosis    Lumbar spondylosis    Anticoagulant long-term use    Chronic pain    High risk medication use    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  CrCl cannot be calculated (Patient's most recent lab result is older than the maximum 7 days allowed.).    Lab Results   Component Value Date     03/05/2022    K 3.4 (L) 03/05/2022     03/05/2022    CO2 23 03/05/2022    BUN 10 03/05/2022    CREATININE 0.7 03/05/2022    GLU 77 03/05/2022    HGBA1C 5.3 12/13/2021    MG 1.7 02/08/2022    AST 15 03/05/2022    ALT 9 (L) 03/05/2022    ALBUMIN 2.6 (L) 03/05/2022    PROT 7.2 03/05/2022    BILITOT 0.8 03/05/2022    WBC 12.63 03/05/2022    HGB 9.9 (L) 03/05/2022    HCT 32.0 (L) 03/05/2022    HCT 24 (L) 01/23/2022    MCV 87 03/05/2022     03/05/2022    INR 1.4 (H) 01/23/2022    TSH 2.077 12/13/2021    CHOL 116 (L) 12/13/2021    HDL 67 12/13/2021    LDLCALC 38.4 (L) 12/13/2021    TRIG 53  12/13/2021       Current Outpatient Medications   Medication Sig    acetaminophen (TYLENOL) 500 MG tablet Take 1 tablet (500 mg total) by mouth every 4 (four) hours as needed for Pain.    albuterol (PROVENTIL/VENTOLIN HFA) 90 mcg/actuation inhaler Inhale 2 puffs into the lungs every 6 (six) hours as needed for Wheezing. Rescue    albuterol-ipratropium (DUO-NEB) 2.5 mg-0.5 mg/3 mL nebulizer solution Take 3 mLs by nebulization every 4 (four) hours as needed for Wheezing. Rescue    amLODIPine (NORVASC) 10 MG tablet Take 10 mg by mouth once daily.    amLODIPine (NORVASC) 10 MG tablet TAKE 1 TABLET(10 MG) BY MOUTH EVERY DAY    atorvastatin (LIPITOR) 40 MG tablet Take 1 tablet (40 mg total) by mouth once daily.    atorvastatin (LIPITOR) 40 MG tablet Take 40 mg by mouth once daily.    baclofen (LIORESAL) 10 MG tablet Take 0.5-1 tablets (5-10 mg total) by mouth 3 (three) times daily as needed.    bisacodyL (DULCOLAX) 10 mg Supp Place 1 suppository (10 mg total) rectally daily as needed (constipation).    calcium carbonate (TUMS) 200 mg calcium (500 mg) chewable tablet Take 2 tablets (1,000 mg total) by mouth once daily.    ELIQUIS 5 mg Tab Take 5 mg by mouth 2 (two) times daily.    ELIQUIS 5 mg Tab TAKE 1 TABLET TWICE A DAY    famotidine (PEPCID) 20 MG tablet Take 1 tablet (20 mg total) by mouth 2 (two) times daily.    furosemide (LASIX) 40 MG tablet Take 1 tablet (40 mg total) by mouth every other day. TAKE 1 TABLET(40 MG) BY MOUTH DAILY    gabapentin (NEURONTIN) 300 MG capsule Take 1 capsule (300 mg total) by mouth every evening.    hydrOXYchloroQUINE (PLAQUENIL) 200 mg tablet Take 1 tablet (200 mg total) by mouth 2 (two) times daily.    lacosamide (VIMPAT) 10 mg/mL Soln oral solution Take 10 mLs (100 mg total) by mouth every 12 (twelve) hours.    LIDOcaine (LIDODERM) 5 % Place 1 patch onto the skin once daily. Remove & Discard patch within 12 hours or as directed by MD    magnesium oxide (MAG-OX) 400  mg (241.3 mg magnesium) tablet Take 400 mg by mouth once daily.    melatonin (MELATIN) 3 mg tablet Take 2 tablets (6 mg total) by mouth nightly as needed for Insomnia.    melatonin 12 mg Tab Take 9 mg by mouth nightly.    montelukast (SINGULAIR) 10 mg tablet Take 1 tablet (10 mg total) by mouth every evening.    montelukast (SINGULAIR) 10 mg tablet Take 10 mg by mouth once daily.    mycophenolate (CELLCEPT) 500 mg Tab TAKE 1 TABLET TWICE A DAY    mycophenolate mofetil (CELLCEPT) 200 mg/mL SusR Take 2.5 mLs (500 mg total) by mouth 2 (two) times daily.    omeprazole (PRILOSEC) 20 MG capsule Take 1 capsule (20 mg total) by mouth once daily.    polyethylene glycol (GLYCOLAX) 17 gram PwPk Take 17 g by mouth daily as needed.    predniSONE (DELTASONE) 10 MG tablet Take 1 tablet (10 mg total) by mouth once daily.    predniSONE (DELTASONE) 5 MG tablet Take 1 tablet (5 mg total) by mouth once daily.    simethicone (MYLICON) 80 MG chewable tablet Take 1 tablet (80 mg total) by mouth 3 (three) times daily as needed for Flatulence.    thiamine 100 MG tablet Take 100 mg by mouth once daily.    traMADoL (ULTRAM) 50 mg tablet Take 1 tablet (50 mg total) by mouth every 6 (six) hours as needed for Pain.    vitamin D (VITAMIN D3) 1000 units Tab Take 2 tablets (2,000 Units total) by mouth once daily.     Current Facility-Administered Medications   Medication    onabotulinumtoxina injection 200 Units       Review of Systems   Constitutional: Negative for chills, decreased appetite, malaise/fatigue, night sweats, weight gain and weight loss.   Eyes: Negative for blurred vision, double vision, visual disturbance and visual halos.   Cardiovascular: Negative for chest pain, claudication, cyanosis, dyspnea on exertion, irregular heartbeat, leg swelling, near-syncope, orthopnea, palpitations, paroxysmal nocturnal dyspnea and syncope.   Respiratory: Negative for cough, hemoptysis, snoring, sputum production and wheezing.     Endocrine: Negative for cold intolerance, heat intolerance, polydipsia and polyphagia.   Hematologic/Lymphatic: Negative for adenopathy and bleeding problem. Does not bruise/bleed easily.   Skin: Negative for flushing, itching, poor wound healing and rash.   Musculoskeletal: Negative for arthritis, back pain, falls, gout, joint pain, joint swelling, muscle cramps, muscle weakness, myalgias, neck pain and stiffness.   Gastrointestinal: Negative for bloating, abdominal pain, anorexia, diarrhea, dysphagia, excessive appetite, flatus, hematemesis, jaundice, melena and nausea.   Genitourinary: Negative for hesitancy and incomplete emptying.   Neurological: Negative for aphonia, brief paralysis, difficulty with concentration, disturbances in coordination, excessive daytime sleepiness, dizziness, focal weakness, light-headedness, loss of balance and weakness.   Psychiatric/Behavioral: Negative for altered mental status, depression, hallucinations, hypervigilance, memory loss, substance abuse and suicidal ideas. The patient does not have insomnia and is not nervous/anxious.        Objective:   Physical Exam  Vitals reviewed: n/a.         Assessment:     1. Chronic systolic (congestive) heart failure    2. Anticoagulant long-term use    3. Paroxysmal atrial fibrillation    4. Hypercholesteremia    5. Primary hypertension    6. Aortic atherosclerosis    7. Pure hypercholesterolemia    8. Cerebral microvascular disease        Plan:     Patient euvolemic with normal pressures. No changes in meds.  Her LDL is too low, will decrease the dose if repeat lipids the same.   Limit sodium intake to less then 2 gram sodium and 1500cc fluid restriction.  Graded exercise program as tolerated.  Call if  more than 3 lbs in 1 day or 5 lbs in 1 week.    Diagnoses and all orders for this visit:    Chronic systolic (congestive) heart failure    Anticoagulant long-term use  -     Ambulatory referral/consult to Cardiology    Paroxysmal atrial  fibrillation  -     Ambulatory referral/consult to Cardiology    Hypercholesteremia  -     Lipid Panel; Future    Primary hypertension    Aortic atherosclerosis    Pure hypercholesterolemia    Cerebral microvascular disease      RTC 1 yr.

## 2022-04-30 ENCOUNTER — EXTERNAL CHRONIC CARE MANAGEMENT (OUTPATIENT)
Dept: PRIMARY CARE CLINIC | Facility: CLINIC | Age: 87
End: 2022-04-30
Payer: MEDICARE

## 2022-04-30 PROCEDURE — 99490 PR CHRONIC CARE MGMT, 1ST 20 MIN: ICD-10-PCS | Mod: S$PBB,,, | Performed by: FAMILY MEDICINE

## 2022-04-30 PROCEDURE — 99490 CHRNC CARE MGMT STAFF 1ST 20: CPT | Mod: PBBFAC | Performed by: FAMILY MEDICINE

## 2022-04-30 PROCEDURE — 99490 CHRNC CARE MGMT STAFF 1ST 20: CPT | Mod: S$PBB,,, | Performed by: FAMILY MEDICINE

## 2022-05-04 RX ORDER — BACLOFEN 10 MG/1
5-10 TABLET ORAL 3 TIMES DAILY PRN
Qty: 90 TABLET | Refills: 2 | OUTPATIENT
Start: 2022-05-04

## 2022-05-04 NOTE — TELEPHONE ENCOUNTER
I have not seen her in a year.  Does she need the baclofen? Has she been taking it? she does not take it often, if she has been needing it.  Will you refill if she still needs it?

## 2022-05-06 ENCOUNTER — LAB VISIT (OUTPATIENT)
Dept: LAB | Facility: OTHER | Age: 87
End: 2022-05-06
Attending: INTERNAL MEDICINE
Payer: MEDICARE

## 2022-05-06 DIAGNOSIS — E04.2 MULTINODULAR GOITER: ICD-10-CM

## 2022-05-06 DIAGNOSIS — E55.9 VITAMIN D INSUFFICIENCY: ICD-10-CM

## 2022-05-06 DIAGNOSIS — E78.00 HYPERCHOLESTEREMIA: ICD-10-CM

## 2022-05-06 LAB
25(OH)D3+25(OH)D2 SERPL-MCNC: 24 NG/ML (ref 30–96)
CHOLEST SERPL-MCNC: 104 MG/DL (ref 120–199)
CHOLEST/HDLC SERPL: 1.8 {RATIO} (ref 2–5)
HDLC SERPL-MCNC: 59 MG/DL (ref 40–75)
HDLC SERPL: 56.7 % (ref 20–50)
LDLC SERPL CALC-MCNC: 35.6 MG/DL (ref 63–159)
NONHDLC SERPL-MCNC: 45 MG/DL
T4 FREE SERPL-MCNC: 1.01 NG/DL (ref 0.71–1.51)
TRIGL SERPL-MCNC: 47 MG/DL (ref 30–150)
TSH SERPL DL<=0.005 MIU/L-ACNC: 1.23 UIU/ML (ref 0.4–4)

## 2022-05-06 PROCEDURE — 80061 LIPID PANEL: CPT | Performed by: INTERNAL MEDICINE

## 2022-05-06 PROCEDURE — 36415 COLL VENOUS BLD VENIPUNCTURE: CPT | Performed by: INTERNAL MEDICINE

## 2022-05-06 PROCEDURE — 82306 VITAMIN D 25 HYDROXY: CPT | Performed by: INTERNAL MEDICINE

## 2022-05-06 PROCEDURE — 84443 ASSAY THYROID STIM HORMONE: CPT | Performed by: INTERNAL MEDICINE

## 2022-05-06 PROCEDURE — 84439 ASSAY OF FREE THYROXINE: CPT | Performed by: INTERNAL MEDICINE

## 2022-05-09 ENCOUNTER — TELEPHONE (OUTPATIENT)
Dept: ENDOCRINOLOGY | Facility: CLINIC | Age: 87
End: 2022-05-09
Payer: MEDICARE

## 2022-05-12 ENCOUNTER — TELEPHONE (OUTPATIENT)
Dept: OPTOMETRY | Facility: CLINIC | Age: 87
End: 2022-05-12
Payer: MEDICARE

## 2022-05-12 ENCOUNTER — OFFICE VISIT (OUTPATIENT)
Dept: OPHTHALMOLOGY | Facility: CLINIC | Age: 87
End: 2022-05-12
Payer: MEDICARE

## 2022-05-12 DIAGNOSIS — H53.9 VISION DISTURBANCE: ICD-10-CM

## 2022-05-12 DIAGNOSIS — H35.012 RETINAL MACROANEURYSM OF LEFT EYE: ICD-10-CM

## 2022-05-12 DIAGNOSIS — H35.352 CYSTOID MACULAR EDEMA, LEFT EYE: Primary | ICD-10-CM

## 2022-05-12 PROCEDURE — 92201 OPSCPY EXTND RTA DRAW UNI/BI: CPT | Mod: PBBFAC | Performed by: OPHTHALMOLOGY

## 2022-05-12 PROCEDURE — 92201 OPSCPY EXTND RTA DRAW UNI/BI: CPT | Mod: S$PBB,,, | Performed by: OPHTHALMOLOGY

## 2022-05-12 PROCEDURE — 99213 OFFICE O/P EST LOW 20 MIN: CPT | Mod: PBBFAC | Performed by: OPHTHALMOLOGY

## 2022-05-12 PROCEDURE — 92201 PR OPHTHALMOSCOPY, EXT, W/RET DRAW/SCLERAL DEPR, I&R, UNI/BI: ICD-10-PCS | Mod: S$PBB,,, | Performed by: OPHTHALMOLOGY

## 2022-05-12 PROCEDURE — 99999 PR PBB SHADOW E&M-EST. PATIENT-LVL III: ICD-10-PCS | Mod: PBBFAC,,, | Performed by: OPHTHALMOLOGY

## 2022-05-12 PROCEDURE — 92134 CPTRZ OPH DX IMG PST SGM RTA: CPT | Mod: PBBFAC | Performed by: OPHTHALMOLOGY

## 2022-05-12 PROCEDURE — 92014 PR EYE EXAM, EST PATIENT,COMPREHESV: ICD-10-PCS | Mod: S$PBB,,, | Performed by: OPHTHALMOLOGY

## 2022-05-12 PROCEDURE — 92014 COMPRE OPH EXAM EST PT 1/>: CPT | Mod: S$PBB,,, | Performed by: OPHTHALMOLOGY

## 2022-05-12 PROCEDURE — 99999 PR PBB SHADOW E&M-EST. PATIENT-LVL III: CPT | Mod: PBBFAC,,, | Performed by: OPHTHALMOLOGY

## 2022-05-12 PROCEDURE — 92134 POSTERIOR SEGMENT OCT RETINA (OCULAR COHERENCE TOMOGRAPHY)-BOTH EYES: ICD-10-PCS | Mod: 26,S$PBB,, | Performed by: OPHTHALMOLOGY

## 2022-05-12 NOTE — TELEPHONE ENCOUNTER
----- Message from Mallory Dormanne sent at 5/12/2022  4:31 PM CDT -----  Contact: pt at [phone]  Type:  Patient Returning Call    Who Called:  pt  Who Left Message for Patient:  Jacquelyn  Does the patient know what this is regarding?:  yes  Best Call Back Number:  [phone]  Additional Information:  pt is calling the office to get an appt scheduled. Please call back and advise.

## 2022-05-12 NOTE — TELEPHONE ENCOUNTER
----- Message from Suki Lagos sent at 5/12/2022  2:21 PM CDT -----  Please call pt for Low Vision  [facility]  Thanks

## 2022-05-17 ENCOUNTER — PES CALL (OUTPATIENT)
Dept: ADMINISTRATIVE | Facility: CLINIC | Age: 87
End: 2022-05-17
Payer: MEDICARE

## 2022-05-20 RX ORDER — LABETALOL HYDROCHLORIDE 5 MG/ML
INJECTION, SOLUTION INTRAVENOUS
Status: DISPENSED
Start: 2022-05-20 | End: 2022-05-21

## 2022-05-20 RX ORDER — PHENYLEPHRINE HCL IN 0.9% NACL 1 MG/10 ML
SYRINGE (ML) INTRAVENOUS
Status: DISPENSED
Start: 2022-05-20 | End: 2022-05-21

## 2022-05-23 ENCOUNTER — PATIENT MESSAGE (OUTPATIENT)
Dept: CARDIOLOGY | Facility: CLINIC | Age: 87
End: 2022-05-23
Payer: MEDICARE

## 2022-05-23 ENCOUNTER — PATIENT MESSAGE (OUTPATIENT)
Dept: ORTHOPEDICS | Facility: CLINIC | Age: 87
End: 2022-05-23
Payer: MEDICARE

## 2022-05-23 ENCOUNTER — PATIENT MESSAGE (OUTPATIENT)
Dept: SPINE | Facility: CLINIC | Age: 87
End: 2022-05-23
Payer: MEDICARE

## 2022-05-25 ENCOUNTER — IMMUNIZATION (OUTPATIENT)
Dept: INTERNAL MEDICINE | Facility: CLINIC | Age: 87
End: 2022-05-25
Payer: MEDICARE

## 2022-05-25 DIAGNOSIS — Z23 NEED FOR VACCINATION: Primary | ICD-10-CM

## 2022-05-25 PROCEDURE — 91305 COVID-19, MRNA, LNP-S, PF, 30 MCG/0.3 ML DOSE VACCINE (PFIZER): CPT | Mod: PBBFAC

## 2022-05-26 ENCOUNTER — OFFICE VISIT (OUTPATIENT)
Dept: SPINE | Facility: CLINIC | Age: 87
End: 2022-05-26
Payer: MEDICARE

## 2022-05-26 ENCOUNTER — TELEPHONE (OUTPATIENT)
Dept: NEUROLOGY | Facility: CLINIC | Age: 87
End: 2022-05-26
Payer: MEDICARE

## 2022-05-26 DIAGNOSIS — M48.00 SPINAL STENOSIS, UNSPECIFIED SPINAL REGION: Primary | ICD-10-CM

## 2022-05-26 DIAGNOSIS — M47.812 SPONDYLOSIS OF CERVICAL REGION WITHOUT MYELOPATHY OR RADICULOPATHY: ICD-10-CM

## 2022-05-26 DIAGNOSIS — M47.816 SPONDYLOSIS OF LUMBAR REGION WITHOUT MYELOPATHY OR RADICULOPATHY: ICD-10-CM

## 2022-05-26 PROCEDURE — 99213 OFFICE O/P EST LOW 20 MIN: CPT | Mod: 95,,, | Performed by: NURSE PRACTITIONER

## 2022-05-26 PROCEDURE — 99213 PR OFFICE/OUTPT VISIT, EST, LEVL III, 20-29 MIN: ICD-10-PCS | Mod: 95,,, | Performed by: NURSE PRACTITIONER

## 2022-05-26 RX ORDER — GABAPENTIN 300 MG/1
300 CAPSULE ORAL NIGHTLY
Qty: 90 CAPSULE | Refills: 2 | Status: SHIPPED | OUTPATIENT
Start: 2022-05-26 | End: 2022-12-09 | Stop reason: SDUPTHER

## 2022-05-26 RX ORDER — BACLOFEN 10 MG/1
5-10 TABLET ORAL 3 TIMES DAILY PRN
Qty: 90 TABLET | Refills: 2 | Status: SHIPPED | OUTPATIENT
Start: 2022-05-26 | End: 2022-09-15

## 2022-05-26 RX ORDER — GABAPENTIN 100 MG/1
100 CAPSULE ORAL 2 TIMES DAILY
Qty: 180 CAPSULE | Refills: 2 | Status: SHIPPED | OUTPATIENT
Start: 2022-05-26 | End: 2022-12-09 | Stop reason: SDUPTHER

## 2022-05-26 NOTE — PROGRESS NOTES
Subjective:      Patient ID: Selma Lux is a 86 y.o. female.    Chief Complaint: No chief complaint on file.    The patient location is: Home  The chief complaint leading to consultation is: Medication refill    Visit type: audio only    Face to Face time with patient:   20 minutes of total time spent on the encounter, which includes face to face time and non-face to face time preparing to see the patient (eg, review of tests), Obtaining and/or reviewing separately obtained history, Documenting clinical information in the electronic or other health record, Independently interpreting results (not separately reported) and communicating results to the patient/family/caregiver, or Care coordination (not separately reported).     Each patient to whom he or she provides medical services by telemedicine is:  (1) informed of the relationship between the physician and patient and the respective role of any other health care provider with respect to management of the patient; and (2) notified that he or she may decline to receive medical services by telemedicine and may withdraw from such care at any time.    Notes:     Interval History 5/26/2022:   Dr. Lux presents today for medication refills. Due to technical difficulty with video, we had to do audio only. She was last seen by Dr Thomson in May 2021. She missed her December visit due to being hospitalized for subdural hematome secondary to fall injury. She was discharged home at the end of February and has made a significant recovery. She continues to report relief with baclofen and gabapentin and is requesting refills today.     HPI:  Jose J is an 86 yo female here for follow up of her neck pain from 7-8 years that worsened in November 2017.  She was last seen by me on 1/29/2021 and she was sent back to pain management to consider injections.  She had BLOCK, NERVE, FEMORAL AND OBTURATOR (Left) on 4/19/2021.  She previously went for a left hip injection  11/22/2019 which did not help.  she was doing better and moving better with botox, and she had repeat 1/13/2020, and 7/29/2020, she is scheduled to follow up with neurology.    She had trigger point in SCM on 1/17/2018. She feels like the nerve block for the left hip was helpful.  She feels like the hip is better.  She has been still going to PT.  She feels like the pain is better over all.  She has been better doing the exercises.  She feels like walking well with the cane.  She was on walker more, so she has gotten better.  She feels like she has good and bad days with neck.  Her daughter really helps manage her and take care of the meds.  She has been moving more.  She is tired of PT for the moment.  She feels like the hip injection is still giving ehr 50% relief.  She is not taking the tramadol daily.  She was getting botox with neurology.  She has not followed up with neurology.  The neck and headache is better since er.  She had lidocaine patches and that helps.      MRI lumbar 3/13/2017  There is S-shaped scoliosis of the thoracolumbar spine, with dextroscoliosis of the thoracolumbar spine and levoscoliosis of the mid lumbar spine. The vertebral body heights are well maintained, with no fracture.  No marrow signal abnormality suspicious for an infiltrative process.  There is multilevel disc space height loss.     The conus is normal in appearance, and terminates at the L1 level.  There is a 2.2 cm perineural root sleeve cyst at the level of S3.      The adjacent soft tissue structures demonstrate presence of a 3.9 cm cyst within the right kidney.  There multiple uterine fibroids present.      There are findings of multi-level lumbar spondylosis, as below.    T12-L1: Left paracentral disc spur complex and bilateral facet arthropathy, resulting in mild spinal canal stenosis.  No significant neuroforaminal narrowing.    L1-L2: Asymmetric left-sided broad based disc bulge and bilateral facet arthropathy,  resulting in encroachment of the left lateral recess.  There is no significant spinal canal stenosis, and severe left-sided neuroforaminal narrowing.    L2-L3: Asymmetric right-sided broad-based disc bulge and facet arthropathy resulting in moderate spinal canal stenosis, and moderate/severe bilateral neuroforaminal narrowing.    L3-L4: Broad-based disc bulge and bilateral facet arthropathy resulting in moderate spinal canal stenosis, and moderate/severe  neuroforaminal narrowing.    L4-L5: Broad-based disc bulge and bilateral facet arthropathy, with ligamentous hypertrophy, resulting in severe spinal canal stenosis and severe right neuroforaminal narrowing.    L5-S1: Broad-based disc bulge and bilateral facet arthropathy, resulting in severe spinal canal stenosis and moderate left-sided neural foraminal narrowing.    Impression         Severe degenerative changes of the lumbar spine, noting severe spinal canal stenosis at L4-5 and L5-S1, as well as moderate spinal canal stenosis at L2-3 and L3-4.  Additional details as above.    S-shaped scoliosis of the thoracolumbar spine.    Perineural root sleeve cyst at the level of  S3.    Multiple uterine fibroids.    Simple right-sided renal cyst.      X-ray hips 2/2020  Bones appear somewhat demineralized.  Moderate to severe degenerative changes at both hips with joint space narrowing and hypertrophic spurring.  Degenerative changes also noted at the lower lumbar spine with hypertrophic spurring, disc space narrowing and probable vacuum phenomenon.  Lumbar levoscoliosis also noted.  Small rounded calcifications within the pelvis have the appearance of phleboliths.  Additional calcifications are seen within the mid pelvis likely representing calcified uterine fibroids.  If clinically indicated, ultrasound or MR examination may be considered for further evaluation.     Impression:     DJD, similar to prior exam.  Probable calcified uterine fibroids.         MRI cervical  3/2017  There is mild anterolisthesis C2 on C3 and mild retrolisthesis of C3 on C4.  There is also mild retrolisthesis of C6 on C7.  The spondylolisthesis is likely secondary to ligamentous laxity.  There is straightening of normal cervical lordosis. Vertebral body heights are well maintained without evidence of fracture or marrow signal abnormality suspicious for an infiltrative process.  There is loss of intervertebral disc space height at C5-6 and C6-7 Visualized posterior fossa, cervical cord, and upper thoracic cord demonstrate normal signal. Surrounding soft tissue structures demonstrate no significant abnormalities.      C2-3:  Posterior disc osteophyte complex and bilateral facet arthropathy.  Effacement of the anterior thecal sac, without flattening of the spinal cord.  No significant neural foraminal narrowing.     C3-4:  Posterior disc osteophyte complex and bilateral facet arthropathy, resulting in effacement of the anterior thecal sac, without flattening of the spinal cord.  There is moderate left-sided neuroforaminal narrowing.       C4-5:  Posterior disc osteophyte complex and bilateral facet arthropathy, resulting in effacement of the anterior thecal sac, without flattening of the spinal cord.  There is no significant neural foraminal narrowing.     C5-6:  Posterior disc osteophyte complex and bilateral facet arthropathy, resulting in effacement of the anterior thecal sac, without flattening of spinal cord.  No significant neural foraminal narrowing.     C6-7:  Posterior disc osteophyte complex and bilateral facet arthropathy, resulting in effacement of the anterior thecal sac, without flattening of the spinal cord.  No significant neural foraminal narrowing.     C7-T1:  No significant spinal canal stenosis.  No significant neural foraminal narrowing.  Impression         There is multilevel degenerative changes of the cervical spine, resulting in mild spinal canal stenosis and neuroforaminal  narrowing as detailed above.      X-ray cervical  AP, neutral lateral, flexion lateral, and extension lateral radiographs of the cervical spine were obtained.  Note that the cervicothoracic junction is not optimally visualized.  There is 2 mm anterolisthesis of C2 on C3 which increases to 3 mm on the flexion radiographs.  The remainder of the posterior vertebral alignment is satisfactory.  Vertebral body heights are well-maintained.  There are advanced degenerative changes identified throughout the cervical spine with disc space narrowing and anterior and posterior osteophyte formation.  There is solid bony fusion of the C5 and C6 vertebral bodies.  There is mild facet arthropathy noted throughout the cervical spine.  Atherosclerotic calcification is identified in the region of the carotid arteries bilaterally.  Impression         2 mm anterolisthesis of C2 on C3 which appears to be degenerative in etiology.  This increases to 3 mm on the flexion radiograph of the cervical spine.  Cervical spondylosis.  Atherosclerosis.    X-ray left shoulder 2015  Left shoulder: Significant acromiohumeral interval narrowing, this can be associated with a rotator cuff tear.  The humeral head demonstrate normal contour.  No osseous lesions, sclerosis or significant osteophyte formation.  The acromioclavicular joint   appears within normal limits.  The soft tissues appear normal.    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  No date: SALIVARY GLAND SURGERY  No date: TONSILLECTOMY    Review of patient's family history indicates:    Hypertension                   Mother                    Prostate cancer                Father                      Comment: prostate cancer    Breast cancer                  Maternal Grandmother      Lupus                          Neg Hx                    Psoriasis                      Neg Hx                    Melanoma                       Neg Hx                    Colon cancer                   Neg Hx                      Social History    Marital status:              Spouse name:                       Years of education:                 Number of children:               Social History Main Topics    Smoking status: Never Smoker                                                                Smokeless tobacco: Never Used                        Alcohol use: No                 Comment: very seldom     Drug use: No              Sexual activity: No                      Comment: ,  age 52,         Current Outpatient Prescriptions:  amlodipine (NORVASC) 5 MG tablet, TAKE 1 TABLET DAILY, Disp: 90 tablet, Rfl: 2  baclofen (LIORESAL) 10 MG tablet, Take 1 tablet (10 mg total) by mouth 3 (three) times daily., Disp: 270 tablet, Rfl: 2  clopidogrel (PLAVIX) 75 mg tablet, Take 1 tablet (75 mg total) by mouth once daily., Disp: 90 tablet, Rfl: 1  deutetrabenazine (AUSTEDO) 9 mg Tab, Take 2 tablets by mouth 2 (two) times daily. Final titrating dose - needs initially titration pack from company, Disp: 120 tablet, Rfl: 11  diazePAM (VALIUM) 2 MG tablet, Take 1 tablet (2 mg total) by mouth every evening., Disp: 30 tablet, Rfl: 3  ferrous sulfate 325 (65 FE) MG EC tablet, Take 325 mg by mouth once daily. , Disp: , Rfl:   gabapentin (NEURONTIN) 300 MG capsule, Take 1-2 capsules (300-600 mg total) by mouth 3 (three) times daily., Disp: 540 capsule,  Rfl: 1  hydrocodone-acetaminophen 5-325mg (NORCO) 5-325 mg per tablet, Take 1 tablet by mouth every 24 hours as needed for Pain., Disp: 30 tablet, Rfl: 0  hydroxychloroquine (PLAQUENIL) 200 mg tablet, TAKE 2 TABLETS ONCE DAILY, Disp: 180 tablet, Rfl: 1  linaclotide (LINZESS) 145 mcg Cap capsule, Take 1 capsule (145 mcg total) by mouth once daily., Disp: 30 capsule, Rfl: 0  memantine (NAMENDA XR) 7-14-21-28 mg C24k, Follow titration instructions 7 mg x1. 14 mg x1 week. 21 mg x1 week. 28 mg x1 week., Disp: 1 each, Rfl: 0  memantine 28 mg CSpX, Take 1 capsule (28 mg total) by mouth once daily. START after titration pack completed., Disp: 30 capsule, Rfl: 3  montelukast (SINGULAIR) 10 mg tablet, TAKE 1 TABLET EVERY EVENING, Disp: 90 tablet, Rfl: 2  omega 3-dha-epa-fish oil 250-350-1,000 mg Cap, Take 1 capsule by mouth 2 (two) times daily., Disp: 180 capsule, Rfl: 3  predniSONE (DELTASONE) 5 MG tablet, TAKE 2 TABLETS ONCE DAILY, Disp: 180 tablet, Rfl: 0  sertraline (ZOLOFT) 25 MG tablet, Take 1 tablet (25 mg total) by mouth once daily., Disp: 90 tablet, Rfl: 1  thiamine 100 MG tablet, Take 1 tablet (100 mg total) by mouth once daily., Disp: , Rfl:     No current facility-administered medications for this visit.       Review of patient's allergies indicates:  No Known Allergies        Review of Systems   Constitutional: Negative for weight gain and weight loss.   Cardiovascular: Negative for chest pain.   Respiratory: Negative for shortness of breath.    Musculoskeletal: Positive for joint pain (bilateral shoulder ), myalgias and neck pain. Negative for back pain and joint swelling.   Gastrointestinal: Negative for abdominal pain, bowel incontinence, nausea and vomiting.   Genitourinary: Negative for bladder incontinence and urgency.   Neurological: Positive for headaches. Negative for numbness and paresthesias.         Objective:        General: Selma is well-developed, well-nourished, in no acute distress, alert and  oriented to time, place and person.             Assessment:       1. Spinal stenosis, unspecified spinal region    2. Spondylosis of lumbar region without myelopathy or radiculopathy    3. Spondylosis of cervical region without myelopathy or radiculopathy           Plan:          1.  Prior records were reviewed today  2. She was last seen by Dr Thomson in May 2021. She missed her December visit due to being hospitalized for subdural hematoma secondary to fall injury. She was discharged home at the end of February and has made a significant recovery.   3. She continues to report relief with baclofen and gabapentin and is requesting refills today.   4.  Continue Baclofen 5-10mg po TID,   5.  Continue gabapentin 300 mg po QHs, she has also been taking 100mg 1-2x times per day; Rx also given for gabapentin 100mg BID during the day and 300mg QHS  6.  RTC for followup in 3 months with Dr. Thomson      Follow-up: Follow up in about 3 months (around 8/26/2022).  If there are any questions prior to this, the patient was instructed to contact the office.

## 2022-05-26 NOTE — TELEPHONE ENCOUNTER
Selma Lux (Sotomayor: [id number])  Nurtec 75MG dispersible tablets     Form   Electronic PA Form (2017 [employer])    CaseId:[id number];Status:Approved;Review Type:Prior Auth;Coverage Start Date:04/24/2022;Coverage End Date:12/31/2099;

## 2022-05-30 ENCOUNTER — TELEPHONE (OUTPATIENT)
Dept: OPHTHALMOLOGY | Facility: CLINIC | Age: 87
End: 2022-05-30
Payer: MEDICARE

## 2022-05-30 NOTE — TELEPHONE ENCOUNTER
----- Message from Cindy Pena sent at 5/30/2022 11:32 AM CDT -----  Contact: [phone]  Type: Needs Medical Advice  Who Called:  Pts daughter     Best Call Back Number: [phone]    Additional Information: Pt calling to reschedule her appt for today to a later date. Next avail was 7/1. Pt would like to be sooner than that if possible. Pls call back and advise

## 2022-05-30 NOTE — TELEPHONE ENCOUNTER
----- Message from Cheryle Quintana sent at 5/30/2022 11:44 AM CDT -----  LM for pt to return call asap to reschedule to 7/1. And let her know this appt is in [place].

## 2022-05-30 NOTE — TELEPHONE ENCOUNTER
Left message for pt to return call concerning rescheduling her appt with Dr Mckeon. I let her know that we needed to have 2 appts scheduled for a low vision visit and the next available is on 7/1 but she needs to call back as soon as possible in order to book those appt before they are gone.

## 2022-05-31 ENCOUNTER — EXTERNAL CHRONIC CARE MANAGEMENT (OUTPATIENT)
Dept: PRIMARY CARE CLINIC | Facility: CLINIC | Age: 87
End: 2022-05-31
Payer: MEDICARE

## 2022-05-31 PROCEDURE — 99490 CHRNC CARE MGMT STAFF 1ST 20: CPT | Mod: PBBFAC | Performed by: FAMILY MEDICINE

## 2022-05-31 PROCEDURE — 99490 CHRNC CARE MGMT STAFF 1ST 20: CPT | Mod: S$PBB,,, | Performed by: FAMILY MEDICINE

## 2022-05-31 PROCEDURE — 99490 PR CHRONIC CARE MGMT, 1ST 20 MIN: ICD-10-PCS | Mod: S$PBB,,, | Performed by: FAMILY MEDICINE

## 2022-06-02 ENCOUNTER — OFFICE VISIT (OUTPATIENT)
Dept: ORTHOPEDICS | Facility: CLINIC | Age: 87
End: 2022-06-02
Payer: MEDICARE

## 2022-06-02 DIAGNOSIS — M19.019 SHOULDER ARTHRITIS: Primary | ICD-10-CM

## 2022-06-02 PROCEDURE — 99214 OFFICE O/P EST MOD 30 MIN: CPT | Mod: 95,,, | Performed by: ORTHOPAEDIC SURGERY

## 2022-06-02 PROCEDURE — 99214 PR OFFICE/OUTPT VISIT, EST, LEVL IV, 30-39 MIN: ICD-10-PCS | Mod: 95,,, | Performed by: ORTHOPAEDIC SURGERY

## 2022-06-02 NOTE — PROGRESS NOTES
I have personally taken the history and examined the patient. I agree with the Hand Surgery PA's note. The plan will be   ; patient does not want surgery she actually is still recovering from subdermal hematoma she also had COVID she was in rehab she is wheelchair-bound she continues to have shoulder pain but after discussing shoulder surgery again she is adamant she does not want shoulder surgery she is interested in injection her daughter will set up an appointment with Teri to have a shoulder injection patient is also very concerned about a change of vascularity over her chest on the right side suggested that she does bring this up with her primary care her daughter stated stated that she would make that appointment for it was very difficult to see on her can run sometimes listening was difficult her connection would go in and out but on some of the view which she was describing on her chest do feel that she needs follow-up with this

## 2022-06-02 NOTE — PROGRESS NOTES
The patient location is: [place]  The chief complaint leading to consultation is: shoulder pain    Visit type: audiovisual    Face to Face time with patient: 20  30 minutes of total time spent on the encounter, which includes face to face time and non-face to face time preparing to see the patient (eg, review of tests), Obtaining and/or reviewing separately obtained history, Documenting clinical information in the electronic or other health record, Independently interpreting results (not separately reported) and communicating results to the patient/family/caregiver, or Care coordination (not separately reported).     Each patient to whom he or she provides medical services by telemedicine is:  (1) informed of the relationship between the physician and patient and the respective role of any other health care provider with respect to management of the patient; and (2) notified that he or she may decline to receive medical services by telemedicine and may withdraw from such care at any time.      Subjective:      Patient ID: Selma Lux is a 86 y.o. female.    Chief Complaint: No chief complaint on file.      HPI  Selma Lux is a 86 y.o. female presenting today for follow up shoulder arthritis. She has known bilateral shoulder arthritis with intermittent pain in the shoulders. R>L. She has previously received steroid injections with temporary pain relief. Last injections 10/2021. She was recently hospitalized and reports increased shoulder pain beginning around this time. In addition she reports a vein and edema over the anterior right shoulder/ chest region.      Review of patient's allergies indicates:  No Known Allergies      Current Outpatient Medications   Medication Sig Dispense Refill    acetaminophen (TYLENOL) 500 MG tablet Take 1 tablet (500 mg total) by mouth every 4 (four) hours as needed for Pain. 60 tablet 0    albuterol (PROVENTIL/VENTOLIN HFA) 90 mcg/actuation inhaler Inhale 2  puffs into the lungs every 6 (six) hours as needed for Wheezing. Rescue 18 g 3    albuterol-ipratropium (DUO-NEB) 2.5 mg-0.5 mg/3 mL nebulizer solution Take 3 mLs by nebulization every 4 (four) hours as needed for Wheezing. Rescue 75 mL 0    amLODIPine (NORVASC) 10 MG tablet Take 10 mg by mouth once daily.      amLODIPine (NORVASC) 10 MG tablet TAKE 1 TABLET(10 MG) BY MOUTH EVERY DAY 90 tablet 0    atorvastatin (LIPITOR) 40 MG tablet Take 1 tablet (40 mg total) by mouth once daily. 90 tablet 3    atorvastatin (LIPITOR) 40 MG tablet Take 40 mg by mouth once daily.      baclofen (LIORESAL) 10 MG tablet Take 0.5-1 tablets (5-10 mg total) by mouth 3 (three) times daily as needed. 90 tablet 2    bisacodyL (DULCOLAX) 10 mg Supp Place 1 suppository (10 mg total) rectally daily as needed (constipation). 30 suppository 0    calcium carbonate (TUMS) 200 mg calcium (500 mg) chewable tablet Take 2 tablets (1,000 mg total) by mouth once daily. (Patient not taking: Reported on 5/12/2022) 60 tablet 11    ELIQUIS 5 mg Tab Take 5 mg by mouth 2 (two) times daily.      ELIQUIS 5 mg Tab TAKE 1 TABLET TWICE A  tablet 3    famotidine (PEPCID) 20 MG tablet Take 1 tablet (20 mg total) by mouth 2 (two) times daily. 60 tablet 11    furosemide (LASIX) 40 MG tablet Take 1 tablet (40 mg total) by mouth every other day. TAKE 1 TABLET(40 MG) BY MOUTH DAILY 90 tablet 3    gabapentin (NEURONTIN) 100 MG capsule Take 1 capsule (100 mg total) by mouth 2 (two) times daily. Take one 100mg capsule twice a day and one 300mg capsule at bedtime 180 capsule 2    gabapentin (NEURONTIN) 300 MG capsule Take 1 capsule (300 mg total) by mouth every evening. 90 capsule 2    hydrOXYchloroQUINE (PLAQUENIL) 200 mg tablet Take 1 tablet (200 mg total) by mouth 2 (two) times daily. 180 tablet 11    lacosamide (VIMPAT) 10 mg/mL Soln oral solution Take 10 mLs (100 mg total) by mouth every 12 (twelve) hours. 600 mL 11    LIDOcaine (LIDODERM) 5 %  Place 1 patch onto the skin once daily. Remove & Discard patch within 12 hours or as directed by MD 30 patch 2    magnesium oxide (MAG-OX) 400 mg (241.3 mg magnesium) tablet Take 400 mg by mouth once daily.      melatonin (MELATIN) 3 mg tablet Take 2 tablets (6 mg total) by mouth nightly as needed for Insomnia.  0    melatonin 12 mg Tab Take 9 mg by mouth nightly. 30 tablet 2    montelukast (SINGULAIR) 10 mg tablet Take 1 tablet (10 mg total) by mouth every evening. 90 tablet 0    montelukast (SINGULAIR) 10 mg tablet Take 10 mg by mouth once daily.      mycophenolate (CELLCEPT) 500 mg Tab TAKE 1 TABLET TWICE A  tablet 3    mycophenolate mofetil (CELLCEPT) 200 mg/mL SusR Take 2.5 mLs (500 mg total) by mouth 2 (two) times daily. 150 mL 11    omeprazole (PRILOSEC) 20 MG capsule Take 1 capsule (20 mg total) by mouth once daily. 90 capsule 3    polyethylene glycol (GLYCOLAX) 17 gram PwPk Take 17 g by mouth daily as needed.  0    predniSONE (DELTASONE) 10 MG tablet Take 1 tablet (10 mg total) by mouth once daily. 30 tablet 2    predniSONE (DELTASONE) 5 MG tablet Take 1 tablet (5 mg total) by mouth once daily. 30 tablet 2    simethicone (MYLICON) 80 MG chewable tablet Take 1 tablet (80 mg total) by mouth 3 (three) times daily as needed for Flatulence. 30 tablet 0    thiamine 100 MG tablet Take 100 mg by mouth once daily.      traMADoL (ULTRAM) 50 mg tablet Take 1 tablet (50 mg total) by mouth every 6 (six) hours as needed for Pain. 90 tablet 0    vitamin D (VITAMIN D3) 1000 units Tab Take 2 tablets (2,000 Units total) by mouth once daily. (Patient not taking: Reported on 5/12/2022) 30 tablet 2     Current Facility-Administered Medications   Medication Dose Route Frequency Provider Last Rate Last Admin    onabotulinumtoxina injection 200 Units  200 Units Intramuscular Q90 Days Baldomero Giang MD   200 Units at 07/29/20 5015       Past Medical History:   Diagnosis Date    Acute cystitis without  hematuria 2/27/2020    Acute hypoxemic respiratory failure 4/11/2018    Acute respiratory failure with hypoxia 3/2/2020    Altered mental status     Anemia     Arthritis     Bilateral hand pain 3/14/2018    Branch retinal vein occlusion, left eye 2/20/2015    Chronic bilateral low back pain without sciatica 3/23/2017    Chronic renal failure in pediatric patient, stage 3 (moderate) 4/15/2018    Cognitive communication deficit 12/19/2017    Cystoid macular edema, left eye 2/20/2015    Cystoid macular edema, left eye 2/20/2015    Delirium 12/30/2021    DJD (degenerative joint disease) of cervical spine 5/15/2013    Enterococcal bacteremia     Fatty liver 8/26/2014    Goiter 4/9/2018    Hashimoto's disease     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    Hypertension     Hypertensive encephalopathy     IBS (irritable bowel syndrome) 6/21/2017    IGT (impaired glucose tolerance) 1/12/2016    Iron deficiency anemia secondary to inadequate dietary iron intake 6/24/2013    Joint pain     Keratoconjunctivitis sicca of both eyes not specified as Sjogren's 7/29/2016    Leiomyoma of uterus, unspecified 9/16/2014    Long QT interval 6/29/2016    Due to medication (plaquenil)     Macular edema 1/12/2015    Multinodular goiter 1/12/2016    Neuropathy 8/23/2017    Plaquenil causing adverse effect in therapeutic use 10/7/2016    Pneumococcal vaccine refused 8/17/2016    Pneumonia due to Streptococcus pneumoniae 4/5/2018    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/2015    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    s/p Right Total knee replacement 5/15/2013    Scoliosis of thoracic spine 5/15/2013    Small vessel disease, cerebrovascular 12/28/2017    Stroke     UTI (urinary tract infection) 12/29/2018    Vascular dementia 12/6/2017       Past Surgical History:   Procedure Laterality Date    BREAST CYST EXCISION      CATARACT EXTRACTION      COLONOSCOPY N/A 9/29/2015    Procedure:  COLONOSCOPY;  Surgeon: FIDELINA Sanchez MD;  Location: [facility] ENDO (4TH FLR);  Service: Endoscopy;  Laterality: N/A;    EYE SURGERY      INJECTION OF ANESTHETIC AGENT AROUND NERVE Left 4/19/2021    Procedure: BLOCK, NERVE, FEMORAL AND OBTURATOR;  Surgeon: Shivam Gonzalez MD;  Location: [facility] PAIN MGT;  Service: Pain Management;  Laterality: Left;  consent needed    JOINT REPLACEMENT      right knee    KNEE SURGERY Left 12/31/2013    TKR    left parotidectomy      mixed tumor    UT EVAL,SWALLOW FUNCTION,CINE/VIDEO RECORD  6/5/2021         SALIVARY GLAND SURGERY      TONSILLECTOMY      UPPER GASTROINTESTINAL ENDOSCOPY         Review of Systems:  Constitutional: Negative for chills and fever.   Respiratory: Negative for cough and shortness of breath.    Gastrointestinal: Negative for nausea and vomiting.   Skin: Negative for rash.   Neurological: Negative for dizziness and headaches.   Psychiatric/Behavioral: Negative for depression.   MSK as in HPI       OBJECTIVE:     PHYSICAL EXAM:  LMP  (LMP Unknown)     GEN:  NAD, well-developed, well-groomed.  NEURO: Awake, alert, and oriented. Normal attention and concentration.    PSYCH: Normal mood and affect. Behavior is normal.  CARDIOVASCULAR: Radial pulses 2+ bilaterally. No LE edema noted.  PULMONARY: Breath sounds normal. No respiratory distress.  SKIN: Intact, no rashes.        RADIOGRAPHS:  Xray right shoulder 7/14/21   Impression:   Findings compatible with severe glenohumeral osteoarthritis as well as chronic rotator cuff tear.    Xray left shoulder 7/14/21   Impression:   Advanced, stable glenohumeral osteoarthritis and findings compatible with stable chronic rotator cuff tear.    Comments: I have personally reviewed the imaging and I agree with the above radiologist's report.    ASSESSMENT/PLAN:       ICD-10-CM ICD-9-CM   1. Shoulder arthritis  M19.019 716.91     Plan:   Treatment options discussed with pt including conservative and surgical options. She is not  interested in surgery at this time. She would like to return to clinic for repeat injection. Recommend she discuss chest edema/ vein with PCP may need breast examination.         The patient indicates understanding of these issues and agrees to the plan.    Jodee Perea PA-C  [facility] [place]

## 2022-06-06 ENCOUNTER — TELEPHONE (OUTPATIENT)
Dept: INTERNAL MEDICINE | Facility: CLINIC | Age: 87
End: 2022-06-06
Payer: MEDICARE

## 2022-06-06 ENCOUNTER — PATIENT MESSAGE (OUTPATIENT)
Dept: INTERNAL MEDICINE | Facility: CLINIC | Age: 87
End: 2022-06-06
Payer: MEDICARE

## 2022-06-06 DIAGNOSIS — Z12.31 ENCOUNTER FOR SCREENING MAMMOGRAM FOR MALIGNANT NEOPLASM OF BREAST: ICD-10-CM

## 2022-06-06 DIAGNOSIS — N64.4 PAIN OF BOTH BREASTS: Primary | ICD-10-CM

## 2022-06-06 NOTE — TELEPHONE ENCOUNTER
Please call patient daughter and schedule referral to [facility] and also diagnostic mammogram, thank you

## 2022-06-06 NOTE — TELEPHONE ENCOUNTER
Called and spoke to daughter Rosy.Soonest appt was given for September. Set appt and put patient on wait list.

## 2022-06-10 ENCOUNTER — OFFICE VISIT (OUTPATIENT)
Dept: RHEUMATOLOGY | Facility: CLINIC | Age: 87
End: 2022-06-10
Payer: MEDICARE

## 2022-06-10 DIAGNOSIS — J84.116 CRYPTOGENIC ORGANIZING PNEUMONIA: ICD-10-CM

## 2022-06-10 DIAGNOSIS — M05.79 SEROPOSITIVE RHEUMATOID ARTHRITIS OF MULTIPLE SITES: Primary | ICD-10-CM

## 2022-06-10 DIAGNOSIS — S06.5XAA SDH (SUBDURAL HEMATOMA): ICD-10-CM

## 2022-06-10 DIAGNOSIS — M16.4 POST-TRAUMATIC OSTEOARTHRITIS OF BOTH HIPS: ICD-10-CM

## 2022-06-10 PROCEDURE — 99214 OFFICE O/P EST MOD 30 MIN: CPT | Mod: 95,,, | Performed by: INTERNAL MEDICINE

## 2022-06-10 PROCEDURE — 99214 PR OFFICE/OUTPT VISIT, EST, LEVL IV, 30-39 MIN: ICD-10-PCS | Mod: 95,,, | Performed by: INTERNAL MEDICINE

## 2022-06-10 NOTE — PROGRESS NOTES
Subjective:       Patient ID: Selma Lux is a 86 y.o. female.    Chief Complaint: Disease Management    HPI     The patient location is: Home [place]  The chief complaint leading to consultation is: RA mgt    Visit type: audiovisual    Face to Face time with patient: 8:32 - 8:55  30 minutes of total time spent on the encounter, which includes face to face time and non-face to face time preparing to see the patient (eg, review of tests), Obtaining and/or reviewing separately obtained history, Documenting clinical information in the electronic or other health record, Independently interpreting results (not separately reported) and communicating results to the patient/family/caregiver, or Care coordination (not separately reported).         Each patient to whom he or she provides medical services by telemedicine is:  (1) informed of the relationship between the physician and patient and the respective role of any other health care provider with respect to management of the patient; and (2) notified that he or she may decline to receive medical services by telemedicine and may withdraw from such care at any time.    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 there 50 years)     RA, mild RF (44, 29) negative CCP; since 2012   TKP L 2009 and  R 2014  CTS bilaterally surgery around 2011 2012 saw Dr No who recommended MTX ; she was afraid  Then saw Dr Qursehi; he Rx  plus prednisone 5 mg/d    April 2018 one injection of Humira and 2 weeks later admitted to ICU with resp infection; spent a month in ICU, then weeks in rehab; and home  end of June; dx cryptogenic organizing pneumonia; prednisone since      Stroke summer 2017  Viral meningitis with 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 2018  Hosp 8/26 with encephalopathy; workup non diagnostic; metabolic encephalopathy vs toxic  March 2020 pneumonia    Today :   Still on MMF,  bid, and pred  10  Gabapentin 100 prn and 300 at qpm; not sure it helps much with pain    Hospitalized 1/23-2/8/22 then rehab for 3 weeks; COVID with resp failure; now back to resp baseline  Has been home from hospital for >3 months   Says she can function but is still home bound  Uses wheelchair at home; can stand and transfer but does not feel she can walk without assistance  Had PT and daughter says has improved but requests further PT     Says fingers are gone and shows me DIP flexion contractures  Breathing is better now; has not used pulse ox in a while  Eating well now after no appetite when first home from hosp/rehab    Review of Systems   Constitutional: Negative for fever and unexpected weight change.   HENT: Negative for mouth sores and trouble swallowing.    Eyes: Negative for redness.   Respiratory: Negative for cough and shortness of breath.    Cardiovascular: Negative for chest pain.   Gastrointestinal: Negative for constipation and diarrhea.   Genitourinary: Negative for dysuria and genital sores.   Skin: Negative for rash.   Neurological: Negative for headaches.   Hematological: Does not bruise/bleed easily.       Rapid3 Question Responses and Scores 6/10/2022   MDHAQ Score 1.5   Psychologic Score 3.3   Pain Score 7   When you awakened in the morning OVER THE LAST WEEK, did you feel stiff? Yes   If Yes, please indicate the number of hours until you are as limber as you will be for the day 1   Fatigue Score 1.5   Global Health Score 6.5   RAPID3 Score 6.17          Objective:   LMP  (LMP Unknown)      Physical Exam      Assessment:       1. Seropositive rheumatoid arthritis of multiple sites    2. SDH (subdural hematoma)    3. Cryptogenic organizing pneumonia    4. Post-traumatic osteoarthritis of both hips            Plan:       Problem List Items Addressed This Visit        Active Problems    SDH (subdural hematoma)    Seropositive rheumatoid arthritis of multiple sites - Primary     Hard to say if RA is active  on video visit but overall sounds very stable on combination of HCQ, MMF and still prednisone 10 mg/d    Will see Dr Francis in a few weeks and he can taper prednisone if appropriate for her chronic lung disease    I will order labs to be drawn when comes in for Dr Giang next week  Can RTC me in 6 mo            Relevant Orders    Comprehensive Metabolic Panel    CBC Auto Differential    Sedimentation rate    C-Reactive Protein    Cryptogenic organizing pneumonia     Survived acute COVID infection Feb 2022    Sounds like she is stable on MMF and prednisone    Has appt with Dr Francis in July; will defer titration of MMF and/or prednisone to him as articular RA is very stable           Post-traumatic osteoarthritis of both hips     Limited mobility due to this and she will not be a surgical candidate

## 2022-06-10 NOTE — ASSESSMENT & PLAN NOTE
Survived acute COVID infection Feb 2022    Sounds like she is stable on MMF and prednisone    Has appt with Dr Francis in July; will defer titration of MMF and/or prednisone to him as articular RA is very stable

## 2022-06-10 NOTE — ASSESSMENT & PLAN NOTE
Hard to say if RA is active on video visit but overall sounds very stable on combination of HCQ, MMF and still prednisone 10 mg/d    Will see Dr Francis in a few weeks and he can taper prednisone if appropriate for her chronic lung disease    I will order labs to be drawn when comes in for Dr Giang next week  Can RTC me in 6 mo

## 2022-06-14 NOTE — ED NOTES
I left a message to the MSW for [facility] for updates regarding discharge date/planning for the pt. Awaiting response at this time.   Tele box delivered. Will put transport in for pt.

## 2022-06-15 ENCOUNTER — OFFICE VISIT (OUTPATIENT)
Dept: NEUROLOGY | Facility: CLINIC | Age: 87
End: 2022-06-15
Payer: MEDICARE

## 2022-06-15 VITALS
SYSTOLIC BLOOD PRESSURE: 119 MMHG | HEART RATE: 93 BPM | DIASTOLIC BLOOD PRESSURE: 72 MMHG | BODY MASS INDEX: 25.92 KG/M2 | HEIGHT: 64 IN

## 2022-06-15 DIAGNOSIS — G25.5 CHOREA: ICD-10-CM

## 2022-06-15 DIAGNOSIS — G24.3 CERVICAL DYSTONIA: Primary | ICD-10-CM

## 2022-06-15 DIAGNOSIS — Z71.89 COUNSELING REGARDING GOALS OF CARE: ICD-10-CM

## 2022-06-15 DIAGNOSIS — G44.86 CERVICOGENIC HEADACHE: ICD-10-CM

## 2022-06-15 DIAGNOSIS — G43.719 INTRACTABLE CHRONIC MIGRAINE WITHOUT AURA AND WITHOUT STATUS MIGRAINOSUS: ICD-10-CM

## 2022-06-15 PROCEDURE — 99214 OFFICE O/P EST MOD 30 MIN: CPT | Mod: S$PBB,,, | Performed by: PSYCHIATRY & NEUROLOGY

## 2022-06-15 PROCEDURE — 99212 OFFICE O/P EST SF 10 MIN: CPT | Mod: PBBFAC | Performed by: PSYCHIATRY & NEUROLOGY

## 2022-06-15 PROCEDURE — 99999 PR PBB SHADOW E&M-EST. PATIENT-LVL II: ICD-10-PCS | Mod: PBBFAC,,, | Performed by: PSYCHIATRY & NEUROLOGY

## 2022-06-15 PROCEDURE — 99214 PR OFFICE/OUTPT VISIT, EST, LEVL IV, 30-39 MIN: ICD-10-PCS | Mod: S$PBB,,, | Performed by: PSYCHIATRY & NEUROLOGY

## 2022-06-15 PROCEDURE — 99999 PR PBB SHADOW E&M-EST. PATIENT-LVL II: CPT | Mod: PBBFAC,,, | Performed by: PSYCHIATRY & NEUROLOGY

## 2022-06-15 NOTE — PROGRESS NOTES
"    Neurology Clinic  Follow-up Visit     Patient Name: Selma Lux MD  MRN: [id number]      CC: Hemichorea    Interval History:  - she was hospitalized for 3 months after falling, had subdural hematoma   - accompanied by daughters   - saw Dr. 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 female w/ PMH significant for HTN, Hashimoto's, presenting as follow up for chorea.  We've been seeing her in clinic for several years.  Her initial presentation seemed predominantly hemichoreatic, and her CNS vascular disease included lesions about the R caudate head as a possible origin.         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  Reviewed today:    - no family history  - botox for neck dystonia was not helpful  - "migraine" headaches that she has "had for life" are her biggest complaint  - not interested in more testing for chorea, including HD  - not interested in more medicine for chorea    Review of Systems:  Constitutional: no fever or chills  Eyes: no visual changes  ENT: no nasal congestion or sore throat  Respiratory: no cough or shortness of breath  Cardiovascular: no chest pain or palpitations  Gastrointestinal: no nausea or vomiting, no abdominal pain or change in bowel habits, positive for constipation  Genitourinary: no hematuria or dysuria  Integument/Breast: no rash or pruritis  Neurological: positive for hemichorea  Behavioral/Psych: no auditory or visual hallucinations      Physical Exam    Vitals:    06/15/22 1628   BP: 119/72   BP Location: Left arm   Patient Position: Sitting   Pulse: 93   Height: 5' 4" (1.626 m)     Limited exam:  - mild breathlessness, mild " hypophonia  - generalized chroea of the face, OMD, trunk and limbs     Lab and Test Results   Results for DR. MARY HERNANDEZ (MRN [id number]) as of 9/30/2021 17:58   Ref. Range 5/20/2021 08:44   WBC Latest Ref Range: 3.9 - 12.7 K/uL 8.26   RBC Latest Ref Range: 4.00 - 5.40 M/uL 4.22   Hemoglobin Latest Ref Range: 12.0 - 16.0 g/dL 11.7 (L)   Hematocrit Latest Ref Range: 37.0 - 48.5 % 37.2   MCV Latest Ref Range: 82.0 - 98.0 fL 88   MCH Latest Ref Range: 27.0 - 31.0 pg 27.7   MCHC Latest Ref Range: 32.0 - 36.0 g/dL 31.5 (L)   RDW Latest Ref Range: 11.5 - 14.5 % 15.8 (H)   Platelets Latest Ref Range: 150 - 450 K/uL 170   MPV Latest Ref Range: 9.2 - 12.9 fL 10.8   Gran % Latest Ref Range: 38.0 - 73.0 % 47.2   Lymph % Latest Ref Range: 18.0 - 48.0 % 28.7   Mono % Latest Ref Range: 4.0 - 15.0 % 21.5 (H)   Eosinophil % Latest Ref Range: 0.0 - 8.0 % 1.7   Basophil % Latest Ref Range: 0.0 - 1.9 % 0.4   Immature Granulocytes Latest Ref Range: 0.0 - 0.5 % 0.5   Gran # (ANC) Latest Ref Range: 1.8 - 7.7 K/uL 3.9   Lymph # Latest Ref Range: 1.0 - 4.8 K/uL 2.4   Mono # Latest Ref Range: 0.3 - 1.0 K/uL 1.8 (H)   Eos # Latest Ref Range: 0.0 - 0.5 K/uL 0.1   Baso # Latest Ref Range: 0.00 - 0.20 K/uL 0.03   Immature Grans (Abs) Latest Ref Range: 0.00 - 0.04 K/uL 0.04   nRBC Latest Ref Range: 0 /100 WBC 0   Differential Method Unknown Automated   Sodium Latest Ref Range: 136 - 145 mmol/L 131 (L)   Potassium Latest Ref Range: 3.5 - 5.1 mmol/L 4.0   Chloride Latest Ref Range: 95 - 110 mmol/L 96   CO2 Latest Ref Range: 23 - 29 mmol/L 26   Anion Gap Latest Ref Range: 8 - 16 mmol/L 9   BUN Latest Ref Range: 8 - 23 mg/dL 14   Creatinine Latest Ref Range: 0.5 - 1.4 mg/dL 1.2   eGFR if non African American Latest Ref Range: >60 mL/min/1.73 m^2 41.9 (A)   eGFR if African American Latest Ref Range: >60 mL/min/1.73 m^2 48.3 (A)   Glucose Latest Ref Range: 70 - 110 mg/dL 135 (H)   Calcium Latest Ref Range: 8.7 - 10.5 mg/dL 9.0   Alkaline  Phosphatase Latest Ref Range: 55 - 135 U/L 59   PROTEIN TOTAL Latest Ref Range: 6.0 - 8.4 g/dL 6.1   Albumin Latest Ref Range: 3.5 - 5.2 g/dL 3.6   BILIRUBIN TOTAL Latest Ref Range: 0.1 - 1.0 mg/dL 1.2 (H)   AST Latest Ref Range: 10 - 40 U/L 10   ALT Latest Ref Range: 10 - 44 U/L 5 (L)        Images:  No new perinent imaging, previous above    Assessment and Plan  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  - paraneoplastic workup negative several years ago  - declines HD and other genetic workup  - discussed confluence with migraine/neuralgia --> query systemic vasculopathy, does have a history of RA and Hashimoto's  - does not appear to be c/w CADASIL or CARASIL or similar on imaging    2) Headache.  Following with Dr. Ac     3. Cervicalgia   - may be sequelae of headaches  - compounded cream helped in the past, will resend/fax to [facility] --          F/u with [facility] in 6 months -1 year       Collaborating Physician, Dr. Giang, was available during today's encounter. Any change to plan along with cosign to appear in the EMR.       I spent 35 minutes with the patient, reviewing past encounters, labs and imaging.        JOSEPHINE [facility]ribe[facility]  Department of Neurology  Movement Disorders

## 2022-06-19 ENCOUNTER — HOSPITAL ENCOUNTER (INPATIENT)
Facility: HOSPITAL | Age: 87
LOS: 4 days | Discharge: NURSING, REHAB OR LONG-TERM CARE | DRG: 065 | End: 2022-06-23
Attending: EMERGENCY MEDICINE | Admitting: PSYCHIATRY & NEUROLOGY
Payer: MEDICARE

## 2022-06-19 DIAGNOSIS — R54 AGE-RELATED PHYSICAL DEBILITY: ICD-10-CM

## 2022-06-19 DIAGNOSIS — R53.1 GENERALIZED WEAKNESS: ICD-10-CM

## 2022-06-19 DIAGNOSIS — I63.9 STROKE: Primary | ICD-10-CM

## 2022-06-19 DIAGNOSIS — Z91.89 AT RISK FOR LONG QT SYNDROME: ICD-10-CM

## 2022-06-19 DIAGNOSIS — I63.311 THROMBOTIC STROKE INVOLVING RIGHT MIDDLE CEREBRAL ARTERY: ICD-10-CM

## 2022-06-19 DIAGNOSIS — N17.9 AKI (ACUTE KIDNEY INJURY): ICD-10-CM

## 2022-06-19 DIAGNOSIS — I63.311 CEREBRAL INFARCTION DUE TO THROMBOSIS OF RIGHT MIDDLE CEREBRAL ARTERY: ICD-10-CM

## 2022-06-19 PROBLEM — I63.511 STROKE DUE TO OCCLUSION OF RIGHT MIDDLE CEREBRAL ARTERY: Status: ACTIVE | Noted: 2022-06-19

## 2022-06-19 LAB
ALBUMIN SERPL BCP-MCNC: 4.2 G/DL (ref 3.5–5.2)
ALP SERPL-CCNC: 72 U/L (ref 55–135)
ALT SERPL W/O P-5'-P-CCNC: 11 U/L (ref 10–44)
ANION GAP SERPL CALC-SCNC: 15 MMOL/L (ref 8–16)
AST SERPL-CCNC: 14 U/L (ref 10–40)
BASOPHILS # BLD AUTO: 0.01 K/UL (ref 0–0.2)
BASOPHILS NFR BLD: 0.1 % (ref 0–1.9)
BILIRUB SERPL-MCNC: 1.2 MG/DL (ref 0.1–1)
BUN SERPL-MCNC: 17 MG/DL (ref 8–23)
BUN SERPL-MCNC: 19 MG/DL (ref 6–30)
CALCIUM SERPL-MCNC: 9.5 MG/DL (ref 8.7–10.5)
CHLORIDE SERPL-SCNC: 97 MMOL/L (ref 95–110)
CHLORIDE SERPL-SCNC: 99 MMOL/L (ref 95–110)
CHOLEST SERPL-MCNC: 120 MG/DL (ref 120–199)
CHOLEST/HDLC SERPL: 1.8 {RATIO} (ref 2–5)
CO2 SERPL-SCNC: 23 MMOL/L (ref 23–29)
CREAT SERPL-MCNC: 1.3 MG/DL (ref 0.5–1.4)
CREAT SERPL-MCNC: 1.4 MG/DL (ref 0.5–1.4)
DIFFERENTIAL METHOD: ABNORMAL
EOSINOPHIL # BLD AUTO: 0.2 K/UL (ref 0–0.5)
EOSINOPHIL NFR BLD: 2.1 % (ref 0–8)
ERYTHROCYTE [DISTWIDTH] IN BLOOD BY AUTOMATED COUNT: 17.3 % (ref 11.5–14.5)
EST. GFR  (AFRICAN AMERICAN): 43.5 ML/MIN/1.73 M^2
EST. GFR  (NON AFRICAN AMERICAN): 37.8 ML/MIN/1.73 M^2
GLUCOSE SERPL-MCNC: 128 MG/DL (ref 70–110)
GLUCOSE SERPL-MCNC: 163 MG/DL (ref 70–110)
HCT VFR BLD AUTO: 35.9 % (ref 37–48.5)
HCT VFR BLD CALC: 36 %PCV (ref 36–54)
HDLC SERPL-MCNC: 65 MG/DL (ref 40–75)
HDLC SERPL: 54.2 % (ref 20–50)
HGB BLD-MCNC: 11.1 G/DL (ref 12–16)
IMM GRANULOCYTES # BLD AUTO: 0.03 K/UL (ref 0–0.04)
IMM GRANULOCYTES NFR BLD AUTO: 0.4 % (ref 0–0.5)
INR PPP: 1.1 (ref 0.8–1.2)
LDLC SERPL CALC-MCNC: 45 MG/DL (ref 63–159)
LYMPHOCYTES # BLD AUTO: 2.1 K/UL (ref 1–4.8)
LYMPHOCYTES NFR BLD: 29 % (ref 18–48)
MCH RBC QN AUTO: 24.7 PG (ref 27–31)
MCHC RBC AUTO-ENTMCNC: 30.9 G/DL (ref 32–36)
MCV RBC AUTO: 80 FL (ref 82–98)
MONOCYTES # BLD AUTO: 1.7 K/UL (ref 0.3–1)
MONOCYTES NFR BLD: 24 % (ref 4–15)
NEUTROPHILS # BLD AUTO: 3.2 K/UL (ref 1.8–7.7)
NEUTROPHILS NFR BLD: 44.4 % (ref 38–73)
NONHDLC SERPL-MCNC: 55 MG/DL
NRBC BLD-RTO: 0 /100 WBC
PLATELET # BLD AUTO: 143 K/UL (ref 150–450)
PMV BLD AUTO: 11 FL (ref 9.2–12.9)
POC IONIZED CALCIUM: 1.14 MMOL/L (ref 1.06–1.42)
POC PTINR: 1.2 (ref 0.9–1.2)
POC PTWBT: 14 SEC (ref 9.7–14.3)
POC TCO2 (MEASURED): 27 MMOL/L (ref 23–29)
POTASSIUM BLD-SCNC: 4.8 MMOL/L (ref 3.5–5.1)
POTASSIUM SERPL-SCNC: 4.3 MMOL/L (ref 3.5–5.1)
PROT SERPL-MCNC: 7.6 G/DL (ref 6–8.4)
PROTHROMBIN TIME: 11.8 SEC (ref 9–12.5)
RBC # BLD AUTO: 4.5 M/UL (ref 4–5.4)
SAMPLE: ABNORMAL
SAMPLE: NORMAL
SODIUM BLD-SCNC: 136 MMOL/L (ref 136–145)
SODIUM SERPL-SCNC: 135 MMOL/L (ref 136–145)
TRIGL SERPL-MCNC: 50 MG/DL (ref 30–150)
TSH SERPL DL<=0.005 MIU/L-ACNC: 2.56 UIU/ML (ref 0.4–4)
WBC # BLD AUTO: 7.21 K/UL (ref 3.9–12.7)

## 2022-06-19 PROCEDURE — 82803 BLOOD GASES ANY COMBINATION: CPT

## 2022-06-19 PROCEDURE — 93010 EKG 12-LEAD: ICD-10-PCS | Mod: ,,, | Performed by: INTERNAL MEDICINE

## 2022-06-19 PROCEDURE — 93005 ELECTROCARDIOGRAM TRACING: CPT

## 2022-06-19 PROCEDURE — 80053 COMPREHEN METABOLIC PANEL: CPT | Performed by: EMERGENCY MEDICINE

## 2022-06-19 PROCEDURE — 80061 LIPID PANEL: CPT | Performed by: EMERGENCY MEDICINE

## 2022-06-19 PROCEDURE — 85610 PROTHROMBIN TIME: CPT | Performed by: EMERGENCY MEDICINE

## 2022-06-19 PROCEDURE — 85610 PROTHROMBIN TIME: CPT

## 2022-06-19 PROCEDURE — 99223 1ST HOSP IP/OBS HIGH 75: CPT | Mod: AI,,, | Performed by: PSYCHIATRY & NEUROLOGY

## 2022-06-19 PROCEDURE — 85025 COMPLETE CBC W/AUTO DIFF WBC: CPT | Performed by: EMERGENCY MEDICINE

## 2022-06-19 PROCEDURE — 96374 THER/PROPH/DIAG INJ IV PUSH: CPT

## 2022-06-19 PROCEDURE — 25500020 PHARM REV CODE 255: Performed by: EMERGENCY MEDICINE

## 2022-06-19 PROCEDURE — 99223 PR INITIAL HOSPITAL CARE,LEVL III: ICD-10-PCS | Mod: AI,,, | Performed by: PSYCHIATRY & NEUROLOGY

## 2022-06-19 PROCEDURE — 82272 OCCULT BLD FECES 1-3 TESTS: CPT

## 2022-06-19 PROCEDURE — U0002 COVID-19 LAB TEST NON-CDC: HCPCS | Performed by: NURSE PRACTITIONER

## 2022-06-19 PROCEDURE — 99291 CRITICAL CARE FIRST HOUR: CPT | Mod: 25

## 2022-06-19 PROCEDURE — 63600175 PHARM REV CODE 636 W HCPCS: Performed by: EMERGENCY MEDICINE

## 2022-06-19 PROCEDURE — 12000002 HC ACUTE/MED SURGE SEMI-PRIVATE ROOM

## 2022-06-19 PROCEDURE — 99900035 HC TECH TIME PER 15 MIN (STAT)

## 2022-06-19 PROCEDURE — 93010 ELECTROCARDIOGRAM REPORT: CPT | Mod: ,,, | Performed by: INTERNAL MEDICINE

## 2022-06-19 PROCEDURE — 99291 CRITICAL CARE FIRST HOUR: CPT | Mod: CS,,, | Performed by: EMERGENCY MEDICINE

## 2022-06-19 PROCEDURE — 99291 PR CRITICAL CARE, E/M 30-74 MINUTES: ICD-10-PCS | Mod: CS,,, | Performed by: EMERGENCY MEDICINE

## 2022-06-19 PROCEDURE — 84443 ASSAY THYROID STIM HORMONE: CPT | Performed by: EMERGENCY MEDICINE

## 2022-06-19 PROCEDURE — 96361 HYDRATE IV INFUSION ADD-ON: CPT

## 2022-06-19 RX ORDER — LORAZEPAM 2 MG/ML
1 INJECTION INTRAMUSCULAR
Status: COMPLETED | OUTPATIENT
Start: 2022-06-19 | End: 2022-06-19

## 2022-06-19 RX ORDER — CLOPIDOGREL BISULFATE 75 MG/1
75 TABLET ORAL DAILY
Status: DISCONTINUED | OUTPATIENT
Start: 2022-06-20 | End: 2022-06-19

## 2022-06-19 RX ORDER — LORAZEPAM 2 MG/ML
1 INJECTION INTRAMUSCULAR ONCE AS NEEDED
Status: COMPLETED | OUTPATIENT
Start: 2022-06-19 | End: 2022-06-20

## 2022-06-19 RX ORDER — LABETALOL HCL 20 MG/4 ML
10 SYRINGE (ML) INTRAVENOUS EVERY 6 HOURS PRN
Status: DISCONTINUED | OUTPATIENT
Start: 2022-06-20 | End: 2022-06-23 | Stop reason: HOSPADM

## 2022-06-19 RX ORDER — ASPIRIN 81 MG/1
81 TABLET ORAL DAILY
Status: DISCONTINUED | OUTPATIENT
Start: 2022-06-20 | End: 2022-06-19

## 2022-06-19 RX ORDER — SODIUM CHLORIDE 0.9 % (FLUSH) 0.9 %
10 SYRINGE (ML) INJECTION
Status: DISCONTINUED | OUTPATIENT
Start: 2022-06-20 | End: 2022-06-23 | Stop reason: HOSPADM

## 2022-06-19 RX ORDER — HEPARIN SODIUM 5000 [USP'U]/ML
5000 INJECTION, SOLUTION INTRAVENOUS; SUBCUTANEOUS EVERY 8 HOURS
Status: DISCONTINUED | OUTPATIENT
Start: 2022-06-20 | End: 2022-06-20

## 2022-06-19 RX ORDER — ACETAMINOPHEN 325 MG/1
650 TABLET ORAL EVERY 6 HOURS PRN
Status: DISCONTINUED | OUTPATIENT
Start: 2022-06-20 | End: 2022-06-23 | Stop reason: HOSPADM

## 2022-06-19 RX ORDER — ATORVASTATIN CALCIUM 40 MG/1
40 TABLET, FILM COATED ORAL DAILY
Status: DISCONTINUED | OUTPATIENT
Start: 2022-06-20 | End: 2022-06-23 | Stop reason: HOSPADM

## 2022-06-19 RX ADMIN — LORAZEPAM 1 MG: 2 INJECTION INTRAMUSCULAR; INTRAVENOUS at 09:06

## 2022-06-19 RX ADMIN — IOHEXOL 100 ML: 350 INJECTION, SOLUTION INTRAVENOUS at 08:06

## 2022-06-19 RX ADMIN — SODIUM CHLORIDE, SODIUM LACTATE, POTASSIUM CHLORIDE, AND CALCIUM CHLORIDE 1000 ML: .6; .31; .03; .02 INJECTION, SOLUTION INTRAVENOUS at 10:06

## 2022-06-20 ENCOUNTER — PATIENT MESSAGE (OUTPATIENT)
Dept: NEUROLOGY | Facility: CLINIC | Age: 87
End: 2022-06-20
Payer: MEDICARE

## 2022-06-20 PROBLEM — Z75.8 DISCHARGE PLANNING ISSUES: Status: ACTIVE | Noted: 2022-06-20

## 2022-06-20 PROBLEM — J44.9 COPD (CHRONIC OBSTRUCTIVE PULMONARY DISEASE): Status: ACTIVE | Noted: 2022-06-20

## 2022-06-20 LAB
ALBUMIN SERPL BCP-MCNC: 3.6 G/DL (ref 3.5–5.2)
ALP SERPL-CCNC: 62 U/L (ref 55–135)
ALT SERPL W/O P-5'-P-CCNC: 9 U/L (ref 10–44)
ANION GAP SERPL CALC-SCNC: 9 MMOL/L (ref 8–16)
APTT BLDCRRT: 23.4 SEC (ref 21–32)
ASCENDING AORTA: 2.7 CM
AST SERPL-CCNC: 10 U/L (ref 10–40)
AV INDEX (PROSTH): 0.77
AV MEAN GRADIENT: 6 MMHG
AV PEAK GRADIENT: 11 MMHG
AV VALVE AREA: 2.49 CM2
AV VELOCITY RATIO: 0.68
BASOPHILS # BLD AUTO: 0.01 K/UL (ref 0–0.2)
BASOPHILS NFR BLD: 0.2 % (ref 0–1.9)
BILIRUB SERPL-MCNC: 1 MG/DL (ref 0.1–1)
BUN SERPL-MCNC: 13 MG/DL (ref 8–23)
CALCIUM SERPL-MCNC: 9 MG/DL (ref 8.7–10.5)
CHLORIDE SERPL-SCNC: 103 MMOL/L (ref 95–110)
CK MB SERPL-MCNC: <1 NG/ML (ref 0.1–6.5)
CK MB SERPL-RTO: NORMAL % (ref 0–5)
CK SERPL-CCNC: 40 U/L (ref 20–180)
CO2 SERPL-SCNC: 24 MMOL/L (ref 23–29)
CREAT SERPL-MCNC: 0.8 MG/DL (ref 0.5–1.4)
CTP QC/QA: YES
CV ECHO LV RWT: 0.38 CM
DIFFERENTIAL METHOD: ABNORMAL
DOP CALC AO PEAK VEL: 1.63 M/S
DOP CALC AO VTI: 22.96 CM
DOP CALC LVOT AREA: 3.2 CM2
DOP CALC LVOT DIAMETER: 2.03 CM
DOP CALC LVOT PEAK VEL: 1.11 M/S
DOP CALC LVOT STROKE VOLUME: 57.23 CM3
DOP CALCLVOT PEAK VEL VTI: 17.69 CM
E WAVE DECELERATION TIME: 178.03 MSEC
E/A RATIO: 0.87
E/E' RATIO: 12.89 M/S
ECHO LV POSTERIOR WALL: 1 CM (ref 0.6–1.1)
EJECTION FRACTION: 55 %
EOSINOPHIL # BLD AUTO: 0 K/UL (ref 0–0.5)
EOSINOPHIL NFR BLD: 0.5 % (ref 0–8)
ERYTHROCYTE [DISTWIDTH] IN BLOOD BY AUTOMATED COUNT: 17.2 % (ref 11.5–14.5)
EST. GFR  (AFRICAN AMERICAN): >60 ML/MIN/1.73 M^2
EST. GFR  (NON AFRICAN AMERICAN): >60 ML/MIN/1.73 M^2
ESTIMATED AVG GLUCOSE: 105 MG/DL (ref 68–131)
FRACTIONAL SHORTENING: 28 % (ref 28–44)
GLUCOSE SERPL-MCNC: 101 MG/DL (ref 70–110)
HBA1C MFR BLD: 5.3 % (ref 4–5.6)
HCT VFR BLD AUTO: 33.6 % (ref 37–48.5)
HGB BLD-MCNC: 10.3 G/DL (ref 12–16)
IMM GRANULOCYTES # BLD AUTO: 0.03 K/UL (ref 0–0.04)
IMM GRANULOCYTES NFR BLD AUTO: 0.5 % (ref 0–0.5)
INR PPP: 1.1 (ref 0.8–1.2)
INTERVENTRICULAR SEPTUM: 1.1 CM (ref 0.6–1.1)
LA MAJOR: 5.64 CM
LA MINOR: 5.71 CM
LA WIDTH: 5.4 CM
LEFT ATRIUM SIZE: 3.7 CM
LEFT ATRIUM VOLUME MOD: 92.9 CM3
LEFT ATRIUM VOLUME: 96.37 CM3
LEFT INTERNAL DIMENSION IN SYSTOLE: 3.77 CM (ref 2.1–4)
LEFT VENTRICLE DIASTOLIC VOLUME: 130.67 ML
LEFT VENTRICLE SYSTOLIC VOLUME: 60.78 ML
LEFT VENTRICULAR INTERNAL DIMENSION IN DIASTOLE: 5.22 CM (ref 3.5–6)
LEFT VENTRICULAR MASS: 208.59 G
LV LATERAL E/E' RATIO: 12.89 M/S
LV SEPTAL E/E' RATIO: 12.89 M/S
LYMPHOCYTES # BLD AUTO: 1.8 K/UL (ref 1–4.8)
LYMPHOCYTES NFR BLD: 31.8 % (ref 18–48)
MAGNESIUM SERPL-MCNC: 1.8 MG/DL (ref 1.6–2.6)
MCH RBC QN AUTO: 24 PG (ref 27–31)
MCHC RBC AUTO-ENTMCNC: 30.7 G/DL (ref 32–36)
MCV RBC AUTO: 78 FL (ref 82–98)
MONOCYTES # BLD AUTO: 0.9 K/UL (ref 0.3–1)
MONOCYTES NFR BLD: 15.2 % (ref 4–15)
MV A" WAVE DURATION": 10.85 MSEC
MV PEAK A VEL: 1.34 M/S
MV PEAK E VEL: 1.16 M/S
MV STENOSIS PRESSURE HALF TIME: 51.63 MS
MV VALVE AREA P 1/2 METHOD: 4.26 CM2
NEUTROPHILS # BLD AUTO: 2.9 K/UL (ref 1.8–7.7)
NEUTROPHILS NFR BLD: 51.8 % (ref 38–73)
NRBC BLD-RTO: 0 /100 WBC
PHOSPHATE SERPL-MCNC: 4 MG/DL (ref 2.7–4.5)
PISA MRMAX VEL: 0.06 M/S
PLATELET # BLD AUTO: 117 K/UL (ref 150–450)
PMV BLD AUTO: 11.3 FL (ref 9.2–12.9)
POTASSIUM SERPL-SCNC: 4.5 MMOL/L (ref 3.5–5.1)
PROT SERPL-MCNC: 6.5 G/DL (ref 6–8.4)
PROTHROMBIN TIME: 11.5 SEC (ref 9–12.5)
PULM VEIN S/D RATIO: 1.71
PV PEAK D VEL: 0.31 M/S
PV PEAK S VEL: 0.53 M/S
RA MAJOR: 4.54 CM
RA WIDTH: 3.34 CM
RBC # BLD AUTO: 4.29 M/UL (ref 4–5.4)
RIGHT VENTRICULAR END-DIASTOLIC DIMENSION: 3.7 CM
RV TISSUE DOPPLER FREE WALL SYSTOLIC VELOCITY 1 (APICAL 4 CHAMBER VIEW): 20.48 CM/S
SARS-COV-2 RDRP RESP QL NAA+PROBE: NEGATIVE
SINUS: 2.57 CM
SODIUM SERPL-SCNC: 136 MMOL/L (ref 136–145)
STJ: 2.14 CM
TDI LATERAL: 0.09 M/S
TDI SEPTAL: 0.09 M/S
TDI: 0.09 M/S
TRICUSPID ANNULAR PLANE SYSTOLIC EXCURSION: 2.21 CM
TROPONIN I SERPL DL<=0.01 NG/ML-MCNC: 0.01 NG/ML (ref 0–0.03)
WBC # BLD AUTO: 5.6 K/UL (ref 3.9–12.7)

## 2022-06-20 PROCEDURE — 25000003 PHARM REV CODE 250: Performed by: STUDENT IN AN ORGANIZED HEALTH CARE EDUCATION/TRAINING PROGRAM

## 2022-06-20 PROCEDURE — 97530 THERAPEUTIC ACTIVITIES: CPT

## 2022-06-20 PROCEDURE — 99233 PR SUBSEQUENT HOSPITAL CARE,LEVL III: ICD-10-PCS | Mod: ,,, | Performed by: PSYCHIATRY & NEUROLOGY

## 2022-06-20 PROCEDURE — 25000003 PHARM REV CODE 250: Performed by: PHYSICIAN ASSISTANT

## 2022-06-20 PROCEDURE — 63600175 PHARM REV CODE 636 W HCPCS: Performed by: STUDENT IN AN ORGANIZED HEALTH CARE EDUCATION/TRAINING PROGRAM

## 2022-06-20 PROCEDURE — 85025 COMPLETE CBC W/AUTO DIFF WBC: CPT | Performed by: NURSE PRACTITIONER

## 2022-06-20 PROCEDURE — 84100 ASSAY OF PHOSPHORUS: CPT | Performed by: NURSE PRACTITIONER

## 2022-06-20 PROCEDURE — 63600175 PHARM REV CODE 636 W HCPCS: Performed by: EMERGENCY MEDICINE

## 2022-06-20 PROCEDURE — 97166 OT EVAL MOD COMPLEX 45 MIN: CPT

## 2022-06-20 PROCEDURE — 92610 EVALUATE SWALLOWING FUNCTION: CPT

## 2022-06-20 PROCEDURE — 84484 ASSAY OF TROPONIN QUANT: CPT | Performed by: NURSE PRACTITIONER

## 2022-06-20 PROCEDURE — 83036 HEMOGLOBIN GLYCOSYLATED A1C: CPT | Performed by: NURSE PRACTITIONER

## 2022-06-20 PROCEDURE — 80053 COMPREHEN METABOLIC PANEL: CPT | Performed by: NURSE PRACTITIONER

## 2022-06-20 PROCEDURE — 25000003 PHARM REV CODE 250: Performed by: NURSE PRACTITIONER

## 2022-06-20 PROCEDURE — 85730 THROMBOPLASTIN TIME PARTIAL: CPT | Performed by: NURSE PRACTITIONER

## 2022-06-20 PROCEDURE — 97162 PT EVAL MOD COMPLEX 30 MIN: CPT

## 2022-06-20 PROCEDURE — 82553 CREATINE MB FRACTION: CPT | Performed by: NURSE PRACTITIONER

## 2022-06-20 PROCEDURE — 85610 PROTHROMBIN TIME: CPT | Performed by: NURSE PRACTITIONER

## 2022-06-20 PROCEDURE — 99233 SBSQ HOSP IP/OBS HIGH 50: CPT | Mod: ,,, | Performed by: PSYCHIATRY & NEUROLOGY

## 2022-06-20 PROCEDURE — 11000001 HC ACUTE MED/SURG PRIVATE ROOM

## 2022-06-20 PROCEDURE — 83735 ASSAY OF MAGNESIUM: CPT | Performed by: NURSE PRACTITIONER

## 2022-06-20 RX ORDER — GABAPENTIN 100 MG/1
100 CAPSULE ORAL 2 TIMES DAILY
Status: DISCONTINUED | OUTPATIENT
Start: 2022-06-20 | End: 2022-06-23 | Stop reason: HOSPADM

## 2022-06-20 RX ORDER — MYCOPHENOLATE MOFETIL 250 MG/1
500 CAPSULE ORAL 2 TIMES DAILY
Refills: 3 | Status: DISCONTINUED | OUTPATIENT
Start: 2022-06-20 | End: 2022-06-23 | Stop reason: HOSPADM

## 2022-06-20 RX ORDER — CALCIUM CARBONATE 200(500)MG
1000 TABLET,CHEWABLE ORAL DAILY
Status: DISCONTINUED | OUTPATIENT
Start: 2022-06-20 | End: 2022-06-23 | Stop reason: HOSPADM

## 2022-06-20 RX ORDER — GABAPENTIN 300 MG/1
300 CAPSULE ORAL NIGHTLY
Status: DISCONTINUED | OUTPATIENT
Start: 2022-06-20 | End: 2022-06-23 | Stop reason: HOSPADM

## 2022-06-20 RX ORDER — HYDROXYCHLOROQUINE SULFATE 200 MG/1
200 TABLET, FILM COATED ORAL 2 TIMES DAILY
Status: DISCONTINUED | OUTPATIENT
Start: 2022-06-20 | End: 2022-06-23 | Stop reason: HOSPADM

## 2022-06-20 RX ORDER — FAMOTIDINE 20 MG/1
20 TABLET, FILM COATED ORAL 2 TIMES DAILY
Status: DISCONTINUED | OUTPATIENT
Start: 2022-06-20 | End: 2022-06-22

## 2022-06-20 RX ORDER — ALBUTEROL SULFATE 90 UG/1
2 AEROSOL, METERED RESPIRATORY (INHALATION) EVERY 6 HOURS PRN
Status: DISCONTINUED | OUTPATIENT
Start: 2022-06-20 | End: 2022-06-23 | Stop reason: HOSPADM

## 2022-06-20 RX ORDER — LACOSAMIDE 10 MG/ML
100 SOLUTION ORAL EVERY 12 HOURS
Status: DISCONTINUED | OUTPATIENT
Start: 2022-06-20 | End: 2022-06-22

## 2022-06-20 RX ORDER — CHOLECALCIFEROL (VITAMIN D3) 25 MCG
2000 TABLET ORAL DAILY
Status: DISCONTINUED | OUTPATIENT
Start: 2022-06-20 | End: 2022-06-23 | Stop reason: HOSPADM

## 2022-06-20 RX ORDER — PREDNISONE 10 MG/1
10 TABLET ORAL DAILY
Status: DISCONTINUED | OUTPATIENT
Start: 2022-06-20 | End: 2022-06-20

## 2022-06-20 RX ORDER — ACETAMINOPHEN 500 MG
500 TABLET ORAL EVERY 4 HOURS PRN
Status: DISCONTINUED | OUTPATIENT
Start: 2022-06-20 | End: 2022-06-23 | Stop reason: HOSPADM

## 2022-06-20 RX ORDER — BACLOFEN 5 MG/1
5 TABLET ORAL 3 TIMES DAILY PRN
Status: DISCONTINUED | OUTPATIENT
Start: 2022-06-20 | End: 2022-06-23 | Stop reason: HOSPADM

## 2022-06-20 RX ORDER — FUROSEMIDE 40 MG/1
40 TABLET ORAL EVERY OTHER DAY
Status: DISCONTINUED | OUTPATIENT
Start: 2022-06-21 | End: 2022-06-23 | Stop reason: HOSPADM

## 2022-06-20 RX ADMIN — CALCIUM CARBONATE (ANTACID) CHEW TAB 500 MG 1000 MG: 500 CHEW TAB at 02:06

## 2022-06-20 RX ADMIN — FAMOTIDINE 20 MG: 20 TABLET ORAL at 09:06

## 2022-06-20 RX ADMIN — HYDROXYCHLOROQUINE SULFATE 200 MG: 200 TABLET, FILM COATED ORAL at 09:06

## 2022-06-20 RX ADMIN — GABAPENTIN 300 MG: 300 CAPSULE ORAL at 09:06

## 2022-06-20 RX ADMIN — Medication 2000 UNITS: at 02:06

## 2022-06-20 RX ADMIN — GABAPENTIN 100 MG: 100 CAPSULE ORAL at 09:06

## 2022-06-20 RX ADMIN — APIXABAN 5 MG: 5 TABLET, FILM COATED ORAL at 10:06

## 2022-06-20 RX ADMIN — LORAZEPAM 1 MG: 2 INJECTION INTRAMUSCULAR; INTRAVENOUS at 04:06

## 2022-06-20 RX ADMIN — LACOSAMIDE 100 MG: 10 SOLUTION ORAL at 09:06

## 2022-06-20 RX ADMIN — ACETAMINOPHEN 650 MG: 325 TABLET ORAL at 10:06

## 2022-06-20 RX ADMIN — MYCOPHENOLATE MOFETIL 500 MG: 250 CAPSULE ORAL at 09:06

## 2022-06-20 RX ADMIN — SODIUM CHLORIDE 500 ML: 0.9 INJECTION, SOLUTION INTRAVENOUS at 01:06

## 2022-06-20 RX ADMIN — ATORVASTATIN CALCIUM 40 MG: 40 TABLET, FILM COATED ORAL at 10:06

## 2022-06-20 RX ADMIN — QUETIAPINE FUMARATE 12.5 MG: 25 TABLET, FILM COATED ORAL at 09:06

## 2022-06-20 NOTE — ED NOTES
Telemetry Verification   Patient placed on Telemetry Box  Verified with War Room  Box # [id number]   Monitor Tech    Rate 117   Rhythm ST

## 2022-06-20 NOTE — PLAN OF CARE
Problem: Physical Therapy  Goal: Physical Therapy Goal  Description: Goals to be met by: 22    Patient will increase functional independence with mobility by performin. Supine to sit with supervision  2. Sit to supine with supervision  3. Rolling to Left and Right with supervision  4. Sit to stand transfer with minimum assistance  5. Bed to chair transfer with minimum assistance using LRAD as needed  6. Gait  x 10 feet with minimum assistance using LRAD as needed  7. Wheelchair propulsion x25 feet with contact guard assistance using bilateral uppper extremities  8. Ascend/descend 5 stair with bilateral handrails moderate assistance using LRAD as needed  9. Lower extremity exercise program x10 reps per handout, with supervision    Outcome: Ongoing, Progressing     Pt tolerated PT session fairly.      All needs met, all questions answered.  Porfirio Rutledge PT, DPT

## 2022-06-20 NOTE — H&P
See the consult noted dated 6/19/2022 for full H&P.  MRI brain(technically limited by unavailable head coil) was obtained and revealed and acute infarction in the right MCA territory.        -Admit to Vascular Neurology for a stroke work up.  -Will defer DAPT for now given the patient's hx of SDH that is still present on imaging. Risks/benefits need to be discussed.        Lissa Moran DNP  [facility]  Department of Vascular Neurology   [facility]  [phone]

## 2022-06-20 NOTE — PROGRESS NOTES
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 female with PMHx prior stroke, AFib (on eliquis), HTN, HLD and TIA [place] [facility] [place]ided weakness. LKN 2300 6/18/2022. On evaluation patient is hypotensive with left sided hemiparesis. NIHSS 4. Labs CBC/CMP benign; mild Cr increase from priors. Imaging reviewed; CTA limited by contrast extravasation. CTH prelim read with hypodensity in MCA distribution, however, similar to prior CTH films. CTA without LVO. tPA not given as patient OOW and on anticoagulation. Ordered MRI brain acute ischemic protocol for further evaluation as patient is within thrombectomy window. MRI brain with small linear area of diffusion restriction along the right coronary radiata, suggestive of acute infarct. Stroke Etiology: Ischemic Small Vessel Disease (Lacunar). PT/OT recommending inpatient rehab; patient notes poor therapeutic relationships in the past and is considering outpatient rehab vs home health. Will discuss further.    Antithrombotics for secondary stroke prevention: Anticoagulants: Apixaban 5 mg BID     Statins for secondary stroke prevention and hyperlipidemia, if present:   Statins: Atorvastatin- 40 mg daily    Aggressive risk factor modification: HTN, HLD, Diet, Exercise, A-Fib     Rehab efforts: The patient has been evaluated by a stroke team provider and the therapy needs have been fully considered based off the presenting complaints and exam findings. The following therapy evaluations are needed: PT evaluate and treat, OT evaluate and treat, SLP evaluate and treat, PM&R evaluate for appropriate placement    Diagnostics ordered/pending: HgbA1C to assess blood glucose levels, Lipid Profile to assess cholesterol levels, MRI head without contrast to assess brain parenchyma, TTE to assess cardiac function/status , TSH  to assess thyroid function    VTE prophylaxis: None: Reason for No Pharmacological VTE Prophylaxis: Currently on anticoagulation    BP parameters: Infarct: No intervention, SBP <220    AF (paroxysmal atrial fibrillation)  Stroke risk factor  On eliquis; continue   EKG 06/19/2022 NSR  Not a candidate for tPA    Hyperlipidemia  Stroke risk factor  Lipid panel pending  Continue atorvastatin 40 mg QD    Hypertension, essential  Stroke risk factor  Workup on going  No hemorrhage on CTA  Recommend permissive HTN     RA (rheumatoid arthritis)  Continue home medications    PUD (peptic ulcer disease)  Continue home medications    COPD (chronic obstructive pulmonary disease)  PRN albuterol per home medication review    Age-related physical debility  PT/OT    Generalized weakness  PT/OT    Vitamin D insufficiency  Continue home medications    Discharge planning issues  PT/OT recommending inpatient rehab  - patient considering inpatient rehab vs home health vs outpatient rehab         No notes on file    STROKE DOCUMENTATION   Acute Stroke Times   Last Known Normal Date: 06/18/22  Last Known Normal Time: 2300  Symptom Onset Date: 06/19/22  Symptom Onset Time: 0830  Stroke Team Called Date: 06/19/22  Stroke Team Called Time: 1950  Stroke Team Arrival Date: 06/19/22  Stroke Team Arrival Time: 1955  CT Interpretation Time: 2015  Alteplase Recommended: No  CTA Interpretation Time: 2020    NIH Scale:  1a. Level of Consciousness: 0-->Alert, keenly responsive  1b. LOC Questions: 0-->Answers both questions correctly  1c. LOC Commands: 0-->Performs both tasks correctly  2. Best Gaze: 0-->Normal  3. Visual: 0-->No visual loss  4. Facial Palsy: 0-->Normal symmetrical movements  5a. Motor Arm, Left: 2-->Some effort against gravity, limb cannot get to or maintain (if cued) 90 (or 45) degrees, drifts down to bed, but has some effort against gravity  5b. Motor Arm, Right: 0-->No drift, limb holds 90 (or 45) degrees for full 10 secs  6a. Motor  Leg, Left: 2-->Some effort against gravity, leg falls to bed by 5 secs, but has some effort against gravity  6b. Motor Leg, Right: 0-->No drift, leg holds 30 degree position for full 5 secs  7. Limb Ataxia: 0-->Absent  8. Sensory: 0-->Normal, no sensory loss  9. Best Language: 0-->No aphasia, normal  10. Dysarthria: 0-->Normal  11. Extinction and Inattention (formerly Neglect): 0-->No abnormality  Total (NIH Stroke Scale): 4       Modified Enrike Score: 1  Marion Coma Scale:    ABCD2 Score:    [id number] Score:8  HAS -BLED Score:   ICH Score:   Hunt & Jain Classification:      Hemorrhagic change of an Ischemic Stroke: Does this patient have an ischemic stroke with hemorrhagic changes? No     Neurologic Chief Complaint: Lacunar stroke    Subjective:     Interval History: Patient is seen for follow-up neurological assessment and treatment recommendations:     No acute or concerning events noted by overnight staff. Vital signs are stable and within normal limits. Patient is conscious and comfortable. MRI notable for small linear area of diffusion restriction along the right coronary radiata, suggestive of acute infarct. No large vessel occlusion. Pending transfer to NPU floor for PT/OT evaluation. Mild improvement in left sided weakness, however, still significantly weak.    HPI, Past Medical, Family, and Social History remains the same as documented in the initial encounter.     Review of Systems   Constitutional:  Positive for activity change and fatigue. Negative for fever.   HENT:  Negative for facial swelling and hearing loss.    Eyes:  Negative for pain, discharge and visual disturbance.   Respiratory:  Negative for cough and shortness of breath.    Cardiovascular:  Negative for chest pain and palpitations.   Gastrointestinal:  Negative for abdominal pain and nausea.   Musculoskeletal:  Positive for arthralgias. Negative for neck pain.   Skin:  Negative for color change and rash.   Neurological:  Positive for  weakness. Negative for facial asymmetry, speech difficulty and numbness.   Psychiatric/Behavioral:  Negative for agitation and confusion.      Scheduled Meds:   apixaban  5 mg Oral BID    atorvastatin  40 mg Oral Daily    onabotulinumtoxina  200 Units Intramuscular Q90 Days     Continuous Infusions:   sodium chloride 0.9%       PRN Meds:acetaminophen, labetaloL, lorazepam, sodium chloride 0.9%, sodium chloride 0.9%    Objective:     Vital Signs (Most Recent):  Temp: 98.1 °F (36.7 °C) (06/20/22 1110)  Pulse: 82 (06/20/22 1110)  Resp: 17 (06/20/22 1110)  BP: 139/65 (06/20/22 1110)  SpO2: 99 % (06/20/22 1110)  BP Location: Right arm    Vital Signs Range (Last 24H):  Temp:  [98 °F (36.7 °C)-98.1 °F (36.7 °C)]   Pulse:  [78-91]   Resp:  [16-20]   BP: (109-150)/()   SpO2:  [95 %-100 %]   BP Location: Right arm    Physical Exam  Vitals reviewed.   Constitutional:       General: She is not in acute distress.     Appearance: Normal appearance. She is normal weight. She is not ill-appearing.   HENT:      Head: Normocephalic and atraumatic.      Mouth/Throat:      Mouth: Mucous membranes are moist.      Pharynx: Oropharynx is clear.   Eyes:      General:         Right eye: No discharge.         Left eye: No discharge.      Extraocular Movements: Extraocular movements intact.      Conjunctiva/sclera: Conjunctivae normal.      Pupils: Pupils are equal, round, and reactive to light.   Cardiovascular:      Rate and Rhythm: Normal rate.   Pulmonary:      Effort: Pulmonary effort is normal. No respiratory distress.   Neurological:      Mental Status: She is alert.      Motor: Weakness present.       Neurological Exam:   LOC: alert  Attention Span: Good   Language: No aphasia  Articulation: No dysarthria  Orientation: Person, Place, Time   Visual Fields: Full  EOM (CN III, IV, VI): Full/intact  Pupils (CN II, III): PERRL  Facial Sensation (CN V): Normal  Facial Movement (CN VII): Symmetric facial expression    Motor: Arm  left  Paresis: 3/5; extensor weakness  Leg left  Paresis: 3-/5; flexor weakness  Arm right  Normal 5/5  Leg right Normal 5/5  Cerebellum: No evidence of appendicular or axial ataxia, cannot perform on left due to weakness  Sensation: Intact to light touch and temperature    Laboratory:  CMP:   Recent Labs   Lab 06/20/22 0453   CALCIUM 9.0   ALBUMIN 3.6   PROT 6.5      K 4.5   CO2 24      BUN 13   CREATININE 0.8   ALKPHOS 62   ALT 9*   AST 10   BILITOT 1.0     CBC:   Recent Labs   Lab 06/20/22 0453   WBC 5.60   RBC 4.29   HGB 10.3*   HCT 33.6*   *   MCV 78*   MCH 24.0*   MCHC 30.7*     Lipid Panel:   Recent Labs   Lab 06/19/22 2045   CHOL 120   LDLCALC 45.0*   HDL 65   TRIG 50     Coagulation:   Recent Labs   Lab 06/20/22 0453   INR 1.1   APTT 23.4     Platelet Aggregation Study: No results for input(s): PLTAGG, PLTAGINTERP, PLTAGREGLACO, ADPPLTAGGREG in the last 168 hours.  Hgb A1C:   Recent Labs   Lab 06/20/22  0226   HGBA1C 5.3     TSH:   Recent Labs   Lab 06/19/22 2045   TSH 2.565       Diagnostic Results     Brain Imaging   MRI Brain Ischemic Inter Pro Incl MRA W/O Con 06/19/2022    Narrative  EXAMINATION:  MRI BRAIN ISCHEMIC INTERVENTIONAL PROTOCOL INCL MRA W/O CONTRAST    CLINICAL HISTORY:  CVA;    TECHNIQUE:  Multiplanar multisequence MR imaging of the brain was performed without the use of intravenous contrast.    Obtained via the same acquisition from the MRI brain, a time-of-flight MR arteriogram of the intracranial vasculature with multiple MIPS reconstructions.    Images were obtained there brain ischemic protocol.    COMPARISON:  CTA 06/19/2022    FINDINGS:  Technically limited exam due to unavailability of MRI head coil.    Brain:    Ventricles and sulci are stable in size and configuration.  Redemonstration of stable right subdural collection.    Linear area of diffusion restriction along the right corona radiata suggestive of acute infarct.  Confluent supratentorial  periventricular white matter T2/FLAIR signal suggestive of chronic microvascular ischemic change.  Remote bilateral cerebellar infarcts.  No parenchymal mass, hemorrhage, or edema.    Bone marrow signal intensity unremarkable.    MRA:    Distal internal carotid arteries are normal in caliber.  No significant stenosis at either carotid bifurcation.    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  The basilar artery appears within normal limits.    SAMMI trifurcation.  The ACAs, MCAs and PCAs demonstrate no evidence of  high-grade stenosis, focal occlusion or intracranial aneurysm.    Impression  Small linear area of diffusion restriction along the right coronary radiata, suggestive of acute infarct.  No large vessel occlusion.    Sequela of chronic microvascular ischemic changes.    Stable right subdural collection.    Remote bilateral cerebellar infarcts.    Dr. Culver discussed preliminary findings with Dr. Guzmán on 06/19/2022 at 22:00.    Electronically signed by resident: Anna Culver  Date:    06/19/2022  Time:    21:48    Electronically signed by: Flo Scott MD  Date:    06/19/2022  Time:    22:08    Vessel Imaging   CTA STROKE MULTI-PHASE 06/19/2022    Narrative  EXAMINATION:  CTA STROKE MULTI-PHASE    CLINICAL HISTORY:  Neuro deficit, acute, stroke suspected;    TECHNIQUE:  Axial CT images obtained throughout the region of the head after the administration of intravenous contrast.  CT angiogram was performed from through the cervical and intracranial vasculature during the IV bolus administration of 100mL of Omnipaque 350.  Multiplanar MPR and MIP reformats were performed.    Additional 2 delayed images of the intracranial vasculature was performed.    Rapid AI assessment were included.    COMPARISON:  CT 03/05/2022 12/13/2021    FINDINGS:  Evaluation is limited due to suboptimal contrast timing and unavailable noncontrast images.    CT head:    Ventricles size and configuration  are similar to prior.  There is stable 3 mm low-density subdural collection overlying the right frontal lobe, similar to prior.    The brain appears unchanged.  Supratentorial periventricular white matter hypoattenuation suggestive of chronic microvascular ischemic change.  Remote bilateral cerebellar infarcts.  Gray and white matter differentiation is preserved.  No parenchymal mass, hemorrhage, edema or major vascular distribution infarct.    Hyperostosis frontalis interna.  No depressed calvarial fracture.  Teeth hardware.  Paranasal sinuses and mastoid air cells are essentially clear.      CTA:    There is stable narrowing right common carotid origin.  Bilateral carotid bifurcation calcifications without significant narrowing, allowing study limitations.  There is significant cavernous and petrous segment calcifications bilaterally.  No significant stenosis.    Right vertebral artery origin is patent.  Proximal left vertebral artery is not visualized..  The cervical vertebral arteries are normal in course and caliber. Vertebrobasilar system is without focal abnormality.    The anterior, middle, and posterior cerebral arteries are within normal limits, without evidence of significant stenosis, focal occlusion, or intracranial aneurysm formation.    Head and neck soft tissue:    Enlarged thyroid with multiple nodules, dominant nodule within the right thyroid lobe extending to the superior mediastinum and measuring 6.5 cm, similar to prior.  There is a mass effect on with leftward shift of the mediastinal structures.    Leftward midline shift of the trachea due to large thyroid, similar to prior.  Ground-glass opacities and subsegmental atelectases within upper lung zones.    Advanced degenerative spine changes.  There is chronic osteomyelitis at the C6-C7 level.    Impression  Limited exam due to suboptimal contrast timing and unavailable noncontrast images.  Additional evaluation, as clinically warranted    No  major vascular distribution infarct or large vessel occlusion.    Stable 3 mm subdural collection overlying the right frontal lobe.    Bilateral distal ICAs atherosclerotic disease without significant stenosis.    Significant enlarged thyroid with multiple nodules, the dominant nodule within the right thyroid lobe is stable in size.  There is a stable significant mass effect on the and superior mediastinal structures with leftward shift.    Ground-glass opacities within the upper lung zones, could represent aspiration, infection, or noninfectious inflammatory process.    Chronic discitis-osteomyelitis at the C6-C7 level.    Additional findings as above.    Electronically signed by resident: Anna Culver  Date:    06/19/2022  Time:    20:40    Electronically signed by: Flo Scott MD  Date:    06/19/2022  Time:    21:48    Cardiac Imaging   Pending      Cliff Monson MD  [facility]  Department of Vascular Neurology   [facility] - Emergency Dept

## 2022-06-20 NOTE — ED TRIAGE NOTES
Pt arrives from home with worsening L sided weakness starting early this morning. Per pt daughter, pt is able to perform ADLs independently but was unable to ambulate to bathroom this morning. No slurred speech or facial droop note. Hx of subdural post fall in December

## 2022-06-20 NOTE — ED PROVIDER NOTES
Encounter Date: 6/19/2022       History     Chief Complaint   Patient presents with    Extremity Weakness     Pt c/o L sided weakness starting this AM. Per pt's family, pt able to perform ADLs at baseline, unable to perform ADLs today. No slurred speech or facial drooping noted. Hx of subdural in December.     Cerebrovascular Accident     85 yo W with pmhx SDH (12/21), RA, afib on eliquis, CKD III, RA, cervical dystonia, iron deficiency anemia, vascular dementia, anemia, HTN presents with a chief complaint of left-sided weakness.  Patient notes she went to bed at 11pm last night feeling normal last night.  She woke today at approximately 8:30 a.m. and felt off.  She is not certain if she had weakness in left arm at that time but suspects he did.  She notes that she suffers from rheumatoid arthritis in sometimes her hands get weaker so she attributed symptoms to that.  She had a home health aide that helped her throughout the day.  Her daughter came in from out of town this afternoon and noted severe weakness in the left upper and lower extremities they brought her to the ER.  Patient denies any headache.  No history of similar symptoms.  No trauma. 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        Review of patient's allergies indicates:  No Known Allergies  Past Medical History:   Diagnosis Date    Acute cystitis without hematuria 2/27/2020    Acute hypoxemic respiratory failure 4/11/2018    Acute respiratory failure with hypoxia 3/2/2020    Altered mental status     Anemia     Arthritis     Bilateral hand pain 3/14/2018    Branch retinal vein occlusion, left eye 2/20/2015    Chronic bilateral low back pain without sciatica 3/23/2017    Chronic renal failure in pediatric patient, stage 3 (moderate) 4/15/2018    Cognitive communication deficit 12/19/2017    Cystoid macular edema, left eye 2/20/2015    Cystoid macular edema, left eye 2/20/2015    Delirium 12/30/2021    DJD (degenerative joint  disease) of cervical spine 5/15/2013    Enterococcal bacteremia     Fatty liver 8/26/2014    Goiter 4/9/2018    Hashimoto's disease     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    Hypertension     Hypertensive encephalopathy     IBS (irritable bowel syndrome) 6/21/2017    IGT (impaired glucose tolerance) 1/12/2016    Iron deficiency anemia secondary to inadequate dietary iron intake 6/24/2013    Joint pain     Keratoconjunctivitis sicca of both eyes not specified as Sjogren's 7/29/2016    Leiomyoma of uterus, unspecified 9/16/2014    Long QT interval 6/29/2016    Due to medication (plaquenil)     Macular edema 1/12/2015    Multinodular goiter 1/12/2016    Neuropathy 8/23/2017    Plaquenil causing adverse effect in therapeutic use 10/7/2016    Pneumococcal vaccine refused 8/17/2016    Pneumonia due to Streptococcus pneumoniae 4/5/2018    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/2015    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    s/p Right Total knee replacement 5/15/2013    Scoliosis of thoracic spine 5/15/2013    Small vessel disease, cerebrovascular 12/28/2017    Stroke     UTI (urinary tract infection) 12/29/2018    Vascular dementia 12/6/2017     Past Surgical History:   Procedure Laterality Date    BREAST CYST EXCISION      CATARACT EXTRACTION      COLONOSCOPY N/A 9/29/2015    Procedure: COLONOSCOPY;  Surgeon: FIDELINA Sanchez MD;  Location: [facility] ENDO ([address] [place] [address]);  Service: Endoscopy;  Laterality: N/A;    EYE SURGERY      INJECTION OF ANESTHETIC AGENT AROUND NERVE Left 4/19/2021    Procedure: BLOCK, NERVE, FEMORAL AND OBTURATOR;  Surgeon: Shivam Gonazlez MD;  Location: [facility] [place] PAIN T;  Service: Pain Management;  Laterality: Left;  consent needed    JOINT REPLACEMENT      right knee    KNEE SURGERY Left 12/31/2013    TKR    left parotidectomy      mixed tumor    CT EVAL,SWALLOW FUNCTION,CINE/VIDEO RECORD  6/5/2021         SALIVARY GLAND SURGERY      TONSILLECTOMY      UPPER  GASTROINTESTINAL ENDOSCOPY       Family History   Problem Relation Age of Onset    Hypertension Mother     Heart disease Mother     Hyperthyroidism Mother     Prostate cancer Father         prostate cancer    Cancer Father     Breast cancer Maternal Grandmother     Hyperthyroidism Other     Colon cancer Neg Hx      Social History     Tobacco Use    Smoking status: Never Smoker    Smokeless tobacco: Never Used   Substance Use Topics    Alcohol use: Yes     Alcohol/week: 0.0 standard drinks     Comment: very seldom     Drug use: No     Review of Systems   Constitutional: Negative for fever.   HENT: Negative for sore throat.    Respiratory: Negative for shortness of breath.    Cardiovascular: Negative for chest pain.   Gastrointestinal: Negative for nausea.   Genitourinary: Negative for dysuria.   Musculoskeletal: Negative for back pain.   Skin: Negative for rash.   Neurological: Positive for weakness. Negative for headaches.   Hematological: Does not bruise/bleed easily.       Physical Exam     Initial Vitals [06/19/22 1946]   BP Pulse Resp Temp SpO2   (!) 109/58 91 16 98 °F (36.7 °C) 98 %      MAP       --         Physical Exam    Nursing note and vitals reviewed.  Constitutional: She appears well-developed and well-nourished. She is not diaphoretic. No distress.   HENT:   Head: Normocephalic and atraumatic.   Eyes: EOM are normal. Right eye exhibits no discharge. Left eye exhibits no discharge. No scleral icterus.   Neck: Neck supple. No JVD present.   Normal range of motion.  Cardiovascular: Normal rate and normal heart sounds. An irregularly irregular rhythm present.  Exam reveals no gallop and no friction rub.    No murmur heard.  Pulmonary/Chest: Breath sounds normal. No respiratory distress. She has no wheezes. She has no rhonchi. She has no rales. She exhibits no tenderness.   Abdominal: Abdomen is soft. Bowel sounds are normal. She exhibits no distension and no mass. There is no abdominal  tenderness. There is no rebound and no guarding.   Musculoskeletal:         General: No tenderness or edema. Normal range of motion.      Cervical back: Normal range of motion and neck supple.     Neurological: She is alert and oriented to person, place, and time. No sensory deficit.   Pronator drift in LUE  2/5 strength in LUE and LLE   Skin: Skin is warm and dry. Capillary refill takes less than 2 seconds.   Psychiatric: She has a normal mood and affect.         ED Course   Procedures  Labs Reviewed   CBC W/ AUTO DIFFERENTIAL - Abnormal; Notable for the following components:       Result Value    Hemoglobin 11.1 (*)     Hematocrit 35.9 (*)     MCV 80 (*)     MCH 24.7 (*)     MCHC 30.9 (*)     RDW 17.3 (*)     Platelets 143 (*)     Mono # 1.7 (*)     Mono % 24.0 (*)     All other components within normal limits   COMPREHENSIVE METABOLIC PANEL - Abnormal; Notable for the following components:    Sodium 135 (*)     Glucose 128 (*)     Total Bilirubin 1.2 (*)     eGFR if  43.5 (*)     eGFR if non  37.8 (*)     All other components within normal limits   LIPID PANEL - Abnormal; Notable for the following components:    LDL Cholesterol 45.0 (*)     HDL/Cholesterol Ratio 54.2 (*)     Total Cholesterol/HDL Ratio 1.8 (*)     All other components within normal limits   ISTAT PROCEDURE - Abnormal; Notable for the following components:    POC Glucose 163 (*)     All other components within normal limits   PROTIME-INR   TSH   HEMOGLOBIN A1C   SARS-COV-2 RDRP GENE   ISTAT PROCEDURE          Imaging Results          MRI Brain Ischemic Inter Pro Incl MRA W/O Con (Final result)  Result time 06/19/22 22:08:39    Final result by Flo Scott MD (06/19/22 22:08:39)                 Impression:      Small linear area of diffusion restriction along the right coronary radiata, suggestive of acute infarct.  No large vessel occlusion.    Sequela of chronic microvascular ischemic changes.    Stable right  subdural collection.    Remote bilateral cerebellar infarcts.    Dr. Culver discussed preliminary findings with Dr. Guzmán on 06/19/2022 at 22:00.    Electronically signed by resident: Anna Culver  Date:    06/19/2022  Time:    21:48    Electronically signed by: Flo Scott MD  Date:    06/19/2022  Time:    22:08             Narrative:    EXAMINATION:  MRI BRAIN ISCHEMIC INTERVENTIONAL PROTOCOL INCL MRA W/O CONTRAST    CLINICAL HISTORY:  CVA;    TECHNIQUE:  Multiplanar multisequence MR imaging of the brain was performed without the use of intravenous contrast.    Obtained via the same acquisition from the MRI brain, a time-of-flight MR arteriogram of the intracranial vasculature with multiple MIPS reconstructions.    Images were obtained there brain ischemic protocol.    COMPARISON:  CTA 06/19/2022    FINDINGS:  Technically limited exam due to unavailability of MRI head coil.    Brain:    Ventricles and sulci are stable in size and configuration.  Redemonstration of stable right subdural collection.    Linear area of diffusion restriction along the right corona radiata suggestive of acute infarct.  Confluent supratentorial periventricular white matter T2/FLAIR signal suggestive of chronic microvascular ischemic change.  Remote bilateral cerebellar infarcts.  No parenchymal mass, hemorrhage, or edema.    Bone marrow signal intensity unremarkable.    MRA:    Distal internal carotid arteries are normal in caliber.  No significant stenosis at either carotid bifurcation.    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  The basilar artery appears within normal limits.    SAMMI trifurcation.  The ACAs, MCAs and PCAs demonstrate no evidence of  high-grade stenosis, focal occlusion or intracranial aneurysm.                               CTA STROKE MULTI-PHASE (Final result)  Result time 06/19/22 21:48:08    Final result by Flo Scott MD (06/19/22 21:48:08)                 Impression:       Limited exam due to suboptimal contrast timing and unavailable noncontrast images.  Additional evaluation, as clinically warranted    No major vascular distribution infarct or large vessel occlusion.    Stable 3 mm subdural collection overlying the right frontal lobe.    Bilateral distal ICAs atherosclerotic disease without significant stenosis.    Significant enlarged thyroid with multiple nodules, the dominant nodule within the right thyroid lobe is stable in size.  There is a stable significant mass effect on the and superior mediastinal structures with leftward shift.    Ground-glass opacities within the upper lung zones, could represent aspiration, infection, or noninfectious inflammatory process.    Chronic discitis-osteomyelitis at the C6-C7 level.    Additional findings as above.    Electronically signed by resident: Anna Culver  Date:    06/19/2022  Time:    20:40    Electronically signed by: Flo Scott MD  Date:    06/19/2022  Time:    21:48             Narrative:    EXAMINATION:  CTA STROKE MULTI-PHASE    CLINICAL HISTORY:  Neuro deficit, acute, stroke suspected;    TECHNIQUE:  Axial CT images obtained throughout the region of the head after the administration of intravenous contrast.  CT angiogram was performed from through the cervical and intracranial vasculature during the IV bolus administration of 100mL of Omnipaque 350.  Multiplanar MPR and MIP reformats were performed.    Additional 2 delayed images of the intracranial vasculature was performed.    Rapid AI assessment were included.    COMPARISON:  CT 03/05/2022 12/13/2021    FINDINGS:  Evaluation is limited due to suboptimal contrast timing and unavailable noncontrast images.    CT head:    Ventricles size and configuration are similar to prior.  There is stable 3 mm low-density subdural collection overlying the right frontal lobe, similar to prior.    The brain appears unchanged.  Supratentorial periventricular white matter  hypoattenuation suggestive of chronic microvascular ischemic change.  Remote bilateral cerebellar infarcts.  Gray and white matter differentiation is preserved.  No parenchymal mass, hemorrhage, edema or major vascular distribution infarct.    Hyperostosis frontalis interna.  No depressed calvarial fracture.  Teeth hardware.  Paranasal sinuses and mastoid air cells are essentially clear.      CTA:    There is stable narrowing right common carotid origin.  Bilateral carotid bifurcation calcifications without significant narrowing, allowing study limitations.  There is significant cavernous and petrous segment calcifications bilaterally.  No significant stenosis.    Right vertebral artery origin is patent.  Proximal left vertebral artery is not visualized..  The cervical vertebral arteries are normal in course and caliber. Vertebrobasilar system is without focal abnormality.    The anterior, middle, and posterior cerebral arteries are within normal limits, without evidence of significant stenosis, focal occlusion, or intracranial aneurysm formation.    Head and neck soft tissue:    Enlarged thyroid with multiple nodules, dominant nodule within the right thyroid lobe extending to the superior mediastinum and measuring 6.5 cm, similar to prior.  There is a mass effect on with leftward shift of the mediastinal structures.    Leftward midline shift of the trachea due to large thyroid, similar to prior.  Ground-glass opacities and subsegmental atelectases within upper lung zones.    Advanced degenerative spine changes.  There is chronic osteomyelitis at the C6-C7 level.                                 Medications   lorazepam injection 1 mg (has no administration in time range)   sodium chloride 0.9% flush 10 mL (has no administration in time range)   sodium chloride 0.9% bolus 500 mL (has no administration in time range)   labetalol 20 mg/4 mL (5 mg/mL) IV syring (has no administration in time range)   heparin (porcine)  injection 5,000 Units (has no administration in time range)   acetaminophen tablet 650 mg (has no administration in time range)   atorvastatin tablet 40 mg (has no administration in time range)   sodium chloride 0.9% bolus 500 mL (has no administration in time range)   iohexoL (OMNIPAQUE 350) injection 100 mL (100 mLs Intravenous Given 6/19/22 2011)   lactated ringers bolus 1,000 mL (1,000 mLs Intravenous New Bag 6/19/22 2228)   lorazepam injection 1 mg (1 mg Intravenous Given 6/19/22 2116)     Medical Decision Making:   History:   I obtained history from: someone other than patient.  Old Medical Records: I decided to obtain old medical records.  Initial Assessment:   85 yo W with pmhx SDH (12/21), RA, afib on eliquis, CKD III, RA, cervical dystonia, iron deficiency anemia, vascular dementia, anemia, HTN presents with a chief complaint of left-sided weakness.  Time course a bit suspect but definitely less than 24 hours.  Stroke code activated on arrival to triage.  Differential Diagnosis:   CVA, brain tumor, ICH, electrolyte abnormalities, infectious issues  Clinical Tests:   Lab Tests: Ordered  Radiological Study: Ordered  Medical Tests: Ordered  ED Management:  Stroke code activated as above.  Labs.  Vascular neurology consult.    Reassessment: Unfortunately, contrast extravasated during CTA and images not 100% diagnostic. There is no ICH. Chem-8 with mild KERMIT on CKD - creatinine 1.4. Will administer 1L IVF. Will obtain MRI ischemic protocol. Of note, a coil for the brain MRI is down, so images will be somewhat limited.    Reassessment:  Labs with mild KERMIT but no emergent abnormalities.  MRI reveals right corona radiata CVA.  She will be admitted to vascular neurology.            Attending Attestation:         Attending Critical Care:   Critical Care Times:   Direct Patient Care (initial evaluation, reassessments, and time considering the case)................................................................15  minutes.   Additional History from reviewing old medical records or taking additional history from the family, EMS, PCP, etc.......................5 minutes.   Ordering, Reviewing, and Interpreting Diagnostic Studies...............................................................................................................5 minutes.   Documentation..................................................................................................................................................................................5 minutes.   Consultation with other Physicians. .................................................................................................................................................5 minutes.   ==============================================================  · Total Critical Care Time - exclusive of procedural time: 35 minutes.  ==============================================================  Critical care was necessary to treat or prevent imminent or life-threatening deterioration of the following conditions: stroke.                    Clinical Impression:   Final diagnoses:  [I63.9] Stroke (Primary)  [N17.9] KERMIT (acute kidney injury)  [I63.311] Thrombotic stroke involving right middle cerebral artery          ED Disposition Condition    Admit               Rob Guzmán MD  06/19/22 2341       Rob Guzmán MD  06/19/22 2340

## 2022-06-20 NOTE — ED NOTES
Neuro service at bedside at this time.  Patient had not wanted to stand with PT, she refused.  She stated that she was just too tired.

## 2022-06-20 NOTE — HPI
"Stroke code activated on Ms Lux "Doctor B," an 86 year old woman with history of recent subdural hemorrhage (12/21), afib on eliquis, CKD, RA, anemia, HTN, and vascular dementia who presents to the ED with acute onset left sided weakness.     LKN 2300 6/18/2022. Patient notes she woke up multiple times throughout the night (5-6x) but notes no symptoms (0100 06/19/2022). She woke up today at 0830 feeling "off." Reports her joints were more painful and her left sided weakness was greater than normal. Her daughter returned from a trip and reports the patient had difficulty ambulating, transferring from her wheel chair to the toilet, and placing her shoes on which are abnormal for her. When taking the patient to the car, she states "she was placing all of her weight on the right and unable to use the left." The prior day Ms Lux was ambulating with a cane without difficulty per text messages between daughters. She is currently on eliquis; last dose today. Patient had residual left sided weakness following her subdural but has progressed with PT/OT over the past 6 months. 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   "

## 2022-06-20 NOTE — ASSESSMENT & PLAN NOTE
PT/OT recommending inpatient rehab  - patient considering inpatient rehab vs home health vs outpatient rehab

## 2022-06-20 NOTE — ED NOTES
PT at bedside at this time.  Patient's daughter had just left and had taken the patient's belongings with her.

## 2022-06-20 NOTE — PT/OT/SLP EVAL
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  She presents with encouragement needed to participate and pt very labile during session. Pt arouses easily and follows simple commands with delay. Pt is oriented x 4, but with confabulated speech at times. Pt very upset stating therapists always make her feel dumb. Pt calm and receptive of OT returning the next day for continued tx. Performance deficits affecting function: impaired balance, decreased safety awareness, weakness, impaired endurance, impaired cognition, impaired cardiopulmonary response to activity, impaired self care skills, impaired functional mobilty, gait instability, decreased upper extremity function, decreased lower extremity function, impaired fine motor, impaired coordination, decreased coordination, visual deficits.      Rehab Prognosis: Fair; patient would benefit from acute skilled OT services to address these deficits and reach maximum level of function.       Plan:     Patient to be seen   to address the above listed problems via self-care/home management, therapeutic activities, therapeutic exercises, neuromuscular re-education, cognitive retraining, wheelchair management/training  · Plan of Care Expires: 07/20/22  · Plan of Care Reviewed with: patient    Subjective     Chief Complaint: "Ya'll make me feel dumb."  Patient/Family Comments/goals: to go home    Occupational Profile: Per PT note:   Patient lives with their 2 adult children  in single story home, 5 steps " with Bilateral hand rail, tub/shower.   Pt utilizes rolling walker for ambulation of short household distances . Pt states she also uses her wheelchair and is able to self propel until her arms start to hurt  Prior to admission, patient required assist with all ADLs.   DME owned: cane, straight, walker, rolling, wheelchair.   DME not currently used: n/a.   Upon discharge, patient will have assistance from family or staff with 24/7 assist. Pt has a caregiver 5-7 days a week for ~5hours.     Pain/Comfort:  · Pain Rating 1: 0/10  · Pain Rating Post-Intervention 1: 0/10    Patients cultural, spiritual, Adventism conflicts given the current situation: no    Objective:     Communicated with: RN prior to session.  Patient found sitting edge of bed with arterial line, PureWick upon OT entry to room.    General Precautions: Standard, fall   Orthopedic Precautions:    Braces:    Respiratory Status: Room air    Occupational Performance:    Bed Mobility:    · Patient completed Rolling/Turning to Right with moderate assistance and with side rail  · Patient completed Scooting/Bridging with total assistance  · Patient completed Supine to Sit with moderate assistance  · Patient completed Sit to Supine with maximal assistance    Functional Mobility/Transfers:  · NT as pt refusing. Pt very upset and crying sitting EOB  · Functional Mobility: NT    Activities of Daily Living:  · Grooming: maximal assistance    · Upper Body Dressing: maximal assistance    · Lower Body Dressing: maximal assistance      Cognitive/Visual Perceptual:  Pt is alert and following simple commands with delay  Pt with occasional confabulations  No L sided inattention or visual cut noted    Physical Exam:  Postural examination/scapula alignment:    -       Rounded shoulder; PPT  Sensation:    -       Intact LT  B UE   Upper Extremity Range of Motion:     -       Right Upper Extremity: WFL  -       Left Upper Extremity: WFL elbow and below; active shoulder  flexion to ~80*  Upper Extremity Strength:    -       Right Upper Extremity: WFL  -       Left Upper Extremity: grossly 3-/5   Strength:    -       Right Upper Extremity: Fair  -       Left Upper Extremity: WFL  Fine Motor Coordination:    -       Impaired for manipulation of objects in L hand      AMPAC 6 Click ADL:  AMPAC Total Score: 12    Treatment & Education:  Pt ed on OT POC  Daily orientation reiterated  Pt ed on goals of OT and importance of activity participation  Education:    Patient left supine with all lines intact, call button in reach and RN notified    GOALS:   Multidisciplinary Problems     Occupational Therapy Goals        Problem: Occupational Therapy    Goal Priority Disciplines Outcome Interventions   Occupational Therapy Goal     OT, PT/OT Ongoing, Progressing    Description: Goals to be met by: 7/4/22     Patient will increase functional independence with ADLs by performing:    Feeding with Stand-by Assistance.  UE Dressing with Minimal Assistance.  LE Dressing with Moderate Assistance.  Grooming while standing at sink with Minimal Assistance.  Toileting from bedside commode with Moderate Assistance for hygiene and clothing management.   Toilet transfer to bedside commode with Minimal Assistance.                     History:     Past Medical History:   Diagnosis Date    Acute cystitis without hematuria 2/27/2020    Acute hypoxemic respiratory failure 4/11/2018    Acute respiratory failure with hypoxia 3/2/2020    Altered mental status     Anemia     Arthritis     Bilateral hand pain 3/14/2018    Branch retinal vein occlusion, left eye 2/20/2015    Chronic bilateral low back pain without sciatica 3/23/2017    Chronic renal failure in pediatric patient, stage 3 (moderate) 4/15/2018    Cognitive communication deficit 12/19/2017    Cystoid macular edema, left eye 2/20/2015    Cystoid macular edema, left eye 2/20/2015    Delirium 12/30/2021    DJD (degenerative joint disease) of  cervical spine 5/15/2013    Enterococcal bacteremia     Fatty liver 8/26/2014    Goiter 4/9/2018    Hashimoto's disease     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    Hypertension     Hypertensive encephalopathy     IBS (irritable bowel syndrome) 6/21/2017    IGT (impaired glucose tolerance) 1/12/2016    Iron deficiency anemia secondary to inadequate dietary iron intake 6/24/2013    Joint pain     Keratoconjunctivitis sicca of both eyes not specified as Sjogren's 7/29/2016    Leiomyoma of uterus, unspecified 9/16/2014    Long QT interval 6/29/2016    Due to medication (plaquenil)     Macular edema 1/12/2015    Multinodular goiter 1/12/2016    Neuropathy 8/23/2017    Plaquenil causing adverse effect in therapeutic use 10/7/2016    Pneumococcal vaccine refused 8/17/2016    Pneumonia due to Streptococcus pneumoniae 4/5/2018    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/2015    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    s/p Right Total knee replacement 5/15/2013    Scoliosis of thoracic spine 5/15/2013    Small vessel disease, cerebrovascular 12/28/2017    Stroke     UTI (urinary tract infection) 12/29/2018    Vascular dementia 12/6/2017       Past Surgical History:   Procedure Laterality Date    BREAST CYST EXCISION      CATARACT EXTRACTION      COLONOSCOPY N/A 9/29/2015    Procedure: COLONOSCOPY;  Surgeon: FIDELINA Sanchez MD;  Location: [facility] ([address] [place] [address]);  Service: Endoscopy;  Laterality: N/A;    EYE SURGERY      INJECTION OF ANESTHETIC AGENT AROUND NERVE Left 4/19/2021    Procedure: BLOCK, NERVE, FEMORAL AND OBTURATOR;  Surgeon: Shivam Gonzalez MD;  Location: [facility];  Service: Pain Management;  Laterality: Left;  consent needed    JOINT REPLACEMENT      right knee    KNEE SURGERY Left 12/31/2013    TKR    left parotidectomy      mixed tumor    FL EVAL,SWALLOW FUNCTION,CINE/VIDEO RECORD  6/5/2021         SALIVARY GLAND SURGERY      TONSILLECTOMY      UPPER GASTROINTESTINAL  ENDOSCOPY         Time Tracking:     OT Date of Treatment: 06/20/22  OT Start Time: 1010  OT Stop Time: 1045  OT Total Time (min): 35 min    Billable Minutes:Evaluation 10  Therapeutic Activity 25    6/20/2022

## 2022-06-20 NOTE — PLAN OF CARE
Problem: Occupational Therapy  Goal: Occupational Therapy Goal  Description: Goals to be met by: 7/4/22     Patient will increase functional independence with ADLs by performing:    Feeding with Stand-by Assistance.  UE Dressing with Minimal Assistance.  LE Dressing with Moderate Assistance.  Grooming while standing at sink with Minimal Assistance.  Toileting from bedside commode with Moderate Assistance for hygiene and clothing management.   Toilet transfer to bedside commode with Minimal Assistance.    Outcome: Ongoing, Progressing

## 2022-06-20 NOTE — ASSESSMENT & PLAN NOTE
86 y.o. female with PMHx prior stroke, AFib (on eliquis), HTN, HLD and TIA who presents to [facility] ED with left sided weakness. LKN 2300 6/18/2022. On evaluation patient is hypotensive with left sided hemiparesis. NIHSS 4. Labs CBC/CMP benign; mild Cr increase from priors. Imaging reviewed; CTA limited by contrast extravasation. CTH prelim read with hypodensity in MCA distribution, however, similar to prior CTH films. CTA without LVO. tPA not given as patient OOW and on anticoagulation. Ordered MRI brain acute ischemic protocol for further evaluation as patient is within thrombectomy window. MRI brain with small linear area of diffusion restriction along the right coronary radiata, suggestive of acute infarct. Stroke Etiology: Ischemic Small Vessel Disease (Lacunar). PT/OT recommending inpatient rehab; patient notes poor therapeutic relationships in the past and is considering outpatient rehab vs home health. Will discuss further.    Antithrombotics for secondary stroke prevention: Anticoagulants: Apixaban 5 mg BID     Statins for secondary stroke prevention and hyperlipidemia, if present:   Statins: Atorvastatin- 40 mg daily    Aggressive risk factor modification: HTN, HLD, Diet, Exercise, A-Fib     Rehab efforts: The patient has been evaluated by a stroke team provider and the therapy needs have been fully considered based off the presenting complaints and exam findings. The following therapy evaluations are needed: PT evaluate and treat, OT evaluate and treat, SLP evaluate and treat, PM&R evaluate for appropriate placement    Diagnostics ordered/pending: HgbA1C to assess blood glucose levels, Lipid Profile to assess cholesterol levels, MRI head without contrast to assess brain parenchyma, TTE to assess cardiac function/status , TSH to assess thyroid function    VTE prophylaxis: None: Reason for No Pharmacological VTE Prophylaxis: Currently on anticoagulation    BP parameters: Infarct: No intervention, SBP <220

## 2022-06-20 NOTE — CONSULTS
[facility] - Emergency Dept  Vascular Neurology  [facility]  Consult Note    Inpatient consult to Vascular (Stroke) Neurology  Consult performed by: Cliff Monson MD  Consult ordered by: Rob Guzmán MD        Assessment/Plan:     Patient is a 86 y.o. 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 female with PMHx prior stroke, AFib (on eliquis), HTN, HLD and TIA who presents to [facility] ED with left sided weakness. LKN 2300 6/18/2022. On evaluation patient is hypotensive with left sided hemiparesis. NIHSS 4. Labs CBC/CMP benign; mild Cr increase from priors. Imaging reviewed; CTA limited by contrast extravasation. CTH prelim read with hypodensity in MCA distribution, however, similar to prior CTH films. CTA without LVO. tPA not given as patient OOW and on anticoagulation. Ordered MRI brain acute ischemic protocol for further evaluation as patient is within thrombectomy window.     Differentials include recrudescence vs ischemic stroke. Etiologies include, Ischemic Cardioembolic 2/2 Atrial fibrillation, Ischemic Possible Small Artery Occlusion (MILIND). More likely small artery due to pure motor symptoms. MRI pending    Antithrombotics for secondary stroke prevention: Anticoagulants: Apixaban 5 mg BID     Statins for secondary stroke prevention and hyperlipidemia, if present:   Statins: Atorvastatin- 40 mg daily    Aggressive risk factor modification: HTN, HLD, Diet, Exercise, A-Fib     Rehab efforts: The patient has been evaluated by a stroke team provider and the therapy needs have been fully considered based off the presenting complaints and exam findings. The following therapy evaluations are needed: PT evaluate and treat, OT evaluate and treat, SLP evaluate and treat, PM&R evaluate for appropriate placement    Diagnostics ordered/pending: HgbA1C to assess blood glucose levels, Lipid Profile to assess cholesterol levels, MRI head without  contrast to assess brain parenchyma, TTE to assess cardiac function/status , TSH to assess thyroid function    VTE prophylaxis: None: Reason for No Pharmacological VTE Prophylaxis: Currently on anticoagulation    BP parameters: Infarct: No intervention, SBP <220; re-assess if patient a thrombectomy candidate.        AF (paroxysmal atrial fibrillation)  Stroke risk factor  On eliquis  EKG 06/19/2022 NSR  Not a candidate for tPA    Hyperlipidemia  Stroke risk factor  Lipid panel pending  Continue atorvastatin 40 mg QD    Hypertension, essential  Stroke risk factor  Workup on going  No hemorrhage on CTA  Recommend permissive HTN     Age-related physical debility  PT/OT    Generalized weakness  PT/OT      STROKE DOCUMENTATION     Acute Stroke Times   Last Known Normal Date: 06/18/22  Last Known Normal Time: 2300  Symptom Onset Date: 06/19/22  Symptom Onset Time: 0830  Stroke Team Called Date: 06/19/22  Stroke Team Called Time: 1950  Stroke Team Arrival Date: 06/19/22  Stroke Team Arrival Time: 1955  CT Interpretation Time: 2015  Alteplase Recommended: No  CTA Interpretation Time: 2020    NIH Scale:  1a. Level of Consciousness: 0-->Alert, keenly responsive  1b. LOC Questions: 0-->Answers both questions correctly  1c. LOC Commands: 0-->Performs both tasks correctly  2. Best Gaze: 0-->Normal  3. Visual: 0-->No visual loss  4. Facial Palsy: 0-->Normal symmetrical movements  5a. Motor Arm, Left: 2-->Some effort against gravity, limb cannot get to or maintain (if cued) 90 (or 45) degrees, drifts down to bed, but has some effort against gravity  5b. Motor Arm, Right: 0-->No drift, limb holds 90 (or 45) degrees for full 10 secs  6a. Motor Leg, Left: 2-->Some effort against gravity, leg falls to bed by 5 secs, but has some effort against gravity  6b. Motor Leg, Right: 0-->No drift, leg holds 30 degree position for full 5 secs  7. Limb Ataxia: 0-->Absent  8. Sensory: 0-->Normal, no sensory loss  9. Best Language: 0-->No aphasia,  "normal  10. Dysarthria: 0-->Normal  11. Extinction and Inattention (formerly Neglect): 0-->No abnormality  Total (NIH Stroke Scale): 4    Modified Enrike Score: 2  Marion Coma Scale:15   ABCD2 Score:    [id number] Score:8  HAS -BLED Score:   ICH Score:   Hunt & Jain Classification:       Thrombolysis Candidate? No, Out of window , Current use of direct thrombin inhibitors (dabigatran) or direct factor Xa inhibitors (rivaroxaban, apixaban, edoxaban) with elevated sensitive laboratory tests     Delays to Thrombolysis?  Not Applicable    Interventional Revascularization Candidate?   Is the patient eligible for mechanical endovascular reperfusion (ANALI)?  Awaiting CTA/MRI Brain Ischemic protocol results    Delays to Thrombectomy? Not Applicable    Hemorrhagic change of an Ischemic Stroke: Does this patient have an ischemic stroke with hemorrhagic changes? No     Subjective:     History of Present Illness:  Stroke code activated on Ms Lux "Doctor B," an 86 year old woman with history of recent subdural hemorrhage (12/21), afib on eliquis, CKD, RA, anemia, HTN, and vascular dementia who presents to the ED with acute onset left sided weakness.     LKN 2300 6/18/2022. Patient notes she woke up multiple times throughout the night (5-6x) but notes no symptoms (0100 06/19/2022). She woke up today at 0830 feeling "off." Reports her joints were more painful and her left sided weakness was greater than normal. Her daughter returned from a trip and reports the patient had difficulty ambulating, transferring from her wheel chair to the toilet, and placing her shoes on which are abnormal for her. When taking the patient to the car, she states "she was placing all of her weight on the right and unable to use the left." The prior day Ms Lux was ambulating with a cane without difficulty per text messages between daughters. She is currently on eliquis; last dose today. Patient had residual left sided weakness following her " subdural but has progressed with PT/OT over the past 6 months. 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           Past Medical History:   Diagnosis Date    Acute cystitis without hematuria 2/27/2020    Acute hypoxemic respiratory failure 4/11/2018    Acute respiratory failure with hypoxia 3/2/2020    Altered mental status     Anemia     Arthritis     Bilateral hand pain 3/14/2018    Branch retinal vein occlusion, left eye 2/20/2015    Chronic bilateral low back pain without sciatica 3/23/2017    Chronic renal failure in pediatric patient, stage 3 (moderate) 4/15/2018    Cognitive communication deficit 12/19/2017    Cystoid macular edema, left eye 2/20/2015    Cystoid macular edema, left eye 2/20/2015    Delirium 12/30/2021    DJD (degenerative joint disease) of cervical spine 5/15/2013    Enterococcal bacteremia     Fatty liver 8/26/2014    Goiter 4/9/2018    Hashimoto's disease     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    Hypertension     Hypertensive encephalopathy     IBS (irritable bowel syndrome) 6/21/2017    IGT (impaired glucose tolerance) 1/12/2016    Iron deficiency anemia secondary to inadequate dietary iron intake 6/24/2013    Joint pain     Keratoconjunctivitis sicca of both eyes not specified as Sjogren's 7/29/2016    Leiomyoma of uterus, unspecified 9/16/2014    Long QT interval 6/29/2016    Due to medication (plaquenil)     Macular edema 1/12/2015    Multinodular goiter 1/12/2016    Neuropathy 8/23/2017    Plaquenil causing adverse effect in therapeutic use 10/7/2016    Pneumococcal vaccine refused 8/17/2016    Pneumonia due to Streptococcus pneumoniae 4/5/2018    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/2015    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    s/p Right Total knee replacement 5/15/2013    Scoliosis of thoracic spine 5/15/2013    Small vessel disease, cerebrovascular 12/28/2017    Stroke     UTI (urinary tract infection) 12/29/2018    Vascular  dementia 12/6/2017     Past Surgical History:   Procedure Laterality Date    BREAST CYST EXCISION      CATARACT EXTRACTION      COLONOSCOPY N/A 9/29/2015    Procedure: COLONOSCOPY;  Surgeon: FIDELINA Sanchez MD;  Location: [facility] ENDO (4TH FLR);  Service: Endoscopy;  Laterality: N/A;    EYE SURGERY      INJECTION OF ANESTHETIC AGENT AROUND NERVE Left 4/19/2021    Procedure: BLOCK, NERVE, FEMORAL AND OBTURATOR;  Surgeon: Shivam Gonzalez MD;  Location: [facility] PAIN MGT;  Service: Pain Management;  Laterality: Left;  consent needed    JOINT REPLACEMENT      right knee    KNEE SURGERY Left 12/31/2013    TKR    left parotidectomy      mixed tumor    CO EVAL,SWALLOW FUNCTION,CINE/VIDEO RECORD  6/5/2021         SALIVARY GLAND SURGERY      TONSILLECTOMY      UPPER GASTROINTESTINAL ENDOSCOPY       Family History   Problem Relation Age of Onset    Hypertension Mother     Heart disease Mother     Hyperthyroidism Mother     Prostate cancer Father         prostate cancer    Cancer Father     Breast cancer Maternal Grandmother     Hyperthyroidism Other     Colon cancer Neg Hx      Social History     Tobacco Use    Smoking status: Never Smoker    Smokeless tobacco: Never Used   Substance Use Topics    Alcohol use: Yes     Alcohol/week: 0.0 standard drinks     Comment: very seldom     Drug use: No     Review of patient's allergies indicates:  No Known Allergies    Medications: I have reviewed the current medication administration record.    (Not in a hospital admission)      Review of Systems   Constitutional:  Positive for activity change and fatigue. Negative for fever.   HENT:  Negative for facial swelling and hearing loss.    Eyes:  Negative for pain, discharge and visual disturbance.   Respiratory:  Negative for cough and shortness of breath.    Cardiovascular:  Negative for chest pain and palpitations.   Gastrointestinal:  Negative for abdominal pain and nausea.   Musculoskeletal:  Positive for arthralgias.  Negative for neck pain.   Skin:  Negative for color change and rash.   Neurological:  Positive for weakness. Negative for facial asymmetry, speech difficulty and numbness.   Psychiatric/Behavioral:  Negative for agitation and confusion.    Objective:     Vital Signs (Most Recent):  Temp: 98 °F (36.7 °C) (06/19/22 1946)  Pulse: 91 (06/19/22 1946)  Resp: 16 (06/19/22 1946)  BP: (!) 109/58 (06/19/22 1946)  SpO2: 98 % (06/19/22 1946)    Vital Signs Range (Last 24H):  Temp:  [98 °F (36.7 °C)]   Pulse:  [91]   Resp:  [16]   BP: (109)/(58)   SpO2:  [98 %]     Physical Exam  Vitals reviewed.   Constitutional:       General: She is not in acute distress.     Appearance: Normal appearance. She is normal weight. She is not ill-appearing.   HENT:      Head: Normocephalic and atraumatic.      Mouth/Throat:      Mouth: Mucous membranes are moist.      Pharynx: Oropharynx is clear.   Eyes:      General:         Right eye: No discharge.         Left eye: No discharge.      Extraocular Movements: Extraocular movements intact.      Conjunctiva/sclera: Conjunctivae normal.      Pupils: Pupils are equal, round, and reactive to light.   Cardiovascular:      Rate and Rhythm: Normal rate.   Neurological:      Mental Status: She is alert.       Neurological Exam:   LOC: alert  Attention Span: Good   Language: No aphasia  Articulation: No dysarthria  Orientation: Person, Place, Time   Visual Fields: Full  EOM (CN III, IV, VI): Full/intact  Pupils (CN II, III): PERRL  Facial Sensation (CN V): Normal  Facial Movement (CN VII): Symmetric facial expression    Motor: Arm left  Paresis: 3-/5  Leg left  Paresis: 3-/5  Arm right  Normal 5/5  Leg right Normal 5/5  Cerebellum: No evidence of appendicular or axial ataxia, cannot perform on left due to weakness  Sensation: Intact to light touch and temperature      Laboratory:  CMP: No results for input(s): GLUCOSE, CALCIUM, ALBUMIN, PROT, NA, K, CO2, CL, BUN, CREATININE, ALKPHOS, ALT, AST, BILITOT in  the last 24 hours.  CBC:   Recent Labs   Lab 06/19/22 1958   HCT 36     Lipid Panel: No results for input(s): CHOL, LDLCALC, HDL, TRIG in the last 168 hours.  Coagulation: No results for input(s): PT, INR, APTT in the last 168 hours.  Hgb A1C: No results for input(s): HGBA1C in the last 168 hours.  TSH: No results for input(s): TSH in the last 168 hours.    Diagnostic Results:    Brain imaging:  MRI Brain acute ischemic protocol    Vessel Imaging:  CTA official read pending    Cardiac Evaluation:   EKG 06/19/2022  NSR    Previous ECHO  Transthoracic echo (TTE) complete 01/27/2022    Interpretation Summary  · The left ventricle is normal in size with concentric remodeling and normal systolic function. The estimated ejection fraction is 55%.  · Normal right ventricular size with normal right ventricular systolic function.  · Normal left ventricular diastolic function.  · Mild mitral regurgitation.  · No evidence of intracardiac vegetation          Cliff Monson MD  [facility]  Department of Vascular Neurology   [facility] Dept

## 2022-06-20 NOTE — CONSULTS
Inpatient consult to Physical Medicine Rehab  Consult performed by: Frances Vazquez NP  Consult ordered by: Lissa Moran, JAVIER, NP  Reason for consult: Assess rehab needs      Reviewed patient history and current admission.  Rehab team following.  Full consult to follow once closer to medical stability and able to participate with therapy.    MENDOZA Kruger, FNP-C  Physical Medicine & Rehabilitation   06/20/2022

## 2022-06-20 NOTE — PT/OT/SLP EVAL
Physical Therapy  Evaluation and Treatment    Patient Name:  Selma Lux   MRN:  [id number]    Recommendations:     Discharge Recommendations:  rehabilitation facility   Discharge Equipment Recommendations: other (see comments) (TBD)   Barriers to discharge: decreased functional mobility, fall risk, decreased caregiver support and inaccessible home    Assessment:     Selma Lux is a 86 y.o. 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  Pt demonstrates the below listed impairments with decreased tolerance to functional mobility, weakness and impaired cognition being the most limiting.  Pt demonstrates fair tolerance to edge of bed mobility however requires maximal encouragement to take part in the session.  Pt noted to be generally weak in her L LE however patient wishes to end the session before full PT examination can be completed.  Pt is not safe for home discharge at this time due to patient's status as: a fall risk and cognitively impaired and requires skilled PT.      Impairments and functional limitations:  weakness, impaired endurance, impaired self care skills, impaired functional mobilty, gait instability, impaired balance, visual deficits, impaired cognition, decreased coordination, decreased upper extremity function, decreased lower extremity function, decreased safety awareness, impaired coordination, impaired fine motor.  These deficits affect their roles and responsibilities in which they were able to complete prior to admit.  Rehab Prognosis:   Fair; patient would benefit from acute skilled PT services 4 x/week to address these deficits, improve quality of life, focus on recovery of impairments, provide patient/caregiver education, reduce fall risk, and reach maximum level of function.  Pt is moderately motivated to participated in skilled PT.    Recent Surgery:   * No surgery found *      Plan:     During this  "hospitalization, patient to be seen 4 x/week to address the identified rehab impairments via gait training, therapeutic activities, therapeutic exercises, neuromuscular re-education, wheelchair management/training and progress toward the following goals:    · Plan of Care Expires:  07/20/22    Subjective     Chief Complaint: "I've been wheeled around, I'm tired"  Patient/Family Comments/Goals: Progress to inpatient rehab  Pain/Comfort:  · Pain Rating 1: other (see comments) (none rated)    Patients cultural, spiritual, Ep[employer] conflicts given the current situation: no    Living Environment:  Patient lives with their 2 adult children  in single story home, 5 steps with Bilateral hand rail, tub/shower.   Pt utilizes rolling walker for ambulation of short household distances . Pt states she also uses her wheelchair and is able to self propel until her arms start to hurt  Prior to admission, patient required assist with all ADLs.   DME owned: cane, straight, walker, rolling, wheelchair.   DME not currently used: n/a.   Upon discharge, patient will have assistance from family or staff with 24/7 assist. Pt has a caregiver 5-7 days a week for ~5hours.     Objective:     Communicated with nursing prior to session.  Patient found HOB elevated with telemetry, PureWick, pulse ox (continuous)  upon PT entry to room.    General Precautions: Standard, fall   Orthopedic Precautions:N/A   Braces: N/A   Oxygen Device:      Exams:  · Cognitive Exam:  Patient is oriented to Person, Place and Time  · Command following: Patient follows majority  of commands  · RLE ROM: WFL  · RLE Strength: Hip flex.         3+/5  · Knee flex.      3-/5  · Knee ext.       4/5  ·  Ankle plan.   4+/5  · Ankle dorsi.   4-/5  · LLE ROM: WFL  · LLE Strength: Hip flex.         3-/5  · Knee flex.      0/5  · Knee ext.       4/5  ·  Ankle plan.   1/5  · Ankle dorsi.   0/5  · Postural Exam:  Patient presented with the following abnormalities:    · -       " Rounded shoulders  · -       Forward head  · Sensation:  unable to test, session ended by patient     Functional Mobility:  · Bed Mobility:  Rolling Left: minimum assistance  · Scooting: moderate assistance  · Supine to Sit: moderate assistance for LE management and trunk management  · Sit to Supine: maximal assistance for LE management and trunk management  · Scooting up to head of bed in supine: total assistance for LE management and trunk management  · Head of bed position: HOB elevated    · Transfers:  Sit to Stand: minimum assistance with hand-held assist with cues for hand placement and foot placement   · Pt required minimum assist however she reaches 70% of the way to standing, B LE resting on side of bed.  Pt unwilling to allow PT to attempt a second stand     · Gait: n/a, pt not safe for ambulation    · Balance:   Position Score Time   Static Sitting FAIR-: Maintains without assist but is inconsistent 2 minute(s)   Dynamic Sitting POOR+: MINIMAL assist to maintain 3 minute(s)   Static Standing POOR+: MINIMAL assist to maintain ~8 seconds   Dynamic Standing n/a: dependent n/a       Therapeutic Activities:  Patient educated on role of acute care PT and PT POC, safety while in hospital including calling nurse for mobility, call light usage, benefits of out of bed mobility, breathing technique, fall risk, bed mobility , transfers, positioning, posture, risks of prolonged bed rest, possible discharge disposition  and benefits of continued PT by explanation and demonstration.    Patient demonstrates poor understanding of education provided this day.       Therapeutic Exercises:  n/a    AM-PAC 6 CLICK MOBILITY  Total Score:11     Patient left HOB elevated with all lines intact, call button in reach and RN notified.    GOALS:   Multidisciplinary Problems     Physical Therapy Goals        Problem: Physical Therapy    Goal Priority Disciplines Outcome Goal Variances Interventions   Physical Therapy Goal     PT, PT/OT  Ongoing, Progressing     Description: Goals to be met by: 22    Patient will increase functional independence with mobility by performin. Supine to sit with supervision  2. Sit to supine with supervision  3. Rolling to Left and Right with supervision  4. Sit to stand transfer with minimum assistance  5. Bed to chair transfer with minimum assistance using LRAD as needed  6. Gait  x 10 feet with minimum assistance using LRAD as needed  7. Wheelchair propulsion x25 feet with contact guard assistance using bilateral uppper extremities  8. Ascend/descend 5 stair with bilateral handrails moderate assistance using LRAD as needed  9. Lower extremity exercise program x10 reps per handout, with supervision                     History:     Past Medical History:   Diagnosis Date    Acute cystitis without hematuria 2020    Acute hypoxemic respiratory failure 2018    Acute respiratory failure with hypoxia 3/2/2020    Altered mental status     Anemia     Arthritis     Bilateral hand pain 3/14/2018    Branch retinal vein occlusion, left eye 2015    Chronic bilateral low back pain without sciatica 3/23/2017    Chronic renal failure in pediatric patient, stage 3 (moderate) 4/15/2018    Cognitive communication deficit 2017    Cystoid macular edema, left eye 2015    Cystoid macular edema, left eye 2015    Delirium 2021    DJD (degenerative joint disease) of cervical spine 5/15/2013    Enterococcal bacteremia     Fatty liver 2014    Goiter 2018    Hashimoto's disease     Hemichorea 2017    Hypertension     Hypertensive encephalopathy     IBS (irritable bowel syndrome) 2017    IGT (impaired glucose tolerance) 2016    Iron deficiency anemia secondary to inadequate dietary iron intake 2013    Joint pain     Keratoconjunctivitis sicca of both eyes not specified as Sjogren's 2016    Leiomyoma of uterus, unspecified 2014    Long  QT interval 6/29/2016    Due to medication (plaquenil)     Macular edema 1/12/2015    Multinodular goiter 1/12/2016    Neuropathy 8/23/2017    Plaquenil causing adverse effect in therapeutic use 10/7/2016    Pneumococcal vaccine refused 8/17/2016    Pneumonia due to Streptococcus pneumoniae 4/5/2018    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/2015    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  Service: Endoscopy;  Laterality: N/A;    EYE SURGERY      INJECTION OF ANESTHETIC AGENT AROUND NERVE Left 4/19/2021    Procedure: BLOCK, NERVE, FEMORAL AND OBTURATOR;  Surgeon: Shivam Gonzalez MD;  Location: [facility];  Service: Pain Management;  Laterality: Left;  consent needed    JOINT REPLACEMENT      right knee    KNEE SURGERY Left 12/31/2013    TKR    left parotidectomy      mixed tumor    MS EVAL,SWALLOW FUNCTION,CINE/VIDEO RECORD  6/5/2021         SALIVARY GLAND SURGERY      TONSILLECTOMY      UPPER GASTROINTESTINAL ENDOSCOPY         Time Tracking:     PT Received On: 06/20/22  PT Start Time: 1119     PT Stop Time: 1135  PT Total Time (min): 16 min     Billable Minutes: Evaluation 8 and Therapeutic Activity 8    06/20/2022

## 2022-06-20 NOTE — PT/OT/SLP EVAL
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    Recommendations:                 General Recommendations:  Speech language evaluation and Cognitive-linguistic evaluation  Diet recommendations:  Regular, Thin   Aspiration Precautions: Standard aspiration precautions   General Precautions: Standard, fall  Communication strategies:  none    History:     Past Medical History:   Diagnosis Date    Acute cystitis without hematuria 2/27/2020    Acute hypoxemic respiratory failure 4/11/2018    Acute respiratory failure with hypoxia 3/2/2020    Altered mental status     Anemia     Arthritis     Bilateral hand pain 3/14/2018    Branch retinal vein occlusion, left eye 2/20/2015    Chronic bilateral low back pain without sciatica 3/23/2017    Chronic renal failure in pediatric patient, stage 3 (moderate) 4/15/2018    Cognitive communication deficit 12/19/2017    Cystoid macular edema, left eye 2/20/2015    Cystoid macular edema, left eye 2/20/2015    Delirium 12/30/2021    DJD (degenerative joint disease) of cervical spine 5/15/2013    Enterococcal bacteremia     Fatty liver 8/26/2014    Goiter 4/9/2018    Hashimoto's disease     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    Hypertension     Hypertensive encephalopathy     IBS (irritable bowel syndrome) 6/21/2017    IGT (impaired glucose tolerance) 1/12/2016    Iron deficiency anemia secondary to inadequate dietary iron intake 6/24/2013    Joint pain     Keratoconjunctivitis sicca of both eyes not specified as Sjogren's 7/29/2016    Leiomyoma of uterus, unspecified 9/16/2014    Long QT interval 6/29/2016    Due to medication (plaquenil)     Macular edema 1/12/2015    Multinodular goiter 1/12/2016    Neuropathy 8/23/2017    Plaquenil causing adverse effect in therapeutic use 10/7/2016    Pneumococcal vaccine  refused 8/17/2016    Pneumonia due to Streptococcus pneumoniae 4/5/2018    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/2015    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 ENDO (4TH FLR);  Service: Endoscopy;  Laterality: N/A;    EYE SURGERY      INJECTION OF ANESTHETIC AGENT AROUND NERVE Left 4/19/2021    Procedure: BLOCK, NERVE, FEMORAL AND OBTURATOR;  Surgeon: Shivam Gonzalez MD;  Location: [facility] PAIN MGT;  Service: Pain Management;  Laterality: Left;  consent needed    JOINT REPLACEMENT      right knee    KNEE SURGERY Left 12/31/2013    TKR    left parotidectomy      mixed tumor    DE EVAL,SWALLOW FUNCTION,CINE/VIDEO RECORD  6/5/2021         SALIVARY GLAND SURGERY      TONSILLECTOMY      UPPER GASTROINTESTINAL ENDOSCOPY       Prior Intubation HX:  None this admission    Modified Barium Swallow: None on file    Chest X-Rays: None this admission    Prior diet: reg/thin    Subjective     Patient awake and alert  Communicated with nurse prior to session     Pain/Comfort:  · Pain Rating 1: 0/10  · Pain Rating Post-Intervention 1: 0/10    Respiratory Status: Room air    Objective:     Oral Musculature Evaluation  · Oral Musculature: WFL  · Dentition: present and adequate  · Secretion Management: adequate  · Mucosal Quality: good  · Mandibular Strength and Mobility: WFL  · Oral Labial Strength and Mobility: WFL  · Lingual Strength and Mobility: WFL  · Velar Elevation: WFL  · Volitional Cough: adequate  · Volitional Swallow: timely; good laryngeal elevation  · Voice  Prior to PO Intake: WFL    Bedside Swallow Eval:   Consistencies Assessed:  · Thin liquids x5 (via straw)  · Solids x4     Oral Phase:   · WFL    Pharyngeal Phase:   · no overt clinical signs/symptoms of aspiration  · no overt clinical signs/symptoms of pharyngeal dysphagia    Compensatory Strategies  · None    Treatment: Patient sitting up in bed eating lunch during session. She reported no ST deficits, but ST to f/u for a speech/language/cog eval. SLP educated patient regarding recs. Patient left in bed with call light within reach.     Assessment:     Selma Lux is a 86 y.o. female who presents with a safe oropharyngeal swallow. ST to f/u for a speech/language/cog eval.     Goals:   Multidisciplinary Problems     SLP Goals        Problem: SLP    Goal Priority Disciplines Outcome   SLP Goal     SLP    Description: Speech-Language Pathology Goals  Goals to be met by 6/27/22  1. Patient will participate in ongoing swallow assessment in order to determine safest least restrictive diet.   2. Patient will participate in a speech/language/cog eval.                      Plan:     · Patient to be seen:  3 x/week   · Plan of Care expires:  07/20/22  · Plan of Care reviewed with:  patient   · SLP Follow-Up:  Yes       Discharge recommendations:  rehabilitation facility   Barriers to Discharge:  None    Time Tracking:     SLP Treatment Date:   06/20/22  Speech Start Time:  1236  Speech Stop Time:  1244     Speech Total Time (min):  8 min    Billable Minutes: Eval Swallow and Oral Function 8    Porfirio Khoury CCC-SLP  Speech-Language Pathology  Pager: [phone]   06/20/2022

## 2022-06-20 NOTE — ASSESSMENT & PLAN NOTE
86 y.o. female with PMHx prior stroke, AFib (on eliquis), HTN, HLD and TIA who [place] [facility] [place] sided weakness. LKN 2300 6/18/2022. On evaluation patient is hypotensive with left sided hemiparesis. NIHSS 4. Labs CBC/CMP benign; mild Cr increase from priors. Imaging reviewed; CTA limited by contrast extravasation. CTH prelim read with hypodensity in MCA distribution, however, similar to prior CTH films. CTA without LVO; decreased flow through right MCA. tPA not given as patient OOW and on anticoagulation. Ordered MRI brain acute ischemic protocol for further evaluation as patient is within thrombectomy window.     Differentials include recrudescence vs ischemic stroke. Workup ongoing.     Antithrombotics for secondary stroke prevention: Anticoagulants: Apixaban 5 mg BID     Statins for secondary stroke prevention and hyperlipidemia, if present:   Statins: Atorvastatin- 40 mg daily    Aggressive risk factor modification: HTN, HLD, Diet, Exercise, A-Fib     Rehab efforts: The patient has been evaluated by a stroke team provider and the therapy needs have been fully considered based off the presenting complaints and exam findings. The following therapy evaluations are needed: PT evaluate and treat, OT evaluate and treat, SLP evaluate and treat, PM&R evaluate for appropriate placement    Diagnostics ordered/pending: HgbA1C to assess blood glucose levels, Lipid Profile to assess cholesterol levels, MRI head without contrast to assess brain parenchyma, TTE to assess cardiac function/status , TSH to assess thyroid function    VTE prophylaxis: None: Reason for No Pharmacological VTE Prophylaxis: Currently on anticoagulation    BP parameters: Infarct: No intervention, SBP <220; re-assess if patient a thrombectomy candidate.

## 2022-06-20 NOTE — SUBJECTIVE & OBJECTIVE
Neurologic Chief Complaint: Lacunar stroke    Subjective:     Interval History: Patient is seen for follow-up neurological assessment and treatment recommendations:     No acute or concerning events noted by overnight staff. Vital signs are stable and within normal limits. Patient is conscious and comfortable. MRI notable for small linear area of diffusion restriction along the right coronary radiata, suggestive of acute infarct. No large vessel occlusion. Pending transfer to NPU floor for PT/OT evaluation. Mild improvement in left sided weakness, however, still significantly weak.    HPI, Past Medical, Family, and Social History remains the same as documented in the initial encounter.     Review of Systems   Constitutional:  Positive for activity change and fatigue. Negative for fever.   HENT:  Negative for facial swelling and hearing loss.    Eyes:  Negative for pain, discharge and visual disturbance.   Respiratory:  Negative for cough and shortness of breath.    Cardiovascular:  Negative for chest pain and palpitations.   Gastrointestinal:  Negative for abdominal pain and nausea.   Musculoskeletal:  Positive for arthralgias. Negative for neck pain.   Skin:  Negative for color change and rash.   Neurological:  Positive for weakness. Negative for facial asymmetry, speech difficulty and numbness.   Psychiatric/Behavioral:  Negative for agitation and confusion.      Scheduled Meds:   apixaban  5 mg Oral BID    atorvastatin  40 mg Oral Daily    onabotulinumtoxina  200 Units Intramuscular Q90 Days     Continuous Infusions:   sodium chloride 0.9%       PRN Meds:acetaminophen, labetaloL, lorazepam, sodium chloride 0.9%, sodium chloride 0.9%    Objective:     Vital Signs (Most Recent):  Temp: 98.1 °F (36.7 °C) (06/20/22 1110)  Pulse: 82 (06/20/22 1110)  Resp: 17 (06/20/22 1110)  BP: 139/65 (06/20/22 1110)  SpO2: 99 % (06/20/22 1110)  BP Location: Right arm    Vital Signs Range (Last 24H):  Temp:  [98 °F (36.7 °C)-98.1 °F  (36.7 °C)]   Pulse:  [78-91]   Resp:  [16-20]   BP: (109-150)/()   SpO2:  [95 %-100 %]   BP Location: Right arm    Physical Exam  Vitals reviewed.   Constitutional:       General: She is not in acute distress.     Appearance: Normal appearance. She is normal weight. She is not ill-appearing.   HENT:      Head: Normocephalic and atraumatic.      Mouth/Throat:      Mouth: Mucous membranes are moist.      Pharynx: Oropharynx is clear.   Eyes:      General:         Right eye: No discharge.         Left eye: No discharge.      Extraocular Movements: Extraocular movements intact.      Conjunctiva/sclera: Conjunctivae normal.      Pupils: Pupils are equal, round, and reactive to light.   Cardiovascular:      Rate and Rhythm: Normal rate.   Pulmonary:      Effort: Pulmonary effort is normal. No respiratory distress.   Neurological:      Mental Status: She is alert.      Motor: Weakness present.       Neurological Exam:   LOC: alert  Attention Span: Good   Language: No aphasia  Articulation: No dysarthria  Orientation: Person, Place, Time   Visual Fields: Full  EOM (CN III, IV, VI): Full/intact  Pupils (CN II, III): PERRL  Facial Sensation (CN V): Normal  Facial Movement (CN VII): Symmetric facial expression    Motor: Arm left  Paresis: 3/5; extensor weakness  Leg left  Paresis: 3-/5; flexor weakness  Arm right  Normal 5/5  Leg right Normal 5/5  Cerebellum: No evidence of appendicular or axial ataxia, cannot perform on left due to weakness  Sensation: Intact to light touch and temperature    Laboratory:  CMP:   Recent Labs   Lab 06/20/22  0453   CALCIUM 9.0   ALBUMIN 3.6   PROT 6.5      K 4.5   CO2 24      BUN 13   CREATININE 0.8   ALKPHOS 62   ALT 9*   AST 10   BILITOT 1.0     CBC:   Recent Labs   Lab 06/20/22  0453   WBC 5.60   RBC 4.29   HGB 10.3*   HCT 33.6*   *   MCV 78*   MCH 24.0*   MCHC 30.7*     Lipid Panel:   Recent Labs   Lab 06/19/22 2045   CHOL 120   LDLCALC 45.0*   HDL 65   TRIG 50      Coagulation:   Recent Labs   Lab 06/20/22  0453   INR 1.1   APTT 23.4     Platelet Aggregation Study: No results for input(s): PLTAGG, PLTAGINTERP, PLTAGREGLACO, ADPPLTAGGREG in the last 168 hours.  Hgb A1C:   Recent Labs   Lab 06/20/22  0226   HGBA1C 5.3     TSH:   Recent Labs   Lab 06/19/22  2045   TSH 2.565       Diagnostic Results     Brain Imaging   MRI Brain Ischemic Inter Pro Incl MRA W/O Con 06/19/2022    Narrative  EXAMINATION:  MRI BRAIN ISCHEMIC INTERVENTIONAL PROTOCOL INCL MRA W/O CONTRAST    CLINICAL HISTORY:  CVA;    TECHNIQUE:  Multiplanar multisequence MR imaging of the brain was performed without the use of intravenous contrast.    Obtained via the same acquisition from the MRI brain, a time-of-flight MR arteriogram of the intracranial vasculature with multiple MIPS reconstructions.    Images were obtained there brain ischemic protocol.    COMPARISON:  CTA 06/19/2022    FINDINGS:  Technically limited exam due to unavailability of MRI head coil.    Brain:    Ventricles and sulci are stable in size and configuration.  Redemonstration of stable right subdural collection.    Linear area of diffusion restriction along the right corona radiata suggestive of acute infarct.  Confluent supratentorial periventricular white matter T2/FLAIR signal suggestive of chronic microvascular ischemic change.  Remote bilateral cerebellar infarcts.  No parenchymal mass, hemorrhage, or edema.    Bone marrow signal intensity unremarkable.    MRA:    Distal internal carotid arteries are normal in caliber.  No significant stenosis at either carotid bifurcation.    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  The basilar artery appears within normal limits.    SAMMI trifurcation.  The ACAs, MCAs and PCAs demonstrate no evidence of  high-grade stenosis, focal occlusion or intracranial aneurysm.    Impression  Small linear area of diffusion restriction along the right coronary radiata, suggestive of acute  infarct.  No large vessel occlusion.    Sequela of chronic microvascular ischemic changes.    Stable right subdural collection.    Remote bilateral cerebellar infarcts.    Dr. Culver discussed preliminary findings with Dr. Guzmán on 06/19/2022 at 22:00.    Electronically signed by resident: Anna Culver  Date:    06/19/2022  Time:    21:48    Electronically signed by: Flo Scott MD  Date:    06/19/2022  Time:    22:08    Vessel Imaging   CTA STROKE MULTI-PHASE 06/19/2022    Narrative  EXAMINATION:  CTA STROKE MULTI-PHASE    CLINICAL HISTORY:  Neuro deficit, acute, stroke suspected;    TECHNIQUE:  Axial CT images obtained throughout the region of the head after the administration of intravenous contrast.  CT angiogram was performed from through the cervical and intracranial vasculature during the IV bolus administration of 100mL of Omnipaque 350.  Multiplanar MPR and MIP reformats were performed.    Additional 2 delayed images of the intracranial vasculature was performed.    Rapid AI assessment were included.    COMPARISON:  CT 03/05/2022 12/13/2021    FINDINGS:  Evaluation is limited due to suboptimal contrast timing and unavailable noncontrast images.    CT head:    Ventricles size and configuration are similar to prior.  There is stable 3 mm low-density subdural collection overlying the right frontal lobe, similar to prior.    The brain appears unchanged.  Supratentorial periventricular white matter hypoattenuation suggestive of chronic microvascular ischemic change.  Remote bilateral cerebellar infarcts.  Gray and white matter differentiation is preserved.  No parenchymal mass, hemorrhage, edema or major vascular distribution infarct.    Hyperostosis frontalis interna.  No depressed calvarial fracture.  Teeth hardware.  Paranasal sinuses and mastoid air cells are essentially clear.      CTA:    There is stable narrowing right common carotid origin.  Bilateral carotid bifurcation calcifications  without significant narrowing, allowing study limitations.  There is significant cavernous and petrous segment calcifications bilaterally.  No significant stenosis.    Right vertebral artery origin is patent.  Proximal left vertebral artery is not visualized..  The cervical vertebral arteries are normal in course and caliber. Vertebrobasilar system is without focal abnormality.    The anterior, middle, and posterior cerebral arteries are within normal limits, without evidence of significant stenosis, focal occlusion, or intracranial aneurysm formation.    Head and neck soft tissue:    Enlarged thyroid with multiple nodules, dominant nodule within the right thyroid lobe extending to the superior mediastinum and measuring 6.5 cm, similar to prior.  There is a mass effect on with leftward shift of the mediastinal structures.    Leftward midline shift of the trachea due to large thyroid, similar to prior.  Ground-glass opacities and subsegmental atelectases within upper lung zones.    Advanced degenerative spine changes.  There is chronic osteomyelitis at the C6-C7 level.    Impression  Limited exam due to suboptimal contrast timing and unavailable noncontrast images.  Additional evaluation, as clinically warranted    No major vascular distribution infarct or large vessel occlusion.    Stable 3 mm subdural collection overlying the right frontal lobe.    Bilateral distal ICAs atherosclerotic disease without significant stenosis.    Significant enlarged thyroid with multiple nodules, the dominant nodule within the right thyroid lobe is stable in size.  There is a stable significant mass effect on the and superior mediastinal structures with leftward shift.    Ground-glass opacities within the upper lung zones, could represent aspiration, infection, or noninfectious inflammatory process.    Chronic discitis-osteomyelitis at the C6-C7 level.    Additional findings as above.    Electronically signed by resident: Anna  Alsaggaf  Date:    06/19/2022  Time:    20:40    Electronically signed by: Flo Scott MD  Date:    06/19/2022  Time:    21:48    Cardiac Imaging   Pending

## 2022-06-20 NOTE — ED NOTES
Patient was previously placed on portable cardiac monitoring ttx # [id number] and is being monitored at the main nurses station.  Awaiting inpatient bed assignment.

## 2022-06-20 NOTE — ED NOTES
Spoke with stroke team pt will be evaluated once she is on the floor report given waiting for tele box, pt is increasingly confused and agitated, vitals stable, pt will not remain still, keep removing blankets, pt's speech is jumbled and slightly slurred not able to make complete sentences, pt does not seem to be comprehending some of questions I am asking her.

## 2022-06-21 LAB
ALBUMIN SERPL BCP-MCNC: 3.8 G/DL (ref 3.5–5.2)
ALP SERPL-CCNC: 66 U/L (ref 55–135)
ALT SERPL W/O P-5'-P-CCNC: 9 U/L (ref 10–44)
ANION GAP SERPL CALC-SCNC: 10 MMOL/L (ref 8–16)
AST SERPL-CCNC: 12 U/L (ref 10–40)
BASOPHILS # BLD AUTO: 0.03 K/UL (ref 0–0.2)
BASOPHILS NFR BLD: 0.4 % (ref 0–1.9)
BILIRUB SERPL-MCNC: 1.4 MG/DL (ref 0.1–1)
BUN SERPL-MCNC: 10 MG/DL (ref 8–23)
CALCIUM SERPL-MCNC: 9.4 MG/DL (ref 8.7–10.5)
CHLORIDE SERPL-SCNC: 106 MMOL/L (ref 95–110)
CO2 SERPL-SCNC: 24 MMOL/L (ref 23–29)
CREAT SERPL-MCNC: 0.8 MG/DL (ref 0.5–1.4)
DIFFERENTIAL METHOD: ABNORMAL
EOSINOPHIL # BLD AUTO: 0.2 K/UL (ref 0–0.5)
EOSINOPHIL NFR BLD: 3.2 % (ref 0–8)
ERYTHROCYTE [DISTWIDTH] IN BLOOD BY AUTOMATED COUNT: 17.3 % (ref 11.5–14.5)
EST. GFR  (AFRICAN AMERICAN): >60 ML/MIN/1.73 M^2
EST. GFR  (NON AFRICAN AMERICAN): >60 ML/MIN/1.73 M^2
GLUCOSE SERPL-MCNC: 90 MG/DL (ref 70–110)
HCT VFR BLD AUTO: 38 % (ref 37–48.5)
HGB BLD-MCNC: 11.3 G/DL (ref 12–16)
IMM GRANULOCYTES # BLD AUTO: 0.04 K/UL (ref 0–0.04)
IMM GRANULOCYTES NFR BLD AUTO: 0.6 % (ref 0–0.5)
LYMPHOCYTES # BLD AUTO: 2.8 K/UL (ref 1–4.8)
LYMPHOCYTES NFR BLD: 40.3 % (ref 18–48)
MCH RBC QN AUTO: 24.1 PG (ref 27–31)
MCHC RBC AUTO-ENTMCNC: 29.7 G/DL (ref 32–36)
MCV RBC AUTO: 81 FL (ref 82–98)
MONOCYTES # BLD AUTO: 1.7 K/UL (ref 0.3–1)
MONOCYTES NFR BLD: 24.8 % (ref 4–15)
NEUTROPHILS # BLD AUTO: 2.1 K/UL (ref 1.8–7.7)
NEUTROPHILS NFR BLD: 30.7 % (ref 38–73)
NRBC BLD-RTO: 0 /100 WBC
PLATELET # BLD AUTO: 109 K/UL (ref 150–450)
PMV BLD AUTO: 11.1 FL (ref 9.2–12.9)
POTASSIUM SERPL-SCNC: 4.2 MMOL/L (ref 3.5–5.1)
PROT SERPL-MCNC: 6.9 G/DL (ref 6–8.4)
RBC # BLD AUTO: 4.68 M/UL (ref 4–5.4)
SODIUM SERPL-SCNC: 140 MMOL/L (ref 136–145)
WBC # BLD AUTO: 6.93 K/UL (ref 3.9–12.7)

## 2022-06-21 PROCEDURE — 25000003 PHARM REV CODE 250: Performed by: STUDENT IN AN ORGANIZED HEALTH CARE EDUCATION/TRAINING PROGRAM

## 2022-06-21 PROCEDURE — 92523 SPEECH SOUND LANG COMPREHEN: CPT

## 2022-06-21 PROCEDURE — 99222 PR INITIAL HOSPITAL CARE,LEVL II: ICD-10-PCS | Mod: ,,, | Performed by: NURSE PRACTITIONER

## 2022-06-21 PROCEDURE — 36415 COLL VENOUS BLD VENIPUNCTURE: CPT | Performed by: NURSE PRACTITIONER

## 2022-06-21 PROCEDURE — 97530 THERAPEUTIC ACTIVITIES: CPT | Mod: CQ

## 2022-06-21 PROCEDURE — 99222 1ST HOSP IP/OBS MODERATE 55: CPT | Mod: ,,, | Performed by: NURSE PRACTITIONER

## 2022-06-21 PROCEDURE — 99233 PR SUBSEQUENT HOSPITAL CARE,LEVL III: ICD-10-PCS | Mod: ,,, | Performed by: PSYCHIATRY & NEUROLOGY

## 2022-06-21 PROCEDURE — 80053 COMPREHEN METABOLIC PANEL: CPT | Performed by: NURSE PRACTITIONER

## 2022-06-21 PROCEDURE — 97535 SELF CARE MNGMENT TRAINING: CPT

## 2022-06-21 PROCEDURE — 85025 COMPLETE CBC W/AUTO DIFF WBC: CPT | Performed by: NURSE PRACTITIONER

## 2022-06-21 PROCEDURE — 25000003 PHARM REV CODE 250: Performed by: NURSE PRACTITIONER

## 2022-06-21 PROCEDURE — 82728 ASSAY OF FERRITIN: CPT | Performed by: NURSE PRACTITIONER

## 2022-06-21 PROCEDURE — 63600175 PHARM REV CODE 636 W HCPCS: Performed by: STUDENT IN AN ORGANIZED HEALTH CARE EDUCATION/TRAINING PROGRAM

## 2022-06-21 PROCEDURE — 84466 ASSAY OF TRANSFERRIN: CPT | Performed by: NURSE PRACTITIONER

## 2022-06-21 PROCEDURE — 92526 ORAL FUNCTION THERAPY: CPT

## 2022-06-21 PROCEDURE — 93010 ELECTROCARDIOGRAM REPORT: CPT | Mod: ,,, | Performed by: INTERNAL MEDICINE

## 2022-06-21 PROCEDURE — 93005 ELECTROCARDIOGRAM TRACING: CPT

## 2022-06-21 PROCEDURE — 97112 NEUROMUSCULAR REEDUCATION: CPT

## 2022-06-21 PROCEDURE — 11000001 HC ACUTE MED/SURG PRIVATE ROOM

## 2022-06-21 PROCEDURE — 25000003 PHARM REV CODE 250: Performed by: PHYSICIAN ASSISTANT

## 2022-06-21 PROCEDURE — 99233 SBSQ HOSP IP/OBS HIGH 50: CPT | Mod: ,,, | Performed by: PSYCHIATRY & NEUROLOGY

## 2022-06-21 PROCEDURE — 93010 EKG 12-LEAD: ICD-10-PCS | Mod: ,,, | Performed by: INTERNAL MEDICINE

## 2022-06-21 RX ORDER — AMLODIPINE BESYLATE 10 MG/1
10 TABLET ORAL DAILY
Status: DISCONTINUED | OUTPATIENT
Start: 2022-06-21 | End: 2022-06-23 | Stop reason: HOSPADM

## 2022-06-21 RX ADMIN — ACETAMINOPHEN 650 MG: 325 TABLET ORAL at 09:06

## 2022-06-21 RX ADMIN — Medication 2000 UNITS: at 10:06

## 2022-06-21 RX ADMIN — HYDROXYCHLOROQUINE SULFATE 200 MG: 200 TABLET, FILM COATED ORAL at 09:06

## 2022-06-21 RX ADMIN — MYCOPHENOLATE MOFETIL 500 MG: 250 CAPSULE ORAL at 08:06

## 2022-06-21 RX ADMIN — LACOSAMIDE 100 MG: 10 SOLUTION ORAL at 09:06

## 2022-06-21 RX ADMIN — LACOSAMIDE 100 MG: 10 SOLUTION ORAL at 10:06

## 2022-06-21 RX ADMIN — GABAPENTIN 100 MG: 100 CAPSULE ORAL at 10:06

## 2022-06-21 RX ADMIN — FAMOTIDINE 20 MG: 20 TABLET ORAL at 08:06

## 2022-06-21 RX ADMIN — FUROSEMIDE 40 MG: 40 TABLET ORAL at 10:06

## 2022-06-21 RX ADMIN — ACETAMINOPHEN 500 MG: 500 TABLET ORAL at 09:06

## 2022-06-21 RX ADMIN — AMLODIPINE BESYLATE 10 MG: 10 TABLET ORAL at 10:06

## 2022-06-21 RX ADMIN — QUETIAPINE FUMARATE 12.5 MG: 25 TABLET, FILM COATED ORAL at 09:06

## 2022-06-21 RX ADMIN — APIXABAN 5 MG: 5 TABLET, FILM COATED ORAL at 10:06

## 2022-06-21 RX ADMIN — GABAPENTIN 300 MG: 300 CAPSULE ORAL at 08:06

## 2022-06-21 RX ADMIN — APIXABAN 5 MG: 5 TABLET, FILM COATED ORAL at 08:06

## 2022-06-21 RX ADMIN — FAMOTIDINE 20 MG: 20 TABLET ORAL at 10:06

## 2022-06-21 RX ADMIN — MYCOPHENOLATE MOFETIL 500 MG: 250 CAPSULE ORAL at 10:06

## 2022-06-21 RX ADMIN — GABAPENTIN 100 MG: 100 CAPSULE ORAL at 08:06

## 2022-06-21 RX ADMIN — CALCIUM CARBONATE (ANTACID) CHEW TAB 500 MG 1000 MG: 500 CHEW TAB at 10:06

## 2022-06-21 RX ADMIN — ATORVASTATIN CALCIUM 40 MG: 40 TABLET, FILM COATED ORAL at 10:06

## 2022-06-21 RX ADMIN — HYDROXYCHLOROQUINE SULFATE 200 MG: 200 TABLET, FILM COATED ORAL at 10:06

## 2022-06-21 NOTE — PT/OT/SLP PROGRESS
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  She presents with increased lethargy this session. Pt arouses to verbal/tactile stimuli and follows basic commands, but kept eyes open ~90% of session. Pt required increased assistance with all ADL related to lethargy. Performance deficits affecting function are weakness, impaired functional mobilty, gait instability, impaired endurance, impaired balance, decreased upper extremity function, decreased lower extremity function, impaired self care skills, impaired cognition, decreased safety awareness, impaired cardiopulmonary response to activity, abnormal tone, decreased ROM.     Rehab Prognosis:  Fair; patient would benefit from acute skilled OT services to address these deficits and reach maximum level of function.       Plan:     Patient to be seen 3 x/week to address the above listed problems via therapeutic activities, therapeutic exercises, self-care/home management, cognitive retraining, neuromuscular re-education, wheelchair management/training  · Plan of Care Expires: 07/20/22  · Plan of Care Reviewed with: patient    Subjective     Pain/Comfort:  · Pain Rating 1: 0/10  · Pain Rating Post-Intervention 1: 0/10    Objective:     Communicated with: RN prior to session.  Patient found supine with PureWick, telemetry upon OT entry to room.    General Precautions: Standard, fall   Orthopedic Precautions:    Braces:    Respiratory Status: Room air     Occupational Performance:     Bed Mobility:    · Patient completed Rolling/Turning  to Left with  maximal assistance  · Patient completed Scooting/Bridging with total assistance  · Patient completed Supine to Sit with maximal assistance  · Patient completed Sit to Supine with maximal assistance     Functional Mobility/Transfers:  · Patient completed Sit <> Stand Transfer with maximal assistance  with  hand-held assist from EOB  · Functional Mobility: Pt unable to take any sidesteps    Activities of Daily Living:  · Grooming: total assistance with [place] A for washing face  · Upper Body Dressing: total assistance    · Lower Body Dressing: total assistance        AMPAC 6 Click ADL: 6    Treatment & Education:  Pt ed on OT POC  Daily orientation provided  Tx focused on upright sitting and postural control  Pt completed reaching across midline and trunk extension for core strengthening  Pt copleted L UE AAROM ex's    Patient left supine with all lines intact, call button in reach and RN notifiedEducation:      GOALS:   Multidisciplinary Problems     Occupational Therapy Goals        Problem: Occupational Therapy    Goal Priority Disciplines Outcome Interventions   Occupational Therapy Goal     OT, PT/OT Ongoing, Progressing    Description: Goals to be met by: 7/4/22     Patient will increase functional independence with ADLs by performing:    Feeding with Stand-by Assistance.  UE Dressing with Minimal Assistance.  LE Dressing with Moderate Assistance.  Grooming while standing at sink with Minimal Assistance.  Toileting from bedside commode with Moderate Assistance for hygiene and clothing management.   Toilet transfer to bedside commode with Minimal Assistance.                     Time Tracking:     OT Date of Treatment: 06/21/22  OT Start Time: 0913  OT Stop Time: 0940  OT Total Time (min): 27 min    Billable Minutes:Neuromuscular Re-education 27               6/21/2022

## 2022-06-21 NOTE — HPI
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 Patient presented to [facility] on 12/13 for R SDH after being found down. Multiple follow up CTHs stable, last 12/27. Hospital course complicated by AMS and PEA arrest on 12/20 (was intubated now extubated and improved), delirium (improved, Epilepsy recommenidng Vimpat and f/u outpt), anemia (now stable likely 2/2 chronic disease per HM).      Functional History: Patient lives with  children in a single story home with 5 steps to enter.  Prior to admission, needs assistance with adls. caregiver 5-7 days a week for 5 hours. DME: RW and w/c  if needed    Patient lives with their 2 adult children  in single story home, 5 steps with Bilateral hand rail, tub/shower.   Pt utilizes rolling walker for ambulation of short household distances . Pt states she also uses her wheelchair and is able to self propel until her arms start to hurt  Prior to admission, patient required assist with all ADLs.   DME owned: cane, straight, walker, rolling, wheelchair.

## 2022-06-21 NOTE — SUBJECTIVE & OBJECTIVE
Past Medical History:   Diagnosis Date    Acute cystitis without hematuria 2/27/2020    Acute hypoxemic respiratory failure 4/11/2018    Acute respiratory failure with hypoxia 3/2/2020    Altered mental status     Anemia     Arthritis     Bilateral hand pain 3/14/2018    Branch retinal vein occlusion, left eye 2/20/2015    Chronic bilateral low back pain without sciatica 3/23/2017    Chronic renal failure in pediatric patient, stage 3 (moderate) 4/15/2018    Cognitive communication deficit 12/19/2017    Cystoid macular edema, left eye 2/20/2015    Cystoid macular edema, left eye 2/20/2015    Delirium 12/30/2021    DJD (degenerative joint disease) of cervical spine 5/15/2013    Enterococcal bacteremia     Fatty liver 8/26/2014    Goiter 4/9/2018    Hashimoto's disease     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    Hypertension     Hypertensive encephalopathy     IBS (irritable bowel syndrome) 6/21/2017    IGT (impaired glucose tolerance) 1/12/2016    Iron deficiency anemia secondary to inadequate dietary iron intake 6/24/2013    Joint pain     Keratoconjunctivitis sicca of both eyes not specified as Sjogren's 7/29/2016    Leiomyoma of uterus, unspecified 9/16/2014    Long QT interval 6/29/2016    Due to medication (plaquenil)     Macular edema 1/12/2015    Multinodular goiter 1/12/2016    Neuropathy 8/23/2017    Plaquenil causing adverse effect in therapeutic use 10/7/2016    Pneumococcal vaccine refused 8/17/2016    Pneumonia due to Streptococcus pneumoniae 4/5/2018    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/2015    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 ENDO (4TH FLR);  Service: Endoscopy;  Laterality: N/A;    EYE SURGERY      INJECTION OF ANESTHETIC AGENT AROUND NERVE Left 4/19/2021    Procedure: BLOCK, NERVE, FEMORAL AND OBTURATOR;  Surgeon: Shivam Gonzalez MD;  Location: [facility] [place], operated by [facility] PAIN MGT;  Service: Pain Management;  Laterality: Left;  consent needed    JOINT REPLACEMENT      right knee    KNEE SURGERY Left 12/31/2013    TKR    left parotidectomy      mixed tumor    LA EVAL,SWALLOW FUNCTION,CINE/VIDEO RECORD  6/5/2021         SALIVARY GLAND SURGERY      TONSILLECTOMY      UPPER GASTROINTESTINAL ENDOSCOPY       Review of patient's allergies indicates:  No Known Allergies    Scheduled Medications:    amLODIPine  10 mg Oral Daily    apixaban  5 mg Oral BID    atorvastatin  40 mg Oral Daily    calcium carbonate  1,000 mg Oral Daily    famotidine  20 mg Oral BID    furosemide  40 mg Oral Every other day    gabapentin  100 mg Oral BID    gabapentin  300 mg Oral QHS    hydrOXYchloroQUINE  200 mg Oral BID    lacosamide  100 mg Oral Q12H    mycophenolate  500 mg Oral BID    QUEtiapine  12.5 mg Oral QHS    vitamin D  2,000 Units Oral Daily       PRN Medications: acetaminophen, acetaminophen, albuterol, baclofen, labetaloL, sodium chloride 0.9%, sodium chloride 0.9%    Family History       Problem Relation (Age of Onset)    Breast cancer Maternal Grandmother    Cancer Father    Heart disease Mother    Hypertension Mother    Hyperthyroidism Mother, Other    Prostate cancer Father          Tobacco Use    Smoking status: Never Smoker    Smokeless tobacco: Never Used   Substance and Sexual Activity    Alcohol use: Yes     Alcohol/week: 0.0 standard drinks     Comment: very seldom     Drug use: No    Sexual activity: Not Currently     Comment: ,  age 52,      Review of Systems   Constitutional:  Positive for activity change. Negative for fatigue and fever.   HENT:  Negative for sore throat and trouble  swallowing.    Eyes:  Negative for visual disturbance.   Respiratory:  Negative for cough and shortness of breath.    Cardiovascular:  Negative for chest pain and leg swelling.   Gastrointestinal:  Negative for abdominal distention and abdominal pain.   Genitourinary:  Negative for difficulty urinating.   Musculoskeletal:  Positive for gait problem. Negative for back pain.   Skin:  Negative for color change.   Neurological:  Positive for weakness. Negative for dizziness, light-headedness and headaches.   Psychiatric/Behavioral:  Negative for agitation and confusion.    Objective:     Vital Signs (Most Recent):  Temp: 97.7 °F (36.5 °C) (06/21/22 0803)  Pulse: 87 (06/21/22 0939)  Resp: 16 (06/21/22 0803)  BP: 138/65 (06/21/22 0803)  SpO2: 98 % (06/21/22 0803)    Vital Signs (24h Range):  Temp:  [97 °F (36.1 °C)-98.1 °F (36.7 °C)] 97.7 °F (36.5 °C)  Pulse:  [] 87  Resp:  [16-20] 16  SpO2:  [95 %-100 %] 98 %  BP: (138-171)/(65-82) 138/65     Body mass index is 24.03 kg/m².    Physical Exam  Vitals and nursing note reviewed.   Constitutional:       Appearance: Normal appearance. She is well-developed.   HENT:      Nose: Nose normal.      Mouth/Throat:      Mouth: Mucous membranes are moist.   Eyes:      Pupils: Pupils are equal, round, and reactive to light.   Pulmonary:      Effort: Pulmonary effort is normal.      Breath sounds: Normal breath sounds.   Abdominal:      General: Bowel sounds are normal.      Palpations: Abdomen is soft.   Musculoskeletal:      Cervical back: Normal range of motion and neck supple.      Comments: L sided weakness   Skin:     General: Skin is warm and dry.   Neurological:      Mental Status: She is alert.      Motor: Weakness present.      Gait: Gait abnormal.   Psychiatric:         Mood and Affect: Mood normal.     NEUROLOGICAL EXAMINATION:     CRANIAL NERVES     CN III, IV, VI   Pupils are equal, round, and reactive to light.    Diagnostic Results: Labs: Reviewed  ECG:  Reviewed  MRI: Reviewed

## 2022-06-21 NOTE — ASSESSMENT & PLAN NOTE
PT/OT recommending inpatient rehab  - patient considering inpatient rehab vs home health vs outpatient rehab; patient amenable to rehab. Will continue to discuss with patient and family.

## 2022-06-21 NOTE — PLAN OF CARE
06/21/22 1422   Post-Acute Status   Post-Acute Authorization Placement   Post-Acute Placement Status Referrals Sent       CM spoke with patient regarding rehab recommendations from therapy and team note stating she is amendable. She would prefer [facility]. CM sent referral to [facility] and [facility]

## 2022-06-21 NOTE — CONSULTS
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 Patient presented to [facility] on 12/13 for R SDH after being found down. Multiple follow up CTHs stable, last 12/27. Hospital course complicated by AMS and PEA arrest on 12/20 (was intubated now extubated and improved), delirium (improved, Epilepsy recommenidng Vimpat and f/u outpt), anemia (now stable likely 2/2 chronic disease per ).      Functional History: Patient lives with  children in a single story home with 5 steps to enter.  Prior to admission, needs assistance with adls. caregiver 5-7 days a week for 5 hours. DME: RW and w/c  if needed      Hospital Course: 6/20/22: Participated w/ OT. Bed mob mod- totalA. Grooming & UBD maxA.       Past Medical History:   Diagnosis Date    Acute cystitis without hematuria 2/27/2020    Acute hypoxemic respiratory failure 4/11/2018    Acute respiratory failure with hypoxia 3/2/2020    Altered mental status     Anemia     Arthritis     Bilateral hand pain 3/14/2018    Branch retinal vein occlusion, left eye 2/20/2015    Chronic bilateral low back pain without sciatica 3/23/2017    Chronic renal failure in pediatric patient, stage 3 (moderate) 4/15/2018    Cognitive communication deficit 12/19/2017    Cystoid macular edema, left eye 2/20/2015    Cystoid macular edema, left eye 2/20/2015     Delirium 12/30/2021    DJD (degenerative joint disease) of cervical spine 5/15/2013    Enterococcal bacteremia     Fatty liver 8/26/2014    Goiter 4/9/2018    Hashimoto's disease     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    Hypertension     Hypertensive encephalopathy     IBS (irritable bowel syndrome) 6/21/2017    IGT (impaired glucose tolerance) 1/12/2016    Iron deficiency anemia secondary to inadequate dietary iron intake 6/24/2013    Joint pain     Keratoconjunctivitis sicca of both eyes not specified as Sjogren's 7/29/2016    Leiomyoma of uterus, unspecified 9/16/2014    Long QT interval 6/29/2016    Due to medication (plaquenil)     Macular edema 1/12/2015    Multinodular goiter 1/12/2016    Neuropathy 8/23/2017    Plaquenil causing adverse effect in therapeutic use 10/7/2016    Pneumococcal vaccine refused 8/17/2016    Pneumonia due to Streptococcus pneumoniae 4/5/2018    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/2015    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    s/p Right Total knee replacement 5/15/2013    Scoliosis of thoracic spine 5/15/2013    Small vessel disease, cerebrovascular 12/28/2017    Stroke     UTI (urinary tract infection) 12/29/2018    Vascular dementia 12/6/2017     Past Surgical History:   Procedure Laterality Date    BREAST CYST EXCISION      CATARACT EXTRACTION      COLONOSCOPY N/A 9/29/2015    Procedure: COLONOSCOPY;  Surgeon: FIDELINA Sanchez MD;  Location: [facility] ENDO [address]);  Service: Endoscopy;  Laterality: N/A;    EYE SURGERY      INJECTION OF ANESTHETIC AGENT AROUND NERVE Left 4/19/2021    Procedure: BLOCK, NERVE, FEMORAL AND OBTURATOR;  Surgeon: Shivam Gonzalez MD;  Location: [facility] PAIN T;  Service: Pain Management;  Laterality: Left;  consent needed    JOINT REPLACEMENT      right knee    KNEE SURGERY Left 12/31/2013    TKR    left parotidectomy      mixed tumor    LA EVAL,SWALLOW FUNCTION,CINE/VIDEO RECORD  6/5/2021         SALIVARY GLAND  SURGERY      TONSILLECTOMY      UPPER GASTROINTESTINAL ENDOSCOPY       Review of patient's allergies indicates:  No Known Allergies    Scheduled Medications:    amLODIPine  10 mg Oral Daily    apixaban  5 mg Oral BID    atorvastatin  40 mg Oral Daily    calcium carbonate  1,000 mg Oral Daily    famotidine  20 mg Oral BID    furosemide  40 mg Oral Every other day    gabapentin  100 mg Oral BID    gabapentin  300 mg Oral QHS    hydrOXYchloroQUINE  200 mg Oral BID    lacosamide  100 mg Oral Q12H    mycophenolate  500 mg Oral BID    QUEtiapine  12.5 mg Oral QHS    vitamin D  2,000 Units Oral Daily       PRN Medications: acetaminophen, acetaminophen, albuterol, baclofen, labetaloL, sodium chloride 0.9%, sodium chloride 0.9%    Family History       Problem Relation (Age of Onset)    Breast cancer Maternal Grandmother    Cancer Father    Heart disease Mother    Hypertension Mother    Hyperthyroidism Mother, Other    Prostate cancer Father          Tobacco Use    Smoking status: Never Smoker    Smokeless tobacco: Never Used   Substance and Sexual Activity    Alcohol use: Yes     Alcohol/week: 0.0 standard drinks     Comment: very seldom     Drug use: No    Sexual activity: Not Currently     Comment: ,  age 52,      Review of Systems   Constitutional:  Positive for activity change. Negative for fatigue and fever.   HENT:  Negative for sore throat and trouble swallowing.    Eyes:  Negative for visual disturbance.   Respiratory:  Negative for cough and shortness of breath.    Cardiovascular:  Negative for chest pain and leg swelling.   Gastrointestinal:  Negative for abdominal distention and abdominal pain.   Genitourinary:  Negative for difficulty urinating.   Musculoskeletal:  Positive for gait problem. Negative for back pain.   Skin:  Negative for color change.   Neurological:  Positive for weakness. Negative for dizziness, light-headedness and headaches.   Psychiatric/Behavioral:  Negative for  agitation and confusion.    Objective:     Vital Signs (Most Recent):  Temp: 97.7 °F (36.5 °C) (06/21/22 0803)  Pulse: 87 (06/21/22 0939)  Resp: 16 (06/21/22 0803)  BP: 138/65 (06/21/22 0803)  SpO2: 98 % (06/21/22 0803)    Vital Signs (24h Range):  Temp:  [97 °F (36.1 °C)-98.1 °F (36.7 °C)] 97.7 °F (36.5 °C)  Pulse:  [] 87  Resp:  [16-20] 16  SpO2:  [95 %-100 %] 98 %  BP: (138-171)/(65-82) 138/65     Body mass index is 24.03 kg/m².    Physical Exam  Vitals and nursing note reviewed.   Constitutional:       Appearance: Normal appearance. She is well-developed.   HENT:      Nose: Nose normal.      Mouth/Throat:      Mouth: Mucous membranes are moist.   Eyes:      Pupils: Pupils are equal, round, and reactive to light.   Pulmonary:      Effort: Pulmonary effort is normal.      Breath sounds: Normal breath sounds.   Abdominal:      General: Bowel sounds are normal.      Palpations: Abdomen is soft.   Musculoskeletal:      Cervical back: Normal range of motion and neck supple.      Comments: L sided weakness   Skin:     General: Skin is warm and dry.   Neurological:      Mental Status: She is alert.      Motor: Weakness present.      Gait: Gait abnormal.   Psychiatric:         Mood and Affect: Mood normal.     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     Recommendations  -  Encourage mobility, OOB in chair at least 3 hours per day, and early ambulation as appropriate  -  PT/OT evaluate and treat  -  Pain management  -  Monitor for and prevent skin breakdown and pressure ulcers  · Early mobility, repositioning/weight shifting every 20-30 minutes when sitting, turn patient every 2 hours, proper mattress/overlay and chair cushioning, pressure relief/heel protector boots  -  DVT prophylaxis    -  Reviewed discharge options (IP rehab, SNF, HH therapy,  and OP therapy)    Age-related physical debility  - PT & OT following     Generalized weakness  - Related to prolonged/acute hospital course.     Recommendations  -  Encourage mobility, OOB in chair at least 3 hours per day, and early ambulation as appropriate  -  PT/OT evaluate and treat  -  Pain management  -  Monitor for and prevent skin breakdown and pressure ulcers  · Early mobility, repositioning/weight shifting every 20-30 minutes when sitting, turn patient every 2 hours, proper mattress/overlay and chair cushioning, pressure relief/heel protector boots  -  DVT prophylaxis    -  Reviewed discharge options (IP rehab, SNF, HH therapy, and OP therapy)    PM&R Recommendation:     PM&R Recommendation:     At this time, the PM&R team has reviewed this patient's ongoing medical case including inpatient diagnosis, medical history, clinical examination, labs, vitals, current social and functional history to provide the post-acute recommendation as follows:     RECOMMENDATIONS: inpatient rehabilitation due to good motivation/participation with therapies and good potential for recovery.    MEDICAL STABILITY:     At this time, barriers for post-acute rehab admission stabilization of delirium at night.    I will sign off with the transfer of the patient to [facility] liaison who will continue to follow going forward until admission to [facility].     Thank you for your consult.     Frances Vazquez NP  Department of Physical Medicine & Rehab  Francisco Lyons - Neurosurgery ([facility])

## 2022-06-21 NOTE — ASSESSMENT & PLAN NOTE
86 y.o. female with PMHx prior stroke, AFib (on eliquis), HTN, HLD and TIA who presents to [facility] ED with left sided weakness. LKN 2300 6/18/2022. On evaluation patient is hypotensive with left sided hemiparesis. NIHSS 4. Labs CBC/CMP benign; mild Cr increase from priors. Imaging reviewed; CTA limited by contrast extravasation. CTH prelim read with hypodensity in MCA distribution, however, similar to prior CTH films. CTA without LVO. tPA not given as patient OOW and on anticoagulation. Ordered MRI brain acute ischemic protocol for further evaluation as patient is within thrombectomy window. MRI brain with small linear area of diffusion restriction along the right coronary radiata, suggestive of acute infarct. Stroke Etiology: Ischemic Small Vessel Disease (Lacunar). PT/OT recommending inpatient rehab; patient notes poor therapeutic relationships in the past and is considering outpatient rehab vs home health. Will discuss further.    Antithrombotics for secondary stroke prevention: Anticoagulants: Apixaban 5 mg BID     Statins for secondary stroke prevention and hyperlipidemia, if present:   Statins: Atorvastatin- 40 mg daily    Aggressive risk factor modification: HTN, HLD, Diet, Exercise, A-Fib     Rehab efforts: The patient has been evaluated by a stroke team provider and the therapy needs have been fully considered based off the presenting complaints and exam findings. The following therapy evaluations are needed: PT evaluate and treat, OT evaluate and treat, SLP evaluate and treat, PM&R evaluate for appropriate placement    Diagnostics ordered/pending: None     VTE prophylaxis: None: Reason for No Pharmacological VTE Prophylaxis: Currently on anticoagulation    BP parameters: Infarct: No intervention, SBP <220

## 2022-06-21 NOTE — PLAN OF CARE
Francisco Lyons - Neurosurgery ([facility])  Initial Discharge Assessment       Primary Care Provider: Bhargav Hirsch MD  Admission Diagnosis: Stroke [I63.9]  KERMIT (acute kidney injury) [N17.9]  Thrombotic stroke involving right middle cerebral artery [I63.311]  Admission Date: 6/19/2022  Expected Discharge Date: 6/24/2022    CM completed discharge planning assessment.  Patient is AAO x 4.  Pt verified that all demographic information on face sheet is correct.  Pt verified PCP -   Dr. RIGO Hirsch.  Pt verified primary health insurance is Medicare and secondary is  for Life.  Patient current with home health - no.  Has below listed DME.  POA and Living Will on file - Living will.  Patient on dialysis - no .  Patient on coumadin - no.  Patient is a 30-day readmission - no.  Patient currently has financial issues - no.  Patient will have transportation upon discharge from facility - john Gallegos.  Patient ADLs - assist.  Patient lives -  2 adult children.   Discharge Planning Booklet given to patient/family and discussed.    All questions addressed.    Discharge Barriers Identified: None  Payor: MEDICARE / Plan: MEDICARE PART A & B / Product Type: Government /   Extended Emergency Contact Information  Primary Emergency Contact: Rosy Rosado  Address: [address] [place] [address]  Mobile Phone: [phone]  Relation: Daughter  Secondary Emergency Contact: Madhavi Lomas  Address: [address] [place] [address]  Mobile Phone: [phone]  Relation: Daughter  Discharge Plan A: Rehab  Discharge Plan B: Home with family, Home Health    [id number] [address] [facility]  [address] [facility] [address]  Phone: [phone] Fax: [phone]    [facility] - [place], [place] - [address] [facility]  [address] [facility]  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 Yes     Do you have help at home or someone to help you manage your care at home? Yes     Who are your caregiver(s) and their phone number(s)? Rosy Rosado (daughter) [phone]     Prior to hospitilization cognitive status: Alert/Oriented     Current cognitive status: Alert/Oriented     Walking or Climbing Stairs Difficulty ambulation difficulty, requires equipment;ambulation difficulty, assistance 1 person;stair climbing difficulty, dependent;transferring difficulty, assistance 1 person     Dressing/Bathing Difficulty bathing difficulty, assistance 1 person;dressing difficulty, assistance 1 person     Dressing/Bathing Management daughter assists with ADL's     Home Accessibility stairs to enter home   [facility]    Number of Stairs, Main Entrance five     Home Layout Able to live on 1st floor     Equipment Currently Used at Home cane, straight;walker, rolling;wheelchair     Readmission within 30 days? No     Patient currently being followed by outpatient case management? No     Do you currently have service(s) that help you manage your care at home? Yes     Name and Contact number of agency sitters     Do you take prescription medications? Yes     Do you have any problems affording any of your prescribed medications? TBD     Who is going to help you get home at discharge? Rosy Rosado  (daughter) [phone]     How do you get to doctors appointments? family or friend will provide     Are you on dialysis? No     Do you take coumadin? No     Discharge Plan A Rehab     Discharge Plan B Home with family;Home Health     DME Needed Upon Discharge  other (see comments)   tbd    Discharge Plan discussed with: Patient     Discharge Barriers Identified None        Relationship/Environment    Name(s) of Who Lives With Patient Rosy Rosado (daughter) [phone]                  Gardenia Flower RN  Case Management  Ext: [phone]

## 2022-06-21 NOTE — PROGRESS NOTES
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 female with PMHx prior stroke, AFib (on eliquis), HTN, HLD and TIA who presents to [facility] ED with left sided weakness. LKN 2300 6/18/2022. On evaluation patient is hypotensive with left sided hemiparesis. NIHSS 4. Labs CBC/CMP benign; mild Cr increase from priors. Imaging reviewed; CTA limited by contrast extravasation. CTH prelim read with hypodensity in MCA distribution, however, similar to prior CTH films. CTA without LVO. tPA not given as patient OOW and on anticoagulation. Ordered MRI brain acute ischemic protocol for further evaluation as patient is within thrombectomy window. MRI brain with small linear area of diffusion restriction along the right coronary radiata, suggestive of acute infarct. Stroke Etiology: Ischemic Small Vessel Disease (Lacunar). PT/OT recommending inpatient rehab; patient notes poor therapeutic relationships in the past and is considering outpatient rehab vs home health. Will discuss further.    Antithrombotics for secondary stroke prevention: Anticoagulants: Apixaban 5 mg BID     Statins for secondary stroke prevention and hyperlipidemia, if present:   Statins: Atorvastatin- 40 mg daily    Aggressive risk factor modification: HTN, HLD, Diet, Exercise, A-Fib     Rehab efforts: The patient has been evaluated by a stroke team provider and the therapy needs have been fully considered based off the presenting complaints and exam findings. The following therapy evaluations are needed: PT evaluate and treat, OT evaluate and treat, SLP evaluate and treat, PM&R evaluate for appropriate placement    Diagnostics ordered/pending: None     VTE prophylaxis: None: Reason for No Pharmacological VTE Prophylaxis: Currently on anticoagulation    BP parameters: Infarct: No intervention, SBP <220    AF  (paroxysmal atrial fibrillation)  Stroke risk factor  On eliquis  EKG 06/21/2022 NSR  Not a candidate for tPA    Hyperlipidemia  Stroke risk factor  Lipid panel pending  Continue atorvastatin 40 mg QD    Hypertension, essential  Stroke risk factor  Workup on going  No hemorrhage on CTA  Re-started amlodipine    RA (rheumatoid arthritis)  Continue home medications    PUD (peptic ulcer disease)  Continue home medications    COPD (chronic obstructive pulmonary disease)  PRN albuterol per home medication review    Age-related physical debility  PT/OT    Generalized weakness  PT/OT    Vitamin D insufficiency  Continue home medications    Discharge planning issues  PT/OT recommending inpatient rehab  - patient considering inpatient rehab vs home health vs outpatient rehab; patient amenable to rehab. Will continue to discuss with patient and family.         No notes on file    STROKE DOCUMENTATION   Acute Stroke Times   Last Known Normal Date: 06/18/22  Last Known Normal Time: 2300  Symptom Onset Date: 06/19/22  Symptom Onset Time: 0830  Stroke Team Called Date: 06/19/22  Stroke Team Called Time: 1950  Stroke Team Arrival Date: 06/19/22  Stroke Team Arrival Time: 1955  CT Interpretation Time: 2015  Alteplase Recommended: No  CTA Interpretation Time: 2020    NIH Scale:  1a. Level of Consciousness: 0-->Alert, keenly responsive  1b. LOC Questions: 0-->Answers both questions correctly  1c. LOC Commands: 0-->Performs both tasks correctly  2. Best Gaze: 0-->Normal  3. Visual: 0-->No visual loss  4. Facial Palsy: 0-->Normal symmetrical movements  5a. Motor Arm, Left: 2-->Some effort against gravity, limb cannot get to or maintain (if cued) 90 (or 45) degrees, drifts down to bed, but has some effort against gravity  5b. Motor Arm, Right: 0-->No drift, limb holds 90 (or 45) degrees for full 10 secs  6a. Motor Leg, Left: 2-->Some effort against gravity, leg falls to bed by 5 secs, but has some effort against gravity  6b. Motor Leg,  Right: 0-->No drift, leg holds 30 degree position for full 5 secs  7. Limb Ataxia: 0-->Absent  8. Sensory: 0-->Normal, no sensory loss  9. Best Language: 0-->No aphasia, normal  10. Dysarthria: 0-->Normal  11. Extinction and Inattention (formerly Neglect): 0-->No abnormality  Total (NIH Stroke Scale): 4       Modified Enrike Score: 1  Marion Coma Scale:    ABCD2 Score:    [id number] Score:8  HAS -BLED Score:   ICH Score:   Hunt & Jain Classification:      Hemorrhagic change of an Ischemic Stroke: Does this patient have an ischemic stroke with hemorrhagic changes? No     Neurologic Chief Complaint: Lacunar stroke    Subjective:     Interval History: Patient is seen for follow-up neurological assessment and treatment recommendations:     Patient agitated overnight; managed with Seroquel. Vital signs are stable and within normal limits. Patient is conscious and comfortable. PT/OT recommending inpatient rehab. Patient amenable.     HPI, Past Medical, Family, and Social History remains the same as documented in the initial encounter.     Review of Systems   Constitutional:  Positive for activity change and fatigue. Negative for fever.   HENT:  Negative for facial swelling and hearing loss.    Eyes:  Negative for pain, discharge and visual disturbance.   Respiratory:  Negative for cough and shortness of breath.    Cardiovascular:  Negative for chest pain and palpitations.   Gastrointestinal:  Negative for abdominal pain and nausea.   Musculoskeletal:  Positive for arthralgias. Negative for neck pain.   Skin:  Negative for color change and rash.   Neurological:  Positive for weakness. Negative for facial asymmetry, speech difficulty and numbness.   Psychiatric/Behavioral:  Negative for agitation and confusion.      Scheduled Meds:   amLODIPine  10 mg Oral Daily    apixaban  5 mg Oral BID    atorvastatin  40 mg Oral Daily    calcium carbonate  1,000 mg Oral Daily    famotidine  20 mg Oral BID    furosemide  40 mg  Oral Every other day    gabapentin  100 mg Oral BID    gabapentin  300 mg Oral QHS    hydrOXYchloroQUINE  200 mg Oral BID    lacosamide  100 mg Oral Q12H    mycophenolate  500 mg Oral BID    QUEtiapine  12.5 mg Oral QHS    vitamin D  2,000 Units Oral Daily     Continuous Infusions:   sodium chloride 0.9%       PRN Meds:acetaminophen, acetaminophen, albuterol, baclofen, labetaloL, sodium chloride 0.9%, sodium chloride 0.9%    Objective:     Vital Signs (Most Recent):  Temp: 98.6 °F (37 °C) (06/21/22 1145)  Pulse: 91 (06/21/22 1145)  Resp: 16 (06/21/22 1145)  BP: 119/61 (06/21/22 1145)  SpO2: (!) 91 % (06/21/22 1145)  BP Location: Left arm    Vital Signs Range (Last 24H):  Temp:  [97 °F (36.1 °C)-98.6 °F (37 °C)]   Pulse:  []   Resp:  [16-20]   BP: (119-171)/(61-82)   SpO2:  [91 %-100 %]   BP Location: Left arm    Physical Exam  Vitals reviewed.   Constitutional:       General: She is not in acute distress.     Appearance: Normal appearance. She is normal weight. She is not ill-appearing.   HENT:      Head: Normocephalic and atraumatic.      Mouth/Throat:      Mouth: Mucous membranes are moist.      Pharynx: Oropharynx is clear.   Eyes:      General:         Right eye: No discharge.         Left eye: No discharge.      Extraocular Movements: Extraocular movements intact.      Conjunctiva/sclera: Conjunctivae normal.      Pupils: Pupils are equal, round, and reactive to light.   Cardiovascular:      Rate and Rhythm: Normal rate.   Pulmonary:      Effort: Pulmonary effort is normal. No respiratory distress.   Neurological:      Mental Status: She is alert.      Motor: Weakness present.       Neurological Exam:   LOC: alert  Attention Span: Good   Language: No aphasia  Articulation: No dysarthria  Orientation: Person, Place, Time   Visual Fields: Full  EOM (CN III, IV, VI): Full/intact  Pupils (CN II, III): PERRL  Facial Sensation (CN V): Normal  Facial Movement (CN VII): Symmetric facial expression     Motor: Arm left  Paresis: 3/5; extensor weakness  Leg left  Paresis: 3-/5; flexor weakness  Arm right  Normal 5/5  Leg right Normal 5/5  Cerebellum: No evidence of appendicular or axial ataxia, cannot perform on left due to weakness  Sensation: Intact to light touch and temperature    Laboratory:  CMP:   Recent Labs   Lab 06/21/22  0838   CALCIUM 9.4   ALBUMIN 3.8   PROT 6.9      K 4.2   CO2 24      BUN 10   CREATININE 0.8   ALKPHOS 66   ALT 9*   AST 12   BILITOT 1.4*       CBC:   Recent Labs   Lab 06/21/22  0838   WBC 6.93   RBC 4.68   HGB 11.3*   HCT 38.0   *   MCV 81*   MCH 24.1*   MCHC 29.7*       Lipid Panel:   Recent Labs   Lab 06/19/22  2045   CHOL 120   LDLCALC 45.0*   HDL 65   TRIG 50       Coagulation:   Recent Labs   Lab 06/20/22  0453   INR 1.1   APTT 23.4       Platelet Aggregation Study: No results for input(s): PLTAGG, PLTAGINTERP, PLTAGREGLACO, ADPPLTAGGREG in the last 168 hours.  Hgb A1C:   Recent Labs   Lab 06/20/22  0226   HGBA1C 5.3       TSH:   Recent Labs   Lab 06/19/22  2045   TSH 2.565         Diagnostic Results     Brain Imaging   MRI Brain Ischemic Inter Pro Incl MRA W/O Con 06/19/2022    Narrative  EXAMINATION:  MRI BRAIN ISCHEMIC INTERVENTIONAL PROTOCOL INCL MRA W/O CONTRAST    CLINICAL HISTORY:  CVA;    TECHNIQUE:  Multiplanar multisequence MR imaging of the brain was performed without the use of intravenous contrast.    Obtained via the same acquisition from the MRI brain, a time-of-flight MR arteriogram of the intracranial vasculature with multiple MIPS reconstructions.    Images were obtained there brain ischemic protocol.    COMPARISON:  CTA 06/19/2022    FINDINGS:  Technically limited exam due to unavailability of MRI head coil.    Brain:    Ventricles and sulci are stable in size and configuration.  Redemonstration of stable right subdural collection.    Linear area of diffusion restriction along the right corona radiata suggestive of acute infarct.  Confluent  supratentorial periventricular white matter T2/FLAIR signal suggestive of chronic microvascular ischemic change.  Remote bilateral cerebellar infarcts.  No parenchymal mass, hemorrhage, or edema.    Bone marrow signal intensity unremarkable.    MRA:    Distal internal carotid arteries are normal in caliber.  No significant stenosis at either carotid bifurcation.    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  The basilar artery appears within normal limits.    SAMMI trifurcation.  The ACAs, MCAs and PCAs demonstrate no evidence of  high-grade stenosis, focal occlusion or intracranial aneurysm.    Impression  Small linear area of diffusion restriction along the right coronary radiata, suggestive of acute infarct.  No large vessel occlusion.    Sequela of chronic microvascular ischemic changes.    Stable right subdural collection.    Remote bilateral cerebellar infarcts.    Dr. Culver discussed preliminary findings with Dr. Guzmán on 06/19/2022 at 22:00.    Electronically signed by resident: Anna Culver  Date:    06/19/2022  Time:    21:48    Electronically signed by: Flo Scott MD  Date:    06/19/2022  Time:    22:08    Vessel Imaging   CTA STROKE MULTI-PHASE 06/19/2022    Narrative  EXAMINATION:  CTA STROKE MULTI-PHASE    CLINICAL HISTORY:  Neuro deficit, acute, stroke suspected;    TECHNIQUE:  Axial CT images obtained throughout the region of the head after the administration of intravenous contrast.  CT angiogram was performed from through the cervical and intracranial vasculature during the IV bolus administration of 100mL of Omnipaque 350.  Multiplanar MPR and MIP reformats were performed.    Additional 2 delayed images of the intracranial vasculature was performed.    Rapid AI assessment were included.    COMPARISON:  CT 03/05/2022 12/13/2021    FINDINGS:  Evaluation is limited due to suboptimal contrast timing and unavailable noncontrast images.    CT head:    Ventricles size and  configuration are similar to prior.  There is stable 3 mm low-density subdural collection overlying the right frontal lobe, similar to prior.    The brain appears unchanged.  Supratentorial periventricular white matter hypoattenuation suggestive of chronic microvascular ischemic change.  Remote bilateral cerebellar infarcts.  Gray and white matter differentiation is preserved.  No parenchymal mass, hemorrhage, edema or major vascular distribution infarct.    Hyperostosis frontalis interna.  No depressed calvarial fracture.  Teeth hardware.  Paranasal sinuses and mastoid air cells are essentially clear.      CTA:    There is stable narrowing right common carotid origin.  Bilateral carotid bifurcation calcifications without significant narrowing, allowing study limitations.  There is significant cavernous and petrous segment calcifications bilaterally.  No significant stenosis.    Right vertebral artery origin is patent.  Proximal left vertebral artery is not visualized..  The cervical vertebral arteries are normal in course and caliber. Vertebrobasilar system is without focal abnormality.    The anterior, middle, and posterior cerebral arteries are within normal limits, without evidence of significant stenosis, focal occlusion, or intracranial aneurysm formation.    Head and neck soft tissue:    Enlarged thyroid with multiple nodules, dominant nodule within the right thyroid lobe extending to the superior mediastinum and measuring 6.5 cm, similar to prior.  There is a mass effect on with leftward shift of the mediastinal structures.    Leftward midline shift of the trachea due to large thyroid, similar to prior.  Ground-glass opacities and subsegmental atelectases within upper lung zones.    Advanced degenerative spine changes.  There is chronic osteomyelitis at the C6-C7 level.    Impression  Limited exam due to suboptimal contrast timing and unavailable noncontrast images.  Additional evaluation, as clinically  warranted    No major vascular distribution infarct or large vessel occlusion.    Stable 3 mm subdural collection overlying the right frontal lobe.    Bilateral distal ICAs atherosclerotic disease without significant stenosis.    Significant enlarged thyroid with multiple nodules, the dominant nodule within the right thyroid lobe is stable in size.  There is a stable significant mass effect on the and superior mediastinal structures with leftward shift.    Ground-glass opacities within the upper lung zones, could represent aspiration, infection, or noninfectious inflammatory process.    Chronic discitis-osteomyelitis at the C6-C7 level.    Additional findings as above.    Electronically signed by resident: Anna Culver  Date:    06/19/2022  Time:    20:40    Electronically signed by: Flo Scott MD  Date:    06/19/2022  Time:    21:48    Cardiac Imaging   Transthoracic echo (TTE) complete 06/20/2022    Interpretation Summary  · The left ventricle is normal in size with normal systolic function.  · The estimated ejection fraction is 55%.  · Normal left ventricular diastolic function.  · Mild-to-moderate mitral regurgitation.  · Normal right ventricular size with normal right ventricular systolic function.  · Trivial posterolateral pericardial effusion.        Cliff Monson MD  Comprehensive Stroke Center  Department of Vascular Neurology   [facility])

## 2022-06-21 NOTE — CONSULTS
Francisco Lyons - Neurosurgery ([facility])  Adult Nutrition  Consult Note    SUMMARY     Recommendations    1. Continue current cardiac diet- encourage adequate PO intake.   2. Add Boost Plus TID- any flavor.   3. RD following.    Goals: Will meet % EEN/EPN by next RD f/u.  Nutrition Goal Status: new  Communication of RD Recs: (POC)    Assessment and Plan    Moderate Protein-Calorie Malnutrition    Nutrition Problem  Non-severe (moderate) malnutrition in the context of acute illness/injury    Related to (etiology):   Decreased appetite and inadequate energy/protein intake    Signs and Symptoms (as evidenced by):   Energy intake: suspected <75% estimated energy requirement for > 7 days  Body Fat Depletion: mild depletion in orbital and upper arm region  Muscle Mass Depletion: mild depletion in temporal and calf region  Wt Loss: 7.9% x 3 months per chart review    Interventions/Recommendations (treatment strategy):  Collaboration of nutrition care w/ other providers  [facility]    Nutrition Diagnosis Status:   New    Malnutrition Assessment  Malnutrition Type: acute illness or injury  Energy Intake: moderate energy intake          Weight Loss (Malnutrition): greater than 7.5% in 3 months  Energy Intake (Malnutrition): less than 75% for greater than 7 days (suspected)  Subcutaneous Fat (Malnutrition): mild depletion  Muscle Mass (Malnutrition): mild depletion   Orbital Region (Subcutaneous Fat Loss): mild depletion  Upper Arm Region (Subcutaneous Fat Loss): mild depletion   Evangelical Region (Muscle Loss): mild depletion  Posterior Calf Region (Muscle Loss): mild depletion       Subcutaneous Fat Loss (Final Summary): mild protein-calorie malnutrition  Muscle Loss Evaluation (Final Summary): mild protein-calorie malnutrition    Severe Weight Loss (Malnutrition): greater than 5% in 1 month    Reason for Assessment  Reason For Assessment: consult  Diagnosis: other (see comments) (Cerebral infarction d/t thrombosis of  "rigth middle cerebral artery)  Relevant Medical History: HTN, PAF, HLD, CKD III, vascular dementia  Interdisciplinary Rounds: did not attend  General Information Comments: RD consulted for stroke pathway. Spoke w/ pt at bedside. States PO intake PTA was 100% and states po intake now is 25%. Pt verbally agreed to receving ONS TID. Pt reports no issues w/ n/v/d/c, chewing, or swallowing. Pt does not know UBW but aware wt loss has occurred. Per chart review wt on 3/5/22 was 151 # which indicates 7.9% wt loss x 3 months. NFPE completed 6/21 and found mild muscle and fat wasting. Pt meets criteria for moderate malnutrition in the context of acute illness/injury- see PES for details. LBM noted- 6/20.  Nutrition Discharge Planning: Cardiac diet    Nutrition Risk Screen  Nutrition Risk Screen: no indicators present    Nutrition/Diet History  Spiritual, Cultural Beliefs, Yazdanism Practices, Values that Affect Care: no  Food Allergies: NKFA  Factors Affecting Nutritional Intake: decreased appetite    Anthropometrics  Temp: 98.6 °F (37 °C)  Height: 5' 4" (162.6 cm)  Height (inches): 64 in  Weight: 63.5 kg (139 lb 15.9 oz)  Weight (lb): 139.99 lb  Ideal Body Weight (IBW), Female: 120 lb  % Ideal Body Weight, Female (lb): 116.66 %  BMI (Calculated): 24  BMI Grade: 18.5-24.9 - normal  Weight Loss: unintentional     Lab/Procedures/Meds  Pertinent Labs Reviewed: reviewed  Pertinent Labs Comments: ALT 9, Hgb 11.3  Pertinent Medications Reviewed: reviewed  Pertinent Medications Comments: amlodipine, atorvastatin, famotidine, furosemide, lacosamide, gabapentin, vit D     Estimated/Assessed Needs  Weight Used For Calorie Calculations: 63.5 kg (139 lb 15.9 oz)  Energy Calorie Requirements (kcal): 1587 kcal  Energy Need Method: Kcal/kg (25 kcal/kg)  Protein Requirements: 70 g (1.2 g/kg)  Weight Used For Protein Calculations: 63.5 kg (139 lb 15.9 oz)  Fluid Requirements (mL): 1 ml of fluid or fluid per MD  Estimated Fluid Requirement " Method: RDA Method  RDA Method (mL): 1587     Nutrition Prescription Ordered  Current Diet Order: Cardiac    Evaluation of Received Nutrient/Fluid Intake  I/O: -  Energy Calories Required: not meeting needs  Protein Required: not meeting needs  Fluid Required: not meeting needs  Tolerance: tolerating  % Intake of Estimated Energy Needs: 25%  % Meal Intake: 25%    Nutrition Risk  Level of Risk/Frequency of Follow-up:  (1 time/week)     Monitor and Evaluation  Food and Nutrient Intake: energy intake, food and beverage intake  Food and Nutrient Adminstration: diet order  Knowledge/Beliefs/Attitudes: food and nutrition knowledge/skill, beliefs and attitudes  Physical Activity and Function: nutrition-related ADLs and IADLs  Anthropometric Measurements: height/length, weight, weight change, body mass index  Biochemical Data, Medical Tests and Procedures: electrolyte and renal panel, glucose/endocrine profile, gastrointestinal profile, inflammatory profile, lipid profile  Nutrition-Focused Physical Findings: overall appearance       Nutrition Follow-Up    RD Follow-up?: Yes     Mira Heath PL-LDN

## 2022-06-21 NOTE — SUBJECTIVE & OBJECTIVE
Neurologic Chief Complaint: Lacunar stroke    Subjective:     Interval History: Patient is seen for follow-up neurological assessment and treatment recommendations:     Patient agitated overnight; managed with Seroquel. Vital signs are stable and within normal limits. Patient is conscious and comfortable. PT/OT recommending inpatient rehab. Patient amenable.     HPI, Past Medical, Family, and Social History remains the same as documented in the initial encounter.     Review of Systems   Constitutional:  Positive for activity change and fatigue. Negative for fever.   HENT:  Negative for facial swelling and hearing loss.    Eyes:  Negative for pain, discharge and visual disturbance.   Respiratory:  Negative for cough and shortness of breath.    Cardiovascular:  Negative for chest pain and palpitations.   Gastrointestinal:  Negative for abdominal pain and nausea.   Musculoskeletal:  Positive for arthralgias. Negative for neck pain.   Skin:  Negative for color change and rash.   Neurological:  Positive for weakness. Negative for facial asymmetry, speech difficulty and numbness.   Psychiatric/Behavioral:  Negative for agitation and confusion.      Scheduled Meds:   amLODIPine  10 mg Oral Daily    apixaban  5 mg Oral BID    atorvastatin  40 mg Oral Daily    calcium carbonate  1,000 mg Oral Daily    famotidine  20 mg Oral BID    furosemide  40 mg Oral Every other day    gabapentin  100 mg Oral BID    gabapentin  300 mg Oral QHS    hydrOXYchloroQUINE  200 mg Oral BID    lacosamide  100 mg Oral Q12H    mycophenolate  500 mg Oral BID    QUEtiapine  12.5 mg Oral QHS    vitamin D  2,000 Units Oral Daily     Continuous Infusions:   sodium chloride 0.9%       PRN Meds:acetaminophen, acetaminophen, albuterol, baclofen, labetaloL, sodium chloride 0.9%, sodium chloride 0.9%    Objective:     Vital Signs (Most Recent):  Temp: 98.6 °F (37 °C) (06/21/22 1145)  Pulse: 91 (06/21/22 1145)  Resp: 16 (06/21/22 1145)  BP: 119/61 (06/21/22  1145)  SpO2: (!) 91 % (06/21/22 1145)  BP Location: Left arm    Vital Signs Range (Last 24H):  Temp:  [97 °F (36.1 °C)-98.6 °F (37 °C)]   Pulse:  []   Resp:  [16-20]   BP: (119-171)/(61-82)   SpO2:  [91 %-100 %]   BP Location: Left arm    Physical Exam  Vitals reviewed.   Constitutional:       General: She is not in acute distress.     Appearance: Normal appearance. She is normal weight. She is not ill-appearing.   HENT:      Head: Normocephalic and atraumatic.      Mouth/Throat:      Mouth: Mucous membranes are moist.      Pharynx: Oropharynx is clear.   Eyes:      General:         Right eye: No discharge.         Left eye: No discharge.      Extraocular Movements: Extraocular movements intact.      Conjunctiva/sclera: Conjunctivae normal.      Pupils: Pupils are equal, round, and reactive to light.   Cardiovascular:      Rate and Rhythm: Normal rate.   Pulmonary:      Effort: Pulmonary effort is normal. No respiratory distress.   Neurological:      Mental Status: She is alert.      Motor: Weakness present.       Neurological Exam:   LOC: alert  Attention Span: Good   Language: No aphasia  Articulation: No dysarthria  Orientation: Person, Place, Time   Visual Fields: Full  EOM (CN III, IV, VI): Full/intact  Pupils (CN II, III): PERRL  Facial Sensation (CN V): Normal  Facial Movement (CN VII): Symmetric facial expression    Motor: Arm left  Paresis: 3/5; extensor weakness  Leg left  Paresis: 3-/5; flexor weakness  Arm right  Normal 5/5  Leg right Normal 5/5  Cerebellum: No evidence of appendicular or axial ataxia, cannot perform on left due to weakness  Sensation: Intact to light touch and temperature    Laboratory:  CMP:   Recent Labs   Lab 06/21/22  0838   CALCIUM 9.4   ALBUMIN 3.8   PROT 6.9      K 4.2   CO2 24      BUN 10   CREATININE 0.8   ALKPHOS 66   ALT 9*   AST 12   BILITOT 1.4*       CBC:   Recent Labs   Lab 06/21/22  0838   WBC 6.93   RBC 4.68   HGB 11.3*   HCT 38.0   *   MCV 81*    MCH 24.1*   MCHC 29.7*       Lipid Panel:   Recent Labs   Lab 06/19/22 2045   CHOL 120   LDLCALC 45.0*   HDL 65   TRIG 50       Coagulation:   Recent Labs   Lab 06/20/22  0453   INR 1.1   APTT 23.4       Platelet Aggregation Study: No results for input(s): PLTAGG, PLTAGINTERP, PLTAGREGLACO, ADPPLTAGGREG in the last 168 hours.  Hgb A1C:   Recent Labs   Lab 06/20/22  0226   HGBA1C 5.3       TSH:   Recent Labs   Lab 06/19/22 2045   TSH 2.565         Diagnostic Results     Brain Imaging   MRI Brain Ischemic Inter Pro Incl MRA W/O Con 06/19/2022    Narrative  EXAMINATION:  MRI BRAIN ISCHEMIC INTERVENTIONAL PROTOCOL INCL MRA W/O CONTRAST    CLINICAL HISTORY:  CVA;    TECHNIQUE:  Multiplanar multisequence MR imaging of the brain was performed without the use of intravenous contrast.    Obtained via the same acquisition from the MRI brain, a time-of-flight MR arteriogram of the intracranial vasculature with multiple MIPS reconstructions.    Images were obtained there brain ischemic protocol.    COMPARISON:  CTA 06/19/2022    FINDINGS:  Technically limited exam due to unavailability of MRI head coil.    Brain:    Ventricles and sulci are stable in size and configuration.  Redemonstration of stable right subdural collection.    Linear area of diffusion restriction along the right corona radiata suggestive of acute infarct.  Confluent supratentorial periventricular white matter T2/FLAIR signal suggestive of chronic microvascular ischemic change.  Remote bilateral cerebellar infarcts.  No parenchymal mass, hemorrhage, or edema.    Bone marrow signal intensity unremarkable.    MRA:    Distal internal carotid arteries are normal in caliber.  No significant stenosis at either carotid bifurcation.    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  The basilar artery appears within normal limits.    SAMMI trifurcation.  The ACAs, MCAs and PCAs demonstrate no evidence of  high-grade stenosis, focal occlusion or  intracranial aneurysm.    Impression  Small linear area of diffusion restriction along the right coronary radiata, suggestive of acute infarct.  No large vessel occlusion.    Sequela of chronic microvascular ischemic changes.    Stable right subdural collection.    Remote bilateral cerebellar infarcts.    Dr. Culver discussed preliminary findings with Dr. Guzmán on 06/19/2022 at 22:00.    Electronically signed by resident: Anna Culver  Date:    06/19/2022  Time:    21:48    Electronically signed by: Flo Scott MD  Date:    06/19/2022  Time:    22:08    Vessel Imaging   CTA STROKE MULTI-PHASE 06/19/2022    Narrative  EXAMINATION:  CTA STROKE MULTI-PHASE    CLINICAL HISTORY:  Neuro deficit, acute, stroke suspected;    TECHNIQUE:  Axial CT images obtained throughout the region of the head after the administration of intravenous contrast.  CT angiogram was performed from through the cervical and intracranial vasculature during the IV bolus administration of 100mL of Omnipaque 350.  Multiplanar MPR and MIP reformats were performed.    Additional 2 delayed images of the intracranial vasculature was performed.    Rapid AI assessment were included.    COMPARISON:  CT 03/05/2022 12/13/2021    FINDINGS:  Evaluation is limited due to suboptimal contrast timing and unavailable noncontrast images.    CT head:    Ventricles size and configuration are similar to prior.  There is stable 3 mm low-density subdural collection overlying the right frontal lobe, similar to prior.    The brain appears unchanged.  Supratentorial periventricular white matter hypoattenuation suggestive of chronic microvascular ischemic change.  Remote bilateral cerebellar infarcts.  Gray and white matter differentiation is preserved.  No parenchymal mass, hemorrhage, edema or major vascular distribution infarct.    Hyperostosis frontalis interna.  No depressed calvarial fracture.  Teeth hardware.  Paranasal sinuses and mastoid air cells are  essentially clear.      CTA:    There is stable narrowing right common carotid origin.  Bilateral carotid bifurcation calcifications without significant narrowing, allowing study limitations.  There is significant cavernous and petrous segment calcifications bilaterally.  No significant stenosis.    Right vertebral artery origin is patent.  Proximal left vertebral artery is not visualized..  The cervical vertebral arteries are normal in course and caliber. Vertebrobasilar system is without focal abnormality.    The anterior, middle, and posterior cerebral arteries are within normal limits, without evidence of significant stenosis, focal occlusion, or intracranial aneurysm formation.    Head and neck soft tissue:    Enlarged thyroid with multiple nodules, dominant nodule within the right thyroid lobe extending to the superior mediastinum and measuring 6.5 cm, similar to prior.  There is a mass effect on with leftward shift of the mediastinal structures.    Leftward midline shift of the trachea due to large thyroid, similar to prior.  Ground-glass opacities and subsegmental atelectases within upper lung zones.    Advanced degenerative spine changes.  There is chronic osteomyelitis at the C6-C7 level.    Impression  Limited exam due to suboptimal contrast timing and unavailable noncontrast images.  Additional evaluation, as clinically warranted    No major vascular distribution infarct or large vessel occlusion.    Stable 3 mm subdural collection overlying the right frontal lobe.    Bilateral distal ICAs atherosclerotic disease without significant stenosis.    Significant enlarged thyroid with multiple nodules, the dominant nodule within the right thyroid lobe is stable in size.  There is a stable significant mass effect on the and superior mediastinal structures with leftward shift.    Ground-glass opacities within the upper lung zones, could represent aspiration, infection, or noninfectious inflammatory  process.    Chronic discitis-osteomyelitis at the C6-C7 level.    Additional findings as above.    Electronically signed by resident: Anna Culver  Date:    06/19/2022  Time:    20:40    Electronically signed by: Flo Scott MD  Date:    06/19/2022  Time:    21:48    Cardiac Imaging   Transthoracic echo (TTE) complete 06/20/2022    Interpretation Summary  · The left ventricle is normal in size with normal systolic function.  · The estimated ejection fraction is 55%.  · Normal left ventricular diastolic function.  · Mild-to-moderate mitral regurgitation.  · Normal right ventricular size with normal right ventricular systolic function.  · Trivial posterolateral pericardial effusion.

## 2022-06-21 NOTE — PLAN OF CARE
Recommendations    1. Continue current cardiac diet- encourage adequate PO intake.   2. Add Boost Plus TID- any flavor.   3. RD following.    Goals: Will meet % EEN/EPN by next RD f/u.  Nutrition Goal Status: new  Communication of RD Recs: (POC)      Mira Heath PL-LDN

## 2022-06-21 NOTE — PT/OT/SLP PROGRESS
"Physical Therapy Treatment    Patient Name:  Selma Lux   MRN:  [id number]    Recommendations:     Discharge Recommendations:  rehabilitation facility   Discharge Equipment Recommendations: other (see comments) (TBD)   Barriers to discharge: Inaccessible home, impaired functional mobility, and high risk for falls    Assessment:     Selma Lux is a 86 y.o. 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  She presents with the following impairments/functional limitations:  weakness, impaired endurance, impaired sensation, impaired functional mobilty, gait instability, impaired balance, decreased coordination, decreased upper extremity function, decreased lower extremity function, impaired fine motor. Pt tolerated treatment well focusing on bed mobility and transfers. Pt is progressing towards goals. Pt remains a high risk for falls d/t to increased level of assistance needed and impaired balance. Pt performed sit <> stands, with total assistance needed and blocking of B LE. Pt demonstrates LUE weakness and B LE weakness. Pt was compliant and willing to perform PT this session. Pt will continue to benefit from continued therapy services to address impairments listed above.    Rehab Prognosis: Good; patient would benefit from acute skilled PT services to address these deficits and reach maximum level of function.    Recent Surgery: * No surgery found *      Plan:     During this hospitalization, patient to be seen 4 x/week to address the identified rehab impairments via gait training, therapeutic activities, therapeutic exercises, neuromuscular re-education and progress toward the following goals:    · Plan of Care Expires:  07/20/22    Subjective     Chief Complaint: n/a  Patient/Family Comments/goals: " I'm feeling fine."  Pain/Comfort:  · Pain Rating 1: 0/10      Objective:     Communicated with RN prior to session.  Patient found HOB " elevated with telemetry, [employer] upon SPTA entry to room.     General Precautions: Standard, fall   Orthopedic Precautions:N/A   Braces: N/A  Respiratory Status: Room air     Functional Mobility:  · Bed Mobility:     · Rolling Left:  moderate assistance, management of LUE and BLE  · Supine to Sit: moderate assistance, management of LUE and BLE  · Sit to Supine: maximal assistance, management of LUE and BLE  · Transfers:     · Sit to Stand:  moderate assistance with no AD x 1 trial, min assistance x 1 trial , B knee blocked, gait belt used, LUE supported across therapist arms  · Balance:   · Static Sitting Balance: mod A at EOB,   · Pt demonstrated L posterolateral lean     AM-PAC 6 CLICK MOBILITY  Turning over in bed (including adjusting bedclothes, sheets and blankets)?: 3  Sitting down on and standing up from a chair with arms (e.g., wheelchair, bedside commode, etc.): 2  Moving from lying on back to sitting on the side of the bed?: 3  Moving to and from a bed to a chair (including a wheelchair)?: 1  Need to walk in hospital room?: 1  Climbing 3-5 steps with a railing?: 1  Basic Mobility Total Score: 11       Therapeutic Activities and Exercises:  Pt educated on functional mobility noted above.  Pt and caregiver educated on level assistance needed, progress towards goals and PT POC.  Pt educated on posture awareness when sitting at EOB.  Sit <>Stand x 2 trials, mod assistance with no AD 1st trial, 2nd trial min A with no AD, BLE blocked, LUE supported across therapist arms.    Patient left HOB elevated with all lines intact, call button in reach, bed alarm on, RN notified and caregiver present..    GOALS:   Multidisciplinary Problems     Physical Therapy Goals        Problem: Physical Therapy    Goal Priority Disciplines Outcome Goal Variances Interventions   Physical Therapy Goal     PT, PT/OT Ongoing, Progressing     Description: Goals to be met by: 7/4/22    Patient will increase functional independence with  mobility by performin. Supine to sit with supervision  2. Sit to supine with supervision  3. Rolling to Left and Right with supervision  4. Sit to stand transfer with minimum assistance  5. Bed to chair transfer with minimum assistance using LRAD as needed  6. Gait  x 10 feet with minimum assistance using LRAD as needed  7. Wheelchair propulsion x25 feet with contact guard assistance using bilateral uppper extremities  8. Ascend/descend 5 stair with bilateral handrails moderate assistance using LRAD as needed  9. Lower extremity exercise program x10 reps per handout, with supervision                     Time Tracking:     PT Received On: 22  PT Start Time: 1115     PT Stop Time: 1140  PT Total Time (min): 25 min     Billable Minutes: Therapeutic Activity 25    Treatment Type: Treatment  PT/PTA: PTA     PTA Visit Number: 1     2022

## 2022-06-21 NOTE — PT/OT/SLP EVAL
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    Recommendations:     Recommendations:                General Recommendations:  ongoing speech, language, cognitive evaluation to determine further ST needs- suspected to be returning to baseline with noted baseline cognitive-lingustic impairments from prior strokes and documented vascular dementia  Diet recommendations:  Regular, Thin   Aspiration Precautions:   · 1 small bite/sip at a time,   · Assistance with meals,   · Feed only when awake/alert,   · HOB to 90 degrees and   · Meds whole 1 at a time   General Precautions: Standard, fall  Communication strategies:  none    History:     MD note 6/20: * 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 female with PMHx prior stroke, AFib (on eliquis), HTN, HLD and TIA who presents to [facility] ED with left sided weakness. LKN 2300 6/18/2022. On evaluation patient is hypotensive with left sided hemiparesis. NIHSS 4. Labs CBC/CMP benign; mild Cr increase from priors. Imaging reviewed; CTA limited by contrast extravasation. CTH prelim read with hypodensity in MCA distribution, however, similar to prior CTH films. CTA without LVO. tPA not given as patient OOW and on anticoagulation. Ordered MRI brain acute ischemic protocol for further evaluation as patient is within thrombectomy window. MRI brain with small linear area of diffusion restriction along the right coronary radiata, suggestive of acute infarct. Stroke Etiology: Ischemic Small Vessel Disease (Lacunar). PT/OT recommending inpatient rehab; patient notes poor therapeutic relationships in the past and is considering outpatient rehab vs home health. Will discuss further.    Past Medical History:   Diagnosis Date    Acute cystitis without hematuria 2/27/2020    Acute  hypoxemic respiratory failure 4/11/2018    Acute respiratory failure with hypoxia 3/2/2020    Altered mental status     Anemia     Arthritis     Bilateral hand pain 3/14/2018    Branch retinal vein occlusion, left eye 2/20/2015    Chronic bilateral low back pain without sciatica 3/23/2017    Chronic renal failure in pediatric patient, stage 3 (moderate) 4/15/2018    Cognitive communication deficit 12/19/2017    Cystoid macular edema, left eye 2/20/2015    Cystoid macular edema, left eye 2/20/2015    Delirium 12/30/2021    DJD (degenerative joint disease) of cervical spine 5/15/2013    Enterococcal bacteremia     Fatty liver 8/26/2014    Goiter 4/9/2018    Hashimoto's disease     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    Hypertension     Hypertensive encephalopathy     IBS (irritable bowel syndrome) 6/21/2017    IGT (impaired glucose tolerance) 1/12/2016    Iron deficiency anemia secondary to inadequate dietary iron intake 6/24/2013    Joint pain     Keratoconjunctivitis sicca of both eyes not specified as Sjogren's 7/29/2016    Leiomyoma of uterus, unspecified 9/16/2014    Long QT interval 6/29/2016    Due to medication (plaquenil)     Macular edema 1/12/2015    Multinodular goiter 1/12/2016    Neuropathy 8/23/2017    Plaquenil causing adverse effect in therapeutic use 10/7/2016    Pneumococcal vaccine refused 8/17/2016    Pneumonia due to Streptococcus pneumoniae 4/5/2018    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/2015    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 (4TH FLR);  Service: Endoscopy;  Laterality: N/A;    EYE SURGERY      INJECTION OF ANESTHETIC AGENT AROUND NERVE Left 4/19/2021    Procedure: BLOCK, NERVE, FEMORAL AND OBTURATOR;  Surgeon: Shivam Gonzalez MD;  Location: [facility];  Service: Pain Management;  Laterality: Left;  consent needed    JOINT REPLACEMENT      right knee    KNEE SURGERY Left 12/31/2013    TKR    left parotidectomy      mixed tumor    RI EVAL,SWALLOW FUNCTION,CINE/VIDEO RECORD  6/5/2021         SALIVARY GLAND SURGERY      TONSILLECTOMY      UPPER GASTROINTESTINAL ENDOSCOPY       Social History: Patient lives with two daughters (per patient; no family present).    MRI 6/19: Small linear area of diffusion restriction along the right coronary radiata, suggestive of acute infarct.  No large vessel occlusion.  Sequela of chronic microvascular ischemic changes.  Stable right subdural collection.  Remote bilateral cerebellar infarcts.    Modified Barium Swallow 6/5/21:  ·  Mild to moderate oral and pharyngeal dysphagia due to:  ? Lingual incoordination  ? Delayed trigger of the swallow reflex  ? dcr retroflexion of the epiglottis during the swallow  ? dcr laryngeal elevation and closure  ? dcr tongue base retraction  ? dcr pharyngeal contraction  ? Reduce cricopharyngeal opening during swallow   General Recommendations:                1. Recommend return to outpatient speech pathology for remediation of oral and pharyngeal dysphagia, dysphagia based exercise program  Diet recommendations:   1. SOLIDS:  Regular,  2. LIQUIDS:  Thin   Aspiration Precautions:   1. Small 3-5 ml sips of thin liquids with chin tuck head posture  2. 2 swallows per sip of liquid  3. Small bites of food with chin in neutral to tucked position  4. 3-4 swallows per bite of food  5. Controlled oral swallow     Subjective     Patient sleeping upon SLP entry. No family present in room.  SLP assisted PCT with changing linens and repositioning  patient for breakfast tray.   Patient goals: none stated     Pain/Comfort:  ·  no pain reported or indicated    Respiratory Status: Room air    Objective:     Patient familiar to Speech Therapy department with a long history of prior ST. She received Outpatient Speech Therapy from December 2017 to March of 2018 for moderate cognitive-linguistic impairments. She was also seen during a SNF stay from 3/30/2020-4/13/2020 for swallowing and cognitive-linguistic impairments.  Per EMR, patient was seen during the following hospital admissions for swallowing, speech, language, and cognition:  · 4/2018-6/2018  · 12/18/2018-12/28/2018  · 8/27/2019-9/5/2019  · 12/14/2021-1/28/2022  Patient also received HH ST s/p multiple hospital admissions and was d/lou for plateauing with Speech Therapy. Patient was in agreement with d/ashlie.       COGNITIVE STATUS:  Behavioral Observations: distractible and lethargic  Memory:  · Immediate: WFL  · Short Term: impaired; negatively affected by lethargy; likely baseline impairments from prior strokes and documented vascular dementia  · Long Term: WFL  Orientation: impaired orientation to place, situation, and time; after patient was oriented to place and situation, she was able to recall information later in session. Patient with baseline difficulties with temporal orientation per chart review  Attention: impaired attention- patient with sustained and divided attention ST goals during prior hospital admissions.  Problem Solving: impaired problem solving and safety awareness-consistent with prior admissions  Sequencing:   · Functional Events: TBA; patient declined to participate in task this date  · Mental Manipulation: TBA; patient declined to participate in task this date  Pragmatics: patient lethargic; kept eyes closed for majority of session despite cues to open them  Simple Money/Time Management: TBA as appropriate    LANGUAGE:   Receptive Language:  Simple y/n Questions: wfl  Complex y/n  "Questions: wfl  Identification: wfl  1 Step Directions: wfl  2 Step Directions: TBA; patient declined to participate in task this date  Complex Directions: TBA; patient declined to participate in task this date    Expressive Language:   Naming:   · Divergent: TBA; patient declined to participate in task this date  · Convergent: wfl  · Confrontational: wfl  Automatic Speech: wfl  Sentence Completion: wfl  Responsive Speech: wfl  Repetition: wfl  Conversational Speech: tangential speech     Reading: TBA; patient declined to participate in task this date     Writing: TBA; patient declined to participate in task this date    Visual-Spatial: TBA    Treatment: Patient seen to assess tolerance of diet. Patient assessed with clinician fed bites of cereal and sips of milk via straw. She presents with mildly prolonged mastication, however, adequate oral clearance. No overt s/s of aspiration with all trials. SLP provided patient education on SLP recommendations, SLP role, s/s and risks of aspiration, safe swallow precautions, and POC. Patient in agreement to participate in further evaluation of cognition at a later time. She states, "I'm not doing that school stuff though."     Assessment:   Selma Lux is a 86 y.o. female with cognitive-linguisitc impairments. Patient suspected to be returning to baseline for speech, language, and cognition. Patient with baseline impairments from multiple prior strokes and documented vascular dementia. ST will follow up to determine further ST needs pending patient participation in further evaluation and family report of baseline vs. current presentation (no family present this date).     Goals:   Multidisciplinary Problems     SLP Goals        Problem: SLP    Goal Priority Disciplines Outcome   SLP Goal     SLP    Description: Speech-Language Pathology Goals  Goals to be met by 6/27/22  1. Patient will participate in ongoing swallow assessment in order to determine safest least " restrictive diet.   2. Patient will participate in a speech/language/cog eval. -ongoing                     Plan:   · Patient to be seen:  3 x/week   · Plan of Care expires:  07/20/22  · Plan of Care reviewed with:  patient   · SLP Follow-Up:  Yes       Discharge recommendations:  Discharge Facility/Level of Care Needs:  (OP ST vs. no needs)   Barriers to Discharge:  None    Time Tracking:   SLP Treatment Date:   06/21/22  Speech Start Time:  0740  Speech Stop Time:  0819     Speech Total Time (min):  39 min    Billable Minutes: Eval 15 , Eval Swallow and Oral Function 9 and Self Care/Home Management Training 15    06/21/2022

## 2022-06-21 NOTE — CARE UPDATE
Patient handed off to me and sign out given by Dr. Monson.    Patient transferred to NPU this evening from ED, patient increasingly more agitated and restless than she was this morning. Patient remains alert but is only oriented to self, majority of speech is nonsensical but she does occasionally respond appropriately. Patient's daughter is present at bedside, states this is new and not typical for patient.    Stat CTH stable compared to MRI brain from 6/19.  Seroquel 12.5mg nightly started for agitation and delirium        Millie Carrington PA-C  Department of Vascular Neurology  [facility]  [phone]

## 2022-06-22 LAB
ANISOCYTOSIS BLD QL SMEAR: SLIGHT
BASOPHILS # BLD AUTO: 0.02 K/UL (ref 0–0.2)
BASOPHILS NFR BLD: 0.2 % (ref 0–1.9)
BURR CELLS BLD QL SMEAR: ABNORMAL
DIFFERENTIAL METHOD: ABNORMAL
EOSINOPHIL # BLD AUTO: 0.2 K/UL (ref 0–0.5)
EOSINOPHIL NFR BLD: 2 % (ref 0–8)
ERYTHROCYTE [DISTWIDTH] IN BLOOD BY AUTOMATED COUNT: 17.5 % (ref 11.5–14.5)
FERRITIN SERPL-MCNC: 95 NG/ML (ref 20–300)
HCT VFR BLD AUTO: 35 % (ref 37–48.5)
HGB BLD-MCNC: 10.4 G/DL (ref 12–16)
HYPOCHROMIA BLD QL SMEAR: ABNORMAL
IMM GRANULOCYTES # BLD AUTO: 0.04 K/UL (ref 0–0.04)
IMM GRANULOCYTES NFR BLD AUTO: 0.4 % (ref 0–0.5)
IRON SERPL-MCNC: 22 UG/DL (ref 30–160)
LYMPHOCYTES # BLD AUTO: 3 K/UL (ref 1–4.8)
LYMPHOCYTES NFR BLD: 30.7 % (ref 18–48)
MCH RBC QN AUTO: 23.7 PG (ref 27–31)
MCHC RBC AUTO-ENTMCNC: 29.7 G/DL (ref 32–36)
MCV RBC AUTO: 80 FL (ref 82–98)
MONOCYTES # BLD AUTO: 2.7 K/UL (ref 0.3–1)
MONOCYTES NFR BLD: 28.4 % (ref 4–15)
NEUTROPHILS # BLD AUTO: 3.7 K/UL (ref 1.8–7.7)
NEUTROPHILS NFR BLD: 38.3 % (ref 38–73)
NRBC BLD-RTO: 0 /100 WBC
OVALOCYTES BLD QL SMEAR: ABNORMAL
PLATELET # BLD AUTO: 112 K/UL (ref 150–450)
PMV BLD AUTO: 10.9 FL (ref 9.2–12.9)
POIKILOCYTOSIS BLD QL SMEAR: SLIGHT
POLYCHROMASIA BLD QL SMEAR: ABNORMAL
RBC # BLD AUTO: 4.39 M/UL (ref 4–5.4)
SATURATED IRON: 5 % (ref 20–50)
SCHISTOCYTES BLD QL SMEAR: ABNORMAL
TOTAL IRON BINDING CAPACITY: 417 UG/DL (ref 250–450)
TRANSFERRIN SERPL-MCNC: 282 MG/DL (ref 200–375)
WBC # BLD AUTO: 9.66 K/UL (ref 3.9–12.7)

## 2022-06-22 PROCEDURE — 11000001 HC ACUTE MED/SURG PRIVATE ROOM

## 2022-06-22 PROCEDURE — 99233 PR SUBSEQUENT HOSPITAL CARE,LEVL III: ICD-10-PCS | Mod: ,,, | Performed by: PSYCHIATRY & NEUROLOGY

## 2022-06-22 PROCEDURE — 97535 SELF CARE MNGMENT TRAINING: CPT

## 2022-06-22 PROCEDURE — 36415 COLL VENOUS BLD VENIPUNCTURE: CPT | Performed by: STUDENT IN AN ORGANIZED HEALTH CARE EDUCATION/TRAINING PROGRAM

## 2022-06-22 PROCEDURE — 94761 N-INVAS EAR/PLS OXIMETRY MLT: CPT

## 2022-06-22 PROCEDURE — 99233 SBSQ HOSP IP/OBS HIGH 50: CPT | Mod: ,,, | Performed by: PSYCHIATRY & NEUROLOGY

## 2022-06-22 PROCEDURE — 25000003 PHARM REV CODE 250: Performed by: STUDENT IN AN ORGANIZED HEALTH CARE EDUCATION/TRAINING PROGRAM

## 2022-06-22 PROCEDURE — 97112 NEUROMUSCULAR REEDUCATION: CPT

## 2022-06-22 PROCEDURE — 25000003 PHARM REV CODE 250: Performed by: NURSE PRACTITIONER

## 2022-06-22 PROCEDURE — 85025 COMPLETE CBC W/AUTO DIFF WBC: CPT | Performed by: STUDENT IN AN ORGANIZED HEALTH CARE EDUCATION/TRAINING PROGRAM

## 2022-06-22 PROCEDURE — 63600175 PHARM REV CODE 636 W HCPCS: Performed by: STUDENT IN AN ORGANIZED HEALTH CARE EDUCATION/TRAINING PROGRAM

## 2022-06-22 RX ORDER — LACTULOSE 10 G/15ML
15 SOLUTION ORAL ONCE
Status: COMPLETED | OUTPATIENT
Start: 2022-06-22 | End: 2022-06-22

## 2022-06-22 RX ORDER — FAMOTIDINE 20 MG/1
20 TABLET, FILM COATED ORAL DAILY
Status: DISCONTINUED | OUTPATIENT
Start: 2022-06-22 | End: 2022-06-22

## 2022-06-22 RX ADMIN — MYCOPHENOLATE MOFETIL 500 MG: 250 CAPSULE ORAL at 09:06

## 2022-06-22 RX ADMIN — GABAPENTIN 100 MG: 100 CAPSULE ORAL at 09:06

## 2022-06-22 RX ADMIN — LACOSAMIDE 100 MG: 10 SOLUTION ORAL at 09:06

## 2022-06-22 RX ADMIN — AMLODIPINE BESYLATE 10 MG: 10 TABLET ORAL at 09:06

## 2022-06-22 RX ADMIN — APIXABAN 5 MG: 5 TABLET, FILM COATED ORAL at 08:06

## 2022-06-22 RX ADMIN — APIXABAN 5 MG: 5 TABLET, FILM COATED ORAL at 09:06

## 2022-06-22 RX ADMIN — LACTULOSE 15 G: 20 SOLUTION ORAL at 11:06

## 2022-06-22 RX ADMIN — CALCIUM CARBONATE (ANTACID) CHEW TAB 500 MG 1000 MG: 500 CHEW TAB at 09:06

## 2022-06-22 RX ADMIN — GABAPENTIN 300 MG: 300 CAPSULE ORAL at 08:06

## 2022-06-22 RX ADMIN — HYDROXYCHLOROQUINE SULFATE 200 MG: 200 TABLET, FILM COATED ORAL at 08:06

## 2022-06-22 RX ADMIN — HYDROXYCHLOROQUINE SULFATE 200 MG: 200 TABLET, FILM COATED ORAL at 09:06

## 2022-06-22 RX ADMIN — GABAPENTIN 100 MG: 100 CAPSULE ORAL at 08:06

## 2022-06-22 RX ADMIN — ATORVASTATIN CALCIUM 40 MG: 40 TABLET, FILM COATED ORAL at 09:06

## 2022-06-22 RX ADMIN — MYCOPHENOLATE MOFETIL 500 MG: 250 CAPSULE ORAL at 08:06

## 2022-06-22 RX ADMIN — Medication 2000 UNITS: at 09:06

## 2022-06-22 NOTE — SUBJECTIVE & OBJECTIVE
Neurologic Chief Complaint: Lacunar stroke    Subjective:     Interval History: Patient is seen for follow-up neurological assessment and treatment recommendations:     No acute or concerning events noted by overnight staff. Vital signs are stable and within normal limits. Patient is conscious and comfortable. PT/OT recommending inpatient rehab. Patient amenable. Labs notable for thrombocytopenia in low 100s; possible adverse effect of famotidine. Discontinuing.    HPI, Past Medical, Family, and Social History remains the same as documented in the initial encounter.     Review of Systems   Constitutional:  Positive for activity change and fatigue. Negative for fever.   HENT:  Negative for facial swelling and hearing loss.    Eyes:  Negative for pain, discharge and visual disturbance.   Respiratory:  Negative for cough and shortness of breath.    Cardiovascular:  Negative for chest pain and palpitations.   Gastrointestinal:  Negative for abdominal pain and nausea.   Musculoskeletal:  Positive for arthralgias. Negative for neck pain.   Skin:  Negative for color change and rash.   Neurological:  Positive for weakness. Negative for facial asymmetry, speech difficulty and numbness.   Psychiatric/Behavioral:  Negative for agitation and confusion.      Scheduled Meds:   amLODIPine  10 mg Oral Daily    apixaban  5 mg Oral BID    atorvastatin  40 mg Oral Daily    calcium carbonate  1,000 mg Oral Daily    furosemide  40 mg Oral Every other day    gabapentin  100 mg Oral BID    gabapentin  300 mg Oral QHS    hydrOXYchloroQUINE  200 mg Oral BID    mycophenolate  500 mg Oral BID    vitamin D  2,000 Units Oral Daily     Continuous Infusions:   sodium chloride 0.9%       PRN Meds:acetaminophen, acetaminophen, albuterol, baclofen, labetaloL, sodium chloride 0.9%, sodium chloride 0.9%    Objective:     Vital Signs (Most Recent):  Temp: 98.1 °F (36.7 °C) (06/22/22 1231)  Pulse: 95 (06/22/22 1231)  Resp: 18 (06/22/22 1231)  BP: 108/87  (06/22/22 1231)  SpO2: 98 % (06/22/22 1231)  BP Location: Right arm    Vital Signs Range (Last 24H):  Temp:  [97.9 °F (36.6 °C)-98.7 °F (37.1 °C)]   Pulse:  [72-99]   Resp:  [16-18]   BP: (103-142)/(55-87)   SpO2:  [95 %-98 %]   BP Location: Right arm    Physical Exam  Vitals reviewed.   Constitutional:       General: She is not in acute distress.     Appearance: Normal appearance. She is normal weight. She is not ill-appearing.   HENT:      Head: Normocephalic and atraumatic.      Mouth/Throat:      Mouth: Mucous membranes are moist.      Pharynx: Oropharynx is clear.   Eyes:      General:         Right eye: No discharge.         Left eye: No discharge.      Extraocular Movements: Extraocular movements intact.      Conjunctiva/sclera: Conjunctivae normal.      Pupils: Pupils are equal, round, and reactive to light.   Cardiovascular:      Rate and Rhythm: Normal rate.   Pulmonary:      Effort: Pulmonary effort is normal. No respiratory distress.   Neurological:      Mental Status: She is alert.      Motor: Weakness present.       Neurological Exam:   LOC: alert  Attention Span: Good   Language: No aphasia  Articulation: No dysarthria  Orientation: Person, Place, Time   Visual Fields: Full  EOM (CN III, IV, VI): Full/intact  Pupils (CN II, III): PERRL  Facial Sensation (CN V): Normal  Facial Movement (CN VII): Symmetric facial expression    Motor: Arm left  Paresis: 3-/5; extensor weakness  Leg left  Paresis: 3-/5; flexor weakness  Arm right  Normal 5/5  Leg right Normal 5/5  Cerebellum: No evidence of appendicular or axial ataxia, cannot perform on left due to weakness  Sensation: Intact to light touch and temperature    Laboratory:  CMP:   No results for input(s): GLUCOSE, CALCIUM, ALBUMIN, PROT, NA, K, CO2, CL, BUN, CREATININE, ALKPHOS, ALT, AST, BILITOT in the last 24 hours.    CBC:   Recent Labs   Lab 06/22/22  0838   WBC 9.66   RBC 4.39   HGB 10.4*   HCT 35.0*   *   MCV 80*   MCH 23.7*   MCHC 29.7*        Lipid Panel:   Recent Labs   Lab 06/19/22 2045   CHOL 120   LDLCALC 45.0*   HDL 65   TRIG 50       Coagulation:   Recent Labs   Lab 06/20/22  0453   INR 1.1   APTT 23.4       Platelet Aggregation Study: No results for input(s): PLTAGG, PLTAGINTERP, PLTAGREGLACO, ADPPLTAGGREG in the last 168 hours.  Hgb A1C:   Recent Labs   Lab 06/20/22  0226   HGBA1C 5.3       TSH:   Recent Labs   Lab 06/19/22 2045   TSH 2.565         Diagnostic Results     Brain Imaging   MRI Brain Ischemic Inter Pro Incl MRA W/O Con 06/19/2022    Narrative  EXAMINATION:  MRI BRAIN ISCHEMIC INTERVENTIONAL PROTOCOL INCL MRA W/O CONTRAST    CLINICAL HISTORY:  CVA;    TECHNIQUE:  Multiplanar multisequence MR imaging of the brain was performed without the use of intravenous contrast.    Obtained via the same acquisition from the MRI brain, a time-of-flight MR arteriogram of the intracranial vasculature with multiple MIPS reconstructions.    Images were obtained there brain ischemic protocol.    COMPARISON:  CTA 06/19/2022    FINDINGS:  Technically limited exam due to unavailability of MRI head coil.    Brain:    Ventricles and sulci are stable in size and configuration.  Redemonstration of stable right subdural collection.    Linear area of diffusion restriction along the right corona radiata suggestive of acute infarct.  Confluent supratentorial periventricular white matter T2/FLAIR signal suggestive of chronic microvascular ischemic change.  Remote bilateral cerebellar infarcts.  No parenchymal mass, hemorrhage, or edema.    Bone marrow signal intensity unremarkable.    MRA:    Distal internal carotid arteries are normal in caliber.  No significant stenosis at either carotid bifurcation.    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  The basilar artery appears within normal limits.    SAMMI trifurcation.  The ACAs, MCAs and PCAs demonstrate no evidence of  high-grade stenosis, focal occlusion or intracranial  aneurysm.    Impression  Small linear area of diffusion restriction along the right coronary radiata, suggestive of acute infarct.  No large vessel occlusion.    Sequela of chronic microvascular ischemic changes.    Stable right subdural collection.    Remote bilateral cerebellar infarcts.    Dr. Culver discussed preliminary findings with Dr. Guzmán on 06/19/2022 at 22:00.    Electronically signed by resident: Anna Culver  Date:    06/19/2022  Time:    21:48    Electronically signed by: Flo Scott MD  Date:    06/19/2022  Time:    22:08    Vessel Imaging   CTA STROKE MULTI-PHASE 06/19/2022    Narrative  EXAMINATION:  CTA STROKE MULTI-PHASE    CLINICAL HISTORY:  Neuro deficit, acute, stroke suspected;    TECHNIQUE:  Axial CT images obtained throughout the region of the head after the administration of intravenous contrast.  CT angiogram was performed from through the cervical and intracranial vasculature during the IV bolus administration of 100mL of Omnipaque 350.  Multiplanar MPR and MIP reformats were performed.    Additional 2 delayed images of the intracranial vasculature was performed.    Rapid AI assessment were included.    COMPARISON:  CT 03/05/2022 12/13/2021    FINDINGS:  Evaluation is limited due to suboptimal contrast timing and unavailable noncontrast images.    CT head:    Ventricles size and configuration are similar to prior.  There is stable 3 mm low-density subdural collection overlying the right frontal lobe, similar to prior.    The brain appears unchanged.  Supratentorial periventricular white matter hypoattenuation suggestive of chronic microvascular ischemic change.  Remote bilateral cerebellar infarcts.  Gray and white matter differentiation is preserved.  No parenchymal mass, hemorrhage, edema or major vascular distribution infarct.    Hyperostosis frontalis interna.  No depressed calvarial fracture.  Teeth hardware.  Paranasal sinuses and mastoid air cells are essentially  clear.      CTA:    There is stable narrowing right common carotid origin.  Bilateral carotid bifurcation calcifications without significant narrowing, allowing study limitations.  There is significant cavernous and petrous segment calcifications bilaterally.  No significant stenosis.    Right vertebral artery origin is patent.  Proximal left vertebral artery is not visualized..  The cervical vertebral arteries are normal in course and caliber. Vertebrobasilar system is without focal abnormality.    The anterior, middle, and posterior cerebral arteries are within normal limits, without evidence of significant stenosis, focal occlusion, or intracranial aneurysm formation.    Head and neck soft tissue:    Enlarged thyroid with multiple nodules, dominant nodule within the right thyroid lobe extending to the superior mediastinum and measuring 6.5 cm, similar to prior.  There is a mass effect on with leftward shift of the mediastinal structures.    Leftward midline shift of the trachea due to large thyroid, similar to prior.  Ground-glass opacities and subsegmental atelectases within upper lung zones.    Advanced degenerative spine changes.  There is chronic osteomyelitis at the C6-C7 level.    Impression  Limited exam due to suboptimal contrast timing and unavailable noncontrast images.  Additional evaluation, as clinically warranted    No major vascular distribution infarct or large vessel occlusion.    Stable 3 mm subdural collection overlying the right frontal lobe.    Bilateral distal ICAs atherosclerotic disease without significant stenosis.    Significant enlarged thyroid with multiple nodules, the dominant nodule within the right thyroid lobe is stable in size.  There is a stable significant mass effect on the and superior mediastinal structures with leftward shift.    Ground-glass opacities within the upper lung zones, could represent aspiration, infection, or noninfectious inflammatory process.    Chronic  discitis-osteomyelitis at the C6-C7 level.    Additional findings as above.    Electronically signed by resident: Anna Culver  Date:    06/19/2022  Time:    20:40    Electronically signed by: Flo Scott MD  Date:    06/19/2022  Time:    21:48    Cardiac Imaging   Transthoracic echo (TTE) complete 06/20/2022    Interpretation Summary  · The left ventricle is normal in size with normal systolic function.  · The estimated ejection fraction is 55%.  · Normal left ventricular diastolic function.  · Mild-to-moderate mitral regurgitation.  · Normal right ventricular size with normal right ventricular systolic function.  · Trivial posterolateral pericardial effusion.

## 2022-06-22 NOTE — ASSESSMENT & PLAN NOTE
86 y.o. female with PMHx prior stroke, AFib (on eliquis), HTN, HLD and TIA who presents to [facility] ED with left sided weakness. LKN 2300 6/18/2022. On evaluation patient is hypotensive with left sided hemiparesis. NIHSS 4. Labs CBC/CMP benign; mild Cr increase from priors. Imaging reviewed; CTA limited by contrast extravasation. CTH prelim read with hypodensity in MCA distribution, however, similar to prior CTH films. CTA without LVO. tPA not given as patient OOW and on anticoagulation. Ordered MRI brain acute ischemic protocol for further evaluation as patient is within thrombectomy window. MRI brain with small linear area of diffusion restriction along the right coronary radiata, suggestive of acute infarct. Stroke Etiology: Ischemic Small Vessel Disease (Lacunar). PT/OT recommending inpatient rehab; patient agreeable.    Antithrombotics for secondary stroke prevention: Anticoagulants: Apixaban 5 mg BID     Statins for secondary stroke prevention and hyperlipidemia, if present:   Statins: Atorvastatin- 40 mg daily    Aggressive risk factor modification: HTN, HLD, Diet, Exercise, A-Fib     Rehab efforts: The patient has been evaluated by a stroke team provider and the therapy needs have been fully considered based off the presenting complaints and exam findings. The following therapy evaluations are needed: PT evaluate and treat, OT evaluate and treat, SLP evaluate and treat, PM&R evaluate for appropriate placement    Diagnostics ordered/pending: None     VTE prophylaxis: None: Reason for No Pharmacological VTE Prophylaxis: Currently on anticoagulation    BP parameters: Infarct: No intervention, SBP <220

## 2022-06-22 NOTE — PT/OT/SLP PROGRESS
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   Length of Stay: 3 days  Recent Surgery: * No surgery found *      Recommendations:     Discharge Recommendations: rehabilitation facility  Discharge Equipment Recommendations:  other (see comments) (TBD progress pending)  Barriers to discharge:  Other (Comment) (Increasd skilled assistance required)    Plan:     Patient to be seen 3 x/week to address the above listed problems via self-care/home management, therapeutic activities, therapeutic exercises, neuromuscular re-education, cognitive retraining, wheelchair management/training  · Plan of Care Expires: 07/20/22  · Plan of Care Reviewed with: patient    Assessment:   Selma Lux is a 86 y.o. 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  She presents with the following performance deficits affecting function: weakness, impaired endurance, impaired self care skills, impaired functional mobilty, gait instability, impaired balance, impaired sensation, decreased coordination, decreased upper extremity function, decreased lower extremity function, decreased safety awareness, decreased ROM, impaired coordination, impaired fine motor.      Pt tolerated session fairly this date and was willing to participate. Demonstrating continued left hemiparesis, impaired endurance, increased fatigue, decreased activity tolerance, increased weakness, poor trunk stability/postural control, impaired balance, and decreased safety awareness, requiring increased assistance and time to complete functional tasks. Patient maintained eye open posture for ~50% of session, requiring cueing for alert posture. Patient completed bed mobility with Max-Total A this date while HOB elevated. Patient tolerated EOB sitting with varying  "ranges of Min-CGA for ~15 minutes while engaged in functional tasks. Patient with increased posterior and right lateral leans, unable to self correct with cueing, able to detect when leans are increasing with startle response. Patient completed LUE shoulder flexion/extension and IR/ER using towel on table top with hand over hand. Patient limited in functional performance this date due to increased fatigue with prolonged EOB sitting. Patient completed sit>stand transfer with Total A requiring LLE placement and blocking to achieve full stance. Patient is progressing towards established goals, and continues to benefit from acute skilled OT services to increase functional performance and improve quality of life. OT to continue to recommend Rehab at discharge to improve pt functional independence and increase patient safety before returning home.      Rehab Prognosis: Fair; patient would benefit from acute skilled OT services to address these deficits and reach maximum level of function.        Subjective   Communicated with: Nurse prior to session.  Patient found HOB elevated with telemetry, peripheral IV, PureWick upon OT entry to room. Pt agreeable to participate at this time.    Patient: " Let me just hold on right here "    Pain/Comfort:  · Pain Rating 1: 0/10  · Pain Rating Post-Intervention 1: 0/10    Objective:   Patient found with: telemetry, peripheral IV, PureWick   General Precautions: Standard, Cardiac fall   Orthopedic Precautions:N/A   Braces: N/A   Oxygen Device: Room Air  Vitals: BP (!) 142/86 (Patient Position: Lying)   Pulse 72   Temp 98 °F (36.7 °C) (Oral)   Resp 18   Ht 5' 4" (1.626 m)   Wt 63.5 kg (139 lb 15.9 oz)   LMP  (LMP Unknown)   SpO2 96%   BMI 24.03 kg/m²     Outcome Measures:  AMPAC 6 Click ADL: 9    Cognition:   · Alert and Cooperative  · Command following: follows one-step commands  · Communication: clear/fluent    Occupational Performance:  Bed Mobility:    · Patient completed " Rolling/Turning to Left with  maximal assistance  · Patient completed Supine to Sit with maximal assistance on L side of bed  · Scooting anteriorly to EOB to have both feet planted on floor: total assistance  · Patient completed Sit to Supine with total assistance on L side of bed  · Scooting to HOB in supine: dependent and drawsheet pull via Trendelenburg position    Functional Mobility/Transfers:   Static Sitting EOB: Min-CGA, increased posterior and right lateral leans   Patient completed Sit <> Stand Transfer from EOB with total assistance  with  hand-held assist with LLE placement and blocking   Static Standing Balance: Total A   Deferred additional mobility trials due to increased fatigue      Activities of Daily Living:  · Grooming: minimum assistance and moderate assistance to perform oral care and wash face seated EOB using RUE while supported on table    AMPAC 6 Click ADL:  AMPAC Total Score: 9    Treatment & Education:  -Pt education on OT role and POC.  -Importance of E/OOB activity with staff assistance, emphasis on daily participation  -Patient completed LUE shoulder flexion/extension and IR/ER using towel on table top with hand over hand  -Safety during functional transfer and mobility ensured  -Patient provided with education on importance of Bilateral UB/LB integration during functional tasks for improvement in functional performance.   -Education provided/reviewed, questions answered within OT scope of practice.   -Patient will continue to require reinforcement to demonstrate understanding and learning this date.         Patient left HOB elevated with all lines intact and call button in reach    GOALS:   Multidisciplinary Problems     Occupational Therapy Goals        Problem: Occupational Therapy    Goal Priority Disciplines Outcome Interventions   Occupational Therapy Goal     OT, PT/OT Ongoing, Progressing    Description: Goals to be met by: 7/4/22     Patient will increase functional  independence with ADLs by performing:    Feeding with Stand-by Assistance.  UE Dressing with Minimal Assistance.  LE Dressing with Moderate Assistance.  Grooming while standing at sink with Minimal Assistance.  Toileting from bedside commode with Moderate Assistance for hygiene and clothing management.   Toilet transfer to bedside commode with Minimal Assistance.                     Time Tracking:     OT Date of Treatment: 06/22/22  OT Start Time: 0946  OT Stop Time: 1017  OT Total Time (min): 31 min    Billable Minutes:Self Care/Home Management 15  Neuromuscular Re-education 16      6/22/2022

## 2022-06-22 NOTE — PROGRESS NOTES
Pharmacist Renal Dose Adjustment Note    Selma Lux is a 86 y.o. female being treated with the medication famotidine.    Patient Data:    Vital Signs (Most Recent):  Temp: 98 °F (36.7 °C) (06/22/22 0751)  Pulse: 72 (06/22/22 0751)  Resp: 18 (06/22/22 0751)  BP: (!) 142/86 (06/22/22 0751)  SpO2: 96 % (06/22/22 0751)   Vital Signs (72h Range):  Temp:  [97 °F (36.1 °C)-98.7 °F (37.1 °C)]   Pulse:  []   Resp:  [16-20]   BP: (103-171)/()   SpO2:  [91 %-100 %]      Recent Labs   Lab 06/19/22  2045 06/20/22  0453 06/21/22  0838   CREATININE 1.3 0.8 0.8     Serum creatinine: 0.8 mg/dL 06/21/22 0838  Estimated creatinine clearance: 45.2 mL/min    Famotidine 20 mg oral BID will be changed to Famotidine 20 mg oral QD.     Pharmacist's Name: Gardenia Tuttle  Pharmacist's Extension: [phone]

## 2022-06-22 NOTE — PROGRESS NOTES
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 female with PMHx prior stroke, AFib (on eliquis), HTN, HLD and TIA who presents to [facility] [place] with left sided weakness. LKN 2300 6/18/2022. On evaluation patient is hypotensive with left sided hemiparesis. NIHSS 4. Labs CBC/CMP benign; mild Cr increase from priors. Imaging reviewed; CTA limited by contrast extravasation. CTH prelim read with hypodensity in MCA distribution, however, similar to prior CTH films. CTA without LVO. tPA not given as patient OOW and on anticoagulation. Ordered MRI brain acute ischemic protocol for further evaluation as patient is within thrombectomy window. MRI brain with small linear area of diffusion restriction along the right coronary radiata, suggestive of acute infarct. Stroke Etiology: Ischemic Small Vessel Disease (Lacunar). PT/OT recommending inpatient rehab; patient agreeable.    Antithrombotics for secondary stroke prevention: Anticoagulants: Apixaban 5 mg BID     Statins for secondary stroke prevention and hyperlipidemia, if present:   Statins: Atorvastatin- 40 mg daily    Aggressive risk factor modification: HTN, HLD, Diet, Exercise, A-Fib     Rehab efforts: The patient has been evaluated by a stroke team provider and the therapy needs have been fully considered based off the presenting complaints and exam findings. The following therapy evaluations are needed: PT evaluate and treat, OT evaluate and treat, SLP evaluate and treat, PM&R evaluate for appropriate placement    Diagnostics ordered/pending: None     VTE prophylaxis: None: Reason for No Pharmacological VTE Prophylaxis: Currently on anticoagulation    BP parameters: Infarct: No intervention, SBP <220    AF (paroxysmal atrial fibrillation)  Stroke risk factor  On eliquis  Not a candidate for tPA    Hyperlipidemia  Stroke  risk factor  LDL < 70  Continue atorvastatin 40 mg QD    Hypertension, essential  Stroke risk factor  Workup on going  No hemorrhage on CTA  Continue amlodipine    Thrombocytopenia  Possibly 2/2 famotidine  Discontinuing medication and re-evaluate CBC QD    RA (rheumatoid arthritis)  Continue home medications    PUD (peptic ulcer disease)  Continue home medications    COPD (chronic obstructive pulmonary disease)  PRN albuterol per home medication review    Age-related physical debility  PT/OT    Moderate protein-calorie malnutrition  Nutrition Consult; appreciate recommendations     Generalized weakness  PT/OT    Vitamin D insufficiency  Continue home medications    Discharge planning issues  PT/OT recommending inpatient rehab  - inpatient rehab         No notes on file    STROKE DOCUMENTATION   Acute Stroke Times   Last Known Normal Date: 06/18/22  Last Known Normal Time: 2300  Symptom Onset Date: 06/19/22  Symptom Onset Time: 0830  Stroke Team Called Date: 06/19/22  Stroke Team Called Time: 1950  Stroke Team Arrival Date: 06/19/22  Stroke Team Arrival Time: 1955  CT Interpretation Time: 2015  Alteplase Recommended: No  CTA Interpretation Time: 2020    NIH Scale:  1a. Level of Consciousness: 0-->Alert, keenly responsive  1b. LOC Questions: 0-->Answers both questions correctly  1c. LOC Commands: 0-->Performs both tasks correctly  2. Best Gaze: 0-->Normal  3. Visual: 0-->No visual loss  4. Facial Palsy: 0-->Normal symmetrical movements  5a. Motor Arm, Left: 2-->Some effort against gravity, limb cannot get to or maintain (if cued) 90 (or 45) degrees, drifts down to bed, but has some effort against gravity  5b. Motor Arm, Right: 0-->No drift, limb holds 90 (or 45) degrees for full 10 secs  6a. Motor Leg, Left: 2-->Some effort against gravity, leg falls to bed by 5 secs, but has some effort against gravity  6b. Motor Leg, Right: 0-->No drift, leg holds 30 degree position for full 5 secs  7. Limb Ataxia: 0-->Absent  8.  Sensory: 0-->Normal, no sensory loss  9. Best Language: 0-->No aphasia, normal  10. Dysarthria: 0-->Normal  11. Extinction and Inattention (formerly Neglect): 0-->No abnormality  Total (NIH Stroke Scale): 4        Modified Laurel Score: 1  Marion Coma Scale:    ABCD2 Score:    [id number] Score:8  HAS -BLED Score:   ICH Score:   Hunt & Jain Classification:      Hemorrhagic change of an Ischemic Stroke: Does this patient have an ischemic stroke with hemorrhagic changes? No     Neurologic Chief Complaint: Lacunar stroke    Subjective:     Interval History: Patient is seen for follow-up neurological assessment and treatment recommendations:     No acute or concerning events noted by overnight staff. Vital signs are stable and within normal limits. Patient is conscious and comfortable. PT/OT recommending inpatient rehab. Patient amenable. Labs notable for thrombocytopenia in low 100s; possible adverse effect of famotidine. Discontinuing.    HPI, Past Medical, Family, and Social History remains the same as documented in the initial encounter.     Review of Systems   Constitutional:  Positive for activity change and fatigue. Negative for fever.   HENT:  Negative for facial swelling and hearing loss.    Eyes:  Negative for pain, discharge and visual disturbance.   Respiratory:  Negative for cough and shortness of breath.    Cardiovascular:  Negative for chest pain and palpitations.   Gastrointestinal:  Negative for abdominal pain and nausea.   Musculoskeletal:  Positive for arthralgias. Negative for neck pain.   Skin:  Negative for color change and rash.   Neurological:  Positive for weakness. Negative for facial asymmetry, speech difficulty and numbness.   Psychiatric/Behavioral:  Negative for agitation and confusion.      Scheduled Meds:   amLODIPine  10 mg Oral Daily    apixaban  5 mg Oral BID    atorvastatin  40 mg Oral Daily    calcium carbonate  1,000 mg Oral Daily    furosemide  40 mg Oral Every other day     gabapentin  100 mg Oral BID    gabapentin  300 mg Oral QHS    hydrOXYchloroQUINE  200 mg Oral BID    mycophenolate  500 mg Oral BID    vitamin D  2,000 Units Oral Daily     Continuous Infusions:   sodium chloride 0.9%       PRN Meds:acetaminophen, acetaminophen, albuterol, baclofen, labetaloL, sodium chloride 0.9%, sodium chloride 0.9%    Objective:     Vital Signs (Most Recent):  Temp: 98.1 °F (36.7 °C) (06/22/22 1231)  Pulse: 95 (06/22/22 1231)  Resp: 18 (06/22/22 1231)  BP: 108/87 (06/22/22 1231)  SpO2: 98 % (06/22/22 1231)  BP Location: Right arm    Vital Signs Range (Last 24H):  Temp:  [97.9 °F (36.6 °C)-98.7 °F (37.1 °C)]   Pulse:  [72-99]   Resp:  [16-18]   BP: (103-142)/(55-87)   SpO2:  [95 %-98 %]   BP Location: Right arm    Physical Exam  Vitals reviewed.   Constitutional:       General: She is not in acute distress.     Appearance: Normal appearance. She is normal weight. She is not ill-appearing.   HENT:      Head: Normocephalic and atraumatic.      Mouth/Throat:      Mouth: Mucous membranes are moist.      Pharynx: Oropharynx is clear.   Eyes:      General:         Right eye: No discharge.         Left eye: No discharge.      Extraocular Movements: Extraocular movements intact.      Conjunctiva/sclera: Conjunctivae normal.      Pupils: Pupils are equal, round, and reactive to light.   Cardiovascular:      Rate and Rhythm: Normal rate.   Pulmonary:      Effort: Pulmonary effort is normal. No respiratory distress.   Neurological:      Mental Status: She is alert.      Motor: Weakness present.       Neurological Exam:   LOC: alert  Attention Span: Good   Language: No aphasia  Articulation: No dysarthria  Orientation: Person, Place, Time   Visual Fields: Full  EOM (CN III, IV, VI): Full/intact  Pupils (CN II, III): PERRL  Facial Sensation (CN V): Normal  Facial Movement (CN VII): Symmetric facial expression    Motor: Arm left  Paresis: 3-/5; extensor weakness  Leg left  Paresis: 3-/5; flexor  weakness  Arm right  Normal 5/5  Leg right Normal 5/5  Cerebellum: No evidence of appendicular or axial ataxia, cannot perform on left due to weakness  Sensation: Intact to light touch and temperature    Laboratory:  CMP:   No results for input(s): GLUCOSE, CALCIUM, ALBUMIN, PROT, NA, K, CO2, CL, BUN, CREATININE, ALKPHOS, ALT, AST, BILITOT in the last 24 hours.    CBC:   Recent Labs   Lab 06/22/22  0838   WBC 9.66   RBC 4.39   HGB 10.4*   HCT 35.0*   *   MCV 80*   MCH 23.7*   MCHC 29.7*       Lipid Panel:   Recent Labs   Lab 06/19/22  2045   CHOL 120   LDLCALC 45.0*   HDL 65   TRIG 50       Coagulation:   Recent Labs   Lab 06/20/22  0453   INR 1.1   APTT 23.4       Platelet Aggregation Study: No results for input(s): PLTAGG, PLTAGINTERP, PLTAGREGLACO, ADPPLTAGGREG in the last 168 hours.  Hgb A1C:   Recent Labs   Lab 06/20/22  0226   HGBA1C 5.3       TSH:   Recent Labs   Lab 06/19/22 2045   TSH 2.565         Diagnostic Results     Brain Imaging   MRI Brain Ischemic Inter Pro Incl MRA W/O Con 06/19/2022    Narrative  EXAMINATION:  MRI BRAIN ISCHEMIC INTERVENTIONAL PROTOCOL INCL MRA W/O CONTRAST    CLINICAL HISTORY:  CVA;    TECHNIQUE:  Multiplanar multisequence MR imaging of the brain was performed without the use of intravenous contrast.    Obtained via the same acquisition from the MRI brain, a time-of-flight MR arteriogram of the intracranial vasculature with multiple MIPS reconstructions.    Images were obtained there brain ischemic protocol.    COMPARISON:  CTA 06/19/2022    FINDINGS:  Technically limited exam due to unavailability of MRI head coil.    Brain:    Ventricles and sulci are stable in size and configuration.  Redemonstration of stable right subdural collection.    Linear area of diffusion restriction along the right corona radiata suggestive of acute infarct.  Confluent supratentorial periventricular white matter T2/FLAIR signal suggestive of chronic microvascular ischemic change.  Remote  bilateral cerebellar infarcts.  No parenchymal mass, hemorrhage, or edema.    Bone marrow signal intensity unremarkable.    MRA:    Distal internal carotid arteries are normal in caliber.  No significant stenosis at either carotid bifurcation.    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  The basilar artery appears within normal limits.    SAMMI trifurcation.  The ACAs, MCAs and PCAs demonstrate no evidence of  high-grade stenosis, focal occlusion or intracranial aneurysm.    Impression  Small linear area of diffusion restriction along the right coronary radiata, suggestive of acute infarct.  No large vessel occlusion.    Sequela of chronic microvascular ischemic changes.    Stable right subdural collection.    Remote bilateral cerebellar infarcts.    Dr. Culver discussed preliminary findings with Dr. Guzmán on 06/19/2022 at 22:00.    Electronically signed by resident: Anna Culver  Date:    06/19/2022  Time:    21:48    Electronically signed by: Flo Scott MD  Date:    06/19/2022  Time:    22:08    Vessel Imaging   CTA STROKE MULTI-PHASE 06/19/2022    Narrative  EXAMINATION:  CTA STROKE MULTI-PHASE    CLINICAL HISTORY:  Neuro deficit, acute, stroke suspected;    TECHNIQUE:  Axial CT images obtained throughout the region of the head after the administration of intravenous contrast.  CT angiogram was performed from through the cervical and intracranial vasculature during the IV bolus administration of 100mL of Omnipaque 350.  Multiplanar MPR and MIP reformats were performed.    Additional 2 delayed images of the intracranial vasculature was performed.    Rapid AI assessment were included.    COMPARISON:  CT 03/05/2022 12/13/2021    FINDINGS:  Evaluation is limited due to suboptimal contrast timing and unavailable noncontrast images.    CT head:    Ventricles size and configuration are similar to prior.  There is stable 3 mm low-density subdural collection overlying the right frontal  lobe, similar to prior.    The brain appears unchanged.  Supratentorial periventricular white matter hypoattenuation suggestive of chronic microvascular ischemic change.  Remote bilateral cerebellar infarcts.  Gray and white matter differentiation is preserved.  No parenchymal mass, hemorrhage, edema or major vascular distribution infarct.    Hyperostosis frontalis interna.  No depressed calvarial fracture.  Teeth hardware.  Paranasal sinuses and mastoid air cells are essentially clear.      CTA:    There is stable narrowing right common carotid origin.  Bilateral carotid bifurcation calcifications without significant narrowing, allowing study limitations.  There is significant cavernous and petrous segment calcifications bilaterally.  No significant stenosis.    Right vertebral artery origin is patent.  Proximal left vertebral artery is not visualized..  The cervical vertebral arteries are normal in course and caliber. Vertebrobasilar system is without focal abnormality.    The anterior, middle, and posterior cerebral arteries are within normal limits, without evidence of significant stenosis, focal occlusion, or intracranial aneurysm formation.    Head and neck soft tissue:    Enlarged thyroid with multiple nodules, dominant nodule within the right thyroid lobe extending to the superior mediastinum and measuring 6.5 cm, similar to prior.  There is a mass effect on with leftward shift of the mediastinal structures.    Leftward midline shift of the trachea due to large thyroid, similar to prior.  Ground-glass opacities and subsegmental atelectases within upper lung zones.    Advanced degenerative spine changes.  There is chronic osteomyelitis at the C6-C7 level.    Impression  Limited exam due to suboptimal contrast timing and unavailable noncontrast images.  Additional evaluation, as clinically warranted    No major vascular distribution infarct or large vessel occlusion.    Stable 3 mm subdural collection  overlying the right frontal lobe.    Bilateral distal ICAs atherosclerotic disease without significant stenosis.    Significant enlarged thyroid with multiple nodules, the dominant nodule within the right thyroid lobe is stable in size.  There is a stable significant mass effect on the and superior mediastinal structures with leftward shift.    Ground-glass opacities within the upper lung zones, could represent aspiration, infection, or noninfectious inflammatory process.    Chronic discitis-osteomyelitis at the C6-C7 level.    Additional findings as above.    Electronically signed by resident: Anna Culver  Date:    06/19/2022  Time:    20:40    Electronically signed by: Flo Scott MD  Date:    06/19/2022  Time:    21:48    Cardiac Imaging   Transthoracic echo (TTE) complete 06/20/2022    Interpretation Summary  · The left ventricle is normal in size with normal systolic function.  · The estimated ejection fraction is 55%.  · Normal left ventricular diastolic function.  · Mild-to-moderate mitral regurgitation.  · Normal right ventricular size with normal right ventricular systolic function.  · Trivial posterolateral pericardial effusion.        Cliff Monson MD  Comprehensive Stroke Center  Department of Vascular Neurology   [facility]

## 2022-06-22 NOTE — PLAN OF CARE
Pt awake,alert,verbally responsive. No distress noted,breathing unlabored. No injuries noted this shift. Bed in low position,call light in reach,side rails up x2 Will continue to monitor.

## 2022-06-22 NOTE — PLAN OF CARE
06/22/22 1143   Post-Acute Status   Post-Acute Authorization Placement   Post-Acute Placement Status Pending post-acute provider review/more information requested       [facility] following    CM spoke with Lisa at [facility] she will bring to nurse for review.     NATALIA received call from Lisa with [facility], she is reviewing and has LVM for pt's daughter.

## 2022-06-22 NOTE — PLAN OF CARE
Problem: Adjustment to Illness (Stroke, Ischemic/Transient Ischemic Attack)  Goal: Optimal Coping  Outcome: Ongoing, Progressing     Problem: Bowel Elimination Impaired (Stroke, Ischemic/Transient Ischemic Attack)  Goal: Effective Bowel Elimination  Outcome: Ongoing, Progressing     Problem: Cerebral Tissue Perfusion (Stroke, Ischemic/Transient Ischemic Attack)  Goal: Optimal Cerebral Tissue Perfusion  Outcome: Ongoing, Progressing     Problem: Cognitive Impairment (Stroke, Ischemic/Transient Ischemic Attack)  Goal: Optimal Cognitive Function  Outcome: Ongoing, Progressing     Problem: Fall Injury Risk  Goal: Absence of Fall and Fall-Related Injury  Outcome: Ongoing, Progressing     Problem: Altered Behavior (Delirium)  Goal: Improved Behavioral Control  Outcome: Ongoing, Progressing     Problem: Skin Injury Risk Increased  Goal: Skin Health and Integrity  Outcome: Ongoing, Progressing       Patient mentation has improved from previous PM shift. Denies to pain, had been sleeping all day. Patient reemphasized being called 'Dr. HA'. Still lethargic but answers commands.

## 2022-06-22 NOTE — CONSULTS
Inpatient consult to Physical Medicine Rehab  Consult performed by: Frances Vazquez NP  Consult ordered by: Cliff Monson MD  Reason for consult: Asses rehab needs      Rehab team already following. Please see previous note for further details.     MENDOZA Kruger, FNP-C  Physical Medicine & Rehabilitation   6/22/22

## 2022-06-23 VITALS
RESPIRATION RATE: 18 BRPM | BODY MASS INDEX: 23.9 KG/M2 | DIASTOLIC BLOOD PRESSURE: 58 MMHG | TEMPERATURE: 99 F | OXYGEN SATURATION: 94 % | HEART RATE: 96 BPM | SYSTOLIC BLOOD PRESSURE: 116 MMHG | HEIGHT: 64 IN | WEIGHT: 140 LBS

## 2022-06-23 LAB
ANION GAP SERPL CALC-SCNC: 8 MMOL/L (ref 8–16)
ANION GAP SERPL CALC-SCNC: 8 MMOL/L (ref 8–16)
ANISOCYTOSIS BLD QL SMEAR: SLIGHT
BASOPHILS # BLD AUTO: 0.02 K/UL (ref 0–0.2)
BASOPHILS NFR BLD: 0.2 % (ref 0–1.9)
BUN SERPL-MCNC: 28 MG/DL (ref 8–23)
BUN SERPL-MCNC: 28 MG/DL (ref 8–23)
CALCIUM SERPL-MCNC: 8.6 MG/DL (ref 8.7–10.5)
CALCIUM SERPL-MCNC: 8.7 MG/DL (ref 8.7–10.5)
CHLORIDE SERPL-SCNC: 100 MMOL/L (ref 95–110)
CHLORIDE SERPL-SCNC: 99 MMOL/L (ref 95–110)
CO2 SERPL-SCNC: 24 MMOL/L (ref 23–29)
CO2 SERPL-SCNC: 24 MMOL/L (ref 23–29)
CREAT SERPL-MCNC: 1.5 MG/DL (ref 0.5–1.4)
CREAT SERPL-MCNC: 1.7 MG/DL (ref 0.5–1.4)
DIFFERENTIAL METHOD: ABNORMAL
EOSINOPHIL # BLD AUTO: 0.2 K/UL (ref 0–0.5)
EOSINOPHIL NFR BLD: 1.8 % (ref 0–8)
ERYTHROCYTE [DISTWIDTH] IN BLOOD BY AUTOMATED COUNT: 17.7 % (ref 11.5–14.5)
EST. GFR  (AFRICAN AMERICAN): 31.5 ML/MIN/1.73 M^2
EST. GFR  (AFRICAN AMERICAN): 36.6 ML/MIN/1.73 M^2
EST. GFR  (NON AFRICAN AMERICAN): 27.3 ML/MIN/1.73 M^2
EST. GFR  (NON AFRICAN AMERICAN): 31.8 ML/MIN/1.73 M^2
GLUCOSE SERPL-MCNC: 81 MG/DL (ref 70–110)
GLUCOSE SERPL-MCNC: 96 MG/DL (ref 70–110)
HCT VFR BLD AUTO: 34.1 % (ref 37–48.5)
HGB BLD-MCNC: 10.3 G/DL (ref 12–16)
HYPOCHROMIA BLD QL SMEAR: ABNORMAL
IMM GRANULOCYTES # BLD AUTO: 0.06 K/UL (ref 0–0.04)
IMM GRANULOCYTES NFR BLD AUTO: 0.5 % (ref 0–0.5)
LYMPHOCYTES # BLD AUTO: 3.4 K/UL (ref 1–4.8)
LYMPHOCYTES NFR BLD: 29.7 % (ref 18–48)
MCH RBC QN AUTO: 24.2 PG (ref 27–31)
MCHC RBC AUTO-ENTMCNC: 30.2 G/DL (ref 32–36)
MCV RBC AUTO: 80 FL (ref 82–98)
MONOCYTES # BLD AUTO: 3.9 K/UL (ref 0.3–1)
MONOCYTES NFR BLD: 34.4 % (ref 4–15)
NEUTROPHILS # BLD AUTO: 3.8 K/UL (ref 1.8–7.7)
NEUTROPHILS NFR BLD: 33.4 % (ref 38–73)
NRBC BLD-RTO: 0 /100 WBC
OVALOCYTES BLD QL SMEAR: ABNORMAL
PLATELET # BLD AUTO: 97 K/UL (ref 150–450)
PMV BLD AUTO: 10.5 FL (ref 9.2–12.9)
POIKILOCYTOSIS BLD QL SMEAR: SLIGHT
POLYCHROMASIA BLD QL SMEAR: ABNORMAL
POTASSIUM SERPL-SCNC: 4.1 MMOL/L (ref 3.5–5.1)
POTASSIUM SERPL-SCNC: 4.1 MMOL/L (ref 3.5–5.1)
RBC # BLD AUTO: 4.26 M/UL (ref 4–5.4)
SCHISTOCYTES BLD QL SMEAR: ABNORMAL
SODIUM SERPL-SCNC: 131 MMOL/L (ref 136–145)
SODIUM SERPL-SCNC: 132 MMOL/L (ref 136–145)
WBC # BLD AUTO: 11.3 K/UL (ref 3.9–12.7)

## 2022-06-23 PROCEDURE — 80048 BASIC METABOLIC PNL TOTAL CA: CPT | Performed by: STUDENT IN AN ORGANIZED HEALTH CARE EDUCATION/TRAINING PROGRAM

## 2022-06-23 PROCEDURE — 36415 COLL VENOUS BLD VENIPUNCTURE: CPT | Performed by: STUDENT IN AN ORGANIZED HEALTH CARE EDUCATION/TRAINING PROGRAM

## 2022-06-23 PROCEDURE — 85025 COMPLETE CBC W/AUTO DIFF WBC: CPT | Performed by: STUDENT IN AN ORGANIZED HEALTH CARE EDUCATION/TRAINING PROGRAM

## 2022-06-23 PROCEDURE — 25000003 PHARM REV CODE 250: Performed by: STUDENT IN AN ORGANIZED HEALTH CARE EDUCATION/TRAINING PROGRAM

## 2022-06-23 PROCEDURE — 99238 PR HOSPITAL DISCHARGE DAY,<30 MIN: ICD-10-PCS | Mod: ,,, | Performed by: PSYCHIATRY & NEUROLOGY

## 2022-06-23 PROCEDURE — 51798 US URINE CAPACITY MEASURE: CPT

## 2022-06-23 PROCEDURE — 63600175 PHARM REV CODE 636 W HCPCS: Performed by: STUDENT IN AN ORGANIZED HEALTH CARE EDUCATION/TRAINING PROGRAM

## 2022-06-23 PROCEDURE — 99238 HOSP IP/OBS DSCHRG MGMT 30/<: CPT | Mod: ,,, | Performed by: PSYCHIATRY & NEUROLOGY

## 2022-06-23 PROCEDURE — 25000003 PHARM REV CODE 250: Performed by: NURSE PRACTITIONER

## 2022-06-23 PROCEDURE — 97530 THERAPEUTIC ACTIVITIES: CPT | Mod: CQ

## 2022-06-23 PROCEDURE — 92507 TX SP LANG VOICE COMM INDIV: CPT

## 2022-06-23 RX ORDER — ONDANSETRON 2 MG/ML
4 INJECTION INTRAMUSCULAR; INTRAVENOUS ONCE
Status: COMPLETED | OUTPATIENT
Start: 2022-06-23 | End: 2022-06-23

## 2022-06-23 RX ORDER — POLYETHYLENE GLYCOL 3350 17 G/17G
17 POWDER, FOR SOLUTION ORAL 2 TIMES DAILY
Status: DISCONTINUED | OUTPATIENT
Start: 2022-06-23 | End: 2022-06-23 | Stop reason: HOSPADM

## 2022-06-23 RX ORDER — FERROUS SULFATE 325(65) MG
325 TABLET, DELAYED RELEASE (ENTERIC COATED) ORAL DAILY
Refills: 0
Start: 2022-06-23 | End: 2022-12-06

## 2022-06-23 RX ORDER — AMOXICILLIN 250 MG
2 CAPSULE ORAL 2 TIMES DAILY
Status: DISCONTINUED | OUTPATIENT
Start: 2022-06-23 | End: 2022-06-23 | Stop reason: HOSPADM

## 2022-06-23 RX ORDER — LANOLIN ALCOHOL/MO/W.PET/CERES
1 CREAM (GRAM) TOPICAL DAILY
Status: DISCONTINUED | OUTPATIENT
Start: 2022-06-23 | End: 2022-06-23 | Stop reason: HOSPADM

## 2022-06-23 RX ORDER — AMOXICILLIN 250 MG
2 CAPSULE ORAL 2 TIMES DAILY
Start: 2022-06-23 | End: 2022-10-12

## 2022-06-23 RX ADMIN — AMLODIPINE BESYLATE 10 MG: 10 TABLET ORAL at 08:06

## 2022-06-23 RX ADMIN — MYCOPHENOLATE MOFETIL 500 MG: 250 CAPSULE ORAL at 09:06

## 2022-06-23 RX ADMIN — ONDANSETRON HYDROCHLORIDE 4 MG: 2 INJECTION INTRAMUSCULAR; INTRAVENOUS at 03:06

## 2022-06-23 RX ADMIN — GABAPENTIN 100 MG: 100 CAPSULE ORAL at 09:06

## 2022-06-23 RX ADMIN — ATORVASTATIN CALCIUM 40 MG: 40 TABLET, FILM COATED ORAL at 08:06

## 2022-06-23 RX ADMIN — SENNOSIDES AND DOCUSATE SODIUM 2 TABLET: 50; 8.6 TABLET ORAL at 08:06

## 2022-06-23 RX ADMIN — ACETAMINOPHEN 500 MG: 500 TABLET ORAL at 06:06

## 2022-06-23 RX ADMIN — CALCIUM CARBONATE (ANTACID) CHEW TAB 500 MG 1000 MG: 500 CHEW TAB at 09:06

## 2022-06-23 RX ADMIN — APIXABAN 5 MG: 5 TABLET, FILM COATED ORAL at 08:06

## 2022-06-23 RX ADMIN — HYDROXYCHLOROQUINE SULFATE 200 MG: 200 TABLET, FILM COATED ORAL at 08:06

## 2022-06-23 RX ADMIN — FUROSEMIDE 40 MG: 40 TABLET ORAL at 08:06

## 2022-06-23 RX ADMIN — Medication 2000 UNITS: at 08:06

## 2022-06-23 RX ADMIN — POLYETHYLENE GLYCOL 3350 17 G: 17 POWDER, FOR SOLUTION ORAL at 08:06

## 2022-06-23 RX ADMIN — FERROUS SULFATE TAB 325 MG (65 MG ELEMENTAL FE) 1 EACH: 325 (65 FE) TAB at 08:06

## 2022-06-23 RX ADMIN — SODIUM CHLORIDE 1000 ML: 0.9 INJECTION, SOLUTION INTRAVENOUS at 01:06

## 2022-06-23 NOTE — PT/OT/SLP PROGRESS
"Physical Therapy Treatment    Patient Name:  Selma Lux   MRN:  [id number]    Recommendations:     Discharge Recommendations:  rehabilitation facility   Discharge Equipment Recommendations: other (see comments) (TBD)   Barriers to discharge: Inaccessible home, impaired functional mobility, and high risk for falls    Assessment:     Selma Lux is a 86 y.o. 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  She presents with the following impairments/functional limitations:  weakness, impaired endurance, impaired self care skills, impaired functional mobilty, gait instability, impaired balance, impaired coordination, impaired fine motor, decreased safety awareness, decreased lower extremity function. Pt required significant level of assistance with bed mobility and balance at EOB; pt remains a high risk for falls. Pt willing to participate in PT,session limited d/t fatigue. Pt will benefit from continuing PT to address limitations noted above.    Rehab Prognosis: Good; patient would benefit from acute skilled PT services to address these deficits and reach maximum level of function.    Recent Surgery: * No surgery found *      Plan:     During this hospitalization, patient to be seen 4 x/week to address the identified rehab impairments via gait training, therapeutic activities, therapeutic exercises, neuromuscular re-education and progress toward the following goals:    · Plan of Care Expires:  07/20/22    Subjective     Chief Complaint: pain, fatigue  Patient/Family Comments/goals: "this is the worst day I've had here so far." "I'm not feeling good."  Pain/Comfort:  · Pain Rating 1: rating not reported  · Location 1: neck  · Pain Rating Post-Intervention 1: rating not reported      Objective:     Communicated with RN prior to session.  Patient found HOB elevated with telemetry, peripheral IV, PureWick upon SPTA entry to room. "     General Precautions: Standard, fall   Orthopedic Precautions:N/A   Braces: N/A  Respiratory Status: Room air     Functional Mobility:  · Bed Mobility:     · Rolling Right:  total assistance  · Scooting: total assistance  · Supine to Sit: total assistance  · Sit to Supine: total assistance  · Balance:   · Static Sitting Balance- Poor, pt demonstrates posterior sway       AM-PAC 6 CLICK MOBILITY  Turning over in bed (including adjusting bedclothes, sheets and blankets)?: 2  Sitting down on and standing up from a chair with arms (e.g., wheelchair, bedside commode, etc.): 2  Moving from lying on back to sitting on the side of the bed?: 2  Moving to and from a bed to a chair (including a wheelchair)?: 1  Need to walk in hospital room?: 1  Climbing 3-5 steps with a railing?: 1  Basic Mobility Total Score: 9       Therapeutic Activities and Exercises:  Static Sitting Balance at EOB ~10 mins, max A  Verbal/ manual cueing for pt to reach for target with RUE, encouraging trunk flexion.  Verbal/ manual cueing for pt to correct downward gaze and increase upright posture.   Pt assisted with functional mobility as noted.     Patient left HOB elevated with all lines intact, call button in reach, bed alarm on and RN notified.    GOALS:   Multidisciplinary Problems     Physical Therapy Goals        Problem: Physical Therapy    Goal Priority Disciplines Outcome Goal Variances Interventions   Physical Therapy Goal     PT, PT/OT Ongoing, Progressing     Description: Goals to be met by: 22    Patient will increase functional independence with mobility by performin. Supine to sit with supervision  2. Sit to supine with supervision  3. Rolling to Left and Right with supervision  4. Sit to stand transfer with minimum assistance  5. Bed to chair transfer with minimum assistance using LRAD as needed  6. Gait  x 10 feet with minimum assistance using LRAD as needed  7. Wheelchair propulsion x25 feet with contact guard assistance  using bilateral uppper extremities  8. Ascend/descend 5 stair with bilateral handrails moderate assistance using LRAD as needed  9. Lower extremity exercise program x10 reps per handout, with supervision                     Time Tracking:     PT Received On: 06/23/22  PT Start Time: 1051     PT Stop Time: 1109  PT Total Time (min): 18 min     Billable Minutes: Therapeutic Activity 18    Treatment Type: Treatment  PT/PTA: PTA     PTA Visit Number: 2     06/23/2022

## 2022-06-23 NOTE — HOSPITAL COURSE
86 y.o. female with PMHx prior stroke, AFib (on eliquis), HTN, HLD and TIA who presents to [facility] ED with left sided weakness. LKN 2300 6/18/2022. On ED evaluation patient is hypotensive with left sided hemiparesis. NIHSS 4. Labs CBC/CMP benign; mild Cr increase from priors. CTA limited by contrast extravasation. CTH prelim read with hypodensity in MCA distribution, however, similar to prior CTH films. CTA without LVO. tPA not given as patient OOW and on anticoagulation. Ordered MRI brain acute ischemic protocol for further evaluation as patient is within thrombectomy window. MRI brain with small linear area of diffusion restriction along the right coronary radiata, suggestive of acute infarct. Not a candidate for thrombectomy. Stroke Etiology: Ischemic Small Vessel Disease (Lacunar). PT/OT recommending inpatient rehab. Discharge complicated by KERMIT in setting of low PO fluid intake and urinary retention secondary to constipation. Patient also with mildly decreasing platelet counts throughout stay. BMP prior to discharge with improvement in Cr (1.7->1.5) after starting fluid bolus, straight cath for urine, and PRN constipation medications for bowel movement. Discussed with patient and family at bedside regarding repeat labs in morning to reassess Cr and Na at [facility] vs staying overnight; since patient was relieved following urination/bowel movement and Cr improved on BMP, she would like to start rehab and have repeat AM labs drawn at [facility] rehab.     NOTE: Prior to discharge, patients Cr 1.5. Due to age and Cr, patient placed on reduced dose Apixaban 2.5 mg BID. If repeat CBC/CMP displays improvement in Cr <1.5 at [facility], patient can resume full dose apixaban 5 mg BID.

## 2022-06-23 NOTE — PLAN OF CARE
[facility] (Hospital)  Discharge Final Note    Primary Care Provider: Bhargav Hirsch MD  Expected Discharge Date: 6/26/2022    Patient medically ready for discharge to [facility] Rehab room R421 under Dr. Brown.  Nurse can call report to [phone].  Transportation arranged per EMS stretcher for 1600 which is not a guaranteed arrival time.   Is family/patient aware of discharge yes - patient.  Hospital follow up scheduled, per rehab.  Vascular Neurology to schedule follow up if necessary.  Final Discharge Note (most recent)     Final Note - 06/23/22 1439        Final Note    Assessment Type Final Discharge Note     Anticipated Discharge Disposition Rehab Facility     Hospital Resources/Appts/Education Provided --   per rehab       Post-Acute Status    Post-Acute Authorization Placement     Post-Acute Placement Status Set-up Complete/Auth obtained     Discharge Delays None known at this time                 Important Message from Medicare         Referral Info (most recent)     Referral Info    No documentation.               Contact Info     Bhargav Hirsch MD   Specialty: Family Medicine, Sports Medicine   Relationship: PCP - General    [address]  [facility] [address]   Phone: [phone]       Next Steps: Follow up    Instructions: Please call to schedule your hospital follow-up appointment for within 1 week of discharge from inpatient rehab facility.        Future Appointments   Date Time Provider Department Center   7/19/2022  2:00 PM Baldomero Francis MD [facility] PULMSVC [facility]   8/1/2022 10:30 AM [facility] MAMMO1 [facility] M[facility]MO [facility]   8/19/2022 10:30 AM Rashad Monroy MD [facility] ENDO8 [facility]   8/23/2022 10:30 AM Satya Mckeon OD [facility] OPTOMTY [place]   8/23/2022 11:00 AM Satya Mckeon OD [facility] OPTOMTY [place], MARILYN  Case Management  Ext: [phone]  06/23/2022

## 2022-06-23 NOTE — PLAN OF CARE
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 [I63.311]  Unknown     Priority: 1 - High    AF (paroxysmal atrial fibrillation) [I48.0]  Yes     Priority: 2     Hyperlipidemia [E78.5]  Yes     Priority: 5     Hypertension, essential [I10]  Yes     Priority: 5     Thrombocytopenia [D69.6]  Unknown     Priority: 10     RA (rheumatoid arthritis) [M06.9]  Yes     Priority: 13     PUD (peptic ulcer disease) [K27.9]  Yes     Priority: 14     COPD (chronic obstructive pulmonary disease) [J44.9]  Unknown     Priority: 15     Moderate protein-calorie malnutrition [E44.0]  Yes     Priority: 15     Age-related physical debility [R54]  Yes     Priority: 15     Generalized weakness [R53.1]  Yes     Priority: 15     Vitamin D insufficiency [E55.9]  Yes     Priority: 20     Discharge planning issues [Z02.9]  Not Applicable     Priority: 25 - Low      Resolved Hospital Problems   No resolved problems to display.       Allergies:Review of patient's allergies indicates:  No Known Allergies    Diet: regular diet    Activities: Activity as tolerated    Goals of Care Treatment Preferences:  Code Status: Full Code    Living Will: Yes            Nursing: N/A     Labs: CBC and CMP 06/24/2022 for evaluation of Na and Cr. Given fluid bolus prior to discharge with Cr improvement but recommend repeat labs to re-assess.    CONSULTS:    Physical Therapy to evaluate and treat. , Occupational Therapy to evaluate and treat. and  to evaluate for community resources/long-range  planning.    MISCELLANEOUS CARE:  Routine Skin for Bedridden Patients: Apply moisture barrier cream to all skin folds and wet areas in perineal area daily and after baths and all bowel movements.    WOUND CARE ORDERS  None    Medications: Review discharge medications with patient and family and provide education.      Current Discharge Medication List      START taking these medications    Details   ferrous sulfate 325 (65 FE) MG EC tablet Take 1 tablet (325 mg total) by mouth once daily.  Refills: 0      senna-docusate 8.6-50 mg (PERICOLACE) 8.6-50 mg per tablet Take 2 tablets by mouth 2 (two) times daily.         CONTINUE these medications which have CHANGED    Details   apixaban (ELIQUIS) 2.5 mg Tab Take 1 tablet (2.5 mg total) by mouth 2 (two) times daily.  Qty: 60 tablet, Refills: 11    NOTE: Prior to discharge, patients Cr 1.5. Due to age and Cr, patient placed on reduced dose Apixaban 2.5 mg BID. If repeat CBC/CMP displays improvement in Cr <1.5, patient can resume full dose apixaban 5 mg BID.          CONTINUE these medications which have NOT CHANGED    Details   acetaminophen (TYLENOL) 500 MG tablet Take 1 tablet (500 mg total) by mouth every 4 (four) hours as needed for Pain.  Qty: 60 tablet, Refills: 0      amLODIPine (NORVASC) 10 MG tablet TAKE 1 TABLET(10 MG) BY MOUTH EVERY DAY  Qty: 90 tablet, Refills: 3      atorvastatin (LIPITOR) 40 MG tablet Take 40 mg by mouth once daily.      baclofen (LIORESAL) 10 MG tablet Take 0.5-1 tablets (5-10 mg total) by mouth 3 (three) times daily as needed.  Qty: 90 tablet, Refills: 2    Associated Diagnoses: Spinal stenosis, unspecified spinal region; Spondylosis of lumbar region without myelopathy or radiculopathy; Spondylosis of cervical region without myelopathy or radiculopathy      bisacodyL (DULCOLAX) 10 mg Supp Place 1 suppository (10 mg total) rectally daily as needed (constipation).  Qty: 30 suppository, Refills: 0      furosemide (LASIX) 40 MG tablet Take 1  tablet (40 mg total) by mouth every other day. TAKE 1 TABLET(40 MG) BY MOUTH DAILY  Qty: 90 tablet, Refills: 3      !! gabapentin (NEURONTIN) 100 MG capsule Take 1 capsule (100 mg total) by mouth 2 (two) times daily. Take one 100mg capsule twice a day and one 300mg capsule at bedtime  Qty: 180 capsule, Refills: 2    Associated Diagnoses: Spinal stenosis, unspecified spinal region; Spondylosis of lumbar region without myelopathy or radiculopathy; Spondylosis of cervical region without myelopathy or radiculopathy      !! gabapentin (NEURONTIN) 300 MG capsule Take 1 capsule (300 mg total) by mouth every evening.  Qty: 90 capsule, Refills: 2    Associated Diagnoses: Spinal stenosis, unspecified spinal region; Spondylosis of lumbar region without myelopathy or radiculopathy; Spondylosis of cervical region without myelopathy or radiculopathy      gabapentin 5% baclofen 2% amitriptyline 2% topical cream Apply topically 3 (three) times daily.  Qty: 240 g, Refills: 2      hydrOXYchloroQUINE (PLAQUENIL) 200 mg tablet Take 1 tablet (200 mg total) by mouth 2 (two) times daily.  Qty: 180 tablet, Refills: 11    Comments: Resume 02/12/22- 20d following covid infection  Associated Diagnoses: Seropositive rheumatoid arthritis of multiple sites      LIDOcaine (LIDODERM) 5 % Place 1 patch onto the skin once daily. Remove & Discard patch within 12 hours or as directed by MD  Qty: 30 patch, Refills: 2      magnesium oxide (MAG-OX) 400 mg (241.3 mg magnesium) tablet Take 400 mg by mouth once daily.      melatonin 12 mg Tab Take 9 mg by mouth nightly.  Qty: 30 tablet, Refills: 2      montelukast (SINGULAIR) 10 mg tablet Take 10 mg by mouth once daily.      mycophenolate (CELLCEPT) 500 mg Tab TAKE 1 TABLET TWICE A DAY  Qty: 180 tablet, Refills: 3    Comments: YOUR PATIENT HAS REQUESTED A REFILL OF THIS MEDICATION, PREVIOUSLY AUTHORIZED BY ANOTHER PRESCRIBER.  Associated Diagnoses: Cryptogenic organizing pneumonia      omeprazole (PRILOSEC)  20 MG capsule TAKE 1 CAPSULE(20 MG) BY MOUTH EVERY DAY  Qty: 90 capsule, Refills: 3      polyethylene glycol (GLYCOLAX) 17 gram PwPk Take 17 g by mouth daily as needed.  Refills: 0      predniSONE (DELTASONE) 10 MG tablet Take 1 tablet (10 mg total) by mouth once daily.  Qty: 30 tablet, Refills: 2      simethicone (MYLICON) 80 MG chewable tablet Take 1 tablet (80 mg total) by mouth 3 (three) times daily as needed for Flatulence.  Qty: 30 tablet, Refills: 0      thiamine 100 MG tablet Take 100 mg by mouth once daily.      traMADoL (ULTRAM) 50 mg tablet Take 1 tablet (50 mg total) by mouth every 6 (six) hours as needed for Pain.  Qty: 90 tablet, Refills: 0    Comments: Quantity prescribed more than 7 day supply? Yes, quantity medically necessary  Associated Diagnoses: Rheumatoid arthritis involving multiple joints      vitamin D (VITAMIN D3) 1000 units Tab Take 2 tablets (2,000 Units total) by mouth once daily.  Qty: 30 tablet, Refills: 2       !! - Potential duplicate medications found. Please discuss with provider.      STOP taking these medications       albuterol (PROVENTIL/VENTOLIN HFA) 90 mcg/actuation inhaler Comments:   Reason for Stopping:         albuterol-ipratropium (DUO-NEB) 2.5 mg-0.5 mg/3 mL nebulizer solution Comments:   Reason for Stopping:         calcium carbonate (TUMS) 200 mg calcium (500 mg) chewable tablet Comments:   Reason for Stopping:         famotidine (PEPCID) 20 MG tablet Comments:   Reason for Stopping:         lacosamide (VIMPAT) 10 mg/mL Soln oral solution Comments:   Reason for Stopping:                  Immunizations Administered as of 6/23/2022     Name Date Dose VIS Date Route Exp Date    COVID-19, MRNA, LN-S, PF (Pfizer) (Purple Cap) 9/10/2021 10:14 AM 0.3 mL 12/12/2020 Intramuscular 10/31/2021    Site: Left deltoid     Given By: Cely Valencia RN     : [employer]     Lot: [id number]     COVID-19, MRNA, LN-S, PF (Pfizer) (Purple Cap) 1/30/2021  2:24 PM 0.3 mL  12/12/2020 Intramuscular 5/31/2021    Site: Right deltoid     Given By: Jonah Pierre MA     : [employer]     Lot: [id number]     COVID-19, MRNA, LN-S, PF (Pfizer) (Purple Cap) 1/9/2021  1:40 PM 0.3 mL 12/12/2020 Intramuscular 4/30/2021    Site: Left arm     Given By: Isaac Rubio RN     : [employer]     Lot: [id number]           Some patients may experience side effects after vaccination.  These may include fever, headache, muscle or joint aches.  Most symptoms resolve with 24-48 hours and do not require urgent medical evaluation unless they persist for more than 72 hours or symptoms are concerning for an unrelated medical condition.          _________________________________  Cliff Monson MD  06/23/2022

## 2022-06-23 NOTE — PROGRESS NOTES
Report was called to Johnna at  [facility]. Telemetry monitor and IV was dc'd. Discharge instructions were given to daughter with understanding verbalized. Pt awake,alert,verbally responsive. No distress noted,breathing unlabored. Denies any pain or discomfort at this time. Will continue to mointor.

## 2022-06-23 NOTE — ASSESSMENT & PLAN NOTE
Possibly 2/2 famotidine  Discontinuing medication and re-evaluate CBC QD; plts still low. If continuing to drop, will consult hematology oncology for evaluation

## 2022-06-23 NOTE — PT/OT/SLP PROGRESS
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    Recommendations:                 General Recommendations:  Speech/language therapy and Cognitive-linguistic therapy  Diet recommendations:  Regular, Liquid Diet Level: Thin   Aspiration Precautions: 1 bite/sip at a time, Assistance with meals, Feed only when awake/alert, Frequent oral care, HOB to 90 degrees and Meds whole 1 at a time   General Precautions: Standard, fall  Communication strategies:  none    Subjective     SLP reviewed Pt with RN prior to session, RN cleared for tx  Pt presents calm  She explains, "I don't have an interest in reading anymore"    Pain/Comfort:  · Pain Rating 1: 0/10    Respiratory Status: Room air    Objective:     Has the patient been evaluated by SLP for swallowing?   Yes  Keep patient NPO? No   Current Respiratory Status:  room air    Pt found asleep in bed with caregiver at bedside. She woke per simple verbal cues.  She answered general reasoning/problem solving questions with 50% accuracy and required cues to expand/clarify from SLP and caregiver at bedside. She completed LTM questions with 80% accuracy I'ly. She declined SLP attempts to continue assessment of reading/visiospatial tasks. SLP educated Pt and caregiver on SLP role, safety/fall/ call light precautions. No questions noted. Pt remained in bed with call light in reach upon SLP exit.     Assessment:     Selma Lux is a 86 y.o. female with an SLP diagnosis of cognitive-linguistic impairments..  She presents with speech, language and cognition near baseline.     Goals:   Multidisciplinary Problems     SLP Goals        Problem: SLP    Goal Priority Disciplines Outcome   SLP Goal     SLP Ongoing, Progressing   Description: Speech-Language Pathology Goals  Goals to be met by 6/27/22  1. Patient will participate in ongoing swallow " assessment in order to determine safest least restrictive diet.   2. Patient will participate in a speech/language/cog eval. -ongoing                     Plan:     · Patient to be seen:  3 x/week   · Plan of Care expires:  07/20/22  · Plan of Care reviewed with:  patient, caregiver   · SLP Follow-Up:  Yes       Discharge recommendations:  rehabilitation facility (per PT/OT recs)       Time Tracking:     SLP Treatment Date:   06/23/22  Speech Start Time:  1250  Speech Stop Time:  1301     Speech Total Time (min):  11 min    Billable Minutes: Speech Therapy Individual 11    06/23/2022

## 2022-06-23 NOTE — DISCHARGE SUMMARY
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     LKN 2300 6/18/2022. Patient notes she woke up multiple times throughout the night (5-6x) but notes no symptoms (0100 06/19/2022). She woke up today at 0830 feeling "off." Reports her joints were more painful and her left sided weakness was greater than normal. Her daughter returned from a trip and reports the patient had difficulty ambulating, transferring from her wheel chair to the toilet, and placing her shoes on which are abnormal for her. When taking the patient to the car, she states "she was placing all of her weight on the right and unable to use the left." The prior day Ms Lux was ambulating with a cane without difficulty per text messages between daughters. She is currently on eliquis; last dose today. Patient had residual left sided weakness following her subdural but has progressed with PT/OT over the past 6 months. 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       Hospital Course (synopsis of major diagnoses, care, treatment, and services provided during the course of the hospital stay): 86 y.o. female with PMHx prior stroke, AFib (on eliquis), HTN, HLD and TIA who presents to [facility] ED with left sided weakness. LKN 2300 6/18/2022. On ED evaluation patient is hypotensive with left sided hemiparesis. NIHSS 4. Labs CBC/CMP benign; mild Cr increase from priors. CTA limited by contrast extravasation. CTH prelim read with hypodensity in MCA distribution, " however, similar to prior CTH films. CTA without LVO. tPA not given as patient OOW and on anticoagulation. Ordered MRI brain acute ischemic protocol for further evaluation as patient is within thrombectomy window. MRI brain with small linear area of diffusion restriction along the right coronary radiata, suggestive of acute infarct. Not a candidate for thrombectomy. Stroke Etiology: Ischemic Small Vessel Disease (Lacunar). PT/OT recommending inpatient rehab. Discharge complicated by KERMIT in setting of low PO fluid intake and urinary retention secondary to constipation. Patient also with mildly decreasing platelet counts throughout stay. BMP prior to discharge with improvement in Cr (1.7->1.5) after starting fluid bolus, straight cath for urine, and PRN constipation medications for bowel movement. Discussed with patient and family at bedside regarding repeat labs in morning to reassess Cr and Na at [facility] vs staying overnight; since patient was relieved following urination/bowel movement and Cr improved on BMP, she would like to start rehab and have repeat AM labs drawn at [facility] rehab.     NOTE: Prior to discharge, patients Cr 1.5. Due to age and Cr, patient placed on reduced dose Apixaban 2.5 mg BID. If repeat CBC/CMP displays improvement in Cr <1.5 at [facility], patient can resume full dose apixaban 5 mg BID.       Goals of Care Treatment Preferences:  Code Status: Full Code    Living Will: Yes              Stroke Etiology: Ischemic Small Vessel Disease (Lacunar)    STROKE DOCUMENTATION   Acute Stroke Times   Last Known Normal Date: 06/18/22  Last Known Normal Time: 2300  Symptom Onset Date: 06/19/22  Symptom Onset Time: 0830  Stroke Team Called Date: 06/19/22  Stroke Team Called Time: 1950  Stroke Team Arrival Date: 06/19/22  Stroke Team Arrival Time: 1955  CT Interpretation Time: 2015  Alteplase Recommended: No  CTA Interpretation Time: 2020     NIH Scale:  1a. Level of Consciousness: 0-->Alert, keenly responsive  1b.  LOC Questions: 0-->Answers both questions correctly  1c. LOC Commands: 0-->Performs both tasks correctly  2. Best Gaze: 0-->Normal  3. Visual: 0-->No visual loss  4. Facial Palsy: 0-->Normal symmetrical movements  5a. Motor Arm, Left: 2-->Some effort against gravity, limb cannot get to or maintain (if cued) 90 (or 45) degrees, drifts down to bed, but has some effort against gravity  5b. Motor Arm, Right: 0-->No drift, limb holds 90 (or 45) degrees for full 10 secs  6a. Motor Leg, Left: 2-->Some effort against gravity, leg falls to bed by 5 secs, but has some effort against gravity  6b. Motor Leg, Right: 0-->No drift, leg holds 30 degree position for full 5 secs  7. Limb Ataxia: 0-->Absent  8. Sensory: 0-->Normal, no sensory loss  9. Best Language: 0-->No aphasia, normal  10. Dysarthria: 0-->Normal  11. Extinction and Inattention (formerly Neglect): 0-->No abnormality  Total (NIH Stroke Scale): 4        Modified Beaver[place] Score: 1  Marion Coma Scale:    ABCD2 Score:    [id number] Score:8  HAS -BLED Score:   ICH Score:   Hunt & Jain Classification:       Assessment/Plan:     Diagnostic Results:    Brain Imaging   MRI Brain Ischemic Inter Pro Incl MRA W/O Con 06/19/2022     Narrative  EXAMINATION:  MRI BRAIN ISCHEMIC INTERVENTIONAL PROTOCOL INCL MRA W/O CONTRAST     CLINICAL HISTORY:  CVA;     TECHNIQUE:  Multiplanar multisequence MR imaging of the brain was performed without the use of intravenous contrast.     Obtained via the same acquisition from the MRI brain, a time-of-flight MR arteriogram of the intracranial vasculature with multiple MIPS reconstructions.     Images were obtained there brain ischemic protocol.     COMPARISON:  CTA 06/19/2022     FINDINGS:  Technically limited exam due to unavailability of MRI head coil.     Brain:     Ventricles and sulci are stable in size and configuration.  Redemonstration of stable right subdural collection.     Linear area of diffusion restriction along the right corona  radiata suggestive of acute infarct.  Confluent supratentorial periventricular white matter T2/FLAIR signal suggestive of chronic microvascular ischemic change.  Remote bilateral cerebellar infarcts.  No parenchymal mass, hemorrhage, or edema.     Bone marrow signal intensity unremarkable.     MRA:     Distal internal carotid arteries are normal in caliber.  No significant stenosis at either carotid bifurcation.     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  The basilar artery appears within normal limits.     SAMMI trifurcation.  The ACAs, MCAs and PCAs demonstrate no evidence of  high-grade stenosis, focal occlusion or intracranial aneurysm.     Impression  Small linear area of diffusion restriction along the right coronary radiata, suggestive of acute infarct.  No large vessel occlusion.     Sequela of chronic microvascular ischemic changes.     Stable right subdural collection.     Remote bilateral cerebellar infarcts.     Dr. Culver discussed preliminary findings with Dr. Guzmán on 06/19/2022 at 22:00.     Electronically signed by resident: Anna Culver  Date:                                                06/19/2022  Time:                                               21:48     Electronically signed by:     Flo Scott MD  Date:                                                06/19/2022  Time:                                               22:08     Vessel Imaging   CTA STROKE MULTI-PHASE 06/19/2022     Narrative  EXAMINATION:  CTA STROKE MULTI-PHASE     CLINICAL HISTORY:  Neuro deficit, acute, stroke suspected;     TECHNIQUE:  Axial CT images obtained throughout the region of the head after the administration of intravenous contrast.  CT angiogram was performed from through the cervical and intracranial vasculature during the IV bolus administration of 100mL of Omnipaque 350.  Multiplanar MPR and MIP reformats were performed.     Additional 2 delayed images of the intracranial  vasculature was performed.     Rapid AI assessment were included.     COMPARISON:  CT 03/05/2022 12/13/2021     FINDINGS:  Evaluation is limited due to suboptimal contrast timing and unavailable noncontrast images.     CT head:     Ventricles size and configuration are similar to prior.  There is stable 3 mm low-density subdural collection overlying the right frontal lobe, similar to prior.     The brain appears unchanged.  Supratentorial periventricular white matter hypoattenuation suggestive of chronic microvascular ischemic change.  Remote bilateral cerebellar infarcts.  Gray and white matter differentiation is preserved.  No parenchymal mass, hemorrhage, edema or major vascular distribution infarct.     Hyperostosis frontalis interna.  No depressed calvarial fracture.  Teeth hardware.  Paranasal sinuses and mastoid air cells are essentially clear.        CTA:     There is stable narrowing right common carotid origin.  Bilateral carotid bifurcation calcifications without significant narrowing, allowing study limitations.  There is significant cavernous and petrous segment calcifications bilaterally.  No significant stenosis.     Right vertebral artery origin is patent.  Proximal left vertebral artery is not visualized..  The cervical vertebral arteries are normal in course and caliber. Vertebrobasilar system is without focal abnormality.     The anterior, middle, and posterior cerebral arteries are within normal limits, without evidence of significant stenosis, focal occlusion, or intracranial aneurysm formation.     Head and neck soft tissue:     Enlarged thyroid with multiple nodules, dominant nodule within the right thyroid lobe extending to the superior mediastinum and measuring 6.5 cm, similar to prior.  There is a mass effect on with leftward shift of the mediastinal structures.     Leftward midline shift of the trachea due to large thyroid, similar to prior.  Ground-glass opacities and subsegmental  atelectases within upper lung zones.     Advanced degenerative spine changes.  There is chronic osteomyelitis at the C6-C7 level.     Impression  Limited exam due to suboptimal contrast timing and unavailable noncontrast images.  Additional evaluation, as clinically warranted     No major vascular distribution infarct or large vessel occlusion.     Stable 3 mm subdural collection overlying the right frontal lobe.     Bilateral distal ICAs atherosclerotic disease without significant stenosis.     Significant enlarged thyroid with multiple nodules, the dominant nodule within the right thyroid lobe is stable in size.  There is a stable significant mass effect on the and superior mediastinal structures with leftward shift.     Ground-glass opacities within the upper lung zones, could represent aspiration, infection, or noninfectious inflammatory process.     Chronic discitis-osteomyelitis at the C6-C7 level.     Additional findings as above.     Electronically signed by resident: Anna Culver  Date:                                                06/19/2022  Time:                                               20:40     Electronically signed by:     Flo Scott MD  Date:                                                06/19/2022  Time:                                               21:48     Cardiac Imaging   Transthoracic echo (TTE) complete 06/20/2022     Interpretation Summary  · The left ventricle is normal in size with normal systolic function.  · The estimated ejection fraction is 55%.  · Normal left ventricular diastolic function.  · Mild-to-moderate mitral regurgitation.  · Normal right ventricular size with normal right ventricular systolic function.  · Trivial posterolateral pericardial effusion.      Interventions: None    Complications: None    Disposition: Home or Self Care    Final Active Diagnoses:    Diagnosis Date Noted POA    PRINCIPAL PROBLEM:  Cerebral infarction due to thrombosis of right middle  cerebral artery [I63.311] 06/19/2022 Unknown    AF (paroxysmal atrial fibrillation) [I48.0] 06/29/2018 Yes    Hyperlipidemia [E78.5] 10/17/2019 Yes    KERMIT (acute kidney injury) [N17.9] 03/13/2019 Unknown    Hypertension, essential [I10] 06/16/2016 Yes    Thrombocytopenia [D69.6] 12/16/2018 Unknown    RA (rheumatoid arthritis) [M06.9] 12/13/2021 Yes    PUD (peptic ulcer disease) [K27.9] 03/11/2014 Yes    COPD (chronic obstructive pulmonary disease) [J44.9] 06/20/2022 Unknown    Moderate protein-calorie malnutrition [E44.0] 02/04/2022 Yes    Age-related physical debility [R54] 12/13/2021 Yes    Generalized weakness [R53.1] 11/08/2017 Yes    Iron deficiency anemia secondary to inadequate dietary iron intake [D50.8] 06/24/2013 Yes    Vitamin D insufficiency [E55.9] 02/03/2016 Yes    Discharge planning issues [Z02.9] 06/20/2022 Not Applicable      Problems Resolved During this Admission:     No new Assessment & Plan notes have been filed under this hospital service since the last note was generated.  Service: Vascular Neurology      Recommendations:     Post-discharge complication risks: Falls, Urinary tract infections    Stroke Education given to: patient and family    Follow-up in Stroke Clinic in 4-6 weeks.     Discharge Plan:  Statin: Atorvastatin 40 mg  Anticoagulant: Apixaban 2.5   NOTE: Prior to discharge, patients Cr 1.5. Due to age and Cr, patient placed on reduced dose Apixaban 2.5 mg BID. If repeat CBC/CMP displays improvement in Cr <1.5, patient can resume full dose apixaban 5 mg BID.   Aggresive risk factor modification:  Diet  Exercise  Atrial Fibrillation    Follow Up:   Follow-up Information     Bhargav Hirsch MD Follow up.    Specialties: Family Medicine, Sports Medicine  Why: Please call to schedule your hospital follow-up appointment for within 1 week of discharge from inpatient rehab facility.  Contact information:  [address]  [facility] [address]  [phone]              [facility] VASCULAR NEUROLOGY Follow up in 1 month(s).    Specialty: Vascular Neurology  Contact information:  Olayinka Lyons  [facility] [address]  [phone]                       Patient Instructions:      Diet Cardiac     Notify your health care provider if you experience any of the following:     Notify your health care provider if you experience any of the following:  increased confusion or weakness     Notify your health care provider if you experience any of the following:  persistent dizziness, light-headedness, or visual disturbances     Notify your health care provider if you experience any of the following:  worsening rash     Notify your health care provider if you experience any of the following:  severe persistent headache     Notify your health care provider if you experience any of the following:  difficulty breathing or increased cough     Notify your health care provider if you experience any of the following:  redness, tenderness, or signs of infection (pain, swelling, redness, odor or green/yellow discharge around incision site)     Notify your health care provider if you experience any of the following:  severe uncontrolled pain     Notify your health care provider if you experience any of the following:  persistent nausea and vomiting or diarrhea     Notify your health care provider if you experience any of the following:  temperature >100.4     Activity as tolerated       Medications:  Reconciled Home Medications:      Medication List      START taking these medications    ferrous sulfate 325 (65 FE) MG EC tablet  Take 1 tablet (325 mg total) by mouth once daily.     senna-docusate 8.6-50 mg 8.6-50 mg per tablet  Commonly known as: PERICOLACE  Take 2 tablets by mouth 2 (two) times daily.        CHANGE how you take these medications    apixaban 2.5 mg Tab  Commonly known as: ELIQUIS  Take 1 tablet (2.5 mg total) by mouth 2 (two) times daily.  What changed:   · medication  strength  · how much to take    NOTE: Prior to discharge, patients Cr 1.5. Due to age and Cr, patient placed on reduced dose Apixaban 2.5 mg BID. If repeat CBC/CMP displays improvement in Cr <1.5, patient can resume full dose apixaban 5 mg BID.       CONTINUE taking these medications    acetaminophen 500 MG tablet  Commonly known as: TYLENOL  Take 1 tablet (500 mg total) by mouth every 4 (four) hours as needed for Pain.     amLODIPine 10 MG tablet  Commonly known as: NORVASC  TAKE 1 TABLET(10 MG) BY MOUTH EVERY DAY     atorvastatin 40 MG tablet  Commonly known as: LIPITOR  Take 40 mg by mouth once daily.     baclofen 10 MG tablet  Commonly known as: LIORESAL  Take 0.5-1 tablets (5-10 mg total) by mouth 3 (three) times daily as needed.     bisacodyL 10 mg Supp  Commonly known as: DULCOLAX  Place 1 suppository (10 mg total) rectally daily as needed (constipation).     furosemide 40 MG tablet  Commonly known as: LASIX  Take 1 tablet (40 mg total) by mouth every other day. TAKE 1 TABLET(40 MG) BY MOUTH DAILY     * gabapentin 300 MG capsule  Commonly known as: NEURONTIN  Take 1 capsule (300 mg total) by mouth every evening.     * gabapentin 100 MG capsule  Commonly known as: NEURONTIN  Take 1 capsule (100 mg total) by mouth 2 (two) times daily. Take one 100mg capsule twice a day and one 300mg capsule at bedtime     gabapentin 5% baclofen 2% amitriptyline 2% topical cream  Apply topically 3 (three) times daily.     hydrOXYchloroQUINE 200 mg tablet  Commonly known as: PLAQUENIL  Take 1 tablet (200 mg total) by mouth 2 (two) times daily.     LIDOcaine 5 %  Commonly known as: LIDODERM  Place 1 patch onto the skin once daily. Remove & Discard patch within 12 hours or as directed by MD     magnesium oxide 400 mg (241.3 mg magnesium) tablet  Commonly known as: MAG-OX  Take 400 mg by mouth once daily.     melatonin 12 mg Tab  Take 9 mg by mouth nightly.     montelukast 10 mg tablet  Commonly known as: SINGULAIR  Take 10 mg by  mouth once daily.     mycophenolate 500 mg Tab  Commonly known as: CELLCEPT  TAKE 1 TABLET TWICE A DAY     omeprazole 20 MG capsule  Commonly known as: PRILOSEC  TAKE 1 CAPSULE(20 MG) BY MOUTH EVERY DAY     polyethylene glycol 17 gram Pwpk  Commonly known as: GLYCOLAX  Take 17 g by mouth daily as needed.     predniSONE 10 MG tablet  Commonly known as: DELTASONE  Take 1 tablet (10 mg total) by mouth once daily.     simethicone 80 MG chewable tablet  Commonly known as: MYLICON  Take 1 tablet (80 mg total) by mouth 3 (three) times daily as needed for Flatulence.     thiamine 100 MG tablet  Take 100 mg by mouth once daily.     traMADoL 50 mg tablet  Commonly known as: ULTRAM  Take 1 tablet (50 mg total) by mouth every 6 (six) hours as needed for Pain.     vitamin D 1000 units Tab  Commonly known as: VITAMIN D3  Take 2 tablets (2,000 Units total) by mouth once daily.         * This list has 2 medication(s) that are the same as other medications prescribed for you. Read the directions carefully, and ask your doctor or other care provider to review them with you.            STOP taking these medications    albuterol 90 mcg/actuation inhaler  Commonly known as: PROVENTIL/VENTOLIN HFA     albuterol-ipratropium 2.5 mg-0.5 mg/3 mL nebulizer solution  Commonly known as: DUO-NEB     calcium carbonate 200 mg calcium (500 mg) chewable tablet  Commonly known as: TUMS     famotidine 20 MG tablet  Commonly known as: PEPCID     lacosamide 10 mg/mL Soln oral solution  Commonly known as: AJITH Monson MD  Comprehensive Stroke Center  Department of Vascular Neurology   [facility] Neurosurgery [facility])

## 2022-06-23 NOTE — PROGRESS NOTES
Pt refused to get in/out cath. States that she wants to drink water and get her IVF and see if she can go. MD was notified of refusal, will continue to monitor.

## 2022-06-23 NOTE — SUBJECTIVE & OBJECTIVE
Neurologic Chief Complaint: Lacunar stroke    Subjective:     Interval History: Patient is seen for follow-up neurological assessment and treatment recommendations:     No acute or concerning events noted by overnight staff. Vital signs are stable and within normal limits; mild tachycardia and hypotension noted. Patient is conscious and comfortable. Reports constipation; PRNs added. Bladder scan with >400 mL urine; nursing aware to straight cath if no urinating independently. PT/OT recommending inpatient rehab. Labs notable for thrombocytopenia; possibly related to iron deficiency anemia. If continuing to drop, will consult Hematology/Oncology.     HPI, Past Medical, Family, and Social History remains the same as documented in the initial encounter.     Review of Systems   Constitutional:  Positive for activity change and fatigue. Negative for fever.   HENT:  Negative for facial swelling and hearing loss.    Eyes:  Negative for pain, discharge and visual disturbance.   Respiratory:  Negative for cough and shortness of breath.    Cardiovascular:  Negative for chest pain and palpitations.   Gastrointestinal:  Negative for abdominal pain and nausea.   Musculoskeletal:  Positive for arthralgias. Negative for neck pain.   Skin:  Negative for color change and rash.   Neurological:  Positive for weakness. Negative for facial asymmetry, speech difficulty and numbness.   Psychiatric/Behavioral:  Negative for agitation and confusion.      Scheduled Meds:   amLODIPine  10 mg Oral Daily    apixaban  2.5 mg Oral BID    atorvastatin  40 mg Oral Daily    calcium carbonate  1,000 mg Oral Daily    ferrous sulfate  1 tablet Oral Daily    furosemide  40 mg Oral Every other day    gabapentin  100 mg Oral BID    gabapentin  300 mg Oral QHS    hydrOXYchloroQUINE  200 mg Oral BID    mycophenolate  500 mg Oral BID    polyethylene glycol  17 g Oral BID    senna-docusate 8.6-50 mg  2 tablet Oral BID    sodium chloride 0.9%  1,000 mL  Intravenous Once    vitamin D  2,000 Units Oral Daily     Continuous Infusions:   sodium chloride 0.9%       PRN Meds:acetaminophen, acetaminophen, albuterol, baclofen, labetaloL, sodium chloride 0.9%, sodium chloride 0.9%    Objective:     Vital Signs (Most Recent):  Temp: 98.9 °F (37.2 °C) (06/23/22 0337)  Pulse: 100 (06/23/22 1100)  Resp: 15 (06/23/22 0337)  BP: (!) 113/52 (06/23/22 0337)  SpO2: (!) 94 % (06/23/22 0337)  BP Location: Left arm    Vital Signs Range (Last 24H):  Temp:  [97.2 °F (36.2 °C)-99 °F (37.2 °C)]   Pulse:  []   Resp:  [15-18]   BP: (108-140)/(52-87)   SpO2:  [94 %-98 %]   BP Location: Left arm    Physical Exam  Vitals reviewed.   Constitutional:       General: She is not in acute distress.     Appearance: Normal appearance. She is normal weight. She is not ill-appearing.   HENT:      Head: Normocephalic and atraumatic.      Mouth/Throat:      Mouth: Mucous membranes are moist.      Pharynx: Oropharynx is clear.   Eyes:      General:         Right eye: No discharge.         Left eye: No discharge.      Extraocular Movements: Extraocular movements intact.      Conjunctiva/sclera: Conjunctivae normal.      Pupils: Pupils are equal, round, and reactive to light.   Cardiovascular:      Rate and Rhythm: Normal rate.   Pulmonary:      Effort: Pulmonary effort is normal. No respiratory distress.   Neurological:      Mental Status: She is alert.      Motor: Weakness present.       Neurological Exam:   LOC: alert  Attention Span: Good   Language: No aphasia  Articulation: No dysarthria  Orientation: Person, Place, Time   Visual Fields: Full  EOM (CN III, IV, VI): Full/intact  Pupils (CN II, III): PERRL  Facial Sensation (CN V): Normal  Facial Movement (CN VII): Symmetric facial expression    Motor: Arm left  Paresis: 3-/5; extensor weakness  Leg left  Paresis: 3-/5; flexor weakness  Arm right  Normal 5/5  Leg right Normal 5/5  Cerebellum: No evidence of appendicular or axial ataxia, cannot  perform on left due to weakness  Sensation: Intact to light touch and temperature    Laboratory:  CMP:   Recent Labs   Lab 06/23/22  0754   CALCIUM 8.6*   *   K 4.1   CO2 24      BUN 28*   CREATININE 1.7*       CBC:   Recent Labs   Lab 06/23/22  0754   WBC 11.30   RBC 4.26   HGB 10.3*   HCT 34.1*   PLT 97*   MCV 80*   MCH 24.2*   MCHC 30.2*       Lipid Panel:   Recent Labs   Lab 06/19/22  2045   CHOL 120   LDLCALC 45.0*   HDL 65   TRIG 50       Coagulation:   Recent Labs   Lab 06/20/22  0453   INR 1.1   APTT 23.4       Platelet Aggregation Study: No results for input(s): PLTAGG, PLTAGINTERP, PLTAGREGLACO, ADPPLTAGGREG in the last 168 hours.  Hgb A1C:   Recent Labs   Lab 06/20/22  0226   HGBA1C 5.3       TSH:   Recent Labs   Lab 06/19/22 2045   TSH 2.565         Diagnostic Results     Brain Imaging   MRI Brain Ischemic Inter Pro Incl MRA W/O Con 06/19/2022    Narrative  EXAMINATION:  MRI BRAIN ISCHEMIC INTERVENTIONAL PROTOCOL INCL MRA W/O CONTRAST    CLINICAL HISTORY:  CVA;    TECHNIQUE:  Multiplanar multisequence MR imaging of the brain was performed without the use of intravenous contrast.    Obtained via the same acquisition from the MRI brain, a time-of-flight MR arteriogram of the intracranial vasculature with multiple MIPS reconstructions.    Images were obtained there brain ischemic protocol.    COMPARISON:  CTA 06/19/2022    FINDINGS:  Technically limited exam due to unavailability of MRI head coil.    Brain:    Ventricles and sulci are stable in size and configuration.  Redemonstration of stable right subdural collection.    Linear area of diffusion restriction along the right corona radiata suggestive of acute infarct.  Confluent supratentorial periventricular white matter T2/FLAIR signal suggestive of chronic microvascular ischemic change.  Remote bilateral cerebellar infarcts.  No parenchymal mass, hemorrhage, or edema.    Bone marrow signal intensity unremarkable.    MRA:    Distal internal  carotid arteries are normal in caliber.  No significant stenosis at either carotid bifurcation.    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  The basilar artery appears within normal limits.    SAMMI trifurcation.  The ACAs, MCAs and PCAs demonstrate no evidence of  high-grade stenosis, focal occlusion or intracranial aneurysm.    Impression  Small linear area of diffusion restriction along the right coronary radiata, suggestive of acute infarct.  No large vessel occlusion.    Sequela of chronic microvascular ischemic changes.    Stable right subdural collection.    Remote bilateral cerebellar infarcts.    Dr. Culver discussed preliminary findings with Dr. Guzmán on 06/19/2022 at 22:00.    Electronically signed by resident: Anna Culver  Date:    06/19/2022  Time:    21:48    Electronically signed by: Flo Scott MD  Date:    06/19/2022  Time:    22:08    Vessel Imaging   CTA STROKE MULTI-PHASE 06/19/2022    Narrative  EXAMINATION:  CTA STROKE MULTI-PHASE    CLINICAL HISTORY:  Neuro deficit, acute, stroke suspected;    TECHNIQUE:  Axial CT images obtained throughout the region of the head after the administration of intravenous contrast.  CT angiogram was performed from through the cervical and intracranial vasculature during the IV bolus administration of 100mL of Omnipaque 350.  Multiplanar MPR and MIP reformats were performed.    Additional 2 delayed images of the intracranial vasculature was performed.    Rapid AI assessment were included.    COMPARISON:  CT 03/05/2022 12/13/2021    FINDINGS:  Evaluation is limited due to suboptimal contrast timing and unavailable noncontrast images.    CT head:    Ventricles size and configuration are similar to prior.  There is stable 3 mm low-density subdural collection overlying the right frontal lobe, similar to prior.    The brain appears unchanged.  Supratentorial periventricular white matter hypoattenuation suggestive of chronic  microvascular ischemic change.  Remote bilateral cerebellar infarcts.  Gray and white matter differentiation is preserved.  No parenchymal mass, hemorrhage, edema or major vascular distribution infarct.    Hyperostosis frontalis interna.  No depressed calvarial fracture.  Teeth hardware.  Paranasal sinuses and mastoid air cells are essentially clear.      CTA:    There is stable narrowing right common carotid origin.  Bilateral carotid bifurcation calcifications without significant narrowing, allowing study limitations.  There is significant cavernous and petrous segment calcifications bilaterally.  No significant stenosis.    Right vertebral artery origin is patent.  Proximal left vertebral artery is not visualized..  The cervical vertebral arteries are normal in course and caliber. Vertebrobasilar system is without focal abnormality.    The anterior, middle, and posterior cerebral arteries are within normal limits, without evidence of significant stenosis, focal occlusion, or intracranial aneurysm formation.    Head and neck soft tissue:    Enlarged thyroid with multiple nodules, dominant nodule within the right thyroid lobe extending to the superior mediastinum and measuring 6.5 cm, similar to prior.  There is a mass effect on with leftward shift of the mediastinal structures.    Leftward midline shift of the trachea due to large thyroid, similar to prior.  Ground-glass opacities and subsegmental atelectases within upper lung zones.    Advanced degenerative spine changes.  There is chronic osteomyelitis at the C6-C7 level.    Impression  Limited exam due to suboptimal contrast timing and unavailable noncontrast images.  Additional evaluation, as clinically warranted    No major vascular distribution infarct or large vessel occlusion.    Stable 3 mm subdural collection overlying the right frontal lobe.    Bilateral distal ICAs atherosclerotic disease without significant stenosis.    Significant enlarged thyroid  with multiple nodules, the dominant nodule within the right thyroid lobe is stable in size.  There is a stable significant mass effect on the and superior mediastinal structures with leftward shift.    Ground-glass opacities within the upper lung zones, could represent aspiration, infection, or noninfectious inflammatory process.    Chronic discitis-osteomyelitis at the C6-C7 level.    Additional findings as above.    Electronically signed by resident: Anna Culver  Date:    06/19/2022  Time:    20:40    Electronically signed by: Flo Scott MD  Date:    06/19/2022  Time:    21:48    Cardiac Imaging   Transthoracic echo (TTE) complete 06/20/2022    Interpretation Summary  · The left ventricle is normal in size with normal systolic function.  · The estimated ejection fraction is 55%.  · Normal left ventricular diastolic function.  · Mild-to-moderate mitral regurgitation.  · Normal right ventricular size with normal right ventricular systolic function.  · Trivial posterolateral pericardial effusion.

## 2022-06-23 NOTE — PLAN OF CARE
Problem: Bowel Elimination Impaired (Stroke, Ischemic/Transient Ischemic Attack)  Goal: Effective Bowel Elimination  Outcome: Ongoing, Progressing     Problem: Fall Injury Risk  Goal: Absence of Fall and Fall-Related Injury  Outcome: Ongoing, Progressing     Problem: Skin Injury Risk Increased  Goal: Skin Health and Integrity  Outcome: Ongoing, Progressing     Patient is Aox4, mentation is progressing. Patient has been complaining of constipation and abdominal pain. Tried to go on bedpan twice for AM shift and once for PM shift. Lactulose order was placed for PM shift and still pending. Will ask attending to start stool softener.

## 2022-06-23 NOTE — ASSESSMENT & PLAN NOTE
86 y.o. female with PMHx prior stroke, AFib (on eliquis), HTN, HLD and TIA who presents to [facility] ED with left sided weakness. LKN 2300 6/18/2022. On evaluation patient is hypotensive with left sided hemiparesis. NIHSS 4. Labs CBC/CMP benign; mild Cr increase from priors. Imaging reviewed; CTA limited by contrast extravasation. CTH prelim read with hypodensity in MCA distribution, however, similar to prior CTH films. CTA without LVO. tPA not given as patient OOW and on anticoagulation. Ordered MRI brain acute ischemic protocol for further evaluation as patient is within thrombectomy window. MRI brain with small linear area of diffusion restriction along the right coronary radiata, suggestive of acute infarct. Stroke Etiology: Ischemic Small Vessel Disease (Lacunar). PT/OT recommending inpatient rehab; patient agreeable.    Antithrombotics for secondary stroke prevention: Anticoagulants: Apixaban 5 mg BID     Statins for secondary stroke prevention and hyperlipidemia, if present:   Statins: Atorvastatin- 40 mg daily    Aggressive risk factor modification: HTN, HLD, Diet, Exercise, A-Fib     Rehab efforts: The patient has been evaluated by a stroke team provider and the therapy needs have been fully considered based off the presenting complaints and exam findings. The following therapy evaluations are needed: PT evaluate and treat, OT evaluate and treat, SLP evaluate and treat, PM&R evaluate for appropriate placement    Diagnostics ordered/pending: None     VTE prophylaxis: None: Reason for No Pharmacological VTE Prophylaxis: Currently on anticoagulation    BP parameters: Infarct: No intervention, SBP <220

## 2022-06-23 NOTE — PROGRESS NOTES
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 female with PMHx prior stroke, AFib (on eliquis), HTN, HLD and TIA who presents to [facility] ED with left sided weakness. LKN 2300 6/18/2022. On evaluation patient is hypotensive with left sided hemiparesis. NIHSS 4. Labs CBC/CMP benign; mild Cr increase from priors. Imaging reviewed; CTA limited by contrast extravasation. CTH prelim read with hypodensity in MCA distribution, however, similar to prior CTH films. CTA without LVO. tPA not given as patient OOW and on anticoagulation. Ordered MRI brain acute ischemic protocol for further evaluation as patient is within thrombectomy window. MRI brain with small linear area of diffusion restriction along the right coronary radiata, suggestive of acute infarct. Stroke Etiology: Ischemic Small Vessel Disease (Lacunar). PT/OT recommending inpatient rehab; patient agreeable.    Antithrombotics for secondary stroke prevention: Anticoagulants: Apixaban 5 mg BID     Statins for secondary stroke prevention and hyperlipidemia, if present:   Statins: Atorvastatin- 40 mg daily    Aggressive risk factor modification: HTN, HLD, Diet, Exercise, A-Fib     Rehab efforts: The patient has been evaluated by a stroke team provider and the therapy needs have been fully considered based off the presenting complaints and exam findings. The following therapy evaluations are needed: PT evaluate and treat, OT evaluate and treat, SLP evaluate and treat, PM&R evaluate for appropriate placement    Diagnostics ordered/pending: None     VTE prophylaxis: None: Reason for No Pharmacological VTE Prophylaxis: Currently on anticoagulation    BP parameters: Infarct: No intervention, SBP <220    AF (paroxysmal atrial fibrillation)  Stroke risk factor  On eliquis  Not a candidate for tPA    Hyperlipidemia  Stroke  risk factor  LDL < 70  Continue atorvastatin 40 mg QD    KERMIT (acute kidney injury)  In setting of decreased PO intake. Given fluid bolus.  Monitor Cr daily    Hypertension, essential  Stroke risk factor  Workup on going  No hemorrhage on CTA  Continue amlodipine    Thrombocytopenia  Possibly 2/2 famotidine  Discontinuing medication and re-evaluate CBC QD; plts still low. If continuing to drop, will consult hematology oncology for evaluation    RA (rheumatoid arthritis)  Continue home medications    PUD (peptic ulcer disease)  Continue home medications    COPD (chronic obstructive pulmonary disease)  PRN albuterol per home medication review    Age-related physical debility  PT/OT    Moderate protein-calorie malnutrition  Nutrition Consult; appreciate recommendations     Generalized weakness  PT/OT    Iron deficiency anemia secondary to inadequate dietary iron intake  - ferrous sulphate supplement    Vitamin D insufficiency  Continue home medications    Discharge planning issues  PT/OT recommending inpatient rehab  - inpatient rehab         No notes on file    STROKE DOCUMENTATION   Acute Stroke Times   Last Known Normal Date: 06/18/22  Last Known Normal Time: 2300  Symptom Onset Date: 06/19/22  Symptom Onset Time: 0830  Stroke Team Called Date: 06/19/22  Stroke Team Called Time: 1950  Stroke Team Arrival Date: 06/19/22  Stroke Team Arrival Time: 1955  CT Interpretation Time: 2015  Alteplase Recommended: No  CTA Interpretation Time: 2020    NIH Scale:  1a. Level of Consciousness: 0-->Alert, keenly responsive  1b. LOC Questions: 0-->Answers both questions correctly  1c. LOC Commands: 0-->Performs both tasks correctly  2. Best Gaze: 0-->Normal  3. Visual: 0-->No visual loss  4. Facial Palsy: 0-->Normal symmetrical movements  5a. Motor Arm, Left: 2-->Some effort against gravity, limb cannot get to or maintain (if cued) 90 (or 45) degrees, drifts down to bed, but has some effort against gravity  5b. Motor Arm, Right:  0-->No drift, limb holds 90 (or 45) degrees for full 10 secs  6a. Motor Leg, Left: 2-->Some effort against gravity, leg falls to bed by 5 secs, but has some effort against gravity  6b. Motor Leg, Right: 0-->No drift, leg holds 30 degree position for full 5 secs  7. Limb Ataxia: 0-->Absent  8. Sensory: 0-->Normal, no sensory loss  9. Best Language: 0-->No aphasia, normal  10. Dysarthria: 0-->Normal  11. Extinction and Inattention (formerly Neglect): 0-->No abnormality  Total (NIH Stroke Scale): 4       Modified Moffat Score: 1  Marion Coma Scale:    ABCD2 Score:    [id number] Score:8  HAS -BLED Score:   ICH Score:   Hunt & Jain Classification:      Hemorrhagic change of an Ischemic Stroke: Does this patient have an ischemic stroke with hemorrhagic changes? No     Neurologic Chief Complaint: Lacunar stroke    Subjective:     Interval History: Patient is seen for follow-up neurological assessment and treatment recommendations:     No acute or concerning events noted by overnight staff. Vital signs are stable and within normal limits; mild tachycardia and hypotension noted. Patient is conscious and comfortable. Reports constipation; PRNs added. Bladder scan with >400 mL urine; nursing aware to straight cath if no urinating independently. PT/OT recommending inpatient rehab. Labs notable for thrombocytopenia; possibly related to iron deficiency anemia. If continuing to drop, will consult Hematology/Oncology.     HPI, Past Medical, Family, and Social History remains the same as documented in the initial encounter.     Review of Systems   Constitutional:  Positive for activity change and fatigue. Negative for fever.   HENT:  Negative for facial swelling and hearing loss.    Eyes:  Negative for pain, discharge and visual disturbance.   Respiratory:  Negative for cough and shortness of breath.    Cardiovascular:  Negative for chest pain and palpitations.   Gastrointestinal:  Negative for abdominal pain and nausea.    Musculoskeletal:  Positive for arthralgias. Negative for neck pain.   Skin:  Negative for color change and rash.   Neurological:  Positive for weakness. Negative for facial asymmetry, speech difficulty and numbness.   Psychiatric/Behavioral:  Negative for agitation and confusion.      Scheduled Meds:   amLODIPine  10 mg Oral Daily    apixaban  2.5 mg Oral BID    atorvastatin  40 mg Oral Daily    calcium carbonate  1,000 mg Oral Daily    ferrous sulfate  1 tablet Oral Daily    furosemide  40 mg Oral Every other day    gabapentin  100 mg Oral BID    gabapentin  300 mg Oral QHS    hydrOXYchloroQUINE  200 mg Oral BID    mycophenolate  500 mg Oral BID    polyethylene glycol  17 g Oral BID    senna-docusate 8.6-50 mg  2 tablet Oral BID    sodium chloride 0.9%  1,000 mL Intravenous Once    vitamin D  2,000 Units Oral Daily     Continuous Infusions:   sodium chloride 0.9%       PRN Meds:acetaminophen, acetaminophen, albuterol, baclofen, labetaloL, sodium chloride 0.9%, sodium chloride 0.9%    Objective:     Vital Signs (Most Recent):  Temp: 98.9 °F (37.2 °C) (06/23/22 0337)  Pulse: 100 (06/23/22 1100)  Resp: 15 (06/23/22 0337)  BP: (!) 113/52 (06/23/22 0337)  SpO2: (!) 94 % (06/23/22 0337)  BP Location: Left arm    Vital Signs Range (Last 24H):  Temp:  [97.2 °F (36.2 °C)-99 °F (37.2 °C)]   Pulse:  []   Resp:  [15-18]   BP: (108-140)/(52-87)   SpO2:  [94 %-98 %]   BP Location: Left arm    Physical Exam  Vitals reviewed.   Constitutional:       General: She is not in acute distress.     Appearance: Normal appearance. She is normal weight. She is not ill-appearing.   HENT:      Head: Normocephalic and atraumatic.      Mouth/Throat:      Mouth: Mucous membranes are moist.      Pharynx: Oropharynx is clear.   Eyes:      General:         Right eye: No discharge.         Left eye: No discharge.      Extraocular Movements: Extraocular movements intact.      Conjunctiva/sclera: Conjunctivae normal.       Pupils: Pupils are equal, round, and reactive to light.   Cardiovascular:      Rate and Rhythm: Normal rate.   Pulmonary:      Effort: Pulmonary effort is normal. No respiratory distress.   Neurological:      Mental Status: She is alert.      Motor: Weakness present.       Neurological Exam:   LOC: alert  Attention Span: Good   Language: No aphasia  Articulation: No dysarthria  Orientation: Person, Place, Time   Visual Fields: Full  EOM (CN III, IV, VI): Full/intact  Pupils (CN II, III): PERRL  Facial Sensation (CN V): Normal  Facial Movement (CN VII): Symmetric facial expression    Motor: Arm left  Paresis: 3-/5; extensor weakness  Leg left  Paresis: 3-/5; flexor weakness  Arm right  Normal 5/5  Leg right Normal 5/5  Cerebellum: No evidence of appendicular or axial ataxia, cannot perform on left due to weakness  Sensation: Intact to light touch and temperature    Laboratory:  CMP:   Recent Labs   Lab 06/23/22  0754   CALCIUM 8.6*   *   K 4.1   CO2 24      BUN 28*   CREATININE 1.7*       CBC:   Recent Labs   Lab 06/23/22  0754   WBC 11.30   RBC 4.26   HGB 10.3*   HCT 34.1*   PLT 97*   MCV 80*   MCH 24.2*   MCHC 30.2*       Lipid Panel:   Recent Labs   Lab 06/19/22  2045   CHOL 120   LDLCALC 45.0*   HDL 65   TRIG 50       Coagulation:   Recent Labs   Lab 06/20/22  0453   INR 1.1   APTT 23.4       Platelet Aggregation Study: No results for input(s): PLTAGG, PLTAGINTERP, PLTAGREGLACO, ADPPLTAGGREG in the last 168 hours.  Hgb A1C:   Recent Labs   Lab 06/20/22  0226   HGBA1C 5.3       TSH:   Recent Labs   Lab 06/19/22  2045   TSH 2.565         Diagnostic Results     Brain Imaging   MRI Brain Ischemic Inter Pro Incl MRA W/O Con 06/19/2022    Narrative  EXAMINATION:  MRI BRAIN ISCHEMIC INTERVENTIONAL PROTOCOL INCL MRA W/O CONTRAST    CLINICAL HISTORY:  CVA;    TECHNIQUE:  Multiplanar multisequence MR imaging of the brain was performed without the use of intravenous contrast.    Obtained via the same acquisition  from the MRI brain, a time-of-flight MR arteriogram of the intracranial vasculature with multiple MIPS reconstructions.    Images were obtained there brain ischemic protocol.    COMPARISON:  CTA 06/19/2022    FINDINGS:  Technically limited exam due to unavailability of MRI head coil.    Brain:    Ventricles and sulci are stable in size and configuration.  Redemonstration of stable right subdural collection.    Linear area of diffusion restriction along the right corona radiata suggestive of acute infarct.  Confluent supratentorial periventricular white matter T2/FLAIR signal suggestive of chronic microvascular ischemic change.  Remote bilateral cerebellar infarcts.  No parenchymal mass, hemorrhage, or edema.    Bone marrow signal intensity unremarkable.    MRA:    Distal internal carotid arteries are normal in caliber.  No significant stenosis at either carotid bifurcation.    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  The basilar artery appears within normal limits.    SAMMI trifurcation.  The ACAs, MCAs and PCAs demonstrate no evidence of  high-grade stenosis, focal occlusion or intracranial aneurysm.    Impression  Small linear area of diffusion restriction along the right coronary radiata, suggestive of acute infarct.  No large vessel occlusion.    Sequela of chronic microvascular ischemic changes.    Stable right subdural collection.    Remote bilateral cerebellar infarcts.    Dr. Culver discussed preliminary findings with Dr. Guzmán on 06/19/2022 at 22:00.    Electronically signed by resident: Anna Culver  Date:    06/19/2022  Time:    21:48    Electronically signed by: Flo Scott MD  Date:    06/19/2022  Time:    22:08    Vessel Imaging   CTA STROKE MULTI-PHASE 06/19/2022    Narrative  EXAMINATION:  CTA STROKE MULTI-PHASE    CLINICAL HISTORY:  Neuro deficit, acute, stroke suspected;    TECHNIQUE:  Axial CT images obtained throughout the region of the head after the  administration of intravenous contrast.  CT angiogram was performed from through the cervical and intracranial vasculature during the IV bolus administration of 100mL of Omnipaque 350.  Multiplanar MPR and MIP reformats were performed.    Additional 2 delayed images of the intracranial vasculature was performed.    Rapid AI assessment were included.    COMPARISON:  CT 03/05/2022 12/13/2021    FINDINGS:  Evaluation is limited due to suboptimal contrast timing and unavailable noncontrast images.    CT head:    Ventricles size and configuration are similar to prior.  There is stable 3 mm low-density subdural collection overlying the right frontal lobe, similar to prior.    The brain appears unchanged.  Supratentorial periventricular white matter hypoattenuation suggestive of chronic microvascular ischemic change.  Remote bilateral cerebellar infarcts.  Gray and white matter differentiation is preserved.  No parenchymal mass, hemorrhage, edema or major vascular distribution infarct.    Hyperostosis frontalis interna.  No depressed calvarial fracture.  Teeth hardware.  Paranasal sinuses and mastoid air cells are essentially clear.      CTA:    There is stable narrowing right common carotid origin.  Bilateral carotid bifurcation calcifications without significant narrowing, allowing study limitations.  There is significant cavernous and petrous segment calcifications bilaterally.  No significant stenosis.    Right vertebral artery origin is patent.  Proximal left vertebral artery is not visualized..  The cervical vertebral arteries are normal in course and caliber. Vertebrobasilar system is without focal abnormality.    The anterior, middle, and posterior cerebral arteries are within normal limits, without evidence of significant stenosis, focal occlusion, or intracranial aneurysm formation.    Head and neck soft tissue:    Enlarged thyroid with multiple nodules, dominant nodule within the right thyroid lobe extending to  the superior mediastinum and measuring 6.5 cm, similar to prior.  There is a mass effect on with leftward shift of the mediastinal structures.    Leftward midline shift of the trachea due to large thyroid, similar to prior.  Ground-glass opacities and subsegmental atelectases within upper lung zones.    Advanced degenerative spine changes.  There is chronic osteomyelitis at the C6-C7 level.    Impression  Limited exam due to suboptimal contrast timing and unavailable noncontrast images.  Additional evaluation, as clinically warranted    No major vascular distribution infarct or large vessel occlusion.    Stable 3 mm subdural collection overlying the right frontal lobe.    Bilateral distal ICAs atherosclerotic disease without significant stenosis.    Significant enlarged thyroid with multiple nodules, the dominant nodule within the right thyroid lobe is stable in size.  There is a stable significant mass effect on the and superior mediastinal structures with leftward shift.    Ground-glass opacities within the upper lung zones, could represent aspiration, infection, or noninfectious inflammatory process.    Chronic discitis-osteomyelitis at the C6-C7 level.    Additional findings as above.    Electronically signed by resident: Anna Culver  Date:    06/19/2022  Time:    20:40    Electronically signed by: Flo Scott MD  Date:    06/19/2022  Time:    21:48    Cardiac Imaging   Transthoracic echo (TTE) complete 06/20/2022    Interpretation Summary  · The left ventricle is normal in size with normal systolic function.  · The estimated ejection fraction is 55%.  · Normal left ventricular diastolic function.  · Mild-to-moderate mitral regurgitation.  · Normal right ventricular size with normal right ventricular systolic function.  · Trivial posterolateral pericardial effusion.        Cliff Monson MD  Comprehensive Stroke Center  Department of Vascular Neurology   [facility])

## 2022-06-23 NOTE — RESPIRATORY THERAPY
RAPID RESPONSE RESPIRATORY CHART CHECK       Chart check completed.  Please call [phone] for further concerns or assistance.

## 2022-06-23 NOTE — DISCHARGE INSTRUCTIONS
As a reminder, please have [facility] draw morning CBC/CMP to assess Na, Cr, and platelets.     NOTE: Prior to discharge, patients Cr 1.5. Due to age and Cr, patient placed on reduced dose Apixaban 2.5 mg BID. If repeat CBC/CMP displays improvement in Cr <1.5 at [facility], patient can resume full dose apixaban 5 mg BID.

## 2022-06-25 NOTE — PROGRESS NOTES
1/5/18  CM reviewed response to message sent to Dr. Thomson, Physical Medicine and Rehab and Outpatient PT visit summary 1/2/18.   Coordination of care for Inpt Rehab with LCSW, PCP.   Further collaboration with Carola Saldivar RN, [facility] Inpt Rehab Admissions Liasion to facilitate pt eval for Inpt Rehab. LCSW spoke on phone directly with liaison, CM present with outcome--Dr. Lux to be evaluated per consult in [facility] from Dr. Lee, PCP 12/12/17. See summary in update to PCP.     Dr. Lee/Staff:  Update---we have spoken by phone with [facility] Ad[facility] Liaison Inpt Rehab TEL:  [phone] who will have pt's case reviewed for Inpt Rehab eval per your consult 12/12/17. They will have bed openings next week and because pt has Medicare she can be admitted quickly if she meet criteria for Inpt Rehab. Dgt's contact provided.   DaughterRosy was informed of this development. It was stressed by LCSW that the Rehab facility is pt/family choice, to visit the facility. A list of facilities for Inpt Rehab was provided during previous face-to-face encounter and daughter was encouraged to review/visit other sites if not satisfied with [facility]. Rosy reports being familiar with [facility] from a previous pt episode of care & was satisfied.   I spoke with Kayleen Bañuelos, PT working with pt. Provided this update to Kayleen FANG. Pt's condition continues to require ongoing cueing and assistance with amb and will require 24/7 supervision and care even with Inpt Rehab.   Will continue to update.   Thank you Dr. Lee for all of your assistance.     Domonique Baez, JOSEN, RN, CCM [facility] Case [facility]  TEL:  [phone]     Mother states that her daughter is sick with cold like symptoms, she says that her daughter is supposed to have a visit with her biological father and she is looking for a note that says that the child shouldn't leave her

## 2022-07-06 NOTE — PROGRESS NOTES
"                                                    Physical Therapy Progress Note     Name: Selma Alonzo Lux MD  Clinic Number: [id number]  Diagnosis:   Encounter Diagnosis   Name Primary?    Impaired functional mobility, balance, gait, and endurance      Physician: Dee Thomson, *  Treatment Orders: PT Eval and Treat  Past Medical History:   Diagnosis Date    Anemia     Arthritis     Hashimoto's disease     Hypertension     IGT (impaired glucose tolerance) 1/12/2016    Joint pain     Multinodular goiter 1/12/2016     Re-Evaluation Date: 12/12/17  Total 2017 Visit #: 6  2018 Visit #: 11  Extended POC Expiration: 01/30/18  Extended POC Expiration: 02/27/18  Precautions: universal, visual impairment, impaired memory      G codes 2/10             Subjective   Pt reports: that she's feeling good this morning but that the L side of her neck has been hurting since she woke up this morning.   Pain Scale:  7/10 L sided neck pain, 3/10 post-intervention    Objective     Patient received individual therapy with activities as follows:     Selma received the following manual therapy techniques were applied for 20 minutes.      Supine:              PROM lower cervical rotation L<>R x 10x each direction with stretch held at end range; lower cervical flexion x 10              PROM upper cervical extension <> flexion; increased stiffness noted; increased muscle guarding noted              3 x 30" B UT stretch   Grades II-III L UPAs at C3, C4, C5, C6   Upglides at C3, C4, C5, C6 with lower cervical rotation to R                Therapeutic exercises to increase strength and endurance x 25  min including;    X 10 min on Nu Step recumbent stepper .  Level 5.0 for improved CV endurance.     X 10 supine deep neck flexor, 3" hold, TCs to perform correctly    X 10 forward step ups onto 6" block c/ each LE; 1 UE support   2 x 10 R<>L side steps over large bolster, BUE support, SBA  2 x 10 standing BLE heel raises, BUE " "support, CGA    X 4 laps backward ambulation; 1 UE support, SBA  X 4 laps braiding R<>L, foot crossing over in front, 1 UE support, SBA    30" x 2 each LE, SLS with alternate LE placed on 4pt foam fitter, CGA, occ touchdown support    Written Home Exercises: Continue HEP   Supine Quad sets, SLR, bridges, hip abd/add, Heel slides  Pt demo good understanding of the education provided. Selma demonstrated good return demonstration of activities.     Education provided re: POC, HEP  No spiritual or educational barriers to learning provided    Pt has no cultural, educational or language barriers to learning provided.    Assessment   Selma tolerated therapy session well this morning but continues to c/o significant L sided neck pain which has progressively gotten worse over past month. Pt reports improved stiffness and pain following manual stretching and mobilizations, but L sided neck pain still persistent.  Pt continues to demonstrate improved static and dynamic balance, but does require 1 UE support for improved steadiness. Pt continues to demonstrate forward flexed posture during gait, but step length and BLE clearance during ambulation appeared improved. Once current POC for neuro PT has ended (for balance and ambulation deficits), pt will benefit from ortho PT to further address neck pain since this appears to be pt's primary complaint at this time. Cont. POC.       Pt rehab potential is Good. Pt will benefit from continuing skilled outpatient physical therapy to address the deficits listed below in the problem list, provide pt/family education and to maximize pt's level of independence in the home and community environment.      History  Co-morbidities and personal factors that may impact the plan of care Examination  Body Structures and Functions, activity limitations and participation restrictions that may impact the plan of care. Medical necessity is demonstrated by the following impairments. Clinical " Presentation Decision Making/ Complexity Score   1. OA of multiple joints  2. LBP  3. Visual deficits  4. Cognitive deficits  5. B rotator cuff repair  6. Vascular dementia 1. Decreased balance  2. Gait deficits  3. Increased fall risk  4. Decreased LE strength  5. Impaired endurance  6. Postural deficits evolving-unpredictable symptom presentation, cognitive deficits     high high moderate moderate         Pt's spiritual, cultural and educational needs considered and pt agreeable to plan of care and goals as stated below:      GOALS:   Short term goals: 4 weeks, pt agrees to goals set.  7. Pt to report performing HEP daily to increased IND. MET  8. Pt to demonstrate nadiya hip flexion MMT score to 4/5 to improve functional mobility. MET  9. Pt to demonstrate L hip abduction MMT score to 4/5 to improve functional mobility. MET  10. Pt to improve L knee extension MMT score to 4/5 to improve functional mobility MET  11. Pt to improve L knee flexion MMT score to 4/5 to improve functional mobility MET  12. Pt to improve chair rise score to at least 8 with no hands for improved endurance with functional mobility MET  13. Pt to improve MCTSIB condition 4 score to at least 15 seconds for improved balance and decreased fall risk. MET  14. Pt to improve TUG score with SPC to no more than 16 seconds for improved safety with ambulation MET  15. Pt to improve SSWS to at least 0.75 m/sec for improved safety with community ambulation. MET     Long term goals: 8 weeks, pt agrees to goals set  11. Pt to demonstrate more upright standing/seated head/shoulder posture to improve balance.  12. Pt to improve SSWS to at least 0.86m/sec for improved safety with community ambulation.   13. Pt to demonstrate nadiya hip flexion strength of 4+/5 to improve functional mobility.  14. Pt to demonstrate L hip abduction strength to 4+/5 to improve functional mobility.  16. Pt to improve L knee extension MMT score to 4/+5 to improve functional mobility  MET  17. Pt to improve L knee flexion MMT score to 4+/5 to improve functional mobility  18. Pt to improve chair rise score to at least 9 with no hands for improved endurance with functional mobility MET  19. Pt to improve MCTSIB condition 4 score to at least 20 seconds for improved balance and decreased fall risk. MET  20. Pt to improve TUG score with SPC to no more than 14 seconds for improved safety with ambulation MET  21. Pt to improve FOTO score to at least 60% for improved self concept with functional mobility.     Plan   Outpatient physical therapy 2 times weekly to include: Pt Education, HEP, therapeutic exercises, neuromuscular re-education, therapeutic activities, manual therapy, joint mobilizations, aquatic therapy and modalities PRN to achieve established goals. Pt may be seen by PTA as part of the rehabilitation team.      Cont PT for  8 weeks.     Kayleen Bañuelos, PT   02/15/2018         none

## 2022-07-14 ENCOUNTER — TELEPHONE (OUTPATIENT)
Dept: PULMONOLOGY | Facility: CLINIC | Age: 87
End: 2022-07-14
Payer: MEDICARE

## 2022-07-14 NOTE — TELEPHONE ENCOUNTER
I spoke to patient's daughter, Ms Rosado. She was calling to let Dr Francis know patient was admitted here from having a storke on 6/23/22 and then transferred to [facility] for rehab. Patient is now admitted to ICU at [facility] (room 604). Ms Rosado stated they do not have access to patient's records from here. Ms Rosado stated if they had access to her records it would be helpful. I let her know I would send a message to Dr Francis for review and to advise. Ms Rosado verbalized understanding.

## 2022-07-14 NOTE — TELEPHONE ENCOUNTER
----- Message from Agnieszka Rodriguez sent at 7/14/2022  3:56 PM CDT -----  Selma Lux daughter Rosy calling regarding a call back but no details given.  call back [phone]  pt is in the hospital

## 2022-07-14 NOTE — TELEPHONE ENCOUNTER
Please advise that Chandnio MDs should be able to see reports of test results in [facility].  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  DET

## 2022-07-18 NOTE — NURSING
Called [facility] and spoke with Darshan Harris RN to give patient report.  All questions answered to his satisfaction.  Patient ready for transport.   Rhombic Flap Text: The defect edges were debeveled with a #15 scalpel blade.  Given the location of the defect and the proximity to free margins a rhombic flap was deemed most appropriate.  Using a sterile surgical marker, an appropriate rhombic flap was drawn incorporating the defect.    The area thus outlined was incised deep to adipose tissue with a #15 scalpel blade.  The skin margins were undermined to an appropriate distance in all directions utilizing iris scissors.

## 2022-07-19 ENCOUNTER — HOME CARE VISIT (OUTPATIENT)
Dept: NEUROLOGY | Facility: HOSPITAL | Age: 87
End: 2022-07-19
Payer: MEDICARE

## 2022-08-05 ENCOUNTER — PATIENT OUTREACH (OUTPATIENT)
Dept: ADMINISTRATIVE | Facility: CLINIC | Age: 87
End: 2022-08-05
Payer: MEDICARE

## 2022-08-05 ENCOUNTER — EXTERNAL HOSPITAL ADMISSION (OUTPATIENT)
Dept: ADMINISTRATIVE | Facility: CLINIC | Age: 87
End: 2022-08-05
Payer: MEDICARE

## 2022-08-19 ENCOUNTER — OFFICE VISIT (OUTPATIENT)
Dept: ENDOCRINOLOGY | Facility: CLINIC | Age: 87
End: 2022-08-19
Payer: MEDICARE

## 2022-08-19 DIAGNOSIS — R76.8 THYROID ANTIBODY POSITIVE: ICD-10-CM

## 2022-08-19 DIAGNOSIS — E04.2 MULTINODULAR GOITER: Primary | ICD-10-CM

## 2022-08-19 PROCEDURE — 99214 OFFICE O/P EST MOD 30 MIN: CPT | Mod: 95,,, | Performed by: INTERNAL MEDICINE

## 2022-08-19 PROCEDURE — 99214 PR OFFICE/OUTPT VISIT, EST, LEVL IV, 30-39 MIN: ICD-10-PCS | Mod: 95,,, | Performed by: INTERNAL MEDICINE

## 2022-08-19 NOTE — PROGRESS NOTES
The patient location is: [place]  The chief complaint leading to consultation is: thyroid    Visit type: audiovisual    Face to Face time with patient: 11 minutes  20 minutes of total time spent on the encounter, which includes face to face time and non-face to face time preparing to see the patient (eg, review of tests), Obtaining and/or reviewing separately obtained history, Documenting clinical information in the electronic or other health record, Independently interpreting results (not separately reported) and communicating results to the patient/family/caregiver, or Care coordination (not separately reported).     Each patient to whom he or she provides medical services by telemedicine is:  (1) informed of the relationship between the physician and patient and the respective role of any other health care provider with respect to management of the patient; and (2) notified that he or she may decline to receive medical services by telemedicine and may withdraw from such care at any time.    Notes:     Subjective:      Chief Complaint: Thyroid Nodule    HPI: Selma Lux is a 86 y.o. female who is here for a follow-up evaluation for thyroid. Last seen 5/10/2021    Of note, pt had stroke a few months ago.   back from rehiab program a few weeks ago    At that time recommended f/u after 1 year with ultrasound before. Patient returns today 6 months early.    With regards to the thyroid nodule:  The thyroid nodule was found on Imaging. MRI many years ago.     Also has hx hashimotos, TPO in 2007 was about 1400 per prior notes. Last lab here, 2011, TPO was 428. Hasn't needed thyroid medication, TSH has been normal for the most part.      Last TSH:   Lab Results   Component Value Date    TSH 2.565 06/19/2022    I0BXIXA 5.8 08/29/2019    FREET4 1.01 05/06/2022     Last US 5/2021. Similar to prior.   4.2 cm right lower   2.4 cm    Prior: 10/2020:  Stable from prior.  Large right sided nodule (3.7x4.3x4.4 cm) -> benign  FNA 1/2020  Left sided 1.7 cm hyperechoic nodule -> benign FNA 1/2020     Prior ultrasound: 8/2015   - Very large right lobe. Has a nodule, was 3.5x3.24x3.25 cm in size, isoechoic with cystic degeneration   - left lobe nodule 1x0.7x1cm.  Has multiple neck images since then, last 8/2019 which noted enlarged right thyroid, substernal extension, tracheal deviation.     Had seen endocrine as well as endocrine surgery in the past, was considering surgery (last seen Dr. Wild 5/2016). Had symptoms of dysphagia, hoarseness. Had surgery planned for late 2015, but had life issues come up and postponed surgery. Was rescheduled, due for surgery around June 2016. But then hosptializations, strokes, multiple other issues came up. Hasn't had thyroid surgery.     FNA done? 2006, benign per prior notes.   then again 2020. Benign    Has low vitamin D.  Last labs:    Lab Results   Component Value Date    CALCIUM 8.7 06/23/2022    ALBUMIN 3.8 06/21/2022    CREATININE 1.5 (H) 06/23/2022    [id number] 24 (L) 05/06/2022     Not taking vitamin D currently    No FH thyroid cancer.  No personal history of radiation exposure.    Today, pt dealing with reports feeling okay overall.    Occasional swallowing problems  occasional voice changes too.   sometimes cough/tickle in the throat   some SOB with exertion, some with laying down    No palpitations.     Reviewed past medical, family, social history and updated as appropriate.    Review of Systems  As above    Objective:     There were no vitals filed for this visit.   Prior vitals:    BP Readings from Last 5 Encounters:   06/23/22 (!) 116/58   06/15/22 119/72   03/05/22 (!) 146/97   02/08/22 (!) 123/58   01/04/22 (!) 122/58     Physical Exam  Constitutional:       General: She is not in acute distress.  Pulmonary:      Effort: Pulmonary effort is normal.       Wt Readings from Last 10 Encounters:   06/21/22 1301 63.5 kg (139 lb 15.9 oz)   06/19/22 1946 63.5 kg (140 lb)   03/05/22 1046 68.5  kg (151 lb)   02/04/22 1200 68.5 kg (151 lb 0.2 oz)   02/01/22 1156 68.5 kg (151 lb 0.2 oz)   01/27/22 1329 68.5 kg (151 lb)   01/24/22 0900 68.5 kg (151 lb)   01/23/22 1118 68.5 kg (151 lb)   12/19/21 0500 67 kg (147 lb 11.3 oz)   12/18/21 0400 66.5 kg (146 lb 9.7 oz)   12/13/21 1700 67 kg (147 lb 11.3 oz)   12/13/21 1240 67 kg (147 lb 11.3 oz)   12/09/21 2000 68.5 kg (151 lb)   12/07/21 1100 68.5 kg (151 lb)   10/12/21 1309 71.7 kg (158 lb)   10/08/21 1008 71.7 kg (158 lb)   10/01/21 1535 71.7 kg (158 lb 1.1 oz)   09/30/21 1358 71.7 kg (158 lb 1.1 oz)     Lab Results   Component Value Date    HGBA1C 5.3 06/20/2022     Lab Results   Component Value Date    CHOL 120 06/19/2022    HDL 65 06/19/2022    LDLCALC 45.0 (L) 06/19/2022    TRIG 50 06/19/2022    CHOLHDL 54.2 (H) 06/19/2022     Lab Results   Component Value Date     (L) 06/23/2022    K 4.1 06/23/2022    CL 99 06/23/2022    CO2 24 06/23/2022    GLU 96 06/23/2022    BUN 28 (H) 06/23/2022    CREATININE 1.5 (H) 06/23/2022    CALCIUM 8.7 06/23/2022    PROT 6.9 06/21/2022    ALBUMIN 3.8 06/21/2022    BILITOT 1.4 (H) 06/21/2022    ALKPHOS 66 06/21/2022    AST 12 06/21/2022    ALT 9 (L) 06/21/2022    ANIONGAP 8 06/23/2022    ESTGFRAFRICA 36.6 (A) 06/23/2022    EGFRNONAA 31.8 (A) 06/23/2022    TSH 2.565 06/19/2022        Assessment/Plan:     Multinodular goiter  Pt with large right sided nodule, compressive symptoms.   - had surgery evaluation before, cancelled surgery 2x. Had a lot of other things come up, multiple hospitalizations, rehab/recovery time, etc.   - at last few visits, recommended again surgery evaluation, patient declined   since then, pt had another stroke, in hospital/rehab for a while. Not interested in surgery at this time.   recheck US next year, f/u and re-evaluate after that.    Thyroid antibody positive  Has elevated TPO in the past    - last TSH normal   monitor TSH from time to time      Follow up in about 6 months (around 2/19/2023) for  lab review, further monitoring, imaging review.      Rashad Monroy MD  Endocrinology

## 2022-08-19 NOTE — ASSESSMENT & PLAN NOTE
Pt with large right sided nodule, compressive symptoms.   - had surgery evaluation before, cancelled surgery 2x. Had a lot of other things come up, multiple hospitalizations, rehab/recovery time, etc.   - at last few visits, recommended again surgery evaluation, patient declined   since then, pt had another stroke, in hospital/rehab for a while. Not interested in surgery at this time.   recheck US next year, f/u and re-evaluate after that.

## 2022-08-22 ENCOUNTER — HOME CARE VISIT (OUTPATIENT)
Dept: NEUROLOGY | Facility: HOSPITAL | Age: 87
End: 2022-08-22
Payer: MEDICARE

## 2022-08-23 ENCOUNTER — OFFICE VISIT (OUTPATIENT)
Dept: OPTOMETRY | Facility: CLINIC | Age: 87
End: 2022-08-23
Payer: MEDICARE

## 2022-08-23 DIAGNOSIS — H52.13 MYOPIA OF BOTH EYES WITH ASTIGMATISM AND PRESBYOPIA: Primary | ICD-10-CM

## 2022-08-23 DIAGNOSIS — H52.4 MYOPIA OF BOTH EYES WITH ASTIGMATISM AND PRESBYOPIA: Primary | ICD-10-CM

## 2022-08-23 DIAGNOSIS — H52.203 MYOPIA OF BOTH EYES WITH ASTIGMATISM AND PRESBYOPIA: Primary | ICD-10-CM

## 2022-08-23 PROCEDURE — 92015 DETERMINE REFRACTIVE STATE: CPT | Mod: ,,, | Performed by: OPTOMETRIST

## 2022-08-23 PROCEDURE — 99499 NO LOS: ICD-10-PCS | Mod: S$PBB,,, | Performed by: OPTOMETRIST

## 2022-08-23 PROCEDURE — 99499 UNLISTED E&M SERVICE: CPT | Mod: S$PBB,,, | Performed by: OPTOMETRIST

## 2022-08-23 PROCEDURE — 99999 PR PBB SHADOW E&M-EST. PATIENT-LVL II: ICD-10-PCS | Mod: PBBFAC,,, | Performed by: OPTOMETRIST

## 2022-08-23 PROCEDURE — 99214 OFFICE O/P EST MOD 30 MIN: CPT | Mod: PBBFAC,27,PO | Performed by: OPTOMETRIST

## 2022-08-23 PROCEDURE — 92015 PR REFRACTION: ICD-10-PCS | Mod: ,,, | Performed by: OPTOMETRIST

## 2022-08-23 PROCEDURE — 99999 PR PBB SHADOW E&M-EST. PATIENT-LVL IV: ICD-10-PCS | Mod: PBBFAC,,, | Performed by: OPTOMETRIST

## 2022-08-23 PROCEDURE — 99999 PR PBB SHADOW E&M-EST. PATIENT-LVL IV: CPT | Mod: PBBFAC,,, | Performed by: OPTOMETRIST

## 2022-08-23 PROCEDURE — 99212 OFFICE O/P EST SF 10 MIN: CPT | Mod: PBBFAC,PO | Performed by: OPTOMETRIST

## 2022-08-23 PROCEDURE — 99999 PR PBB SHADOW E&M-EST. PATIENT-LVL II: CPT | Mod: PBBFAC,,, | Performed by: OPTOMETRIST

## 2022-08-23 NOTE — PROGRESS NOTES
Assessment /Plan     For exam results, see Encounter Report.    Myopia of both eyes with astigmatism and presbyopia      1. Distance only and near only specs for TV and newspaper, no magnifiers needed at this time. RTC yearly refraction.

## 2022-08-23 NOTE — PROGRESS NOTES
Unable to reach patient contact number listed for a (Susu Rosado). LVM message. Patient will be D/C from  program.

## 2022-08-25 ENCOUNTER — TELEPHONE (OUTPATIENT)
Dept: INTERNAL MEDICINE | Facility: CLINIC | Age: 87
End: 2022-08-25
Payer: MEDICARE

## 2022-08-25 NOTE — TELEPHONE ENCOUNTER
----- Message from Rupal Pierre sent at 8/25/2022  3:53 PM CDT -----  Contact: Gwen (nurse) [phone]  Gwen from [facility] nurse requesting a call for verbal orders to continue therapy.    Please call and advise

## 2022-08-30 ENCOUNTER — OFFICE VISIT (OUTPATIENT)
Dept: URGENT CARE | Facility: CLINIC | Age: 87
End: 2022-08-30
Payer: MEDICARE

## 2022-08-30 VITALS
BODY MASS INDEX: 23.73 KG/M2 | HEART RATE: 79 BPM | TEMPERATURE: 98 F | RESPIRATION RATE: 15 BRPM | HEIGHT: 64 IN | WEIGHT: 139 LBS | OXYGEN SATURATION: 96 % | SYSTOLIC BLOOD PRESSURE: 120 MMHG | DIASTOLIC BLOOD PRESSURE: 60 MMHG

## 2022-08-30 DIAGNOSIS — R09.81 NASAL CONGESTION: ICD-10-CM

## 2022-08-30 DIAGNOSIS — J44.1 COPD EXACERBATION: ICD-10-CM

## 2022-08-30 DIAGNOSIS — H61.21 IMPACTED CERUMEN OF RIGHT EAR: Primary | ICD-10-CM

## 2022-08-30 LAB
CTP QC/QA: YES
SARS-COV-2 RDRP RESP QL NAA+PROBE: NEGATIVE

## 2022-08-30 PROCEDURE — 99213 PR OFFICE/OUTPT VISIT, EST, LEVL III, 20-29 MIN: ICD-10-PCS | Mod: S$GLB,,,

## 2022-08-30 PROCEDURE — U0002: ICD-10-PCS | Mod: QW,CR,S$GLB,

## 2022-08-30 PROCEDURE — 99213 OFFICE O/P EST LOW 20 MIN: CPT | Mod: S$GLB,,,

## 2022-08-30 PROCEDURE — U0002 COVID-19 LAB TEST NON-CDC: HCPCS | Mod: QW,CR,S$GLB,

## 2022-08-30 RX ORDER — PREDNISONE 20 MG/1
20 TABLET ORAL DAILY
Qty: 7 TABLET | Refills: 0 | Status: SHIPPED | OUTPATIENT
Start: 2022-08-30 | End: 2022-08-30

## 2022-08-30 RX ORDER — PREDNISONE 20 MG/1
10 TABLET ORAL DAILY
Qty: 4 TABLET | Refills: 0 | Status: SHIPPED | OUTPATIENT
Start: 2022-08-30 | End: 2022-09-06

## 2022-08-30 NOTE — PATIENT INSTRUCTIONS
- Use nebulizer treatments every 4 hours or as needed for wheezing or SOB.   - Only take 20 mg of prednisone for a full 3 days and then go back down to 10 mg daily.   - Follow up with pulmonology.     - Rest.    - Drink plenty of fluids.      - You must understand that you have received an Urgent Care treatment only and that you may be released before all of your medical problems are known or treated.   - You, the patient, will arrange for follow up care as instructed.   - If your condition worsens or fails to improve we recommend that you receive another evaluation at the ER immediately or contact your PCP to discuss your concerns or return here.   - Follow up with your PCP or specialty clinic as directed in the next 1-2 weeks if not improved or as needed.  You can call [phone] to schedule an appointment with the appropriate provider.    If your symptoms do not improve or worsen, go to the emergency room immediately.

## 2022-08-30 NOTE — PROGRESS NOTES
"Subjective:       Patient ID: Selma Lux is a 86 y.o. female.    Vitals:  height is 5' 4" (1.626 m) and weight is 63 kg (139 lb). Her temperature is 98.1 °F (36.7 °C). Her blood pressure is 120/60 and her pulse is 79. Her respiration is 15 and oxygen saturation is 96%.     Chief Complaint: Shortness of Breath     Patient is an 86 year old female with an extensive hx including recent hemorrhagic stroke in June, and COPD who presents today for shortness of breath and wheezing that her daughter noticed 2 days ago. Daughter states that patient was recently released from the hospital earlier this month and was given 10 mg of prednisone to take daily for autoimmune disease. Patients daughter thinks symptoms due to the decrease in prednisone dosage since leaving the hospital. Patient has been taking 20 mg of prednisone since yesterday with improvement of symptoms. Patient is not currently feeling SOB. She denies fever, body aches or chills. She is having nasal congestion and ear pressure.     Shortness of Breath  This is a recurrent problem. The current episode started in the past 7 days. The problem has been gradually worsening. Associated symptoms include ear pain. Pertinent negatives include no abdominal pain, chest pain, claudication, coryza, fever, headaches, hemoptysis, leg pain, leg swelling, neck pain, rhinorrhea, sore throat, sputum production, swollen glands, syncope, vomiting or wheezing. Nothing aggravates the symptoms. She has tried nothing for the symptoms.     Constitution: Negative for fever.   HENT:  Positive for ear pain, hearing loss and congestion. Negative for sinus pain, sinus pressure and sore throat.    Neck: Negative for neck pain.   Cardiovascular:  Negative for chest pain, leg swelling and passing out.   Eyes:  Negative for eye pain and eye redness.   Respiratory:  Positive for shortness of breath. Negative for sputum production, bloody sputum and wheezing.    Gastrointestinal:  " Negative for abdominal pain and vomiting.   Neurological:  Negative for headaches.     Objective:      Physical Exam   Constitutional: She is oriented to person, place, and time. She appears well-developed. She is cooperative.  Non-toxic appearance. She does not appear ill. No distress.   HENT:   Head: Normocephalic and atraumatic.   Ears:   Right Ear: Hearing, tympanic membrane, external ear and ear canal normal. There is cerumen present.   Left Ear: Hearing, tympanic membrane, external ear and ear canal normal.   Nose: Congestion present. No mucosal edema, rhinorrhea or nasal deformity. No epistaxis. Right sinus exhibits no maxillary sinus tenderness and no frontal sinus tenderness. Left sinus exhibits no maxillary sinus tenderness and no frontal sinus tenderness.   Mouth/Throat: Uvula is midline, oropharynx is clear and moist and mucous membranes are normal. No trismus in the jaw. Normal dentition. No uvula swelling. No oropharyngeal exudate, posterior oropharyngeal edema or posterior oropharyngeal erythema.   Cerumen impaction successfully removed.       Comments: Cerumen impaction successfully removed.   Eyes: Conjunctivae and lids are normal. No scleral icterus.   Neck: Trachea normal and phonation normal. Neck supple. No edema present. No erythema present. No neck rigidity present.   Cardiovascular: Normal rate, regular rhythm, normal heart sounds and normal pulses.   Pulmonary/Chest: Effort normal and breath sounds normal. No stridor. No respiratory distress. She has no decreased breath sounds. She has no wheezes. She has no rhonchi. She has no rales.   Abdominal: Normal appearance.   Musculoskeletal: Normal range of motion.         General: No deformity. Normal range of motion.   Lymphadenopathy:     She has no cervical adenopathy.        Right cervical: No superficial cervical, no deep cervical and no posterior cervical adenopathy present.       Left cervical: No superficial cervical, no deep cervical and  no posterior cervical adenopathy present.   Neurological: She is alert and oriented to person, place, and time. She exhibits normal muscle tone. Coordination normal.   Skin: Skin is warm, dry, intact, not diaphoretic and not pale.   Psychiatric: Her speech is normal and behavior is normal. Judgment and thought content normal.   Nursing note and vitals reviewed.      Results for orders placed or performed in visit on 08/30/22   POCT COVID-19 Rapid Screening   Result Value Ref Range    POC Rapid COVID Negative Negative     Acceptable Yes      *Note: Due to a large number of results and/or encounters for the requested time period, some results have not been displayed. A complete set of results can be found in Results Review.       Assessment:       1. Impacted cerumen of right ear    2. Nasal congestion    3. COPD exacerbation          Plan:       Advise that patient only take 20 mg of prednisone for 3 full days since she has had relief with that and then go back down to 10 mg. Patient was given duo-neb ampoules from [facility] with no nebulizer machine. Nebulizer machine prescribed via prescription pad. Advise patient follow up with pulmonologist. Patient and her daughter expressed understanding and agree with plan.     Impacted cerumen of right ear  -     Ear wax removal    Nasal congestion  -     POCT COVID-19 Rapid Screening    COPD exacerbation  -     Discontinue: predniSONE (DELTASONE) 20 MG tablet; Take 1 tablet (20 mg total) by mouth once daily. for 7 days  Dispense: 7 tablet; Refill: 0  -     predniSONE (DELTASONE) 20 MG tablet; Take 0.5 tablets (10 mg total) by mouth once daily. for 7 days  Dispense: 4 tablet; Refill: 0               Patient Instructions   - Use nebulizer treatments every 4 hours or as needed for wheezing or SOB.   - Only take 20 mg of prednisone for a full 3 days and then go back down to 10 mg daily.   - Follow up with pulmonology.     - Rest.    - Drink plenty of fluids.      -  You must understand that you have received an Urgent Care treatment only and that you may be released before all of your medical problems are known or treated.   - You, the patient, will arrange for follow up care as instructed.   - If your condition worsens or fails to improve we recommend that you receive another evaluation at the ER immediately or contact your PCP to discuss your concerns or return here.   - Follow up with your PCP or specialty clinic as directed in the next 1-2 weeks if not improved or as needed.  You can call [phone] to schedule an appointment with the appropriate provider.    If your symptoms do not improve or worsen, go to the emergency room immediately.

## 2022-08-31 ENCOUNTER — EXTERNAL CHRONIC CARE MANAGEMENT (OUTPATIENT)
Dept: PRIMARY CARE CLINIC | Facility: CLINIC | Age: 87
End: 2022-08-31
Payer: MEDICARE

## 2022-08-31 PROCEDURE — 99490 PR CHRONIC CARE MGMT, 1ST 20 MIN: ICD-10-PCS | Mod: S$PBB,,, | Performed by: FAMILY MEDICINE

## 2022-08-31 PROCEDURE — 99490 CHRNC CARE MGMT STAFF 1ST 20: CPT | Mod: PBBFAC | Performed by: FAMILY MEDICINE

## 2022-08-31 PROCEDURE — 99490 CHRNC CARE MGMT STAFF 1ST 20: CPT | Mod: S$PBB,,, | Performed by: FAMILY MEDICINE

## 2022-09-02 ENCOUNTER — TELEPHONE (OUTPATIENT)
Dept: INTERNAL MEDICINE | Facility: CLINIC | Age: 87
End: 2022-09-02
Payer: MEDICARE

## 2022-09-02 ENCOUNTER — PATIENT MESSAGE (OUTPATIENT)
Dept: NEUROLOGY | Facility: CLINIC | Age: 87
End: 2022-09-02
Payer: MEDICARE

## 2022-09-02 DIAGNOSIS — I69.154 HEMIPLEGIA AND HEMIPARESIS FOLLOWING NONTRAUMATIC INTRACEREBRAL HEMORRHAGE AFFECTING LEFT NON-DOMINANT SIDE: ICD-10-CM

## 2022-09-02 DIAGNOSIS — G43.719 INTRACTABLE CHRONIC MIGRAINE WITHOUT AURA AND WITHOUT STATUS MIGRAINOSUS: Primary | ICD-10-CM

## 2022-09-02 NOTE — TELEPHONE ENCOUNTER
----- Message from Bhargav Lee MD sent at 9/1/2022  8:48 PM CDT -----  Contact: Gwen (nurse) [phone]  Verbal order to continue physical therapy for 30 more days, thank you  ----- Message -----  From: Mabel Elkins MA  Sent: 8/30/2022  10:36 AM CDT  To: Bhargav Lee MD      ----- Message -----  From: Nasima Mccoy  Sent: 8/30/2022  10:33 AM CDT  To: Rosa MONTES Staff    Contact: Gwen (nurse) [phone]  Gwen from [facility] nurse requesting a call for verbal orders to continue therapy.     Please call and advise

## 2022-09-02 NOTE — TELEPHONE ENCOUNTER
I returned a call to Gwen ([facility] nurse) [phone], requesting a call for verbal orders to continue therapy. She did not answer, left verbal approval on vm, and asked that she return a call to W to confirm.

## 2022-09-02 NOTE — TELEPHONE ENCOUNTER
----- Message from Bess Hare sent at 9/2/2022  1:55 PM CDT -----  Contact: Dov/TEMO  Called to notify that she accepted the orders that was left on v/m    Thank you

## 2022-09-11 ENCOUNTER — PATIENT MESSAGE (OUTPATIENT)
Dept: NEUROLOGY | Facility: CLINIC | Age: 87
End: 2022-09-11
Payer: MEDICARE

## 2022-09-17 ENCOUNTER — DOCUMENT SCAN (OUTPATIENT)
Dept: HOME HEALTH SERVICES | Facility: HOSPITAL | Age: 87
End: 2022-09-17
Payer: MEDICARE

## 2022-09-20 ENCOUNTER — PATIENT MESSAGE (OUTPATIENT)
Dept: PAIN MEDICINE | Facility: CLINIC | Age: 87
End: 2022-09-20
Payer: MEDICARE

## 2022-09-21 NOTE — TELEPHONE ENCOUNTER
Pt had a stroke 7.19.22 and was seen by Dr. Giang, in movement, who advised her to f/u with a stroke specialist. Pt's daughter requested the soonest appointment possible. She is scheduled with Nadja Valdez NP on 9.27.22. Pt verbalized understanding.

## 2022-09-30 ENCOUNTER — EXTERNAL CHRONIC CARE MANAGEMENT (OUTPATIENT)
Dept: PRIMARY CARE CLINIC | Facility: CLINIC | Age: 87
End: 2022-09-30
Payer: MEDICARE

## 2022-09-30 PROCEDURE — 99490 PR CHRONIC CARE MGMT, 1ST 20 MIN: ICD-10-PCS | Mod: S$PBB,,, | Performed by: FAMILY MEDICINE

## 2022-09-30 PROCEDURE — 99490 CHRNC CARE MGMT STAFF 1ST 20: CPT | Mod: S$PBB,,, | Performed by: FAMILY MEDICINE

## 2022-09-30 PROCEDURE — 99490 CHRNC CARE MGMT STAFF 1ST 20: CPT | Mod: PBBFAC | Performed by: FAMILY MEDICINE

## 2022-10-05 ENCOUNTER — TELEPHONE (OUTPATIENT)
Dept: NEUROLOGY | Facility: CLINIC | Age: 87
End: 2022-10-05
Payer: MEDICARE

## 2022-10-05 ENCOUNTER — PATIENT MESSAGE (OUTPATIENT)
Dept: CARDIOLOGY | Facility: CLINIC | Age: 87
End: 2022-10-05
Payer: MEDICARE

## 2022-10-05 ENCOUNTER — PATIENT MESSAGE (OUTPATIENT)
Dept: PAIN MEDICINE | Facility: CLINIC | Age: 87
End: 2022-10-05
Payer: MEDICARE

## 2022-10-05 ENCOUNTER — PATIENT MESSAGE (OUTPATIENT)
Dept: NEUROLOGY | Facility: CLINIC | Age: 87
End: 2022-10-05
Payer: MEDICARE

## 2022-10-05 NOTE — TELEPHONE ENCOUNTER
Spoke with Susu to confirm she and Dr. Lux had received word of new appointment with [facility]. We confirmed 10/19 at 10AM.

## 2022-10-11 ENCOUNTER — HOSPITAL ENCOUNTER (OUTPATIENT)
Facility: HOSPITAL | Age: 87
Discharge: HOME | End: 2022-10-13
Attending: EMERGENCY MEDICINE | Admitting: HOSPITALIST
Payer: MEDICARE

## 2022-10-11 DIAGNOSIS — G93.40 ACUTE ENCEPHALOPATHY: Primary | ICD-10-CM

## 2022-10-11 DIAGNOSIS — R07.9 CHEST PAIN: ICD-10-CM

## 2022-10-11 DIAGNOSIS — R53.81 MALAISE: ICD-10-CM

## 2022-10-11 DIAGNOSIS — S06.5XAA SDH (SUBDURAL HEMATOMA): ICD-10-CM

## 2022-10-11 DIAGNOSIS — R06.02 SHORTNESS OF BREATH: ICD-10-CM

## 2022-10-11 LAB
ALBUMIN SERPL BCP-MCNC: 3.1 G/DL (ref 3.5–5.2)
ALLENS TEST: ABNORMAL
ALP SERPL-CCNC: 59 U/L (ref 55–135)
ALT SERPL W/O P-5'-P-CCNC: 7 U/L (ref 10–44)
ANION GAP SERPL CALC-SCNC: 9 MMOL/L (ref 8–16)
ANISOCYTOSIS BLD QL SMEAR: SLIGHT
AST SERPL-CCNC: 10 U/L (ref 10–40)
BACTERIA #/AREA URNS AUTO: ABNORMAL /HPF
BASOPHILS # BLD AUTO: 0.01 K/UL (ref 0–0.2)
BASOPHILS NFR BLD: 0.1 % (ref 0–1.9)
BILIRUB SERPL-MCNC: 1.8 MG/DL (ref 0.1–1)
BILIRUB UR QL STRIP: NEGATIVE
BNP SERPL-MCNC: 113 PG/ML (ref 0–99)
BUN SERPL-MCNC: 15 MG/DL (ref 8–23)
CALCIUM SERPL-MCNC: 8 MG/DL (ref 8.7–10.5)
CHLORIDE SERPL-SCNC: 102 MMOL/L (ref 95–110)
CLARITY UR REFRACT.AUTO: CLEAR
CO2 SERPL-SCNC: 21 MMOL/L (ref 23–29)
COLOR UR AUTO: YELLOW
CREAT SERPL-MCNC: 0.8 MG/DL (ref 0.5–1.4)
DACRYOCYTES BLD QL SMEAR: ABNORMAL
DIFFERENTIAL METHOD: ABNORMAL
EOSINOPHIL # BLD AUTO: 0.1 K/UL (ref 0–0.5)
EOSINOPHIL NFR BLD: 0.6 % (ref 0–8)
ERYTHROCYTE [DISTWIDTH] IN BLOOD BY AUTOMATED COUNT: 18.5 % (ref 11.5–14.5)
EST. GFR  (NO RACE VARIABLE): >60 ML/MIN/1.73 M^2
GLUCOSE SERPL-MCNC: 74 MG/DL (ref 70–110)
GLUCOSE UR QL STRIP: NEGATIVE
HCO3 UR-SCNC: 25.3 MMOL/L (ref 24–28)
HCT VFR BLD AUTO: 32.4 % (ref 37–48.5)
HGB BLD-MCNC: 9.9 G/DL (ref 12–16)
HGB UR QL STRIP: NEGATIVE
HYALINE CASTS UR QL AUTO: 0 /LPF
HYPOCHROMIA BLD QL SMEAR: ABNORMAL
IMM GRANULOCYTES # BLD AUTO: 0.1 K/UL (ref 0–0.04)
IMM GRANULOCYTES NFR BLD AUTO: 0.9 % (ref 0–0.5)
INFLUENZA A, MOLECULAR: NEGATIVE
INFLUENZA B, MOLECULAR: NEGATIVE
KETONES UR QL STRIP: ABNORMAL
LACTATE SERPL-SCNC: 0.7 MMOL/L (ref 0.5–2.2)
LACTATE SERPL-SCNC: 0.7 MMOL/L (ref 0.5–2.2)
LEUKOCYTE ESTERASE UR QL STRIP: ABNORMAL
LYMPHOCYTES # BLD AUTO: 0.7 K/UL (ref 1–4.8)
LYMPHOCYTES NFR BLD: 6.6 % (ref 18–48)
MCH RBC QN AUTO: 24.8 PG (ref 27–31)
MCHC RBC AUTO-ENTMCNC: 30.6 G/DL (ref 32–36)
MCV RBC AUTO: 81 FL (ref 82–98)
MICROSCOPIC COMMENT: ABNORMAL
MONOCYTES # BLD AUTO: 2.8 K/UL (ref 0.3–1)
MONOCYTES NFR BLD: 26 % (ref 4–15)
NEUTROPHILS # BLD AUTO: 7.1 K/UL (ref 1.8–7.7)
NEUTROPHILS NFR BLD: 65.8 % (ref 38–73)
NITRITE UR QL STRIP: NEGATIVE
NRBC BLD-RTO: 0 /100 WBC
OVALOCYTES BLD QL SMEAR: ABNORMAL
PCO2 BLDA: 40.1 MMHG (ref 35–45)
PH SMN: 7.41 [PH] (ref 7.35–7.45)
PH UR STRIP: 7 [PH] (ref 5–8)
PLATELET # BLD AUTO: 84 K/UL (ref 150–450)
PLATELET BLD QL SMEAR: ABNORMAL
PMV BLD AUTO: ABNORMAL FL (ref 9.2–12.9)
PO2 BLDA: 36 MMHG (ref 40–60)
POC BE: 1 MMOL/L
POC SATURATED O2: 70 % (ref 95–100)
POC TCO2: 26 MMOL/L (ref 24–29)
POIKILOCYTOSIS BLD QL SMEAR: SLIGHT
POLYCHROMASIA BLD QL SMEAR: ABNORMAL
POTASSIUM SERPL-SCNC: 4.3 MMOL/L (ref 3.5–5.1)
PROT SERPL-MCNC: 5.5 G/DL (ref 6–8.4)
PROT UR QL STRIP: ABNORMAL
RBC # BLD AUTO: 4 M/UL (ref 4–5.4)
RBC #/AREA URNS AUTO: 4 /HPF (ref 0–4)
SAMPLE: ABNORMAL
SARS-COV-2 RDRP RESP QL NAA+PROBE: NEGATIVE
SCHISTOCYTES BLD QL SMEAR: ABNORMAL
SITE: ABNORMAL
SODIUM SERPL-SCNC: 132 MMOL/L (ref 136–145)
SP GR UR STRIP: 1.01 (ref 1–1.03)
SPECIMEN SOURCE: NORMAL
SQUAMOUS #/AREA URNS AUTO: 5 /HPF
TARGETS BLD QL SMEAR: ABNORMAL
TROPONIN I SERPL DL<=0.01 NG/ML-MCNC: 0.02 NG/ML (ref 0–0.03)
TSH SERPL DL<=0.005 MIU/L-ACNC: 0.57 UIU/ML (ref 0.4–4)
URN SPEC COLLECT METH UR: ABNORMAL
WBC # BLD AUTO: 10.8 K/UL (ref 3.9–12.7)
WBC #/AREA URNS AUTO: 14 /HPF (ref 0–5)

## 2022-10-11 PROCEDURE — 99285 EMERGENCY DEPT VISIT HI MDM: CPT | Mod: CS,,, | Performed by: EMERGENCY MEDICINE

## 2022-10-11 PROCEDURE — U0002 COVID-19 LAB TEST NON-CDC: HCPCS | Performed by: EMERGENCY MEDICINE

## 2022-10-11 PROCEDURE — 87086 URINE CULTURE/COLONY COUNT: CPT | Performed by: EMERGENCY MEDICINE

## 2022-10-11 PROCEDURE — 80053 COMPREHEN METABOLIC PANEL: CPT | Performed by: EMERGENCY MEDICINE

## 2022-10-11 PROCEDURE — 99220 PR INITIAL OBSERVATION CARE,LEVL III: ICD-10-PCS | Mod: ,,,

## 2022-10-11 PROCEDURE — G0378 HOSPITAL OBSERVATION PER HR: HCPCS

## 2022-10-11 PROCEDURE — 87502 INFLUENZA DNA AMP PROBE: CPT | Performed by: EMERGENCY MEDICINE

## 2022-10-11 PROCEDURE — 87040 BLOOD CULTURE FOR BACTERIA: CPT | Mod: 59 | Performed by: EMERGENCY MEDICINE

## 2022-10-11 PROCEDURE — 85025 COMPLETE CBC W/AUTO DIFF WBC: CPT | Performed by: EMERGENCY MEDICINE

## 2022-10-11 PROCEDURE — 99900035 HC TECH TIME PER 15 MIN (STAT)

## 2022-10-11 PROCEDURE — 99220 PR INITIAL OBSERVATION CARE,LEVL III: CPT | Mod: ,,,

## 2022-10-11 PROCEDURE — 84443 ASSAY THYROID STIM HORMONE: CPT | Performed by: EMERGENCY MEDICINE

## 2022-10-11 PROCEDURE — 93010 ELECTROCARDIOGRAM REPORT: CPT | Mod: ,,, | Performed by: INTERNAL MEDICINE

## 2022-10-11 PROCEDURE — 84484 ASSAY OF TROPONIN QUANT: CPT | Performed by: EMERGENCY MEDICINE

## 2022-10-11 PROCEDURE — 99285 EMERGENCY DEPT VISIT HI MDM: CPT | Mod: 25

## 2022-10-11 PROCEDURE — 81001 URINALYSIS AUTO W/SCOPE: CPT | Performed by: EMERGENCY MEDICINE

## 2022-10-11 PROCEDURE — 82800 BLOOD PH: CPT | Mod: 59

## 2022-10-11 PROCEDURE — 82803 BLOOD GASES ANY COMBINATION: CPT

## 2022-10-11 PROCEDURE — 25500020 PHARM REV CODE 255: Performed by: EMERGENCY MEDICINE

## 2022-10-11 PROCEDURE — 83605 ASSAY OF LACTIC ACID: CPT | Performed by: EMERGENCY MEDICINE

## 2022-10-11 PROCEDURE — 93010 EKG 12-LEAD: ICD-10-PCS | Mod: ,,, | Performed by: INTERNAL MEDICINE

## 2022-10-11 PROCEDURE — 83880 ASSAY OF NATRIURETIC PEPTIDE: CPT | Performed by: EMERGENCY MEDICINE

## 2022-10-11 PROCEDURE — 83605 ASSAY OF LACTIC ACID: CPT | Mod: 91 | Performed by: EMERGENCY MEDICINE

## 2022-10-11 PROCEDURE — 93005 ELECTROCARDIOGRAM TRACING: CPT

## 2022-10-11 PROCEDURE — 99285 PR EMERGENCY DEPT VISIT,LEVEL V: ICD-10-PCS | Mod: CS,,, | Performed by: EMERGENCY MEDICINE

## 2022-10-11 RX ADMIN — IOHEXOL 75 ML: 350 INJECTION, SOLUTION INTRAVENOUS at 09:10

## 2022-10-11 NOTE — ED NOTES
Bed: [facility] 04  Expected date:   Expected time:   Means of arrival:   Comments:

## 2022-10-11 NOTE — ED TRIAGE NOTES
Patient presents to the ER via EMS after possibly exposed to COVID at [employer] on Sunday. Endorses fatigue. Hx of persistent PNA. Endorses intermittent SOB. This morning patient was unable to ambulate without assistance. Pt AAOx4. Family states she has been out of her eliquis.

## 2022-10-11 NOTE — FIRST PROVIDER EVALUATION
Medical screening examination initiated.  I have conducted a focused provider triage encounter, findings are as follows:    Brief history of present illness:  reports covid exposure at [employer], increased tired and is falling asleep frequently. Had recent hospital admission for pneumonia.     There were no vitals filed for this visit.    Pertinent physical exam:  ill appearing, not hypoxia    Brief workup plan:  labs, swabs ordered     Preliminary workup initiated; this workup will be continued and followed by the physician or advanced practice provider that is assigned to the patient when roomed.

## 2022-10-12 PROBLEM — K52.9 COLITIS: Status: ACTIVE | Noted: 2022-10-12

## 2022-10-12 PROBLEM — I50.22 CHRONIC SYSTOLIC (CONGESTIVE) HEART FAILURE: Status: RESOLVED | Noted: 2021-08-06 | Resolved: 2022-10-12

## 2022-10-12 LAB
ANION GAP SERPL CALC-SCNC: 8 MMOL/L (ref 8–16)
BASOPHILS # BLD AUTO: 0.01 K/UL (ref 0–0.2)
BASOPHILS NFR BLD: 0.1 % (ref 0–1.9)
BUN SERPL-MCNC: 19 MG/DL (ref 8–23)
BURR CELLS BLD QL SMEAR: ABNORMAL
CALCIUM SERPL-MCNC: 7.5 MG/DL (ref 8.7–10.5)
CHLORIDE SERPL-SCNC: 105 MMOL/L (ref 95–110)
CO2 SERPL-SCNC: 21 MMOL/L (ref 23–29)
CREAT SERPL-MCNC: 0.8 MG/DL (ref 0.5–1.4)
DIFFERENTIAL METHOD: ABNORMAL
EOSINOPHIL # BLD AUTO: 0.1 K/UL (ref 0–0.5)
EOSINOPHIL NFR BLD: 0.8 % (ref 0–8)
ERYTHROCYTE [DISTWIDTH] IN BLOOD BY AUTOMATED COUNT: 18.1 % (ref 11.5–14.5)
EST. GFR  (NO RACE VARIABLE): >60 ML/MIN/1.73 M^2
GLUCOSE SERPL-MCNC: 99 MG/DL (ref 70–110)
HCT VFR BLD AUTO: 27.7 % (ref 37–48.5)
HGB BLD-MCNC: 8.2 G/DL (ref 12–16)
HYPOCHROMIA BLD QL SMEAR: ABNORMAL
IMM GRANULOCYTES # BLD AUTO: 0.11 K/UL (ref 0–0.04)
IMM GRANULOCYTES NFR BLD AUTO: 1 % (ref 0–0.5)
LYMPHOCYTES # BLD AUTO: 1.5 K/UL (ref 1–4.8)
LYMPHOCYTES NFR BLD: 13.7 % (ref 18–48)
MAGNESIUM SERPL-MCNC: 1.7 MG/DL (ref 1.6–2.6)
MCH RBC QN AUTO: 24.4 PG (ref 27–31)
MCHC RBC AUTO-ENTMCNC: 29.6 G/DL (ref 32–36)
MCV RBC AUTO: 82 FL (ref 82–98)
MONOCYTES # BLD AUTO: 3.7 K/UL (ref 0.3–1)
MONOCYTES NFR BLD: 34.7 % (ref 4–15)
NEUTROPHILS # BLD AUTO: 5.3 K/UL (ref 1.8–7.7)
NEUTROPHILS NFR BLD: 49.7 % (ref 38–73)
NRBC BLD-RTO: 0 /100 WBC
OVALOCYTES BLD QL SMEAR: ABNORMAL
PLATELET # BLD AUTO: 76 K/UL (ref 150–450)
PMV BLD AUTO: ABNORMAL FL (ref 9.2–12.9)
POIKILOCYTOSIS BLD QL SMEAR: SLIGHT
POLYCHROMASIA BLD QL SMEAR: ABNORMAL
POTASSIUM SERPL-SCNC: 3.9 MMOL/L (ref 3.5–5.1)
RBC # BLD AUTO: 3.36 M/UL (ref 4–5.4)
SCHISTOCYTES BLD QL SMEAR: ABNORMAL
SODIUM SERPL-SCNC: 134 MMOL/L (ref 136–145)
WBC # BLD AUTO: 10.7 K/UL (ref 3.9–12.7)

## 2022-10-12 PROCEDURE — 94761 N-INVAS EAR/PLS OXIMETRY MLT: CPT

## 2022-10-12 PROCEDURE — 63600175 PHARM REV CODE 636 W HCPCS: Performed by: EMERGENCY MEDICINE

## 2022-10-12 PROCEDURE — G0378 HOSPITAL OBSERVATION PER HR: HCPCS

## 2022-10-12 PROCEDURE — 99226 PR SUBSEQUENT OBSERVATION CARE,LEVEL III: CPT | Mod: ,,,

## 2022-10-12 PROCEDURE — 80048 BASIC METABOLIC PNL TOTAL CA: CPT

## 2022-10-12 PROCEDURE — 25000242 PHARM REV CODE 250 ALT 637 W/ HCPCS

## 2022-10-12 PROCEDURE — 99226 PR SUBSEQUENT OBSERVATION CARE,LEVEL III: ICD-10-PCS | Mod: ,,,

## 2022-10-12 PROCEDURE — 96365 THER/PROPH/DIAG IV INF INIT: CPT

## 2022-10-12 PROCEDURE — 85025 COMPLETE CBC W/AUTO DIFF WBC: CPT

## 2022-10-12 PROCEDURE — 94640 AIRWAY INHALATION TREATMENT: CPT

## 2022-10-12 PROCEDURE — 25000003 PHARM REV CODE 250

## 2022-10-12 PROCEDURE — 96366 THER/PROPH/DIAG IV INF ADDON: CPT

## 2022-10-12 PROCEDURE — 63600175 PHARM REV CODE 636 W HCPCS

## 2022-10-12 PROCEDURE — 83735 ASSAY OF MAGNESIUM: CPT

## 2022-10-12 PROCEDURE — 96361 HYDRATE IV INFUSION ADD-ON: CPT

## 2022-10-12 PROCEDURE — 96367 TX/PROPH/DG ADDL SEQ IV INF: CPT

## 2022-10-12 RX ORDER — CARVEDILOL 3.12 MG/1
3.12 TABLET ORAL 2 TIMES DAILY
COMMUNITY
Start: 2022-09-26 | End: 2022-11-15 | Stop reason: SDUPTHER

## 2022-10-12 RX ORDER — GLUCAGON 1 MG
1 KIT INJECTION
Status: DISCONTINUED | OUTPATIENT
Start: 2022-10-12 | End: 2022-10-13 | Stop reason: HOSPADM

## 2022-10-12 RX ORDER — PANTOPRAZOLE SODIUM 20 MG/1
20 TABLET, DELAYED RELEASE ORAL DAILY
Status: DISCONTINUED | OUTPATIENT
Start: 2022-10-12 | End: 2022-10-13 | Stop reason: HOSPADM

## 2022-10-12 RX ORDER — GABAPENTIN 300 MG/1
300 CAPSULE ORAL NIGHTLY
Status: DISCONTINUED | OUTPATIENT
Start: 2022-10-12 | End: 2022-10-13 | Stop reason: HOSPADM

## 2022-10-12 RX ORDER — SIMETHICONE 80 MG
80 TABLET,CHEWABLE ORAL 3 TIMES DAILY PRN
Status: DISCONTINUED | OUTPATIENT
Start: 2022-10-12 | End: 2022-10-13 | Stop reason: HOSPADM

## 2022-10-12 RX ORDER — DICLOFENAC SODIUM 10 MG/G
2 GEL TOPICAL 2 TIMES DAILY
Status: DISCONTINUED | OUTPATIENT
Start: 2022-10-12 | End: 2022-10-13 | Stop reason: HOSPADM

## 2022-10-12 RX ORDER — MAG HYDROX/ALUMINUM HYD/SIMETH 200-200-20
30 SUSPENSION, ORAL (FINAL DOSE FORM) ORAL 4 TIMES DAILY PRN
Status: DISCONTINUED | OUTPATIENT
Start: 2022-10-12 | End: 2022-10-13 | Stop reason: HOSPADM

## 2022-10-12 RX ORDER — ACETAMINOPHEN 325 MG/1
650 TABLET ORAL EVERY 6 HOURS PRN
Status: DISCONTINUED | OUTPATIENT
Start: 2022-10-12 | End: 2022-10-13 | Stop reason: HOSPADM

## 2022-10-12 RX ORDER — TALC
9 POWDER (GRAM) TOPICAL NIGHTLY PRN
Status: DISCONTINUED | OUTPATIENT
Start: 2022-10-12 | End: 2022-10-13 | Stop reason: HOSPADM

## 2022-10-12 RX ORDER — AMLODIPINE BESYLATE 10 MG/1
10 TABLET ORAL DAILY
Status: DISCONTINUED | OUTPATIENT
Start: 2022-10-12 | End: 2022-10-12

## 2022-10-12 RX ORDER — GABAPENTIN 100 MG/1
100 CAPSULE ORAL 2 TIMES DAILY WITH MEALS
Status: DISCONTINUED | OUTPATIENT
Start: 2022-10-12 | End: 2022-10-13 | Stop reason: HOSPADM

## 2022-10-12 RX ORDER — ONDANSETRON 8 MG/1
8 TABLET, ORALLY DISINTEGRATING ORAL EVERY 8 HOURS PRN
Status: DISCONTINUED | OUTPATIENT
Start: 2022-10-12 | End: 2022-10-13 | Stop reason: HOSPADM

## 2022-10-12 RX ORDER — NALOXONE HCL 0.4 MG/ML
0.02 VIAL (ML) INJECTION
Status: DISCONTINUED | OUTPATIENT
Start: 2022-10-12 | End: 2022-10-13 | Stop reason: HOSPADM

## 2022-10-12 RX ORDER — LANOLIN ALCOHOL/MO/W.PET/CERES
1 CREAM (GRAM) TOPICAL DAILY
Status: DISCONTINUED | OUTPATIENT
Start: 2022-10-12 | End: 2022-10-13 | Stop reason: HOSPADM

## 2022-10-12 RX ORDER — PREDNISONE 10 MG/1
10 TABLET ORAL DAILY
Status: DISCONTINUED | OUTPATIENT
Start: 2022-10-12 | End: 2022-10-13 | Stop reason: HOSPADM

## 2022-10-12 RX ORDER — BACLOFEN 5 MG/1
5 TABLET ORAL 3 TIMES DAILY
Status: DISCONTINUED | OUTPATIENT
Start: 2022-10-12 | End: 2022-10-13 | Stop reason: HOSPADM

## 2022-10-12 RX ORDER — IBUPROFEN 200 MG
24 TABLET ORAL
Status: DISCONTINUED | OUTPATIENT
Start: 2022-10-12 | End: 2022-10-13 | Stop reason: HOSPADM

## 2022-10-12 RX ORDER — MONTELUKAST SODIUM 10 MG/1
10 TABLET ORAL DAILY
Status: DISCONTINUED | OUTPATIENT
Start: 2022-10-12 | End: 2022-10-13 | Stop reason: HOSPADM

## 2022-10-12 RX ORDER — BISACODYL 10 MG
10 SUPPOSITORY, RECTAL RECTAL DAILY PRN
Status: DISCONTINUED | OUTPATIENT
Start: 2022-10-12 | End: 2022-10-13 | Stop reason: HOSPADM

## 2022-10-12 RX ORDER — ATORVASTATIN CALCIUM 40 MG/1
40 TABLET, FILM COATED ORAL DAILY
Status: DISCONTINUED | OUTPATIENT
Start: 2022-10-12 | End: 2022-10-13 | Stop reason: HOSPADM

## 2022-10-12 RX ORDER — METRONIDAZOLE 500 MG/1
500 TABLET ORAL EVERY 8 HOURS
Status: DISCONTINUED | OUTPATIENT
Start: 2022-10-12 | End: 2022-10-13

## 2022-10-12 RX ORDER — TRAZODONE HYDROCHLORIDE 50 MG/1
50 TABLET ORAL NIGHTLY
COMMUNITY
Start: 2022-09-28 | End: 2023-01-30 | Stop reason: SDUPTHER

## 2022-10-12 RX ORDER — AMOXICILLIN 250 MG
2 CAPSULE ORAL 2 TIMES DAILY
Status: DISCONTINUED | OUTPATIENT
Start: 2022-10-12 | End: 2022-10-13 | Stop reason: HOSPADM

## 2022-10-12 RX ORDER — THIAMINE HCL 100 MG
100 TABLET ORAL DAILY
Status: DISCONTINUED | OUTPATIENT
Start: 2022-10-12 | End: 2022-10-13 | Stop reason: HOSPADM

## 2022-10-12 RX ORDER — TRAMADOL HYDROCHLORIDE 50 MG/1
50 TABLET ORAL EVERY 6 HOURS PRN
Status: DISCONTINUED | OUTPATIENT
Start: 2022-10-12 | End: 2022-10-13 | Stop reason: HOSPADM

## 2022-10-12 RX ORDER — CIPROFLOXACIN 2 MG/ML
400 INJECTION, SOLUTION INTRAVENOUS
Status: DISCONTINUED | OUTPATIENT
Start: 2022-10-12 | End: 2022-10-12

## 2022-10-12 RX ORDER — CHOLECALCIFEROL (VITAMIN D3) 25 MCG
2000 TABLET ORAL DAILY
Status: DISCONTINUED | OUTPATIENT
Start: 2022-10-12 | End: 2022-10-13 | Stop reason: HOSPADM

## 2022-10-12 RX ORDER — LANOLIN ALCOHOL/MO/W.PET/CERES
400 CREAM (GRAM) TOPICAL DAILY
Status: DISCONTINUED | OUTPATIENT
Start: 2022-10-12 | End: 2022-10-13 | Stop reason: HOSPADM

## 2022-10-12 RX ORDER — IPRATROPIUM BROMIDE AND ALBUTEROL SULFATE 2.5; .5 MG/3ML; MG/3ML
3 SOLUTION RESPIRATORY (INHALATION) 2 TIMES DAILY PRN
COMMUNITY
End: 2023-01-01 | Stop reason: SDUPTHER

## 2022-10-12 RX ORDER — SODIUM CHLORIDE 0.9 % (FLUSH) 0.9 %
10 SYRINGE (ML) INJECTION EVERY 8 HOURS PRN
Status: DISCONTINUED | OUTPATIENT
Start: 2022-10-12 | End: 2022-10-13 | Stop reason: HOSPADM

## 2022-10-12 RX ORDER — FUROSEMIDE 40 MG/1
40 TABLET ORAL EVERY OTHER DAY
Status: DISCONTINUED | OUTPATIENT
Start: 2022-10-13 | End: 2022-10-13 | Stop reason: HOSPADM

## 2022-10-12 RX ORDER — IPRATROPIUM BROMIDE AND ALBUTEROL SULFATE 2.5; .5 MG/3ML; MG/3ML
3 SOLUTION RESPIRATORY (INHALATION) EVERY 4 HOURS PRN
Status: DISCONTINUED | OUTPATIENT
Start: 2022-10-12 | End: 2022-10-13 | Stop reason: HOSPADM

## 2022-10-12 RX ORDER — PANTOPRAZOLE SODIUM 40 MG/1
40 TABLET, DELAYED RELEASE ORAL DAILY
COMMUNITY
Start: 2022-10-10 | End: 2023-01-30 | Stop reason: SDUPTHER

## 2022-10-12 RX ORDER — MYCOPHENOLATE MOFETIL 250 MG/1
500 CAPSULE ORAL 2 TIMES DAILY
Status: DISCONTINUED | OUTPATIENT
Start: 2022-10-12 | End: 2022-10-13 | Stop reason: HOSPADM

## 2022-10-12 RX ORDER — IBUPROFEN 200 MG
16 TABLET ORAL
Status: DISCONTINUED | OUTPATIENT
Start: 2022-10-12 | End: 2022-10-13 | Stop reason: HOSPADM

## 2022-10-12 RX ADMIN — MONTELUKAST 10 MG: 10 TABLET, FILM COATED ORAL at 09:10

## 2022-10-12 RX ADMIN — BACLOFEN 5 MG: 5 TABLET ORAL at 08:10

## 2022-10-12 RX ADMIN — METRONIDAZOLE 500 MG: 500 TABLET ORAL at 02:10

## 2022-10-12 RX ADMIN — PANTOPRAZOLE SODIUM 20 MG: 20 TABLET, DELAYED RELEASE ORAL at 09:10

## 2022-10-12 RX ADMIN — GABAPENTIN 100 MG: 100 CAPSULE ORAL at 05:10

## 2022-10-12 RX ADMIN — BACLOFEN 5 MG: 5 TABLET ORAL at 02:10

## 2022-10-12 RX ADMIN — ATORVASTATIN CALCIUM 40 MG: 40 TABLET, FILM COATED ORAL at 09:10

## 2022-10-12 RX ADMIN — APIXABAN 2.5 MG: 2.5 TABLET, FILM COATED ORAL at 08:10

## 2022-10-12 RX ADMIN — PREDNISONE 10 MG: 10 TABLET ORAL at 09:10

## 2022-10-12 RX ADMIN — METRONIDAZOLE 500 MG: 500 TABLET ORAL at 09:10

## 2022-10-12 RX ADMIN — Medication 100 MG: at 09:10

## 2022-10-12 RX ADMIN — GABAPENTIN 100 MG: 100 CAPSULE ORAL at 09:10

## 2022-10-12 RX ADMIN — CEFTRIAXONE 1 G: 1 INJECTION, SOLUTION INTRAVENOUS at 12:10

## 2022-10-12 RX ADMIN — Medication 400 MG: at 09:10

## 2022-10-12 RX ADMIN — SODIUM CHLORIDE, POTASSIUM CHLORIDE, SODIUM LACTATE AND CALCIUM CHLORIDE 1000 ML: 600; 310; 30; 20 INJECTION, SOLUTION INTRAVENOUS at 02:10

## 2022-10-12 RX ADMIN — MYCOPHENOLATE MOFETIL 500 MG: 250 CAPSULE ORAL at 08:10

## 2022-10-12 RX ADMIN — APIXABAN 2.5 MG: 2.5 TABLET, FILM COATED ORAL at 09:10

## 2022-10-12 RX ADMIN — SENNOSIDES AND DOCUSATE SODIUM 2 TABLET: 50; 8.6 TABLET ORAL at 09:10

## 2022-10-12 RX ADMIN — GABAPENTIN 300 MG: 300 CAPSULE ORAL at 08:10

## 2022-10-12 RX ADMIN — GABAPENTIN 300 MG: 300 CAPSULE ORAL at 02:10

## 2022-10-12 RX ADMIN — BACLOFEN 5 MG: 5 TABLET ORAL at 09:10

## 2022-10-12 RX ADMIN — MYCOPHENOLATE MOFETIL 500 MG: 250 CAPSULE ORAL at 09:10

## 2022-10-12 RX ADMIN — IPRATROPIUM BROMIDE AND ALBUTEROL SULFATE 3 ML: 2.5; .5 SOLUTION RESPIRATORY (INHALATION) at 06:10

## 2022-10-12 RX ADMIN — FERROUS SULFATE TAB 325 MG (65 MG ELEMENTAL FE) 1 EACH: 325 (65 FE) TAB at 09:10

## 2022-10-12 RX ADMIN — CIPROFLOXACIN 400 MG: 2 INJECTION, SOLUTION INTRAVENOUS at 02:10

## 2022-10-12 RX ADMIN — CHOLECALCIFEROL TAB 25 MCG (1000 UNIT) 2000 UNITS: 25 TAB at 09:10

## 2022-10-12 NOTE — ASSESSMENT & PLAN NOTE
- respiratory status stable, low concern for acute exacerbation  - continue prednisone and singulair, prn duo nebs

## 2022-10-12 NOTE — ASSESSMENT & PLAN NOTE
Patient's anemia is currently controlled.   Current CBC reviewed-   Lab Results   Component Value Date    HGB 8.2 (L) 10/12/2022    HCT 27.7 (L) 10/12/2022     Monitor serial CBC and transfuse if patient becomes hemodynamically unstable, symptomatic or H/H drops below 7/21.   - continue iron

## 2022-10-12 NOTE — SUBJECTIVE & OBJECTIVE
Past Medical History:   Diagnosis Date    Acute cystitis without hematuria 2/27/2020    Acute hypoxemic respiratory failure 4/11/2018    Acute respiratory failure with hypoxia 3/2/2020    Altered mental status     Anemia     Arthritis     Bilateral hand pain 3/14/2018    Branch retinal vein occlusion, left eye 2/20/2015    Chronic bilateral low back pain without sciatica 3/23/2017    Chronic renal failure in pediatric patient, stage 3 (moderate) 4/15/2018    Cognitive communication deficit 12/19/2017    Cystoid macular edema, left eye 2/20/2015    Cystoid macular edema, left eye 2/20/2015    Delirium 12/30/2021    DJD (degenerative joint disease) of cervical spine 5/15/2013    Enterococcal bacteremia     Fatty liver 8/26/2014    Goiter 4/9/2018    Hashimoto's disease     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    Hypertension     Hypertensive encephalopathy     IBS (irritable bowel syndrome) 6/21/2017    IGT (impaired glucose tolerance) 1/12/2016    Iron deficiency anemia secondary to inadequate dietary iron intake 6/24/2013    Joint pain     Keratoconjunctivitis sicca of both eyes not specified as Sjogren's 7/29/2016    Leiomyoma of uterus, unspecified 9/16/2014    Long QT interval 6/29/2016    Due to medication (plaquenil)     Macular edema 1/12/2015    Multinodular goiter 1/12/2016    Neuropathy 8/23/2017    Plaquenil causing adverse effect in therapeutic use 10/7/2016    Pneumococcal vaccine refused 8/17/2016    Pneumonia due to Streptococcus pneumoniae 4/5/2018    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/2015    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    s/p Right Total knee replacement 5/15/2013    Scoliosis of thoracic spine 5/15/2013    Small vessel disease, cerebrovascular 12/28/2017    Stroke     UTI (urinary tract infection) 12/29/2018    Vascular dementia 12/6/2017       Past Surgical History:   Procedure Laterality Date    BREAST CYST EXCISION      CATARACT EXTRACTION      COLONOSCOPY N/A 9/29/2015     Procedure: COLONOSCOPY;  Surgeon: FIDELINA Sanchez MD;  Location: [facility] ENDO (4TH FLR);  Service: Endoscopy;  Laterality: N/A;    EYE SURGERY      INJECTION OF ANESTHETIC AGENT AROUND NERVE Left 4/19/2021    Procedure: BLOCK, NERVE, FEMORAL AND OBTURATOR;  Surgeon: Shivam Gonzalez MD;  Location: [facility] PAIN MGT;  Service: Pain Management;  Laterality: Left;  consent needed    JOINT REPLACEMENT      right knee    KNEE SURGERY Left 12/31/2013    TKR    left parotidectomy      mixed tumor    CO EVAL,SWALLOW FUNCTION,CINE/VIDEO RECORD  6/5/2021         SALIVARY GLAND SURGERY      TONSILLECTOMY      UPPER GASTROINTESTINAL ENDOSCOPY         Review of patient's allergies indicates:  No Known Allergies    Current Facility-Administered Medications on File Prior to Encounter   Medication    onabotulinumtoxina injection 200 Units     Current Outpatient Medications on File Prior to Encounter   Medication Sig    acetaminophen (TYLENOL) 500 MG tablet Take 1 tablet (500 mg total) by mouth every 4 (four) hours as needed for Pain.    amLODIPine (NORVASC) 10 MG tablet TAKE 1 TABLET(10 MG) BY MOUTH EVERY DAY    apixaban (ELIQUIS) 2.5 mg Tab Take 1 tablet (2.5 mg total) by mouth 2 (two) times daily.    atorvastatin (LIPITOR) 40 MG tablet TAKE 1 TABLET(40 MG) BY MOUTH EVERY DAY    baclofen (LIORESAL) 10 MG tablet TAKE 1/2 TO 1 TABLET(5 TO 10 MG) BY MOUTH THREE TIMES DAILY AS NEEDED    bisacodyL (DULCOLAX) 10 mg Supp Place 1 suppository (10 mg total) rectally daily as needed (constipation).    ferrous sulfate 325 (65 FE) MG EC tablet Take 1 tablet (325 mg total) by mouth once daily.    furosemide (LASIX) 40 MG tablet Take 1 tablet (40 mg total) by mouth every other day. TAKE 1 TABLET(40 MG) BY MOUTH DAILY    gabapentin (NEURONTIN) 100 MG capsule Take 1 capsule (100 mg total) by mouth 2 (two) times daily. Take one 100mg capsule twice a day and one 300mg capsule at bedtime    gabapentin (NEURONTIN) 300 MG capsule Take 1 capsule (300 mg total) by  mouth every evening.    gabapentin 5% baclofen 2% amitriptyline 2% topical cream Apply topically 3 (three) times daily.    hydrOXYchloroQUINE (PLAQUENIL) 200 mg tablet Take 1 tablet (200 mg total) by mouth 2 (two) times daily.    LIDOcaine (LIDODERM) 5 % Place 1 patch onto the skin once daily. Remove & Discard patch within 12 hours or as directed by MD    magnesium oxide (MAG-OX) 400 mg (241.3 mg magnesium) tablet Take 400 mg by mouth once daily.    melatonin 12 mg Tab Take 9 mg by mouth nightly.    montelukast (SINGULAIR) 10 mg tablet Take 10 mg by mouth once daily.    mycophenolate (CELLCEPT) 500 mg Tab TAKE 1 TABLET TWICE A DAY    omeprazole (PRILOSEC) 20 MG capsule TAKE 1 CAPSULE(20 MG) BY MOUTH EVERY DAY    polyethylene glycol (GLYCOLAX) 17 gram PwPk Take 17 g by mouth daily as needed.    predniSONE (DELTASONE) 10 MG tablet Take 1 tablet (10 mg total) by mouth once daily.    senna-docusate 8.6-50 mg (PERICOLACE) 8.6-50 mg per tablet Take 2 tablets by mouth 2 (two) times daily.    simethicone (MYLICON) 80 MG chewable tablet Take 1 tablet (80 mg total) by mouth 3 (three) times daily as needed for Flatulence.    thiamine 100 MG tablet Take 100 mg by mouth once daily.    traMADoL (ULTRAM) 50 mg tablet Take 1 tablet (50 mg total) by mouth every 6 (six) hours as needed for Pain.    vitamin D (VITAMIN D3) 1000 units Tab Take 2 tablets (2,000 Units total) by mouth once daily.    [DISCONTINUED] albuterol (PROVENTIL/VENTOLIN HFA) 90 mcg/actuation inhaler Inhale 2 puffs into the lungs every 6 (six) hours as needed for Wheezing. Rescue (Patient not taking: Reported on 6/15/2022)    [DISCONTINUED] albuterol-ipratropium (DUO-NEB) 2.5 mg-0.5 mg/3 mL nebulizer solution Take 3 mLs by nebulization every 4 (four) hours as needed for Wheezing. Rescue (Patient not taking: Reported on 6/15/2022)    [DISCONTINUED] calcium carbonate (TUMS) 200 mg calcium (500 mg) chewable tablet Take 2 tablets (1,000 mg total) by mouth once daily.  (Patient not taking: No sig reported)    [DISCONTINUED] famotidine (PEPCID) 20 MG tablet Take 1 tablet (20 mg total) by mouth 2 (two) times daily. (Patient not taking: Reported on 6/15/2022)    [DISCONTINUED] lacosamide (VIMPAT) 10 mg/mL Soln oral solution Take 10 mLs (100 mg total) by mouth every 12 (twelve) hours. (Patient not taking: No sig reported)     Family History       Problem Relation (Age of Onset)    Breast cancer Maternal Grandmother    Cancer Father    Heart disease Mother    Hypertension Mother    Hyperthyroidism Mother, Other    Prostate cancer Father          Tobacco Use    Smoking status: Never    Smokeless tobacco: Never   Substance and Sexual Activity    Alcohol use: Yes     Alcohol/week: 0.0 standard drinks     Comment: very seldom     Drug use: No    Sexual activity: Not Currently     Comment: ,  age 52,      Review of Systems   Unable to perform ROS: Mental status change   Objective:     Vital Signs (Most Recent):  Temp: 98.2 °F (36.8 °C) (10/12/22 0417)  Pulse: 80 (10/12/22 0417)  Resp: 20 (10/12/22 0417)  BP: (!) 115/54 (10/12/22 0417)  SpO2: 96 % (10/12/22 0417)   Vital Signs (24h Range):  Temp:  [98 °F (36.7 °C)-99.2 °F (37.3 °C)] 98.2 °F (36.8 °C)  Pulse:  [] 80  Resp:  [15-20] 20  SpO2:  [95 %-99 %] 96 %  BP: (106-136)/(45-78) 115/54     Weight: 63 kg (139 lb)  Body mass index is 23.86 kg/m².    Physical Exam  Vitals and nursing note reviewed.   Constitutional:       General: She is not in acute distress.     Appearance: She is well-developed.      Comments: Patient is drowsy and unwilling to participate in exam. She is arousal to light touch.    HENT:      Head: Normocephalic and atraumatic.      Mouth/Throat:      Pharynx: No oropharyngeal exudate.   Eyes:      Conjunctiva/sclera: Conjunctivae normal.      Pupils: Pupils are equal, round, and reactive to light.   Cardiovascular:      Rate and Rhythm: Normal rate and regular rhythm.      Heart sounds: Normal heart  sounds.   Pulmonary:      Effort: Pulmonary effort is normal. No respiratory distress.   Abdominal:      General: Bowel sounds are normal. There is no distension.      Palpations: Abdomen is soft.      Tenderness: There is abdominal tenderness.      Comments: LLQ tenderness on palpation.    Musculoskeletal:         General: No tenderness. Normal range of motion.      Cervical back: Normal range of motion and neck supple.   Lymphadenopathy:      Cervical: No cervical adenopathy.   Skin:     General: Skin is warm and dry.      Capillary Refill: Capillary refill takes less than 2 seconds.      Findings: No rash.   Neurological:      General: No focal deficit present.      Mental Status: She is alert.      Cranial Nerves: No cranial nerve deficit.      Sensory: No sensory deficit.      Coordination: Coordination normal.      Comments: Unwilling to answer orientation questions   Psychiatric:         Behavior: Behavior normal.         Thought Content: Thought content normal.         Judgment: Judgment normal.         CRANIAL NERVES     CN III, IV, VI   Pupils are equal, round, and reactive to light.     Significant Labs: All pertinent labs within the past 24 hours have been reviewed.  BMP:   Recent Labs   Lab 10/12/22  0418   GLU 99   *   K 3.9      CO2 21*   BUN 19   CREATININE 0.8   CALCIUM 7.5*     CBC:   Recent Labs   Lab 10/11/22  1434 10/12/22  0418   WBC 10.80 10.70   HGB 9.9* 8.2*   HCT 32.4* 27.7*   PLT 84* 76*     Lactic Acid:   Recent Labs   Lab 10/11/22  1434 10/11/22  1931   LACTATE 0.7 0.7     Urine Studies:   Recent Labs   Lab 10/11/22  1800   COLORU Yellow   APPEARANCEUA Clear   PHUR 7.0   SPECGRAV 1.015   PROTEINUA 1+*   GLUCUA Negative   KETONESU 1+*   BILIRUBINUA Negative   OCCULTUA Negative   NITRITE Negative   LEUKOCYTESUR 2+*   RBCUA 4   WBCUA 14*   BACTERIA Rare   SQUAMEPITHEL 5   HYALINECASTS 0       Significant Imaging: I have reviewed all pertinent imaging results/findings within the  past 24 hours.    Imaging Results              CT Cervical Spine Without Contrast (Final result)  Result time 10/11/22 22:49:13      Final result by Flo Scott MD (10/11/22 22:49:13)                   Impression:      1. No evidence of C1 fracture as suggested on same day CT head.  2. Multilevel degenerative change of the cervical spine as detailed above.  3. Additional findings as above.    Electronically signed by resident: David Enciso  Date:    10/11/2022  Time:    22:15    Electronically signed by: Flo Scott MD  Date:    10/11/2022  Time:    22:49               Narrative:    EXAMINATION:  CT CERVICAL SPINE WITHOUT CONTRAST    CLINICAL HISTORY:  Neck pain, chronic, degenerative changes on xray;    TECHNIQUE:  Low dose axial images, sagittal and coronal reformations were performed though the cervical spine.  Contrast was not administered.    COMPARISON:  Same day CT head    CTA stroke multiphase 06/19/2022    Cervical spine radiograph 10/08/2021    MRI cervical spine 03/13/2017    FINDINGS:  Alignment: Mild anterolisthesis C2 on C3 and C7 on T1.  Mild retrolisthesis C3 on C4, and C6 on C7.    Vertebrae: Partial osseous fusion C5-C6.  Endplate irregularity/sclerosis C6-C7 similar to priors and likely degenerative.    Discs: Multilevel disc height loss most pronounced at C5-C6 and C6-C7. There are findings of chronic discitis-osteomyelitis at the C6-C7 level.    C1-2: Dens is intact.  Pre-dens space is maintained.    Skull base and craniocervical junction: Normal.    Degenerative findings:    C2-C3: Disc uncovering.  Bilateral uncovertebral and facet hypertrophy.  Mild bilateral neural foraminal narrowing.  No spinal canal stenosis.    C3-C4: Posterior disc osteophyte complex/disc uncovering.  Bilateral facet and uncovertebral hypertrophy.  Moderate left and mild right neural foraminal narrowing.  No spinal canal stenosis.    C4-C5: Posterior disc osteophyte complex.  Bilateral facet and uncovertebral  hypertrophy.  Moderate bilateral neural foraminal narrowing.  Moderate spinal canal stenosis.    C5-C6: Posterior disc osteophyte complex.  Bilateral facet and uncovertebral hypertrophy.  Mild right and mild-to-moderate left neural foraminal narrowing.  Moderate spinal canal stenosis.    C6-C7: Posterior disc osteophyte complex/disc uncovering.  Bilateral facet and uncovertebral hypertrophy.  Mild to moderate bilateral neural foraminal narrowing.  Mild spinal canal stenosis.    C7-T1: Disc uncovering.  No neural foraminal narrowing or spinal canal stenosis.    Paraspinal muscles & soft tissues: Unremarkable.    Additional findings: Right pleural effusion.  Ground-glass opacities in the left lung apex.  Partially visualized thyroid appears enlarged.                                       CT Abdomen Pelvis With Contrast (Final result)  Result time 10/11/22 22:43:08      Final result by Axel Herrera MD (10/11/22 22:43:08)                   Impression:      1. Mild fat stranding around the distal sigmoid colon and rectum without associated wall thickening.  Findings may be reflective of mild colitis/proctitis.  Diverticulitis also considered but felt less likely in the presence of presacral edema.  2. Small right pleural effusion with adjacent compressive atelectasis.  3. Nonspecific bilateral perinephric fat stranding with grossly normal renal enhancement.  Findings are nonspecific and could be related to medical renal disease though suggest correlation with urinalysis if there is concern for urinary tract infection.  4. Anasarca.  5. Additional findings as above.    Electronically signed by resident: David Enciso  Date:    10/11/2022  Time:    21:50    Electronically signed by: Axel Herrera MD  Date:    10/11/2022  Time:    22:43               Narrative:    EXAMINATION:  CT ABDOMEN PELVIS WITH CONTRAST    CLINICAL HISTORY:  Bowel obstruction suspected;Abdominal pain, acute, nonlocalized;    TECHNIQUE:  Axial  images of the abdomen and pelvis were acquired  after the use of 75 cc Bsmr201 IV contrast. Oral contrast not administered.  Coronal and sagittal reconstructions were also obtained    COMPARISON:  CT chest 01/23/2022    CT chest abdomen pelvis 04/11/2018    FINDINGS:  Heart: Left atrial enlargement.  No pericardial effusion. No significant calcific coronary atherosclerosis.    Lungs: Small right pleural effusion with adjacent compressive atelectasis.  Few scattered bands of subsegmental atelectasis.  No large consolidation.  No left pleural fluid.  No pneumothorax.    Liver: Normal in size and contour.  No focal hepatic lesion.    Gallbladder: No calcified gallstones.    Bile Ducts: Prominence of the extrahepatic common bile duct with normal in tapering at the ampulla similar to prior.    Pancreas: No mass or peripancreatic fat stranding.    Spleen: Unremarkable.    Stomach and duodenum: Small hiatal hernia, otherwise unremarkable.    Adrenals: Unremarkable.    Kidneys/ Ureters: Normal in size and location. Normal enhancement. No hydronephrosis or nephrolithiasis. No ureteral dilatation.  Bilateral perinephric fat stranding.  Right renal simple cyst and bilateral hypodensities too small to characterize.    Bladder: No evidence of wall thickening.    Reproductive organs: Uterus enlarged with multiple calcified fibroids.    Bowel/Mesentery: Small bowel is normal in caliber with no evidence of obstruction.  Appendix not definitively visualized.  No inflammatory change at the expected location of the appendix.  Colon demonstrates no focal wall thickening.  Diverticulosis coli.  Mild fat stranding around the distal sigmoid colon and rectum.    Peritoneum: No intraperitoneal free air or fluid.    Lymph nodes: No retroperitoneal lymphadenopathy.    Vasculature: No aneurysm. Mild scattered calcific atherosclerosis.    Abdominal wall:  Diffuse body wall edema.    Bones: Degenerative change of the spine.  Wedge compression  deformity T12 similar to CT chest 01/23/2022.  No acute fracture. No suspicious osseous lesions.                                       CT Head Without Contrast (Final result)  Result time 10/11/22 19:21:24      Final result by Flo Scott MD (10/11/22 19:21:24)                   Impression:      No acute intracranial abnormality.  Additional evaluation, as warranted.    Generalized cerebral volume loss, chronic microvascular ischemic change, and remote lacunar infarcts.    Probable age-indeterminate fracture of the C1 ring.  Dedicated noncontrast CT scan of the cervical spine may be obtained for further evaluation.    Electronically signed by resident: Jered Larson  Date:    10/11/2022  Time:    18:59    Electronically signed by: Flo Scott MD  Date:    10/11/2022  Time:    19:21               Narrative:    EXAMINATION:  CT HEAD WITHOUT CONTRAST    CLINICAL HISTORY:  Mental status change, unknown cause;    TECHNIQUE:  Low dose axial CT images obtained throughout the head without the use of intravenous contrast.  Axial, sagittal and coronal reconstructions were performed.    COMPARISON:  CT head 06/20/2022.    MRI brain 06/19/2022.    FINDINGS:  Intracranial compartment:    Hypodensity within the posterior aspect of the right corona radiata not present on prior CT from 06/20/2022 likely reflects sequela of evolving infarct identified on MRI brain from 06/19/2022.    Remaining brain appears stable noting remote infarcts within the anterior limb of the left internal capsule, bilateral cerebellar hemispheres, and tatyana.  No parenchymal mass, hemorrhage, edema or major vascular distribution infarct.    No extra-axial blood or fluid collections.    Skull/extracranial contents (limited evaluation):    Hyperostosis frontalis interna.  There is a probable age-indeterminate fracture involving the C1 ring.  There is incompletely visualized.  Mastoid air cells and paranasal sinuses are essentially clear. Scattered  atherosclerotic calcification about the skull base.  There are postoperative changes in the globes.                                       X-Ray Chest 1 View (Final result)  Result time 10/11/22 16:43:14      Final result by Ben Christianson MD (10/11/22 16:43:14)                   Impression:      1. Pulmonary findings may reflect interval edema noting stable configuration of the mediastinum.      Electronically signed by: Ben Christianson MD  Date:    10/11/2022  Time:    16:43               Narrative:    EXAMINATION:  XR CHEST 1 VIEW    CLINICAL HISTORY:  Shortness of breath    TECHNIQUE:  Single frontal view of the chest was performed.    COMPARISON:  01/29/2022    FINDINGS:  The cardiomediastinal silhouette is prominent, particularly involving the upper mediastinum, similar in appearance to the previous exam.  There is calcification of the aorta..  There is obscuration of the right costophrenic angle suggesting effusion..  The trachea is midline.  The lungs are symmetrically expanded bilaterally with coarse central hilar interstitial attenuation suggesting edema..  There is bandlike atelectasis or scarring projected along the periphery of the right upper lung zone.  There is no pneumothorax.  The osseous structures are remarkable for degenerative changes..

## 2022-10-12 NOTE — ASSESSMENT & PLAN NOTE
Patient with Paroxysmal (<7 days) atrial fibrillation which is controlled currently with no rate or rhythm control agents. . Patient is currently in sinus rhythm.[id number] Score: 6.. Anticoagulation indicated. Anticoagulation done with eliquis.

## 2022-10-12 NOTE — ASSESSMENT & PLAN NOTE
Results for orders placed during the hospital encounter of 06/19/22    Echo    Interpretation Summary  · The left ventricle is normal in size with normal systolic function.  · The estimated ejection fraction is 55%.  · Normal left ventricular diastolic function.  · Mild-to-moderate mitral regurgitation.  · Normal right ventricular size with normal right ventricular systolic function.  · Trivial posterolateral pericardial effusion.

## 2022-10-12 NOTE — PHARMACY MED REC
"  Admission Medication History     The home medication history was taken by Raven Obrien.    You may go to "Admission" then "Reconcile Home Medications" tabs to review and/or act upon these items.     The home medication list has been updated by the Pharmacy department.   Please read ALL comments highlighted in yellow.   Please address this information as you see fit.    Feel free to contact us if you have any questions or require assistance.      The medications listed below were removed from the home medication list. Please reorder if appropriate:  Patient reports no longer taking the following medication(s):  OMEPRAZOLE 20 MG CAP  POLYETHYLENE GLYCOL 17 GM PWPK  SENNA-DOCUSATE 8.6-50 MG TAB  SIMETHICONE 80 MG CHEW TAB    Medications listed below were obtained from: Rosy - daughter    Current Outpatient Medications on File Prior to Encounter   Medication Sig    acetaminophen (TYLENOL) 500 MG tablet Take 1,000 mg by mouth 2 (two) times daily as needed (Headache).      albuterol-ipratropium (DUO-NEB) 2.5 mg-0.5 mg/3 mL nebulizer solution   Take 3 mLs by nebulization 2 (two) times daily as needed for Shortness of Breath. Rescue    amLODIPine (NORVASC) 10 MG tablet   TAKE 1 TABLET(10 MG) BY MOUTH EVERY DAY    apixaban (ELIQUIS) 5 mg Tab   Take 5 mg by mouth 2 (two) times daily.    atorvastatin (LIPITOR) 40 MG tablet   Take 40 mg by mouth every evening.      baclofen (LIORESAL) 10 MG tablet Takes 1/2 tablet (5 mg) at lunch, 1/2 tablet with dinner and 1 tablet at bedtime.      carvediloL (COREG) 3.125 MG tablet   Take 3.125 mg by mouth 2 (two) times daily.    ferrous sulfate 325 (65 FE) MG EC tablet   Take 325 mg by mouth every other day.    gabapentin (NEURONTIN) 100 MG capsule Take 1 capsule (100 mg total) by mouth 2 (two) times daily. Take one 100mg capsule twice a day and one 300mg capsule at bedtime.      gabapentin (NEURONTIN) 300 MG capsule   Take 1 capsule (300 mg total) by mouth every evening.    gabapentin " 5% baclofen 2% amitriptyline 2% topical cream   Apply topically 3 (three) times daily.    hydrOXYchloroQUINE (PLAQUENIL) 200 mg tablet   Take 1 tablet (200 mg total) by mouth 2 (two) times daily.    LIDOcaine (LIDODERM) 5 % Place 1 patch onto the skin once daily. Remove & Discard patch within 12 hours as needed for pain or as directed by MD.      magnesium oxide (MAG-OX) 400 mg (241.3 mg   magnesium) tablet   Take 400 mg by mouth once daily.    mycophenolate (CELLCEPT) 500 mg Tab   TAKE 1 TABLET TWICE A DAY    pantoprazole (PROTONIX) 40 MG tablet   Take 40 mg by mouth once daily.    predniSONE (DELTASONE) 10 MG tablet   Take 1 tablet (10 mg total) by mouth once daily.    thiamine 100 MG tablet   Take 100 mg by mouth once daily.    traZODone (DESYREL) 50 MG tablet   Take 50 mg by mouth every evening.    vitamin D (VITAMIN D3) 1000 units Tab   Take 2 tablets (2,000 Units total) by mouth once daily.    apixaban (ELIQUIS) 2.5 mg Tab   Take 1 tablet (2.5 mg total) by mouth 2 (two) times daily.    bisacodyL (DULCOLAX) 10 mg Supp   Place 1 suppository (10 mg total) rectally daily as needed (constipation).    montelukast (SINGULAIR) 10 mg tablet   Take 10 mg by mouth once daily.       Potential issues to be addressed PRIOR TO DISCHARGE  Patient reported not taking the following medications: (TRAMADOL). These medications remain on the home medication list. Please address accordingly.   Prescriptions could not be filled prior to admission: (SINGULAIR)    Raven Obrien CPhT  EXT [id number]                .

## 2022-10-12 NOTE — PLAN OF CARE
10/12/22 1058   Post-Acute Status   Post-Acute Authorization Home Health   Home Health Status Pending medical clearance/testing   Coverage Medicare   Discharge Delays None known at this time   Discharge Plan   Discharge Plan A Home with family;Home Health   Discharge Plan B Home with family     Pending HH orders for PT/OT.  will send HH referrals prior to d/c. No other needs at this time.     UPDATE: HH referrals sent via [facility].     Madison Urrutia LMSW  ED   [facility]  Ext. [phone]

## 2022-10-12 NOTE — ASSESSMENT & PLAN NOTE
Colitis   Acute cystitis     Lethargy insetting of abdominal pain, vomiting. CTH negative. Flu and covid neg. UA infectious. CT concerning for bilateral perinephric fat stranding and mild proctitis/colitis. AFVSS. WBC 10K, left shift present.   - Given 1g CTX in ED   - will change to cipro/flagyl to include colitis coverage.   - gentle IVF  - lactic wnl  - follow urine and blood cultures  - monitor for changes in bowel movements, may require stool studies

## 2022-10-12 NOTE — HPI
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 The daughter is at bedside to provide hx. She states on Sunday her mother went to [employer] and was exposed to COVID. After [employer] she went out to eat and when she came home she was nauseous and had an episode of vomiting, with associated abdominal pain. Since then she has been somnolent and unlike herself, unwilling to get out of bed. Per daughter, she is arousable and is able to hold a conversation. She denies fevers or chills, increased cough, SOB, changes in BM, dysuria, bleeding, lightheadedness, falls, or confusion.     In the ED, T 99.2 F, , RR 16, /63. 98% on room air. CBC with stable microcytic anemia. WBC 10.8K, left shift present. Na 132. T bili 1.8. Glucose 74. , Tn 0.019. Lactic 0.7. TSH wnl. VBG with mild hypoxia, O2 36. Flu and covid negative. UA infectious, culture pending. Blood cultures collected. CT abdomen pelvis concerning for mild proctitis/colitis, bilateral perinephric fat stranding, small R pleural effusion, and anasarca. CXR with possible interval edema. CTH and c-spine without acute process. Given 1g rocephin.

## 2022-10-12 NOTE — ASSESSMENT & PLAN NOTE
- BP soft. Will hold amlodipine. Gentle IVF.  - continue lasix every other day, monitor bmp closely

## 2022-10-12 NOTE — PLAN OF CARE
SW met with pt in REU to complete initial assessment. Pt here for increased lethargy and weakness following possible COVID exposure on Sunday.     Pt here with private caregiver, Deyanira, at bedside. Pt states that she has been feeling very fatigued and sick since the weekend. Pt states her baseline ambulation/functioning has not changed in the past week but she has experienced a chronic decline in functioning since leaving rehab in June 2022. Prior to rehab pt was only using her walker intermittently for ambulation outside the home, but now needs it for all ambulation and also using a wheelchair outside the home for longer distances. Pt feels that she did not receive sufficient treatment at rehab. Pt is interested in HH for PT/OT. Pt's 2 daughters live with her and she also has a private caregiver who comes from [address] when her daughters are out of the home to assist with ADLs. Pt has a bath bench and bedside commode. Pt otherwise feels that she has sufficient support at home and denies any other current discharge needs.     Provider team updated. HH orders requested. EBONIE will continue to monitor.     Madison Urrutia LM  ED   [facility]  Ext. [phone]      [facility] - Emergency Dept  Initial Discharge Assessment       Primary Care Provider: Bhargav Hirsch MD    Admission Diagnosis: Shortness of breath [R06.02]    Admission Date: 10/11/2022  Expected Discharge Date:     Discharge Barriers Identified: None    Payor: MEDICARE / Plan: MEDICARE PART A & B / Product Type: Government /     Extended Emergency Contact Information  Primary Emergency Contact: Rosy Rosado  Address: [address] [place] [address] [place]  Mobile Phone: [phone]  Relation: Daughter  Secondary Emergency Contact: Madhavi Lomas  Address: [address] [place] [address] [place]  Mobile Phone: [phone]  Relation: Daughter    Discharge  Plan A: Home with family, Home Health  Discharge Plan B: Home with family      [id number] - [place], [place] - [address] & [facility]  [address]  [facility] [address]  Phone: [phone] Fax: [phone]    [facility] - [place], [place] - [address] [facility] [facility]  [address] [facility] [address]  Phone: [phone] Fax: [phone]      Initial Assessment (most recent)       Adult Discharge Assessment - 10/12/22 1049          Discharge Assessment    Assessment Type Discharge Planning Assessment     Confirmed/corrected address, phone number and insurance Yes     Confirmed Demographics Correct on Facesheet     Source of Information patient;health record     When was your last doctors appointment? --   2 mos ago    Does patient/caregiver understand observation status Yes     Communicated LETICIA with patient/caregiver Yes     Reason For Admission SOB, Malaise     Lives With child(yash), adult     Do you expect to return to your current living situation? Yes     Do you have help at home or someone to help you manage your care at home? Yes     Who are your caregiver(s) and their phone number(s)? Rosy Rosado (Daughter)   [phone], pt also has private caregiver, Deyanira     Prior to hospitilization cognitive status: No Deficits;Alert/Oriented     Current cognitive status: Alert/Oriented;No Deficits     Walking or Climbing Stairs Difficulty ambulation difficulty, requires equipment     Mobility Management Walker in the home, wheelchair outside of the home     Dressing/Bathing Difficulty bathing difficulty, requires equipment;bathing difficulty, assistance 1 person     Dressing/Bathing Management [place], [place], caregiver assists w/hygiene     Do you have any problems with: Needs other help     Specify other help Transportation, meal prep     Home Accessibility wheelchair accessible     Number of Stairs, Main Entrance none     Home Layout Able to live  on 1st floor     Equipment Currently Used at Home walker, rolling;shower chair;wheelchair;bedside commode     Readmission within 30 days? No     Patient currently being followed by outpatient case management? No     Do you currently have service(s) that help you manage your care at home? Yes     How Many hours does patient receive services --   9-3, M-F    Name and Contact number of agency Deyanira, private caregiver     Is the pt/caregiver preference to resume services with current agency --   Prefers to not resume with [facility]    Do you take prescription medications? Yes     Do you have prescription coverage? Yes     Coverage Medicare     Do you have any problems affording any of your prescribed medications? No     Is the patient taking medications as prescribed? yes     Who is going to help you get home at discharge? Family     How do you get to doctors appointments? family or friend will provide     Are you on dialysis? No     Do you take coumadin? No     Discharge Plan A Home with family;Home Health     Discharge Plan B Home with family     DME Needed Upon Discharge  none     Discharge Plan discussed with: Caregiver;Patient     Name(s) and Number(s) Deyanira     Discharge Barriers Identified None        Relationship/Environment    Name(s) of Who Lives With Patient Rosy Rosado (Daughter)

## 2022-10-12 NOTE — PLAN OF CARE
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 [I63.311]  Yes    Immunocompromised state due to drug therapy [D84.821, Z79.899]  Not Applicable    RA (rheumatoid arthritis) [M06.9]  Yes    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  Most recent echocardiogram in August 2019 showed decreased LVEF at 40% with some evidence of left-sided heart disease.      The clinical picture currently is more suggestive of WHO 2 pulmonary hypertension that is pretty mild.  There may be an element of WHO 3 pulmonary hypertension at times of more deranged gas exchange.      Cryptogenic organizing pneumonia [J84.116]  Yes     Clinically suspected due to recurring infiltrates on CXR in the setting of rheumatoid arthritis.  Work up for infection has been negative and the clinical picture is less consistent with acute infection.  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      Thrombocytopenia [D69.6]  Yes    AF (paroxysmal atrial fibrillation) [I48.0]  Yes    Vascular dementia [F01.50]  Yes    Hypertension, essential [I10]  Yes    Iron deficiency anemia secondary to inadequate dietary iron intake [D50.8]  Yes      Resolved Hospital Problems    Diagnosis Date Resolved POA    Chronic systolic (congestive) heart failure [I50.22] 10/12/2022 Yes     Last Assessment & Plan:   Formatting of this note might be different from the original.  -last ANTLOIN was done on 03/01/2020:  Grade 1 mild left ventricular diastolic dysfunction         Follow Up Appointments:  Future Appointments   Date Time Provider Department Center   10/14/2022  8:20 AM ANA [facility] PAINMGT [facility]   10/18/2022  2:00 PM Baldomero Francis MD [facility] PULMSVC [facility]   10/19/2022 10:00 AM Nadja Valdez NP [facility] [id number] [facility]   12/9/2022 10:30 AM Farida Garcia MD [facility] CARDIO [facility]       Allergies:Review of patient's allergies indicates:  No Known Allergies    Medications: Review discharge medications with patient and family and provide education.    Current Facility-Administered Medications   Medication Dose Route Frequency Provider Last Rate Last Admin    acetaminophen tablet 650 mg  650 mg Oral Q6H PRN Kennedi Ambrocio PA-C        albuterol-ipratropium 2.5 mg-0.5 mg/3 mL nebulizer solution 3 mL  3 mL Nebulization Q4H PRN Kennedi Ambrocio PA-C        aluminum-magnesium hydroxide-simethicone 200-200-20 mg/5 mL suspension 30 mL  30 mL Oral QID PRN Kennedi Ambrocio PA-C        apixaban tablet 2.5 mg  2.5 mg Oral BID Kennedi Ambrocio PA-C   2.5 mg at 10/12/22 0933    atorvastatin tablet 40 mg  40 mg Oral Daily Kennedi Ambrocio PA-C   40 mg at 10/12/22 0927    baclofen tablet 5 mg  5 mg Oral TID Kennedi Ambrocio PA-C   5 mg at 10/12/22 0931    bisacodyL suppository 10 mg  10 mg Rectal Daily PRN Kennedi Ambrocio PA-C        [START ON 10/13/2022] cefTRIAXone  (ROCEPHIN) 1 g/50 mL D5W IVPB  1 g Intravenous Q24H Candi Forman PA-C        dextrose 10% bolus 125 mL  12.5 g Intravenous PRN Kennedi Ambrocio PA-C        dextrose 10% bolus 250 mL  25 g Intravenous PRN Kennedi Ambrocio PA-C        ferrous sulfate tablet 1 each  1 tablet Oral Daily Kennedi Ambrocio PA-C   1 each at 10/12/22 0932    [START ON 10/13/2022] furosemide tablet 40 mg  40 mg Oral Every other day Kennedi Ambrocio PA-C        gabapentin capsule 100 mg  100 mg Oral BID WM Kennedi Ambrocio PA-C   100 mg at 10/12/22 0918    gabapentin capsule 300 mg  300 mg Oral QHS Kennedi Ambrocio PA-C   300 mg at 10/12/22 0210    glucagon (human recombinant) injection 1 mg  1 mg Intramuscular PRN Kennedi Ambrocio PA-C        glucose chewable tablet 16 g  16 g Oral PRN Kennedi Ambrocio PA-C        glucose chewable tablet 24 g  24 g Oral PRN Kennedi Ambrocio PA-C        magnesium oxide tablet 400 mg  400 mg Oral Daily Kennedi Ambrocio PA-C   400 mg at 10/12/22 0929    melatonin tablet 9 mg  9 mg Oral Nightly PRN Kennedi Ambrocio PA-C        metroNIDAZOLE tablet 500 mg  500 mg Oral Q8H Kennedi Ambrocio PA-C   500 mg at 10/12/22 0920    montelukast tablet 10 mg  10 mg Oral Daily Kennedi Ambrocio PA-C   10 mg at 10/12/22 0928    mycophenolate capsule 500 mg  500 mg Oral BID Kennedi Ambrocio PA-C   500 mg at 10/12/22 0921    naloxone 0.4 mg/mL injection 0.02 mg  0.02 mg Intravenous PRN Kennedi Ambrocio PA-C        onabotulinumtoxina injection 200 Units  200 Units Intramuscular Q90 Days Baldomero Giang MD   200 Units at 07/29/20 2303    ondansetron disintegrating tablet 8 mg  8 mg Oral Q8H PRN Kennedi Ambrocio PA-C        pantoprazole EC tablet 20 mg  20 mg Oral Daily Kennedi Ambrocio PA-C   20 mg at 10/12/22 0932    predniSONE tablet 10 mg  10 mg Oral Daily Kennedi Ambrocio PA-C   10 mg at 10/12/22 0928    senna-docusate 8.6-50 mg per tablet 2 tablet  2 tablet Oral BID Kennedi POOLE  JOSEPHINE Ambrocio        simethicone chewable tablet 80 mg  80 mg Oral TID PRN Kennedi Ambrocio PA-C        sodium chloride 0.9% flush 10 mL  10 mL Intravenous Q8H PRN Kennedi Ambrocio PA-C        thiamine tablet 100 mg  100 mg Oral Daily Kennedi Ambrocio PA-C   100 mg at 10/12/22 0924    traMADoL tablet 50 mg  50 mg Oral Q6H PRN Kennedi Ambrocio PA-C        vitamin D 1000 units tablet 2,000 Units  2,000 Units Oral Daily Kennedi Ambrocio PA-C   2,000 Units at 10/12/22 0926     Current Outpatient Medications   Medication Sig Dispense Refill    acetaminophen (TYLENOL) 500 MG tablet Take 1 tablet (500 mg total) by mouth every 4 (four) hours as needed for Pain. (Patient taking differently: Take 1,000 mg by mouth 2 (two) times daily as needed (Headache).) 60 tablet 0    albuterol-ipratropium (DUO-NEB) 2.5 mg-0.5 mg/3 mL nebulizer solution Take 3 mLs by nebulization 2 (two) times daily as needed for Shortness of Breath. Rescue      amLODIPine (NORVASC) 10 MG tablet TAKE 1 TABLET(10 MG) BY MOUTH EVERY DAY 90 tablet 3    apixaban (ELIQUIS) 5 mg Tab Take 5 mg by mouth 2 (two) times daily.      atorvastatin (LIPITOR) 40 MG tablet TAKE 1 TABLET(40 MG) BY MOUTH EVERY DAY (Patient taking differently: Take 40 mg by mouth every evening.) 90 tablet 3    baclofen (LIORESAL) 10 MG tablet TAKE 1/2 TO 1 TABLET(5 TO 10 MG) BY MOUTH THREE TIMES DAILY AS NEEDED (Patient taking differently: Takes 1/2 tablet (5 mg) at lunch, 1/2 tablet with dinner and 1 tablet at bedtime.) 90 tablet 2    carvediloL (COREG) 3.125 MG tablet Take 3.125 mg by mouth 2 (two) times daily.      ferrous sulfate 325 (65 FE) MG EC tablet Take 1 tablet (325 mg total) by mouth once daily. (Patient taking differently: Take 325 mg by mouth every other day.)  0    gabapentin (NEURONTIN) 100 MG capsule Take 1 capsule (100 mg total) by mouth 2 (two) times daily. Take one 100mg capsule twice a day and one 300mg capsule at bedtime 180 capsule 2    gabapentin (NEURONTIN)  300 MG capsule Take 1 capsule (300 mg total) by mouth every evening. 90 capsule 2    gabapentin 5% baclofen 2% amitriptyline 2% topical cream Apply topically 3 (three) times daily. 240 g 2    hydrOXYchloroQUINE (PLAQUENIL) 200 mg tablet Take 1 tablet (200 mg total) by mouth 2 (two) times daily. 180 tablet 11    LIDOcaine (LIDODERM) 5 % Place 1 patch onto the skin once daily. Remove & Discard patch within 12 hours or as directed by MD (Patient taking differently: Place 1 patch onto the skin once daily. Remove & Discard patch within 12 hours as needed for pain or as directed by MD) 30 patch 2    magnesium oxide (MAG-OX) 400 mg (241.3 mg magnesium) tablet Take 400 mg by mouth once daily.      mycophenolate (CELLCEPT) 500 mg Tab TAKE 1 TABLET TWICE A  tablet 3    pantoprazole (PROTONIX) 40 MG tablet Take 40 mg by mouth once daily.      predniSONE (DELTASONE) 10 MG tablet Take 1 tablet (10 mg total) by mouth once daily. 30 tablet 2    thiamine 100 MG tablet Take 100 mg by mouth once daily.      traZODone (DESYREL) 50 MG tablet Take 50 mg by mouth every evening.      vitamin D (VITAMIN D3) 1000 units Tab Take 2 tablets (2,000 Units total) by mouth once daily. 30 tablet 2    apixaban (ELIQUIS) 2.5 mg Tab Take 1 tablet (2.5 mg total) by mouth 2 (two) times daily. 180 tablet 3    bisacodyL (DULCOLAX) 10 mg Supp Place 1 suppository (10 mg total) rectally daily as needed (constipation). 30 suppository 0    montelukast (SINGULAIR) 10 mg tablet Take 10 mg by mouth once daily.      traMADoL (ULTRAM) 50 mg tablet Take 1 tablet (50 mg total) by mouth every 6 (six) hours as needed for Pain. (Patient not taking: Reported on 10/12/2022) 90 tablet 0     Current Discharge Medication List        CONTINUE these medications which have NOT CHANGED    Details   acetaminophen (TYLENOL) 500 MG tablet Take 1 tablet (500 mg total) by mouth every 4 (four) hours as needed for Pain.  Qty: 60 tablet, Refills: 0      albuterol-ipratropium  (DUO-NEB) 2.5 mg-0.5 mg/3 mL nebulizer solution Take 3 mLs by nebulization 2 (two) times daily as needed for Shortness of Breath. Rescue      amLODIPine (NORVASC) 10 MG tablet TAKE 1 TABLET(10 MG) BY MOUTH EVERY DAY  Qty: 90 tablet, Refills: 3      !! apixaban (ELIQUIS) 5 mg Tab Take 5 mg by mouth 2 (two) times daily.      atorvastatin (LIPITOR) 40 MG tablet TAKE 1 TABLET(40 MG) BY MOUTH EVERY DAY  Qty: 90 tablet, Refills: 3      baclofen (LIORESAL) 10 MG tablet TAKE 1/2 TO 1 TABLET(5 TO 10 MG) BY MOUTH THREE TIMES DAILY AS NEEDED  Qty: 90 tablet, Refills: 2    Associated Diagnoses: Spinal stenosis, unspecified spinal region; Spondylosis of lumbar region without myelopathy or radiculopathy; Spondylosis of cervical region without myelopathy or radiculopathy      carvediloL (COREG) 3.125 MG tablet Take 3.125 mg by mouth 2 (two) times daily.      ferrous sulfate 325 (65 FE) MG EC tablet Take 1 tablet (325 mg total) by mouth once daily.  Refills: 0      !! gabapentin (NEURONTIN) 100 MG capsule Take 1 capsule (100 mg total) by mouth 2 (two) times daily. Take one 100mg capsule twice a day and one 300mg capsule at bedtime  Qty: 180 capsule, Refills: 2    Associated Diagnoses: Spinal stenosis, unspecified spinal region; Spondylosis of lumbar region without myelopathy or radiculopathy; Spondylosis of cervical region without myelopathy or radiculopathy      !! gabapentin (NEURONTIN) 300 MG capsule Take 1 capsule (300 mg total) by mouth every evening.  Qty: 90 capsule, Refills: 2    Associated Diagnoses: Spinal stenosis, unspecified spinal region; Spondylosis of lumbar region without myelopathy or radiculopathy; Spondylosis of cervical region without myelopathy or radiculopathy      gabapentin 5% baclofen 2% amitriptyline 2% topical cream Apply topically 3 (three) times daily.  Qty: 240 g, Refills: 2      hydrOXYchloroQUINE (PLAQUENIL) 200 mg tablet Take 1 tablet (200 mg total) by mouth 2 (two) times daily.  Qty: 180 tablet,  Refills: 11    Comments: Resume 02/12/22- 20d following covid infection  Associated Diagnoses: Seropositive rheumatoid arthritis of multiple sites      LIDOcaine (LIDODERM) 5 % Place 1 patch onto the skin once daily. Remove & Discard patch within 12 hours or as directed by MD  Qty: 30 patch, Refills: 2      magnesium oxide (MAG-OX) 400 mg (241.3 mg magnesium) tablet Take 400 mg by mouth once daily.      mycophenolate (CELLCEPT) 500 mg Tab TAKE 1 TABLET TWICE A DAY  Qty: 180 tablet, Refills: 3    Comments: YOUR PATIENT HAS REQUESTED A REFILL OF THIS MEDICATION, PREVIOUSLY AUTHORIZED BY ANOTHER PRESCRIBER.  Associated Diagnoses: Cryptogenic organizing pneumonia      pantoprazole (PROTONIX) 40 MG tablet Take 40 mg by mouth once daily.      predniSONE (DELTASONE) 10 MG tablet Take 1 tablet (10 mg total) by mouth once daily.  Qty: 30 tablet, Refills: 2      thiamine 100 MG tablet Take 100 mg by mouth once daily.      traZODone (DESYREL) 50 MG tablet Take 50 mg by mouth every evening.      vitamin D (VITAMIN D3) 1000 units Tab Take 2 tablets (2,000 Units total) by mouth once daily.  Qty: 30 tablet, Refills: 2      !! apixaban (ELIQUIS) 2.5 mg Tab Take 1 tablet (2.5 mg total) by mouth 2 (two) times daily.  Qty: 180 tablet, Refills: 3      bisacodyL (DULCOLAX) 10 mg Supp Place 1 suppository (10 mg total) rectally daily as needed (constipation).  Qty: 30 suppository, Refills: 0      montelukast (SINGULAIR) 10 mg tablet Take 10 mg by mouth once daily.      traMADoL (ULTRAM) 50 mg tablet Take 1 tablet (50 mg total) by mouth every 6 (six) hours as needed for Pain.  Qty: 90 tablet, Refills: 0    Comments: Quantity prescribed more than 7 day supply? Yes, quantity medically necessary  Associated Diagnoses: Rheumatoid arthritis involving multiple joints       !! - Potential duplicate medications found. Please discuss with provider.        STOP taking these medications       furosemide (LASIX) 40 MG tablet Comments:   Reason for  Stopping:         omeprazole (PRILOSEC) 20 MG capsule Comments:   Reason for Stopping:         senna-docusate 8.6-50 mg (PERICOLACE) 8.6-50 mg per tablet Comments:   Reason for Stopping:         simethicone (MYLICON) 80 MG chewable tablet Comments:   Reason for Stopping:                 I have seen and examined this patient within the last 30 days. My clinical findings that support the need for the home health skilled services and home bound status are the following:no   Weakness/numbness causing balance and gait disturbance due to Weakness/Debility making it taxing to leave home.     Diet:   cardiac diet    Labs:  Per facility     Referrals/ Consults  Physical Therapy to evaluate and treat. Evaluate for home safety and equipment needs; Establish/upgrade home exercise program. Perform / instruct on therapeutic exercises, gait training, transfer training, and Range of Motion.  Occupational Therapy to evaluate and treat. Evaluate home environment for safety and equipment needs. Perform/Instruct on transfers, ADL training, ROM, and therapeutic exercises.  Aide to provide assistance with personal care, ADLs, and vital signs.    Activities:   activity as tolerated    Nursing:   Agency to admit patient within 24 hours of hospital discharge unless specified on physician order or at patient request    SN to complete comprehensive assessment including routine vital signs. Instruct on disease process and s/s of complications to report to MD. Review/verify medication list sent home with the patient at time of discharge  and instruct patient/caregiver as needed. Frequency may be adjusted depending on start of care date.     Skilled nurse to perform up to 3 visits PRN for symptoms related to diagnosis    Notify MD if SBP > 160 or < 90; DBP > 90 or < 50; HR > 120 or < 50; Temp > 101; O2 < 88%;     Ok to schedule additional visits based on staff availability and patient request on consecutive days within the [facility]  episode.    When multiple disciplines ordered:    Start of Care occurs on Sunday - Wednesday schedule remaining discipline evaluations as ordered on separate consecutive days following the start of care.    Thursday SOC -schedule subsequent evaluations Friday and Monday the following week.     Friday - Saturday SOC - schedule subsequent discipline evaluations on consecutive days starting Monday of the following week.    For all post-discharge communication and subsequent orders please contact patient's primary care physician. If unable to reach primary care physician or do not receive response within 30 minutes, please contact PCP for clinical staff order clarification    Miscellaneous   Routine Skin for Bedridden Patients: Instruct patient/caregiver to apply moisture barrier cream to all skin folds and wet areas in perineal area daily and after baths and all bowel movements.    Home Health Aide:  Nursing Three times weekly, Physical Therapy Three times weekly, Occupational Therapy Three times weekly, and Home Health Aide Three times weekly    Wound Care Orders  no    I certify that this patient is confined to her home and needs intermittent skilled nursing care, physical therapy, and occupational therapy.

## 2022-10-12 NOTE — SUBJECTIVE & OBJECTIVE
Interval History:  NAEON. VSS, afebrile.   Evaluated at bedside, caregiver also at bedside. Caregiver states patient is back to mental baseline today. Patient endorses feeling well today. States she overate food at her sisters house this weekend which caused her to have 1 episode of nausea and vomiting before coming to the hospital. Denies any nausea, vomiting, abdominal pain or diarrhea today. UA infectious, started on IV ceftriaxone - denies any hematuria or dysuria previously.   Was transitioned to PO cipro and flagyl for colitis coverage however has prior urine cultures growing organisms resistant to cipro. Changed antibiotics to ceftriaxone and flagyl for broader coverage.   Noted to be coughing frequently on exam, when questioned states this is chronic. CXR ordered.  No new complaints at this time. All questions answered.     Review of Systems   Constitutional:  Positive for activity change (decreased 2/2 encephalopathy, resolved). Negative for chills and fever.   HENT:  Negative for trouble swallowing.    Eyes:  Negative for photophobia and visual disturbance.   Respiratory:  Positive for cough. Negative for chest tightness and shortness of breath.    Cardiovascular:  Negative for chest pain, palpitations and leg swelling.   Gastrointestinal:  Negative for abdominal pain, constipation, diarrhea, nausea and vomiting.   Genitourinary:  Negative for dysuria, frequency and hematuria.   Musculoskeletal:  Negative for back pain, gait problem and neck pain.   Skin:  Negative for rash and wound.   Neurological:  Negative for dizziness, syncope, speech difficulty, weakness and light-headedness.   Psychiatric/Behavioral:  Negative for agitation and confusion. The patient is not nervous/anxious.    Objective:     Vital Signs (Most Recent):  Temp: 98.2 °F (36.8 °C) (10/12/22 0417)  Pulse: 82 (10/12/22 0751)  Resp: 18 (10/12/22 0751)  BP: (!) 112/53 (10/12/22 0751)  SpO2: 99 % (10/12/22 0642)   Vital Signs (24h  Range):  Temp:  [98 °F (36.7 °C)-99.2 °F (37.3 °C)] 98.2 °F (36.8 °C)  Pulse:  [] 82  Resp:  [15-20] 18  SpO2:  [95 %-99 %] 99 %  BP: (106-141)/(45-78) 112/53     Weight: 63 kg (139 lb)  Body mass index is 23.86 kg/m².    Intake/Output Summary (Last 24 hours) at 10/12/2022 0911  Last data filed at 10/12/2022 0418  Gross per 24 hour   Intake 1000 ml   Output --   Net 1000 ml      Physical Exam  Vitals and nursing note reviewed.   Constitutional:       General: She is not in acute distress.     Appearance: She is well-developed.   HENT:      Head: Normocephalic and atraumatic.      Mouth/Throat:      Pharynx: No oropharyngeal exudate.   Eyes:      Conjunctiva/sclera: Conjunctivae normal.      Pupils: Pupils are equal, round, and reactive to light.   Cardiovascular:      Rate and Rhythm: Normal rate and regular rhythm.      Heart sounds: Normal heart sounds.   Pulmonary:      Effort: Pulmonary effort is normal. No respiratory distress.      Breath sounds: Rales (bibasliar) present. No wheezing.   Abdominal:      General: Bowel sounds are normal. There is distension.      Palpations: Abdomen is soft. There is no mass.      Tenderness: There is no abdominal tenderness.   Musculoskeletal:         General: No tenderness. Normal range of motion.      Cervical back: Normal range of motion and neck supple.      Right lower leg: No edema.      Left lower leg: No edema.   Lymphadenopathy:      Cervical: No cervical adenopathy.   Skin:     General: Skin is warm and dry.      Capillary Refill: Capillary refill takes less than 2 seconds.      Findings: No rash.   Neurological:      Mental Status: She is alert and oriented to person, place, and time.      Cranial Nerves: No cranial nerve deficit.      Sensory: No sensory deficit.      Coordination: Coordination normal.   Psychiatric:         Behavior: Behavior normal.         Thought Content: Thought content normal.         Judgment: Judgment normal.       Significant Labs:  All pertinent labs within the past 24 hours have been reviewed.  CBC:   Recent Labs   Lab 10/11/22  1434 10/12/22  0418   WBC 10.80 10.70   HGB 9.9* 8.2*   HCT 32.4* 27.7*   PLT 84* 76*     CMP:   Recent Labs   Lab 10/11/22  1610 10/12/22  0418   * 134*   K 4.3 3.9    105   CO2 21* 21*   GLU 74 99   BUN 15 19   CREATININE 0.8 0.8   CALCIUM 8.0* 7.5*   PROT 5.5*  --    ALBUMIN 3.1*  --    BILITOT 1.8*  --    ALKPHOS 59  --    AST 10  --    ALT 7*  --    ANIONGAP 9 8     Urine Studies:   Recent Labs   Lab 10/11/22  1800   COLORU Yellow   APPEARANCEUA Clear   PHUR 7.0   SPECGRAV 1.015   PROTEINUA 1+*   GLUCUA Negative   KETONESU 1+*   BILIRUBINUA Negative   OCCULTUA Negative   NITRITE Negative   LEUKOCYTESUR 2+*   RBCUA 4   WBCUA 14*   BACTERIA Rare   SQUAMEPITHEL 5   HYALINECASTS 0       Significant Imaging: I have reviewed all pertinent imaging results/findings within the past 24 hours.    Imaging Results              CT Cervical Spine Without Contrast (Final result)  Result time 10/11/22 22:49:13      Final result by Flo Scott MD (10/11/22 22:49:13)                   Impression:      1. No evidence of C1 fracture as suggested on same day CT head.  2. Multilevel degenerative change of the cervical spine as detailed above.  3. Additional findings as above.    Electronically signed by resident: David Enciso  Date:    10/11/2022  Time:    22:15    Electronically signed by: Flo Scott MD  Date:    10/11/2022  Time:    22:49               Narrative:    EXAMINATION:  CT CERVICAL SPINE WITHOUT CONTRAST    CLINICAL HISTORY:  Neck pain, chronic, degenerative changes on xray;    TECHNIQUE:  Low dose axial images, sagittal and coronal reformations were performed though the cervical spine.  Contrast was not administered.    COMPARISON:  Same day CT head    CTA stroke multiphase 06/19/2022    Cervical spine radiograph 10/08/2021    MRI cervical spine 03/13/2017    FINDINGS:  Alignment: Mild anterolisthesis C2 on  C3 and C7 on T1.  Mild retrolisthesis C3 on C4, and C6 on C7.    Vertebrae: Partial osseous fusion C5-C6.  Endplate irregularity/sclerosis C6-C7 similar to priors and likely degenerative.    Discs: Multilevel disc height loss most pronounced at C5-C6 and C6-C7. There are findings of chronic discitis-osteomyelitis at the C6-C7 level.    C1-2: Dens is intact.  Pre-dens space is maintained.    Skull base and craniocervical junction: Normal.    Degenerative findings:    C2-C3: Disc uncovering.  Bilateral uncovertebral and facet hypertrophy.  Mild bilateral neural foraminal narrowing.  No spinal canal stenosis.    C3-C4: Posterior disc osteophyte complex/disc uncovering.  Bilateral facet and uncovertebral hypertrophy.  Moderate left and mild right neural foraminal narrowing.  No spinal canal stenosis.    C4-C5: Posterior disc osteophyte complex.  Bilateral facet and uncovertebral hypertrophy.  Moderate bilateral neural foraminal narrowing.  Moderate spinal canal stenosis.    C5-C6: Posterior disc osteophyte complex.  Bilateral facet and uncovertebral hypertrophy.  Mild right and mild-to-moderate left neural foraminal narrowing.  Moderate spinal canal stenosis.    C6-C7: Posterior disc osteophyte complex/disc uncovering.  Bilateral facet and uncovertebral hypertrophy.  Mild to moderate bilateral neural foraminal narrowing.  Mild spinal canal stenosis.    C7-T1: Disc uncovering.  No neural foraminal narrowing or spinal canal stenosis.    Paraspinal muscles & soft tissues: Unremarkable.    Additional findings: Right pleural effusion.  Ground-glass opacities in the left lung apex.  Partially visualized thyroid appears enlarged.                                       CT Abdomen Pelvis With Contrast (Final result)  Result time 10/11/22 22:43:08      Final result by Axel Herrera MD (10/11/22 22:43:08)                   Impression:      1. Mild fat stranding around the distal sigmoid colon and rectum without associated wall  thickening.  Findings may be reflective of mild colitis/proctitis.  Diverticulitis also considered but felt less likely in the presence of presacral edema.  2. Small right pleural effusion with adjacent compressive atelectasis.  3. Nonspecific bilateral perinephric fat stranding with grossly normal renal enhancement.  Findings are nonspecific and could be related to medical renal disease though suggest correlation with urinalysis if there is concern for urinary tract infection.  4. Anasarca.  5. Additional findings as above.    Electronically signed by resident: David Enciso  Date:    10/11/2022  Time:    21:50    Electronically signed by: Axel Herrera MD  Date:    10/11/2022  Time:    22:43               Narrative:    EXAMINATION:  CT ABDOMEN PELVIS WITH CONTRAST    CLINICAL HISTORY:  Bowel obstruction suspected;Abdominal pain, acute, nonlocalized;    TECHNIQUE:  Axial images of the abdomen and pelvis were acquired  after the use of 75 cc [id number] IV contrast. Oral contrast not administered.  Coronal and sagittal reconstructions were also obtained    COMPARISON:  CT chest 01/23/2022    CT chest abdomen pelvis 04/11/2018    FINDINGS:  Heart: Left atrial enlargement.  No pericardial effusion. No significant calcific coronary atherosclerosis.    Lungs: Small right pleural effusion with adjacent compressive atelectasis.  Few scattered bands of subsegmental atelectasis.  No large consolidation.  No left pleural fluid.  No pneumothorax.    Liver: Normal in size and contour.  No focal hepatic lesion.    Gallbladder: No calcified gallstones.    Bile Ducts: Prominence of the extrahepatic common bile duct with normal in tapering at the ampulla similar to prior.    Pancreas: No mass or peripancreatic fat stranding.    Spleen: Unremarkable.    Stomach and duodenum: Small hiatal hernia, otherwise unremarkable.    Adrenals: Unremarkable.    Kidneys/ Ureters: Normal in size and location. Normal enhancement. No hydronephrosis or  nephrolithiasis. No ureteral dilatation.  Bilateral perinephric fat stranding.  Right renal simple cyst and bilateral hypodensities too small to characterize.    Bladder: No evidence of wall thickening.    Reproductive organs: Uterus enlarged with multiple calcified fibroids.    Bowel/Mesentery: Small bowel is normal in caliber with no evidence of obstruction.  Appendix not definitively visualized.  No inflammatory change at the expected location of the appendix.  Colon demonstrates no focal wall thickening.  Diverticulosis coli.  Mild fat stranding around the distal sigmoid colon and rectum.    Peritoneum: No intraperitoneal free air or fluid.    Lymph nodes: No retroperitoneal lymphadenopathy.    Vasculature: No aneurysm. Mild scattered calcific atherosclerosis.    Abdominal wall:  Diffuse body wall edema.    Bones: Degenerative change of the spine.  Wedge compression deformity T12 similar to CT chest 01/23/2022.  No acute fracture. No suspicious osseous lesions.                                       CT Head Without Contrast (Final result)  Result time 10/11/22 19:21:24      Final result by Flo Scott MD (10/11/22 19:21:24)                   Impression:      No acute intracranial abnormality.  Additional evaluation, as warranted.    Generalized cerebral volume loss, chronic microvascular ischemic change, and remote lacunar infarcts.    Probable age-indeterminate fracture of the C1 ring.  Dedicated noncontrast CT scan of the cervical spine may be obtained for further evaluation.    Electronically signed by resident: Jered Larson  Date:    10/11/2022  Time:    18:59    Electronically signed by: Flo Scott MD  Date:    10/11/2022  Time:    19:21               Narrative:    EXAMINATION:  CT HEAD WITHOUT CONTRAST    CLINICAL HISTORY:  Mental status change, unknown cause;    TECHNIQUE:  Low dose axial CT images obtained throughout the head without the use of intravenous contrast.  Axial, sagittal and coronal  reconstructions were performed.    COMPARISON:  CT head 06/20/2022.    MRI brain 06/19/2022.    FINDINGS:  Intracranial compartment:    Hypodensity within the posterior aspect of the right corona radiata not present on prior CT from 06/20/2022 likely reflects sequela of evolving infarct identified on MRI brain from 06/19/2022.    Remaining brain appears stable noting remote infarcts within the anterior limb of the left internal capsule, bilateral cerebellar hemispheres, and tatyana.  No parenchymal mass, hemorrhage, edema or major vascular distribution infarct.    No extra-axial blood or fluid collections.    Skull/extracranial contents (limited evaluation):    Hyperostosis frontalis interna.  There is a probable age-indeterminate fracture involving the C1 ring.  There is incompletely visualized.  Mastoid air cells and paranasal sinuses are essentially clear. Scattered atherosclerotic calcification about the skull base.  There are postoperative changes in the globes.                                       X-Ray Chest 1 View (Final result)  Result time 10/11/22 16:43:14      Final result by Ben Christianson MD (10/11/22 16:43:14)                   Impression:      1. Pulmonary findings may reflect interval edema noting stable configuration of the mediastinum.      Electronically signed by: Ben Christianson MD  Date:    10/11/2022  Time:    16:43               Narrative:    EXAMINATION:  XR CHEST 1 VIEW    CLINICAL HISTORY:  Shortness of breath    TECHNIQUE:  Single frontal view of the chest was performed.    COMPARISON:  01/29/2022    FINDINGS:  The cardiomediastinal silhouette is prominent, particularly involving the upper mediastinum, similar in appearance to the previous exam.  There is calcification of the aorta..  There is obscuration of the right costophrenic angle suggesting effusion..  The trachea is midline.  The lungs are symmetrically expanded bilaterally with coarse central hilar interstitial attenuation  suggesting edema..  There is bandlike atelectasis or scarring projected along the periphery of the right upper lung zone.  There is no pneumothorax.  The osseous structures are remarkable for degenerative changes..

## 2022-10-12 NOTE — ASSESSMENT & PLAN NOTE
Unwilling to get out of bed past couple of days, suspected weakness. At baseline uses a walker. Will consult pt/ot.

## 2022-10-12 NOTE — H&P
[facility] - Emergency Dept  [facility] Medicine  History & Physical    Patient Name: Selma Lux  MRN: [id number]  Patient Class: OP- Observation  Admission Date: 10/11/2022  Attending Physician: Karen Mcginnis MD   Primary Care Provider: Bhargav Hirsch MD         Patient information was obtained from patient, relative(s) and ER records.     Subjective:     Principal Problem:Acute encephalopathy    Chief Complaint:   Chief Complaint   Patient presents with    COVID-19 Concerns     Possibly exposed to COVID at [employer] on Sunday. Endorses fatigue. Hx of persistent PNA. Endorses intermittent SOB.     Shortness of Breath        HPI: Selma Lux is a 88 yo female with hx of CVA, SDH, chronic cryptogenic pna, 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 The daughter is at bedside to provide hx. She states on Sunday her mother went to [employer] and was exposed to COVID. After [employer] she went out to eat and when she came home she was nauseous and had an episode of vomiting, with associated abdominal pain. Since then she has been somnolent and unlike herself, unwilling to get out of bed. Per daughter, she is arousable and is able to hold a conversation. She denies fevers or chills, increased cough, SOB, changes in BM, dysuria, bleeding, lightheadedness, falls, or confusion.     In the ED, T 99.2 F, , RR 16, /63. 98% on room air. CBC with stable microcytic anemia. WBC 10.8K, left shift present. Na 132. T bili 1.8. Glucose 74. , Tn 0.019. Lactic 0.7. TSH wnl. VBG with mild hypoxia, O2 36. Flu and covid negative. UA infectious, culture pending. Blood cultures collected. CT abdomen pelvis concerning for mild proctitis/colitis, bilateral perinephric fat stranding, small R pleural effusion, and anasarca. CXR with possible interval edema. CTH and c-spine without acute process. Given 1g rocephin.        Past Medical History:   Diagnosis Date    Acute cystitis without hematuria 2/27/2020    Acute hypoxemic respiratory failure 4/11/2018    Acute respiratory failure with hypoxia 3/2/2020    Altered mental status     Anemia     Arthritis     Bilateral hand pain 3/14/2018    Branch retinal vein occlusion, left eye 2/20/2015    Chronic bilateral low back pain without sciatica 3/23/2017    Chronic renal failure in pediatric patient, stage 3 (moderate) 4/15/2018    Cognitive communication deficit 12/19/2017    Cystoid macular edema, left eye 2/20/2015    Cystoid macular edema, left eye 2/20/2015    Delirium 12/30/2021    DJD (degenerative joint disease) of cervical spine 5/15/2013    Enterococcal bacteremia     Fatty liver 8/26/2014    Goiter 4/9/2018    Hashimoto's disease     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    Hypertension     Hypertensive encephalopathy     IBS (irritable bowel syndrome) 6/21/2017    IGT (impaired glucose tolerance) 1/12/2016    Iron deficiency anemia secondary to inadequate dietary iron intake 6/24/2013    Joint pain     Keratoconjunctivitis sicca of both eyes not specified as Sjogren's 7/29/2016    Leiomyoma of uterus, unspecified 9/16/2014    Long QT interval 6/29/2016    Due to medication (plaquenil)     Macular edema 1/12/2015    Multinodular goiter 1/12/2016    Neuropathy 8/23/2017    Plaquenil causing adverse effect in therapeutic use 10/7/2016    Pneumococcal vaccine refused 8/17/2016    Pneumonia due to Streptococcus pneumoniae 4/5/2018    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/2015    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    s/p Right Total knee replacement 5/15/2013    Scoliosis of thoracic spine 5/15/2013    Small vessel disease, cerebrovascular 12/28/2017    Stroke     UTI (urinary tract infection) 12/29/2018    Vascular dementia 12/6/2017       Past Surgical History:   Procedure Laterality Date    BREAST CYST EXCISION       CATARACT EXTRACTION      COLONOSCOPY N/A 9/29/2015    Procedure: COLONOSCOPY;  Surgeon: FIDELINA Sanchez MD;  Location: [facility] ENDO (4TH FLR);  Service: Endoscopy;  Laterality: N/A;    EYE SURGERY      INJECTION OF ANESTHETIC AGENT AROUND NERVE Left 4/19/2021    Procedure: BLOCK, NERVE, FEMORAL AND OBTURATOR;  Surgeon: Shivam Gonzalez MD;  Location: [facility] PAIN MGT;  Service: Pain Management;  Laterality: Left;  consent needed    JOINT REPLACEMENT      right knee    KNEE SURGERY Left 12/31/2013    TKR    left parotidectomy      mixed tumor    ME EVAL,SWALLOW FUNCTION,CINE/VIDEO RECORD  6/5/2021         SALIVARY GLAND SURGERY      TONSILLECTOMY      UPPER GASTROINTESTINAL ENDOSCOPY         Review of patient's allergies indicates:  No Known Allergies    Current Facility-Administered Medications on File Prior to Encounter   Medication    onabotulinumtoxina injection 200 Units     Current Outpatient Medications on File Prior to Encounter   Medication Sig    acetaminophen (TYLENOL) 500 MG tablet Take 1 tablet (500 mg total) by mouth every 4 (four) hours as needed for Pain.    amLODIPine (NORVASC) 10 MG tablet TAKE 1 TABLET(10 MG) BY MOUTH EVERY DAY    apixaban (ELIQUIS) 2.5 mg Tab Take 1 tablet (2.5 mg total) by mouth 2 (two) times daily.    atorvastatin (LIPITOR) 40 MG tablet TAKE 1 TABLET(40 MG) BY MOUTH EVERY DAY    baclofen (LIORESAL) 10 MG tablet TAKE 1/2 TO 1 TABLET(5 TO 10 MG) BY MOUTH THREE TIMES DAILY AS NEEDED    bisacodyL (DULCOLAX) 10 mg Supp Place 1 suppository (10 mg total) rectally daily as needed (constipation).    ferrous sulfate 325 (65 FE) MG EC tablet Take 1 tablet (325 mg total) by mouth once daily.    furosemide (LASIX) 40 MG tablet Take 1 tablet (40 mg total) by mouth every other day. TAKE 1 TABLET(40 MG) BY MOUTH DAILY    gabapentin (NEURONTIN) 100 MG capsule Take 1 capsule (100 mg total) by mouth 2 (two) times daily. Take one 100mg capsule twice a day and one 300mg capsule at bedtime     gabapentin (NEURONTIN) 300 MG capsule Take 1 capsule (300 mg total) by mouth every evening.    gabapentin 5% baclofen 2% amitriptyline 2% topical cream Apply topically 3 (three) times daily.    hydrOXYchloroQUINE (PLAQUENIL) 200 mg tablet Take 1 tablet (200 mg total) by mouth 2 (two) times daily.    LIDOcaine (LIDODERM) 5 % Place 1 patch onto the skin once daily. Remove & Discard patch within 12 hours or as directed by MD    magnesium oxide (MAG-OX) 400 mg (241.3 mg magnesium) tablet Take 400 mg by mouth once daily.    melatonin 12 mg Tab Take 9 mg by mouth nightly.    montelukast (SINGULAIR) 10 mg tablet Take 10 mg by mouth once daily.    mycophenolate (CELLCEPT) 500 mg Tab TAKE 1 TABLET TWICE A DAY    omeprazole (PRILOSEC) 20 MG capsule TAKE 1 CAPSULE(20 MG) BY MOUTH EVERY DAY    polyethylene glycol (GLYCOLAX) 17 gram PwPk Take 17 g by mouth daily as needed.    predniSONE (DELTASONE) 10 MG tablet Take 1 tablet (10 mg total) by mouth once daily.    senna-docusate 8.6-50 mg (PERICOLACE) 8.6-50 mg per tablet Take 2 tablets by mouth 2 (two) times daily.    simethicone (MYLICON) 80 MG chewable tablet Take 1 tablet (80 mg total) by mouth 3 (three) times daily as needed for Flatulence.    thiamine 100 MG tablet Take 100 mg by mouth once daily.    traMADoL (ULTRAM) 50 mg tablet Take 1 tablet (50 mg total) by mouth every 6 (six) hours as needed for Pain.    vitamin D (VITAMIN D3) 1000 units Tab Take 2 tablets (2,000 Units total) by mouth once daily.    [DISCONTINUED] albuterol (PROVENTIL/VENTOLIN HFA) 90 mcg/actuation inhaler Inhale 2 puffs into the lungs every 6 (six) hours as needed for Wheezing. Rescue (Patient not taking: Reported on 6/15/2022)    [DISCONTINUED] albuterol-ipratropium (DUO-NEB) 2.5 mg-0.5 mg/3 mL nebulizer solution Take 3 mLs by nebulization every 4 (four) hours as needed for Wheezing. Rescue (Patient not taking: Reported on 6/15/2022)    [DISCONTINUED] calcium carbonate (TUMS) 200  mg calcium (500 mg) chewable tablet Take 2 tablets (1,000 mg total) by mouth once daily. (Patient not taking: No sig reported)    [DISCONTINUED] famotidine (PEPCID) 20 MG tablet Take 1 tablet (20 mg total) by mouth 2 (two) times daily. (Patient not taking: Reported on 6/15/2022)    [DISCONTINUED] lacosamide (VIMPAT) 10 mg/mL Soln oral solution Take 10 mLs (100 mg total) by mouth every 12 (twelve) hours. (Patient not taking: No sig reported)     Family History       Problem Relation (Age of Onset)    Breast cancer Maternal Grandmother    Cancer Father    Heart disease Mother    Hypertension Mother    Hyperthyroidism Mother, Other    Prostate cancer Father          Tobacco Use    Smoking status: Never    Smokeless tobacco: Never   Substance and Sexual Activity    Alcohol use: Yes     Alcohol/week: 0.0 standard drinks     Comment: very seldom     Drug use: No    Sexual activity: Not Currently     Comment: ,  age 52,      Review of Systems   Unable to perform ROS: Mental status change   Objective:     Vital Signs (Most Recent):  Temp: 98.2 °F (36.8 °C) (10/12/22 0417)  Pulse: 80 (10/12/22 0417)  Resp: 20 (10/12/22 0417)  BP: (!) 115/54 (10/12/22 0417)  SpO2: 96 % (10/12/22 0417)   Vital Signs (24h Range):  Temp:  [98 °F (36.7 °C)-99.2 °F (37.3 °C)] 98.2 °F (36.8 °C)  Pulse:  [] 80  Resp:  [15-20] 20  SpO2:  [95 %-99 %] 96 %  BP: (106-136)/(45-78) 115/54     Weight: 63 kg (139 lb)  Body mass index is 23.86 kg/m².    Physical Exam  Vitals and nursing note reviewed.   Constitutional:       General: She is not in acute distress.     Appearance: She is well-developed.      Comments: Patient is drowsy and unwilling to participate in exam. She is arousal to light touch.    HENT:      Head: Normocephalic and atraumatic.      Mouth/Throat:      Pharynx: No oropharyngeal exudate.   Eyes:      Conjunctiva/sclera: Conjunctivae normal.      Pupils: Pupils are equal, round, and reactive to light.    Cardiovascular:      Rate and Rhythm: Normal rate and regular rhythm.      Heart sounds: Normal heart sounds.   Pulmonary:      Effort: Pulmonary effort is normal. No respiratory distress.   Abdominal:      General: Bowel sounds are normal. There is no distension.      Palpations: Abdomen is soft.      Tenderness: There is abdominal tenderness.      Comments: LLQ tenderness on palpation.    Musculoskeletal:         General: No tenderness. Normal range of motion.      Cervical back: Normal range of motion and neck supple.   Lymphadenopathy:      Cervical: No cervical adenopathy.   Skin:     General: Skin is warm and dry.      Capillary Refill: Capillary refill takes less than 2 seconds.      Findings: No rash.   Neurological:      General: No focal deficit present.      Mental Status: She is alert.      Cranial Nerves: No cranial nerve deficit.      Sensory: No sensory deficit.      Coordination: Coordination normal.      Comments: Unwilling to answer orientation questions   Psychiatric:         Behavior: Behavior normal.         Thought Content: Thought content normal.         Judgment: Judgment normal.         CRANIAL NERVES     CN III, IV, VI   Pupils are equal, round, and reactive to light.     Significant Labs: All pertinent labs within the past 24 hours have been reviewed.  BMP:   Recent Labs   Lab 10/12/22  0418   GLU 99   *   K 3.9      CO2 21*   BUN 19   CREATININE 0.8   CALCIUM 7.5*     CBC:   Recent Labs   Lab 10/11/22  1434 10/12/22  0418   WBC 10.80 10.70   HGB 9.9* 8.2*   HCT 32.4* 27.7*   PLT 84* 76*     Lactic Acid:   Recent Labs   Lab 10/11/22  1434 10/11/22  1931   LACTATE 0.7 0.7     Urine Studies:   Recent Labs   Lab 10/11/22  1800   COLORU Yellow   APPEARANCEUA Clear   PHUR 7.0   SPECGRAV 1.015   PROTEINUA 1+*   GLUCUA Negative   KETONESU 1+*   BILIRUBINUA Negative   OCCULTUA Negative   NITRITE Negative   LEUKOCYTESUR 2+*   RBCUA 4   WBCUA 14*   BACTERIA Rare   SQUAMEPITHEL 5    HYALINECASTS 0       Significant Imaging: I have reviewed all pertinent imaging results/findings within the past 24 hours.    Imaging Results              CT Cervical Spine Without Contrast (Final result)  Result time 10/11/22 22:49:13      Final result by Flo Scott MD (10/11/22 22:49:13)                   Impression:      1. No evidence of C1 fracture as suggested on same day CT head.  2. Multilevel degenerative change of the cervical spine as detailed above.  3. Additional findings as above.    Electronically signed by resident: David Enciso  Date:    10/11/2022  Time:    22:15    Electronically signed by: Flo Scott MD  Date:    10/11/2022  Time:    22:49               Narrative:    EXAMINATION:  CT CERVICAL SPINE WITHOUT CONTRAST    CLINICAL HISTORY:  Neck pain, chronic, degenerative changes on xray;    TECHNIQUE:  Low dose axial images, sagittal and coronal reformations were performed though the cervical spine.  Contrast was not administered.    COMPARISON:  Same day CT head    CTA stroke multiphase 06/19/2022    Cervical spine radiograph 10/08/2021    MRI cervical spine 03/13/2017    FINDINGS:  Alignment: Mild anterolisthesis C2 on C3 and C7 on T1.  Mild retrolisthesis C3 on C4, and C6 on C7.    Vertebrae: Partial osseous fusion C5-C6.  Endplate irregularity/sclerosis C6-C7 similar to priors and likely degenerative.    Discs: Multilevel disc height loss most pronounced at C5-C6 and C6-C7. There are findings of chronic discitis-osteomyelitis at the C6-C7 level.    C1-2: Dens is intact.  Pre-dens space is maintained.    Skull base and craniocervical junction: Normal.    Degenerative findings:    C2-C3: Disc uncovering.  Bilateral uncovertebral and facet hypertrophy.  Mild bilateral neural foraminal narrowing.  No spinal canal stenosis.    C3-C4: Posterior disc osteophyte complex/disc uncovering.  Bilateral facet and uncovertebral hypertrophy.  Moderate left and mild right neural foraminal narrowing.  No  spinal canal stenosis.    C4-C5: Posterior disc osteophyte complex.  Bilateral facet and uncovertebral hypertrophy.  Moderate bilateral neural foraminal narrowing.  Moderate spinal canal stenosis.    C5-C6: Posterior disc osteophyte complex.  Bilateral facet and uncovertebral hypertrophy.  Mild right and mild-to-moderate left neural foraminal narrowing.  Moderate spinal canal stenosis.    C6-C7: Posterior disc osteophyte complex/disc uncovering.  Bilateral facet and uncovertebral hypertrophy.  Mild to moderate bilateral neural foraminal narrowing.  Mild spinal canal stenosis.    C7-T1: Disc uncovering.  No neural foraminal narrowing or spinal canal stenosis.    Paraspinal muscles & soft tissues: Unremarkable.    Additional findings: Right pleural effusion.  Ground-glass opacities in the left lung apex.  Partially visualized thyroid appears enlarged.                                       CT Abdomen Pelvis With Contrast (Final result)  Result time 10/11/22 22:43:08      Final result by Axel Herrera MD (10/11/22 22:43:08)                   Impression:      1. Mild fat stranding around the distal sigmoid colon and rectum without associated wall thickening.  Findings may be reflective of mild colitis/proctitis.  Diverticulitis also considered but felt less likely in the presence of presacral edema.  2. Small right pleural effusion with adjacent compressive atelectasis.  3. Nonspecific bilateral perinephric fat stranding with grossly normal renal enhancement.  Findings are nonspecific and could be related to medical renal disease though suggest correlation with urinalysis if there is concern for urinary tract infection.  4. Anasarca.  5. Additional findings as above.    Electronically signed by resident: David Enciso  Date:    10/11/2022  Time:    21:50    Electronically signed by: Axel Herrera MD  Date:    10/11/2022  Time:    22:43               Narrative:    EXAMINATION:  CT ABDOMEN PELVIS WITH  CONTRAST    CLINICAL HISTORY:  Bowel obstruction suspected;Abdominal pain, acute, nonlocalized;    TECHNIQUE:  Axial images of the abdomen and pelvis were acquired  after the use of 75 cc [id number] IV contrast. Oral contrast not administered.  Coronal and sagittal reconstructions were also obtained    COMPARISON:  CT chest 01/23/2022    CT chest abdomen pelvis 04/11/2018    FINDINGS:  Heart: Left atrial enlargement.  No pericardial effusion. No significant calcific coronary atherosclerosis.    Lungs: Small right pleural effusion with adjacent compressive atelectasis.  Few scattered bands of subsegmental atelectasis.  No large consolidation.  No left pleural fluid.  No pneumothorax.    Liver: Normal in size and contour.  No focal hepatic lesion.    Gallbladder: No calcified gallstones.    Bile Ducts: Prominence of the extrahepatic common bile duct with normal in tapering at the ampulla similar to prior.    Pancreas: No mass or peripancreatic fat stranding.    Spleen: Unremarkable.    Stomach and duodenum: Small hiatal hernia, otherwise unremarkable.    Adrenals: Unremarkable.    Kidneys/ Ureters: Normal in size and location. Normal enhancement. No hydronephrosis or nephrolithiasis. No ureteral dilatation.  Bilateral perinephric fat stranding.  Right renal simple cyst and bilateral hypodensities too small to characterize.    Bladder: No evidence of wall thickening.    Reproductive organs: Uterus enlarged with multiple calcified fibroids.    Bowel/Mesentery: Small bowel is normal in caliber with no evidence of obstruction.  Appendix not definitively visualized.  No inflammatory change at the expected location of the appendix.  Colon demonstrates no focal wall thickening.  Diverticulosis coli.  Mild fat stranding around the distal sigmoid colon and rectum.    Peritoneum: No intraperitoneal free air or fluid.    Lymph nodes: No retroperitoneal lymphadenopathy.    Vasculature: No aneurysm. Mild scattered calcific  atherosclerosis.    Abdominal wall:  Diffuse body wall edema.    Bones: Degenerative change of the spine.  Wedge compression deformity T12 similar to CT chest 01/23/2022.  No acute fracture. No suspicious osseous lesions.                                       CT Head Without Contrast (Final result)  Result time 10/11/22 19:21:24      Final result by Flo Scott MD (10/11/22 19:21:24)                   Impression:      No acute intracranial abnormality.  Additional evaluation, as warranted.    Generalized cerebral volume loss, chronic microvascular ischemic change, and remote lacunar infarcts.    Probable age-indeterminate fracture of the C1 ring.  Dedicated noncontrast CT scan of the cervical spine may be obtained for further evaluation.    Electronically signed by resident: Jered Larson  Date:    10/11/2022  Time:    18:59    Electronically signed by: Flo Scott MD  Date:    10/11/2022  Time:    19:21               Narrative:    EXAMINATION:  CT HEAD WITHOUT CONTRAST    CLINICAL HISTORY:  Mental status change, unknown cause;    TECHNIQUE:  Low dose axial CT images obtained throughout the head without the use of intravenous contrast.  Axial, sagittal and coronal reconstructions were performed.    COMPARISON:  CT head 06/20/2022.    MRI brain 06/19/2022.    FINDINGS:  Intracranial compartment:    Hypodensity within the posterior aspect of the right corona radiata not present on prior CT from 06/20/2022 likely reflects sequela of evolving infarct identified on MRI brain from 06/19/2022.    Remaining brain appears stable noting remote infarcts within the anterior limb of the left internal capsule, bilateral cerebellar hemispheres, and tatyana.  No parenchymal mass, hemorrhage, edema or major vascular distribution infarct.    No extra-axial blood or fluid collections.    Skull/extracranial contents (limited evaluation):    Hyperostosis frontalis interna.  There is a probable age-indeterminate fracture involving the C1  ring.  There is incompletely visualized.  Mastoid air cells and paranasal sinuses are essentially clear. Scattered atherosclerotic calcification about the skull base.  There are postoperative changes in the globes.                                       X-Ray Chest 1 View (Final result)  Result time 10/11/22 16:43:14      Final result by Ben Christianson MD (10/11/22 16:43:14)                   Impression:      1. Pulmonary findings may reflect interval edema noting stable configuration of the mediastinum.      Electronically signed by: Ben Christianson MD  Date:    10/11/2022  Time:    16:43               Narrative:    EXAMINATION:  XR CHEST 1 VIEW    CLINICAL HISTORY:  Shortness of breath    TECHNIQUE:  Single frontal view of the chest was performed.    COMPARISON:  01/29/2022    FINDINGS:  The cardiomediastinal silhouette is prominent, particularly involving the upper mediastinum, similar in appearance to the previous exam.  There is calcification of the aorta..  There is obscuration of the right costophrenic angle suggesting effusion..  The trachea is midline.  The lungs are symmetrically expanded bilaterally with coarse central hilar interstitial attenuation suggesting edema..  There is bandlike atelectasis or scarring projected along the periphery of the right upper lung zone.  There is no pneumothorax.  The osseous structures are remarkable for degenerative changes..                                        Assessment/Plan:     * Acute encephalopathy  Colitis   Acute cystitis     Lethargy insetting of abdominal pain, vomiting. CTH negative. Flu and covid neg. UA infectious. CT concerning for bilateral perinephric fat stranding and mild proctitis/colitis. AFVSS. WBC 10K, left shift present.   - Given 1g CTX in ED   - will change to cipro/flagyl to include colitis coverage.   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 At baseline uses a walker. Will consult pt/ot.       Chronic pain  Continue prn tramadol, baclofen, gabapentin       Pulmonary hypertension due to interstitial lung disease  Results for orders placed during the hospital encounter of 06/19/22    Echo    Interpretation Summary  · The left ventricle is normal in size with normal systolic function.  · The estimated ejection fraction is 55%.  · Normal left ventricular diastolic function.  · Mild-to-moderate mitral regurgitation.  · Normal right ventricular size with normal right ventricular systolic function.  · Trivial posterolateral pericardial effusion.        Cryptogenic organizing pneumonia  Stable on room air  No acute findings, continue prednisone 10 daily         Thrombocytopenia  PLT 84      AF (paroxysmal atrial fibrillation)  Patient with Paroxysmal (<7 days) atrial fibrillation which is controlled currently with no rate or rhythm control agents. . Patient is currently in sinus rhythm.[id number] Score: 6.. Anticoagulation indicated. Anticoagulation done with eliquis.        Vascular dementia  - mentation baseline per daughter       Hypertension, essential  - BP soft. Will hold amlodipine. Gentle IVF.  - continue lasix every other day, monitor bmp closely       Iron deficiency anemia secondary to inadequate dietary iron intake  Patient's anemia is currently controlled.   Current CBC reviewed-   Lab Results   Component Value Date    HGB 8.2 (L) 10/12/2022    HCT 27.7 (L) 10/12/2022     Monitor serial CBC and transfuse if patient becomes hemodynamically unstable, symptomatic  or H/H drops below 7/21.   - continue iron           VTE Risk Mitigation (From admission, onward)         Ordered     apixaban tablet 2.5 mg  2 times daily         10/12/22 0033                   Kennedi Ambrocio PA-C  Department of Hospital Medicine   [facility] Dept

## 2022-10-13 ENCOUNTER — TELEPHONE (OUTPATIENT)
Dept: PAIN MEDICINE | Facility: CLINIC | Age: 87
End: 2022-10-13
Payer: MEDICARE

## 2022-10-13 VITALS
TEMPERATURE: 97 F | RESPIRATION RATE: 17 BRPM | SYSTOLIC BLOOD PRESSURE: 137 MMHG | WEIGHT: 135.13 LBS | HEIGHT: 64 IN | HEART RATE: 84 BPM | BODY MASS INDEX: 23.07 KG/M2 | DIASTOLIC BLOOD PRESSURE: 61 MMHG | OXYGEN SATURATION: 98 %

## 2022-10-13 LAB
ANION GAP SERPL CALC-SCNC: 11 MMOL/L (ref 8–16)
ANISOCYTOSIS BLD QL SMEAR: SLIGHT
BACTERIA UR CULT: NO GROWTH
BASOPHILS # BLD AUTO: 0.01 K/UL (ref 0–0.2)
BASOPHILS NFR BLD: 0.1 % (ref 0–1.9)
BUN SERPL-MCNC: 16 MG/DL (ref 8–23)
CALCIUM SERPL-MCNC: 8 MG/DL (ref 8.7–10.5)
CHLORIDE SERPL-SCNC: 107 MMOL/L (ref 95–110)
CO2 SERPL-SCNC: 18 MMOL/L (ref 23–29)
CREAT SERPL-MCNC: 0.8 MG/DL (ref 0.5–1.4)
DACRYOCYTES BLD QL SMEAR: ABNORMAL
DIFFERENTIAL METHOD: ABNORMAL
EOSINOPHIL # BLD AUTO: 0.1 K/UL (ref 0–0.5)
EOSINOPHIL NFR BLD: 1.3 % (ref 0–8)
ERYTHROCYTE [DISTWIDTH] IN BLOOD BY AUTOMATED COUNT: 18.3 % (ref 11.5–14.5)
EST. GFR  (NO RACE VARIABLE): >60 ML/MIN/1.73 M^2
FERRITIN SERPL-MCNC: 248 NG/ML (ref 20–300)
GLUCOSE SERPL-MCNC: 95 MG/DL (ref 70–110)
HCT VFR BLD AUTO: 28.2 % (ref 37–48.5)
HGB BLD-MCNC: 8.6 G/DL (ref 12–16)
HYPOCHROMIA BLD QL SMEAR: ABNORMAL
IMM GRANULOCYTES # BLD AUTO: 0.06 K/UL (ref 0–0.04)
IMM GRANULOCYTES NFR BLD AUTO: 0.7 % (ref 0–0.5)
IRON SERPL-MCNC: 14 UG/DL (ref 30–160)
LYMPHOCYTES # BLD AUTO: 1.5 K/UL (ref 1–4.8)
LYMPHOCYTES NFR BLD: 16.7 % (ref 18–48)
MCH RBC QN AUTO: 24.8 PG (ref 27–31)
MCHC RBC AUTO-ENTMCNC: 30.5 G/DL (ref 32–36)
MCV RBC AUTO: 81 FL (ref 82–98)
MONOCYTES # BLD AUTO: 2.6 K/UL (ref 0.3–1)
MONOCYTES NFR BLD: 29.3 % (ref 4–15)
NEUTROPHILS # BLD AUTO: 4.6 K/UL (ref 1.8–7.7)
NEUTROPHILS NFR BLD: 51.9 % (ref 38–73)
NRBC BLD-RTO: 0 /100 WBC
OVALOCYTES BLD QL SMEAR: ABNORMAL
PLATELET # BLD AUTO: 85 K/UL (ref 150–450)
PLATELET BLD QL SMEAR: ABNORMAL
PMV BLD AUTO: 11.4 FL (ref 9.2–12.9)
POIKILOCYTOSIS BLD QL SMEAR: ABNORMAL
POTASSIUM SERPL-SCNC: 4.3 MMOL/L (ref 3.5–5.1)
RBC # BLD AUTO: 3.47 M/UL (ref 4–5.4)
SATURATED IRON: 5 % (ref 20–50)
SCHISTOCYTES BLD QL SMEAR: ABNORMAL
SCHISTOCYTES BLD QL SMEAR: PRESENT
SODIUM SERPL-SCNC: 136 MMOL/L (ref 136–145)
TOTAL IRON BINDING CAPACITY: 263 UG/DL (ref 250–450)
TRANSFERRIN SERPL-MCNC: 178 MG/DL (ref 200–375)
WBC # BLD AUTO: 8.75 K/UL (ref 3.9–12.7)

## 2022-10-13 PROCEDURE — 63600175 PHARM REV CODE 636 W HCPCS

## 2022-10-13 PROCEDURE — 25000003 PHARM REV CODE 250: Performed by: HOSPITALIST

## 2022-10-13 PROCEDURE — 99217 PR OBSERVATION CARE DISCHARGE: CPT | Mod: ,,,

## 2022-10-13 PROCEDURE — 25000003 PHARM REV CODE 250

## 2022-10-13 PROCEDURE — 36415 COLL VENOUS BLD VENIPUNCTURE: CPT

## 2022-10-13 PROCEDURE — 82728 ASSAY OF FERRITIN: CPT

## 2022-10-13 PROCEDURE — 84466 ASSAY OF TRANSFERRIN: CPT

## 2022-10-13 PROCEDURE — 80048 BASIC METABOLIC PNL TOTAL CA: CPT

## 2022-10-13 PROCEDURE — G0378 HOSPITAL OBSERVATION PER HR: HCPCS

## 2022-10-13 PROCEDURE — 99217 PR OBSERVATION CARE DISCHARGE: ICD-10-PCS | Mod: ,,,

## 2022-10-13 PROCEDURE — 85025 COMPLETE CBC W/AUTO DIFF WBC: CPT

## 2022-10-13 RX ORDER — CEFDINIR 300 MG/1
300 CAPSULE ORAL 2 TIMES DAILY
Qty: 6 CAPSULE | Refills: 0 | Status: SHIPPED | OUTPATIENT
Start: 2022-10-14 | End: 2022-10-17

## 2022-10-13 RX ORDER — METRONIDAZOLE 500 MG/1
500 TABLET ORAL EVERY 8 HOURS
Qty: 9 TABLET | Refills: 0 | Status: SHIPPED | OUTPATIENT
Start: 2022-10-14 | End: 2022-10-17

## 2022-10-13 RX ORDER — METRONIDAZOLE 500 MG/1
500 TABLET ORAL
Status: DISCONTINUED | OUTPATIENT
Start: 2022-10-13 | End: 2022-10-13 | Stop reason: HOSPADM

## 2022-10-13 RX ADMIN — PANTOPRAZOLE SODIUM 20 MG: 20 TABLET, DELAYED RELEASE ORAL at 12:10

## 2022-10-13 RX ADMIN — METRONIDAZOLE 500 MG: 500 TABLET ORAL at 12:10

## 2022-10-13 RX ADMIN — DICLOFENAC SODIUM 2 G: 10 GEL TOPICAL at 08:10

## 2022-10-13 RX ADMIN — FUROSEMIDE 40 MG: 40 TABLET ORAL at 08:10

## 2022-10-13 RX ADMIN — SENNOSIDES AND DOCUSATE SODIUM 2 TABLET: 50; 8.6 TABLET ORAL at 08:10

## 2022-10-13 RX ADMIN — BACLOFEN 5 MG: 5 TABLET ORAL at 09:10

## 2022-10-13 RX ADMIN — METRONIDAZOLE 500 MG: 500 TABLET ORAL at 08:10

## 2022-10-13 RX ADMIN — CHOLECALCIFEROL TAB 25 MCG (1000 UNIT) 2000 UNITS: 25 TAB at 08:10

## 2022-10-13 RX ADMIN — GABAPENTIN 100 MG: 100 CAPSULE ORAL at 08:10

## 2022-10-13 RX ADMIN — ATORVASTATIN CALCIUM 40 MG: 40 TABLET, FILM COATED ORAL at 08:10

## 2022-10-13 RX ADMIN — MONTELUKAST 10 MG: 10 TABLET, FILM COATED ORAL at 08:10

## 2022-10-13 RX ADMIN — Medication 400 MG: at 08:10

## 2022-10-13 RX ADMIN — GABAPENTIN 100 MG: 100 CAPSULE ORAL at 06:10

## 2022-10-13 RX ADMIN — PREDNISONE 10 MG: 10 TABLET ORAL at 08:10

## 2022-10-13 RX ADMIN — MYCOPHENOLATE MOFETIL 500 MG: 250 CAPSULE ORAL at 08:10

## 2022-10-13 RX ADMIN — CEFTRIAXONE 1 G: 1 INJECTION, SOLUTION INTRAVENOUS at 12:10

## 2022-10-13 RX ADMIN — Medication 100 MG: at 08:10

## 2022-10-13 RX ADMIN — METRONIDAZOLE 500 MG: 500 TABLET ORAL at 06:10

## 2022-10-13 RX ADMIN — APIXABAN 2.5 MG: 2.5 TABLET, FILM COATED ORAL at 08:10

## 2022-10-13 RX ADMIN — FERROUS SULFATE TAB 325 MG (65 MG ELEMENTAL FE) 1 EACH: 325 (65 FE) TAB at 08:10

## 2022-10-13 NOTE — HOSPITAL COURSE
Selma Lux is a 88 yo female admitted to hospital medicine for acute encephalopathy. CTH and spine without acute process. CBC without leukocytosis, showing anemia consistent with medical history. UA infectious, started on empiric antibiotic therapy IV ceftriaxone while cultures penidng. CT abdomen and pelvis concerning for proctitis/colitis, bilateral perinephric fat stranding, small R pleural effusion, and anasarca. Transitioned to cipro and flagyl for colitis coverage. Due to previous urine cultures growing organisms resistant to cipro, she was switched back to IV ceftriaxone. Returned to baseline this admission per caregiver. Patient endorses symptomatic improvement and is ready for discharge. Home health orders placed and accepted by [facility].

## 2022-10-13 NOTE — PLAN OF CARE
CM informed by bedside nurse that patient will need a stretcher transport to home.  [facility] order placed.   [facility] Facilitated Transportation Request [[id number]] (Order [id number])  Transfer  Date and Time: 10/13/2022  6:33 PM Department: [facility] Observation 11h Rel By: Charlette Benitez RN     Patient to discharge to:  [address] [place] [id number]       Charlette Benitez RN, CCRN-K, [facility]  Neuro-Critical Care   [phone]

## 2022-10-13 NOTE — PLAN OF CARE
[facility] - Observation 11H  Discharge Final Note    Primary Care Provider: Bhargav Hirsch MD    Expected Discharge Date: 10/13/2022    Final Discharge Note (most recent)       Final Note - 10/13/22 1609          Final Note    Assessment Type Final Discharge Note                     Important Message from Medicare             Contact [facility]        Next Steps: Follow up    Instructions: Home Health will contact to start care.             10/13/22 0827   Post-Acute Status   Post-Acute Authorization Home [facility]   Home Health Status Referrals Sent   Discharge Delays None known at this time   Discharge Plan   Discharge Plan A Home with family;Home Health     [facility] accepted. Orders sent. Pt to discharge home with family today. Any appointments arranged per AVS.    Dash Castillo, RN Case Manager  [phone]

## 2022-10-13 NOTE — ASSESSMENT & PLAN NOTE
Patient with Paroxysmal (<7 days) atrial fibrillation which is controlled currently with no rate or rhythm control agents. . Patient is currently in sinus rhythm.[id number] Score: 6.. Anticoagulation indicated. Anticoagulation done with eliquis.

## 2022-10-13 NOTE — ASSESSMENT & PLAN NOTE
Unwilling to get out of bed past couple of days, suspected weakness. At baseline uses a walker.  Home health orders sent

## 2022-10-13 NOTE — PLAN OF CARE
Problem: Infection  Goal: Absence of Infection Signs and Symptoms  Outcome: Ongoing, Progressing     Problem: Adjustment to Illness (Delirium)  Goal: Optimal Coping  Outcome: Ongoing, Progressing     Problem: Altered Behavior (Delirium)  Goal: Improved Behavioral Control  Outcome: Ongoing, Progressing     Problem: Attention and Thought Clarity Impairment (Delirium)  Goal: Improved Attention and Thought Clarity  Outcome: Ongoing, Progressing     Problem: Sleep Disturbance (Delirium)  Goal: Improved Sleep  Outcome: Ongoing, Progressing     Problem: Adult Inpatient Plan of Care  Goal: Plan of Care Review  Outcome: Ongoing, Progressing  Goal: Patient-Specific Goal (Individualized)  Outcome: Ongoing, Progressing  Goal: Absence of Hospital-Acquired Illness or Injury  Outcome: Ongoing, Progressing  Goal: Optimal Comfort and Wellbeing  Outcome: Ongoing, Progressing  Goal: Readiness for Transition of Care  Outcome: Ongoing, Progressing     Problem: Fall Injury Risk  Goal: Absence of Fall and Fall-Related Injury  Outcome: Ongoing, Progressing     Problem: Skin Injury Risk Increased  Goal: Skin Health and Integrity  Outcome: Ongoing, Progressing     Problem: Fluid and Electrolyte Imbalance (Acute Kidney Injury/Impairment)  Goal: Fluid and Electrolyte Balance  Outcome: Ongoing, Progressing     Problem: Oral Intake Inadequate (Acute Kidney Injury/Impairment)  Goal: Optimal Nutrition Intake  Outcome: Ongoing, Progressing     Problem: Renal Function Impairment (Acute Kidney Injury/Impairment)  Goal: Effective Renal Function  Outcome: Ongoing, Progressing     Problem: Impaired Wound Healing  Goal: Optimal Wound Healing  Outcome: Ongoing, Progressing     Pt lying supine in bed with eyes open. Able to voice needs to staff. Requires max to total assistance with ADLs but feeds self. Resp even and unlabored this AM with occasional couhging noted. Call light within reach. Personal sitter at bedside. Will cont to monitor.

## 2022-10-13 NOTE — ASSESSMENT & PLAN NOTE
Colitis   Acute cystitis     Lethargy insetting of abdominal pain, vomiting. CTH negative. Flu and covid neg. UA infectious. CT concerning for bilateral perinephric fat stranding and mild proctitis/colitis. AFVSS. WBC 10K, left shift present.   - Given 1g CTX in ED   - Changed to cipro/flagyl to include colitis coverage, however previous UCX resistant to cipro; transitioned to ceftriaxone and flagyl on 10/12  - gentle IVF  - lactic wnl  - urine and blood cultures - NGTD  - monitor for changes in bowel movements, may require stool studies

## 2022-10-13 NOTE — PLAN OF CARE
Problem: Infection  Goal: Absence of Infection Signs and Symptoms  10/13/2022 1628 by Eloise Rubio RN  Outcome: Met  10/13/2022 0939 by Eloise Rubio RN  Outcome: Ongoing, Progressing     Problem: Adjustment to Illness (Delirium)  Goal: Optimal Coping  10/13/2022 1628 by Eloise Rubio RN  Outcome: Met  10/13/2022 0939 by Eloise Rubio RN  Outcome: Ongoing, Progressing     Problem: Altered Behavior (Delirium)  Goal: Improved Behavioral Control  10/13/2022 1628 by Eloise Rubio RN  Outcome: Met  10/13/2022 0939 by Eloise Rubio RN  Outcome: Ongoing, Progressing     Problem: Attention and Thought Clarity Impairment (Delirium)  Goal: Improved Attention and Thought Clarity  10/13/2022 1628 by Eloise Rubio RN  Outcome: Met  10/13/2022 0939 by Eloise Rubio RN  Outcome: Ongoing, Progressing     Problem: Sleep Disturbance (Delirium)  Goal: Improved Sleep  10/13/2022 1628 by Eloise Rubio RN  Outcome: Met  10/13/2022 0939 by Eloise Rubio RN  Outcome: Ongoing, Progressing     Problem: Adult Inpatient Plan of Care  Goal: Plan of Care Review  10/13/2022 1628 by Eloise Rubio RN  Outcome: Met  10/13/2022 0939 by Eloise Rubio RN  Outcome: Ongoing, Progressing  Goal: Patient-Specific Goal (Individualized)  10/13/2022 1628 by Eloise Rubio RN  Outcome: Met  10/13/2022 0939 by Eloise Rubio RN  Outcome: Ongoing, Progressing  Goal: Absence of Hospital-Acquired Illness or Injury  10/13/2022 1628 by Eloise Rubio RN  Outcome: Met  10/13/2022 0939 by Eloise Rubio RN  Outcome: Ongoing, Progressing  Goal: Optimal Comfort and Wellbeing  10/13/2022 1628 by Eloise Rubio RN  Outcome: Met  10/13/2022 0939 by Eloise Rubio RN  Outcome: Ongoing, Progressing  Goal: Readiness for Transition of Care  10/13/2022 1628 by Eloise Rubio RN  Outcome: Met  10/13/2022 0939 by Eloise Rubio RN  Outcome: Ongoing, Progressing     Problem: Fall Injury Risk  Goal: Absence of Fall and Fall-Related Injury  10/13/2022 1628 by Eloise Rubio RN  Outcome:  Met  10/13/2022 0939 by Eloise Rubio RN  Outcome: Ongoing, Progressing     Problem: Skin Injury Risk Increased  Goal: Skin Health and Integrity  10/13/2022 1628 by Eloise Rubio RN  Outcome: Met  10/13/2022 0939 by Eloise Rubio RN  Outcome: Ongoing, Progressing     Problem: Fluid and Electrolyte Imbalance (Acute Kidney Injury/Impairment)  Goal: Fluid and Electrolyte Balance  10/13/2022 1628 by Eloise Rubio RN  Outcome: Met  10/13/2022 0939 by Eloise Rubio RN  Outcome: Ongoing, Progressing     Problem: Oral Intake Inadequate (Acute Kidney Injury/Impairment)  Goal: Optimal Nutrition Intake  10/13/2022 1628 by Eloise Rubio RN  Outcome: Met  10/13/2022 0939 by Eloise Rubio RN  Outcome: Ongoing, Progressing     Problem: Renal Function Impairment (Acute Kidney Injury/Impairment)  Goal: Effective Renal Function  10/13/2022 1628 by Eloise Rubio RN  Outcome: Met  10/13/2022 0939 by Eloise Rubio RN  Outcome: Ongoing, Progressing     Problem: Impaired Wound Healing  Goal: Optimal Wound Healing  10/13/2022 1628 by Eloise Rubio RN  Outcome: Met  10/13/2022 0939 by Eloise Rubio RN  Outcome: Ongoing, Progressing

## 2022-10-13 NOTE — ASSESSMENT & PLAN NOTE
- Unwilling to get out of bed past couple of days, suspected weakness  - At baseline uses a walker.  - Home health orders sent - accepted by [facility]

## 2022-10-13 NOTE — ASSESSMENT & PLAN NOTE
Anticoagulant long-term use    Patient with Paroxysmal (<7 days) atrial fibrillation which is controlled currently with no rate or rhythm control agents. . Patient is currently in sinus rhythm.[id number] Score: 6. Anticoagulation indicated. Anticoagulation done with eliquis.

## 2022-10-13 NOTE — ASSESSMENT & PLAN NOTE
Patient's anemia is currently controlled.   Current CBC reviewed-   Lab Results   Component Value Date    HGB 8.2 (L) 10/12/2022    HCT 27.7 (L) 10/12/2022     Monitor serial CBC and transfuse if patient becomes hemodynamically unstable, symptomatic or H/H drops below 7/21.  - iron studies ordered    - continue iron

## 2022-10-13 NOTE — ASSESSMENT & PLAN NOTE
Colitis   Acute cystitis     Lethargy insetting of abdominal pain, vomiting. CTH negative. Flu and covid neg. UA infectious. CT concerning for bilateral perinephric fat stranding and mild proctitis/colitis. AFVSS. WBC 10K, left shift present.   - Given 1g CTX in ED   - will change to cipro/flagyl to include colitis coverage.    - previous UCX resistant to cirpo; transitioned to ceftriaxone and flagyl 10/12  - gentle IVF  - lactic wnl  - follow urine and blood cultures  - monitor for changes in bowel movements, may require stool studies

## 2022-10-13 NOTE — ASSESSMENT & PLAN NOTE
Patient's anemia is currently controlled.   Current CBC reviewed-   Lab Results   Component Value Date    HGB 8.6 (L) 10/13/2022    HCT 28.2 (L) 10/13/2022     Monitor serial CBC and transfuse if patient becomes hemodynamically unstable, symptomatic or H/H drops below 7/21.  - iron studies consistent with iron deficiency anemia  - continue iron

## 2022-10-13 NOTE — PROGRESS NOTES
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 The daughter is at bedside to provide hx. She states on Sunday her mother went to Judaism and was exposed to COVID. After Judaism she went out to eat and when she came home she was nauseous and had an episode of vomiting, with associated abdominal pain. Since then she has been somnolent and unlike herself, unwilling to get out of bed. Per daughter, she is arousable and is able to hold a conversation. She denies fevers or chills, increased cough, SOB, changes in BM, dysuria, bleeding, lightheadedness, falls, or confusion.     In the ED, T 99.2 F, , RR 16, /63. 98% on room air. CBC with stable microcytic anemia. WBC 10.8K, left shift present. Na 132. T bili 1.8. Glucose 74. , Tn 0.019. Lactic 0.7. TSH wnl. VBG with mild hypoxia, O2 36. Flu and covid negative. UA infectious, culture pending. Blood cultures collected. CT abdomen pelvis concerning for mild proctitis/colitis, bilateral perinephric fat stranding, small R pleural effusion, and anasarca. CXR with possible interval edema. CTH and c-spine without acute process. Given 1g rocephin.       Overview/Hospital Course:  No notes on file    Interval History:  NAEON. VSS, afebrile.   Evaluated at bedside, caregiver also at bedside. Caregiver states patient is back to mental baseline today. Patient endorses feeling well today. States she overate food at her sisters house this  weekend which caused her to have 1 episode of nausea and vomiting before coming to the hospital. Denies any nausea, vomiting, abdominal pain or diarrhea today. UA infectious, started on IV ceftriaxone - denies any hematuria or dysuria previously.   Was transitioned to PO cipro and flagyl for colitis coverage however has prior urine cultures growing organisms resistant to cipro. Changed antibiotics to ceftriaxone and flagyl for broader coverage.   Noted to be coughing frequently on exam, when questioned states this is chronic. CXR ordered.  No new complaints at this time. All questions answered.     Review of Systems   Constitutional:  Positive for activity change (decreased 2/2 encephalopathy, resolved). Negative for chills and fever.   HENT:  Negative for trouble swallowing.    Eyes:  Negative for photophobia and visual disturbance.   Respiratory:  Positive for cough. Negative for chest tightness and shortness of breath.    Cardiovascular:  Negative for chest pain, palpitations and leg swelling.   Gastrointestinal:  Negative for abdominal pain, constipation, diarrhea, nausea and vomiting.   Genitourinary:  Negative for dysuria, frequency and hematuria.   Musculoskeletal:  Negative for back pain, gait problem and neck pain.   Skin:  Negative for rash and wound.   Neurological:  Negative for dizziness, syncope, speech difficulty, weakness and light-headedness.   Psychiatric/Behavioral:  Negative for agitation and confusion. The patient is not nervous/anxious.    Objective:     Vital Signs (Most Recent):  Temp: 98.2 °F (36.8 °C) (10/12/22 0417)  Pulse: 82 (10/12/22 0751)  Resp: 18 (10/12/22 0751)  BP: (!) 112/53 (10/12/22 0751)  SpO2: 99 % (10/12/22 0642)   Vital Signs (24h Range):  Temp:  [98 °F (36.7 °C)-99.2 °F (37.3 °C)] 98.2 °F (36.8 °C)  Pulse:  [] 82  Resp:  [15-20] 18  SpO2:  [95 %-99 %] 99 %  BP: (106-141)/(45-78) 112/53     Weight: 63 kg (139 lb)  Body mass index is 23.86 kg/m².    Intake/Output  Summary (Last 24 hours) at 10/12/2022 0911  Last data filed at 10/12/2022 0418  Gross per 24 hour   Intake 1000 ml   Output --   Net 1000 ml      Physical Exam  Vitals and nursing note reviewed.   Constitutional:       General: She is not in acute distress.     Appearance: She is well-developed.   HENT:      Head: Normocephalic and atraumatic.      Mouth/Throat:      Pharynx: No oropharyngeal exudate.   Eyes:      Conjunctiva/sclera: Conjunctivae normal.      Pupils: Pupils are equal, round, and reactive to light.   Cardiovascular:      Rate and Rhythm: Normal rate and regular rhythm.      Heart sounds: Normal heart sounds.   Pulmonary:      Effort: Pulmonary effort is normal. No respiratory distress.      Breath sounds: Rales (bibasliar) present. No wheezing.   Abdominal:      General: Bowel sounds are normal. There is distension.      Palpations: Abdomen is soft. There is no mass.      Tenderness: There is no abdominal tenderness.   Musculoskeletal:         General: No tenderness. Normal range of motion.      Cervical back: Normal range of motion and neck supple.      Right lower leg: No edema.      Left lower leg: No edema.   Lymphadenopathy:      Cervical: No cervical adenopathy.   Skin:     General: Skin is warm and dry.      Capillary Refill: Capillary refill takes less than 2 seconds.      Findings: No rash.   Neurological:      Mental Status: She is alert and oriented to person, place, and time.      Cranial Nerves: No cranial nerve deficit.      Sensory: No sensory deficit.      Coordination: Coordination normal.   Psychiatric:         Behavior: Behavior normal.         Thought Content: Thought content normal.         Judgment: Judgment normal.       Significant Labs: All pertinent labs within the past 24 hours have been reviewed.  CBC:   Recent Labs   Lab 10/11/22  1434 10/12/22  0418   WBC 10.80 10.70   HGB 9.9* 8.2*   HCT 32.4* 27.7*   PLT 84* 76*     CMP:   Recent Labs   Lab 10/11/22  1610  10/12/22  0418   * 134*   K 4.3 3.9    105   CO2 21* 21*   GLU 74 99   BUN 15 19   CREATININE 0.8 0.8   CALCIUM 8.0* 7.5*   PROT 5.5*  --    ALBUMIN 3.1*  --    BILITOT 1.8*  --    ALKPHOS 59  --    AST 10  --    ALT 7*  --    ANIONGAP 9 8     Urine Studies:   Recent Labs   Lab 10/11/22  1800   COLORU Yellow   APPEARANCEUA Clear   PHUR 7.0   SPECGRAV 1.015   PROTEINUA 1+*   GLUCUA Negative   KETONESU 1+*   BILIRUBINUA Negative   OCCULTUA Negative   NITRITE Negative   LEUKOCYTESUR 2+*   RBCUA 4   WBCUA 14*   BACTERIA Rare   SQUAMEPITHEL 5   HYALINECASTS 0       Significant Imaging: I have reviewed all pertinent imaging results/findings within the past 24 hours.    Imaging Results              CT Cervical Spine Without Contrast (Final result)  Result time 10/11/22 22:49:13      Final result by Flo Scott MD (10/11/22 22:49:13)                   Impression:      1. No evidence of C1 fracture as suggested on same day CT head.  2. Multilevel degenerative change of the cervical spine as detailed above.  3. Additional findings as above.    Electronically signed by resident: David Enciso  Date:    10/11/2022  Time:    22:15    Electronically signed by: Flo Scott MD  Date:    10/11/2022  Time:    22:49               Narrative:    EXAMINATION:  CT CERVICAL SPINE WITHOUT CONTRAST    CLINICAL HISTORY:  Neck pain, chronic, degenerative changes on xray;    TECHNIQUE:  Low dose axial images, sagittal and coronal reformations were performed though the cervical spine.  Contrast was not administered.    COMPARISON:  Same day CT head    CTA stroke multiphase 06/19/2022    Cervical spine radiograph 10/08/2021    MRI cervical spine 03/13/2017    FINDINGS:  Alignment: Mild anterolisthesis C2 on C3 and C7 on T1.  Mild retrolisthesis C3 on C4, and C6 on C7.    Vertebrae: Partial osseous fusion C5-C6.  Endplate irregularity/sclerosis C6-C7 similar to priors and likely degenerative.    Discs: Multilevel disc height loss most  pronounced at C5-C6 and C6-C7. There are findings of chronic discitis-osteomyelitis at the C6-C7 level.    C1-2: Dens is intact.  Pre-dens space is maintained.    Skull base and craniocervical junction: Normal.    Degenerative findings:    C2-C3: Disc uncovering.  Bilateral uncovertebral and facet hypertrophy.  Mild bilateral neural foraminal narrowing.  No spinal canal stenosis.    C3-C4: Posterior disc osteophyte complex/disc uncovering.  Bilateral facet and uncovertebral hypertrophy.  Moderate left and mild right neural foraminal narrowing.  No spinal canal stenosis.    C4-C5: Posterior disc osteophyte complex.  Bilateral facet and uncovertebral hypertrophy.  Moderate bilateral neural foraminal narrowing.  Moderate spinal canal stenosis.    C5-C6: Posterior disc osteophyte complex.  Bilateral facet and uncovertebral hypertrophy.  Mild right and mild-to-moderate left neural foraminal narrowing.  Moderate spinal canal stenosis.    C6-C7: Posterior disc osteophyte complex/disc uncovering.  Bilateral facet and uncovertebral hypertrophy.  Mild to moderate bilateral neural foraminal narrowing.  Mild spinal canal stenosis.    C7-T1: Disc uncovering.  No neural foraminal narrowing or spinal canal stenosis.    Paraspinal muscles & soft tissues: Unremarkable.    Additional findings: Right pleural effusion.  Ground-glass opacities in the left lung apex.  Partially visualized thyroid appears enlarged.                                       CT Abdomen Pelvis With Contrast (Final result)  Result time 10/11/22 22:43:08      Final result by Axel Herrera MD (10/11/22 22:43:08)                   Impression:      1. Mild fat stranding around the distal sigmoid colon and rectum without associated wall thickening.  Findings may be reflective of mild colitis/proctitis.  Diverticulitis also considered but felt less likely in the presence of presacral edema.  2. Small right pleural effusion with adjacent compressive  atelectasis.  3. Nonspecific bilateral perinephric fat stranding with grossly normal renal enhancement.  Findings are nonspecific and could be related to medical renal disease though suggest correlation with urinalysis if there is concern for urinary tract infection.  4. Anasarca.  5. Additional findings as above.    Electronically signed by resident: David Enciso  Date:    10/11/2022  Time:    21:50    Electronically signed by: Axel Herrera MD  Date:    10/11/2022  Time:    22:43               Narrative:    EXAMINATION:  CT ABDOMEN PELVIS WITH CONTRAST    CLINICAL HISTORY:  Bowel obstruction suspected;Abdominal pain, acute, nonlocalized;    TECHNIQUE:  Axial images of the abdomen and pelvis were acquired  after the use of 75 cc [id number] IV contrast. Oral contrast not administered.  Coronal and sagittal reconstructions were also obtained    COMPARISON:  CT chest 01/23/2022    CT chest abdomen pelvis 04/11/2018    FINDINGS:  Heart: Left atrial enlargement.  No pericardial effusion. No significant calcific coronary atherosclerosis.    Lungs: Small right pleural effusion with adjacent compressive atelectasis.  Few scattered bands of subsegmental atelectasis.  No large consolidation.  No left pleural fluid.  No pneumothorax.    Liver: Normal in size and contour.  No focal hepatic lesion.    Gallbladder: No calcified gallstones.    Bile Ducts: Prominence of the extrahepatic common bile duct with normal in tapering at the ampulla similar to prior.    Pancreas: No mass or peripancreatic fat stranding.    Spleen: Unremarkable.    Stomach and duodenum: Small hiatal hernia, otherwise unremarkable.    Adrenals: Unremarkable.    Kidneys/ Ureters: Normal in size and location. Normal enhancement. No hydronephrosis or nephrolithiasis. No ureteral dilatation.  Bilateral perinephric fat stranding.  Right renal simple cyst and bilateral hypodensities too small to characterize.    Bladder: No evidence of wall  thickening.    Reproductive organs: Uterus enlarged with multiple calcified fibroids.    Bowel/Mesentery: Small bowel is normal in caliber with no evidence of obstruction.  Appendix not definitively visualized.  No inflammatory change at the expected location of the appendix.  Colon demonstrates no focal wall thickening.  Diverticulosis coli.  Mild fat stranding around the distal sigmoid colon and rectum.    Peritoneum: No intraperitoneal free air or fluid.    Lymph nodes: No retroperitoneal lymphadenopathy.    Vasculature: No aneurysm. Mild scattered calcific atherosclerosis.    Abdominal wall:  Diffuse body wall edema.    Bones: Degenerative change of the spine.  Wedge compression deformity T12 similar to CT chest 01/23/2022.  No acute fracture. No suspicious osseous lesions.                                       CT Head Without Contrast (Final result)  Result time 10/11/22 19:21:24      Final result by Flo Scott MD (10/11/22 19:21:24)                   Impression:      No acute intracranial abnormality.  Additional evaluation, as warranted.    Generalized cerebral volume loss, chronic microvascular ischemic change, and remote lacunar infarcts.    Probable age-indeterminate fracture of the C1 ring.  Dedicated noncontrast CT scan of the cervical spine may be obtained for further evaluation.    Electronically signed by resident: Jered Larson  Date:    10/11/2022  Time:    18:59    Electronically signed by: Flo Scott MD  Date:    10/11/2022  Time:    19:21               Narrative:    EXAMINATION:  CT HEAD WITHOUT CONTRAST    CLINICAL HISTORY:  Mental status change, unknown cause;    TECHNIQUE:  Low dose axial CT images obtained throughout the head without the use of intravenous contrast.  Axial, sagittal and coronal reconstructions were performed.    COMPARISON:  CT head 06/20/2022.    MRI brain 06/19/2022.    FINDINGS:  Intracranial compartment:    Hypodensity within the posterior aspect of the right corona  radiata not present on prior CT from 06/20/2022 likely reflects sequela of evolving infarct identified on MRI brain from 06/19/2022.    Remaining brain appears stable noting remote infarcts within the anterior limb of the left internal capsule, bilateral cerebellar hemispheres, and tatyana.  No parenchymal mass, hemorrhage, edema or major vascular distribution infarct.    No extra-axial blood or fluid collections.    Skull/extracranial contents (limited evaluation):    Hyperostosis frontalis interna.  There is a probable age-indeterminate fracture involving the C1 ring.  There is incompletely visualized.  Mastoid air cells and paranasal sinuses are essentially clear. Scattered atherosclerotic calcification about the skull base.  There are postoperative changes in the globes.                                       X-Ray Chest 1 View (Final result)  Result time 10/11/22 16:43:14      Final result by Ben Christianson MD (10/11/22 16:43:14)                   Impression:      1. Pulmonary findings may reflect interval edema noting stable configuration of the mediastinum.      Electronically signed by: Ben Christianson MD  Date:    10/11/2022  Time:    16:43               Narrative:    EXAMINATION:  XR CHEST 1 VIEW    CLINICAL HISTORY:  Shortness of breath    TECHNIQUE:  Single frontal view of the chest was performed.    COMPARISON:  01/29/2022    FINDINGS:  The cardiomediastinal silhouette is prominent, particularly involving the upper mediastinum, similar in appearance to the previous exam.  There is calcification of the aorta..  There is obscuration of the right costophrenic angle suggesting effusion..  The trachea is midline.  The lungs are symmetrically expanded bilaterally with coarse central hilar interstitial attenuation suggesting edema..  There is bandlike atelectasis or scarring projected along the periphery of the right upper lung zone.  There is no pneumothorax.  The osseous structures are remarkable for  degenerative changes..                                      Assessment/Plan:      * Acute encephalopathy  Colitis   Acute cystitis     Lethargy insetting of abdominal pain, vomiting. CTH negative. Flu and covid neg. UA infectious. CT concerning for bilateral perinephric fat stranding and mild proctitis/colitis. AFVSS. WBC 10K, left shift present.   - Given 1g CTX in ED   - will change to cipro/flagyl to include colitis coverage.    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 At baseline uses a walker.  Home health orders sent     Chronic pain  Continue prn tramadol, baclofen, gabapentin     Pulmonary hypertension due to interstitial lung disease  Results for orders placed during the hospital encounter of 06/19/22    Echo    Interpretation Summary  · The left ventricle is normal in size with normal systolic function.  · The estimated ejection fraction is 55%.  · Normal left ventricular diastolic function.  · Mild-to-moderate mitral regurgitation.  · Normal right ventricular size with normal right ventricular systolic function.  · Trivial posterolateral pericardial effusion.    Cryptogenic organizing pneumonia  Stable on room air  No acute findings, continue prednisone 10 daily     Thrombocytopenia  PLT 84    AF (paroxysmal atrial fibrillation)  Patient with Paroxysmal (<7 days)  atrial fibrillation which is controlled currently with no rate or rhythm control agents. . Patient is currently in sinus rhythm.[id number] Score: 6.. Anticoagulation indicated. Anticoagulation done with eliquis.    Vascular dementia  - mentation baseline per daughter     Hypertension, essential  - BP soft. Will hold amlodipine. Gentle IVF.  - continue lasix every other day, monitor bmp closely       Iron deficiency anemia secondary to inadequate dietary iron intake  Patient's anemia is currently controlled.   Current CBC reviewed-   Lab Results   Component Value Date    HGB 8.2 (L) 10/12/2022    HCT 27.7 (L) 10/12/2022     Monitor serial CBC and transfuse if patient becomes hemodynamically unstable, symptomatic or H/H drops below 7/21.  - iron studies ordered    - continue iron      VTE Risk Mitigation (From admission, onward)         Ordered     apixaban tablet 2.5 mg  2 times daily         10/12/22 0033                Discharge Planning   LETICIA: 10/13/2022     Code Status: Full Code   Is the patient medically ready for discharge?:     Reason for patient still in hospital (select all that apply): Patient trending condition and Treatment  Discharge Plan A: Home with family, Home Health   Discharge Delays: None known at this time    Candi Forman PA-C  Department of Hospital Medicine   [facility]

## 2022-10-13 NOTE — ASSESSMENT & PLAN NOTE
- BP soft. Held amlodipine. Gentle IVF.  - continue lasix every other day, monitor bmp closely   - Cr stable at 0.8

## 2022-10-13 NOTE — SUBJECTIVE & OBJECTIVE
Review of Systems   Constitutional:  Positive for activity change (decreased 2/2 encephalopathy, resolved). Negative for chills and fever.   HENT:  Negative for trouble swallowing.    Eyes:  Negative for photophobia and visual disturbance.   Respiratory:  Positive for cough. Negative for chest tightness and shortness of breath.    Cardiovascular:  Negative for chest pain, palpitations and leg swelling.   Gastrointestinal:  Negative for abdominal pain, constipation, diarrhea, nausea and vomiting.   Genitourinary:  Negative for dysuria, frequency and hematuria.   Musculoskeletal:  Negative for back pain, gait problem and neck pain.   Skin:  Negative for rash and wound.   Neurological:  Negative for dizziness, syncope, speech difficulty, weakness and light-headedness.   Psychiatric/Behavioral:  Negative for agitation and confusion. The patient is not nervous/anxious.    Objective:     Vital Signs (Most Recent):  Temp: 96.5 °F (35.8 °C) (10/13/22 1154)  Pulse: 81 (10/13/22 1154)  Resp: 18 (10/13/22 1154)  BP: 130/62 (10/13/22 1154)  SpO2: 97 % (10/13/22 1154) Vital Signs (24h Range):  Temp:  [96.5 °F (35.8 °C)-98.3 °F (36.8 °C)] 96.5 °F (35.8 °C)  Pulse:  [80-90] 81  Resp:  [18-22] 18  SpO2:  [94 %-98 %] 97 %  BP: (127-149)/(58-86) 130/62     Weight: 61.3 kg (135 lb 2.3 oz)  Body mass index is 23.2 kg/m².  No intake or output data in the 24 hours ending 10/13/22 1414   Physical Exam  Vitals and nursing note reviewed.   Constitutional:       General: She is not in acute distress.     Appearance: She is well-developed.   HENT:      Head: Normocephalic and atraumatic.      Mouth/Throat:      Pharynx: No oropharyngeal exudate.   Eyes:      Conjunctiva/sclera: Conjunctivae normal.      Pupils: Pupils are equal, round, and reactive to light.   Cardiovascular:      Rate and Rhythm: Normal rate and regular rhythm.      Heart sounds: Normal heart sounds.   Pulmonary:      Effort: Pulmonary effort is normal. No respiratory  distress.      Breath sounds: Normal breath sounds. No wheezing.   Abdominal:      General: Bowel sounds are normal. There is distension.      Palpations: Abdomen is soft. There is no mass.      Tenderness: There is no abdominal tenderness.   Musculoskeletal:         General: No tenderness. Normal range of motion.      Cervical back: Normal range of motion and neck supple.      Right lower leg: No edema.      Left lower leg: No edema.   Lymphadenopathy:      Cervical: No cervical adenopathy.   Skin:     General: Skin is warm and dry.      Capillary Refill: Capillary refill takes less than 2 seconds.      Findings: No rash.   Neurological:      Mental Status: She is alert and oriented to person, place, and time.      Cranial Nerves: No cranial nerve deficit.      Sensory: No sensory deficit.      Coordination: Coordination normal.   Psychiatric:         Behavior: Behavior normal.         Thought Content: Thought content normal.         Judgment: Judgment normal.       Significant Labs:   Lab Results   Component Value Date    WBC 8.75 10/13/2022    HGB 8.6 (L) 10/13/2022    HCT 28.2 (L) 10/13/2022    MCV 81 (L) 10/13/2022    PLT 85 (L) 10/13/2022       CMP  Sodium   Date Value Ref Range Status   10/13/2022 136 136 - 145 mmol/L Final     Potassium   Date Value Ref Range Status   10/13/2022 4.3 3.5 - 5.1 mmol/L Final     Chloride   Date Value Ref Range Status   10/13/2022 107 95 - 110 mmol/L Final     CO2   Date Value Ref Range Status   10/13/2022 18 (L) 23 - 29 mmol/L Final     Glucose   Date Value Ref Range Status   10/13/2022 95 70 - 110 mg/dL Final     BUN   Date Value Ref Range Status   10/13/2022 16 8 - 23 mg/dL Final     Creatinine   Date Value Ref Range Status   10/13/2022 0.8 0.5 - 1.4 mg/dL Final     Calcium   Date Value Ref Range Status   10/13/2022 8.0 (L) 8.7 - 10.5 mg/dL Final     Total Protein   Date Value Ref Range Status   10/11/2022 5.5 (L) 6.0 - 8.4 g/dL Final     Albumin   Date Value Ref Range Status    10/11/2022 3.1 (L) 3.5 - 5.2 g/dL Final     Total Bilirubin   Date Value Ref Range Status   10/11/2022 1.8 (H) 0.1 - 1.0 mg/dL Final     Comment:     For infants and newborns, interpretation of results should be based  on gestational age, weight and in agreement with clinical  observations.    Premature Infant recommended reference ranges:  Up to 24 hours.............<8.0 mg/dL  Up to 48 hours............<12.0 mg/dL  3-5 days..................<15.0 mg/dL  6-29 days.................<15.0 mg/dL       Alkaline Phosphatase   Date Value Ref Range Status   10/11/2022 59 55 - 135 U/L Final     AST   Date Value Ref Range Status   10/11/2022 10 10 - 40 U/L Final     ALT   Date Value Ref Range Status   10/11/2022 7 (L) 10 - 44 U/L Final     Anion Gap   Date Value Ref Range Status   10/13/2022 11 8 - 16 mmol/L Final     eGFR if    Date Value Ref Range Status   06/23/2022 36.6 (A) >60 mL/min/1.73 m^2 Final     eGFR if non    Date Value Ref Range Status   06/23/2022 31.8 (A) >60 mL/min/1.73 m^2 Final     Comment:     Calculation used to obtain the estimated glomerular filtration  rate (eGFR) is the CKD-EPI equation.            Significant Imaging:  X-Ray Chest PA And Lateral  Narrative: EXAMINATION:  XR CHEST PA AND LATERAL    CLINICAL HISTORY:  new cough; hx of acute encephalopathy, UTI and colitis; concern for PNA;    TECHNIQUE:  PA and lateral views of the chest were performed.    COMPARISON:  10/11/2022 and CT chest dated 01/23/2022    FINDINGS:  Cardiac silhouette is at the upper limits of normal size.  Tortuous thoracic aorta with atherosclerotic calcification in the wall of the aortic arch.  Widening of the superior mediastinum, compatible with enlarged thyroid gland seen on the patient's recent CT scan.  Pulmonary vascularity does not appear congested.  Somewhat poor inspiration with overall volume loss of both lungs.  Mild elevation of the right hemidiaphragm.  Accentuation of the  interstitial pattern appears improved.  Mild airspace opacity remains at the right upper lobe.  In addition, the lateral view suggests some consolidation/atelectasis in the lower lobe region posteriorly.  Interval decrease in the amount of pleural fluid on the right.  No pneumothorax or other detrimental change.  Degenerative changes involving the thoracic spine and both shoulders.  Impression: Interval decrease in the degree of accentuation of the interstitial pattern.  Some airspace opacity remains at the right upper lobe.  Lateral view suggests some consolidation/atelectasis in the lower lobe region posteriorly.  Interval decrease in the amount of pleural fluid on the right.    This report was flagged in Epic as abnormal.    Electronically signed by: Marc Piedra MD  Date:    10/13/2022  Time:    11:35

## 2022-10-14 PROCEDURE — G0180 PR HOME HEALTH MD CERTIFICATION: ICD-10-PCS | Mod: ,,, | Performed by: INTERNAL MEDICINE

## 2022-10-14 PROCEDURE — G0180 MD CERTIFICATION HHA PATIENT: HCPCS | Mod: ,,, | Performed by: INTERNAL MEDICINE

## 2022-10-14 NOTE — ED PROVIDER NOTES
Encounter Date: 10/11/2022       History     Chief Complaint   Patient presents with    COVID-19 Concerns     Possibly exposed to COVID at [employer] on Sunday. Endorses fatigue. Hx of persistent PNA. Endorses intermittent SOB.     Shortness of Breath     Pt is an 88 yo F with PMH Of anemia, back pain, OA, UTI, subdural hematoma, CVA who presents with 2 of her daughters out of concern for altered mental status. Pt has been very somnolent and seems short of breath. Pt has been debilitated over the past several months with a subdural hematoma and CVA. She went to [employer] 3 days ago, in a wheelchair, and an hour after getting home she vomited. She had eaten a large lunch. Afterwards she was sleepy and has been excessively somnolent since then    The history is provided by a relative. The history is limited by the condition of the patient.   Review of patient's allergies indicates:  No Known Allergies  Past Medical History:   Diagnosis Date    Acute cystitis without hematuria 2/27/2020    Acute hypoxemic respiratory failure 4/11/2018    Acute respiratory failure with hypoxia 3/2/2020    Altered mental status     Anemia     Arthritis     Bilateral hand pain 3/14/2018    Branch retinal vein occlusion, left eye 2/20/2015    Chronic bilateral low back pain without sciatica 3/23/2017    Chronic renal failure in pediatric patient, stage 3 (moderate) 4/15/2018    Chronic systolic (congestive) heart failure 8/6/2021    Last Assessment & Plan:  Formatting of this note might be different from the original. 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     Hypertension     Hypertensive encephalopathy     IBS (irritable bowel syndrome) 6/21/2017    IGT (impaired glucose tolerance) 1/12/2016    Iron deficiency anemia secondary to inadequate dietary iron intake 6/24/2013    Joint pain     Keratoconjunctivitis sicca of both eyes not specified as Sjogren's 7/29/2016    Leiomyoma of uterus, unspecified 9/16/2014    Long QT interval 6/29/2016    Due to medication (plaquenil)     Macular edema 1/12/2015    Multinodular goiter 1/12/2016    Neuropathy 8/23/2017    Plaquenil causing adverse effect in therapeutic use 10/7/2016    Pneumococcal vaccine refused 8/17/2016    Pneumonia due to Streptococcus pneumoniae 4/5/2018    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/2015    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    s/p Right Total knee replacement 5/15/2013    Scoliosis of thoracic spine 5/15/2013    Small vessel disease, cerebrovascular 12/28/2017    Stroke     UTI (urinary tract infection) 12/29/2018    Vascular dementia 12/6/2017     Past Surgical History:   Procedure Laterality Date    BREAST CYST EXCISION      CATARACT EXTRACTION      COLONOSCOPY N/A 9/29/2015    Procedure: COLONOSCOPY;  Surgeon: FIDELINA Sanchez MD;  [facility]  Service: Endoscopy;  Laterality: N/A;    EYE SURGERY      INJECTION OF ANESTHETIC AGENT AROUND NERVE Left 4/19/2021    Procedure: BLOCK, NERVE, FEMORAL AND OBTURATOR;  Surgeon: Shivam Gonzalez MD;  Location: [facility];  Service: Pain Management;  Laterality: Left;  consent needed    JOINT REPLACEMENT      right knee    KNEE SURGERY Left 12/31/2013    TKR    left parotidectomy      mixed tumor    IN EVAL,SWALLOW FUNCTION,CINE/VIDEO RECORD  6/5/2021         SALIVARY GLAND SURGERY      TONSILLECTOMY      UPPER GASTROINTESTINAL ENDOSCOPY       Family History   Problem Relation Age of Onset    Hypertension Mother     Heart disease Mother     Hyperthyroidism Mother     Prostate cancer Father         prostate cancer     Cancer Father     Breast cancer Maternal Grandmother     Hyperthyroidism Other     Colon cancer Neg Hx      Social History     Tobacco Use    Smoking status: Never    Smokeless tobacco: Never   Substance Use Topics    Alcohol use: Yes     Alcohol/week: 0.0 standard drinks     Comment: very seldom     Drug use: No     Review of Systems   Unable to perform ROS: Mental status change     Physical Exam     Initial Vitals [10/11/22 1313]   BP Pulse Resp Temp SpO2   (!) 121/45 107 16 99.2 °F (37.3 °C) 95 %      MAP       --         Physical Exam    Nursing note and vitals reviewed.  Constitutional: She appears well-developed and well-nourished. She is not diaphoretic. No distress.   Somnolent but arousable but only answers 2-3 questions before just falling asleep again   HENT:   Head: Normocephalic and atraumatic.   Eyes: Conjunctivae are normal.   Neck: Neck supple.   Cardiovascular:  Normal rate and regular rhythm.           Pulmonary/Chest: No respiratory distress.   Abdominal: She exhibits no distension.   Musculoskeletal:      Cervical back: Neck supple.     Neurological: GCS score is 15. GCS eye subscore is 4. GCS verbal subscore is 5. GCS motor subscore is 6.   Somnolent but arousable   Skin: Skin is warm and dry.   Psychiatric:   Unable to assess based on mental status changes       ED Course   Procedures  Labs Reviewed   CBC W/ AUTO DIFFERENTIAL - Abnormal; Notable for the following components:       Result Value    Hemoglobin 9.9 (*)     Hematocrit 32.4 (*)     MCV 81 (*)     MCH 24.8 (*)     MCHC 30.6 (*)     RDW 18.5 (*)     Platelets 84 (*)     Immature Granulocytes 0.9 (*)     Immature Grans (Abs) 0.10 (*)     Lymph # 0.7 (*)     Mono # 2.8 (*)     Lymph % 6.6 (*)     Mono % 26.0 (*)     Platelet Estimate Decreased (*)     All other components within normal limits   COMPREHENSIVE METABOLIC PANEL - Abnormal; Notable for the following components:    Sodium 132 (*)     CO2 21 (*)     Calcium 8.0 (*)     Total  Protein 5.5 (*)     Albumin 3.1 (*)     Total Bilirubin 1.8 (*)     ALT 7 (*)     All other components within normal limits   URINALYSIS, REFLEX TO URINE CULTURE - Abnormal; Notable for the following components:    Protein, UA 1+ (*)     Ketones, UA 1+ (*)     Leukocytes, UA 2+ (*)     All other components within normal limits    Narrative:     Specimen Source->Urine   B-TYPE NATRIURETIC PEPTIDE - Abnormal; Notable for the following components:     (*)     All other components within normal limits   URINALYSIS MICROSCOPIC - Abnormal; Notable for the following components:    WBC, UA 14 (*)     All other components within normal limits    Narrative:     Specimen Source->Urine   BASIC METABOLIC PANEL - Abnormal; Notable for the following components:    Sodium 134 (*)     CO2 21 (*)     Calcium 7.5 (*)     All other components within normal limits   CBC W/ AUTO DIFFERENTIAL - Abnormal; Notable for the following components:    RBC 3.36 (*)     Hemoglobin 8.2 (*)     Hematocrit 27.7 (*)     MCH 24.4 (*)     MCHC 29.6 (*)     RDW 18.1 (*)     Platelets 76 (*)     Immature Granulocytes 1.0 (*)     Immature Grans (Abs) 0.11 (*)     Mono # 3.7 (*)     Lymph % 13.7 (*)     Mono % 34.7 (*)     All other components within normal limits   ISTAT PROCEDURE - Abnormal; Notable for the following components:    POC PO2 36 (*)     POC SATURATED O2 70 (*)     All other components within normal limits   INFLUENZA A & B BY MOLECULAR   LACTIC ACID, PLASMA   SARS-COV-2 RNA AMPLIFICATION, QUAL   TROPONIN I   TSH   LACTIC ACID, PLASMA   MAGNESIUM   MAGNESIUM    Narrative:     ADD ON MAG PER SHARA COATS PA-C ORDER# [id number] @  10/12/2022    10:52         ECG Results              EKG 12-lead (Final result)  Result time 10/12/22 09:45:16      Final result by Interface, Lab In [facility] (10/12/22 09:45:16)                   Narrative:    Test Reason : R53.81,    Vent. Rate : 089 BPM     Atrial Rate : 089 BPM     P-R Int : 148 ms           QRS Dur : 084 ms      QT Int : 398 ms       P-R-T Axes : 055 018 053 degrees     QTc Int : 484 ms    Sinus rhythm with occasional Premature ventricular complexes  Nonspecific T wave abnormality  Prolonged QT  Abnormal ECG  When compared with ECG of 21-JUN-2022 08:38,  QT has lengthened  Confirmed by PHIL AUGUSTIN MD ([id number]) on 10/12/2022 9:45:09 AM    Referred By: [email or web address]   SELF           Confirmed By:PHIL AUGUSTIN MD                                  Imaging Results              CT Cervical Spine Without Contrast (Final result)  Result time 10/11/22 22:49:13      Final result by Flo Scott MD (10/11/22 22:49:13)                   Impression:      1. No evidence of C1 fracture as suggested on same day CT head.  2. Multilevel degenerative change of the cervical spine as detailed above.  3. Additional findings as above.    Electronically signed by resident: David Enciso  Date:    10/11/2022  Time:    22:15    Electronically signed by: Flo Scott MD  Date:    10/11/2022  Time:    22:49               Narrative:    EXAMINATION:  CT CERVICAL SPINE WITHOUT CONTRAST    CLINICAL HISTORY:  Neck pain, chronic, degenerative changes on xray;    TECHNIQUE:  Low dose axial images, sagittal and coronal reformations were performed though the cervical spine.  Contrast was not administered.    COMPARISON:  Same day CT head    CTA stroke multiphase 06/19/2022    Cervical spine radiograph 10/08/2021    MRI cervical spine 03/13/2017    FINDINGS:  Alignment: Mild anterolisthesis C2 on C3 and C7 on T1.  Mild retrolisthesis C3 on C4, and C6 on C7.    Vertebrae: Partial osseous fusion C5-C6.  Endplate irregularity/sclerosis C6-C7 similar to priors and likely degenerative.    Discs: Multilevel disc height loss most pronounced at C5-C6 and C6-C7. There are findings of chronic discitis-osteomyelitis at the C6-C7 level.    C1-2: Dens is intact.  Pre-dens space is maintained.    Skull base and craniocervical junction:  Normal.    Degenerative findings:    C2-C3: Disc uncovering.  Bilateral uncovertebral and facet hypertrophy.  Mild bilateral neural foraminal narrowing.  No spinal canal stenosis.    C3-C4: Posterior disc osteophyte complex/disc uncovering.  Bilateral facet and uncovertebral hypertrophy.  Moderate left and mild right neural foraminal narrowing.  No spinal canal stenosis.    C4-C5: Posterior disc osteophyte complex.  Bilateral facet and uncovertebral hypertrophy.  Moderate bilateral neural foraminal narrowing.  Moderate spinal canal stenosis.    C5-C6: Posterior disc osteophyte complex.  Bilateral facet and uncovertebral hypertrophy.  Mild right and mild-to-moderate left neural foraminal narrowing.  Moderate spinal canal stenosis.    C6-C7: Posterior disc osteophyte complex/disc uncovering.  Bilateral facet and uncovertebral hypertrophy.  Mild to moderate bilateral neural foraminal narrowing.  Mild spinal canal stenosis.    C7-T1: Disc uncovering.  No neural foraminal narrowing or spinal canal stenosis.    Paraspinal muscles & soft tissues: Unremarkable.    Additional findings: Right pleural effusion.  Ground-glass opacities in the left lung apex.  Partially visualized thyroid appears enlarged.                                       CT Abdomen Pelvis With Contrast (Final result)  Result time 10/11/22 22:43:08      Final result by Axel Herrera MD (10/11/22 22:43:08)                   Impression:      1. Mild fat stranding around the distal sigmoid colon and rectum without associated wall thickening.  Findings may be reflective of mild colitis/proctitis.  Diverticulitis also considered but felt less likely in the presence of presacral edema.  2. Small right pleural effusion with adjacent compressive atelectasis.  3. Nonspecific bilateral perinephric fat stranding with grossly normal renal enhancement.  Findings are nonspecific and could be related to medical renal disease though suggest correlation with urinalysis  if there is concern for urinary tract infection.  4. Anasarca.  5. Additional findings as above.    Electronically signed by resident: David Enciso  Date:    10/11/2022  Time:    21:50    Electronically signed by: Axel Herrera MD  Date:    10/11/2022  Time:    22:43               Narrative:    EXAMINATION:  CT ABDOMEN PELVIS WITH CONTRAST    CLINICAL HISTORY:  Bowel obstruction suspected;Abdominal pain, acute, nonlocalized;    TECHNIQUE:  Axial images of the abdomen and pelvis were acquired  after the use of 75 cc [id number] IV contrast. Oral contrast not administered.  Coronal and sagittal reconstructions were also obtained    COMPARISON:  CT chest 01/23/2022    CT chest abdomen pelvis 04/11/2018    FINDINGS:  Heart: Left atrial enlargement.  No pericardial effusion. No significant calcific coronary atherosclerosis.    Lungs: Small right pleural effusion with adjacent compressive atelectasis.  Few scattered bands of subsegmental atelectasis.  No large consolidation.  No left pleural fluid.  No pneumothorax.    Liver: Normal in size and contour.  No focal hepatic lesion.    Gallbladder: No calcified gallstones.    Bile Ducts: Prominence of the extrahepatic common bile duct with normal in tapering at the ampulla similar to prior.    Pancreas: No mass or peripancreatic fat stranding.    Spleen: Unremarkable.    Stomach and duodenum: Small hiatal hernia, otherwise unremarkable.    Adrenals: Unremarkable.    Kidneys/ Ureters: Normal in size and location. Normal enhancement. No hydronephrosis or nephrolithiasis. No ureteral dilatation.  Bilateral perinephric fat stranding.  Right renal simple cyst and bilateral hypodensities too small to characterize.    Bladder: No evidence of wall thickening.    Reproductive organs: Uterus enlarged with multiple calcified fibroids.    Bowel/Mesentery: Small bowel is normal in caliber with no evidence of obstruction.  Appendix not definitively visualized.  No inflammatory change at the  expected location of the appendix.  Colon demonstrates no focal wall thickening.  Diverticulosis coli.  Mild fat stranding around the distal sigmoid colon and rectum.    Peritoneum: No intraperitoneal free air or fluid.    Lymph nodes: No retroperitoneal lymphadenopathy.    Vasculature: No aneurysm. Mild scattered calcific atherosclerosis.    Abdominal wall:  Diffuse body wall edema.    Bones: Degenerative change of the spine.  Wedge compression deformity T12 similar to CT chest 01/23/2022.  No acute fracture. No suspicious osseous lesions.                                       CT Head Without Contrast (Final result)  Result time 10/11/22 19:21:24      Final result by Flo Scott MD (10/11/22 19:21:24)                   Impression:      No acute intracranial abnormality.  Additional evaluation, as warranted.    Generalized cerebral volume loss, chronic microvascular ischemic change, and remote lacunar infarcts.    Probable age-indeterminate fracture of the C1 ring.  Dedicated noncontrast CT scan of the cervical spine may be obtained for further evaluation.    Electronically signed by resident: Jered Larson  Date:    10/11/2022  Time:    18:59    Electronically signed by: Flo Scott MD  Date:    10/11/2022  Time:    19:21               Narrative:    EXAMINATION:  CT HEAD WITHOUT CONTRAST    CLINICAL HISTORY:  Mental status change, unknown cause;    TECHNIQUE:  Low dose axial CT images obtained throughout the head without the use of intravenous contrast.  Axial, sagittal and coronal reconstructions were performed.    COMPARISON:  CT head 06/20/2022.    MRI brain 06/19/2022.    FINDINGS:  Intracranial compartment:    Hypodensity within the posterior aspect of the right corona radiata not present on prior CT from 06/20/2022 likely reflects sequela of evolving infarct identified on MRI brain from 06/19/2022.    Remaining brain appears stable noting remote infarcts within the anterior limb of the left internal  capsule, bilateral cerebellar hemispheres, and tatyana.  No parenchymal mass, hemorrhage, edema or major vascular distribution infarct.    No extra-axial blood or fluid collections.    Skull/extracranial contents (limited evaluation):    Hyperostosis frontalis interna.  There is a probable age-indeterminate fracture involving the C1 ring.  There is incompletely visualized.  Mastoid air cells and paranasal sinuses are essentially clear. Scattered atherosclerotic calcification about the skull base.  There are postoperative changes in the globes.                                       X-Ray Chest 1 View (Final result)  Result time 10/11/22 16:43:14      Final result by Ben Christianson MD (10/11/22 16:43:14)                   Impression:      1. Pulmonary findings may reflect interval edema noting stable configuration of the mediastinum.      Electronically signed by: Bne Christianson MD  Date:    10/11/2022  Time:    16:43               Narrative:    EXAMINATION:  XR CHEST 1 VIEW    CLINICAL HISTORY:  Shortness of breath    TECHNIQUE:  Single frontal view of the chest was performed.    COMPARISON:  01/29/2022    FINDINGS:  The cardiomediastinal silhouette is prominent, particularly involving the upper mediastinum, similar in appearance to the previous exam.  There is calcification of the aorta..  There is obscuration of the right costophrenic angle suggesting effusion..  The trachea is midline.  The lungs are symmetrically expanded bilaterally with coarse central hilar interstitial attenuation suggesting edema..  There is bandlike atelectasis or scarring projected along the periphery of the right upper lung zone.  There is no pneumothorax.  The osseous structures are remarkable for degenerative changes..                                       Medications   apixaban tablet 2.5 mg (2.5 mg Oral Given 10/13/22 0849)   atorvastatin tablet 40 mg (40 mg Oral Given 10/13/22 0849)   baclofen tablet 5 mg (0 mg Oral Hold 10/13/22  1500)   bisacodyL suppository 10 mg (has no administration in time range)   ferrous sulfate tablet 1 each (1 each Oral Given 10/13/22 0848)   furosemide tablet 40 mg (40 mg Oral Given 10/13/22 0848)   gabapentin capsule 100 mg (100 mg Oral Given 10/13/22 1800)   gabapentin capsule 300 mg (300 mg Oral Given 10/12/22 2059)   magnesium oxide tablet 400 mg (400 mg Oral Given 10/13/22 0849)   montelukast tablet 10 mg (10 mg Oral Given 10/13/22 0848)   mycophenolate capsule 500 mg (500 mg Oral Given 10/13/22 0849)   pantoprazole EC tablet 20 mg (20 mg Oral Given 10/13/22 1247)   predniSONE tablet 10 mg (10 mg Oral Given 10/13/22 0849)   senna-docusate 8.6-50 mg per tablet 2 tablet (2 tablets Oral Given 10/13/22 0849)   simethicone chewable tablet 80 mg (has no administration in time range)   thiamine tablet 100 mg (100 mg Oral Given 10/13/22 0849)   traMADoL tablet 50 mg (has no administration in time range)   vitamin D 1000 units tablet 2,000 Units (2,000 Units Oral Given 10/13/22 0849)   sodium chloride 0.9% flush 10 mL (has no administration in time range)   albuterol-ipratropium 2.5 mg-0.5 mg/3 mL nebulizer solution 3 mL (3 mLs Nebulization Given 10/12/22 1818)   melatonin tablet 9 mg (has no administration in time range)   ondansetron disintegrating tablet 8 mg (has no administration in time range)   acetaminophen tablet 650 mg (has no administration in time range)   aluminum-magnesium hydroxide-simethicone 200-200-20 mg/5 mL suspension 30 mL (has no administration in time range)   naloxone 0.4 mg/mL injection 0.02 mg (has no administration in time range)   glucose chewable tablet 16 g (has no administration in time range)   glucose chewable tablet 24 g (has no administration in time range)   glucagon (human recombinant) injection 1 mg (has no administration in time range)   dextrose 10% bolus 125 mL (has no administration in time range)   dextrose 10% bolus 250 mL (has no administration in time range)   cefTRIAXone  (ROCEPHIN) 1 g/50 mL D5W IVPB (0 g Intravenous Stopped 10/13/22 0123)   diclofenac sodium 1 % gel 2 g (2 g Topical (Top) Given 10/13/22 0848)   metroNIDAZOLE tablet 500 mg (500 mg Oral Given 10/13/22 1800)   iohexoL (OMNIPAQUE 350) injection 75 mL (75 mLs Intravenous Given 10/11/22 2129)   cefTRIAXone (ROCEPHIN) 1 g/50 mL D5W IVPB (0 g Intravenous Stopped 10/12/22 0150)   lactated ringers bolus 1,000 mL (0 mLs Intravenous Stopped 10/12/22 0418)     Medical Decision Making:   History:   Old Medical Records: I decided to obtain old medical records.  Old Records Summarized: records from clinic visits.  Differential Diagnosis:   Uti, electrolyte abnormality, encephalopathy, ICH, CVA, uremia, hypercarbia  Clinical Tests:   Lab Tests: Reviewed and Ordered  Radiological Study: Reviewed and Ordered  Medical Tests: Ordered and Reviewed  ED Management:  Work up in the ED unremarkable but given pt's AMS pt admitted to  for further work up           ED Course as of 10/13/22 2320   Tue Oct 11, 2022   1743 Added on troponin, bnp, VBG [GK]   1947 Pt now awake and alert, answering questions [GK]      ED Course User Index  [GK] Zari Ahumada MD                 Clinical Impression:   Final diagnoses:  [R06.02] Shortness of breath  [R53.81] Malaise        ED Disposition Condition    Observation                 Zari Ahumada MD  10/13/22 2320

## 2022-10-14 NOTE — NURSING
Discharged from facility @ this time via transportation services. Noted stable condition @ time of departure.

## 2022-10-16 LAB
BACTERIA BLD CULT: NORMAL
BACTERIA BLD CULT: NORMAL

## 2022-10-18 ENCOUNTER — OFFICE VISIT (OUTPATIENT)
Dept: PULMONOLOGY | Facility: CLINIC | Age: 87
End: 2022-10-18
Payer: MEDICARE

## 2022-10-18 VITALS
SYSTOLIC BLOOD PRESSURE: 130 MMHG | DIASTOLIC BLOOD PRESSURE: 60 MMHG | HEIGHT: 64 IN | HEART RATE: 54 BPM | OXYGEN SATURATION: 95 % | WEIGHT: 140 LBS | BODY MASS INDEX: 23.9 KG/M2

## 2022-10-18 DIAGNOSIS — J84.116 CRYPTOGENIC ORGANIZING PNEUMONIA: Primary | ICD-10-CM

## 2022-10-18 PROCEDURE — 99214 PR OFFICE/OUTPT VISIT, EST, LEVL IV, 30-39 MIN: ICD-10-PCS | Mod: S$PBB,GC,, | Performed by: INTERNAL MEDICINE

## 2022-10-18 PROCEDURE — 99215 OFFICE O/P EST HI 40 MIN: CPT | Mod: PBBFAC | Performed by: INTERNAL MEDICINE

## 2022-10-18 PROCEDURE — 99214 OFFICE O/P EST MOD 30 MIN: CPT | Mod: S$PBB,GC,, | Performed by: INTERNAL MEDICINE

## 2022-10-18 PROCEDURE — 99999 PR PBB SHADOW E&M-EST. PATIENT-LVL V: ICD-10-PCS | Mod: PBBFAC,,, | Performed by: INTERNAL MEDICINE

## 2022-10-18 PROCEDURE — 99999 PR PBB SHADOW E&M-EST. PATIENT-LVL V: CPT | Mod: PBBFAC,,, | Performed by: INTERNAL MEDICINE

## 2022-10-18 RX ORDER — ALBUTEROL SULFATE 0.83 MG/ML
SOLUTION RESPIRATORY (INHALATION)
COMMUNITY
Start: 2022-08-03 | End: 2023-01-01

## 2022-10-18 RX ORDER — DICLOFENAC SODIUM 10 MG/G
3 GEL TOPICAL
COMMUNITY
Start: 2022-08-03 | End: 2022-11-30

## 2022-10-18 NOTE — DISCHARGE SUMMARY
[facility] [address] [place] [address] Medicine  Discharge Summary      Patient Name: Selma Lux  MRN: [id number]  Patient Class: OP- Observation  Admission Date: 10/11/2022  Hospital Length of Stay: 0 days  Discharge Date and Time: 10/13/2022  8:42 PM  Attending Physician: Triston Marcano MD  Discharging Provider: Katerine Boyer PA-C  Primary Care Provider: Bhargav Hirsch MD  [facility] Medicine Team: [facility] [place] [facility] MED E Katerine Boyer PA-C    HPI:   Selma Lux is a 86 yo female with hx of CVA, SDH, chronic cryptogenic pna, 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 The daughter is at bedside to provide hx. She states on Sunday her mother went to [employer] and was exposed to COVID. After [employer] she went out to eat and when she came home she was nauseous and had an episode of vomiting, with associated abdominal pain. Since then she has been somnolent and unlike herself, unwilling to get out of bed. Per daughter, she is arousable and is able to hold a conversation. She denies fevers or chills, increased cough, SOB, changes in BM, dysuria, bleeding, lightheadedness, falls, or confusion.     In the ED, T 99.2 F, , RR 16, /63. 98% on room air. CBC with stable microcytic anemia. WBC 10.8K, left shift present. Na 132. T bili 1.8. Glucose 74. , Tn 0.019. Lactic 0.7. TSH wnl. VBG with mild hypoxia, O2 36. Flu and covid negative. UA infectious, culture pending. Blood cultures collected. CT abdomen pelvis concerning for mild proctitis/colitis, bilateral perinephric fat stranding, small R pleural effusion, and anasarca. CXR with possible interval edema. CTH and c-spine without acute process. Given 1g rocephin.       * No surgery found *      Hospital Course:   Selma Lux is a 86 yo female admitted to hospital medicine for acute encephalopathy. CTH and spine without acute process. CBC  without leukocytosis, showing anemia consistent with medical history. UA infectious, started on empiric antibiotic therapy IV ceftriaxone while cultures penidng. CT abdomen and pelvis concerning for proctitis/colitis, bilateral perinephric fat stranding, small R pleural effusion, and anasarca. Transitioned to cipro and flagyl for colitis coverage. Due to previous urine cultures growing organisms resistant to cipro, she was switched back to IV ceftriaxone. Returned to baseline this admission per caregiver. Patient endorses symptomatic improvement and is ready for discharge. Home health orders placed and accepted by [facility].        Goals of Care Treatment Preferences:  Code Status: Full Code    Living Will: Yes            Consults:   Consults (From admission, onward)        Status Ordering Provider     IP consult to case management  Once        Provider:  (Not yet assigned)    Completed RIO CRAFT          * Acute encephalopathy  Colitis   Acute cystitis     Lethargy insetting of abdominal pain, vomiting. CTH negative. Flu and covid neg. UA infectious. CT concerning for bilateral perinephric fat stranding and mild proctitis/colitis. AFVSS. WBC 10K, left shift present.   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  · The estimated ejection fraction is 55%.  · Normal left ventricular diastolic function.  · Mild-to-moderate mitral regurgitation.  · Normal right ventricular size with normal right ventricular systolic function.  · Trivial posterolateral pericardial effusion.    Cryptogenic organizing pneumonia  Stable on room air  No acute findings, continue prednisone 10 daily     Thrombocytopenia  PLT 85    AF (paroxysmal atrial fibrillation)  Anticoagulant long-term use    Patient with Paroxysmal (<7 days) atrial fibrillation which is controlled currently with no rate or rhythm control agents. . Patient is currently in sinus rhythm.[id number] Score: 6. Anticoagulation indicated. Anticoagulation done with eliquis.    Vascular dementia  - mentation at baseline per daughter     Hypertension, essential  - BP soft. Held amlodipine. Gentle IVF.  - continue lasix every other day, monitor bmp closely   - Cr stable at 0.8    Iron deficiency anemia secondary to inadequate dietary iron intake  Patient's anemia is currently controlled.   Current CBC reviewed-   Lab Results   Component Value Date    HGB 8.6 (L) 10/13/2022    HCT 28.2 (L) 10/13/2022     Monitor serial CBC and transfuse if patient becomes hemodynamically unstable, symptomatic or H/H drops below 7/21.  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 [I69.154] 08/03/2021 Not Applicable    Chronic pain [G89.29] 04/19/2021 Yes    Anticoagulant long-term use [Z79.01] 07/10/2020 Not Applicable    Acute cystitis without hematuria [N30.00] 02/27/2020 Yes    Pulmonary hypertension due to interstitial lung disease [I27.23, J84.9] 03/14/2019 Yes    Cryptogenic organizing pneumonia [J84.116] 01/24/2019 Yes    Thrombocytopenia [D69.6] 12/16/2018 Yes    AF (paroxysmal atrial fibrillation) [I48.0] 06/29/2018 Yes    Vascular dementia [F01.50] 12/06/2017 Yes    Hypertension, essential [I10] 06/16/2016 Yes    Iron deficiency anemia secondary to inadequate dietary iron intake [D50.8] 06/24/2013 Yes      Problems Resolved During this Admission:    Diagnosis Date Noted Date Resolved POA    Chronic systolic (congestive) heart failure [I50.22] 08/06/2021 10/12/2022 Yes       Discharged Condition: good    Disposition: Home or Self Care    Follow Up:   Follow-up Information     [facility] Follow up.    Why: Home Health will contact to start care.                     Patient Instructions:      Diet Cardiac     Notify your health care provider if you experience any of the following:  severe uncontrolled pain     Notify your health care provider if you experience any of the following:  difficulty breathing or increased cough     Notify your health care provider if you experience any of the following:  temperature >100.4     Notify your health care provider if you experience any of the following:  increased confusion or weakness     Activity as tolerated     Pending Diagnostic Studies:     None         Medications:  Reconciled Home Medications:       Medication List      START taking these medications    cefdinir 300 MG capsule  Commonly known as: OMNICEF  Take 1 capsule (300 mg total) by mouth 2 (two) times daily. for 3 days     metroNIDAZOLE 500 MG tablet  Commonly known as: FLAGYL  Take 1 tablet (500 mg total) by mouth every 8 (eight) hours. for 3 days        CHANGE how you take these medications    atorvastatin 40 MG tablet  Commonly known as: LIPITOR  TAKE 1 TABLET(40 MG) BY MOUTH EVERY DAY  What changed: when to take this     baclofen 10 MG tablet  Commonly known as: LIORESAL  TAKE 1/2 TO 1 TABLET(5 TO 10 MG) BY MOUTH THREE TIMES DAILY AS NEEDED  What changed: See the new instructions.        CONTINUE taking these medications    albuterol-ipratropium 2.5 mg-0.5 mg/3 mL nebulizer solution  Commonly known as: DUO-NEB  Take 3 mLs by nebulization 2 (two) times daily as needed for Shortness of Breath. Rescue     amLODIPine 10 MG tablet  Commonly known as: NORVASC  TAKE 1 TABLET(10 MG) BY MOUTH EVERY DAY     * apixaban 2.5 mg Tab  Commonly known as: ELIQUIS  Take 1 tablet (2.5 mg total) by mouth 2 (two) times daily.     * ELIQUIS 5 mg Tab  Generic drug: apixaban  Take 5 mg by mouth 2 (two) times daily.     bisacodyL 10 mg Supp  Commonly known as: DULCOLAX  Place 1 suppository (10 mg total) rectally daily as needed (constipation).     carvediloL 3.125 MG tablet  Commonly known as: COREG  Take 3.125 mg by mouth 2 (two) times daily.     * gabapentin 300 MG capsule  Commonly known as: NEURONTIN  Take 1 capsule (300 mg total) by mouth every evening.     * gabapentin 100 MG capsule  Commonly known as: NEURONTIN  Take 1 capsule (100 mg total) by mouth 2 (two) times daily. Take one 100mg capsule twice a day and one 300mg capsule at bedtime     gabapentin 5% baclofen 2% amitriptyline 2% topical cream  Apply topically 3 (three) times daily.     hydrOXYchloroQUINE 200 mg tablet  Commonly known as: PLAQUENIL  Take 1 tablet (200 mg total) by mouth 2 (two) times daily.      magnesium oxide 400 mg (241.3 mg magnesium) tablet  Commonly known as: MAG-OX  Take 400 mg by mouth once daily.     montelukast 10 mg tablet  Commonly known as: SINGULAIR  Take 10 mg by mouth once daily.     mycophenolate 500 mg Tab  Commonly known as: CELLCEPT  TAKE 1 TABLET TWICE A DAY     pantoprazole 40 MG tablet  Commonly known as: PROTONIX  Take 40 mg by mouth once daily.     predniSONE 10 MG tablet  Commonly known as: DELTASONE  Take 1 tablet (10 mg total) by mouth once daily.     thiamine 100 MG tablet  Take 100 mg by mouth once daily.     traZODone 50 MG tablet  Commonly known as: DESYREL  Take 50 mg by mouth every evening.         * This list has 4 medication(s) that are the same as other medications prescribed for you. Read the directions carefully, and ask your doctor or other care provider to review them with you.            STOP taking these medications    traMADoL 50 mg tablet  Commonly known as: ULTRAM     vitamin D 1000 units Tab  Commonly known as: VITAMIN D3        ASK your doctor about these medications    acetaminophen 500 MG tablet  Commonly known as: TYLENOL  Take 1 tablet (500 mg total) by mouth every 4 (four) hours as needed for Pain.     ferrous sulfate 325 (65 FE) MG EC tablet  Take 1 tablet (325 mg total) by mouth once daily.     LIDOcaine 5 %  Commonly known as: LIDODERM  Place 1 patch onto the skin once daily. Remove & Discard patch within 12 hours or as directed by MD            Indwelling Lines/Drains at time of discharge:   Lines/Drains/Airways     None                 Time spent on the discharge of patient: 36 minutes         Katerine Boyer PA-C  Department of Hospital Medicine  [facility] 11H

## 2022-10-19 ENCOUNTER — TELEPHONE (OUTPATIENT)
Dept: NEUROLOGY | Facility: CLINIC | Age: 87
End: 2022-10-19
Payer: MEDICARE

## 2022-10-19 NOTE — TELEPHONE ENCOUNTER
----- Message from Tremontana Chevalier sent at 10/19/2022  9:48 AM CDT -----  Regarding: apppt  Pt's daughter Dr. Madhavi Sethi clling to schedule appt with Dr. Fritz Ac for headaches. Prefer early afternoon, or late morning.. Pls call Dr. Sethi @ [phone]. No avail appts in epic.

## 2022-10-19 NOTE — ASSESSMENT & PLAN NOTE
Patient remains stable from a pulmonary standpoint. She has no new symptoms with a stale cough. She continues to have mild shortness of breath but this remains mostly unchanged and does not prevent her from performing her daily activities. We will make no changes today and have routine follow up as scheduled.  - continue prednisone 10mg daily  - routine follow up  - no further PFTs (at patient's request)

## 2022-10-19 NOTE — PROGRESS NOTES
Subjective:       Patient ID: Selma Lux is a 87 y.o. female.    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 She additionally had a stroke in 2017, and then viral encephalitis in 2018.    She has continued on a combination of prednisone, mycophenolate, and plaquenil. Since being seen last, she has had multiple hospitalizations. She had a stroke in June 2022 that left her with a mild L sided deficit in her lower extremity. She has also had a few brief visits for altered mental status and diagnosed with a UTI versus cystitis.     Recently, she has had no major complaints from a respiratory standpoint. She endorses occasional coughing, mostly after eating meals or eating too quickly. She does not have any sputum production. She denies any unintentional weight loss. She does note some improvement with her quality of life with starting albuterol nebulizers scheduled daily.     Pneumonia  Review of Systems   Constitutional: Negative.    HENT: Negative.     Respiratory: Negative.     Cardiovascular: Negative.    Endocrine: endocrine negative    Musculoskeletal: Negative.    Psychiatric/Behavioral: Negative.       Objective:      Physical Exam   Constitutional: She is oriented to person, place, and time. She appears well-developed and well-nourished.   Cardiovascular: Normal rate and regular rhythm.   Pulmonary/Chest: Normal expansion, symmetric chest wall expansion and breath sounds normal. No respiratory distress. She has no wheezes. She has no rhonchi.   Neurological: She is alert and oriented to person, place, and  time.   Skin: Skin is warm and dry.   Psychiatric: She has a normal mood and affect. Her behavior is normal.   Nursing note and vitals reviewed.  Personal Diagnostic Review    No flowsheet data found.      Assessment:       1. Cryptogenic organizing pneumonia          Outpatient Encounter Medications as of 10/18/2022   Medication Sig Dispense Refill    acetaminophen (TYLENOL) 500 MG tablet Take 1 tablet (500 mg total) by mouth every 4 (four) hours as needed for Pain. (Patient taking differently: Take 1,000 mg by mouth 2 (two) times daily as needed (Headache).) 60 tablet 0    albuterol (PROVENTIL) 2.5 mg /3 mL (0.083 %) nebulizer solution       albuterol-ipratropium (DUO-NEB) 2.5 mg-0.5 mg/3 mL nebulizer solution Take 3 mLs by nebulization 2 (two) times daily as needed for Shortness of Breath. Rescue      amLODIPine (NORVASC) 10 MG tablet TAKE 1 TABLET(10 MG) BY MOUTH EVERY DAY 90 tablet 3    apixaban (ELIQUIS) 2.5 mg Tab Take 1 tablet (2.5 mg total) by mouth 2 (two) times daily. 180 tablet 3    atorvastatin (LIPITOR) 40 MG tablet TAKE 1 TABLET(40 MG) BY MOUTH EVERY DAY (Patient taking differently: Take 40 mg by mouth every evening.) 90 tablet 3    baclofen (LIORESAL) 10 MG tablet TAKE 1/2 TO 1 TABLET(5 TO 10 MG) BY MOUTH THREE TIMES DAILY AS NEEDED (Patient taking differently: Takes 1/2 tablet (5 mg) at lunch, 1/2 tablet with dinner and 1 tablet at bedtime.) 90 tablet 2    bisacodyL (DULCOLAX) 10 mg Supp Place 1 suppository (10 mg total) rectally daily as needed (constipation). 30 suppository 0    carvediloL (COREG) 3.125 MG tablet Take 3.125 mg by mouth 2 (two) times daily.      diclofenac sodium (VOLTAREN) 1 % Gel Apply 3 g topically.      docusate sodium (ENEMEEZ) 283 mg enema Place 283 mg rectally.      ferrous sulfate 325 (65 FE) MG EC tablet Take 1 tablet (325 mg total) by mouth once daily. (Patient taking differently: Take 325 mg by mouth every other day.)  0    gabapentin (NEURONTIN) 100 MG capsule Take 1  capsule (100 mg total) by mouth 2 (two) times daily. Take one 100mg capsule twice a day and one 300mg capsule at bedtime 180 capsule 2    gabapentin (NEURONTIN) 300 MG capsule Take 1 capsule (300 mg total) by mouth every evening. 90 capsule 2    gabapentin 5% baclofen 2% amitriptyline 2% topical cream Apply topically 3 (three) times daily. 240 g 2    LIDOcaine (LIDODERM) 5 % Place 1 patch onto the skin once daily. Remove & Discard patch within 12 hours or as directed by MD (Patient taking differently: Place 1 patch onto the skin once daily. Remove & Discard patch within 12 hours as needed for pain or as directed by MD) 30 patch 2    magnesium oxide (MAG-OX) 400 mg (241.3 mg magnesium) tablet Take 400 mg by mouth once daily.      montelukast (SINGULAIR) 10 mg tablet Take 10 mg by mouth once daily.      mycophenolate (CELLCEPT) 500 mg Tab TAKE 1 TABLET TWICE A  tablet 3    pantoprazole (PROTONIX) 40 MG tablet Take 40 mg by mouth once daily.      predniSONE (DELTASONE) 10 MG tablet Take 1 tablet (10 mg total) by mouth once daily. 30 tablet 2    thiamine 100 MG tablet Take 100 mg by mouth once daily.      traZODone (DESYREL) 50 MG tablet Take 50 mg by mouth every evening.      [] cefdinir (OMNICEF) 300 MG capsule Take 1 capsule (300 mg total) by mouth 2 (two) times daily. for 3 days 6 capsule 0    hydrOXYchloroQUINE (PLAQUENIL) 200 mg tablet Take 1 tablet (200 mg total) by mouth 2 (two) times daily. 180 tablet 11    [] metroNIDAZOLE (FLAGYL) 500 MG tablet Take 1 tablet (500 mg total) by mouth every 8 (eight) hours. for 3 days 9 tablet 0    [DISCONTINUED] albuterol (PROVENTIL/VENTOLIN HFA) 90 mcg/actuation inhaler Inhale 2 puffs into the lungs every 6 (six) hours as needed for Wheezing. Rescue (Patient not taking: Reported on 6/15/2022) 18 g 3    [DISCONTINUED] apixaban (ELIQUIS) 5 mg Tab Take 5 mg by mouth 2 (two) times daily.      [DISCONTINUED] calcium carbonate (TUMS) 200 mg calcium (500 mg)  chewable tablet Take 2 tablets (1,000 mg total) by mouth once daily. (Patient not taking: No sig reported) 60 tablet 11    [DISCONTINUED] famotidine (PEPCID) 20 MG tablet Take 1 tablet (20 mg total) by mouth 2 (two) times daily. (Patient not taking: Reported on 6/15/2022) 60 tablet 11    [DISCONTINUED] lacosamide (VIMPAT) 10 mg/mL Soln oral solution Take 10 mLs (100 mg total) by mouth every 12 (twelve) hours. (Patient not taking: No sig reported) 600 mL 11     Facility-Administered Encounter Medications as of 10/18/2022   Medication Dose Route Frequency Provider Last Rate Last Admin    onabotulinumtoxina injection 200 Units  200 Units Intramuscular Q90 Days Baldomero Giang MD   200 Units at 07/29/20 2300     No orders of the defined types were placed in this encounter.      Plan:       Problem List Items Addressed This Visit          Pulmonary    Cryptogenic organizing pneumonia - Primary    Overview     Given the presence of underlying connective tissue disease and relapse with prednisone dose < 10 mg daily, I suspect this is Cryptogenic Organizing Pneumonia (formerly known as BOOP).         Current Assessment & Plan     Patient remains stable from a pulmonary standpoint. She has no new symptoms with a stale cough. She continues to have mild shortness of breath but this remains mostly unchanged and does not prevent her from performing her daily activities. We will make no changes today and have routine follow up as scheduled.  - continue prednisone 10mg daily  - routine follow up  - no further PFTs (at patient's request)

## 2022-10-19 NOTE — TELEPHONE ENCOUNTER
----- Message from Tremontana Chevalier sent at 10/19/2022  9:42 AM CDT -----  Regarding: appt  SARA BACON calling regarding Appointment Access  (message) for # Pt's daughter Dr. Madhavi Sethi clling to reschedule the 11/2 for a later time during the day with Dr. Valdez/says can reschedule for another date but early afternoon, late morning.. Pls call Dr. Sethi @ [phone]. No avail appts in epic.

## 2022-10-20 ENCOUNTER — DOCUMENT SCAN (OUTPATIENT)
Dept: HOME HEALTH SERVICES | Facility: HOSPITAL | Age: 87
End: 2022-10-20
Payer: MEDICARE

## 2022-10-21 NOTE — PROGRESS NOTES
HPI     Blurred Vision      Additional comments: Watches TV and feels it seems further away  Some peripheral vision changes after stroke  No glasses at this time              Comments     86 Y/io female is here for routine eye exam with C/o LOW VISION   Pt denies pain and discomfort   Pt see's floaters occasional     Eye med: OTC Lubricant OU PRN           Last edited by Satya Mckeon, OD on 8/23/2022 11:43 AM. (History)            Assessment /Plan     For exam results, see Encounter Report.    Myopia of both eyes with astigmatism and presbyopia      1.     1. Distance only and near only specs for TV and newspaper, no magnifiers needed at this time. RTC yearly refraction.                 
- - -

## 2022-10-31 ENCOUNTER — EXTERNAL CHRONIC CARE MANAGEMENT (OUTPATIENT)
Dept: PRIMARY CARE CLINIC | Facility: CLINIC | Age: 87
End: 2022-10-31
Payer: MEDICARE

## 2022-10-31 PROCEDURE — 99490 CHRNC CARE MGMT STAFF 1ST 20: CPT | Mod: PBBFAC | Performed by: FAMILY MEDICINE

## 2022-10-31 PROCEDURE — 99490 PR CHRONIC CARE MGMT, 1ST 20 MIN: ICD-10-PCS | Mod: S$PBB,,, | Performed by: FAMILY MEDICINE

## 2022-10-31 PROCEDURE — 99490 CHRNC CARE MGMT STAFF 1ST 20: CPT | Mod: S$PBB,,, | Performed by: FAMILY MEDICINE

## 2022-11-02 ENCOUNTER — OFFICE VISIT (OUTPATIENT)
Dept: NEUROLOGY | Facility: CLINIC | Age: 87
End: 2022-11-02
Payer: MEDICARE

## 2022-11-02 VITALS
DIASTOLIC BLOOD PRESSURE: 65 MMHG | WEIGHT: 134 LBS | HEART RATE: 82 BPM | HEIGHT: 64 IN | SYSTOLIC BLOOD PRESSURE: 112 MMHG | BODY MASS INDEX: 22.88 KG/M2

## 2022-11-02 DIAGNOSIS — I48.0 AF (PAROXYSMAL ATRIAL FIBRILLATION): ICD-10-CM

## 2022-11-02 DIAGNOSIS — I70.0 AORTIC ATHEROSCLEROSIS: ICD-10-CM

## 2022-11-02 DIAGNOSIS — Z86.73 HISTORY OF STROKE: Primary | ICD-10-CM

## 2022-11-02 PROCEDURE — 99213 OFFICE O/P EST LOW 20 MIN: CPT | Mod: PBBFAC | Performed by: NURSE PRACTITIONER

## 2022-11-02 PROCEDURE — 99999 PR PBB SHADOW E&M-EST. PATIENT-LVL III: ICD-10-PCS | Mod: PBBFAC,,, | Performed by: NURSE PRACTITIONER

## 2022-11-02 PROCEDURE — 99215 PR OFFICE/OUTPT VISIT, EST, LEVL V, 40-54 MIN: ICD-10-PCS | Mod: S$PBB,,, | Performed by: NURSE PRACTITIONER

## 2022-11-02 PROCEDURE — 99999 PR PBB SHADOW E&M-EST. PATIENT-LVL III: CPT | Mod: PBBFAC,,, | Performed by: NURSE PRACTITIONER

## 2022-11-02 PROCEDURE — 99215 OFFICE O/P EST HI 40 MIN: CPT | Mod: S$PBB,,, | Performed by: NURSE PRACTITIONER

## 2022-11-02 NOTE — PROGRESS NOTES
[facility] VISIT      SUBJECTIVE:    History for Present Illness: Selma Lux is a 87 y.o.  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  MRI indicated a small linear area of diffuse restriction along the right corona radiata, suggestive of an acute infarct.  She is accompanied to today's visit by her daughter.    At the time of today's visit, the patient denies new or worsening focal neurologic symptoms concerning for new stroke or TIA.  Patient reports persistent left-sided weakness.  Daughter reports mild difficulty with word finding.  Patient main mode of transportation is via wheelchair due to left-sided weakness.  She can stand and pivot with assistance.  She denies alcohol/tobacco/illicit drug use.  Patient is compliant with home medications including Eliquis and statin medication.      Past Medical History:   Diagnosis Date    Acute cystitis without hematuria 2/27/2020    Acute hypoxemic respiratory failure 4/11/2018    Acute respiratory failure with hypoxia 3/2/2020    Altered mental status     Anemia     Arthritis     Bilateral hand pain 3/14/2018    Branch retinal vein occlusion, left eye 2/20/2015    Chronic bilateral low back pain without sciatica 3/23/2017    Chronic renal failure in pediatric patient, stage 3 (moderate) 4/15/2018    Chronic systolic (congestive) heart failure 8/6/2021    Last Assessment & Plan:  Formatting of this note might be different from the original. 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    Hypertension     Hypertensive encephalopathy     IBS (irritable bowel syndrome) 6/21/2017    IGT (impaired glucose tolerance) 1/12/2016    Iron deficiency anemia secondary to inadequate dietary iron intake 6/24/2013    Joint pain     Keratoconjunctivitis sicca of both eyes not specified as Sjogren's 7/29/2016    Leiomyoma of uterus, unspecified 9/16/2014    Long QT interval 6/29/2016    Due to medication (plaquenil)     Macular edema 1/12/2015    Multinodular goiter 1/12/2016    Neuropathy 8/23/2017    Plaquenil causing adverse effect in therapeutic use 10/7/2016    Pneumococcal vaccine refused 8/17/2016    Pneumonia due to Streptococcus pneumoniae 4/5/2018    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/2015    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    s/p Right Total knee replacement 5/15/2013    Scoliosis of thoracic spine 5/15/2013    Small vessel disease, cerebrovascular 12/28/2017    Stroke     UTI (urinary tract infection) 12/29/2018    Vascular dementia 12/6/2017     Past Surgical History:   Procedure Laterality Date    BREAST CYST EXCISION      CATARACT EXTRACTION      COLONOSCOPY N/A 9/29/2015    Procedure: COLONOSCOPY;  Surgeon: FIDELINA Sanchez MD;  Location: [facility] ENDO [address]);  Service: Endoscopy;  Laterality: N/A;    EYE SURGERY      INJECTION OF ANESTHETIC AGENT AROUND NERVE Left 4/19/2021    Procedure: BLOCK, NERVE, FEMORAL AND OBTURATOR;  Surgeon: Shivam Gonzalez MD;  Location: [facility] PAIN T;  Service: Pain Management;  Laterality: Left;  consent needed    JOINT REPLACEMENT      right knee    KNEE SURGERY Left 12/31/2013    TKR    left parotidectomy      mixed tumor    DE EVAL,SWALLOW FUNCTION,CINE/VIDEO RECORD  6/5/2021         SALIVARY GLAND SURGERY      TONSILLECTOMY      UPPER  GASTROINTESTINAL ENDOSCOPY       Family History   Problem Relation Age of Onset    Hypertension Mother     Heart disease Mother     Hyperthyroidism Mother     Prostate cancer Father         prostate cancer    Cancer Father     Breast cancer Maternal Grandmother     Hyperthyroidism Other     Colon cancer Neg Hx         Current Outpatient Medications:     acetaminophen (TYLENOL) 500 MG tablet, Take 1 tablet (500 mg total) by mouth every 4 (four) hours as needed for Pain. (Patient taking differently: Take 1,000 mg by mouth 2 (two) times daily as needed (Headache).), Disp: 60 tablet, Rfl: 0    albuterol-ipratropium (DUO-NEB) 2.5 mg-0.5 mg/3 mL nebulizer solution, Take 3 mLs by nebulization 2 (two) times daily as needed for Shortness of Breath. Rescue, Disp: , Rfl:     amLODIPine (NORVASC) 10 MG tablet, TAKE 1 TABLET(10 MG) BY MOUTH EVERY DAY, Disp: 90 tablet, Rfl: 3    apixaban (ELIQUIS) 2.5 mg Tab, Take 1 tablet (2.5 mg total) by mouth 2 (two) times daily., Disp: 180 tablet, Rfl: 3    atorvastatin (LIPITOR) 40 MG tablet, TAKE 1 TABLET(40 MG) BY MOUTH EVERY DAY (Patient taking differently: Take 40 mg by mouth every evening.), Disp: 90 tablet, Rfl: 3    baclofen (LIORESAL) 10 MG tablet, TAKE 1/2 TO 1 TABLET(5 TO 10 MG) BY MOUTH THREE TIMES DAILY AS NEEDED (Patient taking differently: Takes 1/2 tablet (5 mg) at lunch, 1/2 tablet with dinner and 1 tablet at bedtime.), Disp: 90 tablet, Rfl: 2    bisacodyL (DULCOLAX) 10 mg Supp, Place 1 suppository (10 mg total) rectally daily as needed (constipation)., Disp: 30 suppository, Rfl: 0    carvediloL (COREG) 3.125 MG tablet, Take 3.125 mg by mouth 2 (two) times daily., Disp: , Rfl:     diclofenac sodium (VOLTAREN) 1 % Gel, Apply 3 g topically., Disp: , Rfl:     ferrous sulfate 325 (65 FE) MG EC tablet, Take 1 tablet (325 mg total) by mouth once daily. (Patient taking differently: Take 325 mg by mouth every other day.), Disp: , Rfl: 0    gabapentin (NEURONTIN) 100 MG capsule, Take  1 capsule (100 mg total) by mouth 2 (two) times daily. Take one 100mg capsule twice a day and one 300mg capsule at bedtime, Disp: 180 capsule, Rfl: 2    gabapentin (NEURONTIN) 300 MG capsule, Take 1 capsule (300 mg total) by mouth every evening., Disp: 90 capsule, Rfl: 2    gabapentin 5% baclofen 2% amitriptyline 2% topical cream, Apply topically 3 (three) times daily., Disp: 240 g, Rfl: 2    LIDOcaine (LIDODERM) 5 %, Place 1 patch onto the skin once daily. Remove & Discard patch within 12 hours or as directed by MD (Patient taking differently: Place 1 patch onto the skin once daily. Remove & Discard patch within 12 hours as needed for pain or as directed by MD), Disp: 30 patch, Rfl: 2    magnesium oxide (MAG-OX) 400 mg (241.3 mg magnesium) tablet, Take 400 mg by mouth once daily., Disp: , Rfl:     montelukast (SINGULAIR) 10 mg tablet, Take 10 mg by mouth once daily., Disp: , Rfl:     mycophenolate (CELLCEPT) 500 mg Tab, TAKE 1 TABLET TWICE A DAY, Disp: 180 tablet, Rfl: 3    pantoprazole (PROTONIX) 40 MG tablet, Take 40 mg by mouth once daily., Disp: , Rfl:     predniSONE (DELTASONE) 10 MG tablet, Take 1 tablet (10 mg total) by mouth once daily., Disp: 30 tablet, Rfl: 2    thiamine 100 MG tablet, Take 100 mg by mouth once daily., Disp: , Rfl:     traZODone (DESYREL) 50 MG tablet, Take 50 mg by mouth every evening., Disp: , Rfl:     albuterol (PROVENTIL) 2.5 mg /3 mL (0.083 %) nebulizer solution, , Disp: , Rfl:     hydrOXYchloroQUINE (PLAQUENIL) 200 mg tablet, Take 1 tablet (200 mg total) by mouth 2 (two) times daily., Disp: 180 tablet, Rfl: 11    Current Facility-Administered Medications:     onabotulinumtoxina injection 200 Units, 200 Units, Intramuscular, Q90 Days, Baldomero Giang MD, 200 Units at 07/29/20 1927     Review of Systems:   Constitutional:  Negative for chills and fever.   HENT:  Negative for sore throat.    Eyes:  Negative visual disturbance.  Respiratory:  Negative for shortness of breath.   "  Cardiovascular:  Negative for chest pain.   Gastrointestinal:  Negative for nausea.   Genitourinary:  Negative for dysuria.   Musculoskeletal:  Negative for back pain.   Skin:  Negative for rash.   Neurological:  Positive left-sided weakness      OBJECTIVE:    /65   Pulse 82   Ht 5' 4" (1.626 m)   Wt 60.8 kg (134 lb)   LMP  (LMP Unknown)   BMI 23.00 kg/m²     Physical Exam   Constitution: She appears well nourished. No distress   LOC: Alert and follows request  Head: Normocephalic and atraumatic.   Cardiovascular: Normal rate. Intact distal pulses  Pulmonary/Chest: Effort normal. No respiratory distress  Psychiatric: no pressured speech; normal affect    Neurologic Exam:  Orientation: person, place and time  Language: No aphasia  Speech: No dysarthria  Visual Fields (CN II):  Full  EOM (CN III, IV, VI): Full intact  Pupils (CN II, III): PERRL  Facial Sensation (CN V): symmetric  Facial Movement (CN VII): Symmetrical facial expressions   Hearing (CN VIII):  Intact bilaterally   Shoulder/Neck (CN XI): SCM-Left: Normal; SCM-Right: Normal; Left Shoulder Shrug:  Decreased/asymmetrical; Right Shoulder Shrug: Normal  Tongue (CN XII): to midline  Motor examination of all extremities :demonstrates normal bulk and tone in all four limbs. There are no atrophy or fasciculations.        Left Right     Left Right   Deltoid 3/5 5/5   Hip Flexion 4/5 5/5   Biceps 3/5 5/5   Hip Extension 4/5 5/5   Triceps 3/5 5/5   Knee Flexion 4/5 5/5   Wrist Ext 3/5 5/5   Knee Extension 4/5 5/5   Finger Abd 3/5 5/5   Ankle dorsiflex 4/5 5/5           Ankle plantar flex 4/5 5/5     Sensory examination: is normal light touch in BUE and BLE.    Gait:  Not assessed at today's visit patient in wheelchair   Coordination: No dysmetria with finger-to-nose . Rapid alternating movements and fine finger movements are intact.                                                                                                                            "                                                               NIHSS:2  Modified Guinda Score:  2     Relevant Labwork:  Recent Labs   Lab 22  0226   Hemoglobin A1C  --  5.3   LDL Cholesterol 45.0 L  --    HDL 65  --    Triglycerides 50  --    Cholesterol 120  --        Diagnostic Results:  Brain Imaging   MRI of the brain 2022:  Small linear area of diffusion restriction along the right coronary radiata, suggestive of acute infarct.  No large vessel occlusion.     Sequela of chronic microvascular ischemic changes.     Stable right subdural collection.     Remote bilateral cerebellar infarcts.  Vessel Imaging   CTA stroke multi phase 2022:  Limited exam due to suboptimal contrast timing and unavailable noncontrast images.  Additional evaluation, as clinically warranted     No major vascular distribution infarct or large vessel occlusion.     Stable 3 mm subdural collection overlying the right frontal lobe.     Bilateral distal ICAs atherosclerotic disease without significant stenosis.  Cardiac Imaging     Assessment:  Selma Lux  is a 87 y.o.  female  seen today in clinic for follow-up assessment and recommendations. The following recommendations and plan were discussed in depth with the patient who voiced understanding and was in agreement.  Plan:  History of right corona radiata infarct  -Stroke etiology suspected   --In-depth discussion with patient regarding diagnosis ,imaging findings  & stroke risk factors  -Plan for aggressive risk factor modification and maximum medical management  -- Antithrombotics/ Anticoagulation:  Continue home Eliquis for secondary stroke prevention  - Lipid Management: Target is LDL < 70mg/dL. Continue atorvastatin 40 mg daily;LDL 45  -blood pressure:  Target systolic BP<140mmHg, Patient at  goal today  - Rehab efforts:  Continue outpatient physical and occupational therapy.  Patient may benefit from left lower extremity orthotics due to  ankle pronation to help with stability during weight-bearing.  - Diagnostics ordered/pending:  None  -Atrial fibrillation:  Continue home Eliquis.  Follow-up with Cardiology for chronic management    Patient/Family teaching  -A significant amount of time was spent reviewing the patient's stroke risk factors   -Counseled on lifestyle modifications for risk factor reduction     We discussed the usual stroke warning signs and symptoms, and the need to activate EMS (call 911) as soon as the symptoms present.  - Sudden onset numbness or weakness of the face, arm, or leg;  especially on one side of the body  - Sudden confusion, trouble speaking, or understanding  - Sudden trouble seeing in one or both eyes  - Sudden trouble walking, dizziness, loss of coordination  - Sudden severe headache with no known cause      I will plan on having Selma return to clinic as needed. The patient can contact my office with any questions or concerns they may have as they arise in the interim.     45  minutes of total time spent on the encounter, which includes face to face time and non-face to face time preparing to see the patient (eg, review of tests), Obtaining and/or reviewing separately obtained history, Documenting clinical information in the electronic or other health record, Independently interpreting results (not separately reported) and communicating results to the patient/family/caregiver, patient/family education and Care coordination (not separately reported).     GREY Cleary  Department of Vascular Neurology  [facility]  [phone]    This note is dictated on M*Modal Fluency Direct word recognition program. There are word recognition mistakes that are occasionally missed on review

## 2022-11-03 ENCOUNTER — EXTERNAL HOME HEALTH (OUTPATIENT)
Dept: HOME HEALTH SERVICES | Facility: HOSPITAL | Age: 87
End: 2022-11-03
Payer: MEDICARE

## 2022-11-07 ENCOUNTER — DOCUMENT SCAN (OUTPATIENT)
Dept: HOME HEALTH SERVICES | Facility: HOSPITAL | Age: 87
End: 2022-11-07
Payer: MEDICARE

## 2022-11-11 ENCOUNTER — PATIENT MESSAGE (OUTPATIENT)
Dept: CARDIOLOGY | Facility: CLINIC | Age: 87
End: 2022-11-11
Payer: MEDICARE

## 2022-11-15 ENCOUNTER — TELEPHONE (OUTPATIENT)
Dept: CARDIOLOGY | Facility: CLINIC | Age: 87
End: 2022-11-15
Payer: MEDICARE

## 2022-11-15 RX ORDER — CARVEDILOL 3.12 MG/1
3.12 TABLET ORAL 2 TIMES DAILY WITH MEALS
Qty: 120 TABLET | Refills: 3 | Status: SHIPPED | OUTPATIENT
Start: 2022-11-15 | End: 2023-01-01

## 2022-11-30 ENCOUNTER — EXTERNAL CHRONIC CARE MANAGEMENT (OUTPATIENT)
Dept: PRIMARY CARE CLINIC | Facility: CLINIC | Age: 87
End: 2022-11-30
Payer: MEDICARE

## 2022-11-30 PROCEDURE — 99490 CHRNC CARE MGMT STAFF 1ST 20: CPT | Mod: PBBFAC | Performed by: FAMILY MEDICINE

## 2022-11-30 PROCEDURE — 99490 CHRNC CARE MGMT STAFF 1ST 20: CPT | Mod: S$PBB,,, | Performed by: FAMILY MEDICINE

## 2022-11-30 PROCEDURE — 99490 PR CHRONIC CARE MGMT, 1ST 20 MIN: ICD-10-PCS | Mod: S$PBB,,, | Performed by: FAMILY MEDICINE

## 2022-12-06 ENCOUNTER — OFFICE VISIT (OUTPATIENT)
Dept: CARDIOLOGY | Facility: CLINIC | Age: 87
End: 2022-12-06
Payer: MEDICARE

## 2022-12-06 ENCOUNTER — PATIENT MESSAGE (OUTPATIENT)
Dept: CARDIOLOGY | Facility: CLINIC | Age: 87
End: 2022-12-06

## 2022-12-06 VITALS
HEART RATE: 80 BPM | HEIGHT: 64 IN | BODY MASS INDEX: 25.67 KG/M2 | OXYGEN SATURATION: 96 % | SYSTOLIC BLOOD PRESSURE: 131 MMHG | WEIGHT: 150.38 LBS | DIASTOLIC BLOOD PRESSURE: 61 MMHG

## 2022-12-06 DIAGNOSIS — I69.154 HEMIPLEGIA AND HEMIPARESIS FOLLOWING NONTRAUMATIC INTRACEREBRAL HEMORRHAGE AFFECTING LEFT NON-DOMINANT SIDE: ICD-10-CM

## 2022-12-06 DIAGNOSIS — Z86.73 HISTORY OF STROKE: ICD-10-CM

## 2022-12-06 DIAGNOSIS — I63.311 CEREBRAL INFARCTION DUE TO THROMBOSIS OF RIGHT MIDDLE CEREBRAL ARTERY: ICD-10-CM

## 2022-12-06 DIAGNOSIS — D50.8 IRON DEFICIENCY ANEMIA SECONDARY TO INADEQUATE DIETARY IRON INTAKE: ICD-10-CM

## 2022-12-06 DIAGNOSIS — I27.23 PULMONARY HYPERTENSION DUE TO INTERSTITIAL LUNG DISEASE: ICD-10-CM

## 2022-12-06 DIAGNOSIS — J44.1 COPD EXACERBATION: ICD-10-CM

## 2022-12-06 DIAGNOSIS — J84.9 PULMONARY HYPERTENSION DUE TO INTERSTITIAL LUNG DISEASE: ICD-10-CM

## 2022-12-06 DIAGNOSIS — J84.116 CRYPTOGENIC ORGANIZING PNEUMONIA: ICD-10-CM

## 2022-12-06 DIAGNOSIS — R01.1 SYSTOLIC MURMUR: ICD-10-CM

## 2022-12-06 DIAGNOSIS — Z86.73 HISTORY OF TRANSIENT ISCHEMIC ATTACK (TIA): ICD-10-CM

## 2022-12-06 DIAGNOSIS — I48.0 PAROXYSMAL ATRIAL FIBRILLATION: Primary | ICD-10-CM

## 2022-12-06 DIAGNOSIS — J44.9 CHRONIC OBSTRUCTIVE PULMONARY DISEASE, UNSPECIFIED COPD TYPE: ICD-10-CM

## 2022-12-06 PROCEDURE — 99215 OFFICE O/P EST HI 40 MIN: CPT | Mod: S$PBB,,, | Performed by: INTERNAL MEDICINE

## 2022-12-06 PROCEDURE — 99215 PR OFFICE/OUTPT VISIT, EST, LEVL V, 40-54 MIN: ICD-10-PCS | Mod: S$PBB,,, | Performed by: INTERNAL MEDICINE

## 2022-12-06 PROCEDURE — 99999 PR PBB SHADOW E&M-EST. PATIENT-LVL V: CPT | Mod: PBBFAC,,, | Performed by: INTERNAL MEDICINE

## 2022-12-06 PROCEDURE — 99999 PR PBB SHADOW E&M-EST. PATIENT-LVL V: ICD-10-PCS | Mod: PBBFAC,,, | Performed by: INTERNAL MEDICINE

## 2022-12-06 PROCEDURE — 99215 OFFICE O/P EST HI 40 MIN: CPT | Mod: PBBFAC | Performed by: INTERNAL MEDICINE

## 2022-12-06 NOTE — PROGRESS NOTES
Subjective:   Patient ID:  Selma Lux is a 87 y.o. 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 Last seen in November for intermittent lower ext edema on lasix 20. No other CHF signs/symptoms. Echo with EF reported as 40%; reviewed with echo staff, EF appeared stable, low normal 50-55%. Trialed increased lasix 40 qd; BMP stable. Felt SANAZ improved slightly for about one week then no change.     Returns for follow up today. Ongoing SANAZ, stable. Unable to tolerate knee high compression stockings previously. Denies PND, orthopnea, CP, palpitiatons, presyncope/syncope. Chronic dyspnea slightly worse; predominantly decreased energy, fatigue and generalized weakness. Feels like it requires more effort for routine activities. Have adjusted PT plans to see if can help with her strength. Also recently adjusted on her RA meds, limited by hip pain.      Also today here to discuss current cardiac meds and if we can decrease her number of medications. Carvedilol off for last week, wanted to see if she could stop. Started at time of paroxysmal AF; no other BB indication. Also on ASA + plavix for years; unclear why plavix initially started - ?at time of MRI with chronic microvascular change and remote punctate stroke. No prior stenting. No clinical prior CVA.         HPI:   BP has been running normal at home.  Denies palpitations or fluttering in the chest  No chest  pain, Orthopnea, PND of heart failure symptoms.   SOB on exertion.   Her leg swelling is unchanged and she cannot tolerate compression stalkings and cannot raise her leg lying flat because she gets SOB with her lung disease.      The patient location is: [place]  The chief complaint leading to consultation is: leg edema, PAF  AV visit  Total time spent with patient: 19 min  Each patient to whom he or she provides medical services by telemedicine is:  (1) informed of the relationship between the physician and patient and the respective role of any other health care provider with respect to management of the patient; and (2) notified that he or she may decline to receive medical services by telemedicine and may withdraw from such care at any time.     Notes: see abiove        HPI:   BP has doing well at home  On Eliquis and ASA, occasional bruising but overall doing well.   No chest pain, Orthopnea, PND of heart failure symptoms.   Denies palpitations or fluttering in the chest  Patient does limited activity, assistance help her take a bath.         The patient location is: home  The chief complaint leading to consultation is: HTN, AF     Visit type:AV VISIT  Face to Face time with patient: 20 minutes of total time spent on the encounter, which includes face to face time and non-face to face time preparing to see the patient (eg, review of tests), Obtaining and/or reviewing separately obtained history, Documenting clinical information in the electronic or other health record, Independently interpreting results (not separately reported) and communicating results to the patient/family/caregiver, or Care coordination (not separately reported).      Each patient to whom he or she provides medical services by telemedicine is:  (1) informed of the relationship between the physician and patient and the respective role of any other health care provider with respect to management of the patient; and (2) notified that he or  she may decline to receive medical services by telemedicine and may withdraw from such care at any time.     Notes:      Recent hospital discharge 02/08/22  Selma Lux is a 84 y.o. 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  She has had encephalopathy during her hospital stay and admission to rehab. She tested positive for COVID on 1/23.  Paramedics state that she has been having very poor oral intake. Patient was admitted to ICU for hypotension and respiratory distress, anticipating rapid deterioration 2/2 her medical history. Patient is on 4L NC, SpO2 stable, she had episode of hypotensions but not sustained, no pressor indicated. Mentation improved in the morning. Patient required sedation for procedure. Family visit seemed to calm her down. Patient no longer required Precedex gtt. Qtc prolongation resolved, likely 2/2 to chronic plaquenil. Patient sat well on room air. Patient ready to transfer to lower level of care unit. ID consulted. Vanc and rocephin Dc'howard 01/28. Transitioned to amp- completing 14d course. Found to have thrush w/up-trending WBC. Started on fluconazole 01/30. Improvement in leukocytosis.  T bili noted to be rising; likely due to fluconazole.  Due to improvement of thrush on physical exam, fluconazole stopped after 7 day course of treatment versus 14. T bili with subsequent improvement.  Deemed stable for discharge to rehab 01/31.  Issues due to COVID positive status and accepting facilities.  Patient discharged to [facility] on 02/08.  Discharge plan discussed with daughter who is in agreement.     HPI:  Patient is having  some neck pain.   Patient is sedentary. Patient c/o muscle aches.   No chest pain, Orthopnea, PND of heart failure symptoms.   Denies palpitations or fluttering in the chest  Recent Subdural hematoma without any significant neurologic deficits.   She lost weight since hospitalization.      The patient location is: home  The chief complaint leading to consultation is: PAF, HTN     Visit type: AV visit  Face to Face time with patient: 20 minutes of total time spent on the encounter, which includes face to face time and non-face to face time preparing to see the patient (eg, review of tests), Obtaining and/or reviewing separately obtained history, Documenting clinical information in the electronic or other health record, Independently interpreting results (not separately reported) and communicating results to the patient/family/caregiver, or Care coordination (not separately reported).      Each patient to whom he or she provides medical services by telemedicine is:  (1) informed of the relationship between the physician and patient and the respective role of any other health care provider with respect to management of the patient; and (2) notified that he or she may decline to receive medical services by telemedicine and may withdraw from such care at any time.      HPI:   No chest pain, Orthopnea, PND of heart failure symptoms.   Denies palpitations or fluttering in the chest  Patient is sedentary  Patient c/o left sided groin pain.     Patient Active Problem List   Diagnosis    Iron deficiency anemia secondary to inadequate dietary iron intake    PUD (peptic ulcer disease)    Submucous leiomyoma of uterus    Cystoid macular edema, left eye    Seropositive rheumatoid arthritis of multiple sites    Vitamin D insufficiency    Hypertension, essential    Long QT interval    Generalized weakness    Vascular dementia    History of stroke    Chronic renal failure syndrome, stage 3 (moderate)    Oropharyngeal dysphagia    AF  "(paroxysmal atrial fibrillation)    Aortic atherosclerosis    Retinal macroaneurysm of left eye    Thrombocytopenia    Dystonia    Poor nutrition    Cryptogenic organizing pneumonia    Multinodular goiter    KERMIT (acute kidney injury)    Leukocytosis    Pulmonary hypertension due to interstitial lung disease    Acute encephalopathy    Post-traumatic osteoarthritis of both hips    Cerebral microvascular disease    Immunosuppressed status    History of transient ischemic attack (TIA)    Hyperlipidemia    Thyroid antibody positive    Primary osteoarthritis of left hip    Mood disorder    Spinal stenosis    Lumbar spondylosis    Acute cystitis without hematuria    Anticoagulant long-term use    Chronic pain    High risk medication use    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    Decreased range of motion of intervertebral discs of cervical spine    Decreased range of motion (ROM) of shoulder    COPD exacerbation    Immunocompromised state due to drug therapy    Moderate protein-calorie malnutrition    Serum total bilirubin elevated    History of 2019 novel coronavirus disease (COVID-19)    Intracranial subdural hematoma    SDH (subdural hematoma)    Age-related physical debility    Leukocytosis    RA (rheumatoid arthritis)    Stroke    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  CrCl cannot be calculated (Patient's most recent lab result is older than the maximum 7 days allowed.).    Lab Results   Component Value Date     10/13/2022    K 4.3 10/13/2022     10/13/2022    CO2 18 (L) " 10/13/2022    BUN 16 10/13/2022    CREATININE 0.8 10/13/2022    GLU 95 10/13/2022    HGBA1C 5.3 06/20/2022    MG 1.7 10/12/2022    AST 10 10/11/2022    ALT 7 (L) 10/11/2022    ALBUMIN 3.1 (L) 10/11/2022    PROT 5.5 (L) 10/11/2022    BILITOT 1.8 (H) 10/11/2022    WBC 8.75 10/13/2022    HGB 8.6 (L) 10/13/2022    HCT 28.2 (L) 10/13/2022    HCT 36 06/19/2022    MCV 81 (L) 10/13/2022    PLT 85 (L) 10/13/2022    INR 1.1 06/20/2022    TSH 0.572 10/11/2022    CHOL 120 06/19/2022    HDL 65 06/19/2022    LDLCALC 45.0 (L) 06/19/2022    TRIG 50 06/19/2022       Current Outpatient Medications   Medication Sig    acetaminophen (TYLENOL) 500 MG tablet Take 1 tablet (500 mg total) by mouth every 4 (four) hours as needed for Pain. (Patient taking differently: Take 1,000 mg by mouth 2 (two) times daily as needed (Headache).)    albuterol (PROVENTIL) 2.5 mg /3 mL (0.083 %) nebulizer solution     albuterol-ipratropium (DUO-NEB) 2.5 mg-0.5 mg/3 mL nebulizer solution Take 3 mLs by nebulization 2 (two) times daily as needed for Shortness of Breath. Rescue    amLODIPine (NORVASC) 10 MG tablet TAKE 1 TABLET(10 MG) BY MOUTH EVERY DAY    apixaban (ELIQUIS) 2.5 mg Tab Take 1 tablet (2.5 mg total) by mouth 2 (two) times daily.    atorvastatin (LIPITOR) 40 MG tablet TAKE 1 TABLET(40 MG) BY MOUTH EVERY DAY (Patient taking differently: Take 40 mg by mouth every evening.)    baclofen (LIORESAL) 10 MG tablet TAKE 1/2 TO 1 TABLET(5 TO 10 MG) BY MOUTH THREE TIMES DAILY AS NEEDED (Patient taking differently: Takes 1/2 tablet (5 mg) at lunch, 1/2 tablet with dinner and 1 tablet at bedtime.)    bisacodyL (DULCOLAX) 10 mg Supp Place 1 suppository (10 mg total) rectally daily as needed (constipation).    carvediloL (COREG) 3.125 MG tablet Take 1 tablet (3.125 mg total) by mouth 2 (two) times daily with meals.    gabapentin (NEURONTIN) 100 MG capsule Take 1 capsule (100 mg total) by mouth 2 (two) times daily. Take one 100mg capsule twice a day and one 300mg  capsule at bedtime    gabapentin (NEURONTIN) 300 MG capsule Take 1 capsule (300 mg total) by mouth every evening.    gabapentin 5% baclofen 2% amitriptyline 2% topical cream Apply topically 3 (three) times daily.    LIDOcaine (LIDODERM) 5 % Place 1 patch onto the skin once daily. Remove & Discard patch within 12 hours or as directed by MD (Patient taking differently: Place 1 patch onto the skin once daily. Remove & Discard patch within 12 hours as needed for pain or as directed by MD)    magnesium oxide (MAG-OX) 400 mg (241.3 mg magnesium) tablet Take 400 mg by mouth once daily.    montelukast (SINGULAIR) 10 mg tablet Take 10 mg by mouth once daily.    mycophenolate (CELLCEPT) 500 mg Tab TAKE 1 TABLET TWICE A DAY    pantoprazole (PROTONIX) 40 MG tablet Take 40 mg by mouth once daily.    predniSONE (DELTASONE) 10 MG tablet Take 1 tablet (10 mg total) by mouth once daily.    thiamine 100 MG tablet Take 100 mg by mouth once daily.    traZODone (DESYREL) 50 MG tablet Take 50 mg by mouth every evening.    ferrous sulfate, dried (SLOW FE) 160 mg (50 mg iron) TbSR Take 1 tablet (160 mg total) by mouth once daily.    hydrOXYchloroQUINE (PLAQUENIL) 200 mg tablet Take 1 tablet (200 mg total) by mouth 2 (two) times daily.     Current Facility-Administered Medications   Medication    onabotulinumtoxina injection 200 Units       Review of Systems   Constitutional: Negative for chills, decreased appetite, malaise/fatigue, night sweats, weight gain and weight loss.   Eyes:  Negative for blurred vision, double vision, visual disturbance and visual halos.   Cardiovascular:  Negative for chest pain, claudication, cyanosis, dyspnea on exertion, irregular heartbeat, leg swelling, near-syncope, orthopnea, palpitations, paroxysmal nocturnal dyspnea and syncope.   Respiratory:  Negative for cough, hemoptysis, snoring, sputum production and wheezing.    Endocrine: Negative for cold intolerance, heat intolerance, polydipsia and polyphagia.    Hematologic/Lymphatic: Negative for adenopathy and bleeding problem. Does not bruise/bleed easily.   Skin:  Negative for flushing, itching, poor wound healing and rash.   Musculoskeletal:  Negative for arthritis, back pain, falls, gout, joint pain, joint swelling, muscle cramps, muscle weakness, myalgias, neck pain and stiffness.   Gastrointestinal:  Negative for bloating, abdominal pain, anorexia, diarrhea, dysphagia, excessive appetite, flatus, hematemesis, jaundice, melena and nausea.   Genitourinary:  Negative for hesitancy and incomplete emptying.   Neurological:  Negative for aphonia, brief paralysis, difficulty with concentration, disturbances in coordination, excessive daytime sleepiness, dizziness, focal weakness, light-headedness, loss of balance and weakness.   Psychiatric/Behavioral:  Negative for altered mental status, depression, hallucinations, hypervigilance, memory loss, substance abuse and suicidal ideas. The patient does not have insomnia and is not nervous/anxious.      Objective:   Physical Exam  Vitals reviewed: patient visibly dyspneic gettig on the exaiming table, left graoin exam beingn.   Constitutional:       General: She is not in acute distress.     Appearance: She is well-developed. She is not diaphoretic.   HENT:      Head: Normocephalic and atraumatic.      Nose: Nose normal.      Mouth/Throat:      Pharynx: No oropharyngeal exudate.   Eyes:      General: No scleral icterus.        Right eye: No discharge.         Left eye: No discharge.      Conjunctiva/sclera: Conjunctivae normal.      Pupils: Pupils are equal, round, and reactive to light.   Neck:      Thyroid: No thyromegaly.      Vascular: No JVD.      Trachea: No tracheal deviation.   Cardiovascular:      Rate and Rhythm: Normal rate and regular rhythm.      Pulses: Intact distal pulses.      Heart sounds: Normal heart sounds. No murmur heard.    No friction rub. No gallop.   Pulmonary:      Effort: Pulmonary effort is normal.  No respiratory distress.      Breath sounds: Normal breath sounds. No stridor. No wheezing or rales.   Chest:      Chest wall: No tenderness.   Abdominal:      General: Bowel sounds are normal. There is no distension.      Palpations: Abdomen is soft. There is no mass.      Tenderness: There is no abdominal tenderness. There is no guarding or rebound.   Musculoskeletal:         General: No tenderness. Normal range of motion.      Cervical back: Normal range of motion and neck supple.   Lymphadenopathy:      Cervical: No cervical adenopathy.   Skin:     General: Skin is warm.      Coloration: Skin is not pale.      Findings: No erythema or rash.   Neurological:      Mental Status: She is alert and oriented to person, place, and time.      Cranial Nerves: No cranial nerve deficit.      Motor: No abnormal muscle tone.      Coordination: Coordination normal.      Deep Tendon Reflexes: Reflexes are normal and symmetric. Reflexes normal.   Psychiatric:         Behavior: Behavior normal.         Thought Content: Thought content normal.         Judgment: Judgment normal.       Assessment:     1. Paroxysmal atrial fibrillation    2. Systolic murmur    3. History of stroke    4. History of transient ischemic attack (TIA)    5. 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    6. 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 COPD exacerbation    8. Pulmonary hypertension due to interstitial lung disease    9. Cryptogenic organizing pneumonia    10. Chronic obstructive pulmonary disease, unspecified COPD type    11. Iron deficiency anemia secondary to inadequate dietary iron intake        Plan:     No change in cardiac meds. Start iron and discuss with  (? Stool occult test) . No obvious mass palpated in the left groin patient to discuss further groin loya with Dr. Hirsch.    Selma was seen today for establish care, follow-up  and shortness of breath.    Diagnoses and all orders for this visit:    Paroxysmal atrial fibrillation    Systolic murmur  -     Echo Saline Bubble? 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    RTC 6 mo  I Spent more then 30 min discussing her concerns.

## 2022-12-06 NOTE — Clinical Note
Please let the daughter know that I recommend testing her stool for  blood and please discuss this with Dr. Hirsch.

## 2022-12-08 ENCOUNTER — PATIENT MESSAGE (OUTPATIENT)
Dept: SPINE | Facility: CLINIC | Age: 87
End: 2022-12-08
Payer: MEDICARE

## 2022-12-08 DIAGNOSIS — M47.812 SPONDYLOSIS OF CERVICAL REGION WITHOUT MYELOPATHY OR RADICULOPATHY: ICD-10-CM

## 2022-12-08 DIAGNOSIS — M48.00 SPINAL STENOSIS, UNSPECIFIED SPINAL REGION: ICD-10-CM

## 2022-12-08 DIAGNOSIS — M47.816 SPONDYLOSIS OF LUMBAR REGION WITHOUT MYELOPATHY OR RADICULOPATHY: ICD-10-CM

## 2022-12-09 RX ORDER — GABAPENTIN 100 MG/1
100 CAPSULE ORAL 2 TIMES DAILY
Qty: 180 CAPSULE | Refills: 2 | Status: ON HOLD | OUTPATIENT
Start: 2022-12-09 | End: 2023-01-01 | Stop reason: SDUPTHER

## 2022-12-09 RX ORDER — GABAPENTIN 300 MG/1
300 CAPSULE ORAL NIGHTLY
Qty: 90 CAPSULE | Refills: 2 | Status: ON HOLD | OUTPATIENT
Start: 2022-12-09 | End: 2023-01-01 | Stop reason: SDUPTHER

## 2022-12-13 PROCEDURE — G0179 PR HOME HEALTH MD RECERTIFICATION: ICD-10-PCS | Mod: ,,, | Performed by: INTERNAL MEDICINE

## 2022-12-13 PROCEDURE — G0179 MD RECERTIFICATION HHA PT: HCPCS | Mod: ,,, | Performed by: INTERNAL MEDICINE

## 2022-12-16 ENCOUNTER — TELEPHONE (OUTPATIENT)
Dept: INTERNAL MEDICINE | Facility: CLINIC | Age: 87
End: 2022-12-16
Payer: MEDICARE

## 2022-12-16 ENCOUNTER — HOSPITAL ENCOUNTER (OUTPATIENT)
Dept: CARDIOLOGY | Facility: HOSPITAL | Age: 87
Discharge: HOME | End: 2022-12-16
Attending: INTERNAL MEDICINE
Payer: MEDICARE

## 2022-12-16 VITALS
BODY MASS INDEX: 25.61 KG/M2 | HEART RATE: 75 BPM | SYSTOLIC BLOOD PRESSURE: 130 MMHG | WEIGHT: 150 LBS | HEIGHT: 64 IN | DIASTOLIC BLOOD PRESSURE: 60 MMHG

## 2022-12-16 DIAGNOSIS — R01.1 SYSTOLIC MURMUR: ICD-10-CM

## 2022-12-16 LAB
ASCENDING AORTA: 3.38 CM
AV INDEX (PROSTH): 0.84
AV MEAN GRADIENT: 4 MMHG
AV PEAK GRADIENT: 8 MMHG
AV VALVE AREA: 2.86 CM2
AV VELOCITY RATIO: 0.67
BSA FOR ECHO PROCEDURE: 1.75 M2
CV ECHO LV RWT: 0.46 CM
DOP CALC AO PEAK VEL: 1.38 M/S
DOP CALC AO VTI: 25.49 CM
DOP CALC LVOT AREA: 3.4 CM2
DOP CALC LVOT DIAMETER: 2.08 CM
DOP CALC LVOT PEAK VEL: 0.93 M/S
DOP CALC LVOT STROKE VOLUME: 72.95 CM3
DOP CALCLVOT PEAK VEL VTI: 21.48 CM
E WAVE DECELERATION TIME: 170.9 MSEC
E/A RATIO: 0.96
E/E' RATIO: 12.53 M/S
ECHO LV POSTERIOR WALL: 1.1 CM (ref 0.6–1.1)
EJECTION FRACTION: 65 %
FRACTIONAL SHORTENING: 38 % (ref 28–44)
INTERVENTRICULAR SEPTUM: 1.1 CM (ref 0.6–1.1)
IVRT: 77.07 MSEC
LA MAJOR: 5.18 CM
LA MINOR: 5.26 CM
LA WIDTH: 4.59 CM
LEFT ATRIUM SIZE: 4.77 CM
LEFT ATRIUM VOLUME INDEX MOD: 49.1 ML/M2
LEFT ATRIUM VOLUME INDEX: 56.1 ML/M2
LEFT ATRIUM VOLUME MOD: 84.98 CM3
LEFT ATRIUM VOLUME: 97.14 CM3
LEFT INTERNAL DIMENSION IN SYSTOLE: 2.96 CM (ref 2.1–4)
LEFT VENTRICLE DIASTOLIC VOLUME INDEX: 56.92 ML/M2
LEFT VENTRICLE DIASTOLIC VOLUME: 98.47 ML
LEFT VENTRICLE MASS INDEX: 112 G/M2
LEFT VENTRICLE SYSTOLIC VOLUME INDEX: 19.6 ML/M2
LEFT VENTRICLE SYSTOLIC VOLUME: 33.83 ML
LEFT VENTRICULAR INTERNAL DIMENSION IN DIASTOLE: 4.8 CM (ref 3.5–6)
LEFT VENTRICULAR MASS: 193.96 G
LV LATERAL E/E' RATIO: 11.75 M/S
LV SEPTAL E/E' RATIO: 13.43 M/S
MV A" WAVE DURATION": 16.56 MSEC
MV PEAK A VEL: 0.98 M/S
MV PEAK E VEL: 0.94 M/S
MV STENOSIS PRESSURE HALF TIME: 49.56 MS
MV VALVE AREA P 1/2 METHOD: 4.44 CM2
PISA TR MAX VEL: 3.16 M/S
PULM VEIN S/D RATIO: 0.63
PV PEAK D VEL: 0.51 M/S
PV PEAK S VEL: 0.32 M/S
RA MAJOR: 4.65 CM
RA PRESSURE: 3 MMHG
RA WIDTH: 3.55 CM
RIGHT VENTRICULAR END-DIASTOLIC DIMENSION: 3.45 CM
RV TISSUE DOPPLER FREE WALL SYSTOLIC VELOCITY 1 (APICAL 4 CHAMBER VIEW): 15.19 CM/S
SINUS: 3.49 CM
STJ: 2.93 CM
TDI LATERAL: 0.08 M/S
TDI SEPTAL: 0.07 M/S
TDI: 0.08 M/S
TR MAX PG: 40 MMHG
TRICUSPID ANNULAR PLANE SYSTOLIC EXCURSION: 1.93 CM
TV REST PULMONARY ARTERY PRESSURE: 43 MMHG

## 2022-12-16 PROCEDURE — 93306 ECHO (CUPID ONLY): ICD-10-PCS | Mod: 26,,, | Performed by: INTERNAL MEDICINE

## 2022-12-16 PROCEDURE — 93306 TTE W/DOPPLER COMPLETE: CPT

## 2022-12-16 PROCEDURE — 93306 TTE W/DOPPLER COMPLETE: CPT | Mod: 26,,, | Performed by: INTERNAL MEDICINE

## 2022-12-19 ENCOUNTER — DOCUMENT SCAN (OUTPATIENT)
Dept: HOME HEALTH SERVICES | Facility: HOSPITAL | Age: 87
End: 2022-12-19
Payer: MEDICARE

## 2022-12-19 ENCOUNTER — TELEPHONE (OUTPATIENT)
Dept: CARDIOLOGY | Facility: CLINIC | Age: 87
End: 2022-12-19
Payer: MEDICARE

## 2022-12-19 DIAGNOSIS — I48.0 PAROXYSMAL ATRIAL FIBRILLATION: Primary | ICD-10-CM

## 2022-12-19 DIAGNOSIS — I10 PRIMARY HYPERTENSION: ICD-10-CM

## 2022-12-19 DIAGNOSIS — I70.0 AORTIC ATHEROSCLEROSIS: ICD-10-CM

## 2022-12-19 NOTE — PROGRESS NOTES
plz let pt. Know that echo (ultrasound of the heart) is overall normal with some changs in the heart related to high BP.

## 2022-12-19 NOTE — TELEPHONE ENCOUNTER
----- Message from Kelly Cantu RN sent at 12/19/2022  4:13 PM CST -----  Regarding: FW: home health nurse  Dr. Garcia,    See note below.  Daughter said not SOB and resting.  Phone number [phone]. Appt saved with Dr. Farah if you feel the patient needs to be seen.    Thank you,  Kelly   ----- Message -----  From: Coby Calderon  Sent: 12/19/2022  11:22 AM CST  To: Kelly Cantu RN  Subject: home health nurse                                The pt's home health nurse is calling to report the the pt has crackling in her lower lobe and swelling in her extremities. Please call her back @ [phone]. Thanks, Coby

## 2022-12-20 ENCOUNTER — OFFICE VISIT (OUTPATIENT)
Dept: CARDIOLOGY | Facility: CLINIC | Age: 87
End: 2022-12-20
Payer: MEDICARE

## 2022-12-20 ENCOUNTER — HOSPITAL ENCOUNTER (OUTPATIENT)
Dept: CARDIOLOGY | Facility: CLINIC | Age: 87
Discharge: HOME | End: 2022-12-20
Payer: MEDICARE

## 2022-12-20 VITALS
WEIGHT: 150.38 LBS | BODY MASS INDEX: 25.67 KG/M2 | HEART RATE: 75 BPM | SYSTOLIC BLOOD PRESSURE: 142 MMHG | HEIGHT: 64 IN | DIASTOLIC BLOOD PRESSURE: 65 MMHG

## 2022-12-20 DIAGNOSIS — E78.2 MIXED HYPERLIPIDEMIA: ICD-10-CM

## 2022-12-20 DIAGNOSIS — D64.9 ANEMIA, UNSPECIFIED TYPE: ICD-10-CM

## 2022-12-20 DIAGNOSIS — R06.01 ORTHOPNEA: Primary | ICD-10-CM

## 2022-12-20 DIAGNOSIS — R60.0 LOWER EXTREMITY EDEMA: ICD-10-CM

## 2022-12-20 DIAGNOSIS — I10 PRIMARY HYPERTENSION: ICD-10-CM

## 2022-12-20 DIAGNOSIS — I48.0 AF (PAROXYSMAL ATRIAL FIBRILLATION): ICD-10-CM

## 2022-12-20 DIAGNOSIS — I48.0 PAROXYSMAL ATRIAL FIBRILLATION: ICD-10-CM

## 2022-12-20 DIAGNOSIS — I70.0 AORTIC ATHEROSCLEROSIS: ICD-10-CM

## 2022-12-20 DIAGNOSIS — I10 HYPERTENSION, ESSENTIAL: ICD-10-CM

## 2022-12-20 DIAGNOSIS — J43.8 OTHER EMPHYSEMA: ICD-10-CM

## 2022-12-20 PROCEDURE — 93010 EKG 12-LEAD: ICD-10-PCS | Mod: S$PBB,,, | Performed by: INTERNAL MEDICINE

## 2022-12-20 PROCEDURE — 99999 PR PBB SHADOW E&M-EST. PATIENT-LVL IV: CPT | Mod: PBBFAC,GC,, | Performed by: INTERNAL MEDICINE

## 2022-12-20 PROCEDURE — 93010 ELECTROCARDIOGRAM REPORT: CPT | Mod: S$PBB,,, | Performed by: INTERNAL MEDICINE

## 2022-12-20 PROCEDURE — 93005 ELECTROCARDIOGRAM TRACING: CPT | Mod: PBBFAC | Performed by: INTERNAL MEDICINE

## 2022-12-20 PROCEDURE — 99999 PR PBB SHADOW E&M-EST. PATIENT-LVL IV: ICD-10-PCS | Mod: PBBFAC,GC,, | Performed by: INTERNAL MEDICINE

## 2022-12-20 PROCEDURE — 99214 PR OFFICE/OUTPT VISIT, EST, LEVL IV, 30-39 MIN: ICD-10-PCS | Mod: S$PBB,GC,, | Performed by: INTERNAL MEDICINE

## 2022-12-20 PROCEDURE — 99214 OFFICE O/P EST MOD 30 MIN: CPT | Mod: S$PBB,GC,, | Performed by: INTERNAL MEDICINE

## 2022-12-20 PROCEDURE — 99214 OFFICE O/P EST MOD 30 MIN: CPT | Mod: PBBFAC | Performed by: INTERNAL MEDICINE

## 2022-12-20 RX ORDER — HYDROCHLOROTHIAZIDE 12.5 MG/1
12.5 TABLET ORAL DAILY
Qty: 90 TABLET | Refills: 3 | Status: SHIPPED | OUTPATIENT
Start: 2022-12-20 | End: 2023-01-01

## 2022-12-20 NOTE — PROGRESS NOTES
PCP - Bhargav Hirsch MD  Referring Physician:     Subjective:   Patient ID:  Selma Lux is a 87 y.o. female who presents for follow-up of Establish Care, Follow-up, and Shortness of Breath     Problems:  Paroxysmal atrial fibrillation  HTN  Hashimoto's thyroiditis  CVA - remote punctate, chronic microvascular change on MRI  Diastolic dysfunction, EF 60-65%  Carotid artery disease, LICA 20-39% in 2018  Aortic atherosclerosis by CXR   Seropositive RA  Cryptogenic organizing pneumonia on long term corticosteroids  pAF/RVR x 1 during hypoxic respiratory failure 2/2  in hospital 4/2018              -Negative event monitor 7/2018    HPI  She presents to the clinic today complaining of mild orthopnea and mild lower extremity edema.  She sleeps on 2 or more pillows at baseline however per her daughter she is dyspneic when she attempts to lie flat.  She has mild dyspnea on exertion as well however this is ongoing for quite some time.  She denies any chest discomfort such as pain, pressure, tightness at rest or exertion.  She also denies any lightheadedness, dizziness, or or syncope.  She is compliant with her medications.  She attempts to be active around the house.  She was recently seen by her primary cardiologist on 12/06/2022 after an abnormal echo however repeat showed her LV function to be normal.    History:     Social History     Tobacco Use    Smoking status: Never     Passive exposure: Never    Smokeless tobacco: Never   Substance Use Topics    Alcohol use: Yes     Alcohol/week: 0.0 standard drinks     Comment: very seldom      Family History   Problem Relation Age of Onset    Hypertension Mother     Heart disease Mother     Hyperthyroidism Mother     Prostate cancer Father         prostate cancer    Cancer Father     Breast cancer Maternal Grandmother     Hyperthyroidism Other     Colon cancer Neg Hx        Meds:   Review of patient's allergies indicates:  No Known Allergies    Current  Outpatient Medications:     acetaminophen (TYLENOL) 500 MG tablet, Take 1 tablet (500 mg total) by mouth every 4 (four) hours as needed for Pain. (Patient taking differently: Take 1,000 mg by mouth 2 (two) times daily as needed (Headache).), Disp: 60 tablet, Rfl: 0    albuterol (PROVENTIL) 2.5 mg /3 mL (0.083 %) nebulizer solution, , Disp: , Rfl:     albuterol-ipratropium (DUO-NEB) 2.5 mg-0.5 mg/3 mL nebulizer solution, Take 3 mLs by nebulization 2 (two) times daily as needed for Shortness of Breath. Rescue, Disp: , Rfl:     amLODIPine (NORVASC) 10 MG tablet, TAKE 1 TABLET(10 MG) BY MOUTH EVERY DAY, Disp: 90 tablet, Rfl: 3    apixaban (ELIQUIS) 2.5 mg Tab, Take 1 tablet (2.5 mg total) by mouth 2 (two) times daily., Disp: 180 tablet, Rfl: 3    atorvastatin (LIPITOR) 40 MG tablet, TAKE 1 TABLET(40 MG) BY MOUTH EVERY DAY (Patient taking differently: Take 40 mg by mouth every evening.), Disp: 90 tablet, Rfl: 3    baclofen (LIORESAL) 10 MG tablet, TAKE 1/2 TO 1 TABLET(5 TO 10 MG) BY MOUTH THREE TIMES DAILY AS NEEDED, Disp: 90 tablet, Rfl: 2    bisacodyL (DULCOLAX) 10 mg Supp, Place 1 suppository (10 mg total) rectally daily as needed (constipation)., Disp: 30 suppository, Rfl: 0    ferrous sulfate, dried (SLOW FE) 160 mg (50 mg iron) TbSR, Take 1 tablet (160 mg total) by mouth once daily., Disp: 90 tablet, Rfl: 3    gabapentin (NEURONTIN) 100 MG capsule, Take 1 capsule (100 mg total) by mouth 2 (two) times daily. Take one 100mg capsule twice a day and one 300mg capsule at bedtime, Disp: 180 capsule, Rfl: 2    gabapentin (NEURONTIN) 300 MG capsule, Take 1 capsule (300 mg total) by mouth every evening., Disp: 90 capsule, Rfl: 2    gabapentin 5% baclofen 2% amitriptyline 2% topical cream, Apply topically 3 (three) times daily., Disp: 240 g, Rfl: 2    LIDOcaine (LIDODERM) 5 %, Place 1 patch onto the skin once daily. Remove & Discard patch within 12 hours or as directed by MD (Patient taking differently: Place 1 patch onto  "the skin once daily. Remove & Discard patch within 12 hours as needed for pain or as directed by MD), Disp: 30 patch, Rfl: 2    magnesium oxide (MAG-OX) 400 mg (241.3 mg magnesium) tablet, Take 400 mg by mouth once daily., Disp: , Rfl:     montelukast (SINGULAIR) 10 mg tablet, Take 10 mg by mouth once daily., Disp: , Rfl:     mycophenolate (CELLCEPT) 500 mg Tab, TAKE 1 TABLET TWICE A DAY, Disp: 180 tablet, Rfl: 3    pantoprazole (PROTONIX) 40 MG tablet, Take 40 mg by mouth once daily., Disp: , Rfl:     predniSONE (DELTASONE) 10 MG tablet, Take 1 tablet (10 mg total) by mouth once daily., Disp: 30 tablet, Rfl: 2    thiamine 100 MG tablet, Take 100 mg by mouth once daily., Disp: , Rfl:     carvediloL (COREG) 3.125 MG tablet, Take 1 tablet (3.125 mg total) by mouth 2 (two) times daily with meals., Disp: 120 tablet, Rfl: 3    hydroCHLOROthiazide (HYDRODIURIL) 12.5 MG Tab, Take 1 tablet (12.5 mg total) by mouth once daily., Disp: 90 tablet, Rfl: 3    hydrOXYchloroQUINE (PLAQUENIL) 200 mg tablet, Take 1 tablet (200 mg total) by mouth 2 (two) times daily., Disp: 180 tablet, Rfl: 11    traZODone (DESYREL) 50 MG tablet, Take 50 mg by mouth every evening., Disp: , Rfl:     Current Facility-Administered Medications:     onabotulinumtoxina injection 200 Units, 200 Units, Intramuscular, Q90 Days, Baldomero Giang MD, 200 Units at 07/29/20 2303    Review of Systems   Constitutional:  Negative for fever.   Eyes:  Negative for blurred vision and double vision.   Respiratory:  Negative for shortness of breath.    Cardiovascular:  Positive for orthopnea and leg swelling. Negative for chest pain, palpitations, claudication and PND.   Gastrointestinal:  Negative for abdominal pain, nausea and vomiting.   Neurological:  Negative for loss of consciousness and weakness.     Objective:   BP (!) 142/65 (BP Location: Right arm, Patient Position: Sitting)   Pulse 75   Ht 5' 4" (1.626 m)   Wt 68.2 kg (150 lb 5.7 oz)   LMP  (LMP Unknown)  "  BMI 25.81 kg/m²   Physical Exam  Constitutional:       General: She is not in acute distress.     Appearance: She is not diaphoretic.   HENT:      Head: Normocephalic and atraumatic.      Right Ear: External ear normal.      Left Ear: External ear normal.   Neck:      Thyroid: No thyromegaly.      Trachea: No tracheal deviation.   Cardiovascular:      Rate and Rhythm: Normal rate.      Pulses:           Carotid pulses are 2+ on the right side and 2+ on the left side.       Radial pulses are 2+ on the right side and 2+ on the left side.        Dorsalis pedis pulses are 1+ on the right side and 1+ on the left side.        Posterior tibial pulses are 1+ on the right side and 1+ on the left side.      Heart sounds: Normal heart sounds. No murmur heard.    No friction rub. No gallop.   Pulmonary:      Effort: No respiratory distress.      Breath sounds: No wheezing.   Abdominal:      General: There is no distension.      Tenderness: There is no abdominal tenderness. There is no rebound.   Musculoskeletal:         General: No tenderness or deformity. Normal range of motion.      Right lower le+ Edema present.      Left lower le+ Edema present.   Lymphadenopathy:      Cervical: No cervical adenopathy.   Skin:     Findings: No erythema or rash.   Neurological:      Mental Status: She is alert and oriented to person, place, and time.       Labs:     Lab Results   Component Value Date     10/13/2022    K 4.3 10/13/2022     10/13/2022    CO2 18 (L) 10/13/2022    BUN 16 10/13/2022    CREATININE 0.8 10/13/2022    ANIONGAP 11 10/13/2022     Lab Results   Component Value Date    HGBA1C 5.3 2022     Lab Results   Component Value Date     (H) 10/11/2022     (H) 2022    BNP 70 2019       Lab Results   Component Value Date    WBC 8.75 10/13/2022    HGB 8.6 (L) 10/13/2022    HCT 28.2 (L) 10/13/2022    HCT 36 2022    PLT 85 (L) 10/13/2022    GRAN 4.6 10/13/2022    GRAN 51.9  10/13/2022     Lab Results   Component Value Date    CHOL 120 06/19/2022    HDL 65 06/19/2022    LDLCALC 45.0 (L) 06/19/2022    TRIG 50 06/19/2022       Lab Results   Component Value Date     10/13/2022    K 4.3 10/13/2022     10/13/2022    CO2 18 (L) 10/13/2022    BUN 16 10/13/2022    CREATININE 0.8 10/13/2022    ANIONGAP 11 10/13/2022     Lab Results   Component Value Date    HGBA1C 5.3 06/20/2022     Lab Results   Component Value Date     (H) 10/11/2022     (H) 01/23/2022    BNP 70 08/26/2019    Lab Results   Component Value Date    WBC 8.75 10/13/2022    HGB 8.6 (L) 10/13/2022    HCT 28.2 (L) 10/13/2022    HCT 36 06/19/2022    PLT 85 (L) 10/13/2022    GRAN 4.6 10/13/2022    GRAN 51.9 10/13/2022     Lab Results   Component Value Date    CHOL 120 06/19/2022    HDL 65 06/19/2022    LDLCALC 45.0 (L) 06/19/2022    TRIG 50 06/19/2022                Cardiovascular Imaging:     Echo 12/16/2022:   Summary    The left ventricle is normal in size with concentric hypertrophy and normal systolic function. The estimated ejection fraction is 65%.  Normal right ventricular size with normal right ventricular systolic function.  Grade II left ventricular diastolic dysfunction.  Severe left atrial enlargement.  Mild-to-moderate mitral regurgitation.  The estimated PA systolic pressure is 43 mmHg.  Normal central venous pressure (3 mmHg).    Assessment & Plan:     1. Orthopnea    2. Anemia, unspecified type    3. Mixed hyperlipidemia    4. Aortic atherosclerosis    5. AF (paroxysmal atrial fibrillation)    6. Hypertension, essential    7. Other emphysema    8. Lower extremity edema        Plan:  -will add low-dose hydrochlorothiazide 12.5 mg daily  -will continue other medical management  -encouraged exercise as tolerated  -she will need to follow-up with her primary cardiologist    Signed:  Mina Farah M.D.  Page # [phone]  Cardiovascular Fellow  [facility]

## 2022-12-23 ENCOUNTER — PES CALL (OUTPATIENT)
Dept: ADMINISTRATIVE | Facility: CLINIC | Age: 87
End: 2022-12-23
Payer: MEDICARE

## 2022-12-26 PROBLEM — R06.01 ORTHOPNEA: Status: ACTIVE | Noted: 2019-08-30

## 2022-12-26 PROBLEM — R60.0 LOWER EXTREMITY EDEMA: Status: ACTIVE | Noted: 2022-12-26

## 2022-12-28 ENCOUNTER — IMMUNIZATION (OUTPATIENT)
Dept: INTERNAL MEDICINE | Facility: CLINIC | Age: 87
End: 2022-12-28
Payer: MEDICARE

## 2022-12-28 DIAGNOSIS — Z23 NEED FOR VACCINATION: Primary | ICD-10-CM

## 2022-12-28 PROCEDURE — 91312 COVID-19, MRNA, LNP-S, BIVALENT BOOSTER, PF, 30 MCG/0.3 ML DOSE: CPT | Mod: PBBFAC

## 2022-12-28 PROCEDURE — 0124A COVID-19, MRNA, LNP-S, BIVALENT BOOSTER, PF, 30 MCG/0.3 ML DOSE: CPT | Mod: PBBFAC

## 2022-12-31 ENCOUNTER — EXTERNAL CHRONIC CARE MANAGEMENT (OUTPATIENT)
Dept: PRIMARY CARE CLINIC | Facility: CLINIC | Age: 87
End: 2022-12-31
Payer: MEDICARE

## 2022-12-31 PROCEDURE — 99490 PR CHRONIC CARE MGMT, 1ST 20 MIN: ICD-10-PCS | Mod: S$PBB,,, | Performed by: FAMILY MEDICINE

## 2022-12-31 PROCEDURE — 99490 CHRNC CARE MGMT STAFF 1ST 20: CPT | Mod: S$PBB,,, | Performed by: FAMILY MEDICINE

## 2022-12-31 PROCEDURE — 99490 CHRNC CARE MGMT STAFF 1ST 20: CPT | Mod: PBBFAC | Performed by: FAMILY MEDICINE

## 2023-01-01 ENCOUNTER — OUTPATIENT CASE MANAGEMENT (OUTPATIENT)
Dept: NEUROLOGY | Facility: CLINIC | Age: 88
End: 2023-01-01
Payer: MEDICARE

## 2023-01-01 ENCOUNTER — TELEPHONE (OUTPATIENT)
Dept: NEUROLOGY | Facility: CLINIC | Age: 88
End: 2023-01-01
Payer: MEDICARE

## 2023-01-01 ENCOUNTER — HOSPITAL ENCOUNTER (OUTPATIENT)
Dept: RADIOLOGY | Facility: OTHER | Age: 88
Discharge: HOME | End: 2023-03-07
Attending: INTERNAL MEDICINE
Payer: MEDICARE

## 2023-01-01 ENCOUNTER — TELEPHONE (OUTPATIENT)
Dept: HOME HEALTH SERVICES | Facility: CLINIC | Age: 88
End: 2023-01-01
Payer: MEDICARE

## 2023-01-01 ENCOUNTER — TELEPHONE (OUTPATIENT)
Dept: PRIMARY CARE CLINIC | Facility: CLINIC | Age: 88
End: 2023-01-01
Payer: MEDICARE

## 2023-01-01 ENCOUNTER — HOSPITAL ENCOUNTER (OUTPATIENT)
Dept: RADIOLOGY | Facility: OTHER | Age: 88
Discharge: HOME | End: 2023-04-06
Attending: INTERNAL MEDICINE
Payer: MEDICARE

## 2023-01-01 ENCOUNTER — OUTPATIENT CASE MANAGEMENT (OUTPATIENT)
Dept: ADMINISTRATIVE | Facility: OTHER | Age: 88
End: 2023-01-01
Payer: MEDICARE

## 2023-01-01 ENCOUNTER — LAB VISIT (OUTPATIENT)
Dept: LAB | Facility: HOSPITAL | Age: 88
End: 2023-01-01
Attending: NURSE PRACTITIONER
Payer: MEDICARE

## 2023-01-01 ENCOUNTER — TELEPHONE (OUTPATIENT)
Dept: PRIMARY CARE CLINIC | Facility: CLINIC | Age: 88
End: 2023-01-01

## 2023-01-01 ENCOUNTER — DOCUMENT SCAN (OUTPATIENT)
Dept: HOME HEALTH SERVICES | Facility: HOSPITAL | Age: 88
End: 2023-01-01
Payer: MEDICARE

## 2023-01-01 ENCOUNTER — EXTERNAL CHRONIC CARE MANAGEMENT (OUTPATIENT)
Dept: PRIMARY CARE CLINIC | Facility: CLINIC | Age: 88
End: 2023-01-01
Payer: MEDICARE

## 2023-01-01 ENCOUNTER — TELEPHONE (OUTPATIENT)
Dept: INTERNAL MEDICINE | Facility: CLINIC | Age: 88
End: 2023-01-01

## 2023-01-01 ENCOUNTER — PATIENT MESSAGE (OUTPATIENT)
Dept: PRIMARY CARE CLINIC | Facility: CLINIC | Age: 88
End: 2023-01-01
Payer: MEDICARE

## 2023-01-01 ENCOUNTER — HOSPITAL ENCOUNTER (INPATIENT)
Facility: HOSPITAL | Age: 88
LOS: 7 days | Discharge: NURSING, REHAB OR LONG-TERM CARE | DRG: 871 | End: 2023-10-16
Attending: EMERGENCY MEDICINE | Admitting: HOSPITALIST
Payer: MEDICARE

## 2023-01-01 ENCOUNTER — TELEPHONE (OUTPATIENT)
Dept: NEPHROLOGY | Facility: CLINIC | Age: 88
End: 2023-01-01
Payer: MEDICARE

## 2023-01-01 ENCOUNTER — EXTERNAL HOME HEALTH (OUTPATIENT)
Dept: HOME HEALTH SERVICES | Facility: HOSPITAL | Age: 88
End: 2023-01-01
Payer: MEDICARE

## 2023-01-01 ENCOUNTER — OFFICE VISIT (OUTPATIENT)
Dept: PRIMARY CARE CLINIC | Facility: CLINIC | Age: 88
DRG: 640 | End: 2023-01-01
Payer: MEDICARE

## 2023-01-01 ENCOUNTER — HOSPITAL ENCOUNTER (OUTPATIENT)
Dept: RADIOLOGY | Facility: OTHER | Age: 88
Discharge: HOME | End: 2023-02-22
Attending: STUDENT IN AN ORGANIZED HEALTH CARE EDUCATION/TRAINING PROGRAM
Payer: MEDICARE

## 2023-01-01 ENCOUNTER — LAB VISIT (OUTPATIENT)
Dept: LAB | Facility: HOSPITAL | Age: 88
End: 2023-01-01
Payer: MEDICARE

## 2023-01-01 ENCOUNTER — CARE AT HOME (OUTPATIENT)
Dept: HOME HEALTH SERVICES | Facility: CLINIC | Age: 88
End: 2023-01-01
Payer: MEDICARE

## 2023-01-01 ENCOUNTER — EXTERNAL CHRONIC CARE MANAGEMENT (OUTPATIENT)
Dept: PRIMARY CARE CLINIC | Facility: CLINIC | Age: 88
DRG: 683 | End: 2023-01-01
Payer: MEDICARE

## 2023-01-01 ENCOUNTER — LAB VISIT (OUTPATIENT)
Dept: LAB | Facility: OTHER | Age: 88
End: 2023-01-01
Attending: NURSE PRACTITIONER
Payer: MEDICARE

## 2023-01-01 ENCOUNTER — OFFICE VISIT (OUTPATIENT)
Dept: RHEUMATOLOGY | Facility: CLINIC | Age: 88
End: 2023-01-01
Payer: MEDICARE

## 2023-01-01 ENCOUNTER — OFFICE VISIT (OUTPATIENT)
Dept: PRIMARY CARE CLINIC | Facility: CLINIC | Age: 88
End: 2023-01-01
Payer: MEDICARE

## 2023-01-01 ENCOUNTER — DOCUMENTATION ONLY (OUTPATIENT)
Dept: NEUROLOGY | Facility: CLINIC | Age: 88
End: 2023-01-01
Payer: MEDICARE

## 2023-01-01 ENCOUNTER — E-CONSULT (OUTPATIENT)
Dept: INFECTIOUS DISEASES | Facility: HOSPITAL | Age: 88
End: 2023-01-01
Payer: MEDICARE

## 2023-01-01 ENCOUNTER — HOSPITAL ENCOUNTER (INPATIENT)
Facility: HOSPITAL | Age: 88
LOS: 12 days | Discharge: NURSING, REHAB OR LONG-TERM CARE | DRG: 683 | End: 2023-09-13
Attending: EMERGENCY MEDICINE | Admitting: HOSPITALIST
Payer: MEDICARE

## 2023-01-01 ENCOUNTER — TELEPHONE (OUTPATIENT)
Dept: HOME HEALTH SERVICES | Facility: CLINIC | Age: 88
End: 2023-01-01

## 2023-01-01 ENCOUNTER — LAB VISIT (OUTPATIENT)
Dept: LAB | Facility: OTHER | Age: 88
End: 2023-01-01
Attending: FAMILY MEDICINE
Payer: MEDICARE

## 2023-01-01 ENCOUNTER — OFFICE VISIT (OUTPATIENT)
Dept: PRIMARY CARE CLINIC | Facility: CLINIC | Age: 88
DRG: 683 | End: 2023-01-01
Attending: EMERGENCY MEDICINE
Payer: MEDICARE

## 2023-01-01 ENCOUNTER — PATIENT MESSAGE (OUTPATIENT)
Dept: ADMINISTRATIVE | Facility: OTHER | Age: 88
End: 2023-01-01
Payer: MEDICARE

## 2023-01-01 ENCOUNTER — OFFICE VISIT (OUTPATIENT)
Dept: NEPHROLOGY | Facility: CLINIC | Age: 88
End: 2023-01-01
Attending: FAMILY MEDICINE
Payer: MEDICARE

## 2023-01-01 ENCOUNTER — OFFICE VISIT (OUTPATIENT)
Dept: CARDIOLOGY | Facility: CLINIC | Age: 88
End: 2023-01-01
Payer: MEDICARE

## 2023-01-01 ENCOUNTER — HOSPITAL ENCOUNTER (INPATIENT)
Facility: HOSPITAL | Age: 88
LOS: 3 days | Discharge: NURSING, REHAB OR LONG-TERM CARE | DRG: 640 | End: 2023-05-17
Attending: STUDENT IN AN ORGANIZED HEALTH CARE EDUCATION/TRAINING PROGRAM | Admitting: STUDENT IN AN ORGANIZED HEALTH CARE EDUCATION/TRAINING PROGRAM
Payer: MEDICARE

## 2023-01-01 ENCOUNTER — TELEPHONE (OUTPATIENT)
Dept: INTERNAL MEDICINE | Facility: CLINIC | Age: 88
End: 2023-01-01
Payer: MEDICARE

## 2023-01-01 VITALS
WEIGHT: 151 LBS | HEIGHT: 64 IN | OXYGEN SATURATION: 99 % | SYSTOLIC BLOOD PRESSURE: 134 MMHG | BODY MASS INDEX: 25.78 KG/M2 | DIASTOLIC BLOOD PRESSURE: 62 MMHG | HEART RATE: 86 BPM

## 2023-01-01 VITALS
BODY MASS INDEX: 27.44 KG/M2 | OXYGEN SATURATION: 99 % | HEIGHT: 64 IN | SYSTOLIC BLOOD PRESSURE: 105 MMHG | DIASTOLIC BLOOD PRESSURE: 51 MMHG | TEMPERATURE: 98 F | HEART RATE: 84 BPM

## 2023-01-01 VITALS
TEMPERATURE: 98 F | RESPIRATION RATE: 18 BRPM | HEART RATE: 83 BPM | WEIGHT: 141.56 LBS | SYSTOLIC BLOOD PRESSURE: 124 MMHG | BODY MASS INDEX: 24.17 KG/M2 | DIASTOLIC BLOOD PRESSURE: 58 MMHG | HEIGHT: 64 IN | OXYGEN SATURATION: 100 %

## 2023-01-01 VITALS
HEIGHT: 64 IN | TEMPERATURE: 98 F | DIASTOLIC BLOOD PRESSURE: 64 MMHG | OXYGEN SATURATION: 100 % | SYSTOLIC BLOOD PRESSURE: 140 MMHG | HEART RATE: 77 BPM | BODY MASS INDEX: 25.73 KG/M2

## 2023-01-01 VITALS
SYSTOLIC BLOOD PRESSURE: 136 MMHG | HEART RATE: 71 BPM | OXYGEN SATURATION: 98 % | DIASTOLIC BLOOD PRESSURE: 80 MMHG | RESPIRATION RATE: 20 BRPM | TEMPERATURE: 98 F

## 2023-01-01 VITALS
HEART RATE: 87 BPM | DIASTOLIC BLOOD PRESSURE: 60 MMHG | BODY MASS INDEX: 27.28 KG/M2 | SYSTOLIC BLOOD PRESSURE: 127 MMHG | TEMPERATURE: 97 F | RESPIRATION RATE: 14 BRPM | HEIGHT: 64 IN | WEIGHT: 159.81 LBS | OXYGEN SATURATION: 95 %

## 2023-01-01 VITALS
SYSTOLIC BLOOD PRESSURE: 128 MMHG | DIASTOLIC BLOOD PRESSURE: 68 MMHG | HEART RATE: 77 BPM | TEMPERATURE: 98 F | OXYGEN SATURATION: 97 % | RESPIRATION RATE: 14 BRPM

## 2023-01-01 VITALS
BODY MASS INDEX: 26.98 KG/M2 | SYSTOLIC BLOOD PRESSURE: 114 MMHG | DIASTOLIC BLOOD PRESSURE: 89 MMHG | RESPIRATION RATE: 18 BRPM | HEART RATE: 45 BPM | OXYGEN SATURATION: 96 % | TEMPERATURE: 99 F | WEIGHT: 158 LBS | HEIGHT: 64 IN

## 2023-01-01 VITALS
RESPIRATION RATE: 20 BRPM | DIASTOLIC BLOOD PRESSURE: 74 MMHG | OXYGEN SATURATION: 98 % | HEART RATE: 71 BPM | SYSTOLIC BLOOD PRESSURE: 122 MMHG | TEMPERATURE: 98 F

## 2023-01-01 VITALS — HEART RATE: 85 BPM | SYSTOLIC BLOOD PRESSURE: 95 MMHG | DIASTOLIC BLOOD PRESSURE: 60 MMHG

## 2023-01-01 VITALS
OXYGEN SATURATION: 95 % | BODY MASS INDEX: 25.6 KG/M2 | DIASTOLIC BLOOD PRESSURE: 69 MMHG | WEIGHT: 149.94 LBS | HEART RATE: 82 BPM | SYSTOLIC BLOOD PRESSURE: 113 MMHG | HEIGHT: 64 IN

## 2023-01-01 DIAGNOSIS — D50.8 IRON DEFICIENCY ANEMIA SECONDARY TO INADEQUATE DIETARY IRON INTAKE: ICD-10-CM

## 2023-01-01 DIAGNOSIS — R07.9 CHEST PAIN: ICD-10-CM

## 2023-01-01 DIAGNOSIS — D84.821 IMMUNOCOMPROMISED STATE DUE TO DRUG THERAPY: ICD-10-CM

## 2023-01-01 DIAGNOSIS — I50.30 HEART FAILURE WITH PRESERVED EJECTION FRACTION, UNSPECIFIED HF CHRONICITY: ICD-10-CM

## 2023-01-01 DIAGNOSIS — R41.0 DELIRIUM: ICD-10-CM

## 2023-01-01 DIAGNOSIS — R41.82 ALTERED MENTAL STATUS, UNSPECIFIED ALTERED MENTAL STATUS TYPE: Primary | ICD-10-CM

## 2023-01-01 DIAGNOSIS — M21.379 FOOT-DROP, UNSPECIFIED LATERALITY: ICD-10-CM

## 2023-01-01 DIAGNOSIS — R53.1 GENERALIZED WEAKNESS: ICD-10-CM

## 2023-01-01 DIAGNOSIS — G47.00 INSOMNIA, UNSPECIFIED TYPE: Primary | ICD-10-CM

## 2023-01-01 DIAGNOSIS — M47.812 SPONDYLOSIS OF CERVICAL REGION WITHOUT MYELOPATHY OR RADICULOPATHY: ICD-10-CM

## 2023-01-01 DIAGNOSIS — I10 HYPERTENSION, ESSENTIAL: Chronic | ICD-10-CM

## 2023-01-01 DIAGNOSIS — I48.0 AF (PAROXYSMAL ATRIAL FIBRILLATION): ICD-10-CM

## 2023-01-01 DIAGNOSIS — N17.9 AKI (ACUTE KIDNEY INJURY): ICD-10-CM

## 2023-01-01 DIAGNOSIS — I63.311 CEREBRAL INFARCTION DUE TO THROMBOSIS OF RIGHT MIDDLE CEREBRAL ARTERY: ICD-10-CM

## 2023-01-01 DIAGNOSIS — Z75.8 DISCHARGE PLANNING ISSUES: ICD-10-CM

## 2023-01-01 DIAGNOSIS — J84.116 CRYPTOGENIC ORGANIZING PNEUMONIA: ICD-10-CM

## 2023-01-01 DIAGNOSIS — M48.00 SPINAL STENOSIS, UNSPECIFIED SPINAL REGION: ICD-10-CM

## 2023-01-01 DIAGNOSIS — R53.1 GENERALIZED WEAKNESS: Primary | ICD-10-CM

## 2023-01-01 DIAGNOSIS — R41.82 ALTERED MENTAL STATUS, UNSPECIFIED ALTERED MENTAL STATUS TYPE: ICD-10-CM

## 2023-01-01 DIAGNOSIS — E78.2 MIXED HYPERLIPIDEMIA: ICD-10-CM

## 2023-01-01 DIAGNOSIS — R79.9 ABNORMAL BLOOD CHEMISTRY: ICD-10-CM

## 2023-01-01 DIAGNOSIS — I95.9 HYPOTENSION, UNSPECIFIED HYPOTENSION TYPE: ICD-10-CM

## 2023-01-01 DIAGNOSIS — R06.01 ORTHOPNEA: ICD-10-CM

## 2023-01-01 DIAGNOSIS — J84.9 PULMONARY HYPERTENSION DUE TO INTERSTITIAL LUNG DISEASE: ICD-10-CM

## 2023-01-01 DIAGNOSIS — I63.311 CEREBRAL INFARCTION DUE TO THROMBOSIS OF RIGHT MIDDLE CEREBRAL ARTERY: Primary | ICD-10-CM

## 2023-01-01 DIAGNOSIS — F01.A0 MILD VASCULAR DEMENTIA WITHOUT BEHAVIORAL DISTURBANCE, PSYCHOTIC DISTURBANCE, MOOD DISTURBANCE, OR ANXIETY: Chronic | ICD-10-CM

## 2023-01-01 DIAGNOSIS — G25.5 HEMICHOREA: ICD-10-CM

## 2023-01-01 DIAGNOSIS — J44.9 CHRONIC OBSTRUCTIVE PULMONARY DISEASE, UNSPECIFIED COPD TYPE: ICD-10-CM

## 2023-01-01 DIAGNOSIS — I69.154 HEMIPLEGIA AND HEMIPARESIS FOLLOWING NONTRAUMATIC INTRACEREBRAL HEMORRHAGE AFFECTING LEFT NON-DOMINANT SIDE: Chronic | ICD-10-CM

## 2023-01-01 DIAGNOSIS — M05.79 SEROPOSITIVE RHEUMATOID ARTHRITIS OF MULTIPLE SITES: ICD-10-CM

## 2023-01-01 DIAGNOSIS — R54 AGE-RELATED PHYSICAL DEBILITY: Chronic | ICD-10-CM

## 2023-01-01 DIAGNOSIS — E87.1 HYPONATREMIA: ICD-10-CM

## 2023-01-01 DIAGNOSIS — R54 AGE-RELATED PHYSICAL DEBILITY: ICD-10-CM

## 2023-01-01 DIAGNOSIS — I48.0 AF (PAROXYSMAL ATRIAL FIBRILLATION): Chronic | ICD-10-CM

## 2023-01-01 DIAGNOSIS — G93.40 ENCEPHALOPATHY ACUTE: ICD-10-CM

## 2023-01-01 DIAGNOSIS — G47.00 INSOMNIA, UNSPECIFIED TYPE: ICD-10-CM

## 2023-01-01 DIAGNOSIS — F01.A0 MILD VASCULAR DEMENTIA WITHOUT BEHAVIORAL DISTURBANCE, PSYCHOTIC DISTURBANCE, MOOD DISTURBANCE, OR ANXIETY: ICD-10-CM

## 2023-01-01 DIAGNOSIS — I27.23 PULMONARY HYPERTENSION DUE TO INTERSTITIAL LUNG DISEASE: Chronic | ICD-10-CM

## 2023-01-01 DIAGNOSIS — E04.2 MULTINODULAR GOITER: ICD-10-CM

## 2023-01-01 DIAGNOSIS — M43.6: Primary | ICD-10-CM

## 2023-01-01 DIAGNOSIS — M16.4 POST-TRAUMATIC OSTEOARTHRITIS OF BOTH HIPS: Chronic | ICD-10-CM

## 2023-01-01 DIAGNOSIS — R54 AGE-RELATED PHYSICAL DEBILITY: Primary | Chronic | ICD-10-CM

## 2023-01-01 DIAGNOSIS — M05.79 SEROPOSITIVE RHEUMATOID ARTHRITIS OF MULTIPLE SITES: Chronic | ICD-10-CM

## 2023-01-01 DIAGNOSIS — E27.2 ADRENAL CRISIS: ICD-10-CM

## 2023-01-01 DIAGNOSIS — R41.82 AMS (ALTERED MENTAL STATUS): ICD-10-CM

## 2023-01-01 DIAGNOSIS — M47.816 SPONDYLOSIS OF LUMBAR REGION WITHOUT MYELOPATHY OR RADICULOPATHY: ICD-10-CM

## 2023-01-01 DIAGNOSIS — J84.116 CRYPTOGENIC ORGANIZING PNEUMONIA: Primary | ICD-10-CM

## 2023-01-01 DIAGNOSIS — I69.154 HEMIPLEGIA AND HEMIPARESIS FOLLOWING NONTRAUMATIC INTRACEREBRAL HEMORRHAGE AFFECTING LEFT NON-DOMINANT SIDE: ICD-10-CM

## 2023-01-01 DIAGNOSIS — J44.9 CHRONIC OBSTRUCTIVE PULMONARY DISEASE, UNSPECIFIED COPD TYPE: Chronic | ICD-10-CM

## 2023-01-01 DIAGNOSIS — Z79.899 IMMUNOCOMPROMISED STATE DUE TO DRUG THERAPY: ICD-10-CM

## 2023-01-01 DIAGNOSIS — G93.40 ACUTE ENCEPHALOPATHY: ICD-10-CM

## 2023-01-01 DIAGNOSIS — I67.89 CEREBRAL MICROVASCULAR DISEASE: ICD-10-CM

## 2023-01-01 DIAGNOSIS — Z79.52 LONG TERM SYSTEMIC STEROID USER: ICD-10-CM

## 2023-01-01 DIAGNOSIS — D50.8 IRON DEFICIENCY ANEMIA SECONDARY TO INADEQUATE DIETARY IRON INTAKE: Chronic | ICD-10-CM

## 2023-01-01 DIAGNOSIS — Z79.899 IMMUNOCOMPROMISED STATE DUE TO DRUG THERAPY: Primary | ICD-10-CM

## 2023-01-01 DIAGNOSIS — Z79.899 HIGH RISK MEDICATION USE: Chronic | ICD-10-CM

## 2023-01-01 DIAGNOSIS — Z79.52 CURRENT CHRONIC USE OF SYSTEMIC STEROIDS: Primary | ICD-10-CM

## 2023-01-01 DIAGNOSIS — M43.6: ICD-10-CM

## 2023-01-01 DIAGNOSIS — J43.8 OTHER EMPHYSEMA: ICD-10-CM

## 2023-01-01 DIAGNOSIS — S06.5XAA SDH (SUBDURAL HEMATOMA): ICD-10-CM

## 2023-01-01 DIAGNOSIS — M05.79 SEROPOSITIVE RHEUMATOID ARTHRITIS OF MULTIPLE SITES: Primary | ICD-10-CM

## 2023-01-01 DIAGNOSIS — F01.50 VASCULAR DEMENTIA, UNSPECIFIED DEMENTIA SEVERITY, UNSPECIFIED WHETHER BEHAVIORAL, PSYCHOTIC, OR MOOD DISTURBANCE OR ANXIETY: ICD-10-CM

## 2023-01-01 DIAGNOSIS — I27.23 PULMONARY HYPERTENSION DUE TO INTERSTITIAL LUNG DISEASE: ICD-10-CM

## 2023-01-01 DIAGNOSIS — A41.9 SEPSIS: ICD-10-CM

## 2023-01-01 DIAGNOSIS — D69.6 THROMBOCYTOPENIA: Chronic | ICD-10-CM

## 2023-01-01 DIAGNOSIS — J84.9 PULMONARY HYPERTENSION DUE TO INTERSTITIAL LUNG DISEASE: Chronic | ICD-10-CM

## 2023-01-01 DIAGNOSIS — Z79.01 ANTICOAGULANT LONG-TERM USE: ICD-10-CM

## 2023-01-01 DIAGNOSIS — I10 HYPERTENSION, UNSPECIFIED TYPE: ICD-10-CM

## 2023-01-01 DIAGNOSIS — I70.0 AORTIC ATHEROSCLEROSIS: ICD-10-CM

## 2023-01-01 DIAGNOSIS — M85.80 OSTEOPENIA, UNSPECIFIED LOCATION: ICD-10-CM

## 2023-01-01 DIAGNOSIS — M47.816 LUMBAR SPONDYLOSIS: Primary | ICD-10-CM

## 2023-01-01 DIAGNOSIS — E87.1 HYPONATREMIA: Primary | ICD-10-CM

## 2023-01-01 DIAGNOSIS — I63.9 OCCIPITAL CEREBRAL INFARCTION: ICD-10-CM

## 2023-01-01 DIAGNOSIS — R79.9 ABNORMAL BLOOD CHEMISTRY: Primary | ICD-10-CM

## 2023-01-01 DIAGNOSIS — I63.9 STROKE: ICD-10-CM

## 2023-01-01 DIAGNOSIS — L24.A2 IRRITANT CONTACT DERMATITIS DUE TO FECAL, URINARY OR DUAL INCONTINENCE: ICD-10-CM

## 2023-01-01 DIAGNOSIS — I95.9 HYPOTENSION: ICD-10-CM

## 2023-01-01 DIAGNOSIS — G93.40 ACUTE ENCEPHALOPATHY: Primary | ICD-10-CM

## 2023-01-01 DIAGNOSIS — I10 HYPERTENSION, ESSENTIAL: Primary | ICD-10-CM

## 2023-01-01 DIAGNOSIS — E78.2 MIXED HYPERLIPIDEMIA: Chronic | ICD-10-CM

## 2023-01-01 DIAGNOSIS — R11.2 NAUSEA AND VOMITING, UNSPECIFIED VOMITING TYPE: ICD-10-CM

## 2023-01-01 DIAGNOSIS — D84.821 IMMUNOCOMPROMISED STATE DUE TO DRUG THERAPY: Primary | ICD-10-CM

## 2023-01-01 DIAGNOSIS — R00.0 TACHYCARDIA: ICD-10-CM

## 2023-01-01 LAB
1,3 BETA GLUCAN SER-MCNC: <31 PG/ML
ACANTHOCYTES BLD QL SMEAR: PRESENT
ACINETOBACTER CALCOACETICUS/BAUMANNII COMPLEX: NOT DETECTED
ADENOVIRUS: NOT DETECTED
ALBUMIN SERPL BCP-MCNC: 2.6 G/DL (ref 3.5–5.2)
ALBUMIN SERPL BCP-MCNC: 2.8 G/DL (ref 3.5–5.2)
ALBUMIN SERPL BCP-MCNC: 3 G/DL (ref 3.5–5.2)
ALBUMIN SERPL BCP-MCNC: 3.1 G/DL (ref 3.5–5.2)
ALBUMIN SERPL BCP-MCNC: 3.2 G/DL (ref 3.5–5.2)
ALBUMIN SERPL BCP-MCNC: 3.2 G/DL (ref 3.5–5.2)
ALBUMIN SERPL BCP-MCNC: 3.3 G/DL (ref 3.5–5.2)
ALBUMIN SERPL BCP-MCNC: 3.3 G/DL (ref 3.5–5.2)
ALBUMIN SERPL BCP-MCNC: 3.4 G/DL (ref 3.5–5.2)
ALBUMIN SERPL BCP-MCNC: 3.7 G/DL (ref 3.5–5.2)
ALBUMIN SERPL BCP-MCNC: 3.8 G/DL (ref 3.5–5.2)
ALBUMIN SERPL BCP-MCNC: 3.8 G/DL (ref 3.5–5.2)
ALLENS TEST: ABNORMAL
ALLENS TEST: NORMAL
ALLENS TEST: NORMAL
ALP SERPL-CCNC: 44 U/L (ref 55–135)
ALP SERPL-CCNC: 49 U/L (ref 55–135)
ALP SERPL-CCNC: 50 U/L (ref 55–135)
ALP SERPL-CCNC: 50 U/L (ref 55–135)
ALP SERPL-CCNC: 53 U/L (ref 55–135)
ALP SERPL-CCNC: 53 U/L (ref 55–135)
ALP SERPL-CCNC: 54 U/L (ref 55–135)
ALP SERPL-CCNC: 54 U/L (ref 55–135)
ALP SERPL-CCNC: 55 U/L (ref 55–135)
ALP SERPL-CCNC: 56 U/L (ref 55–135)
ALP SERPL-CCNC: 57 U/L (ref 55–135)
ALP SERPL-CCNC: 58 U/L (ref 55–135)
ALP SERPL-CCNC: 59 U/L (ref 55–135)
ALP SERPL-CCNC: 59 U/L (ref 55–135)
ALP SERPL-CCNC: 60 U/L (ref 55–135)
ALP SERPL-CCNC: 62 U/L (ref 55–135)
ALT SERPL W/O P-5'-P-CCNC: 10 U/L (ref 10–44)
ALT SERPL W/O P-5'-P-CCNC: 10 U/L (ref 10–44)
ALT SERPL W/O P-5'-P-CCNC: 11 U/L (ref 10–44)
ALT SERPL W/O P-5'-P-CCNC: 12 U/L (ref 10–44)
ALT SERPL W/O P-5'-P-CCNC: 15 U/L (ref 10–44)
ALT SERPL W/O P-5'-P-CCNC: 18 U/L (ref 10–44)
ALT SERPL W/O P-5'-P-CCNC: 5 U/L (ref 10–44)
ALT SERPL W/O P-5'-P-CCNC: 6 U/L (ref 10–44)
ALT SERPL W/O P-5'-P-CCNC: 7 U/L (ref 10–44)
ALT SERPL W/O P-5'-P-CCNC: 7 U/L (ref 10–44)
ALT SERPL W/O P-5'-P-CCNC: 8 U/L (ref 10–44)
ALT SERPL W/O P-5'-P-CCNC: 8 U/L (ref 10–44)
ALT SERPL W/O P-5'-P-CCNC: 9 U/L (ref 10–44)
ALT SERPL W/O P-5'-P-CCNC: 9 U/L (ref 10–44)
AMPHET+METHAMPHET UR QL: NEGATIVE
ANION GAP SERPL CALC-SCNC: 10 MMOL/L (ref 8–16)
ANION GAP SERPL CALC-SCNC: 10 MMOL/L (ref 8–16)
ANION GAP SERPL CALC-SCNC: 11 MMOL/L (ref 8–16)
ANION GAP SERPL CALC-SCNC: 13 MMOL/L (ref 8–16)
ANION GAP SERPL CALC-SCNC: 6 MMOL/L (ref 8–16)
ANION GAP SERPL CALC-SCNC: 7 MMOL/L (ref 8–16)
ANION GAP SERPL CALC-SCNC: 8 MMOL/L (ref 8–16)
ANION GAP SERPL CALC-SCNC: 9 MMOL/L (ref 8–16)
ANISOCYTOSIS BLD QL SMEAR: SLIGHT
APAP SERPL-MCNC: <3 UG/ML (ref 10–20)
ASCENDING AORTA: 2.8 CM
AST SERPL-CCNC: 10 U/L (ref 10–40)
AST SERPL-CCNC: 10 U/L (ref 10–40)
AST SERPL-CCNC: 11 U/L (ref 10–40)
AST SERPL-CCNC: 12 U/L (ref 10–40)
AST SERPL-CCNC: 13 U/L (ref 10–40)
AST SERPL-CCNC: 13 U/L (ref 10–40)
AST SERPL-CCNC: 14 U/L (ref 10–40)
AST SERPL-CCNC: 14 U/L (ref 10–40)
AST SERPL-CCNC: 15 U/L (ref 10–40)
AST SERPL-CCNC: 16 U/L (ref 10–40)
AST SERPL-CCNC: 16 U/L (ref 10–40)
AST SERPL-CCNC: 18 U/L (ref 10–40)
AST SERPL-CCNC: 18 U/L (ref 10–40)
AST SERPL-CCNC: 19 U/L (ref 10–40)
AST SERPL-CCNC: 20 U/L (ref 10–40)
AST SERPL-CCNC: 9 U/L (ref 10–40)
AV INDEX (PROSTH): 0.64
AV MEAN GRADIENT: 4 MMHG
AV PEAK GRADIENT: 8 MMHG
AV VALVE AREA BY VELOCITY RATIO: 2.14 CM²
AV VALVE AREA: 1.95 CM²
AV VELOCITY RATIO: 0.7
BACTERIA #/AREA URNS AUTO: ABNORMAL /HPF
BACTERIA #/AREA URNS HPF: NORMAL /HPF
BACTERIA BLD CULT: ABNORMAL
BACTERIA BLD CULT: NORMAL
BACTERIA UR CULT: NO GROWTH
BACTERIA UR CULT: NORMAL
BACTEROIDES FRAGILIS: NOT DETECTED
BARBITURATES UR QL SCN>200 NG/ML: NEGATIVE
BASOPHILS # BLD AUTO: 0.01 K/UL (ref 0–0.2)
BASOPHILS # BLD AUTO: 0.02 K/UL (ref 0–0.2)
BASOPHILS # BLD AUTO: 0.03 K/UL (ref 0–0.2)
BASOPHILS NFR BLD: 0.1 % (ref 0–1.9)
BASOPHILS NFR BLD: 0.2 % (ref 0–1.9)
BASOPHILS NFR BLD: 0.3 % (ref 0–1.9)
BASOPHILS NFR BLD: 0.4 % (ref 0–1.9)
BASOPHILS NFR BLD: 0.4 % (ref 0–1.9)
BASOPHILS NFR BLD: 0.5 % (ref 0–1.9)
BENZODIAZ UR QL SCN>200 NG/ML: NEGATIVE
BILIRUB SERPL-MCNC: 0.8 MG/DL (ref 0.1–1)
BILIRUB SERPL-MCNC: 0.8 MG/DL (ref 0.1–1)
BILIRUB SERPL-MCNC: 0.9 MG/DL (ref 0.1–1)
BILIRUB SERPL-MCNC: 1 MG/DL (ref 0.1–1)
BILIRUB SERPL-MCNC: 1.2 MG/DL (ref 0.1–1)
BILIRUB SERPL-MCNC: 1.2 MG/DL (ref 0.1–1)
BILIRUB SERPL-MCNC: 1.3 MG/DL (ref 0.1–1)
BILIRUB SERPL-MCNC: 1.3 MG/DL (ref 0.1–1)
BILIRUB SERPL-MCNC: 1.4 MG/DL (ref 0.1–1)
BILIRUB SERPL-MCNC: 1.5 MG/DL (ref 0.1–1)
BILIRUB SERPL-MCNC: 1.8 MG/DL (ref 0.1–1)
BILIRUB SERPL-MCNC: 1.9 MG/DL (ref 0.1–1)
BILIRUB SERPL-MCNC: 1.9 MG/DL (ref 0.1–1)
BILIRUB SERPL-MCNC: 2.2 MG/DL (ref 0.1–1)
BILIRUB UR QL STRIP: NEGATIVE
BORDETELLA PARAPERTUSSIS (IS1001): NOT DETECTED
BORDETELLA PERTUSSIS (PTXP): NOT DETECTED
BSA FOR ECHO PROCEDURE: 1.8 M2
BUN SERPL-MCNC: 11 MG/DL (ref 8–23)
BUN SERPL-MCNC: 12 MG/DL (ref 8–23)
BUN SERPL-MCNC: 13 MG/DL (ref 8–23)
BUN SERPL-MCNC: 14 MG/DL (ref 8–23)
BUN SERPL-MCNC: 16 MG/DL (ref 8–23)
BUN SERPL-MCNC: 17 MG/DL (ref 8–23)
BUN SERPL-MCNC: 18 MG/DL (ref 8–23)
BUN SERPL-MCNC: 19 MG/DL (ref 8–23)
BUN SERPL-MCNC: 21 MG/DL (ref 8–23)
BUN SERPL-MCNC: 23 MG/DL (ref 8–23)
BUN SERPL-MCNC: 24 MG/DL (ref 8–23)
BUN SERPL-MCNC: 27 MG/DL (ref 8–23)
BUN SERPL-MCNC: 27 MG/DL (ref 8–23)
BUN SERPL-MCNC: 28 MG/DL (ref 8–23)
BUN SERPL-MCNC: 28 MG/DL (ref 8–23)
BUN SERPL-MCNC: 29 MG/DL (ref 8–23)
BUN SERPL-MCNC: 32 MG/DL (ref 6–30)
BUN SERPL-MCNC: 32 MG/DL (ref 8–23)
BUN SERPL-MCNC: 33 MG/DL (ref 8–23)
BUN SERPL-MCNC: 42 MG/DL (ref 8–23)
BUN SERPL-MCNC: 9 MG/DL (ref 8–23)
BURR CELLS BLD QL SMEAR: ABNORMAL
BZE UR QL SCN: NEGATIVE
CALCIUM SERPL-MCNC: 7.9 MG/DL (ref 8.7–10.5)
CALCIUM SERPL-MCNC: 8 MG/DL (ref 8.7–10.5)
CALCIUM SERPL-MCNC: 8.1 MG/DL (ref 8.7–10.5)
CALCIUM SERPL-MCNC: 8.2 MG/DL (ref 8.7–10.5)
CALCIUM SERPL-MCNC: 8.3 MG/DL (ref 8.7–10.5)
CALCIUM SERPL-MCNC: 8.4 MG/DL (ref 8.7–10.5)
CALCIUM SERPL-MCNC: 8.5 MG/DL (ref 8.7–10.5)
CALCIUM SERPL-MCNC: 8.6 MG/DL (ref 8.7–10.5)
CALCIUM SERPL-MCNC: 8.6 MG/DL (ref 8.7–10.5)
CALCIUM SERPL-MCNC: 8.7 MG/DL (ref 8.7–10.5)
CALCIUM SERPL-MCNC: 8.7 MG/DL (ref 8.7–10.5)
CALCIUM SERPL-MCNC: 8.8 MG/DL (ref 8.7–10.5)
CALCIUM SERPL-MCNC: 8.8 MG/DL (ref 8.7–10.5)
CALCIUM SERPL-MCNC: 8.9 MG/DL (ref 8.7–10.5)
CALCIUM SERPL-MCNC: 8.9 MG/DL (ref 8.7–10.5)
CALCIUM SERPL-MCNC: 9.1 MG/DL (ref 8.7–10.5)
CANDIDA ALBICANS: NOT DETECTED
CANDIDA AURIS: NOT DETECTED
CANDIDA GLABRATA: NOT DETECTED
CANDIDA KRUSEI: NOT DETECTED
CANDIDA PARAPSILOSIS: NOT DETECTED
CANDIDA TROPICALIS: NOT DETECTED
CANNABINOIDS UR QL SCN: NEGATIVE
CHLAMYDIA PNEUMONIAE: NOT DETECTED
CHLORIDE SERPL-SCNC: 101 MMOL/L (ref 95–110)
CHLORIDE SERPL-SCNC: 102 MMOL/L (ref 95–110)
CHLORIDE SERPL-SCNC: 102 MMOL/L (ref 95–110)
CHLORIDE SERPL-SCNC: 103 MMOL/L (ref 95–110)
CHLORIDE SERPL-SCNC: 103 MMOL/L (ref 95–110)
CHLORIDE SERPL-SCNC: 104 MMOL/L (ref 95–110)
CHLORIDE SERPL-SCNC: 105 MMOL/L (ref 95–110)
CHLORIDE SERPL-SCNC: 106 MMOL/L (ref 95–110)
CHLORIDE SERPL-SCNC: 106 MMOL/L (ref 95–110)
CHLORIDE SERPL-SCNC: 108 MMOL/L (ref 95–110)
CHLORIDE SERPL-SCNC: 109 MMOL/L (ref 95–110)
CHLORIDE SERPL-SCNC: 111 MMOL/L (ref 95–110)
CHLORIDE SERPL-SCNC: 111 MMOL/L (ref 95–110)
CHLORIDE SERPL-SCNC: 97 MMOL/L (ref 95–110)
CHLORIDE SERPL-SCNC: 98 MMOL/L (ref 95–110)
CHLORIDE SERPL-SCNC: 99 MMOL/L (ref 95–110)
CHLORIDE UR-SCNC: 45 MMOL/L (ref 25–200)
CLARITY UR REFRACT.AUTO: ABNORMAL
CLARITY UR REFRACT.AUTO: ABNORMAL
CLARITY UR REFRACT.AUTO: CLEAR
CLARITY UR REFRACT.AUTO: CLEAR
CLARITY UR: CLEAR
CO2 SERPL-SCNC: 15 MMOL/L (ref 23–29)
CO2 SERPL-SCNC: 17 MMOL/L (ref 23–29)
CO2 SERPL-SCNC: 18 MMOL/L (ref 23–29)
CO2 SERPL-SCNC: 19 MMOL/L (ref 23–29)
CO2 SERPL-SCNC: 19 MMOL/L (ref 23–29)
CO2 SERPL-SCNC: 20 MMOL/L (ref 23–29)
CO2 SERPL-SCNC: 21 MMOL/L (ref 23–29)
CO2 SERPL-SCNC: 24 MMOL/L (ref 23–29)
COLOR UR AUTO: YELLOW
COLOR UR: YELLOW
CORONAVIRUS 229E, COMMON COLD VIRUS: NOT DETECTED
CORONAVIRUS HKU1, COMMON COLD VIRUS: NOT DETECTED
CORONAVIRUS NL63, COMMON COLD VIRUS: NOT DETECTED
CORONAVIRUS OC43, COMMON COLD VIRUS: NOT DETECTED
CREAT SERPL-MCNC: 0.8 MG/DL (ref 0.5–1.4)
CREAT SERPL-MCNC: 1 MG/DL (ref 0.5–1.4)
CREAT SERPL-MCNC: 1.1 MG/DL (ref 0.5–1.4)
CREAT SERPL-MCNC: 1.2 MG/DL (ref 0.5–1.4)
CREAT SERPL-MCNC: 1.4 MG/DL (ref 0.5–1.4)
CREAT SERPL-MCNC: 1.5 MG/DL (ref 0.5–1.4)
CREAT SERPL-MCNC: 1.6 MG/DL (ref 0.5–1.4)
CREAT SERPL-MCNC: 1.6 MG/DL (ref 0.5–1.4)
CREAT SERPL-MCNC: 1.8 MG/DL (ref 0.5–1.4)
CREAT SERPL-MCNC: 2.1 MG/DL (ref 0.5–1.4)
CREAT SERPL-MCNC: 2.2 MG/DL (ref 0.5–1.4)
CREAT SERPL-MCNC: 2.3 MG/DL (ref 0.5–1.4)
CREAT SERPL-MCNC: 2.3 MG/DL (ref 0.5–1.4)
CREAT SERPL-MCNC: 2.4 MG/DL (ref 0.5–1.4)
CREAT SERPL-MCNC: 3.4 MG/DL (ref 0.5–1.4)
CREAT UR-MCNC: 44 MG/DL (ref 15–325)
CREAT UR-MCNC: 75 MG/DL (ref 15–325)
CREAT UR-MCNC: 80 MG/DL (ref 15–325)
CRYPTOCOCCUS NEOFORMANS/GATTII: NOT DETECTED
CTX-M GENE: ABNORMAL
CV ECHO LV RWT: 0.54 CM
DACRYOCYTES BLD QL SMEAR: ABNORMAL
DELSYS: ABNORMAL
DELSYS: ABNORMAL
DIFFERENTIAL METHOD: ABNORMAL
DOP CALC AO PEAK VEL: 1.45 M/S
DOP CALC AO VTI: 29.98 CM
DOP CALC LVOT AREA: 3 CM2
DOP CALC LVOT DIAMETER: 1.97 CM
DOP CALC LVOT PEAK VEL: 1.02 M/S
DOP CALC LVOT STROKE VOLUME: 58.58 CM3
DOP CALCLVOT PEAK VEL VTI: 19.23 CM
E WAVE DECELERATION TIME: 181.7 MSEC
E/A RATIO: 0.9
E/E' RATIO: 14.43 M/S
ECHO LV POSTERIOR WALL: 1.03 CM (ref 0.6–1.1)
EJECTION FRACTION: 65 %
ENTEROBACTER CLOACAE COMPLEX: NOT DETECTED
ENTEROBACTERALES: NOT DETECTED
ENTEROCOCCUS FAECALIS: NOT DETECTED
ENTEROCOCCUS FAECIUM: NOT DETECTED
EOSINOPHIL # BLD AUTO: 0 K/UL (ref 0–0.5)
EOSINOPHIL # BLD AUTO: 0.1 K/UL (ref 0–0.5)
EOSINOPHIL # BLD AUTO: 0.2 K/UL (ref 0–0.5)
EOSINOPHIL # BLD AUTO: 0.4 K/UL (ref 0–0.5)
EOSINOPHIL NFR BLD: 0 % (ref 0–8)
EOSINOPHIL NFR BLD: 0 % (ref 0–8)
EOSINOPHIL NFR BLD: 0.4 % (ref 0–8)
EOSINOPHIL NFR BLD: 1.1 % (ref 0–8)
EOSINOPHIL NFR BLD: 1.3 % (ref 0–8)
EOSINOPHIL NFR BLD: 1.5 % (ref 0–8)
EOSINOPHIL NFR BLD: 1.6 % (ref 0–8)
EOSINOPHIL NFR BLD: 1.7 % (ref 0–8)
EOSINOPHIL NFR BLD: 1.8 % (ref 0–8)
EOSINOPHIL NFR BLD: 2 % (ref 0–8)
EOSINOPHIL NFR BLD: 2 % (ref 0–8)
EOSINOPHIL NFR BLD: 2.2 % (ref 0–8)
EOSINOPHIL NFR BLD: 2.7 % (ref 0–8)
EOSINOPHIL NFR BLD: 3.3 % (ref 0–8)
EOSINOPHIL NFR BLD: 5.6 % (ref 0–8)
ERYTHROCYTE [DISTWIDTH] IN BLOOD BY AUTOMATED COUNT: 15.8 % (ref 11.5–14.5)
ERYTHROCYTE [DISTWIDTH] IN BLOOD BY AUTOMATED COUNT: 15.8 % (ref 11.5–14.5)
ERYTHROCYTE [DISTWIDTH] IN BLOOD BY AUTOMATED COUNT: 15.9 % (ref 11.5–14.5)
ERYTHROCYTE [DISTWIDTH] IN BLOOD BY AUTOMATED COUNT: 15.9 % (ref 11.5–14.5)
ERYTHROCYTE [DISTWIDTH] IN BLOOD BY AUTOMATED COUNT: 16.1 % (ref 11.5–14.5)
ERYTHROCYTE [DISTWIDTH] IN BLOOD BY AUTOMATED COUNT: 16.3 % (ref 11.5–14.5)
ERYTHROCYTE [DISTWIDTH] IN BLOOD BY AUTOMATED COUNT: 16.4 % (ref 11.5–14.5)
ERYTHROCYTE [DISTWIDTH] IN BLOOD BY AUTOMATED COUNT: 16.4 % (ref 11.5–14.5)
ERYTHROCYTE [DISTWIDTH] IN BLOOD BY AUTOMATED COUNT: 16.5 % (ref 11.5–14.5)
ERYTHROCYTE [DISTWIDTH] IN BLOOD BY AUTOMATED COUNT: 17.3 % (ref 11.5–14.5)
ERYTHROCYTE [DISTWIDTH] IN BLOOD BY AUTOMATED COUNT: 17.3 % (ref 11.5–14.5)
ERYTHROCYTE [DISTWIDTH] IN BLOOD BY AUTOMATED COUNT: 17.4 % (ref 11.5–14.5)
ERYTHROCYTE [DISTWIDTH] IN BLOOD BY AUTOMATED COUNT: 17.5 % (ref 11.5–14.5)
ERYTHROCYTE [DISTWIDTH] IN BLOOD BY AUTOMATED COUNT: 17.7 % (ref 11.5–14.5)
ERYTHROCYTE [DISTWIDTH] IN BLOOD BY AUTOMATED COUNT: 18 % (ref 11.5–14.5)
ESCHERICHIA COLI: NOT DETECTED
EST. GFR  (NO RACE VARIABLE): 12.7 ML/MIN/1.73 M^2
EST. GFR  (NO RACE VARIABLE): 19.2 ML/MIN/1.73 M^2
EST. GFR  (NO RACE VARIABLE): 20.3 ML/MIN/1.73 M^2
EST. GFR  (NO RACE VARIABLE): 20.3 ML/MIN/1.73 M^2
EST. GFR  (NO RACE VARIABLE): 21.4 ML/MIN/1.73 M^2
EST. GFR  (NO RACE VARIABLE): 27.3 ML/MIN/1.73 M^2
EST. GFR  (NO RACE VARIABLE): 31.2 ML/MIN/1.73 M^2
EST. GFR  (NO RACE VARIABLE): 31.4 ML/MIN/1.73 M^2
EST. GFR  (NO RACE VARIABLE): 34 ML/MIN/1.73 M^2
EST. GFR  (NO RACE VARIABLE): 36.6 ML/MIN/1.73 M^2
EST. GFR  (NO RACE VARIABLE): 36.9 ML/MIN/1.73 M^2
EST. GFR  (NO RACE VARIABLE): 44.4 ML/MIN/1.73 M^2
EST. GFR  (NO RACE VARIABLE): 49 ML/MIN/1.73 M^2
EST. GFR  (NO RACE VARIABLE): 49.2 ML/MIN/1.73 M^2
EST. GFR  (NO RACE VARIABLE): 49.2 ML/MIN/1.73 M^2
EST. GFR  (NO RACE VARIABLE): 54.9 ML/MIN/1.73 M^2
EST. GFR  (NO RACE VARIABLE): 54.9 ML/MIN/1.73 M^2
EST. GFR  (NO RACE VARIABLE): 55 ML/MIN/1.73 M^2
EST. GFR  (NO RACE VARIABLE): >60 ML/MIN/1.73 M^2
ETHANOL UR-MCNC: <10 MG/DL
FLUBV RNA NPH QL NAA+NON-PROBE: NOT DETECTED
FOLATE SERPL-MCNC: 5.2 NG/ML (ref 4–24)
FRACTIONAL SHORTENING: 41 % (ref 28–44)
FUNGITELL COMMENTS: NEGATIVE
GLUCOSE SERPL-MCNC: 100 MG/DL (ref 70–110)
GLUCOSE SERPL-MCNC: 109 MG/DL (ref 70–110)
GLUCOSE SERPL-MCNC: 126 MG/DL (ref 70–110)
GLUCOSE SERPL-MCNC: 141 MG/DL (ref 70–110)
GLUCOSE SERPL-MCNC: 155 MG/DL (ref 70–110)
GLUCOSE SERPL-MCNC: 44 MG/DL (ref 70–110)
GLUCOSE SERPL-MCNC: 57 MG/DL (ref 70–110)
GLUCOSE SERPL-MCNC: 64 MG/DL (ref 70–110)
GLUCOSE SERPL-MCNC: 69 MG/DL (ref 70–110)
GLUCOSE SERPL-MCNC: 69 MG/DL (ref 70–110)
GLUCOSE SERPL-MCNC: 71 MG/DL (ref 70–110)
GLUCOSE SERPL-MCNC: 72 MG/DL (ref 70–110)
GLUCOSE SERPL-MCNC: 74 MG/DL (ref 70–110)
GLUCOSE SERPL-MCNC: 74 MG/DL (ref 70–110)
GLUCOSE SERPL-MCNC: 75 MG/DL (ref 70–110)
GLUCOSE SERPL-MCNC: 76 MG/DL (ref 70–110)
GLUCOSE SERPL-MCNC: 79 MG/DL (ref 70–110)
GLUCOSE SERPL-MCNC: 80 MG/DL (ref 70–110)
GLUCOSE SERPL-MCNC: 85 MG/DL (ref 70–110)
GLUCOSE SERPL-MCNC: 88 MG/DL (ref 70–110)
GLUCOSE SERPL-MCNC: 97 MG/DL (ref 70–110)
GLUCOSE UR QL STRIP: NEGATIVE
HAEMOPHILUS INFLUENZAE: NOT DETECTED
HCO3 UR-SCNC: 23.6 MMOL/L (ref 24–28)
HCO3 UR-SCNC: 26.7 MMOL/L (ref 24–28)
HCO3 UR-SCNC: 27.1 MMOL/L (ref 24–28)
HCT VFR BLD AUTO: 29.2 % (ref 37–48.5)
HCT VFR BLD AUTO: 29.4 % (ref 37–48.5)
HCT VFR BLD AUTO: 30.6 % (ref 37–48.5)
HCT VFR BLD AUTO: 31.2 % (ref 37–48.5)
HCT VFR BLD AUTO: 32.1 % (ref 37–48.5)
HCT VFR BLD AUTO: 32.3 % (ref 37–48.5)
HCT VFR BLD AUTO: 32.6 % (ref 37–48.5)
HCT VFR BLD AUTO: 33 % (ref 37–48.5)
HCT VFR BLD AUTO: 33.1 % (ref 37–48.5)
HCT VFR BLD AUTO: 34.6 % (ref 37–48.5)
HCT VFR BLD AUTO: 34.7 % (ref 37–48.5)
HCT VFR BLD AUTO: 35.5 % (ref 37–48.5)
HCT VFR BLD AUTO: 36.7 % (ref 37–48.5)
HCT VFR BLD AUTO: 38.1 % (ref 37–48.5)
HCT VFR BLD AUTO: 39.1 % (ref 37–48.5)
HCT VFR BLD CALC: 26 %PCV (ref 36–54)
HCT VFR BLD CALC: 32 %PCV (ref 36–54)
HCT VFR BLD CALC: 34 %PCV (ref 36–54)
HCYS SERPL-SCNC: 27.2 UMOL/L (ref 4–15.5)
HGB BLD-MCNC: 10 G/DL (ref 12–16)
HGB BLD-MCNC: 10.1 G/DL (ref 12–16)
HGB BLD-MCNC: 10.2 G/DL (ref 12–16)
HGB BLD-MCNC: 10.5 G/DL (ref 12–16)
HGB BLD-MCNC: 10.9 G/DL (ref 12–16)
HGB BLD-MCNC: 11 G/DL (ref 12–16)
HGB BLD-MCNC: 11 G/DL (ref 12–16)
HGB BLD-MCNC: 11.2 G/DL (ref 12–16)
HGB BLD-MCNC: 11.9 G/DL (ref 12–16)
HGB BLD-MCNC: 8.9 G/DL (ref 12–16)
HGB BLD-MCNC: 9.1 G/DL (ref 12–16)
HGB BLD-MCNC: 9.2 G/DL (ref 12–16)
HGB BLD-MCNC: 9.6 G/DL (ref 12–16)
HGB BLD-MCNC: 9.7 G/DL (ref 12–16)
HGB BLD-MCNC: 9.7 G/DL (ref 12–16)
HGB UR QL STRIP: ABNORMAL
HGB UR QL STRIP: NEGATIVE
HPIV1 RNA NPH QL NAA+NON-PROBE: NOT DETECTED
HPIV2 RNA NPH QL NAA+NON-PROBE: NOT DETECTED
HPIV3 RNA NPH QL NAA+NON-PROBE: NOT DETECTED
HPIV4 RNA NPH QL NAA+NON-PROBE: NOT DETECTED
HUMAN METAPNEUMOVIRUS: NOT DETECTED
HYALINE CASTS UR QL AUTO: 0 /LPF
HYALINE CASTS UR QL AUTO: 1 /LPF
HYALINE CASTS UR QL AUTO: 10 /LPF
HYPOCHROMIA BLD QL SMEAR: ABNORMAL
HYPOCHROMIA BLD QL SMEAR: ABNORMAL
IMM GRANULOCYTES # BLD AUTO: 0.03 K/UL (ref 0–0.04)
IMM GRANULOCYTES # BLD AUTO: 0.04 K/UL (ref 0–0.04)
IMM GRANULOCYTES # BLD AUTO: 0.05 K/UL (ref 0–0.04)
IMM GRANULOCYTES # BLD AUTO: 0.06 K/UL (ref 0–0.04)
IMM GRANULOCYTES # BLD AUTO: 0.06 K/UL (ref 0–0.04)
IMM GRANULOCYTES # BLD AUTO: 0.07 K/UL (ref 0–0.04)
IMM GRANULOCYTES # BLD AUTO: 0.08 K/UL (ref 0–0.04)
IMM GRANULOCYTES # BLD AUTO: 0.08 K/UL (ref 0–0.04)
IMM GRANULOCYTES # BLD AUTO: 0.1 K/UL (ref 0–0.04)
IMM GRANULOCYTES # BLD AUTO: 0.11 K/UL (ref 0–0.04)
IMM GRANULOCYTES # BLD AUTO: 0.11 K/UL (ref 0–0.04)
IMM GRANULOCYTES # BLD AUTO: 0.16 K/UL (ref 0–0.04)
IMM GRANULOCYTES # BLD AUTO: 0.21 K/UL (ref 0–0.04)
IMM GRANULOCYTES NFR BLD AUTO: 0.3 % (ref 0–0.5)
IMM GRANULOCYTES NFR BLD AUTO: 0.6 % (ref 0–0.5)
IMM GRANULOCYTES NFR BLD AUTO: 0.7 % (ref 0–0.5)
IMM GRANULOCYTES NFR BLD AUTO: 0.7 % (ref 0–0.5)
IMM GRANULOCYTES NFR BLD AUTO: 0.9 % (ref 0–0.5)
IMM GRANULOCYTES NFR BLD AUTO: 0.9 % (ref 0–0.5)
IMM GRANULOCYTES NFR BLD AUTO: 1 % (ref 0–0.5)
IMM GRANULOCYTES NFR BLD AUTO: 1.1 % (ref 0–0.5)
IMM GRANULOCYTES NFR BLD AUTO: 1.2 % (ref 0–0.5)
IMM GRANULOCYTES NFR BLD AUTO: 1.3 % (ref 0–0.5)
IMM GRANULOCYTES NFR BLD AUTO: 1.8 % (ref 0–0.5)
IMM GRANULOCYTES NFR BLD AUTO: 2 % (ref 0–0.5)
IMP GENE: ABNORMAL
INFLUENZA A (SUBTYPES H1,H1-2009,H3): NOT DETECTED
INFLUENZA A, MOLECULAR: NEGATIVE
INFLUENZA A, MOLECULAR: NOT DETECTED
INFLUENZA B, MOLECULAR: NEGATIVE
INFLUENZA B, MOLECULAR: NOT DETECTED
INTERVENTRICULAR SEPTUM: 1.01 CM (ref 0.6–1.1)
IVRT: 106.57 MSEC
KETONES UR QL STRIP: NEGATIVE
KLEBSIELLA AEROGENES: NOT DETECTED
KLEBSIELLA OXYTOCA: NOT DETECTED
KLEBSIELLA PNEUMONIAE GROUP: NOT DETECTED
KPC: ABNORMAL
LA MAJOR: 6.07 CM
LA MINOR: 5.82 CM
LA WIDTH: 4.5 CM
LACTATE SERPL-SCNC: 1.2 MMOL/L (ref 0.5–2.2)
LACTATE SERPL-SCNC: 1.7 MMOL/L (ref 0.5–2.2)
LDH SERPL L TO P-CCNC: 0.68 MMOL/L (ref 0.5–2.2)
LDH SERPL L TO P-CCNC: 0.84 MMOL/L (ref 0.5–2.2)
LEFT ATRIUM SIZE: 4.92 CM
LEFT ATRIUM VOLUME INDEX MOD: 31 ML/M2
LEFT ATRIUM VOLUME INDEX: 63.2 ML/M2
LEFT ATRIUM VOLUME MOD: 54.83 CM3
LEFT ATRIUM VOLUME: 111.83 CM3
LEFT INTERNAL DIMENSION IN SYSTOLE: 2.24 CM (ref 2.1–4)
LEFT VENTRICLE DIASTOLIC VOLUME INDEX: 34.61 ML/M2
LEFT VENTRICLE DIASTOLIC VOLUME: 61.26 ML
LEFT VENTRICLE MASS INDEX: 68 G/M2
LEFT VENTRICLE SYSTOLIC VOLUME INDEX: 9.6 ML/M2
LEFT VENTRICLE SYSTOLIC VOLUME: 16.97 ML
LEFT VENTRICULAR INTERNAL DIMENSION IN DIASTOLE: 3.78 CM (ref 3.5–6)
LEFT VENTRICULAR MASS: 119.68 G
LEUKOCYTE ESTERASE UR QL STRIP: ABNORMAL
LEUKOCYTE ESTERASE UR QL STRIP: NEGATIVE
LISTERIA MONOCYTOGENES: NOT DETECTED
LV LATERAL E/E' RATIO: 11.22 M/S
LV SEPTAL E/E' RATIO: 20.2 M/S
LYMPHOCYTES # BLD AUTO: 0.6 K/UL (ref 1–4.8)
LYMPHOCYTES # BLD AUTO: 0.7 K/UL (ref 1–4.8)
LYMPHOCYTES # BLD AUTO: 1.4 K/UL (ref 1–4.8)
LYMPHOCYTES # BLD AUTO: 1.4 K/UL (ref 1–4.8)
LYMPHOCYTES # BLD AUTO: 1.5 K/UL (ref 1–4.8)
LYMPHOCYTES # BLD AUTO: 1.6 K/UL (ref 1–4.8)
LYMPHOCYTES # BLD AUTO: 1.7 K/UL (ref 1–4.8)
LYMPHOCYTES # BLD AUTO: 1.8 K/UL (ref 1–4.8)
LYMPHOCYTES # BLD AUTO: 1.8 K/UL (ref 1–4.8)
LYMPHOCYTES # BLD AUTO: 1.9 K/UL (ref 1–4.8)
LYMPHOCYTES # BLD AUTO: 1.9 K/UL (ref 1–4.8)
LYMPHOCYTES # BLD AUTO: 2.1 K/UL (ref 1–4.8)
LYMPHOCYTES # BLD AUTO: 2.1 K/UL (ref 1–4.8)
LYMPHOCYTES # BLD AUTO: 2.3 K/UL (ref 1–4.8)
LYMPHOCYTES # BLD AUTO: 2.4 K/UL (ref 1–4.8)
LYMPHOCYTES # BLD AUTO: 2.8 K/UL (ref 1–4.8)
LYMPHOCYTES # BLD AUTO: 4 K/UL (ref 1–4.8)
LYMPHOCYTES NFR BLD: 15.5 % (ref 18–48)
LYMPHOCYTES NFR BLD: 18 % (ref 18–48)
LYMPHOCYTES NFR BLD: 19.4 % (ref 18–48)
LYMPHOCYTES NFR BLD: 19.5 % (ref 18–48)
LYMPHOCYTES NFR BLD: 20.3 % (ref 18–48)
LYMPHOCYTES NFR BLD: 20.9 % (ref 18–48)
LYMPHOCYTES NFR BLD: 22.7 % (ref 18–48)
LYMPHOCYTES NFR BLD: 22.8 % (ref 18–48)
LYMPHOCYTES NFR BLD: 26.1 % (ref 18–48)
LYMPHOCYTES NFR BLD: 28.5 % (ref 18–48)
LYMPHOCYTES NFR BLD: 28.7 % (ref 18–48)
LYMPHOCYTES NFR BLD: 29.1 % (ref 18–48)
LYMPHOCYTES NFR BLD: 31.4 % (ref 18–48)
LYMPHOCYTES NFR BLD: 32.5 % (ref 18–48)
LYMPHOCYTES NFR BLD: 34.9 % (ref 18–48)
LYMPHOCYTES NFR BLD: 36.8 % (ref 18–48)
LYMPHOCYTES NFR BLD: 4.9 % (ref 18–48)
MAGNESIUM SERPL-MCNC: 1.4 MG/DL (ref 1.6–2.6)
MAGNESIUM SERPL-MCNC: 1.6 MG/DL (ref 1.6–2.6)
MAGNESIUM SERPL-MCNC: 1.6 MG/DL (ref 1.6–2.6)
MAGNESIUM SERPL-MCNC: 1.7 MG/DL (ref 1.6–2.6)
MAGNESIUM SERPL-MCNC: 1.8 MG/DL (ref 1.6–2.6)
MAGNESIUM SERPL-MCNC: 1.9 MG/DL (ref 1.6–2.6)
MAGNESIUM SERPL-MCNC: 2 MG/DL (ref 1.6–2.6)
MAGNESIUM SERPL-MCNC: 2.1 MG/DL (ref 1.6–2.6)
MAGNESIUM SERPL-MCNC: 2.1 MG/DL (ref 1.6–2.6)
MCH RBC QN AUTO: 26.4 PG (ref 27–31)
MCH RBC QN AUTO: 26.6 PG (ref 27–31)
MCH RBC QN AUTO: 26.6 PG (ref 27–31)
MCH RBC QN AUTO: 26.7 PG (ref 27–31)
MCH RBC QN AUTO: 26.9 PG (ref 27–31)
MCH RBC QN AUTO: 27 PG (ref 27–31)
MCH RBC QN AUTO: 27.1 PG (ref 27–31)
MCH RBC QN AUTO: 27.2 PG (ref 27–31)
MCH RBC QN AUTO: 27.3 PG (ref 27–31)
MCH RBC QN AUTO: 27.4 PG (ref 27–31)
MCH RBC QN AUTO: 27.4 PG (ref 27–31)
MCH RBC QN AUTO: 27.6 PG (ref 27–31)
MCH RBC QN AUTO: 27.6 PG (ref 27–31)
MCH RBC QN AUTO: 27.9 PG (ref 27–31)
MCHC RBC AUTO-ENTMCNC: 29.4 G/DL (ref 32–36)
MCHC RBC AUTO-ENTMCNC: 30 G/DL (ref 32–36)
MCHC RBC AUTO-ENTMCNC: 30 G/DL (ref 32–36)
MCHC RBC AUTO-ENTMCNC: 30.1 G/DL (ref 32–36)
MCHC RBC AUTO-ENTMCNC: 30.2 G/DL (ref 32–36)
MCHC RBC AUTO-ENTMCNC: 30.3 G/DL (ref 32–36)
MCHC RBC AUTO-ENTMCNC: 30.4 G/DL (ref 32–36)
MCHC RBC AUTO-ENTMCNC: 30.5 G/DL (ref 32–36)
MCHC RBC AUTO-ENTMCNC: 30.8 G/DL (ref 32–36)
MCHC RBC AUTO-ENTMCNC: 31 G/DL (ref 32–36)
MCHC RBC AUTO-ENTMCNC: 31.2 G/DL (ref 32–36)
MCHC RBC AUTO-ENTMCNC: 31.3 G/DL (ref 32–36)
MCHC RBC AUTO-ENTMCNC: 31.5 G/DL (ref 32–36)
MCR-1: ABNORMAL
MCV RBC AUTO: 86 FL (ref 82–98)
MCV RBC AUTO: 87 FL (ref 82–98)
MCV RBC AUTO: 88 FL (ref 82–98)
MCV RBC AUTO: 89 FL (ref 82–98)
MCV RBC AUTO: 90 FL (ref 82–98)
MCV RBC AUTO: 91 FL (ref 82–98)
MCV RBC AUTO: 91 FL (ref 82–98)
MEC A/C AND MREJ (MRSA): ABNORMAL
MEC A/C: NOT DETECTED
METHADONE UR QL SCN>300 NG/ML: NEGATIVE
METHYLMALONATE SERPL-SCNC: 0.15 UMOL/L
MICROSCOPIC COMMENT: ABNORMAL
MICROSCOPIC COMMENT: NORMAL
MODE: ABNORMAL
MONOCYTES # BLD AUTO: 0.3 K/UL (ref 0.3–1)
MONOCYTES # BLD AUTO: 1.4 K/UL (ref 0.3–1)
MONOCYTES # BLD AUTO: 1.6 K/UL (ref 0.3–1)
MONOCYTES # BLD AUTO: 1.8 K/UL (ref 0.3–1)
MONOCYTES # BLD AUTO: 1.9 K/UL (ref 0.3–1)
MONOCYTES # BLD AUTO: 2 K/UL (ref 0.3–1)
MONOCYTES # BLD AUTO: 2.2 K/UL (ref 0.3–1)
MONOCYTES # BLD AUTO: 2.2 K/UL (ref 0.3–1)
MONOCYTES # BLD AUTO: 2.3 K/UL (ref 0.3–1)
MONOCYTES # BLD AUTO: 2.4 K/UL (ref 0.3–1)
MONOCYTES # BLD AUTO: 2.7 K/UL (ref 0.3–1)
MONOCYTES # BLD AUTO: 2.8 K/UL (ref 0.3–1)
MONOCYTES # BLD AUTO: 2.9 K/UL (ref 0.3–1)
MONOCYTES # BLD AUTO: 3 K/UL (ref 0.3–1)
MONOCYTES # BLD AUTO: 3.1 K/UL (ref 0.3–1)
MONOCYTES # BLD AUTO: 3.6 K/UL (ref 0.3–1)
MONOCYTES # BLD AUTO: 4.1 K/UL (ref 0.3–1)
MONOCYTES NFR BLD: 14.5 % (ref 4–15)
MONOCYTES NFR BLD: 20.7 % (ref 4–15)
MONOCYTES NFR BLD: 23.1 % (ref 4–15)
MONOCYTES NFR BLD: 24 % (ref 4–15)
MONOCYTES NFR BLD: 26.1 % (ref 4–15)
MONOCYTES NFR BLD: 27.1 % (ref 4–15)
MONOCYTES NFR BLD: 27.3 % (ref 4–15)
MONOCYTES NFR BLD: 28.7 % (ref 4–15)
MONOCYTES NFR BLD: 30.1 % (ref 4–15)
MONOCYTES NFR BLD: 32.1 % (ref 4–15)
MONOCYTES NFR BLD: 33.3 % (ref 4–15)
MONOCYTES NFR BLD: 35.7 % (ref 4–15)
MONOCYTES NFR BLD: 38.7 % (ref 4–15)
MONOCYTES NFR BLD: 40.8 % (ref 4–15)
MONOCYTES NFR BLD: 43.3 % (ref 4–15)
MONOCYTES NFR BLD: 44.8 % (ref 4–15)
MONOCYTES NFR BLD: 8.5 % (ref 4–15)
MV PEAK A VEL: 1.12 M/S
MV PEAK E VEL: 1.01 M/S
MV STENOSIS PRESSURE HALF TIME: 52.69 MS
MV VALVE AREA P 1/2 METHOD: 4.18 CM2
MYCOPLASMA PNEUMONIAE: NOT DETECTED
NDM GENE: ABNORMAL
NEISSERIA MENINGITIDIS: NOT DETECTED
NEUTROPHILS # BLD AUTO: 1.7 K/UL (ref 1.8–7.7)
NEUTROPHILS # BLD AUTO: 1.8 K/UL (ref 1.8–7.7)
NEUTROPHILS # BLD AUTO: 10.1 K/UL (ref 1.8–7.7)
NEUTROPHILS # BLD AUTO: 2 K/UL (ref 1.8–7.7)
NEUTROPHILS # BLD AUTO: 2 K/UL (ref 1.8–7.7)
NEUTROPHILS # BLD AUTO: 2.4 K/UL (ref 1.8–7.7)
NEUTROPHILS # BLD AUTO: 2.5 K/UL (ref 1.8–7.7)
NEUTROPHILS # BLD AUTO: 2.6 K/UL (ref 1.8–7.7)
NEUTROPHILS # BLD AUTO: 2.7 K/UL (ref 1.8–7.7)
NEUTROPHILS # BLD AUTO: 3 K/UL (ref 1.8–7.7)
NEUTROPHILS # BLD AUTO: 3.2 K/UL (ref 1.8–7.7)
NEUTROPHILS # BLD AUTO: 3.4 K/UL (ref 1.8–7.7)
NEUTROPHILS # BLD AUTO: 3.7 K/UL (ref 1.8–7.7)
NEUTROPHILS # BLD AUTO: 3.9 K/UL (ref 1.8–7.7)
NEUTROPHILS # BLD AUTO: 4.4 K/UL (ref 1.8–7.7)
NEUTROPHILS # BLD AUTO: 4.8 K/UL (ref 1.8–7.7)
NEUTROPHILS # BLD AUTO: 6.8 K/UL (ref 1.8–7.7)
NEUTROPHILS NFR BLD: 28 % (ref 38–73)
NEUTROPHILS NFR BLD: 29.6 % (ref 38–73)
NEUTROPHILS NFR BLD: 31.1 % (ref 38–73)
NEUTROPHILS NFR BLD: 32.9 % (ref 38–73)
NEUTROPHILS NFR BLD: 33.3 % (ref 38–73)
NEUTROPHILS NFR BLD: 35 % (ref 38–73)
NEUTROPHILS NFR BLD: 36.5 % (ref 38–73)
NEUTROPHILS NFR BLD: 36.9 % (ref 38–73)
NEUTROPHILS NFR BLD: 37.3 % (ref 38–73)
NEUTROPHILS NFR BLD: 38 % (ref 38–73)
NEUTROPHILS NFR BLD: 42.3 % (ref 38–73)
NEUTROPHILS NFR BLD: 44.2 % (ref 38–73)
NEUTROPHILS NFR BLD: 50.9 % (ref 38–73)
NEUTROPHILS NFR BLD: 56.4 % (ref 38–73)
NEUTROPHILS NFR BLD: 57.7 % (ref 38–73)
NEUTROPHILS NFR BLD: 66.5 % (ref 38–73)
NEUTROPHILS NFR BLD: 79.2 % (ref 38–73)
NITRITE UR QL STRIP: NEGATIVE
NON-SQ EPI CELLS #/AREA URNS AUTO: 1 /HPF
NRBC BLD-RTO: 0 /100 WBC
OPIATES UR QL SCN: NEGATIVE
OSMOLALITY SERPL: 285 MOSM/KG (ref 275–295)
OSMOLALITY UR: 308 MOSM/KG (ref 50–1200)
OSMOLALITY UR: 397 MOSM/KG (ref 50–1200)
OVALOCYTES BLD QL SMEAR: ABNORMAL
OXA-48-LIKE: ABNORMAL
PCO2 BLDA: 41.8 MMHG (ref 35–45)
PCO2 BLDA: 46.4 MMHG (ref 35–45)
PCO2 BLDA: 46.8 MMHG (ref 35–45)
PCP UR QL SCN>25 NG/ML: NEGATIVE
PH SMN: 7.36 [PH] (ref 7.35–7.45)
PH SMN: 7.37 [PH] (ref 7.35–7.45)
PH SMN: 7.37 [PH] (ref 7.35–7.45)
PH UR STRIP: 5 [PH] (ref 5–8)
PH UR STRIP: 6 [PH] (ref 5–8)
PH UR STRIP: 7 [PH] (ref 5–8)
PHOSPHATE SERPL-MCNC: 2.7 MG/DL (ref 2.7–4.5)
PHOSPHATE SERPL-MCNC: 2.8 MG/DL (ref 2.7–4.5)
PHOSPHATE SERPL-MCNC: 3 MG/DL (ref 2.7–4.5)
PHOSPHATE SERPL-MCNC: 3 MG/DL (ref 2.7–4.5)
PHOSPHATE SERPL-MCNC: 3.1 MG/DL (ref 2.7–4.5)
PHOSPHATE SERPL-MCNC: 3.1 MG/DL (ref 2.7–4.5)
PHOSPHATE SERPL-MCNC: 3.2 MG/DL (ref 2.7–4.5)
PHOSPHATE SERPL-MCNC: 3.6 MG/DL (ref 2.7–4.5)
PHOSPHATE SERPL-MCNC: 3.6 MG/DL (ref 2.7–4.5)
PHOSPHATE SERPL-MCNC: 3.9 MG/DL (ref 2.7–4.5)
PHOSPHATE SERPL-MCNC: 4.3 MG/DL (ref 2.7–4.5)
PISA TR MAX VEL: 3.12 M/S
PLATELET # BLD AUTO: 100 K/UL (ref 150–450)
PLATELET # BLD AUTO: 102 K/UL (ref 150–450)
PLATELET # BLD AUTO: 107 K/UL (ref 150–450)
PLATELET # BLD AUTO: 108 K/UL (ref 150–450)
PLATELET # BLD AUTO: 122 K/UL (ref 150–450)
PLATELET # BLD AUTO: 127 K/UL (ref 150–450)
PLATELET # BLD AUTO: 152 K/UL (ref 150–450)
PLATELET # BLD AUTO: 154 K/UL (ref 150–450)
PLATELET # BLD AUTO: 168 K/UL (ref 150–450)
PLATELET # BLD AUTO: 173 K/UL (ref 150–450)
PLATELET # BLD AUTO: 182 K/UL (ref 150–450)
PLATELET # BLD AUTO: 207 K/UL (ref 150–450)
PLATELET # BLD AUTO: 83 K/UL (ref 150–450)
PLATELET # BLD AUTO: 86 K/UL (ref 150–450)
PLATELET # BLD AUTO: 86 K/UL (ref 150–450)
PLATELET # BLD AUTO: 95 K/UL (ref 150–450)
PLATELET # BLD AUTO: 96 K/UL (ref 150–450)
PLATELET BLD QL SMEAR: ABNORMAL
PMV BLD AUTO: 10.2 FL (ref 9.2–12.9)
PMV BLD AUTO: 10.3 FL (ref 9.2–12.9)
PMV BLD AUTO: 10.8 FL (ref 9.2–12.9)
PMV BLD AUTO: 11 FL (ref 9.2–12.9)
PMV BLD AUTO: 11.1 FL (ref 9.2–12.9)
PMV BLD AUTO: 11.3 FL (ref 9.2–12.9)
PMV BLD AUTO: 11.4 FL (ref 9.2–12.9)
PMV BLD AUTO: 11.5 FL (ref 9.2–12.9)
PMV BLD AUTO: 11.7 FL (ref 9.2–12.9)
PMV BLD AUTO: 11.7 FL (ref 9.2–12.9)
PO2 BLDA: 22 MMHG (ref 40–60)
PO2 BLDA: 24 MMHG (ref 40–60)
PO2 BLDA: 30 MMHG (ref 40–60)
POC BE: -2 MMOL/L
POC BE: 1 MMOL/L
POC BE: 2 MMOL/L
POC IONIZED CALCIUM: 1.1 MMOL/L (ref 1.06–1.42)
POC IONIZED CALCIUM: 1.18 MMOL/L (ref 1.06–1.42)
POC SATURATED O2: 34 % (ref 95–100)
POC SATURATED O2: 41 % (ref 95–100)
POC SATURATED O2: 54 % (ref 95–100)
POC TCO2 (MEASURED): 23 MMOL/L (ref 23–29)
POC TCO2: 25 MMOL/L (ref 24–29)
POC TCO2: 28 MMOL/L (ref 24–29)
POC TCO2: 29 MMOL/L (ref 24–29)
POCT GLUCOSE: 108 MG/DL (ref 70–110)
POCT GLUCOSE: 114 MG/DL (ref 70–110)
POCT GLUCOSE: 130 MG/DL (ref 70–110)
POCT GLUCOSE: 133 MG/DL (ref 70–110)
POCT GLUCOSE: 54 MG/DL (ref 70–110)
POCT GLUCOSE: 75 MG/DL (ref 70–110)
POCT GLUCOSE: 89 MG/DL (ref 70–110)
POCT GLUCOSE: 95 MG/DL (ref 70–110)
POCT GLUCOSE: 97 MG/DL (ref 70–110)
POIKILOCYTOSIS BLD QL SMEAR: ABNORMAL
POIKILOCYTOSIS BLD QL SMEAR: SLIGHT
POLYCHROMASIA BLD QL SMEAR: ABNORMAL
POTASSIUM BLD-SCNC: 4.8 MMOL/L (ref 3.5–5.1)
POTASSIUM BLD-SCNC: 5 MMOL/L (ref 3.5–5.1)
POTASSIUM SERPL-SCNC: 3.5 MMOL/L (ref 3.5–5.1)
POTASSIUM SERPL-SCNC: 4.1 MMOL/L (ref 3.5–5.1)
POTASSIUM SERPL-SCNC: 4.3 MMOL/L (ref 3.5–5.1)
POTASSIUM SERPL-SCNC: 4.5 MMOL/L (ref 3.5–5.1)
POTASSIUM SERPL-SCNC: 4.6 MMOL/L (ref 3.5–5.1)
POTASSIUM SERPL-SCNC: 4.7 MMOL/L (ref 3.5–5.1)
POTASSIUM SERPL-SCNC: 4.8 MMOL/L (ref 3.5–5.1)
POTASSIUM SERPL-SCNC: 4.9 MMOL/L (ref 3.5–5.1)
POTASSIUM SERPL-SCNC: 4.9 MMOL/L (ref 3.5–5.1)
POTASSIUM SERPL-SCNC: 5 MMOL/L (ref 3.5–5.1)
PROCALCITONIN SERPL IA-MCNC: 0.43 NG/ML
PROT SERPL-MCNC: 5.2 G/DL (ref 6–8.4)
PROT SERPL-MCNC: 5.3 G/DL (ref 6–8.4)
PROT SERPL-MCNC: 5.5 G/DL (ref 6–8.4)
PROT SERPL-MCNC: 5.6 G/DL (ref 6–8.4)
PROT SERPL-MCNC: 5.7 G/DL (ref 6–8.4)
PROT SERPL-MCNC: 5.9 G/DL (ref 6–8.4)
PROT SERPL-MCNC: 5.9 G/DL (ref 6–8.4)
PROT SERPL-MCNC: 6 G/DL (ref 6–8.4)
PROT SERPL-MCNC: 6.2 G/DL (ref 6–8.4)
PROT SERPL-MCNC: 6.3 G/DL (ref 6–8.4)
PROT SERPL-MCNC: 6.5 G/DL (ref 6–8.4)
PROT UR QL STRIP: ABNORMAL
PROT UR QL STRIP: NEGATIVE
PROT UR-MCNC: 10 MG/DL (ref 0–15)
PROT/CREAT UR: 0.13 MG/G{CREAT} (ref 0–0.2)
PROTEUS SPECIES: NOT DETECTED
PSEUDOMONAS AERUGINOSA: NOT DETECTED
PYRIDOXAL SERPL-MCNC: 4 UG/L (ref 5–50)
RA MAJOR: 4.74 CM
RA PRESSURE ESTIMATED: 3 MMHG
RA WIDTH: 3.59 CM
RBC # BLD AUTO: 3.31 M/UL (ref 4–5.4)
RBC # BLD AUTO: 3.33 M/UL (ref 4–5.4)
RBC # BLD AUTO: 3.38 M/UL (ref 4–5.4)
RBC # BLD AUTO: 3.41 M/UL (ref 4–5.4)
RBC # BLD AUTO: 3.41 M/UL (ref 4–5.4)
RBC # BLD AUTO: 3.57 M/UL (ref 4–5.4)
RBC # BLD AUTO: 3.58 M/UL (ref 4–5.4)
RBC # BLD AUTO: 3.64 M/UL (ref 4–5.4)
RBC # BLD AUTO: 3.66 M/UL (ref 4–5.4)
RBC # BLD AUTO: 3.67 M/UL (ref 4–5.4)
RBC # BLD AUTO: 3.72 M/UL (ref 4–5.4)
RBC # BLD AUTO: 3.8 M/UL (ref 4–5.4)
RBC # BLD AUTO: 3.9 M/UL (ref 4–5.4)
RBC # BLD AUTO: 4.01 M/UL (ref 4–5.4)
RBC # BLD AUTO: 4.16 M/UL (ref 4–5.4)
RBC # BLD AUTO: 4.17 M/UL (ref 4–5.4)
RBC # BLD AUTO: 4.48 M/UL (ref 4–5.4)
RBC #/AREA URNS AUTO: 1 /HPF (ref 0–4)
RBC #/AREA URNS AUTO: 20 /HPF (ref 0–4)
RBC #/AREA URNS AUTO: 4 /HPF (ref 0–4)
RBC #/AREA URNS HPF: 1 /HPF (ref 0–4)
RESPIRATORY INFECTION PANEL SOURCE: NORMAL
RIGHT VENTRICULAR END-DIASTOLIC DIMENSION: 2.81 CM
RSV AG BY MOLECULAR METHOD: NOT DETECTED
RSV RNA NPH QL NAA+NON-PROBE: NOT DETECTED
RV TB RVSP: 6 MMHG
RV+EV RNA NPH QL NAA+NON-PROBE: NOT DETECTED
SALICYLATES SERPL-MCNC: <5 MG/DL (ref 15–30)
SALMONELLA SP: NOT DETECTED
SAMPLE: ABNORMAL
SAMPLE: NORMAL
SAMPLE: NORMAL
SARS-COV-2 RDRP RESP QL NAA+PROBE: NEGATIVE
SARS-COV-2 RNA RESP QL NAA+PROBE: NOT DETECTED
SCHISTOCYTES BLD QL SMEAR: ABNORMAL
SCHISTOCYTES BLD QL SMEAR: PRESENT
SERRATIA MARCESCENS: NOT DETECTED
SINUS: 2.63 CM
SITE: ABNORMAL
SITE: NORMAL
SITE: NORMAL
SODIUM BLD-SCNC: 131 MMOL/L (ref 136–145)
SODIUM BLD-SCNC: 132 MMOL/L (ref 136–145)
SODIUM SERPL-SCNC: 128 MMOL/L (ref 136–145)
SODIUM SERPL-SCNC: 128 MMOL/L (ref 136–145)
SODIUM SERPL-SCNC: 129 MMOL/L (ref 136–145)
SODIUM SERPL-SCNC: 130 MMOL/L (ref 136–145)
SODIUM SERPL-SCNC: 131 MMOL/L (ref 136–145)
SODIUM SERPL-SCNC: 132 MMOL/L (ref 136–145)
SODIUM SERPL-SCNC: 133 MMOL/L (ref 136–145)
SODIUM SERPL-SCNC: 133 MMOL/L (ref 136–145)
SODIUM SERPL-SCNC: 134 MMOL/L (ref 136–145)
SODIUM SERPL-SCNC: 134 MMOL/L (ref 136–145)
SODIUM SERPL-SCNC: 135 MMOL/L (ref 136–145)
SODIUM SERPL-SCNC: 135 MMOL/L (ref 136–145)
SODIUM SERPL-SCNC: 137 MMOL/L (ref 136–145)
SODIUM SERPL-SCNC: 138 MMOL/L (ref 136–145)
SODIUM SERPL-SCNC: 139 MMOL/L (ref 136–145)
SODIUM UR-SCNC: 55 MMOL/L (ref 20–250)
SODIUM UR-SCNC: 72 MMOL/L (ref 20–250)
SP GR UR STRIP: 1.01 (ref 1–1.03)
SP GR UR STRIP: 1.02 (ref 1–1.03)
SP GR UR STRIP: <=1.005 (ref 1–1.03)
SPECIMEN SOURCE: NORMAL
SPHEROCYTES BLD QL SMEAR: ABNORMAL
SQUAMOUS #/AREA URNS AUTO: 0 /HPF
SQUAMOUS #/AREA URNS AUTO: 1 /HPF
SQUAMOUS #/AREA URNS AUTO: 8 /HPF
SQUAMOUS #/AREA URNS HPF: 0 /HPF
STAPHYLOCOCCUS AUREUS: NOT DETECTED
STAPHYLOCOCCUS EPIDERMIDIS: DETECTED
STAPHYLOCOCCUS LUGDUNESIS: NOT DETECTED
STAPHYLOCOCCUS SPECIES: ABNORMAL
STENOTROPHOMONAS MALTOPHILIA: NOT DETECTED
STJ: 2.21 CM
STREPTOCOCCUS AGALACTIAE: NOT DETECTED
STREPTOCOCCUS PNEUMONIAE: NOT DETECTED
STREPTOCOCCUS PYOGENES: NOT DETECTED
STREPTOCOCCUS SPECIES: NOT DETECTED
TDI LATERAL: 0.09 M/S
TDI SEPTAL: 0.05 M/S
TDI: 0.07 M/S
TOXICOLOGY INFORMATION: NORMAL
TR MAX PG: 39 MMHG
TRICUSPID ANNULAR PLANE SYSTOLIC EXCURSION: 1.75 CM
TROPONIN I SERPL DL<=0.01 NG/ML-MCNC: 0.02 NG/ML (ref 0–0.03)
TROPONIN I SERPL DL<=0.01 NG/ML-MCNC: 0.02 NG/ML (ref 0–0.03)
TSH SERPL DL<=0.005 MIU/L-ACNC: 1.08 UIU/ML (ref 0.4–4)
TSH SERPL DL<=0.005 MIU/L-ACNC: 1.26 UIU/ML (ref 0.4–4)
TSH SERPL DL<=0.005 MIU/L-ACNC: 2.44 UIU/ML (ref 0.4–4)
TV REST PULMONARY ARTERY PRESSURE: 42 MMHG
URN SPEC COLLECT METH UR: ABNORMAL
VAN A/B: ABNORMAL
VANCOMYCIN SERPL-MCNC: 12.6 UG/ML
VIM GENE: ABNORMAL
VIT B1 BLD-MCNC: 68 UG/L (ref 38–122)
VIT B12 SERPL-MCNC: 1328 PG/ML (ref 210–950)
VIT C SERPL-MCNC: 2 MG/L (ref 2–19)
WBC # BLD AUTO: 10.81 K/UL (ref 3.9–12.7)
WBC # BLD AUTO: 11.48 K/UL (ref 3.9–12.7)
WBC # BLD AUTO: 11.84 K/UL (ref 3.9–12.7)
WBC # BLD AUTO: 12.69 K/UL (ref 3.9–12.7)
WBC # BLD AUTO: 2.95 K/UL (ref 3.9–12.7)
WBC # BLD AUTO: 5.71 K/UL (ref 3.9–12.7)
WBC # BLD AUTO: 6.42 K/UL (ref 3.9–12.7)
WBC # BLD AUTO: 6.49 K/UL (ref 3.9–12.7)
WBC # BLD AUTO: 6.58 K/UL (ref 3.9–12.7)
WBC # BLD AUTO: 6.95 K/UL (ref 3.9–12.7)
WBC # BLD AUTO: 7.04 K/UL (ref 3.9–12.7)
WBC # BLD AUTO: 7.23 K/UL (ref 3.9–12.7)
WBC # BLD AUTO: 7.58 K/UL (ref 3.9–12.7)
WBC # BLD AUTO: 8.03 K/UL (ref 3.9–12.7)
WBC # BLD AUTO: 8.07 K/UL (ref 3.9–12.7)
WBC # BLD AUTO: 8.22 K/UL (ref 3.9–12.7)
WBC # BLD AUTO: 9.09 K/UL (ref 3.9–12.7)
WBC #/AREA URNS AUTO: 9 /HPF (ref 0–5)
WBC #/AREA URNS AUTO: >100 /HPF (ref 0–5)
WBC #/AREA URNS AUTO: >100 /HPF (ref 0–5)
WBC #/AREA URNS HPF: 1 /HPF (ref 0–5)
WBC CLUMPS UR QL AUTO: ABNORMAL
Z-SCORE OF LEFT VENTRICULAR DIMENSION IN END DIASTOLE: -2.58
Z-SCORE OF LEFT VENTRICULAR DIMENSION IN END SYSTOLE: -2.38

## 2023-01-01 PROCEDURE — 99232 SBSQ HOSP IP/OBS MODERATE 35: CPT | Mod: ,,, | Performed by: STUDENT IN AN ORGANIZED HEALTH CARE EDUCATION/TRAINING PROGRAM

## 2023-01-01 PROCEDURE — 95700 EEG CONT REC W/VID EEG TECH: CPT

## 2023-01-01 PROCEDURE — 97167 OT EVAL HIGH COMPLEX 60 MIN: CPT

## 2023-01-01 PROCEDURE — 36415 COLL VENOUS BLD VENIPUNCTURE: CPT | Performed by: STUDENT IN AN ORGANIZED HEALTH CARE EDUCATION/TRAINING PROGRAM

## 2023-01-01 PROCEDURE — 82746 ASSAY OF FOLIC ACID SERUM: CPT | Performed by: STUDENT IN AN ORGANIZED HEALTH CARE EDUCATION/TRAINING PROGRAM

## 2023-01-01 PROCEDURE — 97112 NEUROMUSCULAR REEDUCATION: CPT

## 2023-01-01 PROCEDURE — 94761 N-INVAS EAR/PLS OXIMETRY MLT: CPT

## 2023-01-01 PROCEDURE — 84100 ASSAY OF PHOSPHORUS: CPT | Performed by: STUDENT IN AN ORGANIZED HEALTH CARE EDUCATION/TRAINING PROGRAM

## 2023-01-01 PROCEDURE — 95720 EEG PHY/QHP EA INCR W/VEEG: CPT | Mod: ,,, | Performed by: PSYCHIATRY & NEUROLOGY

## 2023-01-01 PROCEDURE — 25000242 PHARM REV CODE 250 ALT 637 W/ HCPCS: Performed by: HOSPITALIST

## 2023-01-01 PROCEDURE — 25000003 PHARM REV CODE 250: Performed by: HOSPITALIST

## 2023-01-01 PROCEDURE — 96372 PR INJECTION,THERAP/PROPH/DIAG2ST, IM OR SUBCUT: ICD-10-PCS | Mod: S$GLB,,, | Performed by: INTERNAL MEDICINE

## 2023-01-01 PROCEDURE — 95711 VEEG 2-12 HR UNMONITORED: CPT

## 2023-01-01 PROCEDURE — 84443 ASSAY THYROID STIM HORMONE: CPT | Performed by: INTERNAL MEDICINE

## 2023-01-01 PROCEDURE — 76536 US SOFT TISSUE HEAD NECK THYROID: ICD-10-PCS | Mod: 26,,, | Performed by: RADIOLOGY

## 2023-01-01 PROCEDURE — G0378 HOSPITAL OBSERVATION PER HR: HCPCS

## 2023-01-01 PROCEDURE — 99441 PR PHYSICIAN TELEPHONE EVALUATION 5-10 MIN: CPT | Mod: 95,,, | Performed by: INTERNAL MEDICINE

## 2023-01-01 PROCEDURE — 82962 GLUCOSE BLOOD TEST: CPT

## 2023-01-01 PROCEDURE — 96366 THER/PROPH/DIAG IV INF ADDON: CPT

## 2023-01-01 PROCEDURE — 92526 ORAL FUNCTION THERAPY: CPT

## 2023-01-01 PROCEDURE — 63600175 PHARM REV CODE 636 W HCPCS: Performed by: HOSPITALIST

## 2023-01-01 PROCEDURE — 25000003 PHARM REV CODE 250: Performed by: STUDENT IN AN ORGANIZED HEALTH CARE EDUCATION/TRAINING PROGRAM

## 2023-01-01 PROCEDURE — 25000242 PHARM REV CODE 250 ALT 637 W/ HCPCS

## 2023-01-01 PROCEDURE — 94640 AIRWAY INHALATION TREATMENT: CPT

## 2023-01-01 PROCEDURE — 99232 PR SUBSEQUENT HOSPITAL CARE,LEVL II: ICD-10-PCS | Mod: ,,, | Performed by: STUDENT IN AN ORGANIZED HEALTH CARE EDUCATION/TRAINING PROGRAM

## 2023-01-01 PROCEDURE — 83735 ASSAY OF MAGNESIUM: CPT

## 2023-01-01 PROCEDURE — 99451 NTRPROF PH1/NTRNET/EHR 5/>: CPT | Mod: ,,, | Performed by: INTERNAL MEDICINE

## 2023-01-01 PROCEDURE — 99233 PR SUBSEQUENT HOSPITAL CARE,LEVL III: ICD-10-PCS | Mod: ,,, | Performed by: STUDENT IN AN ORGANIZED HEALTH CARE EDUCATION/TRAINING PROGRAM

## 2023-01-01 PROCEDURE — 25000003 PHARM REV CODE 250

## 2023-01-01 PROCEDURE — 36415 COLL VENOUS BLD VENIPUNCTURE: CPT | Performed by: HOSPITALIST

## 2023-01-01 PROCEDURE — 99350 HOME/RES VST EST HIGH MDM 60: CPT | Mod: S$GLB,,, | Performed by: NURSE PRACTITIONER

## 2023-01-01 PROCEDURE — 82565 ASSAY OF CREATININE: CPT | Performed by: STUDENT IN AN ORGANIZED HEALTH CARE EDUCATION/TRAINING PROGRAM

## 2023-01-01 PROCEDURE — 96365 THER/PROPH/DIAG IV INF INIT: CPT

## 2023-01-01 PROCEDURE — 99999 PR PBB SHADOW E&M-EST. PATIENT-LVL V: CPT | Mod: PBBFAC,,, | Performed by: INTERNAL MEDICINE

## 2023-01-01 PROCEDURE — 99999 PR PBB SHADOW E&M-EST. PATIENT-LVL II: ICD-10-PCS | Mod: PBBFAC,,, | Performed by: INTERNAL MEDICINE

## 2023-01-01 PROCEDURE — 99490 CHRNC CARE MGMT STAFF 1ST 20: CPT | Mod: PBBFAC | Performed by: FAMILY MEDICINE

## 2023-01-01 PROCEDURE — G0180 MD CERTIFICATION HHA PATIENT: HCPCS | Mod: ,,, | Performed by: FAMILY MEDICINE

## 2023-01-01 PROCEDURE — 97530 THERAPEUTIC ACTIVITIES: CPT

## 2023-01-01 PROCEDURE — 99222 PR INITIAL HOSPITAL CARE,LEVL II: ICD-10-PCS | Mod: ,,, | Performed by: STUDENT IN AN ORGANIZED HEALTH CARE EDUCATION/TRAINING PROGRAM

## 2023-01-01 PROCEDURE — 87040 BLOOD CULTURE FOR BACTERIA: CPT | Mod: 59 | Performed by: EMERGENCY MEDICINE

## 2023-01-01 PROCEDURE — 25000242 PHARM REV CODE 250 ALT 637 W/ HCPCS: Performed by: STUDENT IN AN ORGANIZED HEALTH CARE EDUCATION/TRAINING PROGRAM

## 2023-01-01 PROCEDURE — 25000003 PHARM REV CODE 250: Performed by: EMERGENCY MEDICINE

## 2023-01-01 PROCEDURE — G0180 PR HOME HEALTH MD CERTIFICATION: ICD-10-PCS | Mod: ,,, | Performed by: FAMILY MEDICINE

## 2023-01-01 PROCEDURE — 82180 ASSAY OF ASCORBIC ACID: CPT | Performed by: STUDENT IN AN ORGANIZED HEALTH CARE EDUCATION/TRAINING PROGRAM

## 2023-01-01 PROCEDURE — 99212 OFFICE O/P EST SF 10 MIN: CPT | Mod: S$GLB,,, | Performed by: INTERNAL MEDICINE

## 2023-01-01 PROCEDURE — 83735 ASSAY OF MAGNESIUM: CPT | Performed by: STUDENT IN AN ORGANIZED HEALTH CARE EDUCATION/TRAINING PROGRAM

## 2023-01-01 PROCEDURE — 86580 TB INTRADERMAL TEST: CPT | Performed by: HOSPITALIST

## 2023-01-01 PROCEDURE — 94760 N-INVAS EAR/PLS OXIMETRY 1: CPT

## 2023-01-01 PROCEDURE — 99999 PR PBB SHADOW E&M-EST. PATIENT-LVL V: ICD-10-PCS | Mod: PBBFAC,,, | Performed by: INTERNAL MEDICINE

## 2023-01-01 PROCEDURE — 99232 PR SUBSEQUENT HOSPITAL CARE,LEVL II: ICD-10-PCS | Mod: GC,,, | Performed by: HOSPITALIST

## 2023-01-01 PROCEDURE — 99490 PR CHRONIC CARE MGMT, 1ST 20 MIN: ICD-10-PCS | Mod: S$PBB,,, | Performed by: FAMILY MEDICINE

## 2023-01-01 PROCEDURE — 80053 COMPREHEN METABOLIC PANEL: CPT | Performed by: NURSE PRACTITIONER

## 2023-01-01 PROCEDURE — 99490 CHRNC CARE MGMT STAFF 1ST 20: CPT | Mod: S$PBB,,, | Performed by: FAMILY MEDICINE

## 2023-01-01 PROCEDURE — 99212 OFFICE O/P EST SF 10 MIN: CPT | Mod: 25,S$GLB,, | Performed by: INTERNAL MEDICINE

## 2023-01-01 PROCEDURE — 83735 ASSAY OF MAGNESIUM: CPT | Performed by: HOSPITALIST

## 2023-01-01 PROCEDURE — 97535 SELF CARE MNGMENT TRAINING: CPT

## 2023-01-01 PROCEDURE — 85025 COMPLETE CBC W/AUTO DIFF WBC: CPT | Performed by: HOSPITALIST

## 2023-01-01 PROCEDURE — 97165 OT EVAL LOW COMPLEX 30 MIN: CPT

## 2023-01-01 PROCEDURE — 99497 PR ADVNCD CARE PLAN 30 MIN: ICD-10-PCS | Mod: S$GLB,,, | Performed by: NURSE PRACTITIONER

## 2023-01-01 PROCEDURE — 99233 SBSQ HOSP IP/OBS HIGH 50: CPT | Mod: ,,, | Performed by: HOSPITALIST

## 2023-01-01 PROCEDURE — 97530 THERAPEUTIC ACTIVITIES: CPT | Mod: CQ

## 2023-01-01 PROCEDURE — 80053 COMPREHEN METABOLIC PANEL: CPT | Performed by: EMERGENCY MEDICINE

## 2023-01-01 PROCEDURE — 84100 ASSAY OF PHOSPHORUS: CPT | Performed by: HOSPITALIST

## 2023-01-01 PROCEDURE — 0241U SARS-COV2 (COVID) WITH FLU/RSV BY PCR: CPT | Performed by: INTERNAL MEDICINE

## 2023-01-01 PROCEDURE — 99900035 HC TECH TIME PER 15 MIN (STAT)

## 2023-01-01 PROCEDURE — 87086 URINE CULTURE/COLONY COUNT: CPT | Mod: 59 | Performed by: HOSPITALIST

## 2023-01-01 PROCEDURE — 11000001 HC ACUTE MED/SURG PRIVATE ROOM

## 2023-01-01 PROCEDURE — 84484 ASSAY OF TROPONIN QUANT: CPT | Performed by: EMERGENCY MEDICINE

## 2023-01-01 PROCEDURE — G0180 PR HOME HEALTH MD CERTIFICATION: ICD-10-PCS | Mod: ,,, | Performed by: INTERNAL MEDICINE

## 2023-01-01 PROCEDURE — 99232 SBSQ HOSP IP/OBS MODERATE 35: CPT | Mod: GC,,, | Performed by: STUDENT IN AN ORGANIZED HEALTH CARE EDUCATION/TRAINING PROGRAM

## 2023-01-01 PROCEDURE — 99233 PR SUBSEQUENT HOSPITAL CARE,LEVL III: ICD-10-PCS | Mod: GC,,, | Performed by: PSYCHIATRY & NEUROLOGY

## 2023-01-01 PROCEDURE — 93005 ELECTROCARDIOGRAM TRACING: CPT

## 2023-01-01 PROCEDURE — 99212 PR OFFICE/OUTPT VISIT, EST, LEVL II, 10-19 MIN: ICD-10-PCS | Mod: 25,S$GLB,, | Performed by: INTERNAL MEDICINE

## 2023-01-01 PROCEDURE — 63600175 PHARM REV CODE 636 W HCPCS

## 2023-01-01 PROCEDURE — 82607 VITAMIN B-12: CPT | Performed by: STUDENT IN AN ORGANIZED HEALTH CARE EDUCATION/TRAINING PROGRAM

## 2023-01-01 PROCEDURE — 73130 X-RAY EXAM OF HAND: CPT | Mod: TC,50,FY

## 2023-01-01 PROCEDURE — 80143 DRUG ASSAY ACETAMINOPHEN: CPT | Performed by: INTERNAL MEDICINE

## 2023-01-01 PROCEDURE — 99350 PR HOME VISIT,ESTAB PATIENT,LEVEL IV: ICD-10-PCS | Mod: S$GLB,,, | Performed by: NURSE PRACTITIONER

## 2023-01-01 PROCEDURE — 96361 HYDRATE IV INFUSION ADD-ON: CPT

## 2023-01-01 PROCEDURE — 85025 COMPLETE CBC W/AUTO DIFF WBC: CPT | Performed by: INTERNAL MEDICINE

## 2023-01-01 PROCEDURE — 99214 OFFICE O/P EST MOD 30 MIN: CPT | Mod: S$PBB,,, | Performed by: INTERNAL MEDICINE

## 2023-01-01 PROCEDURE — 82803 BLOOD GASES ANY COMBINATION: CPT

## 2023-01-01 PROCEDURE — 99213 OFFICE O/P EST LOW 20 MIN: CPT | Mod: PBBFAC | Performed by: STUDENT IN AN ORGANIZED HEALTH CARE EDUCATION/TRAINING PROGRAM

## 2023-01-01 PROCEDURE — 95816 PR EEG,W/AWAKE & DROWSY RECORD: ICD-10-PCS | Mod: 26,,, | Performed by: PSYCHIATRY & NEUROLOGY

## 2023-01-01 PROCEDURE — 80053 COMPREHEN METABOLIC PANEL: CPT | Performed by: STUDENT IN AN ORGANIZED HEALTH CARE EDUCATION/TRAINING PROGRAM

## 2023-01-01 PROCEDURE — 80053 COMPREHEN METABOLIC PANEL: CPT

## 2023-01-01 PROCEDURE — 21400001 HC TELEMETRY ROOM

## 2023-01-01 PROCEDURE — 96360 HYDRATION IV INFUSION INIT: CPT

## 2023-01-01 PROCEDURE — 83935 ASSAY OF URINE OSMOLALITY: CPT

## 2023-01-01 PROCEDURE — 86580 TB INTRADERMAL TEST: CPT

## 2023-01-01 PROCEDURE — 71046 XR CHEST PA AND LATERAL: ICD-10-PCS | Mod: 26,,, | Performed by: RADIOLOGY

## 2023-01-01 PROCEDURE — 93010 ELECTROCARDIOGRAM REPORT: CPT | Mod: ,,, | Performed by: INTERNAL MEDICINE

## 2023-01-01 PROCEDURE — 99233 PR SUBSEQUENT HOSPITAL CARE,LEVL III: ICD-10-PCS | Mod: ,,, | Performed by: HOSPITALIST

## 2023-01-01 PROCEDURE — 83090 ASSAY OF HOMOCYSTEINE: CPT | Performed by: STUDENT IN AN ORGANIZED HEALTH CARE EDUCATION/TRAINING PROGRAM

## 2023-01-01 PROCEDURE — 99213 PR OFFICE/OUTPT VISIT, EST, LEVL III, 20-29 MIN: ICD-10-PCS | Mod: S$GLB,,, | Performed by: INTERNAL MEDICINE

## 2023-01-01 PROCEDURE — 84207 ASSAY OF VITAMIN B-6: CPT | Performed by: STUDENT IN AN ORGANIZED HEALTH CARE EDUCATION/TRAINING PROGRAM

## 2023-01-01 PROCEDURE — 99223 1ST HOSP IP/OBS HIGH 75: CPT | Mod: GC,,, | Performed by: STUDENT IN AN ORGANIZED HEALTH CARE EDUCATION/TRAINING PROGRAM

## 2023-01-01 PROCEDURE — 83735 ASSAY OF MAGNESIUM: CPT | Performed by: NURSE PRACTITIONER

## 2023-01-01 PROCEDURE — 84484 ASSAY OF TROPONIN QUANT: CPT | Performed by: INTERNAL MEDICINE

## 2023-01-01 PROCEDURE — 99451 PR INTERPROF, PHONE/INTERNET/EHR, CONSULT, >= 5MINS: ICD-10-PCS | Mod: ,,, | Performed by: INTERNAL MEDICINE

## 2023-01-01 PROCEDURE — 99223 PR INITIAL HOSPITAL CARE,LEVL III: ICD-10-PCS | Mod: AI,GC,, | Performed by: STUDENT IN AN ORGANIZED HEALTH CARE EDUCATION/TRAINING PROGRAM

## 2023-01-01 PROCEDURE — 80048 BASIC METABOLIC PNL TOTAL CA: CPT | Performed by: HOSPITALIST

## 2023-01-01 PROCEDURE — 81001 URINALYSIS AUTO W/SCOPE: CPT | Performed by: EMERGENCY MEDICINE

## 2023-01-01 PROCEDURE — 84425 ASSAY OF VITAMIN B-1: CPT | Performed by: STUDENT IN AN ORGANIZED HEALTH CARE EDUCATION/TRAINING PROGRAM

## 2023-01-01 PROCEDURE — 83735 ASSAY OF MAGNESIUM: CPT | Performed by: EMERGENCY MEDICINE

## 2023-01-01 PROCEDURE — 95813 PR EEG, EXTENDED, 61-119 MINS: ICD-10-PCS | Mod: 26,,, | Performed by: PSYCHIATRY & NEUROLOGY

## 2023-01-01 PROCEDURE — 99350 PR HOME VISIT,ESTAB PATIENT,LEVEL IV: ICD-10-PCS | Mod: 25,S$GLB,, | Performed by: NURSE PRACTITIONER

## 2023-01-01 PROCEDURE — 99233 SBSQ HOSP IP/OBS HIGH 50: CPT | Mod: GC,,, | Performed by: HOSPITALIST

## 2023-01-01 PROCEDURE — 99285 EMERGENCY DEPT VISIT HI MDM: CPT | Mod: 25

## 2023-01-01 PROCEDURE — 87449 NOS EACH ORGANISM AG IA: CPT

## 2023-01-01 PROCEDURE — 95813 EEG EXTND MNTR 61-119 MIN: CPT | Mod: 26,,, | Performed by: PSYCHIATRY & NEUROLOGY

## 2023-01-01 PROCEDURE — 84295 ASSAY OF SERUM SODIUM: CPT | Performed by: FAMILY MEDICINE

## 2023-01-01 PROCEDURE — 84443 ASSAY THYROID STIM HORMONE: CPT | Performed by: HOSPITALIST

## 2023-01-01 PROCEDURE — 99215 OFFICE O/P EST HI 40 MIN: CPT | Mod: PBBFAC | Performed by: INTERNAL MEDICINE

## 2023-01-01 PROCEDURE — 83605 ASSAY OF LACTIC ACID: CPT | Performed by: EMERGENCY MEDICINE

## 2023-01-01 PROCEDURE — 85025 COMPLETE CBC W/AUTO DIFF WBC: CPT | Performed by: NURSE PRACTITIONER

## 2023-01-01 PROCEDURE — 99239 PR HOSPITAL DISCHARGE DAY,>30 MIN: ICD-10-PCS | Mod: GC,,, | Performed by: HOSPITALIST

## 2023-01-01 PROCEDURE — 83930 ASSAY OF BLOOD OSMOLALITY: CPT

## 2023-01-01 PROCEDURE — 85025 COMPLETE CBC W/AUTO DIFF WBC: CPT | Performed by: STUDENT IN AN ORGANIZED HEALTH CARE EDUCATION/TRAINING PROGRAM

## 2023-01-01 PROCEDURE — 99232 PR SUBSEQUENT HOSPITAL CARE,LEVL II: ICD-10-PCS | Mod: GC,,, | Performed by: STUDENT IN AN ORGANIZED HEALTH CARE EDUCATION/TRAINING PROGRAM

## 2023-01-01 PROCEDURE — 25000003 PHARM REV CODE 250: Performed by: NURSE PRACTITIONER

## 2023-01-01 PROCEDURE — 87635 SARS-COV-2 COVID-19 AMP PRB: CPT | Performed by: HOSPITALIST

## 2023-01-01 PROCEDURE — 93010 EKG 12-LEAD: ICD-10-PCS | Mod: ,,, | Performed by: INTERNAL MEDICINE

## 2023-01-01 PROCEDURE — 94645 CONT INHLJ TX EACH ADDL HOUR: CPT

## 2023-01-01 PROCEDURE — 63600175 PHARM REV CODE 636 W HCPCS: Performed by: NURSE PRACTITIONER

## 2023-01-01 PROCEDURE — 92610 EVALUATE SWALLOWING FUNCTION: CPT

## 2023-01-01 PROCEDURE — 80047 BASIC METABLC PNL IONIZED CA: CPT

## 2023-01-01 PROCEDURE — 86580 TB INTRADERMAL TEST: CPT | Performed by: STUDENT IN AN ORGANIZED HEALTH CARE EDUCATION/TRAINING PROGRAM

## 2023-01-01 PROCEDURE — 99232 SBSQ HOSP IP/OBS MODERATE 35: CPT | Mod: ,,, | Performed by: HOSPITALIST

## 2023-01-01 PROCEDURE — 84145 PROCALCITONIN (PCT): CPT | Performed by: NURSE PRACTITIONER

## 2023-01-01 PROCEDURE — 51798 US URINE CAPACITY MEASURE: CPT

## 2023-01-01 PROCEDURE — 63600175 PHARM REV CODE 636 W HCPCS: Performed by: EMERGENCY MEDICINE

## 2023-01-01 PROCEDURE — 99223 PR INITIAL HOSPITAL CARE,LEVL III: ICD-10-PCS | Mod: GC,,, | Performed by: STUDENT IN AN ORGANIZED HEALTH CARE EDUCATION/TRAINING PROGRAM

## 2023-01-01 PROCEDURE — 30200315 PPD INTRADERMAL TEST REV CODE 302: Performed by: STUDENT IN AN ORGANIZED HEALTH CARE EDUCATION/TRAINING PROGRAM

## 2023-01-01 PROCEDURE — 87502 INFLUENZA DNA AMP PROBE: CPT | Performed by: EMERGENCY MEDICINE

## 2023-01-01 PROCEDURE — 85347 COAGULATION TIME ACTIVATED: CPT

## 2023-01-01 PROCEDURE — 81001 URINALYSIS AUTO W/SCOPE: CPT | Performed by: NURSE PRACTITIONER

## 2023-01-01 PROCEDURE — 99223 PR INITIAL HOSPITAL CARE,LEVL III: ICD-10-PCS | Mod: ,,, | Performed by: PSYCHIATRY & NEUROLOGY

## 2023-01-01 PROCEDURE — 99900031 HC PATIENT EDUCATION (STAT)

## 2023-01-01 PROCEDURE — 97116 GAIT TRAINING THERAPY: CPT

## 2023-01-01 PROCEDURE — 84300 ASSAY OF URINE SODIUM: CPT

## 2023-01-01 PROCEDURE — 95718 EEG PHYS/QHP 2-12 HR W/VEEG: CPT | Mod: ,,, | Performed by: PSYCHIATRY & NEUROLOGY

## 2023-01-01 PROCEDURE — 99233 SBSQ HOSP IP/OBS HIGH 50: CPT | Mod: ,,, | Performed by: STUDENT IN AN ORGANIZED HEALTH CARE EDUCATION/TRAINING PROGRAM

## 2023-01-01 PROCEDURE — 83605 ASSAY OF LACTIC ACID: CPT

## 2023-01-01 PROCEDURE — 63600175 PHARM REV CODE 636 W HCPCS: Performed by: INTERNAL MEDICINE

## 2023-01-01 PROCEDURE — 84100 ASSAY OF PHOSPHORUS: CPT

## 2023-01-01 PROCEDURE — 87635 SARS-COV-2 COVID-19 AMP PRB: CPT | Performed by: STUDENT IN AN ORGANIZED HEALTH CARE EDUCATION/TRAINING PROGRAM

## 2023-01-01 PROCEDURE — 84156 ASSAY OF PROTEIN URINE: CPT | Performed by: STUDENT IN AN ORGANIZED HEALTH CARE EDUCATION/TRAINING PROGRAM

## 2023-01-01 PROCEDURE — 36415 COLL VENOUS BLD VENIPUNCTURE: CPT

## 2023-01-01 PROCEDURE — 99239 PR HOSPITAL DISCHARGE DAY,>30 MIN: ICD-10-PCS | Mod: GC,,, | Performed by: STUDENT IN AN ORGANIZED HEALTH CARE EDUCATION/TRAINING PROGRAM

## 2023-01-01 PROCEDURE — 95720 PR EEG, W/VIDEO, CONT RECORD, I&R, >12<26 HRS: ICD-10-PCS | Mod: ,,, | Performed by: PSYCHIATRY & NEUROLOGY

## 2023-01-01 PROCEDURE — 82800 BLOOD PH: CPT

## 2023-01-01 PROCEDURE — 99239 HOSP IP/OBS DSCHRG MGMT >30: CPT | Mod: GC,,, | Performed by: STUDENT IN AN ORGANIZED HEALTH CARE EDUCATION/TRAINING PROGRAM

## 2023-01-01 PROCEDURE — 84300 ASSAY OF URINE SODIUM: CPT | Performed by: STUDENT IN AN ORGANIZED HEALTH CARE EDUCATION/TRAINING PROGRAM

## 2023-01-01 PROCEDURE — 25000003 PHARM REV CODE 250: Performed by: INTERNAL MEDICINE

## 2023-01-01 PROCEDURE — 99233 PR SUBSEQUENT HOSPITAL CARE,LEVL III: ICD-10-PCS | Mod: GC,,, | Performed by: HOSPITALIST

## 2023-01-01 PROCEDURE — 30200315 PPD INTRADERMAL TEST REV CODE 302: Performed by: HOSPITALIST

## 2023-01-01 PROCEDURE — 82436 ASSAY OF URINE CHLORIDE: CPT | Performed by: STUDENT IN AN ORGANIZED HEALTH CARE EDUCATION/TRAINING PROGRAM

## 2023-01-01 PROCEDURE — S0166 INJ OLANZAPINE 2.5MG: HCPCS | Performed by: STUDENT IN AN ORGANIZED HEALTH CARE EDUCATION/TRAINING PROGRAM

## 2023-01-01 PROCEDURE — 81000 URINALYSIS NONAUTO W/SCOPE: CPT | Performed by: NURSE PRACTITIONER

## 2023-01-01 PROCEDURE — 99214 PR OFFICE/OUTPT VISIT, EST, LEVL IV, 30-39 MIN: ICD-10-PCS | Mod: S$PBB,,, | Performed by: INTERNAL MEDICINE

## 2023-01-01 PROCEDURE — 99285 EMERGENCY DEPT VISIT HI MDM: CPT | Mod: ,,, | Performed by: STUDENT IN AN ORGANIZED HEALTH CARE EDUCATION/TRAINING PROGRAM

## 2023-01-01 PROCEDURE — 80202 ASSAY OF VANCOMYCIN: CPT | Performed by: HOSPITALIST

## 2023-01-01 PROCEDURE — 99348 PR HOME VISIT,ESTAB PATIENT,LEVEL II: ICD-10-PCS | Mod: S$GLB,,, | Performed by: NURSE PRACTITIONER

## 2023-01-01 PROCEDURE — 30200315 PPD INTRADERMAL TEST REV CODE 302

## 2023-01-01 PROCEDURE — 76536 US EXAM OF HEAD AND NECK: CPT | Mod: TC

## 2023-01-01 PROCEDURE — 99233 SBSQ HOSP IP/OBS HIGH 50: CPT | Mod: GC,,, | Performed by: PSYCHIATRY & NEUROLOGY

## 2023-01-01 PROCEDURE — 99441 PR PHYSICIAN TELEPHONE EVALUATION 5-10 MIN: ICD-10-PCS | Mod: 95,,, | Performed by: INTERNAL MEDICINE

## 2023-01-01 PROCEDURE — 87040 BLOOD CULTURE FOR BACTERIA: CPT | Performed by: HOSPITALIST

## 2023-01-01 PROCEDURE — 20600001 HC STEP DOWN PRIVATE ROOM

## 2023-01-01 PROCEDURE — 96372 THER/PROPH/DIAG INJ SC/IM: CPT | Mod: S$GLB,,, | Performed by: INTERNAL MEDICINE

## 2023-01-01 PROCEDURE — 99213 OFFICE O/P EST LOW 20 MIN: CPT | Mod: S$GLB,,, | Performed by: INTERNAL MEDICINE

## 2023-01-01 PROCEDURE — 99223 PR INITIAL HOSPITAL CARE,LEVL III: ICD-10-PCS | Mod: ,,, | Performed by: NURSE PRACTITIONER

## 2023-01-01 PROCEDURE — 36415 COLL VENOUS BLD VENIPUNCTURE: CPT | Performed by: NURSE PRACTITIONER

## 2023-01-01 PROCEDURE — 99348 HOME/RES VST EST LOW MDM 30: CPT | Mod: S$GLB,,, | Performed by: NURSE PRACTITIONER

## 2023-01-01 PROCEDURE — 99239 HOSP IP/OBS DSCHRG MGMT >30: CPT | Mod: ,,, | Performed by: STUDENT IN AN ORGANIZED HEALTH CARE EDUCATION/TRAINING PROGRAM

## 2023-01-01 PROCEDURE — 99212 OFFICE O/P EST SF 10 MIN: CPT | Mod: PBBFAC | Performed by: INTERNAL MEDICINE

## 2023-01-01 PROCEDURE — 99222 1ST HOSP IP/OBS MODERATE 55: CPT | Mod: ,,, | Performed by: STUDENT IN AN ORGANIZED HEALTH CARE EDUCATION/TRAINING PROGRAM

## 2023-01-01 PROCEDURE — 63600175 PHARM REV CODE 636 W HCPCS: Performed by: STUDENT IN AN ORGANIZED HEALTH CARE EDUCATION/TRAINING PROGRAM

## 2023-01-01 PROCEDURE — U0002 COVID-19 LAB TEST NON-CDC: HCPCS | Performed by: EMERGENCY MEDICINE

## 2023-01-01 PROCEDURE — 76536 US EXAM OF HEAD AND NECK: CPT | Mod: 26,,, | Performed by: RADIOLOGY

## 2023-01-01 PROCEDURE — 84100 ASSAY OF PHOSPHORUS: CPT | Performed by: NURSE PRACTITIONER

## 2023-01-01 PROCEDURE — 99232 SBSQ HOSP IP/OBS MODERATE 35: CPT | Mod: GC,,, | Performed by: HOSPITALIST

## 2023-01-01 PROCEDURE — 99223 1ST HOSP IP/OBS HIGH 75: CPT | Mod: AI,GC,, | Performed by: STUDENT IN AN ORGANIZED HEALTH CARE EDUCATION/TRAINING PROGRAM

## 2023-01-01 PROCEDURE — 99239 PR HOSPITAL DISCHARGE DAY,>30 MIN: ICD-10-PCS | Mod: ,,, | Performed by: STUDENT IN AN ORGANIZED HEALTH CARE EDUCATION/TRAINING PROGRAM

## 2023-01-01 PROCEDURE — 99212 PR OFFICE/OUTPT VISIT, EST, LEVL II, 10-19 MIN: ICD-10-PCS | Mod: S$GLB,,, | Performed by: INTERNAL MEDICINE

## 2023-01-01 PROCEDURE — 99223 PR INITIAL HOSPITAL CARE,LEVL III: ICD-10-PCS | Mod: AI,,, | Performed by: HOSPITALIST

## 2023-01-01 PROCEDURE — 85025 COMPLETE CBC W/AUTO DIFF WBC: CPT

## 2023-01-01 PROCEDURE — 87154 CUL TYP ID BLD PTHGN 6+ TRGT: CPT | Performed by: EMERGENCY MEDICINE

## 2023-01-01 PROCEDURE — 97162 PT EVAL MOD COMPLEX 30 MIN: CPT

## 2023-01-01 PROCEDURE — 95718 PR EEG, W/VIDEO, CONT RECORD, I&R, 2-12 HRS: ICD-10-PCS | Mod: ,,, | Performed by: PSYCHIATRY & NEUROLOGY

## 2023-01-01 PROCEDURE — 99239 HOSP IP/OBS DSCHRG MGMT >30: CPT | Mod: GC,,, | Performed by: HOSPITALIST

## 2023-01-01 PROCEDURE — 99350 HOME/RES VST EST HIGH MDM 60: CPT | Mod: 25,S$GLB,, | Performed by: NURSE PRACTITIONER

## 2023-01-01 PROCEDURE — 99232 PR SUBSEQUENT HOSPITAL CARE,LEVL II: ICD-10-PCS | Mod: ,,, | Performed by: HOSPITALIST

## 2023-01-01 PROCEDURE — 80053 COMPREHEN METABOLIC PANEL: CPT | Performed by: INTERNAL MEDICINE

## 2023-01-01 PROCEDURE — 36415 COLL VENOUS BLD VENIPUNCTURE: CPT | Performed by: FAMILY MEDICINE

## 2023-01-01 PROCEDURE — 81003 URINALYSIS AUTO W/O SCOPE: CPT | Performed by: INTERNAL MEDICINE

## 2023-01-01 PROCEDURE — 96368 THER/DIAG CONCURRENT INF: CPT

## 2023-01-01 PROCEDURE — G0180 MD CERTIFICATION HHA PATIENT: HCPCS | Mod: ,,, | Performed by: INTERNAL MEDICINE

## 2023-01-01 PROCEDURE — 87040 BLOOD CULTURE FOR BACTERIA: CPT | Mod: 59 | Performed by: INTERNAL MEDICINE

## 2023-01-01 PROCEDURE — 87040 BLOOD CULTURE FOR BACTERIA: CPT | Mod: 59 | Performed by: NURSE PRACTITIONER

## 2023-01-01 PROCEDURE — 71046 X-RAY EXAM CHEST 2 VIEWS: CPT | Mod: TC,FY

## 2023-01-01 PROCEDURE — 83605 ASSAY OF LACTIC ACID: CPT | Performed by: NURSE PRACTITIONER

## 2023-01-01 PROCEDURE — 80053 COMPREHEN METABOLIC PANEL: CPT | Performed by: HOSPITALIST

## 2023-01-01 PROCEDURE — 80179 DRUG ASSAY SALICYLATE: CPT | Performed by: INTERNAL MEDICINE

## 2023-01-01 PROCEDURE — 85025 COMPLETE CBC W/AUTO DIFF WBC: CPT | Performed by: EMERGENCY MEDICINE

## 2023-01-01 PROCEDURE — 82570 ASSAY OF URINE CREATININE: CPT

## 2023-01-01 PROCEDURE — 99999 PR PBB SHADOW E&M-EST. PATIENT-LVL II: CPT | Mod: PBBFAC,,, | Performed by: INTERNAL MEDICINE

## 2023-01-01 PROCEDURE — 83935 ASSAY OF URINE OSMOLALITY: CPT | Performed by: STUDENT IN AN ORGANIZED HEALTH CARE EDUCATION/TRAINING PROGRAM

## 2023-01-01 PROCEDURE — 95816 EEG AWAKE AND DROWSY: CPT

## 2023-01-01 PROCEDURE — 99497 ADVNCD CARE PLAN 30 MIN: CPT | Mod: S$GLB,,, | Performed by: NURSE PRACTITIONER

## 2023-01-01 PROCEDURE — 94664 DEMO&/EVAL PT USE INHALER: CPT

## 2023-01-01 PROCEDURE — 73130 XR HAND COMPLETE 3 VIEWS BILATERAL: ICD-10-PCS | Mod: 26,50,, | Performed by: RADIOLOGY

## 2023-01-01 PROCEDURE — 99223 1ST HOSP IP/OBS HIGH 75: CPT | Mod: ,,, | Performed by: PSYCHIATRY & NEUROLOGY

## 2023-01-01 PROCEDURE — 81001 URINALYSIS AUTO W/SCOPE: CPT | Mod: 91 | Performed by: HOSPITALIST

## 2023-01-01 PROCEDURE — 84443 ASSAY THYROID STIM HORMONE: CPT | Performed by: EMERGENCY MEDICINE

## 2023-01-01 PROCEDURE — 96367 TX/PROPH/DG ADDL SEQ IV INF: CPT

## 2023-01-01 PROCEDURE — 87798 DETECT AGENT NOS DNA AMP: CPT | Performed by: STUDENT IN AN ORGANIZED HEALTH CARE EDUCATION/TRAINING PROGRAM

## 2023-01-01 PROCEDURE — 95816 EEG AWAKE AND DROWSY: CPT | Mod: 26,,, | Performed by: PSYCHIATRY & NEUROLOGY

## 2023-01-01 PROCEDURE — 99223 1ST HOSP IP/OBS HIGH 75: CPT | Mod: AI,,, | Performed by: HOSPITALIST

## 2023-01-01 PROCEDURE — 73130 X-RAY EXAM OF HAND: CPT | Mod: 26,50,, | Performed by: RADIOLOGY

## 2023-01-01 PROCEDURE — 99999 PR PBB SHADOW E&M-EST. PATIENT-LVL III: CPT | Mod: PBBFAC,GC,, | Performed by: STUDENT IN AN ORGANIZED HEALTH CARE EDUCATION/TRAINING PROGRAM

## 2023-01-01 PROCEDURE — 95714 VEEG EA 12-26 HR UNMNTR: CPT

## 2023-01-01 PROCEDURE — 99223 1ST HOSP IP/OBS HIGH 75: CPT | Mod: ,,, | Performed by: NURSE PRACTITIONER

## 2023-01-01 PROCEDURE — 80307 DRUG TEST PRSMV CHEM ANLYZR: CPT | Performed by: INTERNAL MEDICINE

## 2023-01-01 PROCEDURE — 99999 PR PBB SHADOW E&M-EST. PATIENT-LVL III: ICD-10-PCS | Mod: PBBFAC,GC,, | Performed by: STUDENT IN AN ORGANIZED HEALTH CARE EDUCATION/TRAINING PROGRAM

## 2023-01-01 PROCEDURE — 71046 X-RAY EXAM CHEST 2 VIEWS: CPT | Mod: 26,,, | Performed by: RADIOLOGY

## 2023-01-01 PROCEDURE — 83921 ORGANIC ACID SINGLE QUANT: CPT | Performed by: STUDENT IN AN ORGANIZED HEALTH CARE EDUCATION/TRAINING PROGRAM

## 2023-01-01 PROCEDURE — 87086 URINE CULTURE/COLONY COUNT: CPT | Performed by: NURSE PRACTITIONER

## 2023-01-01 PROCEDURE — 99285 PR EMERGENCY DEPT VISIT,LEVEL V: ICD-10-PCS | Mod: ,,, | Performed by: STUDENT IN AN ORGANIZED HEALTH CARE EDUCATION/TRAINING PROGRAM

## 2023-01-01 RX ORDER — SODIUM CHLORIDE 0.9 % (FLUSH) 0.9 %
10 SYRINGE (ML) INJECTION
Status: DISCONTINUED | OUTPATIENT
Start: 2023-01-01 | End: 2023-01-01 | Stop reason: HOSPADM

## 2023-01-01 RX ORDER — PREDNISONE 20 MG/1
20 TABLET ORAL DAILY
Status: COMPLETED | OUTPATIENT
Start: 2023-01-01 | End: 2023-01-01

## 2023-01-01 RX ORDER — HEPARIN SODIUM 5000 [USP'U]/ML
5000 INJECTION, SOLUTION INTRAVENOUS; SUBCUTANEOUS EVERY 8 HOURS
Status: DISCONTINUED | OUTPATIENT
Start: 2023-01-01 | End: 2023-01-01

## 2023-01-01 RX ORDER — SULFAMETHOXAZOLE AND TRIMETHOPRIM 800; 160 MG/1; MG/1
1 TABLET ORAL
Status: DISCONTINUED | OUTPATIENT
Start: 2023-01-01 | End: 2023-01-01 | Stop reason: HOSPADM

## 2023-01-01 RX ORDER — GABAPENTIN 300 MG/1
300 CAPSULE ORAL NIGHTLY PRN
Qty: 90 CAPSULE | Refills: 2
Start: 2023-01-01 | End: 2023-01-01

## 2023-01-01 RX ORDER — AMOXICILLIN 250 MG
1 CAPSULE ORAL DAILY
Status: DISCONTINUED | OUTPATIENT
Start: 2023-01-01 | End: 2023-01-01 | Stop reason: HOSPADM

## 2023-01-01 RX ORDER — CHOLECALCIFEROL (VITAMIN D3) 125 MCG
5000 CAPSULE ORAL DAILY
Qty: 90 CAPSULE | Refills: 3 | Status: SHIPPED | OUTPATIENT
Start: 2023-01-01

## 2023-01-01 RX ORDER — GABAPENTIN 100 MG/1
100 CAPSULE ORAL 3 TIMES DAILY
Status: DISCONTINUED | OUTPATIENT
Start: 2023-01-01 | End: 2023-01-01 | Stop reason: HOSPADM

## 2023-01-01 RX ORDER — LANOLIN ALCOHOL/MO/W.PET/CERES
400 CREAM (GRAM) TOPICAL DAILY
Status: DISCONTINUED | OUTPATIENT
Start: 2023-01-01 | End: 2023-01-01 | Stop reason: HOSPADM

## 2023-01-01 RX ORDER — BACLOFEN 10 MG/1
TABLET ORAL
Qty: 180 TABLET | Refills: 3 | Status: ON HOLD | OUTPATIENT
Start: 2023-01-01 | End: 2023-01-01 | Stop reason: SDUPTHER

## 2023-01-01 RX ORDER — ALBUTEROL SULFATE 2.5 MG/.5ML
2.5 SOLUTION RESPIRATORY (INHALATION) EVERY 6 HOURS PRN
Status: DISCONTINUED | OUTPATIENT
Start: 2023-01-01 | End: 2023-01-01 | Stop reason: HOSPADM

## 2023-01-01 RX ORDER — GABAPENTIN 100 MG/1
100 CAPSULE ORAL 2 TIMES DAILY
Qty: 60 CAPSULE | Refills: 6 | Status: ON HOLD | OUTPATIENT
Start: 2023-01-01 | End: 2023-01-01 | Stop reason: SDUPTHER

## 2023-01-01 RX ORDER — IPRATROPIUM BROMIDE AND ALBUTEROL SULFATE 2.5; .5 MG/3ML; MG/3ML
3 SOLUTION RESPIRATORY (INHALATION) 2 TIMES DAILY
Status: DISCONTINUED | OUTPATIENT
Start: 2023-01-01 | End: 2023-01-01 | Stop reason: HOSPADM

## 2023-01-01 RX ORDER — MONTELUKAST SODIUM 10 MG/1
10 TABLET ORAL DAILY
Qty: 30 TABLET | Refills: 2 | Status: SHIPPED | OUTPATIENT
Start: 2023-01-01 | End: 2023-01-01

## 2023-01-01 RX ORDER — ATORVASTATIN CALCIUM 40 MG/1
40 TABLET, FILM COATED ORAL NIGHTLY
Qty: 90 TABLET | Refills: 3 | Status: SHIPPED | OUTPATIENT
Start: 2023-01-01

## 2023-01-01 RX ORDER — LIDOCAINE 50 MG/G
1 PATCH TOPICAL DAILY
Status: DISCONTINUED | OUTPATIENT
Start: 2023-01-01 | End: 2023-01-01 | Stop reason: HOSPADM

## 2023-01-01 RX ORDER — SODIUM CHLORIDE 9 MG/ML
INJECTION, SOLUTION INTRAVENOUS CONTINUOUS
Status: ACTIVE | OUTPATIENT
Start: 2023-01-01 | End: 2023-01-01

## 2023-01-01 RX ORDER — SIMETHICONE 80 MG
1 TABLET,CHEWABLE ORAL 3 TIMES DAILY PRN
Status: DISCONTINUED | OUTPATIENT
Start: 2023-01-01 | End: 2023-01-01 | Stop reason: HOSPADM

## 2023-01-01 RX ORDER — PREDNISONE 10 MG/1
10 TABLET ORAL DAILY
Status: DISCONTINUED | OUTPATIENT
Start: 2023-01-01 | End: 2023-01-01 | Stop reason: HOSPADM

## 2023-01-01 RX ORDER — LIDOCAINE 50 MG/G
1 PATCH TOPICAL DAILY
Start: 2023-01-01 | End: 2023-01-01 | Stop reason: SDUPTHER

## 2023-01-01 RX ORDER — PROMETHAZINE HYDROCHLORIDE 25 MG/ML
25 INJECTION, SOLUTION INTRAMUSCULAR; INTRAVENOUS
Status: COMPLETED | OUTPATIENT
Start: 2023-01-01 | End: 2023-01-01

## 2023-01-01 RX ORDER — GABAPENTIN 100 MG/1
100 CAPSULE ORAL 3 TIMES DAILY
Qty: 60 CAPSULE | Refills: 6
Start: 2023-01-01 | End: 2024-10-15

## 2023-01-01 RX ORDER — NALOXONE HCL 0.4 MG/ML
0.02 VIAL (ML) INJECTION
Status: DISCONTINUED | OUTPATIENT
Start: 2023-01-01 | End: 2023-01-01 | Stop reason: HOSPADM

## 2023-01-01 RX ORDER — ONDANSETRON 2 MG/ML
4 INJECTION INTRAMUSCULAR; INTRAVENOUS EVERY 8 HOURS PRN
Status: DISCONTINUED | OUTPATIENT
Start: 2023-01-01 | End: 2023-01-01 | Stop reason: HOSPADM

## 2023-01-01 RX ORDER — TRAZODONE HYDROCHLORIDE 50 MG/1
50 TABLET ORAL NIGHTLY PRN
Status: ON HOLD
Start: 2023-01-01 | End: 2023-01-01

## 2023-01-01 RX ORDER — SODIUM CHLORIDE 9 MG/ML
INJECTION, SOLUTION INTRAVENOUS CONTINUOUS
Status: DISCONTINUED | OUTPATIENT
Start: 2023-01-01 | End: 2023-01-01

## 2023-01-01 RX ORDER — ACETAMINOPHEN 500 MG
1000 TABLET ORAL EVERY 8 HOURS PRN
Status: DISCONTINUED | OUTPATIENT
Start: 2023-01-01 | End: 2023-01-01 | Stop reason: HOSPADM

## 2023-01-01 RX ORDER — AMLODIPINE BESYLATE 5 MG/1
5 TABLET ORAL DAILY
Status: DISCONTINUED | OUTPATIENT
Start: 2023-01-01 | End: 2023-01-01

## 2023-01-01 RX ORDER — GABAPENTIN 100 MG/1
100 CAPSULE ORAL 2 TIMES DAILY
Status: DISCONTINUED | OUTPATIENT
Start: 2023-01-01 | End: 2023-01-01

## 2023-01-01 RX ORDER — PREDNISONE 10 MG/1
10 TABLET ORAL DAILY
Status: DISCONTINUED | OUTPATIENT
Start: 2023-01-01 | End: 2023-01-01

## 2023-01-01 RX ORDER — IPRATROPIUM BROMIDE 0.5 MG/2.5ML
0.5 SOLUTION RESPIRATORY (INHALATION) EVERY 6 HOURS PRN
Status: DISCONTINUED | OUTPATIENT
Start: 2023-01-01 | End: 2023-01-01 | Stop reason: HOSPADM

## 2023-01-01 RX ORDER — MAGNESIUM SULFATE HEPTAHYDRATE 40 MG/ML
2 INJECTION, SOLUTION INTRAVENOUS ONCE
Status: COMPLETED | OUTPATIENT
Start: 2023-01-01 | End: 2023-01-01

## 2023-01-01 RX ORDER — FLUTICASONE FUROATE AND VILANTEROL 100; 25 UG/1; UG/1
1 POWDER RESPIRATORY (INHALATION) DAILY
Status: DISCONTINUED | OUTPATIENT
Start: 2023-01-01 | End: 2023-01-01 | Stop reason: HOSPADM

## 2023-01-01 RX ORDER — POLYETHYLENE GLYCOL 3350 17 G/17G
17 POWDER, FOR SOLUTION ORAL DAILY
Status: DISCONTINUED | OUTPATIENT
Start: 2023-01-01 | End: 2023-01-01 | Stop reason: HOSPADM

## 2023-01-01 RX ORDER — IBUPROFEN 200 MG
16 TABLET ORAL
Status: DISCONTINUED | OUTPATIENT
Start: 2023-01-01 | End: 2023-01-01 | Stop reason: HOSPADM

## 2023-01-01 RX ORDER — IPRATROPIUM BROMIDE AND ALBUTEROL SULFATE 2.5; .5 MG/3ML; MG/3ML
3 SOLUTION RESPIRATORY (INHALATION) 2 TIMES DAILY PRN
Qty: 75 ML | Refills: 2 | Status: SHIPPED | OUTPATIENT
Start: 2023-01-01

## 2023-01-01 RX ORDER — VIT C/E/ZN/COPPR/LUTEIN/ZEAXAN 250MG-90MG
1000 CAPSULE ORAL DAILY
Qty: 90 CAPSULE | Refills: 3 | Status: CANCELLED | OUTPATIENT
Start: 2023-01-01

## 2023-01-01 RX ORDER — GLUCAGON 1 MG
1 KIT INJECTION
Status: DISCONTINUED | OUTPATIENT
Start: 2023-01-01 | End: 2023-01-01 | Stop reason: HOSPADM

## 2023-01-01 RX ORDER — TRAZODONE HYDROCHLORIDE 50 MG/1
50 TABLET ORAL NIGHTLY PRN
Status: DISCONTINUED | OUTPATIENT
Start: 2023-01-01 | End: 2023-01-01

## 2023-01-01 RX ORDER — GABAPENTIN 100 MG/1
100 CAPSULE ORAL NIGHTLY PRN
Qty: 180 CAPSULE | Refills: 2
Start: 2023-01-01 | End: 2023-01-01 | Stop reason: DRUGHIGH

## 2023-01-01 RX ORDER — OLANZAPINE 10 MG/2ML
5 INJECTION, POWDER, FOR SOLUTION INTRAMUSCULAR ONCE AS NEEDED
Status: COMPLETED | OUTPATIENT
Start: 2023-01-01 | End: 2023-01-01

## 2023-01-01 RX ORDER — ATORVASTATIN CALCIUM 40 MG/1
40 TABLET, FILM COATED ORAL NIGHTLY
Status: DISCONTINUED | OUTPATIENT
Start: 2023-01-01 | End: 2023-01-01 | Stop reason: HOSPADM

## 2023-01-01 RX ORDER — PREDNISONE 10 MG/1
10 TABLET ORAL DAILY
Qty: 90 TABLET | Refills: 2 | Status: ON HOLD | OUTPATIENT
Start: 2023-01-01 | End: 2023-01-01 | Stop reason: HOSPADM

## 2023-01-01 RX ORDER — PREDNISONE 20 MG/1
20 TABLET ORAL DAILY
Status: DISCONTINUED | OUTPATIENT
Start: 2023-01-01 | End: 2023-01-01 | Stop reason: HOSPADM

## 2023-01-01 RX ORDER — PREDNISONE 20 MG/1
40 TABLET ORAL DAILY
Status: COMPLETED | OUTPATIENT
Start: 2023-01-01 | End: 2023-01-01

## 2023-01-01 RX ORDER — TALC
6 POWDER (GRAM) TOPICAL NIGHTLY PRN
Status: DISCONTINUED | OUTPATIENT
Start: 2023-01-01 | End: 2023-01-01

## 2023-01-01 RX ORDER — SODIUM CHLORIDE 9 MG/ML
1000 INJECTION, SOLUTION INTRAVENOUS
Status: COMPLETED | OUTPATIENT
Start: 2023-01-01 | End: 2023-01-01

## 2023-01-01 RX ORDER — SULFAMETHOXAZOLE AND TRIMETHOPRIM 800; 160 MG/1; MG/1
1 TABLET ORAL
Status: DISCONTINUED | OUTPATIENT
Start: 2023-01-01 | End: 2023-01-01

## 2023-01-01 RX ORDER — IBUPROFEN 200 MG
24 TABLET ORAL
Status: DISCONTINUED | OUTPATIENT
Start: 2023-01-01 | End: 2023-01-01 | Stop reason: HOSPADM

## 2023-01-01 RX ORDER — MAGNESIUM SULFATE 1 G/100ML
1 INJECTION INTRAVENOUS ONCE
Status: COMPLETED | OUTPATIENT
Start: 2023-01-01 | End: 2023-01-01

## 2023-01-01 RX ORDER — GABAPENTIN 100 MG/1
200 CAPSULE ORAL NIGHTLY
Status: DISCONTINUED | OUTPATIENT
Start: 2023-01-01 | End: 2023-01-01

## 2023-01-01 RX ORDER — GABAPENTIN 300 MG/1
300 CAPSULE ORAL NIGHTLY
Qty: 30 CAPSULE | Refills: 6 | Status: ON HOLD | OUTPATIENT
Start: 2023-01-01 | End: 2023-01-01 | Stop reason: HOSPADM

## 2023-01-01 RX ORDER — GABAPENTIN 100 MG/1
100 CAPSULE ORAL 3 TIMES DAILY
Status: DISCONTINUED | OUTPATIENT
Start: 2023-01-01 | End: 2023-01-01

## 2023-01-01 RX ORDER — LIDOCAINE 50 MG/G
1 PATCH TOPICAL DAILY
Qty: 60 PATCH | Refills: 11 | Status: SHIPPED | OUTPATIENT
Start: 2023-01-01

## 2023-01-01 RX ORDER — TALC
6 POWDER (GRAM) TOPICAL NIGHTLY PRN
Status: DISCONTINUED | OUTPATIENT
Start: 2023-01-01 | End: 2023-01-01 | Stop reason: HOSPADM

## 2023-01-01 RX ORDER — PANTOPRAZOLE SODIUM 40 MG/1
40 TABLET, DELAYED RELEASE ORAL DAILY
Status: DISCONTINUED | OUTPATIENT
Start: 2023-01-01 | End: 2023-01-01 | Stop reason: HOSPADM

## 2023-01-01 RX ORDER — MYCOPHENOLATE MOFETIL 250 MG/1
500 CAPSULE ORAL 2 TIMES DAILY
Status: DISCONTINUED | OUTPATIENT
Start: 2023-01-01 | End: 2023-01-01

## 2023-01-01 RX ORDER — QUETIAPINE FUMARATE 25 MG/1
25 TABLET, FILM COATED ORAL NIGHTLY
Qty: 30 TABLET | Refills: 11 | Status: SHIPPED | OUTPATIENT
Start: 2023-01-01 | End: 2024-10-15

## 2023-01-01 RX ORDER — QUETIAPINE FUMARATE 25 MG/1
25 TABLET, FILM COATED ORAL NIGHTLY PRN
Qty: 30 TABLET | Refills: 11 | Status: ON HOLD | OUTPATIENT
Start: 2023-01-01 | End: 2023-01-01 | Stop reason: SDUPTHER

## 2023-01-01 RX ORDER — PREDNISONE 10 MG/1
TABLET ORAL
Qty: 32 TABLET | Refills: 0 | Status: SHIPPED | OUTPATIENT
Start: 2023-01-01 | End: 2023-01-01

## 2023-01-01 RX ORDER — IPRATROPIUM BROMIDE 0.5 MG/2.5ML
0.5 SOLUTION RESPIRATORY (INHALATION) DAILY
Status: DISCONTINUED | OUTPATIENT
Start: 2023-01-01 | End: 2023-01-01 | Stop reason: HOSPADM

## 2023-01-01 RX ORDER — MYCOPHENOLATE MOFETIL 250 MG/1
500 CAPSULE ORAL 2 TIMES DAILY
Status: DISCONTINUED | OUTPATIENT
Start: 2023-01-01 | End: 2023-01-01 | Stop reason: HOSPADM

## 2023-01-01 RX ORDER — SULFAMETHOXAZOLE AND TRIMETHOPRIM 400; 80 MG/1; MG/1
1 TABLET ORAL 2 TIMES DAILY
Status: DISCONTINUED | OUTPATIENT
Start: 2023-01-01 | End: 2023-01-01

## 2023-01-01 RX ORDER — ACETAMINOPHEN 500 MG
1000 TABLET ORAL 3 TIMES DAILY PRN
Status: DISCONTINUED | OUTPATIENT
Start: 2023-01-01 | End: 2023-01-01 | Stop reason: HOSPADM

## 2023-01-01 RX ORDER — BACLOFEN 5 MG/1
5 TABLET ORAL 3 TIMES DAILY
Status: DISCONTINUED | OUTPATIENT
Start: 2023-01-01 | End: 2023-01-01 | Stop reason: HOSPADM

## 2023-01-01 RX ORDER — IPRATROPIUM BROMIDE AND ALBUTEROL SULFATE 2.5; .5 MG/3ML; MG/3ML
3 SOLUTION RESPIRATORY (INHALATION) 2 TIMES DAILY PRN
Status: DISCONTINUED | OUTPATIENT
Start: 2023-01-01 | End: 2023-01-01

## 2023-01-01 RX ORDER — METOPROLOL TARTRATE 1 MG/ML
5 INJECTION, SOLUTION INTRAVENOUS ONCE
Status: COMPLETED | OUTPATIENT
Start: 2023-01-01 | End: 2023-01-01

## 2023-01-01 RX ORDER — HYDROXYCHLOROQUINE SULFATE 200 MG/1
200 TABLET, FILM COATED ORAL 2 TIMES DAILY
Status: DISCONTINUED | OUTPATIENT
Start: 2023-01-01 | End: 2023-01-01 | Stop reason: HOSPADM

## 2023-01-01 RX ORDER — LANOLIN ALCOHOL/MO/W.PET/CERES
1 CREAM (GRAM) TOPICAL EVERY OTHER DAY
Status: DISCONTINUED | OUTPATIENT
Start: 2023-01-01 | End: 2023-01-01 | Stop reason: HOSPADM

## 2023-01-01 RX ORDER — PANTOPRAZOLE SODIUM 40 MG/1
40 TABLET, DELAYED RELEASE ORAL DAILY
Qty: 30 TABLET | Refills: 5 | Status: SHIPPED | OUTPATIENT
Start: 2023-01-01 | End: 2024-01-01

## 2023-01-01 RX ORDER — ACETAMINOPHEN 500 MG
1000 TABLET ORAL 2 TIMES DAILY PRN
Status: ON HOLD
Start: 2023-01-01 | End: 2023-01-01

## 2023-01-01 RX ORDER — SODIUM CHLORIDE 0.9 % (FLUSH) 0.9 %
10 SYRINGE (ML) INJECTION EVERY 12 HOURS PRN
Status: DISCONTINUED | OUTPATIENT
Start: 2023-01-01 | End: 2023-01-01 | Stop reason: HOSPADM

## 2023-01-01 RX ORDER — IPRATROPIUM BROMIDE AND ALBUTEROL SULFATE 2.5; .5 MG/3ML; MG/3ML
3 SOLUTION RESPIRATORY (INHALATION) 2 TIMES DAILY PRN
Status: DISCONTINUED | OUTPATIENT
Start: 2023-01-01 | End: 2023-01-01 | Stop reason: HOSPADM

## 2023-01-01 RX ORDER — LACTULOSE 10 G/15ML
30 SOLUTION ORAL ONCE
Status: COMPLETED | OUTPATIENT
Start: 2023-01-01 | End: 2023-01-01

## 2023-01-01 RX ORDER — SUCRALFATE 1 G/10ML
1 SUSPENSION ORAL ONCE
Status: COMPLETED | OUTPATIENT
Start: 2023-01-01 | End: 2023-01-01

## 2023-01-01 RX ORDER — PANTOPRAZOLE SODIUM 20 MG/1
40 TABLET, DELAYED RELEASE ORAL DAILY
Status: DISCONTINUED | OUTPATIENT
Start: 2023-01-01 | End: 2023-01-01

## 2023-01-01 RX ORDER — AMLODIPINE BESYLATE 5 MG/1
5 TABLET ORAL DAILY
Status: DISCONTINUED | OUTPATIENT
Start: 2023-01-01 | End: 2023-01-01 | Stop reason: HOSPADM

## 2023-01-01 RX ORDER — THIAMINE HCL 100 MG
100 TABLET ORAL DAILY
Status: DISCONTINUED | OUTPATIENT
Start: 2023-01-01 | End: 2023-01-01 | Stop reason: HOSPADM

## 2023-01-01 RX ORDER — LEVALBUTEROL 1.25 MG/.5ML
1.25 SOLUTION, CONCENTRATE RESPIRATORY (INHALATION) DAILY
Status: DISCONTINUED | OUTPATIENT
Start: 2023-01-01 | End: 2023-01-01 | Stop reason: HOSPADM

## 2023-01-01 RX ORDER — AMOXICILLIN 250 MG
1 CAPSULE ORAL 2 TIMES DAILY
Status: DISCONTINUED | OUTPATIENT
Start: 2023-01-01 | End: 2023-01-01 | Stop reason: HOSPADM

## 2023-01-01 RX ORDER — GABAPENTIN 300 MG/1
300 CAPSULE ORAL NIGHTLY
Status: DISCONTINUED | OUTPATIENT
Start: 2023-01-01 | End: 2023-01-01

## 2023-01-01 RX ORDER — TALC
6 POWDER (GRAM) TOPICAL NIGHTLY
Status: DISCONTINUED | OUTPATIENT
Start: 2023-01-01 | End: 2023-01-01 | Stop reason: HOSPADM

## 2023-01-01 RX ORDER — ATORVASTATIN CALCIUM 20 MG/1
40 TABLET, FILM COATED ORAL NIGHTLY
Status: DISCONTINUED | OUTPATIENT
Start: 2023-01-01 | End: 2023-01-01 | Stop reason: HOSPADM

## 2023-01-01 RX ORDER — ONDANSETRON 2 MG/ML
8 INJECTION INTRAMUSCULAR; INTRAVENOUS
Status: DISCONTINUED | OUTPATIENT
Start: 2023-01-01 | End: 2023-01-01

## 2023-01-01 RX ORDER — PREDNISONE 20 MG/1
40 TABLET ORAL DAILY
Status: DISCONTINUED | OUTPATIENT
Start: 2023-01-01 | End: 2023-01-01

## 2023-01-01 RX ORDER — POLYETHYLENE GLYCOL 3350 17 G/17G
17 POWDER, FOR SOLUTION ORAL 2 TIMES DAILY
Status: DISCONTINUED | OUTPATIENT
Start: 2023-01-01 | End: 2023-01-01 | Stop reason: HOSPADM

## 2023-01-01 RX ORDER — FLUTICASONE PROPIONATE AND SALMETEROL 100; 50 UG/1; UG/1
1 POWDER RESPIRATORY (INHALATION) 2 TIMES DAILY
Qty: 1 EACH | Refills: 2 | Status: SHIPPED | OUTPATIENT
Start: 2023-01-01

## 2023-01-01 RX ORDER — BACLOFEN 10 MG/1
10 TABLET ORAL NIGHTLY PRN
Qty: 90 TABLET | Refills: 2
Start: 2023-01-01 | End: 2023-01-01 | Stop reason: SDUPTHER

## 2023-01-01 RX ORDER — SULFAMETHOXAZOLE AND TRIMETHOPRIM 800; 160 MG/1; MG/1
TABLET ORAL
Qty: 36 TABLET | Refills: 3 | Status: ON HOLD | OUTPATIENT
Start: 2023-01-01 | End: 2024-01-01 | Stop reason: HOSPADM

## 2023-01-01 RX ORDER — DEXTROSE 40 %
30 GEL (GRAM) ORAL
Status: DISCONTINUED | OUTPATIENT
Start: 2023-01-01 | End: 2023-01-01 | Stop reason: HOSPADM

## 2023-01-01 RX ORDER — HYDRALAZINE HYDROCHLORIDE 20 MG/ML
10 INJECTION INTRAMUSCULAR; INTRAVENOUS EVERY 6 HOURS PRN
Status: DISCONTINUED | OUTPATIENT
Start: 2023-01-01 | End: 2023-01-01 | Stop reason: HOSPADM

## 2023-01-01 RX ORDER — BACLOFEN 5 MG/1
5 TABLET ORAL 3 TIMES DAILY PRN
Status: DISCONTINUED | OUTPATIENT
Start: 2023-01-01 | End: 2023-01-01 | Stop reason: HOSPADM

## 2023-01-01 RX ORDER — PREDNISONE 5 MG/1
10 TABLET ORAL DAILY
Status: DISCONTINUED | OUTPATIENT
Start: 2023-01-01 | End: 2023-01-01 | Stop reason: HOSPADM

## 2023-01-01 RX ORDER — DEXTROSE 40 %
15 GEL (GRAM) ORAL
Status: DISCONTINUED | OUTPATIENT
Start: 2023-01-01 | End: 2023-01-01 | Stop reason: HOSPADM

## 2023-01-01 RX ORDER — CARVEDILOL 3.12 MG/1
3.12 TABLET ORAL 2 TIMES DAILY WITH MEALS
Status: DISCONTINUED | OUTPATIENT
Start: 2023-01-01 | End: 2023-01-01

## 2023-01-01 RX ORDER — LORAZEPAM 0.5 MG/1
0.5 TABLET ORAL ONCE AS NEEDED
Status: COMPLETED | OUTPATIENT
Start: 2023-01-01 | End: 2023-01-01

## 2023-01-01 RX ORDER — AMLODIPINE BESYLATE 5 MG/1
5 TABLET ORAL DAILY
Start: 2023-01-01 | End: 2023-01-01

## 2023-01-01 RX ORDER — ACETAMINOPHEN 325 MG/1
650 TABLET ORAL EVERY 4 HOURS PRN
Status: DISCONTINUED | OUTPATIENT
Start: 2023-01-01 | End: 2023-01-01 | Stop reason: HOSPADM

## 2023-01-01 RX ORDER — PREDNISONE 10 MG/1
10 TABLET ORAL DAILY
COMMUNITY

## 2023-01-01 RX ORDER — ACETAMINOPHEN 500 MG
1000 TABLET ORAL 2 TIMES DAILY PRN
Status: DISCONTINUED | OUTPATIENT
Start: 2023-01-01 | End: 2023-01-01

## 2023-01-01 RX ORDER — SULFAMETHOXAZOLE AND TRIMETHOPRIM 800; 160 MG/1; MG/1
1 TABLET ORAL 2 TIMES DAILY
Status: DISCONTINUED | OUTPATIENT
Start: 2023-01-01 | End: 2023-01-01

## 2023-01-01 RX ORDER — BACLOFEN 5 MG/1
5 TABLET ORAL 3 TIMES DAILY
Start: 2023-01-01

## 2023-01-01 RX ORDER — ACETAMINOPHEN 325 MG/1
650 TABLET ORAL EVERY 4 HOURS PRN
Status: DISCONTINUED | OUTPATIENT
Start: 2023-01-01 | End: 2023-01-01

## 2023-01-01 RX ORDER — PREDNISONE 10 MG/1
10 TABLET ORAL DAILY
Start: 2023-01-01 | End: 2023-01-01 | Stop reason: SDUPTHER

## 2023-01-01 RX ADMIN — PANTOPRAZOLE SODIUM 40 MG: 40 TABLET, DELAYED RELEASE ORAL at 08:09

## 2023-01-01 RX ADMIN — BACLOFEN 5 MG: 5 TABLET ORAL at 09:10

## 2023-01-01 RX ADMIN — ATORVASTATIN CALCIUM 40 MG: 40 TABLET, FILM COATED ORAL at 08:09

## 2023-01-01 RX ADMIN — FLUTICASONE FUROATE AND VILANTEROL TRIFENATATE 1 PUFF: 100; 25 POWDER RESPIRATORY (INHALATION) at 07:09

## 2023-01-01 RX ADMIN — APIXABAN 2.5 MG: 2.5 TABLET, FILM COATED ORAL at 08:10

## 2023-01-01 RX ADMIN — QUETIAPINE FUMARATE 12.5 MG: 25 TABLET ORAL at 08:09

## 2023-01-01 RX ADMIN — SULFAMETHOXAZOLE AND TRIMETHOPRIM 1 TABLET: 800; 160 TABLET ORAL at 09:10

## 2023-01-01 RX ADMIN — BACLOFEN 5 MG: 5 TABLET ORAL at 08:10

## 2023-01-01 RX ADMIN — LACTULOSE 30 G: 20 SOLUTION ORAL at 10:09

## 2023-01-01 RX ADMIN — SODIUM CHLORIDE: 9 INJECTION, SOLUTION INTRAVENOUS at 12:09

## 2023-01-01 RX ADMIN — GABAPENTIN 100 MG: 100 CAPSULE ORAL at 09:10

## 2023-01-01 RX ADMIN — HYDROXYCHLOROQUINE SULFATE 200 MG: 200 TABLET, FILM COATED ORAL at 09:09

## 2023-01-01 RX ADMIN — Medication 400 MG: at 08:09

## 2023-01-01 RX ADMIN — SULFAMETHOXAZOLE AND TRIMETHOPRIM 1 TABLET: 800; 160 TABLET ORAL at 08:10

## 2023-01-01 RX ADMIN — APIXABAN 5 MG: 5 TABLET, FILM COATED ORAL at 10:10

## 2023-01-01 RX ADMIN — HYDROCORTISONE SODIUM SUCCINATE 50 MG: 100 INJECTION, POWDER, FOR SOLUTION INTRAMUSCULAR; INTRAVENOUS at 12:09

## 2023-01-01 RX ADMIN — PREDNISONE 10 MG: 10 TABLET ORAL at 09:05

## 2023-01-01 RX ADMIN — APIXABAN 2.5 MG: 2.5 TABLET, FILM COATED ORAL at 09:09

## 2023-01-01 RX ADMIN — SENNOSIDES AND DOCUSATE SODIUM 1 TABLET: 50; 8.6 TABLET ORAL at 08:09

## 2023-01-01 RX ADMIN — PREDNISONE 30 MG: 20 TABLET ORAL at 08:09

## 2023-01-01 RX ADMIN — FLUTICASONE FUROATE AND VILANTEROL TRIFENATATE 1 PUFF: 100; 25 POWDER RESPIRATORY (INHALATION) at 08:09

## 2023-01-01 RX ADMIN — QUETIAPINE FUMARATE 12.5 MG: 25 TABLET ORAL at 07:09

## 2023-01-01 RX ADMIN — SIMETHICONE 80 MG: 80 TABLET, CHEWABLE ORAL at 10:10

## 2023-01-01 RX ADMIN — ATORVASTATIN CALCIUM 40 MG: 20 TABLET, FILM COATED ORAL at 08:05

## 2023-01-01 RX ADMIN — APIXABAN 5 MG: 5 TABLET, FILM COATED ORAL at 09:10

## 2023-01-01 RX ADMIN — ATORVASTATIN CALCIUM 40 MG: 40 TABLET, FILM COATED ORAL at 09:10

## 2023-01-01 RX ADMIN — HYDROXYCHLOROQUINE SULFATE 200 MG: 200 TABLET ORAL at 09:05

## 2023-01-01 RX ADMIN — POLYETHYLENE GLYCOL 3350 17 G: 17 POWDER, FOR SOLUTION ORAL at 09:09

## 2023-01-01 RX ADMIN — GABAPENTIN 100 MG: 100 CAPSULE ORAL at 03:10

## 2023-01-01 RX ADMIN — SODIUM CHLORIDE 1000 ML: 9 INJECTION, SOLUTION INTRAVENOUS at 04:08

## 2023-01-01 RX ADMIN — LEVALBUTEROL 1.25 MG: 1.25 SOLUTION, CONCENTRATE RESPIRATORY (INHALATION) at 07:05

## 2023-01-01 RX ADMIN — GABAPENTIN 100 MG: 100 CAPSULE ORAL at 08:10

## 2023-01-01 RX ADMIN — ACETAMINOPHEN 1000 MG: 500 TABLET ORAL at 03:05

## 2023-01-01 RX ADMIN — APIXABAN 2.5 MG: 2.5 TABLET, FILM COATED ORAL at 08:09

## 2023-01-01 RX ADMIN — POLYETHYLENE GLYCOL 3350 17 G: 17 POWDER, FOR SOLUTION ORAL at 08:09

## 2023-01-01 RX ADMIN — POLYETHYLENE GLYCOL 3350 17 G: 17 POWDER, FOR SOLUTION ORAL at 03:05

## 2023-01-01 RX ADMIN — FLUTICASONE FUROATE AND VILANTEROL TRIFENATATE 1 PUFF: 100; 25 POWDER RESPIRATORY (INHALATION) at 07:10

## 2023-01-01 RX ADMIN — ATORVASTATIN CALCIUM 40 MG: 40 TABLET, FILM COATED ORAL at 07:09

## 2023-01-01 RX ADMIN — APIXABAN 2.5 MG: 2.5 TABLET, FILM COATED ORAL at 10:05

## 2023-01-01 RX ADMIN — ATORVASTATIN CALCIUM 40 MG: 40 TABLET, FILM COATED ORAL at 08:10

## 2023-01-01 RX ADMIN — SENNOSIDES AND DOCUSATE SODIUM 1 TABLET: 50; 8.6 TABLET ORAL at 09:09

## 2023-01-01 RX ADMIN — QUETIAPINE FUMARATE 12.5 MG: 25 TABLET ORAL at 10:09

## 2023-01-01 RX ADMIN — TUBERCULIN PURIFIED PROTEIN DERIVATIVE 5 UNITS: 5 INJECTION, SOLUTION INTRADERMAL at 02:10

## 2023-01-01 RX ADMIN — Medication 400 MG: at 09:09

## 2023-01-01 RX ADMIN — Medication 400 MG: at 09:10

## 2023-01-01 RX ADMIN — HYDROXYCHLOROQUINE SULFATE 200 MG: 200 TABLET, FILM COATED ORAL at 08:09

## 2023-01-01 RX ADMIN — GABAPENTIN 100 MG: 100 CAPSULE ORAL at 10:10

## 2023-01-01 RX ADMIN — THIAMINE HCL TAB 100 MG 100 MG: 100 TAB at 09:05

## 2023-01-01 RX ADMIN — SULFAMETHOXAZOLE AND TRIMETHOPRIM 1 TABLET: 800; 160 TABLET ORAL at 08:09

## 2023-01-01 RX ADMIN — GABAPENTIN 300 MG: 300 CAPSULE ORAL at 10:08

## 2023-01-01 RX ADMIN — PANTOPRAZOLE SODIUM 40 MG: 40 TABLET, DELAYED RELEASE ORAL at 09:05

## 2023-01-01 RX ADMIN — SENNOSIDES AND DOCUSATE SODIUM 1 TABLET: 50; 8.6 TABLET ORAL at 10:08

## 2023-01-01 RX ADMIN — HYDROXYCHLOROQUINE SULFATE 200 MG: 200 TABLET, FILM COATED ORAL at 10:10

## 2023-01-01 RX ADMIN — SODIUM CHLORIDE 1000 ML: 9 INJECTION, SOLUTION INTRAVENOUS at 03:10

## 2023-01-01 RX ADMIN — MYCOPHENOLATE MOFETIL 500 MG: 250 CAPSULE ORAL at 01:05

## 2023-01-01 RX ADMIN — IPRATROPIUM BROMIDE AND ALBUTEROL SULFATE 3 ML: .5; 3 SOLUTION RESPIRATORY (INHALATION) at 02:10

## 2023-01-01 RX ADMIN — MYCOPHENOLATE MOFETIL 500 MG: 250 CAPSULE ORAL at 08:05

## 2023-01-01 RX ADMIN — ATORVASTATIN CALCIUM 40 MG: 20 TABLET, FILM COATED ORAL at 09:05

## 2023-01-01 RX ADMIN — PREDNISONE 10 MG: 10 TABLET ORAL at 10:05

## 2023-01-01 RX ADMIN — MAGNESIUM SULFATE HEPTAHYDRATE 1 G: 500 INJECTION, SOLUTION INTRAMUSCULAR; INTRAVENOUS at 03:10

## 2023-01-01 RX ADMIN — PROMETHAZINE HYDROCHLORIDE 25 MG: 25 INJECTION, SOLUTION INTRAMUSCULAR; INTRAVENOUS at 12:05

## 2023-01-01 RX ADMIN — MYCOPHENOLATE MOFETIL 500 MG: 250 CAPSULE ORAL at 09:05

## 2023-01-01 RX ADMIN — VANCOMYCIN HYDROCHLORIDE 1500 MG: 1.5 INJECTION, POWDER, LYOPHILIZED, FOR SOLUTION INTRAVENOUS at 10:10

## 2023-01-01 RX ADMIN — GABAPENTIN 300 MG: 300 CAPSULE ORAL at 08:09

## 2023-01-01 RX ADMIN — BACLOFEN 5 MG: 5 TABLET ORAL at 10:10

## 2023-01-01 RX ADMIN — IPRATROPIUM BROMIDE AND ALBUTEROL SULFATE 3 ML: .5; 3 SOLUTION RESPIRATORY (INHALATION) at 07:10

## 2023-01-01 RX ADMIN — IPRATROPIUM BROMIDE 0.5 MG: 0.5 SOLUTION RESPIRATORY (INHALATION) at 08:05

## 2023-01-01 RX ADMIN — Medication 6 MG: at 08:05

## 2023-01-01 RX ADMIN — Medication 6 MG: at 09:10

## 2023-01-01 RX ADMIN — HYDROXYCHLOROQUINE SULFATE 200 MG: 200 TABLET, FILM COATED ORAL at 08:10

## 2023-01-01 RX ADMIN — ATORVASTATIN CALCIUM 40 MG: 40 TABLET, FILM COATED ORAL at 10:08

## 2023-01-01 RX ADMIN — APIXABAN 2.5 MG: 2.5 TABLET, FILM COATED ORAL at 09:05

## 2023-01-01 RX ADMIN — BACLOFEN 5 MG: 5 TABLET ORAL at 03:10

## 2023-01-01 RX ADMIN — PREDNISONE 10 MG: 10 TABLET ORAL at 08:09

## 2023-01-01 RX ADMIN — IPRATROPIUM BROMIDE 0.5 MG: 0.5 SOLUTION RESPIRATORY (INHALATION) at 09:05

## 2023-01-01 RX ADMIN — GABAPENTIN 100 MG: 100 CAPSULE ORAL at 10:08

## 2023-01-01 RX ADMIN — HYDROXYCHLOROQUINE SULFATE 200 MG: 200 TABLET, FILM COATED ORAL at 09:10

## 2023-01-01 RX ADMIN — APIXABAN 2.5 MG: 2.5 TABLET, FILM COATED ORAL at 09:10

## 2023-01-01 RX ADMIN — HYDROCORTISONE SODIUM SUCCINATE 100 MG: 100 INJECTION, POWDER, FOR SOLUTION INTRAMUSCULAR; INTRAVENOUS at 07:09

## 2023-01-01 RX ADMIN — SULFAMETHOXAZOLE AND TRIMETHOPRIM 1 TABLET: 400; 80 TABLET ORAL at 08:09

## 2023-01-01 RX ADMIN — MAGNESIUM SULFATE HEPTAHYDRATE 1 G: 500 INJECTION, SOLUTION INTRAMUSCULAR; INTRAVENOUS at 04:10

## 2023-01-01 RX ADMIN — THIAMINE HCL TAB 100 MG 100 MG: 100 TAB at 09:10

## 2023-01-01 RX ADMIN — ATORVASTATIN CALCIUM 40 MG: 20 TABLET, FILM COATED ORAL at 10:05

## 2023-01-01 RX ADMIN — VANCOMYCIN HYDROCHLORIDE 1000 MG: 1 INJECTION, POWDER, LYOPHILIZED, FOR SOLUTION INTRAVENOUS at 07:05

## 2023-01-01 RX ADMIN — QUETIAPINE FUMARATE 12.5 MG: 25 TABLET ORAL at 09:09

## 2023-01-01 RX ADMIN — ATORVASTATIN CALCIUM 40 MG: 40 TABLET, FILM COATED ORAL at 10:10

## 2023-01-01 RX ADMIN — ATORVASTATIN CALCIUM 40 MG: 40 TABLET, FILM COATED ORAL at 09:09

## 2023-01-01 RX ADMIN — FERROUS SULFATE TAB 325 MG (65 MG ELEMENTAL FE) 1 EACH: 325 (65 FE) TAB at 09:05

## 2023-01-01 RX ADMIN — Medication 6 MG: at 10:08

## 2023-01-01 RX ADMIN — MAGNESIUM SULFATE HEPTAHYDRATE 2 G: 40 INJECTION, SOLUTION INTRAVENOUS at 01:10

## 2023-01-01 RX ADMIN — MYCOPHENOLATE MOFETIL 500 MG: 250 CAPSULE ORAL at 08:10

## 2023-01-01 RX ADMIN — SULFAMETHOXAZOLE AND TRIMETHOPRIM 1 TABLET: 400; 80 TABLET ORAL at 07:09

## 2023-01-01 RX ADMIN — OLANZAPINE 5 MG: 10 INJECTION, POWDER, FOR SOLUTION INTRAMUSCULAR at 04:09

## 2023-01-01 RX ADMIN — TRAZODONE HYDROCHLORIDE 25 MG: 50 TABLET ORAL at 08:05

## 2023-01-01 RX ADMIN — FLUTICASONE FUROATE AND VILANTEROL TRIFENATATE 1 PUFF: 100; 25 POWDER RESPIRATORY (INHALATION) at 02:10

## 2023-01-01 RX ADMIN — APIXABAN 2.5 MG: 2.5 TABLET, FILM COATED ORAL at 08:05

## 2023-01-01 RX ADMIN — SODIUM CHLORIDE: 9 INJECTION, SOLUTION INTRAVENOUS at 10:09

## 2023-01-01 RX ADMIN — ACETAMINOPHEN 1000 MG: 500 TABLET ORAL at 11:09

## 2023-01-01 RX ADMIN — LORAZEPAM 0.5 MG: 0.5 TABLET ORAL at 08:05

## 2023-01-01 RX ADMIN — PREDNISONE 20 MG: 20 TABLET ORAL at 09:10

## 2023-01-01 RX ADMIN — HYDROCORTISONE SODIUM SUCCINATE 100 MG: 100 INJECTION, POWDER, FOR SOLUTION INTRAMUSCULAR; INTRAVENOUS at 03:09

## 2023-01-01 RX ADMIN — AMLODIPINE BESYLATE 5 MG: 5 TABLET ORAL at 09:05

## 2023-01-01 RX ADMIN — HYDRALAZINE HYDROCHLORIDE 10 MG: 20 INJECTION INTRAMUSCULAR; INTRAVENOUS at 08:09

## 2023-01-01 RX ADMIN — CEFTRIAXONE 1 G: 1 INJECTION, POWDER, FOR SOLUTION INTRAMUSCULAR; INTRAVENOUS at 09:10

## 2023-01-01 RX ADMIN — PREDNISONE 40 MG: 20 TABLET ORAL at 08:09

## 2023-01-01 RX ADMIN — GABAPENTIN 100 MG: 100 CAPSULE ORAL at 08:09

## 2023-01-01 RX ADMIN — HYDROXYCHLOROQUINE SULFATE 200 MG: 200 TABLET, FILM COATED ORAL at 10:08

## 2023-01-01 RX ADMIN — SODIUM CHLORIDE, POTASSIUM CHLORIDE, SODIUM LACTATE AND CALCIUM CHLORIDE 1000 ML: 600; 310; 30; 20 INJECTION, SOLUTION INTRAVENOUS at 01:10

## 2023-01-01 RX ADMIN — METOROPROLOL TARTRATE 5 MG: 5 INJECTION, SOLUTION INTRAVENOUS at 03:10

## 2023-01-01 RX ADMIN — POTASSIUM BICARBONATE 50 MEQ: 978 TABLET, EFFERVESCENT ORAL at 01:10

## 2023-01-01 RX ADMIN — TRAZODONE HYDROCHLORIDE 25 MG: 50 TABLET ORAL at 01:09

## 2023-01-01 RX ADMIN — HYDROXYCHLOROQUINE SULFATE 200 MG: 200 TABLET, FILM COATED ORAL at 07:09

## 2023-01-01 RX ADMIN — PANTOPRAZOLE SODIUM 40 MG: 40 TABLET, DELAYED RELEASE ORAL at 09:09

## 2023-01-01 RX ADMIN — LEVALBUTEROL 1.25 MG: 1.25 SOLUTION, CONCENTRATE RESPIRATORY (INHALATION) at 08:05

## 2023-01-01 RX ADMIN — FLUTICASONE FUROATE AND VILANTEROL TRIFENATATE 1 PUFF: 100; 25 POWDER RESPIRATORY (INHALATION) at 09:09

## 2023-01-01 RX ADMIN — PREDNISONE 10 MG: 5 TABLET ORAL at 09:10

## 2023-01-01 RX ADMIN — SULFAMETHOXAZOLE AND TRIMETHOPRIM 1 TABLET: 400; 80 TABLET ORAL at 09:09

## 2023-01-01 RX ADMIN — TUBERCULIN PURIFIED PROTEIN DERIVATIVE 5 UNITS: 5 INJECTION, SOLUTION INTRADERMAL at 11:09

## 2023-01-01 RX ADMIN — PREDNISONE 30 MG: 5 TABLET ORAL at 09:10

## 2023-01-01 RX ADMIN — FLUTICASONE FUROATE AND VILANTEROL TRIFENATATE 1 PUFF: 100; 25 POWDER RESPIRATORY (INHALATION) at 09:10

## 2023-01-01 RX ADMIN — SUCRALFATE 1 G: 1 SUSPENSION ORAL at 10:10

## 2023-01-01 RX ADMIN — Medication 6 MG: at 09:05

## 2023-01-01 RX ADMIN — HYDROCORTISONE SODIUM SUCCINATE 125 MG: 250 INJECTION, POWDER, FOR SOLUTION INTRAMUSCULAR; INTRAVENOUS at 09:09

## 2023-01-01 RX ADMIN — PREDNISONE 20 MG: 20 TABLET ORAL at 08:09

## 2023-01-01 RX ADMIN — SODIUM CHLORIDE: 9 INJECTION, SOLUTION INTRAVENOUS at 06:09

## 2023-01-01 RX ADMIN — LEVALBUTEROL 1.25 MG: 1.25 SOLUTION, CONCENTRATE RESPIRATORY (INHALATION) at 11:05

## 2023-01-01 RX ADMIN — THIAMINE HCL TAB 100 MG 100 MG: 100 TAB at 08:10

## 2023-01-01 RX ADMIN — GABAPENTIN 100 MG: 100 CAPSULE ORAL at 10:05

## 2023-01-01 RX ADMIN — DOCUSATE SODIUM AND SENNOSIDES 1 TABLET: 8.6; 5 TABLET, FILM COATED ORAL at 03:05

## 2023-01-01 RX ADMIN — HYDROCORTISONE SODIUM SUCCINATE 50 MG: 100 INJECTION, POWDER, FOR SOLUTION INTRAMUSCULAR; INTRAVENOUS at 08:09

## 2023-01-01 RX ADMIN — LIDOCAINE 1 PATCH: 50 PATCH CUTANEOUS at 09:05

## 2023-01-01 RX ADMIN — APIXABAN 2.5 MG: 2.5 TABLET, FILM COATED ORAL at 07:09

## 2023-01-01 RX ADMIN — ACETAMINOPHEN 1000 MG: 500 TABLET ORAL at 08:09

## 2023-01-01 RX ADMIN — FLUTICASONE FUROATE AND VILANTEROL TRIFENATATE 1 PUFF: 100; 25 POWDER RESPIRATORY (INHALATION) at 08:10

## 2023-01-01 RX ADMIN — PREDNISONE 40 MG: 20 TABLET ORAL at 08:10

## 2023-01-01 RX ADMIN — SULFAMETHOXAZOLE AND TRIMETHOPRIM 1 TABLET: 800; 160 TABLET ORAL at 09:05

## 2023-01-01 RX ADMIN — ACETAMINOPHEN 1000 MG: 500 TABLET ORAL at 10:09

## 2023-01-01 RX ADMIN — CEFTRIAXONE 1 G: 1 INJECTION, POWDER, FOR SOLUTION INTRAMUSCULAR; INTRAVENOUS at 10:10

## 2023-01-01 RX ADMIN — HYDROXYCHLOROQUINE SULFATE 200 MG: 200 TABLET ORAL at 08:05

## 2023-01-01 RX ADMIN — HYDROCORTISONE SODIUM SUCCINATE 100 MG: 100 INJECTION, POWDER, FOR SOLUTION INTRAMUSCULAR; INTRAVENOUS at 04:09

## 2023-01-01 RX ADMIN — POLYETHYLENE GLYCOL 3350 17 G: 17 POWDER, FOR SOLUTION ORAL at 11:08

## 2023-01-01 RX ADMIN — ONDANSETRON 4 MG: 2 INJECTION INTRAMUSCULAR; INTRAVENOUS at 10:09

## 2023-01-01 RX ADMIN — VANCOMYCIN HYDROCHLORIDE 750 MG: 750 INJECTION, POWDER, LYOPHILIZED, FOR SOLUTION INTRAVENOUS at 11:10

## 2023-01-01 RX ADMIN — FLUTICASONE FUROATE AND VILANTEROL TRIFENATATE 1 PUFF: 100; 25 POWDER RESPIRATORY (INHALATION) at 10:09

## 2023-01-01 RX ADMIN — PREDNISONE 30 MG: 20 TABLET ORAL at 11:09

## 2023-01-01 RX ADMIN — LEVALBUTEROL 1.25 MG: 1.25 SOLUTION, CONCENTRATE RESPIRATORY (INHALATION) at 09:05

## 2023-01-01 RX ADMIN — HYDROCORTISONE SODIUM SUCCINATE 100 MG: 100 INJECTION, POWDER, FOR SOLUTION INTRAMUSCULAR; INTRAVENOUS at 05:09

## 2023-01-01 RX ADMIN — MAGNESIUM SULFATE HEPTAHYDRATE 1 G: 500 INJECTION, SOLUTION INTRAMUSCULAR; INTRAVENOUS at 10:05

## 2023-01-01 RX ADMIN — HYDROCORTISONE SODIUM SUCCINATE 50 MG: 100 INJECTION, POWDER, FOR SOLUTION INTRAMUSCULAR; INTRAVENOUS at 07:09

## 2023-01-01 RX ADMIN — HYDROCORTISONE SODIUM SUCCINATE 100 MG: 100 INJECTION, POWDER, FOR SOLUTION INTRAMUSCULAR; INTRAVENOUS at 09:09

## 2023-01-01 RX ADMIN — VANCOMYCIN HYDROCHLORIDE 1500 MG: 1.5 INJECTION, POWDER, LYOPHILIZED, FOR SOLUTION INTRAVENOUS at 05:08

## 2023-01-01 RX ADMIN — IPRATROPIUM BROMIDE AND ALBUTEROL SULFATE 3 ML: .5; 3 SOLUTION RESPIRATORY (INHALATION) at 09:05

## 2023-01-01 RX ADMIN — PREDNISONE 40 MG: 20 TABLET ORAL at 09:09

## 2023-01-01 RX ADMIN — MAGNESIUM SULFATE 2 G: 2 INJECTION INTRAVENOUS at 09:05

## 2023-01-01 RX ADMIN — PREDNISONE 20 MG: 20 TABLET ORAL at 08:10

## 2023-01-01 RX ADMIN — Medication 400 MG: at 08:10

## 2023-01-01 RX ADMIN — SODIUM CHLORIDE 125 ML: 9 INJECTION, SOLUTION INTRAVENOUS at 08:09

## 2023-01-01 RX ADMIN — HYDROXYCHLOROQUINE SULFATE 200 MG: 200 TABLET ORAL at 10:05

## 2023-01-01 RX ADMIN — PANTOPRAZOLE SODIUM 40 MG: 20 TABLET, DELAYED RELEASE ORAL at 08:10

## 2023-01-01 RX ADMIN — TRAZODONE HYDROCHLORIDE 25 MG: 50 TABLET ORAL at 09:05

## 2023-01-01 RX ADMIN — IPRATROPIUM BROMIDE AND ALBUTEROL SULFATE 3 ML: .5; 3 SOLUTION RESPIRATORY (INHALATION) at 08:10

## 2023-01-01 RX ADMIN — APIXABAN 2.5 MG: 2.5 TABLET, FILM COATED ORAL at 10:10

## 2023-01-01 RX ADMIN — APIXABAN 2.5 MG: 2.5 TABLET, FILM COATED ORAL at 10:08

## 2023-01-01 RX ADMIN — ACETAMINOPHEN 1000 MG: 500 TABLET ORAL at 09:05

## 2023-01-01 RX ADMIN — HYDROCORTISONE SODIUM SUCCINATE 100 MG: 100 INJECTION, POWDER, FOR SOLUTION INTRAMUSCULAR; INTRAVENOUS at 08:10

## 2023-01-01 RX ADMIN — THIAMINE HCL TAB 100 MG 100 MG: 100 TAB at 10:05

## 2023-01-01 RX ADMIN — IPRATROPIUM BROMIDE 0.5 MG: 0.5 SOLUTION RESPIRATORY (INHALATION) at 07:05

## 2023-01-01 RX ADMIN — PIPERACILLIN AND TAZOBACTAM 4.5 G: 4; .5 INJECTION, POWDER, LYOPHILIZED, FOR SOLUTION INTRAVENOUS; PARENTERAL at 04:08

## 2023-01-01 RX ADMIN — FERROUS SULFATE TAB 325 MG (65 MG ELEMENTAL FE) 1 EACH: 325 (65 FE) TAB at 10:05

## 2023-01-01 RX ADMIN — SODIUM CHLORIDE: 9 INJECTION, SOLUTION INTRAVENOUS at 02:10

## 2023-01-01 RX ADMIN — TUBERCULIN PURIFIED PROTEIN DERIVATIVE 5 UNITS: 5 INJECTION, SOLUTION INTRADERMAL at 03:05

## 2023-01-01 RX ADMIN — IPRATROPIUM BROMIDE 0.5 MG: 0.5 SOLUTION RESPIRATORY (INHALATION) at 11:05

## 2023-01-01 RX ADMIN — ONDANSETRON 4 MG: 2 INJECTION INTRAMUSCULAR; INTRAVENOUS at 06:09

## 2023-01-01 RX ADMIN — DEXTROSE MONOHYDRATE 125 ML: 100 INJECTION, SOLUTION INTRAVENOUS at 08:05

## 2023-01-01 RX ADMIN — FLUTICASONE FUROATE AND VILANTEROL TRIFENATATE 1 PUFF: 100; 25 POWDER RESPIRATORY (INHALATION) at 03:09

## 2023-01-05 ENCOUNTER — PATIENT OUTREACH (OUTPATIENT)
Dept: HOME HEALTH SERVICES | Facility: HOSPITAL | Age: 88
End: 2023-01-05
Payer: MEDICARE

## 2023-01-06 ENCOUNTER — EXTERNAL HOME HEALTH (OUTPATIENT)
Dept: HOME HEALTH SERVICES | Facility: HOSPITAL | Age: 88
End: 2023-01-06
Payer: MEDICARE

## 2023-01-09 ENCOUNTER — TELEPHONE (OUTPATIENT)
Dept: INTERNAL MEDICINE | Facility: CLINIC | Age: 88
End: 2023-01-09

## 2023-01-09 NOTE — TELEPHONE ENCOUNTER
----- Message from Amador Macdonald sent at 1/9/2023 12:56 PM CST -----  Contact: [facility] Amber/ [phone]  [email or web address]     Patient is calling for Medical Advice regarding: Coughing up thick green mucus for 3 days    How long has patient had these symptoms: 3 days    Pharmacy name and phone#: [facility] #[id number] [facility] [address] [facility] [address] [facility] Phone:  [phone] Fax:  [phone]    s:

## 2023-01-10 ENCOUNTER — OFFICE VISIT (OUTPATIENT)
Dept: INTERNAL MEDICINE | Facility: CLINIC | Age: 88
End: 2023-01-10
Attending: FAMILY MEDICINE
Payer: MEDICARE

## 2023-01-10 DIAGNOSIS — J43.8 OTHER EMPHYSEMA: ICD-10-CM

## 2023-01-10 DIAGNOSIS — I48.0 AF (PAROXYSMAL ATRIAL FIBRILLATION): ICD-10-CM

## 2023-01-10 DIAGNOSIS — F01.A0 MILD VASCULAR DEMENTIA WITHOUT BEHAVIORAL DISTURBANCE, PSYCHOTIC DISTURBANCE, MOOD DISTURBANCE, OR ANXIETY: ICD-10-CM

## 2023-01-10 DIAGNOSIS — J84.116 CRYPTOGENIC ORGANIZING PNEUMONIA: ICD-10-CM

## 2023-01-10 DIAGNOSIS — I63.311 CEREBRAL INFARCTION DUE TO THROMBOSIS OF RIGHT MIDDLE CEREBRAL ARTERY: ICD-10-CM

## 2023-01-10 DIAGNOSIS — F39 MOOD DISORDER: ICD-10-CM

## 2023-01-10 DIAGNOSIS — E27.3 DRUG-INDUCED ADRENOCORTICAL INSUFFICIENCY: ICD-10-CM

## 2023-01-10 DIAGNOSIS — D84.821 IMMUNOCOMPROMISED STATE DUE TO DRUG THERAPY: ICD-10-CM

## 2023-01-10 DIAGNOSIS — I69.154 HEMIPLEGIA AND HEMIPARESIS FOLLOWING NONTRAUMATIC INTRACEREBRAL HEMORRHAGE AFFECTING LEFT NON-DOMINANT SIDE: ICD-10-CM

## 2023-01-10 DIAGNOSIS — I27.23 PULMONARY HYPERTENSION DUE TO INTERSTITIAL LUNG DISEASE: ICD-10-CM

## 2023-01-10 DIAGNOSIS — J84.9 PULMONARY HYPERTENSION DUE TO INTERSTITIAL LUNG DISEASE: ICD-10-CM

## 2023-01-10 DIAGNOSIS — I67.89 CEREBRAL MICROVASCULAR DISEASE: ICD-10-CM

## 2023-01-10 DIAGNOSIS — R05.9 COUGH, UNSPECIFIED TYPE: Primary | ICD-10-CM

## 2023-01-10 DIAGNOSIS — N18.30 CHRONIC RENAL FAILURE SYNDROME, STAGE 3 (MODERATE): ICD-10-CM

## 2023-01-10 DIAGNOSIS — D69.6 THROMBOCYTOPENIA: ICD-10-CM

## 2023-01-10 DIAGNOSIS — Z79.01 ANTICOAGULANT LONG-TERM USE: ICD-10-CM

## 2023-01-10 DIAGNOSIS — I70.0 AORTIC ATHEROSCLEROSIS: ICD-10-CM

## 2023-01-10 DIAGNOSIS — F01.A0 MILD VASCULAR DEMENTIA, UNSPECIFIED WHETHER BEHAVIORAL, PSYCHOTIC, OR MOOD DISTURBANCE OR ANXIETY: ICD-10-CM

## 2023-01-10 DIAGNOSIS — Z79.899 IMMUNOCOMPROMISED STATE DUE TO DRUG THERAPY: ICD-10-CM

## 2023-01-10 DIAGNOSIS — M05.79 SEROPOSITIVE RHEUMATOID ARTHRITIS OF MULTIPLE SITES: ICD-10-CM

## 2023-01-10 DIAGNOSIS — S06.5XAA SDH (SUBDURAL HEMATOMA): ICD-10-CM

## 2023-01-10 PROBLEM — D72.829 LEUKOCYTOSIS: Status: RESOLVED | Noted: 2021-12-13 | Resolved: 2023-01-10

## 2023-01-10 PROBLEM — M06.9 RA (RHEUMATOID ARTHRITIS): Status: RESOLVED | Noted: 2021-12-13 | Resolved: 2023-01-10

## 2023-01-10 PROBLEM — Z86.73 HISTORY OF STROKE: Status: RESOLVED | Noted: 2017-12-12 | Resolved: 2023-01-10

## 2023-01-10 PROBLEM — Z75.8 DISCHARGE PLANNING ISSUES: Status: RESOLVED | Noted: 2022-06-20 | Resolved: 2023-01-10

## 2023-01-10 PROBLEM — Z86.73 HISTORY OF TRANSIENT ISCHEMIC ATTACK (TIA): Status: RESOLVED | Noted: 2019-10-17 | Resolved: 2023-01-10

## 2023-01-10 PROBLEM — D84.9 IMMUNOSUPPRESSED STATUS: Status: RESOLVED | Noted: 2019-10-17 | Resolved: 2023-01-10

## 2023-01-10 PROBLEM — S06.5X9A TRAUMATIC SUBDURAL HEMORRHAGE WITH LOSS OF CONSCIOUSNESS OF UNSPECIFIED DURATION, INITIAL ENCOUNTER: Status: RESOLVED | Noted: 2023-01-10 | Resolved: 2023-01-10

## 2023-01-10 PROBLEM — J44.1 COPD EXACERBATION: Status: RESOLVED | Noted: 2022-01-23 | Resolved: 2023-01-10

## 2023-01-10 PROBLEM — S06.5X9A TRAUMATIC SUBDURAL HEMORRHAGE WITH LOSS OF CONSCIOUSNESS OF UNSPECIFIED DURATION, INITIAL ENCOUNTER: Status: ACTIVE | Noted: 2023-01-10

## 2023-01-10 PROBLEM — N30.00 ACUTE CYSTITIS WITHOUT HEMATURIA: Status: RESOLVED | Noted: 2020-02-27 | Resolved: 2023-01-10

## 2023-01-10 PROBLEM — N17.9 AKI (ACUTE KIDNEY INJURY): Status: RESOLVED | Noted: 2019-03-13 | Resolved: 2023-01-10

## 2023-01-10 PROBLEM — G93.40 ACUTE ENCEPHALOPATHY: Status: RESOLVED | Noted: 2019-08-27 | Resolved: 2023-01-10

## 2023-01-10 PROBLEM — F01.50 VASCULAR DEMENTIA: Status: ACTIVE | Noted: 2023-01-10

## 2023-01-10 PROBLEM — F01.50 VASCULAR DEMENTIA: Status: RESOLVED | Noted: 2017-12-06 | Resolved: 2023-01-10

## 2023-01-10 PROCEDURE — 99215 OFFICE O/P EST HI 40 MIN: CPT | Mod: 95,,, | Performed by: FAMILY MEDICINE

## 2023-01-10 PROCEDURE — 99215 PR OFFICE/OUTPT VISIT, EST, LEVL V, 40-54 MIN: ICD-10-PCS | Mod: 95,,, | Performed by: FAMILY MEDICINE

## 2023-01-10 NOTE — PROGRESS NOTES
Subjective:       Patient ID: Selma Lux is a 87 y.o. female.    Chief Complaint: No chief complaint on file.    The patient location is:  Home    The chief complaint leading to consultation is:  Initially this was scheduled for face-to-face visit concerning multiple home health orders that were sent to me. This is a continuing pattern when patient is admitted for lengthy, complicated hospitalizations, but does not schedule with PCP for post d/c assessment. Last seen in April for a similar situation related to admission for subdural hematoma and prolonged rehabilitation.    Patient/daughter were unable to log into a recent telemedicine visit.  She had multiple hospital admissions and confusing and complicated history.  Most recent admission was October for encephalopathy (and other), but more pertinent to this visit June 23rd for CVA.  Then transferred to [facility] for rehabilitation.  [facility] notes are not available.  Patient may have 2 separate clinic identities (which were to have been merged int he past).  Previous subdural CVA was documented in my summary note 04/11/2022.  Patient presents with daughter today. Apparently, some home health has been attending them - chart review - ? Cardiology sign off. I reviewed their recent notes (dec) and the neurolgist APRN (nov).    Cough and congestion for approximately 2 or 3 days.  Patient is a poor historian.  She is coughing at the time of the visit.  Does not appear to be in acute respiratory distress but somewhat somnolent.  They deny fever or shortness of breath.  Pulse ox was 96 no other vital signs were available. Unclear about her baseline these days. She has had a decline since [id number] due to cerebrovascular and pulmonary disease progression.      Visit type: audiovisual    Face to Face time with patient:  Greater than 40 minutes  Greater than 80 minutes minutes of total time spent on the encounter, which includes face to face time and non-face to face  time preparing to see the patient (eg, review of tests), Obtaining and/or reviewing separately obtained history, Documenting clinical information in the electronic or other health record, Independently interpreting results (not separately reported) and communicating results to the patient/family/caregiver, or Care coordination (not separately reported).  But mainly extensive chart review and reconciliation of the problem list with multiple redundant entries and incorrect information.    Each patient to whom he or she provides medical services by telemedicine is:  (1) informed of the relationship between the physician and patient and the respective role of any other health care provider with respect to management of the patient; and (2) notified that he or she may decline to receive medical services by telemedicine and may withdraw from such care at any time.    Notes:  Spoke with daughter and patient.  I recommend they go to the emergency room for further evaluation.  She does appear a bit somnolent with a wet cough.  Home health send us some sort of message concerning cough.  Unclear who was signing her home health medications at this time.  I have declined since we did not see her for a face-to-face after her last 2 hospitalizations and do not know the entire picture. She is clearly home bound, but her baseline and capacity to improvement are unlcear.    Piecinng together through her chart today she had a CVA in June with left-sided hemiparesis.  She had prior lacunar CVA in 2017 based on review of record.  That resulted in an abnormal motor deficit that was followed by Neurology in the past.  She is had multiple providers with divergent and inconsistent care throughout. An example was multiple recommendations for anticoagulation dating back to 2017, and complicated at times by GI bleeds.    There are long periods of time when she is home with home health.  Around the time of the 2017 neurologic problems, she had  significant pulmonary problems, ultimately diagnosed as .  The primary care is through Pulmonary and Cardiology currently.  She also sees neurology from time to time. She has seen a psychiatrist in the past, but follow ups are not consistent. She does not appear to have any acute neurologic deficits at this time.  She has a slowly progressive course due to multiple disease factors.  Her other daughter requested home health for physical therapy recently.  Unclear whether she has reached maximum medical improvement or not.  I strongly suspect she will need some sort of intermediate care ongoing.  Last year she saw our home Medical and palliative care APRN.  No notes identified over the past year or so.  Her daughter today agrees for us to re-engage.  Also request clinical social work.  Also need to see her for a face-to-face visit for an exam and evaluation of her mobility and neuro status.    Following our discussion today they stated they may go to the emergency room tomorrow.  Given the uncertainty and prior neurologic, encephalopathic, urinary and pulmonary problems I do recommend they go today for a quick look to determine if she has a significant disease process exacerbation at this time.    Concerning her problem list and HCC diagnoses, there are multiple redundant and incorrect entries.  Significant attempt to reconcile this was made however several entries have corrupted diagnosis and also she may have 2 charts.  Attempt to identify these problems is accurate as possible was made.      Cough  This is a new problem. The current episode started in the past 7 days. The problem has been gradually worsening. The problem occurs every few minutes. The cough is Productive of sputum. Associated symptoms include nasal congestion, postnasal drip and wheezing. Pertinent negatives include no chest pain, chills, fever, headaches, heartburn, hemoptysis, rash, shortness of breath, sweats or weight loss. The symptoms are  aggravated by lying down. She has tried OTC cough suppressant, a beta-agonist inhaler, body position changes, rest and steroid inhaler for the symptoms. The treatment provided mild relief. Her past medical history is significant for COPD and pneumonia.   Review of Systems   Unable to perform ROS: Dementia   Constitutional:  Negative for chills, fever and weight loss.   HENT:  Positive for postnasal drip.    Respiratory:  Positive for cough and wheezing. Negative for hemoptysis and shortness of breath.    Cardiovascular:  Negative for chest pain.   Gastrointestinal:  Negative for heartburn.   Skin:  Negative for rash.   Neurological:  Negative for headaches.     Objective:      Physical Exam  Constitutional:       General: She is not in acute distress.     Appearance: She is well-developed.   Neurological:      Mental Status: She is alert.      Comments: Somewhat somnolent appearing.  The visit started with her coughing an unable to speak on the device.  I think she could not hear us.  She later was able to answer some questions.  Daughter filled in some additional details.  Her speech did appear comprehensible and she did not appear to have a focal neurologic deficit on the small area that was identifiable on an iPad.   Psychiatric:         Speech: Speech normal.         Behavior: Behavior normal.       Assessment:       1. Cough, unspecified type    2. 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 SDH (subdural hematoma)    4. 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    5. Immunocompromised state due to drug therapy    6. Thrombocytopenia    7. Mood disorder    8. Pulmonary hypertension due to interstitial lung disease    9. Cryptogenic organizing pneumonia    10. Drug-induced adrenocortical insufficiency    11. AF (paroxysmal atrial fibrillation)    12. Anticoagulant long-term use    13. Aortic atherosclerosis    14.  Other emphysema    15. Chronic renal failure syndrome, stage 3 (moderate)    16. Cerebral microvascular disease    17. Seropositive rheumatoid arthritis of multiple sites    18. Mild vascular dementia, unspecified whether behavioral, psychotic, or mood disturbance or anxiety    19. Mild vascular dementia without behavioral disturbance, psychotic disturbance, mood disturbance, or anxiety          Plan:     Medication List with Changes/Refills   Current Medications    ACETAMINOPHEN (TYLENOL) 500 MG TABLET    Take 1 tablet (500 mg total) by mouth every 4 (four) hours as needed for Pain.    ALBUTEROL (PROVENTIL) 2.5 MG /3 ML (0.083 %) NEBULIZER SOLUTION        ALBUTEROL-IPRATROPIUM (DUO-NEB) 2.5 MG-0.5 MG/3 ML NEBULIZER SOLUTION    Take 3 mLs by nebulization 2 (two) times daily as needed for Shortness of Breath. Rescue    AMLODIPINE (NORVASC) 10 MG TABLET    TAKE 1 TABLET(10 MG) BY MOUTH EVERY DAY    APIXABAN (ELIQUIS) 2.5 MG TAB    Take 1 tablet (2.5 mg total) by mouth 2 (two) times daily.    ATORVASTATIN (LIPITOR) 40 MG TABLET    TAKE 1 TABLET(40 MG) BY MOUTH EVERY DAY    BACLOFEN (LIORESAL) 10 MG TABLET    TAKE 1/2 TO 1 TABLET(5 TO 10 MG) BY MOUTH THREE TIMES DAILY AS NEEDED    BISACODYL (DULCOLAX) 10 MG SUPP    Place 1 suppository (10 mg total) rectally daily as needed (constipation).    CARVEDILOL (COREG) 3.125 MG TABLET    Take 1 tablet (3.125 mg total) by mouth 2 (two) times daily with meals.    FERROUS SULFATE, DRIED (SLOW FE) 160 MG (50 MG IRON) TBSR    Take 1 tablet (160 mg total) by mouth once daily.    GABAPENTIN (NEURONTIN) 100 MG CAPSULE    Take 1 capsule (100 mg total) by mouth 2 (two) times daily. Take one 100mg capsule twice a day and one 300mg capsule at bedtime    GABAPENTIN (NEURONTIN) 300 MG CAPSULE    Take 1 capsule (300 mg total) by mouth every evening.    GABAPENTIN 5% BACLOFEN 2% AMITRIPTYLINE 2% TOPICAL CREAM    Apply topically 3 (three) times daily.    HYDROCHLOROTHIAZIDE (HYDRODIURIL) 12.5 MG  "TAB    Take 1 tablet (12.5 mg total) by mouth once daily.    HYDROXYCHLOROQUINE (PLAQUENIL) 200 MG TABLET    Take 1 tablet (200 mg total) by mouth 2 (two) times daily.    LIDOCAINE (LIDODERM) 5 %    Place 1 patch onto the skin once daily. Remove & Discard patch within 12 hours or as directed by MD    MAGNESIUM OXIDE (MAG-OX) 400 MG (241.3 MG MAGNESIUM) TABLET    Take 400 mg by mouth once daily.    MONTELUKAST (SINGULAIR) 10 MG TABLET    Take 10 mg by mouth once daily.    MYCOPHENOLATE (CELLCEPT) 500 MG TAB    TAKE 1 TABLET TWICE A DAY    PANTOPRAZOLE (PROTONIX) 40 MG TABLET    Take 40 mg by mouth once daily.    PREDNISONE (DELTASONE) 10 MG TABLET    Take 1 tablet (10 mg total) by mouth once daily.    THIAMINE 100 MG TABLET    Take 100 mg by mouth once daily.    TRAZODONE (DESYREL) 50 MG TABLET    Take 50 mg by mouth every evening.     1. Cough, unspecified type    2. 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 SDH (subdural hematoma)  Overview:  -Circa 12/2021, see clinic note 4/11/2022 for summary, 1/4/2022 d/c summary    -see problem list entry "Intracranial subdural hematoma" (this problem list entry is archived)    >>>>  Last Assessment & Plan: Formatting of this note might be different from the original. Hospitalization late 2021, w/ rehab to follow (no notes available)  --12/13/2021-CT head revealed thin extra-axial hyperdense collection overlying the right cerebral convexity compatible with acute subdural hematoma, neurosurgery was consulted, patient was noted with Keppra 500 mg b.i.d., apixaban " was held  -12/19/2021; MRI brain shows evolving right sided subdural hematoma, punctate focus of restricted diffusion in the right frontal cortex representing a tiny recent core infarct in the  -12-28-CT head was stable, neurosurgery recommended to resume apixaban-12/21/2021-patient placed on continuous P she because of myoclonic flexion movements in left wrist and shoulder, resulting in generalized slowing consistent with moderate encephalopathy with evidence of subcortical/deep midline dysfunction with more prominent focal slowing seen on the right  -patient started on Vimpat, recommended for epilepsy follow-up   -CT head on 01/03/2022 was stable  -continue patient on seizure precautions      4. 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 Immunocompromised state due to drug therapy  Overview:  rheumatoid arthritis and  - managed by rheumatology and pulmonary      6. Thrombocytopenia    7. Mood disorder  Overview:  --9/8/2017 Neuropsychiatry note Dr. Lagos  -2/7/2018, 3/12/2018 Dr. Chu      8. Pulmonary hypertension due to interstitial lung disease  Overview:  --Past echocardiograms during pulmonary exacerbations have shown mildly elevated PA pressures (37 mmHg maximum). Echo  August 2019 showed decreased LVEF at 40% with some evidence of left-sided heart disease.  12/16/2022 EF 65%, DD.    -The clinical picture currently is more suggestive of WHO 2 pulmonary hypertension that is pretty mild.  There may be an element of WHO 3 pulmonary hypertension at times of more deranged gas exchange.    -followed in cardiology and Pulmonary Clinics      9. 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  -followed by Pulmonary Medicine Clinic      10. Drug-induced adrenocortical insufficiency  Overview:  -corticosteroid for  followed by Pulmonary Medicine      11. AF (paroxysmal atrial fibrillation)  Overview:  -followed by Cardiology, on Eliquis  -PAF noted on admission circa 4/2/2018 for respiratory failure (PNA +/- )      12. Anticoagulant long-term use  Overview:  -PAF - on eliquis, managed by cardiology    -patient had been on ASA and Plavix in past. There was confusion concerning the indication for plavix, but this had been recommended in 2018 by cardiology in the setting of L hemichoria thought to be due to lacunar infact in 2017. (2/9/2018 PCP note)  - plavix subsequently stopped (2/5/2020 cariology note, see alson11/20/2019 cardiology note - pat did not want additional AC at that time).   -ASA and brillinta had been recommended by [facility] neurology for vasc dementia - 12/29/2017 note, but brillianta never started.      13. Aortic atherosclerosis  Overview:  -CT Chest Abdomen Pelvis-4/11/2018  "Aorta: Normal in course and caliber, with mild atherosclerotic plaque." On statin      14. Other emphysema  Overview:  - followed by Pulmonary Medicine      15. Chronic renal failure syndrome, stage 3 (moderate)  Comments:  Current EGFR normal will remove diagnosis from problem list    16. Cerebral microvascular disease  Overview:  -6/20/2022 CT head reference, on statin. Neurology evaluation post CVA 11/2/2022, and separate 6/15/2022 for motion disorder  --see neurology note 8/30/2017 concerning previous lacunar ?CVA with choreiform movement disorder, and microvasc dz      17. Seropositive rheumatoid arthritis of multiple sites  Overview:  -Dx 2012 with Dr No, then Kiera TOBARQ since 2012  Prednisone 5 " mg/d since 2012  Humira one dose April 2018 followed by ICU admission for pnemonia and resp failure    -Managed by rheumatology        18. Mild vascular dementia, unspecified whether behavioral, psychotic, or mood disturbance or anxiety  Overview:  -neurology assessment 7/30/2018, 8/18/2020  -9/8/2017 Neuropsychiatry note Dr. Lagos    Orders:  -     Ambulatory referral/consult to Outpatient Case Management  -     Ambulatory referral/consult to [facility]; Future; Expected date: 01/18/2023    19. Mild vascular dementia without behavioral disturbance, psychotic disturbance, mood disturbance, or anxiety  Overview:  -neurology assessment 7/30/2018, 8/18/2020  -9/8/2017 Neuropsychiatry note Dr. Lagos        See meds, orders, follow up, routing and instructions sections of encounter and AVS. Discussed with patient and provided on AVS.    Lab Results   Component Value Date     (L) 01/11/2023    K 3.9 01/11/2023    CL 93 (L) 01/11/2023    BUN 16 01/11/2023    CREATININE 0.9 01/11/2023     (H) 01/11/2023    HGBA1C 5.3 06/20/2022    MG 1.7 10/12/2022    AST 9 (L) 01/11/2023    ALT 8 (L) 01/11/2023    ALBUMIN 3.5 01/11/2023    PROT 6.2 01/11/2023    BILITOT 1.3 (H) 01/11/2023    CHOL 120 06/19/2022    HDL 65 06/19/2022    LDLCALC 45.0 (L) 06/19/2022    TRIG 50 06/19/2022    WBC 10.83 01/11/2023    HGB 9.8 (L) 01/11/2023    HCT 27 (L) 01/11/2023    HCT 32.7 (L) 01/11/2023     (L) 01/11/2023    TSH 0.572 10/11/2022     (H) 10/11/2022    URICACID 5.5 06/08/2021            Lab: -53

## 2023-01-10 NOTE — TELEPHONE ENCOUNTER
Please schedule patient for a virtual telemedicine visit, soon.  This is concerning her cough.  If she is having any significant difficulty breathing, then needs to go to emergency room for evaluation.    We may be able to use the 4 o'clock appointment on Tuesday if it is not booked    We will not be able to sign off on any home health orders because she did not follow-up with me after her last hospitalization.  A face-to-face encounter is required for home health certification.  She was on the schedule a week or 2 ago for a virtual visit but did not log in for the appointment.      Thank you.

## 2023-01-11 ENCOUNTER — HOSPITAL ENCOUNTER (EMERGENCY)
Facility: HOSPITAL | Age: 88
Discharge: HOME | End: 2023-01-11
Attending: STUDENT IN AN ORGANIZED HEALTH CARE EDUCATION/TRAINING PROGRAM
Payer: MEDICARE

## 2023-01-11 VITALS
DIASTOLIC BLOOD PRESSURE: 86 MMHG | OXYGEN SATURATION: 100 % | TEMPERATURE: 98 F | HEIGHT: 64 IN | BODY MASS INDEX: 25.61 KG/M2 | RESPIRATION RATE: 16 BRPM | SYSTOLIC BLOOD PRESSURE: 162 MMHG | WEIGHT: 150 LBS | HEART RATE: 87 BPM

## 2023-01-11 DIAGNOSIS — J06.9 VIRAL URI WITH COUGH: Primary | ICD-10-CM

## 2023-01-11 DIAGNOSIS — R05.9 COUGH: ICD-10-CM

## 2023-01-11 PROBLEM — M06.9 RA (RHEUMATOID ARTHRITIS): Status: RESOLVED | Noted: 2021-12-13 | Resolved: 2023-01-11

## 2023-01-11 PROBLEM — E63.9 POOR NUTRITION: Status: RESOLVED | Noted: 2018-12-23 | Resolved: 2023-01-11

## 2023-01-11 PROBLEM — S06.5XAA INTRACRANIAL SUBDURAL HEMATOMA: Status: RESOLVED | Noted: 2022-01-04 | Resolved: 2023-01-11

## 2023-01-11 LAB
ALBUMIN SERPL BCP-MCNC: 3.5 G/DL (ref 3.5–5.2)
ALP SERPL-CCNC: 65 U/L (ref 55–135)
ALT SERPL W/O P-5'-P-CCNC: 8 U/L (ref 10–44)
ANION GAP SERPL CALC-SCNC: 8 MMOL/L (ref 8–16)
ANISOCYTOSIS BLD QL SMEAR: SLIGHT
AST SERPL-CCNC: 9 U/L (ref 10–40)
BASOPHILS NFR BLD: 0 % (ref 0–1.9)
BILIRUB SERPL-MCNC: 1.3 MG/DL (ref 0.1–1)
BUN SERPL-MCNC: 16 MG/DL (ref 8–23)
BUN SERPL-MCNC: 19 MG/DL (ref 6–30)
BURR CELLS BLD QL SMEAR: ABNORMAL
CALCIUM SERPL-MCNC: 8.6 MG/DL (ref 8.7–10.5)
CHLORIDE SERPL-SCNC: 90 MMOL/L (ref 95–110)
CHLORIDE SERPL-SCNC: 93 MMOL/L (ref 95–110)
CO2 SERPL-SCNC: 27 MMOL/L (ref 23–29)
CREAT SERPL-MCNC: 0.9 MG/DL (ref 0.5–1.4)
CREAT SERPL-MCNC: 0.9 MG/DL (ref 0.5–1.4)
DIFFERENTIAL METHOD: ABNORMAL
EOSINOPHIL NFR BLD: 2 % (ref 0–8)
ERYTHROCYTE [DISTWIDTH] IN BLOOD BY AUTOMATED COUNT: 17.8 % (ref 11.5–14.5)
EST. GFR  (NO RACE VARIABLE): >60 ML/MIN/1.73 M^2
GLUCOSE SERPL-MCNC: 121 MG/DL (ref 70–110)
GLUCOSE SERPL-MCNC: 145 MG/DL (ref 70–110)
HCT VFR BLD AUTO: 32.7 % (ref 37–48.5)
HCT VFR BLD CALC: 27 %PCV (ref 36–54)
HCV AB SERPL QL IA: NORMAL
HGB BLD-MCNC: 9.8 G/DL (ref 12–16)
HIV 1+2 AB+HIV1 P24 AG SERPL QL IA: NORMAL
HYPOCHROMIA BLD QL SMEAR: ABNORMAL
IMM GRANULOCYTES # BLD AUTO: ABNORMAL K/UL (ref 0–0.04)
IMM GRANULOCYTES NFR BLD AUTO: ABNORMAL % (ref 0–0.5)
INFLUENZA A, MOLECULAR: NOT DETECTED
INFLUENZA B, MOLECULAR: NOT DETECTED
LYMPHOCYTES NFR BLD: 26 % (ref 18–48)
MCH RBC QN AUTO: 24.6 PG (ref 27–31)
MCHC RBC AUTO-ENTMCNC: 30 G/DL (ref 32–36)
MCV RBC AUTO: 82 FL (ref 82–98)
MONOCYTES NFR BLD: 13 % (ref 4–15)
NEUTROPHILS NFR BLD: 59 % (ref 38–73)
NRBC BLD-RTO: 0 /100 WBC
PLATELET # BLD AUTO: 119 K/UL (ref 150–450)
PLATELET BLD QL SMEAR: ABNORMAL
PMV BLD AUTO: 10.8 FL (ref 9.2–12.9)
POC IONIZED CALCIUM: 1.18 MMOL/L (ref 1.06–1.42)
POC TCO2 (MEASURED): 31 MMOL/L (ref 23–29)
POIKILOCYTOSIS BLD QL SMEAR: SLIGHT
POLYCHROMASIA BLD QL SMEAR: ABNORMAL
POTASSIUM BLD-SCNC: 4.5 MMOL/L (ref 3.5–5.1)
POTASSIUM SERPL-SCNC: 3.9 MMOL/L (ref 3.5–5.1)
PROT SERPL-MCNC: 6.2 G/DL (ref 6–8.4)
RBC # BLD AUTO: 3.99 M/UL (ref 4–5.4)
RSV AG BY MOLECULAR METHOD: NOT DETECTED
SAMPLE: ABNORMAL
SARS-COV-2 RNA RESP QL NAA+PROBE: NOT DETECTED
SCHISTOCYTES BLD QL SMEAR: ABNORMAL
SODIUM BLD-SCNC: 128 MMOL/L (ref 136–145)
SODIUM SERPL-SCNC: 128 MMOL/L (ref 136–145)
WBC # BLD AUTO: 10.83 K/UL (ref 3.9–12.7)

## 2023-01-11 PROCEDURE — 80053 COMPREHEN METABOLIC PANEL: CPT | Performed by: STUDENT IN AN ORGANIZED HEALTH CARE EDUCATION/TRAINING PROGRAM

## 2023-01-11 PROCEDURE — 99284 PR EMERGENCY DEPT VISIT,LEVEL IV: ICD-10-PCS | Mod: ,,, | Performed by: STUDENT IN AN ORGANIZED HEALTH CARE EDUCATION/TRAINING PROGRAM

## 2023-01-11 PROCEDURE — 99284 EMERGENCY DEPT VISIT MOD MDM: CPT | Mod: ,,, | Performed by: STUDENT IN AN ORGANIZED HEALTH CARE EDUCATION/TRAINING PROGRAM

## 2023-01-11 PROCEDURE — 93010 ELECTROCARDIOGRAM REPORT: CPT | Mod: ,,, | Performed by: INTERNAL MEDICINE

## 2023-01-11 PROCEDURE — 63600175 PHARM REV CODE 636 W HCPCS: Performed by: STUDENT IN AN ORGANIZED HEALTH CARE EDUCATION/TRAINING PROGRAM

## 2023-01-11 PROCEDURE — 87389 HIV-1 AG W/HIV-1&-2 AB AG IA: CPT | Performed by: PHYSICIAN ASSISTANT

## 2023-01-11 PROCEDURE — 25000003 PHARM REV CODE 250: Performed by: STUDENT IN AN ORGANIZED HEALTH CARE EDUCATION/TRAINING PROGRAM

## 2023-01-11 PROCEDURE — 86803 HEPATITIS C AB TEST: CPT | Performed by: PHYSICIAN ASSISTANT

## 2023-01-11 PROCEDURE — 93005 ELECTROCARDIOGRAM TRACING: CPT

## 2023-01-11 PROCEDURE — 85007 BL SMEAR W/DIFF WBC COUNT: CPT | Performed by: STUDENT IN AN ORGANIZED HEALTH CARE EDUCATION/TRAINING PROGRAM

## 2023-01-11 PROCEDURE — 96360 HYDRATION IV INFUSION INIT: CPT

## 2023-01-11 PROCEDURE — 99285 EMERGENCY DEPT VISIT HI MDM: CPT | Mod: 25

## 2023-01-11 PROCEDURE — 80047 BASIC METABLC PNL IONIZED CA: CPT | Mod: 59

## 2023-01-11 PROCEDURE — 85027 COMPLETE CBC AUTOMATED: CPT | Performed by: STUDENT IN AN ORGANIZED HEALTH CARE EDUCATION/TRAINING PROGRAM

## 2023-01-11 PROCEDURE — 93010 EKG 12-LEAD: ICD-10-PCS | Mod: ,,, | Performed by: INTERNAL MEDICINE

## 2023-01-11 PROCEDURE — 0241U SARS-COV2 (COVID) WITH FLU/RSV BY PCR: CPT | Performed by: PHYSICIAN ASSISTANT

## 2023-01-11 RX ORDER — DEXAMETHASONE 4 MG/1
8 TABLET ORAL ONCE
Status: COMPLETED | OUTPATIENT
Start: 2023-01-11 | End: 2023-01-11

## 2023-01-11 RX ORDER — BENZONATATE 100 MG/1
100 CAPSULE ORAL 3 TIMES DAILY PRN
Qty: 15 CAPSULE | Refills: 0 | Status: SHIPPED | OUTPATIENT
Start: 2023-01-11 | End: 2023-01-01

## 2023-01-11 RX ORDER — DEXAMETHASONE 4 MG/1
8 TABLET ORAL EVERY 12 HOURS
Status: DISCONTINUED | OUTPATIENT
Start: 2023-01-11 | End: 2023-01-11

## 2023-01-11 RX ORDER — AMOXICILLIN AND CLAVULANATE POTASSIUM 875; 125 MG/1; MG/1
1 TABLET, FILM COATED ORAL 2 TIMES DAILY
Qty: 14 TABLET | Refills: 0 | Status: SHIPPED | OUTPATIENT
Start: 2023-01-11 | End: 2023-01-18

## 2023-01-11 RX ADMIN — DEXAMETHASONE 8 MG: 4 TABLET ORAL at 05:01

## 2023-01-11 RX ADMIN — SODIUM CHLORIDE 500 ML: 9 INJECTION, SOLUTION INTRAVENOUS at 02:01

## 2023-01-11 NOTE — ED NOTES
I-STAT Chem-8+ Results:   Value Reference Range   Sodium 128 136-145 mmol/L   Potassium  4.5 3.5-5.1 mmol/L   Chloride 90  mmol/L   Ionized Calcium 1.18 1.06-1.42 mmol/L   CO2 (measured) 31 23-29 mmol/L   Glucose 145  mg/dL   BUN 19 6-30 mg/dL   Creatinine 0.9 0.5-1.4 mg/dL   Hematocrit 27 36-54%

## 2023-01-11 NOTE — ED PROVIDER NOTES
Encounter Date: 1/11/2023       History     Chief Complaint   Patient presents with    Cough     Onset friday     HPI  Patient is an 87-year-old female with history of prior CVA with residual left-sided deficits, atrial fibrillation on anticoagulation, mild vascular dementia who presents for cough and congestion.  History obtained from the patient and her family member at bedside.  Onset of symptoms 6 days ago on Friday.  Patient reports productive cough with clear and yellow sputum.  She also reports a moderate amount of congestion.  She denies fevers, chills, chest pain, shortness breath, abdominal pain, nausea, vomiting, diarrhea, dysuria.  Patient states that she was told to come to the emergency department by her primary care doctor to assess for pneumonia and have viral testing performed.  Besides coughing congestion, patient denies any other medical complaints.    Review of patient's allergies indicates:  No Known Allergies  Past Medical History:   Diagnosis Date    Acute cystitis without hematuria 2/27/2020    Acute hypoxemic respiratory failure 4/11/2018    Acute respiratory failure with hypoxia 3/2/2020    KERMIT (acute kidney injury) 3/13/2019    Altered mental status     Anemia     Arthritis     Bilateral hand pain 3/14/2018    Branch retinal vein occlusion, left eye 2/20/2015    Chronic bilateral low back pain without sciatica 3/23/2017    Chronic renal failure in pediatric patient, stage 3 (moderate) 4/15/2018    Chronic renal failure syndrome, stage 3 (moderate) 4/15/2018    Chronic systolic (congestive) heart failure 8/6/2021    Last Assessment & Plan:  Formatting of this note might be different from the original. 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 Hospitalization late 2021, w/ rehab to follow (no notes available) --12/13/2021-CT head revealed thin extra-axial hyperdense collection overlying the right cerebral convexity compatible with acute subdural hematoma, neurosurgery was consulted, patient was noted with Keppra 500 mg b.i.d., apixaban was held -12/19/    Iron deficiency anemia secondary to inadequate dietary iron intake 6/24/2013    Joint pain     Keratoconjunctivitis sicca of both eyes not specified as Sjogren's 7/29/2016    Leiomyoma of uterus, unspecified 9/16/2014    Long QT interval 6/29/2016    Due to medication (plaquenil)     Macular edema 1/12/2015    Multinodular goiter 1/12/2016    Neuropathy 8/23/2017    Plaquenil causing adverse effect in therapeutic use 10/7/2016    Pneumococcal vaccine refused 8/17/2016    Pneumonia due to Streptococcus pneumoniae 4/5/2018    Poor nutrition 12/23/2018    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/2015    RA (rheumatoid arthritis) 12/13/2021    -managed by rhematology, since this is a duplicate problem list entry, will archive this     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     Past Surgical History:    Procedure Laterality Date    BREAST CYST EXCISION      CATARACT EXTRACTION      COLONOSCOPY N/A 9/29/2015    Procedure: COLONOSCOPY;  Surgeon: FIDELINA Sanchez MD;  Location: [facility] ENDO (4TH FLR);  Service: Endoscopy;  Laterality: N/A;    EYE SURGERY      INJECTION OF ANESTHETIC AGENT AROUND NERVE Left 4/19/2021    Procedure: BLOCK, NERVE, FEMORAL AND OBTURATOR;  Surgeon: Shivam Gonzalez MD;  Location: [facility] PA[facility] MGT;  Service: Pain Management;  Laterality: Left;  consent needed    JOINT REPLACEMENT      right knee    KNEE SURGERY Left 12/31/2013    TKR    left parotidectomy      mixed tumor    IN EVAL,SWALLOW FUNCTION,CINE/VIDEO RECORD  6/5/2021         SALIVARY GLAND SURGERY      TONSILLECTOMY      UPPER GASTROINTESTINAL ENDOSCOPY       Family History   Problem Relation Age of Onset    Hypertension Mother     Heart disease Mother     Hyperthyroidism Mother     Prostate cancer Father         prostate cancer    Cancer Father     Breast cancer Maternal Grandmother     Hyperthyroidism Other     Colon cancer Neg Hx      Social History     Tobacco Use    Smoking status: Never     Passive exposure: Never    Smokeless tobacco: Never   Substance Use Topics    Alcohol use: Yes     Alcohol/week: 0.0 standard drinks     Comment: very seldom     Drug use: No     Review of Systems  Constitutional: No fever, no chills  HENT: No sore throat, + congestion  Eyes: No eye pain, no vision changes  Respiratory: No shortness of breath, + productive cough, no hemoptysis  Cardiovascular: No chest pain, no palpitations  Gastrointestinal: No abdominal pain, no nausea, no vomiting, no diarrhea  Genitourinary: No dysuria, no hematuria  Musculoskeletal: No back pain  Neurological: No headache, no new weakness or numbness/tingling in her extremities  Psychiatric: No agitation     Physical Exam     Initial Vitals [01/11/23 1106]   BP Pulse Resp Temp SpO2   (!) 131/58 78 18 98.2 °F (36.8 °C) 98 %      MAP       --         Physical  Exam  Constitutional: No acute distress, well appearing, intermittently coughing on exam  HENT: Normocephalic, atraumatic, MMM, congestion present  Neck: Supple, normal ROM  Respiratory: Non-labored, lower lung fields clear with good breath sounds, she does have some mild coarseness from upper airway congestion while coughing  Cardiovascular: Well perfused, normal rate, regular rhythm  Gastrointestinal: Soft, non-tender, non-distended  Integumentary: Warm and dry  Musculoskeletal: No deformity  Neurological: Awake and alert, oriented to person place time and situation, sensation light touch intact in all extremities, her left upper and lower extremities are slightly weaker than the right upper and lower extremity but she is still able to raise them against gravity and has good  strength, left-sided weakness is chronic, cranial nerves 2-12 grossly intact  Psychiatric: Cooperative     ED Course   Procedures  Labs Reviewed   CBC W/ AUTO DIFFERENTIAL - Abnormal; Notable for the following components:       Result Value    RBC 3.99 (*)     Hemoglobin 9.8 (*)     Hematocrit 32.7 (*)     MCH 24.6 (*)     MCHC 30.0 (*)     RDW 17.8 (*)     Platelets 119 (*)     Platelet Estimate Decreased (*)     All other components within normal limits   COMPREHENSIVE METABOLIC PANEL - Abnormal; Notable for the following components:    Sodium 128 (*)     Chloride 93 (*)     Glucose 121 (*)     Calcium 8.6 (*)     Total Bilirubin 1.3 (*)     AST 9 (*)     ALT 8 (*)     All other components within normal limits   ISTAT PROCEDURE - Abnormal; Notable for the following components:    POC Glucose 145 (*)     POC Sodium 128 (*)     POC Chloride 90 (*)     POC TCO2 (MEASURED) 31 (*)     POC Hematocrit 27 (*)     All other components within normal limits   HIV 1 / 2 ANTIBODY    Narrative:     Release to patient->Immediate   HEPATITIS C ANTIBODY    Narrative:     Release to patient->Immediate   SARS-COV2 (COVID) WITH FLU/RSV BY PCR   ISTAT CHEM8         ECG Results              EKG 12-lead (Final result)  Result time 01/11/23 14:06:28      Final result by Interface, Lab In [facility] (01/11/23 14:06:28)                   Narrative:    Test Reason : R05.9,    Vent. Rate : 073 BPM     Atrial Rate : 073 BPM     P-R Int : 164 ms          QRS Dur : 088 ms      QT Int : 418 ms       P-R-T Axes : 045 015 064 degrees     QTc Int : 460 ms    Sinus rhythm with Premature supraventricular complexes  Baseline wander  Otherwise normal ECG  When compared with ECG of 20-DEC-2022 15:42,  Premature supraventricular complexes are now Present  Confirmed by Jered Gallagher MD ([id number]) on 1/11/2023 2:06:18 PM    Referred By: [email or web address]   SELF           Confirmed By:Jered Gallagher MD                                  Imaging Results              X-Ray Chest 1 View (Final result)  Result time 01/11/23 13:05:49   Procedure changed from X-Ray Chest PA And Lateral     Final result by Baldomero Fang Jr., MD (01/11/23 13:05:49)                   Impression:      Low lung volumes with bibasilar atelectasis and or minimal pleural fluid.    Chronic widening of the superior mediastinum.      Electronically signed by: Baldomero Saldivar Jr  Date:    01/11/2023  Time:    13:05               Narrative:    EXAMINATION:  XR CHEST 1 VIEW    CLINICAL HISTORY:  sob; Cough, unspecified    TECHNIQUE:  Single frontal view of the chest was performed.    COMPARISON:  10/13/2022 and 01/29/2022    FINDINGS:  Persistent widening of the superior mediastinum with tracheal deviation to the left.  No change from prior.Cardiac silhouette upper normal in size with aortic atherosclerosis.    Very low lung volumes are present with right infrahilar subsegmental atelectasis.  Opacities at both lung bases are likely related to atelectasis with some pleural fluid on the right side difficult to exclude with certainty.                                       Medications   sodium chloride 0.9% bolus 500 mL 500 mL (0 mLs  Intravenous Stopped 1/11/23 1531)   dexAMETHasone tablet 8 mg (8 mg Oral Given 1/11/23 1720)     Medical Decision Making:   History:   I obtained history from: someone other than patient.       <> Summary of History: History obtained from the patient and her family member at bedside  Old Records Summarized: records from clinic visits and records from previous admission(s).  Clinical Tests:   Lab Tests: Ordered and Reviewed  Radiological Study: Ordered and Reviewed  Medical Tests: Ordered and Reviewed  ED Management:  Patient is a very pleasant 87-year-old female who presents for evaluation of cough and congestion.  She has no chest pain or shortness of breath.  Vitals reassuring.  No hypoxia, tachycardia or fever.  Patient is intermittently coughing on exam.  Her lower lung fields are clear with good breath sounds bilaterally but she does have some upper airway coarseness due to congestion but she is able to clear it easily.  I reviewed her primary care note.  Viral swabs, chest x-ray and labs ordered.  Patient has chronic left-sided weakness.  She has no new focal neurologic deficits today and is alert and oriented.  She is hoping for discharge and wants to make sure she does not have a pneumonia as requesting viral swabs.  This was all ordered.  Will continue to monitor the patient and await for these results.  Anticipate discharge home if unremarkable.    On re-evaluation, patient is resting comfortably.  She was monitored in the emergency department without hypoxia or shortness of breath.  She has good breath sounds but still has some coarseness in the upper airway from sputum that she is able to clear with coughing.  Labs reviewed.  Patient is mildly hyponatremic.  She was gently hydrated but does not have any neurologic signs or symptoms.  Her daughter at bedside confirms that the patient is at her baseline and they do not wish to be admitted.  I did offer admission for hydration and observation but they  declined.  She does have history of pneumonia in the past they were concerned that she could be developing a pneumonia.  I did give her a dose of dexamethasone here in the emergency department.  Although I feel that this is likely viral, will cover empirically with Augmentin.  Patient and her daughter would like to be discharged home.  Patient stable at time of discharge.  They were given strict return precautions prior to discharge.  Patient instructed to follow up with her primary care doctor.           ED Course as of 01/11/23 1749   Wed Jan 11, 2023   1250 WBC: 10.83 [NN]   1303 EKGs reviewed.  Patient is intermittently coughing on exam so each 1 has some artifact in different leads.  I was able to review each lead in all of her EKGs and she appears to be in normal sinus rhythm, rate in the low 70s, no STEMI. [NN]   1414 Potassium low at 128 however she had no neurologic symptoms.  Echo reviewed.  Patient has normal EF.  Will give a small normal saline bolus and check an i-STAT after. [NN]   1415 Patient requesting something the eat.  Will provide food. [NN]   1415 Chest x-ray without focal consolidations.  Viral testing negative.  Symptoms consistent with viral URI. [NN]   1654 The patient offered admission for hydration and monitoring of her cough but declined.  She would like to go home.  She tells me that she is eating and drinking well at home and her family corroborates this.  She was gently hydrated here in the emergency department.  Patient feels that she is clearing her sputum and airway well with her what cough.  Her lower lung fields remain clear.  Her family is concerned about her potentially developing pneumonia and requesting steroids and antibiotics.  I feel that this is likely viral and she has no evidence of PNA on CXR however will cover empirically with Augmentin and give a dose of dexamethasone here in the emergency department. [NN]      ED Course User Index  [NN] Marilin Clark MD                  Clinical Impression:   Final diagnoses:  [R05.9] Cough  [J06.9] Viral URI with cough (Primary)        ED Disposition Condition    Discharge Stable          ED Prescriptions       Medication Sig Dispense Start Date End Date Auth. Provider    amoxicillin-clavulanate 875-125mg (AUGMENTIN) 875-125 mg per tablet Take 1 tablet by mouth 2 (two) times daily. for 7 days 14 tablet 1/11/2023 1/18/2023 Marilin Clark MD    benzonatate (TESSALON) 100 MG capsule Take 1 capsule (100 mg total) by mouth 3 (three) times daily as needed for Cough. 15 capsule 1/11/2023 -- Marilin Clark MD          Follow-up Information       Follow up With Specialties Details Why Contact Info    Bhargav Lee MD Family Medicine, Sports Medicine Schedule an appointment as soon as possible for a visit   [address]  [place] [address]  [phone]      [facility] - Emergency Dept Emergency Medicine  As needed, If symptoms worsen [address] [facility] [id number] [phone]             Marilin Clark MD  01/11/23 3069

## 2023-01-11 NOTE — DISCHARGE INSTRUCTIONS
You have been evaluated for cough and congestion today.  You have been treated with steroids here in the emergency department.  You have been prescribed Augmentin which is an antibiotic.  If your symptoms worsen or you develop fever, shortness of breath, chest pain, confusion or any other concerns, you should return to the emergency department for evaluation.

## 2023-01-11 NOTE — ED TRIAGE NOTES
Reports intermittent productive cough since Friday and sneezing. Denies SOB, c/p.     LOC: The patient is awake, alert and aware of environment with an appropriate affect, the patient is oriented x 3 and speaking appropriately.  APPEARANCE: Patient resting comfortably and in no acute distress, patient is clean and well groomed, patient's clothing is properly fastened.  SKIN: The skin is warm and dry, color consistent with ethnicity, patient has normal skin turgor and moist mucus membranes, skin intact, no breakdown or bruising noted.  MUSCULOSKELETAL: Patient moving all extremities spontaneously, no obvious swelling or deformities noted.  RESPIRATORY: Airway is open and patent, respirations are spontaneous, patient has a normal effort and rate, no accessory muscle use noted,   CARDIAC: Patient has a normal rate and regular rhythm, no periphreal edema noted, capillary refill < 3 seconds.  ABDOMEN: Soft and non tender to palpation, no distention noted, normoactive bowel sounds present in all four quadrants.  NEUROLOGIC:  facial expression is symmetrical, patient moving all extremities spontaneously, normal sensation in all extremities when touched with a finger.  Follows all commands appropriately.

## 2023-01-13 ENCOUNTER — DOCUMENT SCAN (OUTPATIENT)
Dept: HOME HEALTH SERVICES | Facility: HOSPITAL | Age: 88
End: 2023-01-13
Payer: MEDICARE

## 2023-01-13 PROBLEM — E44.0 MODERATE PROTEIN-CALORIE MALNUTRITION: Status: RESOLVED | Noted: 2022-02-04 | Resolved: 2023-01-13

## 2023-01-13 PROBLEM — F01.A0 MILD VASCULAR DEMENTIA WITHOUT BEHAVIORAL DISTURBANCE, PSYCHOTIC DISTURBANCE, MOOD DISTURBANCE, OR ANXIETY: Status: ACTIVE | Noted: 2023-01-10

## 2023-01-17 ENCOUNTER — TELEPHONE (OUTPATIENT)
Dept: PRIMARY CARE CLINIC | Facility: CLINIC | Age: 88
End: 2023-01-17

## 2023-01-17 NOTE — TELEPHONE ENCOUNTER
Dr Lee- Your patient has a complex medical profile, advanced age, debility. She also has ACO plan of [employer]. She is appropriate for a referral to [facility].     If you are in agreement with transferring this patient to [facility], could you reach out to family to explain that you recommend the clinic, and describe to them why the services of the clinic would benefit their loved one (care coordination, home visits, mobile NPs, addressing barriers to care)? If they agree, please place [id number]     Thank you,  Génesis Rosario MD/MPH  [facility]  [phone] spectralink     ===View-only below this line===  ----- Message -----  From: Bhargav Lee MD  Sent: 1/12/2023   1:36 PM CST  To: MD Dr. WILFREDO Peters - this is a very complicated patient of mine, who tends to get lost in the system a lot.  Since 2017 she has had a significant and progressive decline.  Her daughters have high expectations for aggressive care.  I spent a significant time reviewing her chart this week.  Clearly, I think she needs home medical care, but also wanted to run her by you for the possibility of [facility].  She is a very sweet doctor.    Let me know which you think, [place]

## 2023-01-18 ENCOUNTER — PES CALL (OUTPATIENT)
Dept: ADMINISTRATIVE | Facility: CLINIC | Age: 88
End: 2023-01-18
Payer: MEDICARE

## 2023-01-24 ENCOUNTER — EXTERNAL HOME HEALTH (OUTPATIENT)
Dept: HOME HEALTH SERVICES | Facility: HOSPITAL | Age: 88
End: 2023-01-24
Payer: MEDICARE

## 2023-01-30 ENCOUNTER — PES CALL (OUTPATIENT)
Dept: ADMINISTRATIVE | Facility: CLINIC | Age: 88
End: 2023-01-30
Payer: MEDICARE

## 2023-01-30 ENCOUNTER — CARE AT HOME (OUTPATIENT)
Dept: HOME HEALTH SERVICES | Facility: CLINIC | Age: 88
End: 2023-01-30
Attending: FAMILY MEDICINE
Payer: MEDICARE

## 2023-01-30 DIAGNOSIS — F01.A0 MILD VASCULAR DEMENTIA, UNSPECIFIED WHETHER BEHAVIORAL, PSYCHOTIC, OR MOOD DISTURBANCE OR ANXIETY: ICD-10-CM

## 2023-01-30 DIAGNOSIS — J84.116 CRYPTOGENIC ORGANIZING PNEUMONIA: Primary | ICD-10-CM

## 2023-01-30 DIAGNOSIS — I63.311 CEREBRAL INFARCTION DUE TO THROMBOSIS OF RIGHT MIDDLE CEREBRAL ARTERY: ICD-10-CM

## 2023-01-30 DIAGNOSIS — I69.154 HEMIPLEGIA AND HEMIPARESIS FOLLOWING NONTRAUMATIC INTRACEREBRAL HEMORRHAGE AFFECTING LEFT NON-DOMINANT SIDE: ICD-10-CM

## 2023-01-30 DIAGNOSIS — R54 AGE-RELATED PHYSICAL DEBILITY: ICD-10-CM

## 2023-01-30 PROCEDURE — 99349 HOME/RES VST EST MOD MDM 40: CPT | Mod: S$GLB,,, | Performed by: NURSE PRACTITIONER

## 2023-01-30 PROCEDURE — 99349 PR HOME VISIT,ESTAB PATIENT,LEVEL III: ICD-10-PCS | Mod: S$GLB,,, | Performed by: NURSE PRACTITIONER

## 2023-01-30 RX ORDER — PREDNISONE 10 MG/1
10 TABLET ORAL DAILY
Qty: 30 TABLET | Refills: 2 | Status: SHIPPED | OUTPATIENT
Start: 2023-01-30 | End: 2023-01-01

## 2023-01-30 RX ORDER — MONTELUKAST SODIUM 10 MG/1
10 TABLET ORAL DAILY
Qty: 30 TABLET | Refills: 2 | Status: SHIPPED | OUTPATIENT
Start: 2023-01-30 | End: 2023-01-01 | Stop reason: SDUPTHER

## 2023-01-30 RX ORDER — MYCOPHENOLATE MOFETIL 500 MG/1
500 TABLET ORAL 2 TIMES DAILY
Qty: 180 TABLET | Refills: 3 | Status: ON HOLD | OUTPATIENT
Start: 2023-01-30 | End: 2024-01-01 | Stop reason: HOSPADM

## 2023-01-30 RX ORDER — TRAZODONE HYDROCHLORIDE 50 MG/1
50 TABLET ORAL NIGHTLY
Qty: 30 TABLET | Refills: 2 | Status: ON HOLD | OUTPATIENT
Start: 2023-01-30 | End: 2023-01-01

## 2023-01-30 RX ORDER — PANTOPRAZOLE SODIUM 40 MG/1
40 TABLET, DELAYED RELEASE ORAL DAILY
Qty: 30 TABLET | Refills: 5 | Status: SHIPPED | OUTPATIENT
Start: 2023-01-30 | End: 2023-01-01 | Stop reason: SDUPTHER

## 2023-01-31 ENCOUNTER — EXTERNAL CHRONIC CARE MANAGEMENT (OUTPATIENT)
Dept: PRIMARY CARE CLINIC | Facility: CLINIC | Age: 88
End: 2023-01-31
Payer: MEDICARE

## 2023-01-31 PROCEDURE — 99490 CHRNC CARE MGMT STAFF 1ST 20: CPT | Mod: S$PBB,,, | Performed by: FAMILY MEDICINE

## 2023-01-31 PROCEDURE — 99490 CHRNC CARE MGMT STAFF 1ST 20: CPT | Mod: PBBFAC | Performed by: FAMILY MEDICINE

## 2023-01-31 PROCEDURE — 99490 PR CHRONIC CARE MGMT, 1ST 20 MIN: ICD-10-PCS | Mod: S$PBB,,, | Performed by: FAMILY MEDICINE

## 2023-02-01 ENCOUNTER — DOCUMENT SCAN (OUTPATIENT)
Dept: HOME HEALTH SERVICES | Facility: HOSPITAL | Age: 88
End: 2023-02-01
Payer: MEDICARE

## 2023-02-01 PROCEDURE — 99499 NO LOS: ICD-10-PCS | Mod: ,,, | Performed by: INTERNAL MEDICINE

## 2023-02-01 PROCEDURE — 99499 UNLISTED E&M SERVICE: CPT | Mod: ,,, | Performed by: INTERNAL MEDICINE

## 2023-02-02 ENCOUNTER — OFFICE VISIT (OUTPATIENT)
Dept: INTERNAL MEDICINE | Facility: CLINIC | Age: 88
End: 2023-02-02
Attending: FAMILY MEDICINE
Payer: MEDICARE

## 2023-02-02 ENCOUNTER — LAB VISIT (OUTPATIENT)
Dept: LAB | Facility: HOSPITAL | Age: 88
End: 2023-02-02
Attending: FAMILY MEDICINE
Payer: MEDICARE

## 2023-02-02 VITALS
DIASTOLIC BLOOD PRESSURE: 86 MMHG | HEIGHT: 64 IN | BODY MASS INDEX: 25.61 KG/M2 | WEIGHT: 150 LBS | SYSTOLIC BLOOD PRESSURE: 120 MMHG

## 2023-02-02 DIAGNOSIS — E87.1 HYPONATREMIA: ICD-10-CM

## 2023-02-02 DIAGNOSIS — I63.311 CEREBRAL INFARCTION DUE TO THROMBOSIS OF RIGHT MIDDLE CEREBRAL ARTERY: Primary | ICD-10-CM

## 2023-02-02 DIAGNOSIS — I48.0 AF (PAROXYSMAL ATRIAL FIBRILLATION): ICD-10-CM

## 2023-02-02 DIAGNOSIS — I10 HYPERTENSION, ESSENTIAL: ICD-10-CM

## 2023-02-02 DIAGNOSIS — J84.116 CRYPTOGENIC ORGANIZING PNEUMONIA: ICD-10-CM

## 2023-02-02 DIAGNOSIS — D69.6 THROMBOCYTOPENIA: ICD-10-CM

## 2023-02-02 DIAGNOSIS — S06.5XAA SDH (SUBDURAL HEMATOMA): ICD-10-CM

## 2023-02-02 DIAGNOSIS — D50.8 IRON DEFICIENCY ANEMIA SECONDARY TO INADEQUATE DIETARY IRON INTAKE: ICD-10-CM

## 2023-02-02 DIAGNOSIS — I67.89 CEREBRAL MICROVASCULAR DISEASE: ICD-10-CM

## 2023-02-02 DIAGNOSIS — I69.154 HEMIPLEGIA AND HEMIPARESIS FOLLOWING NONTRAUMATIC INTRACEREBRAL HEMORRHAGE AFFECTING LEFT NON-DOMINANT SIDE: ICD-10-CM

## 2023-02-02 DIAGNOSIS — Z79.01 ANTICOAGULANT LONG-TERM USE: ICD-10-CM

## 2023-02-02 DIAGNOSIS — M05.79 SEROPOSITIVE RHEUMATOID ARTHRITIS OF MULTIPLE SITES: ICD-10-CM

## 2023-02-02 DIAGNOSIS — E78.2 MIXED HYPERLIPIDEMIA: ICD-10-CM

## 2023-02-02 LAB — SODIUM SERPL-SCNC: 128 MMOL/L (ref 136–145)

## 2023-02-02 PROCEDURE — 99215 PR OFFICE/OUTPT VISIT, EST, LEVL V, 40-54 MIN: ICD-10-PCS | Mod: S$PBB,,, | Performed by: FAMILY MEDICINE

## 2023-02-02 PROCEDURE — 99999 PR PBB SHADOW E&M-EST. PATIENT-LVL III: ICD-10-PCS | Mod: PBBFAC,,, | Performed by: FAMILY MEDICINE

## 2023-02-02 PROCEDURE — 84295 ASSAY OF SERUM SODIUM: CPT | Performed by: FAMILY MEDICINE

## 2023-02-02 PROCEDURE — 99999 PR PBB SHADOW E&M-EST. PATIENT-LVL III: CPT | Mod: PBBFAC,,, | Performed by: FAMILY MEDICINE

## 2023-02-02 PROCEDURE — 36415 COLL VENOUS BLD VENIPUNCTURE: CPT | Performed by: FAMILY MEDICINE

## 2023-02-02 PROCEDURE — 99213 OFFICE O/P EST LOW 20 MIN: CPT | Mod: PBBFAC | Performed by: FAMILY MEDICINE

## 2023-02-02 PROCEDURE — 99215 OFFICE O/P EST HI 40 MIN: CPT | Mod: S$PBB,,, | Performed by: FAMILY MEDICINE

## 2023-02-02 NOTE — PROGRESS NOTES
Subjective:       Patient ID: Selma Lux is a 87 y.o. female.    Chief Complaint: Annual Exam    Established patient follows up for management of chronic medical illnesses with complaints today. Please see dictation and ROS for interval problems, specific complaints and disease management discussion.    Past, Surgical, Family, Social, Histories; Medications, allergies reviewed and reconciled.  Health maintenance file reviewed and addressed items due. Recent applicable lab, imaging and cardiovascular results reviewed.  Problem list items reviewed and modified or added entries (in the overview section) may not be transcribed into this encounter note due to note writer format.      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 She additionally had a stroke in 2017, and then viral encephalitis in 2018.  History is long and complicated, especially over the past 5 years or so with significant deterioration.  Currently living at home with daughter.  Frequent home health.  Frequent hospitalizations including last December through April; June, initially at [facility] then [place] nursing and Rehabilitation - daughter states prednisone was not included in medications there and she ended up in the ICU with pulmonary problems and transferred back to rehabilitation than home.    Daughter states home health physical therapy is currently finished, nurse comes out once a week.  Was seen by virtual visit  approximately 2 weeks ago, she did not look good on the monitor and we refer to the emergency room for further evaluation for cough congestion and possible mental status changes.  Was released.  [facility] did a urine test at an outside institution, daughter brought in that test results today collection 01/30/2023 with 1+ leukocyte and nonspecific culture count GNR less than 10,000. Patient has no mental status changes at this time, fever chills or new urinary, abdominal or pulmonary complaints.    I reviewed her recent cardiology notes.  She had been on Lasix in the past which was discontinued when she had a CVA this summer.  Most recently on December 20th, Dr. Farah placed her back on hydrochlorothiazide.  She had a history of lower extremity swelling.  She is currently on Eliquis seen Neurology 11/02/2022 note.    She had 1 of the Medical and palliative care nurses come to see her recently.  I had discussion with patient and daughter today concerning our [facility] and homecare programs.  They are amenable to this program, which I described as more intensive care, focused on home care with goals of avoiding hospitalization and condition deterioration.      Review of Systems   Constitutional:  Positive for fatigue. Negative for appetite change, chills, diaphoresis and fever.   HENT:  Negative for congestion, postnasal drip, rhinorrhea, sore throat and trouble swallowing.    Eyes:  Negative for visual disturbance.   Respiratory:  Positive for cough. Negative for choking, chest tightness, shortness of breath and wheezing.    Cardiovascular:  Negative for chest pain and leg swelling.   Gastrointestinal:  Negative for abdominal distention, abdominal pain, diarrhea, nausea and vomiting.   Genitourinary:  Negative for difficulty urinating and hematuria.   Musculoskeletal:  Positive for arthralgias. Negative for myalgias.   Skin:  Negative for rash.   Neurological:  Positive for weakness. Negative for  light-headedness and headaches.   Hematological:  Does not bruise/bleed easily.   Psychiatric/Behavioral:  Negative for behavioral problems, decreased concentration and dysphoric mood.      Objective:      Physical Exam  Vitals and nursing note reviewed.   Constitutional:       General: She is not in acute distress.     Appearance: She is well-developed. She is not ill-appearing, toxic-appearing or diaphoretic.      Comments: Elderly appearing female patient, no acute distress.  She is seated comfortably in a wheelchair with no significant peripheral edema.  Over the past 5 years we have noted a steady decline in her ability for ambulation, strength, etc.   HENT:      Head: Normocephalic and atraumatic.   Eyes:      General: No scleral icterus.     Conjunctiva/sclera: Conjunctivae normal.   Cardiovascular:      Rate and Rhythm: Normal rate.      Heart sounds: Heart sounds are distant. No murmur heard.    No friction rub. No gallop.   Pulmonary:      Effort: Pulmonary effort is normal. No respiratory distress.      Breath sounds: Decreased air movement present. Decreased breath sounds present. No wheezing or rales.   Abdominal:      General: There is no distension or abdominal bruit.      Tenderness: There is no abdominal tenderness.   Musculoskeletal:         General: No deformity.      Cervical back: Normal range of motion and neck supple.   Skin:     General: Skin is warm and dry.      Findings: No erythema or rash.   Neurological:      Mental Status: She is alert and oriented to person, place, and time.      GCS: GCS eye subscore is 4. GCS verbal subscore is 5. GCS motor subscore is 6.      Cranial Nerves: No cranial nerve deficit.      Motor: Weakness present. No tremor.      Coordination: Coordination abnormal.      Gait: Gait abnormal.      Comments: Seated in wheelchair,   Psychiatric:         Behavior: Behavior normal.         Thought Content: Thought content normal.         Judgment: Judgment normal.        Assessment:       1. 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    3. Cerebral microvascular disease    4. Anticoagulant long-term use    5. SDH (subdural hematoma)    6. Cryptogenic organizing pneumonia    7. Thrombocytopenia    8. Hypertension, essential    9. Mixed hyperlipidemia    10. AF (paroxysmal atrial fibrillation)    11. Hyponatremia    12. Rheumatoid arthritis of multiple sites    13. Iron deficiency anemia secondary to inadequate dietary iron intake          Plan:     Medication List with Changes/Refills   Current Medications    ACETAMINOPHEN (TYLENOL) 500 MG TABLET    Take 1 tablet (500 mg total) by mouth every 4 (four) hours as needed for Pain.    ALBUTEROL (PROVENTIL) 2.5 MG /3 ML (0.083 %) NEBULIZER SOLUTION        ALBUTEROL-IPRATROPIUM (DUO-NEB) 2.5 MG-0.5 MG/3 ML NEBULIZER SOLUTION    Take 3 mLs by nebulization 2 (two) times daily as needed for Shortness of Breath. Rescue    AMLODIPINE (NORVASC) 10 MG TABLET    TAKE 1 TABLET(10 MG) BY MOUTH EVERY DAY    APIXABAN (ELIQUIS) 2.5 MG TAB    Take 1 tablet (2.5 mg total) by mouth 2 (two) times daily.    ATORVASTATIN (LIPITOR) 40 MG TABLET    TAKE 1 TABLET(40 MG) BY MOUTH EVERY DAY    BACLOFEN (LIORESAL) 10 MG TABLET    TAKE 1/2 TO 1 TABLET(5 TO 10 MG) BY MOUTH THREE TIMES DAILY AS NEEDED    BENZONATATE (TESSALON) 100 MG CAPSULE    Take 1 capsule (100 mg total) by mouth 3 (three) times daily as needed for Cough.    BISACODYL (DULCOLAX) 10 MG SUPP    Place 1 suppository (10 mg total) rectally daily as needed (constipation).    CARVEDILOL (COREG) 3.125 MG TABLET    Take 1 tablet (3.125 mg total) by mouth 2 (two) times daily with meals.    FERROUS SULFATE, DRIED (SLOW FE) 160 MG (50 MG IRON) TBSR    Take 1 tablet (160 mg total) by mouth once daily.    GABAPENTIN (NEURONTIN) 100 MG CAPSULE    Take 1 capsule (100 mg  total) by mouth 2 (two) times daily. Take one 100mg capsule twice a day and one 300mg capsule at bedtime    GABAPENTIN (NEURONTIN) 300 MG CAPSULE    Take 1 capsule (300 mg total) by mouth every evening.    GABAPENTIN 5% BACLOFEN 2% AMITRIPTYLINE 2% TOPICAL CREAM    Apply topically 3 (three) times daily.    HYDROCHLOROTHIAZIDE (HYDRODIURIL) 12.5 MG TAB    Take 1 tablet (12.5 mg total) by mouth once daily.    HYDROXYCHLOROQUINE (PLAQUENIL) 200 MG TABLET    Take 1 tablet (200 mg total) by mouth 2 (two) times daily.    LIDOCAINE (LIDODERM) 5 %    Place 1 patch onto the skin once daily. Remove & Discard patch within 12 hours or as directed by MD    MAGNESIUM OXIDE (MAG-OX) 400 MG (241.3 MG MAGNESIUM) TABLET    Take 400 mg by mouth once daily.    MONTELUKAST (SINGULAIR) 10 MG TABLET    Take 1 tablet (10 mg total) by mouth once daily.    MYCOPHENOLATE (CELLCEPT) 500 MG TAB    Take 1 tablet (500 mg total) by mouth 2 (two) times daily.    PANTOPRAZOLE (PROTONIX) 40 MG TABLET    Take 1 tablet (40 mg total) by mouth once daily.    PREDNISONE (DELTASONE) 10 MG TABLET    Take 1 tablet (10 mg total) by mouth once daily.    THIAMINE 100 MG TABLET    Take 100 mg by mouth once daily.    TRAZODONE (DESYREL) 50 MG TABLET    Take 1 tablet (50 mg total) by mouth every evening.     1. 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  Overview:  -CVA circa June 2022 with extended post stroke rehab at [facility]      3. Cerebral microvascular disease  Overview:  -6/20/2022 CT head reference, on statin. Neurology  "evaluation post CVA 11/2/2022, and separate 6/15/2022 for motion disorder  --see neurology note 8/30/2017 concerning previous lacunar ?CVA with choreiform movement disorder, and microvasc dz    Orders:  -     Ambulatory referral/consult to [facility]; Future; Expected date: 02/09/2023    4. Anticoagulant long-term use  Overview:  -PAF - on eliquis, managed by cardiology  -CVA, see neurology assessment 11/2/2022    -patient had been on ASA and Plavix in past. There was confusion concerning the indication for plavix, but this had been recommended in 2018 by cardiology in the setting of L hemichoria thought to be due to lacunar infact in 2017. (2/9/2018 PCP note)  - plavix subsequently stopped (2/5/2020 cariology note, see alson11/20/2019 cardiology note - pat did not want additional AC at that time).   -ASA and brillinta had been recommended by [facility] neurology for vasc dementia - 12/29/2017 note, but brillianta never started.      5. SDH (subdural hematoma)  Overview:  -Circa 12/13/2021, see clinic note 4/11/2022 for summary, 1/4/2022 d/c summary    -currently off sz meds and last f/u in neurology 11/2/2022    -see problem list entry "Intracranial subdural hematoma" (this problem list entry is archived)    >>>>  Last Assessment & Plan: Formatting of this note might be different from the original. Hospitalization late 2021, w/ rehab to follow (no notes available)  --12/13/2021-CT head revealed thin extra-axial hyperdense collection overlying the right cerebral convexity compatible with acute subdural hematoma, neurosurgery was consulted, patient was noted with Keppra 500 mg b.i.d., apixaban was held  -12/19/2021; MRI brain shows evolving right sided subdural hematoma, punctate focus of restricted diffusion in the right frontal cortex representing a tiny recent core infarct in the  -12-28-CT head was stable, neurosurgery recommended to resume apixaban-12/21/2021-patient placed on continuous P she because of " myoclonic flexion movements in left wrist and shoulder, resulting in generalized slowing consistent with moderate encephalopathy with evidence of subcortical/deep midline dysfunction with more prominent focal slowing seen on the right  -patient started on Vimpat, recommended for epilepsy follow-up   -CT head on 01/03/2022 was stable  -continue patient on seizure precautions      6. 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  -followed by [facility]    -prednisone and cellcept per pulm clinic    Orders:  -     Ambulatory referral/consult to [facility]; Future; Expected date: 02/09/2023    7. Thrombocytopenia  Overview:  -monitor periodically w/ CBC      8. Hypertension, essential    9. Mixed hyperlipidemia    10. AF (paroxysmal atrial fibrillation)  Overview:  -followed by Cardiology, on Eliquis  -PAF noted on admission circa 4/2/2018 for respiratory failure (PNA +/- )      11. Hyponatremia  -     Sodium; Future; Expected date: 02/02/2023    12. Rheumatoid arthritis of multiple sites  Overview:  -Dx 2012 with Dr No, then Kiera  HCQ since 2012  Prednisone 5 mg/d since 2012 previously (doses changed at times due to other conditions, pulmonary)  Humira one dose April 2018 followed by ICU admission for pnemonia and resp failure    -Managed by rheumatology        13. Iron deficiency anemia secondary to inadequate dietary iron intake  Assessment & Plan:  Recent blood count stable, monitor        See meds, orders, follow up, routing and instructions sections of encounter and AVS. Discussed with patient and provided on AVS.    Lab Results   Component Value Date     (L) 01/11/2023    K 3.9 01/11/2023    CL 93 (L) 01/11/2023    BUN 16 01/11/2023    CREATININE 0.9 01/11/2023     (H) 01/11/2023    HGBA1C  5.3 06/20/2022    MG 1.7 10/12/2022    AST 9 (L) 01/11/2023    ALT 8 (L) 01/11/2023    ALBUMIN 3.5 01/11/2023    PROT 6.2 01/11/2023    BILITOT 1.3 (H) 01/11/2023    CHOL 120 06/19/2022    HDL 65 06/19/2022    LDLCALC 45.0 (L) 06/19/2022    TRIG 50 06/19/2022    WBC 10.83 01/11/2023    HGB 9.8 (L) 01/11/2023    HCT 27 (L) 01/11/2023    HCT 32.7 (L) 01/11/2023     (L) 01/11/2023    TSH 0.572 10/11/2022     (H) 10/11/2022    URICACID 5.5 06/08/2021         Today's appointment was >50 minutes including chart review (such as review of clinic notes, consult notes, op notes, ER notes, discharge summaries, labs, imaging, path, cultures, cardiovascular etc.), medication reconciliation and adjustment, and (in some cases) >50% time spent in counseling.

## 2023-02-03 ENCOUNTER — TELEPHONE (OUTPATIENT)
Dept: INTERNAL MEDICINE | Facility: CLINIC | Age: 88
End: 2023-02-03

## 2023-02-03 ENCOUNTER — OUTPATIENT CASE MANAGEMENT (OUTPATIENT)
Dept: ADMINISTRATIVE | Facility: OTHER | Age: 88
End: 2023-02-03
Payer: MEDICARE

## 2023-02-03 DIAGNOSIS — E87.1 HYPONATREMIA: Primary | ICD-10-CM

## 2023-02-03 NOTE — PROGRESS NOTES
Outpatient Care Management  Initial Patient Assessment    Patient: Selma Lux  MRN: [id number]  Date of Service: 02/03/2023  Completed by: Theresa Morgan RN  Referral Date: 01/11/2023  Program: High Risk  Status: Ongoing  Effective Dates: 2/3/2023 - present  Responsible Staff: Theresa Morgan RN        Reason for Visit   Patient presents with    OPCM Chart Review     2/3/2023    OPCM Enrollment Call     2/3/2023    Initial Assessment     2/3/2023    Plan Of Care     2/3/2023       Brief Summary:    Selma Lux was referred by PCP for [facility] for Hemiplegia after CVA. Patient qualifies for program based on Risk Score 82.6%.   Active problem list, medical, surgical and social history reviewed. Active comorbidities include  Afib, Dementia, RA, Pulm HTN, Interstitial Lung Dz, COPD.     Areas of need identified by patient include  help with [place] [employer] and Home Health PT/OT..     Complex care plan created with patient/caregiver input.    Next steps:   CM consulted LMSW, CHW for assistance with [employer] and HH PT/OT.  Will follow up with patient's daughter, Susu, 2/13/2023.

## 2023-02-03 NOTE — LETTER
February 4, 2023    Selma Rosado [address]  [address] [id number]             [facility] [address] [facility] [address] Dear Dr Lux and Susu:    Welcome to [facility]s Complex Care Management Program.  It was a pleasure talking with you today, Susu.  My name is Ladan Morgan RN, [facility]. I look forward to working with you, Dr Lux, as your .  My goal is to help you function at the healthiest and highest level possible.  You can contact me directly at [phone].    As an [facility] patient, some of the services we may be able to provide include:      Development of an individualized care plan with a Registered Nurse    Connection with a    Connection with available resources and services     Coordinate communication among your care team members    Provide coaching and education    Help you understand your doctors treatment plan   Help you obtain information about your insurance coverage.     All services provided by [facility]s Complex Care Managers and other care team members are coordinated with and communicated to your primary care team.    As part of your enrollment, you will be receiving education materials and more information about these services in your [facility] account, by phone or through the mail.  If you do not wish to participate or receive information, please contact our office at [phone].      Sincerely,    Ladan Morgan RN, CCM [facility]   Out-patient RN Complex Care Manager  [phone]

## 2023-02-03 NOTE — TELEPHONE ENCOUNTER
Please call patient/daughter and explain that her sodium level is still quite low.    Discontinue hydrochlorothiazide for now and reduce water intake to no more than a L per day.    Please see orders for repeat and please call patient to schedule  1 week .     I will advise her cardiology team and also place a referral for Nephrology.  Please schedule that as well.    Thank you.

## 2023-02-05 NOTE — TELEPHONE ENCOUNTER
Called patient and daughter's contact numbers, no answer. Left voicemail to return call.    Dr. Lee's message relayed via [employer] message due to inability to reach patient.

## 2023-02-06 VITALS
OXYGEN SATURATION: 98 % | DIASTOLIC BLOOD PRESSURE: 60 MMHG | RESPIRATION RATE: 16 BRPM | SYSTOLIC BLOOD PRESSURE: 108 MMHG | HEART RATE: 70 BPM

## 2023-02-06 PROBLEM — Z86.16 HISTORY OF 2019 NOVEL CORONAVIRUS DISEASE (COVID-19): Status: RESOLVED | Noted: 2022-04-11 | Resolved: 2023-02-06

## 2023-02-06 NOTE — PROGRESS NOTES
[facility]  Medical Home Visit    Visit Date: 23  Encounter Provider: Christopher Plaza  PCP:  Bhargav Hirsch MD    Subjective:      Patient ID: Selma Lux is a 87 y.o. female.    Consult Requested By:  Dr. Bhargav Hirsch  Reason for Consult:    Selma is being seen at home due to being seen at home due to physical debility that presents a taxing effort to leave the home, to mitigate high risk of hospital readmission and/or due to the limited availability of reliable or safe options for transportation to the point of access to the provider. Prior to treatment on this visit the chart was reviewed and patient verbal consent was obtained.    Chief Complaint: [facility]  Pt seen today for history of subdural hematoma 2021, CVA 2022, recent admission for UTI and metabolic encephalopathy, HFrEF (recovered EF), reported COPD (no hx smoking), Cryptogenic organizing pneumonia on chronic prednisone (no biopsy ever done) presumed to be after humira tx, RA dx  on plaquenil and cellcept followed by Dr. 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 She additionally had a stroke in 2017, and then viral encephalitis in 2018.  History is long and complicated, especially over the past 5 years or so with significant deterioration.     TODAY: With this visit today patient is found sitting up at the dining room table. Patient is AAOx3, able to verify her name and . Most of her other history was received from her daughter and caretaker. Pt's two daughters live with her and aide in the home M-F for 6hrs. Pt has become increasingly weaker and daughter states she has not been feeling her normal self the last few days but mother does not want to seek care for her current sx. Her  nurse took a urine sample today to r/o UTI.   Pt has been eating well, denies any  bowel/bladder problems.        VSS. Denies fever, chest pain, shortness of breath, nausea, vomiting, diarrhea.  All hospital discharge orders reviewed and being followed, all medications reconciled and reviewed, patient and family verbalized understanding. No other needs identified at this time.      Review of Systems   Constitutional:  Positive for activity change and fatigue. Negative for appetite change, chills and fever.   Respiratory:  Negative for cough, chest tightness and shortness of breath.    Cardiovascular:  Negative for chest pain and palpitations.   Gastrointestinal:  Negative for abdominal pain, constipation, diarrhea, nausea and vomiting.   Genitourinary:  Negative for decreased urine volume, difficulty urinating, dysuria, flank pain and frequency.   Musculoskeletal:  Positive for arthralgias. Negative for gait problem and myalgias.   Skin:  Negative for color change, rash and wound.   Neurological:  Negative for dizziness, syncope, weakness, light-headedness and headaches.   Hematological:  Does not bruise/bleed easily.   Psychiatric/Behavioral:  Negative for confusion and sleep disturbance. The patient is not nervous/anxious.      Assessments:  Environmental: clean, well kept  Functional Status: mod assistance  Safety: mod fall risk  Nutritional: adequate  Home Health/DME/Supplies: wheelchair    Objective:   Physical Exam  Vitals reviewed.   Constitutional:       General: She is awake. She is not in acute distress.     Appearance: Normal appearance. She is not ill-appearing or toxic-appearing.   HENT:      Head: Atraumatic.   Cardiovascular:      Rate and Rhythm: Normal rate and regular rhythm.      Heart sounds: Normal heart sounds.   Pulmonary:      Effort: Pulmonary effort is normal.      Breath sounds: Normal breath sounds and air entry. No decreased breath sounds.   Abdominal:      General: Abdomen is flat. Bowel sounds are normal. There is no distension.      Palpations: Abdomen is soft.       Tenderness: There is no abdominal tenderness.   Skin:     General: Skin is warm.      Capillary Refill: Capillary refill takes less than 2 seconds.      Findings: No wound.   Neurological:      General: No focal deficit present.      Mental Status: She is alert and oriented to person, place, and time.   Psychiatric:         Attention and Perception: Attention normal.         Mood and Affect: Mood normal.         Speech: Speech normal.         Behavior: Behavior normal. Behavior is cooperative.         Cognition and Memory: Memory is impaired (at times, today A&O x 3).     Vitals:    01/30/23 1340   BP: 108/60   Pulse: 70   Resp: 16   SpO2: 98%   PainSc: 0-No pain     There is no height or weight on file to calculate BMI.    Assessment:     1. Cryptogenic organizing pneumonia    2. 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    3. 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 Mild vascular dementia, unspecified whether behavioral, psychotic, or mood disturbance or anxiety    5. Age-related physical debility        Plan:     Ethical / Legal: Advance Care Planning   Surrogate decision maker:  Name self and daughter Rosy  Code Status:  FULL  [place]:    Other advance directive:  on file, Capacity to make medical decisions:  yes, Conflict-no       1. 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  -[facility]    -prednisone and cellcept per pulm clinic    Orders:  -     mycophenolate (CELLCEPT) 500 mg Tab; Take 1 tablet (500 mg total) by mouth 2 (two) times daily.  Dispense: 180 tablet; Refill: 3    2. Hemiplegia and hemiparesis following nontraumatic intracerebral hemorrhage affecting left non-dominant  side  Overview:  -CVA circa June 2022 with extended post stroke rehab at [facility]    3. 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 Age-related physical debility  Assessment & Plan:  -Pt has private aide M-F in home for 6hrs/day to assist with ADLs, meals, etc..  -Pt has been feeling weak past few days, in the wheelchair or bed mostly.   -Pt has PT and HH.       Other orders  -     predniSONE (DELTASONE) 10 MG tablet; Take 1 tablet (10 mg total) by mouth once daily.  Dispense: 30 tablet; Refill: 2  -     pantoprazole (PROTONIX) 40 MG tablet; Take 1 tablet (40 mg total) by mouth once daily.  Dispense: 30 tablet; Refill: 5  -     traZODone (DESYREL) 50 MG tablet; Take 1 tablet (50 mg total) by mouth every evening. (Patient not taking: Reported on 2/3/2023)  Dispense: 30 tablet; Refill: 2  -     montelukast (SINGULAIR) 10 mg tablet; Take 1 tablet (10 mg total) by mouth once daily.  Dispense: 30 tablet; Refill: 2  -     apixaban (ELIQUIS) 2.5 mg Tab; Take 1 tablet (2.5 mg total) by mouth 2 (two) times daily.  Dispense: 180 tablet; Refill: 3       Total face-to-face time was 30 minutes, >50% of this was spent on counseling and coordination of care. The following issues were discussed: primary and secondary diagnoses, co-morbidities, prescribed medications, treatment modalities, importance of compliance with medical advice, and directives for follow-up care.    Were controlled substances prescribed?  No    Follow Up Appointments:   Future Appointments   Date Time Provider Department Center   4/19/2023 11:00 AM Farida Garcia MD [facility]   8/3/2023  8:20 AM Bhargav Lee MD [facility]     Home NP will f/u with pt in 4-6 wks. Information given to pt if questions, concerns or  status changes to contact me.     Signature: Christopher Plaza NP

## 2023-02-06 NOTE — PATIENT INSTRUCTIONS
Instructions:  - [facility] Nurse Practitioner to schedule home follow-up visit with patient in 4-6 weeks or as needed.  - Continue all medications, treatments and therapies as ordered.   - Follow all instructions, recommendations as discussed.  - Maintain Safety Precautions at all times.  - Attend all medical appointments as scheduled.  - For worsening symptoms: call Primary Care Physician or Nurse Practitioner.  - For emergencies, call 911 or immediately report to the nearest emergency room.

## 2023-02-06 NOTE — ASSESSMENT & PLAN NOTE
-Pt has private aide M-F in home for 6hrs/day to assist with ADLs, meals, etc..  -Pt has been feeling weak past few days, in the wheelchair or bed mostly.   -Pt has PT and HH.

## 2023-02-08 ENCOUNTER — TELEPHONE (OUTPATIENT)
Dept: INTERNAL MEDICINE | Facility: CLINIC | Age: 88
End: 2023-02-08
Payer: MEDICARE

## 2023-02-08 NOTE — TELEPHONE ENCOUNTER
Spoke with pt's daughter she did not let me get a word out. I try to explain the situation to her she no longer wanted to talk to me she wants to talk to Dr. Lee directly. She also wants Dr. Lee to call nephrology and set the appointment for her.

## 2023-02-08 NOTE — TELEPHONE ENCOUNTER
----- Message from Cristopher Hare sent at 2/8/2023  7:41 AM CST -----  Pt daughter would like to speak with Dr Lee concerning getting her mother a sooner appt with     Nephrology  no appt  until Aug in [place]     Please Call    Contact  [phone]

## 2023-02-08 NOTE — TELEPHONE ENCOUNTER
----- Message from Theresa Morgan RN sent at 2/8/2023  4:08 PM CST -----  Regarding: PLEASE CALL DAUGHTER  Dr VILLALOBOS:    Patient's daughter called me stating patient's sodium is 128, patient is getting more and more lethargic, wanting to stay in bed. She called Dr Lee's office, he referred her to Nephrology. There are no appts until August.     Please call Dr Lux's daughter to discuss, patient does not want to go to the hospital.    I believe she is supposed to be a new patient in the 65+ clinic, but, MD is still Rosa. Daughter stated she thinks he is done taking care of this patient. Sad       Thank you,  Ladan Morgan RN, CCM [facility]  [email or web address]  [phone]

## 2023-02-09 NOTE — TELEPHONE ENCOUNTER
Spoke with daughter, appointment for Nephrology was not available until August.  I explained that she needs to be seen much sooner and we will send a message direct for urgent scheduling.  States mom is a bit lethargic, described as sleeping more than typical but still alert.  We will have her sodium rechecked tomorrow as opposed to next week.

## 2023-02-09 NOTE — TELEPHONE ENCOUNTER
Please set patient up for sodium recheck tomorrow, they could potentially get to [facility] lab location.    Sending request to Nephrology for urgent evaluation for hyponatremia.

## 2023-02-16 NOTE — PATIENT INSTRUCTIONS
We will get some labs and urine done with a chest xray  Continue limit fluid intake to 1 liter  Continue to hold the HCTZ    We will see you back in 3 months if there is nothing more urgent that we need to take care of

## 2023-02-16 NOTE — PROGRESS NOTES
Subjective     Chief Complaint: hyponatremia    History of Present Illness:  Ms. Selma Lux is a 87 y.o. female with a history of SDH 12/21, CVA with residual left sided weakness, HFmrEF, COPD, hx of  on pred, RA on plaquenil/cellcept, HTN, vascular dementia, pHTN presents for hyponatremia. 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 They come after PCP found hyponatremia while on HCTZ. She has a history of hyponatremia in the past to a anthony of 118 while [place] [facility]. No urine or serum osm was drawn at that time. Daughter reports that although she has some weakness, she has been more unstable on her feet. She saw her PCP who noted hyponatremia again, her HCTZ was stopped on 2/13/2022 and a liter restriction on fluid was started. Since then, she reports feeling better. She does not have any chest pain, shortness of breath, swelling, nausea or pain.        Review of Systems   Constitutional:  Negative for chills, diaphoresis, fever, malaise/fatigue and weight loss.   Eyes:  Negative for blurred vision.   Respiratory:  Negative for cough, sputum production, shortness of breath and wheezing.    Cardiovascular:  Negative for chest pain, palpitations, claudication and leg swelling.   Gastrointestinal:  Negative for abdominal pain, nausea and vomiting.   Neurological:  Positive for weakness. Negative for dizziness and headaches.     PAST HISTORY:     Past Medical History:   Diagnosis Date    Acute cystitis without hematuria 2/27/2020    Acute hypoxemic respiratory failure 4/11/2018    Acute respiratory failure with hypoxia 3/2/2020    KERMIT (acute kidney injury) 3/13/2019    Altered mental status     Anemia     Arthritis     Bilateral hand pain 3/14/2018    Branch retinal vein occlusion, left eye 2/20/2015    Chronic bilateral low back pain without sciatica 3/23/2017    Chronic renal failure in pediatric patient, stage 3 (moderate) 4/15/2018    Chronic renal failure  syndrome, stage 3 (moderate) 4/15/2018    Chronic systolic (congestive) heart failure 8/6/2021    Last Assessment & Plan:  Formatting of this note might be different from the original. 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 Hospitalization late 2021, w/ rehab to follow (no notes available) --12/13/2021-CT head revealed thin extra-axial hyperdense collection overlying the right cerebral convexity compatible with acute subdural hematoma, neurosurgery was consulted, patient was noted with Keppra 500 mg b.i.d., apixaban was held -12/19/    Iron deficiency anemia secondary to inadequate dietary iron intake 6/24/2013    Joint pain     Keratoconjunctivitis sicca of both eyes not specified as Sjogren's 7/29/2016    Leiomyoma of uterus, unspecified 9/16/2014    Long QT interval 6/29/2016    Due to medication (plaquenil)     Macular edema 1/12/2015    Multinodular goiter 1/12/2016    Neuropathy 8/23/2017    Plaquenil causing adverse effect in therapeutic use 10/7/2016    Pneumococcal vaccine refused 8/17/2016    Pneumonia due to Streptococcus pneumoniae 4/5/2018    Poor nutrition 12/23/2018    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/2015    RA (rheumatoid  arthritis) 12/13/2021    -managed by rhematology, since this is a duplicate problem list entry, will archive this     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       Past Surgical History:   Procedure Laterality Date    BREAST CYST EXCISION      CATARACT EXTRACTION      COLONOSCOPY N/A 9/29/2015    Procedure: COLONOSCOPY;  Surgeon: FIDELINA Sanchez MD;  Location: [facility] ENDO (4TH FLR);  Service: Endoscopy;  Laterality: N/A;    EYE SURGERY      INJECTION OF ANESTHETIC AGENT AROUND NERVE Left 4/19/2021    Procedure: BLOCK, NERVE, FEMORAL AND OBTURATOR;  Surgeon: Shivam Gonzalez MD;  Location: [facility] PAIN MGT;  Service: Pain Management;  Laterality: Left;  consent needed    JOINT REPLACEMENT      right knee    KNEE SURGERY Left 12/31/2013    TKR    left parotidectomy      mixed tumor    GA EVAL,SWALLOW FUNCTION,CINE/VIDEO RECORD  6/5/2021         SALIVARY GLAND SURGERY      TONSILLECTOMY      UPPER GASTROINTESTINAL ENDOSCOPY         Family History   Problem Relation Age of Onset    Hypertension Mother     Heart disease Mother     Hyperthyroidism Mother     Prostate cancer Father         prostate cancer    Cancer Father     Breast cancer Maternal Grandmother     Hyperthyroidism Other     Colon cancer Neg Hx        Social History     Socioeconomic History    Marital status:    Tobacco Use    Smoking status: Never     Passive exposure: Never    Smokeless tobacco: Never   Substance and Sexual Activity    Alcohol use: Yes     Alcohol/week: 0.0 standard drinks     Comment: very seldom     Drug use: No    Sexual activity: Not Currently     Comment: ,  age 52,    Other  Topics Concern    Are you pregnant or think you may be? No    Breast-feeding No   Social History Narrative    ** Merged History Encounter **          Social Determinants of Health     Financial Resource Strain: Low Risk     Difficulty of Paying Living Expenses: Not hard at all   Food Insecurity: No Food Insecurity    Worried About Running Out of Food in the Last Year: Never true    Ran Out of Food in the Last Year: Never true   Transportation Needs: No Transportation Needs    Lack of Transportation (Medical): No    Lack of Transportation (Non-Medical): No   Physical Activity: Unknown    Days of Exercise per Week: Patient refused    Minutes of Exercise per Session: 30 min   Stress: Stress Concern Present    Feeling of Stress : To some extent   Social Connections: Unknown    Frequency of Communication with Friends and Family: More than three times a week    Frequency of Social Gatherings with Friends and Family: Twice a week    Active Member of Clubs or Organizations: Yes    Attends Club or Organization Meetings: More than 4 times per year    Marital Status:    Housing Stability: Low Risk     Unable to Pay for Housing in the Last Year: No    Number of Places Lived in the Last Year: 1    Unstable Housing in the Last Year: No       MEDICATIONS & ALLERGIES:     Current Outpatient Medications on File Prior to Visit   Medication Sig    acetaminophen (TYLENOL) 500 MG tablet Take 1 tablet (500 mg total) by mouth every 4 (four) hours as needed for Pain. (Patient taking differently: Take 1,000 mg by mouth 2 (two) times daily as needed (Headache).)    albuterol (PROVENTIL) 2.5 mg /3 mL (0.083 %) nebulizer solution     albuterol-ipratropium (DUO-NEB) 2.5 mg-0.5 mg/3 mL nebulizer solution Take 3 mLs by nebulization 2 (two) times daily as needed for Shortness of Breath. Rescue    amLODIPine (NORVASC) 10 MG tablet TAKE 1 TABLET(10 MG) BY MOUTH EVERY DAY    apixaban (ELIQUIS) 2.5 mg Tab Take 1 tablet (2.5 mg total) by mouth 2  (two) times daily.    atorvastatin (LIPITOR) 40 MG tablet TAKE 1 TABLET(40 MG) BY MOUTH EVERY DAY (Patient taking differently: Take 40 mg by mouth every evening.)    baclofen (LIORESAL) 10 MG tablet TAKE 1/2 TO 1 TABLET(5 TO 10 MG) BY MOUTH THREE TIMES DAILY AS NEEDED    bisacodyL (DULCOLAX) 10 mg Supp Place 1 suppository (10 mg total) rectally daily as needed (constipation).    carvediloL (COREG) 3.125 MG tablet Take 1 tablet (3.125 mg total) by mouth 2 (two) times daily with meals.    ferrous sulfate, dried (SLOW FE) 160 mg (50 mg iron) TbSR Take 1 tablet (160 mg total) by mouth once daily.    gabapentin (NEURONTIN) 100 MG capsule Take 1 capsule (100 mg total) by mouth 2 (two) times daily. Take one 100mg capsule twice a day and one 300mg capsule at bedtime    gabapentin (NEURONTIN) 300 MG capsule Take 1 capsule (300 mg total) by mouth every evening.    gabapentin 5% baclofen 2% amitriptyline 2% topical cream Apply topically 3 (three) times daily.    hydrOXYchloroQUINE (PLAQUENIL) 200 mg tablet Take 1 tablet (200 mg total) by mouth 2 (two) times daily.    LIDOcaine (LIDODERM) 5 % Place 1 patch onto the skin once daily. Remove & Discard patch within 12 hours or as directed by MD (Patient taking differently: Place 1 patch onto the skin once daily. Remove & Discard patch within 12 hours as needed for pain or as directed by MD)    magnesium oxide (MAG-OX) 400 mg (241.3 mg magnesium) tablet Take 400 mg by mouth once daily.    montelukast (SINGULAIR) 10 mg tablet Take 1 tablet (10 mg total) by mouth once daily.    mycophenolate (CELLCEPT) 500 mg Tab Take 1 tablet (500 mg total) by mouth 2 (two) times daily.    pantoprazole (PROTONIX) 40 MG tablet Take 1 tablet (40 mg total) by mouth once daily.    predniSONE (DELTASONE) 10 MG tablet Take 1 tablet (10 mg total) by mouth once daily.    thiamine 100 MG tablet Take 100 mg by mouth once daily.    traZODone (DESYREL) 50 MG tablet Take 1 tablet (50 mg total) by mouth every  "evening.    benzonatate (TESSALON) 100 MG capsule Take 1 capsule (100 mg total) by mouth 3 (three) times daily as needed for Cough. (Patient not taking: Reported on 2/3/2023)    hydroCHLOROthiazide (HYDRODIURIL) 12.5 MG Tab Take 1 tablet (12.5 mg total) by mouth once daily. (Patient not taking: Reported on 2/16/2023)    [DISCONTINUED] calcium carbonate (TUMS) 200 mg calcium (500 mg) chewable tablet Take 2 tablets (1,000 mg total) by mouth once daily. (Patient not taking: No sig reported)    [DISCONTINUED] famotidine (PEPCID) 20 MG tablet Take 1 tablet (20 mg total) by mouth 2 (two) times daily. (Patient not taking: Reported on 6/15/2022)    [DISCONTINUED] lacosamide (VIMPAT) 10 mg/mL Soln oral solution Take 10 mLs (100 mg total) by mouth every 12 (twelve) hours. (Patient not taking: No sig reported)     Current Facility-Administered Medications on File Prior to Visit   Medication    onabotulinumtoxina injection 200 Units       Review of patient's allergies indicates:  No Known Allergies    OBJECTIVE:     Vital Signs:  Vitals:    02/16/23 1344   BP: 113/69   Pulse: 82   SpO2: 95%   Weight: 68 kg (149 lb 14.6 oz)   Height: 5' 4" (1.626 m)       Body mass index is 25.73 kg/m².     Physical Exam  Constitutional:       General: She is not in acute distress.     Appearance: She is not ill-appearing or toxic-appearing.   HENT:      Head: Normocephalic.      Nose: Nose normal. No congestion.      Mouth/Throat:      Mouth: Mucous membranes are moist.      Pharynx: No oropharyngeal exudate.   Eyes:      General: No scleral icterus.        Right eye: No discharge.         Left eye: No discharge.      Pupils: Pupils are equal, round, and reactive to light.   Cardiovascular:      Rate and Rhythm: Normal rate.      Heart sounds: Murmur heard.   Pulmonary:      Effort: Pulmonary effort is normal. No respiratory distress.      Breath sounds: No stridor. No wheezing, rhonchi or rales.   Chest:      Chest wall: No tenderness. "   Abdominal:      General: Abdomen is flat. There is no distension.      Tenderness: There is no abdominal tenderness.   Musculoskeletal:      Cervical back: Normal range of motion. No rigidity.      Right lower leg: No edema.      Left lower leg: No edema.   Neurological:      Mental Status: She is alert. Mental status is at baseline.      Comments: Sitting in wheel chair     Laboratory  No results found for this or any previous visit (from the past 24 hour(s)).    Diagnostic Results:      Health Maintenance Due   Topic Date Due    Shingles Vaccine (1 of 2) Never done    Pneumococcal Vaccines (Age 65+) (2 - PPSV23 if available, else PCV20) 03/07/2019    Influenza Vaccine (1) 09/01/2022         ASSESSMENT & PLAN:   Ms. Selma Lux is a 87 y.o. female presents for hyponatremia    Unsure of etiology at this time. Differential includes SIADH due to lung pathology, HCTZ use or likely some mixture of both.  Will get a CMP with urine and serum osm to assess current etiology  Xray Chest to look for lung pathology    Hyponatremia  -     Ambulatory referral/consult to Nephrology  -     Osmolality, urine; Future; Expected date: 02/16/2023  -     Comprehensive Metabolic Panel; Future; Expected date: 02/16/2023  -     Osmolality, Serum; Future; Expected date: 02/16/2023  -     Sodium, urine, random; Future; Expected date: 02/16/2023  -     X-Ray Chest PA And Lateral; Future; Expected date: 02/16/2023        RTC in 3 months    Discussed with Dr. Hamilton  - staff attestation to follow      Go Chavez MD  Nephrology PGY-4    [address] [place] [address]  [phone]

## 2023-02-17 NOTE — PROGRESS NOTES
Outpatient Care Management  Plan of Care Follow Up Visit    Patient: Selma Lux  MRN: [id number]  Date of Service: 02/15/2023  Completed by: Theresa Morgan RN  Referral Date: 01/11/2023    Reason for Visit   Patient presents with    Update Plan Of Care       Brief Summary:   Patient with no respiratory issues at this time and has no mobility change since last conversation with daughter. PT/OT have not started coming to the home yet. Daughter, Rosy, stated she got a call from NATALIYA Clark and SANDY Coyne. Stated she missed Amber's call, called her back and this CM noted and relayed to Rosy that Amber has her scheduled for a callback 2/20/2023. Rosy stated she will be getting paperwork together this weekend for the [facility] application for Vet Aid and Attendance program.    Next Steps:  CM will call daughter to check on PT/OT and physical progression

## 2023-02-20 NOTE — TELEPHONE ENCOUNTER
----- Message from Génesis Rosario MD sent at 2/10/2023  4:28 PM CST -----  Can you get her an appt too?  ----- Message -----  From: Bhargav Lee MD  Sent: 2/2/2023   1:05 PM CST  To: Génesis Rosario MD, Olivia Shay RN

## 2023-02-20 NOTE — TELEPHONE ENCOUNTER
Called patient to make appointment to establish care.  No answer to phone.  Left message to call back

## 2023-02-20 NOTE — TELEPHONE ENCOUNTER
----- Message from Amber Martínez LMSW sent at 2/20/2023  2:37 PM CST -----  Regarding: Home Health order request  Hi!    I am the  following this patient for Outpatient Case Management. I spoke with her daughter, Susu, today and they are wanting to resume therapy with her current home health agency. Can new orders for PT/OT and skilled nursing be faxed to [facility] at [phone]?    Thank you  Amber Martínez LMSW

## 2023-02-20 NOTE — PROGRESS NOTES
Outpatient Care Management   - High Risk Patient Assessment    Patient: Selma Lux  MRN:  [id number]  Date of Service:  2/20/2023  Completed by:  Amber Martínez LMSW  Referral Date: 01/11/2023    Reason for Visit   Patient presents with    OPCM EBONIE First Assessment Attempt     2/13/2023    Plan Of Care    Social Work Assessment - High Risk     2/20/2023       Brief Summary:  received a referral from OPCM MARILYN Morgan for the following patient identified psycho-social needs: getting home health reinstated and information on VA Aid and Attendance. Ebonie completed social assessment with pt's POA (finances) and daughter, Susu, via telephone. Susu and her other sister live with pt and assist her with her ADLs.  Susu has been paying out of pocket for sitters to sit with patient 5 days/week 7 hrs per day. EBONIE informed Susu she had spoken to Frances at [facility] (pt's home health agency) regarding pt resuming PT/OT. She stated that Medicare only covers 10 sessions (of any therapy combined) for every 60 days. Pt started home health October 14 til Dec 12, then it was renewed from Dec 13 til Feb 10. Pt was then discharged on Feb 10 because they deemed her stable. Frances reported if MD writes new orders, agency will rerun insurance and therapy should be able to start back up. EBONIE will send message to PCP requesting orders to be faxed to [facility]. EBONIE will also patient portal message VA Aid and Attendance information. Susu agreeable to follow up call. EBONIE will follow up in 1 week. Care plan was created in collaboration with patient/caregiver input.

## 2023-03-01 NOTE — PROGRESS NOTES
Outpatient Care Management   - Care Plan Follow Up    Patient: Selma Lux  MRN:  [id number]  Date of Service:  3/1/2023  Completed by:  Amber Martínez LMSW  Referral Date: 01/11/2023    Reason for Visit   Patient presents with    Update Plan Of Care     3/1/2023       Brief Summary: EBONIE followed up with Carmen at [facility] . She reported they have not received any new home health orders from pt's PCP. EBONIE contacted PCP clinic's MA. Dr. Lee is out of town til next week, but will message him about the orders tomorrow. EBONIE notified pt's daughter, Susu of the above. She reported patient is doing well. She has not had a chance to make a [facility] appointment yet but is working on it. EBONIE will continue to follow up regarding HH orders and will let Susu know ASAP.     Complex Care Plan     Care plan was discussed and completed today with input from patient and/or caregiver.    Patient Instructions     No follow-ups on file.

## 2023-03-03 NOTE — TELEPHONE ENCOUNTER
External home health orders for 60 day are provided.  Face-to-face visit was February 2.  I limited these orders to 60 days, patient has been on extended home health in the past.  Recommended home health clinical social work evaluation as well as physical therapy to prepare patient's family for continuing exercise program independently, and resources for [facility] and home care.  Patient has a relatively limited prognosis due to the extent of her medical illnesses and stroke sequela.  Thank you for your assistance

## 2023-03-03 NOTE — TELEPHONE ENCOUNTER
----- Message from Amber Martínez LMSW sent at 3/3/2023  2:07 PM CST -----  Regarding: FW: Home Health order request  Good afternoon,    I am just following up on the below request. Thank you!    ----- Message -----  From: Amber Martínez LMSW  Sent: 2/20/2023   2:39 PM CST  To: Rosa MONTES Staff  Subject: Home Health order request                        Hi!    I am the  following this patient for Outpatient Case Management. I spoke with her daughter, Susu, today and they are wanting to resume therapy with her current home health agency. Can new orders for PT/OT and skilled nursing be faxed to [facility] at [phone]?    Thank you  Amber Martínez LMSW

## 2023-03-04 NOTE — TELEPHONE ENCOUNTER
External home health orders for 60 day are provided.  Face-to-face visit was February 2.  These orders are for  60 days, patient has been on extended home health in the past.  Recommended home health clinical social work evaluation as well as physical therapy, to prepare patient's family for continuing exercise program independently, and resources for [facility] and home care.       Thank you for your assistance

## 2023-03-10 NOTE — PROGRESS NOTES
EBONIE confirmed with [facility] that PT has started in the home. 1st Attempt to complete SW follow-up for Outpatient Care Management; left message with contact information requesting a return call. SW will reattempt at a later date.

## 2023-03-17 NOTE — PROGRESS NOTES
Outpatient Care Management  Plan of Care Follow Up Visit    Patient: Selma Lux  MRN: [id number]  Date of Service: 03/17/2023  Completed by: Theresa Morgan RN  Referral Date: 01/11/2023    Reason for Visit   Patient presents with    Update Plan Of Care     3/17/2023       Brief Summary:   Patient's daughter, Susu, stated HH PT has started working with her mother and that she has no issues to report at this time. Denies SOB, pain, falls. Susu stated her sister, Madhavi, will be taking over care for their mother in a couple of weeks. CM instructed to call Susu first, to be sure of change.    CM discussed VA Vet Aid & Attendance program application, how daughter is extremely busy with work and need for shared responsibility for her mother's care.     Next Steps:  CM will call to check on patient and family 4/4/2023

## 2023-03-20 PROBLEM — M43.6: Status: ACTIVE | Noted: 2023-01-01

## 2023-03-20 NOTE — ASSESSMENT & PLAN NOTE
--with left hemiplegia  --unable to walk at this time; seeing PT/OT with home health  --able to pivot to wheelchair

## 2023-03-20 NOTE — PATIENT INSTRUCTIONS
Instructions:  - [facility] Nurse Practitioner to schedule home follow-up visit with patient in 6 weeks.  - Continue all medications, treatments and therapies as ordered.   - Follow all instructions, recommendations as discussed.  - Maintain Safety Precautions at all times.  - Attend all medical appointments as scheduled.  - For worsening symptoms: call Primary Care Physician or Nurse Practitioner.  - For emergencies, call 911 or immediately report to the nearest emergency room.  - Limit Risks of environmental exposure to coronavirus/COVID-19 as discussed including: social distancing, hand hygiene, and limiting departures from the home for necessities only.

## 2023-03-20 NOTE — ASSESSMENT & PLAN NOTE
Neck pain, headaches related to contracted and spastic cervical spine musculature  · Advised accupuncture  · PT/OT in the home  · Continue tylenol  · Increase hydration

## 2023-03-20 NOTE — PROGRESS NOTES
Outpatient Care Management   - Care Plan Follow Up    Patient: Selma Lux  MRN:  [id number]  Date of Service:  3/20/2023  Completed by:  Amber Martínez LMSW  Referral Date: 01/11/2023    Reason for Visit   Patient presents with    OPCM EBONIE First Follow-up Attempt     3/10/2023  3/10/2023  1st attempt to complete Follow-Up  for Outpatient Care Management, left message.  Will send portal message.        Case Closure    Update Plan Of Care     3/20/2023       Brief Summary: EBONIE followed up with pt's daughter, Susu, via telephone. She stated pt is doing well and they just left a new PCP appointment that went well. Susu confirmed that home health has started. No other issues or concerns. EBONIE and Susu discussed closing case and she was agreeable. Case closed, notified OPCM RN.   Complex Care Plan     Care plan was discussed and completed today with input from patient and/or caregiver.    Patient Instructions     No follow-ups on file.

## 2023-03-20 NOTE — PATIENT INSTRUCTIONS
TODAY:  - consider vaccines  - follow-up in 4 weeks with nurse visit for advanced care planning  - outpatient at home physical therapy  - tylenol is okay to take  - consider accupuncture for the neck  - try moist heat on the neck  - try to hydrate in the early morning

## 2023-03-20 NOTE — ASSESSMENT & PLAN NOTE
--remains on room air  --denies cough, shortness of breath  --follow-up with Pulmonary  --continue prednisone regimen

## 2023-03-20 NOTE — ASSESSMENT & PLAN NOTE
CVA in 6/2022, rehab at [facility]  · Continues to have residual effects of stroke  · Will restart PT/OT in the home

## 2023-03-20 NOTE — PROGRESS NOTES
[facility]  Medical Home Visit    Visit Date: 3/15/2023  Encounter Provider: Isaiah Llamas  PCP:  Bhargav Hirsch MD    Subjective:      Patient ID: Selma Lux is a 87 y.o. female.    Consult Requested By:  No ref. provider found  Reason for Consult:  Medical visit    Selma is being seen at home due to being seen at home due to physical debility that presents a taxing effort to leave the home, to mitigate high risk of hospital readmission and/or due to the limited availability of reliable or safe options for transportation to the point of access to the provider. Prior to treatment on this visit the chart was reviewed and patient verbal consent was obtained.    Chief Complaint: Establish Care  Selma Lux is an 87-year-old female with recurrent UTIs, heart failure with preserved ejection fraction, past CVA with residual left hemiplegia, vascular dementia, RA, cryptogenic pneumonia, Hashimoto's disease. The patient is being seen in the home in conjunction with her PCP as it is difficult for her to transport physically to \[facility]\"".  The patient reports that she is been  twice and  once.  She currently lives with both of her daughters, Madhavi and Susu in [place].  She previously worked as a general practitioner.  No pets in the home.  Currently hobbies are limited to TV and sitting on her porch.    With this visit today patient is found sitting up in her wheelchair with her daughter, Madhavi, present for the visit. Patient is AAOx3, refuses to perform MMSE.  The patient is currently seeing [facility] with PT/OT/RN.  She requires partial assistance with ADLs. Endorses eating x 3 meals per day, daily BMs, and adequate sleep pattern. Denies falls.  Denies smoking, alcohol abuse, illicit drug use. No additional needs at this this time. All of my information was given to the patient and family if any questions or concerns arise.    VSS. Denies fever, chest pain, shortness of  breath, nausea, vomiting, diarrhea. Risks of environmental exposure to coronavirus discussed including: social distancing, hand hygiene, and limiting departures from the home for necessities only.  Reports understanding and willingness to comply.  All hospital discharge orders reviewed and being followed, all medications reconciled and reviewed, patient and family verbalized understanding. No other needs identified at this time.      I initiated the process of advance care planning today and explained the importance of this process to the patient and family.  I introduced the concept of advance directives to the patient, as well. The patient has not previously appointed a HCPOA. Then the patient received detailed information about the importance of designating a Health Care Power of  (HCPOA). The patient was also instructed to communicate with this person about their wishes for future healthcare, should patient become sick and lose decision-making capacity. We spoke about ACP for 30 minutes.    Attestation: Screening criteria to assess the level of the patient's risk for infection with COVID-19 as recommended by the CDC at the time of the above documented home visit concluded appropriateness to proceed. Universal precautions were maintained at all times, including provider use of 60% alcohol gel hand  immediately prior to entry and upon departing the patient's home.    Review of Systems   Constitutional:  Negative for activity change and appetite change.   HENT:  Negative for congestion and dental problem.    Eyes:  Negative for discharge and itching.   Respiratory:  Negative for choking and chest tightness.    Cardiovascular:  Negative for chest pain and palpitations.   Gastrointestinal:  Negative for rectal pain and vomiting.   Endocrine: Negative for cold intolerance and heat intolerance.   Genitourinary:  Negative for enuresis and flank pain.   Musculoskeletal:  Positive for arthralgias and gait  problem. Negative for myalgias and neck pain.   Skin:  Negative for color change and wound.   Allergic/Immunologic: Negative for environmental allergies and food allergies.   Neurological:  Positive for weakness. Negative for tremors and syncope.   Hematological:  Does not bruise/bleed easily.   Psychiatric/Behavioral:  Negative for decreased concentration and dysphoric mood.      Assessments:  Environmental:  Clean, steps upon entrance  Functional Status:  Partial assist  Safety:  Moderate to high fall risk  Nutritional:  Adequate per patient and family  Home Health/DME/Supplies:  Wheelchair, walker, bedside commode    Objective:   Physical Exam  Vitals reviewed.   Constitutional:       Appearance: She is well-developed and overweight.   HENT:      Head: Normocephalic and atraumatic.   Eyes:      Pupils: Pupils are equal, round, and reactive to light.   Cardiovascular:      Rate and Rhythm: Normal rate.   Pulmonary:      Effort: Pulmonary effort is normal.      Breath sounds: Normal breath sounds.   Abdominal:      General: Bowel sounds are normal.      Palpations: Abdomen is soft.   Musculoskeletal:         General: Normal range of motion.      Cervical back: Normal range of motion and neck supple.   Skin:     General: Skin is warm and dry.   Neurological:      Mental Status: She is alert and oriented to person, place, and time. Mental status is at baseline.      GCS: GCS eye subscore is 4. GCS verbal subscore is 5. GCS motor subscore is 6.      Comments: Left hemiplegia   Psychiatric:         Attention and Perception: Attention normal.         Mood and Affect: Mood normal.         Speech: Speech normal.     Vitals:    03/20/23 1017   BP: 128/68   Pulse: 77   Resp: 14   Temp: 98 °F (36.7 °C)   SpO2: 97%   PainSc: 0-No pain     There is no height or weight on file to calculate BMI.    Assessment:     1. 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 Cryptogenic organizing pneumonia     3. Mixed hyperlipidemia    4. Rheumatoid arthritis of multiple sites    5. Cerebral microvascular disease    6. Vascular dementia, unspecified dementia severity, unspecified whether behavioral, psychotic, or mood disturbance or anxiety        Plan:     Ethical / Legal: Advance Care Planning   Surrogate decision maker:  Name Daughters x 2, Relationship: "  Code Status:  Living will on file  [place]:  None  Other advance directive:  Living will, Capacity to make medical decisions:  Partial, Conflict none       1. 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  -follow[facility]    -prednisone and cellcept per pulm clinic    Assessment & Plan:  --remains on room air  --denies cough, shortness of breath  --follow-up with Pulmonary  --continue prednisone regimen      3. Mixed hyperlipidemia  Assessment & Plan:  --compliant with statin therapy      4. Rheumatoid arthritis of multiple sites  Overview:  -Dx 2012 with Dr No, then Kiera  HCQ since 2012  Prednisone 5 mg/d since 2012 previously (doses changed at times due to other conditions, pulmonary)  Humira one dose April 2018 followed by ICU admission for pnemonia and resp failure    -Managed by rheumatology      Assessment & Plan:  --continue Plaquenil  --follow up with Rheumatology      5. Cerebral microvascular disease  Overview:  -6/20/2022 CT head reference,  on statin. Neurology evaluation post CVA 11/2/2022, and separate 6/15/2022 for motion disorder  --see neurology note 8/30/2017 concerning previous lacunar ?CVA with choreiform movement disorder, and microvasc dz    Assessment & Plan:  --see cva      6. Vascular dementia, unspecified dementia severity, unspecified whether behavioral, psychotic, or mood disturbance or anxiety  Assessment & Plan:  --mentation at baseline per daughter      Other orders  -     montelukast (SINGULAIR) 10 mg tablet; Take 1 tablet (10 mg total) by mouth once daily.  Dispense: 30 tablet; Refill: 2         Total face-to-face time was 75 minutes, >50% of this was spent on counseling and coordination of care. The following issues were discussed: primary and secondary diagnoses, co-morbidities, prescribed medications, treatment modalities, importance of compliance with medical advice, and directives for follow-up care.    Were controlled substances prescribed?  No    Follow Up Appointments:   Future Appointments   Date Time Provider Department Center   3/20/2023 11:00 AM Génesis Rosario MD [facility] MED CLN [facility] PCW   4/19/2023 11:00 AM Farida Garcia MD [facility] CARDIO [facility]   5/25/2023  2:00 PM Go Chavez MD [facility] NEPHRO [facility]   8/3/2023  8:20 AM Bhargav Lee MD [facility] IM [facility] PCW       Signature: Isaiah Llamas NP

## 2023-03-20 NOTE — ASSESSMENT & PLAN NOTE
SDH in 12/2021 no surgery at that time.   · Completed rehab  · Continue PT/OT  · Wants outpatient at home PT

## 2023-03-20 NOTE — PROGRESS NOTES
Primary Care Provider Appointment - [facility]  SHARED NOTE: Spike Riggs (MS4), Dr Rosario (Attending)    Subjective:      Patient ID: Selma Lux is a 87 y.o. female with HTN, HLD, HF, AF, Cryptogenic pneumonia, COPD, ILD, Cerebral microvascular Disease, RA     Chief Complaint: Establish Care    Prior to this visit patient was seeing Dr. Lee, here to est care today  Pt reports overall doing well, main complaint today is to find a new PCP. She would like someone to help manage her manage her complex medical care coordination. She currently sees multiple specialists regularly. She is unable to perform ADL's independently at this time. Lives with daughter and care giver who are able to help her. Care giver is only there for 6 hours a day. Overall is doing well w/o acute complaints. She has a good social support system around her. Has good access to food and housing. No concerns of living situation at this time.   Patient ACP documentation in place.   LApost form was given to patient and her family today. They will discuss at home and bring form to next appointment.     HTN  - Currently taking: amlodipine 10 mg qd, carvedilol 3.125 BID, hctz 12.5 mg qd  - Had home health blood pressures have averages 110/60   - Today, BP is: 140/64    Afib  - Currently taking: Eliquis 2.5 mg BID    HLD  - Currently taking: atorvastatin 40 mg qd  - Last lipid panel was on 6/19/22 wnl   The ASCVD Risk score (Chris DK, et al., 2019) failed to calculate for the following reasons:    The 2019 ASCVD risk score is only valid for ages 40 to 79    The patient has a prior MI or stroke diagnosis     Cryptogenic Pneumonia  - Currently taking: Cellcept 500 mg BID, hydroxychloroquine 200 mg BID, prednisone 10 mg qd  - Followed by pulmonologist  -  Recent CXR stable    Asthma/COPD  - Currently taking: DUO-NEB,   - Smoking status: never  - Followed by pulmonologist    RA  - Currently taking: prednisone 10 mg, hydroxychloroquine 200 mg  "BID  - Currently following with Rheumatology      Mental Health  - Social support system consists of friends and family, lives in one story apartment with daughter and care giver.  - Hobbies include singing    Specialty notes (if they see heme/onc, GI, ortho, ophth, derm, etc...)   Pulmonology  Per note: "Dr. Lux is overall stable from the respiratory standpoint.  She may be able to tolerate modest decrease in her prednisone dose but I don't think she can get off steroids completely.  She probably should be on PJP prophylaxis with TMP-SMW on MWF, but I did not discuss this with her at the time of the visit."    Cardiology   - recently added low-dose hydrochlorothiazide 12.5 mg daily    Nephrologist/Hyponatremia  "Unsure of etiology at this time. Differential includes SIADH due to lung pathology, HCTZ use or likely some mixture of both.  Will get a CMP with urine and serum osm to assess current etiology  Xray Chest to look for lung pathology"    Neurology   Plan:  History of right corona radiata infarct  "-Stroke etiology suspected   --In-depth discussion with patient regarding diagnosis ,imaging findings  & stroke risk factors  -Plan for aggressive risk factor modification and maximum medical management  -- Antithrombotics/ Anticoagulation:  Continue home Eliquis for secondary stroke prevention  - Lipid Management: Target is LDL < 70mg/dL. Continue atorvastatin 40 mg daily;LDL 45  -blood pressure:  Target systolic BP<140mmHg, Patient at  goal today  - Rehab efforts:  Continue outpatient physical and occupational therapy.  Patient may benefit from left lower extremity orthotics due to ankle pronation to help with stability during weight-bearing.  - Diagnostics ordered/pending:  None  -Atrial fibrillation:  Continue home Eliquis.  Follow-up with Cardiology for chronic management"    Rheumatologist  "Seropositive rheumatoid arthritis of multiple sites - Primary   Hard to say if RA is active on video visit but " "overall sounds very stable on combination of HCQ, MMF and still prednisone 10 mg/d  Will see Dr Francis in a few weeks and he can taper prednisone if appropriate for her chronic lung disease  I will order labs to be drawn when comes in for Dr Giang next week  Can RTC me in 6 mo"     Care Gaps:  - [place]: Currently immunocompromised  - Pnemonia: declines at this time, she wants to ask her Pulmonologist  - Flu: Declines      Medications: Does not have pill packs   Although initially interested in Pill Packing. Spoke with Pharmacy, her insurance does not cover mediation fills at our pharmacy. Then family preferred to manage her meds independently.  They will continue to fill meds with [facility]     For future reference below is the patients medication regiment     Morning   Eliquis 2.5 mg  Cellcept 500 mg  Carvedilol 3.125 mg  Plaquenil 200 mg   Amlodipine 5 mg    Noon  Prednisone 10 mg   Magnesium-oxide 400 mg   Ferrous sulfate 160 mg  Thiamine 100 mg   Baclofen 5 mg     Afternoon  Eliquis 2.5 mg   Cellcept 500 mg  Carvedilol 3.125 mg  Plaquenil 200 mg  Lipitor    Bedtime  Gabapentin 100 mg qd  Gabapentin 300 mg qd  Baclofen 10 mg     FU in 6 month     4Ms for Medical Decision-Making in Older Adults    Last Completed EAWV: 1/25/2021    MOBILITY:  Get Up and Go:  No flowsheet data found.  Activities of Daily Living:  Activities of Daily Living 1/25/2021   Ambulation Assistance Required   Ambulation Assistance Walker (wheeled)   Dressing Assistance Required   Dressing Assistance Minimum   Transfers Independent   Transfers Assistance -   Toileting Continent of bladder;Continent of bowel   Toileting Assistance -   Feeding Independent   Cleaning home/Chores Assistance Required   Telephone use Independent   Shopping Assistance Required   Paying bills Assistance Required   Taking meds Assistance Required     Whisper Test:  Whisper Test 1/25/2021   Whisper Test Normal     Disability Status:  Disability Status 1/25/2021 "   Are you deaf or do you have serious difficulty hearing? No   Are you blind or do you have serious difficulty seeing, even when wearing glasses? No   Because of a physical, mental, or emotional condition, do you have serious difficulty concentrating, remembering, or making decisions? Yes   Do you have serious difficulty walking or climbing stairs? Yes   Do you have difficulty dressing or bathing? Yes   Because of a physical, mental, or emotional condition, do you have difficulty doing errands alone such as visiting a doctor's office or shopping? Yes     Nutrition Screening:  Nutrition Screening 1/25/2021   Has food intake declined over the past three months due to loss of appetite, digestive problems, chewing or swallowing difficulties? No decrease in food intake   Involuntary weight loss during the last 3 months? No weight loss   Mobility? Goes out   Has the patient suffered psychological stress or acute disease in the past three months? No   Neuropsychological problems? No psychological problems   Body Mass Index (BMI)?  BMI 23 or greater   Screening Score 14    Screening Score: 0-7 Malnourished, 8-11 At Risk, 12-14 Normal    MENTATION:   Depression Patient Health Questionnaire:  Depression Patient Health Questionnaire 3/20/2023   Over the last two weeks how often have you been bothered by little interest or pleasure in doing things -   Over the last two weeks how often have you been bothered by feeling down, depressed or hopeless Not at all   PHQ-2 Total Score -     Has Dementia Dx: Yes    Cognitive Function Screening:  Cognitive Function Screening 1/25/2021   Clock Drawing Test 2   Mini-Cog 3 Minute Recall 2   Cognitive Function Screening 4     Cognitive Function Screening Total - Less than 4 = Abnormal,  Greater than or equal to 4 = Normal    MEDICATIONS:  High Risk Medications:  Total Active Medications: 3  baclofen - 10 MG  gabapentin - 100 MG, 300 MG    WHAT MATTERS MOST:  Advance Care Planning   ACP  Status:   Patient has had an ACP conversation  Living Will: Yes  Power of : No  LaPOST: No    What is most important right now is to focus on spending time at home, avoiding the hospital, and symptom/pain control    Accordingly, we have decided that the best plan to meet the patient's goals includes continuing with treatment                   Social History     Socioeconomic History    Marital status:    Tobacco Use    Smoking status: Never     Passive exposure: Never    Smokeless tobacco: Never   Substance and Sexual Activity    Alcohol use: Yes     Alcohol/week: 0.0 standard drinks     Comment: very seldom     Drug use: No    Sexual activity: Not Currently     Comment: ,  age 52,    Other Topics Concern    Are you pregnant or think you may be? No    Breast-feeding No   Social History Narrative    ** Merged History Encounter **          Social Determinants of Health     Financial Resource Strain: Low Risk     Difficulty of Paying Living Expenses: Not hard at all   Food Insecurity: No Food Insecurity    Worried About Running Out of Food in the Last Year: Never true    Ran Out of Food in the Last Year: Never true   Transportation Needs: No Transportation Needs    Lack of Transportation (Medical): No    Lack of Transportation (Non-Medical): No   Physical Activity: Unknown    Days of Exercise per Week: Patient refused    Minutes of Exercise per Session: 30 min   Stress: Stress Concern Present    Feeling of Stress : To some extent   Social Connections: Unknown    Frequency of Communication with Friends and Family: More than three times a week    Frequency of Social Gatherings with Friends and Family: Twice a week    Active Member of Clubs or Organizations: Yes    Attends Club or Organization Meetings: More than 4 times per year    Marital Status:    Housing Stability: Low Risk     Unable to Pay for Housing in the Last Year: No    Number of Places Lived in the Last  "Year: 1    Unstable Housing in the Last Year: No       Review of Systems   Constitutional:  Negative for activity change, appetite change, chills, diaphoresis, fever and unexpected weight change.   Respiratory:  Positive for cough. Negative for chest tightness, shortness of breath and wheezing.         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   Gastrointestinal:  Negative for abdominal distention, abdominal pain, constipation and diarrhea.   Endocrine: Negative for polyuria.   Genitourinary:  Negative for difficulty urinating.   Musculoskeletal:  Positive for gait problem and joint swelling. Negative for back pain, myalgias and neck pain.        Hx of RA well managed, Currently follows with rheumatology    Neurological:  Negative for dizziness, facial asymmetry, weakness, light-headedness and headaches.     Objective:   BP (!) 140/64 (BP Location: Right arm, Patient Position: Sitting, BP Method: Medium (Manual))   Pulse 77   Temp 97.5 °F (36.4 °C) (Oral)   Ht 5' 4" (1.626 m)   LMP  (LMP Unknown)   SpO2 100%   BMI 25.73 kg/m²     Physical Exam  Constitutional:       General: She is not in acute distress.     Appearance: She is not ill-appearing.      Comments: Patient in wheel chair. She is well dressed. Here with daughter and caregiver.    HENT:      Head: Normocephalic and atraumatic.      Mouth/Throat:      Comments: Good dentition   Eyes:      Extraocular Movements: Extraocular movements intact.   Cardiovascular:      Rate and Rhythm: Normal rate and regular rhythm.      Pulses: Normal pulses.      Heart sounds: Normal heart sounds.   Pulmonary:      Breath sounds: Wheezing present.   Abdominal:      General: Abdomen is flat. Bowel sounds are normal. There is no distension.      Palpations: Abdomen is soft.      Tenderness: There is no abdominal tenderness.   Musculoskeletal:         General: Swelling and deformity present. No " tenderness.      Right lower leg: No edema.      Left lower leg: Edema present.   Skin:     General: Skin is warm.      Findings: Bruising present.   Neurological:      Mental Status: She is alert. Mental status is at baseline.      Sensory: Sensory deficit present.      Motor: Weakness present.      Gait: Gait abnormal.           Lab Results   Component Value Date    WBC 10.83 01/11/2023    HGB 9.8 (L) 01/11/2023    HCT 27 (L) 01/11/2023     (L) 01/11/2023    CHOL 120 06/19/2022    TRIG 50 06/19/2022    HDL 65 06/19/2022    ALT 12 02/22/2023    AST 11 02/22/2023     02/22/2023    K 3.7 02/22/2023     02/22/2023    CREATININE 1.0 02/22/2023    BUN 18 02/22/2023    CO2 23 02/22/2023    TSH 0.572 10/11/2022    INR 1.1 06/20/2022    HGBA1C 5.3 06/20/2022       Current Outpatient Medications on File Prior to Visit   Medication Sig Dispense Refill    albuterol (PROVENTIL) 2.5 mg /3 mL (0.083 %) nebulizer solution       albuterol-ipratropium (DUO-NEB) 2.5 mg-0.5 mg/3 mL nebulizer solution Take 3 mLs by nebulization 2 (two) times daily as needed for Shortness of Breath. Rescue      amLODIPine (NORVASC) 10 MG tablet TAKE 1 TABLET(10 MG) BY MOUTH EVERY DAY 90 tablet 3    apixaban (ELIQUIS) 2.5 mg Tab Take 1 tablet (2.5 mg total) by mouth 2 (two) times daily. 180 tablet 3    atorvastatin (LIPITOR) 40 MG tablet TAKE 1 TABLET(40 MG) BY MOUTH EVERY DAY (Patient taking differently: Take 40 mg by mouth every evening.) 90 tablet 3    baclofen (LIORESAL) 10 MG tablet TAKE 1/2 TO 1 TABLET(5 TO 10 MG) BY MOUTH THREE TIMES DAILY AS NEEDED 90 tablet 2    carvediloL (COREG) 3.125 MG tablet Take 1 tablet (3.125 mg total) by mouth 2 (two) times daily with meals. 120 tablet 3    ferrous sulfate, dried (SLOW FE) 160 mg (50 mg iron) TbSR Take 1 tablet (160 mg total) by mouth once daily. 90 tablet 3    gabapentin (NEURONTIN) 100 MG capsule Take 1 capsule (100 mg total) by mouth 2 (two) times daily. Take one 100mg  capsule twice a day and one 300mg capsule at bedtime 180 capsule 2    gabapentin (NEURONTIN) 300 MG capsule Take 1 capsule (300 mg total) by mouth every evening. 90 capsule 2    LIDOcaine (LIDODERM) 5 % Place 1 patch onto the skin once daily. Remove & Discard patch within 12 hours or as directed by MD (Patient taking differently: Place 1 patch onto the skin once daily. Remove & Discard patch within 12 hours as needed for pain or as directed by MD) 30 patch 2    magnesium oxide (MAG-OX) 400 mg (241.3 mg magnesium) tablet Take 400 mg by mouth once daily.      montelukast (SINGULAIR) 10 mg tablet Take 1 tablet (10 mg total) by mouth once daily. 30 tablet 2    mycophenolate (CELLCEPT) 500 mg Tab Take 1 tablet (500 mg total) by mouth 2 (two) times daily. 180 tablet 3    pantoprazole (PROTONIX) 40 MG tablet Take 1 tablet (40 mg total) by mouth once daily. 30 tablet 5    predniSONE (DELTASONE) 10 MG tablet Take 1 tablet (10 mg total) by mouth once daily. 30 tablet 2    thiamine 100 MG tablet Take 100 mg by mouth once daily.      traZODone (DESYREL) 50 MG tablet Take 1 tablet (50 mg total) by mouth every evening. 30 tablet 2    acetaminophen (TYLENOL) 500 MG tablet Take 1 tablet (500 mg total) by mouth every 4 (four) hours as needed for Pain. (Patient taking differently: Take 1,000 mg by mouth 2 (two) times daily as needed (Headache).) 60 tablet 0    gabapentin 5% baclofen 2% amitriptyline 2% topical cream Apply topically 3 (three) times daily. (Patient not taking: Reported on 3/20/2023) 240 g 2    hydroCHLOROthiazide (HYDRODIURIL) 12.5 MG Tab Take 1 tablet (12.5 mg total) by mouth once daily. (Patient not taking: Reported on 3/20/2023) 90 tablet 3    hydrOXYchloroQUINE (PLAQUENIL) 200 mg tablet Take 1 tablet (200 mg total) by mouth 2 (two) times daily. 180 tablet 11    [DISCONTINUED] benzonatate (TESSALON) 100 MG capsule Take 1 capsule (100 mg total) by mouth 3 (three) times daily as needed for Cough. 15  capsule 0    [DISCONTINUED] bisacodyL (DULCOLAX) 10 mg Supp Place 1 suppository (10 mg total) rectally daily as needed (constipation). (Patient not taking: Reported on 3/20/2023) 30 suppository 0    [DISCONTINUED] calcium carbonate (TUMS) 200 mg calcium (500 mg) chewable tablet Take 2 tablets (1,000 mg total) by mouth once daily. (Patient not taking: No sig reported) 60 tablet 11    [DISCONTINUED] famotidine (PEPCID) 20 MG tablet Take 1 tablet (20 mg total) by mouth 2 (two) times daily. (Patient not taking: Reported on 6/15/2022) 60 tablet 11    [DISCONTINUED] lacosamide (VIMPAT) 10 mg/mL Soln oral solution Take 10 mLs (100 mg total) by mouth every 12 (twelve) hours. (Patient not taking: No sig reported) 600 mL 11    [DISCONTINUED] montelukast (SINGULAIR) 10 mg tablet Take 1 tablet (10 mg total) by mouth once daily. 30 tablet 2     Current Facility-Administered Medications on File Prior to Visit   Medication Dose Route Frequency Provider Last Rate Last Admin    onabotulinumtoxina injection 200 Units  200 Units Intramuscular Q90 Days Baldomero Giang MD   200 Units at 07/29/20 4402         Assessment:   87 y.o. 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   Completed rehab  Continue PT/OT  Wants outpatient at home PT            Pulmonary    Cryptogenic organizing pneumonia - Primary     Followed by  Pulm  Treated with prednisone and cellcept per Pulm         Pulmonary hypertension due to interstitial lung disease     pHTN seen on echo, in setting of COOP  Treat COOP with cellcept and steroids         COPD (chronic obstructive pulmonary disease)     On chronic steroids for COOP and RA  Continue   Not using inhalers            Immunology/Multi System    Immunocompromised state due to drug therapy     RA and COOP  Continue management per Rheum and Pulm  Advised vaccines  Patient refusing            Hematology    Anticoagulant long-term use     On eliquis for PAF and CVA  Continue per Cardiology            Orthopedic    Rheumatic torticollis     Neck pain, headaches related to contracted and spastic cervical spine musculature  Advised accupuncture  PT/OT in the home  Continue tylenol  Increase hydration            Palliative Care    High risk medication use (Chronic)     Immunosuppressants per Rheum and Pulm. Baclofen for pain  Continue therapies per patient preference              Other    Age-related physical debility     Completed PT/OT with   Restart PT with outpatient at home PT            Health Maintenance         Date Due Completion Date    Shingles Vaccine (1 of 2) Never done ---    Pneumococcal Vaccines (Age 65+) (2 - PPSV23 if available, else PCV20) 05/02/2018 3/7/2018    Influenza Vaccine (1) 09/01/2022 2/5/2020 (Declined)    Override on 2/5/2020: Declined    DEXA Scan 06/04/2023 6/4/2019    Lipid Panel 06/19/2027 6/19/2022    TETANUS VACCINE 03/07/2028 3/7/2018            Future Appointments   Date Time Provider Department Center   4/19/2023 11:00 AM Farida Garcia MD [facility] CARDIO [facility]   5/8/2023  8:00 AM Isaiah Llamas NP [address]   5/25/2023  2:00 PM Go Chavez MD [facility] NEPHRO [place] [facility]   8/3/2023  8:20 AM Bhargav Lee MD [facility] IM [place] [facility] PCW         Follow up ROUTINE F2F. Total clinical care time was 60 min, issues addressed include: EST CARE      Génesis Rosario,  MD/MPH  [facility]  [phone] margaretink

## 2023-03-21 NOTE — ASSESSMENT & PLAN NOTE
Immunosuppressants per Rheum and Pulm. Baclofen for pain  · Continue therapies per patient preference

## 2023-03-27 NOTE — TELEPHONE ENCOUNTER
EBONIE called CG/daughter, Rosy, to offer [facility] [employer] program.  She asked that SW call her sister, Madhavi.  EBONIE called Madhavi and LV.  EBONIE will attempt to reach Madhavi again tomorrow.     3/28/23--EBONIE called Dr. Madhavi Sethi () and offered program.  She agreed to participate and said pt is able to consent herself.  Dr. Simon's sister, Rosy, has POA and will also be present for the phone call in case her consent is needed.  EBONIE sent email with consent information and will continue to follow.

## 2023-04-05 NOTE — PROGRESS NOTES
Outpatient Care Management  Plan of Care Follow Up Visit    Patient: Selma Lux  MRN: [id number]  Date of Service: 04/05/2023  Completed by: Theresa Morgan RN  Referral Date: 01/11/2023    Reason for Visit   Patient presents with    Update Plan Of Care     4/5/2023       Brief Summary:   Spoke with patient's daughter, Madhavi, whom will be taking over the coordination of care from her sister, Rosy. Madhavi stated patient has had (what Dr VILLALOBOS said) may be a flare up of Rheumatoid Arthritis. Patient is expected to begin Outpatient-At-Home physical therapy with [facility]. Stated she expected someone to come to the home yesterday to evaluate patient, but, they did not show up. Stated she is waiting to find out what happened. Dr VILLALOBOS has offered several options for patient for pain control, including ice, heat, ice. Stated this has helped Dr Lux somewhat. Stated they have used the portal to attempt to get an appt with Dr Rouse, Rheumatologist, without success to be seen soon, backed out to late summer with appts.  No issues with COPD mentioned during phone call. Discussed Dementia Care Program with Madhavi and she agrees with this CM to send referral for patient.     CM will send In Basket message to Dr Rouse requesting appt asap for possible RA flare. CM will send referral for patient to the [facility] for Vascular Dementia.    Next Steps:   Patient and daughters are working closely with Dr VILLALOBOS's office for an upcoming event at the clinic with [employer] for advanced care planning. Patient looking forward to Rheumatology appt to possibly be scheduled soon and will work with PT to further her physical wellbeing.     CM will call to check on patient and referrals 5/1/2023.

## 2023-04-06 PROBLEM — M16.4 POST-TRAUMATIC OSTEOARTHRITIS OF BOTH HIPS: Chronic | Status: ACTIVE | Noted: 2019-09-17

## 2023-04-06 NOTE — PROGRESS NOTES
Subject was consented today (4/5/2023) for the following study:     Study title: [employer]  IRB #: 2022.247  IRB approval date: 12/12/2022  Sponsor: [employer]    Screening of inclusion/exclusion criteria evaluation has been reviewed by hSira Lee. At this time, the subject meets all inclusion and does not meet any one of the exclusion criteria.       INFORMED CONSENT PROCESS/ INVOLVEMENT IN CARE/ PROXY   Present for discussion: Selma Lux, Madhavi Sethi, Heide Vigil, Shira Lee  Does subject have capacity to consent per evaluation: yes  Is LAR Consenting for Subject: no      LAR Determined by: Power of       NOTES ON SIGNING ICF/INVOLVEMENT IN CARE/PROXY  [Capacity is determined per chart review, interview with LAR and patient, along with taking into consideration past practices]  Subject has capacity  Subject and caregiver (on caregiver line, not LAR) sign forms    Subject does not have capacity -  POA  POA signs forms, subject signs as well if able    CONSENT:  Verbal Consent: yes  Written Consent: no     Prior to the Informed Consent (IC) being signed, or any protocol required testing, procedure, or intervention being performed, the following was done or discussed:    Purpose of the Study, Qualifications to Participate: YES/NO: yes  Study Design, Schedule and Procedures: YES/NO: yes  Risks, Benefits, Alternative Treatments, Compensation and Costs: YES/NO: yes  Confidentiality and HIPAA Authorization for Release of Medical Records for the research trial/subject's right/study related injury: YES/NO: yes  Study related contact information: YES/NO: yes  Voluntary Participation and Withdrawal from the research trial at any time: YES/NO: yes  Optional samples/procedures (if applicable): Not applicable.  Patient has been offered the opportunity to ask questions regarding the study and all questions were answered satisfactorily: YES/NO: yes  Patient and/or LAR verbalizes understanding of the  study/procedures and agrees to participate: YES/NO: yes  CRC and PI contact information given to LAR and/or patient: No - does not apply. Verbal consent documented in [employer].   Signed copy given to patient and/or LAR: No - does not apply. Participant Information sheet sent via phone  Copy in patient's chart and original uploaded to Epic: No - documented in [employer]      Person Obtaining Consent: Heide Vigil  Witness: Shira Lee  Subject Study ID: [id number]

## 2023-04-06 NOTE — ASSESSMENT & PLAN NOTE
On MMF and prednisone 10 mg/d and pulm symptoms are largely stable with nebulizer that she finds helpful    physical examination notable for possible early clubbing    Dr Francis also advised prophylactic Bactrim which I will prescribe    Should have cbc and CMP q3m while on mycophenolate mofetil

## 2023-04-06 NOTE — PROGRESS NOTES
Subjective:       Patient ID: Selma Lux is a 87 y.o. female.    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 from [place] (lived there 50 years)     RA, mild RF (44, 29) negative CCP; since 2012   TKP L 2009 and  R 2014  CTS bilaterally surgery around 2011 2012 saw Dr No who recommended MTX ; she was afraid  Then saw Dr Qureshi; he Rx  plus prednisone 5 mg/d    April 2018 one injection of Humira and 2 weeks later admitted to ICU with resp infection; spent a month in ICU, then weeks in rehab; and home  end of June; dx cryptogenic organizing pneumonia; prednisone since      Stroke summer 2017  Viral meningitis with 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 2018  Hosp 8/26 with encephalopathy; workup non diagnostic; metabolic encephalopathy vs toxic  March 2020 pneumonia    Today notes tingling of 4th and 5th fingers bilaterally  C/o pain shoulder blades  Notes deviation of DIP joints    Daughter says 2 weeks ago that she flared after visit with Dr Rosario with scapular pain and numbness L arm and B hands  Used ice, heat, lidocaine patch, topical Voltaren and topical compound cream    Cxrays reviewed; severe OA shoulders noted  CT abd/pelvis reviewed; severe degenerative spondylosis L spine with degenerative scoliosis    Review of Systems      Objective:   LMP  (LMP Unknown)      Physical Exam              6/1/2020 6/8/2021 4/6/2023   Tender (SHEIKH-28) 0 / 28  1 / 28  2 / 28    Swollen (SHEIKH-28) 4 / 28 1 / 28  0 / 28    Provider Global -- 10 mm 0 mm   Patient Global -- 25 mm --   ESR -- 22 mm/hr --   CRP -- 4.2 mg/L --   SHEIKH-28 (ESR) -- 3.35 (Moderate disease activity) --   SHEIKH-28 (CRP) -- 2.74 (Low disease activity) --   CDAI Score -- 5.5  --     Lab Results   Component Value Date    SEDRATE 54 (H) 01/23/2022          Assessment:       1. Rheumatoid arthritis of multiple sites    2. High risk medication use    3. Cryptogenic organizing pneumonia    4. 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            Plan:       Problem List Items Addressed This Visit          Active Problems    Post-traumatic osteoarthritis of both hips and shoulders (Chronic)     Chest xrays show severe OA of shoudlers           High risk medication use (Chronic)     Tolerating this but will add Bactrim prophy for PJP            Relevant Medications    sulfamethoxazole-trimethoprim 800-160mg (BACTRIM DS) 800-160 mg Tab    Rheumatoid arthritis of multiple sites - Primary     L hand deformities are more likely related to previous CVA with L hemiparesis    She also has severe, advanced DJD of both shoulders which also accounts for UE and scapular pain with transfers and if using walker    I do not see active peripheral synovitis but will recheck ESR and CRP and get new hand X-rays  For now continue hydroxychloroquine for RA           Relevant Orders    X-Ray Hand 3 View Bilateral    Cryptogenic organizing pneumonia     On MMF and prednisone 10 mg/d and pulm symptoms are largely stable with nebulizer that she finds helpful    physical examination notable for possible early clubbing    Dr Francis also advised prophylactic Bactrim which I will prescribe    Should have cbc and CMP q3m while on mycophenolate mofetil

## 2023-04-06 NOTE — ASSESSMENT & PLAN NOTE
L hand deformities are more likely related to previous CVA with L hemiparesis    She also has severe, advanced DJD of both shoulders which also accounts for UE and scapular pain with transfers and if using walker    I do not see active peripheral synovitis but will recheck ESR and CRP and get new hand X-rays  For now continue hydroxychloroquine for RA

## 2023-04-10 NOTE — PROGRESS NOTES
Care [facility] Dementia Care Management Plan - BASELINE     Assigned Care Team Navigator: Shira Lee LCSW  Referring Provider: OPCM  Primary Care: Génesis Rosario MD       Patient Demographic Information     Name: Selma Lux  Preferred Name: Dr. Lux  MRN: [id number]  : 1935  Age: 87 y.o.  Gender: female  Race: Black or   Ethnicity: Not  or /a  Level of Education: Doctorate   Occupational Status/History: Medical Doctor    Communication Preferences     Language: English   Please contact primary caregiver by Phone for scheduling purposes.   Please send information and forms via E-mail    Caregiver(s) and Social History     Family Status: Pt is , two adult children.  Both live with pt and are involved in her care.    Current Living Situation: Daughter and Other Family Pt's two daughters live with her, one daughter is .   Support System: pt's sister and her son live next door and pt's nephew and his sister live close and are very helpful.    Patient Hobbies/Activities: Goes to [employer] twice per month. Pt is a barr (marcell ferrera). The organist from [employer] sometimes comes and they practice singing together.   Pt has held concerts as recently as 2018 (Covid). 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 Pt has a dense friend group.  They still get together.  Patient Stressors (e.g., Pain): Lack of mobility, RA pain, and degenerative disease in shoulders and spine.   Current Programs/Services: HH with PT, Rheumatologist.        Caregiver Name  Role Relationship Main Contact #   Madhavi Parks Primary Daughter [phone]                           Medical History     Providers   (Relevant to dementia care) Génesis Rosario MD    Types of help wanted   (at baseline) Information about dementia and what to expect in the future, Ideas for coping with the stress of caregiving, Caregiving  strategies for helping Test do as much as he/she still can, Recreational or purposeful activity ideas, Advice about safety risks like falls, wandering, or poor judgment, Advice about medications, Information about caregiving support services like in-home care, adult day care, or care facilities, Information about programs that might help pay for caregiving services, and Help with back-up or crisis planning   Concerns and Goals   (from caregiver and PWD) Mobility, pain.  HH PT, increased mobility   Type of Dementia and Stage  (all that apply) Dementia Type: Vascular Dementia  Stage: Mild  MoCA    Date: 12/6/17      No flowsheet data found.       Medication Review (at baseline)   Medication reviewed with caregiver Yes.   Information is based off patient chart and patient and/or caregiver self-report as of (04/10/2023)  *Make note of any changes to current medication list.*    Current Outpatient Medications   Medication Instructions    acetaminophen (TYLENOL) 500 mg, Oral, Every 4 hours PRN    albuterol (PROVENTIL) 2.5 mg /3 mL (0.083 %) nebulizer solution No dose, route, or frequency recorded. ### not using this ###    albuterol-ipratropium (DUO-NEB) 2.5 mg-0.5 mg/3 mL nebulizer solution 3 mLs, Nebulization, 2 times daily PRN, Rescue ### using this at least once per day, sometimes two ###    amLODIPine (NORVASC) 10 MG tablet TAKE 1 TABLET(10 MG) BY MOUTH EVERY DAY  ## reduced to 5mg ###    apixaban (ELIQUIS) 2.5 mg, Oral, 2 times daily    atorvastatin (LIPITOR) 40 MG tablet TAKE 1 TABLET(40 MG) BY MOUTH EVERY DAY    baclofen (LIORESAL) 10 MG tablet TAKE 1/2 TO 1 TABLET(5 TO 10 MG) BY MOUTH THREE TIMES DAILY AS NEEDED    carvediloL (COREG) 3.125 mg, Oral, 2 times daily with meals    ferrous sulfate, dried (SLOW FE) 160 mg, Oral, Daily  ### every other day ###    gabapentin (NEURONTIN) 100 mg, Oral, 2 times daily, Take one 100mg capsule twice a day and one 300mg capsule at bedtime  ### Taking 400mg at bedtime ###     gabapentin (NEURONTIN) 300 mg, Oral, Nightly  ## included in dose above ###    gabapentin 5% baclofen 2% amitriptyline 2% topical cream Topical (Top), 3 times daily  ## PRN  ##    hydroCHLOROthiazide (HYDRODIURIL) 12.5 mg, Oral, Daily  ###no longer taking ###    hydrOXYchloroQUINE (PLAQUENIL) 200 mg, Oral, 2 times daily    LIDOcaine (LIDODERM) 5 % 1 patch, Transdermal, Daily, Remove & Discard patch within 12 hours or as directed by MD ### PRN ###    magnesium oxide (MAG-OX) 400 mg, Oral, Daily    montelukast (SINGULAIR) 10 mg, Oral, Daily    mycophenolate (CELLCEPT) 500 mg, Oral, 2 times daily    pantoprazole (PROTONIX) 40 mg, Oral, Daily    predniSONE (DELTASONE) 10 mg, Oral, Daily    sulfamethoxazole-trimethoprim 800-160mg (BACTRIM DS) 800-160 mg Tab One tablet by mouth every Monday, Wednesday, Friday to prevent lung infection    thiamine 100 mg, Oral, Daily    traZODone (DESYREL) 50 mg, Oral, Nightly ## PRN ###          Over the counter medications: Gaviscon, occasionally and Voltaren, as needed   Vitamins, supplements: Calcium has been recommended-caregiver requested type of calcium and dose.   Caffeine, alcohol, tobacco, marijuana products: Decaf coffee      THE FOLLOWING CONCERNS WERE IDENTIFIED:  Medication management: Yes--CG asked specifically that pharmD weigh in on time of day meds are being taken--they want to optimize effects and want to mitigate any problems they can based on time of day meds are taken. Ie: tylenol works well together with gabapentin   Access/cost: No  Side effects: No  Recent changes: Yes - Bactrim added   Polypharmacy (>9 routine prescription medications?): Yes -   Behaviors/Sleep: Yes - It is only occasionally that pt is able to sleep through the night.  Family would like to address this.  CPAP was tried in hospital, but ripped machine off.   Other symptoms (falls/incontinence/diarrhea/swelling/itching/weight loss): Yes - pt has fallen occasionally. Pt is incontinent of urine and  "feces.  Sometimes she can sense and family can get her to toilet.    Family is considering a palliative consult because of RA and degenerative joints.      PLAN:  CTN to send medication list to PharmD this week. Review will be addended by PharmD.   CTN will route pharmacist recommendations to Génesis Rosario MD   CTN will share and discuss pharmacist recommendations with caregiver during next scheduled call.         Advanced Care Planning      Living Will: Yes      Copy on chart: yes  La[place]: No:      Medical Power of : Yes      Copy on chart: yes   Financial Power of : Yes      Copy on chart: no   Agent's Name: Dr. Sethi is healthcare power of atty, Susu has POA       Goals of Care      Patient Values: Quality of Life , [place], Reduced Pain, Engage in Activities, and making certain she can stay connected to her friends.     Future Care Plans:   - Long term care goal: Home  - Resources available to pay for care: Income/Private Pay, Dept of [employer] benefits.      Current Concerns - BASELINE     Primary Concern(s)  (Immediate needs) Coming up with plan for chronic pain, reduce pt's worry about CG burden    Cognitive Concerns  (Include decision making capacity) Pt has only mild conative deficits    Primary Behavior Concerns  (Symptoms, triggers, strategies) Irritable often related to pain, sleep     Functional  (Include PWD daily routine and sensory - vision/hearing - information) Vision declined markedly after subdural hematoma (low vision), hearing is also a concern. Has been tested and does not need hearing aids. May be just "tuning out."  Right hand functions better than left, cannot lift shoulders so needs help dressing.        LA-GWEP 4/6/2023   Memory and Recall Mild to moderate memory loss, more noticeable for recent events, interferes with performing everyday activities   Orientation Slight difficulty keeping track of time, may forget day or date more frequently than in the past "   Decision Making and Problem Solving Abilities Slight impairment or takes longer to solve problems, trouble with abstract concepts, decisions still sound   Activities Outside the Home No pretense of independent function outside the home, appears well enough to be taken to activities outside the family home but generally needs to be accompanied   Function at Home and Hobby Activities Only simple chores preserved, very restricted interest in hobbies which are poorly maintained   Toileting and Personal Hygeine Requires some assistance in dressing, hygiene, keeping of personal items, occasionally incontinent   Behavior and Personality Changes Questionable or very mild changes in behavior, personality, emotional control, appropriateness of choices   Language and Communication Abilities Consistent mild word finding difficulties, using descriptive terms or takes longer to get point across, mild problems with comprehension, decreased conversation, may affect reading and writing   Mood Daily mild issues with sadness, depression, anxiety, nervousness or loss of interest/motivation   Attention and Concentration Moderate problems with attention and concentration, may have staring spells or spend time with eyes closed, increased daytime sleepiness       NPIQ RFS 4/6/2023   WHO IS FILLING OUT FORM? Caregiver   Does this patient have false beliefs, such as thinking that others are stealing from him/her or planning to harm him/her in some way? No   Does this patient have hallucinations such as false visions or voices? Belle she/he seem to hear or see things that are not present? No   Is the patient resistive to help from others at times, or hard to handle? Yes   Agitation Agression Severity 1   Agitation/Agression Distress 3   Does the patient seem sad or say that he/she is depressed? Yes   Depression/Dysphoria Severity 1   Depression/Dysphoria Distress 4   Does the patient become upset when  from you? Does he/she have any  other signs of nervousness such as shortness of breath, sighing, being unable tor elax, or feeling excessively tense? Yes   Anxiety Severity 1   Anxiety Distress 4   Does the patient appear to feel good or act excessively happy? No   Does this patient seem less interested in his/her usual activities or in the activities and plans of others? Yes   Apathy/Indifference Severity 1   Apathy/Indifference Distress 3   Does this patient seem to act cumpolsively, for example, talking to strangers as if she/he knows them, or saying things that may hurt people's feelings? No   Is the patient impatient and cranky? Does he/she have difficulty coping with delays or waiting for planned activities? Yes   Irritability/Liability Severity 1   Irritability/Liability Distress 3   Does the patient engage in repetitive activities such as pacing around the house, handling buttons, wrapping string, or doing other things repeatedly? No   Does this patient awaken you during the night, rise too early in the morning, or take excessive naps during the day? Yes   Nightime Behavior Severity 1   Nightime Behavior Distress 3   Has the patient lost or gained weight, or had a change in the type of food he/she likes? No   NPI Total Severity Score 6   NPI Total Distress Score 20           CTN Plan & Recommendations     CTN provided the following resources/recommendations for: Caregiver Follow up on vision and hearing screening recs. Follow up on CG insurance coverage for mental health visits.       Next steps: Submit med list to pharmD for review.

## 2023-04-14 NOTE — PROGRESS NOTES
[facility] Medication Review - PharmD    [facility] patient. Medication review completed by Care Team Navigator (CTN) and Elsa Lincoln PharmD to identify dementia specific medication concerns, as well as opportunities to reduce polypharmacy, high risk medications, and fall risk as needed.     Current Outpatient Medications on File Prior to Visit    Medication Sig Administration considerations    acetaminophen (TYLENOL) 500 MG tablet Take 1 tablet (500 mg total) by mouth every 4 (four) hours as needed for Pain. (Patient taking differently: Take 1,000 mg by mouth 2 (two) times daily as needed (Headache).)     albuterol-ipratropium (DUO-NEB) 2.5 mg-0.5 mg/3 mL nebulizer solution Take 3 mLs by nebulization 2 (two) times daily as needed for Shortness of Breath. Rescue     amLODIPine (NORVASC) 10 MG tablet TAKE 1 TABLET(10 MG) BY MOUTH EVERY DAY (Patient taking differently: Take 5 mg by mouth once daily.)     apixaban (ELIQUIS) 2.5 mg Tab Take 1 tablet (2.5 mg total) by mouth 2 (two) times daily.     atorvastatin (LIPITOR) 40 MG tablet TAKE 1 TABLET(40 MG) BY MOUTH EVERY DAY (Patient taking differently: Take 40 mg by mouth every evening.) Nighttime    baclofen (LIORESAL) 10 MG tablet TAKE 1/2 TO 1 TABLET(5 TO 10 MG) BY MOUTH THREE TIMES DAILY AS NEEDED     carvediloL (COREG) 3.125 MG tablet Take 1 tablet (3.125 mg total) by mouth 2 (two) times daily with meals.     ferrous sulfate, dried (SLOW FE) 160 mg (50 mg iron) TbSR Take 1 tablet (160 mg total) by mouth once daily. (Patient taking differently: Take 160 mg by mouth every other day.) Take on an empty stomach, consider taking with orange juice    gabapentin (NEURONTIN) 100 MG capsule Take 1 capsule (100 mg total) by mouth 2 (two) times daily. Take one 100mg capsule twice a day and one 300mg capsule at bedtime     gabapentin (NEURONTIN) 300 MG capsule Take 1 capsule (300 mg total) by mouth every evening.     gabapentin 5% baclofen 2% amitriptyline 2%  topical cream Apply topically 3 (three) times daily. (Patient not taking: Reported on 3/20/2023)     hydrOXYchloroQUINE (PLAQUENIL) 200 mg tablet Take 1 tablet (200 mg total) by mouth 2 (two) times daily.     LIDOcaine (LIDODERM) 5 % Place 1 patch onto the skin once daily. Remove & Discard patch within 12 hours or as directed by MD (Patient taking differently: Place 1 patch onto the skin once daily. Remove & Discard patch within 12 hours as needed for pain or as directed by MD)     magnesium oxide (MAG-OX) 400 mg (241.3 mg magnesium) tablet Take 400 mg by mouth once daily.     montelukast (SINGULAIR) 10 mg tablet Take 1 tablet (10 mg total) by mouth once daily.     mycophenolate (CELLCEPT) 500 mg Tab Take 1 tablet (500 mg total) by mouth 2 (two) times daily.     pantoprazole (PROTONIX) 40 MG tablet Take 1 tablet (40 mg total) by mouth once daily. Take 30-60 minutes before breakfast.    predniSONE (DELTASONE) 10 MG tablet Take 1 tablet (10 mg total) by mouth once daily. Take with food in the AM    sulfamethoxazole-trimethoprim 800-160mg (BACTRIM DS) 800-160 mg Tab One tablet by mouth every Monday, Wednesday, Friday to prevent lung infection Take with full glass of water    thiamine 100 MG tablet Take 100 mg by mouth once daily.     traZODone (DESYREL) 50 MG tablet Take 1 tablet (50 mg total) by mouth every evening. (Patient taking differently: Take 50 mg by mouth nightly as needed for Insomnia.)         PharmD Recommendations:    Apixaban 2.5mg PO BID should be changed to apixaban 5mg PO BID, it looks like this was adjusted during a hospitalization when she had an acute kidney injury but was not changed back once it resolved.   She can buy a calcium supplement over the counter such as calcium citrate 500mg PO BID. Would prefer the citrate formulation since she is on pantoprazole.   I've included a third column above for meds that may have any special administration instructions.  Medications that can be increasing  her fall risk include baclofen and gabapentin. I see she has issues with pain so she may not be able to decrease these, just be aware of the side effect.  Updated Epic med list to reflect CTN notes and fill history.    Time spent delivering care (in minutes): 25    Plan:  Pharmacist to route recommendations to CTN.

## 2023-04-19 NOTE — PROGRESS NOTES
Subjective:   Patient ID:  Selma Lux is a 87 y.o. 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 Last seen in November for intermittent lower ext edema on lasix 20. No other CHF signs/symptoms. Echo with EF reported as 40%; reviewed with echo staff, EF appeared stable, low normal 50-55%. Trialed increased lasix 40 qd; BMP stable. Felt SANAZ improved slightly for about one week then no change.  Returns for follow up today. Ongoing SANAZ, stable. Unable to tolerate knee high compression stockings previously. Denies PND, orthopnea, CP, palpitiatons, presyncope/syncope. Chronic dyspnea slightly worse; predominantly decreased energy, fatigue and generalized weakness. Feels like it requires more effort for routine activities. Have adjusted PT plans to see if can help with her strength. Also recently adjusted on her RA meds, limited by hip pain.    Also today here to discuss current cardiac meds and if we can decrease her number of medications. Carvedilol off for last week, wanted to see if she could stop. Started at time of paroxysmal AF; no other BB indication. Also on ASA + plavix for years; unclear why plavix initially started - ?at time of MRI with chronic microvascular change and remote punctate stroke. No prior stenting. No clinical prior CVA.         HPI:   BP has been running normal at home.  Denies palpitations or fluttering in the chest  No chest  pain, Orthopnea, PND of heart failure symptoms.   SOB on exertion.   Her leg swelling is unchanged and she cannot tolerate compression stalkings and cannot raise her leg lying flat because she gets SOB with her lung disease.      The patient location is: [place]  The chief complaint leading to consultation is: leg edema, PAF  AV visit  Total time spent with patient: 19 min  Each patient to whom he or she provides medical services by telemedicine is:  (1) informed of the relationship between the physician and patient and the respective role of any other health care provider with respect to management of the patient; and (2) notified that he or she may decline to receive medical services by telemedicine and may withdraw from such care at any time.        HPI:   BP has doing well at home  On Eliquis and ASA, occasional bruising but overall doing well.   No chest pain, Orthopnea, PND of heart failure symptoms.   Denies palpitations or fluttering in the chest  Patient does limited activity, assistance help her take a bath.       The patient location is: home  The chief complaint leading to consultation is: HTN, AF     Visit type:AV VISIT  Face to Face time with patient: 20 minutes of total time spent on the encounter, which includes face to face time and non-face to face time preparing to see the patient (eg, review of tests), Obtaining and/or reviewing separately obtained history, Documenting clinical information in the electronic or other health record, Independently interpreting results (not separately reported) and communicating results to the patient/family/caregiver, or Care coordination (not separately reported).      Each patient to whom he or she provides medical services by telemedicine is:  (1) informed of the relationship between the physician and patient and the respective role of any other health care provider with respect to management of the patient; and (2) notified that he or she may decline to receive  medical services by telemedicine and may withdraw from such care at any time.     Notes:      Recent hospital discharge 02/08/22  Selma Lux is a 84 y.o. 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  She has had encephalopathy during her hospital stay and admission to rehab. She tested positive for COVID on 1/23.  Paramedics state that she has been having very poor oral intake. Patient was admitted to ICU for hypotension and respiratory distress, anticipating rapid deterioration 2/2 her medical history. Patient is on 4L NC, SpO2 stable, she had episode of hypotensions but not sustained, no pressor indicated. Mentation improved in the morning. Patient required sedation for procedure. Family visit seemed to calm her down. Patient no longer required Precedex gtt. Qtc prolongation resolved, likely 2/2 to chronic plaquenil. Patient sat well on room air. Patient ready to transfer to lower level of care unit. ID consulted. Maricruz and rocephin Dc'd 01/28. Transitioned to amp- completing 14d course. Found to have thrush w/up-trending WBC. Started on fluconazole 01/30. Improvement in leukocytosis.  T bili noted to be rising; likely due to fluconazole.  Due to improvement of thrush on physical exam, fluconazole stopped after 7 day course of treatment versus 14. T bili with subsequent improvement.  Deemed stable for discharge to rehab 01/31.  Issues due to COVID positive status and accepting facilities.  Patient discharged to [facility] on 02/08.  Discharge plan discussed with daughter who is in agreement.     HPI:  Patient is having some neck pain.   Patient  is sedentary. Patient c/o muscle aches.   No chest pain, Orthopnea, PND of heart failure symptoms.   Denies palpitations or fluttering in the chest  Recent Subdural hematoma without any significant neurologic deficits.   She lost weight since hospitalization.      The patient location is: home  The chief complaint leading to consultation is: PAF, HTN     Visit type: AV visit  Face to Face time with patient: 20 minutes of total time spent on the encounter, which includes face to face time and non-face to face time preparing to see the patient (eg, review of tests), Obtaining and/or reviewing separately obtained history, Documenting clinical information in the electronic or other health record, Independently interpreting results (not separately reported) and communicating results to the patient/family/caregiver, or Care coordination (not separately reported).      Each patient to whom he or she provides medical services by telemedicine is:  (1) informed of the relationship between the physician and patient and the respective role of any other health care provider with respect to management of the patient; and (2) notified that he or she may decline to receive medical services by telemedicine and may withdraw from such care at any time.      HPI:   No chest pain, Orthopnea, PND of heart failure symptoms.   Denies palpitations or fluttering in the chest  Patient is sedentary  Patient c/o left sided groin pain.     Echo 2023  The left ventricle is normal in size with concentric hypertrophy and normal systolic function. The estimated ejection fraction is 65%.  Normal right ventricular size with normal right ventricular systolic function.  Grade II left ventricular diastolic dysfunction.  Severe left atrial enlargement.  Mild-to-moderate mitral regurgitation.  The estimated PA systolic pressure is 43 mmHg.  Normal central venous pressure (3 mmHg).       HPI:   Doing well. Leg swelling is now gone  No chest pain, Orthopnea,  "PND of heart failure symptoms.   Denies palpitations or fluttering in the chest  Weight is unchanged.     Patient Active Problem List   Diagnosis    Iron deficiency anemia secondary to inadequate dietary iron intake    PUD (peptic ulcer disease)    Submucous leiomyoma of uterus    Cystoid macular edema, left eye    Rheumatoid arthritis of multiple sites    Vitamin D insufficiency    Hypertension, essential    Long QT interval    Hemichorea    Generalized weakness    Vascular dementia    Oropharyngeal dysphagia    AF (paroxysmal atrial fibrillation)    Aortic atherosclerosis    Retinal macroaneurysm of left eye    Thrombocytopenia    Dystonia    Cryptogenic organizing pneumonia    Multinodular goiter    Leukocytosis    Pulmonary hypertension due to interstitial lung disease    Orthopnea    Post-traumatic osteoarthritis of both hips and shoulders    Cerebral microvascular disease    Hyperlipidemia    Thyroid antibody positive    Primary osteoarthritis of left hip    Mood disorder    Spinal stenosis    Lumbar spondylosis    Anticoagulant long-term use    Chronic pain    High risk medication use    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    Decreased range of motion of intervertebral discs of cervical spine    Decreased range of motion (ROM) of shoulder    Immunocompromised state due to drug therapy    Serum total bilirubin elevated    SDH (subdural hematoma)    Age-related physical debility    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  CrCl cannot be calculated (Patient's most recent lab result is older than the maximum 7 days allowed.).    Lab Results   Component Value Date     02/22/2023    K 3.7 02/22/2023     02/22/2023    CO2 23 02/22/2023    BUN 18 02/22/2023    CREATININE 1.0 02/22/2023    GLU 82 02/22/2023    HGBA1C 5.3 06/20/2022    MG 1.7 10/12/2022    AST 11 02/22/2023    ALT 12 02/22/2023    ALBUMIN 3.9 02/22/2023    PROT 7.0 02/22/2023    BILITOT 1.6 (H) 02/22/2023    WBC 10.83 01/11/2023    HGB 9.8 (L) 01/11/2023    HCT 27 (L) 01/11/2023    HCT 32.7 (L) 01/11/2023    MCV 82 01/11/2023     (L) 01/11/2023    INR 1.1 06/20/2022    INR 1.2 06/19/2022    TSH 0.572 10/11/2022    CHOL 120 06/19/2022    HDL 65 06/19/2022    LDLCALC 45.0 (L) 06/19/2022    TRIG 50 06/19/2022       Current Outpatient Medications   Medication Sig    acetaminophen (TYLENOL) 500 MG tablet Take 1 tablet (500 mg total) by mouth every 4 (four) hours as needed for Pain. (Patient taking differently: Take 1,000 mg by mouth 2 (two) times daily as needed (Headache).)    albuterol-ipratropium (DUO-NEB) 2.5 mg-0.5 mg/3 mL nebulizer solution Take 3 mLs by nebulization 2 (two) times daily as needed for Shortness of Breath. Rescue    amLODIPine (NORVASC) 10 MG tablet TAKE 1 TABLET(10 MG) BY MOUTH EVERY DAY (Patient taking differently: Take 5 mg by mouth once daily.)    apixaban (ELIQUIS) 2.5 mg Tab Take 1 tablet (2.5 mg total) by mouth 2 (two) times daily.    atorvastatin (LIPITOR) 40 MG tablet TAKE 1 TABLET(40 MG) BY MOUTH EVERY DAY (Patient taking differently: Take 40 mg by mouth every evening.)    baclofen (LIORESAL) 10 MG tablet TAKE 1/2 TO 1 TABLET(5 TO 10 MG) BY MOUTH THREE TIMES DAILY AS NEEDED    carvediloL (COREG) 3.125 MG tablet Take 1 tablet (3.125 mg total) by mouth 2 (two) times daily with meals.    ferrous sulfate, dried (SLOW FE) 160 mg (50 mg iron) TbSR Take 1 tablet (160 mg total) by mouth once daily. (Patient  taking differently: Take 160 mg by mouth every other day.)    gabapentin (NEURONTIN) 100 MG capsule Take 1 capsule (100 mg total) by mouth 2 (two) times daily. Take one 100mg capsule twice a day and one 300mg capsule at bedtime    gabapentin (NEURONTIN) 300 MG capsule Take 1 capsule (300 mg total) by mouth every evening.    gabapentin 5% baclofen 2% amitriptyline 2% topical cream Apply topically 3 (three) times daily.    LIDOcaine (LIDODERM) 5 % Place 1 patch onto the skin once daily. Remove & Discard patch within 12 hours or as directed by MD (Patient taking differently: Place 1 patch onto the skin once daily. Remove & Discard patch within 12 hours as needed for pain or as directed by MD)    magnesium oxide (MAG-OX) 400 mg (241.3 mg magnesium) tablet Take 400 mg by mouth once daily.    montelukast (SINGULAIR) 10 mg tablet Take 1 tablet (10 mg total) by mouth once daily.    mycophenolate (CELLCEPT) 500 mg Tab Take 1 tablet (500 mg total) by mouth 2 (two) times daily.    pantoprazole (PROTONIX) 40 MG tablet Take 1 tablet (40 mg total) by mouth once daily.    predniSONE (DELTASONE) 10 MG tablet Take 1 tablet (10 mg total) by mouth once daily.    sulfamethoxazole-trimethoprim 800-160mg (BACTRIM DS) 800-160 mg Tab One tablet by mouth every Monday, Wednesday, Friday to prevent lung infection    thiamine 100 MG tablet Take 100 mg by mouth once daily.    traZODone (DESYREL) 50 MG tablet Take 1 tablet (50 mg total) by mouth every evening. (Patient taking differently: Take 50 mg by mouth nightly as needed for Insomnia.)    hydrOXYchloroQUINE (PLAQUENIL) 200 mg tablet Take 1 tablet (200 mg total) by mouth 2 (two) times daily.     No current facility-administered medications for this visit.       ROS    Objective:   Physical Exam  Constitutional:       General: She is not in acute distress.     Appearance: She is well-developed. She is not diaphoretic.   HENT:      Head: Normocephalic and atraumatic.      Nose: Nose normal.       Mouth/Throat:      Pharynx: No oropharyngeal exudate.   Eyes:      General: No scleral icterus.        Right eye: No discharge.         Left eye: No discharge.      Conjunctiva/sclera: Conjunctivae normal.      Pupils: Pupils are equal, round, and reactive to light.   Neck:      Thyroid: No thyromegaly.      Vascular: No JVD.      Trachea: No tracheal deviation.   Cardiovascular:      Rate and Rhythm: Normal rate and regular rhythm.      Pulses: Intact distal pulses.      Heart sounds: Normal heart sounds. No murmur heard.    No friction rub. No gallop.   Pulmonary:      Effort: Pulmonary effort is normal. No respiratory distress.      Breath sounds: No stridor. Rales present. No wheezing (minimal at the bases).   Chest:      Chest wall: No tenderness.   Abdominal:      General: Bowel sounds are normal. There is no distension.      Palpations: Abdomen is soft. There is no mass.      Tenderness: There is no abdominal tenderness. There is no guarding or rebound.   Musculoskeletal:         General: No tenderness. Normal range of motion.      Cervical back: Normal range of motion and neck supple.   Lymphadenopathy:      Cervical: No cervical adenopathy.   Skin:     General: Skin is warm.      Coloration: Skin is not pale.      Findings: No erythema or rash.   Neurological:      Mental Status: She is alert and oriented to person, place, and time.      Cranial Nerves: No cranial nerve deficit.      Motor: No abnormal muscle tone.      Coordination: Coordination normal.      Deep Tendon Reflexes: Reflexes are normal and symmetric. Reflexes normal.   Psychiatric:         Behavior: Behavior normal.         Thought Content: Thought content normal.         Judgment: Judgment normal.       Assessment:     1. Hypertension, essential    2. 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 Chronic obstructive pulmonary disease, unspecified COPD type    4. Pulmonary hypertension due to interstitial lung  disease    5. AF (paroxysmal atrial fibrillation)    6. Aortic atherosclerosis    7. Mixed hyperlipidemia    8. Orthopnea    9. Rheumatoid arthritis of multiple sites    10. Iron deficiency anemia secondary to inadequate dietary iron intake        Plan:     Doing well. No change in meds.   Selma was seen today for establish care, follow-up and fatigue.    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   Counseled on importance of heart healthy diet low in saturated and trans fat and salt as well gradually starting a regular aerobic exercise regimen with goal of 30min 5x/week. Recommend BP diary. Call if systolic BP > 130 mmHg on checking repeatedly

## 2023-04-21 NOTE — PROGRESS NOTES
PharmD review of meds complete.  EBONIE called CG, Dr. Sethi, to provide pharmD feedback and LVM.  EBONIE sent med recs to PCP, Dr. Rosario and remains available.

## 2023-04-25 NOTE — TELEPHONE ENCOUNTER
Please call Dr Lux and see if she still wants to participate in that interview with the medical students this Thursday. I also sent the details to her inbox.    Thanks,  Dr VILLALOBOS

## 2023-05-02 NOTE — PROGRESS NOTES
Outpatient Care Management  Plan of Care Follow Up Visit    Patient: Selma Lux  MRN: [id number]  Date of Service: 05/01/2023  Completed by: Theresa Morgan RN  Referral Date: 01/11/2023    Reason for Visit   Patient presents with    OPCM RN Follow Up Call     5/1/2023    Case Closure     5/2/2023       Brief Summary:   Patient's daughter, Madhavi, has taken over caring for her mother and coordination of all appointments. Patient with no acute problems with COPD, denies SOB. Patient was supposed to start PT with [facility] in the home. Program is set up as Outpatient style PT with all equipment needed brought to the home. Daughter stated they called last month, stated they were coming and did not show up. Daughter stated her mother saw and requested to tour [facility] on [facility]. Stated she is interested in attending therapy there. 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 Patient also has been enrolled in the [facility]. Daughter has an initial enrollment appt with them at 1:00pm 5/2/2023. At that time, she will enquire about their  role in patient's coordination of care. This CM will call her after that appt to decide whether to close OPCM program or keep her enrolled for any needs. Patient has been in pain which multi-modal approach has been coordinated between Dr VILLALOBOS and daughter. Daughter stated the last ice and heat along with OTC pain medications have helped relieve flare up.     NATALIA called [facility] ([phone]) to be sure patient is not enrolled in their program and billing Medicare.  CM called [facility] ([phone]) to initiate enrollment in their program, in-basket message sent to GHANSHYAM VILLALOBOS's office to request referral be faxed to [facility] (f: [phone])  NATALIA ACTION PLAN   :  Discussion with daughter, Madhavi, after she spoke with  [facility] Shira FISHER May, results in agreement to close OPCM program and have patient followed by [facility] FISHER May ([phone]).  Report called to Shira concerning the OP PT. OPCM case closed 5/2/2023.

## 2023-05-03 NOTE — PROGRESS NOTES
Protocol: The [employer] Effectiveness Study  Identifier: [id number]  IRB#: [id number]  PI: Dr. Adrien Lagos, PhD  CO-I: Dr. Laura Waller PsyD  Version Date: 12/05/2022  Pt Study ID: [id number]  Visit Month: M2  Care Plan documented on: (04/10/2023) - Please refer to note for more information.     Timeline:   (*Note for CTN - complete timeline using completed or planned date for study events. Add any important events (i.e. Withdrawals, Lost to Follow Up, Adverse Events, etc.).    Consent: Completed(04/05/2023)  Baseline questionnaires: Completed(04/05/2023)  6-month questionnaires: Planned(09/2023)  12-month questionnaires: Planned(03/2023)      Visit Note:   Spoke with caregiver  Jossie (Daughter) for month 2 visit. Pt has been walking up and down the steps to the front of her home (5 steps) and stumbled once winding up in a seated position.  She was uninjured.  Family decided it is best for pt to start using the walker around the house.  She already uses it when out of the house.  Dr. Lux had a speaking engagement where she spoke to med students at [facility].  This was uplifting for her according to her CG. CG feels pt's meds are optimized. CG is trying to optimize PT--outpatient vs HH.  CG mood is better.  She said she is trying to figure out how she will spend her time when not caregiving.       Falls in the past month: 1  UTIs in the past month: 0  Hospital encounters in the past month (reported by caregiver): 0      Next monthly visit scheduled for: 06/01/2023. Caregiver knows how to reach CTN, and is encouraged to do so, as needed before our next scheduled call.     Action items for CTN: Communicate with Theresa Morgan who is CM for Dr. Gallardo and is retiring.    Later-SW spoke with Theresa--she will close this case and this SW will continue to follow.         ClinCard sent/loaded: no  (*Only applies to 6-month and 12-month visits)

## 2023-05-03 NOTE — TELEPHONE ENCOUNTER
----- Message from Jessica Russell MA sent at 5/2/2023 12:41 PM CDT -----  Regarding: FW: fax referral please    ----- Message -----  From: Theresa Morgan RN  Sent: 5/2/2023  10:50 AM CDT  To: Abraham Phillips Staff  Subject: fax referral please                              Dr Rosario Staff:    Please fax referral for Outpatient PT/OT to [facility] (fax: [phone]).  If you need to call them their #[phone].     Patient and family request.    Thank you,  Ladan Morgan RN, CCM [facility]  [email or web address]  [phone]

## 2023-05-08 PROBLEM — J84.116 CRYPTOGENIC ORGANIZING PNEUMONIA: Status: RESOLVED | Noted: 2019-01-24 | Resolved: 2023-01-01

## 2023-05-10 NOTE — TELEPHONE ENCOUNTER
Patient is declining. Family wants hospice order for care in the home. Application will be faxed to [facility].    Génesis Rosario MD/MPH  [facility]  [phone] spectralink

## 2023-05-10 NOTE — TELEPHONE ENCOUNTER
Please call patient's daughter and see if they are interested in hospice, coming to clinic tomorrow for IV fluids, or going to the ED.    Patient is in the home vomiting.    Thanks,  Dr VILLALOBOS

## 2023-05-12 NOTE — PROGRESS NOTES
Primary Care Provider Appointment - [facility]  SHARED NOTE: Penny Rader (MS4), Dr Rosario (Attending)    Subjective:      Patient ID: Selma Lux is a 87 y.o. female with immunosuppression, hemiplegia, h/o stroke     Chief Complaint: Fatigue, Dizziness, and Emesis    Prior to this visit, patient's last encounter with PCP was 3/20/2023.    Daughters are with her today.     Patient seen urgently today for weakness X3 days. Patient did not want to go to ED when daughters called on Wed evening. Daughters relayed they wanted care in the home only. Was evaluated by hospice on Wed night, but had improved by then and family elected to not enroll in hospice. She did better on Thursday and tolerated PO.    Today family wanted patient to be evaluated in clinic because she had returned to her original complaint of weakness. They requested triage and treatment in clinic of reversible conditions if paitent tolerated that.    Labs drawn in the room at 12:40PM: procal, sed rate, CBC, CMP. Patient's condtion worsened, then emergency documentation process started, and OPCM lead nurse contacted for assistance.     12:46PM Juju Rosado and Britney Enciso were both present in room as patient was worked up and treated as follows:      [facility] Emergency Documentation Flowsheet:     12:50PM Patient vomiting during exam. IM Phenergran given.     1:03PM BP 95/60 with thready pulse. Patient is hypOtensive. Family was open to ED transfer if we could not treat her in clinic. IV fluids ordered    1:07PM transferred from wheelchair to exam table     1:09PM Pulse 85bpm    1:13PM Normal lung auscultation bilaterally     1:15PM patient answering questions from daughter. IV placement attempted - failed     1:23PM 2nd IV placement attempted - failed    1:27PM ambulance called for transport to [facility]    1:45PM ambulance arrived     1:50PM EKG with EMS        1:56 Glucose was 74     1:58PM ambulance (#[id number])  transported  patient to ED      4Ms for Medical Decision-Making in Older Adults    Last Completed EAWV: 1/25/2021    MOBILITY:  Get Up and Go:  No flowsheet data found.  Activities of Daily Living:  Activities of Daily Living 1/25/2021   Ambulation Assistance Required   Ambulation Assistance Walker (wheeled)   Dressing Assistance Required   Dressing Assistance Minimum   Transfers Independent   Transfers Assistance -   Toileting Continent of bladder;Continent of bowel   Toileting Assistance -   Feeding Independent   Cleaning home/Chores Assistance Required   Telephone use Independent   Shopping Assistance Required   Paying bills Assistance Required   Taking meds Assistance Required     Whisper Test:  Whisper Test 1/25/2021   Whisper Test Normal     Disability Status:  Disability Status 1/25/2021   Are you deaf or do you have serious difficulty hearing? No   Are you blind or do you have serious difficulty seeing, even when wearing glasses? No   Because of a physical, mental, or emotional condition, do you have serious difficulty concentrating, remembering, or making decisions? Yes   Do you have serious difficulty walking or climbing stairs? Yes   Do you have difficulty dressing or bathing? Yes   Because of a physical, mental, or emotional condition, do you have difficulty doing errands alone such as visiting a doctor's office or shopping? Yes     Nutrition Screening:  Nutrition Screening 1/25/2021   Has food intake declined over the past three months due to loss of appetite, digestive problems, chewing or swallowing difficulties? No decrease in food intake   Involuntary weight loss during the last 3 months? No weight loss   Mobility? Goes out   Has the patient suffered psychological stress or acute disease in the past three months? No   Neuropsychological problems? No psychological problems   Body Mass Index (BMI)?  BMI 23 or greater   Screening Score 14    Screening Score: 0-7 Malnourished, 8-11 At Risk, 12-14  Normal    MENTATION:   Depression Patient Health Questionnaire:  Depression Patient Health Questionnaire 4/6/2023   Over the last two weeks how often have you been bothered by little interest or pleasure in doing things Several days   Over the last two weeks how often have you been bothered by feeling down, depressed or hopeless More than half the days   PHQ-2 Total Score 3   PHQ-9 Total Score 9   PHQ-9 Interpretation Mild     Has Dementia Dx: Yes    Cognitive Function Screening:  Cognitive Function Screening 1/25/2021   Clock Drawing Test 2   Mini-Cog 3 Minute Recall 2   Cognitive Function Screening 4     Cognitive Function Screening Total - Less than 4 = Abnormal,  Greater than or equal to 4 = Normal    MEDICATIONS:  High Risk Medications:  Total Active Medications: 3  baclofen - 10 MG  gabapentin - 100 MG, 300 MG    WHAT MATTERS MOST:  Advance Care Planning   ACP Status:   Patient has had an ACP conversation  Living Will: Yes  Power of : No  LaPOST: No    What is most important right now is to focus on spending time at homeavoiding the hospitalsymptom/pain control    Accordingly, we have decided that the best plan to meet the patient's goals includes continuing with treatment      What matters most to patient today is: feeling better                 Social History     Socioeconomic History    Marital status:    Tobacco Use    Smoking status: Never     Passive exposure: Never    Smokeless tobacco: Never   Substance and Sexual Activity    Alcohol use: Yes     Alcohol/week: 0.0 standard drinks     Comment: very seldom     Drug use: No    Sexual activity: Not Currently     Comment: ,  age 52,    Other Topics Concern    Are you pregnant or think you may be? No    Breast-feeding No   Social History Narrative    ** Merged History Encounter **          Social Determinants of Health     Financial Resource Strain: Low Risk     Difficulty of Paying Living Expenses: Not hard at all   Food Insecurity:  No Food Insecurity    Worried About Running Out of Food in the Last Year: Never true    Ran Out of Food in the Last Year: Never true   Transportation Needs: No Transportation Needs    Lack of Transportation (Medical): No    Lack of Transportation (Non-Medical): No   Physical Activity: Inactive    Days of Exercise per Week: 0 days    Minutes of Exercise per Session: 30 min   Stress: No Stress Concern Present    Feeling of Stress : Only a little   Social Connections: Unknown    Frequency of Communication with Friends and Family: More than three times a week    Frequency of Social Gatherings with Friends and Family: Once a week    Active Member of Clubs or Organizations: Yes    Attends Club or Organization Meetings: More than 4 times per year    Marital Status:    Housing Stability: Low Risk     Unable to Pay for Housing in the Last Year: No    Number of Places Lived in the Last Year: 1    Unstable Housing in the Last Year: No       Review of Systems   Constitutional:  Positive for activity change, appetite change and fatigue.   Respiratory:  Positive for shortness of breath.    Gastrointestinal:  Positive for nausea and vomiting.   Musculoskeletal:  Positive for arthralgias and back pain.   Neurological:  Positive for dizziness.   Psychiatric/Behavioral:  Positive for dysphoric mood.      Objective:   BP 95/60   Pulse 85   LMP  (LMP Unknown)     Physical Exam  Constitutional:       Appearance: She is ill-appearing.      Comments: Ambulating by wheelchair   HENT:      Mouth/Throat:      Mouth: Mucous membranes are dry.      Comments: Dry mucous membranes  Pulmonary:      Effort: Pulmonary effort is normal.   Neurological:      Mental Status: She is alert.      Motor: Weakness present.      Gait: Gait abnormal.   Psychiatric:      Comments: Intermittently conscious  Arousable           Lab Results   Component Value Date    WBC 10.83 01/11/2023    HGB 9.8 (L) 01/11/2023    HCT 27 (L) 01/11/2023     (L)  01/11/2023    CHOL 120 06/19/2022    TRIG 50 06/19/2022    HDL 65 06/19/2022    ALT 12 02/22/2023    AST 11 02/22/2023     02/22/2023    K 3.7 02/22/2023     02/22/2023    CREATININE 1.0 02/22/2023    BUN 18 02/22/2023    CO2 23 02/22/2023    TSH 0.572 10/11/2022    INR 1.1 06/20/2022    HGBA1C 5.3 06/20/2022       Current Outpatient Medications on File Prior to Visit   Medication Sig Dispense Refill    acetaminophen (TYLENOL) 500 MG tablet Take 1 tablet (500 mg total) by mouth every 4 (four) hours as needed for Pain. (Patient taking differently: Take 1,000 mg by mouth 2 (two) times daily as needed (Headache).) 60 tablet 0    albuterol-ipratropium (DUO-NEB) 2.5 mg-0.5 mg/3 mL nebulizer solution Take 3 mLs by nebulization 2 (two) times daily as needed for Shortness of Breath. Rescue      amLODIPine (NORVASC) 10 MG tablet TAKE 1 TABLET(10 MG) BY MOUTH EVERY DAY (Patient taking differently: Take 5 mg by mouth once daily.) 90 tablet 3    apixaban (ELIQUIS) 2.5 mg Tab Take 1 tablet (2.5 mg total) by mouth 2 (two) times daily. 180 tablet 3    atorvastatin (LIPITOR) 40 MG tablet TAKE 1 TABLET(40 MG) BY MOUTH EVERY DAY (Patient taking differently: Take 40 mg by mouth every evening.) 90 tablet 3    baclofen (LIORESAL) 10 MG tablet TAKE 1/2 TO 1 TABLET(5 TO 10 MG) BY MOUTH THREE TIMES DAILY AS NEEDED 90 tablet 2    carvediloL (COREG) 3.125 MG tablet Take 1 tablet (3.125 mg total) by mouth 2 (two) times daily with meals. 120 tablet 3    ferrous sulfate, dried (SLOW FE) 160 mg (50 mg iron) TbSR Take 1 tablet (160 mg total) by mouth once daily. (Patient taking differently: Take 160 mg by mouth every other day.) 90 tablet 3    gabapentin (NEURONTIN) 100 MG capsule Take 1 capsule (100 mg total) by mouth 2 (two) times daily. Take one 100mg capsule twice a day and one 300mg capsule at bedtime 180 capsule 2    gabapentin (NEURONTIN) 300 MG capsule Take 1 capsule (300 mg total) by mouth every evening. 90 capsule 2     gabapentin 5% baclofen 2% amitriptyline 2% topical cream Apply topically 3 (three) times daily. 240 g 2    hydrOXYchloroQUINE (PLAQUENIL) 200 mg tablet Take 1 tablet (200 mg total) by mouth 2 (two) times daily. 180 tablet 11    LIDOcaine (LIDODERM) 5 % Place 1 patch onto the skin once daily. Remove & Discard patch within 12 hours or as directed by MD (Patient taking differently: Place 1 patch onto the skin once daily. Remove & Discard patch within 12 hours as needed for pain or as directed by MD) 30 patch 2    magnesium oxide (MAG-OX) 400 mg (241.3 mg magnesium) tablet Take 400 mg by mouth once daily.      montelukast (SINGULAIR) 10 mg tablet Take 1 tablet (10 mg total) by mouth once daily. 30 tablet 2    mycophenolate (CELLCEPT) 500 mg Tab Take 1 tablet (500 mg total) by mouth 2 (two) times daily. 180 tablet 3    pantoprazole (PROTONIX) 40 MG tablet Take 1 tablet (40 mg total) by mouth once daily. 30 tablet 5    sulfamethoxazole-trimethoprim 800-160mg (BACTRIM DS) 800-160 mg Tab One tablet by mouth every Monday, Wednesday, Friday to prevent lung infection 36 tablet 3    thiamine 100 MG tablet Take 100 mg by mouth once daily.      traZODone (DESYREL) 50 MG tablet Take 1 tablet (50 mg total) by mouth every evening. (Patient taking differently: Take 50 mg by mouth nightly as needed for Insomnia.) 30 tablet 2    [DISCONTINUED] calcium carbonate (TUMS) 200 mg calcium (500 mg) chewable tablet Take 2 tablets (1,000 mg total) by mouth once daily. (Patient not taking: No sig reported) 60 tablet 11    [DISCONTINUED] famotidine (PEPCID) 20 MG tablet Take 1 tablet (20 mg total) by mouth 2 (two) times daily. (Patient not taking: Reported on 6/15/2022) 60 tablet 11    [DISCONTINUED] lacosamide (VIMPAT) 10 mg/mL Soln oral solution Take 10 mLs (100 mg total) by mouth every 12 (twelve) hours. (Patient not taking: No sig reported) 600 mL 11     No current facility-administered medications on file prior to visit.         Assessment:    87 y.o. female with multiple co-morbid illnesses here to follow-up with PCP and continue work-up of chronic issues    Plan:     Problem List Items Addressed This Visit          Other    Generalized weakness - Primary    Relevant Orders    Comprehensive Metabolic Panel    CBC Auto Differential    Sedimentation rate    Procalcitonin    CT Head Without Contrast    Urinalysis    Urine culture    POCT URINE DIPSTICK WITHOUT MICROSCOPE     Other Visit Diagnoses       Nausea and vomiting, unspecified vomiting type        Relevant Medications    promethazine injection 25 mg    Other Relevant Orders    Refer to Emergency Dept.    Hypotension, unspecified hypotension type        Relevant Orders    Refer to Emergency Dept.            Health Maintenance         Date Due Completion Date    Shingles Vaccine (1 of 2) Never done ---    Pneumococcal Vaccines (Age 65+) (2 - PPSV23 if available, else PCV20) 05/02/2018 3/7/2018    DEXA Scan 06/04/2023 6/4/2019    Influenza Vaccine (Season Ended) 09/01/2023 2/5/2020 (Declined)    Override on 2/5/2020: Declined    Lipid Panel 06/19/2027 6/19/2022    TETANUS VACCINE 03/07/2028 3/7/2018            Future Appointments   Date Time Provider Department Center   5/12/2023  4:15 PM [facility] OIC-CT2 500 LB LIMIT [facility] CTS [facility]   5/25/2023  2:00 PM Go Chavez MD [facility] NEPHRO [facility]   5/29/2023  8:00 AM Megan Chandra NP [facility] C3[facility] [place]   7/11/2023 11:45 AM LAB, [facility] hospital [facility]   7/25/2023 11:00 AM Lonnie Rouse MD [facility] RHEUM [facility]   8/3/2023  8:20 AM Bhargav Lee MD [facility] IM [facility] PCW         Follow up if symptoms worsen or fail to improve. Total clinical care time was 60 min, issues addressed include: hypotension and AMS      Génesis Rosario MD/MPH  [facility]  [phone] spectralink

## 2023-05-12 NOTE — ED PROVIDER NOTES
Encounter date: 4:00 PM 05/13/2023    Source of History   Patient/EMS/chart review    Chief Complaint   Pt presents with:   Weakness (Right sided weakness X3-4 days. Pt. Became hypotensive and had syncopal episode at clinic)      History Of Present Illness   Selma Lux is a 87 y.o. female with history of subdural hematoma, drug-induced adrenal insufficiency, thyroid abnormalities, hypertension, previous stroke, COPD who presents to the ED with weakness times 3-4 days.  Patient was brought in by EMS from clinic.  Patient is unable to provide much history.  She states that she has head pain.  She is moving all her extremities.  She denies chest pain or shortness of breath.  She states she has not had any recent fall.    On chart review was noted that she was seen in primary care today for fatigue, dizziness and emesis.  She reported that she had been weak x3 days.  She did not want to go to the ED. she was evaluated by hospice on Wednesday.  She would like did not to enroll in hospice.  She was given IM Phenergan for her vomiting and was noted to have hypotension with a blood pressure of 95/60.  No other history available at this time.     This is the extent to the patients complaints today here in the emergency department.  Past History   Review of patient's allergies indicates:  No Known Allergies    Current Facility-Administered Medications on File Prior to Encounter   Medication Dose Route Frequency Provider Last Rate Last Admin    promethazine injection 25 mg  25 mg Intramuscular 1 time in Clinic/HOD Génesis Rosario MD        [DISCONTINUED] ondansetron injection 8 mg  8 mg Intramuscular 1 time in Clinic/HOD Génesis Rosario MD         Current Outpatient Medications on File Prior to Encounter   Medication Sig Dispense Refill    acetaminophen (TYLENOL) 500 MG tablet Take 1 tablet (500 mg total) by mouth every 4 (four) hours as needed for Pain. (Patient taking differently: Take 1,000 mg by mouth  2 (two) times daily as needed (Headache).) 60 tablet 0    albuterol-ipratropium (DUO-NEB) 2.5 mg-0.5 mg/3 mL nebulizer solution Take 3 mLs by nebulization 2 (two) times daily as needed for Shortness of Breath. Rescue      amLODIPine (NORVASC) 10 MG tablet TAKE 1 TABLET(10 MG) BY MOUTH EVERY DAY (Patient taking differently: Take 5 mg by mouth once daily.) 90 tablet 3    apixaban (ELIQUIS) 2.5 mg Tab Take 1 tablet (2.5 mg total) by mouth 2 (two) times daily. 180 tablet 3    atorvastatin (LIPITOR) 40 MG tablet TAKE 1 TABLET(40 MG) BY MOUTH EVERY DAY (Patient taking differently: Take 40 mg by mouth every evening.) 90 tablet 3    baclofen (LIORESAL) 10 MG tablet TAKE 1/2 TO 1 TABLET(5 TO 10 MG) BY MOUTH THREE TIMES DAILY AS NEEDED 90 tablet 2    carvediloL (COREG) 3.125 MG tablet Take 1 tablet (3.125 mg total) by mouth 2 (two) times daily with meals. 120 tablet 3    ferrous sulfate, dried (SLOW FE) 160 mg (50 mg iron) TbSR Take 1 tablet (160 mg total) by mouth once daily. (Patient taking differently: Take 160 mg by mouth every other day.) 90 tablet 3    gabapentin (NEURONTIN) 100 MG capsule Take 1 capsule (100 mg total) by mouth 2 (two) times daily. Take one 100mg capsule twice a day and one 300mg capsule at bedtime 180 capsule 2    gabapentin (NEURONTIN) 300 MG capsule Take 1 capsule (300 mg total) by mouth every evening. 90 capsule 2    gabapentin 5% baclofen 2% amitriptyline 2% topical cream Apply topically 3 (three) times daily. 240 g 2    hydrOXYchloroQUINE (PLAQUENIL) 200 mg tablet Take 1 tablet (200 mg total) by mouth 2 (two) times daily. 180 tablet 11    LIDOcaine (LIDODERM) 5 % Place 1 patch onto the skin once daily. Remove & Discard patch within 12 hours or as directed by MD (Patient taking differently: Place 1 patch onto the skin once daily. Remove & Discard patch within 12 hours as needed for pain or as directed by MD) 30 patch 2    magnesium oxide (MAG-OX) 400 mg (241.3 mg magnesium) tablet Take 400 mg by  mouth once daily.      montelukast (SINGULAIR) 10 mg tablet Take 1 tablet (10 mg total) by mouth once daily. 30 tablet 2    mycophenolate (CELLCEPT) 500 mg Tab Take 1 tablet (500 mg total) by mouth 2 (two) times daily. 180 tablet 3    pantoprazole (PROTONIX) 40 MG tablet Take 1 tablet (40 mg total) by mouth once daily. 30 tablet 5    sulfamethoxazole-trimethoprim 800-160mg (BACTRIM DS) 800-160 mg Tab One tablet by mouth every Monday, Wednesday, Friday to prevent lung infection 36 tablet 3    thiamine 100 MG tablet Take 100 mg by mouth once daily.      traZODone (DESYREL) 50 MG tablet Take 1 tablet (50 mg total) by mouth every evening. (Patient taking differently: Take 50 mg by mouth nightly as needed for Insomnia.) 30 tablet 2    [DISCONTINUED] calcium carbonate (TUMS) 200 mg calcium (500 mg) chewable tablet Take 2 tablets (1,000 mg total) by mouth once daily. (Patient not taking: No sig reported) 60 tablet 11    [DISCONTINUED] famotidine (PEPCID) 20 MG tablet Take 1 tablet (20 mg total) by mouth 2 (two) times daily. (Patient not taking: Reported on 6/15/2022) 60 tablet 11    [DISCONTINUED] lacosamide (VIMPAT) 10 mg/mL Soln oral solution Take 10 mLs (100 mg total) by mouth every 12 (twelve) hours. (Patient not taking: No sig reported) 600 mL 11       As per HPI and below:  Past Medical History:   Diagnosis Date    Acute cystitis without hematuria 2/27/2020    Acute hypoxemic respiratory failure 4/11/2018    Acute respiratory failure with hypoxia 3/2/2020    KERMIT (acute kidney injury) 3/13/2019    Altered mental status     Anemia     Arthritis     Bilateral hand pain 3/14/2018    Branch retinal vein occlusion, left eye 2/20/2015    Chronic bilateral low back pain without sciatica 3/23/2017    Chronic renal failure in pediatric patient, stage 3 (moderate) 4/15/2018    Chronic renal failure syndrome, stage 3 (moderate) 4/15/2018    Chronic systolic (congestive) heart failure 8/6/2021    Last Assessment & Plan:   Formatting of this note might be different from the original. 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 Hospitalization late 2021, w/ rehab to follow (no notes available) --12/13/2021-CT head revealed thin extra-axial hyperdense collection overlying the right cerebral convexity compatible with acute subdural hematoma, neurosurgery was consulted, patient was noted with Keppra 500 mg b.i.d., apixaban was held -12/19/    Iron deficiency anemia secondary to inadequate dietary iron intake 6/24/2013    Joint pain     Keratoconjunctivitis sicca of both eyes not specified as Sjogren's 7/29/2016    Leiomyoma of uterus, unspecified 9/16/2014    Long QT interval 6/29/2016    Due to medication (plaquenil)     Macular edema 1/12/2015    Multinodular goiter 1/12/2016    Neuropathy 8/23/2017    Plaquenil causing adverse effect in therapeutic use 10/7/2016    Pneumococcal vaccine refused 8/17/2016    Pneumonia due to Streptococcus pneumoniae 4/5/2018    Poor nutrition 12/23/2018    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/2015    RA (rheumatoid arthritis) 12/13/2021    -managed by rhematology, since this is a duplicate problem list entry, will archive this      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     Past Surgical History:   Procedure Laterality Date    BREAST CYST EXCISION      CATARACT EXTRACTION      COLONOSCOPY N/A 9/29/2015    Procedure: COLONOSCOPY;  Surgeon: FIDELINA Sanchez MD;  Location: [facility] ENDO (4TH FLR);  Service: Endoscopy;  Laterality: N/A;    EYE SURGERY      INJECTION OF ANESTHETIC AGENT AROUND NERVE Left 4/19/2021    Procedure: BLOCK, NERVE, FEMORAL AND OBTURATOR;  Surgeon: Shivam Gonzalez MD;  Location: [facility] PAIN MGT;  Service: Pain Management;  Laterality: Left;  consent needed    JOINT REPLACEMENT      right knee    KNEE SURGERY Left 12/31/2013    TKR    left parotidectomy      mixed tumor    MO EVAL,SWALLOW FUNCTION,CINE/VIDEO RECORD  6/5/2021         SALIVARY GLAND SURGERY      TONSILLECTOMY      UPPER GASTROINTESTINAL ENDOSCOPY         Social History     Socioeconomic History    Marital status:    Tobacco Use    Smoking status: Never     Passive exposure: Never    Smokeless tobacco: Never   Substance and Sexual Activity    Alcohol use: Yes     Alcohol/week: 0.0 standard drinks     Comment: very seldom     Drug use: No    Sexual activity: Not Currently     Comment: ,  age 52,    Other Topics Concern    Are you pregnant or think you may be? No    Breast-feeding No   Social History Narrative    ** Merged History Encounter **          Social Determinants of Health     Financial Resource Strain: Low Risk     Difficulty of Paying Living Expenses: Not hard at all   Food Insecurity: No Food Insecurity    Worried About Running Out of Food in the Last Year: Never true    Ran Out of Food in the  Last Year: Never true   Transportation Needs: No Transportation Needs    Lack of Transportation (Medical): No    Lack of Transportation (Non-Medical): No   Physical Activity: Inactive    Days of Exercise per Week: 0 days    Minutes of Exercise per Session: 30 min   Stress: No Stress Concern Present    Feeling of Stress : Only a little   Social Connections: Unknown    Frequency of Communication with Friends and Family: More than three times a week    Frequency of Social Gatherings with Friends and Family: Once a week    Active Member of Clubs or Organizations: Yes    Attends Club or Organization Meetings: More than 4 times per year    Marital Status:    Housing Stability: Low Risk     Unable to Pay for Housing in the Last Year: No    Number of Places Lived in the Last Year: 1    Unstable Housing in the Last Year: No       Family History   Problem Relation Age of Onset    Hypertension Mother     Heart disease Mother     Hyperthyroidism Mother     Prostate cancer Father         prostate cancer    Cancer Father     Breast cancer Maternal Grandmother     Hyperthyroidism Other     Colon cancer Neg Hx        Physical Exam     Vitals:    05/12/23 1830 05/12/23 1902 05/12/23 2015 05/12/23 2244   BP: (!) 166/73 (!) 166/72  (!) 157/82   BP Location:       Patient Position:       Pulse: 92 93  90   Resp: 20 20  16   Temp:   99.5 °F (37.5 °C) 98.7 °F (37.1 °C)   TempSrc:   Rectal Oral   SpO2: 96% 97%  100%   Weight:         Physical Exam:   Nursing note and vitals reviewed.  Appearance:  Somnolent, elderly female in no acute respiratory distress.  Making purposeful movements.  Speaking in full sentences when agitated.  Skin: No obvious rashes seen.  Good turgor.  No abrasions.  No ecchymoses.  Eyes: No conjunctival injection. EOMI, PERRL.  Head: NC/AT  ENT: Oropharynx clear.    Chest: CTAB. No increased work of breathing, bilateral chest rise.  Cardiovascular: Regular rate and rhythm.  Normal equal bilateral radial  pulses.  Abdomen: Soft.  Not distended.  Nontender.  No guarding.  No rebound. No Masses.  Musculoskeletal: Good range of motion all joints.  No deformities.  Neck supple, full range of motion, no obvious deformity.  Neurologic: Moves all extremities.  No facial droop.  Normal speech.    Mental Status:  Alert and oriented x person, not time and play.        Results and Medications    Procedures    Labs Reviewed   CBC W/ AUTO DIFFERENTIAL - Abnormal; Notable for the following components:       Result Value    Hemoglobin 11.2 (*)     MCH 26.9 (*)     MCHC 29.4 (*)     RDW 16.1 (*)     Platelets 86 (*)     Mono # 4.1 (*)     Gran % 35.0 (*)     Mono % 44.8 (*)     Platelet Estimate Decreased (*)     All other components within normal limits   COMPREHENSIVE METABOLIC PANEL - Abnormal; Notable for the following components:    Sodium 132 (*)     BUN 24 (*)     Creatinine 1.6 (*)     Albumin 3.4 (*)     Total Bilirubin 1.3 (*)     ALT 7 (*)     Anion Gap 7 (*)     eGFR 31.4 (*)     All other components within normal limits   URINALYSIS, REFLEX TO URINE CULTURE - Abnormal; Notable for the following components:    Protein, UA Trace (*)     All other components within normal limits    Narrative:     Specimen Source->Urine   ACETAMINOPHEN LEVEL - Abnormal; Notable for the following components:    Acetaminophen (Tylenol), Serum <3.0 (*)     All other components within normal limits   SALICYLATE LEVEL - Abnormal; Notable for the following components:    Salicylate Lvl <5.0 (*)     All other components within normal limits   ISTAT PROCEDURE - Abnormal; Notable for the following components:    POC PCO2 46.4 (*)     POC PO2 24 (*)     POC SATURATED O2 41 (*)     All other components within normal limits   CULTURE, BLOOD    Narrative:     Aerobic and anaerobic   CULTURE, BLOOD    Narrative:     Aerobic and anaerobic   TROPONIN I   TOXICOLOGY SCREEN, URINE, RANDOM (COMPLIANCE)    Narrative:     Specimen Source->Urine   TSH    SARS-COV2 (COVID) WITH FLU/RSV BY PCR   POCT GLUCOSE   ISTAT LACTATE   POCT GLUCOSE MONITORING CONTINUOUS       Imaging Results              MRI Brain Without Contrast (Final result)  Result time 05/12/23 23:59:17      Final result by Flo Scott MD (05/12/23 23:59:17)                   Impression:      No acute intracranial process.    Extensive old lacunar type infarctions as above.    Changes of chronic vessel ischemic disease and cerebral volume loss.      Electronically signed by: Flo Scott MD  Date:    05/12/2023  Time:    23:59               Narrative:    EXAMINATION:  MRI BRAIN WITHOUT CONTRAST    CLINICAL HISTORY:  Mental status change, unknown cause;    TECHNIQUE:  Multiplanar multisequence MR imaging of the brain was performed without contrast.    COMPARISON:  CT head dated 05/12/2023.    MRI dated 06/19/2022.    FINDINGS:  The craniocervical junction is intact.  The sellar and parasellar structures are unremarkable.  The intracranial flow voids are within normal limits.    No diffusion-weighted signal abnormality is identified.  The ventricles and sulci are prominent, consistent cerebral volume loss.  There is mild distention of the ventricular system.  There are extensive T2/FLAIR signal hyperintensities within the periventricular and subcortical white matter.  There are extensive old lacunar type infarctions within the basal ganglia canal periventricular white matter, tatyana, and in the cerebellum.  There is no evidence of intracranial hemorrhage.  There is no evidence of mass effect.    There are postoperative changes in the globes.  There is mucosal thickening within the ethmoid and maxillary sinuses.  The mastoid air cells are clear.                                       X-Ray Chest AP Portable (Final result)  Result time 05/12/23 17:38:40      Final result by Ben Christianson MD (05/12/23 17:38:40)                   Impression:      As above      Electronically signed by: Ben Christianson  MD  Date:    05/12/2023  Time:    17:38               Narrative:    EXAMINATION:  XR CHEST AP PORTABLE    CLINICAL HISTORY:  Sepsis;    TECHNIQUE:  Single frontal view of the chest was performed.    COMPARISON:  02/22/2023    FINDINGS:  The cardiomediastinal silhouette is prominent, similar to the previous exam noting calcification of the aorta.  There is prominence of the upper mediastinum, may reflect vascular structures, stable..  There is no pleural effusion.  The trachea is midline.  The lungs are symmetrically expanded bilaterally with patchy increased interstitial and parenchymal attenuation bilaterally, worsened since the previous exam suggesting worsening edema or infection..  There is no pneumothorax.  The osseous structures are remarkable for degenerative change..                                       CT Head Without Contrast (Final result)  Result time 05/12/23 16:49:23      Final result by Carlos Bull MD (05/12/23 16:49:23)                   Impression:      No acute intracranial process.      Electronically signed by: Carlos Bull  Date:    05/12/2023  Time:    16:49               Narrative:    EXAMINATION:  CT HEAD WITHOUT CONTRAST    CLINICAL HISTORY:  Mental status change, unknown cause;    TECHNIQUE:  Low dose axial images were obtained through the head.  Coronal and sagittal reformations were also performed. Contrast was not administered.    COMPARISON:  10/11/2022    FINDINGS:  Limited by motion.    There is no evidence of hydrocephalus with mild volume loss, stable.  No mass effect midline shift intracranial hemorrhage or acute territorial infarct.    Chronic small right cerebellar infarct and chronic deep white matter infarct on the right again identified.  Decreased attenuation within the periventricular and subcortical white matter nonspecific but may reflect moderate chronic small vessel ischemic change, stable.    No evidence of calvarial fracture.  Lucency in the left sphenoid wing  presumably reflects cephalocele/meningocele formation, similar from multiple prior studies.  Prominent atherosclerotic vascular calcifications at the skull base.    The visualized sinuses and mastoid air cells are clear.                                          Medications   lactated ringers bolus 2,055 mL (0 mLs Intravenous Stopped 5/12/23 2349)   vancomycin (VANCOCIN) 1,000 mg in dextrose 5 % (D5W) 250 mL IVPB (Vial-Mate) (0 mg Intravenous Stopped 5/12/23 2055)   ceFEPIme (MAXIPIME) 1 g in dextrose 5 % in water (D5W) 5 % 50 mL IVPB (MB+) (0 g Intravenous Stopped 5/12/23 1903)       MDM, Impression and Plan   Previous Records:   I decided to obtain old medical records which is listed here or in ED course:     Independently Interpreted Test(s):   EKG:  I independently reviewed and interpreted the EKG and my findings are as follows:   Detailed here or in ED course.  EKG shows sinus rhythm with occasional PVCs and a ventricular rate of 88 beats per minute.  Narrow QRS.  No ST segment elevations depressions.  No STEMI  IMAGING:  I have ordered and independently interpreted X-rays and my findings are as follow:  Detailed here or in ED course.  Chest x-ray with no consolidative pneumonia, pneumothorax or pleural effusion.    Clinical Tests:   Lab Tests: Ordered and Reviewed  Radiological Study: Ordered and Reviewed  Medical Tests: Ordered and Reviewed    Initial Assessment/ED Management:    Urgent evaluation of 87 y.o. female with history as above Who presents the ED with chief complaint of generalized fatigue, concern for UTI acute encephalopathy.  On arrival to the ED she is noted to be afebrile, hemodynamically stable in no acute respiratory distress.  Glucose noted to be within normal limits.  CT head obtained.  Presentation does not seem to be consistent with a stroke as she is globally encephalopathy and has no acute focal neurologic deficits.    Differential diagnosis:  ICH versus electrolyte abnormalities   -UTI    -COVID  -unlikely ACS   -influenza    Patient's vitals remained stable while under my care.  EKG and troponin making ACS unlikely.  She did not have a rectal temperature.  Urinalysis without signs of UTI.  Urine tox unremarkable.  CMP with no large gross abnormalities.  Hemoglobin near baseline no leukocytosis.  Blood cultures were drawn due to concern for occult bacteremia and she was given broad-spectrum antibiotics due to concern for UTI versus aspiration pneumonia.  Her lactic acid was noted to be within normal limits.  Her CT head showed no acute intracranial bleeds and due to concern for CVA as she is not return to her baseline an MRI brain was ordered and pending at time of sign-out.  Patient was signed out to oncoming ED team pending admission to Hospital Medicine, my brain without contrast.  Family and patient updated on plans for admission.  Patient deemed stable for admission to hospital medicine    I called and discussed the case with:  Hospital Medicine    This patient does have evidence of infective focus  My overall impression is  acute encephalopathy with unclear source .  Source: Unknown  Antibiotics given-   Antibiotics (72h ago, onward)      None          Latest lactate reviewed-  No results for input(s): LACTATE in the last 72 hours.  Organ dysfunction indicated by Encephalopathy    Fluid challenge Ideal Body Weight- The patient's ideal body weight is Ideal body weight: 54.7 kg (120 lb 9.5 oz) which will be used to calculate fluid bolus of 30 ml/kg for treatment of septic shock.      Post- resuscitation assessment Yes Perfusion exam was performed within 6 hours of septic shock presentation after bolus shows Adequate tissue perfusion assessed by non-invasive monitoring       Will Not start Pressors- Levophed for MAP of 65        Please see ED course for discussion of workup and dispo if not listed above.          Final diagnoses:  [I95.9] Hypotension  [G93.40] Acute encephalopathy (Primary)         ED Disposition Condition    Observation Stable               ED Course as of 05/13/23 0121   Fri May 12, 2023   1603 Echo on 12/16/2022  Shows   ? The left ventricle is normal in size with concentric hypertrophy and normal systolic function. The estimated ejection fraction is 65%.  ? Normal right ventricular size with normal right ventricular systolic function.  ? Grade II left ventricular diastolic dysfunction.  ? Severe left atrial enlargement.  ? Mild-to-moderate mitral regurgitation.  ? The estimated PA systolic pressure is 43 mmHg.  ? Normal central venous pressure (3 mmHg).   []   1630 POCT Glucose: 75 []   1706 Family notes l. Sided weakness from previous stroke in June. Family L. Sided on 5/10 as she could not transfer herself. They noted that she was nausea and had bout of vomiting. Yesterday she was more alert and had regain strength to move herself. They noted concern for UTI and wanted to start antibiotics.  []      ED Course User Index  [HM] MD Melida Benavides MD   Emergency Resident   [phone] (spectra)         Melida Benavides MD  Resident  05/13/23 0121

## 2023-05-12 NOTE — PROGRESS NOTES
[facility]    Subjective:      Patient ID: Selma Lux is a 87 y.o. female.    Chief Complaint: No chief complaint on file.    Daughters are with her    Patient seen urgently today for weakness X3 days. She does not want to go to ED. Was evaluated by hospice on Wed night, but had improved by then. Today to has returned to her original complaint of weakness.    12:45PM Patient vomiting during exam. IM Phenergran given.    1:03PM vitals taken. Patient is hypOtensive. Family requesting ED transfer. IV started.    Past Surgical History:   Procedure Laterality Date    BREAST CYST EXCISION      CATARACT EXTRACTION      COLONOSCOPY N/A 9/29/2015    Procedure: COLONOSCOPY;  Surgeon: FIDELINA Sanchez MD;  [facility] ([address] [place] [address]);  Service: Endoscopy;  Laterality: N/A;    EYE SURGERY      INJECTION OF ANESTHETIC AGENT AROUND NERVE Left 4/19/2021    Procedure: BLOCK, NERVE, FEMORAL AND OBTURATOR;  Surgeon: Shivam Gonzalez MD;  Location: [facility];  Service: Pain Management;  Laterality: Left;  consent needed    JOINT REPLACEMENT      right knee    KNEE SURGERY Left 12/31/2013    TKR    left parotidectomy      mixed tumor    OR EVAL,SWALLOW FUNCTION,CINE/VIDEO RECORD  6/5/2021         SALIVARY GLAND SURGERY      TONSILLECTOMY      UPPER GASTROINTESTINAL ENDOSCOPY         Past Medical History:   Diagnosis Date    Acute cystitis without hematuria 2/27/2020    Acute hypoxemic respiratory failure 4/11/2018    Acute respiratory failure with hypoxia 3/2/2020    KERMIT (acute kidney injury) 3/13/2019    Altered mental status     Anemia     Arthritis     Bilateral hand pain 3/14/2018    Branch retinal vein occlusion, left eye 2/20/2015    Chronic bilateral low back pain without sciatica 3/23/2017    Chronic renal failure in pediatric patient, stage 3 (moderate) 4/15/2018    Chronic renal failure syndrome, stage 3 (moderate) 4/15/2018    Chronic systolic (congestive) heart failure  8/6/2021    Last Assessment & Plan:  Formatting of this note might be different from the original. 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 Hospitalization late 2021, w/ rehab to follow (no notes available) --12/13/2021-CT head revealed thin extra-axial hyperdense collection overlying the right cerebral convexity compatible with acute subdural hematoma, neurosurgery was consulted, patient was noted with Keppra 500 mg b.i.d., apixaban was held -12/19/    Iron deficiency anemia secondary to inadequate dietary iron intake 6/24/2013    Joint pain     Keratoconjunctivitis sicca of both eyes not specified as Sjogren's 7/29/2016    Leiomyoma of uterus, unspecified 9/16/2014    Long QT interval 6/29/2016    Due to medication (plaquenil)     Macular edema 1/12/2015    Multinodular goiter 1/12/2016    Neuropathy 8/23/2017    Plaquenil causing adverse effect in therapeutic use 10/7/2016    Pneumococcal vaccine refused 8/17/2016    Pneumonia due to Streptococcus pneumoniae 4/5/2018    Poor nutrition 12/23/2018    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/2015    RA (rheumatoid arthritis) 12/13/2021    -managed by rhematology, since this is a duplicate problem  list entry, will archive this     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       Social History     Socioeconomic History    Marital status:    Tobacco Use    Smoking status: Never     Passive exposure: Never    Smokeless tobacco: Never   Substance and Sexual Activity    Alcohol use: Yes     Alcohol/week: 0.0 standard drinks     Comment: very seldom     Drug use: No    Sexual activity: Not Currently     Comment: ,  age 52,    Other Topics Concern    Are you pregnant or think you may be? No    Breast-feeding No   Social History Narrative    ** Merged History Encounter **          Social Determinants of Health     Financial Resource Strain: Low Risk     Difficulty of Paying Living Expenses: Not hard at all   Food Insecurity: No Food Insecurity    Worried About Running Out of Food in the Last Year: Never true    Ran Out of Food in the Last Year: Never true   Transportation Needs: No Transportation Needs    Lack of Transportation (Medical): No    Lack of Transportation (Non-Medical): No   Physical Activity: Inactive    Days of Exercise per Week: 0 days    Minutes of Exercise per Session: 30 min   Stress: No Stress Concern Present    Feeling of Stress : Only a little   Social Connections: Unknown    Frequency of Communication with Friends and Family: More than three times a week    Frequency of Social Gatherings with Friends and Family: Once a week    Active Member of Clubs or Organizations: Yes    Attends Club or Organization Meetings: More than 4 times per year    Marital Status:    Housing Stability: Low Risk     Unable to Pay for Housing in the  Last Year: No    Number of Places Lived in the Last Year: 1    Unstable Housing in the Last Year: No       Review of Systems    Objective:   LMP  (LMP Unknown)     Physical Exam    Lab Results   Component Value Date    WBC 10.83 01/11/2023    HGB 9.8 (L) 01/11/2023    HCT 27 (L) 01/11/2023     (L) 01/11/2023    CHOL 120 06/19/2022    TRIG 50 06/19/2022    HDL 65 06/19/2022    ALT 12 02/22/2023    AST 11 02/22/2023     02/22/2023    K 3.7 02/22/2023     02/22/2023    CREATININE 1.0 02/22/2023    BUN 18 02/22/2023    CO2 23 02/22/2023    TSH 0.572 10/11/2022    INR 1.1 06/20/2022    HGBA1C 5.3 06/20/2022       RESULTS: Reviewed labs and images today    Assessment:   87 y.o. female with multiple co-morbid illnesses here to continue work-up of chronic issues notably ***.     Plan:     Problem List Items Addressed This Visit          Other    Generalized weakness - Primary    Relevant Orders    Comprehensive Metabolic Panel    CBC Auto Differential    Sedimentation rate    Procalcitonin    CT Head Without Contrast    Urinalysis    Urine culture    POCT URINE DIPSTICK WITHOUT MICROSCOPE       Health Maintenance         Date Due Completion Date    Shingles Vaccine (1 of 2) Never done ---    Pneumococcal Vaccines (Age 65+) (2 - PPSV23 if available, else PCV20) 05/02/2018 3/7/2018    DEXA Scan 06/04/2023 6/4/2019    Influenza Vaccine (Season Ended) 09/01/2023 2/5/2020 (Declined)    Override on 2/5/2020: Declined    Lipid Panel 06/19/2027 6/19/2022    TETANUS VACCINE 03/07/2028 3/7/2018            Follow up if symptoms worsen or fail to improve. . 60min spent with patient today, issues addressed include:***    Génesis Rosario MD/MPH  Internal Medicine  [facility]  [phone]

## 2023-05-13 PROBLEM — I27.23 PULMONARY HYPERTENSION DUE TO INTERSTITIAL LUNG DISEASE: Chronic | Status: ACTIVE | Noted: 2019-03-14

## 2023-05-13 PROBLEM — I69.154 HEMIPLEGIA AND HEMIPARESIS FOLLOWING NONTRAUMATIC INTRACEREBRAL HEMORRHAGE AFFECTING LEFT NON-DOMINANT SIDE: Chronic | Status: ACTIVE | Noted: 2021-08-03

## 2023-05-13 PROBLEM — I48.0 AF (PAROXYSMAL ATRIAL FIBRILLATION): Chronic | Status: ACTIVE | Noted: 2018-06-29

## 2023-05-13 PROBLEM — R40.0 SOMNOLENCE: Status: ACTIVE | Noted: 2023-01-01

## 2023-05-13 PROBLEM — D69.6 THROMBOCYTOPENIA: Chronic | Status: ACTIVE | Noted: 2018-12-16

## 2023-05-13 PROBLEM — J84.9 PULMONARY HYPERTENSION DUE TO INTERSTITIAL LUNG DISEASE: Chronic | Status: ACTIVE | Noted: 2019-03-14

## 2023-05-13 PROBLEM — J44.9 COPD (CHRONIC OBSTRUCTIVE PULMONARY DISEASE): Chronic | Status: ACTIVE | Noted: 2022-06-20

## 2023-05-13 PROBLEM — E78.5 HYPERLIPIDEMIA: Chronic | Status: ACTIVE | Noted: 2019-10-17

## 2023-05-13 PROBLEM — F01.A0 MILD VASCULAR DEMENTIA WITHOUT BEHAVIORAL DISTURBANCE, PSYCHOTIC DISTURBANCE, MOOD DISTURBANCE, OR ANXIETY: Chronic | Status: ACTIVE | Noted: 2023-01-10

## 2023-05-13 PROBLEM — R40.0 SOMNOLENCE: Status: RESOLVED | Noted: 2023-01-01 | Resolved: 2023-01-01

## 2023-05-13 PROBLEM — R54 AGE-RELATED PHYSICAL DEBILITY: Chronic | Status: ACTIVE | Noted: 2021-12-13

## 2023-05-13 NOTE — ASSESSMENT & PLAN NOTE
Dx in 2012. On hydroxychloroquine, cellcept, prednisone. Had previously tried humira in 2018 followed by ICU admission for pneumonia    - continued hydroxychloroquine and prednisone  - held cellcept for now given some concern for opportunistic infection

## 2023-05-13 NOTE — ASSESSMENT & PLAN NOTE
CVA in June 2022, with post stroke rehab. Still has residual weakness, but feels it is worse on L side that her baseline

## 2023-05-13 NOTE — DISCHARGE SUMMARY
[facility] - Cardiology [facility] Medicine  Discharge Summary      Patient Name: Selma Lux  MRN: [id number]  JAZZMINE: [id number]  Patient Class: OP- Observation  Admission Date: 5/12/2023  Hospital Length of Stay: 0 days  Discharge Date and Time:  05/13/2023 2:53 PM  Attending Physician: David La MD   Discharging Provider: Nanette Ojeda DO  Primary Care Provider: Génesis Rosario MD  Hospital Medicine Team: [facility] MED 1 Nanette Ojeda DO  Primary Care Team: [facility] 1    HPI:   87 y.o. female with history of subdural hematoma, drug-induced adrenal insufficiency, thyroid abnormalities, hypertension, previous stroke with residual hemiplegia, COPD, Hashimoto's, cryptogenic organizing pneumonia, RA (on plaquenil and cellcept), hip OA, paroxysmal Afib, vascular dementia, iron deficiency anemia, HTN, HLD who presents to the ED with left sided weakness times 3-4 days, general malaise, poor appetite, and nausea/vomiting. She was brought in from PCP clinic (Dr. Rosario) where she vomited, was given phenergan, IV attempted for fluid resuscitation, and became hypotensive to 95/60 prior to being brought to the ED.     At time of my evaluation, she was fatigued and dosing off, but arousable. Oriented to self, date, birthday, president, and place. Denied ongoing nausea or vomiting since being in clinic. Denies fever or chills. She and her daughters state that she has been eating, but less than before and with decreased UOP. Normally she is in a wheelchair at baseline, but has been struggling to transfer on her own which can usually do. She received vanc/zosyn and 2 L LR in the ED. Workup was significant for sodium 132, plt 86, Cr 1.6, BUN 24. UDS, troponin, TSH, UA, procal, ESR, lactate were essentially normal. Covid negative.       * No surgery found *      Hospital Course:   Patient admitted to hospital medicine for evaluation and management of acute encephalopathy worsening since Wednesday,  5/10. Likely due to dehydration, hypoglycemia, and hyponatremia in setting of poor oral intake. Admitted with KERMIT (Cr 1.6) and Na 132, which returned to baseline after receiving 30mg/kg LR. Glucose as low as 44, repleted with IV dextrose. Also discussed possible contribution of polypharmacy with family. She takes baclofen twice daily and gabapentin nightly as well as prn for chronic muscle stiffness and tremors. Recommended decreasing medications to only use at night. Discussed some possibility of CVA recrudescence in setting of metabolic insult. Concern for infection low given her normal WBC, procal, lactate, UA, resp viral panel, and blood cultures, in addition to hemodynamic stability and afebrile. Tbili mildly elevated 1.3, RUQ US ordered out of abundance of caution in otherwise immunosuppressed individual (and Tbili 1.3), was unremarkable. Shortly after admission, patient returned to baseline mentation. She is stable for discharge to home with close follow up with her PCP.       Goals of Care Treatment Preferences:  Code Status: Full Code    Living Will: Yes     What is most important right now is to focus on spending time at home, avoiding the hospital, symptom/pain control.  Accordingly, we have decided that the best plan to meet the patient's goals includes continuing with treatment.      Consults:     No new Assessment & Plan notes have been filed under this hospital service since the last note was generated.  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 [I69.154] 08/03/2021 Not Applicable     Chronic    Hyperlipidemia [E78.5]  10/17/2019 Yes     Chronic    Pulmonary hypertension due to interstitial lung disease [I27.23, J84.9] 03/14/2019 Yes     Chronic    KERMIT (acute kidney injury) [N17.9] 03/13/2019 Yes    Thrombocytopenia [D69.6] 12/16/2018 Yes     Chronic    AF (paroxysmal atrial fibrillation) [I48.0] 06/29/2018 Yes     Chronic    Hypertension, essential [I10] 06/16/2016 Yes     Chronic    Rheumatoid arthritis of multiple sites [M05.79] 10/21/2015 Yes     Chronic    PUD (peptic ulcer disease) [K27.9] 03/11/2014 Yes     Chronic    Iron deficiency anemia secondary to inadequate dietary iron intake [D50.8] 06/24/2013 Yes     Chronic      Problems Resolved During this Admission:    Diagnosis Date Noted Date Resolved POA    Somnolence [R40.0] 05/13/2023 05/13/2023 Unknown       Discharged Condition: stable    Disposition: Home or Self Care    Follow Up:   Follow-up Information     Génesis Rosario MD Follow up.    Specialty: Internal Medicine  Contact information:  [address]  [place] [address] [phone]                       Patient Instructions:   No discharge procedures on file.    Significant Diagnostic Studies: Labs:   BMP:   Recent Labs   Lab 05/12/23  1249 05/12/23 1838 05/13/23 0623   GLU 99 71 44*   * 132* 135*   K 4.7 4.8 4.5    101 104   CO2 25 24 20*   BUN 24* 24* 18   CREATININE 1.7* 1.6* 1.1   CALCIUM 8.7 8.8 8.7   MG  --   --  1.6    and CBC   Recent Labs   Lab 05/12/23  1249 05/12/23 1838 05/13/23 0623   WBC 8.52 9.09 8.22   HGB 11.0* 11.2* 10.5*   HCT 37.2 38.1 34.7*   PLT 77* 86* 86*       Pending Diagnostic Studies:     Procedure Component Value Units Date/Time    Fungitell Assay For (1.3)-B-D-Glucans [[id number]] Collected: 05/13/23 0623    Order Status: Sent Lab Status: In process Updated: 05/13/23 0724    Specimen: Blood     Legionella antigen, urine [[id number]] Collected: 05/13/23 1058    Order Status: Sent Lab Status: In process Updated: 05/13/23 1059    Specimen: Urine,  Clean Catch          Medications:  Reconciled Home Medications:      Medication List      START taking these medications    predniSONE 10 MG tablet  Commonly known as: DELTASONE  Take 1 tablet (10 mg total) by mouth once daily.  Start taking on: May 14, 2023        CHANGE how you take these medications    amLODIPine 5 MG tablet  Commonly known as: NORVASC  Take 1 tablet (5 mg total) by mouth once daily.  What changed:   · medication strength  · how much to take  · when to take this     atorvastatin 40 MG tablet  Commonly known as: LIPITOR  TAKE 1 TABLET(40 MG) BY MOUTH EVERY DAY  What changed: when to take this     baclofen 10 MG tablet  Commonly known as: LIORESAL  Take 1 tablet (10 mg total) by mouth nightly as needed (muscle spasms).  What changed: See the new instructions.        CONTINUE taking these medications    acetaminophen 500 MG tablet  Commonly known as: TYLENOL  Take 2 tablets (1,000 mg total) by mouth 2 (two) times daily as needed (Headache).     albuterol-ipratropium 2.5 mg-0.5 mg/3 mL nebulizer solution  Commonly known as: DUO-NEB  Take 3 mLs by nebulization 2 (two) times daily as needed for Shortness of Breath. Rescue     apixaban 2.5 mg Tab  Commonly known as: ELIQUIS  Take 1 tablet (2.5 mg total) by mouth 2 (two) times daily.     carvediloL 3.125 MG tablet  Commonly known as: COREG  Take 1 tablet (3.125 mg total) by mouth 2 (two) times daily with meals.     ferrous sulfate, dried 160 mg (50 mg iron) Tbsr  Commonly known as: SLOW FE  Take 1 tablet (160 mg total) by mouth every other day.     * gabapentin 100 MG capsule  Commonly known as: NEURONTIN  Take 1 capsule (100 mg total) by mouth 2 (two) times daily. Take one 100mg capsule twice a day and one 300mg capsule at bedtime     * gabapentin 300 MG capsule  Commonly known as: NEURONTIN  Take 1 capsule (300 mg total) by mouth every evening.     gabapentin 5% baclofen 2% amitriptyline 2% topical cream  Apply topically 3 (three) times daily.      hydrOXYchloroQUINE 200 mg tablet  Commonly known as: PLAQUENIL  Take 1 tablet (200 mg total) by mouth 2 (two) times daily.     LIDOcaine 5 %  Commonly known as: LIDODERM  Place 1 patch onto the skin once daily. Remove & Discard patch within 12 hours as needed for pain or as directed by MD     magnesium oxide 400 mg (241.3 mg magnesium) tablet  Commonly known as: MAG-OX  Take 400 mg by mouth once daily.     montelukast 10 mg tablet  Commonly known as: SINGULAIR  Take 1 tablet (10 mg total) by mouth once daily.     mycophenolate 500 mg Tab  Commonly known as: CELLCEPT  Take 1 tablet (500 mg total) by mouth 2 (two) times daily.     pantoprazole 40 MG tablet  Commonly known as: PROTONIX  Take 1 tablet (40 mg total) by mouth once daily.     sulfamethoxazole-trimethoprim 800-160mg 800-160 mg Tab  Commonly known as: BACTRIM DS  One tablet by mouth every Monday, Wednesday, Friday to prevent lung infection     thiamine 100 MG tablet  Take 100 mg by mouth once daily.     traZODone 50 MG tablet  Commonly known as: DESYREL  Take 1 tablet (50 mg total) by mouth nightly as needed for Insomnia.         * This list has 2 medication(s) that are the same as other medications prescribed for you. Read the directions carefully, and ask your doctor or other care provider to review them with you.                Indwelling Lines/Drains at time of discharge:   Lines/Drains/Airways     None                 Time spent on the discharge of patient: 45 minutes         Nanette Ojeda DO PGY1  Department of Hospital Medicine  [facility] - Cardiology Stepdown

## 2023-05-13 NOTE — ASSESSMENT & PLAN NOTE
Past echos have shown elevated PA pressures. Follows with pulm and cards. Does not require oxygen at baseline

## 2023-05-13 NOTE — PLAN OF CARE
Problem: Adult Inpatient Plan of Care  Goal: Patient-Specific Goal (Individualized)  Outcome: Ongoing, Progressing     Problem: Altered Behavior (Delirium)  Goal: Improved Behavioral Control  Outcome: Ongoing, Progressing     Problem: Fall Injury Risk  Goal: Absence of Fall and Fall-Related Injury  Outcome: Ongoing, Progressing     Plan of care discussed with patient and family/caregiver. Patient is free of fall/trauma/injury. Family remains at bedside, questions answered. Denies CP, SOB, or pain/discomfort. Mental status improving. Plan to work with therapy tomorrow. Will continue to monitor

## 2023-05-13 NOTE — PLAN OF CARE
05/13/23 1501   Final Note   Assessment Type Final Discharge Note   Anticipated Discharge Disposition Home   What phone number can be called within the next 1-3 days to see how you are doing after discharge? [phone]

## 2023-05-13 NOTE — ASSESSMENT & PLAN NOTE
Patient with Paroxysmal (<7 days) atrial fibrillation which is controlled currently with Beta Blocker. Patient is currently in sinus rhythm.[id number] Score: 8. HASBLED Score:5 . Anticoagulation indicated. Anticoagulation done with eliquis.

## 2023-05-13 NOTE — ASSESSMENT & PLAN NOTE
Patient with acute kidney injury likely due to pre-renal azotemia KERMIT is currently stable. Labs reviewed- Renal function/electrolytes with Estimated Creatinine Clearance: 25.1 mL/min (A) (based on SCr of 1.6 mg/dL (H)). according to latest data. Monitor urine output and serial BMP and adjust therapy as needed. Avoid nephrotoxins and renally dose meds for GFR listed above.     Lab Results   Component Value Date    CREATININE 1.6 (H) 05/12/2023       Plan:   - Check urine lytes and renal ultrasound  - Strict I&Os and daily weights   - Daily BMP  - Trend sCr  - Avoid nephrotoxic agents such as NSAIDs, gadolinium, ACEi, ARBs and IV radiocontrast.  - Renally dose meds to current GFR.  - Maintain MAP > 65.  - No indications for HD at this time.   - Maintain electrolytes at goal: Mg > 2, Phos > 3, K > 4.

## 2023-05-13 NOTE — HPI
87 y.o. female with history of subdural hematoma, drug-induced adrenal insufficiency, thyroid abnormalities, hypertension, previous stroke with residual hemiplegia, COPD, Hashimoto's, cryptogenic organizing pneumonia, RA (on plaquenil and cellcept), hip OA, paroxysmal Afib, vascular dementia, iron deficiency anemia, HTN, HLD who presents to the ED with left sided weakness times 3-4 days, general malaise, poor appetite, and nausea/vomiting. She was brought in from PCP clinic (Dr. Rosario) where she vomited, was given phenergan, IV attempted for fluid resuscitation, and became hypotensive to 95/60 prior to being brought to the ED.     At time of my evaluation, she was fatigued and dosing off, but arousable. Oriented to self, date, birthday, president, and place. Denied ongoing nausea or vomiting since being in clinic. Denies fever or chills. She and her daughters state that she has been eating, but less than before and with decreased UOP. Normally she is in a wheelchair at baseline, but has been struggling to transfer on her own which can usually do. She received vanc/zosyn and 2 L LR in the ED. Workup was significant for sodium 132, plt 86, Cr 1.6, BUN 24. UDS, troponin, TSH, UA, procal, ESR, lactate were essentially normal. Covid negative.

## 2023-05-13 NOTE — ED NOTES
Telemetry Verification   Patient placed on Telemetry Box  Verified with [employer]   Box # [id number]   Rate 96   Rhythm Normal Sinus

## 2023-05-13 NOTE — ED NOTES
Selma Lux, a 87 y.o. female presents to the ED w/ complaint of right sided weakness 3-4days. Became hypotensive and had syncopal episode in clinic    Triage note:  Chief Complaint   Patient presents with    Weakness     Right sided weakness X3-4 days. Pt. Became hypotensive and had syncopal episode at clinic     Review of patient's allergies indicates:  No Known Allergies  Past Medical History:   Diagnosis Date    Acute cystitis without hematuria 2/27/2020    Acute hypoxemic respiratory failure 4/11/2018    Acute respiratory failure with hypoxia 3/2/2020    KERMIT (acute kidney injury) 3/13/2019    Altered mental status     Anemia     Arthritis     Bilateral hand pain 3/14/2018    Branch retinal vein occlusion, left eye 2/20/2015    Chronic bilateral low back pain without sciatica 3/23/2017    Chronic renal failure in pediatric patient, stage 3 (moderate) 4/15/2018    Chronic renal failure syndrome, stage 3 (moderate) 4/15/2018    Chronic systolic (congestive) heart failure 8/6/2021    Last Assessment & Plan:  Formatting of this note might be different from the original. 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 Hospitalization late 2021, w/ rehab to follow  (no notes available) --12/13/2021-CT head revealed thin extra-axial hyperdense collection overlying the right cerebral convexity compatible with acute subdural hematoma, neurosurgery was consulted, patient was noted with Keppra 500 mg b.i.d., apixaban was held -12/19/    Iron deficiency anemia secondary to inadequate dietary iron intake 6/24/2013    Joint pain     Keratoconjunctivitis sicca of both eyes not specified as Sjogren's 7/29/2016    Leiomyoma of uterus, unspecified 9/16/2014    Long QT interval 6/29/2016    Due to medication (plaquenil)     Macular edema 1/12/2015    Multinodular goiter 1/12/2016    Neuropathy 8/23/2017    Plaquenil causing adverse effect in therapeutic use 10/7/2016    Pneumococcal vaccine refused 8/17/2016    Pneumonia due to Streptococcus pneumoniae 4/5/2018    Poor nutrition 12/23/2018    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/2015    RA (rheumatoid arthritis) 12/13/2021    -managed by rhematology, since this is a duplicate problem list entry, will archive this     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

## 2023-05-13 NOTE — ASSESSMENT & PLAN NOTE
Fatigue, worsening weakness, confusion, somnolent with poor PO intake for last few days. Some nausea and vomiting as well. Patient had a few episodes of nausea and vomiting. Metabolic and infectious workup so far largely negative with mild hyponatremia and KERMIT discovered. UA negative for infection. Covid negative. Given that patient is on chronic immunosuppression, may not mount fever and WBC count. CTH and MRI without acute changes. Lethargic and tachycardic, but oriented to self, place, time, and situation. No apparent source of infection at this time. Possibly recrudescence of stroke vs occult infection.     - EEG ordered  - f/u blood cultures, legionella, fungitell, RIP  - IVF if Cr still elevated  - Can start empiric antibiotics if hypotension re-occurs.   - If not improving, consider LP and CT C/A/P  - PT/OT  - consider consulting ID and neuro if not improving

## 2023-05-13 NOTE — NURSING TRANSFER
Nursing Transfer Note      5/13/2023     Reason patient is being transferred: AMS    Transfer from ED    Transfer via stretcher    Transfer with cardiac monitoring    Transported by Transport     Telemetry: Box Number [id number], Rate 104, and Rhythm ST    Medicines sent: none     Any special needs or follow-up needed: none     Chart send with patient: No    Notified: daughter at bedside    Patient reassessed at: 05/13/2023 0240  Upon arrival to floor: cardiac monitor applied, patient oriented to room, call bell in reach, and bed in lowest position

## 2023-05-13 NOTE — ASSESSMENT & PLAN NOTE
Mild hyponatremia of 132.  Likely not the cause of AMS.     - daily CMP  - serum osm, urine osm, urine sodium ordered   - TSH normal, cortisol will likely be suppressed anyway since she's on chronic steroids   - likely from mild KERMIT or poor PO intake

## 2023-05-13 NOTE — H&P
[facility] - Cardiology [facility] Medicine  History & Physical    Patient Name: Selma Lux  MRN: [id number]  Patient Class: OP- Observation  Admission Date: 5/12/2023  Attending Physician: David La MD   Primary Care Provider: Génesis Rosario MD         Patient information was obtained from patient, relative(s), past medical records and ER records.     Subjective:     Principal Problem:Acute encephalopathy    Chief Complaint:   Chief Complaint   Patient presents with    Weakness     Right sided weakness X3-4 days. Pt. Became hypotensive and had syncopal episode at clinic        HPI: 87 y.o. female with history of subdural hematoma, drug-induced adrenal insufficiency, thyroid abnormalities, hypertension, previous stroke with residual hemiplegia, COPD, Hashimoto's, cryptogenic organizing pneumonia, RA (on plaquenil and cellcept), hip OA, paroxysmal Afib, vascular dementia, iron deficiency anemia, HTN, HLD who presents to the ED with left sided weakness times 3-4 days, general malaise, poor appetite, and nausea/vomiting. She was brought in from PCP clinic (Dr. Rosario) where she vomited, was given phenergan, IV attempted for fluid resuscitation, and became hypotensive to 95/60 prior to being brought to the ED.     At time of my evaluation, she was fatigued and dosing off, but arousable. Oriented to self, date, birthday, president, and place. Denied ongoing nausea or vomiting since being in clinic. Denies fever or chills. She and her daughters state that she has been eating, but less than before and with decreased UOP. Normally she is in a wheelchair at baseline, but has been struggling to transfer on her own which can usually do. She received vanc/zosyn and 2 L LR in the ED. Workup was significant for sodium 132, plt 86, Cr 1.6, BUN 24. UDS, troponin, TSH, UA, procal, ESR, lactate were essentially normal. Covid negative.       Past Medical History:   Diagnosis Date    Acute cystitis  without hematuria 2/27/2020    Acute hypoxemic respiratory failure 4/11/2018    Acute respiratory failure with hypoxia 3/2/2020    KERMIT (acute kidney injury) 3/13/2019    Altered mental status     Anemia     Arthritis     Bilateral hand pain 3/14/2018    Branch retinal vein occlusion, left eye 2/20/2015    Chronic bilateral low back pain without sciatica 3/23/2017    Chronic renal failure in pediatric patient, stage 3 (moderate) 4/15/2018    Chronic renal failure syndrome, stage 3 (moderate) 4/15/2018    Chronic systolic (congestive) heart failure 8/6/2021    Last Assessment & Plan:  Formatting of this note might be different from the original. -last ANTOLIN was done on 03/01/2020:  Grade 1 mild left ventricular diastolic dysfunction    Cognitive communication deficit 12/19/2017    COPD exacerbation 1/23/2022    Cystoid macular edema, left eye 2/20/2015    Cystoid macular edema, left eye 2/20/2015    Delirium 12/30/2021    DJD (degenerative joint disease) of cervical spine 5/15/2013    Enterococcal bacteremia     Fatty liver 8/26/2014    Goiter 4/9/2018    Hashimoto's disease     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    History of 2019 novel coronavirus disease (COVID-19) 4/11/2022 2/2022    Hypertension     Hypertensive encephalopathy     IBS (irritable bowel syndrome) 6/21/2017    IGT (impaired glucose tolerance) 1/12/2016    Intracranial subdural hematoma 1/4/2022    Last Assessment & Plan: Formatting of this note might be different from the original. Hospitalization late 2021, w/ rehab to follow (no notes available) --12/13/2021-CT head revealed thin extra-axial hyperdense collection overlying the right cerebral convexity compatible with acute subdural hematoma, neurosurgery was consulted, patient was noted with Keppra 500 mg b.i.d., apixaban was held -12/19/    Iron deficiency anemia secondary to inadequate dietary iron intake 6/24/2013    Joint pain     Keratoconjunctivitis sicca of both eyes not  specified as Sjogren's 7/29/2016    Leiomyoma of uterus, unspecified 9/16/2014    Long QT interval 6/29/2016    Due to medication (plaquenil)     Macular edema 1/12/2015    Multinodular goiter 1/12/2016    Neuropathy 8/23/2017    Plaquenil causing adverse effect in therapeutic use 10/7/2016    Pneumococcal vaccine refused 8/17/2016    Pneumonia due to Streptococcus pneumoniae 4/5/2018    Poor nutrition 12/23/2018    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/2015    RA (rheumatoid arthritis) 12/13/2021    -managed by rhematology, since this is a duplicate problem list entry, will archive this     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    s/p Right Total knee replacement 5/15/2013    Scoliosis of thoracic spine 5/15/2013    Small vessel disease, cerebrovascular 12/28/2017    Stroke     Traumatic subdural hemorrhage with loss of consciousness of unspecified duration, initial encounter 1/10/2023    12/2021    UTI (urinary tract infection) 12/29/2018    Vascular dementia 12/6/2017       Past Surgical History:   Procedure Laterality Date    BREAST CYST EXCISION      CATARACT EXTRACTION      COLONOSCOPY N/A 9/29/2015    Procedure: COLONOSCOPY;  Surgeon: FIDELINA Sanchez MD;  Location: [facility] ENDO ([address] [place] [address]);  Service: Endoscopy;  Laterality: N/A;    EYE SURGERY      INJECTION OF ANESTHETIC AGENT AROUND NERVE Left 4/19/2021    Procedure: BLOCK, NERVE, FEMORAL AND OBTURATOR;  Surgeon: Shivam Gonzalez MD;  Location: [facility];  Service: Pain Management;  Laterality: Left;  consent needed    JOINT REPLACEMENT      right knee    KNEE SURGERY Left 12/31/2013    TKR    left parotidectomy      mixed tumor    NH EVAL,SWALLOW FUNCTION,CINE/VIDEO RECORD  6/5/2021         SALIVARY GLAND SURGERY      TONSILLECTOMY      UPPER GASTROINTESTINAL ENDOSCOPY         Review of patient's allergies indicates:  No Known Allergies    Current Facility-Administered Medications on File Prior to Encounter    Medication    promethazine injection 25 mg    [DISCONTINUED] ondansetron injection 8 mg     Current Outpatient Medications on File Prior to Encounter   Medication Sig    acetaminophen (TYLENOL) 500 MG tablet Take 1 tablet (500 mg total) by mouth every 4 (four) hours as needed for Pain. (Patient taking differently: Take 1,000 mg by mouth 2 (two) times daily as needed (Headache).)    albuterol-ipratropium (DUO-NEB) 2.5 mg-0.5 mg/3 mL nebulizer solution Take 3 mLs by nebulization 2 (two) times daily as needed for Shortness of Breath. Rescue    amLODIPine (NORVASC) 10 MG tablet TAKE 1 TABLET(10 MG) BY MOUTH EVERY DAY (Patient taking differently: Take 5 mg by mouth once daily.)    apixaban (ELIQUIS) 2.5 mg Tab Take 1 tablet (2.5 mg total) by mouth 2 (two) times daily.    atorvastatin (LIPITOR) 40 MG tablet TAKE 1 TABLET(40 MG) BY MOUTH EVERY DAY (Patient taking differently: Take 40 mg by mouth every evening.)    baclofen (LIORESAL) 10 MG tablet TAKE 1/2 TO 1 TABLET(5 TO 10 MG) BY MOUTH THREE TIMES DAILY AS NEEDED    carvediloL (COREG) 3.125 MG tablet Take 1 tablet (3.125 mg total) by mouth 2 (two) times daily with meals.    ferrous sulfate, dried (SLOW FE) 160 mg (50 mg iron) TbSR Take 1 tablet (160 mg total) by mouth once daily. (Patient taking differently: Take 160 mg by mouth every other day.)    gabapentin (NEURONTIN) 100 MG capsule Take 1 capsule (100 mg total) by mouth 2 (two) times daily. Take one 100mg capsule twice a day and one 300mg capsule at bedtime    gabapentin (NEURONTIN) 300 MG capsule Take 1 capsule (300 mg total) by mouth every evening.    gabapentin 5% baclofen 2% amitriptyline 2% topical cream Apply topically 3 (three) times daily.    hydrOXYchloroQUINE (PLAQUENIL) 200 mg tablet Take 1 tablet (200 mg total) by mouth 2 (two) times daily.    LIDOcaine (LIDODERM) 5 % Place 1 patch onto the skin once daily. Remove & Discard patch within 12 hours or as directed by MD (Patient taking  differently: Place 1 patch onto the skin once daily. Remove & Discard patch within 12 hours as needed for pain or as directed by MD)    magnesium oxide (MAG-OX) 400 mg (241.3 mg magnesium) tablet Take 400 mg by mouth once daily.    montelukast (SINGULAIR) 10 mg tablet Take 1 tablet (10 mg total) by mouth once daily.    mycophenolate (CELLCEPT) 500 mg Tab Take 1 tablet (500 mg total) by mouth 2 (two) times daily.    pantoprazole (PROTONIX) 40 MG tablet Take 1 tablet (40 mg total) by mouth once daily.    sulfamethoxazole-trimethoprim 800-160mg (BACTRIM DS) 800-160 mg Tab One tablet by mouth every Monday, Wednesday, Friday to prevent lung infection    thiamine 100 MG tablet Take 100 mg by mouth once daily.    traZODone (DESYREL) 50 MG tablet Take 1 tablet (50 mg total) by mouth every evening. (Patient taking differently: Take 50 mg by mouth nightly as needed for Insomnia.)    [DISCONTINUED] calcium carbonate (TUMS) 200 mg calcium (500 mg) chewable tablet Take 2 tablets (1,000 mg total) by mouth once daily. (Patient not taking: No sig reported)    [DISCONTINUED] famotidine (PEPCID) 20 MG tablet Take 1 tablet (20 mg total) by mouth 2 (two) times daily. (Patient not taking: Reported on 6/15/2022)    [DISCONTINUED] lacosamide (VIMPAT) 10 mg/mL Soln oral solution Take 10 mLs (100 mg total) by mouth every 12 (twelve) hours. (Patient not taking: No sig reported)     Family History       Problem Relation (Age of Onset)    Breast cancer Maternal Grandmother    Cancer Father    Heart disease Mother    Hypertension Mother    Hyperthyroidism Mother, Other    Prostate cancer Father          Tobacco Use    Smoking status: Never     Passive exposure: Never    Smokeless tobacco: Never   Substance and Sexual Activity    Alcohol use: Yes     Alcohol/week: 0.0 standard drinks     Comment: very seldom     Drug use: No    Sexual activity: Not Currently     Comment: ,  age 52,      Review of Systems    Constitutional:  Positive for activity change, appetite change and fatigue. Negative for chills and fever.   Respiratory:  Negative for cough, shortness of breath and wheezing.    Cardiovascular:  Negative for chest pain, palpitations and leg swelling.   Gastrointestinal:  Positive for nausea and vomiting. Negative for abdominal pain, constipation and diarrhea.   Endocrine: Negative for polyuria.   Genitourinary:  Positive for decreased urine volume. Negative for dysuria and hematuria.   Musculoskeletal:  Positive for arthralgias.   Skin:  Negative for rash and wound.   Neurological:  Negative for syncope, speech difficulty and headaches.   Psychiatric/Behavioral:  Negative for agitation, behavioral problems and confusion.    Objective:     Vital Signs (Most Recent):  Temp: 98.7 °F (37.1 °C) (05/12/23 2244)  Pulse: 90 (05/12/23 2244)  Resp: 16 (05/12/23 2244)  BP: (!) 157/82 (05/12/23 2244)  SpO2: 100 % (05/12/23 2244) Vital Signs (24h Range):  Temp:  [97.5 °F (36.4 °C)-99.5 °F (37.5 °C)] 98.7 °F (37.1 °C)  Pulse:  [80-93] 90  Resp:  [16-20] 16  SpO2:  [96 %-100 %] 100 %  BP: ()/(60-88) 157/82     Weight: 68.5 kg (151 lb 0.3 oz)  Body mass index is 25.92 kg/m².     Physical Exam  Constitutional:       General: She is not in acute distress.     Appearance: Normal appearance. She is ill-appearing.   HENT:      Head: Normocephalic and atraumatic.      Nose: Nose normal.      Mouth/Throat:      Mouth: Mucous membranes are moist.   Eyes:      General: No scleral icterus.     Conjunctiva/sclera: Conjunctivae normal.   Cardiovascular:      Rate and Rhythm: Normal rate and regular rhythm.      Pulses: Normal pulses.      Heart sounds: No murmur heard.    No gallop.   Pulmonary:      Effort: Pulmonary effort is normal. No respiratory distress.      Breath sounds: Normal breath sounds. No wheezing or rales.   Abdominal:      General: Abdomen is flat. Bowel sounds are normal. There is no distension.      Palpations:  Abdomen is soft.      Tenderness: There is no abdominal tenderness. There is no guarding.   Musculoskeletal:      Right lower leg: No edema.      Left lower leg: No edema.   Skin:     General: Skin is warm and dry.      Coloration: Skin is not jaundiced.   Neurological:      General: No focal deficit present.      Mental Status: She is alert and oriented to person, place, and time.      Motor: Weakness present.   Psychiatric:         Mood and Affect: Mood normal.         Behavior: Behavior normal.              Significant Labs: All pertinent labs within the past 24 hours have been reviewed.  CBC:   Recent Labs   Lab 05/12/23  1249 05/12/23  1838   WBC 8.52 9.09   HGB 11.0* 11.2*   HCT 37.2 38.1   PLT 77* 86*     CMP:   Recent Labs   Lab 05/12/23  1249 05/12/23  1838   * 132*   K 4.7 4.8    101   CO2 25 24   GLU 99 71   BUN 24* 24*   CREATININE 1.7* 1.6*   CALCIUM 8.7 8.8   PROT 6.1 6.5   ALBUMIN 3.4* 3.4*   BILITOT 1.2* 1.3*   ALKPHOS 58 59   AST 13 15   ALT 6* 7*   ANIONGAP 6* 7*       POCT Glucose:   Recent Labs   Lab 05/12/23  1618   POCTGLUCOSE 75     Troponin:   Recent Labs   Lab 05/12/23  1838   TROPONINI 0.019     TSH:   Recent Labs   Lab 05/12/23  1729   TSH 1.084     Urine Studies:   Recent Labs   Lab 05/12/23  2232   COLORU Yellow   APPEARANCEUA Clear   PHUR 6.0   SPECGRAV 1.010   PROTEINUA Trace*   GLUCUA Negative   KETONESU Negative   BILIRUBINUA Negative   OCCULTUA Negative   NITRITE Negative   LEUKOCYTESUR Negative       Significant Imaging: I have reviewed all pertinent imaging results/findings within the past 24 hours.    Assessment/Plan:     * Acute encephalopathy  Fatigue, worsening weakness, confusion, somnolent with poor PO intake for last few days. Some nausea and vomiting as well. Patient had a few episodes of nausea and vomiting. Metabolic and infectious workup so far largely negative with mild hyponatremia and KERMIT discovered. UA negative for infection. Covid negative. Given that  patient is on chronic immunosuppression, may not mount fever and WBC count. CTH and MRI without acute changes. Lethargic and tachycardic, but oriented to self, place, time, and situation. No apparent source of infection at this time. Possibly recrudescence of stroke vs occult infection.     - EEG ordered  - f/u blood cultures, legionella, fungitell, RIP  - IVF if Cr still elevated  - Can start empiric antibiotics if hypotension re-occurs.   - If not improving, consider LP and CT C/A/P  - PT/OT  - consider consulting ID and neuro if not improving    Vascular Dementia  Noted by neuropsych Dr. Crabtree  Chronic changes and remote infarcts on MRI brain and CTH      Age-related physical debility  PT/OT ordered      Hyponatremia  Mild hyponatremia of 132.  Likely not the cause of AMS.     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 Still has residual weakness, but feels it is worse on L side that her baseline      Hyperlipidemia  Continued statin      Pulmonary hypertension due to interstitial lung disease  Past echos have shown elevated PA pressures. Follows with pulm and cards. Does not require oxygen at baseline      KERMIT (acute kidney injury)  Patient with acute kidney injury likely due to pre-renal azotemia KERMIT is currently stable. Labs reviewed- Renal function/electrolytes with Estimated Creatinine Clearance: 25.1 mL/min (A) (based on SCr of 1.6 mg/dL (H)). according to latest data. Monitor urine output and serial BMP and adjust therapy as needed. Avoid nephrotoxins and renally dose meds for GFR listed above.     Lab Results   Component Value Date    CREATININE 1.6 (H) 05/12/2023       Plan:   - Check urine lytes and  renal ultrasound  - Strict I&Os and daily weights   - Daily BMP  - Trend sCr  - Avoid nephrotoxic agents such as NSAIDs, gadolinium, ACEi, ARBs and IV radiocontrast.  - Renally dose meds to current GFR.  - Maintain MAP > 65.  - No indications for HD at this time.   - Maintain electrolytes at goal: Mg > 2, Phos > 3, K > 4.          Thrombocytopenia  plt 86, stable from before  No overt bleeding.     - daily CBC  -transfuse if plt <50K with bleeding or plt<10K without           AF (paroxysmal atrial fibrillation)  Patient with Paroxysmal (<7 days) atrial fibrillation which is controlled currently with Beta Blocker. Patient is currently in sinus rhythm.[id number] Score: 8. HASBLED Score:5 . Anticoagulation indicated. Anticoagulation done with eliquis.        Hypertension, essential  On amlodipine and coreg at home    - held for now given hypotension in clinic prior to arrival.  - restart as able      Rheumatoid arthritis of multiple sites  Dx in 2012. On hydroxychloroquine, cellcept, prednisone. Had previously tried humira in 2018 followed by ICU admission for pneumonia    - continued hydroxychloroquine and prednisone  - held cellcept for now given some concern for opportunistic infection      PUD (peptic ulcer disease)  Continue home PPI      Iron deficiency anemia secondary to inadequate dietary iron intake  Continue home iron every other day        VTE Risk Mitigation (From admission, onward)         Ordered     apixaban tablet 2.5 mg  2 times daily         05/13/23 0154     IP VTE HIGH RISK PATIENT  Once         05/13/23 0147     Place sequential compression device  Until discontinued         05/13/23 0147                       On 05/13/2023, patient should be placed in hospital observation services under my care in collaboration with Dr. La.    Dottie Castillo MD  Department of Hospital Medicine  [facility] - Cardiology Stepdown

## 2023-05-13 NOTE — PROGRESS NOTES
05/13/23 1022   Smith Pre-Screening   Is patient able to be positioned upright with some head control?   1 Yes   Smith instructions Perform oral hygiene prior to Smith screening   Smith Bedside Swallowing Screen   Smith pass/fail Pass (should be able to swallow diet safely)   1. Is Patient Alert? (can follow commands) 1 Yes   Smith instructions Continue screen   Speech instructions Continue screen   2a. Dysarthria (speech slurred or garbled) 2 No   2b. Aphasia (trouble speaking or understanding words)  2 No   3a. Able to clench teeth 1 Yes   3b. Able to close lips 1 Yes   3c. Face is symmetrical with movement 1 Yes   3d. Tongue is midline 1 Yes   3e. Uvula is midline 1 Yes   Motor facial strength instructions Continue screen   TSP (calculated) 4   4a. Gag reflex is present 1 Yes   4b. Has voluntary cough (have pt. cough 2 times) 1 Yes   4c. Able to swallow own secretions (no drooling) 1 Yes   4d. Swallow reflex is present 1 Yes   Gag reflex instructions Continue screen   5. Give a teaspoon (5ml) of water   5a. Choked with swallowing 2 No   5b. Voice sounds gurgly 2 No   5c. Coughed after water 2 No   5d. Water dribbles out of mouth 2 No   Teaspoon of water Continue screen   60ml (calculated) 8   6. Give 60ml of water (if teaspoon was tolerated)    6a. Choked with swallowing 2 No   6b. Voice sounds gurgly 2 No   6c. Coughed after water 2 No   6d. Water dribbles out of mouth  2 No     Bedside swallow assessment completed-pt passed and team notified.

## 2023-05-13 NOTE — ASSESSMENT & PLAN NOTE
plt 86, stable from before  No overt bleeding.     - daily CBC  -transfuse if plt <50K with bleeding or plt<10K without

## 2023-05-13 NOTE — PLAN OF CARE
"Sw attempted to complete discharge planning assessment and family at bedside cut me off and stated " we need a break before we speak to anyone, we want to speak to an attending, we just arrived at 3 am and we need a little time." CM told them that someone would return to complete discharge planning assessment.    05/13/23 1045   Discharge Assessment   Assessment Type Discharge Planning Assessment       "

## 2023-05-13 NOTE — HOSPITAL COURSE
Patient admitted to hospital medicine for evaluation and management of acute encephalopathy worsening since Wednesday, 5/10. Likely due to dehydration, hypoglycemia, and hyponatremia in setting of poor oral intake. Admitted with KERMIT (Cr 1.6) and Na 132, which returned to baseline after receiving 30mg/kg LR. Glucose as low as 44, repleted with IV dextrose. Also discussed possible contribution of polypharmacy with family. She takes baclofen twice daily and gabapentin nightly as well as prn for chronic muscle stiffness and tremors. Recommended decreasing medications to only use at night. Discussed some possibility of CVA recrudescence in setting of metabolic insult. Concern for infection low given her normal WBC, procal, lactate, UA, resp viral panel, and blood cultures, in addition to hemodynamic stability and afebrile. Tbili mildly elevated 1.3, RUQ US ordered out of abundance of caution in otherwise immunosuppressed individual (and Tbili 1.3), was unremarkable. EEG without evidence of seizure. Shortly after admission, patient returned to baseline mentation, however she then began to experience sundowning and hospital induced delirium. She will discharge to SNF based on PT/OT recommendations.    Constitutional:       General: sleepy, no apparent distress  HENT:      Head: Normocephalic and atraumatic.   Cardiovascular:      Rate and Rhythm: Normal rate and regular rhythm.      Heart sounds: Normal heart sounds.   Pulmonary:      Effort: Pulmonary effort is normal.      Breath sounds: Normal breath sounds.   Musculoskeletal:      Cervical back: Normal range of motion.   Skin:     General: Skin is warm and dry   Neurological:      Mental Status: oriented to person, place, and time.   Psychiatric:         Mood and Affect: Mood normal.         Behavior: Behavior normal.

## 2023-05-13 NOTE — ASSESSMENT & PLAN NOTE
On amlodipine and coreg at home    - held for now given hypotension in clinic prior to arrival.  - restart as able

## 2023-05-13 NOTE — DISCHARGE INSTRUCTIONS
Hold morning and afternoon doses of baclofen and gabapentin until follow up with PCP. Can continue night time doses of both medications.

## 2023-05-14 NOTE — PT/OT/SLP EVAL
Physical Therapy Co-Evaluation and Co-Treatment    Patient Name:  Selma Lux   MRN:  [id number]    Recommendations:     Discharge Recommendations:  nursing facility, skilled   Discharge Equipment Recommendations: to be determined by next level of care   Barriers to discharge: decreased functional mobility, fall risk, decreased caregiver support, and inaccessible home    Assessment:     Selma Lux is a 87 y.o. female admitted with a medical diagnosis of Acute encephalopathy.  Pt demonstrates the below listed impairments with decreased tolerance to functional mobility, weakness, and balance instability being the most limiting.  Pt demonstrates fair tolerance to EOB mobility and is willing to participate in a stand this day. She is noted to have reduced strength to her L side and has slowed cognition requiring cues to answer questions throughout the session.  Per her family she will not have enough support at home and would benefit from placement to improve her strength for a safe d/c home.  Pt is not safe for home discharge at this time due to patient's status as: a fall risk, cognitively impaired, and pt has no 24/7 assist and requires skilled PT.      Impairments and functional limitations:  weakness, impaired endurance, impaired self care skills, impaired functional mobility, gait instability, impaired balance, impaired cognition, decreased coordination, decreased upper extremity function, decreased lower extremity function, impaired cardiopulmonary response to activity.  These deficits affect their roles and responsibilities in which they were able to complete prior to admit.  Rehab Prognosis:   Good ; patient would benefit from acute skilled PT services 3 x/week to address these deficits, improve quality of life, focus on recovery of impairments, provide patient/caregiver education, reduce fall risk, and reach maximum level of function.  Pt is highly  motivated to participated in skilled PT.     Recent Surgery:   * No surgery found *      Plan:     During this hospitalization, patient to be seen 3 x/week to address the identified rehab impairments via gait training, therapeutic exercises, therapeutic activities, neuromuscular re-education, wheelchair management/training and progress toward the following goals:    Plan of Care Expires:  06/13/23    Subjective     Chief Complaint: decreased tolerance to functional mobility  Patient/Family Comments/Goals: Progress to SNF  Pain/Comfort:  Pain Rating 1: 0/10    Patients cultural, spiritual, Anglican conflicts given the current situation: no    Living Environment:  Patient lives with their 2 daughters  in single story home, 5 steps with Bilateral hand rail, tub/shower.   Pt utilizes RW for ambulation of home distances.  Per family patient has been too weak for ambulation and she has not been able to self propel her w/c lately.  Prior to admission, patient required assist with all ADLs.   DME owned: bedside commode, walker, rolling, wheelchair, cane, straight, bath bench, cane, quad.   DME not currently used: n/a.   Upon discharge, patient will have assistance from family or sitter with no 24/7 assist. Pt has a sitter Mon-Sat from 9-4    Objective:     Communicated with nursing prior to session.  Patient found HOB elevated with PureWick, telemetry  upon PT entry to room.    General Precautions: Standard, fall   Orthopedic Precautions:N/A   Braces: N/A   Oxygen Device:      Exams:  Cognitive Exam:  Patient is oriented to Person, Place, and Time (requires cues for some questions)  Command following: Patient follows 100% of commands   RLE ROM: WFL  RLE Strength: Hip flex.         3+/5  Knee ext.       4/5   Ankle plan.   4+/5  Ankle dorsi.   3/5  LLE ROM: WFL  LLE Strength: Hip flex.         3-/5  Knee ext.       3+/5   Ankle plan.   3+/5  Ankle dorsi.   3-/5  Postural Exam:  Patient presented with the following abnormalities:    -       Rounded shoulders  -        Forward head  Sensation:    -       Intact    Functional Mobility:  Bed Mobility:  Rolling Left: Max A of 2 persons  Scooting: Max A of 2 persons  Supine to Sit: Max A of 2 persons and for LE management and trunk management  Sit to Supine: Max A of 2 persons and for LE management and trunk management  Scooting up to head of bed in supine: Max A of 2 persons and for LE management and trunk management  Head of bed position: HOB elevated  EOB for 12min with Min-Max A  Posterior lean, some anxiety with being EOB    Transfers:  Sit to Stand: Max A with HHA  Forward flexed, able to extend her legs however is not able to extend her trunk    Gait: n/a, pt not safe for ambulation    Balance:   Position Score Time   Static Sitting POOR+: MINIMAL assist to maintain 3 minute(s)   Dynamic Sitting POOR-: MAXIMAL assist to maintain  9 minute(s)   Static Standing POOR-: MAXIMAL assist to maintain  ~10 seconds   Dynamic Standing n/a: dependent n/a       Therapeutic Activities:  Patient educated on role of acute care PT and PT POC, safety while in hospital including calling nurse for mobility, call light usage, benefits of out of bed mobility, walker management, breathing technique, fall risk, bed mobility , transfers, positioning, posture, risks of prolonged bed rest, possible discharge disposition , and benefits of continued PT by explanation and demonstration.    Patient demonstrates fair understanding of education provided this day.   Whiteboard updated  Cues throughout EOB for posture and positioning    Therapeutic Exercises:  n/a    AM-PAC 6 CLICK MOBILITY  Total Score:9     Patient left HOB elevated with all lines intact, call button in reach, bed alarm on, RN notified, and family present.    GOALS:   Multidisciplinary Problems       Physical Therapy Goals          Problem: Physical Therapy    Goal Priority Disciplines Outcome Goal Variances Interventions   Physical Therapy Goal     PT, PT/OT Ongoing, Progressing      Description: Goals to be met by: 23    Patient will increase functional independence with mobility by performin. Supine to sit with Minimum Assist using LRAD as needed  2. Sit to supine with Minimum Assist using LRAD as needed  3. Sit to stand transfer with Minimum Assist using LRAD as needed  4. Bed to chair transfer with Moderate Assist using LRAD as needed  5. Gait  x 10 feet with Maximal Assist using LRAD as needed  6. Wheelchair propulsion x50 feet with Minimum Assist using LRAD as needed using bilateral uppper extremities  7. Ascend/descend 5 stair with bilateral handrails Maximal Assist using LRAD as needed  8. Lower extremity exercise program x15 reps per handout, with supervision                         History:     Past Medical History:   Diagnosis Date    Acute cystitis without hematuria 2020    Acute hypoxemic respiratory failure 2018    Acute respiratory failure with hypoxia 3/2/2020    KERMIT (acute kidney injury) 3/13/2019    Altered mental status     Anemia     Arthritis     Bilateral hand pain 3/14/2018    Branch retinal vein occlusion, left eye 2015    Chronic bilateral low back pain without sciatica 3/23/2017    Chronic renal failure in pediatric patient, stage 3 (moderate) 4/15/2018    Chronic renal failure syndrome, stage 3 (moderate) 4/15/2018    Chronic systolic (congestive) heart failure 2021    Last Assessment & Plan:  Formatting of this note might be different from the original. -last ANTOLIN was done on 2020:  Grade 1 mild left ventricular diastolic dysfunction    Cognitive communication deficit 2017    COPD exacerbation 2022    Cystoid macular edema, left eye 2015    Cystoid macular edema, left eye 2015    Delirium 2021    DJD (degenerative joint disease) of cervical spine 5/15/2013    Enterococcal bacteremia     Fatty liver 2014    Goiter 2018    Hashimoto's disease     Hemichorea 2017    History of 2019 novel  coronavirus disease (COVID-19) 4/11/2022 2/2022    Hypertension     Hypertensive encephalopathy     IBS (irritable bowel syndrome) 6/21/2017    IGT (impaired glucose tolerance) 1/12/2016    Intracranial subdural hematoma 1/4/2022    Last Assessment & Plan: Formatting of this note might be different from the original. Hospitalization late 2021, w/ rehab to follow (no notes available) --12/13/2021-CT head revealed thin extra-axial hyperdense collection overlying the right cerebral convexity compatible with acute subdural hematoma, neurosurgery was consulted, patient was noted with Keppra 500 mg b.i.d., apixaban was held -12/19/    Iron deficiency anemia secondary to inadequate dietary iron intake 6/24/2013    Joint pain     Keratoconjunctivitis sicca of both eyes not specified as Sjogren's 7/29/2016    Leiomyoma of uterus, unspecified 9/16/2014    Long QT interval 6/29/2016    Due to medication (plaquenil)     Macular edema 1/12/2015    Multinodular goiter 1/12/2016    Neuropathy 8/23/2017    Plaquenil causing adverse effect in therapeutic use 10/7/2016    Pneumococcal vaccine refused 8/17/2016    Pneumonia due to Streptococcus pneumoniae 4/5/2018    Poor nutrition 12/23/2018    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/2015    RA (rheumatoid arthritis) 12/13/2021    -managed by rhematology, since this is a duplicate problem list entry, will archive this     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       Past Surgical History:   Procedure Laterality Date    BREAST CYST EXCISION      CATARACT EXTRACTION      COLONOSCOPY N/A 9/29/2015    Procedure:  COLONOSCOPY;  Surgeon: FIDELINA Sanchez MD;  Location: [facility] ENDO (4TH FLR);  Service: Endoscopy;  Laterality: N/A;    EYE SURGERY      INJECTION OF ANESTHETIC AGENT AROUND NERVE Left 4/19/2021    Procedure: BLOCK, NERVE, FEMORAL AND OBTURATOR;  Surgeon: Shivam Gonzalez MD;  Location: [facility] PAIN MGT;  Service: Pain Management;  Laterality: Left;  consent needed    JOINT REPLACEMENT      right knee    KNEE SURGERY Left 12/31/2013    TKR    left parotidectomy      mixed tumor    MT EVAL,SWALLOW FUNCTION,CINE/VIDEO RECORD  6/5/2021         SALIVARY GLAND SURGERY      TONSILLECTOMY      UPPER GASTROINTESTINAL ENDOSCOPY         Time Tracking:     PT Received On: 05/14/23  PT Start Time: 0818     PT Stop Time: 0837  PT Total Time (min): 19 min     Billable Minutes: Evaluation 10 and Neuromuscular Re-education 9    05/14/2023    Co-treatment performed for this visit due to patient need for two skilled therapists to ensure patient and staff safety, to accommodate for patient activity tolerance/pain management, and maximize functional potential.

## 2023-05-14 NOTE — ASSESSMENT & PLAN NOTE
86 yo F admitted with fatigue, worsening weakness, confusion, somnolent with poor PO intake for 3-4 days. Some nausea and vomiting as well. Metabolic and infectious workup largely negative with mild hyponatremia and KERMIT discovered.  - UA negative, CXR negative, blood cultures NGTD, COVID negative, abd US clear, respi viral panel negative, procal and lactate WNL  - CTH and MRI without acute changes  - Suspect this is a metabolic encephalopathy caused by dehydration and hypoglycemia in the setting of poor oral intake. Suspect a component of polypharmacy. Cannot rule out recrudesce from prior CVA.   - Resolved on admission    - Discussed appropriate medical regimen with family that will ideally minimize side effects of drowsiness/lethargy

## 2023-05-14 NOTE — SUBJECTIVE & OBJECTIVE
Interval History: No acute events overnight, however patient's daughter notes that she was very active and talkative overnight. Much more engaging on interview today, answers and asks questions regarding her plan of care. Remains somewhat confused, also with aphasia and word confusion, which her daughter states is normal for her when she is tired.     Review of Systems   Constitutional:  Positive for activity change and fatigue. Negative for appetite change, chills and fever.   Respiratory:  Negative for cough, shortness of breath and wheezing.    Cardiovascular:  Negative for chest pain, palpitations and leg swelling.   Gastrointestinal:  Negative for abdominal pain, constipation, diarrhea, nausea and vomiting.   Genitourinary:  Negative for dysuria.   Skin:  Negative for rash and wound.   Neurological:  Positive for speech difficulty and weakness. Negative for dizziness and headaches.   Psychiatric/Behavioral:  Positive for confusion. Negative for agitation.    Objective:     Vital Signs (Most Recent):  Temp: 97.9 °F (36.6 °C) (05/14/23 0754)  Pulse: 91 (05/14/23 0900)  Resp: 18 (05/14/23 0900)  BP: (!) 144/65 (05/14/23 0754)  SpO2: 96 % (05/14/23 0900) Vital Signs (24h Range):  Temp:  [97.8 °F (36.6 °C)-98.2 °F (36.8 °C)] 97.9 °F (36.6 °C)  Pulse:  [18-94] 91  Resp:  [18-20] 18  SpO2:  [96 %-98 %] 96 %  BP: (129-156)/(60-68) 144/65     Weight: 72.5 kg (159 lb 13.3 oz)  Body mass index is 27.44 kg/m².    Intake/Output Summary (Last 24 hours) at 5/14/2023 1116  Last data filed at 5/14/2023 0535  Gross per 24 hour   Intake 200 ml   Output 1800 ml   Net -1600 ml         Physical Exam  Vitals and nursing note reviewed.   Constitutional:       General: She is not in acute distress.     Appearance: Normal appearance. She is not ill-appearing.      Comments: Sitting up in bed, eating breakfast   HENT:      Head: Normocephalic and atraumatic.   Eyes:      General: No scleral icterus.  Cardiovascular:      Rate and Rhythm:  Normal rate and regular rhythm.      Pulses: Normal pulses.      Heart sounds: No murmur heard.    No gallop.   Pulmonary:      Effort: Pulmonary effort is normal. No respiratory distress.      Breath sounds: Normal breath sounds. No wheezing or rales.   Abdominal:      General: Abdomen is flat. There is no distension.      Palpations: Abdomen is soft.   Musculoskeletal:      Cervical back: Normal range of motion. No rigidity.      Right lower leg: No edema.      Left lower leg: No edema.   Skin:     General: Skin is warm and dry.   Neurological:      General: No focal deficit present.      Mental Status: She is alert.      Motor: Weakness present.      Comments: Oriented to person and year  Place: [employer] (worked there for 50 years)   Psychiatric:         Mood and Affect: Mood normal.         Behavior: Behavior normal.           Significant Labs: All pertinent labs within the past 24 hours have been reviewed.    Significant Imaging: I have reviewed all pertinent imaging results/findings within the past 24 hours.

## 2023-05-14 NOTE — PLAN OF CARE
SW spoke with pt and dtr at bedside. Pt appears confused. Dtr reported that she would like her mother to go to [facility]. Referral initiated via [facility]. Floor CM/SW to F/U.     GRANT Calixto, LCSW  [place] [place] [facility] [phone]

## 2023-05-14 NOTE — ASSESSMENT & PLAN NOTE
Dx in 2012. On hydroxychloroquine, cellcept, prednisone. Had previously tried humira in 2018 followed by ICU admission for pneumonia    - continued hydroxychloroquine and prednisone  - restart cellcept given rule out of any likely infection

## 2023-05-14 NOTE — PLAN OF CARE
Problem: Adult Inpatient Plan of Care  Goal: Plan of Care Review  Outcome: Ongoing, Progressing  Goal: Patient-Specific Goal (Individualized)  Outcome: Ongoing, Progressing  Goal: Absence of Hospital-Acquired Illness or Injury  Outcome: Ongoing, Progressing  Goal: Optimal Comfort and Wellbeing  Outcome: Ongoing, Progressing  Goal: Readiness for Transition of Care  Outcome: Ongoing, Progressing     Problem: Adjustment to Illness (Delirium)  Goal: Optimal Coping  Outcome: Ongoing, Progressing     Problem: Altered Behavior (Delirium)  Goal: Improved Behavioral Control  Outcome: Ongoing, Progressing     Problem: Attention and Thought Clarity Impairment (Delirium)  Goal: Improved Attention and Thought Clarity  Outcome: Ongoing, Progressing     Problem: Sleep Disturbance (Delirium)  Goal: Improved Sleep  Outcome: Ongoing, Progressing     Problem: Fluid and Electrolyte Imbalance (Acute Kidney Injury/Impairment)  Goal: Fluid and Electrolyte Balance  Outcome: Ongoing, Progressing     Problem: Oral Intake Inadequate (Acute Kidney Injury/Impairment)  Goal: Optimal Nutrition Intake  Outcome: Ongoing, Progressing     Problem: Renal Function Impairment (Acute Kidney Injury/Impairment)  Goal: Effective Renal Function  Outcome: Ongoing, Progressing     Problem: Fall Injury Risk  Goal: Absence of Fall and Fall-Related Injury  Outcome: Ongoing, Progressing     Problem: Skin Injury Risk Increased  Goal: Skin Health and Integrity  Outcome: Ongoing, Progressing

## 2023-05-14 NOTE — PT/OT/SLP EVAL
"Occupational Therapy  CO Evaluation w PT  CoEval performed to optimize pt participation and assessment of full functional capacity.      Name: Selma Lux  MRN: [id number]  Admitting Diagnosis: Acute encephalopathy  Recent Surgery: * No surgery found *      Recommendations:     Discharge Recommendations: nursing facility, skilled  Discharge Equipment Recommendations:  to be determined by next level of care  Barriers to discharge:       Assessment:     Selma Lux is a 87 y.o. female with a medical diagnosis of Acute encephalopathy.  She presents AO to person and place w confusion in regard to time. Pt with fair tolerance to session on this date completing bed mob requiring Max x 2. Pt noted to have significant  posterior lean w static and dynamic sitting balance in addition to L side weakness. Pt requiring increased assistance w ADL completion. Daughter at bedside stating pt able to assist w transfers at baseline w pts current functional state not baseline. Performance deficits affecting function: weakness, impaired endurance, impaired self care skills, impaired functional mobility, gait instability, impaired balance, impaired cognition, decreased coordination, decreased upper extremity function, decreased lower extremity function, decreased safety awareness.    Pt not at baseline for ADL and functional mobility performance in addition to concerns of fall risk and would benefit from continued OT services at this time.    Rehab Prognosis: Good; patient would benefit from acute skilled OT services to address these deficits and reach maximum level of function.       Plan:     Patient to be seen 3 x/week to address the above listed problems via self-care/home management, therapeutic activities, therapeutic exercises, neuromuscular re-education  Plan of Care Expires: 06/14/23  Plan of Care Reviewed with: patient, daughter    Subjective     Chief Complaint: weakness  Patient/Family Comments/goals: "She has " "to be able to help with transfers because I cant do it" per daughter     Occupational Profile:  Living Environment: Lives w daughters in H, 5 [address], B[facility], TS combo  Previous level of function: Mod- Max A w ADLs  Equipment Used at Home: bedside commode, bath bench, cane, quad, cane, straight, wheelchair, walker, rolling, shower chair  Assistance upon Discharge: family and caretaker Mon- Sat(9-4) however not 24/7 A    Pain/Comfort:  Pain Rating 1: 0/10    Patients cultural, spiritual, Anabaptism conflicts given the current situation: no    Objective:     Communicated with: RN prior to session.  Patient found supine with  (no active lines) upon OT entry to room.    General Precautions: Standard, fall  Orthopedic Precautions: N/A  Braces: N/A  Respiratory Status: Room air    Occupational Performance:    Bed Mobility:    Patient completed Scooting/Bridging with maximal assistance and 2 persons  Patient completed Supine to Sit with maximal assistance and 2 persons  Patient completed Sit to Supine with maximal assistance and 2 persons  ~12 min EOB requiring MAX A; moments of Min A w tactile and vcs    Functional Mobility/Transfers:  Patient completed Sit <> Stand Transfer with maximal assistance  with  hand-held assist   Poor posture  Functional Mobility: deferred 2/2 pt w decreased standing tolerance     Activities of Daily Living:  Not completed on this date     Cognitive/Visual Perceptual:  Cognitive/Psychosocial Skills:     -       Oriented to: Person and Place   -       Follows Commands/attention:Follows two-step commands  -       Communication: clear/fluent  -       Memory: impaired  -       Safety awareness/insight to disability: impaired   -       Mood/Affect/Coping skills/emotional control: Cooperative and Pleasant    Physical Exam:  Postural examination/scapula alignment:    -       Rounded shoulders  -       Forward head  -       Kyphosis  Upper Extremity Range of Motion:     -       Right Upper Extremity: <90 " degrees AROM and PROM  -       Left Upper Extremity: <90 degrees AROM; >90 PROM  Upper Extremity Strength:    -       Right Upper Extremity: 3-/5  -       Left Upper Extremity: 3-/5   Strength: -       Right Upper Extremity: WFL  -       Left Upper Extremity: WFL    AMPAC 6 Click ADL:  AMPAC Total Score: 16    Treatment & Education:  Pt educated on scope of practice and importance of daily functional mobility.   Pt educated on safety precautions during transfers  Pt updated on POC  White board updated to reflect pt status and visual reminder included on board instructing her to call for nurse as needed.      Patient left supine with all lines intact, call button in reach, RN notified, and daughter present    GOALS:   Multidisciplinary Problems       Occupational Therapy Goals          Problem: Occupational Therapy    Goal Priority Disciplines Outcome Interventions   Occupational Therapy Goal     OT, PT/OT Ongoing, Progressing    Description: Goals to be met by: 5/28/23     Patient will increase functional independence with ADLs by performing:    UE Dressing with Contact Guard Assistance.  LE Dressing with Minimal Assistance.  Grooming while EOB with Contact Guard Assistance.  Toileting from bedside commode with Contact Guard Assistance for hygiene and clothing management.   Toilet transfer to bedside commode with Minimal Assistance.                         History:     Past Medical History:   Diagnosis Date    Acute cystitis without hematuria 2/27/2020    Acute hypoxemic respiratory failure 4/11/2018    Acute respiratory failure with hypoxia 3/2/2020    KERMIT (acute kidney injury) 3/13/2019    Altered mental status     Anemia     Arthritis     Bilateral hand pain 3/14/2018    Branch retinal vein occlusion, left eye 2/20/2015    Chronic bilateral low back pain without sciatica 3/23/2017    Chronic renal failure in pediatric patient, stage 3 (moderate) 4/15/2018    Chronic renal failure syndrome, stage 3 (moderate)  4/15/2018    Chronic systolic (congestive) heart failure 8/6/2021    Last Assessment & Plan:  Formatting of this note might be different from the original. 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 Hospitalization late 2021, w/ rehab to follow (no notes available) --12/13/2021-CT head revealed thin extra-axial hyperdense collection overlying the right cerebral convexity compatible with acute subdural hematoma, neurosurgery was consulted, patient was noted with Keppra 500 mg b.i.d., apixaban was held -12/19/    Iron deficiency anemia secondary to inadequate dietary iron intake 6/24/2013    Joint pain     Keratoconjunctivitis sicca of both eyes not specified as Sjogren's 7/29/2016    Leiomyoma of uterus, unspecified 9/16/2014    Long QT interval 6/29/2016    Due to medication (plaquenil)     Macular edema 1/12/2015    Multinodular goiter 1/12/2016    Neuropathy 8/23/2017    Plaquenil causing adverse effect in therapeutic use 10/7/2016    Pneumococcal vaccine refused 8/17/2016    Pneumonia due to Streptococcus pneumoniae 4/5/2018    Poor nutrition 12/23/2018    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/2015    RA (rheumatoid arthritis) 12/13/2021     -managed by rhematology, since this is a duplicate problem list entry, will archive this     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         Past Surgical History:   Procedure Laterality Date    BREAST CYST EXCISION      CATARACT EXTRACTION      COLONOSCOPY N/A 9/29/2015    Procedure: COLONOSCOPY;  Surgeon: FIDELINA Sanchez MD;  Location: [facility] ENDO ([address] [place] [address]);  Service: Endoscopy;  Laterality: N/A;    EYE SURGERY      INJECTION OF ANESTHETIC AGENT AROUND NERVE Left 4/19/2021    Procedure: BLOCK, NERVE, FEMORAL AND OBTURATOR;  Surgeon: Shivam Gonzalez MD;  Location: [facility] PAIN MGT;  Service: Pain Management;  Laterality: Left;  consent needed    JOINT REPLACEMENT      right knee    KNEE SURGERY Left 12/31/2013    TKR    left parotidectomy      mixed tumor    CO EVAL,SWALLOW FUNCTION,CINE/VIDEO RECORD  6/5/2021         SALIVARY GLAND SURGERY      TONSILLECTOMY      UPPER GASTROINTESTINAL ENDOSCOPY         Time Tracking:     OT Date of Treatment: 05/14/23  OT Start Time: 0818  OT Stop Time: 0837  OT Total Time (min): 19 min    Billable Minutes:Evaluation 10  Therapeutic Activity 9    5/14/2023

## 2023-05-14 NOTE — ASSESSMENT & PLAN NOTE
Patient with acute kidney injury likely due to pre-renal azotemia KERMIT is currently RESOLVED. Labs reviewed- Renal function/electrolytes with Estimated Creatinine Clearance: 33.4 mL/min (based on SCr of 1.2 mg/dL). according to latest data. Monitor urine output and serial BMP and adjust therapy as needed. Avoid nephrotoxins and renally dose meds for GFR listed above.     Lab Results   Component Value Date    CREATININE 1.2 05/14/2023

## 2023-05-14 NOTE — PLAN OF CARE
Problem: Occupational Therapy  Goal: Occupational Therapy Goal  Description: Goals to be met by: 5/28/23     Patient will increase functional independence with ADLs by performing:    UE Dressing with Contact Guard Assistance.  LE Dressing with Minimal Assistance.  Grooming while EOB with Contact Guard Assistance.  Toileting from bedside commode with Contact Guard Assistance for hygiene and clothing management.   Toilet transfer to bedside commode with Minimal Assistance.    Outcome: Ongoing, Progressing

## 2023-05-14 NOTE — PLAN OF CARE
Problem: Physical Therapy  Goal: Physical Therapy Goal  Description: Goals to be met by: 23    Patient will increase functional independence with mobility by performin. Supine to sit with Minimum Assist using LRAD as needed  2. Sit to supine with Minimum Assist using LRAD as needed  3. Sit to stand transfer with Minimum Assist using LRAD as needed  4. Bed to chair transfer with Moderate Assist using LRAD as needed  5. Gait  x 10 feet with Maximal Assist using LRAD as needed  6. Wheelchair propulsion x50 feet with Minimum Assist using LRAD as needed using bilateral uppper extremities  7. Ascend/descend 5 stair with bilateral handrails Maximal Assist using LRAD as needed  8. Lower extremity exercise program x15 reps per handout, with supervision    Outcome: Ongoing, Progressing     Pt tolerated PT session fairly well.      All needs met, all questions answered within PT scope of practice.

## 2023-05-14 NOTE — ASSESSMENT & PLAN NOTE
Patient with Paroxysmal (<7 days) atrial fibrillation which is controlled currently with Beta Blocker. Patient is currently in sinus rhythm.[id number] Score: 8. HASBLED Score:5 . Anticoagulation indicated. Anticoagulation done with eliquis.

## 2023-05-14 NOTE — UM SECONDARY REVIEW
UM Secondary Reviewer    Observation > 48 hours    Approved Inpatient    [facility] Necessity Status Recommendation      Recommendation Summary  ? 05/12/2023 : Outpatient  ? 05/13/2023 : Inpatient    Recommendation for 05/13/2023    Supporting Clinical Factors:  Physician documentation indicates the presence of an acute condition that requires ongoing therapeutic intervention in the hospital setting:    ? An inadequate or suboptimal response to initial medical or surgical therapy requiring ongoing diagnostic testing, monitoring or management for patient response    Rationale:  The concern of the physician is for transient obtundation following a stroke presenting on  5/12/2023.    Inpatient services were appropriate on 5/13/2023 for this patient of advanced age who was determined to remain at increased risk for acutely progressive neurological complications of a prior stroke requiring further diagnostics, therapeutics and monitoring over another midnight. Outpatient services on 5/12/2023 remained appropriate to further evaluate this medically stable patient not requiring intensive care, having improving mental status while concurrently having otherwise non-critical acute exam findings requiring no further immediate urgent substantial interventional or invasive procedures.    The plan of care included IV fluids, cultures, labs, head CT, brain MRI, awaiting an EEG and  monitoring over a second midnight.

## 2023-05-14 NOTE — PROGRESS NOTES
[facility] - Cardiology [facility] Medicine  Progress Note    Patient Name: Selma Lux  MRN: [id number]  Patient Class: OP- Observation   Admission Date: 5/12/2023  Length of Stay: 0 days  Attending Physician: David La MD  Primary Care Provider: Génesis Rosario MD        Subjective:     Principal Problem:Acute encephalopathy        HPI:  87 y.o. female with history of subdural hematoma, drug-induced adrenal insufficiency, thyroid abnormalities, hypertension, previous stroke with residual hemiplegia, COPD, Hashimoto's, cryptogenic organizing pneumonia, RA (on plaquenil and cellcept), hip OA, paroxysmal Afib, vascular dementia, iron deficiency anemia, HTN, HLD who presents to the ED with left sided weakness times 3-4 days, general malaise, poor appetite, and nausea/vomiting. She was brought in from PCP clinic (Dr. Rosario) where she vomited, was given phenergan, IV attempted for fluid resuscitation, and became hypotensive to 95/60 prior to being brought to the ED.     At time of my evaluation, she was fatigued and dosing off, but arousable. Oriented to self, date, birthday, president, and place. Denied ongoing nausea or vomiting since being in clinic. Denies fever or chills. She and her daughters state that she has been eating, but less than before and with decreased UOP. Normally she is in a wheelchair at baseline, but has been struggling to transfer on her own which can usually do. She received vanc/zosyn and 2 L LR in the ED. Workup was significant for sodium 132, plt 86, Cr 1.6, BUN 24. UDS, troponin, TSH, UA, procal, ESR, lactate were essentially normal. Covid negative.       Overview/Hospital Course:  Patient admitted to hospital medicine for evaluation and management of acute encephalopathy worsening since Wednesday, 5/10. Likely due to dehydration, hypoglycemia, and hyponatremia in setting of poor oral intake. Admitted with KERMIT (Cr 1.6) and Na 132, which returned to baseline after  receiving 30mg/kg LR. Glucose as low as 44, repleted with IV dextrose. Also discussed possible contribution of polypharmacy with family. She takes baclofen twice daily and gabapentin nightly as well as prn for chronic muscle stiffness and tremors. Recommended decreasing medications to only use at night. Discussed some possibility of CVA recrudescence in setting of metabolic insult. Concern for infection low given her normal WBC, procal, lactate, UA, resp viral panel, and blood cultures, in addition to hemodynamic stability and afebrile. Tbili mildly elevated 1.3, RUQ US ordered out of abundance of caution in otherwise immunosuppressed individual (and Tbili 1.3), was unremarkable. Shortly after admission, patient returned to baseline mentation. PT/OT recommending SNF, placement pending.       Interval History: No acute events overnight, however patient's daughter notes that she was very active and talkative overnight. Much more engaging on interview today, answers and asks questions regarding her plan of care. Remains somewhat confused, also with aphasia and word confusion, which her daughter states is normal for her when she is tired.     Review of Systems   Constitutional:  Positive for activity change and fatigue. Negative for appetite change, chills and fever.   Respiratory:  Negative for cough, shortness of breath and wheezing.    Cardiovascular:  Negative for chest pain, palpitations and leg swelling.   Gastrointestinal:  Negative for abdominal pain, constipation, diarrhea, nausea and vomiting.   Genitourinary:  Negative for dysuria.   Skin:  Negative for rash and wound.   Neurological:  Positive for speech difficulty and weakness. Negative for dizziness and headaches.   Psychiatric/Behavioral:  Positive for confusion. Negative for agitation.    Objective:     Vital Signs (Most Recent):  Temp: 97.9 °F (36.6 °C) (05/14/23 0754)  Pulse: 91 (05/14/23 0900)  Resp: 18 (05/14/23 0900)  BP: (!) 144/65 (05/14/23  0754)  SpO2: 96 % (05/14/23 0900) Vital Signs (24h Range):  Temp:  [97.8 °F (36.6 °C)-98.2 °F (36.8 °C)] 97.9 °F (36.6 °C)  Pulse:  [18-94] 91  Resp:  [18-20] 18  SpO2:  [96 %-98 %] 96 %  BP: (129-156)/(60-68) 144/65     Weight: 72.5 kg (159 lb 13.3 oz)  Body mass index is 27.44 kg/m².    Intake/Output Summary (Last 24 hours) at 5/14/2023 1116  Last data filed at 5/14/2023 0535  Gross per 24 hour   Intake 200 ml   Output 1800 ml   Net -1600 ml         Physical Exam  Vitals and nursing note reviewed.   Constitutional:       General: She is not in acute distress.     Appearance: Normal appearance. She is not ill-appearing.      Comments: Sitting up in bed, eating breakfast   HENT:      Head: Normocephalic and atraumatic.   Eyes:      General: No scleral icterus.  Cardiovascular:      Rate and Rhythm: Normal rate and regular rhythm.      Pulses: Normal pulses.      Heart sounds: No murmur heard.    No gallop.   Pulmonary:      Effort: Pulmonary effort is normal. No respiratory distress.      Breath sounds: Normal breath sounds. No wheezing or rales.   Abdominal:      General: Abdomen is flat. There is no distension.      Palpations: Abdomen is soft.   Musculoskeletal:      Cervical back: Normal range of motion. No rigidity.      Right lower leg: No edema.      Left lower leg: No edema.   Skin:     General: Skin is warm and dry.   Neurological:      General: No focal deficit present.      Mental Status: She is alert.      Motor: Weakness present.      Comments: Oriented to person and year  Place: [employer] (worked there for 50 years)   Psychiatric:         Mood and Affect: Mood normal.         Behavior: Behavior normal.           Significant Labs: All pertinent labs within the past 24 hours have been reviewed.    Significant Imaging: I have reviewed all pertinent imaging results/findings within the past 24 hours.      Assessment/Plan:      * Acute encephalopathy  88 yo F admitted with fatigue, worsening weakness,  confusion, somnolent with poor PO intake for 3-4 days. Some nausea and vomiting as well. Metabolic and infectious workup largely negative with mild hyponatremia and KERMIT discovered.  - UA negative, CXR negative, blood cultures NGTD, COVID negative, abd US clear, respi viral panel negative, procal and lactate WNL  - CTH and MRI without acute changes  - Suspect this is a metabolic encephalopathy caused by dehydration and hypoglycemia in the setting of poor oral intake. Suspect a component of polypharmacy. Cannot rule out recrudesce from prior CVA.   - Resolved on admission    - Discussed appropriate medical regimen with family that will ideally minimize side effects of drowsiness/lethargy     Vascular Dementia  Noted by neuropsych Dr. Crabtree  Chronic changes and remote infarcts on MRI brain and CTH      Age-related physical debility  PT/OT ordered, recommend SNF. Placement pending.       Hyponatremia  Mild hyponatremia of 132.  Possibly a mild contribution to presentation. 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 Still has residual weakness, but feels it is worse on L side that her baseline      Hyperlipidemia  Continued statin      Pulmonary hypertension due to interstitial lung disease  Past echos have shown elevated PA pressures. Follows with pulm and cards. Does not require oxygen at baseline      KERMIT (acute kidney injury)  Patient with acute kidney injury likely due to pre-renal azotemia KERMIT is currently RESOLVED. Labs reviewed- Renal function/electrolytes with Estimated Creatinine Clearance: 33.4 mL/min (based on SCr of 1.2 mg/dL). according to latest data. Monitor urine output and serial BMP and adjust therapy as needed. Avoid nephrotoxins and renally dose meds for GFR listed above.     Lab Results   Component Value Date    CREATININE 1.2 05/14/2023              Thrombocytopenia  Stable and without evidence of bleed          AF (paroxysmal atrial fibrillation)  Patient with Paroxysmal (<7 days) atrial fibrillation which is controlled currently with Beta Blocker. Patient is currently in sinus rhythm.[id number] Score: 8. HASBLED Score:5 . Anticoagulation indicated. Anticoagulation done with eliquis.        Hypertension, essential  On amlodipine and coreg at home    - held for now given hypotension in clinic prior to arrival.  - restart as able      Rheumatoid arthritis of multiple sites  Dx in 2012. On hydroxychloroquine, cellcept, prednisone. Had previously tried humira in 2018 followed by ICU admission for pneumonia    - continued hydroxychloroquine and prednisone  - restart cellcept given rule out of any likely infection       PUD (peptic ulcer disease)  Continue home PPI      Iron deficiency anemia secondary to inadequate dietary iron intake  Continue home iron every other day        VTE Risk Mitigation (From admission, onward)         Ordered     apixaban tablet 2.5 mg  2 times daily         05/13/23 0154     IP VTE HIGH RISK PATIENT  Once         05/13/23 0147     Place sequential compression device  Until discontinued         05/13/23 0147                Discharge Planning   LETICIA: 5/14/2023     Code Status: Full Code   Is the patient medically ready for discharge?: Yes    Reason for patient still in hospital (select all that apply): Pending disposition  Discharge Plan A: Home        Nanette Ojeda DO PGY1  Department of Hospital Medicine   [facility] - Cardiology Stepdown

## 2023-05-15 PROBLEM — I50.30 HEART FAILURE WITH PRESERVED EJECTION FRACTION: Status: ACTIVE | Noted: 2023-01-01

## 2023-05-15 NOTE — PLAN OF CARE
NURSING HOME ORDERS    05/17/2023  [facility]  SELENA GARCIA - CARDIOLOGY STEPDOWN  [address]  [facility] [address]  Dept: [phone]  Loc: [phone]     Admit to Nursing Home:  Skilled Nursing Facility    Diagnoses:  Active Hospital Problems    Diagnosis  POA    *Acute encephalopathy [G93.40]  Yes    Heart failure with preserved ejection fraction [I50.30]  Yes    Vascular Dementia [F01.A0]  Yes     Chronic     -neurology assessment 7/30/2018, 8/18/2020  -9/8/2017 Neuropsychiatry note Dr. Lagos        Hyponatremia [E87.1]  Yes    Age-related physical debility [R54]  Yes     Chronic    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 [I69.154]  Not Applicable     Chronic     -CVA circa June 2022 with extended post stroke rehab at [facility]        Hyperlipidemia [E78.5]  Yes     Chronic    Pulmonary hypertension due to interstitial lung disease [I27.23, J84.9]  Yes     Chronic     --Past echocardiograms during pulmonary exacerbations have shown mildly elevated PA pressures (37 mmHg maximum). Echo  August 2019 showed decreased LVEF at 40% with some evidence of left-sided heart disease.  12/16/2022 EF 65%, DD.    -The clinical picture currently is more suggestive of WHO 2 pulmonary hypertension that is pretty mild.  There may be an element of WHO 3 pulmonary hypertension at times of more deranged gas exchange.    -followed in cardiology and Pulmonary Clinics        KERMIT (acute kidney injury) [N17.9]  Yes    Thrombocytopenia [D69.6]  Yes     Chronic     -monitor periodically w/ CBC        AF (paroxysmal atrial fibrillation) [I48.0]  Yes     Chronic     -followed by Cardiology, on Eliquis  -PAF noted on admission circa 4/2/2018 for respiratory failure (PNA +/- )        Hypertension, essential [I10]  Yes     Chronic    Rheumatoid arthritis of multiple sites [M05.79]  Yes     Chronic     -Dx 2012 with Dr No, then Kiera CARUSO since  2012  Prednisone 5 mg/d since 2012 previously (doses changed at times due to other conditions, pulmonary)  Humira one dose April 2018 followed by ICU admission for pnemonia and resp failure    -Managed by rheumatology          PUD (peptic ulcer disease) [K27.9]  Yes     Chronic    Iron deficiency anemia secondary to inadequate dietary iron intake [D50.8]  Yes     Chronic      Resolved Hospital Problems    Diagnosis Date Resolved POA    Somnolence [R40.0] 05/13/2023 Unknown       Patient is homebound due to:  Acute encephalopathy    Allergies:Review of patient's allergies indicates:  No Known Allergies    Vitals:  Routine    Diet: regular diet    Activities:   Activity as tolerated    Goals of Care Treatment Preferences:  Code Status: Full Code    Living Will: Yes     What is most important right now is to focus on spending time at home, avoiding the hospital, symptom/pain control.  Accordingly, we have decided that the best plan to meet the patient's goals includes continuing with treatment.      Labs:  defer to SNF    Nursing Precautions:  Aspiration , Fall, and Pressure ulcer prevention    Consults:   PT to evaluate and treat- 5 times a week and OT to evaluate and treat- 5 times a week                 Medications: Discontinue all previous medication orders, if any. See new list below.     Medication List        START taking these medications      predniSONE 10 MG tablet  Commonly known as: DELTASONE  Take 1 tablet (10 mg total) by mouth once daily.            CHANGE how you take these medications      amLODIPine 5 MG tablet  Commonly known as: NORVASC  Take 1 tablet (5 mg total) by mouth once daily.  What changed:   medication strength  how much to take  when to take this     atorvastatin 40 MG tablet  Commonly known as: LIPITOR  TAKE 1 TABLET(40 MG) BY MOUTH EVERY DAY  What changed: when to take this     baclofen 10 MG tablet  Commonly known as: LIORESAL  Take 1 tablet (10 mg total) by mouth nightly as needed  (muscle spasms).  What changed: See the new instructions.     * gabapentin 100 MG capsule  Commonly known as: NEURONTIN  Take 1 capsule (100 mg total) by mouth nightly as needed (neuropathy). Take one 100mg capsule with one 300mg capsule at bedtime as needed for neuropathy  What changed:   when to take this  reasons to take this  additional instructions     * gabapentin 300 MG capsule  Commonly known as: NEURONTIN  Take 1 capsule (300 mg total) by mouth nightly as needed (neuropathy).  What changed:   when to take this  reasons to take this     LIDOcaine 5 %  Commonly known as: LIDODERM  Place 1 patch onto the skin once daily. Remove & Discard patch within 12 hours as needed for pain or as directed by MD    Place patch onto lower back as needed  What changed: additional instructions           * This list has 2 medication(s) that are the same as other medications prescribed for you. Read the directions carefully, and ask your doctor or other care provider to review them with you.                CONTINUE taking these medications      acetaminophen 500 MG tablet  Commonly known as: TYLENOL  Take 2 tablets (1,000 mg total) by mouth 2 (two) times daily as needed (Headache).     albuterol-ipratropium 2.5 mg-0.5 mg/3 mL nebulizer solution  Commonly known as: DUO-NEB  Take 3 mLs by nebulization 2 (two) times daily as needed for Shortness of Breath. Rescue     apixaban 2.5 mg Tab  Commonly known as: ELIQUIS  Take 1 tablet (2.5 mg total) by mouth 2 (two) times daily.     carvediloL 3.125 MG tablet  Commonly known as: COREG  Take 1 tablet (3.125 mg total) by mouth 2 (two) times daily with meals.     ferrous sulfate, dried 160 mg (50 mg iron) Tbsr  Commonly known as: SLOW FE  Take 1 tablet (160 mg total) by mouth every other day.     gabapentin 5% baclofen 2% amitriptyline 2% topical cream  Apply topically 3 (three) times daily.     hydrOXYchloroQUINE 200 mg tablet  Commonly known as: PLAQUENIL  Take 1 tablet (200 mg total) by  mouth 2 (two) times daily.     magnesium oxide 400 mg (241.3 mg magnesium) tablet  Commonly known as: MAG-OX  Take 400 mg by mouth once daily.     montelukast 10 mg tablet  Commonly known as: SINGULAIR  Take 1 tablet (10 mg total) by mouth once daily.     mycophenolate 500 mg Tab  Commonly known as: CELLCEPT  Take 1 tablet (500 mg total) by mouth 2 (two) times daily.     pantoprazole 40 MG tablet  Commonly known as: PROTONIX  Take 1 tablet (40 mg total) by mouth once daily.     sulfamethoxazole-trimethoprim 800-160mg 800-160 mg Tab  Commonly known as: BACTRIM DS  One tablet by mouth every Monday, Wednesday, Friday to prevent lung infection     thiamine 100 MG tablet  Take 100 mg by mouth once daily.     traZODone 50 MG tablet  Commonly known as: DESYREL  Take 1 tablet (50 mg total) by mouth nightly as needed for Insomnia.                Immunizations Administered as of 5/17/2023       Name Date Dose VIS Date Route Exp Date    COVID-19, MRNA, LN-S, PF (Pfizer) (Purple Cap) 9/10/2021 10:14 AM 0.3 mL 12/12/2020 Intramuscular 10/31/2021    Site: Left deltoid     Given By: Cely Valencia RN     : [employer]     Lot: [id number]     COVID-19, MRNA, LN-S, PF (Pfizer) (Purple Cap) 1/30/2021  2:24 PM 0.3 mL 12/12/2020 Intramuscular 5/31/2021    Site: Right deltoid     Given By: Jonah Pierre MA     : [employer]     Lot: [id number]     COVID-19, MRNA, LN-S, PF (Pfizer) (Purple Cap) 1/9/2021  1:40 PM 0.3 mL 12/12/2020 Intramuscular 4/30/2021    Site: Left arm     Given By: Isaac Rubio RN     : [employer]     Lot: [id number]             This patient has had both covid vaccinations    Some patients may experience side effects after vaccination.  These may include fever, headache, muscle or joint aches.  Most symptoms resolve with 24-48 hours and do not require urgent medical evaluation unless they persist for more than 72 hours or symptoms are concerning for an unrelated medical condition.           _________________________________  Kaity Hoffmann MD PGY2  05/17/2023

## 2023-05-15 NOTE — PROCEDURES
Extended Routine EEG Report    Selma Lux  [id number]  1935    DATE OF SERVICE: 5/15/2023  REASON FOR CONSULT:  87-year-old woman with a history of a subdural hematoma, rheumatoid arthritis, paroxysmal AFib, and vascular dementia admitted with confusion and decreased responsiveness.  Evaluate for evidence of epileptiform activity.    METHODOLOGY   Electroencephalographic (EEG) recording is with electrodes placed according to the International 10-20 placement system.  Thirty two (32) channels of digital signal (sampling rate of 512/sec) including T1 and T2 was simultaneously recorded from the scalp and may include  EKG, EMG, and/or eye monitors.  Recording band pass was 0.1 to 512 hz.  Digital video recording of the patient is simultaneously recorded with the EEG.  The patient is instructed report clinical symptoms which may occur during the recording session.  EEG and video recording is stored and archived in digital format. Activation procedures which include photic stimulation, hyperventilation and instructing patients to perform simple task are done in selected patients.    The EEG is displayed on a monitor screen and can be reviewed using different montages.  Computer assisted analysis is employed to detect spike and electrographic seizure activity.   The entire record is submitted for computer analysis.  The entire recording is visually reviewed and the times identified by computer analysis as being spikes or seizures are reviewed again.  Compresses spectral analysis (CSA) is also performed on the activity recorded from each individual channel.  This is displayed as a power display of frequencies from 0 to 30 Hz over time.   The CSA is reviewed looking for asymmetries in power between homologous areas of the scalp and then compared with the original EEG recording.     Neverfail software is also utilized in the review of this study.  This software suite analyzes the EEG recording in multiple domains.   Coherence and rhythmicity is computed to identify EEG sections which may contain organized seizures.  Each channel undergoes analysis to detect presence of spike and sharp waves which have special and morphological characteristic of epileptic activity.  The routine EEG recording is converted from spacial into frequency domain.  This is then displayed comparing homologous areas to identify areas of significant asymmetry.  Algorithm to identify non-cortically generated artifact is used to separate eye movement, EMG and other artifact from the EEG.      RECORDING TIMES  Start on 05/15/2023 at 11:30 a.m.  Stop on 05/15/2023 at 13:52 p.m.-> End of the Recording Session  A total of 2 hours and 12 minutes of EEG recording is obtained.    EEG FINDINGS  Background activity:   The waking background is continuous, relatively symmetric, mixed frequency theta/delta activity with a 7 hz maximal posterior dominant rhythm.    There are no pushbutton activations.    Sleep:  There is evidence of state transitions with the appearance of sleep architecture.    Activation procedures:   The patient is able to follow simple commands and answers orientation questions correctly.     Cardiac Monitor:   Occasional PVCs    Impression:   This is an abnormal extended routine EEG because of generalized background slowing consistent with a mild encephalopathy with evidence of subcortical/deep midline dysfunction.  There are no pushbutton activations, no epileptiform discharges, and no electrographic seizures.    Brianda Goss MD PhD [facility]  Neurology-Epilepsy  [facility].

## 2023-05-15 NOTE — PLAN OF CARE
Plan of care discussed with daughters, patient disoriented to situation, place and time. Patient drowsy throughout the shift. Patient room changed to allow more natural light in room. Patient is free of fall/trauma/injury, fall precautions in place. All questions addressed. Will continue to monitor.

## 2023-05-15 NOTE — ASSESSMENT & PLAN NOTE
Results for orders placed during the hospital encounter of 12/16/22    Echo Saline Bubble? No    Interpretation Summary  · The left ventricle is normal in size with concentric hypertrophy and normal systolic function. The estimated ejection fraction is 65%.  · Normal right ventricular size with normal right ventricular systolic function.  · Grade II left ventricular diastolic dysfunction.  · Severe left atrial enlargement.  · Mild-to-moderate mitral regurgitation.  · The estimated PA systolic pressure is 43 mmHg.  · Normal central venous pressure (3 mmHg).    - Pt with increased WOB 5/15. Will order CXR for evaluation, as she received fluids upon admission. May need a dose of lasix

## 2023-05-15 NOTE — ASSESSMENT & PLAN NOTE
Patient with Paroxysmal (<7 days) atrial fibrillation which is controlled currently with Beta Blocker. Patient is currently in sinus rhythm.[id number] Score: 8. HASBLED Score:5 . Anticoagulation indicated. Anticoagulation done with eliquis.

## 2023-05-15 NOTE — SUBJECTIVE & OBJECTIVE
Interval History: Pts daughter reports that pt was active throughout the night. No attempts to climb out of bed, no danger to self, however very talkative and agitated. On interview today, patient is confused, speaking about nonsensical ideas. EEG today    Review of Systems   Unable to perform ROS: Acuity of condition   Objective:     Vital Signs (Most Recent):  Temp: 98.1 °F (36.7 °C) (05/15/23 1054)  Pulse: 89 (05/15/23 1054)  Resp: 18 (05/15/23 1054)  BP: (!) 108/59 (05/15/23 1054)  SpO2: 98 % (05/15/23 1054) Vital Signs (24h Range):  Temp:  [97.6 °F (36.4 °C)-98.6 °F (37 °C)] 98.1 °F (36.7 °C)  Pulse:  [] 89  Resp:  [16-20] 18  SpO2:  [94 %-99 %] 98 %  BP: (108-167)/(59-86) 108/59     Weight: 72.5 kg (159 lb 13.3 oz)  Body mass index is 27.44 kg/m².    Intake/Output Summary (Last 24 hours) at 5/15/2023 1209  Last data filed at 5/14/2023 1824  Gross per 24 hour   Intake 490 ml   Output 900 ml   Net -410 ml         Physical Exam  Vitals and nursing note reviewed.   Constitutional:       General: She is not in acute distress.     Appearance: Normal appearance. She is not ill-appearing.      Comments: Talkative, nonsensical speech    HENT:      Head: Normocephalic and atraumatic.   Eyes:      General: No scleral icterus.  Cardiovascular:      Rate and Rhythm: Normal rate and regular rhythm.      Heart sounds: No murmur heard.    No gallop.   Pulmonary:      Effort: Pulmonary effort is normal. No respiratory distress.      Breath sounds: Normal breath sounds.      Comments: Somewhat increased WOB  Abdominal:      General: Abdomen is flat. There is no distension.      Palpations: Abdomen is soft.   Musculoskeletal:      Cervical back: Normal range of motion. No rigidity.      Right lower leg: No edema.      Left lower leg: No edema.   Skin:     General: Skin is warm and dry.   Neurological:      Mental Status: She is alert.      Motor: Weakness present.      Comments: Oriented to person and year  Place: [place]  (worked there for 50 years)  Baseline left sided weakness; right  strength 4/5   Psychiatric:         Mood and Affect: Mood normal.         Behavior: Behavior normal.           Significant Labs: All pertinent labs within the past 24 hours have been reviewed.    Significant Imaging: I have reviewed all pertinent imaging results/findings within the past 24 hours.

## 2023-05-15 NOTE — PLAN OF CARE
Family is interested in Skilled Nursing facilities with single rooms and 4-5 CMS stars. EBONIE has provided a list for family and patient as well as sending preliminary referrals to determine availability. Pt and family will contact facilities of interest and SW will update with available facilities as soon as they are known.     Jody Soria, [facility]  Case Management Department  [email or web address]       05/15/23 1148   Post-Acute Status   Post-Acute Authorization Placement   Post-Acute Placement Status Referrals Sent   Discharge Delays None known at this time   Discharge Plan   Discharge Plan A Skilled Nursing Facility   Discharge Plan B Home with family;Home Health     PASRR and LOCET completed.    Addendum 3:51 PM    SW informed Pt's daughter, Susu, of three facilities that have so far clinically accepted Pt to Skilled Nursing. EBONIE expects others to clinically accept as well.    [facility]  [facility]

## 2023-05-15 NOTE — PROGRESS NOTES
[facility] - Cardiology [facility] Medicine  Progress Note    Patient Name: Selma Lux  MRN: [id number]  Patient Class: IP- Inpatient   Admission Date: 5/12/2023  Length of Stay: 1 days  Attending Physician: David La MD  Primary Care Provider: Génesis Rosario MD        Subjective:     Principal Problem:Acute encephalopathy        HPI:  87 y.o. female with history of subdural hematoma, drug-induced adrenal insufficiency, thyroid abnormalities, hypertension, previous stroke with residual hemiplegia, COPD, Hashimoto's, cryptogenic organizing pneumonia, RA (on plaquenil and cellcept), hip OA, paroxysmal Afib, vascular dementia, iron deficiency anemia, HTN, HLD who presents to the ED with left sided weakness times 3-4 days, general malaise, poor appetite, and nausea/vomiting. She was brought in from PCP clinic (Dr. Rosario) where she vomited, was given phenergan, IV attempted for fluid resuscitation, and became hypotensive to 95/60 prior to being brought to the ED.     At time of my evaluation, she was fatigued and dosing off, but arousable. Oriented to self, date, birthday, president, and place. Denied ongoing nausea or vomiting since being in clinic. Denies fever or chills. She and her daughters state that she has been eating, but less than before and with decreased UOP. Normally she is in a wheelchair at baseline, but has been struggling to transfer on her own which can usually do. She received vanc/zosyn and 2 L LR in the ED. Workup was significant for sodium 132, plt 86, Cr 1.6, BUN 24. UDS, troponin, TSH, UA, procal, ESR, lactate were essentially normal. Covid negative.       Overview/Hospital Course:  Patient admitted to hospital medicine for evaluation and management of acute encephalopathy worsening since Wednesday, 5/10. Likely due to dehydration, hypoglycemia, and hyponatremia in setting of poor oral intake. Admitted with KERMIT (Cr 1.6) and Na 132, which returned to baseline after  receiving 30mg/kg LR. Glucose as low as 44, repleted with IV dextrose. Also discussed possible contribution of polypharmacy with family. She takes baclofen twice daily and gabapentin nightly as well as prn for chronic muscle stiffness and tremors. Recommended decreasing medications to only use at night. Discussed some possibility of CVA recrudescence in setting of metabolic insult. Concern for infection low given her normal WBC, procal, lactate, UA, resp viral panel, and blood cultures, in addition to hemodynamic stability and afebrile. Tbili mildly elevated 1.3, RUQ US ordered out of abundance of caution in otherwise immunosuppressed individual (and Tbili 1.3), was unremarkable. EEG pending. Shortly after admission, patient returned to baseline mentation, however she then began to experience sundowning and hospital induced delirium. PT/OT recommending SNF, placement pending.       Interval History: Pts daughter reports that pt was active throughout the night. No attempts to climb out of bed, no danger to self, however very talkative and agitated. On interview today, patient is confused, speaking about nonsensical ideas. EEG today    Review of Systems   Unable to perform ROS: Acuity of condition   Objective:     Vital Signs (Most Recent):  Temp: 98.1 °F (36.7 °C) (05/15/23 1054)  Pulse: 89 (05/15/23 1054)  Resp: 18 (05/15/23 1054)  BP: (!) 108/59 (05/15/23 1054)  SpO2: 98 % (05/15/23 1054) Vital Signs (24h Range):  Temp:  [97.6 °F (36.4 °C)-98.6 °F (37 °C)] 98.1 °F (36.7 °C)  Pulse:  [] 89  Resp:  [16-20] 18  SpO2:  [94 %-99 %] 98 %  BP: (108-167)/(59-86) 108/59     Weight: 72.5 kg (159 lb 13.3 oz)  Body mass index is 27.44 kg/m².    Intake/Output Summary (Last 24 hours) at 5/15/2023 1209  Last data filed at 5/14/2023 1824  Gross per 24 hour   Intake 490 ml   Output 900 ml   Net -410 ml         Physical Exam  Vitals and nursing note reviewed.   Constitutional:       General: She is not in acute distress.      Appearance: Normal appearance. She is not ill-appearing.      Comments: Talkative, nonsensical speech    HENT:      Head: Normocephalic and atraumatic.   Eyes:      General: No scleral icterus.  Cardiovascular:      Rate and Rhythm: Normal rate and regular rhythm.      Heart sounds: No murmur heard.    No gallop.   Pulmonary:      Effort: Pulmonary effort is normal. No respiratory distress.      Breath sounds: Normal breath sounds.      Comments: Somewhat increased WOB  Abdominal:      General: Abdomen is flat. There is no distension.      Palpations: Abdomen is soft.   Musculoskeletal:      Cervical back: Normal range of motion. No rigidity.      Right lower leg: No edema.      Left lower leg: No edema.   Skin:     General: Skin is warm and dry.   Neurological:      Mental Status: She is alert.      Motor: Weakness present.      Comments: Oriented to person and year  Place: [place] (worked there for 50 years)  Baseline left sided weakness; right  strength 4/5   Psychiatric:         Mood and Affect: Mood normal.         Behavior: Behavior normal.           Significant Labs: All pertinent labs within the past 24 hours have been reviewed.    Significant Imaging: I have reviewed all pertinent imaging results/findings within the past 24 hours.      Assessment/Plan:      * Acute encephalopathy  88 yo F admitted with fatigue, worsening weakness, confusion, somnolent with poor PO intake for 3-4 days. Some nausea and vomiting as well. Metabolic and infectious workup largely negative with mild hyponatremia and KERMIT discovered.  - UA negative, CXR negative, blood cultures NGTD, COVID negative, abd US clear, respi viral panel negative, procal and lactate WNL  - CTH and MRI without acute changes  - Suspect this is a metabolic encephalopathy caused by dehydration and hypoglycemia in the setting of poor oral intake. Suspect a component of polypharmacy. Cannot rule out recrudesce from prior CVA.   - Resolved on admission,  however she is experiencing worsening mentation due to sundowning and hospital induced delirium    - F/u EEG  - Speaking with nursing staff about moving to room with more sunlight in order to encourage return of natural circadian rhythm  - Melatonin and trazodone qhs     Heart failure with preserved ejection fraction  Results for orders placed during the hospital encounter of 12/16/22    Echo Saline Bubble? No    Interpretation Summary  · The left ventricle is normal in size with concentric hypertrophy and normal systolic function. The estimated ejection fraction is 65%.  · Normal right ventricular size with normal right ventricular systolic function.  · Grade II left ventricular diastolic dysfunction.  · Severe left atrial enlargement.  · Mild-to-moderate mitral regurgitation.  · The estimated PA systolic pressure is 43 mmHg.  · Normal central venous pressure (3 mmHg).    - Pt with increased WOB 5/15. Will order CXR for evaluation, as she received fluids upon admission. May need a dose of lasix      Vascular Dementia  Noted by neuropsych Dr. Crabtree  Chronic changes and remote infarcts on MRI brain and CTH      Age-related physical debility  PT/OT ordered, recommend SNF. Placement pending.       Hyponatremia  Mild hyponatremia of 132.  Possibly a mild contribution to presentation. 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 Still has residual weakness, but feels it is worse on L side that her baseline      Hyperlipidemia  Continued statin      Pulmonary hypertension due to interstitial lung disease  Past echos have shown elevated PA pressures. Follows with pulm and cards. Does not require oxygen at baseline      KERMIT (acute kidney injury)  Patient with acute kidney injury likely due to pre-renal azotemia KERMIT is currently RESOLVED. Labs reviewed- Renal function/electrolytes with Estimated  Creatinine Clearance: 33.4 mL/min (based on SCr of 1.2 mg/dL). according to latest data. Monitor urine output and serial BMP and adjust therapy as needed. Avoid nephrotoxins and renally dose meds for GFR listed above.     Lab Results   Component Value Date    CREATININE 1.2 05/14/2023             Thrombocytopenia  Stable and without evidence of bleed          AF (paroxysmal atrial fibrillation)  Patient with Paroxysmal (<7 days) atrial fibrillation which is controlled currently with Beta Blocker. Patient is currently in sinus rhythm.[id number] Score: 8. HASBLED Score:5 . Anticoagulation indicated. Anticoagulation done with eliquis.        Hypertension, essential  Home coreg and amlodipine       Rheumatoid arthritis of multiple sites  Dx in 2012. On hydroxychloroquine, cellcept, prednisone. Had previously tried humira in 2018 followed by ICU admission for pneumonia    - continued hydroxychloroquine and prednisone  - restart cellcept given rule out of any likely infection       PUD (peptic ulcer disease)  Continue home PPI      Iron deficiency anemia secondary to inadequate dietary iron intake  Continue home iron every other day        VTE Risk Mitigation (From admission, onward)         Ordered     apixaban tablet 2.5 mg  2 times daily         05/13/23 0154     IP VTE HIGH RISK PATIENT  Once         05/13/23 0147     Place sequential compression device  Until discontinued         05/13/23 0147                Discharge Planning   LETICIA: 5/15/2023     Code Status: Full Code   Is the patient medically ready for discharge?: Yes    Reason for patient still in hospital (select all that apply): Pending disposition  Discharge Plan A: Skilled Nursing Facility   Discharge Delays: None known at this time      Nanette Ojeda DO PGY1  Department of Hospital Medicine   [facility] - Cardiology Stepdown

## 2023-05-15 NOTE — ASSESSMENT & PLAN NOTE
88 yo F admitted with fatigue, worsening weakness, confusion, somnolent with poor PO intake for 3-4 days. Some nausea and vomiting as well. Metabolic and infectious workup largely negative with mild hyponatremia and KERMIT discovered.  - UA negative, CXR negative, blood cultures NGTD, COVID negative, abd US clear, respi viral panel negative, procal and lactate WNL  - CTH and MRI without acute changes  - Suspect this is a metabolic encephalopathy caused by dehydration and hypoglycemia in the setting of poor oral intake. Suspect a component of polypharmacy. Cannot rule out recrudesce from prior CVA.   - Resolved on admission, however she is experiencing worsening mentation due to sundowning and hospital induced delirium    - F/u EEG  - Speaking with nursing staff about moving to room with more sunlight in order to encourage return of natural circadian rhythm  - Melatonin and trazodone qhs

## 2023-05-16 NOTE — ASSESSMENT & PLAN NOTE
86 yo F admitted with fatigue, worsening weakness, confusion, somnolent with poor PO intake for 3-4 days. Some nausea and vomiting as well. Metabolic and infectious workup largely negative with mild hyponatremia and KERMIT discovered.  - UA negative, CXR negative, blood cultures NGTD, COVID negative, abd US clear, respi viral panel negative, procal and lactate WNL  - CTH and MRI without acute changes. EEG with no seizure activity  - Suspect this is a metabolic encephalopathy caused by dehydration and hypoglycemia in the setting of poor oral intake. Suspect a component of polypharmacy. Cannot rule out recrudesce from prior CVA.   - Resolved on admission, however she is experiencing worsening mentation due to sundowning and hospital induced delirium    - Melatonin and trazodone qhs

## 2023-05-16 NOTE — PT/OT/SLP PROGRESS
Occupational Therapy   Treatment    Name: Selma Lux  MRN: [id number]  Admitting Diagnosis:  Acute encephalopathy       Recommendations:     Discharge Recommendations: nursing facility, skilled  Discharge Equipment Recommendations:  to be determined by next level of care  Barriers to discharge:  None    Assessment:     Selma Lux is a 87 y.o. female with a medical diagnosis of Acute encephalopathy.  She requires maximal-minimal assistance to complete EOB ADLs on this date. Patient lethargic during session with cues required to keep eyes open throughout session. Performance deficits affecting function are weakness, impaired endurance, impaired balance, impaired self care skills, impaired functional mobility, decreased coordination, impaired skin, impaired fine motor, decreased safety awareness, impaired sensation, decreased upper extremity function, decreased ROM, impaired cardiopulmonary response to activity.     Rehab Prognosis:  Fair; patient would benefit from acute skilled OT services to address these deficits and reach maximum level of function.       Plan:     Patient to be seen 3 x/week to address the above listed problems via self-care/home management, therapeutic activities, therapeutic exercises, neuromuscular re-education  Plan of Care Expires: 06/14/23  Plan of Care Reviewed with: patient, caregiver    Subjective     Chief Complaint: patient with difficulty keeping eyes open throughout session   Patient/Family Comments/goals: get better   Pain/Comfort:       Objective:     Communicated with: nursing prior to session.  Patient found HOB elevated with PureWick, telemetry upon OT entry to room. Patient's caregiver remained in room throughout entirety of session. Utilization of therapy technician during session for patient safety and promotion of functional tasks.     General Precautions: Standard, fall    Orthopedic Precautions:N/A  Braces: N/A  Respiratory Status: Room air     Occupational  Performance:     Bed Mobility:    Patient completed Rolling/Turning to Left with  total assistance  Patient completed Rolling/Turning to Right with total assistance  Patient completed Scooting/Bridging with total assistance and 2 persons  Patient completed Supine to Sit with maximal assistance and 2 persons  Patient completed Sit to Supine with maximal assistance and 2 persons   Patient sat EOB for ~8 minutes targeting static and dynamic sitting balance for improvements with activity tolerance. Patient required maximal assistance to maintain safe seated balance with intermittent bursts of minimal assistance required to reduce posterior lean. Patient responded well to neuromuscular input to improve balance.     Functional Mobility/Transfers: not attempted on this date due to functional status    Activities of Daily Living:  Grooming: minimum assistance to wash face with washcloth while sitting EOB. Patient required cueing to wash left side of face.      [facility] 6 Click ADL: 13    Treatment & Education:  Patient educated on need to      Patient educated on:   -purpose of OT and OT POC  -facilitation and education on proper body mechanics, energy conservation, and safety  -importance of early mobility and out of bed activities with staff assist  -overall benefits of therapy       Patient left HOB elevated with all lines intact, call button in reach, and caregiver present    GOALS:   Multidisciplinary Problems       Occupational Therapy Goals          Problem: Occupational Therapy    Goal Priority Disciplines Outcome Interventions   Occupational Therapy Goal     OT, PT/OT Ongoing, Progressing    Description: Goals to be met by: 5/28/23     Patient will increase functional independence with ADLs by performing:    UE Dressing with Contact Guard Assistance.  LE Dressing with Minimal Assistance.  Grooming while EOB with Contact Guard Assistance.  Toileting from bedside commode with Contact Guard Assistance for hygiene and  clothing management.   Toilet transfer to bedside commode with Minimal Assistance.                         Time Tracking:     OT Date of Treatment: 05/16/23 (session interrupted by respiratory therapist/ breathing treatment)  OT Start Time: 1008 (1021 for continuation of session)  OT Stop Time: 1012 (1038)  OT Total Time (min): 4 min--> 21 minutes total    Billable Minutes:Self Care/Home Management 21    OT/GENE: OT          5/16/2023

## 2023-05-16 NOTE — PT/OT/SLP PROGRESS
Physical Therapy      Patient Name:  Selma Lux   MRN:  [id number]    Patient not seen today secondary to Other (Comment). Pt is scheduled for d/c.  PT will attempt should patient remain admitted to hospital.  Will follow-up when appropriate.

## 2023-05-16 NOTE — PROGRESS NOTES
[facility] - Cardiology [facility] Medicine  Progress Note    Patient Name: Selma Lux  MRN: [id number]  Patient Class: IP- Inpatient   Admission Date: 5/12/2023  Length of Stay: 2 days  Attending Physician: David La MD  Primary Care Provider: Génesis Rosario MD        Subjective:     Principal Problem:Acute encephalopathy        HPI:  87 y.o. female with history of subdural hematoma, drug-induced adrenal insufficiency, thyroid abnormalities, hypertension, previous stroke with residual hemiplegia, COPD, Hashimoto's, cryptogenic organizing pneumonia, RA (on plaquenil and cellcept), hip OA, paroxysmal Afib, vascular dementia, iron deficiency anemia, HTN, HLD who presents to the ED with left sided weakness times 3-4 days, general malaise, poor appetite, and nausea/vomiting. She was brought in from PCP clinic (Dr. Rosario) where she vomited, was given phenergan, IV attempted for fluid resuscitation, and became hypotensive to 95/60 prior to being brought to the ED.     At time of my evaluation, she was fatigued and dosing off, but arousable. Oriented to self, date, birthday, president, and place. Denied ongoing nausea or vomiting since being in clinic. Denies fever or chills. She and her daughters state that she has been eating, but less than before and with decreased UOP. Normally she is in a wheelchair at baseline, but has been struggling to transfer on her own which can usually do. She received vanc/zosyn and 2 L LR in the ED. Workup was significant for sodium 132, plt 86, Cr 1.6, BUN 24. UDS, troponin, TSH, UA, procal, ESR, lactate were essentially normal. Covid negative.       Overview/Hospital Course:  Patient admitted to hospital medicine for evaluation and management of acute encephalopathy worsening since Wednesday, 5/10. Likely due to dehydration, hypoglycemia, and hyponatremia in setting of poor oral intake. Admitted with KERMIT (Cr 1.6) and Na 132, which returned to baseline after  receiving 30mg/kg LR. Glucose as low as 44, repleted with IV dextrose. Also discussed possible contribution of polypharmacy with family. She takes baclofen twice daily and gabapentin nightly as well as prn for chronic muscle stiffness and tremors. Recommended decreasing medications to only use at night. Discussed some possibility of CVA recrudescence in setting of metabolic insult. Concern for infection low given her normal WBC, procal, lactate, UA, resp viral panel, and blood cultures, in addition to hemodynamic stability and afebrile. Tbili mildly elevated 1.3, RUQ US ordered out of abundance of caution in otherwise immunosuppressed individual (and Tbili 1.3), was unremarkable. EEG without evidence of seizure. Shortly after admission, patient returned to baseline mentation, however she then began to experience sundowning and hospital induced delirium. She will discharge to SNF based on PT/OT recommendations.    Constitutional:       General: sleepy, no apparent distress  HENT:      Head: Normocephalic and atraumatic.   Cardiovascular:      Rate and Rhythm: Normal rate and regular rhythm.      Heart sounds: Normal heart sounds.   Pulmonary:      Effort: Pulmonary effort is normal.      Breath sounds: Normal breath sounds.   Musculoskeletal:      Cervical back: Normal range of motion.   Skin:     General: Skin is warm and dry   Neurological:      Mental Status: oriented to person, place, and time.   Psychiatric:         Mood and Affect: Mood normal.         Behavior: Behavior normal.             Interval History: NAEON. Pt doing much better today since moving rooms. SNF pending.     Review of Systems   Unable to perform ROS: Acuity of condition   Objective:     Vital Signs (Most Recent):  Temp: 96.2 °F (35.7 °C) (05/16/23 1147)  Pulse: 95 (05/16/23 1147)  Resp: 16 (05/16/23 1147)  BP: (!) 103/50 (05/16/23 1147)  SpO2: 98 % (05/16/23 1147) Vital Signs (24h Range):  Temp:  [96.2 °F (35.7 °C)-98.6 °F (37 °C)] 96.2 °F  (35.7 °C)  Pulse:  [] 95  Resp:  [16-20] 16  SpO2:  [92 %-98 %] 98 %  BP: (103-150)/(50-87) 103/50     Weight: 72.5 kg (159 lb 13.3 oz)  Body mass index is 27.44 kg/m².    Intake/Output Summary (Last 24 hours) at 5/16/2023 1516  Last data filed at 5/16/2023 0523  Gross per 24 hour   Intake --   Output 350 ml   Net -350 ml         Physical Exam  Vitals and nursing note reviewed.   Constitutional:       General: She is not in acute distress.     Appearance: Normal appearance. She is not ill-appearing.   HENT:      Head: Normocephalic and atraumatic.   Eyes:      General: No scleral icterus.  Cardiovascular:      Rate and Rhythm: Normal rate and regular rhythm.      Heart sounds: No murmur heard.    No gallop.   Pulmonary:      Effort: Pulmonary effort is normal. No respiratory distress.      Breath sounds: Normal breath sounds. No wheezing or rales.   Abdominal:      General: Abdomen is flat. There is no distension.      Palpations: Abdomen is soft.   Musculoskeletal:      Cervical back: Normal range of motion. No rigidity.      Right lower leg: No edema.      Left lower leg: No edema.   Skin:     General: Skin is warm and dry.   Neurological:      Mental Status: She is alert.      Motor: Weakness present.      Comments: A&O to person, place, and year   Psychiatric:         Mood and Affect: Mood normal.         Behavior: Behavior normal.           Significant Labs: All pertinent labs within the past 24 hours have been reviewed.    Significant Imaging: I have reviewed all pertinent imaging results/findings within the past 24 hours.      Assessment/Plan:      * Acute encephalopathy  88 yo F admitted with fatigue, worsening weakness, confusion, somnolent with poor PO intake for 3-4 days. Some nausea and vomiting as well. Metabolic and infectious workup largely negative with mild hyponatremia and KERMIT discovered.  - UA negative, CXR negative, blood cultures NGTD, COVID negative, abd US clear, respi viral panel  negative, procal and lactate WNL  - CTH and MRI without acute changes. EEG with no seizure activity  - Suspect this is a metabolic encephalopathy caused by dehydration and hypoglycemia in the setting of poor oral intake. Suspect a component of polypharmacy. Cannot rule out recrudesce from prior CVA.   - Resolved on admission, however she is experiencing worsening mentation due to sundowning and hospital induced delirium    - Melatonin and trazodone qhs     Heart failure with preserved ejection fraction  Results for orders placed during the hospital encounter of 12/16/22    Echo Saline Bubble? No    Interpretation Summary  · The left ventricle is normal in size with concentric hypertrophy and normal systolic function. The estimated ejection fraction is 65%.  · Normal right ventricular size with normal right ventricular systolic function.  · Grade II left ventricular diastolic dysfunction.  · Severe left atrial enlargement.  · Mild-to-moderate mitral regurgitation.  · The estimated PA systolic pressure is 43 mmHg.  · Normal central venous pressure (3 mmHg).    - Pt with increased WOB 5/15. Will order CXR for evaluation, as she received fluids upon admission. May need a dose of lasix      Vascular Dementia  Noted by neuropsych Dr. Crabtree  Chronic changes and remote infarcts on MRI brain and CTH      Age-related physical debility  PT/OT ordered, recommend SNF. Placement pending.       Hyponatremia  Mild hyponatremia of 132.  Possibly a mild contribution to presentation. 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 Still has residual weakness, but feels it is worse on L side that her baseline      Hyperlipidemia  Continued statin      Pulmonary hypertension due to interstitial lung disease  Past echos have shown elevated PA pressures. Follows with pulm and cards. Does not require oxygen at  baseline      KERMIT (acute kidney injury)  Patient with acute kidney injury likely due to pre-renal azotemia KERMIT is currently RESOLVED. Labs reviewed- Renal function/electrolytes with Estimated Creatinine Clearance: 33.4 mL/min (based on SCr of 1.2 mg/dL). according to latest data. Monitor urine output and serial BMP and adjust therapy as needed. Avoid nephrotoxins and renally dose meds for GFR listed above.     Lab Results   Component Value Date    CREATININE 1.2 05/14/2023             Thrombocytopenia  Stable and without evidence of bleed          AF (paroxysmal atrial fibrillation)  Patient with Paroxysmal (<7 days) atrial fibrillation which is controlled currently with Beta Blocker. Patient is currently in sinus rhythm.[id number] Score: 8. HASBLED Score:5 . Anticoagulation indicated. Anticoagulation done with eliquis.        Hypertension, essential  Home coreg and amlodipine       Rheumatoid arthritis of multiple sites  Dx in 2012. On hydroxychloroquine, cellcept, prednisone. Had previously tried humira in 2018 followed by ICU admission for pneumonia    - continued hydroxychloroquine and prednisone  - restart cellcept given rule out of any likely infection       PUD (peptic ulcer disease)  Continue home PPI      Iron deficiency anemia secondary to inadequate dietary iron intake  Continue home iron every other day        VTE Risk Mitigation (From admission, onward)         Ordered     apixaban tablet 2.5 mg  2 times daily         05/13/23 0154     IP VTE HIGH RISK PATIENT  Once         05/13/23 0147     Place sequential compression device  Until discontinued         05/13/23 0147                Discharge Planning   LETICIA: 5/16/2023     Code Status: Full Code   Is the patient medically ready for discharge?: Yes    Reason for patient still in hospital (select all that apply): Patient trending condition  Discharge Plan A: Skilled Nursing Facility   Discharge Delays: None known at this time    Nanette Ojeda, DO  PGY1  Department of Hospital Medicine   [facility] - Cardiology Stepdown

## 2023-05-16 NOTE — ASSESSMENT & PLAN NOTE
Patient with Paroxysmal (<7 days) atrial fibrillation which is controlled currently with Beta Blocker. Patient is currently in sinus rhythm.[id number] Score: 8. HASBLED Score:5 . Anticoagulation indicated. Anticoagulation done with eliquis.

## 2023-05-16 NOTE — SUBJECTIVE & OBJECTIVE
Interval History: NAEON. Pt doing much better today since moving rooms. SNF pending.     Review of Systems   Unable to perform ROS: Acuity of condition   Objective:     Vital Signs (Most Recent):  Temp: 96.2 °F (35.7 °C) (05/16/23 1147)  Pulse: 95 (05/16/23 1147)  Resp: 16 (05/16/23 1147)  BP: (!) 103/50 (05/16/23 1147)  SpO2: 98 % (05/16/23 1147) Vital Signs (24h Range):  Temp:  [96.2 °F (35.7 °C)-98.6 °F (37 °C)] 96.2 °F (35.7 °C)  Pulse:  [] 95  Resp:  [16-20] 16  SpO2:  [92 %-98 %] 98 %  BP: (103-150)/(50-87) 103/50     Weight: 72.5 kg (159 lb 13.3 oz)  Body mass index is 27.44 kg/m².    Intake/Output Summary (Last 24 hours) at 5/16/2023 1516  Last data filed at 5/16/2023 0523  Gross per 24 hour   Intake --   Output 350 ml   Net -350 ml         Physical Exam  Vitals and nursing note reviewed.   Constitutional:       General: She is not in acute distress.     Appearance: Normal appearance. She is not ill-appearing.   HENT:      Head: Normocephalic and atraumatic.   Eyes:      General: No scleral icterus.  Cardiovascular:      Rate and Rhythm: Normal rate and regular rhythm.      Heart sounds: No murmur heard.    No gallop.   Pulmonary:      Effort: Pulmonary effort is normal. No respiratory distress.      Breath sounds: Normal breath sounds. No wheezing or rales.   Abdominal:      General: Abdomen is flat. There is no distension.      Palpations: Abdomen is soft.   Musculoskeletal:      Cervical back: Normal range of motion. No rigidity.      Right lower leg: No edema.      Left lower leg: No edema.   Skin:     General: Skin is warm and dry.   Neurological:      Mental Status: She is alert.      Motor: Weakness present.      Comments: A&O to person, place, and year   Psychiatric:         Mood and Affect: Mood normal.         Behavior: Behavior normal.           Significant Labs: All pertinent labs within the past 24 hours have been reviewed.    Significant Imaging: I have reviewed all pertinent imaging  results/findings within the past 24 hours.

## 2023-05-16 NOTE — PLAN OF CARE
CHW met with patient/family at bedside. Patient experience rounding completed and reviewed the following.     Do you know your discharge plan? Yes         If yes, what is the plan?  SNF    Have you discussed your needs and preferences with your SW/CM? Yes      Assigned SW/CM notified of any patient/family needs or concerns.

## 2023-05-16 NOTE — DISCHARGE SUMMARY
[facility]ons - Cardiology [facility] Medicine  Discharge Summary      Patient Name: Selma Lux  MRN: [id number]  JAZZMINE: [id number]  Patient Class: IP- Inpatient  Admission Date: 5/12/2023  Hospital Length of Stay: 2 days  Discharge Date and Time:  05/16/2023 12:54 PM  Attending Physician: David La MD   Discharging Provider: Nanette Ojeda DO  Primary Care Provider: Génesis Rosario MD  Hospital Medicine Team: [facility] MED 1 Nanette Ojeda DO  Primary Care Team: [facility] MED 1    HPI:   87 y.o. female with history of subdural hematoma, drug-induced adrenal insufficiency, thyroid abnormalities, hypertension, previous stroke with residual hemiplegia, COPD, Hashimoto's, cryptogenic organizing pneumonia, RA (on plaquenil and cellcept), hip OA, paroxysmal Afib, vascular dementia, iron deficiency anemia, HTN, HLD who presents to the ED with left sided weakness times 3-4 days, general malaise, poor appetite, and nausea/vomiting. She was brought in from PCP clinic (Dr. Rosairo) where she vomited, was given phenergan, IV attempted for fluid resuscitation, and became hypotensive to 95/60 prior to being brought to the ED.     At time of my evaluation, she was fatigued and dosing off, but arousable. Oriented to self, date, birthday, president, and place. Denied ongoing nausea or vomiting since being in clinic. Denies fever or chills. She and her daughters state that she has been eating, but less than before and with decreased UOP. Normally she is in a wheelchair at baseline, but has been struggling to transfer on her own which can usually do. She received vanc/zosyn and 2 L LR in the ED. Workup was significant for sodium 132, plt 86, Cr 1.6, BUN 24. UDS, troponin, TSH, UA, procal, ESR, lactate were essentially normal. Covid negative.       * No surgery found *      Hospital Course:   Patient admitted to hospital medicine for evaluation and management of acute encephalopathy worsening since Wednesday,  5/10. Likely due to dehydration, hypoglycemia, and hyponatremia in setting of poor oral intake. Admitted with KERMIT (Cr 1.6) and Na 132, which returned to baseline after receiving 30mg/kg LR. Glucose as low as 44, repleted with IV dextrose. Also discussed possible contribution of polypharmacy with family. She takes baclofen twice daily and gabapentin nightly as well as prn for chronic muscle stiffness and tremors. Recommended decreasing medications to only use at night. Discussed some possibility of CVA recrudescence in setting of metabolic insult. Concern for infection low given her normal WBC, procal, lactate, UA, resp viral panel, and blood cultures, in addition to hemodynamic stability and afebrile. Tbili mildly elevated 1.3, RUQ US ordered out of abundance of caution in otherwise immunosuppressed individual, was unremarkable. EEG without evidence of seizure. Shortly after admission, patient returned to baseline mentation, however she then began to experience sundowning and hospital induced delirium. She will discharge to SNF based on PT/OT recommendations.    Constitutional:       General: sleepy, no apparent distress  HENT:      Head: Normocephalic and atraumatic.   Cardiovascular:      Rate and Rhythm: Normal rate and regular rhythm.      Heart sounds: Normal heart sounds.   Pulmonary:      Effort: Pulmonary effort is normal.      Breath sounds: Normal breath sounds.   Musculoskeletal:      Cervical back: Normal range of motion.   Skin:     General: Skin is warm and dry   Neurological:      Mental Status: oriented to person, place, and time.   Psychiatric:         Mood and Affect: Mood normal.         Behavior: Behavior normal.       Physical Exam  Vitals and nursing note reviewed.   Constitutional:       General: She is not in acute distress.     Appearance: Normal appearance. She is not ill-appearing.   HENT:      Head: Normocephalic and atraumatic.   Eyes:      General: No scleral icterus.  Cardiovascular:       Rate and Rhythm: Normal rate and regular rhythm.      Heart sounds: No murmur heard.    No gallop.   Pulmonary:      Effort: Pulmonary effort is normal. No respiratory distress.      Breath sounds: Normal breath sounds. No wheezing or rales.   Abdominal:      General: Abdomen is flat. There is no distension.      Palpations: Abdomen is soft.   Musculoskeletal:      Cervical back: Normal range of motion. No rigidity.      Right lower leg: No edema.      Left lower leg: No edema.   Skin:     General: Skin is warm and dry.   Neurological:      Mental Status: She is alert.      Motor: Weakness present.      Comments: A&O to person, place, and year   Psychiatric:         Mood and Affect: Mood normal.         Behavior: Behavior normal.        Goals of Care Treatment Preferences:  Code Status: Full Code    Living Will: Yes     What is most important right now is to focus on spending time at home, avoiding the hospital, symptom/pain control.  Accordingly, we have decided that the best plan to meet the patient's goals includes continuing with treatment.      Consults:     No new Assessment & Plan notes have been filed under this hospital service since the last note was generated.  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 [I69.154] 08/03/2021 Not Applicable     Chronic    Hyperlipidemia [E78.5] 10/17/2019 Yes     Chronic    Pulmonary hypertension due to interstitial lung disease [I27.23, J84.9] 03/14/2019 Yes     Chronic    KERMIT (acute kidney injury) [N17.9] 03/13/2019 Yes     Thrombocytopenia [D69.6] 12/16/2018 Yes     Chronic    AF (paroxysmal atrial fibrillation) [I48.0] 06/29/2018 Yes     Chronic    Hypertension, essential [I10] 06/16/2016 Yes     Chronic    Rheumatoid arthritis of multiple sites [M05.79] 10/21/2015 Yes     Chronic    PUD (peptic ulcer disease) [K27.9] 03/11/2014 Yes     Chronic    Iron deficiency anemia secondary to inadequate dietary iron intake [D50.8] 06/24/2013 Yes     Chronic      Problems Resolved During this Admission:    Diagnosis Date Noted Date Resolved POA    Somnolence [R40.0] 05/13/2023 05/13/2023 Unknown       Discharged Condition: stable    Disposition: Skilled Nursing Facility    Follow Up:   Follow-up Information       Génesis Rosario MD Follow up.    Specialty: Internal Medicine  Contact information:  [address]  [facility] [address]  [phone]                           Patient Instructions:   No discharge procedures on file.    Significant Diagnostic Studies: Microbiology:   Blood Culture   Lab Results   Component Value Date    LABBLOO No Growth to date 05/12/2023    LABBLOO No Growth to date 05/12/2023    LABBLOO No Growth to date 05/12/2023    LABBLOO No Growth to date 05/12/2023       Pending Diagnostic Studies:       Procedure Component Value Units Date/Time    Legionella antigen, urine [[id number]] Collected: 05/13/23 1058    Order Status: Sent Lab Status: In process Updated: 05/13/23 1059    Specimen: Urine, Clean Catch            Medications:  Reconciled Home Medications:      Medication List        START taking these medications      predniSONE 10 MG tablet  Commonly known as: DELTASONE  Take 1 tablet (10 mg total) by mouth once daily.            CHANGE how you take these medications      amLODIPine 5 MG tablet  Commonly known as: NORVASC  Take 1 tablet (5 mg total) by mouth once daily.  What changed:   medication strength  how much to take  when to take this     atorvastatin 40 MG tablet  Commonly known as: LIPITOR  TAKE  1 TABLET(40 MG) BY MOUTH EVERY DAY  What changed: when to take this     baclofen 10 MG tablet  Commonly known as: LIORESAL  Take 1 tablet (10 mg total) by mouth nightly as needed (muscle spasms).  What changed: See the new instructions.     * gabapentin 100 MG capsule  Commonly known as: NEURONTIN  Take 1 capsule (100 mg total) by mouth nightly as needed (neuropathy). Take one 100mg capsule with one 300mg capsule at bedtime as needed for neuropathy  What changed:   when to take this  reasons to take this  additional instructions     * gabapentin 300 MG capsule  Commonly known as: NEURONTIN  Take 1 capsule (300 mg total) by mouth nightly as needed (neuropathy).  What changed:   when to take this  reasons to take this     LIDOcaine 5 %  Commonly known as: LIDODERM  Place 1 patch onto the skin once daily. Remove & Discard patch within 12 hours as needed for pain or as directed by MD    Place patch onto lower back as needed  What changed: additional instructions           * This list has 2 medication(s) that are the same as other medications prescribed for you. Read the directions carefully, and ask your doctor or other care provider to review them with you.                CONTINUE taking these medications      acetaminophen 500 MG tablet  Commonly known as: TYLENOL  Take 2 tablets (1,000 mg total) by mouth 2 (two) times daily as needed (Headache).     albuterol-ipratropium 2.5 mg-0.5 mg/3 mL nebulizer solution  Commonly known as: DUO-NEB  Take 3 mLs by nebulization 2 (two) times daily as needed for Shortness of Breath. Rescue     apixaban 2.5 mg Tab  Commonly known as: ELIQUIS  Take 1 tablet (2.5 mg total) by mouth 2 (two) times daily.     carvediloL 3.125 MG tablet  Commonly known as: COREG  Take 1 tablet (3.125 mg total) by mouth 2 (two) times daily with meals.     ferrous sulfate, dried 160 mg (50 mg iron) Tbsr  Commonly known as: SLOW FE  Take 1 tablet (160 mg total) by mouth every other day.     gabapentin 5%  baclofen 2% amitriptyline 2% topical cream  Apply topically 3 (three) times daily.     hydrOXYchloroQUINE 200 mg tablet  Commonly known as: PLAQUENIL  Take 1 tablet (200 mg total) by mouth 2 (two) times daily.     magnesium oxide 400 mg (241.3 mg magnesium) tablet  Commonly known as: MAG-OX  Take 400 mg by mouth once daily.     montelukast 10 mg tablet  Commonly known as: SINGULAIR  Take 1 tablet (10 mg total) by mouth once daily.     mycophenolate 500 mg Tab  Commonly known as: CELLCEPT  Take 1 tablet (500 mg total) by mouth 2 (two) times daily.     pantoprazole 40 MG tablet  Commonly known as: PROTONIX  Take 1 tablet (40 mg total) by mouth once daily.     sulfamethoxazole-trimethoprim 800-160mg 800-160 mg Tab  Commonly known as: BACTRIM DS  One tablet by mouth every Monday, Wednesday, Friday to prevent lung infection     thiamine 100 MG tablet  Take 100 mg by mouth once daily.     traZODone 50 MG tablet  Commonly known as: DESYREL  Take 1 tablet (50 mg total) by mouth nightly as needed for Insomnia.              Indwelling Lines/Drains at time of discharge:   Lines/Drains/Airways       None                   Time spent on the discharge of patient: 45 minutes         Kaity Hoffmann MD PGY2  Department of Hospital Medicine  [facility] - Cardiology Stepdown

## 2023-05-16 NOTE — PLAN OF CARE
Patient accepted to [facility] and [facility]. [facility] chose [facility]. Awaiting authorization.     Jody Soria, [facility] [place] [place]  Case Management Department  [email or web address]     05/16/23 1711   Post-Acute Status   Post-Acute Authorization Placement   Post-Acute Placement Status Pending payor review/awaiting authorization (if required)   Discharge Delays None known at this time   Discharge Plan   Discharge Plan A Skilled Nursing Facility   Discharge Plan B Skilled Nursing Facility        Podiatry Pager #: [phone]  Patient is a 61y old  Male who presents with a chief complaint of non-healing R foot wound (23 Dec 2018 11:38)      INTERVAL HPI/OVERNIGHT EVENTS:   Pt is scheduled for right foot wound debridement with application of biologic graft with Dr. Melissa at 1:30 pm. Patient is aware of procedure and is NPO since midnight.    MEDICATIONS  (STANDING):  aspirin enteric coated 81 milliGRAM(s) Oral daily  atorvastatin 40 milliGRAM(s) Oral at bedtime  carvedilol 12.5 milliGRAM(s) Oral every 12 hours  collagenase Ointment 1 Application(s) Topical daily  dextrose 5%. 1000 milliLiter(s) (50 mL/Hr) IV Continuous <Continuous>  dextrose 50% Injectable 12.5 Gram(s) IV Push once  dextrose 50% Injectable 25 Gram(s) IV Push once  dextrose 50% Injectable 25 Gram(s) IV Push once  docusate sodium 100 milliGRAM(s) Oral two times a day  gabapentin 100 milliGRAM(s) Oral daily  heparin  Infusion.  Unit(s)/Hr (10 mL/Hr) IV Continuous <Continuous>  insulin lispro (HumaLOG) corrective regimen sliding scale   SubCutaneous three times a day before meals  insulin lispro (HumaLOG) corrective regimen sliding scale   SubCutaneous at bedtime  lisinopril 40 milliGRAM(s) Oral daily  sodium chloride 0.9%. 1000 milliLiter(s) (25 mL/Hr) IV Continuous <Continuous>    MEDICATIONS  (PRN):  dextrose 40% Gel 15 Gram(s) Oral once PRN Blood Glucose LESS THAN 70 milliGRAM(s)/deciliter  glucagon  Injectable 1 milliGRAM(s) IntraMuscular once PRN Glucose LESS THAN 70 milligrams/deciliter  heparin  Injectable 4000 Unit(s) IV Push every 6 hours PRN For aPTT less than 40  heparin  Injectable 2000 Unit(s) IV Push every 6 hours PRN For aPTT between 40 - 57      Allergies    penicillin (Other)    Intolerances        Vital Signs Last 24 Hrs  T(C): 36.8 (24 Dec 2018 06:10), Max: 36.8 (23 Dec 2018 21:07)  T(F): 98.2 (24 Dec 2018 06:10), Max: 98.2 (23 Dec 2018 21:07)  HR: 67 (24 Dec 2018 06:10) (67 - 85)  BP: 119/60 (24 Dec 2018 06:10) (100/48 - 126/78)  BP(mean): --  RR: 18 (24 Dec 2018 06:10) (18 - 18)  SpO2: 100% (24 Dec 2018 06:10) (100% - 100%)    LABS:                        10.5   10.17 )-----------( 340      ( 23 Dec 2018 06:20 )             33.2     12-23    138  |  105  |  65<H>  ----------------------------<  185<H>  4.5   |  21<L>  |  1.96<H>    Ca    9.0      23 Dec 2018 06:20  Mg     2.4     12-23      PTT - ( 23 Dec 2018 06:20 )  PTT:70.5 SEC    CAPILLARY BLOOD GLUCOSE      POCT Blood Glucose.: 172 mg/dL (23 Dec 2018 22:18)  POCT Blood Glucose.: 207 mg/dL (23 Dec 2018 17:45)  POCT Blood Glucose.: 140 mg/dL (23 Dec 2018 12:31)  POCT Blood Glucose.: 160 mg/dL (23 Dec 2018 08:40)

## 2023-05-17 NOTE — PROGRESS NOTES
05/16/23 2330 05/17/23 0503   Urine Assessment   $ Bladder Scan Bedside Performed Bedside Performed   Bladder Scan Volume (mL) 246 mL 296 mL       Notified Physician with Dr. Lama with [facility] med 1. Will review chart. No interventions at this moment.

## 2023-05-17 NOTE — PLAN OF CARE
[facility] - Cardiology Stepdown  Discharge Final Note    Primary Care Provider: Génesis Rosario MD    Expected Discharge Date: 5/17/2023    Final Discharge Note (most recent)       Final Note - 05/17/23 1624          Final Note    Assessment Type Final Discharge Note     Anticipated Discharge Disposition Skilled Nursing Facility        Post-Acute Status    Post-Acute Authorization Placement     Post-Acute Placement Status Set-up Complete/Auth obtained                 Transportation scheduled via stretcher for 5:30pm to transfer to Critical access hospital[facility].  MARILYN Wood to call report to [phone], room 140.  MARILYN Wood was notified of the above.        Alyssa Lu LMSW  [facility]  [id number]      Important Message from Medicare  Important Message from Medicare regarding Discharge Appeal Rights: Given to patient/caregiver, Explained to patient/caregiver, Signed/date by patient/caregiver, Other (comments) (Caregiver signed)     Date IMM was signed: 05/16/23  Time IMM was signed: 1010    Contact Info       Génesis Rosario MD   Specialty: Internal Medicine   Relationship: PCP - General    [address]  [facility] [address]   Phone: [phone]       Next Steps: Follow up

## 2023-05-17 NOTE — PLAN OF CARE
Pt A/O x 4/ Remains in bed throughout the night, Remains free for falls and injuries. Complaints of headache pt received 1000mg PO tylenol, N/V Educated on the plan of care tonight. No questions at this time.  Will be discharged to SNF today.     Problem: Adult Inpatient Plan of Care  Goal: Plan of Care Review  Outcome: Ongoing, Progressing  Goal: Patient-Specific Goal (Individualized)  Outcome: Ongoing, Progressing  Goal: Absence of Hospital-Acquired Illness or Injury  Outcome: Ongoing, Progressing  Goal: Optimal Comfort and Wellbeing  Outcome: Ongoing, Progressing  Goal: Readiness for Transition of Care  Outcome: Ongoing, Progressing     Problem: Adjustment to Illness (Delirium)  Goal: Optimal Coping  Outcome: Ongoing, Progressing     Problem: Altered Behavior (Delirium)  Goal: Improved Behavioral Control  Outcome: Ongoing, Progressing     Problem: Attention and Thought Clarity Impairment (Delirium)  Goal: Improved Attention and Thought Clarity  Outcome: Ongoing, Progressing     Problem: Fall Injury Risk  Goal: Absence of Fall and Fall-Related Injury  Outcome: Ongoing, Progressing     Problem: Skin Injury Risk Increased  Goal: Skin Health and Integrity  Outcome: Ongoing, Progressing

## 2023-05-17 NOTE — PLAN OF CARE
Patient is ready for discharge to [facility]. Report called for patient.  Patient stable alert and oriented. IVs removed. No complaints of pain. Discussed discharge plan.     Problem: Adult Inpatient Plan of Care  Goal: Plan of Care Review  Outcome: Met  Goal: Patient-Specific Goal (Individualized)  Outcome: Met  Goal: Absence of Hospital-Acquired Illness or Injury  Outcome: Met  Goal: Optimal Comfort and Wellbeing  Outcome: Met  Goal: Readiness for Transition of Care  Outcome: Met     Problem: Adjustment to Illness (Delirium)  Goal: Optimal Coping  Outcome: Met     Problem: Altered Behavior (Delirium)  Goal: Improved Behavioral Control  Outcome: Met     Problem: Attention and Thought Clarity Impairment (Delirium)  Goal: Improved Attention and Thought Clarity  Outcome: Met     Problem: Sleep Disturbance (Delirium)  Goal: Improved Sleep  Outcome: Met     Problem: Fluid and Electrolyte Imbalance (Acute Kidney Injury/Impairment)  Goal: Fluid and Electrolyte Balance  Outcome: Met     Problem: Oral Intake Inadequate (Acute Kidney Injury/Impairment)  Goal: Optimal Nutrition Intake  Outcome: Met     Problem: Renal Function Impairment (Acute Kidney Injury/Impairment)  Goal: Effective Renal Function  Outcome: Met     Problem: Fall Injury Risk  Goal: Absence of Fall and Fall-Related Injury  Outcome: Met     Problem: Skin Injury Risk Increased  Goal: Skin Health and Integrity  Outcome: Met

## 2023-05-18 NOTE — PHYSICIAN QUERY
Withdrawn, MP  Metabolic Encephalopathy listed on last HM PN on day of discharge, no longer needed.    PT Name: Selma Lux  MR #: [id number]    DOCUMENTATION CLARIFICATION     CDS/:  Shawn Marques RN, CDS                   Contact Information:  [email or web address]  This form is a permanent document in the medical record.     Query Date: May 18, 2023    By submitting this query, we are merely seeking further clarification of documentation. Please utilize your independent clinical judgment when addressing the question(s) below.    The Medical Record contains the following:   Indicators   Supporting Clinical Findings     Location in Medical Record   X AMS, Confusion,  LOC, etc.  Patient admitted to hospital medicine for evaluation and management of acute encephalopathy worsening since Wednesday, 5/10. Likely due to dehydration, hypoglycemia, and hyponatremia in setting of poor oral intake    88 yo F admitted with fatigue, worsening weakness, confusion, somnolent with poor PO intake for 3-4 days   DCS 5/17           PN 5/16   X Acute/Chronic Illness Acute encephalopathy   Heart failure with preserved ejection fraction   KERMIT (acute kidney injury)   Hyponatremia   Vascular Dementia   Hyperlipidemia   Pulmonary hypertension due to interstitial lung disease   Thrombocytopenia   AF (paroxysmal atrial fibrillation)   Hypertension, essential   Rheumatoid arthritis of multiple sites   PUD (peptic ulcer disease)     PN 5/16    Radiology Findings     X Electrolyte Imbalance Sodium 132    Creatinine 1.7    Glucose 44 Labs 5/12        Labs 5/13     X Medication IVF    IV dextrose     MAR   X Treatment         KERMIT (Cr 1.6) and Na 132, which returned to baseline after receiving 30mg/kg LR. Glucose as low as 44, repleted with IV dextrose    PN 5/16     X Other She was seen at her PCP where she had an episode of emesis, she received IV Phenergan and became hypotensive to 95/50 and was referred to the  "emergency room    Suspect this is a metabolic encephalopathy caused by dehydration and hypoglycemia in the setting of poor oral intake.     Also discussed possible contribution of polypharmacy with family. She takes baclofen twice daily and gabapentin nightly as well as prn for chronic muscle stiffness and tremors. Recommended decreasing medications to only use at night. Discussed some possibility of CVA recrudescence in setting of metabolic insult.    Admitted with KERMIT (Cr 1.6) and Na 132, which returned to baseline after receiving 30mg/kg LR. Glucose as low as 44, repleted with IV dextrose.     Shortly after admission, patient returned to baseline mentation   DCS 5/17         The noted clinical guidelines are only system guidelines and do not replace the providers clinical judgment.    The National [place] of Neurologic Disorders and Stroke (NINDS) of the NIH describes encephalopathy as any diffuse disease of the brain that alters brain function or structure.    Provider, please clarify the diagnosis of "Acute Encephalopathy":    [   ] Metabolic Encephalopathy Likely due to dehydration, hypoglycemia, and hyponatremia in setting of poor oral intake     [   ] Other Encephalopathy (please specify): ____________________     [   ] Other neurological condition (please specify condition): __________         Please document in your progress notes daily for the duration of treatment until resolved, and include in your discharge summary.    References:  ASHU Gimenez RN, CCDS. (2018, June 9). Notes from the Instructor: Encephalopathy tips. Retrieved October 22, 2020, from [email or web address].org/articles/note-instructor-encephalopathy-tips    ICD-9-CM Coding Clinic First Quarter 2013, Effective with discharges: October 21, 2013 [facility] § Seizure with encephalopathy due to postictal state (2013).    ICD-10-CM/PCS Remedify Integrated Codebook (Version V.20.8.10.0) [Computer software]. (2020). Retrieved October 21, " 2020.    National [place] of Neurological Disorders and Stroke. (2019, March 27). Retrieved October 22, 2020, from https://www.ninds.nih.gov/Disorders/All-Disorders/Whcqqtoxbbjrqd-Xwstuunaxwr-Vrcc    Form No. 86024

## 2023-05-21 NOTE — ASSESSMENT & PLAN NOTE
--trend   [de-identified] : wet cough [FreeTextEntry6] : presents with a wet cough x 1 week no fever no respiratory distress\par taking mucinex

## 2023-05-24 NOTE — TELEPHONE ENCOUNTER
Sw attempted to reach CG, Dr. Sethi, to check in regarding pt status.  Chart review reveals pt was d/c to SNF from in patient hospital stay. LVM. SW remains available.

## 2023-06-01 NOTE — SUBJECTIVE & OBJECTIVE
Past Medical History:   Diagnosis Date    Acute cystitis without hematuria 2/27/2020    Acute hypoxemic respiratory failure 4/11/2018    Acute respiratory failure with hypoxia 3/2/2020    Altered mental status     Anemia     Arthritis     Bilateral hand pain 3/14/2018    Branch retinal vein occlusion, left eye 2/20/2015    Chronic bilateral low back pain without sciatica 3/23/2017    Chronic renal failure in pediatric patient, stage 3 (moderate) 4/15/2018    Cognitive communication deficit 12/19/2017    Cystoid macular edema, left eye 2/20/2015    Cystoid macular edema, left eye 2/20/2015    Delirium 12/30/2021    DJD (degenerative joint disease) of cervical spine 5/15/2013    Enterococcal bacteremia     Fatty liver 8/26/2014    Goiter 4/9/2018    Hashimoto's disease     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    Hypertension     Hypertensive encephalopathy     IBS (irritable bowel syndrome) 6/21/2017    IGT (impaired glucose tolerance) 1/12/2016    Iron deficiency anemia secondary to inadequate dietary iron intake 6/24/2013    Joint pain     Keratoconjunctivitis sicca of both eyes not specified as Sjogren's 7/29/2016    Leiomyoma of uterus, unspecified 9/16/2014    Long QT interval 6/29/2016    Due to medication (plaquenil)     Macular edema 1/12/2015    Multinodular goiter 1/12/2016    Neuropathy 8/23/2017    Plaquenil causing adverse effect in therapeutic use 10/7/2016    Pneumococcal vaccine refused 8/17/2016    Pneumonia due to Streptococcus pneumoniae 4/5/2018    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/2015    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 On the time of my examination yesterday patient was awake and alert, able to answer y/o and fixated on food and able to follow some simple central commands but requiring stimulation. EEG overnight showed multiple seizures where patient was asleep with head tilted to right and then wakes up and stares. Patient received ativan 2mg IV and LEV 1 g IV and her LEV increased to 1500mg BID and ZNS to 200mg HS. She had another starring episode per nurse and she received an additional 2mg ativan IV. This morning patient somnolent, unable to participate with PT. LFTs improving but continues to have leukocytosis.    Reviewed this morning' EEG with epilepsy and she was still having frequent runs of epileptic changes and Vimpat 150mg IV ordered and primary team and pharmacy were notified. Specifically concerned for patient's mentation and airway protection. Called then by [facility] that patient still having seizures, LCM had not been given and she was s/u to [facility] for close monitoring and management.    Procedure: COLONOSCOPY;  Surgeon: FIDELINA Sanchez MD;  Location: [facility] ENDO (4TH FLR);  Service: Endoscopy;  Laterality: N/A;    EYE SURGERY      INJECTION OF ANESTHETIC AGENT AROUND NERVE Left 4/19/2021    Procedure: BLOCK, NERVE, FEMORAL AND OBTURATOR;  Surgeon: Shivam Gonzalez MD;  Location: [facility] PAIN MGT;  Service: Pain Management;  Laterality: Left;  consent needed    JOINT REPLACEMENT      right knee    KNEE SURGERY Left 12/31/2013    TKR    left parotidectomy      mixed tumor    GA EVAL,SWALLOW FUNCTION,CINE/VIDEO RECORD  6/5/2021         SALIVARY GLAND SURGERY      TONSILLECTOMY      UPPER GASTROINTESTINAL ENDOSCOPY       Family History   Problem Relation Age of Onset    Hypertension Mother     Heart disease Mother     Hyperthyroidism Mother     Prostate cancer Father         prostate cancer    Cancer Father     Breast cancer Maternal Grandmother     Hyperthyroidism Other     Colon cancer Neg Hx      Social History     Tobacco Use    Smoking status: Never Smoker    Smokeless tobacco: Never Used   Substance Use Topics    Alcohol use: Yes     Alcohol/week: 0.0 standard drinks     Comment: very seldom     Drug use: No     Review of patient's allergies indicates:  No Known Allergies    Medications: I have reviewed the current medication administration record.    (Not in a hospital admission)      Review of Systems   Constitutional:  Positive for activity change and fatigue. Negative for fever.   HENT:  Negative for facial swelling and hearing loss.    Eyes:  Negative for pain, discharge and visual disturbance.   Respiratory:  Negative for cough and shortness of breath.    Cardiovascular:  Negative for chest pain and palpitations.   Gastrointestinal:  Negative for abdominal pain and nausea.   Musculoskeletal:  Positive for arthralgias. Negative for neck pain.   Skin:  Negative for color change and rash.   Neurological:  Positive for weakness. Negative for facial asymmetry, speech difficulty and numbness.    Psychiatric/Behavioral:  Negative for agitation and confusion.    Objective:     Vital Signs (Most Recent):  Temp: 98 °F (36.7 °C) (06/19/22 1946)  Pulse: 91 (06/19/22 1946)  Resp: 16 (06/19/22 1946)  BP: (!) 109/58 (06/19/22 1946)  SpO2: 98 % (06/19/22 1946)    Vital Signs Range (Last 24H):  Temp:  [98 °F (36.7 °C)]   Pulse:  [91]   Resp:  [16]   BP: (109)/(58)   SpO2:  [98 %]     Physical Exam  Vitals reviewed.   Constitutional:       General: She is not in acute distress.     Appearance: Normal appearance. She is normal weight. She is not ill-appearing.   HENT:      Head: Normocephalic and atraumatic.      Mouth/Throat:      Mouth: Mucous membranes are moist.      Pharynx: Oropharynx is clear.   Eyes:      General:         Right eye: No discharge.         Left eye: No discharge.      Extraocular Movements: Extraocular movements intact.      Conjunctiva/sclera: Conjunctivae normal.      Pupils: Pupils are equal, round, and reactive to light.   Cardiovascular:      Rate and Rhythm: Normal rate.   Neurological:      Mental Status: She is alert.       Neurological Exam:   LOC: alert  Attention Span: Good   Language: No aphasia  Articulation: No dysarthria  Orientation: Person, Place, Time   Visual Fields: Full  EOM (CN III, IV, VI): Full/intact  Pupils (CN II, III): PERRL  Facial Sensation (CN V): Normal  Facial Movement (CN VII): Symmetric facial expression    Motor: Arm left  Paresis: 3-/5  Leg left  Paresis: 3-/5  Arm right  Normal 5/5  Leg right Normal 5/5  Cerebellum: No evidence of appendicular or axial ataxia, cannot perform on left due to weakness  Sensation: Intact to light touch and temperature      Laboratory:  CMP: No results for input(s): GLUCOSE, CALCIUM, ALBUMIN, PROT, NA, K, CO2, CL, BUN, CREATININE, ALKPHOS, ALT, AST, BILITOT in the last 24 hours.  CBC:   Recent Labs   Lab 06/19/22 1958   HCT 36     Lipid Panel: No results for input(s): CHOL, LDLCALC, HDL, TRIG in the last 168 hours.  Coagulation:  No results for input(s): PT, INR, APTT in the last 168 hours.  Hgb A1C: No results for input(s): HGBA1C in the last 168 hours.  TSH: No results for input(s): TSH in the last 168 hours.    Diagnostic Results:    Brain imaging:  MRI Brain acute ischemic protocol    Vessel Imaging:  CTA official read pending    Cardiac Evaluation:   EKG 06/19/2022  NSR    Previous ECHO  Transthoracic echo (TTE) complete 01/27/2022    Interpretation Summary  · The left ventricle is normal in size with concentric remodeling and normal systolic function. The estimated ejection fraction is 55%.  · Normal right ventricular size with normal right ventricular systolic function.  · Normal left ventricular diastolic function.  · Mild mitral regurgitation.  · No evidence of intracardiac vegetation

## 2023-06-09 NOTE — PROGRESS NOTES
Protocol: The [employer] Effectiveness Study  Identifier: [id number]  IRB#: [id number]  PI: Dr. Adrien Lagos, PhD  CO-I: Dr. Laura Waller PsyD  Version Date: 12/05/2022  Pt Study ID: [id number]  Visit Month: M3  Care Plan documented on: (4/10/23) - Please refer to note for more information.     Timeline:   (*Note for CTN - complete timeline using completed or planned date for study events. Add any important events (i.e. Withdrawals, Lost to Follow Up, Adverse Events, etc.).    Consent: Completed(4/5/23)  Baseline questionnaires: Completed(4/5/23)  6-month questionnaires: Planned(9/2023)  12-month questionnaires: Planned(3/2024)      Visit Note:   Spoke with caregiver Rosy Rosado (Daughter) for month 3 visit.   Pt is currently in SNF--doing well and likes the [facility] she is in[facility].  Family is keeping a good balance and practicing self-care.  Pt possibly in facility another monthy but definite plans to come home.      Falls in the past month: 0  UTIs in the past month: 0  Hospital encounters in the past month (reported by caregiver): 0      Next monthly visit scheduled for: 7/10/23. Caregiver knows how to reach CTN, and is encouraged to do so, as needed before our next scheduled call.     Action items for CTN: Continue to follow        ClinCard sent/loaded: no  (*Only applies to 6-month and 12-month visits)

## 2023-06-30 NOTE — TELEPHONE ENCOUNTER
Spoke to Frances, she is asking if you would be ok with taking over the home health orders that were written by [facility]. They ordered for PT and for OT. Ok to call back with verbal if you are ok with signing future orders. Let me know if this is ok to authorize

## 2023-06-30 NOTE — TELEPHONE ENCOUNTER
----- Message from Brandt Cook sent at 6/30/2023  3:11 PM CDT -----  Contact: [phone] @ [facility]  Good afternoon [facility] would a call back about getting home health work orders signed by the doc. Please give them a call back [phone]

## 2023-06-30 NOTE — TELEPHONE ENCOUNTER
----- Message from Jade Uribe sent at 6/30/2023 10:18 AM CDT -----  Contact: [phone] Kinsey Euceda with [facility] is requesting a call back to get the pt scheduled in.No available appts generated. Thanks

## 2023-07-10 NOTE — TELEPHONE ENCOUNTER
SW made attempt to complete [employer] monthly check in phone call, LVM for CG.  SW will make second attempt 7/13/23.

## 2023-07-13 NOTE — PROGRESS NOTES
Protocol: [employer] Effectiveness Study  Identifier: [id number]  IRB#: 2022.247  PI: Dr. Adrien Lagos, PhD  CO-I: Dr. Laura Waller PsyD  Version Date: 12/05/2022  Pt Study ID: [id number]  Visit Month: M4  Care Plan documented on: (4/10/23) - Please refer to note for more information.      Timeline:   (*Note for CTN - complete timeline using completed or planned date for study events. Add any important events (i.e. Withdrawals, Lost to Follow Up, Adverse Events, etc.).     Consent: Completed(4/5/23)  Baseline questionnaires: Completed(4/5/23)  6-month questionnaires: Planned(9/2023)  12-month questionnaires: Planned(3/2024)        Visit Note:   Spoke with caregiver Dr. Ramirezrudy (daughter).  She said pt is newly home from [facility] where she had an excellent experience.  She said pt needs some equipment like an orthotic for her left foot.  CG will speak with with PT to get exact type and message Dr. VILLALOBOS for the orders.  HH has completed pt's eval and will begin services.  CG concerned about keeping up pt's activity level.  SW will send 50 activities handout.  Lastly, CG expressed concern that she feels pt would benefit from a low-dose anti-depressant.  SW recommended messaging Dr. VILLALOBOS about this as well.  CG agreed.      Falls in the past month: 0  UTIs in the past month: 0  Hospital encounters in the past month (reported by caregiver): 0        Next monthly visit scheduled for: 8/14/23. Caregiver knows how to reach CTN, and is encouraged to do so, as needed before our next scheduled call.      Action items for CTN: Send 50 activities handout.           ClinCard sent/loaded: no  (*Only applies to 6-month and 12-month visits)

## 2023-07-17 PROBLEM — R60.0 LOWER EXTREMITY EDEMA: Status: RESOLVED | Noted: 2022-12-26 | Resolved: 2023-01-01

## 2023-07-17 NOTE — TELEPHONE ENCOUNTER
Orders placed per NP request for labs to be drawn (CMP, CBC, Mag, Phos.)  Orders faxed to [facility]'s Phlebotomy.  Fax confirmation received.

## 2023-07-17 NOTE — PROGRESS NOTES
"[facility]  Medical Home Visit    Visit Date: 7/17/2023  Encounter Provider: Eric Chandra NP  PCP:  Génesis Rosario MD    Subjective:      Patient ID: Selma Lux is a 87 y.o. female.    Chief Complaint: Follow-up for chronic medical conditions and medication review    Reason for home services: The patient is being seen at home due to a physical debility or recent acute hospitalization that presents a taxing effort to leave the home, to mitigate high risk of hospital readmission or due to the limited availability of reliable or safe options for transportation to the point of access to the provider. The visit meets the criteria for medical necessity as defined by CMS as "health-care services needed to prevent, diagnose, or treat an illness, injury, condition, disease, or its symptoms and that meet accepted standards of medicine." Prior to treatment on this visit the chart was reviewed and patient consent was obtained.    HPI: 87 y.o. female with history of subdural hematoma, drug-induced adrenal insufficiency, thyroid abnormalities, hypertension, previous stroke with residual hemiplegia, COPD, Hashimoto's, cryptogenic organizing pneumonia, RA (on plaquenil and cellcept), hip OA, paroxysmal Afib, vascular dementia, iron deficiency anemia, HTN, HLD.  She recently presented to ED with left sided weakness, general malaise, poor appetite, and nausea/vomiting. She was found to be hypotensive, hypoglycemia, KERMIT, Low Na.  She improved with fluids. Baclofen and neurontin was decreased to only nightly. She was discharged to [facility] and now home with home health.       Today:  Ms. Selma Lux is a 87 y.o. female. Selma presents at baseline state of health as reported by caregiver and daughter.  She is found sitting at kitchen table taking morning meds, no acute issues or concerns.  VSS.  She denies chest pain, SOB, fevers, cough, congestion, change in bladder habits, change in bowl " habits.  Reports taking meds as prescribed.  Reports good BM pattern, sleep, adequate po intake. She requires assistance with ADLs and have great caregiver support in home. No other needs identified at this time.  Patient's daughter is aware of all upcoming appts.     Review of Systems   Constitutional:  Negative for chills and fever.   HENT:  Negative for congestion, postnasal drip and rhinorrhea.    Eyes:  Negative for visual disturbance.   Respiratory:  Negative for chest tightness and shortness of breath.    Cardiovascular:  Negative for chest pain, palpitations and leg swelling.   Gastrointestinal:  Negative for abdominal pain, constipation, diarrhea, nausea and vomiting.   Genitourinary:  Negative for difficulty urinating.   Musculoskeletal:  Positive for gait problem. Negative for arthralgias and myalgias.   Skin:  Negative for color change.   Neurological:  Positive for weakness. Negative for dizziness, light-headedness and headaches.   Hematological:  Does not bruise/bleed easily.   Psychiatric/Behavioral:  Negative for agitation.      Assessments:  Environmental: adequate lighting and temperature control  Functional Status: assistance with ADL's/IADL's, wheelchair bound, incontinent of bladder  Safety: Fall Precautions, COVID Precautions/Social Distancing/Mask Use  Nutritional: Adequate  Home Health: [facility]  DME/Supplies: wc, walker        Ethical / Legal: Advance Care Planning   Capacity to make medical decisions:  no, Conflict no  Surrogate decision maker:  Name Susu, Relationship: Daughter  Advance Directives:  yes  Living Will: on file  LaPOST:  none  HPOA: none  Code Status:  full code  Will discuss ACP in detail during next visit.         Objective:     Vitals:    07/17/23 1100   BP: 136/80   Pulse: 71   Resp: 20   Temp: 98.2 °F (36.8 °C)   SpO2: 98%     There is no height or weight on file to calculate BMI.    Physical Exam  HENT:      Head: Normocephalic and atraumatic.      Nose: No  congestion or rhinorrhea.      Mouth/Throat:      Mouth: Mucous membranes are moist.   Cardiovascular:      Rate and Rhythm: Normal rate.   Pulmonary:      Effort: No respiratory distress.      Breath sounds: Normal breath sounds.   Abdominal:      General: Bowel sounds are normal.      Palpations: Abdomen is soft.   Musculoskeletal:      Cervical back: Normal range of motion and neck supple.      Right lower leg: No edema.      Left lower leg: No edema.   Skin:     General: Skin is warm and dry.   Neurological:      Mental Status: She is alert. Mental status is at baseline.   Psychiatric:         Mood and Affect: Mood normal.       Plan:          1. Hemichorea  Overview:  -see Neurology notes 8/23/2017, 8/30/2017 - ? Related to R caudate infarct. Sx began as a L sided motion disorder.  -followed by neurology previously    Assessment & Plan:  History of stroke  Currently wheelchair bound  Continue home pt/ot      2. Vascular dementia, unspecified dementia severity, unspecified whether behavioral, psychotic, or mood disturbance or anxiety  Assessment & Plan:  mentation at baseline per daughter and caregiver   Continue caregiver support       3. Acute encephalopathy  Assessment & Plan:  Resolved       4. 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 Pulmonary hypertension due to interstitial lung disease  Overview:  --Past echocardiograms during pulmonary exacerbations have shown mildly elevated PA pressures (37 mmHg maximum). Echo  August 2019 showed decreased LVEF at 40% with some evidence of left-sided heart disease.  12/16/2022  EF 65%, DD.    -The clinical picture currently is more suggestive of WHO 2 pulmonary hypertension that is pretty mild.  There may be an element of WHO 3 pulmonary hypertension at times of more deranged gas exchange.    -followed in cardiology and Pulmonary Clinics    Assessment & Plan:  Currently stable, at baseline  Does not require oxygen  Continue to follow with cardiology and pulmonology       6. Chronic obstructive pulmonary disease, unspecified COPD type  Overview:  - followed by Pulmonary Medicine    Assessment & Plan:  On chronic steroids for COOP and RA  Continue   Not using inhalers      7. Hypertension, essential  Assessment & Plan:  Normotensive  Continue home coreg and amlodipine       8. AF (paroxysmal atrial fibrillation)  Overview:  -followed by Cardiology, on Eliquis  -PAF noted on admission circa 4/2/2018 for respiratory failure (PNA +/- )    Assessment & Plan:  Rate controlled  Continue eliquis and BB       9. Mixed hyperlipidemia  Assessment & Plan:  Denies chest pain  Continue statin       10. Heart failure with preserved ejection fraction, unspecified HF chronicity  Assessment & Plan:  Denies chest pain, SOB  Appears euvolemic on exam  Continue medication as prescribed  Keep scheduled follow up appts  Activity and Diet restrictions:   Recommend 2-3 gram sodium restriction and 1500cc- 2000cc fluid restriction.  Encourage physical activity with graded exercise program.  Requested patient to weigh themselves daily, and to notify us if their weight increases by more than 3 lbs in 1 day or 5 lbs in 3 days.  Elevated lower extremities while sitting/lying, compression stockings         11. KERMIT (acute kidney injury)  Assessment & Plan:  No acute issues   Voiding well  Ordered cmp, will follow       12. Hyponatremia  Assessment & Plan:  Ordered cmp, will follow       13. Rheumatoid arthritis of multiple sites  Overview:  -Dx 2012 with Dr No, rafal Qureshi  HCQ since 2012  Prednisone 5 mg/d since  2012 previously (doses changed at times due to other conditions, pulmonary)  Humira one dose April 2018 followed by ICU admission for pnemonia and resp failure    -Managed by rheumatology      Assessment & Plan:  No acute issues   Continue hydroxychloroquine, prednisone and cellcept  Continue to follow with rheum      14. Iron deficiency anemia secondary to inadequate dietary iron intake  Assessment & Plan:  Continue iron supplement       15. Rheumatic torticollis  Overview:  Neck pain, headaches related to contracted and spastic cervical spine musculature    Assessment & Plan:  Neck pain, headaches related to contracted and spastic cervical spine musculature  PT/OT in the home  Continue tylenol  Increase hydration      16. Age-related physical debility  Assessment & Plan:  Recently discharged from SNF  Continue home PT/OT      17. Vascular Dementia  Overview:  -neurology assessment 7/30/2018, 8/18/2020  -9/8/2017 Neuropsychiatry note Dr. Lagos    Assessment & Plan:  Noted by neuropsych Dr. Crabtree  Chronic changes and remote infarcts on MRI brain and CTH  Continue caregiver support       Other orders  -     predniSONE (DELTASONE) 10 MG tablet; Take 1 tablet (10 mg total) by mouth once daily.  Dispense: 90 tablet; Refill: 2           Instructions:  - Continue all medications, treatments and therapies as ordered.   - Follow all instructions, recommendations as discussed.  - Maintain Safety Precautions at all times.  - Attend all medical appointments as scheduled.  - For worsening symptoms: call Primary Care Physician or Nurse Practitioner.  - For emergencies, call 911 or immediately report to the nearest emergency room.  - Limit Risks of environmental exposure to coronavirus/COVID-19 as discussed including: social distancing, hand hygiene, and limiting departures from the home for necessities only.      Follow Up Appointments:   Future Appointments   Date Time Provider Department Center   7/24/2023 11:00 AM Génesis Phillips  MD [facility] MED [facility] PCW   7/25/2023 11:00 AM Lonnie Rouse MD [facility] RHEUM [facility]           Signature:       Eric Chandra, MSN, APRN, FNP-C  [facility]    Total face-to-face time was 60 min, >50% of this was spent on counseling and coordination of care. The following issues were discussed: primary and secondary diagnoses, co-morbidities, prescribed medications, treatment modalities, importance of compliance with medical advice and directives for follow-up care

## 2023-07-18 NOTE — ASSESSMENT & PLAN NOTE
--with left hemiplegia  --unable to walk at this time; seeing PT/OT with home health  --able to pivot to wheelchair  Continue caregiver support

## 2023-07-18 NOTE — ASSESSMENT & PLAN NOTE
Neck pain, headaches related to contracted and spastic cervical spine musculature   PT/OT in the home   Continue tylenol   Increase hydration

## 2023-07-18 NOTE — ASSESSMENT & PLAN NOTE
Currently stable, at baseline  Does not require oxygen  Continue to follow with cardiology and pulmonology

## 2023-07-18 NOTE — ASSESSMENT & PLAN NOTE
1. Denies chest pain, SOB  2. Appears euvolemic on exam  3. Continue medication as prescribed  4. Keep scheduled follow up appts  5. Activity and Diet restrictions:   ? Recommend 2-3 gram sodium restriction and 1500cc- 2000cc fluid restriction.  ? Encourage physical activity with graded exercise program.  ? Requested patient to weigh themselves daily, and to notify us if their weight increases by more than 3 lbs in 1 day or 5 lbs in 3 days.  ? Elevated lower extremities while sitting/lying, compression stockings

## 2023-07-18 NOTE — ASSESSMENT & PLAN NOTE
Noted by neuropsych Dr. Crabtree  Chronic changes and remote infarcts on MRI brain and CTH  Continue caregiver support

## 2023-07-18 NOTE — ASSESSMENT & PLAN NOTE
No acute issues   Continue hydroxychloroquine, prednisone and cellcept  Continue to follow with rheum

## 2023-07-21 NOTE — TELEPHONE ENCOUNTER
07/31/23      Jermaine D Pumphrey  [address] [address]    Dear Leo,    We look forward to seeing you on 11/9/23 at 7:30 with a 6:30 arrival for your procedure.    To better prepare you, please observe the following:     Preparing for GI Procedure    There is no need to schedule an appointment with your primary care physician      You will receive a call from our Pre-Admission Testing department prior to your surgical procedure. This call is necessary to get important information about your health history, to ensure you are properly prepared for surgery, and minimize the chance of having your procedure delayed or rescheduled.    WHAT TO DO ABOUT YOUR PRESCRIPTION MEDICATIONS  You must continue taking all your previously prescribed medications as directed unless specifically told not to by your doctor, or if you find them on the list below    STOP THESE MEDICATIONS   Aspirin stop 3 days prior unless instructed otherwise by your doctor or Cardiologist.   Erectile dysfunction medications stop 2 days prior    Herbal medications/ Vitamins/ Fish Oil stop 7 days prior unless otherwise directed by your doctor.    Phentermine stop 7 days prior   Selegiline patches stop 21 days prior   Your doctor will have given you instructions about modifying your Insulin regimen 1 day prior.  If on a blood thinner, ask your doctor, Cardiologist and/or surgeon if and when it should be stopped.    You will be contacted by phone or email by a company called [employer]. This is an online educational platform that will provide education regarding your procedure. Please take the time to review this video.    Day before Procedure  Please follow any specific instructions given to you regarding any prep needed related to your procedure.    Day of Procedure  Take your normal morning medications with a sip of water first thing in the morning prior to surgery, except those medications your doctor or our Pre-Admission Testing  Tramadol 50mg po Q8h number 90  sent to pharmacy to be used as needed.   reviewed, last script 6/2019   office has specifically told you not to take.    Other than a sip of water to wash down medications, DO NOT PUT ANYTHING IN YOUR MOUTH FOR AT LEAST FOUR HOURS PRIOR TO YOUR PROCEDURE.  This includes gum, candy, cigarettes, and chewing tobacco.       If you choose, for your convenience we have an outpatient pharmacy on-site and can arrange to have your discharge prescription filled before you leave, to save you a trip. You may want to bring enough money to cover the cost of this prescription.      Each patient that comes through the GI department is given individualized care and attention. The doctors and nurses spend the time necessary with each patient to ensure the best care. Sometimes, this can cause delays. You may want to bring a book, puzzle or music player to keep you busy if you experience a delay.       Procedures usually involve giving medications that put you to sleep or ease anxiety. For this reason, you must have a ride home from the hospital and someone (over age 16) should stay with you for the first 24 hours after your procedure. The procedure will be canceled if you do not have a responsible person with you. You are not allowed to drive after receiving sedation of any type.     Visitor Restrictions are currently in place due to COVID-19.  You can bring one designated adult (18 yr or older) with you to your surgery. Additional COVID related restrictions may apply.     Bring a photo ID, your insurance card and any copay due at time of service. Leave all valuables at home. Do not wear jewelry, makeup, body lotion, or fingernail polish.        If you have any questions or concerns before the day of your procedure, please call [phone] to have them answered.     After your procedure you may get a survey via email or text message. Please take the opportunity to fill it out letting us know what we did well or what we could improve on. Our goal is to provide our patients with the best possible care and  we couldn’t do it without your input.     We look forward to seeing you,  Your GI Services Team

## 2023-07-23 NOTE — PT/OT/SLP PROGRESS
Occupational Therapy      Patient Name:  Selma Lux   MRN:  [id number]    Patient not seen today secondary to Other (Comment) (Per discussion with PT, pt ANA this morning for CT scan. This afternoon upon second attempt, pt receiving EEG services.). Will follow-up for OT evaluation as appropriate.    Catalina Snyder OT    12/14/2021   home

## 2023-07-24 PROBLEM — I95.9 HYPOTENSION: Status: ACTIVE | Noted: 2023-01-01

## 2023-07-24 NOTE — PROGRESS NOTES
"Primary Care Provider Appointment - [facility]  SHARED NOTE: Brianda Chisholm (MS4), Dr Rosario (Attending)    Subjective:      Patient ID: Selma Lux is a 87 y.o. female with history of subdural hematoma, tertiary adrenal insufficiency, hypertension, COPD, cryptogenic organizing pneumonia, RA (on Plaquenil and CellCept), paroxysmal Afib, vascular dementia.      Chief Complaint: Follow-up    Here with both daughters.     Prior to this visit, patient's last encounter with PCP was 5/12/2023. At that time she was transferred to the ED for further management due to weakness, vomiting, low BP, and confusion. At the ED she had head CT, urine studies, and labs done. She was noted to have an KERMIT with creatinine 1.6 from baseline 1, mild hyponatremia.  Infectious workup was unremarkable. Improved after night inpatient and was discharged two days later to skilled nursing facility. Was in the SNF for about 6 weeks and has been home for 3 weeks. Daughters report the heat has been a problem since coming home. Was doing well the first week she was home but lately declining again. PT coming twice per week, OT coming once per week, and home health (NP) comes as needed. Labs done last week and had low sodium, elevated inflammatory markers. BP low. Patient has been more confused, dazed, and fatigued. Daughters note she has been seeing things that are not there, especially at night. For example, thought she saw a parade route on the way to the appointment.     Patient has ongoing footdrop. Is unable to keep her foot on the footrest in the wheelchair during exam. Her foot repeatedly slips out of the footrest. Per previous PCP note "She had a challenge gait with possible foot drop.  She had what appeared to be a hyperreflexia to the left leg."    Cardiologist has her on a limited fluid intake (around 1 L) to avoid CHF exacerbation. Last appointment was prior to hospitalization. Daughters report difficulty managing liquids " with how hot it is outside.     Taking bactrim for pneumonia prevention.     Component       Sodium   Latest Ref Rng & Units       136 - 145 mmol/L   7/19/2023       129 (L)   5/14/2023       133 (L)   5/13/2023       135 (L)   5/12/2023       6:38  (L)   5/12/2023      12:49  (L)   2/22/2023       136   2/10/2023       131 (L)   2/2/2023       128 (L)   1/11/2023       128 (L)       Hypertension  Currently taking amlodipine 5 mg po qd. Having low BP lately.   - BP in clinic today is 105/51    COPD  Cryptogenic organizing pneumonia  Currently taking albuterol and montelukast. Stable. Follows with pulmonology (Dr. Francis).   - long term steroids for     RA  Currently on Plaquenil and CellCept. Follows with rheumatology (Dr. Rouse). Worsening pains in joints.     Vascular dementia  Progression was stable. Had some acute delirium when in the hospital. Daughters reporting worsening memory and hallucinations lately, unsure whether it is due to vascular dementia or low sodium.     Care Gaps:  - shingles - deferred   - pneumococcal - CANNOT get due to immunosuppression (on bactrim for prophylaxis)   - DEXA today (last was 2019)     Medications: Does not have pill packs  Amlodipine 5 mg po qd - CEASE today   atorvastatin 40 mg po qd  baclofen 10 mg po qhs prn muscle spasms (taking 5 mg at lunch and 5 mg at dinner, 10 mg at bedtime)  gabapentin 300 mg po qhs prn neuropathy (100 mg at dinner, 200-300 mg at bedtime)  apixaban 2.5 mg po bid  carvedilol 3.125 po bid  Iron, magnesium, thiamine 100 mg po qd  montelukast 10 mg po qd, albuterol   pantoprazole 40 mg po qd  trazodone 50 mg po qhs prn insomnia  Prednisone 10 mg po qd, mycophenolate 500 mg po bid, hydroxychloroquine 200 mg po bid        4Ms for Medical Decision-Making in Older Adults    Last Completed EAWV: 1/25/2021    MOBILITY:  Get Up and Go:  No flowsheet data found.  Activities of Daily Living:  Activities of Daily Living 1/25/2021   Ambulation  Assistance Required   Ambulation Assistance Walker (wheeled)   Dressing Assistance Required   Dressing Assistance Minimum   Transfers Independent   Transfers Assistance -   Toileting Continent of bladder;Continent of bowel   Toileting Assistance -   Feeding Independent   Cleaning home/Chores Assistance Required   Telephone use Independent   Shopping Assistance Required   Paying bills Assistance Required   Taking meds Assistance Required     Whisper Test:  Whisper Test 1/25/2021   Whisper Test Normal     Disability Status:  Disability Status 1/25/2021   Are you deaf or do you have serious difficulty hearing? No   Are you blind or do you have serious difficulty seeing, even when wearing glasses? No   Because of a physical, mental, or emotional condition, do you have serious difficulty concentrating, remembering, or making decisions? Yes   Do you have serious difficulty walking or climbing stairs? Yes   Do you have difficulty dressing or bathing? Yes   Because of a physical, mental, or emotional condition, do you have difficulty doing errands alone such as visiting a doctor's office or shopping? Yes     Nutrition Screening:  Nutrition Screening 1/25/2021   Has food intake declined over the past three months due to loss of appetite, digestive problems, chewing or swallowing difficulties? No decrease in food intake   Involuntary weight loss during the last 3 months? No weight loss   Mobility? Goes out   Has the patient suffered psychological stress or acute disease in the past three months? No   Neuropsychological problems? No psychological problems   Body Mass Index (BMI)?  BMI 23 or greater   Screening Score 14    Screening Score: 0-7 Malnourished, 8-11 At Risk, 12-14 Normal    MENTATION:   Depression Patient Health Questionnaire:  Depression Patient Health Questionnaire 4/6/2023   Over the last two weeks how often have you been bothered by little interest or pleasure in doing things Several days   Over the last two  weeks how often have you been bothered by feeling down, depressed or hopeless More than half the days   PHQ-2 Total Score 3   PHQ-9 Total Score 9   PHQ-9 Interpretation Mild     Has Dementia Dx: Yes    Cognitive Function Screening:  Cognitive Function Screening 1/25/2021   Clock Drawing Test 2   Mini-Cog 3 Minute Recall 2   Cognitive Function Screening 4     Cognitive Function Screening Total - Less than 4 = Abnormal,  Greater than or equal to 4 = Normal    MEDICATIONS:  High Risk Medications:  Total Active Medications: 3  baclofen - 10 MG  gabapentin - 100 MG, 300 MG    WHAT MATTERS MOST:  Advance Care Planning   ACP Status:   Patient has had an ACP conversation  Living Will: Yes  Power of : No  LaPOST: No    What is most important right now is to focus on spending time at homeavoiding the hospitalsymptom/pain control    Accordingly, we have decided that the best plan to meet the patient's goals includes continuing with treatment      What matters most to patient today is: remaining engaged with her surroundings                 Social History     Socioeconomic History    Marital status:    Tobacco Use    Smoking status: Never     Passive exposure: Never    Smokeless tobacco: Never   Substance and Sexual Activity    Alcohol use: Yes     Alcohol/week: 0.0 standard drinks     Comment: very seldom     Drug use: No    Sexual activity: Not Currently     Comment: ,  age 52,    Other Topics Concern    Are you pregnant or think you may be? No    Breast-feeding No   Social History Narrative    ** Merged History Encounter **          Social Determinants of Health     Financial Resource Strain: Low Risk     Difficulty of Paying Living Expenses: Not hard at all   Food Insecurity: No Food Insecurity    Worried About Running Out of Food in the Last Year: Never true    Ran Out of Food in the Last Year: Never true   Transportation Needs: No Transportation Needs    Lack of Transportation (Medical): No     "Lack of Transportation (Non-Medical): No   Physical Activity: Insufficiently Active    Days of Exercise per Week: 2 days    Minutes of Exercise per Session: 30 min   Stress: Stress Concern Present    Feeling of Stress : To some extent   Social Connections: Unknown    Frequency of Communication with Friends and Family: More than three times a week    Frequency of Social Gatherings with Friends and Family: More than three times a week    Active Member of Clubs or Organizations: Yes    Attends Club or Organization Meetings: 1 to 4 times per year    Marital Status:    Housing Stability: Low Risk     Unable to Pay for Housing in the Last Year: No    Number of Places Lived in the Last Year: 1    Unstable Housing in the Last Year: No       Review of Systems   Constitutional:  Positive for fatigue. Negative for chills and fever.   HENT:  Negative for congestion, postnasal drip, rhinorrhea and sore throat.    Respiratory:  Negative for cough and shortness of breath.    Cardiovascular:  Negative for chest pain, palpitations and leg swelling.   Gastrointestinal:  Negative for abdominal pain, constipation, diarrhea, nausea and vomiting.   Genitourinary:  Negative for dysuria, flank pain, frequency, hematuria and urgency.   Musculoskeletal:  Negative for arthralgias, back pain, myalgias and neck pain.   Skin:  Negative for rash.   Neurological:  Negative for dizziness, syncope, weakness, light-headedness and headaches.   Psychiatric/Behavioral:  Positive for confusion and hallucinations. Negative for dysphoric mood. The patient is not nervous/anxious.      Objective:   BP (!) 105/51 (BP Location: Right arm, Patient Position: Sitting, BP Method: Small (Automatic))   Pulse 84   Temp 98.3 °F (36.8 °C) (Oral)   Ht 5' 4" (1.626 m)   LMP  (LMP Unknown)   SpO2 99%   BMI 27.44 kg/m²     Physical Exam  Vitals and nursing note reviewed.   Constitutional:       General: She is not in acute distress.     Appearance: Normal " appearance. She is not ill-appearing.      Comments: Ambulates by wheelchair  Can not maintain upright positioning throughout exam   HENT:      Head: Normocephalic and atraumatic.      Mouth/Throat:      Mouth: Mucous membranes are moist.      Pharynx: Oropharynx is clear.   Eyes:      Extraocular Movements: Extraocular movements intact.      Conjunctiva/sclera: Conjunctivae normal.   Neck:      Comments: Decreased ROM of neck  Cardiovascular:      Rate and Rhythm: Normal rate and regular rhythm.      Pulses: Normal pulses.      Heart sounds: Normal heart sounds.   Pulmonary:      Effort: Pulmonary effort is normal.      Breath sounds: Normal breath sounds.   Abdominal:      General: Abdomen is flat. Bowel sounds are normal. There is no distension.      Palpations: Abdomen is soft.      Tenderness: There is no abdominal tenderness.   Musculoskeletal:         General: Swelling and deformity present. Normal range of motion.      Cervical back: Rigidity present.      Right lower leg: Edema present.      Left lower leg: Edema present.      Comments: L foot drop, can not keep foot on wheelchair rest throughout exam   Skin:     General: Skin is warm and dry.      Capillary Refill: Capillary refill takes less than 2 seconds.   Neurological:      General: No focal deficit present.      Mental Status: She is alert.      Comments: Slightly confused, sleepy  dyskinesia   Psychiatric:         Mood and Affect: Mood normal.         Behavior: Behavior normal.           Lab Results   Component Value Date    WBC 5.71 07/19/2023    HGB 10.2 (L) 07/19/2023    HCT 32.6 (L) 07/19/2023    PLT 95 (L) 07/19/2023    CHOL 120 06/19/2022    TRIG 50 06/19/2022    HDL 65 06/19/2022    ALT 5 (L) 07/19/2023    AST 10 07/19/2023     (L) 07/19/2023    K 4.3 07/19/2023    CL 98 07/19/2023    CREATININE 1.1 07/19/2023    BUN 13 07/19/2023    CO2 24 07/19/2023    TSH 1.084 05/12/2023    INR 1.1 06/20/2022    HGBA1C 5.3 06/20/2022       Current  Outpatient Medications on File Prior to Visit   Medication Sig Dispense Refill    acetaminophen (TYLENOL) 500 MG tablet Take 2 tablets (1,000 mg total) by mouth 2 (two) times daily as needed (Headache).      albuterol-ipratropium (DUO-NEB) 2.5 mg-0.5 mg/3 mL nebulizer solution Take 3 mLs by nebulization 2 (two) times daily as needed for Shortness of Breath. Rescue      apixaban (ELIQUIS) 2.5 mg Tab Take 1 tablet (2.5 mg total) by mouth 2 (two) times daily. 180 tablet 3    atorvastatin (LIPITOR) 40 MG tablet Take 1 tablet (40 mg total) by mouth every evening. 90 tablet 3    baclofen (LIORESAL) 10 MG tablet Take 1 tablet (10 mg total) by mouth nightly as needed (muscle spasms). 90 tablet 2    carvediloL (COREG) 3.125 MG tablet Take 1 tablet (3.125 mg total) by mouth 2 (two) times daily with meals. 120 tablet 3    ferrous sulfate, dried (SLOW FE) 160 mg (50 mg iron) TbSR Take 1 tablet (160 mg total) by mouth every other day.      gabapentin (NEURONTIN) 100 MG capsule Take 1 capsule (100 mg total) by mouth nightly as needed (neuropathy). Take one 100mg capsule with one 300mg capsule at bedtime as needed for neuropathy 180 capsule 2    gabapentin (NEURONTIN) 300 MG capsule Take 1 capsule (300 mg total) by mouth nightly as needed (neuropathy). 90 capsule 2    gabapentin 5% baclofen 2% amitriptyline 2% topical cream Apply topically 3 (three) times daily. 240 g 2    hydrOXYchloroQUINE (PLAQUENIL) 200 mg tablet Take 1 tablet (200 mg total) by mouth 2 (two) times daily. 180 tablet 11    LIDOcaine (LIDODERM) 5 % Place 1 patch onto the skin once daily. Remove & Discard patch within 12 hours as needed for pain or as directed by MD    Place patch onto lower back as needed      magnesium oxide (MAG-OX) 400 mg (241.3 mg magnesium) tablet Take 400 mg by mouth once daily.      mycophenolate (CELLCEPT) 500 mg Tab Take 1 tablet (500 mg total) by mouth 2 (two) times daily. 180 tablet 3    pantoprazole (PROTONIX) 40 MG tablet Take 1  tablet (40 mg total) by mouth once daily. 30 tablet 5    predniSONE (DELTASONE) 10 MG tablet Take 1 tablet (10 mg total) by mouth once daily. 90 tablet 2    sulfamethoxazole-trimethoprim 800-160mg (BACTRIM DS) 800-160 mg Tab One tablet by mouth every Monday, Wednesday, Friday to prevent lung infection 36 tablet 3    thiamine 100 MG tablet Take 100 mg by mouth once daily.      traZODone (DESYREL) 50 MG tablet Take 1 tablet (50 mg total) by mouth nightly as needed for Insomnia.      [DISCONTINUED] amLODIPine (NORVASC) 5 MG tablet Take 1 tablet (5 mg total) by mouth once daily.      [DISCONTINUED] calcium carbonate (TUMS) 200 mg calcium (500 mg) chewable tablet Take 2 tablets (1,000 mg total) by mouth once daily. (Patient not taking: No sig reported) 60 tablet 11    [DISCONTINUED] famotidine (PEPCID) 20 MG tablet Take 1 tablet (20 mg total) by mouth 2 (two) times daily. (Patient not taking: Reported on 6/15/2022) 60 tablet 11    [DISCONTINUED] lacosamide (VIMPAT) 10 mg/mL Soln oral solution Take 10 mLs (100 mg total) by mouth every 12 (twelve) hours. (Patient not taking: No sig reported) 600 mL 11     No current facility-administered medications on file prior to visit.         Assessment:   87 y.o. 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 (Chronic)     CVA in 6/2022, rehab at [facility]  Continues to have residual effects of stroke  Continue PT/OT in the home  Given extra exercises today            Pulmonary    COPD (chronic obstructive pulmonary disease) (Chronic)     On chronic steroids for COOP and RA  Continue   Not using inhalers            Cardiac/Vascular    Heart failure with preserved ejection fraction     On fluid and salt restriction with cardiology  Advised daughters to  increase fluid based on weather, and patient's fatigue         Hypotension     Low BP, dehydration and weakness today  Stop amlodipine  Educate on moderate increase in PO water during extreme heat            Renal/    Hyponatremia     Low NA today, likely related to hypotonic hyponatremia  Advised to hydrate if she appears dehydrated related to heat            Immunology/Multi System    Immunocompromised state due to drug therapy     RA and COOP  Continue management per Rheum and Pulm  Advised vaccines  Patient refusing  Message sent to Pulm to seek clarification of PNAvax            Other    Age-related physical debility - Primary (Chronic)     Receiving in-home PT/OT   Advised to do exercises on porch in the morning         Vascular Dementia (Chronic)     Worsening mentation today  Likely related to fatigue  Advised to increase physical and mental activity in the AM          Other Visit Diagnoses       Osteopenia, unspecified location        Relevant Medications    cholecalciferol, vitamin D3, 125 mcg (5,000 unit) capsule    Long term systemic steroid user        Relevant Medications    cholecalciferol, vitamin D3, 125 mcg (5,000 unit) capsule    Foot-drop, unspecified laterality        Relevant Orders    ANKLE FOOT ORTHOSIS FOR HOME USE            Health Maintenance         Date Due Completion Date    Shingles Vaccine (1 of 2) Never done ---    Pneumococcal Vaccines (Age 65+) (2 - PPSV23 if available, else PCV20) 05/02/2018 3/7/2018    DEXA Scan 06/04/2023 6/4/2019    Influenza Vaccine (1) 09/01/2023 2/5/2020 (Declined)    Override on 2/5/2020: Declined    Lipid Panel 06/19/2027 6/19/2022    TETANUS VACCINE 03/07/2028 3/7/2018            Future Appointments   Date Time Provider Department Center   7/25/2023 11:00 AM Lonnie Rouse MD [facility] RHEUM [facility] [place]   8/21/2023  8:00 AM Megan Chandra NP [facility] C3HV [place]   10/24/2023 11:00 AM Génesis Rosario MD [facility] MED CLN [facility]   2/27/2024  3:00  PM Satya Mckeon, OD [facility] OPTOMTY [place]         Follow up in about 3 months (around 10/24/2023). Total clinical care time was 60 min, issues addressed include.      Génesis Rosario MD/MPH  [facility]  [phone] [employer]

## 2023-07-24 NOTE — ASSESSMENT & PLAN NOTE
RA and COOP  · Continue management per Rheum and Pulm  · Advised vaccines  · Patient refusing  · Message sent to Pulm to seek clarification of PNAvax

## 2023-07-24 NOTE — Clinical Note
Hi Suzanna Francis and Nasim, I am PCP for Dr Lux. Family is reporting that she was informed that she should NOT take the PNAvax. I want to clarify whether she should not take the vaccine, versus there is no benefit of her taking it. If she could benefit from taking it without it causing her harm, then I can continue advising her to have it done. Otherwise, if it could cause her harm then I will take it off her chart as a reminder.  Thanks, Génesis Rosario MD/MPH [facility] [phone] [employer]

## 2023-07-24 NOTE — ASSESSMENT & PLAN NOTE
CVA in 6/2022, rehab at [facility]  · Continues to have residual effects of stroke  · Continue PT/OT in the home  · Given extra exercises today

## 2023-07-24 NOTE — ASSESSMENT & PLAN NOTE
Low BP, dehydration and weakness today  · Stop amlodipine  · Educate on moderate increase in PO water during extreme heat

## 2023-07-24 NOTE — PATIENT INSTRUCTIONS
TODAY:  - drink more water when exposed to heat  - try to increase physical and mental activities during the day  - early morning activities on the porch  - exercise daily  - exercises printed:  [email or web address]  - AFO please review with the therapist, then I will order the one they recommend  - start Vit3 5000U daily  - sleep hygiene  - message sent to Dr Francis and Nasim about PNAvax  - stop amlodipine  - send any blood pressure to Dr VILLALOBOS through [facility]    NEXT:  - continue DEXA scan discussion

## 2023-07-24 NOTE — ASSESSMENT & PLAN NOTE
On fluid and salt restriction with cardiology  · Advised daughters to increase fluid based on weather, and patient's fatigue

## 2023-07-24 NOTE — ASSESSMENT & PLAN NOTE
Worsening mentation today  · Likely related to fatigue  · Advised to increase physical and mental activity in the AM

## 2023-07-24 NOTE — ASSESSMENT & PLAN NOTE
Low NA today, likely related to hypotonic hyponatremia  · Advised to hydrate if she appears dehydrated related to heat

## 2023-07-25 NOTE — TELEPHONE ENCOUNTER
Orders placed per NP request for CMP to be drawn.  Orders faxed to [facility].  Fax confirmation received.

## 2023-07-28 NOTE — TELEPHONE ENCOUNTER
Spoke to daughter Susu, informed that getting the pneumonia vaccine would be ok but Dr Lux does want to have the vaccine due to reaction in the past. Advised she could get it if she changes her mind. Daughter understood and appreciated the phone call.

## 2023-07-28 NOTE — TELEPHONE ENCOUNTER
Please let patient know that her specialists (Dr Rouse in Rheum and Dr Francis in Pulm) DO NOT see a reason that she shouldn't get the pneumovax. So overall all of her doctors are in agreement that she CAN get it.    If she wants to get the pneumonia vaccine we can do it in the clinic, or through a mobile NP.    Thanks,  MD Lonnie Reed MD; Génesis Rosario MD; Brianda Rosario:  I agree with Dr. Rouse.  Thanks, DET           Previous Messages       ----- Message -----   From: Lonnie Rouse MD   Sent: 7/26/2023   1:32 PM CDT   To: Baldomero Francis MD, Génesis Rosario MD, *     No contraindication for pneumovax from my perspective   Wm   ----- Message -----   From: Génesis Rosario MD   Sent: 7/24/2023   1:34 PM CDT   To: Lonnie Rouse MD, Baldomero Francis MD, *     Hi Suzanna Francis and Nasim,   I am PCP for Dr Lux. Family is reporting that she was informed that she should NOT take the PNAvax. I want to clarify whether she should not take the vaccine, versus there is no benefit of her taking it. If she could benefit from taking it without it causing her harm, then I can continue advising her to have it done. Otherwise, if it could cause her harm then I will take it off her chart as a reminder.     Thanks,   Génesis Rosario MD/MPH   [facility]   [phone] [facility]

## 2023-08-14 PROBLEM — N17.9 AKI (ACUTE KIDNEY INJURY): Status: RESOLVED | Noted: 2019-03-13 | Resolved: 2023-01-01

## 2023-08-14 NOTE — PROGRESS NOTES
In an attempt to complete monthly [facility] check-in, EBONIE LVM for CG and remains available.  EBONIE will make second attempt 8/18/23.

## 2023-08-18 NOTE — PROGRESS NOTES
Protocol: The [facility] Consortium Effectiveness Study  Identifier: [id number]  IRB#: [id number]  PI: Dr. Adrien Lagos, PhD  CO-I: Dr. Laura Waller PsyD  Version Date: 12/05/2022  Pt Study ID: [id number]  Visit Month: M5  Care Plan documented on: (4/10/23) - Please refer to note for more information.      Timeline:   (*Note for CTN - complete timeline using completed or planned date for study events. Add any important events (i.e. Withdrawals, Lost to Follow Up, Adverse Events, etc.).     Consent: Completed(4/5/23)  Baseline questionnaires: Completed(4/5/23)  6-month questionnaires: Planned(9/2023)  12-month questionnaires: Planned(3/2024)        Visit Note:   Spoke with caregiver Dr. Sethi (daughter).  CG's son and his GF have been visiting for the last 6 weeks.  CG said this has been very enjoyable.  Pt had abnormal labs, having a hard time staying cool, and struggling to stay hydrated. SW provided tips for this.  Cg discussed anti-depressant/anti-anx med for pt.  She has spoken to Dr. Rosario about this.  SW will msg Dr. Gallardo to inquire about status.  Pt has begun having hallucinations.  CG not sure is this is from dementia or sodium imbalance. Discussed ways to manage hallucinations.        Falls in the past month: 1  UTIs in the past month: 0  Hospital encounters in the past month (reported by caregiver): 0        Next monthly visit scheduled for: 9/18/23. Caregiver knows how to reach CTN, and is encouraged to do so, as needed before our next scheduled call.      Action items for CTN: Msg. Dr. Rosario

## 2023-08-18 NOTE — TELEPHONE ENCOUNTER
----- Message from Shira Lee LCSW sent at 8/18/2023 12:09 PM CDT -----  Regarding: anti-anx med  Good morning,  I spoke with this CG today and she expressed that she thinks pt would benefit from an anti anx med.  She believes anxiety is having a negative effect on pt's sleep.  Just passing this along.    I've included Elsa because pt seems to be a bit of a complex pharmacological picture.  Thank you both.   Shira  [phone]

## 2023-08-18 NOTE — TELEPHONE ENCOUNTER
Patricia,  Patient was started on trazodone in May for insomnia. This also is an antidepressant and could help with getting her sleep.    An antidepressant, like lexapro or sertraline, might help. Otherwise buspar PRN could be used.    Otherwise, I think she does have polypharmacy, so adding anything could cause other issues.    Thanks,  Dr VILLALOBOS

## 2023-08-24 NOTE — TELEPHONE ENCOUNTER
Patient has insomnia, sun downing and hypOnatremia.    The med she is on that can cause hyponatremia is coreg. BPs are below goal, therefore will wean off coreg. Advised to take 1 tablets daily for 1 week, then 1 tablet every other day for 1 week.    Patient is currently taking baclofen 5mg at lunch, 5mg at dinner and 10mg at bedtime. Additionally she is taking gabapentin 100mg BID, gabapentin 300mg to 600mg at bedtime. Daughters state that patient needs this for her current pain related to RA. They want to continue this, and state that it is not enough to get her to sleep.    Trazodone is not helping her sleep, it has not been taken in months. Therefore it will be discontinued.    Starting an SSRI may worsen her hypOnatremia, therefore mobile NP and PCP are recommending seroquel PRN at bedtime. Daughters advised that she can take a half tablet and see how she responds before trying a whole tablet of 25mg. They were informed of the risk of death with antipsychotics, and were willing to accept that at this time.    Daughters also advised to practice sleep hygiene, and turn the tv off at night. They were also told to use red lights in the bedroom instead of tv. They will try all of these interventions and update PCP in about 1 week.    Thanks,  Dr VILLALOBOS

## 2023-08-24 NOTE — TELEPHONE ENCOUNTER
PCP called patient's daughter, Susu Rosado, who did not  the phone. Message left with my cell phone number.    Dr VILLALOBOS

## 2023-08-25 NOTE — TELEPHONE ENCOUNTER
Please fax progress note from 7/24 (it's on the printer) to the DME agency that is providing her foot orthotic. You will have to call daughter to get the name of the agency where the note needs to go. This is for her AFO foot orthotic to treat foot drop.    Thanks,  Dr VILLALOBOS

## 2023-08-31 NOTE — ED TRIAGE NOTES
"Per daughter-states pt has been more confused and "going down hill" the past few days.  States she thinks she is dehydrated.  Pt has also been losing "some motor skills".  States she didn't sleep for 3 days.  Pt had a syncopal episode in triage-BP 60's.  States she has had similar episodes in the past but improves after sleep.    "

## 2023-08-31 NOTE — ED NOTES
Patient identifiers verified and correct for Selma Lux  LOC: The patient is drowsy.  the patient is oriented x 2 and speaking appropriately.   APPEARANCE: Patient appears comfortable and in no acute distress, patient is clean and well groomed.  SKIN: The skin is warm and dry, color consistent with ethnicity, patient has normal skin turgor and moist mucus membranes, skin intact, no breakdown or bruising noted.   MUSCULOSKELETAL: Patient moving all extremities spontaneously, no swelling noted.  RESPIRATORY: Airway is open and patent, respirations are spontaneous, patient has a normal effort and rate, no accessory muscle.  CARDIAC: Pt placed on cardiac monitor. Patient has a normal rate and regular rhythm, no edema noted, capillary refill < 3 seconds.   GASTRO: Soft and non tender to palpation, no distention noted.  : Pt denies any pain or frequency with urination.  NEURO: Pt opens eyes to voice.  behavior appropriate to situation, follows commands, facial expression symmetrical, bilateral hand grasp equal and even, purposeful motor response noted, normal sensation in all extremities when touched with a finger.  Pt able to follow commands.

## 2023-08-31 NOTE — ED NOTES
Came to triage, family reports jerked on 5 side, and not responding like before, responds to pain, resp even, charge darin aware needing bed, change in status

## 2023-08-31 NOTE — ED NOTES
I-STAT Chem-8+ Results:   Value Reference Range   Sodium 131 136-145 mmol/L   Potassium  5.0 3.5-5.1 mmol/L   Chloride 11  mmol/L   Ionized Calcium 1.10 1.06-1.42 mmol/L   CO2 (measured) 23 23-29 mmol/L   Glucose 109  mg/dL   BUN 32 6-30 mg/dL   Creatinine 2.1 0.5-1.4 mg/dL   Hematocrit 34 36-54%

## 2023-08-31 NOTE — PROGRESS NOTES
ED Code Sepsis activated at  on 08/31 @ 1428    Patients allergies to antibiotics:  NKDA    Patients chart reviewed 15 minutes after code sepsis activation. ED Provider ordered the following broad-spectrum antibiotics:  Zosyn 4.5 g + Vancomycin 1500    Edouard Posadas  [id number]

## 2023-08-31 NOTE — ED NOTES
Pt resting comfortably.  No distress noted.  Pt more awake, opens eyes to voice.  Pt remains on all monitoring devices.  Daughter at bedside.  Will continue to monitor.

## 2023-08-31 NOTE — PROGRESS NOTES
Dr. Sethi, CG, called EBONIE to inform that pt is in observation at [facility] due to prob stroke.  She said her hope is that pt will be d/c to SNF and family would consider transitioning pt to nursing home nursing home care.  SW provided psycoeducation and support and remains available.

## 2023-08-31 NOTE — ED PROVIDER NOTES
Encounter Date: 8/31/2023       History     Chief Complaint   Patient presents with    Altered Mental Status     Decline over past 10 d     Pt is an 88 yo F with PMH of vascular dementia, subdural hematoma, tertiary adrenal insufficiency, hypertension, COPD, cryptogenic organizing pneumonia, RA (on Plaquenil and CellCept), paroxysmal Afib who presents with AMS. She has not seemed like her self for the past few days but has had an overall decline for the past 10 days. Daughter is at bedside providing most of the history.     The history is provided by the patient, medical records and a relative. The history is limited by the condition of the patient.     Review of patient's allergies indicates:  No Known Allergies  Past Medical History:   Diagnosis Date    Acute cystitis without hematuria 2/27/2020    Acute hypoxemic respiratory failure 4/11/2018    Acute respiratory failure with hypoxia 3/2/2020    KERMIT (acute kidney injury) 3/13/2019    Altered mental status     Anemia     Arthritis     Bilateral hand pain 3/14/2018    Branch retinal vein occlusion, left eye 2/20/2015    Chronic bilateral low back pain without sciatica 3/23/2017    Chronic renal failure in pediatric patient, stage 3 (moderate) 4/15/2018    Chronic renal failure syndrome, stage 3 (moderate) 4/15/2018    Chronic systolic (congestive) heart failure 8/6/2021    Last Assessment & Plan:  Formatting of this note might be different from the original. 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 Hospitalization late 2021, w/ rehab to follow (no notes available) --12/13/2021-CT head revealed thin extra-axial hyperdense collection overlying the right cerebral convexity compatible with acute subdural hematoma, neurosurgery was consulted, patient was noted with Keppra 500 mg b.i.d., apixaban was held -12/19/    Iron deficiency anemia secondary to inadequate dietary iron intake 6/24/2013    Joint pain     Keratoconjunctivitis sicca of both eyes not specified as Sjogren's 7/29/2016    Leiomyoma of uterus, unspecified 9/16/2014    Long QT interval 6/29/2016    Due to medication (plaquenil)     Macular edema 1/12/2015    Multinodular goiter 1/12/2016    Neuropathy 8/23/2017    Plaquenil causing adverse effect in therapeutic use 10/7/2016    Pneumococcal vaccine refused 8/17/2016    Pneumonia due to Streptococcus pneumoniae 4/5/2018    Poor nutrition 12/23/2018    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/2015    RA (rheumatoid arthritis) 12/13/2021    -managed by rhematology, since this is a duplicate problem list entry, will archive this     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     Past Surgical History:   Procedure Laterality Date    BREAST CYST EXCISION      CATARACT EXTRACTION      COLONOSCOPY N/A 9/29/2015    Procedure: COLONOSCOPY;  Surgeon: FIDELINA Sanchez MD;   Location: [facility] ENDO (4TH FLR);  Service: Endoscopy;  Laterality: N/A;    EYE SURGERY      INJECTION OF ANESTHETIC AGENT AROUND NERVE Left 4/19/2021    Procedure: BLOCK, NERVE, FEMORAL AND OBTURATOR;  Surgeon: Shivam Gonzalez MD;  Location: [facility] MGT;  Service: Pain Management;  Laterality: Left;  consent needed    JOINT REPLACEMENT      right knee    KNEE SURGERY Left 12/31/2013    TKR    left parotidectomy      mixed tumor    RI EVAL,SWALLOW FUNCTION,CINE/VIDEO RECORD  6/5/2021         SALIVARY GLAND SURGERY      TONSILLECTOMY      UPPER GASTROINTESTINAL ENDOSCOPY       Family History   Problem Relation Age of Onset    Hypertension Mother     Heart disease Mother     Hyperthyroidism Mother     Prostate cancer Father         prostate cancer    Cancer Father     Breast cancer Maternal Grandmother     Hyperthyroidism Other     Colon cancer Neg Hx      Social History     Tobacco Use    Smoking status: Never     Passive exposure: Never    Smokeless tobacco: Never   Substance Use Topics    Alcohol use: Yes     Alcohol/week: 0.0 standard drinks of alcohol     Comment: very seldom     Drug use: No         Physical Exam     Initial Vitals [08/31/23 1404]   BP Pulse Resp Temp SpO2   (!) 93/52 95 20 98.5 °F (36.9 °C) 96 %      MAP       --         Physical Exam    Nursing note and vitals reviewed.  Constitutional: She appears well-developed and well-nourished. She is not diaphoretic. No distress.   HENT:   Head: Normocephalic and atraumatic.   Eyes: Conjunctivae and EOM are normal.   Neck: Neck supple.   Normal range of motion.  Cardiovascular:  Normal rate and regular rhythm.           Pulmonary/Chest: No respiratory distress. She exhibits no tenderness.   Abdominal: Abdomen is soft. She exhibits no distension. There is no abdominal tenderness.   Musculoskeletal:         General: No edema.      Cervical back: Normal range of motion and neck supple.      Comments: Passive ROM without pain  She can elevate her legs off the  bed     Neurological: She is alert. GCS score is 15. GCS eye subscore is 4. GCS verbal subscore is 5. GCS motor subscore is 6.   Somnolent but arousable   Skin: Skin is warm and dry.   Psychiatric:   Difficult to assess due to clinical condition         ED Course   Procedures  Labs Reviewed   COMPREHENSIVE METABOLIC PANEL - Abnormal; Notable for the following components:       Result Value    Sodium 132 (*)     CO2 18 (*)     BUN 28 (*)     Creatinine 1.8 (*)     Total Bilirubin 1.9 (*)     eGFR 27.3 (*)     All other components within normal limits   URINALYSIS, REFLEX TO URINE CULTURE - Abnormal; Notable for the following components:    Protein, UA Trace (*)     Leukocytes, UA 2+ (*)     All other components within normal limits    Narrative:     Specimen Source->Urine   URINALYSIS MICROSCOPIC - Abnormal; Notable for the following components:    WBC, UA 9 (*)     Non-Squam Epith 1 (*)     All other components within normal limits    Narrative:     Specimen Source->Urine   CBC W/ AUTO DIFFERENTIAL - Abnormal; Notable for the following components:    RBC 3.66 (*)     Hemoglobin 10.1 (*)     Hematocrit 33.1 (*)     MCHC 30.5 (*)     RDW 16.4 (*)     Platelets 107 (*)     Immature Granulocytes 1.2 (*)     Immature Grans (Abs) 0.08 (*)     Mono # 1.4 (*)     Mono % 20.7 (*)     All other components within normal limits   POCT GLUCOSE - Abnormal; Notable for the following components:    POCT Glucose 114 (*)     All other components within normal limits   ISTAT PROCEDURE - Abnormal; Notable for the following components:    POC BUN 32 (*)     POC Creatinine 2.1 (*)     POC Sodium 131 (*)     POC Hematocrit 34 (*)     All other components within normal limits   ISTAT PROCEDURE - Abnormal; Notable for the following components:    POC PCO2 46.8 (*)     POC PO2 22 (*)     POC SATURATED O2 34 (*)     POC Hematocrit 32 (*)     All other components within normal limits   INFLUENZA A & B BY MOLECULAR   MAGNESIUM   TROPONIN I    TSH   LACTIC ACID, PLASMA   SARS-COV-2 RNA AMPLIFICATION, QUAL    Narrative:     Is the patient symptomatic?->No  Is this needed for pre-procedure or pre-op testing?->No   POCT GLUCOSE     EKG Readings: (Independently Interpreted)   EKG at 3:09 p.m. normal sinus rhythm rate of 79 compared with EKG from 05/12/2023 PVCs in her longer present     ECG Results              EKG 12-lead (Final result)  Result time 08/31/23 17:13:25      Final result by Interface, Lab In [facility] (08/31/23 17:13:25)                   Narrative:    Test Reason : R41.82,    Vent. Rate : 079 BPM     Atrial Rate : 079 BPM     P-R Int : 150 ms          QRS Dur : 094 ms      QT Int : 418 ms       P-R-T Axes : 062 -15 061 degrees     QTc Int : 479 ms    Normal sinus rhythm  Normal ECG  When compared with ECG of 12-MAY-2023 16:50,  Premature ventricular complexes are no longer Present  The axis Shifted right  Criteria for Lateral infarct are no longer Present probably due to limb  lead correction  Confirmed by Jerod MICHAEL MD ([id number]) on 8/31/2023 5:13:13 PM    Referred By: [email or web address]   SELF           Confirmed By:Jerod MICHAEL MD                                  Imaging Results              MRI Brain Without Contrast (Final result)  Result time 08/31/23 19:55:32      Final result by Flo Scott MD (08/31/23 19:55:32)                   Impression:      No acute intracranial process.    Involutional and chronic microvascular ischemic changes including unchanged scattered remote lacunar type infarcts.    Electronically signed by resident: Adrien Madrid  Date:    08/31/2023  Time:    19:15    Electronically signed by: Flo Scott MD  Date:    08/31/2023  Time:    19:55               Narrative:    EXAMINATION:  MRI BRAIN WITHOUT CONTRAST    CLINICAL HISTORY:  Mental status change, unknown cause;.    TECHNIQUE:  Multiplanar multisequence MR imaging of the brain was performed without contrast.    COMPARISON:  MRI brain 05/12/2023, CT head  08/31/2023    FINDINGS:  Intracranial compartment:    There is cerebral volume loss with commensurate prominence of ventricles and sulci.  No acute hydrocephalus.  No extra-axial blood or fluid collections.    Scattered extensive stable remote lacunar type infarcts involving the right tatyana, bilateral cerebellar hemispheres, left thalamus, basal ganglia and periventricular white matter.  Patchy and confluent supratentorial T2 FLAIR signal hyperintensities compatible with chronic microvascular ischemic change.  No acute infarct or acute parenchymal hemorrhage.    Sellar and parasellar structures are unremarkable.    Major skull base vascular flow voids are preserved.    Skull/extracranial contents (limited evaluation): Bone marrow signal intensity is normal.  Postoperative changes of the globes.  Minimal paranasal sinus mucosal thickening.                                       CT Head Without Contrast (Final result)  Result time 08/31/23 16:37:33      Final result by Carlos Bull MD (08/31/23 16:37:33)                   Impression:      Stable appearance of the brain with no acute intracranial process.  Chronic ischemic changes again identified.      Electronically signed by: Carlos Bull  Date:    08/31/2023  Time:    16:37               Narrative:    EXAMINATION:  CT HEAD WITHOUT CONTRAST    CLINICAL HISTORY:  Mental status change, unknown cause;    TECHNIQUE:  Low dose axial images were obtained through the head.  Coronal and sagittal reformations were also performed. Contrast was not administered.    COMPARISON:  MRI/CT 05/12/2023    FINDINGS:  There is no evidence of hydrocephalus mass effect intracranial hemorrhage or acute territorial infarct.  Similar mild diffuse volume loss.    Decreased attenuation within the periventricular white matter is months nonspecific but may reflect moderate chronic small vessel ischemic change.  Chronic infarcts within the right deep white matter, bilateral thalamus, right  tatyana, and right cerebellum again noted.    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    The calvarium is intact.  Prominent atherosclerotic vascular calcifications at the skull base.    The visualized sinuses and mastoid air cells are clear.                                       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  This is similar to prior examinations.    No acute chest disease identified.      Electronically signed by: Edgar Ramos MD  Date:    08/31/2023  Time:    15:58               Narrative:    EXAMINATION:  XR CHEST AP PORTABLE    CLINICAL HISTORY:  ams;    TECHNIQUE:  Single frontal view of the chest was performed.    COMPARISON:  Twenty-three.    FINDINGS:  The heart does not appear enlarged.  Atherosclerotic calcification is present within the aortic arch.  There is a superior mediastinal mass extending from the thoracic inlet and deviating the trachea towards the left consistent with the patient's known large substernal goiter.  This is similar to prior examinations.  Pulmonary vasculature appears to be within normal limits.  The lungs are free of focal consolidations.  There is no evidence for pneumothorax or large pleural effusions.  Bony structures are grossly intact.  There are prominent degenerative changes of the shoulders.                                       Medications   apixaban tablet 2.5 mg (2.5 mg Oral Given 9/2/23 0855)   atorvastatin tablet 40 mg (40 mg Oral Given 9/1/23 2045)   fluticasone furoate-vilanteroL 100-25 mcg/dose diskus inhaler 1 puff (1 puff Inhalation Given 9/1/23 0826)   gabapentin capsule 100 mg (100 mg Oral Given 9/2/23 0856)   gabapentin capsule 300 mg (300 mg  Oral Given 9/1/23 2046)   magnesium oxide tablet 400 mg (400 mg Oral Given 9/2/23 0855)   pantoprazole EC tablet 40 mg (40 mg Oral Given 9/2/23 0855)   hydrOXYchloroQUINE tablet 200 mg (200 mg Oral Given 9/2/23 0855)   sodium chloride 0.9% flush 10 mL (has no administration in time range)   melatonin tablet 6 mg (6 mg Oral Given 8/31/23 2250)   polyethylene glycol packet 17 g (17 g Oral Given 9/2/23 0855)   senna-docusate 8.6-50 mg per tablet 1 tablet (1 tablet Oral Given 9/2/23 0856)   acetaminophen tablet 650 mg (has no administration in time range)   acetaminophen tablet 1,000 mg (has no administration in time range)   naloxone 0.4 mg/mL injection 0.02 mg (has no administration in time range)   glucose chewable tablet 16 g (has no administration in time range)   glucose chewable tablet 24 g (has no administration in time range)   glucagon (human recombinant) injection 1 mg (has no administration in time range)   dextrose 10% bolus 125 mL 125 mL (has no administration in time range)   dextrose 10% bolus 250 mL 250 mL (has no administration in time range)   trazodone split tablet 25 mg (25 mg Oral Given 9/1/23 0154)   quetiapine split tablet 12.5 mg (12.5 mg Oral Given 9/1/23 2231)   sodium chloride 0.9% bolus 1,000 mL 1,000 mL (0 mLs Intravenous Stopped 8/31/23 1916)   piperacillin-tazobactam (ZOSYN) 4.5 g in dextrose 5 % in water (D5W) 100 mL IVPB (MB+) (0 g Intravenous Stopped 8/31/23 1709)   vancomycin 1,500 mg in dextrose 5 % (D5W) 250 mL IVPB (Vial-Mate) (0 mg Intravenous Stopped 8/31/23 1916)     Medical Decision Making  Pt was initially very hypotensive and was difficult to arouse but her blood pressure improved along with her mental status.   She has an KERMIT which maybe related to her hypotension.  Septic work up did not reveal any infection  MRI neg for acute CVA  She does not appear to be bleeding   Blood pressure has remained improved  Discussed with CM and HM, patient admitted    Problems  Addressed:  KERMIT (acute kidney injury): acute illness or injury  AMS (altered mental status): acute illness or injury    Amount and/or Complexity of Data Reviewed  Labs: ordered.  Radiology: ordered.    Risk  Decision regarding hospitalization.              Attending Attestation:         Attending Critical Care:   Critical Care Times:   ==============================================================  Total Critical Care Time - exclusive of procedural time: 35 minutes.  ==============================================================  Critical care was time spent personally by me on the following activities: examination of patient, obtaining history from patient or relative, review of old charts, development of treatment plan with patient or relative, ordering lab, x-rays, and/or EKG, ordering and performing treatments and interventions, evaluation of patient's response to treatment, discussion with consultants and re-evaluation of patient's conition.   Critical Care Condition: potentially life-threatening                           Clinical Impression:   Final diagnoses:  [R41.82] AMS (altered mental status)  [N17.9] KERMIT (acute kidney injury) (Primary)  [I95.9] Hypotension, unspecified hypotension type        ED Disposition Condition    Observation                 Zari Ahumada MD  09/02/23 0469

## 2023-09-01 NOTE — H&P
[facility] [address] [place] [address] Medicine  History & Physical    Patient Name: Selma Lux  MRN: [id number]  Patient Class: OP- Observation  Admission Date: 8/31/2023  Attending Physician: Prashanth Pena MD   Primary Care Provider: Génesis Rosario MD         Patient information was obtained from patient, relative(s) and ER records.     Subjective:     Principal Problem:Delirium    Chief Complaint:   Chief Complaint   Patient presents with    Altered Mental Status     Decline over past 10 d        HPI: Patient is a 88 yo F with PMH of vascular dementia, subdural hematoma, adrenal insufficiency, hypertension, COPD, RA with ILD on hydroxychloroquine and mycophenolate, pAF who is presenting with an acute on chronic mental status change. Patient is accompanied by her daughter who provides much of the history although the patient is able to answer questions about ROS.     In the ED,  Patient hypotensive on admit but rapidly corrected without fluid however patient did eventually receive a L of LR and pip/tazo plus vanc for empiric sepsis. Blood cultures drawn and pending. Lab w/u performed in ED revealed stable anemia without leukocytosis. CXR clear. UA not c/w with UTI. MRI and CT head unrevealing for any acute change. Patient did have what appears to be an KERMIT on her CMP although her creatinine has been fluctuating at a higher level than prior to may 2023.       Past Medical History:   Diagnosis Date    Acute cystitis without hematuria 2/27/2020    Acute hypoxemic respiratory failure 4/11/2018    Acute respiratory failure with hypoxia 3/2/2020    KERMIT (acute kidney injury) 3/13/2019    Altered mental status     Anemia     Arthritis     Bilateral hand pain 3/14/2018    Branch retinal vein occlusion, left eye 2/20/2015    Chronic bilateral low back pain without sciatica 3/23/2017    Chronic renal failure in pediatric patient, stage 3 (moderate) 4/15/2018    Chronic renal failure syndrome,  stage 3 (moderate) 4/15/2018    Chronic systolic (congestive) heart failure 8/6/2021    Last Assessment & Plan:  Formatting of this note might be different from the original. -last ANTOLIN was done on 03/01/2020:  Grade 1 mild left ventricular diastolic dysfunction    Cognitive communication deficit 12/19/2017    COPD exacerbation 1/23/2022    Cystoid macular edema, left eye 2/20/2015    Cystoid macular edema, left eye 2/20/2015    Delirium 12/30/2021    DJD (degenerative joint disease) of cervical spine 5/15/2013    Enterococcal bacteremia     Fatty liver 8/26/2014    Goiter 4/9/2018    Hashimoto's disease     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    History of 2019 novel coronavirus disease (COVID-19) 4/11/2022 2/2022    Hypertension     Hypertensive encephalopathy     IBS (irritable bowel syndrome) 6/21/2017    IGT (impaired glucose tolerance) 1/12/2016    Intracranial subdural hematoma 1/4/2022    Last Assessment & Plan: Formatting of this note might be different from the original. Hospitalization late 2021, w/ rehab to follow (no notes available) --12/13/2021-CT head revealed thin extra-axial hyperdense collection overlying the right cerebral convexity compatible with acute subdural hematoma, neurosurgery was consulted, patient was noted with Keppra 500 mg b.i.d., apixaban was held -12/19/    Iron deficiency anemia secondary to inadequate dietary iron intake 6/24/2013    Joint pain     Keratoconjunctivitis sicca of both eyes not specified as Sjogren's 7/29/2016    Leiomyoma of uterus, unspecified 9/16/2014    Long QT interval 6/29/2016    Due to medication (plaquenil)     Macular edema 1/12/2015    Multinodular goiter 1/12/2016    Neuropathy 8/23/2017    Plaquenil causing adverse effect in therapeutic use 10/7/2016    Pneumococcal vaccine refused 8/17/2016    Pneumonia due to Streptococcus pneumoniae 4/5/2018    Poor nutrition 12/23/2018    Primary osteoarthritis involving multiple joints  10/21/2015    RA (rheumatoid arthritis) 12/13/2021    -managed by rhematology, since this is a duplicate problem list entry, will archive this     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    s/p Right Total knee replacement 5/15/2013    Scoliosis of thoracic spine 5/15/2013    Small vessel disease, cerebrovascular 12/28/2017    Stroke     Traumatic subdural hemorrhage with loss of consciousness of unspecified duration, initial encounter 1/10/2023    12/2021    UTI (urinary tract infection) 12/29/2018    Vascular dementia 12/6/2017       Past Surgical History:   Procedure Laterality Date    BREAST CYST EXCISION      CATARACT EXTRACTION      COLONOSCOPY N/A 9/29/2015    Procedure: COLONOSCOPY;  Surgeon: FIDELINA Sanchez MD;  [facility]  Service: Endoscopy;  Laterality: N/A;    EYE SURGERY      INJECTION OF ANESTHETIC AGENT AROUND NERVE Left 4/19/2021    Procedure: BLOCK, NERVE, FEMORAL AND OBTURATOR;  Surgeon: Shivam Gonzalez MD;  Location[facility] PAIN MGT;  Service: Pain Management;  Laterality: Left;  consent needed    JOINT REPLACEMENT      right knee    KNEE SURGERY Left 12/31/2013    TKR    left parotidectomy      mixed tumor    NC EVAL,SWALLOW FUNCTION,CINE/VIDEO RECORD  6/5/2021         SALIVARY GLAND SURGERY      TONSILLECTOMY      UPPER GASTROINTESTINAL ENDOSCOPY         Review of patient's allergies indicates:  No Known Allergies    No current facility-administered medications on file prior to encounter.     Current Outpatient Medications on File Prior to Encounter   Medication Sig    albuterol-ipratropium (DUO-NEB) 2.5 mg-0.5 mg/3 mL nebulizer solution Take 3 mLs by nebulization 2 (two) times daily as needed for Shortness of Breath. Rescue    apixaban (ELIQUIS) 2.5 mg Tab Take 1 tablet (2.5 mg total) by mouth 2 (two) times daily.    atorvastatin (LIPITOR) 40 MG tablet Take 1 tablet (40 mg total) by mouth every evening.    baclofen (LIORESAL) 10 MG tablet Take 0.5  tablets (5 mg total) by mouth with lunch AND 0.5 tablets (5 mg total) daily with dinner or evening meal AND 1 tablet (10 mg total) every evening.    cholecalciferol, vitamin D3, 125 mcg (5,000 unit) capsule Take 1 capsule (5,000 Units total) by mouth once daily.    ferrous sulfate, dried (SLOW FE) 160 mg (50 mg iron) TbSR Take 1 tablet (160 mg total) by mouth every other day.    fluticasone-salmeterol diskus inhaler 100-50 mcg Inhale 1 puff into the lungs 2 (two) times daily. Controller    gabapentin (NEURONTIN) 100 MG capsule Take 1 capsule (100 mg total) by mouth 2 (two) times daily.    gabapentin 5% baclofen 2% amitriptyline 2% topical cream Apply topically 3 (three) times daily.    hydrOXYchloroQUINE (PLAQUENIL) 200 mg tablet Take 1 tablet (200 mg total) by mouth 2 (two) times daily.    LIDOcaine (LIDODERM) 5 % Place 1 patch onto the skin once daily. Remove & Discard patch within 12 hours as needed for pain or as directed by MD Place patch onto lower back as needed    magnesium oxide (MAG-OX) 400 mg (241.3 mg magnesium) tablet Take 400 mg by mouth once daily.    mycophenolate (CELLCEPT) 500 mg Tab Take 1 tablet (500 mg total) by mouth 2 (two) times daily.    pantoprazole (PROTONIX) 40 MG tablet Take 1 tablet (40 mg total) by mouth once daily.    predniSONE (DELTASONE) 10 MG tablet Take 1 tablet (10 mg total) by mouth once daily.    thiamine 100 MG tablet Take 100 mg by mouth once daily.    gabapentin (NEURONTIN) 300 MG capsule Take 1 capsule (300 mg total) by mouth every evening.    QUEtiapine (SEROQUEL) 25 MG Tab Take 1 tablet (25 mg total) by mouth nightly as needed (insomnia). (Patient not taking: Reported on 8/31/2023)    sulfamethoxazole-trimethoprim 800-160mg (BACTRIM DS) 800-160 mg Tab One tablet by mouth every Monday, Wednesday, Friday to prevent lung infection    [DISCONTINUED] acetaminophen (TYLENOL) 500 MG tablet Take 2 tablets (1,000 mg total) by mouth 2 (two) times daily as needed  (Headache).    [DISCONTINUED] calcium carbonate (TUMS) 200 mg calcium (500 mg) chewable tablet Take 2 tablets (1,000 mg total) by mouth once daily. (Patient not taking: No sig reported)    [DISCONTINUED] famotidine (PEPCID) 20 MG tablet Take 1 tablet (20 mg total) by mouth 2 (two) times daily. (Patient not taking: Reported on 6/15/2022)    [DISCONTINUED] lacosamide (VIMPAT) 10 mg/mL Soln oral solution Take 10 mLs (100 mg total) by mouth every 12 (twelve) hours. (Patient not taking: No sig reported)    [DISCONTINUED] traZODone (DESYREL) 50 MG tablet Take 1 tablet (50 mg total) by mouth nightly as needed for Insomnia.     Family History       Problem Relation (Age of Onset)    Breast cancer Maternal Grandmother    Cancer Father    Heart disease Mother    Hypertension Mother    Hyperthyroidism Mother, Other    Prostate cancer Father          Tobacco Use    Smoking status: Never     Passive exposure: Never    Smokeless tobacco: Never   Substance and Sexual Activity    Alcohol use: Yes     Alcohol/week: 0.0 standard drinks of alcohol     Comment: very seldom     Drug use: No    Sexual activity: Not Currently     Comment: ,  age 52,      Review of Systems   Unable to perform ROS: Dementia     Objective:     Vital Signs (Most Recent):  Temp: 98.4 °F (36.9 °C) (08/31/23 2155)  Pulse: 82 (08/31/23 2309)  Resp: 18 (08/31/23 2155)  BP: 137/62 (08/31/23 2155)  SpO2: 97 % (08/31/23 2155) Vital Signs (24h Range):  Temp:  [98.3 °F (36.8 °C)-98.5 °F (36.9 °C)] 98.4 °F (36.9 °C)  Pulse:  [76-95] 82  Resp:  [14-26] 18  SpO2:  [92 %-98 %] 97 %  BP: ()/(41-74) 137/62     Weight: 64.2 kg (141 lb 8.6 oz)  Body mass index is 24.29 kg/m².     Physical Exam  Vitals and nursing note reviewed.   Constitutional:       General: She is not in acute distress.     Appearance: Normal appearance. She is not ill-appearing or toxic-appearing.      Comments: sleepy   HENT:      Head: Normocephalic and atraumatic.       Mouth/Throat:      Mouth: Mucous membranes are moist.   Eyes:      General: No scleral icterus.  Cardiovascular:      Rate and Rhythm: Normal rate. Rhythm irregular.      Pulses: Normal pulses.      Heart sounds: Normal heart sounds.   Pulmonary:      Effort: Pulmonary effort is normal. No respiratory distress.      Breath sounds: Normal breath sounds.   Abdominal:      General: There is no distension.      Palpations: Abdomen is soft. There is no mass.      Tenderness: There is no abdominal tenderness. There is no right CVA tenderness or left CVA tenderness.   Musculoskeletal:      Cervical back: No rigidity.      Right lower leg: No edema.      Left lower leg: No edema.   Lymphadenopathy:      Cervical: No cervical adenopathy.   Skin:     General: Skin is warm and dry.      Capillary Refill: Capillary refill takes less than 2 seconds.      Coloration: Skin is not jaundiced.   Neurological:      General: No focal deficit present.      Mental Status: She is alert and oriented to person, place, and time.                Significant Labs: All pertinent labs within the past 24 hours have been reviewed.    Significant Imaging: I have reviewed all pertinent imaging results/findings within the past 24 hours.    Assessment/Plan:     * Delirium  Patient is an 87-year-old female with past medical history significant for vascular dementia who is presenting with a 10 day history of sleep-wake reversal and worsening delusions/delirium.  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  The patient has tried several medications with the assistance of her neurologist however most have been largely ineffective.  She was trialed on Seroquel and slept for approximately 36 hours however it is unclear if this was due to her inability to sleep prior to this or an adverse effect of the medication.  May warrant a retrial of the medication if  she continues to have altered mental status.  Workup of the patient was unrevealing for sources of infection or metabolic etiologies of her altered mental status/delirium.  Patient appears to be having waxing and waning states and was able to answer most of my questions well however she was somnolent and quickly went to sleep after I was not stimulating her verbally.  The patient's daughter provides much of the history and notes that she is been favoring leaning to her right side more than usual and that there is been a new onset tremor of the right upper extremity.  This may indicate that she is having partial complex seizures or similar cerebellar etiologies which would not show up on her MRI and CT which were both negative.  Of note the patient's labs were notable for an KERMIT however her BUN was not profoundly elevated to suggest that azotemia was the etiology.    Plan:  - EEG  - Monitor CMP and KERMIT  - IV fluids      Heart failure with preserved ejection fraction  Appears stable clinically.     Most recent echo:   The left ventricle is normal in size with concentric hypertrophy and normal systolic function. The estimated ejection fraction is 65%.   Normal right ventricular size with normal right ventricular systolic function.   Grade II left ventricular diastolic dysfunction.   Severe left atrial enlargement.   Mild-to-moderate mitral regurgitation.   The estimated PA systolic pressure is 43 mmHg.   Normal central venous pressure (3 mmHg).               Plan:  - continue to monitor  - euvolemic  - Avoid salt intake > 2 g/day  - magnesium > 2, potassium > 4  - cardiac diet      Vascular Dementia  Able to answer questions. Appears to be relatively somnolent but rouses to questions. She is accompanied by her daughter at bedside who explains that over the last 2 weeks approximately, she has been having disturbed sleep wake cycle and worsening delirium/delusions episodes. The patient's neurologist trialed  several medications which were unhelpful. Seroquel produced profound sedation but unclear if it was because of inability to sleep for several days prior.    COPD (chronic obstructive pulmonary disease)  Patient VBG on admission appears compatible with stable, compensated lung disease. Her home inhaler is not available on formulary however I have ordered an equivalent.    Plan:  - Continue to monitor    Hyperlipidemia  Continue atorvastatin      Pulmonary hypertension due to interstitial lung disease  See rheumatoid arthritis of multiple sites.      KERMIT (acute kidney injury)  Patient with acute kidney injury/acute renal failure likely due to pre-renal azotemia due to dehydration KERMIT is currently unclear if improving or worsening. Baseline creatinine 0.7-1.0 - Labs reviewed- Renal function/electrolytes with Estimated Creatinine Clearance: 19.7 mL/min (A) (based on SCr of 1.8 mg/dL (H)). according to latest data. Monitor urine output and serial BMP and adjust therapy as needed. Avoid nephrotoxins and renally dose meds for GFR listed above.      Plan:  - Obtain urine electrolytes and creatine.  - Check urine protein creatinine ratio.  - Check renal/retroperitoneal US.  - Monitor intake/output and daily standing weights.   - Avoid nephrotoxic agents such as NSAIDs, gadolinium and IV radiocontrast.  - Renally dose meds to current GFR.  - Maintain MAP > 65.      Thrombocytopenia  Appears to be stable. Per PCP Dr. Lee, they were monitoring with periodic CBCs      AF (paroxysmal atrial fibrillation)  Patient with Long standing persistent (>12 months) atrial fibrillation which is uncontrolled currently with previous BB but taken off due to concern for AEs. Patient is currently in sinus rhythm.[id number] Score: 6. Anticoagulation indicated. Anticoagulation done with apixaban 2.5 BID.    Hypertension, essential  Target SBP < 160 while inpatient.    Rheumatoid arthritis of multiple sites  Takes mycophenolate and  hydroxychloroquine at home for ILD and connective tissue manifestatons of RA. Appears to be well controlled without boggy or tender joints on exam and stable pulmonary findings without the need for O2.    Plan:  - Continue HCQ and myco        VTE Risk Mitigation (From admission, onward)         Ordered     apixaban tablet 2.5 mg  2 times daily         08/31/23 2153     IP VTE HIGH RISK PATIENT  Once         08/31/23 2153     Place sequential compression device  Until discontinued         08/31/23 2153                   On 09/01/2023, patient should be placed in hospital observation services under my care.        Marvel Edwards MD  Department of Hospital Medicine  [facility]

## 2023-09-01 NOTE — ASSESSMENT & PLAN NOTE
Patient with acute kidney injury/acute renal failure likely due to pre-renal azotemia due to dehydration KERMIT is currently unclear if improving or worsening. Baseline creatinine 0.7-1.0 - Labs reviewed- Renal function/electrolytes with Estimated Creatinine Clearance: 19.7 mL/min (A) (based on SCr of 1.8 mg/dL (H)). according to latest data. Monitor urine output and serial BMP and adjust therapy as needed. Avoid nephrotoxins and renally dose meds for GFR listed above.      Plan:  - Obtain urine electrolytes and creatine.  - Check urine protein creatinine ratio.  - Check renal/retroperitoneal US.  - Monitor intake/output and daily standing weights.   - Avoid nephrotoxic agents such as NSAIDs, gadolinium and IV radiocontrast.  - Renally dose meds to current GFR.  - Maintain MAP > 65.

## 2023-09-01 NOTE — ASSESSMENT & PLAN NOTE
Takes mycophenolate and hydroxychloroquine at home for ILD and connective tissue manifestatons of RA. Appears to be well controlled without boggy or tender joints on exam and stable pulmonary findings without the need for O2.    Plan:  - Continue HCQ and myco

## 2023-09-01 NOTE — HPI
Patient is a 86 yo F with PMH of vascular dementia, subdural hematoma, adrenal insufficiency, hypertension, COPD, RA with ILD on hydroxychloroquine and mycophenolate, pAF who is presenting with an acute on chronic mental status change. Patient is accompanied by her daughter who provides much of the history although the patient is able to answer questions about ROS.     In the ED,  Patient hypotensive on admit but rapidly corrected without fluid however patient did eventually receive a L of LR and pip/tazo plus vanc for empiric sepsis. Blood cultures drawn and pending. Lab w/u performed in ED revealed stable anemia without leukocytosis. CXR clear. UA not c/w with UTI. MRI and CT head unrevealing for any acute change. Patient did have what appears to be an KERMIT on her CMP although her creatinine has been fluctuating at a higher level than prior to may 2023.

## 2023-09-01 NOTE — ASSESSMENT & PLAN NOTE
Appears stable clinically.     Most recent echo:   The left ventricle is normal in size with concentric hypertrophy and normal systolic function. The estimated ejection fraction is 65%.   Normal right ventricular size with normal right ventricular systolic function.   Grade II left ventricular diastolic dysfunction.   Severe left atrial enlargement.   Mild-to-moderate mitral regurgitation.   The estimated PA systolic pressure is 43 mmHg.   Normal central venous pressure (3 mmHg).               Plan:  - continue to monitor  - euvolemic  - Avoid salt intake > 2 g/day  - magnesium > 2, potassium > 4  - cardiac diet

## 2023-09-01 NOTE — PLAN OF CARE
[facility]  Discharge Assessment    Primary Care Provider: Génesis Rosario MD     Discharge Assessment (most recent)       BRIEF DISCHARGE ASSESSMENT - 09/01/23 0968          Discharge Planning    Assessment Type Discharge Planning Brief Assessment     Resource/Environmental Concerns none     Support Systems Children     Equipment Currently Used at Home bedside commode;walker, rolling;cane, straight     Current Living Arrangements home     Patient/Family Anticipates Transition to home with family     Patient/Family Anticipated Services at Transition home health care     DME Needed Upon Discharge  none     Discharge Plan A Home with family     Discharge Plan B Home with family                   Pt is a 87 y.o. female admitted with delirium and has a PMH of Vascular dementia and COPD. She lives with her 2 daughters in a single story house with 5 steps to enter. She requires assistance with her ADLs and IADLs, daughter assist. Daughters will give pt a ride home. [facility] Discharge Packet given to patient and/or family with understanding verbalized.   name and number and estimated discharge date written on white board in patient's room with request to call for any questions or concerns.  Will continue to follow for needs.  Adrien Kolb RN,BSN

## 2023-09-01 NOTE — ASSESSMENT & PLAN NOTE
Patient is an 87-year-old female with past medical history significant for vascular dementia who is presenting with a 10 day history of sleep-wake reversal and worsening delusions/delirium.  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  The patient has tried several medications with the assistance of her neurologist however most have been largely ineffective.  She was trialed on Seroquel and slept for approximately 36 hours however it is unclear if this was due to her inability to sleep prior to this or an adverse effect of the medication.  May warrant a retrial of the medication if she continues to have altered mental status.  Workup of the patient was unrevealing for sources of infection or metabolic etiologies of her altered mental status/delirium.  Patient appears to be having waxing and waning states and was able to answer most of my questions well however she was somnolent and quickly went to sleep after I was not stimulating her verbally.  The patient's daughter provides much of the history and notes that she is been favoring leaning to her right side more than usual and that there is been a new onset tremor of the right upper extremity.  This may indicate that she is having partial complex seizures or similar cerebellar etiologies which would not show up on her MRI and CT which were both negative.  Of note the patient's labs were notable for an KERMIT however her BUN was not profoundly elevated to suggest that azotemia was the etiology.    Plan:  - EEG  - Monitor CMP and KERMIT  - IV fluids

## 2023-09-01 NOTE — ASSESSMENT & PLAN NOTE
Patient with Long standing persistent (>12 months) atrial fibrillation which is uncontrolled currently with previous BB but taken off due to concern for AEs. Patient is currently in sinus rhythm.[id number] Score: 6. Anticoagulation indicated. Anticoagulation done with apixaban 2.5 BID.

## 2023-09-01 NOTE — PLAN OF CARE
Problem: Infection  Goal: Absence of Infection Signs and Symptoms  Outcome: Ongoing, Progressing     Problem: Adult Inpatient Plan of Care  Goal: Plan of Care Review  Outcome: Ongoing, Progressing  Goal: Patient-Specific Goal (Individualized)  Outcome: Ongoing, Progressing  Goal: Absence of Hospital-Acquired Illness or Injury  Outcome: Ongoing, Progressing  Goal: Optimal Comfort and Wellbeing  Outcome: Ongoing, Progressing  Goal: Readiness for Transition of Care  Outcome: Ongoing, Progressing     Problem: Adjustment to Illness (Delirium)  Goal: Optimal Coping  Outcome: Ongoing, Progressing     Problem: Altered Behavior (Delirium)  Goal: Improved Behavioral Control  Outcome: Ongoing, Progressing     Problem: Attention and Thought Clarity Impairment (Delirium)  Goal: Improved Attention and Thought Clarity  Outcome: Ongoing, Progressing     Problem: Sleep Disturbance (Delirium)  Goal: Improved Sleep  Outcome: Ongoing, Progressing     Problem: Fluid and Electrolyte Imbalance (Acute Kidney Injury/Impairment)  Goal: Fluid and Electrolyte Balance  Outcome: Ongoing, Progressing     Problem: Oral Intake Inadequate (Acute Kidney Injury/Impairment)  Goal: Optimal Nutrition Intake  Outcome: Ongoing, Progressing     Problem: Renal Function Impairment (Acute Kidney Injury/Impairment)  Goal: Effective Renal Function  Outcome: Ongoing, Progressing     Problem: Impaired Wound Healing  Goal: Optimal Wound Healing  Outcome: Ongoing, Progressing     Problem: Fall Injury Risk  Goal: Absence of Fall and Fall-Related Injury  Outcome: Ongoing, Progressing     Problem: Skin Injury Risk Increased  Goal: Skin Health and Integrity  Outcome: Ongoing, Progressing     Problem: Cognitive Impairment (Dementia Signs/Symptoms)  Goal: Optimized Cognitive Function (Dementia Signs/Symptoms)  Outcome: Ongoing, Progressing     Problem: Sleep Disturbance (Dementia Signs/Symptoms)  Goal: Improved Sleep (Dementia Signs/Symptoms)  Outcome: Ongoing,  Progressing     Problem: COPD (Chronic Obstructive Pulmonary Disease) Comorbidity  Goal: Maintenance of COPD Symptom Control  Outcome: Ongoing, Progressing     Problem: Heart Failure Comorbidity  Goal: Maintenance of Heart Failure Symptom Control  Outcome: Ongoing, Progressing     Problem: Hypertension Comorbidity  Goal: Blood Pressure in Desired Range  Outcome: Ongoing, Progressing

## 2023-09-01 NOTE — ASSESSMENT & PLAN NOTE
Patient VBG on admission appears compatible with stable, compensated lung disease. Her home inhaler is not available on formulary however I have ordered an equivalent.    Plan:  - Continue to monitor

## 2023-09-01 NOTE — NURSING
Pt is AAOx1.  Pt orientated to self.No distress noted. Pt has left sided weakness alone with mild right side.Pt has family member at bedside.Pt has small abrasion to buttocks will continue to monitor. Fall precaution in placed.    Call light in reach. Bed in low locked position.   Side rails x2. Belongings at bedside.   Pt free of fall and injuries Questions and concerns voiced and answered with family member  Plan of care continues.

## 2023-09-01 NOTE — CARE UPDATE
Has 4/5 proximal upper extremity strength bilaterally   Left-sided lower extremity weakness noted from stroke-chronic   Pupils equal bilaterally  Chronic left-sided facial droop   On room air, no cyanosis  Heart sounds indicate a regular rate and rhythm   Clear lungs bilaterally, unlabored breathing     EEG neg for acute epileptiform activity     Neurology consulted    Can consider trial of 12.5 mg seroquel tonight if agitation is severe

## 2023-09-01 NOTE — PROCEDURES
EEG    Date/Time: 8/31/2023 2:48 PM    Performed by: Gurmeet Rouse MD  Authorized by: Marvel Edwards MD      ELECTROENCEPHALOGRAM REPORT    DATE OF SERVICE:   EEG NUMBER:   REQUESTED BY:   LOCATION OF SERVICE:     METHODOLOGY   Electroencephalographic (EEG) recording is with electrodes placed according to the International 10-20 placement system.  Thirty two (32) channels of digital signal (sampling rate of 512/sec) including T1 and T2 was simultaneously recorded from the scalp and may include  EKG, EMG, and/or eye monitors.  Recording band pass was 0.1 to 512 hz.  Digital video recording of the patient is simultaneously recorded with the EEG.  The patient is instructed report clinical symptoms which may occur during the recording session.  EEG and video recording is stored and archived in digital format. Activation procedures which include photic stimulation, hyperventilation and instructing patients to perform simple task are done in selected patients.    The EEG is displayed on a monitor screen and can be reviewed using different montages.  Computer assisted analysis is employed to detect spike and electrographic seizure activity.   The entire record is submitted for computer analysis.  The entire recording is visually reviewed and the times identified by computer analysis as being spikes or seizures are reviewed again.  Compresses spectral analysis (CSA) is also performed on the activity recorded from each individual channel.  This is displayed as a power display of frequencies from 0 to 30 Hz over time.   The CSA is reviewed looking for asymmetries in power between homologous areas of the scalp and then compared with the original EEG recording.     Preventes.fr software was also utilized in the review of this study.  This software suite analyzes the EEG recording in multiple domains.  Coherence and rhythmicity is computed to identify EEG sections which may contain organized seizures.  Each channel undergoes  analysis to detect presence of spike and sharp waves which have special and morphological characteristic of epileptic activity.  The routine EEG recording is converted from spacial into frequency domain.  This is then displayed comparing homologous areas to identify areas of significant asymmetry.  Algorithm to identify non-cortically generated artifact is used to separate eye movement, EMG and other artifact from the EEG    Start on 9/1/23 at 11:31  Stop on 9/1/23 at 13:20    EEG FINDINGS  The record shows a fair  organization at rest, with no discernible posterior dominant rhythm with fair  reactivity. There is mild bilateral beta activity. There is continuous diffuse delta and theta range background slowing.    Drowsiness is characterized by attenuation of the background, vertex waves, and further bilateral slowing.     Provocative maneuvers including hyperventilation and photic stimulation were not performed.     EKG recording shows a regular rhythm.    There is no push button or clinical event.    IMPRESSION:  Abnormal study due to moderate to diffuse background slowing consistent with diffuse cerebral dysfunction and encephalopathy which may be on the basis of toxic, metabolic, or primary neuronal disorder.       Gurmeet Rouse MD

## 2023-09-01 NOTE — SUBJECTIVE & OBJECTIVE
Past Medical History:   Diagnosis Date    Acute cystitis without hematuria 2/27/2020    Acute hypoxemic respiratory failure 4/11/2018    Acute respiratory failure with hypoxia 3/2/2020    KERMIT (acute kidney injury) 3/13/2019    Altered mental status     Anemia     Arthritis     Bilateral hand pain 3/14/2018    Branch retinal vein occlusion, left eye 2/20/2015    Chronic bilateral low back pain without sciatica 3/23/2017    Chronic renal failure in pediatric patient, stage 3 (moderate) 4/15/2018    Chronic renal failure syndrome, stage 3 (moderate) 4/15/2018    Chronic systolic (congestive) heart failure 8/6/2021    Last Assessment & Plan:  Formatting of this note might be different from the original. 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 Hospitalization late 2021, w/ rehab to follow (no notes available) --12/13/2021-CT head revealed thin extra-axial hyperdense collection overlying the right cerebral convexity compatible with acute subdural hematoma, neurosurgery was consulted, patient was noted with Keppra 500 mg b.i.d., apixaban was held -12/19/    Iron deficiency anemia secondary to inadequate dietary iron intake 6/24/2013    Joint pain      Keratoconjunctivitis sicca of both eyes not specified as Sjogren's 7/29/2016    Leiomyoma of uterus, unspecified 9/16/2014    Long QT interval 6/29/2016    Due to medication (plaquenil)     Macular edema 1/12/2015    Multinodular goiter 1/12/2016    Neuropathy 8/23/2017    Plaquenil causing adverse effect in therapeutic use 10/7/2016    Pneumococcal vaccine refused 8/17/2016    Pneumonia due to Streptococcus pneumoniae 4/5/2018    Poor nutrition 12/23/2018    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/2015    RA (rheumatoid arthritis) 12/13/2021    -managed by rhematology, since this is a duplicate problem list entry, will archive this     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       Past Surgical History:   Procedure Laterality Date    BREAST CYST EXCISION      CATARACT EXTRACTION      COLONOSCOPY N/A 9/29/2015    Procedure: COLONOSCOPY;  Surgeon: FIDELINA Sanchez MD;  Location: [facility] ENDO ([address] [place] [address]);  Service: Endoscopy;  Laterality: N/A;    EYE SURGERY      INJECTION OF ANESTHETIC AGENT AROUND NERVE Left 4/19/2021    Procedure: BLOCK, NERVE, FEMORAL AND OBTURATOR;  Surgeon: Shivam Gonzalez MD;  [facility];  Service: Pain Management;  Laterality: Left;  consent needed    JOINT REPLACEMENT      right knee    KNEE SURGERY Left 12/31/2013    TKR    left parotidectomy      mixed tumor    FL EVAL,SWALLOW FUNCTION,CINE/VIDEO RECORD  6/5/2021         SALIVARY GLAND SURGERY      TONSILLECTOMY      UPPER GASTROINTESTINAL ENDOSCOPY         Review of patient's allergies indicates:  No Known Allergies    No current facility-administered medications on file  prior to encounter.     Current Outpatient Medications on File Prior to Encounter   Medication Sig    albuterol-ipratropium (DUO-NEB) 2.5 mg-0.5 mg/3 mL nebulizer solution Take 3 mLs by nebulization 2 (two) times daily as needed for Shortness of Breath. Rescue    apixaban (ELIQUIS) 2.5 mg Tab Take 1 tablet (2.5 mg total) by mouth 2 (two) times daily.    atorvastatin (LIPITOR) 40 MG tablet Take 1 tablet (40 mg total) by mouth every evening.    baclofen (LIORESAL) 10 MG tablet Take 0.5 tablets (5 mg total) by mouth with lunch AND 0.5 tablets (5 mg total) daily with dinner or evening meal AND 1 tablet (10 mg total) every evening.    cholecalciferol, vitamin D3, 125 mcg (5,000 unit) capsule Take 1 capsule (5,000 Units total) by mouth once daily.    ferrous sulfate, dried (SLOW FE) 160 mg (50 mg iron) TbSR Take 1 tablet (160 mg total) by mouth every other day.    fluticasone-salmeterol diskus inhaler 100-50 mcg Inhale 1 puff into the lungs 2 (two) times daily. Controller    gabapentin (NEURONTIN) 100 MG capsule Take 1 capsule (100 mg total) by mouth 2 (two) times daily.    gabapentin 5% baclofen 2% amitriptyline 2% topical cream Apply topically 3 (three) times daily.    hydrOXYchloroQUINE (PLAQUENIL) 200 mg tablet Take 1 tablet (200 mg total) by mouth 2 (two) times daily.    LIDOcaine (LIDODERM) 5 % Place 1 patch onto the skin once daily. Remove & Discard patch within 12 hours as needed for pain or as directed by MD Place patch onto lower back as needed    magnesium oxide (MAG-OX) 400 mg (241.3 mg magnesium) tablet Take 400 mg by mouth once daily.    mycophenolate (CELLCEPT) 500 mg Tab Take 1 tablet (500 mg total) by mouth 2 (two) times daily.    pantoprazole (PROTONIX) 40 MG tablet Take 1 tablet (40 mg total) by mouth once daily.    predniSONE (DELTASONE) 10 MG tablet Take 1 tablet (10 mg total) by mouth once daily.    thiamine 100 MG tablet Take 100 mg by mouth once daily.    gabapentin (NEURONTIN) 300 MG capsule Take  1 capsule (300 mg total) by mouth every evening.    QUEtiapine (SEROQUEL) 25 MG Tab Take 1 tablet (25 mg total) by mouth nightly as needed (insomnia). (Patient not taking: Reported on 8/31/2023)    sulfamethoxazole-trimethoprim 800-160mg (BACTRIM DS) 800-160 mg Tab One tablet by mouth every Monday, Wednesday, Friday to prevent lung infection    [DISCONTINUED] acetaminophen (TYLENOL) 500 MG tablet Take 2 tablets (1,000 mg total) by mouth 2 (two) times daily as needed (Headache).    [DISCONTINUED] calcium carbonate (TUMS) 200 mg calcium (500 mg) chewable tablet Take 2 tablets (1,000 mg total) by mouth once daily. (Patient not taking: No sig reported)    [DISCONTINUED] famotidine (PEPCID) 20 MG tablet Take 1 tablet (20 mg total) by mouth 2 (two) times daily. (Patient not taking: Reported on 6/15/2022)    [DISCONTINUED] lacosamide (VIMPAT) 10 mg/mL Soln oral solution Take 10 mLs (100 mg total) by mouth every 12 (twelve) hours. (Patient not taking: No sig reported)    [DISCONTINUED] traZODone (DESYREL) 50 MG tablet Take 1 tablet (50 mg total) by mouth nightly as needed for Insomnia.     Family History       Problem Relation (Age of Onset)    Breast cancer Maternal Grandmother    Cancer Father    Heart disease Mother    Hypertension Mother    Hyperthyroidism Mother, Other    Prostate cancer Father          Tobacco Use    Smoking status: Never     Passive exposure: Never    Smokeless tobacco: Never   Substance and Sexual Activity    Alcohol use: Yes     Alcohol/week: 0.0 standard drinks of alcohol     Comment: very seldom     Drug use: No    Sexual activity: Not Currently     Comment: ,  age 52,      Review of Systems   Unable to perform ROS: Dementia     Objective:     Vital Signs (Most Recent):  Temp: 98.4 °F (36.9 °C) (08/31/23 2155)  Pulse: 82 (08/31/23 2309)  Resp: 18 (08/31/23 2155)  BP: 137/62 (08/31/23 2155)  SpO2: 97 % (08/31/23 2155) Vital Signs (24h Range):  Temp:  [98.3 °F (36.8 °C)-98.5 °F (36.9 °C)]  98.4 °F (36.9 °C)  Pulse:  [76-95] 82  Resp:  [14-26] 18  SpO2:  [92 %-98 %] 97 %  BP: ()/(41-74) 137/62     Weight: 64.2 kg (141 lb 8.6 oz)  Body mass index is 24.29 kg/m².     Physical Exam  Vitals and nursing note reviewed.   Constitutional:       General: She is not in acute distress.     Appearance: Normal appearance. She is not ill-appearing or toxic-appearing.      Comments: sleepy   HENT:      Head: Normocephalic and atraumatic.      Mouth/Throat:      Mouth: Mucous membranes are moist.   Eyes:      General: No scleral icterus.  Cardiovascular:      Rate and Rhythm: Normal rate. Rhythm irregular.      Pulses: Normal pulses.      Heart sounds: Normal heart sounds.   Pulmonary:      Effort: Pulmonary effort is normal. No respiratory distress.      Breath sounds: Normal breath sounds.   Abdominal:      General: There is no distension.      Palpations: Abdomen is soft. There is no mass.      Tenderness: There is no abdominal tenderness. There is no right CVA tenderness or left CVA tenderness.   Musculoskeletal:      Cervical back: No rigidity.      Right lower leg: No edema.      Left lower leg: No edema.   Lymphadenopathy:      Cervical: No cervical adenopathy.   Skin:     General: Skin is warm and dry.      Capillary Refill: Capillary refill takes less than 2 seconds.      Coloration: Skin is not jaundiced.   Neurological:      General: No focal deficit present.      Mental Status: She is alert and oriented to person, place, and time.                Significant Labs: All pertinent labs within the past 24 hours have been reviewed.    Significant Imaging: I have reviewed all pertinent imaging results/findings within the past 24 hours.

## 2023-09-01 NOTE — PHARMACY MED REC
"Admission Medication History     The home medication history was taken by Lakeshia Russell.    You may go to "Admission" then "Reconcile Home Medications" tabs to review and/or act upon these items.     The home medication list has been updated by the Pharmacy department.   Please read ALL comments highlighted in yellow.   Please address this information as you see fit.    Feel free to contact us if you have any questions or require assistance.      The medications listed below were removed from the home medication list. Please reorder if appropriate:  Patient reports no longer taking the following medication(s):  ACETAMINOPHEN 500 MG TABLET  CALCIUM CARBONATE 500 MG CHEWABLE TABLET  FAMOTIDINE 20 MG TABLET  LACOSAMIDE 10 MG/ML SOLUTION  TRAZODONE 50 MG TABLET    Medications listed below were obtained from: Patient/family and Analytic software- [employer] Medications   Medication Sig    albuterol-ipratropium (DUO-NEB) 2.5 mg-0.5 mg/3 mL nebulizer solution   Take 3 mLs by nebulization 2 (two) times daily as needed for Shortness of Breath. Rescue    apixaban (ELIQUIS) 2.5 mg Tab   Take 1 tablet (2.5 mg total) by mouth 2 (two) times daily.    atorvastatin (LIPITOR) 40 MG tablet   Take 1 tablet (40 mg total) by mouth every evening.    baclofen (LIORESAL) 10 MG tablet       Take 0.5 tablets (5 mg total) by mouth with lunch AND 0.5 tablets (5 mg total) daily with dinner or evening meal AND 1 tablet (10 mg total) every evening.    cholecalciferol, vitamin D3, 125 mcg (5,000 unit) capsule   Take 1 capsule (5,000 Units total) by mouth once daily.    ferrous sulfate, dried (SLOW FE) 160 mg (50 mg iron) TbSR   Take 1 tablet (160 mg total) by mouth every other day.    fluticasone-salmeterol diskus inhaler 100-50 mcg   Inhale 1 puff into the lungs 2 (two) times daily. Controller    gabapentin (NEURONTIN) 100 MG capsule   Take 1 capsule (100 mg total) by mouth 2 (two) times daily.    gabapentin 5% baclofen 2% amitriptyline 2% " topical cream   Apply topically 3 (three) times daily.    hydrOXYchloroQUINE (PLAQUENIL) 200 mg tablet   Take 1 tablet (200 mg total) by mouth 2 (two) times daily.    LIDOcaine (LIDODERM) 5 %         Place 1 patch onto the skin once daily. Remove & Discard patch within 12 hours as needed for pain or as directed by MD Place patch onto lower back as needed    magnesium oxide (MAG-OX) 400 mg (241.3 mg magnesium) tablet   Take 400 mg by mouth once daily.    mycophenolate (CELLCEPT) 500 mg Tab   Take 1 tablet (500 mg total) by mouth 2 (two) times daily.    pantoprazole (PROTONIX) 40 MG tablet   Take 1 tablet (40 mg total) by mouth once daily.    predniSONE (DELTASONE) 10 MG tablet   Take 1 tablet (10 mg total) by mouth once daily.    thiamine 100 MG tablet   Take 100 mg by mouth once daily.    gabapentin (NEURONTIN) 300 MG capsule   Take 1 capsule (300 mg total) by mouth every evening.    QUEtiapine (SEROQUEL) 25 MG Tab     Take 1 tablet (25 mg total) by mouth nightly as needed (insomnia).   (Patient not taking: Reported on 8/31/2023) NEW START      sulfamethoxazole-trimethoprim 800-160mg (BACTRIM DS) 800-160 mg Tab   One tablet by mouth every Monday, Wednesday, Friday to prevent lung infection       Potential issues to be addressed PRIOR TO DISCHARGE  Please discuss with the patient barriers to adherence with medication treatment plans  Patient requires education regarding drug therapies     Lakeshia Russell  EXT [id number]                  .

## 2023-09-01 NOTE — PLAN OF CARE
Problem: Infection  Goal: Absence of Infection Signs and Symptoms  Outcome: Ongoing, Progressing     Problem: Adult Inpatient Plan of Care  Goal: Plan of Care Review  Outcome: Ongoing, Progressing  Goal: Patient-Specific Goal (Individualized)  Outcome: Ongoing, Progressing  Goal: Absence of Hospital-Acquired Illness or Injury  Outcome: Ongoing, Progressing  Goal: Optimal Comfort and Wellbeing  Outcome: Ongoing, Progressing  Goal: Readiness for Transition of Care  Outcome: Ongoing, Progressing     Problem: Adjustment to Illness (Delirium)  Goal: Optimal Coping  Outcome: Ongoing, Progressing     Problem: Altered Behavior (Delirium)  Goal: Improved Behavioral Control  Outcome: Ongoing, Progressing     Problem: Attention and Thought Clarity Impairment (Delirium)  Goal: Improved Attention and Thought Clarity  Outcome: Ongoing, Progressing     Problem: Sleep Disturbance (Delirium)  Goal: Improved Sleep  Outcome: Ongoing, Progressing     Problem: Fluid and Electrolyte Imbalance (Acute Kidney Injury/Impairment)  Goal: Fluid and Electrolyte Balance  Outcome: Ongoing, Progressing     Problem: Oral Intake Inadequate (Acute Kidney Injury/Impairment)  Goal: Optimal Nutrition Intake  Outcome: Ongoing, Progressing     Problem: Renal Function Impairment (Acute Kidney Injury/Impairment)  Goal: Effective Renal Function  Outcome: Ongoing, Progressing     Problem: Impaired Wound Healing  Goal: Optimal Wound Healing  Outcome: Ongoing, Progressing     Problem: Fall Injury Risk  Goal: Absence of Fall and Fall-Related Injury  Outcome: Ongoing, Progressing     Problem: Skin Injury Risk Increased  Goal: Skin Health and Integrity  Outcome: Ongoing, Progressing     Problem: Cognitive Impairment (Dementia Signs/Symptoms)  Goal: Optimized Cognitive Function (Dementia Signs/Symptoms)  Outcome: Ongoing, Progressing     Problem: Sleep Disturbance (Dementia Signs/Symptoms)  Goal: Improved Sleep (Dementia Signs/Symptoms)  Outcome: Ongoing,  Progressing     Problem: COPD (Chronic Obstructive Pulmonary Disease) Comorbidity  Goal: Maintenance of COPD Symptom Control  Outcome: Ongoing, Progressing     Problem: Heart Failure Comorbidity  Goal: Maintenance of Heart Failure Symptom Control  Outcome: Ongoing, Progressing     Problem: Hypertension Comorbidity  Goal: Blood Pressure in Desired Range  Outcome: Ongoing, Progressing     Pt progressing towards goals. No distress notice. No falls or injuries during shift. Bed in lowest position, call light within reach, belonging at bedside. Safety precaution maintain.Plan of Care reviewed. Pt needs family member to be present. Will say she understated but not understanding totally.

## 2023-09-01 NOTE — ASSESSMENT & PLAN NOTE
Able to answer questions. Appears to be relatively somnolent but rouses to questions. She is accompanied by her daughter at bedside who explains that over the last 2 weeks approximately, she has been having disturbed sleep wake cycle and worsening delirium/delusions episodes. The patient's neurologist trialed several medications which were unhelpful. Seroquel produced profound sedation but unclear if it was because of inability to sleep for several days prior.

## 2023-09-02 PROBLEM — E78.5 HYPERLIPIDEMIA: Chronic | Status: RESOLVED | Noted: 2019-10-17 | Resolved: 2023-01-01

## 2023-09-02 PROBLEM — I27.23 PULMONARY HYPERTENSION DUE TO INTERSTITIAL LUNG DISEASE: Chronic | Status: RESOLVED | Noted: 2019-03-14 | Resolved: 2023-01-01

## 2023-09-02 PROBLEM — R53.1 GENERALIZED WEAKNESS: Status: ACTIVE | Noted: 2021-12-30

## 2023-09-02 PROBLEM — R53.1 GENERALIZED WEAKNESS: Status: RESOLVED | Noted: 2017-11-08 | Resolved: 2023-01-01

## 2023-09-02 PROBLEM — J84.9 PULMONARY HYPERTENSION DUE TO INTERSTITIAL LUNG DISEASE: Chronic | Status: RESOLVED | Noted: 2019-03-14 | Resolved: 2023-01-01

## 2023-09-02 NOTE — NURSING
Pt is resting in bed,more alert now,eating her supper.Family at the bedside. In stable condition. Bed in lowest position call light within reach. wctm

## 2023-09-02 NOTE — NURSING
Pt rested quietly last night after the Seroquel administered.very relaxed.foam dressing applied to skin tear to left buttock and also to small fluid filled blister to left groin region.barrier cream applied to heels,slight redness noted.floated off of a pillow.    Spoke with patient regarding results. Do we have any infusions here at [facility]?

## 2023-09-02 NOTE — PLAN OF CARE
Problem: Infection  Goal: Absence of Infection Signs and Symptoms  Outcome: Ongoing, Progressing     Problem: Adult Inpatient Plan of Care  Goal: Plan of Care Review  Outcome: Ongoing, Progressing  Goal: Patient-Specific Goal (Individualized)  Outcome: Ongoing, Progressing  Goal: Absence of Hospital-Acquired Illness or Injury  Outcome: Ongoing, Progressing  Goal: Optimal Comfort and Wellbeing  Outcome: Ongoing, Progressing  Goal: Readiness for Transition of Care  Outcome: Ongoing, Progressing     Problem: Adjustment to Illness (Delirium)  Goal: Optimal Coping  Outcome: Ongoing, Progressing     Problem: Altered Behavior (Delirium)  Goal: Improved Behavioral Control  Outcome: Ongoing, Progressing     Problem: Attention and Thought Clarity Impairment (Delirium)  Goal: Improved Attention and Thought Clarity  Outcome: Ongoing, Progressing     Problem: Sleep Disturbance (Delirium)  Goal: Improved Sleep  Outcome: Ongoing, Progressing     Problem: Fluid and Electrolyte Imbalance (Acute Kidney Injury/Impairment)  Goal: Fluid and Electrolyte Balance  Outcome: Ongoing, Progressing     Problem: Oral Intake Inadequate (Acute Kidney Injury/Impairment)  Goal: Optimal Nutrition Intake  Outcome: Ongoing, Progressing     Problem: Renal Function Impairment (Acute Kidney Injury/Impairment)  Goal: Effective Renal Function  Outcome: Ongoing, Progressing     Problem: Impaired Wound Healing  Goal: Optimal Wound Healing  Outcome: Ongoing, Progressing     Problem: Fall Injury Risk  Goal: Absence of Fall and Fall-Related Injury  Outcome: Ongoing, Progressing     Problem: Skin Injury Risk Increased  Goal: Skin Health and Integrity  Outcome: Ongoing, Progressing     Problem: Cognitive Impairment (Dementia Signs/Symptoms)  Goal: Optimized Cognitive Function (Dementia Signs/Symptoms)  Outcome: Ongoing, Progressing     Problem: Sleep Disturbance (Dementia Signs/Symptoms)  Goal: Improved Sleep (Dementia Signs/Symptoms)  Outcome: Ongoing,  Progressing     Problem: COPD (Chronic Obstructive Pulmonary Disease) Comorbidity  Goal: Maintenance of COPD Symptom Control  Outcome: Ongoing, Progressing     Problem: Heart Failure Comorbidity  Goal: Maintenance of Heart Failure Symptom Control  Outcome: Ongoing, Progressing     Problem: Hypertension Comorbidity  Goal: Blood Pressure in Desired Range  Outcome: Ongoing, Progressing     Problem: Dysrhythmia  Goal: Normalized Cardiac Rhythm  Outcome: Ongoing, Progressing

## 2023-09-02 NOTE — HOSPITAL COURSE
Extended EEGs showed nonspecific generalized slowing but no acute epileptiform activity. Steroids were restarted and escalated to stress dosing with marked improvement in somnolence. Creatinine bumped noted suspected to be 2/2 dehydration which improved w/ IVFs. Renal US showing medical renal dx. RN reported possible stone visualization on urinary excretion on 9/4/23; CT stone protocol negative for hydronephrosis or any obstructive nephrolithiasis. Pt overall sleep wake cycle improved w/ stress dosing of steroids and seroquel hs. Pt continued to do well into 9/7. She was denied by multiple SNFs, so family opted to go home with HH.   Update: family appealing discharge, stating they need SNF and cannot accommodate at home with caregivers on the weekend. SW/CM working on SNF placement.    Pt accepted to SNF on 9/13 and was subsequently discharged.

## 2023-09-02 NOTE — ASSESSMENT & PLAN NOTE
MRI and CT which were both negative. Associated w/ worsening confusion at home, sleep disruption, and inability to feed herself; acute onset in 1 week  - KERMIT resolved; EEG neg for acute seizures at this time;   - neurology consulted  - checking vit B 12; so far urine and blood cx's unremarkable  - fall precautions; pt/ot  -Diagnosis of exclusion would be worsening dementia/delirium

## 2023-09-02 NOTE — PT/OT/SLP PROGRESS
Occupational Therapy      Patient Name:  Selma Lux   MRN:  [id number]    Patient not seen today secondary to inability to follow 1 step commands or open eyes, inappropriate this date. Will follow-up 9/3.    9/2/2023

## 2023-09-02 NOTE — SUBJECTIVE & OBJECTIVE
Interval History:  No acute issues noted overnight.  Patient herself denies any mik pains.  So far blood cultures only seemed to be positive 1 of 2 of staph epidermidis suggesting contaminants.    Review of Systems  Objective:     Vital Signs (Most Recent):  Temp: 98.5 °F (36.9 °C) (09/02/23 1154)  Pulse: 74 (09/02/23 1154)  Resp: 17 (09/02/23 1154)  BP: (!) 117/51 (09/02/23 1154)  SpO2: (!) 93 % (09/02/23 1154) Vital Signs (24h Range):  Temp:  [97.6 °F (36.4 °C)-98.5 °F (36.9 °C)] 98.5 °F (36.9 °C)  Pulse:  [] 74  Resp:  [17-19] 17  SpO2:  [93 %-98 %] 93 %  BP: (112-144)/(51-76) 117/51     Weight: 64.2 kg (141 lb 8.6 oz)  Body mass index is 24.29 kg/m².    Intake/Output Summary (Last 24 hours) at 9/2/2023 1406  Last data filed at 9/2/2023 0639  Gross per 24 hour   Intake 210 ml   Output 1150 ml   Net -940 ml         Physical Exam      Clear lungs bilaterally, unlabored breathing, on room air, no cyanosis   Heart sounds indicate a regular rate and rhythm   Awake alert, no acute distress   Eyes closed; pupils equal when examined  No LE edema

## 2023-09-02 NOTE — PROCEDURES
EEG REPORT      Selma Lux  [id number]  1935    DATE OF SERVICE: 9/2/2023     -1,2    METHODOLOGY      Extended electroencephalographic recording is made while the patient is ambulatory and continuing normal daily activities.  Electrodes are placed according to the International 10-20 placement system and included T1 and T2 electrode placement.  Twenty four (24) channels of digital signal (sampling rate of 512/sec) was simultaneously recorded from the scalp including EKG and eye monitors.  Recording band pass was 0.1 to 100 hz and all data was stored digitally on the recorder.  The patient is instructed to press an event button when clinical symptoms occur and write the symptoms into a diary. Activation procedures which include photic stimulation, hyperventilation and instructing patients to perform simple task are done in selected patients.        The EEG is displayed on a monitor screen and can be reformatted into different montages for evaluation.  The entire recoding is submitted for computer assisted analysis to detect spike and electrographic seizure activity.  The entire recording is visually reviewed and the times identified by computer analysis as being spikes or seizures are reviewed again.  Compresses spectral analysis (CSA) is also performed on the activity recorded from each individual channel.  This is displayed as a power display of frequencies from 0 to 30 Hz over time.   The CSA analysis is done and displayed continuously.  This is reviewed for asymmetries in power between homologous areas of the scalp and for presence of changes in power which canbe seen when seizures occur.  Sections of suspected abnormalities on the CSA is then compared with the original EEG recording.   Autogrid software was also utilized in the review of this study.  This software suite analyzes the EEG recording in multiple domains.  Coherence and rhythmicity is computed to identify EEG sections which may  contain organized seizures.  Each channel undergoes analysis to detect presence of spike and sharp waves which have special and morphological characteristic of epileptic activity.  The routine EEG recording is converted from spacial into frequency domain.  This is then displayed comparing homologous areas to identify areas of significant asymmetry.  Algorithm to identify non-cortically generated artifact is used to separate eye movement, EMG and other artifact from the EEG     Recording Times  Start on 9/1/2023  Stop on 9/2/2023    A total of 17:10:49 and 3:48:37 hours of EEG was recorded.      EEG FINDINGS:  Background activity:   The background rhythm was characterized by poorly organized alpha and theta activity with absent posterior dominant alpha rhythm at 30-70 microvolts.   Symmetry and continuity: the background was continuous and symmetric     Sleep:   No sleep transients although there is cycling of the background    Activation procedures:   NA    Abnormal activity:   No epileptiform discharges, periodic discharges, lateralized rhythmic delta activity or electrographic seizures were seen.    IMPRESSION:   Abnormal EEG due to mild to moderate generalized non-specific cerebral dysfunction with no electrographic seizures or indications of seizure tendency.      Michael Garcia MD  Neurology-Epilepsy.  [facility].

## 2023-09-02 NOTE — ASSESSMENT & PLAN NOTE
Able to answer questions. Appears to be relatively somnolent but rouses to questions. She is accompanied by her daughter at bedside who explains that over the last 2 weeks approximately, she has been having disturbed sleep wake cycle and worsening delirium/delusions episodes.   Doing better with 12.5 mg of seroquel at night

## 2023-09-02 NOTE — PROGRESS NOTES
[facility] [address] [place] [address] Medicine  Progress Note    Patient Name: Selma Lux  MRN: [id number]  Patient Class: IP- Inpatient   Admission Date: 8/31/2023  Length of Stay: 1 days  Attending Physician: Prashanth Pena MD  Primary Care Provider: Génesis Rosario MD        Subjective:     Principal Problem:Generalized weakness        HPI:  Patient is a 86 yo F with PMH of vascular dementia, subdural hematoma, adrenal insufficiency, hypertension, COPD, RA with ILD on hydroxychloroquine and mycophenolate, pAF who is presenting with an acute on chronic mental status change. Patient is accompanied by her daughter who provides much of the history although the patient is able to answer questions about ROS.     In the ED,  Patient hypotensive on admit but rapidly corrected without fluid however patient did eventually receive a L of LR and pip/tazo plus vanc for empiric sepsis. Blood cultures drawn and pending. Lab w/u performed in ED revealed stable anemia without leukocytosis. CXR clear. UA not c/w with UTI. MRI and CT head unrevealing for any acute change. Patient did have what appears to be an KERMIT on her CMP although her creatinine has been fluctuating at a higher level than prior to may 2023.       Overview/Hospital Course:  Extended EEGs showed nonspecific generalized slowing but no acute epileptiform activity.      Interval History:  No acute issues noted overnight.  Patient herself denies any mik pains.  So far blood cultures only seemed to be positive 1 of 2 of staph epidermidis suggesting contaminants.    Review of Systems  Objective:     Vital Signs (Most Recent):  Temp: 98.5 °F (36.9 °C) (09/02/23 1154)  Pulse: 74 (09/02/23 1154)  Resp: 17 (09/02/23 1154)  BP: (!) 117/51 (09/02/23 1154)  SpO2: (!) 93 % (09/02/23 1154) Vital Signs (24h Range):  Temp:  [97.6 °F (36.4 °C)-98.5 °F (36.9 °C)] 98.5 °F (36.9 °C)  Pulse:  [] 74  Resp:  [17-19] 17  SpO2:  [93 %-98 %] 93 %  BP:  (112-144)/(51-76) 117/51     Weight: 64.2 kg (141 lb 8.6 oz)  Body mass index is 24.29 kg/m².    Intake/Output Summary (Last 24 hours) at 9/2/2023 1406  Last data filed at 9/2/2023 0639  Gross per 24 hour   Intake 210 ml   Output 1150 ml   Net -940 ml         Physical Exam      Clear lungs bilaterally, unlabored breathing, on room air, no cyanosis   Heart sounds indicate a regular rate and rhythm   Awake alert, no acute distress   Eyes closed; pupils equal when examined  No LE edema      Assessment/Plan:      * Generalized weakness   MRI and CT which were both negative. Associated w/ worsening confusion at home, sleep disruption, and inability to feed herself; acute onset in 1 week  - KERMIT resolved; EEG neg for acute seizures at this time;   - neurology consulted  - checking vit B 12; so far urine and blood cx's unremarkable  - fall precautions; pt/ot  -Diagnosis of exclusion would be worsening dementia/delirium    Heart failure with preserved ejection fraction  Appears stable clinically.     Most recent echo:   The left ventricle is normal in size with concentric hypertrophy and normal systolic function. The estimated ejection fraction is 65%.   Normal right ventricular size with normal right ventricular systolic function.   Grade II left ventricular diastolic dysfunction.   Severe left atrial enlargement.   Mild-to-moderate mitral regurgitation.   The estimated PA systolic pressure is 43 mmHg.   Normal central venous pressure (3 mmHg).               Plan:  - continue to monitor  - euvolemic  - Avoid salt intake > 2 g/day  - magnesium > 2, potassium > 4  - cardiac diet      Vascular Dementia  Able to answer questions. Appears to be relatively somnolent but rouses to questions. She is accompanied by her daughter at bedside who explains that over the last 2 weeks approximately, she has been having disturbed sleep wake cycle and worsening delirium/delusions episodes.   Doing better with 12.5 mg of seroquel at  night    COPD (chronic obstructive pulmonary disease)  Patient VBG on admission appears compatible with stable, compensated lung disease. Her home inhaler is not available on formulary however I have ordered an equivalent.    Plan:  - Continue to monitor    KERMIT (acute kidney injury)  resolved      Thrombocytopenia  Appears to be stable. Per PCP Dr. Lee, they were monitoring with periodic CBCs      AF (paroxysmal atrial fibrillation)  Patient with Long standing persistent (>12 months) atrial fibrillation which is uncontrolled currently with previous BB but taken off due to concern for AEs. Patient is currently in sinus rhythm.[id number] Score: 6. Anticoagulation indicated. Anticoagulation done with apixaban 2.5 BID.    Rheumatoid arthritis of multiple sites  Takes mycophenolate and hydroxychloroquine at home for ILD and connective tissue manifestatons of RA. Appears to be well controlled without boggy or tender joints on exam and stable pulmonary findings without the need for O2.    Plan:  - Continue HCQ and myco        VTE Risk Mitigation (From admission, onward)         Ordered     apixaban tablet 2.5 mg  2 times daily         08/31/23 2153     IP VTE HIGH RISK PATIENT  Once         08/31/23 2153     Place sequential compression device  Until discontinued         08/31/23 2153                Discharge Planning   LETICIA: 9/2/2023     Code Status: Full Code   Is the patient medically ready for discharge?:     Reason for patient still in hospital (select all that apply): Patient trending condition, Laboratory test, Treatment, Consult recommendations and PT / OT recommendations  Discharge Plan A: Home with family                  Prashanth Pena MD  Department of Hospital Medicine   [facility]

## 2023-09-02 NOTE — PLAN OF CARE
Problem: Infection  Goal: Absence of Infection Signs and Symptoms  Outcome: Ongoing, Progressing     Problem: Adult Inpatient Plan of Care  Goal: Plan of Care Review  Outcome: Ongoing, Progressing  Goal: Patient-Specific Goal (Individualized)  Outcome: Ongoing, Progressing  Goal: Absence of Hospital-Acquired Illness or Injury  Outcome: Ongoing, Progressing  Goal: Optimal Comfort and Wellbeing  Outcome: Ongoing, Progressing  Goal: Readiness for Transition of Care  Outcome: Ongoing, Progressing     Problem: Adjustment to Illness (Delirium)  Goal: Optimal Coping  Outcome: Ongoing, Progressing     Problem: Altered Behavior (Delirium)  Goal: Improved Behavioral Control  Outcome: Ongoing, Progressing     Problem: Attention and Thought Clarity Impairment (Delirium)  Goal: Improved Attention and Thought Clarity  Outcome: Ongoing, Progressing     Problem: Sleep Disturbance (Delirium)  Goal: Improved Sleep  Outcome: Ongoing, Progressing     Problem: Fluid and Electrolyte Imbalance (Acute Kidney Injury/Impairment)  Goal: Fluid and Electrolyte Balance  Outcome: Ongoing, Progressing     Problem: Oral Intake Inadequate (Acute Kidney Injury/Impairment)  Goal: Optimal Nutrition Intake  Outcome: Ongoing, Progressing     Problem: Renal Function Impairment (Acute Kidney Injury/Impairment)  Goal: Effective Renal Function  Outcome: Ongoing, Progressing     Problem: Impaired Wound Healing  Goal: Optimal Wound Healing  Outcome: Ongoing, Progressing     Problem: Fall Injury Risk  Goal: Absence of Fall and Fall-Related Injury  Outcome: Ongoing, Progressing     Problem: Skin Injury Risk Increased  Goal: Skin Health and Integrity  Outcome: Ongoing, Progressing     Problem: Cognitive Impairment (Dementia Signs/Symptoms)  Goal: Optimized Cognitive Function (Dementia Signs/Symptoms)  Outcome: Ongoing, Progressing     Problem: Sleep Disturbance (Dementia Signs/Symptoms)  Goal: Improved Sleep (Dementia Signs/Symptoms)  Outcome: Ongoing,  Progressing     Problem: COPD (Chronic Obstructive Pulmonary Disease) Comorbidity  Goal: Maintenance of COPD Symptom Control  Outcome: Ongoing, Progressing     Problem: Heart Failure Comorbidity  Goal: Maintenance of Heart Failure Symptom Control  Outcome: Ongoing, Progressing     Problem: Hypertension Comorbidity  Goal: Blood Pressure in Desired Range  Outcome: Ongoing, Progressing     Problem: Dysrhythmia  Goal: Normalized Cardiac Rhythm  Outcome: Ongoing, Progressing     Pt progressing towards goals.Telemetry maintained,NSR. Oriented to self,recognizes daughters.able to state year.following simple commands. Left sided weakness from a previous stroke.continues with right arm and right leg involuntary  jerking movements. EEG in progress. Pt constantly carrying on a conversation ,jumping from one topic to the next. After the Seroquel administered, pt did begin to relax.taking short naps and then awakens again,talking with periodic jerking movements of the right arm and right leg and then settles down again and drifts back off to sleep.does not to be covered.will constantly pull her sheet and blankets off. Ate a good dinner.purewick maintained,has voided 1000ml of jacqueline urine tonight.smallskin tear noted to left buttock and a small fluid filled blister noted to left groin region.safety precautions maintained.bed in low position.rails up x4.call bell in reach.frequent rounds being made.charting outside of pts door for frequent visualization.pt free of falls or injuries.Fall Risk Bracelet maintained.continue plan of care.

## 2023-09-03 NOTE — NURSING
Pt has been very sleepy all day today.responds to voice.speaks with eyes closed.oriented to person,year,family.follows commands.rr 28/min.o2 sat 96% on r/a.bp 107/51,hr 102bpm,T99.0.Sophie NP notified.will obtain a Lactic Acid Level. all sedatives were held tonight(Neurontin,Seroquel)the patient also had a large loose stool tonight.Mirilax and senna held.

## 2023-09-03 NOTE — ASSESSMENT & PLAN NOTE
Patient with Long standing persistent (>12 months) atrial fibrillation which is uncontrolled currently with previous BB but taken off due to concern for AEs. Patient is currently in sinus rhythm.[id number] Score: 6. Anticoagulation indicated. Anticoagulation done with apixaban 2.5 BID.

## 2023-09-03 NOTE — SUBJECTIVE & OBJECTIVE
Past Medical History:   Diagnosis Date    Acute cystitis without hematuria 2/27/2020    Acute hypoxemic respiratory failure 4/11/2018    Acute respiratory failure with hypoxia 3/2/2020    KERMIT (acute kidney injury) 3/13/2019    Altered mental status     Anemia     Arthritis     Bilateral hand pain 3/14/2018    Branch retinal vein occlusion, left eye 2/20/2015    Chronic bilateral low back pain without sciatica 3/23/2017    Chronic renal failure in pediatric patient, stage 3 (moderate) 4/15/2018    Chronic renal failure syndrome, stage 3 (moderate) 4/15/2018    Chronic systolic (congestive) heart failure 8/6/2021    Last Assessment & Plan:  Formatting of this note might be different from the original. 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 Hospitalization late 2021, w/ rehab to follow (no notes available) --12/13/2021-CT head revealed thin extra-axial hyperdense collection overlying the right cerebral convexity compatible with acute subdural hematoma, neurosurgery was consulted, patient was noted with Keppra 500 mg b.i.d., apixaban was held -12/19/    Iron deficiency anemia secondary to inadequate dietary iron intake 6/24/2013    Joint pain      Keratoconjunctivitis sicca of both eyes not specified as Sjogren's 7/29/2016    Leiomyoma of uterus, unspecified 9/16/2014    Long QT interval 6/29/2016    Due to medication (plaquenil)     Macular edema 1/12/2015    Multinodular goiter 1/12/2016    Neuropathy 8/23/2017    Plaquenil causing adverse effect in therapeutic use 10/7/2016    Pneumococcal vaccine refused 8/17/2016    Pneumonia due to Streptococcus pneumoniae 4/5/2018    Poor nutrition 12/23/2018    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/2015    RA (rheumatoid arthritis) 12/13/2021    -managed by rhematology, since this is a duplicate problem list entry, will archive this     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       Past Surgical History:   Procedure Laterality Date    BREAST CYST EXCISION      CATARACT EXTRACTION      COLONOSCOPY N/A 9/29/2015    Procedure: COLONOSCOPY;  Surgeon: FIDELINA Sanchez MD;  [facility]  Service: Endoscopy;  Laterality: N/A;    EYE SURGERY      INJECTION OF ANESTHETIC AGENT AROUND NERVE Left 4/19/2021    Procedure: BLOCK, NERVE, FEMORAL AND OBTURATOR;  Surgeon: Shivam Gonzalez MD;  Location: [facility] PAIN MGT;  Service: Pain Management;  Laterality: Left;  consent needed    JOINT REPLACEMENT      right knee    KNEE SURGERY Left 12/31/2013    TKR    left parotidectomy      mixed tumor    ME EVAL,SWALLOW FUNCTION,CINE/VIDEO RECORD  6/5/2021         SALIVARY GLAND SURGERY      TONSILLECTOMY      UPPER GASTROINTESTINAL ENDOSCOPY         Review of patient's allergies indicates:  No Known Allergies    Current Neurological Medications:     No current  facility-administered medications on file prior to encounter.     Current Outpatient Medications on File Prior to Encounter   Medication Sig    albuterol-ipratropium (DUO-NEB) 2.5 mg-0.5 mg/3 mL nebulizer solution Take 3 mLs by nebulization 2 (two) times daily as needed for Shortness of Breath. Rescue    apixaban (ELIQUIS) 2.5 mg Tab Take 1 tablet (2.5 mg total) by mouth 2 (two) times daily.    atorvastatin (LIPITOR) 40 MG tablet Take 1 tablet (40 mg total) by mouth every evening.    baclofen (LIORESAL) 10 MG tablet Take 0.5 tablets (5 mg total) by mouth with lunch AND 0.5 tablets (5 mg total) daily with dinner or evening meal AND 1 tablet (10 mg total) every evening.    cholecalciferol, vitamin D3, 125 mcg (5,000 unit) capsule Take 1 capsule (5,000 Units total) by mouth once daily.    ferrous sulfate, dried (SLOW FE) 160 mg (50 mg iron) TbSR Take 1 tablet (160 mg total) by mouth every other day.    fluticasone-salmeterol diskus inhaler 100-50 mcg Inhale 1 puff into the lungs 2 (two) times daily. Controller    gabapentin (NEURONTIN) 100 MG capsule Take 1 capsule (100 mg total) by mouth 2 (two) times daily.    gabapentin 5% baclofen 2% amitriptyline 2% topical cream Apply topically 3 (three) times daily.    hydrOXYchloroQUINE (PLAQUENIL) 200 mg tablet Take 1 tablet (200 mg total) by mouth 2 (two) times daily.    LIDOcaine (LIDODERM) 5 % Place 1 patch onto the skin once daily. Remove & Discard patch within 12 hours as needed for pain or as directed by MD Place patch onto lower back as needed    magnesium oxide (MAG-OX) 400 mg (241.3 mg magnesium) tablet Take 400 mg by mouth once daily.    mycophenolate (CELLCEPT) 500 mg Tab Take 1 tablet (500 mg total) by mouth 2 (two) times daily.    pantoprazole (PROTONIX) 40 MG tablet Take 1 tablet (40 mg total) by mouth once daily.    predniSONE (DELTASONE) 10 MG tablet Take 1 tablet (10 mg total) by mouth once daily.    thiamine 100 MG tablet Take 100 mg by mouth once daily.     gabapentin (NEURONTIN) 300 MG capsule Take 1 capsule (300 mg total) by mouth every evening.    QUEtiapine (SEROQUEL) 25 MG Tab Take 1 tablet (25 mg total) by mouth nightly as needed (insomnia). (Patient not taking: Reported on 8/31/2023)    sulfamethoxazole-trimethoprim 800-160mg (BACTRIM DS) 800-160 mg Tab One tablet by mouth every Monday, Wednesday, Friday to prevent lung infection    [DISCONTINUED] calcium carbonate (TUMS) 200 mg calcium (500 mg) chewable tablet Take 2 tablets (1,000 mg total) by mouth once daily. (Patient not taking: No sig reported)    [DISCONTINUED] famotidine (PEPCID) 20 MG tablet Take 1 tablet (20 mg total) by mouth 2 (two) times daily. (Patient not taking: Reported on 6/15/2022)    [DISCONTINUED] lacosamide (VIMPAT) 10 mg/mL Soln oral solution Take 10 mLs (100 mg total) by mouth every 12 (twelve) hours. (Patient not taking: No sig reported)     Family History       Problem Relation (Age of Onset)    Breast cancer Maternal Grandmother    Cancer Father    Heart disease Mother    Hypertension Mother    Hyperthyroidism Mother, Other    Prostate cancer Father          Tobacco Use    Smoking status: Never     Passive exposure: Never    Smokeless tobacco: Never   Substance and Sexual Activity    Alcohol use: Yes     Alcohol/week: 0.0 standard drinks of alcohol     Comment: very seldom     Drug use: No    Sexual activity: Not Currently     Comment: ,  age 52,      Review of Systems   Unable to perform ROS: Mental status change     Objective:     Vital Signs (Most Recent):  Temp: 98.7 °F (37.1 °C) (09/02/23 1928)  Pulse: 100 (09/02/23 1928)  Resp: (!) 24 (09/02/23 1928)  BP: (!) 106/54 (09/02/23 1928)  SpO2: (!) 94 % (09/02/23 1928) Vital Signs (24h Range):  Temp:  [97.6 °F (36.4 °C)-98.7 °F (37.1 °C)] 98.7 °F (37.1 °C)  Pulse:  [] 100  Resp:  [16-24] 24  SpO2:  [93 %-98 %] 94 %  BP: ()/(50-69) 106/54     Weight: 64.2 kg (141 lb 8.6 oz)  Body mass index is 24.29 kg/m².      Physical Exam  Vitals and nursing note reviewed.   Constitutional:       Appearance: Normal appearance.      Comments: Fluctuating exam. In the morning more arousable and able to stay up for longer  but then in the afternoon complaining  of abdominal pain and less interactive   HENT:      Head: Normocephalic and atraumatic.      Nose: Nose normal.      Mouth/Throat:      Mouth: Mucous membranes are moist.   Eyes:      Extraocular Movements: Extraocular movements intact.      Pupils: Pupils are equal, round, and reactive to light.   Cardiovascular:      Rate and Rhythm: Normal rate and regular rhythm.   Abdominal:      General: Bowel sounds are normal. There is no distension.      Palpations: Abdomen is soft.      Tenderness: There is no abdominal tenderness.   Musculoskeletal:         General: No swelling. Normal range of motion.      Cervical back: Normal range of motion.   Skin:     General: Skin is warm.      Capillary Refill: Capillary refill takes less than 2 seconds.   Neurological:      Mental Status: She is alert and oriented to person, place, and time.      Cranial Nerves: Cranial nerves 2-12 are intact. No cranial nerve deficit.      Sensory: Sensory deficit present.      Motor: Weakness present.      Deep Tendon Reflexes:      Reflex Scores:       Bicep reflexes are 2+ on the right side and 2+ on the left side.       Patellar reflexes are 2+ on the right side and 2+ on the left side.         NEUROLOGICAL EXAMINATION:     MENTAL STATUS   Oriented to person, place, and time.   Attention: decreased. Concentration: decreased.   Level of consciousness: drowsy, arousable by tactile stimuli ,  arousable by verbal stimuli       Drowsy but able  to  wake up; fluctuating throughout the day      CRANIAL NERVES   Cranial nerves II through XII intact.     CN III, IV, VI   Pupils are equal, round, and reactive to light.    MOTOR EXAM        Global generalized weakness, worst in lower  extremities  Able to light  antigravity bl upper extremities   Able to  move R leg,minimal movement on left (baseline)     REFLEXES     Reflexes   Right biceps: 2+  Left biceps: 2+  Right patellar: 2+  Left patellar: 2+    GAIT AND COORDINATION        Chorea  like movement on RUE       Significant Labs: CBC:   Recent Labs   Lab 09/01/23 0349 09/02/23  0540   WBC 7.23 6.58   HGB 9.2* 9.7*   HCT 30.6* 32.1*   * 96*     CMP:   Recent Labs   Lab 09/01/23 0349 09/02/23  0540   GLU 85 57*   * 134*   K 4.6 4.5    106   CO2 19* 20*   BUN 23 17   CREATININE 1.4 1.1   CALCIUM 8.9 8.6*   MG 2.0 1.7   PROT 5.9* 5.6*   ALBUMIN 3.4* 3.3*   BILITOT 1.8* 1.9*   ALKPHOS 49* 53*   AST 12 14   ALT 10 9*   ANIONGAP 10 8     All pertinent lab results from the past 24 hours have been reviewed.    Significant Imaging: I have reviewed and interpreted all pertinent imaging results/findings within the past 24 hours.

## 2023-09-03 NOTE — ASSESSMENT & PLAN NOTE
MRI and CT which were both negative. Associated w/ worsening confusion at home, sleep disruption, and inability to feed herself; acute onset in 1 week  - possible adrenal crisis at home?  given neg findings for organic workup otherwise  - KERMIT resolved; EEG neg for acute seizures at this time;   - neurology consulted - conservative measures; do not recommend LP- family also declines LP.  - stablevit B 12; so far urine and blood cx's unremarkable  - fall precautions; pt/ot  - Diagnosis of exclusion would be worsening dementia/delirium  - given home prednisone dose today and additionally will cover with stress dose steroids (solucortef)

## 2023-09-03 NOTE — ASSESSMENT & PLAN NOTE
88 y/o F w PMH of vascular dementia, subdural hematoma (w residual LSW), Afib (AC), R chorea movements, adrenal insufficiency, hypertension, COPD, RA with ILD on hydroxychloroquine and mycophenolate, admitted to hospital medicine for acute on chronic mental status change and neurology consulted. Patient experiencing some changes on wake and sleep cycle that are most likely contributing to current mental status change. She has extensive white matter disease on her brain MRI (chronic issue) that with any precipitating factor such as  dehydration and poor sleep can trigger this mental status change. Workup on this admission unrevealing and no evidence to suggest acute on going process.     Recommendations   -- ordered basic nutritional vitamin levels that were missing   -- no further diagnostics recommended and should  focus on bowel regimen and delirium precautions at this time   -- agree with lower dose of seroquel and try to keep patient awake during the day.

## 2023-09-03 NOTE — PT/OT/SLP PROGRESS
Physical Therapy      Patient Name:  Selma Lux   MRN:  [id number]    Patient not seen today secondary to pt daughter declining evaluation in AM regarding the attempt being too early and recent hypotension. Daughter requested PT/OT re-attempt at a later time. PT/OT returned at 1300, daughter declined again 2/2 pt receiving medication. Will follow-up as pt is medically appropriate.

## 2023-09-03 NOTE — SUBJECTIVE & OBJECTIVE
Interval History:  Patient noted to be somewhat tachypneic overnight.  Chest x-ray obtained this morning shows no acute infiltrates.  My evaluation patient is not in any acute distress but appears very sluggish/fatigued but protecting her airway and communicating.  No reported chest pain or shortness a breath.  Respirations have improved during the day shift.    Review of Systems  Objective:     Vital Signs (Most Recent):  Temp: 98 °F (36.7 °C) (09/03/23 1106)  Pulse: 88 (09/03/23 1545)  Resp: 18 (09/03/23 1545)  BP: (!) 102/54 (09/03/23 1106)  SpO2: (!) 93 % (09/03/23 1106) Vital Signs (24h Range):  Temp:  [97.6 °F (36.4 °C)-99 °F (37.2 °C)] 98 °F (36.7 °C)  Pulse:  [] 88  Resp:  [16-28] 18  SpO2:  [92 %-97 %] 93 %  BP: ()/(49-56) 102/54     Weight: 64.2 kg (141 lb 8.6 oz)  Body mass index is 24.29 kg/m².    Intake/Output Summary (Last 24 hours) at 9/3/2023 1546  Last data filed at 9/3/2023 0600  Gross per 24 hour   Intake 420 ml   Output 400 ml   Net 20 ml         Physical Exam      Clear lungs bilaterally, unlabored breathing, on room air, no cyanosis  Heart sounds indicate a regular rate and rhythm   Appears fatigued, sluggish bowel responding to questions   Unchanged left-sided facial droop chronic  Unchanged left lower extremity weakness-chronic   5/5 fist  bilaterally upon command, does demonstrate at least 3/5 hip flexion on the right  Pupils equal BL

## 2023-09-03 NOTE — ACP (ADVANCE CARE PLANNING)
Advance Care Planning     Date: 09/03/2023    Today a voluntary meeting took place: bedside    Patient Participation: Patient is unable to participate     Attendees (Name and  Relationship to patient): Legal surrogate decision-maker: daughters     Staff attendees (Name and  Role): Attending Dr. Prashanth Pena    ACP Conversation (General): Understanding of advance care planning and role of health care agent defined code status    Code Status: Full Code     ACP Documents: None    Goals of care: The family endorses that what is most important right now is to focus on extending life as long as possible, even it it means sacrificing quality and curative/life-prolongation (regardless of treatment burdens)    Accordingly, we have decided that the best plan to meet the patient's goals includes continuing with treatment

## 2023-09-03 NOTE — AI DETERIORATION ALERT
Artificial Intelligence Notification  [facility]  [address]  [place] [address]  Phone: [phone]    This documentation was triggered by an Artificial Intelligence Notification:    Admit Date: 2023   LOS: 2  Code Status: Full Code  : 1935  Age: 87 y.o.  Weight:   Wt Readings from Last 1 Encounters:   23 64.2 kg (141 lb 8.6 oz)        Sex: female  Bed: 1150/1150 A  MRN: [id number]  Attending Physician: Prashanth Pena MD     Date of Alert: 2023  Time AI Alert Received: 0802            Vitals:    23 0758   BP: (!) 89/49   Pulse: 95   Resp:    Temp: 97.6 °F (36.4 °C)     SpO2: 95 %      Artificial Intelligence alert discussed with Provider:     Name: Bs Nurse   Date/Time of Provider Notification: 08      Patient Condition: Stable. Will follow up on Bp. Pt Awake and alert talking to her daughter.

## 2023-09-03 NOTE — CONSULTS
[facility]  Neurology  Consult Note    Patient Name: Selma Lux  MRN: [id number]  Admission Date: 8/31/2023  Hospital Length of Stay: 1 days  Code Status: Full Code   Attending Provider: Prashanth Pena MD   Consulting Provider: Catarina Steven MD  Primary Care Physician: Génesis Rosario MD  Principal Problem:Generalized weakness    Inpatient consult to Neurology  Consult performed by: Catarina Calderon MD  Consult ordered by: Prashanth Pena MD         Subjective:     Chief Complaint:  Delirium      HPI:   88 y/o F w PMH of vascular dementia, subdural hematoma (w residual LSW), Afib (AC), R chorea movements, adrenal insufficiency, hypertension, COPD, RA with ILD on hydroxychloroquine and mycophenolate, admitted to hospital medicine for acute on chronic mental status change and neurology consulted. She was admitted on 8/31/23 after daughter noted that she was having a decline  at home that  consisted  more  so of sleep-wake cycle abnormalities with behavioral changes. She  was started on seroquel and caused her to then be very drowsy. At the ED patient was initially managed as  possible sepsis and  workup consisted for most likely dehydrations. Per review patient was able to do some of her ADLs prior to this including feeding herself and now she has been very generally weak.   Workup and management those far includes: patient being hypotensive on admit but rapidly corrected without fluid however patient did eventually receive a L of LR and pip/tazo plus vanc for empiric sepsis. Blood cultures drawn and NGTD. Lab w/u performed in ED revealed stable anemia without leukocytosis. CXR clear. UA not c/w with UTI. MRI and CT head unrevealing for any acute change  but  there's significant generalized volume loss with extensive microvascular  changes. She had EEG that showed mild to moderate generalized non-specific cerebral dysfunction with no electrographic seizures or  indications of seizure tendency.          Past Medical History:   Diagnosis Date    Acute cystitis without hematuria 2/27/2020    Acute hypoxemic respiratory failure 4/11/2018    Acute respiratory failure with hypoxia 3/2/2020    KERMIT (acute kidney injury) 3/13/2019    Altered mental status     Anemia     Arthritis     Bilateral hand pain 3/14/2018    Branch retinal vein occlusion, left eye 2/20/2015    Chronic bilateral low back pain without sciatica 3/23/2017    Chronic renal failure in pediatric patient, stage 3 (moderate) 4/15/2018    Chronic renal failure syndrome, stage 3 (moderate) 4/15/2018    Chronic systolic (congestive) heart failure 8/6/2021    Last Assessment & Plan:  Formatting of this note might be different from the original. -last ANTOLIN was done on 03/01/2020:  Grade 1 mild left ventricular diastolic dysfunction    Cognitive communication deficit 12/19/2017    COPD exacerbation 1/23/2022    Cystoid macular edema, left eye 2/20/2015    Cystoid macular edema, left eye 2/20/2015    Delirium 12/30/2021    DJD (degenerative joint disease) of cervical spine 5/15/2013    Enterococcal bacteremia     Fatty liver 8/26/2014    Goiter 4/9/2018    Hashimoto's disease     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    History of 2019 novel coronavirus disease (COVID-19) 4/11/2022 2/2022    Hypertension     Hypertensive encephalopathy     IBS (irritable bowel syndrome) 6/21/2017    IGT (impaired glucose tolerance) 1/12/2016    Intracranial subdural hematoma 1/4/2022    Last Assessment & Plan: Formatting of this note might be different from the original. Hospitalization late 2021, w/ rehab to follow (no notes available) --12/13/2021-CT head revealed thin extra-axial hyperdense collection overlying the right cerebral convexity compatible with acute subdural hematoma, neurosurgery was consulted, patient was noted with Keppra 500 mg b.i.d., apixaban was held -12/19/    Iron deficiency anemia secondary to  inadequate dietary iron intake 6/24/2013    Joint pain     Keratoconjunctivitis sicca of both eyes not specified as Sjogren's 7/29/2016    Leiomyoma of uterus, unspecified 9/16/2014    Long QT interval 6/29/2016    Due to medication (plaquenil)     Macular edema 1/12/2015    Multinodular goiter 1/12/2016    Neuropathy 8/23/2017    Plaquenil causing adverse effect in therapeutic use 10/7/2016    Pneumococcal vaccine refused 8/17/2016    Pneumonia due to Streptococcus pneumoniae 4/5/2018    Poor nutrition 12/23/2018    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/2015    RA (rheumatoid arthritis) 12/13/2021    -managed by rhematology, since this is a duplicate problem list entry, will archive this     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    s/p Right Total knee replacement 5/15/2013    Scoliosis of thoracic spine 5/15/2013    Small vessel disease, cerebrovascular 12/28/2017    Stroke     Traumatic subdural hemorrhage with loss of consciousness of unspecified duration, initial encounter 1/10/2023    12/2021    UTI (urinary tract infection) 12/29/2018    Vascular dementia 12/6/2017       Past Surgical History:   Procedure Laterality Date    BREAST CYST EXCISION      CATARACT EXTRACTION      COLONOSCOPY N/A 9/29/2015    Procedure: COLONOSCOPY;  Surgeon: FIDELINA Sanchez MD;  [facility]  Service: Endoscopy;  Laterality: N/A;    EYE SURGERY      INJECTION OF ANESTHETIC AGENT AROUND NERVE Left 4/19/2021    Procedure: BLOCK, NERVE, FEMORAL AND OBTURATOR;  Surgeon: Shivam Gonzalez MD;  Location: [facility];  Service: Pain Management;  Laterality: Left;  consent needed    JOINT REPLACEMENT      right knee    KNEE SURGERY Left 12/31/2013    TKR    left parotidectomy      mixed tumor    TN EVAL,SWALLOW FUNCTION,CINE/VIDEO RECORD  6/5/2021         SALIVARY GLAND SURGERY      TONSILLECTOMY      UPPER GASTROINTESTINAL ENDOSCOPY         Review of patient's  allergies indicates:  No Known Allergies    Current Neurological Medications:     No current facility-administered medications on file prior to encounter.     Current Outpatient Medications on File Prior to Encounter   Medication Sig    albuterol-ipratropium (DUO-NEB) 2.5 mg-0.5 mg/3 mL nebulizer solution Take 3 mLs by nebulization 2 (two) times daily as needed for Shortness of Breath. Rescue    apixaban (ELIQUIS) 2.5 mg Tab Take 1 tablet (2.5 mg total) by mouth 2 (two) times daily.    atorvastatin (LIPITOR) 40 MG tablet Take 1 tablet (40 mg total) by mouth every evening.    baclofen (LIORESAL) 10 MG tablet Take 0.5 tablets (5 mg total) by mouth with lunch AND 0.5 tablets (5 mg total) daily with dinner or evening meal AND 1 tablet (10 mg total) every evening.    cholecalciferol, vitamin D3, 125 mcg (5,000 unit) capsule Take 1 capsule (5,000 Units total) by mouth once daily.    ferrous sulfate, dried (SLOW FE) 160 mg (50 mg iron) TbSR Take 1 tablet (160 mg total) by mouth every other day.    fluticasone-salmeterol diskus inhaler 100-50 mcg Inhale 1 puff into the lungs 2 (two) times daily. Controller    gabapentin (NEURONTIN) 100 MG capsule Take 1 capsule (100 mg total) by mouth 2 (two) times daily.    gabapentin 5% baclofen 2% amitriptyline 2% topical cream Apply topically 3 (three) times daily.    hydrOXYchloroQUINE (PLAQUENIL) 200 mg tablet Take 1 tablet (200 mg total) by mouth 2 (two) times daily.    LIDOcaine (LIDODERM) 5 % Place 1 patch onto the skin once daily. Remove & Discard patch within 12 hours as needed for pain or as directed by MD Place patch onto lower back as needed    magnesium oxide (MAG-OX) 400 mg (241.3 mg magnesium) tablet Take 400 mg by mouth once daily.    mycophenolate (CELLCEPT) 500 mg Tab Take 1 tablet (500 mg total) by mouth 2 (two) times daily.    pantoprazole (PROTONIX) 40 MG tablet Take 1 tablet (40 mg total) by mouth once daily.    predniSONE (DELTASONE) 10 MG tablet Take  1 tablet (10 mg total) by mouth once daily.    thiamine 100 MG tablet Take 100 mg by mouth once daily.    gabapentin (NEURONTIN) 300 MG capsule Take 1 capsule (300 mg total) by mouth every evening.    QUEtiapine (SEROQUEL) 25 MG Tab Take 1 tablet (25 mg total) by mouth nightly as needed (insomnia). (Patient not taking: Reported on 8/31/2023)    sulfamethoxazole-trimethoprim 800-160mg (BACTRIM DS) 800-160 mg Tab One tablet by mouth every Monday, Wednesday, Friday to prevent lung infection    [DISCONTINUED] calcium carbonate (TUMS) 200 mg calcium (500 mg) chewable tablet Take 2 tablets (1,000 mg total) by mouth once daily. (Patient not taking: No sig reported)    [DISCONTINUED] famotidine (PEPCID) 20 MG tablet Take 1 tablet (20 mg total) by mouth 2 (two) times daily. (Patient not taking: Reported on 6/15/2022)    [DISCONTINUED] lacosamide (VIMPAT) 10 mg/mL Soln oral solution Take 10 mLs (100 mg total) by mouth every 12 (twelve) hours. (Patient not taking: No sig reported)     Family History       Problem Relation (Age of Onset)    Breast cancer Maternal Grandmother    Cancer Father    Heart disease Mother    Hypertension Mother    Hyperthyroidism Mother, Other    Prostate cancer Father          Tobacco Use    Smoking status: Never     Passive exposure: Never    Smokeless tobacco: Never   Substance and Sexual Activity    Alcohol use: Yes     Alcohol/week: 0.0 standard drinks of alcohol     Comment: very seldom     Drug use: No    Sexual activity: Not Currently     Comment: ,  age 52,      Review of Systems   Unable to perform ROS: Mental status change     Objective:     Vital Signs (Most Recent):  Temp: 98.7 °F (37.1 °C) (09/02/23 1928)  Pulse: 100 (09/02/23 1928)  Resp: (!) 24 (09/02/23 1928)  BP: (!) 106/54 (09/02/23 1928)  SpO2: (!) 94 % (09/02/23 1928) Vital Signs (24h Range):  Temp:  [97.6 °F (36.4 °C)-98.7 °F (37.1 °C)] 98.7 °F (37.1 °C)  Pulse:  [] 100  Resp:  [16-24] 24  SpO2:  [93  %-98 %] 94 %  BP: ()/(50-69) 106/54     Weight: 64.2 kg (141 lb 8.6 oz)  Body mass index is 24.29 kg/m².     Physical Exam  Vitals and nursing note reviewed.   Constitutional:       Appearance: Normal appearance.      Comments: Fluctuating exam. In the morning more arousable and able to stay up for longer  but then in the afternoon complaining  of abdominal pain and less interactive   HENT:      Head: Normocephalic and atraumatic.      Nose: Nose normal.      Mouth/Throat:      Mouth: Mucous membranes are moist.   Eyes:      Extraocular Movements: Extraocular movements intact.      Pupils: Pupils are equal, round, and reactive to light.   Cardiovascular:      Rate and Rhythm: Normal rate and regular rhythm.   Abdominal:      General: Bowel sounds are normal. There is no distension.      Palpations: Abdomen is soft.      Tenderness: There is no abdominal tenderness.   Musculoskeletal:         General: No swelling. Normal range of motion.      Cervical back: Normal range of motion.   Skin:     General: Skin is warm.      Capillary Refill: Capillary refill takes less than 2 seconds.   Neurological:      Mental Status: She is alert and oriented to person, place, and time.      Cranial Nerves: Cranial nerves 2-12 are intact. No cranial nerve deficit.      Sensory: Sensory deficit present.      Motor: Weakness present.      Deep Tendon Reflexes:      Reflex Scores:       Bicep reflexes are 2+ on the right side and 2+ on the left side.       Patellar reflexes are 2+ on the right side and 2+ on the left side.         NEUROLOGICAL EXAMINATION:     MENTAL STATUS   Oriented to person, place, and time.   Attention: decreased. Concentration: decreased.   Level of consciousness: drowsy, arousable by tactile stimuli ,  arousable by verbal stimuli       Drowsy but able  to  wake up; fluctuating throughout the day      CRANIAL NERVES   Cranial nerves II through XII intact.     CN III, IV, VI   Pupils are equal, round, and  reactive to light.    MOTOR EXAM        Global generalized weakness, worst in lower  extremities  Able to light antigravity bl upper extremities   Able to  move R leg,minimal movement on left (baseline)     REFLEXES     Reflexes   Right biceps: 2+  Left biceps: 2+  Right patellar: 2+  Left patellar: 2+    GAIT AND COORDINATION        Chorea  like movement on RUE       Significant Labs: CBC:   Recent Labs   Lab 09/01/23 0349 09/02/23  0540   WBC 7.23 6.58   HGB 9.2* 9.7*   HCT 30.6* 32.1*   * 96*     CMP:   Recent Labs   Lab 09/01/23 0349 09/02/23  0540   GLU 85 57*   * 134*   K 4.6 4.5    106   CO2 19* 20*   BUN 23 17   CREATININE 1.4 1.1   CALCIUM 8.9 8.6*   MG 2.0 1.7   PROT 5.9* 5.6*   ALBUMIN 3.4* 3.3*   BILITOT 1.8* 1.9*   ALKPHOS 49* 53*   AST 12 14   ALT 10 9*   ANIONGAP 10 8     All pertinent lab results from the past 24 hours have been reviewed.    Significant Imaging: I have reviewed and interpreted all pertinent imaging results/findings within the past 24 hours.    Assessment and Plan:     Vascular Dementia  86 y/o F w PMH of vascular dementia, subdural hematoma (w residual LSW), Afib (AC), R chorea movements, adrenal insufficiency, hypertension, COPD, RA with ILD on hydroxychloroquine and mycophenolate, admitted to hospital medicine for acute on chronic mental status change and neurology consulted. Patient experiencing some changes on wake and sleep cycle that are most likely contributing to current mental status change. She has extensive white matter disease on her brain MRI (chronic issue) that with any precipitating factor such as  dehydration and poor sleep can trigger this mental status change. Workup on this admission unrevealing and no evidence to suggest acute on going process.     Recommendations   -- ordered basic nutritional vitamin levels that were missing   -- no further diagnostics recommended and should  focus on bowel regimen and delirium precautions at this time   --  agree with lower dose of seroquel and try to keep patient awake during the day.           VTE Risk Mitigation (From admission, onward)         Ordered     apixaban tablet 2.5 mg  2 times daily         08/31/23 2153     IP VTE HIGH RISK PATIENT  Once         08/31/23 2153     Place sequential compression device  Until discontinued         08/31/23 2153                Thank you for your consult. I will sign off. Please contact us if you have any additional questions.    Catarina Steven MD  Neurology  [facility] - Observation 11[facility]

## 2023-09-03 NOTE — HPI
88 y/o F w PMH of vascular dementia, subdural hematoma (w residual LSW), Afib (AC), R chorea movements, adrenal insufficiency, hypertension, COPD, RA with ILD on hydroxychloroquine and mycophenolate, admitted to hospital medicine for acute on chronic mental status change and neurology consulted. She was admitted on 8/31/23 after daughter noted that she was having a decline  at home that  consisted  more  so of sleep-wake cycle abnormalities with behavioral changes. She  was started on seroquel and caused her to then be very drowsy. At the ED patient was initially managed as  possible sepsis and  workup consisted for most likely dehydrations. Per review patient was able to do some of her ADLs prior to this including feeding herself and now she has been very generally weak.   Workup and management those far includes: patient being hypotensive on admit but rapidly corrected without fluid however patient did eventually receive a L of LR and pip/tazo plus vanc for empiric sepsis. Blood cultures drawn and NGTD. Lab w/u performed in ED revealed stable anemia without leukocytosis. CXR clear. UA not c/w with UTI. MRI and CT head unrevealing for any acute change  but  there's significant generalized volume loss with extensive microvascular  changes. She had EEG that showed mild to moderate generalized non-specific cerebral dysfunction with no electrographic seizures or indications of seizure tendency.

## 2023-09-03 NOTE — PT/OT/SLP PROGRESS
Occupational Therapy      Patient Name:  Selma Lux   MRN:  [id number]    Patient not seen today secondary OT attempted in am and family requested to come back in pm. In PM family declined and requested tomorrow am as pt. Receiving IV at fast rate  9/3/2023

## 2023-09-03 NOTE — ASSESSMENT & PLAN NOTE
Takes mycophenolate and hydroxychloroquine at home for ILD and connective tissue manifestatons of RA. Appears to be well controlled without boggy or tender joints on exam and stable pulmonary findings without the need for O2.    Plan:  - Continue HCQ, myco, prednisone (transiently switching over to solucortef for stress dosing)

## 2023-09-03 NOTE — PROGRESS NOTES
[facility] [address] [place] [address] Medicine  Progress Note    Patient Name: Selma Lux  MRN: [id number]  Patient Class: IP- Inpatient   Admission Date: 8/31/2023  Length of Stay: 2 days  Attending Physician: Prashanth Pena MD  Primary Care Provider: Génesis Rosario MD        Subjective:     Principal Problem:Generalized weakness        HPI:  Patient is a 88 yo F with PMH of vascular dementia, subdural hematoma, adrenal insufficiency, hypertension, COPD, RA with ILD on hydroxychloroquine and mycophenolate, pAF who is presenting with an acute on chronic mental status change. Patient is accompanied by her daughter who provides much of the history although the patient is able to answer questions about ROS.     In the ED,  Patient hypotensive on admit but rapidly corrected without fluid however patient did eventually receive a L of LR and pip/tazo plus vanc for empiric sepsis. Blood cultures drawn and pending. Lab w/u performed in ED revealed stable anemia without leukocytosis. CXR clear. UA not c/w with UTI. MRI and CT head unrevealing for any acute change. Patient did have what appears to be an KERMIT on her CMP although her creatinine has been fluctuating at a higher level than prior to may 2023.       Overview/Hospital Course:  Extended EEGs showed nonspecific generalized slowing but no acute epileptiform activity.      Interval History:  Patient noted to be somewhat tachypneic overnight.  Chest x-ray obtained this morning shows no acute infiltrates.  My evaluation patient is not in any acute distress but appears very sluggish/fatigued but protecting her airway and communicating.  No reported chest pain or shortness a breath.  Respirations have improved during the day shift.    Review of Systems  Objective:     Vital Signs (Most Recent):  Temp: 98 °F (36.7 °C) (09/03/23 1106)  Pulse: 88 (09/03/23 1545)  Resp: 18 (09/03/23 1545)  BP: (!) 102/54 (09/03/23 1106)  SpO2: (!) 93 % (09/03/23 1106) Vital  Signs (24h Range):  Temp:  [97.6 °F (36.4 °C)-99 °F (37.2 °C)] 98 °F (36.7 °C)  Pulse:  [] 88  Resp:  [16-28] 18  SpO2:  [92 %-97 %] 93 %  BP: ()/(49-56) 102/54     Weight: 64.2 kg (141 lb 8.6 oz)  Body mass index is 24.29 kg/m².    Intake/Output Summary (Last 24 hours) at 9/3/2023 1546  Last data filed at 9/3/2023 0600  Gross per 24 hour   Intake 420 ml   Output 400 ml   Net 20 ml         Physical Exam      Clear lungs bilaterally, unlabored breathing, on room air, no cyanosis  Heart sounds indicate a regular rate and rhythm   Appears fatigued, sluggish bowel responding to questions   Unchanged left-sided facial droop chronic  Unchanged left lower extremity weakness-chronic   5/5 fist  bilaterally upon command, does demonstrate at least 3/5 hip flexion on the right  Pupils equal BL        Assessment/Plan:      * Generalized weakness   MRI and CT which were both negative. Associated w/ worsening confusion at home, sleep disruption, and inability to feed herself; acute onset in 1 week  - possible adrenal crisis at home?  given neg findings for organic workup otherwise  - KERMIT resolved; EEG neg for acute seizures at this time;   - neurology consulted - conservative measures; do not recommend LP- family also declines LP.  - stablevit B 12; so far urine and blood cx's unremarkable  - fall precautions; pt/ot  - Diagnosis of exclusion would be worsening dementia/delirium  - given home prednisone dose today and additionally will cover with stress dose steroids (solucortef)    Heart failure with preserved ejection fraction  Appears stable clinically.     Most recent echo:   The left ventricle is normal in size with concentric hypertrophy and normal systolic function. The estimated ejection fraction is 65%.   Normal right ventricular size with normal right ventricular systolic function.   Grade II left ventricular diastolic dysfunction.   Severe left atrial enlargement.   Mild-to-moderate mitral  regurgitation.   The estimated PA systolic pressure is 43 mmHg.   Normal central venous pressure (3 mmHg).               Plan:  - continue to monitor  - euvolemic  - Avoid salt intake > 2 g/day  - magnesium > 2, potassium > 4  - cardiac diet      Somnolence  Monitor for improvement w/ steroid dosing  TSH wnl  Fall precautions  Hold CNS depressing meds       Vascular Dementia  Able to answer questions. Appears to be relatively somnolent but rouses to questions. She is accompanied by her daughter at bedside who explains that over the last 2 weeks approximately, she has been having disturbed sleep wake cycle and worsening delirium/delusions episodes.   Doing better with 12.5 mg of seroquel at night    COPD (chronic obstructive pulmonary disease)  Patient VBG on admission appears compatible with stable, compensated lung disease. Her home inhaler is not available on formulary however I have ordered an equivalent.    Plan:  - Continue to monitor    KERMIT (acute kidney injury)  resolved      Thrombocytopenia  Appears to be stable. Per PCP Dr. Lee, they were monitoring with periodic CBCs      AF (paroxysmal atrial fibrillation)  Patient with Long standing persistent (>12 months) atrial fibrillation which is uncontrolled currently with previous BB but taken off due to concern for AEs. Patient is currently in sinus rhythm.[id number] Score: 6. Anticoagulation indicated. Anticoagulation done with apixaban 2.5 BID.    Rheumatoid arthritis of multiple sites  Takes mycophenolate and hydroxychloroquine at home for ILD and connective tissue manifestatons of RA. Appears to be well controlled without boggy or tender joints on exam and stable pulmonary findings without the need for O2.    Plan:  - Continue HCQ, myco, prednisone (transiently switching over to solucortef for stress dosing)        VTE Risk Mitigation (From admission, onward)         Ordered     apixaban tablet 2.5 mg  2 times daily         08/31/23 5743     IP VTE  HIGH RISK PATIENT  Once         08/31/23 2153     Place sequential compression device  Until discontinued         08/31/23 2153                Discharge Planning   LETICIA: 9/2/2023     Code Status: Full Code   Is the patient medically ready for discharge?:     Reason for patient still in hospital (select all that apply): Patient trending condition, Laboratory test and Treatment  Discharge Plan A: Home with family                  Prashanth Pena MD  Department of Hospital Medicine   [facility] - Observation 11[facility]

## 2023-09-03 NOTE — PLAN OF CARE
Problem: Infection  Goal: Absence of Infection Signs and Symptoms  Outcome: Ongoing, Progressing     Problem: Adult Inpatient Plan of Care  Goal: Plan of Care Review  Outcome: Ongoing, Progressing  Goal: Patient-Specific Goal (Individualized)  Outcome: Ongoing, Progressing  Goal: Absence of Hospital-Acquired Illness or Injury  Outcome: Ongoing, Progressing  Goal: Optimal Comfort and Wellbeing  Outcome: Ongoing, Progressing  Goal: Readiness for Transition of Care  Outcome: Ongoing, Progressing     Problem: Adjustment to Illness (Delirium)  Goal: Optimal Coping  Outcome: Ongoing, Progressing     Problem: Altered Behavior (Delirium)  Goal: Improved Behavioral Control  Outcome: Ongoing, Progressing     Problem: Attention and Thought Clarity Impairment (Delirium)  Goal: Improved Attention and Thought Clarity  Outcome: Ongoing, Progressing     Problem: Sleep Disturbance (Delirium)  Goal: Improved Sleep  Outcome: Ongoing, Progressing     Problem: Fluid and Electrolyte Imbalance (Acute Kidney Injury/Impairment)  Goal: Fluid and Electrolyte Balance  Outcome: Ongoing, Progressing     Problem: Oral Intake Inadequate (Acute Kidney Injury/Impairment)  Goal: Optimal Nutrition Intake  Outcome: Ongoing, Progressing     Problem: Renal Function Impairment (Acute Kidney Injury/Impairment)  Goal: Effective Renal Function  Outcome: Ongoing, Progressing     Problem: Impaired Wound Healing  Goal: Optimal Wound Healing  Outcome: Ongoing, Progressing     Problem: Fall Injury Risk  Goal: Absence of Fall and Fall-Related Injury  Outcome: Ongoing, Progressing     Problem: Skin Injury Risk Increased  Goal: Skin Health and Integrity  Outcome: Ongoing, Progressing     Problem: Cognitive Impairment (Dementia Signs/Symptoms)  Goal: Optimized Cognitive Function (Dementia Signs/Symptoms)  Outcome: Ongoing, Progressing     Problem: Sleep Disturbance (Dementia Signs/Symptoms)  Goal: Improved Sleep (Dementia Signs/Symptoms)  Outcome: Ongoing,  Progressing     Problem: COPD (Chronic Obstructive Pulmonary Disease) Comorbidity  Goal: Maintenance of COPD Symptom Control  Outcome: Ongoing, Progressing     Problem: Heart Failure Comorbidity  Goal: Maintenance of Heart Failure Symptom Control  Outcome: Ongoing, Progressing     Problem: Hypertension Comorbidity  Goal: Blood Pressure in Desired Range  Outcome: Ongoing, Progressing     Problem: Dysrhythmia  Goal: Normalized Cardiac Rhythm  Outcome: Ongoing, Progressing   Pt progressing slowly towards goals.Telemetry maintained,SR-ST.remains lethargic ,unstimulated,falls asleep.oriented to self.follows simple commands.all sedating meds held last pm. Rr 24-28min,r/a sat 96%. Lactic wnl.had a large loose brown stool x1 and another small loose brown stool last night. Poor appetite.foam dressings reapplied to left buttock and left groin wounds.barrier cream applied to heels and heels floated.safety precautions maintained.daughter at edside.bed in low position.rails up x4.call bell in reach.bed alarm in use for pt safety.continue plan of care.

## 2023-09-04 PROBLEM — G93.40 ACUTE ENCEPHALOPATHY: Status: RESOLVED | Noted: 2019-08-27 | Resolved: 2023-01-01

## 2023-09-04 PROBLEM — J44.9 COPD (CHRONIC OBSTRUCTIVE PULMONARY DISEASE): Chronic | Status: RESOLVED | Noted: 2022-06-20 | Resolved: 2023-01-01

## 2023-09-04 NOTE — PROGRESS NOTES
Pharmacist Renal Dose Adjustment Note    Selma Lux is a 87 y.o. female being treated with the medication sulfamethoxazole/trimethoprim    Patient Data:    Vital Signs (Most Recent):  Temp: 98.4 °F (36.9 °C) (09/04/23 0814)  Pulse: 95 (09/04/23 1124)  Resp: 20 (09/04/23 0840)  BP: (!) 149/71 (09/04/23 0814)  SpO2: 97 % (09/04/23 0840) Vital Signs (72h Range):  Temp:  [97.4 °F (36.3 °C)-99 °F (37.2 °C)]   Pulse:  []   Resp:  [16-28]   BP: ()/(49-92)   SpO2:  [92 %-99 %]      Recent Labs   Lab 09/02/23  0540 09/03/23  0354 09/04/23  0545   CREATININE 1.1 2.2* 2.3*     Serum creatinine: 2.3 mg/dL (H) 09/04/23 0545  Estimated creatinine clearance: 15.4 mL/min (A)    Sulfamethoxazole/Trimethoprim DS 1 tablet q.12.h adjusted to Sulfamethoxazole/Trimethoprim SS tablet 1 tablet q.12.h.     Pharmacist's Name: Eris Bunch  Pharmacist's Extension: [phone]

## 2023-09-04 NOTE — PLAN OF CARE
Problem: Physical Therapy  Goal: Physical Therapy Goal  Description: Goals to be met by:      Patient will increase functional independence with mobility by performin. Supine to sit with contact guard Assistance  2. Sit to supine with contact guard Assistance  3. Sit to stand transfer with Minimal Assistance  4. Bed to chair transfer with Moderate Assistance using LRAD.   5. Sitting at edge of bed x10 minutes with Contact Guard Assistance    Outcome: Ongoing, Progressing   Evaluation completed, initiated plan of care.   Martha Jolly, PT  2023

## 2023-09-04 NOTE — PLAN OF CARE
Problem: Occupational Therapy  Goal: Occupational Therapy Goal  Description: Goals to be met by: 9/18/23     Patient will increase functional independence with ADLs by performing:    Feeding with Set-up Assistance.  UE Dressing with Minimal Assistance.  LE Dressing with Moderate Assistance.  Grooming while EOB with Stand-by Assistance.  Toileting from bedside commode with Moderate Assistance for hygiene and clothing management.   Toilet transfer to bedside commode with Moderate Assistance.  Upper extremity exercise program x20 reps per handout, with assistance as needed.    Outcome: Ongoing, Progressing

## 2023-09-04 NOTE — ASSESSMENT & PLAN NOTE
Recurring again; unclear if from dehydration  Submitted IV fluid challenge given charted history of dCHF  Recheck in AM  Renal ultrasound showing chronic medical disease

## 2023-09-04 NOTE — NURSING
Pt is resting in bed HOB elevated ,Tolerated medications well no acute distress noted ,parikh placed tolerated well.bed in lowest position call light within reach. wctm

## 2023-09-04 NOTE — ASSESSMENT & PLAN NOTE
Takes mycophenolate and hydroxychloroquine at home for ILD and connective tissue manifestatons of RA  - Continue HCQ, myco  - Solu-Cortef q.8 hours stress dosing, can deescalate down to prednisone in the next 1-2 days

## 2023-09-04 NOTE — PLAN OF CARE
Problem: Infection  Goal: Absence of Infection Signs and Symptoms  Outcome: Ongoing, Progressing     Problem: Adult Inpatient Plan of Care  Goal: Plan of Care Review  Outcome: Ongoing, Progressing  Goal: Patient-Specific Goal (Individualized)  Outcome: Ongoing, Progressing  Goal: Absence of Hospital-Acquired Illness or Injury  Outcome: Ongoing, Progressing  Goal: Optimal Comfort and Wellbeing  Outcome: Ongoing, Progressing  Goal: Readiness for Transition of Care  Outcome: Ongoing, Progressing     Problem: Adjustment to Illness (Delirium)  Goal: Optimal Coping  Outcome: Ongoing, Progressing     Problem: Altered Behavior (Delirium)  Goal: Improved Behavioral Control  Outcome: Ongoing, Progressing     Problem: Attention and Thought Clarity Impairment (Delirium)  Goal: Improved Attention and Thought Clarity  Outcome: Ongoing, Progressing     Problem: Sleep Disturbance (Delirium)  Goal: Improved Sleep  Outcome: Ongoing, Progressing     Problem: Fluid and Electrolyte Imbalance (Acute Kidney Injury/Impairment)  Goal: Fluid and Electrolyte Balance  Outcome: Ongoing, Progressing     Problem: Oral Intake Inadequate (Acute Kidney Injury/Impairment)  Goal: Optimal Nutrition Intake  Outcome: Ongoing, Progressing     Problem: Renal Function Impairment (Acute Kidney Injury/Impairment)  Goal: Effective Renal Function  Outcome: Ongoing, Progressing     Problem: Impaired Wound Healing  Goal: Optimal Wound Healing  Outcome: Ongoing, Progressing     Problem: Fall Injury Risk  Goal: Absence of Fall and Fall-Related Injury  Outcome: Ongoing, Progressing     Problem: Skin Injury Risk Increased  Goal: Skin Health and Integrity  Outcome: Ongoing, Progressing     Problem: Cognitive Impairment (Dementia Signs/Symptoms)  Goal: Optimized Cognitive Function (Dementia Signs/Symptoms)  Outcome: Ongoing, Progressing     Problem: Sleep Disturbance (Dementia Signs/Symptoms)  Goal: Improved Sleep (Dementia Signs/Symptoms)  Outcome: Ongoing,  Progressing     Problem: COPD (Chronic Obstructive Pulmonary Disease) Comorbidity  Goal: Maintenance of COPD Symptom Control  Outcome: Ongoing, Progressing     Problem: Heart Failure Comorbidity  Goal: Maintenance of Heart Failure Symptom Control  Outcome: Ongoing, Progressing     Problem: Hypertension Comorbidity  Goal: Blood Pressure in Desired Range  Outcome: Ongoing, Progressing     Problem: Dysrhythmia  Goal: Normalized Cardiac Rhythm  Outcome: Ongoing, Progressing   Pt progressing towards goals.Telemetry maintained,NSR.Pt alert now,oriented to self,place,conversing with family.ivf in progress.vss.safety precautions maintained.bed in low position.rails up.call bell in reach.bed alarm in use for pt safety.continue plan of care.

## 2023-09-04 NOTE — ASSESSMENT & PLAN NOTE
MRI and CT which were both negative. Associated w/ worsening confusion at home, sleep disruption, and inability to feed herself; acute onset in 1 week  - possible adrenal crisis at home?  given neg findings for organic workup otherwise  - KERMIT resolved; EEG neg for acute seizures at this time;   - neurology consulted - conservative measures; do not recommend LP- family also declines LP.  - stablevit B 12; so far urine and blood cx's unremarkable  - fall precautions; pt/ot  - Diagnosis of exclusion would be worsening dementia/delirium  -  stress dose steroids (solucortef); deescalate down to home prednisone in 1-2 days

## 2023-09-04 NOTE — PLAN OF CARE
Problem: Infection  Goal: Absence of Infection Signs and Symptoms  Outcome: Ongoing, Progressing     Problem: Adult Inpatient Plan of Care  Goal: Plan of Care Review  Outcome: Ongoing, Progressing  Goal: Optimal Comfort and Wellbeing  Outcome: Ongoing, Progressing  Goal: Readiness for Transition of Care  Outcome: Ongoing, Progressing     Problem: Adjustment to Illness (Delirium)  Goal: Optimal Coping  Outcome: Ongoing, Progressing     Problem: Altered Behavior (Delirium)  Goal: Improved Behavioral Control  Outcome: Ongoing, Progressing     Problem: Attention and Thought Clarity Impairment (Delirium)  Goal: Improved Attention and Thought Clarity  Outcome: Ongoing, Progressing     Problem: Sleep Disturbance (Delirium)  Goal: Improved Sleep  Outcome: Ongoing, Progressing     Problem: Fluid and Electrolyte Imbalance (Acute Kidney Injury/Impairment)  Goal: Fluid and Electrolyte Balance  Outcome: Ongoing, Progressing     Problem: Oral Intake Inadequate (Acute Kidney Injury/Impairment)  Goal: Optimal Nutrition Intake  Outcome: Ongoing, Progressing     Problem: Renal Function Impairment (Acute Kidney Injury/Impairment)  Goal: Effective Renal Function  Outcome: Ongoing, Progressing     Problem: Impaired Wound Healing  Goal: Optimal Wound Healing  Outcome: Ongoing, Progressing     Problem: Fall Injury Risk  Goal: Absence of Fall and Fall-Related Injury  Outcome: Ongoing, Progressing     Problem: Skin Injury Risk Increased  Goal: Skin Health and Integrity  Outcome: Ongoing, Progressing     Problem: Cognitive Impairment (Dementia Signs/Symptoms)  Goal: Optimized Cognitive Function (Dementia Signs/Symptoms)  Outcome: Ongoing, Progressing     Problem: Sleep Disturbance (Dementia Signs/Symptoms)  Goal: Improved Sleep (Dementia Signs/Symptoms)  Outcome: Ongoing, Progressing     Problem: COPD (Chronic Obstructive Pulmonary Disease) Comorbidity  Goal: Maintenance of COPD Symptom Control  Outcome: Ongoing, Progressing     Problem:  Heart Failure Comorbidity  Goal: Maintenance of Heart Failure Symptom Control  Outcome: Ongoing, Progressing     Problem: Hypertension Comorbidity  Goal: Blood Pressure in Desired Range  Outcome: Ongoing, Progressing     Problem: Dysrhythmia  Goal: Normalized Cardiac Rhythm  Outcome: Ongoing, Progressing

## 2023-09-04 NOTE — SUBJECTIVE & OBJECTIVE
Interval History:  No chest pain, no shortness a breath, afebrile.  Patient more perked up today.  Blood pressure stable.  However creatinine bump noted to be persistent since yesterday at 2.3.    Review of Systems  Objective:     Vital Signs (Most Recent):  Temp: 98.2 °F (36.8 °C) (09/04/23 1201)  Pulse: 88 (09/04/23 1201)  Resp: 18 (09/04/23 1201)  BP: 133/64 (09/04/23 1201)  SpO2: 97 % (09/04/23 1201) Vital Signs (24h Range):  Temp:  [97.4 °F (36.3 °C)-98.4 °F (36.9 °C)] 98.2 °F (36.8 °C)  Pulse:  [72-95] 88  Resp:  [16-22] 18  SpO2:  [94 %-97 %] 97 %  BP: (128-149)/(60-92) 133/64     Weight: 64.2 kg (141 lb 8.6 oz)  Body mass index is 24.29 kg/m².    Intake/Output Summary (Last 24 hours) at 9/4/2023 1649  Last data filed at 9/4/2023 0433  Gross per 24 hour   Intake 1120 ml   Output 775 ml   Net 345 ml         Physical Exam      Clear lungs bilaterally, unlabored breathing, on room air, no cyanosis   Heart sounds indicate a regular rate and rhythm   Moves all 4 extremities with limited unchanged range of motion of left lower extremity   Chronic left-sided facial droop  Pleasant, interactive  Awake alert, no acute distress  No obvious lower extremity edema

## 2023-09-04 NOTE — PT/OT/SLP EVAL
"Occupational Therapy   Co-Evaluation/Treatment    Name: Selma Lux  MRN: [id number]  Admitting Diagnosis: Generalized weakness  Recent Surgery: * No surgery found *      Recommendations:     Discharge Recommendations: nursing facility, skilled  Discharge Equipment Recommendations:  to be determined by next level of care  Barriers to discharge:  None    Assessment:     Selma Lux is a 87 y.o. female with a medical diagnosis of Generalized weakness.  She presents with the following performance deficits affecting function: weakness, impaired endurance, impaired self care skills, impaired functional mobility, gait instability, impaired balance, visual deficits, impaired cognition, decreased coordination, decreased upper extremity function, decreased lower extremity function, decreased safety awareness, impaired fine motor, impaired coordination.      Pt tolerated the session well this date. At this time, she is mostly limited by weakness, impaired cognition, and fine motor deficits. Pt required total A for LB dressing, Max A for toileting x2, and Mod A for feeding. Pt's daughter reports that she is far from her baseline at this time. She was able to feed herself and transfer into her chair with light assist. Pt would benefit from continued skilled acute OT services in order to maximize independence and safety with ADLs and functional mobility to ensure safe return to PLOF in the least restrictive environment. OT recommending SNF once pt is medically appropriate for d/c.     Rehab Prognosis: Good; patient would benefit from acute skilled OT services to address these deficits and reach maximum level of function.       Plan:     Patient to be seen 3 x/week to address the above listed problems via self-care/home management, therapeutic activities, therapeutic exercises, neuromuscular re-education  Plan of Care Expires: 10/04/23  Plan of Care Reviewed with: patient    Subjective     "I am on a fire truck" " when asked orientation questions    Chief Complaint: weakness  Patient/Family Comments/goals: To return to PLOF    Occupational Profile:  Living Environment: Pt lives with her 2 daughters in a H with 5 GLORIA and B HR. She has a tub/shower combo.   Previous level of function: Prior to admission, patients level of function was requiring light assist to perform pivot transfer to w/c or BSC. Dependent with w/c propulsion. Patient was transferring into garden tub with caregiver assist 3 days/week. Patient was independent with self-feeding. She required assist for dressing  Equipment Used at Home: bedside commode, walker, rolling, wheelchair, cane, straight, cane, quad  Assistance upon Discharge: Pt will have assistance from her daughters and paid caregivers from 9:30-3:30    Pain/Comfort:  Pain Rating 1: 0/10    Patients cultural, spiritual, Methodist conflicts given the current situation: no    Objective:     Co-evaluation/treatment performed due to patient's multiple deficits requiring two skilled therapists to appropriately and safely assess patient's strength and endurance while facilitating functional tasks in addition to accommodating for patient's activity tolerance.     Communicated with: RN prior to session.  Patient found HOB elevated with telemetry, peripheral IV, PureWick, bed alarm upon OT entry to room.    General Precautions: Standard, fall  Orthopedic Precautions: N/A  Braces: N/A  Respiratory Status: Room air    Occupational Performance:    Bed Mobility:    Patient completed Rolling/Turning to Right with moderate assistance  Patient completed Scooting/Bridging with moderate assistance  Patient completed Supine to Sit with moderate assistance  Patient completed Sit to Supine with maximal assistance  Pt sat EOB with CGA and eventually Max A due to fatigue     Functional Mobility/Transfers:  Patient completed Sit <> Stand Transfer with moderate assistance and of 2 persons  with  hand-held assist    Performed 2 trials   Unable to perform complete stand     Activities of Daily Living:  Feeding:  moderate assistance : To eat breakfast supported in the bed. Pt provided with set up and assistance for visual-motor skills with the RUE.   Lower Body Dressing: total assistance : To don B socks at bed level   Toileting: maximal assistance and total assistance : For marialuisa care initially in standing, but requiring total A at bed level after continuous BM    Cognitive/Visual Perceptual:  Cognitive/Psychosocial Skills:     -       Oriented to: Person, Place, and Time   -       Follows Commands/attention:Follows one-step commands  -       Memory: Impaired STM, Impaired LTM, and Poor immediate recall  -       Safety awareness/insight to disability: impaired   -       Mood/Affect/Coping skills/emotional control: Lethargic and Isolative  Visual/Perceptual:      -Impaired  - visual motor coordination       Physical Exam:  Balance:  Static Sitting   contact guard assistance   Dynamic Sitting   maximal assistance   Static Standing   moderate assistance and of 2 persons   Dynamic Standing    N/A     Upper Extremity Function:   Dominance: Right   Left UE Right UE   UE Edema None noted None noted   UE ROM WFL except decreased Sh flexion WFL except Decreased Sh flexion    UE Strength 3-/5 grossly  2+/5 grossly     Strength fair poor   Sensation    -       Intact    -       Intact   Fine Motor Skills:     -       Impaired  Left hand thumb/finger opposition skills   and Left hand, manipulation of objects      -       Impaired  Right hand thumb/finger opposition skills   and Right hand, manipulation of objects     Gross Motor Skills:   fair   poor       AMPAC 6 Click ADL:  AMPAC Total Score: 9    Treatment & Education:  Therapist provided facilitation and instruction of proper body mechanics and fall prevention strategies during tasks listed above.  Discussed OT POC and answered all questions within OT scope of practice.  Whiteboard  updated     Patient left HOB elevated with all lines intact, call button in reach, bed alarm on, and RN notified    GOALS:   Multidisciplinary Problems       Occupational Therapy Goals          Problem: Occupational Therapy    Goal Priority Disciplines Outcome Interventions   Occupational Therapy Goal     OT, PT/OT Ongoing, Progressing    Description: Goals to be met by: 9/18/23     Patient will increase functional independence with ADLs by performing:    Feeding with Set-up Assistance.  UE Dressing with Minimal Assistance.  LE Dressing with Moderate Assistance.  Grooming while EOB with Stand-by Assistance.  Toileting from bedside commode with Moderate Assistance for hygiene and clothing management.   Toilet transfer to bedside commode with Moderate Assistance.  Upper extremity exercise program x20 reps per handout, with assistance as needed.                         History:     Past Medical History:   Diagnosis Date    Acute cystitis without hematuria 2/27/2020    Acute hypoxemic respiratory failure 4/11/2018    Acute respiratory failure with hypoxia 3/2/2020    KERMIT (acute kidney injury) 3/13/2019    Altered mental status     Anemia     Arthritis     Bilateral hand pain 3/14/2018    Branch retinal vein occlusion, left eye 2/20/2015    Chronic bilateral low back pain without sciatica 3/23/2017    Chronic renal failure in pediatric patient, stage 3 (moderate) 4/15/2018    Chronic renal failure syndrome, stage 3 (moderate) 4/15/2018    Chronic systolic (congestive) heart failure 8/6/2021    Last Assessment & Plan:  Formatting of this note might be different from the original. 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 Hospitalization late 2021, w/ rehab to follow (no notes available) --12/13/2021-CT head revealed thin extra-axial hyperdense collection overlying the right cerebral convexity compatible with acute subdural hematoma, neurosurgery was consulted, patient was noted with Keppra 500 mg b.i.d., apixaban was held -12/19/    Iron deficiency anemia secondary to inadequate dietary iron intake 6/24/2013    Joint pain     Keratoconjunctivitis sicca of both eyes not specified as Sjogren's 7/29/2016    Leiomyoma of uterus, unspecified 9/16/2014    Long QT interval 6/29/2016    Due to medication (plaquenil)     Macular edema 1/12/2015    Multinodular goiter 1/12/2016    Neuropathy 8/23/2017    Plaquenil causing adverse effect in therapeutic use 10/7/2016    Pneumococcal vaccine refused 8/17/2016    Pneumonia due to Streptococcus pneumoniae 4/5/2018    Poor nutrition 12/23/2018    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/2015    RA (rheumatoid arthritis) 12/13/2021    -managed by rhematology, since this is a duplicate problem list entry, will archive this     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         Past  Surgical History:   Procedure Laterality Date    BREAST CYST EXCISION      CATARACT EXTRACTION      COLONOSCOPY N/A 9/29/2015    Procedure: COLONOSCOPY;  Surgeon: FIDELINA Sanchez MD;  Location: [facility] ([address] [place] [address]);  Service: Endoscopy;  Laterality: N/A;    EYE SURGERY      INJECTION OF ANESTHETIC AGENT AROUND NERVE Left 4/19/2021    Procedure: BLOCK, NERVE, FEMORAL AND OBTURATOR;  Surgeon: Shivam Gonzalez MD;  Location: [facility], [place], operated by [facility];  Service: Pain Management;  Laterality: Left;  consent needed    JOINT REPLACEMENT      right knee    KNEE SURGERY Left 12/31/2013    TKR    left parotidectomy      mixed tumor    NC EVAL,SWALLOW FUNCTION,CINE/VIDEO RECORD  6/5/2021         SALIVARY GLAND SURGERY      TONSILLECTOMY      UPPER GASTROINTESTINAL ENDOSCOPY         Time Tracking:     OT Date of Treatment: 09/04/23  OT Start Time: 0835  OT Stop Time: 0858  OT Total Time (min): 23 min    Billable Minutes:Evaluation 10  Self Care/Home Management 13    9/4/2023

## 2023-09-04 NOTE — ASSESSMENT & PLAN NOTE
Monitor for improvement w/ steroid dosing  TSH and b12 stable, CT neg; wnl  Fall precautions  Markedly improved after stress dosing steroids initiated  Hold CNS depressing meds

## 2023-09-04 NOTE — PT/OT/SLP EVAL
Physical Therapy Evaluation  Co-evaluation with OT due to acuity of condition, level of skilled assist needed for assessment of safety with mobility.   Patient Name:  Selma Lux   MRN:  [id number]    Recommendations:     Discharge Recommendations: nursing facility, skilled   Discharge Equipment Recommendations:  (TBD pending progress)   Barriers to discharge:  patient below functional baseline, caregiver burden    Assessment:     Selma Lux is a 87 y.o. female admitted with a medical diagnosis of Generalized weakness.  She presents with the following impairments/functional limitations: weakness, impaired endurance, impaired self care skills, impaired functional mobility, decreased upper extremity function, decreased lower extremity function, visual deficits, impaired balance, decreased safety awareness, decreased ROM, impaired fine motor, impaired cognition, gait instability. Per the RN, the patient is more alert today, she remains intermittently confused, she required max verbal and tactile cues to follow simple motor commands. She required moderate assistance x2 ILIANA HHA for sit to stand. She demo'd variable sitting balance, she primarily needed moderate assistance for balance (required maximum assistance to moments of contact guard assist), patient with spontaneous posterior loss of balance with delayed righting reactions. Patient returned to supine, set up for breakfast, patient with impaired UE coordination with utensil use also with poor vision limiting her ability to feed herself- RN alerted patient will need 1:1 feeding assist.   PT called patient's daughter Do at end of session to discuss patient's performance in therapy and confirm social history. Prior to admission, patient was able to perform squat pivot transfers bed to w/c with supervision to light physical assist. The patient was unable to self propel in w/c due to chronic rotator cuff injuries. The patient was able to feed  "herself independently prior to admission.   She is not safe to return home due to risk of falls. She would benefit from SNF placement to address the above deficits and maximize their functional mobility.      Rehab Prognosis: Fair; patient would benefit from acute skilled PT services to address these deficits and reach maximum level of function.    Recent Surgery: * No surgery found *      Plan:     During this hospitalization, patient to be seen 3 x/week to address the identified rehab impairments via gait training, therapeutic activities, therapeutic exercises, neuromuscular re-education and progress toward the following goals:    Plan of Care Expires:  10/04/23    Subjective     Chief Complaint: "I'm feeling wet"  Patient/Family Comments/goals: per daughter, "Did she sit up in the chair with therapy?"  Pain/Comfort:  Pain Rating 1:  (patient unable to rate or localize)    Patients cultural, spiritual, Sikh conflicts given the current situation: no    Living Environment:  Per daughter, Do- the patient's 2 daughters live with her in a [facility], [address] [employer], T/S.   Prior to admission, patients level of function was requiring light assist to perform pivot transfer to w/c or BSC. Dependent with w/c propulsion. Patient was transferring into garden tub with caregiver assist 3 days/week. Patient was independent with self-feeding. She required assist for dressing.  Equipment used at home: bedside commode, walker, rolling, wheelchair, cane, quad, cane, straight.  DME owned (not currently used): rolling walker.  Upon discharge, patient will have assistance from daughters, paid caregiver daily 9:30am-3:30pm.    Objective:     Communicated with RN prior to session.  Patient found HOB elevated with telemetry, bed alarm, peripheral IV, PureWick  upon PT entry to room.     General Precautions: Standard, fall  Orthopedic Precautions:N/A   Braces: N/A  Respiratory Status: Room air    Exams:    Cognitive Exam  Patient is " "A&O xself, recalled birth year inaccurrately "1925", oriented to month/year, stated "I'm in a hospital", then stated "I'm on a firetruck"   -follows ~25% of one -step commands with max verbal/tactile cues   Fine Motor Coordination   -       impaired UE noted with attempts at self-feeding     Postural Exam Patient presented with the following abnormalities:    -       Rounded shoulders  -       Forward head  -       Kyphosis  -       Posterior pelvic tilt  -       Weight shift posterior, sudden spontaneous LOB posteriorly with absent righting reactions   Sensation    -       Light touch assessment limited 2/2 cognition   Skin Integrity/Edema     -       Skin integrity: visibly intact  -       Edema: NA   R LE ROM WNL   R LE Strength 3/5 hip flexion, knee ext/flex, and ankle DF/PF; limited by cognition   L LE ROM WNL   L LE Strength  3/5 hip flexion, knee ext/flex, and ankle DF/PF; limited by cognition       Balance   Static Sitting contact guard assist to maximum assistance    Dynamic Sitting moderate assistanceo to maximum assistance    Static Standing maximum assistance x2 ILIANA HHA   Dynamic Standing       NA        Functional Mobility:    Bed Mobility  Rolling to R: moderate assistance   Supine to Sit on the R side:  moderate assistance, assist at trunk and LE, impaired motor planning and initiation   Sit to supine: moderate assistancex 2  Scoot to HOB in supine: total assistancex 2 drawsheet     Transfers Sit to Stand:  moderate assistance x2 ILIANA HHA, ILIANA feet and knees blocked, unable to fully extend through hips/knees to stand    Gait  Gait Distance: unable to shift weight or initiate stepping          AM-PAC 6 CLICK MOBILITY  Total Score:10       Treatment  & Education:  Patient and daughters educated on:  -role of therapy  -goals of session  -PT POC  -benefits of out of bed mobility and consequences of immobility  -calling for staff assist to mobilize safely  Patient agreeable to mobilize with therapy. "     Patient noted to be soiled with BM at start of session. Performed sit to stand for pericare and pad change. On return to supine and replacement of purewick- patient noted to be actively having BM. RN and PCT alerted.     Sitting edge of bed ~10 minutes, maximum assistance to brief moments of contact guard assist , weight shift posteriorly- sudden and spontaneous  -Posterior pelvic tilt, kyphosis, cervical flexion  -Visual/verbal/manual cues for midline orientation, thoracic and cervical extension, anterior pelvic tilt, scapula retraction and depression  -Graded postural support to promote patient initiating righting reactions  -Loss of balance with UE/LE movement, requiring maximum assistance     Standing balance, maximum assistance x2, for ~15 seconds  -Demonstrates trunk/hip/knee flexion in standing with posterior lean  -Manual/verbal cues and facilitation for hip extension, trunk extension, cues to look up  -Manual/verbal cues for quad and glute engagement  -Blocking BILfeet/knees    Called patient's daughter at end of session to discuss patient's performance with therapy, POC, DC recommendation (for post-acute placement) and therapy's limited role in DC planning process. They verbalized understanding and agreement with therapy POC.          Patient left HOB elevated with all lines intact, call button in reach, bed alarm on, and RN and PCT notified.    GOALS:   Multidisciplinary Problems       Physical Therapy Goals          Problem: Physical Therapy    Goal Priority Disciplines Outcome Goal Variances Interventions   Physical Therapy Goal     PT, PT/OT Ongoing, Progressing     Description: Goals to be met by:      Patient will increase functional independence with mobility by performin. Supine to sit with contact guard Assistance  2. Sit to supine with contact guard Assistance  3. Sit to stand transfer with Minimal Assistance  4. Bed to chair transfer with Moderate Assistance using LRAD.   5.  Sitting at edge of bed x10 minutes with Contact Guard Assistance                         History:     Past Medical History:   Diagnosis Date    Acute cystitis without hematuria 2/27/2020    Acute hypoxemic respiratory failure 4/11/2018    Acute respiratory failure with hypoxia 3/2/2020    KERMIT (acute kidney injury) 3/13/2019    Altered mental status     Anemia     Arthritis     Bilateral hand pain 3/14/2018    Branch retinal vein occlusion, left eye 2/20/2015    Chronic bilateral low back pain without sciatica 3/23/2017    Chronic renal failure in pediatric patient, stage 3 (moderate) 4/15/2018    Chronic renal failure syndrome, stage 3 (moderate) 4/15/2018    Chronic systolic (congestive) heart failure 8/6/2021    Last Assessment & Plan:  Formatting of this note might be different from the original. 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 Hospitalization late 2021, w/ rehab to follow (no notes available) --12/13/2021-CT head revealed thin extra-axial hyperdense collection overlying the right cerebral convexity compatible with acute subdural hematoma, neurosurgery was consulted, patient was noted with Keppra 500 mg b.i.d., apixaban was held -12/19/    Iron  deficiency anemia secondary to inadequate dietary iron intake 6/24/2013    Joint pain     Keratoconjunctivitis sicca of both eyes not specified as Sjogren's 7/29/2016    Leiomyoma of uterus, unspecified 9/16/2014    Long QT interval 6/29/2016    Due to medication (plaquenil)     Macular edema 1/12/2015    Multinodular goiter 1/12/2016    Neuropathy 8/23/2017    Plaquenil causing adverse effect in therapeutic use 10/7/2016    Pneumococcal vaccine refused 8/17/2016    Pneumonia due to Streptococcus pneumoniae 4/5/2018    Poor nutrition 12/23/2018    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/2015    RA (rheumatoid arthritis) 12/13/2021    -managed by rhematology, since this is a duplicate problem list entry, will archive this     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       Past Surgical History:   Procedure Laterality Date    BREAST CYST EXCISION      CATARACT EXTRACTION      COLONOSCOPY N/A 9/29/2015    Procedure: COLONOSCOPY;  Surgeon: FIDELINA Sanchez MD;  Location: [address]);  Service: Endoscopy;  Laterality: N/A;    EYE SURGERY      INJECTION OF ANESTHETIC AGENT AROUND NERVE Left 4/19/2021    Procedure: BLOCK, NERVE, FEMORAL AND OBTURATOR;  Surgeon: Shivam Gonzalez MD;  Location: [facility];  Service: Pain Management;  Laterality: Left;  consent needed    JOINT REPLACEMENT      right knee    KNEE SURGERY Left 12/31/2013    TKR    left parotidectomy      mixed tumor    GA EVAL,SWALLOW FUNCTION,CINE/VIDEO RECORD  6/5/2021         SALIVARY GLAND SURGERY      TONSILLECTOMY      UPPER GASTROINTESTINAL ENDOSCOPY         Time Tracking:     PT  Received On: 09/04/23  PT Start Time: 0835     PT Stop Time: 0900  PT Total Time (min): 25 min     Called patient's daughter   Start time: 9:05  End Time: 9:15  Additional time: 10 min    Total therapy time: 35 min     Billable Minutes: Evaluation 10, Therapeutic Activity 10, and Neuromuscular Re-education 15      09/04/2023

## 2023-09-04 NOTE — PROGRESS NOTES
[facility] [address] [place] [address] Medicine  Progress Note    Patient Name: Selma Lux  MRN: [id number]  Patient Class: IP- Inpatient   Admission Date: 8/31/2023  Length of Stay: 3 days  Attending Physician: Prashanth Pena MD  Primary Care Provider: Génesis Rosario MD        Subjective:     Principal Problem:Generalized weakness        HPI:  Patient is a 88 yo F with PMH of vascular dementia, subdural hematoma, adrenal insufficiency, hypertension, COPD, RA with ILD on hydroxychloroquine and mycophenolate, pAF who is presenting with an acute on chronic mental status change. Patient is accompanied by her daughter who provides much of the history although the patient is able to answer questions about ROS.     In the ED,  Patient hypotensive on admit but rapidly corrected without fluid however patient did eventually receive a L of LR and pip/tazo plus vanc for empiric sepsis. Blood cultures drawn and pending. Lab w/u performed in ED revealed stable anemia without leukocytosis. CXR clear. UA not c/w with UTI. MRI and CT head unrevealing for any acute change. Patient did have what appears to be an KERMIT on her CMP although her creatinine has been fluctuating at a higher level than prior to may 2023.       Overview/Hospital Course:  Extended EEGs showed nonspecific generalized slowing but no acute epileptiform activity. Steroids were restarted and escalated to stress dosing with marked improvement in somnolence. Creatinine bumped noted suspected to be 2/2 dehydration. Renal US showing medical renal dx.       Interval History:  No chest pain, no shortness a breath, afebrile.  Patient more perked up today.  Blood pressure stable.  However creatinine bump noted to be persistent since yesterday at 2.3.    Review of Systems  Objective:     Vital Signs (Most Recent):  Temp: 98.2 °F (36.8 °C) (09/04/23 1201)  Pulse: 88 (09/04/23 1201)  Resp: 18 (09/04/23 1201)  BP: 133/64 (09/04/23 1201)  SpO2: 97 % (09/04/23  1201) Vital Signs (24h Range):  Temp:  [97.4 °F (36.3 °C)-98.4 °F (36.9 °C)] 98.2 °F (36.8 °C)  Pulse:  [72-95] 88  Resp:  [16-22] 18  SpO2:  [94 %-97 %] 97 %  BP: (128-149)/(60-92) 133/64     Weight: 64.2 kg (141 lb 8.6 oz)  Body mass index is 24.29 kg/m².    Intake/Output Summary (Last 24 hours) at 9/4/2023 1649  Last data filed at 9/4/2023 0433  Gross per 24 hour   Intake 1120 ml   Output 775 ml   Net 345 ml         Physical Exam      Clear lungs bilaterally, unlabored breathing, on room air, no cyanosis   Heart sounds indicate a regular rate and rhythm   Moves all 4 extremities with limited unchanged range of motion of left lower extremity   Chronic left-sided facial droop  Pleasant, interactive  Awake alert, no acute distress  No obvious lower extremity edema         Assessment/Plan:      * Generalized weakness   MRI and CT which were both negative. Associated w/ worsening confusion at home, sleep disruption, and inability to feed herself; acute onset in 1 week  - possible adrenal crisis at home?  given neg findings for organic workup otherwise  - KERMIT resolved; EEG neg for acute seizures at this time;   - neurology consulted - conservative measures; do not recommend LP- family also declines LP.  - stablevit B 12; so far urine and blood cx's unremarkable  - fall precautions; pt/ot  - Diagnosis of exclusion would be worsening dementia/delirium  -  stress dose steroids (solucortef); deescalate down to home prednisone in 1-2 days    Somnolence  Monitor for improvement w/ steroid dosing  TSH and b12 stable, CT neg; wnl  Fall precautions  Markedly improved after stress dosing steroids initiated  Hold CNS depressing meds       KERMIT (acute kidney injury)  Recurring again; unclear if from dehydration  Submitted IV fluid challenge given charted history of dCHF  Recheck in AM  Renal ultrasound showing chronic medical disease      Heart failure with preserved ejection fraction  Appears stable clinically.     Most recent  echo:   The left ventricle is normal in size with concentric hypertrophy and normal systolic function. The estimated ejection fraction is 65%.   Normal right ventricular size with normal right ventricular systolic function.   Grade II left ventricular diastolic dysfunction.   Severe left atrial enlargement.   Mild-to-moderate mitral regurgitation.   The estimated PA systolic pressure is 43 mmHg.   Normal central venous pressure (3 mmHg).               Plan:  - continue to monitor  - euvolemic  - Avoid salt intake > 2 g/day  - magnesium > 2, potassium > 4  - cardiac diet      Vascular Dementia  Able to answer questions. Appears to be relatively somnolent but rouses to questions. She is accompanied by her daughter at bedside who explains that over the last 2 weeks approximately, she has been having disturbed sleep wake cycle and worsening delirium/delusions episodes.   Doing better with 12.5 mg of seroquel at night    Thrombocytopenia  Appears to be stable. Per PCP Dr. Lee, they were monitoring with periodic CBCs      AF (paroxysmal atrial fibrillation)  Patient with Long standing persistent (>12 months) atrial fibrillation which is uncontrolled currently with previous BB but taken off due to concern for AEs. Patient is currently in sinus rhythm.[id number] Score: 6. Anticoagulation indicated. Anticoagulation done with apixaban 2.5 BID.    Rheumatoid arthritis of multiple sites  Takes mycophenolate and hydroxychloroquine at home for ILD and connective tissue manifestatons of RA  - Continue HCQ, myco  - Solu-Cortef q.8 hours stress dosing, can deescalate down to prednisone in the next 1-2 days        VTE Risk Mitigation (From admission, onward)         Ordered     apixaban tablet 2.5 mg  2 times daily         08/31/23 2153     IP VTE HIGH RISK PATIENT  Once         08/31/23 2153     Place sequential compression device  Until discontinued         08/31/23 2153                Discharge Planning   LETICIA: 9/2/2023      Code Status: Full Code   Is the patient medically ready for discharge?:     Reason for patient still in hospital (select all that apply): Patient trending condition, Laboratory test, Treatment and PT / OT recommendations  Discharge Plan A: Home with family                  Prashanth Pena MD  Department of Hospital Medicine   [facility]

## 2023-09-04 NOTE — ASSESSMENT & PLAN NOTE
Continue meds as prescribed  Caregiver support  Ordered Seroquel to assist with sleep  Discussed proper sleep hygiene

## 2023-09-04 NOTE — PROGRESS NOTES
"[facility]  Medical Home Visit    Visit Date: 8/22/2023  Encounter Provider: Eric Chandra NP  PCP:  Génesis Rosario MD    Subjective:      Patient ID: Selma Lux is a 87 y.o. female.    Chief Complaint: insomnia, increased confusion at night.     Reason for home services: The patient is being seen at home due to a physical debility or recent acute hospitalization that presents a taxing effort to leave the home, to mitigate high risk of hospital readmission or due to the limited availability of reliable or safe options for transportation to the point of access to the provider. The visit meets the criteria for medical necessity as defined by CMS as "health-care services needed to prevent, diagnose, or treat an illness, injury, condition, disease, or its symptoms and that meet accepted standards of medicine." Prior to treatment on this visit the chart was reviewed and patient consent was obtained.    HPI: 87 y.o. female with history of subdural hematoma, drug-induced adrenal insufficiency, thyroid abnormalities, hypertension, previous stroke with residual hemiplegia, COPD, Hashimoto's, cryptogenic organizing pneumonia, RA (on plaquenil and cellcept), hip OA, paroxysmal Afib, vascular dementia, iron deficiency anemia, HTN, HLD.  She recently presented to ED with left sided weakness, general malaise, poor appetite, and nausea/vomiting. She was found to be hypotensive, hypoglycemia, KERMIT, Low Na.  She improved with fluids. Baclofen and neurontin was decreased to only nightly. She was discharged to [facility] and now home with home health.       Today:  Ms. Selma Lux is a 87 y.o. female. Patient is found resting in bed, no acute issues. Recent labs reviewed.  Hyponatremia noted on recent labs which could be caused by coreg. PCP following, will likely wean off coreg.  Daughter voiced concerns of patient not sleeping at night, increased confusion at night.  Daughter is not " getting any sleep, she has to constantly redirect patient throughout the night. Gabapentin and Trazodone not helping her sleep. I recommended starting Seroquel, daughter in agreement.  Went over risk of medication to include risk of death with antipsychotics, she is willing to accept that at this time. Daughter also advised to practice sleep hygiene. VSS. Denies chest pain, SOB, fevers, cough, congestion, change in bladder habits, change in bowl habits.  Reports taking meds as prescribed.  Reports good BM pattern, fair po intake. Instructed to encourage nutrition throughout the day. She requires assistance with ADLs and have great caregiver support in home. No other needs identified at this time.  Patient's daughter is aware of all upcoming appts.     Review of Systems   Constitutional:  Negative for chills and fever.   HENT:  Negative for congestion, postnasal drip and rhinorrhea.    Eyes:  Negative for visual disturbance.   Respiratory:  Negative for chest tightness and shortness of breath.    Cardiovascular:  Negative for chest pain, palpitations and leg swelling.   Gastrointestinal:  Negative for abdominal pain, constipation, diarrhea, nausea and vomiting.   Genitourinary:  Negative for difficulty urinating.   Musculoskeletal:  Positive for gait problem. Negative for arthralgias and myalgias.   Skin:  Negative for color change.   Neurological:  Positive for weakness. Negative for dizziness, light-headedness and headaches.   Hematological:  Does not bruise/bleed easily.   Psychiatric/Behavioral:  Positive for confusion. Negative for agitation.         Insomnia, increased confusion at night       Assessments:  Environmental: adequate lighting and temperature control  Functional Status: assistance with ADL's/IADL's, wheelchair bound, incontinent of bladder  Safety: Fall Precautions, COVID Precautions/Social Distancing/Mask Use  Nutritional: Adequate  Home Health: [facility]  DME/Supplies: wc, walker         Ethical / Legal: Advance Care Planning   Capacity to make medical decisions:  no, Conflict no  Surrogate decision maker:  Name Susu, Relationship: Daughter  Advance Directives:  yes  Living Will: on file  LaPOST:  none  HPOA: none  Code Status:  full code  Will discuss ACP in detail during next visit.         Objective:     Vitals:    08/22/23 1200   BP: 122/74   Pulse: 71   Resp: 20   Temp: 98 °F (36.7 °C)   SpO2: 98%       There is no height or weight on file to calculate BMI.    Physical Exam  HENT:      Head: Normocephalic and atraumatic.      Nose: No congestion or rhinorrhea.      Mouth/Throat:      Mouth: Mucous membranes are moist.   Cardiovascular:      Rate and Rhythm: Normal rate.   Pulmonary:      Effort: No respiratory distress.      Breath sounds: Normal breath sounds.   Abdominal:      General: Bowel sounds are normal.      Palpations: Abdomen is soft.   Musculoskeletal:      Cervical back: Normal range of motion and neck supple.      Right lower leg: No edema.      Left lower leg: No edema.   Skin:     General: Skin is warm and dry.   Neurological:      Mental Status: She is alert. Mental status is at baseline.   Psychiatric:         Mood and Affect: Mood normal.         Plan:          1. Vascular Dementia  Overview:  -neurology assessment 7/30/2018, 8/18/2020  -9/8/2017 Neuropsychiatry note Dr. Lagos    Assessment & Plan:  Continue meds as prescribed  Caregiver support  Ordered Seroquel to assist with sleep  Discussed proper sleep hygiene      2. Hemichorea  Overview:  -see Neurology notes 8/23/2017, 8/30/2017 - ? Related to R caudate infarct. Sx began as a L sided motion disorder.  -followed by neurology previously    Assessment & Plan:  History of stroke  Currently wheelchair bound  Continue home pt/ot      3. Heart failure with preserved ejection fraction, unspecified HF chronicity  Assessment & Plan:  Appears stable  continue meds as prescribed  Caregiver support       4.  Hyponatremia  Assessment & Plan:  PCP following and working on weaning off coreg       5. Rheumatoid arthritis of multiple sites  Overview:  -Dx 2012 with Dr No, then Kiera  HCQ since 2012  Prednisone 5 mg/d since 2012 previously (doses changed at times due to other conditions, pulmonary)  Humira one dose April 2018 followed by ICU admission for pnemonia and resp failure    -Managed by rheumatology      Assessment & Plan:  No acute issues  Continue meds as prescribed       6. Anticoagulant long-term use  Overview:  -PAF - on eliquis, managed by cardiology  -CVA, see neurology assessment 11/2/2022    -patient had been on ASA and Plavix in past. There was confusion concerning the indication for plavix, but this had been recommended in 2018 by cardiology in the setting of L hemichoria thought to be due to lacunar infact in 2017. (2/9/2018 PCP note)  - plavix subsequently stopped (2/5/2020 cariology note, see alson11/20/2019 cardiology note - pat did not want additional AC at that time).   -ASA and brillinta had been recommended by [facility] neurology for vasc dementia - 12/29/2017 note, but brillianta never started.    Assessment & Plan:  Continue eliquis for PAF and CVA      7. Iron deficiency anemia secondary to inadequate dietary iron intake  Assessment & Plan:  Continue iron supplement       8. Age-related physical debility  Assessment & Plan:  Continue caregiver support                Instructions:  - Continue all medications, treatments and therapies as ordered.   - Follow all instructions, recommendations as discussed.  - Maintain Safety Precautions at all times.  - Attend all medical appointments as scheduled.  - For worsening symptoms: call Primary Care Physician or Nurse Practitioner.  - For emergencies, call 911 or immediately report to the nearest emergency room.  - Limit Risks of environmental exposure to coronavirus/COVID-19 as discussed including: social distancing, hand hygiene, and limiting departures  from the home for necessities only.      Follow Up Appointments:   Future Appointments   Date Time Provider Department Center   9/13/2023  1:00 PM Génesis Rosario MD [facility]   10/24/2023 11:00 AM Génesis Rosario MD [facility]   10/27/2023 11:30 AM Lonnie Rouse MD [facility]   2/27/2024  3:00 PM Satya Mckeon [facility] [place]           Signature:       Eric Chandra, MSN, APRN, FNP-C  [facility]    Total face-to-face time was 24 min, >50% of this was spent on counseling and coordination of care. The following issues were discussed: primary and secondary diagnoses, co-morbidities, prescribed medications, treatment modalities, importance of compliance with medical advice and directives for follow-up care

## 2023-09-05 NOTE — ASSESSMENT & PLAN NOTE
Takes mycophenolate and hydroxychloroquine at home for ILD and connective tissue manifestatons of RA  - Continue HCQ, myco  - Solu-Cortef  hours stress dosing - starting deescalation, can deescalate down to prednisone in the next 1-2 days

## 2023-09-05 NOTE — ASSESSMENT & PLAN NOTE
Recurring again; unclear if from dehydration  Improving w/ IVFs back to baseline  Renal ultrasound showing chronic medical disease; CT stone protocol negative for obstruction

## 2023-09-05 NOTE — PLAN OF CARE
Referral for SNF sent to [facility] per pt's daughter's request. Pt was discharged from there 7/4/23. Daughter will follow up with admissions.      09/05/23 0935   Post-Acute Status   Post-Acute Authorization Placement   Post-Acute Placement Status Referrals Sent   Discharge Plan   Discharge Plan A Skilled Nursing Facility   Discharge Plan B Skilled Nursing Facility     Adrien Kolb RN,BSN

## 2023-09-05 NOTE — SUBJECTIVE & OBJECTIVE
Interval History:  No chest pain, no shortness a breath reported.  No abdominal pain. Cr improving. Pt a bit agitated overnight.    Review of Systems  Objective:     Vital Signs (Most Recent):  Temp: 97.7 °F (36.5 °C) (09/05/23 0803)  Pulse: 81 (09/05/23 1136)  Resp: 18 (09/05/23 1005)  BP: (!) 169/75 (09/05/23 0840)  SpO2: 97 % (09/05/23 1005) Vital Signs (24h Range):  Temp:  [97.7 °F (36.5 °C)-98.1 °F (36.7 °C)] 97.7 °F (36.5 °C)  Pulse:  [] 81  Resp:  [18-20] 18  SpO2:  [96 %-99 %] 97 %  BP: (140-178)/(71-92) 169/75     Weight: 64.2 kg (141 lb 8.6 oz)  Body mass index is 24.29 kg/m².    Intake/Output Summary (Last 24 hours) at 9/5/2023 1421  Last data filed at 9/5/2023 0514  Gross per 24 hour   Intake --   Output 2400 ml   Net -2400 ml         Physical Exam      Clear lungs bilaterally, unlabored breathing, on room air, no cyanosis   Awake, alert, talking, intermittently coherent , intermittently disoriented  Chronic left-sided facial droop   Overall 4 extremities, diminished left lower extremity movement   No obvious lower extremity edema   Heart sounds indicated regular rate rhythm  Abd Nondistened

## 2023-09-05 NOTE — PLAN OF CARE
[facility]. PASSR faxed to [phone] and uploaded into [employer].  Will continue to update plan as needed.  Adrien Kolb RN,BSN

## 2023-09-05 NOTE — PROGRESS NOTES
[facility] [address] [place] [address] Medicine  Progress Note    Patient Name: Selma Lux  MRN: [id number]  Patient Class: IP- Inpatient   Admission Date: 8/31/2023  Length of Stay: 4 days  Attending Physician: Prashanth Pena MD  Primary Care Provider: Génesis Rosario MD        Subjective:     Principal Problem:Generalized weakness        HPI:  Patient is a 86 yo F with PMH of vascular dementia, subdural hematoma, adrenal insufficiency, hypertension, COPD, RA with ILD on hydroxychloroquine and mycophenolate, pAF who is presenting with an acute on chronic mental status change. Patient is accompanied by her daughter who provides much of the history although the patient is able to answer questions about ROS.     In the ED,  Patient hypotensive on admit but rapidly corrected without fluid however patient did eventually receive a L of LR and pip/tazo plus vanc for empiric sepsis. Blood cultures drawn and pending. Lab w/u performed in ED revealed stable anemia without leukocytosis. CXR clear. UA not c/w with UTI. MRI and CT head unrevealing for any acute change. Patient did have what appears to be an KERMIT on her CMP although her creatinine has been fluctuating at a higher level than prior to may 2023.       Overview/Hospital Course:  Extended EEGs showed nonspecific generalized slowing but no acute epileptiform activity. Steroids were restarted and escalated to stress dosing with marked improvement in somnolence. Creatinine bumped noted suspected to be 2/2 dehydration. Renal US showing medical renal dx. RN reported possible stone visualization on urinary excretion on 9/4/23; CT stone protocol negative for hydronephrosis or any obstructive nephrolithiasis.       Interval History:  No chest pain, no shortness a breath reported.  No abdominal pain. Cr improving. Pt a bit agitated overnight.    Review of Systems  Objective:     Vital Signs (Most Recent):  Temp: 97.7 °F (36.5 °C) (09/05/23 0803)  Pulse: 81  (09/05/23 1136)  Resp: 18 (09/05/23 1005)  BP: (!) 169/75 (09/05/23 0840)  SpO2: 97 % (09/05/23 1005) Vital Signs (24h Range):  Temp:  [97.7 °F (36.5 °C)-98.1 °F (36.7 °C)] 97.7 °F (36.5 °C)  Pulse:  [] 81  Resp:  [18-20] 18  SpO2:  [96 %-99 %] 97 %  BP: (140-178)/(71-92) 169/75     Weight: 64.2 kg (141 lb 8.6 oz)  Body mass index is 24.29 kg/m².    Intake/Output Summary (Last 24 hours) at 9/5/2023 1421  Last data filed at 9/5/2023 0514  Gross per 24 hour   Intake --   Output 2400 ml   Net -2400 ml         Physical Exam      Clear lungs bilaterally, unlabored breathing, on room air, no cyanosis   Awake, alert, talking, intermittently coherent , intermittently disoriented  Chronic left-sided facial droop   Overall 4 extremities, diminished left lower extremity movement   No obvious lower extremity edema   Heart sounds indicated regular rate rhythm  Abd Nondistened      Assessment/Plan:      * Generalized weakness   MRI and CT which were both negative. Associated w/ worsening confusion at home, sleep disruption, and inability to feed herself; acute onset in 1 week  - possible adrenal crisis at home?  given neg findings for organic workup otherwise  - KERMIT resolved; EEG neg for acute seizures at this time;   - neurology consulted - conservative measures; do not recommend LP- family also declines LP.  - stablevit B 12; so far urine and blood cx's unremarkable  - fall precautions; pt/ot  - Diagnosis of exclusion would be worsening dementia/delirium  -  stress dose steroids (solucortef) down to q12 hours; deescalate down to oral prednisone in 1-2 days    Somnolence  Monitor for improvement w/ steroid dosing  TSH and b12 stable, CT neg; wnl  Fall precautions  Markedly improved after stress dosing steroids initiated  Hold CNS depressing meds       KERMIT (acute kidney injury)  Recurring again; unclear if from dehydration  Improving w/ IVFs back to baseline  Renal ultrasound showing chronic medical disease; CT stone  protocol negative for obstruction      Heart failure with preserved ejection fraction  Appears stable clinically.     Most recent echo:   The left ventricle is normal in size with concentric hypertrophy and normal systolic function. The estimated ejection fraction is 65%.   Normal right ventricular size with normal right ventricular systolic function.   Grade II left ventricular diastolic dysfunction.   Severe left atrial enlargement.   Mild-to-moderate mitral regurgitation.   The estimated PA systolic pressure is 43 mmHg.   Normal central venous pressure (3 mmHg).               Plan:  - continue to monitor  - euvolemic  - Avoid salt intake > 2 g/day  - magnesium > 2, potassium > 4  - cardiac diet      Vascular Dementia  Able to answer questions. Appears to be relatively somnolent but rouses to questions. She is accompanied by her daughter at bedside who explains that over the last 2 weeks approximately, she has been having disturbed sleep wake cycle and worsening delirium/delusions episodes.   Doing better with 12.5 mg of seroquel at night    Thrombocytopenia  Appears to be stable. Per PCP Dr. Lee, they were monitoring with periodic CBCs      AF (paroxysmal atrial fibrillation)  Patient with Long standing persistent (>12 months) atrial fibrillation which is uncontrolled currently with previous BB but taken off due to concern for AEs. Patient is currently in sinus rhythm.[id number] Score: 6. Anticoagulation indicated. Anticoagulation done with apixaban 2.5 BID.    Rheumatoid arthritis of multiple sites  Takes mycophenolate and hydroxychloroquine at home for ILD and connective tissue manifestatons of RA  - Continue HCQ, myco  - Solu-Cortef  hours stress dosing - starting deescalation, can deescalate down to prednisone in the next 1-2 days        VTE Risk Mitigation (From admission, onward)         Ordered     apixaban tablet 2.5 mg  2 times daily         08/31/23 3037     IP VTE HIGH RISK PATIENT  Once          08/31/23 2153     Place sequential compression device  Until discontinued         08/31/23 2153                Discharge Planning   LETICIA: 9/12/2023     Code Status: Full Code   Is the patient medically ready for discharge?:     Reason for patient still in hospital (select all that apply): Patient trending condition, Laboratory test, Treatment and PT / OT recommendations  Discharge Plan A: Skilled Nursing Facility          D/w daughters above plan; willing to try low dose seroquel tonight given recent agitation        Prashanth Pena MD  Department of Hospital Medicine   [facility] - Observation 11H

## 2023-09-06 PROBLEM — R40.0 SOMNOLENCE: Status: RESOLVED | Noted: 2023-01-01 | Resolved: 2023-01-01

## 2023-09-06 PROBLEM — E27.2 ADRENAL CRISIS: Status: ACTIVE | Noted: 2023-01-01

## 2023-09-06 NOTE — ASSESSMENT & PLAN NOTE
Takes mycophenolate and hydroxychloroquine at home for ILD and connective tissue manifestatons of RA  - Continue HCQ, myco; prophy. bactrim  - Solu-Cortef  hours stress dosing - deescalate down to oral prednisone rodger am

## 2023-09-06 NOTE — SUBJECTIVE & OBJECTIVE
Interval History:  Daughters affirmed patient has slept better with Seroquel dosing around 8:00 p.m. last night.  No chest pain or shortness for breath reported today.  Afebrile sitter at the bedside does report that the patient was actually able to hold up a cup to drink.    Review of Systems  Objective:     Vital Signs (Most Recent):  Temp: 98.3 °F (36.8 °C) (09/06/23 1210)  Pulse: 69 (09/06/23 1442)  Resp: 18 (09/06/23 1210)  BP: 137/62 (09/06/23 1210)  SpO2: 96 % (09/06/23 1210) Vital Signs (24h Range):  Temp:  [97.3 °F (36.3 °C)-98.3 °F (36.8 °C)] 98.3 °F (36.8 °C)  Pulse:  [69-85] 69  Resp:  [18] 18  SpO2:  [96 %-100 %] 96 %  BP: (137-164)/(62-70) 137/62     Weight: 64.2 kg (141 lb 8.6 oz)  Body mass index is 24.29 kg/m².    Intake/Output Summary (Last 24 hours) at 9/6/2023 1449  Last data filed at 9/6/2023 0640  Gross per 24 hour   Intake --   Output 1150 ml   Net -1150 ml         Physical Exam      Clear lungs bilaterally, unlabored breathing, on room air, no cyanosis   Awake and alert, intermittently disoriented   Pleasant   Chronic left-sided facial droop   Chronic left lower extremity decreased range of motion   Moving both hands well, did demonstrate 5/5 fist   Heart sounds indicate a regular rate and rhythm   Abd ND

## 2023-09-06 NOTE — ASSESSMENT & PLAN NOTE
Doing better with 12.5 mg of seroquel at night  Can consider depakote during the daytime should pt have problematic delirium episodes

## 2023-09-06 NOTE — PT/OT/SLP PROGRESS
Occupational Therapy   Treatment    Name: Selma Lux  MRN: [id number]  Admitting Diagnosis:  Generalized weakness       Recommendations:     Discharge Recommendations: nursing facility, skilled  Discharge Equipment Recommendations:  to be determined by next level of care  Barriers to discharge:       Assessment:     Selma Lux is a 87 y.o. female with a medical diagnosis of Generalized weakness. Pt going between slight agitation to crying during session but was able to follow commands appropriately for the most part. She was oriented to place and year. While sitting EOB, pt demonstrated spontaneous / uncoordinated movements of her UEs as well as multiple posterior losses of balance.  Performance deficits affecting function are weakness, impaired endurance, decreased coordination, impaired self care skills, decreased upper extremity function, impaired functional mobility, gait instability, impaired balance, decreased safety awareness.     Rehab Prognosis:  Good; patient would benefit from acute skilled OT services to address these deficits and reach maximum level of function.       Plan:     Patient to be seen 3 x/week to address the above listed problems via self-care/home management, therapeutic activities, therapeutic exercises, neuromuscular re-education  Plan of Care Expires: 10/04/23  Plan of Care Reviewed with: patient, caregiver    Subjective     Pain/Comfort:  Pain Rating 1: 0/10    Objective:     Communicated with: rn prior to session.  Patient found supine with parikh catheter, telemetry upon OT entry to room.    General Precautions: Standard, fall    Orthopedic Precautions:N/A  Braces: N/A  Respiratory Status: Room air     Occupational Performance:     Bed Mobility:    Patient completed Scooting/Bridging with minimum assistance and 2 persons  Patient completed Supine to Sit with moderate assistance  Patient completed Sit to Supine with moderate assistance and 2 persons     Functional  Mobility/Transfers:  Not attempted due to inconsistent sitting balance.    Activities of Daily Living:  Grooming: contact guard assistance EOB brushing teeth   with fluctuating assistance needed for balance. Balance improved when pt propped UEs on table.  Lower Body Dressing: maximal assistance    AMPA 6 Click ADL: 13    Treatment & Education:  Pt sat EOB with balance requiring CGA - Max A with spontaneous posterior falling.    Patient left right sidelying with all lines intact and call button in reach    GOALS:   Multidisciplinary Problems       Occupational Therapy Goals          Problem: Occupational Therapy    Goal Priority Disciplines Outcome Interventions   Occupational Therapy Goal     OT, PT/OT Ongoing, Progressing    Description: Goals to be met by: 9/18/23     Patient will increase functional independence with ADLs by performing:    Feeding with Set-up Assistance.  UE Dressing with Minimal Assistance.  LE Dressing with Moderate Assistance.  Grooming while EOB with Stand-by Assistance.  Toileting from bedside commode with Moderate Assistance for hygiene and clothing management.   Toilet transfer to bedside commode with Moderate Assistance.  Upper extremity exercise program x20 reps per handout, with assistance as needed.                         Time Tracking:     OT Date of Treatment: 09/06/23  OT Start Time: 1003  OT Stop Time: 1026  OT Total Time (min): 23 min    Billable Minutes:Self Care/Home Management 23    OT/GENE: OT          9/6/2023

## 2023-09-06 NOTE — NURSING
Pt is resting in bed, tolerated medications well no acute distress noted.Hob elevated. Bed in lowest position,call light within reach. wctm

## 2023-09-06 NOTE — PROGRESS NOTES
[facility] [address] [place] [address] Medicine  Progress Note    Patient Name: Selma Lux  MRN: [id number]  Patient Class: IP- Inpatient   Admission Date: 8/31/2023  Length of Stay: 5 days  Attending Physician: Prashatnh Pena MD  Primary Care Provider: Génesis Rosario MD        Subjective:     Principal Problem:Generalized weakness        HPI:  Patient is a 88 yo F with PMH of vascular dementia, subdural hematoma, adrenal insufficiency, hypertension, COPD, RA with ILD on hydroxychloroquine and mycophenolate, pAF who is presenting with an acute on chronic mental status change. Patient is accompanied by her daughter who provides much of the history although the patient is able to answer questions about ROS.     In the ED,  Patient hypotensive on admit but rapidly corrected without fluid however patient did eventually receive a L of LR and pip/tazo plus vanc for empiric sepsis. Blood cultures drawn and pending. Lab w/u performed in ED revealed stable anemia without leukocytosis. CXR clear. UA not c/w with UTI. MRI and CT head unrevealing for any acute change. Patient did have what appears to be an KERMIT on her CMP although her creatinine has been fluctuating at a higher level than prior to may 2023.       Overview/Hospital Course:  Extended EEGs showed nonspecific generalized slowing but no acute epileptiform activity. Steroids were restarted and escalated to stress dosing with marked improvement in somnolence. Creatinine bumped noted suspected to be 2/2 dehydration which improved w/ IVFs. Renal US showing medical renal dx. RN reported possible stone visualization on urinary excretion on 9/4/23; CT stone protocol negative for hydronephrosis or any obstructive nephrolithiasis. Pt overall sleep wake cycle improved w/ stress dosing of steroids and seroquel hs.       Interval History:  Daughters affirmed patient has slept better with Seroquel dosing around 8:00 p.m. last night.  No chest pain or shortness  for breath reported today.  Afebrile sitter at the bedside does report that the patient was actually able to hold up a cup to drink.    Review of Systems  Objective:     Vital Signs (Most Recent):  Temp: 98.3 °F (36.8 °C) (09/06/23 1210)  Pulse: 69 (09/06/23 1442)  Resp: 18 (09/06/23 1210)  BP: 137/62 (09/06/23 1210)  SpO2: 96 % (09/06/23 1210) Vital Signs (24h Range):  Temp:  [97.3 °F (36.3 °C)-98.3 °F (36.8 °C)] 98.3 °F (36.8 °C)  Pulse:  [69-85] 69  Resp:  [18] 18  SpO2:  [96 %-100 %] 96 %  BP: (137-164)/(62-70) 137/62     Weight: 64.2 kg (141 lb 8.6 oz)  Body mass index is 24.29 kg/m².    Intake/Output Summary (Last 24 hours) at 9/6/2023 1449  Last data filed at 9/6/2023 0640  Gross per 24 hour   Intake --   Output 1150 ml   Net -1150 ml         Physical Exam      Clear lungs bilaterally, unlabored breathing, on room air, no cyanosis   Awake and alert, intermittently disoriented   Pleasant   Chronic left-sided facial droop   Chronic left lower extremity decreased range of motion   Moving both hands well, did demonstrate 5/5 fist   Heart sounds indicate a regular rate and rhythm   Abd ND      Assessment/Plan:      * Generalized weakness   MRI and CT which were both negative. Associated w/ worsening confusion at home, sleep disruption, and inability to feed herself; acute onset in 1 week  - possible adrenal crisis at home?  given neg findings for organic workup otherwise  - KERMIT resolved; EEG neg for acute seizures at this time;   - neurology consulted - conservative measures; do not recommend LP- family also declines LP.  - stablevit B 12; so far urine and blood cx's unremarkable  - fall precautions; pt/ot  - Diagnosis of exclusion would be worsening dementia/delirium  -  stress dose steroids (solucortef) down to q12 hours; deescalate down to oral prednisone rodger AM    KERMIT (acute kidney injury)  Resolved, back to CKD baseline  Renal ultrasound showing chronic medical disease; CT stone protocol negative for  obstruction      Heart failure with preserved ejection fraction  Appears stable clinically.     Most recent echo:  The left ventricle is normal in size with concentric hypertrophy and normal systolic function. The estimated ejection fraction is 65%.  Normal right ventricular size with normal right ventricular systolic function.  Grade II left ventricular diastolic dysfunction.  Severe left atrial enlargement.  Mild-to-moderate mitral regurgitation.  The estimated PA systolic pressure is 43 mmHg.  Normal central venous pressure (3 mmHg).               Plan:  - continue to monitor  - euvolemic  - Avoid salt intake > 2 g/day  - magnesium > 2, potassium > 4  - cardiac diet      Vascular Dementia  Doing better with 12.5 mg of seroquel at night  Can consider depakote during the daytime should pt have problematic delirium episodes    Thrombocytopenia  Appears to be stable. Per PCP Dr. Lee, they were monitoring with periodic CBCs      AF (paroxysmal atrial fibrillation)  Patient with Long standing persistent (>12 months) atrial fibrillation which is uncontrolled currently with previous BB but taken off due to concern for AEs. Patient is currently in sinus rhythm.[id number] Score: 6. Anticoagulation indicated. Anticoagulation done with apixaban 2.5 BID.    Rheumatoid arthritis of multiple sites  Takes mycophenolate and hydroxychloroquine at home for ILD and connective tissue manifestatons of RA  - Continue HCQ, myco  - Solu-Cortef  hours stress dosing - deescalate down to oral prednisone rodger am    Home prophylactic bactrim dosing  VTE Risk Mitigation (From admission, onward)           Ordered     apixaban tablet 2.5 mg  2 times daily         08/31/23 2153     IP VTE HIGH RISK PATIENT  Once         08/31/23 2153     Place sequential compression device  Until discontinued         08/31/23 2153                    Discharge Planning   LETICIA: 9/7/2023     Code Status: Full Code   Is the patient medically ready for discharge?:      Reason for patient still in hospital (select all that apply): Patient trending condition, Treatment and PT / OT recommendations  Discharge Plan A: Skilled Nursing Facility        Med ready to discharge likely [facility].           Prashanth Pena MD  Department of Hospital Medicine   [facility] - Observation 11H

## 2023-09-06 NOTE — PLAN OF CARE
142 received and uploaded into [facility].   Will continue to update plan as needed.  Adrien Kolb RN,BSN

## 2023-09-06 NOTE — ASSESSMENT & PLAN NOTE
Resolved, back to CKD baseline  Renal ultrasound showing chronic medical disease; CT stone protocol negative for obstruction

## 2023-09-06 NOTE — ASSESSMENT & PLAN NOTE
Takes mycophenolate and hydroxychloroquine at home for ILD and connective tissue manifestatons of RA  - Continue HCQ, myco  - Solu-Cortef  hours stress dosing - deescalate down to oral prednisone rodger am

## 2023-09-06 NOTE — PT/OT/SLP PROGRESS
Physical Therapy Treatment  Co-treatment with OT due to acuity of condition, level of skilled assist needed for assessment of safety with mobility.   Patient Name:  Selma Lux   MRN:  [id number]    Recommendations:     Discharge Recommendations: nursing facility, skilled  Discharge Equipment Recommendations:  (TBD pending progress)  Barriers to discharge:  patient below functional baseline    Assessment:     Selma Lux is a 87 y.o. female admitted with a medical diagnosis of Generalized weakness.  She presents with the following impairments/functional limitations: weakness, impaired endurance, impaired self care skills, impaired balance, impaired functional mobility, impaired cognition, decreased coordination, decreased upper extremity function, decreased lower extremity function, decreased ROM, abnormal tone, decreased safety awareness, impaired coordination, impaired fine motor. The patient demo'd impaired coordination of UE/LE/and trunk  with spontaneous athetoid movements of extremities. She also had multiple spontaneous posterior LOB due to increased postural sway in sitting, delayed righting reactions. She sat edge of bed ~15 min with moments of contact guard assist to total assist for trunk support. She is not safe to return home due to risk of falls. Patient confused with delayed command following. She would benefit from SNF placement to address the above deficits and maximize their functional mobility.     Rehab Prognosis: Fair; patient would benefit from acute skilled PT services to address these deficits and reach maximum level of function.    Recent Surgery: * No surgery found *      Plan:     During this hospitalization, patient to be seen 3 x/week to address the identified rehab impairments via gait training, therapeutic activities, therapeutic exercises, neuromuscular re-education and progress toward the following goals:    Plan of Care Expires:  10/04/23    Subjective     Chief  "Complaint: "Nobody is treating me well, I'm so upset"  Patient/Family Comments/goals: "Thank you for helping me" at end of session  Pain/Comfort:  Pain Rating 1: 0/10      Objective:     Communicated with RN prior to session.  Patient found HOB elevated with parikh catheter, telemetry upon PT entry to room. Caregiver at bedside.     General Precautions: Standard, fall  Orthopedic Precautions: N/A  Braces: N/A  Respiratory Status: Room air     Functional Mobility:    Bed Mobility  Rolling to R and L: moderate assistance   Supine to Sit on the R side:  moderate assistance x2  Sit to supine: moderate assistancex 2  Scooting to EOB in sitting: moderate assistance   Scooting laterally in sitting EOB to L: minimum assistance, 2 reps, cued for hand placement and LE progression   Transfers Sit to Stand:  deferred 2/2 patient fatigue, poor sitting balance          AM-PAC 6 CLICK MOBILITY  Turning over in bed (including adjusting bedclothes, sheets and blankets)?: 2  Sitting down on and standing up from a chair with arms (e.g., wheelchair, bedside commode, etc.): 2  Moving from lying on back to sitting on the side of the bed?: 2  Moving to and from a bed to a chair (including a wheelchair)?: 1  Need to walk in hospital room?: 1  Climbing 3-5 steps with a railing?: 1  Basic Mobility Total Score: 9       Treatment & Education:  Patient and caregiver educated on:  -role of therapy  -goals of session  -PT POC  -benefits of out of bed mobility and consequences of immobility  -calling for staff assist to mobilize safely  Patient agreeable to mobilize with therapy.      Sitting edge of bed ~15 minutes, contact guard assist to maximum assistance , weight shift posterior  -Posterior pelvic tilt, kyphosis, cervical flexion, patient with increased postural sway, multiple spontaneous losses of balance posteriorly   -Visual/verbal/manual cues for midline orientation, thoracic and cervical extension  -Patient given graded postural support " to promote core activation, and to elicit patient's righting reactions  -Performed ADLs in sitting with OT, PT facilitating balance        Patient left HOB elevated with all lines intact, call button in reach, bed alarm on, and caregiver present..    GOALS:   Multidisciplinary Problems       Physical Therapy Goals          Problem: Physical Therapy    Goal Priority Disciplines Outcome Goal Variances Interventions   Physical Therapy Goal     PT, PT/OT Ongoing, Progressing     Description: Goals to be met by:      Patient will increase functional independence with mobility by performin. Supine to sit with contact guard Assistance  2. Sit to supine with contact guard Assistance  3. Sit to stand transfer with Minimal Assistance  4. Bed to chair transfer with Moderate Assistance using LRAD.   5. Sitting at edge of bed x10 minutes with Contact Guard Assistance                         Time Tracking:     PT Received On: 23  PT Start Time: 1005     PT Stop Time: 1028  PT Total Time (min): 23 min     Billable Minutes: Therapeutic Activity 13 and Neuromuscular Re-education 10    Treatment Type: Treatment  PT/PTA: PT     Number of PTA visits since last PT visit: 0     2023

## 2023-09-06 NOTE — ASSESSMENT & PLAN NOTE
MRI and CT which were both negative. Associated w/ worsening confusion at home, sleep disruption, and inability to feed herself; acute onset in 1 week  - possible adrenal crisis at home?  given neg findings for organic workup otherwise  - KERMIT resolved; EEG neg for acute seizures at this time;   - neurology consulted - conservative measures; do not recommend LP- family also declines LP.  - stablevit B 12; so far urine and blood cx's unremarkable  - fall precautions; pt/ot  - Diagnosis of exclusion would be worsening dementia/delirium  -  stress dose steroids (solucortef) down to q12 hours; deescalate down to oral prednisone rodger AM

## 2023-09-07 PROBLEM — J44.9 CHRONIC OBSTRUCTIVE PULMONARY DISEASE: Status: ACTIVE | Noted: 2023-01-01

## 2023-09-07 PROBLEM — N17.9 AKI (ACUTE KIDNEY INJURY): Status: RESOLVED | Noted: 2019-03-13 | Resolved: 2023-01-01

## 2023-09-07 PROBLEM — E78.2 MIXED HYPERLIPIDEMIA: Status: ACTIVE | Noted: 2019-10-17

## 2023-09-07 NOTE — CARE UPDATE
Family appealing discharge.      Skyler Slater MD  Attending Physician  Medical Director - [facility] [place] [facility]  Department of Hospital Medicine  9/7/2023

## 2023-09-07 NOTE — PROGRESS NOTES
[facility] [address] [place] [address] Medicine  Progress Note    Patient Name: Selma Lux  MRN: [id number]  Patient Class: IP- Inpatient   Admission Date: 8/31/2023  Length of Stay: 6 days  Attending Physician: Skyler Slater MD  Primary Care Provider: Génesis Rosario MD        Subjective:     Principal Problem:Generalized weakness        HPI:  Patient is a 88 yo F with PMH of vascular dementia, subdural hematoma, adrenal insufficiency, hypertension, COPD, RA with ILD on hydroxychloroquine and mycophenolate, pAF who is presenting with an acute on chronic mental status change. Patient is accompanied by her daughter who provides much of the history although the patient is able to answer questions about ROS.     In the ED,  Patient hypotensive on admit but rapidly corrected without fluid however patient did eventually receive a L of LR and pip/tazo plus vanc for empiric sepsis. Blood cultures drawn and pending. Lab w/u performed in ED revealed stable anemia without leukocytosis. CXR clear. UA not c/w with UTI. MRI and CT head unrevealing for any acute change. Patient did have what appears to be an KERMIT on her CMP although her creatinine has been fluctuating at a higher level than prior to may 2023.       Overview/Hospital Course:  Extended EEGs showed nonspecific generalized slowing but no acute epileptiform activity. Steroids were restarted and escalated to stress dosing with marked improvement in somnolence. Creatinine bumped noted suspected to be 2/2 dehydration which improved w/ IVFs. Renal US showing medical renal dx. RN reported possible stone visualization on urinary excretion on 9/4/23; CT stone protocol negative for hydronephrosis or any obstructive nephrolithiasis. Pt overall sleep wake cycle improved w/ stress dosing of steroids and seroquel hs. Pt continued to do well into 9/7. She was denied by multiple SNFs, so family opted to go home with HH. Pt deemed appropriate for discharge. Plan  discussed with pt, who was agreeable and amenable; medications were discussed and reviewed, outpatient follow-up scheduled, ER precautions were given, all questions were answered to the pt's satisfaction, and Dr. Lux was subsequently discharged.    Update: family appealing discharge.      Interval History: Pt seen and examined this morning on willie DICKINSON. Eager for discharge. Care plan reviewed. Otherwise, doing well and with no further complaints at this time.      Objective:     Vital Signs (Most Recent):  Temp: 98.6 °F (37 °C) (09/07/23 1153)  Pulse: 83 (09/07/23 1153)  Resp: 18 (09/07/23 1153)  BP: (!) 141/58 (09/07/23 1153)  SpO2: 100 % (09/07/23 1153) Vital Signs (24h Range):  Temp:  [97.1 °F (36.2 °C)-98.6 °F (37 °C)] 98.6 °F (37 °C)  Pulse:  [75-83] 83  Resp:  [18-20] 18  SpO2:  [95 %-100 %] 100 %  BP: (132-160)/(58-72) 141/58     Weight: 64.2 kg (141 lb 8.6 oz)  Body mass index is 24.29 kg/m².    Intake/Output Summary (Last 24 hours) at 9/7/2023 1535  Last data filed at 9/7/2023 0800  Gross per 24 hour   Intake --   Output 750 ml   Net -750 ml         Physical Exam  Gen: in NAD, appears stated age  Neuro: intermittently confused, CN2-12 grossly intact BL; motor, sensory, and strength grossly intact BL  HEENT: NTNC, EOMI, PERRLA, MMM; no thyromegaly or lymphadenopathy; no JVD appreciated  CVS: RRR, no m/r/g; S1/S2 auscultated with no S3 or S4; capillary refill < 2 sec  Resp: lungs CTAB, no w/r/r; no belabored breathing or accessory muscle use appreciated   Abd: BS+ in all 4 quadrants; NTND, soft to palpation; no organomegaly appreciated   Extrem: pulses full, equal, and regular over all 4 extremities; no UE or LE edema BL      Significant Labs: All pertinent labs within the past 24 hours have been reviewed.    Significant Imaging: I have reviewed all pertinent imaging results/findings within the past 24 hours.      Assessment/Plan:      * Generalized weakness   MRI and CT which were both negative.  Associated w/ worsening confusion at home, sleep disruption, and inability to feed herself; acute onset in 1 week  - neurology consulted - conservative measures; do not recommend LP- family also declines LP.  - stablevit B 12; so far urine and blood cx's unremarkable  - continue steroid taper  - PT/OT following  - SW/CM enlisted for assistance with discharge planning    Heart failure with preserved ejection fraction  Appears stable clinically.     Most recent echo:   The left ventricle is normal in size with concentric hypertrophy and normal systolic function. The estimated ejection fraction is 65%.   Normal right ventricular size with normal right ventricular systolic function.   Grade II left ventricular diastolic dysfunction.   Severe left atrial enlargement.   Mild-to-moderate mitral regurgitation.   The estimated PA systolic pressure is 43 mmHg.   Normal central venous pressure (3 mmHg).               Plan:  - continue to monitor  - euvolemic  - Avoid salt intake > 2 g/day  - magnesium > 2, potassium > 4  - cardiac diet      Vascular Dementia  Doing better with 12.5 mg of seroquel at night  Can consider depakote during the daytime should pt have problematic delirium episodes    Thrombocytopenia  Appears to be stable. Per PCP Dr. Lee, they were monitoring with periodic CBCs      AF (paroxysmal atrial fibrillation)  Patient with Long standing persistent (>12 months) atrial fibrillation which is uncontrolled currently with previous BB but taken off due to concern for AEs. Patient is currently in sinus rhythm.[id number] Score: 6. Anticoagulation indicated. Anticoagulation done with apixaban 2.5 BID.    Rheumatoid arthritis of multiple sites  Takes mycophenolate and hydroxychloroquine at home for ILD and connective tissue manifestatons of RA  - Continue HCQ, myco; prophy. bactrim  - Solu-Cortef  hours stress dosing - deescalate down to oral prednisone rodger am      VTE Risk Mitigation (From admission,  onward)         Ordered     apixaban tablet 2.5 mg  2 times daily         08/31/23 2153     IP VTE HIGH RISK PATIENT  Once         08/31/23 2153     Place sequential compression device  Until discontinued         08/31/23 2153                Discharge Planning   LETICIA: 9/7/2023     Code Status: Full Code   Is the patient medically ready for discharge?: Yes    Reason for patient still in hospital (select all that apply): Patient trending condition  Discharge Plan A: (P) Skilled Nursing Facility          Skyler Slater MD  Attending Physician  Medical Director - [facility] [place] [facility]  Department of Hospital Medicine  9/7/2023

## 2023-09-07 NOTE — PLAN OF CARE
Problem: Infection  Goal: Absence of Infection Signs and Symptoms  Outcome: Met     Problem: Adult Inpatient Plan of Care  Goal: Plan of Care Review  Outcome: Met  Goal: Patient-Specific Goal (Individualized)  Outcome: Met  Goal: Absence of Hospital-Acquired Illness or Injury  Outcome: Met     Problem: Adjustment to Illness (Delirium)  Goal: Optimal Coping  Outcome: Met     Problem: Altered Behavior (Delirium)  Goal: Improved Behavioral Control  Outcome: Met     Problem: Attention and Thought Clarity Impairment (Delirium)  Goal: Improved Attention and Thought Clarity  Outcome: Met     Problem: Sleep Disturbance (Delirium)  Goal: Improved Sleep  Outcome: Met     Problem: Fluid and Electrolyte Imbalance (Acute Kidney Injury/Impairment)  Goal: Fluid and Electrolyte Balance  Outcome: Met     Problem: Oral Intake Inadequate (Acute Kidney Injury/Impairment)  Goal: Optimal Nutrition Intake  Outcome: Met     Problem: Renal Function Impairment (Acute Kidney Injury/Impairment)  Goal: Effective Renal Function  Outcome: Met     Problem: Impaired Wound Healing  Goal: Optimal Wound Healing  Outcome: Met     Problem: Fall Injury Risk  Goal: Absence of Fall and Fall-Related Injury  Outcome: Met     Problem: Skin Injury Risk Increased  Goal: Skin Health and Integrity  Outcome: Met     Problem: Cognitive Impairment (Dementia Signs/Symptoms)  Goal: Optimized Cognitive Function (Dementia Signs/Symptoms)  Outcome: Met     Problem: Sleep Disturbance (Dementia Signs/Symptoms)  Goal: Improved Sleep (Dementia Signs/Symptoms)  Outcome: Met     Problem: COPD (Chronic Obstructive Pulmonary Disease) Comorbidity  Goal: Maintenance of COPD Symptom Control  Outcome: Met     Problem: Heart Failure Comorbidity  Goal: Maintenance of Heart Failure Symptom Control  Outcome: Met     Problem: Hypertension Comorbidity  Goal: Blood Pressure in Desired Range  Outcome: Met     Problem: Dysrhythmia  Goal: Normalized Cardiac Rhythm  Outcome: Met   Pt  discharged, AVS given,education completed. Pt verbalized understanding. All questions and concerns were met. Per family member.

## 2023-09-07 NOTE — DISCHARGE SUMMARY
[facility] - Observation [address] [place] [address] Medicine  Discharge Summary      Patient Name: Selma Lux  MRN: [id number]  JAZZMINE: [id number]  Patient Class: IP- Inpatient  Admission Date: 8/31/2023  Hospital Length of Stay: 6 days  Discharge Date and Time:  09/07/2023 2:00 PM  Attending Physician: Skyler Slater MD   Discharging Provider: Skyler Slater MD  Primary Care Provider: Génesis Rosario MD  Hospital Medicine Team: [facility] Skyler Slater MD  Primary Care Team: [facility]    HPI:   Patient is a 88 yo F with PMH of vascular dementia, subdural hematoma, adrenal insufficiency, hypertension, COPD, RA with ILD on hydroxychloroquine and mycophenolate, pAF who is presenting with an acute on chronic mental status change. Patient is accompanied by her daughter who provides much of the history although the patient is able to answer questions about ROS.     In the ED,  Patient hypotensive on admit but rapidly corrected without fluid however patient did eventually receive a L of LR and pip/tazo plus vanc for empiric sepsis. Blood cultures drawn and pending. Lab w/u performed in ED revealed stable anemia without leukocytosis. CXR clear. UA not c/w with UTI. MRI and CT head unrevealing for any acute change. Patient did have what appears to be an KERMIT on her CMP although her creatinine has been fluctuating at a higher level than prior to may 2023.       * No surgery found *      Hospital Course:   Extended EEGs showed nonspecific generalized slowing but no acute epileptiform activity. Steroids were restarted and escalated to stress dosing with marked improvement in somnolence. Creatinine bumped noted suspected to be 2/2 dehydration which improved w/ IVFs. Renal US showing medical renal dx. RN reported possible stone visualization on urinary excretion on 9/4/23; CT stone protocol negative for hydronephrosis or any obstructive nephrolithiasis. Pt overall sleep wake cycle improved w/ stress dosing of  steroids and seroquel hs. Pt continued to do well into 9/7. She was denied by multiple SNFs, so family opted to go home with HH. Pt deemed appropriate for discharge. Plan discussed with pt, who was agreeable and amenable; medications were discussed and reviewed, outpatient follow-up scheduled, ER precautions were given, all questions were answered to the pt's satisfaction, and Dr. Lux was subsequently discharged.         Goals of Care Treatment Preferences:  Code Status: Full Code    Living Will: Yes     What is most important right now is to focus on extending life as long as possible, even it it means sacrificing quality, curative/life-prolongation (regardless of treatment burdens).  Accordingly, we have decided that the best plan to meet the patient's goals includes continuing with treatment.      Consults:   Consults (From admission, onward)        Status Ordering Provider     Inpatient consult to Neurology  Once        Provider:  (Not yet assigned)    Completed JUAN JOSE TUCKER          Immunology/Multi System  Rheumatoid arthritis of multiple sites  Takes mycophenolate and hydroxychloroquine at home for ILD and connective tissue manifestatons of RA  - Continue HCQ, myco; prophy. bactrim  - Solu-Cortef  hours stress dosing - deescalate down to oral prednisone rodger am      Final Active Diagnoses:    Diagnosis Date Noted POA    PRINCIPAL PROBLEM:  Generalized weakness [R53.1] 12/30/2021 Yes    Adrenal crisis [E27.2] 09/06/2023 No    Heart failure with preserved ejection fraction [I50.30] 05/15/2023 Yes    Vascular Dementia [F01.A0] 01/10/2023 Yes     Chronic    KERMIT (acute kidney injury) [N17.9] 03/13/2019 Yes    Thrombocytopenia [D69.6] 12/16/2018 Yes     Chronic    AF (paroxysmal atrial fibrillation) [I48.0] 06/29/2018 Yes     Chronic    Rheumatoid arthritis of multiple sites [M05.79] 10/21/2015 Yes     Chronic      Problems Resolved During this Admission:    Diagnosis Date Noted Date Resolved POA     Somnolence [R40.0] 05/13/2023 09/06/2023 No    COPD (chronic obstructive pulmonary disease) [J44.9] 06/20/2022 09/04/2023 Yes     Chronic    Hyperlipidemia [E78.5] 10/17/2019 09/02/2023 Yes     Chronic    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 Schedule an appointment as soon as possible for a visit.    Specialty: Internal Medicine  Contact information:  [facility] [address] [phone]                       Patient Instructions:      Ambulatory referral/consult to Outpatient Case Management   Referral Priority: Routine Referral Type: Consultation   Referral Reason: Specialty Services Required   Number of Visits Requested: 1     Ambulatory referral/consult to Internal Medicine   Standing Status: Future   Referral Priority: Routine Referral Type: Consultation   Referral Reason: Specialty Services Required   Requested Specialty: Internal Medicine   Number of Visits Requested: 1     Diet Cardiac     Notify your health care provider if you experience any of the following:  increased confusion or weakness     Notify your health care provider if you experience any of the following:  persistent dizziness, light-headedness, or visual disturbances     Notify your health care provider if you experience any of the following:  worsening rash     Notify your health care provider if you experience any of the following:  severe persistent headache     Notify your health care provider if you experience any of the following:  difficulty breathing or increased cough     Notify your health care provider if you experience any of the following:  severe uncontrolled pain     Notify your health care provider if you experience any of the  following:  persistent nausea and vomiting or diarrhea     Notify your health care provider if you experience any of the following:  temperature >100.4     Activity as tolerated       Significant Diagnostic Studies: Labs: All labs within the past 24 hours have been reviewed    Pending Diagnostic Studies:     Procedure Component Value Units Date/Time    Methylmalonic acid, serum [[id number]] Collected: 09/02/23 1015    Order Status: Sent Lab Status: In process Updated: 09/02/23 1033    Specimen: Blood     Vitamin B1 [[id number]] Collected: 09/02/23 1015    Order Status: Sent Lab Status: In process Updated: 09/02/23 1033    Specimen: Blood     Vitamin B6 [[id number]] Collected: 09/02/23 1015    Order Status: Sent Lab Status: In process Updated: 09/02/23 1033    Specimen: Blood     Vitamin C [[id number]] Collected: 09/02/23 1015    Order Status: Sent Lab Status: In process Updated: 09/02/23 1033    Specimen: Blood          Medications:  Reconciled Home Medications:      Medication List      CHANGE how you take these medications    predniSONE 10 MG tablet  Commonly known as: DELTASONE  Take 4 tablets (40 mg total) by mouth once daily for 3 days, THEN 3 tablets (30 mg total) once daily for 3 days, THEN 2 tablets (20 mg total) once daily for 3 days, THEN 1 tablet (10 mg total) once daily for 3 days, THEN 0.5 tablets (5 mg total) once daily for 3 days.  Start taking on: September 7, 2023  What changed: See the new instructions.        CONTINUE taking these medications    albuterol-ipratropium 2.5 mg-0.5 mg/3 mL nebulizer solution  Commonly known as: DUO-NEB  Take 3 mLs by nebulization 2 (two) times daily as needed for Shortness of Breath. Rescue     apixaban 2.5 mg Tab  Commonly known as: ELIQUIS  Take 1 tablet (2.5 mg total) by mouth 2 (two) times daily.     atorvastatin 40 MG tablet  Commonly known as: LIPITOR  Take 1 tablet (40 mg total) by mouth every evening.     baclofen 10 MG tablet  Commonly known as: LIORESAL  Take  0.5 tablets (5 mg total) by mouth with lunch AND 0.5 tablets (5 mg total) daily with dinner or evening meal AND 1 tablet (10 mg total) every evening.     cholecalciferol (vitamin D3) 125 mcg (5,000 unit) capsule  Take 1 capsule (5,000 Units total) by mouth once daily.     ferrous sulfate, dried 160 mg (50 mg iron) Tbsr  Commonly known as: SLOW FE  Take 1 tablet (160 mg total) by mouth every other day.     fluticasone-salmeterol 100-50 mcg/dose 100-50 mcg/dose diskus inhaler  Commonly known as: ADVAIR  Inhale 1 puff into the lungs 2 (two) times daily. Controller     * gabapentin 300 MG capsule  Commonly known as: NEURONTIN  Take 1 capsule (300 mg total) by mouth every evening.     * gabapentin 100 MG capsule  Commonly known as: NEURONTIN  Take 1 capsule (100 mg total) by mouth 2 (two) times daily.     gabapentin 5% baclofen 2% amitriptyline 2% topical cream  Apply topically 3 (three) times daily.     hydrOXYchloroQUINE 200 mg tablet  Commonly known as: PLAQUENIL  Take 1 tablet (200 mg total) by mouth 2 (two) times daily.     LIDOcaine 5 %  Commonly known as: LIDODERM  Place 1 patch onto the skin once daily. Remove & Discard patch within 12 hours as needed for pain or as directed by MD Place patch onto lower back as needed     magnesium oxide 400 mg (241.3 mg magnesium) tablet  Commonly known as: MAG-OX  Take 400 mg by mouth once daily.     mycophenolate 500 mg Tab  Commonly known as: CELLCEPT  Take 1 tablet (500 mg total) by mouth 2 (two) times daily.     pantoprazole 40 MG tablet  Commonly known as: PROTONIX  Take 1 tablet (40 mg total) by mouth once daily.     sulfamethoxazole-trimethoprim 800-160mg 800-160 mg Tab  Commonly known as: BACTRIM DS  One tablet by mouth every Monday, Wednesday, Friday to prevent lung infection     thiamine 100 MG tablet  Take 100 mg by mouth once daily.         * This list has 2 medication(s) that are the same as other medications prescribed for you. Read the directions carefully, and  ask your doctor or other care provider to review them with you.            ASK your doctor about these medications    QUEtiapine 25 MG Tab  Commonly known as: SEROQUEL  Take 1 tablet (25 mg total) by mouth nightly as needed (insomnia).            Indwelling Lines/Drains at time of discharge:   Lines/Drains/Airways     NA                Time spent on the discharge of patient: 35 minutes         Skyler Slater MD  Attending Physician  Medical Director - [facility]  Department of Hospital Medicine  9/7/2023

## 2023-09-07 NOTE — PLAN OF CARE
[facility]      HOME HEALTH ORDERS  FACE TO FACE ENCOUNTER    Patient Name: Selma Lux  YOB: 1935    PCP: Génesis Rosario MD   [facility] [address] [facility] [place] [address]  PCP Phone Number: [phone]  PCP Fax: [phone]    Encounter Date: 8/31/23    Admit to Home Health    Diagnoses:  Active Hospital Problems    Diagnosis  POA    *Generalized weakness [R53.1]  Yes    Adrenal crisis [E27.2]  No    Heart failure with preserved ejection fraction [I50.30]  Yes    Vascular Dementia [F01.A0]  Yes     Chronic     -neurology assessment 7/30/2018, 8/18/2020  -9/8/2017 Neuropsychiatry note Dr. Lagos      KERMIT (acute kidney injury) [N17.9]  Yes    Thrombocytopenia [D69.6]  Yes     Chronic     -monitor periodically w/ CBC      AF (paroxysmal atrial fibrillation) [I48.0]  Yes     Chronic     -followed by Cardiology, on Eliquis  -PAF noted on admission circa 4/2/2018 for respiratory failure (PNA +/- )      Rheumatoid arthritis of multiple sites [M05.79]  Yes     Chronic     -Dx 2012 with Dr No, then Corazonm  HCQ since 2012  Prednisone 5 mg/d since 2012 previously (doses changed at times due to other conditions, pulmonary)  Humira one dose April 2018 followed by ICU admission for pnemonia and resp failure    -Managed by rheumatology          Resolved Hospital Problems    Diagnosis Date Resolved POA    Somnolence [R40.0] 09/06/2023 No    COPD (chronic obstructive pulmonary disease) [J44.9] 09/04/2023 Yes     Chronic     - followed by Pulmonary Medicine      Hyperlipidemia [E78.5] 09/02/2023 Yes     Chronic    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 Echo  August 2019 showed decreased LVEF at 40% with some evidence of left-sided heart disease.  12/16/2022 EF 65%, DD.    -The clinical  picture currently is more suggestive of WHO 2 pulmonary hypertension that is pretty mild.  There may be an element of WHO 3 pulmonary hypertension at times of more deranged gas exchange.    -followed in cardiology and Pulmonary Clinics      Hypertension, essential [I10] 09/02/2023 Yes     Chronic       Follow Up Appointments:  Future Appointments   Date Time Provider Department Center   9/13/2023  1:00 PM Génesis Rosario MD [facility] MED CLN [address] [facility]   10/23/2023  8:00 AM Megan Chandra, [facility] [address]   10/24/2023 11:00 AM Génesis Rosario MD [facility] MED CLN [address] [facility]   10/27/2023 11:30 AM Lonnie Rouse MD [facility] RHEUM [address]   2/27/2024  3:00 PM Satya Mckeon OD [facility] [place]       Allergies:Review of patient's allergies indicates:  No Known Allergies    Medications: Review discharge medications with patient and family and provide education.    Current Facility-Administered Medications   Medication Dose Route Frequency Provider Last Rate Last Admin    acetaminophen tablet 1,000 mg  1,000 mg Oral Q8H PRN Marvel Edwards MD   1,000 mg at 09/06/23 2041    acetaminophen tablet 650 mg  650 mg Oral Q4H PRN Marvel Edwards MD        apixaban tablet 2.5 mg  2.5 mg Oral BID Marvel Edwards MD   2.5 mg at 09/07/23 0911    atorvastatin tablet 40 mg  40 mg Oral QHS Marvel Edwards MD   40 mg at 09/06/23 2041    dextrose 10% bolus 125 mL 125 mL  12.5 g Intravenous PRN Marvel Edwards MD        dextrose 10% bolus 250 mL 250 mL  25 g Intravenous PRN Marvel Edwards MD        fluticasone furoate-vilanteroL 100-25 mcg/dose diskus inhaler 1 puff  1 puff Inhalation Daily Marvel Edwards MD   1 puff at 09/07/23 0715    glucagon (human recombinant) injection 1 mg  1 mg Intramuscular PRN Marvel Edwards MD        glucose chewable tablet 16 g  16 g Oral PRN Marvel Edwards MD        glucose chewable tablet 24 g  24 g Oral PRN Marvel Edwards MD        hydrOXYchloroQUINE tablet 200 mg  200 mg Oral BID Unis,  MD Marvel   200 mg at 09/07/23 0911    magnesium oxide tablet 400 mg  400 mg Oral Daily Marvel Edwards MD   400 mg at 09/07/23 0910    melatonin tablet 6 mg  6 mg Oral Nightly PRN Marvel Edwards MD   6 mg at 08/31/23 2250    naloxone 0.4 mg/mL injection 0.02 mg  0.02 mg Intravenous PRN Marvel Edwards MD        ondansetron injection 4 mg  4 mg Intravenous Q8H PRN Prashanth Pena MD   4 mg at 09/03/23 2224    pantoprazole EC tablet 40 mg  40 mg Oral Daily Marvel Edwards MD   40 mg at 09/07/23 0911    polyethylene glycol packet 17 g  17 g Oral BID Marvel Edwards MD   17 g at 09/07/23 0910    [START ON 9/16/2023] predniSONE tablet 10 mg  10 mg Oral Daily Prashanth Pena MD        [START ON 9/13/2023] predniSONE tablet 20 mg  20 mg Oral Daily Prashanth Pena MD        [START ON 9/10/2023] predniSONE tablet 30 mg  30 mg Oral Daily Prashanth Pena MD        predniSONE tablet 40 mg  40 mg Oral Daily Prashanth Pena MD   40 mg at 09/07/23 0911    quetiapine split tablet 12.5 mg  12.5 mg Oral QHS Prashanth Pena MD   12.5 mg at 09/06/23 2041    quetiapine split tablet 12.5 mg  12.5 mg Oral Nightly PRN Prashanth Pena MD        senna-docusate 8.6-50 mg per tablet 1 tablet  1 tablet Oral BID Marvel Edwards MD   1 tablet at 09/07/23 0910    sodium chloride 0.9% flush 10 mL  10 mL Intravenous PRN Marvel Edwards MD        sulfamethoxazole-trimethoprim 400-80mg per tablet 1 tablet  1 tablet Oral BID Prashanth Pena MD   1 tablet at 09/07/23 0900    trazodone split tablet 25 mg  25 mg Oral Nightly PRN Sisi Romero PA-C   25 mg at 09/01/23 0154     Current Discharge Medication List        CONTINUE these medications which have CHANGED    Details   predniSONE (DELTASONE) 10 MG tablet Take 4 tablets (40 mg total) by mouth once daily for 3 days, THEN 3 tablets (30 mg total) once daily for 3 days, THEN 2 tablets (20 mg total) once daily for 3 days, THEN 1 tablet (10 mg total) once daily for 3 days, THEN  0.5 tablets (5 mg total) once daily for 3 days.  Qty: 32 tablet, Refills: 0           CONTINUE these medications which have NOT CHANGED    Details   albuterol-ipratropium (DUO-NEB) 2.5 mg-0.5 mg/3 mL nebulizer solution Take 3 mLs by nebulization 2 (two) times daily as needed for Shortness of Breath. Rescue  Qty: 75 mL, Refills: 2      apixaban (ELIQUIS) 2.5 mg Tab Take 1 tablet (2.5 mg total) by mouth 2 (two) times daily.  Qty: 180 tablet, Refills: 3      atorvastatin (LIPITOR) 40 MG tablet Take 1 tablet (40 mg total) by mouth every evening.  Qty: 90 tablet, Refills: 3      baclofen (LIORESAL) 10 MG tablet Take 0.5 tablets (5 mg total) by mouth with lunch AND 0.5 tablets (5 mg total) daily with dinner or evening meal AND 1 tablet (10 mg total) every evening.  Qty: 180 tablet, Refills: 3    Associated Diagnoses: Spondylosis of cervical region without myelopathy or radiculopathy; Spondylosis of lumbar region without myelopathy or radiculopathy; Spinal stenosis, unspecified spinal region      cholecalciferol, vitamin D3, 125 mcg (5,000 unit) capsule Take 1 capsule (5,000 Units total) by mouth once daily.  Qty: 90 capsule, Refills: 3    Associated Diagnoses: Osteopenia, unspecified location; Long term systemic steroid user      ferrous sulfate, dried (SLOW FE) 160 mg (50 mg iron) TbSR Take 1 tablet (160 mg total) by mouth every other day.      fluticasone-salmeterol diskus inhaler 100-50 mcg Inhale 1 puff into the lungs 2 (two) times daily. Controller  Qty: 1 each, Refills: 2      !! gabapentin (NEURONTIN) 100 MG capsule Take 1 capsule (100 mg total) by mouth 2 (two) times daily.  Qty: 60 capsule, Refills: 6      gabapentin 5% baclofen 2% amitriptyline 2% topical cream Apply topically 3 (three) times daily.  Qty: 240 g, Refills: 2      hydrOXYchloroQUINE (PLAQUENIL) 200 mg tablet Take 1 tablet (200 mg total) by mouth 2 (two) times daily.  Qty: 180 tablet, Refills: 11    Comments: Resume 02/12/22- 20d following covid  infection  Associated Diagnoses: Seropositive rheumatoid arthritis of multiple sites      LIDOcaine (LIDODERM) 5 % Place 1 patch onto the skin once daily. Remove & Discard patch within 12 hours as needed for pain or as directed by MD Place patch onto lower back as needed  Qty: 60 patch, Refills: 11    Associated Diagnoses: Lumbar spondylosis      magnesium oxide (MAG-OX) 400 mg (241.3 mg magnesium) tablet Take 400 mg by mouth once daily.      mycophenolate (CELLCEPT) 500 mg Tab Take 1 tablet (500 mg total) by mouth 2 (two) times daily.  Qty: 180 tablet, Refills: 3    Comments: YOUR PATIENT HAS REQUESTED A REFILL OF THIS MEDICATION, PREVIOUSLY AUTHORIZED BY ANOTHER PRESCRIBER.  Associated Diagnoses: Cryptogenic organizing pneumonia      pantoprazole (PROTONIX) 40 MG tablet Take 1 tablet (40 mg total) by mouth once daily.  Qty: 30 tablet, Refills: 5      thiamine 100 MG tablet Take 100 mg by mouth once daily.      !! gabapentin (NEURONTIN) 300 MG capsule Take 1 capsule (300 mg total) by mouth every evening.  Qty: 30 capsule, Refills: 6      QUEtiapine (SEROQUEL) 25 MG Tab Take 1 tablet (25 mg total) by mouth nightly as needed (insomnia).  Qty: 30 tablet, Refills: 11    Associated Diagnoses: Insomnia, unspecified type      sulfamethoxazole-trimethoprim 800-160mg (BACTRIM DS) 800-160 mg Tab One tablet by mouth every Monday, Wednesday, Friday to prevent lung infection  Qty: 36 tablet, Refills: 3    Associated Diagnoses: High risk medication use       !! - Potential duplicate medications found. Please discuss with provider.            I have seen and examined this patient within the last 30 days. My clinical findings that support the need for the home health skilled services and home bound status are the following:no   Weakness/numbness causing balance and gait disturbance due to Weakness/Debility making it taxing to leave home.  Requiring assistive device to leave home due to unsteady gait caused by  Weakness/Debility.      Diet:   cardiac diet    Labs:  Report Lab results to PCP.    Referrals/ Consults  Physical Therapy to evaluate and treat. Evaluate for home safety and equipment needs; Establish/upgrade home exercise program. Perform / instruct on therapeutic exercises, gait training, transfer training, and Range of Motion.  Occupational Therapy to evaluate and treat. Evaluate home environment for safety and equipment needs. Perform/Instruct on transfers, ADL training, ROM, and therapeutic exercises.  Aide to provide assistance with personal care, ADLs, and vital signs.    Activities:   activity as tolerated    Nursing:   Agency to admit patient within 24 hours of hospital discharge unless specified on physician order or at patient request    SN to complete comprehensive assessment including routine vital signs. Instruct on disease process and s/s of complications to report to MD. Review/verify medication list sent home with the patient at time of discharge  and instruct patient/caregiver as needed. Frequency may be adjusted depending on start of care date.     Skilled nurse to perform up to 3 visits PRN for symptoms related to diagnosis    Notify MD if SBP > 160 or < 90; DBP > 90 or < 50; HR > 120 or < 50; Temp > 101; O2 < 88%    Ok to schedule additional visits based on staff availability and patient request on consecutive days within the home health episode.    When multiple disciplines ordered:    Start of Care occurs on Sunday - Wednesday schedule remaining discipline evaluations as ordered on separate consecutive days following the start of care.    Thursday SOC -schedule subsequent evaluations Friday and Monday the following week.     Friday - Saturday SOC - schedule subsequent discipline evaluations on consecutive days starting Monday of the following week.    For all post-discharge communication and subsequent orders please contact patient's primary care physician.     Miscellaneous   Routine Skin for Bedridden  Patients: Instruct patient/caregiver to apply moisture barrier cream to all skin folds and wet areas in perineal area daily and after baths and all bowel movements.    Home Health Aide:  Nursing Three times weekly, Physical Therapy Three times weekly, Occupational Therapy Three times weekly, and Home Health Aide Three times weekly    Wound Care Orders  no    I certify that this patient is confined to her home and needs intermittent skilled nursing care, physical therapy, and occupational therapy.        Skyler Slater MD  Attending Physician  Medical Director - [facility]  Department of Hospital Medicine  9/7/2023

## 2023-09-07 NOTE — SUBJECTIVE & OBJECTIVE
Interval History: Pt seen and examined this morning on willie DICKINSON. Eager for discharge. Care plan reviewed. Otherwise, doing well and with no further complaints at this time.      Objective:     Vital Signs (Most Recent):  Temp: 98.6 °F (37 °C) (09/07/23 1153)  Pulse: 83 (09/07/23 1153)  Resp: 18 (09/07/23 1153)  BP: (!) 141/58 (09/07/23 1153)  SpO2: 100 % (09/07/23 1153) Vital Signs (24h Range):  Temp:  [97.1 °F (36.2 °C)-98.6 °F (37 °C)] 98.6 °F (37 °C)  Pulse:  [75-83] 83  Resp:  [18-20] 18  SpO2:  [95 %-100 %] 100 %  BP: (132-160)/(58-72) 141/58     Weight: 64.2 kg (141 lb 8.6 oz)  Body mass index is 24.29 kg/m².    Intake/Output Summary (Last 24 hours) at 9/7/2023 1535  Last data filed at 9/7/2023 0800  Gross per 24 hour   Intake --   Output 750 ml   Net -750 ml         Physical Exam  Gen: in NAD, appears stated age  Neuro: intermittently confused, CN2-12 grossly intact BL; motor, sensory, and strength grossly intact BL  HEENT: NTNC, EOMI, PERRLA, MMM; no thyromegaly or lymphadenopathy; no JVD appreciated  CVS: RRR, no m/r/g; S1/S2 auscultated with no S3 or S4; capillary refill < 2 sec  Resp: lungs CTAB, no w/r/r; no belabored breathing or accessory muscle use appreciated   Abd: BS+ in all 4 quadrants; NTND, soft to palpation; no organomegaly appreciated   Extrem: pulses full, equal, and regular over all 4 extremities; no UE or LE edema BL      Significant Labs: All pertinent labs within the past 24 hours have been reviewed.    Significant Imaging: I have reviewed all pertinent imaging results/findings within the past 24 hours.

## 2023-09-07 NOTE — NURSING
Pt is AAOx1. No distress noted. Pt has weakness in both arms and legs, Hill intact.   Call light in reach. Bed in low locked position.   Side rails x2. Belongings at bedside. Pt free of fall and injuries Questions and concerns voiced and answered.  Plan of care continues.

## 2023-09-07 NOTE — ASSESSMENT & PLAN NOTE
MRI and CT which were both negative. Associated w/ worsening confusion at home, sleep disruption, and inability to feed herself; acute onset in 1 week  - neurology consulted - conservative measures; do not recommend LP- family also declines LP.  - stablevit B 12; so far urine and blood cx's unremarkable  - continue steroid taper  - PT/OT following  - SW/CM enlisted for assistance with discharge planning

## 2023-09-07 NOTE — CONSULTS
Vascular access assessment completed. Patient found to have working vascular access obtained by primary nurse. Please reconsult if vascular access need changes.

## 2023-09-07 NOTE — PLAN OF CARE
Pt declined by [facility]  Rsoy requested referrals sent to [facility]  Adrien Kolb RN,BSN    1400: Pt denied by [facility] no beds. Contacted pt's daughter to inform. She is calling [facility] to appeal their decision. Dr Slater notified.  Will continue to update plan as needed.  1535: TC to Pt's daughter  Rosy, she has not been able to reach admissions. She requested Dr Slater call. Notified Dr Slater via secure chat. More referrals sent out via [facility].   Adrien Kolb, RN,BSN

## 2023-09-07 NOTE — PLAN OF CARE
Problem: Adult Inpatient Plan of Care  Goal: Optimal Comfort and Wellbeing  Outcome: Ongoing, Progressing  Goal: Readiness for Transition of Care  Outcome: Ongoing, Progressing     Problem: Dysrhythmia  Goal: Normalized Cardiac Rhythm  Outcome: Ongoing, Progressing     Problem: COPD (Chronic Obstructive Pulmonary Disease) Comorbidity  Goal: Maintenance of COPD Symptom Control  Outcome: Ongoing, Progressing     Problem: Heart Failure Comorbidity  Goal: Maintenance of Heart Failure Symptom Control  Outcome: Ongoing, Progressing     Problem: Mobility Impairment  Goal: Optimal Mobility  Outcome: Ongoing, Progressing     Problem: Behavior Regulation Impairment (Dementia Signs/Symptoms)  Goal: Improved Behavioral Control (Dementia Signs/Symptoms)  Outcome: Ongoing, Progressing     Problem: Cognitive Impairment (Dementia Signs/Symptoms)  Goal: Optimized Cognitive Function (Dementia Signs/Symptoms)  Outcome: Ongoing, Progressing     Problem: Mood Impairment (Dementia Signs/Symptoms)  Goal: Improved Mood Symptoms (Dementia Signs/Symptoms)  Outcome: Ongoing, Progressing     Problem: Nutrition Imbalance (Dementia Signs/Symptoms)  Goal: Optimized Nutrition Intake (Dementia Signs/Symptoms)  Outcome: Ongoing, Progressing     Problem: Sleep Disturbance (Dementia Signs/Symptoms)  Goal: Improved Sleep (Dementia Signs/Symptoms)  Outcome: Ongoing, Progressing     Problem: Social or Functional Impairment (Dementia Signs/Symptoms)  Goal: Enhanced Social or Functional Skills and Ability (Dementia Signs/Symptoms)  Outcome: Ongoing, Progressing     Pt progressing towards goals. No distress notice. No falls or injuries during shift. Bed in lowest position, call light within reach, belonging at bedside. Safety precaution maintain.Plan of Care reviewed. Pt  don't understands as well will reinforce.

## 2023-09-08 NOTE — NURSING
Pt is AAOx2. No distress noted. Pt has dementia. Pt can answer a few question but forget quickly. Stable no complaint. Call light in reach. Bed in low locked position. Side rails x2. Belongings at bedside. Pt free of fall and injuries Questions and concerns voiced and answered.  Plan of care continues.

## 2023-09-08 NOTE — PROGRESS NOTES
[facility] [address] [place] [address] Medicine  Progress Note    Patient Name: Selma Lux  MRN: [id number]  Patient Class: IP- Inpatient   Admission Date: 8/31/2023  Length of Stay: 7 days  Attending Physician: Skyler Slater MD  Primary Care Provider: Génesis Rosario MD        Subjective:     Principal Problem:Generalized weakness        HPI:  Patient is a 88 yo F with PMH of vascular dementia, subdural hematoma, adrenal insufficiency, hypertension, COPD, RA with ILD on hydroxychloroquine and mycophenolate, pAF who is presenting with an acute on chronic mental status change. Patient is accompanied by her daughter who provides much of the history although the patient is able to answer questions about ROS.     In the ED,  Patient hypotensive on admit but rapidly corrected without fluid however patient did eventually receive a L of LR and pip/tazo plus vanc for empiric sepsis. Blood cultures drawn and pending. Lab w/u performed in ED revealed stable anemia without leukocytosis. CXR clear. UA not c/w with UTI. MRI and CT head unrevealing for any acute change. Patient did have what appears to be an KERMIT on her CMP although her creatinine has been fluctuating at a higher level than prior to may 2023.       Overview/Hospital Course:  Extended EEGs showed nonspecific generalized slowing but no acute epileptiform activity. Steroids were restarted and escalated to stress dosing with marked improvement in somnolence. Creatinine bumped noted suspected to be 2/2 dehydration which improved w/ IVFs. Renal US showing medical renal dx. RN reported possible stone visualization on urinary excretion on 9/4/23; CT stone protocol negative for hydronephrosis or any obstructive nephrolithiasis. Pt overall sleep wake cycle improved w/ stress dosing of steroids and seroquel hs. Pt continued to do well into 9/7. She was denied by multiple SNFs, so family opted to go home with HH. Pt deemed appropriate for discharge. Plan  discussed with pt, who was agreeable and amenable; medications were discussed and reviewed, outpatient follow-up scheduled, ER precautions were given, all questions were answered to the pt's satisfaction, and Dr. Lux was subsequently discharged.    Update: family appealing discharge, stating they need SNF and cannot accommodate at home with caregivers on the weekend.      Interval History: Pt seen and examined this morning on rounds. MARIA EFIONA. Remains eager for discharge. Care plan reviewed. Otherwise, doing well and with no further complaints at this time.      Objective:     Vital Signs (Most Recent):  Temp: 98.6 °F (37 °C) (09/08/23 1135)  Pulse: 83 (09/08/23 1135)  Resp: 18 (09/08/23 1135)  BP: (!) 169/73 (09/08/23 1135)  SpO2: 98 % (09/08/23 1135) Vital Signs (24h Range):  Temp:  [97.9 °F (36.6 °C)-98.6 °F (37 °C)] 98.6 °F (37 °C)  Pulse:  [42-83] 83  Resp:  [18-20] 18  SpO2:  [96 %-100 %] 98 %  BP: (136-169)/(64-73) 169/73     Weight: 64.2 kg (141 lb 8.6 oz)  Body mass index is 24.29 kg/m².    Intake/Output Summary (Last 24 hours) at 9/8/2023 1340  Last data filed at 9/8/2023 0511  Gross per 24 hour   Intake 480 ml   Output --   Net 480 ml         Physical Exam  Gen: in NAD, appears stated age  Neuro: intermittently confused, CN2-12 grossly intact BL; motor, sensory, and strength grossly intact BL  HEENT: NTNC, EOMI, PERRLA, MMM; no thyromegaly or lymphadenopathy; no JVD appreciated  CVS: RRR, no m/r/g; S1/S2 auscultated with no S3 or S4; capillary refill < 2 sec  Resp: lungs CTAB, no w/r/r; no belabored breathing or accessory muscle use appreciated   Abd: BS+ in all 4 quadrants; NTND, soft to palpation; no organomegaly appreciated   Extrem: pulses full, equal, and regular over all 4 extremities; no UE or LE edema BL      Significant Labs: All pertinent labs within the past 24 hours have been reviewed.    Significant Imaging: I have reviewed all pertinent imaging results/findings within the past 24  hours.      Assessment/Plan:      * Generalized weakness   MRI and CT which were both negative. Associated w/ worsening confusion at home, sleep disruption, and inability to feed herself; acute onset in 1 week  - neurology consulted - conservative measures; do not recommend LP- family also declines LP.  - stablevit B 12; so far urine and blood cx's unremarkable  - continue steroid taper  - PT/OT following  - SW/CM enlisted for assistance with discharge planning    Heart failure with preserved ejection fraction  Appears stable clinically.     Most recent echo:   The left ventricle is normal in size with concentric hypertrophy and normal systolic function. The estimated ejection fraction is 65%.   Normal right ventricular size with normal right ventricular systolic function.   Grade II left ventricular diastolic dysfunction.   Severe left atrial enlargement.   Mild-to-moderate mitral regurgitation.   The estimated PA systolic pressure is 43 mmHg.   Normal central venous pressure (3 mmHg).               Plan:  - continue to monitor  - euvolemic  - Avoid salt intake > 2 g/day  - magnesium > 2, potassium > 4  - cardiac diet      Vascular Dementia  Doing better with 12.5 mg of seroquel at night  Can consider depakote during the daytime should pt have problematic delirium episodes    Mixed hyperlipidemia  Continue atorvastatin      Thrombocytopenia  Appears to be stable. Per PCP Dr. Lee, they were monitoring with periodic CBCs      AF (paroxysmal atrial fibrillation)  Patient with Long standing persistent (>12 months) atrial fibrillation which is uncontrolled currently with previous BB but taken off due to concern for AEs. Patient is currently in sinus rhythm.[id number] Score: 6. Anticoagulation indicated. Anticoagulation done with apixaban 2.5 BID.    Rheumatoid arthritis of multiple sites  Takes mycophenolate and hydroxychloroquine at home for ILD and connective tissue manifestatons of RA  - Continue HCQ,  myco; prophy. bactrim  - Solu-Cortef  hours stress dosing - deescalate down to oral prednisone rodger am      VTE Risk Mitigation (From admission, onward)         Ordered     apixaban tablet 2.5 mg  2 times daily         08/31/23 2153     IP VTE HIGH RISK PATIENT  Once         08/31/23 2153     Place sequential compression device  Until discontinued         08/31/23 2153                Discharge Planning   LETICIA: 9/8/2023     Code Status: Full Code   Is the patient medically ready for discharge?: Yes    Reason for patient still in hospital (select all that apply): Patient trending condition  Discharge Plan A: (P) Skilled Nursing Facility          Skyler Slater MD  Attending Physician  Medical Director - [facility]  Department of Hospital Medicine  9/8/2023

## 2023-09-08 NOTE — PROGRESS NOTES
Pt's daughter, Dr. Sethi, called SW and reported that pt is still in hospital but according to attending physician is ready for d/c.  CG expressed concern about not being able to care for pt at home and noted that they have had difficulty finding a SNF that will take pt.  She said some of the SNF referrals have outright denied pt and others don't have current bed availability. SW suggested discussing inpatient respite with their hospital  as an option to bridge the gap between d/c and SNF.  CG verbalized understanding and agreed.  SW provided phone number to [facility] and remains available.

## 2023-09-08 NOTE — PLAN OF CARE
Problem: Adult Inpatient Plan of Care  Goal: Optimal Comfort and Wellbeing  Outcome: Ongoing, Progressing  Goal: Readiness for Transition of Care  Outcome: Ongoing, Progressing     Problem: Dysrhythmia  Goal: Normalized Cardiac Rhythm  Outcome: Ongoing, Progressing     Problem: COPD (Chronic Obstructive Pulmonary Disease) Comorbidity  Goal: Maintenance of COPD Symptom Control  Outcome: Ongoing, Progressing     Problem: Heart Failure Comorbidity  Goal: Maintenance of Heart Failure Symptom Control  Outcome: Ongoing, Progressing     Problem: Mobility Impairment  Goal: Optimal Mobility  Outcome: Ongoing, Progressing     Problem: Behavior Regulation Impairment (Dementia Signs/Symptoms)  Goal: Improved Behavioral Control (Dementia Signs/Symptoms)  Outcome: Ongoing, Progressing     Problem: Cognitive Impairment (Dementia Signs/Symptoms)  Goal: Optimized Cognitive Function (Dementia Signs/Symptoms)  Outcome: Ongoing, Progressing     Problem: Mood Impairment (Dementia Signs/Symptoms)  Goal: Improved Mood Symptoms (Dementia Signs/Symptoms)  Outcome: Ongoing, Progressing     Problem: Nutrition Imbalance (Dementia Signs/Symptoms)  Goal: Optimized Nutrition Intake (Dementia Signs/Symptoms)  Outcome: Ongoing, Progressing     Problem: Sleep Disturbance (Dementia Signs/Symptoms)  Goal: Improved Sleep (Dementia Signs/Symptoms)  Outcome: Ongoing, Progressing     Problem: Social or Functional Impairment (Dementia Signs/Symptoms)  Goal: Enhanced Social or Functional Skills and Ability (Dementia Signs/Symptoms)  Outcome: Ongoing, Progressing   Pt progressing towards goals.pt has voided since kati lepe yesterday.oriented to self.follows simple commands.converses well with daughters.awaiting on placement.safety precautions maintained.bed in low position.rails up.call bell in reach.bed alarm in use for pt safety.having to round frequently.charting outside of pts room for extra visualization and safety.continue skin care.barrier cream to  buttocks and heels.continue plan of care.

## 2023-09-08 NOTE — PHYSICIAN QUERY
PT Name: Selma Lux  MR #: [id number]     DOCUMENTATION CLARIFICATION     CDS/:  Shawn Marques, RN, CDS                   Contact Information:  [email or web address]    This form is a permanent document in the medical record.     Query Date: September 8, 2023    By submitting this query, we are merely seeking further clarification of documentation.  Please utilize your independent clinical judgment when addressing the question(s) below.    The Medical Record contains the following   Indicators Supporting Clinical Findings Location in Medical Record   X Heart Failure documented Heart failure with preserved ejection fraction    Submitted IV fluid challenge given charted history of dCHF    PN 9/4          BNP     X EF/Echo Most recent echo:  - The left ventricle is normal in size with concentric hypertrophy and normal systolic function. The estimated ejection fraction is 65%.  - Normal right ventricular size with normal right ventricular systolic function.  - Grade II left ventricular diastolic dysfunction.  - Severe left atrial enlargement.  - Mild-to-moderate mitral regurgitation.  - The estimated PA systolic pressure is 43 mmHg.  - Normal central venous pressure (3 mmHg).    PN 9/7    Radiology findings      Subjective/Objective Respiratory Conditions      Recent/Current MI      Heart Transplant, LVAD     X Edema, JVD No LE edema  PN 9/4      Ascites      Diuretics/Meds     X Other Treatment Plan:  - continue to monitor  - euvolemic  - Avoid salt intake > 2 g/day  - magnesium > 2, potassium > 4  - cardiac diet    PN 9/7   X Other Past Medical History: Chronic systolic (congestive) heart failure 8/6/2021 Neuro Consult 9/2         Heart failure is a clinical diagnosis which includes symptomatic fluid retention, elevated intracardiac pressures, and/or the inability of the heart to deliver adequate blood flow.    Heart Failure with reduced Ejection Fraction (HFrEF) or Systolic Heart  "Failure (loses ability to contract normally, EF is <40%)    Heart Failure with preserved Ejection Fraction (HFpEF) or Diastolic Heart Failure (stiff ventricles, does not relax properly, EF is >50%)     Heart Failure with Combined Systolic and Diastolic Failure (stiff ventricles, does not relax properly and EF is <50%)    Mid-range or mildly reduced ejection fraction (HFmrEF) is classified as systolic heart failure.  Congestive heart failure with a recovered EF is classified as Diastolic Heart Failure.  Common clues to acute exacerbation:  Rapidly progressive symptoms (w/in 2 weeks of presentation), using IV diuretics, using supplemental O2, pulmonary edema on Xray, new or worsening pleural effusion, +JVD or other signs of volume overload, MI w/in 4 weeks, and/or BNP >500  The clinical guidelines noted are only system guidelines, and do not replace the providers clinical judgment.    Provider, please clarify the diagnosis of "Heart Failure" associated with the above clinical findings.    [  x ]  Chronic Systolic Heart Failure (HFrEF or HFmrEF) - preexisting and stable     [   ]  Chronic Diastolic Heart Failure (HFpEF) - preexisting and stable     [   ]  Chronic Combined Systolic and Diastolic Heart Failure - pre-existing and stable     [   ]  Heart Failure Ruled Out     [   ]  Other (please specify): ___________________________________         Please document in your progress notes daily for the duration of treatment until resolved and include in your discharge summary.    References:  American Heart Association editorial staff. (2017, May). Ejection Fraction Heart Failure Measurement. American Heart Association. https://www.heart.org/en/health-topics/heart-failure/diagnosing-heart-failure/ejection-fraction-heart-failure-measurement#:~:text=Ejection%20fraction%20(EF)%20is%20a,pushed%20out%20with%20each%20heartbeDELL Lion (2020, December 15). Heart failure with preserved ejection fraction: Clinical " manifestations and diagnosis. UpToDate. https://www.[employer].com/contents/heart-failure-with-preserved-ejection-fraction-clinical-manifestations-and-diagnosis.  ICD-10-CM/PCS Coding Clinic Third Quarter ICD-10, Effective with discharges: September 8, 2020 [facility] § Heart failure with mid-range or mildly reduced ejection fraction (2020).  ICD-10-CM/PCS Coding Clinic Third Quarter ICD-10, Effective with discharges: September 8, 2020 [facility] § Heart failure with recovered ejection fraction (2020).  Form No. [id number]

## 2023-09-08 NOTE — PT/OT/SLP PROGRESS
"Occupational Therapy   Treatment    Name: Selma Lux  MRN: [id number]  Admitting Diagnosis:  Generalized weakness       Recommendations:     Discharge Recommendations: nursing facility, skilled  Discharge Equipment Recommendations:  to be determined by next level of care  Barriers to discharge:       Assessment:     Selma Lux is a 87 y.o. female with a medical diagnosis of Generalized weakness.   Pt noted with impaired balance this date while seated EOB for grooming and oral hygiene. Pt required significant assistance this date 2/2 to impaired balance. Pt easily distractible, with difficulty attending to tasks  2/2 to baseline cognition requiring frequent redirection.  At this time, Pt's impaired balance and generalized weakness limit occupational performance. She presents with deficits listed below. Performance deficits affecting function are weakness, impaired endurance, impaired self care skills, impaired functional mobility, impaired cognition, impaired balance, decreased upper extremity function, decreased lower extremity function, impaired coordination, decreased coordination.     Rehab Prognosis:  Good; patient would benefit from acute skilled OT services to address these deficits and reach maximum level of function.       Plan:     Patient to be seen 3 x/week to address the above listed problems via self-care/home management, therapeutic activities, therapeutic exercises, neuromuscular re-education  Plan of Care Expires: 10/04/23  Plan of Care Reviewed with: patient    Subjective     Chief Complaint: " Im so tired"  Patient/Family Comments/goals: Return to bed  Pain/Comfort:  Pain Rating 1: 0/10    Objective:     Communicated with: MARILYN Pike prior to session.  Patient found HOB elevated with PureWick, telemetry, bed alarm upon OT entry to room.    General Precautions: Standard, fall    Orthopedic Precautions:N/A  Braces: N/A  Respiratory Status: Room air     Occupational Performance: "     Bed Mobility:    Patient completed Scooting/Bridging with minimum assistance  Patient completed Supine to Sit with moderate assistance  Patient completed Sit to Supine with moderate assistance     Functional Mobility/Transfers:  Patient completed Sit <> Stand Transfer with maximal assistance  with  hand-held assist   Functional Mobility: Pt completed 2x  scoots  with Max A to scoot along EOB.     Activities of Daily Living:  Grooming: moderate assistance for oral care completion, Pt  required CGA- Max while seated EOB. Pt noted with poor FM coordination and strength requiring assist with placing toothpaste on tooth brush and pressing button for electric toothbrush.  Pt able to complete facial hygiene with SBA, but required frequent redirection.   Lower Body Dressing: total assistance to ligia non slip socks      AMPA 6 Click ADL: 12    Treatment & Education:  Role of OT and OT POC  Weight shift ant/posterior for improved trunk control 2/2 to Pt with significant right posterior lean while EOB.  Educated on continued therapy participation and impact on increasing functional independence.  Educated on importance of progressive mobilization to increase strength and endurance.      Patient left HOB elevated with all lines intact, call button in reach, bed alarm on, and RN notified    GOALS:   Multidisciplinary Problems       Occupational Therapy Goals          Problem: Occupational Therapy    Goal Priority Disciplines Outcome Interventions   Occupational Therapy Goal     OT, PT/OT Ongoing, Progressing    Description: Goals to be met by: 9/18/23     Patient will increase functional independence with ADLs by performing:    Feeding with Set-up Assistance.  UE Dressing with Minimal Assistance.  LE Dressing with Moderate Assistance.  Grooming while EOB with Stand-by Assistance.  Toileting from bedside commode with Moderate Assistance for hygiene and clothing management.   Toilet transfer to bedside commode with Moderate  Assistance.  Upper extremity exercise program x20 reps per handout, with assistance as needed.                         Time Tracking:     OT Date of Treatment: 09/08/23  OT Start Time: 0927  OT Stop Time: 0952  OT Total Time (min): 25 min    Billable Minutes:Self Care/Home Management 10  Neuromuscular Re-education 15    OT/GENE: OT          9/8/2023

## 2023-09-08 NOTE — PLAN OF CARE
Problem: Adult Inpatient Plan of Care  Goal: Optimal Comfort and Wellbeing  Outcome: Ongoing, Progressing  Goal: Readiness for Transition of Care  Outcome: Ongoing, Progressing     Problem: Dysrhythmia  Goal: Normalized Cardiac Rhythm  Outcome: Ongoing, Progressing     Problem: COPD (Chronic Obstructive Pulmonary Disease) Comorbidity  Goal: Maintenance of COPD Symptom Control  Outcome: Ongoing, Progressing     Problem: Heart Failure Comorbidity  Goal: Maintenance of Heart Failure Symptom Control  Outcome: Ongoing, Progressing     Problem: Mobility Impairment  Goal: Optimal Mobility  Outcome: Ongoing, Progressing     Problem: Behavior Regulation Impairment (Dementia Signs/Symptoms)  Goal: Improved Behavioral Control (Dementia Signs/Symptoms)  Outcome: Ongoing, Progressing     Problem: Cognitive Impairment (Dementia Signs/Symptoms)  Goal: Optimized Cognitive Function (Dementia Signs/Symptoms)  Outcome: Ongoing, Progressing     Problem: Mood Impairment (Dementia Signs/Symptoms)  Goal: Improved Mood Symptoms (Dementia Signs/Symptoms)  Outcome: Ongoing, Progressing     Problem: Nutrition Imbalance (Dementia Signs/Symptoms)  Goal: Optimized Nutrition Intake (Dementia Signs/Symptoms)  Outcome: Ongoing, Progressing     Problem: Sleep Disturbance (Dementia Signs/Symptoms)  Goal: Improved Sleep (Dementia Signs/Symptoms)  Outcome: Ongoing, Progressing     Problem: Social or Functional Impairment (Dementia Signs/Symptoms)  Goal: Enhanced Social or Functional Skills and Ability (Dementia Signs/Symptoms)  Outcome: Ongoing, Progressing     Pt progressing towards goals. No distress notice. No falls or injuries during shift. Bed in lowest position, call light within reach, belonging at bedside. Safety precaution maintain.Plan of Care reviewed. Pt family verbalized understanding.

## 2023-09-08 NOTE — SUBJECTIVE & OBJECTIVE
Interval History: Pt seen and examined this morning on willie DICKINSON. Remains eager for discharge. Care plan reviewed. Otherwise, doing well and with no further complaints at this time.      Objective:     Vital Signs (Most Recent):  Temp: 98.6 °F (37 °C) (09/08/23 1135)  Pulse: 83 (09/08/23 1135)  Resp: 18 (09/08/23 1135)  BP: (!) 169/73 (09/08/23 1135)  SpO2: 98 % (09/08/23 1135) Vital Signs (24h Range):  Temp:  [97.9 °F (36.6 °C)-98.6 °F (37 °C)] 98.6 °F (37 °C)  Pulse:  [42-83] 83  Resp:  [18-20] 18  SpO2:  [96 %-100 %] 98 %  BP: (136-169)/(64-73) 169/73     Weight: 64.2 kg (141 lb 8.6 oz)  Body mass index is 24.29 kg/m².    Intake/Output Summary (Last 24 hours) at 9/8/2023 1340  Last data filed at 9/8/2023 0511  Gross per 24 hour   Intake 480 ml   Output --   Net 480 ml         Physical Exam  Gen: in NAD, appears stated age  Neuro: intermittently confused, CN2-12 grossly intact BL; motor, sensory, and strength grossly intact BL  HEENT: NTNC, EOMI, PERRLA, MMM; no thyromegaly or lymphadenopathy; no JVD appreciated  CVS: RRR, no m/r/g; S1/S2 auscultated with no S3 or S4; capillary refill < 2 sec  Resp: lungs CTAB, no w/r/r; no belabored breathing or accessory muscle use appreciated   Abd: BS+ in all 4 quadrants; NTND, soft to palpation; no organomegaly appreciated   Extrem: pulses full, equal, and regular over all 4 extremities; no UE or LE edema BL      Significant Labs: All pertinent labs within the past 24 hours have been reviewed.    Significant Imaging: I have reviewed all pertinent imaging results/findings within the past 24 hours.

## 2023-09-08 NOTE — NURSING
Pt agitated was up on side of bed, Place pt back in bed 2 with Tech assistance. Informed MD.Order was for Zyprexa.Order carried out.

## 2023-09-08 NOTE — PHYSICIAN QUERY
PT Name: Selma Lux  MR #: [id number]     DOCUMENTATION CLARIFICATION     CDS/:  Shawn Marques RN, CDS                   Contact Information:  [email or web address]    This form is a permanent document in the medical record.    Query Date: September 8, 2023    By submitting this query, we are merely seeking further clarification of documentation.  Please utilize your independent clinical judgment when addressing the question(s) below.    The Medical Record contains the following:   Indicator Supporting Clinical Findings     Location in Medical Record    Kidney (Renal) Insufficiency     X Kidney (Renal) Failure/Injury KERMIT (acute kidney injury)     Resolved, back to CKD baseline     Renal ultrasound showing chronic medical disease   HM PN 9/6    Nephrotoxic Agents     X BUN/Creatinine           GFR BUN 28  Creatinine 1.8  GFR 27.3    BUN 32  Creatinine 1.4  GFR 36.9 Labs 8/31        Labs 9/7         X Urine: Casts         Eosinophils Casts 1 Labs 8/31      Urine Output     X     Dehydration Patient with acute kidney injury/acute renal failure likely due to pre-renal azotemia due to dehydration KERMIT  H&P 9/1    Nausea/Vomiting      Dialysis/CRRT     X Treatment sodium chloride 0.9% bolus 1,000 mL   ED MD 8/31   X Other Chronic renal failure syndrome, stage 3 (moderate)             GFR 44.4        GFR 34   Primary Care OV 01/10/2023 - Dr. Lee. Previous Encounter    Labs 5/14/2023 - Previous Encounter    Labs 7/31/2023 - Previous Encounter          [facility] approved diagnostic criteria for acute kidney injury is based on KDIGO criteria:    An increase in serum creatinine > 0.3mg/dl within 48 hours  OR  Increase in serum creatinine to > 1.5x baseline, which is known or presumed to have occurred within the prior 7 days  OR  Urine volume <0.5 ml/kg/hr for 6 hours       The clinical guidelines noted above are only a system guideline. It does not replace the providers clinical judgment.      Provider, please clarify the diagnosis of CKD:     [  x  ] Chronic Kidney Disease (CKD) stage 3b - Moderately to severely decreased eGFR 30-44     [    ] Chronic Kidney Disease (CKD) stage 3 unspecified     [    ] Other (please specify): _______________________________         Please document in your progress notes daily for the duration of treatment until resolved and include in your discharge summary.    References:   KDIGO Clinical Practice Guideline for Acute Kidney Injury. (2012, March). Retrieved October 21, 2020, from https://kdigo.org/wp-content/uploads/2016/10/SQWEL-4458-TXQ-Guideline-English.pdf    MASSIEL Machado MD, LARRY Nelson MD, & ÁNGELA Alexandre MD. (1960). Renal medullary necrosis [Abstract]. The American Journal of Medicine, 29(1), 132-156. Doi:https://www.sciencedirect.com/science/article/abs/pii/9839520593811745    ÁNGELA Kruse MD, & RANJITH Griggs MD, MS. (2020, June 18). Definition and staging of chronic kidney disease in adults ([id number] LARRY Bah MD, ScD & [id number] DEDRA Dominguez MD, MSc, Eds.). Retrieved October 21, 2020, from https://www.[employer]/contents/definition-and-staging-of-chronic-kidney-disease-in-adults?search=ckd%20staging&source=search_result&selectedTitle=1~150&usage_type=default&display_rank=1     JAGDEEP Morgan MD, FACP. (2015, Kalani 15). Acute kidney injury revisited. Retrieved October 21, 2020, from https://acphospitalist.org/archives/2015/06/coding-acute-kidney-injury.htm    DIEGO Celeste MD. (2019, July). Renal Cortical Necrosis. Retrieved October 21, 2020, from https://www.my[employer]arrister/professional/genitourinary-disorders/renovascular-disorders/renal-cortical-necrosis    Form No. 92966

## 2023-09-08 NOTE — PT/OT/SLP PROGRESS
Physical Therapy Treatment    Patient Name:  Selma Lux   MRN:  [id number]    Recommendations:     Discharge Recommendations: nursing facility, skilled  Discharge Equipment Recommendations: to be determined by next level of care  Barriers to discharge:  Pt below functional baseline    Assessment:     Selma Lux is a 87 y.o. female admitted with a medical diagnosis of Generalized weakness.  She presents with the following impairments/functional limitations: weakness, impaired endurance, impaired self care skills, impaired functional mobility, impaired cognition, impaired balance, decreased upper extremity function, decreased lower extremity function, impaired coordination, decreased coordination .    Pt tolerates session fairly with emphasis on bed mobility and transfers Pt will continue to benefit from skilled therapy services.    Rehab Prognosis: Fair; patient would benefit from acute skilled PT services to address these deficits and reach maximum level of function.    Recent Surgery: * No surgery found *      Plan:     During this hospitalization, patient to be seen 3 x/week to address the identified rehab impairments via gait training, therapeutic activities, therapeutic exercises, neuromuscular re-education and progress toward the following goals:    Plan of Care Expires:  10/04/23    Subjective     Chief Complaint: No complaints  Patient/Family Comments/goals: Pt expressing concerns about discharge disposition at beginning of session.  Pain/Comfort:  Pain Rating 1: 0/10      Objective:     Communicated with RN (Glendy) prior to session.  Patient found right sidelying with PureWick, telemetry, bed alarm, caregiver present upon PTA entry to room.     General Precautions: Standard, fall  Orthopedic Precautions: N/A  Braces: N/A  Respiratory Status: Room air     Functional Mobility:  Bed Mobility:     Rolling Left:  moderate assistance with use of handrail  Scooting: anteriorly to EOB moderate  assistance for BLE positioning   Supine to Sit: moderate assistance for trunk/BLEs  Transfers:     Sit to Stand:  x1 trial maximal assistance x2 and x1 trial moderate assistance x2  with Bilateral hand-held assist and knee block. Verbal and tactile cues and manual assistance required to facilitate hip extension and correct FFP.     Sitting Balance at Edge of Bed:  Static Sitting Balance: Poor  Assistance Level Required: CGA to Maximal assistance  Time: 10 minutes  Postural deviations noted: posterior sway in all directions, posterior lean, losses of balance posteriorly     Standing Balance:  Static Standing Balance:Poor  Assistance Level Required: moderate/maximal assistance  Patient used: DILCIA HERNANDEZ knee block  Time: 10 seconds, 20 seconds      AM-PAC 6 CLICK MOBILITY  Turning over in bed (including adjusting bedclothes, sheets and blankets)?: 2  Sitting down on and standing up from a chair with arms (e.g., wheelchair, bedside commode, etc.): 2  Moving from lying on back to sitting on the side of the bed?: 2  Moving to and from a bed to a chair (including a wheelchair)?: 1  Need to walk in hospital room?: 1  Climbing 3-5 steps with a railing?: 1  Basic Mobility Total Score: 9       Treatment & Education:  Patient provided with daily orientation and goals of this PT session. They were educated to call for assistance and to transfer with hospital staff only.  Also, pt was educated on the effects of prolonged immobility and the importance of performing OOB activity and exercises to promote healing and reduce recovery time    Therex handout provided for supine exercises.    Patient left HOB elevated with all lines intact, call button in reach, bed alarm on, and Caregiver present.    GOALS:   Multidisciplinary Problems       Physical Therapy Goals          Problem: Physical Therapy    Goal Priority Disciplines Outcome Goal Variances Interventions   Physical Therapy Goal     PT, PT/OT Ongoing, Progressing     Description:  Goals to be met by:      Patient will increase functional independence with mobility by performin. Supine to sit with contact guard Assistance  2. Sit to supine with contact guard Assistance  3. Sit to stand transfer with Minimal Assistance  4. Bed to chair transfer with Moderate Assistance using LRAD.   5. Sitting at edge of bed x10 minutes with Contact Guard Assistance                         Time Tracking:     PT Received On: 23  PT Start Time: 1145     PT Stop Time: 1205  PT Total Time (min): 20 min     Billable Minutes: Therapeutic Activity 20    Treatment Type: Treatment  PT/PTA: PTA     Number of PTA visits since last PT visit: 2023

## 2023-09-08 NOTE — NURSING
Pt voided incontinently in bed.also has a large soft dark brown stool.pt cleaned.entire bed linen changed.barrier cream applied.new purewick applied.

## 2023-09-09 NOTE — SUBJECTIVE & OBJECTIVE
Interval History: Pt seen and examined this morning on willie DICKINSON. Remains eager for discharge. Care plan reviewed. Otherwise, doing well and with no further complaints at this time.      Objective:     Vital Signs (Most Recent):  Temp: 97.5 °F (36.4 °C) (09/09/23 1119)  Pulse: 79 (09/09/23 1119)  Resp: 20 (09/09/23 1119)  BP: (!) 100/53 (09/09/23 1119)  SpO2: 98 % (09/09/23 1119) Vital Signs (24h Range):  Temp:  [97.4 °F (36.3 °C)-98.7 °F (37.1 °C)] 97.5 °F (36.4 °C)  Pulse:  [79-95] 79  Resp:  [18-20] 20  SpO2:  [95 %-98 %] 98 %  BP: (100-184)/(53-83) 100/53     Weight: 64.2 kg (141 lb 8.6 oz)  Body mass index is 24.29 kg/m².  No intake or output data in the 24 hours ending 09/09/23 1126        Physical Exam  Gen: in NAD, appears stated age  Neuro: intermittently confused, CN2-12 grossly intact BL; motor, sensory, and strength grossly intact BL  HEENT: NTNC, EOMI, PERRLA, MMM; no thyromegaly or lymphadenopathy; no JVD appreciated  CVS: RRR, no m/r/g; S1/S2 auscultated with no S3 or S4; capillary refill < 2 sec  Resp: lungs CTAB, no w/r/r; no belabored breathing or accessory muscle use appreciated   Abd: BS+ in all 4 quadrants; NTND, soft to palpation; no organomegaly appreciated   Extrem: pulses full, equal, and regular over all 4 extremities; no UE or LE edema BL      Significant Labs: All pertinent labs within the past 24 hours have been reviewed.    Significant Imaging: I have reviewed all pertinent imaging results/findings within the past 24 hours.

## 2023-09-09 NOTE — ASSESSMENT & PLAN NOTE
- Awaiting SNF acceptance, denied by multiple facilities  - PT/OT following  - SW/CM enlisted for assistance with discharge planning

## 2023-09-09 NOTE — PROGRESS NOTES
[facility] [address] [place] [address] Medicine  Progress Note    Patient Name: Selma Lux  MRN: [id number]  Patient Class: IP- Inpatient   Admission Date: 8/31/2023  Length of Stay: 8 days  Attending Physician: Skyler Slater MD  Primary Care Provider: Génesis Rosario MD        Subjective:     Principal Problem:Discharge planning issues        HPI:  Patient is a 86 yo F with PMH of vascular dementia, subdural hematoma, adrenal insufficiency, hypertension, COPD, RA with ILD on hydroxychloroquine and mycophenolate, pAF who is presenting with an acute on chronic mental status change. Patient is accompanied by her daughter who provides much of the history although the patient is able to answer questions about ROS.     In the ED,  Patient hypotensive on admit but rapidly corrected without fluid however patient did eventually receive a L of LR and pip/tazo plus vanc for empiric sepsis. Blood cultures drawn and pending. Lab w/u performed in ED revealed stable anemia without leukocytosis. CXR clear. UA not c/w with UTI. MRI and CT head unrevealing for any acute change. Patient did have what appears to be an KERMIT on her CMP although her creatinine has been fluctuating at a higher level than prior to may 2023.       Overview/Hospital Course:  Extended EEGs showed nonspecific generalized slowing but no acute epileptiform activity. Steroids were restarted and escalated to stress dosing with marked improvement in somnolence. Creatinine bumped noted suspected to be 2/2 dehydration which improved w/ IVFs. Renal US showing medical renal dx. RN reported possible stone visualization on urinary excretion on 9/4/23; CT stone protocol negative for hydronephrosis or any obstructive nephrolithiasis. Pt overall sleep wake cycle improved w/ stress dosing of steroids and seroquel hs. Pt continued to do well into 9/7. She was denied by multiple SNFs, so family opted to go home with .   Update: family appealing discharge,  stating they need SNF and cannot accommodate at home with caregivers on the weekend. EBONIE/NATALIA working on SNF placement.      Interval History: Pt seen and examined this morning on rounds. TEENA. Remains eager for discharge. Care plan reviewed. Otherwise, doing well and with no further complaints at this time.      Objective:     Vital Signs (Most Recent):  Temp: 97.5 °F (36.4 °C) (09/09/23 1119)  Pulse: 79 (09/09/23 1119)  Resp: 20 (09/09/23 1119)  BP: (!) 100/53 (09/09/23 1119)  SpO2: 98 % (09/09/23 1119) Vital Signs (24h Range):  Temp:  [97.4 °F (36.3 °C)-98.7 °F (37.1 °C)] 97.5 °F (36.4 °C)  Pulse:  [79-95] 79  Resp:  [18-20] 20  SpO2:  [95 %-98 %] 98 %  BP: (100-184)/(53-83) 100/53     Weight: 64.2 kg (141 lb 8.6 oz)  Body mass index is 24.29 kg/m².  No intake or output data in the 24 hours ending 09/09/23 1126        Physical Exam  Gen: in NAD, appears stated age  Neuro: intermittently confused, CN2-12 grossly intact BL; motor, sensory, and strength grossly intact BL  HEENT: NTNC, EOMI, PERRLA, MMM; no thyromegaly or lymphadenopathy; no JVD appreciated  CVS: RRR, no m/r/g; S1/S2 auscultated with no S3 or S4; capillary refill < 2 sec  Resp: lungs CTAB, no w/r/r; no belabored breathing or accessory muscle use appreciated   Abd: BS+ in all 4 quadrants; NTND, soft to palpation; no organomegaly appreciated   Extrem: pulses full, equal, and regular over all 4 extremities; no UE or LE edema BL      Significant Labs: All pertinent labs within the past 24 hours have been reviewed.    Significant Imaging: I have reviewed all pertinent imaging results/findings within the past 24 hours.      Assessment/Plan:      * Discharge planning issues  - Awaiting SNF acceptance, denied by multiple facilities  - PT/OT following  - ALEXA enlisted for assistance with discharge planning    Generalized weakness   MRI and CT which were both negative. Associated w/ worsening confusion at home, sleep disruption, and inability to feed herself;  acute onset in 1 week  - neurology consulted - conservative measures; do not recommend LP- family also declines LP.  - stablevit B 12; so far urine and blood cx's unremarkable  - continue steroid taper  - PT/OT following  - SW/CM enlisted for assistance with discharge planning    Heart failure with preserved ejection fraction  Appears stable clinically.     Most recent echo:   The left ventricle is normal in size with concentric hypertrophy and normal systolic function. The estimated ejection fraction is 65%.   Normal right ventricular size with normal right ventricular systolic function.   Grade II left ventricular diastolic dysfunction.   Severe left atrial enlargement.   Mild-to-moderate mitral regurgitation.   The estimated PA systolic pressure is 43 mmHg.   Normal central venous pressure (3 mmHg).               Plan:  - continue to monitor  - euvolemic  - Avoid salt intake > 2 g/day  - magnesium > 2, potassium > 4  - cardiac diet      Vascular Dementia  Doing better with 12.5 mg of seroquel at night  Can consider depakote during the daytime should pt have problematic delirium episodes    Mixed hyperlipidemia  Continue atorvastatin      Thrombocytopenia  Appears to be stable. Per PCP Dr. Lee, they were monitoring with periodic CBCs      AF (paroxysmal atrial fibrillation)  Patient with Long standing persistent (>12 months) atrial fibrillation which is uncontrolled currently with previous BB but taken off due to concern for AEs. Patient is currently in sinus rhythm.[id number] Score: 6. Anticoagulation indicated. Anticoagulation done with apixaban 2.5 BID.    Rheumatoid arthritis of multiple sites  Takes mycophenolate and hydroxychloroquine at home for ILD and connective tissue manifestatons of RA  - Continue HCQ, myco; prophy. bactrim  - Solu-Cortef  hours stress dosing - deescalate down to oral prednisone rodger am      VTE Risk Mitigation (From admission, onward)         Ordered     apixaban tablet 2.5  mg  2 times daily         08/31/23 2153     IP VTE HIGH RISK PATIENT  Once         08/31/23 2153     Place sequential compression device  Until discontinued         08/31/23 2153                Discharge Planning   LETICIA: 9/11/2023     Code Status: Full Code   Is the patient medically ready for discharge?: Yes    Reason for patient still in hospital (select all that apply): Patient trending condition  Discharge Plan A: (P) Skilled Nursing Facility          Skyler Slater MD  Attending Physician  Medical Director - [facility]  Department of Hospital Medicine  9/9/2023

## 2023-09-09 NOTE — PLAN OF CARE
Problem: Adult Inpatient Plan of Care  Goal: Optimal Comfort and Wellbeing  Outcome: Ongoing, Progressing     Problem: Cognitive Impairment (Dementia Signs/Symptoms)  Goal: Optimized Cognitive Function (Dementia Signs/Symptoms)  Outcome: Ongoing, Progressing     Problem: Nutrition Imbalance (Dementia Signs/Symptoms)  Goal: Optimized Nutrition Intake (Dementia Signs/Symptoms)  Outcome: Ongoing, Progressing     Problem: COPD (Chronic Obstructive Pulmonary Disease) Comorbidity  Goal: Maintenance of COPD Symptom Control  Outcome: Ongoing, Progressing     Problem: Dysrhythmia  Goal: Normalized Cardiac Rhythm  Outcome: Ongoing, Progressing

## 2023-09-10 NOTE — NURSING
Pt AAOX1. No distress noted. Bed in lowest position. Side rails up x2. Call bell and personal belongs within reach. Safety precautions maintained. Bed alarm activated. Pt free of falls or injuries. No further issues or concerns at this time. Plan of care continues.

## 2023-09-10 NOTE — PLAN OF CARE
Problem: Adult Inpatient Plan of Care  Goal: Plan of Care Review  9/10/2023 1707 by Chioma Russell RN  Outcome: Ongoing, Progressing  9/10/2023 1707 by Chioma Russell RN  Reactivated  Goal: Absence of Hospital-Acquired Illness or Injury  9/10/2023 1707 by Chioma Russell RN  Outcome: Ongoing, Progressing  9/10/2023 1707 by Chioma Russell RN  Reactivated  Goal: Optimal Comfort and Wellbeing  Outcome: Ongoing, Progressing  Goal: Readiness for Transition of Care  Outcome: Ongoing, Progressing     Problem: Fall Injury Risk  Goal: Absence of Fall and Fall-Related Injury  9/10/2023 1707 by Chioma Russell RN  Outcome: Ongoing, Progressing  9/10/2023 1707 by Chioma Russell RN  Reactivated     Problem: COPD (Chronic Obstructive Pulmonary Disease) Comorbidity  Goal: Maintenance of COPD Symptom Control  Outcome: Ongoing, Progressing  Plan of care reviewed with pt and family. Pt progressing toward goals. No distress noted. No falls or injuries during shift. Pt bed in lowest position. Side rails x2. Bed alarm activated. Call bell and personal belongs within reach. Safety precautions maintained.

## 2023-09-10 NOTE — PROGRESS NOTES
[facility] [address] [place] [address] Medicine  Progress Note    Patient Name: Selma Lux  MRN: [id number]  Patient Class: IP- Inpatient   Admission Date: 8/31/2023  Length of Stay: 9 days  Attending Physician: Skyler Slater MD  Primary Care Provider: Génesis Rosario MD        Subjective:     Principal Problem:Discharge planning issues        HPI:  Patient is a 88 yo F with PMH of vascular dementia, subdural hematoma, adrenal insufficiency, hypertension, COPD, RA with ILD on hydroxychloroquine and mycophenolate, pAF who is presenting with an acute on chronic mental status change. Patient is accompanied by her daughter who provides much of the history although the patient is able to answer questions about ROS.     In the ED,  Patient hypotensive on admit but rapidly corrected without fluid however patient did eventually receive a L of LR and pip/tazo plus vanc for empiric sepsis. Blood cultures drawn and pending. Lab w/u performed in ED revealed stable anemia without leukocytosis. CXR clear. UA not c/w with UTI. MRI and CT head unrevealing for any acute change. Patient did have what appears to be an KERMIT on her CMP although her creatinine has been fluctuating at a higher level than prior to may 2023.       Overview/Hospital Course:  Extended EEGs showed nonspecific generalized slowing but no acute epileptiform activity. Steroids were restarted and escalated to stress dosing with marked improvement in somnolence. Creatinine bumped noted suspected to be 2/2 dehydration which improved w/ IVFs. Renal US showing medical renal dx. RN reported possible stone visualization on urinary excretion on 9/4/23; CT stone protocol negative for hydronephrosis or any obstructive nephrolithiasis. Pt overall sleep wake cycle improved w/ stress dosing of steroids and seroquel hs. Pt continued to do well into 9/7. She was denied by multiple SNFs, so family opted to go home with .   Update: family appealing discharge,  stating they need SNF and cannot accommodate at home with caregivers on the weekend. SW/NATALIA working on SNF placement.      Interval History: Pt seen and examined this morning on rounds. TEENA. Pleasantly demented. Care plan reviewed. Otherwise, doing well and with no further complaints at this time.      Objective:     Vital Signs (Most Recent):  Temp: 97.5 °F (36.4 °C) (09/10/23 1121)  Pulse: 85 (09/10/23 1121)  Resp: 18 (09/10/23 1121)  BP: (!) 114/57 (09/10/23 1121)  SpO2: (!) 93 % (09/10/23 1121) Vital Signs (24h Range):  Temp:  [97.5 °F (36.4 °C)-98.6 °F (37 °C)] 97.5 °F (36.4 °C)  Pulse:  [75-87] 85  Resp:  [16-19] 18  SpO2:  [93 %-99 %] 93 %  BP: (107-128)/(51-59) 114/57     Weight: 64.2 kg (141 lb 8.6 oz)  Body mass index is 24.29 kg/m².    Intake/Output Summary (Last 24 hours) at 9/10/2023 1137  Last data filed at 9/9/2023 1840  Gross per 24 hour   Intake 236 ml   Output 300 ml   Net -64 ml           Physical Exam  Gen: in NAD, appears stated age  Neuro: intermittently confused, CN2-12 grossly intact BL; motor, sensory, and strength grossly intact BL  HEENT: NTNC, EOMI, PERRLA, MMM; no thyromegaly or lymphadenopathy; no JVD appreciated  CVS: RRR, no m/r/g; S1/S2 auscultated with no S3 or S4; capillary refill < 2 sec  Resp: lungs CTAB, no w/r/r; no belabored breathing or accessory muscle use appreciated   Abd: BS+ in all 4 quadrants; NTND, soft to palpation; no organomegaly appreciated   Extrem: pulses full, equal, and regular over all 4 extremities; no UE or LE edema BL      Significant Labs: All pertinent labs within the past 24 hours have been reviewed.    Significant Imaging: I have reviewed all pertinent imaging results/findings within the past 24 hours.      Assessment/Plan:      * Discharge planning issues  - Awaiting SNF acceptance, denied by multiple facilities  - PT/OT following  - SW/CM enlisted for assistance with discharge planning    Generalized weakness   MRI and CT which were both negative.  Associated w/ worsening confusion at home, sleep disruption, and inability to feed herself; acute onset in 1 week  - neurology consulted - conservative measures; do not recommend LP- family also declines LP.  - stablevit B 12; so far urine and blood cx's unremarkable  - continue steroid taper  - PT/OT following  - SW/CM enlisted for assistance with discharge planning    Heart failure with preserved ejection fraction  Appears stable clinically.     Most recent echo:   The left ventricle is normal in size with concentric hypertrophy and normal systolic function. The estimated ejection fraction is 65%.   Normal right ventricular size with normal right ventricular systolic function.   Grade II left ventricular diastolic dysfunction.   Severe left atrial enlargement.   Mild-to-moderate mitral regurgitation.   The estimated PA systolic pressure is 43 mmHg.   Normal central venous pressure (3 mmHg).               Plan:  - continue to monitor  - euvolemic  - Avoid salt intake > 2 g/day  - magnesium > 2, potassium > 4  - cardiac diet      Vascular Dementia  Doing better with 12.5 mg of seroquel at night  Can consider depakote during the daytime should pt have problematic delirium episodes    Mixed hyperlipidemia  Continue atorvastatin      Thrombocytopenia  Appears to be stable. Per PCP Dr. Lee, they were monitoring with periodic CBCs      AF (paroxysmal atrial fibrillation)  Patient with Long standing persistent (>12 months) atrial fibrillation which is uncontrolled currently with previous BB but taken off due to concern for AEs. Patient is currently in sinus rhythm.[id number] Score: 6. Anticoagulation indicated. Anticoagulation done with apixaban 2.5 BID.    Rheumatoid arthritis of multiple sites  Takes mycophenolate and hydroxychloroquine at home for ILD and connective tissue manifestatons of RA  - Continue HCQ, myco; prophy. bactrim    VTE Risk Mitigation (From admission, onward)         Ordered     apixaban  tablet 2.5 mg  2 times daily         08/31/23 2153     IP VTE HIGH RISK PATIENT  Once         08/31/23 2153     Place sequential compression device  Until discontinued         08/31/23 2153                Discharge Planning   LETICIA: 9/11/2023     Code Status: Full Code   Is the patient medically ready for discharge?: Yes    Reason for patient still in hospital (select all that apply): Patient trending condition  Discharge Plan A: (P) Skilled Nursing Facility          Skyler Slater MD  Attending Physician  Medical Director - [facility] [place] [place] [facility]  Department of Hospital Medicine  9/10/2023

## 2023-09-10 NOTE — PLAN OF CARE
Problem: Adult Inpatient Plan of Care  Goal: Optimal Comfort and Wellbeing  9/10/2023 0641 by Alyssa Yang LPN  Outcome: Ongoing, Progressing  9/10/2023 0640 by Alyssa Yang LPN  Outcome: Ongoing, Progressing  Goal: Readiness for Transition of Care  9/10/2023 0641 by Alyssa Yang LPN  Outcome: Ongoing, Progressing  9/10/2023 0640 by Alyssa Yang LPN  Outcome: Ongoing, Progressing     Problem: Dysrhythmia  Goal: Normalized Cardiac Rhythm  9/10/2023 0641 by Alyssa Yang LPN  Outcome: Ongoing, Progressing  9/10/2023 0640 by Alyssa Yang LPN  Outcome: Ongoing, Progressing     Problem: COPD (Chronic Obstructive Pulmonary Disease) Comorbidity  Goal: Maintenance of COPD Symptom Control  9/10/2023 0641 by Alyssa Yang LPN  Outcome: Ongoing, Progressing  9/10/2023 0640 by Alyssa Yang LPN  Outcome: Ongoing, Progressing     Problem: Heart Failure Comorbidity  Goal: Maintenance of Heart Failure Symptom Control  9/10/2023 0641 by Alyssa Yang LPN  Outcome: Ongoing, Progressing  9/10/2023 0640 by Alyssa Yang LPN  Outcome: Ongoing, Progressing     Problem: Mobility Impairment  Goal: Optimal Mobility  9/10/2023 0641 by Alyssa Yang LPN  Outcome: Ongoing, Progressing  9/10/2023 0640 by Alyssa Yang LPN  Outcome: Ongoing, Progressing     Problem: Behavior Regulation Impairment (Dementia Signs/Symptoms)  Goal: Improved Behavioral Control (Dementia Signs/Symptoms)  9/10/2023 0641 by Alyssa Yang LPN  Outcome: Ongoing, Progressing  9/10/2023 0640 by Alyssa Yang LPN  Outcome: Ongoing, Progressing     Problem: Cognitive Impairment (Dementia Signs/Symptoms)  Goal: Optimized Cognitive Function (Dementia Signs/Symptoms)  9/10/2023 0641 by Alyssa Yang LPN  Outcome: Ongoing, Progressing  9/10/2023 0640 by Alyssa Yang LPN  Outcome: Ongoing, Progressing     Problem: Mood Impairment (Dementia Signs/Symptoms)  Goal: Improved Mood Symptoms (Dementia Signs/Symptoms)  9/10/2023 0641 by Trey  MARVEL Shah  Outcome: Ongoing, Progressing  9/10/2023 0640 by Alyssa Yang LPN  Outcome: Ongoing, Progressing     Problem: Nutrition Imbalance (Dementia Signs/Symptoms)  Goal: Optimized Nutrition Intake (Dementia Signs/Symptoms)  9/10/2023 0641 by Alyssa Yang LPN  Outcome: Ongoing, Progressing  9/10/2023 0640 by Alyssa Yang LPN  Outcome: Ongoing, Progressing     Problem: Sleep Disturbance (Dementia Signs/Symptoms)  Goal: Improved Sleep (Dementia Signs/Symptoms)  9/10/2023 0641 by Alyssa Yang LPN  Outcome: Ongoing, Progressing  9/10/2023 0640 by Alyssa Yang LPN  Outcome: Ongoing, Progressing     Problem: Social or Functional Impairment (Dementia Signs/Symptoms)  Goal: Enhanced Social or Functional Skills and Ability (Dementia Signs/Symptoms)  9/10/2023 0641 by Alyssa Yang LPN  Outcome: Ongoing, Progressing  9/10/2023 0640 by Alyssa Yang LPN  Outcome: Ongoing, Progressing

## 2023-09-10 NOTE — SUBJECTIVE & OBJECTIVE
Interval History: Pt seen and examined this morning on willie DICKINSON. Pleasantly demented. Care plan reviewed. Otherwise, doing well and with no further complaints at this time.      Objective:     Vital Signs (Most Recent):  Temp: 97.5 °F (36.4 °C) (09/10/23 1121)  Pulse: 85 (09/10/23 1121)  Resp: 18 (09/10/23 1121)  BP: (!) 114/57 (09/10/23 1121)  SpO2: (!) 93 % (09/10/23 1121) Vital Signs (24h Range):  Temp:  [97.5 °F (36.4 °C)-98.6 °F (37 °C)] 97.5 °F (36.4 °C)  Pulse:  [75-87] 85  Resp:  [16-19] 18  SpO2:  [93 %-99 %] 93 %  BP: (107-128)/(51-59) 114/57     Weight: 64.2 kg (141 lb 8.6 oz)  Body mass index is 24.29 kg/m².    Intake/Output Summary (Last 24 hours) at 9/10/2023 1137  Last data filed at 9/9/2023 1840  Gross per 24 hour   Intake 236 ml   Output 300 ml   Net -64 ml           Physical Exam  Gen: in NAD, appears stated age  Neuro: intermittently confused, CN2-12 grossly intact BL; motor, sensory, and strength grossly intact BL  HEENT: NTNC, EOMI, PERRLA, MMM; no thyromegaly or lymphadenopathy; no JVD appreciated  CVS: RRR, no m/r/g; S1/S2 auscultated with no S3 or S4; capillary refill < 2 sec  Resp: lungs CTAB, no w/r/r; no belabored breathing or accessory muscle use appreciated   Abd: BS+ in all 4 quadrants; NTND, soft to palpation; no organomegaly appreciated   Extrem: pulses full, equal, and regular over all 4 extremities; no UE or LE edema BL      Significant Labs: All pertinent labs within the past 24 hours have been reviewed.    Significant Imaging: I have reviewed all pertinent imaging results/findings within the past 24 hours.

## 2023-09-10 NOTE — ASSESSMENT & PLAN NOTE
Takes mycophenolate and hydroxychloroquine at home for ILD and connective tissue manifestatons of RA  - Continue HCQ, myco; prophy. bactrim

## 2023-09-11 NOTE — PLAN OF CARE
NATALIA called Frances @ 0932 & 2:30pm. NATALIA had to leave a message. NATALIA called Reyes in admission. He states that he will call me back with the answer. Irena Yang RN

## 2023-09-11 NOTE — PLAN OF CARE
Problem: Adult Inpatient Plan of Care  Goal: Plan of Care Review  9/11/2023 0209 by Alyssa Yang LPN  Outcome: Ongoing, Progressing  9/11/2023 0209 by Alyssa Yang LPN  Outcome: Ongoing, Progressing  Goal: Absence of Hospital-Acquired Illness or Injury  9/11/2023 0209 by Alyssa Yang LPN  Outcome: Ongoing, Progressing  9/11/2023 0209 by Alyssa Yang LPN  Outcome: Ongoing, Progressing  Goal: Optimal Comfort and Wellbeing  9/11/2023 0209 by Alyssa Yang LPN  Outcome: Ongoing, Progressing  9/11/2023 0209 by Alyssa Yang LPN  Outcome: Ongoing, Progressing  Goal: Readiness for Transition of Care  9/11/2023 0209 by Alyssa Yang LPN  Outcome: Ongoing, Progressing  9/11/2023 0209 by Alyssa Yang LPN  Outcome: Ongoing, Progressing     Problem: Fall Injury Risk  Goal: Absence of Fall and Fall-Related Injury  9/11/2023 0209 by Alyssa Yang LPN  Outcome: Ongoing, Progressing  9/11/2023 0209 by Alyssa Yang LPN  Outcome: Ongoing, Progressing     Problem: Dysrhythmia  Goal: Normalized Cardiac Rhythm  9/11/2023 0209 by Alyssa Yang LPN  Outcome: Ongoing, Progressing  9/11/2023 0209 by Alyssa Yang LPN  Outcome: Ongoing, Progressing     Problem: COPD (Chronic Obstructive Pulmonary Disease) Comorbidity  Goal: Maintenance of COPD Symptom Control  9/11/2023 0209 by Alyssa Yang LPN  Outcome: Ongoing, Progressing  9/11/2023 0209 by Alyssa Yang LPN  Outcome: Ongoing, Progressing     Problem: Heart Failure Comorbidity  Goal: Maintenance of Heart Failure Symptom Control  9/11/2023 0209 by Alyssa Yang LPN  Outcome: Ongoing, Progressing  9/11/2023 0209 by Alyssa Yang LPN  Outcome: Ongoing, Progressing     Problem: Mobility Impairment  Goal: Optimal Mobility  9/11/2023 0209 by Alyssa Yang LPN  Outcome: Ongoing, Progressing  9/11/2023 0209 by Alyssa Yang LPN  Outcome: Ongoing, Progressing     Problem: Behavior Regulation Impairment (Dementia Signs/Symptoms)  Goal: Improved  Behavioral Control (Dementia Signs/Symptoms)  9/11/2023 0209 by Alyssa Yang LPN  Outcome: Ongoing, Progressing  9/11/2023 0209 by Alyssa Yang LPN  Outcome: Ongoing, Progressing     Problem: Cognitive Impairment (Dementia Signs/Symptoms)  Goal: Optimized Cognitive Function (Dementia Signs/Symptoms)  9/11/2023 0209 by Alyssa Yang LPN  Outcome: Ongoing, Progressing  9/11/2023 0209 by Alyssa Yang LPN  Outcome: Ongoing, Progressing     Problem: Mood Impairment (Dementia Signs/Symptoms)  Goal: Improved Mood Symptoms (Dementia Signs/Symptoms)  9/11/2023 0209 by Alyssa Yang LPN  Outcome: Ongoing, Progressing  9/11/2023 0209 by Alyssa Yang LPN  Outcome: Ongoing, Progressing     Problem: Nutrition Imbalance (Dementia Signs/Symptoms)  Goal: Optimized Nutrition Intake (Dementia Signs/Symptoms)  9/11/2023 0209 by Alyssa Yang LPN  Outcome: Ongoing, Progressing  9/11/2023 0209 by Alyssa Yang LPN  Outcome: Ongoing, Progressing     Problem: Sleep Disturbance (Dementia Signs/Symptoms)  Goal: Improved Sleep (Dementia Signs/Symptoms)  9/11/2023 0209 by Alyssa Yang LPN  Outcome: Ongoing, Progressing  9/11/2023 0209 by Alyssa Yang LPN  Outcome: Ongoing, Progressing     Problem: Social or Functional Impairment (Dementia Signs/Symptoms)  Goal: Enhanced Social or Functional Skills and Ability (Dementia Signs/Symptoms)  9/11/2023 0209 by Alyssa Yang LPN  Outcome: Ongoing, Progressing  9/11/2023 0209 by Alyssa Yang LPN  Outcome: Ongoing, Progressing

## 2023-09-11 NOTE — PROGRESS NOTES
[facility] [address] [place] [address] Medicine  Progress Note    Patient Name: Selma Lux  MRN: [id number]  Patient Class: IP- Inpatient   Admission Date: 8/31/2023  Length of Stay: 10 days  Attending Physician: Skyler Slater MD  Primary Care Provider: Génesis Rosario MD        Subjective:     Principal Problem:Discharge planning issues        HPI:  Patient is a 86 yo F with PMH of vascular dementia, subdural hematoma, adrenal insufficiency, hypertension, COPD, RA with ILD on hydroxychloroquine and mycophenolate, pAF who is presenting with an acute on chronic mental status change. Patient is accompanied by her daughter who provides much of the history although the patient is able to answer questions about ROS.     In the ED,  Patient hypotensive on admit but rapidly corrected without fluid however patient did eventually receive a L of LR and pip/tazo plus vanc for empiric sepsis. Blood cultures drawn and pending. Lab w/u performed in ED revealed stable anemia without leukocytosis. CXR clear. UA not c/w with UTI. MRI and CT head unrevealing for any acute change. Patient did have what appears to be an KERMIT on her CMP although her creatinine has been fluctuating at a higher level than prior to may 2023.       Overview/Hospital Course:  Extended EEGs showed nonspecific generalized slowing but no acute epileptiform activity. Steroids were restarted and escalated to stress dosing with marked improvement in somnolence. Creatinine bumped noted suspected to be 2/2 dehydration which improved w/ IVFs. Renal US showing medical renal dx. RN reported possible stone visualization on urinary excretion on 9/4/23; CT stone protocol negative for hydronephrosis or any obstructive nephrolithiasis. Pt overall sleep wake cycle improved w/ stress dosing of steroids and seroquel hs. Pt continued to do well into 9/7. She was denied by multiple SNFs, so family opted to go home with .   Update: family appealing discharge,  stating they need SNF and cannot accommodate at home with caregivers on the weekend. SW/NATALIA working on SNF placement.      Interval History: Pt seen and examined this morning on rounds. TEENA. Pleasantly demented. Care plan reviewed. Otherwise, doing well and with no further complaints at this time.      Objective:     Vital Signs (Most Recent):  Temp: 96.7 °F (35.9 °C) (09/11/23 1153)  Pulse: 80 (09/11/23 1153)  Resp: 18 (09/11/23 1153)  BP: 122/60 (09/11/23 1153)  SpO2: 98 % (09/11/23 1153) Vital Signs (24h Range):  Temp:  [96.7 °F (35.9 °C)-99.5 °F (37.5 °C)] 96.7 °F (35.9 °C)  Pulse:  [64-87] 80  Resp:  [17-20] 18  SpO2:  [94 %-99 %] 98 %  BP: ()/(46-66) 122/60     Weight: 64.2 kg (141 lb 8.6 oz)  Body mass index is 24.29 kg/m².    Intake/Output Summary (Last 24 hours) at 9/11/2023 1434  Last data filed at 9/10/2023 2149  Gross per 24 hour   Intake 250 ml   Output 250 ml   Net 0 ml           Physical Exam  Gen: in NAD, appears stated age  Neuro: intermittently confused, CN2-12 grossly intact BL; motor, sensory, and strength grossly intact BL  HEENT: NTNC, EOMI, PERRLA, MMM; no thyromegaly or lymphadenopathy; no JVD appreciated  CVS: RRR, no m/r/g; S1/S2 auscultated with no S3 or S4; capillary refill < 2 sec  Resp: lungs CTAB, no w/r/r; no belabored breathing or accessory muscle use appreciated   Abd: BS+ in all 4 quadrants; NTND, soft to palpation; no organomegaly appreciated   Extrem: pulses full, equal, and regular over all 4 extremities; no UE or LE edema BL      Significant Labs: All pertinent labs within the past 24 hours have been reviewed.    Significant Imaging: I have reviewed all pertinent imaging results/findings within the past 24 hours.      Assessment/Plan:      * Discharge planning issues  - Awaiting SNF acceptance, denied by multiple facilities  - PT/OT following  - SW/CM enlisted for assistance with discharge planning    Generalized weakness   MRI and CT which were both negative. Associated  w/ worsening confusion at home, sleep disruption, and inability to feed herself; acute onset in 1 week  - neurology consulted - conservative measures; do not recommend LP- family also declines LP.  - stablevit B 12; so far urine and blood cx's unremarkable  - continue steroid taper  - PT/OT following  - SW/CM enlisted for assistance with discharge planning    Heart failure with preserved ejection fraction  Appears stable clinically.     Most recent echo:   The left ventricle is normal in size with concentric hypertrophy and normal systolic function. The estimated ejection fraction is 65%.   Normal right ventricular size with normal right ventricular systolic function.   Grade II left ventricular diastolic dysfunction.   Severe left atrial enlargement.   Mild-to-moderate mitral regurgitation.   The estimated PA systolic pressure is 43 mmHg.   Normal central venous pressure (3 mmHg).               Plan:  - continue to monitor  - euvolemic  - Avoid salt intake > 2 g/day  - magnesium > 2, potassium > 4  - cardiac diet      Vascular Dementia  Doing better with 12.5 mg of seroquel at night  Can consider depakote during the daytime should pt have problematic delirium episodes    Mixed hyperlipidemia  Continue atorvastatin      Thrombocytopenia  Appears to be stable. Per PCP Dr. Lee, they were monitoring with periodic CBCs      AF (paroxysmal atrial fibrillation)  Patient with Long standing persistent (>12 months) atrial fibrillation which is uncontrolled currently with previous BB but taken off due to concern for AEs. Patient is currently in sinus rhythm.[id number] Score: 6. Anticoagulation indicated. Anticoagulation done with apixaban 2.5 BID.    Rheumatoid arthritis of multiple sites  Takes mycophenolate and hydroxychloroquine at home for ILD and connective tissue manifestatons of RA  - Continue HCQ, myco; prophy. bactrim      VTE Risk Mitigation (From admission, onward)         Ordered     apixaban tablet 2.5  mg  2 times daily         08/31/23 2153     IP VTE HIGH RISK PATIENT  Once         08/31/23 2153     Place sequential compression device  Until discontinued         08/31/23 2153                Discharge Planning   LETIICA: 9/11/2023     Code Status: Full Code   Is the patient medically ready for discharge?: Yes    Reason for patient still in hospital (select all that apply): Patient trending condition  Discharge Plan A: (P) Skilled Nursing Facility            Skyler Slater MD  Attending Physician  Medical Director - [facility] [place] [facility]  Department of Hospital Medicine  9/11/2023

## 2023-09-11 NOTE — PT/OT/SLP PROGRESS
Physical Therapy Co-Treatment    Patient Name:  Selma Lux   MRN:  [id number]    Recommendations:     Discharge Recommendations: nursing facility, skilled  Discharge Equipment Recommendations: to be determined by next level of care  Barriers to discharge:  current level of assist required    Co-treatment performed due to patient's multiple deficits requiring two skilled therapists to appropriately and safely assess patient's strength and endurance while facilitating functional tasks in addition to accommodating for patient's activity tolerance.     Assessment:     Selma Lux is a 87 y.o. female admitted with a medical diagnosis of Discharge planning issues.  She presents with the following impairments/functional limitations: weakness, impaired functional mobility, impaired cognition, decreased safety awareness, impaired coordination, impaired endurance, gait instability, impaired balance, decreased upper extremity function, impaired self care skills, decreased lower extremity function.    Pt agreeable to therapy and tolerated session well. Pt required max A x2 for bed mobility and sat EOB x5min with CGA-Min A 2/2 multidirectional swaying.    Rehab Prognosis: Good; patient would benefit from acute skilled PT services to address these deficits and reach maximum level of function.    Recent Surgery: * No surgery found *      Plan:     During this hospitalization, patient to be seen 3 x/week to address the identified rehab impairments via gait training, therapeutic activities, therapeutic exercises, neuromuscular re-education and progress toward the following goals:    Plan of Care Expires:  10/04/23    Subjective     Patient/Family Comments/goals: None stated  Pain/Comfort:  Pain Rating 1: 0/10      Objective:     Communicated with nurse prior to session.  Patient found supine with PureWick, telemetry upon PT entry to room. Caregiver present.    General Precautions: Standard, fall  Orthopedic  Precautions: N/A  Braces: N/A  Respiratory Status: Room air     Functional Mobility:  Bed Mobility:     Rolling Left:  maximal assistance  Scooting to EOB: total assistance and of 2 persons  Supine to Sit: maximal assistance  Sit to Supine: maximal assistance  Balance:   Static Sitting: CGA-Min A; multidirectional swaying       AM-PAC 6 CLICK MOBILITY  Turning over in bed (including adjusting bedclothes, sheets and blankets)?: 2  Sitting down on and standing up from a chair with arms (e.g., wheelchair, bedside commode, etc.): 2  Moving from lying on back to sitting on the side of the bed?: 2  Need to walk in hospital room?: 1  Climbing 3-5 steps with a railing?: 1       Treatment & Education:  EOB Sitting x5 min with CGA-Min A with VCs to maintain; pt spontaneously began to return to supine 2/2 onset of headache  Patient educated on role of therapy, goals of session, and benefits of mobilizing.   All questions answered within PT scope of practice.      Patient left supine with all lines intact, call button in reach, nurse notified, and caregiver present..    GOALS:   Multidisciplinary Problems       Physical Therapy Goals          Problem: Physical Therapy    Goal Priority Disciplines Outcome Goal Variances Interventions   Physical Therapy Goal     PT, PT/OT Ongoing, Progressing     Description: Goals to be met by:      Patient will increase functional independence with mobility by performin. Supine to sit with contact guard Assistance  2. Sit to supine with contact guard Assistance  3. Sit to stand transfer with Minimal Assistance  4. Bed to chair transfer with Moderate Assistance using LRAD.   5. Sitting at edge of bed x10 minutes with Contact Guard Assistance                         Time Tracking:     PT Received On: 23  PT Start Time: 944     PT Stop Time: 958  PT Total Time (min): 14 min     Billable Minutes: Neuromuscular Re-education 14    Treatment Type: Treatment  PT/PTA: PT     Number of  PTA visits since last PT visit: 0     09/11/2023

## 2023-09-11 NOTE — PT/OT/SLP PROGRESS
Occupational Therapy   Treatment    Name: Selma Lux  MRN: [id number]  Admitting Diagnosis:  Discharge planning issues       Recommendations:     Discharge Recommendations: nursing facility, skilled      Assessment:     Selma Lux is a 87 y.o. female with a medical diagnosis of Discharge planning issues.  Performance deficits affecting function are weakness, impaired endurance, impaired self care skills, impaired functional mobility, gait instability, impaired balance, decreased upper extremity function, decreased lower extremity function, decreased safety awareness, impaired cognition, decreased coordination.   Pt tolerated session well.   Rehab Prognosis:  Good; patient would benefit from acute skilled OT services to address these deficits and reach maximum level of function.       Plan:     Patient to be seen 3 x/week to address the above listed problems via self-care/home management, therapeutic activities, therapeutic exercises  Plan of Care Expires: 10/04/23  Plan of Care Reviewed with: patient    Subjective     Pt agreeable to therapy. Pt denies pain.     Pain/Comfort:  Pain Rating 1: 0/10    Objective:     Communicated with: nsg prior to session.  Patient found supine in bed with caregiver in room.  Cotx completed this date to optimize functional performance and safety.     General Precautions: Standard, fall      Occupational Performance:     Bed Mobility:    Supine <> sit with TOTAL A x 2      Activities of Daily Living:  Sitter reports pt is now able to feed self  Pt demo set-up wash face and hands while seated EOB. Pt did required MIN A for postural control during this task, but anticipate only set-up in supported sitting.   UE/LE dressing; MAX A     [facility] 6 Click ADL: 10    Treatment & Education:  Pt awake , alert and following commands. Pt's caregiver from home in room throughout session.  Pt tolerated sitting EOB approx 5 min with CGA to MIN A for postural control. Pt with postural  sway with multidirectional instability.   Education provided for pt to achieve  upright cervical extension, anterior pelvic tilt and scapular retraction to promote upright functional posture. Pt able to achieve this posture but only able to hold it for 2-3 seconds at a time due to fatigue.  After approx 5 min EOB, pt suddenly leaning to right onto her elbow and stating she needed to lay down. Pt without reason and did not give warning for initiating this transition.     Education provided re: OT POC and safety with functional mobility/ADl skills.     Patient left HOB elevated with all lines intact, call button in reach, and caregiver present    GOALS:   Multidisciplinary Problems       Occupational Therapy Goals          Problem: Occupational Therapy    Goal Priority Disciplines Outcome Interventions   Occupational Therapy Goal     OT, PT/OT Ongoing, Progressing    Description: Goals to be met by: 9/18/23     Patient will increase functional independence with ADLs by performing:    Feeding with Set-up Assistance.  UE Dressing with Minimal Assistance.  LE Dressing with Moderate Assistance.  Grooming while EOB with Stand-by Assistance.  Toileting from bedside commode with Moderate Assistance for hygiene and clothing management.   Toilet transfer to bedside commode with Moderate Assistance.  Upper extremity exercise program x20 reps per handout, with assistance as needed.                         Time Tracking:     OT Date of Treatment: 09/11/23  OT Start Time: 0944  OT Stop Time: 0958  OT Total Time (min): 14 min    Billable Minutes:Self Care/Home Management 14    OT/GENE: OT          9/11/2023

## 2023-09-11 NOTE — PROGRESS NOTES
09/11/23 0800   WOCN Assessment   WOCN Total Time (mins) 20   Visit Date 09/11/23   Visit Time 0800   Consult Type New   WOCN Speciality Wound   Intervention assessed;changed;applied;chart review;coordination of care;orders        Altered Skin Integrity 08/31/23 2300 Left Buttocks Partial thickness tissue loss. Shallow open ulcer with a red or pink wound bed, without slough. Intact or Open/Ruptured Serum-filled blister.   Date First Assessed/Time First Assessed: 08/31/23 2300   Altered Skin Integrity Present on Admission - Did Patient arrive to the hospital with altered skin?: yes  Side: Left  Location: Buttocks  Description of Altered Skin Integrity: Partial thickness...   Wound Image    Description of Altered Skin Integrity Partial thickness tissue loss. Shallow open ulcer with a red or pink wound bed, without slough. Intact or Open/Ruptured Serum-filled blister.   Dressing Appearance Open to air   Drainage Amount None   Red (%), Wound Tissue Color 100 %   Periwound Area Intact;Dry   Wound Edges Defined;Irregular   Wound Length (cm) 5 cm   Wound Width (cm) 3 cm   Wound Depth (cm) 0.1 cm   Wound Volume (cm^3) 1.5 cm^3   Wound Surface Area (cm^2) 15 cm^2     [facility] - Observation 11H  Wound Care    Patient Name:  Selma Lux   MRN:  [id number]  Date: 9/11/2023  Diagnosis: Discharge planning issues    History:     Past Medical History:   Diagnosis Date    Acute cystitis without hematuria 2/27/2020    Acute hypoxemic respiratory failure 4/11/2018    Acute respiratory failure with hypoxia 3/2/2020    KERMIT (acute kidney injury) 3/13/2019    Altered mental status     Anemia     Arthritis     Bilateral hand pain 3/14/2018    Branch retinal vein occlusion, left eye 2/20/2015    Chronic bilateral low back pain without sciatica 3/23/2017    Chronic renal failure in pediatric patient, stage 3 (moderate) 4/15/2018    Chronic renal failure syndrome, stage 3 (moderate) 4/15/2018    Chronic systolic (congestive) heart  failure 8/6/2021    Last Assessment & Plan:  Formatting of this note might be different from the original. 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 Hospitalization late 2021, w/ rehab to follow (no notes available) --12/13/2021-CT head revealed thin extra-axial hyperdense collection overlying the right cerebral convexity compatible with acute subdural hematoma, neurosurgery was consulted, patient was noted with Keppra 500 mg b.i.d., apixaban was held -12/19/    Iron deficiency anemia secondary to inadequate dietary iron intake 6/24/2013    Joint pain     Keratoconjunctivitis sicca of both eyes not specified as Sjogren's 7/29/2016    Leiomyoma of uterus, unspecified 9/16/2014    Long QT interval 6/29/2016    Due to medication (plaquenil)     Macular edema 1/12/2015    Multinodular goiter 1/12/2016    Neuropathy 8/23/2017    Plaquenil causing adverse effect in therapeutic use 10/7/2016    Pneumococcal vaccine refused 8/17/2016    Pneumonia due to Streptococcus pneumoniae 4/5/2018    Poor nutrition 12/23/2018    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/2015    RA (rheumatoid arthritis) 12/13/2021    -managed by rhematology, since this is a duplicate  problem list entry, will archive this     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       Social History     Socioeconomic History    Marital status:    Tobacco Use    Smoking status: Never     Passive exposure: Never    Smokeless tobacco: Never   Substance and Sexual Activity    Alcohol use: Yes     Alcohol/week: 0.0 standard drinks of alcohol     Comment: very seldom     Drug use: No    Sexual activity: Not Currently     Comment: ,  age 52,    Other Topics Concern    Are you pregnant or think you may be? No    Breast-feeding No   Social History Narrative    ** Merged History Encounter **          Social Determinants of Health     Financial Resource Strain: Low Risk  (7/23/2023)    Overall Financial Resource Strain (CARDIA)     Difficulty of Paying Living Expenses: Not hard at all   Food Insecurity: No Food Insecurity (7/23/2023)    Hunger Vital Sign     Worried About Running Out of Food in the Last Year: Never true     Ran Out of Food in the Last Year: Never true   Transportation Needs: No Transportation Needs (7/23/2023)    PRAPARE - Transportation     Lack of Transportation (Medical): No     Lack of Transportation (Non-Medical): No   Physical Activity: Insufficiently Active (7/23/2023)    Exercise Vital Sign     Days of Exercise per Week: 2 days     Minutes of Exercise per Session: 30 min   Stress: Stress Concern Present (7/23/2023)    [employer] [place] [employer] - Occupational Stress Questionnaire     Feeling of Stress : To some extent   Social Connections: Unknown (7/23/2023)    Social Connection and Isolation Panel [NHANES]      Frequency of Communication with Friends and Family: More than three times a week     Frequency of Social Gatherings with Friends and Family: More than three times a week     Active Member of Clubs or Organizations: Yes     Attends Club or Organization Meetings: 1 to 4 times per year     Marital Status:    Housing Stability: Low Risk  (7/23/2023)    Housing Stability Vital Sign     Unable to Pay for Housing in the Last Year: No     Number of Places Lived in the Last Year: 1     Unstable Housing in the Last Year: No       Precautions:     Allergies as of 08/31/2023    (No Known Allergies)       [facility] Assessment Details/Treatment   Patient consult for wound care to left buttocks, the patient need assistance to reposition herself while in bed. A waffle mattress ordered. Patient have heel protector's in place. The treatment to the left buttocks to cleanse with soap and water pat dry apply triad paste twice a day and prn.    Recommendations made to primary team for  the above  Orders placed.     09/11/2023

## 2023-09-11 NOTE — SUBJECTIVE & OBJECTIVE
Interval History: Pt seen and examined this morning on willie DICKINSON. Pleasantly demented. Care plan reviewed. Otherwise, doing well and with no further complaints at this time.      Objective:     Vital Signs (Most Recent):  Temp: 96.7 °F (35.9 °C) (09/11/23 1153)  Pulse: 80 (09/11/23 1153)  Resp: 18 (09/11/23 1153)  BP: 122/60 (09/11/23 1153)  SpO2: 98 % (09/11/23 1153) Vital Signs (24h Range):  Temp:  [96.7 °F (35.9 °C)-99.5 °F (37.5 °C)] 96.7 °F (35.9 °C)  Pulse:  [64-87] 80  Resp:  [17-20] 18  SpO2:  [94 %-99 %] 98 %  BP: ()/(46-66) 122/60     Weight: 64.2 kg (141 lb 8.6 oz)  Body mass index is 24.29 kg/m².    Intake/Output Summary (Last 24 hours) at 9/11/2023 1434  Last data filed at 9/10/2023 2149  Gross per 24 hour   Intake 250 ml   Output 250 ml   Net 0 ml           Physical Exam  Gen: in NAD, appears stated age  Neuro: intermittently confused, CN2-12 grossly intact BL; motor, sensory, and strength grossly intact BL  HEENT: NTNC, EOMI, PERRLA, MMM; no thyromegaly or lymphadenopathy; no JVD appreciated  CVS: RRR, no m/r/g; S1/S2 auscultated with no S3 or S4; capillary refill < 2 sec  Resp: lungs CTAB, no w/r/r; no belabored breathing or accessory muscle use appreciated   Abd: BS+ in all 4 quadrants; NTND, soft to palpation; no organomegaly appreciated   Extrem: pulses full, equal, and regular over all 4 extremities; no UE or LE edema BL      Significant Labs: All pertinent labs within the past 24 hours have been reviewed.    Significant Imaging: I have reviewed all pertinent imaging results/findings within the past 24 hours.

## 2023-09-11 NOTE — PLAN OF CARE
Ms. Cisse from [facility] called CM back. [facility] She asked if referral can be faxed to [phone]. CM faxed referral.   CM received a called from Frances @ [facility]. She states that patient does not have a rehab diagnosis. Cm notifed Dr. Slater.    CM called daughter Rosy. CM gave her the update from [facility]. Daughter states she does not understand that patient does not have a rehab dx. CM stated that CM had to manually faxed referral to [facility]. CM asked have she thought about [facility] care. Daughter asked if Medicare will pay for it. CM explained that they will have to complete Medicaid application. Daughter states that patient will not qualify  for Medicaid. CM states that they can do private pay. Daughter states that patient patient was able to feed herself about 2 weeks ago.   CM called [facility] cell phone number. Unable to leave voice message due to voice mailbox full. CM called the facility [facility] for [facility] but had to leave a message. Irena Yang, RN

## 2023-09-11 NOTE — PHYSICIAN QUERY
PT Name: Selma Lux  MR #: [id number]     DOCUMENTATION CLARIFICATION     CDS/:  Shawn Marques RN, CDS                   Contact Information:  [email or web address]    This form is a permanent document in the medical record.     Query Date: September 11, 2023    By submitting this query, we are merely seeking further clarification of documentation.  Please utilize your independent clinical judgment when addressing the question(s) below.    The Medical Record contains the following:   Indicators   Supporting Clinical Findings Location in Medical Record    Non-blanchable erythema/redness     X Ulcer/Injury/Skin Breakdown Partial thickness tissue loss. Shallow open ulcer with a red or pink wound bed, without slough. Intact or Open/Ruptured Serum-filled blister.   Wound Care PN 9/11    Deep Tissue Injury     X Wound care consult Date First Assessed/Time First Assessed: 08/31/23 2300   Altered Skin Integrity Present on Admission - Did Patient arrive to the hospital with altered skin?: yes  Side: Left  Location: Buttocks  Description of Altered Skin Integrity: Partial thickness...    Dressing Appearance:   Open to air  Drainage Amount:          None  Red (%), Wound Tissue Color 100 %  Periwound Area:          Intact;Dry  Wound Edges:              Deffined;Irregular    Patient consult for wound care to left buttocks, the patient need assistance to reposition herself while in bed. A waffle mattress ordered. Patient have heel protector's in place. The treatment to the left buttocks to cleanse with soap and water pat dry apply triad paste twice a day and prn.       Wound Care PN 9/11   X Acute/Chronic Illness Generalized weakness     Heart failure with preserved ejection fraction     Vascular Dementia     Mixed hyperlipidemia     Thrombocytopenia     AF (paroxysmal atrial fibrillation)     Rheumatoid arthritis of multiple sites     PN 9/9   X Medication/Treatment Routine Skin for Bedridden Patients:   Apply moisture barrier cream to all HM POC 9/11     X Other smallskin tear noted to left buttock and a small fluid filled blister noted to left groin region.safety precautions maintained    Pt voided incontinently in bed.also has a large soft dark brown stool.pt cleaned.entire bed linen changed.barrier cream applied.new purewick applied    Prior to admission, patients level of function was requiring light assist to perform pivot transfer to w/c or BSC. Dependent with w/c propulsion. Patient was transferring into garden tub with caregiver assist 3 days/week. Patient was independent with self-feeding. She required assist for dressing.  Equipment used at home: bedside commode, walker, rolling, wheelchair, cane, quad, cane, straight.  DME owned (not currently used): rolling walker    Patient noted to be soiled with BM at start of session. Performed sit to stand for pericare and pad change. On return to supine and replacement of purewick- patient noted to be actively having BM   NN 9/3      NN 9/8        PT Eval 9/4     The clinical guidelines noted are only a system guideline. It does not replace the providers clinical judgment.    Per the National Pressure Injury Advisory Panel:   A pressure injury is localized damage to the skin and underlying soft tissue usually over a bony prominence or related to a medical or other device. The injury can present as intact skin or an open ulcer and may be painful. The injury occurs as a result of intense and/or prolonged pressure or pressure in combination with shear. The tolerance of soft tissue for pressure and shear may also be affected by microclimate, nutrition, perfusion, co-morbidities and condition of the soft tissue.       Stage 1 Pressure Injury:  Intact skin with a localized area of non-blanchable erythema, which may appear differently in darkly pigmented skin. Color changes do not include purple or maroon discoloration; these may indicate deep tissue pressure injury.     Stage 2 Pressure Injury:  Partial-thickness loss of skin with exposed dermis. The wound bed is viable, pink or red, moist, and may also present as an intact or ruptured serum-filled blister.    Stage 3 Pressure Injury:  Full-thickness loss of skin, in which adipose (fat) is visible in the ulcer and granulation tissue and epibole (rolled wound edges) are often present. Slough and/or eschar may be visible. Undermining and tunneling may occur.    Stage 4 Pressure Injury:  Full-thickness skin and tissue loss with exposed or directly palpable fascia, muscle, tendon, ligament, cartilage or bone in the ulcer. Slough and/or eschar may be visible. Epibole (rolled edges), undermining and/or tunneling often occur.    Unstageable Pressure Injury:  Full-thickness skin and tissue loss in which the extent of tissue damage within the ulcer cannot be confirmed because it is obscured by slough or eschar. If slough or eschar is removed, a Stage 3 or Stage 4 pressure injury will be revealed.        Provider, please provide the integumentary diagnosis related to the documentation of Left Buttock:     [  x ] Pressure Injury/Decubitus Ulcer, Stage 2     [   ] Other Integumentary Diagnosis (please specify):______________           Please document in your progress notes daily for the duration of treatment until resolved and include in your discharge summary.    Reference:    RANJITH Durbin., Jono, DEDRA SIERRA., Goldberg, M., ROSEMARIE Keene., ROSEMARIE Abrams., & RIGO Hoffman. (2016). Revised National Pressure Ulcer Advisory Panel Pressure Injury Staging System: Revised Pressure Injury Staging System. J Wound Ostomy Continence Nurs, 43(6), 585-597. doi:10.1097/[email or web address]    Form No.[id number]

## 2023-09-11 NOTE — PLAN OF CARE
NURSING HOME ORDERS    09/11/2023  [facility]  SELENA GARCIA - OBSERVATION 11H  [address]  [facility] [address]  Dept: [phone]  Loc: [phone]     Admit to Nursing Home:  Skilled Nursing Facility    Diagnoses:  Active Hospital Problems    Diagnosis  POA    *Discharge planning issues [Z02.9]  Not Applicable     Priority: 1 - High    Generalized weakness [R53.1]  Yes     Priority: 2     Chronic obstructive pulmonary disease [J44.9]  Yes    Heart failure with preserved ejection fraction [I50.30]  Yes    Vascular Dementia [F01.A0]  Yes     Chronic     -neurology assessment 7/30/2018, 8/18/2020  -9/8/2017 Neuropsychiatry note Dr. Lagos      Mixed hyperlipidemia [E78.2]  Yes    Thrombocytopenia [D69.6]  Yes     Chronic     -monitor periodically w/ CBC      AF (paroxysmal atrial fibrillation) [I48.0]  Yes     Chronic     -followed by Cardiology, on Eliquis  -PAF noted on admission circa 4/2/2018 for respiratory failure (PNA +/- )      Rheumatoid arthritis of multiple sites [M05.79]  Yes     Chronic     -Dx 2012 with Dr No, then Elenakem  HCQ since 2012  Prednisone 5 mg/d since 2012 previously (doses changed at times due to other conditions, pulmonary)  Humira one dose April 2018 followed by ICU admission for pnemonia and resp failure    -Managed by rheumatology          Resolved Hospital Problems    Diagnosis Date Resolved POA    Somnolence [R40.0] 09/06/2023 No    COPD (chronic obstructive pulmonary disease) [J44.9] 09/04/2023 Yes     Chronic     - followed by Pulmonary Medicine      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 Echo  August 2019 showed decreased LVEF at 40% with some evidence of left-sided heart disease.  12/16/2022 EF 65%, DD.    -The clinical picture currently is more suggestive of WHO 2  pulmonary hypertension that is pretty mild.  There may be an element of WHO 3 pulmonary hypertension at times of more deranged gas exchange.    -followed in cardiology and Pulmonary Clinics      KERMIT (acute kidney injury) [N17.9] 09/07/2023 Yes    Hypertension, essential [I10] 09/02/2023 Yes     Chronic       Patient is homebound due to:  Discharge planning issues    Allergies:Review of patient's allergies indicates:  No Known Allergies    Vitals:  Routine    Diet: cardiac diet    Activities:   Activity as tolerated    Goals of Care Treatment Preferences:  Code Status: Full Code    Living Will: Yes     What is most important right now is to focus on extending life as long as possible, even it it means sacrificing quality, curative/life-prolongation (regardless of treatment burdens).  Accordingly, we have decided that the best plan to meet the patient's goals includes continuing with treatment.      Labs:  Per facility routine    Nursing Precautions:  Fall and Pressure ulcer prevention    Consults:   PT to evaluate and treat- 5 times a week and OT to evaluate and treat- 5 times a week     Miscellaneous Care: Routine Skin for Bedridden Patients:  Apply moisture barrier cream to all                   Diabetes Care:  NA      Medications: Discontinue all previous medication orders, if any. See new list below.     Medication List        CHANGE how you take these medications      predniSONE 10 MG tablet  Commonly known as: DELTASONE  Take 4 tabs (40 mg) by mouth once daily for 3 days, THEN 3 tabs (30 mg)once daily for 3 days, THEN 2 tabs (20 mg)once daily for 3 days, THEN 1 tab (10 mg)once daily for 3 days, THEN 0.5 tabs (5 mg) once daily for 3 days.  Start taking on: September 7, 2023  What changed: See the new instructions.            CONTINUE taking these medications      albuterol-ipratropium 2.5 mg-0.5 mg/3 mL nebulizer solution  Commonly known as: DUO-NEB  Take 3 mLs by nebulization 2 (two) times daily as needed for  Shortness of Breath. Rescue     apixaban 2.5 mg Tab  Commonly known as: ELIQUIS  Take 1 tablet (2.5 mg total) by mouth 2 (two) times daily.     atorvastatin 40 MG tablet  Commonly known as: LIPITOR  Take 1 tablet (40 mg total) by mouth every evening.     baclofen 10 MG tablet  Commonly known as: LIORESAL  Take 0.5 tablets (5 mg total) by mouth with lunch AND 0.5 tablets (5 mg total) daily with dinner or evening meal AND 1 tablet (10 mg total) every evening.     cholecalciferol (vitamin D3) 125 mcg (5,000 unit) capsule  Take 1 capsule (5,000 Units total) by mouth once daily.     ferrous sulfate, dried 160 mg (50 mg iron) Tbsr  Commonly known as: SLOW FE  Take 1 tablet (160 mg total) by mouth every other day.     fluticasone-salmeterol 100-50 mcg/dose 100-50 mcg/dose diskus inhaler  Commonly known as: ADVAIR  Inhale 1 puff into the lungs 2 (two) times daily. Controller     * gabapentin 300 MG capsule  Commonly known as: NEURONTIN  Take 1 capsule (300 mg total) by mouth every evening.     * gabapentin 100 MG capsule  Commonly known as: NEURONTIN  Take 1 capsule (100 mg total) by mouth 2 (two) times daily.     gabapentin 5% baclofen 2% amitriptyline 2% topical cream  Apply topically 3 (three) times daily.     hydrOXYchloroQUINE 200 mg tablet  Commonly known as: PLAQUENIL  Take 1 tablet (200 mg total) by mouth 2 (two) times daily.     LIDOcaine 5 %  Commonly known as: LIDODERM  Place 1 patch onto the skin once daily. Remove & Discard patch within 12 hours as needed for pain or as directed by MD Place patch onto lower back as needed     magnesium oxide 400 mg (241.3 mg magnesium) tablet  Commonly known as: MAG-OX  Take 400 mg by mouth once daily.     mycophenolate 500 mg Tab  Commonly known as: CELLCEPT  Take 1 tablet (500 mg total) by mouth 2 (two) times daily.     pantoprazole 40 MG tablet  Commonly known as: PROTONIX  Take 1 tablet (40 mg total) by mouth once daily.     sulfamethoxazole-trimethoprim 800-160mg 800-160  mg Tab  Commonly known as: BACTRIM DS  One tablet by mouth every Monday, Wednesday, Friday to prevent lung infection     thiamine 100 MG tablet  Take 100 mg by mouth once daily.           * This list has 2 medication(s) that are the same as other medications prescribed for you. Read the directions carefully, and ask your doctor or other care provider to review them with you.                ASK your doctor about these medications      QUEtiapine 25 MG Tab  Commonly known as: SEROQUEL  Take 1 tablet (25 mg total) by mouth nightly as needed (insomnia).                Immunizations Administered as of 9/11/2023       Name Date Dose VIS Date Route Exp Date    COVID-19, MRNA, LN-S, PF (Pfizer) (Purple Cap) 9/10/2021 10:14 AM 0.3 mL 12/12/2020 Intramuscular 10/31/2021    Site: Left deltoid     Given By: Cely Valencia RN     : [employer]     Lot: [id number]     COVID-19, MRNA, LN-S, PF (Pfizer) (Purple Cap) 1/30/2021  2:24 PM 0.3 mL 12/12/2020 Intramuscular 5/31/2021    Site: Right deltoid     Given By: Jonah Pierre MA     : [employer]     Lot: [id number]     COVID-19, MRNA, LN-S, PF (Pfizer) (Purple Cap) 1/9/2021  1:40 PM 0.3 mL 12/12/2020 Intramuscular 4/30/2021    Site: Left arm     Given By: Isaac Rubio RN     : [employer]     Lot: [id number]             Skyler Slater MD  Attending Physician  Medical Director - [facility]  Department of Hospital Medicine  9/11/2023

## 2023-09-11 NOTE — PROGRESS NOTES
Pharmacist Renal Dose Adjustment Note    Selma Lux is a 87 y.o. female being treated with the medication Sulfamethoxazole-trimethoprim 400-80 mg tab.     Patient Data:    Vital Signs (Most Recent):  Temp: 97 °F (36.1 °C) (09/11/23 0821)  Pulse: 64 (09/11/23 0826)  Resp: 20 (09/11/23 0826)  BP: (!) 91/50 (09/11/23 0821)  SpO2: 96 % (09/11/23 0826) Vital Signs (72h Range):  Temp:  [97 °F (36.1 °C)-99.5 °F (37.5 °C)]   Pulse:  [64-95]   Resp:  [16-20]   BP: ()/(46-83)   SpO2:  [93 %-99 %]      Recent Labs   Lab 09/06/23  0437 09/07/23  0304 09/11/23 0816   CREATININE 1.4 1.4 3.4*     Serum creatinine: 3.4 mg/dL (H) 09/11/23 0816  Estimated creatinine clearance: 10.4 mL/min (A)    Sulfamethoxazole-trimethoprim 400-80 mg tab every 12 hr  will be changed to Sulfamethoxazole-trimethoprim 800-160 mg tab 1 TIW      Pharmacist's Name: Stephanie Haile  Pharmacist's Extension: [phone]

## 2023-09-11 NOTE — PLAN OF CARE
Daughter went to [facility]. CM spoke with Eddy Mayes. He is covering for Rod. CM faxed referral  to [phone]. Irena Yang RN

## 2023-09-11 NOTE — PLAN OF CARE
Cm called [facility] but had to leave a message for admission person. Natalia spoke with daughter, Ms. Rosado. She states that she went to [facility]  on Friday. She spoke with admission but their MD had left for the day. NATALIA explained  that Cm will f/u  on other referrals sent. Daughter asked at [facility]. CM  will follow up on referrals. Irena Yang RN

## 2023-09-11 NOTE — PLAN OF CARE
Problem: Adult Inpatient Plan of Care  Goal: Plan of Care Review  9/11/2023 1145 by Cristopher Rubio RN  Outcome: Ongoing, Progressing  9/11/2023 1145 by Cristopher Rubio RN  Outcome: Ongoing, Progressing  Goal: Absence of Hospital-Acquired Illness or Injury  9/11/2023 1145 by Cristopher Rubio RN  Outcome: Ongoing, Progressing  9/11/2023 1145 by Cristopher Rubio RN  Outcome: Ongoing, Progressing  Goal: Optimal Comfort and Wellbeing  9/11/2023 1145 by Cristopher Rubio RN  Outcome: Ongoing, Progressing  9/11/2023 1145 by Cristopher Rubio RN  Outcome: Ongoing, Progressing  Goal: Readiness for Transition of Care  9/11/2023 1145 by Cristopher Rubio RN  Outcome: Ongoing, Progressing  9/11/2023 1145 by Cristopher Rubio RN  Outcome: Ongoing, Progressing     Problem: Fall Injury Risk  Goal: Absence of Fall and Fall-Related Injury  9/11/2023 1145 by Cristopher Rubio RN  Outcome: Ongoing, Progressing  9/11/2023 1145 by Cristopher Rubio RN  Outcome: Ongoing, Progressing     Problem: Behavior Regulation Impairment (Dementia Signs/Symptoms)  Goal: Improved Behavioral Control (Dementia Signs/Symptoms)  9/11/2023 1145 by Cristopher Rubio RN  Outcome: Ongoing, Progressing  9/11/2023 1145 by Cristopher Rubio RN  Outcome: Ongoing, Progressing     Problem: Cognitive Impairment (Dementia Signs/Symptoms)  Goal: Optimized Cognitive Function (Dementia Signs/Symptoms)  9/11/2023 1145 by Cristopher Rubio RN  Outcome: Ongoing, Progressing  9/11/2023 1145 by Cristopher Rubio RN  Outcome: Ongoing, Progressing     Problem: Mood Impairment (Dementia Signs/Symptoms)  Goal: Improved Mood Symptoms (Dementia Signs/Symptoms)  9/11/2023 1145 by Cristopher Rubio RN  Outcome: Ongoing, Progressing  9/11/2023 1145 by Cristopher Rubio RN  Outcome: Ongoing, Progressing     Problem: Nutrition Imbalance (Dementia Signs/Symptoms)  Goal: Optimized Nutrition Intake (Dementia Signs/Symptoms)  9/11/2023 1145 by Cristopher Rubio RN  Outcome: Ongoing, Progressing  9/11/2023 1145 by  Cristopher Rubio RN  Outcome: Ongoing, Progressing     Problem: Social or Functional Impairment (Dementia Signs/Symptoms)  Goal: Enhanced Social or Functional Skills and Ability (Dementia Signs/Symptoms)  9/11/2023 1145 by Cristopher Rubio RN  Outcome: Ongoing, Progressing  9/11/2023 1145 by Cristopher Rubio RN  Outcome: Ongoing, Progressing     Problem: Sleep Disturbance (Dementia Signs/Symptoms)  Goal: Improved Sleep (Dementia Signs/Symptoms)  9/11/2023 1145 by Cristopher Rubio RN  Outcome: Ongoing, Progressing  9/11/2023 1145 by Cristopher Rubio RN  Outcome: Ongoing, Progressing     Problem: COPD (Chronic Obstructive Pulmonary Disease) Comorbidity  Goal: Maintenance of COPD Symptom Control  9/11/2023 1145 by Cristopher Rubio RN  Outcome: Ongoing, Progressing  9/11/2023 1145 by Cristopher Rubio RN  Outcome: Ongoing, Progressing     Problem: Heart Failure Comorbidity  Goal: Maintenance of Heart Failure Symptom Control  9/11/2023 1145 by Cristopher Rubio RN  Outcome: Ongoing, Progressing  9/11/2023 1145 by Cristopher Rubio RN  Outcome: Ongoing, Progressing     Problem: Dysrhythmia  Goal: Normalized Cardiac Rhythm  9/11/2023 1145 by Cristopher Rubio RN  Outcome: Ongoing, Progressing  9/11/2023 1145 by Cristopher Rubio RN  Outcome: Ongoing, Progressing     Problem: Mobility Impairment  Goal: Optimal Mobility  9/11/2023 1145 by Cristopher Rubio RN  Outcome: Ongoing, Progressing  9/11/2023 1145 by Cristopher Rubio RN  Outcome: Ongoing, Progressing

## 2023-09-11 NOTE — PLAN OF CARE
CM spoke with daughter Rosy. CM gave her update on the facilities.   Dion Reynolds- unable to leave Rod a voice message because his voice mail is full   Daughter aware that referrals was sent to [facility] and [facility]. Daughter does not want [facility]. [facility]    Darius- NATALIA left voice message for Ms. Cisse   [facility] has not made a decision at this time.   CM asked about private care at home. Daughter states that its from 9am-6pm or (4:30 pm if they come home early from work). Weekend is for about 4-5 hours. They do not have night time coverage with private sitters. Daughter states  that patient use to be able to transfer from chair to commode but now she is total care. Daughter states that they can not physically lift patient. They have medical issues. CM notified Dr. Slater.  Irena Yang, RN

## 2023-09-12 PROBLEM — L24.A2 IRRITANT CONTACT DERMATITIS DUE TO FECAL, URINARY OR DUAL INCONTINENCE: Status: ACTIVE | Noted: 2023-01-01

## 2023-09-12 NOTE — PLAN OF CARE
Requested documentation sent to [facility] on their website for pt appeal.   Will continue to update plan as needed.  Adrien Kolb RN,BSN

## 2023-09-12 NOTE — SUBJECTIVE & OBJECTIVE
Interval History: Pt seen and examined this morning on willie DICKINSON. Pleasantly demented. Care plan reviewed. Otherwise, doing well and with no further complaints at this time.      Objective:     Vital Signs (Most Recent):  Temp: 97 °F (36.1 °C) (09/12/23 1032)  Pulse: 85 (09/12/23 1032)  Resp: 18 (09/12/23 1032)  BP: (!) 142/62 (09/12/23 1032)  SpO2: 97 % (09/12/23 1032) Vital Signs (24h Range):  Temp:  [96.2 °F (35.7 °C)-97.8 °F (36.6 °C)] 97 °F (36.1 °C)  Pulse:  [72-88] 85  Resp:  [17-18] 18  SpO2:  [95 %-100 %] 97 %  BP: (122-151)/(56-69) 142/62     Weight: 64.2 kg (141 lb 8.6 oz)  Body mass index is 24.29 kg/m².    Intake/Output Summary (Last 24 hours) at 9/12/2023 1147  Last data filed at 9/12/2023 0615  Gross per 24 hour   Intake 360 ml   Output 600 ml   Net -240 ml           Physical Exam  Gen: in NAD, appears stated age  Neuro: intermittently confused, CN2-12 grossly intact BL; motor, sensory, and strength grossly intact BL  HEENT: NTNC, EOMI, PERRLA, MMM; no thyromegaly or lymphadenopathy; no JVD appreciated  CVS: RRR, no m/r/g; S1/S2 auscultated with no S3 or S4; capillary refill < 2 sec  Resp: lungs CTAB, no w/r/r; no belabored breathing or accessory muscle use appreciated   Abd: BS+ in all 4 quadrants; NTND, soft to palpation; no organomegaly appreciated   Extrem: pulses full, equal, and regular over all 4 extremities; no UE or LE edema BL      Significant Labs: All pertinent labs within the past 24 hours have been reviewed.    Significant Imaging: I have reviewed all pertinent imaging results/findings within the past 24 hours.

## 2023-09-12 NOTE — NURSING
Patient is awake alert and oriented to self.  Patient able to feed self after tray set up and take pills whole. Did not complain of any pain or discomfort.

## 2023-09-12 NOTE — PROGRESS NOTES
Established Patient - Audio Only Telehealth Visit with [facility] Provider  Dr Génesis Rosario (Attending)     The patient location is: HOME  The chief complaint leading to consultation is: routine care  Visit type: Virtual visit with audio only (telephone)     The reason for the audio only service rather than synchronous audio and video virtual visit was related to technical difficulties or patient preference/necessity.     Each patient to whom I provide medical services by telemedicine is:  (1) informed of the relationship between the physician and patient and the respective role of any other health care provider with respect to management of the patient; and (2) notified that they may decline to receive medical services by telemedicine and may withdraw from such care at any time. Patient verbally consented to receive this service via voice-only telephone call.       Subjective:      Patient ID: Selma Lux is a 87 y.o. female.    Patient is in the hospital at the moment. Called her daughter, left voice message at 1:00 PM 23.      Prior to this visit, patient's last encounter with PCP was 2023. She was placed in SNF today. Daughter states they were turned down by numerous facilities due to lack of staffing. They are unable to care for her in the home.    She is going to [facility] for SNF. They are also considering longterm placement at [facility].    She sings the praises of Dr Pena for inpatient care.      4Ms for Medical Decision-Making in Older Adults    Last Completed EAWV: 2021    MOBILITY:  Get Up and Go:      2021    10:14 AM   Get Up and Go   Trial 1 0 seconds     Activities of Daily Livin/25/2021    10:19 AM   Activities of Daily Living   Ambulation Assistance Required   Ambulation Assistance Walker (wheeled)   Dressing Assistance Required   Dressing Assistance Minimum   Transfers Independent   Toileting Continent of bladder;Continent of bowel    Feeding Independent   Cleaning home/Chores Assistance Required   Telephone use Independent   Shopping Assistance Required   Paying bills Assistance Required   Taking meds Assistance Required     Whisper Test:      2021    10:15 AM   Whisper Test   Whisper Test Normal     Disability Status:      2021    10:25 AM   Disability Status   Are you deaf or do you have serious difficulty hearing? N   Are you blind or do you have serious difficulty seeing, even when wearing glasses? N   Because of a physical, mental, or emotional condition, do you have serious difficulty concentrating, remembering, or making decisions? Y   Do you have serious difficulty walking or climbing stairs? Y   Do you have difficulty dressing or bathing? Y   Because of a physical, mental, or emotional condition, do you have difficulty doing errands alone such as visiting a doctor's office or shopping? Y     Nutrition Screenin/25/2021    10:18 AM   Nutrition Screening   Has food intake declined over the past three months due to loss of appetite, digestive problems, chewing or swallowing difficulties? No decrease in food intake   Involuntary weight loss during the last 3 months? No weight loss   Mobility? Goes out   Has the patient suffered psychological stress or acute disease in the past three months? No   Neuropsychological problems? No psychological problems   Body Mass Index (BMI)?  BMI 23 or greater   Screening Score 14    Screening Score: 0-7 Malnourished, 8-11 At Risk, 12-14 Normal    MENTATION:   Depression Patient Health Questionnaire:      2023     8:54 AM   Depression Patient Health Questionnaire   Over the last two weeks how often have you been bothered by little interest or pleasure in doing things Several days   Over the last two weeks how often have you been bothered by feeling down, depressed or hopeless More than half the days   PHQ-2 Total Score 3   PHQ-9 Total Score 9   PHQ-9 Interpretation Mild     Has  Dementia Dx: Yes    Cognitive Function Screenin/25/2021    10:15 AM   Cognitive Function Screening   Clock Drawing Test 2   Mini-Cog 3 Minute Recall 2   Cognitive Function Screening 4     Cognitive Function Screening Total - Less than 4 = Abnormal,  Greater than or equal to 4 = Normal    MEDICATIONS:  High Risk Medications:  Total Active Medications: 1  baclofen - 10 MG  gabapentin - 100 MG, 300 MG  quetiapine - 12.5 MG, 12.5 MG  QUEtiapine Tab - 25 MG    WHAT MATTERS MOST:  Advance Care Planning   ACP Status:   Patient has had an ACP conversation  Living Will: Yes  Power of : No  LaPOST: No    What is most important right now is to focus on extending life as long as possible, even it it means sacrificing qualitycurative/life-prolongation (regardless of treatment burdens)    Accordingly, we have decided that the best plan to meet the patient's goals includes continuing with treatment      What matters most to patient today is: getting placed                 Social History     Socioeconomic History    Marital status:    Tobacco Use    Smoking status: Never     Passive exposure: Never    Smokeless tobacco: Never   Substance and Sexual Activity    Alcohol use: Yes     Alcohol/week: 0.0 standard drinks of alcohol     Comment: very seldom     Drug use: No    Sexual activity: Not Currently     Comment: ,  age 52,    Other Topics Concern    Are you pregnant or think you may be? No    Breast-feeding No   Social History Narrative    ** Merged History Encounter **          Social Determinants of Health     Financial Resource Strain: Low Risk  (2023)    Overall Financial Resource Strain (CARDIA)     Difficulty of Paying Living Expenses: Not hard at all   Food Insecurity: No Food Insecurity (2023)    Hunger Vital Sign     Worried About Running Out of Food in the Last Year: Never true     Ran Out of Food in the Last Year: Never true   Transportation Needs: No Transportation Needs  (7/23/2023)    PRAPARE - Transportation     Lack of Transportation (Medical): No     Lack of Transportation (Non-Medical): No   Physical Activity: Insufficiently Active (7/23/2023)    Exercise Vital Sign     Days of Exercise per Week: 2 days     Minutes of Exercise per Session: 30 min   Stress: Stress Concern Present (7/23/2023)    [employer] [place] [employer] - Occupational Stress Questionnaire     Feeling of Stress : To some extent   Social Connections: Unknown (7/23/2023)    Social Connection and Isolation Panel [NHANES]     Frequency of Communication with Friends and Family: More than three times a week     Frequency of Social Gatherings with Friends and Family: More than three times a week     Active Member of Clubs or Organizations: Yes     Attends Club or Organization Meetings: 1 to 4 times per year     Marital Status:    Housing Stability: Low Risk  (7/23/2023)    Housing Stability Vital Sign     Unable to Pay for Housing in the Last Year: No     Number of Places Lived in the Last Year: 1     Unstable Housing in the Last Year: No       Objective:     Lab Results   Component Value Date    WBC 12.69 09/05/2023    HGB 9.7 (L) 09/05/2023    HCT 32.3 (L) 09/05/2023     (L) 09/05/2023    CHOL 120 06/19/2022    TRIG 50 06/19/2022    HDL 65 06/19/2022    ALT 18 09/07/2023    AST 18 09/07/2023     09/07/2023    K 4.3 09/07/2023     (H) 09/07/2023    CREATININE 3.4 (H) 09/11/2023    BUN 32 (H) 09/07/2023    CO2 20 (L) 09/07/2023    TSH 2.441 08/31/2023    INR 1.5 06/25/2022    HGBA1C 5.3 06/20/2022         Assessment:   87 y.o. female with multiple co-morbid illnesses here to continue work-up of chronic issues.     Plan:     Problem List Items Addressed This Visit          Other    Discharge planning issues     Patient admitted to SNF (after many attempts with other SNFs)  Plan to go to [facility] today  May apply to [facility] once a private pay bed is available             Health Maintenance         Date Due Completion Date    Shingles Vaccine (1 of 2) Never done ---    Pneumococcal Vaccines (Age 65+) (2 - PPSV23 or PCV20) 05/02/2018 3/7/2018    COVID-19 Vaccine (6 - Pfizer risk series) 02/22/2023 12/28/2022    DEXA Scan 06/04/2023 6/4/2019    Influenza Vaccine (1) 09/01/2023 2/5/2020 (Declined)    Override on 2/5/2020: Declined    Lipid Panel 06/19/2027 6/19/2022    TETANUS VACCINE 03/07/2028 3/7/2018            No follow-ups on file. Thirty minutes spent with this patient today in a non-face to face encounter.    Génesis Rosario MD/MPH  Internal Medicine  [facility]  Spectra [phone]    This service was not originating from a related E/M service provided within the previous 7 days nor will  to an E/M service or procedure within the next 24 hours or my soonest available appointment.  Prevailing standard of care was able to be met in this audio-only visit.

## 2023-09-12 NOTE — PLAN OF CARE
NURSING HOME ORDERS    09/12/2023  [facility]  [facility] - OBSERVATION [address]  [address]  [place] [address]  Dept: [phone]  Loc: [phone]     Admit to Nursing Home:  Skilled Nursing Facility    Diagnoses:  Active Hospital Problems    Diagnosis  POA    *Discharge planning issues [Z02.9]  Not Applicable     Priority: 1 - High    Generalized weakness [R53.1]  Yes     Priority: 2     Chronic obstructive pulmonary disease [J44.9]  Yes    Heart failure with preserved ejection fraction [I50.30]  Yes    Vascular Dementia [F01.A0]  Yes     Chronic     -neurology assessment 7/30/2018, 8/18/2020  -9/8/2017 Neuropsychiatry note Dr. Lagos      Mixed hyperlipidemia [E78.2]  Yes    Thrombocytopenia [D69.6]  Yes     Chronic     -monitor periodically w/ CBC      AF (paroxysmal atrial fibrillation) [I48.0]  Yes     Chronic     -followed by Cardiology, on Eliquis  -PAF noted on admission circa 4/2/2018 for respiratory failure (PNA +/- )      Rheumatoid arthritis of multiple sites [M05.79]  Yes     Chronic     -Dx 2012 with Dr No, then Elenakem  HCQ since 2012  Prednisone 5 mg/d since 2012 previously (doses changed at times due to other conditions, pulmonary)  Humira one dose April 2018 followed by ICU admission for pnemonia and resp failure    -Managed by rheumatology          Resolved Hospital Problems    Diagnosis Date Resolved POA    Somnolence [R40.0] 09/06/2023 No    COPD (chronic obstructive pulmonary disease) [J44.9] 09/04/2023 Yes     Chronic     - followed by Pulmonary Medicine      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 Echo  August 2019 showed decreased LVEF at 40% with some evidence of left-sided heart disease.  12/16/2022 EF 65%, DD.    -The clinical picture currently is more suggestive of WHO 2  pulmonary hypertension that is pretty mild.  There may be an element of WHO 3 pulmonary hypertension at times of more deranged gas exchange.    -followed in cardiology and Pulmonary Clinics      KERMIT (acute kidney injury) [N17.9] 09/07/2023 Yes    Hypertension, essential [I10] 09/02/2023 Yes     Chronic       Patient is homebound due to:  Discharge planning issues    Allergies:Review of patient's allergies indicates:  No Known Allergies    Vitals:  Routine    Diet: cardiac diet    Activities:   Activity as tolerated    Goals of Care Treatment Preferences:  Code Status: Full Code    Living Will: Yes     What is most important right now is to focus on extending life as long as possible, even it it means sacrificing quality, curative/life-prolongation (regardless of treatment burdens).  Accordingly, we have decided that the best plan to meet the patient's goals includes continuing with treatment.      Labs:  Per facility routine    Nursing Precautions:  Fall and Pressure ulcer prevention    Consults:   PT to evaluate and treat- 5 times a week and OT to evaluate and treat- 5 times a week     Miscellaneous Care: Routine Skin for Bedridden Patients:  Apply moisture barrier cream to all                   Diabetes Care:  NA      Medications: Discontinue all previous medication orders, if any. See new list below.     Medication List        CHANGE how you take these medications      predniSONE 10 MG tablet  Commonly known as: DELTASONE  Take 4 tabs (40 mg) by mouth once daily for 3 days, THEN 3 tabs (30 mg)once daily for 3 days, THEN 2 tabs (20 mg)once daily for 3 days, THEN 1 tab (10 mg)once daily for 3 days, THEN 0.5 tabs (5 mg) once daily for 3 days.  Start taking on: September 7, 2023  What changed: See the new instructions.            CONTINUE taking these medications      albuterol-ipratropium 2.5 mg-0.5 mg/3 mL nebulizer solution  Commonly known as: DUO-NEB  Take 3 mLs by nebulization 2 (two) times daily as needed for  Shortness of Breath. Rescue     apixaban 2.5 mg Tab  Commonly known as: ELIQUIS  Take 1 tablet (2.5 mg total) by mouth 2 (two) times daily.     atorvastatin 40 MG tablet  Commonly known as: LIPITOR  Take 1 tablet (40 mg total) by mouth every evening.     baclofen 10 MG tablet  Commonly known as: LIORESAL  Take 0.5 tablets (5 mg total) by mouth with lunch AND 0.5 tablets (5 mg total) daily with dinner or evening meal AND 1 tablet (10 mg total) every evening.     cholecalciferol (vitamin D3) 125 mcg (5,000 unit) capsule  Take 1 capsule (5,000 Units total) by mouth once daily.     ferrous sulfate, dried 160 mg (50 mg iron) Tbsr  Commonly known as: SLOW FE  Take 1 tablet (160 mg total) by mouth every other day.     fluticasone-salmeterol 100-50 mcg/dose 100-50 mcg/dose diskus inhaler  Commonly known as: ADVAIR  Inhale 1 puff into the lungs 2 (two) times daily. Controller     * gabapentin 300 MG capsule  Commonly known as: NEURONTIN  Take 1 capsule (300 mg total) by mouth every evening.     * gabapentin 100 MG capsule  Commonly known as: NEURONTIN  Take 1 capsule (100 mg total) by mouth 2 (two) times daily.     gabapentin 5% baclofen 2% amitriptyline 2% topical cream  Apply topically 3 (three) times daily.     hydrOXYchloroQUINE 200 mg tablet  Commonly known as: PLAQUENIL  Take 1 tablet (200 mg total) by mouth 2 (two) times daily.     LIDOcaine 5 %  Commonly known as: LIDODERM  Place 1 patch onto the skin once daily. Remove & Discard patch within 12 hours as needed for pain or as directed by MD Place patch onto lower back as needed     magnesium oxide 400 mg (241.3 mg magnesium) tablet  Commonly known as: MAG-OX  Take 400 mg by mouth once daily.     mycophenolate 500 mg Tab  Commonly known as: CELLCEPT  Take 1 tablet (500 mg total) by mouth 2 (two) times daily.     pantoprazole 40 MG tablet  Commonly known as: PROTONIX  Take 1 tablet (40 mg total) by mouth once daily.     sulfamethoxazole-trimethoprim 800-160mg 800-160  mg Tab  Commonly known as: BACTRIM DS  One tablet by mouth every Monday, Wednesday, Friday to prevent lung infection     thiamine 100 MG tablet  Take 100 mg by mouth once daily.           * This list has 2 medication(s) that are the same as other medications prescribed for you. Read the directions carefully, and ask your doctor or other care provider to review them with you.                ASK your doctor about these medications      QUEtiapine 25 MG Tab  Commonly known as: SEROQUEL  Take 1 tablet (25 mg total) by mouth nightly as needed (insomnia).                Immunizations Administered as of 9/12/2023       Name Date Dose VIS Date Route Exp Date    COVID-19, MRNA, LN-S, PF (Pfizer) (Purple Cap) 9/10/2021 10:14 AM 0.3 mL 12/12/2020 Intramuscular 10/31/2021    Site: Left deltoid     Given By: Cely Valencia RN     : [employer]     Lot: [id number]     COVID-19, MRNA, LN-S, PF (Pfizer) (Purple Cap) 1/30/2021  2:24 PM 0.3 mL 12/12/2020 Intramuscular 5/31/2021    Site: Right deltoid     Given By: Jonah Pierre MA     : [employer]     Lot: [id number]     COVID-19, MRNA, LN-S, PF (Pfizer) (Purple Cap) 1/9/2021  1:40 PM 0.3 mL 12/12/2020 Intramuscular 4/30/2021    Site: Left arm     Given By: Isaac Rubio RN     : Pf[employer] Inc     Lot: [id number]             Skyler Slater MD  Attending Physician  Medical Director - [facility]  Department of Hospital Medicine  9/12/2023

## 2023-09-12 NOTE — SUBJECTIVE & OBJECTIVE
Scheduled Meds:   apixaban  2.5 mg Oral BID    atorvastatin  40 mg Oral QHS    fluticasone furoate-vilanteroL  1 puff Inhalation Daily    hydroxychloroquine  200 mg Oral BID    magnesium oxide  400 mg Oral Daily    pantoprazole  40 mg Oral Daily    polyethylene glycol  17 g Oral BID    [START ON 9/16/2023] predniSONE  10 mg Oral Daily    [START ON 9/13/2023] predniSONE  20 mg Oral Daily    QUEtiapine  12.5 mg Oral QHS    senna-docusate 8.6-50 mg  1 tablet Oral BID    [START ON 9/13/2023] sulfamethoxazole-trimethoprim 800-160mg  1 tablet Oral Every Mon, Wed, Fri     Continuous Infusions:  PRN Meds:acetaminophen, acetaminophen, dextrose 10%, dextrose 10%, glucagon (human recombinant), glucose, glucose, hydrALAZINE, melatonin, naloxone, ondansetron, QUEtiapine, sodium chloride 0.9%, traZODone    Review of patient's allergies indicates:  No Known Allergies     Past Medical History:   Diagnosis Date    Acute cystitis without hematuria 2/27/2020    Acute hypoxemic respiratory failure 4/11/2018    Acute respiratory failure with hypoxia 3/2/2020    KERMIT (acute kidney injury) 3/13/2019    Altered mental status     Anemia     Arthritis     Bilateral hand pain 3/14/2018    Branch retinal vein occlusion, left eye 2/20/2015    Chronic bilateral low back pain without sciatica 3/23/2017    Chronic renal failure in pediatric patient, stage 3 (moderate) 4/15/2018    Chronic renal failure syndrome, stage 3 (moderate) 4/15/2018    Chronic systolic (congestive) heart failure 8/6/2021    Last Assessment & Plan:  Formatting of this note might be different from the original. 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 Hospitalization late 2021, w/ rehab to follow (no notes available) --12/13/2021-CT head revealed thin extra-axial hyperdense collection overlying the right cerebral convexity compatible with acute subdural hematoma, neurosurgery was consulted, patient was noted with Keppra 500 mg b.i.d., apixaban was held -12/19/    Iron deficiency anemia secondary to inadequate dietary iron intake 6/24/2013    Joint pain     Keratoconjunctivitis sicca of both eyes not specified as Sjogren's 7/29/2016    Leiomyoma of uterus, unspecified 9/16/2014    Long QT interval 6/29/2016    Due to medication (plaquenil)     Macular edema 1/12/2015    Multinodular goiter 1/12/2016    Neuropathy 8/23/2017    Plaquenil causing adverse effect in therapeutic use 10/7/2016    Pneumococcal vaccine refused 8/17/2016    Pneumonia due to Streptococcus pneumoniae 4/5/2018    Poor nutrition 12/23/2018    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/2015    RA (rheumatoid arthritis) 12/13/2021    -managed by rhematology, since this is a duplicate problem list entry, will archive this     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     Past Surgical History:   Procedure  Laterality Date    BREAST CYST EXCISION      CATARACT EXTRACTION      COLONOSCOPY N/A 9/29/2015    Procedure: COLONOSCOPY;  Surgeon: FIDELINA Sanchez MD;  Location: [facility] ENDO (4TH FLR);  Service: Endoscopy;  Laterality: N/A;    EYE SURGERY      INJECTION OF ANESTHETIC AGENT AROUND NERVE Left 4/19/2021    Procedure: BLOCK, NERVE, FEMORAL AND OBTURATOR;  Surgeon: Shivam Gonzalez MD;  Location: [facility] PAIN MGT;  Service: Pain Management;  Laterality: Left;  consent needed    JOINT REPLACEMENT      right knee    KNEE SURGERY Left 12/31/2013    TKR    left parotidectomy      mixed tumor    IL EVAL,SWALLOW FUNCTION,CINE/VIDEO RECORD  6/5/2021         SALIVARY GLAND SURGERY      TONSILLECTOMY      UPPER GASTROINTESTINAL ENDOSCOPY         Family History       Problem Relation (Age of Onset)    Breast cancer Maternal Grandmother    Cancer Father    Heart disease Mother    Hypertension Mother    Hyperthyroidism Mother, Other    Prostate cancer Father          Tobacco Use    Smoking status: Never     Passive exposure: Never    Smokeless tobacco: Never   Substance and Sexual Activity    Alcohol use: Yes     Alcohol/week: 0.0 standard drinks of alcohol     Comment: very seldom     Drug use: No    Sexual activity: Not Currently     Comment: ,  age 52,      Review of Systems   Skin:  Positive for wound.       Objective:     Vital Signs (Most Recent):  Temp: 97 °F (36.1 °C) (09/12/23 1032)  Pulse: 85 (09/12/23 1032)  Resp: 18 (09/12/23 1032)  BP: (!) 142/62 (09/12/23 1032)  SpO2: 97 % (09/12/23 1032) Vital Signs (24h Range):  Temp:  [96.2 °F (35.7 °C)-97.8 °F (36.6 °C)] 97 °F (36.1 °C)  Pulse:  [72-88] 85  Resp:  [17-18] 18  SpO2:  [95 %-100 %] 97 %  BP: (122-151)/(56-69) 142/62     Weight: 64.2 kg (141 lb 8.6 oz)  Body mass index is 24.29 kg/m².     Physical Exam  Constitutional:       Appearance: Normal appearance.   Skin:     General: Skin is warm and dry.      Findings: Lesion present.   Neurological:      Mental Status:  She is alert.          Laboratory:  All pertinent labs reviewed within the last 24 hours.    Diagnostic Results:  None

## 2023-09-12 NOTE — PLAN OF CARE
Pt accepted to [facility] for a 9/13/23 admission per Rod Spears in admissions. Per his request CM contacted pt's daughter Rosy to report, she will follow up to schedule paperwork. Team notified via secure chat, required documentation emailed to Rod.      09/12/23 [id number]   Post-Acute Status   Post-Acute Authorization Placement   Post-Acute Placement Status Pending post-acute provider review/more information requested   Discharge Plan   Discharge Plan A Skilled Nursing Facility   Discharge Plan B Skilled Nursing Facility     Adrien Kolb, RN,BSN

## 2023-09-12 NOTE — HPI
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 Patient is accompanied by her daughter who provides much of the history although the patient is able to answer questions about ROS.      In the ED,  Patient hypotensive on admit but rapidly corrected without fluid however patient did eventually receive a L of LR and pip/tazo plus vanc for empiric sepsis. Blood cultures drawn and pending. Lab w/u performed in ED revealed stable anemia without leukocytosis. CXR clear. UA not c/w with UTI. MRI and CT head unrevealing for any acute change. Patient did have what appears to be an KERMIT on her CMP although her creatinine has been fluctuating at a higher level than prior to may 2023. Patient admitted to hospital medicine service for further evaluation. Skin integrity JEFF consulted for evaluation of skin injury.

## 2023-09-12 NOTE — ASSESSMENT & PLAN NOTE
- consult received for evaluation of skin injury.  - pt presented with an acute on chronic mental status change.  - partial thickness tissue loss noted to left buttocks. Wound previously documented as a stage 2 pressure injury but after assessment the wound is related to moisture incontinence.  - pt is incontinent of urine with a female external urinary catheter in place. Wound has pink base with undefined edges.   - continue Triad bid/prn.  - abraham surface utilized.  - wedge/boots for offloading.  - foam heel protectors also in place.  - pt turned well with caregiver's assistance.  - encouraged to turn q2h.  - nursing to maintain pressure injury prevention measures and continue wound care per orders.

## 2023-09-12 NOTE — CONSULTS
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 Patient is accompanied by her daughter who provides much of the history although the patient is able to answer questions about ROS.      In the ED,  Patient hypotensive on admit but rapidly corrected without fluid however patient did eventually receive a L of LR and pip/tazo plus vanc for empiric sepsis. Blood cultures drawn and pending. Lab w/u performed in ED revealed stable anemia without leukocytosis. CXR clear. UA not c/w with UTI. MRI and CT head unrevealing for any acute change. Patient did have what appears to be an KERMIT on her CMP although her creatinine has been fluctuating at a higher level than prior to may 2023. Patient admitted to hospital medicine service for further evaluation. Skin integrity JEFF consulted for evaluation of skin injury.      Scheduled Meds:   apixaban  2.5 mg Oral BID    atorvastatin  40 mg Oral QHS    fluticasone furoate-vilanteroL  1 puff Inhalation Daily    hydroxychloroquine  200 mg Oral BID    magnesium oxide  400 mg Oral Daily    pantoprazole  40 mg Oral Daily    polyethylene glycol  17 g Oral BID    [START ON 9/16/2023] predniSONE  10 mg Oral Daily    [START ON 9/13/2023] predniSONE  20 mg Oral Daily    QUEtiapine  12.5 mg Oral QHS    senna-docusate 8.6-50 mg  1 tablet Oral BID    [START ON 9/13/2023] sulfamethoxazole-trimethoprim 800-160mg  1 tablet Oral Every Mon, Wed, Fri     Continuous  Infusions:  PRN Meds:acetaminophen, acetaminophen, dextrose 10%, dextrose 10%, glucagon (human recombinant), glucose, glucose, hydrALAZINE, melatonin, naloxone, ondansetron, QUEtiapine, sodium chloride 0.9%, traZODone    Review of patient's allergies indicates:  No Known Allergies     Past Medical History:   Diagnosis Date    Acute cystitis without hematuria 2/27/2020    Acute hypoxemic respiratory failure 4/11/2018    Acute respiratory failure with hypoxia 3/2/2020    KERMIT (acute kidney injury) 3/13/2019    Altered mental status     Anemia     Arthritis     Bilateral hand pain 3/14/2018    Branch retinal vein occlusion, left eye 2/20/2015    Chronic bilateral low back pain without sciatica 3/23/2017    Chronic renal failure in pediatric patient, stage 3 (moderate) 4/15/2018    Chronic renal failure syndrome, stage 3 (moderate) 4/15/2018    Chronic systolic (congestive) heart failure 8/6/2021    Last Assessment & Plan:  Formatting of this note might be different from the original. -last ANTOLIN was done on 03/01/2020:  Grade 1 mild left ventricular diastolic dysfunction    Cognitive communication deficit 12/19/2017    COPD exacerbation 1/23/2022    Cystoid macular edema, left eye 2/20/2015    Cystoid macular edema, left eye 2/20/2015    Delirium 12/30/2021    DJD (degenerative joint disease) of cervical spine 5/15/2013    Enterococcal bacteremia     Fatty liver 8/26/2014    Goiter 4/9/2018    Hashimoto's disease     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    History of 2019 novel coronavirus disease (COVID-19) 4/11/2022 2/2022    Hypertension     Hypertensive encephalopathy     IBS (irritable bowel syndrome) 6/21/2017    IGT (impaired glucose tolerance) 1/12/2016    Intracranial subdural hematoma 1/4/2022    Last Assessment & Plan: Formatting of this note might be different from the original. Hospitalization late 2021, w/ rehab to follow (no notes available) --12/13/2021-CT head revealed thin extra-axial  hyperdense collection overlying the right cerebral convexity compatible with acute subdural hematoma, neurosurgery was consulted, patient was noted with Keppra 500 mg b.i.d., apixaban was held -12/19/    Iron deficiency anemia secondary to inadequate dietary iron intake 6/24/2013    Joint pain     Keratoconjunctivitis sicca of both eyes not specified as Sjogren's 7/29/2016    Leiomyoma of uterus, unspecified 9/16/2014    Long QT interval 6/29/2016    Due to medication (plaquenil)     Macular edema 1/12/2015    Multinodular goiter 1/12/2016    Neuropathy 8/23/2017    Plaquenil causing adverse effect in therapeutic use 10/7/2016    Pneumococcal vaccine refused 8/17/2016    Pneumonia due to Streptococcus pneumoniae 4/5/2018    Poor nutrition 12/23/2018    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/2015    RA (rheumatoid arthritis) 12/13/2021    -managed by rhematology, since this is a duplicate problem list entry, will archive this     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    s/p Right Total knee replacement 5/15/2013    Scoliosis of thoracic spine 5/15/2013    Small vessel disease, cerebrovascular 12/28/2017    Stroke     Traumatic subdural hemorrhage with loss of consciousness of unspecified duration, initial encounter 1/10/2023    12/2021    UTI (urinary tract infection) 12/29/2018    Vascular dementia 12/6/2017     Past Surgical History:   Procedure Laterality Date    BREAST CYST EXCISION      CATARACT EXTRACTION      COLONOSCOPY N/A 9/29/2015    Procedure: COLONOSCOPY;  Surgeon: FIDELINA Sanchez MD;  Location: [facility] ENDO [address]);  Service: Endoscopy;  Laterality: N/A;    EYE SURGERY      INJECTION OF ANESTHETIC AGENT AROUND NERVE Left 4/19/2021    Procedure: BLOCK, NERVE, FEMORAL AND OBTURATOR;  Surgeon: Shivam Gonzalez MD;  Location: [facility] PAIN MGT;  Service: Pain Management;  Laterality: Left;  consent needed    JOINT REPLACEMENT      right knee    KNEE SURGERY Left  12/31/2013    TKR    left parotidectomy      mixed tumor    IN EVAL,SWALLOW FUNCTION,CINE/VIDEO RECORD  6/5/2021         SALIVARY GLAND SURGERY      TONSILLECTOMY      UPPER GASTROINTESTINAL ENDOSCOPY         Family History       Problem Relation (Age of Onset)    Breast cancer Maternal Grandmother    Cancer Father    Heart disease Mother    Hypertension Mother    Hyperthyroidism Mother, Other    Prostate cancer Father          Tobacco Use    Smoking status: Never     Passive exposure: Never    Smokeless tobacco: Never   Substance and Sexual Activity    Alcohol use: Yes     Alcohol/week: 0.0 standard drinks of alcohol     Comment: very seldom     Drug use: No    Sexual activity: Not Currently     Comment: ,  age 52,      Review of Systems   Skin:  Positive for wound.       Objective:     Vital Signs (Most Recent):  Temp: 97 °F (36.1 °C) (09/12/23 1032)  Pulse: 85 (09/12/23 1032)  Resp: 18 (09/12/23 1032)  BP: (!) 142/62 (09/12/23 1032)  SpO2: 97 % (09/12/23 1032) Vital Signs (24h Range):  Temp:  [96.2 °F (35.7 °C)-97.8 °F (36.6 °C)] 97 °F (36.1 °C)  Pulse:  [72-88] 85  Resp:  [17-18] 18  SpO2:  [95 %-100 %] 97 %  BP: (122-151)/(56-69) 142/62     Weight: 64.2 kg (141 lb 8.6 oz)  Body mass index is 24.29 kg/m².     Physical Exam  Constitutional:       Appearance: Normal appearance.   Skin:     General: Skin is warm and dry.      Findings: Lesion present.   Neurological:      Mental Status: She is alert.          Laboratory:  All pertinent labs reviewed within the last 24 hours.    Diagnostic Results:  None        Assessment/Plan:         JEFF Skin Integrity Evaluation      Skin Integrity JEFF evaluation of patient as part of the comprehensive skin care team.     She has been admitted for 12 days. Skin injury was noted on 8/31/23. POA yes.    L buttock            Derm  Irritant contact dermatitis due to fecal, urinary or dual incontinence  - consult received for evaluation of skin injury.  - pt  presented with an acute on chronic mental status change.  - partial thickness tissue loss noted to left buttocks. Wound previously documented as a stage 2 pressure injury but after assessment the wound is related to moisture incontinence.  - pt is incontinent of urine with a female external urinary catheter in place. Wound has pink base with undefined edges.   - continue Triad bid/prn.  - abraham surface utilized.  - wedge/boots for offloading.  - foam heel protectors also in place.  - pt turned well with caregiver's assistance.  - encouraged to turn q2h.  - nursing to maintain pressure injury prevention measures and continue wound care per orders.        Thank you for your consult. I will follow-up with patient. Please contact us if you have any additional questions.       Daphnie Alarcon NP  Skin Integrity JEFF  [facility]

## 2023-09-12 NOTE — PLAN OF CARE
Problem: Adult Inpatient Plan of Care  Goal: Plan of Care Review  9/12/2023 0640 by Alyssa Yang LPN  Outcome: Ongoing, Progressing  9/12/2023 0639 by Alyssa Yang LPN  Outcome: Ongoing, Progressing  Goal: Absence of Hospital-Acquired Illness or Injury  9/12/2023 0640 by Alyssa Yang LPN  Outcome: Ongoing, Progressing  9/12/2023 0639 by Alyssa Yang LPN  Outcome: Ongoing, Progressing  Goal: Optimal Comfort and Wellbeing  9/12/2023 0640 by Alyssa Yang LPN  Outcome: Ongoing, Progressing  9/12/2023 0639 by Alyssa Yang LPN  Outcome: Ongoing, Progressing  Goal: Readiness for Transition of Care  9/12/2023 0640 by Alyssa Yang LPN  Outcome: Ongoing, Progressing  9/12/2023 0639 by Alyssa Yang LPN  Outcome: Ongoing, Progressing     Problem: Fall Injury Risk  Goal: Absence of Fall and Fall-Related Injury  9/12/2023 0640 by Alyssa Yang LPN  Outcome: Ongoing, Progressing  9/12/2023 0639 by Alyssa Yang LPN  Outcome: Ongoing, Progressing     Problem: Dysrhythmia  Goal: Normalized Cardiac Rhythm  9/12/2023 0640 by Alyssa Yang LPN  Outcome: Ongoing, Progressing  9/12/2023 0639 by Alyssa Yang LPN  Outcome: Ongoing, Progressing     Problem: COPD (Chronic Obstructive Pulmonary Disease) Comorbidity  Goal: Maintenance of COPD Symptom Control  9/12/2023 0640 by Alyssa Yang LPN  Outcome: Ongoing, Progressing  9/12/2023 0639 by Alyssa Yang LPN  Outcome: Ongoing, Progressing     Problem: Heart Failure Comorbidity  Goal: Maintenance of Heart Failure Symptom Control  9/12/2023 0640 by Alyssa Yang LPN  Outcome: Ongoing, Progressing  9/12/2023 0639 by Alyssa Yang LPN  Outcome: Ongoing, Progressing     Problem: Mobility Impairment  Goal: Optimal Mobility  9/12/2023 0640 by Alyssa Yang LPN  Outcome: Ongoing, Progressing  9/12/2023 0639 by Alyssa Yang LPN  Outcome: Ongoing, Progressing     Problem: Behavior Regulation Impairment (Dementia Signs/Symptoms)  Goal: Improved  Behavioral Control (Dementia Signs/Symptoms)  9/12/2023 0640 by Alyssa Yang LPN  Outcome: Ongoing, Progressing  9/12/2023 0639 by Alyssa Yang LPN  Outcome: Ongoing, Progressing     Problem: Cognitive Impairment (Dementia Signs/Symptoms)  Goal: Optimized Cognitive Function (Dementia Signs/Symptoms)  9/12/2023 0640 by Alyssa Yang LPN  Outcome: Ongoing, Progressing  9/12/2023 0639 by Alyssa Yang LPN  Outcome: Ongoing, Progressing     Problem: Mood Impairment (Dementia Signs/Symptoms)  Goal: Improved Mood Symptoms (Dementia Signs/Symptoms)  9/12/2023 0640 by Alyssa Yang LPN  Outcome: Ongoing, Progressing  9/12/2023 0639 by Alyssa Yang LPN  Outcome: Ongoing, Progressing     Problem: Nutrition Imbalance (Dementia Signs/Symptoms)  Goal: Optimized Nutrition Intake (Dementia Signs/Symptoms)  9/12/2023 0640 by Alyssa aYng LPN  Outcome: Ongoing, Progressing  9/12/2023 0639 by Alyssa Yang LPN  Outcome: Ongoing, Progressing     Problem: Sleep Disturbance (Dementia Signs/Symptoms)  Goal: Improved Sleep (Dementia Signs/Symptoms)  9/12/2023 0640 by Alyssa Yang LPN  Outcome: Ongoing, Progressing  9/12/2023 0639 by Alyssa Yang LPN  Outcome: Ongoing, Progressing     Problem: Social or Functional Impairment (Dementia Signs/Symptoms)  Goal: Enhanced Social or Functional Skills and Ability (Dementia Signs/Symptoms)  9/12/2023 0640 by Alyssa Yang LPN  Outcome: Ongoing, Progressing  9/12/2023 0639 by Alyssa Yang LPN  Outcome: Ongoing, Progressing

## 2023-09-12 NOTE — PROGRESS NOTES
[facility] [address] [place] [address] Medicine  Progress Note    Patient Name: Selma Lux  MRN: [id number]  Patient Class: IP- Inpatient   Admission Date: 8/31/2023  Length of Stay: 11 days  Attending Physician: Skyler Slater MD  Primary Care Provider: Génesis Rosario MD        Subjective:     Principal Problem:Discharge planning issues        HPI:  Patient is a 86 yo F with PMH of vascular dementia, subdural hematoma, adrenal insufficiency, hypertension, COPD, RA with ILD on hydroxychloroquine and mycophenolate, pAF who is presenting with an acute on chronic mental status change. Patient is accompanied by her daughter who provides much of the history although the patient is able to answer questions about ROS.     In the ED,  Patient hypotensive on admit but rapidly corrected without fluid however patient did eventually receive a L of LR and pip/tazo plus vanc for empiric sepsis. Blood cultures drawn and pending. Lab w/u performed in ED revealed stable anemia without leukocytosis. CXR clear. UA not c/w with UTI. MRI and CT head unrevealing for any acute change. Patient did have what appears to be an KERMIT on her CMP although her creatinine has been fluctuating at a higher level than prior to may 2023.       Overview/Hospital Course:  Extended EEGs showed nonspecific generalized slowing but no acute epileptiform activity. Steroids were restarted and escalated to stress dosing with marked improvement in somnolence. Creatinine bumped noted suspected to be 2/2 dehydration which improved w/ IVFs. Renal US showing medical renal dx. RN reported possible stone visualization on urinary excretion on 9/4/23; CT stone protocol negative for hydronephrosis or any obstructive nephrolithiasis. Pt overall sleep wake cycle improved w/ stress dosing of steroids and seroquel hs. Pt continued to do well into 9/7. She was denied by multiple SNFs, so family opted to go home with .   Update: family appealing discharge,  stating they need SNF and cannot accommodate at home with caregivers on the weekend. SW/NATALIA working on SNF placement.      Interval History: Pt seen and examined this morning on rounds. TEENA. Pleasantly demented. Care plan reviewed. Otherwise, doing well and with no further complaints at this time.      Objective:     Vital Signs (Most Recent):  Temp: 97 °F (36.1 °C) (09/12/23 1032)  Pulse: 85 (09/12/23 1032)  Resp: 18 (09/12/23 1032)  BP: (!) 142/62 (09/12/23 1032)  SpO2: 97 % (09/12/23 1032) Vital Signs (24h Range):  Temp:  [96.2 °F (35.7 °C)-97.8 °F (36.6 °C)] 97 °F (36.1 °C)  Pulse:  [72-88] 85  Resp:  [17-18] 18  SpO2:  [95 %-100 %] 97 %  BP: (122-151)/(56-69) 142/62     Weight: 64.2 kg (141 lb 8.6 oz)  Body mass index is 24.29 kg/m².    Intake/Output Summary (Last 24 hours) at 9/12/2023 1147  Last data filed at 9/12/2023 0615  Gross per 24 hour   Intake 360 ml   Output 600 ml   Net -240 ml           Physical Exam  Gen: in NAD, appears stated age  Neuro: intermittently confused, CN2-12 grossly intact BL; motor, sensory, and strength grossly intact BL  HEENT: NTNC, EOMI, PERRLA, MMM; no thyromegaly or lymphadenopathy; no JVD appreciated  CVS: RRR, no m/r/g; S1/S2 auscultated with no S3 or S4; capillary refill < 2 sec  Resp: lungs CTAB, no w/r/r; no belabored breathing or accessory muscle use appreciated   Abd: BS+ in all 4 quadrants; NTND, soft to palpation; no organomegaly appreciated   Extrem: pulses full, equal, and regular over all 4 extremities; no UE or LE edema BL      Significant Labs: All pertinent labs within the past 24 hours have been reviewed.    Significant Imaging: I have reviewed all pertinent imaging results/findings within the past 24 hours.      Assessment/Plan:      * Discharge planning issues  - Awaiting SNF acceptance, denied by multiple facilities  - PT/OT following  - SW/CM enlisted for assistance with discharge planning    Generalized weakness   MRI and CT which were both negative.  Associated w/ worsening confusion at home, sleep disruption, and inability to feed herself; acute onset in 1 week  - neurology consulted - conservative measures; do not recommend LP- family also declines LP.  - stablevit B 12; so far urine and blood cx's unremarkable  - continue steroid taper  - PT/OT following  - SW/CM enlisted for assistance with discharge planning    Heart failure with preserved ejection fraction  Appears stable clinically.     Most recent echo:   The left ventricle is normal in size with concentric hypertrophy and normal systolic function. The estimated ejection fraction is 65%.   Normal right ventricular size with normal right ventricular systolic function.   Grade II left ventricular diastolic dysfunction.   Severe left atrial enlargement.   Mild-to-moderate mitral regurgitation.   The estimated PA systolic pressure is 43 mmHg.   Normal central venous pressure (3 mmHg).               Plan:  - continue to monitor  - euvolemic  - Avoid salt intake > 2 g/day  - magnesium > 2, potassium > 4  - cardiac diet      Vascular Dementia  Doing better with 12.5 mg of seroquel at night  Can consider depakote during the daytime should pt have problematic delirium episodes    Mixed hyperlipidemia  Continue atorvastatin      Thrombocytopenia  Appears to be stable. Per PCP Dr. Lee, they were monitoring with periodic CBCs      AF (paroxysmal atrial fibrillation)  Patient with Long standing persistent (>12 months) atrial fibrillation which is uncontrolled currently with previous BB but taken off due to concern for AEs. Patient is currently in sinus rhythm.[id number] Score: 6. Anticoagulation indicated. Anticoagulation done with apixaban 2.5 BID.    Rheumatoid arthritis of multiple sites  Takes mycophenolate and hydroxychloroquine at home for ILD and connective tissue manifestatons of RA  - Continue HCQ, myco; prophy. bactrim      VTE Risk Mitigation (From admission, onward)         Ordered     apixaban  tablet 2.5 mg  2 times daily         08/31/23 2153     IP VTE HIGH RISK PATIENT  Once         08/31/23 2153     Place sequential compression device  Until discontinued         08/31/23 2153                Discharge Planning   LETICIA: 9/12/2023     Code Status: Full Code   Is the patient medically ready for discharge?: Yes    Reason for patient still in hospital (select all that apply): Patient trending condition  Discharge Plan A: (P) Skilled Nursing Facility            Skyler Slater MD  Attending Physician  Medical Director - [facility]ation [facility]  Department of Hospital Medicine  9/12/2023

## 2023-09-12 NOTE — ASSESSMENT & PLAN NOTE
Patient admitted to SNF (after many attempts with other SNFs)  · Plan to go to [facility] today  · May apply to [facility] once a private pay bed is available

## 2023-09-13 NOTE — PT/OT/SLP PROGRESS
Physical Therapy Treatment  Co-treatment with OT due to acuity of condition, level of skilled assist needed for assessment of safety with mobility.   Patient Name:  Selma Lux   MRN:  [id number]    Recommendations:     Discharge Recommendations: nursing facility, skilled  Discharge Equipment Recommendations: to be determined by next level of care  Barriers to discharge:  patient below functional baseline    Assessment:     Selma Lux is a 87 y.o. female admitted with a medical diagnosis of Discharge planning issues.  She presents with the following impairments/functional limitations: weakness, impaired balance, impaired endurance, impaired self care skills, impaired functional mobility, gait instability, decreased upper extremity function, decreased lower extremity function, decreased coordination, impaired cognition, decreased safety awareness, impaired coordination, impaired fine motor. The patient demo'd impaired coordination of UE/LE/and trunk  with spontaneous athetoid movements of extremities. She showed improvement in static sitting balance, decreased spontaneous weight shift and postural sway, contact guard assist to minimum assistance, she sat edge of bed ~5 min. Patient stood with moderate assistance using RW, took 3 sidesteps with moderate assistance. Returned to bed as she began having BM. She is not safe to return home due to risk of falls. Patient confused with delayed command following. She would benefit from SNF placement to address the above deficits and maximize their functional mobility.     Rehab Prognosis: Fair; patient would benefit from acute skilled PT services to address these deficits and reach maximum level of function.    Recent Surgery: * No surgery found *      Plan:     During this hospitalization, patient to be seen 3 x/week to address the identified rehab impairments via gait training, therapeutic activities, therapeutic exercises, neuromuscular re-education and  "progress toward the following goals:    Plan of Care Expires:  10/04/23    Subjective     Chief Complaint: "I feel like I'm about to enem-ize myself"  Patient/Family Comments/goals: DC to SNF  Pain/Comfort:  Pain Rating 1: 0/10      Objective:     Communicated with RN prior to session.  Patient found HOB elevated with peripheral IV, PureWick, telemetry upon PT entry to room. Caregiver at bedside.     General Precautions: Standard, fall  Orthopedic Precautions: N/A  Braces: N/A  Respiratory Status: Room air     Functional Mobility:    Bed Mobility  Rolling to R and L: moderate assistance   Supine to Sit on the R side:  moderate assistance x2  Sit to supine: moderate assistancex 2  Scooting to EOB in sitting: moderate assistance    Transfers Sit to Stand: moderate assistance  with RW   Gait Ambulated 3 steps to L with RW  Level of assist: moderate assistance   Description: kyphotic posture, impaired weight shift, short shuffling steps, decreased standing tolerance, started having BM          AM-PAC 6 CLICK MOBILITY  Turning over in bed (including adjusting bedclothes, sheets and blankets)?: 2  Sitting down on and standing up from a chair with arms (e.g., wheelchair, bedside commode, etc.): 2  Moving from lying on back to sitting on the side of the bed?: 2  Moving to and from a bed to a chair (including a wheelchair)?: 2  Need to walk in hospital room?: 1  Climbing 3-5 steps with a railing?: 1  Basic Mobility Total Score: 10       Treatment & Education:  Patient and caregiver educated on:  -role of therapy  -goals of session  -PT POC  -benefits of out of bed mobility and consequences of immobility  -calling for staff assist to mobilize safely  Patient agreeable to mobilize with therapy.      Sitting edge of bed ~5 minutes, contact guard assist to minimum assistance , weight shift posterior  -Posterior pelvic tilt, kyphosis, cervical flexion, patient with increased postural sway, multiple spontaneous losses of balance " posteriorly   -Visual/verbal/manual cues for midline orientation, thoracic and cervical extension  -Patient given graded postural support to promote core activation, and to elicit patient's righting reactions  -Performed ADLs in sitting with OT, PT facilitating balance    Standing balance, minimum assistance with RW, for 2 minutes  -Demonstrates trunk/hip/knee flexion in standing with posterior lateral lean  -Manual/verbal cues and facilitation for hip extension, trunk extension, cues to look up  -Manual/verbal cues for quad and glute engagement    Gait training: cued for upright posture, reciprocal strides, cued to ambulate inside RW LYNDSEY, pacing for energy conservation     Patient encouraged to ambulate, sit up in chair 3x/day to prevent deconditioning during hospitalization. Patient verbalized understanding and agreement to mobilize only with RN assist for safety.     Patient left HOB elevated with all lines intact, call button in reach, bed alarm on, and caregiver present..    GOALS:   Multidisciplinary Problems       Physical Therapy Goals          Problem: Physical Therapy    Goal Priority Disciplines Outcome Goal Variances Interventions   Physical Therapy Goal     PT, PT/OT Ongoing, Progressing     Description: Goals to be met by:      Patient will increase functional independence with mobility by performin. Supine to sit with contact guard Assistance  2. Sit to supine with contact guard Assistance  3. Sit to stand transfer with Minimal Assistance  4. Bed to chair transfer with Moderate Assistance using LRAD.   5. Sitting at edge of bed x10 minutes with Contact Guard Assistance                         Time Tracking:     PT Received On: 23  PT Start Time: 1026     PT Stop Time: 1039  PT Total Time (min): 13 min     Billable Minutes: Neuromuscular Re-education 10    Treatment Type: Treatment  PT/PTA: PT     Number of PTA visits since last PT visit: 0     2023

## 2023-09-13 NOTE — PT/OT/SLP PROGRESS
Occupational Therapy   Treatment    Name: Selma Lux  MRN: [id number]  Admitting Diagnosis:  Discharge planning issues       Recommendations:     Discharge Recommendations: nursing facility, skilled  Discharge Equipment Recommendations:  to be determined by next level of care  Barriers to discharge:       Assessment:     Selma Lux is a 87 y.o. female with a medical diagnosis of Discharge planning issues. Pt D/Cd to SNF this date. Performance deficits affecting function are weakness, impaired balance, decreased safety awareness, impaired endurance, impaired self care skills, gait instability, decreased coordination.     Rehab Prognosis:  Good; patient would benefit from acute skilled OT services to address these deficits and reach maximum level of function.       Plan:     Patient to be seen 3 x/week to address the above listed problems via self-care/home management, therapeutic activities, therapeutic exercises  Plan of Care Expires: 10/04/23  Plan of Care Reviewed with: patient, caregiver    Subjective     Pain/Comfort:  Pain Rating 1: 0/10    Objective:     Communicated with: rn prior to session.  Patient found supine with parikh catheter, telemetry upon OT entry to room.    General Precautions: Standard, fall    Orthopedic Precautions:N/A  Braces: N/A  Respiratory Status: Room air     Occupational Performance:     Bed Mobility:    Patient completed Supine to Sit with moderate assistance  Patient completed Sit to Supine with moderate assistance     Functional Mobility/Transfers:  Patient completed Sit <> Stand Transfer with moderate assistance  with  rolling walker   Functional Mobility: Took a few sidesteps with Mod A using a RW.    Activities of Daily Living:  Pt declined.    [facility] 6 Click ADL: 12    Treatment & Education:  Discussed OT POC.    Patient left supine with all lines intact and call button in reach    GOALS:   Multidisciplinary Problems       Occupational Therapy Goals           Problem: Occupational Therapy    Goal Priority Disciplines Outcome Interventions   Occupational Therapy Goal     OT, PT/OT Ongoing, Progressing    Description: Goals to be met by: 9/18/23     Patient will increase functional independence with ADLs by performing:    Feeding with Set-up Assistance.  UE Dressing with Minimal Assistance.  LE Dressing with Moderate Assistance.  Grooming while EOB with Stand-by Assistance.  Toileting from bedside commode with Moderate Assistance for hygiene and clothing management.   Toilet transfer to bedside commode with Moderate Assistance.  Upper extremity exercise program x20 reps per handout, with assistance as needed.                         Time Tracking:     OT Date of Treatment: 09/13/23  OT Start Time: 1024  OT Stop Time: 1039  OT Total Time (min): 15 min    Billable Minutes:Therapeutic Activity 15    OT/GENE: OT          9/13/2023

## 2023-09-13 NOTE — PLAN OF CARE
Problem: Adult Inpatient Plan of Care  Goal: Plan of Care Review  9/13/2023 0156 by Alyssa Yang LPN  Outcome: Ongoing, Progressing  9/13/2023 0156 by Alyssa Yang LPN  Outcome: Ongoing, Progressing  Goal: Absence of Hospital-Acquired Illness or Injury  9/13/2023 0156 by Alyssa Yang LPN  Outcome: Ongoing, Progressing  9/13/2023 0156 by Alyssa Yang LPN  Outcome: Ongoing, Progressing  Goal: Optimal Comfort and Wellbeing  9/13/2023 0156 by Alyssa Yang LPN  Outcome: Ongoing, Progressing  9/13/2023 0156 by Alyssa Yang LPN  Outcome: Ongoing, Progressing  Goal: Readiness for Transition of Care  9/13/2023 0156 by Alyssa Yang LPN  Outcome: Ongoing, Progressing  9/13/2023 0156 by Alyssa Yang LPN  Outcome: Ongoing, Progressing     Problem: Fall Injury Risk  Goal: Absence of Fall and Fall-Related Injury  9/13/2023 0156 by Alyssa Yang LPN  Outcome: Ongoing, Progressing  9/13/2023 0156 by Alyssa Yang LPN  Outcome: Ongoing, Progressing     Problem: Dysrhythmia  Goal: Normalized Cardiac Rhythm  9/13/2023 0156 by Alyssa Yang LPN  Outcome: Ongoing, Progressing  9/13/2023 0156 by Alyssa Yang LPN  Outcome: Ongoing, Progressing     Problem: COPD (Chronic Obstructive Pulmonary Disease) Comorbidity  Goal: Maintenance of COPD Symptom Control  9/13/2023 0156 by Alyssa Yang LPN  Outcome: Ongoing, Progressing  9/13/2023 0156 by Alyssa Yang LPN  Outcome: Ongoing, Progressing     Problem: Heart Failure Comorbidity  Goal: Maintenance of Heart Failure Symptom Control  9/13/2023 0156 by Alyssa Yang LPN  Outcome: Ongoing, Progressing  9/13/2023 0156 by Alyssa Yang LPN  Outcome: Ongoing, Progressing     Problem: Mobility Impairment  Goal: Optimal Mobility  9/13/2023 0156 by Alyssa Yang LPN  Outcome: Ongoing, Progressing  9/13/2023 0156 by Alyssa Yang LPN  Outcome: Ongoing, Progressing     Problem: Behavior Regulation Impairment (Dementia Signs/Symptoms)  Goal: Improved  Behavioral Control (Dementia Signs/Symptoms)  9/13/2023 0156 by Alyssa Yang LPN  Outcome: Ongoing, Progressing  9/13/2023 0156 by Alyssa Yang LPN  Outcome: Ongoing, Progressing     Problem: Cognitive Impairment (Dementia Signs/Symptoms)  Goal: Optimized Cognitive Function (Dementia Signs/Symptoms)  9/13/2023 0156 by Alyssa Yang LPN  Outcome: Ongoing, Progressing  9/13/2023 0156 by Alyssa Yang LPN  Outcome: Ongoing, Progressing     Problem: Mood Impairment (Dementia Signs/Symptoms)  Goal: Improved Mood Symptoms (Dementia Signs/Symptoms)  9/13/2023 0156 by Alyssa Yang LPN  Outcome: Ongoing, Progressing  9/13/2023 0156 by Alyssa Yang LPN  Outcome: Ongoing, Progressing     Problem: Nutrition Imbalance (Dementia Signs/Symptoms)  Goal: Optimized Nutrition Intake (Dementia Signs/Symptoms)  9/13/2023 0156 by Alyssa Yang LPN  Outcome: Ongoing, Progressing  9/13/2023 0156 by Alyssa Yang LPN  Outcome: Ongoing, Progressing     Problem: Sleep Disturbance (Dementia Signs/Symptoms)  Goal: Improved Sleep (Dementia Signs/Symptoms)  9/13/2023 0156 by Alyssa Yang LPN  Outcome: Ongoing, Progressing  9/13/2023 0156 by Alyssa Yang LPN  Outcome: Ongoing, Progressing     Problem: Social or Functional Impairment (Dementia Signs/Symptoms)  Goal: Enhanced Social or Functional Skills and Ability (Dementia Signs/Symptoms)  9/13/2023 0156 by Alyssa Yang LPN  Outcome: Ongoing, Progressing  9/13/2023 0156 by Alyssa Yang LPN  Outcome: Ongoing, Progressing

## 2023-09-13 NOTE — PLAN OF CARE
NURSING HOME ORDERS    09/13/2023  [facility]  [facility] [address]  [address]  [facility] [address]  Dept: [phone]  Loc: [phone]     Admit to Nursing Home:  Skilled Nursing Facility    Diagnoses:  Active Hospital Problems    Diagnosis  POA    *Discharge planning issues [Z02.9]  Not Applicable     Priority: 1 - High    Generalized weakness [R53.1]  Yes     Priority: 2     Irritant contact dermatitis due to fecal, urinary or dual incontinence [L24.A2]  Yes    Chronic obstructive pulmonary disease [J44.9]  Yes    Heart failure with preserved ejection fraction [I50.30]  Yes    Vascular Dementia [F01.A0]  Yes     Chronic     -neurology assessment 7/30/2018, 8/18/2020  -9/8/2017 Neuropsychiatry note Dr. Lagos      Mixed hyperlipidemia [E78.2]  Yes    Thrombocytopenia [D69.6]  Yes     Chronic     -monitor periodically w/ CBC      AF (paroxysmal atrial fibrillation) [I48.0]  Yes     Chronic     -followed by Cardiology, on Eliquis  -PAF noted on admission circa 4/2/2018 for respiratory failure (PNA +/- )      Rheumatoid arthritis of multiple sites [M05.79]  Yes     Chronic     -Dx 2012 with Dr No, then Elenakem  HCQ since 2012  Prednisone 5 mg/d since 2012 previously (doses changed at times due to other conditions, pulmonary)  Humira one dose April 2018 followed by ICU admission for pnemonia and resp failure    -Managed by rheumatology          Resolved Hospital Problems    Diagnosis Date Resolved POA    Somnolence [R40.0] 09/06/2023 No    COPD (chronic obstructive pulmonary disease) [J44.9] 09/04/2023 Yes     Chronic     - followed by Pulmonary Medicine      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 Echo  August 2019 showed decreased LVEF at 40% with some evidence of left-sided heart disease.   12/16/2022 EF 65%, DD.    -The clinical picture currently is more suggestive of WHO 2 pulmonary hypertension that is pretty mild.  There may be an element of WHO 3 pulmonary hypertension at times of more deranged gas exchange.    -followed in cardiology and Pulmonary Clinics      KERMIT (acute kidney injury) [N17.9] 09/07/2023 Yes    Hypertension, essential [I10] 09/02/2023 Yes     Chronic       Patient is homebound due to:  Discharge planning issues    Allergies:Review of patient's allergies indicates:  No Known Allergies    Vitals:  Routine    Diet: cardiac diet    Activities:   Activity as tolerated    Goals of Care Treatment Preferences:  Code Status: Full Code    Living Will: Yes     What is most important right now is to focus on extending life as long as possible, even it it means sacrificing quality, curative/life-prolongation (regardless of treatment burdens).  Accordingly, we have decided that the best plan to meet the patient's goals includes continuing with treatment.      Labs:  Per facility routine    Nursing Precautions:  Fall and Pressure ulcer prevention    Consults:   PT to evaluate and treat- 5 times a week and OT to evaluate and treat- 5 times a week     Miscellaneous Care: Routine Skin for Bedridden Patients:  Apply moisture barrier cream to all                   Diabetes Care:  NA      Medications: Discontinue all previous medication orders, if any. See new list below.     Medication List        CHANGE how you take these medications      predniSONE 10 MG tablet  Commonly known as: DELTASONE  Take 4 tabs (40 mg) by mouth once daily for 3 days, THEN 3 tabs (30 mg)once daily for 3 days, THEN 2 tabs (20 mg)once daily for 3 days, THEN 1 tab (10 mg)once daily for 3 days, THEN 0.5 tabs (5 mg) once daily for 3 days.  Start taking on: September 14, 2023  What changed: See the new instructions.            CONTINUE taking these medications      albuterol-ipratropium 2.5 mg-0.5 mg/3 mL nebulizer  solution  Commonly known as: DUO-NEB  Take 3 mLs by nebulization 2 (two) times daily as needed for Shortness of Breath. Rescue     apixaban 2.5 mg Tab  Commonly known as: ELIQUIS  Take 1 tablet (2.5 mg total) by mouth 2 (two) times daily.     atorvastatin 40 MG tablet  Commonly known as: LIPITOR  Take 1 tablet (40 mg total) by mouth every evening.     baclofen 10 MG tablet  Commonly known as: LIORESAL  Take 0.5 tablets (5 mg total) by mouth with lunch AND 0.5 tablets (5 mg total) daily with dinner or evening meal AND 1 tablet (10 mg total) every evening.     cholecalciferol (vitamin D3) 125 mcg (5,000 unit) capsule  Take 1 capsule (5,000 Units total) by mouth once daily.     ferrous sulfate, dried 160 mg (50 mg iron) Tbsr  Commonly known as: SLOW FE  Take 1 tablet (160 mg total) by mouth every other day.     fluticasone-salmeterol 100-50 mcg/dose 100-50 mcg/dose diskus inhaler  Commonly known as: ADVAIR  Inhale 1 puff into the lungs 2 (two) times daily. Controller     * gabapentin 300 MG capsule  Commonly known as: NEURONTIN  Take 1 capsule (300 mg total) by mouth every evening.     * gabapentin 100 MG capsule  Commonly known as: NEURONTIN  Take 1 capsule (100 mg total) by mouth 2 (two) times daily.        hydroxychloroquine 200 mg tablet  Commonly known as: PLAQUENIL  Take 1 tablet (200 mg total) by mouth 2 (two) times daily.     LIDOcaine 5 %  Commonly known as: LIDODERM  Place 1 patch onto the skin once daily. Remove & Discard patch within 12 hours as needed for pain or as directed by MD Place patch onto lower back as needed     magnesium oxide 400 mg (241.3 mg magnesium) tablet  Commonly known as: MAG-OX  Take 400 mg by mouth once daily.     mycophenolate 500 mg Tab  Commonly known as: CELLCEPT  Take 1 tablet (500 mg total) by mouth 2 (two) times daily.     pantoprazole 40 MG tablet  Commonly known as: PROTONIX  Take 1 tablet (40 mg total) by mouth once daily.     sulfamethoxazole-trimethoprim 800-160mg 800-160  mg Tab  Commonly known as: BACTRIM DS  One tablet by mouth every Monday, Wednesday, Friday to prevent lung infection     thiamine 100 MG tablet  Take 100 mg by mouth once daily.           * This list has 2 medication(s) that are the same as other medications prescribed for you. Read the directions carefully, and ask your doctor or other care provider to review them with you.                           Immunizations Administered as of 9/13/2023       Name Date Dose VIS Date Route Exp Date    COVID-19, MRNA, LN-S, PF (Pfizer) (Purple Cap) 9/10/2021 10:14 AM 0.3 mL 12/12/2020 Intramuscular 10/31/2021    Site: Left deltoid     Given By: Cely Valencia, RN     : [employer]     Lot: [id number]     COVID-19, MRNA, LN-S, PF (Pfizer) (Purple Cap) 1/30/2021  2:24 PM 0.3 mL 12/12/2020 Intramuscular 5/31/2021    Site: Right deltoid     Given By: Jonah Pierre MA     : [employer]     Lot: [id number]     COVID-19, MRNA, LN-S, PF (Pfizer) (Purple Cap) 1/9/2021  1:40 PM 0.3 mL 12/12/2020 Intramuscular 4/30/2021    Site: Left arm     Given By: Isaac Rubio, MARILYN     : [employer]     Lot: [id number]             Skyler Slater MD  Attending Physician  Medical Director - [facility] [place] [facility]  Department of Hospital Medicine  9/13/2023

## 2023-09-13 NOTE — PLAN OF CARE
Received voicemail from [facility] [employer]: RE pt's appeal of discharge  [id number]. Per VM pt lost her appeal and will be responsible for her bill starting 9/13/23. Team and family notified.   Will continue to update plan as needed.  Adrien Kolb RN,BSN

## 2023-09-13 NOTE — DISCHARGE SUMMARY
[facility] - Observation [address] [place] [address] Medicine  Discharge Summary      Patient Name: Selma Lux  MRN: [id number]  JAZZMINE: [id number]  Patient Class: IP- Inpatient  Admission Date: 8/31/2023  Hospital Length of Stay: 12 days  Discharge Date and Time:  09/13/2023 11:30 AM  Attending Physician: Skyler Slater MD   Discharging Provider: Skyler Slater MD  Primary Care Provider: Génesis Rosario MD  [facility] Skyler Slater MD  Primary Care Team: [facility]    HPI:   Patient is a 88 yo F with PMH of vascular dementia, subdural hematoma, adrenal insufficiency, hypertension, COPD, RA with ILD on hydroxychloroquine and mycophenolate, pAF who is presenting with an acute on chronic mental status change. Patient is accompanied by her daughter who provides much of the history although the patient is able to answer questions about ROS.     In the ED,  Patient hypotensive on admit but rapidly corrected without fluid however patient did eventually receive a L of LR and pip/tazo plus vanc for empiric sepsis. Blood cultures drawn and pending. Lab w/u performed in ED revealed stable anemia without leukocytosis. CXR clear. UA not c/w with UTI. MRI and CT head unrevealing for any acute change. Patient did have what appears to be an KERMIT on her CMP although her creatinine has been fluctuating at a higher level than prior to may 2023.       * No surgery found *      Hospital Course:   Extended EEGs showed nonspecific generalized slowing but no acute epileptiform activity. Steroids were restarted and escalated to stress dosing with marked improvement in somnolence. Creatinine bumped noted suspected to be 2/2 dehydration which improved w/ IVFs. Renal US showing medical renal dx. RN reported possible stone visualization on urinary excretion on 9/4/23; CT stone protocol negative for hydronephrosis or any obstructive nephrolithiasis. Pt overall sleep wake cycle improved w/ stress dosing of  steroids and seroquel hs. Pt continued to do well into 9/7. She was denied by multiple SNFs, so family opted to go home with HH.   Update: family appealing discharge, stating they need SNF and cannot accommodate at home with caregivers on the weekend. SW/CM working on SNF placement.    Pt accepted to SNF on 9/13 and was subsequently discharged.       Goals of Care Treatment Preferences:  Code Status: Full Code    Living Will: Yes     What is most important right now is to focus on extending life as long as possible, even it it means sacrificing quality, curative/life-prolongation (regardless of treatment burdens).  Accordingly, we have decided that the best plan to meet the patient's goals includes continuing with treatment.      Consults:   Consults (From admission, onward)        Status Ordering Provider     Inpatient consult to PICC team (SANTOSH)  Once        Provider:  (Not yet assigned)    Completed PHIL SCHMITT     Inpatient consult to Neurology  Once        Provider:  (Not yet assigned)    Completed JUAN JOSE TUCKER          No new Assessment & Plan notes have been filed under this hospital service since the last note was generated.  Service: Hospital Medicine    Final Active Diagnoses:    Diagnosis Date Noted POA    PRINCIPAL PROBLEM:  Discharge planning issues [Z02.9] 06/20/2022 Not Applicable    Generalized weakness [R53.1] 12/30/2021 Yes    Irritant contact dermatitis due to fecal, urinary or dual incontinence [L24.A2] 09/12/2023 Yes    Chronic obstructive pulmonary disease [J44.9] 09/07/2023 Yes    Heart failure with preserved ejection fraction [I50.30] 05/15/2023 Yes    Vascular Dementia [F01.A0] 01/10/2023 Yes     Chronic    Mixed hyperlipidemia [E78.2] 10/17/2019 Yes    Thrombocytopenia [D69.6] 12/16/2018 Yes     Chronic    AF (paroxysmal atrial fibrillation) [I48.0] 06/29/2018 Yes     Chronic    Rheumatoid arthritis of multiple sites [M05.79] 10/21/2015 Yes     Chronic      Problems  Resolved During this Admission:    Diagnosis Date Noted Date Resolved POA    Somnolence [R40.0] 05/13/2023 09/06/2023 No    COPD (chronic obstructive pulmonary disease) [J44.9] 06/20/2022 09/04/2023 Yes     Chronic    Pulmonary hypertension due to interstitial lung disease [I27.23, J84.9] 03/14/2019 09/02/2023 Yes     Chronic    KERMIT (acute kidney injury) [N17.9] 03/13/2019 09/07/2023 Yes    Hypertension, essential [I10] 06/16/2016 09/02/2023 Yes     Chronic       Discharged Condition: stable    Disposition: Skilled Nursing Facility    Follow Up:   Follow-up Information     Génesis Rosario MD. Schedule an appointment as soon as possible for a visit.    Specialty: Internal Medicine  Contact information:  [address]  [facility] [address]  [phone]                       Patient Instructions:      Ambulatory referral/consult to Outpatient Case Management   Referral Priority: Routine Referral Type: Consultation   Referral Reason: Specialty Services Required   Number of Visits Requested: 1     Ambulatory referral/consult to Internal Medicine   Standing Status: Future   Referral Priority: Routine Referral Type: Consultation   Referral Reason: Specialty Services Required   Requested Specialty: Internal Medicine   Number of Visits Requested: 1     Diet Cardiac     Diet Cardiac     Notify your health care provider if you experience any of the following:  increased confusion or weakness     Notify your health care provider if you experience any of the following:  persistent dizziness, light-headedness, or visual disturbances     Notify your health care provider if you experience any of the following:  worsening rash     Notify your health care provider if you experience any of the following:  severe persistent headache     Notify your health care provider if you experience any of the following:  difficulty breathing or increased cough     Notify your health care provider if you experience any of the  following:  severe uncontrolled pain     Notify your health care provider if you experience any of the following:  persistent nausea and vomiting or diarrhea     Notify your health care provider if you experience any of the following:  temperature >100.4     Notify your health care provider if you experience any of the following:  increased confusion or weakness     Notify your health care provider if you experience any of the following:  persistent dizziness, light-headedness, or visual disturbances     Notify your health care provider if you experience any of the following:  worsening rash     Notify your health care provider if you experience any of the following:  severe persistent headache     Notify your health care provider if you experience any of the following:  difficulty breathing or increased cough     Notify your health care provider if you experience any of the following:  severe uncontrolled pain     Notify your health care provider if you experience any of the following:  persistent nausea and vomiting or diarrhea     Notify your health care provider if you experience any of the following:  temperature >100.4     Activity as tolerated     Activity as tolerated       Significant Diagnostic Studies: Labs: All labs within the past 24 hours have been reviewed    Pending Diagnostic Studies:     None         Medications:  Reconciled Home Medications:      Medication List      CHANGE how you take these medications    predniSONE 10 MG tablet  Commonly known as: DELTASONE  Take 4 tabs (40 mg) by mouth once daily for 3 days, THEN 3 tabs (30 mg)once daily for 3 days, THEN 2 tabs (20 mg)once daily for 3 days, THEN 1 tab (10 mg)once daily for 3 days, THEN 0.5 tabs (5 mg) once daily for 3 days.  Start taking on: September 7, 2023  What changed: See the new instructions.        CONTINUE taking these medications    albuterol-ipratropium 2.5 mg-0.5 mg/3 mL nebulizer solution  Commonly known as: DUO-NEB  Take 3 mLs by  nebulization 2 (two) times daily as needed for Shortness of Breath. Rescue     apixaban 2.5 mg Tab  Commonly known as: ELIQUIS  Take 1 tablet (2.5 mg total) by mouth 2 (two) times daily.     atorvastatin 40 MG tablet  Commonly known as: LIPITOR  Take 1 tablet (40 mg total) by mouth every evening.     baclofen 10 MG tablet  Commonly known as: LIORESAL  Take 0.5 tablets (5 mg total) by mouth with lunch AND 0.5 tablets (5 mg total) daily with dinner or evening meal AND 1 tablet (10 mg total) every evening.     cholecalciferol (vitamin D3) 125 mcg (5,000 unit) capsule  Take 1 capsule (5,000 Units total) by mouth once daily.     ferrous sulfate, dried 160 mg (50 mg iron) Tbsr  Commonly known as: SLOW FE  Take 1 tablet (160 mg total) by mouth every other day.     fluticasone-salmeterol 100-50 mcg/dose 100-50 mcg/dose diskus inhaler  Commonly known as: ADVAIR  Inhale 1 puff into the lungs 2 (two) times daily. Controller     * gabapentin 300 MG capsule  Commonly known as: NEURONTIN  Take 1 capsule (300 mg total) by mouth every evening.     * gabapentin 100 MG capsule  Commonly known as: NEURONTIN  Take 1 capsule (100 mg total) by mouth 2 (two) times daily.     gabapentin 5% baclofen 2% amitriptyline 2% topical cream  Apply topically 3 (three) times daily.     hydroxychloroquine 200 mg tablet  Commonly known as: PLAQUENIL  Take 1 tablet (200 mg total) by mouth 2 (two) times daily.     LIDOcaine 5 %  Commonly known as: LIDODERM  Place 1 patch onto the skin once daily. Remove & Discard patch within 12 hours as needed for pain or as directed by MD Place patch onto lower back as needed     magnesium oxide 400 mg (241.3 mg magnesium) tablet  Commonly known as: MAG-OX  Take 400 mg by mouth once daily.     mycophenolate 500 mg Tab  Commonly known as: CELLCEPT  Take 1 tablet (500 mg total) by mouth 2 (two) times daily.     pantoprazole 40 MG tablet  Commonly known as: PROTONIX  Take 1 tablet (40 mg total) by mouth once daily.      sulfamethoxazole-trimethoprim 800-160mg 800-160 mg Tab  Commonly known as: BACTRIM DS  One tablet by mouth every Monday, Wednesday, Friday to prevent lung infection     thiamine 100 MG tablet  Take 100 mg by mouth once daily.         * This list has 2 medication(s) that are the same as other medications prescribed for you. Read the directions carefully, and ask your doctor or other care provider to review them with you.            ASK your doctor about these medications    QUEtiapine 25 MG Tab  Commonly known as: SEROQUEL  Take 1 tablet (25 mg total) by mouth nightly as needed (insomnia).            Indwelling Lines/Drains at time of discharge:   Lines/Drains/Airways     None                 Time spent on the discharge of patient: 35 minutes       Skyler Slater MD  Attending Physician  Medical Director - [facility]  Department of Hospital Medicine  9/13/2023

## 2023-09-13 NOTE — PLAN OF CARE
CM was notified of placement. Patient was accepted to [facility] on 9/13/23. Report can be called to [phone] ask for ext [phone]. Pt going to room P 106.   SW arranged  ambulance  transport via [facility]. Requested  time is  1330 .  Requested  time does not guarantee arrival time.  If transport does not arrive by  1430 please contact assigned SW or on-call for assistance.    Patient's bedside nurse, daughter Rosy and Team notified of the above.     09/13/23 1227   Post-Acute Status   Post-Acute Authorization Placement   Post-Acute Placement Status Set-up Complete/Auth obtained   Hospital Resources/Appts/Education Provided Provided patient/caregiver with written discharge plan information;Appointment suggestion unavailable   Discharge Plan   Discharge Plan A Skilled Nursing Facility   Discharge Plan B Skilled Nursing Facility       Future Appointments   Date Time Provider Department Center   10/23/2023  8:00 AM Megan Chandra NP [address]   10/24/2023 11:00 AM Génesis Rosario MD [facility]   10/27/2023 11:30 AM Lonnie Rouse MD [facility]   2/27/2024  3:00 PM Satya Mckeon OD [facility] [place]     Adrien Kolb, RN,BSN

## 2023-09-18 NOTE — PROGRESS NOTES
In an attempt to complete monthly [facility] [employer] check-in, EBONIE LVM for CG and remains available.  EBONIE will make second attempt 9/20/23.

## 2023-09-27 NOTE — PROGRESS NOTES
Protocol: The [facility] Effectiveness Study  Identifier: [id number]  IRB#: 2022.247  PI: Dr. Adrien Lagos, PhD  CO-I: Dr. Laura Waller PsyD  Version Date: 12/05/2022  Pt Study ID: [id number]  Visit Month: M6  Care Plan documented on: (4/10/23) - Please refer to note for more information.      Timeline:   (*Note for CTN - complete timeline using completed or planned date for study events. Add any important events (i.e. Withdrawals, Lost to Follow Up, Adverse Events, etc.).     Consent: Completed(4/5/23)  Baseline questionnaires: Completed(4/5/23)  6-month questionnaires: Planned(9/2023)  12-month questionnaires: Planned(3/2024)        Visit Note:   Month 6 --this was  missed call. SW will make additional calls this month to schedule 6M surveys. .      Falls in the past month:   UTIs in the past month: 0  Hospital encounters in the past month (reported by caregiver): 0        Next monthly visit scheduled for: 10/3/23. Caregiver knows how to reach CTN, and is encouraged to do so, as needed before our next scheduled call.      Action items for CTN: continue to follow

## 2023-09-27 NOTE — PROGRESS NOTES
In an attempt to complete monthly [facility] [employer] check-in, EBONIE LVM for CG and remains available.  EBONIE will make third attempt 9/27/23.

## 2023-10-09 PROBLEM — D63.1 ANEMIA OF RENAL DISEASE: Status: ACTIVE | Noted: 2023-01-01

## 2023-10-09 PROBLEM — N18.9 ANEMIA OF RENAL DISEASE: Status: ACTIVE | Noted: 2023-01-01

## 2023-10-09 PROBLEM — N17.9 ACUTE KIDNEY INJURY SUPERIMPOSED ON CHRONIC KIDNEY DISEASE: Status: ACTIVE | Noted: 2023-01-01

## 2023-10-09 PROBLEM — N18.9 ACUTE KIDNEY INJURY SUPERIMPOSED ON CHRONIC KIDNEY DISEASE: Status: ACTIVE | Noted: 2023-01-01

## 2023-10-09 NOTE — ASSESSMENT & PLAN NOTE
DDx sepsis/UTI, vol depletion, possible adrenal insufficiency vs crisis albeit only taking low dose prednisone (10mg) at home.  Similar presentation to prior, per Dr Pena admitting MD and per chart review;  Associated w/ worsening confusion and decreased po intake  - Tx Sepsis/UTI as above  - fall precautions  - pt/ot  - stress dose steroids- solucortef last night --> 40 mg high dose prednisone today, will do slow taper over 2 weeks given report that she didn't tolerate a faster taper in the past

## 2023-10-09 NOTE — ASSESSMENT & PLAN NOTE
Chronic medical disease noted on renal ultrasound on last admission   Baseline seems to be under 2, currently creatinine at 2.4   Limited IV fluid infusion through the night into a.m., trend renal function  Hold off on home bactrim until renal function improves

## 2023-10-09 NOTE — HPI
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  Patient is presenting from [facility], was recently discharged from hospital stay for similar picture about a month ago.  At that time no definitive infectious etiology was found, and it was concluded the patient was possibly experiencing and adrenal crisis and/or elderly delirium/dementia with a mild KERMIT at the time.  Daughter Rosy was at the bedside now reports that she started having decreased p.o. intake over the past few days, the SNF acility had obtained a UA that was suspicious for infection and sent it off but lost the specimen.  Patient was dosed with Cipro for about 3 days with no sustained improvement in her mental status/energy level/participation/p.o. intake. Dtr reports patient only complained of fatigue.  No chest pain or shortness a breath noted.    In ED: Hypotensive 90/40, responded to IVFs.  Creatinine bump is noted at 2.4 whereas baseline is under 2.  Patient has been refusing UA catheterizations and we are still pending collection.  EKG per my personal review shows no acute ischemic findings.  Chest x-ray also does not show any convincing findings for definitive pneumonia.

## 2023-10-09 NOTE — ASSESSMENT & PLAN NOTE
Similar presentation to last time;  Associated w/ worsening confusion and decreased po intake  Elevated proc likely falsely elevated given CKD; no strong indication for abx at this point; pt doesn't appear ill but rather fatigued  - possible adrenal crisis vs KERMIT; obtain UA to r/o UTI; - Diagnosis of exclusion would be worsening dementia/delirium  - recent stablevit B 12;   - fall precautions; pt/ot  -  stress dose steroids- solucortef tonight and switch over to 40 mg high dose prednisone in AM

## 2023-10-09 NOTE — ASSESSMENT & PLAN NOTE
Takes mycophenolate and hydroxychloroquine at home for ILD and connective tissue manifestatons of RA  Stress dose steroids (eventually get down to home prednisone 10 mg)  Home plaquenil and CellCept unless infection is confirmed  Hold off on home prophylactic Bactrim until renal function improves

## 2023-10-09 NOTE — NURSING
Nurses Note -- 4 Eyes      10/9/2023   6:32 PM      Skin assessed during: Admit      [] No Altered Skin Integrity Present    []Prevention Measures Documented      [x] Yes- Altered Skin Integrity Present or Discovered   [] LDA Added if Not in Epic (Describe Wound)   [x] New Altered Skin Integrity was Present on Admit and Documented in LDA   [] Wound Image Taken    Wound Care Consulted? Yes    Attending Nurse:  MARILYN Sidhu    Second RN/Staff Member:   MARILYN Velazquez

## 2023-10-09 NOTE — H&P
[facility] - Neurosurgery ([facility]  History & Physical    Patient Name: Selma Lux  MRN: [id number]  Patient Class: IP- Inpatient  Admission Date: 10/9/2023  Attending Physician: Prashanth Pena MD   Primary Care Provider: Génesis Rosario MD         Patient information was obtained from relative(s), past medical records and ER records.     Subjective:     Principal Problem:Generalized weakness    Chief Complaint:   Chief Complaint   Patient presents with    Altered Mental Status     Pt arrives from [facility] for evaluation of AMS x 1 week. Pt is being treated for a UTI. Pt is oriented to person and time.         HPI: 88-year-old black female with chronic left-sided weakness due to history of stroke, adrenal insufficiency (on chronic steroids), rheumatoid arthritis (on Plaquenil and mycophenolate), and recent debility being admitted for generalized weakness diminishing mental status possibly secondary to metabolic encephalopathy.  Patient is presenting from [facility]; was recently discharged from hospital stay for similar picture about a month ago.  At that time no definitive infectious etiology was found, and it was concluded the patient was possibly experiencing and adrenal crisis and/or elderly delirium/dementia with a mild KERMIT at the time.  Daughter Rosy was at the bedside now reports that she started having decreased p.o. intake over the past few days, the [facility] facility had obtained a UA that was suspicious for infection and sent it off but lost the specimen.  Patient was dosed with Cipro for about 3 days with no sustained improvement in her mental status/energy level/participation/p.o. intake. Dtr reports patient only complained of fatigue.  No chest pain or shortness a breath noted.    In the ED here her vital signs were stable.  Creatinine bump is noted at 2.4 whereas baseline is under 2.  Patient has been refusing UA catheterizations and we are still pending  collection.  EKG per my personal review shows no acute ischemic findings.  Chest x-ray also does not show any convincing findings for definitive pneumonia.          Past Medical History:   Diagnosis Date    Acute cystitis without hematuria 2/27/2020    Acute hypoxemic respiratory failure 4/11/2018    Acute respiratory failure with hypoxia 3/2/2020    KERMIT (acute kidney injury) 3/13/2019    Altered mental status     Anemia     Arthritis     Bilateral hand pain 3/14/2018    Branch retinal vein occlusion, left eye 2/20/2015    Chronic bilateral low back pain without sciatica 3/23/2017    Chronic renal failure in pediatric patient, stage 3 (moderate) 4/15/2018    Chronic renal failure syndrome, stage 3 (moderate) 4/15/2018    Chronic systolic (congestive) heart failure 8/6/2021    Last Assessment & Plan:  Formatting of this note might be different from the original. 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 Hospitalization late 2021, w/ rehab to follow (no notes available) --12/13/2021-CT head revealed thin extra-axial hyperdense collection overlying the right cerebral convexity compatible with acute subdural hematoma, neurosurgery was consulted, patient was noted with  Keppra 500 mg b.i.d., apixaban was held -12/19/    Iron deficiency anemia secondary to inadequate dietary iron intake 6/24/2013    Joint pain     Keratoconjunctivitis sicca of both eyes not specified as Sjogren's 7/29/2016    Leiomyoma of uterus, unspecified 9/16/2014    Long QT interval 6/29/2016    Due to medication (plaquenil)     Macular edema 1/12/2015    Multinodular goiter 1/12/2016    Neuropathy 8/23/2017    Plaquenil causing adverse effect in therapeutic use 10/7/2016    Pneumococcal vaccine refused 8/17/2016    Pneumonia due to Streptococcus pneumoniae 4/5/2018    Poor nutrition 12/23/2018    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/2015    RA (rheumatoid arthritis) 12/13/2021    -managed by rhematology, since this is a duplicate problem list entry, will archive this     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       Past Surgical History:   Procedure Laterality Date    BREAST CYST EXCISION      CATARACT EXTRACTION      COLONOSCOPY N/A 9/29/2015    Procedure: COLONOSCOPY;  Surgeon: FIDELINA Sanchez MD;  Location: [facility] ENDO [address]);  Service: Endoscopy;  Laterality: N/A;    EYE SURGERY      INJECTION OF ANESTHETIC AGENT AROUND NERVE Left 4/19/2021    Procedure: BLOCK, NERVE, FEMORAL AND OBTURATOR;  Surgeon: Shivam Gonzalez MD;  Location: [facility] PAIN MGT;  Service: Pain Management;  Laterality: Left;  consent needed    JOINT REPLACEMENT      right knee    KNEE SURGERY Left 12/31/2013    TKR    left parotidectomy      mixed tumor    OK EVAL,SWALLOW FUNCTION,CINE/VIDEO RECORD  6/5/2021         SALIVARY GLAND SURGERY      TONSILLECTOMY      UPPER  GASTROINTESTINAL ENDOSCOPY         Review of patient's allergies indicates:  No Known Allergies    No current facility-administered medications on file prior to encounter.     Current Outpatient Medications on File Prior to Encounter   Medication Sig    albuterol-ipratropium (DUO-NEB) 2.5 mg-0.5 mg/3 mL nebulizer solution Take 3 mLs by nebulization 2 (two) times daily as needed for Shortness of Breath. Rescue    apixaban (ELIQUIS) 2.5 mg Tab Take 1 tablet (2.5 mg total) by mouth 2 (two) times daily.    atorvastatin (LIPITOR) 40 MG tablet Take 1 tablet (40 mg total) by mouth every evening.    baclofen (LIORESAL) 10 MG tablet Take 0.5 tablets (5 mg total) by mouth with lunch AND 0.5 tablets (5 mg total) daily with dinner or evening meal AND 1 tablet (10 mg total) every evening.    cholecalciferol, vitamin D3, 125 mcg (5,000 unit) capsule Take 1 capsule (5,000 Units total) by mouth once daily.    ferrous sulfate, dried (SLOW FE) 160 mg (50 mg iron) TbSR Take 1 tablet (160 mg total) by mouth every other day.    fluticasone-salmeterol diskus inhaler 100-50 mcg Inhale 1 puff into the lungs 2 (two) times daily. Controller    gabapentin (NEURONTIN) 100 MG capsule Take 1 capsule (100 mg total) by mouth 2 (two) times daily.    gabapentin (NEURONTIN) 300 MG capsule Take 1 capsule (300 mg total) by mouth every evening.    gabapentin 5% baclofen 2% amitriptyline 2% topical cream Apply topically 3 (three) times daily.    hydrOXYchloroQUINE (PLAQUENIL) 200 mg tablet Take 1 tablet (200 mg total) by mouth 2 (two) times daily.    LIDOcaine (LIDODERM) 5 % Place 1 patch onto the skin once daily. Remove & Discard patch within 12 hours as needed for pain or as directed by MD Place patch onto lower back as needed    magnesium oxide (MAG-OX) 400 mg (241.3 mg magnesium) tablet Take 400 mg by mouth once daily.    mycophenolate (CELLCEPT) 500 mg Tab Take 1 tablet (500 mg total) by mouth 2 (two) times daily.    pantoprazole (PROTONIX) 40 MG  tablet Take 1 tablet (40 mg total) by mouth once daily.    QUEtiapine (SEROQUEL) 25 MG Tab Take 1 tablet (25 mg total) by mouth nightly as needed (insomnia). (Patient not taking: Reported on 8/31/2023)    sulfamethoxazole-trimethoprim 800-160mg (BACTRIM DS) 800-160 mg Tab One tablet by mouth every Monday, Wednesday, Friday to prevent lung infection    thiamine 100 MG tablet Take 100 mg by mouth once daily.    [DISCONTINUED] calcium carbonate (TUMS) 200 mg calcium (500 mg) chewable tablet Take 2 tablets (1,000 mg total) by mouth once daily. (Patient not taking: No sig reported)    [DISCONTINUED] famotidine (PEPCID) 20 MG tablet Take 1 tablet (20 mg total) by mouth 2 (two) times daily. (Patient not taking: Reported on 6/15/2022)    [DISCONTINUED] lacosamide (VIMPAT) 10 mg/mL Soln oral solution Take 10 mLs (100 mg total) by mouth every 12 (twelve) hours. (Patient not taking: No sig reported)     Family History       Problem Relation (Age of Onset)    Breast cancer Maternal Grandmother    Cancer Father    Heart disease Mother    Hypertension Mother    Hyperthyroidism Mother, Other    Prostate cancer Father          Tobacco Use    Smoking status: Never     Passive exposure: Never    Smokeless tobacco: Never   Substance and Sexual Activity    Alcohol use: Yes     Alcohol/week: 0.0 standard drinks of alcohol     Comment: very seldom     Drug use: No    Sexual activity: Not Currently     Comment: ,  age 52,      Review of Systems  Objective:     Vital Signs (Most Recent):  Temp: 98.6 °F (37 °C) (10/09/23 1325)  Pulse: 91 (10/09/23 1622)  Resp: 16 (10/09/23 1622)  BP: (!) 141/77 (10/09/23 1622)  SpO2: 97 % (10/09/23 1622) Vital Signs (24h Range):  Temp:  [98.6 °F (37 °C)] 98.6 °F (37 °C)  Pulse:  [89-92] 91  Resp:  [16-18] 16  SpO2:  [95 %-99 %] 97 %  BP: ()/(40-92) 141/77     Weight: 68 kg (150 lb)  Body mass index is 25.75 kg/m².     Physical Exam  Vitals reviewed.             General: well nourished,  nontoxic appearing  Skin: Warm and dry  Eyes: No conjunctivitis, no scleral icterus  ENT: No nasal bleeding, no obvious discharge  Cardiovascular: Regular rate and rhythm.   Pulmonary/Chest: No accessory muscle use. Clear to auscultation bilaterally  Gastrointestinal: Abdomen soft, NT, ND.  Extremities: no clubbing, cyanosis or edema  Musculoskeletal: appropriate muscle bulk; 5/5 proximal right upper and lower extremity strength including fist  and hip flexor strength; left upper extremity with chronic flaccid paralysis; at least 2/5 left hip flexor strength noted  Neurological: no facial droop, no slurred speech  Psychiatric: word salad but pleasant; eyes closed by default (baseline); pt oriented to self but not to place nor time.       Assessment/Plan:     * Generalized weakness  Similar presentation to last time;  Associated w/ worsening confusion and decreased po intake  Elevated proc likely falsely elevated given CKD; no strong indication for abx at this point; pt doesn't appear ill but rather fatigued  - possible adrenal crisis vs KERMIT; obtain UA to r/o UTI; - Diagnosis of exclusion would be worsening dementia/delirium  - recent stablevit B 12;   - fall precautions; pt/ot  -  stress dose steroids- solucortef tonight and switch over to 40 mg high dose prednisone in AM      Acute kidney injury superimposed on chronic kidney disease  Chronic medical disease noted on renal ultrasound on last admission   Baseline seems to be under 2, currently creatinine at 2.4   Limited IV fluid infusion through the night into a.m., trend renal function  Hold off on home bactrim until renal function improves    Rheumatoid arthritis of multiple sites  Takes mycophenolate and hydroxychloroquine at home for ILD and connective tissue manifestatons of RA  Stress dose steroids (eventually get down to home prednisone 10 mg)  Home plaquenil and CellCept unless infection is confirmed  Hold off on home prophylactic Bactrim until renal  function improves    Anemia of renal disease  stable      Vascular Dementia  Seroquel p.r.n.  Can consider Depakote if needed      AF (paroxysmal atrial fibrillation)  Stable rate   Continue home Eliquis    F/u on blood cultures      VTE Risk Mitigation (From admission, onward)           Ordered     apixaban tablet 2.5 mg  2 times daily         10/09/23 1651     Reason for No Pharmacological VTE Prophylaxis  Once        Question:  Reasons:  Answer:  Already adequately anticoagulated on oral Anticoagulants    10/09/23 1648     IP VTE HIGH RISK PATIENT  Once         10/09/23 1648     Place sequential compression device  Until discontinued         10/09/23 1648                               Prashanth Pena MD  Department of Hospital Medicine  [facility] - Neurosurgery ([facility])

## 2023-10-09 NOTE — ED NOTES
LOC: The patient is lethargic but arousable with touch and voice.  APPEARANCE: Patient resting comfortably and in no acute distress.  SKIN: The skin is warm and dry, color consistent with ethnicity  MUSCULOSKELETAL: no obvious deformities noted.  RESPIRATORY: Airway is open and patent, respirations are spontaneous, patient has a normal effort and rate  ABDOMEN: Soft and non tender to palpation, no distention noted    Patient arrives via ems from [facility] with the complaint of altered mental status. Patient's caretaker at bedside states that since Friday she has had confusion and not as alert as she normally is. Caretaker states that facility ran a urine culture and she was started on Cipro for a UTI. Patient awakes to voice and touch but is lethargic, patient was also noted to have an abnormal sodium level but it is unclear of the exact number. Patient placed in a hospital gown, placed on continuous cardiac monitoring, no acute distress noted, will continue to monitor.

## 2023-10-09 NOTE — SUBJECTIVE & OBJECTIVE
Past Medical History:   Diagnosis Date    Acute cystitis without hematuria 2/27/2020    Acute hypoxemic respiratory failure 4/11/2018    Acute respiratory failure with hypoxia 3/2/2020    KERMIT (acute kidney injury) 3/13/2019    Altered mental status     Anemia     Arthritis     Bilateral hand pain 3/14/2018    Branch retinal vein occlusion, left eye 2/20/2015    Chronic bilateral low back pain without sciatica 3/23/2017    Chronic renal failure in pediatric patient, stage 3 (moderate) 4/15/2018    Chronic renal failure syndrome, stage 3 (moderate) 4/15/2018    Chronic systolic (congestive) heart failure 8/6/2021    Last Assessment & Plan:  Formatting of this note might be different from the original. 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 Hospitalization late 2021, w/ rehab to follow (no notes available) --12/13/2021-CT head revealed thin extra-axial hyperdense collection overlying the right cerebral convexity compatible with acute subdural hematoma, neurosurgery was consulted, patient was noted with Keppra 500 mg b.i.d., apixaban was held -12/19/    Iron deficiency anemia secondary to inadequate dietary iron intake 6/24/2013    Joint pain      Keratoconjunctivitis sicca of both eyes not specified as Sjogren's 7/29/2016    Leiomyoma of uterus, unspecified 9/16/2014    Long QT interval 6/29/2016    Due to medication (plaquenil)     Macular edema 1/12/2015    Multinodular goiter 1/12/2016    Neuropathy 8/23/2017    Plaquenil causing adverse effect in therapeutic use 10/7/2016    Pneumococcal vaccine refused 8/17/2016    Pneumonia due to Streptococcus pneumoniae 4/5/2018    Poor nutrition 12/23/2018    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/2015    RA (rheumatoid arthritis) 12/13/2021    -managed by rhematology, since this is a duplicate problem list entry, will archive this     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       Past Surgical History:   Procedure Laterality Date    BREAST CYST EXCISION      CATARACT EXTRACTION      COLONOSCOPY N/A 9/29/2015    Procedure: COLONOSCOPY;  Surgeon: FIDELINA Sanchez MD;  Location: [facility] ENDO ([address] [place] [address]);  Service: Endoscopy;  Laterality: N/A;    EYE SURGERY      INJECTION OF ANESTHETIC AGENT AROUND NERVE Left 4/19/2021    Procedure: BLOCK, NERVE, FEMORAL AND OBTURATOR;  Surgeon: Shivam Gonzalez MD;  Location: [facility] PAIN MGT;  Service: Pain Management;  Laterality: Left;  consent needed    JOINT REPLACEMENT      right knee    KNEE SURGERY Left 12/31/2013    TKR    left parotidectomy      mixed tumor    HI EVAL,SWALLOW FUNCTION,CINE/VIDEO RECORD  6/5/2021         SALIVARY GLAND SURGERY      TONSILLECTOMY      UPPER GASTROINTESTINAL ENDOSCOPY         Review of patient's allergies indicates:  No Known Allergies    No current facility-administered medications on file  prior to encounter.     Current Outpatient Medications on File Prior to Encounter   Medication Sig    albuterol-ipratropium (DUO-NEB) 2.5 mg-0.5 mg/3 mL nebulizer solution Take 3 mLs by nebulization 2 (two) times daily as needed for Shortness of Breath. Rescue    apixaban (ELIQUIS) 2.5 mg Tab Take 1 tablet (2.5 mg total) by mouth 2 (two) times daily.    atorvastatin (LIPITOR) 40 MG tablet Take 1 tablet (40 mg total) by mouth every evening.    baclofen (LIORESAL) 10 MG tablet Take 0.5 tablets (5 mg total) by mouth with lunch AND 0.5 tablets (5 mg total) daily with dinner or evening meal AND 1 tablet (10 mg total) every evening.    cholecalciferol, vitamin D3, 125 mcg (5,000 unit) capsule Take 1 capsule (5,000 Units total) by mouth once daily.    ferrous sulfate, dried (SLOW FE) 160 mg (50 mg iron) TbSR Take 1 tablet (160 mg total) by mouth every other day.    fluticasone-salmeterol diskus inhaler 100-50 mcg Inhale 1 puff into the lungs 2 (two) times daily. Controller    gabapentin (NEURONTIN) 100 MG capsule Take 1 capsule (100 mg total) by mouth 2 (two) times daily.    gabapentin (NEURONTIN) 300 MG capsule Take 1 capsule (300 mg total) by mouth every evening.    gabapentin 5% baclofen 2% amitriptyline 2% topical cream Apply topically 3 (three) times daily.    hydrOXYchloroQUINE (PLAQUENIL) 200 mg tablet Take 1 tablet (200 mg total) by mouth 2 (two) times daily.    LIDOcaine (LIDODERM) 5 % Place 1 patch onto the skin once daily. Remove & Discard patch within 12 hours as needed for pain or as directed by MD Place patch onto lower back as needed    magnesium oxide (MAG-OX) 400 mg (241.3 mg magnesium) tablet Take 400 mg by mouth once daily.    mycophenolate (CELLCEPT) 500 mg Tab Take 1 tablet (500 mg total) by mouth 2 (two) times daily.    pantoprazole (PROTONIX) 40 MG tablet Take 1 tablet (40 mg total) by mouth once daily.    QUEtiapine (SEROQUEL) 25 MG Tab Take 1 tablet (25 mg total) by mouth nightly as needed  (insomnia). (Patient not taking: Reported on 8/31/2023)    sulfamethoxazole-trimethoprim 800-160mg (BACTRIM DS) 800-160 mg Tab One tablet by mouth every Monday, Wednesday, Friday to prevent lung infection    thiamine 100 MG tablet Take 100 mg by mouth once daily.    [DISCONTINUED] calcium carbonate (TUMS) 200 mg calcium (500 mg) chewable tablet Take 2 tablets (1,000 mg total) by mouth once daily. (Patient not taking: No sig reported)    [DISCONTINUED] famotidine (PEPCID) 20 MG tablet Take 1 tablet (20 mg total) by mouth 2 (two) times daily. (Patient not taking: Reported on 6/15/2022)    [DISCONTINUED] lacosamide (VIMPAT) 10 mg/mL Soln oral solution Take 10 mLs (100 mg total) by mouth every 12 (twelve) hours. (Patient not taking: No sig reported)     Family History       Problem Relation (Age of Onset)    Breast cancer Maternal Grandmother    Cancer Father    Heart disease Mother    Hypertension Mother    Hyperthyroidism Mother, Other    Prostate cancer Father          Tobacco Use    Smoking status: Never     Passive exposure: Never    Smokeless tobacco: Never   Substance and Sexual Activity    Alcohol use: Yes     Alcohol/week: 0.0 standard drinks of alcohol     Comment: very seldom     Drug use: No    Sexual activity: Not Currently     Comment: ,  age 52,      Review of Systems  Objective:     Vital Signs (Most Recent):  Temp: 98.6 °F (37 °C) (10/09/23 1325)  Pulse: 91 (10/09/23 1622)  Resp: 16 (10/09/23 1622)  BP: (!) 141/77 (10/09/23 1622)  SpO2: 97 % (10/09/23 1622) Vital Signs (24h Range):  Temp:  [98.6 °F (37 °C)] 98.6 °F (37 °C)  Pulse:  [89-92] 91  Resp:  [16-18] 16  SpO2:  [95 %-99 %] 97 %  BP: ()/(40-92) 141/77     Weight: 68 kg (150 lb)  Body mass index is 25.75 kg/m².     Physical Exam  Vitals reviewed.             General: well nourished, nontoxic appearing  Skin: Warm and dry  Eyes: No conjunctivitis, no scleral icterus  ENT: No nasal bleeding, no obvious discharge  Cardiovascular:  Regular rate and rhythm.   Pulmonary/Chest: No accessory muscle use. Clear to auscultation bilaterally  Gastrointestinal: Abdomen soft, NT, ND.  Extremities: no clubbing, cyanosis or edema  Musculoskeletal: appropriate muscle bulk; 5/5 proximal right upper and lower extremity strength including fist  and hip flexor strength; left upper extremity with chronic flaccid paralysis; at least 2/5 left hip flexor strength noted  Neurological: no facial droop, no slurred speech  Psychiatric: word salad but pleasant; eyes closed by default (baseline); pt oriented to self but not to place nor time.

## 2023-10-09 NOTE — ED NOTES
"Educated patient on the need for a urine sample and that the provider would like to straight cath for sample since she has not been able to provide one on the purwik, patient refuses and states "No way, get out of here." Made the provider aware, will continue to educate and monitor patient.  "

## 2023-10-09 NOTE — ED PROVIDER NOTES
Source of History:  Chart, caregiver    Chief complaint:  Altered Mental Status (Pt arrives from [facility] for evaluation of AMS x 1 week. Pt is being treated for a UTI. Pt is oriented to person and time. )      HPI:  Selma Lux is a 88 y.o. female with medical history of COPD exacerbation, dementia, chronic UTIs, paroxysmal AFib, COPD, adrenocortical insufficiency, stroke presenting with altered mental status for the past week.  Patient resides at [facility] had [facility] home and as per caregiver she is been being treated for a possible urinary tract infection.  Patient was found to be hyponatremic at site and sent to the ED for evaluation.  Caregiver states patient has started with altered mental status on Thursday and slept most of the day on Friday.  Caregiver was off this weekend but when she returned today patient was still altered.    This is the extent to the patients complaints today here in the emergency department.    ROS: As per HPI and below:  (As per chart and caregiver, limited due to dementia)  Constitutional: No fever.  No chills.    Eyes: No visual changes.  ENT: No sore throat. No ear pain    Cardiovascular: No chest pain.  Respiratory: No shortness of breath.  GI: No abdominal pain.  No nausea or vomiting.  Genitourinary: No abnormal urination.  Neurologic:  Positive for altered mental status.  Positive for lethargy.  MSK: no back pain.  Integument: No rashes or lesions.  Hematologic: No easy bruising.  Endocrine: No excessive thirst or urination.    Review of patient's allergies indicates:  No Known Allergies    PMH:  As per HPI and below:  Past Medical History:   Diagnosis Date    Acute cystitis without hematuria 2/27/2020    Acute hypoxemic respiratory failure 4/11/2018    Acute respiratory failure with hypoxia 3/2/2020    KERMIT (acute kidney injury) 3/13/2019    Altered mental status     Anemia     Arthritis     Bilateral hand pain 3/14/2018    Branch retinal vein occlusion,  left eye 2/20/2015    Chronic bilateral low back pain without sciatica 3/23/2017    Chronic renal failure in pediatric patient, stage 3 (moderate) 4/15/2018    Chronic renal failure syndrome, stage 3 (moderate) 4/15/2018    Chronic systolic (congestive) heart failure 8/6/2021    Last Assessment & Plan:  Formatting of this note might be different from the original. 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 Hospitalization late 2021, w/ rehab to follow (no notes available) --12/13/2021-CT head revealed thin extra-axial hyperdense collection overlying the right cerebral convexity compatible with acute subdural hematoma, neurosurgery was consulted, patient was noted with Keppra 500 mg b.i.d., apixaban was held -12/19/    Iron deficiency anemia secondary to inadequate dietary iron intake 6/24/2013    Joint pain     Keratoconjunctivitis sicca of both eyes not specified as Sjogren's 7/29/2016    Leiomyoma of uterus, unspecified 9/16/2014    Long QT interval 6/29/2016    Due to medication (plaquenil)     Macular edema 1/12/2015    Multinodular goiter 1/12/2016    Neuropathy 8/23/2017    Plaquenil causing adverse effect in therapeutic use 10/7/2016    Pneumococcal vaccine  refused 8/17/2016    Pneumonia due to Streptococcus pneumoniae 4/5/2018    Poor nutrition 12/23/2018    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/2015    RA (rheumatoid arthritis) 12/13/2021    -managed by rhematology, since this is a duplicate problem list entry, will archive this     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     Past Surgical History:   Procedure Laterality Date    BREAST CYST EXCISION      CATARACT EXTRACTION      COLONOSCOPY N/A 9/29/2015    Procedure: COLONOSCOPY;  Surgeon: FIDELINA Sanchez MD;  [facility]  Service: Endoscopy;  Laterality: N/A;    EYE SURGERY      INJECTION OF ANESTHETIC AGENT AROUND NERVE Left 4/19/2021    Procedure: BLOCK, NERVE, FEMORAL AND OBTURATOR;  Surgeon: Shivam Gonzalez MD;  Location: [facility] PAIN MGT;  Service: Pain Management;  Laterality: Left;  consent needed    JOINT REPLACEMENT      right knee    KNEE SURGERY Left 12/31/2013    TKR    left parotidectomy      mixed tumor    [place] EVAL,SWALLOW FUNCTION,CINE/VIDEO RECORD  6/5/2021         SALIVARY GLAND SURGERY      TONSILLECTOMY      UPPER GASTROINTESTINAL ENDOSCOPY         Social History     Tobacco Use    Smoking status: Never     Passive exposure: Never    Smokeless tobacco: Never   Substance Use Topics    Alcohol use: Yes     Alcohol/week: 0.0 standard drinks of alcohol     Comment: very seldom     Drug use: No       Physical Exam:    BP (!) 141/77   Pulse 91   Temp 98.6 °F (37 °C) (Rectal)   Resp 16   Wt 68 kg (150 lb)   LMP  (LMP Unknown)   SpO2 97%   BMI 25.75 kg/m²   Nursing note and vital signs reviewed.  Constitutional: No acute  distress.  Nontoxic  Eyes: No conjunctival injection.  Extraocular muscles are intact.  ENT: Oropharynx clear.    Cardiovascular: Regular rate and rhythm.  No murmurs. No gallops. No rubs.  Respiratory: Clear to auscultation bilaterally.  Good air movement.  No wheezes.  No rhonchi. No rales. No accessory muscle use.  Abdomen: Soft.  Not distended.  Nontender.  No guarding.  No rebound. Non-peritoneal.  Musculoskeletal: Good range of motion all joints.  No deformities.  Neck supple.  No meningismus.  Skin: No rashes seen.  Good turgor.  No abrasions.  No ecchymoses.  Neurologic:  Cranial nerves 2-12 are intact.  All extremities are 5/5 strength.  Motor and sensory intact.  No ataxia.  Negative Romberg test.  No cerebellar findings.  Good finger-to-nose.  No focal neurological deficits.  Psych:  Appropriate, conversant    MDM    Emergent evaluation of an obese 86 yo female presenting for altered mental status and lethargy since Thursday.  As per nursing home staff they were concerned for UTI and started on ciprofloxacin.  On exam pt is A&O.  Falls asleep during exam.  Answers questions appropriately.  Nonfebrile and nontoxic appearing.  Mucous membranes pink but dry.  Breath sounds clear bilaterally.  No rhonchi, rales or wheezing noted on exam Abdomen soft and nontender. No rebound or guarding appreciated on exam.   BS WNL.  Pt speaking in full sentences. Cap refill > 3 seconds.      History Acquisition   Additional history was acquired from other historians.  Caregiver, chart    The patient's list of active medical problems, social history, medications, and allergies as documented per RN staff has been reviewed.     Differential Diagnoses   Based on available information and the initial assessment, the working differential diagnoses considered during this evaluation include but are not limited to stroke, UTI, dehydration, electrolyte abnormality, encephalopathy, adrenal insufficiency, sepsis, others.    I will get  labs, imaging, hydrate and reassess.  I discussed this case with my supervising physician.      LABS     Labs Reviewed   CBC W/ AUTO DIFFERENTIAL - Abnormal; Notable for the following components:       Result Value    RBC 3.31 (*)     Hemoglobin 8.9 (*)     Hematocrit 29.4 (*)     MCH 26.9 (*)     MCHC 30.3 (*)     RDW 18.0 (*)     Platelets 122 (*)     Immature Granulocytes 1.2 (*)     Immature Grans (Abs) 0.10 (*)     Mono # 3.1 (*)     Gran % 36.5 (*)     Mono % 38.7 (*)     All other components within normal limits   COMPREHENSIVE METABOLIC PANEL - Abnormal; Notable for the following components:    Sodium 131 (*)     CO2 21 (*)     BUN 28 (*)     Creatinine 2.4 (*)     Calcium 8.1 (*)     Total Protein 5.2 (*)     Albumin 2.6 (*)     ALT 8 (*)     eGFR 19.2 (*)     All other components within normal limits   PROCALCITONIN - Abnormal; Notable for the following components:    Procalcitonin 0.43 (*)     All other components within normal limits   ISTAT PROCEDURE - Abnormal; Notable for the following components:    POC PO2 30 (*)     POC HCO3 23.6 (*)     POC Sodium 132 (*)     POC Hematocrit 26 (*)     All other components within normal limits   CULTURE, BLOOD   CULTURE, BLOOD   MAGNESIUM   URINALYSIS, REFLEX TO URINE CULTURE   ISTAT LACTATE         Imaging     Imaging Results              X-Ray Chest AP Portable (Final result)  Result time 10/09/23 16:08:28      Final result by Ben Christianson MD (10/09/23 16:08:28)                   Impression:      1. Pulmonary findings suggest edema or other nonspecific pneumonitis noting shallow inspiratory effort.  There is stable configuration of the mediastinum.      Electronically signed by: Ben Christianson MD  Date:    10/09/2023  Time:    16:08               Narrative:    EXAMINATION:  XR CHEST AP PORTABLE    CLINICAL HISTORY:  Sepsis;    TECHNIQUE:  Single frontal view of the chest was performed.    COMPARISON:  09/03/2023    FINDINGS:  The cardiomediastinal  silhouette is prominent, particularly solving the upper mediastinum on the right, stable configuration..  There is no pleural effusion.  The trachea is deviated leftward by right mediastinal process, stable.  There is elevation of the right hemidiaphragm..  The lungs are symmetrically expanded bilaterally with patchy increased interstitial and parenchymal attenuation bilaterally, primarily in a perihilar distribution suggesting edema or other nonspecific pneumonitis..  No large focal consolidation seen.  There is no pneumothorax.  The osseous structures are remarkable for degenerative change..                                       CT Head Without Contrast (Final result)  Result time 10/09/23 14:49:38      Final result by Ubaldo Driver MD (10/09/23 14:49:38)                   Impression:      1. No evidence of acute intracranial pathology.  If there is concern for acute ischemic etiology, consider MRI brain for further evaluation.  2. Chronic microvascular ischemic change and multifocal remote infarcts.    Electronically signed by resident: David Enciso  Date:    10/09/2023  Time:    14:26    Electronically signed by: Ubaldo Driver  Date:    10/09/2023  Time:    14:49               Narrative:    EXAMINATION:  CT HEAD WITHOUT CONTRAST    CLINICAL HISTORY:  Mental status change, unknown cause;    TECHNIQUE:  Low dose axial CT images obtained throughout the head without the use of intravenous contrast.  Axial, sagittal and coronal reconstructions were performed.    COMPARISON:  MRI brain 08/31/2023    CT head 08/31/2023    FINDINGS:  Exam degraded by patient motion artifact.    Generalized cerebral volume loss.  Ventricles stable in size without evidence of hydrocephalus.    The brain parenchyma appears unchanged.  Patchy and confluent hypoattenuation in the supratentorial white matter, nonspecific but likely sequela of chronic microvascular ischemic change.  Remote infarcts in the right centrum semiovale, right tatyana, and  bilateral cerebellar hemispheres.  No new parenchymal mass, hemorrhage, edema or major vascular distribution infarct.    Stable meningocele extending through the left sphenoid wing.  Diffuse dural calcification, unchanged.    No extra-axial blood or fluid collections.    No acute displaced calvarial fracture.  Mastoid air cells and paranasal sinuses are essentially clear.    Bilateral pseudophakia.                                      A radiology report was available for my review at the time of the encounter.    EKG        Additional Consideration   All available testing was considered during the course of this workup.  Comorbidities taken into consideration during the patient's evaluation and treatment include weight, age.    Social determinants of health were taken into consideration during development of our treatment plan.    Medications   sodium chloride 0.9% flush 10 mL (has no administration in time range)   naloxone 0.4 mg/mL injection 0.02 mg (has no administration in time range)   glucose chewable tablet 16 g (has no administration in time range)   glucose chewable tablet 24 g (has no administration in time range)   glucagon (human recombinant) injection 1 mg (has no administration in time range)   albuterol sulfate nebulizer solution 2.5 mg (has no administration in time range)     And   ipratropium 0.02 % nebulizer solution 0.5 mg (has no administration in time range)   hydroxychloroquine tablet 200 mg (has no administration in time range)   mycophenolate capsule 500 mg (has no administration in time range)   quetiapine split tablet 12.5 mg (has no administration in time range)   pantoprazole EC tablet 40 mg (has no administration in time range)   thiamine tablet 100 mg (has no administration in time range)   magnesium oxide tablet 400 mg (has no administration in time range)   fluticasone furoate-vilanteroL 100-25 mcg/dose diskus inhaler 1 puff (has no administration in time range)   atorvastatin  tablet 40 mg (has no administration in time range)   apixaban tablet 2.5 mg (has no administration in time range)   predniSONE tablet 40 mg (has no administration in time range)   hydrocortisone sodium succinate injection 100 mg (has no administration in time range)   0.9%  NaCl infusion ( Intravenous Rate/Dose Change 10/9/23 1930)   sulfamethoxazole-trimethoprim 800-160mg per tablet 1 tablet (has no administration in time range)   lactated ringers bolus 1,000 mL (0 mLs Intravenous Stopped 10/9/23 1510)   0.9%  NaCl infusion (1,000 mLs Intravenous New Bag 10/9/23 1512)      ED Course as of 10/09/23 2012   Mon Oct 09, 2023   1420 CBC shows no leukocytosis.  H&H low but stable at 8.9 and 29.4.  CMP shows mild hyponatremia at 1:32 a.m..  BUN elevated 28 with a creatinine of 2.4.  Lactate negative at 0.84.  Mag within normal limits.  Pro count slightly elevated 0.43.  Patient is getting p.o. antibiotics at nursing home. [RZ]   1501 Will hold off on antibiotics at this time due to no leukocytosis and negative lactic.  Will discussed case with hospital medicine to admit for altered mental status and KERMIT.  Awaiting consult. [RZ]   1517 Discussed case with hospital medicine.  Will admit for further evaluation treatment of altered mental status.  Awaiting bed assignment. [RZ]   1530 CT Head Without Contrast [RZ]   1606 EKG, independently interpreted, Sinus rhythm at a rate of 93 with PVCs, nonspecific ST abnormalities, QT mildly prolonged, normal axis. [BA]      ED Course User Index  [BA] Zechariah Flaherty MD  [RZ] Meron Tanner NP             CLINICAL IMPRESSION  1. Altered mental status, unspecified altered mental status type    2. Sepsis    3. KERMIT (acute kidney injury)         ED Disposition Condition    Admit Stable          This note was created using dictation software.  This program may occasionally mistype words and phrases.       Meron Tanner NP  10/09/23 2012

## 2023-10-10 PROBLEM — A41.9 SEPSIS: Status: ACTIVE | Noted: 2023-01-01

## 2023-10-10 NOTE — PLAN OF CARE
OT evaluation completed, POC established as appropriate. OT recommending return to SNF pending participation and medical prognosis once medically stable for discharge.    Problem: Occupational Therapy  Goal: Occupational Therapy Goal  Description: Goals set on 10/10, with expiration date 11/9:  Patient will increase functional independence with ADLs by performing:    Bed mobility with Mod A  Grooming while seated EOB with Mod A  UB Dressing with Mod A.  LB Dressing with Mod A.  Toileting from bedside commode with Mod A for hygiene and clothing management.   Stand pivot transfers with Max A using AD as needed  Pt will demonstrate understanding of education provided regarding energy conservation and task modification through teach-back method.  Pt will demonstrate Knott in HEP for BUE strengthening GM/FM exercises to improve functional performance.       Outcome: Ongoing, Progressing

## 2023-10-10 NOTE — PT/OT/SLP EVAL
Speech Language Pathology Evaluation  Bedside Swallow    Patient Name:  Selma Lux   MRN:  [id number]  Admitting Diagnosis: UTI (urinary tract infection)    Recommendations:                 General Recommendations:  Dysphagia therapy  Diet recommendations:  Regular Diet - IDDSI Level 7, Thin liquids - IDDSI Level 0   Aspiration Precautions: 1 bite/sip at a time, Assistance with meals, Avoid talking while eating, Eliminate distractions, Feed only when awake/alert, Small bites/sips, and Standard aspiration precautions   General Precautions: Standard, aspiration, fall  Communication strategies:  provide increased time to answer    Assessment:     Selma Lux is a 88 y.o. female with an SLP diagnosis of Dysphagia.  She presents with confusion and limited po intake.    History:     Past Medical History:   Diagnosis Date    Acute cystitis without hematuria 2/27/2020    Acute hypoxemic respiratory failure 4/11/2018    Acute respiratory failure with hypoxia 3/2/2020    KERMIT (acute kidney injury) 3/13/2019    Altered mental status     Anemia     Arthritis     Bilateral hand pain 3/14/2018    Branch retinal vein occlusion, left eye 2/20/2015    Chronic bilateral low back pain without sciatica 3/23/2017    Chronic renal failure in pediatric patient, stage 3 (moderate) 4/15/2018    Chronic renal failure syndrome, stage 3 (moderate) 4/15/2018    Chronic systolic (congestive) heart failure 8/6/2021    Last Assessment & Plan:  Formatting of this note might be different from the original. 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 Hospitalization late 2021, w/ rehab to follow (no notes available) --12/13/2021-CT head revealed thin extra-axial hyperdense collection overlying the right cerebral convexity compatible with acute subdural hematoma, neurosurgery was consulted, patient was noted with Keppra 500 mg b.i.d., apixaban was held -12/19/    Iron deficiency anemia secondary to inadequate dietary iron intake 6/24/2013    Joint pain     Keratoconjunctivitis sicca of both eyes not specified as Sjogren's 7/29/2016    Leiomyoma of uterus, unspecified 9/16/2014    Long QT interval 6/29/2016    Due to medication (plaquenil)     Macular edema 1/12/2015    Multinodular goiter 1/12/2016    Neuropathy 8/23/2017    Plaquenil causing adverse effect in therapeutic use 10/7/2016    Pneumococcal vaccine refused 8/17/2016    Pneumonia due to Streptococcus pneumoniae 4/5/2018    Poor nutrition 12/23/2018    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/2015    RA (rheumatoid arthritis) 12/13/2021    -managed by rhematology, since this is a duplicate problem list entry, will archive this     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       Past Surgical History:   Procedure Laterality Date    BREAST CYST EXCISION      CATARACT EXTRACTION       "COLONOSCOPY N/A 9/29/2015    Procedure: COLONOSCOPY;  Surgeon: FIDELINA Sanchez MD;  Location: [facility] ENDO (4TH FLR);  Service: Endoscopy;  Laterality: N/A;    EYE SURGERY      INJECTION OF ANESTHETIC AGENT AROUND NERVE Left 4/19/2021    Procedure: BLOCK, NERVE, FEMORAL AND OBTURATOR;  Surgeon: Shivam Gonzalez MD;  Location: [facility] PAIN MGT;  Service: Pain Management;  Laterality: Left;  consent needed    JOINT REPLACEMENT      right knee    KNEE SURGERY Left 12/31/2013    TKR    left parotidectomy      mixed tumor    VA EVAL,SWALLOW FUNCTION,CINE/VIDEO RECORD  6/5/2021         SALIVARY GLAND SURGERY      TONSILLECTOMY      UPPER GASTROINTESTINAL ENDOSCOPY         Social History: Patient transferred from [facility]    Prior Intubation HX:  none    Modified Barium Swallow: none    Prior diet: per chart, regular and thin.      Subjective     " Feel alright"  Patient goals: unable to state     Pain/Comfort:  Pain Rating 1: 0/10  Pain Rating Post-Intervention 1: 0/10    Respiratory Status: Room air    Objective:     Oral Musculature Evaluation  Oral Musculature: unable to assess due to poor participation/comprehension  Dentition: present and adequate  Voice Prior to PO Intake: clear    Bedside Swallow Eval:   Consistencies Assessed:  Thin liquids sips from a straw x5  Puree teaspoon x2  Solids 1/4 cracker      Oral Phase:   WFL    Pharyngeal Phase:   no overt clinical signs/symptoms of pharyngeal dysphagia    Compensatory Strategies  None    Treatment: Nursing cleared pt for session. No family present. Pt easily roused but with confusion noted throughout. She was oriented to person and month only. She occasionally responded appropriately to a question but mostly unable to answer simple questions. Pt is known to this service from previous admissions and was most recently discharged on a regular diet and thin liquids. With encouragement, pt drank sprite from a straw. She took two bites of pudding and one bite of cracker. Pt did not " wish for any further trials. No overt s/s of aspiration. Pt safe for a diet but unsure if she will obtain enough calories via po intake at this time due to her confusion. Would consider supervision with all meals to encourage intake and may want to add a supplemental shake to increase caloric intake. Education provided to pt but she will need ongoing education.     Goals:   Multidisciplinary Problems       SLP Goals          Problem: SLP    Goal Priority Disciplines Outcome   SLP Goal     SLP    Description: Goals to be met 10/17  1 pt will tolerate regular diet and thin liquids.                        Plan:     Patient to be seen:  3 x/week   Plan of Care expires:     Plan of Care reviewed with:  patient   SLP Follow-Up:  Yes       Discharge recommendations:  other (see comments)       Time Tracking:     SLP Treatment Date:   10/10/23  Speech Start Time:  0758  Speech Stop Time:  0814     Speech Total Time (min):  16 min    Billable Minutes: Eval Swallow and Oral Function 8 and Self Care/Home Management Training 8    10/10/2023

## 2023-10-10 NOTE — PROGRESS NOTES
Pharmacokinetic Initial Assessment: IV Vancomycin    Assessment/Plan:    Patient's serum creatinine appears to be around baseline but will plan to pulse dose as it appears the patient has poor renal function at baseline  Plan to give a single 20 mg/kg (1.5 g) IV vancomycin dose today and follow up with a random level scheduled w/ 10/11 AM labs  Would recommend to de-escalate based on urine cultures once results are available  Desired empiric serum trough concentration is 10 to 20 mcg/mL  Pharmacy will continue to follow and monitor vancomycin.      Please contact pharmacy at extension [phone] with any questions regarding this assessment.     Thank you for the consult,   Wicho Nelson, PharmD, [facility]      Patient brief summary:  Selma Lux is a 88 y.o. female initiated on antimicrobial therapy with IV Vancomycin for treatment of suspected urinary tract infection    Drug Allergies:   Review of patient's allergies indicates:  No Known Allergies    Actual Body Weight:   72 kg    Renal Function:   Estimated Creatinine Clearance: 24.5 mL/min (A) (based on SCr of 1.6 mg/dL (H)).,     Dialysis Method (if applicable):  N/A    CBC (last 72 hours):  Recent Labs   Lab Result Units 10/09/23  1317 10/10/23  0530   WBC K/uL 8.07 2.95*   Hemoglobin g/dL 8.9* 9.1*   Hematocrit % 29.4* 29.4*   Platelets K/uL 122* 127*   Gran % % 36.5* 66.5   Lymph % % 22.8 22.7   Mono % % 38.7* 8.5   Eosinophil % % 0.4 0.0   Basophil % % 0.4 0.3   Differential Method  Automated Automated       Metabolic Panel (last 72 hours):  Recent Labs   Lab Result Units 10/09/23  1317 10/10/23  0458 10/10/23  0530   Sodium mmol/L 131*  --  132*   Potassium mmol/L 4.8  --  5.0   Chloride mmol/L 102  --  104   CO2 mmol/L 21*  --  18*   Glucose mg/dL 75  --  88   Glucose, UA   --  Negative  --    BUN mg/dL 28*  --  21   Creatinine mg/dL 2.4*  --  1.6*   Albumin g/dL 2.6*  --   --    Total Bilirubin mg/dL 0.9  --   --    Alkaline Phosphatase U/L 58  --   --   "  AST U/L 14  --   --    ALT U/L 8*  --   --    Magnesium mg/dL 1.9  --   --        Drug levels (last 3 results):  No results for input(s): "VANCOMYCINRA", "VANCORANDOM", "VANCOMYCINPE", "VANCOPEAK", "VANCOMYCINTR", "VANCOTROUGH" in the last 72 hours.    Microbiologic Results:  Microbiology Results (last 7 days)       Procedure Component Value Units Date/Time    Urine culture [[id number]] Collected: 10/10/23 0458    Order Status: No result Specimen: Urine Updated: 10/10/23 0742    Urine culture [[id number]] Collected: 09/09/23 1253    Order Status: No result Specimen: Urine Updated: 10/10/23 0033    Blood culture x two cultures. Draw prior to antibiotics. [[id number]] Collected: 10/09/23 1317    Order Status: Completed Specimen: Blood from Peripheral, Antecubital, Right Updated: 10/09/23 2115     Blood Culture, Routine No Growth to date    Narrative:      Aerobic and anaerobic    Blood culture x two cultures. Draw prior to antibiotics. [[id number]] Collected: 10/09/23 1317    Order Status: Completed Specimen: Blood from Peripheral, Antecubital, Right Updated: 10/09/23 2115     Blood Culture, Routine No Growth to date    Narrative:      Aerobic and anaerobic            "

## 2023-10-10 NOTE — PT/OT/SLP EVAL
Occupational Therapy   Evaluation    Name: Selma Lux  MRN: [id number]  Admitting Diagnosis:  UTI (urinary tract infection)    Length of Stay: 1 days    Recommendations:     Discharge Recommendations: nursing facility, skilled  Discharge Equipment Recommendations:  to be determined by next level of care  Barriers to discharge:  Other (Comment) (Increases skilled assistance required)    Plan:     Patient to be seen 3 x/week to address the above listed problems via self-care/home management, therapeutic activities, therapeutic exercises, cognitive retraining, neuromuscular re-education  Plan of Care Expires: 11/09/23  Plan of Care Reviewed with: patient    Assessment:     Selma Lux is a 88 y.o. female with a medical diagnosis of UTI (urinary tract infection).  She presents with the following performance deficits affecting function: weakness, impaired endurance, impaired self care skills, impaired functional mobility, gait instability, impaired balance, decreased safety awareness, decreased lower extremity function, decreased upper extremity function, decreased coordination, impaired cognition, decreased ROM, impaired coordination.      Pt presenting with impaired cognition, left side weakness, increased fatigue, global deconditioning, impaired balance, impaired endurance and decreased safety awareness upon evaluation this date, requiring increased assistance to complete functional tasks. Patient inconsistently following commands, requiring increased cueing for redirecting and completion of task. Patient tolerated EOB sitting for ~12 minutes while engaged in functional assessment as tolerated. Patient presenting with significant forward flexed posture. Pt would benefit from continued acute skilled OT services at this time to increase functional performance, and improve quality of life. OT to recommend return to SNF pending participation and medical prognosis at discharge once medically appropriate for  "increased functional independence and to improve patient safety before returning home.    Rehab Prognosis: Fair; patient would benefit from acute skilled OT services to address these deficits and reach maximum level of function.       Subjective   Patient states: " I did that already. What did I tell you "    Communicated with: Nurse prior to session.  Patient found HOB elevated with wedge on R side SCD, peripheral IV, PureWick upon OT entry to room.    Chief Complaint: Altered Mental Status (Pt arrives from [facility] for evaluation of AMS x 1 week. Pt is being treated for a UTI. Pt is oriented to person and time. )    Patient/Family Comments/goals: to return home at [facility]    Pain/Comfort:  Pain Rating 1: 0/10  Pain Rating Post-Intervention 1: 0/10    Patients cultural, spiritual, Baptism conflicts given the current situation: no    Occupational Profile:    Patient is a poor historian, per chart review came from [facility]. Prior to SNF admission patient was living with her 2 daughters in [facility] [address]; was completing stand pivot transfers to[place] and wheelchair. Unknown level of functional status since being at SNF facility. Patient will likely require a hospital bed and lift device should discharge plan be home with family.  Patient uses DME as follows: bedside commode, wheelchair, cane, quad, cane, straight, walker, rolling.   DME owned (not currently used): none.  Roles/Repsonsibilities:   Hand Dominance: right   Work: Retired.   Drive: no.   Managing Medicines/Managing Home: no.   Equipment Used at Home:  bedside commode, wheelchair, cane, quad, cane, straight, walker, rolling    Patient reports they will have assistance from unknown upon discharge.      Objective:     Patient found with: SCD, peripheral IV, PureWick   General Precautions: Standard, Cardiac aspiration, fall   Orthopedic Precautions:N/A   Braces: N/A   Oxygen Device: Room Air  Vitals: /64 (BP Location: Left arm, Patient " "Position: Lying)   Pulse 96   Temp 99 °F (37.2 °C) (Oral)   Resp 16   Ht 5' 4.02" (1.626 m)   Wt 72 kg (158 lb 11.7 oz)   LMP  (LMP Unknown)   SpO2 98%   Breastfeeding No   BMI 27.23 kg/m²     Cognitive and Psychosocial Function:   AxOx1 -- Person with choice option  Follows Commands/attention:inattentive and easily distracted by self/internal stimuli  Communication:  clear/fluent  Memory: Impaired STM, Impaired LTM, and Poor immediate recall  Safety awareness/insight to disability: impaired   Mood/Affect/Coping skills/emotional control: Appropriate to situation    Hearing: Intact    Vision:  KERRY    Physical Exam:  Postural examination/scapula alignment:    -       Rounded shoulders  -       Forward head  -       Anterior pelvic tilt  Skin integrity: Visible skin intact     Left UE Right UE   UE Edema absent absent   UE ROM AAROM WFL; increased tone, limited by poor command following AAROM WFL; limited by command following   UE Strength 3/5 3/5    Strength 3+/5 3+/5   Sensation LUE IMPAIRED:patient unable to state RUE IMPAIRED: patient unable to state   Fine Motor Coordination:  LUE IMPAIRED:  RUE IMPAIRED:    Gross Motor Coordination: LUE INTACT: WFL RUE INTACT:WFL     Occupational Performance:  Bed Mobility:    Patient completed Rolling/Turning to Left with  total assistance and 2 persons  Patient completed Supine to Sit with total assistance and 2 persons on L side of bed  Scooting anteriorly to EOB to have both feet planted on floor: total assistance  Patient completed Sit to Supine with total assistance and 2 persons on L side of bed  Scooting to HOB in supine: dependent and 2 persons; draw-sheet pull    Functional Mobility/Transfers:  Static Sitting EOB: SBA-CGA  Dynamic Sitting EOB: CGA-Mod A  Deferred stand trial due to poor EOB sitting balance and tolerance as well as command following for safety      Activities of Daily Living:  Grooming: patient declined to complete  KERRY, demonstrates will " require increased assistance      [facility] 6 Click ADL:  AMPA Total Score: 10    Treatment & Education:  -Pt education on OT role and POC.  -Importance of E/OOB activity with staff assistance, emphasis on daily participation  -Safety during functional transfer and mobility ensured  -Patient provided with education on importance of Bilateral UB/LB integration during functional tasks for improvement in functional performance.   -Education provided/reviewed, questions answered within OT scope of practice.   -Patient will continue to require reinforcement to demonstrate understanding and learning this date.         Patient left HOB elevated with all lines intact, call button in reach, bed alarm on, and nurse notified    GOALS:   Multidisciplinary Problems       Occupational Therapy Goals          Problem: Occupational Therapy    Goal Priority Disciplines Outcome Interventions   Occupational Therapy Goal     OT, PT/OT Ongoing, Progressing    Description: Goals set on 10/10, with expiration date 11/9:  Patient will increase functional independence with ADLs by performing:    Bed mobility with Mod A  Grooming while seated EOB with Mod A  UB Dressing with Mod A.  LB Dressing with Mod A.  Toileting from bedside commode with Mod A for hygiene and clothing management.   Stand pivot transfers with Max A using AD as needed  Pt will demonstrate understanding of education provided regarding energy conservation and task modification through teach-back method.  Pt will demonstrate [place] in HEP for BUE strengthening GM/FM exercises to improve functional performance.                            History:     Past Medical History:   Diagnosis Date    Acute cystitis without hematuria 2/27/2020    Acute hypoxemic respiratory failure 4/11/2018    Acute respiratory failure with hypoxia 3/2/2020    KERMIT (acute kidney injury) 3/13/2019    Altered mental status     Anemia     Arthritis     Bilateral hand pain 3/14/2018    Branch retinal  vein occlusion, left eye 2/20/2015    Chronic bilateral low back pain without sciatica 3/23/2017    Chronic renal failure in pediatric patient, stage 3 (moderate) 4/15/2018    Chronic renal failure syndrome, stage 3 (moderate) 4/15/2018    Chronic systolic (congestive) heart failure 8/6/2021    Last Assessment & Plan:  Formatting of this note might be different from the original. 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 Hospitalization late 2021, w/ rehab to follow (no notes available) --12/13/2021-CT head revealed thin extra-axial hyperdense collection overlying the right cerebral convexity compatible with acute subdural hematoma, neurosurgery was consulted, patient was noted with Keppra 500 mg b.i.d., apixaban was held -12/19/    Iron deficiency anemia secondary to inadequate dietary iron intake 6/24/2013    Joint pain     Keratoconjunctivitis sicca of both eyes not specified as Sjogren's 7/29/2016    Leiomyoma of uterus, unspecified 9/16/2014    Long QT interval 6/29/2016    Due to medication (plaquenil)     Macular edema 1/12/2015    Multinodular goiter 1/12/2016    Neuropathy 8/23/2017    Plaquenil causing adverse effect in therapeutic use 10/7/2016     Pneumococcal vaccine refused 8/17/2016    Pneumonia due to Streptococcus pneumoniae 4/5/2018    Poor nutrition 12/23/2018    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/2015    RA (rheumatoid arthritis) 12/13/2021    -managed by rhematology, since this is a duplicate problem list entry, will archive this     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         Past Surgical History:   Procedure Laterality Date    BREAST CYST EXCISION      CATARACT EXTRACTION      COLONOSCOPY N/A 9/29/2015    Procedure: COLONOSCOPY;  Surgeon: FIDELINA Sanchez MD;  [facility]  Service: Endoscopy;  Laterality: N/A;    EYE SURGERY      INJECTION OF ANESTHETIC AGENT AROUND NERVE Left 4/19/2021    Procedure: BLOCK, NERVE, FEMORAL AND OBTURATOR;  Surgeon: Shivam Gonzalez MD;  Location: [facility] PAIN MGT;  Service: Pain Management;  Laterality: Left;  consent needed    JOINT REPLACEMENT      right knee    KNEE SURGERY Left 12/31/2013    TKR    left parotidectomy      mixed tumor    DE EVAL,SWALLOW FUNCTION,CINE/VIDEO RECORD  6/5/2021         SALIVARY GLAND SURGERY      TONSILLECTOMY      UPPER GASTROINTESTINAL ENDOSCOPY         Time Tracking:       OT Date of Treatment: 10/10/23  OT Start Time: 1043  OT Stop Time: 1101  OT Total Time (min): 18 min    Billable Minutes:Evaluation 8  Neuromuscular Re-education 10      10/10/2023

## 2023-10-10 NOTE — NURSING
No issues overnight, IVF infusing as per orders, UA sent to lab, small wound to left buttock zinc barrier cream applied, pure wick in place,. VSS, see flowsheets. Patient is AO x 3-4 at this time. Tele maintained per order.. Fall/safety precautions maintained. No signs of injury noted over night. Patient was repositioned for comfort with bed locked in low position, side rails up x 3, bed alarm on, and call light within reach. Instructed patient to call staff for mobility, verbalized understanding. No acute signs of distress noted at this time.

## 2023-10-10 NOTE — PLAN OF CARE
Problem: Fluid and Electrolyte Imbalance (Acute Kidney Injury/Impairment)  Goal: Fluid and Electrolyte Balance  10/10/2023 0512 by Olivia Kapoor, RN  Outcome: Ongoing, Progressing  10/9/2023 2352 by Olivia Kapoor, RN  Outcome: Ongoing, Progressing      Acute kidney injury superimposed on chronic kidney disease       UA sent, IVF of NS at 75 ml/hr, daily labs.

## 2023-10-10 NOTE — PLAN OF CARE
PT evaluation complete and appropriate goals established.    Problem: Physical Therapy  Goal: Physical Therapy Goal  Description: Goals to be met by: 11/10/2023     Patient will increase functional independence with mobility by performin. Supine to sit with MInimal Assistance  2. Sit to supine with MInimal Assistance  3. Sit to stand transfer with Minimal Assistance  4. Bed to chair transfer with Moderate Assistance using LRAD  5. Gait  x 15 feet with Moderate Assistance using LRAD   6. Sitting at edge of bed x10 minutes with Supervision  7. Lower extremity exercise program x15 reps per handout, with assistance as needed    Outcome: Ongoing, Progressing     10/10/2023

## 2023-10-10 NOTE — PT/OT/SLP EVAL
Physical Therapy Co-Evaluation and Co-Treatment    Patient Name:  Selma Lux   MRN:  [id number]    Co-evaluation and co-treatment performed for this visit due to suspected patient need for two skilled therapists to ensure patient and staff safety and to accommodate for patient activity tolerance/pain management   Recommendations:     Discharge Recommendations: nursing facility, skilled   Discharge Equipment Recommendations: to be determined by next level of care   Barriers to discharge: Increased level of assist and Decreased caregiver support at current functional level    Assessment:     Selma Lux is a 88 y.o. female admitted with a medical diagnosis of UTI (urinary tract infection). She presents with the following impairments/functional limitations: weakness, impaired endurance, impaired sensation, impaired self care skills, impaired functional mobility, gait instability, impaired balance, impaired cognition, impaired coordination, decreased upper extremity function, decreased lower extremity function, decreased safety awareness, impaired fine motor, decreased ROM, abnormal tone. Pt with fair tolerance to PT/OT evaluation on this date. Pt AAOx1 with frequent nonsensical verbalizations throughout session. Pt with limited participation in treatment despite max encouragement, education, and cueing. Pt requiring total assist for functional mobility at this time 2/2 poor command following, absent motor initiation on LLE, AMS, and poor aerobic endurance. Recommend SNF following discharge once medically stable pending improved participation with therapy and cognitive status for duration of hospital stay. Pt would continue to benefit from skilled acute PT in order to address current deficits and progress functional mobility.     Rehab Prognosis: Fair; patient would benefit from acute skilled PT services 3 x/week to address these deficits and reach maximum level of function.  Recent Surgery: * No surgery  "found *      Plan:     During this hospitalization, patient to be seen 3 x/week to address the identified rehab impairments via gait training, therapeutic activities, therapeutic exercises, neuromuscular re-education and progress toward the following goals:    Plan of Care Expires:  11/10/23    Subjective     Chief Complaint: None verbalized  Patient/Family Comments/Goals: "Its like za za zoom and skip de do"  Pain/Comfort:  Pain Rating 1: 0/10    Patients cultural, spiritual, Moravian conflicts given the current situation: no    Living Environment:  Patient came from [facility]. Home Information obtained from previous admission (9/4/2023) via EMR 2/2 pt unable to provide history  Living Environment: Patient lives with their 2 daughters   in a single story house with number of outside stair(s): 5 with BHR and tub-shower combo.  Prior Level of Function: Prior to admission, patient was requiring assist with stand pivot transfers and was dependent with w/c propulsion. Pt was independent with feeding, but required assist with dressing and bathing.  Equipment Used at Home: bedside commode, walker, rolling, cane, quad, cane, straight (obtained from previous admission via EMR).  DME owned (not currently used): none  Assistance Upon Discharge: assist from daughters and previously had paid caregiver daily 9:30am-3:30pm    Objective:     Communicated with nursing prior to session. Patient found HOB elevated with blood pressure cuff, telemetry, PureWick, peripheral IV, SCD upon PT entry to room.    General Precautions: Standard, aspiration, fall  Orthopedic Precautions:N/A    Braces: N/A    Exams:  Cognitive Exam:  Patient is oriented to Not oriented to place, Not oriented to time, Not oriented to situation, oriented to person when provided with binary options , follows commands 25% of the time  RLE ROM: WFL  RLE Strength: 3/5  LLE ROM: WFL  LLE Strength: ~0/5; pt unable to fully follow commands   Sensation: Unable to " formally assess    Functional Mobility:  Bed Mobility:    Rolling Left:  total assistance  Rolling Right: total assistance  Supine to Sit: total assistance  Sit to Supine: total assistance  Verbal and tactile cueing provided for technique, sequencing, and improved use of BUE  Transfers:   Deferred 2/2 pt safety and limited participation  Balance:   Static Sitting: Good, able to maintain for 6 minute(s) with  SBA-CGA  Verbal cueing provided for upright posture, increased head extension, improved use of BUE to support midline orientation, and command following  Dynamic Sitting: Good: Patient accepts moderate challenge, contact guard assistance    Therapeutic Activities and Exercises:  Patient educated on role of acute care PT and PT POC, safety while in hospital including calling nurse for mobility, and call light usage  Answered all questions within PT scope of practice and addressed functional mobility concerns.  Pt oriented to person, place, time, and situation.      AM-PAC 6 CLICK MOBILITY  Total Score:7     Patient left HOB elevated with all lines intact, call button in reach, RN notified, and bed alarm on.    GOALS:   Multidisciplinary Problems       Physical Therapy Goals          Problem: Physical Therapy    Goal Priority Disciplines Outcome Goal Variances Interventions   Physical Therapy Goal     PT, PT/OT Ongoing, Progressing     Description: Goals to be met by: 11/10/2023     Patient will increase functional independence with mobility by performin. Supine to sit with MInimal Assistance  2. Sit to supine with MInimal Assistance  3. Sit to stand transfer with Minimal Assistance  4. Bed to chair transfer with Moderate Assistance using LRAD  5. Gait  x 15 feet with Moderate Assistance using LRAD   6. Sitting at edge of bed x10 minutes with Supervision  7. Lower extremity exercise program x15 reps per handout, with assistance as needed                         History:     Past Medical History:   Diagnosis  Date    Acute cystitis without hematuria 2/27/2020    Acute hypoxemic respiratory failure 4/11/2018    Acute respiratory failure with hypoxia 3/2/2020    KERMIT (acute kidney injury) 3/13/2019    Altered mental status     Anemia     Arthritis     Bilateral hand pain 3/14/2018    Branch retinal vein occlusion, left eye 2/20/2015    Chronic bilateral low back pain without sciatica 3/23/2017    Chronic renal failure in pediatric patient, stage 3 (moderate) 4/15/2018    Chronic renal failure syndrome, stage 3 (moderate) 4/15/2018    Chronic systolic (congestive) heart failure 8/6/2021    Last Assessment & Plan:  Formatting of this note might be different from the original. 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 Hospitalization late 2021, w/ rehab to follow (no notes available) --12/13/2021-CT head revealed thin extra-axial hyperdense collection overlying the right cerebral convexity compatible with acute subdural hematoma, neurosurgery was consulted, patient was noted with Keppra 500 mg b.i.d., apixaban was held -12/19/    Iron deficiency anemia secondary to inadequate dietary iron intake 6/24/2013    Joint pain     Keratoconjunctivitis sicca of both eyes not specified  as Sjogren's 7/29/2016    Leiomyoma of uterus, unspecified 9/16/2014    Long QT interval 6/29/2016    Due to medication (plaquenil)     Macular edema 1/12/2015    Multinodular goiter 1/12/2016    Neuropathy 8/23/2017    Plaquenil causing adverse effect in therapeutic use 10/7/2016    Pneumococcal vaccine refused 8/17/2016    Pneumonia due to Streptococcus pneumoniae 4/5/2018    Poor nutrition 12/23/2018    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/2015    RA (rheumatoid arthritis) 12/13/2021    -managed by rhematology, since this is a duplicate problem list entry, will archive this     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       Past Surgical History:   Procedure Laterality Date    BREAST CYST EXCISION      CATARACT EXTRACTION      COLONOSCOPY N/A 9/29/2015    Procedure: COLONOSCOPY;  Surgeon: FIDELINA Sanchez MD;  Location: [facility] ENDO ([address] [place] [address]);  Service: Endoscopy;  Laterality: N/A;    EYE SURGERY      INJECTION OF ANESTHETIC AGENT AROUND NERVE Left 4/19/2021    Procedure: BLOCK, NERVE, FEMORAL AND OBTURATOR;  Surgeon: Shivam Gonzalez MD;  Location: [facility];  Service: Pain Management;  Laterality: Left;  consent needed    JOINT REPLACEMENT      right knee    KNEE SURGERY Left 12/31/2013    TKR    left parotidectomy      mixed tumor    VT EVAL,SWALLOW FUNCTION,CINE/VIDEO RECORD  6/5/2021         SALIVARY GLAND SURGERY      TONSILLECTOMY      UPPER GASTROINTESTINAL ENDOSCOPY         Time Tracking:     PT Received On: 10/10/23  PT Start Time: 1043     PT Stop Time: 1100  PT Total Time (min): 17 min     Billable Minutes: Evaluation 5 Therapeutic Activity  12      10/10/2023

## 2023-10-10 NOTE — PLAN OF CARE
Problem: Adjustment to Illness (Delirium)  Goal: Optimal Coping  Outcome: Ongoing, Progressing     Problem: Altered Behavior (Delirium)  Goal: Improved Behavioral Control  Outcome: Ongoing, Progressing     Problem: Attention and Thought Clarity Impairment (Delirium)  Goal: Improved Attention and Thought Clarity  Outcome: Ongoing, Progressing     Problem: Sleep Disturbance (Delirium)  Goal: Improved Sleep  Outcome: Ongoing, Progressing     Problem: Adult Inpatient Plan of Care  Goal: Plan of Care Review  Outcome: Ongoing, Progressing  Goal: Patient-Specific Goal (Individualized)  Outcome: Ongoing, Progressing  Goal: Absence of Hospital-Acquired Illness or Injury  Outcome: Ongoing, Progressing  Goal: Optimal Comfort and Wellbeing  Outcome: Ongoing, Progressing  Goal: Readiness for Transition of Care  Outcome: Ongoing, Progressing     Problem: Impaired Wound Healing  Goal: Optimal Wound Healing  Outcome: Ongoing, Progressing     Problem: Fluid and Electrolyte Imbalance (Acute Kidney Injury/Impairment)  Goal: Fluid and Electrolyte Balance  Outcome: Ongoing, Progressing     Problem: Oral Intake Inadequate (Acute Kidney Injury/Impairment)  Goal: Optimal Nutrition Intake  Outcome: Ongoing, Progressing     Problem: Renal Function Impairment (Acute Kidney Injury/Impairment)  Goal: Effective Renal Function  Outcome: Ongoing, Progressing     Problem: Skin Injury Risk Increased  Goal: Skin Health and Integrity  Outcome: Ongoing, Progressing     Problem: Fall Injury Risk  Goal: Absence of Fall and Fall-Related Injury  Outcome: Ongoing, Progressing     Problem: Asthma Comorbidity  Goal: Maintenance of Asthma Control  Outcome: Ongoing, Progressing     Problem: Behavioral Health Comorbidity  Goal: Maintenance of Behavioral Health Symptom Control  Outcome: Ongoing, Progressing     Problem: COPD (Chronic Obstructive Pulmonary Disease) Comorbidity  Goal: Maintenance of COPD Symptom Control  Outcome: Ongoing, Progressing     Problem:  Heart Failure Comorbidity  Goal: Maintenance of Heart Failure Symptom Control  Outcome: Ongoing, Progressing     Problem: Hypertension Comorbidity  Goal: Blood Pressure in Desired Range  Outcome: Ongoing, Progressing     Problem: Obstructive Sleep Apnea Risk or Actual Comorbidity Management  Goal: Unobstructed Breathing During Sleep  Outcome: Ongoing, Progressing     Problem: Osteoarthritis Comorbidity  Goal: Maintenance of Osteoarthritis Symptom Control  Outcome: Ongoing, Progressing     Problem: Pain Chronic (Persistent) (Comorbidity Management)  Goal: Acceptable Pain Control and Functional Ability  Outcome: Ongoing, Progressing     Problem: Seizure Disorder Comorbidity  Goal: Maintenance of Seizure Control  Outcome: Ongoing, Progressing     Problem: Fluid Volume Deficit  Goal: Fluid Balance  Outcome: Ongoing, Progressing   POC updated and reviewed with the patient/daughter at bedside. Questions regarding POC were encouraged and addressed. VSS, see flowsheets. Patient is AO x 3-4 at this time. Tele maintained per order. Pain/Nausea management using PRNs, effective. See MAR for medication administration details. Fall/safety precautions maintained. No signs of injury noted over night. Patient was repositioned for comfort with bed locked in low position, side rails up x 3, bed alarm on, and call light within reach. Instructed patient to call staff for mobility, verbalized understanding. No acute signs of distress noted at this time.

## 2023-10-10 NOTE — PLAN OF CARE
Problem: Altered Behavior (Delirium)  Goal: Improved Behavioral Control  Outcome: Ongoing, Progressing     Problem: Attention and Thought Clarity Impairment (Delirium)  Goal: Improved Attention and Thought Clarity  Outcome: Ongoing, Progressing     Pt more alert this  evening  but  remains disoriented  x3. Pt speaking  incoherently. IV abx tolerated. VSS. No s/s  of distress at this time. Family at bedside

## 2023-10-10 NOTE — CONSULTS
"[facility] - Neurosurgery ([facility])  Adult Nutrition  Consult Note    SUMMARY     Recommendations    When medically able, ADAT to regular. Encourage adequate PO intake.   If PO intake 50% or less, add Boost Plus BID.   RD following.     Goals: Will meet % EEN/EPN by next RD f/u.  Nutrition Goal Status: new  Communication of RD Recs:  (POC)    Assessment and Plan    Nutrition Problem  Inadequate oral intake     Related to (etiology):   Inability to consume sufficient needs     Signs and Symptoms (as evidenced by):   NPO status      Interventions/Recommendations (treatment strategy):  Collaboration of nutrition care with other providers      Nutrition Diagnosis Status:   New    Reason for Assessment    Reason For Assessment: consult  Diagnosis:  (Generalized weakness)  Relevant Medical History: AF, vascular dementia, KERMIT on CKD, h/o stroke  Interdisciplinary Rounds: did not attend  General Information Comments: RD consulted for decreased oral intake. Unable to speak with pt 2/2 increased confusion. Noted reports of pt with poor PO intake over the past few days. Pt currently NPO but noted SLP recs for regular textured diet. Noted pt only accepted couple sips of sprite and couple bites of pudding/eryn cracker before refusing further PO trials. No issues with n/v/d/c/chewing/swallowing at this time. #; #. No s/s of malnutrition at this time. Pt at risk for malnutrition if poor PO intake continues by next RD f/u. LBM 10/8.  Nutrition Discharge Planning: Adequate nutrition    Nutrition Risk Screen    Nutrition Risk Screen: no indicators present    Nutrition/Diet History    Patient Reported Diet/Restrictions/Preferences: general  Spiritual, Cultural Beliefs, Congregation Practices, Values that Affect Care: no  Food Allergies: NKFA  Factors Affecting Nutritional Intake: NPO    Anthropometrics    Temp: 99 °F (37.2 °C)  Height: 5' 4.02" (162.6 cm)  Height (inches): 64.02 in  Weight Method: Bed " Scale  Weight: 72 kg (158 lb 11.7 oz)  Weight (lb): 158.73 lb  Ideal Body Weight (IBW), Female: 120.1 lb  % Ideal Body Weight, Female (lb): 132.16 %  BMI (Calculated): 27.2  BMI Grade: 25 - 29.9 - overweight    Lab/Procedures/Meds    Pertinent Labs Reviewed: reviewed  Pertinent Labs Comments: Sodium 132, Creatinine 1.6, GFR 31.2, Calcium 7.9, Albumin 2.6, ALT 8  Pertinent Medications Reviewed: reviewed  Pertinent Medications Comments: atorvastatin, mycophenolate, pantoprazole, thiamine, vancomycin    Estimated/Assessed Needs    Weight Used For Calorie Calculations: 72 kg (158 lb 11.7 oz)  Energy Calorie Requirements (kcal): 1432 kcals  Energy Need Method: [place] (MSJ x 1.25 PAL)  Protein Requirements: 65 g (0.9 g/kg)  Weight Used For Protein Calculations: 72 kg (158 lb 11.7 oz)  Fluid Requirements (mL): 1 mL/kcal or fluid per MD  Estimated Fluid Requirement Method: RDA Method  RDA Method (mL): 1432    Nutrition Prescription Ordered    Current Diet Order: NPO    Evaluation of Received Nutrient/Fluid Intake    I/O: +466.3 mL since admit  % Intake of Estimated Energy Needs: 0%  % Meal Intake: NPO    Nutrition Risk    Level of Risk/Frequency of Follow-up:  (1 time/week)     Monitor and Evaluation    Food and Nutrient Intake: energy intake, food and beverage intake  Food and Nutrient Adminstration: diet order  Knowledge/Beliefs/Attitudes: beliefs and attitudes, food and nutrition knowledge/skill  Physical Activity and Function: nutrition-related ADLs and IADLs  Anthropometric Measurements: body mass index, weight change, weight, height/length  Biochemical Data, Medical Tests and Procedures: lipid profile, inflammatory profile, glucose/endocrine profile, electrolyte and renal panel, gastrointestinal profile  Nutrition-Focused Physical Findings: overall appearance     Nutrition Follow-Up    RD Follow-up?: Yes    Mira Colon RDN,LDN

## 2023-10-10 NOTE — PLAN OF CARE
Recommendations     When medically able, ADAT to regular. Encourage adequate PO intake.   If PO intake 50% or less, add Boost Plus BID.   RD following.      Goals: Will meet % EEN/EPN by next RD f/u.  Nutrition Goal Status: new  Communication of RD Recs:  (POC)    Mira Heath RDN,LDN

## 2023-10-10 NOTE — PLAN OF CARE
[facility] - Neurosurgery (Hospital)  Initial Discharge Assessment    SW met w/pt and caregiver, Gabriele, in room for Discharge Planning Assessment. Pt was not able to answer questions due to AMS. SW spoke w/pt's daughter, Rosy, via phone as well. Per Dedee, pt lives with her daughters in a [facility] with 5 [address]. Per Dedee, pt requires assistance w/ADLs and used a wheelchair for ambulation. Per Rosy, pt has caregivers with her 6-7 hrs/day daily. Per Rosy, she would like pt to transfer to [facility] SNF, if possible. SW will send referral via [facility]. Pt will have assistance from her daughters / caregivers upon discharge. All questions addressed. SW will follow for needs.    Primary Care Provider: Génesis Rosario MD    Admission Diagnosis: KERMIT (acute kidney injury) [N17.9]  Sepsis [A41.9]  Altered mental status, unspecified altered mental status type [R41.82]    Admission Date: 10/9/2023  Expected Discharge Date: 10/17/2023         Payor: MEDICARE / Plan: MEDICARE PART A & B / Product Type: Government /     Extended Emergency Contact Information  Primary Emergency Contact: AlonzoRosy  Address: [address] [place] [address]  Mobile Phone: [phone]  Relation: Daughter  Secondary Emergency Contact: Madhavi Lomas  Address: [address] [place] [address]  Mobile Phone: [phone]  Relation: Daughter    Discharge Plan A: (P) Skilled Nursing Facility  Discharge Plan B: (P) Home with family      [id number] - [place], [place] - [address]  [address]  [facility] [address]  Phone: [phone] Fax: [phone]    [facility] - [place], [place] - [address] [facility]  [address] [facility] [address]  Phone: [phone] Fax: [phone]      Initial Assessment (most recent)       Adult Discharge  Assessment - 10/10/23 1505          Discharge Assessment    Assessment Type Discharge Planning Assessment     Confirmed/corrected address, phone number and insurance Yes     Confirmed Demographics Correct on Facesheet     Source of Information family;other (see comments)   Dedee (caregiver)    If unable to respond/provide information was family/caregiver contacted? Yes     Contact Name/Number Rosy Rosado (Daughter) [phone]     Communicated LETICIA with patient/caregiver Yes     Reason For Admission UTI     People in Home child(yash), adult     Facility Arrived From: [facility]     Do you expect to return to your current living situation? Yes     Do you have help at home or someone to help you manage your care at home? Yes     Who are your caregiver(s) and their phone number(s)? Rosy Rosado (Daughter) [phone]     Prior to hospitilization cognitive status: Unable to Assess     Current cognitive status: Unable to Assess     Walking or Climbing Stairs ambulation difficulty, requires equipment     Dressing/Bathing bathing difficulty, requires equipment;bathing difficulty, assistance 1 person     Do you have any problems with: --   Family assists    Home Accessibility wheelchair accessible;stairs to enter home     Number of Stairs, Main Entrance five     Stair Railings, Main Entrance railings on both sides of stairs     Home Layout Able to live on 1st floor     Equipment Currently Used at Home bedside commode;hospital bed;wheelchair     Readmission within 30 days? Yes (P)      Patient currently being followed by outpatient case management? Unable to determine (comments) (P)      Do you currently have service(s) that help you manage your care at home? No (P)      Do you take prescription medications? Yes (P)      Do you have prescription coverage? Yes (P)      Coverage Medicare Part A & B (P)      Do you have any problems affording any of your prescribed medications? TBD (P)      Who is going to  help you get home at discharge? Rosy Rosado (Daughter) [phone] (P)      How do you get to doctors appointments? family or friend will provide (P)      Are you on dialysis? No (P)      Do you take coumadin? No (P)      DME Needed Upon Discharge  other (see comments) (P)    TBD    Discharge Plan discussed with: Caregiver;Adult children (P)      Name(s) and Number(s) Rosy Rosado (Daughter) [phone] / Saeee (Caregiver) (P)      Discharge Plan A Skilled Nursing Facility (P)      Discharge Plan B Home with family (P)         OTHER    Name(s) of People in Home Rosy Rosado (Daughter) [phone] (P)                      Readmission Assessment (most recent)       Readmission Assessment - 10/10/23 1518          Readmission    Why were you hospitalized in the last 30 days? Generalized weakness     Why were you readmitted? New medical problem     When you left the hospital where did you go? SNF     Did patient/caregiver refused recommended DC plan? No     Was this a planned readmission? No                        Frances Trejo, KALI, CSW    Case Management Department

## 2023-10-10 NOTE — NURSING
Pt confused, unable  to make coherent sentences. Pt refusing  PO intake. Unable to take  PO medications. MD  notified. IV  abx infusing

## 2023-10-10 NOTE — SUBJECTIVE & OBJECTIVE
Interval History: Pt initially declining straight cath for UA, so there was a delay in obtaining UA - obtained around 0500 and revealing for >100 WBCs, 20 RBCs, good sample (only 1 sq epi).  New leukopenia today (3k), ongoing tachycardia (90s).   BP initially 90/40 in ED, given IVFs and stress dose steroids, improved to 150s max overnight, but back down to SBP 110s this am.      Dx sepsis 2/2 UTI, with KERMIT and acute encephalopathy, in immunocompromised pt on cellcept/plaquenil for RA.  Started CTX/Vanc STAT.  CTX for g negative coverage (h/o E coli UTI with MDR), Vanc for empiric Enterococcus coverage.  F/u UCx and BCx closely. HOLD cellcept.     KERMIT resolving, Cr 1.6 (from 2.4)    Discussed plan with caregiver Kathrin at bedside and daughter Madhavi on speaker phone  Review of Systems   All other systems reviewed and are negative.    Objective:     Vital Signs (Most Recent):  Temp: 99 °F (37.2 °C) (10/10/23 0729)  Pulse: 96 (10/10/23 0729)  Resp: 18 (10/10/23 0729)  BP: (!) 119/54 (10/10/23 0729)  SpO2: 95 % (10/10/23 0729) Vital Signs (24h Range):  Temp:  [97.5 °F (36.4 °C)-99 °F (37.2 °C)] 99 °F (37.2 °C)  Pulse:  [] 96  Resp:  [16-18] 18  SpO2:  [95 %-99 %] 95 %  BP: ()/(40-92) 119/54     Weight: 72 kg (158 lb 11.7 oz)  Body mass index is 27.23 kg/m².    Intake/Output Summary (Last 24 hours) at 10/10/2023 0911  Last data filed at 10/10/2023 0543  Gross per 24 hour   Intake 766.25 ml   Output 300 ml   Net 466.25 ml         Physical Exam  Vitals reviewed.     Nonsensical speech, poor attention, moving around in bed        Significant Labs: All pertinent labs within the past 24 hours have been reviewed.  Blood Culture:   Recent Labs   Lab 10/09/23  1317   LABBLOO No Growth to date  No Growth to date  No Growth to date  No Growth to date     BMP:   Recent Labs   Lab 10/10/23  0530   GLU 88   *   K 5.0      CO2 18*   BUN 21   CREATININE 1.6*   CALCIUM 7.9*   MG 1.8     CBC:   Recent Labs    Lab 10/09/23  1317 10/09/23  1325 10/10/23  0530   WBC 8.07  --  2.95*   HGB 8.9*  --  9.1*   HCT 29.4* 26* 29.4*   *  --  127*     CMP:   Recent Labs   Lab 10/09/23  1317 10/10/23  0530   * 132*   K 4.8 5.0    104   CO2 21* 18*   GLU 75 88   BUN 28* 21   CREATININE 2.4* 1.6*   CALCIUM 8.1* 7.9*   PROT 5.2*  --    ALBUMIN 2.6*  --    BILITOT 0.9  --    ALKPHOS 58  --    AST 14  --    ALT 8*  --    ANIONGAP 8 10     Magnesium:   Recent Labs   Lab 10/09/23  1317 10/10/23  0530   MG 1.9 1.8     Urine Culture: in process  Urine Studies:   Recent Labs   Lab 10/10/23  0458   COLORU Yellow   APPEARANCEUA Cloudy*   PHUR 5.0   SPECGRAV 1.015   PROTEINUA 1+*   GLUCUA Negative   KETONESU Negative   BILIRUBINUA Negative   OCCULTUA Negative   NITRITE Negative   LEUKOCYTESUR 3+*   RBCUA 20*   WBCUA >100*   BACTERIA Few*   SQUAMEPITHEL 1   HYALINECASTS 0       Significant Imaging: I have reviewed all pertinent imaging results/findings within the past 24 hours.

## 2023-10-10 NOTE — PLAN OF CARE
10/10/23 1528   Post-Acute Status   Post-Acute Authorization Placement   Post-Acute Placement Status Referrals Sent   Coverage MEDICARE - MEDICARE PART A & B   Discharge Delays None known at this time   Discharge Plan   Discharge Plan A Skilled Nursing Facility   Discharge Plan B Home with family     SW spoke w/pt's daughter, Rosy, via phone to review discharge recommendation of SNF and is agreeable to plan. Pt is currently a skilled pt at [facility]; but pt's daughter would like pt transferred to [facility], if possible. SW sent return SNF referral to [facility] as well.    Patient/family provided list of facilities in-network with patient's payor plan. Providers that are owned, operated, or affiliated with [facility] are included on the list.     Notified that referral sent to below listed facilities from in-network list based on proximity to home/family support:     1. [facility]  2. [facility]    Patient/family instructed to identify preference.    Preferred Facility: (if more than 1, listed in order of descending preference)  [facility]    If an additional preferred facility not listed above is identified, additional referral to be sent. If above facilities unable to accept, will send additional referrals to in-network providers.     Frances Trejo, KALI, CSW    Case Management Department

## 2023-10-10 NOTE — CONSULTS
[facility] - Neurosurgery ([facility])  Wound Care    Patient Name:  Selma Lux   MRN:  [id number]  Date: 10/10/2023  Diagnosis: UTI (urinary tract infection)    History:     Past Medical History:   Diagnosis Date    Acute cystitis without hematuria 2/27/2020    Acute hypoxemic respiratory failure 4/11/2018    Acute respiratory failure with hypoxia 3/2/2020    KERMIT (acute kidney injury) 3/13/2019    Altered mental status     Anemia     Arthritis     Bilateral hand pain 3/14/2018    Branch retinal vein occlusion, left eye 2/20/2015    Chronic bilateral low back pain without sciatica 3/23/2017    Chronic renal failure in pediatric patient, stage 3 (moderate) 4/15/2018    Chronic renal failure syndrome, stage 3 (moderate) 4/15/2018    Chronic systolic (congestive) heart failure 8/6/2021    Last Assessment & Plan:  Formatting of this note might be different from the original. 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 Hospitalization late 2021, w/ rehab to follow (no notes available) --12/13/2021-CT head revealed thin extra-axial hyperdense collection overlying the right cerebral convexity compatible with acute subdural hematoma, neurosurgery was consulted, patient  was noted with Keppra 500 mg b.i.d., apixaban was held -12/19/    Iron deficiency anemia secondary to inadequate dietary iron intake 6/24/2013    Joint pain     Keratoconjunctivitis sicca of both eyes not specified as Sjogren's 7/29/2016    Leiomyoma of uterus, unspecified 9/16/2014    Long QT interval 6/29/2016    Due to medication (plaquenil)     Macular edema 1/12/2015    Multinodular goiter 1/12/2016    Neuropathy 8/23/2017    Plaquenil causing adverse effect in therapeutic use 10/7/2016    Pneumococcal vaccine refused 8/17/2016    Pneumonia due to Streptococcus pneumoniae 4/5/2018    Poor nutrition 12/23/2018    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/2015    RA (rheumatoid arthritis) 12/13/2021    -managed by rhematology, since this is a duplicate problem list entry, will archive this     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       Social History     Socioeconomic History    Marital status:    Tobacco Use    Smoking status: Never     Passive exposure: Never    Smokeless tobacco: Never   Substance and Sexual Activity    Alcohol use: Yes     Alcohol/week: 0.0 standard drinks of alcohol     Comment: very seldom     Drug use: No    Sexual activity: Not Currently     Comment: ,  age 52,    Other Topics Concern    Are you pregnant or think you may be? No    Breast-feeding No   Social History Narrative    ** Merged History Encounter **          Social Determinants of Health     Financial Resource Strain: Low Risk  (7/23/2023)    Overall Financial Resource Strain (CARDIA)     Difficulty of Paying Living Expenses: Not hard at all   Food  Insecurity: No Food Insecurity (7/23/2023)    Hunger Vital Sign     Worried About Running Out of Food in the Last Year: Never true     Ran Out of Food in the Last Year: Never true   Transportation Needs: No Transportation Needs (7/23/2023)    PRAPARE - Transportation     Lack of Transportation (Medical): No     Lack of Transportation (Non-Medical): No   Physical Activity: Insufficiently Active (7/23/2023)    Exercise Vital Sign     Days of Exercise per Week: 2 days     Minutes of Exercise per Session: 30 min   Stress: Stress Concern Present (7/23/2023)    [place] [employer] - Occupational Stress Questionnaire     Feeling of Stress : To some extent   Social Connections: Unknown (7/23/2023)    Social Connection and Isolation Panel [NHANES]     Frequency of Communication with Friends and Family: More than three times a week     Frequency of Social Gatherings with Friends and Family: More than three times a week     Active Member of Clubs or Organizations: Yes     Attends Club or Organization Meetings: 1 to 4 times per year     Marital Status:    Housing Stability: Low Risk  (7/23/2023)    Housing Stability Vital Sign     Unable to Pay for Housing in the Last Year: No     Number of Places Lived in the Last Year: 1     Unstable Housing in the Last Year: No       Precautions:     Allergies as of 10/09/2023    (No Known Allergies)       [facility] Assessment Details/Treatment     Patient seen for wound care consultation for L buttocks.   Reviewed chart for this encounter.   See Flow Sheet for findings.    Pt found lying in bed w/ daughter at bedside, agreeable to care at this time. Daughter states pt has had a small wound to her L buttocks and has been complaining about discomfort to the area for a couple of months. Pt turned into lateral position for assessment, pt has small, partial thickness wound w/ irregular boarders. Edges are dry, center moist. Triad applied. Pt returned to supine position and  turned w/ wedge and pillow use for pressure offloading. Waffle mattress, heel lift boots ordered.    RECOMMENDATIONS:  Q2 hr turns   BID/PRN - L buttock - cleanse gently w/ soap and water or baby wipes, do not scrub, residual Triad is OK. Pat dry and apply thick layer of Triad, leave GENE.  Bedside nursing to apply waffle mattress, heel lift boots upon arrival to the floor.    Discussed POC with patient and primary nurse.   See EMR for orders & patient education.    Nursing to continue care & monitoring.  Nursing to maintain pressure injury prevention interventions.       10/10/23 1240   WOCN Assessment   WOCN Total Time (mins) 15   Visit Date 10/10/23   Visit Time 1240   Consult Type New   WOCN Speciality Wound   Intervention assessed;changed;applied;coordination of care;chart review;orders   Teaching on-going        Altered Skin Integrity 08/31/23 2300 Left Buttocks Partial thickness tissue loss. Shallow open ulcer with a red or pink wound bed, without slough. Intact or Open/Ruptured Serum-filled blister.   Date First Assessed/Time First Assessed: 08/31/23 2300   Altered Skin Integrity Present on Admission - Did Patient arrive to the hospital with altered skin?: yes  Side: Left  Location: Buttocks  Description of Altered Skin Integrity: Partial thickness...   Wound Image    Description of Altered Skin Integrity Partial thickness tissue loss. Shallow open ulcer with a red or pink wound bed, without slough. Intact or Open/Ruptured Serum-filled blister.   Dressing Appearance Open to air   Drainage Amount None   Drainage Characteristics/Odor No odor   Appearance Pink;Red;Moist   Tissue loss description Partial thickness   Periwound Area Intact;Dry   Care Cleansed with:;Sterile normal saline;Applied:;Skin Barrier

## 2023-10-10 NOTE — PROGRESS NOTES
Care Ecosystem Follow-up Surveys:  MICHAEL Vigil completed 6 month surveys with caregiver  Jossie (Daughter) on 10/10/2023.   Surveys documented in RedCap and Epic Flowsheets.      ClinCard sent/loaded: yes  Advised caregiver to use ClinCard within the first three months to avoid any fees or deductions. Caregiver expressed understanding.

## 2023-10-11 NOTE — SUBJECTIVE & OBJECTIVE
Interval History: Confusion is not improved despite ABx for UTI. Checking MRI brain, EEG.  Concern for withdrawal from home meds gabapentin and baclofen - resumed both this pm.      Around 1530, HR sustaining 150s-160s, SBO 100s, O2 sat 100% on RA.  pt asymptomatic. EKG and metop IV 5 x 1 --> returned to NSR 80s.    Discussed with dtr Madhavi via phone.  Appreciative of updates. Understands this may be one of pt's last good chances at recovery. Voices insight intro overall decline due to her brain processing differently.      Dispo: SW/CM assisting with family preference for [facility]    Review of Systems   All other systems reviewed and are negative.    Objective:     Vital Signs (Most Recent):  Temp: 98.7 °F (37.1 °C) (10/11/23 0702)  Pulse: 80 (10/11/23 0849)  Resp: 18 (10/11/23 0849)  BP: 121/63 (10/11/23 0702)  SpO2: 96 % (10/11/23 0702) Vital Signs (24h Range):  Temp:  [97.3 °F (36.3 °C)-99 °F (37.2 °C)] 98.7 °F (37.1 °C)  Pulse:  [] 80  Resp:  [16-18] 18  SpO2:  [96 %-98 %] 96 %  BP: (115-154)/(57-72) 121/63     Weight: 72 kg (158 lb 11.7 oz)  Body mass index is 27.23 kg/m².    Intake/Output Summary (Last 24 hours) at 10/11/2023 0936  Last data filed at 10/10/2023 1636  Gross per 24 hour   Intake 222 ml   Output 351 ml   Net -129 ml         Physical Exam  Vitals reviewed.   Constitutional:       Appearance: She is well-developed.   HENT:      Head: Normocephalic and atraumatic.   Eyes:      Conjunctiva/sclera: Conjunctivae normal.      Pupils: Pupils are equal, round, and reactive to light.   Neck:      Thyroid: No thyromegaly.      Vascular: No JVD.   Cardiovascular:      Rate and Rhythm: Normal rate and regular rhythm.      Heart sounds: Normal heart sounds. No murmur heard.     No friction rub. No gallop.   Pulmonary:      Effort: Pulmonary effort is normal.      Breath sounds: Normal breath sounds. No wheezing or rales.   Abdominal:      General: Bowel sounds are normal. There is  no distension.      Palpations: Abdomen is soft.      Tenderness: There is no abdominal tenderness. There is no guarding or rebound.   Musculoskeletal:         General: No tenderness. Normal range of motion.      Cervical back: Neck supple.   Skin:     General: Skin is warm and dry.   Neurological:      Mental Status: She is alert. She is disoriented.      Cranial Nerves: No cranial nerve deficit.      Comments: Hyperactive, picking at bedsheets, poor attention   Psychiatric:         Behavior: Behavior normal.             Significant Labs: All pertinent labs within the past 24 hours have been reviewed.  Blood Culture x 2: ngtd   BMP:   Recent Labs   Lab 10/11/23  0822 10/11/23  1354   GLU  --  88   NA  --  132*   K  --  4.1   CL  --  105   CO2  --  20*   BUN  --  19   CREATININE  --  1.4   CALCIUM  --  8.2*   MG 1.9  --      CBC:   Recent Labs   Lab 10/10/23  0530 10/11/23  1030   WBC 2.95* 7.58   HGB 9.1* 9.1*   HCT 29.4* 29.4*   * 152     CMP:   Recent Labs   Lab 10/10/23  0530 10/11/23  1354   * 132*   K 5.0 4.1    105   CO2 18* 20*   GLU 88 88   BUN 21 19   CREATININE 1.6* 1.4   CALCIUM 7.9* 8.2*   PROT  --  5.5*   ALBUMIN  --  2.8*   BILITOT  --  0.8   ALKPHOS  --  59   AST  --  18   ALT  --  9*   ANIONGAP 10 7*     Magnesium:   Recent Labs   Lab 10/10/23  0530 10/11/23  0822   MG 1.8 1.9     Urine Culture:   Recent Labs   Lab 10/10/23  0458   LABURIN No significant growth       Significant Imaging: I have reviewed all pertinent imaging results/findings within the past 24 hours.

## 2023-10-11 NOTE — PLAN OF CARE
Problem: Adjustment to Illness (Delirium)  Goal: Optimal Coping  Outcome: Ongoing, Progressing     Problem: Altered Behavior (Delirium)  Goal: Improved Behavioral Control  Outcome: Ongoing, Progressing

## 2023-10-11 NOTE — ASSESSMENT & PLAN NOTE
Sepsis 2/2 UTI, with organ dysfunction - KERMIT on CKD and acute encephalopathy  - CTX IV  - Vanc IV  - f/u UCx and BCx closely

## 2023-10-11 NOTE — ASSESSMENT & PLAN NOTE
Patient has hyponatremia which is controlled,We will aim to correct the sodium by 4-6mEq in 24 hours. We will monitor sodium Daily. The hyponatremia is due to Dehydration/hypovolemia. We will treat the hyponatremia with IVFs and Tx of underlying conditions. The patient's sodium results have been reviewed and are listed below.  Recent Labs   Lab 10/10/23  0530   *

## 2023-10-11 NOTE — PROGRESS NOTES
Francisco Lyons - Neurosurgery ([facility])  [facility] Medicine  Progress Note    Patient Name: Selma Lux  MRN: [id number]  Patient Class: IP- Inpatient   Admission Date: 10/9/2023  Length of Stay: 1 days  Attending Physician: Laura Puente MD  Primary Care Provider: Génesis Rosario MD        Subjective:     Principal Problem:UTI (urinary tract infection)        HPI:  88-year-old black female with chronic left-sided weakness due to history of stroke, adrenal insufficiency (on chronic steroids), rheumatoid arthritis (on Plaquenil and mycophenolate), and recent debility being admitted for generalized weakness diminishing mental status possibly secondary to metabolic encephalopathy.  Patient is presenting from [facility]; was recently discharged from hospital stay for similar picture about a month ago.  At that time no definitive infectious etiology was found, and it was concluded the patient was possibly experiencing and adrenal crisis and/or elderly delirium/dementia with a mild KERMIT at the time.  Daughter Rosy was at the bedside now reports that she started having decreased p.o. intake over the past few days, the [facility] facility had obtained a UA that was suspicious for infection and sent it off but lost the specimen.  Patient was dosed with Cipro for about 3 days with no sustained improvement in her mental status/energy level/participation/p.o. intake. Dtr reports patient only complained of fatigue.  No chest pain or shortness a breath noted.    In the ED here her vital signs were stable.  Creatinine bump is noted at 2.4 whereas baseline is under 2.  Patient has been refusing UA catheterizations and we are still pending collection.  EKG per my personal review shows no acute ischemic findings.  Chest x-ray also does not show any convincing findings for definitive pneumonia.          Overview/Hospital Course:  No notes on file    Interval History: Pt initially declining straight cath for UA, so there was a  delay in obtaining UA - obtained around 0500 and revealing for >100 WBCs, 20 RBCs, good sample (only 1 sq epi).  New leukopenia today (3k), ongoing tachycardia (90s).   BP initially 90/40 in ED, given IVFs and stress dose steroids, improved to 150s max overnight, but back down to SBP 110s this am.      Dx sepsis 2/2 UTI, with KERMIT and acute encephalopathy, in immunocompromised pt on cellcept/plaquenil for RA.  Started CTX/Vanc STAT.  CTX for g negative coverage (h/o E coli UTI with MDR), Vanc for empiric Enterococcus coverage.  F/u UCx and BCx closely. HOLD cellcept.     KERMIT resolving, Cr 1.6 (from 2.4)    Discussed plan with caregiver Kathrin at bedside and daughter Madhavi on speaker phone  Review of Systems   All other systems reviewed and are negative.    Objective:     Vital Signs (Most Recent):  Temp: 99 °F (37.2 °C) (10/10/23 0729)  Pulse: 96 (10/10/23 0729)  Resp: 18 (10/10/23 0729)  BP: (!) 119/54 (10/10/23 0729)  SpO2: 95 % (10/10/23 0729) Vital Signs (24h Range):  Temp:  [97.5 °F (36.4 °C)-99 °F (37.2 °C)] 99 °F (37.2 °C)  Pulse:  [] 96  Resp:  [16-18] 18  SpO2:  [95 %-99 %] 95 %  BP: ()/(40-92) 119/54     Weight: 72 kg (158 lb 11.7 oz)  Body mass index is 27.23 kg/m².    Intake/Output Summary (Last 24 hours) at 10/10/2023 0911  Last data filed at 10/10/2023 0543  Gross per 24 hour   Intake 766.25 ml   Output 300 ml   Net 466.25 ml         Physical Exam  Vitals reviewed.     Nonsensical speech, poor attention, moving around in bed        Significant Labs: All pertinent labs within the past 24 hours have been reviewed.  Blood Culture:   Recent Labs   Lab 10/09/23  1317   LABBLOO No Growth to date  No Growth to date  No Growth to date  No Growth to date     BMP:   Recent Labs   Lab 10/10/23  0530   GLU 88   *   K 5.0      CO2 18*   BUN 21   CREATININE 1.6*   CALCIUM 7.9*   MG 1.8     CBC:   Recent Labs   Lab 10/09/23  1317 10/09/23  1325 10/10/23  0530   WBC 8.07  --  2.95*   HGB  "8.9*  --  9.1*   HCT 29.4* 26* 29.4*   *  --  127*     CMP:   Recent Labs   Lab 10/09/23  1317 10/10/23  0530   * 132*   K 4.8 5.0    104   CO2 21* 18*   GLU 75 88   BUN 28* 21   CREATININE 2.4* 1.6*   CALCIUM 8.1* 7.9*   PROT 5.2*  --    ALBUMIN 2.6*  --    BILITOT 0.9  --    ALKPHOS 58  --    AST 14  --    ALT 8*  --    ANIONGAP 8 10     Magnesium:   Recent Labs   Lab 10/09/23  1317 10/10/23  0530   MG 1.9 1.8     Urine Culture: in process  Urine Studies:   Recent Labs   Lab 10/10/23  0458   COLORU Yellow   APPEARANCEUA Cloudy*   PHUR 5.0   SPECGRAV 1.015   PROTEINUA 1+*   GLUCUA Negative   KETONESU Negative   BILIRUBINUA Negative   OCCULTUA Negative   NITRITE Negative   LEUKOCYTESUR 3+*   RBCUA 20*   WBCUA >100*   BACTERIA Few*   SQUAMEPITHEL 1   HYALINECASTS 0       Significant Imaging: I have reviewed all pertinent imaging results/findings within the past 24 hours.      Assessment/Plan:      * UTI (urinary tract infection)  Sepsis 2/2 UTI, with organ dysfunction - KERMIT on CKD and acute encephalopathy  - CTX IV  - Vanc IV  - f/u UCx and BCx closely      Sepsis  This patient does have evidence of infective focus  My overall impression is sepsis.  Source: Urinary Tract  Antibiotics given-   Antibiotics (72h ago, onward)    Start     Stop Route Frequency Ordered    10/10/23 1000  vancomycin - pharmacy to dose  (vancomycin IVPB (PEDS and ADULTS))        See Hyperspace for full Linked Orders Report.    -- IV pharmacy to manage frequency 10/10/23 0901    10/10/23 1000  cefTRIAXone (ROCEPHIN) 1 g in dextrose 5 % in water (D5W) 100 mL IVPB (MB+)         -- IV Every 24 hours (non-standard times) 10/10/23 0855    10/09/23 1845  sulfamethoxazole-trimethoprim 800-160mg per tablet 1 tablet         -- Oral Every Mon, Wed, Fri 10/09/23 1840        Latest lactate reviewed-  No results for input(s): "LACTATE" in the last 72 hours.  Organ dysfunction indicated by Acute kidney injury and Encephalopathy    Fluid " challenge: s/p IVFs in ED    Post- resuscitation assessment No - Post resuscitation assessment not needed       Will Not start Pressors- Levophed for MAP of 65  Source control achieved by: ABx    Encephalopathy acute  Tx underlying sepsis/UTI  - monitor closely  - minimize CNS-active meds  - if no improvement on ABx just started this am, MRI brain to eval smaller stroke (CT head negative in ED)    Generalized weakness  DDx sepsis/UTI, vol depletion, possible adrenal insufficiency vs crisis albeit only taking low dose prednisone (10mg) at home.  Similar presentation to prior, per Dr Pena admitting MD and per chart review;  Associated w/ worsening confusion and decreased po intake  - Tx Sepsis/UTI as above  - fall precautions  - pt/ot  - stress dose steroids- solucortef last night --> 40 mg high dose prednisone today, will do slow taper over 2 weeks given report that she didn't tolerate a faster taper in the past        Rheumatoid arthritis of multiple sites  Takes mycophenolate and hydroxychloroquine at home for ILD and connective tissue manifestatons of RA  Stress dose steroids (eventually get down to home prednisone 10 mg)  Home plaquenil   HOLD Home Cellcept in setting of infection, resume asap      Vascular Dementia  Home Seroquel qhs p.r.n.  Can consider Depakote if needed      Anemia of renal disease  Stable  monitor      Acute kidney injury superimposed on chronic kidney disease  Due to vol depletion and sepsis- Resolving  Chronic medical disease noted on renal ultrasound on last admission   Baseline seems to be under 2, admit creatinine at 2.4, improved to 1.6 the next day  Limited IV fluid infusion through the night into a.m., trend renal function  Home Bactrim initially held for KERMIT, resumed 10/10 qMWF for PJP prophylaxis in steroid dose >20mg    Chronic obstructive pulmonary disease  No active issues      Heart failure with preserved ejection fraction  Chronic diastolic CHF. No signs of overload. 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 We will monitor sodium Daily. The hyponatremia is due to Dehydration/hypovolemia. We will treat the hyponatremia with IVFs and Tx of underlying conditions. The patient's sodium results have been reviewed and are listed below.  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        Mood disorder        Mixed hyperlipidemia  - home statin      Post-traumatic osteoarthritis of both hips and shoulders        Thrombocytopenia        AF (paroxysmal atrial fibrillation)  Stable rate   Continue home Eliquis    Vascular dementia  - home statin  - not on antiplatelet agents at home, will confirm again with family      Long QT interval  Tele  Keep lytes replaced      PUD (peptic ulcer disease)  Home PPI        VTE Risk Mitigation (From admission, onward)         Ordered     IP VTE HIGH RISK PATIENT  Once         10/10/23 0221     Place sequential compression device  Until discontinued         10/10/23 0221     apixaban tablet 2.5 mg  2 times daily         10/09/23 1651     Reason for No Pharmacological VTE Prophylaxis  Once        Question:  Reasons:  Answer:  Already adequately anticoagulated on oral Anticoagulants    10/09/23 1648                Discharge Planning   LETICIA: 10/17/2023     Code Status: Full Code   Is the patient medically ready for discharge?:     Reason for patient still in hospital (select all that apply): Patient trending condition, Laboratory test and PT / OT  recommendations  Discharge Plan A: Skilled Nursing Facility   Discharge Delays: None known at this time              Laura Puente MD  Department of Hospital Medicine   [facility] - Neurosurgery ([facility])

## 2023-10-11 NOTE — HOSPITAL COURSE
HypoT initially with BP 90/40 in ED, given IVFs and stress dose steroids, improved to SBP 150s max overnight, but back down to SBP 110s the next am.  Pt initially declined straight cath for UA while in the ED, hence a delay in obtaining UA - obtained around 0500 the next day after admit, revealing for a UTI with >100 WBCs, 20 RBCs, good sample (only 1 sq epi).  Ongoing tachycardia (90s). New leukopenia (3k), KERMIT resolving, Cr 1.6 (from 2.4)    Dx sepsis 2/2 UTI, with KERMIT and acute encephalopathy, in immunocompromised pt on cellcept/plaquenil for RA.  Started CTX/Vanc STAT.  CTX for g negative coverage (h/o E coli UTI with MDR), Vanc for empiric Enterococcus coverage.  F/u UCx and BCx closely. HELD cellcept.   Discussed plan with caregiver Kathrin at bedside and daughter Madhavi on speaker phone.    BCx NGTD x5, UCx without growth, vancomycin discontinued. Mentation still not at baseline. EEG showing moderate to severe diffuse background slowing consistent with diffuse cerebral dysfunction and encephalopathy which may be on the basis of toxic, metabolic, or primary neuronal disorder. No gross abnormalities noted on metabolic profile. Follow up MRI ordered. Pt stated that she did not feel like going for the MRI on the morning or 10/13/23 because she had not eaten breakfast.     MRI performed on 10/14/23 with results significant for acute right occipital infarct. Neurology consulted with recs for anticoagulation and SBP <220. New infarct unlikely cause of pt's change in mentation, PT/OT/SLP recommended. TTE performed to rule out cardiac abnormality. Treatment for UTI continued through 10/17/23. Pt's mental status trending towards baseline. Pt accepted into [facility] with tentative arrival date of 10/16/23.

## 2023-10-11 NOTE — ASSESSMENT & PLAN NOTE
Due to vol depletion and sepsis- Resolving  Chronic medical disease noted on renal ultrasound on last admission   Baseline seems to be under 2, admit creatinine at 2.4, improved to 1.6 the next day  Limited IV fluid infusion through the night into a.m., trend renal function  Home Bactrim initially held for KERMIT, resumed 10/10 qMWF for PJP prophylaxis in steroid dose >20mg

## 2023-10-11 NOTE — PLAN OF CARE
Pt currently skilled at [facility], family wants her transferred to [facility] if possible. SW spoke with [facility] admissions dept, they are going to review this morning and contact us with an answer as to whether or not they can accept.     EBONIE will follow.    ÁNGELA Cavazos, MICHAEL  [facility]  [phone]

## 2023-10-11 NOTE — ASSESSMENT & PLAN NOTE
Tx underlying sepsis/UTI  - monitor closely  - minimize CNS-active meds  - if no improvement on ABx just started this am, MRI brain to eval smaller stroke (CT head negative in ED)

## 2023-10-11 NOTE — PROGRESS NOTES
Therapy with vancomycin has been discontinued by provider.  Will sign off, please re-consult, if needed.      Alyssa Tolbert, Pharm. D.   Pharmacist  [facility]-nursing home

## 2023-10-11 NOTE — ASSESSMENT & PLAN NOTE
Takes mycophenolate and hydroxychloroquine at home for ILD and connective tissue manifestatons of RA  Stress dose steroids (eventually get down to home prednisone 10 mg)  Home plaquenil   HOLD Home Cellcept in setting of infection, resume asap

## 2023-10-11 NOTE — ASSESSMENT & PLAN NOTE
"This patient does have evidence of infective focus  My overall impression is sepsis.  Source: Urinary Tract  Antibiotics given-   Antibiotics (72h ago, onward)    Start     Stop Route Frequency Ordered    10/10/23 1000  vancomycin - pharmacy to dose  (vancomycin IVPB (PEDS and ADULTS))        See Arelis for full Linked Orders Report.    -- IV pharmacy to manage frequency 10/10/23 0901    10/10/23 1000  cefTRIAXone (ROCEPHIN) 1 g in dextrose 5 % in water (D5W) 100 mL IVPB (MB+)         -- IV Every 24 hours (non-standard times) 10/10/23 0855    10/09/23 1845  sulfamethoxazole-trimethoprim 800-160mg per tablet 1 tablet         -- Oral Every Mon, Wed, Fri 10/09/23 1840        Latest lactate reviewed-  No results for input(s): "LACTATE" in the last 72 hours.  Organ dysfunction indicated by Acute kidney injury and Encephalopathy    Fluid challenge: s/p IVFs in ED    Post- resuscitation assessment No - Post resuscitation assessment not needed       Will Not start Pressors- Levophed for MAP of 65  Source control achieved by: ABx  "

## 2023-10-11 NOTE — PROGRESS NOTES
Pharmacokinetic Assessment Follow Up: IV Vancomycin    Vancomycin serum concentration assessment(s):    The random level was drawn correctly and can be used to guide therapy at this time. The measurement is within the desired definitive target range of 10 to 20 mcg/mL.    Vancomycin Regimen Plan:    Patients random resulted as therapeutic after the 1.5 g loading dose given yesterday  Daily labs (notably CMP) has yet to result but will plan to continue to pulse dose due to patients poor renal function at baseline  Plan to give a single 750 mg IV vancomycin dose today followed by another VR scheduled w/ 10/12 AM labs  Would recommend to de-escalate based on urine cultures once results are available    Drug levels (last 3 results):  Recent Labs   Lab Result Units 10/11/23  0822   Vancomycin, Random ug/mL 12.6     Pharmacy will continue to follow and monitor vancomycin.    Please contact pharmacy at extension [phone] for questions regarding this assessment.    Thank you for the consult,   Wicho Nelson, PharmD, [facility]      Patient brief summary:  Selma Lux is a 88 y.o. female initiated on antimicrobial therapy with IV Vancomycin for treatment of urinary tract infection    Drug Allergies:   Review of patient's allergies indicates:  No Known Allergies    Actual Body Weight:   72 kg    Renal Function:   Estimated Creatinine Clearance: 24.5 mL/min (A) (based on SCr of 1.6 mg/dL (H)).,     Dialysis Method (if applicable):  N/A    CBC (last 72 hours):  Recent Labs   Lab Result Units 10/09/23  1317 10/10/23  0530   WBC K/uL 8.07 2.95*   Hemoglobin g/dL 8.9* 9.1*   Hematocrit % 29.4* 29.4*   Platelets K/uL 122* 127*   Gran % % 36.5* 66.5   Lymph % % 22.8 22.7   Mono % % 38.7* 8.5   Eosinophil % % 0.4 0.0   Basophil % % 0.4 0.3   Differential Method  Automated Automated       Metabolic Panel (last 72 hours):  Recent Labs   Lab Result Units 10/09/23  1317 10/10/23  0458 10/10/23  0530   Sodium mmol/L 131*  --  132*    Potassium mmol/L 4.8  --  5.0   Chloride mmol/L 102  --  104   CO2 mmol/L 21*  --  18*   Glucose mg/dL 75  --  88   Glucose, UA   --  Negative  --    BUN mg/dL 28*  --  21   Creatinine mg/dL 2.4*  --  1.6*   Albumin g/dL 2.6*  --   --    Total Bilirubin mg/dL 0.9  --   --    Alkaline Phosphatase U/L 58  --   --    AST U/L 14  --   --    ALT U/L 8*  --   --    Magnesium mg/dL 1.9  --  1.8   Phosphorus mg/dL  --   --  4.3       Vancomycin Administrations:  vancomycin given in the last 96 hours                     vancomycin 1,500 mg in dextrose 5 % (D5W) 250 mL IVPB (Vial-Mate) (mg) 1,500 mg New Bag 10/10/23 1049                    Microbiologic Results:  Microbiology Results (last 7 days)       Procedure Component Value Units Date/Time    Urine culture [[id number]] Collected: 09/09/23 1253    Order Status: Completed Specimen: Urine Updated: 10/11/23 0343     Urine Culture, Routine No growth    Narrative:      Specimen Source->Urine    Blood culture x two cultures. Draw prior to antibiotics. [[id number]] Collected: 10/09/23 1317    Order Status: Completed Specimen: Blood from Peripheral, Antecubital, Right Updated: 10/10/23 1412     Blood Culture, Routine No Growth to date      No Growth to date    Narrative:      Aerobic and anaerobic    Blood culture x two cultures. Draw prior to antibiotics. [[id number]] Collected: 10/09/23 1317    Order Status: Completed Specimen: Blood from Peripheral, Antecubital, Right Updated: 10/10/23 1412     Blood Culture, Routine No Growth to date      No Growth to date    Narrative:      Aerobic and anaerobic    Urine culture [[id number]] Collected: 10/10/23 0458    Order Status: No result Specimen: Urine Updated: 10/10/23 0776

## 2023-10-11 NOTE — ASSESSMENT & PLAN NOTE
Chronic diastolic CHF. No signs of overload. Volume depletion on presentation to ED, resuscitated with IVFs  - strict Is/Os   - daily weights, preferably standing   - tele  - keep Mg >2, Phos >3, K >4

## 2023-10-11 NOTE — NURSING
Received call from tele, tachycardic, assessed patient, vitals stable other than HR ( see flowsheet), paged primary, primary stated they would order ekg, mag replacement, and PRN IV metop. Awaiting orders, Rapid paged me concerning patient. No further orders, patient asymptomatic.

## 2023-10-12 NOTE — ASSESSMENT & PLAN NOTE
Patient has hyponatremia which is controlled,We will aim to correct the sodium by 4-6mEq in 24 hours. We will monitor sodium Daily. The hyponatremia is due to Dehydration/hypovolemia. We will treat the hyponatremia with IVFs and Tx of underlying conditions. The patient's sodium results have been reviewed and are listed below.  Recent Labs   Lab 10/11/23  1354   *

## 2023-10-12 NOTE — ASSESSMENT & PLAN NOTE
Home meds: mycophenolate and hydroxychloroquine for ILD and connective tissue manifestatons of RA  Stress dose steroids (eventually get down to home prednisone 10 mg)  Home plaquenil   HOLD Home Cellcept in setting of infection, resume asap

## 2023-10-12 NOTE — ASSESSMENT & PLAN NOTE
DDx sepsis/UTI, vol depletion, possible adrenal insufficiency vs crisis albeit only taking low dose prednisone (10mg) at home.  Similar presentation to prior, per Dr Pena admitting MD and per chart review;  Associated w/ worsening confusion and decreased po intake  - Tx Sepsis/UTI as above  - fall precautions  - pt/ot  - for possible adrenal crisis, stress dose steroids- solucortef x q1 then pred 40mg PO qd x 2 d, then moderate taper to 30 mg PO qd x 2 days then 20mg PO x 2d, then 10mg daily

## 2023-10-12 NOTE — ASSESSMENT & PLAN NOTE
"This patient does have evidence of infective focus  My overall impression is sepsis.  Source: Urinary Tract  Antibiotics given-   Antibiotics (72h ago, onward)    Start     Stop Route Frequency Ordered    10/10/23 1000  cefTRIAXone (ROCEPHIN) 1 g in dextrose 5 % in water (D5W) 100 mL IVPB (MB+)         -- IV Every 24 hours (non-standard times) 10/10/23 0855    10/09/23 1845  sulfamethoxazole-trimethoprim 800-160mg per tablet 1 tablet         -- Oral Every Mon, Wed, Fri 10/09/23 1840        Latest lactate reviewed-  No results for input(s): "LACTATE" in the last 72 hours.  Organ dysfunction indicated by Acute kidney injury and Encephalopathy    Fluid challenge: s/p IVFs in ED    Post- resuscitation assessment No - Post resuscitation assessment not needed       Will Not start Pressors- Levophed for MAP of 65  Source control achieved by: ABx    See above  "

## 2023-10-12 NOTE — SUBJECTIVE & OBJECTIVE
Interval History: Remains confused, but responds to questions with short answers. MRI brain & EEG pending. Plan discussed with caregiver as well as daughters (phone) at bedside.     Dispo: SW[facility]CM [facility]    Review of Systems   All other systems reviewed and are negative.    Objective:     Vital Signs (Most Recent):  Temp: 97.8 °F (36.6 °C) (10/12/23 0752)  Pulse: (!) 53 (10/12/23 0752)  Resp: 18 (10/12/23 0752)  BP: 110/65 (10/12/23 0752)  SpO2: 96 % (10/12/23 0752) Vital Signs (24h Range):  Temp:  [97.3 °F (36.3 °C)-99.2 °F (37.3 °C)] 97.8 °F (36.6 °C)  Pulse:  [] 53  Resp:  [16-20] 18  SpO2:  [96 %-100 %] 96 %  BP: (110-141)/(59-87) 110/65     Weight: 72 kg (158 lb 11.7 oz)  Body mass index is 27.23 kg/m².    Intake/Output Summary (Last 24 hours) at 10/12/2023 1109  Last data filed at 10/12/2023 0513  Gross per 24 hour   Intake 466.53 ml   Output 1400 ml   Net -933.47 ml           Physical Exam  Vitals reviewed.   Constitutional:       Appearance: She is well-developed.   HENT:      Head: Normocephalic and atraumatic.   Eyes:      Conjunctiva/sclera: Conjunctivae normal.      Pupils: Pupils are equal, round, and reactive to light.   Neck:      Thyroid: No thyromegaly.      Vascular: No JVD.   Cardiovascular:      Rate and Rhythm: Normal rate and regular rhythm.      Heart sounds: Normal heart sounds. No murmur heard.     No friction rub. No gallop.   Pulmonary:      Effort: Pulmonary effort is normal.      Breath sounds: Normal breath sounds. No wheezing or rales.   Abdominal:      General: Bowel sounds are normal. There is no distension.      Palpations: Abdomen is soft.      Tenderness: There is no abdominal tenderness. There is no guarding or rebound.   Musculoskeletal:         General: No tenderness. Normal range of motion.      Cervical back: Neck supple.   Skin:     General: Skin is warm and dry.   Neurological:      Mental Status: She is alert.  "She is disoriented.      Cranial Nerves: No cranial nerve deficit.   Psychiatric:         Behavior: Behavior normal.             Significant Labs: All pertinent labs within the past 24 hours have been reviewed.  Blood Culture x 2: ngtd   BMP:   Recent Labs   Lab 10/12/23  0846   GLU 69*   *   K 4.1      CO2 18*   BUN 14   CREATININE 1.0   CALCIUM 8.5*   MG 1.7       CBC:   Recent Labs   Lab 10/11/23  1030 10/12/23  0846   WBC 7.58 6.42   HGB 9.1* 11.0*   HCT 29.4* 35.5*    173       CMP:   Recent Labs   Lab 10/11/23  1354 10/12/23  0846   * 130*   K 4.1 4.1    103   CO2 20* 18*   GLU 88 69*   BUN 19 14   CREATININE 1.4 1.0   CALCIUM 8.2* 8.5*   PROT 5.5*  --    ALBUMIN 2.8*  --    BILITOT 0.8  --    ALKPHOS 59  --    AST 18  --    ALT 9*  --    ANIONGAP 7* 9       Magnesium:   Recent Labs   Lab 10/11/23  0822 10/12/23  0846   MG 1.9 1.7       Urine Culture:   No results for input(s): "LABURIN" in the last 48 hours.      Significant Imaging: I have reviewed all pertinent imaging results/findings within the past 24 hours.  "

## 2023-10-12 NOTE — ASSESSMENT & PLAN NOTE
Home meds: gabapentin 100 qbreakfast and dinner, and 300 qhs (helps with sleep); baclofen 5mg qlunch, dinner, and hs  - resumed meds tonight 10/11 since KERMIT resolved at streamlined regimen   - gabapentin 100 TID, up-titrate as warranted  - baclofen 5 TID, adjust as warranted (caution for toxicity/overdose)

## 2023-10-12 NOTE — PT/OT/SLP PROGRESS
Occupational Therapy      Patient Name:  Selma Lux   MRN:  [id number]    Patient not seen today secondary to pt receiving EEG at time of attempt, scheduled to last ~1.5 hours. OT unable to return later this PM for second attempt. Will follow-up as appropriate at next available treatment date.    10/12/2023

## 2023-10-12 NOTE — PLAN OF CARE
Problem: Adjustment to Illness (Delirium)  Goal: Optimal Coping  Outcome: Ongoing, Progressing     Problem: Altered Behavior (Delirium)  Goal: Improved Behavioral Control  Outcome: Ongoing, Progressing     Problem: Attention and Thought Clarity Impairment (Delirium)  Goal: Improved Attention and Thought Clarity  Outcome: Ongoing, Progressing     Problem: Sleep Disturbance (Delirium)  Goal: Improved Sleep  Outcome: Ongoing, Progressing     Problem: Adult Inpatient Plan of Care  Goal: Plan of Care Review  Outcome: Ongoing, Progressing  Goal: Patient-Specific Goal (Individualized)  Outcome: Ongoing, Progressing  Goal: Absence of Hospital-Acquired Illness or Injury  Outcome: Ongoing, Progressing  Goal: Optimal Comfort and Wellbeing  Outcome: Ongoing, Progressing  Goal: Readiness for Transition of Care  Outcome: Ongoing, Progressing     Problem: Impaired Wound Healing  Goal: Optimal Wound Healing  Outcome: Ongoing, Progressing     Problem: Fluid and Electrolyte Imbalance (Acute Kidney Injury/Impairment)  Goal: Fluid and Electrolyte Balance  Outcome: Ongoing, Progressing     Problem: Oral Intake Inadequate (Acute Kidney Injury/Impairment)  Goal: Optimal Nutrition Intake  Outcome: Ongoing, Progressing     Problem: Renal Function Impairment (Acute Kidney Injury/Impairment)  Goal: Effective Renal Function  Outcome: Ongoing, Progressing     Problem: Skin Injury Risk Increased  Goal: Skin Health and Integrity  Outcome: Ongoing, Progressing     Problem: Fall Injury Risk  Goal: Absence of Fall and Fall-Related Injury  Outcome: Ongoing, Progressing     Problem: Asthma Comorbidity  Goal: Maintenance of Asthma Control  Outcome: Ongoing, Progressing     Problem: Behavioral Health Comorbidity  Goal: Maintenance of Behavioral Health Symptom Control  Outcome: Ongoing, Progressing     Problem: COPD (Chronic Obstructive Pulmonary Disease) Comorbidity  Goal: Maintenance of COPD Symptom Control  Outcome: Ongoing, Progressing     Problem:  Heart Failure Comorbidity  Goal: Maintenance of Heart Failure Symptom Control  Outcome: Ongoing, Progressing     Problem: Hypertension Comorbidity  Goal: Blood Pressure in Desired Range  Outcome: Ongoing, Progressing     Problem: Obstructive Sleep Apnea Risk or Actual Comorbidity Management  Goal: Unobstructed Breathing During Sleep  Outcome: Ongoing, Progressing     Problem: Osteoarthritis Comorbidity  Goal: Maintenance of Osteoarthritis Symptom Control  Outcome: Ongoing, Progressing     Problem: Pain Chronic (Persistent) (Comorbidity Management)  Goal: Acceptable Pain Control and Functional Ability  Outcome: Ongoing, Progressing     Problem: Seizure Disorder Comorbidity  Goal: Maintenance of Seizure Control  Outcome: Ongoing, Progressing     Problem: Fluid Volume Deficit  Goal: Fluid Balance  Outcome: Ongoing, Progressing     Problem: Adjustment to Illness (Sepsis/Septic Shock)  Goal: Optimal Coping  Outcome: Ongoing, Progressing     Problem: Bleeding (Sepsis/Septic Shock)  Goal: Absence of Bleeding  Outcome: Ongoing, Progressing     Problem: Glycemic Control Impaired (Sepsis/Septic Shock)  Goal: Blood Glucose Level Within Desired Range  Outcome: Ongoing, Progressing     Problem: Infection Progression (Sepsis/Septic Shock)  Goal: Absence of Infection Signs and Symptoms  Outcome: Ongoing, Progressing     Problem: Nutrition Impaired (Sepsis/Septic Shock)  Goal: Optimal Nutrition Intake  Outcome: Ongoing, Progressing

## 2023-10-12 NOTE — PROCEDURES
EEG    Date/Time: 10/9/2023 12:37 PM    Performed by: Gurmeet Rouse MD  Authorized by: Laura Puente MD      ELECTROENCEPHALOGRAM REPORT    DATE OF SERVICE: 10/11/23  EEG NUMBER: [id number]  REQUESTED BY: Kwame  [place] [facility] [place] [facility] [place]OLOGY   Electroencephalographic (EEG) recording is with electrodes placed according to the International 10-20 placement system.  Thirty two (32) channels of digital signal (sampling rate of 512/sec) including T1 and T2 was simultaneously recorded from the scalp and may include  EKG, EMG, and/or eye monitors.  Recording band pass was 0.1 to 512 hz.  Digital video recording of the patient is simultaneously recorded with the EEG.  The patient is instructed report clinical symptoms which may occur during the recording session.  EEG and video recording is stored and archived in digital format. Activation procedures which include photic stimulation, hyperventilation and instructing patients to perform simple task are done in selected patients.    The EEG is displayed on a monitor screen and can be reviewed using different montages.  Computer assisted analysis is employed to detect spike and electrographic seizure activity.   The entire record is submitted for computer analysis.  The entire recording is visually reviewed and the times identified by computer analysis as being spikes or seizures are reviewed again.  Compresses spectral analysis (CSA) is also performed on the activity recorded from each individual channel.  This is displayed as a power display of frequencies from 0 to 30 Hz over time.   The CSA is reviewed looking for asymmetries in power between homologous areas of the scalp and then compared with the original EEG recording.     [employer] software was also utilized in the review of this study.  This software suite analyzes the EEG recording in multiple domains.  Coherence and rhythmicity is computed to identify EEG sections which may contain organized seizures.  Each  channel undergoes analysis to detect presence of spike and sharp waves which have special and morphological characteristic of epileptic activity.  The routine EEG recording is converted from spacial into frequency domain.  This is then displayed comparing homologous areas to identify areas of significant asymmetry.  Algorithm to identify non-cortically generated artifact is used to separate eye movement, EMG and other artifact from the EEG    EEG FINDINGS  The record shows a poor organization at rest with no discernible posterior dominant rhythm with fair  reactivity. There is mild bilateral beta activity. There is continuous diffuse delta and theta range background slowing.    Drowsiness is characterized by attenuation of the background, vertex waves, and bilateral theta slowing.     Provocative maneuvers including hyperventilation and photic stimulation were not performed.     EKG recording shows a regular rhythm.    There is no push button or clinical event.    IMPRESSION:  Abnormal study due to moderate to severe diffuse background slowing consistent with diffuse cerebral dysfunction and encephalopathy which may be on the basis of toxic, metabolic, or primary neuronal disorder.     Gurmeet Rouse MD

## 2023-10-12 NOTE — PT/OT/SLP PROGRESS
"Speech Language Pathology Treatment    Patient Name:  Selma Lux   MRN:  [id number]  Admitting Diagnosis: Encephalopathy acute    Recommendations:                 General Recommendations:  Dysphagia therapy  Diet recommendations:  Regular Diet - IDDSI Level 7, Liquid Diet Level: Thin liquids - IDDSI Level 0   Aspiration Precautions: 1 bite/sip at a time, Alternating bites/sips, Assistance with meals, Avoid talking while eating, Check for pocketing/oral residue, Eliminate distractions, Feed only when awake/alert, HOB to 90 degrees, Meds crushed in puree, Small bites/sips, and Standard aspiration precautions   General Precautions: Standard, aspiration, fall  Communication strategies:  provide increased time to answer and go to room if call light pushed    Assessment:     Selma Lux is a 88 y.o. female with an SLP diagnosis of Dysphagia.  She presents with mild oral dysphagia.    Subjective     "I don't know"  Patient goals: uto     Pain/Comfort:  Pain Rating 1: 0/10  Pain Rating Post-Intervention 1: 0/10    Respiratory Status: Room air    Objective:     Has the patient been evaluated by SLP for swallowing?   Yes  Keep patient NPO? No   Current Respiratory Status:        Nursing cleared pt for session. She indicated that per report, pt has been eating with the family. Pt seen bedside with no family present. Pt remained confused and needed encouragement to take solids. Pt was repositioned upright in bed. She drank a container of apple juice from a straw without difficulty. Pt initially refused solids but then agreed to one bite of eggs. Pt with increased time needed for mastication. Pt was able to clear oral cavity given increased time. Recommend adding a supplemental shake to help with caloric intake as pt appears to accept liquids more easily. Pt needs full supervision at this time due to confusion and decreased intake. Ensure pt is upright for all meals and make sure oral cavity is clear prior to " giving another bite. Feed only when awake. Ongoing education needed.     Goals:   Multidisciplinary Problems       SLP Goals          Problem: SLP    Goal Priority Disciplines Outcome   SLP Goal     SLP    Description: Goals to be met 10/17  1 pt will tolerate regular diet and thin liquids.                        Plan:     Patient to be seen:  3 x/week   Plan of Care expires:     Plan of Care reviewed with:  patient   SLP Follow-Up:  Yes       Discharge recommendations:  nursing facility, skilled       Time Tracking:     SLP Treatment Date:   10/12/23  Speech Start Time:  0806  Speech Stop Time:  0815     Speech Total Time (min):  9 min    Billable Minutes: Treatment Swallowing Dysfunction 9    10/12/2023

## 2023-10-12 NOTE — PT/OT/SLP PROGRESS
Physical Therapy      Patient Name:  Selma Lux   MRN:  [id number]    Patient not seen today secondary to   patient receiving EEG setup upon attempt, RN advised therapist about length of time for setup. Will follow-up 10/13/23.

## 2023-10-12 NOTE — ASSESSMENT & PLAN NOTE
Tx underlying sepsis/UTI  - monitor closely  - minimize CNS-active meds  - no improvement on ABx  - MRI brain to eval smaller stroke (CT head negative in ED)  - EEG eval seizure  - for possible med withdrawal, resumed gabapentin and baclofen

## 2023-10-12 NOTE — ASSESSMENT & PLAN NOTE
Sepsis 2/2 UTI, with organ dysfunction - KERMIT on CKD and acute encephalopathy. UA with >100 WBCs.  Interestingly, UCx is negative.  Given her frequent UTIs and possible yield impacted by ABx given prior to admit, continue CTX IV while inpatient to complete 5-7 days total of ABx.  BCx negative  - CTX IV  - STOP Vanc IV

## 2023-10-12 NOTE — PLAN OF CARE
Phoned Ginny Sanders (Adrien Camacho Jr) regarding patient acceptance and spoke to admissions coordinator Adelaida . She states Medical Director reviewing case and she should know by close of business today if can accept patient. Sent her my contact phone number through [employer] as she requested.

## 2023-10-12 NOTE — PLAN OF CARE
Problem: Adjustment to Illness (Delirium)  Goal: Optimal Coping  10/12/2023 0544 by Olivia Kapoor RN  Outcome: Ongoing, Progressing   Patient able to follow some commands, she is alert to self, no issues overnight, VSS.

## 2023-10-12 NOTE — ASSESSMENT & PLAN NOTE
Due to vol depletion and sepsis- Resolved  Chronic medical disease noted on renal ultrasound on last admission   Admit creatinine at 2.4  Home Bactrim initially held for KERMIT, resumed 10/10 qMWF for PJP prophylaxis in steroid dose >20mg

## 2023-10-12 NOTE — PROGRESS NOTES
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  Patient is presenting from [facility], was recently discharged from hospital stay for similar picture about a month ago.  At that time no definitive infectious etiology was found, and it was concluded the patient was possibly experiencing and adrenal crisis and/or elderly delirium/dementia with a mild KERMIT at the time.  Daughter Rosy was at the bedside now reports that she started having decreased p.o. intake over the past few days, the SNF acility had obtained a UA that was suspicious for infection and sent it off but lost the specimen.  Patient was dosed with Cipro for about 3 days with no sustained improvement in her mental status/energy level/participation/p.o. intake. Dtr reports patient only complained of fatigue.  No chest pain or shortness a breath noted.    In ED: Hypotensive 90/40, responded to IVFs.  Creatinine bump is noted at 2.4 whereas baseline is under 2.  Patient has been refusing UA catheterizations and we are still pending collection.  EKG per my personal review shows no acute ischemic findings.  Chest x-ray also does not show any convincing findings for definitive pneumonia.          Overview/Hospital Course:  HypoT  initially with BP 90/40 in ED, given IVFs and stress dose steroids, improved to SBP 150s max overnight, but back down to SBP 110s the next am.  Pt initially declined straight cath for UA while in the ED, hence a delay in obtaining UA - obtained around 0500 the next day after admit, revealing for a UTI with >100 WBCs, 20 RBCs, good sample (only 1 sq epi).  Ongoing tachycardia (90s). New leukopenia (3k), KERMIT resolving, Cr 1.6 (from 2.4)     Dx sepsis 2/2 UTI, with KERMIT and acute encephalopathy, in immunocompromised pt on cellcept/plaquenil for RA.  Started CTX/Vanc STAT.  CTX for g negative coverage (h/o E coli UTI with MDR), Vanc for empiric Enterococcus coverage.  F/u UCx and BCx closely. HELD cellcept.    Discussed plan with caregiver Kathrin at bedside and daughter Madhavi on speaker phone.    UCx without growth, vancomycin discontinued. Mentation still not at baseline. MRI brain & EEG pending.       Interval History: Remains confused, but responds to questions with short answers. MRI brain & EEG pending. Plan discussed with caregiver as well as daughters (phone) at bedside.     Dispo: SW/CM assisting with family preference [facility]    Review of Systems   All other systems reviewed and are negative.    Objective:     Vital Signs (Most Recent):  Temp: 97.8 °F (36.6 °C) (10/12/23 0752)  Pulse: (!) 53 (10/12/23 0752)  Resp: 18 (10/12/23 0752)  BP: 110/65 (10/12/23 0752)  SpO2: 96 % (10/12/23 0752) Vital Signs (24h Range):  Temp:  [97.3 °F (36.3 °C)-99.2 °F (37.3 °C)] 97.8 °F (36.6 °C)  Pulse:  [] 53  Resp:  [16-20] 18  SpO2:  [96 %-100 %] 96 %  BP: (110-141)/(59-87) 110/65     Weight: 72 kg (158 lb 11.7 oz)  Body mass index is 27.23 kg/m².    Intake/Output Summary (Last 24 hours) at 10/12/2023 1109  Last data filed at 10/12/2023 0513  Gross per 24 hour   Intake 466.53 ml   Output 1400 ml   Net -933.47 ml           Physical Exam  Vitals reviewed.   Constitutional:       Appearance: She is  "well-developed.   HENT:      Head: Normocephalic and atraumatic.   Eyes:      Conjunctiva/sclera: Conjunctivae normal.      Pupils: Pupils are equal, round, and reactive to light.   Neck:      Thyroid: No thyromegaly.      Vascular: No JVD.   Cardiovascular:      Rate and Rhythm: Normal rate and regular rhythm.      Heart sounds: Normal heart sounds. No murmur heard.     No friction rub. No gallop.   Pulmonary:      Effort: Pulmonary effort is normal.      Breath sounds: Normal breath sounds. No wheezing or rales.   Abdominal:      General: Bowel sounds are normal. There is no distension.      Palpations: Abdomen is soft.      Tenderness: There is no abdominal tenderness. There is no guarding or rebound.   Musculoskeletal:         General: No tenderness. Normal range of motion.      Cervical back: Neck supple.   Skin:     General: Skin is warm and dry.   Neurological:      Mental Status: She is alert. She is disoriented.      Cranial Nerves: No cranial nerve deficit.   Psychiatric:         Behavior: Behavior normal.             Significant Labs: All pertinent labs within the past 24 hours have been reviewed.  Blood Culture x 2: ngtd   BMP:   Recent Labs   Lab 10/12/23  0846   GLU 69*   *   K 4.1      CO2 18*   BUN 14   CREATININE 1.0   CALCIUM 8.5*   MG 1.7       CBC:   Recent Labs   Lab 10/11/23  1030 10/12/23  0846   WBC 7.58 6.42   HGB 9.1* 11.0*   HCT 29.4* 35.5*    173       CMP:   Recent Labs   Lab 10/11/23  1354 10/12/23  0846   * 130*   K 4.1 4.1    103   CO2 20* 18*   GLU 88 69*   BUN 19 14   CREATININE 1.4 1.0   CALCIUM 8.2* 8.5*   PROT 5.5*  --    ALBUMIN 2.8*  --    BILITOT 0.8  --    ALKPHOS 59  --    AST 18  --    ALT 9*  --    ANIONGAP 7* 9       Magnesium:   Recent Labs   Lab 10/11/23  0822 10/12/23  0846   MG 1.9 1.7       Urine Culture:   No results for input(s): "LABURIN" in the last 48 hours.      Significant Imaging: I have reviewed all pertinent imaging " "results/findings within the past 24 hours.      Assessment/Plan:      * UTI (urinary tract infection)  Sepsis 2/2 UTI, with organ dysfunction - KERMIT on CKD and acute encephalopathy. UA with >100 WBCs.  Interestingly, UCx is negative.  Given her frequent UTIs and possible yield impacted by ABx given prior to admit, continue CTX IV while inpatient to complete 5-7 days total of ABx.  BCx negative  - CTX IV  - STOP Vanc IV      Sepsis  This patient does have evidence of infective focus  My overall impression is sepsis.  Source: Urinary Tract  Antibiotics given-   Antibiotics (72h ago, onward)    Start     Stop Route Frequency Ordered    10/10/23 1000  cefTRIAXone (ROCEPHIN) 1 g in dextrose 5 % in water (D5W) 100 mL IVPB (MB+)         -- IV Every 24 hours (non-standard times) 10/10/23 0855    10/09/23 1845  sulfamethoxazole-trimethoprim 800-160mg per tablet 1 tablet         -- Oral Every Mon, Wed, Fri 10/09/23 1840        Latest lactate reviewed-  No results for input(s): "LACTATE" in the last 72 hours.  Organ dysfunction indicated by Acute kidney injury and Encephalopathy    Fluid challenge: s/p IVFs in ED    Post- resuscitation assessment No - Post resuscitation assessment not needed       Will Not start Pressors- Levophed for MAP of 65  Source control achieved by: ABx    See above    Anemia of renal disease  Stable  monitor      Acute kidney injury superimposed on chronic kidney disease  Due to vol depletion and sepsis- Resolved  Chronic medical disease noted on renal ultrasound on last admission   Admit creatinine at 2.4  Home Bactrim initially held for KERMIT, resumed 10/10 qMWF for PJP prophylaxis in steroid dose >20mg    Chronic obstructive pulmonary disease  No active issues      Heart failure with preserved ejection fraction  Chronic diastolic CHF. No signs of overload. Volume depletion on presentation to ED, resuscitated with IVFs  - strict Is/Os   - daily weights, preferably standing   - tele  - keep Mg >2, Phos " >3, K >4      Rheumatic torticollis        Vascular Dementia  Home Seroquel qhs p.r.n.  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  Similar presentation to prior, per Dr Pena admitting MD and per chart review;  Associated w/ worsening confusion and decreased po intake  - Tx Sepsis/UTI as above  - fall precautions  - pt/ot  - for possible adrenal crisis, stress dose steroids- solucortef x q1 then pred 40mg PO qd x 2 d, then moderate taper to 30 mg PO qd x 2 days then 20mg PO x 2d, then 10mg daily      Encephalopathy acute  Tx underlying sepsis/UTI  - monitor closely  - minimize CNS-active meds  - no improvement on ABx  - MRI brain to eval smaller stroke (CT head negative in ED)  - EEG eval seizure  - for possible med withdrawal, resumed gabapentin and baclofen    Hyponatremia  Chronic, will continue to monitor    Immunocompromised state due to drug therapy        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        Anticoagulant long-term use        Spinal stenosis        Mood disorder        Mixed hyperlipidemia  - home statin      Post-traumatic osteoarthritis of both hips and shoulders  Home meds: gabapentin 100 qbreakfast and dinner, and 300 qhs (helps with sleep); baclofen 5mg qlunch, dinner, and hs  - resumed meds tonight 10/11 since KERMIT resolved at streamlined regimen   - gabapentin 100 TID, up-titrate as warranted  - baclofen 5 TID, adjust as warranted (caution for toxicity/overdose)      Thrombocytopenia  Resolved 10/11  monitor      AF (paroxysmal atrial fibrillation)  Stable rate   Continue home Eliquis  - episode of SVT on 10/11  - consider toprol 12.5 PO daily    Vascular dementia  - home statin  - not on antiplatelet agents  at home, will confirm again with family      Long QT interval  Tele  Keep lytes replaced      Rheumatoid arthritis of multiple sites  Home meds: mycophenolate and hydroxychloroquine for ILD and connective tissue manifestatons of RA  Stress dose steroids (eventually get down to home prednisone 10 mg)  Home plaquenil   HOLD Home Cellcept in setting of infection, resume asap      PUD (peptic ulcer disease)  Home PPI        VTE Risk Mitigation (From admission, onward)         Ordered     IP VTE HIGH RISK PATIENT  Once         10/10/23 0221     Place sequential compression device  Until discontinued         10/10/23 0221     apixaban tablet 2.5 mg  2 times daily         10/09/23 1651     Reason for No Pharmacological VTE Prophylaxis  Once        Question:  Reasons:  Answer:  Already adequately anticoagulated on oral Anticoagulants    10/09/23 1648                Discharge Planning   LETICIA: 10/17/2023     Code Status: Full Code   Is the patient medically ready for discharge?:     Reason for patient still in hospital (select all that apply): Patient trending condition  Discharge Plan A: Skilled Nursing Facility   Discharge Delays: None known at this time              Karen Mcginnis MD  Department of Hospital Medicine   [facility] - Neurosurgery ([facility])

## 2023-10-12 NOTE — PROGRESS NOTES
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  Patient is presenting from [facility], was recently discharged from hospital stay for similar picture about a month ago.  At that time no definitive infectious etiology was found, and it was concluded the patient was possibly experiencing and adrenal crisis and/or elderly delirium/dementia with a mild KERMIT at the time.  Daughter Rosy was at the bedside now reports that she started having decreased p.o. intake over the past few days, the SNF acility had obtained a UA that was suspicious for infection and sent it off but lost the specimen.  Patient was dosed with Cipro for about 3 days with no sustained improvement in her mental status/energy level/participation/p.o. intake. Dtr reports patient only complained of fatigue.  No chest pain or shortness a breath noted.    In ED: Hypotensive 90/40, responded to IVFs.  Creatinine bump is noted at 2.4 whereas baseline is under 2.  Patient has been refusing UA catheterizations and we are still pending collection.  EKG per my personal review shows no acute ischemic findings.  Chest x-ray also does not show any convincing findings for definitive pneumonia.          Overview/Hospital Course:  HypoT  initially with BP 90/40 in ED, given IVFs and stress dose steroids, improved to SBP 150s max overnight, but back down to SBP 110s the next am.  Pt initially declined straight cath for UA while in the ED, hence a delay in obtaining UA - obtained around 0500 the next day after admit, revealing for a UTI with >100 WBCs, 20 RBCs, good sample (only 1 sq epi).  Ongoing tachycardia (90s). New leukopenia (3k), KERMIT resolving, Cr 1.6 (from 2.4)     Dx sepsis 2/2 UTI, with KERMIT and acute encephalopathy, in immunocompromised pt on cellcept/plaquenil for RA.  Started CTX/Vanc STAT.  CTX for g negative coverage (h/o E coli UTI with MDR), Vanc for empiric Enterococcus coverage.  F/u UCx and BCx closely. HELD cellcept.    Discussed plan with caregiver Kathrin at bedside and daughter Madhavi on speaker phone      Interval History: Confusion is not improved despite ABx for UTI. Checking MRI brain, EEG.  Concern for withdrawal from home meds gabapentin and baclofen - resumed both this pm.      Around 1530, HR sustaining 150s-160s, SBO 100s, O2 sat 100% on RA.  pt asymptomatic. EKG and metop IV 5 x 1 --> returned to NSR 80s.    Discussed with dtr Madhavi via phone.  Appreciative of updates. Understands this may be one of pt's last good chances at recovery. Voices insight intro overall decline due to her brain processing differently.      Dispo: SW/CM assisting with family preference [facility]    Review of Systems   All other systems reviewed and are negative.    Objective:     Vital Signs (Most Recent):  Temp: 98.7 °F (37.1 °C) (10/11/23 0702)  Pulse: 80 (10/11/23 0849)  Resp: 18 (10/11/23 0849)  BP: 121/63 (10/11/23 0702)  SpO2: 96 % (10/11/23 0702) Vital Signs (24h Range):  Temp:  [97.3 °F (36.3 °C)-99 °F (37.2 °C)] 98.7 °F (37.1 °C)  Pulse:  [] 80  Resp:  [16-18] 18  SpO2:  [96 %-98 %] 96 %  BP: (115-154)/(57-72) 121/63     Weight: 72 kg (158 lb 11.7 oz)  Body mass index is 27.23 kg/m².    Intake/Output  Summary (Last 24 hours) at 10/11/2023 0936  Last data filed at 10/10/2023 1636  Gross per 24 hour   Intake 222 ml   Output 351 ml   Net -129 ml         Physical Exam  Vitals reviewed.   Constitutional:       Appearance: She is well-developed.   HENT:      Head: Normocephalic and atraumatic.   Eyes:      Conjunctiva/sclera: Conjunctivae normal.      Pupils: Pupils are equal, round, and reactive to light.   Neck:      Thyroid: No thyromegaly.      Vascular: No JVD.   Cardiovascular:      Rate and Rhythm: Normal rate and regular rhythm.      Heart sounds: Normal heart sounds. No murmur heard.     No friction rub. No gallop.   Pulmonary:      Effort: Pulmonary effort is normal.      Breath sounds: Normal breath sounds. No wheezing or rales.   Abdominal:      General: Bowel sounds are normal. There is no distension.      Palpations: Abdomen is soft.      Tenderness: There is no abdominal tenderness. There is no guarding or rebound.   Musculoskeletal:         General: No tenderness. Normal range of motion.      Cervical back: Neck supple.   Skin:     General: Skin is warm and dry.   Neurological:      Mental Status: She is alert. She is disoriented.      Cranial Nerves: No cranial nerve deficit.      Comments: Hyperactive, picking at bedsheets, poor attention   Psychiatric:         Behavior: Behavior normal.             Significant Labs: All pertinent labs within the past 24 hours have been reviewed.  Blood Culture x 2: ngtd   BMP:   Recent Labs   Lab 10/11/23  0822 10/11/23  1354   GLU  --  88   NA  --  132*   K  --  4.1   CL  --  105   CO2  --  20*   BUN  --  19   CREATININE  --  1.4   CALCIUM  --  8.2*   MG 1.9  --      CBC:   Recent Labs   Lab 10/10/23  0530 10/11/23  1030   WBC 2.95* 7.58   HGB 9.1* 9.1*   HCT 29.4* 29.4*   * 152     CMP:   Recent Labs   Lab 10/10/23  0530 10/11/23  1354   * 132*   K 5.0 4.1    105   CO2 18* 20*   GLU 88 88   BUN 21 19   CREATININE 1.6* 1.4   CALCIUM 7.9* 8.2*  "  PROT  --  5.5*   ALBUMIN  --  2.8*   BILITOT  --  0.8   ALKPHOS  --  59   AST  --  18   ALT  --  9*   ANIONGAP 10 7*     Magnesium:   Recent Labs   Lab 10/10/23  0530 10/11/23  0822   MG 1.8 1.9     Urine Culture:   Recent Labs   Lab 10/10/23  0458   LABURIN No significant growth       Significant Imaging: I have reviewed all pertinent imaging results/findings within the past 24 hours.      Assessment/Plan:      * UTI (urinary tract infection)  Sepsis 2/2 UTI, with organ dysfunction - KERMIT on CKD and acute encephalopathy. UA with >100 WBCs.  Interestingly, UCx is negative.  Given her frequent UTIs and possible yield impacted by ABx given prior to admit, continue CTX IV while inpatient to complete 5-7 days total of ABx.  BCx negative  - CTX IV  - STOP Vanc IV      Sepsis  This patient does have evidence of infective focus  My overall impression is sepsis.  Source: Urinary Tract  Antibiotics given-   Antibiotics (72h ago, onward)    Start     Stop Route Frequency Ordered    10/10/23 1000  cefTRIAXone (ROCEPHIN) 1 g in dextrose 5 % in water (D5W) 100 mL IVPB (MB+)         -- IV Every 24 hours (non-standard times) 10/10/23 0855    10/09/23 1845  sulfamethoxazole-trimethoprim 800-160mg per tablet 1 tablet         -- Oral Every Mon, Wed, Fri 10/09/23 1840        Latest lactate reviewed-  No results for input(s): "LACTATE" in the last 72 hours.  Organ dysfunction indicated by Acute kidney injury and Encephalopathy    Fluid challenge: s/p IVFs in ED    Post- resuscitation assessment No - Post resuscitation assessment not needed       Will Not start Pressors- Levophed for MAP of 65  Source control achieved by: ABx    See above    Encephalopathy acute  Tx underlying sepsis/UTI  - monitor closely  - minimize CNS-active meds  - no improvement on ABx  - MRI brain to eval smaller stroke (CT head negative in ED)  - EEG eval seizure  - for possible med withdrawal, resumed gabapentin and baclofen    Generalized weakness  DDx " sepsis/UTI, vol depletion, possible adrenal insufficiency vs crisis albeit only taking low dose prednisone (10mg) at home.  Similar presentation to prior, per Dr Pena admitting MD and per chart review;  Associated w/ worsening confusion and decreased po intake  - Tx Sepsis/UTI as above  - fall precautions  - pt/ot  - for possible adrenal crisis, stress dose steroids- solucortef x q1 then pred 40mg PO qd x 2 d, then moderate taper to 30 mg PO qd x 2 days then 20mg PO x 2d, then 10mg daily      Rheumatoid arthritis of multiple sites  Home meds: mycophenolate and hydroxychloroquine for ILD and connective tissue manifestatons of RA  Stress dose steroids (eventually get down to home prednisone 10 mg)  Home plaquenil   HOLD Home Cellcept in setting of infection, resume asap      Vascular Dementia  Home Seroquel qhs p.r.n.  Can consider Depakote if needed      Anemia of renal disease  Stable  monitor      Acute kidney injury superimposed on chronic kidney disease  Due to vol depletion and sepsis- Resolving  Chronic medical disease noted on renal ultrasound on last admission   Baseline seems to be under 2, admit creatinine at 2.4, improved to 1.6 the next day  Limited IV fluid infusion through the night into a.m., trend renal function  Home Bactrim initially held for KERMIT, resumed 10/10 qMWF for PJP prophylaxis in steroid dose >20mg    Chronic obstructive pulmonary disease  No active issues      Heart failure with preserved ejection fraction  Chronic diastolic CHF. No signs of overload. 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 We will monitor sodium Daily. The hyponatremia is due to  Dehydration/hypovolemia. We will treat the hyponatremia with IVFs and Tx of underlying conditions. The patient's sodium results have been reviewed and are listed below.  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        Anticoagulant long-term use        Spinal stenosis        Mood disorder        Mixed hyperlipidemia  - home statin      Post-traumatic osteoarthritis of both hips and shoulders  Home meds: gabapentin 100 qbreakfast and dinner, and 300 qhs (helps with sleep); baclofen 5mg qlunch, dinner, and hs  - resumed meds tonight 10/11 since KERMIT resolved at streamlined regimen   - gabapentin 100 TID, up-titrate as warranted  - baclofen 5 TID, adjust as warranted (caution for toxicity/overdose)      Thrombocytopenia  Resolved 10/11  monitor      AF (paroxysmal atrial fibrillation)  Stable rate   Continue home Eliquis  - episode of SVT on 10/11  - consider toprol 12.5 PO daily    Vascular dementia  - home statin  - not on antiplatelet agents at home, will confirm again with family      Long QT interval  Tele  Keep lytes replaced      PUD (peptic ulcer disease)  Home PPI        VTE Risk Mitigation (From admission, onward)         Ordered     IP VTE HIGH RISK PATIENT  Once         10/10/23 0221     Place sequential compression device  Until discontinued         10/10/23 0221     apixaban tablet 2.5 mg  2 times daily         10/09/23 1651     Reason for No Pharmacological VTE Prophylaxis  Once        Question:  Reasons:  Answer:  Already adequately anticoagulated on oral Anticoagulants    10/09/23 1648                Discharge Planning   LETICIA: 10/17/2023     Code Status: Full Code   Is the patient medically ready for discharge?:     Reason for patient still in hospital (select all that apply): Patient trending condition, Treatment, Imaging and Pending  disposition  Discharge Plan A: Skilled Nursing Facility -   Discharge Delays: None known at this time              Laura Puente MD  Department of Hospital Medicine   [facility] - Neurosurgery ([facility])

## 2023-10-13 NOTE — PLAN OF CARE
Problem: Fall Injury Risk  Goal: Absence of Fall and Fall-Related Injury  Outcome: Ongoing, Progressing  Intervention: Identify and Manage Contributors  Flowsheets (Taken 10/13/2023 7252)  Medication Review/Management: medications reviewed

## 2023-10-13 NOTE — PLAN OF CARE
POC reviewed, goals remain appropriate  Problem: Occupational Therapy  Goal: Occupational Therapy Goal  Description: Goals set on 10/10, with expiration date 11/9:  Patient will increase functional independence with ADLs by performing:    Bed mobility with Mod A  Grooming while seated EOB with Mod A  UB Dressing with Mod A.  LB Dressing with Mod A.  Toileting from bedside commode with Mod A for hygiene and clothing management.   Stand pivot transfers with Max A using AD as needed  Pt will demonstrate understanding of education provided regarding energy conservation and task modification through teach-back method.  Pt will demonstrate Nicholas in HEP for BUE strengthening GM/FM exercises to improve functional performance.       Outcome: Ongoing, Progressing

## 2023-10-13 NOTE — PT/OT/SLP PROGRESS
Physical Therapy Co-Treatment    Patient Name: Selma Lux   MRN: [id number]    Co-treatment performed for this visit due to patient need for two skilled therapists to ensure patient and staff safety and to accommodate for patient activity tolerance/pain management   Recommendations:     Discharge Recommendations: other (see comments)  Discharge Equipment Recommendations: none  Barriers to discharge: Increased level of assist    Assessment:     Selma Lux is a 88 y.o. female admitted with a medical diagnosis of Encephalopathy acute. She presents with the following impairments/functional limitations: weakness, impaired endurance, impaired sensation, impaired self care skills, impaired functional mobility, gait instability, impaired balance, impaired cognition, impaired coordination, decreased upper extremity function, decreased lower extremity function, decreased safety awareness, impaired cardiopulmonary response to activity. Pt with fair tolerance to therapy treatment on this day. Pt with improved participation and cognitive status as compared to previous PT session. Pt continues to require total assist for all functional mobility, but demonstrated improved initiation of righting reactions while seated at EOB. Pt with frequent attempts to maintain midline orientation without cueing from therapist. Pt would continue to benefit from skilled acute PT in order to address current deficits and progress functional mobility.     Rehab Prognosis: Fair; patient continues to benefit from acute skilled PT services to address these deficits and reach maximum level of function.  Recent Surgery: * No surgery found *      Plan:     During this hospitalization, patient to be seen 3 x/week to address the identified rehab impairments via gait training, therapeutic activities, therapeutic exercises, neuromuscular re-education and progress toward the following goals:    Plan of Care Expires:  11/10/23    Subjective  "    Chief Complaint: None verbalized  Patient/Family Comments/Goals: "I'm really tired."  Pain/Comfort:  Pain Rating 1: 0/10    Objective:     Communicated with RN prior to session. Patient found HOB elevated with bed alarm, telemetry, PureWick, peripheral IV, SCD upon PT entry to room.     General Precautions: Standard, aspiration, fall  Orthopedic Precautions: N/A  Braces: N/A    Functional Mobility:  Bed Mobility:    Rolling Left:  total assistance  Supine to Sit: total assistance  Sit to Supine: total assistance  Verbal and tactile cueing provided for technique, sequencing, and use of bed rail to aid in bed mobility  Transfers:   Deferred 2/2 pt safety and poor static postural stability  Balance:   Static Sitting: Poor, able to maintain 8 minutes with total assistance  Verbal cueing provided for upright posture, increased head extension, improved use of RUE to support midline orientation, reduction of R lateral lean, and command following  Dynamic Sitting: Poor, total assistance  Pt completed x10 anterior trunk leans with B HHA and minimal assist in order to encourage improved anterior weight shift in sitting and increased core activation    AM-PAC 6 CLICK MOBILITY  Turning over in bed (including adjusting bedclothes, sheets and blankets)?: 2  Sitting down on and standing up from a chair with arms (e.g., wheelchair, bedside commode, etc.): 1  Moving from lying on back to sitting on the side of the bed?: 1  Moving to and from a bed to a chair (including a wheelchair)?: 1  Need to walk in hospital room?: 1  Climbing 3-5 steps with a railing?: 1  Basic Mobility Total Score: 7     Therapeutic Activities and Exercises:  Patient educated on role of acute care PT and PT POC, safety while in hospital including calling nurse for mobility, and call light usage  Answered all questions within PT scope of practice and addressed functional mobility concerns.  Pt oriented to person, place, time, and situation.    Patient left " HOB elevated with all lines intact, call button in reach, RN notified, bed alarm on, and caregiver present.    GOALS:   Multidisciplinary Problems       Physical Therapy Goals          Problem: Physical Therapy    Goal Priority Disciplines Outcome Goal Variances Interventions   Physical Therapy Goal     PT, PT/OT Ongoing, Progressing     Description: Goals to be met by: 11/10/2023     Patient will increase functional independence with mobility by performin. Supine to sit with MInimal Assistance  2. Sit to supine with MInimal Assistance  3. Sit to stand transfer with Minimal Assistance  4. Bed to chair transfer with Moderate Assistance using LRAD  5. Gait  x 15 feet with Moderate Assistance using LRAD   6. Sitting at edge of bed x10 minutes with Supervision  7. Lower extremity exercise program x15 reps per handout, with assistance as needed                         Time Tracking:     PT Received On: 10/13/23  PT Start Time: 1308     PT Stop Time: 1326  PT Total Time (min): 18 min     Billable Minutes: Therapeutic Activity 18      Treatment Type: Treatment  PT/PTA: PT     Number of PTA visits since last PT visit: 0     10/13/2023

## 2023-10-13 NOTE — ASSESSMENT & PLAN NOTE
Tx underlying sepsis/UTI  - monitor closely  - minimize CNS-active meds  - no improvement on ABx  - EEG negative for seizure but showing moderate to severe diffuse slowing  - MRI brain to eval smaller stroke (CT head negative in ED)  - for possible med withdrawal, resumed gabapentin and baclofen

## 2023-10-13 NOTE — SUBJECTIVE & OBJECTIVE
Interval History: Pt appears pleasantly demented. She  responds to questions with mostly appropriate, short answers. She remains oriented to self only. MRI ordered but pt declined this afternoon. Will try again on 10/14/23.  Pt has been accepted to [facility] and can be transferred on 10/16/23.    Review of Systems   Constitutional:  Negative for fatigue and fever.   HENT:  Negative for congestion.    Respiratory:  Negative for cough and chest tightness.    Cardiovascular:  Negative for chest pain.   Gastrointestinal:  Negative for abdominal pain and nausea.   Musculoskeletal:  Negative for myalgias.   Neurological:  Negative for headaches.   Psychiatric/Behavioral:  Negative for behavioral problems.      Objective:     Vital Signs (Most Recent):  Temp: 97.6 °F (36.4 °C) (10/13/23 1525)  Pulse: 78 (10/13/23 1525)  Resp: 16 (10/13/23 1525)  BP: 113/67 (10/13/23 1525)  SpO2: 98 % (10/13/23 1525) Vital Signs (24h Range):  Temp:  [97.4 °F (36.3 °C)-98.6 °F (37 °C)] 97.6 °F (36.4 °C)  Pulse:  [77-86] 78  Resp:  [16-19] 16  SpO2:  [96 %-98 %] 98 %  BP: (112-144)/(57-83) 113/67     Weight: 72 kg (158 lb 11.7 oz)  Body mass index is 27.23 kg/m².    Intake/Output Summary (Last 24 hours) at 10/13/2023 1819  Last data filed at 10/13/2023 0623  Gross per 24 hour   Intake no documentation   Output 1200 ml   Net -1200 ml         Physical Exam  Vitals and nursing note reviewed.   Constitutional:       Appearance: She is well-developed.   HENT:      Head: Normocephalic and atraumatic.      Nose: Nose normal. No congestion.   Eyes:      General: No scleral icterus.        Right eye: No discharge.         Left eye: No discharge.      Extraocular Movements: Extraocular movements intact.      Conjunctiva/sclera: Conjunctivae normal.   Neck:      Thyroid: No thyromegaly.      Vascular: No JVD.   Cardiovascular:      Rate and Rhythm: Normal rate and regular rhythm.      Heart sounds: Normal heart sounds. No murmur  heard.     No friction rub. No gallop.   Pulmonary:      Effort: Pulmonary effort is normal. No respiratory distress.      Breath sounds: Normal breath sounds. No wheezing or rales.   Abdominal:      General: Bowel sounds are normal. There is no distension.      Palpations: Abdomen is soft.      Tenderness: There is no abdominal tenderness. There is no guarding or rebound.   Musculoskeletal:         General: No swelling or tenderness.      Cervical back: Neck supple.   Skin:     General: Skin is warm and dry.   Neurological:      Mental Status: She is alert. She is disoriented.      Cranial Nerves: No cranial nerve deficit.   Psychiatric:         Behavior: Behavior normal.             Significant Labs: All pertinent labs within the past 24 hours have been reviewed.  CBC:   Recent Labs   Lab 10/12/23  0846 10/13/23  0519   WBC 6.42 6.49   HGB 11.0* 11.0*   HCT 35.5* 36.7*    154     CMP:   Recent Labs   Lab 10/12/23  0846 10/13/23  0519   * 128*   K 4.1 4.6    101   CO2 18* 18*   GLU 69* 76   BUN 14 11   CREATININE 1.0 0.8   CALCIUM 8.5* 8.5*   ANIONGAP 9 9       Significant Imaging: I have reviewed all pertinent imaging results/findings within the past 24 hours.

## 2023-10-13 NOTE — PT/OT/SLP PROGRESS
Speech Language Pathology Treatment/  Discharge Summary     Patient Name:  Selma Lux   MRN:  [id number]  Admitting Diagnosis: Encephalopathy acute    Recommendations:                 General Recommendations:  Follow-up not indicated  Diet recommendations:  Regular Diet - IDDSI Level 7, Liquid Diet Level: Thin liquids - IDDSI Level 0   Aspiration Precautions: 1 bite/sip at a time, Alternating bites/sips, Assistance with meals, Feed only when awake/alert, Frequent oral care, HOB to 90 degrees, Small bites/sips, and Standard aspiration precautions   General Precautions: Standard, fall, aspiration  Communication strategies:  provide increased time to answer and go to room if call light pushed    Assessment:     Selma Lux is a 88 y.o. female with an SLP diagnosis of  functional swallow .  No further acute ST needs.     Subjective     Spoke with RN prior to session. Pt resting in bed upon entry to room, roused with verbal and tactile cues and agreeable to PO trials. Caregiver at bedside.     Pain/Comfort:  Pain Rating 1: 0/10  Pain Rating Post-Intervention 1: 0/10    Respiratory Status: Room air    Objective:     Has the patient been evaluated by SLP for swallowing?   Yes  Keep patient NPO? No     Pt seen bedside for diet check. HOB raised prior to PO trials. With prompting pt able to self present liquids from straw with [place] assist and solids independently. She demonstrated adequate mastication, transit and oral clearance with cyclic ingestion. No overt s/sx of airway compromise appreciated. Recommend she continue her current diet of regular solids and thi liquids with feed assist and medication pass as tolerated. Education provided re: role of SLP, diet recs, swallow precs, s/s aspiration and POC.  Caregiver verbalized understanding and agreement.     Goals:   Multidisciplinary Problems       SLP Goals          Problem: SLP    Goal Priority Disciplines Outcome   SLP Goal     SLP    Description: Goals  to be met 10/17  1 pt will tolerate regular diet and thin liquids.                      Plan:     Patient to be seen:  3 x/week   Plan of Care expires:     Plan of Care reviewed with:  patient, caregiver   SLP Follow-Up:  No       Discharge recommendations:  nursing facility, skilled   Barriers to Discharge:  None    Time Tracking:     SLP Treatment Date:   10/13/23  Speech Start Time:  1132  Speech Stop Time:  1138     Speech Total Time (min):  6 min    Billable Minutes: Treatment Swallowing Dysfunction 6    10/13/2023

## 2023-10-13 NOTE — PLAN OF CARE
"  Problem: Attention and Thought Clarity Impairment (Delirium)  Goal: Improved Attention and Thought Clarity  Outcome: Ongoing, Progressing  Intervention: Maximize Cognitive Function  Flowsheets (Taken 10/13/2023 0605)  Reorientation Measures:   clock in view   reorientation provided  Sensory Stimulation Regulation:   auditory stimulation minimized   care clustered   lighting decreased   quiet environment promoted     Problem: Adult Inpatient Plan of Care  Goal: Optimal Comfort and Wellbeing  Outcome: Ongoing, Progressing  Intervention: Monitor Pain and Promote Comfort  10/13/2023 0626 by Iveth Ndiaye RN  Flowsheets (Taken 10/13/2023 0626)  Pain Management Interventions:   care clustered   pillow support provided   pain management plan reviewed with patient/caregiver   position adjusted  10/13/2023 0605 by Iveth Ndiaye RN  Flowsheets (Taken 10/13/2023 0605)  Pain Management Interventions: care clustered  Intervention: Provide Person-Centered Care  Flowsheets (Taken 10/13/2023 0626)  Trust Relationship/Rapport:   care explained   choices provided   emotional support provided   empathic listening provided   questions answered   questions encouraged   reassurance provided   thoughts/feelings acknowledged   Pt aox1-2 through the night, she slept comfortably throughout the night. Pt had KUB done to clear her for  MRI brain that was pending. Was able to go down to MRI, but  while down there she  refused the scan "until she has breakfast." Offered pt  food upon arrival back to NPU, she refused and wanted breakfast.   "

## 2023-10-13 NOTE — PT/OT/SLP PROGRESS
Occupational Therapy   Co-Treatment with PT    Name: Selma Lux  MRN: [id number]  Admitting Diagnosis:  Encephalopathy acute     Patient required co-tx with PT secondary to need for multiple set of skilled hands to provide safest therapy and best outcomes.      Recommendations:     Discharge Recommendations: other (see comments)  Discharge Equipment Recommendations:  none  Barriers to discharge:  Other (Comment) (increased skilled assistance required)    Assessment:     Selma Lux is a 88 y.o. female with a medical diagnosis of Encephalopathy acute.  She presents with the following performance deficits affecting function: weakness, gait instability, decreased upper extremity function, decreased ROM, impaired sensation, impaired endurance, impaired balance, decreased lower extremity function, impaired coordination, decreased safety awareness, impaired cognition, impaired self care skills, impaired functional mobility, decreased coordination, impaired cardiopulmonary response to activity, impaired fine motor. Pt found with HOB elevated, agreeable to OT. Pt with fair tolerance to OT session, and improved participation/cognitive status compared to previous OT/PT session. Pt continues to require total assistance for functional mobility, but demonstrated improvement in righting reactions and proprioception while seated EOB. Pt with R lean, but attempting to correct independently without need for therapist cueing. Pt would continue to benefit from skilled acute OT to address functional deficits and decrease caregiver burden.    Rehab Prognosis:  Fair; patient would benefit from acute skilled OT services to address these deficits and reach maximum level of function.       Plan:     Patient to be seen 3 x/week to address the above listed problems via self-care/home management, therapeutic activities, therapeutic exercises, neuromuscular re-education, cognitive retraining  Plan of Care Expires: 11/09/23  Plan  "of Care Reviewed with: patient, caregiver    Subjective     Chief Complaint: none verbalized  Patient/Family Comments/goals: "I'm working hard"  Pain/Comfort:  Pain Rating 1: 0/10  Pain Rating Post-Intervention 1: 0/10    Objective:     Communicated with: RN prior to session.  Patient found HOB elevated with bed alarm, telemetry, SCD, PureWick, peripheral IV upon OT entry to room.    General Precautions: Standard, aspiration, fall    Orthopedic Precautions:N/A  Braces: N/A  Respiratory Status: Room air     Occupational Performance:     Bed Mobility:    Patient completed Rolling/Turning to Left with  total assistance  Patient completed Scooting/Bridging with total assistance and 2 persons  Patient completed Supine to Sit with total assistance and 2 persons  Patient completed Sit to Supine with total assistance and 2 persons     Functional Mobility/Transfers:  Deferred on this date 2/2 patient required total assistance for sitting balance    Activities of Daily Living:  Grooming: stand by assistance to apply lip balm  Lower Body Dressing: total assistance to don socks      AMPA 6 Click ADL: 10    Treatment & Education:  Pt participated in modified situps while seated EOB with Mod-Max, HHA to promote core activation for improvement in sitting balance. Pt required cueing for activation of core.  Pt educated on the following:  - role of OT and OT POC  - use of call light to request for assistance with all functional mobility to ensure safety during hospital stay  - importance of continued mobilization  - benefits of continued participation in therapy.   - Pt educated on importance of calling for staff assist for functional mobility/transfers.  - All pt questions within OT scope of practice addressed, pt verbalized understanding.      Patient left HOB elevated with all lines intact, call button in reach, bed alarm on, RN notified, and caregiver present    GOALS:   Multidisciplinary Problems       Occupational Therapy " Goals          Problem: Occupational Therapy    Goal Priority Disciplines Outcome Interventions   Occupational Therapy Goal     OT, PT/OT Ongoing, Progressing    Description: Goals set on 10/10, with expiration date 11/9:  Patient will increase functional independence with ADLs by performing:    Bed mobility with Mod A  Grooming while seated EOB with Mod A  UB Dressing with Mod A.  LB Dressing with Mod A.  Toileting from bedside commode with Mod A for hygiene and clothing management.   Stand pivot transfers with Max A using AD as needed  Pt will demonstrate understanding of education provided regarding energy conservation and task modification through teach-back method.  Pt will demonstrate [place] in HEP for BUE strengthening GM/FM exercises to improve functional performance.                            Time Tracking:     OT Date of Treatment: 10/13/23  OT Start Time: 1308  OT Stop Time: 1326  OT Total Time (min): 18 min    Billable Minutes:Neuromuscular Re-education 18    OT/GENE: OT          10/13/2023

## 2023-10-13 NOTE — PROGRESS NOTES
[facility] - Neurosurgery ([facility])  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  Patient is presenting from [facility], was recently discharged from hospital stay for similar picture about a month ago.  At that time no definitive infectious etiology was found, and it was concluded the patient was possibly experiencing and adrenal crisis and/or elderly delirium/dementia with a mild KERMIT at the time.  Daughter Rosy was at the bedside now reports that she started having decreased p.o. intake over the past few days, the SNF acility had obtained a UA that was suspicious for infection and sent it off but lost the specimen.  Patient was dosed with Cipro for about 3 days with no sustained improvement in her mental status/energy level/participation/p.o. intake. Dtr reports patient only complained of fatigue.  No chest pain or shortness a breath noted.    In ED: Hypotensive 90/40, responded to IVFs.  Creatinine bump is noted at 2.4 whereas baseline is under 2.  Patient has been refusing UA catheterizations and we are still pending collection.  EKG per my personal review shows no acute ischemic findings.  Chest x-ray also does not show any convincing findings for definitive pneumonia.          Overview/Hospital Course:  HypoT initially  with BP 90/40 in ED, given IVFs and stress dose steroids, improved to SBP 150s max overnight, but back down to SBP 110s the next am.  Pt initially declined straight cath for UA while in the ED, hence a delay in obtaining UA - obtained around 0500 the next day after admit, revealing for a UTI with >100 WBCs, 20 RBCs, good sample (only 1 sq epi).  Ongoing tachycardia (90s). New leukopenia (3k), KERMIT resolving, Cr 1.6 (from 2.4)    Dx sepsis 2/2 UTI, with KERMIT and acute encephalopathy, in immunocompromised pt on cellcept/plaquenil for RA.  Started CTX/Vanc STAT.  CTX for g negative coverage (h/o E coli UTI with MDR), Vanc for empiric Enterococcus coverage.  F/u UCx and BCx closely. HELD cellcept.   Discussed plan with caregiver Kathrin at bedside and daughter Madhavi on speaker phone.    BCx NGTD x5, UCx without growth, vancomycin discontinued. Mentation still not at baseline. EEG showing moderate to severe diffuse background slowing consistent with diffuse cerebral dysfunction and encephalopathy which may be on the basis of toxic, metabolic, or primary neuronal disorder. No gross abnormalities noted on metabolic profile. Follow up MRI ordered. Pt stated that she did not feel like going for the MRI on the morning or 10/13/23 because she had not eaten breakfast. Will attempt again on 10/14/23.       Interval History: Pt appears pleasantly demented. She  responds to questions with mostly appropriate, short answers. She remains oriented to self only. MRI ordered but pt declined this afternoon. Will try again on 10/14/23.  Pt has been accepted to [facility] and can be transferred on 10/16/23.    Review of Systems   Constitutional:  Negative for fatigue and fever.   HENT:  Negative for congestion.    Respiratory:  Negative for cough and chest tightness.    Cardiovascular:  Negative for chest pain.   Gastrointestinal:  Negative for abdominal pain and nausea.   Musculoskeletal:  Negative for myalgias.    Neurological:  Negative for headaches.   Psychiatric/Behavioral:  Negative for behavioral problems.      Objective:     Vital Signs (Most Recent):  Temp: 97.6 °F (36.4 °C) (10/13/23 1525)  Pulse: 78 (10/13/23 1525)  Resp: 16 (10/13/23 1525)  BP: 113/67 (10/13/23 1525)  SpO2: 98 % (10/13/23 1525) Vital Signs (24h Range):  Temp:  [97.4 °F (36.3 °C)-98.6 °F (37 °C)] 97.6 °F (36.4 °C)  Pulse:  [77-86] 78  Resp:  [16-19] 16  SpO2:  [96 %-98 %] 98 %  BP: (112-144)/(57-83) 113/67     Weight: 72 kg (158 lb 11.7 oz)  Body mass index is 27.23 kg/m².    Intake/Output Summary (Last 24 hours) at 10/13/2023 1819  Last data filed at 10/13/2023 0623  Gross per 24 hour   Intake no documentation   Output 1200 ml   Net -1200 ml         Physical Exam  Vitals and nursing note reviewed.   Constitutional:       Appearance: She is well-developed.   HENT:      Head: Normocephalic and atraumatic.      Nose: Nose normal. No congestion.   Eyes:      General: No scleral icterus.        Right eye: No discharge.         Left eye: No discharge.      Extraocular Movements: Extraocular movements intact.      Conjunctiva/sclera: Conjunctivae normal.   Neck:      Thyroid: No thyromegaly.      Vascular: No JVD.   Cardiovascular:      Rate and Rhythm: Normal rate and regular rhythm.      Heart sounds: Normal heart sounds. No murmur heard.     No friction rub. No gallop.   Pulmonary:      Effort: Pulmonary effort is normal. No respiratory distress.      Breath sounds: Normal breath sounds. No wheezing or rales.   Abdominal:      General: Bowel sounds are normal. There is no distension.      Palpations: Abdomen is soft.      Tenderness: There is no abdominal tenderness. There is no guarding or rebound.   Musculoskeletal:         General: No swelling or tenderness.      Cervical back: Neck supple.   Skin:     General: Skin is warm and dry.   Neurological:      Mental Status: She is alert. She is disoriented.      Cranial Nerves: No cranial nerve deficit.  "  Psychiatric:         Behavior: Behavior normal.             Significant Labs: All pertinent labs within the past 24 hours have been reviewed.  CBC:   Recent Labs   Lab 10/12/23  0846 10/13/23  0519   WBC 6.42 6.49   HGB 11.0* 11.0*   HCT 35.5* 36.7*    154     CMP:   Recent Labs   Lab 10/12/23  0846 10/13/23  0519   * 128*   K 4.1 4.6    101   CO2 18* 18*   GLU 69* 76   BUN 14 11   CREATININE 1.0 0.8   CALCIUM 8.5* 8.5*   ANIONGAP 9 9       Significant Imaging: I have reviewed all pertinent imaging results/findings within the past 24 hours.      Assessment/Plan:      * Encephalopathy acute  Tx underlying sepsis/UTI  - monitor closely  - minimize CNS-active meds  - no improvement on ABx  - EEG negative for seizure but showing moderate to severe diffuse slowing  - MRI brain to eval smaller stroke (CT head negative in ED)  - for possible med withdrawal, resumed gabapentin and baclofen    Sepsis  This patient does have evidence of infective focus  My overall impression is sepsis.  Source: Urinary Tract  Antibiotics given-   Antibiotics (72h ago, onward)    Start     Stop Route Frequency Ordered    10/10/23 1000  cefTRIAXone (ROCEPHIN) 1 g in dextrose 5 % in water (D5W) 100 mL IVPB (MB+)         -- IV Every 24 hours (non-standard times) 10/10/23 0855    10/09/23 1845  sulfamethoxazole-trimethoprim 800-160mg per tablet 1 tablet         -- Oral Every Mon, Wed, Fri 10/09/23 1840        Latest lactate reviewed-  No results for input(s): "LACTATE" in the last 72 hours.  Organ dysfunction indicated by Acute kidney injury and Encephalopathy    Fluid challenge: s/p IVFs in ED    Post- resuscitation assessment No - Post resuscitation assessment not needed       Will Not start Pressors- Levophed for MAP of 65  Source control achieved by: ABx    See above    Anemia of renal disease  Stable  monitor      Acute kidney injury superimposed on chronic kidney disease  Due to vol depletion and sepsis- " Resolved  Chronic medical disease noted on renal ultrasound on last admission   Admit creatinine at 2.4  Home Bactrim initially held for KERMIT, resumed 10/10 qMWF for PJP prophylaxis in steroid dose >20mg    Chronic obstructive pulmonary disease  No active issues      Heart failure with preserved ejection fraction  Chronic diastolic CHF. No signs of overload. Volume depletion on presentation to ED, resuscitated with IVFs  - strict Is/Os   - daily weights, preferably standing   - tele  - keep Mg >2, Phos >3, K >4      Rheumatic torticollis        Vascular Dementia  Home Seroquel qhs p.r.n.  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        Anticoagulant long-term use        Spinal stenosis        Mood disorder        Mixed hyperlipidemia  - home statin      Post-traumatic osteoarthritis of both hips and shoulders  Home meds: gabapentin 100 qbreakfast and dinner, and 300 qhs (helps with sleep); baclofen 5mg qlunch, dinner, and hs  - resumed meds tonight 10/11 since KERMIT resolved at streamlined regimen   - gabapentin 100 TID, up-titrate as warranted  - baclofen 5 TID, adjust as warranted (caution for toxicity/overdose)      UTI  (urinary tract infection)  Sepsis 2/2 UTI, with organ dysfunction - KERMIT on CKD and acute encephalopathy. UA with >100 WBCs.  Interestingly, UCx is negative.  Given her frequent UTIs and possible yield impacted by ABx given prior to admit, continue CTX IV while inpatient to complete 5-7 days total of ABx.  BCx negative  - CTX IV  - STOP Vanc IV      Thrombocytopenia  Resolved 10/11  monitor      AF (paroxysmal atrial fibrillation)  Stable rate   Continue home Eliquis  - episode of SVT on 10/11  - consider toprol 12.5 PO daily    Vascular dementia  - home statin  - not on antiplatelet agents at home, will confirm again with family      Long QT interval  Tele  Keep lytes replaced      Rheumatoid arthritis of multiple sites  Home meds: mycophenolate and hydroxychloroquine for ILD and connective tissue manifestatons of RA  Stress dose steroids (eventually get down to home prednisone 10 mg)  Home plaquenil   HOLD Home Cellcept in setting of infection, resume asap      PUD (peptic ulcer disease)  Home PPI        VTE Risk Mitigation (From admission, onward)         Ordered     IP VTE HIGH RISK PATIENT  Once         10/10/23 0221     Place sequential compression device  Until discontinued         10/10/23 0221     apixaban tablet 2.5 mg  2 times daily         10/09/23 1651     Reason for No Pharmacological VTE Prophylaxis  Once        Question:  Reasons:  Answer:  Already adequately anticoagulated on oral Anticoagulants    10/09/23 1648                Discharge Planning   LETICIA: 10/16/2023     Code Status: Full Code   Is the patient medically ready for discharge?: (No Documentation)    Reason for patient still in hospital (select all that apply): Treatment  Discharge Plan A: Skilled Nursing Facility   Discharge Delays: None known at this time        Branden Dawkins MD  Department of Hospital Medicine   [facility] - Neurosurgery ([facility])

## 2023-10-13 NOTE — PLAN OF CARE
Updated progress note, pt/ot note and plan of care notes reflecting delirium but no behavioral issues sent to [facility] via [employer].    3:30 [facility] has advised they have clinically accepted.  [facility] not able to admit today as their financial person has gone for the day.  Adelaida states she will work for Monday admit. Adelaida will call family for paperwork.

## 2023-10-14 PROBLEM — I63.9 OCCIPITAL CEREBRAL INFARCTION: Status: ACTIVE | Noted: 2023-01-01

## 2023-10-14 PROBLEM — I63.531 CEREBROVASCULAR ACCIDENT (CVA) DUE TO STENOSIS OF RIGHT POSTERIOR CEREBRAL ARTERY: Status: ACTIVE | Noted: 2023-01-01

## 2023-10-14 NOTE — SIGNIFICANT EVENT
MRI with acute R occipital infarct. Patient examined and appears to remain confused but stable. Vascular neurology consulted. Reports not likely cause of her AMS but recs to continue OAC and statin at this time. Repeat TTE. Family at bedside updated

## 2023-10-14 NOTE — HPI
89 y/o female who was admitted to [facility] on 10-9-13 to hospital Medicine for AMS. Patient is being treated for UTI. On 10-13-23 still with some confusion so MRI Brain ordered and reveals a very small Right occipital infarct.   Patient also with history of vascular dementia.   Patient with left sided residual deficits from prior strokes.   Prior strokes on 7-12-22 L parietal infarct, 6-19-22 R corona radiata infarct and 12-19-21 Right SDH.    Patient is already on Eliquis for afib, and Lipitor 40 mg daily.   Would recommend Neuro checks Q4H, therapy PT/OT already ordered needs ST and 2 d echo

## 2023-10-14 NOTE — CONSULTS
Franc[facility]ons - Neurosurgery ([facility])  Vascular Neurology  Comprehensive Stroke Center  Consult Note    Inpatient consult to Vascular (Stroke) Neurology  Consult performed by: Aliyah Tabares NP  Consult ordered by: Nathaniel Rubio MD  Reason for consult: Right occipital infarct        Assessment/Plan:     Patient is a 88 y.o. 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 The following therapy evaluations are needed: PT evaluate and treat, OT evaluate and treat, SLP evaluate and treat    Diagnostics ordered/pending: TTE to assess cardiac function/status     VTE prophylaxis: Mechanical prophylaxis: Place SCDs  None: Reason for No Pharmacological VTE Prophylaxis: Currently on anticoagulation    BP parameters: Infarct: No intervention, SBP <220        Mixed hyperlipidemia  Stroke risk factor  Lipitor 40 mg daily        STROKE DOCUMENTATION          NIH Scale:  1a. Level of Consciousness: 0-->Alert, keenly responsive  1b. LOC Questions: 2-->Answers neither question correctly  1c. LOC Commands: 2-->Performs neither task correctly  2. Best Gaze: 0-->Normal  3. Visual:  0-->No visual loss  4. Facial Palsy: 0-->Normal symmetrical movements  5a. Motor Arm, Left: 2-->Some effort against gravity, limb cannot get to or maintain (if cued) 90 (or 45) degrees, drifts down to bed, but has some effort against gravity (old deficit)  5b. Motor Arm, Right: 0-->No drift, limb holds 90 (or 45) degrees for full 10 secs  6a. Motor Leg, Left: 2-->Some effort against gravity, leg falls to bed by 5 secs, but has some effort against gravity (old deficit)  6b. Motor Leg, Right: 0-->No drift, leg holds 30 degree position for full 5 secs  7. Limb Ataxia: 0-->Absent  8. Sensory: 0-->Normal, no sensory loss  9. Best Language: 0-->No aphasia, normal  10. Dysarthria: 0-->Normal  11. Extinction and Inattention (formerly Neglect): 0-->No abnormality  Total (NIH Stroke Scale): 8    Modified [place] Score: 3 (uses wheelchair)  Marion Coma Scale:14   ABCD2 Score:    [id number] Score:6  HAS -BLED Score:   ICH Score:   Hunt & Jain Classification:       Thrombolysis Candidate? No, Out of window - Symptom onset > 4.5 hours    Delays to Thrombolysis?  Not Applicable    Interventional Revascularization Candidate?   Is the patient eligible for mechanical endovascular reperfusion (ANALI)?  No; at this time symptoms not suggestive of large vessel occlusion and No; significant pre-stroke disability     Delays to Thrombectomy? Not Applicable    Hemorrhagic change of an Ischemic Stroke: Does this patient have an ischemic stroke with hemorrhagic changes? No     Subjective:     History of Present Illness:  89 y/o female who was admitted to [facility] on 10[facility]9-[facility] AMS. Patient is being treated for UTI. On 10-13-23 still with some confusion so MRI Brain ordered and reveals a very small Right occipital infarct.   Patient also with history of vascular dementia.   Patient with left sided residual deficits from prior strokes.   Prior strokes on 7-12-22 L parietal infarct, 6-19-22 R corona radiata infarct and  12-19-21 Right SDH.    Patient is already on Eliquis for afib, and Lipitor 40 mg daily.   Would recommend Neuro checks Q4H, therapy PT/OT already ordered needs ST and 2 d echo            Past Medical History:   Diagnosis Date    Acute cystitis without hematuria 2/27/2020    Acute hypoxemic respiratory failure 4/11/2018    Acute respiratory failure with hypoxia 3/2/2020    KERMIT (acute kidney injury) 3/13/2019    Altered mental status     Anemia     Arthritis     Bilateral hand pain 3/14/2018    Branch retinal vein occlusion, left eye 2/20/2015    Chronic bilateral low back pain without sciatica 3/23/2017    Chronic renal failure in pediatric patient, stage 3 (moderate) 4/15/2018    Chronic renal failure syndrome, stage 3 (moderate) 4/15/2018    Chronic systolic (congestive) heart failure 8/6/2021    Last Assessment & Plan:  Formatting of this note might be different from the original. -last ANTOLIN was done on 03/01/2020:  Grade 1 mild left ventricular diastolic dysfunction    Cognitive communication deficit 12/19/2017    COPD exacerbation 1/23/2022    Cystoid macular edema, left eye 2/20/2015    Cystoid macular edema, left eye 2/20/2015    Delirium 12/30/2021    DJD (degenerative joint disease) of cervical spine 5/15/2013    Enterococcal bacteremia     Fatty liver 8/26/2014    Goiter 4/9/2018    Hashimoto's disease     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    History of 2019 novel coronavirus disease (COVID-19) 4/11/2022 2/2022    Hypertension     Hypertensive encephalopathy     IBS (irritable bowel syndrome) 6/21/2017    IGT (impaired glucose tolerance) 1/12/2016    Intracranial subdural hematoma 1/4/2022    Last Assessment & Plan: Formatting of this note might be different from the original. Hospitalization late 2021, w/ rehab to follow (no notes available) --12/13/2021-CT head revealed thin extra-axial hyperdense collection overlying the right cerebral convexity compatible with acute subdural hematoma,  neurosurgery was consulted, patient was noted with Keppra 500 mg b.i.d., apixaban was held -12/19/    Iron deficiency anemia secondary to inadequate dietary iron intake 6/24/2013    Joint pain     Keratoconjunctivitis sicca of both eyes not specified as Sjogren's 7/29/2016    Leiomyoma of uterus, unspecified 9/16/2014    Long QT interval 6/29/2016    Due to medication (plaquenil)     Macular edema 1/12/2015    Multinodular goiter 1/12/2016    Neuropathy 8/23/2017    Plaquenil causing adverse effect in therapeutic use 10/7/2016    Pneumococcal vaccine refused 8/17/2016    Pneumonia due to Streptococcus pneumoniae 4/5/2018    Poor nutrition 12/23/2018    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/2015    RA (rheumatoid arthritis) 12/13/2021    -managed by rhematology, since this is a duplicate problem list entry, will archive this     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    s/p Right Total knee replacement 5/15/2013    Scoliosis of thoracic spine 5/15/2013    Small vessel disease, cerebrovascular 12/28/2017    Stroke     Traumatic subdural hemorrhage with loss of consciousness of unspecified duration, initial encounter 1/10/2023    12/2021    UTI (urinary tract infection) 12/29/2018    Vascular dementia 12/6/2017     Past Surgical History:   Procedure Laterality Date    BREAST CYST EXCISION      CATARACT EXTRACTION      COLONOSCOPY N/A 9/29/2015    Procedure: COLONOSCOPY;  Surgeon: FIDELINA Sanchez MD;  Location: [address]);  Service: Endoscopy;  Laterality: N/A;    EYE SURGERY      INJECTION OF ANESTHETIC AGENT AROUND NERVE Left 4/19/2021    Procedure: BLOCK, NERVE, FEMORAL AND OBTURATOR;  Surgeon: Shivam Gonzalez MD;  Location: [facility]IN [facility];  Service: Pain Management;  Laterality: Left;  consent needed    JOINT REPLACEMENT      right knee    KNEE SURGERY Left 12/31/2013    TKR    left parotidectomy      mixed tumor    ME EVAL,SWALLOW FUNCTION,CINE/VIDEO RECORD   6/5/2021         SALIVARY GLAND SURGERY      TONSILLECTOMY      UPPER GASTROINTESTINAL ENDOSCOPY       Family History   Problem Relation Age of Onset    Hypertension Mother     Heart disease Mother     Hyperthyroidism Mother     Prostate cancer Father         prostate cancer    Cancer Father     Breast cancer Maternal Grandmother     Hyperthyroidism Other     Colon cancer Neg Hx      Social History     Tobacco Use    Smoking status: Never     Passive exposure: Never    Smokeless tobacco: Never   Substance Use Topics    Alcohol use: Yes     Alcohol/week: 0.0 standard drinks of alcohol     Comment: very seldom     Drug use: No     Review of patient's allergies indicates:  No Known Allergies    Medications: I have reviewed the current medication administration record.    Medications Prior to Admission   Medication Sig Dispense Refill Last Dose    apixaban (ELIQUIS) 2.5 mg Tab Take 1 tablet (2.5 mg total) by mouth 2 (two) times daily. 180 tablet 3 10/9/2023    predniSONE (DELTASONE) 10 MG tablet Take 10 mg by mouth once daily.   Past Week    albuterol-ipratropium (DUO-NEB) 2.5 mg-0.5 mg/3 mL nebulizer solution Take 3 mLs by nebulization 2 (two) times daily as needed for Shortness of Breath. Rescue 75 mL 2     atorvastatin (LIPITOR) 40 MG tablet Take 1 tablet (40 mg total) by mouth every evening. 90 tablet 3     baclofen (LIORESAL) 10 MG tablet Take 0.5 tablets (5 mg total) by mouth with lunch AND 0.5 tablets (5 mg total) daily with dinner or evening meal AND 1 tablet (10 mg total) every evening. 180 tablet 3     cholecalciferol, vitamin D3, 125 mcg (5,000 unit) capsule Take 1 capsule (5,000 Units total) by mouth once daily. 90 capsule 3     ferrous sulfate, dried (SLOW FE) 160 mg (50 mg iron) TbSR Take 1 tablet (160 mg total) by mouth every other day.       fluticasone-salmeterol diskus inhaler 100-50 mcg Inhale 1 puff into the lungs 2 (two) times daily. Controller 1 each 2     gabapentin  (NEURONTIN) 100 MG capsule Take 1 capsule (100 mg total) by mouth 2 (two) times daily. 60 capsule 6     gabapentin (NEURONTIN) 300 MG capsule Take 1 capsule (300 mg total) by mouth every evening. 30 capsule 6     gabapentin 5% baclofen 2% amitriptyline 2% topical cream Apply topically 3 (three) times daily. 240 g 2     hydrOXYchloroQUINE (PLAQUENIL) 200 mg tablet Take 1 tablet (200 mg total) by mouth 2 (two) times daily. 180 tablet 11     LIDOcaine (LIDODERM) 5 % Place 1 patch onto the skin once daily. Remove & Discard patch within 12 hours as needed for pain or as directed by MD Place patch onto lower back as needed 60 patch 11     magnesium oxide (MAG-OX) 400 mg (241.3 mg magnesium) tablet Take 400 mg by mouth once daily.       mycophenolate (CELLCEPT) 500 mg Tab Take 1 tablet (500 mg total) by mouth 2 (two) times daily. 180 tablet 3     pantoprazole (PROTONIX) 40 MG tablet Take 1 tablet (40 mg total) by mouth once daily. 30 tablet 5     QUEtiapine (SEROQUEL) 25 MG Tab Take 1 tablet (25 mg total) by mouth nightly as needed (insomnia). (Patient not taking: Reported on 8/31/2023) 30 tablet 11     sulfamethoxazole-trimethoprim 800-160mg (BACTRIM DS) 800-160 mg Tab One tablet by mouth every Monday, Wednesday, Friday to prevent lung infection 36 tablet 3     thiamine 100 MG tablet Take 100 mg by mouth once daily.          Review of Systems   Constitutional:  Negative for chills and fever.   HENT:  Negative for ear pain and facial swelling.    Eyes:  Negative for pain and redness.   Respiratory:  Negative for cough and shortness of breath.    Cardiovascular:  Negative for chest pain and leg swelling.   Gastrointestinal:  Negative for abdominal distention and abdominal pain.   Endocrine: Positive for cold intolerance and heat intolerance.   Genitourinary:  Negative for dyspareunia and dysuria.   Musculoskeletal:  Positive for arthralgias. Negative for back pain.   Skin:  Negative for rash and wound.    Allergic/Immunologic: Positive for immunocompromised state.   Neurological:  Positive for weakness. Negative for numbness.   Hematological:  Bruises/bleeds easily.   Psychiatric/Behavioral:  Positive for confusion. Negative for agitation.      Objective:     Vital Signs (Most Recent):  Temp: 98.5 °F (36.9 °C) (10/14/23 0007)  Pulse: 85 (10/14/23 0007)  Resp: 16 (10/14/23 0007)  BP: 124/60 (10/14/23 0007)  SpO2: 97 % (10/14/23 0007)    Vital Signs Range (Last 24H):  Temp:  [97.4 °F (36.3 °C)-98.5 °F (36.9 °C)]   Pulse:  [78-92]   Resp:  [16-19]   BP: (112-144)/(60-83)   SpO2:  [96 %-98 %]        Physical Exam  Vitals and nursing note reviewed.   Constitutional:       Appearance: Normal appearance. She is normal weight.   HENT:      Head: Normocephalic and atraumatic.   Eyes:      Extraocular Movements: Extraocular movements intact.      Conjunctiva/sclera: Conjunctivae normal.      Pupils: Pupils are equal, round, and reactive to light.   Cardiovascular:      Rate and Rhythm: Normal rate and regular rhythm.   Pulmonary:      Effort: Pulmonary effort is normal.      Breath sounds: Normal breath sounds.   Abdominal:      General: Abdomen is flat. Bowel sounds are normal.      Palpations: Abdomen is soft.   Musculoskeletal:         General: Normal range of motion.      Cervical back: Normal range of motion.   Skin:     General: Skin is warm and dry.   Neurological:      Mental Status: She is alert. She is disoriented.      Motor: Weakness present.              Neurological Exam:   LOC: alert  Attention Span: Good   Language: confusion at times  Articulation: No dysarthria  Orientation: Person, Place, Time   Visual Fields: Full  EOM (CN III, IV, VI): Full/intact  Pupils (CN II, III): PERRL  Facial Sensation (CN V): Normal  Facial Movement (CN VII): Symmetric facial expression    Gag Reflex: present  Reflexes: flexor plantar responses bilaterally  Motor: Arm left  Paresis: 3/5  Leg left  Paresis: 3/5  Arm right  Normal  "5/5  Leg right Normal 5/5  Cerebellum: No evidence of appendicular or axial ataxia  Sensation: Intact to light touch, temperature and vibration  Tone: Normal tone throughout      Laboratory:  CMP:   Recent Labs   Lab 10/13/23  0519   CALCIUM 8.5*   *   K 4.6   CO2 18*      BUN 11   CREATININE 0.8     CBC:   Recent Labs   Lab 10/13/23  0519   WBC 6.49   RBC 4.16   HGB 11.0*   HCT 36.7*      MCV 88   MCH 26.4*   MCHC 30.0*     Lipid Panel: No results for input(s): "CHOL", "LDLCALC", "HDL", "TRIG" in the last 168 hours.  Coagulation: No results for input(s): "PT", "INR", "APTT" in the last 168 hours.  Hgb A1C: No results for input(s): "HGBA1C" in the last 168 hours.  TSH:   Recent Labs   Lab 10/11/23  1354   TSH 1.264       Diagnostic Results:      Brain imaging:  CT head w/o contrast 10-9-23 results:  1. No evidence of acute intracranial pathology.  If there is concern for acute ischemic etiology, consider MRI brain for further evaluation.  2. Chronic microvascular ischemic change and multifocal remote infarcts.    MRI Brain w/o contrast 10-13-23 results:    Acute infarct in the right occipital lobe.  No evidence of hemorrhagic conversion.     Remote lacunar type infarcts and chronic microvascular ischemic changes.    Vessel Imaging:      Cardiac Evaluation:   EKG 10-9-23 results:  Sinus rhythm with frequent Premature ventricular complexes Low voltage QRS Prolonged QT Abnormal ECG When compared with ECG of 31-AUG-2023 15:09, Premature ventricular complexes are now Present Nonspecific T wave abnormality now evident in Anterior leads Confirmed by Jerod MICHAEL MD ([id number]) on 10/9/2023 2:06:14 PM        Aliyah Tabares NP  [facility]  Department of Vascular Neurology   [facility] Neurosurgery [facility])   "

## 2023-10-14 NOTE — PLAN OF CARE
Problem: Skin Injury Risk Increased  Goal: Skin Health and Integrity  Outcome: Ongoing, Progressing  Intervention: Optimize Skin Protection  Flowsheets (Taken 10/14/2023 1735)  Head of Bed (HOB) Positioning: HOB elevated     Problem: Fall Injury Risk  Goal: Absence of Fall and Fall-Related Injury  Outcome: Ongoing, Progressing  Intervention: Identify and Manage Contributors  Flowsheets (Taken 10/14/2023 1735)  Medication Review/Management: medications reviewed

## 2023-10-14 NOTE — ASSESSMENT & PLAN NOTE
87 y/o female with new R occipital infarct, unknown last known normal, has had multiple small vessel disease infarcts in there past    Antithrombotics: eliquis 2.5 mg BID    Statins: Lipitor 40 mg daily    Aggressive risk factor modification: HTN, HLD     Rehab efforts: The patient has been evaluated by a stroke team provider and the therapy needs have been fully considered based off the presenting complaints and exam findings. The following therapy evaluations are needed: PT evaluate and treat, OT evaluate and treat, SLP evaluate and treat    Diagnostics ordered/pending: TTE to assess cardiac function/status     VTE prophylaxis: Mechanical prophylaxis: Place SCDs  None: Reason for No Pharmacological VTE Prophylaxis: Currently on anticoagulation    BP parameters: Infarct: No intervention, SBP <220

## 2023-10-14 NOTE — ASSESSMENT & PLAN NOTE
Tx underlying sepsis/UTI  - monitor closely  - minimize CNS-active meds  - no improvement on ABx  - EEG negative for seizure but showing moderate to severe diffuse slowing  - MRI brain showing acute right occipital infarct - unlikely associated with symptoms. Neurology consult placed.   - For possible med withdrawal, resumed gabapentin and baclofen

## 2023-10-14 NOTE — ASSESSMENT & PLAN NOTE
Results for orders placed or performed during the hospital encounter of 10/09/23 (from the past 2160 hour(s))   MRI Brain Without Contrast    Impression    Acute infarct in the right occipital lobe.  No evidence of hemorrhagic conversion.    Remote lacunar type infarcts and chronic microvascular ischemic changes.    Case discussed with Dr. Laura Puente on 10/13/2023 at 21:47    Electronically signed by resident: Cristiana Buckner  Date:    10/13/2023  Time:    21:22    Electronically signed by: Flo Scott MD  Date:    10/13/2023  Time:    21:48     *Note: Due to a large number of results and/or encounters for the requested time period, some results have not been displayed. A complete set of results can be found in Results Review.     Antithrombotics for secondary stroke prevention: Anticoagulants: Apixaban 5 mg BID     Statins for secondary stroke prevention and hyperlipidemia, if present:   Statins: Atorvastatin- 80 mg daily    Aggressive risk factor modification: HTN, CAD     Rehab efforts: The patient has been evaluated by a stroke team provider and the therapy needs have been fully considered based off the presenting complaints and exam findings. The following therapy evaluations are needed: PT evaluate and treat, OT evaluate and treat, SLP evaluate and treat    Diagnostics ordered/pending: TTE to assess cardiac function/status     VTE prophylaxis: None: Reason for No Pharmacological VTE Prophylaxis: Currently on anticoagulation    BP parameters: Infarct: No intervention, SBP <220

## 2023-10-14 NOTE — SUBJECTIVE & OBJECTIVE
Past Medical History:   Diagnosis Date    Acute cystitis without hematuria 2/27/2020    Acute hypoxemic respiratory failure 4/11/2018    Acute respiratory failure with hypoxia 3/2/2020    KERMIT (acute kidney injury) 3/13/2019    Altered mental status     Anemia     Arthritis     Bilateral hand pain 3/14/2018    Branch retinal vein occlusion, left eye 2/20/2015    Chronic bilateral low back pain without sciatica 3/23/2017    Chronic renal failure in pediatric patient, stage 3 (moderate) 4/15/2018    Chronic renal failure syndrome, stage 3 (moderate) 4/15/2018    Chronic systolic (congestive) heart failure 8/6/2021    Last Assessment & Plan:  Formatting of this note might be different from the original. 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 Hospitalization late 2021, w/ rehab to follow (no notes available) --12/13/2021-CT head revealed thin extra-axial hyperdense collection overlying the right cerebral convexity compatible with acute subdural hematoma, neurosurgery was consulted, patient was noted with Keppra 500 mg b.i.d., apixaban was held -12/19/    Iron deficiency anemia secondary to inadequate dietary iron intake 6/24/2013    Joint pain      Keratoconjunctivitis sicca of both eyes not specified as Sjogren's 7/29/2016    Leiomyoma of uterus, unspecified 9/16/2014    Long QT interval 6/29/2016    Due to medication (plaquenil)     Macular edema 1/12/2015    Multinodular goiter 1/12/2016    Neuropathy 8/23/2017    Plaquenil causing adverse effect in therapeutic use 10/7/2016    Pneumococcal vaccine refused 8/17/2016    Pneumonia due to Streptococcus pneumoniae 4/5/2018    Poor nutrition 12/23/2018    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/2015    RA (rheumatoid arthritis) 12/13/2021    -managed by rhematology, since this is a duplicate problem list entry, will archive this     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     Past Surgical History:   Procedure Laterality Date    BREAST CYST EXCISION      CATARACT EXTRACTION      COLONOSCOPY N/A 9/29/2015    Procedure: COLONOSCOPY;  Surgeon: FIDELINA Sanchez MD;  [facility]  Service: Endoscopy;  Laterality: N/A;    EYE SURGERY      INJECTION OF ANESTHETIC AGENT AROUND NERVE Left 4/19/2021    Procedure: BLOCK, NERVE, FEMORAL AND OBTURATOR;  Surgeon: Shivam Gonzalez MD;  Location: [facility] PAIN [facility]GT;  Service: Pain Management;  Laterality: Left;  consent needed    JOINT REPLACEMENT      right knee    KNEE SURGERY Left 12/31/2013    TKR    left parotidectomy      mixed tumor    PA EVAL,SWALLOW FUNCTION,CINE/VIDEO RECORD  6/5/2021         SALIVARY GLAND SURGERY      TONSILLECTOMY      UPPER GASTROINTESTINAL ENDOSCOPY       Family History   Problem Relation Age of Onset    Hypertension Mother     Heart disease Mother     Hyperthyroidism Mother      Prostate cancer Father         prostate cancer    Cancer Father     Breast cancer Maternal Grandmother     Hyperthyroidism Other     Colon cancer Neg Hx      Social History     Tobacco Use    Smoking status: Never     Passive exposure: Never    Smokeless tobacco: Never   Substance Use Topics    Alcohol use: Yes     Alcohol/week: 0.0 standard drinks of alcohol     Comment: very seldom     Drug use: No     Review of patient's allergies indicates:  No Known Allergies    Medications: I have reviewed the current medication administration record.    Medications Prior to Admission   Medication Sig Dispense Refill Last Dose    apixaban (ELIQUIS) 2.5 mg Tab Take 1 tablet (2.5 mg total) by mouth 2 (two) times daily. 180 tablet 3 10/9/2023    predniSONE (DELTASONE) 10 MG tablet Take 10 mg by mouth once daily.   Past Week    albuterol-ipratropium (DUO-NEB) 2.5 mg-0.5 mg/3 mL nebulizer solution Take 3 mLs by nebulization 2 (two) times daily as needed for Shortness of Breath. Rescue 75 mL 2     atorvastatin (LIPITOR) 40 MG tablet Take 1 tablet (40 mg total) by mouth every evening. 90 tablet 3     baclofen (LIORESAL) 10 MG tablet Take 0.5 tablets (5 mg total) by mouth with lunch AND 0.5 tablets (5 mg total) daily with dinner or evening meal AND 1 tablet (10 mg total) every evening. 180 tablet 3     cholecalciferol, vitamin D3, 125 mcg (5,000 unit) capsule Take 1 capsule (5,000 Units total) by mouth once daily. 90 capsule 3     ferrous sulfate, dried (SLOW FE) 160 mg (50 mg iron) TbSR Take 1 tablet (160 mg total) by mouth every other day.       fluticasone-salmeterol diskus inhaler 100-50 mcg Inhale 1 puff into the lungs 2 (two) times daily. Controller 1 each 2     gabapentin (NEURONTIN) 100 MG capsule Take 1 capsule (100 mg total) by mouth 2 (two) times daily. 60 capsule 6     gabapentin (NEURONTIN) 300 MG capsule Take 1 capsule (300 mg total) by mouth every evening. 30 capsule 6     gabapentin 5% baclofen 2% amitriptyline 2%  topical cream Apply topically 3 (three) times daily. 240 g 2     hydrOXYchloroQUINE (PLAQUENIL) 200 mg tablet Take 1 tablet (200 mg total) by mouth 2 (two) times daily. 180 tablet 11     LIDOcaine (LIDODERM) 5 % Place 1 patch onto the skin once daily. Remove & Discard patch within 12 hours as needed for pain or as directed by MD Place patch onto lower back as needed 60 patch 11     magnesium oxide (MAG-OX) 400 mg (241.3 mg magnesium) tablet Take 400 mg by mouth once daily.       mycophenolate (CELLCEPT) 500 mg Tab Take 1 tablet (500 mg total) by mouth 2 (two) times daily. 180 tablet 3     pantoprazole (PROTONIX) 40 MG tablet Take 1 tablet (40 mg total) by mouth once daily. 30 tablet 5     QUEtiapine (SEROQUEL) 25 MG Tab Take 1 tablet (25 mg total) by mouth nightly as needed (insomnia). (Patient not taking: Reported on 8/31/2023) 30 tablet 11     sulfamethoxazole-trimethoprim 800-160mg (BACTRIM DS) 800-160 mg Tab One tablet by mouth every Monday, Wednesday, Friday to prevent lung infection 36 tablet 3     thiamine 100 MG tablet Take 100 mg by mouth once daily.          Review of Systems   Constitutional:  Negative for chills and fever.   HENT:  Negative for ear pain and facial swelling.    Eyes:  Negative for pain and redness.   Respiratory:  Negative for cough and shortness of breath.    Cardiovascular:  Negative for chest pain and leg swelling.   Gastrointestinal:  Negative for abdominal distention and abdominal pain.   Endocrine: Positive for cold intolerance and heat intolerance.   Genitourinary:  Negative for dyspareunia and dysuria.   Musculoskeletal:  Positive for arthralgias. Negative for back pain.   Skin:  Negative for rash and wound.   Allergic/Immunologic: Positive for immunocompromised state.   Neurological:  Positive for weakness. Negative for numbness.   Hematological:  Bruises/bleeds easily.   Psychiatric/Behavioral:  Positive for confusion. Negative for agitation.      Objective:     Vital Signs  (Most Recent):  Temp: 98.5 °F (36.9 °C) (10/14/23 0007)  Pulse: 85 (10/14/23 0007)  Resp: 16 (10/14/23 0007)  BP: 124/60 (10/14/23 0007)  SpO2: 97 % (10/14/23 0007)    Vital Signs Range (Last 24H):  Temp:  [97.4 °F (36.3 °C)-98.5 °F (36.9 °C)]   Pulse:  [78-92]   Resp:  [16-19]   BP: (112-144)/(60-83)   SpO2:  [96 %-98 %]        Physical Exam  Vitals and nursing note reviewed.   Constitutional:       Appearance: Normal appearance. She is normal weight.   HENT:      Head: Normocephalic and atraumatic.   Eyes:      Extraocular Movements: Extraocular movements intact.      Conjunctiva/sclera: Conjunctivae normal.      Pupils: Pupils are equal, round, and reactive to light.   Cardiovascular:      Rate and Rhythm: Normal rate and regular rhythm.   Pulmonary:      Effort: Pulmonary effort is normal.      Breath sounds: Normal breath sounds.   Abdominal:      General: Abdomen is flat. Bowel sounds are normal.      Palpations: Abdomen is soft.   Musculoskeletal:         General: Normal range of motion.      Cervical back: Normal range of motion.   Skin:     General: Skin is warm and dry.   Neurological:      Mental Status: She is alert. She is disoriented.      Motor: Weakness present.              Neurological Exam:   LOC: alert  Attention Span: Good   Language: confusion at times  Articulation: No dysarthria  Orientation: Person, Place, Time   Visual Fields: Full  EOM (CN III, IV, VI): Full/intact  Pupils (CN II, III): PERRL  Facial Sensation (CN V): Normal  Facial Movement (CN VII): Symmetric facial expression    Gag Reflex: present  Reflexes: flexor plantar responses bilaterally  Motor: Arm left  Paresis: 3/5  Leg left  Paresis: 3/5  Arm right  Normal 5/5  Leg right Normal 5/5  Cerebellum: No evidence of appendicular or axial ataxia  Sensation: Intact to light touch, temperature and vibration  Tone: Normal tone throughout      Laboratory:  CMP:   Recent Labs   Lab 10/13/23  0519   CALCIUM 8.5*   *   K 4.6   CO2  "18*      BUN 11   CREATININE 0.8     CBC:   Recent Labs   Lab 10/13/23  0519   WBC 6.49   RBC 4.16   HGB 11.0*   HCT 36.7*      MCV 88   MCH 26.4*   MCHC 30.0*     Lipid Panel: No results for input(s): "CHOL", "LDLCALC", "HDL", "TRIG" in the last 168 hours.  Coagulation: No results for input(s): "PT", "INR", "APTT" in the last 168 hours.  Hgb A1C: No results for input(s): "HGBA1C" in the last 168 hours.  TSH:   Recent Labs   Lab 10/11/23  1354   TSH 1.264       Diagnostic Results:      Brain imaging:  CT head w/o contrast 10-9-23 results:  1. No evidence of acute intracranial pathology.  If there is concern for acute ischemic etiology, consider MRI brain for further evaluation.  2. Chronic microvascular ischemic change and multifocal remote infarcts.    MRI Brain w/o contrast 10-13-23 results:    Acute infarct in the right occipital lobe.  No evidence of hemorrhagic conversion.     Remote lacunar type infarcts and chronic microvascular ischemic changes.    Vessel Imaging:      Cardiac Evaluation:   EKG 10-9-23 results:  Sinus rhythm with frequent Premature ventricular complexes Low voltage QRS Prolonged QT Abnormal ECG When compared with ECG of 31-AUG-2023 15:09, Premature ventricular complexes are now Present Nonspecific T wave abnormality now evident in Anterior leads Confirmed by Jerod MICHAEL MD ([id number]) on 10/9/2023 2:06:14 PM    "

## 2023-10-14 NOTE — SUBJECTIVE & OBJECTIVE
Interval History: Pt with no acute events overnight. MRI performed with results significant for acute right occipital lobe infarct. Neurology consulted. TTE performed.    Review of Systems   Constitutional:  Negative for fatigue and fever.   HENT:  Negative for congestion.    Respiratory:  Negative for cough and chest tightness.    Cardiovascular:  Negative for chest pain.   Gastrointestinal:  Negative for abdominal pain and nausea.   Musculoskeletal:  Negative for myalgias.   Neurological:  Negative for headaches.   Psychiatric/Behavioral:  Negative for behavioral problems.      Objective:     Vital Signs (Most Recent):  Temp: 98.5 °F (36.9 °C) (10/14/23 1121)  Pulse: 77 (10/14/23 1121)  Resp: 18 (10/14/23 1121)  BP: (Abnormal) 114/55 (10/14/23 1121)  SpO2: 97 % (10/14/23 1121) Vital Signs (24h Range):  Temp:  [97.6 °F (36.4 °C)-98.5 °F (36.9 °C)] 98.5 °F (36.9 °C)  Pulse:  [56-92] 77  Resp:  [16-18] 18  SpO2:  [94 %-98 %] 97 %  BP: (113-157)/(55-67) 114/55     Weight: 71.7 kg (158 lb)  Body mass index is 27.12 kg/m².    Intake/Output Summary (Last 24 hours) at 10/14/2023 1333  Last data filed at 10/14/2023 0628  Gross per 24 hour   Intake no documentation   Output 550 ml   Net -550 ml         Physical Exam  Vitals and nursing note reviewed.   Constitutional:       Appearance: She is well-developed.   HENT:      Head: Normocephalic and atraumatic.      Nose: Nose normal. No congestion.   Eyes:      General: No scleral icterus.        Right eye: No discharge.         Left eye: No discharge.      Extraocular Movements: Extraocular movements intact.      Conjunctiva/sclera: Conjunctivae normal.   Neck:      Thyroid: No thyromegaly.      Vascular: No JVD.   Cardiovascular:      Rate and Rhythm: Normal rate and regular rhythm.      Heart sounds: Normal heart sounds. No murmur heard.     No friction rub. No gallop.   Pulmonary:      Effort: Pulmonary effort is normal. No respiratory distress.      Breath sounds: Normal  breath sounds. No wheezing or rales.   Abdominal:      General: Bowel sounds are normal. There is no distension.      Palpations: Abdomen is soft.      Tenderness: There is no abdominal tenderness. There is no guarding or rebound.   Musculoskeletal:         General: No swelling or tenderness.      Cervical back: Neck supple.   Skin:     General: Skin is warm and dry.   Neurological:      Mental Status: She is alert. She is disoriented.      Cranial Nerves: No cranial nerve deficit.   Psychiatric:         Behavior: Behavior normal.             Significant Labs: All pertinent labs within the past 24 hours have been reviewed.  CBC:   Recent Labs   Lab 10/13/23  0519 10/14/23  0631   WBC 6.49 8.03   HGB 11.0* 9.6*   HCT 36.7* 31.2*    182     CMP:   Recent Labs   Lab 10/13/23  0519 10/14/23  0631   * 133*   K 4.6 4.3    105   CO2 18* 21*   GLU 76 88   BUN 11 12   CREATININE 0.8 1.0   CALCIUM 8.5* 8.5*   ANIONGAP 9 7*       Significant Imaging: I have reviewed all pertinent imaging results/findings within the past 24 hours.

## 2023-10-14 NOTE — PROGRESS NOTES
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  Patient is presenting from [facility], was recently discharged from hospital stay for similar picture about a month ago.  At that time no definitive infectious etiology was found, and it was concluded the patient was possibly experiencing and adrenal crisis and/or elderly delirium/dementia with a mild KERMIT at the time.  Daughter Rosy was at the bedside now reports that she started having decreased p.o. intake over the past few days, the SNF acility had obtained a UA that was suspicious for infection and sent it off but lost the specimen.  Patient was dosed with Cipro for about 3 days with no sustained improvement in her mental status/energy level/participation/p.o. intake. Dtr reports patient only complained of fatigue.  No chest pain or shortness a breath noted.    In ED: Hypotensive 90/40, responded to IVFs.  Creatinine bump is noted at 2.4 whereas baseline is under 2.  Patient has been refusing UA catheterizations and we are still pending collection.  EKG per my personal review shows no acute ischemic findings.  Chest x-ray also does not show any convincing findings for definitive pneumonia.          Overview/Hospital Course:  HypoT initially  with BP 90/40 in ED, given IVFs and stress dose steroids, improved to SBP 150s max overnight, but back down to SBP 110s the next am.  Pt initially declined straight cath for UA while in the ED, hence a delay in obtaining UA - obtained around 0500 the next day after admit, revealing for a UTI with >100 WBCs, 20 RBCs, good sample (only 1 sq epi).  Ongoing tachycardia (90s). New leukopenia (3k), KERMIT resolving, Cr 1.6 (from 2.4)    Dx sepsis 2/2 UTI, with KERMIT and acute encephalopathy, in immunocompromised pt on cellcept/plaquenil for RA.  Started CTX/Vanc STAT.  CTX for g negative coverage (h/o E coli UTI with MDR), Vanc for empiric Enterococcus coverage.  F/u UCx and BCx closely. HELD cellcept.   Discussed plan with caregiver Kathrin at bedside and daughter Madhavi on speaker phone.    BCx NGTD x5, UCx without growth, vancomycin discontinued. Mentation still not at baseline. EEG showing moderate to severe diffuse background slowing consistent with diffuse cerebral dysfunction and encephalopathy which may be on the basis of toxic, metabolic, or primary neuronal disorder. No gross abnormalities noted on metabolic profile. Follow up MRI ordered. Pt stated that she did not feel like going for the MRI on the morning or 10/13/23 because she had not eaten breakfast.     MRI performed on 10/14/23 with results significant for acute right occipital infarct. Neurology consulted with recs for anticoagulation and SBP <220. New infarct unlikely cause of pt's change in mentation, PT/OT/SLP recommended. TTE performed to rule out cardiac abnormality. Treatment for UTI continued through 10/17/23. Pt accepted into [facility] with tentative arrival date of 10/16/23.      Interval History: Pt with no acute events overnight. MRI performed with results significant for acute right occipital lobe infarct. Neurology consulted. TTE performed.    Review of Systems   Constitutional:  Negative for fatigue and fever.   HENT:  Negative for  congestion.    Respiratory:  Negative for cough and chest tightness.    Cardiovascular:  Negative for chest pain.   Gastrointestinal:  Negative for abdominal pain and nausea.   Musculoskeletal:  Negative for myalgias.   Neurological:  Negative for headaches.   Psychiatric/Behavioral:  Negative for behavioral problems.      Objective:     Vital Signs (Most Recent):  Temp: 98.5 °F (36.9 °C) (10/14/23 1121)  Pulse: 77 (10/14/23 1121)  Resp: 18 (10/14/23 1121)  BP: (Abnormal) 114/55 (10/14/23 1121)  SpO2: 97 % (10/14/23 1121) Vital Signs (24h Range):  Temp:  [97.6 °F (36.4 °C)-98.5 °F (36.9 °C)] 98.5 °F (36.9 °C)  Pulse:  [56-92] 77  Resp:  [16-18] 18  SpO2:  [94 %-98 %] 97 %  BP: (113-157)/(55-67) 114/55     Weight: 71.7 kg (158 lb)  Body mass index is 27.12 kg/m².    Intake/Output Summary (Last 24 hours) at 10/14/2023 1333  Last data filed at 10/14/2023 0628  Gross per 24 hour   Intake no documentation   Output 550 ml   Net -550 ml         Physical Exam  Vitals and nursing note reviewed.   Constitutional:       Appearance: She is well-developed.   HENT:      Head: Normocephalic and atraumatic.      Nose: Nose normal. No congestion.   Eyes:      General: No scleral icterus.        Right eye: No discharge.         Left eye: No discharge.      Extraocular Movements: Extraocular movements intact.      Conjunctiva/sclera: Conjunctivae normal.   Neck:      Thyroid: No thyromegaly.      Vascular: No JVD.   Cardiovascular:      Rate and Rhythm: Normal rate and regular rhythm.      Heart sounds: Normal heart sounds. No murmur heard.     No friction rub. No gallop.   Pulmonary:      Effort: Pulmonary effort is normal. No respiratory distress.      Breath sounds: Normal breath sounds. No wheezing or rales.   Abdominal:      General: Bowel sounds are normal. There is no distension.      Palpations: Abdomen is soft.      Tenderness: There is no abdominal tenderness. There is no guarding or rebound.   Musculoskeletal:          General: No swelling or tenderness.      Cervical back: Neck supple.   Skin:     General: Skin is warm and dry.   Neurological:      Mental Status: She is alert. She is disoriented.      Cranial Nerves: No cranial nerve deficit.   Psychiatric:         Behavior: Behavior normal.             Significant Labs: All pertinent labs within the past 24 hours have been reviewed.  CBC:   Recent Labs   Lab 10/13/23  0519 10/14/23  0631   WBC 6.49 8.03   HGB 11.0* 9.6*   HCT 36.7* 31.2*    182     CMP:   Recent Labs   Lab 10/13/23  0519 10/14/23  0631   * 133*   K 4.6 4.3    105   CO2 18* 21*   GLU 76 88   BUN 11 12   CREATININE 0.8 1.0   CALCIUM 8.5* 8.5*   ANIONGAP 9 7*       Significant Imaging: I have reviewed all pertinent imaging results/findings within the past 24 hours.      Assessment/Plan:      * Encephalopathy acute  Tx underlying sepsis/UTI  - monitor closely  - minimize CNS-active meds  - no improvement on ABx  - EEG negative for seizure but showing moderate to severe diffuse slowing  - MRI brain showing acute right occipital infarct - unlikely associated with symptoms. Neurology consult placed.   - For possible med withdrawal, resumed gabapentin and baclofen    Occipital cerebral infarction  Results for orders placed or performed during the hospital encounter of 10/09/23 (from the past 2160 hour(s))   MRI Brain Without Contrast    Impression    Acute infarct in the right occipital lobe.  No evidence of hemorrhagic conversion.    Remote lacunar type infarcts and chronic microvascular ischemic changes.    Case discussed with Dr. Laura Puente on 10/13/2023 at 21:47    Electronically signed by resident: Cristiana Buckner  Date:    10/13/2023  Time:    21:22    Electronically signed by: Flo Scott MD  Date:    10/13/2023  Time:    21:48     *Note: Due to a large number of results and/or encounters for the requested time period, some results have not been displayed. A complete set of results can be  "found in Results Review.     Antithrombotics for secondary stroke prevention: Anticoagulants: Apixaban 5 mg BID     Statins for secondary stroke prevention and hyperlipidemia, if present:   Statins: Atorvastatin- 80 mg daily    Aggressive risk factor modification: HTN, CAD     Rehab efforts: The patient has been evaluated by a stroke team provider and the therapy needs have been fully considered based off the presenting complaints and exam findings. The following therapy evaluations are needed: PT evaluate and treat, OT evaluate and treat, SLP evaluate and treat    Diagnostics ordered/pending: TTE to assess cardiac function/status     VTE prophylaxis: None: Reason for No Pharmacological VTE Prophylaxis: Currently on anticoagulation    BP parameters: Infarct: No intervention, SBP <220        Sepsis  This patient does have evidence of infective focus  My overall impression is sepsis.  Source: Urinary Tract  Antibiotics given-   Antibiotics (72h ago, onward)    Start     Stop Route Frequency Ordered    10/10/23 1000  cefTRIAXone (ROCEPHIN) 1 g in dextrose 5 % in water (D5W) 100 mL IVPB (MB+)         -- IV Every 24 hours (non-standard times) 10/10/23 0855    10/09/23 1845  sulfamethoxazole-trimethoprim 800-160mg per tablet 1 tablet         -- Oral Every Mon, Wed, Fri 10/09/23 1840        Latest lactate reviewed-  No results for input(s): "LACTATE" in the last 72 hours.  Organ dysfunction indicated by Acute kidney injury and Encephalopathy    Fluid challenge: s/p IVFs in ED    Post- resuscitation assessment No - Post resuscitation assessment not needed       Will Not start Pressors- Levophed for MAP of 65  Source control achieved by: ABx    See above    Anemia of renal disease  Stable  monitor      Acute kidney injury superimposed on chronic kidney disease  Due to vol depletion and sepsis- Resolved  Chronic medical disease noted on renal ultrasound on last admission   Admit creatinine at 2.4  Home Bactrim initially held " for KERMIT, resumed 10/10 qMWF for PJP prophylaxis in steroid dose >20mg    Chronic obstructive pulmonary disease  No active issues      Heart failure with preserved ejection fraction  Chronic diastolic CHF. No signs of overload. Volume depletion on presentation to ED, resuscitated with IVFs  - strict Is/Os   - daily weights, preferably standing   - tele  - keep Mg >2, Phos >3, K >4      Rheumatic torticollis        Vascular Dementia  Home Seroquel qhs p.r.n.  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        Anticoagulant long-term use        Spinal stenosis        Mood disorder        Mixed hyperlipidemia  - home statin      Post-traumatic osteoarthritis of both hips and shoulders  Home meds: gabapentin 100 qbreakfast and dinner, and 300 qhs (helps with sleep); baclofen 5mg qlunch, dinner, and hs  - resumed meds tonight 10/11 since KERMIT resolved at streamlined regimen   - gabapentin 100 TID, up-titrate as warranted  - baclofen 5 TID, adjust as warranted (caution for toxicity/overdose)      UTI (urinary tract infection)  Sepsis 2/2 UTI, with organ dysfunction - KERMIT on CKD and acute encephalopathy. UA with >100 WBCs.   Interestingly, UCx is negative.  Given her frequent UTIs and possible yield impacted by ABx given prior to admit, continue CTX IV while inpatient to complete 5-7 days total of ABx.  BCx negative  - CTX IV  - STOP Vanc IV      Altered mental status  See vascular dementia      Thrombocytopenia  Resolved 10/11  monitor      AF (paroxysmal atrial fibrillation)  Stable rate   Continue home Eliquis  - episode of SVT on 10/11  - consider toprol 12.5 PO daily    Vascular dementia  - home statin  - not on antiplatelet agents at home, will confirm again with family      Long QT interval  Tele  Keep lytes replaced      Rheumatoid arthritis of multiple sites  Home meds: mycophenolate and hydroxychloroquine for ILD and connective tissue manifestatons of RA  Stress dose steroids (eventually get down to home prednisone 10 mg)  Home plaquenil   HOLD Home Cellcept in setting of infection, resume asap      PUD (peptic ulcer disease)  Home PPI        VTE Risk Mitigation (From admission, onward)         Ordered     apixaban tablet 5 mg  2 times daily         10/14/23 1155     IP VTE HIGH RISK PATIENT  Once         10/10/23 0221     Place sequential compression device  Until discontinued         10/10/23 0221     Reason for No Pharmacological VTE Prophylaxis  Once        Question:  Reasons:  Answer:  Already adequately anticoagulated on oral Anticoagulants    10/09/23 1648                Discharge Planning   LETICIA: 10/16/2023     Code Status: Full Code   Is the patient medically ready for discharge?: Yes    Reason for patient still in hospital (select all that apply): Pending disposition  Discharge Plan A: Skilled Nursing Facility   Discharge Delays: None known at this time      Branden Dawkins MD  Department of Hospital Medicine   [facility] - Neurosurgery ([facility])

## 2023-10-15 NOTE — SUBJECTIVE & OBJECTIVE
Interval History: No acute events overnight. Pt  remains disoriented but closer to baseline mental status. She was alert and sitting up in bed this morning and responsive to questions.    Review of Systems   HENT:  Negative for congestion.    Respiratory:  Negative for cough.    Cardiovascular:  Negative for chest pain.   Gastrointestinal:  Negative for abdominal pain and nausea.   Neurological:  Negative for headaches.   Psychiatric/Behavioral:  Negative for behavioral problems.      Objective:     Vital Signs (Most Recent):  Temp: 98.6 °F (37 °C) (10/15/23 1237)  Pulse: 94 (10/15/23 1237)  Resp: 18 (10/15/23 1237)  BP: (Abnormal) 111/53 (10/15/23 1237)  SpO2: 96 % (10/15/23 1237) Vital Signs (24h Range):  Temp:  [97.3 °F (36.3 °C)-98.6 °F (37 °C)] 98.6 °F (37 °C)  Pulse:  [66-95] 94  Resp:  [12-19] 18  SpO2:  [96 %-100 %] 96 %  BP: (111-154)/(53-71) 111/53     Weight: 71.7 kg (158 lb)  Body mass index is 27.12 kg/m².    Intake/Output Summary (Last 24 hours) at 10/15/2023 1408  Last data filed at 10/15/2023 0639  Gross per 24 hour   Intake no documentation   Output 750 ml   Net -750 ml         Physical Exam  Vitals and nursing note reviewed.   Constitutional:       Appearance: She is well-developed.   HENT:      Head: Normocephalic and atraumatic.      Nose: Nose normal. No congestion.   Eyes:      General: No scleral icterus.        Right eye: No discharge.         Left eye: No discharge.      Extraocular Movements: Extraocular movements intact.      Conjunctiva/sclera: Conjunctivae normal.   Neck:      Thyroid: No thyromegaly.      Vascular: No JVD.   Cardiovascular:      Rate and Rhythm: Normal rate and regular rhythm.      Heart sounds: Normal heart sounds. No murmur heard.     No friction rub. No gallop.   Pulmonary:      Effort: Pulmonary effort is normal. No respiratory distress.      Breath sounds: Normal breath sounds. No wheezing or rales.   Abdominal:      General: Bowel sounds are normal. There is no  distension.      Palpations: Abdomen is soft.      Tenderness: There is no abdominal tenderness. There is no guarding or rebound.   Musculoskeletal:         General: No swelling or tenderness.      Cervical back: Neck supple.   Skin:     General: Skin is warm and dry.   Neurological:      Mental Status: She is alert. She is disoriented.      Cranial Nerves: No cranial nerve deficit.   Psychiatric:         Behavior: Behavior normal.             Significant Labs: All pertinent labs within the past 24 hours have been reviewed.  CBC:   Recent Labs   Lab 10/14/23  0631 10/15/23  1009   WBC 8.03 11.48   HGB 9.6* 11.9*   HCT 31.2* 39.1    207     CMP:   Recent Labs   Lab 10/14/23  0631 10/15/23  1009   * 134*   K 4.3 3.5    108   CO2 21* 15*   GLU 88 69*   BUN 12 9   CREATININE 1.0 0.8   CALCIUM 8.5* 8.7   ANIONGAP 7* 11       Significant Imaging: I have reviewed all pertinent imaging results/findings within the past 24 hours.

## 2023-10-15 NOTE — SUBJECTIVE & OBJECTIVE
Neurologic Chief Complaint: R occipital lacunar infarct     Subjective:     Interval History: No interval changes on exam. Continue apixaban 5 mg. Anticipating discharge tomorrow.     HPI, Past Medical, Family, and Social History remains the same as documented in the initial encounter.     Review of Systems   Constitutional:  Negative for chills and fever.   HENT:  Negative for rhinorrhea and trouble swallowing.    Eyes:  Negative for discharge.   Respiratory:  Negative for cough and shortness of breath.    Cardiovascular:  Negative for chest pain and leg swelling.   Gastrointestinal:  Negative for abdominal pain and nausea.   Genitourinary:  Negative for dysuria and hematuria.   Musculoskeletal:  Positive for arthralgias. Negative for myalgias.   Neurological:  Negative for tremors and headaches.   Psychiatric/Behavioral:  Positive for confusion. Negative for agitation.      Scheduled Meds:   albuterol-ipratropium  3 mL Nebulization BID    apixaban  5 mg Oral BID    atorvastatin  40 mg Oral QHS    baclofen  5 mg Oral TID    cefTRIAXone (ROCEPHIN) IVPB  1 g Intravenous Q24H    fluticasone furoate-vilanteroL  1 puff Inhalation Daily    gabapentin  100 mg Oral TID    hydroxychloroquine  200 mg Oral BID    magnesium oxide  400 mg Oral Daily    magnesium sulfate IVPB  2 g Intravenous Once    [START ON 10/16/2023] predniSONE  10 mg Oral Daily    sulfamethoxazole-trimethoprim 800-160mg  1 tablet Oral Every Mon, Wed, Fri    thiamine  100 mg Oral Daily     Continuous Infusions:  PRN Meds:albuterol sulfate **AND** ipratropium, glucagon (human recombinant), glucose, glucose, melatonin, naloxone, QUEtiapine, simethicone, sodium chloride 0.9%, sodium chloride 0.9%    Objective:     Vital Signs (Most Recent):  Temp: 98.6 °F (37 °C) (10/15/23 1237)  Pulse: 94 (10/15/23 1237)  Resp: 18 (10/15/23 1237)  BP: (!) 111/53 (10/15/23 1237)  SpO2: 96 % (10/15/23 1237)  BP Location: Left arm    Vital Signs Range (Last 24H):  Temp:  [97.3  "°F (36.3 °C)-98.6 °F (37 °C)]   Pulse:  [66-95]   Resp:  [12-19]   BP: (111-154)/(53-71)   SpO2:  [96 %-100 %]   BP Location: Left arm       Physical Exam  Eyes:      Comments: Right eye ptosis from prior stroke.              Neurological Exam:   LOC: alert  Attention Span: Good   Language: No aphasia  Articulation: Dysarthria  Orientation: Person, Place, Time   EOM (CN III, IV, VI): Ptosis right  Facial Sensation (CN V): Normal  Facial Movement (CN VII): Symmetric facial expression    Motor: Arm left  Paresis: 3/5  Leg left  Paresis: 2/5  Arm right  Normal 5/5  Leg right Normal 5/5  Sensation: Intact to light touch, temperature and vibration    Laboratory:  CMP:   Recent Labs   Lab 10/15/23  1009   CALCIUM 8.7   *   K 3.5   CO2 15*      BUN 9   CREATININE 0.8     CBC:   Recent Labs   Lab 10/15/23  1009   WBC 11.48   RBC 4.48   HGB 11.9*   HCT 39.1      MCV 87   MCH 26.6*   MCHC 30.4*     Lipid Panel: No results for input(s): "CHOL", "LDLCALC", "HDL", "TRIG" in the last 168 hours.  Hgb A1C: No results for input(s): "HGBA1C" in the last 168 hours.  TSH:   Recent Labs   Lab 10/11/23  1354   TSH 1.264       Diagnostic Results     Brain Imaging   MRI small right occipital lacunar infarct  CT H no hemorrhage     Cardiac Imaging   EF 65% with prior mass and left atrial dilation   "

## 2023-10-15 NOTE — PLAN OF CARE
Problem: Skin Injury Risk Increased  Goal: Skin Health and Integrity  Outcome: Ongoing, Progressing  Intervention: Optimize Skin Protection  Flowsheets (Taken 10/15/2023 1807)  Head of Bed (HOB) Positioning: HOB elevated     Problem: Fall Injury Risk  Goal: Absence of Fall and Fall-Related Injury  Outcome: Ongoing, Progressing  Intervention: Promote Injury-Free Environment  Flowsheets (Taken 10/15/2023 1807)  Safety Promotion/Fall Prevention: bed alarm set

## 2023-10-15 NOTE — PROGRESS NOTES
Francisco Lyons - Neurosurgery ([facility])  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  Patient is presenting from [facility], was recently discharged from hospital stay for similar picture about a month ago.  At that time no definitive infectious etiology was found, and it was concluded the patient was possibly experiencing and adrenal crisis and/or elderly delirium/dementia with a mild KERMIT at the time.  Daughter Rosy was at the bedside now reports that she started having decreased p.o. intake over the past few days, the SNF acility had obtained a UA that was suspicious for infection and sent it off but lost the specimen.  Patient was dosed with Cipro for about 3 days with no sustained improvement in her mental status/energy level/participation/p.o. intake. Dtr reports patient only complained of fatigue.  No chest pain or shortness a breath noted.    In ED: Hypotensive 90/40, responded to IVFs.  Creatinine bump is noted at 2.4 whereas baseline is under 2.  Patient has been refusing UA catheterizations and we are still pending collection.  EKG per my personal review shows no acute ischemic findings.  Chest x-ray also does not show any convincing findings for definitive pneumonia.          Overview/Hospital Course:  HypoT initially  with BP 90/40 in ED, given IVFs and stress dose steroids, improved to SBP 150s max overnight, but back down to SBP 110s the next am.  Pt initially declined straight cath for UA while in the ED, hence a delay in obtaining UA - obtained around 0500 the next day after admit, revealing for a UTI with >100 WBCs, 20 RBCs, good sample (only 1 sq epi).  Ongoing tachycardia (90s). New leukopenia (3k), KERMIT resolving, Cr 1.6 (from 2.4)    Dx sepsis 2/2 UTI, with KERMIT and acute encephalopathy, in immunocompromised pt on cellcept/plaquenil for RA.  Started CTX/Vanc STAT.  CTX for g negative coverage (h/o E coli UTI with MDR), Vanc for empiric Enterococcus coverage.  F/u UCx and BCx closely. HELD cellcept.   Discussed plan with caregiver Kathrin at bedside and daughter Madhavi on speaker phone.    BCx NGTD x5, UCx without growth, vancomycin discontinued. Mentation still not at baseline. EEG showing moderate to severe diffuse background slowing consistent with diffuse cerebral dysfunction and encephalopathy which may be on the basis of toxic, metabolic, or primary neuronal disorder. No gross abnormalities noted on metabolic profile. Follow up MRI ordered. Pt stated that she did not feel like going for the MRI on the morning or 10/13/23 because she had not eaten breakfast.     MRI performed on 10/14/23 with results significant for acute right occipital infarct. Neurology consulted with recs for anticoagulation and SBP <220. New infarct unlikely cause of pt's change in mentation, PT/OT/SLP recommended. TTE performed to rule out cardiac abnormality. Treatment for UTI continued through 10/17/23. Pt's mental status trending towards baseline. Pt accepted into [facility] with tentative arrival date of 10/16/23.      Interval History: No acute events overnight. Pt  remains disoriented but closer to baseline mental status. She was alert and sitting up in bed this morning and responsive to questions.    Review of Systems   HENT:   Negative for congestion.    Respiratory:  Negative for cough.    Cardiovascular:  Negative for chest pain.   Gastrointestinal:  Negative for abdominal pain and nausea.   Neurological:  Negative for headaches.   Psychiatric/Behavioral:  Negative for behavioral problems.      Objective:     Vital Signs (Most Recent):  Temp: 98.6 °F (37 °C) (10/15/23 1237)  Pulse: 94 (10/15/23 1237)  Resp: 18 (10/15/23 1237)  BP: (Abnormal) 111/53 (10/15/23 1237)  SpO2: 96 % (10/15/23 1237) Vital Signs (24h Range):  Temp:  [97.3 °F (36.3 °C)-98.6 °F (37 °C)] 98.6 °F (37 °C)  Pulse:  [66-95] 94  Resp:  [12-19] 18  SpO2:  [96 %-100 %] 96 %  BP: (111-154)/(53-71) 111/53     Weight: 71.7 kg (158 lb)  Body mass index is 27.12 kg/m².    Intake/Output Summary (Last 24 hours) at 10/15/2023 1408  Last data filed at 10/15/2023 0639  Gross per 24 hour   Intake no documentation   Output 750 ml   Net -750 ml         Physical Exam  Vitals and nursing note reviewed.   Constitutional:       Appearance: She is well-developed.   HENT:      Head: Normocephalic and atraumatic.      Nose: Nose normal. No congestion.   Eyes:      General: No scleral icterus.        Right eye: No discharge.         Left eye: No discharge.      Extraocular Movements: Extraocular movements intact.      Conjunctiva/sclera: Conjunctivae normal.   Neck:      Thyroid: No thyromegaly.      Vascular: No JVD.   Cardiovascular:      Rate and Rhythm: Normal rate and regular rhythm.      Heart sounds: Normal heart sounds. No murmur heard.     No friction rub. No gallop.   Pulmonary:      Effort: Pulmonary effort is normal. No respiratory distress.      Breath sounds: Normal breath sounds. No wheezing or rales.   Abdominal:      General: Bowel sounds are normal. There is no distension.      Palpations: Abdomen is soft.      Tenderness: There is no abdominal tenderness. There is no guarding or rebound.   Musculoskeletal:         General: No swelling or tenderness.      Cervical back: Neck  supple.   Skin:     General: Skin is warm and dry.   Neurological:      Mental Status: She is alert. She is disoriented.      Cranial Nerves: No cranial nerve deficit.   Psychiatric:         Behavior: Behavior normal.             Significant Labs: All pertinent labs within the past 24 hours have been reviewed.  CBC:   Recent Labs   Lab 10/14/23  0631 10/15/23  1009   WBC 8.03 11.48   HGB 9.6* 11.9*   HCT 31.2* 39.1    207     CMP:   Recent Labs   Lab 10/14/23  0631 10/15/23  1009   * 134*   K 4.3 3.5    108   CO2 21* 15*   GLU 88 69*   BUN 12 9   CREATININE 1.0 0.8   CALCIUM 8.5* 8.7   ANIONGAP 7* 11       Significant Imaging: I have reviewed all pertinent imaging results/findings within the past 24 hours.      Assessment/Plan:      * Encephalopathy acute  Tx underlying sepsis/UTI  - monitor closely  - minimize CNS-active meds  - no improvement on ABx  - EEG negative for seizure but showing moderate to severe diffuse slowing  - MRI brain showing acute right occipital infarct - unlikely associated with symptoms. Neurology consult placed.   - For possible med withdrawal, resumed gabapentin and baclofen    Occipital cerebral infarction  Results for orders placed or performed during the hospital encounter of 10/09/23 (from the past 2160 hour(s))   MRI Brain Without Contrast    Impression    Acute infarct in the right occipital lobe.  No evidence of hemorrhagic conversion.    Remote lacunar type infarcts and chronic microvascular ischemic changes.    Case discussed with Dr. Laura Puente on 10/13/2023 at 21:47    Electronically signed by resident: Cristiana Buckner  Date:    10/13/2023  Time:    21:22    Electronically signed by: Flo Scott MD  Date:    10/13/2023  Time:    21:48     *Note: Due to a large number of results and/or encounters for the requested time period, some results have not been displayed. A complete set of results can be found in Results Review.     Antithrombotics for secondary  "stroke prevention: Anticoagulants: Apixaban 5 mg BID     Statins for secondary stroke prevention and hyperlipidemia, if present:   Statins: Atorvastatin- 80 mg daily    Aggressive risk factor modification: HTN, CAD     Rehab efforts: The patient has been evaluated by a stroke team provider and the therapy needs have been fully considered based off the presenting complaints and exam findings. The following therapy evaluations are needed: PT evaluate and treat, OT evaluate and treat, SLP evaluate and treat    Diagnostics ordered/pending: TTE to assess cardiac function/status     VTE prophylaxis: None: Reason for No Pharmacological VTE Prophylaxis: Currently on anticoagulation    BP parameters: Infarct: No intervention, SBP <220        Sepsis  This patient does have evidence of infective focus  My overall impression is sepsis.  Source: Urinary Tract  Antibiotics given-   Antibiotics (72h ago, onward)    Start     Stop Route Frequency Ordered    10/10/23 1000  cefTRIAXone (ROCEPHIN) 1 g in dextrose 5 % in water (D5W) 100 mL IVPB (MB+)         -- IV Every 24 hours (non-standard times) 10/10/23 0855    10/09/23 1845  sulfamethoxazole-trimethoprim 800-160mg per tablet 1 tablet         -- Oral Every Mon, Wed, Fri 10/09/23 1840        Latest lactate reviewed-  No results for input(s): "LACTATE" in the last 72 hours.  Organ dysfunction indicated by Acute kidney injury and Encephalopathy    Fluid challenge: s/p IVFs in ED    Post- resuscitation assessment No - Post resuscitation assessment not needed       Will Not start Pressors- Levophed for MAP of 65  Source control achieved by: ABx    See above    Anemia of renal disease  Stable  monitor      Acute kidney injury superimposed on chronic kidney disease  Due to vol depletion and sepsis- Resolved  Chronic medical disease noted on renal ultrasound on last admission   Admit creatinine at 2.4  Home Bactrim initially held for KERMIT, resumed 10/10 qMWF for PJP prophylaxis in steroid " dose >20mg    Chronic obstructive pulmonary disease  No active issues      Heart failure with preserved ejection fraction  Chronic diastolic CHF. No signs of overload. Volume depletion on presentation to ED, resuscitated with IVFs  - strict Is/Os   - daily weights, preferably standing   - tele  - keep Mg >2, Phos >3, K >4      Rheumatic torticollis        Vascular Dementia  Home Seroquel qhs p.r.n.  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        Anticoagulant long-term use        Spinal stenosis        Mood disorder        Mixed hyperlipidemia  - home statin      Post-traumatic osteoarthritis of both hips and shoulders  Home meds: gabapentin 100 qbreakfast and dinner, and 300 qhs (helps with sleep); baclofen 5mg qlunch, dinner, and hs  - resumed meds tonight 10/11 since KERMIT resolved at streamlined regimen   - gabapentin 100 TID, up-titrate as warranted  - baclofen 5 TID, adjust as warranted (caution for toxicity/overdose)      UTI (urinary tract infection)  Sepsis 2/2 UTI, with organ dysfunction - KERMIT on CKD and acute encephalopathy. UA with >100 WBCs.  Interestingly, UCx is negative.  Given her frequent UTIs and  possible yield impacted by ABx given prior to admit, continue CTX IV while inpatient to complete 5-7 days total of ABx.  BCx negative  - CTX IV  - STOP Vanc IV      Altered mental status  See vascular dementia      Thrombocytopenia  Resolved 10/11  monitor      AF (paroxysmal atrial fibrillation)  Stable rate   Continue home Eliquis  - episode of SVT on 10/11  - consider toprol 12.5 PO daily    Vascular dementia  - home statin  - not on antiplatelet agents at home, will confirm again with family      Long QT interval  Tele  Keep lytes replaced      Rheumatoid arthritis of multiple sites  Home meds: mycophenolate and hydroxychloroquine for ILD and connective tissue manifestatons of RA  Stress dose steroids (eventually get down to home prednisone 10 mg)  Home plaquenil   HOLD Home Cellcept in setting of infection, resume asap      PUD (peptic ulcer disease)  Home PPI        VTE Risk Mitigation (From admission, onward)         Ordered     apixaban tablet 5 mg  2 times daily         10/14/23 1155     IP VTE HIGH RISK PATIENT  Once         10/10/23 0221     Place sequential compression device  Until discontinued         10/10/23 0221     Reason for No Pharmacological VTE Prophylaxis  Once        Question:  Reasons:  Answer:  Already adequately anticoagulated on oral Anticoagulants    10/09/23 1648                Discharge Planning   LETICIA: 10/16/2023     Code Status: Full Code   Is the patient medically ready for discharge?: Yes    Reason for patient still in hospital (select all that apply): Pending disposition  Discharge Plan A: Skilled Nursing Facility   Discharge Delays: None known at this time        Branden Dawkins MD  Department of Hospital Medicine   Francisco tacos - Neurosurgery ([facility])

## 2023-10-15 NOTE — ASSESSMENT & PLAN NOTE
88F with hx of vascular dementia , afib on eliquis, and prior stroke ( 12/2021 R SDH, 6/2022 R paredes, 7/2022 L parietal) was admitted to hospital medicine for encephalopathy and UTI. Vascular neurology consulted 10/13. MRI Brain with small right occipital lacunar infarct. NIHSS 6. TTE EF 65% with prior mass and dilated LA. Etiology likely attributed to cardioembolic source.     Recommendations:    - Continue apixaban at 5 mg BID  - Atorvastatin 40 mg  - Risk factors including hypertension, hypelipidemia, history of stroke, afib  - Mechanical SCD VTE prophylaxis  - SBP<220  - PT/OT/SLP evaluation while admitted  - Dispo: anticipate discharge 10/16

## 2023-10-15 NOTE — PLAN OF CARE
Problem: Adjustment to Illness (Delirium)  Goal: Optimal Coping  Outcome: Ongoing, Progressing     Problem: Attention and Thought Clarity Impairment (Delirium)  Goal: Improved Attention and Thought Clarity  Outcome: Ongoing, Progressing     Problem: Sleep Disturbance (Delirium)  Goal: Improved Sleep  Outcome: Ongoing, Progressing     Problem: Adult Inpatient Plan of Care  Goal: Plan of Care Review  Outcome: Ongoing, Progressing  Goal: Absence of Hospital-Acquired Illness or Injury  Outcome: Ongoing, Progressing  Goal: Optimal Comfort and Wellbeing  Outcome: Ongoing, Progressing     Problem: Impaired Wound Healing  Goal: Optimal Wound Healing  Outcome: Ongoing, Progressing     Problem: Skin Injury Risk Increased  Goal: Skin Health and Integrity  Outcome: Ongoing, Progressing     Problem: Fall Injury Risk  Goal: Absence of Fall and Fall-Related Injury  Outcome: Ongoing, Progressing

## 2023-10-15 NOTE — ASSESSMENT & PLAN NOTE
The patient has a history of stroke, vascular dementia, new lacunar infarct and concurrent UTI which are all contributing to her current encephalopathy.

## 2023-10-15 NOTE — ASSESSMENT & PLAN NOTE
Patient with baseline left sided hemiparesis due to prior strokes. Educated patient and family about risk factors including hypertension, history of stroke, and afib to name a few.

## 2023-10-15 NOTE — ASSESSMENT & PLAN NOTE
88F with hx of vascular dementia , afib on eliquis, and prior stroke ( 12/2021 R SDH, 6/2022 R paredes, 7/2022 L parietal) was admitted to hospital medicine for encephalopathy and UTI. Vascular neurology consulted 10/13. MRI Brain with small right occipital lacunar infarct. NIHSS 8. TTE EF 65% with prior mass and dilated LA. Etiology likely attributed to hypertension and cardioembolic source.     Recommendations:    - Continue apixaban at 5 mg BID  - Atorvastatin 40 mg  - Risk factors including hypertension, hypelipidemia, history of stroke, afib  - Mechanical SCD VTE prophylaxis  - SBP<220  - PT/OT/SLP evaluation while admitted  - Dispo: anticipate discharge 10/16

## 2023-10-16 NOTE — ASSESSMENT & PLAN NOTE
Home meds: gabapentin 100 qbreakfast and dinner, and 300 qhs (helps with sleep); baclofen 5mg qlunch, dinner, and hs  - resumed meds tonight 10/11 since KERMIT resolved at streamlined regimen   - gabapentin 100 TID  - baclofen 5 TID, adjust as warranted (caution for toxicity/overdose)

## 2023-10-16 NOTE — ASSESSMENT & PLAN NOTE
"This patient does have evidence of infective focus  My overall impression is sepsis.  Source: Urinary Tract  Antibiotics given-   Antibiotics (72h ago, onward)    Start     Stop Route Frequency Ordered    10/09/23 1845  sulfamethoxazole-trimethoprim 800-160mg per tablet 1 tablet         -- Oral Every Mon, Wed, Fri 10/09/23 1840        Latest lactate reviewed-  No results for input(s): "LACTATE" in the last 72 hours.  Organ dysfunction indicated by Acute kidney injury and Encephalopathy    Fluid challenge: s/p IVFs in ED    Post- resuscitation assessment No - Post resuscitation assessment not needed       Will Not start Pressors- Levophed for MAP of 65  Source control achieved by: ABx    See above  "

## 2023-10-16 NOTE — PT/OT/SLP PROGRESS
Physical Therapy Co-Treatment    Patient Name: Selma Lux   MRN: [id number]    Co-treatment performed for this visit due to patient need for two skilled therapists to ensure patient and staff safety and to accommodate for patient activity tolerance/pain management   Recommendations:     Discharge Recommendations: Low Intensity Therapy  Discharge Equipment Recommendations: none  Barriers to discharge: Increased level of assist    Assessment:     Selma Lux is a 88 y.o. female admitted with a medical diagnosis of Encephalopathy acute. She presents with the following impairments/functional limitations: weakness, impaired endurance, impaired self care skills, impaired functional mobility, gait instability, impaired balance, impaired cognition, impaired coordination, decreased upper extremity function, decreased lower extremity function, decreased safety awareness. Pt with improved tolerance to therapy treatment as compared to previous sessions. Pt with improved cognition and understanding of goals of therapy on this date. Pt continues to require increased assist with functional mobility 2/2 generalized deconditioning, AMS, and poor aerobic endurance. Pt demonstrates improved use of LUE to maintain static and dynamic postural stability while sitting at EOB and during bimanual ADL tasks. Pt would continue to benefit from skilled acute PT in order to address current deficits and progress functional mobility.     Rehab Prognosis: Fair; patient continues to benefit from acute skilled PT services to address these deficits and reach maximum level of function.  Recent Surgery: * No surgery found *      Plan:     During this hospitalization, patient to be seen 3 x/week to address the identified rehab impairments via gait training, therapeutic activities, therapeutic exercises, neuromuscular re-education and progress toward the following goals:    Plan of Care Expires:  11/10/23    Subjective     Chief Complaint:  "None verbalized  Patient/Family Comments/Goals: "Ok goodbye"  Pain/Comfort:  Pain Rating 1:  (did not rate)    Objective:     Communicated with RN prior to session. Patient found HOB elevated with bed alarm, telemetry, SCD, peripheral IV upon PT entry to room.     General Precautions: Standard, fall, aspiration  Orthopedic Precautions: N/A  Braces: N/A    Functional Mobility:  Bed Mobility: Verbal and tactile cues provided for sequencing and technique  Supine to Sit: maximal assistance for LE management and trunk management with HOB elevated using bed rail  Sit to Supine: maximal assistance for LE management with HOB flat  Scooting: anteriorly to place BLE on floor; moderate assist  Transfers:    Sit to Stand: maximal assistance of 2 persons with no AD with cues for hand placement, foot placement, upright posture, and increased glute activation  Balance:   Static Sitting: Fair, able to maintain for 12 minute(s) with SBA-min  Verbal cueing provided for upright posture, increased head extension, improved use of BUE to support midline orientation, and command following  LUE placed in weightbearing position for ~5 min of upright sitting in order to increase joint proprioception and encourage increased use  Dynamic Sitting: Fair: Patient accepts minimal challenge, SBA-min     Pt completing UE ADLs and grooming while sitting at EOB   Pt completed 2x5 LAQ to BLE with increased verbal cueing for motor activation    AM-PAC 6 CLICK MOBILITY  Turning over in bed (including adjusting bedclothes, sheets and blankets)?: 2  Sitting down on and standing up from a chair with arms (e.g., wheelchair, bedside commode, etc.): 1  Moving from lying on back to sitting on the side of the bed?: 2  Moving to and from a bed to a chair (including a wheelchair)?: 1  Need to walk in hospital room?: 1  Climbing 3-5 steps with a railing?: 1  Basic Mobility Total Score: 8     Therapeutic Activities and Exercises:  Patient educated on role of acute " care PT and PT POC, safety while in hospital including calling nurse for mobility, and call light usage  Answered all questions within PT scope of practice and addressed functional mobility concerns.  Upon returning pt to supine, pt's IV became dislodged and moderately bleeding. RN notified to bedside. Gown and chuckpads changed.  Pt oriented to person, place, time, and situation.    Patient left left sidelying with HOB elevated with all lines intact, call button in reach, RN notified, bed alarm on, and caregiver present.    GOALS:   Multidisciplinary Problems       Physical Therapy Goals          Problem: Physical Therapy    Goal Priority Disciplines Outcome Goal Variances Interventions   Physical Therapy Goal     PT, PT/OT Ongoing, Progressing     Description: Goals to be met by: 11/10/2023     Patient will increase functional independence with mobility by performin. Supine to sit with MInimal Assistance  2. Sit to supine with MInimal Assistance  3. Sit to stand transfer with Minimal Assistance  4. Bed to chair transfer with Moderate Assistance using LRAD  5. Gait  x 15 feet with Moderate Assistance using LRAD   6. Sitting at edge of bed x10 minutes with Supervision  7. Lower extremity exercise program x15 reps per handout, with assistance as needed                         Time Tracking:     PT Received On: 10/16/23  PT Start Time: 1017     PT Stop Time: 1042  PT Total Time (min): 25 min     Billable Minutes: Therapeutic Activity 12 and Neuromuscular Re-education 12      Treatment Type: Treatment  PT/PTA: PT     Number of PTA visits since last PT visit: 0     10/16/2023

## 2023-10-16 NOTE — ASSESSMENT & PLAN NOTE
- managed with prn gabapentin cream compounded at outside pharmacy, otherwise managed with lidocaine patches

## 2023-10-16 NOTE — DISCHARGE SUMMARY
Francisco Lyons - Neurosurgery ([facility]  [facility] Medicine  Discharge Summary      Patient Name: Selma Lux  MRN: [id number]  JAZZMINE: [id number]  Patient Class: IP- Inpatient  Admission Date: 10/9/2023  Hospital Length of Stay: 7 days  Discharge Date and Time:  10/16/2023 3:13 PM  Attending Physician: Karen Mcginnis MD   Discharging Provider: Ramakrishna Gleason MD  Primary Care Provider: Génesis Rosario MD  [facility] 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  Patient is presenting from [facility], was recently discharged from hospital stay for similar picture about a month ago.  At that time no definitive infectious etiology was found, and it was concluded the patient was possibly experiencing and adrenal crisis and/or elderly delirium/dementia with a mild KERMIT at the time.  Daughter Rosy was at the bedside now reports that she started having decreased p.o. intake over the past few days, the SNF acility had obtained a UA that was suspicious for infection and sent it off but lost the specimen.  Patient was dosed with Cipro for about 3 days with no sustained improvement in her mental status/energy level/participation/p.o. intake. Dtr reports patient only complained of fatigue.  No chest pain or shortness a breath noted.    In ED: Hypotensive 90/40, responded to IVFs.  Creatinine bump is noted at 2.4 whereas baseline is under 2.  Patient has been refusing UA catheterizations and we are still pending collection.  EKG per my personal review shows no acute  ischemic findings.  Chest x-ray also does not show any convincing findings for definitive pneumonia.          * No surgery found *      Hospital Course:   HypoT initially with BP 90/40 in ED, given IVFs and stress dose steroids, improved to SBP 150s max overnight, but back down to SBP 110s the next am.  Pt initially declined straight cath for UA while in the ED, hence a delay in obtaining UA - obtained around 0500 the next day after admit, revealing for a UTI with >100 WBCs, 20 RBCs, good sample (only 1 sq epi).  Ongoing tachycardia (90s). New leukopenia (3k), KERMIT resolving, Cr 1.6 (from 2.4)    Dx sepsis 2/2 UTI, with KERMIT and acute encephalopathy, in immunocompromised pt on cellcept/plaquenil for RA.  Started CTX/Vanc STAT.  CTX for g negative coverage (h/o E coli UTI with MDR), Vanc for empiric Enterococcus coverage.  F/u UCx and BCx closely. HELD cellcept.   Discussed plan with caregiver Kathrin at bedside and daughter Madhavi on speaker phone.    BCx NGTD x5, UCx without growth, vancomycin discontinued. Mentation still not at baseline. EEG showing moderate to severe diffuse background slowing consistent with diffuse cerebral dysfunction and encephalopathy which may be on the basis of toxic, metabolic, or primary neuronal disorder. No gross abnormalities noted on metabolic profile. Follow up MRI ordered. Pt stated that she did not feel like going for the MRI on the morning or 10/13/23 because she had not eaten breakfast.     MRI performed on 10/14/23 with results significant for acute right occipital infarct. Neurology consulted with recs for anticoagulation and SBP <220. New infarct unlikely cause of pt's change in mentation, PT/OT/SLP recommended. TTE performed to rule out cardiac abnormality. Treatment for UTI continued through 10/17/23. Pt's mental status trending towards baseline. Pt accepted into [facility] with tentative arrival date of 10/16/23.       Goals of Care Treatment Preferences:  Code  Status: Full Code    Living Will: Yes     What is most important right now is to focus on extending life as long as possible, even it it means sacrificing quality, curative/life-prolongation (regardless of treatment burdens).  Accordingly, we have decided that the best plan to meet the patient's goals includes continuing with treatment.      Consults:   Consults (From admission, onward)        Status Ordering Provider     Inpatient consult to Vascular (Stroke) Neurology  Once        Provider:  (Not yet assigned)    Completed LUZ REECE     Inpatient consult to Registered Dietitian/Nutritionist  Once        Provider:  (Not yet assigned)    Completed JUAN JOSE TUCKER     IP consult to case management  Once        Provider:  (Not yet assigned)    Acknowledged JUAN JOSE TUCKER        Interval History: NAEON, patient reports no new symptoms. Pleasantly confused.    Review of Systems   Constitutional:  Negative for activity change, appetite change and fever.   HENT:  Negative for congestion.    Respiratory:  Positive for cough (chronic). Negative for shortness of breath and wheezing.    Cardiovascular:  Negative for chest pain and leg swelling.   Gastrointestinal:  Negative for abdominal pain, constipation, diarrhea, nausea and vomiting.   Skin:  Negative for rash.   Neurological:  Negative for headaches.   Psychiatric/Behavioral:  Negative for behavioral problems.      Objective:     Vital Signs (Most Recent):  Temp: 98.1 °F (36.7 °C) (10/16/23 1125)  Pulse: 82 (10/16/23 1451)  Resp: 14 (10/16/23 1125)  BP: (!) 133/59 (10/16/23 1125)  SpO2: 96 % (10/16/23 1125) Vital Signs (24h Range):  Temp:  [97 °F (36.1 °C)-98.9 °F (37.2 °C)] 98.1 °F (36.7 °C)  Pulse:  [67-90] 82  Resp:  [14-18] 14  SpO2:  [96 %-99 %] 96 %  BP: (132-155)/(59-76) 133/59     Weight: 71.7 kg (158 lb)  Body mass index is 27.12 kg/m².  No intake or output data in the 24 hours ending 10/16/23 1502      Physical Exam  Vitals and nursing note  reviewed.   HENT:      Head: Normocephalic and atraumatic.      Mouth/Throat:      Mouth: Mucous membranes are moist.      Pharynx: Oropharynx is clear.   Eyes:      Extraocular Movements: Extraocular movements intact.      Conjunctiva/sclera: Conjunctivae normal.   Cardiovascular:      Rate and Rhythm: Normal rate and regular rhythm.      Pulses: Normal pulses.   Pulmonary:      Effort: Pulmonary effort is normal. No respiratory distress.      Breath sounds: No wheezing or rales.   Abdominal:      Palpations: Abdomen is soft.      Tenderness: There is no abdominal tenderness. There is no guarding.   Musculoskeletal:      Right lower leg: No edema.      Left lower leg: No edema.   Skin:     General: Skin is warm and dry.   Neurological:      General: No focal deficit present.      Mental Status: She is alert. She is disoriented.      Comments: Baseline disorientation   Psychiatric:         Mood and Affect: Mood normal.     Neuro  * Encephalopathy acute  Tx underlying sepsis/UTI  - monitor closely. improving  - minimize CNS-active meds  - no improvement on ABx  - EEG negative for seizure but showing moderate to severe diffuse slowing  - MRI brain showing acute right occipital infarct - unlikely associated with symptoms. Neurology consult placed.   - For possible med withdrawal, resumed gabapentin and baclofen    Occipital cerebral infarction  Results for orders placed or performed during the hospital encounter of 10/09/23 (from the past 2160 hour(s))   MRI Brain Without Contrast    Impression    Acute infarct in the right occipital lobe.  No evidence of hemorrhagic conversion.    Remote lacunar type infarcts and chronic microvascular ischemic changes.    Case discussed with Dr. Laura Puente on 10/13/2023 at 21:47    Electronically signed by resident: Cristiana Buckner  Date:    10/13/2023  Time:    21:22    Electronically signed by: Flo Scott MD  Date:    10/13/2023  Time:    21:48     *Note: Due to a large number  of results and/or encounters for the requested time period, some results have not been displayed. A complete set of results can be found in Results Review.     Antithrombotics for secondary stroke prevention: Anticoagulants: Apixaban 5 mg BID     Statins for secondary stroke prevention and hyperlipidemia, if present:   Statins: Atorvastatin- 80 mg daily    Aggressive risk factor modification: HTN, CAD     Rehab efforts: The patient has been evaluated by a stroke team provider and the therapy needs have been fully considered based off the presenting complaints and exam findings. 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 No signs of overload. Volume depletion on presentation to ED, resuscitated with IVFs  - strict Is/Os   - daily weights, preferably standing   - tele  - keep Mg >2, Phos >3, K >4      Mixed hyperlipidemia  - home statin      AF (paroxysmal atrial fibrillation)  Stable rate   Continue home  "Eliquis  - episode of SVT on 10/11  - consider toprol 12.5 PO daily    Long QT interval  Tele  Keep lytes replaced      Renal/  Acute kidney injury superimposed on chronic kidney disease  Due to vol depletion and sepsis- Resolved  Chronic medical disease noted on renal ultrasound on last admission   Admit creatinine at 2.4  Home Bactrim initially held for KERMIT, resumed 10/10 qMWF for PJP prophylaxis in steroid dose >20mg    Hyponatremia  Chronic, will continue to monitor    UTI (urinary tract infection)  Sepsis 2/2 UTI, with organ dysfunction - KERMIT on CKD and acute encephalopathy. UA with >100 WBCs.  Interestingly, UCx is negative.  Given her frequent UTIs and possible yield impacted by ABx given prior to admit, continue CTX IV while inpatient to complete 5-7 days total of ABx.  BCx negative  - s/p treatment course with CTX IV      ID  Sepsis  This patient does have evidence of infective focus  My overall impression is sepsis.  Source: Urinary Tract  Antibiotics given-   Antibiotics (72h ago, onward)    Start     Stop Route Frequency Ordered    10/09/23 1845  sulfamethoxazole-trimethoprim 800-160mg per tablet 1 tablet         -- Oral Every Mon, Wed, Fri 10/09/23 1840        Latest lactate reviewed-  No results for input(s): "LACTATE" in the last 72 hours.  Organ dysfunction indicated by Acute kidney injury and Encephalopathy    Fluid challenge: s/p IVFs in ED    Post- resuscitation assessment No - Post resuscitation assessment not needed       Will Not start Pressors- Levophed for MAP of 65  Source control achieved by: ABx    See above    Immunology/Multi System  Immunocompromised state due to drug therapy        Rheumatoid arthritis of multiple sites  Home meds: mycophenolate and hydroxychloroquine for ILD and connective tissue manifestatons of RA  Stress dose steroids (eventually get down to home prednisone 10 mg)  Home plaquenil   Resume home Cellcept with resolution of UTI therapy      Hematology  Anticoagulant " long-term use  - Continue home Eliquis      Thrombocytopenia  Resolved 10/11  monitor      Oncology  Anemia of renal disease  Stable  monitor      GI  PUD (peptic ulcer disease)  Home PPI      Orthopedic  Rheumatic torticollis  - managed with prn gabapentin cream compounded at outside pharmacy, otherwise managed with lidocaine patches      Post-traumatic osteoarthritis of both hips and shoulders  Home meds: gabapentin 100 qbreakfast and dinner, and 300 qhs (helps with sleep); baclofen 5mg qlunch, dinner, and hs  - resumed meds tonight 10/11 since KERMIT resolved at streamlined regimen   - gabapentin 100 TID  - baclofen 5 TID, adjust as warranted (caution for toxicity/overdose)      Other  Generalized weakness  DDx sepsis/UTI, vol depletion, possible adrenal insufficiency vs crisis albeit only taking low dose prednisone (10mg) at home.  Similar presentation to prior, per Dr Pena admitting MD and per chart review;  Associated w/ worsening confusion and decreased po intake  - Tx Sepsis/UTI as above  - fall precautions  - pt/ot        Final Active Diagnoses:    Diagnosis Date Noted POA    PRINCIPAL PROBLEM:  Encephalopathy acute [G93.40]  Yes    Cerebrovascular accident (CVA) due to stenosis of right posterior cerebral artery [I63.531] 10/14/2023 Clinically Undetermined    Occipital cerebral infarction [I63.9] 10/14/2023 Yes    Sepsis [A41.9] 10/10/2023 Yes    Acute kidney injury superimposed on chronic kidney disease [N17.9, N18.9] 10/09/2023 Yes    Anemia of renal disease [N18.9, D63.1] 10/09/2023 Yes    Chronic obstructive pulmonary disease [J44.9] 09/07/2023 Yes    Heart failure with preserved ejection fraction [I50.30] 05/15/2023 Yes    Rheumatic torticollis [M43.6] 03/20/2023 Yes    Vascular Dementia [F01.A0] 01/10/2023 Yes     Chronic    Cerebral infarction due to thrombosis of right middle cerebral artery [I63.311] 06/19/2022 Yes    Immunocompromised state due to drug therapy [D84.821, Z79.899]  01/23/2022 Not Applicable    Generalized weakness [R53.1] 12/30/2021 Yes    Hyponatremia [E87.1] 12/13/2021 Yes    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 [I69.154] 08/03/2021 Not Applicable     Chronic    Anticoagulant long-term use [Z79.01] 07/10/2020 Not Applicable    Spinal stenosis [M48.00] 02/17/2020 Yes    Mood disorder [F39] 01/13/2020 Yes    Mixed hyperlipidemia [E78.2] 10/17/2019 Yes    Post-traumatic osteoarthritis of both hips and shoulders [M16.4] 09/17/2019 Yes     Chronic    UTI (urinary tract infection) [N39.0] 12/29/2018 Yes    Altered mental status [R41.82]  Yes    Thrombocytopenia [D69.6] 12/16/2018 Yes     Chronic    AF (paroxysmal atrial fibrillation) [I48.0] 06/29/2018 Yes     Chronic    Vascular dementia [F01.50] 12/06/2017 Yes    Long QT interval [R94.31] 06/29/2016 Yes    Rheumatoid arthritis of multiple sites [M05.79] 10/21/2015 Yes     Chronic    PUD (peptic ulcer disease) [K27.9] 03/11/2014 Yes     Chronic      Problems Resolved During this Admission:       Discharged Condition: good    Disposition: Skilled Nursing Facility    Follow Up:   Follow-up Information     Génesis Rosario MD. Schedule an appointment as soon as possible for a visit.    Specialty: Internal Medicine  Contact information:  [address]  [place] [address] [phone]                       Patient Instructions:      Ambulatory referral/consult to Vascular Neurology   Standing Status: Future   Referral Priority: Routine Referral Type: Consultation   Referral Reason: Specialty Services Required   Requested Specialty: Vascular Neurology   Number of Visits Requested: 1       Significant Diagnostic Studies: Labs:   CMP   Recent Labs   Lab 10/15/23  1009 10/16/23  0550   * 135*   K 3.5 4.5    106   CO2 15* 21*   GLU 69* 74   BUN 9 11   CREATININE 0.8 0.8   CALCIUM 8.7 8.3*   ANIONGAP 11 8    and CBC   Recent Labs    Lab 10/15/23  1009 10/16/23  0550   WBC 11.48 10.81   HGB 11.9* 9.1*   HCT 39.1 29.2*    168       Pending Diagnostic Studies:     Procedure Component Value Units Date/Time    Basic metabolic panel [[id number]]     Order Status: Sent Lab Status: No result     Specimen: Blood     CBC auto differential [[id number]]     Order Status: Sent Lab Status: No result     Specimen: Blood          Medications:  Reconciled Home Medications:      Medication List      CHANGE how you take these medications    apixaban 5 mg Tab  Commonly known as: ELIQUIS  Take 1 tablet (5 mg total) by mouth 2 (two) times daily.  What changed:   · medication strength  · how much to take     baclofen 5 mg Tab tablet  Commonly known as: LIORESAL  Take 1 tablet (5 mg total) by mouth 3 (three) times daily.  What changed:   · medication strength  · See the new instructions.     gabapentin 100 MG capsule  Commonly known as: NEURONTIN  Take 1 capsule (100 mg total) by mouth 3 (three) times daily.  What changed:   · when to take this  · Another medication with the same name was removed. Continue taking this medication, and follow the directions you see here.     gabapentin 5% baclofen 2% amitriptyline 2% topical cream  Apply topically 3 (three) times daily. Apply to shoulders and neck for torticollis  What changed: additional instructions     QUEtiapine 25 MG Tab  Commonly known as: SEROQUEL  Take 1 tablet (25 mg total) by mouth every evening. Patient may request 1/2 tablet rather than full 25mg nightly.  What changed:   · when to take this  · reasons to take this  · additional instructions        CONTINUE taking these medications    albuterol-ipratropium 2.5 mg-0.5 mg/3 mL nebulizer solution  Commonly known as: DUO-NEB  Take 3 mLs by nebulization 2 (two) times daily as needed for Shortness of Breath. Rescue     atorvastatin 40 MG tablet  Commonly known as: LIPITOR  Take 1 tablet (40 mg total) by mouth every evening.     cholecalciferol (vitamin  D3) 125 mcg (5,000 unit) capsule  Take 1 capsule (5,000 Units total) by mouth once daily.     ferrous sulfate, dried 160 mg (50 mg iron) Tbsr  Commonly known as: SLOW FE  Take 1 tablet (160 mg total) by mouth every other day.     fluticasone-salmeterol 100-50 mcg/dose 100-50 mcg/dose diskus inhaler  Commonly known as: ADVAIR  Inhale 1 puff into the lungs 2 (two) times daily. Controller     hydroxychloroquine 200 mg tablet  Commonly known as: PLAQUENIL  Take 1 tablet (200 mg total) by mouth 2 (two) times daily.     LIDOcaine 5 %  Commonly known as: LIDODERM  Place 1 patch onto the skin once daily. Remove & Discard patch within 12 hours as needed for pain or as directed by MD Place patch onto lower back as needed     magnesium oxide 400 mg (241.3 mg magnesium) tablet  Commonly known as: MAG-OX  Take 400 mg by mouth once daily.     mycophenolate 500 mg Tab  Commonly known as: CELLCEPT  Take 1 tablet (500 mg total) by mouth 2 (two) times daily.     pantoprazole 40 MG tablet  Commonly known as: PROTONIX  Take 1 tablet (40 mg total) by mouth once daily.     predniSONE 10 MG tablet  Commonly known as: DELTASONE  Take 10 mg by mouth once daily.     sulfamethoxazole-trimethoprim 800-160mg 800-160 mg Tab  Commonly known as: BACTRIM DS  One tablet by mouth every Monday, Wednesday, Friday to prevent lung infection     thiamine 100 MG tablet  Take 100 mg by mouth once daily.            Indwelling Lines/Drains at time of discharge:   Lines/Drains/Airways     None                 Time spent on the discharge of patient: 50 minutes         Ramakrishna Gleason MD  Department of Hospital Medicine  [facility] ([facility])

## 2023-10-16 NOTE — PLAN OF CARE
Pt awake and alert. Pleasantly confused. Disoriented to time. VSS on room air. Tolerated scheduled medications well. Turned Q2. Delirium precautions maintained. No acute events this shift. Care ongoing.     Problem: Altered Behavior (Delirium)  Goal: Improved Behavioral Control  Outcome: Ongoing, Progressing     Problem: Sleep Disturbance (Delirium)  Goal: Improved Sleep  Outcome: Ongoing, Progressing     Problem: Adult Inpatient Plan of Care  Goal: Plan of Care Review  Outcome: Ongoing, Progressing  Goal: Absence of Hospital-Acquired Illness or Injury  Outcome: Ongoing, Progressing     Problem: Impaired Wound Healing  Goal: Optimal Wound Healing  Outcome: Ongoing, Progressing     Problem: Skin Injury Risk Increased  Goal: Skin Health and Integrity  Outcome: Ongoing, Progressing     Problem: Fall Injury Risk  Goal: Absence of Fall and Fall-Related Injury  Outcome: Ongoing, Progressing

## 2023-10-16 NOTE — ASSESSMENT & PLAN NOTE
Sepsis 2/2 UTI, with organ dysfunction - KERMIT on CKD and acute encephalopathy. UA with >100 WBCs.  Interestingly, UCx is negative.  Given her frequent UTIs and possible yield impacted by ABx given prior to admit, continue CTX IV while inpatient to complete 5-7 days total of ABx.  BCx negative  - s/p treatment course with CTX IV

## 2023-10-16 NOTE — PLAN OF CARE
NURSING HOME ORDERS    10/16/2023  [facility]  [facility] - NEUROSURGERY (HOSPITAL)  [address] [address]  Dept: [phone]  Loc: [phone]     Admit to Nursing Home:  Skilled Nursing Facility    Diagnoses:  Active Hospital Problems    Diagnosis  POA    *Encephalopathy acute [G93.40]  Yes    Cerebrovascular accident (CVA) due to stenosis of right posterior cerebral artery [I63.531]  Clinically Undetermined    Occipital cerebral infarction [I63.9]  Yes    Sepsis [A41.9]  Yes    Acute kidney injury superimposed on chronic kidney disease [N17.9, N18.9]  Yes    Anemia of renal disease [N18.9, D63.1]  Yes    Chronic obstructive pulmonary disease [J44.9]  Yes    Heart failure with preserved ejection fraction [I50.30]  Yes    Rheumatic torticollis [M43.6]  Yes     Neck pain, headaches related to contracted and spastic cervical spine musculature      Vascular Dementia [F01.A0]  Yes     Chronic     -neurology assessment 7/30/2018, 8/18/2020  -9/8/2017 Neuropsychiatry note Dr. 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  There was confusion concerning the indication for plavix, but this had been recommended in 2018 by cardiology in the setting of L hemichoria thought to be due to lacunar infact in 2017. (2/9/2018 PCP note)  - plavix subsequently stopped (2/5/2020 cariology note, see alson11/20/2019 cardiology note - pat did not want additional AC at that time).   -ASA and brillinta had been recommended by [facility] neurology for vasc dementia - 12/29/2017 note, but brillianta never started.      Spinal stenosis [M48.00]  Yes    Mood disorder [F39]  Yes     --9/8/2017 Neuropsychiatry note Dr. Lagos  -2/7/2018, 3/12/2018 Dr. 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 [M16.4]  Yes     Chronic     Post inflammatory OA of hips      UTI (urinary tract infection) [N39.0]  Yes    Altered mental status [R41.82]  Yes    Thrombocytopenia [D69.6]  Yes     Chronic     -monitor periodically w/ CBC      AF (paroxysmal atrial fibrillation) [I48.0]  Yes     Chronic     -followed by Cardiology, on Eliquis  -PAF noted on admission circa 4/2/2018 for respiratory failure (PNA +/- )      Vascular dementia [F01.50]  Yes    Long QT interval [R94.31]  Yes     Due to medication (plaquenil)       Rheumatoid arthritis of multiple sites [M05.79]  Yes     Chronic     -Dx 2012 with Dr No, then Zakem  HCQ since 2012  Prednisone 5 mg/d since 2012 previously (doses changed at times due to other conditions, pulmonary)  Humira one dose April 2018 followed by ICU admission for pnemonia and resp failure    -Managed by rheumatology        PUD (peptic ulcer disease) [K27.9]  Yes     Chronic      Resolved Hospital Problems   No resolved problems to display.       Patient is homebound due to:  Encephalopathy acute    Allergies:Review of patient's allergies indicates:  No Known Allergies    Vitals:  Routine    Diet: regular diet and fluid consistency thin    Activities:   Activity as tolerated    Goals of Care  Treatment Preferences:  Code Status: Full Code    Living Will: Yes     What is most important right now is to focus on extending life as long as possible, even it it means sacrificing quality, curative/life-prolongation (regardless of treatment burdens).  Accordingly, we have decided that the best plan to meet the patient's goals includes continuing with treatment.      Labs:  per facility protocl    Nursing Precautions:  Aspiration  and Fall    Consults:   PT to evaluate and treat- 5 times a week and OT to evaluate and treat- 5 times a week     Miscellaneous Care: Routine Skin for Bedridden Patients:  Apply moisture barrier cream to all             Medications: Discontinue all previous medication orders, if any. See new list below.     Medication List        CHANGE how you take these medications      apixaban 5 mg Tab  Commonly known as: ELIQUIS  Take 1 tablet (5 mg total) by mouth 2 (two) times daily.  What changed:   medication strength  how much to take     baclofen 5 mg Tab tablet  Commonly known as: LIORESAL  Take 1 tablet (5 mg total) by mouth 3 (three) times daily.  What changed:   medication strength  See the new instructions.     gabapentin 100 MG capsule  Commonly known as: NEURONTIN  Take 1 capsule (100 mg total) by mouth 3 (three) times daily.  What changed:   when to take this  Another medication with the same name was removed. Continue taking this medication, and follow the directions you see here.     gabapentin 5% baclofen 2% amitriptyline 2% topical cream  Apply topically 3 (three) times daily. Apply to shoulders and neck for torticollis  What changed: additional instructions     QUEtiapine 25 MG Tab  Commonly known as: SEROQUEL  Take 1 tablet (25 mg total) by mouth every evening. Patient may request 1/2 tablet rather than full 25mg nightly.  What changed:   when to take this  reasons to take this  additional instructions            CONTINUE taking these medications      albuterol-ipratropium 2.5  mg-0.5 mg/3 mL nebulizer solution  Commonly known as: DUO-NEB  Take 3 mLs by nebulization 2 (two) times daily as needed for Shortness of Breath. Rescue     atorvastatin 40 MG tablet  Commonly known as: LIPITOR  Take 1 tablet (40 mg total) by mouth every evening.     cholecalciferol (vitamin D3) 125 mcg (5,000 unit) capsule  Take 1 capsule (5,000 Units total) by mouth once daily.     ferrous sulfate, dried 160 mg (50 mg iron) Tbsr  Commonly known as: SLOW FE  Take 1 tablet (160 mg total) by mouth every other day.     fluticasone-salmeterol 100-50 mcg/dose 100-50 mcg/dose diskus inhaler  Commonly known as: ADVAIR  Inhale 1 puff into the lungs 2 (two) times daily. Controller     hydroxychloroquine 200 mg tablet  Commonly known as: PLAQUENIL  Take 1 tablet (200 mg total) by mouth 2 (two) times daily.     LIDOcaine 5 %  Commonly known as: LIDODERM  Place 1 patch onto the skin once daily. Remove & Discard patch within 12 hours as needed for pain or as directed by MD Place patch onto lower back as needed     magnesium oxide 400 mg (241.3 mg magnesium) tablet  Commonly known as: MAG-OX  Take 400 mg by mouth once daily.     mycophenolate 500 mg Tab  Commonly known as: CELLCEPT  Take 1 tablet (500 mg total) by mouth 2 (two) times daily.     pantoprazole 40 MG tablet  Commonly known as: PROTONIX  Take 1 tablet (40 mg total) by mouth once daily.     predniSONE 10 MG tablet  Commonly known as: DELTASONE  Take 10 mg by mouth once daily.     sulfamethoxazole-trimethoprim 800-160mg 800-160 mg Tab  Commonly known as: BACTRIM DS  One tablet by mouth every Monday, Wednesday, Friday to prevent lung infection     thiamine 100 MG tablet  Take 100 mg by mouth once daily.                Immunizations Administered as of 10/16/2023       Name Date Dose VIS Date Route Exp Date    COVID-19, MRNA, LN-S, PF (Pfizer) (Purple Cap) 9/10/2021 10:14 AM 0.3 mL 12/12/2020 Intramuscular 10/31/2021    Site: Left deltoid     Given By: Cely Valencia  RN     : [employer]     Lot: [id number]     COVID-19, MRNA, LN-S, PF (Pfizer) (Purple Cap) 1/30/2021  2:24 PM 0.3 mL 12/12/2020 Intramuscular 5/31/2021    Site: Right deltoid     Given By: Jonah Pierre MA     : [employer]     Lot: [id number]     COVID-19, MRNA, LN-S, PF (Pfizer) (Purple Cap) 1/9/2021  1:40 PM 0.3 mL 12/12/2020 Intramuscular 4/30/2021    Site: Left arm     Given By: Isaac Rubio RN     : [employer]     Lot: [id number]             This patient has had both covid vaccinations    Some patients may experience side effects after vaccination.  These may include fever, headache, muscle or joint aches.  Most symptoms resolve with 24-48 hours and do not require urgent medical evaluation unless they persist for more than 72 hours or symptoms are concerning for an unrelated medical condition.          _________________________________  Karen Mcginnis MD  10/16/2023

## 2023-10-16 NOTE — SUBJECTIVE & OBJECTIVE
Interval History: NAEON, patient reports no new symptoms. Pleasantly confused.    Review of Systems   Constitutional:  Negative for activity change, appetite change and fever.   HENT:  Negative for congestion.    Respiratory:  Positive for cough (chronic). Negative for shortness of breath and wheezing.    Cardiovascular:  Negative for chest pain and leg swelling.   Gastrointestinal:  Negative for abdominal pain, constipation, diarrhea, nausea and vomiting.   Skin:  Negative for rash.   Neurological:  Negative for headaches.   Psychiatric/Behavioral:  Negative for behavioral problems.      Objective:     Vital Signs (Most Recent):  Temp: 98.1 °F (36.7 °C) (10/16/23 1125)  Pulse: 82 (10/16/23 1451)  Resp: 14 (10/16/23 1125)  BP: (!) 133/59 (10/16/23 1125)  SpO2: 96 % (10/16/23 1125) Vital Signs (24h Range):  Temp:  [97 °F (36.1 °C)-98.9 °F (37.2 °C)] 98.1 °F (36.7 °C)  Pulse:  [67-90] 82  Resp:  [14-18] 14  SpO2:  [96 %-99 %] 96 %  BP: (132-155)/(59-76) 133/59     Weight: 71.7 kg (158 lb)  Body mass index is 27.12 kg/m².  No intake or output data in the 24 hours ending 10/16/23 1502      Physical Exam  Vitals and nursing note reviewed.   HENT:      Head: Normocephalic and atraumatic.      Mouth/Throat:      Mouth: Mucous membranes are moist.      Pharynx: Oropharynx is clear.   Eyes:      Extraocular Movements: Extraocular movements intact.      Conjunctiva/sclera: Conjunctivae normal.   Cardiovascular:      Rate and Rhythm: Normal rate and regular rhythm.      Pulses: Normal pulses.   Pulmonary:      Effort: Pulmonary effort is normal. No respiratory distress.      Breath sounds: No wheezing or rales.   Abdominal:      Palpations: Abdomen is soft.      Tenderness: There is no abdominal tenderness. There is no guarding.   Musculoskeletal:      Right lower leg: No edema.      Left lower leg: No edema.   Skin:     General: Skin is warm and dry.   Neurological:      General: No focal deficit present.      Mental Status:  She is alert. She is disoriented.      Comments: Baseline disorientation   Psychiatric:         Mood and Affect: Mood normal.             Significant Labs: All pertinent labs within the past 24 hours have been reviewed.    Significant Imaging: I have reviewed all pertinent imaging results/findings within the past 24 hours.

## 2023-10-16 NOTE — ASSESSMENT & PLAN NOTE
DDx sepsis/UTI, vol depletion, possible adrenal insufficiency vs crisis albeit only taking low dose prednisone (10mg) at home.  Similar presentation to prior, per Dr Pena admitting MD and per chart review;  Associated w/ worsening confusion and decreased po intake  - Tx Sepsis/UTI as above  - fall precautions  - pt/ot

## 2023-10-16 NOTE — ASSESSMENT & PLAN NOTE
Tx underlying sepsis/UTI  - monitor closely. improving  - minimize CNS-active meds  - no improvement on ABx  - EEG negative for seizure but showing moderate to severe diffuse slowing  - MRI brain showing acute right occipital infarct - unlikely associated with symptoms. Neurology consult placed.   - For possible med withdrawal, resumed gabapentin and baclofen

## 2023-10-16 NOTE — PLAN OF CARE
[facility] - Neurosurgery (Hospital)  Discharge Final Note    SW observed d/c orders for pt. Ordered PFC transport via ambulance to [facility] for today at 1530. Report can be called to [phone] and pt will go to room 149. SW informed pt/family of transport ETA and they are agreeable. SW left printed transport packet at nurse's station with unit secretary. All questions answered.    Primary Care Provider: Génesis Rosario MD    Expected Discharge Date: 10/16/2023    Final Discharge Note (most recent)       Final Note - 10/16/23 1446          Final Note    Assessment Type Final Discharge Note (P)      Anticipated Discharge Disposition Skilled Nursing Facility (P)      What phone number can be called within the next 1-3 days to see how you are doing after discharge? [phone] (P)         Post-Acute Status    Post-Acute Authorization Placement (P)      Post-Acute Placement Status Set-up Complete/Auth obtained (P)      Coverage MEDICARE - MEDICARE PART A & B (P)      Discharge Delays None known at this time (P)                      Important Message from Medicare  Important Message from Medicare regarding Discharge Appeal Rights: Given to patient/caregiver, Explained to patient/caregiver, Signed/date by patient/caregiver     Date IMM was signed: 10/16/23  Time IMM was signed: 1127    Contact Info       Génesis Rosario MD   Specialty: Internal Medicine   Relationship: PCP - General    Kathleen LYONS  [facility] [address]   Phone: [phone]       Next Steps: Schedule an appointment as soon as possible for a visit          KLAI Hu, CSW    Case Management Department

## 2023-10-16 NOTE — PLAN OF CARE
SW sent updated clinicals including PASRR, 142, MAR, CXR, PPD, and SNF orders to [facility] via [facility]. COVID test still pending. Per facility staff, they will give number for report and room number once all required documents are received. Will continue to follow for needs.    KALI Hu, CSW    Case Management Department

## 2023-10-16 NOTE — ASSESSMENT & PLAN NOTE
Home meds: mycophenolate and hydroxychloroquine for ILD and connective tissue manifestatons of RA  Stress dose steroids (eventually get down to home prednisone 10 mg)  Home plaquenil   Resume home Cellcept with resolution of UTI therapy

## 2023-10-16 NOTE — PT/OT/SLP PROGRESS
"Occupational Therapy   Co-treatment    Name: Selma Lux  MRN: [id number]  Admitting Diagnosis:  Encephalopathy acute       Recommendations:     Discharge Recommendations: Low Intensity Therapy  Discharge Equipment Recommendations:  none  Barriers to discharge:  Other (Comment) (increased skilled (A) required)    Assessment:     Selma Lux is a 88 y.o. female with a medical diagnosis of Encephalopathy acute.  She presents with the following performance deficits affecting function are weakness, impaired endurance, impaired self care skills, impaired functional mobility, gait instability, decreased lower extremity function, decreased upper extremity function, impaired coordination, impaired fine motor, impaired cardiopulmonary response to activity, impaired balance, decreased safety awareness, impaired cognition. Pt demo improved functional endurance with all mobility and activities this date. She was able to sit EOB to participate in grooming tasks and perform sit to stand transfers, however continues to require assistance 2/2 global deconditioning and L-sided deficits. Pt would continue to benefit from skilled acute OT services to address the above mentioned deficits and decrease caregiver burden.     Rehab Prognosis:  Good; patient would benefit from acute skilled OT services to address these deficits and reach maximum level of function.       Plan:     Patient to be seen 3 x/week to address the above listed problems via self-care/home management, therapeutic activities, therapeutic exercises, neuromuscular re-education  Plan of Care Expires: 11/09/23  Plan of Care Reviewed with: patient    Subjective   "Talk to y'all later"   Chief Complaint: None stated   Patient/Family Comments/goals: Return to PLOF  Pain/Comfort:  Pain Rating 1: other (see comments) (did not rate)  Location 1: sacral spine  Pain Addressed 1: Reposition, Distraction, Cessation of Activity  Pain Rating Post-Intervention 1: other " (see comments) (not reported)    Objective:     Communicated with: EN prior to session.  Patient found HOB elevated with bed alarm, telemetry, SCD, peripheral IV upon OT entry to room.    General Precautions: Standard, fall, aspiration    Orthopedic Precautions:N/A  Braces: N/A  Respiratory Status: Room air     Occupational Performance:     Bed Mobility:    Patient completed Supine to Sit with maximal assistance  Patient completed Sit to Supine with maximal assistance     Functional Mobility/Transfers:  Patient completed Sit <> Stand Transfer with maximal assistance and of 2 persons  with  no assistive device    Pt required verbal cueing for facilitating trunk and hip extension for upright posture. Able to tolerate ~10 sec at best this date.   Functional Mobility: deferred 2/2 poor static standing tolerance     Activities of Daily Living:  Grooming: minimum assistance for dental hygiene (dentures and brushing teeth)   Upper Body Dressing: moderate assistance to don/doff gown   Lower Body Dressing: moderate assistance to don socks while sitting EOB       AMPA 6 Click ADL: 12    Treatment & Education:  Pt sat EOB for ~12 min with SBA-Min A for sitting balance 2/2 decreased postural control and overall safety awareness.   Tolerated WB through LUE while sitting also performed some therex w/PT    Pt and caregiver educated on:   Role of OT, POC, and d/c planning.   Safe transfer techniques and proper body mechanics for fall prevention and improved independence with functional transfers   Importance of OOB activities to increase endurance and tolerance for increased participation in daily ADLs.   Utilizing the call bell to request for assistance with all functional mobility to ensure safety during hospital stay.      Pt/ and caregiver verbalized understanding and all questions were addressed within the scope of OT.       Patient left HOB elevated with all lines intact, call button in reach, RN notified, and caregiver  present    GOALS:   Multidisciplinary Problems       Occupational Therapy Goals          Problem: Occupational Therapy    Goal Priority Disciplines Outcome Interventions   Occupational Therapy Goal     OT, PT/OT Ongoing, Progressing    Description: Goals set on 10/10, with expiration date 11/9:  Patient will increase functional independence with ADLs by performing:    Bed mobility with Mod A  Grooming while seated EOB with Mod A  UB Dressing with Mod A.  LB Dressing with Mod A.  Toileting from bedside commode with Mod A for hygiene and clothing management.   Stand pivot transfers with Max A using AD as needed  Pt will demonstrate understanding of education provided regarding energy conservation and task modification through teach-back method.  Pt will demonstrate [place] in HEP for BUE strengthening GM/FM exercises to improve functional performance.                            Time Tracking:     OT Date of Treatment: 10/16/23  OT Start Time: 1017  OT Stop Time: 1042  OT Total Time (min): 25 min    Billable Minutes:Self Care/Home Management 12  Neuromuscular Re-education 12    OT/GENE: OT          10/16/2023   Co-treatment performed due to patient's multiple deficits requiring two skilled therapists to appropriately and safely assess and advance patient's strength, endurance, functional mobility, and ADL performance while facilitating functional tasks in addition to accommodating for patient's activity tolerance and medical acuity.

## 2023-10-18 NOTE — PROGRESS NOTES
Protocol: [facility] Effectiveness Study  Identifier: [id number]  IRB#: [id number]  PI: Dr. Adrien Lagos, PhD  CO-I: Dr. Laura Waller PsyD  Version Date: 12/05/2022  Pt Study ID: [id number]  Visit Month: M7  Care Plan documented on: (4/10/23) - Please refer to note for more information.      Timeline:   (*Note for CTN - complete timeline using completed or planned date for study events. Add any important events (i.e. Withdrawals, Lost to Follow Up, Adverse Events, etc.).     Consent: Completed(4/5/23)  Baseline questionnaires: Completed(4/5/23)  6-month questionnaires: Planned(9/2023)  12-month questionnaires: Planned(3/2024)        Visit Note:   SW spoke with Madhavi, pt's daughter for Month 7 visit. --Pt was recently d/c from hospital to SNF at [facility].  The family is pleased with this placement and will consider detention placement as needed.     Falls in the past month:   UTIs in the past month: 0  Hospital encounters in the past month (reported by caregiver): 0        Next monthly visit scheduled for: 11/20/23. Caregiver knows how to reach CTN, and is encouraged to do so, as needed before our next scheduled call.      Action items for CTN: continue to follow

## 2023-11-09 NOTE — TELEPHONE ENCOUNTER
[facility] Family Meeting  Dr Rosario (Attending)  Attendees: Patients daughters (Susu Rosado and Madhavi RosadoHilaria)        Identified Barriers:   Family understanding of dementia  Declining health  Placement in facility  Financial issues    Recommendations and Plan:        Problem List Items Addressed This Visit          Immunology/Multi System    Immunocompromised state due to drug therapy - Primary    Relevant Orders    E-Consult to Infectious Disease     Other Visit Diagnoses       Cryptogenic organizing pneumonia        Relevant Orders    E-Consult to Infectious Disease          Family also sent detailed Aleah message on UTI prevention and dementia caregiver resources    Génesis Rosario MD/MPH  [facility]  [phone] spectralink

## 2023-11-17 NOTE — PROGRESS NOTES
In an attempt to complete monthly [facility] [employer] check-in, EBONIE LVM for CG and remains available.  EBONIE will make second attempt 11/27/23.

## 2023-11-27 NOTE — PROGRESS NOTES
In an attempt to complete monthly [facility] [employer] check-in, EBONIE LVM for CG and remains available.  EBONIE will make third attempt 11/28/23

## 2023-11-28 NOTE — PROGRESS NOTES
Protocol: [facility] [employer] Effectiveness Study  Identifier: [id number]  IRB#: [id number]  PI: Dr. Adrien Lagos, PhD  CO-I: Dr. Laura Waller PsyD  Version Date: 12/05/2022  Pt Study ID: [id number]  Visit Month: M8  Care Plan documented on: (4/10/23) - Please refer to note for more information.      Timeline:   (*Note for CTN - complete timeline using completed or planned date for study events. Add any important events (i.e. Withdrawals, Lost to Follow Up, Adverse Events, etc.).     Consent: Completed(4/5/23)  Baseline questionnaires: Completed(4/5/23)  6-month questionnaires: Planned(9/2023)  12-month questionnaires: Planned(3/2024)        Visit Note:   SW spoke with Madhavi, pt's daughter for Month 8 visit. --Pt's SNF days are about to run out.  CG and her sister are applying for FPC care at pt's current nursing home. SW provided supportive counseling and education related to NH application and FPC placement.      Falls in the past month: 0  UTIs in the past month: 1  Hospital encounters in the past month (reported by caregiver): 0        Next monthly visit scheduled for: 12/21/23. Caregiver knows how to reach CTN, and is encouraged to do so, as needed before our next scheduled call.      Action items for CTN: continue to follow

## 2023-12-16 NOTE — PT/OT/SLP PROGRESS
Occupational Therapy      Patient Name:  Selma Lux MD   MRN:  [id number]    Patient not seen today secondary to failed MOVE screeen  . Will follow-up at a later date.    AYAD Holguin  4/17/2016   Home

## 2023-12-21 NOTE — PROGRESS NOTES
In an attempt to complete monthly [facility] [employer] check-in, EBONIE LVM for CG and remains available.  EBONIE will make second attempt 2/27/23

## 2023-12-27 NOTE — PROGRESS NOTES
In an attempt to complete monthly [facility] [employer] check-in, EBONIE LVM for CG and remains available.  EBONIE will make third attempt 1/5/24

## 2023-12-30 NOTE — TELEPHONE ENCOUNTER
----- Message from Amador Macdonald sent at 7/17/2018  3:18 PM CDT -----  Contact: [facility]/ Ty [phone]  Is this a refill or new RX:  New  RX name and strength: metoprolol tartrate (LOPRESSOR) 25 MG tablet     Pharmacy name and phone # [phone] - [place] [address] [place] [facility] [place] [phone] (Phone) [phone] (Fax)     Comments:  The said the never received this         DISPLAY PLAN FREE TEXT DISPLAY PLAN FREE TEXT DISPLAY PLAN FREE TEXT

## 2024-01-01 ENCOUNTER — OFFICE VISIT (OUTPATIENT)
Dept: RHEUMATOLOGY | Facility: CLINIC | Age: 89
DRG: 871 | End: 2024-01-01
Payer: MEDICARE

## 2024-01-01 ENCOUNTER — OUTPATIENT CASE MANAGEMENT (OUTPATIENT)
Dept: NEUROLOGY | Facility: CLINIC | Age: 89
End: 2024-01-01
Payer: MEDICARE

## 2024-01-01 ENCOUNTER — PATIENT MESSAGE (OUTPATIENT)
Dept: NEUROLOGY | Facility: HOSPITAL | Age: 89
End: 2024-01-01
Payer: MEDICARE

## 2024-01-01 ENCOUNTER — HOSPITAL ENCOUNTER (INPATIENT)
Facility: HOSPITAL | Age: 89
LOS: 7 days | Discharge: HOSPICE | DRG: 871 | End: 2024-02-05
Attending: EMERGENCY MEDICINE | Admitting: FAMILY MEDICINE
Payer: MEDICARE

## 2024-01-01 VITALS
TEMPERATURE: 98 F | HEIGHT: 64 IN | HEART RATE: 85 BPM | BODY MASS INDEX: 26.98 KG/M2 | WEIGHT: 158 LBS | SYSTOLIC BLOOD PRESSURE: 152 MMHG | OXYGEN SATURATION: 100 % | DIASTOLIC BLOOD PRESSURE: 65 MMHG | RESPIRATION RATE: 19 BRPM

## 2024-01-01 VITALS — BODY MASS INDEX: 26.98 KG/M2 | HEIGHT: 64 IN | WEIGHT: 158.06 LBS

## 2024-01-01 DIAGNOSIS — R09.02 HYPOXIA: ICD-10-CM

## 2024-01-01 DIAGNOSIS — I69.154 HEMIPLEGIA AND HEMIPARESIS FOLLOWING NONTRAUMATIC INTRACEREBRAL HEMORRHAGE AFFECTING LEFT NON-DOMINANT SIDE: Chronic | ICD-10-CM

## 2024-01-01 DIAGNOSIS — D69.6 THROMBOCYTOPENIA: Chronic | ICD-10-CM

## 2024-01-01 DIAGNOSIS — M05.79 SEROPOSITIVE RHEUMATOID ARTHRITIS OF MULTIPLE SITES: Chronic | ICD-10-CM

## 2024-01-01 DIAGNOSIS — R13.12 OROPHARYNGEAL DYSPHAGIA: ICD-10-CM

## 2024-01-01 DIAGNOSIS — F01.A0 MILD VASCULAR DEMENTIA WITHOUT BEHAVIORAL DISTURBANCE, PSYCHOTIC DISTURBANCE, MOOD DISTURBANCE, OR ANXIETY: Chronic | ICD-10-CM

## 2024-01-01 DIAGNOSIS — I50.32 CHRONIC HEART FAILURE WITH PRESERVED EJECTION FRACTION: ICD-10-CM

## 2024-01-01 DIAGNOSIS — I50.30 HEART FAILURE WITH PRESERVED EJECTION FRACTION, UNSPECIFIED HF CHRONICITY: ICD-10-CM

## 2024-01-01 DIAGNOSIS — I48.0 AF (PAROXYSMAL ATRIAL FIBRILLATION): Chronic | ICD-10-CM

## 2024-01-01 DIAGNOSIS — J96.01 ACUTE RESPIRATORY FAILURE WITH HYPOXIA: ICD-10-CM

## 2024-01-01 DIAGNOSIS — E04.2 MULTINODULAR GOITER: ICD-10-CM

## 2024-01-01 DIAGNOSIS — J90 PLEURAL EFFUSION: Chronic | ICD-10-CM

## 2024-01-01 DIAGNOSIS — J18.9 SEPSIS DUE TO PNEUMONIA: Primary | ICD-10-CM

## 2024-01-01 DIAGNOSIS — R31.9 URINARY TRACT INFECTION WITH HEMATURIA, SITE UNSPECIFIED: ICD-10-CM

## 2024-01-01 DIAGNOSIS — Z79.899 IMMUNOCOMPROMISED STATE DUE TO DRUG THERAPY: ICD-10-CM

## 2024-01-01 DIAGNOSIS — N39.0 URINARY TRACT INFECTION WITH HEMATURIA, SITE UNSPECIFIED: ICD-10-CM

## 2024-01-01 DIAGNOSIS — Z51.5 PALLIATIVE CARE ENCOUNTER: ICD-10-CM

## 2024-01-01 DIAGNOSIS — Z79.899 HIGH RISK MEDICATION USE: Chronic | ICD-10-CM

## 2024-01-01 DIAGNOSIS — J44.9 COPD WITHOUT EXACERBATION: Chronic | ICD-10-CM

## 2024-01-01 DIAGNOSIS — R07.9 CHEST PAIN: ICD-10-CM

## 2024-01-01 DIAGNOSIS — M05.79 SEROPOSITIVE RHEUMATOID ARTHRITIS OF MULTIPLE SITES: Primary | ICD-10-CM

## 2024-01-01 DIAGNOSIS — A41.9 SEPSIS DUE TO PNEUMONIA: Primary | ICD-10-CM

## 2024-01-01 DIAGNOSIS — Z71.89 ACP (ADVANCE CARE PLANNING): ICD-10-CM

## 2024-01-01 DIAGNOSIS — D84.821 IMMUNOCOMPROMISED STATE DUE TO DRUG THERAPY: ICD-10-CM

## 2024-01-01 DIAGNOSIS — D64.9 ANEMIA, UNSPECIFIED TYPE: ICD-10-CM

## 2024-01-01 LAB
ACANTHOCYTES BLD QL SMEAR: PRESENT
ALBUMIN SERPL BCP-MCNC: 2.4 G/DL (ref 3.5–5.2)
ALBUMIN SERPL BCP-MCNC: 2.5 G/DL (ref 3.5–5.2)
ALBUMIN SERPL BCP-MCNC: 2.5 G/DL (ref 3.5–5.2)
ALBUMIN SERPL BCP-MCNC: 2.6 G/DL (ref 3.5–5.2)
ALBUMIN SERPL BCP-MCNC: 2.6 G/DL (ref 3.5–5.2)
ALBUMIN SERPL BCP-MCNC: 2.9 G/DL (ref 3.5–5.2)
ALLENS TEST: ABNORMAL
ALLENS TEST: ABNORMAL
ALLENS TEST: NORMAL
ALP SERPL-CCNC: 47 U/L (ref 55–135)
ALP SERPL-CCNC: 51 U/L (ref 55–135)
ALP SERPL-CCNC: 53 U/L (ref 55–135)
ALP SERPL-CCNC: 55 U/L (ref 55–135)
ALP SERPL-CCNC: 56 U/L (ref 55–135)
ALP SERPL-CCNC: 57 U/L (ref 55–135)
ALT SERPL W/O P-5'-P-CCNC: 10 U/L (ref 10–44)
ALT SERPL W/O P-5'-P-CCNC: 12 U/L (ref 10–44)
ALT SERPL W/O P-5'-P-CCNC: 5 U/L (ref 10–44)
ALT SERPL W/O P-5'-P-CCNC: 7 U/L (ref 10–44)
ALT SERPL W/O P-5'-P-CCNC: 8 U/L (ref 10–44)
ALT SERPL W/O P-5'-P-CCNC: 9 U/L (ref 10–44)
ANION GAP SERPL CALC-SCNC: 10 MMOL/L (ref 8–16)
ANION GAP SERPL CALC-SCNC: 14 MMOL/L (ref 8–16)
ANION GAP SERPL CALC-SCNC: 7 MMOL/L (ref 8–16)
ANION GAP SERPL CALC-SCNC: 9 MMOL/L (ref 8–16)
ANISOCYTOSIS BLD QL SMEAR: ABNORMAL
ANISOCYTOSIS BLD QL SMEAR: SLIGHT
APTT PPP: 30.8 SEC (ref 21–32)
APTT PPP: 32.5 SEC (ref 21–32)
APTT PPP: 33.8 SEC (ref 21–32)
APTT PPP: 34.2 SEC (ref 21–32)
APTT PPP: 36.4 SEC (ref 21–32)
APTT PPP: 36.7 SEC (ref 21–32)
APTT PPP: 37.6 SEC (ref 21–32)
APTT PPP: 50.4 SEC (ref 21–32)
APTT PPP: 54.7 SEC (ref 21–32)
APTT PPP: 59.5 SEC (ref 21–32)
APTT PPP: 61.9 SEC (ref 21–32)
APTT PPP: 62.6 SEC (ref 21–32)
APTT PPP: 77.4 SEC (ref 21–32)
AST SERPL-CCNC: 10 U/L (ref 10–40)
AST SERPL-CCNC: 10 U/L (ref 10–40)
AST SERPL-CCNC: 11 U/L (ref 10–40)
AST SERPL-CCNC: 18 U/L (ref 10–40)
AST SERPL-CCNC: 8 U/L (ref 10–40)
AST SERPL-CCNC: 9 U/L (ref 10–40)
BACTERIA #/AREA URNS AUTO: ABNORMAL /HPF
BACTERIA BLD CULT: NORMAL
BACTERIA BLD CULT: NORMAL
BACTERIA UR CULT: ABNORMAL
BACTERIA UR CULT: ABNORMAL
BASOPHILS # BLD AUTO: 0.01 K/UL (ref 0–0.2)
BASOPHILS # BLD AUTO: 0.02 K/UL (ref 0–0.2)
BASOPHILS # BLD AUTO: 0.02 K/UL (ref 0–0.2)
BASOPHILS # BLD AUTO: 0.03 K/UL (ref 0–0.2)
BASOPHILS # BLD AUTO: 0.04 K/UL (ref 0–0.2)
BASOPHILS # BLD AUTO: 0.05 K/UL (ref 0–0.2)
BASOPHILS NFR BLD: 0.1 % (ref 0–1.9)
BASOPHILS NFR BLD: 0.2 % (ref 0–1.9)
BASOPHILS NFR BLD: 0.3 % (ref 0–1.9)
BILIRUB SERPL-MCNC: 1.1 MG/DL (ref 0.1–1)
BILIRUB SERPL-MCNC: 1.1 MG/DL (ref 0.1–1)
BILIRUB SERPL-MCNC: 1.3 MG/DL (ref 0.1–1)
BILIRUB SERPL-MCNC: 1.4 MG/DL (ref 0.1–1)
BILIRUB SERPL-MCNC: 1.6 MG/DL (ref 0.1–1)
BILIRUB SERPL-MCNC: 1.6 MG/DL (ref 0.1–1)
BILIRUB UR QL STRIP: NEGATIVE
BNP SERPL-MCNC: 176 PG/ML (ref 0–99)
BUN SERPL-MCNC: 11 MG/DL (ref 8–23)
BUN SERPL-MCNC: 13 MG/DL (ref 8–23)
BUN SERPL-MCNC: 14 MG/DL (ref 8–23)
BUN SERPL-MCNC: 17 MG/DL (ref 8–23)
BUN SERPL-MCNC: 18 MG/DL (ref 8–23)
BUN SERPL-MCNC: 23 MG/DL (ref 8–23)
BURR CELLS BLD QL SMEAR: ABNORMAL
CALCIUM SERPL-MCNC: 8.4 MG/DL (ref 8.7–10.5)
CALCIUM SERPL-MCNC: 8.5 MG/DL (ref 8.7–10.5)
CALCIUM SERPL-MCNC: 8.5 MG/DL (ref 8.7–10.5)
CALCIUM SERPL-MCNC: 8.6 MG/DL (ref 8.7–10.5)
CALCIUM SERPL-MCNC: 8.6 MG/DL (ref 8.7–10.5)
CALCIUM SERPL-MCNC: 8.7 MG/DL (ref 8.7–10.5)
CHLORIDE SERPL-SCNC: 105 MMOL/L (ref 95–110)
CHLORIDE SERPL-SCNC: 109 MMOL/L (ref 95–110)
CHLORIDE SERPL-SCNC: 111 MMOL/L (ref 95–110)
CHLORIDE SERPL-SCNC: 112 MMOL/L (ref 95–110)
CLARITY UR REFRACT.AUTO: ABNORMAL
CO2 SERPL-SCNC: 14 MMOL/L (ref 23–29)
CO2 SERPL-SCNC: 18 MMOL/L (ref 23–29)
CO2 SERPL-SCNC: 19 MMOL/L (ref 23–29)
CO2 SERPL-SCNC: 20 MMOL/L (ref 23–29)
COLOR UR AUTO: YELLOW
CREAT SERPL-MCNC: 0.8 MG/DL (ref 0.5–1.4)
CREAT SERPL-MCNC: 0.8 MG/DL (ref 0.5–1.4)
CREAT SERPL-MCNC: 0.9 MG/DL (ref 0.5–1.4)
CREAT SERPL-MCNC: 1 MG/DL (ref 0.5–1.4)
DACRYOCYTES BLD QL SMEAR: ABNORMAL
DIFFERENTIAL METHOD BLD: ABNORMAL
EOSINOPHIL # BLD AUTO: 0.1 K/UL (ref 0–0.5)
EOSINOPHIL # BLD AUTO: 0.2 K/UL (ref 0–0.5)
EOSINOPHIL # BLD AUTO: 0.3 K/UL (ref 0–0.5)
EOSINOPHIL NFR BLD: 0.6 % (ref 0–8)
EOSINOPHIL NFR BLD: 0.7 % (ref 0–8)
EOSINOPHIL NFR BLD: 0.8 % (ref 0–8)
EOSINOPHIL NFR BLD: 1 % (ref 0–8)
EOSINOPHIL NFR BLD: 1 % (ref 0–8)
EOSINOPHIL NFR BLD: 1.3 % (ref 0–8)
EOSINOPHIL NFR BLD: 1.7 % (ref 0–8)
EOSINOPHIL NFR BLD: 1.8 % (ref 0–8)
EOSINOPHIL NFR BLD: 2 % (ref 0–8)
EOSINOPHIL NFR BLD: 2.3 % (ref 0–8)
ERYTHROCYTE [DISTWIDTH] IN BLOOD BY AUTOMATED COUNT: 18.2 % (ref 11.5–14.5)
ERYTHROCYTE [DISTWIDTH] IN BLOOD BY AUTOMATED COUNT: 18.2 % (ref 11.5–14.5)
ERYTHROCYTE [DISTWIDTH] IN BLOOD BY AUTOMATED COUNT: 18.4 % (ref 11.5–14.5)
ERYTHROCYTE [DISTWIDTH] IN BLOOD BY AUTOMATED COUNT: 18.5 % (ref 11.5–14.5)
ERYTHROCYTE [DISTWIDTH] IN BLOOD BY AUTOMATED COUNT: 18.6 % (ref 11.5–14.5)
ERYTHROCYTE [DISTWIDTH] IN BLOOD BY AUTOMATED COUNT: 18.7 % (ref 11.5–14.5)
ERYTHROCYTE [DISTWIDTH] IN BLOOD BY AUTOMATED COUNT: 18.8 % (ref 11.5–14.5)
EST. GFR  (NO RACE VARIABLE): 54.9 ML/MIN/1.73 M^2
EST. GFR  (NO RACE VARIABLE): >60 ML/MIN/1.73 M^2
GLUCOSE SERPL-MCNC: 108 MG/DL (ref 70–110)
GLUCOSE SERPL-MCNC: 55 MG/DL (ref 70–110)
GLUCOSE SERPL-MCNC: 61 MG/DL (ref 70–110)
GLUCOSE SERPL-MCNC: 73 MG/DL (ref 70–110)
GLUCOSE SERPL-MCNC: 76 MG/DL (ref 70–110)
GLUCOSE SERPL-MCNC: 81 MG/DL (ref 70–110)
GLUCOSE UR QL STRIP: NEGATIVE
HCO3 UR-SCNC: 17 MMOL/L (ref 24–28)
HCT VFR BLD AUTO: 24.3 % (ref 37–48.5)
HCT VFR BLD AUTO: 25.3 % (ref 37–48.5)
HCT VFR BLD AUTO: 26 % (ref 37–48.5)
HCT VFR BLD AUTO: 26.1 % (ref 37–48.5)
HCT VFR BLD AUTO: 26.9 % (ref 37–48.5)
HCT VFR BLD AUTO: 27 % (ref 37–48.5)
HCT VFR BLD AUTO: 27.2 % (ref 37–48.5)
HCT VFR BLD AUTO: 27.4 % (ref 37–48.5)
HCT VFR BLD AUTO: 27.9 % (ref 37–48.5)
HCT VFR BLD AUTO: 31.1 % (ref 37–48.5)
HCV AB SERPL QL IA: NORMAL
HGB BLD-MCNC: 7.5 G/DL (ref 12–16)
HGB BLD-MCNC: 7.7 G/DL (ref 12–16)
HGB BLD-MCNC: 7.8 G/DL (ref 12–16)
HGB BLD-MCNC: 8 G/DL (ref 12–16)
HGB BLD-MCNC: 8.1 G/DL (ref 12–16)
HGB BLD-MCNC: 8.3 G/DL (ref 12–16)
HGB BLD-MCNC: 8.4 G/DL (ref 12–16)
HGB BLD-MCNC: 8.7 G/DL (ref 12–16)
HGB BLD-MCNC: 9.4 G/DL (ref 12–16)
HGB UR QL STRIP: ABNORMAL
HIV 1+2 AB+HIV1 P24 AG SERPL QL IA: NORMAL
HYALINE CASTS UR QL AUTO: 0 /LPF
HYPOCHROMIA BLD QL SMEAR: ABNORMAL
IMM GRANULOCYTES # BLD AUTO: 0.09 K/UL (ref 0–0.04)
IMM GRANULOCYTES # BLD AUTO: 0.11 K/UL (ref 0–0.04)
IMM GRANULOCYTES # BLD AUTO: 0.11 K/UL (ref 0–0.04)
IMM GRANULOCYTES # BLD AUTO: 0.12 K/UL (ref 0–0.04)
IMM GRANULOCYTES # BLD AUTO: 0.12 K/UL (ref 0–0.04)
IMM GRANULOCYTES # BLD AUTO: 0.13 K/UL (ref 0–0.04)
IMM GRANULOCYTES # BLD AUTO: 0.13 K/UL (ref 0–0.04)
IMM GRANULOCYTES # BLD AUTO: 0.15 K/UL (ref 0–0.04)
IMM GRANULOCYTES # BLD AUTO: 0.16 K/UL (ref 0–0.04)
IMM GRANULOCYTES # BLD AUTO: 0.17 K/UL (ref 0–0.04)
IMM GRANULOCYTES # BLD AUTO: 0.18 K/UL (ref 0–0.04)
IMM GRANULOCYTES NFR BLD AUTO: 0.8 % (ref 0–0.5)
IMM GRANULOCYTES NFR BLD AUTO: 0.9 % (ref 0–0.5)
IMM GRANULOCYTES NFR BLD AUTO: 0.9 % (ref 0–0.5)
IMM GRANULOCYTES NFR BLD AUTO: 1 % (ref 0–0.5)
IMM GRANULOCYTES NFR BLD AUTO: 1 % (ref 0–0.5)
IMM GRANULOCYTES NFR BLD AUTO: 1.1 % (ref 0–0.5)
IMM GRANULOCYTES NFR BLD AUTO: 1.1 % (ref 0–0.5)
IMM GRANULOCYTES NFR BLD AUTO: 1.2 % (ref 0–0.5)
IMM GRANULOCYTES NFR BLD AUTO: 1.4 % (ref 0–0.5)
IMM GRANULOCYTES NFR BLD AUTO: 1.4 % (ref 0–0.5)
INFLUENZA A, MOLECULAR: NEGATIVE
INFLUENZA A, MOLECULAR: NOT DETECTED
INFLUENZA B, MOLECULAR: NEGATIVE
INFLUENZA B, MOLECULAR: NOT DETECTED
INR PPP: 1.4 (ref 0.8–1.2)
KETONES UR QL STRIP: NEGATIVE
LACTATE SERPL-SCNC: 1.2 MMOL/L (ref 0.5–2.2)
LDH SERPL L TO P-CCNC: 1.54 MMOL/L (ref 0.5–2.2)
LDH SERPL L TO P-CCNC: 290 U/L (ref 110–260)
LDH SERPL L TO P-CCNC: 4.59 MMOL/L (ref 0.5–2.2)
LEUKOCYTE ESTERASE UR QL STRIP: ABNORMAL
LYMPHOCYTES # BLD AUTO: 0.6 K/UL (ref 1–4.8)
LYMPHOCYTES # BLD AUTO: 0.6 K/UL (ref 1–4.8)
LYMPHOCYTES # BLD AUTO: 1.1 K/UL (ref 1–4.8)
LYMPHOCYTES # BLD AUTO: 1.4 K/UL (ref 1–4.8)
LYMPHOCYTES # BLD AUTO: 1.4 K/UL (ref 1–4.8)
LYMPHOCYTES # BLD AUTO: 1.5 K/UL (ref 1–4.8)
LYMPHOCYTES # BLD AUTO: 1.8 K/UL (ref 1–4.8)
LYMPHOCYTES # BLD AUTO: 1.9 K/UL (ref 1–4.8)
LYMPHOCYTES # BLD AUTO: 2.2 K/UL (ref 1–4.8)
LYMPHOCYTES # BLD AUTO: 2.3 K/UL (ref 1–4.8)
LYMPHOCYTES # BLD AUTO: 2.9 K/UL (ref 1–4.8)
LYMPHOCYTES NFR BLD: 10 % (ref 18–48)
LYMPHOCYTES NFR BLD: 10.3 % (ref 18–48)
LYMPHOCYTES NFR BLD: 10.3 % (ref 18–48)
LYMPHOCYTES NFR BLD: 12.8 % (ref 18–48)
LYMPHOCYTES NFR BLD: 13.2 % (ref 18–48)
LYMPHOCYTES NFR BLD: 14.5 % (ref 18–48)
LYMPHOCYTES NFR BLD: 14.5 % (ref 18–48)
LYMPHOCYTES NFR BLD: 15.9 % (ref 18–48)
LYMPHOCYTES NFR BLD: 17.7 % (ref 18–48)
LYMPHOCYTES NFR BLD: 19.5 % (ref 18–48)
LYMPHOCYTES NFR BLD: 19.9 % (ref 18–48)
LYMPHOCYTES NFR BLD: 5.3 % (ref 18–48)
LYMPHOCYTES NFR BLD: 6.7 % (ref 18–48)
MAGNESIUM SERPL-MCNC: 1.5 MG/DL (ref 1.6–2.6)
MAGNESIUM SERPL-MCNC: 1.6 MG/DL (ref 1.6–2.6)
MAGNESIUM SERPL-MCNC: 1.6 MG/DL (ref 1.6–2.6)
MCH RBC QN AUTO: 24 PG (ref 27–31)
MCH RBC QN AUTO: 24.1 PG (ref 27–31)
MCH RBC QN AUTO: 24.1 PG (ref 27–31)
MCH RBC QN AUTO: 24.4 PG (ref 27–31)
MCH RBC QN AUTO: 24.5 PG (ref 27–31)
MCH RBC QN AUTO: 24.5 PG (ref 27–31)
MCH RBC QN AUTO: 24.7 PG (ref 27–31)
MCH RBC QN AUTO: 24.7 PG (ref 27–31)
MCH RBC QN AUTO: 24.9 PG (ref 27–31)
MCH RBC QN AUTO: 25 PG (ref 27–31)
MCHC RBC AUTO-ENTMCNC: 30.2 G/DL (ref 32–36)
MCHC RBC AUTO-ENTMCNC: 30.4 G/DL (ref 32–36)
MCHC RBC AUTO-ENTMCNC: 30.7 G/DL (ref 32–36)
MCHC RBC AUTO-ENTMCNC: 30.7 G/DL (ref 32–36)
MCHC RBC AUTO-ENTMCNC: 30.8 G/DL (ref 32–36)
MCHC RBC AUTO-ENTMCNC: 30.9 G/DL (ref 32–36)
MCHC RBC AUTO-ENTMCNC: 31.1 G/DL (ref 32–36)
MCHC RBC AUTO-ENTMCNC: 31.2 G/DL (ref 32–36)
MCHC RBC AUTO-ENTMCNC: 31.2 G/DL (ref 32–36)
MCV RBC AUTO: 78 FL (ref 82–98)
MCV RBC AUTO: 79 FL (ref 82–98)
MCV RBC AUTO: 80 FL (ref 82–98)
MCV RBC AUTO: 81 FL (ref 82–98)
MICROSCOPIC COMMENT: ABNORMAL
MONOCYTES # BLD AUTO: 1.4 K/UL (ref 0.3–1)
MONOCYTES # BLD AUTO: 2.6 K/UL (ref 0.3–1)
MONOCYTES # BLD AUTO: 2.7 K/UL (ref 0.3–1)
MONOCYTES # BLD AUTO: 2.7 K/UL (ref 0.3–1)
MONOCYTES # BLD AUTO: 2.9 K/UL (ref 0.3–1)
MONOCYTES # BLD AUTO: 2.9 K/UL (ref 0.3–1)
MONOCYTES # BLD AUTO: 3.4 K/UL (ref 0.3–1)
MONOCYTES # BLD AUTO: 3.7 K/UL (ref 0.3–1)
MONOCYTES # BLD AUTO: 3.7 K/UL (ref 0.3–1)
MONOCYTES # BLD AUTO: 3.8 K/UL (ref 0.3–1)
MONOCYTES # BLD AUTO: 4.3 K/UL (ref 0.3–1)
MONOCYTES # BLD AUTO: 4.8 K/UL (ref 0.3–1)
MONOCYTES # BLD AUTO: 5.1 K/UL (ref 0.3–1)
MONOCYTES NFR BLD: 15.9 % (ref 4–15)
MONOCYTES NFR BLD: 23.3 % (ref 4–15)
MONOCYTES NFR BLD: 23.7 % (ref 4–15)
MONOCYTES NFR BLD: 25 % (ref 4–15)
MONOCYTES NFR BLD: 25.5 % (ref 4–15)
MONOCYTES NFR BLD: 25.9 % (ref 4–15)
MONOCYTES NFR BLD: 26.4 % (ref 4–15)
MONOCYTES NFR BLD: 26.9 % (ref 4–15)
MONOCYTES NFR BLD: 29.5 % (ref 4–15)
MONOCYTES NFR BLD: 29.5 % (ref 4–15)
MONOCYTES NFR BLD: 31 % (ref 4–15)
MONOCYTES NFR BLD: 32.5 % (ref 4–15)
MONOCYTES NFR BLD: 34.9 % (ref 4–15)
NEUTROPHILS # BLD AUTO: 5.9 K/UL (ref 1.8–7.7)
NEUTROPHILS # BLD AUTO: 6.1 K/UL (ref 1.8–7.7)
NEUTROPHILS # BLD AUTO: 6.2 K/UL (ref 1.8–7.7)
NEUTROPHILS # BLD AUTO: 6.5 K/UL (ref 1.8–7.7)
NEUTROPHILS # BLD AUTO: 6.7 K/UL (ref 1.8–7.7)
NEUTROPHILS # BLD AUTO: 7 K/UL (ref 1.8–7.7)
NEUTROPHILS # BLD AUTO: 7.5 K/UL (ref 1.8–7.7)
NEUTROPHILS # BLD AUTO: 7.7 K/UL (ref 1.8–7.7)
NEUTROPHILS # BLD AUTO: 7.7 K/UL (ref 1.8–7.7)
NEUTROPHILS # BLD AUTO: 7.8 K/UL (ref 1.8–7.7)
NEUTROPHILS # BLD AUTO: 8 K/UL (ref 1.8–7.7)
NEUTROPHILS NFR BLD: 51.3 % (ref 38–73)
NEUTROPHILS NFR BLD: 51.5 % (ref 38–73)
NEUTROPHILS NFR BLD: 52 % (ref 38–73)
NEUTROPHILS NFR BLD: 53.1 % (ref 38–73)
NEUTROPHILS NFR BLD: 53.3 % (ref 38–73)
NEUTROPHILS NFR BLD: 53.3 % (ref 38–73)
NEUTROPHILS NFR BLD: 53.4 % (ref 38–73)
NEUTROPHILS NFR BLD: 55.6 % (ref 38–73)
NEUTROPHILS NFR BLD: 56.2 % (ref 38–73)
NEUTROPHILS NFR BLD: 58 % (ref 38–73)
NEUTROPHILS NFR BLD: 62.4 % (ref 38–73)
NEUTROPHILS NFR BLD: 69.2 % (ref 38–73)
NEUTROPHILS NFR BLD: 75.2 % (ref 38–73)
NITRITE UR QL STRIP: NEGATIVE
NON-SQ EPI CELLS #/AREA URNS AUTO: 3 /HPF
NRBC BLD-RTO: 0 /100 WBC
OVALOCYTES BLD QL SMEAR: ABNORMAL
PCO2 BLDA: 25.9 MMHG (ref 35–45)
PH SMN: 7.42 [PH] (ref 7.35–7.45)
PH UR STRIP: 6 [PH] (ref 5–8)
PLATELET # BLD AUTO: 104 K/UL (ref 150–450)
PLATELET # BLD AUTO: 108 K/UL (ref 150–450)
PLATELET # BLD AUTO: 109 K/UL (ref 150–450)
PLATELET # BLD AUTO: 109 K/UL (ref 150–450)
PLATELET # BLD AUTO: 112 K/UL (ref 150–450)
PLATELET # BLD AUTO: 117 K/UL (ref 150–450)
PLATELET # BLD AUTO: 118 K/UL (ref 150–450)
PLATELET # BLD AUTO: 119 K/UL (ref 150–450)
PLATELET # BLD AUTO: 119 K/UL (ref 150–450)
PLATELET # BLD AUTO: 120 K/UL (ref 150–450)
PLATELET # BLD AUTO: 124 K/UL (ref 150–450)
PLATELET # BLD AUTO: 136 K/UL (ref 150–450)
PLATELET # BLD AUTO: 137 K/UL (ref 150–450)
PLATELET BLD QL SMEAR: ABNORMAL
PMV BLD AUTO: ABNORMAL FL (ref 9.2–12.9)
PO2 BLDA: 51 MMHG (ref 80–100)
POC BE: -7 MMOL/L
POC SATURATED O2: 88 % (ref 95–100)
POC TCO2: 18 MMOL/L (ref 23–27)
POIKILOCYTOSIS BLD QL SMEAR: ABNORMAL
POIKILOCYTOSIS BLD QL SMEAR: SLIGHT
POLYCHROMASIA BLD QL SMEAR: ABNORMAL
POTASSIUM SERPL-SCNC: 3.5 MMOL/L (ref 3.5–5.1)
POTASSIUM SERPL-SCNC: 3.8 MMOL/L (ref 3.5–5.1)
POTASSIUM SERPL-SCNC: 3.9 MMOL/L (ref 3.5–5.1)
POTASSIUM SERPL-SCNC: 4 MMOL/L (ref 3.5–5.1)
POTASSIUM SERPL-SCNC: 4.2 MMOL/L (ref 3.5–5.1)
POTASSIUM SERPL-SCNC: 4.9 MMOL/L (ref 3.5–5.1)
PROCALCITONIN SERPL IA-MCNC: 0.44 NG/ML
PROT SERPL-MCNC: 5 G/DL (ref 6–8.4)
PROT SERPL-MCNC: 5.1 G/DL (ref 6–8.4)
PROT SERPL-MCNC: 5.2 G/DL (ref 6–8.4)
PROT SERPL-MCNC: 5.3 G/DL (ref 6–8.4)
PROT SERPL-MCNC: 5.4 G/DL (ref 6–8.4)
PROT SERPL-MCNC: 6.1 G/DL (ref 6–8.4)
PROT UR QL STRIP: ABNORMAL
PROTHROMBIN TIME: 15 SEC (ref 9–12.5)
RBC # BLD AUTO: 3.04 M/UL (ref 4–5.4)
RBC # BLD AUTO: 3.2 M/UL (ref 4–5.4)
RBC # BLD AUTO: 3.21 M/UL (ref 4–5.4)
RBC # BLD AUTO: 3.28 M/UL (ref 4–5.4)
RBC # BLD AUTO: 3.31 M/UL (ref 4–5.4)
RBC # BLD AUTO: 3.34 M/UL (ref 4–5.4)
RBC # BLD AUTO: 3.36 M/UL (ref 4–5.4)
RBC # BLD AUTO: 3.4 M/UL (ref 4–5.4)
RBC # BLD AUTO: 3.43 M/UL (ref 4–5.4)
RBC # BLD AUTO: 3.57 M/UL (ref 4–5.4)
RBC # BLD AUTO: 3.86 M/UL (ref 4–5.4)
RBC #/AREA URNS AUTO: 35 /HPF (ref 0–4)
RSV AG BY MOLECULAR METHOD: NOT DETECTED
SAMPLE: ABNORMAL
SAMPLE: ABNORMAL
SAMPLE: NORMAL
SARS-COV-2 RNA RESP QL NAA+PROBE: NOT DETECTED
SCHISTOCYTES BLD QL SMEAR: ABNORMAL
SCHISTOCYTES BLD QL SMEAR: PRESENT
SCHISTOCYTES BLD QL SMEAR: PRESENT
SITE: ABNORMAL
SITE: ABNORMAL
SITE: NORMAL
SMUDGE CELLS BLD QL SMEAR: PRESENT
SODIUM SERPL-SCNC: 134 MMOL/L (ref 136–145)
SODIUM SERPL-SCNC: 135 MMOL/L (ref 136–145)
SODIUM SERPL-SCNC: 137 MMOL/L (ref 136–145)
SODIUM SERPL-SCNC: 139 MMOL/L (ref 136–145)
SODIUM SERPL-SCNC: 139 MMOL/L (ref 136–145)
SODIUM SERPL-SCNC: 140 MMOL/L (ref 136–145)
SP GR UR STRIP: 1.02 (ref 1–1.03)
SPECIMEN SOURCE: NORMAL
SPHEROCYTES BLD QL SMEAR: ABNORMAL
SQUAMOUS #/AREA URNS AUTO: 35 /HPF
TARGETS BLD QL SMEAR: ABNORMAL
TROPONIN I SERPL DL<=0.01 NG/ML-MCNC: 0.02 NG/ML (ref 0–0.03)
TSH SERPL DL<=0.005 MIU/L-ACNC: 1.88 UIU/ML (ref 0.4–4)
URN SPEC COLLECT METH UR: ABNORMAL
VANCOMYCIN TROUGH SERPL-MCNC: 19.7 UG/ML (ref 10–22)
VANCOMYCIN TROUGH SERPL-MCNC: 19.8 UG/ML (ref 10–22)
VANCOMYCIN TROUGH SERPL-MCNC: 22.5 UG/ML (ref 10–22)
WBC # BLD AUTO: 10.77 K/UL (ref 3.9–12.7)
WBC # BLD AUTO: 10.78 K/UL (ref 3.9–12.7)
WBC # BLD AUTO: 10.99 K/UL (ref 3.9–12.7)
WBC # BLD AUTO: 11.13 K/UL (ref 3.9–12.7)
WBC # BLD AUTO: 11.58 K/UL (ref 3.9–12.7)
WBC # BLD AUTO: 12.65 K/UL (ref 3.9–12.7)
WBC # BLD AUTO: 12.65 K/UL (ref 3.9–12.7)
WBC # BLD AUTO: 13.15 K/UL (ref 3.9–12.7)
WBC # BLD AUTO: 13.71 K/UL (ref 3.9–12.7)
WBC # BLD AUTO: 14.54 K/UL (ref 3.9–12.7)
WBC # BLD AUTO: 14.88 K/UL (ref 3.9–12.7)
WBC # BLD AUTO: 15.07 K/UL (ref 3.9–12.7)
WBC # BLD AUTO: 8.57 K/UL (ref 3.9–12.7)
WBC #/AREA URNS AUTO: 51 /HPF (ref 0–5)

## 2024-01-01 PROCEDURE — 85730 THROMBOPLASTIN TIME PARTIAL: CPT | Performed by: HOSPITALIST

## 2024-01-01 PROCEDURE — 36415 COLL VENOUS BLD VENIPUNCTURE: CPT | Performed by: STUDENT IN AN ORGANIZED HEALTH CARE EDUCATION/TRAINING PROGRAM

## 2024-01-01 PROCEDURE — 27000221 HC OXYGEN, UP TO 24 HOURS

## 2024-01-01 PROCEDURE — 25000003 PHARM REV CODE 250: Performed by: STUDENT IN AN ORGANIZED HEALTH CARE EDUCATION/TRAINING PROGRAM

## 2024-01-01 PROCEDURE — 85025 COMPLETE CBC W/AUTO DIFF WBC: CPT | Performed by: HOSPITALIST

## 2024-01-01 PROCEDURE — 85730 THROMBOPLASTIN TIME PARTIAL: CPT | Performed by: STUDENT IN AN ORGANIZED HEALTH CARE EDUCATION/TRAINING PROGRAM

## 2024-01-01 PROCEDURE — 87040 BLOOD CULTURE FOR BACTERIA: CPT | Performed by: EMERGENCY MEDICINE

## 2024-01-01 PROCEDURE — 25000003 PHARM REV CODE 250: Performed by: HOSPITALIST

## 2024-01-01 PROCEDURE — 85025 COMPLETE CBC W/AUTO DIFF WBC: CPT | Performed by: EMERGENCY MEDICINE

## 2024-01-01 PROCEDURE — 94640 AIRWAY INHALATION TREATMENT: CPT

## 2024-01-01 PROCEDURE — 63600175 PHARM REV CODE 636 W HCPCS: Performed by: EMERGENCY MEDICINE

## 2024-01-01 PROCEDURE — 99900035 HC TECH TIME PER 15 MIN (STAT)

## 2024-01-01 PROCEDURE — 94761 N-INVAS EAR/PLS OXIMETRY MLT: CPT

## 2024-01-01 PROCEDURE — 85730 THROMBOPLASTIN TIME PARTIAL: CPT | Mod: 91 | Performed by: STUDENT IN AN ORGANIZED HEALTH CARE EDUCATION/TRAINING PROGRAM

## 2024-01-01 PROCEDURE — 96367 TX/PROPH/DG ADDL SEQ IV INF: CPT

## 2024-01-01 PROCEDURE — 83605 ASSAY OF LACTIC ACID: CPT

## 2024-01-01 PROCEDURE — 63600175 PHARM REV CODE 636 W HCPCS: Performed by: STUDENT IN AN ORGANIZED HEALTH CARE EDUCATION/TRAINING PROGRAM

## 2024-01-01 PROCEDURE — 87086 URINE CULTURE/COLONY COUNT: CPT | Performed by: EMERGENCY MEDICINE

## 2024-01-01 PROCEDURE — 86803 HEPATITIS C AB TEST: CPT | Performed by: PHYSICIAN ASSISTANT

## 2024-01-01 PROCEDURE — 87186 SC STD MICRODIL/AGAR DIL: CPT | Mod: 59 | Performed by: EMERGENCY MEDICINE

## 2024-01-01 PROCEDURE — 99999 PR PBB SHADOW E&M-EST. PATIENT-LVL IV: CPT | Mod: PBBFAC,,, | Performed by: INTERNAL MEDICINE

## 2024-01-01 PROCEDURE — 36415 COLL VENOUS BLD VENIPUNCTURE: CPT | Mod: XB | Performed by: STUDENT IN AN ORGANIZED HEALTH CARE EDUCATION/TRAINING PROGRAM

## 2024-01-01 PROCEDURE — 96375 TX/PRO/DX INJ NEW DRUG ADDON: CPT

## 2024-01-01 PROCEDURE — 63600175 PHARM REV CODE 636 W HCPCS: Performed by: HOSPITALIST

## 2024-01-01 PROCEDURE — 92610 EVALUATE SWALLOWING FUNCTION: CPT

## 2024-01-01 PROCEDURE — 36415 COLL VENOUS BLD VENIPUNCTURE: CPT | Performed by: HOSPITALIST

## 2024-01-01 PROCEDURE — 96365 THER/PROPH/DIAG IV INF INIT: CPT

## 2024-01-01 PROCEDURE — 85610 PROTHROMBIN TIME: CPT | Performed by: HOSPITALIST

## 2024-01-01 PROCEDURE — 83605 ASSAY OF LACTIC ACID: CPT | Performed by: HOSPITALIST

## 2024-01-01 PROCEDURE — 80053 COMPREHEN METABOLIC PANEL: CPT | Performed by: HOSPITALIST

## 2024-01-01 PROCEDURE — 83735 ASSAY OF MAGNESIUM: CPT | Performed by: HOSPITALIST

## 2024-01-01 PROCEDURE — 80202 ASSAY OF VANCOMYCIN: CPT | Performed by: HOSPITALIST

## 2024-01-01 PROCEDURE — 97535 SELF CARE MNGMENT TRAINING: CPT

## 2024-01-01 PROCEDURE — 80202 ASSAY OF VANCOMYCIN: CPT | Performed by: STUDENT IN AN ORGANIZED HEALTH CARE EDUCATION/TRAINING PROGRAM

## 2024-01-01 PROCEDURE — 99223 1ST HOSP IP/OBS HIGH 75: CPT | Mod: ,,,

## 2024-01-01 PROCEDURE — 87088 URINE BACTERIA CULTURE: CPT | Performed by: EMERGENCY MEDICINE

## 2024-01-01 PROCEDURE — 11000001 HC ACUTE MED/SURG PRIVATE ROOM

## 2024-01-01 PROCEDURE — 84484 ASSAY OF TROPONIN QUANT: CPT | Performed by: EMERGENCY MEDICINE

## 2024-01-01 PROCEDURE — 25000003 PHARM REV CODE 250

## 2024-01-01 PROCEDURE — 99499 UNLISTED E&M SERVICE: CPT | Mod: ,,, | Performed by: INTERNAL MEDICINE

## 2024-01-01 PROCEDURE — 25000003 PHARM REV CODE 250: Performed by: FAMILY MEDICINE

## 2024-01-01 PROCEDURE — 85730 THROMBOPLASTIN TIME PARTIAL: CPT | Mod: 91 | Performed by: HOSPITALIST

## 2024-01-01 PROCEDURE — 92526 ORAL FUNCTION THERAPY: CPT

## 2024-01-01 PROCEDURE — 63600175 PHARM REV CODE 636 W HCPCS

## 2024-01-01 PROCEDURE — 63600175 PHARM REV CODE 636 W HCPCS: Performed by: FAMILY MEDICINE

## 2024-01-01 PROCEDURE — 80053 COMPREHEN METABOLIC PANEL: CPT | Performed by: EMERGENCY MEDICINE

## 2024-01-01 PROCEDURE — 36415 COLL VENOUS BLD VENIPUNCTURE: CPT | Mod: XB | Performed by: FAMILY MEDICINE

## 2024-01-01 PROCEDURE — 82803 BLOOD GASES ANY COMBINATION: CPT

## 2024-01-01 PROCEDURE — 99291 CRITICAL CARE FIRST HOUR: CPT

## 2024-01-01 PROCEDURE — 36415 COLL VENOUS BLD VENIPUNCTURE: CPT | Mod: XB | Performed by: HOSPITALIST

## 2024-01-01 PROCEDURE — 83880 ASSAY OF NATRIURETIC PEPTIDE: CPT | Performed by: EMERGENCY MEDICINE

## 2024-01-01 PROCEDURE — 99233 SBSQ HOSP IP/OBS HIGH 50: CPT | Mod: ,,,

## 2024-01-01 PROCEDURE — 21400001 HC TELEMETRY ROOM

## 2024-01-01 PROCEDURE — 87077 CULTURE AEROBIC IDENTIFY: CPT | Mod: 59 | Performed by: EMERGENCY MEDICINE

## 2024-01-01 PROCEDURE — 0241U SARS-COV2 (COVID) WITH FLU/RSV BY PCR: CPT

## 2024-01-01 PROCEDURE — 87389 HIV-1 AG W/HIV-1&-2 AB AG IA: CPT | Performed by: PHYSICIAN ASSISTANT

## 2024-01-01 PROCEDURE — 82565 ASSAY OF CREATININE: CPT | Performed by: STUDENT IN AN ORGANIZED HEALTH CARE EDUCATION/TRAINING PROGRAM

## 2024-01-01 PROCEDURE — 36600 WITHDRAWAL OF ARTERIAL BLOOD: CPT

## 2024-01-01 PROCEDURE — 87502 INFLUENZA DNA AMP PROBE: CPT | Performed by: EMERGENCY MEDICINE

## 2024-01-01 PROCEDURE — 99233 SBSQ HOSP IP/OBS HIGH 50: CPT | Mod: ,,, | Performed by: INTERNAL MEDICINE

## 2024-01-01 PROCEDURE — 83615 LACTATE (LD) (LDH) ENZYME: CPT

## 2024-01-01 PROCEDURE — 25500020 PHARM REV CODE 255: Performed by: FAMILY MEDICINE

## 2024-01-01 PROCEDURE — 99214 OFFICE O/P EST MOD 30 MIN: CPT | Mod: S$PBB,,, | Performed by: INTERNAL MEDICINE

## 2024-01-01 PROCEDURE — 93005 ELECTROCARDIOGRAM TRACING: CPT

## 2024-01-01 PROCEDURE — 93010 ELECTROCARDIOGRAM REPORT: CPT | Mod: ,,, | Performed by: INTERNAL MEDICINE

## 2024-01-01 PROCEDURE — 80202 ASSAY OF VANCOMYCIN: CPT | Performed by: FAMILY MEDICINE

## 2024-01-01 PROCEDURE — 96361 HYDRATE IV INFUSION ADD-ON: CPT

## 2024-01-01 PROCEDURE — 25000242 PHARM REV CODE 250 ALT 637 W/ HCPCS: Performed by: STUDENT IN AN ORGANIZED HEALTH CARE EDUCATION/TRAINING PROGRAM

## 2024-01-01 PROCEDURE — 63600175 PHARM REV CODE 636 W HCPCS: Performed by: INTERNAL MEDICINE

## 2024-01-01 PROCEDURE — 84145 PROCALCITONIN (PCT): CPT | Performed by: PHYSICIAN ASSISTANT

## 2024-01-01 PROCEDURE — 99214 OFFICE O/P EST MOD 30 MIN: CPT | Mod: PBBFAC | Performed by: INTERNAL MEDICINE

## 2024-01-01 PROCEDURE — 81001 URINALYSIS AUTO W/SCOPE: CPT | Performed by: EMERGENCY MEDICINE

## 2024-01-01 PROCEDURE — 85025 COMPLETE CBC W/AUTO DIFF WBC: CPT | Mod: 91 | Performed by: HOSPITALIST

## 2024-01-01 PROCEDURE — 84443 ASSAY THYROID STIM HORMONE: CPT | Performed by: EMERGENCY MEDICINE

## 2024-01-01 PROCEDURE — 12000002 HC ACUTE/MED SURGE SEMI-PRIVATE ROOM

## 2024-01-01 RX ORDER — ACETAMINOPHEN 325 MG/1
650 TABLET ORAL EVERY 4 HOURS PRN
Status: DISCONTINUED | OUTPATIENT
Start: 2024-01-01 | End: 2024-01-01 | Stop reason: HOSPADM

## 2024-01-01 RX ORDER — ALUMINUM HYDROXIDE, MAGNESIUM HYDROXIDE, AND SIMETHICONE 1200; 120; 1200 MG/30ML; MG/30ML; MG/30ML
30 SUSPENSION ORAL 4 TIMES DAILY PRN
Status: DISCONTINUED | OUTPATIENT
Start: 2024-01-01 | End: 2024-01-01 | Stop reason: HOSPADM

## 2024-01-01 RX ORDER — AMOXICILLIN AND CLAVULANATE POTASSIUM 875; 125 MG/1; MG/1
1 TABLET, FILM COATED ORAL 2 TIMES DAILY
Status: ON HOLD | COMMUNITY
Start: 2024-01-01 | End: 2024-01-01

## 2024-01-01 RX ORDER — POLYETHYLENE GLYCOL 3350 17 G/17G
17 POWDER, FOR SOLUTION ORAL DAILY PRN
Status: DISCONTINUED | OUTPATIENT
Start: 2024-01-01 | End: 2024-01-01 | Stop reason: HOSPADM

## 2024-01-01 RX ORDER — SODIUM CHLORIDE 0.9 % (FLUSH) 0.9 %
1-10 SYRINGE (ML) INJECTION EVERY 12 HOURS PRN
Status: DISCONTINUED | OUTPATIENT
Start: 2024-01-01 | End: 2024-01-01 | Stop reason: HOSPADM

## 2024-01-01 RX ORDER — THIAMINE HCL 100 MG
100 TABLET ORAL DAILY
Status: DISCONTINUED | OUTPATIENT
Start: 2024-01-01 | End: 2024-01-01 | Stop reason: HOSPADM

## 2024-01-01 RX ORDER — HEPARIN SODIUM,PORCINE/D5W 25000/250
0-40 INTRAVENOUS SOLUTION INTRAVENOUS CONTINUOUS
Status: DISCONTINUED | OUTPATIENT
Start: 2024-01-01 | End: 2024-01-01

## 2024-01-01 RX ORDER — NALOXONE HCL 0.4 MG/ML
0.02 VIAL (ML) INJECTION
Status: DISCONTINUED | OUTPATIENT
Start: 2024-01-01 | End: 2024-01-01 | Stop reason: HOSPADM

## 2024-01-01 RX ORDER — ACETAMINOPHEN 325 MG/1
650 TABLET ORAL EVERY 8 HOURS PRN
Status: DISCONTINUED | OUTPATIENT
Start: 2024-01-01 | End: 2024-01-01 | Stop reason: HOSPADM

## 2024-01-01 RX ORDER — QUETIAPINE FUMARATE 25 MG/1
25 TABLET, FILM COATED ORAL NIGHTLY PRN
Status: DISCONTINUED | OUTPATIENT
Start: 2024-01-01 | End: 2024-01-01 | Stop reason: HOSPADM

## 2024-01-01 RX ORDER — GABAPENTIN 100 MG/1
100 CAPSULE ORAL 3 TIMES DAILY
Status: DISCONTINUED | OUTPATIENT
Start: 2024-01-01 | End: 2024-01-01 | Stop reason: HOSPADM

## 2024-01-01 RX ORDER — FAMOTIDINE 20 MG/1
20 TABLET, FILM COATED ORAL
Status: ON HOLD | COMMUNITY
Start: 2023-01-01 | End: 2024-01-01 | Stop reason: HOSPADM

## 2024-01-01 RX ORDER — ONDANSETRON 8 MG/1
8 TABLET, ORALLY DISINTEGRATING ORAL EVERY 8 HOURS PRN
Status: DISCONTINUED | OUTPATIENT
Start: 2024-01-01 | End: 2024-01-01 | Stop reason: HOSPADM

## 2024-01-01 RX ORDER — TALC
6 POWDER (GRAM) TOPICAL NIGHTLY PRN
Status: DISCONTINUED | OUTPATIENT
Start: 2024-01-01 | End: 2024-01-01 | Stop reason: HOSPADM

## 2024-01-01 RX ORDER — ATORVASTATIN CALCIUM 40 MG/1
40 TABLET, FILM COATED ORAL NIGHTLY
Status: DISCONTINUED | OUTPATIENT
Start: 2024-01-01 | End: 2024-01-01 | Stop reason: HOSPADM

## 2024-01-01 RX ORDER — GUAIFENESIN 100 MG/5ML
200 SOLUTION ORAL
Status: DISCONTINUED | OUTPATIENT
Start: 2024-01-01 | End: 2024-01-01 | Stop reason: HOSPADM

## 2024-01-01 RX ORDER — HALOPERIDOL 5 MG/ML
2 INJECTION INTRAMUSCULAR EVERY 6 HOURS PRN
Status: DISCONTINUED | OUTPATIENT
Start: 2024-01-01 | End: 2024-01-01 | Stop reason: HOSPADM

## 2024-01-01 RX ORDER — SIMETHICONE 80 MG
1 TABLET,CHEWABLE ORAL 4 TIMES DAILY PRN
Status: DISCONTINUED | OUTPATIENT
Start: 2024-01-01 | End: 2024-01-01 | Stop reason: HOSPADM

## 2024-01-01 RX ORDER — LIDOCAINE HYDROCHLORIDE 10 MG/ML
10 INJECTION, SOLUTION EPIDURAL; INFILTRATION; INTRACAUDAL; PERINEURAL
Status: ACTIVE | OUTPATIENT
Start: 2024-01-01 | End: 2024-01-01

## 2024-01-01 RX ORDER — IPRATROPIUM BROMIDE AND ALBUTEROL SULFATE 2.5; .5 MG/3ML; MG/3ML
3 SOLUTION RESPIRATORY (INHALATION) 2 TIMES DAILY PRN
Status: DISCONTINUED | OUTPATIENT
Start: 2024-01-01 | End: 2024-01-01 | Stop reason: HOSPADM

## 2024-01-01 RX ORDER — HYDROXYCHLOROQUINE SULFATE 200 MG/1
200 TABLET, FILM COATED ORAL DAILY
Qty: 90 TABLET | Refills: 3
Start: 2024-01-01 | End: 2025-01-23

## 2024-01-01 RX ORDER — AMOXICILLIN AND CLAVULANATE POTASSIUM 875; 125 MG/1; MG/1
1 TABLET, FILM COATED ORAL 2 TIMES DAILY
Qty: 10 TABLET | Refills: 0 | Status: SHIPPED | OUTPATIENT
Start: 2024-01-01 | End: 2024-02-10

## 2024-01-01 RX ORDER — FLUTICASONE FUROATE AND VILANTEROL 100; 25 UG/1; UG/1
1 POWDER RESPIRATORY (INHALATION) DAILY
Status: DISCONTINUED | OUTPATIENT
Start: 2024-01-01 | End: 2024-01-01 | Stop reason: HOSPADM

## 2024-01-01 RX ORDER — PREDNISONE 5 MG/1
10 TABLET ORAL DAILY
Status: DISCONTINUED | OUTPATIENT
Start: 2024-01-01 | End: 2024-01-01 | Stop reason: HOSPADM

## 2024-01-01 RX ORDER — HYDROXYCHLOROQUINE SULFATE 200 MG/1
200 TABLET, FILM COATED ORAL DAILY
Status: DISCONTINUED | OUTPATIENT
Start: 2024-01-01 | End: 2024-01-01 | Stop reason: HOSPADM

## 2024-01-01 RX ORDER — PANTOPRAZOLE SODIUM 40 MG/1
40 TABLET, DELAYED RELEASE ORAL DAILY
Status: DISCONTINUED | OUTPATIENT
Start: 2024-01-01 | End: 2024-01-01 | Stop reason: HOSPADM

## 2024-01-01 RX ADMIN — HYDROXYCHLOROQUINE SULFATE 200 MG: 200 TABLET, FILM COATED ORAL at 10:01

## 2024-01-01 RX ADMIN — FLUTICASONE FUROATE AND VILANTEROL TRIFENATATE 1 PUFF: 100; 25 POWDER RESPIRATORY (INHALATION) at 12:01

## 2024-01-01 RX ADMIN — SODIUM CHLORIDE, POTASSIUM CHLORIDE, SODIUM LACTATE AND CALCIUM CHLORIDE 1000 ML: 600; 310; 30; 20 INJECTION, SOLUTION INTRAVENOUS at 06:01

## 2024-01-01 RX ADMIN — CEFEPIME 2 G: 2 INJECTION, POWDER, FOR SOLUTION INTRAVENOUS at 06:02

## 2024-01-01 RX ADMIN — CEFEPIME 2 G: 2 INJECTION, POWDER, FOR SOLUTION INTRAVENOUS at 05:02

## 2024-01-01 RX ADMIN — GUAIFENESIN 200 MG: 200 SOLUTION ORAL at 04:01

## 2024-01-01 RX ADMIN — GUAIFENESIN 200 MG: 200 SOLUTION ORAL at 06:02

## 2024-01-01 RX ADMIN — GABAPENTIN 100 MG: 100 CAPSULE ORAL at 09:02

## 2024-01-01 RX ADMIN — GABAPENTIN 100 MG: 100 CAPSULE ORAL at 08:02

## 2024-01-01 RX ADMIN — PREDNISONE 10 MG: 10 TABLET ORAL at 02:02

## 2024-01-01 RX ADMIN — PANTOPRAZOLE SODIUM 40 MG: 40 TABLET, DELAYED RELEASE ORAL at 09:01

## 2024-01-01 RX ADMIN — GABAPENTIN 100 MG: 100 CAPSULE ORAL at 02:02

## 2024-01-01 RX ADMIN — Medication 100 MG: at 08:02

## 2024-01-01 RX ADMIN — FLUTICASONE FUROATE AND VILANTEROL TRIFENATATE 1 PUFF: 100; 25 POWDER RESPIRATORY (INHALATION) at 09:02

## 2024-01-01 RX ADMIN — HYDROXYCHLOROQUINE SULFATE 200 MG: 200 TABLET, FILM COATED ORAL at 09:02

## 2024-01-01 RX ADMIN — APIXABAN 5 MG: 5 TABLET, FILM COATED ORAL at 08:02

## 2024-01-01 RX ADMIN — QUETIAPINE FUMARATE 25 MG: 25 TABLET ORAL at 09:02

## 2024-01-01 RX ADMIN — CEFEPIME 2 G: 2 INJECTION, POWDER, FOR SOLUTION INTRAVENOUS at 05:01

## 2024-01-01 RX ADMIN — GABAPENTIN 100 MG: 100 CAPSULE ORAL at 04:01

## 2024-01-01 RX ADMIN — HEPARIN SODIUM AND DEXTROSE 14 UNITS/KG/HR: 10000; 5 INJECTION INTRAVENOUS at 06:02

## 2024-01-01 RX ADMIN — GUAIFENESIN 200 MG: 200 SOLUTION ORAL at 09:02

## 2024-01-01 RX ADMIN — CEFEPIME 2 G: 2 INJECTION, POWDER, FOR SOLUTION INTRAVENOUS at 06:01

## 2024-01-01 RX ADMIN — SODIUM CHLORIDE, POTASSIUM CHLORIDE, SODIUM LACTATE AND CALCIUM CHLORIDE 1000 ML: 600; 310; 30; 20 INJECTION, SOLUTION INTRAVENOUS at 09:01

## 2024-01-01 RX ADMIN — GABAPENTIN 100 MG: 100 CAPSULE ORAL at 02:01

## 2024-01-01 RX ADMIN — VANCOMYCIN HYDROCHLORIDE 750 MG: 750 INJECTION, POWDER, LYOPHILIZED, FOR SOLUTION INTRAVENOUS at 10:02

## 2024-01-01 RX ADMIN — Medication 6 MG: at 09:02

## 2024-01-01 RX ADMIN — HYDROXYCHLOROQUINE SULFATE 200 MG: 200 TABLET, FILM COATED ORAL at 08:02

## 2024-01-01 RX ADMIN — VANCOMYCIN HYDROCHLORIDE 1750 MG: 500 INJECTION, POWDER, LYOPHILIZED, FOR SOLUTION INTRAVENOUS at 09:01

## 2024-01-01 RX ADMIN — PREDNISONE 10 MG: 10 TABLET ORAL at 08:02

## 2024-01-01 RX ADMIN — ATORVASTATIN CALCIUM 40 MG: 40 TABLET, FILM COATED ORAL at 09:02

## 2024-01-01 RX ADMIN — PIPERACILLIN SODIUM AND TAZOBACTAM SODIUM 4.5 G: 4; .5 INJECTION, POWDER, FOR SOLUTION INTRAVENOUS at 09:01

## 2024-01-01 RX ADMIN — HALOPERIDOL LACTATE 2 MG: 5 INJECTION, SOLUTION INTRAMUSCULAR at 09:02

## 2024-01-01 RX ADMIN — DIPHENHYDRAMINE HYDROCHLORIDE 10 ML: 25 SOLUTION ORAL at 12:02

## 2024-01-01 RX ADMIN — APIXABAN 5 MG: 5 TABLET, FILM COATED ORAL at 09:01

## 2024-01-01 RX ADMIN — PANTOPRAZOLE SODIUM 40 MG: 40 TABLET, DELAYED RELEASE ORAL at 08:02

## 2024-01-01 RX ADMIN — GUAIFENESIN 200 MG: 200 SOLUTION ORAL at 08:02

## 2024-01-01 RX ADMIN — ATORVASTATIN CALCIUM 40 MG: 40 TABLET, FILM COATED ORAL at 12:01

## 2024-01-01 RX ADMIN — GUAIFENESIN 200 MG: 200 SOLUTION ORAL at 05:02

## 2024-01-01 RX ADMIN — HYDROXYCHLOROQUINE SULFATE 200 MG: 200 TABLET, FILM COATED ORAL at 09:01

## 2024-01-01 RX ADMIN — APIXABAN 5 MG: 5 TABLET, FILM COATED ORAL at 09:02

## 2024-01-01 RX ADMIN — GUAIFENESIN 200 MG: 200 SOLUTION ORAL at 09:01

## 2024-01-01 RX ADMIN — IOHEXOL 75 ML: 350 INJECTION, SOLUTION INTRAVENOUS at 11:01

## 2024-01-01 RX ADMIN — Medication 100 MG: at 09:01

## 2024-01-01 RX ADMIN — ATORVASTATIN CALCIUM 40 MG: 40 TABLET, FILM COATED ORAL at 08:01

## 2024-01-01 RX ADMIN — GUAIFENESIN 200 MG: 200 SOLUTION ORAL at 02:02

## 2024-01-01 RX ADMIN — GUAIFENESIN 200 MG: 200 SOLUTION ORAL at 03:02

## 2024-01-01 RX ADMIN — QUETIAPINE FUMARATE 25 MG: 25 TABLET ORAL at 09:01

## 2024-01-01 RX ADMIN — FLUTICASONE FUROATE AND VILANTEROL TRIFENATATE 1 PUFF: 100; 25 POWDER RESPIRATORY (INHALATION) at 10:02

## 2024-01-01 RX ADMIN — PANTOPRAZOLE SODIUM 40 MG: 40 TABLET, DELAYED RELEASE ORAL at 09:02

## 2024-01-01 RX ADMIN — ATORVASTATIN CALCIUM 40 MG: 40 TABLET, FILM COATED ORAL at 08:02

## 2024-01-01 RX ADMIN — GABAPENTIN 100 MG: 100 CAPSULE ORAL at 10:01

## 2024-01-01 RX ADMIN — DEXTROSE MONOHYDRATE 250 ML: 100 INJECTION, SOLUTION INTRAVENOUS at 07:01

## 2024-01-01 RX ADMIN — Medication 6 MG: at 08:02

## 2024-01-01 RX ADMIN — Medication 6 MG: at 12:02

## 2024-01-01 RX ADMIN — HEPARIN SODIUM AND DEXTROSE 14 UNITS/KG/HR: 10000; 5 INJECTION INTRAVENOUS at 06:01

## 2024-01-01 RX ADMIN — Medication 100 MG: at 09:02

## 2024-01-01 RX ADMIN — GUAIFENESIN 200 MG: 200 SOLUTION ORAL at 11:02

## 2024-01-01 RX ADMIN — VANCOMYCIN HYDROCHLORIDE 1250 MG: 1.25 INJECTION, POWDER, LYOPHILIZED, FOR SOLUTION INTRAVENOUS at 10:01

## 2024-01-01 RX ADMIN — Medication 100 MG: at 10:01

## 2024-01-01 RX ADMIN — HEPARIN SODIUM AND DEXTROSE 12 UNITS/KG/HR: 10000; 5 INJECTION INTRAVENOUS at 11:01

## 2024-01-01 RX ADMIN — ATORVASTATIN CALCIUM 40 MG: 40 TABLET, FILM COATED ORAL at 10:01

## 2024-01-01 RX ADMIN — GABAPENTIN 100 MG: 100 CAPSULE ORAL at 08:01

## 2024-01-01 RX ADMIN — GABAPENTIN 100 MG: 100 CAPSULE ORAL at 09:01

## 2024-01-01 RX ADMIN — APIXABAN 5 MG: 5 TABLET, FILM COATED ORAL at 12:01

## 2024-01-01 RX ADMIN — PANTOPRAZOLE SODIUM 40 MG: 40 TABLET, DELAYED RELEASE ORAL at 10:01

## 2024-01-01 RX ADMIN — GABAPENTIN 100 MG: 100 CAPSULE ORAL at 03:02

## 2024-01-01 RX ADMIN — QUETIAPINE FUMARATE 25 MG: 25 TABLET ORAL at 12:01

## 2024-01-01 RX ADMIN — HALOPERIDOL LACTATE 2 MG: 5 INJECTION, SOLUTION INTRAMUSCULAR at 02:02

## 2024-01-01 RX ADMIN — PREDNISONE 10 MG: 10 TABLET ORAL at 09:01

## 2024-01-01 RX ADMIN — PREDNISONE 10 MG: 10 TABLET ORAL at 09:02

## 2024-01-01 RX ADMIN — PREDNISONE 10 MG: 10 TABLET ORAL at 10:01

## 2024-01-01 RX ADMIN — VANCOMYCIN HYDROCHLORIDE 750 MG: 750 INJECTION, POWDER, LYOPHILIZED, FOR SOLUTION INTRAVENOUS at 11:01

## 2024-01-01 ASSESSMENT — ROUTINE ASSESSMENT OF PATIENT INDEX DATA (RAPID3)
PSYCHOLOGICAL DISTRESS SCORE: 3.3
TOTAL RAPID3 SCORE: 6.78
PATIENT GLOBAL ASSESSMENT SCORE: 7
WHEN YOU AWAKENED IN THE MORNING OVER THE LAST WEEK, PLEASE INDICATE THE AMOUNT OF TIME IT TAKES UNTIL YOU ARE AS LIMBER AS YOU WILL BE FOR THE DAY: 2
PAIN SCORE: 7
AM STIFFNESS SCORE: 1, YES
MDHAQ FUNCTION SCORE: 1.9
FATIGUE SCORE: 8

## 2024-01-05 NOTE — PROGRESS NOTES
Protocol: The [facility] Effectiveness Study  Identifier: [id number]  IRB#: [id number]  PI: Dr. Adrien Lagos, PhD  CO-I: Dr. Laura Waller PsyD  Version Date: 12/05/2022  Pt Study ID: [id number]  Visit Month: M9  Care Plan documented on: (4/10/23) - Please refer to note for more information.      Timeline:   (*Note for CTN - complete timeline using completed or planned date for study events. Add any important events (i.e. Withdrawals, Lost to Follow Up, Adverse Events, etc.).     Consent: Completed(4/5/23)  Baseline questionnaires: Completed(4/5/23)  6-month questionnaires: Completed (9/2023)  12-month questionnaires: Planned(3/2024)        Visit Note:   SW spoke with Madhavi, pt's daughter for Month 9 visit. --After three attempts this was a missed visit.     Falls in the past month: 0  UTIs in the past month: 1  Hospital encounters in the past month (reported by caregiver): 0        Next monthly visit scheduled for: 1/24/24. Caregiver knows how to reach CTN, and is encouraged to do so, as needed before our next scheduled call.      Action items for CTN: continue to follow

## 2024-01-08 PROBLEM — N17.9 ACUTE KIDNEY INJURY SUPERIMPOSED ON CHRONIC KIDNEY DISEASE: Status: RESOLVED | Noted: 2023-01-01 | Resolved: 2024-01-01

## 2024-01-08 PROBLEM — N18.9 ACUTE KIDNEY INJURY SUPERIMPOSED ON CHRONIC KIDNEY DISEASE: Status: RESOLVED | Noted: 2023-01-01 | Resolved: 2024-01-01

## 2024-01-15 PROBLEM — A41.9 SEPSIS: Status: RESOLVED | Noted: 2023-01-01 | Resolved: 2024-01-01

## 2024-01-15 PROBLEM — I63.531 CEREBROVASCULAR ACCIDENT (CVA) DUE TO STENOSIS OF RIGHT POSTERIOR CEREBRAL ARTERY: Status: RESOLVED | Noted: 2023-01-01 | Resolved: 2024-01-01

## 2024-01-15 PROBLEM — N39.0 UTI (URINARY TRACT INFECTION): Status: RESOLVED | Noted: 2018-12-29 | Resolved: 2024-01-01

## 2024-01-24 NOTE — PROGRESS NOTES
In an attempt to complete monthly [facility] check-in, EBONIE LVM for CG and remains available.  EBONIE will make second attempt 1/29/24

## 2024-01-29 PROBLEM — J18.9 SEPSIS DUE TO PNEUMONIA: Status: ACTIVE | Noted: 2023-01-01

## 2024-01-29 PROBLEM — J44.9 COPD WITHOUT EXACERBATION: Chronic | Status: ACTIVE | Noted: 2023-01-01

## 2024-01-29 PROBLEM — F01.50 VASCULAR DEMENTIA: Status: RESOLVED | Noted: 2017-12-06 | Resolved: 2024-01-01

## 2024-01-29 PROBLEM — D69.6 THROMBOCYTOPENIA: Chronic | Status: RESOLVED | Noted: 2018-12-16 | Resolved: 2024-01-01

## 2024-01-29 PROBLEM — J90 PLEURAL EFFUSION: Chronic | Status: ACTIVE | Noted: 2024-01-01

## 2024-01-29 NOTE — PROGRESS NOTES
Protocol: The [facility] Effectiveness Study  Identifier: [id number]  IRB#: [id number]  PI: Dr. Adrien Lagos, PhD  CO-I: Dr. Laura Waller PsyD  Version Date: 12/05/2022  Pt Study ID: [id number]  Visit Month: M10  Care Plan documented on: (4/10/23) - Please refer to note for more information.      Timeline:   (*Note for CTN - complete timeline using completed or planned date for study events. Add any important events (i.e. Withdrawals, Lost to Follow Up, Adverse Events, etc.).     Consent: Completed(4/5/23)  Baseline questionnaires: Completed(4/5/23)  6-month questionnaires: Completed (9/2023)  12-month questionnaires: Planned(3/2024)        Visit Note:   EBONIE spoke with Madhavi, pt's daughter for Month 10 visit. -- CG said pt continues to have paranoid thoughts. CG said these are not distressing to pt and pt is not a candidate for meds because they effect her sodium in a negative way. Pt currently has pneumonia and is being treated with ABX. There was a discussion regarding whether or not to bring pt home.  SW recommended a palliative care visit. CG will discuss with pt's PCP.     Falls in the past month: 0  UTIs in the past month: 0  Hospital encounters in the past month (reported by caregiver): 0        Next monthly visit scheduled for: 2/21/24. Caregiver knows how to reach CTN, and is encouraged to do so, as needed before our next scheduled call.      Action items for CTN: continue to follow

## 2024-01-29 NOTE — ASSESSMENT & PLAN NOTE
Recent pneumonia for which is on antibiotics  Today has good breath sounds with no wheeze or rhonchi

## 2024-01-29 NOTE — ASSESSMENT & PLAN NOTE
No evidence of rheumatoid arthritis synovitis on current meds so will reduce hydroxychloroquine to once daily while she continues prednisone plus mycophenolate mofetil for pulmonary disease

## 2024-01-29 NOTE — PROGRESS NOTES
1/28/2024     1:38 PM   Rapid3 Question Responses and Scores   MDHAQ Score 1.9   Psychologic Score 3.3   Pain Score 7   When you awakened in the morning OVER THE LAST WEEK, did you feel stiff? Yes   If Yes, please indicate the number of hours until you are as limber as you will be for the day 2   Fatigue Score 8   Global Health Score 7   RAPID3 Score 6.78        Answers submitted by the patient for this visit:  Rheumatology Questionnaire (Submitted on 1/28/2024)  fever: No  eye redness: No  mouth sores: No  headaches: No  shortness of breath: Yes  chest pain: No  trouble swallowing: No  diarrhea: No  constipation: No  unexpected weight change: No  genital sore: No  dysuria: No  During the last 3 days, have you had a skin rash?: No  Bruises or bleeds easily: No  cough: Yes

## 2024-01-29 NOTE — PROGRESS NOTES
"Subjective:       Patient ID: Selma Lux is a 88 y.o. female.    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  Hosp 8/26 with encephalopathy; workup non diagnostic; metabolic encephalopathy vs toxic  March 2020 pneumonia    LV with me April 2023 ; continue prednisone and MMF for   Rheumatoid arthritis on HCQ    Now lives at [facility] under care of Dr Dodson since about Sept 2023    Currently on antibotics for recent pneumonia  No c/o jjoint swelling and on hydroxychloroquine 200 bid, mycophenolate mofetil 500 bid, and prednisone 10 /d  (the latter two for her )    Labs Oct 16 WBC 10.8, Hgb 9.1, plat 168  CR 0.8, alb 2.8  Review of Systems   Constitutional:  Negative for fever and unexpected weight change.   HENT:  Negative for mouth sores and trouble swallowing.    Eyes:  Negative for redness.   Respiratory:  Positive for cough and shortness of breath.    Cardiovascular:  Negative for chest pain.   Gastrointestinal:  Negative for constipation and diarrhea.   Genitourinary:  Negative for dysuria and genital sores.   Skin:  Negative for rash.   Neurological:  Negative for headaches.   Hematological:  Does not bruise/bleed easily.         Objective:   Ht 5' 4" (1.626 m)   Wt 71.7 kg (158 lb 1.1 oz)   LMP  (LMP Unknown)   BMI 27.13 kg/m²      Physical Exam      Assessment:       1. 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 Vascular Dementia    3. Thrombocytopenia    4. Immunocompromised state due to drug therapy    5. High risk medication use            Plan:       Problem List Items Addressed This Visit          Active Problems    Rheumatoid arthritis of multiple sites - Primary (Chronic)     No evidence of rheumatoid arthritis synovitis on current meds so will reduce hydroxychloroquine to once daily while she continues prednisone plus mycophenolate mofetil for pulmonary disease         Relevant Medications    hydroxychloroquine (PLAQUENIL) 200 mg tablet    High risk medication use (Chronic)     If unable to get eye exam then she will need to stop hydroxychloroquine          Vascular Dementia (Chronic)     This is much worse and she is not communicating as used to be able to          Immunocompromised state due to drug therapy     Recent pneumonia for which is on antibiotics  Today has good breath sounds with no wheeze or rhonchi            Resolved Problems    RESOLVED: Thrombocytopenia (Chronic)     This has resolved

## 2024-01-30 PROBLEM — D64.9 ANEMIA: Status: ACTIVE | Noted: 2023-01-01

## 2024-01-30 NOTE — ASSESSMENT & PLAN NOTE
Patient's with Normocytic anemia.. Hemoglobin stable. Etiology likely due to chronic disease .  Current CBC reviewed-    Recent Labs   Lab 01/29/24  1836   HGB 9.4*         Component Value Date/Time    MCV 81 (L) 01/29/2024 1836    RDW 18.8 (H) 01/29/2024 1836    IRON 14 (L) 10/13/2022 0349    FERRITIN 248 10/13/2022 0349    FOLATE 5.2 09/02/2023 1015    [id number] 1328 (H) 09/02/2023 1015     Monitor CBC and transfuse if H/H drops below 7/21.

## 2024-01-30 NOTE — PROGRESS NOTES
[facility] - Emergency Dept  Hospital Medicine  Progress Note    Patient Name: Selma Lux  MRN: [id number]  Patient Class: IP- Inpatient   Admission Date: 1/29/2024  Length of Stay: 1 days  Attending Physician: Tim Castillo MD  Primary Care Provider: Génesis Rosario MD        Subjective:     Principal Problem:Sepsis due to pneumonia        HPI:  Selma Lux is a 88 y.o. female with RA with ILD on Cellcept and prednisone,  history of CVA with resultant vascular dementia, L hemiparesis, and debility/bedbound status, AFib, HTN, HFpEF who presents today with hypoxia.     History is provided by her daughter at bedside as she is unable to provide due to her dementia.     She states that at baseline, her mother has advancing dementia with waxing and waning mental status, which is unfortunately recently progressing. She is bedbound and favors lying to her R side given her L hemiparesis. Last week, she developed congestion and mucous production, so her PCP obtained a CXR which showed PNA. She has been on antibiotics since then, and was placed on oxygen as well.  Today, she was taken to a Rheumatology appointment, and her daughter noted that she appeared more fatigued than normal. After she was brought back to her [facility] home, she reportedly was more tachypnic and hypoxic, therefore she was brought here for evaluation.     She was initially tachycardic, tachypneic, and had large pleural effusion/pneumonia in the right on chest x-ray along with lactic acidosis and leukocytosis.  She was given vancomycin and Zosyn, and hospital medicine consulted for admission.    Per daughter at bedside, she is currently around her baseline mental status, however this waxes and wanes.  Her p.o. intake has been poor recently.    Overview/Hospital Course:  1/30 UA + . UC pending.  increased tachypnea and hypoxia.  Recently diagnosed with pneumonia last week and started on oral antibiotics along with oxygen.  sats  93% on 2LNC. pneumonia vs  chronic interstitial lung disease from rheumatoid arthritis.   Large pleural effusion noted on chest x-ray, CT chest -  Moderate volume right-sided pleural effusion with adjacent compressive atelectasis.  Patchy ground-glass opacities in the aerated lungs suggestive of underlying infectious/inflammatory process. Numerous new nodular opacities scattered within the aerated portions of the lungs, with leading differential diagnostic considerations of infectious/inflammatory process versus malignancy. .  Enlarged multinodular thyroid gland with complex hypoattenuating substernal mass thought to be of thyroid origin.  This mass was present previously but now causes more mass effect, with new narrowing of the right mainstem bronchus.  Malignancy is not excluded. Pulmonology consulted, continue cefepime and vancomycin. Low suspicion for PE as on eliquis. SLP consulted  -recs regular diet. Hold home CellCept .  IR consulted for diagnostic thoracentesis . started on heparin drip .  Goiter continues to enlarge and may one day compromise her airway. palliative care consulted for discussion with daughters.          Review of Systems:   Pain scale: Unable to perform ROS: Dementia    Constitutional:  fever,  chills, headache, vision loss, hearing loss, weight loss, Generalized weakness, falls, loss of smell, loss of taste, poor appetite,  sore throat  Respiratory: cough, shortness of breath.   Cardiovascular: chest pain, dizziness, palpitations, orthopnea, swelling of feet, syncope  Gastrointestinal: nausea, vomiting, abdominal pain, diarrhea, black stool,  blood in stool, change in bowel habits, constipation  Genitourinary: hematuria, dysuria, urgency, frequency  Integument/Breast: rash,  pruritis  Hematologic/Lymphatic: easy bruising, lymphadenopathy  Musculoskeletal: arthralgias , myalgias, back pain, neck pain, knee pain  Neurological: confusion, seizures, tremors, slurred speech, memory  "loss  Behavioral/Psych:  depression, anxiety, auditory or visual hallucinations     OBJECTIVE:     Physical Exam:  Body mass index is 27.12 kg/m².    Constitutional: Appears well-developed and well-nourished.   Head: Normocephalic and atraumatic.   Neck: Normal range of motion. Neck supple.   Cardiovascular: Normal heart rate.  irregular heart rhythm.  Pulmonary/Chest: Effort normal.   Abdominal: No distension.  No tenderness  Musculoskeletal: Normal range of motion. No edema. baseline left hemiparesis   Neurological: Alert and oriented to person,  Skin: Skin is warm and dry.   Psychiatric: Normal mood and affect. Behavior is normal.                  Vital Signs  Temp: 98.1 °F (36.7 °C) (01/30/24 1717)  Pulse: 88 (01/30/24 1717)  Resp: 19 (01/30/24 1717)  BP: (Abnormal) 109/51 (01/30/24 1717)  SpO2: 97 % (01/30/24 1717)     24 Hour VS Range    Temp:  [98 °F (36.7 °C)-98.8 °F (37.1 °C)]   Pulse:  []   Resp:  [14-21]   BP: (103-147)/(51-70)   SpO2:  [93 %-100 %]     Intake/Output Summary (Last 24 hours) at 1/30/2024 1851  Last data filed at 1/30/2024 1709  Gross per 24 hour   Intake 597.78 ml   Output no documentation   Net 597.78 ml         I/O This Shift:  I/O this shift:  In: 97.8 [IV Piggyback:97.8]  Out: -     Wt Readings from Last 3 Encounters:   01/30/24 71.7 kg (158 lb)   01/29/24 71.7 kg (158 lb 1.1 oz)   10/14/23 71.7 kg (158 lb)       I have personally reviewed the vitals and recorded Intake/Output     Laboratory/Diagnostic Data:    CBC/Anemia Labs: Coags:    Recent Labs   Lab 01/29/24  1836 01/30/24  1351   WBC 13.71* 11.58   HGB 9.4* 8.4*   HCT 31.1* 27.0*   * 120*   MCV 81* 79*   RDW 18.8* 18.4*    No results for input(s): "PT", "INR", "APTT" in the last 168 hours.     Chemistries: ABG:   Recent Labs   Lab 01/29/24  1836 01/30/24  1351    135*   K 4.9 3.8   * 109   CO2 14* 19*   BUN 23 18   CREATININE 0.9 0.9   CALCIUM 8.7 8.6*   PROT 6.1 5.3*   BILITOT 1.6* 1.6*   ALKPHOS 57 55 " "  ALT 12 10   AST 18 11   MG  --  1.5*    Recent Labs   Lab 01/29/24  1905   PH 7.423   PCO2 25.9*   PO2 51*   HCO3 17.0*   POCSATURATED 88   BE -7*        POCT Glucose: HbA1c:    No results for input(s): "POCTGLUCOSE" in the last 168 hours. Hemoglobin A1C   Date Value Ref Range Status   06/20/2022 5.3 4.0 - 5.6 % Final     Comment:     ADA Screening Guidelines:  5.7-6.4%  Consistent with prediabetes  >or=6.5%  Consistent with diabetes    High levels of fetal hemoglobin interfere with the HbA1C  assay. Heterozygous hemoglobin variants (HbS, HgC, etc)do  not significantly interfere with this assay.   However, presence of multiple variants may affect accuracy.     12/13/2021 5.3 4.0 - 5.6 % Final     Comment:     ADA Screening Guidelines:  5.7-6.4%  Consistent with prediabetes  >or=6.5%  Consistent with diabetes    High levels of fetal hemoglobin interfere with the HbA1C  assay. Heterozygous hemoglobin variants (HbS, HgC, etc)do  not significantly interfere with this assay.   However, presence of multiple variants may affect accuracy.     08/27/2019 5.6 4.0 - 5.6 % Final     Comment:     ADA Screening Guidelines:  5.7-6.4%  Consistent with prediabetes  >or=6.5%  Consistent with diabetes  High levels of fetal hemoglobin interfere with the HbA1C  assay. Heterozygous hemoglobin variants (HbS, HgC, etc)do  not significantly interfere with this assay.   However, presence of multiple variants may affect accuracy.          Cardiac Enzymes: Ejection Fractions:    Recent Labs     01/29/24  1836   TROPONINI 0.018    EF   Date Value Ref Range Status   10/14/2023 65 % Final   12/16/2022 65 % Final          Recent Labs   Lab 01/30/24  0334   COLORU Yellow   APPEARANCEUA Hazy*   PHUR 6.0   SPECGRAV 1.020   PROTEINUA 1+*   GLUCUA Negative   KETONESU Negative   BILIRUBINUA Negative   OCCULTUA 1+*   NITRITE Negative   LEUKOCYTESUR 3+*   RBCUA 35*   WBCUA 51*   BACTERIA Moderate*   SQUAMEPITHEL 35   HYALINECASTS 0       Procalcitonin " (ng/mL)   Date Value   01/29/2024 0.44 (H)   10/09/2023 0.43 (H)   05/12/2023 0.14   01/23/2022 1.68 (H)   12/22/2021 0.67 (H)     Lactate (Lactic Acid) (mmol/L)   Date Value   01/30/2024 1.2   09/03/2023 1.2   08/31/2023 1.7   10/11/2022 0.7   10/11/2022 0.7     BNP (pg/mL)   Date Value   01/29/2024 176 (H)   10/11/2022 113 (H)   01/23/2022 116 (H)   08/26/2019 70   04/14/2019 142 (H)     CRP (mg/L)   Date Value   04/06/2023 3.9   01/23/2022 254.5 (H)   06/08/2021 4.2   10/27/2020 35.9 (H)   12/09/2019 1.5   02/04/2019 3.7   11/06/2018 7.4   10/23/2018 20.6 (H)   10/08/2018 179.7 (H)   02/12/2018 70.5 (H)     Sed Rate (mm/Hr)   Date Value   05/12/2023 11   04/06/2023 7   01/23/2022 54 (H)   06/08/2021 22   10/27/2020 3   12/09/2019 <2   05/21/2019 5   02/04/2019 13   11/06/2018 6   10/23/2018 16     D-Dimer (mg/L FEU)   Date Value   04/08/2018 3.90 (H)     Ferritin   Date Value   10/13/2022 248 ng/mL   06/21/2022 95 ng/mL   01/23/2022 680 ng/mL (H)   02/28/2018 811 ng/mL (H)   08/24/2017 746 ng/mL (H)   07/28/2017 602 ng/mL (H)   02/02/2017 691 ng/mL (H)   08/05/2016 1,246 ng/mL (H)   06/16/2016 1,875 ng/mL (H)   06/24/2013 121 ng/ml     LD (U/L)   Date Value   01/23/2022 618 (H)     Troponin I (ng/mL)   Date Value   01/29/2024 0.018   08/31/2023 0.021   05/12/2023 0.019   10/11/2022 0.019   06/20/2022 0.006   01/23/2022 0.022   08/28/2019 0.013   08/26/2019 0.007     CPK (U/L)   Date Value   06/20/2022 40   12/13/2021 123   02/28/2020 93   08/28/2019 44   02/27/2012 96     CK (U/L)   Date Value   07/13/2022 92     Results for orders placed or performed in visit on 05/06/22   Vitamin D   Result Value Ref Range    Vit D, 25-Hydroxy 24 (L) 30 - 96 ng/mL   Results for orders placed or performed in visit on 04/08/21   Vitamin D   Result Value Ref Range    Vit D, 25-Hydroxy 19 (L) 30 - 96 ng/mL     *Note: Due to a large number of results and/or encounters for the requested time period, some results have not been  displayed. A complete set of results can be found in Results Review.     SARS-CoV2 (COVID-19) Qualitative PCR (no units)   Date Value   01/29/2024 Not Detected   10/16/2023 Not Detected   09/11/2023 Not Detected   05/13/2023 Not Detected   05/12/2023 Not Detected   01/11/2023 Not Detected   04/07/2020 Not Detected     SARS-CoV-2 RNA, Amplification, Qual (no units)   Date Value   08/31/2023 Negative   10/11/2022 Negative     POC Rapid COVID (no units)   Date Value   08/30/2022 Negative   06/20/2022 Negative   01/23/2022 Positive (A)   12/13/2021 Negative       Microbiology labs for the last week  Microbiology Results (last 7 days)       Procedure Component Value Units Date/Time    Aerobic culture [[id number]]     Order Status: No result Specimen: Pleural Fluid     Culture, Anaerobic [[id number]]     Order Status: No result Specimen: Pleural Fluid     Gram stain [[id number]]     Order Status: No result Specimen: Pleural Fluid     AFB Culture & Smear [[id number]]     Order Status: No result Specimen: Pleural Fluid     Fungus culture [[id number]]     Order Status: No result Specimen: Pleural Fluid     Urine culture [[id number]] Collected: 01/30/24 0334    Order Status: No result Specimen: Urine Updated: 01/30/24 0425    Blood culture x two cultures. Draw prior to antibiotics. [[id number]] Collected: 01/29/24 1843    Order Status: Completed Specimen: Blood from Peripheral, Forearm, Right Updated: 01/30/24 0145     Blood Culture, Routine No Growth to date    Narrative:      Aerobic and anaerobic    Blood culture x two cultures. Draw prior to antibiotics. [[id number]] Collected: 01/29/24 1835    Order Status: Completed Specimen: Blood from Peripheral, Hand, Right Updated: 01/30/24 0145     Blood Culture, Routine No Growth to date    Narrative:      Aerobic and anaerobic    Culture, MRSA [[id number]]     Order Status: No result Specimen: MRSA source from Nares, Right     Influenza A & B by Molecular [[id number]] Collected:  01/29/24 1838    Order Status: Completed Specimen: Nasopharyngeal Swab Updated: 01/29/24 1931     Influenza A, Molecular Negative     Influenza B, Molecular Negative     Flu A & B Source NP            Reviewed and noted in plan where applicable- Please see chart for full lab data.    Lines/Drains:       Peripheral IV - Single Lumen 01/29/24 1832 20 G Anterior;Right Hand (Active)   Site Assessment Clean;Dry;Intact 01/29/24 1832   Extremity Assessment Distal to IV No abnormal discoloration;No redness;No swelling 01/29/24 1832   Dressing Status Clean;Dry;Intact 01/29/24 1832   Dressing Intervention Integrity maintained 01/29/24 1832   Number of days: 0       Imaging  ECG Results              EKG 12-lead (Final result)  Result time 01/30/24 12:45:00      Final result by Interface, Lab In [facility] (01/30/24 12:45:00)               Narrative:    Test Reason : R09.02,    Vent. Rate : 121 BPM     Atrial Rate : 121 BPM     P-R Int : 128 ms          QRS Dur : 072 ms      QT Int : 306 ms       P-R-T Axes : 017 044 007 degrees     QTc Int : 434 ms    Baseline Artifact  Sinus tachycardia with frequent Premature ventricular complexes  Nonspecific T wave abnormality  Abnormal ECG  When compared with ECG of 11-OCT-2023 15:44,  The axis Shifted right  Nonspecific T wave abnormality, worse in Inferior leads    Confirmed by Charles Tucker MD ([id number]) on 1/30/2024 12:44:49 PM    Referred By: [email or web address]   SELF           Confirmed By:Charles Tucker MD                                  Results for orders placed during the hospital encounter of 10/09/23    Echo    Interpretation Summary    Left Ventricle: The left ventricle is normal in size. Ventricular mass is normal. Normal wall thickness. There is concentric remodeling. Normal wall motion. There is normal systolic function. Ejection fraction by visual approximation is 65%. Grade II diastolic dysfunction.    Left Atrium: Left atrium is severely dilated.    Right Ventricle: Normal right ventricular  cavity size. Wall thickness is normal. Right ventricle wall motion  is normal. Systolic function is normal.    Aortic Valve: The aortic valve is a trileaflet valve. There is moderate aortic valve sclerosis. There is mild annular calcification present. There is restricted motion of the left coronary cusp.    Mitral Valve: There is bileaflet sclerosis. Moderately calcified subvalvular apparatus. Mildly restricted motion. There is mild to moderate regurgitation.    Tricuspid Valve: There is mild regurgitation.    Pulmonary Artery: The estimated pulmonary artery systolic pressure is 42 mmHg.    IVC/SVC: Normal venous pressure at 3 mmHg.      CT Chest With Contrast  Narrative: EXAMINATION:  CT CHEST WITH CONTRAST    CLINICAL HISTORY:  Dyspnea, chronic, chest wall or pleura disease suspected;pleural effusion;    TECHNIQUE:  Low dose axial images, sagittal and coronal reformations were obtained from the thoracic inlet to the lung bases following the IV administration of 75 mL of Omnipaque 350.    COMPARISON:  Chest radiograph 01/29/2024, 10/09/2023    CT renal stone study 09/05/2023    FINDINGS:  Motion degraded study.    Base of Neck: Enlarged multinodular thyroid gland with a large heterogeneously hypoattenuating substernal component that exerts mass effect upon and causes mild narrowing of the trachea and right mainstem bronchus.  This lesion contains internal calcifications and measures approximately 5.7 x 4.9 cm in axial dimension (series 2, image 38), and approximately 5.8 cm in craniocaudal extent.    Thoracic soft tissues: Unremarkable.    Thoracic aorta: Left-sided aortic arch.  No aneurysm.  Mild calcific atherosclerosis    Heart: Heart is enlarged.    Pulmonary vasculature: Pulmonary arteries distribute normally.  Artifacts make it difficult to exclude segmental pulmonary thromboembolism in the right lower lobe.  There are four pulmonary veins.    Sowmya/Mediastinum: No pathologic sondra enlargement.    Airways:  Markedly narrowed right mainstem bronchus secondary to extrinsic mass effect from the substernal thyroid mass.    Lungs/Pleura: Moderate volume right-sided pleural effusion atelectasis in adjacent portions of the lungs could be on the basis of both compression from the pleural fluid and postobstructive phenomena from the narrowed (though not obstructed) right mainstem bronchus, progressed from prior study.  Patchy ground-glass opacities in the aerated portions of the lungs.    Numerous nodular opacity scattered throughout the aerated lungs are new since the CT of 09/05/2023.    Esophagus: Unremarkable.    Upper Abdomen: No abnormality of the partially imaged upper abdomen.    Bones: Diffuse degenerative changes of the visualized osseous structures.  No acute fracture.  Impression: 1. Moderate volume right-sided pleural effusion with adjacent compressive atelectasis.  Patchy ground-glass opacities in the aerated lungs suggestive of underlying infectious/inflammatory process.  2. Numerous new nodular opacities scattered within the aerated portions of the lungs, with leading differential diagnostic considerations of infectious/inflammatory process versus malignancy.  Correlate clinically, and with follow-up imaging.  3. Enlarged multinodular thyroid gland with complex hypoattenuating substernal mass thought to be of thyroid origin.  This mass was present previously but now causes more mass effect, with new narrowing of the right mainstem bronchus.  Malignancy is not excluded.  Recommend nonemergent outpatient thyroid ultrasound.  4. Artifacts make it difficult to exclude segmental thromboembolism in the right lower lobe.  Correlate clinically.  Consider follow-up chest CTA if there is strong clinical suspicion for thromboembolism.  Additional findings as detailed above.  This report was flagged in Epic as abnormal.    Electronically signed by resident: Charlotte  Ernestine  Date:    01/30/2024  Time:    02:38    Electronically signed by: Patricio Rea  Date:    01/30/2024  Time:    04:17      Labs, Imaging, EKG and Diagnostic results from 1/30/2024 were reviewed.    Medications:  Medication list was reviewed and changes noted under Assessment/Plan.  No current facility-administered medications on file prior to encounter.     Current Outpatient Medications on File Prior to Encounter   Medication Sig Dispense Refill    albuterol-ipratropium (DUO-NEB) 2.5 mg-0.5 mg/3 mL nebulizer solution Take 3 mLs by nebulization 2 (two) times daily as needed for Shortness of Breath. Rescue 75 mL 2    amoxicillin-clavulanate 875-125mg (AUGMENTIN) 875-125 mg per tablet Take 1 tablet by mouth 2 (two) times daily.      apixaban (ELIQUIS) 5 mg Tab Take 1 tablet (5 mg total) by mouth 2 (two) times daily. 180 tablet 3    atorvastatin (LIPITOR) 40 MG tablet Take 1 tablet (40 mg total) by mouth every evening. 90 tablet 3    baclofen (LIORESAL) 5 mg Tab tablet Take 1 tablet (5 mg total) by mouth 3 (three) times daily.      cholecalciferol, vitamin D3, 125 mcg (5,000 unit) capsule Take 1 capsule (5,000 Units total) by mouth once daily. 90 capsule 3    famotidine (PEPCID) 20 MG tablet Take 20 mg by mouth.      ferrous sulfate, dried (SLOW FE) 160 mg (50 mg iron) TbSR Take 1 tablet (160 mg total) by mouth every other day.      fluticasone-salmeterol diskus inhaler 100-50 mcg Inhale 1 puff into the lungs 2 (two) times daily. Controller 1 each 2    gabapentin (NEURONTIN) 100 MG capsule Take 1 capsule (100 mg total) by mouth 3 (three) times daily. 60 capsule 6    gabapentin 5% baclofen 2% amitriptyline 2% topical cream Apply topically 3 (three) times daily. Apply to shoulders and neck for torticollis 240 g 2    hydroxychloroquine (PLAQUENIL) 200 mg tablet Take 1 tablet (200 mg total) by mouth once daily. 90 tablet 3    LIDOcaine (LIDODERM) 5 % Place 1 patch onto the skin once daily. Remove & Discard patch  within 12 hours as needed for pain or as directed by MD Place patch onto lower back as needed 60 patch 11    magnesium oxide (MAG-OX) 400 mg (241.3 mg magnesium) tablet Take 400 mg by mouth once daily.      mycophenolate (CELLCEPT) 500 mg Tab Take 1 tablet (500 mg total) by mouth 2 (two) times daily. 180 tablet 3    pantoprazole (PROTONIX) 40 MG tablet Take 1 tablet (40 mg total) by mouth once daily. 30 tablet 5    predniSONE (DELTASONE) 10 MG tablet Take 10 mg by mouth once daily.      QUEtiapine (SEROQUEL) 25 MG Tab Take 1 tablet (25 mg total) by mouth every evening. Patient may request 1/2 tablet rather than full 25mg nightly. 30 tablet 11    sulfamethoxazole-trimethoprim 800-160mg (BACTRIM DS) 800-160 mg Tab One tablet by mouth every Monday, Wednesday, Friday to prevent lung infection 36 tablet 3    thiamine 100 MG tablet Take 100 mg by mouth once daily.      [DISCONTINUED] calcium carbonate (TUMS) 200 mg calcium (500 mg) chewable tablet Take 2 tablets (1,000 mg total) by mouth once daily. (Patient not taking: No sig reported) 60 tablet 11    [DISCONTINUED] lacosamide (VIMPAT) 10 mg/mL Soln oral solution Take 10 mLs (100 mg total) by mouth every 12 (twelve) hours. (Patient not taking: No sig reported) 600 mL 11     Scheduled Medications:  atorvastatin, 40 mg, Oral, QHS  ceFEPime IV (PEDS and ADULTS), 2 g, Intravenous, Q12H  fluticasone furoate-vilanteroL, 1 puff, Inhalation, Daily  gabapentin, 100 mg, Oral, TID  heparin (PORCINE), 60 Units/kg (Adjusted), Intravenous, Once  hydroxychloroquine, 200 mg, Oral, Daily  pantoprazole, 40 mg, Oral, Daily  predniSONE, 10 mg, Oral, Daily  thiamine, 100 mg, Oral, Daily  vancomycin (VANCOCIN) IV (PEDS and ADULTS), 1,250 mg, Intravenous, Q24H      PRN: acetaminophen, acetaminophen, albuterol-ipratropium, aluminum-magnesium hydroxide-simethicone, heparin (PORCINE), heparin (PORCINE), melatonin, naloxone, ondansetron, polyethylene glycol, QUEtiapine, simethicone, sodium  chloride 0.9%, Pharmacy to dose Vancomycin consult **AND** vancomycin - pharmacy to dose  Infusions:    heparin (porcine) in D5W       Estimated Creatinine Clearance: 43.6 mL/min (based on SCr of 0.9 mg/dL).           Assessment/Plan:      * Sepsis due to pneumonia  Meets sepsis criteria likely secondary to pneumonia.  Initial lactic acidosis improving with IV fluids.  Blood cultures collected; we will continue vancomycin and cefepime for now given concern for focal pneumonia, and we will follow.  Tailor antibiotics as appropriate.  Obtain CT chest as below.    This patient does have evidence of infective focus  My overall impression is sepsis.  Source: Respiratory  Antibiotics given-   Antibiotics (72h ago, onward)      Start     Stop Route Frequency Ordered    01/30/24 2200  vancomycin 1,250 mg in dextrose 5 % (D5W) 250 mL IVPB (Vial-Mate)         -- IV Every 24 hours (non-standard times) 01/29/24 2245    01/30/24 0600  ceFEPIme (MAXIPIME) 2 g in dextrose 5 % in water (D5W) 100 mL IVPB (MB+)         -- IV Every 12 hours (non-standard times) 01/29/24 2235    01/29/24 2335  vancomycin - pharmacy to dose  (vancomycin IVPB (PEDS and ADULTS))        See Hyperspace for full Linked Orders Report.    -- IV pharmacy to manage frequency 01/29/24 2235          Latest lactate reviewed-  Recent Labs   Lab 01/29/24  2157   POCLAC 1.54       Organ dysfunction indicated by Acute respiratory failure    Fluid challenge Not needed - patient is not hypotensive      Post- resuscitation assessment No - Post resuscitation assessment not needed       Will Not start Pressors- Levophed for MAP of 65    Pleural effusion  Large pleural effusion noted on chest x-ray, which may be secondary to pneumonia.  No overt evidence of volume overload to suggest heart failure.  We will attempt to obtain CT chest for better characterization; may require thoracentesis.  Continue antibiotics.  1/30   Recently diagnosed with pneumonia last week and started  on oral antibiotics along with oxygen.  sats 93% on 2LNC. pneumonia vs  chronic interstitial lung disease from rheumatoid arthritis.   Large pleural effusion noted on chest x-ray, CT chest -  Moderate volume right-sided pleural effusion with adjacent compressive atelectasis.  Patchy ground-glass opacities in the aerated lungs suggestive of underlying infectious/inflammatory process. Numerous new nodular opacities scattered within the aerated portions of the lungs, with leading differential diagnostic considerations of infectious/inflammatory process versus malignancy. .  Enlarged multinodular thyroid gland with complex hypoattenuating substernal mass thought to be of thyroid origin.  This mass was present previously but now causes more mass effect, with new narrowing of the right mainstem bronchus.  Malignancy is not excluded. Pulmonology consulted, continue cefepime and vancomycin. IR consulted for diagnostic thoracentesis .      Anemia    Patient's with Normocytic anemia.. Hemoglobin stable. Etiology likely due to chronic disease .  Current CBC reviewed-    Recent Labs   Lab 01/29/24  1836   HGB 9.4*         Component Value Date/Time    MCV 81 (L) 01/29/2024 1836    RDW 18.8 (H) 01/29/2024 1836    IRON 14 (L) 10/13/2022 0349    FERRITIN 248 10/13/2022 0349    FOLATE 5.2 09/02/2023 1015    UAPCUADV23 1328 (H) 09/02/2023 1015     Monitor CBC and transfuse if H/H drops below 7/21.      COPD without exacerbation  Currently without evidence of acute hypercapnic respiratory failure, and no wheezing on exam.  We will continue home inhalers.  1/30  sats 93% on 2LNC.    Heart failure with preserved ejection fraction  No evidence of acute exacerbation.  We will monitor volume status.  Patient admitted with signs and symptoms of CHF exacerbation. Continue with IV lasix.    Results for orders placed or performed during the hospital encounter of 01/29/24   Brain natriuretic peptide   Result Value Ref Range     (H) 0 -  99 pg/mL    serial troponins.  Cardiac Enzymes trend  Recent Labs   Lab 01/29/24  1836   TROPONINI 0.018     Results for orders placed during the hospital encounter of 10/09/23    Echo    Interpretation Summary    Left Ventricle: The left ventricle is normal in size. Ventricular mass is normal. Normal wall thickness. There is concentric remodeling. Normal wall motion. There is normal systolic function. Ejection fraction by visual approximation is 65%. Grade II diastolic dysfunction.    Left Atrium: Left atrium is severely dilated.    Right Ventricle: Normal right ventricular cavity size. Wall thickness is normal. Right ventricle wall motion  is normal. Systolic function is normal.    Aortic Valve: The aortic valve is a trileaflet valve. There is moderate aortic valve sclerosis. There is mild annular calcification present. There is restricted motion of the left coronary cusp.    Mitral Valve: There is bileaflet sclerosis. Moderately calcified subvalvular apparatus. Mildly restricted motion. There is mild to moderate regurgitation.    Tricuspid Valve: There is mild regurgitation.    Pulmonary Artery: The estimated pulmonary artery systolic pressure is 42 mmHg.    IVC/SVC: Normal venous pressure at 3 mmHg.         Vascular Dementia  Per daughter at bedside, recently with worsening dementia with waxing and waning mental status.  We will continue home Seroquel nightly along with delirium precautions.  1/30 CT head -No acute intracranial process.  Significant motion artifact may diminish the sensitivity. Involutional changes with chronic microvascular ischemic changes.  Small remote lacunar infarct of the right cerebellum and right corona radiata.    Immunocompromised state due to drug therapy  1/30 continue plaquenil.  Hold home CellCept .       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  Continue supportive care and frequent  turns.      Multinodular goiter  1/30  CT chest - .  Enlarged multinodular thyroid gland with complex hypoattenuating substernal mass thought to be of thyroid origin.  This mass was present previously but now causes more mass effect, with new narrowing of the right mainstem bronchus.  Malignancy is not excluded. Pulmonology consulted, TSH WNL .  Goiter continues to enlarge and may one day compromise her airway. palliative care consulted for discussion with daughters.    UTI (urinary tract infection)  1/30 UA + . UC pending. continue cefepime    Thrombocytopenia  Patient was found to have thrombocytopenia, monitor .  Recent Labs   Lab 01/29/24  1836   *         AF (paroxysmal atrial fibrillation)  Currently rate controlled.  Continue home Eliquis.    Oropharyngeal dysphagia  Noted in the past, however not on specific dysphagia diet per daughter.  Her right sided pneumonia may be secondary to aspiration as she is at high risk for this.  May consider speech therapy consultation.  1/30 SLP consulted to rule out aspiration        Acute respiratory failure with hypoxia  Presents from her [facility] center with increased tachypnea and hypoxia.  Recently diagnosed with pneumonia last week and started on oral antibiotics along with oxygen.  PO2 is decreased on ABG, however appears to be chronically decreased.  Suspect pneumonia on chronic interstitial lung disease from rheumatoid arthritis.  We will continue oxygen to maintain sats 88% or greater.  Low suspicion for PE given chronic Eliquis use.  1/30  Recently diagnosed with pneumonia last week and started on oral antibiotics along with oxygen.  sats 93% on 2LNC. pneumonia vs  chronic interstitial lung disease from rheumatoid arthritis.   Large pleural effusion noted on chest x-ray, CT chest -  Moderate volume right-sided pleural effusion with adjacent compressive atelectasis.     Rheumatoid arthritis of multiple sites  Continue home Plaquenil and prednisone.  Hold  home CellCept given acute infection.        VTE Risk Mitigation (From admission, onward)           Ordered     heparin 25,000 units in dextrose 5% (100 units/ml) IV bolus from bag - ADDITIONAL PRN BOLUS - 60 units/kg  As needed (PRN)        Question:  Heparin Infusion Adjustment (DO NOT MODIFY ANSWER)  Answer:  [email or web address]\epic\Images\Pharmacy\HeparinInfusions\heparin LOW INTENSITY nomogram for [facility] [id number]    01/30/24 1844     heparin 25,000 units in dextrose 5% (100 units/ml) IV bolus from bag - ADDITIONAL PRN BOLUS - 30 units/kg  As needed (PRN)        Question:  Heparin Infusion Adjustment (DO NOT MODIFY ANSWER)  Answer:  [email or web address]\epic\Images\Pharmacy\HeparinInfusions\heparin LOW INTENSITY nomogram for [facility] [id number]    01/30/24 1844     heparin 25,000 units in dextrose 5% (100 units/ml) IV bolus from bag INITIAL BOLUS  Once        Question:  Heparin Infusion Adjustment (DO NOT MODIFY ANSWER)  Answer:  [email or web address]\epic\Images\Pharmacy\HeparinInfusions\heparin LOW INTENSITY nomogram for [facility] [id number]    01/30/24 1844     heparin 25,000 units in dextrose 5% 250 mL (100 units/mL) infusion LOW INTENSITY nomogram - [facility]  Continuous        Question:  Begin at (units/kg/hr)  Answer:  12    01/30/24 1844                    Discharge Planning   LETICIA: 2/2/2024     Code Status: Full Code   Is the patient medically ready for discharge?: (No Documentation)    Reason for patient still in hospital (select all that apply): Treatment                     Tim Castillo MD  Department of Hospital Medicine   [facility] - Emergency Dept

## 2024-01-30 NOTE — ASSESSMENT & PLAN NOTE
Presents from her [facility] center with increased tachypnea and hypoxia.  Recently diagnosed with pneumonia last week and started on oral antibiotics along with oxygen.  PO2 is decreased on ABG, however appears to be chronically decreased.  Suspect pneumonia on chronic interstitial lung disease from rheumatoid arthritis.  We will continue oxygen to maintain sats 88% or greater.  Low suspicion for PE given chronic Eliquis use.  1/30  Recently diagnosed with pneumonia last week and started on oral antibiotics along with oxygen.  sats 93% on 2LNC. pneumonia vs  chronic interstitial lung disease from rheumatoid arthritis.   Large pleural effusion noted on chest x-ray, CT chest -  Moderate volume right-sided pleural effusion with adjacent compressive atelectasis.

## 2024-01-30 NOTE — ASSESSMENT & PLAN NOTE
Large pleural effusion noted on chest x-ray, which may be secondary to pneumonia.  No overt evidence of volume overload to suggest heart failure.  We will attempt to obtain CT chest for better characterization; may require thoracentesis.  Continue antibiotics.  1/30   Recently diagnosed with pneumonia last week and started on oral antibiotics along with oxygen.  sats 93% on 2LNC. pneumonia vs  chronic interstitial lung disease from rheumatoid arthritis.   Large pleural effusion noted on chest x-ray, CT chest -  Moderate volume right-sided pleural effusion with adjacent compressive atelectasis.  Patchy ground-glass opacities in the aerated lungs suggestive of underlying infectious/inflammatory process. Numerous new nodular opacities scattered within the aerated portions of the lungs, with leading differential diagnostic considerations of infectious/inflammatory process versus malignancy. .  Enlarged multinodular thyroid gland with complex hypoattenuating substernal mass thought to be of thyroid origin.  This mass was present previously but now causes more mass effect, with new narrowing of the right mainstem bronchus.  Malignancy is not excluded. Pulmonology consulted, continue cefepime and vancomycin.

## 2024-01-30 NOTE — ED TRIAGE NOTES
Patient presents to the ED via EMS from [facility] for RA sat 85%, arrives on 97% 3L O2 NC, on abx for pneumonia; +tachypneic, a fib rvr 140s, significant cardiac hx. PT at baseline per nursing home. Patient not answering questions at this time.

## 2024-01-30 NOTE — ASSESSMENT & PLAN NOTE
Noted in the past, however not on specific dysphagia diet per daughter.  Her right sided pneumonia may be secondary to aspiration as she is at high risk for this.  May consider speech therapy consultation.  1/30 SLP consulted to rule out aspiration

## 2024-01-30 NOTE — PROGRESS NOTES
"Pharmacokinetic Initial Assessment: IV Vancomycin    Assessment/Plan:    Initiate intravenous vancomycin with loading dose of 1750 mg once, done in ED, followed by a maintenance dose of vancomycin 1250 mg IV every 24 hours.  Desired empiric serum trough concentration is 15 to 20 mcg/mL  Draw vancomycin trough level 60 min prior to third dose on 01/31/2024 at 2100.  Pharmacy will continue to follow and monitor vancomycin.      Please contact pharmacy at extension [phone] with any questions regarding this assessment.     Thank you for the consult,   Concepcion Bull       Patient brief summary:  Selma Lux is a 88 y.o. female initiated on antimicrobial therapy with IV Vancomycin for treatment of suspected lower respiratory infection.    Drug Allergies:   Review of patient's allergies indicates:  No Known Allergies    Actual Body Weight:   71.7 kg    Renal Function:   Estimated Creatinine Clearance: 43.6 mL/min (based on SCr of 0.9 mg/dL).    CBC (last 72 hours):  Recent Labs   Lab Result Units 01/29/24  1836   WBC K/uL 13.71*   Hemoglobin g/dL 9.4*   Hematocrit % 31.1*   Platelets K/uL 136*   Gran % % 56.2   Lymph % % 10.0*   Mono % % 31.0*   Eosinophil % % 1.3   Basophil % % 0.3   Differential Method  Automated       Metabolic Panel (last 72 hours):  Recent Labs   Lab Result Units 01/29/24  1836   Sodium mmol/L 140   Potassium mmol/L 4.9   Chloride mmol/L 112*   CO2 mmol/L 14*   Glucose mg/dL 55*   BUN mg/dL 23   Creatinine mg/dL 0.9   Albumin g/dL 2.9*   Total Bilirubin mg/dL 1.6*   Alkaline Phosphatase U/L 57   AST U/L 18   ALT U/L 12       Drug levels (last 3 results):  No results for input(s): "VANCOMYCINRA", "VANCORANDOM", "VANCOMYCINPE", "VANCOPEAK", "VANCOMYCINTR", "VANCOTROUGH" in the last 72 hours.    Microbiologic Results:  Microbiology Results (last 7 days)       Procedure Component Value Units Date/Time    Culture, MRSA [[id number]]     Order Status: No result Specimen: MRSA source from Nares, " Right     Influenza A & B by Molecular [[id number]] Collected: 01/29/24 1838    Order Status: Completed Specimen: Nasopharyngeal Swab Updated: 01/29/24 1931     Influenza A, Molecular Negative     Influenza B, Molecular Negative     Flu A & B Source NP    Blood culture x two cultures. Draw prior to antibiotics. [[id number]] Collected: 01/29/24 1843    Order Status: Sent Specimen: Blood from Peripheral, Forearm, Right Updated: 01/29/24 1851    Blood culture x two cultures. Draw prior to antibiotics. [[id number]] Collected: 01/29/24 1835    Order Status: Sent Specimen: Blood from Peripheral, Hand, Right Updated: 01/29/24 1844

## 2024-01-30 NOTE — SUBJECTIVE & OBJECTIVE
Past Medical History:   Diagnosis Date    Acute cystitis without hematuria 2/27/2020    Acute hypoxemic respiratory failure 4/11/2018    Acute respiratory failure with hypoxia 3/2/2020    KERMIT (acute kidney injury) 3/13/2019    Altered mental status     Anemia     Arthritis     Bilateral hand pain 3/14/2018    Branch retinal vein occlusion, left eye 2/20/2015    Chronic bilateral low back pain without sciatica 3/23/2017    Chronic renal failure in pediatric patient, stage 3 (moderate) 4/15/2018    Chronic renal failure syndrome, stage 3 (moderate) 4/15/2018    Chronic systolic (congestive) heart failure 8/6/2021    Last Assessment & Plan:  Formatting of this note might be different from the original. 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 Hospitalization late 2021, w/ rehab to follow (no notes available) --12/13/2021-CT head revealed thin extra-axial hyperdense collection overlying the right cerebral convexity compatible with acute subdural hematoma, neurosurgery was consulted, patient was noted with Keppra 500 mg b.i.d., apixaban was held -12/19/    Iron deficiency anemia secondary to inadequate dietary iron intake 6/24/2013    Joint pain      Keratoconjunctivitis sicca of both eyes not specified as Sjogren's 7/29/2016    Leiomyoma of uterus, unspecified 9/16/2014    Long QT interval 6/29/2016    Due to medication (plaquenil)     Macular edema 1/12/2015    Multinodular goiter 1/12/2016    Neuropathy 8/23/2017    Plaquenil causing adverse effect in therapeutic use 10/7/2016    Pneumococcal vaccine refused 8/17/2016    Pneumonia due to Streptococcus pneumoniae 4/5/2018    Poor nutrition 12/23/2018    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/2015    RA (rheumatoid arthritis) 12/13/2021    -managed by rhematology, since this is a duplicate problem list entry, will archive this     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       Past Surgical History:   Procedure Laterality Date    BREAST CYST EXCISION      CATARACT EXTRACTION      COLONOSCOPY N/A 9/29/2015    Procedure: COLONOSCOPY;  Surgeon: FIDELINA Sanchez MD;  Location: [facility] ENDO ([address] [place] [address]);  Service: Endoscopy;  Laterality: N/A;    EYE SURGERY      INJECTION OF ANESTHETIC AGENT AROUND NERVE Left 4/19/2021    Procedure: BLOCK, NERVE, FEMORAL AND OBTURATOR;  Surgeon: Shivam Gonzalez MD;  Location: [facility] PAIN [facility]GT;  Service: Pain Management;  Laterality: Left;  consent needed    JOINT REPLACEMENT      right knee    KNEE SURGERY Left 12/31/2013    TKR    left parotidectomy      mixed tumor    SC EVAL,SWALLOW FUNCTION,CINE/VIDEO RECORD  6/5/2021         SALIVARY GLAND SURGERY      TONSILLECTOMY      UPPER GASTROINTESTINAL ENDOSCOPY         Review of patient's allergies indicates:  No Known Allergies    No current facility-administered medications on file  prior to encounter.     Current Outpatient Medications on File Prior to Encounter   Medication Sig    albuterol-ipratropium (DUO-NEB) 2.5 mg-0.5 mg/3 mL nebulizer solution Take 3 mLs by nebulization 2 (two) times daily as needed for Shortness of Breath. Rescue    amoxicillin-clavulanate 875-125mg (AUGMENTIN) 875-125 mg per tablet Take 1 tablet by mouth 2 (two) times daily.    apixaban (ELIQUIS) 5 mg Tab Take 1 tablet (5 mg total) by mouth 2 (two) times daily.    atorvastatin (LIPITOR) 40 MG tablet Take 1 tablet (40 mg total) by mouth every evening.    baclofen (LIORESAL) 5 mg Tab tablet Take 1 tablet (5 mg total) by mouth 3 (three) times daily.    cholecalciferol, vitamin D3, 125 mcg (5,000 unit) capsule Take 1 capsule (5,000 Units total) by mouth once daily.    famotidine (PEPCID) 20 MG tablet Take 20 mg by mouth.    ferrous sulfate, dried (SLOW FE) 160 mg (50 mg iron) TbSR Take 1 tablet (160 mg total) by mouth every other day.    fluticasone-salmeterol diskus inhaler 100-50 mcg Inhale 1 puff into the lungs 2 (two) times daily. Controller    gabapentin (NEURONTIN) 100 MG capsule Take 1 capsule (100 mg total) by mouth 3 (three) times daily.    gabapentin 5% baclofen 2% amitriptyline 2% topical cream Apply topically 3 (three) times daily. Apply to shoulders and neck for torticollis    hydroxychloroquine (PLAQUENIL) 200 mg tablet Take 1 tablet (200 mg total) by mouth once daily.    LIDOcaine (LIDODERM) 5 % Place 1 patch onto the skin once daily. Remove & Discard patch within 12 hours as needed for pain or as directed by MD Place patch onto lower back as needed    magnesium oxide (MAG-OX) 400 mg (241.3 mg magnesium) tablet Take 400 mg by mouth once daily.    mycophenolate (CELLCEPT) 500 mg Tab Take 1 tablet (500 mg total) by mouth 2 (two) times daily.    pantoprazole (PROTONIX) 40 MG tablet Take 1 tablet (40 mg total) by mouth once daily.    predniSONE (DELTASONE) 10 MG tablet Take 10 mg by mouth once daily.     QUEtiapine (SEROQUEL) 25 MG Tab Take 1 tablet (25 mg total) by mouth every evening. Patient may request 1/2 tablet rather than full 25mg nightly.    sulfamethoxazole-trimethoprim 800-160mg (BACTRIM DS) 800-160 mg Tab One tablet by mouth every Monday, Wednesday, Friday to prevent lung infection    thiamine 100 MG tablet Take 100 mg by mouth once daily.    [DISCONTINUED] calcium carbonate (TUMS) 200 mg calcium (500 mg) chewable tablet Take 2 tablets (1,000 mg total) by mouth once daily. (Patient not taking: No sig reported)    [DISCONTINUED] hydrOXYchloroQUINE (PLAQUENIL) 200 mg tablet Take 1 tablet (200 mg total) by mouth 2 (two) times daily.    [DISCONTINUED] lacosamide (VIMPAT) 10 mg/mL Soln oral solution Take 10 mLs (100 mg total) by mouth every 12 (twelve) hours. (Patient not taking: No sig reported)     Family History       Problem Relation (Age of Onset)    Breast cancer Maternal Grandmother    Cancer Father    Heart disease Mother    Hypertension Mother    Hyperthyroidism Mother, Other    Prostate cancer Father          Tobacco Use    Smoking status: Never     Passive exposure: Never    Smokeless tobacco: Never   Substance and Sexual Activity    Alcohol use: Yes     Alcohol/week: 0.0 standard drinks of alcohol     Comment: very seldom     Drug use: No    Sexual activity: Not Currently     Comment: ,  age 52,      Review of Systems   Unable to perform ROS: Dementia     Objective:     Vital Signs (Most Recent):  Temp: 98.8 °F (37.1 °C) (01/29/24 1807)  Pulse: 95 (01/29/24 2235)  Resp: 20 (01/29/24 2114)  BP: (!) 123/58 (01/29/24 2232)  SpO2: (!) 94 % (01/29/24 2232) Vital Signs (24h Range):  Temp:  [98.8 °F (37.1 °C)] 98.8 °F (37.1 °C)  Pulse:  [] 95  Resp:  [20-55] 20  SpO2:  [91 %-97 %] 94 %  BP: (123-147)/(58-89) 123/58     Weight: 71.7 kg (158 lb)  Body mass index is 27.12 kg/m².     Physical Exam  Vitals and nursing note reviewed.   Constitutional:       General: She is not in acute  distress.     Appearance: She is well-developed. She is not ill-appearing or diaphoretic.      Comments: Favors lying to the right   HENT:      Head: Normocephalic and atraumatic.      Mouth/Throat:      Mouth: Mucous membranes are moist.   Eyes:      General: No scleral icterus.     Conjunctiva/sclera: Conjunctivae normal.   Neck:      Vascular: No JVD.   Cardiovascular:      Rate and Rhythm: Normal rate. Rhythm irregular.   Pulmonary:      Effort: Pulmonary effort is normal. No respiratory distress.      Breath sounds: No wheezing.      Comments: Decreased breath sounds throughout the right lung, no significant increased work of breathing, no wheezing  Abdominal:      General: There is no distension.      Tenderness: There is no abdominal tenderness.   Musculoskeletal:      Right lower leg: No edema.      Left lower leg: No edema.   Skin:     Coloration: Skin is not jaundiced or pale.   Neurological:      Mental Status: She is alert.      Motor: Weakness (Left hemiparesis) present. No abnormal muscle tone.      Comments: Oriented only to person, somewhat follows commands   Psychiatric:         Cognition and Memory: Cognition is impaired. Memory is impaired.      Comments: Inattentive                Significant Labs: All pertinent labs within the past 24 hours have been reviewed.  CBC:   Recent Labs   Lab 01/29/24  1836   WBC 13.71*   HGB 9.4*   HCT 31.1*   *     CMP:   Recent Labs   Lab 01/29/24  1836      K 4.9   *   CO2 14*   GLU 55*   BUN 23   CREATININE 0.9   CALCIUM 8.7   PROT 6.1   ALBUMIN 2.9*   BILITOT 1.6*   ALKPHOS 57   AST 18   ALT 12   ANIONGAP 14       Significant Imaging: I have reviewed all pertinent imaging results/findings within the past 24 hours.

## 2024-01-30 NOTE — ASSESSMENT & PLAN NOTE
Meets sepsis criteria likely secondary to pneumonia.  Initial lactic acidosis improving with IV fluids.  Blood cultures collected; we will continue vancomycin and cefepime for now given concern for focal pneumonia, and we will follow.  Tailor antibiotics as appropriate.  Obtain CT chest as below.    This patient does have evidence of infective focus  My overall impression is sepsis.  Source: Respiratory  Antibiotics given-   Antibiotics (72h ago, onward)    Start     Stop Route Frequency Ordered    01/30/24 2200  vancomycin 1,250 mg in dextrose 5 % (D5W) 250 mL IVPB (Vial-Mate)         -- IV Every 24 hours (non-standard times) 01/29/24 2245    01/30/24 0600  ceFEPIme (MAXIPIME) 2 g in dextrose 5 % in water (D5W) 100 mL IVPB (MB+)         -- IV Every 12 hours (non-standard times) 01/29/24 2235    01/29/24 2335  vancomycin - pharmacy to dose  (vancomycin IVPB (PEDS and ADULTS))        See Hyperspace for full Linked Orders Report.    -- IV pharmacy to manage frequency 01/29/24 2235        Latest lactate reviewed-  Recent Labs   Lab 01/29/24 2157   POCLAC 1.54       Organ dysfunction indicated by Acute respiratory failure    Fluid challenge Not needed - patient is not hypotensive      Post- resuscitation assessment No - Post resuscitation assessment not needed       Will Not start Pressors- Levophed for MAP of 65

## 2024-01-30 NOTE — ASSESSMENT & PLAN NOTE
Currently without evidence of acute hypercapnic respiratory failure, and no wheezing on exam.  We will continue home inhalers.  1/30  sats 93% on 2LNC.

## 2024-01-30 NOTE — ASSESSMENT & PLAN NOTE
No evidence of acute exacerbation.  We will monitor volume status.  Patient admitted with signs and symptoms of CHF exacerbation. Continue with IV lasix.    Results for orders placed or performed during the hospital encounter of 01/29/24   Brain natriuretic peptide   Result Value Ref Range     (H) 0 - 99 pg/mL    serial troponins.  Cardiac Enzymes trend  Recent Labs   Lab 01/29/24  1836   TROPONINI 0.018     Results for orders placed during the hospital encounter of 10/09/23    Echo    Interpretation Summary    Left Ventricle: The left ventricle is normal in size. Ventricular mass is normal. Normal wall thickness. There is concentric remodeling. Normal wall motion. There is normal systolic function. Ejection fraction by visual approximation is 65%. Grade II diastolic dysfunction.    Left Atrium: Left atrium is severely dilated.    Right Ventricle: Normal right ventricular cavity size. Wall thickness is normal. Right ventricle wall motion  is normal. Systolic function is normal.    Aortic Valve: The aortic valve is a trileaflet valve. There is moderate aortic valve sclerosis. There is mild annular calcification present. There is restricted motion of the left coronary cusp.    Mitral Valve: There is bileaflet sclerosis. Moderately calcified subvalvular apparatus. Mildly restricted motion. There is mild to moderate regurgitation.    Tricuspid Valve: There is mild regurgitation.    Pulmonary Artery: The estimated pulmonary artery systolic pressure is 42 mmHg.    IVC/SVC: Normal venous pressure at 3 mmHg.

## 2024-01-30 NOTE — ED NOTES
Assumed care for pt after recieving report from MARILYN Dan. Pt resting in bed in NAD, RR e/u. Vital signs stable and within desired limits at this time of assessment. Pt on purewick for urinary needs. Explanation of care/wait provided.  Bed in low, locked position with rails up. Pt's daughter at bedside and updated on plan of care.     Patient identifiers for Selma Lux 88 y.o. female checked and correct.  Chief Complaint   Patient presents with    Hypoxia     Pt coming from [facility] for RA sat 85%, arrives on 97% 3L O2 NC, on abx for pneumonia; +tachypneic, a fib rvr 140s, significant cardiac hx

## 2024-01-30 NOTE — ED NOTES
Patient to flex 02--4L NC applied to wall and purewick attached to suction. All belongings retained per patient daughter. VSS and NAD at time of transfer to new room. Lynne RN at bedside at time of arrival to care area.

## 2024-01-30 NOTE — HOSPITAL COURSE
1/30 UA + . UC pending.  increased tachypnea and hypoxia.  Recently diagnosed with pneumonia last week and started on oral antibiotics along with oxygen.  sats 93% on 2LNC. pneumonia vs  chronic interstitial lung disease from rheumatoid arthritis.   Large pleural effusion noted on chest x-ray, CT chest -  Moderate volume right-sided pleural effusion with adjacent compressive atelectasis.  Patchy ground-glass opacities in the aerated lungs suggestive of underlying infectious/inflammatory process. Numerous new nodular opacities scattered within the aerated portions of the lungs, with leading differential diagnostic considerations of infectious/inflammatory process versus malignancy. .  Enlarged multinodular thyroid gland with complex hypoattenuating substernal mass thought to be of thyroid origin.  This mass was present previously but now causes more mass effect, with new narrowing of the right mainstem bronchus.  Malignancy is not excluded. Pulmonology consulted, continue cefepime and vancomycin. Low suspicion for PE as on eliquis. SLP consulted  -recs regular diet. Hold home CellCept .  IR consulted for diagnostic thoracentesis . started on heparin drip .  Goiter continues to enlarge and may one day compromise her airway. palliative care consulted for discussion with daughters.  1/31 Eliquis on hold. Heparin drip not started overnight due to concerns for bloody stains on gown. Per staff no witnessed hemoptysis. Hb at 7.6-->8.3 . PTT elevated at baseline 37. started on heparin drip. sats 96% on 3LNC. CBC q 6h.    sputum cultures. pulmonology f/u . Endocrinology consulted for multinodular goiter.  Discussed with the daughter at the bedside. UC - GNR. Identification and susceptibility pending.  SLP to follow up

## 2024-01-30 NOTE — H&P
[facility] - Emergency Dept  Hospital Medicine  History & Physical    Patient Name: Selma Lux  MRN: [id number]  Patient Class: IP- Inpatient  Admission Date: 1/29/2024  Attending Physician: Keenan Sosa MD   Primary Care Provider: Génesis Rosario MD         Patient information was obtained from relative(s) and ER records.     Subjective:     Principal Problem:Sepsis due to pneumonia    Chief Complaint:   Chief Complaint   Patient presents with    Hypoxia     Pt coming from [facility] for RA sat 85%, arrives on 97% 3L O2 NC, on abx for pneumonia; +tachypneic, a fib rvr 140s, significant cardiac hx        HPI: Selma Lux is a 88 y.o. female with RA with ILD on Cellcept and prednisone,  history of CVA with resultant vascular dementia, L hemiparesis, and debility/bedbound status, AFib, HTN, HFpEF who presents today with hypoxia.     History is provided by her daughter at bedside as she is unable to provide due to her dementia.     She states that at baseline, her mother has advancing dementia with waxing and waning mental status, which is unfortunately recently progressing. She is bedbound and favors lying to her R side given her L hemiparesis. Last week, she developed congestion and mucous production, so her PCP obtained a CXR which showed PNA. She has been on antibiotics since then, and was placed on oxygen as well.  Today, she was taken to a Rheumatology appointment, and her daughter noted that she appeared more fatigued than normal. After she was brought back to her group home home, she reportedly was more tachypnic and hypoxic, therefore she was brought here for evaluation.     She was initially tachycardic, tachypneic, and had large pleural effusion/pneumonia in the right on chest x-ray along with lactic acidosis and leukocytosis.  She was given vancomycin and Zosyn, and hospital medicine consulted for admission.    Per daughter at bedside, she is currently around her baseline  mental status, however this waxes and wanes.  Her p.o. intake has been poor recently.    Past Medical History:   Diagnosis Date    Acute cystitis without hematuria 2/27/2020    Acute hypoxemic respiratory failure 4/11/2018    Acute respiratory failure with hypoxia 3/2/2020    KERMIT (acute kidney injury) 3/13/2019    Altered mental status     Anemia     Arthritis     Bilateral hand pain 3/14/2018    Branch retinal vein occlusion, left eye 2/20/2015    Chronic bilateral low back pain without sciatica 3/23/2017    Chronic renal failure in pediatric patient, stage 3 (moderate) 4/15/2018    Chronic renal failure syndrome, stage 3 (moderate) 4/15/2018    Chronic systolic (congestive) heart failure 8/6/2021    Last Assessment & Plan:  Formatting of this note might be different from the original. 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 Hospitalization late 2021, w/ rehab to follow (no notes available) --12/13/2021-CT head revealed thin extra-axial hyperdense collection overlying the right cerebral convexity compatible with acute subdural hematoma, neurosurgery was consulted, patient was noted with Keppra 500 mg b.i.d., apixaban was held -12/19/    Iron deficiency anemia  secondary to inadequate dietary iron intake 6/24/2013    Joint pain     Keratoconjunctivitis sicca of both eyes not specified as Sjogren's 7/29/2016    Leiomyoma of uterus, unspecified 9/16/2014    Long QT interval 6/29/2016    Due to medication (plaquenil)     Macular edema 1/12/2015    Multinodular goiter 1/12/2016    Neuropathy 8/23/2017    Plaquenil causing adverse effect in therapeutic use 10/7/2016    Pneumococcal vaccine refused 8/17/2016    Pneumonia due to Streptococcus pneumoniae 4/5/2018    Poor nutrition 12/23/2018    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/2015    RA (rheumatoid arthritis) 12/13/2021    -managed by rhematology, since this is a duplicate problem list entry, will archive this     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       Past Surgical History:   Procedure Laterality Date    BREAST CYST EXCISION      CATARACT EXTRACTION      COLONOSCOPY N/A 9/29/2015    Procedure: COLONOSCOPY;  Surgeon: FIDELINA Sanchez MD;  [facility]  Service: Endoscopy;  Laterality: N/A;    EYE SURGERY      INJECTION OF ANESTHETIC AGENT AROUND NERVE Left 4/19/2021    Procedure: BLOCK, NERVE, FEMORAL AND OBTURATOR;  Surgeon: Shivam Gonzalez MD;  Location: [facility];  Service: Pain Management;  Laterality: Left;  consent needed    JOINT REPLACEMENT      right knee    KNEE SURGERY Left 12/31/2013    TKR    left parotidectomy      mixed tumor    UT EVAL,SWALLOW FUNCTION,CINE/VIDEO RECORD  6/5/2021         SALIVARY GLAND SURGERY      TONSILLECTOMY      UPPER GASTROINTESTINAL ENDOSCOPY         Review of patient's allergies indicates:  No  Known Allergies    No current facility-administered medications on file prior to encounter.     Current Outpatient Medications on File Prior to Encounter   Medication Sig    albuterol-ipratropium (DUO-NEB) 2.5 mg-0.5 mg/3 mL nebulizer solution Take 3 mLs by nebulization 2 (two) times daily as needed for Shortness of Breath. Rescue    amoxicillin-clavulanate 875-125mg (AUGMENTIN) 875-125 mg per tablet Take 1 tablet by mouth 2 (two) times daily.    apixaban (ELIQUIS) 5 mg Tab Take 1 tablet (5 mg total) by mouth 2 (two) times daily.    atorvastatin (LIPITOR) 40 MG tablet Take 1 tablet (40 mg total) by mouth every evening.    baclofen (LIORESAL) 5 mg Tab tablet Take 1 tablet (5 mg total) by mouth 3 (three) times daily.    cholecalciferol, vitamin D3, 125 mcg (5,000 unit) capsule Take 1 capsule (5,000 Units total) by mouth once daily.    famotidine (PEPCID) 20 MG tablet Take 20 mg by mouth.    ferrous sulfate, dried (SLOW FE) 160 mg (50 mg iron) TbSR Take 1 tablet (160 mg total) by mouth every other day.    fluticasone-salmeterol diskus inhaler 100-50 mcg Inhale 1 puff into the lungs 2 (two) times daily. Controller    gabapentin (NEURONTIN) 100 MG capsule Take 1 capsule (100 mg total) by mouth 3 (three) times daily.    gabapentin 5% baclofen 2% amitriptyline 2% topical cream Apply topically 3 (three) times daily. Apply to shoulders and neck for torticollis    hydroxychloroquine (PLAQUENIL) 200 mg tablet Take 1 tablet (200 mg total) by mouth once daily.    LIDOcaine (LIDODERM) 5 % Place 1 patch onto the skin once daily. Remove & Discard patch within 12 hours as needed for pain or as directed by MD Place patch onto lower back as needed    magnesium oxide (MAG-OX) 400 mg (241.3 mg magnesium) tablet Take 400 mg by mouth once daily.    mycophenolate (CELLCEPT) 500 mg Tab Take 1 tablet (500 mg total) by mouth 2 (two) times daily.    pantoprazole (PROTONIX) 40 MG tablet Take 1 tablet (40 mg total) by mouth once daily.     predniSONE (DELTASONE) 10 MG tablet Take 10 mg by mouth once daily.    QUEtiapine (SEROQUEL) 25 MG Tab Take 1 tablet (25 mg total) by mouth every evening. Patient may request 1/2 tablet rather than full 25mg nightly.    sulfamethoxazole-trimethoprim 800-160mg (BACTRIM DS) 800-160 mg Tab One tablet by mouth every Monday, Wednesday, Friday to prevent lung infection    thiamine 100 MG tablet Take 100 mg by mouth once daily.    [DISCONTINUED] calcium carbonate (TUMS) 200 mg calcium (500 mg) chewable tablet Take 2 tablets (1,000 mg total) by mouth once daily. (Patient not taking: No sig reported)    [DISCONTINUED] hydrOXYchloroQUINE (PLAQUENIL) 200 mg tablet Take 1 tablet (200 mg total) by mouth 2 (two) times daily.    [DISCONTINUED] lacosamide (VIMPAT) 10 mg/mL Soln oral solution Take 10 mLs (100 mg total) by mouth every 12 (twelve) hours. (Patient not taking: No sig reported)     Family History       Problem Relation (Age of Onset)    Breast cancer Maternal Grandmother    Cancer Father    Heart disease Mother    Hypertension Mother    Hyperthyroidism Mother, Other    Prostate cancer Father          Tobacco Use    Smoking status: Never     Passive exposure: Never    Smokeless tobacco: Never   Substance and Sexual Activity    Alcohol use: Yes     Alcohol/week: 0.0 standard drinks of alcohol     Comment: very seldom     Drug use: No    Sexual activity: Not Currently     Comment: ,  age 52,      Review of Systems   Unable to perform ROS: Dementia     Objective:     Vital Signs (Most Recent):  Temp: 98.8 °F (37.1 °C) (01/29/24 1807)  Pulse: 95 (01/29/24 2235)  Resp: 20 (01/29/24 2114)  BP: (!) 123/58 (01/29/24 2232)  SpO2: (!) 94 % (01/29/24 2232) Vital Signs (24h Range):  Temp:  [98.8 °F (37.1 °C)] 98.8 °F (37.1 °C)  Pulse:  [] 95  Resp:  [20-55] 20  SpO2:  [91 %-97 %] 94 %  BP: (123-147)/(58-89) 123/58     Weight: 71.7 kg (158 lb)  Body mass index is 27.12 kg/m².     Physical Exam  Vitals and nursing note  reviewed.   Constitutional:       General: She is not in acute distress.     Appearance: She is well-developed. She is not ill-appearing or diaphoretic.      Comments: Favors lying to the right   HENT:      Head: Normocephalic and atraumatic.      Mouth/Throat:      Mouth: Mucous membranes are moist.   Eyes:      General: No scleral icterus.     Conjunctiva/sclera: Conjunctivae normal.   Neck:      Vascular: No JVD.   Cardiovascular:      Rate and Rhythm: Normal rate. Rhythm irregular.   Pulmonary:      Effort: Pulmonary effort is normal. No respiratory distress.      Breath sounds: No wheezing.      Comments: Decreased breath sounds throughout the right lung, no significant increased work of breathing, no wheezing  Abdominal:      General: There is no distension.      Tenderness: There is no abdominal tenderness.   Musculoskeletal:      Right lower leg: No edema.      Left lower leg: No edema.   Skin:     Coloration: Skin is not jaundiced or pale.   Neurological:      Mental Status: She is alert.      Motor: Weakness (Left hemiparesis) present. No abnormal muscle tone.      Comments: Oriented only to person, somewhat follows commands   Psychiatric:         Cognition and Memory: Cognition is impaired. Memory is impaired.      Comments: Inattentive                Significant Labs: All pertinent labs within the past 24 hours have been reviewed.  CBC:   Recent Labs   Lab 01/29/24  1836   WBC 13.71*   HGB 9.4*   HCT 31.1*   *     CMP:   Recent Labs   Lab 01/29/24  1836      K 4.9   *   CO2 14*   GLU 55*   BUN 23   CREATININE 0.9   CALCIUM 8.7   PROT 6.1   ALBUMIN 2.9*   BILITOT 1.6*   ALKPHOS 57   AST 18   ALT 12   ANIONGAP 14       Significant Imaging: I have reviewed all pertinent imaging results/findings within the past 24 hours.  Assessment/Plan:     * Sepsis due to pneumonia  Meets sepsis criteria likely secondary to pneumonia.  Initial lactic acidosis improving with IV fluids.  Blood cultures  collected; we will continue vancomycin and cefepime for now given concern for focal pneumonia, and we will follow.  Tailor antibiotics as appropriate.  Obtain CT chest as below.    This patient does have evidence of infective focus  My overall impression is sepsis.  Source: Respiratory  Antibiotics given-   Antibiotics (72h ago, onward)      Start     Stop Route Frequency Ordered    01/30/24 2200  vancomycin 1,250 mg in dextrose 5 % (D5W) 250 mL IVPB (Vial-Mate)         -- IV Every 24 hours (non-standard times) 01/29/24 2245    01/30/24 0600  ceFEPIme (MAXIPIME) 2 g in dextrose 5 % in water (D5W) 100 mL IVPB (MB+)         -- IV Every 12 hours (non-standard times) 01/29/24 2235 01/29/24 2335  vancomycin - pharmacy to dose  (vancomycin IVPB (PEDS and ADULTS))        See Hyperspace for full Linked Orders Report.    -- IV pharmacy to manage frequency 01/29/24 2235 01/29/24 1845  vancomycin 1.75 g in 5 % dextrose 500 mL IVPB         01/30/24 0644 IV ED 1 Time 01/29/24 1843          Latest lactate reviewed-  Recent Labs   Lab 01/29/24  2157   POCLAC 1.54     Organ dysfunction indicated by Acute respiratory failure    Fluid challenge Not needed - patient is not hypotensive      Post- resuscitation assessment No - Post resuscitation assessment not needed       Will Not start Pressors- Levophed for MAP of 65    Pleural effusion  Large pleural effusion noted on chest x-ray, which may be secondary to pneumonia.  No overt evidence of volume overload to suggest heart failure.  We will attempt to obtain CT chest for better characterization; may require thoracentesis.  Continue antibiotics.      COPD without exacerbation  Currently without evidence of acute hypercapnic respiratory failure, and no wheezing on exam.  We will continue home inhalers.    Heart failure with preserved ejection fraction  No evidence of acute exacerbation.  We will monitor volume status.      Vascular Dementia  Per daughter at bedside, recently with  worsening dementia with waxing and waning mental status.  We will continue home Seroquel nightly along with delirium precautions.    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  Continue supportive care and frequent turns.      AF (paroxysmal atrial fibrillation)  Currently rate controlled.  Continue home Eliquis.    Oropharyngeal dysphagia  Noted in the past, however not on specific dysphagia diet per daughter.  Her right sided pneumonia may be secondary to aspiration as she is at high risk for this.  May consider speech therapy consultation.      Acute respiratory failure with hypoxia  Presents from her [facility] with increased tachypnea and hypoxia.  Recently diagnosed with pneumonia last week and started on oral antibiotics along with oxygen.  PO2 is decreased on ABG, however appears to be chronically decreased.  Suspect pneumonia on chronic interstitial lung disease from rheumatoid arthritis.  We will continue oxygen to maintain sats 88% or greater.  Low suspicion for PE given chronic Eliquis use.    Rheumatoid arthritis of multiple sites  Continue home Plaquenil and prednisone.  Hold home CellCept given acute infection.        VTE Risk Mitigation (From admission, onward)           Ordered     apixaban tablet 5 mg  2 times daily         01/29/24 8439                   Advance Care Planning   Patient is Full Code on discussion with her daughter.  Patient's surrogate decision maker is her daughter (medical POA).               Pharmacokinetic Initial Assessment: IV Vancomycin    Assessment/Plan:    Initiate intravenous vancomycin with loading dose of 1750 mg once, done in ED, followed by a maintenance dose of vancomycin 1250 mg IV every 24 hours.  Desired empiric serum trough concentration is 15 to 20 mcg/mL  Draw vancomycin trough level 60 min prior to third dose on 01/31/2024 at 2100.  Pharmacy will continue to follow and  "monitor vancomycin.      Please contact pharmacy at extension [phone] with any questions regarding this assessment.     Thank you for the consult,   Concepcion Bull       Patient brief summary:  Selma Lux is a 88 y.o. female initiated on antimicrobial therapy with IV Vancomycin for treatment of suspected lower respiratory infection.    Drug Allergies:   Review of patient's allergies indicates:  No Known Allergies    Actual Body Weight:   71.7 kg    Renal Function:   Estimated Creatinine Clearance: 43.6 mL/min (based on SCr of 0.9 mg/dL).    CBC (last 72 hours):  Recent Labs   Lab Result Units 01/29/24  1836   WBC K/uL 13.71*   Hemoglobin g/dL 9.4*   Hematocrit % 31.1*   Platelets K/uL 136*   Gran % % 56.2   Lymph % % 10.0*   Mono % % 31.0*   Eosinophil % % 1.3   Basophil % % 0.3   Differential Method  Automated       Metabolic Panel (last 72 hours):  Recent Labs   Lab Result Units 01/29/24  1836   Sodium mmol/L 140   Potassium mmol/L 4.9   Chloride mmol/L 112*   CO2 mmol/L 14*   Glucose mg/dL 55*   BUN mg/dL 23   Creatinine mg/dL 0.9   Albumin g/dL 2.9*   Total Bilirubin mg/dL 1.6*   Alkaline Phosphatase U/L 57   AST U/L 18   ALT U/L 12       Drug levels (last 3 results):  No results for input(s): "VANCOMYCINRA", "VANCORANDOM", "VANCOMYCINPE", "VANCOPEAK", "VANCOMYCINTR", "VANCOTROUGH" in the last 72 hours.    Microbiologic Results:  Microbiology Results (last 7 days)       Procedure Component Value Units Date/Time    Culture, MRSA [[id number]]     Order Status: No result Specimen: MRSA source from Nares, Right     Influenza A & B by Molecular [[id number]] Collected: 01/29/24 1838    Order Status: Completed Specimen: Nasopharyngeal Swab Updated: 01/29/24 1931     Influenza A, Molecular Negative     Influenza B, Molecular Negative     Flu A & B Source NP    Blood culture x two cultures. Draw prior to antibiotics. [[id number]] Collected: 01/29/24 1843    Order Status: Sent Specimen: Blood from Peripheral, " Forearm, Right Updated: 01/29/24 1851    Blood culture x two cultures. Draw prior to antibiotics. [[id number]] Collected: 01/29/24 1835    Order Status: Sent Specimen: Blood from Peripheral, Hand, Right Updated: 01/29/24 1844              Osvaldo Ponce MD  Department of Hospital Medicine  [facility] - Emergency Dept

## 2024-01-30 NOTE — ASSESSMENT & PLAN NOTE
1/30  CT chest - .  Enlarged multinodular thyroid gland with complex hypoattenuating substernal mass thought to be of thyroid origin.  This mass was present previously but now causes more mass effect, with new narrowing of the right mainstem bronchus.  Malignancy is not excluded. Pulmonology consulted, TSH WNL

## 2024-01-30 NOTE — ASSESSMENT & PLAN NOTE
Large pleural effusion noted on chest x-ray, which may be secondary to pneumonia.  No overt evidence of volume overload to suggest heart failure.  We will attempt to obtain CT chest for better characterization; may require thoracentesis.  Continue antibiotics.

## 2024-01-30 NOTE — ED NOTES
Nurses Note -- 4 Eyes      1/30/2024   3:35 AM      Skin assessed during: Admit      [] No Altered Skin Integrity Present    []Prevention Measures Documented      [x] Yes- Altered Skin Integrity Present or Discovered   [x] LDA Added if Not in Epic (Describe Wound)   [x] New Altered Skin Integrity was Present on Admit and Documented in LDA   [] Wound Image Taken    Wound Care Consulted? No    Attending Nurse:  Sisi GARCIA RN    Second RN/Staff Member:   Amber ZHOU RN

## 2024-01-30 NOTE — ED NOTES
Pt provided sandwich tray. Able to eat some of sandwich. Family states chewing was difficult for pt.

## 2024-01-30 NOTE — ASSESSMENT & PLAN NOTE
Noted in the past, however not on specific dysphagia diet per daughter.  Her right sided pneumonia may be secondary to aspiration as she is at high risk for this.  May consider speech therapy consultation.

## 2024-01-30 NOTE — CLINICAL REVIEW
IP Sepsis Screen (most recent)       Sepsis Screen (IP) - 01/30/24 0810       Is the patient's history or complaint suggestive of a possible infection? Yes  -    Are there at least two of the following signs and symptoms present? Yes  -    Sepsis signs/symptoms - Tachycardia Tachycardia     >90  -    Sepsis signs/symptoms - WBC WBC < 4,000 or WBC > 12,000  -    Are any of the following organ dysfunction criteria present and not considered to be due to a chronic condition? Yes  -    Organ Dysfunction Criteria - O2 O2 Saturation < 95% on room air  -    Initiate Sepsis Protocol No  -    Reason sepsis not considered Pt. receiving appropriate management  -              User Key  (r) = Recorded By, (t) = Taken By, (c) = Cosigned By      Initials Name    Triny Blanchard RN

## 2024-01-30 NOTE — ASSESSMENT & PLAN NOTE
Currently without evidence of acute hypercapnic respiratory failure, and no wheezing on exam.  We will continue home inhalers.

## 2024-01-30 NOTE — PLAN OF CARE
Problem: SLP  Goal: SLP Goal  Description: Speech Language Pathology Goals  Goals expected to be met by 2/13  1. Pt will participate in ongoing assessment of swallow.     Outcome: Ongoing, Progressing     Bedside swallow assessment completed.  Pt with increased aspiration risk with altered mentation, tolerating thin by straw.  Daughter encouraged to feed only when alert and accepting, providing purees/soft solids as pt alert and accepting. Please monitor for overt s/s aspiration.

## 2024-01-30 NOTE — ED TRIAGE NOTES
Selma Lux, a 88 y.o. female presents to the ED w/ complaint of hypoxia. 97% 2L, on abx on pneumonia. +cardiac hx        Triage note:  Chief Complaint   Patient presents with    Hypoxia     Pt coming from [facility] for RA sat 85%, arrives on 97% 3L O2 NC, on abx for pneumonia; +tachypneic, a fib rvr 140s, significant cardiac hx     Review of patient's allergies indicates:  No Known Allergies  Past Medical History:   Diagnosis Date    Acute cystitis without hematuria 2/27/2020    Acute hypoxemic respiratory failure 4/11/2018    Acute respiratory failure with hypoxia 3/2/2020    KERMIT (acute kidney injury) 3/13/2019    Altered mental status     Anemia     Arthritis     Bilateral hand pain 3/14/2018    Branch retinal vein occlusion, left eye 2/20/2015    Chronic bilateral low back pain without sciatica 3/23/2017    Chronic renal failure in pediatric patient, stage 3 (moderate) 4/15/2018    Chronic renal failure syndrome, stage 3 (moderate) 4/15/2018    Chronic systolic (congestive) heart failure 8/6/2021    Last Assessment & Plan:  Formatting of this note might be different from the original. 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 Hospitalization late  2021, w/ rehab to follow (no notes available) --12/13/2021-CT head revealed thin extra-axial hyperdense collection overlying the right cerebral convexity compatible with acute subdural hematoma, neurosurgery was consulted, patient was noted with Keppra 500 mg b.i.d., apixaban was held -12/19/    Iron deficiency anemia secondary to inadequate dietary iron intake 6/24/2013    Joint pain     Keratoconjunctivitis sicca of both eyes not specified as Sjogren's 7/29/2016    Leiomyoma of uterus, unspecified 9/16/2014    Long QT interval 6/29/2016    Due to medication (plaquenil)     Macular edema 1/12/2015    Multinodular goiter 1/12/2016    Neuropathy 8/23/2017    Plaquenil causing adverse effect in therapeutic use 10/7/2016    Pneumococcal vaccine refused 8/17/2016    Pneumonia due to Streptococcus pneumoniae 4/5/2018    Poor nutrition 12/23/2018    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/2015    RA (rheumatoid arthritis) 12/13/2021    -managed by rhematology, since this is a duplicate problem list entry, will archive this     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

## 2024-01-30 NOTE — AI DETERIORATION ALERT
RAPID RESPONSE NURSE AI ALERT       AI alert received.    Chart Reviewed: 01/29/2024, 11:42 PM    MRN: [id number]  Bed: ED 01/01    Dx: Sepsis due to pneumonia    Selma Lux has a past medical history of Acute cystitis without hematuria, Acute hypoxemic respiratory failure, Acute respiratory failure with hypoxia, KERMIT (acute kidney injury), Altered mental status, Anemia, Arthritis, Bilateral hand pain, Branch retinal vein occlusion, left eye, Chronic bilateral low back pain without sciatica, Chronic renal failure in pediatric patient, stage 3 (moderate), Chronic renal failure syndrome, stage 3 (moderate), Chronic systolic (congestive) heart failure, Cognitive communication deficit, COPD exacerbation, Cystoid macular edema, left eye, Cystoid macular edema, left eye, Delirium, DJD (degenerative joint disease) of cervical spine, Enterococcal bacteremia, Fatty liver, Goiter, Hashimoto's disease, Hemichorea, History of 2019 novel coronavirus disease (COVID-19), Hypertension, Hypertensive encephalopathy, IBS (irritable bowel syndrome), IGT (impaired glucose tolerance), Intracranial subdural hematoma, Iron deficiency anemia secondary to inadequate dietary iron intake, Joint pain, Keratoconjunctivitis sicca of both eyes not specified as Sjogren's, Leiomyoma of uterus, unspecified, Long QT interval, Macular edema, Multinodular goiter, Neuropathy, Plaquenil causing adverse effect in therapeutic use, Pneumococcal vaccine refused, Pneumonia due to Streptococcus pneumoniae, Poor nutrition, Primary osteoarthritis involving multiple joints, RA (rheumatoid arthritis), Retinal macroaneurysm of left eye, s/p Right Total knee replacement, Scoliosis of thoracic spine, Small vessel disease, cerebrovascular, Stroke, Traumatic subdural hemorrhage with loss of consciousness of unspecified duration, initial encounter, UTI (urinary tract infection), and Vascular dementia.    Last VS: BP (!) 123/58   Pulse 95   Temp 98.8 °F (37.1 °C)  (Oral)   Resp 20   Wt 71.7 kg (158 lb)   LMP  (LMP Unknown)   SpO2 (!) 94%   BMI 27.12 kg/m²     24H Vital Sign Range:  Temp:  [98.8 °F (37.1 °C)]   Pulse:  []   Resp:  [20-55]   BP: (123-147)/(58-89)   SpO2:  [91 %-97 %]     Level of Consciousness (AVPU): alert    Recent Labs     01/29/24  1836   WBC 13.71*   HGB 9.4*   HCT 31.1*   *       Recent Labs     01/29/24  1836      K 4.9   *   CO2 14*   BUN 23   CREATININE 0.9   GLU 55*        Recent Labs     01/29/24  1905   PH 7.423   PCO2 25.9*   PO2 51*   HCO3 17.0*   POCSATURATED 88   BE -7*        OXYGEN:  Flow (L/min): 2          MEWS score:      Bedside Ben SANDERS  contacted. No concerns verbalized at this time. Instructed to call [phone] for further concerns or assistance.    Kristal Yu RN

## 2024-01-30 NOTE — AI DETERIORATION ALERT
RAPID RESPONSE NURSE AI ALERT       AI alert received.    Chart Reviewed: 01/30/2024, 7:14 AM    MRN: [id number]  Bed: ED 01/01    Dx: Sepsis due to pneumonia    Selma Lux has a past medical history of Acute cystitis without hematuria, Acute hypoxemic respiratory failure, Acute respiratory failure with hypoxia, KERMIT (acute kidney injury), Altered mental status, Anemia, Arthritis, Bilateral hand pain, Branch retinal vein occlusion, left eye, Chronic bilateral low back pain without sciatica, Chronic renal failure in pediatric patient, stage 3 (moderate), Chronic renal failure syndrome, stage 3 (moderate), Chronic systolic (congestive) heart failure, Cognitive communication deficit, COPD exacerbation, Cystoid macular edema, left eye, Cystoid macular edema, left eye, Delirium, DJD (degenerative joint disease) of cervical spine, Enterococcal bacteremia, Fatty liver, Goiter, Hashimoto's disease, Hemichorea, History of 2019 novel coronavirus disease (COVID-19), Hypertension, Hypertensive encephalopathy, IBS (irritable bowel syndrome), IGT (impaired glucose tolerance), Intracranial subdural hematoma, Iron deficiency anemia secondary to inadequate dietary iron intake, Joint pain, Keratoconjunctivitis sicca of both eyes not specified as Sjogren's, Leiomyoma of uterus, unspecified, Long QT interval, Macular edema, Multinodular goiter, Neuropathy, Plaquenil causing adverse effect in therapeutic use, Pneumococcal vaccine refused, Pneumonia due to Streptococcus pneumoniae, Poor nutrition, Primary osteoarthritis involving multiple joints, RA (rheumatoid arthritis), Retinal macroaneurysm of left eye, s/p Right Total knee replacement, Scoliosis of thoracic spine, Small vessel disease, cerebrovascular, Stroke, Traumatic subdural hemorrhage with loss of consciousness of unspecified duration, initial encounter, UTI (urinary tract infection), and Vascular dementia.    Last VS: BP (!) 111/53   Pulse 101   Temp 98.8 °F (37.1 °C)  (Oral)   Resp 17   Wt 71.7 kg (158 lb)   LMP  (LMP Unknown)   SpO2 (!) 93%   BMI 27.12 kg/m²     24H Vital Sign Range:  Temp:  [98.8 °F (37.1 °C)]   Pulse:  []   Resp:  [14-55]   BP: (110-147)/(53-89)   SpO2:  [91 %-97 %]     Level of Consciousness (AVPU): alert    Recent Labs     01/29/24  1836   WBC 13.71*   HGB 9.4*   HCT 31.1*   *       Recent Labs     01/29/24  1836      K 4.9   *   CO2 14*   BUN 23   CREATININE 0.9   GLU 55*        Recent Labs     01/29/24  1905   PH 7.423   PCO2 25.9*   PO2 51*   HCO3 17.0*   POCSATURATED 88   BE -7*        OXYGEN:  Flow (L/min): 2          MEWS score: 2    Charge Laura SANDERS  contacted. No concerns verbalized at this time. Instructed to call [phone] for further concerns or assistance.    Shayna Wellington RN

## 2024-01-30 NOTE — MEDICAL/APP STUDENT
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 The patient presented to the [facility] ED via EMS on 1/29/2024 for being found fatigued, somnolent and hypoxic on pulse oximetry (85% on room air), arrives on 97% on 3L O2 on NC and afib RVR.     Patient was receiving amoxicillin/clavulanate for a pneumonia diagnosed by her PCP a week ago. Initial workup yesterday revealed leukocytosis (13.7), anemia (Hb 9.4), lactate of 4.59, CXR revealed large right sided pleural effusion, so she was started on IV broad spectrum antibiotics and IV fluids which improved lactic acid levels. Chest CT revealed moderate volume right-sided pleural effusion with adjacent compressive atelectasis and numerous new nodular opacities scattered within the aerated portions of the lungs, with leading differential diagnostic considerations of infectious/inflammatory process versus malignancy, so pulmonary was consulted.     As of today, patient is oriented in person, disoriented in time and place, basal left sided hemiparesis is noted, hemodynamically stable, requiring 4L of O2 on NC.     Past Medical History:  Past Medical History:   Diagnosis Date    Acute cystitis without hematuria 2/27/2020    Acute hypoxemic respiratory failure 4/11/2018    Acute respiratory failure with hypoxia 3/2/2020    KERMIT (acute kidney injury) 3/13/2019    Altered mental status     Anemia     Arthritis     Bilateral hand pain 3/14/2018    Branch retinal vein occlusion, left eye 2/20/2015    Chronic bilateral low back pain without sciatica 3/23/2017    Chronic renal  failure in pediatric patient, stage 3 (moderate) 4/15/2018    Chronic renal failure syndrome, stage 3 (moderate) 4/15/2018    Chronic systolic (congestive) heart failure 8/6/2021    Last Assessment & Plan:  Formatting of this note might be different from the original. 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 Hospitalization late 2021, w/ rehab to follow (no notes available) --12/13/2021-CT head revealed thin extra-axial hyperdense collection overlying the right cerebral convexity compatible with acute subdural hematoma, neurosurgery was consulted, patient was noted with Keppra 500 mg b.i.d., apixaban was held -12/19/    Iron deficiency anemia secondary to inadequate dietary iron intake 6/24/2013    Joint pain     Keratoconjunctivitis sicca of both eyes not specified as Sjogren's 7/29/2016    Leiomyoma of uterus, unspecified 9/16/2014    Long QT interval 6/29/2016    Due to medication (plaquenil)     Macular edema 1/12/2015    Multinodular goiter 1/12/2016    Neuropathy 8/23/2017    Plaquenil causing adverse effect in therapeutic use 10/7/2016    Pneumococcal vaccine refused 8/17/2016    Pneumonia due to Streptococcus pneumoniae 4/5/2018    Poor nutrition  12/23/2018    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/2015    RA (rheumatoid arthritis) 12/13/2021    -managed by rhematology, since this is a duplicate problem list entry, will archive this     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       Past Surgical History:  Past Surgical History:   Procedure Laterality Date    BREAST CYST EXCISION      CATARACT EXTRACTION      COLONOSCOPY N/A 9/29/2015    Procedure: COLONOSCOPY;  Surgeon: FIDELINA Sanchez MD;  [facility]  Service: Endoscopy;  Laterality: N/A;    EYE SURGERY      INJECTION OF ANESTHETIC AGENT AROUND NERVE Left 4/19/2021    Procedure: BLOCK, NERVE, FEMORAL AND OBTURATOR;  Surgeon: Shivam Gonzalez MD;  Location: [facility] PAIN MGT;  Service: Pain Management;  Laterality: Left;  consent needed    JOINT REPLACEMENT      right knee    KNEE SURGERY Left 12/31/2013    TKR    left parotidectomy      mixed tumor    AK EVAL,SWALLOW FUNCTION,CINE/VIDEO RECORD  6/5/2021         SALIVARY GLAND SURGERY      TONSILLECTOMY      UPPER GASTROINTESTINAL ENDOSCOPY         Allergies:  Review of patient's allergies indicates:  No Known Allergies    Medications:   In-Hospital Scheduled Medications:   apixaban  5 mg Oral BID    atorvastatin  40 mg Oral QHS    ceFEPime IV (PEDS and ADULTS)  2 g Intravenous Q12H    fluticasone furoate-vilanteroL  1 puff Inhalation Daily    gabapentin  100 mg Oral TID    hydroxychloroquine  200 mg Oral Daily    pantoprazole  40 mg Oral Daily    predniSONE  10 mg Oral Daily    thiamine  100 mg Oral Daily    vancomycin (VANCOCIN) IV (PEDS and ADULTS)  1,250 mg Intravenous Q24H       In-Hospital PRN Medications:  acetaminophen, acetaminophen, albuterol-ipratropium, aluminum-magnesium hydroxide-simethicone, melatonin, naloxone, ondansetron, polyethylene glycol, QUEtiapine, simethicone, sodium chloride 0.9%, Pharmacy to dose Vancomycin consult **AND** vancomycin - pharmacy to dose   In-Hospital IV Infusion Medications:     Home Medications:  Prior to Admission medications    Medication Sig Start Date End Date Taking? Authorizing Provider   albuterol-ipratropium (DUO-NEB) 2.5 mg-0.5 mg/3 mL nebulizer solution Take 3 mLs by nebulization 2 (two) times daily as needed for Shortness of Breath. Rescue 8/3/23   Megan Chandra NP   amoxicillin-clavulanate 875-125mg (AUGMENTIN) 875-125 mg per tablet Take 1 tablet by mouth 2 (two) times daily. 1/23/24   Provider, Historical   apixaban (ELIQUIS) 5 mg Tab Take 1 tablet (5 mg total) by mouth 2 (two) times daily. 10/16/23 10/15/24  Karen Mcginnis MD   atorvastatin (LIPITOR) 40 MG tablet Take 1 tablet (40 mg total) by mouth every evening. 5/25/23   Farida Garcia MD   baclofen (LIORESAL) 5 mg Tab tablet Take 1 tablet (5 mg total) by mouth 3 (three) times daily. 10/16/23   Karen Mcginnis MD   cholecalciferol, vitamin D3, 125 mcg (5,000 unit) capsule Take 1 capsule (5,000 Units total) by mouth once daily. 7/24/23   Génesis Rosario MD   famotidine (PEPCID) 20 MG tablet Take 20 mg by mouth. 12/13/23   Provider, Historical   ferrous sulfate, dried (SLOW FE) 160 mg (50 mg iron) TbSR Take 1 tablet (160 mg total) by mouth every other day. 5/13/23   Nanette Ojeda,    fluticasone-salmeterol diskus inhaler 100-50 mcg Inhale 1 puff into the lungs 2 (two) times daily. Controller 8/3/23   Megan Chandra NP   gabapentin (NEURONTIN) 100 MG capsule Take 1 capsule (100 mg total) by mouth 3 (three) times daily. 10/16/23 10/15/24  Karen Mcginnis MD   gabapentin 5% baclofen 2% amitriptyline 2% topical cream Apply topically 3 (three) times daily. Apply to shoulders  and neck for torticollis 10/16/23   Ramakrishna Gleason MD   hydroxychloroquine (PLAQUENIL) 200 mg tablet Take 1 tablet (200 mg total) by mouth once daily. 1/29/24 1/23/25  Lonnie Rouse MD   LIDOcaine (LIDODERM) 5 % Place 1 patch onto the skin once daily. Remove & Discard patch within 12 hours as needed for pain or as directed by MD Place patch onto lower back as needed 7/28/23   Génesis Rosario MD   magnesium oxide (MAG-OX) 400 mg (241.3 mg magnesium) tablet Take 400 mg by mouth once daily.    Provider, Historical   mycophenolate (CELLCEPT) 500 mg Tab Take 1 tablet (500 mg total) by mouth 2 (two) times daily. 1/30/23   Christopher Plaza NP   pantoprazole (PROTONIX) 40 MG tablet Take 1 tablet (40 mg total) by mouth once daily. 8/3/23 1/30/24  Megan Chandra NP   predniSONE (DELTASONE) 10 MG tablet Take 10 mg by mouth once daily.    Provider, Historical   QUEtiapine (SEROQUEL) 25 MG Tab Take 1 tablet (25 mg total) by mouth every evening. Patient may request 1/2 tablet rather than full 25mg nightly. 10/16/23 10/15/24  Ramakrishna Gleason MD   sulfamethoxazole-trimethoprim 800-160mg (BACTRIM DS) 800-160 mg Tab One tablet by mouth every Monday, Wednesday, Friday to prevent lung infection 4/6/23   Lonnie Rouse MD   thiamine 100 MG tablet Take 100 mg by mouth once daily.    Provider, Historical   calcium carbonate (TUMS) 200 mg calcium (500 mg) chewable tablet Take 2 tablets (1,000 mg total) by mouth once daily.  Patient not taking: No sig reported 1/3/22 6/23/22  Tim Castillo MD   lacosamide (VIMPAT) 10 mg/mL Soln oral solution Take 10 mLs (100 mg total) by mouth every 12 (twelve) hours.  Patient not taking: No sig reported 1/3/22 6/23/22  Tim Castillo MD       Family History:  Family History   Problem Relation Age of Onset    Hypertension Mother     Heart disease Mother     Hyperthyroidism Mother     Prostate cancer Father         prostate cancer    Cancer Father     Breast cancer  "Maternal Grandmother     Hyperthyroidism Other     Colon cancer Neg Hx        Social History:  Social History     Tobacco Use    Smoking status: Never     Passive exposure: Never    Smokeless tobacco: Never   Substance Use Topics    Alcohol use: Yes     Alcohol/week: 0.0 standard drinks of alcohol     Comment: very seldom     Drug use: No       Review of Systems:  Review of systems not obtained due to patient factors  .     Objective:   Last 24 Hour Vital Signs:  BP  Min: 107/53  Max: 147/70  Temp  Av.5 °F (36.9 °C)  Min: 98 °F (36.7 °C)  Max: 98.8 °F (37.1 °C)  Pulse  Av.6  Min: 87  Max: 140  Resp  Av.7  Min: 14  Max: 55  SpO2  Av.6 %  Min: 91 %  Max: 100 %  Height  Av' 4" (162.6 cm)  Min: 5' 4" (162.6 cm)  Max: 5' 4" (162.6 cm)  Weight  Av.7 kg (158 lb 0.4 oz)  Min: 71.7 kg (158 lb)  Max: 71.7 kg (158 lb 1.1 oz)  I/O last 3 completed shifts:  In: 500 [IV Piggyback:500]  Out: -     Physical Examination:  Physical Exam  HENT:      Head: Normocephalic and atraumatic.   Cardiovascular:      Rate and Rhythm: Rhythm irregular.      Pulses: Normal pulses.      Heart sounds: Normal heart sounds. No murmur heard.     No gallop.   Pulmonary:      Effort: Pulmonary effort is normal.      Breath sounds: Normal air entry. Examination of the right-lower field reveals decreased breath sounds. Examination of the left-lower field reveals rales. Decreased breath sounds and rales present.   Abdominal:      General: Abdomen is flat.      Palpations: Abdomen is soft.      Tenderness: There is no abdominal tenderness.   Musculoskeletal:      Cervical back: Normal range of motion.   Skin:     General: Skin is warm.      Capillary Refill: Capillary refill takes less than 2 seconds.   Neurological:      Mental Status: She is alert. She is disoriented.      Motor: Weakness (Left sided hemiparesis) present.      Comments: Oriented only to person, disoriented to time and place. She is on her baseline due to " dementia and history of CVA as per her daughter.          Laboratory:  Trended Lab Data:  Recent Labs     01/29/24 1836   WBC 13.71*   HGB 9.4*   HCT 31.1*   *      K 4.9   *   CO2 14*   BUN 23   CREATININE 0.9   GLU 55*   BILITOT 1.6*   AST 18   ALT 12   ALKPHOS 57   CALCIUM 8.7   ALBUMIN 2.9*   PROT 6.1       Cardiac:   Recent Labs   Lab 01/29/24 1836   TROPONINI 0.018   *       Urinalysis:   Lab Results   Component Value Date    LABURIN No significant growth 10/10/2023    COLORU Yellow 01/30/2024    SPECGRAV 1.020 01/30/2024    NITRITE Negative 01/30/2024    KETONESU Negative 01/30/2024    UROBILINOGEN Normal 07/21/2022       Microbiology:  Microbiology Results (last 7 days)       Procedure Component Value Units Date/Time    Urine culture [[id number]] Collected: 01/30/24 0334    Order Status: No result Specimen: Urine Updated: 01/30/24 0425    Blood culture x two cultures. Draw prior to antibiotics. [[id number]] Collected: 01/29/24 1843    Order Status: Completed Specimen: Blood from Peripheral, Forearm, Right Updated: 01/30/24 0145     Blood Culture, Routine No Growth to date    Narrative:      Aerobic and anaerobic    Blood culture x two cultures. Draw prior to antibiotics. [[id number]] Collected: 01/29/24 1835    Order Status: Completed Specimen: Blood from Peripheral, Hand, Right Updated: 01/30/24 0145     Blood Culture, Routine No Growth to date    Narrative:      Aerobic and anaerobic    Culture, MRSA [[id number]]     Order Status: No result Specimen: MRSA source from Nares, Right     Influenza A & B by Molecular [[id number]] Collected: 01/29/24 1838    Order Status: Completed Specimen: Nasopharyngeal Swab Updated: 01/29/24 1931     Influenza A, Molecular Negative     Influenza B, Molecular Negative     Flu A & B Source NP            Radiology:  I have personally reviewed the above labs and imaging.    Current Medications:     Infusions:       Scheduled:   apixaban  5 mg Oral BID     atorvastatin  40 mg Oral QHS    ceFEPime IV (PEDS and ADULTS)  2 g Intravenous Q12H    fluticasone furoate-vilanteroL  1 puff Inhalation Daily    gabapentin  100 mg Oral TID    hydroxychloroquine  200 mg Oral Daily    pantoprazole  40 mg Oral Daily    predniSONE  10 mg Oral Daily    thiamine  100 mg Oral Daily    vancomycin (VANCOCIN) IV (PEDS and ADULTS)  1,250 mg Intravenous Q24H        PRN:  acetaminophen, acetaminophen, albuterol-ipratropium, aluminum-magnesium hydroxide-simethicone, melatonin, naloxone, ondansetron, polyethylene glycol, QUEtiapine, simethicone, sodium chloride 0.9%, Pharmacy to dose Vancomycin consult **AND** vancomycin - pharmacy to dose     Assessment and Plan:   Sepsis due to pneumonia  Patient with CXR showing bilateral patchy alveolar infiltrates, elevated WBC, which is downtrending. Started on vancomycin and cefepime, appropiate coverage for now.   - Continue antibiotic therapy  - Titrate oxygen therapy for SpO2 over 90%  - Watch respiratory status closely    Pleural effusion and pulmonary nodules  -  Chest CT findings: Moderate volume right-sided pleural effusion with adjacent compressive atelectasis. Numerous new nodular opacities scattered within the aerated portions of the lungs, with leading differential diagnostic considerations of infectious/inflammatory process versus malignancy.    - Patient would benefit from diagnostic thoracentesis, although she is unable to sit right now. Recommend consulting IR for procedure.   - Further interventions will be decided upon results, will continue following.    - Patient currently receiving Eliquis for afib, need to be off for 48 hours prior to procedure, recommend bridging with heparin drip until procedure.     Thank you for allowing us to participate in the care of this patient. Please contact me if you have any questions regarding this consult.

## 2024-01-30 NOTE — ASSESSMENT & PLAN NOTE
Meets sepsis criteria likely secondary to pneumonia.  Initial lactic acidosis improving with IV fluids.  Blood cultures collected; we will continue vancomycin and cefepime for now given concern for focal pneumonia, and we will follow.  Tailor antibiotics as appropriate.  Obtain CT chest as below.    This patient does have evidence of infective focus  My overall impression is sepsis.  Source: Respiratory  Antibiotics given-   Antibiotics (72h ago, onward)      Start     Stop Route Frequency Ordered    01/30/24 2200  vancomycin 1,250 mg in dextrose 5 % (D5W) 250 mL IVPB (Vial-Mate)         -- IV Every 24 hours (non-standard times) 01/29/24 2245    01/30/24 0600  ceFEPIme (MAXIPIME) 2 g in dextrose 5 % in water (D5W) 100 mL IVPB (MB+)         -- IV Every 12 hours (non-standard times) 01/29/24 2235 01/29/24 2335  vancomycin - pharmacy to dose  (vancomycin IVPB (PEDS and ADULTS))        See Hyperspace for full Linked Orders Report.    -- IV pharmacy to manage frequency 01/29/24 2235 01/29/24 1845  vancomycin 1.75 g in 5 % dextrose 500 mL IVPB         01/30/24 0644 IV ED 1 Time 01/29/24 1843          Latest lactate reviewed-  Recent Labs   Lab 01/29/24  2157   POCLAC 1.54     Organ dysfunction indicated by Acute respiratory failure    Fluid challenge Not needed - patient is not hypotensive      Post- resuscitation assessment No - Post resuscitation assessment not needed       Will Not start Pressors- Levophed for MAP of 65

## 2024-01-30 NOTE — ASSESSMENT & PLAN NOTE
Per daughter at bedside, recently with worsening dementia with waxing and waning mental status.  We will continue home Seroquel nightly along with delirium precautions.

## 2024-01-30 NOTE — ED PROVIDER NOTES
Encounter Date: 1/29/2024       History     Chief Complaint   Patient presents with    Hypoxia     Pt coming from [facility] for RA sat 85%, arrives on 97% 3L O2 NC, on abx for pneumonia; +tachypneic, a fib rvr 140s, significant cardiac hx     HPI  Review of patient's allergies indicates:  No Known Allergies  Past Medical History:   Diagnosis Date    Acute cystitis without hematuria 2/27/2020    Acute hypoxemic respiratory failure 4/11/2018    Acute respiratory failure with hypoxia 3/2/2020    KERMIT (acute kidney injury) 3/13/2019    Altered mental status     Anemia     Arthritis     Bilateral hand pain 3/14/2018    Branch retinal vein occlusion, left eye 2/20/2015    Chronic bilateral low back pain without sciatica 3/23/2017    Chronic renal failure in pediatric patient, stage 3 (moderate) 4/15/2018    Chronic renal failure syndrome, stage 3 (moderate) 4/15/2018    Chronic systolic (congestive) heart failure 8/6/2021    Last Assessment & Plan:  Formatting of this note might be different from the original. 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 Hospitalization late 2021, w/ rehab to follow (no notes available) --12/13/2021-CT head revealed thin extra-axial  hyperdense collection overlying the right cerebral convexity compatible with acute subdural hematoma, neurosurgery was consulted, patient was noted with Keppra 500 mg b.i.d., apixaban was held -12/19/    Iron deficiency anemia secondary to inadequate dietary iron intake 6/24/2013    Joint pain     Keratoconjunctivitis sicca of both eyes not specified as Sjogren's 7/29/2016    Leiomyoma of uterus, unspecified 9/16/2014    Long QT interval 6/29/2016    Due to medication (plaquenil)     Macular edema 1/12/2015    Multinodular goiter 1/12/2016    Neuropathy 8/23/2017    Plaquenil causing adverse effect in therapeutic use 10/7/2016    Pneumococcal vaccine refused 8/17/2016    Pneumonia due to Streptococcus pneumoniae 4/5/2018    Poor nutrition 12/23/2018    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/2015    RA (rheumatoid arthritis) 12/13/2021    -managed by rhematology, since this is a duplicate problem list entry, will archive this     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     Past Surgical History:   Procedure Laterality Date    BREAST CYST EXCISION      CATARACT EXTRACTION      COLONOSCOPY N/A 9/29/2015    Procedure: COLONOSCOPY;  Surgeon: FIDELINA Sanchez MD;  Location: [facility] ENDO ([address] [place] [address]);  Service: Endoscopy;  Laterality: N/A;    EYE SURGERY      INJECTION OF ANESTHETIC AGENT AROUND NERVE Left 4/19/2021    Procedure: BLOCK, NERVE, FEMORAL AND OBTURATOR;  Surgeon: Shivam Gonzalez MD;  Location: [facility] PAIN MGT;  Service: Pain Management;  Laterality: Left;  consent needed    JOINT REPLACEMENT      right knee    KNEE SURGERY Left 12/31/2013    TKR    left  parotidectomy      mixed tumor    MD EVAL,SWALLOW FUNCTION,CINE/VIDEO RECORD  6/5/2021         SALIVARY GLAND SURGERY      TONSILLECTOMY      UPPER GASTROINTESTINAL ENDOSCOPY       Family History   Problem Relation Age of Onset    Hypertension Mother     Heart disease Mother     Hyperthyroidism Mother     Prostate cancer Father         prostate cancer    Cancer Father     Breast cancer Maternal Grandmother     Hyperthyroidism Other     Colon cancer Neg Hx      Social History     Tobacco Use    Smoking status: Never     Passive exposure: Never    Smokeless tobacco: Never   Substance Use Topics    Alcohol use: Yes     Alcohol/week: 0.0 standard drinks of alcohol     Comment: very seldom     Drug use: No     Review of Systems    Physical Exam     Initial Vitals [01/29/24 1807]   BP Pulse Resp Temp SpO2   (!) 140/73 (!) 140 (!) 46 98.8 °F (37.1 °C) 97 %      MAP       --         Physical Exam    ED Course   Procedures  Labs Reviewed   HIV 1 / 2 ANTIBODY   HEPATITIS C ANTIBODY          Imaging Results    None          Medications - No data to display  Medical Decision Making                                    Clinical Impression:  Final diagnoses:  [R09.02] Hypoxia

## 2024-01-30 NOTE — HPI
Selma Lux is a 88 y.o. female with RA with ILD on Cellcept and prednisone,  history of CVA with resultant vascular dementia, L hemiparesis, and debility/bedbound status, AFib, HTN, HFpEF who presents today with hypoxia.     History is provided by her daughter at bedside as she is unable to provide due to her dementia.     She states that at baseline, her mother has advancing dementia with waxing and waning mental status, which is unfortunately recently progressing. She is bedbound and favors lying to her R side given her L hemiparesis. Last week, she developed congestion and mucous production, so her PCP obtained a CXR which showed PNA. She has been on antibiotics since then, and was placed on oxygen as well.  Today, she was taken to a Rheumatology appointment, and her daughter noted that she appeared more fatigued than normal. After she was brought back to her care home home, she reportedly was more tachypnic and hypoxic, therefore she was brought here for evaluation.     She was initially tachycardic, tachypneic, and had large pleural effusion/pneumonia in the right on chest x-ray along with lactic acidosis and leukocytosis.  She was given vancomycin and Zosyn, and hospital medicine consulted for admission.    Per daughter at bedside, she is currently around her baseline mental status, however this waxes and wanes.  Her p.o. intake has been poor recently.

## 2024-01-30 NOTE — ASSESSMENT & PLAN NOTE
Presents from her [facility] center with increased tachypnea and hypoxia.  Recently diagnosed with pneumonia last week and started on oral antibiotics along with oxygen.  PO2 is decreased on ABG, however appears to be chronically decreased.  Suspect pneumonia on chronic interstitial lung disease from rheumatoid arthritis.  We will continue oxygen to maintain sats 88% or greater.  Low suspicion for PE given chronic Eliquis use.

## 2024-01-30 NOTE — ED PROVIDER NOTES
Encounter Date: 1/29/2024       History     Chief Complaint   Patient presents with    Hypoxia     Pt coming from [facility] for RA sat 85%, arrives on 97% 3L O2 NC, on abx for pneumonia; +tachypneic, a fib rvr 140s, significant cardiac hx     88-year-old female with a past medical history of chronic left-sided weakness due to prior CVA, rheumatoid arthritis, COPD, CKD stage 3, chronic diastolic heart failure presents to the ED via EMS from [facility] for hypoxia.  Patient noted to be 85% SpO2 on room air and upon arrival of EMS patient was placed on 3 L nasal cannula and improved to 97%.  Patient was been on antibiotics for pneumonia.  History is extremely limited due to the change in mental status.  No other complaints at this time.    The history is provided by the patient and medical records.     Review of patient's allergies indicates:  No Known Allergies  Past Medical History:   Diagnosis Date    Acute cystitis without hematuria 2/27/2020    Acute hypoxemic respiratory failure 4/11/2018    Acute respiratory failure with hypoxia 3/2/2020    KERMIT (acute kidney injury) 3/13/2019    Altered mental status     Anemia     Arthritis     Bilateral hand pain 3/14/2018    Branch retinal vein occlusion, left eye 2/20/2015    Chronic bilateral low back pain without sciatica 3/23/2017    Chronic renal failure in pediatric patient, stage 3 (moderate) 4/15/2018    Chronic renal failure syndrome, stage 3 (moderate) 4/15/2018    Chronic systolic (congestive) heart failure 8/6/2021    Last Assessment & Plan:  Formatting of this note might be different from the original. 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 Hospitalization late 2021, w/ rehab to follow (no notes available) --12/13/2021-CT head revealed thin extra-axial hyperdense collection overlying the right cerebral convexity compatible with acute subdural hematoma, neurosurgery was consulted, patient was noted with Keppra 500 mg b.i.d., apixaban was held -12/19/    Iron deficiency anemia secondary to inadequate dietary iron intake 6/24/2013    Joint pain     Keratoconjunctivitis sicca of both eyes not specified as Sjogren's 7/29/2016    Leiomyoma of uterus, unspecified 9/16/2014    Long QT interval 6/29/2016    Due to medication (plaquenil)     Macular edema 1/12/2015    Multinodular goiter 1/12/2016    Neuropathy 8/23/2017    Plaquenil causing adverse effect in therapeutic use 10/7/2016    Pneumococcal vaccine refused 8/17/2016    Pneumonia due to Streptococcus pneumoniae 4/5/2018    Poor nutrition 12/23/2018    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/2015    RA (rheumatoid arthritis) 12/13/2021    -managed by rhematology, since this is a duplicate problem list entry, will archive this     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     Past Surgical  History:   Procedure Laterality Date    BREAST CYST EXCISION      CATARACT EXTRACTION      COLONOSCOPY N/A 9/29/2015    Procedure: COLONOSCOPY;  Surgeon: FIDELINA Sanchez MD;  Location: [facility] (4TH FLR);  Service: Endoscopy;  Laterality: N/A;    EYE SURGERY      INJECTION OF ANESTHETIC AGENT AROUND NERVE Left 4/19/2021    Procedure: BLOCK, NERVE, FEMORAL AND OBTURATOR;  Surgeon: Shiavm Gonzalez MD;  Location: [facility] PAIN MGT;  Service: Pain Management;  Laterality: Left;  consent needed    JOINT REPLACEMENT      right knee    KNEE SURGERY Left 12/31/2013    TKR    left parotidectomy      mixed tumor    SC EVAL,SWALLOW FUNCTION,CINE/VIDEO RECORD  6/5/2021         SALIVARY GLAND SURGERY      TONSILLECTOMY      UPPER GASTROINTESTINAL ENDOSCOPY       Family History   Problem Relation Age of Onset    Hypertension Mother     Heart disease Mother     Hyperthyroidism Mother     Prostate cancer Father         prostate cancer    Cancer Father     Breast cancer Maternal Grandmother     Hyperthyroidism Other     Colon cancer Neg Hx      Social History     Tobacco Use    Smoking status: Never     Passive exposure: Never    Smokeless tobacco: Never   Substance Use Topics    Alcohol use: Yes     Alcohol/week: 0.0 standard drinks of alcohol     Comment: very seldom     Drug use: No     Review of Systems    Physical Exam     Initial Vitals [01/29/24 1807]   BP Pulse Resp Temp SpO2   (!) 140/73 (!) 140 (!) 46 98.8 °F (37.1 °C) 97 %      MAP       --         Physical Exam    Nursing note and vitals reviewed.  Constitutional: Vital signs are normal. She appears well-developed and well-nourished. She is not diaphoretic. No distress.   HENT:   Head: Normocephalic and atraumatic.   Right Ear: External ear normal.   Left Ear: External ear normal.   Dry mucous membranes.   Eyes: EOM are normal. Right eye exhibits no discharge. Left eye exhibits no discharge.   Neck: Trachea normal. Neck supple. No thyroid mass present.   Normal range of  motion.  Cardiovascular:  Regular rhythm, normal heart sounds and intact distal pulses.     Exam reveals no gallop and no friction rub.       No murmur heard.  Tachycardic.   Pulmonary/Chest: No respiratory distress. She has no wheezes. She has no rhonchi. She has rales (RLL).   Abdominal: Abdomen is soft. Bowel sounds are normal. She exhibits no distension. There is no abdominal tenderness. There is no rebound and no guarding.   Musculoskeletal:         General: No tenderness or edema. Normal range of motion.      Cervical back: Normal range of motion and neck supple.     Neurological: She is alert and oriented to person, place, and time. She has normal strength. No cranial nerve deficit or sensory deficit. GCS score is 15. GCS eye subscore is 4. GCS verbal subscore is 5. GCS motor subscore is 6.   Skin: Skin is warm and dry. Capillary refill takes less than 2 seconds. No rash noted.   Psychiatric: She has a normal mood and affect.         ED Course   Procedures  Labs Reviewed   CBC W/ AUTO DIFFERENTIAL - Abnormal; Notable for the following components:       Result Value    WBC 13.71 (*)     RBC 3.86 (*)     Hemoglobin 9.4 (*)     Hematocrit 31.1 (*)     MCV 81 (*)     MCH 24.4 (*)     MCHC 30.2 (*)     RDW 18.8 (*)     Platelets 136 (*)     Immature Granulocytes 1.2 (*)     Immature Grans (Abs) 0.17 (*)     Mono # 4.3 (*)     Lymph % 10.0 (*)     Mono % 31.0 (*)     All other components within normal limits   COMPREHENSIVE METABOLIC PANEL - Abnormal; Notable for the following components:    Chloride 112 (*)     CO2 14 (*)     Glucose 55 (*)     Albumin 2.9 (*)     Total Bilirubin 1.6 (*)     All other components within normal limits   URINALYSIS, REFLEX TO URINE CULTURE - Abnormal; Notable for the following components:    Appearance, UA Hazy (*)     Protein, UA 1+ (*)     Occult Blood UA 1+ (*)     Leukocytes, UA 3+ (*)     All other components within normal limits    Narrative:     Specimen Source->Urine   B-TYPE  NATRIURETIC PEPTIDE - Abnormal; Notable for the following components:     (*)     All other components within normal limits   PROCALCITONIN - Abnormal; Notable for the following components:    Procalcitonin 0.44 (*)     All other components within normal limits    Narrative:     Release to patient->Immediate    PER CHRISTIE GARCIA MD REQUEST ADD ON PROCALCITONIN (ORDER   [id number])  01/30/2024  03:40    URINALYSIS MICROSCOPIC - Abnormal; Notable for the following components:    RBC, UA 35 (*)     WBC, UA 51 (*)     Bacteria Moderate (*)     Non-Squam Epith 3 (*)     All other components within normal limits    Narrative:     Specimen Source->Urine   MAGNESIUM - Abnormal; Notable for the following components:    Magnesium 1.5 (*)     All other components within normal limits   COMPREHENSIVE METABOLIC PANEL - Abnormal; Notable for the following components:    Sodium 135 (*)     CO2 19 (*)     Calcium 8.6 (*)     Total Protein 5.3 (*)     Albumin 2.6 (*)     Total Bilirubin 1.6 (*)     Anion Gap 7 (*)     All other components within normal limits   CBC W/ AUTO DIFFERENTIAL - Abnormal; Notable for the following components:    RBC 3.40 (*)     Hemoglobin 8.4 (*)     Hematocrit 27.0 (*)     MCV 79 (*)     MCH 24.7 (*)     MCHC 31.1 (*)     RDW 18.4 (*)     Platelets 120 (*)     Immature Granulocytes 1.0 (*)     Gran # (ANC) 8.0 (*)     Immature Grans (Abs) 0.12 (*)     Lymph # 0.6 (*)     Mono # 2.7 (*)     Lymph % 5.3 (*)     Mono % 23.3 (*)     All other components within normal limits   APTT - Abnormal; Notable for the following components:    aPTT 36.4 (*)     All other components within normal limits    Narrative:     Draw baseline aPTT prior to starting the heparin bolus or  infusion  (if patient is on warfarin prior to heparin therapy)   PROTIME-INR - Abnormal; Notable for the following components:    Prothrombin Time 15.0 (*)     INR 1.4 (*)     All other components within normal limits    Narrative:      Draw baseline aPTT prior to starting the heparin bolus or  infusion  (if patient is on warfarin prior to heparin therapy)   CBC W/ AUTO DIFFERENTIAL - Abnormal; Notable for the following components:    RBC 3.28 (*)     Hemoglobin 8.0 (*)     Hematocrit 26.1 (*)     MCV 80 (*)     MCH 24.4 (*)     MCHC 30.7 (*)     RDW 18.6 (*)     Platelets 108 (*)     Immature Granulocytes 1.4 (*)     Immature Grans (Abs) 0.15 (*)     Mono # 2.7 (*)     Lymph % 10.3 (*)     Mono % 25.0 (*)     All other components within normal limits    Narrative:     Draw baseline aPTT prior to starting the heparin bolus or  infusion  (if patient is on warfarin prior to heparin therapy)   ISTAT LACTATE - Abnormal; Notable for the following components:    POC Lactate 4.59 (*)     All other components within normal limits   ISTAT PROCEDURE - Abnormal; Notable for the following components:    POC PCO2 25.9 (*)     POC PO2 51 (*)     POC HCO3 17.0 (*)     POC BE -7 (*)     POC TCO2 18 (*)     All other components within normal limits   INFLUENZA A & B BY MOLECULAR   CULTURE, METHICILLIN-RESISTANT STAPHYLOCOCCUS AUREUS   CULTURE, AEROBIC  (SPECIFY SOURCE)   CULTURE, ANAEROBIC   GRAM STAIN   AFB CULTURE & SMEAR   CULTURE, FUNGUS   HIV 1 / 2 ANTIBODY    Narrative:     Release to patient->Immediate   HEPATITIS C ANTIBODY    Narrative:     Release to patient->Immediate   TROPONIN I   SARS-COV2 (COVID) WITH FLU/RSV BY PCR   PROCALCITONIN   TSH   TSH    Narrative:     add on tsh per Tim Castillo MD order# [id number] 01/30/2024 @   07:23    LACTIC ACID, PLASMA   WBC & DIFF, BODY FLUID   LDH, BODY FLUID (REFERENCE LAB OR NON-[facility])   PROTEIN, BODY FLUID (REFERENCE LAB OR NON-[facility])   CYTOLOGY SPECIMEN- MEDICAL CYTOLOGY (FLUID/WASH/BRUSH)   ISTAT LACTATE     EKG Readings: (Independently Interpreted)   121 beats per minute.  Sinus tachycardia with PVCs.  Normal axis.  No STEMI.     ECG Results              EKG 12-lead (Final result)  Result time  01/30/24 12:45:00      Final result by Augustina, Lab In [facility] (01/30/24 12:45:00)                   Narrative:    Test Reason : R09.02,    Vent. Rate : 121 BPM     Atrial Rate : 121 BPM     P-R Int : 128 ms          QRS Dur : 072 ms      QT Int : 306 ms       P-R-T Axes : 017 044 007 degrees     QTc Int : 434 ms    Baseline Artifact  Sinus tachycardia with frequent Premature ventricular complexes  Nonspecific T wave abnormality  Abnormal ECG  When compared with ECG of 11-OCT-2023 15:44,  The axis Shifted right  Nonspecific T wave abnormality, worse in Inferior leads    Confirmed by Charles Tucker MD ([id number]) on 1/30/2024 12:44:49 PM    Referred By: [email or web address]   SELF           Confirmed By:Charles Tucker MD                                  Imaging Results               CT Chest With Contrast (Final result)  Result time 01/30/24 04:17:37      Final result by Patricio Rea MD (01/30/24 04:17:37)                   Impression:      1. Moderate volume right-sided pleural effusion with adjacent compressive atelectasis.  Patchy ground-glass opacities in the aerated lungs suggestive of underlying infectious/inflammatory process.  2. Numerous new nodular opacities scattered within the aerated portions of the lungs, with leading differential diagnostic considerations of infectious/inflammatory process versus malignancy.  Correlate clinically, and with follow-up imaging.  3. Enlarged multinodular thyroid gland with complex hypoattenuating substernal mass thought to be of thyroid origin.  This mass was present previously but now causes more mass effect, with new narrowing of the right mainstem bronchus.  Malignancy is not excluded.  Recommend nonemergent outpatient thyroid ultrasound.  4. Artifacts make it difficult to exclude segmental thromboembolism in the right lower lobe.  Correlate clinically.  Consider follow-up chest CTA if there is strong clinical suspicion for thromboembolism.  Additional findings as detailed above.  This  report was flagged in Epic as abnormal.    Electronically signed by resident: Charlotte Sinha  Date:    01/30/2024  Time:    02:38    Electronically signed by: Patricio Rea  Date:    01/30/2024  Time:    04:17               Narrative:    EXAMINATION:  CT CHEST WITH CONTRAST    CLINICAL HISTORY:  Dyspnea, chronic, chest wall or pleura disease suspected;pleural effusion;    TECHNIQUE:  Low dose axial images, sagittal and coronal reformations were obtained from the thoracic inlet to the lung bases following the IV administration of 75 mL of Omnipaque 350.    COMPARISON:  Chest radiograph 01/29/2024, 10/09/2023    CT renal stone study 09/05/2023    FINDINGS:  Motion degraded study.    Base of Neck: Enlarged multinodular thyroid gland with a large heterogeneously hypoattenuating substernal component that exerts mass effect upon and causes mild narrowing of the trachea and right mainstem bronchus.  This lesion contains internal calcifications and measures approximately 5.7 x 4.9 cm in axial dimension (series 2, image 38), and approximately 5.8 cm in craniocaudal extent.    Thoracic soft tissues: Unremarkable.    Thoracic aorta: Left-sided aortic arch.  No aneurysm.  Mild calcific atherosclerosis    Heart: Heart is enlarged.    Pulmonary vasculature: Pulmonary arteries distribute normally.  Artifacts make it difficult to exclude segmental pulmonary thromboembolism in the right lower lobe.  There are four pulmonary veins.    Sowmya/Mediastinum: No pathologic sondra enlargement.    Airways: Markedly narrowed right mainstem bronchus secondary to extrinsic mass effect from the substernal thyroid mass.    Lungs/Pleura: Moderate volume right-sided pleural effusion atelectasis in adjacent portions of the lungs could be on the basis of both compression from the pleural fluid and postobstructive phenomena from the narrowed (though not obstructed) right mainstem bronchus, progressed from prior study.  Patchy ground-glass opacities in  the aerated portions of the lungs.    Numerous nodular opacity scattered throughout the aerated lungs are new since the CT of 09/05/2023.    Esophagus: Unremarkable.    Upper Abdomen: No abnormality of the partially imaged upper abdomen.    Bones: Diffuse degenerative changes of the visualized osseous structures.  No acute fracture.                                       CT Head Without Contrast (Final result)  Result time 01/29/24 21:30:35      Final result by Abdirahman Gaytan MD (01/29/24 21:30:35)                   Impression:      1. No acute intracranial process.  Significant motion artifact may diminish the sensitivity.  2. Involutional changes with chronic microvascular ischemic changes.  Small remote lacunar infarct of the right cerebellum and right corona radiata.      Electronically signed by: Abdirahman Gaytan  Date:    01/29/2024  Time:    21:30               Narrative:    EXAMINATION:  CT HEAD WITHOUT CONTRAST    CLINICAL HISTORY:  Mental status change, unknown cause;    TECHNIQUE:  Low dose axial CT images obtained throughout the head without intravenous contrast. Sagittal and coronal reconstructions were performed.    COMPARISON:  10/13/2023, 10/09/2023    FINDINGS:  Intracranial compartment:    No midline shift or acute hydrocephalus.  Stable mild prominence of the ventricular system.  Third ventricle measures approximately 1.2 cm.  No extra-axial blood or fluid collections.    Moderate involutional changes and chronic microvascular ischemic changes in the periventricular and subcortical white matter.  Significant motion artifact diminishes the sensitivity.    Small remote lacunar infarct of the right cerebellum.  Small remote lacunar infarct of the right corona radiata.  No parenchymal mass, hemorrhage, edema or major vascular distribution infarct.    Skull/extracranial contents (limited evaluation): No fracture. Mastoid air cells and paranasal sinuses are essentially clear.                                        X-Ray Chest AP Portable (Final result)  Result time 01/29/24 21:52:43      Final result by Abdirahman Gaytan MD (01/29/24 21:52:43)                   Impression:      Interval worsening from the prior study.  See above comments.  Follow-up recommended.      Electronically signed by: Abdirahman Gaytan  Date:    01/29/2024  Time:    21:52               Narrative:    EXAMINATION:  XR CHEST AP PORTABLE    CLINICAL HISTORY:  Sepsis;    TECHNIQUE:  Single frontal view of the chest was performed.    COMPARISON:  10/09/2023    FINDINGS:  Prominent opacification of the right hemithorax possibly a large pleural effusion with loculation at the periphery and apex.  Mild aeration centrally.    Right upper mediastinal mass could represent thyroid goiter with mild tracheal deviation to the left similar to prior studies.    Bilateral interstitial and patchy alveolar infiltrates, increased from the prior study.    Small effusion at the left lung base also.                                       X-Ray Hand 3 view Left (Final result)  Result time 01/29/24 20:38:09      Final result by Keenan Machuca MD (01/29/24 20:38:09)                   Impression:      No acute displaced fracture-dislocation identified.    Grossly stable chronic findings as above.      Electronically signed by: Keenan Machuca MD  Date:    01/29/2024  Time:    20:38               Narrative:    EXAMINATION:  XR HAND COMPLETE 3 VIEW LEFT    CLINICAL HISTORY:  L thumb;.    TECHNIQUE:  PA, lateral, and oblique views of the left hand were performed.    COMPARISON:  Bilateral hand series 04/06/2023    FINDINGS:  Monitoring leads at the distal 2nd and 3rd fingers somewhat limits evaluation.    Generalized osteopenia.  Ulnar minus variance.  No displaced fracture, dislocation or destructive osseous process.  Similar subchondral sclerosis, subchondral cyst formation and advanced narrowing at 1st CMC, 1st MCP and 1st interphalangeal joints.  Similar osteophyte  formation, joint space narrowing with central erosions at the 2nd through 5th DIP joints.  Similar subluxation at the 1st IP and 4th and 5th DIP joints.  No subcutaneous emphysema or radiodense retained foreign body.                                       Medications   LIDOcaine (PF) 10 mg/ml (1%) injection 100 mg (100 mg Infiltration Not Given 1/29/24 2200)   vancomycin - pharmacy to dose (has no administration in time range)   ceFEPIme (MAXIPIME) 2 g in dextrose 5 % in water (D5W) 100 mL IVPB (MB+) (0 g Intravenous Stopped 2/2/24 0552)   albuterol-ipratropium 2.5 mg-0.5 mg/3 mL nebulizer solution 3 mL (has no administration in time range)   atorvastatin tablet 40 mg (40 mg Oral Given 2/1/24 2111)   fluticasone furoate-vilanteroL 100-25 mcg/dose diskus inhaler 1 puff (1 puff Inhalation Given 2/1/24 0945)   gabapentin capsule 100 mg (100 mg Oral Given 2/1/24 2111)   hydroxychloroquine tablet 200 mg (200 mg Oral Given 2/1/24 0826)   pantoprazole EC tablet 40 mg (40 mg Oral Given 2/1/24 0826)   predniSONE tablet 10 mg (10 mg Oral Given 2/1/24 0826)   QUEtiapine tablet 25 mg (25 mg Oral Given 2/1/24 2111)   thiamine tablet 100 mg (100 mg Oral Given 2/1/24 0826)   sodium chloride 0.9% flush 1-10 mL (has no administration in time range)   melatonin tablet 6 mg (6 mg Oral Given 2/1/24 0047)   ondansetron disintegrating tablet 8 mg (has no administration in time range)   polyethylene glycol packet 17 g (has no administration in time range)   acetaminophen tablet 650 mg (has no administration in time range)   simethicone chewable tablet 80 mg (has no administration in time range)   aluminum-magnesium hydroxide-simethicone 200-200-20 mg/5 mL suspension 30 mL (has no administration in time range)   acetaminophen tablet 650 mg (has no administration in time range)   naloxone 0.4 mg/mL injection 0.02 mg (has no administration in time range)   guaiFENesin 100 mg/5 ml syrup 200 mg (200 mg Oral Given 2/2/24 0522)   vancomycin 750  mg in dextrose 5 % (D5W) 250 mL IVPB (Vial-Mate) (0 mg Intravenous Stopped 2/2/24 0021)   heparin 25,000 units in dextrose 5% (100 units/ml) IV bolus from bag - ADDITIONAL PRN BOLUS - 30 units/kg (has no administration in time range)   heparin 25,000 units in dextrose 5% (100 units/ml) IV bolus from bag - ADDITIONAL PRN BOLUS - 60 units/kg (has no administration in time range)   heparin 25,000 units in dextrose 5% 250 mL (100 units/mL) infusion LOW INTENSITY nomogram - [facility] (14 Units/kg/hr × 61.5 kg (Adjusted) Intravenous No Dose/Rate Change 2/2/24 0204)   lactated ringers bolus 1,000 mL (0 mLs Intravenous Stopped 1/29/24 1948)   lactated ringers bolus 1,000 mL (0 mLs Intravenous Stopped 1/29/24 2257)   vancomycin 1.75 g in 5 % dextrose 500 mL IVPB (0 mg Intravenous Stopped 1/30/24 0024)   piperacillin-tazobactam (ZOSYN) 4.5 g in dextrose 5 % in water (D5W) 100 mL IVPB (MB+) (0 g Intravenous Stopped 1/29/24 2150)   dextrose 10% bolus 250 mL 250 mL (0 mLs Intravenous Stopped 1/29/24 2003)   iohexoL (OMNIPAQUE 350) injection 75 mL (75 mLs Intravenous Given 1/29/24 2345)     Medical Decision Making  88-year-old female who appears to be altered and distress presents to the ED via EMS for hypoxia.  ABC's intact.  Initial triage vital signs reveal tachypnea, tachycardia and mild hypertension.    Differential diagnosis includes but is not limited to pneumonia, pneumothorax, ACS, cardiac arrhythmia, electrolyte abnormality, anemia, intracranial hemorrhage    Amount and/or Complexity of Data Reviewed  Labs: ordered. Decision-making details documented in ED Course.  Radiology: ordered. Decision-making details documented in ED Course.    Risk  Prescription drug management.  Decision regarding hospitalization.              Attending Attestation:   Physician Attestation Statement for Resident:  As the supervising MD   Physician Attestation Statement: I have personally seen and examined this patient.   I agree with the above  history.  -:   As the supervising MD I agree with the above PE.     As the supervising MD I agree with the above treatment, course, plan, and disposition.    I have reviewed and agree with the residents interpretation of the following: lab data, x-rays, EKG and CT scans.         Attending Critical Care:   Critical Care Times:   Direct Patient Care (initial evaluation, reassessments, and time considering the case)................................................................25 minutes.   Ordering, Reviewing, and Interpreting Diagnostic Studies...............................................................................................................5 minutes.   Documentation..................................................................................................................................................................................5 minutes.   ==============================================================  Total Critical Care Time - exclusive of procedural time: 35 minutes.  ==============================================================  Critical care was necessary to treat or prevent imminent or life-threatening deterioration of the following conditions: sepsis.   Critical care was time spent personally by me on the following activities: obtaining history from patient or relative, examination of patient, ordering lab, x-rays, and/or EKG, development of treatment plan with patient or relative, ordering and performing treatments and interventions, re-evaluation of patient's conition, interpretation of cardiac measurements and evaluation of patient's response to treatment.   Critical Care Condition: potentially life-threatening       Attending ED Notes:   STAFF ATTENDING PHYSICIAN NOTE:  I have individually/jointly evaluated Selma Lux and discussed their ED management with the resident physician. I have also reviewed their notes, assessments, and procedures documented.  I was present  during all critical portions of any procedure(s) performed on Selma Lux.   ____________________  Nathan Marcano MD, FAAEM  Emergency Medicine Staff        ED Course as of 02/02/24 0627   Mon Jan 29, 2024   1843 Following initial evaluation sepsis order set used to initiate sepsis workup.  Patient appears very dry so will give her 30 cc/kilogram bolus.  Will initiate broad-spectrum antibiotics as patient was already been treated for pneumonia.  Per bedside RN patient was more agitated and altered from baseline.  Will re-evaluate following initial results. [BG]   1920 POC Lactate(!!): 4.59  Will repeat after fluid resuscitation. [BG]   1921 BNP(!): 176  Does not appear acutely fluid overloaded on physical exam. [BG]   1921 Troponin I: 0.018  Decreases likelihood of ACS. [BG]   1921 CBC auto differential(!)  Mild leukocytosis.  Stable anemia. [BG]   1921 Comprehensive metabolic panel(!)  Decreased bicarb in the setting of elevated BUN/creatinine ratio indicates a likely prerenal azotemia. [BG]   1921 Glucose(!): 55  Will either p.o. challenge or give patient dextrose. [BG]   1931 POC PO2(!!): 51  Mildly hypoxic on ABG.  Will place patient on nasal cannula. [BG]   1931 Influenza A & B by Molecular  Negative. [BG]   2038 SARS-Cov2 (COVID) with FLU/RSV by PCR  Negative. [BG]   2104 X-Ray Hand 3 view Left  No acute displaced fracture-dislocation identified. [BG]   2135 CT Head Without Contrast  No acute intracranial process.  Significant motion artifact may diminish the sensitivity. [BG]   2221 POC Lactate: 1.54  Improved lactate following fluid resuscitation. [BG]   2237 Patient will be admitted to Hospital Medicine under inpatient designation for further workup and evaluation of hypoxia requiring supplemental oxygen.  I discussed the plan with the patient's daughter who is at bedside who understands and agrees with the plan. [BG]      ED Course User Index  [BG] Ian Paige MD                            Clinical Impression:  Final diagnoses:  [R09.02] Hypoxia          ED Disposition Condition    Admit                 Ian Paige MD  Resident  01/29/24 2237       Chemo Marcano MD  02/02/24 0625       Chemo Marcano MD  02/02/24 0627

## 2024-01-30 NOTE — PT/OT/SLP EVAL
Speech Language Pathology Evaluation  Bedside Swallow    Patient Name:  Selma Lux   MRN:  [id number]  Admitting Diagnosis: Sepsis due to pneumonia    Recommendations:                 General Recommendations:   Diet follow up  Diet recommendations:  Regular Diet - IDDSI Level 7, Thin liquids - IDDSI Level 0   *ONLY if alert, upright, and accepting, hold if altered  Mostly thins with some puree recommended if altered    Aspiration Precautions: 1 bite/sip at a time, Alternating bites/sips, Assistance with meals, Avoid talking while eating, Eliminate distractions, Feed only when awake/alert, HOB to 90 degrees, Meds whole 1 at a time, Small bites/sips, and Strict aspiration precautions   General Precautions: Standard, aspiration, fall  Communication strategies:  provide increased time to answer and go to room if call light pushed    Assessment:     Selma Lux is a 88 y.o. female with an SLP diagnosis of Dysphagia.  She presents with increased aspiration risk with altered mental status.    History:     Past Medical History:   Diagnosis Date    Acute cystitis without hematuria 2/27/2020    Acute hypoxemic respiratory failure 4/11/2018    Acute respiratory failure with hypoxia 3/2/2020    KERMIT (acute kidney injury) 3/13/2019    Altered mental status     Anemia     Arthritis     Bilateral hand pain 3/14/2018    Branch retinal vein occlusion, left eye 2/20/2015    Chronic bilateral low back pain without sciatica 3/23/2017    Chronic renal failure in pediatric patient, stage 3 (moderate) 4/15/2018    Chronic renal failure syndrome, stage 3 (moderate) 4/15/2018    Chronic systolic (congestive) heart failure 8/6/2021    Last Assessment & Plan:  Formatting of this note might be different from the original. 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 Hospitalization late 2021, w/ rehab to follow (no notes available) --12/13/2021-CT head revealed thin extra-axial hyperdense collection overlying the right cerebral convexity compatible with acute subdural hematoma, neurosurgery was consulted, patient was noted with Keppra 500 mg b.i.d., apixaban was held -12/19/    Iron deficiency anemia secondary to inadequate dietary iron intake 6/24/2013    Joint pain     Keratoconjunctivitis sicca of both eyes not specified as Sjogren's 7/29/2016    Leiomyoma of uterus, unspecified 9/16/2014    Long QT interval 6/29/2016    Due to medication (plaquenil)     Macular edema 1/12/2015    Multinodular goiter 1/12/2016    Neuropathy 8/23/2017    Plaquenil causing adverse effect in therapeutic use 10/7/2016    Pneumococcal vaccine refused 8/17/2016    Pneumonia due to Streptococcus pneumoniae 4/5/2018    Poor nutrition 12/23/2018    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/2015    RA (rheumatoid arthritis) 12/13/2021    -managed by rhematology, since this is a duplicate problem list entry, will archive this     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    s/p Right Total knee replacement 5/15/2013    Scoliosis of thoracic spine 5/15/2013    Small vessel disease, cerebrovascular 12/28/2017    Stroke     Traumatic subdural hemorrhage with loss of consciousness of unspecified  "duration, initial encounter 1/10/2023    12/2021    UTI (urinary tract infection) 12/29/2018    Vascular dementia 12/6/2017       Past Surgical History:   Procedure Laterality Date    BREAST CYST EXCISION      CATARACT EXTRACTION      COLONOSCOPY N/A 9/29/2015    Procedure: COLONOSCOPY;  Surgeon: FIDELINA Sanchez MD;  Location: [facility] ENDO (4TH FLR);  Service: Endoscopy;  Laterality: N/A;    EYE SURGERY      INJECTION OF ANESTHETIC AGENT AROUND NERVE Left 4/19/2021    Procedure: BLOCK, NERVE, FEMORAL AND OBTURATOR;  Surgeon: Shivam Gonzalez MD;  Location: [facility] PAIN MGT;  Service: Pain Management;  Laterality: Left;  consent needed    JOINT REPLACEMENT      right knee    KNEE SURGERY Left 12/31/2013    TKR    left parotidectomy      mixed tumor    AR EVAL,SWALLOW FUNCTION,CINE/VIDEO RECORD  6/5/2021         SALIVARY GLAND SURGERY      TONSILLECTOMY      UPPER GASTROINTESTINAL ENDOSCOPY         Social History: Daughter reports decreased intake lately including softer foods provided.  She also endorsed dry oral mucosa.    Pt last seen by SLP services 10/13/23 with recs for regular diet with thin liquids.      "HPI: Selma Lux is a 88 y.o. female with RA with ILD on Cellcept and prednisone,  history of CVA with resultant vascular dementia, L hemiparesis, and debility/bedbound status, AFib, HTN, HFpEF who presents today with hypoxia.      History is provided by her daughter at bedside as she is unable to provide due to her dementia.      She states that at baseline, her mother has advancing dementia with waxing and waning mental status, which is unfortunately recently progressing. She is bedbound and favors lying to her R side given her L hemiparesis. Last week, she developed congestion and mucous production, so her PCP obtained a CXR which showed PNA. She has been on antibiotics since then, and was placed on oxygen as well.  Today, she was taken to a Rheumatology appointment, and her daughter noted that she " "appeared more fatigued than normal. After she was brought back to her [facility] home, she reportedly was more tachypnic and hypoxic, therefore she was brought here for evaluation.      She was initially tachycardic, tachypneic, and had large pleural effusion/pneumonia in the right on chest x-ray along with lactic acidosis and leukocytosis.  She was given vancomycin and Zosyn, and hospital medicine consulted for admission.     Per daughter at bedside, she is currently around her baseline mental status, however this waxes and wanes.  Her p.o. intake has been poor recently."    Prior Intubation HX:  none this admission     Modified Barium Swallow: no recent    Chest X-Rays: 1/29: "Prominent opacification of the right hemithorax possibly a large pleural effusion with loculation at the periphery and apex.  Mild aeration centrally.   Right upper mediastinal mass could represent thyroid goiter with mild tracheal deviation to the left similar to prior studies.   Bilateral interstitial and patchy alveolar infiltrates, increased from the prior study.   Small effusion at the left lung base also."    Subjective     "I wish you would leave me alone."    Pt alert given cues though inattentive with excessive rhythmic oral motor movements which daughter stated started last evening.  Daughter also reports pt tolerated thin liquids via straw and meds this am without difficulty.  She reports attempt at regular breakfast though pt "rolling eggs around" she believes 2/2 distaste.    Pain/Comfort:  Pain Rating 1:  (pt denied pain though appeared uncomfortable)  Pain Rating Post-Intervention 1:  (no change)    Respiratory Status: Nasal cannula    Objective:     Oral Musculature Evaluation  Oral Musculature: unable to assess due to poor participation/comprehension (appears symmetrical)  Dentition: present and adequate  Mucosal Quality: dry  Voice Prior to PO Intake: clear, decreased sustained phonation    Bedside Swallow Eval: "   Consistencies Assessed:  Thin liquids via tsp/straw  Puree via tsp x2      Oral Phase:   Anterior loss  Decreased closure around utensil  Slow oral transit time  Continued excessive oral motor movements during transit     Pharyngeal Phase:   Coughing after tsp sips of thin and delayed x1 after puree.    No overt s/s aspiration with thin liquids via straw    Compensatory Strategies  None    Treatment: Education provided re: role of SLP, diet recs, swallow precs, s/s aspiration, increased aspiration risk with altered mental status, safest consistencies, and POC.  Daughter verbalized understanding and agreement.       Goals:   Multidisciplinary Problems       SLP Goals          Problem: SLP    Goal Priority Disciplines Outcome   SLP Goal     SLP Ongoing, Progressing   Description: Speech Language Pathology Goals  Goals expected to be met by 2/13  1. Pt will participate in ongoing assessment of swallow.                                  Plan:     Patient to be seen:  3 x/week   Plan of Care expires:  02/29/24  Plan of Care reviewed with:  patient, daughter   SLP Follow-Up:  Yes       Discharge recommendations:   (tbd, likely no needs)     Time Tracking:     SLP Treatment Date:   01/30/24  Speech Start Time:  1015  Speech Stop Time:  1035     Speech Total Time (min):  20 min    Billable Minutes: Eval Swallow and Oral Function 12 and Self Care/Home Management Training 8    01/30/2024

## 2024-01-30 NOTE — ASSESSMENT & PLAN NOTE
Per daughter at bedside, recently with worsening dementia with waxing and waning mental status.  We will continue home Seroquel nightly along with delirium precautions.  1/30 CT head -No acute intracranial process.  Significant motion artifact may diminish the sensitivity. Involutional changes with chronic microvascular ischemic changes.  Small remote lacunar infarct of the right cerebellum and right corona radiata.

## 2024-01-31 PROBLEM — Z51.5 PALLIATIVE CARE ENCOUNTER: Status: ACTIVE | Noted: 2024-01-01

## 2024-01-31 PROBLEM — Z71.89 ACP (ADVANCE CARE PLANNING): Status: ACTIVE | Noted: 2024-01-01

## 2024-01-31 PROBLEM — R04.2 COUGH WITH HEMOPTYSIS: Status: ACTIVE | Noted: 2024-01-01

## 2024-01-31 NOTE — ED NOTES
Pt's speech clearing up. Able to hold small conversations. Daughter remains at bedside. Pt provided extra blanket.

## 2024-01-31 NOTE — HPI
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  Medical history includes COPD, CKD stage 3, chronic left-sided weakness due to history of stroke, adrenal insufficiency (on pred 10), rheumatoid arthritis (immunocompromised on Plaquenil/mycophenolate), and chronic CHF/PHTN.  She had a recent admission for generalized weakness in October 2023.  She had been on antibiotics for recent pneumonia.  Imaging completed on admission showed concerns for pleural effusion/pneumonia of the right lung.  In addition she had findings of a thyroid goiter with known substernal extension and partial compression and mass effect on her trachea.  Palliative care evaluated the patient and spoke with family who apparently did request a consult to endocrinology among other specialists.  Endocrinology was consulted for the thyroid/chest mass.      On evaluation at bedside Dr. Lux is able to provide some limited history but her care taker is at bedside as well helping.  She is aware of the enlarged thyroid goiter with extension into her substernal region and claims this has been present for many years.  She is not on any thyroid medication and labs for thyroid function have been normal.  She has been followed outpatient with endocrine, most recently Dr. Monroy.  She has a history of thyroid nodule biopsies in 2020 x 2 which were both benign.  Her chest imaging over the last 3-4 years have continued to show this known thyroid enlargement with substernal extension with no significant change on the amount of mass effect noted.         Lab Results   Component Value Date    TSH 1.875 01/29/2024    Y0OHHSJ 5.8 08/29/2019    FREET4 1.01 05/06/2022         CT Imaging of the Chest: 1/29/2024  Base of Neck: Enlarged multinodular thyroid gland with a large heterogeneously hypoattenuating substernal component that exerts mass effect upon and causes mild  narrowing of the trachea and right mainstem bronchus.  This lesion contains internal calcifications and measures approximately 5.7 x 4.9 cm in axial dimension (series 2, image 38), and approximately 5.8 cm in craniocaudal extent.

## 2024-01-31 NOTE — PLAN OF CARE
SW attempted DPA at the bedside. DPA not completed at that time secondary to patient meeting w/ MD.    4:05pm  SW re-attempted DPA at the bedside. DPA not completed at that time secondary to patient receiving nursing care.    SW will re-attempt.                GRANT Patel, LMSW  [facility]  Case Management  Ext. [phone]

## 2024-01-31 NOTE — ASSESSMENT & PLAN NOTE
Impression  . 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 Daughter states that at baseline, her mother has advancing dementia with waxing and waning mental status, which is unfortunately recently progressing. At  today, pt is awake and oriented to person and place, answer simple questions, and on 3lpm NC.    Reason for Consult Per communication w/Dr. Castillo, [facility] med consult for assistance with GOC/ACP.    ACP/GOC I spoke with daughter (Madhavi) at length today. She has a very strong knowledge base about all of her mother's co-morbidities and the increasing complications managing them in the face of progressive dementia. She has been utilizing the Dementia clinic's resources for family support and is familiar with the progressive decline of dementia. She was amenable to speaking w/Palliaitve care, as someone in the clinic had recently suggested they speak w/palliative. She has noticed a decline in her mother over the last few months and is ready to have a meaningful GOC discussion. She states her mother is followed by multiple teams (rheumatology, pulm, primary, neuro, and endocrine) and she would like to make sure we are addressing all aspects of care in order to have meaningful GOC. Dr. Castillo at  today and discussed pending thoracentesis and concern of increased size of thyroid mass. I have messaged pt's primary care provider (Dr. Rosario) as per family's request to alert her of admission. Madhavi will speak to her sister and will arrange time for us all to meet tomorrow to further discuss GOC and code status.    [facility] Pt has 2 daughters Madhavi and Rosy who are decision makers.     Symptoms     Recommendations  -Pulmonary to continue following for recs, per family.  - Endocrine requested per family for  recs  -Will meet tomorrow w/ sisters.

## 2024-01-31 NOTE — PROGRESS NOTES
[facility] - Emergency Dept  Hospital Medicine  Progress Note    Patient Name: Selma Lux  MRN: [id number]  Patient Class: IP- Inpatient   Admission Date: 1/29/2024  Length of Stay: 2 days  Attending Physician: Tim Castillo MD  Primary Care Provider: Génesis Rosario MD        Subjective:     Principal Problem:Sepsis due to pneumonia        HPI:  Selma Lux is a 88 y.o. female with RA with ILD on Cellcept and prednisone,  history of CVA with resultant vascular dementia, L hemiparesis, and debility/bedbound status, AFib, HTN, HFpEF who presents today with hypoxia.     History is provided by her daughter at bedside as she is unable to provide due to her dementia.     She states that at baseline, her mother has advancing dementia with waxing and waning mental status, which is unfortunately recently progressing. She is bedbound and favors lying to her R side given her L hemiparesis. Last week, she developed congestion and mucous production, so her PCP obtained a CXR which showed PNA. She has been on antibiotics since then, and was placed on oxygen as well.  Today, she was taken to a Rheumatology appointment, and her daughter noted that she appeared more fatigued than normal. After she was brought back to her long term home, she reportedly was more tachypnic and hypoxic, therefore she was brought here for evaluation.     She was initially tachycardic, tachypneic, and had large pleural effusion/pneumonia in the right on chest x-ray along with lactic acidosis and leukocytosis.  She was given vancomycin and Zosyn, and hospital medicine consulted for admission.    Per daughter at bedside, she is currently around her baseline mental status, however this waxes and wanes.  Her p.o. intake has been poor recently.    Overview/Hospital Course:  1/30 UA + . UC pending.  increased tachypnea and hypoxia.  Recently diagnosed with pneumonia last week and started on oral antibiotics along with oxygen.  sats  93% on 2LNC. pneumonia vs  chronic interstitial lung disease from rheumatoid arthritis.   Large pleural effusion noted on chest x-ray, CT chest -  Moderate volume right-sided pleural effusion with adjacent compressive atelectasis.  Patchy ground-glass opacities in the aerated lungs suggestive of underlying infectious/inflammatory process. Numerous new nodular opacities scattered within the aerated portions of the lungs, with leading differential diagnostic considerations of infectious/inflammatory process versus malignancy. .  Enlarged multinodular thyroid gland with complex hypoattenuating substernal mass thought to be of thyroid origin.  This mass was present previously but now causes more mass effect, with new narrowing of the right mainstem bronchus.  Malignancy is not excluded. Pulmonology consulted, continue cefepime and vancomycin. Low suspicion for PE as on eliquis. SLP consulted  -recs regular diet. Hold home CellCept .  IR consulted for diagnostic thoracentesis . started on heparin drip .  Goiter continues to enlarge and may one day compromise her airway. palliative care consulted for discussion with daughters.  1/31 Eliquis on hold. Heparin drip not started overnight due to concerns for bloody stains on gown. Per staff no witnessed hemoptysis. Hb at 7.6-->8.3 . PTT elevated at baseline 37. started on heparin drip. sats 96% on 3LNC. CBC q 6h.    sputum cultures. pulmonology f/u . Endocrinology consulted for multinodular goiter.  Discussed with the daughter at the bedside. UC - GNR. Identification and susceptibility pending.  SLP to follow up           Review of Systems:   Pain scale: Unable to perform ROS: Dementia    Constitutional:  fever,  chills, headache, vision loss, hearing loss, weight loss, Generalized weakness, falls, loss of smell, loss of taste, poor appetite,  sore throat  Respiratory: cough, shortness of breath.   Cardiovascular: chest pain, dizziness, palpitations, orthopnea, swelling of  feet, syncope  Gastrointestinal: nausea, vomiting, abdominal pain, diarrhea, black stool,  blood in stool, change in bowel habits, constipation  Genitourinary: hematuria, dysuria, urgency, frequency  Integument/Breast: rash,  pruritis  Hematologic/Lymphatic: easy bruising, lymphadenopathy  Musculoskeletal: arthralgias , myalgias, back pain, neck pain, knee pain  Neurological: confusion, seizures, tremors, slurred speech, memory loss  Behavioral/Psych:  depression, anxiety, auditory or visual hallucinations     OBJECTIVE:     Physical Exam:  Body mass index is 27.12 kg/m².    Constitutional: Appears well-developed and well-nourished.   Head: Normocephalic and atraumatic.   Neck: Normal range of motion. Neck supple.   Cardiovascular: Normal heart rate.  irregular heart rhythm.  Pulmonary/Chest: Effort normal.   Abdominal: No distension.  No tenderness  Musculoskeletal: Normal range of motion. No edema. baseline left hemiparesis   Neurological: Alert and oriented to person,  Skin: Skin is warm and dry.   Psychiatric: Normal mood and affect. Behavior is normal.                  Vital Signs  Temp: 98.4 °F (36.9 °C) (01/31/24 1144)  Pulse: 90 (01/31/24 1243)  Resp: 20 (01/31/24 1243)  BP: 124/61 (01/31/24 1144)  SpO2: 95 % (01/31/24 1243)     24 Hour VS Range    Temp:  [97.8 °F (36.6 °C)-98.4 °F (36.9 °C)]   Pulse:  []   Resp:  [17-20]   BP: (109-131)/(51-61)   SpO2:  [90 %-97 %]     Intake/Output Summary (Last 24 hours) at 1/31/2024 1358  Last data filed at 1/31/2024 0942  Gross per 24 hour   Intake 317.78 ml   Output 500 ml   Net -182.22 ml         I/O This Shift:  I/O this shift:  In: 120 [P.O.:120]  Out: -     Wt Readings from Last 3 Encounters:   01/30/24 71.7 kg (158 lb)   01/29/24 71.7 kg (158 lb 1.1 oz)   10/14/23 71.7 kg (158 lb)       I have personally reviewed the vitals and recorded Intake/Output     Laboratory/Diagnostic Data:    CBC/Anemia Labs: Coags:    Recent Labs   Lab 01/31/24  0231 01/31/24  0772  "01/31/24  1131   WBC 12.65  12.65 14.88* 14.54*   HGB 7.7*  7.7* 8.3* 8.4*   HCT 25.3*  25.3* 26.9* 27.2*   *  109* 104* 112*   MCV 79*  79* 81* 81*   RDW 18.2*  18.2* 18.4* 18.5*    Recent Labs   Lab 01/30/24  1942 01/31/24  0231 01/31/24  1040   INR 1.4*  --   --    APTT 36.4* 37.6* 32.5*        Chemistries: ABG:   Recent Labs   Lab 01/29/24  1836 01/30/24  1351 01/31/24  0231    135* 134*   K 4.9 3.8 3.5   * 109 105   CO2 14* 19* 19*   BUN 23 18 17   CREATININE 0.9 0.9 0.9   CALCIUM 8.7 8.6* 8.5*   PROT 6.1 5.3* 5.2*   BILITOT 1.6* 1.6* 1.1*   ALKPHOS 57 55 53*   ALT 12 10 7*   AST 18 11 10   MG  --  1.5* 1.6    Recent Labs   Lab 01/29/24  1905   PH 7.423   PCO2 25.9*   PO2 51*   HCO3 17.0*   POCSATURATED 88   BE -7*        POCT Glucose: HbA1c:    No results for input(s): "POCTGLUCOSE" in the last 168 hours. Hemoglobin A1C   Date Value Ref Range Status   06/20/2022 5.3 4.0 - 5.6 % Final     Comment:     ADA Screening Guidelines:  5.7-6.4%  Consistent with prediabetes  >or=6.5%  Consistent with diabetes    High levels of fetal hemoglobin interfere with the HbA1C  assay. Heterozygous hemoglobin variants (HbS, HgC, etc)do  not significantly interfere with this assay.   However, presence of multiple variants may affect accuracy.     12/13/2021 5.3 4.0 - 5.6 % Final     Comment:     ADA Screening Guidelines:  5.7-6.4%  Consistent with prediabetes  >or=6.5%  Consistent with diabetes    High levels of fetal hemoglobin interfere with the HbA1C  assay. Heterozygous hemoglobin variants (HbS, HgC, etc)do  not significantly interfere with this assay.   However, presence of multiple variants may affect accuracy.     08/27/2019 5.6 4.0 - 5.6 % Final     Comment:     ADA Screening Guidelines:  5.7-6.4%  Consistent with prediabetes  >or=6.5%  Consistent with diabetes  High levels of fetal hemoglobin interfere with the HbA1C  assay. Heterozygous hemoglobin variants (HbS, HgC, etc)do  not significantly " interfere with this assay.   However, presence of multiple variants may affect accuracy.          Cardiac Enzymes: Ejection Fractions:    Recent Labs     01/29/24  1836   TROPONINI 0.018    EF   Date Value Ref Range Status   10/14/2023 65 % Final   12/16/2022 65 % Final          Recent Labs   Lab 01/30/24  0334   COLORU Yellow   APPEARANCEUA Hazy*   PHUR 6.0   SPECGRAV 1.020   PROTEINUA 1+*   GLUCUA Negative   KETONESU Negative   BILIRUBINUA Negative   OCCULTUA 1+*   NITRITE Negative   LEUKOCYTESUR 3+*   RBCUA 35*   WBCUA 51*   BACTERIA Moderate*   SQUAMEPITHEL 35   HYALINECASTS 0       Procalcitonin (ng/mL)   Date Value   01/29/2024 0.44 (H)   10/09/2023 0.43 (H)   05/12/2023 0.14   01/23/2022 1.68 (H)   12/22/2021 0.67 (H)     Lactate (Lactic Acid) (mmol/L)   Date Value   01/30/2024 1.2   09/03/2023 1.2   08/31/2023 1.7   10/11/2022 0.7   10/11/2022 0.7     BNP (pg/mL)   Date Value   01/29/2024 176 (H)   10/11/2022 113 (H)   01/23/2022 116 (H)   08/26/2019 70   04/14/2019 142 (H)     CRP (mg/L)   Date Value   04/06/2023 3.9   01/23/2022 254.5 (H)   06/08/2021 4.2   10/27/2020 35.9 (H)   12/09/2019 1.5   02/04/2019 3.7   11/06/2018 7.4   10/23/2018 20.6 (H)   10/08/2018 179.7 (H)   02/12/2018 70.5 (H)     Sed Rate (mm/Hr)   Date Value   05/12/2023 11   04/06/2023 7   01/23/2022 54 (H)   06/08/2021 22   10/27/2020 3   12/09/2019 <2   05/21/2019 5   02/04/2019 13   11/06/2018 6   10/23/2018 16     D-Dimer (mg/L FEU)   Date Value   04/08/2018 3.90 (H)     Ferritin   Date Value   10/13/2022 248 ng/mL   06/21/2022 95 ng/mL   01/23/2022 680 ng/mL (H)   02/28/2018 811 ng/mL (H)   08/24/2017 746 ng/mL (H)   07/28/2017 602 ng/mL (H)   02/02/2017 691 ng/mL (H)   08/05/2016 1,246 ng/mL (H)   06/16/2016 1,875 ng/mL (H)   06/24/2013 121 ng/ml     LD (U/L)   Date Value   01/23/2022 618 (H)     Troponin I (ng/mL)   Date Value   01/29/2024 0.018   08/31/2023 0.021   05/12/2023 0.019   10/11/2022 0.019   06/20/2022 0.006    01/23/2022 0.022   08/28/2019 0.013   08/26/2019 0.007     CPK (U/L)   Date Value   06/20/2022 40   12/13/2021 123   02/28/2020 93   08/28/2019 44   02/27/2012 96     CK (U/L)   Date Value   07/13/2022 92     Results for orders placed or performed in visit on 05/06/22   Vitamin D   Result Value Ref Range    Vit D, 25-Hydroxy 24 (L) 30 - 96 ng/mL   Results for orders placed or performed in visit on 04/08/21   Vitamin D   Result Value Ref Range    Vit D, 25-Hydroxy 19 (L) 30 - 96 ng/mL     *Note: Due to a large number of results and/or encounters for the requested time period, some results have not been displayed. A complete set of results can be found in Results Review.     SARS-CoV2 (COVID-19) Qualitative PCR (no units)   Date Value   01/29/2024 Not Detected   10/16/2023 Not Detected   09/11/2023 Not Detected   05/13/2023 Not Detected   05/12/2023 Not Detected   01/11/2023 Not Detected   04/07/2020 Not Detected     SARS-CoV-2 RNA, Amplification, Qual (no units)   Date Value   08/31/2023 Negative   10/11/2022 Negative     POC Rapid COVID (no units)   Date Value   08/30/2022 Negative   06/20/2022 Negative   01/23/2022 Positive (A)   12/13/2021 Negative       Microbiology labs for the last week  Microbiology Results (last 7 days)       Procedure Component Value Units Date/Time    Urine culture [[id number]]  (Abnormal) Collected: 01/30/24 0334    Order Status: Completed Specimen: Urine Updated: 01/31/24 0811     Urine Culture, Routine GRAM NEGATIVE CARLOS  50,000 - 99,999 cfu/ml  Identification and susceptibility pending      Narrative:      Specimen Source->Urine    Culture, Respiratory with Gram Stain [[id number]]     Order Status: No result Specimen: Respiratory     Blood culture x two cultures. Draw prior to antibiotics. [[id number]] Collected: 01/29/24 1843    Order Status: Completed Specimen: Blood from Peripheral, Forearm, Right Updated: 01/30/24 2012     Blood Culture, Routine No Growth to date      No Growth to  date    Narrative:      Aerobic and anaerobic    Blood culture x two cultures. Draw prior to antibiotics. [[id number]] Collected: 01/29/24 1835    Order Status: Completed Specimen: Blood from Peripheral, Hand, Right Updated: 01/30/24 2012     Blood Culture, Routine No Growth to date      No Growth to date    Narrative:      Aerobic and anaerobic    Aerobic culture [[id number]]     Order Status: No result Specimen: Pleural Fluid     Culture, Anaerobic [[id number]]     Order Status: No result Specimen: Pleural Fluid     Gram stain [[id number]]     Order Status: No result Specimen: Pleural Fluid     AFB Culture & Smear [[id number]]     Order Status: No result Specimen: Pleural Fluid     Fungus culture [[id number]]     Order Status: No result Specimen: Pleural Fluid     Culture, MRSA [[id number]]     Order Status: No result Specimen: MRSA source from Nares, Right     Influenza A & B by Molecular [[id number]] Collected: 01/29/24 1838    Order Status: Completed Specimen: Nasopharyngeal Swab Updated: 01/29/24 1931     Influenza A, Molecular Negative     Influenza B, Molecular Negative     Flu A & B Source NP            Reviewed and noted in plan where applicable- Please see chart for full lab data.    Lines/Drains:       Peripheral IV - Single Lumen 01/29/24 1832 20 G Anterior;Right Hand (Active)   Site Assessment Clean;Dry;Intact 01/29/24 1832   Extremity Assessment Distal to IV No abnormal discoloration;No redness;No swelling 01/29/24 1832   Dressing Status Clean;Dry;Intact 01/29/24 1832   Dressing Intervention Integrity maintained 01/29/24 1832   Number of days: 0       Imaging  ECG Results              EKG 12-lead (Final result)  Result time 01/30/24 12:45:00      Final result by Interface, Lab In [facility] (01/30/24 12:45:00)               Narrative:    Test Reason : R09.02,    Vent. Rate : 121 BPM     Atrial Rate : 121 BPM     P-R Int : 128 ms          QRS Dur : 072 ms      QT Int : 306 ms       P-R-T Axes : 017 044  007 degrees     QTc Int : 434 ms    Baseline Artifact  Sinus tachycardia with frequent Premature ventricular complexes  Nonspecific T wave abnormality  Abnormal ECG  When compared with ECG of 11-OCT-2023 15:44,  The axis Shifted right  Nonspecific T wave abnormality, worse in Inferior leads    Confirmed by Charles Tucker MD ([id number]) on 1/30/2024 12:44:49 PM    Referred By: [email or web address]   SELF           Confirmed By:Charles Tucker MD                                  Results for orders placed during the hospital encounter of 10/09/23    Echo    Interpretation Summary    Left Ventricle: The left ventricle is normal in size. Ventricular mass is normal. Normal wall thickness. There is concentric remodeling. Normal wall motion. There is normal systolic function. Ejection fraction by visual approximation is 65%. Grade II diastolic dysfunction.    Left Atrium: Left atrium is severely dilated.    Right Ventricle: Normal right ventricular cavity size. Wall thickness is normal. Right ventricle wall motion  is normal. Systolic function is normal.    Aortic Valve: The aortic valve is a trileaflet valve. There is moderate aortic valve sclerosis. There is mild annular calcification present. There is restricted motion of the left coronary cusp.    Mitral Valve: There is bileaflet sclerosis. Moderately calcified subvalvular apparatus. Mildly restricted motion. There is mild to moderate regurgitation.    Tricuspid Valve: There is mild regurgitation.    Pulmonary Artery: The estimated pulmonary artery systolic pressure is 42 mmHg.    IVC/SVC: Normal venous pressure at 3 mmHg.      CT Chest With Contrast  Narrative: EXAMINATION:  CT CHEST WITH CONTRAST    CLINICAL HISTORY:  Dyspnea, chronic, chest wall or pleura disease suspected;pleural effusion;    TECHNIQUE:  Low dose axial images, sagittal and coronal reformations were obtained from the thoracic inlet to the lung bases following the IV administration of 75 mL of Omnipaque  350.    COMPARISON:  Chest radiograph 01/29/2024, 10/09/2023    CT renal stone study 09/05/2023    FINDINGS:  Motion degraded study.    Base of Neck: Enlarged multinodular thyroid gland with a large heterogeneously hypoattenuating substernal component that exerts mass effect upon and causes mild narrowing of the trachea and right mainstem bronchus.  This lesion contains internal calcifications and measures approximately 5.7 x 4.9 cm in axial dimension (series 2, image 38), and approximately 5.8 cm in craniocaudal extent.    Thoracic soft tissues: Unremarkable.    Thoracic aorta: Left-sided aortic arch.  No aneurysm.  Mild calcific atherosclerosis    Heart: Heart is enlarged.    Pulmonary vasculature: Pulmonary arteries distribute normally.  Artifacts make it difficult to exclude segmental pulmonary thromboembolism in the right lower lobe.  There are four pulmonary veins.    Sowmya/Mediastinum: No pathologic sondra enlargement.    Airways: Markedly narrowed right mainstem bronchus secondary to extrinsic mass effect from the substernal thyroid mass.    Lungs/Pleura: Moderate volume right-sided pleural effusion atelectasis in adjacent portions of the lungs could be on the basis of both compression from the pleural fluid and postobstructive phenomena from the narrowed (though not obstructed) right mainstem bronchus, progressed from prior study.  Patchy ground-glass opacities in the aerated portions of the lungs.    Numerous nodular opacity scattered throughout the aerated lungs are new since the CT of 09/05/2023.    Esophagus: Unremarkable.    Upper Abdomen: No abnormality of the partially imaged upper abdomen.    Bones: Diffuse degenerative changes of the visualized osseous structures.  No acute fracture.  Impression: 1. Moderate volume right-sided pleural effusion with adjacent compressive atelectasis.  Patchy ground-glass opacities in the aerated lungs suggestive of underlying infectious/inflammatory process.  2.  Numerous new nodular opacities scattered within the aerated portions of the lungs, with leading differential diagnostic considerations of infectious/inflammatory process versus malignancy.  Correlate clinically, and with follow-up imaging.  3. Enlarged multinodular thyroid gland with complex hypoattenuating substernal mass thought to be of thyroid origin.  This mass was present previously but now causes more mass effect, with new narrowing of the right mainstem bronchus.  Malignancy is not excluded.  Recommend nonemergent outpatient thyroid ultrasound.  4. Artifacts make it difficult to exclude segmental thromboembolism in the right lower lobe.  Correlate clinically.  Consider follow-up chest CTA if there is strong clinical suspicion for thromboembolism.  Additional findings as detailed above.  This report was flagged in Epic as abnormal.    Electronically signed by resident: Charlotte Sinha  Date:    01/30/2024  Time:    02:38    Electronically signed by: Patricio Rea  Date:    01/30/2024  Time:    04:17      Labs, Imaging, EKG and Diagnostic results from 1/31/2024 were reviewed.    Medications:  Medication list was reviewed and changes noted under Assessment/Plan.  No current facility-administered medications on file prior to encounter.     Current Outpatient Medications on File Prior to Encounter   Medication Sig Dispense Refill    albuterol-ipratropium (DUO-NEB) 2.5 mg-0.5 mg/3 mL nebulizer solution Take 3 mLs by nebulization 2 (two) times daily as needed for Shortness of Breath. Rescue 75 mL 2    amoxicillin-clavulanate 875-125mg (AUGMENTIN) 875-125 mg per tablet Take 1 tablet by mouth 2 (two) times daily.      apixaban (ELIQUIS) 5 mg Tab Take 1 tablet (5 mg total) by mouth 2 (two) times daily. 180 tablet 3    atorvastatin (LIPITOR) 40 MG tablet Take 1 tablet (40 mg total) by mouth every evening. 90 tablet 3    baclofen (LIORESAL) 5 mg Tab tablet Take 1 tablet (5 mg total) by mouth 3 (three) times daily.       cholecalciferol, vitamin D3, 125 mcg (5,000 unit) capsule Take 1 capsule (5,000 Units total) by mouth once daily. 90 capsule 3    famotidine (PEPCID) 20 MG tablet Take 20 mg by mouth.      ferrous sulfate, dried (SLOW FE) 160 mg (50 mg iron) TbSR Take 1 tablet (160 mg total) by mouth every other day.      fluticasone-salmeterol diskus inhaler 100-50 mcg Inhale 1 puff into the lungs 2 (two) times daily. Controller 1 each 2    gabapentin (NEURONTIN) 100 MG capsule Take 1 capsule (100 mg total) by mouth 3 (three) times daily. 60 capsule 6    gabapentin 5% baclofen 2% amitriptyline 2% topical cream Apply topically 3 (three) times daily. Apply to shoulders and neck for torticollis 240 g 2    hydroxychloroquine (PLAQUENIL) 200 mg tablet Take 1 tablet (200 mg total) by mouth once daily. 90 tablet 3    LIDOcaine (LIDODERM) 5 % Place 1 patch onto the skin once daily. Remove & Discard patch within 12 hours as needed for pain or as directed by MD Place patch onto lower back as needed 60 patch 11    magnesium oxide (MAG-OX) 400 mg (241.3 mg magnesium) tablet Take 400 mg by mouth once daily.      mycophenolate (CELLCEPT) 500 mg Tab Take 1 tablet (500 mg total) by mouth 2 (two) times daily. 180 tablet 3    pantoprazole (PROTONIX) 40 MG tablet Take 1 tablet (40 mg total) by mouth once daily. 30 tablet 5    predniSONE (DELTASONE) 10 MG tablet Take 10 mg by mouth once daily.      QUEtiapine (SEROQUEL) 25 MG Tab Take 1 tablet (25 mg total) by mouth every evening. Patient may request 1/2 tablet rather than full 25mg nightly. 30 tablet 11    sulfamethoxazole-trimethoprim 800-160mg (BACTRIM DS) 800-160 mg Tab One tablet by mouth every Monday, Wednesday, Friday to prevent lung infection 36 tablet 3    thiamine 100 MG tablet Take 100 mg by mouth once daily.      [DISCONTINUED] calcium carbonate (TUMS) 200 mg calcium (500 mg) chewable tablet Take 2 tablets (1,000 mg total) by mouth once daily. (Patient not taking: No sig reported) 60  tablet 11    [DISCONTINUED] lacosamide (VIMPAT) 10 mg/mL Soln oral solution Take 10 mLs (100 mg total) by mouth every 12 (twelve) hours. (Patient not taking: No sig reported) 600 mL 11     Scheduled Medications:  atorvastatin, 40 mg, Oral, QHS  ceFEPime IV (PEDS and ADULTS), 2 g, Intravenous, Q12H  fluticasone furoate-vilanteroL, 1 puff, Inhalation, Daily  gabapentin, 100 mg, Oral, TID  hydroxychloroquine, 200 mg, Oral, Daily  pantoprazole, 40 mg, Oral, Daily  predniSONE, 10 mg, Oral, Daily  thiamine, 100 mg, Oral, Daily  vancomycin (VANCOCIN) IV (PEDS and ADULTS), 1,250 mg, Intravenous, Q24H      PRN: acetaminophen, acetaminophen, albuterol-ipratropium, aluminum-magnesium hydroxide-simethicone, heparin (PORCINE), heparin (PORCINE), melatonin, naloxone, ondansetron, polyethylene glycol, QUEtiapine, simethicone, sodium chloride 0.9%, Pharmacy to dose Vancomycin consult **AND** vancomycin - pharmacy to dose  Infusions:    heparin (porcine) in D5W 12 Units/kg/hr (01/31/24 1106)     Estimated Creatinine Clearance: 43.6 mL/min (based on SCr of 0.9 mg/dL).           Assessment/Plan:      * Sepsis due to pneumonia  Meets sepsis criteria likely secondary to pneumonia.  Initial lactic acidosis improving with IV fluids.  Blood cultures collected; we will continue vancomycin and cefepime for now given concern for focal pneumonia, and we will follow.  Tailor antibiotics as appropriate.  Obtain CT chest as below.    This patient does have evidence of infective focus  My overall impression is sepsis.  Source: Respiratory  Antibiotics given-   Antibiotics (72h ago, onward)      Start     Stop Route Frequency Ordered    01/30/24 2200  vancomycin 1,250 mg in dextrose 5 % (D5W) 250 mL IVPB (Vial-Mate)         -- IV Every 24 hours (non-standard times) 01/29/24 2245    01/30/24 0600  ceFEPIme (MAXIPIME) 2 g in dextrose 5 % in water (D5W) 100 mL IVPB (MB+)         -- IV Every 12 hours (non-standard times) 01/29/24 2235    01/29/24 2335   vancomycin - pharmacy to dose  (vancomycin IVPB (PEDS and ADULTS))        See Hyperspace for full Linked Orders Report.    -- IV pharmacy to manage frequency 01/29/24 2235          Latest lactate reviewed-  Recent Labs   Lab 01/29/24 2157   POCLAC 1.54       Organ dysfunction indicated by Acute respiratory failure    Fluid challenge Not needed - patient is not hypotensive      Post- resuscitation assessment No - Post resuscitation assessment not needed       Will Not start Pressors- Levophed for MAP of 65    Cough with hemoptysis  1/31Eliquis on hold. Heparin drip not started overnight due to concern for coughing up blood. Hb at 7.6. PTT elevated at baseline 37. sats 96% on 3LNC      Pleural effusion  Large pleural effusion noted on chest x-ray, which may be secondary to pneumonia.  No overt evidence of volume overload to suggest heart failure.  We will attempt to obtain CT chest for better characterization; may require thoracentesis.  Continue antibiotics.  1/30   Recently diagnosed with pneumonia last week and started on oral antibiotics along with oxygen.  sats 93% on 2LNC. pneumonia vs  chronic interstitial lung disease from rheumatoid arthritis.   Large pleural effusion noted on chest x-ray, CT chest -  Moderate volume right-sided pleural effusion with adjacent compressive atelectasis.  Patchy ground-glass opacities in the aerated lungs suggestive of underlying infectious/inflammatory process. Numerous new nodular opacities scattered within the aerated portions of the lungs, with leading differential diagnostic considerations of infectious/inflammatory process versus malignancy. .  Enlarged multinodular thyroid gland with complex hypoattenuating substernal mass thought to be of thyroid origin.  This mass was present previously but now causes more mass effect, with new narrowing of the right mainstem bronchus.  Malignancy is not excluded. Pulmonology consulted, continue cefepime and vancomycin. IR consulted for  diagnostic thoracentesis .    1/31 Eliquis on hold. Heparin drip not started overnight due to concerns for bloody stains on gown. Per staff no witnessed hemoptysis. Hb at 7.6-->8.3 . PTT elevated at baseline 37. started on heparin drip. sats 96% on 3LNC. CBC q 6h.    sputum cultures. pulmonology f/u .    Anemia    Patient's with Normocytic anemia.. Hemoglobin stable. Etiology likely due to chronic disease .  Current CBC reviewed-    Recent Labs   Lab 01/30/24  1351 01/30/24  1942 01/31/24  0231   HGB 8.4* 8.0* 7.7*  7.7*           Component Value Date/Time    MCV 79 (L) 01/31/2024 0231    MCV 79 (L) 01/31/2024 0231    RDW 18.2 (H) 01/31/2024 0231    RDW 18.2 (H) 01/31/2024 0231    IRON 14 (L) 10/13/2022 0349    FERRITIN 248 10/13/2022 0349    FOLATE 5.2 09/02/2023 1015    [id number] 1328 (H) 09/02/2023 1015     Monitor CBC and transfuse if H/H drops below 7/21.    1/31Eliquis on hold. Heparin drip not started overnight due to concern for coughing up blood. Hb at 7.6. PTT elevated at baseline 37. sats 96% on 3LNC    COPD without exacerbation  Currently without evidence of acute hypercapnic respiratory failure, and no wheezing on exam.  We will continue home inhalers.  1/30  sats 93% on 2LNC.    Heart failure with preserved ejection fraction  No evidence of acute exacerbation.  We will monitor volume status.  Patient admitted with signs and symptoms of CHF exacerbation. Continue with IV lasix.    Results for orders placed or performed during the hospital encounter of 01/29/24   Brain natriuretic peptide   Result Value Ref Range     (H) 0 - 99 pg/mL    serial troponins.  Cardiac Enzymes trend  Recent Labs   Lab 01/29/24  1836   TROPONINI 0.018     Results for orders placed during the hospital encounter of 10/09/23    Echo    Interpretation Summary    Left Ventricle: The left ventricle is normal in size. Ventricular mass is normal. Normal wall thickness. There is concentric remodeling. Normal wall motion. There  is normal systolic function. Ejection fraction by visual approximation is 65%. Grade II diastolic dysfunction.    Left Atrium: Left atrium is severely dilated.    Right Ventricle: Normal right ventricular cavity size. Wall thickness is normal. Right ventricle wall motion  is normal. Systolic function is normal.    Aortic Valve: The aortic valve is a trileaflet valve. There is moderate aortic valve sclerosis. There is mild annular calcification present. There is restricted motion of the left coronary cusp.    Mitral Valve: There is bileaflet sclerosis. Moderately calcified subvalvular apparatus. Mildly restricted motion. There is mild to moderate regurgitation.    Tricuspid Valve: There is mild regurgitation.    Pulmonary Artery: The estimated pulmonary artery systolic pressure is 42 mmHg.    IVC/SVC: Normal venous pressure at 3 mmHg.         Vascular Dementia  Per daughter at bedside, recently with worsening dementia with waxing and waning mental status.  We will continue home Seroquel nightly along with delirium precautions.  1/30 CT head -No acute intracranial process.  Significant motion artifact may diminish the sensitivity. Involutional changes with chronic microvascular ischemic changes.  Small remote lacunar infarct of the right cerebellum and right corona radiata.    Immunocompromised state due to drug therapy  1/30 continue plaquenil.  Hold home CellCept .       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  Continue supportive care and frequent turns.      Multinodular goiter  1/30  CT chest - .  Enlarged multinodular thyroid gland with complex hypoattenuating substernal mass thought to be of thyroid origin.  This mass was present previously but now causes more mass effect, with new narrowing of the right mainstem bronchus.  Malignancy is not excluded. Pulmonology consulted, TSH WNL .  Goiter continues to enlarge and may one day  compromise her airway. palliative care consulted for discussion with daughters.  1/31   Endocrinology consulted for multinodular goiter.  Discussed with the daughter at the bedside.      UTI (urinary tract infection)  1/30 UA + . UC pending. continue cefepime  1/31 UC - GNR. Identification and susceptibility pending.       Thrombocytopenia  Patient was found to have thrombocytopenia, monitor .  Recent Labs   Lab 01/29/24  1836   *         AF (paroxysmal atrial fibrillation)  Currently rate controlled.   1/31 Eliquis on hold. Heparin drip not started overnight due to concerns for bloody stains on gown. Per staff no witnessed hemoptysis. Hb at 7.6-->8.3 . PTT elevated at baseline 37. started on heparin drip.     Oropharyngeal dysphagia  Noted in the past, however not on specific dysphagia diet per daughter.  Her right sided pneumonia may be secondary to aspiration as she is at high risk for this.  May consider speech therapy consultation.  1/30 SLP consulted to rule out aspiration        Acute respiratory failure with hypoxia  Presents from her [facility] center with increased tachypnea and hypoxia.  Recently diagnosed with pneumonia last week and started on oral antibiotics along with oxygen.  PO2 is decreased on ABG, however appears to be chronically decreased.  Suspect pneumonia on chronic interstitial lung disease from rheumatoid arthritis.  We will continue oxygen to maintain sats 88% or greater.  Low suspicion for PE given chronic Eliquis use.  1/30  Recently diagnosed with pneumonia last week and started on oral antibiotics along with oxygen.  sats 93% on 2LNC. pneumonia vs  chronic interstitial lung disease from rheumatoid arthritis.   Large pleural effusion noted on chest x-ray, CT chest -  Moderate volume right-sided pleural effusion with adjacent compressive atelectasis.     Rheumatoid arthritis of multiple sites  Continue home Plaquenil and prednisone.  Hold home CellCept given acute  infection.        VTE Risk Mitigation (From admission, onward)           Ordered     heparin 25,000 units in dextrose 5% (100 units/ml) IV bolus from bag - ADDITIONAL PRN BOLUS - 60 units/kg  As needed (PRN)        Question:  Heparin Infusion Adjustment (DO NOT MODIFY ANSWER)  Answer:  [email or web address]\epic\Images\Pharmacy\HeparinInfusions\heparin LOW INTENSITY nomogram for [facility] [id number]    01/30/24 1844     heparin 25,000 units in dextrose 5% (100 units/ml) IV bolus from bag - ADDITIONAL PRN BOLUS - 30 units/kg  As needed (PRN)        Question:  Heparin Infusion Adjustment (DO NOT MODIFY ANSWER)  Answer:  [email or web address]\epic\Images\Pharmacy\HeparinInfusions\heparin LOW INTENSITY nomogram for [facility] [id number].pdf    01/30/24 1844     heparin 25,000 units in dextrose 5% 250 mL (100 units/mL) infusion LOW INTENSITY nomogram - [facility]  Continuous        Question:  Begin at (units/kg/hr)  Answer:  12    01/30/24 1844                    Discharge Planning   LETICIA: 2/2/2024     Code Status: Full Code   Is the patient medically ready for discharge?: (No Documentation)    Reason for patient still in hospital (select all that apply): Treatment                     Tim Castillo MD  Department of Hospital Medicine   [facility] [address] [facility] - Emergency Dept

## 2024-01-31 NOTE — CONSULTS
IR consulted for a thoracentesis. Ordering team to place order for IR thoracentesis and any labs/micro for send off.     Melita Bedolla PA-C  Interventional Radiology  Spectra: [id number]  1/31/2024

## 2024-01-31 NOTE — SUBJECTIVE & OBJECTIVE
Past Medical History:   Diagnosis Date    Acute cystitis without hematuria 2/27/2020    Acute hypoxemic respiratory failure 4/11/2018    Acute respiratory failure with hypoxia 3/2/2020    KERMIT (acute kidney injury) 3/13/2019    Altered mental status     Anemia     Arthritis     Bilateral hand pain 3/14/2018    Branch retinal vein occlusion, left eye 2/20/2015    Chronic bilateral low back pain without sciatica 3/23/2017    Chronic renal failure in pediatric patient, stage 3 (moderate) 4/15/2018    Chronic renal failure syndrome, stage 3 (moderate) 4/15/2018    Chronic systolic (congestive) heart failure 8/6/2021    Last Assessment & Plan:  Formatting of this note might be different from the original. 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 Hospitalization late 2021, w/ rehab to follow (no notes available) --12/13/2021-CT head revealed thin extra-axial hyperdense collection overlying the right cerebral convexity compatible with acute subdural hematoma, neurosurgery was consulted, patient was noted with Keppra 500 mg b.i.d., apixaban was held -12/19/    Iron deficiency anemia secondary to inadequate dietary iron intake 6/24/2013    Joint pain      Keratoconjunctivitis sicca of both eyes not specified as Sjogren's 7/29/2016    Leiomyoma of uterus, unspecified 9/16/2014    Long QT interval 6/29/2016    Due to medication (plaquenil)     Macular edema 1/12/2015    Multinodular goiter 1/12/2016    Neuropathy 8/23/2017    Plaquenil causing adverse effect in therapeutic use 10/7/2016    Pneumococcal vaccine refused 8/17/2016    Pneumonia due to Streptococcus pneumoniae 4/5/2018    Poor nutrition 12/23/2018    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/2015    RA (rheumatoid arthritis) 12/13/2021    -managed by rhematology, since this is a duplicate problem list entry, will archive this     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       Past Surgical History:   Procedure Laterality Date    BREAST CYST EXCISION      CATARACT EXTRACTION      COLONOSCOPY N/A 9/29/2015    Procedure: COLONOSCOPY;  Surgeon: FIDELINA Sanchez MD;  [facility]  Service: Endoscopy;  Laterality: N/A;    EYE SURGERY      INJECTION OF ANESTHETIC AGENT AROUND NERVE Left 4/19/2021    Procedure: BLOCK, NERVE, FEMORAL AND OBTURATOR;  Surgeon: Shivam Gonzalez MD;  Location[place] [facility] [place] operated by [facility];  Service: Pain Management;  Laterality: Left;  consent needed    JOINT REPLACEMENT      right knee    KNEE SURGERY Left 12/31/2013    TKR    left parotidectomy      mixed tumor    MS EVAL,SWALLOW FUNCTION,CINE/VIDEO RECORD  6/5/2021         SALIVARY GLAND SURGERY      TONSILLECTOMY      UPPER GASTROINTESTINAL ENDOSCOPY         Review of patient's allergies indicates:  No Known Allergies    Medications:  Continuous Infusions:   heparin (porcine)  in D5W 12 Units/kg/hr (01/31/24 1106)     Scheduled Meds:   atorvastatin  40 mg Oral QHS    ceFEPime IV (PEDS and ADULTS)  2 g Intravenous Q12H    fluticasone furoate-vilanteroL  1 puff Inhalation Daily    gabapentin  100 mg Oral TID    hydroxychloroquine  200 mg Oral Daily    pantoprazole  40 mg Oral Daily    predniSONE  10 mg Oral Daily    thiamine  100 mg Oral Daily    vancomycin (VANCOCIN) IV (PEDS and ADULTS)  1,250 mg Intravenous Q24H     PRN Meds:acetaminophen, acetaminophen, albuterol-ipratropium, aluminum-magnesium hydroxide-simethicone, heparin (PORCINE), heparin (PORCINE), melatonin, naloxone, ondansetron, polyethylene glycol, QUEtiapine, simethicone, sodium chloride 0.9%, Pharmacy to dose Vancomycin consult **AND** vancomycin - pharmacy to dose    Family History       Problem Relation (Age of Onset)    Breast cancer Maternal Grandmother    Cancer Father    Heart disease Mother    Hypertension Mother    Hyperthyroidism Mother, Other    Prostate cancer Father          Tobacco Use    Smoking status: Never     Passive exposure: Never    Smokeless tobacco: Never   Substance and Sexual Activity    Alcohol use: Yes     Alcohol/week: 0.0 standard drinks of alcohol     Comment: very seldom     Drug use: No    Sexual activity: Not Currently     Comment: ,  age 52,        Review of Systems   Constitutional:  Positive for appetite change.   Respiratory:  Positive for cough.      Objective:     Vital Signs (Most Recent):  Temp: 97.8 °F (36.6 °C) (01/31/24 0756)  Pulse: 96 (01/31/24 1107)  Resp: 20 (01/31/24 0756)  BP: (!) 121/55 (01/31/24 0756)  SpO2: 95 % (01/31/24 1002) Vital Signs (24h Range):  Temp:  [97.8 °F (36.6 °C)-98.2 °F (36.8 °C)] 97.8 °F (36.6 °C)  Pulse:  [] 96  Resp:  [17-20] 20  SpO2:  [90 %-98 %] 95 %  BP: (103-131)/(51-55) 121/55     Weight: 71.7 kg (158 lb)  Body mass index is 27.12 kg/m².       Physical Exam  Constitutional:       Appearance: She is ill-appearing.   Neurological:       Mental Status: She is disoriented.            Review of Symptoms      Symptom Assessment (ESAS 0-10 Scale)  Unable to complete assessment due to Dementia         Pain Assessment in Advanced Demential Scale (PAINAD)   Breathing - Independent of vocalization:  0  Negative vocalization:  0  Facial expression:  0  Body language:  0  Consolability:  0  Total:  0    Living Arrangements:  Lives in home    Psychosocial/Cultural:   See Palliative Psychosocial Note: Yes  Ms. Lux resides in [facility] and has 2 daughters active in her care.   **Primary  to Follow**  Palliative Care  Consult: Yes        Advance Care Planning   Advance Directives:   Living Will: Yes        Copy on chart: Yes      Decision Making:  Family answered questions  Goals of Care: What is most important right now is to focus on extending life as long as possible, even it it means sacrificing quality, curative/life-prolongation (regardless of treatment burdens). Accordingly, we have decided that the best plan to meet the patient's goals includes continuing with treatment.         Significant Labs: BMP:   Recent Labs   Lab 01/31/24 0231   GLU 73   *   K 3.5      CO2 19*   BUN 17   CREATININE 0.9   CALCIUM 8.5*   MG 1.6     CBC:   Recent Labs   Lab 01/30/24  1942 01/31/24  0231 01/31/24  0740   WBC 10.78 12.65  12.65 14.88*   HGB 8.0* 7.7*  7.7* 8.3*   HCT 26.1* 25.3*  25.3* 26.9*   * 109*  109* 104*     CBC:   Recent Labs   Lab 01/31/24  0740   WBC 14.88*   HGB 8.3*   HCT 26.9*   MCV 81*   *     BMP:  Recent Labs   Lab 01/31/24 0231   GLU 73   *   K 3.5      CO2 19*   BUN 17   CREATININE 0.9   CALCIUM 8.5*   MG 1.6     LFT:  Lab Results   Component Value Date    AST 10 01/31/2024    ALKPHOS 53 (L) 01/31/2024    BILITOT 1.1 (H) 01/31/2024     Albumin:   Albumin   Date Value Ref Range Status   01/31/2024 2.5 (L) 3.5 - 5.2 g/dL Final     Protein:   Total Protein   Date Value Ref  Range Status   01/31/2024 5.2 (L) 6.0 - 8.4 g/dL Final     Lactic acid:   Lab Results   Component Value Date    LACTATE 1.2 01/30/2024    LACTATE 1.2 09/03/2023       Significant Imaging: I have reviewed all pertinent imaging results/findings within the past 24 hours.

## 2024-01-31 NOTE — ASSESSMENT & PLAN NOTE
Large pleural effusion noted on chest x-ray, which may be secondary to pneumonia.  No overt evidence of volume overload to suggest heart failure.  We will attempt to obtain CT chest for better characterization; may require thoracentesis.  Continue antibiotics.  1/30   Recently diagnosed with pneumonia last week and started on oral antibiotics along with oxygen.  sats 93% on 2LNC. pneumonia vs  chronic interstitial lung disease from rheumatoid arthritis.   Large pleural effusion noted on chest x-ray, CT chest -  Moderate volume right-sided pleural effusion with adjacent compressive atelectasis.  Patchy ground-glass opacities in the aerated lungs suggestive of underlying infectious/inflammatory process. Numerous new nodular opacities scattered within the aerated portions of the lungs, with leading differential diagnostic considerations of infectious/inflammatory process versus malignancy. .  Enlarged multinodular thyroid gland with complex hypoattenuating substernal mass thought to be of thyroid origin.  This mass was present previously but now causes more mass effect, with new narrowing of the right mainstem bronchus.  Malignancy is not excluded. Pulmonology consulted, continue cefepime and vancomycin. IR consulted for diagnostic thoracentesis .    1/31 Eliquis on hold. Heparin drip not started overnight due to concerns for bloody stains on gown. Per staff no witnessed hemoptysis. Hb at 7.6-->8.3 . PTT elevated at baseline 37. started on heparin drip. sats 96% on 3LNC. CBC q 6h.    sputum cultures. pulmonology f/u .

## 2024-01-31 NOTE — PROGRESS NOTES
I have reviewed and concur with Dr. Porfirio Majano's history, physical, assessment, and plan.  I have personally interviewed and examined the patient.      Manfred Umanzor MD

## 2024-01-31 NOTE — SUBJECTIVE & OBJECTIVE
"Interval HPI:   Overnight events:  Some confusion at bedside but possibly baseline.  IS aware of her thyroid history.  Remains short of breath with some coughing and still on nasal canula.  Awaiting evaluation for thoracentesis.      PMH, PSH, FH, SH updated and reviewed     ROS:  As above    Labs Reviewed and Include:  Lab Results   Component Value Date    WBC 14.54 (H) 01/31/2024    HGB 8.4 (L) 01/31/2024    HCT 27.2 (L) 01/31/2024    MCV 81 (L) 01/31/2024     (L) 01/31/2024       Recent Labs   Lab 01/31/24  0231   GLU 73   CALCIUM 8.5*   ALBUMIN 2.5*   PROT 5.2*   *   K 3.5   CO2 19*      BUN 17   CREATININE 0.9   ALKPHOS 53*   ALT 7*   AST 10   BILITOT 1.1*     Lab Results   Component Value Date    HGBA1C 5.3 06/20/2022       Nutritional status:   Body mass index is 27.12 kg/m².  Lab Results   Component Value Date    ALBUMIN 2.5 (L) 01/31/2024    ALBUMIN 2.6 (L) 01/30/2024    ALBUMIN 2.9 (L) 01/29/2024     No results found for: "PREALBUMIN"    Estimated Creatinine Clearance: 43.6 mL/min (based on SCr of 0.9 mg/dL).      PHYSICAL EXAMINATION:  Vitals:    01/31/24 1635   BP:    Pulse: 98   Resp:    Temp:      Body mass index is 27.12 kg/m².     Physical Exam  Vitals reviewed.   Eyes:      Extraocular Movements: Extraocular movements intact.   Cardiovascular:      Rate and Rhythm: Tachycardia present.   Pulmonary:      Comments: Decrease in breath sounds on right  Neurological:      Mental Status: She is alert.   Psychiatric:         Mood and Affect: Mood normal.         Behavior: Behavior normal.          "

## 2024-01-31 NOTE — ASSESSMENT & PLAN NOTE
Plans in discussion for thoracentesis with fluid studies  Large component of current respiratory status and symptoms

## 2024-01-31 NOTE — ED NOTES
Telemetry Verification   Patient placed on Telemetry Box  Verified with War Room  Box # [id number]   Monitor Tech Brianda   Rate 89   Rhythm NS

## 2024-01-31 NOTE — CONSULTS
[facility] - [facility] ([place] [id number])  Endocrinology  Consult Note    Consult Requested by: Tim Castillo MD   Reason for admit: Sepsis due to pneumonia    HISTORY OF PRESENT ILLNESS:  Selma Alonzo Lux (Dr. Lux) is a 88 year old female who presented to the ER from her detention home with hypoxia, tachypnea and tachycardia.  Medical history includes COPD, CKD stage 3, chronic left-sided weakness due to history of stroke, adrenal insufficiency (on pred 10), rheumatoid arthritis (immunocompromised on Plaquenil/mycophenolate), and chronic CHF/PHTN.  She had a recent admission for generalized weakness in October 2023.  She had been on antibiotics for recent pneumonia.  Imaging completed on admission showed concerns for pleural effusion/pneumonia of the right lung.  In addition she had findings of a thyroid goiter with known substernal extension and partial compression and mass effect on her trachea.  Palliative care evaluated the patient and spoke with family who apparently did request a consult to endocrinology among other specialists.  Endocrinology was consulted for the thyroid/chest mass.      On evaluation at bedside Dr. Lux is able to provide some limited history but her care taker is at bedside as well helping.  She is aware of the enlarged thyroid goiter with extension into her substernal region and claims this has been present for many years.  She is not on any thyroid medication and labs for thyroid function have been normal.  She has been followed outpatient with endocrine, most recently Dr. Monroy.  She has a history of thyroid nodule biopsies in 2020 x 2 which were both benign.  Her chest imaging over the last 3-4 years have continued to show this known thyroid enlargement with substernal extension with no significant change on the amount of mass effect noted.         Lab Results   Component Value Date    TSH 1.875 01/29/2024    L5TJWPO 5.8 08/29/2019    FREET4 1.01 05/06/2022         CT Imaging  of the Chest: 1/29/2024  Base of Neck: Enlarged multinodular thyroid gland with a large heterogeneously hypoattenuating substernal component that exerts mass effect upon and causes mild narrowing of the trachea and right mainstem bronchus.  This lesion contains internal calcifications and measures approximately 5.7 x 4.9 cm in axial dimension (series 2, image 38), and approximately 5.8 cm in craniocaudal extent.         Medications and/or Treatments Impacting Glycemic Control:  Immunotherapy:    Immunosuppressants       None          Steroids:   Hormones (From admission, onward)      Start     Stop Route Frequency Ordered    01/30/24 0900  predniSONE tablet 10 mg         -- Oral Daily 01/29/24 2245    01/30/24 0045  melatonin tablet 6 mg         -- Oral Nightly PRN 01/29/24 2345          Pressors:    Autonomic Drugs (From admission, onward)      None            Medications Prior to Admission   Medication Sig Dispense Refill Last Dose    albuterol-ipratropium (DUO-NEB) 2.5 mg-0.5 mg/3 mL nebulizer solution Take 3 mLs by nebulization 2 (two) times daily as needed for Shortness of Breath. Rescue 75 mL 2     amoxicillin-clavulanate 875-125mg (AUGMENTIN) 875-125 mg per tablet Take 1 tablet by mouth 2 (two) times daily.       apixaban (ELIQUIS) 5 mg Tab Take 1 tablet (5 mg total) by mouth 2 (two) times daily. 180 tablet 3     atorvastatin (LIPITOR) 40 MG tablet Take 1 tablet (40 mg total) by mouth every evening. 90 tablet 3     baclofen (LIORESAL) 5 mg Tab tablet Take 1 tablet (5 mg total) by mouth 3 (three) times daily.       cholecalciferol, vitamin D3, 125 mcg (5,000 unit) capsule Take 1 capsule (5,000 Units total) by mouth once daily. 90 capsule 3     famotidine (PEPCID) 20 MG tablet Take 20 mg by mouth.       ferrous sulfate, dried (SLOW FE) 160 mg (50 mg iron) TbSR Take 1 tablet (160 mg total) by mouth every other day.       fluticasone-salmeterol diskus inhaler 100-50 mcg Inhale 1 puff into the lungs 2 (two) times  daily. Controller 1 each 2     gabapentin (NEURONTIN) 100 MG capsule Take 1 capsule (100 mg total) by mouth 3 (three) times daily. 60 capsule 6     gabapentin 5% baclofen 2% amitriptyline 2% topical cream Apply topically 3 (three) times daily. Apply to shoulders and neck for torticollis 240 g 2     hydroxychloroquine (PLAQUENIL) 200 mg tablet Take 1 tablet (200 mg total) by mouth once daily. 90 tablet 3     LIDOcaine (LIDODERM) 5 % Place 1 patch onto the skin once daily. Remove & Discard patch within 12 hours as needed for pain or as directed by MD Place patch onto lower back as needed 60 patch 11     magnesium oxide (MAG-OX) 400 mg (241.3 mg magnesium) tablet Take 400 mg by mouth once daily.       mycophenolate (CELLCEPT) 500 mg Tab Take 1 tablet (500 mg total) by mouth 2 (two) times daily. 180 tablet 3     pantoprazole (PROTONIX) 40 MG tablet Take 1 tablet (40 mg total) by mouth once daily. 30 tablet 5     predniSONE (DELTASONE) 10 MG tablet Take 10 mg by mouth once daily.       QUEtiapine (SEROQUEL) 25 MG Tab Take 1 tablet (25 mg total) by mouth every evening. Patient may request 1/2 tablet rather than full 25mg nightly. 30 tablet 11     sulfamethoxazole-trimethoprim 800-160mg (BACTRIM DS) 800-160 mg Tab One tablet by mouth every Monday, Wednesday, Friday to prevent lung infection 36 tablet 3     thiamine 100 MG tablet Take 100 mg by mouth once daily.          Current Facility-Administered Medications   Medication Dose Route Frequency Provider Last Rate Last Admin    acetaminophen tablet 650 mg  650 mg Oral Q8H PRN Osvaldo Ponce MD        acetaminophen tablet 650 mg  650 mg Oral Q4H PRN Osvaldo Ponce MD        albuterol-ipratropium 2.5 mg-0.5 mg/3 mL nebulizer solution 3 mL  3 mL Nebulization BID PRN Osvaldo Ponce MD        aluminum-magnesium hydroxide-simethicone 200-200-20 mg/5 mL suspension 30 mL  30 mL Oral QID PRN Osvaldo Ponce MD        atorvastatin tablet 40 mg   40 mg Oral QHS Osvaldo Ponce MD   40 mg at 01/30/24 2254    ceFEPIme (MAXIPIME) 2 g in dextrose 5 % in water (D5W) 100 mL IVPB (MB+)  2 g Intravenous Q12H Osvaldo Ponce  mL/hr at 01/31/24 1712 2 g at 01/31/24 1712    fluticasone furoate-vilanteroL 100-25 mcg/dose diskus inhaler 1 puff  1 puff Inhalation Daily Osvaldo Ponce MD   1 puff at 01/31/24 1243    gabapentin capsule 100 mg  100 mg Oral TID Osvaldo Ponce MD   100 mg at 01/31/24 1617    guaiFENesin 100 mg/5 ml syrup 200 mg  200 mg Oral Q4H While awake Tim Castillo MD   200 mg at 01/31/24 1617    heparin 25,000 units in dextrose 5% (100 units/ml) IV bolus from bag - ADDITIONAL PRN BOLUS - 30 units/kg  30 Units/kg (Adjusted) Intravenous PRN Tim Castillo MD        heparin 25,000 units in dextrose 5% (100 units/ml) IV bolus from bag - ADDITIONAL PRN BOLUS - 60 units/kg  60 Units/kg (Adjusted) Intravenous PRN Tim Csatillo MD        heparin 25,000 units in dextrose 5% 250 mL (100 units/mL) infusion LOW INTENSITY nomogram - [facility]  0-40 Units/kg/hr (Adjusted) Intravenous Continuous Tim Castillo MD 7.4 mL/hr at 01/31/24 1106 12 Units/kg/hr at 01/31/24 1106    hydroxychloroquine tablet 200 mg  200 mg Oral Daily Osvaldo Ponce MD   200 mg at 01/31/24 1051    melatonin tablet 6 mg  6 mg Oral Nightly PRN Osvaldo Ponce MD        naloxone 0.4 mg/mL injection 0.02 mg  0.02 mg Intravenous PRN Osvaldo Ponce MD        ondansetron disintegrating tablet 8 mg  8 mg Oral Q8H PRN Osvaldo Ponce MD        pantoprazole EC tablet 40 mg  40 mg Oral Daily Osvaldo Ponce MD   40 mg at 01/31/24 1051    polyethylene glycol packet 17 g  17 g Oral Daily PRN Osvaldo Ponce MD        predniSONE tablet 10 mg  10 mg Oral Daily Osvaldo Ponce MD   10 mg at 01/31/24 1051    QUEtiapine tablet 25 mg  25 mg Oral Nightly PRN Osvaldo Ponce MD   34  "mg at 01/30/24 0007    simethicone chewable tablet 80 mg  1 tablet Oral QID PRN Osvaldo Ponce MD        sodium chloride 0.9% flush 1-10 mL  1-10 mL Intravenous Q12H PRN Osvaldo Ponce MD        thiamine tablet 100 mg  100 mg Oral Daily Osvaldo Ponce MD   100 mg at 01/31/24 1051    vancomycin - pharmacy to dose   Intravenous pharmacy to manage frequency Osvaldo Ponce MD        vancomycin 1,250 mg in dextrose 5 % (D5W) 250 mL IVPB (Vial-Mate)  1,250 mg Intravenous Q24H Keenan Sosa MD   Stopped at 01/31/24 0029       Interval HPI:   Overnight events:  Some confusion at bedside but possibly baseline.  IS aware of her thyroid history.  Remains short of breath with some coughing and still on nasal canula.  Awaiting evaluation for thoracentesis.      PMH, PSH, FH, SH updated and reviewed     ROS:  As above    Labs Reviewed and Include:  Lab Results   Component Value Date    WBC 14.54 (H) 01/31/2024    HGB 8.4 (L) 01/31/2024    HCT 27.2 (L) 01/31/2024    MCV 81 (L) 01/31/2024     (L) 01/31/2024       Recent Labs   Lab 01/31/24  0231   GLU 73   CALCIUM 8.5*   ALBUMIN 2.5*   PROT 5.2*   *   K 3.5   CO2 19*      BUN 17   CREATININE 0.9   ALKPHOS 53*   ALT 7*   AST 10   BILITOT 1.1*     Lab Results   Component Value Date    HGBA1C 5.3 06/20/2022       Nutritional status:   Body mass index is 27.12 kg/m².  Lab Results   Component Value Date    ALBUMIN 2.5 (L) 01/31/2024    ALBUMIN 2.6 (L) 01/30/2024    ALBUMIN 2.9 (L) 01/29/2024     No results found for: "PREALBUMIN"    Estimated Creatinine Clearance: 43.6 mL/min (based on SCr of 0.9 mg/dL).      PHYSICAL EXAMINATION:  Vitals:    01/31/24 1635   BP:    Pulse: 98   Resp:    Temp:      Body mass index is 27.12 kg/m².     Physical Exam  Vitals reviewed.   Eyes:      Extraocular Movements: Extraocular movements intact.   Cardiovascular:      Rate and Rhythm: Tachycardia present.   Pulmonary:      Comments: " Decrease in breath sounds on right  Neurological:      Mental Status: She is alert.   Psychiatric:         Mood and Affect: Mood normal.         Behavior: Behavior normal.        .     ASSESSMENT and PLAN:    Pulmonary  Pleural effusion  Plans in discussion for thoracentesis with fluid studies  Large component of current respiratory status and symptoms        Acute respiratory failure with hypoxia  Secondary to pleural effusion and concern of pneumonia  Management per primary team    ID  * Sepsis due to pneumonia  Defer to primary team on management    Immunology/Multi System  Rheumatoid arthritis of multiple sites  Remains on chronic steroids, prednisone 10 mg daily.  With long term steroid dosing suspect there is a component of underlying adrenal insufficiency  If concerns for hypotension and/or fever then consider temporary stress dosing with steroids at double usual home dose until improvement then resumption of usual dosing.       Endocrine  Multinodular goiter  Known history of multinodular goiter with substernal extension  Substernal component of this thyroid tissue has been known for many years and has demonstrated stable size over time with continued component of mass effect on the trachea.  History of benign thyroid nodule biopsies in 2020    With no significant change in recent years there would be no specific suggestion of any intervention at this time.  Would await thoracentesis to see if respiratory status improves as he thyroid mass component has not caused respiratory compromise before despite the mass effect for multiple years    If respiratory status remains unchanged after thoracentesis and treatment of pneumonia and family has concerns for this thyroid goiter/mass then patient could possibly undergo further workup outpatient with endocrine surgery.  Given her thyroid has a large substernal component any surgical intervention would require an open procedure of the chest more than likely and given  her co-morbidities this is unlikely to be a feasible option.  If further discussion with surgery is desired then we would have to have involvement of endocrine surgery, Dr. Laureano, at that time.      For now as planned thoracentesis without further intervention on known substernal thyroid goiter component.  Thyroid studies also noted to be WNL.        Given stable size of this substernal thyroid goiter/mass and normal thyroid labs the endocrine service will sign off.  If further discussion is desired about surgery then endocrine surgery may be needed at that time, preferably in the office setting.  Reach out to endocrine if further questions or concerns arise.        Porfirio Majano, DO  Endocrinology

## 2024-01-31 NOTE — ASSESSMENT & PLAN NOTE
1/30 UA + . UC pending. continue cefepime  1/31 UC - GNR. Identification and susceptibility pending.

## 2024-01-31 NOTE — CONSULTS
[facility] ([place] [address])  Palliative Medicine  Consult Note    Patient Name: Selma Lux  MRN: [id number]  Admission Date: 1/29/2024  Hospital Length of Stay: 2 days  Code Status: Full Code   Attending Provider: Tim Castillo MD  Consulting Provider: DEBORAH Monique  Primary Care Physician: Génesis Rosario MD  Principal Problem:Sepsis due to pneumonia    Patient information was obtained from relative(s) and primary team.      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 Daughter states that at baseline, her mother has advancing dementia with waxing and waning mental status, which is unfortunately recently progressing. At bs today, pt is awake and oriented to person and place, answer simple questions, and on 3lpm NC.    Reason for Consult Per communication w/[facility] consult for assistance with GOC/ACP.    ACP/GOC I spoke with daughter (Madhavi) at length today. She has a very strong knowledge base about all of her mother's co-morbidities and the increasing complications managing them in the face of progressive dementia. She has been utilizing the Dementia clinic's resources for family support and is familiar with the progressive decline of dementia. She was amenable to speaking w/Palliaitve care, as someone in the clinic had recently suggested they speak w/palliative. She has noticed a decline in her mother over the last few months and is ready to have a meaningful GOC discussion. She states her mother is followed by multiple teams (rheumatology, pulm, primary, neuro, and  "endocrine) and she would like to make sure we are addressing all aspects of care in order to have meaningful GOC. Dr. Castillo at  today and discussed pending thoracentesis and concern of increased size of thyroid mass. I have messaged pt's primary care provider (Dr. Rosario) as per family's request to alert her of admission. Madhavi will speak to her sister and will arrange time for us all to meet tomorrow to further discuss GOC and code status.    [facility] 2 daughters Madhavi and Rosy who are decision makers.     Symptoms     Recommendations  -Pulmonary to continue following for recs, per family.  - Endocrine requested per family for recs  -Will meet tomorrow w/ sisters.           Thank you for your consult. I will follow-up with patient. Please contact us if you have any additional questions.    Subjective:     HPI:   Per chart review: "Selma Lux is a 88 y.o. female with RA with ILD on Cellcept and prednisone,  history of CVA with resultant vascular dementia, L hemiparesis, and debility/bedbound status, AFib, HTN, HFpEF who presents today with hypoxia.      History is provided by her daughter at bedside as she is unable to provide due to her dementia.      She states that at baseline, her mother has advancing dementia with waxing and waning mental status, which is unfortunately recently progressing. She is bedbound and favors lying to her R side given her L hemiparesis. Last week, she developed congestion and mucous production, so her PCP obtained a CXR which showed PNA. She has been on antibiotics since then, and was placed on oxygen as well.  Today, she was taken to a Rheumatology appointment, and her daughter noted that she appeared more fatigued than normal. After she was brought back to her intermediate home, she reportedly was more tachypnic and hypoxic, therefore she was brought here for evaluation.      She was initially tachycardic, tachypneic, and had large pleural " "effusion/pneumonia in the right on chest x-ray along with lactic acidosis and leukocytosis.  She was given vancomycin and Zosyn, and hospital medicine consulted for admission."       Hospital Course:  No notes on file      Past Medical History:   Diagnosis Date    Acute cystitis without hematuria 2/27/2020    Acute hypoxemic respiratory failure 4/11/2018    Acute respiratory failure with hypoxia 3/2/2020    KERMIT (acute kidney injury) 3/13/2019    Altered mental status     Anemia     Arthritis     Bilateral hand pain 3/14/2018    Branch retinal vein occlusion, left eye 2/20/2015    Chronic bilateral low back pain without sciatica 3/23/2017    Chronic renal failure in pediatric patient, stage 3 (moderate) 4/15/2018    Chronic renal failure syndrome, stage 3 (moderate) 4/15/2018    Chronic systolic (congestive) heart failure 8/6/2021    Last Assessment & Plan:  Formatting of this note might be different from the original. 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 Hospitalization late 2021, w/ rehab to follow (no notes available) --12/13/2021-CT head revealed thin extra-axial hyperdense collection overlying the right cerebral convexity compatible with acute subdural " hematoma, neurosurgery was consulted, patient was noted with Keppra 500 mg b.i.d., apixaban was held -12/19/    Iron deficiency anemia secondary to inadequate dietary iron intake 6/24/2013    Joint pain     Keratoconjunctivitis sicca of both eyes not specified as Sjogren's 7/29/2016    Leiomyoma of uterus, unspecified 9/16/2014    Long QT interval 6/29/2016    Due to medication (plaquenil)     Macular edema 1/12/2015    Multinodular goiter 1/12/2016    Neuropathy 8/23/2017    Plaquenil causing adverse effect in therapeutic use 10/7/2016    Pneumococcal vaccine refused 8/17/2016    Pneumonia due to Streptococcus pneumoniae 4/5/2018    Poor nutrition 12/23/2018    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/2015    RA (rheumatoid arthritis) 12/13/2021    -managed by rhematology, since this is a duplicate problem list entry, will archive this     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       Past Surgical History:   Procedure Laterality Date    BREAST CYST EXCISION      CATARACT EXTRACTION      COLONOSCOPY N/A 9/29/2015    Procedure: COLONOSCOPY;  Surgeon: FIDELINA Sanchez MD;  Location: [address]);  Service: Endoscopy;  Laterality: N/A;    EYE SURGERY      INJECTION OF ANESTHETIC AGENT AROUND NERVE Left 4/19/2021    Procedure: BLOCK, NERVE, FEMORAL AND OBTURATOR;  Surgeon: Shivam Gonzalez MD;  Location: [facility];  Service: Pain Management;  Laterality: Left;  consent needed    JOINT REPLACEMENT      right knee    KNEE SURGERY Left 12/31/2013    TKR    left parotidectomy      mixed tumor    PA EVAL,SWALLOW FUNCTION,CINE/VIDEO RECORD  6/5/2021          SALIVARY GLAND SURGERY      TONSILLECTOMY      UPPER GASTROINTESTINAL ENDOSCOPY         Review of patient's allergies indicates:  No Known Allergies    Medications:  Continuous Infusions:   heparin (porcine) in D5W 12 Units/kg/hr (01/31/24 1106)     Scheduled Meds:   atorvastatin  40 mg Oral QHS    ceFEPime IV (PEDS and ADULTS)  2 g Intravenous Q12H    fluticasone furoate-vilanteroL  1 puff Inhalation Daily    gabapentin  100 mg Oral TID    hydroxychloroquine  200 mg Oral Daily    pantoprazole  40 mg Oral Daily    predniSONE  10 mg Oral Daily    thiamine  100 mg Oral Daily    vancomycin (VANCOCIN) IV (PEDS and ADULTS)  1,250 mg Intravenous Q24H     PRN Meds:acetaminophen, acetaminophen, albuterol-ipratropium, aluminum-magnesium hydroxide-simethicone, heparin (PORCINE), heparin (PORCINE), melatonin, naloxone, ondansetron, polyethylene glycol, QUEtiapine, simethicone, sodium chloride 0.9%, Pharmacy to dose Vancomycin consult **AND** vancomycin - pharmacy to dose    Family History       Problem Relation (Age of Onset)    Breast cancer Maternal Grandmother    Cancer Father    Heart disease Mother    Hypertension Mother    Hyperthyroidism Mother, Other    Prostate cancer Father          Tobacco Use    Smoking status: Never     Passive exposure: Never    Smokeless tobacco: Never   Substance and Sexual Activity    Alcohol use: Yes     Alcohol/week: 0.0 standard drinks of alcohol     Comment: very seldom     Drug use: No    Sexual activity: Not Currently     Comment: ,  age 52,        Review of Systems   Constitutional:  Positive for appetite change.   Respiratory:  Positive for cough.      Objective:     Vital Signs (Most Recent):  Temp: 97.8 °F (36.6 °C) (01/31/24 0756)  Pulse: 96 (01/31/24 1107)  Resp: 20 (01/31/24 0756)  BP: (!) 121/55 (01/31/24 0756)  SpO2: 95 % (01/31/24 1002) Vital Signs (24h Range):  Temp:  [97.8 °F (36.6 °C)-98.2 °F (36.8 °C)] 97.8 °F (36.6 °C)  Pulse:  [] 96  Resp:  [17-20]  20  SpO2:  [90 %-98 %] 95 %  BP: (103-131)/(51-55) 121/55     Weight: 71.7 kg (158 lb)  Body mass index is 27.12 kg/m².       Physical Exam  Constitutional:       Appearance: She is ill-appearing.   Neurological:      Mental Status: She is disoriented.            Review of Symptoms      Symptom Assessment (ESAS 0-10 Scale)  Unable to complete assessment due to Dementia         Pain Assessment in Advanced Demential Scale (PAINAD)   Breathing - Independent of vocalization:  0  Negative vocalization:  0  Facial expression:  0  Body language:  0  Consolability:  0  Total:  0    Living Arrangements:  Lives in home    Psychosocial/Cultural:   See Palliative Psychosocial Note: Yes  Ms. Lux resides in [facility] and has 2 daughters active in her care.   **Primary  to Follow**  Palliative Care  Consult: Yes        Advance Care Planning  Advance Directives:   Living Will: Yes        Copy on chart: Yes      Decision Making:  Family answered questions  Goals of Care: What is most important right now is to focus on extending life as long as possible, even it it means sacrificing quality, curative/life-prolongation (regardless of treatment burdens). Accordingly, we have decided that the best plan to meet the patient's goals includes continuing with treatment.         Significant Labs: BMP:   Recent Labs   Lab 01/31/24  0231   GLU 73   *   K 3.5      CO2 19*   BUN 17   CREATININE 0.9   CALCIUM 8.5*   MG 1.6     CBC:   Recent Labs   Lab 01/30/24  1942 01/31/24  0231 01/31/24  0740   WBC 10.78 12.65  12.65 14.88*   HGB 8.0* 7.7*  7.7* 8.3*   HCT 26.1* 25.3*  25.3* 26.9*   * 109*  109* 104*     CBC:   Recent Labs   Lab 01/31/24  0740   WBC 14.88*   HGB 8.3*   HCT 26.9*   MCV 81*   *     BMP:  Recent Labs   Lab 01/31/24  0231   GLU 73   *   K 3.5      CO2 19*   BUN 17   CREATININE 0.9   CALCIUM 8.5*   MG 1.6     LFT:  Lab Results   Component Value Date     AST 10 01/31/2024    ALKPHOS 53 (L) 01/31/2024    BILITOT 1.1 (H) 01/31/2024     Albumin:   Albumin   Date Value Ref Range Status   01/31/2024 2.5 (L) 3.5 - 5.2 g/dL Final     Protein:   Total Protein   Date Value Ref Range Status   01/31/2024 5.2 (L) 6.0 - 8.4 g/dL Final     Lactic acid:   Lab Results   Component Value Date    LACTATE 1.2 01/30/2024    LACTATE 1.2 09/03/2023       Significant Imaging: I have reviewed all pertinent imaging results/findings within the past 24 hours.      I spent a total of 75 minutes on the day of the visit. This includes face to face time in discussion of goals of care, symptom assessment, coordination of care and emotional support.  This also includes non-face to face time preparing to see the patient (eg, review of tests/imaging), obtaining and/or reviewing separately obtained history, documenting clinical information in the electronic or other health record, independently interpreting results and communicating results to the patient/family/caregiver, or care coordinator.    Frances Moore, CNS  Palliative Medicine  [facility] ([place] [facility])

## 2024-01-31 NOTE — CONSULTS
Please see medical student attestation note.       I hereby acknowledge that I am relying on documentation authored by a medical student working under my supervision and further hereby attest first that I have verified the student documentation or findings by personally re-performing the physical exam and medical decision making activities.      Patient seen and examined with pulmonary consult team.  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  Known goiter.  Presented with increased fatigue.  CT of chest with large right sided pleural effusion.  Resting comfortably on minimal oxygen.     Recommendations>   Will need diagnostic thoracentesis and evacuation of majority of plerual fluid. With immunocompromised state, concer for complicated parapneumonia effusion although appears free flowing.  With neurologic deficit, bedside thoracentesis will be challenging so recommend to be performed in IR>    2.  Goals of care should be discussed.  Goiter continues to enlarge and may one day compromise her airway.  Recommend palliative care consult and discussion with daughters.

## 2024-01-31 NOTE — ASSESSMENT & PLAN NOTE
1/30  CT chest - .  Enlarged multinodular thyroid gland with complex hypoattenuating substernal mass thought to be of thyroid origin.  This mass was present previously but now causes more mass effect, with new narrowing of the right mainstem bronchus.  Malignancy is not excluded. Pulmonology consulted, TSH WNL .  Goiter continues to enlarge and may one day compromise her airway. palliative care consulted for discussion with daughters.  1/31   Endocrinology consulted for multinodular goiter.  Discussed with the daughter at the bedside.

## 2024-01-31 NOTE — ASSESSMENT & PLAN NOTE
Currently rate controlled.   1/31 Eliquis on hold. Heparin drip not started overnight due to concerns for bloody stains on gown. Per staff no witnessed hemoptysis. Hb at 7.6-->8.3 . PTT elevated at baseline 37. started on heparin drip.

## 2024-01-31 NOTE — ASSESSMENT & PLAN NOTE
Known history of multinodular goiter with substernal extension  Substernal component of this thyroid tissue has been known for many years and has demonstrated stable size over time with continued component of mass effect on the trachea.  History of benign thyroid nodule biopsies in 2020    With no significant change in recent years there would be no specific suggestion of any intervention at this time.  Would await thoracentesis to see if respiratory status improves as he thyroid mass component has not caused respiratory compromise before despite the mass effect for multiple years    If respiratory status remains unchanged after thoracentesis and treatment of pneumonia and family has concerns for this thyroid goiter/mass then patient could possibly undergo further workup outpatient with endocrine surgery.  Given her thyroid has a large substernal component any surgical intervention would require an open procedure of the chest more than likely and given her co-morbidities this is unlikely to be a feasible option.  If further discussion with surgery is desired then we would have to have involvement of endocrine surgery, Dr. Laureano, at that time.      For now as planned thoracentesis without further intervention on known substernal thyroid goiter component.  Thyroid studies also noted to be WNL.

## 2024-01-31 NOTE — ASSESSMENT & PLAN NOTE
Remains on chronic steroids, prednisone 10 mg daily.  With long term steroid dosing suspect there is a component of underlying adrenal insufficiency  If concerns for hypotension and/or fever then consider temporary stress dosing with steroids at double usual home dose until improvement then resumption of usual dosing.

## 2024-01-31 NOTE — HPI
"Per chart review: "Selma Lux is a 88 y.o. female with RA with ILD on Cellcept and prednisone,  history of CVA with resultant vascular dementia, L hemiparesis, and debility/bedbound status, AFib, HTN, HFpEF who presents today with hypoxia.      History is provided by her daughter at bedside as she is unable to provide due to her dementia.      She states that at baseline, her mother has advancing dementia with waxing and waning mental status, which is unfortunately recently progressing. She is bedbound and favors lying to her R side given her L hemiparesis. Last week, she developed congestion and mucous production, so her PCP obtained a CXR which showed PNA. She has been on antibiotics since then, and was placed on oxygen as well.  Today, she was taken to a Rheumatology appointment, and her daughter noted that she appeared more fatigued than normal. After she was brought back to her assisted home, she reportedly was more tachypnic and hypoxic, therefore she was brought here for evaluation.      She was initially tachycardic, tachypneic, and had large pleural effusion/pneumonia in the right on chest x-ray along with lactic acidosis and leukocytosis.  She was given vancomycin and Zosyn, and hospital medicine consulted for admission."     "

## 2024-01-31 NOTE — ASSESSMENT & PLAN NOTE
Patient's with Normocytic anemia.. Hemoglobin stable. Etiology likely due to chronic disease .  Current CBC reviewed-    Recent Labs   Lab 01/31/24  0231 01/31/24  0740 01/31/24  1131   HGB 7.7*  7.7* 8.3* 8.4*         Component Value Date/Time    MCV 81 (L) 01/31/2024 1131    RDW 18.5 (H) 01/31/2024 1131    IRON 14 (L) 10/13/2022 0349    FERRITIN 248 10/13/2022 0349    FOLATE 5.2 09/02/2023 1015    MSZIDTVC30 1328 (H) 09/02/2023 1015     Monitor CBC and transfuse if H/H drops below 7/21.    1/31Eliquis on hold. Heparin drip not started overnight due to concern for coughing up blood. Hb at 7.6. PTT elevated at baseline 37. sats 96% on 3LNC

## 2024-01-31 NOTE — NURSING
Pt arrived to floor soiled and while changing pt with her lying on her side pt began coughing. When pt was turned back over noted red fluid on bed and pooling in her mouth. Pt continue to cough and was sat up. Spoke with pt dtr about if pt was given anything pink or red to swallow. Pt dtr stated no. Pt smoothie had blueberries in it and otherwise was given applesauce and pudding so nothing red given. Spoke with on call physician Dr. Ponce  and let him know that the pt appeared to be coughing up blood and asked about heparin gtt. Physician told nursing to hold heparin gtt for now. Let physician know that the eloquis was d/c as well in this pt and that family had questions about plan of care especially in regards to heparin. Provider unable to come to bedside. Heparin gtt held at this time. Abx infusing to pt. Pt did well with meds crushed in applesauce. Dtr at bedside. Bed alarm in place.

## 2024-01-31 NOTE — CARE UPDATE
RAPID RESPONSE NURSE CHART REVIEW        Chart Reviewed: 01/31/2024, 12:28 PM    MRN: [id number]  Bed: 69008/42514 A    Dx: Sepsis due to pneumonia    Selma Lux has a past medical history of Acute cystitis without hematuria, Acute hypoxemic respiratory failure, Acute respiratory failure with hypoxia, KERMIT (acute kidney injury), Altered mental status, Anemia, Arthritis, Bilateral hand pain, Branch retinal vein occlusion, left eye, Chronic bilateral low back pain without sciatica, Chronic renal failure in pediatric patient, stage 3 (moderate), Chronic renal failure syndrome, stage 3 (moderate), Chronic systolic (congestive) heart failure, Cognitive communication deficit, COPD exacerbation, Cystoid macular edema, left eye, Cystoid macular edema, left eye, Delirium, DJD (degenerative joint disease) of cervical spine, Enterococcal bacteremia, Fatty liver, Goiter, Hashimoto's disease, Hemichorea, History of 2019 novel coronavirus disease (COVID-19), Hypertension, Hypertensive encephalopathy, IBS (irritable bowel syndrome), IGT (impaired glucose tolerance), Intracranial subdural hematoma, Iron deficiency anemia secondary to inadequate dietary iron intake, Joint pain, Keratoconjunctivitis sicca of both eyes not specified as Sjogren's, Leiomyoma of uterus, unspecified, Long QT interval, Macular edema, Multinodular goiter, Neuropathy, Plaquenil causing adverse effect in therapeutic use, Pneumococcal vaccine refused, Pneumonia due to Streptococcus pneumoniae, Poor nutrition, Primary osteoarthritis involving multiple joints, RA (rheumatoid arthritis), Retinal macroaneurysm of left eye, s/p Right Total knee replacement, Scoliosis of thoracic spine, Small vessel disease, cerebrovascular, Stroke, Traumatic subdural hemorrhage with loss of consciousness of unspecified duration, initial encounter, UTI (urinary tract infection), and Vascular dementia.    Last VS: /61 (Patient Position: Lying)   Pulse 90   Temp 98.4 °F  "(36.9 °C) (Axillary)   Resp 20   Ht 5' 4" (1.626 m)   Wt 71.7 kg (158 lb)   LMP  (LMP Unknown)   SpO2 (!) 94%   BMI 27.12 kg/m²     24H Vital Sign Range:  Temp:  [97.8 °F (36.6 °C)-98.4 °F (36.9 °C)]   Pulse:  []   Resp:  [17-20]   BP: (103-131)/(51-61)   SpO2:  [90 %-98 %]     Level of Consciousness (AVPU): alert    Recent Labs     01/30/24  1942 01/31/24  0231 01/31/24  0740   WBC 10.78 12.65  12.65 14.88*   HGB 8.0* 7.7*  7.7* 8.3*   HCT 26.1* 25.3*  25.3* 26.9*   * 109*  109* 104*       Recent Labs     01/29/24  1836 01/30/24  1351 01/31/24  0231    135* 134*   K 4.9 3.8 3.5   * 109 105   CO2 14* 19* 19*   BUN 23 18 17   CREATININE 0.9 0.9 0.9   GLU 55* 108 73   MG  --  1.5* 1.6        Recent Labs     01/29/24  1905   PH 7.423   PCO2 25.9*   PO2 51*   HCO3 17.0*   POCSATURATED 88   BE -7*        OXYGEN:  Flow (L/min): 3          MEWS score: 1    Rounding completed with charge MARILYN Martinez. Discussed increasing WBC. Urine cultures positive yesterday. On Abx. Afebrile. VSS. No additional concerns verbalized at this time. Instructed to call [phone] for further concerns or assistance.    Jazmyne Smith RN       "

## 2024-02-01 NOTE — SUBJECTIVE & OBJECTIVE
Past Medical History:   Diagnosis Date    Acute cystitis without hematuria 2/27/2020    Acute hypoxemic respiratory failure 4/11/2018    Acute respiratory failure with hypoxia 3/2/2020    KERMIT (acute kidney injury) 3/13/2019    Altered mental status     Anemia     Arthritis     Bilateral hand pain 3/14/2018    Branch retinal vein occlusion, left eye 2/20/2015    Chronic bilateral low back pain without sciatica 3/23/2017    Chronic renal failure in pediatric patient, stage 3 (moderate) 4/15/2018    Chronic renal failure syndrome, stage 3 (moderate) 4/15/2018    Chronic systolic (congestive) heart failure 8/6/2021    Last Assessment & Plan:  Formatting of this note might be different from the original. 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 Hospitalization late 2021, w/ rehab to follow (no notes available) --12/13/2021-CT head revealed thin extra-axial hyperdense collection overlying the right cerebral convexity compatible with acute subdural hematoma, neurosurgery was consulted, patient was noted with Keppra 500 mg b.i.d., apixaban was held -12/19/    Iron deficiency anemia secondary to inadequate dietary iron intake 6/24/2013    Joint pain      Keratoconjunctivitis sicca of both eyes not specified as Sjogren's 7/29/2016    Leiomyoma of uterus, unspecified 9/16/2014    Long QT interval 6/29/2016    Due to medication (plaquenil)     Macular edema 1/12/2015    Multinodular goiter 1/12/2016    Neuropathy 8/23/2017    Plaquenil causing adverse effect in therapeutic use 10/7/2016    Pneumococcal vaccine refused 8/17/2016    Pneumonia due to Streptococcus pneumoniae 4/5/2018    Poor nutrition 12/23/2018    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/2015    RA (rheumatoid arthritis) 12/13/2021    -managed by rhematology, since this is a duplicate problem list entry, will archive this     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       Past Surgical History:   Procedure Laterality Date    BREAST CYST EXCISION      CATARACT EXTRACTION      COLONOSCOPY N/A 9/29/2015    Procedure: COLONOSCOPY;  Surgeon: FIDELINA Sanchez MD;  [facility]  Service: Endoscopy;  Laterality: N/A;    EYE SURGERY      INJECTION OF ANESTHETIC AGENT AROUND NERVE Left 4/19/2021    Procedure: BLOCK, NERVE, FEMORAL AND OBTURATOR;  Surgeon: Shivam Gonzalez MD;  Location: [facility] PAIN MGT;  Service: Pain Management;  Laterality: Left;  consent needed    JOINT REPLACEMENT      right knee    KNEE SURGERY Left 12/31/2013    TKR    left parotidectomy      mixed tumor    NV EVAL,SWALLOW FUNCTION,CINE/VIDEO RECORD  6/5/2021         SALIVARY GLAND SURGERY      TONSILLECTOMY      UPPER GASTROINTESTINAL ENDOSCOPY         Review of patient's allergies indicates:  No Known Allergies    Medications:  Continuous Infusions:   heparin (porcine)  in D5W 14 Units/kg/hr (02/01/24 1243)     Scheduled Meds:   atorvastatin  40 mg Oral QHS    ceFEPime IV (PEDS and ADULTS)  2 g Intravenous Q12H    fluticasone furoate-vilanteroL  1 puff Inhalation Daily    gabapentin  100 mg Oral TID    guaiFENesin 100 mg/5 ml  200 mg Oral Q4H While awake    hydroxychloroquine  200 mg Oral Daily    pantoprazole  40 mg Oral Daily    predniSONE  10 mg Oral Daily    thiamine  100 mg Oral Daily    vancomycin (VANCOCIN) IV (PEDS and ADULTS)  750 mg Intravenous Q24H     PRN Meds:acetaminophen, acetaminophen, albuterol-ipratropium, aluminum-magnesium hydroxide-simethicone, heparin (PORCINE), heparin (PORCINE), melatonin, naloxone, ondansetron, polyethylene glycol, QUEtiapine, simethicone, sodium chloride 0.9%, Pharmacy to dose Vancomycin consult **AND** vancomycin - pharmacy to dose    Family History       Problem Relation (Age of Onset)    Breast cancer Maternal Grandmother    Cancer Father    Heart disease Mother    Hypertension Mother    Hyperthyroidism Mother, Other    Prostate cancer Father          Tobacco Use    Smoking status: Never     Passive exposure: Never    Smokeless tobacco: Never   Substance and Sexual Activity    Alcohol use: Yes     Alcohol/week: 0.0 standard drinks of alcohol     Comment: very seldom     Drug use: No    Sexual activity: Not Currently     Comment: ,  age 52,        Review of Systems   Constitutional:  Positive for appetite change.   Respiratory:  Positive for cough.      Objective:     Vital Signs (Most Recent):  Temp: 98 °F (36.7 °C) (02/01/24 0746)  Pulse: 96 (02/01/24 1233)  Resp: (!) 24 (02/01/24 1110)  BP: 129/77 (02/01/24 1233)  SpO2: 99 % (02/01/24 1233) Vital Signs (24h Range):  Temp:  [97.1 °F (36.2 °C)-98 °F (36.7 °C)] 98 °F (36.7 °C)  Pulse:  [] 96  Resp:  [16-32] 24  SpO2:  [84 %-100 %] 99 %  BP: (128-162)/(58-81) 129/77     Weight: 71.7 kg (158 lb)  Body mass index is 27.12 kg/m².       Physical Exam  Constitutional:        Appearance: She is ill-appearing.   Neurological:      Mental Status: She is disoriented.            Review of Symptoms      Symptom Assessment (ESAS 0-10 Scale)  Pain:  0  Dyspnea:  0  Anxiety:  0  Nausea:  0  Depression:  0  Anorexia:  0  Fatigue:  0  Insomnia:  0  Restlessness:  0  Agitation:  0 due to Dementia         Pain Assessment in Advanced Demential Scale (PAINAD)   Breathing - Independent of vocalization:  0  Negative vocalization:  0  Facial expression:  0  Body language:  0  Consolability:  0  Total:  0    Living Arrangements:  Lives in home    Psychosocial/Cultural:   See Palliative Psychosocial Note: Yes  Ms. Lux resides in [facility] and has 2 daughters active in her care.   **Primary  to Follow**  Palliative Care  Consult: Yes        Advance Care Planning   Advance Directives:   Living Will: Yes        Copy on chart: Yes      Decision Making:  Family answered questions  Goals of Care: What is most important right now is to focus on extending life as long as possible, even it it means sacrificing quality, curative/life-prolongation (regardless of treatment burdens). Accordingly, we have decided that the best plan to meet the patient's goals includes continuing with treatment.         Significant Labs: BMP:   Recent Labs   Lab 02/01/24 0445   GLU 81      K 4.2   *   CO2 18*   BUN 13   CREATININE 0.8   CALCIUM 8.6*   MG 1.6       CBC:   Recent Labs   Lab 01/31/24  1725 02/01/24  0042 02/01/24 0445   WBC 8.57 10.77 13.15*   HGB 8.4* 7.5* 8.1*   HCT 27.4* 24.3* 26.0*   * 118* 124*       CBC:   Recent Labs   Lab 02/01/24 0445   WBC 13.15*   HGB 8.1*   HCT 26.0*   MCV 79*   *       BMP:  Recent Labs   Lab 02/01/24 0445   GLU 81      K 4.2   *   CO2 18*   BUN 13   CREATININE 0.8   CALCIUM 8.6*   MG 1.6       LFT:  Lab Results   Component Value Date    AST 10 02/01/2024    ALKPHOS 56 02/01/2024    BILITOT 1.4 (H) 02/01/2024      Albumin:   Albumin   Date Value Ref Range Status   02/01/2024 2.6 (L) 3.5 - 5.2 g/dL Final     Protein:   Total Protein   Date Value Ref Range Status   02/01/2024 5.4 (L) 6.0 - 8.4 g/dL Final     Lactic acid:   Lab Results   Component Value Date    LACTATE 1.2 01/30/2024    LACTATE 1.2 09/03/2023       Significant Imaging: I have reviewed all pertinent imaging results/findings within the past 24 hours.

## 2024-02-01 NOTE — ASSESSMENT & PLAN NOTE
IR consult for thoracentesis,  Please ensure pleural fluid is sent for AFB cultures, KOH and fungal culture, gram stain and cultures.  Cell count and diff, ldh, cytology, albumin, protein, glucose.     Will need serologic LDH and CMP to compare.k    Continue antibiotics

## 2024-02-01 NOTE — PLAN OF CARE
Problem: Adjustment to Illness (Delirium)  Goal: Optimal Coping  Outcome: Ongoing, Progressing     Problem: Altered Behavior (Delirium)  Goal: Improved Behavioral Control  Outcome: Ongoing, Progressing     Problem: Attention and Thought Clarity Impairment (Delirium)  Goal: Improved Attention and Thought Clarity  Outcome: Ongoing, Progressing     Problem: Sleep Disturbance (Delirium)  Goal: Improved Sleep  Outcome: Ongoing, Progressing     Problem: Adult Inpatient Plan of Care  Goal: Plan of Care Review  Outcome: Ongoing, Progressing  Goal: Patient-Specific Goal (Individualized)  Outcome: Ongoing, Progressing  Goal: Absence of Hospital-Acquired Illness or Injury  Outcome: Ongoing, Progressing  Goal: Optimal Comfort and Wellbeing  Outcome: Ongoing, Progressing  Goal: Readiness for Transition of Care  Outcome: Ongoing, Progressing     Problem: Coping Ineffective  Goal: Effective Coping  Outcome: Ongoing, Progressing     Problem: Adjustment to Illness (Sepsis/Septic Shock)  Goal: Optimal Coping  Outcome: Ongoing, Progressing     Problem: Bleeding (Sepsis/Septic Shock)  Goal: Absence of Bleeding  Outcome: Ongoing, Progressing     Problem: Glycemic Control Impaired (Sepsis/Septic Shock)  Goal: Blood Glucose Level Within Desired Range  Outcome: Ongoing, Progressing     Problem: Infection Progression (Sepsis/Septic Shock)  Goal: Absence of Infection Signs and Symptoms  Outcome: Ongoing, Progressing     Problem: Nutrition Impaired (Sepsis/Septic Shock)  Goal: Optimal Nutrition Intake  Outcome: Ongoing, Progressing     Problem: Fluid Imbalance (Pneumonia)  Goal: Fluid Balance  Outcome: Ongoing, Progressing     Problem: Infection (Pneumonia)  Goal: Resolution of Infection Signs and Symptoms  Outcome: Ongoing, Progressing     Problem: Respiratory Compromise (Pneumonia)  Goal: Effective Oxygenation and Ventilation  Outcome: Ongoing, Progressing     Problem: Impaired Wound Healing  Goal: Optimal Wound Healing  Outcome: Ongoing,  Progressing     Problem: Fall Injury Risk  Goal: Absence of Fall and Fall-Related Injury  Outcome: Ongoing, Progressing     Problem: Skin Injury Risk Increased  Goal: Skin Health and Integrity  Outcome: Ongoing, Progressing     Problem: Restraint, Nonbehavioral (Nonviolent)  Goal: Absence of Harm or Injury  Outcome: Ongoing, Progressing

## 2024-02-01 NOTE — PROGRESS NOTES
"[facility] ([place] [address])  Palliative Medicine  Progress Note    Patient Name: Selma Lux  MRN: [id number]  Admission Date: 1/29/2024  Hospital Length of Stay: 3 days  Code Status: Full Code   Attending Provider: Cliff Zacarias*  Consulting Provider: DEBORAH Monique  Primary Care Physician: Génesis Rosario MD  Principal Problem:Sepsis due to pneumonia    Patient information was obtained from primary team.      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 Daughter states that at baseline, her mother has advancing dementia with waxing and waning mental status, which is unfortunately recently progressing. At bs today, pt is awake and oriented to person and place, answer simple questions, and on 3lpm NC.    Reason for Consult Per communication w/Dr. Castillo, [facility] consult for assistance with GOC/ACP.    ACP/GOC 2/1/24- Left VM with daughter Madhavi yesterday to coordinate time for meeting today. Went to pt's bs this am, no one present, left card on sofa, and asked RN to message me if family comes. Went to room later in afternoon, RN said family came and they took my card, and said they will call me if needed. Pt did go for thoracentesis today, not completed due to "  A safe percutaneous window could not be identified". Pt also reportedly de-sated during positioning for thora. Currently, she remains at bs in room on NC and in no accute distress. I will follow up with family tomorrow.     1/31/24 I spoke with daughter (Madhavi) at length today. She has a very strong knowledge base about all of her mother's co-morbidities and the increasing complications managing them in the face of progressive dementia. She has been " "utilizing the Dementia clinic's resources for family support and is familiar with the progressive decline of dementia. She was amenable to speaking w/Palliaitve care, as someone in the clinic had recently suggested they speak w/palliative. She has noticed a decline in her mother over the last few months and is ready to have a meaningful GOC discussion. She states her mother is followed by multiple teams (rheumatology, pulm, primary, neuro, and endocrine) and she would like to make sure we are addressing all aspects of care in order to have meaningful GOC. Dr. Castillo at  today and discussed pending thoracentesis and concern of increased size of thyroid mass. I have messaged pt's primary care provider (Dr. Rosario) as per family's request to alert her of admission. Madhavi will speak to her sister and will arrange time for us all to meet tomorrow to further discuss GOC and code status.    [facility] Pt has 2 daughters Madhavi and Rosy who are decision makers.     Symptoms   -Debility r/t CVA  -Decreased nutrition, r/t progressive inability to swallow safely    Recommendations  -Pulmonary to continue following for recs, per family.  - Endocrine requested per family for recs  -Will meet tomorrow w/ sisters.           I will follow-up with patient. Please contact us if you have any additional questions.    Subjective:     Chief Complaint:   Chief Complaint   Patient presents with    Hypoxia     Pt coming from [facility] for RA sat 85%, arrives on 97% 3L O2 NC, on abx for pneumonia; +tachypneic, a fib rvr 140s, significant cardiac hx       HPI:   Per chart review: "Selma Lux is a 88 y.o. female with RA with ILD on Cellcept and prednisone,  history of CVA with resultant vascular dementia, L hemiparesis, and debility/bedbound status, AFib, HTN, HFpEF who presents today with hypoxia.      History is provided by her daughter at bedside as she is unable to provide due to her dementia.      She states that " "at baseline, her mother has advancing dementia with waxing and waning mental status, which is unfortunately recently progressing. She is bedbound and favors lying to her R side given her L hemiparesis. Last week, she developed congestion and mucous production, so her PCP obtained a CXR which showed PNA. She has been on antibiotics since then, and was placed on oxygen as well.  Today, she was taken to a Rheumatology appointment, and her daughter noted that she appeared more fatigued than normal. After she was brought back to her longterm home, she reportedly was more tachypnic and hypoxic, therefore she was brought here for evaluation.      She was initially tachycardic, tachypneic, and had large pleural effusion/pneumonia in the right on chest x-ray along with lactic acidosis and leukocytosis.  She was given vancomycin and Zosyn, and hospital medicine consulted for admission."       Hospital Course:  No notes on file      Past Medical History:   Diagnosis Date    Acute cystitis without hematuria 2/27/2020    Acute hypoxemic respiratory failure 4/11/2018    Acute respiratory failure with hypoxia 3/2/2020    KERMIT (acute kidney injury) 3/13/2019    Altered mental status     Anemia     Arthritis     Bilateral hand pain 3/14/2018    Branch retinal vein occlusion, left eye 2/20/2015    Chronic bilateral low back pain without sciatica 3/23/2017    Chronic renal failure in pediatric patient, stage 3 (moderate) 4/15/2018    Chronic renal failure syndrome, stage 3 (moderate) 4/15/2018    Chronic systolic (congestive) heart failure 8/6/2021    Last Assessment & Plan:  Formatting of this note might be different from the original. 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 Hospitalization late 2021, w/ rehab to follow (no notes available) --12/13/2021-CT head revealed thin extra-axial hyperdense collection overlying the right cerebral convexity compatible with acute subdural hematoma, neurosurgery was consulted, patient was noted with Keppra 500 mg b.i.d., apixaban was held -12/19/    Iron deficiency anemia secondary to inadequate dietary iron intake 6/24/2013    Joint pain     Keratoconjunctivitis sicca of both eyes not specified as Sjogren's 7/29/2016    Leiomyoma of uterus, unspecified 9/16/2014    Long QT interval 6/29/2016    Due to medication (plaquenil)     Macular edema 1/12/2015    Multinodular goiter 1/12/2016    Neuropathy 8/23/2017    Plaquenil causing adverse effect in therapeutic use 10/7/2016    Pneumococcal vaccine refused 8/17/2016    Pneumonia due to Streptococcus pneumoniae 4/5/2018    Poor nutrition 12/23/2018    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/2015    RA (rheumatoid arthritis) 12/13/2021    -managed by rhematology, since this is a duplicate problem list entry, will archive this     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    s/p Right Total knee replacement 5/15/2013    Scoliosis of thoracic spine 5/15/2013    Small vessel disease, cerebrovascular 12/28/2017    Stroke     Traumatic subdural hemorrhage with loss of consciousness of unspecified duration, initial encounter 1/10/2023    12/2021    UTI (urinary tract infection) 12/29/2018     Vascular dementia 12/6/2017       Past Surgical History:   Procedure Laterality Date    BREAST CYST EXCISION      CATARACT EXTRACTION      COLONOSCOPY N/A 9/29/2015    Procedure: COLONOSCOPY;  Surgeon: FIDELINA Sanchez MD;  Location: [facility] ENDO (4TH FLR);  Service: Endoscopy;  Laterality: N/A;    EYE SURGERY      INJECTION OF ANESTHETIC AGENT AROUND NERVE Left 4/19/2021    Procedure: BLOCK, NERVE, FEMORAL AND OBTURATOR;  Surgeon: Shivam Gonzalez MD;  Location: [facility] PAIN MGT;  Service: Pain Management;  Laterality: Left;  consent needed    JOINT REPLACEMENT      right knee    KNEE SURGERY Left 12/31/2013    TKR    left parotidectomy      mixed tumor    IA EVAL,SWALLOW FUNCTION,CINE/VIDEO RECORD  6/5/2021         SALIVARY GLAND SURGERY      TONSILLECTOMY      UPPER GASTROINTESTINAL ENDOSCOPY         Review of patient's allergies indicates:  No Known Allergies    Medications:  Continuous Infusions:   heparin (porcine) in D5W 14 Units/kg/hr (02/01/24 1243)     Scheduled Meds:   atorvastatin  40 mg Oral QHS    ceFEPime IV (PEDS and ADULTS)  2 g Intravenous Q12H    fluticasone furoate-vilanteroL  1 puff Inhalation Daily    gabapentin  100 mg Oral TID    guaiFENesin 100 mg/5 ml  200 mg Oral Q4H While awake    hydroxychloroquine  200 mg Oral Daily    pantoprazole  40 mg Oral Daily    predniSONE  10 mg Oral Daily    thiamine  100 mg Oral Daily    vancomycin (VANCOCIN) IV (PEDS and ADULTS)  750 mg Intravenous Q24H     PRN Meds:acetaminophen, acetaminophen, albuterol-ipratropium, aluminum-magnesium hydroxide-simethicone, heparin (PORCINE), heparin (PORCINE), melatonin, naloxone, ondansetron, polyethylene glycol, QUEtiapine, simethicone, sodium chloride 0.9%, Pharmacy to dose Vancomycin consult **AND** vancomycin - pharmacy to dose    Family History       Problem Relation (Age of Onset)    Breast cancer Maternal Grandmother    Cancer Father    Heart disease Mother    Hypertension Mother    Hyperthyroidism Mother, Other    Prostate  cancer Father          Tobacco Use    Smoking status: Never     Passive exposure: Never    Smokeless tobacco: Never   Substance and Sexual Activity    Alcohol use: Yes     Alcohol/week: 0.0 standard drinks of alcohol     Comment: very seldom     Drug use: No    Sexual activity: Not Currently     Comment: ,  age 52,        Review of Systems   Constitutional:  Positive for appetite change.   Respiratory:  Positive for cough.      Objective:     Vital Signs (Most Recent):  Temp: 98 °F (36.7 °C) (02/01/24 0746)  Pulse: 96 (02/01/24 1233)  Resp: (!) 24 (02/01/24 1110)  BP: 129/77 (02/01/24 1233)  SpO2: 99 % (02/01/24 1233) Vital Signs (24h Range):  Temp:  [97.1 °F (36.2 °C)-98 °F (36.7 °C)] 98 °F (36.7 °C)  Pulse:  [] 96  Resp:  [16-32] 24  SpO2:  [84 %-100 %] 99 %  BP: (128-162)/(58-81) 129/77     Weight: 71.7 kg (158 lb)  Body mass index is 27.12 kg/m².       Physical Exam  Constitutional:       Appearance: She is ill-appearing.   Neurological:      Mental Status: She is disoriented.            Review of Symptoms      Symptom Assessment (ESAS 0-10 Scale)  Pain:  0  Dyspnea:  0  Anxiety:  0  Nausea:  0  Depression:  0  Anorexia:  0  Fatigue:  0  Insomnia:  0  Restlessness:  0  Agitation:  0 due to Dementia         Pain Assessment in Advanced Demential Scale (PAINAD)   Breathing - Independent of vocalization:  0  Negative vocalization:  0  Facial expression:  0  Body language:  0  Consolability:  0  Total:  0    Living Arrangements:  Lives in home    Psychosocial/Cultural:   See Palliative Psychosocial Note: Yes  Ms. Lux resides in [facility] and has 2 daughters active in her care.   **Primary  to Follow**  Palliative Care  Consult: Yes        Advance Care Planning  Advance Directives:   Living Will: Yes        Copy on chart: Yes      Decision Making:  Family answered questions  Goals of Care: What is most important right now is to focus on extending life as long as  possible, even it it means sacrificing quality, curative/life-prolongation (regardless of treatment burdens). Accordingly, we have decided that the best plan to meet the patient's goals includes continuing with treatment.         Significant Labs: BMP:   Recent Labs   Lab 02/01/24 0445   GLU 81      K 4.2   *   CO2 18*   BUN 13   CREATININE 0.8   CALCIUM 8.6*   MG 1.6       CBC:   Recent Labs   Lab 01/31/24  1725 02/01/24  0042 02/01/24 0445   WBC 8.57 10.77 13.15*   HGB 8.4* 7.5* 8.1*   HCT 27.4* 24.3* 26.0*   * 118* 124*       CBC:   Recent Labs   Lab 02/01/24 0445   WBC 13.15*   HGB 8.1*   HCT 26.0*   MCV 79*   *       BMP:  Recent Labs   Lab 02/01/24 0445   GLU 81      K 4.2   *   CO2 18*   BUN 13   CREATININE 0.8   CALCIUM 8.6*   MG 1.6       LFT:  Lab Results   Component Value Date    AST 10 02/01/2024    ALKPHOS 56 02/01/2024    BILITOT 1.4 (H) 02/01/2024     Albumin:   Albumin   Date Value Ref Range Status   02/01/2024 2.6 (L) 3.5 - 5.2 g/dL Final     Protein:   Total Protein   Date Value Ref Range Status   02/01/2024 5.4 (L) 6.0 - 8.4 g/dL Final     Lactic acid:   Lab Results   Component Value Date    LACTATE 1.2 01/30/2024    LACTATE 1.2 09/03/2023       Significant Imaging: I have reviewed all pertinent imaging results/findings within the past 24 hours.      Frances Moore, CNS  Palliative Medicine  [facility] - Med Surg ([place] [address])

## 2024-02-01 NOTE — PLAN OF CARE
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 [I69.154]  Anemia, unspecified type [D64.9]  COPD without exacerbation [J44.9]  Chronic heart failure with preserved ejection fraction [I50.32]  Heart failure with preserved ejection fraction, unspecified HF chronicity [I50.30]  Mild vascular dementia without behavioral disturbance, psychotic disturbance, mood disturbance, or anxiety [F01.A0]    Admission Date: 1/29/2024  Expected Discharge Date: 2/2/2024    Transition of Care Barriers: (P) None    Payor: MEDICARE / Plan: MEDICARE PART A & B / Product Type: Government /     Extended Emergency Contact Information  Primary Emergency Contact: Rosy Rosado  Address: [address] [place] [address]  Mobile Phone: [phone]  Relation: Daughter  Secondary Emergency Contact: Madhavi Lomas  Address: [address] [place] [address]  Mobile Phone: [phone]  Relation: Daughter    Discharge Plan A: (P) Return to nursing home  Discharge Plan B: (P) New Nursing Home placement - assisted care facility      [id number] - [place], [place] - [address]  [address]  [place] [address]  Phone: [phone] Fax: [phone]    [facility]  - [place], [place] - [address]  [address] [address]  Phone: [phone] Fax: [phone]      Initial Assessment (most recent)       Adult Discharge Assessment - 02/01/24 1534          Discharge Assessment    Assessment Type Discharge Planning Assessment     Confirmed/corrected address, phone number and insurance Yes     Confirmed Demographics Correct on Facesheet     Source of Information family     If unable to respond/provide information was family/caregiver contacted? Yes     Contact Name/Number Rosy Rosado (Daughter) [phone]     Communicated LETICIA with patient/caregiver Yes     [facility] facility resident     [facility]     Do you expect to return to your current living situation? Yes     Do you have help at home or someone to help you manage your care at home? Yes     Who are your caregiver(s) and their phone number(s)? [facility] Staff     Prior to hospitilization cognitive status: Not Oriented to Time;Not Oriented to Place;Not Oriented to Person     Current cognitive status: Not Oriented to Time;Not Oriented to Place;Not Oriented to Person     Walking or Climbing Stairs Difficulty yes     Walking or Climbing Stairs transferring difficulty, dependent;ambulation difficulty, dependent     Mobility Management wheelchair     Dressing/Bathing Difficulty yes     Dressing/Bathing dressing difficulty, dependent;bathing difficulty, dependent     Do you have any problems with: Errands/Grocery;Needs other help     Specify other help Dependent     Home Layout Able to live on 1st floor     Equipment Currently Used at Home wheelchair;bedside commode     Readmission within 30 days? No     Patient currently being followed by outpatient case management? No     Do you currently have service(s) that help you manage your care at home? Yes     Do you take prescription medications? Yes     Do you have prescription coverage? Yes      Coverage MEDICARE - MEDICARE PART A & B - (P)      Do you have any problems affording any of your prescribed medications? No     Is the patient taking medications as prescribed? yes     Who is going to help you get home at discharge? Rosy Rosado (Daughter) [phone]     How do you get to doctors appointments? agency     Are you on dialysis? No     Do you take coumadin? No     Discharge Plan A Return to nursing home (P)      Discharge Plan B New Nursing Home placement - snf care facility (P)      DME Needed Upon Discharge  none (P)      Discharge Plan discussed with: Adult children (P)      Transition of Care Barriers None (P)         Physical Activity    On average, how many days per week do you engage in moderate to strenuous exercise (like a brisk walk)? 0 days (P)      On average, how many minutes do you engage in exercise at this level? 0 min (P)         Financial Resource Strain    How hard is it for you to pay for the very basics like food, housing, medical care, and heating? Not hard at all (P)         Housing Stability    In the last 12 months, was there a time when you were not able to pay the mortgage or rent on time? No (P)      In the last 12 months, how many places have you lived? 2 (P)      In the last 12 months, was there a time when you did not have a steady place to sleep or slept in a shelter (including now)? No (P)         Transportation Needs    In the past 12 months, has lack of transportation kept you from medical appointments or from getting medications? No (P)      In the past 12 months, has lack of transportation kept you from meetings, work, or from getting things needed for daily living? No (P)         Food Insecurity    Within the past 12 months, you worried that your food would run out before you got the money to buy more. Never true (P)      Within the past 12 months, the food you bought just didn't last and you didn't have money to get more. Never true (P)         Social  Connections    In a typical week, how many times do you talk on the phone with family, friends, or neighbors? More than three times a week     How often do you get together with friends or relatives? More than three times a week     Are you , , , , never , or living with a partner?  (P)         Alcohol Use    Q1: How often do you have a drink containing alcohol? Never     Q2: How many drinks containing alcohol do you have on a typical day when you are drinking? Patient does not drink     Q3: How often do you have six or more drinks on one occasion? Never        OTHER    Name(s) of People in Home facility residents (P)                  Discharge Plan A and Plan B have been determined by review of patient's clinical status, future medical and therapeutic needs, and coverage/benefits for post-acute care in coordination with multidisciplinary team members.                       GRANT Patel, LMSW  [facility]  Case Management  Ext. [phone]

## 2024-02-01 NOTE — PROGRESS NOTES
[facility] ([place] [address])  Pulmonology  Progress Note    Patient Name: Selma Lux  MRN: [id number]  Admission Date: 1/29/2024  Hospital Length of Stay: 2 days  Code Status: Full Code  Attending Provider: Tim Castillo MD  Primary Care Provider: Génesis Rosario MD   Principal Problem: Sepsis due to pneumonia    Subjective:     Interval History: Patient remains without significant respiratory distress.  On minimal supplemental oxygen.  Hemodynamically stable.  IR has been consulted for thoracentesis.       Objective:     Vital Signs (Most Recent):  Temp: 97.7 °F (36.5 °C) (01/31/24 1942)  Pulse: 94 (01/31/24 1942)  Resp: 16 (01/31/24 1942)  BP: (!) 162/69 (01/31/24 1942)  SpO2: 98 % (01/31/24 1942) Vital Signs (24h Range):  Temp:  [97.1 °F (36.2 °C)-98.4 °F (36.9 °C)] 97.7 °F (36.5 °C)  Pulse:  [] 94  Resp:  [16-22] 16  SpO2:  [90 %-98 %] 98 %  BP: (118-162)/(53-69) 162/69     Weight: 71.7 kg (158 lb)  Body mass index is 27.12 kg/m².      Intake/Output Summary (Last 24 hours) at 1/31/2024 1953  Last data filed at 1/31/2024 0942  Gross per 24 hour   Intake 120 ml   Output 500 ml   Net -380 ml        Physical Exam  Constitutional:       Comments: Oriented to person   HENT:      Head: Normocephalic.   Cardiovascular:      Rate and Rhythm: Normal rate and regular rhythm.   Pulmonary:      Effort: Pulmonary effort is normal.      Breath sounds: Normal breath sounds.   Musculoskeletal:      Cervical back: Normal range of motion and neck supple.   Skin:     General: Skin is warm and dry.   Neurological:      General: No focal deficit present.      Mental Status: She is alert.           Review of Systems    Vents:       Lines/Drains/Airways       Peripheral Intravenous Line  Duration                  Peripheral IV - Single Lumen 01/29/24 1832 20 G Anterior;Right Hand 2 days         Peripheral IV - Single Lumen 01/31/24 1726 22 G Anterior;Proximal;Right Upper Arm <1 day                     Significant Labs:    CBC/Anemia Profile:  Recent Labs   Lab 01/31/24  0740 01/31/24  1131 01/31/24  1725   WBC 14.88* 14.54* 8.57   HGB 8.3* 8.4* 8.4*   HCT 26.9* 27.2* 27.4*   * 112* 117*   MCV 81* 81* 80*   RDW 18.4* 18.5* 18.5*        Chemistries:  Recent Labs   Lab 01/30/24  1351 01/31/24  0231   * 134*   K 3.8 3.5    105   CO2 19* 19*   BUN 18 17   CREATININE 0.9 0.9   CALCIUM 8.6* 8.5*   ALBUMIN 2.6* 2.5*   PROT 5.3* 5.2*   BILITOT 1.6* 1.1*   ALKPHOS 55 53*   ALT 10 7*   AST 11 10   MG 1.5* 1.6       All pertinent labs within the past 24 hours have been reviewed.  Leucocytosis improving.     Significant Imaging:  I have reviewed all pertinent imaging results/findings within the past 24 hours.  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     Still being fed food with significant dysarthria likely dysphagia.  I suspect there is a large component of aspiration that will continue if being fed when not fully alert, sitting up, etc.      Needs a formal speech evaluation    Pulmonary  Pleural effusion  IR consult for thoracentesis,  Please ensure pleural fluid is sent for AFB cultures, KOH and fungal culture, gram stain and cultures.  Cell count and diff, ldh, cytology, albumin, protein, glucose.     Will need serologic LDH and CMP to compare.k    Continue antibiotics    Endocrine  Multinodular goiter  Has been present and noted previously  Causing tracheal deviation but has been present for years, no stridor.    Endocrine consulted but at this point, would need surgical evaluation for which she is not a candidate for intervention at this time due to co morbidities and lack of symptoms.    GI  Oropharyngeal dysphagia  Suspect contributing to effusion,  Needs formal speech evaluation,  Consider NPO status for now.        Per palliative and primary care,  agree with ongoing discussions with family regarding overall poor projection of health over the past year and advanced care planning.  Would not recommend intubation or cardiopulmonary resuscitation if patient does not improve.    During each visit with Dr Lux this hospitalization, sitter has been at bedside have not discussed with her.          Laurie Ames MD  Pulmonology  [facility] - [facility] Surg ([place]-[facility])

## 2024-02-01 NOTE — H&P
Inpatient Radiology Pre-procedure Note    History of Present Illness:  Selma Lux is a 88 y.o. female who presents for US guided RIGHT thoracentesis.    Admission H&P reviewed.  Past Medical History:   Diagnosis Date    Acute cystitis without hematuria 2/27/2020    Acute hypoxemic respiratory failure 4/11/2018    Acute respiratory failure with hypoxia 3/2/2020    KERMIT (acute kidney injury) 3/13/2019    Altered mental status     Anemia     Arthritis     Bilateral hand pain 3/14/2018    Branch retinal vein occlusion, left eye 2/20/2015    Chronic bilateral low back pain without sciatica 3/23/2017    Chronic renal failure in pediatric patient, stage 3 (moderate) 4/15/2018    Chronic renal failure syndrome, stage 3 (moderate) 4/15/2018    Chronic systolic (congestive) heart failure 8/6/2021    Last Assessment & Plan:  Formatting of this note might be different from the original. 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 Hospitalization late 2021, w/ rehab to follow (no notes available) --12/13/2021-CT head revealed thin extra-axial hyperdense collection overlying the right cerebral convexity compatible with acute subdural hematoma, neurosurgery was consulted,  patient was noted with Keppra 500 mg b.i.d., apixaban was held -12/19/    Iron deficiency anemia secondary to inadequate dietary iron intake 6/24/2013    Joint pain     Keratoconjunctivitis sicca of both eyes not specified as Sjogren's 7/29/2016    Leiomyoma of uterus, unspecified 9/16/2014    Long QT interval 6/29/2016    Due to medication (plaquenil)     Macular edema 1/12/2015    Multinodular goiter 1/12/2016    Neuropathy 8/23/2017    Plaquenil causing adverse effect in therapeutic use 10/7/2016    Pneumococcal vaccine refused 8/17/2016    Pneumonia due to Streptococcus pneumoniae 4/5/2018    Poor nutrition 12/23/2018    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/2015    RA (rheumatoid arthritis) 12/13/2021    -managed by rhematology, since this is a duplicate problem list entry, will archive this     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     Past Surgical History:   Procedure Laterality Date    BREAST CYST EXCISION      CATARACT EXTRACTION      COLONOSCOPY N/A 9/29/2015    Procedure: COLONOSCOPY;  Surgeon: FIDELINA Sanchez MD;  Location: [address]);  Service: Endoscopy;  Laterality: N/A;    EYE SURGERY      INJECTION OF ANESTHETIC AGENT AROUND NERVE Left 4/19/2021    Procedure: BLOCK, NERVE, FEMORAL AND OBTURATOR;  Surgeon: Shivam Gonzalez MD;  Location: [facility] PAIN [facility];  Service: Pain Management;  Laterality: Left;  consent needed    JOINT REPLACEMENT      right knee    KNEE SURGERY Left 12/31/2013    TKR    left parotidectomy      mixed tumor    [place] EVAL,SWALLOW FUNCTION,CINE/VIDEO RECORD  6/5/2021         SALIVARY GLAND SURGERY       TONSILLECTOMY      UPPER GASTROINTESTINAL ENDOSCOPY         Review of Systems:   As documented in primary team H&P    Home Meds:   Prior to Admission medications    Medication Sig Start Date End Date Taking? Authorizing Provider   albuterol-ipratropium (DUO-NEB) 2.5 mg-0.5 mg/3 mL nebulizer solution Take 3 mLs by nebulization 2 (two) times daily as needed for Shortness of Breath. Rescue 8/3/23   Megan Chandra NP   amoxicillin-clavulanate 875-125mg (AUGMENTIN) 875-125 mg per tablet Take 1 tablet by mouth 2 (two) times daily. 1/23/24   Provider, Historical   apixaban (ELIQUIS) 5 mg Tab Take 1 tablet (5 mg total) by mouth 2 (two) times daily. 10/16/23 10/15/24  Karen Mcginnis MD   atorvastatin (LIPITOR) 40 MG tablet Take 1 tablet (40 mg total) by mouth every evening. 5/25/23   Farida Garcia MD   baclofen (LIORESAL) 5 mg Tab tablet Take 1 tablet (5 mg total) by mouth 3 (three) times daily. 10/16/23   Karen Mcginnis MD   cholecalciferol, vitamin D3, 125 mcg (5,000 unit) capsule Take 1 capsule (5,000 Units total) by mouth once daily. 7/24/23   Génesis Rosario MD   famotidine (PEPCID) 20 MG tablet Take 20 mg by mouth. 12/13/23   Provider, Historical   ferrous sulfate, dried (SLOW FE) 160 mg (50 mg iron) TbSR Take 1 tablet (160 mg total) by mouth every other day. 5/13/23   Nanette Ojeda,    fluticasone-salmeterol diskus inhaler 100-50 mcg Inhale 1 puff into the lungs 2 (two) times daily. Controller 8/3/23   Megan Chandra NP   gabapentin (NEURONTIN) 100 MG capsule Take 1 capsule (100 mg total) by mouth 3 (three) times daily. 10/16/23 10/15/24  Karen Mcginnis MD   gabapentin 5% baclofen 2% amitriptyline 2% topical cream Apply topically 3 (three) times daily. Apply to shoulders and neck for torticollis 10/16/23   Ramakrishna Gleason MD   hydroxychloroquine (PLAQUENIL) 200 mg tablet Take 1 tablet (200 mg total) by mouth once daily. 1/29/24 1/23/25  Lonnie Rouse MD   LIDOcaine (LIDODERM) 5 % Place 1  patch onto the skin once daily. Remove & Discard patch within 12 hours as needed for pain or as directed by MD Place patch onto lower back as needed 7/28/23   Génesis Rosario MD   magnesium oxide (MAG-OX) 400 mg (241.3 mg magnesium) tablet Take 400 mg by mouth once daily.    Provider, Historical   mycophenolate (CELLCEPT) 500 mg Tab Take 1 tablet (500 mg total) by mouth 2 (two) times daily. 1/30/23   Christopher Plaza NP   pantoprazole (PROTONIX) 40 MG tablet Take 1 tablet (40 mg total) by mouth once daily. 8/3/23 1/30/24  Megan Chandra NP   predniSONE (DELTASONE) 10 MG tablet Take 10 mg by mouth once daily.    Provider, Historical   QUEtiapine (SEROQUEL) 25 MG Tab Take 1 tablet (25 mg total) by mouth every evening. Patient may request 1/2 tablet rather than full 25mg nightly. 10/16/23 10/15/24  Ramakrishna Gleason MD   sulfamethoxazole-trimethoprim 800-160mg (BACTRIM DS) 800-160 mg Tab One tablet by mouth every Monday, Wednesday, Friday to prevent lung infection 4/6/23   Lonnie Rouse MD   thiamine 100 MG tablet Take 100 mg by mouth once daily.    Provider, Historical   calcium carbonate (TUMS) 200 mg calcium (500 mg) chewable tablet Take 2 tablets (1,000 mg total) by mouth once daily.  Patient not taking: No sig reported 1/3/22 6/23/22  Tim Castillo MD   lacosamide (VIMPAT) 10 mg/mL Soln oral solution Take 10 mLs (100 mg total) by mouth every 12 (twelve) hours.  Patient not taking: No sig reported 1/3/22 6/23/22  Tim Castillo MD     Scheduled Meds:    atorvastatin  40 mg Oral QHS    ceFEPime IV (PEDS and ADULTS)  2 g Intravenous Q12H    fluticasone furoate-vilanteroL  1 puff Inhalation Daily    gabapentin  100 mg Oral TID    guaiFENesin 100 mg/5 ml  200 mg Oral Q4H While awake    hydroxychloroquine  200 mg Oral Daily    pantoprazole  40 mg Oral Daily    predniSONE  10 mg Oral Daily    thiamine  100 mg Oral Daily    vancomycin (VANCOCIN) IV (PEDS and ADULTS)  750 mg Intravenous Q24H      Continuous Infusions:    heparin (porcine) in D5W Stopped (02/01/24 1004)     PRN Meds:acetaminophen, acetaminophen, albuterol-ipratropium, aluminum-magnesium hydroxide-simethicone, heparin (PORCINE), heparin (PORCINE), melatonin, naloxone, ondansetron, polyethylene glycol, QUEtiapine, simethicone, sodium chloride 0.9%, Pharmacy to dose Vancomycin consult **AND** vancomycin - pharmacy to dose  Anticoagulants/Antiplatelets: Heparin  (Heparin drip paused at least 30 mins prior to procedure)    Allergies: Review of patient's allergies indicates:  No Known Allergies  Sedation Hx: have not been any systemic reactions    Vitals:  Temp: 98 °F (36.7 °C) (02/01/24 0746)  Pulse: 104 (02/01/24 1102)  Resp: (!) 32 (02/01/24 0945)  BP: (!) 144/80 (02/01/24 0746)  SpO2: (!) 93 % (02/01/24 0945)     Physical Exam:  ASA: 2  Mallampati: n/a    General: no acute distress  Mental Status: altered, Consent obtained from both daughters.   HEENT: normocephalic, atraumatic  Chest: unlabored breathing  Heart: regular heart rate  Abdomen: nondistended  Extremity: moves all extremities    Plan: US guided RIGHT thoracentesis  Sedation Plan: Local    Lorelei Pedro PA-C  Interventional Radiology  [phone]

## 2024-02-01 NOTE — PROGRESS NOTES
BM and urinated / changed and cleaned pt / new pads and pulled up / resting waiting on transport / pt verbal but confused and easily agitated / light and stimulus decreased

## 2024-02-01 NOTE — ASSESSMENT & PLAN NOTE
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 Daughter states that at baseline, her mother has advancing dementia with waxing and waning mental status, which is unfortunately recently progressing. At bs today, pt is awake and oriented to person and place, answer simple questions, and on 3lpm NC.    Reason for Consult Per communication w/Dr. Castillo, [facility] med consult for assistance with GOC/ACP.    ACP/GOC 2/1/24- Left VM with daughter Madhavi yesterday to coordinate time for meeting today. Went to pt's bs this am, no one present, left card on sofa, and asked RN to message me if family comes. Went to room later in afternoon, RN said family came and they took my card, and said they will call me if needed. Pt did go for thoracentesis today, not completed due to "  A safe percutaneous window could not be identified". Pt also reportedly de-sated during positioning for thora. Currently, she remains at bs in room on NC and in no accute distress. I will follow up with family tomorrow.     1/31/24 I spoke with daughter (Madhavi) at length today. She has a very strong knowledge base about all of her mother's co-morbidities and the increasing complications managing them in the face of progressive dementia. She has been utilizing the Dementia clinic's resources for family support and is familiar with the progressive decline of dementia. She was amenable to speaking w/Palliaitve care, as someone in the clinic had recently suggested they speak w/palliative. She has noticed a decline in her mother over the last few months and is ready to have a meaningful GOC discussion. She states her mother is followed by multiple teams (rheumatology, pulm, primary, neuro, and endocrine) and she would like to make sure we are addressing all " aspects of care in order to have meaningful GOC. Dr. Castillo at  today and discussed pending thoracentesis and concern of increased size of thyroid mass. I have messaged pt's primary care provider (Dr. Rosario) as per family's request to alert her of admission. Madhavi will speak to her sister and will arrange time for us all to meet tomorrow to further discuss GOC and code status.    [facility] Pt has 2 daughters Madhavi and Rosy who are decision makers.     Symptoms   -Debility r/t CVA  -Decreased nutrition, r/t progressive inability to swallow safely    Recommendations  -Pulmonary to continue following for recs, per family.  - Endocrine requested per family for recs  -Will meet tomorrow w/ sisters.

## 2024-02-01 NOTE — PLAN OF CARE
SW attempted DPA at the bedside. DPA not completed at that time due to patient confused. SW reviewed w/ bedside Nurse. SW asked if family at the bedside today and nurse states that patient's daughters were at the bedside, no longer at the bedside.    SW will re-attempt w/ family via phone.                      GRANT Patel, LMSW  [facility]  Case Management  Ext. [phone]

## 2024-02-01 NOTE — PT/OT/SLP PROGRESS
"Speech Language Pathology Treatment    Patient Name:  Selma Lux   MRN:  [id number]  Admitting Diagnosis: Sepsis due to pneumonia    Recommendations:                 General Recommendations:  Dysphagia therapy  Diet recommendations:   Diet Level: Full liquids-thin liquids  Liquids by straw  *Meds presented whole 1 at a time with NECTAR thick liquids, slow rate, check oral cavity between presentations   Aspiration Precautions: 1 bite/sip at a time, Alternating bites/sips, Assistance with meals, Avoid talking while eating, Check for pocketing/oral residue, Eliminate distractions, Frequent oral care, HOB to 90 degrees, Meds whole 1 at a time, Small bites/sips, and Strict aspiration precautions   General Precautions: Standard, aspiration, fall  Communication strategies:  provide increased time to answer and go to room if call light pushed    Assessment:     Selma Lux is a 88 y.o. female with an SLP diagnosis of Dysphagia.  She presents with increased aspiration risk/decreased tolerance of PO.  Rec downgrade diet to full liquids with continued strict aspiration precautions as stated above.     Subjective     "Please don't crushed them." Re: medications.    Pain/Comfort:  Pain Rating 1:  (pt denied pain, reported "discomfort")  Pain Addressed 1: Distraction, Reposition, Nurse notified  Pain Rating Post-Intervention 1:  (Pt reported decreased discomfort following initial reposition though not sustained despite multiple repositioning attempts by SLP)    Respiratory Status: Nasal cannula    Objective:     Has the patient been evaluated by SLP for swallowing?   Yes  Keep patient NPO? No     Pt seen bedside, more improved alertness and participation in conversation today given increased time.  Vocal quality was clear.  Some frothy like secretions noted in posterior oral cavity with hack like cough when SLP entered room.  Given cues to expectorate thick secretions, pt with noticeably decreased coughing.  Thin " liquids presented via straw with mildly delayed swallow initiation though no overt s/s aspiration.  Continued extraneous head and lip movements today though less frequent compared to previous session.  Nurse at bedside providing medications whole, one at at time.  Delayed oral transit though adequate clearance of whole medications.  Cough response noted x1.  Remainder of medications given with nectar thick liquid via cup.  No further overt s/s aspiration.  Following medications, pt accepted puree via tsp x2 with mild anterior loss and slow oral transit requiring min cues for completion.  No overt s/s aspiration.  Additional thin liquid via straw and nectar thick liquid via cup trials presented with no further overt s/s aspiration.  Vocal quality remained clear.  Education provided re: role of SLP, diet recs, swallow precs, aspiration risk, h/o dysphagia, s/s aspiration, and POC.  Pt able to verbalize some understanding/agreement though will require reinforcement.         Goals:   Multidisciplinary Problems       SLP Goals          Problem: SLP    Goal Priority Disciplines Outcome   SLP Goal     SLP Ongoing, Progressing   Description: Speech Language Pathology Goals  Goals expected to be met by 2/13  1. Pt will participate in ongoing assessment of swallow.                                  Plan:     Patient to be seen:  3 x/week   Plan of Care expires:  02/29/24  Plan of Care reviewed with:  patient   SLP Follow-Up:  Yes       Discharge recommendations:   (tbd)   Barriers to Discharge:  Level of Skilled Assistance Needed      Time Tracking:     SLP Treatment Date:   02/01/24  Speech Start Time:  0820  Speech Stop Time:  0842     Speech Total Time (min):  22 min    Billable Minutes: Treatment Swallowing Dysfunction 14 and Self Care/Home Management Training 8    02/01/2024

## 2024-02-01 NOTE — MEDICAL/APP STUDENT
[facility] ([place] [address])  Pulmonology  Progress Note     Patient Name: Selma Lux  MRN: [id number]  Admission Date: 2024  Hospital Length of Stay: 3 days  Code Status: Full Code  Attending Provider: Cliff Zacarias MD  Primary Care Provider: Génesis Rosario MD   Principal Problem: Sepsis due to pneumonia    Subjective:    Interval history:   Patient in IR for thoracentesis while rounding, daughter at bedsite.      Objective:   Last 24 Hour Vital Signs:  BP  Min: 124/61  Max: 162/69  Temp  Av.8 °F (36.6 °C)  Min: 97.1 °F (36.2 °C)  Max: 98.4 °F (36.9 °C)  Pulse  Av.9  Min: 90  Max: 104  Resp  Av.9  Min: 16  Max: 22  SpO2  Av.3 %  Min: 84 %  Max: 98 %    I/O last 3 completed shifts:  In: 460 [P.O.:360; IV Piggyback:100]  Out: 1300 [Urine:1300]    Laboratory:  Trended Lab Data:  Recent Labs     24  1351 24  1942 24  0231 24  0740 24  1725 24  0042 24  0445   WBC 11.58 10.78 12.65  12.65   < > 8.57 10.77 13.15*   HGB 8.4* 8.0* 7.7*  7.7*   < > 8.4* 7.5* 8.1*   HCT 27.0* 26.1* 25.3*  25.3*   < > 27.4* 24.3* 26.0*   * 108* 109*  109*   < > 117* 118* 124*   *  --  134*  --   --   --  139   K 3.8  --  3.5  --   --   --  4.2     --  105  --   --   --  112*   CO2 19*  --  19*  --   --   --  18*   BUN 18  --  17  --   --   --  13   CREATININE 0.9  --  0.9  --   --   --  0.8     --  73  --   --   --  81   BILITOT 1.6*  --  1.1*  --   --   --  1.4*   AST 11  --  10  --   --   --  10   ALT 10  --  7*  --   --   --  8*   ALKPHOS 55  --  53*  --   --   --  56   CALCIUM 8.6*  --  8.5*  --   --   --  8.6*   ALBUMIN 2.6*  --  2.5*  --   --   --  2.6*   PROT 5.3*  --  5.2*  --   --   --  5.4*   MG 1.5*  --  1.6  --   --   --  1.6   INR  --  1.4*  --   --   --   --   --     < > = values in this interval not displayed.     Microbiology:  Microbiology Results (last 7 days)       Procedure Component Value Units  Date/Time    Blood culture x two cultures. Draw prior to antibiotics. [[id number]] Collected: 01/29/24 1843    Order Status: Completed Specimen: Blood from Peripheral, Forearm, Right Updated: 01/31/24 2012     Blood Culture, Routine No Growth to date      No Growth to date      No Growth to date    Narrative:      Aerobic and anaerobic    Blood culture x two cultures. Draw prior to antibiotics. [[id number]] Collected: 01/29/24 1835    Order Status: Completed Specimen: Blood from Peripheral, Hand, Right Updated: 01/31/24 2012     Blood Culture, Routine No Growth to date      No Growth to date      No Growth to date    Narrative:      Aerobic and anaerobic    Urine culture [[id number]]  (Abnormal) Collected: 01/30/24 0334    Order Status: Completed Specimen: Urine Updated: 01/31/24 0811     Urine Culture, Routine GRAM NEGATIVE CARLOS  50,000 - 99,999 cfu/ml  Identification and susceptibility pending      Narrative:      Specimen Source->Urine    Culture, Respiratory with Gram Stain [[id number]]     Order Status: No result Specimen: Respiratory     Aerobic culture [[id number]]     Order Status: No result Specimen: Pleural Fluid     Culture, Anaerobic [[id number]]     Order Status: No result Specimen: Pleural Fluid     Gram stain [[id number]]     Order Status: No result Specimen: Pleural Fluid     AFB Culture & Smear [[id number]]     Order Status: No result Specimen: Pleural Fluid     Fungus culture [[id number]]     Order Status: No result Specimen: Pleural Fluid     Culture, MRSA [[id number]]     Order Status: No result Specimen: MRSA source from Nares, Right     Influenza A & B by Molecular [[id number]] Collected: 01/29/24 1838    Order Status: Completed Specimen: Nasopharyngeal Swab Updated: 01/29/24 1931     Influenza A, Molecular Negative     Influenza B, Molecular Negative     Flu A & B Source NP          Current Medications:     Infusions:   heparin (porcine) in D5W 14 Units/kg/hr (01/31/24 1845)        Scheduled:    atorvastatin  40 mg Oral QHS    ceFEPime IV (PEDS and ADULTS)  2 g Intravenous Q12H    fluticasone furoate-vilanteroL  1 puff Inhalation Daily    gabapentin  100 mg Oral TID    guaiFENesin 100 mg/5 ml  200 mg Oral Q4H While awake    hydroxychloroquine  200 mg Oral Daily    pantoprazole  40 mg Oral Daily    predniSONE  10 mg Oral Daily    thiamine  100 mg Oral Daily    vancomycin (VANCOCIN) IV (PEDS and ADULTS)  750 mg Intravenous Q24H        PRN:  acetaminophen, acetaminophen, albuterol-ipratropium, aluminum-magnesium hydroxide-simethicone, heparin (PORCINE), heparin (PORCINE), melatonin, naloxone, ondansetron, polyethylene glycol, QUEtiapine, simethicone, sodium chloride 0.9%, Pharmacy to dose Vancomycin consult **AND** vancomycin - pharmacy to dose     Assessment and Pan:   Neuro  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      Still being fed food with significant dysarthria and likely dysphagia, suspect there is a large component of aspiration that will continue if being fed when not fully alert, sitting up, etc.  Speech therapy was consulted, appreciate recs. Recommendations were given to daughter and caregiver.     Patient has been disoriented since admission, current mental status is her baseline as per caregiver.      Pulmonary  Pleural effusion  Thoracentesis performed today by IR, pending fluid results to determine further management.   Will continue to follow up     Sepsis due to pneumonia   Patient receiving management for PNA, clinically stable for now, continue antibiotics.   Blood and airway culture have shown no growth yet.     UTI  Urine culture growing GNR, identification and susceptibility pending. Hard to evaluate for symptoms, septic at admission, WBC uptrending from yesterday, patient receiving antibiotics for PNA that cover for GN urinary rods.    Endocrine  Multinodular goiter  Has  been present and noted previously  Causing tracheal deviation but has been present for years, no stridor.    Does not require any additional inpatient evaluation for now as per endocrine.      ACP  Had discussion with daughter, who is her medical POA, about ACP, she has adequate insights of her mother's medical status. 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 Also, mentions she will discuss it with her sister. Palliative care following, appreciate recs.     Richi Daley  Medical student

## 2024-02-01 NOTE — ASSESSMENT & PLAN NOTE
Has been present and noted previously  Causing tracheal deviation but has been present for years, no stridor.    Endocrine consulted but at this point, would need surgical evaluation for which she is not a candidate for intervention at this time due to co morbidities and lack of symptoms.

## 2024-02-01 NOTE — PROGRESS NOTES
Pharmacokinetic Assessment Follow Up: IV Vancomycin    Vancomycin serum concentration assessment(s):    The trough level was drawn correctly and can be used to guide therapy at this time. The measurement is within the desired definitive target range of 10 to 20 mcg/mL.    Vancomycin Regimen Plan:    Change regimen to Vancomycin 750 mg IV every 24 hours with next serum trough concentration measured at 2200 prior to 3rd dose if new regimen on 2/2    Drug levels (last 3 results):  Recent Labs   Lab Result Units 01/31/24  2116   Vancomycin-Trough ug/mL 19.8       Pharmacy will continue to follow and monitor vancomycin.    Please contact pharmacy at extension [phone] for questions regarding this assessment.    Thank you for the consult,   Dorothy Steven       Patient brief summary:  Selma Lux is a 88 y.o. female initiated on antimicrobial therapy with IV Vancomycin for treatment of lower respiratory infection      Drug Allergies:   Review of patient's allergies indicates:  No Known Allergies    Actual Body Weight:   71.7 kg     Renal Function:   Estimated Creatinine Clearance: 43.6 mL/min (based on SCr of 0.9 mg/dL).,     Dialysis Method (if applicable):  N/A    CBC (last 72 hours):  Recent Labs   Lab Result Units 01/29/24  1836 01/30/24  1351 01/30/24  1942 01/31/24  0231 01/31/24  0740 01/31/24  1131 01/31/24  1725   WBC K/uL 13.71* 11.58 10.78 12.65  12.65 14.88* 14.54* 8.57   Hemoglobin g/dL 9.4* 8.4* 8.0* 7.7*  7.7* 8.3* 8.4* 8.4*   Hematocrit % 31.1* 27.0* 26.1* 25.3*  25.3* 26.9* 27.2* 27.4*   Platelets K/uL 136* 120* 108* 109*  109* 104* 112* 117*   Gran % % 56.2 69.2 62.4 53.3  53.3 51.5 51.3 75.2*   Lymph % % 10.0* 5.3* 10.3* 14.5*  14.5* 12.8* 10.3* 6.7*   Mono % % 31.0* 23.3* 25.0* 29.5*  29.5* 32.5* 34.9* 15.9*   Eosinophil % % 1.3 1.0 0.6 1.7  1.7 1.8 2.3 0.7   Basophil % % 0.3 0.2 0.3 0.1  0.1 0.3 0.2 0.1   Differential Method  Automated Automated Automated Automated  Automated Automated  Automated Automated       Metabolic Panel (last 72 hours):  Recent Labs   Lab Result Units 01/29/24  1836 01/30/24  0334 01/30/24  1351 01/31/24  0231   Sodium mmol/L 140  --  135* 134*   Potassium mmol/L 4.9  --  3.8 3.5   Chloride mmol/L 112*  --  109 105   CO2 mmol/L 14*  --  19* 19*   Glucose mg/dL 55*  --  108 73   Glucose, UA   --  Negative  --   --    BUN mg/dL 23  --  18 17   Creatinine mg/dL 0.9  --  0.9 0.9   Albumin g/dL 2.9*  --  2.6* 2.5*   Total Bilirubin mg/dL 1.6*  --  1.6* 1.1*   Alkaline Phosphatase U/L 57  --  55 53*   AST U/L 18  --  11 10   ALT U/L 12  --  10 7*   Magnesium mg/dL  --   --  1.5* 1.6       Vancomycin Administrations:  vancomycin given in the last 96 hours                     vancomycin 1,250 mg in dextrose 5 % (D5W) 250 mL IVPB (Vial-Mate) (mg) 1,250 mg New Bag 01/30/24 2259    vancomycin 1.75 g in 5 % dextrose 500 mL IVPB (mg) 1,750 mg New Bag 01/29/24 2155                    Microbiologic Results:  Microbiology Results (last 7 days)       Procedure Component Value Units Date/Time    Blood culture x two cultures. Draw prior to antibiotics. [[id number]] Collected: 01/29/24 1843    Order Status: Completed Specimen: Blood from Peripheral, Forearm, Right Updated: 01/31/24 2012     Blood Culture, Routine No Growth to date      No Growth to date      No Growth to date    Narrative:      Aerobic and anaerobic    Blood culture x two cultures. Draw prior to antibiotics. [[id number]] Collected: 01/29/24 1835    Order Status: Completed Specimen: Blood from Peripheral, Hand, Right Updated: 01/31/24 2012     Blood Culture, Routine No Growth to date      No Growth to date      No Growth to date    Narrative:      Aerobic and anaerobic    Urine culture [[id number]]  (Abnormal) Collected: 01/30/24 0334    Order Status: Completed Specimen: Urine Updated: 01/31/24 0811     Urine Culture, Routine GRAM NEGATIVE CARLOS  50,000 - 99,999 cfu/ml  Identification and susceptibility pending      Narrative:       Specimen Source->Urine    Culture, Respiratory with Gram Stain [[id number]]     Order Status: No result Specimen: Respiratory     Aerobic culture [[id number]]     Order Status: No result Specimen: Pleural Fluid     Culture, Anaerobic [[id number]]     Order Status: No result Specimen: Pleural Fluid     Gram stain [[id number]]     Order Status: No result Specimen: Pleural Fluid     AFB Culture & Smear [[id number]]     Order Status: No result Specimen: Pleural Fluid     Fungus culture [[id number]]     Order Status: No result Specimen: Pleural Fluid     Culture, MRSA [[id number]]     Order Status: No result Specimen: MRSA source from Nares, Right     Influenza A & B by Molecular [[id number]] Collected: 01/29/24 1838    Order Status: Completed Specimen: Nasopharyngeal Swab Updated: 01/29/24 1931     Influenza A, Molecular Negative     Influenza B, Molecular Negative     Flu A & B Source NP

## 2024-02-01 NOTE — ASSESSMENT & PLAN NOTE
Favors to sleep on right side.     Still being fed food with significant dysarthria likely dysphagia.  I suspect there is a large component of aspiration that will continue if being fed when not fully alert, sitting up, etc.      Needs a formal speech evaluation

## 2024-02-01 NOTE — PROGRESS NOTES
[facility] ([address] Medicine  Progress Note    Patient Name: Selma Lux  MRN: [id number]  Patient Class: IP- Inpatient   Admission Date: 1/29/2024  Length of Stay: 3 days  Attending Physician: Cliff Zacarias*  Primary Care Provider: Génesis Rosario MD        Subjective:     Principal Problem:Sepsis due to pneumonia        HPI:  Selma Lux is a 88 y.o. female with RA with ILD on Cellcept and prednisone,  history of CVA with resultant vascular dementia, L hemiparesis, and debility/bedbound status, AFib, HTN, HFpEF who presents today with hypoxia.     History is provided by her daughter at bedside as she is unable to provide due to her dementia.     She states that at baseline, her mother has advancing dementia with waxing and waning mental status, which is unfortunately recently progressing. She is bedbound and favors lying to her R side given her L hemiparesis. Last week, she developed congestion and mucous production, so her PCP obtained a CXR which showed PNA. She has been on antibiotics since then, and was placed on oxygen as well.  Today, she was taken to a Rheumatology appointment, and her daughter noted that she appeared more fatigued than normal. After she was brought back to her senior care home, she reportedly was more tachypnic and hypoxic, therefore she was brought here for evaluation.     She was initially tachycardic, tachypneic, and had large pleural effusion/pneumonia in the right on chest x-ray along with lactic acidosis and leukocytosis.  She was given vancomycin and Zosyn, and hospital medicine consulted for admission.    Per daughter at bedside, she is currently around her baseline mental status, however this waxes and wanes.  Her p.o. intake has been poor recently.    Overview/Hospital Course:  1/30 UA + . UC pending.  increased tachypnea and hypoxia.  Recently diagnosed with pneumonia last week and started on oral antibiotics along with  oxygen.  sats 93% on 2LNC. pneumonia vs  chronic interstitial lung disease from rheumatoid arthritis.   Large pleural effusion noted on chest x-ray, CT chest -  Moderate volume right-sided pleural effusion with adjacent compressive atelectasis.  Patchy ground-glass opacities in the aerated lungs suggestive of underlying infectious/inflammatory process. Numerous new nodular opacities scattered within the aerated portions of the lungs, with leading differential diagnostic considerations of infectious/inflammatory process versus malignancy. .  Enlarged multinodular thyroid gland with complex hypoattenuating substernal mass thought to be of thyroid origin.  This mass was present previously but now causes more mass effect, with new narrowing of the right mainstem bronchus.  Malignancy is not excluded. Pulmonology consulted, continue cefepime and vancomycin. Low suspicion for PE as on eliquis. SLP consulted  -recs regular diet. Hold home CellCept .  IR consulted for diagnostic thoracentesis . started on heparin drip .  Goiter continues to enlarge and may one day compromise her airway. palliative care consulted for discussion with daughters.  1/31 Eliquis on hold. Heparin drip not started overnight due to concerns for bloody stains on gown. Per staff no witnessed hemoptysis. Hb at 7.6-->8.3 . PTT elevated at baseline 37. started on heparin drip. sats 96% on 3LNC. CBC q 6h.    sputum cultures. pulmonology f/u . Endocrinology consulted for multinodular goiter.  Discussed with the daughter at the bedside. MARI - GNR. Identification and susceptibility pending.  SLP to follow up     Interval History: No acute events. Pt to IR today for thoracentesis - reportedly small volume effusion and no safe window. Afebrile  Remains on 4 L NC    Review of Systems  Objective:     Vital Signs (Most Recent):  Temp: 98 °F (36.7 °C) (02/01/24 0746)  Pulse: 96 (02/01/24 1233)  Resp: (!) 24 (02/01/24 1110)  BP: 129/77 (02/01/24 1233)  SpO2: 99 %  (02/01/24 1233) Vital Signs (24h Range):  Temp:  [97.1 °F (36.2 °C)-98 °F (36.7 °C)] 98 °F (36.7 °C)  Pulse:  [] 96  Resp:  [16-32] 24  SpO2:  [84 %-100 %] 99 %  BP: (128-162)/(58-81) 129/77     Weight: 71.7 kg (158 lb)  Body mass index is 27.12 kg/m².    Intake/Output Summary (Last 24 hours) at 2/1/2024 1441  Last data filed at 2/1/2024 1127  Gross per 24 hour   Intake 360 ml   Output 1001 ml   Net -641 ml         Physical Exam  Constitutional:       General: She is not in acute distress.     Appearance: She is well-developed. She is not ill-appearing or diaphoretic.   Neck:      Vascular: No JVD.   Cardiovascular:      Rate and Rhythm: Normal rate. Rhythm irregular.   Pulmonary:      Effort: Pulmonary effort is normal. No respiratory distress.      Breath sounds: No wheezing.      Comments: Decreased breath sounds throughout the right lung  Abdominal:      General: There is no distension.      Tenderness: There is no abdominal tenderness.   Musculoskeletal:      Right lower leg: No edema.      Left lower leg: No edema.   Skin:     Coloration: Skin is not jaundiced or pale.   Neurological:      Mental Status: She is alert.      Motor: Weakness (Left hemiparesis) present. No abnormal muscle tone.      Comments: Oriented only to person, somewhat follows commands   Psychiatric:         Cognition and Memory: Cognition is impaired. Memory is impaired.      Comments: Inattentive             Significant Labs: All pertinent labs within the past 24 hours have been reviewed.    Significant Imaging: I have reviewed all pertinent imaging results/findings within the past 24 hours.    Assessment/Plan:      * Sepsis due to pneumonia  Meets sepsis criteria likely secondary to pneumonia.  Initial lactic acidosis improving with IV fluids.  Blood cultures collected; we will continue vancomycin and cefepime for now given concern for focal pneumonia, and we will follow.  Tailor antibiotics as appropriate.  Obtain CT chest as  below.    This patient does have evidence of infective focus  My overall impression is sepsis.  Source: Respiratory  Antibiotics given-   Antibiotics (72h ago, onward)      Start     Stop Route Frequency Ordered    01/30/24 2200  vancomycin 1,250 mg in dextrose 5 % (D5W) 250 mL IVPB (Vial-Mate)         -- IV Every 24 hours (non-standard times) 01/29/24 2245    01/30/24 0600  ceFEPIme (MAXIPIME) 2 g in dextrose 5 % in water (D5W) 100 mL IVPB (MB+)         -- IV Every 12 hours (non-standard times) 01/29/24 2235 01/29/24 2335  vancomycin - pharmacy to dose  (vancomycin IVPB (PEDS and ADULTS))        See Hyperspace for full Linked Orders Report.    -- IV pharmacy to manage frequency 01/29/24 2235          Latest lactate reviewed-  Recent Labs   Lab 01/29/24 2157   POCLAC 1.54       Organ dysfunction indicated by Acute respiratory failure    Fluid challenge Not needed - patient is not hypotensive      Post- resuscitation assessment No - Post resuscitation assessment not needed       Will Not start Pressors- Levophed for MAP of 65    Cough with hemoptysis  Appears resolved  On heparin gtt      Pleural effusion  Large pleural effusion noted on chest x-ray, which may be secondary to pneumonia.  No overt evidence of volume overload to suggest heart failure.  We will attempt to obtain CT chest for better characterization; may require thoracentesis.  Continue antibiotics.  1/30   Recently diagnosed with pneumonia last week and started on oral antibiotics along with oxygen.  sats 93% on 2LNC. pneumonia vs  chronic interstitial lung disease from rheumatoid arthritis.   Large pleural effusion noted on chest x-ray, CT chest -  Moderate volume right-sided pleural effusion with adjacent compressive atelectasis.  Patchy ground-glass opacities in the aerated lungs suggestive of underlying infectious/inflammatory process. Numerous new nodular opacities scattered within the aerated portions of the lungs, with leading differential  diagnostic considerations of infectious/inflammatory process versus malignancy. .  Enlarged multinodular thyroid gland with complex hypoattenuating substernal mass thought to be of thyroid origin.  This mass was present previously but now causes more mass effect, with new narrowing of the right mainstem bronchus.  Malignancy is not excluded. Pulmonology consulted, continue cefepime and vancomycin. IR consulted for diagnostic thoracentesis .    1/31 Eliquis on hold. Heparin drip not started overnight due to concerns for bloody stains on gown. Per staff no witnessed hemoptysis. Hb at 7.6-->8.3 . PTT elevated at baseline 37. started on heparin drip. sats 96% on 3LNC. CBC q 6h.    sputum cultures. pulmonology f/u .    Anemia    Patient's with Normocytic anemia.. Hemoglobin stable. Etiology likely due to chronic disease .  Current CBC reviewed-    Recent Labs   Lab 01/31/24  0231 01/31/24  0740 01/31/24  1131   HGB 7.7*  7.7* 8.3* 8.4*         Component Value Date/Time    MCV 81 (L) 01/31/2024 1131    RDW 18.5 (H) 01/31/2024 1131    IRON 14 (L) 10/13/2022 0349    FERRITIN 248 10/13/2022 0349    FOLATE 5.2 09/02/2023 1015    [id number] 1328 (H) 09/02/2023 1015     Monitor CBC and transfuse if H/H drops below 7/21.    1/31Eliquis on hold. Heparin drip not started overnight due to concern for coughing up blood. Hb at 7.6. PTT elevated at baseline 37. sats 96% on 3LNC    COPD without exacerbation  Currently without evidence of acute hypercapnic respiratory failure, and no wheezing on exam.  We will continue home inhalers.  1/30  sats 93% on 2LNC.    Heart failure with preserved ejection fraction  No evidence of acute exacerbation.  We will monitor volume status.  Patient admitted with signs and symptoms of CHF exacerbation. Continue with IV lasix.    Results for orders placed or performed during the hospital encounter of 01/29/24   Brain natriuretic peptide   Result Value Ref Range     (H) 0 - 99 pg/mL    serial  troponins.  Cardiac Enzymes trend  Recent Labs   Lab 01/29/24  1836   TROPONINI 0.018     Results for orders placed during the hospital encounter of 10/09/23    Echo    Interpretation Summary    Left Ventricle: The left ventricle is normal in size. Ventricular mass is normal. Normal wall thickness. There is concentric remodeling. Normal wall motion. There is normal systolic function. Ejection fraction by visual approximation is 65%. Grade II diastolic dysfunction.    Left Atrium: Left atrium is severely dilated.    Right Ventricle: Normal right ventricular cavity size. Wall thickness is normal. Right ventricle wall motion  is normal. Systolic function is normal.    Aortic Valve: The aortic valve is a trileaflet valve. There is moderate aortic valve sclerosis. There is mild annular calcification present. There is restricted motion of the left coronary cusp.    Mitral Valve: There is bileaflet sclerosis. Moderately calcified subvalvular apparatus. Mildly restricted motion. There is mild to moderate regurgitation.    Tricuspid Valve: There is mild regurgitation.    Pulmonary Artery: The estimated pulmonary artery systolic pressure is 42 mmHg.    IVC/SVC: Normal venous pressure at 3 mmHg.         Vascular Dementia  Per daughter at bedside, recently with worsening dementia with waxing and waning mental status.  We will continue home Seroquel nightly along with delirium precautions.  1/30 CT head -No acute intracranial process.  Significant motion artifact may diminish the sensitivity. Involutional changes with chronic microvascular ischemic changes.  Small remote lacunar infarct of the right cerebellum and right corona radiata.    Immunocompromised state due to drug therapy  1/30 continue plaquenil.  Hold home CellCept .       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  Continue supportive care and frequent turns.      Multinodular  goiter  1/30  CT chest - .  Enlarged multinodular thyroid gland with complex hypoattenuating substernal mass thought to be of thyroid origin.  This mass was present previously but now causes more mass effect, with new narrowing of the right mainstem bronchus.  Malignancy is not excluded. Pulmonology consulted, TSH WNL .  Goiter continues to enlarge and may one day compromise her airway. palliative care consulted for discussion with daughters.  1/31   Endocrinology consulted for multinodular goiter.  Discussed with the daughter at the bedside.      UTI (urinary tract infection)  1/30 UA + . UC pending. continue cefepime  1/31 UC - GNR. Identification and susceptibility pending.       Thrombocytopenia  Patient was found to have thrombocytopenia, monitor .  Recent Labs   Lab 01/29/24  1836   *         AF (paroxysmal atrial fibrillation)  Currently rate controlled.   1/31 Eliquis on hold. Heparin drip not started overnight due to concerns for bloody stains on gown. Per staff no witnessed hemoptysis. Hb at 7.6-->8.3 . PTT elevated at baseline 37. started on heparin drip.     Oropharyngeal dysphagia  Noted in the past, however not on specific dysphagia diet per daughter.  Her right sided pneumonia may be secondary to aspiration as she is at high risk for this.  May consider speech therapy consultation.  1/30 SLP consulted to rule out aspiration        Acute respiratory failure with hypoxia  Presents from her [facility] center with increased tachypnea and hypoxia.  Recently diagnosed with pneumonia last week and started on oral antibiotics along with oxygen.  PO2 is decreased on ABG, however appears to be chronically decreased.  Suspect pneumonia on chronic interstitial lung disease from rheumatoid arthritis.  We will continue oxygen to maintain sats 88% or greater.  Low suspicion for PE given chronic Eliquis use.  1/30  Recently diagnosed with pneumonia last week and started on oral antibiotics along with oxygen.   sats 93% on 2LNC. pneumonia vs  chronic interstitial lung disease from rheumatoid arthritis.   Large pleural effusion noted on chest x-ray, CT chest -  Moderate volume right-sided pleural effusion with adjacent compressive atelectasis.     Rheumatoid arthritis of multiple sites  Continue home Plaquenil and prednisone.  Hold home CellCept given acute infection.        VTE Risk Mitigation (From admission, onward)           Ordered     heparin 25,000 units in dextrose 5% (100 units/ml) IV bolus from bag - ADDITIONAL PRN BOLUS - 60 units/kg  As needed (PRN)        Question:  Heparin Infusion Adjustment (DO NOT MODIFY ANSWER)  Answer:  \\[email or web address]\epic\Images\Pharmacy\HeparinInfusions\heparin LOW INTENSITY nomogram for [facility] [id number].pdf    01/30/24 1844     heparin 25,000 units in dextrose 5% (100 units/ml) IV bolus from bag - ADDITIONAL PRN BOLUS - 30 units/kg  As needed (PRN)        Question:  Heparin Infusion Adjustment (DO NOT MODIFY ANSWER)  Answer:  \[employer]\epic\Images\Pharmacy\HeparinInfusions\heparin LOW INTENSITY nomogram for [facility] [id number].pdf    01/30/24 1844     heparin 25,000 units in dextrose 5% 250 mL (100 units/mL) infusion LOW INTENSITY nomogram - [facility]  Continuous        Question:  Begin at (units/kg/hr)  Answer:  12 01/30/24 1844                    Discharge Planning   LETICIA: 2/2/2024     Code Status: Full Code   Is the patient medically ready for discharge?:     Reason for patient still in hospital (select all that apply): Patient trending condition, Treatment, and Consult recommendations           Cliff Zacarias MD  Department of Hospital Medicine   [facility] Surg ([address])

## 2024-02-01 NOTE — SUBJECTIVE & OBJECTIVE
Interval History: No acute events. Pt to IR today for thoracentesis - reportedly small volume effusion and no safe window. Afebrile  Remains on 4 L NC    Review of Systems  Objective:     Vital Signs (Most Recent):  Temp: 98 °F (36.7 °C) (02/01/24 0746)  Pulse: 96 (02/01/24 1233)  Resp: (!) 24 (02/01/24 1110)  BP: 129/77 (02/01/24 1233)  SpO2: 99 % (02/01/24 1233) Vital Signs (24h Range):  Temp:  [97.1 °F (36.2 °C)-98 °F (36.7 °C)] 98 °F (36.7 °C)  Pulse:  [] 96  Resp:  [16-32] 24  SpO2:  [84 %-100 %] 99 %  BP: (128-162)/(58-81) 129/77     Weight: 71.7 kg (158 lb)  Body mass index is 27.12 kg/m².    Intake/Output Summary (Last 24 hours) at 2/1/2024 1441  Last data filed at 2/1/2024 1127  Gross per 24 hour   Intake 360 ml   Output 1001 ml   Net -641 ml         Physical Exam  Constitutional:       General: She is not in acute distress.     Appearance: She is well-developed. She is not ill-appearing or diaphoretic.   Neck:      Vascular: No JVD.   Cardiovascular:      Rate and Rhythm: Normal rate. Rhythm irregular.   Pulmonary:      Effort: Pulmonary effort is normal. No respiratory distress.      Breath sounds: No wheezing.      Comments: Decreased breath sounds throughout the right lung  Abdominal:      General: There is no distension.      Tenderness: There is no abdominal tenderness.   Musculoskeletal:      Right lower leg: No edema.      Left lower leg: No edema.   Skin:     Coloration: Skin is not jaundiced or pale.   Neurological:      Mental Status: She is alert.      Motor: Weakness (Left hemiparesis) present. No abnormal muscle tone.      Comments: Oriented only to person, somewhat follows commands   Psychiatric:         Cognition and Memory: Cognition is impaired. Memory is impaired.      Comments: Inattentive             Significant Labs: All pertinent labs within the past 24 hours have been reviewed.    Significant Imaging: I have reviewed all pertinent imaging results/findings within the past 24  hours.

## 2024-02-01 NOTE — NURSING
Spoke with Dr. Ponce regarding patient's confusion and agitation. Restraints ordered by MD. Restraints not placed on patient at this time. Alternative comfort measures provided. Will continue to monitor.

## 2024-02-01 NOTE — SUBJECTIVE & OBJECTIVE
Interval History: Patient remains without significant respiratory distress.  On minimal supplemental oxygen.  Hemodynamically stable.  IR has been consulted for thoracentesis.       Objective:     Vital Signs (Most Recent):  Temp: 97.7 °F (36.5 °C) (01/31/24 1942)  Pulse: 94 (01/31/24 1942)  Resp: 16 (01/31/24 1942)  BP: (!) 162/69 (01/31/24 1942)  SpO2: 98 % (01/31/24 1942) Vital Signs (24h Range):  Temp:  [97.1 °F (36.2 °C)-98.4 °F (36.9 °C)] 97.7 °F (36.5 °C)  Pulse:  [] 94  Resp:  [16-22] 16  SpO2:  [90 %-98 %] 98 %  BP: (118-162)/(53-69) 162/69     Weight: 71.7 kg (158 lb)  Body mass index is 27.12 kg/m².      Intake/Output Summary (Last 24 hours) at 1/31/2024 1953  Last data filed at 1/31/2024 0942  Gross per 24 hour   Intake 120 ml   Output 500 ml   Net -380 ml        Physical Exam  Constitutional:       Comments: Oriented to person   HENT:      Head: Normocephalic.   Cardiovascular:      Rate and Rhythm: Normal rate and regular rhythm.   Pulmonary:      Effort: Pulmonary effort is normal.      Breath sounds: Normal breath sounds.   Musculoskeletal:      Cervical back: Normal range of motion and neck supple.   Skin:     General: Skin is warm and dry.   Neurological:      General: No focal deficit present.      Mental Status: She is alert.           Review of Systems    Vents:       Lines/Drains/Airways       Peripheral Intravenous Line  Duration                  Peripheral IV - Single Lumen 01/29/24 1832 20 G Anterior;Right Hand 2 days         Peripheral IV - Single Lumen 01/31/24 1726 22 G Anterior;Proximal;Right Upper Arm <1 day                    Significant Labs:    CBC/Anemia Profile:  Recent Labs   Lab 01/31/24  0740 01/31/24  1131 01/31/24  1725   WBC 14.88* 14.54* 8.57   HGB 8.3* 8.4* 8.4*   HCT 26.9* 27.2* 27.4*   * 112* 117*   MCV 81* 81* 80*   RDW 18.4* 18.5* 18.5*        Chemistries:  Recent Labs   Lab 01/30/24  1351 01/31/24  0231   * 134*   K 3.8 3.5    105   CO2 19* 19*    BUN 18 17   CREATININE 0.9 0.9   CALCIUM 8.6* 8.5*   ALBUMIN 2.6* 2.5*   PROT 5.3* 5.2*   BILITOT 1.6* 1.1*   ALKPHOS 55 53*   ALT 10 7*   AST 11 10   MG 1.5* 1.6       All pertinent labs within the past 24 hours have been reviewed.  Leucocytosis improving.     Significant Imaging:  I have reviewed all pertinent imaging results/findings within the past 24 hours.  No new imagin

## 2024-02-01 NOTE — PROGRESS NOTES
Initial US imaging of the back was performed. Small volume of pleural fluid was noted on the right. A safe percutaneous window could not be identified. Thoracentesis not performed.     Patient had difficulty tolerating positioning/staying still for the procedure. O2 stats dropped in the 70s and patient defecating when rolled. I recommend if an additional attempt is made in the future it is requested with sedation.     Lorelei Pedro PA-C  Interventional Radiology  [phone]

## 2024-02-02 PROBLEM — R04.2 COUGH WITH HEMOPTYSIS: Status: RESOLVED | Noted: 2024-01-01 | Resolved: 2024-01-01

## 2024-02-02 NOTE — PROGRESS NOTES
Francisco Lyons - [facility] ([place] [address])  Palliative Medicine  Progress Note    Patient Name: Selma Lux  MRN: [id number]  Admission Date: 1/29/2024  Hospital Length of Stay: 4 days  Code Status: DNR   Attending Provider: Cliff Zacarias*  Consulting Provider: DEBORAH Monique  Primary Care Physician: Génesis Rosario MD  Principal Problem:Sepsis due to pneumonia    Patient information was obtained from relative(s) and primary team.      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 Daughter states that at baseline, her mother has advancing dementia with waxing and waning mental status, which is unfortunately recently progressing. At bs today, pt is awake and oriented to person and place, answer simple questions, and on 3lpm NC.    Reason for Consult Per communication w/Dr. Castillo, [facility] consult for assistance with GOC/ACP.    ACP/GOC 2/2/24 I was able to speak with pt's daughters Madhavi (in person) and Susu (on phone) this morning. We discussed pt's current health status, including her dysphagia,  decreased nutritional status, and progressive frailty. In addition, pt's underlying PNA, co-morbidities, advanced age, and inability to successfully perform thoracentesis yesterday,  put their mother at risk for increased acute events. They shared that they know their mother's wishes and that she would not want heroic measures at this ponit in her life. They both feel strongly that she should be a DNR and that a LAPOST document should be completed to assure full understanding of mother's wishes no matter the circumstance. LaPOST filled out with Madhavi present. Family feels strongly that they would like their mother to return to  "[place] after completion of anti-coagulation therapy for known clots, so that their mother can "be as set up for success as possible". They will also transition to hospice upon discharge, to focus on comfort and QOL, without needing to return to hospital. I have reached out to primary (Dr. Zacarias) who will place DNR and follow up with family.       2/1/24- Left VM with daughter Madhavi yesterday to coordinate time for meeting today. Went to pt's bs this am, no one present, left card on sofa, and asked RN to message me if family comes. Went to room later in afternoon, RN said family came and they took my card, and said they will call me if needed. Pt did go for thoracentesis today, not completed due to "  A safe percutaneous window could not be identified". Pt also reportedly de-sated during positioning for thora. Currently, she remains at bs in room on NC and in no accute distress. I will follow up with family tomorrow.     1/31/24 I spoke with daughter (Madhavi) at length today. She has a very strong knowledge base about all of her mother's co-morbidities and the increasing complications managing them in the face of progressive dementia. She has been utilizing the Dementia clinic's resources for family support and is familiar with the progressive decline of dementia. She was amenable to speaking w/Palliaitve care, as someone in the clinic had recently suggested they speak w/palliative. She has noticed a decline in her mother over the last few months and is ready to have a meaningful GOC discussion. She states her mother is followed by multiple teams (rheumatology, pulm, primary, neuro, and endocrine) and she would like to make sure we are addressing all aspects of care in order to have meaningful GOC. Dr. Castillo at bs today and discussed pending thoracentesis and concern of increased size of thyroid mass. I have messaged pt's primary care provider (Dr. Rosario) as per family's request to alert her " "of admission. Madhavi will speak to her sister and will arrange time for us all to meet tomorrow to further discuss GOC and code status.    MPOA Pt has 2 daughters Madhavi and Rosy who are decision makers. Madhavi is SHIV.     Symptoms   -Debility r/t CVA  -Decreased nutrition, r/t progressive inability to swallow safely    Recommendations  -DNR order   -LaPOST to be signed by physician  -CM to assist with hospice information.           I will follow-up with patient. Please contact us if you have any additional questions.    Subjective:     Chief Complaint:   Chief Complaint   Patient presents with    Hypoxia     Pt coming from [facility] for RA sat 85%, arrives on 97% 3L O2 NC, on abx for pneumonia; +tachypneic, a fib rvr 140s, significant cardiac hx       HPI:   Per chart review: "Slema Lux is a 88 y.o. female with RA with ILD on Cellcept and prednisone,  history of CVA with resultant vascular dementia, L hemiparesis, and debility/bedbound status, AFib, HTN, HFpEF who presents today with hypoxia.      History is provided by her daughter at bedside as she is unable to provide due to her dementia.      She states that at baseline, her mother has advancing dementia with waxing and waning mental status, which is unfortunately recently progressing. She is bedbound and favors lying to her R side given her L hemiparesis. Last week, she developed congestion and mucous production, so her PCP obtained a CXR which showed PNA. She has been on antibiotics since then, and was placed on oxygen as well.  Today, she was taken to a Rheumatology appointment, and her daughter noted that she appeared more fatigued than normal. After she was brought back to her residential home, she reportedly was more tachypnic and hypoxic, therefore she was brought here for evaluation.      She was initially tachycardic, tachypneic, and had large pleural effusion/pneumonia in the right on chest x-ray along with lactic acidosis and " "leukocytosis.  She was given vancomycin and Zosyn, and hospital medicine consulted for admission."       Hospital Course:  No notes on file      Past Medical History:   Diagnosis Date    Acute cystitis without hematuria 2/27/2020    Acute hypoxemic respiratory failure 4/11/2018    Acute respiratory failure with hypoxia 3/2/2020    KERMIT (acute kidney injury) 3/13/2019    Altered mental status     Anemia     Arthritis     Bilateral hand pain 3/14/2018    Branch retinal vein occlusion, left eye 2/20/2015    Chronic bilateral low back pain without sciatica 3/23/2017    Chronic renal failure in pediatric patient, stage 3 (moderate) 4/15/2018    Chronic renal failure syndrome, stage 3 (moderate) 4/15/2018    Chronic systolic (congestive) heart failure 8/6/2021    Last Assessment & Plan:  Formatting of this note might be different from the original. 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 Hospitalization late 2021, w/ rehab to follow (no notes available) --12/13/2021-CT head revealed thin extra-axial hyperdense collection overlying the right cerebral convexity compatible with acute subdural hematoma, neurosurgery was consulted, patient was noted with Keppra 500 mg " b.i.d., apixaban was held -12/19/    Iron deficiency anemia secondary to inadequate dietary iron intake 6/24/2013    Joint pain     Keratoconjunctivitis sicca of both eyes not specified as Sjogren's 7/29/2016    Leiomyoma of uterus, unspecified 9/16/2014    Long QT interval 6/29/2016    Due to medication (plaquenil)     Macular edema 1/12/2015    Multinodular goiter 1/12/2016    Neuropathy 8/23/2017    Plaquenil causing adverse effect in therapeutic use 10/7/2016    Pneumococcal vaccine refused 8/17/2016    Pneumonia due to Streptococcus pneumoniae 4/5/2018    Poor nutrition 12/23/2018    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/2015    RA (rheumatoid arthritis) 12/13/2021    -managed by rhematology, since this is a duplicate problem list entry, will archive this     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       Past Surgical History:   Procedure Laterality Date    BREAST CYST EXCISION      CATARACT EXTRACTION      COLONOSCOPY N/A 9/29/2015    Procedure: COLONOSCOPY;  Surgeon: FIDELINA Sanchez MD;  Location: [facility] ENDO [address]);  Service: Endoscopy;  Laterality: N/A;    EYE SURGERY      INJECTION OF ANESTHETIC AGENT AROUND NERVE Left 4/19/2021    Procedure: BLOCK, NERVE, FEMORAL AND OBTURATOR;  Surgeon: Shivam Gonzalez MD;  Location: [facility];  Service: Pain Management;  Laterality: Left;  consent needed    JOINT REPLACEMENT      right knee    KNEE SURGERY Left 12/31/2013    TKR    left parotidectomy      mixed tumor    VA EVAL,SWALLOW FUNCTION,CINE/VIDEO RECORD  6/5/2021         SALIVARY GLAND SURGERY      TONSILLECTOMY      UPPER GASTROINTESTINAL  ENDOSCOPY         Review of patient's allergies indicates:  No Known Allergies    Medications:  Continuous Infusions:   heparin (porcine) in D5W 11 Units/kg/hr (02/02/24 9934)     Scheduled Meds:   atorvastatin  40 mg Oral QHS    ceFEPime IV (PEDS and ADULTS)  2 g Intravenous Q12H    fluticasone furoate-vilanteroL  1 puff Inhalation Daily    gabapentin  100 mg Oral TID    guaiFENesin 100 mg/5 ml  200 mg Oral Q4H While awake    hydroxychloroquine  200 mg Oral Daily    pantoprazole  40 mg Oral Daily    predniSONE  10 mg Oral Daily    thiamine  100 mg Oral Daily    vancomycin (VANCOCIN) IV (PEDS and ADULTS)  750 mg Intravenous Q24H     PRN Meds:(Magic mouthwash) 1:1:1 diphenhydrAMINE(Benadryl) 12.5mg/5ml liq, aluminum & magnesium hydroxide-simethicone (Maalox), LIDOcaine viscous 2%, acetaminophen, acetaminophen, albuterol-ipratropium, aluminum-magnesium hydroxide-simethicone, heparin (PORCINE), heparin (PORCINE), melatonin, naloxone, ondansetron, polyethylene glycol, QUEtiapine, simethicone, sodium chloride 0.9%, Pharmacy to dose Vancomycin consult **AND** vancomycin - pharmacy to dose    Family History       Problem Relation (Age of Onset)    Breast cancer Maternal Grandmother    Cancer Father    Heart disease Mother    Hypertension Mother    Hyperthyroidism Mother, Other    Prostate cancer Father          Tobacco Use    Smoking status: Never     Passive exposure: Never    Smokeless tobacco: Never   Substance and Sexual Activity    Alcohol use: Yes     Alcohol/week: 0.0 standard drinks of alcohol     Comment: very seldom     Drug use: No    Sexual activity: Not Currently     Comment: ,  age 52,        Review of Systems   Constitutional:  Positive for appetite change.   Respiratory:  Positive for cough.      Objective:     Vital Signs (Most Recent):  Temp: 97.9 °F (36.6 °C) (02/02/24 0726)  Pulse: 105 (02/02/24 1114)  Resp: 18 (02/02/24 0814)  BP: (!) 151/70 (02/02/24 0726)  SpO2: 95 % (02/02/24 0814) Vital  Signs (24h Range):  Temp:  [97.7 °F (36.5 °C)-98.9 °F (37.2 °C)] 97.9 °F (36.6 °C)  Pulse:  [] 105  Resp:  [17-19] 18  SpO2:  [93 %-100 %] 95 %  BP: (129-151)/(65-95) 151/70     Weight: 71.7 kg (158 lb)  Body mass index is 27.12 kg/m².       Physical Exam  Constitutional:       Appearance: She is ill-appearing.   Neurological:      Mental Status: She is disoriented.            Review of Symptoms      Symptom Assessment (ESAS 0-10 Scale)  Pain:  0  Dyspnea:  0  Anxiety:  0  Nausea:  0  Depression:  0  Anorexia:  0  Fatigue:  0  Insomnia:  0  Restlessness:  0  Agitation:  0 due to Dementia         Pain Assessment in Advanced Demential Scale (PAINAD)   Breathing - Independent of vocalization:  0  Negative vocalization:  0  Facial expression:  0  Body language:  0  Consolability:  0  Total:  0    Living Arrangements:  Lives in home    Psychosocial/Cultural:   See Palliative Psychosocial Note: Yes  Ms. Lux resides in [facility] and has 2 daughters active in her care.   **Primary  to Follow**  Palliative Care  Consult: Yes        Advance Care Planning  Advance Directives:   Living Will: Yes        Copy on chart: Yes      Decision Making:  Family answered questions  Goals of Care: What is most important right now is to focus on alleviating suffering and to focus on QOL and comfort. Accordingly, we have decided that the best plan to meet the patient's goals includes completing current therapy and transitioning to hospice care upon discharge.          Significant Labs: BMP:   Recent Labs   Lab 02/01/24 0445   GLU 81      K 4.2   *   CO2 18*   BUN 13   CREATININE 0.8   CALCIUM 8.6*   MG 1.6       CBC:   Recent Labs   Lab 02/01/24  0042 02/01/24 0445 02/02/24 0421   WBC 10.77 13.15* 11.13   HGB 7.5* 8.1* 7.8*   HCT 24.3* 26.0* 25.3*   * 124* 119*       CBC:   Recent Labs   Lab 02/02/24 0421   WBC 11.13   HGB 7.8*   HCT 25.3*   MCV 79*   *       BMP:  No  "results for input(s): "GLU", "NA", "K", "CL", "CO2", "BUN", "CREATININE", "CALCIUM", "MG" in the last 24 hours.    LFT:  Lab Results   Component Value Date    AST 10 02/01/2024    ALKPHOS 56 02/01/2024    BILITOT 1.4 (H) 02/01/2024     Albumin:   Albumin   Date Value Ref Range Status   02/01/2024 2.6 (L) 3.5 - 5.2 g/dL Final     Protein:   Total Protein   Date Value Ref Range Status   02/01/2024 5.4 (L) 6.0 - 8.4 g/dL Final     Lactic acid:   Lab Results   Component Value Date    LACTATE 1.2 01/30/2024    LACTATE 1.2 09/03/2023       Significant Imaging: I have reviewed all pertinent imaging results/findings within the past 24 hours.    > 50% of 55  min visit spent in chart review, face to face discussion of goals of care, charting,  symptom assessment, coordination of care and emotional support   Frances Moore, CNS  Palliative Medicine  [facility] - [facility] ([facility]                "

## 2024-02-02 NOTE — SUBJECTIVE & OBJECTIVE
Interval History: No acute events. No thoracentesis yesterday due to no safe window and patient intolerance of positioning. Continued goals of care conversations with family along with palliative and pulmonary and plan to transition to hospice on discharge.   DNR    Review of Systems  Objective:     Vital Signs (Most Recent):  Temp: 98.3 °F (36.8 °C) (02/02/24 1155)  Pulse: 101 (02/02/24 1155)  Resp: 17 (02/02/24 1155)  BP: 117/74 (02/02/24 1155)  SpO2: (!) 93 % (02/02/24 1155) Vital Signs (24h Range):  Temp:  [97.7 °F (36.5 °C)-98.9 °F (37.2 °C)] 98.3 °F (36.8 °C)  Pulse:  [] 101  Resp:  [17-19] 17  SpO2:  [93 %-100 %] 93 %  BP: (117-151)/(65-95) 117/74     Weight: 71.7 kg (158 lb)  Body mass index is 27.12 kg/m².    Intake/Output Summary (Last 24 hours) at 2/2/2024 1401  Last data filed at 2/2/2024 0552  Gross per 24 hour   Intake 1070 ml   Output 700 ml   Net 370 ml         Physical Exam  Constitutional:       General: She is not in acute distress.     Appearance: She is well-developed. She is not ill-appearing or diaphoretic.   Neck:      Vascular: No JVD.   Cardiovascular:      Rate and Rhythm: Normal rate. Rhythm irregular.   Pulmonary:      Effort: Pulmonary effort is normal. No respiratory distress.      Breath sounds: No wheezing.      Comments: Decreased breath sounds throughout the right lung  Abdominal:      General: There is no distension.      Tenderness: There is no abdominal tenderness.   Musculoskeletal:      Right lower leg: No edema.      Left lower leg: No edema.   Skin:     Coloration: Skin is not jaundiced or pale.   Neurological:      Mental Status: She is alert.      Motor: Weakness (Left hemiparesis) present. No abnormal muscle tone.   Psychiatric:         Cognition and Memory: Cognition is impaired. Memory is impaired.      Comments: Inattentive             Significant Labs: All pertinent labs within the past 24 hours have been reviewed.    Significant Imaging: I have reviewed all  pertinent imaging results/findings within the past 24 hours.

## 2024-02-02 NOTE — PROGRESS NOTES
"While Dr Lux was in IR stopped and spoke to daughter Gege.  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  At that time, she and her sister were living in [place] and [place] and would frequently fly in.  Dr Lux was independent  We discussed that at that time, she had a reversible, treatable process and that advanced interventions such as mechanical ventilation were necessary to allow treatment to take effect.  She has been on immunosuppression since.  We also discussed that since her Mother's stroke that required inpatient rehab, she initially did well but with her confounding dementia has never made a complete neurologic recovery.  In fact, for the first time in years her rheumatologist, Dr Rouse discussed reducing her immunosuppression and did not set a follow up appt to see her in the future.  We discussed that she has had a large goiter due to Hashimoto's thyroiditis for years and that with her severe debility, small effusion the mainstem was compressed but Dr Lux does not have signs of complete obstruction as there is no stridor or wheezing present.  Patient did not toleratae being side down on the contralateral side during thoracentesis, as she desaturated, which indicates that she favors her right side down due to her chronic condition.  She is not able to verbalize this due to her advanced dementia.  Gege understands that surgical interventions are not indicated for her mother due to confounding comorbidities and would not treat the underlying etiology of her clinical decline: persistent neurologic defecit from her stroke and advanced dementia neither condition is reversible.      We then discussed not thinking of each problem as one "silo" but to look at her mother's entire clinical picture.      Gege expressed that they approached end of life discussions previously with their mother but she felt like her mother had anxiety of  leaving the responsibility " of her legacy on their shoulders.  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  She broke barriers and created paths for so many, including myself, and she will always be remembered as her legacy will be carried on by those she touched.      Gege had questions regarding where we go from here.  I stated that we needed to do everything that we can to treat her mother's pneumonia with traditional treatment that do not require mechanical ventilation or cardiac resuscitation including pressors.  If the condition is not reversed with these measures, then her care should be transitioned to comfort.   She did agree that she wanted her mother to be comfortable.  She recalled that while in [facility] she did require an ICU  admission and ventilator when her steroids were not carried forward when d/C'd from [facility].  She admitted that she saw the toll that it took on her mother.  We discussed that sedation may lead to further cognitive decline.  We discussed that with each visit I had with her mother this hospitalization that she was somnolent but appeared comfortable and stable, recovering from pneumonia with current management.      IR attempted Thoracentesis but could not find a safe window to drain the fluid and we will not pursue additional diagnostic pulmonary procedures as she seems to be stable to recovering from pneumonia.      I do believe the Dr Lux aspirated and that this will continue to occur as her dementia advances.      Gege stated that she would like to speak to her family regarding goals of care and advanced directives but agrees that no mechanical ventilation and resuscitation will reverse Dr Otoole continued clinical decline and may accelerate it.  She knows that her mother would agree to comfort care.  I asked that she speak to her family and Dr VILLALOBOS if necessary. She would like to speak  to palliative care as well.  It is always best to have these discussions prior to emergent situations.

## 2024-02-02 NOTE — SUBJECTIVE & OBJECTIVE
Past Medical History:   Diagnosis Date    Acute cystitis without hematuria 2/27/2020    Acute hypoxemic respiratory failure 4/11/2018    Acute respiratory failure with hypoxia 3/2/2020    KERMIT (acute kidney injury) 3/13/2019    Altered mental status     Anemia     Arthritis     Bilateral hand pain 3/14/2018    Branch retinal vein occlusion, left eye 2/20/2015    Chronic bilateral low back pain without sciatica 3/23/2017    Chronic renal failure in pediatric patient, stage 3 (moderate) 4/15/2018    Chronic renal failure syndrome, stage 3 (moderate) 4/15/2018    Chronic systolic (congestive) heart failure 8/6/2021    Last Assessment & Plan:  Formatting of this note might be different from the original. 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 Hospitalization late 2021, w/ rehab to follow (no notes available) --12/13/2021-CT head revealed thin extra-axial hyperdense collection overlying the right cerebral convexity compatible with acute subdural hematoma, neurosurgery was consulted, patient was noted with Keppra 500 mg b.i.d., apixaban was held -12/19/    Iron deficiency anemia secondary to inadequate dietary iron intake 6/24/2013    Joint pain      Keratoconjunctivitis sicca of both eyes not specified as Sjogren's 7/29/2016    Leiomyoma of uterus, unspecified 9/16/2014    Long QT interval 6/29/2016    Due to medication (plaquenil)     Macular edema 1/12/2015    Multinodular goiter 1/12/2016    Neuropathy 8/23/2017    Plaquenil causing adverse effect in therapeutic use 10/7/2016    Pneumococcal vaccine refused 8/17/2016    Pneumonia due to Streptococcus pneumoniae 4/5/2018    Poor nutrition 12/23/2018    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/2015    RA (rheumatoid arthritis) 12/13/2021    -managed by rhematology, since this is a duplicate problem list entry, will archive this     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       Past Surgical History:   Procedure Laterality Date    BREAST CYST EXCISION      CATARACT EXTRACTION      COLONOSCOPY N/A 9/29/2015    Procedure: COLONOSCOPY;  Surgeon: FIDELINA Sanchez MD;  [facility]  Service: Endoscopy;  Laterality: N/A;    EYE SURGERY      INJECTION OF ANESTHETIC AGENT AROUND NERVE Left 4/19/2021    Procedure: BLOCK, NERVE, FEMORAL AND OBTURATOR;  Surgeon: Shivam Gonzalez MD;  Location: [facility] PAIN MGT;  Service: Pain Management;  Laterality: Left;  consent needed    JOINT REPLACEMENT      right knee    KNEE SURGERY Left 12/31/2013    TKR    left parotidectomy      mixed tumor    AK EVAL,SWALLOW FUNCTION,CINE/VIDEO RECORD  6/5/2021         SALIVARY GLAND SURGERY      TONSILLECTOMY      UPPER GASTROINTESTINAL ENDOSCOPY         Review of patient's allergies indicates:  No Known Allergies    Medications:  Continuous Infusions:   heparin (porcine)  in D5W 11 Units/kg/hr (02/02/24 5199)     Scheduled Meds:   atorvastatin  40 mg Oral QHS    ceFEPime IV (PEDS and ADULTS)  2 g Intravenous Q12H    fluticasone furoate-vilanteroL  1 puff Inhalation Daily    gabapentin  100 mg Oral TID    guaiFENesin 100 mg/5 ml  200 mg Oral Q4H While awake    hydroxychloroquine  200 mg Oral Daily    pantoprazole  40 mg Oral Daily    predniSONE  10 mg Oral Daily    thiamine  100 mg Oral Daily    vancomycin (VANCOCIN) IV (PEDS and ADULTS)  750 mg Intravenous Q24H     PRN Meds:(Magic mouthwash) 1:1:1 diphenhydrAMINE(Benadryl) 12.5mg/5ml liq, aluminum & magnesium hydroxide-simethicone (Maalox), LIDOcaine viscous 2%, acetaminophen, acetaminophen, albuterol-ipratropium, aluminum-magnesium hydroxide-simethicone, heparin (PORCINE), heparin (PORCINE), melatonin, naloxone, ondansetron, polyethylene glycol, QUEtiapine, simethicone, sodium chloride 0.9%, Pharmacy to dose Vancomycin consult **AND** vancomycin - pharmacy to dose    Family History       Problem Relation (Age of Onset)    Breast cancer Maternal Grandmother    Cancer Father    Heart disease Mother    Hypertension Mother    Hyperthyroidism Mother, Other    Prostate cancer Father          Tobacco Use    Smoking status: Never     Passive exposure: Never    Smokeless tobacco: Never   Substance and Sexual Activity    Alcohol use: Yes     Alcohol/week: 0.0 standard drinks of alcohol     Comment: very seldom     Drug use: No    Sexual activity: Not Currently     Comment: ,  age 52,        Review of Systems   Constitutional:  Positive for appetite change.   Respiratory:  Positive for cough.      Objective:     Vital Signs (Most Recent):  Temp: 97.9 °F (36.6 °C) (02/02/24 0726)  Pulse: 105 (02/02/24 1114)  Resp: 18 (02/02/24 0814)  BP: (!) 151/70 (02/02/24 0726)  SpO2: 95 % (02/02/24 0814) Vital Signs (24h Range):  Temp:  [97.7 °F (36.5 °C)-98.9 °F (37.2 °C)] 97.9 °F (36.6 °C)  Pulse:  [] 105  Resp:  [17-19] 18  SpO2:  [93  "%-100 %] 95 %  BP: (129-151)/(65-95) 151/70     Weight: 71.7 kg (158 lb)  Body mass index is 27.12 kg/m².       Physical Exam  Constitutional:       Appearance: She is ill-appearing.   Neurological:      Mental Status: She is disoriented.            Review of Symptoms      Symptom Assessment (ESAS 0-10 Scale)  Pain:  0  Dyspnea:  0  Anxiety:  0  Nausea:  0  Depression:  0  Anorexia:  0  Fatigue:  0  Insomnia:  0  Restlessness:  0  Agitation:  0 due to Dementia         Pain Assessment in Advanced Demential Scale (PAINAD)   Breathing - Independent of vocalization:  0  Negative vocalization:  0  Facial expression:  0  Body language:  0  Consolability:  0  Total:  0    Living Arrangements:  Lives in home    Psychosocial/Cultural:   See Palliative Psychosocial Note: Yes  Ms. Lux resides in [facility] and has 2 daughters active in her care.   **Primary  to Follow**  Palliative Care  Consult: Yes        Advance Care Planning   Advance Directives:   Living Will: Yes        Copy on chart: Yes      Decision Making:  Family answered questions  Goals of Care: What is most important right now is to focus on alleviating suffering and to focus on QOL and comfort. Accordingly, we have decided that the best plan to meet the patient's goals includes completing current therapy and transitioning to hospice care upon discharge.          Significant Labs: BMP:   Recent Labs   Lab 02/01/24 0445   GLU 81      K 4.2   *   CO2 18*   BUN 13   CREATININE 0.8   CALCIUM 8.6*   MG 1.6       CBC:   Recent Labs   Lab 02/01/24  0042 02/01/24  0445 02/02/24 0421   WBC 10.77 13.15* 11.13   HGB 7.5* 8.1* 7.8*   HCT 24.3* 26.0* 25.3*   * 124* 119*       CBC:   Recent Labs   Lab 02/02/24  0421   WBC 11.13   HGB 7.8*   HCT 25.3*   MCV 79*   *       BMP:  No results for input(s): "GLU", "NA", "K", "CL", "CO2", "BUN", "CREATININE", "CALCIUM", "MG" in the last 24 hours.    LFT:  Lab Results "   Component Value Date    AST 10 02/01/2024    ALKPHOS 56 02/01/2024    BILITOT 1.4 (H) 02/01/2024     Albumin:   Albumin   Date Value Ref Range Status   02/01/2024 2.6 (L) 3.5 - 5.2 g/dL Final     Protein:   Total Protein   Date Value Ref Range Status   02/01/2024 5.4 (L) 6.0 - 8.4 g/dL Final     Lactic acid:   Lab Results   Component Value Date    LACTATE 1.2 01/30/2024    LACTATE 1.2 09/03/2023       Significant Imaging: I have reviewed all pertinent imaging results/findings within the past 24 hours.

## 2024-02-02 NOTE — PROGRESS NOTES
Pt's daughter, Dr. Sethi, called to inform that pt is in the hospital, a [place] has been completed and pt is now a DNR.  CG said the the most likely plan for discharge is nursing home with hospice.  SW provided education about this process and offered to assist with referral, if needed. Supportive counseling was provided. SW remains available.

## 2024-02-02 NOTE — NURSING
Diet order changed to Full liquids with thickened liquids per Speech Therapy on 2/1/2024.  See managed orders.

## 2024-02-02 NOTE — PROGRESS NOTES
[facility] ([address] Medicine  Progress Note    Patient Name: Selma Lux  MRN: [id number]  Patient Class: IP- Inpatient   Admission Date: 1/29/2024  Length of Stay: 4 days  Attending Physician: Cliff Zacarias*  Primary Care Provider: Génesis Rosario MD        Subjective:     Principal Problem:Sepsis due to pneumonia        HPI:  Selma Lux is a 88 y.o. female with RA with ILD on Cellcept and prednisone,  history of CVA with resultant vascular dementia, L hemiparesis, and debility/bedbound status, AFib, HTN, HFpEF who presents today with hypoxia.     History is provided by her daughter at bedside as she is unable to provide due to her dementia.     She states that at baseline, her mother has advancing dementia with waxing and waning mental status, which is unfortunately recently progressing. She is bedbound and favors lying to her R side given her L hemiparesis. Last week, she developed congestion and mucous production, so her PCP obtained a CXR which showed PNA. She has been on antibiotics since then, and was placed on oxygen as well.  Today, she was taken to a Rheumatology appointment, and her daughter noted that she appeared more fatigued than normal. After she was brought back to her residential home, she reportedly was more tachypnic and hypoxic, therefore she was brought here for evaluation.     She was initially tachycardic, tachypneic, and had large pleural effusion/pneumonia in the right on chest x-ray along with lactic acidosis and leukocytosis.  She was given vancomycin and Zosyn, and hospital medicine consulted for admission.    Per daughter at bedside, she is currently around her baseline mental status, however this waxes and wanes.  Her p.o. intake has been poor recently.    Overview/Hospital Course:  1/30 UA + . UC pending.  increased tachypnea and hypoxia.  Recently diagnosed with pneumonia last week and started on oral antibiotics along with  oxygen.  sats 93% on 2LNC. pneumonia vs  chronic interstitial lung disease from rheumatoid arthritis.   Large pleural effusion noted on chest x-ray, CT chest -  Moderate volume right-sided pleural effusion with adjacent compressive atelectasis.  Patchy ground-glass opacities in the aerated lungs suggestive of underlying infectious/inflammatory process. Numerous new nodular opacities scattered within the aerated portions of the lungs, with leading differential diagnostic considerations of infectious/inflammatory process versus malignancy. .  Enlarged multinodular thyroid gland with complex hypoattenuating substernal mass thought to be of thyroid origin.  This mass was present previously but now causes more mass effect, with new narrowing of the right mainstem bronchus.  Malignancy is not excluded. Pulmonology consulted, continue cefepime and vancomycin. Low suspicion for PE as on eliquis. SLP consulted  -recs regular diet. Hold home CellCept .  IR consulted for diagnostic thoracentesis . started on heparin drip .  Goiter continues to enlarge and may one day compromise her airway. palliative care consulted for discussion with daughters.  1/31 Eliquis on hold. Heparin drip not started overnight due to concerns for bloody stains on gown. Per staff no witnessed hemoptysis. Hb at 7.6-->8.3 . PTT elevated at baseline 37. started on heparin drip. sats 96% on 3LNC. CBC q 6h.    sputum cultures. pulmonology f/u . Endocrinology consulted for multinodular goiter.  Discussed with the daughter at the bedside. MARI - GNR. Identification and susceptibility pending.  SLP to follow up     Interval History: No acute events. No thoracentesis yesterday due to no safe window and patient intolerance of positioning. Continued goals of care conversations with family along with palliative and pulmonary and plan to transition to hospice on discharge.   DNR    Review of Systems  Objective:     Vital Signs (Most Recent):  Temp: 98.3 °F (36.8 °C)  (02/02/24 1155)  Pulse: 101 (02/02/24 1155)  Resp: 17 (02/02/24 1155)  BP: 117/74 (02/02/24 1155)  SpO2: (!) 93 % (02/02/24 1155) Vital Signs (24h Range):  Temp:  [97.7 °F (36.5 °C)-98.9 °F (37.2 °C)] 98.3 °F (36.8 °C)  Pulse:  [] 101  Resp:  [17-19] 17  SpO2:  [93 %-100 %] 93 %  BP: (117-151)/(65-95) 117/74     Weight: 71.7 kg (158 lb)  Body mass index is 27.12 kg/m².    Intake/Output Summary (Last 24 hours) at 2/2/2024 1401  Last data filed at 2/2/2024 0552  Gross per 24 hour   Intake 1070 ml   Output 700 ml   Net 370 ml         Physical Exam  Constitutional:       General: She is not in acute distress.     Appearance: She is well-developed. She is not ill-appearing or diaphoretic.   Neck:      Vascular: No JVD.   Cardiovascular:      Rate and Rhythm: Normal rate. Rhythm irregular.   Pulmonary:      Effort: Pulmonary effort is normal. No respiratory distress.      Breath sounds: No wheezing.      Comments: Decreased breath sounds throughout the right lung  Abdominal:      General: There is no distension.      Tenderness: There is no abdominal tenderness.   Musculoskeletal:      Right lower leg: No edema.      Left lower leg: No edema.   Skin:     Coloration: Skin is not jaundiced or pale.   Neurological:      Mental Status: She is alert.      Motor: Weakness (Left hemiparesis) present. No abnormal muscle tone.   Psychiatric:         Cognition and Memory: Cognition is impaired. Memory is impaired.      Comments: Inattentive             Significant Labs: All pertinent labs within the past 24 hours have been reviewed.    Significant Imaging: I have reviewed all pertinent imaging results/findings within the past 24 hours.    Assessment/Plan:      * Sepsis due to pneumonia  Meets sepsis criteria likely secondary to pneumonia.  Initial lactic acidosis improving with IV fluids.  Blood cultures collected; we will continue vancomycin and cefepime for now given concern for focal pneumonia, and we will follow.  Tailor  antibiotics as appropriate.  Obtain CT chest as below.    This patient does have evidence of infective focus  My overall impression is sepsis.  Source: Respiratory  Antibiotics given-   Antibiotics (72h ago, onward)      Start     Stop Route Frequency Ordered    01/30/24 2200  vancomycin 1,250 mg in dextrose 5 % (D5W) 250 mL IVPB (Vial-Mate)         -- IV Every 24 hours (non-standard times) 01/29/24 2245 01/30/24 0600  ceFEPIme (MAXIPIME) 2 g in dextrose 5 % in water (D5W) 100 mL IVPB (MB+)         -- IV Every 12 hours (non-standard times) 01/29/24 2235 01/29/24 2335  vancomycin - pharmacy to dose  (vancomycin IVPB (PEDS and ADULTS))        See Hyperspace for full Linked Orders Report.    -- IV pharmacy to manage frequency 01/29/24 2235          Latest lactate reviewed-  Recent Labs   Lab 01/29/24 2157   POCLAC 1.54       Organ dysfunction indicated by Acute respiratory failure    Fluid challenge Not needed - patient is not hypotensive      Post- resuscitation assessment No - Post resuscitation assessment not needed       Will Not start Pressors- Levophed for MAP of 65    Pleural effusion  Large pleural effusion noted on chest x-ray, which may be secondary to pneumonia.  No overt evidence of volume overload to suggest heart failure.  We will attempt to obtain CT chest for better characterization; may require thoracentesis.  Continue antibiotics.  1/30   Recently diagnosed with pneumonia last week and started on oral antibiotics along with oxygen.  sats 93% on 2LNC. pneumonia vs  chronic interstitial lung disease from rheumatoid arthritis.   Large pleural effusion noted on chest x-ray, CT chest -  Moderate volume right-sided pleural effusion with adjacent compressive atelectasis.  Patchy ground-glass opacities in the aerated lungs suggestive of underlying infectious/inflammatory process. Numerous new nodular opacities scattered within the aerated portions of the lungs, with leading differential diagnostic  considerations of infectious/inflammatory process versus malignancy. .  Enlarged multinodular thyroid gland with complex hypoattenuating substernal mass thought to be of thyroid origin.  This mass was present previously but now causes more mass effect, with new narrowing of the right mainstem bronchus.  Malignancy is not excluded. Pulmonology consulted, continue cefepime and vancomycin. IR consulted for diagnostic thoracentesis .    1/31 Eliquis on hold. Heparin drip not started overnight due to concerns for bloody stains on gown. Per staff no witnessed hemoptysis. Hb at 7.6-->8.3 . PTT elevated at baseline 37. started on heparin drip. sats 96% on 3LNC. CBC q 6h.    sputum cultures. pulmonology f/u .    Anemia    Patient's with Normocytic anemia.. Hemoglobin stable. Etiology likely due to chronic disease .  Current CBC reviewed-    Recent Labs   Lab 01/31/24  0231 01/31/24  0740 01/31/24  1131   HGB 7.7*  7.7* 8.3* 8.4*         Component Value Date/Time    MCV 81 (L) 01/31/2024 1131    RDW 18.5 (H) 01/31/2024 1131    IRON 14 (L) 10/13/2022 0349    FERRITIN 248 10/13/2022 0349    FOLATE 5.2 09/02/2023 1015    YECUILUI50 1328 (H) 09/02/2023 1015     Monitor CBC and transfuse if H/H drops below 7/21.    1/31Eliquis on hold. Heparin drip not started overnight due to concern for coughing up blood. Hb at 7.6. PTT elevated at baseline 37. sats 96% on 3LNC    COPD without exacerbation  Currently without evidence of acute hypercapnic respiratory failure, and no wheezing on exam.  We will continue home inhalers.  1/30  sats 93% on 2LNC.    Heart failure with preserved ejection fraction  No evidence of acute exacerbation.  We will monitor volume status.  Patient admitted with signs and symptoms of CHF exacerbation. Continue with IV lasix.    Results for orders placed or performed during the hospital encounter of 01/29/24   Brain natriuretic peptide   Result Value Ref Range     (H) 0 - 99 pg/mL    serial troponins.   Cardiac Enzymes trend  Recent Labs   Lab 01/29/24  1836   TROPONINI 0.018     Results for orders placed during the hospital encounter of 10/09/23    Echo    Interpretation Summary    Left Ventricle: The left ventricle is normal in size. Ventricular mass is normal. Normal wall thickness. There is concentric remodeling. Normal wall motion. There is normal systolic function. Ejection fraction by visual approximation is 65%. Grade II diastolic dysfunction.    Left Atrium: Left atrium is severely dilated.    Right Ventricle: Normal right ventricular cavity size. Wall thickness is normal. Right ventricle wall motion  is normal. Systolic function is normal.    Aortic Valve: The aortic valve is a trileaflet valve. There is moderate aortic valve sclerosis. There is mild annular calcification present. There is restricted motion of the left coronary cusp.    Mitral Valve: There is bileaflet sclerosis. Moderately calcified subvalvular apparatus. Mildly restricted motion. There is mild to moderate regurgitation.    Tricuspid Valve: There is mild regurgitation.    Pulmonary Artery: The estimated pulmonary artery systolic pressure is 42 mmHg.    IVC/SVC: Normal venous pressure at 3 mmHg.         Vascular Dementia  Per daughter at bedside, recently with worsening dementia with waxing and waning mental status.  We will continue home Seroquel nightly along with delirium precautions.  1/30 CT head -No acute intracranial process.  Significant motion artifact may diminish the sensitivity. Involutional changes with chronic microvascular ischemic changes.  Small remote lacunar infarct of the right cerebellum and right corona radiata.    Immunocompromised state due to drug therapy  1/30 continue plaquenil.  Hold home CellCept .       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  Continue supportive care and frequent turns.      Multinodular goiter  1/30  CT chest  - .  Enlarged multinodular thyroid gland with complex hypoattenuating substernal mass thought to be of thyroid origin.  This mass was present previously but now causes more mass effect, with new narrowing of the right mainstem bronchus.  Malignancy is not excluded. Pulmonology consulted, TSH WNL .  Goiter continues to enlarge and may one day compromise her airway. palliative care consulted for discussion with daughters.  1/31   Endocrinology consulted for multinodular goiter.  Discussed with the daughter at the bedside.      UTI (urinary tract infection)  1/30 UA + . UC pending. continue cefepime  1/31 UC - GNR. Identification and susceptibility pending.       Thrombocytopenia  Patient was found to have thrombocytopenia, monitor .  Recent Labs   Lab 01/29/24  1836   *         AF (paroxysmal atrial fibrillation)  Currently rate controlled.   1/31 Eliquis on hold. Heparin drip not started overnight due to concerns for bloody stains on gown. Per staff no witnessed hemoptysis. Hb at 7.6-->8.3 . PTT elevated at baseline 37. started on heparin drip.     Oropharyngeal dysphagia  Noted in the past, however not on specific dysphagia diet per daughter.  Her right sided pneumonia may be secondary to aspiration as she is at high risk for this.  May consider speech therapy consultation.  1/30 SLP consulted to rule out aspiration        Acute respiratory failure with hypoxia  Presents from her [facility] center with increased tachypnea and hypoxia.  Recently diagnosed with pneumonia last week and started on oral antibiotics along with oxygen.  PO2 is decreased on ABG, however appears to be chronically decreased.  Suspect pneumonia on chronic interstitial lung disease from rheumatoid arthritis.  We will continue oxygen to maintain sats 88% or greater.  Low suspicion for PE given chronic Eliquis use.  1/30  Recently diagnosed with pneumonia last week and started on oral antibiotics along with oxygen.  sats 93% on 2LNC.  pneumonia vs  chronic interstitial lung disease from rheumatoid arthritis.   Large pleural effusion noted on chest x-ray, CT chest -  Moderate volume right-sided pleural effusion with adjacent compressive atelectasis.     Rheumatoid arthritis of multiple sites  Continue home Plaquenil and prednisone.  Hold home CellCept given acute infection.        VTE Risk Mitigation (From admission, onward)           Ordered     apixaban tablet 5 mg  2 times daily         02/02/24 1401     heparin 25,000 units in dextrose 5% (100 units/ml) IV bolus from bag - ADDITIONAL PRN BOLUS - 30 units/kg  As needed (PRN)        Question:  Heparin Infusion Adjustment (DO NOT MODIFY ANSWER)  Answer:  \\[email or web address]\epic\Images\Pharmacy\HeparinInfusions\heparin LOW INTENSITY nomogram for [facility] [id number].pdf    02/01/24 2034     heparin 25,000 units in dextrose 5% (100 units/ml) IV bolus from bag - ADDITIONAL PRN BOLUS - 60 units/kg  As needed (PRN)        Question:  Heparin Infusion Adjustment (DO NOT MODIFY ANSWER)  Answer:  \\[email or web address]\epic\Images\Pharmacy\HeparinInfusions\heparin LOW INTENSITY nomogram for [facility] [id number]    02/01/24 2034                    Discharge Planning   LETICIA: 2/3/2024     Code Status: DNR   Is the patient medically ready for discharge?:     Reason for patient still in hospital (select all that apply): Patient trending condition and Treatment  Discharge Plan A: Return to nursing home        Cliff Zacarias MD  Department of Hospital Medicine   [facility] ([address])

## 2024-02-02 NOTE — PT/OT/SLP PROGRESS
"Speech Language Pathology Treatment    Patient Name:  Selma Lux   MRN:  [id number]  Admitting Diagnosis: Sepsis due to pneumonia    Recommendations:                  General Recommendations:  Dysphagia therapy  Diet recommendations:  Full liquids-thin liquids  Liquids by straw  *Meds presented whole 1 at a time with NECTAR thick liquids, slow rate, check oral cavity between presentations   *Okay for pleasure feeds of puree consistencies items   Aspiration Precautions: 1 bite/sip at a time, Alternating bites/sips, Assistance with meals, Avoid talking while eating, Check for pocketing/oral residue, Eliminate distractions, Frequent oral care, HOB to 90 degrees, Meds whole 1 at a time, Small bites/sips, and Strict aspiration precautions   General Precautions: Standard, aspiration, fall  Communication strategies:  provide increased time to answer and go to room if call light pushed    Assessment:     Selma Lux is a 88 y.o. female with an SLP diagnosis of Dysphagia.  She presents with no overt s/s aspiration with thin liquids though continued difficulty managing thicker consistencies orally.  Pt may benefit from a nutritional supplement such as boost or ensure.     Subjective     "Are you bringing me food?"     Pain/Comfort:  Pain Rating 1: 0/10  Pain Rating Post-Intervention 1: 0/10    Respiratory Status: Nasal cannul    Objective:     Has the patient been evaluated by SLP for swallowing?   Yes  Keep patient NPO? No     Pt seen bedside, alert and cooperative.  Continued frequent restless like whole body movement though increased attention to simple conversation.  Speech intelligibility and consistent phonation improved today.  Pt repositioned and HOB raised for safety with po trials.  She accepted thin liquids via straw with improved labial seal today.  No overt s/s aspiration.  Puree with crushed solid consistency with minced sot solid presented with prolonged, disorganized mastication with pooled " thinned solids noted prior to delayed swallow.  Cough noted following swallow.  Education provided re: continued diet recs, swallow precs, s/s aspiration, dysphagia, and POC.  Pt attentive though will require reinforcement. Nurse alerted re: results of session and diet recs.     Goals:   Multidisciplinary Problems       SLP Goals          Problem: SLP    Goal Priority Disciplines Outcome   SLP Goal     SLP Ongoing, Progressing   Description: Speech Language Pathology Goals  Goals expected to be met by 2/13  1. Pt will participate in ongoing assessment of swallow.                                  Plan:     Patient to be seen:  3 x/week   Plan of Care expires:  02/29/24  Plan of Care reviewed with:  patient   SLP Follow-Up:  Yes       Discharge recommendations:   (tbd)   Barriers to Discharge:  Level of Skilled Assistance Needed      Time Tracking:     SLP Treatment Date:   02/02/24  Speech Start Time:  0826  Speech Stop Time:  0834     Speech Total Time (min):  8 min    Billable Minutes: Treatment Swallowing Dysfunction 8    02/02/2024

## 2024-02-02 NOTE — PLAN OF CARE
"Discharge Plan A and Plan B have been determined by review of patient's clinical status, future medical and therapeutic needs, and coverage/benefits for post-acute care in coordination with multidisciplinary team members.    02/02/24 1620   Post-Acute Status   Post-Acute Authorization Hospice;Placement   Post-Acute Placement Status Pending post-acute provider review/more information requested   Coverage MEDICARE - MEDICARE PART A & B -   Discharge Plan   Discharge Plan A Return to nursing home;Hospice/home   Discharge Plan B Return to Nursing Home     SW reviewed patient's dc needs w/ attending MD. Per MD, patient is hospice appropriate, patient/family agreeable. Per MD, patient not medically stable for dc at this time. SW reviewed dc plan w/ patient's daughter. Daughter confirmed that patient/family agreeable to hospice. Daughter confirmed plan of return to [facility] w/ hospice. SW requested that daughter provide preference(s) for hospice provider(s), daughter declines at this time stating that she is waiting on her personal contacts to give recommendations. SW offered to provide patient and family w/ a list of hospice providers, daughter declines list stating "It's no use to me, I'm waiting on recommendations". Daughter states that she is at work at this time and will f/u w/ SW regarding preferences. SW provided contact information for f/u.     SW will continue to follow.                      GRANT Patel, SW  [facility]  Case Management  Ext. [phone]   "

## 2024-02-02 NOTE — PLAN OF CARE
Problem: Sleep Disturbance (Delirium)  Goal: Improved Sleep  Outcome: Ongoing, Progressing     Problem: Bleeding (Sepsis/Septic Shock)  Goal: Absence of Bleeding  Outcome: Ongoing, Progressing     Problem: Fluid Imbalance (Pneumonia)  Goal: Fluid Balance  Outcome: Ongoing, Progressing     Problem: Infection (Pneumonia)  Goal: Resolution of Infection Signs and Symptoms  Outcome: Ongoing, Progressing     Problem: Impaired Wound Healing  Goal: Optimal Wound Healing  Outcome: Ongoing, Progressing     Problem: Fall Injury Risk  Goal: Absence of Fall and Fall-Related Injury  Outcome: Ongoing, Progressing     Problem: Skin Injury Risk Increased  Goal: Skin Health and Integrity  Outcome: Ongoing, Progressing

## 2024-02-02 NOTE — NURSING
Discontinued the continuous heparin order while trying to correct the Appt lab draw order.  Notified Dr. Maravilla of the issue.  Md reordered the heparin to continue.  No changes in the dose or interruption in the dose at this time.  Will continue the heparin at 14 units/kg with the rate of 8.6ml.  See managed orders.

## 2024-02-02 NOTE — ASSESSMENT & PLAN NOTE
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 Daughter states that at baseline, her mother has advancing dementia with waxing and waning mental status, which is unfortunately recently progressing. At bs today, pt is awake and oriented to person and place, answer simple questions, and on 3lpm NC.    Reason for Consult Per communication w/Dr. Castillo, Pall med consult for assistance with GOC/ACP.    ACP/GOC 2/2/24 I was able to speak with pt's daughters Madhavi (in person) and Susu (on phone) this morning. We discussed pt's current health status, including her dysphagia,  decreased nutritional status, and progressive frailty. In addition, pt's underlying PNA, co-morbidities, advanced age, and inability to successfully perform thoracentesis yesterday,  put their mother at risk for increased acute events. They shared that they know their mother's wishes and that she would not want heroic measures at this ponit in her life. They both feel strongly that she should be a DNR and that a LAPOST document should be completed to assure full understanding of mother's wishes no matter the circumstance. LaPOST filled out with Madhavi present. Family feels strongly that they would like their mother to return to [facility] after completion of anti-coagulation therapy for known clots, so that their mother can "be as set up for success as possible". They will also transition to hospice upon discharge, to focus on comfort and QOL, without needing to return to hospital. I have reached out to primary (Dr. Zacarias) who will place DNR and follow up with family.       2/1/24- Left VM with daughter Madhavi yesterday to coordinate time for meeting today. Went to pt's bs this am, no one present, left card on sofa, and asked RN to message " "me if family comes. Went to room later in afternoon, RN said family came and they took my card, and said they will call me if needed. Pt did go for thoracentesis today, not completed due to "  A safe percutaneous window could not be identified". Pt also reportedly de-sated during positioning for thora. Currently, she remains at  in room on NC and in no accute distress. I will follow up with family tomorrow.     1/31/24 I spoke with daughter (Madhavi) at length today. She has a very strong knowledge base about all of her mother's co-morbidities and the increasing complications managing them in the face of progressive dementia. She has been utilizing the Dementia clinic's resources for family support and is familiar with the progressive decline of dementia. She was amenable to speaking w/Palliaitve care, as someone in the clinic had recently suggested they speak w/palliative. She has noticed a decline in her mother over the last few months and is ready to have a meaningful GOC discussion. She states her mother is followed by multiple teams (rheumatology, pulm, primary, neuro, and endocrine) and she would like to make sure we are addressing all aspects of care in order to have meaningful GOC. Dr. Castillo at  today and discussed pending thoracentesis and concern of increased size of thyroid mass. I have messaged pt's primary care provider (Dr. Rosario) as per family's request to alert her of admission. Madhavi will speak to her sister and will arrange time for us all to meet tomorrow to further discuss GOC and code status.    MPOA Pt has 2 daughters Madhavi and Rosy who are decision makers. Madhavi is MPOA.     Symptoms   -Debility r/t CVA  -Decreased nutrition, r/t progressive inability to swallow safely    Recommendations  -DNR order   -LaPOST to be signed by physician  -CM to assist with hospice information.     "

## 2024-02-02 NOTE — PLAN OF CARE
Problem: Adult Inpatient Plan of Care  Goal: Optimal Comfort and Wellbeing  Outcome: Ongoing, Progressing  Goal: Readiness for Transition of Care  Outcome: Ongoing, Progressing    Patient noted lying in bed with eyes closed mumbling. AAOx1, no pain noted at this time. No distress noted at this time. Bed in lowest position, side rails up x4, call light within reach, bed wheels locked. Treatment ongoing

## 2024-02-02 NOTE — SUBJECTIVE & OBJECTIVE
Interval History: Dr Bruno was not seen today because she was in IR attempting thoracentesis.  IR could not find safe window to obtain fluid and she became unstable.  Has maintained normal oxygenation and never appears in distress.  Daughter Gege at bedside and we had a long discussion      Objective:     Vital Signs (Most Recent):  Temp: 98.9 °F (37.2 °C) (02/01/24 1519)  Pulse: 92 (02/01/24 1519)  Resp: 19 (02/01/24 1519)  BP: (!) 143/65 (02/01/24 1519)  SpO2: 98 % (02/01/24 1519) Vital Signs (24h Range):  Temp:  [97.7 °F (36.5 °C)-98.9 °F (37.2 °C)] 98.9 °F (37.2 °C)  Pulse:  [] 92  Resp:  [17-32] 19  SpO2:  [84 %-100 %] 98 %  BP: (129-144)/(60-81) 143/65     Weight: 71.7 kg (158 lb)  Body mass index is 27.12 kg/m².      Intake/Output Summary (Last 24 hours) at 2/1/2024 1955  Last data filed at 2/1/2024 1739  Gross per 24 hour   Intake 620 ml   Output 1001 ml   Net -381 ml        Physical Exam      Review of Systems    Vents:       Lines/Drains/Airways       Peripheral Intravenous Line  Duration                  Peripheral IV - Single Lumen 01/31/24 1726 22 G Anterior;Proximal;Right Upper Arm 1 day         Peripheral IV - Single Lumen 01/31/24 2215 20 G;1 3/4 in Anterior;Right Forearm <1 day                    Significant Labs:    CBC/Anemia Profile:  Recent Labs   Lab 01/31/24  1725 02/01/24  0042 02/01/24  0445   WBC 8.57 10.77 13.15*   HGB 8.4* 7.5* 8.1*   HCT 27.4* 24.3* 26.0*   * 118* 124*   MCV 80* 80* 79*   RDW 18.5* 18.6* 18.5*        Chemistries:  Recent Labs   Lab 01/31/24  0231 02/01/24  0445   * 139   K 3.5 4.2    112*   CO2 19* 18*   BUN 17 13   CREATININE 0.9 0.8   CALCIUM 8.5* 8.6*   ALBUMIN 2.5* 2.6*   PROT 5.2* 5.4*   BILITOT 1.1* 1.4*   ALKPHOS 53* 56   ALT 7* 8*   AST 10 10   MG 1.6 1.6       None    Significant Imaging:  I have reviewed all pertinent imaging results/findings within the past 24 hours.  IR could not safely perform thoracentesis as there was a minimal  bobot of fluid      Goals of care discussion with daughter Gume HOUSTON first met Gege when her mother was intubated for ILD associated  and respiratory failure requiring intubation, a reversible process.  We recalled how she and her sister were living in [place] and [place] and they would fly in every time their mother was sick and how they had moved to [facility] to care for her.  We recalled what a pioneer her mother was and she paved the path for so many after her.  We discussed her loss of independence since her stroke and that her dementia is advancing.  I expressed my concerns that she likely has aspiration pneumonia and that I was concerned that this may become a recurring problem resulting in further clinical decline.  I asked her if her mother every expressed her wishes regarding goals of care and advanced care planning.  Gege stated that the subject would be approached but never really discussed.  WE discussed that in previous hospitalizations requiring intubations, her mother had a reversible condition and that intubation and treating would reverse her respiratory failure and improve.  My concern is that with her neurologic injury from her CVA and dementia is not treatable and that intubation and sedation may accelerate her cognitive and physical decline and it would not be recommended.  We talked about doing everything medically indicated, treating pneumonia, etc.  But if treatments would fail to not intubate and resuscitate and treatment would be transitioned to comfort.  It is always best to make these decisions prior to urgent /emergent situations.  She would also like to discuss with DR VILLALOBOS to confirm.      Appreciate Palliative care assistance.      Please continue tx for pneumonia x 7 days.  Thoracentesis no longer indicated as clinically improving.  Strict aspiration precautions.

## 2024-02-02 NOTE — PLAN OF CARE
Problem: Adult Inpatient Plan of Care  Goal: Plan of Care Review  Outcome: Ongoing, Progressing  Flowsheets (Taken 2/1/2024 4036)  Plan of Care Reviewed With:   patient   daughter  Goal: Patient-Specific Goal (Individualized)  Outcome: Ongoing, Progressing     Problem: Fluid Imbalance (Pneumonia)  Goal: Fluid Balance  Outcome: Ongoing, Progressing     Problem: Infection (Pneumonia)  Goal: Resolution of Infection Signs and Symptoms  Outcome: Ongoing, Progressing     Problem: Adjustment to Illness (Delirium)  Goal: Optimal Coping  Outcome: Ongoing, Progressing     Problem: Altered Behavior (Delirium)  Goal: Improved Behavioral Control  Outcome: Ongoing, Progressing     Problem: Attention and Thought Clarity Impairment (Delirium)  Goal: Improved Attention and Thought Clarity  Outcome: Ongoing, Progressing  Pt is awake and alert, but confused and agitated easily throughout the shift vital sign stable, Pt Continues on iv abx and hep drip, scheduled med given. Pt a couple smear BM today, Puric in place, bed is in the lowest position, call light in reach, fall risk reviewed and comfort measures utilized with interventions, pt denies pain and discomfort at this time. Plan of care ongoing.

## 2024-02-03 NOTE — PLAN OF CARE
Discharge Plan A and Plan B have been determined by review of patient's clinical status, future medical and therapeutic needs, and coverage/benefits for post-acute care in coordination with multidisciplinary team members.    02/03/24 1551   Post-Acute Status   Post-Acute Authorization Hospice   Hospice Status Pending post acute provider review/more information requested   Coverage MEDICARE - MEDICARE PART A & B -   Discharge Plan   Discharge Plan A Return to nursing home   Discharge Plan B Return to Nursing Home;Hospice/home     EBONIE received notification that patient stable for return to NH w/ hospice. EBONIE phoned patient's daughter Rosy to review, no answer, EBONIE left  for callback. EBONIE Phoned patient's other daughter Madhavi. Madhavi confirmed dc plan of return to [facility] w/ hospice. SW asked daughter if patient/family has decided on a hospice provider and daughter states no. Per daughter, patient/family is considering [facility] or [facility], daughter states that she will need to confirm with her sister and f/u w/ SW.     EBONIE phoned [facility] , spoke with Veda (weekend supervisor). EBONIE asked Veda to confirm if [facility] is contracted w/ specific provider. Veda states that she is unable to confirm at that time, will need to confirm w/ DON.     3:30pm  EBONIE received update from Veda. Per Veda, [facility] contracted w/ [facility], [facility], [facility] and [facility]. Patient able to return w/ either of the above listed providers based on patient/family's preference.     EBONIE received notification from attending MD that patient not ready for dc Today. Will plan for Monday dc w/ hospice at NH.    EBONIE will continue to follow.                        Caio Bennett, GRANT, LMSW  [facility]  Case Management  Ext. [phone]

## 2024-02-03 NOTE — PROGRESS NOTES
[facility] - Med Surg ([place] [id number])  [facility] Medicine  Progress Note    Patient Name: Selma Lux  MRN: [id number]  Patient Class: IP- Inpatient   Admission Date: 1/29/2024  Length of Stay: 5 days  Attending Physician: Baldomero Galvan MD  Primary Care Provider: Génesis Rosario MD        Subjective:     Principal Problem:ACP (advance care planning)        HPI:  Selma Lux is a 88 y.o. female with RA with ILD on Cellcept and prednisone,  history of CVA with resultant vascular dementia, L hemiparesis, and debility/bedbound status, AFib, HTN, HFpEF who presents today with hypoxia.     History is provided by her daughter at bedside as she is unable to provide due to her dementia.     She states that at baseline, her mother has advancing dementia with waxing and waning mental status, which is unfortunately recently progressing. She is bedbound and favors lying to her R side given her L hemiparesis. Last week, she developed congestion and mucous production, so her PCP obtained a CXR which showed PNA. She has been on antibiotics since then, and was placed on oxygen as well.  Today, she was taken to a Rheumatology appointment, and her daughter noted that she appeared more fatigued than normal. After [facility], she reportedly was more tachypnic and hypoxic, therefore she was brought here for evaluation.     She was initially tachycardic, tachypneic, and had large pleural effusion/pneumonia in the right on chest x-ray along with lactic acidosis and leukocytosis.  She was given vancomycin and Zosyn, and hospital medicine consulted for admission.    Per daughter at bedside, she is currently around her baseline mental status, however this waxes and wanes.  Her p.o. intake has been poor recently.    Overview/Hospital Course:  1/30 UA + . UC pending.  increased tachypnea and hypoxia.  Recently diagnosed with pneumonia last week and started on oral antibiotics along with oxygen.   sats 93% on 2LNC. pneumonia vs  chronic interstitial lung disease from rheumatoid arthritis.   Large pleural effusion noted on chest x-ray, CT chest -  Moderate volume right-sided pleural effusion with adjacent compressive atelectasis.  Patchy ground-glass opacities in the aerated lungs suggestive of underlying infectious/inflammatory process. Numerous new nodular opacities scattered within the aerated portions of the lungs, with leading differential diagnostic considerations of infectious/inflammatory process versus malignancy. .  Enlarged multinodular thyroid gland with complex hypoattenuating substernal mass thought to be of thyroid origin.  This mass was present previously but now causes more mass effect, with new narrowing of the right mainstem bronchus.  Malignancy is not excluded. Pulmonology consulted, continue cefepime and vancomycin. Low suspicion for PE as on eliquis. SLP consulted  -recs regular diet. Hold home CellCept .  IR consulted for diagnostic thoracentesis . started on heparin drip .  Goiter continues to enlarge and may one day compromise her airway. palliative care consulted for discussion with daughters.  1/31 Eliquis on hold. Heparin drip not started overnight due to concerns for bloody stains on gown. Per staff no witnessed hemoptysis. Hb at 7.6-->8.3 . PTT elevated at baseline 37. started on heparin drip. sats 96% on 3LNC. CBC q 6h.    sputum cultures. pulmonology f/u . Endocrinology consulted for multinodular goiter.  Discussed with the daughter at the bedside. MARI - GNR. Identification and susceptibility pending.  SLP to follow up     Interval History:  Patient is for discharge to [facility] with hospice, however given concerns for staffing over the weekend at facility today, aim for discharge on Monday.  Patient condition the same, has periods of outbursts but controllable.    Review of Systems   Unable to perform ROS: Other     Objective:     Vital Signs (Most Recent):  Temp:  98.3 °F (36.8 °C) (02/03/24 1205)  Pulse: 104 (02/03/24 1518)  Resp: 18 (02/03/24 1205)  BP: (!) 143/61 (02/03/24 1205)  SpO2: 96 % (02/03/24 1205) Vital Signs (24h Range):  Temp:  [97.9 °F (36.6 °C)-98.8 °F (37.1 °C)] 98.3 °F (36.8 °C)  Pulse:  [] 104  Resp:  [17-18] 18  SpO2:  [91 %-100 %] 96 %  BP: (133-158)/(61-75) 143/61     Weight: 71.7 kg (158 lb)  Body mass index is 27.12 kg/m².    Intake/Output Summary (Last 24 hours) at 2/3/2024 1606  Last data filed at 2/3/2024 0507  Gross per 24 hour   Intake 480 ml   Output 450 ml   Net 30 ml         Physical Exam  Constitutional:       General: She is not in acute distress.  HENT:      Head: Normocephalic.      Right Ear: External ear normal.      Left Ear: External ear normal.      Nose: Nose normal.      Comments: Nasal cannula  Cardiovascular:      Rate and Rhythm: Normal rate.   Pulmonary:      Comments: Decreased air entry to right lung field  Abdominal:      Palpations: Abdomen is soft.      Tenderness: There is no abdominal tenderness.   Musculoskeletal:      Right lower leg: No edema.      Left lower leg: No edema.      Comments: Debility   Neurological:      Mental Status: She is alert. Mental status is at baseline.             Significant Labs: All pertinent labs within the past 24 hours have been reviewed.      Assessment/Plan:      * ACP (advance care planning)  Patient has complex medical issues deemed to be declining.  She is unable to make decisions for herself and family has opted for DNR and discharged to [facility] with hospice care.  I discussed with daughter via phone, we will aim to focus on comfort care at this time and opt for medications that will improve quality of life.  She is Currently on IV antibiotics for UTI and presumed Pneumonia, we will transitioned to oral antibiotics upon discharge.  Continue oxygen.  We will continue home medication including steroids, had discussion with daughter re steroids use, decided to continue  for now to avoid flare ups and thus improve quality of life.  Plan is to discharge to nursing home with hospice on Monday due to staffing issues at this time.      Pleural effusion  Large pleural effusion noted on chest x-ray, which may be secondary to pneumonia.  No overt evidence of volume overload to suggest heart failure.  We will attempt to obtain CT chest for better characterization; may require thoracentesis.  Continue antibiotics.  1/30   Recently diagnosed with pneumonia last week and started on oral antibiotics along with oxygen.  sats 93% on 2LNC. pneumonia vs  chronic interstitial lung disease from rheumatoid arthritis.   Large pleural effusion noted on chest x-ray, CT chest -  Moderate volume right-sided pleural effusion with adjacent compressive atelectasis.  Patchy ground-glass opacities in the aerated lungs suggestive of underlying infectious/inflammatory process. Numerous new nodular opacities scattered within the aerated portions of the lungs, with leading differential diagnostic considerations of infectious/inflammatory process versus malignancy. .  Enlarged multinodular thyroid gland with complex hypoattenuating substernal mass thought to be of thyroid origin.  This mass was present previously but now causes more mass effect, with new narrowing of the right mainstem bronchus.  Malignancy is not excluded. Pulmonology consulted, continue cefepime and vancomycin. IR consulted for diagnostic thoracentesis .    1/31 Eliquis on hold. Heparin drip not started overnight due to concerns for bloody stains on gown. Per staff no witnessed hemoptysis. Hb at 7.6-->8.3 . PTT elevated at baseline 37. started on heparin drip. sats 96% on 3LNC. CBC q 6h.    sputum cultures. pulmonology f/u .    Sepsis due to pneumonia  Meets sepsis criteria likely secondary to pneumonia.  Initial lactic acidosis improving with IV fluids.  Blood cultures collected; we will continue vancomycin and cefepime for now given concern for  focal pneumonia, and we will follow.  Tailor antibiotics as appropriate.  Obtain CT chest as below.    This patient does have evidence of infective focus  My overall impression is sepsis.  Source: Respiratory  Antibiotics given-   Antibiotics (72h ago, onward)      Start     Stop Route Frequency Ordered    01/30/24 2200  vancomycin 1,250 mg in dextrose 5 % (D5W) 250 mL IVPB (Vial-Mate)         -- IV Every 24 hours (non-standard times) 01/29/24 2245    01/30/24 0600  ceFEPIme (MAXIPIME) 2 g in dextrose 5 % in water (D5W) 100 mL IVPB (MB+)         -- IV Every 12 hours (non-standard times) 01/29/24 2235 01/29/24 2335  vancomycin - pharmacy to dose  (vancomycin IVPB (PEDS and ADULTS))        See Hyperspace for full Linked Orders Report.    -- IV pharmacy to manage frequency 01/29/24 2235          Latest lactate reviewed-  Recent Labs   Lab 01/29/24  2157   POCLAC 1.54       Organ dysfunction indicated by Acute respiratory failure    Fluid challenge Not needed - patient is not hypotensive      Post- resuscitation assessment No - Post resuscitation assessment not needed       Will Not start Pressors- Levophed for MAP of 65    Anemia    Patient's with Normocytic anemia.. Hemoglobin stable. Etiology likely due to chronic disease .  Current CBC reviewed-    Recent Labs   Lab 01/31/24  0231 01/31/24  0740 01/31/24  1131   HGB 7.7*  7.7* 8.3* 8.4*         Component Value Date/Time    MCV 81 (L) 01/31/2024 1131    RDW 18.5 (H) 01/31/2024 1131    IRON 14 (L) 10/13/2022 0349    FERRITIN 248 10/13/2022 0349    FOLATE 5.2 09/02/2023 1015    [id number] 1328 (H) 09/02/2023 1015     Monitor CBC and transfuse if H/H drops below 7/21.    1/31Eliquis on hold. Heparin drip not started overnight due to concern for coughing up blood. Hb at 7.6. PTT elevated at baseline 37. sats 96% on 3LNC    COPD without exacerbation  Currently without evidence of acute hypercapnic respiratory failure, and no wheezing on exam.  We will continue home  inhalers.  1/30  sats 93% on 2LNC.    Heart failure with preserved ejection fraction  No evidence of acute exacerbation.  We will monitor volume status.  Patient admitted with signs and symptoms of CHF exacerbation. Continue with IV lasix.    Results for orders placed or performed during the hospital encounter of 01/29/24   Brain natriuretic peptide   Result Value Ref Range     (H) 0 - 99 pg/mL    serial troponins.  Cardiac Enzymes trend  Recent Labs   Lab 01/29/24  1836   TROPONINI 0.018     Results for orders placed during the hospital encounter of 10/09/23    Echo    Interpretation Summary    Left Ventricle: The left ventricle is normal in size. Ventricular mass is normal. Normal wall thickness. There is concentric remodeling. Normal wall motion. There is normal systolic function. Ejection fraction by visual approximation is 65%. Grade II diastolic dysfunction.    Left Atrium: Left atrium is severely dilated.    Right Ventricle: Normal right ventricular cavity size. Wall thickness is normal. Right ventricle wall motion  is normal. Systolic function is normal.    Aortic Valve: The aortic valve is a trileaflet valve. There is moderate aortic valve sclerosis. There is mild annular calcification present. There is restricted motion of the left coronary cusp.    Mitral Valve: There is bileaflet sclerosis. Moderately calcified subvalvular apparatus. Mildly restricted motion. There is mild to moderate regurgitation.    Tricuspid Valve: There is mild regurgitation.    Pulmonary Artery: The estimated pulmonary artery systolic pressure is 42 mmHg.    IVC/SVC: Normal venous pressure at 3 mmHg.         Vascular Dementia  Per daughter at bedside, recently with worsening dementia with waxing and waning mental status.  We will continue home Seroquel nightly along with delirium precautions.  1/30 CT head -No acute intracranial process.  Significant motion artifact may diminish the sensitivity. Involutional changes with  chronic microvascular ischemic changes.  Small remote lacunar infarct of the right cerebellum and right corona radiata.    Immunocompromised state due to drug therapy  1/30 continue plaquenil.  Hold home CellCept .       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  Continue supportive care and frequent turns.      Multinodular goiter  1/30  CT chest - .  Enlarged multinodular thyroid gland with complex hypoattenuating substernal mass thought to be of thyroid origin.  This mass was present previously but now causes more mass effect, with new narrowing of the right mainstem bronchus.  Malignancy is not excluded. Pulmonology consulted, TSH WNL .  Goiter continues to enlarge and may one day compromise her airway. palliative care consulted for discussion with daughters.  1/31   Endocrinology consulted for multinodular goiter.  Discussed with the daughter at the bedside.      UTI (urinary tract infection)  1/30 UA + . UC pending. continue cefepime  1/31 UC - GNR. Identification and susceptibility pending.       Thrombocytopenia  Patient was found to have thrombocytopenia, monitor .  Recent Labs   Lab 01/29/24  1836   *         AF (paroxysmal atrial fibrillation)  Currently rate controlled.   1/31 Eliquis on hold. Heparin drip not started overnight due to concerns for bloody stains on gown. Per staff no witnessed hemoptysis. Hb at 7.6-->8.3 . PTT elevated at baseline 37. started on heparin drip.     Oropharyngeal dysphagia  Noted in the past, however not on specific dysphagia diet per daughter.  Her right sided pneumonia may be secondary to aspiration as she is at high risk for this.  May consider speech therapy consultation.  1/30 SLP consulted to rule out aspiration        Acute respiratory failure with hypoxia  Presents from her [facility] center with increased tachypnea and hypoxia.  Recently diagnosed with pneumonia last week and  started on oral antibiotics along with oxygen.  PO2 is decreased on ABG, however appears to be chronically decreased.  Suspect pneumonia on chronic interstitial lung disease from rheumatoid arthritis.  We will continue oxygen to maintain sats 88% or greater.  Low suspicion for PE given chronic Eliquis use.  1/30  Recently diagnosed with pneumonia last week and started on oral antibiotics along with oxygen.  sats 93% on 2LNC. pneumonia vs  chronic interstitial lung disease from rheumatoid arthritis.   Large pleural effusion noted on chest x-ray, CT chest -  Moderate volume right-sided pleural effusion with adjacent compressive atelectasis.     Rheumatoid arthritis of multiple sites  Continue home Plaquenil and prednisone.  Hold home CellCept given acute infection.        VTE Risk Mitigation (From admission, onward)           Ordered     apixaban tablet 5 mg  2 times daily         02/02/24 1401                    Discharge Planning   LETICIA: 2/3/2024     Code Status: DNR   Is the patient medically ready for discharge?:     Reason for patient still in hospital (select all that apply): Patient trending condition  Discharge Plan A: Return to nursing home                  Baldomero Galvan MD  Department of Hospital Medicine   [facility] Surg ([place]-[facility])

## 2024-02-03 NOTE — ASSESSMENT & PLAN NOTE
Patient has complex medical issues deemed to be declining.  She is unable to make decisions for herself and family has opted for DNR and discharged to [facility] with hospice care.  I discussed with daughter via phone, we will aim to focus on comfort care at this time and opt for medications that will improve quality of life.  She is Currently on IV antibiotics for UTI and presumed Pneumonia, we will transitioned to oral antibiotics upon discharge.  Continue oxygen.  We will continue home medication including steroids, had discussion with daughter re steroids use, decided to continue for now to avoid flare ups and thus improve quality of life.  Plan is to discharge to nursing home with hospice on Monday due to staffing issues at this time.

## 2024-02-03 NOTE — SUBJECTIVE & OBJECTIVE
Interval History:  Patient is for discharge to [facility] with hospice, however given concerns for staffing over the weekend at facility today, aim for discharge on Monday.  Patient condition the same, has periods of outbursts but controllable.    Review of Systems   Unable to perform ROS: Other     Objective:     Vital Signs (Most Recent):  Temp: 98.3 °F (36.8 °C) (02/03/24 1205)  Pulse: 104 (02/03/24 1518)  Resp: 18 (02/03/24 1205)  BP: (!) 143/61 (02/03/24 1205)  SpO2: 96 % (02/03/24 1205) Vital Signs (24h Range):  Temp:  [97.9 °F (36.6 °C)-98.8 °F (37.1 °C)] 98.3 °F (36.8 °C)  Pulse:  [] 104  Resp:  [17-18] 18  SpO2:  [91 %-100 %] 96 %  BP: (133-158)/(61-75) 143/61     Weight: 71.7 kg (158 lb)  Body mass index is 27.12 kg/m².    Intake/Output Summary (Last 24 hours) at 2/3/2024 1606  Last data filed at 2/3/2024 0507  Gross per 24 hour   Intake 480 ml   Output 450 ml   Net 30 ml         Physical Exam  Constitutional:       General: She is not in acute distress.  HENT:      Head: Normocephalic.      Right Ear: External ear normal.      Left Ear: External ear normal.      Nose: Nose normal.      Comments: Nasal cannula  Cardiovascular:      Rate and Rhythm: Normal rate.   Pulmonary:      Comments: Decreased air entry to right lung field  Abdominal:      Palpations: Abdomen is soft.      Tenderness: There is no abdominal tenderness.   Musculoskeletal:      Right lower leg: No edema.      Left lower leg: No edema.      Comments: Debility   Neurological:      Mental Status: She is alert. Mental status is at baseline.             Significant Labs: All pertinent labs within the past 24 hours have been reviewed.

## 2024-02-03 NOTE — PROGRESS NOTES
Pharmacokinetic Assessment Follow Up: IV Vancomycin    Vancomycin serum concentration assessment(s):    The trough level was drawn correctly and can be used to guide therapy at this time. The measurement is within the desired definitive target range of 10 to 20 mcg/mL.    Vancomycin Regimen Plan:    Continue regimen of Vancomycin 750 mg IV every 24 hours with next serum trough concentration measured at 2200 prior to 2300 dose on 2/4    Drug levels (last 3 results):  Recent Labs   Lab Result Units 01/31/24  2116 02/02/24  2134   Vancomycin-Trough ug/mL 19.8 19.7       Pharmacy will continue to follow and monitor vancomycin.    Please contact pharmacy at extension [phone] for questions regarding this assessment.    Thank you for the consult,   Dorothy Steven       Patient brief summary:  Selma Lux is a 88 y.o. female initiated on antimicrobial therapy with IV Vancomycin for treatment of lower respiratory infection      Drug Allergies:   Review of patient's allergies indicates:  No Known Allergies    Actual Body Weight:   71.7 kg     Renal Function:   Estimated Creatinine Clearance: 49 mL/min (based on SCr of 0.8 mg/dL).,     Dialysis Method (if applicable):  N/A    CBC (last 72 hours):  Recent Labs   Lab Result Units 01/31/24  0231 01/31/24  0740 01/31/24  1131 01/31/24  1725 02/01/24  0042 02/01/24  0445 02/02/24  0421   WBC K/uL 12.65  12.65 14.88* 14.54* 8.57 10.77 13.15* 11.13   Hemoglobin g/dL 7.7*  7.7* 8.3* 8.4* 8.4* 7.5* 8.1* 7.8*   Hematocrit % 25.3*  25.3* 26.9* 27.2* 27.4* 24.3* 26.0* 25.3*   Platelets K/uL 109*  109* 104* 112* 117* 118* 124* 119*   Gran % % 53.3  53.3 51.5 51.3 75.2* 58.0 53.4 53.1   Lymph % % 14.5*  14.5* 12.8* 10.3* 6.7* 13.2* 17.7* 19.9   Mono % % 29.5*  29.5* 32.5* 34.9* 15.9* 26.9* 25.9* 23.7*   Eosinophil % % 1.7  1.7 1.8 2.3 0.7 1.0 1.7 2.0   Basophil % % 0.1  0.1 0.3 0.2 0.1 0.1 0.2 0.1   Differential Method  Automated  Automated Automated Automated Automated  Automated Automated Automated       Metabolic Panel (last 72 hours):  Recent Labs   Lab Result Units 01/31/24  0231 02/01/24  0445 02/02/24  0421   Sodium mmol/L 134* 139 139   Potassium mmol/L 3.5 4.2 4.0   Chloride mmol/L 105 112* 112*   CO2 mmol/L 19* 18* 20*   Glucose mg/dL 73 81 61*   BUN mg/dL 17 13 11   Creatinine mg/dL 0.9 0.8 0.8   Albumin g/dL 2.5* 2.6* 2.4*   Total Bilirubin mg/dL 1.1* 1.4* 1.3*   Alkaline Phosphatase U/L 53* 56 47*   AST U/L 10 10 9*   ALT U/L 7* 8* 9*   Magnesium mg/dL 1.6 1.6 1.5*       Vancomycin Administrations:  vancomycin given in the last 96 hours                     vancomycin 750 mg in dextrose 5 % (D5W) 250 mL IVPB (Vial-Mate) (mg) 750 mg New Bag 02/02/24 2218     750 mg New Bag 02/01/24 2251     750 mg New Bag 01/31/24 2331    vancomycin 1,250 mg in dextrose 5 % (D5W) 250 mL IVPB (Vial-Mate) (mg) 1,250 mg New Bag 01/30/24 2259                    Microbiologic Results:  Microbiology Results (last 7 days)       Procedure Component Value Units Date/Time    Blood culture x two cultures. Draw prior to antibiotics. [[id number]] Collected: 01/29/24 1843    Order Status: Completed Specimen: Blood from Peripheral, Forearm, Right Updated: 02/02/24 2012     Blood Culture, Routine No Growth to date      No Growth to date      No Growth to date      No Growth to date      No Growth to date    Narrative:      Aerobic and anaerobic    Blood culture x two cultures. Draw prior to antibiotics. [[id number]] Collected: 01/29/24 1835    Order Status: Completed Specimen: Blood from Peripheral, Hand, Right Updated: 02/02/24 2012     Blood Culture, Routine No Growth to date      No Growth to date      No Growth to date      No Growth to date      No Growth to date    Narrative:      Aerobic and anaerobic    Urine culture [[id number]]  (Abnormal)  (Susceptibility) Collected: 01/30/24 0334    Order Status: Completed Specimen: Urine Updated: 02/02/24 0944     Urine Culture, Routine PROTEUS  MIRABILIS  50,000 - 99,999 cfu/ml        ESCHERICHIA COLI ESBL  50,000 - 99,999 cfu/ml      Narrative:      Specimen Source->Urine    Culture, Respiratory with Gram Stain [[id number]]     Order Status: No result Specimen: Respiratory     Aerobic culture [[id number]]     Order Status: No result Specimen: Pleural Fluid     Culture, Anaerobic [[id number]]     Order Status: No result Specimen: Pleural Fluid     Gram stain [[id number]]     Order Status: No result Specimen: Pleural Fluid     AFB Culture & Smear [[id number]]     Order Status: No result Specimen: Pleural Fluid     Fungus culture [[id number]]     Order Status: No result Specimen: Pleural Fluid     Culture, MRSA [[id number]]     Order Status: No result Specimen: MRSA source from Nares, Right     Influenza A & B by Molecular [[id number]] Collected: 01/29/24 1838    Order Status: Completed Specimen: Nasopharyngeal Swab Updated: 01/29/24 1931     Influenza A, Molecular Negative     Influenza B, Molecular Negative     Flu A & B Source NP

## 2024-02-04 NOTE — SUBJECTIVE & OBJECTIVE
Interval History:  Patient at times gets really restless and agitated, however for the most part is bed-bound.  Spoke with daughters, and also check to QTC, they are okay with given Haldol p.r.n..    Review of Systems   Unable to perform ROS: Other     Objective:     Vital Signs (Most Recent):  Temp: 98.5 °F (36.9 °C) (02/04/24 1108)  Pulse: (!) 112 (02/04/24 1118)  Resp: 18 (02/04/24 1108)  BP: (!) 159/75 (02/04/24 1108)  SpO2: 100 % (02/04/24 1108) Vital Signs (24h Range):  Temp:  [97.6 °F (36.4 °C)-99 °F (37.2 °C)] 98.5 °F (36.9 °C)  Pulse:  [] 112  Resp:  [18] 18  SpO2:  [94 %-100 %] 100 %  BP: (125-159)/(60-84) 159/75     Weight: 71.7 kg (158 lb)  Body mass index is 27.12 kg/m².    Intake/Output Summary (Last 24 hours) at 2/4/2024 1353  Last data filed at 2/4/2024 0315  Gross per 24 hour   Intake 140 ml   Output 850 ml   Net -710 ml         Physical Exam  Constitutional:       General: She is not in acute distress.  HENT:      Head: Normocephalic.      Right Ear: External ear normal.      Left Ear: External ear normal.      Nose: Nose normal.      Comments: Nasal cannula  Cardiovascular:      Rate and Rhythm: Normal rate.   Pulmonary:      Comments: Decreased air entry to right lung field  Abdominal:      Palpations: Abdomen is soft.      Tenderness: There is no abdominal tenderness.   Musculoskeletal:      Right lower leg: No edema.      Left lower leg: No edema.      Comments: Debility   Neurological:      Mental Status: She is alert. Mental status is at baseline.            Significant Labs: All pertinent labs within the past 24 hours have been reviewed.

## 2024-02-04 NOTE — PROGRESS NOTES
[facility] ([place] [id number])  [facility] Medicine  Progress Note    Patient Name: Selma Lux  MRN: [id number]  Patient Class: IP- Inpatient   Admission Date: 1/29/2024  Length of Stay: 6 days  Attending Physician: Baldomero Galvan MD  Primary Care Provider: Génesis Rosario MD        Subjective:     Principal Problem:ACP (advance care planning)        HPI:  Selma Lux is a 88 y.o. female with RA with ILD on Cellcept and prednisone,  history of CVA with resultant vascular dementia, L hemiparesis, and debility/bedbound status, AFib, HTN, HFpEF who presents today with hypoxia.     History is provided by her daughter at bedside as she is unable to provide due to her dementia.     She states that at baseline, her mother has advancing dementia with waxing and waning mental status, which is unfortunately recently progressing. She is bedbound and favors lying to her R side given her L hemiparesis. Last week, she developed congestion and mucous production, so her PCP obtained a CXR which showed PNA. She has been on antibiotics since then, and was placed on oxygen as well.  Today, she was taken to a Rheumatology appointment, and her daughter noted that she appeared more fatigued than normal. After she was brought back to her senior living home, she reportedly was more tachypnic and hypoxic, therefore she was brought here for evaluation.     She was initially tachycardic, tachypneic, and had large pleural effusion/pneumonia in the right on chest x-ray along with lactic acidosis and leukocytosis.  She was given vancomycin and Zosyn, and hospital medicine consulted for admission.    Per daughter at bedside, she is currently around her baseline mental status, however this waxes and wanes.  Her p.o. intake has been poor recently.    Overview/Hospital Course:  1/30 UA + . UC pending.  increased tachypnea and hypoxia.  Recently diagnosed with pneumonia last week and started on oral antibiotics along with oxygen.   sats 93% on 2LNC. pneumonia vs  chronic interstitial lung disease from rheumatoid arthritis.   Large pleural effusion noted on chest x-ray, CT chest -  Moderate volume right-sided pleural effusion with adjacent compressive atelectasis.  Patchy ground-glass opacities in the aerated lungs suggestive of underlying infectious/inflammatory process. Numerous new nodular opacities scattered within the aerated portions of the lungs, with leading differential diagnostic considerations of infectious/inflammatory process versus malignancy. .  Enlarged multinodular thyroid gland with complex hypoattenuating substernal mass thought to be of thyroid origin.  This mass was present previously but now causes more mass effect, with new narrowing of the right mainstem bronchus.  Malignancy is not excluded. Pulmonology consulted, continue cefepime and vancomycin. Low suspicion for PE as on eliquis. SLP consulted  -recs regular diet. Hold home CellCept .  IR consulted for diagnostic thoracentesis . started on heparin drip .  Goiter continues to enlarge and may one day compromise her airway. palliative care consulted for discussion with daughters.  1/31 Eliquis on hold. Heparin drip not started overnight due to concerns for bloody stains on gown. Per staff no witnessed hemoptysis. Hb at 7.6-->8.3 . PTT elevated at baseline 37. started on heparin drip. sats 96% on 3LNC. CBC q 6h.    sputum cultures. pulmonology f/u . Endocrinology consulted for multinodular goiter.  Discussed with the daughter at the bedside. MARI - GNR. Identification and susceptibility pending.  SLP to follow up     Interval History:  Patient at times gets really restless and agitated, however for the most part is bed-bound.  Spoke with daughters, and also check to QTC, they are okay with given Haldol p.r.n..    Review of Systems   Unable to perform ROS: Other     Objective:     Vital Signs (Most Recent):  Temp: 98.5 °F (36.9 °C) (02/04/24 1108)  Pulse: (!) 112 (02/04/24  1118)  Resp: 18 (02/04/24 1108)  BP: (!) 159/75 (02/04/24 1108)  SpO2: 100 % (02/04/24 1108) Vital Signs (24h Range):  Temp:  [97.6 °F (36.4 °C)-99 °F (37.2 °C)] 98.5 °F (36.9 °C)  Pulse:  [] 112  Resp:  [18] 18  SpO2:  [94 %-100 %] 100 %  BP: (125-159)/(60-84) 159/75     Weight: 71.7 kg (158 lb)  Body mass index is 27.12 kg/m².    Intake/Output Summary (Last 24 hours) at 2/4/2024 1353  Last data filed at 2/4/2024 0315  Gross per 24 hour   Intake 140 ml   Output 850 ml   Net -710 ml         Physical Exam  Constitutional:       General: She is not in acute distress.  HENT:      Head: Normocephalic.      Right Ear: External ear normal.      Left Ear: External ear normal.      Nose: Nose normal.      Comments: Nasal cannula  Cardiovascular:      Rate and Rhythm: Normal rate.   Pulmonary:      Comments: Decreased air entry to right lung field  Abdominal:      Palpations: Abdomen is soft.      Tenderness: There is no abdominal tenderness.   Musculoskeletal:      Right lower leg: No edema.      Left lower leg: No edema.      Comments: Debility   Neurological:      Mental Status: She is alert. Mental status is at baseline.            Significant Labs: All pertinent labs within the past 24 hours have been reviewed.        Assessment/Plan:      * ACP (advance care planning)  Patient has complex medical issues deemed to be declining.  She is unable to make decisions for herself and family has opted for DNR and discharged to [facility] with hospice care.  I discussed with daughter via phone, we will aim to focus on comfort care at this time and opt for medications that will improve quality of life.  She is Currently on IV antibiotics for UTI and presumed Pneumonia, we will transitioned to oral antibiotics upon discharge.  Continue oxygen.  We will continue home medication including steroids, had discussion with daughter re steroids use, decided to continue for now to avoid flare ups and thus improve quality of  life.  Plan is to discharge to nursing home with hospice on Monday due to staffing issues at this time.    2/4/24  We will do Haldol p.r.n. for severe agitation.  Plan is for discharge to nursing home with hospice tomorrow      Pleural effusion  Large pleural effusion noted on chest x-ray, which may be secondary to pneumonia.  No overt evidence of volume overload to suggest heart failure.  We will attempt to obtain CT chest for better characterization; may require thoracentesis.  Continue antibiotics.  1/30   Recently diagnosed with pneumonia last week and started on oral antibiotics along with oxygen.  sats 93% on 2LNC. pneumonia vs  chronic interstitial lung disease from rheumatoid arthritis.   Large pleural effusion noted on chest x-ray, CT chest -  Moderate volume right-sided pleural effusion with adjacent compressive atelectasis.  Patchy ground-glass opacities in the aerated lungs suggestive of underlying infectious/inflammatory process. Numerous new nodular opacities scattered within the aerated portions of the lungs, with leading differential diagnostic considerations of infectious/inflammatory process versus malignancy. .  Enlarged multinodular thyroid gland with complex hypoattenuating substernal mass thought to be of thyroid origin.  This mass was present previously but now causes more mass effect, with new narrowing of the right mainstem bronchus.  Malignancy is not excluded. Pulmonology consulted, continue cefepime and vancomycin. IR consulted for diagnostic thoracentesis .    1/31 Eliquis on hold. Heparin drip not started overnight due to concerns for bloody stains on gown. Per staff no witnessed hemoptysis. Hb at 7.6-->8.3 . PTT elevated at baseline 37. started on heparin drip. sats 96% on 3LNC. CBC q 6h.    sputum cultures. pulmonology f/u .    Sepsis due to pneumonia  Meets sepsis criteria likely secondary to pneumonia.  Initial lactic acidosis improving with IV fluids.  Blood cultures collected; we  will continue vancomycin and cefepime for now given concern for focal pneumonia, and we will follow.  Tailor antibiotics as appropriate.  Obtain CT chest as below.    This patient does have evidence of infective focus  My overall impression is sepsis.  Source: Respiratory  Antibiotics given-   Antibiotics (72h ago, onward)      Start     Stop Route Frequency Ordered    01/30/24 2200  vancomycin 1,250 mg in dextrose 5 % (D5W) 250 mL IVPB (Vial-Mate)         -- IV Every 24 hours (non-standard times) 01/29/24 2245    01/30/24 0600  ceFEPIme (MAXIPIME) 2 g in dextrose 5 % in water (D5W) 100 mL IVPB (MB+)         -- IV Every 12 hours (non-standard times) 01/29/24 2235 01/29/24 2335  vancomycin - pharmacy to dose  (vancomycin IVPB (PEDS and ADULTS))        See Hyperspace for full Linked Orders Report.    -- IV pharmacy to manage frequency 01/29/24 2235          Latest lactate reviewed-  Recent Labs   Lab 01/29/24  2157   POCLAC 1.54       Organ dysfunction indicated by Acute respiratory failure    Fluid challenge Not needed - patient is not hypotensive      Post- resuscitation assessment No - Post resuscitation assessment not needed       Will Not start Pressors- Levophed for MAP of 65    Anemia    Patient's with Normocytic anemia.. Hemoglobin stable. Etiology likely due to chronic disease .  Current CBC reviewed-    Recent Labs   Lab 01/31/24  0231 01/31/24  0740 01/31/24  1131   HGB 7.7*  7.7* 8.3* 8.4*         Component Value Date/Time    MCV 81 (L) 01/31/2024 1131    RDW 18.5 (H) 01/31/2024 1131    IRON 14 (L) 10/13/2022 0349    FERRITIN 248 10/13/2022 0349    FOLATE 5.2 09/02/2023 1015    LCRGUFNZ87 1328 (H) 09/02/2023 1015     Monitor CBC and transfuse if H/H drops below 7/21.    1/31Eliquis on hold. Heparin drip not started overnight due to concern for coughing up blood. Hb at 7.6. PTT elevated at baseline 37. sats 96% on 3LNC    COPD without exacerbation  Currently without evidence of acute hypercapnic  respiratory failure, and no wheezing on exam.  We will continue home inhalers.  1/30  sats 93% on 2LNC.    Heart failure with preserved ejection fraction  No evidence of acute exacerbation.  We will monitor volume status.  Patient admitted with signs and symptoms of CHF exacerbation. Continue with IV lasix.    Results for orders placed or performed during the hospital encounter of 01/29/24   Brain natriuretic peptide   Result Value Ref Range     (H) 0 - 99 pg/mL    serial troponins.  Cardiac Enzymes trend  Recent Labs   Lab 01/29/24  1836   TROPONINI 0.018     Results for orders placed during the hospital encounter of 10/09/23    Echo    Interpretation Summary    Left Ventricle: The left ventricle is normal in size. Ventricular mass is normal. Normal wall thickness. There is concentric remodeling. Normal wall motion. There is normal systolic function. Ejection fraction by visual approximation is 65%. Grade II diastolic dysfunction.    Left Atrium: Left atrium is severely dilated.    Right Ventricle: Normal right ventricular cavity size. Wall thickness is normal. Right ventricle wall motion  is normal. Systolic function is normal.    Aortic Valve: The aortic valve is a trileaflet valve. There is moderate aortic valve sclerosis. There is mild annular calcification present. There is restricted motion of the left coronary cusp.    Mitral Valve: There is bileaflet sclerosis. Moderately calcified subvalvular apparatus. Mildly restricted motion. There is mild to moderate regurgitation.    Tricuspid Valve: There is mild regurgitation.    Pulmonary Artery: The estimated pulmonary artery systolic pressure is 42 mmHg.    IVC/SVC: Normal venous pressure at 3 mmHg.         Vascular Dementia  Per daughter at bedside, recently with worsening dementia with waxing and waning mental status.  We will continue home Seroquel nightly along with delirium precautions.  1/30 CT head -No acute intracranial process.  Significant motion  artifact may diminish the sensitivity. Involutional changes with chronic microvascular ischemic changes.  Small remote lacunar infarct of the right cerebellum and right corona radiata.    Immunocompromised state due to drug therapy  1/30 continue plaquenil.  Hold home CellCept .       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  Continue supportive care and frequent turns.      Multinodular goiter  1/30  CT chest - .  Enlarged multinodular thyroid gland with complex hypoattenuating substernal mass thought to be of thyroid origin.  This mass was present previously but now causes more mass effect, with new narrowing of the right mainstem bronchus.  Malignancy is not excluded. Pulmonology consulted, TSH WNL .  Goiter continues to enlarge and may one day compromise her airway. palliative care consulted for discussion with daughters.  1/31   Endocrinology consulted for multinodular goiter.  Discussed with the daughter at the bedside.      UTI (urinary tract infection)  1/30 UA + . UC pending. continue cefepime  1/31 UC - GNR. Identification and susceptibility pending.       Thrombocytopenia  Patient was found to have thrombocytopenia, monitor .  Recent Labs   Lab 01/29/24  1836   *         AF (paroxysmal atrial fibrillation)  Currently rate controlled.   1/31 Eliquis on hold. Heparin drip not started overnight due to concerns for bloody stains on gown. Per staff no witnessed hemoptysis. Hb at 7.6-->8.3 . PTT elevated at baseline 37. started on heparin drip.     Oropharyngeal dysphagia  Noted in the past, however not on specific dysphagia diet per daughter.  Her right sided pneumonia may be secondary to aspiration as she is at high risk for this.  May consider speech therapy consultation.  1/30 SLP consulted to rule out aspiration        Acute respiratory failure with hypoxia  [facility] with increased tachypnea  and hypoxia.  Recently diagnosed with pneumonia last week and started on oral antibiotics along with oxygen.  PO2 is decreased on ABG, however appears to be chronically decreased.  Suspect pneumonia on chronic interstitial lung disease from rheumatoid arthritis.  We will continue oxygen to maintain sats 88% or greater.  Low suspicion for PE given chronic Eliquis use.  1/30  Recently diagnosed with pneumonia last week and started on oral antibiotics along with oxygen.  sats 93% on 2LNC. pneumonia vs  chronic interstitial lung disease from rheumatoid arthritis.   Large pleural effusion noted on chest x-ray, CT chest -  Moderate volume right-sided pleural effusion with adjacent compressive atelectasis.     Rheumatoid arthritis of multiple sites  Continue home Plaquenil and prednisone.  Hold home CellCept given acute infection.        VTE Risk Mitigation (From admission, onward)           Ordered     apixaban tablet 5 mg  2 times daily         02/02/24 1401                    Discharge Planning   LETICIA: 2/3/2024     Code Status: DNR   Is the patient medically ready for discharge?:     Reason for patient still in hospital (select all that apply): Patient trending condition  Discharge Plan A: Return to nursing home                  Baldomero Galvan MD  Department of Hospital Medicine   [facility] - [facility] ([place]

## 2024-02-04 NOTE — ASSESSMENT & PLAN NOTE
Patient has complex medical issues deemed to be declining.  She is unable to make decisions for herself and family has opted for DNR and discharged to [facility] with hospice care.  I discussed with daughter via phone, we will aim to focus on comfort care at this time and opt for medications that will improve quality of life.  She is Currently on IV antibiotics for UTI and presumed Pneumonia, we will transitioned to oral antibiotics upon discharge.  Continue oxygen.  We will continue home medication including steroids, had discussion with daughter re steroids use, decided to continue for now to avoid flare ups and thus improve quality of life.  Plan is to discharge to nursing home with hospice on Monday due to staffing issues at this time.    2/4/24  We will do Haldol p.r.n. for severe agitation.  Plan is for discharge to nursing home with hospice tomorrow

## 2024-02-04 NOTE — NURSING
Pt is AAOX1, pt is restless, anxious irritated, agitated, fall risk reviewed and comfort measures applied with interventions, pt denies pain. MD made aware. Awaiting on med order.   show

## 2024-02-04 NOTE — PLAN OF CARE
Problem: Attention and Thought Clarity Impairment (Delirium)  Goal: Improved Attention and Thought Clarity  Outcome: Ongoing, Progressing     Problem: Adult Inpatient Plan of Care  Goal: Plan of Care Review  Outcome: Ongoing, Progressing  Flowsheets (Taken 2/3/2024 1931)  Plan of Care Reviewed With: patient  Goal: Patient-Specific Goal (Individualized)  Outcome: Ongoing, Progressing  Goal: Absence of Hospital-Acquired Illness or Injury  Outcome: Ongoing, Progressing  Goal: Optimal Comfort and Wellbeing  Outcome: Ongoing, Progressing     Problem: Fluid Imbalance (Pneumonia)  Goal: Fluid Balance  Outcome: Ongoing, Progressing

## 2024-02-05 PROBLEM — N39.0 UTI (URINARY TRACT INFECTION): Status: RESOLVED | Noted: 2018-12-29 | Resolved: 2024-01-01

## 2024-02-05 NOTE — DISCHARGE SUMMARY
[facility] ([place] [address])  [facility] Medicine  Discharge Summary      Patient Name: Selma Lux  MRN: [id number]  Admission Date: 1/29/2024  Hospital Length of Stay: 7 days  Discharge Date and Time:  02/05/2024 11:00 AM  Attending Physician: Baldomero Galvan MD   Discharging Provider: Baldomero Galvan MD  Discharge Provider Team: [facility] MED D  Primary Care Provider: Génesis Rosario MD            * No surgery found *      Hospital Course: 88 y.o. female with PMH of RA with ILD on Cellcept and prednisone,  history of CVA with resultant vascular dementia, L hemiparesis, and debility/bedbound status, AFib, HTN, HFpEF who presented to hospital with hypoxia. 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  This mass was present previously but now causes more mass effect, with new narrowing of the right mainstem bronchus.   Patient has complex medical issues deemed to be declining.  She is unable to make decisions for herself and family has opted for DNR and discharge to [facility] with hospice care.  I discussed with daughter via phone, we will aim to focus on comfort care at this time and opt for medications that will improve quality of life.  She received IV antibiotics for UTI and presumed Pneumonia and was  transitioned to oral augemntin for 5 days upon discharge.  Continue oxygen and home medications that will not impede quality of life. Discussed steroid use with daughter and decision made to continue for now to avoid flare ups and thus improve quality of life.  She was discharged to [facility] with hospice       Physical Exam  Constitutional:       General: She is not in acute distress.  HENT:      Head: Normocephalic.      Right Ear: External ear normal.      Left Ear: External ear normal.      Nose: Nose normal.      Comments: Nasal cannula  Cardiovascular:       Rate and Rhythm: Normal rate.   Pulmonary:      Comments: Decreased air entry to right lung field  Abdominal:      Palpations: Abdomen is soft.      Tenderness: There is no abdominal tenderness.   Musculoskeletal:      Right lower leg: No edema.      Left lower leg: No edema.      Comments: Debility   Neurological:      Mental Status: She is alert. Mental status is at baseline.        Consults:   Consults (From admission, onward)          Status Ordering Provider     Inpatient consult to Endocrinology  Once        Provider:  (Not yet assigned)    Completed NADJA CASTREJON     Inpatient consult to Palliative Care  Once        Provider:  (Not yet assigned)    Completed NADJA CASTREJON     Inpatient consult to Interventional Radiology  Once        Provider:  (Not yet assigned)    Completed NADJA CASTREJON     Inpatient consult to Pulmonology  Once        Provider:  (Not yet assigned)    Completed NADJA CASTREJON     Pharmacy to dose Vancomycin consult  Once        Provider:  (Not yet assigned)   See Arelis for full Linked Orders Report.    Acknowledged ADAN ROTH            Final Active Diagnoses:    Diagnosis Date Noted POA    PRINCIPAL PROBLEM:  ACP (advance care planning) [Z71.89] 01/31/2024 Not Applicable    Palliative care encounter [Z51.5] 01/31/2024 Not Applicable    Pleural effusion [J90] 01/29/2024 Yes     Chronic    Sepsis due to pneumonia [J18.9, A41.9] 10/10/2023 Yes    Anemia [D64.9] 10/09/2023 Yes    COPD without exacerbation [J44.9] 09/07/2023 Yes     Chronic    Heart failure with preserved ejection fraction [I50.30] 05/15/2023 Yes    Vascular Dementia [F01.A0] 01/10/2023 Yes     Chronic    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 [I69.154] 08/03/2021 Not Applicable     Chronic    Multinodular goiter [E04.2] 01/24/2019 Yes    Thrombocytopenia [D69.6] 12/16/2018 Yes     Chronic    AF (paroxysmal atrial  fibrillation) [I48.0] 06/29/2018 Yes     Chronic    Oropharyngeal dysphagia [R13.12] 04/26/2018 Yes    Acute respiratory failure with hypoxia [J96.01] 04/05/2018 Yes    Rheumatoid arthritis of multiple sites [M05.79] 10/21/2015 Yes     Chronic      Problems Resolved During this Admission:    Diagnosis Date Noted Date Resolved POA    Cough with hemoptysis [R04.2] 01/31/2024 02/02/2024 Yes    UTI (urinary tract infection) [N39.0] 12/29/2018 02/05/2024 Yes      Discharged Condition:  Guarded    Disposition: Hospice/Medical Facility    Follow Up:    Patient Instructions:   No discharge procedures on file.  Medications:  Reconciled Home Medications:      Medication List        CONTINUE taking these medications      albuterol-ipratropium 2.5 mg-0.5 mg/3 mL nebulizer solution  Commonly known as: DUO-NEB  Take 3 mLs by nebulization 2 (two) times daily as needed for Shortness of Breath. Rescue     amoxicillin-clavulanate 875-125mg 875-125 mg per tablet  Commonly known as: AUGMENTIN  Take 1 tablet by mouth 2 (two) times daily. for 5 days     apixaban 5 mg Tab  Commonly known as: ELIQUIS  Take 1 tablet (5 mg total) by mouth 2 (two) times daily.     atorvastatin 40 MG tablet  Commonly known as: LIPITOR  Take 1 tablet (40 mg total) by mouth every evening.     baclofen 5 mg Tab tablet  Commonly known as: LIORESAL  Take 1 tablet (5 mg total) by mouth 3 (three) times daily.     cholecalciferol (vitamin D3) 125 mcg (5,000 unit) capsule  Take 1 capsule (5,000 Units total) by mouth once daily.     ferrous sulfate, dried 160 mg (50 mg iron) Tbsr  Commonly known as: SLOW FE  Take 1 tablet (160 mg total) by mouth every other day.     fluticasone-salmeterol 100-50 mcg/dose 100-50 mcg/dose diskus inhaler  Commonly known as: ADVAIR  Inhale 1 puff into the lungs 2 (two) times daily. Controller     gabapentin 100 MG capsule  Commonly known as: NEURONTIN  Take 1 capsule (100 mg total) by mouth 3 (three) times daily.     hydroxychloroquine 200  mg tablet  Commonly known as: PLAQUENIL  Take 1 tablet (200 mg total) by mouth once daily.     LIDOcaine 5 %  Commonly known as: LIDODERM  Place 1 patch onto the skin once daily. Remove & Discard patch within 12 hours as needed for pain or as directed by MD Place patch onto lower back as needed     magnesium oxide 400 mg (241.3 mg magnesium) tablet  Commonly known as: MAG-OX  Take 400 mg by mouth once daily.     pantoprazole 40 MG tablet  Commonly known as: PROTONIX  Take 1 tablet (40 mg total) by mouth once daily.     predniSONE 10 MG tablet  Commonly known as: DELTASONE  Take 10 mg by mouth once daily.     QUEtiapine 25 MG Tab  Commonly known as: SEROQUEL  Take 1 tablet (25 mg total) by mouth every evening. Patient may request 1/2 tablet rather than full 25mg nightly.     thiamine 100 MG tablet  Take 100 mg by mouth once daily.            STOP taking these medications      famotidine 20 MG tablet  Commonly known as: PEPCID     gabapentin 5% baclofen 2% amitriptyline 2% topical cream     mycophenolate 500 mg Tab  Commonly known as: CELLCEPT     sulfamethoxazole-trimethoprim 800-160mg 800-160 mg Tab  Commonly known as: BACTRIM DS                Pending Diagnostic Studies:       None          Indwelling Lines/Drains at time of discharge:   Lines/Drains/Airways       Drain  Duration             Female External Urinary Catheter w/ Suction 02/01/24 2130 3 days                    Time spent on the discharge of patient: 40 minutes         Baldomero Galvan MD  Department of Hospital Medicine  [facility] Surg ([place]-[facility])

## 2024-02-05 NOTE — PLAN OF CARE
EBONIE placed PFC orders for patient transport to [facility]. Patient/family notified and agreeable to dc plan. Transport ETA 5:30pm. Report to be called to [phone]    EBONIE will continue to follow.                   GRANT Patel, LMSW  [facility]  Case Management  Ext. [phone]

## 2024-02-05 NOTE — PT/OT/SLP PROGRESS
Speech Language Pathology Treatment  And Discharge Summary    Patient Name:  Selma Lux   MRN:  [id number]  Admitting Diagnosis: ACP (advance care planning)    Recommendations:                 General Recommendations:  Follow-up not indicated as patient transitioning to hospice care  Diet recommendations:  Regular Diet - IDDSI Level 7, Liquid Diet Level: Full liquids, Thin, Other (Comment) (meds in nectar)   Aspiration Precautions: 1 bite/sip at a time, Assistance with meals, Eliminate distractions, Feed only when awake/alert, Frequent oral care, HOB to 90 degrees, Meds with nectar thick liquids, Monitor for s/s of aspiration, Remain upright 30 minutes post meal, Small bites/sips, and Standard aspiration precautions   General Precautions: Standard, aspiration, fall  Communication strategies:  provide increased time to answer and go to room if call light pushed    Assessment:     Selma Lux is a 88 y.o. female with an SLP diagnosis of Dysphagia.  She presents with poor oral acceptance and agitation this date.    Subjective   Spoke with RN prior to entering pt's room . Pt seen bedside for session. Calm upon ST arrival, but increasing agitation with care.      Pain/Comfort:  Pain Rating 1:  (Pt wincing, but unable to rate pain)    Respiratory Status: Nasal cannula, flow 3 L/min    Objective:     Has the patient been evaluated by SLP for swallowing?   Yes  Keep patient NPO? No   Current Respiratory Status:    nasal cannula    Pt seen for ongoing assessment of swallow function. RN present attempting to administer meds crushed in nectar thick liquids. Pt orally defensive, attempting to bite spoon and pushing RN away. Yelling out. Pt expectorated any bit of medication RN  managed to get into patient's mouth. Did not accept any further trials despite max encouragement. Session terminated as pt becoming increasingly agitated. No ST f/u required as patient transitioning to hospice care.      Goals:    Multidisciplinary Problems       SLP Goals          Problem: SLP    Goal Priority Disciplines Outcome   SLP Goal     SLP Ongoing, Progressing   Description: Speech Language Pathology Goals  Goals expected to be met by 2/13  1. Pt will participate in ongoing assessment of swallow.                                  Plan:     Patient to be seen:  3 x/week   Plan of Care expires:  02/29/24  Plan of Care reviewed with:  patient   SLP Follow-Up:  No       Discharge recommendations:  No Therapy Indicated   Barriers to Discharge:  None    Time Tracking:     SLP Treatment Date:   02/05/24  Speech Start Time:  0856  Speech Stop Time:  0906     Speech Total Time (min):  10 min    Billable Minutes: Treatment Swallowing Dysfunction      02/05/2024

## 2024-02-05 NOTE — PLAN OF CARE
Problem: Altered Behavior (Delirium)  Goal: Improved Behavioral Control  Outcome: Ongoing, Progressing     Problem: Sleep Disturbance (Delirium)  Goal: Improved Sleep  Outcome: Ongoing, Progressing     Problem: Adult Inpatient Plan of Care  Goal: Plan of Care Review  Outcome: Ongoing, Progressing  Goal: Optimal Comfort and Wellbeing  Outcome: Ongoing, Progressing     Problem: Coping Ineffective  Goal: Effective Coping  Outcome: Ongoing, Progressing     Problem: Infection (Pneumonia)  Goal: Resolution of Infection Signs and Symptoms  Outcome: Ongoing, Progressing     Problem: Fall Injury Risk  Goal: Absence of Fall and Fall-Related Injury  Outcome: Ongoing, Progressing     Problem: Skin Injury Risk Increased  Goal: Skin Health and Integrity  Outcome: Ongoing, Progressing     Problem: Confusion Chronic  Goal: Optimal Cognitive Function  Outcome: Ongoing, Progressing

## 2024-02-05 NOTE — PROGRESS NOTES
Pharmacokinetic Assessment Follow Up: IV Vancomycin    Vancomycin serum concentration assessment(s):    The trough level was drawn correctly and can be used to guide therapy at this time. The measurement is above the desired definitive target range of 15 to 20 mcg/mL.    Vancomycin Regimen Plan:    Discontinue the scheduled vancomycin regimen and re-dose when the random level is less than 20 mcg/mL, next level to be drawn at 2100 on 2/5.    Drug levels (last 3 results):  Recent Labs   Lab Result Units 02/02/24 2134 02/04/24  2214   Vancomycin-Trough ug/mL 19.7 22.5*       Pharmacy will continue to follow and monitor vancomycin.    Please contact pharmacy at extension [phone] for questions regarding this assessment.    Thank you for the consult,   Dorothy Steven       Patient brief summary:  Selma Lux is a 88 y.o. female initiated on antimicrobial therapy with IV Vancomycin for treatment of lower respiratory infection    The patient's current regimen is 750 mg every 24H, which is being discontinued.     Drug Allergies:   Review of patient's allergies indicates:  No Known Allergies    Actual Body Weight:   71.7 kg     Renal Function:   Estimated Creatinine Clearance: 39.2 mL/min (based on SCr of 1 mg/dL).,     Dialysis Method (if applicable):  N/A    CBC (last 72 hours):  Recent Labs   Lab Result Units 02/02/24 0421 02/03/24 0251 02/04/24  0329   WBC K/uL 11.13 10.99 15.07*   Hemoglobin g/dL 7.8* 7.7* 8.7*   Hematocrit % 25.3* 25.3* 27.9*   Platelets K/uL 119* 119* 137*   Gran % % 53.1 55.6 52.0   Lymph % % 19.9 15.9* 19.5   Mono % % 23.7* 26.4* 25.5*   Eosinophil % % 2.0 0.8 1.7   Basophil % % 0.1 0.1 0.1   Differential Method  Automated Automated Automated       Metabolic Panel (last 72 hours):  Recent Labs   Lab Result Units 02/02/24 0421 02/03/24 0251 02/04/24 2214   Sodium mmol/L 139 137  --    Potassium mmol/L 4.0 3.9  --    Chloride mmol/L 112* 111*  --    CO2 mmol/L 20* 19*  --    Glucose mg/dL  61* 76  --    BUN mg/dL 11 14  --    Creatinine mg/dL 0.8 0.9 1.0   Albumin g/dL 2.4* 2.5*  --    Total Bilirubin mg/dL 1.3* 1.1*  --    Alkaline Phosphatase U/L 47* 51*  --    AST U/L 9* 8*  --    ALT U/L 9* 5*  --    Magnesium mg/dL 1.5* 1.5*  --        Vancomycin Administrations:  vancomycin given in the last 96 hours                     vancomycin 750 mg in dextrose 5 % (D5W) 250 mL IVPB (Vial-Mate) (mg) 750 mg New Bag 02/03/24 2241     750 mg New Bag 02/02/24 2218     750 mg New Bag 02/01/24 2251                    Microbiologic Results:  Microbiology Results (last 7 days)       Procedure Component Value Units Date/Time    Blood culture x two cultures. Draw prior to antibiotics. [[id number]] Collected: 01/29/24 1843    Order Status: Completed Specimen: Blood from Peripheral, Forearm, Right Updated: 02/03/24 2012     Blood Culture, Routine No growth after 5 days.    Narrative:      Aerobic and anaerobic    Blood culture x two cultures. Draw prior to antibiotics. [[id number]] Collected: 01/29/24 1835    Order Status: Completed Specimen: Blood from Peripheral, Hand, Right Updated: 02/03/24 2012     Blood Culture, Routine No growth after 5 days.    Narrative:      Aerobic and anaerobic    Urine culture [[id number]]  (Abnormal)  (Susceptibility) Collected: 01/30/24 0334    Order Status: Completed Specimen: Urine Updated: 02/02/24 0944     Urine Culture, Routine PROTEUS MIRABILIS  50,000 - 99,999 cfu/ml        ESCHERICHIA COLI ESBL  50,000 - 99,999 cfu/ml      Narrative:      Specimen Source->Urine    Culture, Respiratory with Gram Stain [[id number]]     Order Status: No result Specimen: Respiratory     Aerobic culture [[id number]]     Order Status: No result Specimen: Pleural Fluid     Culture, Anaerobic [[id number]]     Order Status: No result Specimen: Pleural Fluid     Gram stain [[id number]]     Order Status: No result Specimen: Pleural Fluid     AFB Culture & Smear [[id number]]     Order Status: No result  Specimen: Pleural Fluid     Fungus culture [[id number]]     Order Status: No result Specimen: Pleural Fluid     Culture, MRSA [[id number]]     Order Status: No result Specimen: MRSA source from Nares, Right     Influenza A & B by Molecular [[id number]] Collected: 01/29/24 1838    Order Status: Completed Specimen: Nasopharyngeal Swab Updated: 01/29/24 1931     Influenza A, Molecular Negative     Influenza B, Molecular Negative     Flu A & B Source NP

## 2024-02-05 NOTE — PLAN OF CARE
Discharge Plan A and Plan B have been determined by review of patient's clinical status, future medical and therapeutic needs, and coverage/benefits for post-acute care in coordination with multidisciplinary team members.    02/05/24 1323   Post-Acute Status   Post-Acute Authorization Hospice;Placement   Post-Acute Placement Status Pending post-acute provider review/more information requested   Hospice Status Referrals Sent   Coverage MEDICARE - MEDICARE PART A & B -   Discharge Plan   Discharge Plan A Return to nursing home   Discharge Plan B Return to Nursing Home;Hospice/home     SW reviewed discharge recommendation of Hospice/home w/ patient/family and is agreeable to plan. [facility] to return [facility] w/ hospice/home.    Patient/family provided list of facilities in-network with patient's payor plan. Providers that are owned, operated, or affiliated with [facility] are included on the list.     Notified that referral sent to below listed facilities from in-network list based on proximity to home/family support:   [facility]    Patient/family instructed to identify preference.    Preferred Facility: (if more than 1, listed in order of descending preference)  1.[facility]    If an additional preferred facility not listed above is identified, additional referral to be sent. If above facilities unable to accept, will send additional referrals to in-network providers.            3:30pm  EBONIE received notification that [facility] accepts patient for hospice services upon dc to [facility]. SW submitted orders and additional requested information to [facility] via [employer]. EBONIE phoned NH to notify of the above. Per Adelaida, NH has received all requested information. Patient accepted to return to [facility]  w/ [facility]. Transport orders to follow.    SW will continue to follow.                      Caio Bennett, MSW, LMSW  [facility]  Case  Management  Ext. [phone]

## 2024-02-05 NOTE — NURSING
No UO noted from 2115H-0530H. Bladder scan done and showed 211mL. Upon finishing bladder scan, pt urinated about 100mL via purewick.

## 2024-02-05 NOTE — CARE UPDATE
RAPID RESPONSE NURSE PROACTIVE ROUNDING NOTE       Time of Visit: 830    Admit Date: 2024  LOS: 7  Code Status: DNR   Date of Visit: 2024  : 1935  Age: 88 y.o.  Sex: female  Race: Black or   Bed: 28813/31647 A:   MRN: [id number]  Was the patient discharged from an ICU this admission? No   Was the patient discharged from a PACU within last 24 hours? No   Did the patient receive conscious sedation/general anesthesia in last 24 hours? No  Was the patient in the ED within the past 24 hours? No  Was the patient on NIPPV within the past 24 hours? No   Attending Physician: Baldomero Galvan MD  Primary Service: [facility] MED D   Time spent at the bedside: < 15 min    SITUATION    Notified by inZair patient alert.  Reason for alert: tachypnea  Called to evaluate the patient for Respiratory    BACKGROUND     Why is the patient in the hospital?: ACP (advance care planning)    Patient has a past medical history of Acute cystitis without hematuria, Acute hypoxemic respiratory failure, Acute respiratory failure with hypoxia, KERMIT (acute kidney injury), Altered mental status, Anemia, Arthritis, Bilateral hand pain, Branch retinal vein occlusion, left eye, Chronic bilateral low back pain without sciatica, Chronic renal failure in pediatric patient, stage 3 (moderate), Chronic renal failure syndrome, stage 3 (moderate), Chronic systolic (congestive) heart failure, Cognitive communication deficit, COPD exacerbation, Cystoid macular edema, left eye, Cystoid macular edema, left eye, Delirium, DJD (degenerative joint disease) of cervical spine, Enterococcal bacteremia, Fatty liver, Goiter, Hashimoto's disease, Hemichorea, History of 2019 novel coronavirus disease (COVID-19), Hypertension, Hypertensive encephalopathy, IBS (irritable bowel syndrome), IGT (impaired glucose tolerance), Intracranial subdural hematoma, Iron deficiency anemia secondary to inadequate dietary iron intake, Joint pain, Keratoconjunctivitis  "sicca of both eyes not specified as Sjogren's, Leiomyoma of uterus, unspecified, Long QT interval, Macular edema, Multinodular goiter, Neuropathy, Plaquenil causing adverse effect in therapeutic use, Pneumococcal vaccine refused, Pneumonia due to Streptococcus pneumoniae, Poor nutrition, Primary osteoarthritis involving multiple joints, RA (rheumatoid arthritis), Retinal macroaneurysm of left eye, s/p Right Total knee replacement, Scoliosis of thoracic spine, Small vessel disease, cerebrovascular, Stroke, Traumatic subdural hemorrhage with loss of consciousness of unspecified duration, initial encounter, UTI (urinary tract infection), and Vascular dementia.    Last Vitals:  Temp: 97.8 °F (36.6 °C) (02/05 0820)  Pulse: 107 (02/05 0820)  Resp: 32 (02/05 0820)  BP: 164/79 (02/05 0820)  SpO2: 92 % (02/05 0820)    24 Hours Vitals Range:  Temp:  [97 °F (36.1 °C)-98.5 °F (36.9 °C)]   Pulse:  []   Resp:  [18-32]   BP: (132-173)/(61-99)   SpO2:  [92 %-100 %]     Labs:  Recent Labs     02/03/24  0251 02/04/24  0329   WBC 10.99 15.07*   HGB 7.7* 8.7*   HCT 25.3* 27.9*   * 137*       Recent Labs     02/03/24  0251 02/04/24  2214     --    K 3.9  --    *  --    CO2 19*  --    BUN 14  --    CREATININE 0.9 1.0   GLU 76  --    MG 1.5*  --         No results for input(s): "PH", "PCO2", "PO2", "HCO3", "POCSATURATED", "BE" in the last 72 hours.     ASSESSMENT    Patient resting comfortably in bed. Respirations even and unlabored. No apparent respiratory distress noted. Able to maintain SPO2 > 89% on 3L/NC    INTERVENTIONS    The patient was seen for Respiratory problem. Staff concerns included tachypnea. The following interventions were performed: no additional interventions needed at this time.    RECOMMENDATIONS    - Continue current plan of care  - Continuous cardiac monitoring  - maintain IV access    PROVIDER ESCALATION    Yes/No  No    Orders received and case discussed with NA.    Disposition: Remain in " room 98516.    FOLLOW-UP    Charge Thelma SANDERS  updated on plan of care. Instructed to call the Rapid Response Nurse, Shayna Wellington RN at [phone] for additional questions or concerns.

## 2024-02-05 NOTE — PLAN OF CARE
NURSING HOME WITH HOSPICE ORDERS    02/05/2024  [facility]  [facility] - MED SURG ([facility])  [address]  [facility] [address]  Dept: [phone]  Loc: [phone]     Admit to Nursing Home With Hospice      Diagnoses:  Active Hospital Problems    Diagnosis  POA    *ACP (advance care planning) [Z71.89]  Not Applicable    Palliative care encounter [Z51.5]  Not Applicable    Pleural effusion [J90]  Yes     Chronic    Sepsis due to pneumonia [J18.9, A41.9]  Yes    Anemia [D64.9]  Yes    COPD without exacerbation [J44.9]  Yes     Chronic    Heart failure with preserved ejection fraction [I50.30]  Yes    Vascular Dementia [F01.A0]  Yes     Chronic     -neurology assessment 7/30/2018, 8/18/2020  -9/8/2017 Neuropsychiatry note Dr. Lagos      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 [I69.154]  Not Applicable     Chronic     -CVA circa June 2022 with extended post stroke rehab at [facility]      Multinodular goiter [E04.2]  Yes    Thrombocytopenia [D69.6]  Yes     Chronic     -monitor periodically w/ CBC      AF (paroxysmal atrial fibrillation) [I48.0]  Yes     Chronic     -followed by Cardiology, on Eliquis  -PAF noted on admission circa 4/2/2018 for respiratory failure (PNA +/- )      Oropharyngeal dysphagia [R13.12]  Yes    Acute respiratory failure with hypoxia [J96.01]  Yes    Rheumatoid arthritis of multiple sites [M05.79]  Yes     Chronic     -Dx 2012 with Dr No, then Kiera  HCQ since 2012  Prednisone 5 mg/d since 2012 previously (doses changed at times due to other conditions, pulmonary)  Humira one dose April 2018 followed by ICU admission for pnemonia and resp failure    -Managed by rheumatology          Resolved Hospital Problems    Diagnosis Date Resolved POA    Cough with hemoptysis [R04.2] 02/02/2024 Yes    UTI (urinary tract infection) [N39.0] 02/05/2024 Yes       Patient is homebound due to:  ACP  (advance care planning)    Allergies:Review of patient's allergies indicates:  No Known Allergies    Vitals:  Routine    Diet: regular diet    Activities:   Activity as tolerated    Goals of Care Treatment Preferences:  Code Status: DNR    Living Will: Yes     What is most important right now is to focus on extending life as long as possible, even it it means sacrificing quality, curative/life-prolongation (regardless of treatment burdens).  Accordingly, we have decided that the best plan to meet the patient's goals includes continuing with treatment.          Nursing Precautions:  Pressure ulcer prevention          Medications: Discontinue all previous medication orders, if any. See new list below.     Medication List        CONTINUE taking these medications      albuterol-ipratropium 2.5 mg-0.5 mg/3 mL nebulizer solution  Commonly known as: DUO-NEB  Take 3 mLs by nebulization 2 (two) times daily as needed for Shortness of Breath. Rescue     amoxicillin-clavulanate 875-125mg 875-125 mg per tablet  Commonly known as: AUGMENTIN  Take 1 tablet by mouth 2 (two) times daily. for 5 days     apixaban 5 mg Tab  Commonly known as: ELIQUIS  Take 1 tablet (5 mg total) by mouth 2 (two) times daily.     atorvastatin 40 MG tablet  Commonly known as: LIPITOR  Take 1 tablet (40 mg total) by mouth every evening.     baclofen 5 mg Tab tablet  Commonly known as: LIORESAL  Take 1 tablet (5 mg total) by mouth 3 (three) times daily.     cholecalciferol (vitamin D3) 125 mcg (5,000 unit) capsule  Take 1 capsule (5,000 Units total) by mouth once daily.     ferrous sulfate, dried 160 mg (50 mg iron) Tbsr  Commonly known as: SLOW FE  Take 1 tablet (160 mg total) by mouth every other day.     fluticasone-salmeterol 100-50 mcg/dose 100-50 mcg/dose diskus inhaler  Commonly known as: ADVAIR  Inhale 1 puff into the lungs 2 (two) times daily. Controller     gabapentin 100 MG capsule  Commonly known as: NEURONTIN  Take 1 capsule (100 mg total) by  mouth 3 (three) times daily.     hydroxychloroquine 200 mg tablet  Commonly known as: PLAQUENIL  Take 1 tablet (200 mg total) by mouth once daily.     LIDOcaine 5 %  Commonly known as: LIDODERM  Place 1 patch onto the skin once daily. Remove & Discard patch within 12 hours as needed for pain or as directed by MD Place patch onto lower back as needed     magnesium oxide 400 mg (241.3 mg magnesium) tablet  Commonly known as: MAG-OX  Take 400 mg by mouth once daily.     pantoprazole 40 MG tablet  Commonly known as: PROTONIX  Take 1 tablet (40 mg total) by mouth once daily.     predniSONE 10 MG tablet  Commonly known as: DELTASONE  Take 10 mg by mouth once daily.     QUEtiapine 25 MG Tab  Commonly known as: SEROQUEL  Take 1 tablet (25 mg total) by mouth every evening. Patient may request 1/2 tablet rather than full 25mg nightly.     thiamine 100 MG tablet  Take 100 mg by mouth once daily.            STOP taking these medications      famotidine 20 MG tablet  Commonly known as: PEPCID     gabapentin 5% baclofen 2% amitriptyline 2% topical cream     mycophenolate 500 mg Tab  Commonly known as: CELLCEPT     sulfamethoxazole-trimethoprim 800-160mg 800-160 mg Tab  Commonly known as: BACTRIM DS                Immunizations Administered as of 2/5/2024       Name Date Dose VIS Date Route Exp Date    COVID-19, MRNA, LN-S, PF (Pfizer) (Purple Cap) 9/10/2021 10:14 AM 0.3 mL 12/12/2020 Intramuscular 10/31/2021    Site: Left deltoid     Given By: Cely Valencia RN     : [employer]     Lot: [id number]     COVID-19, MRNA, LN-S, PF (Pfizer) (Purple Cap) 1/30/2021  2:24 PM 0.3 mL 12/12/2020 Intramuscular 5/31/2021    Site: Right deltoid     Given By: Jonah Pierre MA     : [employer]     Lot: [id number]     COVID-19, MRNA, LN-S, PF (Pfizer) (Purple Cap) 1/9/2021  1:40 PM 0.3 mL 12/12/2020 Intramuscular 4/30/2021    Site: Left arm     Given By: Isaac Rubio RN     : [employer]     Lot: [id number]                Some patients may experience side effects after vaccination.  These may include fever, headache, muscle or joint aches.  Most symptoms resolve with 24-48 hours and do not require urgent medical evaluation unless they persist for more than 72 hours or symptoms are concerning for an unrelated medical condition.          _________________________________  Baldomero Galvan MD  02/05/2024

## 2024-02-05 NOTE — NURSING
Pt agitated and restless, found trying to get out of the bed by putting her right leg off the side rail, pt crying and shouting inaudible words. Pt non-redirectable/uncooperative. Pt trying to keep staff off of her by swaying her right arm multiple times. Pt trying to pull out IV tubing and purewick. Pt took out telemetry stickers and nasal cannula. Pt spits out crushed medicine in pudding and apple sauce on herself/all over her gown. Haloperidol PRN given.

## 2024-02-05 NOTE — PLAN OF CARE
Problem: Adult Inpatient Plan of Care  Goal: Plan of Care Review  Outcome: Ongoing, Progressing  Flowsheets (Taken 2/4/2024 1913)  Plan of Care Reviewed With: patient  Goal: Patient-Specific Goal (Individualized)  Outcome: Ongoing, Progressing  Goal: Optimal Comfort and Wellbeing  Outcome: Ongoing, Progressing     Problem: Respiratory Compromise (Pneumonia)  Goal: Effective Oxygenation and Ventilation  Outcome: Ongoing, Progressing     Problem: Fall Injury Risk  Goal: Absence of Fall and Fall-Related Injury  Outcome: Ongoing, Progressing  Intervention: Identify and Manage Contributors  Flowsheets (Taken 2/4/2024 1913)  Medication Review/Management: medications reviewed     Problem: Skin Injury Risk Increased  Goal: Skin Health and Integrity  Outcome: Ongoing, Progressing  Intervention: Optimize Skin Protection  Flowsheets (Taken 2/4/2024 1913)  Head of Bed (HOB) Positioning: HOB elevated  Pt is AAOX1, pt is restless, anxious irritated, agitated,prn med given. Vital signs stable, Puric in place, turned pt Q2. Bed is in the lowest position, call light in reach, fall risk reviewed and comfort measures utilized with interventions, pt denies pain and discomfort at this time. Plan of care ongoing.

## 2024-02-05 NOTE — PLAN OF CARE
[facility]  Department of Hospital Medicine  [address] [place] [address]  [phone] after hours  [phone] fax    HOSPICE  ORDERS    02/05/2024    Admit to Nursing home Hospice:      Diagnoses:   Active Hospital Problems    Diagnosis  POA    *ACP (advance care planning) [Z71.89]  Not Applicable    Palliative care encounter [Z51.5]  Not Applicable    Pleural effusion [J90]  Yes     Chronic    Sepsis due to pneumonia [J18.9, A41.9]  Yes    Anemia [D64.9]  Yes    COPD without exacerbation [J44.9]  Yes     Chronic    Heart failure with preserved ejection fraction [I50.30]  Yes    Vascular Dementia [F01.A0]  Yes     Chronic     -neurology assessment 7/30/2018, 8/18/2020  -9/8/2017 Neuropsychiatry note Dr. Lagos      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 [I69.154]  Not Applicable     Chronic     -CVA circa June 2022 with extended post stroke rehab at [facility]      Multinodular goiter [E04.2]  Yes    Thrombocytopenia [D69.6]  Yes     Chronic     -monitor periodically w/ CBC      AF (paroxysmal atrial fibrillation) [I48.0]  Yes     Chronic     -followed by Cardiology, on Eliquis  -PAF noted on admission circa 4/2/2018 for respiratory failure (PNA +/- )      Oropharyngeal dysphagia [R13.12]  Yes    Acute respiratory failure with hypoxia [J96.01]  Yes    Rheumatoid arthritis of multiple sites [M05.79]  Yes     Chronic     -Dx 2012 with Dr No, rafal Qureshi  HCQ since 2012  Prednisone 5 mg/d since 2012 previously (doses changed at times due to other conditions, pulmonary)  Humira one dose April 2018 followed by ICU admission for pnemonia and resp failure    -Managed by rheumatology          Resolved Hospital Problems    Diagnosis Date Resolved POA    Cough with hemoptysis [R04.2] 02/02/2024 Yes    UTI (urinary tract infection) [N39.0] 02/05/2024 Yes       Hospice Qualifying Diagnoses:        Patient  has a life expectancy < 6 months due to:  Primary Hospice Diagnosis:  Hypoxic respiratory failure secondary to pleural effusion and mass compressing right main bronchus    Vital Signs: Routine per Hospice Protocol.    Code Status: DNR    Allergies: Review of patient's allergies indicates:  No Known Allergies    Diet: as tolerated    Activities: As tolerated    Goals of Care Treatment Preferences:  Code Status: DNR    Living Will: Yes     What is most important right now is to focus on extending life as long as possible, even it it means sacrificing quality, curative/life-prolongation (regardless of treatment burdens).  Accordingly, we have decided that the best plan to meet the patient's goals includes continuing with treatment.      Nursing: Per Hospice Routine.       Oxygen: as needed      Medication List        CONTINUE taking these medications      albuterol-ipratropium 2.5 mg-0.5 mg/3 mL nebulizer solution  Commonly known as: DUO-NEB  Take 3 mLs by nebulization 2 (two) times daily as needed for Shortness of Breath. Rescue     amoxicillin-clavulanate 875-125mg 875-125 mg per tablet  Commonly known as: AUGMENTIN  Take 1 tablet by mouth 2 (two) times daily. for 5 days     apixaban 5 mg Tab  Commonly known as: ELIQUIS  Take 1 tablet (5 mg total) by mouth 2 (two) times daily.     atorvastatin 40 MG tablet  Commonly known as: LIPITOR  Take 1 tablet (40 mg total) by mouth every evening.     baclofen 5 mg Tab tablet  Commonly known as: LIORESAL  Take 1 tablet (5 mg total) by mouth 3 (three) times daily.     cholecalciferol (vitamin D3) 125 mcg (5,000 unit) capsule  Take 1 capsule (5,000 Units total) by mouth once daily.     ferrous sulfate, dried 160 mg (50 mg iron) Tbsr  Commonly known as: SLOW FE  Take 1 tablet (160 mg total) by mouth every other day.     fluticasone-salmeterol 100-50 mcg/dose 100-50 mcg/dose diskus inhaler  Commonly known as: ADVAIR  Inhale 1 puff into the lungs 2 (two) times daily. Controller      gabapentin 100 MG capsule  Commonly known as: NEURONTIN  Take 1 capsule (100 mg total) by mouth 3 (three) times daily.     hydroxychloroquine 200 mg tablet  Commonly known as: PLAQUENIL  Take 1 tablet (200 mg total) by mouth once daily.     LIDOcaine 5 %  Commonly known as: LIDODERM  Place 1 patch onto the skin once daily. Remove & Discard patch within 12 hours as needed for pain or as directed by MD Place patch onto lower back as needed     magnesium oxide 400 mg (241.3 mg magnesium) tablet  Commonly known as: MAG-OX  Take 400 mg by mouth once daily.     pantoprazole 40 MG tablet  Commonly known as: PROTONIX  Take 1 tablet (40 mg total) by mouth once daily.     predniSONE 10 MG tablet  Commonly known as: DELTASONE  Take 10 mg by mouth once daily.     QUEtiapine 25 MG Tab  Commonly known as: SEROQUEL  Take 1 tablet (25 mg total) by mouth every evening. Patient may request 1/2 tablet rather than full 25mg nightly.     thiamine 100 MG tablet  Take 100 mg by mouth once daily.            STOP taking these medications      famotidine 20 MG tablet  Commonly known as: PEPCID     gabapentin 5% baclofen 2% amitriptyline 2% topical cream     mycophenolate 500 mg Tab  Commonly known as: CELLCEPT     sulfamethoxazole-trimethoprim 800-160mg 800-160 mg Tab  Commonly known as: BACTRIM DS                    Future Orders:  Hospice Medical Director may dictate new orders for comfortable care measures & sign death certificate.        _________________________________  Baldomero Galvan MD  02/05/2024

## 2024-02-06 NOTE — NURSING
Pt is alert to self, report given to receiving nurse, [facility] hospice/home on 3 L of O2

## 2024-02-06 NOTE — PLAN OF CARE
[facility] - Med [facility] ([facility])  Discharge Final Note    Primary Care Provider: Génesis Rosario MD    Expected Discharge Date: 2/5/2024    Final Discharge Note (most recent)       Final Note - 02/06/24 0903          Final Note    Assessment Type Final Discharge Note (P)      Anticipated Discharge Disposition Hospice/Home (P)    [facility]    What phone number can be called within the next 1-3 days to see how you are doing after discharge? [phone] (P)         Post-Acute Status    Post-Acute Authorization Placement;Hospice (P)      Post-Acute Placement Status Set-up Complete/Auth obtained (P)      Hospice Status Set-up Complete/Auth obtained (P)      Coverage MEDICARE - MEDICARE PART A & B - (P)                      Important Message from Medicare               Patient dc to [facility].                          GRANT Patel, LMSW  [facility]  Case Management  Ext. [phone]

## 2024-02-07 NOTE — PHYSICIAN QUERY
PT Name: Selma Lux  MR #: [id number]     DOCUMENTATION CLARIFICATION     CDS/: Tiffany Aldrich RN CDS              Contact information:[email or web address]  This form is a permanent document in the medical record.     Query Date: February 7, 2024    By submitting this query, we are merely seeking further clarification of documentation.  Please utilize your independent clinical judgment when addressing the question(s) below.    The Medical Record contains the following   Indicators Supporting Clinical Findings Location in Medical Record     X Heart Failure documented Heart failure with preserved ejection fraction     Heart failure with preserved ejection fraction  No evidence of acute exacerbation.  We will monitor volume status.  Patient admitted with signs and symptoms of CHF exacerbation. Continue with IV lasix.    H&P, Dr Ponce, 1/29    HM, Dr Castillo, 1/30     X    Labs, 1/29    EF/Echo       X Radiology findings CXR:  Prominent opacification of the right hemithorax possibly a large pleural effusion with loculation at the periphery and apex.  Mild aeration centrally.     Right upper mediastinal mass could represent thyroid goiter with mild tracheal deviation to the left similar to prior studies.     Bilateral interstitial and patchy alveolar infiltrates, increased from the prior study.     Small effusion at the left lung base also.    CT CHEST:  CT chest -  Moderate volume right-sided pleural effusion with adjacent compressive atelectasis    Imaging, 1/29     X Subjective/Objective Respiratory Conditions Patient noted to be 85% SpO2 on room air and upon arrival of EMS     She has rales    Respiratory: cough, shortness of breath.      Acute respiratory failure with hypoxia    ED, Dr Marcano, 1/29        HM, Dr Castillo, 1/30    Recent/Current MI      Heart Transplant, LVAD      Edema, JVD      Ascites        Diuretics/Meds       X Other Treatment  Serial troponins.  Cardiac Enzymes trend      I/O   , Dr Castillo, 1/31    Other       Heart failure is a clinical diagnosis which includes symptomatic fluid retention, elevated intracardiac pressures, and/or the inability of the heart to deliver adequate blood flow.    Heart Failure with reduced Ejection Fraction (HFrEF) or Systolic Heart Failure (loses ability to contract normally, EF is <40%)    Heart Failure with preserved Ejection Fraction (HFpEF) or Diastolic Heart Failure (stiff ventricles, does not relax properly, EF is >50%)     Heart Failure with Combined Systolic and Diastolic Failure (stiff ventricles, does not relax properly and EF is <50%)    Mid-range or mildly reduced ejection fraction (HFmrEF) is classified as systolic heart failure.  Congestive heart failure with a recovered EF is classified as Diastolic Heart Failure.  Common clues to acute exacerbation:  Rapidly progressive symptoms (w/in 2 weeks of presentation), using IV diuretics, using supplemental O2, pulmonary edema on Xray, new or worsening pleural effusion, +JVD or other signs of volume overload, MI w/in 4 weeks, and/or BNP >500  The clinical guidelines noted are only system guidelines, and do not replace the providers clinical judgment.    Provider, please specify the acuity of the Heart failure with preserved ejection fraction associated with the above clinical findings.    [  x ]  Acute on Chronic Diastolic Heart Failure (HFpEF) - worsening of CHF signs/symptoms in preexisting CHF     [   ]  Chronic Diastolic Heart Failure (HFpEF) - preexisting and stable     [   ]  Other (please specify): ___________________________________         Please document in your progress notes daily for the duration of treatment until resolved and include in your discharge summary.    References:  American Heart Association editorial staff. (2017, May). Ejection Fraction Heart Failure Measurement. American Heart Association.  https://www.heart.org/en/health-topics/heart-failure/diagnosing-heart-failure/ejection-fraction-heart-failure-measurement#:~:text=Ejection%20fraction%20(EF)%20is%20a,pushed%20out%20with%20each%20heartbeat  DELL Sandoval (2020, December 15). Heart failure with preserved ejection fraction: Clinical manifestations and diagnosis. [employer]. https://www.[employer].INXPO/contents/heart-failure-with-preserved-ejection-fraction-clinical-manifestations-and-diagnosis.  ICD-10-CM/PCS Coding Clinic Third Quarter ICD-10, Effective with discharges: September 8, 2020 [facility] § Heart failure with mid-range or mildly reduced ejection fraction (2020).  ICD-10-CM/PCS Coding Clinic Third Quarter ICD-10, Effective with discharges: September 8, 2020 [facility] § Heart failure with recovered ejection fraction (2020).  Form No. [id number]

## 2024-02-09 NOTE — PROGRESS NOTES
CG called SW to inform that pt passed away last evening.  She said pt's passing was peaceful and CG and her sister were able to be present.  Cg expressed gratitude for hospice services.  CG will reach out with future needs.

## 2024-11-05 NOTE — PROGRESS NOTES
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 She was seen by her PCP on 4/2 for these complaints and received a prescription for duo nebs and tessalon perles. CXR performed on 4/2 noted bilateral basilar infiltrates. At that time, she denied fever, chills, sputum production, sick contacts. She was admitted to  and treated emprically for CAP with rocephin and azithromycin. She was also diuresed daily. However, her oxygen requirements slowly increased from 2 L NC to 4 L NC with also progressively worsening bilateral infiltrates on imaging. CTA performed on 4/9 negative for PE, however noted significant bilateral consolidations. On 4/11, her hypoxemia continued to worsen, requiring NRB mask, and she developed severe respiratory distress. She was transferred to the MICU and emergently intubated. Repeat CT chest performed on 4/11 demonstrated progressive worsening of bilateral peripheral infiltrates.    Hospital Course:  Admitted to MICU on 4/11, intubated emergently for severe hypoxemia and respiratory distress. ID was consulted for assistance in management given immunocompromised state. Patient paralyzed with nimbex following persistent dysynchrony with vent despite sedation. Abx broadened to vancomycin, zosyn,  bactrim, and posaconazole. Norepinephrine initiated for BP support. Bronchoscopy performed at bedside on 4/11 and all cultures including fungal cultures negative. Nimbex discontinued on 4/12. She continued to require significant oxygen support thereafter, and was unable to wean oxygen requirements significantly since intubation. However, she has made slow improvements in her oxygenation since 4/18. She went into A-fib RVR in 120-160's on the evening of 4/17, and was briefly started on amiodarone gtt; this discontinued and metoprolol added with good rate control on 4/18. SAT/SBT continue daily with difficulty weaning her off fentanyl as she gets agitated tachycardic and hypertensive ('s). Valium initiated in effort to wean fentanyl off. Patient extubated on 4/22 to nasal cannula  4/24/2018 - Pt with chills/cold overnight & refusing meds, willing to take meds this morning. Hill placed this AM with good UOP. Pt still NPO per swallowing trial. Free water deficit 2.8L, starting with 500mL D5, will reassess, Pt oriented x3 this morning. Re-evaluate swallowing.  04/25/2018: Stepped down to hospital medicine. No acute events overnight, patient to have swallow evaluation today. Respiratory status stable, on 2L NC.   04/27/2018: No acute events overnight, patient continues to fail swallow studies. Will place NG tube today for nutrition and consult placed for general surgery for PEG placement.   04/28/2018: NG placed yesterday and now receiving tube feeds. Plan for PEG placement on Monday per interventional radiology. No acute events overnight.     Interval History: NG placed yesterday and now receiving tube feeds. Plan for PEG placement on Monday per interventional radiology. No acute events overnight.     Review of Systems   Constitutional: Negative for chills, fever and unexpected weight change.   HENT: Negative for hearing loss.    Eyes: Negative for visual disturbance.   Respiratory: Negative for shortness of  breath and wheezing.    Cardiovascular: Positive for leg swelling. Negative for chest pain.   Gastrointestinal: Negative for abdominal pain, constipation, diarrhea, nausea and vomiting.   Genitourinary: Negative for dysuria.   Musculoskeletal: Negative for arthralgias.   Skin: Negative for rash.   Neurological: Negative for seizures.   Hematological: Negative for adenopathy. Does not bruise/bleed easily.   Psychiatric/Behavioral: Negative for agitation, behavioral problems and confusion.     Objective:     Vital Signs (Most Recent):  Temp: 98.6 °F (37 °C) (04/28/18 0918)  Pulse: 92 (04/28/18 0918)  Resp: 16 (04/28/18 0918)  BP: 129/60 (04/28/18 0918)  SpO2: 95 % (04/28/18 0918) Vital Signs (24h Range):  Temp:  [97 °F (36.1 °C)-98.6 °F (37 °C)] 98.6 °F (37 °C)  Pulse:  [68-92] 92  Resp:  [14-20] 16  SpO2:  [91 %-98 %] 95 %  BP: (115-135)/(56-73) 129/60     Weight: 71.5 kg (157 lb 10.1 oz)  Body mass index is 26.23 kg/m².    Intake/Output Summary (Last 24 hours) at 04/28/18 0929  Last data filed at 04/28/18 0600   Gross per 24 hour   Intake          6107.08 ml   Output                0 ml   Net          6107.08 ml      Physical Exam   Constitutional: She is oriented to person, place, and time. She appears well-developed and well-nourished.   HENT:   Head: Normocephalic and atraumatic.   Eyes: EOM are normal. Pupils are equal, round, and reactive to light.   Neck: No tracheal deviation present. No thyromegaly present.   Cardiovascular: Normal rate.    No murmur heard.  Pulmonary/Chest: Effort normal. She has no wheezes. She has no rales.   Abdominal: Bowel sounds are normal. There is no tenderness.   Musculoskeletal: She exhibits edema (BLLE 1+). She exhibits no deformity.   Neurological: She is alert and oriented to person, place, and time. She exhibits normal muscle tone.   Skin: No rash noted. No erythema.   Vitals reviewed.      Significant Labs:   BMP:   Recent Labs  Lab 04/28/18  0439   GLU 73      K 3.2*    *   CO2 20*   BUN 18   CREATININE 0.7   CALCIUM 7.5*     CBC:   Recent Labs  Lab 04/27/18  0423   WBC 11.72   HGB 8.1*   HCT 27.2*   *       Significant Imaging: I have reviewed all pertinent imaging results/findings within the past 24 hours.    Assessment/Plan:      * Pneumonia of both lungs due to infectious organism     --see above  -- CXR 4/25- no significant change          Oropharyngeal dysphagia    - likely 2/2 prolonged intubation - 11 days  - SLP following  - failed MBSS 4/25  - NG placed, on tube feeds  - plan for IR placed PEG on Monday 05/01        Acute hypoxemic respiratory failure    --Suspect secondary to severe bilateral pneumonia in the setting of immunosuppression; possibly  vs reaction from Humira   --Severely worsened over first week of admission despite CAP treatment.  --s/p emergent intubation on 4/11 upon MICU admission for severe hypoxemia and severe respiratory distress.  --Bronchoscopy on 4/11 with wash; culture with few WBCs, no organisms.  --Antibiotics broadened to vancomycin, zosyn, bactrim, and posaconazole initally; stopped now  --Blood, sputum, and urine cultures negative from 4/10 and 4/18  --KOH prep negative, AFB stain with no acid fast bacilli noted, fungal culture negative  --Aspergillus, Histoplasma, and Cryptococcal antigen in blood is negative. Legionella and blasomyces negative.   --Extubated on 4/22  - PO pred 60 qd for possibility of , duo nebs PRN for wheezing  - On Bactrim for PJP PPx currently  - Pt currently on 3L NC and sating well, with slightly decreased lung sounds in BLLL  - stepped down to hospital medicine 04/25/18  - on NC at 2 L  - monitor   - incentive spirometry           History of stroke    --Remote lacunar infarcts in the right centrum semi-ovale, basal ganglia, and right tatyana per CTH w/o contrast on 12/2017.  --Continue home clopidogrel.  -- PT/OT          Vascular dementia    - Chronic and stable  - Continue Plavix 75mg PO  daily             Long term (current) use of systemic steroids    --see above          Long QT interval              Hypertension, essential    - pt asymptomatic with essential HTN, on amlodipine 5,  Metoprolol 12.5 bid        Seropositive rheumatoid arthritis of multiple sites    --Appreciate Rheumatology assistance.  --Continue hydroxychloroquine- had to be stopped at unable to crush  -- switched to prednisone 60mg PO QD.            PUD (peptic ulcer disease)    --Continue PPI          Iron deficiency anemia secondary to inadequate dietary iron intake                VTE Risk Mitigation         Ordered     enoxaparin injection 40 mg  Daily      04/25/18 4635              Rashad Ramsey MD  Department of Hospital Medicine   [facility]   Satisfactory

## 2025-01-04 NOTE — ED NOTES
Pt to er c/o productive cough, SOB, dizziness, and weakness since Sunday--symptoms getting progressively worse--speaking in short sentences--denies CP   23

## 2025-01-27 NOTE — PHYSICIAN QUERY
PT Name: Selma Lux MD  MR #: [id number]     CDS/: Migdalia Spears               Contact information: [phone]  This form is a permanent document in the medical record.     Query Date: January 2, 2019    Physician Query - Neurological Condition Clarification    By submitting this query, we are merely seeking further clarification of documentation to reflect the severity of illness of your patient. Please utilize your independent clinical judgment when addressing the question(s) below.    The Medical record reflects the following:     Indicators   Supporting Clinical Findings Location in Medical Record   x AMS, Confusion,  LOC, etc.  Acute encephalopathy     82 y/o F with RA (chronically on steroids, plaquenil and sulfasalazine), history of multiple TIA's (on aspirin and plavix),  And vascular dementia presents to the ED on 12/17/18 with a change in her mentation status with blurry vision (baseline able to perform all ADLS exceptionally) as well as right sided leg pain. The patient denies any numbness or tingling in any of her extremities. Of note, the patient ran out of her prednisone 2 days prior to admission. She has been taking 25 mg daily. On arrival to the ED patient had SBP in the 220 (BL -130). She received 2 doses of Labetalol 20mg IV x 2 with improvement in her blood pressure to the 180s.  Head CT and MRI in the ED were negative for any acute process. EEG 12/19 showed toxic medical encephalopathy without epileptic waves. Repeat MRI does not show any intracranial processes.     Plan  1) IR to obtain LP as we had three failed attempts on the floor. On 12/21 patient did not obtain LP on patient as she lost peripheral access when she went downstairs. Patient mental status is waxing and waning. S/p LP 12/24. 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  stopped Cefepime 12/26/18  2) Encephalopathy initially thought to be hypertensive as patient had blurry vision as well as hypertension. CT and  MRI negative for stroke. BP control, pt unable to take orally. On IV hydralazine prn for  SBP>180. Reviewed images with radiology again and images do not indicate PRES or temporal lobe enhancement.   3)stopped vancomycin 12/25 (CSF unrevealing so far) continue cefepime, and acyclovir. Anticipation to stop Cefepime tomorrow 12/26   4) stopped trazodone 12/25   5) EEG twice were negative for seizure activity.   6) Neurology following up and at this time, we are treating for viral encephalitis               HSV neg,VZV and VDRL pending.                DC acyclovir              EEG not needed              Patient is immunocompromised 2/2 RA medication (steroids, DMARD); likely viral encephalitis picture, which continues to improve        Progress note HM (ANUSHA) 12/30   x Acute / Chronic Illness  history of stroke on Aspirin/Plavix, and vascular dementia who presents to the emergency department with her daughter because of confusion.     Progress note HM (ANUSHA) 12/30    Radiology Findings      Electrolyte Imbalance      Medication      Treatment              Other       Encephalopathy- is a general term for any diffuse disease of the brain that alters brain function or structure. Treatment of the cognitive dysfunction varies but is ultimately dependent on the treatment of the underlying condition.    Major Symptoms of Encephalopathy - Decreased level of consciousness, fluctuating alertness/concentration, confusion, agitation, lethargy, somnolence, drowsiness, obtundation, stupor, or coma.         References: National Institutes of Healths (NIH) National Webster of Neurological Disorders and Strokes;  HCPro 2016; Advisory Board     Clinical Guidelines:   These guidelines will set system standards to assist providers in managing, documentation, and coding of encephalopathy. The intent of this document is to serve as a system guideline, not replace the providers clinical judgment:  Provider, please specify the diagnosis  or diagnoses associated with above clinical findings.  [   ] Hypertensive Encephalopathy - Signs and/or symptoms of cerebral edema caused by severe and/or sudden rises in BP   [ X  ] Metabolic Encephalopathy - Due to electrolye imbalance, metabolic derangements, or infections processes, includes Septic Encephalopathy   [   ] Other Encephalopathy - Includes uremic encephalopathy   [   ] Unspecified Encephalopathy      [   ] Other Neurological Condition-  Includes Post-ictal altered mental status. (please specify condition): _________   [   ]  Clinically Undetermined     Please document in your progress notes daily for the duration of treatment until resolved, and include in your discharge summary.   Chronic hypertension Chronic hypertension Chronic hypertension

## 2025-02-06 NOTE — TELEPHONE ENCOUNTER
Reason for Disposition   Wheezing can be heard across the room    Protocols used: ST BREATHING DIFFICULTY-A-OH  Pt was seen on Monday by pcp, but states she is worse, having cough, worsening sob, wheezing. Advised need to go to ED.  Pt refused ED disposition and request to speak with pcp.  Called clinic and was able to speak with PCP, he stated she needed to be seen in the ED, but he agreed to speak with her directly.   Call transferred to Dr. Lee       Appreciate recommendations

## 2025-05-15 NOTE — PROGRESS NOTES
[facility] - Neurosurgery ([facility])  Vascular Neurology  Comprehensive Stroke Center  Progress Note    Assessment/Plan:     Cerebrovascular accident (CVA) due to stenosis of right posterior cerebral artery  88F with hx of vascular dementia , afib on eliquis, and prior stroke ( 12/2021 R SDH, 6/2022 R paredes, 7/2022 L parietal) was admitted to hospital medicine for encephalopathy and UTI. Vascular neurology consulted 10/13. MRI Brain with small right occipital lacunar infarct. NIHSS 6. TTE EF 65% with prior mass and dilated LA. Etiology likely attributed to cardioembolic source.     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 Educated patient and family about risk factors including hypertension, history of stroke, and afib to name a few.         Mixed hyperlipidemia  Continue atorvastatin 40 mg    UTI (urinary tract infection)  Continue ceftriaxone per primary team    Altered mental status  The patient has a history of stroke, vascular dementia, new lacunar infarct and concurrent UTI which are all contributing to her current encephalopathy.    AF (paroxysmal atrial fibrillation)  Continue eliquis at 5 mg BID         No notes on file    STROKE DOCUMENTATION        NIH Scale:  1a. Level of Consciousness: 0-->Alert, keenly responsive  1b. LOC Questions: 1-->Answers one question correctly  1c. LOC Commands: 0-->Performs both tasks correctly  2. Best Gaze: 0-->Normal  3. Visual: 0-->No visual loss  4. Facial Palsy: 0-->Normal symmetrical movements  5a. Motor Arm, Left: 2-->Some effort against gravity, limb cannot  Nerve Block    Date/Time: 5/15/2025 7:49 AM    Performed by: Jasmina Beltran DO  Authorized by: Jasmina Beltran DO    Block Type: adductor canal  Laterality:  Left  Patient Location:  OR  Indication: post-op pain management at surgeon's request    Surgeon:  Da  Preanesthetic Checklist Patient Identified (2 criteria), Block Plan Confirmed, Resuscitation Equipment Available, Supplemental O2 (if needed), Allergies Confirmed, Block Site Marked (if applicable), Monitors Applied, Aseptic Technique, Coagulation Status, Necessary Block Equipment Present, Timeout Performed, IV Access Functioning, Consent Verified, Drugs/Solutions Labeled and Sedation Given (if needed)    Patient Position:  Supine  Prep:  Chlorhexidine gluconate (CHG)  Max Sterile Barrier Technique:  Hand washing, cap/mask, sterile gloves and sterile gel  Monitoring:  Continuous pulse oximetry, EKG and blood pressure  Injection Technique:  Single-shot  Procedures: ultrasound guided and ultrasound permanent image saved    Needle Type:  Echogenic  Needle Gauge:  22 G  Needle Length:  8 cm  Needle Insertion Depth:  4 cm  Needle Localization:  Ultrasound guidance   in-plane  Physical status during block:  GA without NMB  Medications Administered  ropivacaine (NAROPIN) 0.5 % - Infiltration   20 mL - 5/15/2025 7:49:00 AM  Injection Assessment:  No paresthesia on injection, no resistance to injection, negative aspiration for heme, incremental injection and local visualized surrounding nerve on ultrasound  Patient Condition:  Tolerated well, no immediate complications  Paresthesia Pain:  None  Heart Rate Change: No    Slowly Injected: Yes    Start Time:5/15/2025 7:49 AM  Stop Time: 5/15/2025 7:52 AM       get to or maintain (if cued) 90 (or 45) degrees, drifts down to bed, but has some effort against gravity  5b. Motor Arm, Right: 0-->No drift, limb holds 90 (or 45) degrees for full 10 secs  6a. Motor Leg, Left: 2-->Some effort against gravity, leg falls to bed by 5 secs, but has some effort against gravity  6b. Motor Leg, Right: 0-->No drift, leg holds 30 degree position for full 5 secs  7. Limb Ataxia: 0-->Absent  8. Sensory: 0-->Normal, no sensory loss  9. Best Language: 0-->No aphasia, normal  10. Dysarthria: 1-->Mild-to-moderate dysarthria, patient slurs at least some words and, at worst, can be understood with some difficulty  11. Extinction and Inattention (formerly Neglect): 0-->No abnormality  Total (NIH Stroke Scale): 6       Modified [place] Score: 3  Marion Coma Scale:    ABCD2 Score:    [id number] Score:6  HAS -BLED Score:   ICH Score:   Hunt & Jain Classification:      Hemorrhagic change of an Ischemic Stroke: Does this patient have an ischemic stroke with hemorrhagic changes? No     Neurologic Chief Complaint: R occipital lacunar infarct     Subjective:     Interval History: No interval changes on exam. Continue apixaban 5 mg. Anticipating discharge tomorrow.     HPI, Past Medical, Family, and Social History remains the same as documented in the initial encounter.     Review of Systems   Constitutional:  Negative for chills and fever.   HENT:  Negative for rhinorrhea and trouble swallowing.    Eyes:  Negative for discharge.   Respiratory:  Negative for cough and shortness of breath.    Cardiovascular:  Negative for chest pain and leg swelling.   Gastrointestinal:  Negative for abdominal pain and nausea.   Genitourinary:  Negative for dysuria and hematuria.   Musculoskeletal:  Positive for arthralgias. Negative for myalgias.   Neurological:  Negative for tremors and headaches.   Psychiatric/Behavioral:  Positive for confusion. Negative for agitation.      Scheduled Meds:   albuterol-ipratropium  3 mL  Nebulization BID    apixaban  5 mg Oral BID    atorvastatin  40 mg Oral QHS    baclofen  5 mg Oral TID    cefTRIAXone (ROCEPHIN) IVPB  1 g Intravenous Q24H    fluticasone furoate-vilanteroL  1 puff Inhalation Daily    gabapentin  100 mg Oral TID    hydroxychloroquine  200 mg Oral BID    magnesium oxide  400 mg Oral Daily    magnesium sulfate IVPB  2 g Intravenous Once    [START ON 10/16/2023] predniSONE  10 mg Oral Daily    sulfamethoxazole-trimethoprim 800-160mg  1 tablet Oral Every Mon, Wed, Fri    thiamine  100 mg Oral Daily     Continuous Infusions:  PRN Meds:albuterol sulfate **AND** ipratropium, glucagon (human recombinant), glucose, glucose, melatonin, naloxone, QUEtiapine, simethicone, sodium chloride 0.9%, sodium chloride 0.9%    Objective:     Vital Signs (Most Recent):  Temp: 98.6 °F (37 °C) (10/15/23 1237)  Pulse: 94 (10/15/23 1237)  Resp: 18 (10/15/23 1237)  BP: (!) 111/53 (10/15/23 1237)  SpO2: 96 % (10/15/23 1237)  BP Location: Left arm    Vital Signs Range (Last 24H):  Temp:  [97.3 °F (36.3 °C)-98.6 °F (37 °C)]   Pulse:  [66-95]   Resp:  [12-19]   BP: (111-154)/(53-71)   SpO2:  [96 %-100 %]   BP Location: Left arm       Physical Exam  Eyes:      Comments: Right eye ptosis from prior stroke.              Neurological Exam:   LOC: alert  Attention Span: Good   Language: No aphasia  Articulation: Dysarthria  Orientation: Person, Place, Time   EOM (CN III, IV, VI): Ptosis right  Facial Sensation (CN V): Normal  Facial Movement (CN VII): Symmetric facial expression    Motor: Arm left  Paresis: 3/5  Leg left  Paresis: 2/5  Arm right  Normal 5/5  Leg right Normal 5/5  Sensation: Intact to light touch, temperature and vibration    Laboratory:  CMP:   Recent Labs   Lab 10/15/23  1009   CALCIUM 8.7   *   K 3.5   CO2 15*      BUN 9   CREATININE 0.8     CBC:   Recent Labs   Lab 10/15/23  1009   WBC 11.48   RBC 4.48   HGB 11.9*   HCT 39.1      MCV 87   MCH 26.6*   MCHC 30.4*     Lipid Panel: No  "results for input(s): "CHOL", "LDLCALC", "HDL", "TRIG" in the last 168 hours.  Hgb A1C: No results for input(s): "HGBA1C" in the last 168 hours.  TSH:   Recent Labs   Lab 10/11/23  1354   TSH 1.264       Diagnostic Results     Brain Imaging   MRI small right occipital lacunar infarct  CT H no hemorrhage     Cardiac Imaging   EF 65% with prior mass and left atrial dilation       Baldomero Becerra MD  Com[facility] Stroke Center  Department of Vascular Neurology   [facility]      "

## 2025-06-09 NOTE — ED NOTES
Admit info / head to toe assessment done.    Please abstract the following data from this visit with this patient into the appropriate field in Epic:  Tests that can be patient reported without a hard copy:  [facility]
